# Patient Record
Sex: MALE | Race: ASIAN | Employment: OTHER | URBAN - METROPOLITAN AREA
[De-identification: names, ages, dates, MRNs, and addresses within clinical notes are randomized per-mention and may not be internally consistent; named-entity substitution may affect disease eponyms.]

---

## 2017-01-04 ENCOUNTER — APPOINTMENT (OUTPATIENT)
Dept: CARDIAC REHAB | Age: 67
End: 2017-01-04

## 2017-01-05 ENCOUNTER — OFFICE VISIT (OUTPATIENT)
Dept: FAMILY MEDICINE CLINIC | Age: 67
End: 2017-01-05

## 2017-01-05 VITALS
DIASTOLIC BLOOD PRESSURE: 84 MMHG | BODY MASS INDEX: 23.3 KG/M2 | HEIGHT: 66 IN | WEIGHT: 145 LBS | HEART RATE: 81 BPM | SYSTOLIC BLOOD PRESSURE: 142 MMHG | TEMPERATURE: 97.7 F | RESPIRATION RATE: 14 BRPM

## 2017-01-05 DIAGNOSIS — Z01.818 PREOP GENERAL PHYSICAL EXAM: Primary | ICD-10-CM

## 2017-01-05 DIAGNOSIS — E78.5 DYSLIPIDEMIA, GOAL LDL BELOW 100: ICD-10-CM

## 2017-01-05 DIAGNOSIS — I21.4 NSTEMI (NON-ST ELEVATED MYOCARDIAL INFARCTION) (HCC): ICD-10-CM

## 2017-01-05 DIAGNOSIS — E11.65 TYPE 2 DIABETES MELLITUS WITH HYPERGLYCEMIA, WITHOUT LONG-TERM CURRENT USE OF INSULIN (HCC): ICD-10-CM

## 2017-01-05 DIAGNOSIS — I10 ESSENTIAL HYPERTENSION WITH GOAL BLOOD PRESSURE LESS THAN 130/85: ICD-10-CM

## 2017-01-05 RX ORDER — METOPROLOL SUCCINATE 50 MG/1
50 TABLET, EXTENDED RELEASE ORAL DAILY
Qty: 30 TAB | Refills: 5 | Status: SHIPPED | OUTPATIENT
Start: 2017-01-05 | End: 2017-12-14

## 2017-01-05 RX ORDER — PRAVASTATIN SODIUM 40 MG/1
40 TABLET ORAL
Qty: 30 TAB | Refills: 4 | Status: SHIPPED | OUTPATIENT
Start: 2017-01-05 | End: 2018-06-25

## 2017-01-05 RX ORDER — HYDROCODONE POLISTIREX AND CHLORPHENIRAMINE POLISTIREX 10; 8 MG/5ML; MG/5ML
1 SUSPENSION, EXTENDED RELEASE ORAL
Qty: 120 ML | Refills: 0 | Status: SHIPPED | OUTPATIENT
Start: 2017-01-05 | End: 2017-05-30 | Stop reason: ALTCHOICE

## 2017-01-05 RX ORDER — LOSARTAN POTASSIUM AND HYDROCHLOROTHIAZIDE 12.5; 5 MG/1; MG/1
1 TABLET ORAL DAILY
Qty: 30 TAB | Refills: 5 | Status: SHIPPED | OUTPATIENT
Start: 2017-01-05 | End: 2018-03-21 | Stop reason: ALTCHOICE

## 2017-01-05 NOTE — PROGRESS NOTES
Chief Complaint   Patient presents with    Pre-op Exam     cataract surgery      1. Have you been to the ER, urgent care clinic since your last visit? Hospitalized since your last visit? Yes    2. Have you seen or consulted any other health care providers outside of the [address] [place] [address] since your last visit? Include any pap smears or colon screening.    No

## 2017-01-05 NOTE — PROGRESS NOTES
HISTORY OF PRESENT ILLNESS  Dorothy Dey is a 73 y.o. male. He was seen for preop clearance for eye surgery, follow up on CAD, HTN , dyslipidemia and DM. He follows Dr Jazmyne Villatoro . He had 2 vessel PCI on 11/11/2016 and is on dual antiplatelet. HPI  HPI:  Charlene Etienne is 73 y.o. male (1950) who presents for preoperative evaluation. Procedure/Surgery: removal of cataract and implantation of intraocular lens   Date of Procedure/Surgery: 1/17/17  Surgeon: Dr Arabella Rao  Primary Physician: Javier Baker MD       Reason for surgery: worsening vision    Latex Allergy: no    Anesthesia Complications: None  History of abnormal bleeding : None  History of Blood Transfusions: no  Health Care Directive or Living Will: no  Recent use of: ASA and Effient  Tetanus up to date: tetanus status unknown to the patient      EKG: EKG FINDINGS - normal EKG, normal sinus rhythm  CXR: was negative for infiltrate, effusion, pneumothorax, or wide mediastinum          ---------------------------------------     Prior to Admission medications    Medication Sig Start Date End Date Taking? Authorizing Provider   losartan-hydroCHLOROthiazide (HYZAAR) 50-12.5 mg per tablet Take 1 Tab by mouth daily. 1/5/17  Yes Javier Baker MD   metoprolol succinate (TOPROL-XL) 50 mg XL tablet Take 1 Tab by mouth daily. 1/5/17  Yes Javier Baker MD   pravastatin (PRAVACHOL) 40 mg tablet Take 1 Tab by mouth nightly. 1/5/17  Yes Javier Baker MD   chlorpheniramine-HYDROcodone (TUSSIONEX) 10-8 mg/5 mL suspension Take 5 mL by mouth every twelve (12) hours as needed for Cough. Max Daily Amount: 10 mL. 1/5/17  Yes Javier Baker MD   glucose blood VI test strips (ONETOUCH VERIO) strip Check sugar once daily 12/6/16  Yes Javier Baker MD   lancets (ONE TOUCH DELICA) 33 gauge misc Check sugar once daily 12/6/16  Yes Javier Baker MD   prasugrel (EFFIENT) 10 mg tablet Take 1 Tab by mouth daily.  11/12/16  Yes Amanda Keen MD   glipiZIDE (GLUCOTROL) 10 mg tablet Take 1 Tab by mouth two (2) times a day. 9/22/16  Yes Francisco Upton MD   metFORMIN (GLUCOPHAGE) 1,000 mg tablet Take 1 Tab by mouth two (2) times daily (with meals). 9/22/16  Yes Francisco Upton MD   aspirin 81 mg tablet Take 81 mg by mouth daily. Yes Historical Provider   therapeutic multivitamin (THERAGRAN) tablet Take 1 Tab by mouth daily. Historical Provider          No Known Allergies     Patient Active Problem List    Diagnosis Date Noted    Preop general physical exam 01/05/2017    NSTEMI (non-ST elevated myocardial infarction) ([facility] [address] 11/10/2016    Nonrheumatic aortic valve stenosis 09/21/2016    Bruit of right carotid artery 09/21/2016    Essential hypertension with goal blood pressure less than 130/85 05/26/2015    Type 2 diabetes mellitus with hyperglycemia ([facility] [address] 05/26/2015    Deafness 10/28/2012    Dyslipidemia, goal LDL below 100 03/26/2012    Cramp of limb 03/26/2012        Past Medical History   Diagnosis Date    CAD (coronary artery disease) 11/10/2016     NSTEMI & 2 stents    Deafness 10/28/2012    DM (diabetes mellitus) ([facility] [address]     Elevated cholesterol     Hypertension        Past Surgical History   Procedure Laterality Date    Hx appendectomy      Pr cardiac surg procedure unlist  11/11/2016     2 stents       Social History   Substance Use Topics    Smoking status: Never Smoker    Smokeless tobacco: Never Used    Alcohol use 1.2 oz/week     1 Cans of beer, 1 Shots of liquor per week         --------------------------------------    Review of Systems   Constitutional: Negative for chills, fever and malaise/fatigue. HENT: Negative for congestion, ear pain, sore throat and tinnitus. Eyes: Negative for blurred vision, double vision, pain and discharge. Respiratory: Positive for cough. Negative for shortness of breath and wheezing. Cardiovascular: Negative for chest pain, palpitations and leg swelling.    Gastrointestinal: Negative for abdominal pain, blood in stool, constipation, diarrhea, nausea and vomiting. Genitourinary: Negative for dysuria, frequency, hematuria and urgency. Musculoskeletal: Negative for back pain, joint pain and myalgias. Skin: Negative for rash. Neurological: Negative for dizziness, tremors, seizures and headaches. Endo/Heme/Allergies: Negative for polydipsia. Does not bruise/bleed easily. Psychiatric/Behavioral: Negative for depression and substance abuse. The patient is not nervous/anxious. Physical Exam   Constitutional: He is oriented to person, place, and time. He appears well-developed and well-nourished. HENT:   Head: Normocephalic and atraumatic. Right Ear: External ear normal.   Mouth/Throat: Oropharynx is clear and moist. No oropharyngeal exudate. Eyes: Conjunctivae and EOM are normal. Pupils are equal, round, and reactive to light. No scleral icterus. Neck: Normal range of motion. Neck supple. No JVD present. No thyromegaly present. Cardiovascular: Normal rate, regular rhythm, normal heart sounds and intact distal pulses. No murmur heard. Pulmonary/Chest: Effort normal and breath sounds normal. He has no wheezes. Abdominal: Soft. Bowel sounds are normal. He exhibits no distension and no mass. Musculoskeletal: Normal range of motion. He exhibits no edema or tenderness. Lymphadenopathy:     He has no cervical adenopathy. Neurological: He is alert and oriented to person, place, and time. He has normal reflexes. No cranial nerve deficit. Skin: Skin is warm and dry. No rash noted. He is not diaphoretic. Psychiatric: He has a normal mood and affect. Nursing note and vitals reviewed. ASSESSMENT and PLAN  Elena Casas was seen today for pre-op exam.    Diagnoses and all orders for this visit:    Preop general physical exam    Dyslipidemia, goal LDL below 075  -     METABOLIC PANEL, COMPREHENSIVE  -     LIPID PANEL  -     pravastatin (PRAVACHOL) 40 mg tablet;  Take 1 Tab by mouth nightly. Type 2 diabetes mellitus with hyperglycemia, without long-term current use of insulin ([facility])  -     METABOLIC PANEL, COMPREHENSIVE  -     HEMOGLOBIN A1C WITH EAG    NSTEMI (non-ST elevated myocardial infarction) ([facility])  -     METABOLIC PANEL, COMPREHENSIVE  -     LIPID PANEL    Essential hypertension with goal blood pressure less than 130/85  -     losartan-hydroCHLOROthiazide (HYZAAR) 50-12.5 mg per tablet; Take 1 Tab by mouth daily. -     metoprolol succinate (TOPROL-XL) 50 mg XL tablet; Take 1 Tab by mouth daily. Other orders  -     chlorpheniramine-HYDROcodone (TUSSIONEX) 10-8 mg/5 mL suspension; Take 5 mL by mouth every twelve (12) hours as needed for Cough. Max Daily Amount: 10 mL. Metoprolol changed to Metoprolol XL 50 mg as per cardiology advise. Losartan changed to Losartan/hctz due to suboptimal control of BP  Pravastatin dose was increased to 40 mg  After reviewing the patient's history & exam he is high risk for cardiac complications. Will get blood work and cardiologist opinion, before clearing him for surgery    Discussed lifestyle issues and health guidance given  Patient was given an after visit summary which includes diagnoses, vital signs, current medications, instructions and references & authorized prescriptions . Results of labs will be conveyed to patient, once available. Pt verbalized instructions I provided and expressed understanding of discussion that was held today. Follow-up Disposition:  Return in about 3 months (around 4/5/2017) for fasting, medicare wellness.

## 2017-01-05 NOTE — PATIENT INSTRUCTIONS
DASH Diet: Care Instructions  Your Care Instructions  The DASH diet is an eating plan that can help lower your blood pressure. DASH stands for Dietary Approaches to Stop Hypertension. Hypertension is high blood pressure. The DASH diet focuses on eating foods that are high in calcium, potassium, and magnesium. These nutrients can lower blood pressure. The foods that are highest in these nutrients are fruits, vegetables, low-fat dairy products, nuts, seeds, and legumes. But taking calcium, potassium, and magnesium supplements instead of eating foods that are high in those nutrients does not have the same effect. The DASH diet also includes whole grains, fish, and poultry. The DASH diet is one of several lifestyle changes your doctor may recommend to lower your high blood pressure. Your doctor may also want you to decrease the amount of sodium in your diet. Lowering sodium while following the DASH diet can lower blood pressure even further than just the DASH diet alone. Follow-up care is a key part of your treatment and safety. Be sure to make and go to all appointments, and call your doctor if you are having problems. It's also a good idea to know your test results and keep a list of the medicines you take. How can you care for yourself at home? Following the DASH diet  · Eat 4 to 5 servings of fruit each day. A serving is 1 medium-sized piece of fruit, ½ cup chopped or canned fruit, 1/4 cup dried fruit, or 4 ounces (½ cup) of fruit juice. Choose fruit more often than fruit juice. · Eat 4 to 5 servings of vegetables each day. A serving is 1 cup of lettuce or raw leafy vegetables, ½ cup of chopped or cooked vegetables, or 4 ounces (½ cup) of vegetable juice. Choose vegetables more often than vegetable juice. · Get 2 to 3 servings of low-fat and fat-free dairy each day. A serving is 8 ounces of milk, 1 cup of yogurt, or 1 ½ ounces of cheese. · Eat 6 to 8 servings of grains each day.  A serving is 1 slice of bread, 1 ounce of dry cereal, or ½ cup of cooked rice, pasta, or cooked cereal. Try to choose whole-grain products as much as possible. · Limit lean meat, poultry, and fish to 2 servings each day. A serving is 3 ounces, about the size of a deck of cards. · Eat 4 to 5 servings of nuts, seeds, and legumes (cooked dried beans, lentils, and split peas) each week. A serving is 1/3 cup of nuts, 2 tablespoons of seeds, or ½ cup of cooked beans or peas. · Limit fats and oils to 2 to 3 servings each day. A serving is 1 teaspoon of vegetable oil or 2 tablespoons of salad dressing. · Limit sweets and added sugars to 5 servings or less a week. A serving is 1 tablespoon jelly or jam, ½ cup sorbet, or 1 cup of lemonade. · Eat less than 2,300 milligrams (mg) of sodium a day. If you limit your sodium to 1,500 mg a day, you can lower your blood pressure even more. Tips for success  · Start small. Do not try to make dramatic changes to your diet all at once. You might feel that you are missing out on your favorite foods and then be more likely to not follow the plan. Make small changes, and stick with them. Once those changes become habit, add a few more changes. · Try some of the following:  ¨ Make it a goal to eat a fruit or vegetable at every meal and at snacks. This will make it easy to get the recommended amount of fruits and vegetables each day. ¨ Try yogurt topped with fruit and nuts for a snack or healthy dessert. ¨ Add lettuce, tomato, cucumber, and onion to sandwiches. ¨ Combine a ready-made pizza crust with low-fat mozzarella cheese and lots of vegetable toppings. Try using tomatoes, squash, spinach, broccoli, carrots, cauliflower, and onions. ¨ Have a variety of cut-up vegetables with a low-fat dip as an appetizer instead of chips and dip. ¨ Sprinkle sunflower seeds or chopped almonds over salads. Or try adding chopped walnuts or almonds to cooked vegetables. ¨ Try some vegetarian meals using beans and peas. Add garbanzo or kidney beans to salads. Make burritos and tacos with mashed celestin beans or black beans. Where can you learn more? Go to http://pardeep-harjit.info/. Enter L349 in the search box to learn more about \"DASH Diet: Care Instructions. \"  Current as of: March 23, 2016  Content Version: 11.1  © 1958-6109 [employer]. Care instructions adapted under license by [employer] (which disclaims liability or warranty for this information). If you have questions about a medical condition or this instruction, always ask your healthcare professional. [address] any warranty or liability for your use of this information.

## 2017-01-05 NOTE — MR AVS SNAPSHOT
Visit Information Date & Time Provider Department Dept. Phone Encounter #  
 1/5/2017  3:00 PM Berenice Becker MD Alec Egan [id number] Follow-up Instructions Return in about 3 months (around 4/5/2017) for fasting, medicare wellness. Upcoming Health Maintenance Date Due Hepatitis C Screening 1950 DTaP/Tdap/Td series (1 - Tdap) 10/5/1972 FOBT Q 1 YEAR AGE 50-75 10/5/2001 ZOSTER VACCINE AGE 60> 10/5/2011 Pneumococcal 65+ Low/Medium Risk (1 of 2 - PCV13) 10/5/2016 MEDICARE YEARLY EXAM 10/5/2016 HEMOGLOBIN A1C Q6M 3/21/2017 MICROALBUMIN Q1 4/19/2017 FOOT EXAM Q1 9/21/2017 LIPID PANEL Q1 9/21/2017 EYE EXAM RETINAL OR DILATED Q1 12/6/2017 GLAUCOMA SCREENING Q2Y 12/6/2018 Allergies as of 1/5/2017  Review Complete On: 1/5/2017 By: Berenice Becker MD  
 No Known Allergies Current Immunizations  Reviewed on 11/10/2016 Name Date Influenza Vaccine 10/20/2015 10:00 AM, 1/27/2015  9:30 AM, 10/22/2013 Influenza Vaccine (Quad) PF 11/12/2016 10:10 AM  
  
 Not reviewed this visit You Were Diagnosed With   
  
 Codes Comments Preop general physical exam    -  Primary ICD-10-CM: O09.641 ICD-9-CM: V72.83 Dyslipidemia, goal LDL below 100     ICD-10-CM: E78.5 ICD-9-CM: 272.4 Type 2 diabetes mellitus with hyperglycemia, without long-term current use of insulin (HCC)     ICD-10-CM: E11.65 ICD-9-CM: 250.00, 790.29 NSTEMI (non-ST elevated myocardial infarction) ([facility]     ICD-10-CM: I21.4 ICD-9-CM: 410.70 Essential hypertension with goal blood pressure less than 130/85     ICD-10-CM: I10 
ICD-9-CM: 401.9 Vitals BP Pulse Temp Resp Height(growth percentile) Weight(growth percentile) 142/84 (BP 1 Location: Right arm, BP Patient Position: Sitting) 81 97.7 °F (36.5 °C) (Oral) 14 5' 6\" (1.676 m) 145 lb (65.8 kg) BMI Smoking Status 23.4 kg/m2 Never Smoker Vitals History BMI and BSA Data Body Mass Index Body Surface Area  
 23.4 kg/m 2 1.75 m 2 Preferred Pharmacy Pharmacy Name Phone [facility] [phone] Your Updated Medication List  
  
   
This list is accurate as of: 1/5/17  4:04 PM.  Always use your most recent med list.  
  
  
  
  
 aspirin 81 mg tablet Take 81 mg by mouth daily. glipiZIDE 10 mg tablet Commonly known as:  Ashley Shoe Take 1 Tab by mouth two (2) times a day. glucose blood VI test strips strip Commonly known as:  Amie Morales Check sugar once daily  
  
 lancets 33 gauge Misc Commonly known as: One Touch Shipman Dings Check sugar once daily  
  
 losartan-hydroCHLOROthiazide 50-12.5 mg per tablet Commonly known as:  HYZAAR Take 1 Tab by mouth daily. metFORMIN 1,000 mg tablet Commonly known as:  GLUCOPHAGE Take 1 Tab by mouth two (2) times daily (with meals). metoprolol succinate 50 mg XL tablet Commonly known as:  TOPROL-XL Take 1 Tab by mouth daily. prasugrel 10 mg tablet Commonly known as:  EFFIENT Take 1 Tab by mouth daily. pravastatin 40 mg tablet Commonly known as:  PRAVACHOL Take 1 Tab by mouth nightly. therapeutic multivitamin tablet Commonly known as:  [facility] Take 1 Tab by mouth daily. Prescriptions Sent to Pharmacy Refills  
 losartan-hydroCHLOROthiazide (HYZAAR) 50-12.5 mg per tablet 5 Sig: Take 1 Tab by mouth daily. Class: Normal  
 Pharmacy: [address] Ph #: [phone] Route: Oral  
 metoprolol succinate (TOPROL-XL) 50 mg XL tablet 5 Sig: Take 1 Tab by mouth daily. Class: Normal  
 Pharmacy: [address] Ph #: [phone] Route: Oral  
 pravastatin (PRAVACHOL) 40 mg tablet 4 Sig: Take 1 Tab by mouth nightly. Class: Normal  
 [facility] #: [phone] Route: Oral  
  
We Performed the Following HEMOGLOBIN A1C WITH EAG [97923 CPT(R)] LIPID PANEL [53614 CPT(R)] METABOLIC PANEL, COMPREHENSIVE [08703 CPT(R)] Follow-up Instructions Return in about 3 months (around 4/5/2017) for fasting, medicare wellness.   
  
To-Do List   
 01/09/2017 4:00 PM  
  Appointment with [address] at [address] [place] [address] ([phone])  
  
 01/11/2017 3:00 PM  
  Appointment with [address] at [address] [place] [address] ([phone])  
  
 01/12/2017 4:00 PM  
  Appointment with [address] at [address] [place] [address] ([phone])  
  
 01/16/2017 4:00 PM  
  Appointment with [address] at [address] [place] [address] ([phone])  
  
 01/18/2017 3:00 PM  
  Appointment with [address] at [address] [place] [address] ([phone])  
  
 01/19/2017 4:00 PM  
  Appointment with [address] at [address] [place] [address] ([phone])  
  
 01/23/2017 4:00 PM  
  Appointment with [address] at [address] [place] [address] ([phone])  
  
 01/25/2017 3:00 PM  
  Appointment with [address] at [address] [place] [address] ([phone])  
  
 01/26/2017 4:00 PM  
  Appointment with [address] at [address] [place] [address] ([phone])  
  
 01/30/2017 4:00 PM  
  Appointment with [address] at [address] [place] [address] ([phone])  
  
 02/01/2017 3:00 PM  
  Appointment with [address] at [address] [place] [address] ([phone])  
  
 02/02/2017 4:00 PM  
  Appointment with [address] at [address] [place] [address] ([phone])  
  
 02/06/2017 4:00 PM  
  Appointment with [address] at [address] [place] [address] ([phone])  
  
 02/08/2017 3:00 PM  
 Appointment with [address] at [address] [place] [address] ([phone])  
  
 02/09/2017 4:00 PM  
  Appointment with [address] at [address] [place] [address] ([phone])  
  
 02/13/2017 4:00 PM  
  Appointment with [address] at [address] [place] [address] ([phone])  
  
 02/15/2017 3:00 PM  
  Appointment with [address] at [address] [place] [address] ([phone])  
  
 02/16/2017 4:00 PM  
  Appointment with [address] at [address] [place] [address] ([phone])  
  
 02/20/2017 4:00 PM  
  Appointment with [address] at [address] [place] [address] ([phone])  
  
 02/22/2017 3:00 PM  
  Appointment with [address] at [address] [place] [address] ([phone])  
  
 02/23/2017 4:00 PM  
  Appointment with [address] at [address] [place] [address] ([phone])  
  
 02/27/2017 4:00 PM  
  Appointment with [address] at [address] [place] [address] ([phone]) Patient Instructions DASH Diet: Care Instructions Your Care Instructions The DASH diet is an eating plan that can help lower your blood pressure. DASH stands for Dietary Approaches to Stop Hypertension. Hypertension is high blood pressure. The DASH diet focuses on eating foods that are high in calcium, potassium, and magnesium. These nutrients can lower blood pressure. The foods that are highest in these nutrients are fruits, vegetables, low-fat dairy products, nuts, seeds, and legumes. But taking calcium, potassium, and magnesium supplements instead of eating foods that are high in those nutrients does not have the same effect. The DASH diet also includes whole grains, fish, and poultry. The DASH diet is one of several lifestyle changes your doctor may recommend to lower your high blood pressure. Your doctor may also want you to decrease the amount of sodium in your diet.  Lowering sodium while following the DASH diet can lower blood pressure even further than just the DASH diet alone. Follow-up care is a key part of your treatment and safety. Be sure to make and go to all appointments, and call your doctor if you are having problems. It's also a good idea to know your test results and keep a list of the medicines you take. How can you care for yourself at home? Following the DASH diet · Eat 4 to 5 servings of fruit each day. A serving is 1 medium-sized piece of fruit, ½ cup chopped or canned fruit, 1/4 cup dried fruit, or 4 ounces (½ cup) of fruit juice. Choose fruit more often than fruit juice. · Eat 4 to 5 servings of vegetables each day. A serving is 1 cup of lettuce or raw leafy vegetables, ½ cup of chopped or cooked vegetables, or 4 ounces (½ cup) of vegetable juice. Choose vegetables more often than vegetable juice. · Get 2 to 3 servings of low-fat and fat-free dairy each day. A serving is 8 ounces of milk, 1 cup of yogurt, or 1 ½ ounces of cheese. · Eat 6 to 8 servings of grains each day. A serving is 1 slice of bread, 1 ounce of dry cereal, or ½ cup of cooked rice, pasta, or cooked cereal. Try to choose whole-grain products as much as possible. · Limit lean meat, poultry, and fish to 2 servings each day. A serving is 3 ounces, about the size of a deck of cards. · Eat 4 to 5 servings of nuts, seeds, and legumes (cooked dried beans, lentils, and split peas) each week. A serving is 1/3 cup of nuts, 2 tablespoons of seeds, or ½ cup of cooked beans or peas. · Limit fats and oils to 2 to 3 servings each day. A serving is 1 teaspoon of vegetable oil or 2 tablespoons of salad dressing. · Limit sweets and added sugars to 5 servings or less a week. A serving is 1 tablespoon jelly or jam, ½ cup sorbet, or 1 cup of lemonade. · Eat less than 2,300 milligrams (mg) of sodium a day. If you limit your sodium to 1,500 mg a day, you can lower your blood pressure even more. Tips for success · Start small. Do not try to make dramatic changes to your diet all at once. You might feel that you are missing out on your favorite foods and then be more likely to not follow the plan. Make small changes, and stick with them. Once those changes become habit, add a few more changes. · Try some of the following: ¨ Make it a goal to eat a fruit or vegetable at every meal and at snacks. This will make it easy to get the recommended amount of fruits and vegetables each day. ¨ Try yogurt topped with fruit and nuts for a snack or healthy dessert. ¨ Add lettuce, tomato, cucumber, and onion to sandwiches. ¨ Combine a ready-made pizza crust with low-fat mozzarella cheese and lots of vegetable toppings. Try using tomatoes, squash, spinach, broccoli, carrots, cauliflower, and onions. ¨ Have a variety of cut-up vegetables with a low-fat dip as an appetizer instead of chips and dip. ¨ Sprinkle sunflower seeds or chopped almonds over salads. Or try adding chopped walnuts or almonds to cooked vegetables. ¨ Try some vegetarian meals using beans and peas. Add garbanzo or kidney beans to salads. Make burritos and tacos with mashed celestin beans or black beans. Where can you learn more? Go to http://pardeep-harjit.info/. Enter D980 in the search box to learn more about \"DASH Diet: Care Instructions. \" Current as of: March 23, 2016 Content Version: 11.1 © 3007-2139 [employer]. Care instructions adapted under license by [employer] (which disclaims liability or warranty for this information). If you have questions about a medical condition or this instruction, always ask your healthcare professional. [place] [address] any warranty or liability for your use of this information. Introducing [facility]! Dear Enoc Stanley: Thank you for requesting a eTipping account. Our records indicate that you already have an active eTipping account.   You can access your account anytime at [email or web address] On The Go. [employer]/Live On The Go Did you know that you can access your hospital and ER discharge instructions at any time in [employer]? You can also review all of your test results from your hospital stay or ER visit. Additional Information If you have questions, please visit the Frequently Asked Questions section of the [employer] website at [email or web address] Remember, [employer] is NOT to be used for urgent needs. For medical emergencies, dial 911. Now available from your iPhone and Android! Please provide this summary of care documentation to your next provider. Your primary care clinician is listed as Aung Murdock. If you have any questions after today's visit, please call [phone].

## 2017-01-07 LAB
ALBUMIN SERPL-MCNC: 3.9 G/DL (ref 3.6–4.8)
ALBUMIN/GLOB SERPL: 1.3 {RATIO} (ref 1.1–2.5)
ALP SERPL-CCNC: 87 IU/L (ref 39–117)
ALT SERPL-CCNC: 23 IU/L (ref 0–44)
AST SERPL-CCNC: 28 IU/L (ref 0–40)
BILIRUB SERPL-MCNC: 0.2 MG/DL (ref 0–1.2)
BUN SERPL-MCNC: 12 MG/DL (ref 8–27)
BUN/CREAT SERPL: 14 (ref 10–22)
CALCIUM SERPL-MCNC: 9.1 MG/DL (ref 8.6–10.2)
CHLORIDE SERPL-SCNC: 105 MMOL/L (ref 96–106)
CHOLEST SERPL-MCNC: 177 MG/DL (ref 100–199)
CO2 SERPL-SCNC: 23 MMOL/L (ref 18–29)
CREAT SERPL-MCNC: 0.84 MG/DL (ref 0.76–1.27)
EST. AVERAGE GLUCOSE BLD GHB EST-MCNC: 177 MG/DL
GLOBULIN SER CALC-MCNC: 3.1 G/DL (ref 1.5–4.5)
GLUCOSE SERPL-MCNC: 130 MG/DL (ref 65–99)
HBA1C MFR BLD: 7.8 % (ref 4.8–5.6)
HDLC SERPL-MCNC: 47 MG/DL
INTERPRETATION, 910389: NORMAL
LDLC SERPL CALC-MCNC: 112 MG/DL (ref 0–99)
POTASSIUM SERPL-SCNC: 5 MMOL/L (ref 3.5–5.2)
PROT SERPL-MCNC: 7 G/DL (ref 6–8.5)
SODIUM SERPL-SCNC: 142 MMOL/L (ref 134–144)
TRIGL SERPL-MCNC: 89 MG/DL (ref 0–149)
VLDLC SERPL CALC-MCNC: 18 MG/DL (ref 5–40)

## 2017-01-08 DIAGNOSIS — E11.65 TYPE 2 DIABETES MELLITUS WITH HYPERGLYCEMIA, WITHOUT LONG-TERM CURRENT USE OF INSULIN (HCC): Primary | ICD-10-CM

## 2017-01-09 DIAGNOSIS — E11.65 TYPE 2 DIABETES MELLITUS WITH HYPERGLYCEMIA, WITHOUT LONG-TERM CURRENT USE OF INSULIN (HCC): ICD-10-CM

## 2017-01-09 RX ORDER — METFORMIN HYDROCHLORIDE 1000 MG/1
1000 TABLET ORAL 2 TIMES DAILY WITH MEALS
Qty: 180 TAB | Refills: 2 | Status: SHIPPED | OUTPATIENT
Start: 2017-01-09 | End: 2017-04-21 | Stop reason: SDUPTHER

## 2017-01-09 RX ORDER — GLIPIZIDE 10 MG/1
10 TABLET ORAL 2 TIMES DAILY
Qty: 180 TAB | Refills: 2 | Status: SHIPPED | OUTPATIENT
Start: 2017-01-09 | End: 2017-04-21 | Stop reason: SDUPTHER

## 2017-01-11 ENCOUNTER — APPOINTMENT (OUTPATIENT)
Dept: CARDIAC REHAB | Age: 67
End: 2017-01-11

## 2017-01-16 ENCOUNTER — TELEPHONE (OUTPATIENT)
Dept: FAMILY MEDICINE CLINIC | Age: 67
End: 2017-01-16

## 2017-01-17 NOTE — TELEPHONE ENCOUNTER
Mrs. Kavya Becker was informed that the cardiologist should be notified of medication refill, being that they are managing PT/INR. Mrs. Kendall verbalized understanding.

## 2017-01-18 ENCOUNTER — APPOINTMENT (OUTPATIENT)
Dept: CARDIAC REHAB | Age: 67
End: 2017-01-18

## 2017-01-25 ENCOUNTER — APPOINTMENT (OUTPATIENT)
Dept: CARDIAC REHAB | Age: 67
End: 2017-01-25

## 2017-01-27 ENCOUNTER — TELEPHONE (OUTPATIENT)
Dept: CARDIAC REHAB | Age: 67
End: 2017-01-27

## 2017-01-27 NOTE — TELEPHONE ENCOUNTER
Cardiac Wellness: Call placed to [address] [place] [address] to check on his absence for the CWP. Spoke with his wife and he is having cataract surgery on 1/31/2017 and we agreed to a recall on 2/3 to check on his return then. Darshana Crowley RN

## 2017-02-01 ENCOUNTER — APPOINTMENT (OUTPATIENT)
Dept: CARDIAC REHAB | Age: 67
End: 2017-02-01
Payer: COMMERCIAL

## 2017-02-01 ENCOUNTER — HOSPITAL ENCOUNTER (OUTPATIENT)
Dept: CARDIAC REHAB | Age: 67
End: 2017-02-01
Payer: COMMERCIAL

## 2017-02-01 PROCEDURE — 93798 PHYS/QHP OP CAR RHAB W/ECG: CPT

## 2017-02-02 ENCOUNTER — TELEPHONE (OUTPATIENT)
Dept: CARDIAC REHAB | Age: 67
End: 2017-02-02

## 2017-02-02 NOTE — TELEPHONE ENCOUNTER
Cardiac Wellness:Call placed to [facility] and spoke with his wife. His cataract surgery was cancelled again due to elevated blood pressure. His meds are currently being adjusted. Agreed to call mid Feb to check his status. Malka Lanza RN         []Hide copied text  []Hover for attribution information  Cardiac Wellness: Call placed to [facility] to check on his absence for the CWP. Spoke with his wife and he is having cataract surgery on 1/31/2017 and we agreed to a recall on 2/3 to check on his return then. Malka Lanza RN            Electronically signed by Malka Lanza RN at 01/27/17 1526        Telephone on 1/27/2017              Detailed Report         Note Details   Author Malka Lanza RN File Time 01/27/17 1520   Author Type Registered Nurse Status Signed   Last  Malka Lanza RN Service (none)

## 2017-02-08 ENCOUNTER — APPOINTMENT (OUTPATIENT)
Dept: CARDIAC REHAB | Age: 67
End: 2017-02-08
Payer: COMMERCIAL

## 2017-02-15 ENCOUNTER — APPOINTMENT (OUTPATIENT)
Dept: CARDIAC REHAB | Age: 67
End: 2017-02-15
Payer: COMMERCIAL

## 2017-02-20 ENCOUNTER — APPOINTMENT (OUTPATIENT)
Dept: CARDIAC REHAB | Age: 67
End: 2017-02-20
Payer: COMMERCIAL

## 2017-02-20 ENCOUNTER — TELEPHONE (OUTPATIENT)
Dept: CARDIAC REHAB | Age: 67
End: 2017-02-20

## 2017-02-22 ENCOUNTER — APPOINTMENT (OUTPATIENT)
Dept: CARDIAC REHAB | Age: 67
End: 2017-02-22
Payer: COMMERCIAL

## 2017-02-23 ENCOUNTER — APPOINTMENT (OUTPATIENT)
Dept: CARDIAC REHAB | Age: 67
End: 2017-02-23
Payer: COMMERCIAL

## 2017-02-27 ENCOUNTER — APPOINTMENT (OUTPATIENT)
Dept: CARDIAC REHAB | Age: 67
End: 2017-02-27
Payer: COMMERCIAL

## 2017-03-17 ENCOUNTER — TELEPHONE (OUTPATIENT)
Dept: CARDIAC REHAB | Age: 67
End: 2017-03-17

## 2017-03-17 NOTE — TELEPHONE ENCOUNTER
3/17/2017: Cardiac Wellness: Shawanda Kendall regarding absence from the Cardiac Wellness Program. Pt's wife stated he will have to have cataract surgery on his other eye in the next few weeks. Agreed CWP will follow up with Sarbjit Timmons at the end of April.  Magy Francis RN

## 2017-04-20 DIAGNOSIS — E11.65 TYPE 2 DIABETES MELLITUS WITH HYPERGLYCEMIA, WITHOUT LONG-TERM CURRENT USE OF INSULIN (HCC): ICD-10-CM

## 2017-04-20 NOTE — TELEPHONE ENCOUNTER
Susanna Kendall  [phone]    Patient's wife, Cathleen Mario, has questions about her 's medications. She is on [facility] in [facility].

## 2017-04-21 RX ORDER — GLIPIZIDE 10 MG/1
10 TABLET ORAL 2 TIMES DAILY
Qty: 180 TAB | Refills: 2 | Status: SHIPPED | OUTPATIENT
Start: 2017-04-21 | End: 2018-05-11 | Stop reason: SDUPTHER

## 2017-04-21 RX ORDER — METFORMIN HYDROCHLORIDE 1000 MG/1
1000 TABLET ORAL 2 TIMES DAILY WITH MEALS
Qty: 180 TAB | Refills: 2 | Status: SHIPPED | OUTPATIENT
Start: 2017-04-21 | End: 2018-05-21 | Stop reason: SDUPTHER

## 2017-04-21 NOTE — TELEPHONE ENCOUNTER
Attempted  to call pt's wife back but to no avail, left her a voicemail message to call office back.

## 2017-05-05 ENCOUNTER — TELEPHONE (OUTPATIENT)
Dept: FAMILY MEDICINE CLINIC | Age: 67
End: 2017-05-05

## 2017-05-05 NOTE — TELEPHONE ENCOUNTER
Referral Request Telephone Call      Insurance Name:     Brandy Laurent [address] ID:  [id number]   Specialist Name: Dr. Mike Altamirano   Type of Specialty:  Cardiothoracic Surgical    Address of Specialist:  [address] [place] [address]   Phone/Fax Number of Specialist: [phone]   Fax    [phone]   Diagnosis: 121.4   Appointment Date: 5-5-2017 @ 9:00   NPI    Tax ID

## 2017-05-30 ENCOUNTER — OFFICE VISIT (OUTPATIENT)
Dept: FAMILY MEDICINE CLINIC | Age: 67
End: 2017-05-30

## 2017-05-30 VITALS
TEMPERATURE: 97.9 F | DIASTOLIC BLOOD PRESSURE: 70 MMHG | HEART RATE: 72 BPM | HEIGHT: 66 IN | BODY MASS INDEX: 22.82 KG/M2 | OXYGEN SATURATION: 98 % | SYSTOLIC BLOOD PRESSURE: 136 MMHG | RESPIRATION RATE: 16 BRPM | WEIGHT: 142 LBS

## 2017-05-30 DIAGNOSIS — I10 ESSENTIAL HYPERTENSION WITH GOAL BLOOD PRESSURE LESS THAN 130/85: ICD-10-CM

## 2017-05-30 DIAGNOSIS — I35.0 NONRHEUMATIC AORTIC VALVE STENOSIS: ICD-10-CM

## 2017-05-30 DIAGNOSIS — Z12.11 COLON CANCER SCREENING: ICD-10-CM

## 2017-05-30 DIAGNOSIS — E78.5 DYSLIPIDEMIA, GOAL LDL BELOW 100: ICD-10-CM

## 2017-05-30 DIAGNOSIS — Z11.59 SCREENING FOR VIRAL DISEASE: ICD-10-CM

## 2017-05-30 DIAGNOSIS — E11.65 TYPE 2 DIABETES MELLITUS WITH HYPERGLYCEMIA, WITHOUT LONG-TERM CURRENT USE OF INSULIN (HCC): Primary | ICD-10-CM

## 2017-05-30 RX ORDER — PRAVASTATIN SODIUM 80 MG/1
TABLET ORAL
COMMUNITY
Start: 2017-02-25 | End: 2017-05-30 | Stop reason: SINTOL

## 2017-05-30 RX ORDER — HYDROCHLOROTHIAZIDE 12.5 MG/1
TABLET ORAL
COMMUNITY
Start: 2017-05-19 | End: 2017-12-15 | Stop reason: ALTCHOICE

## 2017-05-30 RX ORDER — LOSARTAN POTASSIUM 100 MG/1
TABLET ORAL
COMMUNITY
Start: 2017-05-09 | End: 2017-05-30 | Stop reason: ALTCHOICE

## 2017-05-30 RX ORDER — NITROGLYCERIN 0.4 MG/1
TABLET SUBLINGUAL
COMMUNITY
Start: 2017-05-08 | End: 2018-07-18

## 2017-05-30 NOTE — PROGRESS NOTES
Chief Complaint   Patient presents with    Diabetes     Follow up, Fasting    Cholesterol Problem    Hypertension     1. Have you been to the ER, urgent care clinic since your last visit? Hospitalized since your last visit? No    2. Have you seen or consulted any other health care providers outside of the [address] [place] [address] since your last visit? Include any pap smears or colon screening.  No

## 2017-05-30 NOTE — PATIENT INSTRUCTIONS

## 2017-05-30 NOTE — PROGRESS NOTES
HISTORY OF PRESENT ILLNESS  Isha Bird is a 73 y.o. male. He was seen for follow up on diabetes, HTN, dyslipidemia, h/o CAD  He just had stress test 3 weeks back due to worsening SOB by Dr Jory Rocha and was informed that stress test is normal.  HPI  Cardiovascular Review  The patient has hypertension, hyperlipidemia, coronary artery disease, status post coronary artery stenting and had 2 vessel PCI on 11/11/2016. He reports taking medications as instructed, no medication side effects noted, notes stable dyspnea on exertion, no change, no swelling of ankles, no orthostatic dizziness or lightheadedness, no orthopnea or paroxysmal nocturnal dyspnea, no palpitations, no intermittent claudication symptoms. Diet and Lifestyle: generally follows a low fat low cholesterol diet, generally follows a low sodium diet, nonsmoker. Lab review: labs reviewed and discussed with patient. Follows Dr Audelia Preciado: Effient, ASA, Metoprolol 50 mg XL, Losartan/hctz 50/12.5 mg    Lab Results   Component Value Date/Time    Cholesterol, total 177 01/06/2017 02:40 PM    HDL Cholesterol 47 01/06/2017 02:40 PM    LDL, calculated 112 01/06/2017 02:40 PM    VLDL, calculated 18 01/06/2017 02:40 PM    Triglyceride 89 01/06/2017 02:40 PM       Endocrine Review  He is seen for diabetes. Since last visit he reports: no polyuria or polydipsia, no chest pain, dyspnea or TIA's, no numbness, tingling or pain in extremities, no unusual visual symptoms, weight has decreased, no significant changes. Home glucose monitoring: is performed sporadically, nonfasting values range 160-200  He is checking his sugars one per day. He reports medication compliance: compliant all of the time. Medication side effects: none. Diabetic diet compliance: noncompliant some of the time. Lab review: labs reviewed and discussed with patient. Eye exam: UTD.     On Metformin 1 gm BID, Glipizide 10 mg BID and Januvia 50 mg  Lab Results Component Value Date/Time    Hemoglobin A1c 7.8 01/06/2017 02:40 PM          Review of Systems   Constitutional: Negative for chills, fever and malaise/fatigue. HENT: Negative for congestion, ear pain, sore throat and tinnitus. Eyes: Negative for blurred vision, double vision, pain and discharge. Respiratory: Negative for cough, shortness of breath and wheezing. Cardiovascular: Negative for chest pain, palpitations and leg swelling. Gastrointestinal: Negative for abdominal pain, blood in stool, constipation, diarrhea, nausea and vomiting. Genitourinary: Negative for dysuria, frequency, hematuria and urgency. Musculoskeletal: Negative for back pain, joint pain and myalgias. Leg cramps   Skin: Negative for rash. Neurological: Negative for dizziness, tremors, seizures and headaches. Endo/Heme/Allergies: Negative for polydipsia. Does not bruise/bleed easily. Psychiatric/Behavioral: Negative for depression and substance abuse. The patient is not nervous/anxious. Physical Exam   Constitutional: He is oriented to person, place, and time. He appears well-developed and well-nourished. HENT:   Head: Normocephalic and atraumatic. Right Ear: External ear normal.   Mouth/Throat: Oropharynx is clear and moist. No oropharyngeal exudate. Eyes: Conjunctivae and EOM are normal. Pupils are equal, round, and reactive to light. No scleral icterus. Neck: Normal range of motion. Neck supple. No JVD present. No thyromegaly present. Cardiovascular: Normal rate, regular rhythm, normal heart sounds and intact distal pulses. No murmur heard. Pulses:       Carotid pulses are 2+ on the right side with bruit  Systolic murmur on aortic area   Pulmonary/Chest: Effort normal and breath sounds normal. He has no wheezes. Abdominal: Soft. Bowel sounds are normal. He exhibits no distension and no mass. Musculoskeletal: Normal range of motion. He exhibits no edema or tenderness.    Feet:warm, good capillary refill, no trophic changes or ulcerative lesions, normal DP and PT pulses, normal monofilament exam and normal sensory exam     Lymphadenopathy:     He has no cervical adenopathy. Neurological: He is alert and oriented to person, place, and time. He has normal reflexes. No cranial nerve deficit. Skin: Skin is warm and dry. No rash noted. He is not diaphoretic. Psychiatric: He has a normal mood and affect. Nursing note and vitals reviewed. ASSESSMENT and PLAN  Kiley Wilson was seen today for diabetes, cholesterol problem and hypertension. Diagnoses and all orders for this visit:    Type 2 diabetes mellitus with hyperglycemia, without long-term current use of insulin ([facility])  -      DIABETES FOOT EXAM  -     METABOLIC PANEL, COMPREHENSIVE  -     HEMOGLOBIN A1C WITH EAG  -     MICROALBUMIN, UR, RAND W/ MICROALBUMIN/CREA RATIO    Dyslipidemia, goal LDL below 489  -     METABOLIC PANEL, COMPREHENSIVE  -     LIPID PANEL    Essential hypertension with goal blood pressure less than 769/16  -     METABOLIC PANEL, COMPREHENSIVE    Nonrheumatic aortic valve stenosis    Screening for viral disease  -     HEPATITIS C AB    Colon cancer screening  -     REFERRAL TO GASTROENTEROLOGY    Discussed lifestyle issues and health guidance given  Patient was given an after visit summary which includes diagnoses, vital signs, current medications, instructions and references & authorized prescriptions . Results of labs will be conveyed to patient, once available. Pt verbalized instructions I provided and expressed understanding of discussion that was held today. Follow-up Disposition:  Return in about 1 month (around 6/30/2017) for medicare wellness.

## 2017-05-30 NOTE — MR AVS SNAPSHOT
Visit Information Date & Time Provider Department Dept. Phone Encounter #  
 5/30/2017  8:20 AM Travis Etienne  [place] [phone] [id number] Follow-up Instructions Return in about 1 month (around 6/30/2017) for medicare wellness. Upcoming Health Maintenance Date Due Hepatitis C Screening 1950 DTaP/Tdap/Td series (1 - Tdap) 10/5/1972 FOBT Q 1 YEAR AGE 50-75 10/5/2001 ZOSTER VACCINE AGE 60> 10/5/2011 Pneumococcal 65+ Low/Medium Risk (1 of 2 - PCV13) 10/5/2016 MEDICARE YEARLY EXAM 10/5/2016 MICROALBUMIN Q1 4/19/2017 HEMOGLOBIN A1C Q6M 7/6/2017 INFLUENZA AGE 9 TO ADULT 8/1/2017 FOOT EXAM Q1 9/21/2017 EYE EXAM RETINAL OR DILATED Q1 12/6/2017 LIPID PANEL Q1 1/6/2018 GLAUCOMA SCREENING Q2Y 12/6/2018 Allergies as of 5/30/2017  Review Complete On: 5/30/2017 By: Travis Etienne MD  
 No Known Allergies Current Immunizations  Reviewed on 11/10/2016 Name Date Influenza Vaccine 10/20/2015 10:00 AM, 1/27/2015  9:30 AM, 10/22/2013 Influenza Vaccine (Quad) PF 11/12/2016 10:10 AM  
  
 Not reviewed this visit You Were Diagnosed With   
  
 Codes Comments Type 2 diabetes mellitus with hyperglycemia, without long-term current use of insulin (HCC)    -  Primary ICD-10-CM: E11.65 ICD-9-CM: 250.00, 790.29 Dyslipidemia, goal LDL below 100     ICD-10-CM: E78.5 ICD-9-CM: 272.4 Essential hypertension with goal blood pressure less than 130/85     ICD-10-CM: I10 
ICD-9-CM: 401.9 Nonrheumatic aortic valve stenosis     ICD-10-CM: I35.0 ICD-9-CM: 424.1 Screening for viral disease     ICD-10-CM: Z11.59 
ICD-9-CM: V73.99 Colon cancer screening     ICD-10-CM: Z12.11 ICD-9-CM: V76.51 Vitals BP Pulse Temp Resp Height(growth percentile) Weight(growth percentile)  136/70 (BP 1 Location: Right arm, BP Patient Position: Sitting) 72 97.9 °F (36.6 °C) (Oral) 16 5' 6\" (1.676 m) 142 lb (64.4 kg) SpO2 BMI Smoking Status 98% 22.92 kg/m2 Never Smoker Vitals History BMI and BSA Data Body Mass Index Body Surface Area  
 22.92 kg/m 2 1.73 m 2 Preferred Pharmacy Pharmacy Name Phone [facility] [phone] Your Updated Medication List  
  
   
This list is accurate as of: 5/30/17  8:49 AM.  Always use your most recent med list.  
  
  
  
  
 aspirin 81 mg tablet Take 81 mg by mouth daily. glipiZIDE 10 mg tablet Commonly known as:  Yusra Gilding Take 1 Tab by mouth two (2) times a day. glucose blood VI test strips strip Commonly known as:  Nimo Schlichter Check sugar once daily  
  
 hydroCHLOROthiazide 12.5 mg tablet Commonly known as:  HYDRODIURIL  
  
 lancets 33 gauge Misc Commonly known as: One Touch Cnade Larger Check sugar once daily  
  
 losartan-hydroCHLOROthiazide 50-12.5 mg per tablet Commonly known as:  HYZAAR Take 1 Tab by mouth daily. metFORMIN 1,000 mg tablet Commonly known as:  GLUCOPHAGE Take 1 Tab by mouth two (2) times daily (with meals). metoprolol succinate 50 mg XL tablet Commonly known as:  TOPROL-XL Take 1 Tab by mouth daily. nitroglycerin 0.4 mg SL tablet Commonly known as:  NITROSTAT  
  
 prasugrel 10 mg tablet Commonly known as:  EFFIENT Take 1 Tab by mouth daily. pravastatin 40 mg tablet Commonly known as:  PRAVACHOL Take 1 Tab by mouth nightly. SITagliptin 50 mg tablet Commonly known as:  Lorella Nuzhat Take 1 Tab by mouth daily. therapeutic multivitamin tablet Commonly known as:  [facility] Take 1 Tab by mouth daily. We Performed the Following HEMOGLOBIN A1C WITH EAG [40058 CPT(R)] HEPATITIS C AB [36520 CPT(R)]  DIABETES FOOT EXAM [HM7 Custom] LIPID PANEL [00992 CPT(R)] METABOLIC PANEL, COMPREHENSIVE [18591 CPT(R)] MICROALBUMIN, UR, RAND W/ MICROALBUMIN/CREA RATIO [id number] CPT(R)] REFERRAL TO GASTROENTEROLOGY [VWA78 Custom] Comments:  
 Please evaluate patient for screening colonoscopy. Follow-up Instructions Return in about 1 month (around 6/30/2017) for medicare wellness. Referral Information Referral ID Referred By Referred To  
  
 [id number] [facility] [address] [phone] [address] Visits Status Start Date End Date 1 New Request 5/30/17 5/30/18 If your referral has a status of pending review or denied, additional information will be sent to support the outcome of this decision. Patient Instructions Learning About Diabetes Food Guidelines Your Care Instructions Meal planning is important to manage diabetes. It helps keep your blood sugar at a target level (which you set with your doctor). You don't have to eat special foods. You can eat what your family eats, including sweets once in a while. But you do have to pay attention to how often you eat and how much you eat of certain foods. You may want to work with a dietitian or a certified diabetes educator (CDE) to help you plan meals and snacks. A dietitian or CDE can also help you lose weight if that is one of your goals. What should you know about eating carbs? Managing the amount of carbohydrate (carbs) you eat is an important part of healthy meals when you have diabetes. Carbohydrate is found in many foods. · Learn which foods have carbs. And learn the amounts of carbs in different foods. ¨ Bread, cereal, pasta, and rice have about 15 grams of carbs in a serving. A serving is 1 slice of bread (1 ounce), ½ cup of cooked cereal, or 1/3 cup of cooked pasta or rice. ¨ Fruits have 15 grams of carbs in a serving.  A serving is 1 small fresh fruit, such as an apple or orange; ½ of a banana; ½ cup of cooked or canned fruit; ½ cup of fruit juice; 1 cup of melon or raspberries; or 2 tablespoons of dried fruit. ¨ Milk and no-sugar-added yogurt have 15 grams of carbs in a serving. A serving is 1 cup of milk or 2/3 cup of no-sugar-added yogurt. ¨ Starchy vegetables have 15 grams of carbs in a serving. A serving is ½ cup of mashed potatoes or sweet potato; 1 cup winter squash; ½ of a small baked potato; ½ cup of cooked beans; or ½ cup cooked corn or green peas. · Learn how much carbs to eat each day and at each meal. A dietitian or CDE can teach you how to keep track of the amount of carbs you eat. This is called carbohydrate counting. · If you are not sure how to count carbohydrate grams, use the Plate Method to plan meals. It is a good, quick way to make sure that you have a balanced meal. It also helps you spread carbs throughout the day. ¨ Divide your plate by types of foods. Put non-starchy vegetables on half the plate, meat or other protein food on one-quarter of the plate, and a grain or starchy vegetable in the final quarter of the plate. To this you can add a small piece of fruit and 1 cup of milk or yogurt, depending on how many carbs you are supposed to eat at a meal. 
· Try to eat about the same amount of carbs at each meal. Do not \"save up\" your daily allowance of carbs to eat at one meal. 
· Proteins have very little or no carbs per serving. Examples of proteins are beef, chicken, turkey, fish, eggs, tofu, cheese, cottage cheese, and peanut butter. A serving size of meat is 3 ounces, which is about the size of a deck of cards. Examples of meat substitute serving sizes (equal to 1 ounce of meat) are 1/4 cup of cottage cheese, 1 egg, 1 tablespoon of peanut butter, and ½ cup of tofu. How can you eat out and still eat healthy? · Learn to estimate the serving sizes of foods that have carbohydrate.  If you measure food at home, it will be easier to estimate the amount in a serving of restaurant food. · If the meal you order has too much carbohydrate (such as potatoes, corn, or baked beans), ask to have a low-carbohydrate food instead. Ask for a salad or green vegetables. · If you use insulin, check your blood sugar before and after eating out to help you plan how much to eat in the future. · If you eat more carbohydrate at a meal than you had planned, take a walk or do other exercise. This will help lower your blood sugar. What else should you know? · Limit saturated fat, such as the fat from meat and dairy products. This is a healthy choice because people who have diabetes are at higher risk of heart disease. So choose lean cuts of meat and nonfat or low-fat dairy products. Use olive or canola oil instead of butter or shortening when cooking. · Don't skip meals. Your blood sugar may drop too low if you skip meals and take insulin or certain medicines for diabetes. · Check with your doctor before you drink alcohol. Alcohol can cause your blood sugar to drop too low. Alcohol can also cause a bad reaction if you take certain diabetes medicines. Follow-up care is a key part of your treatment and safety. Be sure to make and go to all appointments, and call your doctor if you are having problems. It's also a good idea to know your test results and keep a list of the medicines you take. Where can you learn more? Go to http://pardeep-harjit.info/. Enter V916 in the search box to learn more about \"Learning About Diabetes Food Guidelines. \" Current as of: May 23, 2016 Content Version: 11.2 © 6047-2161 Healthwise, Incorporated. Care instructions adapted under license by [employer] (which disclaims liability or warranty for this information).  If you have questions about a medical condition or this instruction, always ask your healthcare professional. Lorena Wolfe[employer] disclaims any warranty or liability for your use of this information. Introducing [facility]! Dear [place]: Thank you for requesting a [employer] account. Our records indicate that you already have an active [employer] account. You can access your account anytime at [email or web address] Did you know that you can access your hospital and ER discharge instructions at any time in [employer]? You can also review all of your test results from your hospital stay or ER visit. Additional Information If you have questions, please visit the Frequently Asked Questions section of the [employer] website at [email or web address] Remember, [employer] is NOT to be used for urgent needs. For medical emergencies, dial 911. Now available from your iPhone and Android! Please provide this summary of care documentation to your next provider. Your primary care clinician is listed as Artur Every. If you have any questions after today's visit, please call [phone].

## 2017-05-31 LAB
ALBUMIN SERPL-MCNC: 4.6 G/DL (ref 3.6–4.8)
ALBUMIN/CREAT UR: 292.8 MG/G CREAT (ref 0–30)
ALBUMIN/GLOB SERPL: 1.5 {RATIO} (ref 1.2–2.2)
ALP SERPL-CCNC: 82 IU/L (ref 39–117)
ALT SERPL-CCNC: 28 IU/L (ref 0–44)
AST SERPL-CCNC: 26 IU/L (ref 0–40)
BILIRUB SERPL-MCNC: 0.2 MG/DL (ref 0–1.2)
BUN SERPL-MCNC: 24 MG/DL (ref 8–27)
BUN/CREAT SERPL: 22 (ref 10–24)
CALCIUM SERPL-MCNC: 9.9 MG/DL (ref 8.6–10.2)
CHLORIDE SERPL-SCNC: 103 MMOL/L (ref 96–106)
CHOLEST SERPL-MCNC: 176 MG/DL (ref 100–199)
CO2 SERPL-SCNC: 22 MMOL/L (ref 18–29)
CREAT SERPL-MCNC: 1.09 MG/DL (ref 0.76–1.27)
CREAT UR-MCNC: 91.2 MG/DL
EST. AVERAGE GLUCOSE BLD GHB EST-MCNC: 148 MG/DL
GLOBULIN SER CALC-MCNC: 3.1 G/DL (ref 1.5–4.5)
GLUCOSE SERPL-MCNC: 104 MG/DL (ref 65–99)
HBA1C MFR BLD: 6.8 % (ref 4.8–5.6)
HCV AB S/CO SERPL IA: <0.1 S/CO RATIO (ref 0–0.9)
HDLC SERPL-MCNC: 41 MG/DL
INTERPRETATION, 910389: NORMAL
LDLC SERPL CALC-MCNC: 97 MG/DL (ref 0–99)
MICROALBUMIN UR-MCNC: 267 UG/ML
POTASSIUM SERPL-SCNC: 5.6 MMOL/L (ref 3.5–5.2)
PROT SERPL-MCNC: 7.7 G/DL (ref 6–8.5)
SODIUM SERPL-SCNC: 140 MMOL/L (ref 134–144)
TRIGL SERPL-MCNC: 188 MG/DL (ref 0–149)
VLDLC SERPL CALC-MCNC: 38 MG/DL (ref 5–40)

## 2017-06-23 ENCOUNTER — TELEPHONE (OUTPATIENT)
Dept: FAMILY MEDICINE CLINIC | Age: 67
End: 2017-06-23

## 2017-06-23 DIAGNOSIS — I35.0 NONRHEUMATIC AORTIC VALVE STENOSIS: Primary | ICD-10-CM

## 2017-06-23 DIAGNOSIS — R06.09 DYSPNEA ON EXERTION: ICD-10-CM

## 2017-06-23 NOTE — TELEPHONE ENCOUNTER
Spoke to wife. She is very concerned that patient is getting very tiered and SOB with minimum exertion for few weeks He can't sleep at night and has to get up frequently to catch his breath. He was just seen by cardiology last month, for same concern. Reviewed summary received from Dr Tammi Lewis. EKG was ok and he ordered myocardial perfusion study. As per wife, patient was told that stress test result was normal.  During his last visit with me, I did notice worsening of aortic stenosis murmur. Will order Echo to r/o systolic heart failure and will refer to another cardiologist as per their request, to get second opinion.

## 2017-06-23 NOTE — TELEPHONE ENCOUNTER
----- Message from Ursula Aly sent at 6/23/2017  8:34 AM EDT -----  Regarding: Dr Deion Pendleton  Pt wife would like to speak the doctor about some issues, pt is having, please call wife Ghassan Dodson at [phone].

## 2017-06-23 NOTE — TELEPHONE ENCOUNTER
Outbound call to pt's wife Mark Han, she states she does not want to speak with a nurse, only wants to speak with Dr Lucita Barone in regards to her concerns.  Mark Han is aware that Dr Lucita Barone is out of the office until 06/26/2017, understanding voiced

## 2017-06-29 ENCOUNTER — HOSPITAL ENCOUNTER (OUTPATIENT)
Dept: NON INVASIVE DIAGNOSTICS | Age: 67
Discharge: HOME | End: 2017-06-29
Attending: FAMILY MEDICINE
Payer: MEDICARE

## 2017-06-29 DIAGNOSIS — R06.09 DYSPNEA ON EXERTION: ICD-10-CM

## 2017-06-29 DIAGNOSIS — I35.0 NONRHEUMATIC AORTIC VALVE STENOSIS: ICD-10-CM

## 2017-06-29 PROCEDURE — 93306 TTE W/DOPPLER COMPLETE: CPT

## 2017-06-29 NOTE — LETTER
7/3/2017 3:25 PM 
 
Mr. Rincon Holiday [address] [place] [address] Dear Per Presser Popat: 
 
Please find your most recent results below. Resulted Orders 2D ECHO COMPLETE ADULT (TTE) W OR WO CONTR Narrative [facility], [address] 
[phone] Transthoracic Echocardiogram 
 
Patient: Collette Boatman 
MRN: [id number] ACCT #: [de-identified] : 1950 Age: 73 years Gender: Male Height: 66 in 
Weight: 141.7 lb 
BSA: 1.73 m squared BP: 136 / 70 mmHg Study date: 2017 Status: Routine Location: Echo lab 
[facility] #: [id number] Allergies: NO KNOWN ALLERGIES Referring Physician:  Melanie Maradiaga. Martinez Yu MD 
Reading Group:  [facility] Group Technologist:  Rosibel Alfaro [facility] Reading Physician:  Elaine Verde. BECCA Au: 
Left ventricle: Systolic function was normal. Ejection fraction was 
estimated to be 60 %. There were no regional wall motion abnormalities. Wall thickness was mildly increased. Doppler parameters were consistent 
with abnormal left ventricular relaxation (grade 1 diastolic dysfunction). Left atrium: The atrium was mildly dilated. Mitral valve: There was mild regurgitation. Tricuspid valve: There was mild regurgitation. Pulmonary artery systolic 
pressure: 50 mmHg. INDICATIONS: SOB PROCEDURE: This was a routine study. The study included complete 2D 
imaging, M-mode, complete spectral Doppler, and color Doppler. Systolic 
blood pressure was 136 mmHg, at the start of the study. Diastolic blood 
pressure was 70 mmHg, at the start of the study. Image quality was 
adequate. LEFT VENTRICLE: Size was normal. Systolic function was normal. Ejection 
fraction was estimated to be 60 %. There were no regional wall motion 
abnormalities. Wall thickness was mildly increased. DOPPLER: Doppler 
parameters were consistent with abnormal left ventricular relaxation 
(grade 1 diastolic dysfunction). RIGHT VENTRICLE: The size was normal. Systolic function was normal. 
 
LEFT ATRIUM: The atrium was mildly dilated. RIGHT ATRIUM: Size was normal. 
 
MITRAL VALVE: There was mild calcification of the anterior leaflet. DOPPLER: There was mild regurgitation. AORTIC VALVE: The valve was trileaflet. Leaflets exhibited mildly to 
moderately increased thickness and mild calcification. DOPPLER: There was 
no significant stenosis with mean gradient of 9mmHg. TRICUSPID VALVE: Normal valve structure. DOPPLER: There was mild 
regurgitation. Pulmonary artery systolic pressure: 50 mmHg. PULMONIC VALVE: Not well visualized, but normal Doppler findings. AORTA: The root exhibited normal size. PERICARDIUM: There was no pericardial effusion. SYSTEM MEASUREMENT TABLES 
 
2D Ao Diam: 2.7 cm 
LA Diam: 4.2 cm LAAs A2C: 16.3 cm2 LAAs A4C: 14.1 cm2 LAESV A-L A2C: 42 ml 
LAESV A-L A4C: 30.6 ml 
LAESV Index (A-L): 21 ml/m2 LAESV MOD A2C: 41.3 ml 
LAESV MOD A4C: 30 ml 
LAESV(A-L): 36.3 ml 
LALs A2C: 5.4 cm LALs A4C: 5.5 cm 
LVOT Diam: 2 cm 
%FS: 36.9 % EDV(Teich): 95.1 ml 
EF(Teich): 66.9 % 
ESV(Teich): 31.5 ml IVSd: 0.8 cm LVIDd: 4.6 cm 
LVIDs: 2.9 cm 
LVPWd: 0.9 cm 
SV(Teich): 63.6 ml 
 
CW 
AV Env. Ti: 316.8 ms 
AV VTI: 46.1 cm 
AV Vmax: 2.1 m/s AV Vmean: 1.5 m/s AV maxP.9 mmHg AV meanP.3 mmHg IVRT: 71.4 ms 
TR Vmax: 3.2 m/s 
TR maxP.7 mmHg HR: 78.4 BPM 
 
PW 
HUY (VTI): 1.5 cm2 HUY Vmax: 1.4 cm2 AVAI (VTI): 0 cm2/m2 AVAI Vmax: 0 cm2/m2 LVOT Env. Ti: 339.7 ms 
LVOT VTI: 22.6 cm 
LVOT Vmax: 1 m/s LVOT Vmean: 0.7 m/s LVOT maxPG: 3.9 mmHg LVOT meanP mmHg E' Lat: 0.1 m/s 
E' Sept: 0.1 m/s 
E/E' Lat: 15.7 E/E' Sept: 16.2 MV A [place]: 1.2 m/s 
MV Dec [place]: 3.9 m/s2 MV DecT: 255.4 ms 
MV E [place]: 1 m/s 
MV E/A Ratio: 0.9 MV PHT: 74.1 ms 
MVA By PHT: 3 cm2 LVSI Dopp: 39.5 ml/m2 LVSV Dopp: 68.3 ml Prepared and E-signed by 
 
Radha Doherty. Tila Smalls M.D. Signed 2017 16:50:53 RECOMMENDATIONS: 
Results as reviewed and discussed by Dr. Merlene Morgan. Discussed result of recent Echo. Systolic function is preserved but has grade 1 diastolic dysfunction. Also significant pulmonary hypertension. Aortic stenosis with calcification. Will defer to cardilogy about management of pulmonary hypertension Please call me if you have any questions: [phone] Sincerely, Echo Lab 1 [facility]

## 2017-06-29 NOTE — PROGRESS NOTES
Discussed result of recent Echo. Systolic function is preserved but has grade 1 diastolic dysfunction. Also significant pulmonary hypertension. Aortic stenosis with calcification.   Will defer to cardilogy about management of pulmonary hypertension

## 2017-07-03 NOTE — PROGRESS NOTES
Results discussed with patient by Dr. Isaías Aguero via [place] and phone. Letter sent with results and instructions.

## 2017-07-12 ENCOUNTER — TELEPHONE (OUTPATIENT)
Dept: FAMILY MEDICINE CLINIC | Age: 67
End: 2017-07-12

## 2017-07-12 NOTE — TELEPHONE ENCOUNTER
Referral Request Telephone Call      Insurance Name:     Shira Phillips [address] ID:  [id number]   Specialist Name: Dr. Katerin Russo   Type of Specialty:  [facility]    Address of Specialist:  [address] [place] [address]   Phone/Fax Number of Specialist: [phone] [facility] [phone]   Diagnosis: R06.09   Appointment Date: 7- @  9:00   NPI    Tax ID

## 2017-07-13 ENCOUNTER — OFFICE VISIT (OUTPATIENT)
Dept: CARDIOLOGY CLINIC | Age: 67
End: 2017-07-13

## 2017-07-13 VITALS
WEIGHT: 146.2 LBS | SYSTOLIC BLOOD PRESSURE: 110 MMHG | HEIGHT: 65 IN | BODY MASS INDEX: 24.36 KG/M2 | DIASTOLIC BLOOD PRESSURE: 70 MMHG | HEART RATE: 73 BPM

## 2017-07-13 DIAGNOSIS — Z78.9 STATIN INTOLERANCE: ICD-10-CM

## 2017-07-13 DIAGNOSIS — I25.10 CORONARY ARTERY DISEASE INVOLVING NATIVE CORONARY ARTERY OF NATIVE HEART WITHOUT ANGINA PECTORIS: ICD-10-CM

## 2017-07-13 DIAGNOSIS — E11.65 TYPE 2 DIABETES MELLITUS WITH HYPERGLYCEMIA, WITHOUT LONG-TERM CURRENT USE OF INSULIN (HCC): ICD-10-CM

## 2017-07-13 DIAGNOSIS — R06.09 DYSPNEA ON EXERTION: Primary | ICD-10-CM

## 2017-07-13 DIAGNOSIS — E78.00 HYPERCHOLESTEREMIA: ICD-10-CM

## 2017-07-13 DIAGNOSIS — Z12.11 COLON CANCER SCREENING: ICD-10-CM

## 2017-07-13 RX ORDER — FUROSEMIDE 20 MG/1
20 TABLET ORAL
Qty: 30 TAB | Refills: 3 | Status: SHIPPED | OUTPATIENT
Start: 2017-07-14 | End: 2018-03-16 | Stop reason: ALTCHOICE

## 2017-07-13 NOTE — PROGRESS NOTES
Erick Teixeira Popat     1950       Monik Melendez MD, [facility]  Date of Visit-7/13/2017   PCP is Salvador Carlos MD   [facility] [place]  [facility]  HPI:  Floyd Chavez is a 73 y.o. male     New patient referred by Dr Antwan Hinds for DONALDSON and dizziness. Patient with NSTEMI in 11/10/16 with marginal increase in troponin, significant CAD to LAD and OM2 with drug eluting stent to both vessels. He states chest pain from in the past has resolved. He is doing well currently from a cardiac standpoint. He does state he does not sleep well. He saw Dr Skip Atkinson and had echo done at hospital. Denies chest pain, edema, syncope or shortness of breath at rest, has no tachycardia, palpitations or sense of arrhythmia. Assessment/Plan:       CAD  -with JEREMY x2 in November  -angina free   -continue betablocker, statin, and ASA    -eventual colonoscopy for GI screening  -suggest he wait till November since DAPT     DONALDSON  -recent echo with EF 60% but appears to have pulmonary HTN  -will start lasix 20 mg x3 days per week and see if he improves     DM2  -on oral medications     follow up in 6 weeks     Statin intolerance  -only able to take pravastatin      Impression:   1. Dyspnea on exertion    2. Coronary artery disease involving native coronary artery of native heart without angina pectoris    3. Colon cancer screening    4. Type 2 diabetes mellitus with hyperglycemia, without long-term current use of insulin (HCC)    5. Statin intolerance    6. Hypercholesteremia           Social hx: nonsmoker, alcohol 2-3 times per week,    family history includes Elevated Lipids in his brother and brother; Heart Disease in his father; Hypertension in his mother. No Known Allergies   ROS-except as noted above. . Review of Systems   Constitutional: Negative for diaphoresis, fever and malaise/fatigue. HENT: Negative for ear pain, hearing loss, nosebleeds and tinnitus.     Eyes: Negative for blurred vision, double vision and pain. Respiratory: Positive for shortness of breath. Negative for cough, hemoptysis, sputum production, wheezing and stridor. Cardiovascular: Negative for chest pain, palpitations, orthopnea, claudication and leg swelling. Gastrointestinal: Negative for abdominal pain, blood in stool, constipation, diarrhea, heartburn, melena, nausea and vomiting. Genitourinary: Negative for dysuria, frequency and urgency. Musculoskeletal: Negative for back pain, falls, joint pain, myalgias and neck pain. Skin: Negative for rash. Neurological: Negative for dizziness, sensory change, seizures, loss of consciousness, weakness and headaches. Endo/Heme/Allergies: Does not bruise/bleed easily. Psychiatric/Behavioral: Negative for depression, hallucinations and memory loss. The patient is not nervous/anxious and does not have insomnia. see supplement sheet, initialed and to be scanned by staff  Past Medical History:   Diagnosis Date    CAD (coronary artery disease) 11/10/2016    NSTEMI & 2 stents    Deafness 10/28/2012    DM (diabetes mellitus) ([facility] [address]     Elevated cholesterol     Hypertension       Social Hx= reports that he has never smoked. He has never used smokeless tobacco. He reports that he drinks about 1.2 oz of alcohol per week  He reports that he does not use illicit drugs. Exam and Labs:    Visit Vitals    /70 (BP 1 Location: Left arm, BP Patient Position: Sitting)    Pulse 73    Ht 5' 5\" (1.651 m)    Wt 146 lb 3.2 oz (66.3 kg)    BMI 24.33 kg/m2      Constitutional:  NAD, comfortable  Head: NC,AT. Eyes: No scleral icterus. Neck:  Neck supple. No JVD present. Throat: moist mucous membranes. Chest: Effort normal & normal respiratory excursion . Neurological: alert, conversant and oriented . Skin: Skin is not cold. No obvious systemic rash noted. Not diaphoretic. No erythema. Psychiatric:  Grossly normal mood and affect.   Behavior appears normal. Extremities:  no clubbing or cyanosis. Abdomen: non distended    Lungs:breath sounds normal. No stridor. distress, wheezes or  Rales. Heart:    normal rate, regular rhythm, normal S1, S2, no murmurs, rubs, clicks or gallops , PMI non displaced. Edema: Edema is none. Lab Results   Component Value Date/Time    Cholesterol, total 176 05/30/2017 08:56 AM    HDL Cholesterol 41 05/30/2017 08:56 AM    LDL, calculated 97 05/30/2017 08:56 AM    Triglyceride 188 05/30/2017 08:56 AM     No results found for this or any previous visit. Lab Results   Component Value Date/Time    Sodium 140 05/30/2017 08:56 AM    Potassium 5.6 05/30/2017 08:56 AM    Chloride 103 05/30/2017 08:56 AM    CO2 22 05/30/2017 08:56 AM    Anion gap 7 11/11/2016 05:17 AM    Glucose 104 05/30/2017 08:56 AM    BUN 24 05/30/2017 08:56 AM    Creatinine 1.09 05/30/2017 08:56 AM    BUN/Creatinine ratio 22 05/30/2017 08:56 AM    GFR est AA 82 05/30/2017 08:56 AM    GFR est non-AA 71 05/30/2017 08:56 AM    Calcium 9.9 05/30/2017 08:56 AM      Wt Readings from Last 3 Encounters:   07/13/17 146 lb 3.2 oz (66.3 kg)   05/30/17 142 lb (64.4 kg)   01/05/17 145 lb (65.8 kg)      BP Readings from Last 3 Encounters:   07/13/17 110/70   05/30/17 136/70   01/05/17 142/84        Current Outpatient Prescriptions   Medication Sig    hydroCHLOROthiazide (HYDRODIURIL) 12.5 mg tablet     glipiZIDE (GLUCOTROL) 10 mg tablet Take 1 Tab by mouth two (2) times a day.  SITagliptin (JANUVIA) 50 mg tablet Take 1 Tab by mouth daily.  metFORMIN (GLUCOPHAGE) 1,000 mg tablet Take 1 Tab by mouth two (2) times daily (with meals).  metoprolol succinate (TOPROL-XL) 50 mg XL tablet Take 1 Tab by mouth daily.  pravastatin (PRAVACHOL) 40 mg tablet Take 1 Tab by mouth nightly.     glucose blood VI test strips (ONETOUCH VERIO) strip Check sugar once daily    lancets (ONE TOUCH DELICA) 33 gauge misc Check sugar once daily    prasugrel (EFFIENT) 10 mg tablet Take 1 Tab by mouth daily.  therapeutic multivitamin (THERAGRAN) tablet Take 1 Tab by mouth daily.  aspirin 81 mg tablet Take 81 mg by mouth daily.  nitroglycerin (NITROSTAT) 0.4 mg SL tablet     losartan-hydroCHLOROthiazide (HYZAAR) 50-12.5 mg per tablet Take 1 Tab by mouth daily. No current facility-administered medications for this visit. Impression see above.     Written by Gorge Clements, as dictated by Rodney Augustin MD.

## 2017-07-13 NOTE — MR AVS SNAPSHOT
Visit Information Date & Time Provider Department Dept. Phone Encounter #  
 7/13/2017  9:00 AM Nacho Gastelum MD [facility] [phone] [id number] Upcoming Health Maintenance Date Due DTaP/Tdap/Td series (1 - Tdap) 10/5/1972 FOBT Q 1 YEAR AGE 50-75 10/5/2001 ZOSTER VACCINE AGE 60> 10/5/2011 Pneumococcal 65+ Low/Medium Risk (1 of 2 - PCV13) 10/5/2016 MEDICARE YEARLY EXAM 10/5/2016 INFLUENZA AGE 9 TO ADULT 8/1/2017 HEMOGLOBIN A1C Q6M 11/30/2017 EYE EXAM RETINAL OR DILATED Q1 12/6/2017 FOOT EXAM Q1 5/30/2018 MICROALBUMIN Q1 5/30/2018 LIPID PANEL Q1 5/30/2018 GLAUCOMA SCREENING Q2Y 12/6/2018 Allergies as of 7/13/2017  Review Complete On: 7/13/2017 By: Joel Reynolds LPN No Known Allergies Current Immunizations  Reviewed on 11/10/2016 Name Date Influenza Vaccine 10/20/2015 10:00 AM, 1/27/2015  9:30 AM, 10/22/2013 Influenza Vaccine (Quad) PF 11/12/2016 10:10 AM  
  
 Not reviewed this visit You Were Diagnosed With   
  
 Codes Comments Dyspnea on exertion    -  Primary ICD-10-CM: R06.09 
ICD-9-CM: 786.09 Vitals BP Pulse Height(growth percentile) Weight(growth percentile) BMI Smoking Status 110/70 (BP 1 Location: Left arm, BP Patient Position: Sitting) 73 5' 5\" (1.651 m) 146 lb 3.2 oz (66.3 kg) 24.33 kg/m2 Never Smoker Vitals History BMI and BSA Data Body Mass Index Body Surface Area  
 24.33 kg/m 2 1.74 m 2 Preferred Pharmacy Pharmacy Name Phone [facility] [phone] Your Updated Medication List  
  
   
This list is accurate as of: 7/13/17 11:32 AM.  Always use your most recent med list.  
  
  
  
  
 aspirin 81 mg tablet Take 81 mg by mouth daily. furosemide 20 mg tablet Commonly known as:  LASIX Take 1 Tab by mouth every Monday, Wednesday, Friday. Start taking on:  7/14/2017 glipiZIDE 10 mg tablet Commonly known as:  Christian Clyde Take 1 Tab by mouth two (2) times a day. glucose blood VI test strips strip Commonly known as:  García Rayo Check sugar once daily  
  
 hydroCHLOROthiazide 12.5 mg tablet Commonly known as:  HYDRODIURIL  
  
 lancets 33 gauge Misc Commonly known as: One Touch Timmothy Ascencio Check sugar once daily  
  
 losartan-hydroCHLOROthiazide 50-12.5 mg per tablet Commonly known as:  HYZAAR Take 1 Tab by mouth daily. metFORMIN 1,000 mg tablet Commonly known as:  GLUCOPHAGE Take 1 Tab by mouth two (2) times daily (with meals). metoprolol succinate 50 mg XL tablet Commonly known as:  TOPROL-XL Take 1 Tab by mouth daily. nitroglycerin 0.4 mg SL tablet Commonly known as:  NITROSTAT  
  
 prasugrel 10 mg tablet Commonly known as:  EFFIENT Take 1 Tab by mouth daily. pravastatin 40 mg tablet Commonly known as:  PRAVACHOL Take 1 Tab by mouth nightly. SITagliptin 50 mg tablet Commonly known as:  [place] Phu Take 1 Tab by mouth daily. therapeutic multivitamin tablet Commonly known as:  [facility] Take 1 Tab by mouth daily. Prescriptions Sent to Pharmacy Refills  
 furosemide (LASIX) 20 mg tablet 3 Starting on: 7/14/2017 Sig: Take 1 Tab by mouth every Monday, Wednesday, Friday. Class: Normal  
 Pharmacy: [address] [place] [address] #: [phone] Route: Oral  
  
We Performed the Following AMB POC EKG ROUTINE W/ 12 LEADS, INTER & REP [95723 CPT(R)] Patient Instructions Add Lasix 20mg on Monday Wednesday and Friday f/u in office in 6 weeks Introducing [facility]! Dear Parker Thomas: Thank you for requesting a [employer] account. Our records indicate that you already have an active [employer] account. You can access your account anytime at [email or web address] Did you know that you can access your hospital and ER discharge instructions at any time in classmarkets? You can also review all of your test results from your hospital stay or ER visit. Additional Information If you have questions, please visit the Frequently Asked Questions section of the classmarkets website at [email or web address] Remember, classmarkets is NOT to be used for urgent needs. For medical emergencies, dial 911. Now available from your iPhone and Android! Please provide this summary of care documentation to your next provider. Your primary care clinician is listed as La Crowder. If you have any questions after today's visit, please call [phone].

## 2017-07-30 PROBLEM — E78.00 HYPERCHOLESTEREMIA: Status: ACTIVE | Noted: 2017-07-30

## 2017-07-30 PROBLEM — Z78.9 STATIN INTOLERANCE: Status: ACTIVE | Noted: 2017-07-30

## 2017-07-30 PROBLEM — I25.10 CORONARY ARTERY DISEASE INVOLVING NATIVE CORONARY ARTERY OF NATIVE HEART WITHOUT ANGINA PECTORIS: Status: ACTIVE | Noted: 2017-07-30

## 2017-07-30 PROBLEM — I10 HYPERTENSION, ESSENTIAL: Status: ACTIVE | Noted: 2017-07-30

## 2017-10-04 RX ORDER — LANCETS 33 GAUGE
EACH MISCELLANEOUS
Qty: 100 LANCET | Refills: 1 | Status: SHIPPED | OUTPATIENT
Start: 2017-10-04 | End: 2018-09-06

## 2017-10-04 NOTE — TELEPHONE ENCOUNTER
Pharmacy on file verified.      Requested Prescriptions     Pending Prescriptions Disp Refills    lancets (ONE TOUCH DELICA) 33 gauge misc 484 Lancet 1     Sig: Check sugar once daily

## 2017-12-14 ENCOUNTER — OFFICE VISIT (OUTPATIENT)
Dept: CARDIOLOGY CLINIC | Age: 67
End: 2017-12-14

## 2017-12-14 VITALS
HEIGHT: 65 IN | BODY MASS INDEX: 24.32 KG/M2 | DIASTOLIC BLOOD PRESSURE: 70 MMHG | RESPIRATION RATE: 16 BRPM | WEIGHT: 146 LBS | OXYGEN SATURATION: 80 % | HEART RATE: 80 BPM | SYSTOLIC BLOOD PRESSURE: 140 MMHG

## 2017-12-14 DIAGNOSIS — I25.10 CORONARY ARTERY DISEASE INVOLVING NATIVE CORONARY ARTERY OF NATIVE HEART WITHOUT ANGINA PECTORIS: Primary | ICD-10-CM

## 2017-12-14 DIAGNOSIS — I35.0 NONRHEUMATIC AORTIC VALVE STENOSIS: ICD-10-CM

## 2017-12-14 DIAGNOSIS — E78.00 HYPERCHOLESTEREMIA: ICD-10-CM

## 2017-12-14 DIAGNOSIS — Z78.9 STATIN INTOLERANCE: ICD-10-CM

## 2017-12-14 DIAGNOSIS — E11.21 TYPE 2 DIABETES MELLITUS WITH NEPHROPATHY (HCC): ICD-10-CM

## 2017-12-14 DIAGNOSIS — I10 HYPERTENSION, ESSENTIAL: ICD-10-CM

## 2017-12-14 DIAGNOSIS — E78.5 DYSLIPIDEMIA, GOAL LDL BELOW 100: ICD-10-CM

## 2017-12-14 NOTE — MR AVS SNAPSHOT
Visit Information Date & Time Provider Department Dept. Phone Encounter #  
 12/14/2017  4:20 PM Meera Barakat MD [facility] [phone] [id number] Your Appointments 1/3/2018  8:00 AM  
ROUTINE CARE with Geo Michael MD  
[facility]) Appt Note: FOLLOW UP VISIT -  [address] [facility] [address] [id number]  
[phone]  
  
   
 [address] [address] Upcoming Health Maintenance Date Due DTaP/Tdap/Td series (1 - Tdap) 10/5/1972 FOBT Q 1 YEAR AGE 50-75 10/5/2001 ZOSTER VACCINE AGE 60> 8/5/2011 Pneumococcal 65+ Low/Medium Risk (1 of 2 - PCV13) 10/5/2016 MEDICARE YEARLY EXAM 10/5/2016 HEMOGLOBIN A1C Q6M 11/30/2017 EYE EXAM RETINAL OR DILATED Q1 12/6/2017 FOOT EXAM Q1 5/30/2018 MICROALBUMIN Q1 5/30/2018 LIPID PANEL Q1 5/30/2018 GLAUCOMA SCREENING Q2Y 12/6/2018 Allergies as of 12/14/2017  Review Complete On: 12/14/2017 By: Chong Boo No Known Allergies Current Immunizations  Reviewed on 11/8/2017 Name Date Influenza Nasal Vaccine 11/7/2017 Influenza Vaccine 10/20/2015 10:00 AM, 1/27/2015  9:30 AM, 10/22/2013 Influenza Vaccine (Quad) PF 11/12/2016 10:10 AM  
  
 Not reviewed this visit You Were Diagnosed With   
  
 Codes Comments Coronary artery disease involving native coronary artery of native heart without angina pectoris    -  Primary ICD-10-CM: I25.10 ICD-9-CM: 414.01 Vitals BP Pulse Resp Height(growth percentile) Weight(growth percentile) SpO2  
 140/70 (BP 1 Location: Left arm, BP Patient Position: Sitting) 80 16 5' 5\" (1.651 m) 146 lb (66.2 kg) (!) 80% BMI Smoking Status 24.3 kg/m2 Never Smoker BMI and BSA Data Body Mass Index Body Surface Area  
 24.3 kg/m 2 1.74 m 2 Preferred Pharmacy Pharmacy Name Phone [facility] [phone] Your Updated Medication List  
  
   
This list is accurate as of: 12/14/17  5:04 PM.  Always use your most recent med list.  
  
  
  
  
 aspirin 81 mg tablet Take 81 mg by mouth daily. furosemide 20 mg tablet Commonly known as:  LASIX Take 1 Tab by mouth every Monday, Wednesday, Friday. glipiZIDE 10 mg tablet Commonly known as:  Món[place] Take 1 Tab by mouth two (2) times a day. glucose blood VI test strips strip Commonly known as:  [place] Board Check sugar once daily  
  
 hydroCHLOROthiazide 12.5 mg tablet Commonly known as:  HYDRODIURIL  
  
 lancets 33 gauge Misc Commonly known as: One Touch Kirk Valentina Check sugar once daily  
  
 losartan-hydroCHLOROthiazide 50-12.5 mg per tablet Commonly known as:  HYZAAR Take 1 Tab by mouth daily. metFORMIN 1,000 mg tablet Commonly known as:  GLUCOPHAGE Take 1 Tab by mouth two (2) times daily (with meals). nitroglycerin 0.4 mg SL tablet Commonly known as:  NITROSTAT  
  
 prasugrel 10 mg tablet Commonly known as:  EFFIENT Take 1 Tab by mouth daily. pravastatin 40 mg tablet Commonly known as:  PRAVACHOL Take 1 Tab by mouth nightly. SITagliptin 50 mg tablet Commonly known as:  [place] Take 1 Tab by mouth daily. therapeutic multivitamin tablet Commonly known as:  [facility] Take 1 Tab by mouth daily. To-Do List   
 05/14/2018 ECHO:  ECHO EXERCISE STRESS Patient Instructions You will need to follow up in clinic with Dr. Sondra Clark in 6 months. You will need to schedule a stress echo. It can be on same day as office visit. Stop taking metoprolol. Introducing [facility] & HE[facility] SERVICES! Dear Miguel Lawrence: Thank you for requesting a [employer] account. Our records indicate that you already have an active [employer] account.   You can access your account anytime at [email or web address] Did you know that you can access your hospital and ER discharge instructions at any time in [employer]? You can also review all of your test results from your hospital stay or ER visit. Additional Information If you have questions, please visit the Frequently Asked Questions section of the [employer] website at [email or web address] [email or web address] Remember, [employer] is NOT to be used for urgent needs. For medical emergencies, dial 911. Now available from your iPhone and Android! Please provide this summary of care documentation to your next provider. Your primary care clinician is listed as Jewel Sultana. If you have any questions after today's visit, please call [phone].

## 2017-12-14 NOTE — PROGRESS NOTES
Neal Kendall     1950       Monik Dailey MD, [facility] [place]  Date of Visit-12/14/2017   PCP is Lissette Metz MD   [facility] [place]  [facility]  HPI:  Sophia Valente is a 78 y.o. male   Follow up of CAD with NSTEMI in November of 2016    Overall the pt states he is doing well. He states that he is feeling a little fatigued and tired. The pt reports that he is able to exert himself without any problems. Denies chest pain, edema, syncope or shortness of breath at rest, has no tachycardia, palpitations or sense of arrhythmia. Assessment/Plan:     1. CAD NSTEMI 11/2016 with marginal increase in troponin had significant disease in the LAD and OM2 with drug eluting stents to both vessels he is now pain free and can continue aspirin and statin, due to fatigue I will stop the beta blocker he has been switched from Effient to Plavix and will continue the Plavix and ASA but can stop them for any procedures now. 2. DONALDSON EF is 60% has mild pulmonary hypertension which seems to have resolved by symptoms. 3. Statin intolerance but able to take pravastatin   4. Diabetes mellitus type 2 on oral meds. The primary encounter diagnosis was Coronary artery disease involving native coronary artery of native heart without angina pectoris. Diagnoses of Hypercholesteremia, Nonrheumatic aortic valve stenosis, Hypertension, essential, Dyslipidemia, goal LDL below 100, Statin intolerance, and Type 2 diabetes mellitus with nephropathy ([address] were also pertinent to this visit.      -- follow up in 6 months with a stress echo  --stop metoprolol  ---call pharmacy to confirm should be taking Plavix and Hyzaar but not additional HCT  ---ok to take melatonin  ---keep healthy diet, lifestyle    Future Appointments  Date Time Provider Mina Delgado   1/3/2018 8:00 AM Lissette Metz  [facility] [place]   6/18/2018 9:00 AM NARGIS, [address] [address]   6/18/2018 10:00 AM Wicho Springer  [address]   6/18/2018 10:00 AM ECHO, [address] as noted above. . A complete cardiac and respiratory are reviewed and negative except as above ; Resp-denies wheezing  or productive cough,. Const- No unusual weight loss or fever; Neuro-no recent seizure or CVA ; GI- No BRBPR, abdom pain, bloating ; - no  hematuria   see supplement sheet, initialed and to be scanned by staff  Past Medical History:   Diagnosis Date    CAD (coronary artery disease) 11/10/2016    NSTEMI & 2 stents    Deafness 10/28/2012    DM (diabetes mellitus) ([facility] [address]     Elevated cholesterol     Hypertension       Social Hx= reports that he has never smoked. He has never used smokeless tobacco. He reports that he drinks about 1.2 oz of alcohol per week  He reports that he does not use illicit drugs. Exam and Labs:  /70 (BP 1 Location: Left arm, BP Patient Position: Sitting)  Pulse 80  Resp 16  Ht 5' 5\" (1.651 m)  Wt 146 lb (66.2 kg)  SpO2 (!) 80%  BMI 24.3 kg/c3Fvgdtkiyfmpsli:  NAD, comfortable  Head: NC,AT. Eyes: No scleral icterus. Neck:  Neck supple. No JVD present. Throat: moist mucous membranes. Chest: Effort normal & normal respiratory excursion . Neurological: alert, conversant and oriented . Skin: Skin is not cold. No obvious systemic rash noted. Not diaphoretic. No erythema. Psychiatric:  Grossly normal mood and affect. Behavior appears normal. Extremities:  no clubbing or cyanosis. Abdomen: non distended    Lungs:breath sounds normal. No stridor. distress, wheezes or  Rales. Heart: normal rate, regular rhythm, normal S1, S2, no murmurs, rubs, clicks or gallops , PMI non displaced. Edema: Edema is none.   Lab Results   Component Value Date/Time    Cholesterol, total 176 05/30/2017 08:56 AM    HDL Cholesterol 41 05/30/2017 08:56 AM    LDL, calculated 97 05/30/2017 08:56 AM    Triglyceride 188 05/30/2017 08:56 AM     Lab Results   Component Value Date/Time    Sodium 140 05/30/2017 08:56 AM    Potassium 5.6 05/30/2017 08:56 AM    Chloride 103 05/30/2017 08:56 AM    CO2 22 05/30/2017 08:56 AM    Anion gap 7 11/11/2016 05:17 AM    Glucose 104 05/30/2017 08:56 AM    BUN 24 05/30/2017 08:56 AM    Creatinine 1.09 05/30/2017 08:56 AM    BUN/Creatinine ratio 22 05/30/2017 08:56 AM    GFR est AA 82 05/30/2017 08:56 AM    GFR est non-AA 71 05/30/2017 08:56 AM    Calcium 9.9 05/30/2017 08:56 AM      Wt Readings from Last 3 Encounters:   12/14/17 146 lb (66.2 kg)   07/13/17 146 lb 3.2 oz (66.3 kg)   05/30/17 142 lb (64.4 kg)      BP Readings from Last 3 Encounters:   12/14/17 140/70   07/13/17 110/70   05/30/17 136/70      Current Outpatient Prescriptions   Medication Sig    lancets (ONE TOUCH DELICA) 33 gauge misc Check sugar once daily    nitroglycerin (NITROSTAT) 0.4 mg SL tablet     glipiZIDE (GLUCOTROL) 10 mg tablet Take 1 Tab by mouth two (2) times a day.  SITagliptin (JANUVIA) 50 mg tablet Take 1 Tab by mouth daily.  metFORMIN (GLUCOPHAGE) 1,000 mg tablet Take 1 Tab by mouth two (2) times daily (with meals).  losartan-hydroCHLOROthiazide (HYZAAR) 50-12.5 mg per tablet Take 1 Tab by mouth daily.  metoprolol succinate (TOPROL-XL) 50 mg XL tablet Take 1 Tab by mouth daily.  pravastatin (PRAVACHOL) 40 mg tablet Take 1 Tab by mouth nightly.  glucose blood VI test strips (ONETOUCH VERIO) strip Check sugar once daily    prasugrel (EFFIENT) 10 mg tablet Take 1 Tab by mouth daily.  therapeutic multivitamin (THERAGRAN) tablet Take 1 Tab by mouth daily.  aspirin 81 mg tablet Take 81 mg by mouth daily.  furosemide (LASIX) 20 mg tablet Take 1 Tab by mouth every Monday, Wednesday, Friday.  hydroCHLOROthiazide (HYDRODIURIL) 12.5 mg tablet      No current facility-administered medications for this visit. Impression see above.       Written by Barney Mak, as dictated by Иван Layne MD.

## 2017-12-14 NOTE — PATIENT INSTRUCTIONS
You will need to follow up in clinic with Dr. Frandy Mata in 6 months. You will need to schedule a stress echo. It can be on same day as office visit. Stop taking metoprolol.

## 2017-12-15 ENCOUNTER — TELEPHONE (OUTPATIENT)
Dept: CARDIOLOGY CLINIC | Age: 67
End: 2017-12-15

## 2017-12-15 RX ORDER — METOPROLOL SUCCINATE 50 MG/1
TABLET, EXTENDED RELEASE ORAL DAILY
COMMUNITY
End: 2018-03-16 | Stop reason: ALTCHOICE

## 2017-12-15 RX ORDER — CLOPIDOGREL BISULFATE 75 MG/1
75 TABLET ORAL DAILY
Qty: 30 TAB | Refills: 11 | Status: SHIPPED | OUTPATIENT
Start: 2017-12-15 | End: 2018-06-25

## 2017-12-15 RX ORDER — HYDROCHLOROTHIAZIDE 25 MG/1
25 TABLET ORAL DAILY
Qty: 30 TAB | Refills: 5 | Status: SHIPPED | OUTPATIENT
Start: 2017-12-15 | End: 2018-03-16 | Stop reason: ALTCHOICE

## 2017-12-15 NOTE — TELEPHONE ENCOUNTER
Verified patient with two types of identifiers. Spoke with Tigist Ralphs, pt's wife per pt request and updated her on new medication. Patient's wife verbalizes understanding. And will call with any questions or concerns.

## 2017-12-15 NOTE — TELEPHONE ENCOUNTER
Spoke with pharmacy staff. Confirmed that patient is not currently taking Hydrochlorothiazide 12.5 mg, losartan-hydrochlorothiazide (Hyzaar) 5-12.5 mg, or Effient 10 mg. Staff stated that patient had Metoprolol ER 50 mg, daily and Losartan 100 mg, daily filled 12/5/17.      Confirmed patient was taking Plavix 75 mg, daily last filled 9/15/17 (90 day supply)

## 2017-12-15 NOTE — TELEPHONE ENCOUNTER
Verbal order per Dr. Kimmy Gupta    Patient was in clinic yesterday. Dr. Kimmy Gupta wanted pharmacy called to verify medication changes.

## 2018-01-16 ENCOUNTER — TELEPHONE (OUTPATIENT)
Dept: FAMILY MEDICINE CLINIC | Age: 68
End: 2018-01-16

## 2018-01-16 NOTE — TELEPHONE ENCOUNTER
Referral Request Telephone Call      Insurance Name:     Jaqueline Parker (KFPWBRNO REPLACEMENT/ADVANTAGE - HMO)    Insurance ID:  [id number]   Specialist Name: Dr. Rajat Carrasco   Type of Specialty:  ENT    Address of Specialist:  [address] [place] [address], [address] [place] [address]   Phone/Fax Number of Specialist: Phone#[phone]  [facility]#[phone]   Diagnosis: Hearing test-wears hearing aids   Appointment Date: 1/24/2018   NPI    Tax ID

## 2018-01-19 DIAGNOSIS — H91.93 BILATERAL DEAFNESS: Primary | ICD-10-CM

## 2018-01-19 NOTE — TELEPHONE ENCOUNTER
Spoke with stone at Dr Gomes Spraggs office, she stated that the patient only need a order from his pcp for the hearing test.

## 2018-01-27 DIAGNOSIS — E11.65 TYPE 2 DIABETES MELLITUS WITH HYPERGLYCEMIA, WITHOUT LONG-TERM CURRENT USE OF INSULIN (HCC): ICD-10-CM

## 2018-01-27 RX ORDER — SITAGLIPTIN 50 MG/1
TABLET, FILM COATED ORAL
Qty: 90 TAB | Refills: 1 | Status: SHIPPED | COMMUNITY
Start: 2018-01-27 | End: 2018-08-29 | Stop reason: SDUPTHER

## 2018-03-16 ENCOUNTER — OFFICE VISIT (OUTPATIENT)
Dept: FAMILY MEDICINE CLINIC | Age: 68
End: 2018-03-16

## 2018-03-16 VITALS
SYSTOLIC BLOOD PRESSURE: 118 MMHG | HEART RATE: 82 BPM | HEIGHT: 65 IN | OXYGEN SATURATION: 98 % | WEIGHT: 143.8 LBS | RESPIRATION RATE: 16 BRPM | BODY MASS INDEX: 23.96 KG/M2 | TEMPERATURE: 97.9 F | DIASTOLIC BLOOD PRESSURE: 56 MMHG

## 2018-03-16 DIAGNOSIS — Z12.11 COLON CANCER SCREENING: ICD-10-CM

## 2018-03-16 DIAGNOSIS — I10 ESSENTIAL HYPERTENSION WITH GOAL BLOOD PRESSURE LESS THAN 130/85: ICD-10-CM

## 2018-03-16 DIAGNOSIS — E78.5 DYSLIPIDEMIA, GOAL LDL BELOW 100: ICD-10-CM

## 2018-03-16 DIAGNOSIS — I25.10 CORONARY ARTERY DISEASE INVOLVING NATIVE CORONARY ARTERY OF NATIVE HEART WITHOUT ANGINA PECTORIS: ICD-10-CM

## 2018-03-16 DIAGNOSIS — E11.65 TYPE 2 DIABETES MELLITUS WITH HYPERGLYCEMIA, WITHOUT LONG-TERM CURRENT USE OF INSULIN (HCC): Primary | ICD-10-CM

## 2018-03-16 PROBLEM — E11.21 TYPE 2 DIABETES MELLITUS WITH NEPHROPATHY (HCC): Status: RESOLVED | Noted: 2017-12-14 | Resolved: 2018-03-16

## 2018-03-16 RX ORDER — HYDROCHLOROTHIAZIDE 25 MG/1
25 TABLET ORAL
COMMUNITY
Start: 2018-02-17 | End: 2018-08-15 | Stop reason: SDUPTHER

## 2018-03-16 RX ORDER — LOSARTAN POTASSIUM 100 MG/1
TABLET ORAL
Status: ON HOLD | COMMUNITY
Start: 2018-02-12 | End: 2018-06-22

## 2018-03-16 NOTE — ASSESSMENT & PLAN NOTE
Well Controlled, based on history, physical exam and review of pertinent labs, studies and medications; meds reconciled; continue current treatment plan, lifestyle modifications recommended, medication compliance emphasized. Key Antihyperglycemic Medications             JANUVIA 50 mg tablet  (Taking) TAKE ONE TABLET BY MOUTH ONCE DAILY    glipiZIDE (GLUCOTROL) 10 mg tablet  (Taking) Take 1 Tab by mouth two (2) times a day. metFORMIN (GLUCOPHAGE) 1,000 mg tablet  (Taking) Take 1 Tab by mouth two (2) times daily (with meals). SITagliptin (JANUVIA) 50 mg tablet Take 1 Tab by mouth daily. Other Key Diabetic Medications             losartan (COZAAR) 100 mg tablet  (Taking)     pravastatin (PRAVACHOL) 40 mg tablet  (Taking) Take 1 Tab by mouth nightly. losartan-hydroCHLOROthiazide (HYZAAR) 50-12.5 mg per tablet Take 1 Tab by mouth daily.         Lab Results   Component Value Date/Time    Hemoglobin A1c 6.8 05/30/2017 08:56 AM    Glucose 104 05/30/2017 08:56 AM    Creatinine 1.09 05/30/2017 08:56 AM    Microalb/Creat ratio (ug/mg creat.) 292.8 05/30/2017 08:56 AM    Cholesterol, total 176 05/30/2017 08:56 AM    HDL Cholesterol 41 05/30/2017 08:56 AM    LDL, calculated 97 05/30/2017 08:56 AM    Triglyceride 188 05/30/2017 08:56 AM     Diabetic Foot and Eye Exam HM Status   Topic Date Due    Eye Exam  12/06/2017    Diabetic Foot Care  05/30/2018

## 2018-03-16 NOTE — PATIENT INSTRUCTIONS
Hip Arthritis: Exercises  Your Care Instructions  Here are some examples of exercises for hip arthritis. Start each exercise slowly. Ease off the exercise if you start to have pain. Your doctor or physical therapist will tell you when you can start these exercises and which ones will work best for you. How to do the exercises  Straight-leg raises to the outside    1. Lie on your side, with your affected hip on top. 2. Tighten the front thigh muscles of your top leg to keep your knee straight. 3. Keep your hip and your leg straight in line with the rest of your body, and keep your knee pointing forward. Do not drop your hip back. 4. Lift your top leg straight up toward the ceiling, about 12 inches off the floor. Hold for about 6 seconds, then slowly lower your leg. 5. Repeat 8 to 12 times. 6. Switch legs and repeat steps 1 through 5, even if only one hip is sore. Straight-leg raises to the inside    1. Lie on your side with your affected hip on the floor. 2. You can either prop up your other leg on a chair, or you can bend that knee and put that foot in front of your other knee. Do not drop your hip back. 3. Tighten the muscles on the front thigh of your bottom leg to straighten that knee. 4. Keep your kneecap pointing forward and your leg straight, and lift your bottom leg up toward the ceiling about 6 inches. Hold for about 6 seconds, then lower slowly. 5. Repeat 8 to 12 times. 6. Switch legs and repeat steps 1 through 5, even if only one hip is sore. Hip hike    1. Stand sideways on the bottom step of a staircase, and hold on to the banister or wall. 2. Keeping both knees straight, lift your good leg off the step and let it hang down. Then hike your good hip up to the same level as your affected hip or a little higher. 3. Repeat 8 to 12 times. 4. Switch legs and repeat steps 1 through 3, even if only one hip is sore. Bridging    1. Lie on your back with both knees bent.  Your knees should be bent about 90 degrees. 2. Then push your feet into the floor, squeeze your buttocks, and lift your hips off the floor until your shoulders, hips, and knees are all in a straight line. 3. Hold for about 6 seconds as you continue to breathe normally, and then slowly lower your hips back down to the floor and rest for up to 10 seconds. 4. Repeat 8 to 12 times. Hamstring stretch (lying down)    1. Lie flat on your back with your legs straight. If you feel discomfort in your back, place a small towel roll under your lower back. 2. Holding the back of your affected leg, lift your leg straight up and toward your body until you feel a stretch at the back of your thigh. 3. Hold the stretch for at least 30 seconds. 4. Repeat 2 to 4 times. 5. Switch legs and repeat steps 1 through 4, even if only one hip is sore. Standing quadriceps stretch    1. If you are not steady on your feet, hold on to a chair, counter, or wall. You can also lie on your stomach or your side to do this exercise. 2. Bend the knee of the leg you want to stretch, and reach behind you to grab the front of your foot or ankle with the hand on the same side. For example, if you are stretching your right leg, use your right hand. 3. Keeping your knees next to each other, pull your foot toward your buttock until you feel a gentle stretch across the front of your hip and down the front of your thigh. Your knee should be pointed directly to the ground, and not out to the side. 4. Hold the stretch for at least 15 to 30 seconds. 5. Repeat 2 to 4 times. 6. Switch legs and repeat steps 1 through 5, even if only one hip is sore. Hip rotator stretch    1. Lie on your back with both knees bent and your feet flat on the floor. 2. Put the ankle of your affected leg on your opposite thigh near your knee. 3. Use your hand to gently push your knee away from your body until you feel a gentle stretch around your hip.   4. Hold the stretch for 15 to 30 seconds. 5. Repeat 2 to 4 times. 6. Repeat steps 1 through 5, but this time use your hand to gently pull your knee toward your opposite shoulder. 7. Switch legs and repeat steps 1 through 6, even if only one hip is sore. Knee-to-chest    1. Lie on your back with your knees bent and your feet flat on the floor. 2. Bring your affected leg to your chest, keeping the other foot flat on the floor (or keeping the other leg straight, whichever feels better on your lower back). 3. Keep your lower back pressed to the floor. Hold for at least 15 to 30 seconds. 4. Relax, and lower the knee to the starting position. 5. Repeat 2 to 4 times. 6. Switch legs and repeat steps 1 through 5, even if only one hip is sore. 7. To get more stretch, put your other leg flat on the floor while pulling your knee to your chest.  Clamshell    1. Lie on your side, with your affected hip on top. Keep your feet and knees together and your knees bent. 2. Raise your top knee, but keep your feet together. Do not let your hips roll back. Your legs should open up like a clamshell. 3. Hold for 6 seconds. 4. Slowly lower your knee back down. Rest for 10 seconds. 5. Repeat 8 to 12 times. 6. Switch legs and repeat steps 1 through 5, even if only one hip is sore. Follow-up care is a key part of your treatment and safety. Be sure to make and go to all appointments, and call your doctor if you are having problems. It's also a good idea to know your test results and keep a list of the medicines you take. Where can you learn more? Go to http://pardeep-harjit.info/. Enter T692 in the search box to learn more about \"Hip Arthritis: Exercises. \"  Current as of: March 21, 2017  Content Version: 11.4  © 2851-6532 [employer]. Care instructions adapted under license by [employer] (which disclaims liability or warranty for this information).  If you have questions about a medical condition or this instruction, always ask your healthcare professional. [place] [address] any warranty or liability for your use of this information.

## 2018-03-16 NOTE — PROGRESS NOTES
HISTORY OF PRESENT ILLNESS  Howard Jones is a 78 y.o. male. Was seen for follow up on DM, dyslipidemia, HTN, CAD. Not seen in office for almost a year ( last OV 05/2017)  HPI  Cardiovascular Review  The patient has hypertension, hyperlipidemia, coronary artery disease, status post coronary artery stenting and had 2 vessel PCI on 11/11/2016. He reports taking medications as instructed, no medication side effects noted, notes stable dyspnea on exertion, no change, no swelling of ankles, no orthostatic dizziness or lightheadedness, no orthopnea or paroxysmal nocturnal dyspnea, no palpitations, no intermittent claudication symptoms. Diet and Lifestyle: generally follows a low fat low cholesterol diet, generally follows a low sodium diet, nonsmoker. Lab review: labs reviewed and discussed with patient. Follows Dr Joseph Fox  Medicines:Plavix ASA, Metoprolol 50 mg XL, Losartan 100 mg, Hctz 25 mg, Pravastatin 40 mg ( tolerating well )    Lab Results   Component Value Date/Time    Cholesterol, total 176 05/30/2017 08:56 AM    HDL Cholesterol 41 05/30/2017 08:56 AM    LDL, calculated 97 05/30/2017 08:56 AM    VLDL, calculated 38 05/30/2017 08:56 AM    Triglyceride 188 (H) 05/30/2017 08:56 AM       Endocrine Review  He is seen for diabetes. Since last visit he reports: no polyuria or polydipsia, no chest pain, dyspnea or TIA's, no numbness, tingling or pain in extremities, no unusual visual symptoms, weight has decreased, no significant changes. Home glucose monitoring: is performed sporadically, nonfasting values range 160-200  He is checking his sugars one per day. He reports medication compliance: compliant all of the time. Medication side effects: none. Diabetic diet compliance: noncompliant some of the time. Lab review: labs reviewed and discussed with patient. Eye exam: UTD.     On Metformin 1 gm BID, Glipizide 10 mg BID and Januvia 50 mg  Lab Results   Component Value Date/Time    Hemoglobin A1c 6.8 (H) 05/30/2017 08:56 AM        Review of Systems   Constitutional: Negative for chills, fever and malaise/fatigue. HENT: Negative for congestion, ear pain, sore throat and tinnitus. Eyes: Negative for blurred vision, double vision, pain and discharge. Respiratory: Negative for cough, shortness of breath and wheezing. Cardiovascular: Negative for chest pain, palpitations and leg swelling. Gastrointestinal: Negative for abdominal pain, blood in stool, constipation, diarrhea, nausea and vomiting. Genitourinary: Negative for dysuria, frequency, hematuria and urgency. Musculoskeletal: Negative for back pain, joint pain and myalgias. Leg cramps   Skin: Negative for rash. Neurological: Negative for dizziness, tremors, seizures and headaches. Endo/Heme/Allergies: Negative for polydipsia. Does not bruise/bleed easily. Psychiatric/Behavioral: Negative for depression and substance abuse. The patient is not nervous/anxious. Physical Exam   Constitutional: He is oriented to person, place, and time. He appears well-developed and well-nourished. HENT:   Head: Normocephalic and atraumatic. Right Ear: External ear normal.   Mouth/Throat: Oropharynx is clear and moist. No oropharyngeal exudate. Eyes: Conjunctivae and EOM are normal. Pupils are equal, round, and reactive to light. No scleral icterus. Neck: Normal range of motion. Neck supple. No JVD present. No thyromegaly present. Cardiovascular: Normal rate, regular rhythm, normal heart sounds and intact distal pulses. No murmur heard. Pulses:       Carotid pulses are 2+ on the right side with bruit  Systolic murmur on aortic area   Pulmonary/Chest: Effort normal and breath sounds normal. He has no wheezes. Abdominal: Soft. Bowel sounds are normal. He exhibits no distension and no mass. Musculoskeletal: Normal range of motion. He exhibits no edema or tenderness.    Feet:warm, good capillary refill, no trophic changes or ulcerative lesions, normal DP and PT pulses, normal monofilament exam and normal sensory exam     Lymphadenopathy:     He has no cervical adenopathy. Neurological: He is alert and oriented to person, place, and time. He has normal reflexes. No cranial nerve deficit. Skin: Skin is warm and dry. No rash noted. He is not diaphoretic. Psychiatric: He has a normal mood and affect. Nursing note and vitals reviewed. ASSESSMENT and PLAN  Diagnoses and all orders for this visit:    1. Type 2 diabetes mellitus with hyperglycemia, without long-term current use of insulin ([facility]  Assessment & Plan:  Well Controlled, based on history, physical exam and review of pertinent labs, studies and medications; meds reconciled; continue current treatment plan, lifestyle modifications recommended, medication compliance emphasized. Key Antihyperglycemic Medications             JANUVIA 50 mg tablet  (Taking) TAKE ONE TABLET BY MOUTH ONCE DAILY    glipiZIDE (GLUCOTROL) 10 mg tablet  (Taking) Take 1 Tab by mouth two (2) times a day. metFORMIN (GLUCOPHAGE) 1,000 mg tablet  (Taking) Take 1 Tab by mouth two (2) times daily (with meals). SITagliptin (JANUVIA) 50 mg tablet Take 1 Tab by mouth daily. Other Key Diabetic Medications             losartan (COZAAR) 100 mg tablet  (Taking)     pravastatin (PRAVACHOL) 40 mg tablet  (Taking) Take 1 Tab by mouth nightly. losartan-hydroCHLOROthiazide (HYZAAR) 50-12.5 mg per tablet Take 1 Tab by mouth daily.         Lab Results   Component Value Date/Time    Hemoglobin A1c 6.8 05/30/2017 08:56 AM    Glucose 104 05/30/2017 08:56 AM    Creatinine 1.09 05/30/2017 08:56 AM    Microalb/Creat ratio (ug/mg creat.) 292.8 05/30/2017 08:56 AM    Cholesterol, total 176 05/30/2017 08:56 AM    HDL Cholesterol 41 05/30/2017 08:56 AM    LDL, calculated 97 05/30/2017 08:56 AM    Triglyceride 188 05/30/2017 08:56 AM     Diabetic Foot and Eye Exam HM Status   Topic Date Due    Eye Exam  12/06/2017    Diabetic Foot Care  05/30/2018       Orders:  -     REFERRAL TO OPHTHALMOLOGY  -      DIABETES FOOT EXAM  -     METABOLIC PANEL, COMPREHENSIVE  -     HEMOGLOBIN A1C WITH EAG  -     MICROALBUMIN, UR, RAND W/ MICROALB/CREAT RATIO    2. Dyslipidemia, goal LDL below 506  -     METABOLIC PANEL, COMPREHENSIVE  -     LIPID PANEL    3. Essential hypertension with goal blood pressure less than 151/87  -     METABOLIC PANEL, COMPREHENSIVE    4. Colon cancer screening  -     [facility]    5. Coronary artery disease involving native coronary artery of native heart without angina pectoris  -     METABOLIC PANEL, COMPREHENSIVE  -     LIPID PANEL    Other orders  -     pneumococcal 13 grant conj dip (PREVNAR 13, PF,) 0.5 mL syrg injection; 0.5 mL by IntraMUSCular route once for 1 dose. Discussed lifestyle issues and health guidance given  Patient was given an after visit summary which includes diagnoses, vital signs, current medications, instructions and references & authorized prescriptions . Results of labs will be conveyed to patient, once available. Pt verbalized instructions I provided and expressed understanding of discussion that was held today.   Follow-up Disposition:  Return in about 4 months (around 7/16/2018) for fasting, medicare wellness, physical.

## 2018-03-16 NOTE — PROGRESS NOTES
Chief Complaint   Patient presents with    Follow-up     DM, fasting      1. Have you been to the ER, urgent care clinic since your last visit? Hospitalized since your last visit? No    2. Have you seen or consulted any other health care providers outside of the [address] [place] [address] since your last visit? Include any pap smears or colon screening.  No

## 2018-03-17 ENCOUNTER — TELEPHONE (OUTPATIENT)
Dept: FAMILY MEDICINE CLINIC | Age: 68
End: 2018-03-17

## 2018-03-17 ENCOUNTER — HOSPITAL ENCOUNTER (EMERGENCY)
Age: 68
Discharge: HOME | End: 2018-03-17
Attending: EMERGENCY MEDICINE
Payer: MEDICARE

## 2018-03-17 VITALS
HEIGHT: 65 IN | RESPIRATION RATE: 18 BRPM | DIASTOLIC BLOOD PRESSURE: 74 MMHG | SYSTOLIC BLOOD PRESSURE: 163 MMHG | HEART RATE: 90 BPM | BODY MASS INDEX: 23.82 KG/M2 | TEMPERATURE: 97.5 F | OXYGEN SATURATION: 98 % | WEIGHT: 143 LBS

## 2018-03-17 DIAGNOSIS — E87.5 HYPERKALEMIA: Primary | ICD-10-CM

## 2018-03-17 LAB
ALBUMIN SERPL-MCNC: 4 G/DL (ref 3.5–5)
ALBUMIN SERPL-MCNC: 4.5 G/DL (ref 3.6–4.8)
ALBUMIN/CREAT UR: 846.9 MG/G CREAT (ref 0–30)
ALBUMIN/GLOB SERPL: 1 {RATIO} (ref 1.1–2.2)
ALBUMIN/GLOB SERPL: 1.5 {RATIO} (ref 1.2–2.2)
ALP SERPL-CCNC: 105 U/L (ref 45–117)
ALP SERPL-CCNC: 85 IU/L (ref 39–117)
ALT SERPL-CCNC: 27 IU/L (ref 0–44)
ALT SERPL-CCNC: 34 U/L (ref 12–78)
ANION GAP SERPL CALC-SCNC: 6 MMOL/L (ref 5–15)
AST SERPL-CCNC: 25 U/L (ref 15–37)
AST SERPL-CCNC: 29 IU/L (ref 0–40)
ATRIAL RATE: 86 BPM
BASOPHILS # BLD: 0.1 K/UL (ref 0–0.1)
BASOPHILS NFR BLD: 1 % (ref 0–1)
BILIRUB SERPL-MCNC: 0.2 MG/DL (ref 0.2–1)
BILIRUB SERPL-MCNC: 0.2 MG/DL (ref 0–1.2)
BUN SERPL-MCNC: 17 MG/DL (ref 6–20)
BUN SERPL-MCNC: 17 MG/DL (ref 8–27)
BUN/CREAT SERPL: 15 (ref 12–20)
BUN/CREAT SERPL: 16 (ref 10–24)
CALCIUM SERPL-MCNC: 9.1 MG/DL (ref 8.5–10.1)
CALCIUM SERPL-MCNC: 9.6 MG/DL (ref 8.6–10.2)
CALCULATED P AXIS, ECG09: 55 DEGREES
CALCULATED R AXIS, ECG10: 39 DEGREES
CALCULATED T AXIS, ECG11: 99 DEGREES
CHLORIDE SERPL-SCNC: 107 MMOL/L (ref 96–106)
CHLORIDE SERPL-SCNC: 109 MMOL/L (ref 97–108)
CHOLEST SERPL-MCNC: 184 MG/DL (ref 100–199)
CO2 SERPL-SCNC: 19 MMOL/L (ref 18–29)
CO2 SERPL-SCNC: 23 MMOL/L (ref 21–32)
CREAT SERPL-MCNC: 1.06 MG/DL (ref 0.76–1.27)
CREAT SERPL-MCNC: 1.12 MG/DL (ref 0.7–1.3)
CREAT UR-MCNC: 63.6 MG/DL
DIAGNOSIS, 93000: NORMAL
DIFFERENTIAL METHOD BLD: ABNORMAL
EOSINOPHIL # BLD: 0.3 K/UL (ref 0–0.4)
EOSINOPHIL NFR BLD: 4 % (ref 0–7)
ERYTHROCYTE [DISTWIDTH] IN BLOOD BY AUTOMATED COUNT: 16.2 % (ref 11.5–14.5)
EST. AVERAGE GLUCOSE BLD GHB EST-MCNC: 154 MG/DL
GFR SERPLBLD CREATININE-BSD FMLA CKD-EPI: 73 ML/MIN/1.73
GFR SERPLBLD CREATININE-BSD FMLA CKD-EPI: 84 ML/MIN/1.73
GLOBULIN SER CALC-MCNC: 3.1 G/DL (ref 1.5–4.5)
GLOBULIN SER CALC-MCNC: 4.1 G/DL (ref 2–4)
GLUCOSE SERPL-MCNC: 136 MG/DL (ref 65–100)
GLUCOSE SERPL-MCNC: 142 MG/DL (ref 65–99)
HBA1C MFR BLD: 7 % (ref 4.8–5.6)
HCT VFR BLD AUTO: 34.8 % (ref 36.6–50.3)
HDLC SERPL-MCNC: 47 MG/DL
HGB BLD-MCNC: 10.3 G/DL (ref 12.1–17)
IMM GRANULOCYTES # BLD: 0 K/UL (ref 0–0.04)
IMM GRANULOCYTES NFR BLD AUTO: 0 % (ref 0–0.5)
INTERPRETATION, 910389: NORMAL
LDLC SERPL CALC-MCNC: 111 MG/DL (ref 0–99)
LYMPHOCYTES # BLD: 1.3 K/UL (ref 0.8–3.5)
LYMPHOCYTES NFR BLD: 21 % (ref 12–49)
MCH RBC QN AUTO: 24.1 PG (ref 26–34)
MCHC RBC AUTO-ENTMCNC: 29.6 G/DL (ref 30–36.5)
MCV RBC AUTO: 81.5 FL (ref 80–99)
MICROALBUMIN UR-MCNC: 538.6 UG/ML
MONOCYTES # BLD: 0.6 K/UL (ref 0–1)
MONOCYTES NFR BLD: 10 % (ref 5–13)
NEUTS SEG # BLD: 3.9 K/UL (ref 1.8–8)
NEUTS SEG NFR BLD: 63 % (ref 32–75)
NRBC # BLD: 0 K/UL (ref 0–0.01)
NRBC BLD-RTO: 0 PER 100 WBC
P-R INTERVAL, ECG05: 118 MS
PLATELET # BLD AUTO: 228 K/UL (ref 150–400)
PMV BLD AUTO: 11.2 FL (ref 8.9–12.9)
POTASSIUM SERPL-SCNC: 5.1 MMOL/L (ref 3.5–5.1)
POTASSIUM SERPL-SCNC: 7.1 MMOL/L (ref 3.5–5.2)
PROT SERPL-MCNC: 7.6 G/DL (ref 6–8.5)
PROT SERPL-MCNC: 8.1 G/DL (ref 6.4–8.2)
Q-T INTERVAL, ECG07: 346 MS
QRS DURATION, ECG06: 94 MS
QTC CALCULATION (BEZET), ECG08: 414 MS
RBC # BLD AUTO: 4.27 M/UL (ref 4.1–5.7)
SODIUM SERPL-SCNC: 138 MMOL/L (ref 136–145)
SODIUM SERPL-SCNC: 142 MMOL/L (ref 134–144)
TRIGL SERPL-MCNC: 131 MG/DL (ref 0–149)
VENTRICULAR RATE, ECG03: 86 BPM
VLDLC SERPL CALC-MCNC: 26 MG/DL (ref 5–40)
WBC # BLD AUTO: 6.2 K/UL (ref 4.1–11.1)

## 2018-03-17 PROCEDURE — 85025 COMPLETE CBC W/AUTO DIFF WBC: CPT | Performed by: EMERGENCY MEDICINE

## 2018-03-17 PROCEDURE — 93005 ELECTROCARDIOGRAM TRACING: CPT

## 2018-03-17 PROCEDURE — 36415 COLL VENOUS BLD VENIPUNCTURE: CPT | Performed by: EMERGENCY MEDICINE

## 2018-03-17 PROCEDURE — 99284 EMERGENCY DEPT VISIT MOD MDM: CPT

## 2018-03-17 PROCEDURE — 80053 COMPREHEN METABOLIC PANEL: CPT | Performed by: EMERGENCY MEDICINE

## 2018-03-17 NOTE — TELEPHONE ENCOUNTER
[facility] called about potassium 7.1; no other critical values    Called and left message home number, then called wife's cell and spoke with her about his high K, need to go to ER even if he is feeling well. Discussed that might be a false positive but no way to be sure without rechecking it. High enough this really is an emergency. She expressed understanding. And said they would take him there now.

## 2018-03-17 NOTE — DISCHARGE INSTRUCTIONS
Hyperkalemia: Care Instructions  Your Care Instructions    Hyperkalemia is too much potassium in the blood. Potassium helps keep the right mix of fluids in your body. It also helps your nerves and muscles work as they should. And it keeps your heartbeat in a normal rhythm. Some things can raise potassium levels. These include some health problems, medicines, and kidney problems. (Normally, your kidneys remove extra potassium.)  Too much potassium can cause nausea. It also can cause a heartbeat that isn't normal. But you may not have any symptoms. Too much potassium can be dangerous. That's why it's important to treat it. If you are taking any of the medicines that can raise your levels, your doctor will ask you to stop. You may get medicines to lower your levels. And you may have to limit or not eat foods that have a lot of potassium. Follow-up care is a key part of your treatment and safety. Be sure to make and go to all appointments, and call your doctor if you are having problems. It's also a good idea to know your test results and keep a list of the medicines you take. How can you care for yourself at home? · Take your medicines exactly as prescribed. Call your doctor if you think you are having a problem with your medicine. · Stop taking certain medicines if your doctor asks you to. They may be causing your high potassium levels. If you have concerns about stopping medicine, talk with your doctor. · If you have kidney, heart, or liver disease and have to limit fluids, talk with your doctor before you increase the amount of fluids you drink. If the doctor says it's okay, drink plenty of fluids. This means drinking enough so that your urine is light yellow or clear like water. · Avoid strenuous exercise until your doctor tells you it is okay. · Potassium is in many foods, including vegetables, fruits, and milk products.  Foods high in potassium include bananas, cantaloupe, broccoli, milk, potatoes, and tomatoes. · Low potassium foods include blueberries, raspberries, cucumber, white or brown rice, spaghetti, and macaroni. · Do not use a salt substitute without talking to your doctor first. Most of these are very high in potassium. · Be sure to tell your doctor about any prescription, over-the-counter, or herbal medicines you take. Some of these can raise potassium. When should you call for help? Call 911 anytime you think you may need emergency care. For example, call if:  ? · You passed out (lost consciousness). ? · You have an unusual heartbeat. Your heart may beat fast or skip beats. ?Call your doctor now or seek immediate medical care if:  ? · You have muscle aches. ? · You feel very weak. ? Watch closely for changes in your health, and be sure to contact your doctor if:  ? · You do not get better as expected. Where can you learn more? Go to http://pardeep-harjit.info/. Enter A364 in the search box to learn more about \"Hyperkalemia: Care Instructions. \"  Current as of: May 12, 2017  Content Version: 11.4  © [id number] [employer]. Care instructions adapted under license by [employer] (which disclaims liability or warranty for this information). If you have questions about a medical condition or this instruction, always ask your healthcare professional. [address] any warranty or liability for your use of this information. We hope that we have addressed all of your medical concerns. The examination and treatment you received in the Emergency Department were for an emergent problem and were not intended as complete care. It is important that you follow up with your healthcare provider(s) for ongoing care. If your symptoms worsen or do not improve as expected, and you are unable to reach your usual health care provider(s), you should return to the Emergency Department.       Today's healthcare is undergoing tremendous change, and patient satisfaction surveys are one of the many tools to assess the quality of medical care. You may receive a survey from the [employer] regarding your experience in the Emergency Department. I hope that your experience has been completely positive, particularly the medical care that I provided. As such, please participate in the survey; anything less than excellent does not meet my expectations or intentions. [facility] and [facility] participate in nationally recognized quality of care measures. If your blood pressure is greater than 120/80, as reported below, we urge that you seek medical care to address the potential of high blood pressure, commonly known as hypertension. Hypertension can be hereditary or can be caused by certain medical conditions, pain, stress, or \"white coat syndrome. \"       Please make an appointment with your health care provider(s) for follow up of your Emergency Department visit. VITALS:   Patient Vitals for the past 8 hrs:   Temp Pulse Resp BP SpO2   03/17/18 0530 97.7 °F (36.5 °C) 99 16 184/82 98 %          Thank you for allowing us to provide you with medical care today. We realize that you have many choices for your emergency care needs. Please choose us in the future for any continued health care needs.       Regards,           Rashmi Rouse MD    [facility]   Office: [phone]            Recent Results (from the past 24 hour(s))   METABOLIC PANEL, COMPREHENSIVE    Collection Time: 03/16/18  9:28 AM   Result Value Ref Range    Glucose 142 (H) 65 - 99 mg/dL    BUN 17 8 - 27 mg/dL    Creatinine 1.06 0.76 - 1.27 mg/dL    GFR est non-AA 73 >59 mL/min/1.73    GFR est AA 84 >59 mL/min/1.73    BUN/Creatinine ratio 16 10 - 24    Sodium 142 134 - 144 mmol/L    Potassium 7.1 (>) 3.5 - 5.2 mmol/L    Chloride 107 (H) 96 - 106 mmol/L    CO2 19 18 - 29 mmol/L    Calcium 9.6 8.6 - 10.2 mg/dL    Protein, total 7.6 6.0 - 8.5 g/dL    Albumin 4.5 3.6 - 4.8 g/dL    GLOBULIN, TOTAL 3.1 1.5 - 4.5 g/dL    A-G Ratio 1.5 1.2 - 2.2    Bilirubin, total 0.2 0.0 - 1.2 mg/dL    Alk. phosphatase 85 39 - 117 IU/L    AST (SGOT) 29 0 - 40 IU/L    ALT (SGPT) 27 0 - 44 IU/L   LIPID PANEL    Collection Time: 03/16/18  9:28 AM   Result Value Ref Range    Cholesterol, total 184 100 - 199 mg/dL    Triglyceride 131 0 - 149 mg/dL    HDL Cholesterol 47 >39 mg/dL    VLDL, calculated 26 5 - 40 mg/dL    LDL, calculated 111 (H) 0 - 99 mg/dL   HEMOGLOBIN A1C WITH EAG    Collection Time: 03/16/18  9:28 AM   Result Value Ref Range    Hemoglobin A1c 7.0 (H) 4.8 - 5.6 %    Estimated average glucose 154 mg/dL   CVD REPORT    Collection Time: 03/16/18  9:28 AM   Result Value Ref Range    INTERPRETATION Note    CBC WITH AUTOMATED DIFF    Collection Time: 03/17/18  5:40 AM   Result Value Ref Range    WBC 6.2 4.1 - 11.1 K/uL    RBC 4.27 4. 10 - 5.70 M/uL    HGB 10.3 (L) 12.1 - 17.0 g/dL    HCT 34.8 (L) 36.6 - 50.3 %    MCV 81.5 80.0 - 99.0 FL    MCH 24.1 (L) 26.0 - 34.0 PG    MCHC 29.6 (L) 30.0 - 36.5 g/dL    RDW 16.2 (H) 11.5 - 14.5 %    PLATELET 846 813 - 359 K/uL    MPV 11.2 8.9 - 12.9 FL    NRBC 0.0 0  WBC    ABSOLUTE NRBC 0.00 0.00 - 0.01 K/uL    NEUTROPHILS 63 32 - 75 %    LYMPHOCYTES 21 12 - 49 %    MONOCYTES 10 5 - 13 %    EOSINOPHILS 4 0 - 7 %    BASOPHILS 1 0 - 1 %    IMMATURE GRANULOCYTES 0 0.0 - 0.5 %    ABS. NEUTROPHILS 3.9 1.8 - 8.0 K/UL    ABS. LYMPHOCYTES 1.3 0.8 - 3.5 K/UL    ABS. MONOCYTES 0.6 0.0 - 1.0 K/UL    ABS. EOSINOPHILS 0.3 0.0 - 0.4 K/UL    ABS. BASOPHILS 0.1 0.0 - 0.1 K/UL    ABS. IMM.  GRANS. 0.0 0.00 - 0.04 K/UL    DF AUTOMATED     METABOLIC PANEL, COMPREHENSIVE    Collection Time: 03/17/18  5:40 AM   Result Value Ref Range    Sodium 138 136 - 145 mmol/L    Potassium 5.1 3.5 - 5.1 mmol/L    Chloride 109 (H) 97 - 108 mmol/L    CO2 23 21 - 32 mmol/L    Anion gap 6 5 - 15 mmol/L    Glucose 136 (H) 65 - 100 mg/dL    BUN 17 6 - 20 MG/DL    Creatinine 1.12 0.70 - 1.30 MG/DL    BUN/Creatinine ratio 15 12 - 20      GFR est AA >60 >60 ml/min/1.73m2    GFR est non-AA >60 >60 ml/min/1.73m2    Calcium 9.1 8.5 - 10.1 MG/DL    Bilirubin, total 0.2 0.2 - 1.0 MG/DL    ALT (SGPT) 34 12 - 78 U/L    AST (SGOT) 25 15 - 37 U/L    Alk. phosphatase 105 45 - 117 U/L    Protein, total 8.1 6.4 - 8.2 g/dL    Albumin 4.0 3.5 - 5.0 g/dL    Globulin 4.1 (H) 2.0 - 4.0 g/dL    A-G Ratio 1.0 (L) 1.1 - 2.2     EKG, 12 LEAD, INITIAL    Collection Time: 03/17/18  5:53 AM   Result Value Ref Range    Ventricular Rate 86 BPM    Atrial Rate 86 BPM    P-R Interval 118 ms    QRS Duration 94 ms    Q-T Interval 346 ms    QTC Calculation (Bezet) 414 ms    Calculated P Axis 55 degrees    Calculated R Axis 39 degrees    Calculated T Axis 99 degrees    Diagnosis       Normal sinus rhythm  Nonspecific T wave abnormality  When compared with ECG of 11-NOV-2016 14:49,  No significant change was found         No results found.

## 2018-03-17 NOTE — ED PROVIDER NOTES
HPI Comments: 74yo M here for hyperkalemia.  pmh of DM and cad. Had routine physical and blood tests drawn yesterday. Received a phone call from PCP at 04:30 that his potassium was 7.1. Pt has no complaints. Never had high potassium in past.  No h/o kidney disease. Denies any recent vomiting, diarrhea. No recent medication changes. Patient is a 78 y.o. male presenting with referral/consult. The history is provided by the patient and the spouse. Referral / Consult   Pertinent negatives include no chest pain, no abdominal pain and no headaches. Past Medical History:   Diagnosis Date    CAD (coronary artery disease) 11/10/2016    NSTEMI & 2 stents    Deafness 10/28/2012    DM [facility]di[facility]     Elevated cholesterol     Hypertension     NSTEMI (non-ST elevated myocardial infarction) ([facility] 11/10/2016       Past Surgical History:   Procedure Laterality Date    CARDIAC SURG PROCEDURE UNLIST  11/11/2016    2 stents    HX APPENDECTOMY           Family History:   Problem Relation Age of Onset    Heart Disease Father     Hypertension Mother    [facility] Elevated Lipids Brother     Elevated Lipids Brother        Social History     Social History    Marital status:      Spouse name: N/A    Number of children: N/A    Years of education: N/A     Occupational History    Not on file. Social History Main Topics    Smoking status: Never Smoker    Smokeless tobacco: Never Used    Alcohol use 1.2 oz/week     1 Cans of beer, 1 Shots of liquor per week    Drug use: No    Sexual activity: Not on file     Other Topics Concern    Not on file     Social History Narrative         ALLERGIES: Review of patient's allergies indicates no known allergies. Review of Systems   Constitutional: Negative for diaphoresis and fever. HENT: Negative for facial swelling. Eyes: Negative for visual disturbance. Respiratory: Negative for cough. Cardiovascular: Negative for chest pain. Gastrointestinal: Negative for abdominal pain. Genitourinary: Negative for dysuria. Musculoskeletal: Negative for joint swelling. Skin: Negative for rash. Neurological: Negative for headaches. Hematological: Negative for adenopathy. Psychiatric/Behavioral: Negative for suicidal ideas. Vitals:    03/17/18 0530   BP: 184/82   Pulse: 99   Resp: 16   Temp: 97.7 °F (36.5 °C)   SpO2: 98%   Weight: 64.9 kg (143 lb)   Height: 5' 5\" (1.651 m)            Physical Exam   Constitutional: He is oriented to person, place, and time. He appears well-developed and well-nourished. No distress. HENT:   Head: Normocephalic and atraumatic. Mouth/Throat: Oropharynx is clear and moist.   Eyes: Pupils are equal, round, and reactive to light. Neck: Normal range of motion. Neck supple. Cardiovascular: Normal rate, regular rhythm, normal heart sounds and intact distal pulses. Pulmonary/Chest: Effort normal and breath sounds normal. No respiratory distress. Abdominal: Soft. Bowel sounds are normal. He exhibits no distension. There is no tenderness. Musculoskeletal: Normal range of motion. He exhibits no edema. Neurological: He is alert and oriented to person, place, and time. Skin: Skin is warm and dry. Nursing note and vitals reviewed. MDM  Number of Diagnoses or Management Options  Diagnosis management comments: A:  Possible hyperkalemia vs hemolyzed sample. Will recheck and obtain ECG. ED Course       Procedures    ,ED EKG interpretation:  Rhythm: normal sinus rhythm. Rate (approx.): 86. Axis: normal.  ST segment:  No concerning ST elevations or depressions. This EKG was interpreted by Adam Yao MD,ED Provider. K=5.1    6:15 AM  Patient resting comfortably with no complaints at this time. VS remain stable. Repeat physical exam is unremarkable. Pt ambulatory without difficulty and tolerating po's well. Stable for discharge home.

## 2018-03-18 NOTE — PROGRESS NOTES
Bandar Roman,  I have reviewed your results  Kidney function showed high Potassium but I have reviewed your ER visit and results. Repeat Potassium was normal.other kidney results normal  Urine shows more protein now and need to keep sugar and blood pressure under better control  Sugar is stable at 7.0. Due to your cardia history, this level is ok and acceptable  Cholesterol results show elevated LDL, 111, goal < 70. Make sure you watch diet strictly and take Pravastatin regularly.   thanks

## 2018-03-19 ENCOUNTER — PATIENT OUTREACH (OUTPATIENT)
Dept: FAMILY MEDICINE CLINIC | Age: 68
End: 2018-03-19

## 2018-03-19 NOTE — PROGRESS NOTES
Patient listed on [facility] ED discharge list following a visit to the Ed on 3/17/17 for Hyperkalemia. Patient was seen at his PCP office and was told  K=7.1. He was seen in the ED for a re-check. Patient seen in the Ed with the following K-results:  3/17/2018  6:11 AM - Jermaine, [facility] [employer]   Component Results   Component Value Flag Ref Range Units Status   Sodium 138  136 - 145 mmol/L Final   Potassium 5.1  3.5 - 5.1 mmol/L Final   Chloride 109 (H) 97 - 108 mmol/L Final   CO2 23  21 - 32 mmol/L Final   Anion gap 6  5 - 15 mmol/L Final   Glucose 136 (H) 65 - 100 mg/dL Final   BUN 17  6 - 20 MG/DL Final   Creatinine 1.12  0.70 - 1.30 MG/DL Final   BUN/Creatinine ratio 15  12 - 20   Final   GFR est AA >60  >60 ml/min/1.73m2 Final   GFR est non-AA >60  >60 ml/min/1.73m2 Final     Vitals:     03/17/18 0530   BP: 184/82   Pulse: 99   Resp: 16   Temp: 97.7 °F (36.5 °C)   SpO2: 98%   Weight: 64.9 kg (143 lb)   Height: 5' 5\" (1.651 m)       . Current Outpatient Prescriptions   Medication Sig    losartan (COZAAR) 100 mg tablet     hydroCHLOROthiazide (HYDRODIURIL) 25 mg tablet Take 25 mg by mouth daily (after breakfast).  JANUVIA 50 mg tablet TAKE ONE TABLET BY MOUTH ONCE DAILY    clopidogrel (PLAVIX) 75 mg tab Take 1 Tab by mouth daily.  lancets (ONE TOUCH DELICA) 33 gauge misc Check sugar once daily    nitroglycerin (NITROSTAT) 0.4 mg SL tablet     glipiZIDE (GLUCOTROL) 10 mg tablet Take 1 Tab by mouth two (2) times a day.  metFORMIN (GLUCOPHAGE) 1,000 mg tablet Take 1 Tab by mouth two (2) times daily (with meals).  losartan-hydroCHLOROthiazide (HYZAAR) 50-12.5 mg per tablet Take 1 Tab by mouth daily.  pravastatin (PRAVACHOL) 40 mg tablet Take 1 Tab by mouth nightly.  glucose blood VI test strips (ONETOUCH VERIO) strip Check sugar once daily    therapeutic multivitamin (THERAGRAN) tablet Take 1 Tab by mouth daily.  aspirin 81 mg tablet Take 81 mg by mouth daily.      No current facility-administered medications for this visit. Diagnosis management comments: A:  Possible hyperkalemia vs hemolyzed sample. Will recheck and obtain ECG. Patient discharged home. Future Appointments      Provider Mina Delgado   5/3/2018 8:20 AM Lokesh Marie MD [address]   6/18/2018 9:00 AM MARY ANN BARILLAS [facility] [place] [address] [address]   6/18/2018 10:00 AM Carlo Diallo MD [facility] [place] [address] [address]   6/18/2018 10:00 AM LIDA, REESE [facility] [place] [address]   7/16/2018 8:40 AM Lokesh Marie MD [facility]      Case management Plan:  NN will continue to attempt contacts with patient by telephone or during office visit within next 7-10 days. Will continue to follow as necessary for the remaining 30 days post visit and will reassess to see if CCM assessment is needed before discharge.

## 2018-03-20 ENCOUNTER — PATIENT MESSAGE (OUTPATIENT)
Dept: FAMILY MEDICINE CLINIC | Age: 68
End: 2018-03-20

## 2018-03-21 NOTE — TELEPHONE ENCOUNTER
From: Long Kendall  To:  Chad Hurt MD  Sent: 3/20/2018 5:52 PM EDT  Subject: Non-Urgent Medical Question    Hi Dr. Yoseph Randall   this is the list of medications that you requested   metformin 1000mg  clopidgrel 75mg  losatran 10mg  pravastatin 40mg  hydrochlorot 25mg  januvia 50mg  thanks   Long Quiñones

## 2018-05-11 RX ORDER — GLIPIZIDE 10 MG/1
TABLET ORAL
Qty: 180 TAB | Refills: 2 | Status: ON HOLD | OUTPATIENT
Start: 2018-05-11 | End: 2018-07-18

## 2018-05-21 RX ORDER — METFORMIN HYDROCHLORIDE 1000 MG/1
TABLET ORAL
Qty: 180 TAB | Refills: 2 | Status: SHIPPED | OUTPATIENT
Start: 2018-05-21 | End: 2019-03-01 | Stop reason: SDUPTHER

## 2018-06-18 ENCOUNTER — TELEPHONE (OUTPATIENT)
Dept: CARDIOLOGY CLINIC | Age: 68
End: 2018-06-18

## 2018-06-18 ENCOUNTER — CLINICAL SUPPORT (OUTPATIENT)
Dept: CARDIOLOGY CLINIC | Age: 68
End: 2018-06-18

## 2018-06-18 ENCOUNTER — OFFICE VISIT (OUTPATIENT)
Dept: CARDIOLOGY CLINIC | Age: 68
End: 2018-06-18

## 2018-06-18 VITALS
DIASTOLIC BLOOD PRESSURE: 62 MMHG | SYSTOLIC BLOOD PRESSURE: 140 MMHG | HEIGHT: 65 IN | OXYGEN SATURATION: 98 % | BODY MASS INDEX: 23.86 KG/M2 | HEART RATE: 109 BPM | WEIGHT: 143.2 LBS | RESPIRATION RATE: 16 BRPM

## 2018-06-18 DIAGNOSIS — E11.65 TYPE 2 DIABETES MELLITUS WITH HYPERGLYCEMIA, WITHOUT LONG-TERM CURRENT USE OF INSULIN (HCC): ICD-10-CM

## 2018-06-18 DIAGNOSIS — Z01.818 PREOP TESTING: ICD-10-CM

## 2018-06-18 DIAGNOSIS — Z78.9 STATIN INTOLERANCE: ICD-10-CM

## 2018-06-18 DIAGNOSIS — E78.00 HYPERCHOLESTEREMIA: ICD-10-CM

## 2018-06-18 DIAGNOSIS — R94.39 ABNORMAL STRESS ECHOCARDIOGRAM: ICD-10-CM

## 2018-06-18 DIAGNOSIS — I25.10 CORONARY ARTERY DISEASE INVOLVING NATIVE CORONARY ARTERY OF NATIVE HEART WITHOUT ANGINA PECTORIS: Primary | ICD-10-CM

## 2018-06-18 DIAGNOSIS — I25.10 CAD, MULTIPLE VESSEL: ICD-10-CM

## 2018-06-18 DIAGNOSIS — I10 HYPERTENSION, ESSENTIAL: ICD-10-CM

## 2018-06-18 DIAGNOSIS — I35.0 NONRHEUMATIC AORTIC VALVE STENOSIS: ICD-10-CM

## 2018-06-18 RX ORDER — SODIUM CHLORIDE 0.9 % (FLUSH) 0.9 %
5-10 SYRINGE (ML) INJECTION EVERY 8 HOURS
Status: CANCELLED | OUTPATIENT
Start: 2018-06-22

## 2018-06-18 RX ORDER — SODIUM CHLORIDE 0.9 % (FLUSH) 0.9 %
5-10 SYRINGE (ML) INJECTION AS NEEDED
Status: CANCELLED | OUTPATIENT
Start: 2018-06-22

## 2018-06-18 RX ORDER — SODIUM CHLORIDE 9 MG/ML
75 INJECTION, SOLUTION INTRAVENOUS CONTINUOUS
Status: CANCELLED | OUTPATIENT
Start: 2018-06-22 | End: 2018-06-22

## 2018-06-18 RX ORDER — DIPHENHYDRAMINE HYDROCHLORIDE 50 MG/ML
25 INJECTION, SOLUTION INTRAMUSCULAR; INTRAVENOUS
Status: CANCELLED | OUTPATIENT
Start: 2018-06-22 | End: 2018-06-23

## 2018-06-18 NOTE — PATIENT INSTRUCTIONS
Your Cardiac Catheterization procedure has been scheduled for (nurse to call with time), at [address].    Please report to Admitting Department 2 hours prior to your scheduled procedure. Please bring a list of your current medications and medication bottles, if able, to the hospital on this day. You will be unable to drive after your procedure so please make sure to bring someone with you to your procedure. You will need to have nothing to eat or drink after midnight, the night prior to your procedure. You may have small sips of water, if needed, to take with your medication. You will need labs drawn prior to your procedure. Please go to [facility] to have this done the week of your procedure. You should stop your medication, metformin the day before, the day of and 2 days following your scheduled procedure. You should not take glipizide the day of your procedure.

## 2018-06-18 NOTE — PROGRESS NOTES
Neal Kendall     1950       Monik Law MD, [facility] [place] [facility]  Date of Visit-6/18/2018   PCP is Adilson Gupta MD   [facility]  [facility]  HPI:  Mark Qureshi is a 78 y.o. male   Follow up of CAD with NSTEMI in November 2016. Overall the pt states he is doing well. He denies any lightheadedness or dizziness. The pt states that prior to his heart attack he was feeling very fatigued. He denies feeling these symptoms at this time. The pt states that he was asymptomatic during his stress test today and felt well. He reports that he is walking only about 10 minutes and then has to stop due to hip pain. The pt has discussed this with Adilson Gupta MD and he needs to talk with an orthopedist.     Denies chest pain, edema, syncope or shortness of breath at rest, has no tachycardia, palpitations or sense of arrhythmia. Assessment/Plan:     1. CAD, NSTEMI November 2016. Had a marginal increase in troponin but significant disease in LAD and OM2 with JEREMY. Is limited in exercise by hip pain and     ---stress echo today is markedly abnormal by EKG and imaging. It is suspicious for multi vessel disease with a global hypokinesia at peak stress. Pt continues plavix, ASA. He is intolerant to BB due to fatigue. 2. His risk factors for recurrent disease include statin intolerance and diabetes. 3. Dyslipidemia statin intolerant on pravastatin. 4. Pulmonary hypertension with DONALDSON improved. 5. DM2, on oral meds. 6. I have suggested that he undergo heart cath and he is going to look at the next 2 Friday's and schedule.    risks and benefits discussed  2/1000 serious life threatening risk includes stroke, heart attack leading to  death  1/100 prolong hospital stay above and bleeding, groin complications, infection, renals possible but  unlikely unless baseline renal dysfunction, tear in cardiac vessel, tamponade  PCI 8-6/376 similar complications but benefits likely outweigh risk         Key CAD CHF Meds             hydroCHLOROthiazide (HYDRODIURIL) 25 mg tablet  (Taking) Take 25 mg by mouth daily (after breakfast). losartan (COZAAR) 100 mg tablet  (Taking/Discontinued)     clopidogrel (PLAVIX) 75 mg tab  (Taking/Discontinued) Take 1 Tab by mouth daily. nitroglycerin (NITROSTAT) 0.4 mg SL tablet  (Taking)     pravastatin (PRAVACHOL) 40 mg tablet  (Taking/Discontinued) Take 1 Tab by mouth nightly. aspirin 81 mg tablet  (Taking) Take 81 mg by mouth daily. Impression:   1. Coronary artery disease involving native coronary artery of native heart without angina pectoris    2. Abnormal stress echocardiogram    3. Hypercholesteremia    4. Nonrheumatic aortic valve stenosis    5. Hypertension, essential    6. Statin intolerance    7. Type 2 diabetes mellitus with hyperglycemia, without long-term current use of insulin (HCC)    8. Preop testing         ROS-except as noted above. . A complete cardiac and respiratory are reviewed and negative except as above ; Resp-denies wheezing  or productive cough,. Const- No unusual weight loss or fever; Neuro-no recent seizure or CVA ; GI- No BRBPR, abdom pain, bloating ; - no  hematuria   see supplement sheet, initialed and to be scanned by staff  Past Medical History:   Diagnosis Date    CAD (coronary artery disease) 11/10/2016    NSTEMI & 2 stents    Deafness 10/28/2012    [facility]di[facility]     Elevated cholesterol     Hypertension     NSTEMI (non-ST elevated myocardial infarction) ([facility] 11/10/2016      Social Hx= reports that he has never smoked. He has never used smokeless tobacco. He reports that he drinks about 1.2 oz of alcohol per week  He reports that he does not use illicit drugs.      Exam and Labs:  /62 (BP 1 Location: Left arm, BP Patient Position: Sitting)  Pulse (!) 109  Resp 16  Ht 5' 5\" (1.651 m)  Wt 143 lb 3.2 oz (65 kg)  SpO2 98%  BMI 23.83 kg/l5Tegwxlqktmymby: NAD, comfortable  Head: NC,AT. Eyes: No scleral icterus. Neck:  Neck supple. No JVD present. Throat: moist mucous membranes. Chest: Effort normal & normal respiratory excursion . Neurological: alert, conversant and oriented . Skin: Skin is not cold. No obvious systemic rash noted. Not diaphoretic. No erythema. Psychiatric:  Grossly normal mood and affect. Behavior appears normal. Extremities:  no clubbing or cyanosis. Abdomen: non distended    Lungs:breath sounds normal. No stridor. distress, wheezes or  Rales. GYCEE:7-9/9 systolic ejection murmur normal rate, regular rhythm, normal S1, S2, no rubs, clicks or gallops , PMI non displaced. Edema: Edema is none.   Lab Results   Component Value Date/Time    Cholesterol, total 184 03/16/2018 09:28 AM    HDL Cholesterol 47 03/16/2018 09:28 AM    LDL, calculated 111 (H) 03/16/2018 09:28 AM    Triglyceride 131 03/16/2018 09:28 AM     Lab Results   Component Value Date/Time    Sodium 138 03/17/2018 05:40 AM    Potassium 5.1 03/17/2018 05:40 AM    Chloride 109 (H) 03/17/2018 05:40 AM    CO2 23 03/17/2018 05:40 AM    Anion gap 6 03/17/2018 05:40 AM    Glucose 136 (H) 03/17/2018 05:40 AM    BUN 17 03/17/2018 05:40 AM    Creatinine 1.12 03/17/2018 05:40 AM    BUN/Creatinine ratio 15 03/17/2018 05:40 AM    GFR est AA >60 03/17/2018 05:40 AM    GFR est non-AA >60 03/17/2018 05:40 AM    Calcium 9.1 03/17/2018 05:40 AM      Wt Readings from Last 3 Encounters:   03/17/18 143 lb (64.9 kg)   03/16/18 143 lb 12.8 oz (65.2 kg)   12/14/17 146 lb (66.2 kg)      BP Readings from Last 3 Encounters:   03/17/18 163/74   03/16/18 118/56   12/14/17 140/70      Current Outpatient Prescriptions   Medication Sig    metFORMIN (GLUCOPHAGE) 1,000 mg tablet TAKE ONE TABLET BY MOUTH TWICE DAILY WITH MEALS    glipiZIDE (GLUCOTROL) 10 mg tablet TAKE ONE TABLET BY MOUTH TWICE DAILY    losartan (COZAAR) 100 mg tablet     hydroCHLOROthiazide (HYDRODIURIL) 25 mg tablet Take 25 mg by mouth daily (after breakfast).  JANUVIA 50 mg tablet TAKE ONE TABLET BY MOUTH ONCE DAILY    clopidogrel (PLAVIX) 75 mg tab Take 1 Tab by mouth daily.  lancets (ONE TOUCH DELICA) 33 gauge misc Check sugar once daily    nitroglycerin (NITROSTAT) 0.4 mg SL tablet     pravastatin (PRAVACHOL) 40 mg tablet Take 1 Tab by mouth nightly.  glucose blood VI test strips (ONETOUCH VERIO) strip Check sugar once daily    therapeutic multivitamin (THERAGRAN) tablet Take 1 Tab by mouth daily.  aspirin 81 mg tablet Take 81 mg by mouth daily. No current facility-administered medications for this visit. Impression see above.       Written by Yoselin Perez, as dictated by Valentin Yoon MD.

## 2018-06-18 NOTE — TELEPHONE ENCOUNTER
Verified patient with two types of identifiers. Spoke with pt's wife, Venus Lakhani. Informed wife per pt's request that cath is scheduled for 6/22/18 @ 8:15 am. Jacquelynne Schaumann understanding. Wife questioned why cath was being performed? Wife informed that Dr. Amee Munoz would be out of the office until Thursday.  Will ask MD and return call

## 2018-06-18 NOTE — MR AVS SNAPSHOT
[address] [facility] [address] [place] [address] 
[phone] Patient: Jair Sierra MRN: [id number] BIM:[id number][id number] Visit Information Date & Time Provider Department Dept. Phone Encounter #  
 6/18/2018 10:00 AM Nery King MD [facility] [phone] [id number] Your Appointments 7/16/2018  8:40 AM  
Medicare Physical with Mary Montesinos MD  
[facility]) Appt Note: 4 month fasting, medicare wellness physical  
 [address] [address]  
[phone]  
  
   
 [address] [address] Upcoming Health Maintenance Date Due DTaP/Tdap/Td series (1 - Tdap) 10/5/1972 FOBT Q 1 YEAR AGE 50-75 10/5/2001 ZOSTER VACCINE AGE 60> 8/5/2011 Pneumococcal 65+ Low/Medium Risk (1 of 2 - PCV13) 10/5/2016 EYE EXAM RETINAL OR DILATED Q1 12/6/2017 MEDICARE YEARLY EXAM 3/14/2018 Influenza Age 5 to Adult 8/1/2018 HEMOGLOBIN A1C Q6M 9/16/2018 GLAUCOMA SCREENING Q2Y 12/6/2018 FOOT EXAM Q1 3/16/2019 MICROALBUMIN Q1 3/16/2019 LIPID PANEL Q1 3/16/2019 Allergies as of 6/18/2018  Review Complete On: 6/18/2018 By: Alfa Reed No Known Allergies Current Immunizations  Reviewed on 11/8/2017 Name Date Influenza Nasal Vaccine 11/7/2017 Influenza Vaccine 10/20/2015 10:00 AM, 1/27/2015  9:30 AM, 10/22/2013 Influenza Vaccine (Quad) PF 11/12/2016 10:10 AM  
  
 Not reviewed this visit You Were Diagnosed With   
  
 Codes Comments Preop testing    -  Primary ICD-10-CM: I35.501 ICD-9-CM: V72.84 Vitals BP Pulse Resp Height(growth percentile) Weight(growth percentile) SpO2  
 140/62 (BP 1 Location: Left arm, BP Patient Position: Sitting) (!) 109 16 5' 5\" (1.651 m) 143 lb 3.2 oz (65 kg) 98% BMI Smoking Status 23.83 kg/m2 Never Smoker BMI and BSA Data Body Mass Index Body Surface Area  
 23.83 kg/m 2 1.73 m 2 Preferred Pharmacy Pharmacy Name Phone [facility] [phone] Your Updated Medication List  
  
   
This list is accurate as of 6/18/18 11:43 AM.  Always use your most recent med list.  
  
  
  
  
 aspirin 81 mg tablet Take 81 mg by mouth daily. clopidogrel 75 mg Tab Commonly known as:  PLAVIX Take 1 Tab by mouth daily. glipiZIDE 10 mg tablet Commonly known as:  GLUCOTROL  
TAKE ONE TABLET BY MOUTH TWICE DAILY  
  
 glucose blood VI test strips strip Commonly known as:  Brittney Hearn Check sugar once daily  
  
 hydroCHLOROthiazide 25 mg tablet Commonly known as:  HYDRODIURIL Take 25 mg by mouth daily (after breakfast). JANUVIA 50 mg tablet Generic drug:  SITagliptin TAKE ONE TABLET BY MOUTH ONCE DAILY  
  
 lancets 33 gauge Misc Commonly known as: One Touch Levell Knows Check sugar once daily  
  
 losartan 100 mg tablet Commonly known as:  COZAAR  
  
 metFORMIN 1,000 mg tablet Commonly known as:  GLUCOPHAGE  
TAKE ONE TABLET BY MOUTH TWICE DAILY WITH MEALS  
  
 nitroglycerin 0.4 mg SL tablet Commonly known as:  NITROSTAT  
  
 pravastatin 40 mg tablet Commonly known as:  PRAVACHOL Take 1 Tab by mouth nightly. therapeutic multivitamin tablet Commonly known as:  [facility] Take 1 Tab by mouth daily. We Performed the Following CBC WITH AUTOMATED DIFF [36538 CPT(R)] METABOLIC PANEL, BASIC [22555 CPT(R)] To-Do List   
 06/22/2018 1:15 PM  
  Appointment with [facility] ROOM 1 [facility] [address] ([phone]) NPO AFTER MIDNIGHT! ROUTINE CASES:  Please arrive 2 hours prior to your scheduled appointment time. If your scheduled appointment is for 7:30am, 8:00am or 8:15am, please arrive by 6:45am.  NON ROUTINE CASES:  1.   If you require labs, x-ray, EKG or meds-please arrive 3 hours prior to your appointment time. 2.  If you require hydration prior to your procedure-please arrive 4 hours prior to your appointment time. ** It is the [employer] responsibility to notify the Cath Lab  of any prep required outside of the normal routine case** Patient Instructions Your Cardiac Catheterization procedure has been scheduled for (nurse to call with time), at [facility]. 
 
Please report to Admitting Department 2 hours prior to your scheduled procedure. Please bring a list of your current medications and medication bottles, if able, to the hospital on this day. You will be unable to drive after your procedure so please make sure to bring someone with you to your procedure. You will need to have nothing to eat or drink after midnight, the night prior to your procedure. You may have small sips of water, if needed, to take with your medication. You will need labs drawn prior to your procedure. Please go to [facility] to have this done the week of your procedure. You should stop your medication, metformin the day before, the day of and 2 days following your scheduled procedure. You should not take glipizide the day of your procedure. Introducing [facility]! Dear Haja Perez: Thank you for requesting a [employer] account. Our records indicate that you have previously registered for a [employer] account but its currently inactive. Please call our [employer] support line at [phone]. Additional Information If you have questions, please visit the Frequently Asked Questions section of the [employer] website at [email or web address] Remember, [employer] is NOT to be used for urgent needs. For medical emergencies, dial 911. Now available from your iPhone and Android! Please provide this summary of care documentation to your next provider. Your primary care clinician is listed as Claudia Gaines. If you have any questions after today's visit, please call [phone].

## 2018-06-20 LAB
BASOPHILS # BLD AUTO: 0.1 X10E3/UL (ref 0–0.2)
BASOPHILS NFR BLD AUTO: 1 %
BUN SERPL-MCNC: 17 MG/DL (ref 8–27)
BUN/CREAT SERPL: 14 (ref 10–24)
CALCIUM SERPL-MCNC: 9.5 MG/DL (ref 8.6–10.2)
CHLORIDE SERPL-SCNC: 104 MMOL/L (ref 96–106)
CO2 SERPL-SCNC: 19 MMOL/L (ref 20–29)
CREAT SERPL-MCNC: 1.2 MG/DL (ref 0.76–1.27)
EOSINOPHIL # BLD AUTO: 0.2 X10E3/UL (ref 0–0.4)
EOSINOPHIL NFR BLD AUTO: 3 %
ERYTHROCYTE [DISTWIDTH] IN BLOOD BY AUTOMATED COUNT: 16.8 % (ref 12.3–15.4)
GFR SERPLBLD CREATININE-BSD FMLA CKD-EPI: 63 ML/MIN/1.73
GFR SERPLBLD CREATININE-BSD FMLA CKD-EPI: 72 ML/MIN/1.73
GLUCOSE SERPL-MCNC: 104 MG/DL (ref 65–99)
HCT VFR BLD AUTO: 31.8 % (ref 37.5–51)
HGB BLD-MCNC: 9.3 G/DL (ref 13–17.7)
IMM GRANULOCYTES # BLD: 0 X10E3/UL (ref 0–0.1)
IMM GRANULOCYTES NFR BLD: 0 %
LYMPHOCYTES # BLD AUTO: 1.5 X10E3/UL (ref 0.7–3.1)
LYMPHOCYTES NFR BLD AUTO: 26 %
MCH RBC QN AUTO: 22.4 PG (ref 26.6–33)
MCHC RBC AUTO-ENTMCNC: 29.2 G/DL (ref 31.5–35.7)
MCV RBC AUTO: 77 FL (ref 79–97)
MONOCYTES # BLD AUTO: 0.4 X10E3/UL (ref 0.1–0.9)
MONOCYTES NFR BLD AUTO: 7 %
NEUTROPHILS # BLD AUTO: 3.4 X10E3/UL (ref 1.4–7)
NEUTROPHILS NFR BLD AUTO: 63 %
PLATELET # BLD AUTO: 257 X10E3/UL (ref 150–379)
POTASSIUM SERPL-SCNC: 5.4 MMOL/L (ref 3.5–5.2)
RBC # BLD AUTO: 4.15 X10E6/UL (ref 4.14–5.8)
SODIUM SERPL-SCNC: 138 MMOL/L (ref 134–144)
WBC # BLD AUTO: 5.6 X10E3/UL (ref 3.4–10.8)

## 2018-06-20 NOTE — TELEPHONE ENCOUNTER
Verified patient with two types of identifiers. Wife called wanting to verify medications to hold. Questions answered. Verbalizes understanding. And will call with any questions or concerns.

## 2018-06-21 RX ORDER — HYDROCHLOROTHIAZIDE 25 MG/1
TABLET ORAL
Qty: 30 TAB | Refills: 5 | Status: ON HOLD | OUTPATIENT
Start: 2018-06-21 | End: 2018-06-22

## 2018-06-21 NOTE — TELEPHONE ENCOUNTER
Request for hydrochlorothiazide 25 mg, daily. Last office visit 6/18/18,   Next office visit none scheduled.      Refills per verbal order from Dr. Gregory Stephenson.

## 2018-06-21 NOTE — TELEPHONE ENCOUNTER
Called Home MARBIN , JOE  Called [employer] [facility]  Wife is on PHI  Await their call back or will discuss in am with them  Cath at 815  Abnormal stress echo with new wall motion abnormalities at stress and global stress hypokinesis worrisome for multivessel disease

## 2018-06-22 ENCOUNTER — HOSPITAL ENCOUNTER (INPATIENT)
Dept: CARDIAC CATH/INVASIVE PROCEDURES | Age: 68
LOS: 3 days | Discharge: HOME | DRG: 287 | End: 2018-06-25
Attending: SPECIALIST | Admitting: SPECIALIST
Payer: MEDICARE

## 2018-06-22 ENCOUNTER — APPOINTMENT (OUTPATIENT)
Dept: GENERAL RADIOLOGY | Age: 68
DRG: 287 | End: 2018-06-22
Attending: PHYSICIAN ASSISTANT
Payer: MEDICARE

## 2018-06-22 PROBLEM — I21.4 NSTEMI (NON-ST ELEVATED MYOCARDIAL INFARCTION) (HCC): Status: ACTIVE | Noted: 2018-06-22

## 2018-06-22 PROBLEM — I25.10 CAD, MULTIPLE VESSEL: Status: ACTIVE | Noted: 2018-06-22

## 2018-06-22 LAB
ALBUMIN SERPL-MCNC: 4 G/DL (ref 3.5–5)
ALBUMIN/GLOB SERPL: 1.1 {RATIO} (ref 1.1–2.2)
ALP SERPL-CCNC: 98 U/L (ref 45–117)
ALT SERPL-CCNC: 40 U/L (ref 12–78)
ANION GAP SERPL CALC-SCNC: 7 MMOL/L (ref 5–15)
ARTERIAL PATENCY WRIST A: YES
AST SERPL-CCNC: 29 U/L (ref 15–37)
ATRIAL RATE: 89 BPM
BASE DEFICIT BLD-SCNC: 3 MMOL/L
BASOPHILS # BLD: 0.1 K/UL (ref 0–0.1)
BASOPHILS NFR BLD: 1 % (ref 0–1)
BDY SITE: ABNORMAL
BILIRUB SERPL-MCNC: 0.4 MG/DL (ref 0.2–1)
BUN SERPL-MCNC: 13 MG/DL (ref 6–20)
BUN/CREAT SERPL: 14 (ref 12–20)
CALCIUM SERPL-MCNC: 8.8 MG/DL (ref 8.5–10.1)
CALCULATED P AXIS, ECG09: 46 DEGREES
CALCULATED R AXIS, ECG10: 42 DEGREES
CALCULATED T AXIS, ECG11: 73 DEGREES
CHLORIDE SERPL-SCNC: 107 MMOL/L (ref 97–108)
CHOLEST SERPL-MCNC: 171 MG/DL
CO2 SERPL-SCNC: 21 MMOL/L (ref 21–32)
CREAT SERPL-MCNC: 0.91 MG/DL (ref 0.7–1.3)
DIAGNOSIS, 93000: NORMAL
DIFFERENTIAL METHOD BLD: ABNORMAL
EOSINOPHIL # BLD: 0.2 K/UL (ref 0–0.4)
EOSINOPHIL NFR BLD: 2 % (ref 0–7)
ERYTHROCYTE [DISTWIDTH] IN BLOOD BY AUTOMATED COUNT: 16.5 % (ref 11.5–14.5)
ERYTHROCYTE [DISTWIDTH] IN BLOOD BY AUTOMATED COUNT: 16.5 % (ref 11.5–14.5)
EST. AVERAGE GLUCOSE BLD GHB EST-MCNC: 166 MG/DL
GAS FLOW.O2 O2 DELIVERY SYS: ABNORMAL L/MIN
GLOBULIN SER CALC-MCNC: 3.7 G/DL (ref 2–4)
GLUCOSE BLD STRIP.AUTO-MCNC: 190 MG/DL (ref 65–100)
GLUCOSE BLD STRIP.AUTO-MCNC: 201 MG/DL (ref 65–100)
GLUCOSE BLD STRIP.AUTO-MCNC: 203 MG/DL (ref 65–100)
GLUCOSE BLD STRIP.AUTO-MCNC: 210 MG/DL (ref 65–100)
GLUCOSE BLD STRIP.AUTO-MCNC: 258 MG/DL (ref 65–100)
GLUCOSE SERPL-MCNC: 198 MG/DL (ref 65–100)
HBA1C MFR BLD: 7.4 % (ref 4.2–6.3)
HCO3 BLD-SCNC: 21.8 MMOL/L (ref 22–26)
HCT VFR BLD AUTO: 32.9 % (ref 36.6–50.3)
HCT VFR BLD AUTO: 33 % (ref 36.6–50.3)
HDLC SERPL-MCNC: 40 MG/DL
HDLC SERPL: 4.3 {RATIO} (ref 0–5)
HGB BLD-MCNC: 9.8 G/DL (ref 12.1–17)
HGB BLD-MCNC: 9.9 G/DL (ref 12.1–17)
IMM GRANULOCYTES # BLD: 0 K/UL (ref 0–0.04)
IMM GRANULOCYTES NFR BLD AUTO: 0 % (ref 0–0.5)
IRON SATN MFR SERPL: 9 % (ref 20–50)
IRON SERPL-MCNC: 40 UG/DL (ref 35–150)
LDLC SERPL CALC-MCNC: 101 MG/DL (ref 0–100)
LIPID PROFILE,FLP: ABNORMAL
LYMPHOCYTES # BLD: 1.3 K/UL (ref 0.8–3.5)
LYMPHOCYTES NFR BLD: 17 % (ref 12–49)
MCH RBC QN AUTO: 23.2 PG (ref 26–34)
MCH RBC QN AUTO: 23.3 PG (ref 26–34)
MCHC RBC AUTO-ENTMCNC: 29.8 G/DL (ref 30–36.5)
MCHC RBC AUTO-ENTMCNC: 30 G/DL (ref 30–36.5)
MCV RBC AUTO: 77.3 FL (ref 80–99)
MCV RBC AUTO: 78.1 FL (ref 80–99)
MONOCYTES # BLD: 0.6 K/UL (ref 0–1)
MONOCYTES NFR BLD: 7 % (ref 5–13)
NEUTS SEG # BLD: 5.6 K/UL (ref 1.8–8)
NEUTS SEG NFR BLD: 73 % (ref 32–75)
NRBC # BLD: 0 K/UL (ref 0–0.01)
NRBC # BLD: 0 K/UL (ref 0–0.01)
NRBC BLD-RTO: 0 PER 100 WBC
NRBC BLD-RTO: 0 PER 100 WBC
P-R INTERVAL, ECG05: 130 MS
PCO2 BLD: 35.3 MMHG (ref 35–45)
PH BLD: 7.4 [PH] (ref 7.35–7.45)
PLATELET # BLD AUTO: 218 K/UL (ref 150–400)
PLATELET # BLD AUTO: 241 K/UL (ref 150–400)
PMV BLD AUTO: 10.8 FL (ref 8.9–12.9)
PMV BLD AUTO: 11 FL (ref 8.9–12.9)
PO2 BLD: 77 MMHG (ref 80–100)
POTASSIUM SERPL-SCNC: 5.8 MMOL/L (ref 3.5–5.1)
PROT SERPL-MCNC: 7.7 G/DL (ref 6.4–8.2)
Q-T INTERVAL, ECG07: 348 MS
QRS DURATION, ECG06: 80 MS
QTC CALCULATION (BEZET), ECG08: 423 MS
RBC # BLD AUTO: 4.21 M/UL (ref 4.1–5.7)
RBC # BLD AUTO: 4.27 M/UL (ref 4.1–5.7)
SAO2 % BLD: 95 % (ref 92–97)
SERVICE CMNT-IMP: ABNORMAL
SODIUM SERPL-SCNC: 135 MMOL/L (ref 136–145)
SPECIMEN TYPE: ABNORMAL
TIBC SERPL-MCNC: 436 UG/DL (ref 250–450)
TRIGL SERPL-MCNC: 150 MG/DL (ref ?–150)
TSH SERPL DL<=0.05 MIU/L-ACNC: 3.08 UIU/ML (ref 0.36–3.74)
VENTRICULAR RATE, ECG03: 89 BPM
VLDLC SERPL CALC-MCNC: 30 MG/DL
WBC # BLD AUTO: 7 K/UL (ref 4.1–11.1)
WBC # BLD AUTO: 7.7 K/UL (ref 4.1–11.1)

## 2018-06-22 PROCEDURE — 77030027138 HC INCENT SPIROMETER -A

## 2018-06-22 PROCEDURE — 99152 MOD SED SAME PHYS/QHP 5/>YRS: CPT

## 2018-06-22 PROCEDURE — 85025 COMPLETE CBC W/AUTO DIFF WBC: CPT | Performed by: NURSE PRACTITIONER

## 2018-06-22 PROCEDURE — 74011250636 HC RX REV CODE- 250/636

## 2018-06-22 PROCEDURE — 74011250637 HC RX REV CODE- 250/637: Performed by: PHYSICIAN ASSISTANT

## 2018-06-22 PROCEDURE — 93880 EXTRACRANIAL BILAT STUDY: CPT

## 2018-06-22 PROCEDURE — 74011000250 HC RX REV CODE- 250

## 2018-06-22 PROCEDURE — 77030013744

## 2018-06-22 PROCEDURE — B2151ZZ FLUOROSCOPY OF LEFT HEART USING LOW OSMOLAR CONTRAST: ICD-10-PCS | Performed by: SPECIALIST

## 2018-06-22 PROCEDURE — 85027 COMPLETE CBC AUTOMATED: CPT | Performed by: PHYSICIAN ASSISTANT

## 2018-06-22 PROCEDURE — 36415 COLL VENOUS BLD VENIPUNCTURE: CPT | Performed by: PHYSICIAN ASSISTANT

## 2018-06-22 PROCEDURE — 77030032490 HC SLV COMPR SCD KNE COVD -B

## 2018-06-22 PROCEDURE — 93041 RHYTHM ECG TRACING: CPT

## 2018-06-22 PROCEDURE — 93454 CORONARY ARTERY ANGIO S&I: CPT

## 2018-06-22 PROCEDURE — 80061 LIPID PANEL: CPT | Performed by: PHYSICIAN ASSISTANT

## 2018-06-22 PROCEDURE — 74011250637 HC RX REV CODE- 250/637: Performed by: SPECIALIST

## 2018-06-22 PROCEDURE — 93306 TTE W/DOPPLER COMPLETE: CPT

## 2018-06-22 PROCEDURE — 84443 ASSAY THYROID STIM HORMONE: CPT | Performed by: PHYSICIAN ASSISTANT

## 2018-06-22 PROCEDURE — 74011636637 HC RX REV CODE- 636/637: Performed by: PHYSICIAN ASSISTANT

## 2018-06-22 PROCEDURE — 4A023N7 MEASUREMENT OF CARDIAC SAMPLING AND PRESSURE, LEFT HEART, PERCUTANEOUS APPROACH: ICD-10-PCS | Performed by: SPECIALIST

## 2018-06-22 PROCEDURE — 83540 ASSAY OF IRON: CPT | Performed by: SPECIALIST

## 2018-06-22 PROCEDURE — 93922 UPR/L XTREMITY ART 2 LEVELS: CPT

## 2018-06-22 PROCEDURE — 77030013797 HC KT TRNSDUC PRSSR EDWD -A

## 2018-06-22 PROCEDURE — 65620000000 HC RM CCU GENERAL

## 2018-06-22 PROCEDURE — 82962 GLUCOSE BLOOD TEST: CPT

## 2018-06-22 PROCEDURE — 77030034850

## 2018-06-22 PROCEDURE — B2111ZZ FLUOROSCOPY OF MULTIPLE CORONARY ARTERIES USING LOW OSMOLAR CONTRAST: ICD-10-PCS | Performed by: SPECIALIST

## 2018-06-22 PROCEDURE — 74011000250 HC RX REV CODE- 250: Performed by: SPECIALIST

## 2018-06-22 PROCEDURE — C1894 INTRO/SHEATH, NON-LASER: HCPCS

## 2018-06-22 PROCEDURE — 82803 BLOOD GASES ANY COMBINATION: CPT

## 2018-06-22 PROCEDURE — 71045 X-RAY EXAM CHEST 1 VIEW: CPT

## 2018-06-22 PROCEDURE — 74011250636 HC RX REV CODE- 250/636: Performed by: SPECIALIST

## 2018-06-22 PROCEDURE — 77030004533 HC CATH ANGI DX IMP BSC -B

## 2018-06-22 PROCEDURE — 83036 HEMOGLOBIN GLYCOSYLATED A1C: CPT | Performed by: PHYSICIAN ASSISTANT

## 2018-06-22 PROCEDURE — 77030004532 HC CATH ANGI DX IMP BSC -A

## 2018-06-22 PROCEDURE — 80053 COMPREHEN METABOLIC PANEL: CPT | Performed by: PHYSICIAN ASSISTANT

## 2018-06-22 PROCEDURE — 77030011943

## 2018-06-22 PROCEDURE — 99153 MOD SED SAME PHYS/QHP EA: CPT

## 2018-06-22 PROCEDURE — 36600 WITHDRAWAL OF ARTERIAL BLOOD: CPT

## 2018-06-22 RX ORDER — GUAIFENESIN 100 MG/5ML
81 LIQUID (ML) ORAL DAILY
Status: DISCONTINUED | OUTPATIENT
Start: 2018-06-22 | End: 2018-06-25 | Stop reason: HOSPADM

## 2018-06-22 RX ORDER — BACITRACIN 500 UNIT/G
1 PACKET (EA) TOPICAL ONCE
Status: COMPLETED | OUTPATIENT
Start: 2018-06-22 | End: 2018-06-22

## 2018-06-22 RX ORDER — HEPARIN SODIUM 200 [USP'U]/100ML
2000 INJECTION, SOLUTION INTRAVENOUS AS NEEDED
Status: DISCONTINUED | OUTPATIENT
Start: 2018-06-22 | End: 2018-06-22 | Stop reason: HOSPADM

## 2018-06-22 RX ORDER — DIPHENHYDRAMINE HYDROCHLORIDE 50 MG/ML
25 INJECTION, SOLUTION INTRAMUSCULAR; INTRAVENOUS
Status: COMPLETED | OUTPATIENT
Start: 2018-06-22 | End: 2018-06-22

## 2018-06-22 RX ORDER — HYDROCHLOROTHIAZIDE 25 MG/1
25 TABLET ORAL
Status: DISCONTINUED | OUTPATIENT
Start: 2018-06-22 | End: 2018-06-24

## 2018-06-22 RX ORDER — SODIUM CHLORIDE 9 MG/ML
1.5 INJECTION, SOLUTION INTRAVENOUS CONTINUOUS
Status: DISPENSED | OUTPATIENT
Start: 2018-06-22 | End: 2018-06-22

## 2018-06-22 RX ORDER — LORAZEPAM 1 MG/1
1 TABLET ORAL
Status: DISCONTINUED | OUTPATIENT
Start: 2018-06-22 | End: 2018-06-25 | Stop reason: HOSPADM

## 2018-06-22 RX ORDER — FENTANYL CITRATE 50 UG/ML
25-200 INJECTION, SOLUTION INTRAMUSCULAR; INTRAVENOUS
Status: DISCONTINUED | OUTPATIENT
Start: 2018-06-22 | End: 2018-06-22 | Stop reason: HOSPADM

## 2018-06-22 RX ORDER — METOPROLOL TARTRATE 25 MG/1
25 TABLET, FILM COATED ORAL EVERY 12 HOURS
Qty: 60 TAB | Refills: 2 | Status: SHIPPED | OUTPATIENT
Start: 2018-06-22 | End: 2018-06-25

## 2018-06-22 RX ORDER — AMIODARONE HYDROCHLORIDE 200 MG/1
400 TABLET ORAL EVERY 12 HOURS
Status: DISCONTINUED | OUTPATIENT
Start: 2018-06-24 | End: 2018-06-25 | Stop reason: HOSPADM

## 2018-06-22 RX ORDER — SODIUM CHLORIDE 0.9 % (FLUSH) 0.9 %
5-10 SYRINGE (ML) INJECTION AS NEEDED
Status: DISCONTINUED | OUTPATIENT
Start: 2018-06-22 | End: 2018-06-25 | Stop reason: HOSPADM

## 2018-06-22 RX ORDER — MUPIROCIN 20 MG/G
1 OINTMENT TOPICAL 2 TIMES DAILY
Status: DISCONTINUED | OUTPATIENT
Start: 2018-06-24 | End: 2018-06-25 | Stop reason: HOSPADM

## 2018-06-22 RX ORDER — DEXTROSE 50 % IN WATER (D50W) INTRAVENOUS SYRINGE
12.5-25 AS NEEDED
Status: DISCONTINUED | OUTPATIENT
Start: 2018-06-22 | End: 2018-06-25 | Stop reason: HOSPADM

## 2018-06-22 RX ORDER — METOPROLOL TARTRATE 50 MG/1
50 TABLET ORAL EVERY 12 HOURS
Status: DISCONTINUED | OUTPATIENT
Start: 2018-06-22 | End: 2018-06-25 | Stop reason: HOSPADM

## 2018-06-22 RX ORDER — LOSARTAN POTASSIUM 50 MG/1
50 TABLET ORAL DAILY
Qty: 30 TAB | Refills: 0 | Status: ON HOLD
Start: 2018-06-22 | End: 2018-07-17

## 2018-06-22 RX ORDER — BACITRACIN 500 UNIT/G
PACKET (EA) TOPICAL
Status: COMPLETED
Start: 2018-06-22 | End: 2018-06-22

## 2018-06-22 RX ORDER — DIPHENHYDRAMINE HYDROCHLORIDE 50 MG/ML
INJECTION, SOLUTION INTRAMUSCULAR; INTRAVENOUS
Status: COMPLETED
Start: 2018-06-22 | End: 2018-06-22

## 2018-06-22 RX ORDER — LIDOCAINE HYDROCHLORIDE 10 MG/ML
3-30 INJECTION, SOLUTION EPIDURAL; INFILTRATION; INTRACAUDAL; PERINEURAL
Status: DISCONTINUED | OUTPATIENT
Start: 2018-06-22 | End: 2018-06-22 | Stop reason: HOSPADM

## 2018-06-22 RX ORDER — SODIUM CHLORIDE 0.9 % (FLUSH) 0.9 %
5-10 SYRINGE (ML) INJECTION EVERY 8 HOURS
Status: DISCONTINUED | OUTPATIENT
Start: 2018-06-22 | End: 2018-06-25 | Stop reason: HOSPADM

## 2018-06-22 RX ORDER — LOSARTAN POTASSIUM 50 MG/1
50 TABLET ORAL DAILY
Status: DISCONTINUED | OUTPATIENT
Start: 2018-06-23 | End: 2018-06-24

## 2018-06-22 RX ORDER — AMIODARONE HYDROCHLORIDE 400 MG/1
400 TABLET ORAL EVERY 12 HOURS
Qty: 30 TAB | Refills: 1 | Status: SHIPPED | OUTPATIENT
Start: 2018-06-24 | End: 2018-07-18

## 2018-06-22 RX ORDER — SODIUM CHLORIDE 9 MG/ML
75 INJECTION, SOLUTION INTRAVENOUS CONTINUOUS
Status: DISCONTINUED | OUTPATIENT
Start: 2018-06-22 | End: 2018-06-22 | Stop reason: HOSPADM

## 2018-06-22 RX ORDER — METOPROLOL TARTRATE 25 MG/1
25 TABLET, FILM COATED ORAL EVERY 12 HOURS
Status: DISCONTINUED | OUTPATIENT
Start: 2018-06-22 | End: 2018-06-22

## 2018-06-22 RX ORDER — SODIUM CHLORIDE 9 MG/ML
3 INJECTION, SOLUTION INTRAVENOUS CONTINUOUS
Status: DISPENSED | OUTPATIENT
Start: 2018-06-22 | End: 2018-06-22

## 2018-06-22 RX ORDER — ACETAMINOPHEN 650 MG/1
650 SUPPOSITORY RECTAL
Status: DISCONTINUED | OUTPATIENT
Start: 2018-06-22 | End: 2018-06-25 | Stop reason: HOSPADM

## 2018-06-22 RX ORDER — LOSARTAN POTASSIUM 50 MG/1
100 TABLET ORAL DAILY
Status: DISCONTINUED | OUTPATIENT
Start: 2018-06-22 | End: 2018-06-22

## 2018-06-22 RX ORDER — HEPARIN SODIUM 1000 [USP'U]/ML
1000-10000 INJECTION, SOLUTION INTRAVENOUS; SUBCUTANEOUS AS NEEDED
Status: DISCONTINUED | OUTPATIENT
Start: 2018-06-22 | End: 2018-06-22 | Stop reason: HOSPADM

## 2018-06-22 RX ORDER — ATORVASTATIN CALCIUM 20 MG/1
20 TABLET, FILM COATED ORAL
Status: DISCONTINUED | OUTPATIENT
Start: 2018-06-22 | End: 2018-06-25 | Stop reason: HOSPADM

## 2018-06-22 RX ORDER — MIDAZOLAM HYDROCHLORIDE 1 MG/ML
1-10 INJECTION, SOLUTION INTRAMUSCULAR; INTRAVENOUS
Status: DISCONTINUED | OUTPATIENT
Start: 2018-06-22 | End: 2018-06-22 | Stop reason: HOSPADM

## 2018-06-22 RX ORDER — INSULIN LISPRO 100 [IU]/ML
INJECTION, SOLUTION INTRAVENOUS; SUBCUTANEOUS
Status: DISCONTINUED | OUTPATIENT
Start: 2018-06-22 | End: 2018-06-25

## 2018-06-22 RX ORDER — SODIUM CHLORIDE 0.9 % (FLUSH) 0.9 %
10 SYRINGE (ML) INJECTION AS NEEDED
Status: DISCONTINUED | OUTPATIENT
Start: 2018-06-22 | End: 2018-06-22 | Stop reason: HOSPADM

## 2018-06-22 RX ORDER — ATROPINE SULFATE 0.1 MG/ML
0.5 INJECTION INTRAVENOUS AS NEEDED
Status: DISCONTINUED | OUTPATIENT
Start: 2018-06-22 | End: 2018-06-22 | Stop reason: HOSPADM

## 2018-06-22 RX ORDER — AMLODIPINE BESYLATE 5 MG/1
5 TABLET ORAL
Status: COMPLETED | OUTPATIENT
Start: 2018-06-22 | End: 2018-06-22

## 2018-06-22 RX ORDER — MAGNESIUM SULFATE 100 %
4 CRYSTALS MISCELLANEOUS AS NEEDED
Status: DISCONTINUED | OUTPATIENT
Start: 2018-06-22 | End: 2018-06-25 | Stop reason: HOSPADM

## 2018-06-22 RX ORDER — SODIUM CHLORIDE 0.9 % (FLUSH) 0.9 %
5-10 SYRINGE (ML) INJECTION AS NEEDED
Status: DISCONTINUED | OUTPATIENT
Start: 2018-06-22 | End: 2018-06-22 | Stop reason: HOSPADM

## 2018-06-22 RX ORDER — PRAVASTATIN SODIUM 40 MG/1
40 TABLET ORAL
Status: DISCONTINUED | OUTPATIENT
Start: 2018-06-22 | End: 2018-06-22

## 2018-06-22 RX ORDER — SODIUM CHLORIDE 0.9 % (FLUSH) 0.9 %
5-10 SYRINGE (ML) INJECTION EVERY 8 HOURS
Status: DISCONTINUED | OUTPATIENT
Start: 2018-06-22 | End: 2018-06-22 | Stop reason: HOSPADM

## 2018-06-22 RX ADMIN — ATORVASTATIN CALCIUM 20 MG: 20 TABLET, FILM COATED ORAL at 22:25

## 2018-06-22 RX ADMIN — Medication 1 PACKET: at 14:56

## 2018-06-22 RX ADMIN — LORAZEPAM 1 MG: 1 TABLET ORAL at 14:55

## 2018-06-22 RX ADMIN — Medication 10 ML: at 22:24

## 2018-06-22 RX ADMIN — LOSARTAN POTASSIUM 100 MG: 50 TABLET ORAL at 10:34

## 2018-06-22 RX ADMIN — BACITRACIN 1 PACKET: 500 OINTMENT TOPICAL at 14:56

## 2018-06-22 RX ADMIN — DIPHENHYDRAMINE HYDROCHLORIDE 25 MG: 50 INJECTION, SOLUTION INTRAMUSCULAR; INTRAVENOUS at 08:20

## 2018-06-22 RX ADMIN — MIDAZOLAM HYDROCHLORIDE 2 MG: 1 INJECTION, SOLUTION INTRAMUSCULAR; INTRAVENOUS at 08:45

## 2018-06-22 RX ADMIN — FENTANYL CITRATE 50 MCG: 50 INJECTION, SOLUTION INTRAMUSCULAR; INTRAVENOUS at 08:45

## 2018-06-22 RX ADMIN — AMLODIPINE BESYLATE 5 MG: 5 TABLET ORAL at 08:05

## 2018-06-22 RX ADMIN — INSULIN LISPRO 3 UNITS: 100 INJECTION, SOLUTION INTRAVENOUS; SUBCUTANEOUS at 12:29

## 2018-06-22 RX ADMIN — HYDROCHLOROTHIAZIDE 25 MG: 25 TABLET ORAL at 09:43

## 2018-06-22 RX ADMIN — HEPARIN SODIUM 2000 UNITS: 200 INJECTION, SOLUTION INTRAVENOUS at 08:46

## 2018-06-22 RX ADMIN — INSULIN LISPRO 5 UNITS: 100 INJECTION, SOLUTION INTRAVENOUS; SUBCUTANEOUS at 16:18

## 2018-06-22 RX ADMIN — NITROGLYCERIN 2 INCH: 20 OINTMENT TOPICAL at 09:25

## 2018-06-22 RX ADMIN — ASPIRIN 81 MG 81 MG: 81 TABLET ORAL at 10:34

## 2018-06-22 RX ADMIN — SODIUM CHLORIDE 1.5 ML/KG/HR: 900 INJECTION, SOLUTION INTRAVENOUS at 09:43

## 2018-06-22 RX ADMIN — SODIUM CHLORIDE 3 ML/KG/HR: 900 INJECTION, SOLUTION INTRAVENOUS at 07:40

## 2018-06-22 RX ADMIN — LIDOCAINE HYDROCHLORIDE 10 ML: 10 INJECTION, SOLUTION EPIDURAL; INFILTRATION; INTRACAUDAL; PERINEURAL at 08:47

## 2018-06-22 RX ADMIN — METOPROLOL TARTRATE 25 MG: 25 TABLET ORAL at 09:43

## 2018-06-22 RX ADMIN — SODIUM CHLORIDE 1.5 ML/KG/HR: 900 INJECTION, SOLUTION INTRAVENOUS at 08:46

## 2018-06-22 NOTE — CONSULTS
[address]  217 [facility] 140 [address] [place], [address]  [phone]                     GI CONSULTATION NOTE  Conor Mejia AGACNP-BC  Work Cell: [phone]      NAME:  Sergey Lopez   :   1950   MRN:   [id number]       Referring Provider: SCAR Estrada    Consult Date: 2018     Chief Complaint: Cardiac cath     History of Present Illness:  Patient is a 78 y.o. who is seen in consultation at the request of Miryam Estrada for anemia. Mr. Inga Thomson has a PMH as detailed below. He presented to the hospital for cardiac cath which was completed this morning and revealed multivessel disease. He was admitted for further work-up for CABG. Hgb has been noted to be drifting down since 3/2018. He denies any nausea, vomiting, heartburn, abdominal pain, change in bowel habits, melena or hematochezia. No prior EGD/colonoscopy. No known FH of colon cancer. He has been on Plavix - last dose of this was this morning. PMH:  Past Medical History:   Diagnosis Date    CAD (coronary artery disease) 11/10/2016    NSTEMI & 2 stents    Deafness 10/28/2012    DM (diabetes mellitus) ([facility] [address]     Elevated cholesterol     Hypertension     NSTEMI (non-ST elevated myocardial infarction) ([facility] [address] 11/10/2016       PSH:  Past Surgical History:   Procedure Laterality Date    CARDIAC SURG PROCEDURE UNLIST  2016    2 stents    HX APPENDECTOMY         Allergies:  No Known Allergies    Home Medications:  Prior to Admission Medications   Prescriptions Last Dose Informant Patient Reported? Taking? JANUVIA 50 mg tablet 2018 at 800  No Yes   Sig: TAKE ONE TABLET BY MOUTH ONCE DAILY   aspirin 81 mg tablet 2018 at 500  Yes Yes   Sig: Take 81 mg by mouth daily. clopidogrel (PLAVIX) 75 mg tab 2018 at 500  No Yes   Sig: Take 1 Tab by mouth daily.    glipiZIDE (GLUCOTROL) 10 mg tablet 2018 at 800  No Yes   Sig: TAKE ONE TABLET BY MOUTH TWICE DAILY   glucose blood VI test strips (Fredderick Cover) strip Unknown at Unknown time  No No   Sig: Check sugar once daily   hydroCHLOROthiazide (HYDRODIURIL) 25 mg tablet 6/21/2018 at 800  Yes Yes   Sig: Take 25 mg by mouth daily (after breakfast). lancets (ONE TOUCH DELICA) 33 gauge misc Unknown at Unknown time  No No   Sig: Check sugar once daily   losartan (COZAAR) 100 mg tablet 6/22/2018 at 500  Yes Yes   metFORMIN (GLUCOPHAGE) 1,000 mg tablet 6/21/2018 at 800  No Yes   Sig: TAKE ONE TABLET BY MOUTH TWICE DAILY WITH MEALS   nitroglycerin (NITROSTAT) 0.4 mg SL tablet Not Taking at Unknown time  Yes No   pravastatin (PRAVACHOL) 40 mg tablet 6/21/2018 at 2200  No Yes   Sig: Take 1 Tab by mouth nightly. therapeutic multivitamin (THERAGRAN) tablet Unknown at Unknown time  Yes No   Sig: Take 1 Tab by mouth daily.       Facility-Administered Medications: None       Hospital Medications:  Current Facility-Administered Medications   Medication Dose Route Frequency    0.9% sodium chloride infusion  1.5 mL/kg/hr IntraVENous CONTINUOUS    sodium chloride (NS) flush 5-10 mL  5-10 mL IntraVENous Q8H    sodium chloride (NS) flush 5-10 mL  5-10 mL IntraVENous PRN    acetaminophen (TYLENOL) suppository 650 mg  650 mg Rectal Q4H PRN    hydroCHLOROthiazide (HYDRODIURIL) tablet 25 mg  25 mg Oral DAILY AFTER BREAKFAST    losartan (COZAAR) tablet 100 mg  100 mg Oral DAILY    pravastatin (PRAVACHOL) tablet 40 mg  40 mg Oral QHS    aspirin chewable tablet 81 mg  81 mg Oral DAILY    insulin lispro (HUMALOG) injection   SubCUTAneous AC&HS    glucose chewable tablet 16 g  4 Tab Oral PRN    dextrose (D50W) injection syrg 12.5-25 g  12.5-25 g IntraVENous PRN    glucagon (GLUCAGEN) injection 1 mg  1 mg IntraMUSCular PRN    metoprolol tartrate (LOPRESSOR) tablet 25 mg  25 mg Oral Q12H       Social History:  Social History   Substance Use Topics    Smoking status: Never Smoker    Smokeless tobacco: Never Used    Alcohol use 1.2 oz/week     1 Cans of beer, 1 Shots of liquor per week       Family History:  Family History   Problem Relation Age of Onset    Heart Disease Father     Hypertension Mother     Elevated Lipids Brother     Elevated Lipids Brother        Review of Systems:    Constitutional: negative fever, negative chills, negative weight loss  Eyes:   negative visual changes  ENT:   negative sore throat, tongue or lip swelling  Respiratory:  negative cough, negative dyspnea  Cards:  negative for chest pain, palpitations, lower extremity edema  GI:   See HPI  :  negative for frequency, dysuria  Integument:  negative for rash and pruritus  Heme:  negative for easy bruising and gum/nose bleeding  Musculoskel: negative for myalgias, back pain and muscle weakness  Neuro: negative for headaches, dizziness, vertigo  Psych:  negative for feelings of anxiety, depression      Objective:   Patient Vitals for the past 8 hrs:   BP Temp Pulse Resp SpO2 Height Weight   06/22/18 0930 170/79 97.6 °F (36.4 °C) 92 16 97 % - 63.2 kg (139 lb 5.3 oz)   06/22/18 0918 172/80 - 90 20 100 % - -   06/22/18 0700 (!) 254/88 97.7 °F (36.5 °C) 90 20 100 % 5' 3\" (1.6 m) 63 kg (139 lb)             PHYSICAL EXAM:  General: WD, WN. Alert, cooperative, no acute distress.    HEENT: NC, Atraumatic. PERRLA, EOMI. Anicteric sclerae. Lungs:  CTA Bilaterally. No Wheezing/Rhonchi/Rales. Heart:  Regular rate and rhythm, No murmur, No Rubs, No Gallops  Abdomen: Soft, non-distended, non-tender.  +Bowel sounds, no HSM  Extremities: No c/c/e  Neurologic:  Alert and oriented X 3. No acute neurological distress. Psych:   Good insight. Not anxious nor agitated. Data Review     Recent Labs      06/22/18   1011   WBC  7.0   HGB  9.8*   HCT  32.9*   PLT  218     No results for input(s): NA, K, CL, CO2, BUN, CREA, GLU, PHOS, CA in the last 72 hours. No results for input(s): SGOT, GPT, AP, TBIL, TP, ALB, GLOB, GGT, AML, LPSE in the last 72 hours.     No lab exists for component: AMYP, HLPSE  No results for input(s): INR, PTP, APTT in the last 72 hours. No lab exists for component: INREXT     Imaging studies reviewed      Assessment:   1. Anemia - with Hgb noted to be drifting down since 3/2018. He is without any GI symptoms or overt signs of bleeding. Would r/o potential GI source of blood loss. 2. CAD with multivessel disease  3. History of NSTEMI  4. Type 2 DM    Patient Active Problem List   Diagnosis Code    Dyslipidemia, goal LDL below 100 E78.5    Cramp of limb R25.2    Deafness H91.90    Essential hypertension with goal blood pressure less than 130/85 I10    Type 2 diabetes mellitus with hyperglycemia (HCC) E11.65    Nonrheumatic aortic valve stenosis I35.0    Bruit of right carotid artery R09.89    Preop general physical exam Z01.818    Colon cancer screening Z12.11    Dyspnea on exertion R06.09    Coronary artery disease involving native coronary artery of native heart without angina pectoris I25.10    Hypercholesteremia E78.00    Hypertension, essential I10    Statin intolerance Z78.9    NSTEMI (non-ST elevated myocardial infarction) (HCC) I21.4    CAD, multiple vessel I25.10              Plan:   -Diet as tolerated  -Supportive management per primary team  -Occult stool pending  -Plan for EGD/colonoscopy next week once off Plavix for 5 days and if okay with Cardiology   -Monitor H&H and follow clinical course for any overt signs of bleeding  -Transfuse as necessary   -Discussed with Dr. Juan Jose Ford  -Will follow along with you  -Thank you kindly for allowing us to participate in the care of this patient    I have examined the patient. I have reviewed the chart and agree with the documentation recorded by the NP, including the assessment, treatment plan, and disposition. ASSESSMENT AND PLAN:  78 yr old male presented with Iron def anemia and triple vessel disease and would likely require CABG. We will plan to do Colon/EGD once he is off Plavix for 5 days.   DW Dr. Bouchra Combs. Thank you for consultation.     Trace Tuttle MD

## 2018-06-22 NOTE — PROGRESS NOTES
TRANSFER - OUT REPORT:    Verbal report given to LORI Lao(name) on Jessie Keith  being transferred to CCU 23(unit) for routine progression of care       Report consisted of patients Situation, Background, Assessment and   Recommendations(SBAR). Information from the following report(s) Procedure Summary was reviewed with the receiving nurse. Lines:   Peripheral IV 06/22/18 Left Antecubital (Active)        Opportunity for questions and clarification was provided.       Patient transported with:   Registered Nurse

## 2018-06-22 NOTE — IP AVS SNAPSHOT
[address] [facility] [address] 
[phone] Patient: Sergey Lopez MRN: [id number] MCU:24/9/7179 A check ritu indicates which time of day the medication should be taken. My Medications START taking these medications Instructions Each Dose to Equal  
 Morning Noon Evening Bedtime  
 amiodarone 400 mg tablet Commonly known as:  Veronica Ag Your last dose was: Your next dose is: Take 1 Tab by mouth every twelve (12) hours. 400 mg  
    
   
   
   
  
 atorvastatin 20 mg tablet Commonly known as:  LIPITOR Your last dose was: Your next dose is: Take 1 Tab by mouth nightly. 20 mg  
    
   
   
   
  
 metoprolol tartrate 50 mg tablet Commonly known as:  LOPRESSOR Your last dose was: Your next dose is: Take 1 Tab by mouth every twelve (12) hours. 50 mg CHANGE how you take these medications Instructions Each Dose to Equal  
 Morning Noon Evening Bedtime  
 losartan 50 mg tablet Commonly known as:  COZAAR What changed:   
- medication strength 
- how much to take 
- how to take this - when to take this Your last dose was: Your next dose is: Take 1 Tab by mouth daily. 50 mg CONTINUE taking these medications Instructions Each Dose to Equal  
 Morning Noon Evening Bedtime  
 aspirin 81 mg tablet Your last dose was: Your next dose is: Take 81 mg by mouth daily. 81 mg  
    
   
   
   
  
 glipiZIDE 10 mg tablet Commonly known as:  Silva Dodge Your last dose was: Your next dose is: TAKE ONE TABLET BY MOUTH TWICE DAILY  
     
   
   
   
  
 glucose blood VI test strips strip Commonly known as:  Rashaun Mortensen Your last dose was: Your next dose is:    
   
   
 Check sugar once daily hydroCHLOROthiazide 25 mg tablet Commonly known as:  HYDRODIURIL Your last dose was: Your next dose is: Take 25 mg by mouth daily (after breakfast). 25 mg  
    
   
   
   
  
 JANUVIA 50 mg tablet Generic drug:  SITagliptin Your last dose was: Your next dose is: TAKE ONE TABLET BY MOUTH ONCE DAILY  
     
   
   
   
  
 lancets 33 gauge Misc Commonly known as: One Touch William Naida Your last dose was: Your next dose is:    
   
   
 Check sugar once daily  
     
   
   
   
  
 metFORMIN 1,000 mg tablet Commonly known as:  GLUCOPHAGE Your last dose was: Your next dose is: TAKE ONE TABLET BY MOUTH TWICE DAILY WITH MEALS  
     
   
   
   
  
 nitroglycerin 0.4 mg SL tablet Commonly known as:  NITROSTAT Your last dose was: Your next dose is:    
   
   
      
   
   
   
  
 therapeutic multivitamin tablet Commonly known as:  [facility] Your last dose was: Your next dose is: Take 1 Tab by mouth daily. 1 Tab STOP taking these medications   
 clopidogrel 75 mg Tab Commonly known as:  PLAVIX  
   
  
 pravastatin 40 mg tablet Commonly known as:  PRAVACHOL Where to Get Your Medications These medications were sent to [address], [address] [facility]  [address] [facility] [address] Phone:  [phone]  
  amiodarone 400 mg tablet  
 atorvastatin 20 mg tablet  
 metoprolol tartrate 50 mg tablet Information on where to get these meds will be given to you by the nurse or doctor. ! Ask your nurse or doctor about these medications  
  losartan 50 mg tablet

## 2018-06-22 NOTE — PROGRESS NOTES
Cardiac Cath Lab Recovery Arrival Note:      Cristobal Preciado arrived to Cardiac Cath Lab, Recovery Area. Staff introduced to patient. Patient identifiers verified with NAME and DATE OF BIRTH. Procedure verified with patient. Consent forms reviewed and signed by patient or authorized representative and verified. Allergies verified. Patient and family oriented to department. Patient and family informed of procedure and plan of care. Questions answered with review. Patient prepped for procedure, per orders from physician, prior to arrival.    Patient on cardiac monitor, non-invasive blood pressure, SPO2 monitor. On room air. Patient is A&Ox 4. Patient reports no chest pain. Patient in stretcher, in low position, with side rails up, call bell within reach, patient instructed to call if assistance as needed. Patient prep in: [address], [place]. Patient family has pager # [phone]  Family in: wife in hospital.   Prep by: Arturo Alaniz RN    Pre cath teaching completed.   Wife and pt both state English as primary language for health care

## 2018-06-22 NOTE — PROCEDURES
Cardiac Catheterization Procedure Note   [facility],  Division of [address] [place] [address] [facility] [place]. [unfilled]  Patient: Evonne Perez  MRN: [id number]  SSN: [id number]   YOB: 1950 Age: 78 y.o.   Sex: male    Date of Procedure: 6/22/2018   Pre-procedure Diagnosis: Coronary Artery Disease  Post-procedure Diagnosis: Coronary Artery Disease  Procedure: Left Heart Cath, LV gram and coronaries  :  Dr. Mar Anna MD    Assistant(s):  None  Anesthesia: Moderate Sedation   Estimated Blood Loss: Less than 10 mL   Specimens Removed: None  Findings:   Evonne Perez is a 78 y.o. male had silent MI in 2016 with stents to Circ and LAD  I have reviewed previous films and pt had some distal left main and ostial LAD and Circ dz  Pt underwent stress echo in office last week and had drop in bp with exercise ,and global HK at peak exercise with drop in EF from 55 to 45   With new EKG changes  No cp but admits some SOB  preop labs with lower Hgb at 9.3 unexplained    Cath today   Dampened even with 5F cath on engagement at 20-30 points on both left main and RCA ostial  Suspect ostial RCA, LM and Circ disease  Given stress echo findings, hx of silent Mi and diabetes   Recommend CABG after off Plavix for 5 days per CT surgery protocol   In meantime needs work up for anemia, will ask Heme and Gi to see    Complications: None   Implants:  None  Signed by:  Mar Anna MD  6/22/2018  12:50 PM

## 2018-06-22 NOTE — PROCEDURES
[facility]  *** FINAL REPORT ***    Name: John Mercado  MRN: [id number]    Inpatient  : 05 Oct 1950  HIS Order #: [id number]  [address] [facility] Visit #: [id number]  Date: 2018    TYPE OF TEST: Cerebrovascular Duplex    REASON FOR TEST  pre-op for cardiac surgery    Right Carotid:-             Proximal               Mid                 Distal  cm/s  Systolic  Diastolic  Systolic  Diastolic  Systolic  Diastolic  CCA:     08.2      14.0                            78.0      18.0  Bulb:  ECA:     99.0  ICA:     90.0      23.0       75.0      20.0      104.0      33.0  ICA/CCA:  1.2       1.3    ICA Stenosis:    Right Vertebral:-  Finding: Antegrade  Sys:       30.0  Lubna:        0.0    Right Subclavian:    Left Carotid:-            Proximal                Mid                 Distal  cm/s  Systolic  Diastolic  Systolic  Diastolic  Systolic  Diastolic  CCA:    873.9      18.0                            83.0       9.0  Bulb:  ECA:    106.0  ICA:    116.0      20.0       91.0      24.0       82.0      25.0  ICA/CCA:  1.4       2.2    ICA Stenosis:    Left Vertebral:-  Finding: Antegrade  Sys:       79.0  Lubna:    Left Subclavian:    INTERPRETATION/FINDINGS  PROCEDURE:  Color duplex ultrasound imaging of extracranial  cerebrovascular arteries. FINDINGS:       Right:  Internal carotid velocity is within normal limits. There   is narrowing of the internal carotid flow channel on color Doppler  imaging and non-calcific plaque on B-mode imaging, consistent with  less than 50 percent stenosis (lower portion of the 0 to 49 percent  range). The common and external carotid arteries are patent and  without evidence of hemodynamically significant stenosis. Left:  Internal carotid velocity is within normal limits. There  is narrowing of the internal carotid flow channel on color Doppler  imaging and mixed density plaque on B-mode imaging, consistent with  less than 50 percent stenosis.   The common and external carotid  arteries are patent and without evidence of hemodynamically  significant stenosis. IMPRESSION:  Consistent with less than 50% stenosis (low end) of the  right internal carotid and less than 50% stenosis of the left internal   carotid. Vertebrals are patent with antegrade flow. Right vertebral   flow is resistive (absent diastolic flow) and a more distal  obstruction/occlusion cannot be completely excluded. ADDITIONAL COMMENTS    I have personally reviewed the data relevant to the interpretation of  this  study.     TECHNOLOGIST: Costa Hess RVT  Signed: 06/22/2018 02:36 PM    PHYSICIAN: Franki Cuenca MD  Signed: 06/22/2018 04:25 PM

## 2018-06-22 NOTE — CONSULTS
CSS   Consult    Subjective:      Chief complaint: Tana Hale is a 78 y.o. DM2, non smoking male who was referred for cardiac evaluation from Dr Gregory Saavedra. The patient has been experiencing progressive DONALDSON over the last several months. The SOB has been getting worse and occurs with less activity. The SOB resolves with rest. The pt has a history of stents to LAD and CX in 2016 for which he takes chronic plavix, last dose yesterday. He had a positive EST which led to cardiac cath with results below. He denies chest pain, syncope, near syncope, palpitations. He has had a slow drop in Hgb since 3/18, denies nausea, vomiting, melena, hematochezia. He does experience leg cramps mostly at night, not with activity. Cardiac Testing  Cardiac catheterization:   Dampened even with 5F cath on engagement at 20-30 points on both left main and RCA ostial  Suspect ostial RCA, LM and Circ disease  Given stress echo findings, hx of silent Mi and diabetes   Recommend CABG after off Plavix for 5 days per CT surgery protocol     ECHO: pending    Past Medical History:   Diagnosis Date    CAD (coronary artery disease) 11/10/2016    NSTEMI & 2 stents    Deafness 10/28/2012    DM (diabetes mellitus) ([facility] [address]     Elevated cholesterol     Hypertension     NSTEMI (non-ST elevated myocardial infarction) ([facility] [address] 11/10/2016     Past Surgical History:   Procedure Laterality Date    CARDIAC SURG PROCEDURE UNLIST  11/11/2016    2 stents    HX APPENDECTOMY        Social History   Substance Use Topics    Smoking status: Never Smoker    Smokeless tobacco: Never Used    Alcohol use 1.2 oz/week     1 Cans of beer, 1 Shots of liquor per week      Family History   Problem Relation Age of Onset    Heart Disease Father     Hypertension Mother     Elevated Lipids Brother     Elevated Lipids Brother      Prior to Admission medications    Medication Sig Start Date End Date Taking?  Authorizing Provider   metFORMIN (GLUCOPHAGE) 1,000 mg tablet TAKE ONE TABLET BY MOUTH TWICE DAILY WITH MEALS 5/21/18  Yes Royer Singer MD   glipiZIDE (GLUCOTROL) 10 mg tablet TAKE ONE TABLET BY MOUTH TWICE DAILY 5/11/18  Yes Royer Singer MD   losartan (COZAAR) 100 mg tablet  2/12/18  Yes Historical Provider   hydroCHLOROthiazide (HYDRODIURIL) 25 mg tablet Take 25 mg by mouth daily (after breakfast). 2/17/18  Yes Historical Provider   JANUVIA 50 mg tablet TAKE ONE TABLET BY MOUTH ONCE DAILY 1/27/18  Yes Royer Singer MD   clopidogrel (PLAVIX) 75 mg tab Take 1 Tab by mouth daily. 12/15/17  Yes Author Natalio MD   pravastatin (PRAVACHOL) 40 mg tablet Take 1 Tab by mouth nightly. 1/5/17  Yes Royer Singer MD   aspirin 81 mg tablet Take 81 mg by mouth daily. Yes Historical Provider   lancets (ONE TOUCH DELICA) 33 gauge misc Check sugar once daily 10/4/17   Royer Singer MD   nitroglycerin (NITROSTAT) 0.4 mg SL tablet  5/8/17   Historical Provider   glucose blood VI test strips (ONETOUCH VERIO) strip Check sugar once daily 12/6/16   Royer Singer MD   therapeutic multivitamin [facility]) tablet Take 1 Tab by mouth daily. Historical Provider       No Known Allergies    Review of Systems:   Consititutional: Denies fever or chills. Eyes:  Denies use of glasses or vision problems(cataracts). ENT:  Denies hearing or swallowing difficulty. CV: Denies CP, claudication, +HTN. Resp: Denies dyspnea, +chronic dry cough  : Denies dialysis or kidney problems. GI: Denies ulcers, esophageal strictures, liver problems. M/S: Denies joint or bone problems, or implanted artificial hardware. Skin: Denies varicose veins, edema. Neuro: Denies strokes, or TIAs. Psych: Denies anxiety or depression. Endocrine: Denies thyroid problems or diabetes. Heme/Lymphatic: Denies easy bruising or lymphedema.      Objective:     VS:     Physical Exam:    General appearance: alert, cooperative, no distress  Head: normocephalic, without obvious abnormality; atraumatic  Eyes: conjunctivae/corneas clear; EOM's intact. Nose: nares normal; no drainage. Neck: no carotid bruit and no JVD  Lungs: clear to auscultation bilaterally  Heart: regular rate and rhythm; no murmur  Abdomen: soft, non-tender; bowel sounds normal  Extremities: moves all extremities; no weakness. Skin: Skin color normal; No varicose veins or edema. Neurologic: Grossly normal      Labs:   Recent Labs      06/22/18   1011  06/22/18   0738   WBC  7.0   --    HGB  9.8*   --    HCT  32.9*   --    PLT  218   --    NA  135*   --    K  5.8*   --    BUN  13   --    CREA  0.91   --    GLU  198*   --    GLUCPOC   --   201*       Diagnostics:   PA and lateral: pending    Carotid doppler: pending    PFTS-FEV1: pending    EKG: NSR, 12 lead pending    Assessment:     Active Problems:    NSTEMI (non-ST elevated myocardial infarction) ([address] (6/22/2018)      CAD, multiple vessel (6/22/2018)        Plan:     STS Risk Calculator V2.81 - Discussed by surgeon with patient. Procedure: CAB Only   Risk of Mortality: 0.91%   Morbidity or Mortality: 10.028%   Long Length of Stay: 3.164%   Short Length of Stay: 58.456%   Permanent Stroke: 0.971%   Prolonged Ventilation: 5.861%   DSW Infection: 0.196%   Renal Failure: 2.243%   Reoperation: 4.572%     Treatment Plan:    1. CAD S/P Stents 2016, on Plavix. Needs CABG but will wait for plavix washout. 2. TR- On TTE from 2017, mild TR but also PAS 50. Needs TTE  2. Anemia on plavix- GI consulted  3. HTN- on BB, ARB. Will need to hold ARB 48hrs prior to CABG  4. DM2- Hgba1c 7  5. Dispo- keep in hospital while workup anemia and await plavix washout in anticipation of CABG. Will d/w Dr Gibson Her.     Signed By: Frederick Charles PA-C     June 22, 2018      Pt seen and examined  Cath reviewed and note reviewed and confirmed  Discussed with Dr. Magda Chavez cardiology  Timing of CABG to be determined by clinical condition currently stable and anemia work up

## 2018-06-22 NOTE — PROGRESS NOTES
Stopped by to have patient complete bedside PFT, but patient unable to sit upright due to sheath just being removed from groin. Will notify floor RT so they can complete test when appropriate for patient. Chun Arnold RN aware.

## 2018-06-22 NOTE — TELEPHONE ENCOUNTER
Discussed with pt today in precath area and have met with wife  Pt has ostial Left main RCA and Circ disease  Will admit for w/u anemia and CABG

## 2018-06-22 NOTE — PROCEDURES
[facility]  *** FINAL REPORT ***    Name: Evonne Post  MRN: [id number]    Inpatient  : 05 Oct 1950  HIS Order #: [id number]  [id number] [facility] Visit #: [id number]  Date: 2018    TYPE OF TEST: Peripheral Arterial Testing    REASON FOR TEST  pre-op for cardiac surgery    Right Leg  Doppler:  PVR:  Ankle/Brachial: 1.12    Left Leg  Doppler:  PVR:  Ankle/Brachial: 1.18    INTERPRETATION/FINDINGS  PROCEDURE:  Evaluation of lower extremity arteries with systolic blood   pressure measurement at the ankle and brachial level and calculation  of the ankle/brachial pressure index (MARIANELA). Unless otherwise  indicated, the exam also includes pulse volume recording (PVR)  plethysmography at the ankle level. FINDINGS:  MARIANELA is 1.12 on the right and 1.18 on the left. PVR  waveforms are near normal at the right and left ankles. IMPRESSION:  There is no evidence of hemodynamically significant lower   extremity arterial obstruction. ADDITIONAL COMMENTS    I have personally reviewed the data relevant to the interpretation of  this  study.     TECHNOLOGIST: Zada Felty, RVT  Signed: 2018 05:12 PM    PHYSICIAN: Petra Chavez MD  Signed: 2018 06:45 AM

## 2018-06-22 NOTE — IP AVS SNAPSHOT
[facility] 
[phone] Patient: Leanne Mathias MRN: [id number] YGD:[id number] About your hospitalization You were admitted on:  June 22, 2018 You last received care in the:  [facility] 4 CV SERVICES UNIT You were discharged on:  June 25, 2018 Why you were hospitalized Your primary diagnosis was:  Not on File Your diagnoses also included:  Nstemi (Non-St Elevated Myocardial Infarction) (Hcc), Cad, Multiple Vessel Follow-up Information Follow up With Details Comments Contact Info Shakira Pathak MD   [address] [facility] 
[phone] Pablito Moore MD On 7/3/2018 Time: [address] [facility] [address] [facility] 
[phone] Your Scheduled Appointments Thursday June 28, 2018 ESOPHAGOGASTRODUODENOSCOPY (EGD), COLONOSCOPY with Peña High MD  
[facility] ENDOSCOPY (RI OR PRE ASSESSMENT) [address] [facility]  
[phone] OP Registration: [facility] R[facility]G[address] Telephone: [phone] Fax: [phone] ** Pt will need to arrive 30 min prior unless appointment prep instructions indicate otherwise** **** Send All ENDO pt to the MAIN Admitting Department, off the [facility] main lobby at [facility]. ****  
  
    
OP Registration: [facility] [employer]- [address] [place] [address] Telephone: [phone] Fax: [phone] ** Pt will need to arrive 30 min prior unless appointment prep instructions indicate otherwise** **** Send All ENDO pt to the MAIN Admitting Department, off the [facility] main lobby at [facility]. **** Tuesday July 03, 2018 11:00 AM EDT Follow Up with Pablito Moore MD  
[facility] [address] 7 [address]  
[phone] Monday July 16, 2018  8:40 AM EDT Medicare Physical with Shakira Pathak MD  
 403 [facility] ([facility] 222 [facility] [address] [place] [address]  
[phone] Discharge Orders None A check ritu indicates which time of day the medication should be taken. My Medications START taking these medications Instructions Each Dose to Equal  
 Morning Noon Evening Bedtime  
 amiodarone 400 mg tablet Commonly known as:  Toño Gonzales Your last dose was: Your next dose is: Take 1 Tab by mouth every twelve (12) hours. 400 mg  
    
   
   
   
  
 atorvastatin 20 mg tablet Commonly known as:  LIPITOR Your last dose was: Your next dose is: Take 1 Tab by mouth nightly. 20 mg  
    
   
   
   
  
 metoprolol tartrate 50 mg tablet Commonly known as:  LOPRESSOR Your last dose was: Your next dose is: Take 1 Tab by mouth every twelve (12) hours. 50 mg CHANGE how you take these medications Instructions Each Dose to Equal  
 Morning Noon Evening Bedtime  
 losartan 50 mg tablet Commonly known as:  COZAAR What changed:   
- medication strength 
- how much to take 
- how to take this - when to take this Your last dose was: Your next dose is: Take 1 Tab by mouth daily. 50 mg CONTINUE taking these medications Instructions Each Dose to Equal  
 Morning Noon Evening Bedtime  
 aspirin 81 mg tablet Your last dose was: Your next dose is: Take 81 mg by mouth daily. 81 mg  
    
   
   
   
  
 glipiZIDE 10 mg tablet Commonly known as:  iRsa Mendoza Your last dose was: Your next dose is: TAKE ONE TABLET BY MOUTH TWICE DAILY  
     
   
   
   
  
 glucose blood VI test strips strip Commonly known as:  Anthony Ross Your last dose was: Your next dose is:    
   
   
 Check sugar once daily hydroCHLOROthiazide 25 mg tablet Commonly known as:  HYDRODIURIL Your last dose was: Your next dose is: Take 25 mg by mouth daily (after breakfast). 25 mg  
    
   
   
   
  
 JANUVIA 50 mg tablet Generic drug:  SITagliptin Your last dose was: Your next dose is: TAKE ONE TABLET BY MOUTH ONCE DAILY  
     
   
   
   
  
 lancets 33 gauge Misc Commonly known as: One Touch Kostas Eliseo Your last dose was: Your next dose is:    
   
   
 Check sugar once daily  
     
   
   
   
  
 metFORMIN 1,000 mg tablet Commonly known as:  GLUCOPHAGE Your last dose was: Your next dose is: TAKE ONE TABLET BY MOUTH TWICE DAILY WITH MEALS  
     
   
   
   
  
 nitroglycerin 0.4 mg SL tablet Commonly known as:  NITROSTAT Your last dose was: Your next dose is:    
   
   
      
   
   
   
  
 therapeutic multivitamin tablet Commonly known as:  [facility] Your last dose was: Your next dose is: Take 1 Tab by mouth daily. 1 Tab STOP taking these medications   
 clopidogrel 75 mg Tab Commonly known as:  PLAVIX  
   
  
 pravastatin 40 mg tablet Commonly known as:  PRAVACHOL Where to Get Your Medications These [facility], [address] [facility] [address], [address] [address] Phone:  [phone]  
  amiodarone 400 mg tablet  
 atorvastatin 20 mg tablet  
 metoprolol tartrate 50 mg tablet Information on where to get these meds will be given to you by the nurse or doctor. ! Ask your nurse or doctor about these medications  
  losartan 50 mg tablet Discharge Instructions 1. Start amiodarone on Monday 2. Replace Pravachol with Lipitor 20 mg daily 3. Start back on metoprolol, take 50 mg twice a day 4. Cut back on your Losartan to 50 mg once a day 5. Dr Riddhi Tuttle will contact you to set up an endoscopy 6. Dr hCan Contreras will contact you to set up your bypass Call me for any medication, chest pain or other issues right away Lynn Dowd MD 
  [phone] [address] on Second Floor [email or web address] Announcement We are excited to announce that we are making your provider's discharge notes available to you in [email or web address]. You will see these notes when they are completed and signed by the physician that discharged you from your recent hospital stay. If you have any questions or concerns about any information you see in [email or web address], please call the Health Information Department where you were seen or reach out to your Primary Care Provider for more information about your plan of care. Introducing [facility]! Steve An introduces [email or web address] patient portal. Now you can access parts of your medical record, email your doctor's office, and request medication refills online. 1. In your internet browser, go to [email or web address] 2. Click on the First Time User? Click Here link in the Sign In box. You will see the New Member Sign Up page. 3. Enter your [email or web address] Access Code exactly as it appears below. You will not need to use this code after youve completed the sign-up process. If you do not sign up before the expiration date, you must request a new code. · [email or web address] Access Code: [email or web address] Expires: 9/23/2018 10:49 AM 
 
4. Enter the last four digits of your Social Security Number (xxxx) and Date of Birth (mm/dd/yyyy) as indicated and click Submit. You will be taken to the next sign-up page. 5. Create a [email or web address] ID. This will be your [email or web address] login ID and cannot be changed, so think of one that is secure and easy to remember. 6. Create a EverZerot password. You can change your password at any time. 7. Enter your Password Reset Question and Answer. This can be used at a later time if you forget your password. 8. Enter your e-mail address. You will receive e-mail notification when new information is available in [address] 9. Click Sign Up. You can now view and download portions of your medical record. 10. Click the Download Summary menu link to download a portable copy of your medical information. If you have questions, please visit the Frequently Asked Questions section of the Minbox website. Remember, Minbox is NOT to be used for urgent needs. For medical emergencies, dial 911. Now available from your iPhone and Android! Introducing Kyle Shipley As a [email or web address] patient, I wanted to make you aware of our electronic visit tool called Kyle Shipley. Cozi 24/Leadhit allows you to connect within minutes with a medical provider 24 hours a day, seven days a week via a mobile device or tablet or logging into a secure website from your computer. You can access Kyle Shipley from anywhere in the [place]. A virtual visit might be right for you when you have a simple condition and feel like you just dont want to get out of bed, or cant get away from work for an appointment, when your regular [facility] provider is not available (evenings, weekends or holidays), or when youre out of town and need minor care. Electronic visits cost only $49 and if the Cozi 24/Leadhit provider determines a prescription is needed to treat your condition, one can be electronically transmitted to a nearby pharmacy*. Please take a moment to enroll today if you have not already done so. The enrollment process is free and takes just a few minutes. To enroll, please download the Arrayit/Leadhit dominick to your tablet or phone, or visit [email or web address] to enroll on your computer.    
And, as an [address] [place] [address] patient with a [employer] account, the results of your visits will be scanned into your electronic medical record and your primary care provider will be able to view the scanned results. We urge you to continue to see your regular Jyl Oka provider for your ongoing medical care. And while your primary care provider may not be the one available when you seek a Kyle Shipley virtual visit, the peace of mind you get from getting a real diagnosis real time can be priceless. For more information on Kyle Shipley, view our Frequently Asked Questions (FAQs) at [email or web address]. Sincerely, 
 
Kassy Painter MD 
Chief Medical Officer 64Anaya Weinberg *:  certain medications cannot be prescribed via Kyle Mejiaclarawendi Unresulted tests-please follow up with your PCP on these results Procedure/Test Authorizing Provider 2D ECHO COMPLETE ADULT (TTE) W OR WO CONTR Samia , PA-C  ANKLE BRACHIAL INDEX Samia Jain PA-C  
 CBC W/O DIFF Rodney Augustin MD  
 CBC W/O DIFF Flaco Mario PA-C  
 CBC WITH AUTOMATED DIFF Adilson Gupta NP  
 DUPLEX CAROTID BILATERAL Samia Jain PA-C  
 DUPLEX LOWER EXT VEIN MAP BILAT Lena Fay NP  
 ECG RHYTHM ANALYSIS ADULT Rodney Augustin MD  
 EKG, 12 LEAD, INITIAL Flaco Mario PA-C  
 HEMOGLOBIN A1C WITH Fatoumata Villafuerte PA-C  
 IRON PROFILE Rodney Augustin MD  
 LIPID PANEL Flaco Mario PA-C  
 METABOLIC PANEL, BASIC Rodney Augustin MD  
 METABOLIC PANEL, COMPREHENSIVE Flaco Mario PA-C  
 POC G3 - PUL Rodney Augustin MD  
 PROTHROMBIN TIME + INR Lena Fay NP  
 PTT Lena Fay NP  
 TSH 3RD GENERATION Alvin Kitchen PA-C  
 URINALYSIS W/ REFLEX CULTURE Lena Fay NP  
 XR CHEST [address] Mohamud, PA-C  
  
 Providers Seen During Your Hospitalization Provider Specialty Primary office phone Manjit Martinez MD Cardiology [phone] Your Primary Care Physician (PCP) Primary Care Physician Office Phone Office Nikko Otilio Gloria [phone] You are allergic to the following No active allergies Recent Documentation Height Weight BMI Smoking Status 1.6 m 60.5 kg 23.63 kg/m2 Never Smoker Emergency Contacts Name Discharge Info Relation Home Work Mobile Susanna Kendall DISCHARGE CAREGIVER [3] Spouse [3] [phone] Patient Belongings The following personal items are in your possession at time of discharge: 
  Dental Appliances: None  Visual Aid: Glasses, At home   Hearing Aids/Status: Left, With patient  Home Medications: None   Jewelry: Ring (Gold Band left hand)  Clothing: Footwear, Shirt, Pants    Other Valuables: None Please provide this summary of care documentation to your next provider. Signatures-by signing, you are acknowledging that this After Visit Summary has been reviewed with you and you have received a copy. Patient Signature:  ____________________________________________________________ Date:  ____________________________________________________________  
  
Paulo Jolly Provider Signature:  ____________________________________________________________ Date:  ____________________________________________________________

## 2018-06-22 NOTE — DISCHARGE INSTRUCTIONS
1. Start amiodarone on Monday  2. Replace Pravachol with Lipitor 20 mg daily  3. Start back on metoprolol, take 50 mg twice a day  4. Cut back on your Losartan to 50 mg once a day  5. Dr Waleska Pandey will contact you to set up an endoscopy  6.  Dr Sonam Wilson will contact you to set up your bypass  Call me for any medication, chest pain or other issues right away  Author Media, MD [address] on [address] Floor

## 2018-06-22 NOTE — PROGRESS NOTES
0914: TRANSFER - IN REPORT:    Verbal report received from RN (name) on Neha Bullard  being received from CCL (unit) for routine progression of care      Report consisted of patients Situation, Background, Assessment and   Recommendations(SBAR). Information from the following report(s) SBAR, Kardex, ED Summary, Procedure Summary, Intake/Output, MAR, Recent Results and Cardiac Rhythm NSr was reviewed with the receiving nurse. Opportunity for questions and clarification was provided. Assessment completed upon patients arrival to unit and care assumed. 0930: Pt arrived to CCU 23. Sheath in right groin. Primary Nurse Loc De Leon, LORI and Eva Mckeon RN performed a dual skin assessment on this patient No impairment noted  Mike score is 16  Pt is in total care bed. Green mattress. 1100: pt complains of not being able to urinate due to not being able to stand. SCAR Bartlett, cardiology, notified. Orders received to straight cath pt. 900 mL out during straight cath. 1300: Dr. Sesar Yang, cardiology, at bedside. Orders received to pull sheath and insert donnelly until pt off of bedrest.     1345: Dr. Aren Garza, cardiac surgery at bedside. 1330: Pt's BP continues to be elevated after medication administration. Spoke with pt's wife about anxiety. Dr. Sesar Yang, cardiology, notified. Orders received for 1 mg ativan q6h.     1500: Sheath pulled, pt tolerated well.     1930: Bedside shift change report given to Brigido Rouse RN (oncoming nurse) by Kimberley Santiago RN (offgoing nurse). Report included the following information SBAR, Kardex, Procedure Summary, Intake/Output, MAR, Recent Results, Med Rec Status and Cardiac Rhythm NSR.

## 2018-06-22 NOTE — CARDIO/PULMONARY
Cardiac Rehab:  CABG educational folder at the bedside of Evonne Perez. Pathway Through Surgery updated. Nazanin [place] education booklet added. Wife at the bedside. Educated using teach back method. Assessed patient's understanding of diagnosis and upcoming surgery. Per wife, patient is to \"be off Plavix for 5 days, have a colonoscopy on 6/26/18 (d/t anemia), and plan for CABG on 6/28/18\". There is some question about whether or not he will be discharged and return for the procedures. Incentive Spirometer given however, patient just had sheaths pulled so demonstration deferred. Mentioned the cardiac rehab program, benefits, and encouraged patient's consideration of participation in a to assist with their risk modification and management. Evonne Perez and wife verbalized understanding with questions answered. Will continue to follow.  Nella Loving RN

## 2018-06-22 NOTE — PROGRESS NOTES
Cardiac Cath Lab Procedure Area Arrival Note:    Jair Sierra arrived to Cardiac Cath Lab, Procedure Area. Patient identifiers verified with NAME and DATE OF BIRTH. Procedure verified with patient. Consent forms verified. Allergies verified. Patient informed of procedure and plan of care. Questions answered with review. Patient voiced understanding of procedure and plan of care. Patient on cardiac monitor, non-invasive blood pressure, SPO2 monitor. On O2 @ 2 lpm via NC.  IV of NS on pump at 189 ml/hr. Patient status doing well without problems. Patient is A&Ox 3. Patient reports No Pain. Patient medicated during procedure with orders obtained and verified by Dr. Katherine Alamo.    Refer to patients Cardiac Cath Lab PROCEDURE REPORT for vital signs, assessment, status, and response during procedure, printed at end of case. Printed report on chart or scanned into chart.

## 2018-06-22 NOTE — PROGRESS NOTES
Problem: Falls - Risk of  Goal: *Absence of Falls  Document Laverne Fall Risk and appropriate interventions in the flowsheet. Outcome: Progressing Towards Goal  Fall Risk Interventions:            Medication Interventions: Bed/chair exit alarm, Evaluate medications/consider consulting pharmacy, Patient to call before getting OOB                  Problem: Pressure Injury - Risk of  Goal: *Prevention of pressure injury  Document Mike Scale and appropriate interventions in the flowsheet.    Outcome: Progressing Towards Goal  Pressure Injury Interventions:  Sensory Interventions: Assess need for specialty bed, Assess changes in LOC         Activity Interventions: Assess need for specialty bed, Pressure redistribution bed/mattress(bed type)    Mobility Interventions: HOB 30 degrees or less, Pressure redistribution bed/mattress (bed type)    Nutrition Interventions: Document food/fluid/supplement intake, Discuss nutritional consult with provider, Offer support with meals,snacks and hydration    Friction and Shear Interventions: HOB 30 degrees or less, Lift sheet

## 2018-06-23 LAB
ANION GAP SERPL CALC-SCNC: 9 MMOL/L (ref 5–15)
BUN SERPL-MCNC: 17 MG/DL (ref 6–20)
BUN/CREAT SERPL: 16 (ref 12–20)
CALCIUM SERPL-MCNC: 9.4 MG/DL (ref 8.5–10.1)
CHLORIDE SERPL-SCNC: 105 MMOL/L (ref 97–108)
CO2 SERPL-SCNC: 21 MMOL/L (ref 21–32)
CREAT SERPL-MCNC: 1.09 MG/DL (ref 0.7–1.3)
ERYTHROCYTE [DISTWIDTH] IN BLOOD BY AUTOMATED COUNT: 16.6 % (ref 11.5–14.5)
GLUCOSE BLD STRIP.AUTO-MCNC: 284 MG/DL (ref 65–100)
GLUCOSE BLD STRIP.AUTO-MCNC: 296 MG/DL (ref 65–100)
GLUCOSE BLD STRIP.AUTO-MCNC: 327 MG/DL (ref 65–100)
GLUCOSE BLD STRIP.AUTO-MCNC: 409 MG/DL (ref 65–100)
GLUCOSE SERPL-MCNC: 169 MG/DL (ref 65–100)
HCT VFR BLD AUTO: 34.7 % (ref 36.6–50.3)
HGB BLD-MCNC: 10.4 G/DL (ref 12.1–17)
MCH RBC QN AUTO: 23.5 PG (ref 26–34)
MCHC RBC AUTO-ENTMCNC: 30 G/DL (ref 30–36.5)
MCV RBC AUTO: 78.3 FL (ref 80–99)
NRBC # BLD: 0 K/UL (ref 0–0.01)
NRBC BLD-RTO: 0 PER 100 WBC
PLATELET # BLD AUTO: 221 K/UL (ref 150–400)
PMV BLD AUTO: 11 FL (ref 8.9–12.9)
POTASSIUM SERPL-SCNC: 4.9 MMOL/L (ref 3.5–5.1)
RBC # BLD AUTO: 4.43 M/UL (ref 4.1–5.7)
SERVICE CMNT-IMP: ABNORMAL
SODIUM SERPL-SCNC: 135 MMOL/L (ref 136–145)
WBC # BLD AUTO: 10.5 K/UL (ref 4.1–11.1)

## 2018-06-23 PROCEDURE — 74011250637 HC RX REV CODE- 250/637: Performed by: PHYSICIAN ASSISTANT

## 2018-06-23 PROCEDURE — 74011636637 HC RX REV CODE- 636/637: Performed by: PHYSICIAN ASSISTANT

## 2018-06-23 PROCEDURE — 51798 US URINE CAPACITY MEASURE: CPT

## 2018-06-23 PROCEDURE — 80048 BASIC METABOLIC PNL TOTAL CA: CPT | Performed by: SPECIALIST

## 2018-06-23 PROCEDURE — 82962 GLUCOSE BLOOD TEST: CPT

## 2018-06-23 PROCEDURE — 74011636637 HC RX REV CODE- 636/637: Performed by: INTERNAL MEDICINE

## 2018-06-23 PROCEDURE — 85027 COMPLETE CBC AUTOMATED: CPT | Performed by: SPECIALIST

## 2018-06-23 PROCEDURE — 65620000000 HC RM CCU GENERAL

## 2018-06-23 PROCEDURE — 36415 COLL VENOUS BLD VENIPUNCTURE: CPT | Performed by: SPECIALIST

## 2018-06-23 PROCEDURE — 74011250637 HC RX REV CODE- 250/637: Performed by: SPECIALIST

## 2018-06-23 RX ORDER — INSULIN LISPRO 100 [IU]/ML
10 INJECTION, SOLUTION INTRAVENOUS; SUBCUTANEOUS ONCE
Status: COMPLETED | OUTPATIENT
Start: 2018-06-23 | End: 2018-06-23

## 2018-06-23 RX ADMIN — Medication 10 ML: at 14:00

## 2018-06-23 RX ADMIN — INSULIN LISPRO 7 UNITS: 100 INJECTION, SOLUTION INTRAVENOUS; SUBCUTANEOUS at 08:09

## 2018-06-23 RX ADMIN — INSULIN LISPRO 10 UNITS: 100 INJECTION, SOLUTION INTRAVENOUS; SUBCUTANEOUS at 12:11

## 2018-06-23 RX ADMIN — LORAZEPAM 1 MG: 1 TABLET ORAL at 22:25

## 2018-06-23 RX ADMIN — ATORVASTATIN CALCIUM 20 MG: 20 TABLET, FILM COATED ORAL at 22:25

## 2018-06-23 RX ADMIN — ASPIRIN 81 MG 81 MG: 81 TABLET ORAL at 08:10

## 2018-06-23 RX ADMIN — METOPROLOL TARTRATE 50 MG: 50 TABLET ORAL at 10:19

## 2018-06-23 RX ADMIN — Medication 10 ML: at 22:26

## 2018-06-23 RX ADMIN — INSULIN LISPRO 3 UNITS: 100 INJECTION, SOLUTION INTRAVENOUS; SUBCUTANEOUS at 03:00

## 2018-06-23 RX ADMIN — INSULIN LISPRO 5 UNITS: 100 INJECTION, SOLUTION INTRAVENOUS; SUBCUTANEOUS at 17:00

## 2018-06-23 RX ADMIN — METOPROLOL TARTRATE 50 MG: 50 TABLET ORAL at 20:47

## 2018-06-23 RX ADMIN — INSULIN LISPRO 3 UNITS: 100 INJECTION, SOLUTION INTRAVENOUS; SUBCUTANEOUS at 22:30

## 2018-06-23 NOTE — PROGRESS NOTES
1945 rcd report from [address] walked to CCU hallway, assisted by Claudia Bateman activity well  Back in room and sat on the [facility] attempting to have BM  Passed flatus but no BM  Sat up in chair, denies dizziness, c/p or SOB  Urine noted pink tinged then became red tinged w/ few clots    2011 BP 92/43 while sitting    2015 BP 53/32 still sitting up in chair  Found pt hard to arouse, pale  Placed in bed  Alert, oriented x4 when pt awakened  Denies dizziness c/p or sob  Follows command, equal hand     Dr Minna Ochoa called and notified, ordered hold all antihypertensive meds tonight  Will hold Metoprolol   MD also notified of red tinged urine, ordered to keep donnelly tonight    0330 pt c/o pain re\"donnelly cath\". urime outpput is pink, donnelly dc/d, few clots seen when floley cath pulled out    Slept at long intervals, denies c/p or SOB    0649 BP lying 144/66  0652 BP sitting edge of bed 138/50  0700 BP standing 101/46  0715 BP sitting up in chair 69/44 and . Pt denies c/p or SOB, denies dizziness  C/o very sleepy  Appears pale, skin cool, dry  Placed back in bed. NSR Errol on monitor  Rt groin stable    0730 Bedside and Verbal shift change report given to Antonio Rose and Aleyda Clements RNs (oncoming nurse) by Gracie Pérez (offgoing nurse). Report included the following information SBAR, Kardex, Procedure Summary, Intake/Output, MAR, Recent Results and Cardiac Rhythm NSRErrol.

## 2018-06-23 NOTE — PROGRESS NOTES
0730 Bedside and Verbal shift change report given to Jerry RN  (oncoming nurse) by [address] (offgoing nurse). Report included the following information SBAR, Kardex, ED Summary, Procedure Summary, MAR, Recent Results and Cardiac Rhythm nsr.     0800 BP meds held until cardiology visits pt.     1930 Bedside and Verbal shift change report given to [address]  (oncoming nurse) by Jerry RN (offgoing nurse). Report included the following information SBAR, Kardex, ED Summary, MAR, Recent Results and Cardiac Rhythm nsr.

## 2018-06-23 NOTE — PROGRESS NOTES
Gastroenterology Progress Note    6/23/2018    Admit Date: 6/22/2018    Subjective: Follow up for: anemia with FOBT +, on anti platelets      No GI bleed. Hgb is stable      Current Facility-Administered Medications   Medication Dose Route Frequency    sodium chloride (NS) flush 5-10 mL  5-10 mL IntraVENous Q8H    sodium chloride (NS) flush 5-10 mL  5-10 mL IntraVENous PRN    acetaminophen (TYLENOL) suppository 650 mg  650 mg Rectal Q4H PRN    hydroCHLOROthiazide (HYDRODIURIL) tablet 25 mg  25 mg Oral DAILY AFTER BREAKFAST    aspirin chewable tablet 81 mg  81 mg Oral DAILY    insulin lispro (HUMALOG) injection   SubCUTAneous AC&HS    glucose chewable tablet 16 g  4 Tab Oral PRN    dextrose (D50W) injection syrg 12.5-25 g  12.5-25 g IntraVENous PRN    glucagon (GLUCAGEN) injection 1 mg  1 mg IntraMUSCular PRN    sodium chloride (NS) flush 5-10 mL  5-10 mL IntraVENous Q8H    sodium chloride (NS) flush 5-10 mL  5-10 mL IntraVENous PRN    [START ON 6/24/2018] amiodarone (CORDARONE) tablet 400 mg  400 mg Oral Q12H    [START ON 6/24/2018] mupirocin (BACTROBAN) 2 % ointment 1 g  1 g Both Nostrils BID    LORazepam (ATIVAN) tablet 1 mg  1 mg Oral Q6H PRN    losartan (COZAAR) tablet 50 mg  50 mg Oral DAILY    metoprolol tartrate (LOPRESSOR) tablet 50 mg  50 mg Oral Q12H    atorvastatin (LIPITOR) tablet 20 mg  20 mg Oral QHS        Objective:     Blood pressure 128/59, pulse 76, temperature 98.4 °F (36.9 °C), resp. rate 14, height 5' 3\" (1.6 m), weight 58.5 kg (128 lb 15.5 oz), SpO2 97 %. 06/21 1901 - 06/23 0700  In: 471.9 [I.V.:471.9]  Out: 8578 [Urine:2475]        Physical Examination:       General:Awake,   HEENT:  EOMI,  Chest:  CTA, .   Heart: S1, S2, RRR  GI: Soft, NT, ND    Data Review    Recent Results (from the past 24 hour(s))   GLUCOSE, POC    Collection Time: 06/22/18  8:21 PM   Result Value Ref Range    Glucose (POC) 210 (H) 65 - 100 mg/dL    Performed by Elizabeth James Lizette    GLUCOSE, POC    Collection Time: 06/22/18 10:21 PM   Result Value Ref Range    Glucose (POC) 190 (H) 65 - 100 mg/dL    Performed by Mason ARIZA    CBC W/O DIFF    Collection Time: 06/23/18  3:57 AM   Result Value Ref Range    WBC 10.5 4.1 - 11.1 K/uL    RBC 4.43 4.10 - 5.70 M/uL    HGB 10.4 (L) 12.1 - 17.0 g/dL    HCT 34.7 (L) 36.6 - 50.3 %    MCV 78.3 (L) 80.0 - 99.0 FL    MCH 23.5 (L) 26.0 - 34.0 PG    MCHC 30.0 30.0 - 36.5 g/dL    RDW 16.6 (H) 11.5 - 14.5 %    PLATELET 594 969 - 818 K/uL    MPV 11.0 8.9 - 12.9 FL    NRBC 0.0 0  WBC    ABSOLUTE NRBC 0.00 0.00 - 1.16 K/uL   METABOLIC PANEL, BASIC    Collection Time: 06/23/18  3:57 AM   Result Value Ref Range    Sodium 135 (L) 136 - 145 mmol/L    Potassium 4.9 3.5 - 5.1 mmol/L    Chloride 105 97 - 108 mmol/L    CO2 21 21 - 32 mmol/L    Anion gap 9 5 - 15 mmol/L    Glucose 169 (H) 65 - 100 mg/dL    BUN 17 6 - 20 MG/DL    Creatinine 1.09 0.70 - 1.30 MG/DL    BUN/Creatinine ratio 16 12 - 20      GFR est AA >60 >60 ml/min/1.73m2    GFR est non-AA >60 >60 ml/min/1.73m2    Calcium 9.4 8.5 - 10.1 MG/DL   GLUCOSE, POC    Collection Time: 06/23/18  7:37 AM   Result Value Ref Range    Glucose (POC) 327 (H) 65 - 100 mg/dL    Performed by Nino Mcclendon    GLUCOSE, POC    Collection Time: 06/23/18 11:34 AM   Result Value Ref Range    Glucose (POC) 409 (H) 65 - 100 mg/dL    Performed by Gwyn Walton      Recent Labs      06/23/18   0357  06/22/18   1353   WBC  10.5  7.7   HGB  10.4*  9.9*   HCT  34.7*  33.0*   PLT  221  241     Recent Labs      06/23/18   0357  06/22/18   1011   NA  135*  135*   K  4.9  5.8*   CL  105  107   CO2  21  21   BUN  17  13   CREA  1.09  0.91   GLU  169*  198*   CA  9.4  8.8     Recent Labs      06/22/18   1011   SGOT  29   AP  98   TP  7.7   ALB  4.0   GLOB  3.7     No results for input(s): INR, PTP, APTT in the last 72 hours.     No lab exists for component: INREXT   Recent Labs      06/22/18   1011   TIBC  436   PSAT  9*      No results found for: FOL, RBCF   No results for input(s): PH, PCO2, PO2 in the last 72 hours. No results for input(s): CPK, CKNDX, TROIQ in the last 72 hours. No lab exists for component: CPKMB  Lab Results   Component Value Date/Time    Cholesterol, total 171 06/22/2018 10:11 AM    HDL Cholesterol 40 06/22/2018 10:11 AM    LDL, calculated 101 (H) 06/22/2018 10:11 AM    Triglyceride 150 (H) 06/22/2018 10:11 AM    CHOL/HDL Ratio 4.3 06/22/2018 10:11 AM     No components found for: [place]  Lab Results   Component Value Date/Time    Color YELLOW/STRAW 05/06/2014 10:37 AM    Appearance CLEAR 05/06/2014 10:37 AM    Specific gravity 1.021 05/06/2014 10:37 AM    Specific gravity 1.020 05/03/2014 08:18 AM    pH (UA) 5.5 05/06/2014 10:37 AM    Protein 100 (A) 05/06/2014 10:37 AM    Glucose 500 (A) 05/06/2014 10:37 AM    Ketone NEGATIVE  05/06/2014 10:37 AM    Bilirubin NEGATIVE  05/06/2014 10:37 AM    Urobilinogen 1.0 05/06/2014 10:37 AM    Nitrites NEGATIVE  05/06/2014 10:37 AM    Leukocyte Esterase NEGATIVE  05/06/2014 10:37 AM    Epithelial cells FEW 05/06/2014 10:37 AM    Bacteria NEGATIVE  05/06/2014 10:37 AM    WBC 0-4 05/06/2014 10:37 AM    RBC 0-5 05/06/2014 10:37 AM        ROS: -CP, SOB, Dysuria, palpitations, cough. Assessment:    Active Problems:    NSTEMI (non-ST elevated myocardial infarction) ([facility] [address] (6/22/2018)      CAD, multiple vessel (6/22/2018)             Plan/Discussion:     · Mr Leyda Pérez is a 79 y/o M with ERIN and triple vessel disease. Likely needs CABG. ·  Colon/EGD once he is off Plavix for 5 days tentatively Tuesday. · His hemoglobin is stable. · Dr Cruz Oppenheim will see Monday and decide on timing of EGD/colonoscopy. · I will see on request this weekend. Thanks. ·        Signed By: Cher Gongora.  Jerris Sever, MD    6/23/2018  4:54 PM

## 2018-06-23 NOTE — PROGRESS NOTES
Problem: Falls - Risk of  Goal: *Absence of Falls  Document Laverne Fall Risk and appropriate interventions in the flowsheet. Outcome: Progressing Towards Goal  Fall Risk Interventions:            Medication Interventions: Assess postural VS orthostatic hypotension, Bed/chair exit alarm, Evaluate medications/consider consulting pharmacy, Patient to call before getting OOB, Teach patient to arise slowly, Utilize gait belt for transfers/ambulation    Elimination Interventions: Call light in reach, Elevated toilet seat, Patient to call for help with toileting needs, Toilet paper/wipes in reach, Toileting schedule/hourly rounds    History of Falls Interventions: Door open when patient unattended, Evaluate medications/consider consulting pharmacy        Problem: Pressure Injury - Risk of  Goal: *Prevention of pressure injury  Document Mike Scale and appropriate interventions in the flowsheet.    Outcome: Progressing Towards Goal  Pressure Injury Interventions:  Sensory Interventions: Assess changes in LOC, Avoid rigorous massage over bony prominences, Float heels, Keep linens dry and wrinkle-free, Maintain/enhance activity level, Minimize linen layers, Monitor skin under medical devices, Pressure redistribution bed/mattress (bed type)         Activity Interventions: Increase time out of bed, Pressure redistribution bed/mattress(bed type)    Mobility Interventions: Float heels, HOB 30 degrees or less, Pressure redistribution bed/mattress (bed type)    Nutrition Interventions: Document food/fluid/supplement intake, Offer support with meals,snacks and hydration    Friction and Shear Interventions: Apply protective barrier, creams and emollients, Feet elevated on foot rest, HOB 30 degrees or less, Lift sheet               Problem: Unstable angina/NSTEMI: Day of Admission/Day 1  Goal: *Hemodynamically stable  Outcome: Progressing Towards Goal  Episode of orthostatic hypotension

## 2018-06-23 NOTE — CONSULTS
[facility] [place]: Phillip Case  [phone]      HPI: Marisol Flores, a 78y.o. year-old who presents for evaluation of CAD. Has shortness of breaht/ fatigue and near syncope with exertion. Last event this am with getting up to chair. Presumed to be CAD related. Assoc with hypotension. Took 30 min to recover. Now doing fine in bad. Daughter at bedside, concerned about his safety at home. I doubt he will be able to go home tomorrow. Needs to stay in ICU on tele today. Up to chair again as tolerated. Assessment/Plan:  1. CAd severe, await CABg, holding plavix for surgery  2. GI bleed- hgb stable, colonoscopy Tuesday. 3. NSTEMI await cabg  4. DM glucose running high,   5. Anemia- mild, stable    He  has a past medical history of CAD (coronary artery disease) (11/10/2016); Deafness (10/28/2012); DM (diabetes mellitus) ([facility] [address] Elevated cholesterol; Hypertension; and NSTEMI (non-ST elevated myocardial infarction) ([facility] [address] (11/10/2016). Cardiovascular ROS: positive for - shortness of breath and near syncope  Respiratory ROS: no cough, shortness of breath, or wheezing  Neurological ROS: no TIA or stroke symptoms  All other systems negative except as above. PE  Vitals:    06/23/18 0800 06/23/18 0900 06/23/18 1000 06/23/18 1019   BP: 119/49 122/59 121/58 144/61   Pulse: 83 86 95 96   Resp: 15 15 23    Temp: 97.9 °F (36.6 °C)      SpO2: 100% 99% 97%    Weight:       Height:        Body mass index is 22.85 kg/(m^2).    General appearance - alert, well appearing, and in no distress  Mental status - affect appropriate to mood  Eyes - sclera anicteric, moist mucous membranes  Neck - supple, no significant adenopathy  Lymphatics - no  lymphadenopathy  Chest - clear to auscultation, no wheezes, rales or rhonchi  Heart - normal rate, regular rhythm, normal S1, S2, no murmurs, rubs, clicks or gallops  Abdomen - soft, nontender, nondistended, no masses or organomegaly  Back exam - full range of motion, no tenderness  Neurological - cranial nerves II through XII grossly intact, no focal deficit  Musculoskeletal - no muscular tenderness noted, normal strength  Extremities - peripheral pulses normal, no pedal edema  Skin - normal coloration  no rashes    Recent Labs:  Lab Results   Component Value Date/Time    Cholesterol, total 171 06/22/2018 10:11 AM    HDL Cholesterol 40 06/22/2018 10:11 AM    LDL, calculated 101 (H) 06/22/2018 10:11 AM    Triglyceride 150 (H) 06/22/2018 10:11 AM    CHOL/HDL Ratio 4.3 06/22/2018 10:11 AM     Lab Results   Component Value Date/Time    Creatinine 1.09 06/23/2018 03:57 AM     Lab Results   Component Value Date/Time    BUN 17 06/23/2018 03:57 AM     Lab Results   Component Value Date/Time    Potassium 4.9 06/23/2018 03:57 AM     Lab Results   Component Value Date/Time    Hemoglobin A1c 7.4 (H) 06/22/2018 01:53 PM     Lab Results   Component Value Date/Time    HGB 10.4 (L) 06/23/2018 03:57 AM     Lab Results   Component Value Date/Time    PLATELET 314 14/17/1411 03:57 AM       Reviewed:  Past Medical History:   Diagnosis Date    CAD (coronary artery disease) 11/10/2016    NSTEMI & 2 stents    Deafness 10/28/2012    DM (diabetes mellitus) ([facility] [address]     Elevated cholesterol     Hypertension     [facility] 11/10/2016     History   Smoking Status    Never Smoker   Smokeless Tobacco    Never Used     History   Alcohol Use    1.2 oz/week    1 Cans of beer, 1 Shots of liquor per week     No Known Allergies    Current Facility-Administered Medications   Medication Dose Route Frequency    sodium chloride (NS) flush 5-10 mL  5-10 mL IntraVENous Q8H    sodium chloride (NS) flush 5-10 mL  5-10 mL IntraVENous PRN    acetaminophen (TYLENOL) suppository 650 mg  650 mg Rectal Q4H PRN    hydroCHLOROthiazide (HYDRODIURIL) tablet 25 mg  25 mg Oral DAILY AFTER BREAKFAST    aspirin chewable tablet 81 mg  81 mg Oral DAILY    insulin lispro (HUMALOG) injection   SubCUTAneous AC&HS    glucose chewable tablet 16 g  4 Tab Oral PRN    dextrose (D50W) injection syrg 12.5-25 g  12.5-25 g IntraVENous PRN    glucagon (GLUCAGEN) injection 1 mg  1 mg IntraMUSCular PRN    sodium chloride (NS) flush 5-10 mL  5-10 mL IntraVENous Q8H    sodium chloride (NS) flush 5-10 mL  5-10 mL IntraVENous PRN    [START ON 6/24/2018] amiodarone (CORDARONE) tablet 400 mg  400 mg Oral Q12H    [START ON 6/24/2018] mupirocin (BACTROBAN) 2 % ointment 1 g  1 g Both Nostrils BID    LORazepam (ATIVAN) tablet 1 mg  1 mg Oral Q6H PRN    losartan (COZAAR) tablet 50 mg  50 mg Oral DAILY    metoprolol tartrate (LOPRESSOR) tablet 50 mg  50 mg Oral Q12H    atorvastatin (LIPITOR) tablet 20 mg  20 mg Oral QHS       DaiMD Guilherme Cortez [facility] [place]  [address] [facility] [address]  [address]

## 2018-06-23 NOTE — PROGRESS NOTES
Problem: Falls - Risk of  Goal: *Absence of Falls  Document Laverne Fall Risk and appropriate interventions in the flowsheet. Outcome: Progressing Towards Goal  Fall Risk Interventions:            Medication Interventions: Evaluate medications/consider consulting pharmacy, Assess postural VS orthostatic hypotension, Patient to call before getting OOB    Elimination Interventions: Call light in reach, Patient to call for help with toileting needs, Toileting schedule/hourly rounds    History of Falls Interventions: Door open when patient unattended, Evaluate medications/consider consulting pharmacy        Problem: Pressure Injury - Risk of  Goal: *Prevention of pressure injury  Document Mike Scale and appropriate interventions in the flowsheet.    Outcome: Progressing Towards Goal  Pressure Injury Interventions:  Sensory Interventions: Assess changes in LOC, Avoid rigorous massage over bony prominences, Float heels, Keep linens dry and wrinkle-free, Maintain/enhance activity level, Minimize linen layers, Monitor skin under medical devices, Pressure redistribution bed/mattress (bed type)         Activity Interventions: Pressure redistribution bed/mattress(bed type)    Mobility Interventions: HOB 30 degrees or less, Pressure redistribution bed/mattress (bed type)    Nutrition Interventions: Document food/fluid/supplement intake    Friction and Shear Interventions: Apply protective barrier, creams and emollients, Foam dressings/transparent film/skin sealants, HOB 30 degrees or less, Lift sheet

## 2018-06-23 NOTE — PROGRESS NOTES
0730 Bedside and Verbal shift change report given to Jacklyn Patel and Juanis Clark (oncoming nurse) by Nora Leon (offgoing nurse). Report included the following information SBAR, Kardex, ED Summary, Procedure Summary, Intake/Output, MAR, Accordion and Recent Results.      1154 Pt's  Dr. Caryle Chol paged, orders received for 10 units

## 2018-06-24 LAB
ANION GAP SERPL CALC-SCNC: 8 MMOL/L (ref 5–15)
BUN SERPL-MCNC: 26 MG/DL (ref 6–20)
BUN/CREAT SERPL: 20 (ref 12–20)
CALCIUM SERPL-MCNC: 8.9 MG/DL (ref 8.5–10.1)
CHLORIDE SERPL-SCNC: 108 MMOL/L (ref 97–108)
CO2 SERPL-SCNC: 22 MMOL/L (ref 21–32)
CREAT SERPL-MCNC: 1.3 MG/DL (ref 0.7–1.3)
ERYTHROCYTE [DISTWIDTH] IN BLOOD BY AUTOMATED COUNT: 17 % (ref 11.5–14.5)
GLUCOSE BLD STRIP.AUTO-MCNC: 207 MG/DL (ref 65–100)
GLUCOSE BLD STRIP.AUTO-MCNC: 208 MG/DL (ref 65–100)
GLUCOSE BLD STRIP.AUTO-MCNC: 257 MG/DL (ref 65–100)
GLUCOSE BLD STRIP.AUTO-MCNC: 349 MG/DL (ref 65–100)
GLUCOSE SERPL-MCNC: 195 MG/DL (ref 65–100)
HCT VFR BLD AUTO: 34.2 % (ref 36.6–50.3)
HGB BLD-MCNC: 10 G/DL (ref 12.1–17)
MCH RBC QN AUTO: 23.1 PG (ref 26–34)
MCHC RBC AUTO-ENTMCNC: 29.2 G/DL (ref 30–36.5)
MCV RBC AUTO: 79.2 FL (ref 80–99)
NRBC # BLD: 0 K/UL (ref 0–0.01)
NRBC BLD-RTO: 0 PER 100 WBC
PLATELET # BLD AUTO: 222 K/UL (ref 150–400)
PMV BLD AUTO: 10.7 FL (ref 8.9–12.9)
POTASSIUM SERPL-SCNC: 4.9 MMOL/L (ref 3.5–5.1)
RBC # BLD AUTO: 4.32 M/UL (ref 4.1–5.7)
SERVICE CMNT-IMP: ABNORMAL
SODIUM SERPL-SCNC: 138 MMOL/L (ref 136–145)
WBC # BLD AUTO: 8.2 K/UL (ref 4.1–11.1)

## 2018-06-24 PROCEDURE — 74011250637 HC RX REV CODE- 250/637: Performed by: PHYSICIAN ASSISTANT

## 2018-06-24 PROCEDURE — 85027 COMPLETE CBC AUTOMATED: CPT | Performed by: SPECIALIST

## 2018-06-24 PROCEDURE — 82962 GLUCOSE BLOOD TEST: CPT

## 2018-06-24 PROCEDURE — 80048 BASIC METABOLIC PNL TOTAL CA: CPT | Performed by: SPECIALIST

## 2018-06-24 PROCEDURE — 65660000000 HC RM CCU STEPDOWN

## 2018-06-24 PROCEDURE — 36415 COLL VENOUS BLD VENIPUNCTURE: CPT | Performed by: SPECIALIST

## 2018-06-24 PROCEDURE — 74011250637 HC RX REV CODE- 250/637: Performed by: SPECIALIST

## 2018-06-24 PROCEDURE — 74011636637 HC RX REV CODE- 636/637: Performed by: PHYSICIAN ASSISTANT

## 2018-06-24 RX ADMIN — INSULIN LISPRO 3 UNITS: 100 INJECTION, SOLUTION INTRAVENOUS; SUBCUTANEOUS at 08:14

## 2018-06-24 RX ADMIN — MUPIROCIN 1 G: 20 OINTMENT TOPICAL at 08:15

## 2018-06-24 RX ADMIN — ASPIRIN 81 MG 81 MG: 81 TABLET ORAL at 08:13

## 2018-06-24 RX ADMIN — METOPROLOL TARTRATE 50 MG: 50 TABLET ORAL at 20:18

## 2018-06-24 RX ADMIN — Medication 10 ML: at 14:00

## 2018-06-24 RX ADMIN — INSULIN LISPRO 3 UNITS: 100 INJECTION, SOLUTION INTRAVENOUS; SUBCUTANEOUS at 17:17

## 2018-06-24 RX ADMIN — AMIODARONE HYDROCHLORIDE 400 MG: 200 TABLET ORAL at 08:13

## 2018-06-24 RX ADMIN — Medication 10 ML: at 07:09

## 2018-06-24 RX ADMIN — METOPROLOL TARTRATE 50 MG: 50 TABLET ORAL at 09:20

## 2018-06-24 RX ADMIN — Medication 10 ML: at 21:33

## 2018-06-24 RX ADMIN — AMIODARONE HYDROCHLORIDE 400 MG: 200 TABLET ORAL at 20:18

## 2018-06-24 RX ADMIN — INSULIN LISPRO 3 UNITS: 100 INJECTION, SOLUTION INTRAVENOUS; SUBCUTANEOUS at 22:35

## 2018-06-24 RX ADMIN — MUPIROCIN 1 G: 20 OINTMENT TOPICAL at 17:17

## 2018-06-24 RX ADMIN — LORAZEPAM 1 MG: 1 TABLET ORAL at 22:30

## 2018-06-24 RX ADMIN — INSULIN LISPRO 7 UNITS: 100 INJECTION, SOLUTION INTRAVENOUS; SUBCUTANEOUS at 11:31

## 2018-06-24 RX ADMIN — ATORVASTATIN CALCIUM 20 MG: 20 TABLET, FILM COATED ORAL at 21:33

## 2018-06-24 NOTE — PROGRESS NOTES
Problem: Falls - Risk of  Goal: *Absence of Falls  Document Laverne Fall Risk and appropriate interventions in the flowsheet. Outcome: Progressing Towards Goal  Fall Risk Interventions:            Medication Interventions: Evaluate medications/consider consulting pharmacy, Patient to call before getting OOB    Elimination Interventions: Call light in reach, Toileting schedule/hourly rounds, Patient to call for help with toileting needs    History of Falls Interventions: Door open when patient unattended, Evaluate medications/consider consulting pharmacy        Problem: Pressure Injury - Risk of  Goal: *Prevention of pressure injury  Document Mike Scale and appropriate interventions in the flowsheet.    Outcome: Progressing Towards Goal  Pressure Injury Interventions:  Sensory Interventions: Assess changes in LOC, Avoid rigorous massage over bony prominences, Chair cushion, Float heels, Keep linens dry and wrinkle-free, Maintain/enhance activity level, Minimize linen layers, Monitor skin under medical devices, Pad between skin to skin, Pressure redistribution bed/mattress (bed type), Sit a 90-degree angle/use footstool if needed         Activity Interventions: Increase time out of bed, Pressure redistribution bed/mattress(bed type)    Mobility Interventions: HOB 30 degrees or less, Pressure redistribution bed/mattress (bed type)    Nutrition Interventions: Document food/fluid/supplement intake    Friction and Shear Interventions: Apply protective barrier, creams and emollients, Feet elevated on foot rest, HOB 30 degrees or less, Minimize layers, Sit at 90-degree angle

## 2018-06-24 NOTE — PROGRESS NOTES
0730 Bedside and Verbal shift change report given to Jerry RN  (oncoming nurse) by Shawn Hernandez (offgoing nurse). Report included the following information SBAR, Kardex, ED Summary, MAR, Recent Results and Cardiac Rhythm nsr. 1430 pt walked with primary nurse around the unit. No complaints of sob, chest pain, or dizzness with ambulation,. BP remains stable. 1930 Bedside and Verbal shift change report given to [address] [place] [address] Avenue  (oncoming nurse) by Jerry RN (offgoing nurse). Report included the following information SBAR, Kardex, ED Summary, Intake/Output, MAR, Recent Results and Cardiac Rhythm nsr.

## 2018-06-24 NOTE — PROGRESS NOTES
Problem: Falls - Risk of  Goal: *Absence of Falls  Document Laverne Fall Risk and appropriate interventions in the flowsheet. Outcome: Progressing Towards Goal  Fall Risk Interventions:            Medication Interventions: Assess postural VS orthostatic hypotension, Evaluate medications/consider consulting pharmacy, Patient to call before getting OOB, Teach patient to arise slowly    Elimination Interventions: Call light in reach, Elevated toilet seat, Patient to call for help with toileting needs, Toilet paper/wipes in reach, Toileting schedule/hourly rounds, Urinal in reach    History of Falls Interventions: Door open when patient unattended, Evaluate medications/consider consulting pharmacy        Problem: Pressure Injury - Risk of  Goal: *Prevention of pressure injury  Document Mike Scale and appropriate interventions in the flowsheet.    Outcome: Progressing Towards Goal  Pressure Injury Interventions:  Sensory Interventions: Assess changes in LOC, Avoid rigorous massage over bony prominences, Chair cushion, Float heels, Keep linens dry and wrinkle-free, Maintain/enhance activity level, Minimize linen layers, Monitor skin under medical devices, Pad between skin to skin, Pressure redistribution bed/mattress (bed type), Sit a 90-degree angle/use footstool if needed         Activity Interventions: Increase time out of bed, Pressure redistribution bed/mattress(bed type)    Mobility Interventions: HOB 30 degrees or less, Pressure redistribution bed/mattress (bed type)    Nutrition Interventions: Document food/fluid/supplement intake, Offer support with meals,snacks and hydration    Friction and Shear Interventions: Apply protective barrier, creams and emollients, Feet elevated on foot rest, HOB 30 degrees or less, Minimize layers, Sit at 90-degree angle

## 2018-06-24 NOTE — PROGRESS NOTES
0745 rcd report from Handy Sin and Cristela Brumfield RN  Pt is up to [facility], no BM  Then up in chair    0800 Bedside and Verbal shift change report given to RN   (oncoming nurse) by Jael Lee (offgoing nurse). Report included the following information SBAR, Kardex, Procedure Summary, Intake/Output, MAR, Recent Results and Cardiac Rhythm NSR.

## 2018-06-24 NOTE — PROGRESS NOTES
0730 Bedside and Verbal shift change report given to Madiha Schumacher and Joel Johnson (oncoming nurse) by Iesha Oakley (offgoing nurse). Report included the following information SBAR, Kardex, Procedure Summary, Intake/Output, MAR, Accordion and Recent Results.

## 2018-06-25 ENCOUNTER — TELEPHONE (OUTPATIENT)
Dept: ONCOLOGY | Age: 68
End: 2018-06-25

## 2018-06-25 VITALS
DIASTOLIC BLOOD PRESSURE: 54 MMHG | RESPIRATION RATE: 16 BRPM | WEIGHT: 133.38 LBS | BODY MASS INDEX: 23.63 KG/M2 | SYSTOLIC BLOOD PRESSURE: 171 MMHG | HEIGHT: 63 IN | HEART RATE: 65 BPM | OXYGEN SATURATION: 100 % | TEMPERATURE: 97.6 F

## 2018-06-25 LAB
ABO + RH BLD: NORMAL
ANION GAP SERPL CALC-SCNC: 11 MMOL/L (ref 5–15)
APPEARANCE UR: CLEAR
APTT PPP: 22.9 SEC (ref 22.1–32)
BACTERIA URNS QL MICRO: NEGATIVE /HPF
BILIRUB UR QL: NEGATIVE
BLOOD GROUP ANTIBODIES SERPL: NORMAL
BUN SERPL-MCNC: 34 MG/DL (ref 6–20)
BUN/CREAT SERPL: 24 (ref 12–20)
CALCIUM SERPL-MCNC: 9 MG/DL (ref 8.5–10.1)
CHLORIDE SERPL-SCNC: 106 MMOL/L (ref 97–108)
CO2 SERPL-SCNC: 20 MMOL/L (ref 21–32)
COLOR UR: ABNORMAL
CREAT SERPL-MCNC: 1.39 MG/DL (ref 0.7–1.3)
EPITH CASTS URNS QL MICRO: ABNORMAL /LPF
ERYTHROCYTE [DISTWIDTH] IN BLOOD BY AUTOMATED COUNT: 17.1 % (ref 11.5–14.5)
GLUCOSE BLD STRIP.AUTO-MCNC: 221 MG/DL (ref 65–100)
GLUCOSE BLD STRIP.AUTO-MCNC: 384 MG/DL (ref 65–100)
GLUCOSE SERPL-MCNC: 186 MG/DL (ref 65–100)
GLUCOSE UR STRIP.AUTO-MCNC: >1000 MG/DL
HCT VFR BLD AUTO: 34.1 % (ref 36.6–50.3)
HGB BLD-MCNC: 10 G/DL (ref 12.1–17)
HGB UR QL STRIP: NEGATIVE
HYALINE CASTS URNS QL MICRO: ABNORMAL /LPF (ref 0–5)
INR PPP: 1 (ref 0.9–1.1)
KETONES UR QL STRIP.AUTO: NEGATIVE MG/DL
LEUKOCYTE ESTERASE UR QL STRIP.AUTO: NEGATIVE
MCH RBC QN AUTO: 23.6 PG (ref 26–34)
MCHC RBC AUTO-ENTMCNC: 29.3 G/DL (ref 30–36.5)
MCV RBC AUTO: 80.4 FL (ref 80–99)
NITRITE UR QL STRIP.AUTO: NEGATIVE
NRBC # BLD: 0 K/UL (ref 0–0.01)
NRBC BLD-RTO: 0 PER 100 WBC
PH UR STRIP: 5.5 [PH] (ref 5–8)
PLATELET # BLD AUTO: 228 K/UL (ref 150–400)
PMV BLD AUTO: 11.1 FL (ref 8.9–12.9)
POTASSIUM SERPL-SCNC: 4.8 MMOL/L (ref 3.5–5.1)
PROT UR STRIP-MCNC: ABNORMAL MG/DL
PROTHROMBIN TIME: 10.4 SEC (ref 9–11.1)
RBC # BLD AUTO: 4.24 M/UL (ref 4.1–5.7)
RBC #/AREA URNS HPF: ABNORMAL /HPF (ref 0–5)
SERVICE CMNT-IMP: ABNORMAL
SERVICE CMNT-IMP: ABNORMAL
SODIUM SERPL-SCNC: 137 MMOL/L (ref 136–145)
SP GR UR REFRACTOMETRY: 1.01 (ref 1–1.03)
SPECIMEN EXP DATE BLD: NORMAL
THERAPEUTIC RANGE,PTTT: NORMAL SECS (ref 58–77)
UA: UC IF INDICATED,UAUC: ABNORMAL
UROBILINOGEN UR QL STRIP.AUTO: 1 EU/DL (ref 0.2–1)
WBC # BLD AUTO: 8.6 K/UL (ref 4.1–11.1)
WBC URNS QL MICRO: ABNORMAL /HPF (ref 0–4)

## 2018-06-25 PROCEDURE — 74011250637 HC RX REV CODE- 250/637: Performed by: PHYSICIAN ASSISTANT

## 2018-06-25 PROCEDURE — 85610 PROTHROMBIN TIME: CPT | Performed by: NURSE PRACTITIONER

## 2018-06-25 PROCEDURE — 86900 BLOOD TYPING SEROLOGIC ABO: CPT | Performed by: NURSE PRACTITIONER

## 2018-06-25 PROCEDURE — 82962 GLUCOSE BLOOD TEST: CPT

## 2018-06-25 PROCEDURE — 74011250637 HC RX REV CODE- 250/637: Performed by: SPECIALIST

## 2018-06-25 PROCEDURE — 80048 BASIC METABOLIC PNL TOTAL CA: CPT | Performed by: SPECIALIST

## 2018-06-25 PROCEDURE — 85730 THROMBOPLASTIN TIME PARTIAL: CPT | Performed by: NURSE PRACTITIONER

## 2018-06-25 PROCEDURE — 81001 URINALYSIS AUTO W/SCOPE: CPT | Performed by: NURSE PRACTITIONER

## 2018-06-25 PROCEDURE — 74011636637 HC RX REV CODE- 636/637: Performed by: PHYSICIAN ASSISTANT

## 2018-06-25 PROCEDURE — 36415 COLL VENOUS BLD VENIPUNCTURE: CPT | Performed by: SPECIALIST

## 2018-06-25 PROCEDURE — 74011636637 HC RX REV CODE- 636/637: Performed by: NURSE PRACTITIONER

## 2018-06-25 PROCEDURE — 94010 BREATHING CAPACITY TEST: CPT

## 2018-06-25 PROCEDURE — 85027 COMPLETE CBC AUTOMATED: CPT | Performed by: SPECIALIST

## 2018-06-25 PROCEDURE — 93970 EXTREMITY STUDY: CPT

## 2018-06-25 RX ORDER — METOPROLOL TARTRATE 50 MG/1
50 TABLET ORAL EVERY 12 HOURS
Qty: 60 TAB | Refills: 2 | Status: ON HOLD | OUTPATIENT
Start: 2018-06-25 | End: 2018-07-18

## 2018-06-25 RX ORDER — INSULIN GLARGINE 100 [IU]/ML
10 INJECTION, SOLUTION SUBCUTANEOUS DAILY
Status: DISCONTINUED | OUTPATIENT
Start: 2018-06-25 | End: 2018-06-25 | Stop reason: HOSPADM

## 2018-06-25 RX ORDER — INSULIN LISPRO 100 [IU]/ML
INJECTION, SOLUTION INTRAVENOUS; SUBCUTANEOUS
Status: DISCONTINUED | OUTPATIENT
Start: 2018-06-25 | End: 2018-06-25 | Stop reason: HOSPADM

## 2018-06-25 RX ORDER — ATORVASTATIN CALCIUM 20 MG/1
20 TABLET, FILM COATED ORAL
Qty: 30 TAB | Refills: 3 | Status: SHIPPED | OUTPATIENT
Start: 2018-06-25 | End: 2018-11-07 | Stop reason: SDUPTHER

## 2018-06-25 RX ADMIN — Medication 10 ML: at 06:36

## 2018-06-25 RX ADMIN — INSULIN LISPRO 3 UNITS: 100 INJECTION, SOLUTION INTRAVENOUS; SUBCUTANEOUS at 06:38

## 2018-06-25 RX ADMIN — MUPIROCIN 1 G: 20 OINTMENT TOPICAL at 08:55

## 2018-06-25 RX ADMIN — METOPROLOL TARTRATE 50 MG: 50 TABLET ORAL at 08:54

## 2018-06-25 RX ADMIN — ASPIRIN 81 MG 81 MG: 81 TABLET ORAL at 08:54

## 2018-06-25 RX ADMIN — INSULIN LISPRO 7 UNITS: 100 INJECTION, SOLUTION INTRAVENOUS; SUBCUTANEOUS at 12:29

## 2018-06-25 RX ADMIN — INSULIN GLARGINE 10 UNITS: 100 INJECTION, SOLUTION SUBCUTANEOUS at 12:29

## 2018-06-25 RX ADMIN — AMIODARONE HYDROCHLORIDE 400 MG: 200 TABLET ORAL at 08:54

## 2018-06-25 NOTE — INTERDISCIPLINARY ROUNDS
IDR/SLIDR Summary          Patient: Cass Saini MRN: [id number]    Age: 78 y.o. YOB: 1950 Room/Bed: [address] [place] [address]   Admit Diagnosis: CARDIAC CATH LAB  NSTEMI (non-ST elevated myocardial infarction) ([facility] [address]  CAD, multiple vessel  Principal Diagnosis: <principal problem not specified>   Goals: CABG  Readmission: NO  Quality Measure: CHF  VTE Prophylaxis: Mechanical  Influenza Vaccine screening completed? NO  Pneumococcal Vaccine screening completed? NO  Mobility needs: No   Nutrition plan:Yes  Consults:Respiratory and Case Management    Financial concerns:No  Escalated to CM? YES  RRAT Score: 16   Interventions:H2H  Testing due for pt today?  YES  LOS: 3 days Expected length of stay 9 days  Discharge plan: tbd   PCP: Whit Shaw MD  Transportation needs: No    Days before discharge:two or more days before discharge   Discharge disposition: Home or tbd    Signed:     Shani Campo RN  6/25/2018  1:39 AM

## 2018-06-25 NOTE — PROGRESS NOTES
Problem: Discharge Planning  Goal: *Discharge to safe environment  Outcome: Progressing Towards Goal  See CM note.     Iva Lebron MS

## 2018-06-25 NOTE — DIABETES MGMT
DTC Consult Note    Recommendations/ Comments: Consult received for pre/post hospital blood glucose management. Pt on Metformin 1000 mg BID, Glipizide 10 mg BID and Januvia 50 mg  at home. Noted Lantus 10 units ordered today. DTC to continue to follow and make additional recommendations as appropriate. Current hospital DM medication: Lantus 10 units, correction scale Humalog, normal sensitivity. Chart reviewed on [facility]. Patient is a 78 y.o. male with known diabetes on Metformin 1000 mg BID, Glipizide 10 mg BID and Januvia 50 mg  at home. A1c:   Lab Results   Component Value Date/Time    Hemoglobin A1c 7.4 (H) 06/22/2018 01:53 PM    Hemoglobin A1c 7.0 (H) 03/16/2018 09:28 AM       Recent Glucose Results: Lab Results   Component Value Date/Time     (H) 06/25/2018 03:43 AM    GLUCPOC 221 (H) 06/25/2018 06:38 AM    GLUCPOC 257 (H) 06/24/2018 10:32 PM    GLUCPOC 208 (H) 06/24/2018 05:12 PM        Lab Results   Component Value Date/Time    Creatinine 1.39 (H) 06/25/2018 03:43 AM     Estimated Creatinine Clearance: 42.1 mL/min (based on Cr of 1.39). Active Orders   Diet    DIET DIABETIC CONSISTENT CARB Regular        PO intake: Patient Vitals for the past 72 hrs:   % Diet Eaten   06/24/18 1200 50 %   06/24/18 0800 50 %       Will continue to follow as needed.     Thank you  Manjit Burrows RD, CDE

## 2018-06-25 NOTE — PROGRESS NOTES
0800: Report received from [address]: TRANSFER - OUT REPORT:    Verbal report given to CAROL RN(name) on [facility]  being transferred to [facility] (unit) for routine progression of care       Report consisted of patients Situation, Background, Assessment and   Recommendations(SBAR). Information from the following report(s) SBAR, Kardex, Procedure Summary, Intake/Output, MAR, Recent Results and Cardiac Rhythm SNR was reviewed with the receiving nurse. Lines:   Peripheral IV 06/22/18 Left Antecubital (Active)   Site Assessment Clean, dry, & intact 6/25/2018  8:00 AM   Phlebitis Assessment 0 6/25/2018  8:00 AM   Infiltration Assessment 0 6/25/2018  8:00 AM   Dressing Status Clean, dry, & intact 6/25/2018  8:00 AM   Dressing Type Transparent 6/25/2018  8:00 AM   Hub Color/Line Status Pink 6/25/2018  8:00 AM   Action Taken Open ports on tubing capped 6/25/2018  8:00 AM   Alcohol Cap Used Yes 6/25/2018  8:00 AM        Opportunity for questions and clarification was provided.       Patient transported with:   Registered Nurse  Tech

## 2018-06-25 NOTE — PROGRESS NOTES
Reason for Admission:   Patient referred for cardia eval by Dr. Kacie Cope for progressive DONALDSON and SOB. Patient has a history of CAD and NSTEMI in 2016. RRAT Score:     16             Do you (patient/family) have any concerns for transition/discharge? Patient denied any concerns. Patient is independent with self-care and ADLs/IADLs; uses no DME. Patient lives with his wife who will transport him home and provide assistance and support when he returns home. Patient verified PCP as Dr. Saroj Keating whom he reports he last seen 6 months ago. Patient prefers [facility]. Patient does not have an AMD but was informed that assistance is available should he want more information. Plan for utilizing home health:   Patient provided list of home health agencies and inpatient rehab hospitals. Patient will further discuss this with his wife but is agreeable to [facility] - INPATIENT. Likelihood of readmission? Low-Moderate. Transition of Care Plan:      TBD; patient will discharge home with wife's assistance and home health or inpatient rehab. CM will follow recommendations of medical staff and PT/OT.        Samantha Mcgee MS

## 2018-06-25 NOTE — PROGRESS NOTES
Assessment/Plan/Discussion:Cardiology Attending:     Patient seen on the day of progress note and examined  and agree with Advance Practice Provider (LIZ, NP,PA)  assessment and plans. Paul Gandhi is a 78 y.o. male   Comfortable  No complaints,   No weakness has been walking   Will have scope on Thursday  Home today   See dc list  MD ARELI [place]: Jose Gill  [phone]      HPI: Paul Gandhi, a 78y.o. year-old who presents for evaluation of CAD. He denies CP or SOB. -130s. HgB stable. Assessment/Plan:  1. CAD severe, await CABg, holding plavix for surgery  2. GI bleed- hgb stable, colonoscopy Tuesday. 3. NSTEMI await cabg  4. DM glucose running high,   5. Anemia- mild, stable    He  has a past medical history of CAD (coronary artery disease) (11/10/2016); Deafness (10/28/2012); DM (diabetes mellitus) ([facility] [address] Elevated cholesterol; Hypertension; and NSTEMI (non-ST elevated myocardial infarction) ([facility] [address] (11/10/2016). Cardiovascular ROS: negative for - shortness of breath and near syncope  Respiratory ROS: no cough, shortness of breath, or wheezing  Neurological ROS: no TIA or stroke symptoms  All other systems negative except as above. PE  Vitals:    06/25/18 0400 06/25/18 0500 06/25/18 0600 06/25/18 0800   BP: 147/54 154/76 132/51 133/45   Pulse: 65 69 64 66   Resp: 17 18 16 16   Temp: 97.9 °F (36.6 °C)   98.7 °F (37.1 °C)   SpO2: 99% 97% 97% 98%   Weight: 133 lb 6.1 oz (60.5 kg)      Height:        Body mass index is 23.63 kg/(m^2).      General appearance - alert, well appearing, and in no distress  Mental status - affect appropriate to mood  Eyes - sclera anicteric, moist mucous membranes  Neck - supple, no significant adenopathy  Chest - clear to auscultation, no wheezes, rales or rhonchi  Heart - normal rate, regular rhythm, normal S1, S2, no murmurs, rubs, clicks or gallops  Abdomen - soft, nontender, nondistended, no masses or organomegaly  Neurological - cranial nerves II through XII grossly intact, no focal deficit  Extremities - peripheral pulses normal, no pedal edema  Skin - normal coloration  no rashes    Recent Labs:  Lab Results   Component Value Date/Time    Cholesterol, total 171 06/22/2018 10:11 AM    HDL Cholesterol 40 06/22/2018 10:11 AM    LDL, calculated 101 (H) 06/22/2018 10:11 AM    Triglyceride 150 (H) 06/22/2018 10:11 AM    CHOL/HDL Ratio 4.3 06/22/2018 10:11 AM     Lab Results   Component Value Date/Time    Creatinine 1.39 (H) 06/25/2018 03:43 AM     Lab Results   Component Value Date/Time    BUN 34 (H) 06/25/2018 03:43 AM     Lab Results   Component Value Date/Time    Potassium 4.8 06/25/2018 03:43 AM     Lab Results   Component Value Date/Time    Hemoglobin A1c 7.4 (H) 06/22/2018 01:53 PM     Lab Results   Component Value Date/Time    HGB 10.0 (L) 06/25/2018 03:43 AM     Lab Results   Component Value Date/Time    PLATELET 139 65/72/4348 03:43 AM       Reviewed:  Past Medical History:   Diagnosis Date    CAD (coronary artery disease) 11/10/2016    NSTEMI & 2 stents    Deafness 10/28/2012    DM (diabetes mellitus) ([facility] [address]     Elevated cholesterol     Hypertension     NSTEMI (non-ST elevated myocardial infarction) ([facility] [address] 11/10/2016     History   Smoking Status    Never Smoker   Smokeless Tobacco    Never Used     History   Alcohol Use    1.2 oz/week    1 Cans of beer, 1 Shots of liquor per week     No Known Allergies    Current Facility-Administered Medications   Medication Dose Route Frequency    sodium chloride (NS) flush 5-10 mL  5-10 mL IntraVENous Q8H    sodium chloride (NS) flush 5-10 mL  5-10 mL IntraVENous PRN    acetaminophen (TYLENOL) suppository 650 mg  650 mg Rectal Q4H PRN    aspirin chewable tablet 81 mg  81 mg Oral DAILY    insulin lispro (HUMALOG) injection   SubCUTAneous AC&HS    glucose chewable tablet 16 g  4 Tab Oral PRN    dextrose (D50W) injection syrg 12.5-25 g  12.5-25 g IntraVENous PRN    glucagon (GLUCAGEN) injection 1 mg  1 mg IntraMUSCular PRN    sodium chloride (NS) flush 5-10 mL  5-10 mL IntraVENous Q8H    sodium chloride (NS) flush 5-10 mL  5-10 mL IntraVENous PRN    amiodarone (CORDARONE) tablet 400 mg  400 mg Oral Q12H    mupirocin (BACTROBAN) 2 % ointment 1 g  1 g Both Nostrils BID    LORazepam (ATIVAN) tablet 1 mg  1 mg Oral Q6H PRN    metoprolol tartrate (LOPRESSOR) tablet 50 mg  50 mg Oral Q12H    atorvastatin (LIPITOR) tablet 20 mg  20 mg Oral QHS       Lilian Flores PA-C  [facility] heart and [facility] [place]  [address] [facility] [address] [place] [address]

## 2018-06-25 NOTE — CDMP QUERY
Currently there is documentation of NSTEMI on this record, which may be challenged by an outside reviewer. Please document if this diagnosis has been ruled out or could this be further clarified as:     => History of NSTEMI (11/2016)   => Acute NSTEMI <3weeks of age - please document clinical indicators to support this diagnosis.  => Other explanation of clinical findings  => Clinically Undetermined (no explanation for clinical findings)    The medical record reflects the following clinical findings, treatment, and risk factors. Risk Factors:  history of NSTEMI from 11/2016    Clinical Indicators:    NSTEMI 11/2016; no current clinical indicators on this admission  6/22 EKG: Normal sinus rhythm; Nonspecific T wave abnormality when compared with ECG of 17-MAR-2018 05:53; No significant change was found   No current troponin on file  Trop from 11/2016 range 0.11 - 0.12    Treatment: prep for CBG and Plavix wash-out    Please clarify and document your clinical opinion in the progress notes and discharge summary including the definitive and/or presumptive diagnosis, (suspected or probable), related to the above clinical findings. Please include clinical findings supporting your diagnosis.     Thank you,  Fiona Hubbard, LORI  [phone]

## 2018-06-25 NOTE — PROGRESS NOTES
[address]  [address]   [phone]                GI PROGRESS NOTE  [facility]ad[facility] Freeman [facility]  Work Cell: [phone]      NAME:   Simi Pope   :    1950   MRN:    [id number]     Assessment/Plan   1. Anemia - with Hgb noted to be drifting down since 3/2018. He is without any GI symptoms or overt signs of bleeding. Would r/o potential GI source of blood loss. - Diet as tolerated  - Supportive management per primary team  - Hold Plavix  - Plan for EGD/colonoscopy Thursday once off Plavix for 5 days   2. CAD with multivessel disease  - Cardiology and Cardiothoracic surgery following   3. History of NSTEMI  4. Type 2 DM     Patient Active Problem List   Diagnosis Code    Dyslipidemia, goal LDL below 100 E78.5    Cramp of limb R25.2    Deafness H91.90    Essential hypertension with goal blood pressure less than 130/85 I10    Type 2 diabetes mellitus with hyperglycemia (HCC) E11.65    Nonrheumatic aortic valve stenosis I35.0    Bruit of right carotid artery R09.89    Preop general physical exam Z01.818    Colon cancer screening Z12.11    Dyspnea on exertion R06.09    Coronary artery disease involving native coronary artery of native heart without angina pectoris I25.10    Hypercholesteremia E78.00    Hypertension, essential I10    Statin intolerance Z78.9    NSTEMI (non-ST elevated myocardial infarction) (HCC) I21.4    CAD, multiple vessel I25.10       Subjective:     No complaints. Denies any pain. Tolerating diet. Hgb stable. No overt signs of bleeding.        Review of Systems    Constitutional: negative fever, negative chills, negative weight loss  Eyes:   negative visual changes  ENT:   negative sore throat, tongue or lip swelling  Respiratory:  negative cough, negative dyspnea  Cards:  negative for chest pain, palpitations, lower extremity edema  GI:   See HPI  :  negative for frequency, dysuria  Integument:  negative for rash and pruritus  Heme:  negative for easy bruising and gum/nose bleeding  Musculoskel: negative for myalgias, back pain and muscle weakness  Neuro: negative for headaches, dizziness, vertigo  Psych:  negative for feelings of anxiety, depression     Objective:     VITALS:   Last 24hrs VS reviewed since prior hospitalist progress note. Most recent are:  Visit Vitals    /54 (BP 1 Location: Right arm, BP Patient Position: At rest)    Pulse 65    Temp 97.6 °F (36.4 °C)    Resp 16    Ht 5' 3\" (1.6 m)    Wt 60.5 kg (133 lb 6.1 oz)    SpO2 100%    BMI 23.63 kg/m2     No intake or output data in the 24 hours ending 06/25/18 1252     PHYSICAL EXAM:  General   well developed, alert, in no acute distress  EENT  Normocephalic, Atraumatic, PERRLA, EOMI, sclera clear  Respiratory   Clear To Auscultation bilaterally - no wheezes, rales, rhonchi, or crackles  Cardiology  Regular Rate and Rythmn  - (+) murmur, no rubs or gallops  Abdominal  Soft, non-tender, non-distended, positive bowel sounds, no hepatosplenomegaly, no palpable mass  Extremities  No clubbing, cyanosis, or edema. Pulses intact. Neurological  No focal neurological deficits noted  Psychological  Oriented x 3. Normal affect.        Lab Data   Recent Results (from the past 12 hour(s))   CBC W/O DIFF    Collection Time: 06/25/18  3:43 AM   Result Value Ref Range    WBC 8.6 4.1 - 11.1 K/uL    RBC 4.24 4.10 - 5.70 M/uL    HGB 10.0 (L) 12.1 - 17.0 g/dL    HCT 34.1 (L) 36.6 - 50.3 %    MCV 80.4 80.0 - 99.0 FL    MCH 23.6 (L) 26.0 - 34.0 PG    MCHC 29.3 (L) 30.0 - 36.5 g/dL    RDW 17.1 (H) 11.5 - 14.5 %    PLATELET 683 404 - 971 K/uL    MPV 11.1 8.9 - 12.9 FL    NRBC 0.0 0  WBC    ABSOLUTE NRBC 0.00 0.00 - 6.85 K/uL   METABOLIC PANEL, BASIC    Collection Time: 06/25/18  3:43 AM   Result Value Ref Range    Sodium 137 136 - 145 mmol/L    Potassium 4.8 3.5 - 5.1 mmol/L    Chloride 106 97 - 108 mmol/L    CO2 20 (L) 21 - 32 mmol/L    Anion gap 11 5 - 15 mmol/L Glucose 186 (H) 65 - 100 mg/dL    BUN 34 (H) 6 - 20 MG/DL    Creatinine 1.39 (H) 0.70 - 1.30 MG/DL    BUN/Creatinine ratio 24 (H) 12 - 20      GFR est AA >60 >60 ml/min/1.73m2    GFR est non-AA 51 (L) >60 ml/min/1.73m2    Calcium 9.0 8.5 - 10.1 MG/DL   GLUCOSE, POC    Collection Time: 06/25/18  6:38 AM   Result Value Ref Range    Glucose (POC) 221 (H) 65 - 100 mg/dL    Performed by Dillan Burroughs    PROTHROMBIN TIME + INR    Collection Time: 06/25/18 11:11 AM   Result Value Ref Range    INR 1.0 0.9 - 1.1      Prothrombin time 10.4 9.0 - 11.1 sec   PTT    Collection Time: 06/25/18 11:11 AM   Result Value Ref Range    aPTT 22.9 22.1 - 32.0 sec    aPTT, therapeutic range     58.0 - 77.0 SECS   GLUCOSE, POC    Collection Time: 06/25/18 11:31 AM   Result Value Ref Range    Glucose (POC) 384 (H) 65 - 100 mg/dL    Performed by Paolo Lim          Medications: Reviewed    PMH/ reviewed - no change compared to H&P  Mid-Level Provider: Kentrell Norris NP   Date/Time:  6/25/2018  I have examined the patient. I have reviewed the chart and agree with the documentation recorded by the NP, including the assessment, treatment plan, and disposition.       Julio Rowell MD

## 2018-06-25 NOTE — CARDIO/PULMONARY
Cardiac Rehab: CABG educational folder is at the bedside of Evonnedottie Perez. Revisited to reinforce previous education and review plan of care. His CABG is now to be done next week with colonoscopy on 6/28/2018 and plavix washout continues. Educated using teach back method. Assessed patient's understanding of diagnosis and upcoming surgery. Reviewed general pre-op instructions including the need for thermometer and scale post-op, use of incentive spirometer, understanding of pain scale, and answered general post-surgery questions. Evonnedottie Castellanos was able to demonstrate proper use of incentive spirometer, achieving 1250 ml. Discussed instructions for family members during and immediately after surgery including, waiting areas, use of beeper and visiting hours in CVICU. Encouraged patient's consideration of participation in a Cardiac Rehab Program, after discharge, to assist with their risk modification and management. Evonne Castellanos has enrolled before after a stent and  verbalized understanding with questions answered. Will continue to follow.  Samina Yang RN

## 2018-06-25 NOTE — PROCEDURES
[facility]  *** FINAL REPORT ***    Name: Jess Hampton  MRN: [id number]    Inpatient  : 05 Oct 1950  HIS Order #: [id number]  [address] [facility] Visit #: [id number]  Date: 2018    TYPE OF TEST: Peripheral Venous Testing    REASON FOR TEST  Pre op heart    Right Leg:-  Deep venous thrombosis:           Not examined  Superficial venous thrombosis:    Not examined  Deep venous insufficiency:        Not examined  Superficial venous insufficiency: Not examined    Left Leg:-  Deep venous thrombosis:           Not examined  Superficial venous thrombosis:    Not examined  Deep venous insufficiency:        Not examined  Superficial venous insufficiency: Not examined    Vein Mapping:    Diam.   Depth  (mm)    Right Great Saphenous Vein:    High Thigh:      3.4    Mid Thigh:    Low Thigh:    Knee:    High Calf:       3.0    Low Calf: Ankle:           1.5    Right Small Saphenous Vein:    SPJ:             3.1    Mid Calf:        4.2    Ankle:           3.4    Giacomini:  Accessory saph.:  Novak :  Gutiérrez :    Left Great Saphenous Vein:    High Thigh:      5.4    Mid Thigh:       3.9    Low Thigh:       3.2    Knee:            3.2    High Calf:       3.1    Low Calf: Ankle:           3.0    Left Small Saphenous Vein:    SPJ:             4.5    Mid Calf:        3.2    Ankle:    Giacomini:  Accessory saph.:  Novak :  Gutiérrez :      INTERPRETATION/FINDINGS  PROCEDURE:  Color duplex ultrasound imaging of lower extremity veins. FINDINGS:          Right:  The common femoral, deep femoral, femoral, popliteal,  posterior tibial, peroneal, great saphenous, and small saphenous are  patent on color duplex imaging, and without evidence of thrombus. The   great saphenous ranges in diameter from 3.4 millimeters to 1.5  millimeters. The small saphenous ranges in diameter from 4.2  millimeters to 3.1 millimeters.          Left:  The common femoral, deep femoral, femoral, popliteal,  posterior tibial, peroneal, great saphenous, and small saphenous are  patent on color duplex imaging, and without evidence of thrombus. The   great saphenous ranges in diameter from 5.4 millimeters to 3.0  millimeters. The small saphenous ranges in diameter from 4.5  millimeters to 3.2 millimeters. IMPRESSION:  Vein segments that are patent with a diameter of 3  millimeters or greater are the right SSV and left GSV and SSV . No  evidence of right or left lower extremity vein thrombosis. ADDITIONAL COMMENTS    I have personally reviewed the data relevant to the interpretation of  this  study. TECHNOLOGIST: Leartis Nyhan.  Veronica Mccain  Signed: 06/25/2018 04:33 PM    PHYSICIAN: Mariela Potter MD  Signed: 06/27/2018 10:20 AM

## 2018-06-25 NOTE — TELEPHONE ENCOUNTER
Consult      Jessica Kendall 1950    Reason Anemia    Room [facility]

## 2018-06-25 NOTE — PROGRESS NOTES
[facility] FLOOR Progress Note    Admit Date: 2018        Procedure:  Subjective:   Pt resting. Denies CP. Afebrile, on RA. BS elevated. Objective:     Visit Vitals    /52 (BP 1 Location: Left arm, BP Patient Position: At rest)    Pulse 78    Temp 98 °F (36.7 °C)    Resp 18    Ht 5' 3\" (1.6 m)    Wt 133 lb 6.1 oz (60.5 kg)    SpO2 100%    BMI 23.63 kg/m2       Temp (24hrs), Av.2 °F (36.8 °C), Min:97.9 °F (36.6 °C), Max:98.7 °F (37.1 °C)      Last 24hr Input/Output:    Intake/Output Summary (Last 24 hours) at 18 1013  Last data filed at 18 1200   Gross per 24 hour   Intake              100 ml   Output                0 ml   Net              100 ml        EKG:   SR    Oxygen: RA    CXR:   None       Admission Weight: Last Weight   Weight: 139 lb (63 kg) Weight: 133 lb 6.1 oz (60.5 kg)       EXAM:      Lungs:   Clear to auscultation bilaterally. Heart:  Regular rate and rhythm, S1, S2 normal, soft systolic murmur, No click, rub or gallop. Abdomen:   Soft, non-tender. Bowel sounds normal. No masses,  No organomegaly. Extremities:  No edema. PPP. Neurologic:  Gross motor and sensory apparatus intact. Activity: w/ assistance     Diet:  Diabetic diet     Lab Data Reviewed:   Recent Labs      18   0343   WBC  8.6   HGB  10.0*   HCT  34.1*   PLT  228   CREA  1.39*         Assessment:     Active Problems:    NSTEMI (non-ST elevated myocardial infarction) ([facility] [address] (2018)      CAD, multiple vessel (2018)             Plan/Recommendations/Medical Decision Makin. CAD w/ hx of stents x 2 (2016):  Probable NSTEMi w/ + EST. No troponin noted. Preop workup and education ongoing. On asa, statin, BB. Plavix washout. Surgery date TBD. Cont preop amio. Needs PFTs, UA, coags, T & S.    Varicose veins noted, obtain LE duplex mapping. 2. DM:  Hgba1c 7.4. DTC consult. Holding metformin/januvia. Start lantus 10 units daily.   Cont BS ACHS, SSI per orders. 3. Anemia: stable H/H. Concern for GI bleed. GI following. Colonoscopy tues? 4. HTN:  On BB.   5. Hyperlipidemia: cont statin. 6. Renal insufficiency:  Creat up to 1.39. Monitor. Avoid nephrotoxic meds. Update:  Discharge per Cardiology. Plans to obtain colonscopy results Friday 6/29. Will see pt in our office on Tues, 7/3 at 11am.  Will schedule surgery once know colonoscopy results.        Signed By: Lamar Fraga NP

## 2018-06-25 NOTE — CDMP QUERY
There is documentation stating \"DM glucose running high\" on this chart. Could this be further specified as:    => DM Type 2 with Hyperglycemia in the setting of elevated serum glucose requiring SSI.  => Other explanation of clinical findings  => Clinically Undetermined (no explanation for clinical findings)    The medical record reflects the following clinical findings, treatment, and risk factors. Risk Factors:  DM2, elevated blood sugars    Clinical Indicators:    POC Glucose  6/23 409, 284, 296  6/24 207, 349, 208, 257  6/25 221    Treatment: Sliding Scale Insulin Protocol AC/HS    Please clarify and document your clinical opinion in the progress notes and discharge summary including the definitive and/or presumptive diagnosis, (suspected or probable), related to the above clinical findings. Please include clinical findings supporting your diagnosis.     Thank you,  Sae Church, RN  [phone]

## 2018-06-25 NOTE — TELEPHONE ENCOUNTER
Consult noted and appreciated. Attempted to see patient who is currently EVAN. Patient to be discharged today and has outpatient GI work up scheduled 6/28/18. Discussed with SCAR Espino, with Cardiology and we will see in the office in the next 1-2 weeks.  - please call to schedule appointment with Dr. Bridgett Harley as above.

## 2018-06-25 NOTE — PROGRESS NOTES
Spiritual Care Partner Volunteer visited patient in Rm 453 on 6/25/2018.   Documented by:  Chaplain Pompa MDiv, MS, [address] ([id number])

## 2018-06-25 NOTE — DISCHARGE SUMMARY
[facility] [address] [place] [address], a Division of [address] [place] [address] [facility]. Cardiology Discharge Summary     Patient ID:  Leoncio Deleon [id number] 89 y.o. 1950  PCP=Ailin Kahn MD     Admit Date: 6/22/2018  . .. Joy Modi Discharge Date: 6/25/2018   Admitting Physician: Adalgisa Power MD    ... Joy Modi Discharge Physician: Adalgisa Power MD  Discharge Condition: Good    Cardiology Procedures this Admission:  Diagnostic left heart catheterization  Consults: Cardiology, GI and Cardiac Surgery     Hospital Course:   Leoncio Deleon is a 78 y.o. male had silent MI in 2016 with stents to Circ and LAD  I have reviewed previous films and pt had some distal left main and ostial LAD and Circ dz  Pt underwent stress echo in office last week and had drop in bp with exercise ,and global HK at peak exercise with drop in EF from 55 to 45   With new EKG changes  No cp but admits some SOB  preop labs with lower Hgb at 9.3 unexplained     Cath today   Dampened even with 5F cath on engagement at 20-30 points on both left main and RCA ostial  Suspect ostial RCA, LM and Circ disease  Given stress echo findings, hx of silent Mi and diabetes   Recommend CABG after off Plavix for 5 days per CT surgery protocol   In meantime needs work up for anemia, will ask Heme and Gi to see    Held Plavix over weekend  Pt had some fatigue and SOB while getting out of chair night after cath and with hypotension taking 30 minutes to recover, Dr Ruddy Wilkes kept pt in CCU on 23rd  Hgb was 10.4  On 24th noted to have stopped HCT and ARB and ambulating  On 25th noted to be comfortable with no complaints  GI wants to wait until Plavix off for 5 days so pt dc with colonoscopy later this week  CT surgery saw pt and comfortable with plan      Physical Exam:  Patient Vitals for the past 12 hrs:   Temp Pulse Resp BP SpO2   06/25/18 1109 97.6 °F (36.4 °C) 65 16 171/54 100 %   06/25/18 0906 98 °F (36.7 °C) 78 18 146/52 100 %   06/25/18 0800 98.7 °F (37.1 °C) 66 16 133/45 98 %   06/25/18 0600 - 64 16 132/51 97 %   06/25/18 0500 - 69 18 154/76 97 %   06/25/18 0400 97.9 °F (36.6 °C) 65 17 147/54 99 %   06/25/18 0300 - (!) 58 17 (!) 125/30 95 %      Visit Vitals    /54 (BP 1 Location: Right arm, BP Patient Position: At rest)    Pulse 65    Temp 97.6 °F (36.4 °C)    Resp 16    Ht 5' 3\" (1.6 m)    Wt 133 lb 6.1 oz (60.5 kg)    SpO2 100%    BMI 23.63 kg/m2     HEENT=NC,AT  Neck: supple, symmetrical, trachea midline,  no JVD  Lungs: clear to auscultation bilaterally  Heart: regular rate and rhythm, S1, S2 normal, no murmurs, clicks, rubs or gallops  Abdomen: soft, non-tender. Extremities: no LE edema,HNeurologic: Grossly normal      Labs: Recent Labs      06/25/18   0343  06/24/18   0439  06/23/18   0357   WBC  8.6  8.2  10.5   HGB  10.0*  10.0*  10.4*   HCT  34.1*  34.2*  34.7*   PLT  228  222  221     Recent Labs      06/25/18   1111  06/25/18   0343  06/24/18   0439  06/23/18   0357   NA   --   137  138  135*   K   --   4.8  4.9  4.9   CL   --   106  108  105   CO2   --   20*  22  21   GLU   --   186*  195*  169*   BUN   --   34*  26*  17   CREA   --   1.39*  1.30  1.09   CA   --   9.0  8.9  9.4   INR  1.0   --    --    --        No results for input(s): TROIQ, CPK, CKMB in the last 72 hours.     Results for orders placed or performed during the hospital encounter of 03/17/18   EKG, 12 LEAD, INITIAL   Result Value Ref Range    Ventricular Rate 86 BPM    Atrial Rate 86 BPM    P-R Interval 118 ms    QRS Duration 94 ms    Q-T Interval 346 ms    QTC Calculation (Bezet) 414 ms    Calculated P Axis 55 degrees    Calculated R Axis 39 degrees    Calculated T Axis 99 degrees    Diagnosis       Normal sinus rhythm  Nonspecific T wave abnormality  When compared with ECG of 11-NOV-2016 14:49,  No significant change was found  Confirmed by Ayesha Townsend MD. ([id number]) on 3/17/2018 4:55:30 PM      XR Results (most recent):    Results from Hospital Encounter encounter on 06/22/18   XR CHEST PORT   Narrative EXAM:  XR CHEST PORT    INDICATION:  Coronary artery disease, diabetes, hypertension    COMPARISON:  11/10/2016    FINDINGS: A portable AP radiograph of the chest was obtained at 0955 hours. The  patient is on a cardiac monitor. The heart size is normal. The lungs demonstrate  low lung volumes but the lungs are clear. No pneumonia or pulmonary edema. Impression IMPRESSION:  1. Low lung volumes but the lungs are clear acute process. Disposition: home    Patient Instructions:   Current Discharge Medication List      START taking these medications    Details   atorvastatin (LIPITOR) 20 mg tablet Take 1 Tab by mouth nightly. Qty: 30 Tab, Refills: 3      metoprolol tartrate (LOPRESSOR) 50 mg tablet Take 1 Tab by mouth every twelve (12) hours. Qty: 60 Tab, Refills: 2      amiodarone (PACERONE) 400 mg tablet Take 1 Tab by mouth every twelve (12) hours. Qty: 30 Tab, Refills: 1         CONTINUE these medications which have CHANGED    Details   losartan (COZAAR) 50 mg tablet Take 1 Tab by mouth daily. Qty: 30 Tab, Refills: 0         CONTINUE these medications which have NOT CHANGED    Details   metFORMIN (GLUCOPHAGE) 1,000 mg tablet TAKE ONE TABLET BY MOUTH TWICE DAILY WITH MEALS  Qty: 180 Tab, Refills: 2      glipiZIDE (GLUCOTROL) 10 mg tablet TAKE ONE TABLET BY MOUTH TWICE DAILY  Qty: 180 Tab, Refills: 2      hydroCHLOROthiazide (HYDRODIURIL) 25 mg tablet Take 25 mg by mouth daily (after breakfast). JANUVIA 50 mg tablet TAKE ONE TABLET BY MOUTH ONCE DAILY  Qty: 90 Tab, Refills: 1    Associated Diagnoses: Type 2 diabetes mellitus with hyperglycemia, without long-term current use of insulin (HCC)      aspirin 81 mg tablet Take 81 mg by mouth daily.       lancets (ONE TOUCH DELICA) 33 gauge misc Check sugar once daily  Qty: 100 Lancet, Refills: 1      nitroglycerin (NITROSTAT) 0.4 mg SL tablet       glucose blood VI test strips (ONETOUCH VERIO) strip Check sugar once daily  Qty: 100 Strip, Refills: 1      therapeutic multivitamin (THERAGRAN) tablet Take 1 Tab by mouth daily. STOP taking these medications       clopidogrel (PLAVIX) 75 mg tab Comments:   Reason for Stopping:         pravastatin (PRAVACHOL) 40 mg tablet Comments:   Reason for Stopping:             Specialty Problems        Cardiology Problems    Coronary artery disease involving native coronary artery of native heart without angina pectoris        NSTEMI (non-ST elevated myocardial infarction) (HCC)        Hypercholesteremia        Nonrheumatic aortic valve stenosis        Hypertension, essential        Dyslipidemia, goal LDL below 100        Essential hypertension with goal blood pressure less than 130/85        CAD, multiple vessel            Reference discharge instructions provided by nursing for diet and activity.   Follow-up with = Future Appointments  Date Time Provider Mina Delgado   7/16/2018 8:40 AM Salvador Carlos MD [facility] SCHED     Signed:  Melonie Moore MD

## 2018-06-25 NOTE — PROGRESS NOTES
Cards  Pt feels well with no cp,sob or dizziness  Has been walking halls  VSS clear lungs no jvd, RRR smrg and no edema  Labs reviewed and are stable  Will do scope as OP with GI on Thursday and Ct surgery after  Can go home today  Will ask Heme to see pt today if possible or arrange as OP

## 2018-06-25 NOTE — PROGRESS NOTES
1930 Bedside shift change report given to Gavin Cuellar (oncoming nurse) by Courtenay Gowers (offgoing nurse). Report included the following information SBAR, Kardex, Procedure Summary, Intake/Output, MAR, Recent Results and Cardiac Rhythm NSR.     2230 Pt ambulated around the fourth floor. No SOB, tolerated well    0730  Bedside report given to LORI MOE    Problem: Falls - Risk of  Goal: *Absence of Falls  Document Laverne Fall Risk and appropriate interventions in the flowsheet. Outcome: Progressing Towards Goal  Fall Risk Interventions:            Medication Interventions: Evaluate medications/consider consulting pharmacy, Patient to call before getting OOB    Elimination Interventions: Call light in reach, Toileting schedule/hourly rounds, Patient to call for help with toileting needs    History of Falls Interventions: Door open when patient unattended, Evaluate medications/consider consulting pharmacy        Problem: Pressure Injury - Risk of  Goal: *Prevention of pressure injury  Document Mike Scale and appropriate interventions in the flowsheet.    Outcome: Progressing Towards Goal  Pressure Injury Interventions:  Sensory Interventions: Assess changes in LOC, Avoid rigorous massage over bony prominences, Chair cushion, Float heels, Keep linens dry and wrinkle-free, Maintain/enhance activity level, Minimize linen layers, Monitor skin under medical devices, Pad between skin to skin, Pressure redistribution bed/mattress (bed type), Sit a 90-degree angle/use footstool if needed         Activity Interventions: Increase time out of bed, Pressure redistribution bed/mattress(bed type)    Mobility Interventions: HOB 30 degrees or less, Pressure redistribution bed/mattress (bed type)    Nutrition Interventions: Document food/fluid/supplement intake    Friction and Shear Interventions: Apply protective barrier, creams and emollients, Feet elevated on foot rest, HOB 30 degrees or less, Minimize layers, Sit at 90-degree angle

## 2018-06-25 NOTE — PROGRESS NOTES
6996: TRANSFER - IN REPORT:    Verbal report received from [facility] (name) on Mark Qureshi  being received from CCU (unit) for routine progression of care      Report consisted of patients Situation, Background, Assessment and   Recommendations(SBAR). Information from the following report(s) SBAR, Kardex, Intake/Output, MAR, Recent Results, Med Rec Status and Cardiac Rhythm NSR was reviewed with the receiving nurse. Opportunity for questions and clarification was provided. Assessment completed upon patients arrival to unit and care assumed. 1530: I have reviewed discharge instructions with the patient. The patient verbalized understanding. DISCHARGE SUMMARY from Nurse    PATIENT INSTRUCTIONS:    After general anesthesia or intravenous sedation, for 24 hours or while taking prescription Narcotics:  · Limit your activities  · Do not drive and operate hazardous machinery  · Do not make important personal or business decisions  · Do  not drink alcoholic beverages  · If you have not urinated within 8 hours after discharge, please contact your surgeon on call. Report the following to your surgeon:  · Excessive pain, swelling, redness or odor of or around the surgical area  · Temperature over 100.5  · Nausea and vomiting lasting longer than 4 hours or if unable to take medications  · Any signs of decreased circulation or nerve impairment to extremity: change in color, persistent  numbness, tingling, coldness or increase pain  · Any questions    What to do at Home:  Recommended activity: Activity as tolerated, see discharge instructions    If you experience any of the following symptoms see discharge instructions, please follow up with Nerissa Barker MD.    *  Please give a list of your current medications to your Primary Care Provider.     *  Please update this list whenever your medications are discontinued, doses are      changed, or new medications (including over-the-counter products) are added.    *  Please carry medication information at all times in case of emergency situations. These are general instructions for a healthy lifestyle:    No smoking/ No tobacco products/ Avoid exposure to second hand smoke  Surgeon General's Warning:  Quitting smoking now greatly reduces serious risk to your health. Obesity, smoking, and sedentary lifestyle greatly increases your risk for illness    A healthy diet, regular physical exercise & weight monitoring are important for maintaining a healthy lifestyle    You may be retaining fluid if you have a history of heart failure or if you experience any of the following symptoms:  Weight gain of 3 pounds or more overnight or 5 pounds in a week, increased swelling in our hands or feet or shortness of breath while lying flat in bed. Please call your doctor as soon as you notice any of these symptoms; do not wait until your next office visit. Recognize signs and symptoms of STROKE:    F-face looks uneven    A-arms unable to move or move unevenly    S-speech slurred or non-existent    T-time-call 911 as soon as signs and symptoms begin-DO NOT go       Back to bed or wait to see if you get better-TIME IS BRAIN. Warning Signs of HEART ATTACK     Call 911 if you have these symptoms:   Chest discomfort. Most heart attacks involve discomfort in the center of the chest that lasts more than a few minutes, or that goes away and comes back. It can feel like uncomfortable pressure, squeezing, fullness, or pain.  Discomfort in other areas of the upper body. Symptoms can include pain or discomfort in one or both arms, the back, neck, jaw, or stomach.  Shortness of breath with or without chest discomfort.  Other signs may include breaking out in a cold sweat, nausea, or lightheadedness. Don't wait more than five minutes to call 911 - MINUTES MATTER! Fast action can save your life. Calling 911 is almost always the fastest way to get lifesaving treatment.  Emergency Medical Services staff can begin treatment when they arrive -- up to an hour sooner than if someone gets to the hospital by car. The discharge information has been reviewed with the patient. The patient verbalized understanding. Discharge medications reviewed with the patient and appropriate educational materials and side effects teaching were provided.   ___________________________________________________________________________________________________________________________________

## 2018-06-26 ENCOUNTER — PATIENT OUTREACH (OUTPATIENT)
Dept: CARDIOLOGY CLINIC | Age: 68
End: 2018-06-26

## 2018-06-26 NOTE — LETTER
6/26/2018 12:01 PM 
 
Mr. Dickerson Lax [address] [place] [id number] Mr and Mrs. Kendall -  
 
I am enclosing some information that I hope will be helpful - as we discussed - in getting ready for surgery - try to eat really healthy and keep your diabetes in good control. Glucose control with help the body to heal in a better way. Some things for you to watch -  
 
Daily morning weights - (helps to watch for holding fluid) and you are on a fluid pill -HCTZ. Check blood pressure and pulse at least daily - to help evaluate your cardiac medications - Charts enclosed to keep track - and bring them to doctor's appointments. For diabetes management - we need to see glucose readings - I have some information for you - Make sure to try to eat at pretty regular times -don't skip meals - drink lots of water - Your A1C was: 
Hemoglobin A1c 7.4 (H) 4.2 - 6.3 This is a higher average than we would like to see If you check your glucose - the doctor can help adjust your medications. Best times to check - (you can alternate - different times/ different days) Before meals - sometimes;  
2 hours after a meal (reading should ideally be 180 or less -) In the morning - before eating Bedtime - If the glucose is consistently high - let your primary doctor know for adjustments - The reason for checking is for adjustments -  
Diabetes control has a lot to do with healing - post surgery / and with blood vessel health , in general. 
 
Call me if I can help you Risa Fatima 
[phone]

## 2018-06-26 NOTE — PROGRESS NOTES
Hospital Discharge Follow-Up      Date/Time:  2018 11:42 AM    Patient was admitted to [facility] on  and discharged on 2018  for NSTEMI/ CAD/ post cath - recommendation for CABG - EGD for  - anemia. . The physician discharge summary was available at the time of outreach. Patient was contacted within 1 business days of discharge. 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 Method of communication with provider :chart routing, staff message    Inpatient RRAT score: 12  Was this a readmission? no   Patient stated reason for the readmission: n/a    Nurse Navigator (NN) contacted the family by telephone to perform post hospital discharge assessment. Verified name and  with family as identifiers. Provided introduction to self, and explanation of the Nurse Navigator role. Reviewed discharge instructions and red flags with family who verbalized understanding. Family given an opportunity to ask questions and does not have any further questions or concerns at this time. The family agrees to contact the PCP office for questions related to their healthcare. NN provided contact information for future reference. Disease Specific:  CAD/ pending cardiac surgery     Summary of patient's top problems:  1. CAD - ostial lesions/ pending CABG  2. Anemia - source undetermined - EGD   3. Diabetes management - pt was not checking glucose  Hemoglobin A1c 7.4 (H) 4.2 - 6.3     4.  Hypertension and cholesterol mgmt -     Home Health orders at [address]: n/a    Date of initial visit:n/a    Durable Medical Equipment ordered/company: [employer] [facility] Equipment received: n/a    Barriers to care? ineffective coping, lack of knowledge about disease/ pt is self employed and will be eager to get back to work - timing; determining source of anemia    Advance Care Planning:   Does patient have an Advance Directive:  not on file     Medication(s):   New Medications at Discharge: Amiodarone 400 mg q 12 hours, Lipitor 20 mg hs (replacing Pravachol)  Metoprolol Tartrate 50 mg q 12 hours -   Changed Medications at Discharge: Losartan 50 mg qd  Discontinued Medications at Discharge: Plavix, Pravachol - replaced by LIpitor    Medication reconciliation was performed with family, who verbalizes understanding of administration of home medications. There were no barriers to obtaining medications identified at this time. Referral to Pharm D needed: no     PCP/Specialist follow up: Future Appointments  Date Time Provider Mina Delgado   7/3/2018 11:00 AM MD Jeniffer Vitale [place] [id number]   7/16/2018 8:40 AM Jessica Nelson MD [facility] 39 Attends follow-up appointments as directed. 6/26/18  Pt has appt for EGD - Thursday 6/28 - for source of anemia  Call to [phone] - request call to pt's wife for details and instructions re: prep. Discussed with charge nurse - Allana Phoenix 6/26/18   ttk    Pt to see Dr. Luis Alberto Proctor 7/3 for pre-op surgical visit -   DAte and time for surgery pending - results of the above. Wife asking for surgery to be sooner than proposed 7/11 -    ttk       Knowledge and adherence of prescribed medication (ie. action, side effects, missed dose, etc.).            6/26  Medication changes reviewed with pt's wife  Adding Amiodarone 400 mg bid, Lipitro 20 mg hs, Metoprolol 50 mg bid/   Losartan increase to 50 mg every day. Stop Plavix and Pravachol. Kashif Chowdhury RN , CHFN, CCM  NN CAV Darshana Aravind  [phone]       Returns to baseline activity level. 6/26/18  Discussed with pt's wife -   1.  Daily am weights and why - when to call cardiology [phone]  2. Check bp and pulse - they have cuff - Losartan just increased. 3. Glucose checks- pt was not monitoring - discussed diabetes control and why this is significant pre-op.     ttgreyson Nino RN , [address], [facility]   [facility]  [phone]

## 2018-06-26 NOTE — PROCEDURES
[facility]  PULMONARY FUNCTION    Name:Marion COLBY [address]  MR#: [id number]  : 1950  ACCOUNT #: [de-identified]   DATE OF SERVICE: 2018    INDICATION:  Shortness of breath. Spirometry is performed. Spirometry reveals a mild reduction in vital capacity which appears secondary to a restrictive ventilatory defect. Lung volumes are recommended if clinically indicated. There is no obvious airflow obstruction. The flow volume loop is consistent with a restrictive pattern.       MD NICK Marie / [place]  D: 2018 13:41     T: 2018 15:08  JOB #: [id number]

## 2018-06-28 ENCOUNTER — ANESTHESIA (OUTPATIENT)
Dept: ENDOSCOPY | Age: 68
End: 2018-06-28
Payer: MEDICARE

## 2018-06-28 ENCOUNTER — HOSPITAL ENCOUNTER (OUTPATIENT)
Age: 68
Setting detail: OUTPATIENT SURGERY
Discharge: HOME | End: 2018-06-28
Attending: SPECIALIST | Admitting: SPECIALIST
Payer: MEDICARE

## 2018-06-28 ENCOUNTER — ANESTHESIA EVENT (OUTPATIENT)
Dept: ENDOSCOPY | Age: 68
End: 2018-06-28
Payer: MEDICARE

## 2018-06-28 VITALS
HEART RATE: 59 BPM | OXYGEN SATURATION: 100 % | WEIGHT: 143 LBS | BODY MASS INDEX: 25.34 KG/M2 | DIASTOLIC BLOOD PRESSURE: 68 MMHG | RESPIRATION RATE: 19 BRPM | TEMPERATURE: 97.6 F | HEIGHT: 63 IN | SYSTOLIC BLOOD PRESSURE: 138 MMHG

## 2018-06-28 LAB
GLUCOSE BLD STRIP.AUTO-MCNC: 154 MG/DL (ref 65–100)
GLUCOSE BLD STRIP.AUTO-MCNC: 44 MG/DL (ref 65–100)
GLUCOSE BLD STRIP.AUTO-MCNC: 48 MG/DL (ref 65–100)
H PYLORI FROM TISSUE: NEGATIVE
KIT LOT NO., HCLOLOT: NORMAL
NEGATIVE CONTROL: NEGATIVE
POSITIVE CONTROL: POSITIVE
SERVICE CMNT-IMP: ABNORMAL

## 2018-06-28 PROCEDURE — 88313 SPECIAL STAINS GROUP 2: CPT | Performed by: SPECIALIST

## 2018-06-28 PROCEDURE — 76060000033 HC ANESTHESIA 1 TO 1.5 HR: Performed by: SPECIALIST

## 2018-06-28 PROCEDURE — 77030010937 HC CLP LIG BSC -I: Performed by: SPECIALIST

## 2018-06-28 PROCEDURE — 87077 CULTURE AEROBIC IDENTIFY: CPT | Performed by: SPECIALIST

## 2018-06-28 PROCEDURE — 88341 IMHCHEM/IMCYTCHM EA ADD ANTB: CPT | Performed by: SPECIALIST

## 2018-06-28 PROCEDURE — 74011000250 HC RX REV CODE- 250

## 2018-06-28 PROCEDURE — 77030011640 HC PAD GRND REM COVD -A: Performed by: SPECIALIST

## 2018-06-28 PROCEDURE — 88305 TISSUE EXAM BY PATHOLOGIST: CPT | Performed by: SPECIALIST

## 2018-06-28 PROCEDURE — 77030009426 HC FCPS BIOP ENDOSC BSC -B: Performed by: SPECIALIST

## 2018-06-28 PROCEDURE — 77030027957 HC TBNG IRR ENDOGTR BUSS -B: Performed by: SPECIALIST

## 2018-06-28 PROCEDURE — 74011000250 HC RX REV CODE- 250: Performed by: ANESTHESIOLOGY

## 2018-06-28 PROCEDURE — 74011250637 HC RX REV CODE- 250/637: Performed by: SPECIALIST

## 2018-06-28 PROCEDURE — 76040000008: Performed by: SPECIALIST

## 2018-06-28 PROCEDURE — 82962 GLUCOSE BLOOD TEST: CPT

## 2018-06-28 PROCEDURE — 77030013996 HC SNR POLYP ENDOSC OCOA -B: Performed by: SPECIALIST

## 2018-06-28 PROCEDURE — 74011250636 HC RX REV CODE- 250/636

## 2018-06-28 PROCEDURE — 88342 IMHCHEM/IMCYTCHM 1ST ANTB: CPT | Performed by: SPECIALIST

## 2018-06-28 RX ORDER — PROPOFOL 10 MG/ML
INJECTION, EMULSION INTRAVENOUS AS NEEDED
Status: DISCONTINUED | OUTPATIENT
Start: 2018-06-28 | End: 2018-06-28 | Stop reason: HOSPADM

## 2018-06-28 RX ORDER — FENTANYL CITRATE 50 UG/ML
200 INJECTION, SOLUTION INTRAMUSCULAR; INTRAVENOUS
Status: DISCONTINUED | OUTPATIENT
Start: 2018-06-28 | End: 2018-06-28 | Stop reason: HOSPADM

## 2018-06-28 RX ORDER — SODIUM CHLORIDE 9 MG/ML
50 INJECTION, SOLUTION INTRAVENOUS CONTINUOUS
Status: DISCONTINUED | OUTPATIENT
Start: 2018-06-28 | End: 2018-06-28 | Stop reason: HOSPADM

## 2018-06-28 RX ORDER — FLUMAZENIL 0.1 MG/ML
0.2 INJECTION INTRAVENOUS
Status: DISCONTINUED | OUTPATIENT
Start: 2018-06-28 | End: 2018-06-28 | Stop reason: HOSPADM

## 2018-06-28 RX ORDER — PHENYLEPHRINE HCL IN 0.9% NACL 0.4MG/10ML
SYRINGE (ML) INTRAVENOUS AS NEEDED
Status: DISCONTINUED | OUTPATIENT
Start: 2018-06-28 | End: 2018-06-28 | Stop reason: HOSPADM

## 2018-06-28 RX ORDER — LIDOCAINE HYDROCHLORIDE 20 MG/ML
INJECTION, SOLUTION EPIDURAL; INFILTRATION; INTRACAUDAL; PERINEURAL AS NEEDED
Status: DISCONTINUED | OUTPATIENT
Start: 2018-06-28 | End: 2018-06-28 | Stop reason: HOSPADM

## 2018-06-28 RX ORDER — DEXTROSE 50 % IN WATER (D50W) INTRAVENOUS SYRINGE
25
Status: COMPLETED | OUTPATIENT
Start: 2018-06-28 | End: 2018-06-28

## 2018-06-28 RX ORDER — EPINEPHRINE 0.1 MG/ML
1 INJECTION INTRACARDIAC; INTRAVENOUS
Status: DISCONTINUED | OUTPATIENT
Start: 2018-06-28 | End: 2018-06-28 | Stop reason: HOSPADM

## 2018-06-28 RX ORDER — SODIUM CHLORIDE 9 MG/ML
INJECTION, SOLUTION INTRAVENOUS
Status: DISCONTINUED | OUTPATIENT
Start: 2018-06-28 | End: 2018-06-28 | Stop reason: HOSPADM

## 2018-06-28 RX ORDER — SODIUM CHLORIDE 0.9 % (FLUSH) 0.9 %
5-10 SYRINGE (ML) INJECTION AS NEEDED
Status: DISCONTINUED | OUTPATIENT
Start: 2018-06-28 | End: 2018-06-28 | Stop reason: HOSPADM

## 2018-06-28 RX ORDER — ATROPINE SULFATE 0.1 MG/ML
0.5 INJECTION INTRAVENOUS
Status: DISCONTINUED | OUTPATIENT
Start: 2018-06-28 | End: 2018-06-28 | Stop reason: HOSPADM

## 2018-06-28 RX ORDER — DEXTROMETHORPHAN/PSEUDOEPHED 2.5-7.5/.8
1.2 DROPS ORAL
Status: DISCONTINUED | OUTPATIENT
Start: 2018-06-28 | End: 2018-06-28 | Stop reason: HOSPADM

## 2018-06-28 RX ORDER — NALOXONE HYDROCHLORIDE 0.4 MG/ML
0.4 INJECTION, SOLUTION INTRAMUSCULAR; INTRAVENOUS; SUBCUTANEOUS
Status: DISCONTINUED | OUTPATIENT
Start: 2018-06-28 | End: 2018-06-28 | Stop reason: HOSPADM

## 2018-06-28 RX ORDER — MIDAZOLAM HYDROCHLORIDE 1 MG/ML
.25-1 INJECTION, SOLUTION INTRAMUSCULAR; INTRAVENOUS
Status: DISCONTINUED | OUTPATIENT
Start: 2018-06-28 | End: 2018-06-28 | Stop reason: HOSPADM

## 2018-06-28 RX ORDER — SODIUM CHLORIDE 0.9 % (FLUSH) 0.9 %
5-10 SYRINGE (ML) INJECTION EVERY 8 HOURS
Status: DISCONTINUED | OUTPATIENT
Start: 2018-06-28 | End: 2018-06-28 | Stop reason: HOSPADM

## 2018-06-28 RX ADMIN — PROPOFOL 20 MG: 10 INJECTION, EMULSION INTRAVENOUS at 11:27

## 2018-06-28 RX ADMIN — Medication 80 MCG: at 12:02

## 2018-06-28 RX ADMIN — PROPOFOL 20 MG: 10 INJECTION, EMULSION INTRAVENOUS at 11:38

## 2018-06-28 RX ADMIN — Medication 40 MCG: at 11:36

## 2018-06-28 RX ADMIN — PROPOFOL 20 MG: 10 INJECTION, EMULSION INTRAVENOUS at 11:43

## 2018-06-28 RX ADMIN — PROPOFOL 20 MG: 10 INJECTION, EMULSION INTRAVENOUS at 11:35

## 2018-06-28 RX ADMIN — PROPOFOL 20 MG: 10 INJECTION, EMULSION INTRAVENOUS at 11:29

## 2018-06-28 RX ADMIN — Medication 80 MCG: at 11:43

## 2018-06-28 RX ADMIN — PROPOFOL 20 MG: 10 INJECTION, EMULSION INTRAVENOUS at 11:48

## 2018-06-28 RX ADMIN — Medication 80 MCG: at 11:56

## 2018-06-28 RX ADMIN — PROPOFOL 20 MG: 10 INJECTION, EMULSION INTRAVENOUS at 11:25

## 2018-06-28 RX ADMIN — PROPOFOL 20 MG: 10 INJECTION, EMULSION INTRAVENOUS at 11:26

## 2018-06-28 RX ADMIN — SODIUM CHLORIDE: 9 INJECTION, SOLUTION INTRAVENOUS at 11:02

## 2018-06-28 RX ADMIN — PROPOFOL 20 MG: 10 INJECTION, EMULSION INTRAVENOUS at 12:02

## 2018-06-28 RX ADMIN — Medication 80 MCG: at 11:38

## 2018-06-28 RX ADMIN — PROPOFOL 20 MG: 10 INJECTION, EMULSION INTRAVENOUS at 11:33

## 2018-06-28 RX ADMIN — Medication 40 MCG: at 11:30

## 2018-06-28 RX ADMIN — Medication 80 MCG: at 11:59

## 2018-06-28 RX ADMIN — Medication 40 MCG: at 11:31

## 2018-06-28 RX ADMIN — Medication 40 MCG: at 11:49

## 2018-06-28 RX ADMIN — Medication 80 MCG: at 11:51

## 2018-06-28 RX ADMIN — LIDOCAINE HYDROCHLORIDE 80 MG: 20 INJECTION, SOLUTION EPIDURAL; INFILTRATION; INTRACAUDAL; PERINEURAL at 11:21

## 2018-06-28 RX ADMIN — DEXTROSE MONOHYDRATE 25 G: 25 INJECTION, SOLUTION INTRAVENOUS at 10:58

## 2018-06-28 RX ADMIN — Medication 80 MCG: at 12:04

## 2018-06-28 RX ADMIN — PROPOFOL 40 MG: 10 INJECTION, EMULSION INTRAVENOUS at 11:24

## 2018-06-28 RX ADMIN — PROPOFOL 20 MG: 10 INJECTION, EMULSION INTRAVENOUS at 11:56

## 2018-06-28 RX ADMIN — PROPOFOL 30 MG: 10 INJECTION, EMULSION INTRAVENOUS at 11:32

## 2018-06-28 RX ADMIN — PROPOFOL 50 MG: 10 INJECTION, EMULSION INTRAVENOUS at 11:23

## 2018-06-28 RX ADMIN — PROPOFOL 20 MG: 10 INJECTION, EMULSION INTRAVENOUS at 11:53

## 2018-06-28 RX ADMIN — PROPOFOL 20 MG: 10 INJECTION, EMULSION INTRAVENOUS at 11:46

## 2018-06-28 RX ADMIN — Medication 80 MCG: at 12:13

## 2018-06-28 RX ADMIN — Medication 40 MCG: at 11:46

## 2018-06-28 RX ADMIN — PROPOFOL 20 MG: 10 INJECTION, EMULSION INTRAVENOUS at 11:59

## 2018-06-28 RX ADMIN — Medication 80 MCG: at 12:07

## 2018-06-28 RX ADMIN — PROPOFOL 20 MG: 10 INJECTION, EMULSION INTRAVENOUS at 11:41

## 2018-06-28 RX ADMIN — PROPOFOL 20 MG: 10 INJECTION, EMULSION INTRAVENOUS at 11:51

## 2018-06-28 RX ADMIN — SODIUM CHLORIDE: 9 INJECTION, SOLUTION INTRAVENOUS at 12:12

## 2018-06-28 RX ADMIN — Medication 80 MCG: at 11:53

## 2018-06-28 RX ADMIN — SODIUM CHLORIDE: 9 INJECTION, SOLUTION INTRAVENOUS at 11:26

## 2018-06-28 NOTE — ANESTHESIA POSTPROCEDURE EVALUATION
Post-Anesthesia Evaluation and Assessment    Patient: Michell La MRN: [id number]  SSN: [id number]    YOB: 1950  Age: 78 y.o. Sex: male       Cardiovascular Function/Vital Signs  Visit Vitals    /53    Pulse 62    Temp 36.6 °C (97.8 °F)    Resp 12    Ht 5' 3\" (1.6 m)    Wt 64.9 kg (143 lb)    SpO2 100%    BMI 25.33 kg/m2       Patient is status post MAC anesthesia for Procedure(s):  ESOPHAGOGASTRODUODENOSCOPY (EGD)  COLONOSCOPY  ESOPHAGOGASTRODUODENAL (EGD) BIOPSY  ENDOSCOPIC POLYPECTOMY  RESOLUTION CLIP. Nausea/Vomiting: None    Postoperative hydration reviewed and adequate. Pain:  Pain Scale 1: Adult Nonverbal Pain Scale (06/28/18 1222)  Pain Intensity 1: 0 (06/28/18 1222)   Managed    Neurological Status: At baseline    Mental Status and Level of Consciousness: Arousable    Pulmonary Status:   O2 Device: Room air (06/28/18 1222)   Adequate oxygenation and airway patent    Complications related to anesthesia: None    Post-anesthesia assessment completed.  No concerns    Signed By: Spencer Sullivan MD     June 28, 2018

## 2018-06-28 NOTE — H&P
Colonoscopy History and Physical      The patient was seen and examined. Date of last colonoscopy: none, Polyps  No      The airway was assessed and documented. The problem list, past medical history, and medications were reviewed. Patient Active Problem List   Diagnosis Code    Dyslipidemia, goal LDL below 100 E78.5    Cramp of limb R25.2    Deafness H91.90    Essential hypertension with goal blood pressure less than 130/85 I10    Type 2 diabetes mellitus with hyperglycemia (HCC) E11.65    Nonrheumatic aortic valve stenosis I35.0    Bruit of right carotid artery R09.89    Preop general physical exam Z01.818    Colon cancer screening Z12.11    Dyspnea on exertion R06.09    Coronary artery disease involving native coronary artery of native heart without angina pectoris I25.10    Hypercholesteremia E78.00    Hypertension, essential I10    Statin intolerance Z78.9    NSTEMI (non-ST elevated myocardial infarction) (HCC) I21.4    CAD, multiple vessel I25.10     Social History     Social History    Marital status:      Spouse name: N/A    Number of children: N/A    Years of education: N/A     Occupational History    Not on file. Social History Main Topics    Smoking status: Never Smoker    Smokeless tobacco: Never Used    Alcohol use 1.2 oz/week     1 Cans of beer, 1 Shots of liquor per week    Drug use: No    Sexual activity: Not on file     Other Topics Concern    Not on file     Social History Narrative     Past Medical History:   Diagnosis Date    CAD (coronary artery disease) 11/10/2016    NSTEMI & 2 stents    Deafness 10/28/2012    DM (diabetes mellitus) ([facility] [address]     Elevated cholesterol     Hypertension     NSTEMI (non-ST elevated myocardial infarction) ([facility] [address] 11/10/2016         Prior to Admission Medications   Prescriptions Last Dose Informant Patient Reported? Taking?    JANUVIA 50 mg tablet 6/27/2018 at Unknown time  No Yes   Sig: TAKE ONE TABLET BY MOUTH ONCE DAILY amiodarone (PACERONE) 400 mg tablet 6/28/2018 at Unknown time  No Yes   Sig: Take 1 Tab by mouth every twelve (12) hours. aspirin 81 mg tablet 6/26/2018  Yes No   Sig: Take 81 mg by mouth daily. atorvastatin (LIPITOR) 20 mg tablet 6/28/2018 at Unknown time  No Yes   Sig: Take 1 Tab by mouth nightly. glipiZIDE (GLUCOTROL) 10 mg tablet 6/28/2018 at Unknown time  No Yes   Sig: TAKE ONE TABLET BY MOUTH TWICE DAILY   glucose blood VI test strips (ONETOUCH VERIO) strip   No No   Sig: Check sugar once daily   hydroCHLOROthiazide (HYDRODIURIL) 25 mg tablet 6/28/2018 at Unknown time  Yes Yes   Sig: Take 25 mg by mouth daily (after breakfast). lancets (ONE TOUCH DELICA) 33 gauge misc   No No   Sig: Check sugar once daily   losartan (COZAAR) 50 mg tablet 6/28/2018 at Unknown time  No Yes   Sig: Take 1 Tab by mouth daily. metFORMIN (GLUCOPHAGE) 1,000 mg tablet 6/28/2018 at Unknown time  No Yes   Sig: TAKE ONE TABLET BY MOUTH TWICE DAILY WITH MEALS   metoprolol tartrate (LOPRESSOR) 50 mg tablet 6/28/2018 at Unknown time  No Yes   Sig: Take 1 Tab by mouth every twelve (12) hours. nitroglycerin (NITROSTAT) 0.4 mg SL tablet Not Taking at Unknown time  Yes No   therapeutic multivitamin (THERAGRAN) tablet Not Taking at Unknown time  Yes No   Sig: Take 1 Tab by mouth daily. Facility-Administered Medications: None       The patient was seen and examined in the endoscopy suite. The airway was assessed and docuemented. The problem list and medications were reviewed. Chief complaint, history of present illness, and review of systems and Past medical History are positive for: Iron deficiency anemia and coronary artery disease      The heart, lungs and mental status were satisfactory for the administration of sedation and for the procedure. I discussed with the patient the objectives, risks, consequences and alternatives to the procedure.      Plan: Endoscopic procedure with sedation     Briseyda Retana MD 6/28/2018  11:17 AM

## 2018-06-28 NOTE — PROGRESS NOTES

## 2018-06-28 NOTE — PROCEDURES
[address]  [address]                 Colonoscopy Procedure Note    Indications:   See Preoperative Diagnosis above  Referring Physician: Ian Carrillo MD  Anesthesia/Sedation: MAC anesthesia Propofol  Endoscopist:  Dr. Jakob Delgado  Assistant:  Endoscopy Technician-1: Jm Sweet  Endoscopy RN-1: Mariangel Simon RN  Preoperative diagnosis: Anemia  Postoperative diagnosis: 1. Gastritis  2. Transverse Colon Polyp    Procedure in Detail:  Informed consent was obtained for the procedure, including sedation. Risks of perforation, hemorrhage, adverse drug reaction, and aspiration were discussed. The patient was placed in the left lateral decubitus position. Based on the pre-procedure assessment, including review of the patient's medical history, medications, allergies, and review of systems, he had been deemed to be an appropriate candidate for moderate sedation; he was therefore sedated with the medications listed above. The patient was monitored continuously with ECG tracing, pulse oximetry, blood pressure monitoring, and direct observations. A rectal examination was performed. The [id number] was inserted into the rectum and advanced under direct vision to the cecum, which was identified by the ileocecal valve and appendiceal orifice. The quality of the colonic preparation was good. A careful inspection was made as the colonoscope was withdrawn, including a retroflexed view of the rectum; findings and interventions are described below. Appropriate photodocumentation was obtained. Findings:  Rectum: normal  Sigmoid: normal  Descending Colon: normal  Transverse Colon: 15 mm sessile polyp removed with hot snare in fragments after submucosal injection of 2 ml of normal saline. Four hemoclips were placed at polypectomy site.   Ascending Colon: normal  Cecum: normal      Specimens:    polyp transverse colon    EBL: None    Complications: None; patient tolerated the procedure well. Recommendations:     - Await pathology.      - Colon in 1-2 yrs pending biopsy results, Take Pantoprazole as prescribed        Signed By: Salvador Black MD                        June 28, 2018

## 2018-06-28 NOTE — DISCHARGE INSTRUCTIONS
Naomi Moreno  [id number]  1950    COLON DISCHARGE INSTRUCTIONS  Discomfort:  Redness at IV site- apply warm compress to area; if redness or soreness persist- contact your physician  There may be a slight amount of blood passed from the rectum  Gaseous discomfort- walking, belching will help relieve any discomfort  You may not operate a vehicle for 12 hours  You may not engage in an occupation involving machinery or appliances for rest of today  You may not drink alcoholic beverages for at least 12 hours  Avoid making any critical decisions for at least 24 hour  DIET:   Regular diet. - however -  remember your colon is empty and a heavy meal will produce gas. Avoid these foods:  vegetables, fried / greasy foods, carbonated drinks for today. MEDICATIONS:      Regarding Aspirin or Nonsteroidal medications, please see below. ACTIVITY:  You may resume your normal daily activities it is recommended that you spend the remainder of the day resting -  avoid any strenuous activity. CALL M.D. ANY SIGN OF:  Increasing pain, nausea, vomiting  Abdominal distension (swelling)  New increased bleeding (oral or rectal)  Fever (chills)  Pain in chest area  Bloody discharge from nose or mouth  Shortness of breath    You may not  take any Advil, Aspirin, Ibuprofen, Motrin, Aleve, or Goodys for 5 days, ONLY  Tylenol as needed for pain. May resume Plavix in 5 days. Start Pantoprazole as prescribed.       Follow-up Instructions:   Call Dr. Botello Falling  Results of procedure / biopsy in 10 days  Telephone #  [phone]      DISCHARGE SUMMARY from Nurse    The following personal items collected during your admission are returned to you:   Dental Appliance: Dental Appliances: None  Vision: Visual Aid: None  Hearing Aid:    Jewelry:    Clothing:    Other Valuables:    Valuables sent to safe:

## 2018-06-28 NOTE — IP AVS SNAPSHOT
[address] [facility] 
[phone] Patient: Ayala Miller MRN: [id number] BSO:[id number] About your hospitalization You were admitted on:  June 28, 2018 You last received care in the:  [facility] ENDOSCOPY You were discharged on:  June 28, 2018 Why you were hospitalized Your primary diagnosis was:  Not on File Follow-up Information None Your Scheduled Appointments Monday July 02, 2018  1:00 PM EDT New Patient with Claritza Watson  [facility] at [facility]) [address] [facility] [address]  
[phone] Tuesday July 03, 2018 11:00 AM EDT Follow Up with Ashley Raya MD  
[facility] - [address] [facility] [address] [facility] [address] [phone]  
[phone] Monday July 16, 2018  8:40 AM EDT Medicare Physical with Joanie Renteria MD  
[facility]) [address]  
[phone] Discharge Orders None A check ritu indicates which time of day the medication should be taken. My Medications CONTINUE taking these medications Instructions Each Dose to Equal  
 Morning Noon Evening Bedtime  
 amiodarone 400 mg tablet Commonly known as:  Andrew Foster Your last dose was: Your next dose is: Take 1 Tab by mouth every twelve (12) hours. 400 mg  
    
   
   
   
  
 aspirin 81 mg tablet Your last dose was: Your next dose is: Take 81 mg by mouth daily. 81 mg  
    
   
   
   
  
 atorvastatin 20 mg tablet Commonly known as:  LIPITOR Your last dose was: Your next dose is: Take 1 Tab by mouth nightly. 20 mg  
    
   
   
   
  
 glipiZIDE 10 mg tablet Commonly known as:  Tino Lugo Your last dose was: Your next dose is: TAKE ONE TABLET BY MOUTH TWICE DAILY  
     
   
   
   
  
 glucose blood VI test strips strip Commonly known as:  Maria Luisa Jones Your last dose was: Your next dose is:    
   
   
 Check sugar once daily  
     
   
   
   
  
 hydroCHLOROthiazide 25 mg tablet Commonly known as:  HYDRODIURIL Your last dose was: Your next dose is: Take 25 mg by mouth daily (after breakfast). 25 mg  
    
   
   
   
  
 JANUVIA 50 mg tablet Generic drug:  SITagliptin Your last dose was: Your next dose is: TAKE ONE TABLET BY MOUTH ONCE DAILY  
     
   
   
   
  
 lancets 33 gauge Misc Commonly known as: One Touch [place] Lacy Your last dose was: Your next dose is:    
   
   
 Check sugar once daily  
     
   
   
   
  
 losartan 50 mg tablet Commonly known as:  COZAAR Your last dose was: Your next dose is: Take 1 Tab by mouth daily. 50 mg  
    
   
   
   
  
 metFORMIN 1,000 mg tablet Commonly known as:  GLUCOPHAGE Your last dose was: Your next dose is: TAKE ONE TABLET BY MOUTH TWICE DAILY WITH MEALS  
     
   
   
   
  
 metoprolol tartrate 50 mg tablet Commonly known as:  LOPRESSOR Your last dose was: Your next dose is: Take 1 Tab by mouth every twelve (12) hours. 50 mg  
    
   
   
   
  
 nitroglycerin 0.4 mg SL tablet Commonly known as:  NITROSTAT Your last dose was: Your next dose is:    
   
   
      
   
   
   
  
 therapeutic multivitamin tablet Commonly known as:  [facility] Your last dose was: Your next dose is: Take 1 Tab by mouth daily. 1 Tab Discharge Instructions Allen Timmons 
[id number] 
1950 COLON DISCHARGE INSTRUCTIONS Discomfort: Redness at IV site- apply warm compress to area; if redness or soreness persist- contact your physician There may be a slight amount of blood passed from the rectum Gaseous discomfort- walking, belching will help relieve any discomfort You may not operate a vehicle for 12 hours You may not engage in an occupation involving machinery or appliances for rest of today You may not drink alcoholic beverages for at least 12 hours Avoid making any critical decisions for at least 24 hour DIET: 
 Regular diet.  however -  remember your colon is empty and a heavy meal will produce gas. Avoid these foods:  vegetables, fried / greasy foods, carbonated drinks for today. MEDICATIONS: 
  
 Regarding Aspirin or Nonsteroidal medications, please see below. ACTIVITY: 
You may resume your normal daily activities it is recommended that you spend the remainder of the day resting -  avoid any strenuous activity. CALL M.D. ANY SIGN OF: Increasing pain, nausea, vomiting Abdominal distension (swelling) New increased bleeding (oral or rectal) Fever (chills) Pain in chest area Bloody discharge from nose or mouth Shortness of breath You may not  take any Advil, Aspirin, Ibuprofen, Motrin, Aleve, or Goodys for 5 days, ONLY  Tylenol as needed for pain. May resume Plavix in 5 days. Start Pantoprazole as prescribed. Follow-up Instructions: 
 Call Dr. Josephine Monk Results of procedure / biopsy in 10 days Telephone #  [phone] DISCHARGE SUMMARY from Nurse The following personal items collected during your admission are returned to you:  
Dental Appliance: Dental Appliances: None Vision: Visual Aid: None Hearing Aid:   
Jewelry:   
Clothing:   
Other Valuables:   
Valuables sent to safe:   
 
 
 
 
  
  
  
Introducing [facility]! Quinten Barber introduces [employer] patient portal. Now you can access parts of your medical record, email your doctor's office, and request medication refills online. 1. In your internet browser, go to [email or web address] 2. Click on the First Time User? Click Here link in the Sign In box. You will see the New Member Sign Up page. 3. Enter your [employer] Access Code exactly as it appears below. You will not need to use this code after youve completed the sign-up process. If you do not sign up before the expiration date, you must request a new code. · [employer] Access Code: [email or web address] Expires: 9/23/2018 10:49 AM 
 
4. Enter the last four digits of your Social Security Number (xxxx) and Date of Birth (mm/dd/yyyy) as indicated and click Submit. You will be taken to the next sign-up page. 5. Create a Kids [employer] Networkt ID. This will be your OmniVec login ID and cannot be changed, so think of one that is secure and easy to remember. 6. Create a [employer] password. You can change your password at any time. 7. Enter your Password Reset Question and Answer. This can be used at a later time if you forget your password. 8. Enter your e-mail address. You will receive e-mail notification when new information is available in [address] 9. Click Sign Up. You can now view and download portions of your medical record. 10. Click the Download Summary menu link to download a portable copy of your medical information. If you have questions, please visit the Frequently Asked Questions section of the [employer] website. Remember, [employer]Vec is NOT to be used for urgent needs. For medical emergencies, dial 911. Now available from your iPhone and Android! Introducing Kyle Shipley As a [facility] patient, I wanted to make you aware of our electronic visit tool called Kyle Shipley. [facility] 24/7 allows you to connect within minutes with a medical provider 24 hours a day, seven days a week via a mobile device or tablet or logging into a secure website from your computer. You can access Kyle Shipley from anywhere in the [place]. A virtual visit might be right for you when you have a simple condition and feel like you just dont want to get out of bed, or cant get away from work for an appointment, when your regular Cecilia Fishman provider is not available (evenings, weekends or holidays), or when youre out of town and need minor care. Electronic visits cost only $49 and if the [facility] [employer] 24/7 provider determines a prescription is needed to treat your condition, one can be electronically transmitted to a nearby pharmacy*. Please take a moment to enroll today if you have not already done so. The enrollment process is free and takes just a few minutes. To enroll, please download the [employer]/[place] dominick to your tablet or phone, or visit [email or web address] to enroll on your computer. And, as an [address] [place] [address] patient with a [employer] account, the results of your visits will be scanned into your electronic medical record and your primary care provider will be able to view the scanned results. We urge you to continue to see your regular Cecilia Fishman provider for your ongoing medical care. And while your primary care provider may not be the one available when you seek a Kyle Shipley virtual visit, the peace of mind you get from getting a real diagnosis real time can be priceless. For more information on Kyle Shipley, view our Frequently Asked Questions (FAQs) at [email or web address]. Sincerely, 
 
Duane Ramon MD 
Chief Medical Officer [facility] Jeannie Weinberg *:  certain medications cannot be prescribed via Kyle Shipley Providers Seen During Your Hospitalization Provider Specialty Primary office phone Justine Villalobos MD Gastroenterology [phone] Your Primary Care Physician (PCP) Primary Care Physician Office Phone Office Fax Edward Bonds [phone] You are allergic to the following No active allergies Recent Documentation Height Weight BMI Smoking Status 1.6 m 64.9 kg 25.33 kg/m2 Never Smoker Emergency Contacts Name Discharge Info Relation Home Work Mobile Susanna Kendall DISCHARGE CAREGIVER [3] Spouse [3] [phone] Patient Belongings The following personal items are in your possession at time of discharge: 
  Dental Appliances: None  Visual Aid: None   Hearing Aids/Status: With patient Please provide this summary of care documentation to your next provider. Signatures-by signing, you are acknowledging that this After Visit Summary has been reviewed with you and you have received a copy. Patient Signature:  ____________________________________________________________ Date:  ____________________________________________________________  
  
[facility] Provider Signature:  ____________________________________________________________ Date:  ____________________________________________________________

## 2018-06-28 NOTE — ANESTHESIA PREPROCEDURE EVALUATION
Anesthetic History               Review of Systems / Medical History  Patient summary reviewed, nursing notes reviewed and pertinent labs reviewed    Pulmonary                   Neuro/Psych              Cardiovascular    Hypertension          Past MI, CAD and CABG      Comments: EF 60%   GI/Hepatic/Renal               Comments: screen Endo/Other    Diabetes    Anemia     Other Findings   Comments: Hx anticoag         Physical Exam    Airway  Mallampati: I  TM Distance: > 6 cm  Neck ROM: normal range of motion   Mouth opening: Normal     Cardiovascular    Rhythm: regular  Rate: normal         Dental  No notable dental hx       Pulmonary  Breath sounds clear to auscultation               Abdominal         Other Findings            Anesthetic Plan    ASA: 3  Anesthesia type: MAC          Induction: Intravenous  Anesthetic plan and risks discussed with: Patient

## 2018-06-28 NOTE — PROGRESS NOTES
Resolution 360 Clip x 4 placed post polypectomy in the transverse colon per Dr. Kourtney Zamudio [id number]  REF [id number]  LOT [id number]  EXP 05/01/21

## 2018-06-28 NOTE — PROCEDURES
[address]                 Lorenzo Reynaga   :   1950   MRN:   [id number]     Date/Time:  2018 12:23 PM    Esophagogastroduodenoscopy (EGD) Procedure Note    :  Nicole Rangel MD    Referring Provider:  Evelin Enamorado MD    Anethesia/Sedation:  MAC anesthesia Propofol    Preoperative diagnosis: Anemia    Postoperative diagnosis: 1. Gastritis  2. Transverse Colon Polyp    Procedure Details     After infom consent was obtained for the procedure, with all risks and benefits of procedure explained the patient was taken to the endoscopy suite and placed in the left lateral decubitus position. Following sequential administration of sedation as per above, the [id number] gastroscope was inserted into the mouth and advanced under direct vision to second portion of the duodenum. A careful inspection was made as the gastroscope was withdrawn, including a retroflexed view of the proximal stomach; findings and interventions are described below. Findings:  Esophagus:normal  Stomach:Erosive gastritis in body and and antrum, ANNAMARIE and biopsies done  Duodenum/jejunum:normal      Therapies:  none    Specimens: ANNAMARIE, biopsies           EBL: None    Complications:   None; patient tolerated the procedure well. Impression:    See Postoperative diagnosis above    Recommendations:  -Acid suppression with a proton pump inhibitor. , -Await pathology. , -Await ANNAMARIE test result and treat for Helicobacter pylori if positive. Avoid NSAIDs.     Discharge disposition:  Home in the company of  when able to ambulate    Nicole Rangel MD

## 2018-06-28 NOTE — ROUTINE PROCESS
[facility]  1950  [id number]    Situation:  Verbal report received from: Juliette White RN  Procedure: Procedure(s):  ESOPHAGOGASTRODUODENOSCOPY (EGD)  COLONOSCOPY  ESOPHAGOGASTRODUODENAL (EGD) BIOPSY  ENDOSCOPIC POLYPECTOMY  RESOLUTION CLIP    Background:    Preoperative diagnosis: Anemia  Postoperative diagnosis: 1. Gastritis  2. Transverse Colon Polyp    :  Dr. Carla Rajput  Assistant(s): Endoscopy Technician-1: Dariusz Bernabe  Endoscopy RN-1: Elayne Abel RN    Specimens:   ID Type Source Tests Collected by Time Destination   1 : Gastric Body Biopsies Preservative   Will MD Emiliana 6/28/2018 1130 Pathology   2 : Transverse Colon Polyp Preservative   Will MD Emiliana 6/28/2018 1202 Pathology     H. Pylori  yes    Assessment:  Intra-procedure medications     Anesthesia gave intra-procedure sedation and medications, see anesthesia flow sheet yes    Intravenous fluids: NS@ KVO     Vital signs stable     Abdominal assessment: round and soft     Recommendation:  Discharge patient per MD order.   Family or Friend   Permission to share finding with family or friend yes

## 2018-06-29 DIAGNOSIS — I25.10 CAD, MULTIPLE VESSEL: Primary | ICD-10-CM

## 2018-07-02 ENCOUNTER — OFFICE VISIT (OUTPATIENT)
Dept: ONCOLOGY | Age: 68
End: 2018-07-02

## 2018-07-02 ENCOUNTER — TELEPHONE (OUTPATIENT)
Dept: FAMILY MEDICINE CLINIC | Age: 68
End: 2018-07-02

## 2018-07-02 VITALS
SYSTOLIC BLOOD PRESSURE: 159 MMHG | TEMPERATURE: 98.1 F | DIASTOLIC BLOOD PRESSURE: 70 MMHG | OXYGEN SATURATION: 98 % | WEIGHT: 140 LBS | HEIGHT: 63 IN | HEART RATE: 59 BPM | RESPIRATION RATE: 16 BRPM | BODY MASS INDEX: 24.8 KG/M2

## 2018-07-02 DIAGNOSIS — I10 ESSENTIAL HYPERTENSION WITH GOAL BLOOD PRESSURE LESS THAN 130/85: ICD-10-CM

## 2018-07-02 DIAGNOSIS — I21.4 NSTEMI (NON-ST ELEVATED MYOCARDIAL INFARCTION) (HCC): ICD-10-CM

## 2018-07-02 DIAGNOSIS — D64.9 ANEMIA, UNSPECIFIED TYPE: Primary | ICD-10-CM

## 2018-07-02 DIAGNOSIS — I25.10 CORONARY ARTERY DISEASE INVOLVING NATIVE CORONARY ARTERY OF NATIVE HEART WITHOUT ANGINA PECTORIS: ICD-10-CM

## 2018-07-02 DIAGNOSIS — E11.65 TYPE 2 DIABETES MELLITUS WITH HYPERGLYCEMIA, WITHOUT LONG-TERM CURRENT USE OF INSULIN (HCC): ICD-10-CM

## 2018-07-02 NOTE — PATIENT INSTRUCTIONS
Results for Brittney Coreas (MRN [id number]) as of 7/2/2018 13:16   Ref.  Range 5/3/2014 08:30 5/6/2014 10:37 5/26/2015 09:43 11/10/2016 14:32 3/17/2018 05:40 6/19/2018 10:18 6/22/2018 10:11 6/22/2018 13:53 6/23/2018 03:57 6/24/2018 04:39 6/25/2018 03:43   WBC Latest Ref Range: 4.1 - 11.1 K/uL 6.1 6.2 6.5 5.9 6.2 5.6 7.0 7.7 10.5 8.2 8.6   NRBC Latest Ref Range: 0  WBC     0.0  0.0 0.0 0.0 0.0 0.0   RBC Latest Ref Range: 4.10 - 5.70 M/uL 4.95 4.99 4.58 4.32 4.27 4.15 4.21 4.27 4.43 4.32 4.24   HGB Latest Ref Range: 12.1 - 17.0 g/dL 12.7 13.0 11.7 (L) 10.6 (L) 10.3 (L) 9.3 (L) 9.8 (L) 9.9 (L) 10.4 (L) 10.0 (L) 10.0 (L)   HCT Latest Ref Range: 36.6 - 50.3 % 39.5 40.3 37.1 (L) 33.7 (L) 34.8 (L) 31.8 (L) 32.9 (L) 33.0 (L) 34.7 (L) 34.2 (L) 34.1 (L)   MCV Latest Ref Range: 80.0 - 99.0 FL 79.8 (L) 80.8 81 78.0 (L) 81.5 77 (L) 78.1 (L) 77.3 (L) 78.3 (L) 79.2 (L) 80.4   MCH Latest Ref Range: 26.0 - 34.0 PG 25.7 (L) 26.1 25.5 (L) 24.5 (L) 24.1 (L) 22.4 (L) 23.3 (L) 23.2 (L) 23.5 (L) 23.1 (L) 23.6 (L)   MCHC Latest Ref Range: 30.0 - 36.5 g/dL 32.2 32.3 31.5 31.5 29.6 (L) 29.2 (L) 29.8 (L) 30.0 30.0 29.2 (L) 29.3 (L)   RDW Latest Ref Range: 11.5 - 14.5 % 14.4 14.8 (H) 15.6 (H) 16.4 (H) 16.2 (H) 16.8 (H) 16.5 (H) 16.5 (H) 16.6 (H) 17.0 (H) 17.1 (H)   PLATELET Latest Ref Range: 150 - 400 K/uL 211 213 237 216 228 257 218 241 221 222 228   MPV Latest Ref Range: 8.9 - 12.9 FL     11.2  11.0 10.8 11.0 10.7 11.1   NEUTROPHILS Latest Ref Range: 32 - 75 % 54 66 57 51 63 63  73      LYMPHOCYTES Latest Ref Range: 12 - 49 % 32 24  37 21   17      Lymphocytes Latest Ref Range: Not Estab. %   31   26        MONOCYTES Latest Ref Range: 5 - 13 % 8 7 7 6 10 7  7      EOSINOPHILS Latest Ref Range: 0 - 7 % 5 2 4 5 4 3  2      BASOPHILS Latest Ref Range: 0 - 1 % 1 1 1 1 1 1  1      IMMATURE GRANULOCYTES Latest Ref Range: 0.0 - 0.5 %   0  0 0  0      DF Latest Units:       AUTOMATED   AUTOMATED      ABSOLUTE NRBC Latest Ref Range: 0.00 - 0.01 K/uL     0.00  0.00 0.00 0.00 0.00 0.00   ABS. NEUTROPHILS Latest Ref Range: 1.8 - 8.0 K/UL 3.3 4.1 3.6 3.0 3.9 3.4  5.6      ABS. IMM. GRANS. Latest Ref Range: 0.00 - 0.04 K/UL   0.0  0.0 0.0  0.0      ABS. LYMPHOCYTES Latest Ref Range: 0.8 - 3.5 K/UL 1.9 1.5  2.2 1.3   1.3      Abs Lymphocytes Latest Ref Range: 0.7 - 3.1 x10E3/uL   2.0   1.5        ABS. MONOCYTES Latest Ref Range: 0.0 - 1.0 K/UL 0.5 0.4 0.5 0.3 0.6 0.4  0.6      ABS. EOSINOPHILS Latest Ref Range: 0.0 - 0.4 K/UL 0.3 0.1 0.3 0.3 0.3 0.2  0.2      ABS.  BASOPHILS Latest Ref Range: 0.0 - 0.1 K/UL 0.1 0.1 0.1 0.1 0.1 0.1  0.1      b

## 2018-07-02 NOTE — PROGRESS NOTES
[facility] [place] [facility] [place] [address] [address], [place]: [phone]  F: [phone]    Reason for Visit:   Diallo Arteaga is a 78 y.o. male who is seen in consultation at the request of Dr. Asia Driscoll for evaluation of anemia. Treatment History:   None from us    History of Present Illness:     Pt seen today for office consult for anemia. Pt is going for CABG. Results for Bernadette Mckeon (MRN [id number]) as of 7/2/2018 13:16   Ref.  Range 5/3/2014 08:30 5/6/2014 10:37 5/26/2015 09:43 11/10/2016 14:32 3/17/2018 05:40 6/19/2018 10:18 6/22/2018 10:11 6/22/2018 13:53 6/23/2018 03:57 6/24/2018 04:39 6/25/2018 03:43   WBC Latest Ref Range: 4.1 - 11.1 K/uL 6.1 6.2 6.5 5.9 6.2 5.6 7.0 7.7 10.5 8.2 8.6   NRBC Latest Ref Range: 0  WBC     0.0  0.0 0.0 0.0 0.0 0.0   RBC Latest Ref Range: 4.10 - 5.70 M/uL 4.95 4.99 4.58 4.32 4.27 4.15 4.21 4.27 4.43 4.32 4.24   HGB Latest Ref Range: 12.1 - 17.0 g/dL 12.7 13.0 11.7 (L) 10.6 (L) 10.3 (L) 9.3 (L) 9.8 (L) 9.9 (L) 10.4 (L) 10.0 (L) 10.0 (L)   HCT Latest Ref Range: 36.6 - 50.3 % 39.5 40.3 37.1 (L) 33.7 (L) 34.8 (L) 31.8 (L) 32.9 (L) 33.0 (L) 34.7 (L) 34.2 (L) 34.1 (L)   MCV Latest Ref Range: 80.0 - 99.0 FL 79.8 (L) 80.8 81 78.0 (L) 81.5 77 (L) 78.1 (L) 77.3 (L) 78.3 (L) 79.2 (L) 80.4   MCH Latest Ref Range: 26.0 - 34.0 PG 25.7 (L) 26.1 25.5 (L) 24.5 (L) 24.1 (L) 22.4 (L) 23.3 (L) 23.2 (L) 23.5 (L) 23.1 (L) 23.6 (L)   MCHC Latest Ref Range: 30.0 - 36.5 g/dL 32.2 32.3 31.5 31.5 29.6 (L) 29.2 (L) 29.8 (L) 30.0 30.0 29.2 (L) 29.3 (L)   RDW Latest Ref Range: 11.5 - 14.5 % 14.4 14.8 (H) 15.6 (H) 16.4 (H) 16.2 (H) 16.8 (H) 16.5 (H) 16.5 (H) 16.6 (H) 17.0 (H) 17.1 (H)   PLATELET Latest Ref Range: 150 - 400 K/uL 211 213 237 216 228 257 218 241 221 222 228   MPV Latest Ref Range: 8.9 - 12.9 FL     11.2  11.0 10.8 11.0 10.7 11.1   NEUTROPHILS Latest Ref Range: 32 - 75 % 54 66 57 51 63 63  73      LYMPHOCYTES Latest Ref Range: 12 - 49 % 32 24  37 21   17      Lymphocytes Latest Ref Range: Not Estab. %   31   26        MONOCYTES Latest Ref Range: 5 - 13 % 8 7 7 6 10 7  7      EOSINOPHILS Latest Ref Range: 0 - 7 % 5 2 4 5 4 3  2      BASOPHILS Latest Ref Range: 0 - 1 % 1 1 1 1 1 1  1      IMMATURE GRANULOCYTES Latest Ref Range: 0.0 - 0.5 %   0  0 0  0      DF Latest Units:       AUTOMATED   AUTOMATED      ABSOLUTE NRBC Latest Ref Range: 0.00 - 0.01 K/uL     0.00  0.00 0.00 0.00 0.00 0.00   ABS. NEUTROPHILS Latest Ref Range: 1.8 - 8.0 K/UL 3.3 4.1 3.6 3.0 3.9 3.4  5.6      ABS. IMM. GRANS. Latest Ref Range: 0.00 - 0.04 K/UL   0.0  0.0 0.0  0.0      ABS. LYMPHOCYTES Latest Ref Range: 0.8 - 3.5 K/UL 1.9 1.5  2.2 1.3   1.3      Abs Lymphocytes Latest Ref Range: 0.7 - 3.1 x10E3/uL   2.0   1.5        ABS. MONOCYTES Latest Ref Range: 0.0 - 1.0 K/UL 0.5 0.4 0.5 0.3 0.6 0.4  0.6      ABS. EOSINOPHILS Latest Ref Range: 0.0 - 0.4 K/UL 0.3 0.1 0.3 0.3 0.3 0.2  0.2      ABS.  BASOPHILS Latest Ref Range: 0.0 - 0.1 K/UL 0.1 0.1 0.1 0.1 0.1 0.1  0.1          Past Medical History:   Diagnosis Date    CAD (coronary artery disease) 11/10/2016    NSTEMI & 2 stents    Deafness 10/28/2012    DM [facility]diabetes mellitus) [facility] [address]     Elevated cholesterol     Hypertension     NSTEMI (non-ST elevated myocardial infarction) ([facility] [address] 11/10/2016      Past Surgical History:   Procedure Laterality Date    CARDIAC SURG PROCEDURE UNLIST  11/11/2016    2 stents    COLONOSCOPY N/A 6/28/2018    COLONOSCOPY performed by Karen Max MD at [address] [place] [address] HX APPENDECTOMY        Social History   Substance Use Topics    Smoking status: Never Smoker    Smokeless tobacco: Never Used    Alcohol use 1.2 oz/week     1 Cans of beer, 1 Shots of liquor per week      Family History   Problem Relation Age of Onset    Heart Disease Father     Hypertension Mother     Elevated Lipids Brother     Elevated Lipids Brother      Current Outpatient Prescriptions   Medication Sig    atorvastatin (LIPITOR) 20 mg tablet Take 1 Tab by mouth nightly.  metoprolol tartrate (LOPRESSOR) 50 mg tablet Take 1 Tab by mouth every twelve (12) hours.  amiodarone (PACERONE) 400 mg tablet Take 1 Tab by mouth every twelve (12) hours.  losartan (COZAAR) 50 mg tablet Take 1 Tab by mouth daily.  metFORMIN (GLUCOPHAGE) 1,000 mg tablet TAKE ONE TABLET BY MOUTH TWICE DAILY WITH MEALS    glipiZIDE (GLUCOTROL) 10 mg tablet TAKE ONE TABLET BY MOUTH TWICE DAILY    hydroCHLOROthiazide (HYDRODIURIL) 25 mg tablet Take 25 mg by mouth daily (after breakfast).  JANUVIA 50 mg tablet TAKE ONE TABLET BY MOUTH ONCE DAILY    lancets (ONE TOUCH DELICA) 33 gauge misc Check sugar once daily    glucose blood VI test strips (ONETOUCH VERIO) strip Check sugar once daily    nitroglycerin (NITROSTAT) 0.4 mg SL tablet     therapeutic multivitamin (THERAGRAN) tablet Take 1 Tab by mouth daily.  aspirin 81 mg tablet Take 81 mg by mouth daily. No current facility-administered medications for this visit. No Known Allergies     Review of Systems: A complete review of systems was obtained, negative except as described above. Physical Exam:     Visit Vitals    Ht 5' 3\" (1.6 m)     ECOG PS: 0  General: No distress  Eyes:  anicteric sclerae  HENT: Atraumatic, OP clear  Neck: Supple  Respiratory: CTAB, normal respiratory effort  CV: Normal rate, regular rhythm, no murmurs, no peripheral edema  GI: Soft, nontender, nondistended, no masses, no hepatomegaly, no splenomegaly  MS: Normal gait and station. Skin: Normal temperature, turgor, and texture. Psych: Alert, oriented, appropriate affect, normal judgment/insight    Results:     Lab Results   Component Value Date/Time    WBC 8.6 06/25/2018 03:43 AM    HGB 10.0 (L) 06/25/2018 03:43 AM    HCT 34.1 (L) 06/25/2018 03:43 AM    PLATELET 433 02/94/5741 03:43 AM    MCV 80.4 06/25/2018 03:43 AM    ABS.  NEUTROPHILS 5.6 06/22/2018 01:53 PM     Lab Results   Component Value Date/Time Sodium 137 06/25/2018 03:43 AM    Potassium 4.8 06/25/2018 03:43 AM    Chloride 106 06/25/2018 03:43 AM    CO2 20 (L) 06/25/2018 03:43 AM    Glucose 186 (H) 06/25/2018 03:43 AM    BUN 34 (H) 06/25/2018 03:43 AM    Creatinine 1.39 (H) 06/25/2018 03:43 AM    GFR est AA >60 06/25/2018 03:43 AM    GFR est non-AA 51 (L) 06/25/2018 03:43 AM    Calcium 9.0 06/25/2018 03:43 AM    Glucose (POC) 154 (H) 06/28/2018 11:17 AM     Lab Results   Component Value Date/Time    Bilirubin, total 0.4 06/22/2018 10:11 AM    ALT (SGPT) 40 06/22/2018 10:11 AM    AST (SGOT) 29 06/22/2018 10:11 AM    Alk. phosphatase 98 06/22/2018 10:11 AM    Protein, total 7.7 06/22/2018 10:11 AM    Albumin 4.0 06/22/2018 10:11 AM    Globulin 3.7 06/22/2018 10:11 AM         Records reviewed and summarized above. Pathology report(s) reviewed above. Radiology report(s) reviewed above. Assessment/PLAN:     1)  Anemia chronic mild with hx of low iron level and gastritis on EGD. Records reviewed. Reviewed history with pt today. Pt denies anemia or blood problems in past.   Pt is to go to CABG soon. GI did EGD and colo and pt had gastritis and polyp and path is pending. ? Gastritis causing iron def. Will recheck labs with smear review today. 2) CAD/ MI for CABG. Per CT surgery. 3) DM/HTN per PCP. Call if questions  F/u here prn if labs good. Will call pt with lab results. I appreciate the opportunity to participate in Mr. Sarah Kendall's care.     Signed By: Slim Villalpando DO

## 2018-07-02 NOTE — TELEPHONE ENCOUNTER
Calling on behalf of patient from [facility] to inform office  and provider that patient will be having surgery on July 9th @ 7am under Dr. Hunter Sevilla call back [phone]
Courtesy call to our office about surgery that is being performed on 7/9/18.
no

## 2018-07-03 ENCOUNTER — TELEPHONE (OUTPATIENT)
Dept: ONCOLOGY | Age: 68
End: 2018-07-03

## 2018-07-03 ENCOUNTER — OFFICE VISIT (OUTPATIENT)
Dept: CARDIOTHORACIC SURGERY | Age: 68
End: 2018-07-03

## 2018-07-03 DIAGNOSIS — I25.10 CORONARY ARTERY DISEASE INVOLVING NATIVE CORONARY ARTERY OF NATIVE HEART WITHOUT ANGINA PECTORIS: Primary | ICD-10-CM

## 2018-07-03 RX ORDER — MUPIROCIN 20 MG/G
OINTMENT TOPICAL 2 TIMES DAILY
Qty: 22 G | Refills: 0 | Status: SHIPPED | OUTPATIENT
Start: 2018-07-07 | End: 2018-07-18

## 2018-07-03 RX ORDER — CHLORHEXIDINE GLUCONATE 1.2 MG/ML
15 RINSE ORAL 2 TIMES DAILY
Qty: 473 ML | Refills: 0 | Status: SHIPPED | OUTPATIENT
Start: 2018-07-07 | End: 2018-07-18

## 2018-07-03 NOTE — TELEPHONE ENCOUNTER
Jose Parham request a call back from the practice in regards to the pt's visit today. She stated that she is unhappy with the service that was received to. Best contact number is [phone].   Message was taken by bert the answering service

## 2018-07-03 NOTE — H&P
CSS   History and Physical    Subjective:      Janis Reyes is a 78 y.o. male  Who was referred for CAD by Dr. Luis Enrique Hayes. Pt's PMH includes CAD (Prior stents), DM2, HTN, hyperlipidemia, renal insufficiency. The patient has been experiencing progressive DONALDSON over the last several months. The SOB has been getting worse and occurs with less activity. The SOB resolves with rest. The pt has a history of stents to LAD and CX in 2016 for which he takes chronic plavix, last dose 6/21/18. He had a positive EST which led to cardiac cath with results below. He denies chest pain, syncope, near syncope, palpitations. He has had a slow drop in Hgb since 3/18, denies nausea, vomiting, melena, hematochezia. He does experience leg cramps mostly at night, not with activity. Pt was discharged home from hospital and had outpatient EGD/colonscopy that showed gastritis and was recommended PPI. Pt was seen in office today in follow up from hospital discharge. He denies CP, SOB, dizziness or edema. He does feel fatigue with exertion/stairs. He saw Dr. Ambar Vela 7/2 for anemia workup and has labwork pending.      Cardiac Testing  Cardiac catheterization:   Dampened even with 5F cath on engagement at 20-30 points on both left main and RCA ostial  Suspect ostial RCA, LM and Circ disease     ECHO 6/22/18: Left ventricle: Systolic function was normal. Ejection fraction was  estimated in the range of 55 % to 60 %. There were no regional wall motion  abnormalities. Mitral valve: There was moderate calcification. Aortic valve: Leaflets exhibited sclerosis without stenosis. Tricuspid valve: There was mild regurgitation.  There was no pulmonary  hypertension.       Past Medical History:   Diagnosis Date    CAD (coronary artery disease) 11/10/2016    NSTEMI & 2 stents    Deafness 10/28/2012    DM [facility]diabetes mellitus) ([facility] [address]     Elevated cholesterol     Hypertension     NSTEMI (non-ST elevated myocardial infarction) ([facility] [address] 11/10/2016 Past Surgical History:   Procedure Laterality Date    CARDIAC SURG PROCEDURE UNLIST  11/11/2016    2 stents    COLONOSCOPY N/A 6/28/2018    COLONOSCOPY performed by Vadim Arevalo MD at [facility] HX APPENDECTOMY        Social History   Substance Use Topics    Smoking status: Never Smoker    Smokeless tobacco: Never Used    Alcohol use 1.2 oz/week     1 Cans of beer, 1 Shots of liquor per week      Family History   Problem Relation Age of Onset    Heart Disease Father     Hypertension Mother     Elevated Lipids Brother     Elevated Lipids Brother      Prior to Admission medications    Medication Sig Start Date End Date Taking? Authorizing Provider   mupirocin (BACTROBAN) 2 % ointment by Both Nostrils route two (2) times a day for 2 days. 7/7/18 7/9/18  Arthur May NP   chlorhexidine (PERIDEX) 0.12 % solution 15 mL by Swish and Spit route two (2) times a day for 2 days. 7/7/18 7/9/18  Arthur May NP   atorvastatin (LIPITOR) 20 mg tablet Take 1 Tab by mouth nightly. 6/25/18   Eloy Ha MD   metoprolol tartrate (LOPRESSOR) 50 mg tablet Take 1 Tab by mouth every twelve (12) hours. 6/25/18   Eloy Ha MD   amiodarone (PACERONE) 400 mg tablet Take 1 Tab by mouth every twelve (12) hours. 6/24/18   Heath Sr PA-C   losartan (COZAAR) 50 mg tablet Take 1 Tab by mouth daily. 6/22/18   Eloy Ha MD   metFORMIN (GLUCOPHAGE) 1,000 mg tablet TAKE ONE TABLET BY MOUTH TWICE DAILY WITH MEALS 5/21/18   Tay Redding MD   glipiZIDE (GLUCOTROL) 10 mg tablet TAKE ONE TABLET BY MOUTH TWICE DAILY 5/11/18   Tay Redding MD   hydroCHLOROthiazide (HYDRODIURIL) 25 mg tablet Take 25 mg by mouth daily (after breakfast).  2/17/18   Historical Provider   JANUVIA 50 mg tablet TAKE ONE TABLET BY MOUTH ONCE DAILY 1/27/18   Tay Redding MD   lancets (ONE TOUCH DELICA) 33 gauge misc Check sugar once daily 10/4/17   Tay Redidng MD   nitroglycerin (NITROSTAT) 0.4 mg SL tablet 5/8/17   Historical Provider   glucose blood VI test strips (ONETOUCH VERIO) strip Check sugar once daily 12/6/16   Tay Redding MD   therapeutic multivitamin [facility]) tablet Take 1 Tab by mouth daily. Historical Provider   aspirin 81 mg tablet Take 81 mg by mouth daily. Historical Provider       No Known Allergies    Review of Systems:   Consititutional: Denies fever or chills. Eyes:  Denies use of glasses or vision problems(cataracts). ENT:  Denies hearing or swallowing difficulty. CV: Denies CP, claudication, HTN. Resp: Denies dyspnea, productive cough. : Denies dialysis or kidney problems. GI: Denies ulcers, esophageal strictures, liver problems. M/S: Denies joint or bone problems, or implanted artificial hardware. Skin: Denies varicose veins, edema. Neuro: Denies strokes, or TIAs. Psych: Denies anxiety or depression. Endocrine: Denies thyroid problems or diabetes. Heme/Lymphatic: Denies easy bruising or lymphedema. Objective:     VS:   PRASHANT     Physical Exam:    General appearance: alert, cooperative, no distress  Head: normocephalic, without obvious abnormality; atraumatic  Eyes: conjunctivae/corneas clear; EOM's intact. Nose: nares normal; no drainage. Neck: no carotid bruit and no JVD  Lungs: clear to auscultation bilaterally  Heart: regular rate and rhythm; no murmur  Abdomen: soft, non-tender; bowel sounds normal  Extremities: moves all extremities; no weakness. Skin: Skin color normal; No varicose veins or edema. Neurologic: Grossly normal      Labs: No results for input(s): WBC, HGB, HCT, PLT, NA, K, BUN, CREA, GLU, GLUCPOC, INR, HGBEXT, HCTEXT, PLTEXT, HGBEXT, HCTEXT, PLTEXT in the last 72 hours. No lab exists for component: GLPOC, INREXT, INREXT    Diagnostics:   CXR: 6/22  No acute process     Carotid doppler:  Consistent with less than 50% stenosis (low end) of the  right internal carotid and less than 50% stenosis of the left internal   carotid.   Vertebrals are patent with antegrade flow. Right vertebral   flow is resistive (absent diastolic flow) and a more distal  obstruction/occlusion cannot be completely excluded. ABIs: MARIANELA is 1.12 on the right and 1.18 on the left. PVR  waveforms are near normal at the right and left ankles.     IMPRESSION:  There is no evidence of hemodynamically significant lower   extremity arterial obstruction. Bilat LE venous mapping:   Vein segments that are patent with a diameter of 3  millimeters or greater are the right SSV and left GSV and SSV . No  evidence of right or left lower extremity vein thrombosis. PFTS-FEV1:  2.16 -- 85% predicted     EKG: NSR  Assessment:     Active Problems:    CAD, multiple vessel (6/22/2018)        Plan:   The risk and benefit of surgery were reviewed with patient and family and all questions answered and the patient wishes to proceed. Risk include infection, bleeding, stroke, heart attack, irregular heart rhythm, kidney failure and death. The patient was given instructions and prescriptions for bactroban, peridex and amiodarone. The patient was instructed to take last dose of Metformin, Losartan, HCTZ. Surgery is scheduled for 7/9/18. STS Risk Calculator V2.81 - Discussed by surgeon with patient. Treatment Plan:    1. CAD w/ hx of stents x 2 (11/2016):  Probable NSTEMi w/ + EST.  No troponin noted. Preop workup and education completed. CABG planned 7/9 w/ Dr. Carin Fiore.  On asa, statin, BB.  Cont preop amio.     2. DM:  Hgba1c 7.4.  On metformin/glipizide/januvia.    Repeat CBC pending today. 3. Anemia:  Recent scope only showed gastritis. PPI recommended by GI.    4. HTN:  On BB, losartan. 5. Hyperlipidemia: cont statin. 6. Renal insufficiency:  Creat up to 1. 44.  Monitor. Avoid nephrotoxic meds. Repeat labs pending today.         Signed By: Lashae Still NP     July 3, 2018

## 2018-07-03 NOTE — PROGRESS NOTES
Patient: Jovany Sofia   Age: 78 y.o. Patient Care Team:  Silvestre Long MD as PCP - General (Family Practice)  Thuan Bell MD (Cardiology)  Bridgett Samson MD (Gastroenterology)  Jose Zhou, RN as Ambulatory Care Navigator (Family Practice)  Lanie Mccarty MD (Cardiothoracic Surgery)  Zena Beard, RN as Ambulatory Care Navigator (Cardiology)    Diagnosis: The encounter diagnosis was Coronary artery disease involving native coronary artery of native heart without angina pectoris. Problem List:   Patient Active Problem List   Diagnosis Code    Dyslipidemia, goal LDL below 100 E78.5    Cramp of limb R25.2    Deafness H91.90    Essential hypertension with goal blood pressure less than 130/85 I10    Type 2 diabetes mellitus with hyperglycemia (HCC) E11.65    Nonrheumatic aortic valve stenosis I35.0    Bruit of right carotid artery R09.89    Preop general physical exam Z01.818    Colon cancer screening Z12.11    Dyspnea on exertion R06.09    Coronary artery disease involving native coronary artery of native heart without angina pectoris I25.10    Hypercholesteremia E78.00    Hypertension, essential I10    Statin intolerance Z78.9    NSTEMI (non-ST elevated myocardial infarction) ([facility]) I21.4    CAD, multiple vessel I25.10        HPI: Pt is here for hospital follow up. Pt had outpt colonscopy and that only showed some gastritis. Pt also saw hematology on 7/2 for workup of anemia. Pt has infected tooth, with plans to extract tooth this Friday. He is on Amoxicillin and the pain has mostly resolved. Pt denies any issues, CP, SOB, dizziness or edema. He does feel fatigued with exertion like stairs. He does not check his blood sugars at home. Current Medications:   Current Outpatient Prescriptions   Medication Sig Dispense Refill    [START ON 7/7/2018] mupirocin (BACTROBAN) 2 % ointment by Both Nostrils route two (2) times a day for 2 days.  22 g 0    [START ON 7/7/2018] chlorhexidine (PERIDEX) 0.12 % solution 15 mL by Swish and Spit route two (2) times a day for 2 days. 473 mL 0    atorvastatin (LIPITOR) 20 mg tablet Take 1 Tab by mouth nightly. 30 Tab 3    metoprolol tartrate (LOPRESSOR) 50 mg tablet Take 1 Tab by mouth every twelve (12) hours. 60 Tab 2    amiodarone (PACERONE) 400 mg tablet Take 1 Tab by mouth every twelve (12) hours. 30 Tab 1    losartan (COZAAR) 50 mg tablet Take 1 Tab by mouth daily. 30 Tab 0    metFORMIN (GLUCOPHAGE) 1,000 mg tablet TAKE ONE TABLET BY MOUTH TWICE DAILY WITH MEALS 180 Tab 2    glipiZIDE (GLUCOTROL) 10 mg tablet TAKE ONE TABLET BY MOUTH TWICE DAILY 180 Tab 2    hydroCHLOROthiazide (HYDRODIURIL) 25 mg tablet Take 25 mg by mouth daily (after breakfast).  JANUVIA 50 mg tablet TAKE ONE TABLET BY MOUTH ONCE DAILY 90 Tab 1    lancets (ONE TOUCH DELICA) 33 gauge misc Check sugar once daily 100 Lancet 1    glucose blood VI test strips (ONETOUCH VERIO) strip Check sugar once daily 100 Strip 1    nitroglycerin (NITROSTAT) 0.4 mg SL tablet       therapeutic multivitamin (THERAGRAN) tablet Take 1 Tab by mouth daily.  aspirin 81 mg tablet Take 81 mg by mouth daily. Vitals: There were no vitals taken for this visit. Allergies: has No Known Allergies. Physical Exam    Lungs: clear to auscultation bilaterally    Heart: regular rate and rhythm, S1, S2 normal, no murmur, click, rub or gallop    Extremities: no edema     Assessment/Plan:   1. CAD w/ hx of stents x 2 (11/2016):  Probable NSTEMi w/ + EST. No troponin noted. Preop workup and education completed. CABG planned 7/9 w/ Dr. Carin Fiore. On asa, statin, BB. Cont preop amio. 2. DM:  Hgba1c 7.4. On metformin/glipizide/januvia. Repeat CBC pending today. 3. Anemia:  Recent scope only showed gastritis. PPI recommended by GI.    4. HTN:  On BB, losartan. 5. Hyperlipidemia: cont statin. 6. Renal insufficiency:  Creat up to 1.39. Monitor.  Avoid nephrotoxic meds.   Repeat labs pending today.

## 2018-07-03 NOTE — TELEPHONE ENCOUNTER
Called and left a message stating call us with questions  Silvia Simon, please call and see what they want to do

## 2018-07-04 DIAGNOSIS — I25.10 CAD, MULTIPLE VESSEL: Primary | ICD-10-CM

## 2018-07-04 LAB
ALBUMIN SERPL-MCNC: 4.2 G/DL (ref 3.6–4.8)
ALBUMIN/GLOB SERPL: 1.4 {RATIO} (ref 1.2–2.2)
ALP SERPL-CCNC: 100 IU/L (ref 39–117)
ALT SERPL-CCNC: 20 IU/L (ref 0–44)
AST SERPL-CCNC: 19 IU/L (ref 0–40)
BASOPHILS # BLD AUTO: 0.1 X10E3/UL (ref 0–0.2)
BASOPHILS NFR BLD AUTO: 1 %
BILIRUB SERPL-MCNC: <0.2 MG/DL (ref 0–1.2)
BUN SERPL-MCNC: 22 MG/DL (ref 8–27)
BUN/CREAT SERPL: 16 (ref 10–24)
CALCIUM SERPL-MCNC: 9.6 MG/DL (ref 8.6–10.2)
CHLORIDE SERPL-SCNC: 103 MMOL/L (ref 96–106)
CO2 SERPL-SCNC: 22 MMOL/L (ref 20–29)
CREAT SERPL-MCNC: 1.38 MG/DL (ref 0.76–1.27)
EOSINOPHIL # BLD AUTO: 0.3 X10E3/UL (ref 0–0.4)
EOSINOPHIL NFR BLD AUTO: 3 %
ERYTHROCYTE [DISTWIDTH] IN BLOOD BY AUTOMATED COUNT: 17.6 % (ref 12.3–15.4)
ERYTHROCYTE [DISTWIDTH] IN BLOOD BY AUTOMATED COUNT: 17.7 % (ref 12.3–15.4)
FERRITIN SERPL-MCNC: 12 NG/ML (ref 30–400)
FOLATE SERPL-MCNC: 10.5 NG/ML
GLOBULIN SER CALC-MCNC: 2.9 G/DL (ref 1.5–4.5)
GLUCOSE SERPL-MCNC: 195 MG/DL (ref 65–99)
HCT VFR BLD AUTO: 33.1 % (ref 37.5–51)
HCT VFR BLD AUTO: 33.6 % (ref 37.5–51)
HGB BLD-MCNC: 10.2 G/DL (ref 13–17.7)
HGB BLD-MCNC: 9.8 G/DL (ref 13–17.7)
IMM GRANULOCYTES # BLD: 0.1 X10E3/UL (ref 0–0.1)
IMM GRANULOCYTES NFR BLD: 1 %
IRON SATN MFR SERPL: 7 % (ref 15–55)
IRON SERPL-MCNC: 25 UG/DL (ref 38–169)
LYMPHOCYTES # BLD AUTO: 1.7 X10E3/UL (ref 0.7–3.1)
LYMPHOCYTES NFR BLD AUTO: 17 %
MAGNESIUM SERPL-MCNC: 1.7 MG/DL (ref 1.6–2.3)
MCH RBC QN AUTO: 22.8 PG (ref 26.6–33)
MCH RBC QN AUTO: 23.4 PG (ref 26.6–33)
MCHC RBC AUTO-ENTMCNC: 29.2 G/DL (ref 31.5–35.7)
MCHC RBC AUTO-ENTMCNC: 30.8 G/DL (ref 31.5–35.7)
MCV RBC AUTO: 76 FL (ref 79–97)
MCV RBC AUTO: 78 FL (ref 79–97)
MONOCYTES # BLD AUTO: 0.7 X10E3/UL (ref 0.1–0.9)
MONOCYTES NFR BLD AUTO: 7 %
NEUTROPHILS # BLD AUTO: 6.9 X10E3/UL (ref 1.4–7)
NEUTROPHILS NFR BLD AUTO: 71 %
PLATELET # BLD AUTO: 284 X10E3/UL (ref 150–379)
PLATELET # BLD AUTO: 300 X10E3/UL (ref 150–379)
POTASSIUM SERPL-SCNC: 6.8 MMOL/L (ref 3.5–5.2)
PROT SERPL-MCNC: 7.1 G/DL (ref 6–8.5)
RBC # BLD AUTO: 4.29 X10E6/UL (ref 4.14–5.8)
RBC # BLD AUTO: 4.35 X10E6/UL (ref 4.14–5.8)
RETICS/RBC NFR AUTO: 1.5 % (ref 0.6–2.6)
SODIUM SERPL-SCNC: 138 MMOL/L (ref 134–144)
TIBC SERPL-MCNC: 369 UG/DL (ref 250–450)
UIBC SERPL-MCNC: 344 UG/DL (ref 111–343)
VIT B12 SERPL-MCNC: <150 PG/ML (ref 232–1245)
WBC # BLD AUTO: 9.3 X10E3/UL (ref 3.4–10.8)
WBC # BLD AUTO: 9.6 X10E3/UL (ref 3.4–10.8)

## 2018-07-04 NOTE — PROGRESS NOTES
Pt is both iron deficient and B12 deficient  Need to replace both and pt needs to f/u with GI for loss source  If pt wants to come to us for care (see call from ?  Wife)

## 2018-07-05 RX ORDER — PANTOPRAZOLE SODIUM 40 MG/1
40 TABLET, DELAYED RELEASE ORAL DAILY
COMMUNITY
End: 2018-10-23 | Stop reason: ALTCHOICE

## 2018-07-05 NOTE — PERIOP NOTES
PAT PHONE INTERVIEW COMPLETED WITH PT.  PT WAS GIVEN INFECTION PREVENTION INFORMATION VERBALLY; PT VOICED UNDERSTANDING. PT WAS GIVEN THE OPPORTUNITY TO ASK ADDITIONAL QUESTIONS.     NO PTA MEDICATION DIRECTIONS GIVEN BY PAT; THOSE ARE GIVEN BY SURGEON'S OFFICE

## 2018-07-06 ENCOUNTER — TELEPHONE (OUTPATIENT)
Dept: CARDIOTHORACIC SURGERY | Age: 68
End: 2018-07-06

## 2018-07-06 ENCOUNTER — HOSPITAL ENCOUNTER (EMERGENCY)
Age: 68
Discharge: HOME | DRG: 236 | End: 2018-07-06
Attending: EMERGENCY MEDICINE
Payer: MEDICARE

## 2018-07-06 VITALS
DIASTOLIC BLOOD PRESSURE: 58 MMHG | BODY MASS INDEX: 24.66 KG/M2 | WEIGHT: 139.2 LBS | SYSTOLIC BLOOD PRESSURE: 171 MMHG | HEIGHT: 63 IN | OXYGEN SATURATION: 99 % | HEART RATE: 66 BPM | TEMPERATURE: 98.3 F | RESPIRATION RATE: 16 BRPM

## 2018-07-06 DIAGNOSIS — E87.6 HYPOKALEMIA: Primary | ICD-10-CM

## 2018-07-06 LAB
ALBUMIN SERPL-MCNC: 3.8 G/DL (ref 3.5–5)
ALBUMIN/GLOB SERPL: 0.9 {RATIO} (ref 1.1–2.2)
ALP SERPL-CCNC: 113 U/L (ref 45–117)
ALT SERPL-CCNC: 34 U/L (ref 12–78)
ANION GAP SERPL CALC-SCNC: 10 MMOL/L (ref 5–15)
AST SERPL-CCNC: 24 U/L (ref 15–37)
BASOPHILS # BLD: 0.1 K/UL (ref 0–0.1)
BASOPHILS NFR BLD: 1 % (ref 0–1)
BILIRUB SERPL-MCNC: 0.3 MG/DL (ref 0.2–1)
BUN SERPL-MCNC: 19 MG/DL (ref 6–20)
BUN SERPL-MCNC: 19 MG/DL (ref 8–27)
BUN/CREAT SERPL: 13 (ref 10–24)
BUN/CREAT SERPL: 13 (ref 12–20)
CALCIUM SERPL-MCNC: 8.6 MG/DL (ref 8.5–10.1)
CALCIUM SERPL-MCNC: 9.8 MG/DL (ref 8.6–10.2)
CHLORIDE SERPL-SCNC: 103 MMOL/L (ref 96–106)
CHLORIDE SERPL-SCNC: 105 MMOL/L (ref 97–108)
CO2 SERPL-SCNC: 21 MMOL/L (ref 20–29)
CO2 SERPL-SCNC: 22 MMOL/L (ref 21–32)
CREAT SERPL-MCNC: 1.46 MG/DL (ref 0.76–1.27)
CREAT SERPL-MCNC: 1.46 MG/DL (ref 0.7–1.3)
DIFFERENTIAL METHOD BLD: ABNORMAL
EOSINOPHIL # BLD: 0.3 K/UL (ref 0–0.4)
EOSINOPHIL NFR BLD: 2 % (ref 0–7)
ERYTHROCYTE [DISTWIDTH] IN BLOOD BY AUTOMATED COUNT: 16.8 % (ref 11.5–14.5)
GLOBULIN SER CALC-MCNC: 4.4 G/DL (ref 2–4)
GLUCOSE SERPL-MCNC: 135 MG/DL (ref 65–99)
GLUCOSE SERPL-MCNC: 250 MG/DL (ref 65–100)
HCT VFR BLD AUTO: 35 % (ref 36.6–50.3)
HGB BLD-MCNC: 10.3 G/DL (ref 12.1–17)
IMM GRANULOCYTES # BLD: 0.1 K/UL (ref 0–0.04)
IMM GRANULOCYTES NFR BLD AUTO: 1 % (ref 0–0.5)
LYMPHOCYTES # BLD: 1.9 K/UL (ref 0.8–3.5)
LYMPHOCYTES NFR BLD: 14 % (ref 12–49)
MCH RBC QN AUTO: 23.6 PG (ref 26–34)
MCHC RBC AUTO-ENTMCNC: 29.4 G/DL (ref 30–36.5)
MCV RBC AUTO: 80.3 FL (ref 80–99)
MONOCYTES # BLD: 0.7 K/UL (ref 0–1)
MONOCYTES NFR BLD: 5 % (ref 5–13)
NEUTS SEG # BLD: 9.9 K/UL (ref 1.8–8)
NEUTS SEG NFR BLD: 77 % (ref 32–75)
NRBC # BLD: 0 K/UL (ref 0–0.01)
NRBC BLD-RTO: 0 PER 100 WBC
PLATELET # BLD AUTO: 340 K/UL (ref 150–400)
PMV BLD AUTO: 11.3 FL (ref 8.9–12.9)
POTASSIUM SERPL-SCNC: 5.4 MMOL/L (ref 3.5–5.1)
POTASSIUM SERPL-SCNC: 6.5 MMOL/L (ref 3.5–5.2)
PROT SERPL-MCNC: 8.2 G/DL (ref 6.4–8.2)
RBC # BLD AUTO: 4.36 M/UL (ref 4.1–5.7)
SODIUM SERPL-SCNC: 137 MMOL/L (ref 134–144)
SODIUM SERPL-SCNC: 137 MMOL/L (ref 136–145)
TROPONIN I SERPL-MCNC: <0.05 NG/ML
WBC # BLD AUTO: 12.9 K/UL (ref 4.1–11.1)

## 2018-07-06 PROCEDURE — 36415 COLL VENOUS BLD VENIPUNCTURE: CPT | Performed by: EMERGENCY MEDICINE

## 2018-07-06 PROCEDURE — 84484 ASSAY OF TROPONIN QUANT: CPT | Performed by: EMERGENCY MEDICINE

## 2018-07-06 PROCEDURE — 80053 COMPREHEN METABOLIC PANEL: CPT | Performed by: EMERGENCY MEDICINE

## 2018-07-06 PROCEDURE — 85025 COMPLETE CBC W/AUTO DIFF WBC: CPT | Performed by: EMERGENCY MEDICINE

## 2018-07-06 PROCEDURE — 93005 ELECTROCARDIOGRAM TRACING: CPT

## 2018-07-06 PROCEDURE — 99282 EMERGENCY DEPT VISIT SF MDM: CPT

## 2018-07-06 NOTE — PROGRESS NOTES
Discussed lab results with Dr. Ashley Reynolds. [id number] [address] Payan for discharge home from [address] [place] [address]. Encouraged pt to remain hydrated over the weekend. Pt and family understands instructions for surgery on Monday.

## 2018-07-06 NOTE — PROGRESS NOTES
Spoke with wife Krista Garza. Advised of provider note. Stated patient had colonoscopy last week and is in ED currently and may require by-pass surgery. Advised to contact our office in 1-2 weeks post hospital stay for coordination of care. Support given. To provider.

## 2018-07-06 NOTE — ED NOTES
2:36 PM  I have evaluated the patient as the Provider in Triage. I have reviewed His vital signs and the triage nurse assessment. I have talked with the patient and any available family and advised that I am the provider in triage and have ordered the appropriate study to initiate their work up based on the clinical presentation during my assessment. I have advised that the patient will be accommodated in the Main ED as soon as possible. I have also requested to contact the triage nurse or myself immediately if the patient experiences any changes in their condition during this brief waiting period. Referred by Dr. Neetu Watson for hyperkalemia, elevated creatinine.     SCAR Dasilva

## 2018-07-06 NOTE — PROGRESS NOTES
Date of previous inpatient admission/ ED visit? Pt was hospitalized from 6/22-6/25 and has cardiac surgery scheduled for this Monday. What brought the patient back to ED? Pt had lab draws yesterday for cardiac surgery scheduled on Monday 7/09/18 with Dr. Michael Eng. Pt sent to ED due to lab results, potassium 6.5. Did patient decline recommended services during last admission/ ED visit (if yes, what)? No    Has patient seen a provider since their last inpatient admission/ED visit (if yes, when)? Yes, pt was seen by cardiac surgery, 7/03/2018 as outpatient. CM Interventions:  From previous inpatient admission/ED visit:  Assessment and pt given agencies for [facility] as well as inpt rehab pending his recovery from planned cardiac surger  From current inpatient admission/ED visit:  Pt likely to be admitted based on labs, referral from cardiac surgery for hospitalist admission.     CODI Brock

## 2018-07-06 NOTE — DISCHARGE INSTRUCTIONS
Hypokalemia: Care Instructions  Your Care Instructions    Hypokalemia (say \"jz-gj-qmj-LEDA-sarah-uh\") is a low level of potassium. The heart, muscles, kidneys, and nervous system all need potassium to work well. This problem has many different causes. Kidney problems, diet, and medicines like diuretics and laxatives can cause it. So can vomiting or diarrhea. In some cases, cancer is the cause. Your doctor may do tests to find the cause of your low potassium levels. You may need medicines to bring your potassium levels back to normal. You may also need regular blood tests to check your potassium. If you have very low potassium, you may need intravenous (IV) medicines. You also may need tests to check the electrical activity of your heart. Heart problems caused by low potassium levels can be very serious. Follow-up care is a key part of your treatment and safety. Be sure to make and go to all appointments, and call your doctor if you are having problems. It's also a good idea to know your test results and keep a list of the medicines you take. How can you care for yourself at home? · If your doctor recommends it, eat foods that have a lot of potassium. These include fresh fruits, juices, and vegetables. They also include nuts, beans, and milk. · Be safe with medicines. If your doctor prescribes medicines or potassium supplements, take them exactly as directed. Call your doctor if you have any problems with your medicines. · Get your potassium levels tested as often as your doctor tells you. When should you call for help? Call 911 anytime you think you may need emergency care. For example, call if:  ? · You feel like your heart is missing beats. Heart problems caused by low potassium can cause death. ? · You passed out (lost consciousness). ? · You have a seizure. ?Call your doctor now or seek immediate medical care if:  ? · You feel weak or unusually tired. ? · You have severe arm or leg cramps. ? · You have tingling or numbness. ? · You feel sick to your stomach, or you vomit. ? · You have belly cramps. ? · You feel bloated or constipated. ? · You have to urinate a lot. ? · You feel very thirsty most of the time. ? · You are dizzy or lightheaded, or you feel like you may faint. ? · You feel depressed, or you lose touch with reality. ? Watch closely for changes in your health, and be sure to contact your doctor if:  ? · You do not get better as expected. Where can you learn more? Go to http://pradeep-harjit.info/. Enter G358 in the search box to learn more about \"Hypokalemia: Care Instructions. \"  Current as of: May 12, 2017  Content Version: 11.4  © 1676-1731 [employer], [employer]. Care instructions adapted under license by [employer] (which disclaims liability or warranty for this information). If you have questions about a medical condition or this instruction, always ask your healthcare professional. [address] any warranty or liability for your use of this information.

## 2018-07-06 NOTE — ED PROVIDER NOTES
HPI Comments: 78 y.o. male with past medical history significant for DM, hypercholesterolemia, HTN, CAD and NSTEMI who presents with chief complaint of abnormal lab results. Per wife, pt has cardiac bypass scheduled for 3 days from now and pt's cardiologist wants pt to be admitted for control of elevated potassium level of ~6.5 in labs drawn yesterday. Pt reports he is currently asymptomatic and his K level in the ED is 5.4. Wife notes pt has felt cold \"all day for several days\". Pt denies he is on a diuretic. Pt denies current pain, urinary sx and bowel sx. There are no other acute medical concerns at this time. Social hx: , EtOH use (1 drink/ day)  PCP: Zoran Dyer MD    Note written by Archie Vergara, as dictated by Carlo Mann MD 3:51 PM       The history is provided by the patient and the spouse. Past Medical History:   Diagnosis Date    CAD (coronary artery disease) 11/10/2016    NSTEMI & 2 stents    Deafness 10/28/2012    DM (diabetes mellitus) ([facility] [address]     Elevated cholesterol     Hypertension     NSTEMI (non-ST elevated myocardial infarction) ([facility] [address] 11/10/2016       Past Surgical History:   Procedure Laterality Date    CARDIAC SURG PROCEDURE UNLIST  11/11/2016    2 stents    COLONOSCOPY N/A 6/28/2018    COL[facility] Bernard [facility] ENDOSCOPY    HX APPENDECTOMY           Family History:   Problem Relation Age of Onset    Heart Disease Father     Heart Attack Father     Hypertension Mother    [facility] Elevated Lipids Brother     Elevated Lipids Brother     No Known Problems Sister     Elevated Lipids Brother     No Known Problems Son     No Known Problems Daughter     Anesth Problems Neg Hx        Social History     Social History    Marital status:      Spouse name: N/A    Number of children: N/A    Years of education: N/A     Occupational History    Not on file.      Social History Main Topics    Smoking status: Never Smoker    Smokeless tobacco: Never Used    Alcohol use 1.2 oz/week     1 Cans of beer, 1 Shots of liquor per week      Comment: 1 DRINK DAILY    Drug use: No    Sexual activity: Not on file     Other Topics Concern    Not on file     Social History Narrative         ALLERGIES: Review of patient's allergies indicates no known allergies. Review of Systems   Constitutional: Positive for chills. Negative for fever. HENT: Negative for rhinorrhea and sore throat. Respiratory: Negative for cough and shortness of breath. Cardiovascular: Negative for chest pain. Gastrointestinal: Negative for abdominal pain, diarrhea, nausea and vomiting. Genitourinary: Negative for dysuria and hematuria. Musculoskeletal: Negative for arthralgias and myalgias. Skin: Negative for pallor and rash. Neurological: Negative for dizziness, weakness and light-headedness. All other systems reviewed and are negative. Vitals:    07/06/18 1426   BP: 199/73   Pulse: 63   Resp: 16   Temp: 98 °F (36.7 °C)   SpO2: 98%   Weight: 63.1 kg (139 lb 3.2 oz)   Height: 5' 3\" (1.6 m)            Physical Exam     Vital signs reviewed. Nursing notes reviewed.     Const:  No acute distress, well developed, well nourished  Head:  Atraumatic, normocephalic  Eyes:  PERRL, conjunctiva normal, no scleral icterus  Neck:  Supple, trachea midline  Cardiovascular:  RRR, no murmurs, no gallops, no rubs  Resp:  No resp distress, no increased work of breathing, no wheezes, no rhonchi, no rales,  Abd:  Soft, non-tender, non-distended, no rebound, no guarding, no CVA tenderness  :  Deferred  MSK:  No pedal edema, normal ROM  Neuro:  Alert and oriented x3, no cranial nerve defect  Skin:  Warm, dry, intact  Psych: normal mood and affect, behavior is normal, judgement and thought content is normal    Note written by Archie Lima, as dictated by Shante Barrientos MD 3:59 PM     MDM  Number of Diagnoses or Management Options  Hypokalemia:      Amount and/or Complexity of Data Reviewed  Clinical lab tests: ordered and reviewed  Review and summarize past medical records: yes    Patient Progress  Patient progress: stable    ED Course     Pt. Presents to the ER with hyperkalemia and scheduled for surgery on Monday. Her potassium was much improved in the ER. Pt. Seen by cardiac surgery and given the ok for discharge. Pt. Is asymptomatic in the ER. Pt. To return to the ER with worsening sx. Procedures    CONSULT NOTE:  4:00 PM Alejandro Cobian MD spoke with Joaquin Payne NP, Consult for Cardiology. Discussed available diagnostic tests and clinical findings. She explained that since pt's potassium level has decreased she spoke with pt's cardiologist and he recommends that pt is discharged.

## 2018-07-08 LAB
ATRIAL RATE: 69 BPM
CALCULATED P AXIS, ECG09: 44 DEGREES
CALCULATED R AXIS, ECG10: 75 DEGREES
CALCULATED T AXIS, ECG11: 75 DEGREES
DIAGNOSIS, 93000: NORMAL
P-R INTERVAL, ECG05: 158 MS
Q-T INTERVAL, ECG07: 416 MS
QRS DURATION, ECG06: 82 MS
QTC CALCULATION (BEZET), ECG08: 445 MS
VENTRICULAR RATE, ECG03: 69 BPM

## 2018-07-08 RX ORDER — FENTANYL CITRATE 50 UG/ML
25 INJECTION, SOLUTION INTRAMUSCULAR; INTRAVENOUS
Status: CANCELLED | OUTPATIENT
Start: 2018-07-08

## 2018-07-08 RX ORDER — OXYCODONE HYDROCHLORIDE 5 MG/1
5 TABLET ORAL AS NEEDED
Status: CANCELLED | OUTPATIENT
Start: 2018-07-08

## 2018-07-08 RX ORDER — ALBUMIN HUMAN 50 G/1000ML
25 SOLUTION INTRAVENOUS ONCE
Status: DISCONTINUED | OUTPATIENT
Start: 2018-07-09 | End: 2018-07-09 | Stop reason: HOSPADM

## 2018-07-08 RX ORDER — DOPAMINE HYDROCHLORIDE 320 MG/100ML
5-20 INJECTION, SOLUTION INTRAVENOUS
Status: DISCONTINUED | OUTPATIENT
Start: 2018-07-09 | End: 2018-07-10

## 2018-07-08 RX ORDER — ONDANSETRON 2 MG/ML
4 INJECTION INTRAMUSCULAR; INTRAVENOUS AS NEEDED
Status: CANCELLED | OUTPATIENT
Start: 2018-07-08

## 2018-07-08 RX ORDER — PROTAMINE SULFATE 10 MG/ML
500 INJECTION, SOLUTION INTRAVENOUS ONCE
Status: DISCONTINUED | OUTPATIENT
Start: 2018-07-09 | End: 2018-07-09 | Stop reason: HOSPADM

## 2018-07-08 RX ORDER — SODIUM CHLORIDE, SODIUM LACTATE, POTASSIUM CHLORIDE, CALCIUM CHLORIDE 600; 310; 30; 20 MG/100ML; MG/100ML; MG/100ML; MG/100ML
100 INJECTION, SOLUTION INTRAVENOUS CONTINUOUS
Status: CANCELLED | OUTPATIENT
Start: 2018-07-08

## 2018-07-08 RX ORDER — NITROGLYCERIN 20 MG/100ML
16.5 INJECTION INTRAVENOUS CONTINUOUS
Status: DISCONTINUED | OUTPATIENT
Start: 2018-07-09 | End: 2018-07-09

## 2018-07-08 RX ORDER — MAGNESIUM SULFATE HEPTAHYDRATE 40 MG/ML
2 INJECTION, SOLUTION INTRAVENOUS ONCE
Status: DISCONTINUED | OUTPATIENT
Start: 2018-07-09 | End: 2018-07-09 | Stop reason: HOSPADM

## 2018-07-08 RX ORDER — HEPARIN SOD,PORCINE/0.9 % NACL 30K/1000ML
50-1000 INTRAVENOUS SOLUTION INTRAVENOUS AS NEEDED
Status: DISCONTINUED | OUTPATIENT
Start: 2018-07-09 | End: 2018-07-09 | Stop reason: HOSPADM

## 2018-07-08 RX ORDER — MORPHINE SULFATE 10 MG/ML
2 INJECTION, SOLUTION INTRAMUSCULAR; INTRAVENOUS
Status: CANCELLED | OUTPATIENT
Start: 2018-07-08

## 2018-07-08 RX ORDER — SODIUM CHLORIDE 0.9 % (FLUSH) 0.9 %
5-10 SYRINGE (ML) INJECTION AS NEEDED
Status: CANCELLED | OUTPATIENT
Start: 2018-07-08

## 2018-07-08 RX ORDER — MIDAZOLAM HYDROCHLORIDE 1 MG/ML
0.5 INJECTION, SOLUTION INTRAMUSCULAR; INTRAVENOUS
Status: CANCELLED | OUTPATIENT
Start: 2018-07-08

## 2018-07-08 RX ORDER — POTASSIUM CHLORIDE 29.8 MG/ML
20 INJECTION INTRAVENOUS ONCE
Status: DISCONTINUED | OUTPATIENT
Start: 2018-07-09 | End: 2018-07-09 | Stop reason: HOSPADM

## 2018-07-08 RX ORDER — DOBUTAMINE HYDROCHLORIDE 200 MG/100ML
0-10 INJECTION INTRAVENOUS
Status: DISCONTINUED | OUTPATIENT
Start: 2018-07-09 | End: 2018-07-09

## 2018-07-08 RX ORDER — DIPHENHYDRAMINE HYDROCHLORIDE 50 MG/ML
12.5 INJECTION, SOLUTION INTRAMUSCULAR; INTRAVENOUS AS NEEDED
Status: CANCELLED | OUTPATIENT
Start: 2018-07-08 | End: 2018-07-08

## 2018-07-09 ENCOUNTER — ANESTHESIA (OUTPATIENT)
Dept: CARDIOTHORACIC SURGERY | Age: 68
DRG: 236 | End: 2018-07-09
Payer: MEDICARE

## 2018-07-09 ENCOUNTER — HOSPITAL ENCOUNTER (INPATIENT)
Age: 68
LOS: 9 days | Discharge: HOME HEALTH | DRG: 236 | End: 2018-07-18
Attending: THORACIC SURGERY (CARDIOTHORACIC VASCULAR SURGERY) | Admitting: THORACIC SURGERY (CARDIOTHORACIC VASCULAR SURGERY)
Payer: MEDICARE

## 2018-07-09 ENCOUNTER — APPOINTMENT (OUTPATIENT)
Dept: GENERAL RADIOLOGY | Age: 68
DRG: 236 | End: 2018-07-09
Attending: NURSE PRACTITIONER
Payer: MEDICARE

## 2018-07-09 ENCOUNTER — ANESTHESIA EVENT (OUTPATIENT)
Dept: CARDIOTHORACIC SURGERY | Age: 68
DRG: 236 | End: 2018-07-09
Payer: MEDICARE

## 2018-07-09 ENCOUNTER — DOCUMENTATION ONLY (OUTPATIENT)
Dept: ONCOLOGY | Age: 68
End: 2018-07-09

## 2018-07-09 DIAGNOSIS — Z95.1 S/P CABG X 3: Primary | ICD-10-CM

## 2018-07-09 PROBLEM — I25.10 CAD (CORONARY ARTERY DISEASE): Status: ACTIVE | Noted: 2018-07-09

## 2018-07-09 LAB
ADMINISTERED INITIALS, ADMINIT: NORMAL
ALBUMIN SERPL-MCNC: 3.1 G/DL (ref 3.5–5)
ALBUMIN SERPL-MCNC: 3.7 G/DL (ref 3.5–5)
ALBUMIN/GLOB SERPL: 1.3 {RATIO} (ref 1.1–2.2)
ALBUMIN/GLOB SERPL: 1.6 {RATIO} (ref 1.1–2.2)
ALP SERPL-CCNC: 48 U/L (ref 45–117)
ALP SERPL-CCNC: 56 U/L (ref 45–117)
ALT SERPL-CCNC: 23 U/L (ref 12–78)
ALT SERPL-CCNC: 23 U/L (ref 12–78)
ANION GAP SERPL CALC-SCNC: 4 MMOL/L (ref 5–15)
ANION GAP SERPL CALC-SCNC: 7 MMOL/L (ref 5–15)
APTT PPP: 22.7 SEC (ref 22.1–32)
ARTERIAL PATENCY WRIST A: ABNORMAL
AST SERPL-CCNC: 28 U/L (ref 15–37)
AST SERPL-CCNC: 34 U/L (ref 15–37)
BASE DEFICIT BLD-SCNC: 2 MMOL/L
BASE DEFICIT BLD-SCNC: 4 MMOL/L
BASE DEFICIT BLDV-SCNC: 5 MMOL/L
BASOPHILS # BLD: 0.1 K/UL (ref 0–0.1)
BASOPHILS NFR BLD: 0 % (ref 0–1)
BDY SITE: ABNORMAL
BILIRUB SERPL-MCNC: 0.2 MG/DL (ref 0.2–1)
BILIRUB SERPL-MCNC: 0.3 MG/DL (ref 0.2–1)
BUN SERPL-MCNC: 13 MG/DL (ref 6–20)
BUN SERPL-MCNC: 15 MG/DL (ref 6–20)
BUN/CREAT SERPL: 10 (ref 12–20)
BUN/CREAT SERPL: 9 (ref 12–20)
CALCIUM SERPL-MCNC: 7.7 MG/DL (ref 8.5–10.1)
CALCIUM SERPL-MCNC: 7.7 MG/DL (ref 8.5–10.1)
CHLORIDE SERPL-SCNC: 112 MMOL/L (ref 97–108)
CHLORIDE SERPL-SCNC: 113 MMOL/L (ref 97–108)
CO2 SERPL-SCNC: 25 MMOL/L (ref 21–32)
CO2 SERPL-SCNC: 27 MMOL/L (ref 21–32)
CREAT SERPL-MCNC: 1.4 MG/DL (ref 0.7–1.3)
CREAT SERPL-MCNC: 1.43 MG/DL (ref 0.7–1.3)
D50 ADMINISTERED, D50ADM: 0 ML
D50 ORDER, D50ORD: 0 ML
DIFFERENTIAL METHOD BLD: ABNORMAL
EOSINOPHIL # BLD: 0.1 K/UL (ref 0–0.4)
EOSINOPHIL NFR BLD: 1 % (ref 0–7)
ERYTHROCYTE [DISTWIDTH] IN BLOOD BY AUTOMATED COUNT: 16.6 % (ref 11.5–14.5)
GAS FLOW.O2 O2 DELIVERY SYS: ABNORMAL L/MIN
GAS FLOW.O2 SETTING OXYMISER: 14 BPM
GAS FLOW.O2 SETTING OXYMISER: 14 BPM
GLOBULIN SER CALC-MCNC: 2.3 G/DL (ref 2–4)
GLOBULIN SER CALC-MCNC: 2.4 G/DL (ref 2–4)
GLSCOM COMMENTS: NORMAL
GLUCOSE BLD STRIP.AUTO-MCNC: 102 MG/DL (ref 65–100)
GLUCOSE BLD STRIP.AUTO-MCNC: 104 MG/DL (ref 65–100)
GLUCOSE BLD STRIP.AUTO-MCNC: 108 MG/DL (ref 65–100)
GLUCOSE BLD STRIP.AUTO-MCNC: 114 MG/DL (ref 65–100)
GLUCOSE BLD STRIP.AUTO-MCNC: 118 MG/DL (ref 65–100)
GLUCOSE BLD STRIP.AUTO-MCNC: 120 MG/DL (ref 65–100)
GLUCOSE BLD STRIP.AUTO-MCNC: 120 MG/DL (ref 65–100)
GLUCOSE BLD STRIP.AUTO-MCNC: 121 MG/DL (ref 65–100)
GLUCOSE BLD STRIP.AUTO-MCNC: 123 MG/DL (ref 65–100)
GLUCOSE BLD STRIP.AUTO-MCNC: 181 MG/DL (ref 65–100)
GLUCOSE SERPL-MCNC: 117 MG/DL (ref 65–100)
GLUCOSE SERPL-MCNC: 97 MG/DL (ref 65–100)
GLUCOSE, GLC: 102 MG/DL
GLUCOSE, GLC: 104 MG/DL
GLUCOSE, GLC: 108 MG/DL
GLUCOSE, GLC: 113 MG/DL
GLUCOSE, GLC: 114 MG/DL
GLUCOSE, GLC: 118 MG/DL
GLUCOSE, GLC: 120 MG/DL
GLUCOSE, GLC: 120 MG/DL
GLUCOSE, GLC: 121 MG/DL
GLUCOSE, GLC: 123 MG/DL
GLUCOSE, GLC: 150 MG/DL
GLUCOSE, GLC: 185 MG/DL
GLUCOSE, GLC: 97 MG/DL
HCO3 BLD-SCNC: 21.6 MMOL/L (ref 22–26)
HCO3 BLD-SCNC: 21.8 MMOL/L (ref 22–26)
HCO3 BLDV-SCNC: 19.9 MMOL/L (ref 23–28)
HCT VFR BLD AUTO: 20.5 % (ref 36.6–50.3)
HCT VFR BLD AUTO: 23 % (ref 36.6–50.3)
HCT VFR BLD AUTO: 24.4 % (ref 36.6–50.3)
HGB BLD-MCNC: 6.2 G/DL (ref 12.1–17)
HGB BLD-MCNC: 6.9 G/DL (ref 12.1–17)
HGB BLD-MCNC: 7.4 G/DL (ref 12.1–17)
HIGH TARGET, HITG: 135 MG/DL
HIGH TARGET, HITG: 140 MG/DL
IMM GRANULOCYTES # BLD: 0.2 K/UL (ref 0–0.04)
IMM GRANULOCYTES NFR BLD AUTO: 1 % (ref 0–0.5)
INR PPP: 1.3 (ref 0.9–1.1)
INSULIN ADMINSTERED, INSADM: 1.1 UNITS/HOUR
INSULIN ADMINSTERED, INSADM: 1.3 UNITS/HOUR
INSULIN ADMINSTERED, INSADM: 1.3 UNITS/HOUR
INSULIN ADMINSTERED, INSADM: 1.4 UNITS/HOUR
INSULIN ADMINSTERED, INSADM: 1.6 UNITS/HOUR
INSULIN ADMINSTERED, INSADM: 1.7 UNITS/HOUR
INSULIN ADMINSTERED, INSADM: 1.8 UNITS/HOUR
INSULIN ADMINSTERED, INSADM: 1.9 UNITS/HOUR
INSULIN ADMINSTERED, INSADM: 2.1 UNITS/HOUR
INSULIN ADMINSTERED, INSADM: 3.6 UNITS/HOUR
INSULIN ADMINSTERED, INSADM: 3.8 UNITS/HOUR
INSULIN ORDER, INSORD: 1.1 UNITS/HOUR
INSULIN ORDER, INSORD: 1.3 UNITS/HOUR
INSULIN ORDER, INSORD: 1.3 UNITS/HOUR
INSULIN ORDER, INSORD: 1.4 UNITS/HOUR
INSULIN ORDER, INSORD: 1.6 UNITS/HOUR
INSULIN ORDER, INSORD: 1.7 UNITS/HOUR
INSULIN ORDER, INSORD: 1.8 UNITS/HOUR
INSULIN ORDER, INSORD: 1.9 UNITS/HOUR
INSULIN ORDER, INSORD: 2.1 UNITS/HOUR
INSULIN ORDER, INSORD: 3.6 UNITS/HOUR
INSULIN ORDER, INSORD: 3.8 UNITS/HOUR
LOW TARGET, LOT: 100 MG/DL
LOW TARGET, LOT: 95 MG/DL
LYMPHOCYTES # BLD: 1.2 K/UL (ref 0.8–3.5)
LYMPHOCYTES NFR BLD: 6 % (ref 12–49)
MAGNESIUM SERPL-MCNC: 2 MG/DL (ref 1.6–2.4)
MAGNESIUM SERPL-MCNC: 2.1 MG/DL (ref 1.6–2.4)
MCH RBC QN AUTO: 23.8 PG (ref 26–34)
MCHC RBC AUTO-ENTMCNC: 30.3 G/DL (ref 30–36.5)
MCV RBC AUTO: 78.5 FL (ref 80–99)
MINUTES UNTIL NEXT BG, NBG: 120 MIN
MINUTES UNTIL NEXT BG, NBG: 60 MIN
MONOCYTES # BLD: 1.1 K/UL (ref 0–1)
MONOCYTES NFR BLD: 5 % (ref 5–13)
MULTIPLIER, MUL: 0.03
MULTIPLIER, MUL: 0.04
MULTIPLIER, MUL: 0.04
NEUTS SEG # BLD: 16.8 K/UL (ref 1.8–8)
NEUTS SEG NFR BLD: 87 % (ref 32–75)
NRBC # BLD: 0 K/UL (ref 0–0.01)
NRBC BLD-RTO: 0 PER 100 WBC
O2/TOTAL GAS SETTING VFR VENT: 40 %
O2/TOTAL GAS SETTING VFR VENT: 60 %
O2/TOTAL GAS SETTING VFR VENT: 60 %
ORDER INITIALS, ORDINIT: NORMAL
PCO2 BLD: 30.9 MMHG (ref 35–45)
PCO2 BLD: 38.1 MMHG (ref 35–45)
PCO2 BLDV: 31.6 MMHG (ref 41–51)
PEEP RESPIRATORY: 5 CMH2O
PH BLD: 7.36 [PH] (ref 7.35–7.45)
PH BLD: 7.45 [PH] (ref 7.35–7.45)
PH BLDV: 7.41 [PH] (ref 7.32–7.42)
PLATELET # BLD AUTO: 214 K/UL (ref 150–400)
PMV BLD AUTO: 10.8 FL (ref 8.9–12.9)
PO2 BLD: 200 MMHG (ref 80–100)
PO2 BLD: 98 MMHG (ref 80–100)
PO2 BLDV: 28 MMHG (ref 25–40)
POTASSIUM SERPL-SCNC: 4.5 MMOL/L (ref 3.5–5.1)
POTASSIUM SERPL-SCNC: 4.5 MMOL/L (ref 3.5–5.1)
PRESSURE SUPPORT SETTING VENT: 10 CMH2O
PRESSURE SUPPORT SETTING VENT: 10 CMH2O
PRESSURE SUPPORT SETTING VENT: 5 CMH2O
PROT SERPL-MCNC: 5.5 G/DL (ref 6.4–8.2)
PROT SERPL-MCNC: 6 G/DL (ref 6.4–8.2)
PROTHROMBIN TIME: 13.1 SEC (ref 9–11.1)
RBC # BLD AUTO: 3.11 M/UL (ref 4.1–5.7)
SAO2 % BLD: 100 % (ref 92–97)
SAO2 % BLD: 97 % (ref 92–97)
SAO2 % BLDV: 54 % (ref 65–88)
SERVICE CMNT-IMP: ABNORMAL
SODIUM SERPL-SCNC: 143 MMOL/L (ref 136–145)
SODIUM SERPL-SCNC: 145 MMOL/L (ref 136–145)
SPECIMEN TYPE: ABNORMAL
THERAPEUTIC RANGE,PTTT: NORMAL SECS (ref 58–77)
VENTILATION MODE VENT: ABNORMAL
VOLUME CONTROL IVLC: YES
VOLUME CONTROL IVLC: YES
VT SETTING VENT: 500 ML
VT SETTING VENT: 500 ML
WBC # BLD AUTO: 19.3 K/UL (ref 4.1–11.1)

## 2018-07-09 PROCEDURE — 77030010796: Performed by: THORACIC SURGERY (CARDIOTHORACIC VASCULAR SURGERY)

## 2018-07-09 PROCEDURE — 77030019579 HC CBL PACE DISP REMG -B: Performed by: THORACIC SURGERY (CARDIOTHORACIC VASCULAR SURGERY)

## 2018-07-09 PROCEDURE — 3E080GC INTRODUCTION OF OTHER THERAPEUTIC SUBSTANCE INTO HEART, OPEN APPROACH: ICD-10-PCS | Performed by: THORACIC SURGERY (CARDIOTHORACIC VASCULAR SURGERY)

## 2018-07-09 PROCEDURE — 77030002986 HC SUT PROL J&J -A: Performed by: THORACIC SURGERY (CARDIOTHORACIC VASCULAR SURGERY)

## 2018-07-09 PROCEDURE — 77030012390 HC DRN CHST BTL GTNG -B: Performed by: THORACIC SURGERY (CARDIOTHORACIC VASCULAR SURGERY)

## 2018-07-09 PROCEDURE — 74011000258 HC RX REV CODE- 258: Performed by: NURSE PRACTITIONER

## 2018-07-09 PROCEDURE — 02100Z9 BYPASS CORONARY ARTERY, ONE ARTERY FROM LEFT INTERNAL MAMMARY, OPEN APPROACH: ICD-10-PCS | Performed by: THORACIC SURGERY (CARDIOTHORACIC VASCULAR SURGERY)

## 2018-07-09 PROCEDURE — 83735 ASSAY OF MAGNESIUM: CPT | Performed by: NURSE PRACTITIONER

## 2018-07-09 PROCEDURE — 82962 GLUCOSE BLOOD TEST: CPT

## 2018-07-09 PROCEDURE — 86923 COMPATIBILITY TEST ELECTRIC: CPT | Performed by: NURSE PRACTITIONER

## 2018-07-09 PROCEDURE — 77030010605 HC BLWR MR MAL MEDT -B: Performed by: THORACIC SURGERY (CARDIOTHORACIC VASCULAR SURGERY)

## 2018-07-09 PROCEDURE — C1751 CATH, INF, PER/CENT/MIDLINE: HCPCS

## 2018-07-09 PROCEDURE — 77030026438 HC STYL ET INTUB CARD -A: Performed by: ANESTHESIOLOGY

## 2018-07-09 PROCEDURE — 77030014491 HC PLEDG PTFE BARD -A: Performed by: THORACIC SURGERY (CARDIOTHORACIC VASCULAR SURGERY)

## 2018-07-09 PROCEDURE — 74011250637 HC RX REV CODE- 250/637: Performed by: NURSE PRACTITIONER

## 2018-07-09 PROCEDURE — 77030002987 HC SUT PROL J&J -B: Performed by: THORACIC SURGERY (CARDIOTHORACIC VASCULAR SURGERY)

## 2018-07-09 PROCEDURE — 77030012407 HC DRN WND BARD -B: Performed by: THORACIC SURGERY (CARDIOTHORACIC VASCULAR SURGERY)

## 2018-07-09 PROCEDURE — 77030020256 HC SOL INJ NACL 0.9%  500ML: Performed by: THORACIC SURGERY (CARDIOTHORACIC VASCULAR SURGERY)

## 2018-07-09 PROCEDURE — 74011000250 HC RX REV CODE- 250: Performed by: THORACIC SURGERY (CARDIOTHORACIC VASCULAR SURGERY)

## 2018-07-09 PROCEDURE — P9045 ALBUMIN (HUMAN), 5%, 250 ML: HCPCS

## 2018-07-09 PROCEDURE — 77030020747 HC TU INSUF ENDOSC TELE -A: Performed by: THORACIC SURGERY (CARDIOTHORACIC VASCULAR SURGERY)

## 2018-07-09 PROCEDURE — 77030003029 HC SUT VCRL J&J -B: Performed by: THORACIC SURGERY (CARDIOTHORACIC VASCULAR SURGERY)

## 2018-07-09 PROCEDURE — 77030013861 HC PNCH AORT CLNCUT QUES -B: Performed by: THORACIC SURGERY (CARDIOTHORACIC VASCULAR SURGERY)

## 2018-07-09 PROCEDURE — 85730 THROMBOPLASTIN TIME PARTIAL: CPT | Performed by: NURSE PRACTITIONER

## 2018-07-09 PROCEDURE — P9045 ALBUMIN (HUMAN), 5%, 250 ML: HCPCS | Performed by: NURSE PRACTITIONER

## 2018-07-09 PROCEDURE — 06BP4ZZ EXCISION OF RIGHT SAPHENOUS VEIN, PERCUTANEOUS ENDOSCOPIC APPROACH: ICD-10-PCS | Performed by: THORACIC SURGERY (CARDIOTHORACIC VASCULAR SURGERY)

## 2018-07-09 PROCEDURE — 65610000003 HC RM ICU SURGICAL

## 2018-07-09 PROCEDURE — 77030006247 HC LD PCMKR MYOCRD BPLR TEMP MEDT -B: Performed by: THORACIC SURGERY (CARDIOTHORACIC VASCULAR SURGERY)

## 2018-07-09 PROCEDURE — 74011250636 HC RX REV CODE- 250/636: Performed by: ANESTHESIOLOGY

## 2018-07-09 PROCEDURE — 74011250636 HC RX REV CODE- 250/636: Performed by: NURSE PRACTITIONER

## 2018-07-09 PROCEDURE — 85014 HEMATOCRIT: CPT | Performed by: NURSE PRACTITIONER

## 2018-07-09 PROCEDURE — 80053 COMPREHEN METABOLIC PANEL: CPT | Performed by: NURSE PRACTITIONER

## 2018-07-09 PROCEDURE — 74011000258 HC RX REV CODE- 258: Performed by: THORACIC SURGERY (CARDIOTHORACIC VASCULAR SURGERY)

## 2018-07-09 PROCEDURE — 77030018846 HC SOL IRR STRL H20 ICUM -A: Performed by: THORACIC SURGERY (CARDIOTHORACIC VASCULAR SURGERY)

## 2018-07-09 PROCEDURE — 77030006689 HC BLD OPHTH BVR BD -A: Performed by: THORACIC SURGERY (CARDIOTHORACIC VASCULAR SURGERY)

## 2018-07-09 PROCEDURE — 36415 COLL VENOUS BLD VENIPUNCTURE: CPT | Performed by: NURSE PRACTITIONER

## 2018-07-09 PROCEDURE — 82803 BLOOD GASES ANY COMBINATION: CPT

## 2018-07-09 PROCEDURE — 94002 VENT MGMT INPAT INIT DAY: CPT

## 2018-07-09 PROCEDURE — 74011250636 HC RX REV CODE- 250/636: Performed by: THORACIC SURGERY (CARDIOTHORACIC VASCULAR SURGERY)

## 2018-07-09 PROCEDURE — 77030039266 HC ADH SKN EXOFIN S2SG -A: Performed by: THORACIC SURGERY (CARDIOTHORACIC VASCULAR SURGERY)

## 2018-07-09 PROCEDURE — 76060000039 HC ANESTHESIA 4 TO 4.5 HR: Performed by: THORACIC SURGERY (CARDIOTHORACIC VASCULAR SURGERY)

## 2018-07-09 PROCEDURE — 77030018835 HC SOL IRR LR ICUM -A: Performed by: THORACIC SURGERY (CARDIOTHORACIC VASCULAR SURGERY)

## 2018-07-09 PROCEDURE — 77030006920 HC BLD STRNL SAW STRY -B: Performed by: THORACIC SURGERY (CARDIOTHORACIC VASCULAR SURGERY)

## 2018-07-09 PROCEDURE — 77030006994: Performed by: THORACIC SURGERY (CARDIOTHORACIC VASCULAR SURGERY)

## 2018-07-09 PROCEDURE — 85610 PROTHROMBIN TIME: CPT | Performed by: NURSE PRACTITIONER

## 2018-07-09 PROCEDURE — 74011250636 HC RX REV CODE- 250/636

## 2018-07-09 PROCEDURE — 85025 COMPLETE CBC W/AUTO DIFF WBC: CPT | Performed by: NURSE PRACTITIONER

## 2018-07-09 PROCEDURE — 74011250637 HC RX REV CODE- 250/637: Performed by: THORACIC SURGERY (CARDIOTHORACIC VASCULAR SURGERY)

## 2018-07-09 PROCEDURE — 021109W BYPASS CORONARY ARTERY, TWO ARTERIES FROM AORTA WITH AUTOLOGOUS VENOUS TISSUE, OPEN APPROACH: ICD-10-PCS | Performed by: THORACIC SURGERY (CARDIOTHORACIC VASCULAR SURGERY)

## 2018-07-09 PROCEDURE — 74011000258 HC RX REV CODE- 258

## 2018-07-09 PROCEDURE — 77030018729 HC ELECTRD DEFIB PAD CARD -B

## 2018-07-09 PROCEDURE — 77030018836 HC SOL IRR NACL ICUM -A: Performed by: THORACIC SURGERY (CARDIOTHORACIC VASCULAR SURGERY)

## 2018-07-09 PROCEDURE — 77030020263 HC SOL INJ SOD CL0.9% LFCR 1000ML: Performed by: THORACIC SURGERY (CARDIOTHORACIC VASCULAR SURGERY)

## 2018-07-09 PROCEDURE — 74011000272 HC RX REV CODE- 272

## 2018-07-09 PROCEDURE — 74011000250 HC RX REV CODE- 250

## 2018-07-09 PROCEDURE — 77030018719 HC DRSG PTCH ANTIMIC J&J -A

## 2018-07-09 PROCEDURE — 77030010389 HC WRE ATR PACE AEMC -B: Performed by: THORACIC SURGERY (CARDIOTHORACIC VASCULAR SURGERY)

## 2018-07-09 PROCEDURE — 86900 BLOOD TYPING SEROLOGIC ABO: CPT | Performed by: NURSE PRACTITIONER

## 2018-07-09 PROCEDURE — B246ZZ4 ULTRASONOGRAPHY OF RIGHT AND LEFT HEART, TRANSESOPHAGEAL: ICD-10-PCS | Performed by: THORACIC SURGERY (CARDIOTHORACIC VASCULAR SURGERY)

## 2018-07-09 PROCEDURE — 74011636637 HC RX REV CODE- 636/637

## 2018-07-09 PROCEDURE — 77030019702 HC WRP THER MENM -C: Performed by: THORACIC SURGERY (CARDIOTHORACIC VASCULAR SURGERY)

## 2018-07-09 PROCEDURE — 74011000272 HC RX REV CODE- 272: Performed by: THORACIC SURGERY (CARDIOTHORACIC VASCULAR SURGERY)

## 2018-07-09 PROCEDURE — 77030022199 HC SYS HARV VESL GTNG -G1: Performed by: THORACIC SURGERY (CARDIOTHORACIC VASCULAR SURGERY)

## 2018-07-09 PROCEDURE — 77030013798 HC KT TRNSDUC PRSSR EDWD -B: Performed by: THORACIC SURGERY (CARDIOTHORACIC VASCULAR SURGERY)

## 2018-07-09 PROCEDURE — 74011000250 HC RX REV CODE- 250: Performed by: NURSE PRACTITIONER

## 2018-07-09 PROCEDURE — 5A1221Z PERFORMANCE OF CARDIAC OUTPUT, CONTINUOUS: ICD-10-PCS | Performed by: THORACIC SURGERY (CARDIOTHORACIC VASCULAR SURGERY)

## 2018-07-09 PROCEDURE — 77030005402 HC CATH RAD ART LN KT TELE -B

## 2018-07-09 PROCEDURE — 77030008684 HC TU ET CUF COVD -B: Performed by: ANESTHESIOLOGY

## 2018-07-09 PROCEDURE — 77030011255 HC DSG AQUACEL AG BMS -A: Performed by: THORACIC SURGERY (CARDIOTHORACIC VASCULAR SURGERY)

## 2018-07-09 PROCEDURE — 77030002524 HC INSTR CLMP FGRTY EDWD -B: Performed by: THORACIC SURGERY (CARDIOTHORACIC VASCULAR SURGERY)

## 2018-07-09 PROCEDURE — P9047 ALBUMIN (HUMAN), 25%, 50ML: HCPCS

## 2018-07-09 PROCEDURE — 71045 X-RAY EXAM CHEST 1 VIEW: CPT

## 2018-07-09 PROCEDURE — 77030002973 HC SUT PLEDG CV SFT OVL TELE -B: Performed by: THORACIC SURGERY (CARDIOTHORACIC VASCULAR SURGERY)

## 2018-07-09 PROCEDURE — 77030010818: Performed by: THORACIC SURGERY (CARDIOTHORACIC VASCULAR SURGERY)

## 2018-07-09 PROCEDURE — 77030002933 HC SUT MCRYL J&J -A: Performed by: THORACIC SURGERY (CARDIOTHORACIC VASCULAR SURGERY)

## 2018-07-09 PROCEDURE — 77030034420 HC BN PTTY HEMOSORB ABRY -B: Performed by: THORACIC SURGERY (CARDIOTHORACIC VASCULAR SURGERY)

## 2018-07-09 PROCEDURE — 76010000114 HC CV SURG 4 TO 4.5 HR: Performed by: THORACIC SURGERY (CARDIOTHORACIC VASCULAR SURGERY)

## 2018-07-09 PROCEDURE — 74011250637 HC RX REV CODE- 250/637

## 2018-07-09 PROCEDURE — 77030005401 HC CATH RAD ARRO -A

## 2018-07-09 PROCEDURE — 77030011640 HC PAD GRND REM COVD -A: Performed by: THORACIC SURGERY (CARDIOTHORACIC VASCULAR SURGERY)

## 2018-07-09 DEVICE — PUTTY BONE HEMSTAT ABSORB MTRX 2.0GRAM: Type: IMPLANTABLE DEVICE | Status: FUNCTIONAL

## 2018-07-09 RX ORDER — MORPHINE SULFATE 1 MG/ML
2 INJECTION, SOLUTION EPIDURAL; INTRATHECAL; INTRAVENOUS
Status: DISCONTINUED | OUTPATIENT
Start: 2018-07-09 | End: 2018-07-11

## 2018-07-09 RX ORDER — CEFAZOLIN SODIUM/WATER 2 G/20 ML
2 SYRINGE (ML) INTRAVENOUS EVERY 6 HOURS
Status: COMPLETED | OUTPATIENT
Start: 2018-07-09 | End: 2018-07-11

## 2018-07-09 RX ORDER — ONDANSETRON 2 MG/ML
4 INJECTION INTRAMUSCULAR; INTRAVENOUS
Status: DISCONTINUED | OUTPATIENT
Start: 2018-07-09 | End: 2018-07-18 | Stop reason: HOSPADM

## 2018-07-09 RX ORDER — PROTAMINE SULFATE 10 MG/ML
INJECTION, SOLUTION INTRAVENOUS AS NEEDED
Status: DISCONTINUED | OUTPATIENT
Start: 2018-07-09 | End: 2018-07-10 | Stop reason: HOSPADM

## 2018-07-09 RX ORDER — BACITRACIN 500 UNIT/G
1 PACKET (EA) TOPICAL AS NEEDED
Status: DISCONTINUED | OUTPATIENT
Start: 2018-07-09 | End: 2018-07-18 | Stop reason: HOSPADM

## 2018-07-09 RX ORDER — SODIUM CHLORIDE 0.9 % (FLUSH) 0.9 %
5-10 SYRINGE (ML) INJECTION AS NEEDED
Status: DISCONTINUED | OUTPATIENT
Start: 2018-07-09 | End: 2018-07-11

## 2018-07-09 RX ORDER — FENTANYL CITRATE 50 UG/ML
50 INJECTION, SOLUTION INTRAMUSCULAR; INTRAVENOUS AS NEEDED
Status: DISCONTINUED | OUTPATIENT
Start: 2018-07-09 | End: 2018-07-09 | Stop reason: HOSPADM

## 2018-07-09 RX ORDER — POTASSIUM CHLORIDE 29.8 MG/ML
20 INJECTION INTRAVENOUS
Status: ACTIVE | OUTPATIENT
Start: 2018-07-09 | End: 2018-07-10

## 2018-07-09 RX ORDER — MUPIROCIN 20 MG/G
OINTMENT TOPICAL 2 TIMES DAILY
Status: DISCONTINUED | OUTPATIENT
Start: 2018-07-09 | End: 2018-07-14

## 2018-07-09 RX ORDER — OXYCODONE AND ACETAMINOPHEN 5; 325 MG/1; MG/1
1 TABLET ORAL
Status: DISCONTINUED | OUTPATIENT
Start: 2018-07-09 | End: 2018-07-09

## 2018-07-09 RX ORDER — ACETAMINOPHEN 10 MG/ML
1000 INJECTION, SOLUTION INTRAVENOUS EVERY 6 HOURS
Status: COMPLETED | OUTPATIENT
Start: 2018-07-09 | End: 2018-07-10

## 2018-07-09 RX ORDER — SODIUM CHLORIDE 9 MG/ML
25 INJECTION, SOLUTION INTRAVENOUS CONTINUOUS
Status: DISCONTINUED | OUTPATIENT
Start: 2018-07-09 | End: 2018-07-09 | Stop reason: HOSPADM

## 2018-07-09 RX ORDER — SODIUM CHLORIDE 0.9 % (FLUSH) 0.9 %
10 SYRINGE (ML) INJECTION AS NEEDED
Status: DISCONTINUED | OUTPATIENT
Start: 2018-07-09 | End: 2018-07-11

## 2018-07-09 RX ORDER — OXYCODONE HYDROCHLORIDE 5 MG/1
10 TABLET ORAL
Status: DISCONTINUED | OUTPATIENT
Start: 2018-07-09 | End: 2018-07-18 | Stop reason: HOSPADM

## 2018-07-09 RX ORDER — OXYCODONE HYDROCHLORIDE 5 MG/1
5 TABLET ORAL
Status: DISCONTINUED | OUTPATIENT
Start: 2018-07-09 | End: 2018-07-18 | Stop reason: HOSPADM

## 2018-07-09 RX ORDER — LANOLIN ALCOHOL/MO/W.PET/CERES
400 CREAM (GRAM) TOPICAL 2 TIMES DAILY
Status: DISCONTINUED | OUTPATIENT
Start: 2018-07-10 | End: 2018-07-18 | Stop reason: HOSPADM

## 2018-07-09 RX ORDER — LIDOCAINE HYDROCHLORIDE 10 MG/ML
0.1 INJECTION, SOLUTION EPIDURAL; INFILTRATION; INTRACAUDAL; PERINEURAL AS NEEDED
Status: DISCONTINUED | OUTPATIENT
Start: 2018-07-09 | End: 2018-07-09 | Stop reason: HOSPADM

## 2018-07-09 RX ORDER — AMOXICILLIN 250 MG
1 CAPSULE ORAL 2 TIMES DAILY
Status: DISCONTINUED | OUTPATIENT
Start: 2018-07-10 | End: 2018-07-15

## 2018-07-09 RX ORDER — AMIODARONE HYDROCHLORIDE 200 MG/1
400 TABLET ORAL EVERY 12 HOURS
Status: DISCONTINUED | OUTPATIENT
Start: 2018-07-10 | End: 2018-07-09

## 2018-07-09 RX ORDER — SODIUM CHLORIDE 0.9 % (FLUSH) 0.9 %
10 SYRINGE (ML) INJECTION EVERY 24 HOURS
Status: DISCONTINUED | OUTPATIENT
Start: 2018-07-09 | End: 2018-07-11

## 2018-07-09 RX ORDER — SODIUM CHLORIDE 0.9 % (FLUSH) 0.9 %
5-10 SYRINGE (ML) INJECTION EVERY 8 HOURS
Status: DISCONTINUED | OUTPATIENT
Start: 2018-07-09 | End: 2018-07-09 | Stop reason: HOSPADM

## 2018-07-09 RX ORDER — PROPOFOL 10 MG/ML
INJECTION, EMULSION INTRAVENOUS AS NEEDED
Status: DISCONTINUED | OUTPATIENT
Start: 2018-07-09 | End: 2018-07-10 | Stop reason: HOSPADM

## 2018-07-09 RX ORDER — SODIUM CHLORIDE 9 MG/ML
9 INJECTION, SOLUTION INTRAVENOUS CONTINUOUS
Status: DISCONTINUED | OUTPATIENT
Start: 2018-07-09 | End: 2018-07-11

## 2018-07-09 RX ORDER — INSULIN GLARGINE 100 [IU]/ML
1-50 INJECTION, SOLUTION SUBCUTANEOUS
Status: ACTIVE | OUTPATIENT
Start: 2018-07-09 | End: 2018-07-10

## 2018-07-09 RX ORDER — NEOSTIGMINE METHYLSULFATE 1 MG/ML
INJECTION INTRAVENOUS AS NEEDED
Status: DISCONTINUED | OUTPATIENT
Start: 2018-07-09 | End: 2018-07-10 | Stop reason: HOSPADM

## 2018-07-09 RX ORDER — PAPAVERINE HYDROCHLORIDE 30 MG/ML
10 INJECTION INTRAMUSCULAR; INTRAVENOUS ONCE
Status: COMPLETED | OUTPATIENT
Start: 2018-07-09 | End: 2018-07-09

## 2018-07-09 RX ORDER — CEFAZOLIN SODIUM/WATER 2 G/20 ML
2 SYRINGE (ML) INTRAVENOUS ONCE
Status: DISCONTINUED | OUTPATIENT
Start: 2018-07-09 | End: 2018-07-09 | Stop reason: HOSPADM

## 2018-07-09 RX ORDER — MAGNESIUM SULFATE 100 %
4 CRYSTALS MISCELLANEOUS AS NEEDED
Status: DISCONTINUED | OUTPATIENT
Start: 2018-07-09 | End: 2018-07-18 | Stop reason: HOSPADM

## 2018-07-09 RX ORDER — SODIUM CHLORIDE, SODIUM LACTATE, POTASSIUM CHLORIDE, CALCIUM CHLORIDE 600; 310; 30; 20 MG/100ML; MG/100ML; MG/100ML; MG/100ML
INJECTION, SOLUTION INTRAVENOUS
Status: DISCONTINUED | OUTPATIENT
Start: 2018-07-09 | End: 2018-07-10 | Stop reason: HOSPADM

## 2018-07-09 RX ORDER — ROPIVACAINE HYDROCHLORIDE 5 MG/ML
30 INJECTION, SOLUTION EPIDURAL; INFILTRATION; PERINEURAL AS NEEDED
Status: DISCONTINUED | OUTPATIENT
Start: 2018-07-09 | End: 2018-07-09 | Stop reason: HOSPADM

## 2018-07-09 RX ORDER — SODIUM CHLORIDE 0.9 % (FLUSH) 0.9 %
5-10 SYRINGE (ML) INJECTION EVERY 8 HOURS
Status: DISCONTINUED | OUTPATIENT
Start: 2018-07-09 | End: 2018-07-11

## 2018-07-09 RX ORDER — MORPHINE SULFATE 1 MG/ML
4 INJECTION, SOLUTION EPIDURAL; INTRATHECAL; INTRAVENOUS
Status: DISCONTINUED | OUTPATIENT
Start: 2018-07-09 | End: 2018-07-11

## 2018-07-09 RX ORDER — CHLORHEXIDINE GLUCONATE 1.2 MG/ML
10 RINSE ORAL 2 TIMES DAILY
Status: DISCONTINUED | OUTPATIENT
Start: 2018-07-09 | End: 2018-07-18 | Stop reason: HOSPADM

## 2018-07-09 RX ORDER — GLYCOPYRROLATE 0.2 MG/ML
INJECTION INTRAMUSCULAR; INTRAVENOUS AS NEEDED
Status: DISCONTINUED | OUTPATIENT
Start: 2018-07-09 | End: 2018-07-10 | Stop reason: HOSPADM

## 2018-07-09 RX ORDER — SUFENTANIL CITRATE 50 UG/ML
INJECTION EPIDURAL; INTRAVENOUS
Status: DISCONTINUED | OUTPATIENT
Start: 2018-07-09 | End: 2018-07-10 | Stop reason: HOSPADM

## 2018-07-09 RX ORDER — FAMOTIDINE 20 MG/1
20 TABLET, FILM COATED ORAL DAILY
Status: DISCONTINUED | OUTPATIENT
Start: 2018-07-10 | End: 2018-07-17

## 2018-07-09 RX ORDER — MIDAZOLAM HYDROCHLORIDE 1 MG/ML
1 INJECTION, SOLUTION INTRAMUSCULAR; INTRAVENOUS
Status: DISCONTINUED | OUTPATIENT
Start: 2018-07-09 | End: 2018-07-10

## 2018-07-09 RX ORDER — SODIUM CHLORIDE 0.9 % (FLUSH) 0.9 %
20 SYRINGE (ML) INJECTION AS NEEDED
Status: DISCONTINUED | OUTPATIENT
Start: 2018-07-09 | End: 2018-07-11

## 2018-07-09 RX ORDER — ALBUMIN HUMAN 50 G/1000ML
SOLUTION INTRAVENOUS AS NEEDED
Status: DISCONTINUED | OUTPATIENT
Start: 2018-07-09 | End: 2018-07-10 | Stop reason: HOSPADM

## 2018-07-09 RX ORDER — CEFAZOLIN SODIUM 1 G/3ML
INJECTION, POWDER, FOR SOLUTION INTRAMUSCULAR; INTRAVENOUS AS NEEDED
Status: DISCONTINUED | OUTPATIENT
Start: 2018-07-09 | End: 2018-07-10 | Stop reason: HOSPADM

## 2018-07-09 RX ORDER — MIDAZOLAM HYDROCHLORIDE 1 MG/ML
1 INJECTION, SOLUTION INTRAMUSCULAR; INTRAVENOUS AS NEEDED
Status: DISCONTINUED | OUTPATIENT
Start: 2018-07-09 | End: 2018-07-09 | Stop reason: HOSPADM

## 2018-07-09 RX ORDER — DEXMEDETOMIDINE HYDROCHLORIDE 4 UG/ML
INJECTION, SOLUTION INTRAVENOUS
Status: DISCONTINUED | OUTPATIENT
Start: 2018-07-09 | End: 2018-07-10 | Stop reason: HOSPADM

## 2018-07-09 RX ORDER — SODIUM CHLORIDE 9 MG/ML
INJECTION, SOLUTION INTRAVENOUS
Status: DISCONTINUED | OUTPATIENT
Start: 2018-07-09 | End: 2018-07-10 | Stop reason: HOSPADM

## 2018-07-09 RX ORDER — SODIUM CHLORIDE, SODIUM LACTATE, POTASSIUM CHLORIDE, CALCIUM CHLORIDE 600; 310; 30; 20 MG/100ML; MG/100ML; MG/100ML; MG/100ML
25 INJECTION, SOLUTION INTRAVENOUS CONTINUOUS
Status: DISCONTINUED | OUTPATIENT
Start: 2018-07-09 | End: 2018-07-09 | Stop reason: HOSPADM

## 2018-07-09 RX ORDER — ALBUTEROL SULFATE 0.83 MG/ML
2.5 SOLUTION RESPIRATORY (INHALATION)
Status: DISCONTINUED | OUTPATIENT
Start: 2018-07-09 | End: 2018-07-18 | Stop reason: HOSPADM

## 2018-07-09 RX ORDER — POLYETHYLENE GLYCOL 3350 17 G/17G
17 POWDER, FOR SOLUTION ORAL DAILY
Status: DISCONTINUED | OUTPATIENT
Start: 2018-07-10 | End: 2018-07-18 | Stop reason: HOSPADM

## 2018-07-09 RX ORDER — INSULIN LISPRO 100 [IU]/ML
INJECTION, SOLUTION INTRAVENOUS; SUBCUTANEOUS
Status: DISCONTINUED | OUTPATIENT
Start: 2018-07-09 | End: 2018-07-18 | Stop reason: HOSPADM

## 2018-07-09 RX ORDER — ALBUMIN HUMAN 50 G/1000ML
12.5 SOLUTION INTRAVENOUS
Status: COMPLETED | OUTPATIENT
Start: 2018-07-09 | End: 2018-07-09

## 2018-07-09 RX ORDER — LIDOCAINE HYDROCHLORIDE 20 MG/ML
INJECTION, SOLUTION EPIDURAL; INFILTRATION; INTRACAUDAL; PERINEURAL AS NEEDED
Status: DISCONTINUED | OUTPATIENT
Start: 2018-07-09 | End: 2018-07-10 | Stop reason: HOSPADM

## 2018-07-09 RX ORDER — GUAIFENESIN 100 MG/5ML
81 LIQUID (ML) ORAL DAILY
Status: DISCONTINUED | OUTPATIENT
Start: 2018-07-10 | End: 2018-07-18 | Stop reason: HOSPADM

## 2018-07-09 RX ORDER — INSULIN LISPRO 100 [IU]/ML
INJECTION, SOLUTION INTRAVENOUS; SUBCUTANEOUS
Status: DISCONTINUED | OUTPATIENT
Start: 2018-07-09 | End: 2018-07-13

## 2018-07-09 RX ORDER — CEFAZOLIN SODIUM/WATER 2 G/20 ML
2 SYRINGE (ML) INTRAVENOUS EVERY 8 HOURS
Status: DISCONTINUED | OUTPATIENT
Start: 2018-07-09 | End: 2018-07-09

## 2018-07-09 RX ORDER — CEFAZOLIN SODIUM 1 G/3ML
1 INJECTION, POWDER, FOR SOLUTION INTRAMUSCULAR; INTRAVENOUS ONCE
Status: COMPLETED | OUTPATIENT
Start: 2018-07-09 | End: 2018-07-09

## 2018-07-09 RX ORDER — HEPARIN SODIUM 1000 [USP'U]/ML
2000 INJECTION, SOLUTION INTRAVENOUS; SUBCUTANEOUS ONCE
Status: COMPLETED | OUTPATIENT
Start: 2018-07-09 | End: 2018-07-09

## 2018-07-09 RX ORDER — SODIUM CHLORIDE 0.9 % (FLUSH) 0.9 %
10 SYRINGE (ML) INJECTION EVERY 8 HOURS
Status: DISCONTINUED | OUTPATIENT
Start: 2018-07-09 | End: 2018-07-11

## 2018-07-09 RX ORDER — SODIUM CHLORIDE 0.9 % (FLUSH) 0.9 %
5-10 SYRINGE (ML) INJECTION AS NEEDED
Status: DISCONTINUED | OUTPATIENT
Start: 2018-07-09 | End: 2018-07-09 | Stop reason: HOSPADM

## 2018-07-09 RX ORDER — MAGNESIUM SULFATE 1 G/100ML
1 INJECTION INTRAVENOUS AS NEEDED
Status: DISCONTINUED | OUTPATIENT
Start: 2018-07-09 | End: 2018-07-11

## 2018-07-09 RX ORDER — SODIUM CHLORIDE 450 MG/100ML
10 INJECTION, SOLUTION INTRAVENOUS CONTINUOUS
Status: DISCONTINUED | OUTPATIENT
Start: 2018-07-09 | End: 2018-07-11

## 2018-07-09 RX ORDER — HEPARIN SODIUM 1000 [USP'U]/ML
INJECTION, SOLUTION INTRAVENOUS; SUBCUTANEOUS AS NEEDED
Status: DISCONTINUED | OUTPATIENT
Start: 2018-07-09 | End: 2018-07-10 | Stop reason: HOSPADM

## 2018-07-09 RX ORDER — ACETAMINOPHEN 325 MG/1
650 TABLET ORAL EVERY 4 HOURS
Status: DISCONTINUED | OUTPATIENT
Start: 2018-07-09 | End: 2018-07-10

## 2018-07-09 RX ORDER — SUFENTANIL CITRATE 50 UG/ML
INJECTION EPIDURAL; INTRAVENOUS AS NEEDED
Status: DISCONTINUED | OUTPATIENT
Start: 2018-07-09 | End: 2018-07-10 | Stop reason: HOSPADM

## 2018-07-09 RX ORDER — DEXTROSE 50 % IN WATER (D50W) INTRAVENOUS SYRINGE
12.5-25 AS NEEDED
Status: DISCONTINUED | OUTPATIENT
Start: 2018-07-09 | End: 2018-07-18 | Stop reason: HOSPADM

## 2018-07-09 RX ORDER — OXYCODONE AND ACETAMINOPHEN 5; 325 MG/1; MG/1
2 TABLET ORAL
Status: DISCONTINUED | OUTPATIENT
Start: 2018-07-09 | End: 2018-07-09

## 2018-07-09 RX ORDER — FACIAL-BODY WIPES
10 EACH TOPICAL DAILY PRN
Status: DISCONTINUED | OUTPATIENT
Start: 2018-07-09 | End: 2018-07-18 | Stop reason: HOSPADM

## 2018-07-09 RX ORDER — NALOXONE HYDROCHLORIDE 0.4 MG/ML
0.4 INJECTION, SOLUTION INTRAMUSCULAR; INTRAVENOUS; SUBCUTANEOUS AS NEEDED
Status: DISCONTINUED | OUTPATIENT
Start: 2018-07-09 | End: 2018-07-11

## 2018-07-09 RX ORDER — ROCURONIUM BROMIDE 10 MG/ML
INJECTION, SOLUTION INTRAVENOUS AS NEEDED
Status: DISCONTINUED | OUTPATIENT
Start: 2018-07-09 | End: 2018-07-10 | Stop reason: HOSPADM

## 2018-07-09 RX ADMIN — HEPARIN SODIUM 20000 UNITS: 1000 INJECTION, SOLUTION INTRAVENOUS; SUBCUTANEOUS at 08:47

## 2018-07-09 RX ADMIN — ACETAMINOPHEN 1000 MG: 10 INJECTION, SOLUTION INTRAVENOUS at 18:38

## 2018-07-09 RX ADMIN — Medication 10 ML: at 19:23

## 2018-07-09 RX ADMIN — Medication 2 MG: at 17:59

## 2018-07-09 RX ADMIN — FENTANYL CITRATE 50 MCG: 50 INJECTION, SOLUTION INTRAMUSCULAR; INTRAVENOUS at 06:45

## 2018-07-09 RX ADMIN — SODIUM CHLORIDE, SODIUM LACTATE, POTASSIUM CHLORIDE, CALCIUM CHLORIDE: 600; 310; 30; 20 INJECTION, SOLUTION INTRAVENOUS at 07:31

## 2018-07-09 RX ADMIN — SUFENTANIL CITRATE 20 MCG: 50 INJECTION EPIDURAL; INTRAVENOUS at 07:41

## 2018-07-09 RX ADMIN — CEFAZOLIN SODIUM 2 G: 1 INJECTION, POWDER, FOR SOLUTION INTRAMUSCULAR; INTRAVENOUS at 11:01

## 2018-07-09 RX ADMIN — ACETAMINOPHEN 1000 MG: 10 INJECTION, SOLUTION INTRAVENOUS at 23:31

## 2018-07-09 RX ADMIN — ALBUMIN HUMAN 250 ML: 50 SOLUTION INTRAVENOUS at 11:08

## 2018-07-09 RX ADMIN — HEPARIN SODIUM 2000 UNITS: 1000 INJECTION, SOLUTION INTRAVENOUS; SUBCUTANEOUS at 08:18

## 2018-07-09 RX ADMIN — HEPARIN SODIUM 2000 UNITS: 1000 INJECTION, SOLUTION INTRAVENOUS; SUBCUTANEOUS at 07:00

## 2018-07-09 RX ADMIN — SODIUM CHLORIDE: 900 IRRIGANT IRRIGATION at 07:00

## 2018-07-09 RX ADMIN — AMIODARONE HYDROCHLORIDE 1 MG/MIN: 50 INJECTION, SOLUTION INTRAVENOUS at 15:12

## 2018-07-09 RX ADMIN — SODIUM CHLORIDE, SODIUM LACTATE, POTASSIUM CHLORIDE, AND CALCIUM CHLORIDE 25 ML/HR: 600; 310; 30; 20 INJECTION, SOLUTION INTRAVENOUS at 06:25

## 2018-07-09 RX ADMIN — FAMOTIDINE 20 MG: 10 INJECTION, SOLUTION INTRAVENOUS at 12:17

## 2018-07-09 RX ADMIN — Medication 2 G: at 12:17

## 2018-07-09 RX ADMIN — DEXMEDETOMIDINE HYDROCHLORIDE 0.5 MCG/KG/HR: 4 INJECTION, SOLUTION INTRAVENOUS at 09:44

## 2018-07-09 RX ADMIN — ALBUMIN (HUMAN) 12.5 G: 12.5 INJECTION, SOLUTION INTRAVENOUS at 12:58

## 2018-07-09 RX ADMIN — Medication 2 MG: at 16:19

## 2018-07-09 RX ADMIN — PROPOFOL 100 MG: 10 INJECTION, EMULSION INTRAVENOUS at 07:41

## 2018-07-09 RX ADMIN — LIDOCAINE HYDROCHLORIDE 100 MG: 20 INJECTION, SOLUTION EPIDURAL; INFILTRATION; INTRACAUDAL; PERINEURAL at 07:41

## 2018-07-09 RX ADMIN — SUFENTANIL CITRATE 10 MCG: 50 INJECTION EPIDURAL; INTRAVENOUS at 12:05

## 2018-07-09 RX ADMIN — ALBUMIN (HUMAN) 12.5 G: 12.5 INJECTION, SOLUTION INTRAVENOUS at 12:15

## 2018-07-09 RX ADMIN — AMIODARONE HYDROCHLORIDE 1 MG/MIN: 50 INJECTION, SOLUTION INTRAVENOUS at 21:51

## 2018-07-09 RX ADMIN — Medication 2 MG: at 12:16

## 2018-07-09 RX ADMIN — PROTAMINE SULFATE 250 MG: 10 INJECTION, SOLUTION INTRAVENOUS at 10:27

## 2018-07-09 RX ADMIN — SUFENTANIL CITRATE 0.3 MCG/KG/HR: 50 INJECTION EPIDURAL; INTRAVENOUS at 07:47

## 2018-07-09 RX ADMIN — CEFAZOLIN 1 G: 1 INJECTION, POWDER, FOR SOLUTION INTRAVENOUS at 07:00

## 2018-07-09 RX ADMIN — SODIUM CHLORIDE 5 MG/HR: 900 INJECTION, SOLUTION INTRAVENOUS at 12:30

## 2018-07-09 RX ADMIN — MIDAZOLAM HYDROCHLORIDE 3 MG: 1 INJECTION, SOLUTION INTRAMUSCULAR; INTRAVENOUS at 07:31

## 2018-07-09 RX ADMIN — Medication 10 ML: at 21:27

## 2018-07-09 RX ADMIN — ALBUMIN (HUMAN) 12.5 G: 12.5 INJECTION, SOLUTION INTRAVENOUS at 19:18

## 2018-07-09 RX ADMIN — PAPAVERINE HYDROCHLORIDE 120 MG: 30 INJECTION, SOLUTION INTRAVENOUS at 07:00

## 2018-07-09 RX ADMIN — Medication 2 G: at 18:00

## 2018-07-09 RX ADMIN — NEOSTIGMINE METHYLSULFATE 3 MG: 1 INJECTION INTRAVENOUS at 12:00

## 2018-07-09 RX ADMIN — ROCURONIUM BROMIDE 100 MG: 10 INJECTION, SOLUTION INTRAVENOUS at 07:41

## 2018-07-09 RX ADMIN — SODIUM CHLORIDE 10 ML/HR: 450 INJECTION, SOLUTION INTRAVENOUS at 12:46

## 2018-07-09 RX ADMIN — SODIUM CHLORIDE: 9 INJECTION, SOLUTION INTRAVENOUS at 07:31

## 2018-07-09 RX ADMIN — CEFAZOLIN SODIUM 2 G: 1 INJECTION, POWDER, FOR SOLUTION INTRAMUSCULAR; INTRAVENOUS at 08:07

## 2018-07-09 RX ADMIN — MUPIROCIN: 20 OINTMENT TOPICAL at 19:21

## 2018-07-09 RX ADMIN — GLYCOPYRROLATE 0.5 MG: 0.2 INJECTION INTRAMUSCULAR; INTRAVENOUS at 12:00

## 2018-07-09 NOTE — BRIEF OP NOTE
BRIEF OP NOTE  Pre-Op Diagnosis: CAD     Post-Op Diagnosis: CAD       Procedure:  Coronary Artery Bypass Grafting x 3, LIMA to LAD, RSVG to OM, RSVG to RCA  Right GSV EVH    Surgeon: Johana Robert MD    Assistant(s): SCAR Thomas    Anesthesia: General     Infusions: Amiodarone, precedex, insulin, nadege    Estimated Blood Loss: 300cc    Cell Saver: 450cc    Specimens: * No specimens in log *    Drains and pacing wires: 2 atrial wires, 1 bipolar ventricular wire, 3 valentin drains    Complications: None    Findings: CAD    Implants: * No implants in log *

## 2018-07-09 NOTE — PROGRESS NOTES
This pt was seen in office for anemia  Needs B12 and IV iron  But now in hospital post CABG  So ?  See there vs outpt

## 2018-07-09 NOTE — ANESTHESIA PROCEDURE NOTES
Arterial Line Placement    Start time: 7/9/2018 6:56 AM  End time: 7/9/2018 7:04 AM  Performed by: Ana Quispe  Authorized by: Pastora CASANOVA     Pre-Procedure  Indications:  Arterial pressure monitoring  Preanesthetic Checklist: patient identified, risks and benefits discussed, anesthesia consent, site marked, patient being monitored, timeout performed and patient being monitored      Procedure:   Prep:  Chlorhexidine  Seldinger Technique?: Yes    Orientation:  Right  Location:  Radial artery  Catheter size:  20 G  Number of attempts:  2  Cont Cardiac Output Sensor: No      Assessment:   Post-procedure:  Line secured and sterile dressing applied  Patient Tolerance:  Patient tolerated the procedure well with no immediate complications

## 2018-07-09 NOTE — OP NOTES
Coronary Artery Bypass Graft Procedure Note      Pre-operative Diagnosis:                                              Disease of the native coronary arteries                                              Left main CAD                                             Stage III chronic kidney disease                                             Anemia of undetermined etiology     Post-operative Diagnosis: same    Surgeon: Blanca Qiu MD    Assistant: SCAR Sandhu    Anesthesia: General endotracheal anesthesia    EBL:  See perfusion record    Specimen none    Implants none    Complications none        Operation: Coronary Bypass Grafting Procedure(s):  CORONARY ARTERY BYPASS GRAFT x 3 with Left Internal Mammary Artery and Right Greater Saphenous Vein - Transesphageal EchoCardiogram and EpiAortic Ultrsound performed by Dr. Zena PULLIAM to the LAD  SVG to the OM  SVG to the distal RCA     Operative Findings: At the time of the procedure there was moderate depression ov LV function . The coronary arteries were diffusely diseased The EKATERINA was good quality with good flow. The vein was of good quality. Indications:  See pre op diagnosis     Procedure Details     After informed consent was obtained for the above mentioned procedure, the patient was then taken to the operating room. Appropriate monitoring lines were placed by the Anesthesia Department. A complete surgical timeout was completed. The KIARRA report was reviewed with anesthesia. After administration of general endotracheal anesthesia, the patient was prepped and draped in the usual sterile fashion. The chest was then entered through a standard mediastinotomy incision while simultaneously harvesting of peripheral conduit was undertaken using endoscopic technique following a bolus of heparin. After harvesting the conduit, it was inspected and noted to be adequate. The epiaortic ultrasound was performed and reviewed.  The left internal mammary harvested was then undertaken in pedicle distal pedicle was incised and noted to bleed briskly. EKATERINA was prepared with papaverine. The pericardium was then opened and tacked up into a pericardial well. The patient was heparinized. Pursestring sutures were then placed into the aorta, the right atrial appendage. The patient was then cannulated through these pursestrings at which point, cardiopulmonary bypass was started. A retrograde coronary sinus canula was placed for cardioplegia administration. A cross-clamp was then applied. Cardioplegia was administered initially and then given intermittently throughout the case. Initial attention was directed at the circumflex vessels. SVG to the OM . Attention was then directed to the RCA. SVG to the distal RCA. The LIMA was used to graft the LAD. The proximal anastomosis of the vein graphs constructed to the ascending aorta. A warm dose of cardioplegia administered. The patient was weaned from bypass. Protamine was administered. The aortic cannula and venous cannula removed. The sites were reinforced. Ventricular and atrial pacing wires were then placed upon the heart, brought out through stab wounds inferiorly, and affixed to the skin. Mediastinal and L pleural chest tubes placed. . Hemostasis was assured, the sternum was reapproximated with heavy gauge stainless steel wire. The midline fascia was subsequently closed separately. Vicryl was then used in a running fashion for subcutaneous tissue and skin. Dressings were then applied to all wounds. The patient was then transported with monitors to the intensive care unit, intubated and ventilated.                  Johan Serrato MD

## 2018-07-09 NOTE — PROGRESS NOTES
1150 TRANSFER - IN REPORT:    Verbal report received from Tova ABBOTT (name) on Gallo Hernandez  being received from [facility](unit) for routine post - op      Report consisted of patients Situation, Background, Assessment and   Recommendations(SBAR). Information from the following report(s) SBAR, OR Summary, Intake/Output, MAR, Recent Results and Cardiac Rhythm PACED was reviewed with the receiving nurse. Opportunity for questions and clarification was provided. Assessment completed upon patients arrival to unit and care assumed. 1213:  ABG's done pt placed on 1/2 rate weaning begun. 1230 Family in  [address] drip started 1300 then off very soon after. 1338: Extubated Precedex decreasd to 0.2 mcg/kg/hr   Update given to JESSE ABBOTT Orders received. Pacemaker now AAI decreased to 70 SR underneath    2000: Bedside and Verbal shift change report given to Diallo Lea RN  (oncoming nurse) by Sowmya Gurrola RN  (offgoing nurse). Report included the following information OR Summary, Procedure Summary, Intake/Output, MAR and Cardiac Rhythm PACED.

## 2018-07-09 NOTE — IP AVS SNAPSHOT
[address] 
[phone] Patient: Raegan Bay MRN: [id number] DLD:[id number] A check ritu indicates which time of day the medication should be taken. My Medications START taking these medications Instructions Each Dose to Equal  
 Morning Noon Evening Bedtime  
 acetaminophen 325 mg tablet Commonly known as:  TYLENOL Your last dose was: Your next dose is: Take 2 Tabs by mouth every six (6) hours as needed for Pain. May purchase over the counter 650 mg  
    
   
   
   
  
 ascorbic acid (vitamin C) 500 mg tablet Commonly known as:  VITAMIN C Your last dose was: Your next dose is: Take 1 Tab by mouth daily. 500 mg  
    
   
   
   
  
 aspirin 81 mg chewable tablet Your last dose was: Your next dose is: Take 1 Tab by mouth daily. 81 mg  
    
   
   
   
  
 cyanocobalamin 500 mcg tablet Commonly known as:  VITAMIN B12 Your last dose was: Your next dose is: Take 1 Tab by mouth daily. 500 mcg  
    
   
   
   
  
 ferrous sulfate 324 mg (65 mg iron) tablet Your last dose was: Your next dose is: Take 1 Tab by mouth Daily (before breakfast). Indications: Iron Deficiency Anemia 324 mg  
    
   
   
   
  
 folic acid 1 mg tablet Commonly known as:  Google Your last dose was: Your next dose is: Take 1 Tab by mouth daily. 1 mg  
    
   
   
   
  
 furosemide 40 mg tablet Commonly known as:  LASIX Your last dose was: Your next dose is: Take 1 Tab by mouth daily for 5 days. 40 mg  
    
   
   
   
  
 insulin glargine 100 unit/mL injection Commonly known as:  LANTUS Your last dose was: Your next dose is:    
   
   
 25 Units by SubCUTAneous route daily. 25 Units oxyCODONE-acetaminophen 5-325 mg per tablet Commonly known as:  PERCOCET Your last dose was: Your next dose is: Take 1 Tab by mouth every four (4) hours as needed for Pain. Max Daily Amount: 6 Tabs. 1 Tab  
    
   
   
   
  
 senna-docusate 8.6-50 mg per tablet Commonly known as:  Leellen Cancel Your last dose was: Your next dose is: Take 1 Tab by mouth daily. 1 Tab CHANGE how you take these medications Instructions Each Dose to Equal  
 Morning Noon Evening Bedtime  
 glipiZIDE 10 mg tablet Commonly known as:  Maxcine Del Rosario What changed:  See the new instructions. Your last dose was: Your next dose is: Take 0.5 Tabs by mouth two (2) times a day. Different dose than what you were taking 5 mg  
    
   
   
   
  
 metoprolol tartrate 100 mg IR tablet Commonly known as:  LOPRESSOR What changed:   
- medication strength 
- how much to take 
- additional instructions Your last dose was: Your next dose is: Take 1 Tab by mouth every twelve (12) hours for 60 days. Different dose than what you were taking 100 mg CONTINUE taking these medications Instructions Each Dose to Equal  
 Morning Noon Evening Bedtime  
 atorvastatin 20 mg tablet Commonly known as:  LIPITOR Your last dose was: Your next dose is: Take 1 Tab by mouth nightly. 20 mg  
    
   
   
   
  
 glucose blood VI test strips strip Commonly known as:  Shelley Long Your last dose was: Your next dose is:    
   
   
 Check sugar once daily  
     
   
   
   
  
 hydroCHLOROthiazide 25 mg tablet Commonly known as:  HYDRODIURIL Your last dose was: Your next dose is: Take 25 mg by mouth daily (after breakfast). 25 mg  
    
   
   
   
  
 JANUVIA 50 mg tablet Generic drug:  SITagliptin Your last dose was: Your next dose is: TAKE ONE TABLET BY MOUTH ONCE DAILY  
     
   
   
   
  
 lancets 33 gauge Misc Commonly known as: One Touch Shipman Dings Your last dose was: Your next dose is:    
   
   
 Check sugar once daily  
     
   
   
   
  
 losartan 50 mg tablet Commonly known as:  COZAAR Your last dose was: Your next dose is: Take 2 Tabs by mouth daily. 100 mg  
    
   
   
   
  
 pantoprazole 40 mg tablet Commonly known as:  PROTONIX Your last dose was: Your next dose is: Take 40 mg by mouth daily. 40 mg  
    
   
   
   
  
  
STOP taking these medications   
 amiodarone 400 mg tablet Commonly known as:  PACERONE  
   
  
 chlorhexidine 0.12 % solution Commonly known as:  PERIDEX  
   
  
 metFORMIN 1,000 mg tablet Commonly known as:  GLUCOPHAGE  
   
  
 mupirocin 2 % ointment Commonly known as:  BACTROBAN  
   
  
 nitroglycerin 0.4 mg SL tablet Commonly known as:  NITROSTAT Where to Get Your Medications These medications were sent to [address] [place] [address], [address] [facility] [address], [address] [place] [id number] Phone:  [phone]  
  ascorbic acid (vitamin C) 500 mg tablet  
 aspirin 81 mg chewable tablet  
 cyanocobalamin 500 mcg tablet  
 ferrous sulfate 324 mg (65 mg iron) tablet  
 folic acid 1 mg tablet  
 furosemide 40 mg tablet  
 glipiZIDE 10 mg tablet  
 insulin glargine 100 unit/mL injection  
 losartan 50 mg tablet  
 metoprolol tartrate 100 mg IR tablet  
 senna-docusate 8.6-50 mg per tablet Information on where to get these meds will be given to you by the nurse or doctor. ! Ask your nurse or doctor about these medications  
  acetaminophen 325 mg tablet  
 oxyCODONE-acetaminophen 5-325 mg per tablet

## 2018-07-09 NOTE — DIABETES MGMT
[facility] Cardiac Surgery Progress Note     Recommendations/ Comments:  Pt arrived to CVICU at 1143 on 7-9-18. Consider continuing insulin gtt for at least 48hrs post-op and eating 50% solid foods then,  1) transition off gtt per [employer] Protocol   2) continue accu-checks and humalog correctional insulin ac & hs   3) ADA/AHA diet as diet advanced  4) resume glipizide once eating and off of drip    Insulin gtt should not be stopped until after 7-11-16 at 1143 to complete 48hr post-op time frame. Currently on insulin gtt. Current drip rate is 1.4 units per hour and blood sugar at 1157 was 108 mg/dl. Chart reviewed on [facility]. Patient is 78 y.o. male s/p Cardiac surgery  POD 0. Pt with a known history of diabetes on Metformin 1000 mg BID, Glipizide 10 mg BID and Januvia 50 mg at home. A1c:   Lab Results   Component Value Date/Time    Hemoglobin A1c 7.4 (H) 06/22/2018 01:53 PM         Recent Glucose Results:   Lab Results   Component Value Date/Time    GLUCPOC 181 (H) 07/09/2018 06:33 AM        Lab Results   Component Value Date/Time    Creatinine 1.46 (H) 07/06/2018 02:39 PM     Estimated Creatinine Clearance: 40.1 mL/min (based on Cr of 1.46). Active Orders   Diet    DIET NPO        PO intake: No data found. Will continue to follow as needed. Thank you.   Dinah Arevalo, RD, CDE

## 2018-07-09 NOTE — ANESTHESIA PROCEDURE NOTES
KIARRA  Date/Time: 7/9/2018 1:48 PM      Procedure Details: probe placement, image aquisition & interpretation    Risks and benefits discussed with the patient and plans are to proceed    Procedure Note    Performed by: Ana Quispe  Authorized by: Pastora CASANOVA       Indications: assessment of ascending aorta and assessment of surgical repair  Modalities: CF, CWD, PWD  Probe Type: multiplane  Insertion: atraumatic  Patient Status: intubated and sedated    Echocardiographic and Doppler Measurements   Aorta  Size  Diam(cm)  Dissection PlaqueThick(mm)  Plaque Mobile    Ascending normal  No 0-3 No    Arch normal  No 0-3 No    Descending normal  No >3 No          Valves  Annulus  Stenosis  Area/Grad  Regurg  Leaflet   Morph  Leaflet   Motion    Aortic calcified mild 24/11, HUY 1.45 by continuity equation  thickened normal    Mitral  none  2+ calcified, thickened normal    Tricuspid normal none  1+ normal normal          Atria  Size  SEC (smoke)  Thrombus  Tumor  Device    Rt Atrium normal No No No No    Lt Atrium normal No No No No     Interatrial Septum Morphology: normal    Interventricular Septum Morphology: normal    Ventricle  Cavity Size  Cavity Dimension Hypertrophy  Thrombus  Gloal FXN  EF    RV normal  No no normal     LV normal   No normal 55%       Regional Function  (1 = normal, 2 = mildly hypokinetic, 3 = severely hypokinetic, 4 = akinetic, 5 = dyskinetic) LAV - Long [place]   ME LAV = 0  ME LAV = 90  ME LAV = 130   Basal Sept:1 Basal Ant:1 Basal Post:1   Mid Sept:1 Mid Ant:1 Mid Post:1   Apical Sept:1 Apical Ant:1 Basal Ant Sept:1   Basal Lat:1 Basal Inf:1 Mid Ant Sept:1   Mid Lat:1 Mid Inf:1    Apical Lat:1 Apical Inf:1        Pericardium: normal    Post Intervention Follow-up Study  Ventricular Global Function: unchanged  Ventricular Regional Function: unchanged     Valve  Function  Regurgitation  Area    Aortic no change      Mitral no change      Tricuspid no change      Prosthetic Complications: None  Comments: Epiaortic reveals several small focal 2-3 mm calcifications in the proximal lateral wall, clamp and cannulation sites appear clear, diffuse intimal thickening

## 2018-07-09 NOTE — ANESTHESIA PROCEDURE NOTES
Central Line and Pulmonary Artery Catheter Placement    Start time: 7/9/2018 7:07 AM  End time: 7/9/2018 7:22 AM  Performed by: Eliud Wiseman  Authorized by: Sable Burkitt D     Indications: vascular access, central pressure monitoring and need for vasopressors  Preanesthetic Checklist: patient identified, risks and benefits discussed, anesthesia consent, site marked, patient being monitored and timeout performed      Pre-procedure: All elements of maximal sterile barrier technique followed?  Yes    2% Chlorhexidine for cutaneous antisepsis, Hand hygiene performed prior to catheter insertion and Ultrasound guidance    Sterile Ultrasound Technique followed?: Yes            Procedure:   Prep:  Chlorhexidine  Location:  Internal jugular  Orientation:  Right  Patient position:  Trendelenburg  Catheter type:  Double lumen  Catheter size:  9 Fr  Catheter length:  12 cm  Number of attempts:  1  Successful placement: Yes      Assessment:   Post-procedure:  Catheter secured and sterile dressing with CHG applied  Assessment:  Blood return through all ports  Insertion:  Uncomplicated  Patient tolerance:  Patient tolerated the procedure well with no immediate complications  9 Fr MAC and 8 Fr CCO PA Catheter

## 2018-07-09 NOTE — H&P
Date of Surgery Update:  Beatrice Kirkland was seen and examined. History and physical has been reviewed. The patient has been examined.  There have been no significant clinical changes since the completion of the originally dated History and Physical.    Signed By: SCAR Arango     July 9, 2018 7:09 AM

## 2018-07-09 NOTE — PERIOP NOTES
TRANSFER - OUT REPORT:    Verbal report given to Jess Hinds on Carlos Bas  being transferred to CVICU for routine post - op       Report consisted of patients Situation, Background, Assessment and   Recommendations(SBAR). Information from the following report(s) OR Summary, history, lines, drips, allergies, drains was reviewed with the receiving nurse. Opportunity for questions and clarification was provided.       Patient transported with:   Monitor  O2 @ 10 liters  Registered Nurse   CRNA  Perfusion

## 2018-07-09 NOTE — IP AVS SNAPSHOT
[address] [facility] [address] 
[phone] Patient: Kirstin Bruno MRN: [id number] BCS:[birth year] About your hospitalization You were admitted on:  July 9, 2018 You last received care in the:  [facility] 4 CV SERVICES UNIT You were discharged on:  July 18, 2018 Why you were hospitalized Your primary diagnosis was:  S/P Cabg X 3 Your diagnoses also included:  Cad, Multiple Vessel, Cad (Coronary Artery Disease) Follow-up Information Follow up With Details Comments Contact Info Additional Information [facility] [address] [place] [address] 
[phone] [facility] CARDIAC WELLNESS Call in 1 week cardiac rehab program  [address], [address]. Please arrive 15 minutes prior to your appointment time and you will register in the [place] [address], [address], on the first floor of the [address] [place] [address] Road. Telephone: [phone] Fax: [phone] Driving directions To [facility]. Building: Driving WEST on [address], take exit [address] to [employer]. Turn left onto [facility], then turn right into [employer] Parking lot Driving EAST on [address], take exit [address] [facility]. Turn right at the end of the exit ramp. Turn left onto [facility], then turn right into [employer] lot. Geo Michael MD   [facility] 
[phone] Your Scheduled Appointments Monday July 23, 2018 11:00 AM EDT  
POST OP with King Wang MD  
[facility] ([facility]) [address] [place] [address] [address] [id number]  
[phone]  Monday August 13, 2018  1:15 PM EDT  
POST OP with King Wang MD  
 [facility] ([facility]) [address] [address] [place] [id number] [phone] Discharge Orders None A check ritu indicates which time of day the medication should be taken. My Medications START taking these medications Instructions Each Dose to Equal  
 Morning Noon Evening Bedtime  
 acetaminophen 325 mg tablet Commonly known as:  TYLENOL Your last dose was: Your next dose is: Take 2 Tabs by mouth every six (6) hours as needed for Pain. May purchase over the counter 650 mg  
    
   
   
   
  
 ascorbic acid (vitamin C) 500 mg tablet Commonly known as:  VITAMIN C Your last dose was: Your next dose is: Take 1 Tab by mouth daily. 500 mg  
    
   
   
   
  
 aspirin 81 mg chewable tablet Your last dose was: Your next dose is: Take 1 Tab by mouth daily. 81 mg  
    
   
   
   
  
 cyanocobalamin 500 mcg tablet Commonly known as:  VITAMIN B12 Your last dose was: Your next dose is: Take 1 Tab by mouth daily. 500 mcg  
    
   
   
   
  
 ferrous sulfate 324 mg (65 mg iron) tablet Your last dose was: Your next dose is: Take 1 Tab by mouth Daily (before breakfast). Indications: Iron Deficiency Anemia 324 mg  
    
   
   
   
  
 folic acid 1 mg tablet Commonly known as:  Google Your last dose was: Your next dose is: Take 1 Tab by mouth daily. 1 mg  
    
   
   
   
  
 furosemide 40 mg tablet Commonly known as:  LASIX Your last dose was: Your next dose is: Take 1 Tab by mouth daily for 5 days. 40 mg  
    
   
   
   
  
 insulin glargine 100 unit/mL injection Commonly known as:  LANTUS Your last dose was: Your next dose is:    
   
   
 25 Units by SubCUTAneous route daily. 25 Units oxyCODONE-acetaminophen 5-325 mg per tablet Commonly known as:  PERCOCET Your last dose was: Your next dose is: Take 1 Tab by mouth every four (4) hours as needed for Pain. Max Daily Amount: 6 Tabs. 1 Tab  
    
   
   
   
  
 senna-docusate 8.6-50 mg per tablet Commonly known as:  Anabel Kyaw Your last dose was: Your next dose is: Take 1 Tab by mouth daily. 1 Tab CHANGE how you take these medications Instructions Each Dose to Equal  
 Morning Noon Evening Bedtime  
 glipiZIDE 10 mg tablet Commonly known as:  Tino Lugo What changed:  See the new instructions. Your last dose was: Your next dose is: Take 0.5 Tabs by mouth two (2) times a day. Different dose than what you were taking 5 mg  
    
   
   
   
  
 metoprolol tartrate 100 mg IR tablet Commonly known as:  LOPRESSOR What changed:   
- medication strength 
- how much to take 
- additional instructions Your last dose was: Your next dose is: Take 1 Tab by mouth every twelve (12) hours for 60 days. Different dose than what you were taking 100 mg CONTINUE taking these medications Instructions Each Dose to Equal  
 Morning Noon Evening Bedtime  
 atorvastatin 20 mg tablet Commonly known as:  LIPITOR Your last dose was: Your next dose is: Take 1 Tab by mouth nightly. 20 mg  
    
   
   
   
  
 glucose blood VI test strips strip Commonly known as:  Carmencita Banerjee Your last dose was: Your next dose is:    
   
   
 Check sugar once daily  
     
   
   
   
  
 hydroCHLOROthiazide 25 mg tablet Commonly known as:  HYDRODIURIL Your last dose was: Your next dose is: Take 25 mg by mouth daily (after breakfast). 25 mg  
    
   
   
   
  
 JANUVIA 50 mg tablet Generic drug:  SITagliptin Your last dose was: Your next dose is: TAKE ONE TABLET BY MOUTH ONCE DAILY  
     
   
   
   
  
 lancets 33 gauge Misc Commonly known as: One Touch Georgann Mcardle Your last dose was: Your next dose is:    
   
   
 Check sugar once daily  
     
   
   
   
  
 losartan 50 mg tablet Commonly known as:  COZAAR Your last dose was: Your next dose is: Take 2 Tabs by mouth daily. 100 mg  
    
   
   
   
  
 pantoprazole 40 mg tablet Commonly known as:  PROTONIX Your last dose was: Your next dose is: Take 40 mg by mouth daily. 40 mg  
    
   
   
   
  
  
STOP taking these medications   
 amiodarone 400 mg tablet Commonly known as:  PACERONE  
   
  
 chlorhexidine 0.12 % solution Commonly known as:  PERIDEX  
   
  
 metFORMIN 1,000 mg tablet Commonly known as:  GLUCOPHAGE  
   
  
 mupirocin 2 % ointment Commonly known as:  BACTROBAN  
   
  
 nitroglycerin 0.4 mg SL tablet Commonly known as:  NITROSTAT Where to Get Your Medications [facility] [address], [address] [place] [address] Phone:  [phone]  
  ascorbic acid (vitamin C) 500 mg tablet  
 aspirin 81 mg chewable tablet  
 cyanocobalamin 500 mcg tablet  
 ferrous sulfate 324 mg (65 mg iron) tablet  
 folic acid 1 mg tablet  
 furosemide 40 mg tablet  
 glipiZIDE 10 mg tablet  
 insulin glargine 100 unit/mL injection  
 losartan 50 mg tablet  
 metoprolol tartrate 100 mg IR tablet  
 senna-docusate 8.6-50 mg per tablet Information on where to get these meds will be given to you by the nurse or doctor. ! Ask your nurse or doctor about these medications  
  acetaminophen 325 mg tablet  
 oxyCODONE-acetaminophen 5-325 mg per tablet Opioid Education Prescription Opioids: What You Need to Know: 
 
Prescription opioids can be used to help relieve moderate-to-severe pain and are often prescribed following a surgery or injury, or for certain health conditions. These medications can be an important part of treatment but also come with serious risks. Opioids are strong pain medicines. Examples include hydrocodone, oxycodone, fentanyl, and morphine. Heroin is an example of an illegal opioid. It is important to work with your health care provider to make sure you are getting the safest, most effective care. WHAT ARE THE RISKS AND SIDE EFFECTS OF OPIOID USE? Prescription opioids carry serious risks of addiction and overdose, especially with prolonged use. An opioid overdose, often marked by slow breathing, can cause sudden death. The use of prescription opioids can have a number of side effects as well, even when taken as directed. · Tolerance-meaning you might need to take more of a medication for the same pain relief · Physical dependence-meaning you have symptoms of withdrawal when the medication is stopped. Withdrawal symptoms can include nausea, sweating, chills, diarrhea, stomach cramps, and muscle aches. Withdrawal can last up to several weeks, depending on which drug you took and how long you took it. · Increased sensitivity to pain · Constipation · Nausea, vomiting, and dry mouth · Sleepiness and dizziness · Confusion · Depression · Low levels of testosterone that can result in lower sex drive, energy, and strength · Itching and sweating RISKS ARE GREATER WITH:      
· History of drug misuse, substance use disorder, or overdose · Mental health conditions (such as depression or anxiety) · Sleep apnea · Older age (72 years or older) · Pregnancy Avoid alcohol while taking prescription opioids. Also, unless specifically advised by your health care provider, medications to avoid include: · Benzodiazepines (such as Xanax or Valium) · Muscle relaxants (such as Soma or Flexeril) · Hypnotics (such as Ambien or Lunesta) · Other prescription opioids KNOW YOUR OPTIONS Talk to your health care provider about ways to manage your pain that don't involve prescription opioids. Some of these options may actually work better and have fewer risks and side effects. Options may include: 
· Pain relievers such as acetaminophen, ibuprofen, and naproxen · Some medications that are also used for depression or seizures · Physical therapy and exercise · Counseling to help patients learn how to cope better with triggers of pain and stress. · Application of heat or cold compress · Massage therapy · Relaxation techniques Be Informed Make sure you know the name of your medication, how much and how often to take it, and its potential risks & side effects. IF YOU ARE PRESCRIBED OPIOIDS FOR PAIN: 
· Never take opioids in greater amounts or more often than prescribed. Remember the goal is not to be pain-free but to manage your pain at a tolerable level. · Follow up with your primary care provider to: · Work together to create a plan on how to manage your pain. · Talk about ways to help manage your pain that don't involve prescription opioids. · Talk about any and all concerns and side effects. · Help prevent misuse and abuse. · Never sell or share prescription opioids · Help prevent misuse and abuse. · Store prescription opioids in a secure place and out of reach of others (this may include visitors, children, friends, and family). · Safely dispose of unused/unwanted prescription opioids: Find your community drug take-back program or your pharmacy mail-back program, or flush them down the toilet, following guidance from the Food and Drug Administration (www.fda.gov/Drugs/ResourcesForYou). · Visit www.cdc.gov/drugoverdose to learn about the risks of opioid abuse and overdose. · If you believe you may be struggling with addiction, tell your health care provider and ask for guidance or call [address] [place] [address] at [phone]. Discharge Instructions Cardiac Surgery Specialist 
 
[facility] [address] [id number] [facility]                 [address] [address] Office- [phone]  Fax- [phone]       Office- [phone]  Fax- [phone] 
_____________________________________________________________ Dr. Greer Larkin FNP      Pierre Cook ACNP-BC   Cassandra Latham, AGAP PETRA Perez PA-C, PA-C Name:Neal Kendall Surgery & Date: Procedure(s): CORONARY ARTERY BYPASS GRAFT x 3 with Left Internal Mammary Artery and Right Greater Saphenous Vein - Transesphageal EchoCardiogram and EpiAortic Ultrsound performed by Dr. Zak Alberts Discharge Date: 7/18/18 MEDICATIONS: 
Please refer to your After Visit Summary for your medication list. If you do not have a prescription for a new medication, you may purchase the medication over the counter. DO NOT TAKE ANY MEDICATIONS THAT ARE NOT ON THIS LIST INSTRUCTIONS: 
NO SMOKING OR TOBACCO PRODUCTS Follow all the instructions in your discharge book You may shower. Wash all incisions twice daily with mild soap and water. No lotions, ointments or powder. Call the office immediately for any redness, swelling, or drainage from your incision. Take your temperature daily and call for a temperature of 101 degrees or higher or for any symptoms that make you think you have and infection. Weigh yourself each morning. Call if you gain more than 5 pounds in 48 hours. Use the incentive spirometer 6-8 times a day-10 breaths each time.   Use a pillow or your bear to splint your breastbone when coughing or sneezing. If you feel your breast bone clicking or popping, notify the office immediately. Walk several hundred feet several times daily. DIET Eat an American Heart Association/American Diabetic Association diet. If you are having trouble with your appetite, eat what you can. Try eating small, frequent meals throughout the day. ACTIVITY 
NO DRIVINGyou will be evaluated to drive at your follow up visit. Increase your activity by walking several times a day. Stay out of bed most of the day. When sitting, keep your legs elevated. You may ride in a car, but you must get out every hour and walk around. If you ride in a car with an airbag that can not be switched off, put the seat ALL the way back or ride in the back seat. NO LIFTING MORE THAN 5 POUND FOR 1 MONTH, THEN YOU CAN INCREASE TO NO MORE THAN 10 LBS FOR THE 2nd MONTH AND NO MORE THAN 15 LBS FOR THE 3rd MONTH.  YOU WILL NO LONGER HAVE ANY LIFTING RESTRICTIONS AFTER 3 MONTHS. FOLLOW UP 
1. Your first follow up appointment will be on 7/23/18 at 11:00 am. Our office is located in [address] [place] [address] on floor G-5. Your second follow up appointment will be in four weeks, on 8/13/18 at 1:15 pm. Please call our office at [phone] if you are unable to make either one of these appointments. 2. You will be receiving a call before your 5 day appointment to begin cardiac rehab. They are located in the [address] [place] [address] on [facility]. Their phone number is [phone]. Please call if you have not been contacted 2-3 weeks after discharge from the hospital. 
3. We will make an appointment with your cardiologist at your last appointment. 4. Consult you primary care physician regarding your influenza &  
pneumovax vaccines. 5.   Please bring all medications with you to your appointment. Signature:___________________________________________________ [employer] Announcement We are excited to announce that we are making your provider's discharge notes available to you in [employer]. You will see these notes when they are completed and signed by the physician that discharged you from your recent hospital stay. If you have any questions or concerns about any information you see in [employer], please call the Health Information Department where you were seen or reach out to your Primary Care Provider for more information about your plan of care. Introducing [facility]! [facility] introduces [employer] patient portal. Now you can access parts of your medical record, email your doctor's office, and request medication refills online. 1. In your internet browser, go to [email or web address] [facility]/[employer] 2. Click on the First Time User? Click Here link in the Sign In box. You will see the New Member Sign Up page. 3. Enter your [employer] Access Code exactly as it appears below. You will not need to use this code after youve completed the sign-up process. If you do not sign up before the expiration date, you must request a new code. · [employer] Access Code: [email or web address] Expires: 9/23/2018 10:49 AM 
 
4. Enter the last four digits of your Social Security Number (xxxx) and Date of Birth (mm/dd/yyyy) as indicated and click Submit. You will be taken to the next sign-up page. 5. Create a [employer] ID. This will be your [employer] login ID and cannot be changed, so think of one that is secure and easy to remember. 6. Create a [employer] password. You can change your password at any time. 7. Enter your Password Reset Question and Answer. This can be used at a later time if you forget your password. 8. Enter your e-mail address. You will receive e-mail notification when new information is available in [address] 9. Click Sign Up. You can now view and download portions of your medical record. 10. Click the Download Summary menu link to download a portable copy of your medical information. If you have questions, please visit the Frequently Asked Questions section of the [employer] website. Remember, [employer] is NOT to be used for urgent needs. For medical emergencies, dial 911. Now available from your iPhone and Android! Introducing Kyle Shipley As a [employer] patient, I wanted to make you aware of our electronic visit tool called Kyle Shipley. [employer] 24/7 allows you to connect within minutes with a medical provider 24 hours a day, seven days a week via a mobile device or tablet or logging into a secure website from your computer. You can access Kyle Shipley from anywhere in the [place]. A virtual visit might be right for you when you have a simple condition and feel like you just dont want to get out of bed, or cant get away from work for an appointment, when your regular [facility] provider is not available (evenings, weekends or holidays), or when youre out of town and need minor care. Electronic visits cost only $49 and if the [employer] provider determines a prescription is needed to treat your condition, one can be electronically transmitted to a nearby pharmacy*. Please take a moment to enroll today if you have not already done so. The enrollment process is free and takes just a few minutes. To enroll, please download the Techpoint dominick to your tablet or phone, or visit [email or web address] to enroll on your computer. And, as an [address] [place] [address] patient with a [employer] account, the results of your visits will be scanned into your electronic medical record and your primary care provider will be able to view the scanned results. We urge you to continue to see your regular [facility] provider for your ongoing medical care.   And while your primary care provider may not be the one available when you seek a Kyle Robertouyen virtual visit, the peace of mind you get from getting a real diagnosis real time can be priceless. For more information on Kyle Mejiaclarafin, view our Frequently Asked Questions (FAQs) at [email or web address]. Sincerely, 
 
Duane Ramon MD 
Chief Medical Officer 50[address] Weinberg *:  certain medications cannot be prescribed via Kyle Mejiauyen Unresulted Labs-Please follow up with your PCP about these lab tests Order Current Status XR CHEST PORT In process Providers Seen During Your Hospitalization Provider Specialty Primary office phone Saul Kehr, MD Cardiothoracic Surgery [phone] Your Primary Care Physician (PCP) Primary Care Physician Office Phone Office Fax Edward Bonds [phone] You are allergic to the following No active allergies Recent Documentation Height Weight BMI Smoking Status 1.6 m 64.4 kg 25.15 kg/m2 Never Smoker Emergency Contacts Name Discharge Info Relation Home Work Mobile GhassanseanSusanna DISCHARGE CAREGIVER [3] Spouse [3] [phone] Patient Belongings The following personal items are in your possession at time of discharge: 
  Dental Appliances: None  Visual Aid: None   Hearing Aids/Status: At bedside         Clothing:  (belongings bag to family)    Other Valuables: Other (comment) (hearing aid to wife) Please provide this summary of care documentation to your next provider. Signatures-by signing, you are acknowledging that this After Visit Summary has been reviewed with you and you have received a copy. Patient Signature:  ____________________________________________________________ Date:  ____________________________________________________________  
  
Tarun Calero  Provider Signature: ____________________________________________________________ Date:  ____________________________________________________________

## 2018-07-09 NOTE — ANESTHESIA PREPROCEDURE EVALUATION
Anesthetic History   No history of anesthetic complications            Review of Systems / Medical History  Patient summary reviewed, nursing notes reviewed and pertinent labs reviewed    Pulmonary  Within defined limits                 Neuro/Psych              Cardiovascular    Hypertension          Past MI, CAD, cardiac stents and hyperlipidemia    Exercise tolerance: <4 METS     GI/Hepatic/Renal         Renal disease: CRI       Endo/Other    Diabetes: type 2         Other Findings              Physical Exam    Airway  Mallampati: II  TM Distance: 4 - 6 cm  Neck ROM: normal range of motion   Mouth opening: Normal     Cardiovascular  Regular rate and rhythm,  S1 and S2 normal,  no murmur, click, rub, or gallop             Dental    Dentition: Poor dentition and Lower partial plate     Pulmonary  Breath sounds clear to auscultation               Abdominal  GI exam deferred       Other Findings            Anesthetic Plan    ASA: 4  Anesthesia type: general    Monitoring Plan: Arterial line, BIS, CVP, [place] and KIARRA    Post procedure ventilation   Induction: Intravenous  Anesthetic plan and risks discussed with: Patient

## 2018-07-09 NOTE — PROGRESS NOTES
Problem: CABG: Day of Surgery (Initiate SCIP measures for post-op care)  Goal: *Hemodynamically stable (eg: Cardiac output/index; pulmonary arterial pressures; mixed venous oxygen saturation within set parameters)  Outcome: Progressing Towards Goal  Pt not on any vasoactive drips only amiodarone   Goal: *Lungs clear or at baseline  Outcome: Progressing Towards Goal  Encouraged to cough and deep breath  Goal: *Stable cardiac rhythm  Outcome: Resolved/Met Date Met: 07/09/18  NSR  Goal: *Optimal pain control at patient's stated goal  Outcome: Resolved/Met Date Met: 07/09/18  Pt is not in need of lots of pain medications and sleeping easily with morphine

## 2018-07-09 NOTE — PROGRESS NOTES
Problem: Diabetes Self-Management  Goal: *Monitoring blood glucose, interpreting and using results  Identify recommended blood glucose targets  and personal targets. Outcome: Not Progressing Towards Goal  Unable to educate today.

## 2018-07-09 NOTE — PROGRESS NOTES
Cardiac Surgery Care Coordinator- Met with the wife of Luna Conn, introduced role of the Cardiac Surgery Co- Nurse. Reviewed plan of care and day of surgery expectations. Provided her with a contact number and an update from OR. Encouraged her to verbalize and emotional support given. Will continue to update throughout the day. 1000- Met with the wife of Luna Conn, provided her with an update. She is  without questions or concerns at this time. Will continue to follow for educational and emotional needs. 1130- Met with Erlin Kendall's family and Dr. Ese Bassett. Update given, Encouraged family to verbalize and offered emotional support. Reinforced Surgical waiting room instructions, family to wait in the main surgical waiting room until contacted by the nursing staff. 200- Escorted family to the bedside in CVICU, reviewed plan of care and offered emotional support. Family will be going home for the afternoon. Will continue to follow and update.  Niecy Valadez RN

## 2018-07-10 ENCOUNTER — APPOINTMENT (OUTPATIENT)
Dept: GENERAL RADIOLOGY | Age: 68
DRG: 236 | End: 2018-07-10
Attending: NURSE PRACTITIONER
Payer: MEDICARE

## 2018-07-10 LAB
ADMINISTERED INITIALS, ADMINIT: NORMAL
ALBUMIN SERPL-MCNC: 3.4 G/DL (ref 3.5–5)
ALBUMIN/GLOB SERPL: 1.4 {RATIO} (ref 1.1–2.2)
ALP SERPL-CCNC: 40 U/L (ref 45–117)
ALT SERPL-CCNC: 17 U/L (ref 12–78)
ANION GAP SERPL CALC-SCNC: 9 MMOL/L (ref 5–15)
ARTERIAL PATENCY WRIST A: ABNORMAL
AST SERPL-CCNC: 49 U/L (ref 15–37)
BASE DEFICIT BLDV-SCNC: 2 MMOL/L
BDY SITE: ABNORMAL
BILIRUB SERPL-MCNC: 0.2 MG/DL (ref 0.2–1)
BUN SERPL-MCNC: 18 MG/DL (ref 6–20)
BUN/CREAT SERPL: 12 (ref 12–20)
CALCIUM SERPL-MCNC: 7.9 MG/DL (ref 8.5–10.1)
CHLORIDE SERPL-SCNC: 109 MMOL/L (ref 97–108)
CO2 SERPL-SCNC: 23 MMOL/L (ref 21–32)
CREAT SERPL-MCNC: 1.49 MG/DL (ref 0.7–1.3)
D50 ADMINISTERED, D50ADM: 0 ML
D50 ADMINISTERED, D50ADM: 9 ML
D50 ORDER, D50ORD: 0 ML
D50 ORDER, D50ORD: 9 ML
ERYTHROCYTE [DISTWIDTH] IN BLOOD BY AUTOMATED COUNT: 17 % (ref 11.5–14.5)
GAS FLOW.O2 O2 DELIVERY SYS: ABNORMAL L/MIN
GAS FLOW.O2 SETTING OXYMISER: 4 L/M
GLOBULIN SER CALC-MCNC: 2.4 G/DL (ref 2–4)
GLSCOM COMMENTS: NORMAL
GLUCOSE BLD STRIP.AUTO-MCNC: 110 MG/DL (ref 65–100)
GLUCOSE BLD STRIP.AUTO-MCNC: 110 MG/DL (ref 65–100)
GLUCOSE BLD STRIP.AUTO-MCNC: 112 MG/DL (ref 65–100)
GLUCOSE BLD STRIP.AUTO-MCNC: 116 MG/DL (ref 65–100)
GLUCOSE BLD STRIP.AUTO-MCNC: 128 MG/DL (ref 65–100)
GLUCOSE BLD STRIP.AUTO-MCNC: 135 MG/DL (ref 65–100)
GLUCOSE BLD STRIP.AUTO-MCNC: 143 MG/DL (ref 65–100)
GLUCOSE BLD STRIP.AUTO-MCNC: 145 MG/DL (ref 65–100)
GLUCOSE BLD STRIP.AUTO-MCNC: 147 MG/DL (ref 65–100)
GLUCOSE BLD STRIP.AUTO-MCNC: 153 MG/DL (ref 65–100)
GLUCOSE BLD STRIP.AUTO-MCNC: 157 MG/DL (ref 65–100)
GLUCOSE BLD STRIP.AUTO-MCNC: 218 MG/DL (ref 65–100)
GLUCOSE BLD STRIP.AUTO-MCNC: 230 MG/DL (ref 65–100)
GLUCOSE BLD STRIP.AUTO-MCNC: 238 MG/DL (ref 65–100)
GLUCOSE BLD STRIP.AUTO-MCNC: 78 MG/DL (ref 65–100)
GLUCOSE BLD STRIP.AUTO-MCNC: 79 MG/DL (ref 65–100)
GLUCOSE BLD STRIP.AUTO-MCNC: 81 MG/DL (ref 65–100)
GLUCOSE BLD STRIP.AUTO-MCNC: 92 MG/DL (ref 65–100)
GLUCOSE SERPL-MCNC: 109 MG/DL (ref 65–100)
GLUCOSE, GLC: 110 MG/DL
GLUCOSE, GLC: 110 MG/DL
GLUCOSE, GLC: 112 MG/DL
GLUCOSE, GLC: 116 MG/DL
GLUCOSE, GLC: 128 MG/DL
GLUCOSE, GLC: 135 MG/DL
GLUCOSE, GLC: 143 MG/DL
GLUCOSE, GLC: 145 MG/DL
GLUCOSE, GLC: 147 MG/DL
GLUCOSE, GLC: 153 MG/DL
GLUCOSE, GLC: 157 MG/DL
GLUCOSE, GLC: 218 MG/DL
GLUCOSE, GLC: 230 MG/DL
GLUCOSE, GLC: 238 MG/DL
GLUCOSE, GLC: 78 MG/DL
GLUCOSE, GLC: 81 MG/DL
GLUCOSE, GLC: 92 MG/DL
HCO3 BLDV-SCNC: 23.7 MMOL/L (ref 23–28)
HCT VFR BLD AUTO: 21.2 % (ref 36.6–50.3)
HGB BLD-MCNC: 6.4 G/DL (ref 12.1–17)
HIGH TARGET, HITG: 130 MG/DL
HIGH TARGET, HITG: 130 MG/DL
HIGH TARGET, HITG: 135 MG/DL
INSULIN ADMINSTERED, INSADM: 0 UNITS/HOUR
INSULIN ADMINSTERED, INSADM: 0 UNITS/HOUR
INSULIN ADMINSTERED, INSADM: 1.1 UNITS/HOUR
INSULIN ADMINSTERED, INSADM: 1.5 UNITS/HOUR
INSULIN ADMINSTERED, INSADM: 1.5 UNITS/HOUR
INSULIN ADMINSTERED, INSADM: 1.7 UNITS/HOUR
INSULIN ADMINSTERED, INSADM: 1.8 UNITS/HOUR
INSULIN ADMINSTERED, INSADM: 10.7 UNITS/HOUR
INSULIN ADMINSTERED, INSADM: 2 UNITS/HOUR
INSULIN ADMINSTERED, INSADM: 2.6 UNITS/HOUR
INSULIN ADMINSTERED, INSADM: 4.2 UNITS/HOUR
INSULIN ADMINSTERED, INSADM: 4.6 UNITS/HOUR
INSULIN ADMINSTERED, INSADM: 5.5 UNITS/HOUR
INSULIN ADMINSTERED, INSADM: 5.6 UNITS/HOUR
INSULIN ADMINSTERED, INSADM: 6.3 UNITS/HOUR
INSULIN ADMINSTERED, INSADM: 6.8 UNITS/HOUR
INSULIN ADMINSTERED, INSADM: 8.5 UNITS/HOUR
INSULIN ORDER, INSORD: 0 UNITS/HOUR
INSULIN ORDER, INSORD: 0.9 UNITS/HOUR
INSULIN ORDER, INSORD: 1.1 UNITS/HOUR
INSULIN ORDER, INSORD: 1.5 UNITS/HOUR
INSULIN ORDER, INSORD: 1.5 UNITS/HOUR
INSULIN ORDER, INSORD: 1.7 UNITS/HOUR
INSULIN ORDER, INSORD: 1.8 UNITS/HOUR
INSULIN ORDER, INSORD: 10.7 UNITS/HOUR
INSULIN ORDER, INSORD: 2 UNITS/HOUR
INSULIN ORDER, INSORD: 2.6 UNITS/HOUR
INSULIN ORDER, INSORD: 4.2 UNITS/HOUR
INSULIN ORDER, INSORD: 4.6 UNITS/HOUR
INSULIN ORDER, INSORD: 5.5 UNITS/HOUR
INSULIN ORDER, INSORD: 5.6 UNITS/HOUR
INSULIN ORDER, INSORD: 6.3 UNITS/HOUR
INSULIN ORDER, INSORD: 6.8 UNITS/HOUR
INSULIN ORDER, INSORD: 8.5 UNITS/HOUR
LOW TARGET, LOT: 95 MG/DL
MAGNESIUM SERPL-MCNC: 2 MG/DL (ref 1.6–2.4)
MCH RBC QN AUTO: 23.6 PG (ref 26–34)
MCHC RBC AUTO-ENTMCNC: 30.2 G/DL (ref 30–36.5)
MCV RBC AUTO: 78.2 FL (ref 80–99)
MINUTES UNTIL NEXT BG, NBG: 120 MIN
MINUTES UNTIL NEXT BG, NBG: 15 MIN
MINUTES UNTIL NEXT BG, NBG: 60 MIN
MULTIPLIER, MUL: 0.03
MULTIPLIER, MUL: 0.04
MULTIPLIER, MUL: 0.05
MULTIPLIER, MUL: 0.06
MULTIPLIER, MUL: 0.07
NRBC # BLD: 0 K/UL (ref 0–0.01)
NRBC BLD-RTO: 0 PER 100 WBC
ORDER INITIALS, ORDINIT: NORMAL
PCO2 BLDV: 40.9 MMHG (ref 41–51)
PH BLDV: 7.37 [PH] (ref 7.32–7.42)
PLATELET # BLD AUTO: 169 K/UL (ref 150–400)
PMV BLD AUTO: 11.6 FL (ref 8.9–12.9)
PO2 BLDV: 27 MMHG (ref 25–40)
POTASSIUM SERPL-SCNC: 4.4 MMOL/L (ref 3.5–5.1)
PROT SERPL-MCNC: 5.8 G/DL (ref 6.4–8.2)
RBC # BLD AUTO: 2.71 M/UL (ref 4.1–5.7)
SAO2 % BLDV: 47 % (ref 65–88)
SERVICE CMNT-IMP: ABNORMAL
SERVICE CMNT-IMP: NORMAL
SODIUM SERPL-SCNC: 141 MMOL/L (ref 136–145)
SPECIMEN TYPE: ABNORMAL
TOTAL RESP. RATE, ITRR: 20
WBC # BLD AUTO: 14.2 K/UL (ref 4.1–11.1)

## 2018-07-10 PROCEDURE — 74011250636 HC RX REV CODE- 250/636: Performed by: THORACIC SURGERY (CARDIOTHORACIC VASCULAR SURGERY)

## 2018-07-10 PROCEDURE — 71045 X-RAY EXAM CHEST 1 VIEW: CPT

## 2018-07-10 PROCEDURE — 51798 US URINE CAPACITY MEASURE: CPT

## 2018-07-10 PROCEDURE — 97535 SELF CARE MNGMENT TRAINING: CPT

## 2018-07-10 PROCEDURE — 74011636637 HC RX REV CODE- 636/637: Performed by: THORACIC SURGERY (CARDIOTHORACIC VASCULAR SURGERY)

## 2018-07-10 PROCEDURE — 74011250636 HC RX REV CODE- 250/636: Performed by: NURSE PRACTITIONER

## 2018-07-10 PROCEDURE — 36415 COLL VENOUS BLD VENIPUNCTURE: CPT | Performed by: NURSE PRACTITIONER

## 2018-07-10 PROCEDURE — 74011000250 HC RX REV CODE- 250: Performed by: THORACIC SURGERY (CARDIOTHORACIC VASCULAR SURGERY)

## 2018-07-10 PROCEDURE — 80053 COMPREHEN METABOLIC PANEL: CPT | Performed by: NURSE PRACTITIONER

## 2018-07-10 PROCEDURE — 74011250637 HC RX REV CODE- 250/637: Performed by: THORACIC SURGERY (CARDIOTHORACIC VASCULAR SURGERY)

## 2018-07-10 PROCEDURE — 74011000258 HC RX REV CODE- 258: Performed by: NURSE PRACTITIONER

## 2018-07-10 PROCEDURE — G8978 MOBILITY CURRENT STATUS: HCPCS

## 2018-07-10 PROCEDURE — 77010033678 HC OXYGEN DAILY

## 2018-07-10 PROCEDURE — 97165 OT EVAL LOW COMPLEX 30 MIN: CPT

## 2018-07-10 PROCEDURE — P9045 ALBUMIN (HUMAN), 5%, 250 ML: HCPCS | Performed by: NURSE PRACTITIONER

## 2018-07-10 PROCEDURE — 74011000258 HC RX REV CODE- 258: Performed by: THORACIC SURGERY (CARDIOTHORACIC VASCULAR SURGERY)

## 2018-07-10 PROCEDURE — 82962 GLUCOSE BLOOD TEST: CPT

## 2018-07-10 PROCEDURE — 97161 PT EVAL LOW COMPLEX 20 MIN: CPT

## 2018-07-10 PROCEDURE — 97530 THERAPEUTIC ACTIVITIES: CPT

## 2018-07-10 PROCEDURE — G8979 MOBILITY GOAL STATUS: HCPCS

## 2018-07-10 PROCEDURE — 94760 N-INVAS EAR/PLS OXIMETRY 1: CPT

## 2018-07-10 PROCEDURE — 65610000003 HC RM ICU SURGICAL

## 2018-07-10 PROCEDURE — 83735 ASSAY OF MAGNESIUM: CPT | Performed by: NURSE PRACTITIONER

## 2018-07-10 PROCEDURE — 74011250637 HC RX REV CODE- 250/637: Performed by: NURSE PRACTITIONER

## 2018-07-10 PROCEDURE — 74011250636 HC RX REV CODE- 250/636

## 2018-07-10 PROCEDURE — 85027 COMPLETE CBC AUTOMATED: CPT | Performed by: NURSE PRACTITIONER

## 2018-07-10 PROCEDURE — 82803 BLOOD GASES ANY COMBINATION: CPT

## 2018-07-10 RX ORDER — HYDRALAZINE HYDROCHLORIDE 20 MG/ML
20 INJECTION INTRAMUSCULAR; INTRAVENOUS
Status: DISCONTINUED | OUTPATIENT
Start: 2018-07-10 | End: 2018-07-11

## 2018-07-10 RX ORDER — METOPROLOL TARTRATE 25 MG/1
12.5 TABLET, FILM COATED ORAL EVERY 12 HOURS
Status: DISCONTINUED | OUTPATIENT
Start: 2018-07-10 | End: 2018-07-10

## 2018-07-10 RX ORDER — HYDRALAZINE HYDROCHLORIDE 20 MG/ML
10 INJECTION INTRAMUSCULAR; INTRAVENOUS
Status: DISCONTINUED | OUTPATIENT
Start: 2018-07-10 | End: 2018-07-11

## 2018-07-10 RX ORDER — LANOLIN ALCOHOL/MO/W.PET/CERES
500 CREAM (GRAM) TOPICAL DAILY
Status: DISCONTINUED | OUTPATIENT
Start: 2018-07-10 | End: 2018-07-18 | Stop reason: HOSPADM

## 2018-07-10 RX ORDER — ATORVASTATIN CALCIUM 20 MG/1
20 TABLET, FILM COATED ORAL
Status: DISCONTINUED | OUTPATIENT
Start: 2018-07-10 | End: 2018-07-18 | Stop reason: HOSPADM

## 2018-07-10 RX ORDER — ASCORBIC ACID 500 MG
500 TABLET ORAL DAILY
Status: DISCONTINUED | OUTPATIENT
Start: 2018-07-10 | End: 2018-07-18 | Stop reason: HOSPADM

## 2018-07-10 RX ORDER — FOLIC ACID 1 MG/1
1 TABLET ORAL DAILY
Status: DISCONTINUED | OUTPATIENT
Start: 2018-07-10 | End: 2018-07-18 | Stop reason: HOSPADM

## 2018-07-10 RX ORDER — METOPROLOL TARTRATE 50 MG/1
50 TABLET ORAL EVERY 12 HOURS
Status: DISCONTINUED | OUTPATIENT
Start: 2018-07-11 | End: 2018-07-18

## 2018-07-10 RX ORDER — METOPROLOL TARTRATE 25 MG/1
37.5 TABLET, FILM COATED ORAL ONCE
Status: COMPLETED | OUTPATIENT
Start: 2018-07-10 | End: 2018-07-10

## 2018-07-10 RX ORDER — METOPROLOL TARTRATE 25 MG/1
25 TABLET, FILM COATED ORAL EVERY 12 HOURS
Status: DISCONTINUED | OUTPATIENT
Start: 2018-07-10 | End: 2018-07-10

## 2018-07-10 RX ORDER — ALBUMIN HUMAN 50 G/1000ML
12.5 SOLUTION INTRAVENOUS ONCE
Status: COMPLETED | OUTPATIENT
Start: 2018-07-10 | End: 2018-07-10

## 2018-07-10 RX ADMIN — SODIUM CHLORIDE 10 ML/HR: 450 INJECTION, SOLUTION INTRAVENOUS at 13:16

## 2018-07-10 RX ADMIN — Medication 10 ML: at 19:04

## 2018-07-10 RX ADMIN — METOPROLOL TARTRATE 25 MG: 25 TABLET ORAL at 10:11

## 2018-07-10 RX ADMIN — CHLORHEXIDINE GLUCONATE 10 ML: 1.2 RINSE ORAL at 22:06

## 2018-07-10 RX ADMIN — SENNOSIDES AND DOCUSATE SODIUM 1 TABLET: 8.6; 5 TABLET ORAL at 09:34

## 2018-07-10 RX ADMIN — Medication 10 ML: at 06:32

## 2018-07-10 RX ADMIN — MUPIROCIN: 20 OINTMENT TOPICAL at 19:01

## 2018-07-10 RX ADMIN — SODIUM CHLORIDE 5 MG/HR: 900 INJECTION, SOLUTION INTRAVENOUS at 22:30

## 2018-07-10 RX ADMIN — ACETAMINOPHEN 1000 MG: 10 INJECTION, SOLUTION INTRAVENOUS at 06:18

## 2018-07-10 RX ADMIN — Medication 2 G: at 19:00

## 2018-07-10 RX ADMIN — OXYCODONE HYDROCHLORIDE AND ACETAMINOPHEN 500 MG: 500 TABLET ORAL at 10:11

## 2018-07-10 RX ADMIN — OXYCODONE HYDROCHLORIDE 10 MG: 5 TABLET ORAL at 09:47

## 2018-07-10 RX ADMIN — Medication 10 ML: at 06:31

## 2018-07-10 RX ADMIN — OXYCODONE HYDROCHLORIDE 10 MG: 5 TABLET ORAL at 16:36

## 2018-07-10 RX ADMIN — Medication 500 MCG: at 10:11

## 2018-07-10 RX ADMIN — Medication 10 ML: at 19:03

## 2018-07-10 RX ADMIN — ACETAMINOPHEN 1000 MG: 10 INJECTION, SOLUTION INTRAVENOUS at 12:15

## 2018-07-10 RX ADMIN — AMIODARONE HYDROCHLORIDE 1 MG/MIN: 50 INJECTION, SOLUTION INTRAVENOUS at 06:18

## 2018-07-10 RX ADMIN — METOPROLOL TARTRATE 12.5 MG: 25 TABLET ORAL at 20:52

## 2018-07-10 RX ADMIN — Medication 400 MG: at 19:01

## 2018-07-10 RX ADMIN — FOLIC ACID 1 MG: 1 TABLET ORAL at 10:11

## 2018-07-10 RX ADMIN — Medication 10 ML: at 15:00

## 2018-07-10 RX ADMIN — OXYCODONE HYDROCHLORIDE 10 MG: 5 TABLET ORAL at 21:02

## 2018-07-10 RX ADMIN — ATORVASTATIN CALCIUM 20 MG: 20 TABLET, FILM COATED ORAL at 22:00

## 2018-07-10 RX ADMIN — IRON SUCROSE 100 MG: 20 INJECTION, SOLUTION INTRAVENOUS at 11:23

## 2018-07-10 RX ADMIN — SODIUM CHLORIDE 1.5 UNITS/HR: 900 INJECTION, SOLUTION INTRAVENOUS at 07:54

## 2018-07-10 RX ADMIN — POLYETHYLENE GLYCOL 3350 17 G: 17 POWDER, FOR SOLUTION ORAL at 09:34

## 2018-07-10 RX ADMIN — FAMOTIDINE 20 MG: 20 TABLET ORAL at 09:34

## 2018-07-10 RX ADMIN — ASPIRIN 81 MG 81 MG: 81 TABLET ORAL at 09:34

## 2018-07-10 RX ADMIN — Medication 2 G: at 13:11

## 2018-07-10 RX ADMIN — SODIUM CHLORIDE 9 ML/HR: 900 INJECTION, SOLUTION INTRAVENOUS at 07:53

## 2018-07-10 RX ADMIN — CHLORHEXIDINE GLUCONATE 10 ML: 1.2 RINSE ORAL at 09:00

## 2018-07-10 RX ADMIN — SODIUM CHLORIDE 8.5 UNITS/HR: 900 INJECTION, SOLUTION INTRAVENOUS at 13:10

## 2018-07-10 RX ADMIN — HYDRALAZINE HYDROCHLORIDE 10 MG: 20 INJECTION INTRAMUSCULAR; INTRAVENOUS at 19:24

## 2018-07-10 RX ADMIN — Medication 2 G: at 06:00

## 2018-07-10 RX ADMIN — ALBUMIN (HUMAN) 12.5 G: 12.5 INJECTION, SOLUTION INTRAVENOUS at 15:36

## 2018-07-10 RX ADMIN — SODIUM CHLORIDE 10 ML/HR: 450 INJECTION, SOLUTION INTRAVENOUS at 07:54

## 2018-07-10 RX ADMIN — Medication 2 G: at 00:00

## 2018-07-10 RX ADMIN — AMIODARONE HYDROCHLORIDE 0.5 MG/MIN: 50 INJECTION, SOLUTION INTRAVENOUS at 07:53

## 2018-07-10 RX ADMIN — SENNOSIDES AND DOCUSATE SODIUM 1 TABLET: 8.6; 5 TABLET ORAL at 19:01

## 2018-07-10 RX ADMIN — MUPIROCIN: 20 OINTMENT TOPICAL at 09:35

## 2018-07-10 RX ADMIN — METOPROLOL TARTRATE 37.5 MG: 25 TABLET ORAL at 22:25

## 2018-07-10 NOTE — ANESTHESIA POSTPROCEDURE EVALUATION
Post-Anesthesia Evaluation and Assessment    Patient: Allen Timmons MRN: [id number]  SSN: [id number]    YOB: 1950  Age: 78 y.o. Sex: male       Cardiovascular Function/Vital Signs  Visit Vitals    BP (!) 86/46    Pulse 72    Temp 37.1 °C (98.8 °F)    Resp 26    Ht 5' 3\" (1.6 m)    Wt 64.8 kg (142 lb 13.7 oz)    SpO2 97%    BMI 25.31 kg/m2       Patient is status post general anesthesia for Procedure(s):  CORONARY ARTERY BYPASS GRAFT x 3 with Left Internal Mammary Artery and Right Greater Saphenous Vein - Transesphageal EchoCardiogram and EpiAortic Ultrsound performed by Dr. Rubia Berrios. Nausea/Vomiting: None    Postoperative hydration reviewed and adequate. Pain:  Pain Scale 1: Numeric (0 - 10) (07/10/18 0400)  Pain Intensity 1: 0 (07/10/18 0400)   Managed    Neurological Status:   Neuro (WDL): Within Defined Limits (07/09/18 0704)  Neuro  Neurologic State: Drowsy (07/09/18 2000)  Orientation Level: Oriented X4 (07/09/18 2000)  Cognition: Appropriate decision making; Appropriate for age attention/concentration; Appropriate safety awareness; Follows commands (07/09/18 2000)  Speech: Clear (07/09/18 2000)  Assessment L Pupil: Brisk;Round (07/09/18 2000)  Size L Pupil (mm): 3 (07/09/18 2000)  Assessment R Pupil: Brisk;Round (07/09/18 2000)  Size R Pupil (mm): 3 (07/09/18 2000)   At baseline    Mental Status and Level of Consciousness: Arousable    Pulmonary Status:   O2 Device: Nasal cannula (07/10/18 0400)   Adequate oxygenation and airway patent    Complications related to anesthesia: None    Post-anesthesia assessment completed.  No concerns    Signed By: Edda Vyas MD     July 10, 2018

## 2018-07-10 NOTE — PROGRESS NOTES
2000: Bedside shift change report given to Montse Thomas (oncoming nurse) by [facility] (offgoing nurse). Report included the following information SBAR, Kardex, ED Summary, Procedure Summary, Intake/Output, MAR, Recent Results and Cardiac Rhythm Paced. Pt BP low. Pacer on and nadege used occasionally, but CI and urine output still good. H&H drawn. HGB 6.2. [place] notified but no orders given. 2230: Pt wife on the phone for update. 0000: Pt has severe vagal response. 120/45 down to 78/30 when awake. 0600: pt bathed with chlorhexidine. 9302: After bath and turning pt to change sheets, a loud rhythmic sound was heard from a few feet away. It appears to be the pt's heart beat (friction rub) pt also showing JVD at this time.  and cardene started. Past 2 hours of CT drainage have increased from 20/hr to 80/hr. Pt resting comfortably with no other changes noted at this time. HR and rhythm regualar. Will continue to monitor. 0800: Bedside shift change report given to Lexie (oncoming nurse) by Montse Thomas (offgoing nurse). Report included the following information SBAR, Kardex, OR Summary, Procedure Summary, Intake/Output, MAR, Recent Results and Cardiac Rhythm Paced. Problem: CABG: Day of Surgery (Initiate SCIP measures for post-op care)  Goal: Medications  Outcome: Progressing Towards Goal  On and off nadege for low BP; Paced for low BP  Goal: Respiratory  Outcome: Progressing Towards Goal  Weaned to 4L  Goal: Psychosocial  Outcome: Progressing Towards Goal  drowsy  Problem: Pressure Injury - Risk of  Goal: *Prevention of pressure injury  Document Mike Scale and appropriate interventions in the flowsheet.   Outcome: Progressing Towards Goal  Pressure Injury Interventions:  Sensory Interventions: Check visual cues for pain  Activity Interventions: Pressure redistribution bed/mattress(bed type)  Mobility Interventions: Pressure redistribution bed/mattress (bed type)  Nutrition Interventions: Discuss nutritional consult with provider  Friction and Shear Interventions: HOB 30 degrees or less  Problem: Falls - Risk of  Goal: *Absence of Falls  Document Laverne Fall Risk and appropriate interventions in the flowsheet.   Outcome: Progressing Towards Goal  Fall Risk Interventions:

## 2018-07-10 NOTE — PROGRESS NOTES
Problem: Mobility Impaired (Adult and Pediatric)  Goal: *Acute Goals and Plan of Care (Insert Text)  Physical Therapy Goals    Initiated 07/10/18   1. Patient will move from supine to sit and sit to supine , scoot up and down and roll side to side in bed with modified independence within 5 day(s). 2.  Patient will transfer from bed to chair and chair to bed with modified independence using the least restrictive device within 5 day(s). 3.  Patient will perform sit to stand with modified independence within 5 day(s). 4.  Patient will ambulate with modified independence for 300 feet with the least restrictive device within 5 day(s). 5.  Patient will ascend/descend 12 stairs with one handrail(s) with modified independence within 5 day(s). 6.  Patient will perform cardiac exercises per protocol with independence within 5 days. 7.  Patient will verbally and functionally recall 3/3 sternal precautions within 5 days. physical Therapy EVALUATION  Patient: Diallo Arteaga (69 y.o. male)  Date: 7/10/2018  Primary Diagnosis: CAD   CAD (coronary artery disease)  Procedure(s) (LRB):  CORONARY ARTERY BYPASS GRAFT x 3 with Left Internal Mammary Artery and Right Greater Saphenous Vein - Transesphageal EchoCardiogram and EpiAortic Ultrsound performed by Dr. Ann Cee (N/A) 1 Day Post-Op   Precautions:   (P) Fall, Sternal    ASSESSMENT :  Based on the objective data described below, the patient presents with impaired functional independence compared to baseline secondary to CABG x 3 POD #1. Currently, patient's functional mobility is limited by the following: lines/IVs, newly enforced sternal precautions, drowsiness, chronic pain, typical post-op sternotomy pain, impaired cardiopulmonary tolerance, generally decreased functional strength, impaired endurance, and generally impaired dynamic standing balance.  PT reviewed and educated patient prior to physical assessment on sternal precautions, daily mobility expectations, and cardiac exercises; patient verbalizes good understanding. With OT present patient was brought to chair position, then scooted to EOB with Min A x 1. Once standing patient demos post weight shift and supinated B feet, however corrected once addressed by therapists. Second standing trial patient improved with all mechanics and was able to stand without anterior weight shifts. While bed was being changes patient stood for ~3-4 minutes with slight tremor noted during the last minute but no LOB, buckling, or other adverse signs/symptoms reported. Patient ended session with bed in full chair with all needs placed within reach and RN notified of patient's status. Discharge rec - HHPT    Patient will benefit from skilled intervention to address the above impairments. Patients rehabilitation potential is considered to be Good  Factors which may influence rehabilitation potential include:   [x]         None noted  []         Mental ability/status  []         Medical condition  []         Home/family situation and support systems  []         Safety awareness  []         Pain tolerance/management  []         Other:      PLAN :  Recommendations and Planned Interventions:  [x]           Bed Mobility Training             [x]    Neuromuscular Re-Education  [x]           Transfer Training                   []    Orthotic/Prosthetic Training  [x]           Gait Training                         []    Modalities  [x]           Therapeutic Exercises           []    Edema Management/Control  [x]           Therapeutic Activities            [x]    Patient and Family Training/Education  []           Other (comment):    Frequency/Duration: Patient will be followed by physical therapy  daily to address goals. Discharge Recommendations: Home Health  Further Equipment Recommendations for Discharge: TBD     SUBJECTIVE:   Patient stated yes. ...yes. ....yes.    OBJECTIVE DATA SUMMARY:   HISTORY:    Past Medical History:   Diagnosis Date    CAD (coronary artery disease) 11/10/2016    NSTEMI & 2 stents    Deafness 10/28/2012    DM [facility]di[facility] mellitus) ([facility] [address] [facility]     Elevated cholesterol     Hypertension     NSTEMI (non-ST elevated myocardial infarction) ([facility] [address] 11/10/2016     Past Surgical History:   Procedure Laterality Date    CARDIAC SURG PROCEDURE UNLIST  11/11/2016    2 stents    COLONOSCOPY N/A 6/28/2018    COLONOSCOPY performed by Yoli Coronado MD at [address] [place] [address] ENDOSCOPY    HX APPENDECTOMY       Prior Level of Function/Home Situation: independent  Personal factors and/or comorbidities impacting plan of care:     Home Situation  Home Environment: (P) Private residence  # Steps to Enter: (P) 5  Rails to Enter: (P) Yes  One/Two Story Residence: (P) Two story  # of Interior Steps: (P) 12  Lift Chair Available: (P) No  Living Alone: (P) No  Support Systems: (P) Spouse/Significant Other/Partner  Patient Expects to be Discharged to[de-identified] (P) Private residence  Current DME Used/Available at Home: (P) None  Tub or Shower Type: (P) Tub/Shower combination    EXAMINATION/PRESENTATION/DECISION MAKING:   Critical Behavior:  Neurologic State: (P) Alert  Orientation Level: (P) Oriented X4  Cognition: (P) Appropriate for age attention/concentration, Follows commands  Safety/Judgement: (P) Awareness of environment, Fall prevention  Hearing: Auditory  Auditory Impairment: (P) Hard of hearing, right side  Hearing Aids/Status:  At bedside  Skin:    Edema:   Range Of Motion:  AROM: Generally decreased, functional                       Strength:    Strength: Generally decreased, functional                    Tone & Sensation:   Tone: Normal              Sensation: Intact               Coordination:  Coordination: Within functional limits  Vision:   Tracking: Able to track stimulus in all quadrants w/o difficulty  Acuity: Within Defined Limits  Functional Mobility:  Bed Mobility:     Supine to Sit:  (bed mechanics)  Sit to Supine:  (bed mechanics)     Transfers:  Sit to Stand: Assist x2;Contact guard assistance;Minimum assistance (Min A initially to correct post weight shift)  Stand to Sit: Assist x2;Contact guard assistance                       Balance:   Sitting: Intact  Standing: Impaired  Ambulation/Gait Training:                                                         Stairs: Therapeutic Exercises:       Functional Measure:  Tinetti test:    Sitting Balance: 1  Arises: 0  Attempts to Rise: 0  Immediate Standing Balance: 1  Standing Balance: 1  Nudged: 0  Eyes Closed: 0  Turn 360 Degrees - Continuous/Discontinuous: 0  Turn 360 Degrees - Steady/Unsteady: 0  Sitting Down: 1  Balance Score: 4  Indication of Gait: 0  R Step Length/Height: 0  L Step Length/Height: 0  R Foot Clearance: 0  L Foot Clearance: 0  Step Symmetry: 0  Step Continuity: 0  Path: 0  Trunk: 0  Walking Time: 0  Gait Score: 0  Total Score: 4       Tinetti Test and G-code impairment scale:  Percentage of Impairment CH    0%   CI    1-19% CJ    20-39% CK    40-59% CL    60-79% CM    80-99% CN     100%   Tinetti  Score 0-28 28 23-27 17-22 12-16 6-11 1-5 0       Tinetti Tool Score Risk of Falls  <19 = High Fall Risk  19-24 = Moderate Fall Risk  25-28 = Low Fall Risk  Tinetti ME. Performance-Oriented Assessment of Mobility Problems in Elderly Patients. Day 66; [id number]. (Scoring Description: PT Bulletin Feb. 10, 1993)    Older adults: Jamison Rodriguez et al, 2009; n = 1000 [facility] elderly evaluated with ABC, CROW, ADL, and IADL)  · Mean CROW score for males aged 69-68 years = 26.21(3.40)  · Mean CROW score for females age 69-68 years = 25.16(4.30)  · Mean CROW score for males over 80 years = 23.29(6.02)  · Mean CROW score for females over 80 years = 17.20(8.32)        G codes: In compliance with CMSs Claims Based Outcome Reporting, the following G-code set was chosen for this patient based on their primary functional limitation being treated:     The outcome measure chosen to determine the severity of the functional limitation was the tinetti with a score of 4/28 which was correlated with the impairment scale. ? Mobility - Walking and Moving Around:     - CURRENT STATUS: CM - 80%-99% impaired, limited or restricted    - GOAL STATUS: CK - 40%-59% impaired, limited or restricted    - D/C STATUS:  ---------------To be determined---------------      Physical Therapy Evaluation Charge Determination   History Examination Presentation Decision-Making   MEDIUM  Complexity : 1-2 comorbidities / personal factors will impact the outcome/ POC  LOW Complexity : 1-2 Standardized tests and measures addressing body structure, function, activity limitation and / or participation in recreation  LOW Complexity : Stable, uncomplicated  Other outcome measures Tinetti  HIGH       Based on the above components, the patient evaluation is determined to be of the following complexity level: LOW     Pain:  Pain Scale 1: Numeric (0 - 10)  Pain Intensity 1: 0              Activity Tolerance:   Good, limited by lines  Please refer to the flowsheet for vital signs taken during this treatment. After treatment:   []         Patient left in no apparent distress sitting up in chair  [x]         Patient left in no apparent distress in bed  [x]         Call bell left within reach  [x]         Nursing notified  [x]         Caregiver present  []         Bed alarm activated    COMMUNICATION/EDUCATION:   The patients plan of care was discussed with: Registered Nurse. [x]         Fall prevention education was provided and the patient/caregiver indicated understanding. [x]         Patient/family have participated as able in goal setting and plan of care. [x]         Patient/family agree to work toward stated goals and plan of care. []         Patient understands intent and goals of therapy, but is neutral about his/her participation. []         Patient is unable to participate in goal setting and plan of care.     Thank you for this referral.  Seun Melgar PT, DPT   Time Calculation: 25 mins                    \

## 2018-07-10 NOTE — PROGRESS NOTES
Pt having lots of pain, medicated for pain. Pt in chair position at present. 1000:eaning Nicardipine off    1100 : Chair position for 3 hours    1335 Pt;s HR 63 CI 1.8 Pacing began AAI at 68.  1700 Pacer off Dr. Randall  here orders received to D/c lines no H & H to be done at this time. Bedside and Verbal shift change report given to [address]  (oncoming nurse) by Sean Gaines RN  (offgoing nurse). Report included the following information SBAR, OR Summary, Procedure Summary, Intake/Output and Cardiac Rhythm NSR.

## 2018-07-10 NOTE — DIABETES MGMT
[facility] Cardiac Surgery Progress Note     Recommendations/ Comments:  Pt arrived to CVICU at 1143 on 7-9-18. Consider continuing insulin gtt for at least 48hrs post-op and eating 50% solid foods then,  1) transition off gtt per [employer] Protocol   2) continue accu-checks and humalog correctional insulin ac & hs   3) ADA/AHA diet as diet advanced  4) resume glipizide once eating and off of drip    Insulin gtt should not be stopped until after 7-11-16 at 1143 to complete 48hr post-op time frame. Currently on insulin gtt. Current drip rate is 0.9 units per hour and blood sugar at 1623 was 81 mg/dl. Chart reviewed on [facility]. Patient is 78 y.o. male s/p CABG  POD 1. Pt with a known history of diabetes on Metformin 1000 mg BID, Glipizide 10 mg BID and Januvia 50 mg at home. A1c:   Lab Results   Component Value Date/Time    Hemoglobin A1c 7.4 (H) 06/22/2018 01:53 PM         Recent Glucose Results:   Lab Results   Component Value Date/Time     (H) 07/10/2018 04:14 AM    GLUCPOC 81 07/10/2018 04:22 PM    GLUCPOC 79 07/10/2018 04:05 PM    GLUCPOC 78 07/10/2018 04:04 PM        Lab Results   Component Value Date/Time    Creatinine 1.49 (H) 07/10/2018 04:14 AM     Estimated Creatinine Clearance: 39.2 mL/min (based on Cr of 1.49). Active Orders   Diet    DIET CLEAR LIQUID        PO intake: No data found. Will continue to follow as needed. Thank you.   Ruddy Pope RD

## 2018-07-10 NOTE — PROGRESS NOTES
Problem: Self Care Deficits Care Plan (Adult)  Goal: *Acute Goals and Plan of Care (Insert Text)  Occupational Therapy Goals  Initiated 7/10/2018  1. Patient will perform ADLs standing 5 mins without fatigue or LOB with supervision/set-up within 7 day(s). 2.  Patient will perform lower body ADLs with minimal assistance/contact guard assist and AE PRN within 7 day(s). 3.  Patient will perform upper body dressing with modified independence within 7 day(s). 4.  Patient will perform toilet transfers with supervision/set-up within 7 day(s). 5.  Patient will perform all aspects of toileting with supervision within 7 day(s). 6.  Patient will participate in cardiac/sternal upper extremity therapeutic exercise/activities to increase independence with ADLs with supervision/set-up for 5 minutes within 7 day(s). Occupational Therapy EVALUATION  Patient: Cristine Monk (69 y.o. male)  Date: 7/10/2018  Primary Diagnosis: CAD   CAD (coronary artery disease)  Procedure(s) (LRB):  CORONARY ARTERY BYPASS GRAFT x 3 with Left Internal Mammary Artery and Right Greater Saphenous Vein - Transesphageal EchoCardiogram and EpiAortic Ultrsound performed by Dr. Janna Bhagat (N/A) 1 Day Post-Op   Precautions: Fall, Sternal    ASSESSMENT :  Based on the objective data described below, the patient presents with overall up to [address] A for upper body ADLs, Max A for lower body ADLs, and Min-CGA A x2 for functional mobility s/p CABG x3 POD #1. PTA, patient independent with ADLs and mobility, working full time, baseline [place] in [place]. Now presents with sternal precautions, decreased activity tolerance, impaired functional mobility, decreased functional reach, global deconditioning, and post-op discomfort & anxiety with activity. Patient received in modified bed/chair position, seen with PT to maximize safety and functional mobility. No recall of sternal precautions, educated on all 3.  With verbal/visual cues, able to scoot with up to Mod A, standing with initial Min A x2 for posterior weight shift, however able to correct for second stand with CGA x2. Patient standing x4 min, noted tremor in RUE, wife reporting patient with increasing anxiety with movement, verbal/tactie cues provided for relaxation. Patient unable to tailor sit despite assist, however wife reports she will take off work to initially help with transition home. Encouraged continued mobility/tolerance of chair position, educated on acute therapy progression. Anticipate patient can safety d/c home when medically stable, recommend HHOT vs. None pending progress. Patient will benefit from skilled intervention to address the above impairments. Patients rehabilitation potential is considered to be Good  Factors which may influence rehabilitation potential include:   []             None noted  []             Mental ability/status  []             Medical condition  []             Home/family situation and support systems  []             Safety awareness  []             Pain tolerance/management  []             Other:      PLAN :  Recommendations and Planned Interventions:  [x]               Self Care Training                  []        Therapeutic Activities  [x]               Functional Mobility Training    []        Cognitive Retraining  [x]               Therapeutic Exercises           []        Endurance Activities  [x]               Balance Training                   []        Neuromuscular Re-Education  []               Visual/Perceptual Training     []   Home Safety Training  [x]               Patient Education                 []        Family Training/Education  []               Other (comment):    Frequency/Duration: Patient will be followed by occupational therapy 5 times a week to address goals. Discharge Recommendations: Home Health vs. None  Further Equipment Recommendations for Discharge: TBD     SUBJECTIVE:   Patient stated Yes. .yes. .. (brief responses with questions)    The patient stated 0/3 sternal precautions. Reviewed all 3 with patient. OBJECTIVE DATA SUMMARY:   HISTORY:   Past Medical History:   Diagnosis Date    CAD (coronary artery disease) 11/10/2016    NSTEMI & 2 stents    Deafness 10/28/2012    DM [facility]diabetes mellitus) [facility] [address]     Elevated cholesterol     Hypertension     NSTEMI (non-ST elevated myocardial infarction) ([facility] [address] 11/10/2016     Past Surgical History:   Procedure Laterality Date    CARDIAC SURG PROCEDURE UNLIST  11/11/2016    2 stents    COLONOSCOPY N/A 6/28/2018    COLONOSCOPY performed by Tyson Keenan MD at [facility] ENDOSCOPY    HX APPENDECTOMY         Prior Level of Function/Environment/Context: PTA, patient independent with ADLs and mobility, working full time in a cafe, lives with wife in a 2 story house. No DME    Home Situation  Home Environment: Private residence  # Steps to Enter: 5  Rails to Enter: Yes  One/Two Story Residence: Two story  # of Interior Steps: 12  Lift Chair Available: No  Living Alone: No  Support Systems: Spouse/Significant Other/Partner  Patient Expects to be Discharged to[de-identified] Private residence  Current DME Used/Available at Home: None  Tub or Shower Type: Tub/Shower combination    Hand dominance: Right    EXAMINATION OF PERFORMANCE DEFICITS:  Cognitive/Behavioral Status:  Neurologic State: Alert  Orientation Level: Oriented X4  Cognition: Appropriate for age attention/concentration; Follows commands  Perception: Appears intact  Perseveration: No perseveration noted  Safety/Judgement: Awareness of environment; Fall prevention    Skin: Appears intact, noted CT, PIVs, Zebulon-Marce, & R art-line    Edema: None noted in BUEs    Hearing: Auditory  Auditory Impairment: Hard of hearing, right side  Hearing Aids/Status:  At bedside    Vision/Perceptual:    Tracking: Able to track stimulus in all quadrants w/o difficulty    Acuity: Within Defined Limits         Range of Motion:  In BUEs  AROM: Generally decreased, functional   *RUE tremor with increased anxiety with mobility    Strength: In BUEs  Strength: Generally decreased, functional        Coordination:  Coordination: Within functional limits  Fine Motor Skills-Upper: Left Intact; Right Intact    Gross Motor Skills-Upper: Left Intact; Right Intact    Tone & Sensation:  In BUEs  Tone: Normal  Sensation: Intact     Balance:  Sitting: Intact  Standing: Impaired  Standing - Static: Fair;Good (Initially fair, posterior wt shift, able to correct with cue)  Standing - Dynamic : Fair    Functional Mobility and Transfers for ADLs:  Bed Mobility:  Supine to Sit:  (Receieved sitting up in bed/chair Simultaneous filing. User may not have seen previous data.)  Sit to Supine:  (bed mechanics)  Scooting: Moderate assistance    Transfers:  Sit to Stand: Assist x2;Contact guard assistance;Minimum assistance (Initial Min A for posterior weight shift)  Stand to Sit: Minimum assistance;Assist x2 (Simultaneous filing. User may not have seen previous data.)  Toilet Transfer : Minimum assistance;Assist x2 (Inferred per functional mobility & line mgmt)    ADL Assessment:  Feeding: Independent*    Oral Facial Hygiene/Grooming: Supervision (Seated)*    Bathing: Moderate assistance*    Upper Body Dressing: Moderate assistance*    Lower Body Dressing: Maximum assistance    Toileting: Maximum assistance*   *Inferred per functional mobility, line management, functional reach, BUE ROM, sternal precautions, anxiety with movement, and activity tolerance             ADL Intervention and task modifications:     Lower Body Dressing Assistance  Dressing Assistance: Total assistance(dependent)  Socks: Total assistance (dependent)  Leg Crossed Method Used: No (Attempted, unable to complete)  Position Performed:  (bed/chair)  Cues: Verbal cues provided;Visual cues provided;Physical assistance    Cognitive Retraining  Safety/Judgement: Awareness of environment; Fall prevention    Patient instructed no asymmetrical reaching over head to ensure B UEs when shoulders >90* i.e. reaching in cabinets and dressing. Instruction on upper body dressing techniques of over head, then arms through to decrease pain and unilateral shoulder flexion >90*. Instruction on the benefits of utilizing B UEs during functional tasks i.e. opening the fridge, stepping into the tub. Instruction if continued pain at home with shoulder IR for BM hygiene can use wet wipes and toilet tongs PRN. Avoid valsalva maneuvers. May have to adjust home setup to increase ease with items closer to waist height to prevent deep bending and the automatic  of asymmetrical UE WB/pushing for stabilization during bending. Benefit to don clothing tailor sitting and don all clothing while sitting prior to standing. Patient demonstrated lower body dressing seated in bed/chair with Total assistance. Instruction and indicated understanding on the benefits of loose clothing throughout to accommodate for post surgical swelling, decreased ROM and increased pain. Instruction and indicated understanding the technique of pull over shirt versus front open clothing. Increase activity tolerance for home, work, and sexual intercourse by pacing self with increasing the arm exercises, sitting duration, frequency OOB, walking, standing, and ADLs. Instructed and indicated understanding of s/s of too much activity, how to respond to s/s safely.     Therapeutic Exercises:   - Sit <>stand x2 with initial Min A x2 for posterior weight shift, able to self-correct with second trial CGA x2  - Standing x4 min with CGA, RUE tremor with continued standing, cues for PLB/relaxation    Functional Measure:  Barthel Index:    Bathin  Bladder: 10  Bowels: 10  Groomin  Dressin  Feeding: 10  Mobility: 5  Stairs: 0  Toilet Use: 5  Transfer (Bed to Chair and Back): 5  Total: 55       Barthel and G-code impairment scale:  Percentage of impairment CH  0% CI  1-19% CJ  20-39% CK  40-59% CL  60-79% CM  80-99% CN  100% Barthel Score 0-100 100 99-80 79-60 59-40 20-39 1-19   0   Barthel Score 0-20 20 17-19 13-16 9-12 5-8 1-4 0      The Barthel ADL Index: Guidelines  1. The index should be used as a record of what a patient does, not as a record of what a patient could do. 2. The main aim is to establish degree of independence from any help, physical or verbal, however minor and for whatever reason. 3. The need for supervision renders the patient not independent. 4. A patient's performance should be established using the best available evidence. Asking the patient, friends/relatives and nurses are the usual sources, but direct observation and common sense are also important. However direct testing is not needed. 5. Usually the patient's performance over the preceding 24-48 hours is important, but occasionally longer periods will be relevant. 6. Middle categories imply that the patient supplies over 50 per cent of the effort. 7. Use of aids to be independent is allowed. Mercedes Swanson., Barthel, D.W. ([id number]). Functional evaluation: the Barthel Index. [address] [facility] (14)2. Natali Burrows, LUIS MIGUEL.F, [employer] [place]., [place], [facility] (1999). Measuring the change indisability after inpatient rehabilitation; comparison of the responsiveness of the Barthel Index and Functional [place] Measure. Journal of Neurology, Neurosurgery, and Psychiatry, 66(4), 334-858. Tamir Arredondo, N.J.A, Aydee Murray,  W.J.M, & Arleth Mac, M.A. (2004.) Assessment of post-stroke quality of life in cost-effectiveness studies: The usefulness of the Barthel Index and the EuroQoL-5D. Quality of Life Research, 13, 238-99         G codes: In compliance with CMSs Claims Based Outcome Reporting, the following G-code set was chosen for this patient based on their primary functional limitation being treated:     The outcome measure chosen to determine the severity of the functional limitation was the Barthel Index with a score of 55/100 which was correlated with the impairment scale. ? Self Care:     - CURRENT STATUS: CK - 40%-59% impaired, limited or restricted    - GOAL STATUS: CJ - 20%-39% impaired, limited or restricted    - D/C STATUS:  ---------------To be determined---------------     Occupational Therapy Evaluation Charge Determination   History Examination Decision-Making   LOW Complexity : Brief history review  LOW Complexity : 1-3 performance deficits relating to physical, cognitive , or psychosocial skils that result in activity limitations and / or participation restrictions  LOW Complexity : No comorbidities that affect functional and no verbal or physical assistance needed to complete eval tasks       Based on the above components, the patient evaluation is determined to be of the following complexity level: LOW   Pain:  Pain Scale 1: Numeric (0 - 10)  Pain Intensity 1: 0      Activity Tolerance:   Good-Fair, limited by anxiety & lines    Please refer to the flowsheet for vital signs taken during this treatment. After treatment:   [x] Patient left in no apparent distress sitting up bed/chair  [] Patient left in no apparent distress in bed  [x] Call bell left within reach  [x] Nursing notified  [x] Caregiver present  [] Bed alarm activated    COMMUNICATION/EDUCATION:   The patients plan of care was discussed with: Physical Therapist and Registered Nurse. [x] Home safety education was provided and the patient/caregiver indicated understanding. [x] Patient/family have participated as able in goal setting and plan of care. [x] Patient/family agree to work toward stated goals and plan of care. [] Patient understands intent and goals of therapy, but is neutral about his/her participation. [] Patient is unable to participate in goal setting and plan of care. This patients plan of care is appropriate for delegation to [facility].     Thank you for this referral.  Carlos Braden OT  Time Calculation: 30 mins

## 2018-07-10 NOTE — PROGRESS NOTES
[facility] ICU Progress Note    Admit Date: 2018  POD:  1 Day Post-Op    Procedure:  Procedure(s):  CORONARY ARTERY BYPASS GRAFT x 3 with Left Internal Mammary Artery and Right Greater Saphenous Vein - Transesphageal EchoCardiogram and EpiAortic Ultrsound performed by Dr. Surjit Cervantes        Subjective:   Pt seen with Dr. Cyndie Moore. On amio 1, prec 0.2, insulin, cardene 10. Tmax 99.1, on 4 L NC. Objective:   Vitals:  Blood pressure (!) 86/46, pulse 72, temperature 98.8 °F (37.1 °C), resp. rate 26, height 5' 3\" (1.6 m), weight 142 lb 13.7 oz (64.8 kg), SpO2 97 %. Temp (24hrs), Av.6 °F (37 °C), Min:97.3 °F (36.3 °C), Max:99.1 °F (37.3 °C)    Hemodynamics:   CO: CO (l/min): 4.4 l/min   CI: CI (l/min/m2): 2.6 l/min/m2   CVP: CVP (mmHg): 6 mmHg (07/10/18 0800)   SVR: SVR (dyne*sec)/cm5: 1263 (dyne*sec)/cm5 (07/10/18 9738)   PAP Systolic: PAP Systolic: 25 (21/99/79 2306)   PAP Diastolic: PAP Diastolic: 11 (82/33/81 5363)   PVR:     SV02: SVO2 (%): 47 % (07/10/18 0800)   SCV02:      EKG/Rhythm:  SR     Extubation Date / Time: 1338     CT Output: 550 ml     Ventilator:  Ventilator Volumes  Vt Set (ml): 500 ml (18 121)  Vt Exhaled (Machine Breath) (ml): 813 ml (18 121)  Ve Observed (l/min): 12 l/min (18)    Oxygen Therapy:  Oxygen Therapy  O2 Sat (%): 97 % (07/10/18 08)  Pulse via Oximetry: 72 beats per minute (07/10/18 08)  O2 Device: Nasal cannula (07/10/18 0400)  O2 Flow Rate (L/min): 4 l/min (07/10/18 0400)  FIO2 (%): 40 % (18 1214)    CXR:   CXR Results  (Last 48 hours)               18 1157  XR CHEST PORT Final result    Impression:  IMPRESSION: Satisfactory postoperative appearance. Narrative:  EXAM: Portable CXR.  1156 hours. INDICATION: postop heart CABG x3 for coronary artery disease 2018. ET tube is satisfactory. NG tube in the stomach. Williams tip is in the main PA. Chest drains are present. There is no pneumothorax or pulmonary edema. Cardiomediastinum shows no   enlargement. There is mild atelectasis. Admission Weight: Last Weight   Weight: 143 lb (64.9 kg) Weight: 142 lb 13.7 oz (64.8 kg)     Intake / Output / Drain:  Current Shift:    Last 24 hrs.:     Intake/Output Summary (Last 24 hours) at 07/10/18 0930  Last data filed at 07/10/18 0700   Gross per 24 hour   Intake          4153.94 ml   Output             2380 ml   Net          1773.94 ml       EXAM:  General:  No obvious distress                                                                                        Lungs:   Clear to auscultation bilaterally, diminished in bases. Incision:  Drs C/D/I   Heart:  Regular rate and rhythm, S1, S2 normal, no murmur, click, rub or gallop. Abdomen:   Soft, non-tender. Bowel sounds hypoactive. No masses,  No organomegaly. Extremities:  No edema. PPP. Neurologic:  Gross motor and sensory apparatus intact. Labs:   Recent Labs      07/10/18   0819   07/10/18   0414   18   1159   WBC   --    --   14.2*   --   19.3*   HGB   --    --   6.4*   < >  7.4*   HCT   --    --   21.2*   < >  24.4*   PLT   --    --   169   --   214   NA   --    --   141   < >  143   K   --    --   4.4   < >  4.5   BUN   --    --   18   < >  13   CREA   --    --   1.49*   < >  1.40*   GLU   --    --   109*   < >  97   GLUCPOC  147*   < >   --    < >   --    INR   --    --    --    --   1.3*    < > = values in this interval not displayed. Assessment:     Principal Problem:    S/P CABG x 3 (2018)      Overview: Coronary Artery Bypass Grafting x 3, LIMA to LAD, RSVG to OM, RSVG to RCA      Right GSV EVH    Active Problems:    CAD, multiple vessel (2018)      CAD (coronary artery disease) (2018)         Plan/Recommendations/Medical Decision Makin. S/p CABG x 3:  On asa, resume statin and BB. 2. HTN:  Wean cardene for SBP < 140, start PO BB. Monitor   3. Atelectasis: wean oxygen for 02 sat > 92%.  Increase IS and activity as tolerated. 4. DM: Hgba1c 7.4. Cont insulin gtt per protocol. DTC consult. Will need to resume home meds as appropriate. 5. Acute anemia s/t post op blood loss & iron deficiency/B12 def: Recently had EGD/colonscopy after discovery of anemia during hospitalization. Showed gastritis. Cont pepcid. Start IV iron, PO N32, folic acid and Vit C(caution d/t renal insuff). Monitor H/H and CT output. Hold on pRBC transfusion for now per [place]. 6. Hyperlipidemia: resume statin. 7. Renal insufficiency:  Creat around baseline 1.4. Monitor. Avoid nephrotoxic meds. Known recently to have hyperkalemia on outpt labs. K stable today. 8.  Dispo: PT/OT. Magalys. D/c andrzej. Transfer to [facility].       Signed By: Anna Marie Hendricks NP

## 2018-07-11 ENCOUNTER — HOME HEALTH ADMISSION (OUTPATIENT)
Dept: HOME HEALTH SERVICES | Facility: HOME HEALTH | Age: 68
End: 2018-07-11
Payer: MEDICARE

## 2018-07-11 ENCOUNTER — APPOINTMENT (OUTPATIENT)
Dept: GENERAL RADIOLOGY | Age: 68
DRG: 236 | End: 2018-07-11
Attending: NURSE PRACTITIONER
Payer: MEDICARE

## 2018-07-11 LAB
ADMINISTERED INITIALS, ADMINIT: NORMAL
ALBUMIN SERPL-MCNC: 3.3 G/DL (ref 3.5–5)
ALBUMIN/GLOB SERPL: 1 {RATIO} (ref 1.1–2.2)
ALP SERPL-CCNC: 52 U/L (ref 45–117)
ALT SERPL-CCNC: 23 U/L (ref 12–78)
ANION GAP SERPL CALC-SCNC: 10 MMOL/L (ref 5–15)
AST SERPL-CCNC: 45 U/L (ref 15–37)
BILIRUB SERPL-MCNC: 0.3 MG/DL (ref 0.2–1)
BUN SERPL-MCNC: 24 MG/DL (ref 6–20)
BUN/CREAT SERPL: 14 (ref 12–20)
CALCIUM SERPL-MCNC: 8.4 MG/DL (ref 8.5–10.1)
CHLORIDE SERPL-SCNC: 105 MMOL/L (ref 97–108)
CO2 SERPL-SCNC: 21 MMOL/L (ref 21–32)
CREAT SERPL-MCNC: 1.76 MG/DL (ref 0.7–1.3)
D50 ADMINISTERED, D50ADM: 0 ML
D50 ADMINISTERED, D50ADM: 10 ML
D50 ADMINISTERED, D50ADM: 15 ML
D50 ORDER, D50ORD: 0 ML
D50 ORDER, D50ORD: 10 ML
D50 ORDER, D50ORD: 15 ML
D50 ORDER, D50ORD: 9 ML
ERYTHROCYTE [DISTWIDTH] IN BLOOD BY AUTOMATED COUNT: 17.6 % (ref 11.5–14.5)
GLOBULIN SER CALC-MCNC: 3.2 G/DL (ref 2–4)
GLSCOM COMMENTS: NORMAL
GLUCOSE BLD STRIP.AUTO-MCNC: 104 MG/DL (ref 65–100)
GLUCOSE BLD STRIP.AUTO-MCNC: 109 MG/DL (ref 65–100)
GLUCOSE BLD STRIP.AUTO-MCNC: 113 MG/DL (ref 65–100)
GLUCOSE BLD STRIP.AUTO-MCNC: 119 MG/DL (ref 65–100)
GLUCOSE BLD STRIP.AUTO-MCNC: 121 MG/DL (ref 65–100)
GLUCOSE BLD STRIP.AUTO-MCNC: 129 MG/DL (ref 65–100)
GLUCOSE BLD STRIP.AUTO-MCNC: 132 MG/DL (ref 65–100)
GLUCOSE BLD STRIP.AUTO-MCNC: 134 MG/DL (ref 65–100)
GLUCOSE BLD STRIP.AUTO-MCNC: 135 MG/DL (ref 65–100)
GLUCOSE BLD STRIP.AUTO-MCNC: 136 MG/DL (ref 65–100)
GLUCOSE BLD STRIP.AUTO-MCNC: 142 MG/DL (ref 65–100)
GLUCOSE BLD STRIP.AUTO-MCNC: 151 MG/DL (ref 65–100)
GLUCOSE BLD STRIP.AUTO-MCNC: 154 MG/DL (ref 65–100)
GLUCOSE BLD STRIP.AUTO-MCNC: 155 MG/DL (ref 65–100)
GLUCOSE BLD STRIP.AUTO-MCNC: 167 MG/DL (ref 65–100)
GLUCOSE BLD STRIP.AUTO-MCNC: 167 MG/DL (ref 65–100)
GLUCOSE BLD STRIP.AUTO-MCNC: 62 MG/DL (ref 65–100)
GLUCOSE BLD STRIP.AUTO-MCNC: 76 MG/DL (ref 65–100)
GLUCOSE BLD STRIP.AUTO-MCNC: 78 MG/DL (ref 65–100)
GLUCOSE BLD STRIP.AUTO-MCNC: 82 MG/DL (ref 65–100)
GLUCOSE BLD STRIP.AUTO-MCNC: 87 MG/DL (ref 65–100)
GLUCOSE BLD STRIP.AUTO-MCNC: 88 MG/DL (ref 65–100)
GLUCOSE BLD STRIP.AUTO-MCNC: 94 MG/DL (ref 65–100)
GLUCOSE BLD STRIP.AUTO-MCNC: 95 MG/DL (ref 65–100)
GLUCOSE SERPL-MCNC: 121 MG/DL (ref 65–100)
GLUCOSE, GLC: 104 MG/DL
GLUCOSE, GLC: 109 MG/DL
GLUCOSE, GLC: 113 MG/DL
GLUCOSE, GLC: 119 MG/DL
GLUCOSE, GLC: 121 MG/DL
GLUCOSE, GLC: 129 MG/DL
GLUCOSE, GLC: 132 MG/DL
GLUCOSE, GLC: 134 MG/DL
GLUCOSE, GLC: 135 MG/DL
GLUCOSE, GLC: 136 MG/DL
GLUCOSE, GLC: 142 MG/DL
GLUCOSE, GLC: 151 MG/DL
GLUCOSE, GLC: 154 MG/DL
GLUCOSE, GLC: 155 MG/DL
GLUCOSE, GLC: 167 MG/DL
GLUCOSE, GLC: 167 MG/DL
GLUCOSE, GLC: 62 MG/DL
GLUCOSE, GLC: 76 MG/DL
GLUCOSE, GLC: 78 MG/DL
GLUCOSE, GLC: 82 MG/DL
GLUCOSE, GLC: 87 MG/DL
GLUCOSE, GLC: 88 MG/DL
GLUCOSE, GLC: 94 MG/DL
GLUCOSE, GLC: 95 MG/DL
HCT VFR BLD AUTO: 22.5 % (ref 36.6–50.3)
HGB BLD-MCNC: 7 G/DL (ref 12.1–17)
HIGH TARGET, HITG: 130 MG/DL
HIGH TARGET, HITG: 140 MG/DL
INSULIN ADMINSTERED, INSADM: 0 UNITS/HOUR
INSULIN ADMINSTERED, INSADM: 1.2 UNITS/HOUR
INSULIN ADMINSTERED, INSADM: 1.2 UNITS/HOUR
INSULIN ADMINSTERED, INSADM: 1.3 UNITS/HOUR
INSULIN ADMINSTERED, INSADM: 1.3 UNITS/HOUR
INSULIN ADMINSTERED, INSADM: 1.8 UNITS/HOUR
INSULIN ADMINSTERED, INSADM: 2.3 UNITS/HOUR
INSULIN ADMINSTERED, INSADM: 2.4 UNITS/HOUR
INSULIN ADMINSTERED, INSADM: 2.6 UNITS/HOUR
INSULIN ADMINSTERED, INSADM: 2.6 UNITS/HOUR
INSULIN ADMINSTERED, INSADM: 3.2 UNITS/HOUR
INSULIN ADMINSTERED, INSADM: 3.4 UNITS/HOUR
INSULIN ADMINSTERED, INSADM: 3.8 UNITS/HOUR
INSULIN ADMINSTERED, INSADM: 4 UNITS/HOUR
INSULIN ADMINSTERED, INSADM: 4 UNITS/HOUR
INSULIN ADMINSTERED, INSADM: 4.8 UNITS/HOUR
INSULIN ADMINSTERED, INSADM: 4.8 UNITS/HOUR
INSULIN ADMINSTERED, INSADM: 5.3 UNITS/HOUR
INSULIN ADMINSTERED, INSADM: 5.5 UNITS/HOUR
INSULIN ADMINSTERED, INSADM: 5.5 UNITS/HOUR
INSULIN ADMINSTERED, INSADM: 5.6 UNITS/HOUR
INSULIN ADMINSTERED, INSADM: 8.9 UNITS/HOUR
INSULIN ORDER, INSORD: 0 UNITS/HOUR
INSULIN ORDER, INSORD: 1.2 UNITS/HOUR
INSULIN ORDER, INSORD: 1.2 UNITS/HOUR
INSULIN ORDER, INSORD: 1.3 UNITS/HOUR
INSULIN ORDER, INSORD: 1.3 UNITS/HOUR
INSULIN ORDER, INSORD: 1.8 UNITS/HOUR
INSULIN ORDER, INSORD: 2.3 UNITS/HOUR
INSULIN ORDER, INSORD: 2.4 UNITS/HOUR
INSULIN ORDER, INSORD: 2.6 UNITS/HOUR
INSULIN ORDER, INSORD: 2.6 UNITS/HOUR
INSULIN ORDER, INSORD: 3.2 UNITS/HOUR
INSULIN ORDER, INSORD: 3.4 UNITS/HOUR
INSULIN ORDER, INSORD: 3.8 UNITS/HOUR
INSULIN ORDER, INSORD: 4 UNITS/HOUR
INSULIN ORDER, INSORD: 4 UNITS/HOUR
INSULIN ORDER, INSORD: 4.8 UNITS/HOUR
INSULIN ORDER, INSORD: 4.8 UNITS/HOUR
INSULIN ORDER, INSORD: 5.3 UNITS/HOUR
INSULIN ORDER, INSORD: 5.5 UNITS/HOUR
INSULIN ORDER, INSORD: 5.5 UNITS/HOUR
INSULIN ORDER, INSORD: 5.6 UNITS/HOUR
INSULIN ORDER, INSORD: 8.9 UNITS/HOUR
LOW TARGET, LOT: 100 MG/DL
LOW TARGET, LOT: 95 MG/DL
MAGNESIUM SERPL-MCNC: 2 MG/DL (ref 1.6–2.4)
MCH RBC QN AUTO: 24 PG (ref 26–34)
MCHC RBC AUTO-ENTMCNC: 31.1 G/DL (ref 30–36.5)
MCV RBC AUTO: 77.1 FL (ref 80–99)
MINUTES UNTIL NEXT BG, NBG: 15 MIN
MINUTES UNTIL NEXT BG, NBG: 60 MIN
MULTIPLIER, MUL: 0.03
MULTIPLIER, MUL: 0.04
MULTIPLIER, MUL: 0.05
MULTIPLIER, MUL: 0.06
MULTIPLIER, MUL: 0.07
MULTIPLIER, MUL: 0.08
NRBC # BLD: 0.02 K/UL (ref 0–0.01)
NRBC BLD-RTO: 0.1 PER 100 WBC
ORDER INITIALS, ORDINIT: NORMAL
PLATELET # BLD AUTO: 219 K/UL (ref 150–400)
PMV BLD AUTO: 11.3 FL (ref 8.9–12.9)
POTASSIUM SERPL-SCNC: 4.1 MMOL/L (ref 3.5–5.1)
PROT SERPL-MCNC: 6.5 G/DL (ref 6.4–8.2)
RBC # BLD AUTO: 2.92 M/UL (ref 4.1–5.7)
SERVICE CMNT-IMP: ABNORMAL
SERVICE CMNT-IMP: NORMAL
SODIUM SERPL-SCNC: 136 MMOL/L (ref 136–145)
WBC # BLD AUTO: 21.9 K/UL (ref 4.1–11.1)

## 2018-07-11 PROCEDURE — 97530 THERAPEUTIC ACTIVITIES: CPT

## 2018-07-11 PROCEDURE — 74011250637 HC RX REV CODE- 250/637: Performed by: THORACIC SURGERY (CARDIOTHORACIC VASCULAR SURGERY)

## 2018-07-11 PROCEDURE — 74011250636 HC RX REV CODE- 250/636: Performed by: NURSE PRACTITIONER

## 2018-07-11 PROCEDURE — 85027 COMPLETE CBC AUTOMATED: CPT | Performed by: NURSE PRACTITIONER

## 2018-07-11 PROCEDURE — 36415 COLL VENOUS BLD VENIPUNCTURE: CPT | Performed by: NURSE PRACTITIONER

## 2018-07-11 PROCEDURE — 83735 ASSAY OF MAGNESIUM: CPT | Performed by: NURSE PRACTITIONER

## 2018-07-11 PROCEDURE — 74011000258 HC RX REV CODE- 258: Performed by: THORACIC SURGERY (CARDIOTHORACIC VASCULAR SURGERY)

## 2018-07-11 PROCEDURE — 71045 X-RAY EXAM CHEST 1 VIEW: CPT

## 2018-07-11 PROCEDURE — 82962 GLUCOSE BLOOD TEST: CPT

## 2018-07-11 PROCEDURE — 74011250637 HC RX REV CODE- 250/637: Performed by: NURSE PRACTITIONER

## 2018-07-11 PROCEDURE — 65660000000 HC RM CCU STEPDOWN

## 2018-07-11 PROCEDURE — 77030034850

## 2018-07-11 PROCEDURE — 80053 COMPREHEN METABOLIC PANEL: CPT | Performed by: NURSE PRACTITIONER

## 2018-07-11 PROCEDURE — 74011250636 HC RX REV CODE- 250/636: Performed by: THORACIC SURGERY (CARDIOTHORACIC VASCULAR SURGERY)

## 2018-07-11 PROCEDURE — 51798 US URINE CAPACITY MEASURE: CPT

## 2018-07-11 PROCEDURE — 97110 THERAPEUTIC EXERCISES: CPT

## 2018-07-11 PROCEDURE — 74011636637 HC RX REV CODE- 636/637: Performed by: THORACIC SURGERY (CARDIOTHORACIC VASCULAR SURGERY)

## 2018-07-11 RX ORDER — SODIUM CHLORIDE 0.9 % (FLUSH) 0.9 %
5-10 SYRINGE (ML) INJECTION AS NEEDED
Status: DISCONTINUED | OUTPATIENT
Start: 2018-07-11 | End: 2018-07-18 | Stop reason: HOSPADM

## 2018-07-11 RX ORDER — HYDRALAZINE HYDROCHLORIDE 20 MG/ML
20 INJECTION INTRAMUSCULAR; INTRAVENOUS
Status: DISCONTINUED | OUTPATIENT
Start: 2018-07-11 | End: 2018-07-18 | Stop reason: HOSPADM

## 2018-07-11 RX ORDER — SODIUM CHLORIDE 0.9 % (FLUSH) 0.9 %
5-10 SYRINGE (ML) INJECTION EVERY 8 HOURS
Status: DISCONTINUED | OUTPATIENT
Start: 2018-07-11 | End: 2018-07-18 | Stop reason: HOSPADM

## 2018-07-11 RX ORDER — TAMSULOSIN HYDROCHLORIDE 0.4 MG/1
0.4 CAPSULE ORAL DAILY
Status: DISCONTINUED | OUTPATIENT
Start: 2018-07-11 | End: 2018-07-18 | Stop reason: HOSPADM

## 2018-07-11 RX ORDER — HYDRALAZINE HYDROCHLORIDE 20 MG/ML
10 INJECTION INTRAMUSCULAR; INTRAVENOUS
Status: DISCONTINUED | OUTPATIENT
Start: 2018-07-11 | End: 2018-07-18 | Stop reason: HOSPADM

## 2018-07-11 RX ADMIN — MUPIROCIN: 20 OINTMENT TOPICAL at 18:38

## 2018-07-11 RX ADMIN — CHLORHEXIDINE GLUCONATE 10 ML: 1.2 RINSE ORAL at 18:38

## 2018-07-11 RX ADMIN — Medication 500 MCG: at 08:28

## 2018-07-11 RX ADMIN — SODIUM CHLORIDE 1.8 UNITS/HR: 900 INJECTION, SOLUTION INTRAVENOUS at 12:32

## 2018-07-11 RX ADMIN — HYDRALAZINE HYDROCHLORIDE 20 MG: 20 INJECTION INTRAMUSCULAR; INTRAVENOUS at 07:57

## 2018-07-11 RX ADMIN — ASPIRIN 81 MG 81 MG: 81 TABLET ORAL at 08:28

## 2018-07-11 RX ADMIN — Medication 400 MG: at 18:38

## 2018-07-11 RX ADMIN — SENNOSIDES AND DOCUSATE SODIUM 1 TABLET: 8.6; 5 TABLET ORAL at 18:38

## 2018-07-11 RX ADMIN — MUPIROCIN: 20 OINTMENT TOPICAL at 08:46

## 2018-07-11 RX ADMIN — OXYCODONE HYDROCHLORIDE 10 MG: 5 TABLET ORAL at 21:44

## 2018-07-11 RX ADMIN — HYDRALAZINE HYDROCHLORIDE 10 MG: 20 INJECTION INTRAMUSCULAR; INTRAVENOUS at 02:18

## 2018-07-11 RX ADMIN — CHLORHEXIDINE GLUCONATE 10 ML: 1.2 RINSE ORAL at 08:46

## 2018-07-11 RX ADMIN — Medication 400 MG: at 08:27

## 2018-07-11 RX ADMIN — IRON SUCROSE 100 MG: 20 INJECTION, SOLUTION INTRAVENOUS at 09:21

## 2018-07-11 RX ADMIN — OXYCODONE HYDROCHLORIDE 10 MG: 5 TABLET ORAL at 02:26

## 2018-07-11 RX ADMIN — Medication 10 ML: at 21:47

## 2018-07-11 RX ADMIN — ATORVASTATIN CALCIUM 20 MG: 20 TABLET, FILM COATED ORAL at 21:43

## 2018-07-11 RX ADMIN — SENNOSIDES AND DOCUSATE SODIUM 1 TABLET: 8.6; 5 TABLET ORAL at 08:28

## 2018-07-11 RX ADMIN — Medication 2 G: at 00:00

## 2018-07-11 RX ADMIN — OXYCODONE HYDROCHLORIDE AND ACETAMINOPHEN 500 MG: 500 TABLET ORAL at 08:27

## 2018-07-11 RX ADMIN — FAMOTIDINE 20 MG: 20 TABLET ORAL at 08:27

## 2018-07-11 RX ADMIN — METOPROLOL TARTRATE 50 MG: 50 TABLET ORAL at 21:43

## 2018-07-11 RX ADMIN — Medication 2 G: at 05:44

## 2018-07-11 RX ADMIN — POLYETHYLENE GLYCOL 3350 17 G: 17 POWDER, FOR SOLUTION ORAL at 08:28

## 2018-07-11 RX ADMIN — METOPROLOL TARTRATE 50 MG: 50 TABLET ORAL at 08:27

## 2018-07-11 RX ADMIN — TAMSULOSIN HYDROCHLORIDE 0.4 MG: 0.4 CAPSULE ORAL at 12:33

## 2018-07-11 RX ADMIN — FOLIC ACID 1 MG: 1 TABLET ORAL at 08:27

## 2018-07-11 NOTE — PROGRESS NOTES
Problem: Mobility Impaired (Adult and Pediatric)  Goal: *Acute Goals and Plan of Care (Insert Text)  Physical Therapy Goals    Initiated 07/10/18   1. Patient will move from supine to sit and sit to supine , scoot up and down and roll side to side in bed with modified independence within 5 day(s). 2.  Patient will transfer from bed to chair and chair to bed with modified independence using the least restrictive device within 5 day(s). 3.  Patient will perform sit to stand with modified independence within 5 day(s). 4.  Patient will ambulate with modified independence for 300 feet with the least restrictive device within 5 day(s). 5.  Patient will ascend/descend 12 stairs with one handrail(s) with modified independence within 5 day(s). 6.  Patient will perform cardiac exercises per protocol with independence within 5 days. 7.  Patient will verbally and functionally recall 3/3 sternal precautions within 5 days. physical Therapy TREATMENT  Patient: [address] [place] [address] (69 y.o. male)  Date: 7/11/2018  Diagnosis: CAD   CAD (coronary artery disease) S/P CABG x 3  Procedure(s) (LRB):  CORONARY ARTERY BYPASS GRAFT x 3 with Left Internal Mammary Artery and Right Greater Saphenous Vein - Transesphageal EchoCardiogram and EpiAortic Ultrsound performed by Dr. Paco Nunez (N/A) 2 Days Post-Op  Precautions: Fall, Sternal  Chart, physical therapy assessment, plan of care and goals were reviewed. ASSESSMENT:  Chart reviewed, RN cleared patient for mobility, and patient received in bed. Patient performed LE ther ex well, demos fair strength with single leg press and bridging. During 4 standing trials patient required Min A x 1 to stand but Max A x 1 immediately following due to BLE buckling. Patient stood ~5-6 min total during session with RR increasing into 40s, recovering to high 20s/low30s in sitting. Patient denies specific weakness, numbness, tingling, or other adverse signs/symptoms in BLE and back.  Patient ended session in bed with all needs placed within reach and RN notified of patient's status. Discharge rec - rehab vs TBD pending progress with mobility   Progression toward goals:  []    Improving appropriately and progressing toward goals  [x]    Improving slowly and progressing toward goals  []    Not making progress toward goals and plan of care will be adjusted     PLAN:  Patient continues to benefit from skilled intervention to address the above impairments. Continue treatment per established plan of care. Discharge Recommendations:  Rehab vs TBD  Further Equipment Recommendations for Discharge:  TBD     SUBJECTIVE:   Patient stated much better.  after sitting    OBJECTIVE DATA SUMMARY:   Critical Behavior:  Neurologic State: Alert  Orientation Level: Oriented X4  Cognition: Appropriate for age attention/concentration, Appropriate safety awareness, Follows commands  Safety/Judgement: Awareness of environment, Fall prevention  Functional Mobility Training:  Bed Mobility:  Rolling: Moderate assistance  Supine to Sit:  (bed mechanics)  Sit to Supine:  (bed mechanics)  Scooting: Stand-by assistance; Additional time (cues for sternal precautions)        Transfers:  Sit to Stand: Maximum assistance (Min to stand but immediate Max to maintain balance)  Stand to Sit: Minimum assistance        Bed to Chair:  (bed mechanics)                    Balance:  Sitting: Intact  Standing: Impaired  Standing - Static: Constant support;Fair;Poor (posterior weight shift, BLE buckling 2/4 trials)  Standing - Dynamic : Poor  Ambulation/Gait Training:                                                        Stairs:              Neuro Re-Education:    Therapeutic Exercises:   Single leg press x 10 each  Bridging x 10 each    Pain:  Pain Scale 1: Numeric (0 - 10)  Pain Intensity 1: 3  Pain Location 1: Incisional  Pain Orientation 1: Anterior  Pain Description 1: Aching  Pain Intervention(s) 1: Repositioned; Rest  Activity Tolerance:   Fair, unsteady in standing  Please refer to the flowsheet for vital signs taken during this treatment.   After treatment:   []    Patient left in no apparent distress sitting up in chair  [x]    Patient left in no apparent distress in bed  [x]    Call bell left within reach  [x]    Nursing notified  []    Caregiver present  []    Bed alarm activated    COMMUNICATION/COLLABORATION:   The patients plan of care was discussed with: Registered Nurse    Haven Eng PT, DPT   Time Calculation: 23 mins

## 2018-07-11 NOTE — PROGRESS NOTES
Problem: Discharge Planning  Goal: *Discharge to safe environment  Outcome: Progressing Towards Goal  See CM note.     Stefania Meeks MS

## 2018-07-11 NOTE — PROGRESS NOTES
[facility] ICU Progress Note    Admit Date: 2018  POD:  2 Day Post-Op    Procedure:  Procedure(s):  CORONARY ARTERY BYPASS GRAFT x 3 with Left Internal Mammary Artery and Right Greater Saphenous Vein - Transesphageal EchoCardiogram and EpiAortic Ultrsound performed by Dr. Simona Elizabeth        Subjective:   Pt seen with Dr. Kesha Dwyer. On amio 0.5, insulin, cardene 2.5. Tmax 99.6, on 6 L NC. Objective:   Vitals:  Blood pressure 124/42, pulse 81, temperature 98.7 °F (37.1 °C), resp. rate 26, height 5' 3\" (1.6 m), weight 138 lb 7.2 oz (62.8 kg), SpO2 99 %. Temp (24hrs), Av.6 °F (37 °C), Min:97.8 °F (36.6 °C), Max:99.6 °F (37.6 °C)    Hemodynamics:   CO: CO (l/min): 3.7 l/min   CI: CI (l/min/m2): 2.2 l/min/m2   CVP: CVP (mmHg): 13 mmHg (07/10/18 1620)   SVR: SVR (dyne*sec)/cm5: 1263 (dyne*sec)/cm5 (07/10/18 5668)   PAP Systolic: PAP Systolic: 33 (85/98/48 9798)   PAP Diastolic: PAP Diastolic: 13 (70/25/33 1799)   PVR:     SV02: SVO2 (%): 47 % (07/10/18 1620)   SCV02:      EKG/Rhythm:  SR     CT Output: 270 ml/24 hrs     Oxygen Therapy:  Oxygen Therapy  O2 Sat (%): 99 % (18 07)  Pulse via Oximetry: 81 beats per minute (18 07)  O2 Device: Nasal cannula (18)  O2 Flow Rate (L/min): 6 l/min (18 06)  FIO2 (%): 40 % (18 1214)    CXR:   CXR Results  (Last 48 hours)               18 1157  XR CHEST PORT Final result    Impression:  IMPRESSION: Satisfactory postoperative appearance. Narrative:  EXAM: Portable CXR.  1156 hours. INDICATION: postop heart CABG x3 for coronary artery disease 2018. ET tube is satisfactory. NG tube in the stomach. [place]field tip is in the main PA. Chest drains are present. There is no pneumothorax or pulmonary edema. Cardiomediastinum shows no   enlargement. There is mild atelectasis.                    Admission Weight: Last Weight   Weight: 143 lb (64.9 kg) Weight: 138 lb 7.2 oz (62.8 kg)     Intake / Output / Drain:  Current Shift: Last 24 hrs.:     Intake/Output Summary (Last 24 hours) at 18 0732  Last data filed at 18 0700   Gross per 24 hour   Intake          2590.78 ml   Output             1255 ml   Net          1335.78 ml       EXAM:  General:  No obvious distress                                                                                        Lungs:   Clear to auscultation bilaterally, diminished in bases. Incision:  Drs C/D/I   Heart:  Regular rate and rhythm, S1, S2 normal, +4/3 murmur, click, rub or gallop. Abdomen:   Soft, non-tender. Bowel sounds hypoactive. No masses,  No organomegaly. Extremities:  No edema. PPP. Neurologic:  Gross motor and sensory apparatus intact. Labs:   Recent Labs      18   0651   18   0540   18   1159   WBC   --    --   21.9*   < >  19.3*   HGB   --    --   7.0*   < >  7.4*   HCT   --    --   22.5*   < >  24.4*   PLT   --    --   219   < >  214   NA   --    --   136   < >  143   K   --    --   4.1   < >  4.5   BUN   --    --   24*   < >  13   CREA   --    --   1.76*   < >  1.40*   GLU   --    --   121*   < >  97   GLUCPOC  104*   < >   --    < >   --    INR   --    --    --    --   1.3*    < > = values in this interval not displayed. Assessment:     Principal Problem:    S/P CABG x 3 (2018)      Overview: Coronary Artery Bypass Grafting x 3, LIMA to LAD, RSVG to OM, RSVG to RCA      Right GSV EVH    Active Problems:    CAD, multiple vessel (2018)      CAD (coronary artery disease) (2018)         Plan/Recommendations/Medical Decision Makin. S/p CABG x 3:  On asa, statin, and BB. 2. HTN:  Wean cardene for SBP < 160, increase BB. PRN hydralazine, Monitor   3. Atelectasis: wean oxygen for 02 sat > 92%. Increase IS and activity as tolerated. 4. DM: Hgba1c 7.4. Cont insulin gtt per protocol. DTC consult. Will need to resume home meds as appropriate.    5. Acute anemia s/t post op blood loss & iron deficiency/B12 def: Recently had EGD/colonscopy after discovery of anemia during hospitalization. Showed gastritis. Cont pepcid. Cont IV iron, PO V97, folic acid and Vit C(caution d/t renal insuff). Monitor H/H and CT output. Hold on pRBC transfusion for now per Blade. 6. Hyperlipidemia: resume statin. 7. Renal insufficiency:  Creat increased today. Monitor. Avoid nephrotoxic meds. Known recently to have hyperkalemia on outpt labs. K stable today. 8.  Dispo: PT/OT. Transfer to stepdown. DC CT today.     Signed By: Floyd Grier, NP

## 2018-07-11 NOTE — PROGRESS NOTES
1530: TRANSFER - IN REPORT:    Verbal report received from Siena Banegas [facility] (name) on Truman Shed  being received from CVICU (unit) for routine progression of care      Report consisted of patients Situation, Background, Assessment and   Recommendations(SBAR). Information from the following report(s) SBAR, Kardex, Intake/Output, MAR, Recent Results, Med Rec Status and Cardiac Rhythm NSR was reviewed with the receiving nurse. Opportunity for questions and clarification was provided. Assessment completed upon patients arrival to unit and care assumed.

## 2018-07-11 NOTE — PROGRESS NOTES
Problem: CABG: Post-Op Day 2/Transfer Day  Goal: Activity/Safety  Outcome: Progressing Towards Goal  Reminding pt of sternal precautions; pt aware of safety limitations   Goal: Nutrition/Diet  Outcome: Progressing Towards Goal  Pt ate some of dinner; will encourage pt to eat as much breakfast as he can tolerate  Goal: Medications  Outcome: Not Progressing Towards Goal  Restarted cardene for sustained SBP of 170; metoprolol dosage modified by Dr. Ailene Severance  Goal: Respiratory  Outcome: Progressing Towards Goal  Pt remains on 4L NC, sats: 99%  Goal: *Hemodynamically stable without vasoactive medications  Outcome: Progressing Towards Goal  No vasoactive medications currently   Goal: *Lungs clear or at baseline  Outcome: Progressing Towards Goal  Lungs clear bilaterally   Goal: *Optimal pain control at patient's stated goal  Outcome: Progressing Towards Goal  Utilizing numeric pain scale 0-10; explained to pt that the pain will stay around a 2-3/10 in hopes keep the pt comfortable and pain tolerable but will not be without any pain postop. Using Roxicodone as pain medication. Problem: Pressure Injury - Risk of  Goal: *Prevention of pressure injury  Document Mike Scale and appropriate interventions in the flowsheet. Outcome: Progressing Towards Goal  Pressure Injury Interventions:  Sensory Interventions: Assess changes in LOC, Avoid rigorous massage over bony prominences, Check visual cues for pain, Maintain/enhance activity level, Minimize linen layers    Moisture Interventions: Absorbent underpads, Check for incontinence Q2 hours and as needed    Activity Interventions: Assess need for specialty bed, Increase time out of bed    Mobility Interventions: Assess need for specialty bed, Float heels, Pressure redistribution bed/mattress (bed type), Turn and reposition approx.  every two hours(pillow and wedges)    Nutrition Interventions: Document food/fluid/supplement intake, Offer support with meals,snacks and hydration    Friction and Shear Interventions: Lift sheet, Minimize layers        Areas of pressure offloaded using pillows and turning Q2           Problem: Falls - Risk of  Goal: *Absence of Falls  Document Laverne Fall Risk and appropriate interventions in the flowsheet.    Outcome: Progressing Towards Goal  Fall Risk Interventions:  Mobility Interventions: Assess mobility with egress test, Communicate number of staff needed for ambulation/transfer         Medication Interventions: Assess postural VS orthostatic hypotension, Evaluate medications/consider consulting pharmacy, Patient to call before getting OOB, Teach patient to arise slowly, Utilize gait belt for transfers/ambulation    Elimination Interventions: Call light in reach, Patient to call for help with toileting needs, Toileting schedule/hourly rounds, Urinal in reach

## 2018-07-11 NOTE — DIABETES MGMT
DTC Cardiac Surgery Progress Note     Recommendations/ Comments:      Pt arrived to CVICU at 1143 on 7-9-18. Consider continuing insulin gtt for at least 48hrs post-op and eating 50% solid foods then,  1) transition off gtt per [employer] Protocol   2) continue accu-checks and humalog correctional insulin ac & hs   3) ADA/AHA diet as diet advanced  4) resume glipizide once eating and off of drip     Consider decreasing current's diet caloric content to 1800 calories    Insulin gtt should not be stopped until after 7-11-16 at 1143 to complete 48hr post-op time frame. Currently on insulin gtt. Current drip rate is 2.3 units per hour and blood sugar at 0918 was 109 mg/dl. Chart reviewed on Neal Kendall. Patient is 78 y.o. male s/p CABG  POD2. Pt with a known history of diabetes on Metformin 1000 mg BID, Glipizide 10 mg BID and Januvia 50 mg at home. A1c:   Lab Results   Component Value Date/Time    Hemoglobin A1c 7.4 (H) 06/22/2018 01:53 PM         Recent Glucose Results:   Lab Results   Component Value Date/Time     (H) 07/11/2018 05:40 AM    GLUCPOC 109 (H) 07/11/2018 09:17 AM    GLUCPOC 87 07/11/2018 08:10 AM    GLUCPOC 104 (H) 07/11/2018 06:51 AM        Lab Results   Component Value Date/Time    Creatinine 1.76 (H) 07/11/2018 05:40 AM     Estimated Creatinine Clearance: 33.2 mL/min (based on Cr of 1.76). Active Orders   Diet    DIET CARDIAC Regular; 2 GM NA (House Low NA); Consistent Carb 2000kcal        PO intake:   Patient Vitals for the past 72 hrs:   % Diet Eaten   07/11/18 0933 10 %       Will continue to follow as needed. Thank you.   Alonso Pope RD

## 2018-07-11 NOTE — PROGRESS NOTES
Reason for Readmission:     Patient is a planned readmit. Patient was originally referred for cardiac eval by Dr. Waldemar Pastor for progressive DONALDSON and SOB. Patient has a history of CAD and NSTEMI in 2016. Today, patient is post op day 1 CABG x 3 and Transesphageal EchoCardiogram and EpiAortic Ultrasound. RRAT Score and Risk Level:   20-High-Red      Level of Readmission:     Level 1      Care Conference scheduled:   No       Resources/supports as identified by patient/family:   Patient is supported by his wife. Patient's PCP is Dr. Mary Washburn. Top Challenges facing patient (as identified by patient/family and CM): None    Finances/Medication cost?     Patient is employed and has [employer]. Transportation       Patient's wife will assist with transportation needs. Support system or lack thereof? Patient is supported by his wife. Living arrangements? Patient lives at home with his wife. Self-care/ADLs/Cognition? Patient is independent with self-care and ADLs and is alert to all spheres. Current Advanced Directive/Advance Care Plan:  Patient does not have an AMD. Patient is full code. Plan for utilizing home health:   Patient has elected [facility] - INPAT[facility], referral completed. Likelihood of additional readmission:   Moderate             Transition of Care Plan:    Based on readmission, the patient's previous Plan of Care   has been evaluated and/or modified. The current Transition of Care Plan is:       Patient to discharge home with wife's support and home health care. Patient and wife will inform CM of inpatient rehab choice in case this is needed.      * CM contacted patient's wife Lenore Soares via phone call to discuss the above info in addition to face to face assessment with patient*    Ez Arteaga MS

## 2018-07-11 NOTE — PROGRESS NOTES
CTx3 removed without issue. Bandage replaced. No rhythm issues and BB already initiated. Not paced since immediately post op, so AV wires removed. A wires removed without issue. V wire cut due to significant resistance. Hemodynamics stable. Patient to remain in bed x 1 hour, 1100.

## 2018-07-11 NOTE — PROGRESS NOTES
Occupational Therapy Note 1003    Chart reviewed. Patient preparing to be de-lined, will follow up with OT treatment as able and appropriate this afternoon.     Thank you  Wali Nye OT

## 2018-07-11 NOTE — PROGRESS NOTES
Cardiac Surgery Care Coordinator-  Met with Lamont Shelton. Reviewed plan of care and discussed goals for the day. Lamont Shelton has a fair understanding of his plan for the day. Reinforced sternal precautions and encouraged continued use of the incentive spirometer. Will continue to follow for educational and emotional needs.  Paulino Huang RN

## 2018-07-11 NOTE — PROGRESS NOTES
7230: Bedside and Verbal shift change report given to Arsen Lazo, LORI and Mariangel Sheets RN by Leonel Hebert RN. Report included the following information SBAR, Kardex, Intake/Output, MAR, Recent Results, Cardiac Rhythm NSR and Alarm Parameters . Patient sitting up in chair, appears calm. 3015Christi Murray, JACOB rounding. Orders to d/c central and arterial line and transfer out to CVSU.    1000: Patient AO and following commands, however extremely difficult to transfer to bed from chair. Unable to lift feet off ground. Once in bed, patient is able to bilaterally lift/move legs. 1015: SCAR Simpson at bedside, updated on pacemaker and CT output. CT d/c and V wires cut. 1030: Donata Mention called and updated on patient's bladder scan, 24h post donnelly removal. Still not voiding independently, straight cathed x2. Orders to place indwelling donnelly. Will start floumax today. 1500: TRANSFER - OUT REPORT:    Verbal report given to CAROL RN on Dorothy Dey  being transferred to CVSU for routine progression of care       Report consisted of patients Situation, Background, Assessment and   Recommendations(SBAR). Information from the following report(s) SBAR, Kardex, Intake/Output, MAR, Recent Results, Cardiac Rhythm NSR and Alarm Parameters  was reviewed with the receiving nurse. Lines:   Peripheral IV 07/09/18 Left Hand (Active)   Site Assessment Clean, dry, & intact 7/11/2018  8:00 AM   Phlebitis Assessment 0 7/11/2018  8:00 AM   Infiltration Assessment 0 7/11/2018  8:00 AM   Dressing Status Clean, dry, & intact 7/11/2018  8:00 AM   Dressing Type Transparent 7/11/2018  8:00 AM   Hub Color/Line Status Pink;Patent; Infusing 7/11/2018  8:00 AM   Action Taken Open ports on tubing capped 7/11/2018  8:00 AM   Alcohol Cap Used Yes 7/11/2018  8:00 AM       Peripheral IV 07/10/18 Left; Inner; Lower Arm (Active)   Site Assessment Clean, dry, & intact 7/11/2018  8:00 AM   Phlebitis Assessment 0 7/11/2018  8:00 AM   Infiltration Assessment 0 7/11/2018  8:00 AM   Dressing Status Clean, dry, & intact 7/11/2018  8:00 AM   Dressing Type Transparent;Tape 7/11/2018  8:00 AM   Hub Color/Line Status Pink;Patent; Infusing 7/11/2018  8:00 AM   Action Taken Open ports on tubing capped 7/11/2018  8:00 AM   Alcohol Cap Used Yes 7/11/2018  8:00 AM        Opportunity for questions and clarification was provided.       Patient transported with:   Monitor  O2 @ 6 liters  Registered Nurse and Respiratory Therapist

## 2018-07-11 NOTE — PROGRESS NOTES
2000: Report received from Julian Pierre RN. Parameters to keep SBP <140.     2130: Dr. Genaro Matos made aware arterial line BP reading 173/49 and cuff BP reading 20 points lower on both arms (147/50). Use arterial BP for medications and leave line in until morning. Orders received to give 50 mg BID ordered, give 37.5 mg now to lower BP.     2200: Cardene restarted. BP: 174/53 (Arterial line)    0018: Pt straight catheterized per request, pt attempted to urinate in urinal and stated, \"This never works here, please just do the tube and it will work. \" Bladder scanned for 291 ml. Straight catheter removed 400 ml.    0230: Attempting to wean cardene, pt given 10 mg hydralazine, SBP: 150's currently 's.     0645: Attempted to get pt up to chair, pt unsteady on feet, pt tachypneic, RR: 33 and O2 sats decreased from 98 to 86. Placed on 6L NC.    0700: Pt resting in bed, O2 sat: 100%. RR: 30. SBP: 150's on 2.5 mg of cardene. 0730: Dr. Candelaria Rosenthal at bedside. Updated on pt status. Orders to keep SBP <160, give 20 mg hydralazine and stop cardene gtts. Plan to deline today. 0800: Bedside and Verbal shift change report given to      Kayla Abbott RN and Darlene Scott RN (oncoming nurse). Report included the following information SBAR, ED Summary, OR Summary, Procedure Summary, Intake/Output, Recent Results and Cardiac Rhythm NSR.

## 2018-07-11 NOTE — PROGRESS NOTES
2000: Report received from Escobar Mendoza, LORI.     2130: Dr. Sylvia Magana made aware arterial line BP reading 173/49 and cuff BP reading 20 points lower on both arms (147/50). Use arterial BP for medications and leave line in until morning. Orders received to give 50 mg BID ordered, give 37.5 mg now to lower BP.     2200: Cardene restarted. BP: 174/53 (Arterial line)    0018: Pt straight catheterized per request, pt attempted to urinate in urinal and stated, \"This never works here, please just do the tube and it will work. \" Bladder scanned for 291 ml. Straight catheter removed 400 ml.    0230: Attempting to wean cardene, pt given 10 mg hydralazine, SBP: 150's currently 's.     0645: Attempted to get pt up to chair, pt unsteady on feet, pt tachypneic, RR: 33 and O2 sats decreased from 98 to 86. Placed on 6L NC.    0700: Pt resting in bed, O2 sat: 100%. RR: 30. SBP: 150's on 2.5 mg of cardene. 0730: Dr. Jakob Mccauley at bedside. Updated on pt status. Orders to keep SBP <160, give 20 mg hydralazine and stop cardene gtts. Plan to deline today. 0800: Bedside and Verbal shift change report given to      Emelyn Morgan, RN and Reyes Forte, RN (oncoming nurse). Report included the following information SBAR, ED Summary, OR Summary, Procedure Summary, Intake/Output, Recent Results and Cardiac Rhythm NSR.

## 2018-07-11 NOTE — PROGRESS NOTES
Problem: Self Care Deficits Care Plan (Adult)  Goal: *Acute Goals and Plan of Care (Insert Text)  Occupational Therapy Goals  Initiated 7/10/2018  1. Patient will perform ADLs standing 5 mins without fatigue or LOB with supervision/set-up within 7 day(s). 2.  Patient will perform lower body ADLs with minimal assistance/contact guard assist and AE PRN within 7 day(s). 3.  Patient will perform upper body dressing with modified independence within 7 day(s). 4.  Patient will perform toilet transfers with supervision/set-up within 7 day(s). 5.  Patient will perform all aspects of toileting with supervision within 7 day(s). 6.  Patient will participate in cardiac/sternal upper extremity therapeutic exercise/activities to increase independence with ADLs with supervision/set-up for 5 minutes within 7 day(s). Occupational Therapy TREATMENT  Patient: Chery Padilla (69 y.o. male)  Date: 7/11/2018  Diagnosis: CAD   CAD (coronary artery disease) S/P CABG x 3  Procedure(s) (LRB):  CORONARY ARTERY BYPASS GRAFT x 3 with Left Internal Mammary Artery and Right Greater Saphenous Vein - Transesphageal EchoCardiogram and EpiAortic Ultrsound performed by Dr. Stella Norton (N/A) 2 Days Post-Op  Precautions: Fall, Sternal  Chart, occupational therapy assessment, plan of care, and goals were reviewed. ASSESSMENT:  Patient received in modified bed in chair, agreeable to therapy. Noted tachypnea this session on 6L NC, RR 22-40 with activity, 17-22 at rest, requiring multiple rest breaks and MAX verbal/visual cues for PLB & pacing. Completed 4/6 BUE cardiac exercises in unsupported sitting, patient with continued elevated RR rate, noted DONALDSON with activity, returned to supine with bed mechanics. Noted, per PCT & RN report, patient requiring increased assist to stand this morning, not completed this session d/t tachypnea. Pending progress with activity tolerance and medical stability, recommend home health vs. brief rehab stay upon d/c. Progression toward goals:  []       Improving appropriately and progressing toward goals  [x]       Improving slowly and progressing toward goals  []       Not making progress toward goals and plan of care will be adjusted     PLAN:  Patient continues to benefit from skilled intervention to address the above impairments. Continue treatment per established plan of care. Discharge Recommendations: Home Health vs. Rehab   Further Equipment Recommendations for Discharge:  TBD     SUBJECTIVE:   Patient stated No I feel fine.  patient stating with visible DONALDSON, accessory muscle contraction    The patient stated 1/3 sternal precautions. Reviewed all 3 with patient. OBJECTIVE DATA SUMMARY:   Cognitive/Behavioral Status:  Neurologic State: Alert  Orientation Level: Oriented X4  Cognition: Appropriate for age attention/concentration; Appropriate safety awareness; Follows commands  Perception: Appears intact  Perseveration: No perseveration noted  Safety/Judgement: Awareness of environment; Fall prevention    Functional Mobility and Transfers for ADLs:  Bed Mobility:  Rolling: Moderate assistance  Supine to Sit:  (bed mechanics)  Sit to Supine:  (bed mechanics)  Scooting: Stand-by assistance; Additional time (cues for sternal precautions)    Transfers:  Sit to Stand:  (NT d/t increased assist & tachypnea)      Bed to Chair:  (bed mechanics)  Balance:  Sitting: Intact; Without support  Standing:  (NT d/t increased assist required, tachypnea)    ADL Intervention:  [address]: Total assistance(dependent)  Bladder Hygiene: Total assistance (dependent) (donnelly)    Cognitive Retraining  Safety/Judgement: Awareness of environment; Fall prevention    Patient instructed no asymmetrical reaching over head to ensure B UEs when shoulders >90* i.e. reaching in cabinets and dressing. Instruction on upper body dressing techniques of over head, then arms through to decrease pain and unilateral shoulder flexion >90*. Instruction on the benefits of utilizing B UEs during functional tasks i.e. opening the fridge, stepping into the tub. Instruction if continued pain at home with shoulder IR for BM hygiene can use wet wipes and toilet tongs PRN. Avoid valsalva maneuvers. Increase activity tolerance for home, work, and sexual intercourse by pacing self with increasing the arm exercises, sitting duration, frequency OOB, walking, standing, and ADLs. Instructed and indicated understanding of s/s of too much activity, how to respond to s/s safely. Therapeutic Exercises:   Patient instructed on the benefits and demonstrated cardiac exercises while seated EOB with multiple rest breaks & Supervision. Instructed and indicated understanding on how to progress reps, sets against gravity, working up to 5 lbs, standing and so on based on surgeon clearance for more weight in prep for basic and instrumental ADLs. Instruction on the use of household items in place of weights as needed.     CARDIAC   EXERCISE    Sets    Reps    Active  Active Assist    Passive  Self ROM    Comments    Shoulder flexion  1  5   [x]                            []                             []                             []                                Shoulder abduction  1  5  [x]                             []                             []                             []                                Scapular elevation  1  5  [x]                             []                              []                             []                                Scapular retraction  1  5  [x]                             []                             []                             []                                Trunk rotation  0 0 []                             []                             []                             []                                Trunk sidebending  0 0 []                             []                              [] []                                          Pain:  Pain Scale 1: Numeric (0 - 10)  Pain Intensity 1: 0  Pain Location 1: Chest  Pain Orientation 1: Anterior  Pain Description 1: Aching  Pain Intervention(s) 1: Medication (see MAR)    Activity Tolerance:   Fair this session, limited by tachypnea    Please refer to the flowsheet for vital signs taken during this treatment.   After treatment:   [] Patient left in no apparent distress sitting up in chair  [x] Patient left in no apparent distress in bed  [x] Call bell left within reach  [x] Nursing notified  [x] Caregiver present (wife)  [] Bed alarm activated    COMMUNICATION/COLLABORATION:   The patients plan of care was discussed with: Physical Therapist and Registered Nurse    Wali Nye OT  Time Calculation: 29 mins

## 2018-07-12 ENCOUNTER — APPOINTMENT (OUTPATIENT)
Dept: GENERAL RADIOLOGY | Age: 68
DRG: 236 | End: 2018-07-12
Attending: NURSE PRACTITIONER
Payer: MEDICARE

## 2018-07-12 LAB
ADMINISTERED INITIALS, ADMINIT: NORMAL
AMYLASE SERPL-CCNC: 26 U/L (ref 25–115)
ANION GAP SERPL CALC-SCNC: 8 MMOL/L (ref 5–15)
BUN SERPL-MCNC: 32 MG/DL (ref 6–20)
BUN/CREAT SERPL: 20 (ref 12–20)
CALCIUM SERPL-MCNC: 8.2 MG/DL (ref 8.5–10.1)
CHLORIDE SERPL-SCNC: 105 MMOL/L (ref 97–108)
CO2 SERPL-SCNC: 24 MMOL/L (ref 21–32)
CREAT SERPL-MCNC: 1.58 MG/DL (ref 0.7–1.3)
D50 ADMINISTERED, D50ADM: 0 ML
D50 ORDER, D50ORD: 0 ML
ERYTHROCYTE [DISTWIDTH] IN BLOOD BY AUTOMATED COUNT: 17.7 % (ref 11.5–14.5)
GLSCOM COMMENTS: NORMAL
GLUCOSE BLD STRIP.AUTO-MCNC: 109 MG/DL (ref 65–100)
GLUCOSE BLD STRIP.AUTO-MCNC: 112 MG/DL (ref 65–100)
GLUCOSE BLD STRIP.AUTO-MCNC: 112 MG/DL (ref 65–100)
GLUCOSE BLD STRIP.AUTO-MCNC: 116 MG/DL (ref 65–100)
GLUCOSE BLD STRIP.AUTO-MCNC: 118 MG/DL (ref 65–100)
GLUCOSE BLD STRIP.AUTO-MCNC: 120 MG/DL (ref 65–100)
GLUCOSE BLD STRIP.AUTO-MCNC: 122 MG/DL (ref 65–100)
GLUCOSE BLD STRIP.AUTO-MCNC: 125 MG/DL (ref 65–100)
GLUCOSE BLD STRIP.AUTO-MCNC: 161 MG/DL (ref 65–100)
GLUCOSE BLD STRIP.AUTO-MCNC: 163 MG/DL (ref 65–100)
GLUCOSE BLD STRIP.AUTO-MCNC: 193 MG/DL (ref 65–100)
GLUCOSE BLD STRIP.AUTO-MCNC: 199 MG/DL (ref 65–100)
GLUCOSE BLD STRIP.AUTO-MCNC: 250 MG/DL (ref 65–100)
GLUCOSE BLD STRIP.AUTO-MCNC: 303 MG/DL (ref 65–100)
GLUCOSE BLD STRIP.AUTO-MCNC: 82 MG/DL (ref 65–100)
GLUCOSE BLD STRIP.AUTO-MCNC: 83 MG/DL (ref 65–100)
GLUCOSE BLD STRIP.AUTO-MCNC: 85 MG/DL (ref 65–100)
GLUCOSE BLD STRIP.AUTO-MCNC: 90 MG/DL (ref 65–100)
GLUCOSE BLD STRIP.AUTO-MCNC: 91 MG/DL (ref 65–100)
GLUCOSE BLD STRIP.AUTO-MCNC: 97 MG/DL (ref 65–100)
GLUCOSE BLD STRIP.AUTO-MCNC: 97 MG/DL (ref 65–100)
GLUCOSE SERPL-MCNC: 107 MG/DL (ref 65–100)
GLUCOSE, GLC: 109 MG/DL
GLUCOSE, GLC: 112 MG/DL
GLUCOSE, GLC: 112 MG/DL
GLUCOSE, GLC: 116 MG/DL
GLUCOSE, GLC: 118 MG/DL
GLUCOSE, GLC: 120 MG/DL
GLUCOSE, GLC: 122 MG/DL
GLUCOSE, GLC: 125 MG/DL
GLUCOSE, GLC: 168 MG/DL
GLUCOSE, GLC: 193 MG/DL
GLUCOSE, GLC: 199 MG/DL
GLUCOSE, GLC: 250 MG/DL
GLUCOSE, GLC: 303 MG/DL
GLUCOSE, GLC: 82 MG/DL
GLUCOSE, GLC: 83 MG/DL
GLUCOSE, GLC: 85 MG/DL
GLUCOSE, GLC: 90 MG/DL
GLUCOSE, GLC: 91 MG/DL
GLUCOSE, GLC: 97 MG/DL
HCT VFR BLD AUTO: 23.1 % (ref 36.6–50.3)
HGB BLD-MCNC: 7 G/DL (ref 12.1–17)
HIGH TARGET, HITG: 140 MG/DL
INSULIN ADMINSTERED, INSADM: 0 UNITS/HOUR
INSULIN ADMINSTERED, INSADM: 0.5 UNITS/HOUR
INSULIN ADMINSTERED, INSADM: 0.7 UNITS/HOUR
INSULIN ADMINSTERED, INSADM: 0.8 UNITS/HOUR
INSULIN ADMINSTERED, INSADM: 1 UNITS/HOUR
INSULIN ADMINSTERED, INSADM: 1.1 UNITS/HOUR
INSULIN ADMINSTERED, INSADM: 1.1 UNITS/HOUR
INSULIN ADMINSTERED, INSADM: 1.2 UNITS/HOUR
INSULIN ADMINSTERED, INSADM: 1.2 UNITS/HOUR
INSULIN ADMINSTERED, INSADM: 1.3 UNITS/HOUR
INSULIN ADMINSTERED, INSADM: 1.3 UNITS/HOUR
INSULIN ADMINSTERED, INSADM: 1.4 UNITS/HOUR
INSULIN ADMINSTERED, INSADM: 1.4 UNITS/HOUR
INSULIN ADMINSTERED, INSADM: 10.2 UNITS/HOUR
INSULIN ADMINSTERED, INSADM: 2.2 UNITS/HOUR
INSULIN ADMINSTERED, INSADM: 2.4 UNITS/HOUR
INSULIN ADMINSTERED, INSADM: 5.6 UNITS/HOUR
INSULIN ADMINSTERED, INSADM: 5.8 UNITS/HOUR
INSULIN ADMINSTERED, INSADM: 6.1 UNITS/HOUR
INSULIN ORDER, INSORD: 0.3 UNITS/HOUR
INSULIN ORDER, INSORD: 0.5 UNITS/HOUR
INSULIN ORDER, INSORD: 0.7 UNITS/HOUR
INSULIN ORDER, INSORD: 0.8 UNITS/HOUR
INSULIN ORDER, INSORD: 1 UNITS/HOUR
INSULIN ORDER, INSORD: 1.1 UNITS/HOUR
INSULIN ORDER, INSORD: 1.1 UNITS/HOUR
INSULIN ORDER, INSORD: 1.2 UNITS/HOUR
INSULIN ORDER, INSORD: 1.2 UNITS/HOUR
INSULIN ORDER, INSORD: 1.3 UNITS/HOUR
INSULIN ORDER, INSORD: 1.3 UNITS/HOUR
INSULIN ORDER, INSORD: 1.4 UNITS/HOUR
INSULIN ORDER, INSORD: 1.4 UNITS/HOUR
INSULIN ORDER, INSORD: 10.2 UNITS/HOUR
INSULIN ORDER, INSORD: 2.2 UNITS/HOUR
INSULIN ORDER, INSORD: 2.4 UNITS/HOUR
INSULIN ORDER, INSORD: 5.6 UNITS/HOUR
INSULIN ORDER, INSORD: 5.8 UNITS/HOUR
INSULIN ORDER, INSORD: 6.1 UNITS/HOUR
LIPASE SERPL-CCNC: 109 U/L (ref 73–393)
LOW TARGET, LOT: 100 MG/DL
MAGNESIUM SERPL-MCNC: 2.5 MG/DL (ref 1.6–2.4)
MCH RBC QN AUTO: 23.7 PG (ref 26–34)
MCHC RBC AUTO-ENTMCNC: 30.3 G/DL (ref 30–36.5)
MCV RBC AUTO: 78.3 FL (ref 80–99)
MINUTES UNTIL NEXT BG, NBG: 120 MIN
MINUTES UNTIL NEXT BG, NBG: 120 MIN
MINUTES UNTIL NEXT BG, NBG: 60 MIN
MULTIPLIER, MUL: 0.01
MULTIPLIER, MUL: 0.02
MULTIPLIER, MUL: 0.03
MULTIPLIER, MUL: 0.04
NRBC # BLD: 0 K/UL (ref 0–0.01)
NRBC BLD-RTO: 0 PER 100 WBC
ORDER INITIALS, ORDINIT: NORMAL
PLATELET # BLD AUTO: 182 K/UL (ref 150–400)
PMV BLD AUTO: 11.7 FL (ref 8.9–12.9)
POTASSIUM SERPL-SCNC: 3.7 MMOL/L (ref 3.5–5.1)
RBC # BLD AUTO: 2.95 M/UL (ref 4.1–5.7)
SERVICE CMNT-IMP: ABNORMAL
SERVICE CMNT-IMP: NORMAL
SODIUM SERPL-SCNC: 137 MMOL/L (ref 136–145)
WBC # BLD AUTO: 12.9 K/UL (ref 4.1–11.1)

## 2018-07-12 PROCEDURE — 77030029684 HC NEB SM VOL KT MONA -A

## 2018-07-12 PROCEDURE — 74011250636 HC RX REV CODE- 250/636: Performed by: NURSE PRACTITIONER

## 2018-07-12 PROCEDURE — 85027 COMPLETE CBC AUTOMATED: CPT | Performed by: NURSE PRACTITIONER

## 2018-07-12 PROCEDURE — 36415 COLL VENOUS BLD VENIPUNCTURE: CPT | Performed by: NURSE PRACTITIONER

## 2018-07-12 PROCEDURE — 74011250637 HC RX REV CODE- 250/637: Performed by: THORACIC SURGERY (CARDIOTHORACIC VASCULAR SURGERY)

## 2018-07-12 PROCEDURE — 74018 RADEX ABDOMEN 1 VIEW: CPT

## 2018-07-12 PROCEDURE — 65660000000 HC RM CCU STEPDOWN

## 2018-07-12 PROCEDURE — 97535 SELF CARE MNGMENT TRAINING: CPT

## 2018-07-12 PROCEDURE — 82150 ASSAY OF AMYLASE: CPT | Performed by: NURSE PRACTITIONER

## 2018-07-12 PROCEDURE — 71045 X-RAY EXAM CHEST 1 VIEW: CPT

## 2018-07-12 PROCEDURE — 94640 AIRWAY INHALATION TREATMENT: CPT

## 2018-07-12 PROCEDURE — 74011636637 HC RX REV CODE- 636/637: Performed by: THORACIC SURGERY (CARDIOTHORACIC VASCULAR SURGERY)

## 2018-07-12 PROCEDURE — 77010033678 HC OXYGEN DAILY

## 2018-07-12 PROCEDURE — 83735 ASSAY OF MAGNESIUM: CPT | Performed by: NURSE PRACTITIONER

## 2018-07-12 PROCEDURE — 80048 BASIC METABOLIC PNL TOTAL CA: CPT | Performed by: NURSE PRACTITIONER

## 2018-07-12 PROCEDURE — 74011000250 HC RX REV CODE- 250: Performed by: NURSE PRACTITIONER

## 2018-07-12 PROCEDURE — 74011250637 HC RX REV CODE- 250/637: Performed by: NURSE PRACTITIONER

## 2018-07-12 PROCEDURE — 74011000258 HC RX REV CODE- 258: Performed by: THORACIC SURGERY (CARDIOTHORACIC VASCULAR SURGERY)

## 2018-07-12 PROCEDURE — 97116 GAIT TRAINING THERAPY: CPT

## 2018-07-12 PROCEDURE — 82962 GLUCOSE BLOOD TEST: CPT

## 2018-07-12 PROCEDURE — 97110 THERAPEUTIC EXERCISES: CPT

## 2018-07-12 PROCEDURE — 83690 ASSAY OF LIPASE: CPT | Performed by: NURSE PRACTITIONER

## 2018-07-12 PROCEDURE — 94760 N-INVAS EAR/PLS OXIMETRY 1: CPT

## 2018-07-12 RX ORDER — FUROSEMIDE 10 MG/ML
40 INJECTION INTRAMUSCULAR; INTRAVENOUS ONCE
Status: COMPLETED | OUTPATIENT
Start: 2018-07-12 | End: 2018-07-12

## 2018-07-12 RX ORDER — ALBUTEROL SULFATE 0.83 MG/ML
2.5 SOLUTION RESPIRATORY (INHALATION)
Status: DISCONTINUED | OUTPATIENT
Start: 2018-07-12 | End: 2018-07-14

## 2018-07-12 RX ORDER — ARFORMOTEROL TARTRATE 15 UG/2ML
15 SOLUTION RESPIRATORY (INHALATION)
Status: DISCONTINUED | OUTPATIENT
Start: 2018-07-12 | End: 2018-07-18 | Stop reason: HOSPADM

## 2018-07-12 RX ORDER — METOCLOPRAMIDE HYDROCHLORIDE 5 MG/ML
5 INJECTION INTRAMUSCULAR; INTRAVENOUS EVERY 6 HOURS
Status: COMPLETED | OUTPATIENT
Start: 2018-07-12 | End: 2018-07-13

## 2018-07-12 RX ORDER — BUDESONIDE 0.5 MG/2ML
500 INHALANT ORAL
Status: DISCONTINUED | OUTPATIENT
Start: 2018-07-12 | End: 2018-07-18 | Stop reason: HOSPADM

## 2018-07-12 RX ORDER — INSULIN GLARGINE 100 [IU]/ML
26 INJECTION, SOLUTION SUBCUTANEOUS ONCE
Status: COMPLETED | OUTPATIENT
Start: 2018-07-12 | End: 2018-07-12

## 2018-07-12 RX ORDER — ACETAMINOPHEN 10 MG/ML
1000 INJECTION, SOLUTION INTRAVENOUS
Status: COMPLETED | OUTPATIENT
Start: 2018-07-12 | End: 2018-07-17

## 2018-07-12 RX ORDER — ENOXAPARIN SODIUM 100 MG/ML
40 INJECTION SUBCUTANEOUS EVERY 24 HOURS
Status: DISCONTINUED | OUTPATIENT
Start: 2018-07-12 | End: 2018-07-18 | Stop reason: HOSPADM

## 2018-07-12 RX ADMIN — Medication 400 MG: at 19:06

## 2018-07-12 RX ADMIN — SENNOSIDES AND DOCUSATE SODIUM 1 TABLET: 8.6; 5 TABLET ORAL at 19:06

## 2018-07-12 RX ADMIN — POLYETHYLENE GLYCOL 3350 17 G: 17 POWDER, FOR SOLUTION ORAL at 08:31

## 2018-07-12 RX ADMIN — BUDESONIDE 500 MCG: 0.5 INHALANT RESPIRATORY (INHALATION) at 14:07

## 2018-07-12 RX ADMIN — IRON SUCROSE 100 MG: 20 INJECTION, SOLUTION INTRAVENOUS at 08:39

## 2018-07-12 RX ADMIN — FUROSEMIDE 40 MG: 10 INJECTION, SOLUTION INTRAMUSCULAR; INTRAVENOUS at 09:47

## 2018-07-12 RX ADMIN — CHLORHEXIDINE GLUCONATE 10 ML: 1.2 RINSE ORAL at 08:32

## 2018-07-12 RX ADMIN — METOPROLOL TARTRATE 50 MG: 50 TABLET ORAL at 21:28

## 2018-07-12 RX ADMIN — SODIUM CHLORIDE 2.2 UNITS/HR: 900 INJECTION, SOLUTION INTRAVENOUS at 16:20

## 2018-07-12 RX ADMIN — Medication 10 ML: at 13:08

## 2018-07-12 RX ADMIN — METOPROLOL TARTRATE 50 MG: 50 TABLET ORAL at 08:32

## 2018-07-12 RX ADMIN — Medication 10 ML: at 21:29

## 2018-07-12 RX ADMIN — ALBUTEROL SULFATE 2.5 MG: 2.5 SOLUTION RESPIRATORY (INHALATION) at 19:45

## 2018-07-12 RX ADMIN — OXYCODONE HYDROCHLORIDE 5 MG: 5 TABLET ORAL at 05:42

## 2018-07-12 RX ADMIN — BUDESONIDE 500 MCG: 0.5 INHALANT RESPIRATORY (INHALATION) at 19:45

## 2018-07-12 RX ADMIN — ATORVASTATIN CALCIUM 20 MG: 20 TABLET, FILM COATED ORAL at 21:28

## 2018-07-12 RX ADMIN — FOLIC ACID 1 MG: 1 TABLET ORAL at 08:32

## 2018-07-12 RX ADMIN — MUPIROCIN: 20 OINTMENT TOPICAL at 19:06

## 2018-07-12 RX ADMIN — ARFORMOTEROL TARTRATE 15 MCG: 15 SOLUTION RESPIRATORY (INHALATION) at 14:07

## 2018-07-12 RX ADMIN — METOCLOPRAMIDE 5 MG: 5 INJECTION, SOLUTION INTRAMUSCULAR; INTRAVENOUS at 09:46

## 2018-07-12 RX ADMIN — INSULIN GLARGINE 26 UNITS: 100 INJECTION, SOLUTION SUBCUTANEOUS at 20:21

## 2018-07-12 RX ADMIN — Medication 10 ML: at 05:40

## 2018-07-12 RX ADMIN — SENNOSIDES AND DOCUSATE SODIUM 1 TABLET: 8.6; 5 TABLET ORAL at 08:31

## 2018-07-12 RX ADMIN — MUPIROCIN: 20 OINTMENT TOPICAL at 08:32

## 2018-07-12 RX ADMIN — Medication 500 MCG: at 08:31

## 2018-07-12 RX ADMIN — OXYCODONE HYDROCHLORIDE AND ACETAMINOPHEN 500 MG: 500 TABLET ORAL at 08:32

## 2018-07-12 RX ADMIN — METOCLOPRAMIDE 5 MG: 5 INJECTION, SOLUTION INTRAMUSCULAR; INTRAVENOUS at 21:29

## 2018-07-12 RX ADMIN — CHLORHEXIDINE GLUCONATE 10 ML: 1.2 RINSE ORAL at 19:06

## 2018-07-12 RX ADMIN — FAMOTIDINE 20 MG: 20 TABLET ORAL at 08:32

## 2018-07-12 RX ADMIN — Medication 400 MG: at 08:38

## 2018-07-12 RX ADMIN — TAMSULOSIN HYDROCHLORIDE 0.4 MG: 0.4 CAPSULE ORAL at 08:31

## 2018-07-12 RX ADMIN — ASPIRIN 81 MG 81 MG: 81 TABLET ORAL at 08:32

## 2018-07-12 RX ADMIN — SODIUM CHLORIDE 10.2 UNITS/HR: 900 INJECTION, SOLUTION INTRAVENOUS at 14:11

## 2018-07-12 RX ADMIN — METOCLOPRAMIDE 5 MG: 5 INJECTION, SOLUTION INTRAMUSCULAR; INTRAVENOUS at 15:06

## 2018-07-12 RX ADMIN — ENOXAPARIN SODIUM 40 MG: 100 INJECTION SUBCUTANEOUS at 15:05

## 2018-07-12 NOTE — DIABETES MGMT
DTC Cardiac Surgery Progress Note     Recommendations/ Comments:      Pt arrived to CVICU at 1143 on 7-9-18. Consider continuing insulin gtt for at least 48hrs post-op and eating 50% solid foods then,  1) transition off gtt per [employer] Protocol   2) continue accu-checks and humalog correctional insulin ac & hs   3) ADA/AHA diet as diet advanced  4) resume glipizide once eating and off of drip     Noted diet changed to clear liquid due to ileus. BG increasing, ? drinking juice now that is on clear liquid diet    If appropriate, consider adding no concentrated sweets to current diet    Completed 48hr post-op time frame on 7-11-16 at 1143    Currently on insulin gtt. Current drip rate is 10.2 units per hour and blood sugar at 1409 was 303 mg/dl. Chart reviewed on [facility]. Patient is 78 y.o. male s/p CABG  POD2. Pt with a known history of diabetes on Metformin 1000 mg BID, Glipizide 10 mg BID and Januvia 50 mg at home. A1c:   Lab Results   Component Value Date/Time    Hemoglobin A1c 7.4 (H) 06/22/2018 01:53 PM         Recent Glucose Results:   Lab Results   Component Value Date/Time     (H) 07/12/2018 03:46 AM    GLUCPOC 303 (H) 07/12/2018 02:09 PM    GLUCPOC 250 (H) 07/12/2018 01:04 PM    GLUCPOC 163 (H) 07/12/2018 11:58 AM        Lab Results   Component Value Date/Time    Creatinine 1.58 (H) 07/12/2018 03:46 AM     Estimated Creatinine Clearance: 40.1 mL/min (based on Cr of 1.58). Active Orders   Diet    DIET CLEAR LIQUID        PO intake:   Patient Vitals for the past 72 hrs:   % Diet Eaten   07/12/18 0800 10 %   07/11/18 0933 10 %       Will continue to follow as needed. Thank you.   Nacho Pope RD

## 2018-07-12 NOTE — PROGRESS NOTES
0730 Bedside shift change report given to Chad Beck (oncoming nurse) by Inder Tang RN (offgoing nurse). Report included the following information SBAR, Kardex, ED Summary, MAR, Accordion, Recent Results and Med Rec Status. 1930 Bedside shift change report given to Charmaine (oncoming nurse) by Brandon Ward RN (offgoing nurse). Report included the following information SBAR, Kardex, OR Summary, Recent Results, Med Rec Status and Cardiac Rhythm nsr.

## 2018-07-12 NOTE — PROGRESS NOTES
Problem: Self Care Deficits Care Plan (Adult)  Goal: *Acute Goals and Plan of Care (Insert Text)  Occupational Therapy Goals  Initiated 7/10/2018  1. Patient will perform ADLs standing 5 mins without fatigue or LOB with supervision/set-up within 7 day(s). 2.  Patient will perform lower body ADLs with minimal assistance/contact guard assist and AE PRN within 7 day(s). 3.  Patient will perform upper body dressing with modified independence within 7 day(s). 4.  Patient will perform toilet transfers with supervision/set-up within 7 day(s). 5.  Patient will perform all aspects of toileting with supervision within 7 day(s). 6.  Patient will participate in cardiac/sternal upper extremity therapeutic exercise/activities to increase independence with ADLs with supervision/set-up for 5 minutes within 7 day(s). Occupational Therapy TREATMENT  Patient: Sarbjit Timmons (69 y.o. male)  Date: 7/12/2018  Diagnosis: CAD   CAD (coronary artery disease) S/P CABG x 3  Procedure(s) (LRB):  CORONARY ARTERY BYPASS GRAFT x 3 with Left Internal Mammary Artery and Right Greater Saphenous Vein - Transesphageal EchoCardiogram and EpiAortic Ultrsound performed by Dr. Geovany Solorzano (N/A) 3 Days Post-Op  Precautions: Fall, Sternal  Chart, occupational therapy assessment, plan of care, and goals were reviewed. ASSESSMENT:  Patient received in supine, agreeable to therapy co-treatment with PT to maximize safety & functional independence. Educated on LRT, completing bed mobility with cues for maintaining sternal precautions (see PT note for assist level). Patient able to stand with CGA, however requiring initial Min A for standing balance d/t anterior weight shifting. Transferring to the chair with CGA, noted SOB with rattling with all mobility, MAX, continuous verbal/visual cues for PLB & pacing, requiring extended rest breaks (~2-3 min) between activities with O2 sats stable (92-98% on RA).  Able to complete tailor sit from a low chair to doff socks, however requiring rest breaks in between d/t SOB with accessory muscle activation. D/t decreased cardiopulmonary/activity tolerance and assist for functional activities and mobility, recommend patient d/c to inpatient rehab when medically stable. Progression toward goals:  []       Improving appropriately and progressing toward goals  [x]       Improving slowly and progressing toward goals  []       Not making progress toward goals and plan of care will be adjusted     PLAN:  Patient continues to benefit from skilled intervention to address the above impairments. Continue treatment per established plan of care. Discharge Recommendations:  Inpatient Rehab  Further Equipment Recommendations for Discharge:  TBD by rehab     SUBJECTIVE:   Patient stated A little bit.  re: when asked if feeling SOB after bed mobility    OBJECTIVE DATA SUMMARY:   Cognitive/Behavioral Status:  Neurologic State: Alert  Orientation Level: Oriented X4  Cognition: Appropriate for age attention/concentration; Follows commands  Perception: Appears intact  Perseveration: No perseveration noted  Safety/Judgement: Awareness of environment; Fall prevention    Functional Mobility and Transfers for ADLs:  Bed Mobility:  Supine to Sit:  (cues provided for sternal precautions, see PT note for A)    Transfers:  Sit to Stand: Minimum assistance;Contact guard assistance (initial Min A for anterior weight shift with stand)     Bed to Chair: Contact guard assistance    Balance:  Sitting: Intact  Standing: Impaired  Standing - Static: Good  Standing - Dynamic : Fair    ADL Intervention:  Grooming  Grooming Assistance:  (completed earlier this morning)    Lower Body Dressing Assistance  Dressing Assistance: Minimum assistance  Socks: Minimum assistance; Compensatory technique training (for distal reach to BLE from low chair)  Leg Crossed Method Used: Yes  Position Performed: Seated in chair (low chair)  Cues: Physical assistance;Verbal cues provided;Visual cues provided    Cognitive Retraining  Safety/Judgement: Awareness of environment; Fall prevention    Therapeutic Exercises:   - Sit<>stand with CGA, initial Min A for anterior weight shifting with stand, transferred to the chair with CGA requiring rest breaks in between all activity    Pain:  Pain Scale 1: Numeric (0 - 10)  Pain Intensity 1: 0     Activity Tolerance:   Fair, limited by SOB/rattling requiring increased time to recover in between activities    Please refer to the flowsheet for vital signs taken during this treatment.   After treatment:   [x] Patient left in no apparent distress sitting up in chair, PT still present  [] Patient left in no apparent distress in bed  [x] Call bell left within reach  [x] Nursing notified  [x] Caregiver present  [] Bed alarm activated    COMMUNICATION/COLLABORATION:   The patients plan of care was discussed with: Physical Therapist, Registered Nurse and NP    Olegario Hanna OT  Time Calculation: 19 mins

## 2018-07-12 NOTE — PROGRESS NOTES
2000: Bedside and Verbal shift change report given to Antonio Arriaga RN (oncoming nurse) by LORI MEDINA (offgoing nurse). Report included the following information SBAR, Kardex, Procedure Summary, Intake/Output, MAR, Recent Results and Cardiac Rhythm NSR.     0000: Bedside and Verbal shift change report given to LORI Braun (oncoming nurse) by Antonio Arriaga RN (offgoing nurse). Report included the following information SBAR, Kardex, Procedure Summary, Intake/Output, MAR, Recent Results and Cardiac Rhythm NSR.

## 2018-07-12 NOTE — PROGRESS NOTES
Problem: Mobility Impaired (Adult and Pediatric)  Goal: *Acute Goals and Plan of Care (Insert Text)  Physical Therapy Goals    Initiated 07/10/18   1. Patient will move from supine to sit and sit to supine , scoot up and down and roll side to side in bed with modified independence within 5 day(s). 2.  Patient will transfer from bed to chair and chair to bed with modified independence using the least restrictive device within 5 day(s). 3.  Patient will perform sit to stand with modified independence within 5 day(s). 4.  Patient will ambulate with modified independence for 300 feet with the least restrictive device within 5 day(s). 5.  Patient will ascend/descend 12 stairs with one handrail(s) with modified independence within 5 day(s). 6.  Patient will perform cardiac exercises per protocol with independence within 5 days. 7.  Patient will verbally and functionally recall 3/3 sternal precautions within 5 days. physical Therapy TREATMENT  Patient: Akiko Manning (69 y.o. male)  Date: 7/12/2018  Diagnosis: CAD   CAD (coronary artery disease) S/P CABG x 3  Procedure(s) (LRB):  CORONARY ARTERY BYPASS GRAFT x 3 with Left Internal Mammary Artery and Right Greater Saphenous Vein - Transesphageal EchoCardiogram and EpiAortic Ultrsound performed by Dr. Zena Hitchcock (N/A) 3 Days Post-Op  Precautions: Fall, Sternal  Chart, physical therapy assessment, plan of care and goals were reviewed. ASSESSMENT:  Chart reviewed, RN cleared patient for mobility, and patient received in bed with daughter and wife present. Patient progressed with all mobility today with no buckling or LOB observed. Patient continues to require MIn A x 1 for bed mobility and close CGA/Min A for OOB mobility. RR increased during gait training 20ft, pulse ox did not get a reading. In chair PT educated patient on rehab, mobility expectations, and exercises/HEP to complete. Patient performed all ther ex.  Patient ended session in chair with all needs placed within reach and RN notified of patient's status. Discharge rec - IPR as patient has 14 steps at home and continues to be unsteady ambulating household distances and performing bed mobility when PTA patient was independent with a moderately active lifestyle. Progression toward goals:  [x]    Improving appropriately and progressing toward goals  []    Improving slowly and progressing toward goals  []    Not making progress toward goals and plan of care will be adjusted     PLAN:  Patient continues to benefit from skilled intervention to address the above impairments. Continue treatment per established plan of care. Discharge Recommendations:  Inpatient Rehab  Further Equipment Recommendations for Discharge:  TBD     SUBJECTIVE:   Patient stated No pain but passing lots of air.     OBJECTIVE DATA SUMMARY:   Critical Behavior:  Neurologic State: Alert  Orientation Level: Oriented X4  Cognition: Appropriate for age attention/concentration, Follows commands  Safety/Judgement: Awareness of environment, Fall prevention  Functional Mobility Training:  Bed Mobility:     Supine to Sit: Minimum assistance              Transfers:  Sit to Stand: Contact guard assistance  Stand to Sit: Contact guard assistance        Bed to Chair: Contact guard assistance                    Balance:  Sitting: Intact  Standing: Impaired  Standing - Static: Good  Standing - Dynamic : Fair  Ambulation/Gait Training:  Distance (ft): 20 Feet (ft)  Assistive Device: Gait belt  Ambulation - Level of Assistance: Minimal assistance        Gait Abnormalities: Decreased step clearance;Trunk sway increased; Shuffling gait; Path deviations        Base of Support: Widened     Speed/Bette: Shuffled; Slow  Step Length: Right shortened;Left shortened                    Stairs:              Neuro Re-Education:    Therapeutic Exercises:   Hip flex, knee ext, IS x 10 each, rec 10x/hr  Pain:  Pain Scale 1: Numeric (0 - 10)  Pain Intensity 1: 0 Activity Tolerance:   Good, improved from yesterday. RN and NP notified about abnormal breath sounds  Please refer to the flowsheet for vital signs taken during this treatment.   After treatment:   [x]    Patient left in no apparent distress sitting up in chair  []    Patient left in no apparent distress in bed  [x]    Call bell left within reach  [x]    Nursing notified  [x]    Caregiver present  []    Bed alarm activated    COMMUNICATION/COLLABORATION:   The patients plan of care was discussed with: Registered Nurse    Amina Amezcua PT, DPT   Time Calculation: 31 mins

## 2018-07-12 NOTE — PROGRESS NOTES
2330: Bedside and Verbal shift change report given to LORI Braun (oncoming nurse) by Florence Bray RN (offgoing nurse). Report included the following information SBAR, Kardex, Intake/Output, MAR and Recent Results. 0700: Patient had uneventful night. Bathed with CHG. Gown and linen changed. 0730: Bedside and Verbal shift change report given to Kodak Epstein RN (oncoming nurse) by Tanya Cole RN (offgoing nurse). Report included the following information SBAR, Kardex, Intake/Output, MAR and Recent Results.

## 2018-07-12 NOTE — PROGRESS NOTES
CM consulted PT/OT and NP regarding patient's disposition. It is recommended that patient discharge to inpatient rehab. CM met with patient and wife to obtain choice. Patient and wife have elected [address] [facility] CM to monitor.      Keshia Mobley MS

## 2018-07-12 NOTE — CARDIO/PULMONARY
Cardiac Rehab: CABG education folder at bedside. Met with Ayala Miller  and his familyto review cardiac surgery post discharge instructions and to discuss participation in the Cardiac Rehab Program.     Educated using teach back method. Reviewed the use of bear for sternal support, daily weight and temperature monitoring, showering restrictions, signs and symptoms of infection at surgery sites, daily walking and arm exercises, and use of incentive spirometer. Ayala Miller was able to demonstrate proper use of incentive spirometer, achieving 500 ml. Discussed Heart Healthy/Low Sodium (2000 mg.) diet. Placed a red reminder bracelet. Discussed purpose of bracelet, duration of wear, and when to call surgeons office. Discussed Cardiac Rehab Program format, benefits, and encouraged participation.  Ayala Miller will likely need IPR so the contact information for the [facility] at [facility] will be placed on his AVS.    Will continue to follow for educational needs and enrollment in the Cardiac Rehab Program. Viky Mtz RN

## 2018-07-12 NOTE — PROGRESS NOTES
CSS Progress Note    Admit Date: 2018  POD:  3 Day Post-Op    Procedure:  Procedure(s):  CORONARY ARTERY BYPASS GRAFT x 3 with Left Internal Mammary Artery and Right Greater Saphenous Vein - Transesphageal EchoCardiogram and EpiAortic Ultrsound performed by Dr. Mary Martel        Subjective:   Pt seen with Dr. Brigido Colon. On insulin. Afebrile, on RA. Resting in bed, some SOB Overnight. Objective:   Vitals:  Blood pressure 130/52, pulse 89, temperature 98.3 °F (36.8 °C), resp. rate 20, height 5' 3\" (1.6 m), weight 151 lb 3.8 oz (68.6 kg), SpO2 100 %. Temp (24hrs), Av.3 °F (36.8 °C), Min:97.7 °F (36.5 °C), Max:98.9 °F (37.2 °C)    EKG/Rhythm:  SR     Oxygen Therapy:  Oxygen Therapy  O2 Sat (%): 100 % (18 0745)  Pulse via Oximetry: 80 beats per minute (18 1000)  O2 Device: Nasal cannula (18 0800)  O2 Flow Rate (L/min): 2 l/min (18 0800)  FIO2 (%): 40 % (18 1214)    CXR:   CXR Results  (Last 48 hours)               18 0436  XR CHEST PORT Final result    Impression:  IMPRESSION: No change in patchy left basilar airspace opacification. Interval   removal of vascular sheath and pulmonary arterial catheter. Narrative:  EXAM:  XR CHEST PORT       INDICATION:  postop heart       COMPARISON:         FINDINGS: A portable AP radiograph of the chest was obtained at 0353 hours. The   patient is status post median sternotomy. The vascular sheath and pulmonary   arterial catheter have been removed in the interval. The patient is on a cardiac   monitor. There is patchy opacification in the left lower lobe without change. The cardiac and mediastinal contours and pulmonary vascularity are normal.  The   bones and soft tissues are grossly within normal limits.                     Admission Weight: Last Weight   Weight: 143 lb (64.9 kg) Weight: 151 lb 3.8 oz (68.6 kg)     Intake / Output / Drain:  Current Shift:  0701 -  1900  In: 65.6 [P.O.:60; I.V.:5.6]  Out: 500 [Urine:500]  Last 24 hrs.:     Intake/Output Summary (Last 24 hours) at 18 1032  Last data filed at 18 0830   Gross per 24 hour   Intake            458.7 ml   Output             1625 ml   Net          -1166.3 ml       EXAM:  General:  No obvious distress                                                                                        Lungs:   Clear to auscultation bilaterally, diminished in bases. Incision:  Drs C/D/I   Heart:  Regular rate and rhythm, S1, S2 normal, +8/3 murmur, click, rub or gallop. Abdomen:   Firm, distended. Bowel sounds hypoactive. No masses,  No organomegaly. Extremities:  No edema. PPP. Neurologic:  Gross motor and sensory apparatus intact. Labs:   Recent Labs      18   0954   18   0346   18   1159   WBC   --    --   12.9*   < >  19.3*   HGB   --    --   7.0*   < >  7.4*   HCT   --    --   23.1*   < >  24.4*   PLT   --    --   182   < >  214   NA   --    --   137   < >  143   K   --    --   3.7   < >  4.5   BUN   --    --   32*   < >  13   CREA   --    --   1.58*   < >  1.40*   GLU   --    --   107*   < >  97   GLUCPOC  112*   < >   --    < >   --    INR   --    --    --    --   1.3*    < > = values in this interval not displayed. Assessment:     Principal Problem:    S/P CABG x 3 (2018)      Overview: Coronary Artery Bypass Grafting x 3, LIMA to LAD, RSVG to OM, RSVG to RCA      Right GSV EVH    Active Problems:    CAD, multiple vessel (2018)      CAD (coronary artery disease) (2018)         Plan/Recommendations/Medical Decision Makin. S/p CABG x 3:  On asa, statin, and BB. 2. HTN: Cont BB. PRN hydralazine, Monitor   3. Atelectasis: wean oxygen for 02 sat > 92%. Increase IS and activity as tolerated. Lasix 40 mg IV now. 4. DM: Hgba1c 7.4. Cont insulin gtt per protocol. DTC consult. Will need to resume home meds as appropriate.    5. Acute anemia s/t post op blood loss & iron deficiency/B12 def: Recently had EGD/colonscopy after discovery of anemia during hospitalization. Showed gastritis. Cont pepcid. Cont IV iron, PO U51, folic acid and Vit C(caution d/t renal insuff). Monitor H/H and CT output. 6. Hyperlipidemia: cont statin. 7. Renal insufficiency: Creat back down. Monitor. Avoid nephrotoxic meds. Known recently to have hyperkalemia on outpt labs. K stable today. 8. Postop urinary retention:  Vasquez replaced 7/11 in evening. On flomax. Keep today, void trial Friday. 9. Distended abd:  KUB now. Start reglan. On pericolace, miralax. MOBILIZE. 10.  Dispo: PT/OT. Likely need inpt rehab. Discuss w/ CM. Update:  KUB shows ileus -- will change diet to clear liquids. Very active BS. Cont reglan, bowel regimen. Check amylase/lipase. Discussed w/ family. Update:  RN called reporting SOB and audible wheezing. PRN albuterol neb given. Pt resting on exam, visualized chest retracting while breathing. Some wheezing bilat Upper lobes. Will schedule albuterol, steroid nebs. Place on DVT px lovenox 40 mg daily. PT has been more fatigued today, but reports not sleeping last night. Per RN, did not desat, was placed back on 3 LNC mostly for comfort, 02 sats were 92-93%  On RA.       Signed By: Bismark Sweet NP

## 2018-07-12 NOTE — PROGRESS NOTES
2000: Bedside shift change report given to [employer] [place] (oncoming nurse) by Yola Springer (offgoing nurse). Report included the following information SBAR, Intake/Output, MAR, Accordion, Recent Results, Med Rec Status and Cardiac Rhythm NSR.     2215: Insulin drip stopped as ordered; will cover ACHS    0335: /55, recheck 205/66. PRN hydralazine given    0430: BP down to 094H systolic. Patient ambulated to bedside commode with assist x2; had large loose BM (not formed); donnelly care provided. Patient visibly DONALDSON, but SPO2 maintained >90% on 2L NC. Patient also tremorous after sitting up for about 10 minutes, states he feels really weak and needs to return to bed. Returned to bed; patient no longer exhibiting dyspnea after 5 minutes of rest.     0700: Patient declines to get up at this time, states he feels very tired this morning and wants to sleep.     0755: Bedside shift change report given to Stephanie Guevara (oncoming nurse) by [employer] (offgoing nurse). Report included the following information SBAR, Intake/Output, MAR, Accordion, Recent Results, Med Rec Status and Cardiac Rhythm NSR.

## 2018-07-13 ENCOUNTER — APPOINTMENT (OUTPATIENT)
Dept: GENERAL RADIOLOGY | Age: 68
DRG: 236 | End: 2018-07-13
Attending: NURSE PRACTITIONER
Payer: MEDICARE

## 2018-07-13 LAB
ABO + RH BLD: NORMAL
ALBUMIN SERPL-MCNC: 2.6 G/DL (ref 3.5–5)
ALBUMIN/GLOB SERPL: 0.7 {RATIO} (ref 1.1–2.2)
ALP SERPL-CCNC: 80 U/L (ref 45–117)
ALT SERPL-CCNC: 31 U/L (ref 12–78)
ANION GAP SERPL CALC-SCNC: 10 MMOL/L (ref 5–15)
AST SERPL-CCNC: 55 U/L (ref 15–37)
BILIRUB SERPL-MCNC: 0.5 MG/DL (ref 0.2–1)
BLD PROD TYP BPU: NORMAL
BLD PROD TYP BPU: NORMAL
BLOOD GROUP ANTIBODIES SERPL: NORMAL
BPU ID: NORMAL
BPU ID: NORMAL
BUN SERPL-MCNC: 32 MG/DL (ref 6–20)
BUN/CREAT SERPL: 26 (ref 12–20)
CALCIUM SERPL-MCNC: 8.3 MG/DL (ref 8.5–10.1)
CHLORIDE SERPL-SCNC: 107 MMOL/L (ref 97–108)
CO2 SERPL-SCNC: 22 MMOL/L (ref 21–32)
CREAT SERPL-MCNC: 1.22 MG/DL (ref 0.7–1.3)
CROSSMATCH RESULT,%XM: NORMAL
CROSSMATCH RESULT,%XM: NORMAL
ERYTHROCYTE [DISTWIDTH] IN BLOOD BY AUTOMATED COUNT: 17.5 % (ref 11.5–14.5)
GLOBULIN SER CALC-MCNC: 3.5 G/DL (ref 2–4)
GLUCOSE BLD STRIP.AUTO-MCNC: 118 MG/DL (ref 65–100)
GLUCOSE BLD STRIP.AUTO-MCNC: 214 MG/DL (ref 65–100)
GLUCOSE BLD STRIP.AUTO-MCNC: 239 MG/DL (ref 65–100)
GLUCOSE BLD STRIP.AUTO-MCNC: 268 MG/DL (ref 65–100)
GLUCOSE SERPL-MCNC: 155 MG/DL (ref 65–100)
HCT VFR BLD AUTO: 21.8 % (ref 36.6–50.3)
HGB BLD-MCNC: 6.6 G/DL (ref 12.1–17)
MAGNESIUM SERPL-MCNC: 2.4 MG/DL (ref 1.6–2.4)
MCH RBC QN AUTO: 23.8 PG (ref 26–34)
MCHC RBC AUTO-ENTMCNC: 30.3 G/DL (ref 30–36.5)
MCV RBC AUTO: 78.7 FL (ref 80–99)
NRBC # BLD: 0.02 K/UL (ref 0–0.01)
NRBC BLD-RTO: 0.2 PER 100 WBC
PLATELET # BLD AUTO: 183 K/UL (ref 150–400)
PMV BLD AUTO: 12.1 FL (ref 8.9–12.9)
POTASSIUM SERPL-SCNC: 4 MMOL/L (ref 3.5–5.1)
PROT SERPL-MCNC: 6.1 G/DL (ref 6.4–8.2)
RBC # BLD AUTO: 2.77 M/UL (ref 4.1–5.7)
SERVICE CMNT-IMP: ABNORMAL
SODIUM SERPL-SCNC: 139 MMOL/L (ref 136–145)
SPECIMEN EXP DATE BLD: NORMAL
STATUS OF UNIT,%ST: NORMAL
STATUS OF UNIT,%ST: NORMAL
UNIT DIVISION, %UDIV: 0
UNIT DIVISION, %UDIV: 0
WBC # BLD AUTO: 9.2 K/UL (ref 4.1–11.1)

## 2018-07-13 PROCEDURE — 97535 SELF CARE MNGMENT TRAINING: CPT

## 2018-07-13 PROCEDURE — 77010033678 HC OXYGEN DAILY

## 2018-07-13 PROCEDURE — 86923 COMPATIBILITY TEST ELECTRIC: CPT | Performed by: NURSE PRACTITIONER

## 2018-07-13 PROCEDURE — 36415 COLL VENOUS BLD VENIPUNCTURE: CPT | Performed by: NURSE PRACTITIONER

## 2018-07-13 PROCEDURE — 83735 ASSAY OF MAGNESIUM: CPT | Performed by: NURSE PRACTITIONER

## 2018-07-13 PROCEDURE — 74011250636 HC RX REV CODE- 250/636: Performed by: NURSE PRACTITIONER

## 2018-07-13 PROCEDURE — P9016 RBC LEUKOCYTES REDUCED: HCPCS | Performed by: NURSE PRACTITIONER

## 2018-07-13 PROCEDURE — 30233N1 TRANSFUSION OF NONAUTOLOGOUS RED BLOOD CELLS INTO PERIPHERAL VEIN, PERCUTANEOUS APPROACH: ICD-10-PCS | Performed by: THORACIC SURGERY (CARDIOTHORACIC VASCULAR SURGERY)

## 2018-07-13 PROCEDURE — 80053 COMPREHEN METABOLIC PANEL: CPT | Performed by: NURSE PRACTITIONER

## 2018-07-13 PROCEDURE — 74011636637 HC RX REV CODE- 636/637: Performed by: NURSE PRACTITIONER

## 2018-07-13 PROCEDURE — 65660000000 HC RM CCU STEPDOWN

## 2018-07-13 PROCEDURE — 74011250637 HC RX REV CODE- 250/637: Performed by: NURSE PRACTITIONER

## 2018-07-13 PROCEDURE — 86900 BLOOD TYPING SEROLOGIC ABO: CPT | Performed by: NURSE PRACTITIONER

## 2018-07-13 PROCEDURE — 36430 TRANSFUSION BLD/BLD COMPNT: CPT

## 2018-07-13 PROCEDURE — 94760 N-INVAS EAR/PLS OXIMETRY 1: CPT

## 2018-07-13 PROCEDURE — 97110 THERAPEUTIC EXERCISES: CPT

## 2018-07-13 PROCEDURE — 82962 GLUCOSE BLOOD TEST: CPT

## 2018-07-13 PROCEDURE — 94640 AIRWAY INHALATION TREATMENT: CPT

## 2018-07-13 PROCEDURE — 71046 X-RAY EXAM CHEST 2 VIEWS: CPT

## 2018-07-13 PROCEDURE — 74011000250 HC RX REV CODE- 250: Performed by: NURSE PRACTITIONER

## 2018-07-13 PROCEDURE — 74011250637 HC RX REV CODE- 250/637: Performed by: THORACIC SURGERY (CARDIOTHORACIC VASCULAR SURGERY)

## 2018-07-13 PROCEDURE — 85027 COMPLETE CBC AUTOMATED: CPT | Performed by: NURSE PRACTITIONER

## 2018-07-13 RX ORDER — METOCLOPRAMIDE HYDROCHLORIDE 5 MG/ML
5 INJECTION INTRAMUSCULAR; INTRAVENOUS EVERY 6 HOURS
Status: COMPLETED | OUTPATIENT
Start: 2018-07-13 | End: 2018-07-15

## 2018-07-13 RX ORDER — INSULIN GLARGINE 100 [IU]/ML
28 INJECTION, SOLUTION SUBCUTANEOUS DAILY
Status: DISCONTINUED | OUTPATIENT
Start: 2018-07-13 | End: 2018-07-14

## 2018-07-13 RX ORDER — INSULIN LISPRO 100 [IU]/ML
3 INJECTION, SOLUTION INTRAVENOUS; SUBCUTANEOUS
Status: DISCONTINUED | OUTPATIENT
Start: 2018-07-13 | End: 2018-07-17

## 2018-07-13 RX ORDER — LOSARTAN POTASSIUM 25 MG/1
25 TABLET ORAL DAILY
Status: DISCONTINUED | OUTPATIENT
Start: 2018-07-13 | End: 2018-07-15

## 2018-07-13 RX ORDER — FUROSEMIDE 40 MG/1
40 TABLET ORAL DAILY
Status: DISCONTINUED | OUTPATIENT
Start: 2018-07-13 | End: 2018-07-18 | Stop reason: HOSPADM

## 2018-07-13 RX ADMIN — BUDESONIDE 500 MCG: 0.5 INHALANT RESPIRATORY (INHALATION) at 10:42

## 2018-07-13 RX ADMIN — CHLORHEXIDINE GLUCONATE 10 ML: 1.2 RINSE ORAL at 18:00

## 2018-07-13 RX ADMIN — METOCLOPRAMIDE 5 MG: 5 INJECTION, SOLUTION INTRAMUSCULAR; INTRAVENOUS at 13:26

## 2018-07-13 RX ADMIN — POLYETHYLENE GLYCOL 3350 17 G: 17 POWDER, FOR SOLUTION ORAL at 09:03

## 2018-07-13 RX ADMIN — FUROSEMIDE 40 MG: 40 TABLET ORAL at 13:27

## 2018-07-13 RX ADMIN — INSULIN LISPRO 3 UNITS: 100 INJECTION, SOLUTION INTRAVENOUS; SUBCUTANEOUS at 07:06

## 2018-07-13 RX ADMIN — FOLIC ACID 1 MG: 1 TABLET ORAL at 09:03

## 2018-07-13 RX ADMIN — INSULIN LISPRO 3 UNITS: 100 INJECTION, SOLUTION INTRAVENOUS; SUBCUTANEOUS at 13:27

## 2018-07-13 RX ADMIN — SITAGLIPTIN 50 MG: 25 TABLET, FILM COATED ORAL at 09:03

## 2018-07-13 RX ADMIN — Medication 10 ML: at 14:00

## 2018-07-13 RX ADMIN — Medication 10 ML: at 07:05

## 2018-07-13 RX ADMIN — ARFORMOTEROL TARTRATE 15 MCG: 15 SOLUTION RESPIRATORY (INHALATION) at 10:41

## 2018-07-13 RX ADMIN — SENNOSIDES AND DOCUSATE SODIUM 1 TABLET: 8.6; 5 TABLET ORAL at 09:03

## 2018-07-13 RX ADMIN — INSULIN LISPRO 3 UNITS: 100 INJECTION, SOLUTION INTRAVENOUS; SUBCUTANEOUS at 17:17

## 2018-07-13 RX ADMIN — METOPROLOL TARTRATE 50 MG: 50 TABLET ORAL at 21:41

## 2018-07-13 RX ADMIN — SENNOSIDES AND DOCUSATE SODIUM 1 TABLET: 8.6; 5 TABLET ORAL at 17:16

## 2018-07-13 RX ADMIN — ENOXAPARIN SODIUM 40 MG: 100 INJECTION SUBCUTANEOUS at 17:17

## 2018-07-13 RX ADMIN — OXYCODONE HYDROCHLORIDE AND ACETAMINOPHEN 500 MG: 500 TABLET ORAL at 09:03

## 2018-07-13 RX ADMIN — ALBUTEROL SULFATE 2.5 MG: 2.5 SOLUTION RESPIRATORY (INHALATION) at 20:09

## 2018-07-13 RX ADMIN — MUPIROCIN: 20 OINTMENT TOPICAL at 09:03

## 2018-07-13 RX ADMIN — EPOETIN ALFA 6000 UNITS: 4000 SOLUTION INTRAVENOUS; SUBCUTANEOUS at 19:18

## 2018-07-13 RX ADMIN — BUDESONIDE 500 MCG: 0.5 INHALANT RESPIRATORY (INHALATION) at 20:10

## 2018-07-13 RX ADMIN — HYDRALAZINE HYDROCHLORIDE 20 MG: 20 INJECTION INTRAMUSCULAR; INTRAVENOUS at 03:36

## 2018-07-13 RX ADMIN — INSULIN GLARGINE 28 UNITS: 100 INJECTION, SOLUTION SUBCUTANEOUS at 17:16

## 2018-07-13 RX ADMIN — ATORVASTATIN CALCIUM 20 MG: 20 TABLET, FILM COATED ORAL at 21:41

## 2018-07-13 RX ADMIN — Medication 400 MG: at 17:16

## 2018-07-13 RX ADMIN — TAMSULOSIN HYDROCHLORIDE 0.4 MG: 0.4 CAPSULE ORAL at 09:03

## 2018-07-13 RX ADMIN — INSULIN LISPRO 5 UNITS: 100 INJECTION, SOLUTION INTRAVENOUS; SUBCUTANEOUS at 13:27

## 2018-07-13 RX ADMIN — ACETAMINOPHEN 1000 MG: 10 INJECTION, SOLUTION INTRAVENOUS at 21:42

## 2018-07-13 RX ADMIN — LOSARTAN POTASSIUM 25 MG: 25 TABLET ORAL at 13:27

## 2018-07-13 RX ADMIN — IRON SUCROSE 100 MG: 20 INJECTION, SOLUTION INTRAVENOUS at 09:21

## 2018-07-13 RX ADMIN — Medication 10 ML: at 21:42

## 2018-07-13 RX ADMIN — METOCLOPRAMIDE 5 MG: 5 INJECTION, SOLUTION INTRAMUSCULAR; INTRAVENOUS at 17:16

## 2018-07-13 RX ADMIN — Medication 10 ML: at 03:36

## 2018-07-13 RX ADMIN — METOPROLOL TARTRATE 50 MG: 50 TABLET ORAL at 09:03

## 2018-07-13 RX ADMIN — Medication 500 MCG: at 09:03

## 2018-07-13 RX ADMIN — FAMOTIDINE 20 MG: 20 TABLET ORAL at 09:03

## 2018-07-13 RX ADMIN — MUPIROCIN: 20 OINTMENT TOPICAL at 18:00

## 2018-07-13 RX ADMIN — CHLORHEXIDINE GLUCONATE 10 ML: 1.2 RINSE ORAL at 09:03

## 2018-07-13 RX ADMIN — Medication 400 MG: at 09:03

## 2018-07-13 RX ADMIN — ASPIRIN 81 MG 81 MG: 81 TABLET ORAL at 09:03

## 2018-07-13 RX ADMIN — METOCLOPRAMIDE 5 MG: 5 INJECTION, SOLUTION INTRAMUSCULAR; INTRAVENOUS at 03:36

## 2018-07-13 NOTE — DIABETES MGMT
DTC Progress Note    Recommendations/ Comments: Chart reviewed for hyperglycemia. Last two bg values in 200s. Noted pt was transitioned off insulin drip last night. He received 26 units of Lantus 2 hours prior. Six hours prior to transition he received 10.6 units of insulin from insulin drip. Noted Sitagliptin was added this morning. If appropriate, please consider:  - discontinue Sitagliptin while pt is on clear liquid diet and ileus is resolved   - Adding Humalog 3 units AC   - Adding  Lantus 28 units daily     Current hospital DM medication: Humalog for correction, normal sensitivity scale, Sitagliptin 50 mg daily     Chart reviewed on [address] [place] [address]. Patient is a 78 y.o. male with hx Type 2 Diabetes on Glipizide 10 mg BID, Sitagliptin 50 mg daily and Metformin 1000 mg BID  at home. A1c:   Lab Results   Component Value Date/Time    Hemoglobin A1c 7.4 (H) 06/22/2018 01:53 PM    Hemoglobin A1c 7.0 (H) 03/16/2018 09:28 AM       Recent Glucose Results:   Lab Results   Component Value Date/Time     (H) 07/13/2018 03:50 AM    GLUCPOC 268 (H) 07/13/2018 11:28 AM    GLUCPOC 239 (H) 07/13/2018 06:31 AM    GLUCPOC 82 07/12/2018 09:22 PM        Lab Results   Component Value Date/Time    Creatinine 1.22 07/13/2018 03:50 AM     Estimated Creatinine Clearance: 47.9 mL/min (based on Cr of 1.22). Active Orders   Diet    DIET CLEAR LIQUID No Conc. Sweets        PO intake:   Patient Vitals for the past 72 hrs:   % Diet Eaten   07/12/18 1724 50 %   07/12/18 1108 50 %   07/12/18 0800 10 %   07/11/18 0933 10 %       Will continue to follow as needed.     Thank you  Perez Pope RD

## 2018-07-13 NOTE — PROGRESS NOTES
Problem: CABG: Post-Op Day 3  Goal: *Tolerating diet  Outcome: Not Progressing Towards Goal  Variance: Patient Condition  Comments: Patient diet changed back to clear liquids today, as patient was not tolerating GI lite. Patient's abdomen is distended. Patient passing flatus with active bowel sounds. Abdominal imaging 7/12/18 shows ileus. Patient did have one BM on 7/12/18.

## 2018-07-13 NOTE — PROGRESS NOTES
CSS Progress Note    Admit Date: 2018  POD:  5 Day Post-Op    Procedure:  Procedure(s):  CORONARY ARTERY BYPASS GRAFT x 3 with Left Internal Mammary Artery and Right Greater Saphenous Vein - Transesphageal EchoCardiogram and EpiAortic Ultrsound performed by Dr. Douglas Court        Subjective:   Pt seen with Dr. Geneva Calixto, pt sitting up in chair, looks well today - pt stating he feels better as well. +BM x 2     Objective:   Vitals:  Blood pressure 133/51, pulse 90, temperature 97.8 °F (36.6 °C), resp. rate 22, height 5' 3\" (1.6 m), weight 141 lb 5 oz (64.1 kg), SpO2 98 %. Temp (24hrs), Av.4 °F (36.9 °C), Min:97.8 °F (36.6 °C), Max:99.1 °F (37.3 °C)    EKG/Rhythm: SR     Oxygen Therapy:  Oxygen Therapy  O2 Sat (%): 98 % (18)  Pulse via Oximetry: 92 beats per minute (18)  O2 Device: Nasal cannula (18)  O2 Flow Rate (L/min): 1 l/min (18)  FIO2 (%): 40 % (18 1214)    CXR:   CXR Results  (Last 48 hours)               18 1632  XR CHEST PA LAT Final result    Impression:  IMPRESSION:    Left lower lobe atelectasis or infiltrate with pleural effusion, worsened since   prior study 2018. .  . Small right pleural effusion, new. Narrative:  INDICATION:  postop heart - pt may travel without RN. EXAM: 2 VIEW CHEST RADIOGRAPH. COMPARISON: 2018, 11/10/2016. FINDINGS: Frontal and lateral views of the chest show persistent, minimally   worsened left lower lobe atelectasis or infiltrate with pleural effusion. There   is a small right pleural effusion as well, possibly new. Interstitial markings   are mildly prominent but stable. . . The heart, mediastinum and pulmonary   vasculature are stable status post median sternotomy. .  The bony thorax is   unremarkable for age.  ..                   Admission Weight: Last Weight   Weight: 143 lb (64.9 kg) Weight: 141 lb 5 oz (64.1 kg)     Intake / Output / Drain:  Current Shift:    Last 24 hrs.: Intake/Output Summary (Last 24 hours) at 18 0928  Last data filed at 18 0347   Gross per 24 hour   Intake              520 ml   Output             1200 ml   Net             -680 ml       EXAM:  General:  No obvious distress                                                                                        Lungs:   Clear to auscultation bilaterally, diminished in bases. Incision:  No erythema, drainage or swelling   Heart:  Regular rate and rhythm, S1, S2 normal, no murmur, click, rub or gallop. Abdomen:   Softer, no distention. Bowel sounds active. No masses,  No organomegaly. Extremities:  No edema. PPP. Neurologic:  Gross motor and sensory apparatus intact. Labs:   Recent Labs      18   0726  18   0449   WBC   --   8.5   HGB   --   8.8*   HCT   --   28.0*   PLT   --   223   NA   --   138   K   --   3.7   BUN   --   36*   CREA   --   1.28   GLU   --   140*   GLUCPOC  170*   --         Assessment:     Principal Problem:    S/P CABG x 3 (2018)      Overview: Coronary Artery Bypass Grafting x 3, LIMA to LAD, RSVG to OM, RSVG to RCA      Right GSV EVH    Active Problems:    CAD, multiple vessel (2018)      CAD (coronary artery disease) (2018)         Plan/Recommendations/Medical Decision Makin. S/p CABG x 3:  On asa, statin, and BB. 2. HTN: Cont BB, losartan. BP better this morning, if elevated again will add norvasc. PRN hydralazine, Monitor   3. Atelectasis/wheezing: wean oxygen for 02 sat > 92%. Increase IS and activity as tolerated. Cont PO lasix. Cont albuterol q6h nebs, steroid nebs. 4. DM: Hgba1c 7.4. DTC consult. Resumed [place] and [place]. Resume cardiac diet consistent carb -- NO JUICE. Cont BS ACHS, SSI. Need to resume metformin/glipizide when appropriate. Lantus added 18 - increase today, on mealtime 3 units as well  5.  Acute anemia s/t post op blood loss & iron deficiency/B12 def: Recently had EGD/colonscopy after discovery of anemia during hospitalization. Showed gastritis. Cont pepcid. Cont IV iron - last dose today, PO V09, folic acid and Vit C(caution d/t renal insuff). Monitor H/H. Pt lethargic/symptomatic with activity. Cont epogen 3x/week. 6. Hyperlipidemia: cont statin. 7. Renal insufficiency: Cr up slightly, cont current meds for now but if rise again will need to adjust.  Resumed ARB -- watch closely! Monitor. Avoid nephrotoxic meds. Known recently to have hyperkalemia on outpt labs. K stable today. 8. Postop urinary retention:  Vasquez removed 7/13/18, pt reports he is voiding. On flomax. Cont lasix. 9. Distended abd/postop ileus: Repeat KUB tomorrow, abd less distended, soft, +BM. Cont reglan, pericolace, miralax. MOBILIZE. Limit narcotics. 10.  Dispo: PT/OT. Likely need inpt rehab--pending.       Signed By: Mitzie Boeck, NP

## 2018-07-13 NOTE — PROGRESS NOTES
Physical Therapy  7/13/2018    Chart reviewed. Noted pt w/ Hgb of 6.6 and to receive PRBC's this day. Will defer therapy at this time and f/u w/pt tomorrow as able and appropriate.      Yamilka Means, PTA

## 2018-07-13 NOTE — PROGRESS NOTES
Bedside shift change report given to Charmaine (oncoming nurse) by Seun Ortiz (offgoing nurse). Report included the following information SBAR, Intake/Output, MAR, Accordion, Recent Results, Med Rec Status and Cardiac Rhythm NSR.     0745: Bedside shift change report given to Seun Ortiz (oncoming nurse) by Jeff Ag (offgoing nurse). Report included the following information SBAR, Intake/Output, MAR, Accordion, Recent Results, Med Rec Status and Cardiac Rhythm NSR.

## 2018-07-13 NOTE — DIABETES MGMT
DTC Progress Note    Recommendations/ Comments: Chart reviewed for hyperglycemia.  mg/dl this morning. Noted pt was transitioned off insulin drip last night. He received 26 units of Lantus 2 hours prior. Six hours prior to transition he received 10.6 units of insulin from insulin drip. Noted Sitagliptin was added this morning. If appropriate, please consider:  - discontinue Sitagliptin while pt is on clear liquid diet and ileus is resolved     Will add additional recommendations once more blood sugar values are available    Current hospital DM medication: Humalog for correction, normal sensitivity scale, Sitagliptin 50 mg daily     Chart reviewed on 5 [facility]. Patient is a 78 y.o. male with hx Type 2 Diabetes on Glipizide 10 mg BID, Sitagliptin 50 mg daily and Metformin 1000 mg BID  at home. A1c:   Lab Results   Component Value Date/Time    Hemoglobin A1c 7.4 (H) 06/22/2018 01:53 PM    Hemoglobin A1c 7.0 (H) 03/16/2018 09:28 AM       Recent Glucose Results: Lab Results   Component Value Date/Time     (H) 07/13/2018 03:50 AM    GLUCPOC 239 (H) 07/13/2018 06:31 AM    GLUCPOC 82 07/12/2018 09:22 PM    GLUCPOC 83 07/12/2018 08:56 PM        Lab Results   Component Value Date/Time    Creatinine 1.22 07/13/2018 03:50 AM     Estimated Creatinine Clearance: 47.9 mL/min (based on Cr of 1.22). Active Orders   Diet    DIET CLEAR LIQUID No Conc. Sweets        PO intake: Patient Vitals for the past 72 hrs:   % Diet Eaten   07/12/18 1724 50 %   07/12/18 1108 50 %   07/12/18 0800 10 %   07/11/18 0933 10 %       Will continue to follow as needed.     Thank you  Katelin Pope RD

## 2018-07-13 NOTE — PROGRESS NOTES
CSS Progress Note    Admit Date: 2018  POD:  4 Day Post-Op    Procedure:  Procedure(s):  CORONARY ARTERY BYPASS GRAFT x 3 with Left Internal Mammary Artery and Right Greater Saphenous Vein - Transesphageal EchoCardiogram and EpiAortic Ultrsound performed by Dr. Bandar Narayanan        Subjective:   Pt seen with Dr. Zaida Scott. Afebrile, back on 2 L NC for comfort. Resting in bed.   + BM      Objective:   Vitals:  Blood pressure 144/61, pulse 99, temperature 98 °F (36.7 °C), resp. rate 20, height 5' 3\" (1.6 m), weight 142 lb 13.7 oz (64.8 kg), SpO2 98 %. Temp (24hrs), Av °F (36.7 °C), Min:97.7 °F (36.5 °C), Max:98.4 °F (36.9 °C)    EKG/Rhythm:  SR     Oxygen Therapy:  Oxygen Therapy  O2 Sat (%): 98 % (18 0814)  Pulse via Oximetry: 101 beats per minute (18 1945)  O2 Device: Nasal cannula (18 0430)  O2 Flow Rate (L/min): 2 l/min (18 0430)  FIO2 (%): 40 % (18 1214)    CXR:   CXR Results  (Last 48 hours)               18 0436  XR CHEST PORT Final result    Impression:  IMPRESSION: No change in patchy left basilar airspace opacification. Interval   removal of vascular sheath and pulmonary arterial catheter. Narrative:  EXAM:  XR CHEST PORT       INDICATION:  postop heart       COMPARISON:         FINDINGS: A portable AP radiograph of the chest was obtained at 0353 hours. The   patient is status post median sternotomy. The vascular sheath and pulmonary   arterial catheter have been removed in the interval. The patient is on a cardiac   monitor. There is patchy opacification in the left lower lobe without change. The cardiac and mediastinal contours and pulmonary vascularity are normal.  The   bones and soft tissues are grossly within normal limits.                     Admission Weight: Last Weight   Weight: 143 lb (64.9 kg) Weight: 142 lb 13.7 oz (64.8 kg)     Intake / Output / Drain:  Current Shift:    Last 24 hrs.:     Intake/Output Summary (Last 24 hours) at 18 1011  Last data filed at 18 0352   Gross per 24 hour   Intake           362.76 ml   Output             1450 ml   Net         -1087.24 ml       EXAM:  General:  No obvious distress                                                                                        Lungs:   Clear to auscultation bilaterally, diminished in bases. Incision:  Drs C/D/I   Heart:  Regular rate and rhythm, S1, S2 normal, no murmur, click, rub or gallop. Abdomen:   Softer, distended. Bowel sounds active. No masses,  No organomegaly. Extremities:  No edema. PPP. Neurologic:  Gross motor and sensory apparatus intact. Labs:   Recent Labs      18   0631  18   0350   WBC   --   9.2   HGB   --   6.6*   HCT   --   21.8*   PLT   --   183   NA   --   139   K   --   4.0   BUN   --   32*   CREA   --   1.22   GLU   --   155*   GLUCPOC  239*   --         Assessment:     Principal Problem:    S/P CABG x 3 (2018)      Overview: Coronary Artery Bypass Grafting x 3, LIMA to LAD, RSVG to OM, RSVG to RCA      Right GSV EVH    Active Problems:    CAD, multiple vessel (2018)      CAD (coronary artery disease) (2018)         Plan/Recommendations/Medical Decision Makin. S/p CABG x 3:  On asa, statin, and BB. 2. HTN: Cont BB, resume low dose losartan. PRN hydralazine, Monitor   3. Atelectasis/wheezing: wean oxygen for 02 sat > 92%. Increase IS and activity as tolerated. Cont PO lasix. Cont albuterol q6h nebs, steroid nebs. PA/lat today. 4. DM: Hgba1c 7.4. DTC consult. Resume januvia. On clear liquids -- NO JUICE. Cont BS ACHS, SSI. Need to resume metformin/glipizide when appropriate. May need some lispro meal coverage? Discussed w/ DTC. 5. Acute anemia s/t post op blood loss & iron deficiency/B12 def: Recently had EGD/colonscopy after discovery of anemia during hospitalization. Showed gastritis. Cont pepcid. Cont IV iron, PO W32, folic acid and Vit C(caution d/t renal insuff). Monitor H/H. Pt lethargic/symptomatic with activity. Give 1 unit pRBC now. Start epogen 3x/week. 6. Hyperlipidemia: cont statin. 7. Renal insufficiency: Creat back down. Resume ARB -- watch closely! Monitor. Avoid nephrotoxic meds. Known recently to have hyperkalemia on outpt labs. K stable today. 8. Postop urinary retention:  Vasquez replaced 7/11 in evening. On flomax. void trial Friday. Cont lasix. 9. Distended abd/postop ileus: Repeat KUB Saturday. cont reglan, pericolace, miralax. MOBILIZE. Limit narcotics. 10.  Dispo: PT/OT. Likely need inpt rehab--pending. Update: In discussion w/ DTC this morning, januvia resumed. Will now stop d/t concerns for GI slowing in pts w/ ileus. Already got dose today. Started lispro 3 units TID. Will need lantus for tonight-start 28 units w/ dinner. Strongly discussed not drinking sugary beverages.      Signed By: Shamir Rodriguez NP

## 2018-07-13 NOTE — DIABETES MGMT
DTC Cardiac Surgery Education Note      Chart reviewed and evaluation complete on [facility] Kendall. Patient is a 78 y.o. male with hx Type 2 Diabetes on Glipizide 10 mg BID, Januvia 50 mg daily and Metformin 1000 mg BID  at home. A1c:   Lab Results   Component Value Date/Time    Hemoglobin A1c 7.4 (H) 2018 01:53 PM       Spoke with patient and wife at bedside. Pt was sleepy at the time of visit and was not very engaged. His wife has a good understanding of diabetes and carbohydrate counting as she has attended [facility] for diabetes education in 2017. She will reinforce blood glucose guidelines a home. Pt's wife reports that he does not drink sweet beverages at home and eats 3 meals a day. She states that he eats moderate portions of carbohydrate. He does not check blood sugars at home but they are willing to check twice a day upon discharge.     Assessed and instructed patient on the following:   · risk of sternal wound infection/ delayed healing   · interpretation of lab results, blood sugar goals, Y6H target, complications of diabetes mellitus, hypoglycemia prevention and treatment, exercise, SMBG skills and nutrition    Encouraged the following: dietary modifications: eat regular meals, avoid concentrated sweets, regular blood sugar monitorin times daily, regular follow ups with PCP for diabetes managemtn    Provided patient with the following: [x]         Survival skills education materials               []         Insulin education materials               []         CHO counting education materials               [x]         BG guidelines for post-op patients                  [x]         Outpatient DTC contact number               [x]         Glucometer- One Touch Verio        Recent Glucose Results: Lab Results   Component Value Date/Time     (H) 2018 03:50 AM    GLUCPOC 239 (H) 2018 06:31 AM    GLUCPOC 82 2018 09:22 PM    GLUCPOC 83 2018 08:56 PM        Lab Results   Component Value Date/Time    Creatinine 1.22 07/13/2018 03:50 AM     Estimated Creatinine Clearance: 47.9 mL/min (based on Cr of 1.22). Active Orders   Diet    DIET CLEAR LIQUID No Conc. Sweets        PO intake: Patient Vitals for the past 72 hrs:   % Diet Eaten   07/12/18 1724 50 %   07/12/18 1108 50 %   07/12/18 0800 10 %   07/11/18 0933 10 %       Will continue to follow as needed. Thank you.     Tashia Pope, RD

## 2018-07-13 NOTE — PROGRESS NOTES
Patient accepted to [facility] for rehab when discharged-[place] location. Angelina Marion [phone] will be following patient. Patient will be in the hospital over the weekend.    [facility] is beginning the authorization from insurance company for patient to be admitted to the facility for rehab

## 2018-07-13 NOTE — PROGRESS NOTES
Cardiac Surgery Care Coordinator- Met with Akiko Manning, reviewed plan of care. Reinforced sternal precautions and encouraged continued use of the incentive spirometer. Reviewed goals for the day and emphasized the importance of increased activity to meet discharge goals. Mr Edmond Kaur is receiving blood at this time. Red bracelet on right wrist reviewed purpose of the bracelet and when to contact MD. Will continue to follow for educational and emotional needs.  Ctahy Piña RN

## 2018-07-13 NOTE — DIABETES MGMT
DTC Progress Note    Recommendations/ Comments: Chart reviewed for hyperglycemia, in spite of addition of Humalog 3 units AC today. He received 8 units of Humalog at 1327 and his blood sugar at 1637 was 214 mg/dl (268 mg/dl at 1128)  He has received 11 units of correction insulin today since 7am    If appropriate, please consider:  - Increasing Lantus to 36 units daily  - Reassess post prandial blood sugars and increase mealtime Humalog as needed    Current hospital DM medication: Humalog for correction, normal sensitivity scale, Sitagliptin 50 mg daily     Chart reviewed on 5 [facility]. Patient is a 78 y.o. male with hx Type 2 Diabetes on Glipizide 10 mg BID, Sitagliptin 50 mg daily and Metformin 1000 mg BID  at home. A1c:   Lab Results   Component Value Date/Time    Hemoglobin A1c 7.4 (H) 06/22/2018 01:53 PM    Hemoglobin A1c 7.0 (H) 03/16/2018 09:28 AM       Recent Glucose Results:   Lab Results   Component Value Date/Time     (H) 07/13/2018 03:50 AM    GLUCPOC 214 (H) 07/13/2018 04:37 PM    GLUCPOC 268 (H) 07/13/2018 11:28 AM    GLUCPOC 239 (H) 07/13/2018 06:31 AM        Lab Results   Component Value Date/Time    Creatinine 1.22 07/13/2018 03:50 AM     Estimated Creatinine Clearance: 47.9 mL/min (based on Cr of 1.22). Active Orders   Diet    DIET CLEAR LIQUID No Conc. Sweets        PO intake:   Patient Vitals for the past 72 hrs:   % Diet Eaten   07/13/18 1231 30 %   07/13/18 0814 20 %   07/12/18 1724 50 %   07/12/18 1108 50 %   07/12/18 0800 10 %   07/11/18 0933 10 %       Will continue to follow as needed.     Thank you  Kell Pope RD

## 2018-07-13 NOTE — PROGRESS NOTES
Spiritual Care Partner Volunteer visited patient in room 464/01 on 7.13.18. Documented by: : Rev. Olegario Smith.  Sandra Gill; [facility], to contact [phone] Kostas Dave call: [phone]

## 2018-07-13 NOTE — PROGRESS NOTES
Problem: Self Care Deficits Care Plan (Adult)  Goal: *Acute Goals and Plan of Care (Insert Text)  Occupational Therapy Goals  Initiated 7/10/2018  1. Patient will perform ADLs standing 5 mins without fatigue or LOB with supervision/set-up within 7 day(s). 2.  Patient will perform lower body ADLs with minimal assistance/contact guard assist and AE PRN within 7 day(s). 3.  Patient will perform upper body dressing with modified independence within 7 day(s). 4.  Patient will perform toilet transfers with supervision/set-up within 7 day(s). 5.  Patient will perform all aspects of toileting with supervision within 7 day(s). 6.  Patient will participate in cardiac/sternal upper extremity therapeutic exercise/activities to increase independence with ADLs with supervision/set-up for 5 minutes within 7 day(s). Occupational Therapy TREATMENT  Patient: Barney Jane (69 y.o. male)  Date: 7/13/2018  Diagnosis: CAD   CAD (coronary artery disease) S/P CABG x 3  Procedure(s) (LRB):  CORONARY ARTERY BYPASS GRAFT x 3 with Left Internal Mammary Artery and Right Greater Saphenous Vein - Transesphageal EchoCardiogram and EpiAortic Ultrsound performed by  [facility] (N/A) 4 Days Post-Op  Precautions: Fall, Sternal  Chart, occupational therapy assessment, plan of care, and goals were reviewed. ASSESSMENT:  Patient received in supine, agreeable to therapy. RN reporting low HGB this am, requesting seated activities as patient to be receive blood later today. Dependently repositioned into bed/chair position, completing 6/6 BUE cardiac exercises with rest breaks in between reps and cues for PLB. O2 sats stable throughout, however noted SOB and light BUE tremors with exertion. Set up for grooming, cues for pacing d/t increased SOB, patient left in modified bed/chair position finishing breakfast (continues with clear liquid diet d/t + ileus and cholelithiasis per abdominal XR).  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 Progression toward goals:  []       Improving appropriately and progressing toward goals  [x]       Improving slowly and progressing toward goals  []       Not making progress toward goals and plan of care will be adjusted     PLAN:  Patient continues to benefit from skilled intervention to address the above impairments. Continue treatment per established plan of care. Discharge Recommendations:  Inpatient Rehab  Further Equipment Recommendations for Discharge:  TBD by rehab     SUBJECTIVE:   Patient stated I feel okay, just tired.     The patient stated 3/3 sternal precautions. Reviewed all 3 with patient. OBJECTIVE DATA SUMMARY:   Cognitive/Behavioral Status:  Neurologic State: Alert  Orientation Level: Oriented X4  Cognition: Appropriate for age attention/concentration; Follows commands  Perception: Appears intact  Perseveration: No perseveration noted  Safety/Judgement: Awareness of environment; Fall prevention    Functional Mobility and Transfers for ADLs:  Bed Mobility:  Supine to Sit:  (bed mechanics)  Scooting: Stand-by assistance    Balance:  Sitting: Intact  Standing:  (NT d/t low HGB, SOB with DONALDSON)    ADL Intervention:  Feeding  Feeding Assistance: Modified independent (clear liquid diet)  Container Management: Independent  Utensil Management: Independent  Food to Mouth: Independent  Drink to Mouth: Independent    Grooming  Grooming Assistance: Supervision/set up (bed/chair position)  Washing Face: Supervision/set-up  Brushing Teeth: Supervision/set-up (increased SOB, cues for pacing)  Cues: Verbal cues provided (pacing)    Toileting  Toileting Assistance:  (RN reporting patient with BM on BSC this AM)    Cognitive Retraining  Safety/Judgement: Awareness of environment; Fall prevention    Patient instructed no asymmetrical reaching over head to ensure B UEs when shoulders >90* i.e. reaching in cabinets and dressing. Instruction on upper body dressing techniques of over head, then arms through to decrease pain and unilateral shoulder flexion >90*. Instruction on the benefits of utilizing B UEs during functional tasks i.e. opening the fridge, stepping into the tub. Increase activity tolerance for home, work, and sexual intercourse by pacing self with increasing the arm exercises, sitting duration, frequency OOB, walking, standing, and ADLs. Instructed and indicated understanding of s/s of too much activity, how to respond to s/s safely. Therapeutic Exercises:   Patient instructed on the benefits and demonstrated cardiac exercises while seated in bed/chair with rest breaks & Supervision. Instructed and indicated understanding on how to progress reps, sets against gravity, working up to 5 lbs, standing and so on based on surgeon clearance for more weight in prep for basic and instrumental ADLs. Instruction on the use of household items in place of weights as needed.     CARDIAC   EXERCISE    Sets    Reps    Active  Active Assist    Passive  Self ROM    Comments    Shoulder flexion  1  5   [x]                            []                             []                             []                             Cues for pacing & PLB   Shoulder abduction  1  5  [x]                             []                             []                             []                             Cues for pacing & PLB   Scapular elevation  1  5  [x]                             []                              []                             []                             Cues for pacing & PLB   Scapular retraction  1  5  [x]                             []                             []                             []                             Cues for pacing, technique & PLB   Trunk rotation  1  5  [x]                             [] []                             []                             Cues for pacing & PLB   Trunk sidebending  1  5  [x]                             []                              []                             []                                   Cues for pacing & PLB       Pain:  Pain Scale 1: Numeric (0 - 10)  Pain Intensity 1: 3  Pain Location 1: Incisional  Pain Orientation 1: Anterior  Pain Description 1: Sore  Pain Intervention(s) 1: Declines    Activity Tolerance:   Fair, limited by decreased activity tolerance, low HGB, & fatigue this session    Please refer to the flowsheet for vital signs taken during this treatment.   After treatment:   [x] Patient left in no apparent distress sitting up in bed/chair  [] Patient left in no apparent distress in bed  [x] Call bell left within reach  [x] Nursing notified  [] Caregiver present  [] Bed alarm activated    COMMUNICATION/COLLABORATION:   The patients plan of care was discussed with: Physical Therapist and Registered Nurse    Bayron Olivarez OT  Time Calculation: 29 mins

## 2018-07-14 ENCOUNTER — APPOINTMENT (OUTPATIENT)
Dept: GENERAL RADIOLOGY | Age: 68
DRG: 236 | End: 2018-07-14
Attending: NURSE PRACTITIONER
Payer: MEDICARE

## 2018-07-14 LAB
ABO + RH BLD: NORMAL
ALBUMIN SERPL-MCNC: 2.8 G/DL (ref 3.5–5)
ALBUMIN/GLOB SERPL: 0.8 {RATIO} (ref 1.1–2.2)
ALP SERPL-CCNC: 103 U/L (ref 45–117)
ALT SERPL-CCNC: 32 U/L (ref 12–78)
ANION GAP SERPL CALC-SCNC: 9 MMOL/L (ref 5–15)
AST SERPL-CCNC: 29 U/L (ref 15–37)
BILIRUB SERPL-MCNC: 0.5 MG/DL (ref 0.2–1)
BLD PROD TYP BPU: NORMAL
BLD PROD TYP BPU: NORMAL
BLOOD GROUP ANTIBODIES SERPL: NORMAL
BPU ID: NORMAL
BPU ID: NORMAL
BUN SERPL-MCNC: 36 MG/DL (ref 6–20)
BUN/CREAT SERPL: 28 (ref 12–20)
CALCIUM SERPL-MCNC: 8.4 MG/DL (ref 8.5–10.1)
CHLORIDE SERPL-SCNC: 106 MMOL/L (ref 97–108)
CO2 SERPL-SCNC: 23 MMOL/L (ref 21–32)
CREAT SERPL-MCNC: 1.28 MG/DL (ref 0.7–1.3)
CROSSMATCH RESULT,%XM: NORMAL
CROSSMATCH RESULT,%XM: NORMAL
ERYTHROCYTE [DISTWIDTH] IN BLOOD BY AUTOMATED COUNT: 19.5 % (ref 11.5–14.5)
GLOBULIN SER CALC-MCNC: 3.6 G/DL (ref 2–4)
GLUCOSE BLD STRIP.AUTO-MCNC: 112 MG/DL (ref 65–100)
GLUCOSE BLD STRIP.AUTO-MCNC: 159 MG/DL (ref 65–100)
GLUCOSE BLD STRIP.AUTO-MCNC: 170 MG/DL (ref 65–100)
GLUCOSE BLD STRIP.AUTO-MCNC: 297 MG/DL (ref 65–100)
GLUCOSE SERPL-MCNC: 140 MG/DL (ref 65–100)
HCT VFR BLD AUTO: 28 % (ref 36.6–50.3)
HGB BLD-MCNC: 8.8 G/DL (ref 12.1–17)
MAGNESIUM SERPL-MCNC: 2.6 MG/DL (ref 1.6–2.4)
MCH RBC QN AUTO: 25.1 PG (ref 26–34)
MCHC RBC AUTO-ENTMCNC: 31.4 G/DL (ref 30–36.5)
MCV RBC AUTO: 80 FL (ref 80–99)
NRBC # BLD: 0.04 K/UL (ref 0–0.01)
NRBC BLD-RTO: 0.5 PER 100 WBC
PLATELET # BLD AUTO: 223 K/UL (ref 150–400)
PMV BLD AUTO: 11.6 FL (ref 8.9–12.9)
POTASSIUM SERPL-SCNC: 3.7 MMOL/L (ref 3.5–5.1)
PROT SERPL-MCNC: 6.4 G/DL (ref 6.4–8.2)
RBC # BLD AUTO: 3.5 M/UL (ref 4.1–5.7)
SERVICE CMNT-IMP: ABNORMAL
SODIUM SERPL-SCNC: 138 MMOL/L (ref 136–145)
SPECIMEN EXP DATE BLD: NORMAL
STATUS OF UNIT,%ST: NORMAL
STATUS OF UNIT,%ST: NORMAL
UNIT DIVISION, %UDIV: 0
UNIT DIVISION, %UDIV: 0
WBC # BLD AUTO: 8.5 K/UL (ref 4.1–11.1)

## 2018-07-14 PROCEDURE — 74018 RADEX ABDOMEN 1 VIEW: CPT

## 2018-07-14 PROCEDURE — 74011000250 HC RX REV CODE- 250: Performed by: NURSE PRACTITIONER

## 2018-07-14 PROCEDURE — 74011250636 HC RX REV CODE- 250/636: Performed by: NURSE PRACTITIONER

## 2018-07-14 PROCEDURE — 74011250637 HC RX REV CODE- 250/637: Performed by: NURSE PRACTITIONER

## 2018-07-14 PROCEDURE — 36415 COLL VENOUS BLD VENIPUNCTURE: CPT | Performed by: NURSE PRACTITIONER

## 2018-07-14 PROCEDURE — 74011250637 HC RX REV CODE- 250/637: Performed by: THORACIC SURGERY (CARDIOTHORACIC VASCULAR SURGERY)

## 2018-07-14 PROCEDURE — 97116 GAIT TRAINING THERAPY: CPT

## 2018-07-14 PROCEDURE — 74011636637 HC RX REV CODE- 636/637: Performed by: NURSE PRACTITIONER

## 2018-07-14 PROCEDURE — 65660000000 HC RM CCU STEPDOWN

## 2018-07-14 PROCEDURE — 77010033678 HC OXYGEN DAILY

## 2018-07-14 PROCEDURE — 85027 COMPLETE CBC AUTOMATED: CPT | Performed by: NURSE PRACTITIONER

## 2018-07-14 PROCEDURE — 83735 ASSAY OF MAGNESIUM: CPT | Performed by: NURSE PRACTITIONER

## 2018-07-14 PROCEDURE — 94760 N-INVAS EAR/PLS OXIMETRY 1: CPT

## 2018-07-14 PROCEDURE — 82962 GLUCOSE BLOOD TEST: CPT

## 2018-07-14 PROCEDURE — 80053 COMPREHEN METABOLIC PANEL: CPT | Performed by: NURSE PRACTITIONER

## 2018-07-14 PROCEDURE — 94640 AIRWAY INHALATION TREATMENT: CPT

## 2018-07-14 RX ORDER — INSULIN GLARGINE 100 [IU]/ML
32 INJECTION, SOLUTION SUBCUTANEOUS DAILY
Status: DISCONTINUED | OUTPATIENT
Start: 2018-07-14 | End: 2018-07-16

## 2018-07-14 RX ORDER — ALBUTEROL SULFATE 0.83 MG/ML
2.5 SOLUTION RESPIRATORY (INHALATION)
Status: DISCONTINUED | OUTPATIENT
Start: 2018-07-15 | End: 2018-07-18 | Stop reason: HOSPADM

## 2018-07-14 RX ADMIN — ENOXAPARIN SODIUM 40 MG: 100 INJECTION SUBCUTANEOUS at 17:57

## 2018-07-14 RX ADMIN — METOPROLOL TARTRATE 50 MG: 50 TABLET ORAL at 21:04

## 2018-07-14 RX ADMIN — Medication 400 MG: at 09:17

## 2018-07-14 RX ADMIN — TAMSULOSIN HYDROCHLORIDE 0.4 MG: 0.4 CAPSULE ORAL at 09:18

## 2018-07-14 RX ADMIN — METOCLOPRAMIDE 5 MG: 5 INJECTION, SOLUTION INTRAMUSCULAR; INTRAVENOUS at 18:00

## 2018-07-14 RX ADMIN — METOCLOPRAMIDE 5 MG: 5 INJECTION, SOLUTION INTRAMUSCULAR; INTRAVENOUS at 06:47

## 2018-07-14 RX ADMIN — METOCLOPRAMIDE 5 MG: 5 INJECTION, SOLUTION INTRAMUSCULAR; INTRAVENOUS at 12:00

## 2018-07-14 RX ADMIN — METOCLOPRAMIDE 5 MG: 5 INJECTION, SOLUTION INTRAMUSCULAR; INTRAVENOUS at 00:05

## 2018-07-14 RX ADMIN — INSULIN LISPRO 5 UNITS: 100 INJECTION, SOLUTION INTRAVENOUS; SUBCUTANEOUS at 12:29

## 2018-07-14 RX ADMIN — FUROSEMIDE 40 MG: 40 TABLET ORAL at 09:17

## 2018-07-14 RX ADMIN — Medication 10 ML: at 23:28

## 2018-07-14 RX ADMIN — Medication 400 MG: at 17:57

## 2018-07-14 RX ADMIN — BUDESONIDE 500 MCG: 0.5 INHALANT RESPIRATORY (INHALATION) at 19:37

## 2018-07-14 RX ADMIN — Medication 500 MCG: at 09:00

## 2018-07-14 RX ADMIN — METOPROLOL TARTRATE 50 MG: 50 TABLET ORAL at 09:18

## 2018-07-14 RX ADMIN — INSULIN LISPRO 2 UNITS: 100 INJECTION, SOLUTION INTRAVENOUS; SUBCUTANEOUS at 17:58

## 2018-07-14 RX ADMIN — Medication 10 ML: at 14:00

## 2018-07-14 RX ADMIN — ASPIRIN 81 MG 81 MG: 81 TABLET ORAL at 09:17

## 2018-07-14 RX ADMIN — INSULIN LISPRO 3 UNITS: 100 INJECTION, SOLUTION INTRAVENOUS; SUBCUTANEOUS at 09:39

## 2018-07-14 RX ADMIN — HYDRALAZINE HYDROCHLORIDE 10 MG: 20 INJECTION INTRAMUSCULAR; INTRAVENOUS at 04:18

## 2018-07-14 RX ADMIN — INSULIN LISPRO 3 UNITS: 100 INJECTION, SOLUTION INTRAVENOUS; SUBCUTANEOUS at 17:58

## 2018-07-14 RX ADMIN — CHLORHEXIDINE GLUCONATE 10 ML: 1.2 RINSE ORAL at 09:00

## 2018-07-14 RX ADMIN — CHLORHEXIDINE GLUCONATE 10 ML: 1.2 RINSE ORAL at 18:00

## 2018-07-14 RX ADMIN — FAMOTIDINE 20 MG: 20 TABLET ORAL at 09:17

## 2018-07-14 RX ADMIN — Medication 10 ML: at 21:04

## 2018-07-14 RX ADMIN — OXYCODONE HYDROCHLORIDE AND ACETAMINOPHEN 500 MG: 500 TABLET ORAL at 09:17

## 2018-07-14 RX ADMIN — Medication 10 ML: at 04:19

## 2018-07-14 RX ADMIN — INSULIN LISPRO 3 UNITS: 100 INJECTION, SOLUTION INTRAVENOUS; SUBCUTANEOUS at 12:30

## 2018-07-14 RX ADMIN — INSULIN GLARGINE 32 UNITS: 100 INJECTION, SOLUTION SUBCUTANEOUS at 10:00

## 2018-07-14 RX ADMIN — FOLIC ACID 1 MG: 1 TABLET ORAL at 09:18

## 2018-07-14 RX ADMIN — LOSARTAN POTASSIUM 25 MG: 25 TABLET ORAL at 09:17

## 2018-07-14 RX ADMIN — ALBUTEROL SULFATE 2.5 MG: 2.5 SOLUTION RESPIRATORY (INHALATION) at 19:38

## 2018-07-14 RX ADMIN — ATORVASTATIN CALCIUM 20 MG: 20 TABLET, FILM COATED ORAL at 21:04

## 2018-07-14 RX ADMIN — INSULIN LISPRO 2 UNITS: 100 INJECTION, SOLUTION INTRAVENOUS; SUBCUTANEOUS at 07:31

## 2018-07-14 RX ADMIN — METOCLOPRAMIDE 5 MG: 5 INJECTION, SOLUTION INTRAMUSCULAR; INTRAVENOUS at 23:28

## 2018-07-14 RX ADMIN — Medication 10 ML: at 06:47

## 2018-07-14 NOTE — PROGRESS NOTES
Problem: Mobility Impaired (Adult and Pediatric)  Goal: *Acute Goals and Plan of Care (Insert Text)  Physical Therapy Goals    Initiated 07/10/18   1. Patient will move from supine to sit and sit to supine , scoot up and down and roll side to side in bed with modified independence within 5 day(s). 2.  Patient will transfer from bed to chair and chair to bed with modified independence using the least restrictive device within 5 day(s). 3.  Patient will perform sit to stand with modified independence within 5 day(s). 4.  Patient will ambulate with modified independence for 300 feet with the least restrictive device within 5 day(s). 5.  Patient will ascend/descend 12 stairs with one handrail(s) with modified independence within 5 day(s). 6.  Patient will perform cardiac exercises per protocol with independence within 5 days. 7.  Patient will verbally and functionally recall 3/3 sternal precautions within 5 days. physical Therapy TREATMENT  Patient: Jess Flood (69 y.o. male)  Date: 7/14/2018  Diagnosis: CAD   CAD (coronary artery disease) S/P CABG x 3  Procedure(s) (LRB):  CORONARY ARTERY BYPASS GRAFT x 3 with Left Internal Mammary Artery and Right Greater Saphenous Vein - Transesphageal EchoCardiogram and EpiAortic Ultrsound performed by Dr. Simona Elizabeth (N/A) 5 Days Post-Op  Precautions: Fall, Sternal  Chart, physical therapy assessment, plan of care and goals were reviewed. ASSESSMENT:  Chart reviewed, RN cleared patient for mobility, and patient received in chair. Patient today demos fair/poor balance and had significant path deviations when ambulating without DME requiring Mod A x 1 to prevent LOB/fall. This continued for the next 30 ft until patient sat in chair. PT assessed strength in BLE, no deficits noted. With RW patient's gait improved slightly but continued to have LOB and path deviations requiring Min A x 1.  Overall patient continues to be an excellent IPR candidate as patient PTA was independent and would benefit from endurance, gait training, and strengthening to return to PLOF. Patient ended session in chair with all needs placed within reach and RN notified of patient's status. Discharge rec - IPR  Progression toward goals:  [x]    Improving appropriately and progressing toward goals  []    Improving slowly and progressing toward goals  []    Not making progress toward goals and plan of care will be adjusted     PLAN:  Patient continues to benefit from skilled intervention to address the above impairments. Continue treatment per established plan of care. Discharge Recommendations:  Rehab  Further Equipment Recommendations for Discharge:  RW? Pending rehab     SUBJECTIVE:   Patient stated I usually dont do that no.     OBJECTIVE DATA SUMMARY:   Critical Behavior:  Neurologic State: Alert  Orientation Level: Oriented X4  Cognition: Appropriate decision making, Appropriate for age attention/concentration, Appropriate safety awareness  Safety/Judgement: Awareness of environment, Fall prevention  Functional Mobility Training:  Bed Mobility:                    Transfers:  Sit to Stand: Contact guard assistance  Stand to Sit: Contact guard assistance                             Balance:  Sitting: Intact  Standing: Impaired  Standing - Static: Fair  Standing - Dynamic : Fair  Ambulation/Gait Training:  Distance (ft): 160 Feet (ft) (three seated rest breaks)  Assistive Device: Gait belt;Walker, rolling (RW brought after initial LOB, continued to have path dev/LOB)  Ambulation - Level of Assistance: Minimal assistance; Moderate assistance (Mod to prevent LOB)        Gait Abnormalities: Decreased step clearance; Step to gait; Path deviations;Trunk sway increased        Base of Support: Widened  Stance: Weight shift  Speed/Bette: Pace decreased (<100 feet/min); Shuffled  Step Length: Right shortened;Left shortened                    Stairs:              Neuro Re-Education:    Therapeutic Exercises: Reviewed proper form with cardiac exercises (scap elev, ret, shoulder flex, abd) frequency: 3x/day 10 each,     PAIN  Pain Scale 1: Numeric (0 - 10)  Pain Intensity 1: 0              Activity Tolerance:   Good, path deviations and LOB, no adverse signs/symptoms reported   Please refer to the flowsheet for vital signs taken during this treatment.   After treatment:   [x]    Patient left in no apparent distress sitting up in chair  []    Patient left in no apparent distress in bed  [x]    Call bell left within reach  [x]    Nursing notified  []    Caregiver present  []    Bed alarm activated    COMMUNICATION/COLLABORATION:   The patients plan of care was discussed with: Registered Nurse    Arabella Olvera PT, DPT   Time Calculation: 9 mins

## 2018-07-14 NOTE — PROGRESS NOTES
0730 Bedside shift change report given to Samina Nelson RN (oncoming nurse) by Julissa Nation RN (offgoing nurse). Report included the following information SBAR, Kardex, Procedure Summary, Intake/Output, MAR, Recent Results and Med Rec Status. Problem: CABG: Post-Op Day 4  Goal: Activity/Safety  Outcome: Progressing Towards Goal  Patient is free from falls. Goal: Respiratory  Outcome: Progressing Towards Goal  Patient is on 2L O2 via NC  Goal: Psychosocial  Outcome: Progressing Towards Goal  Patient is tolerating hospital stay. Problem: Pressure Injury - Risk of  Goal: *Prevention of pressure injury  Document Mike Scale and appropriate interventions in the flowsheet. Outcome: Progressing Towards Goal  Pressure Injury Interventions:  Sensory Interventions: Assess changes in LOC    Moisture Interventions: Maintain skin hydration (lotion/cream)    Activity Interventions: Increase time out of bed    Mobility Interventions: Pressure redistribution bed/mattress (bed type)    Nutrition Interventions: Document food/fluid/supplement intake    Friction and Shear Interventions: Lift sheet             Problem: Falls - Risk of  Goal: *Absence of Falls  Document Laverne Fall Risk and appropriate interventions in the flowsheet.    Outcome: Progressing Towards Goal  Fall Risk Interventions:  Mobility Interventions: Communicate number of staff needed for ambulation/transfer         Medication Interventions: Evaluate medications/consider consulting pharmacy    Elimination Interventions: Call light in reach

## 2018-07-14 NOTE — PROGRESS NOTES
0715  Bedside shift change report given to Walker Christensen (oncoming nurse) by Katharina Cedeño RN (offgoing nurse). Report included the following information SBAR, ED Summary, OR Summary and Procedure Summary. Problem: CABG: Post-Op Day 5  Goal: Nutrition/Diet  Outcome: Progressing Towards Goal  Patient is tolerating meals. Goal: Respiratory  Outcome: Progressing Towards Goal  Patient is on room air. Problem: Pressure Injury - Risk of  Goal: *Prevention of pressure injury  Document Mike Scale and appropriate interventions in the flowsheet. Outcome: Progressing Towards Goal  Pressure Injury Interventions:  Sensory Interventions: Pressure redistribution bed/mattress (bed type)    Moisture Interventions: Moisture barrier    Activity Interventions: Increase time out of bed    Mobility Interventions: Pressure redistribution bed/mattress (bed type)    Nutrition Interventions: Document food/fluid/supplement intake    Friction and Shear Interventions: Lift sheet       Problem: Falls - Risk of  Goal: *Absence of Falls  Document Laverne Fall Risk and appropriate interventions in the flowsheet.    Outcome: Progressing Towards Goal  Fall Risk Interventions:  Mobility Interventions: Mechanical lift    Mentation Interventions: Increase mobility    Medication Interventions: Teach patient to arise slowly    Elimination Interventions: Patient to call for help with toileting needs

## 2018-07-15 ENCOUNTER — APPOINTMENT (OUTPATIENT)
Dept: GENERAL RADIOLOGY | Age: 68
DRG: 236 | End: 2018-07-15
Attending: NURSE PRACTITIONER
Payer: MEDICARE

## 2018-07-15 LAB
ALBUMIN SERPL-MCNC: 2.5 G/DL (ref 3.5–5)
ALBUMIN/GLOB SERPL: 0.8 {RATIO} (ref 1.1–2.2)
ALP SERPL-CCNC: 130 U/L (ref 45–117)
ALT SERPL-CCNC: 32 U/L (ref 12–78)
ANION GAP SERPL CALC-SCNC: 8 MMOL/L (ref 5–15)
AST SERPL-CCNC: 33 U/L (ref 15–37)
BILIRUB SERPL-MCNC: 0.4 MG/DL (ref 0.2–1)
BUN SERPL-MCNC: 32 MG/DL (ref 6–20)
BUN/CREAT SERPL: 29 (ref 12–20)
CALCIUM SERPL-MCNC: 8 MG/DL (ref 8.5–10.1)
CHLORIDE SERPL-SCNC: 110 MMOL/L (ref 97–108)
CO2 SERPL-SCNC: 25 MMOL/L (ref 21–32)
CREAT SERPL-MCNC: 1.11 MG/DL (ref 0.7–1.3)
ERYTHROCYTE [DISTWIDTH] IN BLOOD BY AUTOMATED COUNT: 20.1 % (ref 11.5–14.5)
GLOBULIN SER CALC-MCNC: 3.1 G/DL (ref 2–4)
GLUCOSE BLD STRIP.AUTO-MCNC: 198 MG/DL (ref 65–100)
GLUCOSE BLD STRIP.AUTO-MCNC: 369 MG/DL (ref 65–100)
GLUCOSE BLD STRIP.AUTO-MCNC: 60 MG/DL (ref 65–100)
GLUCOSE BLD STRIP.AUTO-MCNC: 91 MG/DL (ref 65–100)
GLUCOSE BLD STRIP.AUTO-MCNC: 97 MG/DL (ref 65–100)
GLUCOSE SERPL-MCNC: 75 MG/DL (ref 65–100)
HCT VFR BLD AUTO: 26.2 % (ref 36.6–50.3)
HGB BLD-MCNC: 8 G/DL (ref 12.1–17)
MAGNESIUM SERPL-MCNC: 2.4 MG/DL (ref 1.6–2.4)
MCH RBC QN AUTO: 25.2 PG (ref 26–34)
MCHC RBC AUTO-ENTMCNC: 30.5 G/DL (ref 30–36.5)
MCV RBC AUTO: 82.4 FL (ref 80–99)
NRBC # BLD: 0.03 K/UL (ref 0–0.01)
NRBC BLD-RTO: 0.4 PER 100 WBC
PLATELET # BLD AUTO: 203 K/UL (ref 150–400)
PMV BLD AUTO: 11.2 FL (ref 8.9–12.9)
POTASSIUM SERPL-SCNC: 3.7 MMOL/L (ref 3.5–5.1)
PROT SERPL-MCNC: 5.6 G/DL (ref 6.4–8.2)
RBC # BLD AUTO: 3.18 M/UL (ref 4.1–5.7)
SERVICE CMNT-IMP: ABNORMAL
SERVICE CMNT-IMP: NORMAL
SERVICE CMNT-IMP: NORMAL
SODIUM SERPL-SCNC: 143 MMOL/L (ref 136–145)
WBC # BLD AUTO: 6.9 K/UL (ref 4.1–11.1)

## 2018-07-15 PROCEDURE — 97116 GAIT TRAINING THERAPY: CPT

## 2018-07-15 PROCEDURE — 85027 COMPLETE CBC AUTOMATED: CPT | Performed by: NURSE PRACTITIONER

## 2018-07-15 PROCEDURE — 74011636637 HC RX REV CODE- 636/637: Performed by: NURSE PRACTITIONER

## 2018-07-15 PROCEDURE — 36415 COLL VENOUS BLD VENIPUNCTURE: CPT | Performed by: NURSE PRACTITIONER

## 2018-07-15 PROCEDURE — 74011250637 HC RX REV CODE- 250/637: Performed by: THORACIC SURGERY (CARDIOTHORACIC VASCULAR SURGERY)

## 2018-07-15 PROCEDURE — 83735 ASSAY OF MAGNESIUM: CPT | Performed by: NURSE PRACTITIONER

## 2018-07-15 PROCEDURE — 74011250637 HC RX REV CODE- 250/637: Performed by: NURSE PRACTITIONER

## 2018-07-15 PROCEDURE — 82962 GLUCOSE BLOOD TEST: CPT

## 2018-07-15 PROCEDURE — 94760 N-INVAS EAR/PLS OXIMETRY 1: CPT

## 2018-07-15 PROCEDURE — 74018 RADEX ABDOMEN 1 VIEW: CPT

## 2018-07-15 PROCEDURE — 80053 COMPREHEN METABOLIC PANEL: CPT | Performed by: NURSE PRACTITIONER

## 2018-07-15 PROCEDURE — 65660000000 HC RM CCU STEPDOWN

## 2018-07-15 PROCEDURE — 77010033678 HC OXYGEN DAILY

## 2018-07-15 PROCEDURE — 94640 AIRWAY INHALATION TREATMENT: CPT

## 2018-07-15 PROCEDURE — 74011250636 HC RX REV CODE- 250/636: Performed by: NURSE PRACTITIONER

## 2018-07-15 PROCEDURE — 74011000250 HC RX REV CODE- 250: Performed by: NURSE PRACTITIONER

## 2018-07-15 RX ORDER — LOSARTAN POTASSIUM 50 MG/1
50 TABLET ORAL DAILY
Status: DISCONTINUED | OUTPATIENT
Start: 2018-07-15 | End: 2018-07-17

## 2018-07-15 RX ORDER — AMOXICILLIN 250 MG
1 CAPSULE ORAL
Status: DISCONTINUED | OUTPATIENT
Start: 2018-07-15 | End: 2018-07-18 | Stop reason: HOSPADM

## 2018-07-15 RX ADMIN — ASPIRIN 81 MG 81 MG: 81 TABLET ORAL at 09:20

## 2018-07-15 RX ADMIN — OXYCODONE HYDROCHLORIDE AND ACETAMINOPHEN 500 MG: 500 TABLET ORAL at 09:20

## 2018-07-15 RX ADMIN — INSULIN LISPRO 3 UNITS: 100 INJECTION, SOLUTION INTRAVENOUS; SUBCUTANEOUS at 11:30

## 2018-07-15 RX ADMIN — Medication 400 MG: at 17:53

## 2018-07-15 RX ADMIN — INSULIN GLARGINE 32 UNITS: 100 INJECTION, SOLUTION SUBCUTANEOUS at 09:21

## 2018-07-15 RX ADMIN — Medication 400 MG: at 09:20

## 2018-07-15 RX ADMIN — CHLORHEXIDINE GLUCONATE 10 ML: 1.2 RINSE ORAL at 09:00

## 2018-07-15 RX ADMIN — SENNOSIDES AND DOCUSATE SODIUM 1 TABLET: 8.6; 5 TABLET ORAL at 09:00

## 2018-07-15 RX ADMIN — TAMSULOSIN HYDROCHLORIDE 0.4 MG: 0.4 CAPSULE ORAL at 09:20

## 2018-07-15 RX ADMIN — FAMOTIDINE 20 MG: 20 TABLET ORAL at 09:20

## 2018-07-15 RX ADMIN — BUDESONIDE 500 MCG: 0.5 INHALANT RESPIRATORY (INHALATION) at 10:04

## 2018-07-15 RX ADMIN — ALBUTEROL SULFATE 2.5 MG: 2.5 SOLUTION RESPIRATORY (INHALATION) at 10:04

## 2018-07-15 RX ADMIN — Medication 500 MCG: at 09:00

## 2018-07-15 RX ADMIN — ATORVASTATIN CALCIUM 20 MG: 20 TABLET, FILM COATED ORAL at 21:39

## 2018-07-15 RX ADMIN — INSULIN LISPRO 10 UNITS: 100 INJECTION, SOLUTION INTRAVENOUS; SUBCUTANEOUS at 11:30

## 2018-07-15 RX ADMIN — Medication 10 ML: at 14:00

## 2018-07-15 RX ADMIN — INSULIN LISPRO 3 UNITS: 100 INJECTION, SOLUTION INTRAVENOUS; SUBCUTANEOUS at 09:21

## 2018-07-15 RX ADMIN — Medication 10 ML: at 05:26

## 2018-07-15 RX ADMIN — METOPROLOL TARTRATE 50 MG: 50 TABLET ORAL at 21:39

## 2018-07-15 RX ADMIN — FUROSEMIDE 40 MG: 40 TABLET ORAL at 09:21

## 2018-07-15 RX ADMIN — LOSARTAN POTASSIUM 50 MG: 50 TABLET ORAL at 09:20

## 2018-07-15 RX ADMIN — Medication 10 ML: at 21:39

## 2018-07-15 RX ADMIN — FOLIC ACID 1 MG: 1 TABLET ORAL at 09:21

## 2018-07-15 RX ADMIN — METOPROLOL TARTRATE 50 MG: 50 TABLET ORAL at 09:21

## 2018-07-15 RX ADMIN — ALBUTEROL SULFATE 2.5 MG: 2.5 SOLUTION RESPIRATORY (INHALATION) at 19:42

## 2018-07-15 RX ADMIN — ENOXAPARIN SODIUM 40 MG: 100 INJECTION SUBCUTANEOUS at 16:00

## 2018-07-15 RX ADMIN — METOCLOPRAMIDE 5 MG: 5 INJECTION, SOLUTION INTRAMUSCULAR; INTRAVENOUS at 05:26

## 2018-07-15 RX ADMIN — OXYCODONE HYDROCHLORIDE 10 MG: 5 TABLET ORAL at 21:39

## 2018-07-15 RX ADMIN — BUDESONIDE 500 MCG: 0.5 INHALANT RESPIRATORY (INHALATION) at 19:42

## 2018-07-15 NOTE — PROGRESS NOTES
Problem: Mobility Impaired (Adult and Pediatric)  Goal: *Acute Goals and Plan of Care (Insert Text)  Physical Therapy Goals    Weekly reassessment completed 7/15/18. Goals appropriate for carryover, progressing well towards all. Initiated 07/10/18   1. Patient will move from supine to sit and sit to supine , scoot up and down and roll side to side in bed with modified independence within 5 day(s). 2.  Patient will transfer from bed to chair and chair to bed with modified independence using the least restrictive device within 5 day(s). 3.  Patient will perform sit to stand with modified independence within 5 day(s). 4.  Patient will ambulate with modified independence for 300 feet with the least restrictive device within 5 day(s). 5.  Patient will ascend/descend 12 stairs with one handrail(s) with modified independence within 5 day(s). 6.  Patient will perform cardiac exercises per protocol with independence within 5 days. 7.  Patient will verbally and functionally recall 3/3 sternal precautions within 5 days. physical Therapy TREATMENT, Weekly reassessment  Patient: Wade Boyd (69 y.o. male)  Date: 7/15/2018  Diagnosis: CAD   CAD (coronary artery disease) S/P CABG x 3  Procedure(s) (LRB):  CORONARY ARTERY BYPASS GRAFT x 3 with Left Internal Mammary Artery and Right Greater Saphenous Vein - Transesphageal EchoCardiogram and EpiAortic Ultrsound performed by Dr. Maya Estrada (N/A) 6 Days Post-Op  Precautions: Fall, Sternal  Chart, physical therapy assessment, plan of care and goals were reviewed. ASSESSMENT:  Cleared for mobility by RN. Received supine in bed (just returned to bed), motivated and agreeable to participation in session. Vitals assessed - systolic BP 308J at rest and decreased to 140s in standing position. Pt denies any light headedness or dizziness. Reviewed sternal precautions with pt - able to recall all 3.  He was able to mobilize to EOB with CGA (nearly flat HOB) and increased time, excellent ability to maintain precautions. Completed sit<>stands with CGA for safety, no overt LOB or significant postural sway on initial stance. Gait training advanced in hallway w/o AD this date - note NBOS, decreased jack, and decreased step lengths, +mild LOBs with distraction or integration of head movements requiring min A to recover from. Required brief standing rest break 2* SOB, however SpO2 and HR stable. Reviewed cardiac there ex, pt to complete on own. Assisted pt back to bed at end of session per request (has been up all morning). He appears to be progressing well towards goals, however remains limited by decreased tolerance to sustained activity, generalized decreased strength, sternal precautions, impaired gait, impaired balance, and mild anxiety with movement limiting independence with mobility as compared to baseline. Recommending discharge to rehab setting vs home with family assist and HHPT pending progress. Progression toward goals:  []      Improving appropriately and progressing toward goals  [x]      Improving slowly and progressing toward goals  []      Not making progress toward goals and plan of care will be adjusted     PLAN:  Patient continues to benefit from skilled intervention to address the above impairments. Continue treatment per established plan of care. Discharge Recommendations:  Rehab vs Home Health pending progress  Further Equipment Recommendations for Discharge:  TBD     SUBJECTIVE:   Patient stated I'm ready for the soccer match.    The patient stated 3/3 sternal precautions. Reviewed all 3 with patient. OBJECTIVE DATA SUMMARY:   Patient mobilized on continuous portable monitor/telemetry.   Critical Behavior:  Neurologic State: Alert, Appropriate for age  Orientation Level: Oriented X4  Cognition: Appropriate decision making, Appropriate for age attention/concentration, Appropriate safety awareness, Follows commands  Safety/Judgement: Awareness of environment, Fall prevention  Functional Mobility Training:  Bed Mobility:  Log    Supine to Sit: Contact guard assistance  Sit to Supine: Contact guard assistance     Transfers:  Sit to Stand: Contact guard assistance  Stand to Sit: Contact guard assistance     Balance:  Sitting: Intact  Standing: Impaired  Standing - Static: Good  Standing - Dynamic : Fair  Ambulation/Gait Training:  Distance (ft): 150 Feet (ft) (brief standing rest break)  Assistive Device: Gait belt  Ambulation - Level of Assistance: Contact guard assistance  Gait Abnormalities: Decreased step clearance;Trunk sway increased  Base of Support: Narrowed  Speed/Bette: Slow  Step Length: Left shortened;Right shortened       Tinetti test:    Sitting Balance: 1  Arises: 2  Attempts to Rise: 2  Immediate Standing Balance: 2  Standing Balance: 1  Nudged: 0  Eyes Closed: 0  Turn 360 Degrees - Continuous/Discontinuous: 0  Turn 360 Degrees - Steady/Unsteady: 0  Sitting Down: 2  Balance Score: 10  Indication of Gait: 1  R Step Length/Height: 1  L Step Length/Height: 1  R Foot Clearance: 1  L Foot Clearance: 1  Step Symmetry: 1  Step Continuity: 1  Path: 1  Trunk: 1  Walking Time: 1  Gait Score: 10  Total Score: 20       Tinetti Test and G-code impairment scale:  Percentage of Impairment CH    0%   CI    1-19% CJ    20-39% CK    40-59% CL    60-79% CM    80-99% CN     100%   Tinetti  Score 0-28 28 23-27 17-22 12-16 6-11 1-5 0       Tinetti Tool Score Risk of Falls  <19 = High Fall Risk  19-24 = Moderate Fall Risk  25-28 = Low Fall Risk  Tinetti ME. Performance-Oriented Assessment of Mobility Problems in Elderly Patients. Day 66; [id number].  (Scoring Description: PT Bulletin Feb. 10, 1993)    Older adults: Delayne Schlatter et al, 2009; n = 1000 [facility] elderly evaluated with ABC, CROW, ADL, and IADL)  · Mean CROW score for males aged 69-68 years = 26.21(3.40)  · Mean CROW score for females age 69-68 years = 25.16(4.30)  · Mean CROW score for males over 80 years = 23.29(6.02)  · Mean CROW score for females over 80 years = 17.20(8.32)         Therapeutic Exercises:   Patient instructed on the benefits and demonstrated cardiac exercises. Instructed and indicated understanding on how to progress reps and sets against gravity, working up to 5 lbs and so on based on surgeon clearance for more weight in prep for functional activity. Can use household items for weights.      CARDIAC  EXERCISE   Sets   Reps   Active Active Assist   Passive Self ROM   Comments   Shoulder flexion 1 5 [x]                                            []                                            []                                            []                                               Shoulder abduction 1  5 []                                            []                                            []                                            []                                               Scapular elevation 1 5 [x]                                            []                                            []                                            []                                               Scapular retraction 1 5 []                                            []                                            []                                            []                                               Trunk rotation 1 5 []                                            []                                            []                                            []                                               Trunk sidebending 1 5 []                                            []                                            []                                            []                                                  []                                            []                                            []                                            []                                                 Pain:  Pain Scale 1: Numeric (0 - 10)  Pain Intensity 1: 0              Activity Tolerance:   NAD    Please refer to the flowsheet for vital signs taken during this treatment.   After treatment:   [] Patient left in no apparent distress sitting up in chair  [x] Patient left in no apparent distress in bed  [x] Call bell left within reach  [x] Nursing notified  [] Caregiver present  [] Bed alarm activated    COMMUNICATION/COLLABORATION:   The patients plan of care was discussed with: Registered Nurse    Divine Riley PT, DPT   Time Calculation: 19 mins

## 2018-07-15 NOTE — PROGRESS NOTES
1930: Bedside and Verbal shift change report given to LORI Braun (oncoming nurse) by Tori Hill RN (offgoing nurse). Report included the following information SBAR, Kardex, Intake/Output, MAR and Recent Results. 0700: Patient had uneventful night. Patient up to chair and bathed with CHG. Gown and linen changed. 0730: Bedside and Verbal shift change report given to Tori Hill RN (oncoming nurse) by Lilli Mason RN (offgoing nurse). Report included the following information SBAR, Kardex, Intake/Output, MAR and Recent Results.

## 2018-07-15 NOTE — PROGRESS NOTES
CSS Progress Note    Admit Date: 2018  POD:  6 Day Post-Op    Procedure:  Procedure(s):  CORONARY ARTERY BYPASS GRAFT x 3 with Left Internal Mammary Artery and Right Greater Saphenous Vein - Transesphageal EchoCardiogram and EpiAortic Ultrsound performed by Dr. Estela Doyle        Subjective:   Pt seen with Dr. Genaro Matos, pt sitting up in chair, looks well today - pt stating he feels better as well. +BM - stools looser today     Objective:   Vitals:  Blood pressure 161/60, pulse 92, temperature 98 °F (36.7 °C), resp. rate 18, height 5' 3\" (1.6 m), weight 141 lb 8.6 oz (64.2 kg), SpO2 98 %. Temp (24hrs), Av °F (36.7 °C), Min:97.7 °F (36.5 °C), Max:98.4 °F (36.9 °C)    EKG/Rhythm: SR     Oxygen Therapy:  Oxygen Therapy  O2 Sat (%): 98 % (07/15/18 0818)  Pulse via Oximetry: 95 beats per minute (18 1938)  O2 Device: Room air (07/15/18 0358)  O2 Flow Rate (L/min): 1 l/min (18 0726)  FIO2 (%): 40 % (18 1214)    CXR:   CXR Results  (Last 48 hours)               18 1632  XR CHEST PA LAT Final result    Impression:  IMPRESSION:    Left lower lobe atelectasis or infiltrate with pleural effusion, worsened since   prior study 2018. .  . Small right pleural effusion, new. Narrative:  INDICATION:  postop heart - pt may travel without RN. EXAM: 2 VIEW CHEST RADIOGRAPH. COMPARISON: 2018, 11/10/2016. FINDINGS: Frontal and lateral views of the chest show persistent, minimally   worsened left lower lobe atelectasis or infiltrate with pleural effusion. There   is a small right pleural effusion as well, possibly new. Interstitial markings   are mildly prominent but stable. . . The heart, mediastinum and pulmonary   vasculature are stable status post median sternotomy. .  The bony thorax is   unremarkable for age.  ..                   Admission Weight: Last Weight   Weight: 143 lb (64.9 kg) Weight: 141 lb 8.6 oz (64.2 kg)     Intake / Output / Drain:  Current Shift:    Last 24 hrs.: Intake/Output Summary (Last 24 hours) at 07/15/18 0934  Last data filed at 07/15/18 0406   Gross per 24 hour   Intake              540 ml   Output              700 ml   Net             -160 ml       EXAM:  General:  No obvious distress                                                                                        Lungs:   Clear to auscultation bilaterally, diminished in bases. Incision:  No erythema, drainage or swelling   Heart:  Regular rate and rhythm, S1, S2 normal, no murmur, click, rub or gallop. Abdomen:   Softer, no distention. Bowel sounds active. No masses,  No organomegaly. Extremities:  No edema. PPP. Neurologic:  Gross motor and sensory apparatus intact. Labs:   Recent Labs      07/15/18   0703  07/15/18   0403   WBC   --   6.9   HGB   --   8.0*   HCT   --   26.2*   PLT   --   203   NA   --   143   K   --   3.7   BUN   --   32*   CREA   --   1.11   GLU   --   75   GLUCPOC  97   --         Assessment:     Principal Problem:    S/P CABG x 3 (2018)      Overview: Coronary Artery Bypass Grafting x 3, LIMA to LAD, RSVG to OM, RSVG to RCA      Right GSV EVH    Active Problems:    CAD, multiple vessel (2018)      CAD (coronary artery disease) (2018)         Plan/Recommendations/Medical Decision Makin. S/p CABG x 3:  On asa, statin, and BB. 2. HTN: Cont BB, losartan - increase. PRN hydralazine, Monitor   3. Atelectasis/wheezing: on RA. Increase IS and activity as tolerated. Cont PO lasix. Cont albuterol q6h nebs, steroid nebs. 4. DM: Hgba1c 7.4. DTC consult. Resumed [place] and [place]. Resume cardiac diet consistent carb -- NO JUICE. Cont BS ACHS, SSI. Need to resume metformin/glipizide when appropriate. Lantus added 18 - cont 32 units, increase mealtime insulin  5. Acute anemia s/t post op blood loss & iron deficiency/B12 def: Recently had EGD/colonscopy after discovery of anemia during hospitalization. Showed gastritis. Cont pepcid. Completed IV iron.  Cont PO K36, folic acid and Vit C(caution d/t renal insuff). Monitor H/H. Cont epogen 3x/week. 6. Hyperlipidemia: cont statin. 7. Renal insufficiency: Cr improving, cont current meds for now but if rise again will need to adjust.  Resumed ARB -- watch closely! Monitor. Avoid nephrotoxic meds. Known recently to have hyperkalemia on outpt labs. K stable today. 8. Postop urinary retention:  Vasquez removed 7/13/18, pt reports he is voiding. On flomax. Cont lasix. 9. Distended abd/postop ileus: Repeat KUB revealing nonobstructive gas pattern, abd less distended, soft, +BM. Cont reglan, miralax, switch pericolace to prn - pt request due to loose stools. MOBILIZE. Limit narcotics. 10.  Dispo: PT/OT. Needs inpt rehab--pending.       Signed By: Heriberto Carranza NP

## 2018-07-15 NOTE — PROGRESS NOTES
ADULT PROTOCOL: JET AEROSOL  REASSESSMENT    Patient  Raegan Bay     78 y.o.   male     7/14/2018  9:25 PM    Breath Sounds Pre Procedure: Right Breath Sounds: Clear, Diminished                               Left Breath Sounds: Clear, Diminished    Breath Sounds Post Procedure:                                        Breathing pattern: Pre procedure Breathing Pattern: Regular          Post procedure Breathing Pattern: Regular    Heart Rate: Pre procedure Pulse: 95           Post procedure Pulse: 95    Resp Rate: Pre procedure Respirations: 18           Post procedure Respirations: 20      Incentive Spirometry:  Actual Volume (ml): 750 ml          Suctioned:    Sputum: Pre procedure                   Post procedure      Oxygen: O2 Device: Room air        Changed:     SpO2: Pre procedure SpO2: 97 %               Post procedure SpO2: 98 %      Nebulizer Therapy: Current medications Albuterol Every 6 hours      Changed: Albuterol 2 times daily    Smoking History: no    Problem List:   Patient Active Problem List   Diagnosis Code    Dyslipidemia, goal LDL below 100 E78.5    Cramp of limb R25.2    Deafness H91.90    Essential hypertension with goal blood pressure less than 130/85 I10    Type 2 diabetes mellitus with hyperglycemia (HCC) E11.65    Nonrheumatic aortic valve stenosis I35.0    Bruit of right carotid artery R09.89    Preop general physical exam Z01.818    Colon cancer screening Z12.11    Dyspnea on exertion R06.09    Coronary artery disease involving native coronary artery of native heart without angina pectoris I25.10    Hypercholesteremia E78.00    Hypertension, essential I10    Statin intolerance Z78.9    NSTEMI (non-ST elevated myocardial infarction) (HCC) I21.4    CAD, multiple vessel I25.10    CAD (coronary artery disease) I25.10    S/P CABG x 3 Z95.1       Respiratory Therapist: Toma Gomez, RT

## 2018-07-16 LAB
ALBUMIN SERPL-MCNC: 2.5 G/DL (ref 3.5–5)
ALBUMIN/GLOB SERPL: 0.8 {RATIO} (ref 1.1–2.2)
ALP SERPL-CCNC: 168 U/L (ref 45–117)
ALT SERPL-CCNC: 41 U/L (ref 12–78)
ANION GAP SERPL CALC-SCNC: 7 MMOL/L (ref 5–15)
AST SERPL-CCNC: 37 U/L (ref 15–37)
BILIRUB SERPL-MCNC: 0.4 MG/DL (ref 0.2–1)
BUN SERPL-MCNC: 22 MG/DL (ref 6–20)
BUN/CREAT SERPL: 20 (ref 12–20)
CALCIUM SERPL-MCNC: 8.2 MG/DL (ref 8.5–10.1)
CHLORIDE SERPL-SCNC: 110 MMOL/L (ref 97–108)
CO2 SERPL-SCNC: 26 MMOL/L (ref 21–32)
CREAT SERPL-MCNC: 1.11 MG/DL (ref 0.7–1.3)
ERYTHROCYTE [DISTWIDTH] IN BLOOD BY AUTOMATED COUNT: 21.6 % (ref 11.5–14.5)
GLOBULIN SER CALC-MCNC: 3.3 G/DL (ref 2–4)
GLUCOSE BLD STRIP.AUTO-MCNC: 189 MG/DL (ref 65–100)
GLUCOSE BLD STRIP.AUTO-MCNC: 291 MG/DL (ref 65–100)
GLUCOSE BLD STRIP.AUTO-MCNC: 292 MG/DL (ref 65–100)
GLUCOSE BLD STRIP.AUTO-MCNC: 96 MG/DL (ref 65–100)
GLUCOSE SERPL-MCNC: 89 MG/DL (ref 65–100)
HCT VFR BLD AUTO: 26.6 % (ref 36.6–50.3)
HGB BLD-MCNC: 8 G/DL (ref 12.1–17)
MAGNESIUM SERPL-MCNC: 2.1 MG/DL (ref 1.6–2.4)
MCH RBC QN AUTO: 25.3 PG (ref 26–34)
MCHC RBC AUTO-ENTMCNC: 30.1 G/DL (ref 30–36.5)
MCV RBC AUTO: 84.2 FL (ref 80–99)
NRBC # BLD: 0 K/UL (ref 0–0.01)
NRBC BLD-RTO: 0 PER 100 WBC
PLATELET # BLD AUTO: 213 K/UL (ref 150–400)
PMV BLD AUTO: 10.9 FL (ref 8.9–12.9)
POTASSIUM SERPL-SCNC: 4 MMOL/L (ref 3.5–5.1)
PROT SERPL-MCNC: 5.8 G/DL (ref 6.4–8.2)
RBC # BLD AUTO: 3.16 M/UL (ref 4.1–5.7)
SERVICE CMNT-IMP: ABNORMAL
SERVICE CMNT-IMP: NORMAL
SODIUM SERPL-SCNC: 143 MMOL/L (ref 136–145)
WBC # BLD AUTO: 7.6 K/UL (ref 4.1–11.1)

## 2018-07-16 PROCEDURE — 97116 GAIT TRAINING THERAPY: CPT

## 2018-07-16 PROCEDURE — 65660000000 HC RM CCU STEPDOWN

## 2018-07-16 PROCEDURE — 83735 ASSAY OF MAGNESIUM: CPT | Performed by: NURSE PRACTITIONER

## 2018-07-16 PROCEDURE — 74011250637 HC RX REV CODE- 250/637: Performed by: THORACIC SURGERY (CARDIOTHORACIC VASCULAR SURGERY)

## 2018-07-16 PROCEDURE — 94760 N-INVAS EAR/PLS OXIMETRY 1: CPT

## 2018-07-16 PROCEDURE — 82962 GLUCOSE BLOOD TEST: CPT

## 2018-07-16 PROCEDURE — 74011636637 HC RX REV CODE- 636/637: Performed by: NURSE PRACTITIONER

## 2018-07-16 PROCEDURE — 36415 COLL VENOUS BLD VENIPUNCTURE: CPT | Performed by: NURSE PRACTITIONER

## 2018-07-16 PROCEDURE — 80053 COMPREHEN METABOLIC PANEL: CPT | Performed by: NURSE PRACTITIONER

## 2018-07-16 PROCEDURE — 74011250637 HC RX REV CODE- 250/637: Performed by: NURSE PRACTITIONER

## 2018-07-16 PROCEDURE — 97535 SELF CARE MNGMENT TRAINING: CPT

## 2018-07-16 PROCEDURE — 74011250636 HC RX REV CODE- 250/636: Performed by: NURSE PRACTITIONER

## 2018-07-16 PROCEDURE — 74011000250 HC RX REV CODE- 250: Performed by: NURSE PRACTITIONER

## 2018-07-16 PROCEDURE — 94640 AIRWAY INHALATION TREATMENT: CPT

## 2018-07-16 PROCEDURE — 85027 COMPLETE CBC AUTOMATED: CPT | Performed by: NURSE PRACTITIONER

## 2018-07-16 RX ORDER — ZOLPIDEM TARTRATE 5 MG/1
5 TABLET ORAL
Status: DISCONTINUED | OUTPATIENT
Start: 2018-07-16 | End: 2018-07-18 | Stop reason: HOSPADM

## 2018-07-16 RX ORDER — GLIPIZIDE 5 MG/1
5 TABLET ORAL
Status: DISCONTINUED | OUTPATIENT
Start: 2018-07-17 | End: 2018-07-17

## 2018-07-16 RX ORDER — INSULIN GLARGINE 100 [IU]/ML
25 INJECTION, SOLUTION SUBCUTANEOUS DAILY
Status: DISCONTINUED | OUTPATIENT
Start: 2018-07-17 | End: 2018-07-18 | Stop reason: HOSPADM

## 2018-07-16 RX ORDER — METFORMIN HYDROCHLORIDE 500 MG/1
500 TABLET ORAL 2 TIMES DAILY WITH MEALS
Status: DISCONTINUED | OUTPATIENT
Start: 2018-07-16 | End: 2018-07-16

## 2018-07-16 RX ORDER — BENZONATATE 100 MG/1
200 CAPSULE ORAL
Status: DISCONTINUED | OUTPATIENT
Start: 2018-07-16 | End: 2018-07-18 | Stop reason: HOSPADM

## 2018-07-16 RX ADMIN — Medication 10 ML: at 21:44

## 2018-07-16 RX ADMIN — CHLORHEXIDINE GLUCONATE 10 ML: 1.2 RINSE ORAL at 09:00

## 2018-07-16 RX ADMIN — METOPROLOL TARTRATE 50 MG: 50 TABLET ORAL at 08:34

## 2018-07-16 RX ADMIN — TAMSULOSIN HYDROCHLORIDE 0.4 MG: 0.4 CAPSULE ORAL at 08:34

## 2018-07-16 RX ADMIN — INSULIN LISPRO 3 UNITS: 100 INJECTION, SOLUTION INTRAVENOUS; SUBCUTANEOUS at 17:09

## 2018-07-16 RX ADMIN — ACETAMINOPHEN 1000 MG: 10 INJECTION, SOLUTION INTRAVENOUS at 04:22

## 2018-07-16 RX ADMIN — BUDESONIDE 500 MCG: 0.5 INHALANT RESPIRATORY (INHALATION) at 21:17

## 2018-07-16 RX ADMIN — Medication 10 ML: at 06:51

## 2018-07-16 RX ADMIN — ACETAMINOPHEN 1000 MG: 10 INJECTION, SOLUTION INTRAVENOUS at 21:44

## 2018-07-16 RX ADMIN — FAMOTIDINE 20 MG: 20 TABLET ORAL at 08:35

## 2018-07-16 RX ADMIN — FUROSEMIDE 40 MG: 40 TABLET ORAL at 08:34

## 2018-07-16 RX ADMIN — EPOETIN ALFA 6000 UNITS: 4000 SOLUTION INTRAVENOUS; SUBCUTANEOUS at 19:42

## 2018-07-16 RX ADMIN — ARFORMOTEROL TARTRATE 15 MCG: 15 SOLUTION RESPIRATORY (INHALATION) at 21:17

## 2018-07-16 RX ADMIN — Medication 400 MG: at 08:34

## 2018-07-16 RX ADMIN — Medication 500 MCG: at 08:35

## 2018-07-16 RX ADMIN — INSULIN LISPRO 5 UNITS: 100 INJECTION, SOLUTION INTRAVENOUS; SUBCUTANEOUS at 12:14

## 2018-07-16 RX ADMIN — INSULIN GLARGINE 32 UNITS: 100 INJECTION, SOLUTION SUBCUTANEOUS at 08:33

## 2018-07-16 RX ADMIN — INSULIN LISPRO 5 UNITS: 100 INJECTION, SOLUTION INTRAVENOUS; SUBCUTANEOUS at 17:09

## 2018-07-16 RX ADMIN — ALBUTEROL SULFATE 2.5 MG: 2.5 SOLUTION RESPIRATORY (INHALATION) at 09:15

## 2018-07-16 RX ADMIN — LOSARTAN POTASSIUM 50 MG: 50 TABLET ORAL at 08:34

## 2018-07-16 RX ADMIN — INSULIN LISPRO 3 UNITS: 100 INJECTION, SOLUTION INTRAVENOUS; SUBCUTANEOUS at 08:33

## 2018-07-16 RX ADMIN — ATORVASTATIN CALCIUM 20 MG: 20 TABLET, FILM COATED ORAL at 21:44

## 2018-07-16 RX ADMIN — SITAGLIPTIN 50 MG: 25 TABLET, FILM COATED ORAL at 12:15

## 2018-07-16 RX ADMIN — BUDESONIDE 500 MCG: 0.5 INHALANT RESPIRATORY (INHALATION) at 09:13

## 2018-07-16 RX ADMIN — METOPROLOL TARTRATE 50 MG: 50 TABLET ORAL at 21:44

## 2018-07-16 RX ADMIN — Medication 10 ML: at 15:06

## 2018-07-16 RX ADMIN — ENOXAPARIN SODIUM 40 MG: 100 INJECTION SUBCUTANEOUS at 15:05

## 2018-07-16 RX ADMIN — ZOLPIDEM TARTRATE 5 MG: 5 TABLET ORAL at 21:44

## 2018-07-16 RX ADMIN — FOLIC ACID 1 MG: 1 TABLET ORAL at 08:34

## 2018-07-16 RX ADMIN — Medication 400 MG: at 17:10

## 2018-07-16 RX ADMIN — OXYCODONE HYDROCHLORIDE AND ACETAMINOPHEN 500 MG: 500 TABLET ORAL at 08:34

## 2018-07-16 RX ADMIN — BENZONATATE 200 MG: 100 CAPSULE ORAL at 00:46

## 2018-07-16 RX ADMIN — BENZONATATE 200 MG: 100 CAPSULE ORAL at 19:41

## 2018-07-16 RX ADMIN — POLYETHYLENE GLYCOL 3350 17 G: 17 POWDER, FOR SOLUTION ORAL at 09:00

## 2018-07-16 RX ADMIN — ALBUTEROL SULFATE 2.5 MG: 2.5 SOLUTION RESPIRATORY (INHALATION) at 21:19

## 2018-07-16 RX ADMIN — INSULIN LISPRO 3 UNITS: 100 INJECTION, SOLUTION INTRAVENOUS; SUBCUTANEOUS at 12:15

## 2018-07-16 RX ADMIN — ASPIRIN 81 MG 81 MG: 81 TABLET ORAL at 08:34

## 2018-07-16 NOTE — PROGRESS NOTES
Cardiac Surgery Care Coordinator- Met with Wade Boyd, reviewed plan of care and discussed potential discharge date. Reinforced sternal precautions and encouraged continued use of the incentive spirometer. Reviewed goals for the day and emphasized the importance of increased activity to meet discharge goals. Red reminder bracelet on right wrist, reviewed purpose of the bracelet and when to call the MD. Will continue to follow for educational and emotional needs.  Jo Pena RN

## 2018-07-16 NOTE — PROGRESS NOTES
CM received phone call from Giovani Lindsey with [address]; patient's insurance denied authorization. CM informed NP, peer to peer declined. Patient to continue to work towards home health. CM to inform patient and family.      Marv Mullen MS

## 2018-07-16 NOTE — PROGRESS NOTES
Problem: Mobility Impaired (Adult and Pediatric)  Goal: *Acute Goals and Plan of Care (Insert Text)  Physical Therapy Goals    Weekly reassessment completed 7/15/18. Goals appropriate for carryover, progressing well towards all. Initiated 07/10/18   1. Patient will move from supine to sit and sit to supine , scoot up and down and roll side to side in bed with modified independence within 5 day(s). 2.  Patient will transfer from bed to chair and chair to bed with modified independence using the least restrictive device within 5 day(s). 3.  Patient will perform sit to stand with modified independence within 5 day(s). 4.  Patient will ambulate with modified independence for 300 feet with the least restrictive device within 5 day(s). 5.  Patient will ascend/descend 12 stairs with one handrail(s) with modified independence within 5 day(s). 6.  Patient will perform cardiac exercises per protocol with independence within 5 days. 7.  Patient will verbally and functionally recall 3/3 sternal precautions within 5 days. physical Therapy TREATMENT  Patient: Truman Prakash (69 y.o. male)  Date: 7/16/2018  Diagnosis: CAD   CAD (coronary artery disease) S/P CABG x 3  Procedure(s) (LRB):  CORONARY ARTERY BYPASS GRAFT x 3 with Left Internal Mammary Artery and Right Greater Saphenous Vein - Transesphageal EchoCardiogram and EpiAortic Ultrsound performed by Dr. Surjit Cervantes (N/A) 7 Days Post-Op  Precautions: Fall, Sternal  Chart, physical therapy assessment, plan of care and goals were reviewed. ASSESSMENT:  Pt received sitting up in armless chair at [facility] w/ family member. Pt agreeable to PT, reports he had been up and amb in hallway at least 3x prior to therapy arrival. Pt amb 150 FT this session w/o AD, Continue to note path deviations and scissor-like gait (no major LOB this session but gait mild-moderately unsteady). Pt reports amb at increased pace at baseline compared to pace he has been amb this session.  Pt returned to chair at FOB (as was received), completed cardiac UE exercises w/ Supervision and remained in chair w/call bell in reach. Pt able to recall 3/3 sternal precautions and demonstrated adherence during session (pt family member present in room reports pt using arms to push). Pt w/ all needs in reach and met. PT to continue to follow. May benefit from Rehab to address gait deficits above for improvement w/ safety and stability. Progression toward goals:  [x]      Improving appropriately and progressing toward goals  []      Improving slowly and progressing toward goals  []      Not making progress toward goals and plan of care will be adjusted     PLAN:  Patient continues to benefit from skilled intervention to address the above impairments. Continue treatment per established plan of care. Discharge Recommendations:  Rehab vs Home health  Further Equipment Recommendations for Discharge:  TBD vs none     SUBJECTIVE:   Patient stated I've been doing them. . Like this (demonstrates cardiac UE exercise).      OBJECTIVE DATA SUMMARY:   Patient mobilized on continuous portable monitor/telemetry. Critical Behavior:  Neurologic State: Alert  Orientation Level: Oriented X4  Cognition: Appropriate decision making, Appropriate for age attention/concentration, Appropriate safety awareness  Safety/Judgement: Awareness of environment, Fall prevention  Functional Mobility Training:  Bed Mobility:  Log Rolling:  (NT- pt received OOB in chair at FOB)     Sit to Supine:  (NT- returned to chair at FOB )           Transfers:  Sit to Stand: Contact guard assistance  Stand to Sit: Contact guard assistance                             Balance:  Sitting: Intact  Standing: Impaired  Standing - Static: Good  Standing - Dynamic : Fair  Ambulation/Gait Training:  Distance (ft): 150 Feet (ft)  Assistive Device: Gait belt  Ambulation - Level of Assistance: Contact guard assistance        Gait Abnormalities: Decreased step clearance; Path deviations;Scissoring;Trunk sway increased        Base of Support: Narrowed     Speed/Bette: Pace decreased (<100 feet/min)  Step Length: Left shortened;Right shortened                                     Therapeutic Exercises:   Patient instructed on the benefits and demonstrated cardiac exercises while seated in chair with Supervision. Instructed and indicated understanding on how to progress reps, sets, against gravity, working up through weights and so on based on cardiologist clearance in prep for functional activity. Can use household items for weights. CARDIAC  EXERCISE   Sets   Reps   Active Active Assist   Passive Self ROM   Comments   Shoulder flexion 1 5 [x]                                            []                                            []                                            []                                               Shoulder abduction 1  5 [x]                                            []                                            []                                            []                                               Scapular retraction 1 5 [x]                                            []                                            []                                            []                                                 Pain:  Pain Scale 1: Numeric (0 - 10)              Pain Intervention(s) 1: Medication (see MAR)  Activity Tolerance:   VSS  Please refer to the flowsheet for vital signs taken during this treatment.   After treatment:   [x] Patient left in no apparent distress sitting up in chair  [] Patient left in no apparent distress in bed  [x] Call bell left within reach  [x] Nursing notified  [x] Family member present  [] Bed alarm activated    COMMUNICATION/COLLABORATION:   The patients plan of care was discussed with: Registered Nurse    Silvina MOLINA Means,PTA   Time Calculation: 13 mins

## 2018-07-16 NOTE — DIABETES MGMT
DTC Progress Note    Recommendations/ Comments: Chart reviewed for post prandial hyperglycemia. He received 5 units of correction insulin in the past 24 hours. Spoke with patient and he denies drinking sweets in the morning. He did receive his mealtime insulin after he started eating today. If appropriate, please consider:  - Discontinue mealtime insulin  - Decreasing Lantus to 28 units  - Adding Glipizide 5 mg BID    Current hospital DM medication: Humalog for correction, normal sensitivity scale, Sitagliptin 50 mg daily, Humalog 3 units AC and Lantus 32 units daily     Chart reviewed on [facility]. Patient is a 78 y.o. male with hx Type 2 Diabetes on Glipizide 10 mg BID, Sitagliptin 50 mg daily and Metformin 1000 mg BID  at home. A1c:   Lab Results   Component Value Date/Time    Hemoglobin A1c 7.4 (H) 06/22/2018 01:53 PM    Hemoglobin A1c 7.0 (H) 03/16/2018 09:28 AM       Recent Glucose Results:   Lab Results   Component Value Date/Time    GLU 89 07/16/2018 04:14 AM    GLUCPOC 292 (H) 07/16/2018 11:54 AM    GLUCPOC 96 07/16/2018 06:32 AM    GLUCPOC 198 (H) 07/15/2018 09:33 PM        Lab Results   Component Value Date/Time    Creatinine 1.11 07/16/2018 04:14 AM     Estimated Creatinine Clearance: 52.7 mL/min (based on Cr of 1.11). Active Orders   Diet    DIET CARDIAC Regular; Consistent Carb 1800kcal        PO intake:   Patient Vitals for the past 72 hrs:   % Diet Eaten   07/15/18 1643 50 %   07/15/18 1134 70 %   07/15/18 0818 80 %   07/14/18 1551 100 %   07/14/18 1115 100 %   07/14/18 0800 100 %       Will continue to follow as needed.     Thank you  Tonya Pope RD

## 2018-07-16 NOTE — PROGRESS NOTES
NUTRITION- DIETETIC tECHnICIAN    Pt seen for:       [x]                  Rescreen  []                  Food preferences/tolerances  []                  Food Allergies  []                  PO intake check  []                  Supplements  []                  Diet order clarification  []                  Education  []                  Other     Rescreen:    [x]                  Not at Nutrition Risk, rescreen per screening protocol  []                  At Nutrition Risk- RD referral         SUBJECTIVE/OBJECTIVE:     Information obtained from:  Patient      Diet:  Regular Cardiac, 1800 ADA    Intake: good    Patient Vitals for the past 100 hrs:   % Diet Eaten   07/15/18 1643 50 %   07/15/18 1134 70 %   07/15/18 0818 80 %   07/14/18 1551 100 %   07/14/18 1115 100 %   07/14/18 0800 100 %   07/13/18 1231 30 %   07/13/18 0814 20 %   07/12/18 1724 50 %       Weight Changes:       Wt Readings from Last 5 Encounters:   07/16/18 64.3 kg (141 lb 12.1 oz)   07/06/18 63.1 kg (139 lb 3.2 oz)   07/02/18 63.5 kg (140 lb)   06/28/18 64.9 kg (143 lb)   06/25/18 60.5 kg (133 lb 6.1 oz)       Problems Identified:      [x]                  Eating well and family bring some preferred meals   []                  Specified food preferences   []                  Dislikes supplements              []                  Allergies:   []                  Difficulty chewing      []                  Dentition    []                  Nausea/Vomiting   []                  Constipation   []                  Diarrhea    PLAN:     [x]                   Continue current diet and encourage intake  []                   Obtained/adjusted food preferences/tolerances and/or snacks options   []                   Dislikes supplements will try a substitution  []                   Modify diet for food allergies  []                   Adjust texture due to difficulty chewing   []                   Educated patient  []                   RD Referral  [x] Rescreen per screening protocol          Sebastian Brown, DTR

## 2018-07-16 NOTE — PROGRESS NOTES
Problem: Self Care Deficits Care Plan (Adult)  Goal: *Acute Goals and Plan of Care (Insert Text)  Occupational Therapy Goals  Initiated 7/10/2018  1. Patient will perform ADLs standing 5 mins without fatigue or LOB with supervision/set-up within 7 day(s). 2.  Patient will perform lower body ADLs with minimal assistance/contact guard assist and AE PRN within 7 day(s). 3.  Patient will perform upper body dressing with modified independence within 7 day(s). 4.  Patient will perform toilet transfers with supervision/set-up within 7 day(s). 5.  Patient will perform all aspects of toileting with supervision within 7 day(s). 6.  Patient will participate in cardiac/sternal upper extremity therapeutic exercise/activities to increase independence with ADLs with supervision/set-up for 5 minutes within 7 day(s). Occupational Therapy TREATMENT  Patient: Ayala Miller (69 y.o. male)  Date: 7/16/2018  Diagnosis: CAD   CAD (coronary artery disease) S/P CABG x 3  Procedure(s) (LRB):  CORONARY ARTERY BYPASS GRAFT x 3 with Left Internal Mammary Artery and Right Greater Saphenous Vein - Transesphageal EchoCardiogram and EpiAortic Ultrsound performed by Dr. Lilian Pascal (N/A) 7 Days Post-Op  Precautions: Fall, Sternal  Chart, occupational therapy assessment, plan of care, and goals were reviewed. ASSESSMENT:  Progressing well, agreeable to up and into bathroom with CGA, VCs for bed mobility technique, SBA/CGA for toilet transfer and standing activity at sink, no LOB but fair steadiness with navigation of small area. S for LB dressing seated, educated on activity modification with sternal precautions, cardiac exercise verbal review and ADL retraining, patient would benefit from short rehab stay versus home with family assist and transition to cardiac OP rehab. Patient eager to return home-brother is coming in town to assist- wife at home as able but not in room today.     Progression toward goals:  [x]       Improving appropriately and progressing toward goals  []       Improving slowly and progressing toward goals  []       Not making progress toward goals and plan of care will be adjusted     PLAN:  Patient continues to benefit from skilled intervention to address the above impairments. Continue treatment per established plan of care. Discharge Recommendations:  Rehab vs. Home with [facility] and transition to cardiac OP  Further Equipment Recommendations for Discharge:  TBD     SUBJECTIVE:   Patient stated I want to go home.     The patient stated 3/3 sternal precautions. Reviewed all 3 with patient. OBJECTIVE DATA SUMMARY:   Cognitive/Behavioral Status:  Neurologic State: Alert                   Functional Mobility and Transfers for ADLs:  Bed Mobility:  Rolling:  (NT- pt received OOB in chair at FOB)  Supine to Sit: Contact guard assistance (VCs for technique)  Sit to Supine:  (NT- returned to chair at FOB )    Transfers:  Sit to Stand: Contact guard assistance  Functional Transfers  Toilet Transfer : Stand-by assistance   Bed to Chair: Contact guard assistance  Balance:  Sitting: Intact  Standing: Impaired  Standing - Static: Good  Standing - Dynamic : Fair    ADL Intervention:       Grooming  Washing Hands: Stand-by assistance                   Lower Body Dressing Assistance  Socks: Supervision/set-up (tailor sit)              Patient instructed no asymmetrical reaching over head to ensure B UEs when shoulders >90* i.e. reaching in cabinets and dressing. Instruction on upper body dressing techniques of over head, then arms through to decrease pain and unilateral shoulder flexion >90*. Instruction on the benefits of utilizing B UEs during functional tasks i.e. opening the fridge, stepping into the tub. Instruction if continued pain at home with shoulder IR for BM hygiene can use wet wipes and toilet tongs PRN. Avoid valsalva maneuvers.   May have to adjust home setup to increase ease with items closer to waist height to prevent deep bending and the automatic  of asymmetrical UE WB/pushing for stabilization during bending. Benefit to don clothing tailor sitting and don all clothing while sitting prior to standing. Patient demonstrated lower body dressing S with tailor sitting and no assist.   Increase activity tolerance for home, work, and sexual intercourse by pacing self with increasing the arm exercises, sitting duration, frequency OOB, walking, standing, and ADLs. Instructed and indicated understanding of s/s of too much activity, how to respond to s/s safely. Therapeutic Exercises:   Patient instructed on the benefits and demonstrated cardiac exercises this AM with PT will complete later this evening, instructed on use of booklet for visual reference with Supervision. Instructed and indicated understanding on how to progress reps, sets against gravity, working up to 5 lbs, standing and so on based on surgeon clearance for more weight in prep for basic and instrumental ADLs. Instruction on the use of household items in place of weights as needed.     CARDIAC   EXERCISE    Sets    Reps    Active  Active Assist    Passive  Self ROM    Comments    Shoulder flexion  1  5   []                            []                             []                             []                                Shoulder abduction  1  5  []                             []                             []                             []                                Scapular elevation  1  5  []                             []                              []                             []                                Scapular retraction  1  5  []                             []                             []                             []                                Trunk rotation  1  5  []                             []                             []                             []                                Trunk sidebending  1  5  [] []                              []                             []                                          Pain:  Pain Scale 1: Numeric (0 - 10)              Pain Intervention(s) 1: Medication (see MAR)  Activity Tolerance:   good  Please refer to the flowsheet for vital signs taken during this treatment.   After treatment:   [x] Patient left in no apparent distress sitting up in chair  [] Patient left in no apparent distress in bed  [x] Call bell left within reach  [x] Nursing notified  [] Caregiver present  [] Bed alarm activated    COMMUNICATION/COLLABORATION:   The patients plan of care was discussed with: Registered Nurse    Rodney Moya OT  Time Calculation: 24 mins

## 2018-07-16 NOTE — PROGRESS NOTES
CSS Progress Note    Admit Date: 2018  POD:  6 Day Post-Op    Procedure:  Procedure(s):  CORONARY ARTERY BYPASS GRAFT x 3 with Left Internal Mammary Artery and Right Greater Saphenous Vein - Transesphageal EchoCardiogram and EpiAortic Ultrsound performed by Dr. Simona Elizabeth        Subjective:   Pt seen with Dr. Kesha Dwyer, pt sitting up in chair, looks well today. On RA. PT still recommending rehab      Objective:   Vitals:  Blood pressure 159/60, pulse 85, temperature 97.8 °F (36.6 °C), resp. rate 18, height 5' 3\" (1.6 m), weight 141 lb 12.1 oz (64.3 kg), SpO2 98 %. Temp (24hrs), Av.3 °F (36.8 °C), Min:97.8 °F (36.6 °C), Max:99.6 °F (37.6 °C)    EKG/Rhythm: SR     Oxygen Therapy:  Oxygen Therapy  O2 Sat (%): 98 % (18)  Pulse via Oximetry: 98 beats per minute (18)  O2 Device: Room air (18)  O2 Flow Rate (L/min): 1 l/min (18 07)  FIO2 (%): 40 % (18 1214)    CXR:   CXR Results  (Last 48 hours)    None            Admission Weight: Last Weight   Weight: 143 lb (64.9 kg) Weight: 141 lb 12.1 oz (64.3 kg)     Intake / Output / Drain:  Current Shift:    Last 24 hrs.:     Intake/Output Summary (Last 24 hours) at 18 0942  Last data filed at 18 0422   Gross per 24 hour   Intake              720 ml   Output             1175 ml   Net             -455 ml       EXAM:  General:  No obvious distress                                                                                        Lungs:   Clear to auscultation bilaterally, diminished in bases. Incision:  No erythema, drainage or swelling   Heart:  Regular rate and rhythm, S1, S2 normal, no murmur, click, rub or gallop. Abdomen:   Softer, no distention. Bowel sounds active. No masses,  No organomegaly. Extremities:  No edema. PPP. Neurologic:  Gross motor and sensory apparatus intact.      Labs:   Recent Labs      18   0632  18   0414   WBC   --   7.6   HGB   --   8.0*   HCT   --   26.6*   PLT   -- 213   NA   --   143   K   --   4.0   BUN   --   22*   CREA   --   1.11   GLU   --   89   GLUCPOC  96   --         Assessment:     Principal Problem:    S/P CABG x 3 (2018)      Overview: Coronary Artery Bypass Grafting x 3, LIMA to LAD, RSVG to OM, RSVG to RCA      Right GSV EVH    Active Problems:    CAD, multiple vessel (2018)      CAD (coronary artery disease) (2018)         Plan/Recommendations/Medical Decision Makin. S/p CABG x 3:  On asa, statin, and BB. 2. HTN: Cont BB, losartan. if elevated again will add norvasc. PRN hydralazine, Monitor   3. Atelectasis/wheezing: Increase IS and activity as tolerated. Cont PO lasix. Cont albuterol q6h nebs, steroid nebs. 4. DM: Hgba1c 7.4. DTC Following. Resume cardiac diet consistent carb -- NO JUICE. Cont BS ACHS, SSI. Need to resume metformin/glipizide when appropriate. Resume Gearline Kris now that ileus resolved. Lantus added 18 - 32 units, on mealtime 3 units as well  5. Acute anemia s/t post op blood loss & iron deficiency/B12 def: Recently had EGD/colonscopy after discovery of anemia during hospitalization. Showed gastritis. Cont pepcid. Cont IV iron - completed 5 doses, PO S06, folic acid and Vit C(caution d/t renal insuff). Monitor H/H. Pt lethargic/symptomatic with activity. Cont epogen 3x/week. Much improved after 1 unit pRBC . 6. Hyperlipidemia: cont statin. 7. Renal insufficiency: Cr stable. Resumed ARB -- watch closely! Monitor. Avoid nephrotoxic meds. Known recently to have hyperkalemia on outpt labs. K stable today. 8. Postop urinary retention:  Vasquez removed 18, pt reports he is voiding. On flomax. Cont lasix. 9. Distended abd/postop ileus: abd less distended, soft, +BM. Cont miralax. Stop pericolace, reglan. MOBILIZE. Limit narcotics. 10.  Dispo: PT/OT. Likely need inpt rehab--pending.       Signed By: Chen Graham NP

## 2018-07-17 LAB
ALBUMIN SERPL-MCNC: 2.5 G/DL (ref 3.5–5)
ALBUMIN/GLOB SERPL: 0.8 {RATIO} (ref 1.1–2.2)
ALP SERPL-CCNC: 169 U/L (ref 45–117)
ALT SERPL-CCNC: 39 U/L (ref 12–78)
ANION GAP SERPL CALC-SCNC: 8 MMOL/L (ref 5–15)
AST SERPL-CCNC: 33 U/L (ref 15–37)
BILIRUB SERPL-MCNC: 0.3 MG/DL (ref 0.2–1)
BUN SERPL-MCNC: 15 MG/DL (ref 6–20)
BUN/CREAT SERPL: 15 (ref 12–20)
CALCIUM SERPL-MCNC: 8.5 MG/DL (ref 8.5–10.1)
CHLORIDE SERPL-SCNC: 110 MMOL/L (ref 97–108)
CO2 SERPL-SCNC: 25 MMOL/L (ref 21–32)
CREAT SERPL-MCNC: 0.98 MG/DL (ref 0.7–1.3)
ERYTHROCYTE [DISTWIDTH] IN BLOOD BY AUTOMATED COUNT: 22 % (ref 11.5–14.5)
GLOBULIN SER CALC-MCNC: 3.3 G/DL (ref 2–4)
GLUCOSE BLD STRIP.AUTO-MCNC: 110 MG/DL (ref 65–100)
GLUCOSE BLD STRIP.AUTO-MCNC: 113 MG/DL (ref 65–100)
GLUCOSE BLD STRIP.AUTO-MCNC: 166 MG/DL (ref 65–100)
GLUCOSE BLD STRIP.AUTO-MCNC: 168 MG/DL (ref 65–100)
GLUCOSE SERPL-MCNC: 178 MG/DL (ref 65–100)
HCT VFR BLD AUTO: 26.8 % (ref 36.6–50.3)
HGB BLD-MCNC: 8.1 G/DL (ref 12.1–17)
MAGNESIUM SERPL-MCNC: 2 MG/DL (ref 1.6–2.4)
MCH RBC QN AUTO: 25.6 PG (ref 26–34)
MCHC RBC AUTO-ENTMCNC: 30.2 G/DL (ref 30–36.5)
MCV RBC AUTO: 84.5 FL (ref 80–99)
NRBC # BLD: 0.02 K/UL (ref 0–0.01)
NRBC BLD-RTO: 0.3 PER 100 WBC
PLATELET # BLD AUTO: 214 K/UL (ref 150–400)
PMV BLD AUTO: 11.2 FL (ref 8.9–12.9)
POTASSIUM SERPL-SCNC: 4.4 MMOL/L (ref 3.5–5.1)
PROT SERPL-MCNC: 5.8 G/DL (ref 6.4–8.2)
RBC # BLD AUTO: 3.17 M/UL (ref 4.1–5.7)
SERVICE CMNT-IMP: ABNORMAL
SODIUM SERPL-SCNC: 143 MMOL/L (ref 136–145)
WBC # BLD AUTO: 6.8 K/UL (ref 4.1–11.1)

## 2018-07-17 PROCEDURE — 74011636637 HC RX REV CODE- 636/637: Performed by: NURSE PRACTITIONER

## 2018-07-17 PROCEDURE — 94640 AIRWAY INHALATION TREATMENT: CPT

## 2018-07-17 PROCEDURE — 74011250637 HC RX REV CODE- 250/637: Performed by: NURSE PRACTITIONER

## 2018-07-17 PROCEDURE — 85027 COMPLETE CBC AUTOMATED: CPT | Performed by: NURSE PRACTITIONER

## 2018-07-17 PROCEDURE — 83735 ASSAY OF MAGNESIUM: CPT | Performed by: NURSE PRACTITIONER

## 2018-07-17 PROCEDURE — 82962 GLUCOSE BLOOD TEST: CPT

## 2018-07-17 PROCEDURE — 97535 SELF CARE MNGMENT TRAINING: CPT

## 2018-07-17 PROCEDURE — 94760 N-INVAS EAR/PLS OXIMETRY 1: CPT

## 2018-07-17 PROCEDURE — 97116 GAIT TRAINING THERAPY: CPT

## 2018-07-17 PROCEDURE — 74011000250 HC RX REV CODE- 250: Performed by: NURSE PRACTITIONER

## 2018-07-17 PROCEDURE — 74011250636 HC RX REV CODE- 250/636: Performed by: NURSE PRACTITIONER

## 2018-07-17 PROCEDURE — 36415 COLL VENOUS BLD VENIPUNCTURE: CPT | Performed by: NURSE PRACTITIONER

## 2018-07-17 PROCEDURE — 80053 COMPREHEN METABOLIC PANEL: CPT | Performed by: NURSE PRACTITIONER

## 2018-07-17 PROCEDURE — 65660000000 HC RM CCU STEPDOWN

## 2018-07-17 PROCEDURE — 74011250637 HC RX REV CODE- 250/637: Performed by: THORACIC SURGERY (CARDIOTHORACIC VASCULAR SURGERY)

## 2018-07-17 RX ORDER — FOLIC ACID 1 MG/1
1 TABLET ORAL DAILY
Qty: 30 TAB | Refills: 1 | Status: SHIPPED | OUTPATIENT
Start: 2018-07-18 | End: 2018-10-23 | Stop reason: ALTCHOICE

## 2018-07-17 RX ORDER — LANOLIN ALCOHOL/MO/W.PET/CERES
500 CREAM (GRAM) TOPICAL DAILY
Qty: 30 TAB | Refills: 1 | Status: SHIPPED | OUTPATIENT
Start: 2018-07-18 | End: 2021-06-10

## 2018-07-17 RX ORDER — LOSARTAN POTASSIUM 50 MG/1
100 TABLET ORAL DAILY
Qty: 60 TAB | Refills: 1 | Status: SHIPPED | OUTPATIENT
Start: 2018-07-17 | End: 2018-09-24 | Stop reason: SDUPTHER

## 2018-07-17 RX ORDER — GLIPIZIDE 5 MG/1
5 TABLET ORAL
Status: DISCONTINUED | OUTPATIENT
Start: 2018-07-17 | End: 2018-07-18 | Stop reason: HOSPADM

## 2018-07-17 RX ORDER — LOSARTAN POTASSIUM 50 MG/1
100 TABLET ORAL DAILY
Status: DISCONTINUED | OUTPATIENT
Start: 2018-07-17 | End: 2018-07-18 | Stop reason: HOSPADM

## 2018-07-17 RX ORDER — AMOXICILLIN 250 MG
1 CAPSULE ORAL DAILY
Qty: 30 TAB | Refills: 0 | Status: SHIPPED | OUTPATIENT
Start: 2018-07-17 | End: 2018-08-27 | Stop reason: ALTCHOICE

## 2018-07-17 RX ORDER — FAMOTIDINE 20 MG/1
20 TABLET, FILM COATED ORAL 2 TIMES DAILY
Status: DISCONTINUED | OUTPATIENT
Start: 2018-07-17 | End: 2018-07-18 | Stop reason: HOSPADM

## 2018-07-17 RX ORDER — ASCORBIC ACID 500 MG
500 TABLET ORAL DAILY
Qty: 30 TAB | Refills: 1 | Status: SHIPPED | OUTPATIENT
Start: 2018-07-18 | End: 2018-10-23 | Stop reason: ALTCHOICE

## 2018-07-17 RX ORDER — GUAIFENESIN 100 MG/5ML
81 LIQUID (ML) ORAL DAILY
Qty: 365 TAB | Refills: 0 | Status: SHIPPED | OUTPATIENT
Start: 2018-07-18 | End: 2021-01-20 | Stop reason: SDUPTHER

## 2018-07-17 RX ORDER — FERROUS SULFATE 324(65)MG
324 TABLET, DELAYED RELEASE (ENTERIC COATED) ORAL
Qty: 30 TAB | Refills: 1 | Status: SHIPPED | OUTPATIENT
Start: 2018-07-17 | End: 2018-10-23 | Stop reason: ALTCHOICE

## 2018-07-17 RX ADMIN — BUDESONIDE 500 MCG: 0.5 INHALANT RESPIRATORY (INHALATION) at 20:58

## 2018-07-17 RX ADMIN — INSULIN LISPRO 2 UNITS: 100 INJECTION, SOLUTION INTRAVENOUS; SUBCUTANEOUS at 11:48

## 2018-07-17 RX ADMIN — CHLORHEXIDINE GLUCONATE 10 ML: 1.2 RINSE ORAL at 18:00

## 2018-07-17 RX ADMIN — ACETAMINOPHEN 1000 MG: 10 INJECTION, SOLUTION INTRAVENOUS at 21:31

## 2018-07-17 RX ADMIN — TAMSULOSIN HYDROCHLORIDE 0.4 MG: 0.4 CAPSULE ORAL at 08:06

## 2018-07-17 RX ADMIN — ENOXAPARIN SODIUM 40 MG: 100 INJECTION SUBCUTANEOUS at 16:24

## 2018-07-17 RX ADMIN — ZOLPIDEM TARTRATE 5 MG: 5 TABLET ORAL at 21:31

## 2018-07-17 RX ADMIN — Medication 10 ML: at 21:31

## 2018-07-17 RX ADMIN — GLIPIZIDE 5 MG: 5 TABLET ORAL at 16:24

## 2018-07-17 RX ADMIN — ALBUTEROL SULFATE 2.5 MG: 2.5 SOLUTION RESPIRATORY (INHALATION) at 20:58

## 2018-07-17 RX ADMIN — FUROSEMIDE 40 MG: 40 TABLET ORAL at 08:07

## 2018-07-17 RX ADMIN — Medication 400 MG: at 08:06

## 2018-07-17 RX ADMIN — Medication 400 MG: at 17:10

## 2018-07-17 RX ADMIN — FAMOTIDINE 20 MG: 20 TABLET ORAL at 08:06

## 2018-07-17 RX ADMIN — ATORVASTATIN CALCIUM 20 MG: 20 TABLET, FILM COATED ORAL at 21:31

## 2018-07-17 RX ADMIN — METOPROLOL TARTRATE 50 MG: 50 TABLET ORAL at 08:06

## 2018-07-17 RX ADMIN — ASPIRIN 81 MG 81 MG: 81 TABLET ORAL at 08:07

## 2018-07-17 RX ADMIN — Medication 500 MCG: at 08:06

## 2018-07-17 RX ADMIN — BUDESONIDE 500 MCG: 0.5 INHALANT RESPIRATORY (INHALATION) at 09:31

## 2018-07-17 RX ADMIN — BENZONATATE 200 MG: 100 CAPSULE ORAL at 21:31

## 2018-07-17 RX ADMIN — BENZONATATE 200 MG: 100 CAPSULE ORAL at 04:09

## 2018-07-17 RX ADMIN — INSULIN LISPRO 3 UNITS: 100 INJECTION, SOLUTION INTRAVENOUS; SUBCUTANEOUS at 08:07

## 2018-07-17 RX ADMIN — FAMOTIDINE 20 MG: 20 TABLET ORAL at 17:10

## 2018-07-17 RX ADMIN — INSULIN LISPRO 3 UNITS: 100 INJECTION, SOLUTION INTRAVENOUS; SUBCUTANEOUS at 11:49

## 2018-07-17 RX ADMIN — METOPROLOL TARTRATE 50 MG: 50 TABLET ORAL at 21:31

## 2018-07-17 RX ADMIN — GLIPIZIDE 5 MG: 5 TABLET ORAL at 07:27

## 2018-07-17 RX ADMIN — POLYETHYLENE GLYCOL 3350 17 G: 17 POWDER, FOR SOLUTION ORAL at 08:07

## 2018-07-17 RX ADMIN — CHLORHEXIDINE GLUCONATE 10 ML: 1.2 RINSE ORAL at 09:00

## 2018-07-17 RX ADMIN — SITAGLIPTIN 50 MG: 25 TABLET, FILM COATED ORAL at 08:06

## 2018-07-17 RX ADMIN — INSULIN GLARGINE 25 UNITS: 100 INJECTION, SOLUTION SUBCUTANEOUS at 08:07

## 2018-07-17 RX ADMIN — Medication 10 ML: at 16:24

## 2018-07-17 RX ADMIN — Medication 10 ML: at 07:27

## 2018-07-17 RX ADMIN — OXYCODONE HYDROCHLORIDE AND ACETAMINOPHEN 500 MG: 500 TABLET ORAL at 08:07

## 2018-07-17 RX ADMIN — FOLIC ACID 1 MG: 1 TABLET ORAL at 08:06

## 2018-07-17 RX ADMIN — LOSARTAN POTASSIUM 100 MG: 50 TABLET ORAL at 08:07

## 2018-07-17 RX ADMIN — ALBUTEROL SULFATE 2.5 MG: 2.5 SOLUTION RESPIRATORY (INHALATION) at 09:31

## 2018-07-17 NOTE — PROGRESS NOTES
Spiritual Care Partner Volunteer visited patient in Rm [id number] on 7/17/2018.   Documented by:  Chaplain Talbot MDiv, MS, [facility]  [phone])

## 2018-07-17 NOTE — PROGRESS NOTES
Problem: Mobility Impaired (Adult and Pediatric)  Goal: *Acute Goals and Plan of Care (Insert Text)  Physical Therapy Goals    Weekly reassessment completed 7/15/18. Goals appropriate for carryover, progressing well towards all. Initiated 07/10/18   1. Patient will move from supine to sit and sit to supine , scoot up and down and roll side to side in bed with modified independence within 5 day(s). 2.  Patient will transfer from bed to chair and chair to bed with modified independence using the least restrictive device within 5 day(s). 3.  Patient will perform sit to stand with modified independence within 5 day(s). 4.  Patient will ambulate with modified independence for 300 feet with the least restrictive device within 5 day(s). 5.  Patient will ascend/descend 12 stairs with one handrail(s) with modified independence within 5 day(s). 6.  Patient will perform cardiac exercises per protocol with independence within 5 days. 7.  Patient will verbally and functionally recall 3/3 sternal precautions within 5 days. physical Therapy TREATMENT  Patient: Valerie Decker (69 y.o. male)  Date: 7/17/2018  Diagnosis: CAD   CAD (coronary artery disease) S/P CABG x 3  Procedure(s) (LRB):  CORONARY ARTERY BYPASS GRAFT x 3 with Left Internal Mammary Artery and Right Greater Saphenous Vein - Transesphageal EchoCardiogram and EpiAortic Ultrsound performed by Dr. Deborah Silva (N/A) 8 Days Post-Op  Precautions: Fall, Sternal  Chart, physical therapy assessment, plan of care and goals were reviewed. ASSESSMENT:  Patient able to demonstrate significantly improved gait mechanics and steadiness - no overt LOB even without an AD.  Patient able to complete bed mobility with standby assist and verbal cuing (unsuccessively utilizing long sit technique to achieve EOB sitting initially; cuing provided for log roll), perform sit <> stand transfers for functional strengthening from varying chair heights/compliances with supervision (for observation only), ambulate x 150 ft, twice, with and without AD (rolling walker almost seemed to be a tripping hazard vs beneficial; minimal path deviations with introduction of head turns and obstacles; good use of righting reactions), and negotiated x 10 steps with single railing support for balance and with a step-over-step pattern. Throughout session, patient demonstrated good adherence/compliance to sternal precautions without verbal reminding. At this time, recommend discharge home with family supervision PRN (brother will be in-town till Saturday) and HHPT. Progression toward goals:  [x]    Improving appropriately and progressing toward goals  []    Improving slowly and progressing toward goals  []    Not making progress toward goals and plan of care will be adjusted     PLAN:  Patient continues to benefit from skilled intervention to address the above impairments. Continue treatment per established plan of care. Discharge Recommendations:  Home Health  Further Equipment Recommendations for Discharge:  None     SUBJECTIVE:   Patient stated This [walker] is a nuisance.     OBJECTIVE DATA SUMMARY:   Critical Behavior:  Neurologic State: Alert  Orientation Level: Oriented X4  Cognition: Appropriate decision making, Appropriate for age attention/concentration, Appropriate safety awareness, Follows commands  Safety/Judgement: Awareness of environment, Good awareness of safety precautions, Insight into deficits  Functional Mobility Training:  Bed Mobility:  Supine to Sit: Stand-by assistance (+ VCs for technique (initially trying to long sit))  Scooting: Independent  Transfers:  Sit to Stand: Supervision (For observation/safety only)  Stand to Sit: Supervision  Balance:  Sitting: Intact; Without support  Standing: Intact; Without support  Ambulation/Gait Training:  Distance (ft): 150 Feet (ft) (x 2)  Assistive Device: Gait belt (+ RW trial)  Ambulation - Level of Assistance: Stand-by assistance (CLOSE)  Gait Abnormalities: Altered arm swing;Decreased step clearance; Path deviations (MINIMAL path deviations this date)  Base of Support: Narrowed  Step Length: Right shortened;Left shortened    Stairs:  Number of Stairs Trained: 10  Stairs - Level of Assistance: Stand-by assistance   Rail Use: Left  (For balance only)    Pain:  Pain Scale 1: Numeric (0 - 10)  Pain Intensity 1: 0    Activity Tolerance:  Please refer to the flowsheet for vital signs taken during this treatment.   After treatment:   [x]    Patient left in no apparent distress sitting up in chair  []    Patient left in no apparent distress in bed  [x]    Call bell left within reach  [x]    Nursing notified  []    Caregiver present  []    Bed alarm activated    COMMUNICATION/COLLABORATION:   The patients plan of care was discussed with: Registered Nurse and Rehabilitation Attendant    Alber Connolly PT, DPT   Time Calculation: 16 mins

## 2018-07-17 NOTE — PROGRESS NOTES
Problem: Self Care Deficits Care Plan (Adult)  Goal: *Acute Goals and Plan of Care (Insert Text)  Occupational Therapy Goals  Initiated 7/10/2018  1. Patient will perform ADLs standing 5 mins without fatigue or LOB with supervision/set-up within 7 day(s). 2.  Patient will perform lower body ADLs with minimal assistance/contact guard assist and AE PRN within 7 day(s). 3.  Patient will perform upper body dressing with modified independence within 7 day(s). 4.  Patient will perform toilet transfers with supervision/set-up within 7 day(s). 5.  Patient will perform all aspects of toileting with supervision within 7 day(s). 6.  Patient will participate in cardiac/sternal upper extremity therapeutic exercise/activities to increase independence with ADLs with supervision/set-up for 5 minutes within 7 day(s). Occupational Therapy TREATMENT/ Discharge  Patient: Valerie Decker (69 y.o. male)  Date: 7/17/2018  Diagnosis: CAD   CAD (coronary artery disease) S/P CABG x 3  Procedure(s) (LRB):  CORONARY ARTERY BYPASS GRAFT x 3 with Left Internal Mammary Artery and Right Greater Saphenous Vein - Transesphageal EchoCardiogram and EpiAortic Ultrsound performed by Dr. Deborah Silva (N/A) 8 Days Post-Op  Precautions: Fall, Sternal  Chart, occupational therapy assessment, plan of care, and goals were reviewed. ASSESSMENT:  Patient presents sitting in chair, alert and agreeable to therapy. Recalls 3/3 sternal precautions and receptive to education on additional ADL/ activity modifications. Demonstrates ability to tailor sit for donning/ doffing socks to avoid deep bending and practiced donning pants/ underwear with SPV by raising BLE, avoided deep bending. Performed sit-stand with SPV and practiced moving objects from overhead cabinet <> counter, using BUE together when reaching overhead and demonstrates intact dynamic standing balance. Reviewed cardiac UE exercises, patient performed 10 reps of each seated in chair. Patient's brother and wife are available to provide assistance at home. Recommend d/c home with family support and no additional OT needs when medically stable. Progression toward goals:  [x]       Improving appropriately and progressing toward goals  []       Improving slowly and progressing toward goals  []       Not making progress toward goals and plan of care will be adjusted       PLAN:  Patient will be discharged from occupational therapy at this time. Rationale for discharge:  [x]   Goals Achieved  []   [address]  []   Patient not participating in therapy  []   Other:  Discharge Recommendations:  Home with family support  Further Equipment Recommendations for Discharge:  LH sponge, reacher        SUBJECTIVE:   Patient stated I'm doing alright.     The patient stated 3/3 sternal precautions. Reviewed all 3 with patient. OBJECTIVE DATA SUMMARY:   Cognitive/Behavioral Status:  Neurologic State: Alert  Orientation Level: Oriented X4  Cognition: Appropriate decision making; Appropriate for age attention/concentration; Appropriate safety awareness; Follows commands  Perception: Appears intact  Perseveration: No perseveration noted  Safety/Judgement: Awareness of environment;Good awareness of safety precautions; Insight into deficits    Functional Mobility and Transfers for ADLs:    Transfers:  Sit to Stand: Supervision         Balance:  Sitting: Intact  Standing: Intact    ADL Intervention:          Lower Body Dressing Assistance  Underpants: Supervision/set-up (threaded raising BLE seated, stood with SPV to raise)  Pants With Elastic Waist: Supervision/set-up (threaded raising BLE seated, stood with SPV to raise)  Socks: Supervision/set-up (tailor sitting)         Cognitive Retraining  Safety/Judgement: Awareness of environment;Good awareness of safety precautions; Insight into deficits    Patient instructed no asymmetrical reaching over head to ensure B UEs when shoulders >90* i.e. reaching in cabinets and dressing. Instruction on upper body dressing techniques of over head, then arms through to decrease pain and unilateral shoulder flexion >90*. Instructed to void valsalva maneuvers. May have to adjust home setup to increase ease with items closer to waist height to prevent deep bending and the automatic  of asymmetrical UE WB/pushing for stabilization during bending. Benefit to don clothing tailor sitting and don all clothing while sitting prior to standing. Patient demonstrated lower body dressing seated/ standing to raise with SPV. Therapeutic Exercises:   Patient instructed on the benefits and demonstrated cardiac exercises while seated with Supervision. Instructed and indicated understanding on how to progress reps, sets against gravity, working up to 5 lbs, standing and so on based on surgeon clearance for more weight in prep for basic and instrumental ADLs. Instruction on the use of household items in place of weights as needed.     CARDIAC   EXERCISE    Sets    Reps    Active  Active Assist    Passive  Self ROM    Comments    Shoulder flexion  1  10   [x]                            []                             []                             []                                Shoulder abduction  1  10 [x]                             []                             []                             []                                Scapular elevation  1  10  [x]                             []                              []                             []                                Scapular retraction  1  10 [x]                             []                             []                             []                                Trunk rotation  1  10 [x]                             []                             []                             []                                Trunk sidebending  1  10 [x]                             []                              [] []                                          Pain:  Pain Scale 1: Numeric (0 - 10)  Pain Intensity 1: 0              Activity Tolerance:   VSS    After treatment:   [x] Patient left in no apparent distress sitting up in chair  [] Patient left in no apparent distress in bed  [x] Call bell left within reach  [x] Nursing notified  [] Caregiver present  [] Bed alarm activated    COMMUNICATION/COLLABORATION:   The patients plan of care was discussed with: Physical Therapist and Registered Nurse    Aristides Marcano OT  Time Calculation: 18 mins

## 2018-07-17 NOTE — PROGRESS NOTES
Cardiac Surgery Care Coordinator- Met with Barney Jane, reviewed plan of care and discussed potential  discharge date. Reinforced sternal precautions and encouraged continued use of the incentive spirometer. Reviewed goals for the day and emphasized the importance of increased activity to meet discharge goals. Will continue to follow for educational and emotional needs.  Bipin Rodriguez RN

## 2018-07-17 NOTE — PROGRESS NOTES
CSS Progress Note    Admit Date: 2018  POD:  7 Day Post-Op    Procedure:  Procedure(s):  CORONARY ARTERY BYPASS GRAFT x 3 with Left Internal Mammary Artery and Right Greater Saphenous Vein - Transesphageal EchoCardiogram and EpiAortic Ultrsound performed by Dr. Zak Alberts        Subjective:   Pt seen with Dr. Betzy Zuñiga, pt sitting up in chair, looks well today. On RA. PT still recommending rehab, but rehab declined. BP elevated      Objective:   Vitals:  Blood pressure 158/65, pulse 85, temperature 97.9 °F (36.6 °C), resp. rate 16, height 5' 3\" (1.6 m), weight 142 lb 13.7 oz (64.8 kg), SpO2 96 %. Temp (24hrs), Av °F (36.7 °C), Min:97.5 °F (36.4 °C), Max:98.6 °F (37 °C)    EKG/Rhythm: SR     Oxygen Therapy:  Oxygen Therapy  O2 Sat (%): 96 % (18 08)  Pulse via Oximetry: 99 beats per minute (18)  O2 Device: Room air (18)  O2 Flow Rate (L/min): 1 l/min (18 07)  FIO2 (%): 40 % (18 1214)    CXR:   CXR Results  (Last 48 hours)    None            Admission Weight: Last Weight   Weight: 143 lb (64.9 kg) Weight: 142 lb 13.7 oz (64.8 kg)     Intake / Output / Drain:  Current Shift: 701 - 1900  In: 440 [P.O.:240; I.V.:200]  Out: -   Last 24 hrs.:     Intake/Output Summary (Last 24 hours) at 18  Last data filed at 18 08   Gross per 24 hour   Intake              680 ml   Output              400 ml   Net              280 ml       EXAM:  General:  No obvious distress                                                                                        Lungs:   Clear to auscultation bilaterally, diminished in bases. Incision:  No erythema, drainage or swelling   Heart:  Regular rate and rhythm, S1, S2 normal, no murmur, click, rub or gallop. Abdomen:   Still firm, no distention. Bowel sounds active. No masses,  No organomegaly. Extremities:  No edema. PPP. Neurologic:  Gross motor and sensory apparatus intact.      Labs:   Recent Labs 18   0645  18   0411   WBC   --   6.8   HGB   --   8.1*   HCT   --   26.8*   PLT   --   214   NA   --   143   K   --   4.4   BUN   --   15   CREA   --   0.98   GLU   --   178*   GLUCPOC  113*   --         Assessment:     Principal Problem:    S/P CABG x 3 (2018)      Overview: Coronary Artery Bypass Grafting x 3, LIMA to LAD, RSVG to OM, RSVG to RCA      Right GSV EVH    Active Problems:    CAD, multiple vessel (2018)      CAD (coronary artery disease) (2018)         Plan/Recommendations/Medical Decision Makin. S/p CABG x 3:  On asa, statin, and BB. 2. HTN: Cont BB, increase losartan. PRN hydralazine, Monitor   3. Atelectasis/wheezing: Increase IS and activity as tolerated. Cont PO lasix. Cont albuterol q6h nebs, steroid nebs. 4. DM: Hgba1c 7.4. DTC Following. cardiac diet consistent carb -- NO JUICE. Cont BS ACHS, SSI. Need to resume metformin when appropriate. Cont [facility], resume glipizide 5 mg PO Daily (was taking 10 mg BID at home). Reduce Lantus 25 units, on mealtime 3 units. 5. Acute anemia s/t post op blood loss & iron deficiency/B12 def: Recently had EGD/colonscopy after discovery of anemia during hospitalization. Showed gastritis. Cont pepcid. Cont IV iron - completed 5 doses, PO A50, folic acid and Vit C(caution d/t renal insuff). Monitor H/H. Pt lethargic/symptomatic with activity. Cont epogen 3x/week. Much improved after 1 unit pRBC . 6. Hyperlipidemia: cont statin. 7. Renal insufficiency: Cr stable. Resumed ARB -- watch closely! Monitor. Avoid nephrotoxic meds. Known recently to have hyperkalemia on outpt labs. K stable today. 8. Postop urinary retention:  Vasquez removed 18, pt reports he is voiding. On flomax. Cont lasix. 9. Distended abd/postop ileus: abd less distended, soft, +BM. Cont miralax. Stop pericolace, reglan. MOBILIZE. Limit narcotics. 10.  Dispo: PT/OT. [facility] for [facility]pt rehab [facility].    Will work towards home w/ [facility] services.      Signed By: Lindsay Jackson NP

## 2018-07-17 NOTE — DIABETES MGMT
DTC Cardiac surgery progress note    Recommendations/ Comments: Chart reviewed for follow up    If appropriate, please consider:  - Discontinue mealtime insulin  - Adding PM dose of Glipizide 5 mg    Current hospital DM medication: Humalog for correction, normal sensitivity scale, Sitagliptin 50 mg daily, Humalog 3 units AC, Glipizide 5 mg daily and Lantus 25 units daily     Chart reviewed on [facility]. POD 7 CABG x3    Patient is a 78 y.o. male with hx Type 2 Diabetes on Glipizide 10 mg BID, Sitagliptin 50 mg daily and Metformin 1000 mg BID  at home. A1c:   Lab Results   Component Value Date/Time    Hemoglobin A1c 7.4 (H) 06/22/2018 01:53 PM    Hemoglobin A1c 7.0 (H) 03/16/2018 09:28 AM       Recent Glucose Results:   Lab Results   Component Value Date/Time     (H) 07/17/2018 04:11 AM    GLUCPOC 166 (H) 07/17/2018 11:03 AM    GLUCPOC 113 (H) 07/17/2018 06:45 AM    GLUCPOC 189 (H) 07/16/2018 09:31 PM        Lab Results   Component Value Date/Time    Creatinine 0.98 07/17/2018 04:11 AM     Estimated Creatinine Clearance: 59.7 mL/min (based on Cr of 0.98). Active Orders   Diet    DIET CARDIAC Regular; Consistent Carb 1800kcal        PO intake:   Patient Vitals for the past 72 hrs:   % Diet Eaten   07/17/18 1104 100 %   07/17/18 0802 100 %   07/16/18 1151 70 %   07/16/18 0742 70 %   07/15/18 1643 50 %   07/15/18 1134 70 %   07/15/18 0818 80 %   07/14/18 1551 100 %       Will continue to follow as needed.     Thank you  Ruddy Pope RD

## 2018-07-17 NOTE — PROGRESS NOTES
Renal Dosing/Monitoring  Medication: Famotidine  Indication:  SUP  Current regimen:  20 mg every 24 hr  Recent Labs      07/17/18   0411  07/16/18   0414  07/15/18   0403   WBC  6.8  7.6  6.9   CREA  0.98  1.11  1.11   BUN  15  22*  32*       Estimated CrCl:  ~ 60 ml/min  Plan: Will change to 20 mg Q 12 hrs for crcl > 50 per renal adjustment protocol. Pharmacy to monitor patient daily for dosage adjustments as needed.

## 2018-07-17 NOTE — PROGRESS NOTES
0730:  Bedside shift change report given to Loki Thomas RN (oncoming nurse) by Sonali Hammer RN (offgoing nurse). Report included the following information SBAR, Kardex, Procedure Summary, Intake/Output, MAR and Recent Results. 0915:  Up in chair for breakfast. Patient ambulated 300 feet with nursing. 1010:  Patient voided in toilet. 1413:  Up in chair for lunch. Patient ambulated 500 feet with nursing. 1800:  Up in chair for dinner. Patient ambulated 600 feet with nursing. 1930:  Bedside shift change report given to Edgardo Mckoy RN (oncoming nurse) by Loki Thomas RN (offgoing nurse). Report included the following information SBAR, Kardex, Procedure Summary, Intake/Output, MAR and Recent Results. Problem: CABG: Post-Op Day 5  Goal: Off Pathway (Use only if patient is Off Pathway)  Outcome: Progressing Towards Goal  Patient working on increasing ambulation. Problem: Pressure Injury - Risk of  Goal: *Prevention of pressure injury  Document Mike Scale and appropriate interventions in the flowsheet. Outcome: Progressing Towards Goal  Pressure Injury Interventions:  Sensory Interventions: Assess changes in LOC, Assess need for specialty bed, Avoid rigorous massage over bony prominences, Chair cushion, Keep linens dry and wrinkle-free, Maintain/enhance activity level, Minimize linen layers, Monitor skin under medical devices, Pad between skin to skin, Pressure redistribution bed/mattress (bed type), Turn and reposition approx.  every two hours (pillows and wedges if needed)    Moisture Interventions: Absorbent underpads, Apply protective barrier, creams and emollients, Assess need for specialty bed, Maintain skin hydration (lotion/cream), Minimize layers    Activity Interventions: Increase time out of bed    Mobility Interventions: Pressure redistribution bed/mattress (bed type)    Nutrition Interventions: Document food/fluid/supplement intake, Offer support with meals,snacks and hydration    Friction and Shear Interventions: Apply protective barrier, creams and emollients, HOB 30 degrees or less, Minimize layers, Transferring/repositioning devices               Problem: Falls - Risk of  Goal: *Absence of Falls  Document Laverne Fall Risk and appropriate interventions in the flowsheet.    Outcome: Progressing Towards Goal  Fall Risk Interventions:  Mobility Interventions: Patient to call before getting OOB    Mentation Interventions: Adequate sleep, hydration, pain control, Door open when patient unattended, Evaluate medications/consider consulting pharmacy, Increase mobility, More frequent rounding, Toileting rounds, Update white board    Medication Interventions: Teach patient to arise slowly, Patient to call before getting OOB    Elimination Interventions: Patient to call for help with toileting needs

## 2018-07-17 NOTE — ADT AUTH CERT NOTES
Utilization Review           Coronary Artery Bypass Graft (CABG) - Care Day 8 (7/16/2018) by Theresa Esteves RN        Review Status Review Entered       Completed 7/16/2018       Details              Care Day: 8 Care Date: 7/16/2018 Level of Care: Step Down       Guideline Day 3        Level Of Care       ( ) Transfer to intermediate care       7/16/2018 2:55 PM EDT by Tammy Perry         step down                     Clinical Status       (X) * Dangerous arrhythmia absent       7/16/2018 2:55 PM EDT by Tammy Perry         none noted              (X) * Pain absent or managed              Activity       (X) * Minimal ambulatory activity       7/16/2018 2:55 PM EDT by Tammy Perry         PT/OT                     Routes       (X) * Oral medications       (X) * Advance diet       7/16/2018 2:55 PM EDT by Tammy Perry         cardiac diet                     Interventions       (X) * Chest tube absent       7/16/2018 2:55 PM EDT by Tammy Perry         none noted              (X) * Central lines absent       7/16/2018 2:55 PM EDT by Tammy Perry         none noted                     Medications       (X) * Epidural analgesia absent [J]       7/16/2018 2:55 PM EDT by Tammy Perry         none noted              (X) Aspirin       7/16/2018 2:55 PM EDT by Tammy Perry         ASA 81mg PO daily              (X) Beta-blocker       7/16/2018 2:55 PM EDT by Tammy Perry         Lopressor 50mg PO Q12h              (X) Statin medication       7/16/2018 2:55 PM EDT by Tammy Perry         Lipitor 20mg PO bedtime              (X) Antihypertensive medication if needed       7/16/2018 2:55 PM EDT by Tammy Perry         cozaar 50mg PO daily                     7/16/2018 2:55 PM EDT by Tammy Perry       Subject: Additional Clinical Information       7/16/18Vitals: 98.3, 82, 18, 111/47, 98%Meds: ofirmev 1000mg IV X1, albuterol neb BID, brovana neb BID, pulmicort neb BID, ASA 81mg PO daily, Lipitor 20mg PO bedtime, tessalon 200mg PO TID, vitamin B12 PO daily, lovenox 40mg SC Q24h, epogen 6000 units SC X1, Pepcid 20mg PO daily, folvite 1mg PO daily, lasix 40mg PO daily, mag-ox 400mg PO BID, cozaar 50mg PO daily, Lopressor 50mg PO Q12h, Januvia 50mg PO daily, Flomax 0.4mg PO daily  labs: Hgb 8.0, hct 26.6, BUN 22plan: cardiac diet, I&O, SCDs, oximetry spot check, PT/OT, daily weight, wound care daily                                   * Milestone              Additional Notes       18       Cardiothoracic Surgery:       Pt seen with Dr. Nicholas Lal, pt sitting up in chair, looks well today.  On RA.   PT still recommending rehab       Plan/Recommendations/Medical Decision Makin. S/p CABG x 3:  On asa, statin, and BB.         2. HTN: Cont BB, losartan. if elevated again will add norvasc. PRN hydralazine, Monitor        3. Atelectasis/wheezing: Increase IS and activity as tolerated.  Cont PO lasix.  Cont albuterol q6h nebs, steroid nebs.       4. DM: Hgba1c 7.4. DTC Following.  Resume cardiac diet consistent carb -- NO JUICE.  Cont BS ACHS, SSI.  Need to resume metformin/glipizide when appropriate. Resume [place] now that ileus resolved.  Lantus added 18 - 32 units, on mealtime 3 units as well       5. Acute anemia s/t post op blood loss & iron deficiency/B12 def: Recently had EGD/colonscopy after discovery of anemia during hospitalization.  Showed gastritis.  Cont pepcid.   Cont IV iron - completed 5 doses, PO V99, folic acid and Vit C(caution d/t renal insuff).  Monitor H/H.  Pt lethargic/symptomatic with activity. Cont epogen 3x/week.   Much improved after 1 unit pRBC .        6. Hyperlipidemia: cont statin.       7. Renal insufficiency: Cr stable.  Resumed ARB -- watch closely! Monitor. Avoid nephrotoxic meds.   Known recently to have hyperkalemia on outpt labs.  K stable today.        8. Postop urinary retention:  Vasquez removed 18, pt reports he is voiding. On flomax.  Cont lasix.        9. Distended abd/postop ileus: abd less distended, soft, +BM. Cont miralax. Stop pericolace, reglan.  MOBILIZE.  Limit narcotics.        10.  Dispo: PT/OT. Geraldine Groves need inpt rehab--pending.                       PT:       Pt received sitting up in armless chair at [facility] w/ family member. Pt agreeable to PT, reports he had been up and amb in hallway at least 3x prior to therapy arrival. Pt amb 150 FT this session w/o AD, Continue to note path deviations and scissor-like gait (no major LOB this session but gait mild-moderately unsteady). Pt reports amb at increased pace at baseline compared to pace he has been amb this session. Pt returned to chair at [facility] (as was received), completed cardiac UE exercises w/ Supervision and remained in chair w/call bell in reach. Pt able to recall 3/3 sternal precautions and demonstrated adherence during session (pt family member present in room reports pt using arms to push).  Pt w/ all needs in reach and met. PT to continue to follow. May benefit from Rehab to address gait deficits above for improvement w/ safety and stability.              OT: Progressing well, agreeable to up and into bathroom with CGA, VCs for bed mobility technique, SBA/CGA for toilet transfer and standing activity at sink, no LOB but fair steadiness with navigation of small area. S for LB dressing seated, educated on activity modification with sternal precautions, cardiac exercise verbal review and ADL retraining, patient would benefit from short rehab stay versus home with family assist and transition to cardiac OP rehab.        Patient eager to return home-brother is coming in town to assist- wife at home as able but not in room today.              CM:       CM received phone call from Nacho oClin with [facility]; patient's insurance denied authorization. CM informed NP, peer to peer declined. Patient to continue to work towards home health.  CM to inform patient and family           Coronary Artery Bypass Graft (CABG) - Care Day 5 (7/13/2018) by Luis Miguel Mueller        Review Status Review Entered       Completed 7/13/2018       Details              Care Day: 5 Care Date: 7/13/2018 Level of Care: Step Down       Guideline Day 2        Level Of Care       (X) Possible transfer to intermediate care              Clinical Status       (X) * Procedure completed       7/13/2018 4:59 PM EDT by Clementeen Balloon         Procedure:  Procedure(s): CORONARY ARTERY BYPASS GRAFT x 3 with Left Internal Mammary Artery and Right Greater Saphenous Vein - Transesphageal EchoCardiogram and EpiAortic Ultrsound.              (X) * Hemodynamic stability       7/13/2018 4:59 PM EDT by Clementeen Balloon         98.7  °F (37.1  °C) 87 . 163/60 -- -- r 22 sat 98 % Nasal cannula 2 l/min --              (X) * No evidence of myocardial ischemia       (X) * Mechanical ventilation absent              Activity       (X) * Up to chair       7/13/2018 4:59 PM EDT by Clementeen Balloon         ambulate in room                     Routes       (X) * Clear liquid diet, advance as tolerated       7/13/2018 4:59 PM EDT by Clementeen Balloon         clear liquids              (X) Oral or parenteral medications       7/13/2018 4:59 PM EDT by Clementeen Balloon         Brovana neb & pulmicort nebs bid. ,  Lovnox sq q 24h, EPO sq MWF. , Lasix 40 mg po daily, Venofer iv daily, COZAAR po daily.  Reglan iv q 6h, lopressor po q 12h, Januvia po x 1.                     Interventions       (X) Oxygen       7/13/2018 4:59 PM EDT by Clementeen Balloon         Nasal cannula 2 l/min              ( ) Assess cardiac biomarkers       7/13/2018 4:59 PM EDT by Clementeen Balloon         labs; bun 32, ca 8.3, albumin 2.6, AST 55.        * HH 6.6/ 21.8              (X) Glucose control       7/13/2018 4:59 PM EDT by Clementeen Balloon         Insulin sq scheduled and (ssi X  2 today)                     Medications       (X) Aspirin       7/13/2018 4:59 PM EDT by Clementeen Balloon         81 mg po daily            (X) Beta-blocker       2018 4:59 PM EDT by Rissa Maloney         lopressor po              (X) Statin medication       2018 4:59 PM EDT by Rissa Maloney         lipitor po q hs              (X) Antihypertensive medication if needed       2018 4:59 PM EDT by Rissa Maloney         apresoline 20 mg iv                     2018 4:59 PM EDT by Rissa Maloney       Subject: Additional Clinical Information                labs; bun 32, ca 8.3, albumin 2.6, AST 55.               * HH 6.6/ 21.8                CXR; Left lower lobe atelectasis or infiltrate with pleural effusion, worsened sinceprior study 2018. .   . Small right pleural effusion, new.                                              * Milestone              Additional Notes       -----DC Plan ;  facility for rehab .        ------Blood Txusn ; 1 unit PRBC today.        ------PER [facility]       Principal Problem:         S/P CABG x 3 (2018)           Overview: Coronary Artery Bypass Grafting x 3, LIMA to LAD, RSVG to OM, RSVG to RCA           Right V [facility]               Active Problems:         CAD, multiple vessel (2018)          CAD (coronary artery disease) (2018)                 Plan/Recommendations/Medical Decision Makin. S/p CABG x 3:  On asa, statin, and BB.         2. HTN: Cont BB, resume low dose losartan.  PRN hydralazine, Monitor        3. Atelectasis/wheezing: wean oxygen for 02 sat > 92%. Increase IS and activity as tolerated.  Cont PO lasix.  Cont albuterol q6h nebs, steroid nebs.   PA/lat today.         4. DM: Hgba1c 7.4.  DTC consult. Resume Sammie Natalie clear liquids -- NO JUICE.  Cont BS ACHS, SSI.  Need to resume metformin/glipizide when appropriate.    May need some lispro meal coverage?  Discussed w/ DTC.       5. Acute anemia s/t post op blood loss & iron deficiency/B12 def: Recently had EGD/colonscopy after discovery of anemia during hospitalization.  Showed gastritis.  Cont pepcid.   Cont IV iron, PO E31, folic acid and Vit C(caution d/t renal insuff).  Monitor H/H.  Pt lethargic/symptomatic with activity.  Give 1 unit pRBC now.   Start epogen 3x/week.        6. Hyperlipidemia: cont statin.       7. Renal insufficiency: Creat back down.  Resume ARB -- watch closely! Monitor. Avoid nephrotoxic meds.   Known recently to have hyperkalemia on outpt labs.  K stable today.        8. Postop urinary retention:  Vasquez replaced 7/11 in evening.  On flomax.  void trial Friday.  Cont lasix.        9. Distended abd/postop ileus: Repeat KUB Saturday.  cont reglan, pericolace, miralax.  MOBILIZE.  Limit narcotics.        10.  Dispo: PT/OT. Luz Maria Vanegas need inpt rehab--pending.                 Update:  In discussion w/ DTC this morning, ash resumed. Lydia Cedeño now stop d/t concerns for GI slowing in pts w/ ileus.  Already got dose today.  Started lispro 3 units TID. Lydia Cedeño need lantus for tonight-start 28 units w/ dinner.   Strongly discussed not drinking sugary beverages

## 2018-07-18 VITALS
DIASTOLIC BLOOD PRESSURE: 63 MMHG | RESPIRATION RATE: 18 BRPM | BODY MASS INDEX: 25.16 KG/M2 | WEIGHT: 141.98 LBS | HEIGHT: 63 IN | OXYGEN SATURATION: 100 % | HEART RATE: 79 BPM | SYSTOLIC BLOOD PRESSURE: 175 MMHG | TEMPERATURE: 97.9 F

## 2018-07-18 LAB
ALBUMIN SERPL-MCNC: 2.7 G/DL (ref 3.5–5)
ALBUMIN/GLOB SERPL: 0.7 {RATIO} (ref 1.1–2.2)
ALP SERPL-CCNC: 207 U/L (ref 45–117)
ALT SERPL-CCNC: 45 U/L (ref 12–78)
ANION GAP SERPL CALC-SCNC: 7 MMOL/L (ref 5–15)
AST SERPL-CCNC: 34 U/L (ref 15–37)
BILIRUB SERPL-MCNC: 0.4 MG/DL (ref 0.2–1)
BUN SERPL-MCNC: 14 MG/DL (ref 6–20)
BUN/CREAT SERPL: 14 (ref 12–20)
CALCIUM SERPL-MCNC: 8.6 MG/DL (ref 8.5–10.1)
CHLORIDE SERPL-SCNC: 105 MMOL/L (ref 97–108)
CO2 SERPL-SCNC: 26 MMOL/L (ref 21–32)
CREAT SERPL-MCNC: 1.01 MG/DL (ref 0.7–1.3)
ERYTHROCYTE [DISTWIDTH] IN BLOOD BY AUTOMATED COUNT: 23 % (ref 11.5–14.5)
GLOBULIN SER CALC-MCNC: 4 G/DL (ref 2–4)
GLUCOSE BLD STRIP.AUTO-MCNC: 89 MG/DL (ref 65–100)
GLUCOSE SERPL-MCNC: 74 MG/DL (ref 65–100)
HCT VFR BLD AUTO: 29.6 % (ref 36.6–50.3)
HGB BLD-MCNC: 8.9 G/DL (ref 12.1–17)
MAGNESIUM SERPL-MCNC: 1.9 MG/DL (ref 1.6–2.4)
MCH RBC QN AUTO: 25.9 PG (ref 26–34)
MCHC RBC AUTO-ENTMCNC: 30.1 G/DL (ref 30–36.5)
MCV RBC AUTO: 86.3 FL (ref 80–99)
NRBC # BLD: 0.02 K/UL (ref 0–0.01)
NRBC BLD-RTO: 0.2 PER 100 WBC
PLATELET # BLD AUTO: 254 K/UL (ref 150–400)
PMV BLD AUTO: 10.8 FL (ref 8.9–12.9)
POTASSIUM SERPL-SCNC: 5.3 MMOL/L (ref 3.5–5.1)
PROT SERPL-MCNC: 6.7 G/DL (ref 6.4–8.2)
RBC # BLD AUTO: 3.43 M/UL (ref 4.1–5.7)
SERVICE CMNT-IMP: NORMAL
SODIUM SERPL-SCNC: 138 MMOL/L (ref 136–145)
WBC # BLD AUTO: 8.3 K/UL (ref 4.1–11.1)

## 2018-07-18 PROCEDURE — 74011000250 HC RX REV CODE- 250: Performed by: NURSE PRACTITIONER

## 2018-07-18 PROCEDURE — 74011250637 HC RX REV CODE- 250/637: Performed by: NURSE PRACTITIONER

## 2018-07-18 PROCEDURE — 82962 GLUCOSE BLOOD TEST: CPT

## 2018-07-18 PROCEDURE — 74011636637 HC RX REV CODE- 636/637: Performed by: NURSE PRACTITIONER

## 2018-07-18 PROCEDURE — 36415 COLL VENOUS BLD VENIPUNCTURE: CPT | Performed by: NURSE PRACTITIONER

## 2018-07-18 PROCEDURE — 80053 COMPREHEN METABOLIC PANEL: CPT | Performed by: NURSE PRACTITIONER

## 2018-07-18 PROCEDURE — 83735 ASSAY OF MAGNESIUM: CPT | Performed by: NURSE PRACTITIONER

## 2018-07-18 PROCEDURE — 94640 AIRWAY INHALATION TREATMENT: CPT

## 2018-07-18 PROCEDURE — 74011250637 HC RX REV CODE- 250/637: Performed by: THORACIC SURGERY (CARDIOTHORACIC VASCULAR SURGERY)

## 2018-07-18 PROCEDURE — 97116 GAIT TRAINING THERAPY: CPT

## 2018-07-18 PROCEDURE — 85027 COMPLETE CBC AUTOMATED: CPT | Performed by: NURSE PRACTITIONER

## 2018-07-18 RX ORDER — ACETAMINOPHEN 325 MG/1
650 TABLET ORAL
Qty: 30 TAB | Refills: 0 | Status: SHIPPED
Start: 2018-07-18 | End: 2018-08-27 | Stop reason: ALTCHOICE

## 2018-07-18 RX ORDER — METOPROLOL TARTRATE 100 MG/1
100 TABLET ORAL EVERY 12 HOURS
Qty: 60 TAB | Refills: 1 | Status: SHIPPED | OUTPATIENT
Start: 2018-07-18 | End: 2018-09-16

## 2018-07-18 RX ORDER — METOPROLOL TARTRATE 50 MG/1
100 TABLET ORAL EVERY 12 HOURS
Status: DISCONTINUED | OUTPATIENT
Start: 2018-07-18 | End: 2018-07-18 | Stop reason: HOSPADM

## 2018-07-18 RX ORDER — INSULIN GLARGINE 100 [IU]/ML
25 INJECTION, SOLUTION SUBCUTANEOUS DAILY
Qty: 1 VIAL | Refills: 3 | Status: SHIPPED | OUTPATIENT
Start: 2018-07-18 | End: 2018-09-06

## 2018-07-18 RX ORDER — GLIPIZIDE 10 MG/1
5 TABLET ORAL 2 TIMES DAILY
Qty: 180 TAB | Refills: 2 | Status: SHIPPED | OUTPATIENT
Start: 2018-07-18 | End: 2019-10-22

## 2018-07-18 RX ORDER — FUROSEMIDE 40 MG/1
40 TABLET ORAL DAILY
Qty: 5 TAB | Refills: 0 | Status: SHIPPED | OUTPATIENT
Start: 2018-07-18 | End: 2018-07-23

## 2018-07-18 RX ORDER — OXYCODONE AND ACETAMINOPHEN 5; 325 MG/1; MG/1
1 TABLET ORAL
Qty: 30 TAB | Refills: 0 | Status: SHIPPED | OUTPATIENT
Start: 2018-07-18 | End: 2018-08-27 | Stop reason: ALTCHOICE

## 2018-07-18 RX ADMIN — INSULIN GLARGINE 25 UNITS: 100 INJECTION, SOLUTION SUBCUTANEOUS at 08:21

## 2018-07-18 RX ADMIN — FOLIC ACID 1 MG: 1 TABLET ORAL at 08:19

## 2018-07-18 RX ADMIN — FUROSEMIDE 40 MG: 40 TABLET ORAL at 08:20

## 2018-07-18 RX ADMIN — TAMSULOSIN HYDROCHLORIDE 0.4 MG: 0.4 CAPSULE ORAL at 08:20

## 2018-07-18 RX ADMIN — FAMOTIDINE 20 MG: 20 TABLET ORAL at 08:20

## 2018-07-18 RX ADMIN — ALBUTEROL SULFATE 2.5 MG: 2.5 SOLUTION RESPIRATORY (INHALATION) at 08:22

## 2018-07-18 RX ADMIN — Medication 400 MG: at 08:19

## 2018-07-18 RX ADMIN — GLIPIZIDE 5 MG: 5 TABLET ORAL at 07:24

## 2018-07-18 RX ADMIN — OXYCODONE HYDROCHLORIDE AND ACETAMINOPHEN 500 MG: 500 TABLET ORAL at 08:19

## 2018-07-18 RX ADMIN — BUDESONIDE 500 MCG: 0.5 INHALANT RESPIRATORY (INHALATION) at 08:22

## 2018-07-18 RX ADMIN — METOPROLOL TARTRATE 50 MG: 50 TABLET ORAL at 08:20

## 2018-07-18 RX ADMIN — CHLORHEXIDINE GLUCONATE 10 ML: 1.2 RINSE ORAL at 09:00

## 2018-07-18 RX ADMIN — SITAGLIPTIN 50 MG: 25 TABLET, FILM COATED ORAL at 08:19

## 2018-07-18 RX ADMIN — ASPIRIN 81 MG 81 MG: 81 TABLET ORAL at 08:20

## 2018-07-18 RX ADMIN — LOSARTAN POTASSIUM 100 MG: 50 TABLET ORAL at 08:19

## 2018-07-18 RX ADMIN — Medication 500 MCG: at 08:19

## 2018-07-18 RX ADMIN — POLYETHYLENE GLYCOL 3350 17 G: 17 POWDER, FOR SOLUTION ORAL at 08:21

## 2018-07-18 RX ADMIN — Medication 10 ML: at 07:24

## 2018-07-18 NOTE — PROGRESS NOTES
Problem: Mobility Impaired (Adult and Pediatric)  Goal: *Acute Goals and Plan of Care (Insert Text)  Physical Therapy Goals    Weekly reassessment completed 7/15/18. Goals appropriate for carryover, progressing well towards all. Initiated 07/10/18   1. Patient will move from supine to sit and sit to supine , scoot up and down and roll side to side in bed with modified independence within 5 day(s). 2.  Patient will transfer from bed to chair and chair to bed with modified independence using the least restrictive device within 5 day(s). 3.  Patient will perform sit to stand with modified independence within 5 day(s). 4.  Patient will ambulate with modified independence for 300 feet with the least restrictive device within 5 day(s). 5.  Patient will ascend/descend 12 stairs with one handrail(s) with modified independence within 5 day(s). 6.  Patient will perform cardiac exercises per protocol with independence within 5 days. 7.  Patient will verbally and functionally recall 3/3 sternal precautions within 5 days. physical Therapy TREATMENT  Patient: Jacquie Amato (69 y.o. male)  Date: 7/18/2018  Diagnosis: CAD   CAD (coronary artery disease) S/P CABG x 3  Procedure(s) (LRB):  CORONARY ARTERY BYPASS GRAFT x 3 with Left Internal Mammary Artery and Right Greater Saphenous Vein - Transesphageal EchoCardiogram and EpiAortic Ultrsound performed by Dr. Annette Carey (N/A) 9 Days Post-Op  Precautions: Fall, Sternal  Chart, physical therapy assessment, plan of care and goals were reviewed. ASSESSMENT:  Patient demonstrated good dynamic standing balance - ambulation x 300 ft with vertical and horizontal head turns, functional reaching (floor-level, above shoulder height, outside MARIA LUISA), and standing cardiac exercises.  Reviewed, with family present, 5-lb weight lifting restrictions, use of \"splinting\" techniques while coughing/sneezing, continued use of incentive spirometer at home, role of HHPT, cardiac exercises and frequency of performance, signs/symptoms of DVTs/pneumonia/sternal wound dehiscence, and importance of frequent mobilization at home. At this time, patient remains appropriate for discharge home with family supervision as needed and follow-up HHPT in order to maximize safe and independent functional mobility. Progression toward goals:  [x]    Improving appropriately and progressing toward goals  []    Improving slowly and progressing toward goals  []    Not making progress toward goals and plan of care will be adjusted     PLAN:  Patient continues to benefit from skilled intervention to address the above impairments. Continue treatment per established plan of care. Discharge Recommendations:  Home Health  Further Equipment Recommendations for Discharge:  None     SUBJECTIVE:   Patient stated I am itching to get out of here.     OBJECTIVE DATA SUMMARY:   Critical Behavior:  Neurologic State: Alert  Orientation Level: Oriented X4  Cognition: Appropriate decision making, Appropriate for age attention/concentration, Appropriate safety awareness  Safety/Judgement: Awareness of environment, Good awareness of safety precautions, Insight into deficits  Functional Mobility Training:  Bed Mobility:  Supine to Sit: Modified independent; Additional time  Scooting: Independent  Transfers:  Sit to Stand: Modified independent; Additional time  Stand to Sit: Modified independent; Additional time  Balance:  Sitting: Intact; Without support  Standing: Intact; Without support  Ambulation/Gait Training:  Distance (ft): 300 Feet (ft)  Assistive Device: Gait belt  Ambulation - Level of Assistance: Supervision (For observation only)  Base of Support: Narrowed (No overt LOB)    Pain:  Pain Scale 1: Numeric (0 - 10)  Pain Intensity 1: 0    Activity Tolerance:  Please refer to the flowsheet for vital signs taken during this treatment.   After treatment:   [x]    Patient left in no apparent distress sitting up in chair  []    Patient left in no apparent distress in bed  [x]    Call bell left within reach  [x]    Nursing notified  []    Caregiver present   []    Bed alarm activated    COMMUNICATION/COLLABORATION:   The patients plan of care was discussed with: Registered Nurse    Sloane Boateng PT, DPT   Time Calculation: 10 mins

## 2018-07-18 NOTE — PROGRESS NOTES
0730:  Bedside shift change report given to Vira Alicia RN (oncoming nurse) by Nhan Forrest RN (offgoing nurse). Report included the following information SBAR, Kardex, Procedure Summary, Intake/Output, MAR and Recent Results. 1135: I have reviewed discharge instructions with the patient and spouse. The patient and spouse verbalized understanding. IV and tele removed. Problem: CABG: Post-Op Day 5  Goal: Off Pathway (Use only if patient is Off Pathway)  Outcome: Progressing Towards Goal  Patient to be discharged today. Problem: Pressure Injury - Risk of  Goal: *Prevention of pressure injury  Document Mike Scale and appropriate interventions in the flowsheet. Outcome: Progressing Towards Goal  Pressure Injury Interventions:  Sensory Interventions: Assess changes in LOC, Assess need for specialty bed, Avoid rigorous massage over bony prominences, Chair cushion, Keep linens dry and wrinkle-free, Maintain/enhance activity level, Minimize linen layers, Monitor skin under medical devices, Pad between skin to skin, Pressure redistribution bed/mattress (bed type), Turn and reposition approx. every two hours (pillows and wedges if needed)    Moisture Interventions: Absorbent underpads, Apply protective barrier, creams and emollients, Assess need for specialty bed, Maintain skin hydration (lotion/cream), Minimize layers    Activity Interventions: Increase time out of bed, Chair cushion    Mobility Interventions: HOB 30 degrees or less    Nutrition Interventions: Document food/fluid/supplement intake, Offer support with meals,snacks and hydration    Friction and Shear Interventions: Apply protective barrier, creams and emollients, HOB 30 degrees or less, Minimize layers, Transferring/repositioning devices               Problem: Falls - Risk of  Goal: *Absence of Falls  Document Laverne Fall Risk and appropriate interventions in the flowsheet.    Outcome: Progressing Towards Goal  Fall Risk Interventions:  Mobility Interventions: Patient to call before getting OOB    Mentation Interventions: Adequate sleep, hydration, pain control, Door open when patient unattended, Evaluate medications/consider consulting pharmacy, Increase mobility, More frequent rounding, Toileting rounds, Update white board    Medication Interventions: Teach patient to arise slowly    Elimination Interventions: Patient to call for help with toileting needs

## 2018-07-18 NOTE — DISCHARGE INSTRUCTIONS
Cardiac Surgery Specialist    [address] [facility]                 [place] [address]  Office- [phone]  Fax- [phone]       Office- [phone]  Fax- [phone]  _____________________________________________________________  Dr. José Manuel Bañuelos ACNP-BC   [facility] HospitalCNP Elvis Fisherman, PA-C Corinne Lex PA-C Abigail Carolan PA-C     Name:Neal Kendall     Surgery & Date: Procedure(s):  CORONARY ARTERY BYPASS GRAFT x 3 with Left Internal Mammary Artery and Right Greater Saphenous Vein - Transesphageal EchoCardiogram and EpiAortic Ultrsound performed by Dr. Estela Doyle    Discharge Date: 7/18/18    MEDICATIONS:  Please refer to your After Visit Summary for your medication list. If you do not have a prescription for a new medication, you may purchase the medication over the counter. DO NOT TAKE ANY MEDICATIONS THAT ARE NOT ON THIS LIST    INSTRUCTIONS:  NO SMOKING OR TOBACCO PRODUCTS  Follow all the instructions in your discharge book  You may shower. Wash all incisions twice daily with mild soap and water. No lotions, ointments or powder. Call the office immediately for any redness, swelling, or drainage from your incision. Take your temperature daily and call for a temperature of 101 degrees or higher or for any symptoms that make you think you have and infection. Weigh yourself each morning. Call if you gain more than 5 pounds in 48 hours. Use the incentive spirometer 6-8 times a day-10 breaths each time. Use a pillow or your bear to splint your breastbone when coughing or sneezing. If you feel your breast bone clicking or popping, notify the office immediately. Walk several hundred feet several times daily.     DIET  Eat an American Heart Association/American Diabetic Association diet.  If you are having trouble with your appetite, eat what you can. Try eating small, frequent meals throughout the day. ACTIVITY  NO DRIVING--you will be evaluated to drive at your follow up visit. Increase your activity by walking several times a day. Stay out of bed most of the day. When sitting, keep your legs elevated. You may ride in a car, but you must get out every hour and walk around. If you ride in a car with an airbag that can not be switched off, put the seat ALL the way back or ride in the back seat. NO LIFTING MORE THAN 5 POUND FOR 1 MONTH, THEN YOU CAN INCREASE TO NO MORE THAN 10 LBS FOR THE 2nd MONTH AND NO MORE THAN 15 LBS FOR THE 3rd MONTH.  YOU WILL NO LONGER HAVE ANY LIFTING RESTRICTIONS AFTER 3 MONTHS. FOLLOW UP  1. Your first follow up appointment will be on 7/23/18 at 11:00 am. Our office is located in [address] [place] [address] on floor G-5. Your second follow up appointment will be in four weeks, on 8/13/18 at 1:15 pm. Please call our office at [phone] if you are unable to make either one of these appointments. 2. You will be receiving a call before your 5 day appointment to begin cardiac rehab. They are located in the [address] [place] [address] on [facility]. Their phone number is [phone]. Please call if you have not been contacted 2-3 weeks after discharge from the hospital.  3. We will make an appointment with your cardiologist at your last appointment. 4. Consult you primary care physician regarding your influenza &   pneumovax vaccines. 5.   Please bring all medications with you to your appointment.     Signature:___________________________________________________

## 2018-07-18 NOTE — PROGRESS NOTES
CSS Progress Note    Admit Date: 2018  POD:  8 Day Post-Op    Procedure:  Procedure(s):  CORONARY ARTERY BYPASS GRAFT x 3 with Left Internal Mammary Artery and Right Greater Saphenous Vein - Transesphageal EchoCardiogram and EpiAortic Ultrsound performed by Dr. Leola Babinski        Subjective:   Pt seen with Dr. Amee Valenzuela, pt sitting up in chair, looks well today. On RA. Ready for home     Objective:   Vitals:  Blood pressure 175/63, pulse 79, temperature 97.9 °F (36.6 °C), resp. rate 18, height 5' 3\" (1.6 m), weight 141 lb 15.6 oz (64.4 kg), SpO2 100 %. Temp (24hrs), Av.3 °F (36.8 °C), Min:97.9 °F (36.6 °C), Max:98.8 °F (37.1 °C)    EKG/Rhythm: SR     Oxygen Therapy:  Oxygen Therapy  O2 Sat (%): 100 % (18 0737)  Pulse via Oximetry: 81 beats per minute (18 0823)  O2 Device: Room air (18 0823)  O2 Flow Rate (L/min): 1 l/min (18 0726)  FIO2 (%): 40 % (18 1214)    CXR:   CXR Results  (Last 48 hours)    None            Admission Weight: Last Weight   Weight: 143 lb (64.9 kg) Weight: 141 lb 15.6 oz (64.4 kg)     Intake / Output / Drain:  Current Shift:    Last 24 hrs.:     Intake/Output Summary (Last 24 hours) at 18 0921  Last data filed at 18 2339   Gross per 24 hour   Intake              680 ml   Output              800 ml   Net             -120 ml       EXAM:  General:  No obvious distress                                                                                        Lungs:   Clear to auscultation bilaterally, diminished in bases. Incision:  No erythema, drainage or swelling   Heart:  Regular rate and rhythm, S1, S2 normal, no murmur, click, rub or gallop. Abdomen:   Softer, no distention. Bowel sounds active. No masses,  No organomegaly. Extremities:  No edema. PPP. Neurologic:  Gross motor and sensory apparatus intact.      Labs:   Recent Labs      18   0649  18   0422   WBC   --   8.3   HGB   --   8.9*   HCT   --   29.6*   PLT   --   254   NA   -- 138   K   --   5.3*   BUN   --   14   CREA   --   1.01   GLU   --   74   GLUCPOC  89   --         Assessment:     Principal Problem:    S/P CABG x 3 (2018)      Overview: Coronary Artery Bypass Grafting x 3, LIMA to LAD, RSVG to OM, RSVG to RCA      Right GSV EVH    Active Problems:    CAD, multiple vessel (2018)      CAD (coronary artery disease) (2018)         Plan/Recommendations/Medical Decision Makin. S/p CABG x 3:  On asa, statin, and BB. 2. HTN: Increase BB, losartan. PRN hydralazine, Monitor   3. Atelectasis/wheezing: Increase IS and activity as tolerated. Cont PO lasix. Cont albuterol q6h nebs, steroid nebs. 4. DM: Hgba1c 7.4. DTC Following. cardiac diet consistent carb -- NO JUICE. Cont BS ACHS, SSI. Need to resume metformin when appropriate. Cont [place] and [place], resumed glipizide 5 mg PO Daily (was taking 10 mg BID at home). Cont Lantus 25 units, on mealtime 3 units. 5. Acute anemia s/t post op blood loss & iron deficiency/B12 def: Recently had EGD/colonscopy after discovery of anemia during hospitalization. Showed gastritis. Cont pepcid. Cont IV iron - completed 5 doses, PO L07, folic acid and Vit C(caution d/t renal insuff). Monitor H/H. Cont epogen 3x/week until discharge. Much improved after 1 unit pRBC . 6. Hyperlipidemia: cont statin. 7. Renal insufficiency: Cr stable. Resumed ARB -- watch closely! Monitor. Avoid nephrotoxic meds. Known recently to have hyperkalemia on outpt labs. K slightly elevated today, Dr. Melany Vasquez aware. Can repeat labs in office but pt wife reports K is elevated when he has labs drawn at [facility]. Postop urinary retention:  Vasquez removed 18, pt reports he is voiding. On flomax. Cont lasix. 9. Distended abd/postop ileus: abd less distended, soft, +BM. Cont miralax. Stop pericolace, reglan. MOBILIZE. Limit narcotics. 10.  Dispo: PT/OT.  Home with home health today    Signed By: Celso Santiago NP

## 2018-07-18 NOTE — DISCHARGE SUMMARY
[facility] Discharge Summary     Patient ID:  Beba Lee  [id number]  78 y.o.  1950    Admit date: 7/9/2018    Discharge date: 7/18/2018     Admitting Physician: Tera Wood MD     Referring Cardiologist:  Wicho Springer MD    PCP:  Madisyn Modi MD    Admitting Diagnoses:   1. CAD    Discharge Diagnoses:     Hospital Problems  Date Reviewed: 6/28/2018          Codes Class Noted POA    CAD (coronary artery disease) ICD-10-CM: I25.10  ICD-9-CM: 414.00  7/9/2018 Unknown        * (Principal)S/P CABG x 3 ICD-10-CM: Z95.1  ICD-9-CM: V45.81  7/9/2018 Unknown    Overview Signed 7/9/2018 11:20 AM by SCAR Chaney     Coronary Artery Bypass Grafting x 3, LIMA to LAD, RSVG to OM, RSVG to RCA  Right GSV EVH             CAD, multiple vessel ICD-10-CM: I25.10  ICD-9-CM: 414.00  6/22/2018 Yes              Discharged Condition: improved    Disposition: home, see patient instructions for treatment and plan    Procedures for this admission:  Procedure(s):  CORONARY ARTERY BYPASS GRAFT x 3 with Left Internal Mammary Artery and Right Greater Saphenous Vein - Transesphageal EchoCardiogram and EpiAortic Ultrsound performed by Dr. Leo Iron    Discharge Medications:      My Medications      START taking these medications       Instructions Each Dose to Equal   Morning Noon Evening Bedtime    acetaminophen 325 mg tablet   Commonly known as:  TYLENOL       Your last dose was: Your next dose is: Take 2 Tabs by mouth every six (6) hours as needed for Pain. May purchase over the counter    650 mg                        ascorbic acid (vitamin C) 500 mg tablet   Commonly known as:  VITAMIN C       Your last dose was: Your next dose is: Take 1 Tab by mouth daily. 500 mg                        aspirin 81 mg chewable tablet       Your last dose was: Your next dose is: Take 1 Tab by mouth daily.     81 mg                        cyanocobalamin 500 mcg tablet   Commonly known as:  VITAMIN B12       Your last dose was: Your next dose is: Take 1 Tab by mouth daily. 500 mcg                        ferrous sulfate 324 mg (65 mg iron) tablet       Your last dose was: Your next dose is: Take 1 Tab by mouth Daily (before breakfast). Indications: Iron Deficiency Anemia    324 mg                        folic acid 1 mg tablet   Commonly known as:  FOLVITE       Your last dose was: Your next dose is: Take 1 Tab by mouth daily. 1 mg                        furosemide 40 mg tablet   Commonly known as:  LASIX       Your last dose was: Your next dose is: Take 1 Tab by mouth daily for 5 days. 40 mg                        insulin glargine 100 unit/mL injection   Commonly known as:  LANTUS       Your last dose was: Your next dose is:              25 Units by SubCUTAneous route daily. 25 Units                        oxyCODONE-acetaminophen 5-325 mg per tablet   Commonly known as:  PERCOCET       Your last dose was: Your next dose is: Take 1 Tab by mouth every four (4) hours as needed for Pain. Max Daily Amount: 6 Tabs. 1 Tab                        senna-docusate 8.6-50 mg per tablet   Commonly known as:  PERICOLACE       Your last dose was: Your next dose is: Take 1 Tab by mouth daily. 1 Tab                          CHANGE how you take these medications       Instructions Each Dose to Equal   Morning Noon Evening Bedtime    glipiZIDE 10 mg tablet   Commonly known as:  GLUCOTROL   What changed:  See the new instructions. Your last dose was: Your next dose is: Take 0.5 Tabs by mouth two (2) times a day.  Different dose than what you were taking    5 mg                        metoprolol tartrate 100 mg IR tablet   Commonly known as:  LOPRESSOR   What changed:    - medication strength  - how much to take  - additional instructions Your last dose was: Your next dose is: Take 1 Tab by mouth every twelve (12) hours for 60 days. Different dose than what you were taking    100 mg                          CONTINUE taking these medications       Instructions Each Dose to Equal   Morning Noon Evening Bedtime    atorvastatin 20 mg tablet   Commonly known as:  LIPITOR       Your last dose was: Your next dose is: Take 1 Tab by mouth nightly. 20 mg                        glucose blood VI test strips strip   Commonly known as:  ONETOUCH VERIO       Your last dose was: Your next dose is:              Check sugar once daily                         hydroCHLOROthiazide 25 mg tablet   Commonly known as:  HYDRODIURIL       Your last dose was: Your next dose is: Take 25 mg by mouth daily (after breakfast). 25 mg                        JANUVIA 50 mg tablet   Generic drug:  SITagliptin       Your last dose was: Your next dose is:              TAKE ONE TABLET BY MOUTH ONCE DAILY                         lancets 33 gauge Misc   Commonly known as: One Touch Delica       Your last dose was: Your next dose is:              Check sugar once daily                         losartan 50 mg tablet   Commonly known as:  COZAAR       Your last dose was: Your next dose is: Take 2 Tabs by mouth daily. 100 mg                        pantoprazole 40 mg tablet   Commonly known as:  PROTONIX       Your last dose was: Your next dose is: Take 40 mg by mouth daily.     40 mg                          STOP taking these medications          amiodarone 400 mg tablet   Commonly known as:  PACERONE           chlorhexidine 0.12 % solution   Commonly known as:  PERIDEX           metFORMIN 1,000 mg tablet   Commonly known as:  GLUCOPHAGE           mupirocin 2 % ointment   Commonly known as:  BACTROBAN           nitroglycerin 0.4 mg SL tablet   Commonly known as:  NITROSTAT                Where to Get Your Medications      These medications were sent to [address], [address] [facility]  [address] [facility]     Phone:  [phone]     ascorbic acid (vitamin C) 500 mg tablet    aspirin 81 mg chewable tablet    cyanocobalamin 500 mcg tablet    ferrous sulfate 324 mg (65 mg iron) tablet    folic acid 1 mg tablet    furosemide 40 mg tablet    glipiZIDE 10 mg tablet    insulin glargine 100 unit/mL injection    losartan 50 mg tablet    metoprolol tartrate 100 mg IR tablet    senna-docusate 8.6-50 mg per tablet         Information on where to get these meds will be given to you by the nurse or doctor. ! Ask your nurse or doctor about these medications     acetaminophen 325 mg tablet    oxyCODONE-acetaminophen 5-325 mg per tablet           HPI:  Annette Love is a 78 y.o. male  Who was referred for CAD by Dr. Jamal Bravo. Pt's PMH includes CAD (Prior stents), DM2, HTN, hyperlipidemia, renal insufficiency. The patient has been experiencing progressive DONALDSON over the last several months. The SOB has been getting worse and occurs with less activity. The SOB resolves with rest. The pt has a history of stents to LAD and CX in 2016 for which he takes chronic plavix, last dose 6/21/18. He had a positive EST which led to cardiac cath with results below. He denies chest pain, syncope, near syncope, palpitations. He has had a slow drop in Hgb since 3/18, denies nausea, vomiting, melena, hematochezia. He does experience leg cramps mostly at night, not with activity. Pt was discharged home from hospital and had outpatient EGD/colonscopy that showed gastritis and was recommended PPI. Pt was seen in office today in follow up from hospital discharge. He denies CP, SOB, dizziness or edema. He does feel fatigue with exertion/stairs. He saw Dr. Bradley Sarabia 7/2 for anemia workup and has labwork pending.      [facility] Course:   On 7/9/18 the pt underwent a CABG x 3, performed by Dr. Rica Weiss. Please see operative report for full details. He was transferred to the ICU in stable condition on the following drips: amiodarone, precedex, insulin and neosynephrine. He was extubated on 7/9/18 at 1338. POD#8: 1. S/p CABG x 3:  On asa, statin, and BB. 2. HTN: Increase BB, losartan. PRN hydralazine, Monitor   3. Atelectasis/wheezing: Increase IS and activity as tolerated. Cont PO lasix. Cont albuterol q6h nebs, steroid nebs. 4. DM: Hgba1c 7.4. DTC Following. cardiac diet consistent carb -- NO JUICE. Cont BS ACHS, SSI. Need to resume metformin when appropriate. Cont [place] and [place], resumed glipizide 5 mg PO Daily (was taking 10 mg BID at home). Cont Lantus 25 units, on mealtime 3 units. 5. Acute anemia s/t post op blood loss & iron deficiency/B12 def: Recently had EGD/colonscopy after discovery of anemia during hospitalization. Showed gastritis. Cont pepcid. Cont IV iron - completed 5 doses, PO B40, folic acid and Vit C(caution d/t renal insuff). Monitor H/H. Cont epogen 3x/week until discharge. Much improved after 1 unit pRBC 7/13. 6. Hyperlipidemia: cont statin. 7. Renal insufficiency: Cr stable. Resumed ARB -- watch closely! Monitor. Avoid nephrotoxic meds. Known recently to have hyperkalemia on outpt labs. K slightly elevated today, Dr. Rica Weiss aware. Can repeat labs in office but pt wife reports K is elevated when he has labs drawn at [facility]. Postop urinary retention:  Vasquez removed 7/13/18, pt reports he is voiding. On flomax. Cont lasix. 9. Distended abd/postop ileus: abd less distended, soft, +BM. Cont miralax. Stop pericolace, reglan. MOBILIZE. Limit narcotics. 10.  Dispo: PT/OT. Home with home health today    Referral to outpatient cardiac rehab made.      Discharge Vital Signs:   Visit Vitals    /63 (BP 1 Location: Right arm, BP Patient Position: At rest)    Pulse 79    Temp 97.9 °F (36.6 °C)    Resp 18    Ht 5' 3\" (1.6 m)    Wt 141 lb 15.6 oz (64.4 kg)    SpO2 100%    BMI 25.15 kg/m2       Labs:   Recent Labs      07/18/18   0649  07/18/18   0422   WBC   --   8.3   HGB   --   8.9*   HCT   --   29.6*   PLT   --   254   NA   --   138   K   --   5.3*   BUN   --   14   CREA   --   1.01   GLU   --   74   GLUCPOC  89   --        Diagnostics:   PA/lateral: Left lower lobe atelectasis or infiltrate with pleural effusion, Small right pleural effusion, new. Patient Instructions/Follow Up Care:  Discharge instructions were reviewed with the patient and family present. Questions were also answered at this time. Prescriptions and medications were reviewed. The patient has a follow up appointment with the Nurse Practitioner or Physician's Assistant on 7/23/18 at 11:00 am and with Dr. Anant Montaño on 8/13/18 at 1:15 pm. The patient was also instructed to follow up with his primary care physician as needed. The patient and family were encouraged to call with any questions or concerns.        Signed:  Lee Ann Jacobs NP  7/18/2018  9:29 AM

## 2018-07-18 NOTE — PROGRESS NOTES
Cardiac Surgery Care Coordinator-  Met with Star Love and his wife,  reviewed plan of care and discharge instructions. Reinforced 5 lb weight restriction, sternal precautions and continued use of the incentive spirometer. .  Reviewed the importance of daily temp and weight monitoring, discussed incisional care and reviewed signs and symptoms of infection. Red reminder bracelet on right wrist, reviewed purpose of the bracelet and when to call the MD.  Reminded pt of appts and encouraged participation in the Cardiac Wellness and rehab program after discharge. Encouraged Star Love to verbalize and emotional support given. Star Love is without questions or concerns at this time.  Nicole Dowd RN

## 2018-07-18 NOTE — PROGRESS NOTES
1930- Bedside and Verbal shift change report given to Brandie Noel (oncoming nurse) by Tiara Scott (offgoing nurse). Report included the following information SBAR, Kardex, Intake/Output, MAR, Recent Results and Cardiac Rhythm NSR. Summary- Sternal precautions observed. Difficulty Sleep- no further complaints. Uneventful night    0730- Bedside and Verbal shift change report given to Tiara Scott (oncoming nurse) by Nuha Drummond (offgoing nurse). Report included the following information SBAR, Kardex, Intake/Output, MAR, Recent Results and Cardiac Rhythm NSR.

## 2018-07-18 NOTE — DIABETES MGMT
DTC Cardiac surgery progress note    Recommendations/ Comments: Chart reviewed for follow up    If appropriate, please consider:  - Decreasing Lantus to 22 units to prevent hypoglycemia, BG on chemistry this morning was 74 mg/dl    Current hospital DM medication: Humalog for correction, normal sensitivity scale, Sitagliptin 50 mg daily, Humalog 3 units AC, Glipizide 5 mg BID and Lantus 25 units daily     Chart reviewed on Neal Kendall. POD 7 CABG x3    Patient is a 78 y.o. male with hx Type 2 Diabetes on Glipizide 10 mg BID, Sitagliptin 50 mg daily and Metformin 1000 mg BID  at home. A1c:   Lab Results   Component Value Date/Time    Hemoglobin A1c 7.4 (H) 06/22/2018 01:53 PM    Hemoglobin A1c 7.0 (H) 03/16/2018 09:28 AM       Recent Glucose Results:   Lab Results   Component Value Date/Time    GLU 74 07/18/2018 04:22 AM    GLUCPOC 89 07/18/2018 06:49 AM    GLUCPOC 168 (H) 07/17/2018 09:17 PM    GLUCPOC 110 (H) 07/17/2018 04:07 PM        Lab Results   Component Value Date/Time    Creatinine 1.01 07/18/2018 04:22 AM     Estimated Creatinine Clearance: 57.9 mL/min (based on Cr of 1.01). Active Orders   Diet    DIET CARDIAC Regular; Consistent Carb 1800kcal        PO intake:   Patient Vitals for the past 72 hrs:   % Diet Eaten   07/18/18 0737 100 %   07/17/18 1514 100 %   07/17/18 1104 100 %   07/17/18 0802 100 %   07/16/18 1151 70 %   07/16/18 0742 70 %   07/15/18 1643 50 %   07/15/18 1134 70 %       Will continue to follow as needed.     Thank you  Colton Pope RD

## 2018-07-19 ENCOUNTER — PATIENT OUTREACH (OUTPATIENT)
Dept: FAMILY MEDICINE CLINIC | Age: 68
End: 2018-07-19

## 2018-07-19 ENCOUNTER — HOME CARE VISIT (OUTPATIENT)
Dept: SCHEDULING | Facility: HOME HEALTH | Age: 68
End: 2018-07-19
Payer: MEDICARE

## 2018-07-19 ENCOUNTER — TELEPHONE (OUTPATIENT)
Dept: CASE MANAGEMENT | Age: 68
End: 2018-07-19

## 2018-07-19 PROCEDURE — 3331090001 HH PPS REVENUE CREDIT

## 2018-07-19 PROCEDURE — 400013 HH SOC

## 2018-07-19 PROCEDURE — 3331090002 HH PPS REVENUE DEBIT

## 2018-07-19 PROCEDURE — G0151 HHCP-SERV OF PT,EA 15 MIN: HCPCS

## 2018-07-19 PROCEDURE — G0299 HHS/HOSPICE OF RN EA 15 MIN: HCPCS

## 2018-07-19 NOTE — TELEPHONE ENCOUNTER
Cardiac Surgery Discharge - Follow up call placed to [address] to Mrs Marisa Martinez,  Reviewed plan of care after discharge and encouraged her to verbalize. Discussed precautions and reviewed medications, patient without questions regarding medications. Encouraged continued use of the incentive spirometer. Confirmed follow up appts and reinforced importance of wearing red reminder bracelet. Sarbjit Timmons is without questions or concerns. Mrs Marisa Martinez stated the home health nurse is visiting at this time.   Katiuska Rodriguez RN

## 2018-07-19 NOTE — PROGRESS NOTES
Per [facility] report, patient admitted to [facility] 7/9-7/18. Had CABG done per . CAD, multiple vessel. Discharged to home with [facility] for PT/OT. Referred to outpatient cardiac rehab. F/u with NP or pA of  on 7/23 at 11am and Herb Gilbert 8/13 at 1:15pm.  Called patient today to check on him. LM requesting he call me back. Will continue to try and reach.  7/20-spoke to wife. Says he is doing better after triple bypass. SOB is better, not much strength but to be expected. She is checking his weight q day and recording. Reminded of parameters- if gains more than 3 lb in 2 days or 5lbs hector week, needs to report asap. Confirmed appt above with Dr. Paolo Cardoza. Reminded to call if any questions/concerns. NN gave contact information.

## 2018-07-20 VITALS
HEART RATE: 68 BPM | SYSTOLIC BLOOD PRESSURE: 130 MMHG | DIASTOLIC BLOOD PRESSURE: 62 MMHG | TEMPERATURE: 97.1 F | OXYGEN SATURATION: 97 % | RESPIRATION RATE: 18 BRPM

## 2018-07-20 VITALS
DIASTOLIC BLOOD PRESSURE: 50 MMHG | OXYGEN SATURATION: 98 % | HEART RATE: 66 BPM | TEMPERATURE: 97.6 F | RESPIRATION RATE: 16 BRPM | SYSTOLIC BLOOD PRESSURE: 110 MMHG

## 2018-07-20 PROCEDURE — 3331090002 HH PPS REVENUE DEBIT

## 2018-07-20 PROCEDURE — 3331090001 HH PPS REVENUE CREDIT

## 2018-07-20 NOTE — ADT AUTH CERT NOTES
Utilization Review           Coronary Artery Bypass Graft (CABG) - Care Day 8 (7/16/2018) by Gucci Sainz RN        Review Status Review Entered       Completed 7/16/2018       Details              Care Day: 8 Care Date: 7/16/2018 Level of Care: Step Down       Guideline Day 3        Level Of Care       ( ) Transfer to intermediate care       7/16/2018 2:55 PM EDT by John Ortiz         step down                     Clinical Status       (X) * Dangerous arrhythmia absent       7/16/2018 2:55 PM EDT by John Ortiz         none noted              (X) * Pain absent or managed              Activity       (X) * Minimal ambulatory activity       7/16/2018 2:55 PM EDT by John Ortiz         PT/OT                     Routes       (X) * Oral medications       (X) * Advance diet       7/16/2018 2:55 PM EDT by John Ortiz         cardiac diet                     Interventions       (X) * Chest tube absent       7/16/2018 2:55 PM EDT by John Ortiz         none noted              (X) * Central lines absent       7/16/2018 2:55 PM EDT by John Ortiz         none noted                     Medications       (X) * Epidural analgesia absent [J]       7/16/2018 2:55 PM EDT by John Ortiz         none noted              (X) Aspirin       7/16/2018 2:55 PM EDT by John Ortiz         ASA 81mg PO daily              (X) Beta-blocker       7/16/2018 2:55 PM EDT by John rOtiz         Lopressor 50mg PO Q12h              (X) Statin medication       7/16/2018 2:55 PM EDT by John Ortiz         Lipitor 20mg PO bedtime              (X) Antihypertensive medication if needed       7/16/2018 2:55 PM EDT by John Ortiz         cozaar 50mg PO daily                     7/16/2018 2:55 PM EDT by John Ortiz       Subject: Additional Clinical Information       7/16/18Vitals: 98.3, 82, 18, 111/47, 98%Meds: ofirmev 1000mg IV X1, albuterol neb BID, brovana neb BID, pulmicort neb BID, ASA 81mg PO daily, Lipitor 20mg PO bedtime, tessalon 200mg PO TID, vitamin B12 PO daily, lovenox 40mg SC Q24h, epogen 6000 units SC X1, Pepcid 20mg PO daily, folvite 1mg PO daily, lasix 40mg PO daily, mag-ox 400mg PO BID, cozaar 50mg PO daily, Lopressor 50mg PO Q12h, Januvia 50mg PO daily, Flomax 0.4mg PO daily  labs: Hgb 8.0, hct 26.6, BUN 22plan: cardiac diet, I&O, SCDs, oximetry spot check, PT/OT, daily weight, wound care daily                                   * Milestone              Additional Notes       18       Cardiothoracic Surgery:       Pt seen with Dr. Brenda Howe, pt sitting up in chair, looks well today.  On RA.   PT still recommending rehab       Plan/Recommendations/Medical Decision Makin. S/p CABG x 3:  On asa, statin, and BB.         2. HTN: Cont BB, losartan. if elevated again will add norvasc. PRN hydralazine, Monitor        3. Atelectasis/wheezing: Increase IS and activity as tolerated.  Cont PO lasix.  Cont albuterol q6h nebs, steroid nebs.       4. DM: Hgba1c 7.4. DTC Following.  Resume cardiac diet consistent carb -- NO JUICE.  Cont BS ACHS, SSI.  Need to resume metformin/glipizide when appropriate. Resume Nicole Headley now that ileus resolved.  Lantus added 18 - 32 units, on mealtime 3 units as well       5. Acute anemia s/t post op blood loss & iron deficiency/B12 def: Recently had EGD/colonscopy after discovery of anemia during hospitalization.  Showed gastritis.  Cont pepcid.   Cont IV iron - completed 5 doses, PO M77, folic acid and Vit C(caution d/t renal insuff).  Monitor H/H.  Pt lethargic/symptomatic with activity. Cont epogen 3x/week.   Much improved after 1 unit pRBC .        6. Hyperlipidemia: cont statin.       7. Renal insufficiency: Cr stable.  Resumed ARB -- watch closely! Monitor. Avoid nephrotoxic meds.   Known recently to have hyperkalemia on outpt labs.  K stable today.        8. Postop urinary retention:  Vasquez removed 18, pt reports he is voiding. On flomax. Cont lasix.       9. Distended abd/postop ileus: abd less distended, soft, +BM. Cont miralax. Stop pericolace, reglan.  MOBILIZE.  Limit narcotics.        10.  Dispo: PT/OT. Magda Aguero need inpt rehab--pending.                       PT:       Pt received sitting up in armless chair at [facility] w/ family member. Pt agreeable to PT, reports he had been up and amb in hallway at least 3x prior to therapy arrival. Pt amb 150 FT this session w/o AD, Continue to note path deviations and scissor-like gait (no major LOB this session but gait mild-moderately unsteady). Pt reports amb at increased pace at baseline compared to pace he has been amb this session. Pt returned to chair at [facility] (as was received), completed cardiac UE exercises w/ Supervision and remained in chair w/call bell in reach. Pt able to recall 3/3 sternal precautions and demonstrated adherence during session (pt family member present in room reports pt using arms to push).  Pt w/ all needs in reach and met. PT to continue to follow. May benefit from Rehab to address gait deficits above for improvement w/ safety and stability.              OT: Progressing well, agreeable to up and into bathroom with CGA, VCs for bed mobility technique, SBA/CGA for toilet transfer and standing activity at sink, no LOB but fair steadiness with navigation of small area. S for LB dressing seated, educated on activity modification with sternal precautions, cardiac exercise verbal review and ADL retraining, patient would benefit from short rehab stay versus home with family assist and transition to cardiac OP rehab.        Patient eager to return home-brother is coming in town to assist- wife at home as able but not in room today.              CM:       CM received phone call from Irineo Bosworth with [facility]; patient's insurance denied authorization. CM informed NP, peer to peer declined. Patient to continue to work towards home health.  CM to inform patient and family           Coronary Artery Bypass Graft (CABG) - Care Day 5 (7/13/2018) by Marene Peabody        Review Status Review Entered       Completed 7/13/2018       Details              Care Day: 5 Care Date: 7/13/2018 Level of Care: Step Down       Guideline Day 2        Level Of Care       (X) Possible transfer to intermediate care              Clinical Status       (X) * Procedure completed       7/13/2018 4:59 PM EDT by Karime Cornelius         Procedure:  Procedure(s): CORONARY ARTERY BYPASS GRAFT x 3 with Left Internal Mammary Artery and Right Greater Saphenous Vein - Transesphageal EchoCardiogram and EpiAortic Ultrsound.              (X) * Hemodynamic stability       7/13/2018 4:59 PM EDT by Karime Cornelius         98.7  °F (37.1  °C) 87 . 163/60 -- -- r 22 sat 98 % Nasal cannula 2 l/min --              (X) * No evidence of myocardial ischemia       (X) * Mechanical ventilation absent              Activity       (X) * Up to chair       7/13/2018 4:59 PM EDT by Karime Cornelius         ambulate in room                     Routes       (X) * Clear liquid diet, advance as tolerated       7/13/2018 4:59 PM EDT by Karime Cornelius         clear liquids              (X) Oral or parenteral medications       7/13/2018 4:59 PM EDT by Karime Cornelius         Brovana neb & pulmicort nebs bid. ,  Lovnox sq q 24h, EPO sq MWF. , Lasix 40 mg po daily, Venofer iv daily, COZAAR po daily.  Reglan iv q 6h, lopressor po q 12h, Januvia po x 1.                     Interventions       (X) Oxygen       7/13/2018 4:59 PM EDT by Karime Cornelius         Nasal cannula 2 l/min              ( ) Assess cardiac biomarkers       7/13/2018 4:59 PM EDT by Karime Cornelius         labs; bun 32, ca 8.3, albumin 2.6, AST 55.        * HH 6.6/ 21.8              (X) Glucose control       7/13/2018 4:59 PM EDT by Karime Cornelius         Insulin sq scheduled and (ssi X  2 today)                     Medications       (X) Aspirin       7/13/2018 4:59 PM EDT by Karime Cornelius         81 mg po daily            (X) Beta-blocker       2018 4:59 PM EDT by Ryland Foster         lopressor po              (X) Statin medication       2018 4:59 PM EDT by Ryland Foster         lipitor po q hs              (X) Antihypertensive medication if needed       2018 4:59 PM EDT by Ryland Foster         apresoline 20 mg iv                     2018 4:59 PM EDT by Ryland Foster       Subject: Additional Clinical Information                labs; bun 32, ca 8.3, albumin 2.6, AST 55.               * HH 6.6/ 21.8                CXR; Left lower lobe atelectasis or infiltrate with pleural effusion, worsened sinceprior study 2018. .   . Small right pleural effusion, new.                                              * Milestone              Additional Notes       -----DC Plan ;  facility for rehab .        ------Blood Txusn ; 1 unit PRBC today.        ------PER [facility]       Principal Problem:         S/P CABG x 3 (2018)           Overview: Coronary Artery Bypass Grafting x 3, LIMA to LAD, RSVG to OM, RSVG to RCA           Right V [facility]               Active Problems:         CAD, multiple vessel (2018)          CAD (coronary artery disease) (2018)                 Plan/Recommendations/Medical Decision Makin. S/p CABG x 3:  On asa, statin, and BB.         2. HTN: Cont BB, resume low dose losartan.  PRN hydralazine, Monitor        3. Atelectasis/wheezing: wean oxygen for 02 sat > 92%. Increase IS and activity as tolerated.  Cont PO lasix.  Cont albuterol q6h nebs, steroid nebs.   PA/lat today.         4. DM: Hgba1c 7.4.  DTC consult. Resume Madonna Miu clear liquids -- NO JUICE.  Cont BS ACHS, SSI.  Need to resume metformin/glipizide when appropriate.    May need some lispro meal coverage?  Discussed w/ DTC.       5.  Acute anemia s/t post op blood loss & iron deficiency/B12 def: Recently had EGD/colonscopy after discovery of anemia during hospitalization.  Showed gastritis.  Cont pepcid.   Cont IV iron, PO O61, folic acid and Vit C(caution d/t renal insuff).  Monitor H/H.  Pt lethargic/symptomatic with activity.  Give 1 unit pRBC now.   Start epogen 3x/week.        6. Hyperlipidemia: cont statin.       7. Renal insufficiency: Creat back down.  Resume ARB -- watch closely! Monitor. Avoid nephrotoxic meds.   Known recently to have hyperkalemia on outpt labs.  K stable today.        8. Postop urinary retention:  Vasquez replaced 7/11 in evening.  On flomax.  void trial Friday.  Cont lasix.        9. Distended abd/postop ileus: Repeat KUB Saturday.  cont reglan, pericolace, miralax.  MOBILIZE.  Limit narcotics.        10.  Dispo: PT/OT. Rich Moraes need inpt rehab--pending.                 Update:  In discussion w/ DTC this morning, januvia resumed. Farrah Mancini now stop d/t concerns for GI slowing in pts w/ ileus.  Already got dose today.  Started lispro 3 units TID. Farrah Mancini need lantus for tonight-start 28 units w/ dinner.   Strongly discussed not drinking sugary beverages

## 2018-07-21 ENCOUNTER — HOME CARE VISIT (OUTPATIENT)
Dept: SCHEDULING | Facility: HOME HEALTH | Age: 68
End: 2018-07-21
Payer: MEDICARE

## 2018-07-21 PROCEDURE — G0300 HHS/HOSPICE OF LPN EA 15 MIN: HCPCS

## 2018-07-21 PROCEDURE — 3331090001 HH PPS REVENUE CREDIT

## 2018-07-21 PROCEDURE — 3331090002 HH PPS REVENUE DEBIT

## 2018-07-22 PROCEDURE — 3331090001 HH PPS REVENUE CREDIT

## 2018-07-22 PROCEDURE — 3331090002 HH PPS REVENUE DEBIT

## 2018-07-23 ENCOUNTER — HOME CARE VISIT (OUTPATIENT)
Dept: SCHEDULING | Facility: HOME HEALTH | Age: 68
End: 2018-07-23
Payer: MEDICARE

## 2018-07-23 ENCOUNTER — TELEPHONE (OUTPATIENT)
Dept: CARDIAC REHAB | Age: 68
End: 2018-07-23

## 2018-07-23 ENCOUNTER — OFFICE VISIT (OUTPATIENT)
Dept: CARDIOTHORACIC SURGERY | Age: 68
End: 2018-07-23

## 2018-07-23 VITALS
HEIGHT: 63 IN | RESPIRATION RATE: 16 BRPM | DIASTOLIC BLOOD PRESSURE: 52 MMHG | WEIGHT: 134 LBS | BODY MASS INDEX: 23.74 KG/M2 | HEART RATE: 65 BPM | SYSTOLIC BLOOD PRESSURE: 104 MMHG | OXYGEN SATURATION: 98 % | TEMPERATURE: 98.2 F

## 2018-07-23 VITALS
OXYGEN SATURATION: 97 % | WEIGHT: 133.5 LBS | DIASTOLIC BLOOD PRESSURE: 58 MMHG | BODY MASS INDEX: 23.65 KG/M2 | SYSTOLIC BLOOD PRESSURE: 110 MMHG | HEART RATE: 54 BPM | TEMPERATURE: 98.1 F

## 2018-07-23 DIAGNOSIS — Z95.1 S/P CABG X 3: Primary | ICD-10-CM

## 2018-07-23 PROCEDURE — 3331090002 HH PPS REVENUE DEBIT

## 2018-07-23 PROCEDURE — 3331090001 HH PPS REVENUE CREDIT

## 2018-07-23 NOTE — PROGRESS NOTES
Patient: Janis Reyes   Age: 78 y.o. Patient Care Team:  Brissa Meehan MD as PCP - General (Family Practice)  Tara Jaime MD (Cardiology)  Irine Lennox, MD (Gastroenterology)  Madelin Doyle, RN as Ambulatory Care Navigator ([facility])  Roxy Goodpasture, MD (Cardiothoracic Surgery)  Kay Maravilla, RN as Ambulatory Care Navigator (Cardiology)  Celia Cedillo, RN as Ambulatory Care Navigator    Diagnosis: The encounter diagnosis was S/P CABG x 3. Problem List:   Patient Active Problem List   Diagnosis Code    Dyslipidemia, goal LDL below 100 E78.5    Cramp of limb R25.2    Deafness H91.90    Essential hypertension with goal blood pressure less than 130/85 I10    Type 2 diabetes mellitus with hyperglycemia (HCC) E11.65    Nonrheumatic aortic valve stenosis I35.0    Bruit of right carotid artery R09.89    Preop general physical exam Z01.818    Colon cancer screening Z12.11    Dyspnea on exertion R06.09    Coronary artery disease involving native coronary artery of native heart without angina pectoris I25.10    Hypercholesteremia E78.00    Hypertension, essential I10    Statin intolerance Z78.9    NSTEMI (non-ST elevated myocardial infarction) ([facility]) I21.4    CAD, multiple vessel I25.10    CAD (coronary artery disease) I25.10    S/P CABG x 3 Z95.1        Date of Surgery: 7/9/18     Surgery: CABG x 3     HPI: Pt is here for postop follow up. Pt doing well. Staying active, walking outside. Appetite good, bowels regular. Fasting BS 100s. Not checking post meal.   Denies SOB, dizziness, edema or CP. Current Medications:   Current Outpatient Prescriptions   Medication Sig Dispense Refill    glipiZIDE (GLUCOTROL) 10 mg tablet Take 0.5 Tabs by mouth two (2) times a day. Different dose than what you were taking 180 Tab 2    metoprolol tartrate (LOPRESSOR) 100 mg IR tablet Take 1 Tab by mouth every twelve (12) hours for 60 days.  Different dose than what you were taking 60 Tab 1    insulin glargine (LANTUS) 100 unit/mL injection 25 Units by SubCUTAneous route daily. 1 Vial 3    furosemide (LASIX) 40 mg tablet Take 1 Tab by mouth daily for 5 days. 5 Tab 0    acetaminophen (TYLENOL) 325 mg tablet Take 2 Tabs by mouth every six (6) hours as needed for Pain. May purchase over the counter 30 Tab 0    losartan (COZAAR) 50 mg tablet Take 2 Tabs by mouth daily. 60 Tab 1    senna-docusate (PERICOLACE) 8.6-50 mg per tablet Take 1 Tab by mouth daily. 30 Tab 0    aspirin 81 mg chewable tablet Take 1 Tab by mouth daily. 365 Tab 0    cyanocobalamin (VITAMIN B12) 500 mcg tablet Take 1 Tab by mouth daily. 30 Tab 1    folic acid (FOLVITE) 1 mg tablet Take 1 Tab by mouth daily. 30 Tab 1    ascorbic acid, vitamin C, (VITAMIN C) 500 mg tablet Take 1 Tab by mouth daily. 30 Tab 1    ferrous sulfate 324 mg (65 mg iron) tablet Take 1 Tab by mouth Daily (before breakfast). Indications: Iron Deficiency Anemia 30 Tab 1    pantoprazole (PROTONIX) 40 mg tablet Take 40 mg by mouth daily.  atorvastatin (LIPITOR) 20 mg tablet Take 1 Tab by mouth nightly. 30 Tab 3    hydroCHLOROthiazide (HYDRODIURIL) 25 mg tablet Take 25 mg by mouth daily (after breakfast).  JANUVIA 50 mg tablet TAKE ONE TABLET BY MOUTH ONCE DAILY 90 Tab 1    lancets (ONE TOUCH DELICA) 33 gauge misc Check sugar once daily 100 Lancet 1    glucose blood VI test strips (ONETOUCH VERIO) strip Check sugar once daily 100 Strip 1    oxyCODONE-acetaminophen (PERCOCET) 5-325 mg per tablet Take 1 Tab by mouth every four (4) hours as needed for Pain. Max Daily Amount: 6 Tabs. 30 Tab 0       Vitals: Blood pressure 104/52, pulse 65, temperature 98.2 °F (36.8 °C), temperature source Oral, resp. rate 16, height 5' 3\" (1.6 m), weight 134 lb (60.8 kg), SpO2 98 %. Allergies: has No Known Allergies.     Physical Exam:  Wounds: clean, dry, no drainage    Lungs: clear to auscultation bilaterally    Heart: regular rate and rhythm, S1, S2 normal, no murmur, click, rub or gallop    Extremities: no edema, PPP     Assessment/Plan:   1. S/p CABG x 3:  On asa, statin, and BB.    2. HTN: BB, losartan. Monitor   3. Atelectasis/wheezing: Increase IS and activity as tolerated.  Cont PO lasix x 5 days.    4. DM: Hgba1c 7.4. Need to resume metformin when appropriate.  Cont januvia, glipizide 5 mg PO Daily (was taking 10 mg BID at home). Cont Lantus 25 units  5. Acute anemia s/t post op blood loss & iron deficiency/B12 def: Recently had EGD/colonscopy after discovery of anemia during hospitalization.  Showed gastritis.  Cont pepcid.   completed 5 doses IV iron, cont PO iron, PO Q95, folic acid and Vit C(caution d/t renal insuff).  Much improved after 1 unit pRBC 7/13. Discussed pt should have PCP check B12/folate/iron levels in about 1 month. May need to see Hematology again at some point, but PCP could check labs first.    6. Hyperlipidemia: cont statin. 7. Renal insufficiency: on ARB. Can repeat labs in office but pt wife reports K is elevated when he has [facility]. Complete 5 days of lasix, making good UOP. 8. Postop urinary retention:  Vasquez removed 7/13/18, pt reports he is voiding. On flomax. Cont lasix. 9. Distended abd/postop ileus: On pericolace. 10. FU in 2-3 weeks. Pt is ready to start cardiac rehab.      Rehab - yes   Walking: yes  Glucometer: yes  Antibiotic card for valves: n/a

## 2018-07-24 PROCEDURE — 3331090002 HH PPS REVENUE DEBIT

## 2018-07-24 PROCEDURE — 3331090001 HH PPS REVENUE CREDIT

## 2018-07-25 ENCOUNTER — HOME CARE VISIT (OUTPATIENT)
Dept: SCHEDULING | Facility: HOME HEALTH | Age: 68
End: 2018-07-25
Payer: MEDICARE

## 2018-07-25 PROCEDURE — G0300 HHS/HOSPICE OF LPN EA 15 MIN: HCPCS

## 2018-07-25 PROCEDURE — 3331090002 HH PPS REVENUE DEBIT

## 2018-07-25 PROCEDURE — 3331090001 HH PPS REVENUE CREDIT

## 2018-07-26 PROCEDURE — 3331090002 HH PPS REVENUE DEBIT

## 2018-07-26 PROCEDURE — 3331090001 HH PPS REVENUE CREDIT

## 2018-07-27 ENCOUNTER — HOME CARE VISIT (OUTPATIENT)
Dept: SCHEDULING | Facility: HOME HEALTH | Age: 68
End: 2018-07-27
Payer: MEDICARE

## 2018-07-27 ENCOUNTER — TELEPHONE (OUTPATIENT)
Dept: CARDIAC REHAB | Age: 68
End: 2018-07-27

## 2018-07-27 VITALS
WEIGHT: 133 LBS | OXYGEN SATURATION: 99 % | BODY MASS INDEX: 23.56 KG/M2 | TEMPERATURE: 98.1 F | HEART RATE: 80 BPM | DIASTOLIC BLOOD PRESSURE: 56 MMHG | SYSTOLIC BLOOD PRESSURE: 118 MMHG | RESPIRATION RATE: 18 BRPM

## 2018-07-27 PROCEDURE — 3331090001 HH PPS REVENUE CREDIT

## 2018-07-27 PROCEDURE — G0299 HHS/HOSPICE OF RN EA 15 MIN: HCPCS

## 2018-07-27 PROCEDURE — 3331090002 HH PPS REVENUE DEBIT

## 2018-07-27 NOTE — TELEPHONE ENCOUNTER
7/27/2018 Cardiac Rehab: Called Mr. Barney Jane to discuss participation in the Cardiac Rehab Program following CABG on July 9, 2018. Voicemail box full still. Madisyn Medley     7/23/2018 Cardiac Rehab: Called Mr. Barney Jane to discuss participation in the Cardiac Rehab Program following CABG on July 9, 2018. Unable to leave voicemail message due to full mailbox. Will call back Thursday, July 26, 2018.  Madisyn Medley      Electronically signed by Madisyn Medley at 07/23/18 [id number]

## 2018-07-28 VITALS
RESPIRATION RATE: 12 BRPM | HEART RATE: 63 BPM | OXYGEN SATURATION: 97 % | SYSTOLIC BLOOD PRESSURE: 118 MMHG | DIASTOLIC BLOOD PRESSURE: 66 MMHG | TEMPERATURE: 98.3 F

## 2018-07-28 PROCEDURE — 3331090001 HH PPS REVENUE CREDIT

## 2018-07-28 PROCEDURE — 3331090002 HH PPS REVENUE DEBIT

## 2018-07-29 PROCEDURE — 3331090001 HH PPS REVENUE CREDIT

## 2018-07-29 PROCEDURE — 3331090002 HH PPS REVENUE DEBIT

## 2018-07-30 ENCOUNTER — HOME CARE VISIT (OUTPATIENT)
Dept: SCHEDULING | Facility: HOME HEALTH | Age: 68
End: 2018-07-30
Payer: MEDICARE

## 2018-07-30 PROCEDURE — 3331090001 HH PPS REVENUE CREDIT

## 2018-07-30 PROCEDURE — 3331090002 HH PPS REVENUE DEBIT

## 2018-07-30 PROCEDURE — G0299 HHS/HOSPICE OF RN EA 15 MIN: HCPCS

## 2018-07-31 VITALS
TEMPERATURE: 97.5 F | HEART RATE: 57 BPM | WEIGHT: 129 LBS | BODY MASS INDEX: 22.85 KG/M2 | SYSTOLIC BLOOD PRESSURE: 129 MMHG | DIASTOLIC BLOOD PRESSURE: 88 MMHG | RESPIRATION RATE: 16 BRPM | OXYGEN SATURATION: 98 %

## 2018-08-15 ENCOUNTER — TELEPHONE (OUTPATIENT)
Dept: CARDIOLOGY CLINIC | Age: 68
End: 2018-08-15

## 2018-08-15 ENCOUNTER — OFFICE VISIT (OUTPATIENT)
Dept: CARDIOLOGY CLINIC | Age: 68
End: 2018-08-15

## 2018-08-15 VITALS
HEIGHT: 63 IN | RESPIRATION RATE: 16 BRPM | SYSTOLIC BLOOD PRESSURE: 174 MMHG | TEMPERATURE: 98.4 F | OXYGEN SATURATION: 97 % | DIASTOLIC BLOOD PRESSURE: 62 MMHG | HEART RATE: 66 BPM | WEIGHT: 133.5 LBS | BODY MASS INDEX: 23.65 KG/M2

## 2018-08-15 DIAGNOSIS — Z95.1 S/P CABG X 3: Primary | ICD-10-CM

## 2018-08-15 DIAGNOSIS — I10 ESSENTIAL HYPERTENSION WITH GOAL BLOOD PRESSURE LESS THAN 130/85: ICD-10-CM

## 2018-08-15 RX ORDER — HYDROCHLOROTHIAZIDE 25 MG/1
50 TABLET ORAL
Qty: 60 TAB | Refills: 1 | Status: SHIPPED | OUTPATIENT
Start: 2018-08-15 | End: 2019-05-03

## 2018-08-15 NOTE — PROGRESS NOTES
Patient: Beatrice Kirkland   Age: 78 y.o. Patient Care Team:  Betito Crowe MD as PCP - General (Family Practice)  Shilpi Canseco MD (Cardiology)  Justine Villalobos MD (Gastroenterology)  Rod Briscoe, LORI as Ambulatory Care Navigator (Family Practice)  Gucci Samaniego MD (Cardiothoracic Surgery)  Xavier Abrams, RN as Ambulatory Care Navigator (Cardiology)  Tona Nix, RN as Ambulatory Care Navigator    Diagnosis: The encounter diagnosis was S/P CABG x 3. Problem List:   Patient Active Problem List   Diagnosis Code    Dyslipidemia, goal LDL below 100 E78.5    Cramp of limb R25.2    Deafness H91.90    Essential hypertension with goal blood pressure less than 130/85 I10    Type 2 diabetes mellitus with hyperglycemia (HCC) E11.65    Nonrheumatic aortic valve stenosis I35.0    Bruit of right carotid artery R09.89    Preop general physical exam Z01.818    Colon cancer screening Z12.11    Dyspnea on exertion R06.09    Coronary artery disease involving native coronary artery of native heart without angina pectoris I25.10    Hypercholesteremia E78.00    Hypertension, essential I10    Statin intolerance Z78.9    NSTEMI (non-ST elevated myocardial infarction) (HCC) I21.4    CAD, multiple vessel I25.10    CAD (coronary artery disease) I25.10    S/P CABG x 3 Z95.1        Date of Surgery: 7/9/18      Surgery: CABG x 3    HPI: The patient is here for his 1 month post op appointment. He is ambulating well. He denies SOB, weight gain, and edema. BS well controlled. The patient has been increasing his activity, walking at least 2 times a day. He has a good appetite and no constipation. Current Medications:   Current Outpatient Prescriptions   Medication Sig Dispense Refill    glipiZIDE (GLUCOTROL) 10 mg tablet Take 0.5 Tabs by mouth two (2) times a day.  Different dose than what you were taking 180 Tab 2    metoprolol tartrate (LOPRESSOR) 100 mg IR tablet Take 1 Tab by mouth every twelve (12) hours for 60 days. Different dose than what you were taking 60 Tab 1    insulin glargine (LANTUS) 100 unit/mL injection 25 Units by SubCUTAneous route daily. 1 Vial 3    oxyCODONE-acetaminophen (PERCOCET) 5-325 mg per tablet Take 1 Tab by mouth every four (4) hours as needed for Pain. Max Daily Amount: 6 Tabs. 30 Tab 0    acetaminophen (TYLENOL) 325 mg tablet Take 2 Tabs by mouth every six (6) hours as needed for Pain. May purchase over the counter 30 Tab 0    losartan (COZAAR) 50 mg tablet Take 2 Tabs by mouth daily. 60 Tab 1    senna-docusate (PERICOLACE) 8.6-50 mg per tablet Take 1 Tab by mouth daily. 30 Tab 0    aspirin 81 mg chewable tablet Take 1 Tab by mouth daily. 365 Tab 0    cyanocobalamin (VITAMIN B12) 500 mcg tablet Take 1 Tab by mouth daily. 30 Tab 1    folic acid (FOLVITE) 1 mg tablet Take 1 Tab by mouth daily. 30 Tab 1    ascorbic acid, vitamin C, (VITAMIN C) 500 mg tablet Take 1 Tab by mouth daily. 30 Tab 1    ferrous sulfate 324 mg (65 mg iron) tablet Take 1 Tab by mouth Daily (before breakfast). Indications: Iron Deficiency Anemia 30 Tab 1    pantoprazole (PROTONIX) 40 mg tablet Take 40 mg by mouth daily.  atorvastatin (LIPITOR) 20 mg tablet Take 1 Tab by mouth nightly. 30 Tab 3    hydroCHLOROthiazide (HYDRODIURIL) 25 mg tablet Take 25 mg by mouth daily (after breakfast).  JANUVIA 50 mg tablet TAKE ONE TABLET BY MOUTH ONCE DAILY 90 Tab 1    lancets (ONE TOUCH DELICA) 33 gauge misc Check sugar once daily 100 Lancet 1    glucose blood VI test strips (ONETOUCH VERIO) strip Check sugar once daily 100 Strip 1       Vitals: Blood pressure 174/62, pulse 66, temperature 98.4 °F (36.9 °C), temperature source Oral, resp. rate 16, height 5' 3\" (1.6 m), weight 133 lb 8 oz (60.6 kg), SpO2 97 %. Allergies: has No Known Allergies.     Physical Exam:  Wounds: clean, dry, no drainage    Lungs: clear to auscultation bilaterally    Heart: regular rate and rhythm, S1, S2 normal, no murmur, click, rub or gallop    Extremities: normal strength, tone, and muscle mass    Assessment/Plan:   1. S/p CABG x 3:  Cont asa, statin, and BB.    2. HTN: cont BB, losartan,  Increase HCTZ to 50 mg daily. FU with cardiology  3. DM: Hgba1c 7.4. Cont januvia, glipizide 5 mg PO Daily (was taking 10 mg BID at home). Cont Lantus 25 units. BS well controlled. FU with PCP. 4. Acute anemia s/t post op blood loss & iron deficiency/B12 def: Recently had EGD/colonscopy after discovery of anemia during hospitalization.  Showed gastritis.  Cont pepcid.   completed 5 doses IV iron, cont PO iron, PO F64, folic acid and Vit C(caution d/t renal insuff).   Discussed pt should have PCP check B12/folate/iron levels in about 1 month. 5. Hyperlipidemia: cont statin. 6. Renal insufficiency: improved  7. Dispo: FU with cardiology and PCP    Pt is ready to start cardiac rehab. The patient was seen with Dr. Bhargavi Ramirez.     Rehab - y  Walking: y  Glucometer: y  Antibiotic card for valves: n/a

## 2018-08-15 NOTE — TELEPHONE ENCOUNTER
[facility] called to schedule pt within 2-3 weeks with dr Storm Childs.  They ask that you call the pt to schedule   Phone: [phone]

## 2018-08-16 NOTE — TELEPHONE ENCOUNTER
Verified patient with two types of identifiers. Spoke to wife and called to make appt in 2 weeks. She states that someone called them this morning and scheduled this. She will keep the appt for 9/6/18. Wife verbalizes understanding. And will call with any questions or concerns.

## 2018-08-27 ENCOUNTER — OFFICE VISIT (OUTPATIENT)
Dept: FAMILY MEDICINE CLINIC | Age: 68
End: 2018-08-27

## 2018-08-27 VITALS
BODY MASS INDEX: 23.92 KG/M2 | DIASTOLIC BLOOD PRESSURE: 80 MMHG | TEMPERATURE: 98.6 F | HEART RATE: 69 BPM | OXYGEN SATURATION: 98 % | SYSTOLIC BLOOD PRESSURE: 136 MMHG | WEIGHT: 135 LBS | HEIGHT: 63 IN | RESPIRATION RATE: 16 BRPM

## 2018-08-27 DIAGNOSIS — D50.0 BLOOD LOSS ANEMIA: ICD-10-CM

## 2018-08-27 DIAGNOSIS — E11.65 TYPE 2 DIABETES MELLITUS WITH HYPERGLYCEMIA, WITHOUT LONG-TERM CURRENT USE OF INSULIN (HCC): Primary | ICD-10-CM

## 2018-08-27 DIAGNOSIS — I25.10 CAD, MULTIPLE VESSEL: ICD-10-CM

## 2018-08-27 DIAGNOSIS — I10 HYPERTENSION, ESSENTIAL: ICD-10-CM

## 2018-08-27 DIAGNOSIS — E78.5 DYSLIPIDEMIA, GOAL LDL BELOW 100: ICD-10-CM

## 2018-08-27 DIAGNOSIS — Z95.1 S/P CABG X 3: ICD-10-CM

## 2018-08-27 RX ORDER — SIMVASTATIN 40 MG/1
TABLET, FILM COATED ORAL
COMMUNITY
End: 2018-08-27 | Stop reason: ALTCHOICE

## 2018-08-27 RX ORDER — POLYETHYLENE GLYCOL-3350, SODIUM CHLORIDE, POTASSIUM CHLORIDE AND SODIUM BICARBONATE 420; 11.2; 5.72; 1.48 G/438.4G; G/438.4G; G/438.4G; G/438.4G
POWDER, FOR SOLUTION ORAL
COMMUNITY
Start: 2018-06-25 | End: 2018-08-27 | Stop reason: ALTCHOICE

## 2018-08-27 RX ORDER — CALCIUM CARBONATE/VITAMIN D3 600MG-5MCG
TABLET ORAL
COMMUNITY
End: 2018-08-27 | Stop reason: SDUPTHER

## 2018-08-27 RX ORDER — METOPROLOL TARTRATE 50 MG/1
TABLET ORAL
COMMUNITY
Start: 2018-06-25 | End: 2018-08-27 | Stop reason: DRUGHIGH

## 2018-08-27 RX ORDER — GLIPIZIDE 10 MG/1
TABLET ORAL
COMMUNITY
End: 2018-08-27 | Stop reason: SDUPTHER

## 2018-08-27 RX ORDER — BACLOFEN 20 MG
TABLET ORAL
COMMUNITY
End: 2018-08-27 | Stop reason: ALTCHOICE

## 2018-08-27 RX ORDER — DICLOFENAC SODIUM 75 MG/1
TABLET, DELAYED RELEASE ORAL
COMMUNITY
End: 2018-08-27 | Stop reason: ALTCHOICE

## 2018-08-27 RX ORDER — METFORMIN HYDROCHLORIDE 1000 MG/1
TABLET ORAL
COMMUNITY
Start: 2018-05-31 | End: 2018-08-27 | Stop reason: SINTOL

## 2018-08-27 RX ORDER — LOSARTAN POTASSIUM 25 MG/1
TABLET ORAL
COMMUNITY
End: 2018-08-27 | Stop reason: SDUPTHER

## 2018-08-27 RX ORDER — CYCLOBENZAPRINE HCL 5 MG
TABLET ORAL
COMMUNITY
End: 2018-08-27 | Stop reason: ALTCHOICE

## 2018-08-27 RX ORDER — GABAPENTIN 300 MG/1
CAPSULE ORAL
COMMUNITY
End: 2018-08-27 | Stop reason: ALTCHOICE

## 2018-08-27 RX ORDER — HYDROCODONE BITARTRATE AND ACETAMINOPHEN 5; 325 MG/1; MG/1
TABLET ORAL
COMMUNITY
End: 2018-08-27 | Stop reason: SDUPTHER

## 2018-08-27 RX ORDER — LOSARTAN POTASSIUM 100 MG/1
TABLET ORAL
COMMUNITY
Start: 2018-06-19 | End: 2018-08-27 | Stop reason: SDUPTHER

## 2018-08-27 RX ORDER — AMIODARONE HYDROCHLORIDE 400 MG/1
TABLET ORAL
COMMUNITY
Start: 2018-06-22 | End: 2018-08-27 | Stop reason: ALTCHOICE

## 2018-08-27 RX ORDER — AMOXICILLIN 875 MG/1
875 TABLET, FILM COATED ORAL 2 TIMES DAILY
COMMUNITY
Start: 2018-07-02 | End: 2018-08-27 | Stop reason: ALTCHOICE

## 2018-08-27 RX ORDER — RANITIDINE 150 MG/1
CAPSULE ORAL
COMMUNITY
End: 2018-08-27 | Stop reason: ALTCHOICE

## 2018-08-27 RX ORDER — AZELASTINE 1 MG/ML
1 SPRAY, METERED NASAL 2 TIMES DAILY
Qty: 1 BOTTLE | Refills: 0 | Status: SHIPPED | OUTPATIENT
Start: 2018-08-27 | End: 2018-10-23 | Stop reason: ALTCHOICE

## 2018-08-27 RX ORDER — TRAZODONE HYDROCHLORIDE 50 MG/1
25 TABLET ORAL
Qty: 30 TAB | Refills: 2 | Status: SHIPPED | OUTPATIENT
Start: 2018-08-27 | End: 2019-09-24 | Stop reason: SDUPTHER

## 2018-08-27 RX ORDER — ASPIRIN 81 MG/1
TABLET ORAL
COMMUNITY
End: 2018-08-27 | Stop reason: SDUPTHER

## 2018-08-27 NOTE — PATIENT INSTRUCTIONS
Learning About Diabetes and Coronary Artery Disease  How are diabetes and heart disease connected? Many people think diabetes and heart disease go hand in hand. But having diabetes doesn't have to mean that you are going to have a heart attack someday. Healthy living can help prevent many of the problems that come with both diabetes and heart disease. For some people, diabetes can cause problems in your body that may lead to heart disease. Diabetes can make the problems of heart disease worse. Experts do not fully understand how diabetes affects the heart. Many things can lead to heart disease, including high blood sugar, insulin resistance, high cholesterol, and high blood pressure. But lifestyle and genetics may also affect a person's risk. But here's the good news: The good things you're doing to stay healthy with diabetes-eating healthy foods, quitting smoking, getting exercise and more-are also helping your heart. What increases your risk for heart disease? When you have diabetes, your risk for heart disease is even higher if you have:  · High blood pressure, which pushes blood through the arteries with too much force. Over time, this damages the walls of the arteries. · High cholesterol, which causes the buildup of a kind of fat inside the blood vessel walls. This buildup can lower blood flow to the heart muscle and raise your risk for having a heart attack. · Kidney damage, which shares many of the risk factors for heart disease (such as high blood sugar, high blood pressure, and high cholesterol). How can you keep your heart healthy when you have diabetes? Managing your diabetes and keeping your heart healthy are two sides of the same coin. Here are some things you can do. · Test your blood sugar levels and get your diabetes tests on schedule. Try to keep your numbers within your target range. · Keep track of your blood pressure. The target for most people with diabetes is below 140/90. Your doctor will give you a goal that's right for you. If your blood pressure is high, your treatment may also include medicine. Changes in your lifestyle, such as staying at a healthy weight, may also help you lower your blood pressure. · Eat heart-healthy foods. These include fruits, vegetables, whole grains, fish, and low-fat or nonfat dairy foods. Limit sodium, alcohol, and sweets. · If your doctor recommends it, get more exercise. Walking is a good choice. Bit by bit, increase the amount you walk every day. Try for at least 30 minutes on most days of the week. · Do not smoke. Smoking can make diabetes and heart disease worse. If you need help quitting, talk to your doctor about stop-smoking programs and medicines. These can increase your chances of quitting for good. · Your doctor may talk with you about taking medicines for your heart. For example, your doctor may suggest taking a statin or daily aspirin. Where can you learn more? Go to http://pardeep-harjit.info/. Enter X385 in the search box to learn more about \"Learning About Diabetes and Coronary Artery Disease. \"  Current as of: December 7, 2017  Content Version: 11.7  © 4905-6673 [employer]. Care instructions adapted under license by [employer] (which disclaims liability or warranty for this information). If you have questions about a medical condition or this instruction, always ask your healthcare professional. [address] any warranty or liability for your use of this information.

## 2018-08-27 NOTE — MR AVS SNAPSHOT
[address] [place] [address] 
[phone] Patient: Truman Prakash MRN: [id number] GXR:[id number] Visit Information Date & Time Provider Department Dept. Phone Encounter #  
 8/27/2018  2:40 PM Donn Matos  [facility] [phone] [id number] Your Appointments 9/6/2018 11:20 AM  
ESTABLISHED PATIENT with Gume Carreon MD  
[facility] (MINAL SCHEDULING) Appt Note: 3wk fu per Dr Erika Rose req [address]  [address] [place] [address]  
[address] [id number] Upcoming Health Maintenance Date Due DTaP/Tdap/Td series (1 - Tdap) 10/5/1972 ZOSTER VACCINE AGE 60> 8/5/2011 Pneumococcal 65+ Low/Medium Risk (1 of 2 - PCV13) 10/5/2016 EYE EXAM RETINAL OR DILATED Q1 12/6/2017 MEDICARE YEARLY EXAM 3/14/2018 Influenza Age 5 to Adult 3/31/2019* GLAUCOMA SCREENING Q2Y 12/6/2018 HEMOGLOBIN A1C Q6M 12/22/2018 FOOT EXAM Q1 3/16/2019 MICROALBUMIN Q1 3/16/2019 LIPID PANEL Q1 6/22/2019 COLONOSCOPY 6/28/2023 *Topic was postponed. The date shown is not the original due date. Allergies as of 8/27/2018  Review Complete On: 8/27/2018 By: Vic Powell LPN No Known Allergies Current Immunizations  Reviewed on 7/10/2018 Name Date Influenza Nasal Vaccine 11/7/2017 Influenza Vaccine 10/20/2015 10:00 AM, 1/27/2015  9:30 AM, 10/22/2013 Influenza Vaccine (H5N1, A/[place]/1203/2004) 10/12/2015 12:00 AM  
 Influenza Vaccine (Quad) PF 11/12/2016 10:10 AM  
  
 Not reviewed this visit You Were Diagnosed With   
  
 Codes Comments Type 2 diabetes mellitus with hyperglycemia, without long-term current use of insulin (HCC)    -  Primary ICD-10-CM: E11.65 ICD-9-CM: 250.00, 790.29 S/P CABG x 3     ICD-10-CM: Z95.1 ICD-9-CM: V45.81   
 CAD, multiple vessel     ICD-10-CM: I25.10 ICD-9-CM: 414.00 Hypertension, essential     ICD-10-CM: I10 
ICD-9-CM: 401.9 Dyslipidemia, goal LDL below 100     ICD-10-CM: E78.5 ICD-9-CM: 272.4 Vitals BP Pulse Temp Resp Height(growth percentile) Weight(growth percentile) 136/80 (BP 1 Location: Right arm, BP Patient Position: Sitting) 69 98.6 °F (37 °C) (Oral) 16 5' 3\" (1.6 m) 135 lb (61.2 kg) SpO2 BMI Smoking Status 98% 23.91 kg/m2 Never Smoker Vitals History BMI and BSA Data Body Mass Index Body Surface Area  
 23.91 kg/m 2 1.65 m 2 Preferred Pharmacy Pharmacy Name Phone [facility] [phone] Your Updated Medication List  
  
   
This list is accurate as of 8/27/18  3:44 PM.  Always use your most recent med list.  
  
  
  
  
 ascorbic acid (vitamin C) 500 mg tablet Commonly known as:  VITAMIN C Take 1 Tab by mouth daily. aspirin 81 mg chewable tablet Take 1 Tab by mouth daily. atorvastatin 20 mg tablet Commonly known as:  LIPITOR Take 1 Tab by mouth nightly. azelastine 137 mcg (0.1 %) nasal spray Commonly known as:  ASTELIN  
1 Spray by Both Nostrils route two (2) times a day. Use in each nostril as directed  
  
 cyanocobalamin 500 mcg tablet Commonly known as:  VITAMIN B12 Take 1 Tab by mouth daily. ferrous sulfate 324 mg (65 mg iron) tablet Take 1 Tab by mouth Daily (before breakfast). Indications: Iron Deficiency Anemia  
  
 folic acid 1 mg tablet Commonly known as:  Google Take 1 Tab by mouth daily. glipiZIDE 10 mg tablet Commonly known as:  Jesus Harrison Take 0.5 Tabs by mouth two (2) times a day. Different dose than what you were taking  
  
 glucose blood VI test strips strip Commonly known as:  Orion Gar Check sugar once daily  
  
 hydroCHLOROthiazide 25 mg tablet Commonly known as:  CLAUDYICARLOS MANUEL  
 Take 2 Tabs by mouth daily (after breakfast). insulin glargine 100 unit/mL injection Commonly known as:  LANTUS  
25 Units by SubCUTAneous route daily. JANUVIA 50 mg tablet Generic drug:  SITagliptin TAKE ONE TABLET BY MOUTH ONCE DAILY  
  
 lancets 33 gauge Misc Commonly known as: One Touch Thom Cleaves Check sugar once daily  
  
 losartan 50 mg tablet Commonly known as:  COZAAR Take 2 Tabs by mouth daily. metoprolol tartrate 100 mg IR tablet Commonly known as:  LOPRESSOR Take 1 Tab by mouth every twelve (12) hours for 60 days. Different dose than what you were taking  
  
 pantoprazole 40 mg tablet Commonly known as:  PROTONIX Take 40 mg by mouth daily. traZODone 50 mg tablet Commonly known as:  Debbe Gunner Take 0.5 Tabs by mouth nightly. Prescriptions Sent to Pharmacy Refills  
 azelastine (ASTELIN) 137 mcg (0.1 %) nasal spray 0 [place] by Both Nostrils route two (2) times a day. Use in each nostril as directed Class: Normal  
 Pharmacy:  [address] Ph #: [phone] Route: Both Nostrils  
 traZODone (DESYREL) 50 mg tablet 2 Sig: Take 0.5 Tabs by mouth nightly. Class: Normal  
 Pharmacy:  [address] Ph #: [phone] Route: Oral  
  
We Performed the Following HEMOGLOBIN A1C WITH EAG [42492 CPT(R)]  DIABETES FOOT EXAM [7 Custom] LIPID PANEL [70526 CPT(R)] METABOLIC PANEL, COMPREHENSIVE [31721 CPT(R)] MICROALBUMIN, UR, RAND W/ MICROALB/CREAT RATIO [id number] CPT(R)] Patient Instructions Learning About Diabetes and Coronary Artery Disease How are diabetes and heart disease connected? Many people think diabetes and heart disease go hand in hand.  But having diabetes doesn't have to mean that you are going to have a heart attack someday. Healthy living can help prevent many of the problems that come with both diabetes and heart disease. For some people, diabetes can cause problems in your body that may lead to heart disease. Diabetes can make the problems of heart disease worse. Experts do not fully understand how diabetes affects the heart. Many things can lead to heart disease, including high blood sugar, insulin resistance, high cholesterol, and high blood pressure. But lifestyle and genetics may also affect a person's risk. But here's the good news: The good things you're doing to stay healthy with diabetes-eating healthy foods, quitting smoking, getting exercise and more-are also helping your heart. What increases your risk for heart disease? When you have diabetes, your risk for heart disease is even higher if you have: 
· High blood pressure, which pushes blood through the arteries with too much force. Over time, this damages the walls of the arteries. · High cholesterol, which causes the buildup of a kind of fat inside the blood vessel walls. This buildup can lower blood flow to the heart muscle and raise your risk for having a heart attack. · Kidney damage, which shares many of the risk factors for heart disease (such as high blood sugar, high blood pressure, and high cholesterol). How can you keep your heart healthy when you have diabetes? Managing your diabetes and keeping your heart healthy are two sides of the same coin. Here are some things you can do. · Test your blood sugar levels and get your diabetes tests on schedule. Try to keep your numbers within your target range. · Keep track of your blood pressure. The target for most people with diabetes is below 140/90. Your doctor will give you a goal that's right for you. If your blood pressure is high, your treatment may also include medicine. Changes in your lifestyle, such as staying at a healthy weight, may also help you lower your blood pressure. · Eat heart-healthy foods. These include fruits, vegetables, whole grains, fish, and low-fat or nonfat dairy foods. Limit sodium, alcohol, and sweets. · If your doctor recommends it, get more exercise. Walking is a good choice. Bit by bit, increase the amount you walk every day. Try for at least 30 minutes on most days of the week. · Do not smoke. Smoking can make diabetes and heart disease worse. If you need help quitting, talk to your doctor about stop-smoking programs and medicines. These can increase your chances of quitting for good. · Your doctor may talk with you about taking medicines for your heart. For example, your doctor may suggest taking a statin or daily aspirin. Where can you learn more? Go to http://pardeep-harjit.info/. Enter G288 in the search box to learn more about \"Learning About Diabetes and Coronary Artery Disease. \" Current as of: December 7, 2017 Content Version: 11.7 © 6485-3931 [employer]. Care instructions adapted under license by [employer] (which disclaims liability or warranty for this information). If you have questions about a medical condition or this instruction, always ask your healthcare professional. [place] [address] any warranty or liability for your use of this information. Introducing [facility]! Becky Pulliam introduces [employer] patient portal. Now you can access parts of your medical record, email your doctor's office, and request medication refills online. 1. In your internet browser, go to [email or web address] 2. Click on the First Time User? Click Here link in the Sign In box. You will see the New Member Sign Up page. 3. Enter your [employer] Access Code exactly as it appears below. You will not need to use this code after youve completed the sign-up process. If you do not sign up before the expiration date, you must request a new code. · ZoweeTV Access Code: [email or web address] Expires: 9/23/2018 10:49 AM 
 
4. Enter the last four digits of your Social Security Number (xxxx) and Date of Birth (mm/dd/yyyy) as indicated and click Submit. You will be taken to the next sign-up page. 5. Create a ZoweeTV ID. This will be your ZoweeTV login ID and cannot be changed, so think of one that is secure and easy to remember. 6. Create a ZoweeTV password. You can change your password at any time. 7. Enter your Password Reset Question and Answer. This can be used at a later time if you forget your password. 8. Enter your e-mail address. You will receive e-mail notification when new information is available in [address] 9. Click Sign Up. You can now view and download portions of your medical record. 10. Click the Download Summary menu link to download a portable copy of your medical information. If you have questions, please visit the Frequently Asked Questions section of the Zowee[employer] website. Remember, ZoweeTV is NOT to be used for urgent needs. For medical emergencies, dial 911. Now available from your iPhone and Android! Please provide this summary of care documentation to your next provider. Your primary care clinician is listed as Jordi Dent. If you have any questions after today's visit, please call [phone].

## 2018-08-27 NOTE — PROGRESS NOTES
Chief Complaint   Patient presents with    Diabetes    Cholesterol Problem    Hypertension    Sleep Problem    Nasal Discharge     runny nose      Patient is not taking several medications , has been marked \"not taking\" and pain medications. 1. Have you been to the ER, urgent care clinic since your last visit? Hospitalized since your last visit? Yes When: July 9, 2018    2. Have you seen or consulted any other health care providers outside of the [address] [place] [address] since your last visit? Include any pap smears or colon screening.  No

## 2018-08-27 NOTE — PROGRESS NOTES
HISTORY OF PRESENT ILLNESS  Barney Jane is a 78 y.o. male. He was seen for follow up on DM, dyslipidemia, HTN, CKD. Patient has been non compliant with follow ups and medicines  Was admitted with NSTEMI on 06/22/2018 and had cardiac cath which showed significant cardiac disease. He was taken off Plavix and was scheduled for CABG  Had CABG x 3 on 07/09/2018  HPI  Cardiovascular Review  The patient has hypertension, hyperlipidemia, coronary artery disease, status post coronary artery stenting and had 2 vessel PCI on 11/11/2016, s/p CABG x 3 on 07/09/2018. He reports taking medications as instructed, no medication side effects noted, notes stable dyspnea on exertion, no change, no swelling of ankles, no orthostatic dizziness or lightheadedness, no orthopnea or paroxysmal nocturnal dyspnea, no palpitations, no intermittent claudication symptoms. Diet and Lifestyle: generally follows a low fat low cholesterol diet, generally follows a low sodium diet, nonsmoker. Lab review: labs reviewed and discussed with patient. Follows Jinny  Medicines:ASA, Metoprolol 100 mg XL, Losartan 100 mg, Hctz 50 mg, Atorvastatin 20 mg     Lab Results   Component Value Date/Time    Cholesterol, total 171 06/22/2018 10:11 AM    HDL Cholesterol 40 06/22/2018 10:11 AM    LDL, calculated 101 (H) 06/22/2018 10:11 AM    VLDL, calculated 30 06/22/2018 10:11 AM    Triglyceride 150 (H) 06/22/2018 10:11 AM    CHOL/HDL Ratio 4.3 06/22/2018 10:11 AM       Endocrine Review  He is seen for diabetes. Since last visit he reports: no polyuria or polydipsia, no chest pain, dyspnea or TIA's, no numbness, tingling or pain in extremities, no unusual visual symptoms, weight has decreased, no significant changes. Home glucose monitoring: is performed sporadically, nonfasting values range 160-200  He is checking his sugars one per day. He reports medication compliance: compliant all of the time. Medication side effects: none.   Diabetic diet compliance: noncompliant some of the time. Lab review: labs reviewed and discussed with patient. Eye exam: UTD. On Insulin Lantus 25 units ( qhs) which he has stopped recently, Glipizide 5 mg BID and Januvia 50 mg  Lab Results   Component Value Date/Time    Hemoglobin A1c 7.4 (H) 06/22/2018 01:53 PM        Review of Systems   Constitutional: Negative for chills, fever and malaise/fatigue. HENT: Positive for congestion. Negative for ear pain, sore throat and tinnitus. Eyes: Negative for blurred vision, double vision, pain and discharge. Respiratory: Negative for cough, shortness of breath and wheezing. Cardiovascular: Negative for chest pain, palpitations and leg swelling. Gastrointestinal: Negative for abdominal pain, blood in stool, constipation, diarrhea, nausea and vomiting. Genitourinary: Negative for dysuria, frequency, hematuria and urgency. Musculoskeletal: Negative for back pain, joint pain and myalgias. Skin: Negative for rash. Neurological: Negative for dizziness, tremors, seizures and headaches. Endo/Heme/Allergies: Negative for polydipsia. Does not bruise/bleed easily. Psychiatric/Behavioral: Negative for depression and substance abuse. The patient has insomnia. The patient is not nervous/anxious. Physical Exam   Constitutional: He is oriented to person, place, and time. He appears well-developed and well-nourished. HENT:   Head: Normocephalic and atraumatic. Right Ear: External ear normal.   Mouth/Throat: Oropharynx is clear and moist. No oropharyngeal exudate. Eyes: Conjunctivae and EOM are normal. Pupils are equal, round, and reactive to light. No scleral icterus. Neck: Normal range of motion. Neck supple. No JVD present. No thyromegaly present. Cardiovascular: Normal rate, regular rhythm, normal heart sounds and intact distal pulses. No murmur heard.   Pulses:       Carotid pulses are 2+ on the right side with bruit  Systolic murmur on aortic area Pulmonary/Chest: Effort normal and breath sounds normal. He has no wheezes. Sternotomy scar well healed   Abdominal: Soft. Bowel sounds are normal. He exhibits no distension and no mass. Musculoskeletal: Normal range of motion. He exhibits no edema or tenderness. Feet:warm, good capillary refill, no trophic changes or ulcerative lesions, normal DP and PT pulses, normal monofilament exam and normal sensory exam     Lymphadenopathy:     He has no cervical adenopathy. Neurological: He is alert and oriented to person, place, and time. He has normal reflexes. No cranial nerve deficit. Skin: Skin is warm and dry. No rash noted. He is not diaphoretic. Psychiatric: He has a normal mood and affect. Nursing note and vitals reviewed. ASSESSMENT and PLAN  Diagnoses and all orders for this visit:    1. Type 2 diabetes mellitus with hyperglycemia, without long-term current use of insulin ([facility])  -      DIABETES FOOT EXAM  -     METABOLIC PANEL, COMPREHENSIVE  -     MICROALBUMIN, UR, RAND W/ MICROALB/CREAT RATIO  -     HEMOGLOBIN A1C WITH EAG    2. S/P CABG x 3  -     METABOLIC PANEL, COMPREHENSIVE    3. CAD, multiple vessel  -     METABOLIC PANEL, COMPREHENSIVE    4. Hypertension, essential  -     METABOLIC PANEL, COMPREHENSIVE    5. Dyslipidemia, goal LDL below 954  -     METABOLIC PANEL, COMPREHENSIVE  -     LIPID PANEL    6. Blood loss anemia  -     CBC WITH AUTOMATED DIFF    Other orders  -     azelastine (ASTELIN) 137 mcg (0.1 %) nasal spray; 1 [place] by Both Nostrils route two (2) times a day. Use in each nostril as directed  -     traZODone (DESYREL) 50 mg tablet; Take 0.5 Tabs by mouth nightly. Discussed lifestyle issues and health guidance given  Patient was given an after visit summary which includes diagnoses, vital signs, current medications, instructions and references & authorized prescriptions . Results of labs will be conveyed to patient, once available.   Pt verbalized instructions I provided and expressed understanding of discussion that was held today.   Follow-up Disposition: Not on File

## 2018-08-29 DIAGNOSIS — E11.65 TYPE 2 DIABETES MELLITUS WITH HYPERGLYCEMIA, WITHOUT LONG-TERM CURRENT USE OF INSULIN (HCC): ICD-10-CM

## 2018-08-30 RX ORDER — SITAGLIPTIN 50 MG/1
TABLET, FILM COATED ORAL
Qty: 90 TAB | Refills: 1 | Status: SHIPPED | OUTPATIENT
Start: 2018-08-30 | End: 2019-03-01 | Stop reason: SDUPTHER

## 2018-09-05 ENCOUNTER — TELEPHONE (OUTPATIENT)
Dept: FAMILY MEDICINE CLINIC | Age: 68
End: 2018-09-05

## 2018-09-05 DIAGNOSIS — E11.65 TYPE 2 DIABETES MELLITUS WITH HYPERGLYCEMIA, WITHOUT LONG-TERM CURRENT USE OF INSULIN (HCC): Primary | ICD-10-CM

## 2018-09-05 DIAGNOSIS — Z23 ENCOUNTER FOR IMMUNIZATION: ICD-10-CM

## 2018-09-05 NOTE — TELEPHONE ENCOUNTER
----- Message from Socrates Broussard sent at 9/5/2018  4:14 PM EDT -----  Regarding: Dr. Nenita Sellers  Pt requested a call back to discuss a poss written referral for an eye exam and pneumonia shot. Best contact is [phone].

## 2018-09-06 ENCOUNTER — OFFICE VISIT (OUTPATIENT)
Dept: CARDIOLOGY CLINIC | Age: 68
End: 2018-09-06

## 2018-09-06 VITALS
SYSTOLIC BLOOD PRESSURE: 110 MMHG | BODY MASS INDEX: 23.92 KG/M2 | HEIGHT: 63 IN | OXYGEN SATURATION: 99 % | RESPIRATION RATE: 16 BRPM | DIASTOLIC BLOOD PRESSURE: 70 MMHG | HEART RATE: 68 BPM | WEIGHT: 135 LBS

## 2018-09-06 DIAGNOSIS — E78.00 HYPERCHOLESTEREMIA: ICD-10-CM

## 2018-09-06 DIAGNOSIS — E11.65 TYPE 2 DIABETES MELLITUS WITH HYPERGLYCEMIA, WITHOUT LONG-TERM CURRENT USE OF INSULIN (HCC): ICD-10-CM

## 2018-09-06 DIAGNOSIS — I35.0 NONRHEUMATIC AORTIC VALVE STENOSIS: ICD-10-CM

## 2018-09-06 DIAGNOSIS — Z95.1 S/P CABG X 3: ICD-10-CM

## 2018-09-06 DIAGNOSIS — I21.4 NSTEMI (NON-ST ELEVATED MYOCARDIAL INFARCTION) (HCC): ICD-10-CM

## 2018-09-06 DIAGNOSIS — I10 HYPERTENSION, ESSENTIAL: ICD-10-CM

## 2018-09-06 DIAGNOSIS — Z78.9 STATIN INTOLERANCE: ICD-10-CM

## 2018-09-06 DIAGNOSIS — I25.10 CORONARY ARTERY DISEASE INVOLVING NATIVE CORONARY ARTERY OF NATIVE HEART WITHOUT ANGINA PECTORIS: Primary | ICD-10-CM

## 2018-09-06 LAB
ALBUMIN SERPL-MCNC: 4.2 G/DL (ref 3.6–4.8)
ALBUMIN/CREAT UR: 1272.7 MG/G CREAT (ref 0–30)
ALBUMIN/GLOB SERPL: 1.4 {RATIO} (ref 1.2–2.2)
ALP SERPL-CCNC: 123 IU/L (ref 39–117)
ALT SERPL-CCNC: 24 IU/L (ref 0–44)
AST SERPL-CCNC: 19 IU/L (ref 0–40)
BASOPHILS # BLD AUTO: 0.1 X10E3/UL (ref 0–0.2)
BASOPHILS NFR BLD AUTO: 1 %
BILIRUB SERPL-MCNC: 0.3 MG/DL (ref 0–1.2)
BUN SERPL-MCNC: 13 MG/DL (ref 8–27)
BUN/CREAT SERPL: 13 (ref 10–24)
CALCIUM SERPL-MCNC: 9.8 MG/DL (ref 8.6–10.2)
CHLORIDE SERPL-SCNC: 103 MMOL/L (ref 96–106)
CHOLEST SERPL-MCNC: 132 MG/DL (ref 100–199)
CO2 SERPL-SCNC: 20 MMOL/L (ref 20–29)
CREAT SERPL-MCNC: 1.04 MG/DL (ref 0.76–1.27)
CREAT UR-MCNC: 103.1 MG/DL
EOSINOPHIL # BLD AUTO: 0.3 X10E3/UL (ref 0–0.4)
EOSINOPHIL NFR BLD AUTO: 5 %
ERYTHROCYTE [DISTWIDTH] IN BLOOD BY AUTOMATED COUNT: 18.7 % (ref 12.3–15.4)
EST. AVERAGE GLUCOSE BLD GHB EST-MCNC: 154 MG/DL
GLOBULIN SER CALC-MCNC: 2.9 G/DL (ref 1.5–4.5)
GLUCOSE SERPL-MCNC: 159 MG/DL (ref 65–99)
HBA1C MFR BLD: 7 % (ref 4.8–5.6)
HCT VFR BLD AUTO: 42.5 % (ref 37.5–51)
HDLC SERPL-MCNC: 46 MG/DL
HGB BLD-MCNC: 13.4 G/DL (ref 13–17.7)
IMM GRANULOCYTES # BLD: 0 X10E3/UL (ref 0–0.1)
IMM GRANULOCYTES NFR BLD: 1 %
INTERPRETATION, 910389: NORMAL
LDLC SERPL CALC-MCNC: 54 MG/DL (ref 0–99)
LYMPHOCYTES # BLD AUTO: 1.7 X10E3/UL (ref 0.7–3.1)
LYMPHOCYTES NFR BLD AUTO: 27 %
MCH RBC QN AUTO: 27.6 PG (ref 26.6–33)
MCHC RBC AUTO-ENTMCNC: 31.5 G/DL (ref 31.5–35.7)
MCV RBC AUTO: 87 FL (ref 79–97)
MICROALBUMIN UR-MCNC: 1312.2 UG/ML
MONOCYTES # BLD AUTO: 0.4 X10E3/UL (ref 0.1–0.9)
MONOCYTES NFR BLD AUTO: 7 %
NEUTROPHILS # BLD AUTO: 3.6 X10E3/UL (ref 1.4–7)
NEUTROPHILS NFR BLD AUTO: 59 %
PLATELET # BLD AUTO: 179 X10E3/UL (ref 150–379)
POTASSIUM SERPL-SCNC: 6 MMOL/L (ref 3.5–5.2)
PROT SERPL-MCNC: 7.1 G/DL (ref 6–8.5)
RBC # BLD AUTO: 4.86 X10E6/UL (ref 4.14–5.8)
SODIUM SERPL-SCNC: 138 MMOL/L (ref 134–144)
TRIGL SERPL-MCNC: 162 MG/DL (ref 0–149)
VLDLC SERPL CALC-MCNC: 32 MG/DL (ref 5–40)
WBC # BLD AUTO: 6.1 X10E3/UL (ref 3.4–10.8)

## 2018-09-06 NOTE — TELEPHONE ENCOUNTER
Outbound call to patient. He was unavailable. Spoke with wife who is on PHI. Patients name and  verified. She requested that referral for eye exam be mailed to patients home and RX for immunization be faxed to pharmacy.

## 2018-09-06 NOTE — PROGRESS NOTES
Please inform patient,  Average sugar 7.0, ideal for him as he had heart history and heart surgery.  To continue on same medicines  Cholesterol results also at goal  Kidney, liver, blood count normal except again high Potassium ( ?? Lab error as He is on high dose of Hctz so potassium level should not be this high)  Urine protein getting worst, will need to keep his sugar and pressure under better control  thanks

## 2018-09-06 NOTE — PATIENT INSTRUCTIONS
A referral to cardiac rehabilitation has been sent. Please let us know if you have not been contacted in 7-10 days. Follow up with Dr. Ron Alex in January/2019.

## 2018-09-06 NOTE — MR AVS SNAPSHOT
[address] 
[phone] Patient: Naomi Moreno MRN: [id number] APR:[birth year]77 Visit Information Date & Time Provider Department Dept. Phone Encounter #  
 9/6/2018 11:20 AM Liza Walton MD [facility] Patti Butcher [phone] [id number] Your Appointments 10/23/2018  4:00 PM  
Medicare Physical with Sofya Devine MD  
[facility]) Appt Note: medicare wellness. per dr. Damir Hernandez [phone]  
[phone]  
  
   
 [address] [phone] Upcoming Health Maintenance Date Due DTaP/Tdap/Td series (1 - Tdap) 10/5/1972 ZOSTER VACCINE AGE 60> 8/5/2011 Pneumococcal 65+ Low/Medium Risk (1 of 2 - PCV13) 10/5/2016 EYE EXAM RETINAL OR DILATED Q1 12/6/2017 MEDICARE YEARLY EXAM 3/14/2018 Influenza Age 5 to Adult 3/31/2019* GLAUCOMA SCREENING Q2Y 12/6/2018 HEMOGLOBIN A1C Q6M 12/22/2018 MICROALBUMIN Q1 3/16/2019 LIPID PANEL Q1 6/22/2019 FOOT EXAM Q1 8/27/2019 COLONOSCOPY 6/28/2023 *Topic was postponed. The date shown is not the original due date. Allergies as of 9/6/2018  Review Complete On: 9/6/2018 By: Dmitry Hill No Known Allergies Current Immunizations  Reviewed on 7/10/2018 Name Date Influenza Nasal Vaccine 11/7/2017 Influenza Vaccine 10/20/2015 10:00 AM, 1/27/2015  9:30 AM, 10/22/2013 Influenza Vaccine (H5N1, A/[place]/1203/2004) 10/12/2015 12:00 AM  
 Influenza Vaccine (Quad) PF 11/12/2016 10:10 AM  
  
 Not reviewed this visit You Were Diagnosed With   
  
 Codes Comments Coronary artery disease involving native heart without angina pectoris, unspecified vessel or lesion type    -  Primary ICD-10-CM: I25.10 ICD-9-CM: 414.01 Vitals BP Pulse Resp Height(growth percentile) Weight(growth percentile) SpO2 110/70 (BP 1 Location: Left arm, BP Patient Position: Sitting) 68 16 5' 3\" (1.6 m) 135 lb (61.2 kg) 99% BMI Smoking Status 23.91 kg/m2 Never Smoker BMI and BSA Data Body Mass Index Body Surface Area  
 23.91 kg/m 2 1.65 m 2 Preferred Pharmacy Pharmacy Name Phone [facility] [phone] Your Updated Medication List  
  
   
This list is accurate as of 9/6/18 12:01 PM.  Always use your most recent med list.  
  
  
  
  
 ascorbic acid (vitamin C) 500 mg tablet Commonly known as:  VITAMIN C Take 1 Tab by mouth daily. aspirin 81 mg chewable tablet Take 1 Tab by mouth daily. atorvastatin 20 mg tablet Commonly known as:  LIPITOR Take 1 Tab by mouth nightly. azelastine 137 mcg (0.1 %) nasal spray Commonly known as:  ASTELIN  
1 Spray by Both Nostrils route two (2) times a day. Use in each nostril as directed  
  
 cyanocobalamin 500 mcg tablet Commonly known as:  VITAMIN B12 Take 1 Tab by mouth daily. ferrous sulfate 324 mg (65 mg iron) tablet Take 1 Tab by mouth Daily (before breakfast). Indications: Iron Deficiency Anemia  
  
 folic acid 1 mg tablet Commonly known as:  Google Take 1 Tab by mouth daily. glipiZIDE 10 mg tablet Commonly known as:  Kathryn Ashwini Take 0.5 Tabs by mouth two (2) times a day. Different dose than what you were taking  
  
 hydroCHLOROthiazide 25 mg tablet Commonly known as:  HYDRODIURIL Take 2 Tabs by mouth daily (after breakfast). JANUVIA 50 mg tablet Generic drug:  SITagliptin TAKE ONE TABLET BY MOUTH ONCE DAILY losartan 50 mg tablet Commonly known as:  COZAAR Take 2 Tabs by mouth daily. metoprolol tartrate 100 mg IR tablet Commonly known as:  LOPRESSOR Take 1 Tab by mouth every twelve (12) hours for 60 days. Different dose than what you were taking  
  
 pantoprazole 40 mg tablet Commonly known as:  PROTONIX Take 40 mg by mouth daily. traZODone 50 mg tablet Commonly known as:  Madolyn Saas Take 0.5 Tabs by mouth nightly. We Performed the Following REFERRAL TO CARDIAC REHAB [[id number] Custom] Comments:  
 Status/post Coronary Artery Bypass Referral Information Referral ID Referred By Referred To  
  
 [id number] mara Perez Not Available Visits Status Start Date End Date 1 New Request 9/6/18 9/6/19 If your referral has a status of pending review or denied, additional information will be sent to support the outcome of this decision. Patient Instructions A referral to cardiac rehabilitation has been sent. Please let us know if you have not been contacted in 7-10 days. Follow up with Dr. Sondra Clark in January/2019. Introducing [facility]! [employer] introduces [employer] patient portal. Now you can access parts of your medical record, email your doctor's office, and request medication refills online. 1. In your internet browser, go to [email or web address] 2. Click on the First Time User? Click Here link in the Sign In box. You will see the New Member Sign Up page. 3. Enter your [employer] Access Code exactly as it appears below. You will not need to use this code after youve completed the sign-up process. If you do not sign up before the expiration date, you must request a new code. · [employer] Access Code: [email or web address] Expires: 9/23/2018 10:49 AM 
 
4. Enter the last four digits of your Social Security Number (xxxx) and Date of Birth (mm/dd/yyyy) as indicated and click Submit. You will be taken to the next sign-up page. 5. Create a [employer] ID. This will be your [employer] login ID and cannot be changed, so think of one that is secure and easy to remember. 6. Create a [employer] password. You can change your password at any time. 7. Enter your Password Reset Question and Answer. This can be used at a later time if you forget your password. 8. Enter your e-mail address. You will receive e-mail notification when new information is available in [address] 9. Click Sign Up. You can now view and download portions of your medical record. 10. Click the Download Summary menu link to download a portable copy of your medical information. If you have questions, please visit the Frequently Asked Questions section of the [employer] website. Remember, [employer] is NOT to be used for urgent needs. For medical emergencies, dial 911. Now available from your iPhone and Android! Please provide this summary of care documentation to your next provider. Your primary care clinician is listed as Kris Bae. If you have any questions after today's visit, please call [phone].

## 2018-09-06 NOTE — PROGRESS NOTES
Neal Kendall     1950       Monik Chatterjee MD, [facility]  Date of Visit-9/6/2018   PCP is Davey Man MD   [address] [place] [address] [facility] [place]  [facility]  HPI:  Gallo Hernandez is a 78 y.o. male   Follow up CAD with recent CABG. Has HTN and Diabetes. Post-Op had anemia and gastritis. Overall the pt states he is doing well, where he has been feeling better after surgery. He has been trying to walk more since the surgery. Pt is doing well with his current medical regimen. Denies chest pain, edema, syncope or shortness of breath at rest, has no tachycardia, palpitations or sense of arrhythmia. CABG x 3  7-9-18= LIMA to LAD, SVG-OM, SVG-dRCA   Echo 6-22-18 EF 55-60%, mod MAC, aortic sclerosis without stenosis , mild TR  LISA 6-18-18 4 minutes +moderate hypokinesis of the mid anteroseptal, entire inferior, apical septal, and apical wall(s). With exercise a mild reduction in LV function. PCI 11-11-16 JEREMY to mLAD 95%  JEREMY to OM1 90%      Assessment/Plan:     1. CAD   ----NSTEMI November 2016 with JEREMY to LAD and OM2.    ----Had abnormal stress test 6/16/18 with global hypokinesia and subsequent cath with 3 vessel CAD. Had successful CABG.    ----Plan is to continue statin and aspirin. 2. Lipids on high potency statin as appropriate for secondary prevention. Lab Results   Component Value Date/Time    LDL, calculated 54 09/05/2018 08:19 AM      3. HTN. Well controlled on beta-blocker and HCTZ. BP Readings from Last 3 Encounters:   09/06/18 110/70   08/27/18 136/80   08/15/18 174/62       4. Pulmonary HTN resolved. 5. DM2. Main Cardiac risk factor. Lab Results   Component Value Date/Time    Hemoglobin A1c 7.0 (H) 09/05/2018 08:19 AM      6. Mild AS prev, last echo with aortic sclerosis without stenosis     Follow up in 4 months and start cardiac rehab.     Future Appointments  Date Time Provider Mina Delgado   10/23/2018 4:00 PM Davey Man, MD [place] [facility] SCHED   1/7/2019 9:20 AM Maksim Wright  [address]      Patient Instructions   A referral to cardiac rehabilitation has been sent. Please let us know if you have not been contacted in 7-10 days. Follow up with Dr. Surekha Cedillo in January/2019. Key CAD CHF Meds             hydroCHLOROthiazide (HYDRODIURIL) 25 mg tablet  (Taking) Take 2 Tabs by mouth daily (after breakfast). metoprolol tartrate (LOPRESSOR) 100 mg IR tablet  (Taking) Take 1 Tab by mouth every twelve (12) hours for 60 days. Different dose than what you were taking    losartan (COZAAR) 50 mg tablet  (Taking) Take 2 Tabs by mouth daily. aspirin 81 mg chewable tablet  (Taking) Take 1 Tab by mouth daily. atorvastatin (LIPITOR) 20 mg tablet  (Taking) Take 1 Tab by mouth nightly. Impression:   1. Coronary artery disease involving native coronary artery of native heart without angina pectoris    2. Hypercholesteremia    3. Hypertension, essential    4. Nonrheumatic aortic valve stenosis    5. NSTEMI (non-ST elevated myocardial infarction) (HCC)    6. Statin intolerance    7. Type 2 diabetes mellitus with hyperglycemia, without long-term current use of insulin (HCC)    8. S/P CABG x 3           ROS-except as noted above. . A complete cardiac and respiratory are reviewed and negative except as above ; Resp-denies wheezing  or productive cough,. Const- No unusual weight loss or fever; Neuro-no recent seizure or CVA ; GI- No BRBPR, abdom pain, bloating ; - no  hematuria   see supplement sheet, initialed and to be scanned by staff  Past Medical History:   Diagnosis Date    CAD (coronary artery disease) 11/10/2016    NSTEMI & 2 stents    Deafness 10/28/2012    [facility]di[facility]     Elevated cholesterol     Hypertension     NSTEMI (non-ST elevated myocardial infarction) ([facility] [address] 11/10/2016      Social Hx= reports that he has never smoked.  He has never used smokeless tobacco. He reports that he drinks about 1.2 oz of alcohol per week  He reports that he does not use illicit drugs. Family History   Problem Relation Age of Onset    Heart Disease Father     Heart Attack Father     Hypertension Mother    [facility] Elevated Lipids Brother     Elevated Lipids Brother     No Known Problems Sister     Elevated Lipids Brother     No Known Problems Son     No Known Problems Daughter     Anesth Problems Neg Hx      Exam and Labs:  /70 (BP 1 Location: Left arm, BP Patient Position: Sitting)  Pulse 68  Resp 16  Ht 5' 3\" (1.6 m)  Wt 135 lb (61.2 kg)  SpO2 99%  BMI 23.91 kg/m2Cbjboxxktwoych:  NAD, comfortable  Head: NC,AT. Eyes: No scleral icterus. Neck:  Neck supple. No JVD present. Throat: moist mucous membranes. Chest: Midline incision well healed. Neurological: alert, conversant and oriented . Skin: Skin is not cold. No obvious systemic rash noted. Not diaphoretic. No erythema. Psychiatric:  Grossly normal mood and affect. Behavior appears normal. Extremities:  no clubbing or cyanosis. Abdomen: non distended    Lungs:breath sounds normal. No stridor. distress, wheezes or  Rales. CCAAL:5/2 murmur systolic at the base normal rate, regular rhythm, normal S1, S2, PMI non displaced. Edema: Edema is none.   Lab Results   Component Value Date/Time    Cholesterol, total 132 09/05/2018 08:19 AM    HDL Cholesterol 46 09/05/2018 08:19 AM    LDL, calculated 54 09/05/2018 08:19 AM    Triglyceride 162 (H) 09/05/2018 08:19 AM    CHOL/HDL Ratio 4.3 06/22/2018 10:11 AM     Lab Results   Component Value Date/Time    Sodium 138 09/05/2018 08:19 AM    Potassium 6.0 (H) 09/05/2018 08:19 AM    Chloride 103 09/05/2018 08:19 AM    CO2 20 09/05/2018 08:19 AM    Anion gap 7 07/18/2018 04:22 AM    Glucose 159 (H) 09/05/2018 08:19 AM    BUN 13 09/05/2018 08:19 AM    Creatinine 1.04 09/05/2018 08:19 AM    BUN/Creatinine ratio 13 09/05/2018 08:19 AM    GFR est AA 86 09/05/2018 08:19 AM    GFR est non-AA 74 09/05/2018 08:19 AM Calcium 9.8 09/05/2018 08:19 AM      Wt Readings from Last 3 Encounters:   09/06/18 135 lb (61.2 kg)   08/27/18 135 lb (61.2 kg)   08/15/18 133 lb 8 oz (60.6 kg)      BP Readings from Last 3 Encounters:   09/06/18 110/70   08/27/18 136/80   08/15/18 174/62      Current Outpatient Prescriptions   Medication Sig    JANUVIA 50 mg tablet TAKE ONE TABLET BY MOUTH ONCE DAILY    azelastine (ASTELIN) 137 mcg (0.1 %) nasal spray 1 [place] by Both Nostrils route two (2) times a day. Use in each nostril as directed    traZODone (DESYREL) 50 mg tablet Take 0.5 Tabs by mouth nightly.  hydroCHLOROthiazide (HYDRODIURIL) 25 mg tablet Take 2 Tabs by mouth daily (after breakfast).  glipiZIDE (GLUCOTROL) 10 mg tablet Take 0.5 Tabs by mouth two (2) times a day. Different dose than what you were taking    metoprolol tartrate (LOPRESSOR) 100 mg IR tablet Take 1 Tab by mouth every twelve (12) hours for 60 days. Different dose than what you were taking    losartan (COZAAR) 50 mg tablet Take 2 Tabs by mouth daily.  aspirin 81 mg chewable tablet Take 1 Tab by mouth daily.  cyanocobalamin (VITAMIN B12) 500 mcg tablet Take 1 Tab by mouth daily.  folic acid (FOLVITE) 1 mg tablet Take 1 Tab by mouth daily.  ascorbic acid, vitamin C, (VITAMIN C) 500 mg tablet Take 1 Tab by mouth daily.  ferrous sulfate 324 mg (65 mg iron) tablet Take 1 Tab by mouth Daily (before breakfast). Indications: Iron Deficiency Anemia    pantoprazole (PROTONIX) 40 mg tablet Take 40 mg by mouth daily.  atorvastatin (LIPITOR) 20 mg tablet Take 1 Tab by mouth nightly.  insulin glargine (LANTUS) 100 unit/mL injection 25 Units by SubCUTAneous route daily.  lancets (ONE TOUCH DELICA) 33 gauge misc Check sugar once daily    glucose blood VI test strips (ONETOUCH VERIO) strip Check sugar once daily     No current facility-administered medications for this visit. Impression see above.       Written by Pratik Walker, as dictated by Eric Smalls MD.

## 2018-09-07 NOTE — PROGRESS NOTES
Outbound call to patient. No answer. Call went to voicemail however mailbox was full. Unable to leave message.

## 2018-09-11 NOTE — PROGRESS NOTES
Outbound call to patient. No answer. Left message for patient. Second attempt. Letter printed with results.

## 2018-09-15 PROBLEM — E11.21 TYPE 2 DIABETES WITH NEPHROPATHY (HCC): Status: ACTIVE | Noted: 2018-09-15

## 2018-09-15 PROBLEM — R06.09 DYSPNEA ON EXERTION: Status: RESOLVED | Noted: 2017-06-23 | Resolved: 2018-09-15

## 2018-09-24 ENCOUNTER — TELEPHONE (OUTPATIENT)
Dept: CARDIOLOGY CLINIC | Age: 68
End: 2018-09-24

## 2018-09-24 ENCOUNTER — TELEPHONE (OUTPATIENT)
Dept: FAMILY MEDICINE CLINIC | Age: 68
End: 2018-09-24

## 2018-09-24 RX ORDER — ASCORBIC ACID 500 MG
500 TABLET ORAL DAILY
Qty: 30 TAB | Refills: 1 | OUTPATIENT
Start: 2018-09-24

## 2018-09-24 RX ORDER — LANOLIN ALCOHOL/MO/W.PET/CERES
500 CREAM (GRAM) TOPICAL DAILY
Qty: 30 TAB | Refills: 1 | OUTPATIENT
Start: 2018-09-24

## 2018-09-24 RX ORDER — LOSARTAN POTASSIUM 50 MG/1
100 TABLET ORAL DAILY
Qty: 180 TAB | Refills: 1 | Status: SHIPPED | OUTPATIENT
Start: 2018-09-24 | End: 2019-03-01 | Stop reason: SDUPTHER

## 2018-09-24 RX ORDER — FOLIC ACID 1 MG/1
1 TABLET ORAL DAILY
Qty: 30 TAB | Refills: 1 | OUTPATIENT
Start: 2018-09-24

## 2018-09-24 NOTE — TELEPHONE ENCOUNTER
Outbound call to [facility]. Patients name and  verified. Inquiring about metoprolol. Dr. Paris Sigala d/c in August. Per records increase of dosage was ordered in July for 100 mg bid for 60 days from hospital. [facility] stated that she would send Dr. Vaishali White a message regarding medication.

## 2018-09-24 NOTE — TELEPHONE ENCOUNTER
Pt wife called in regards to requesting a refill for the selected medication. Metoprolol 100 mg  Iron tablets as well.    Pharmacy Confirmed   Phone# [phone]

## 2018-09-24 NOTE — TELEPHONE ENCOUNTER
Dinorah Lugo is calling on behalf of patient from Dr Mag Montero regards to medication metoprolol. She states that patient is calling to have medication refilled and she is requesting a call back with more information being that it looks as if Dr. Moira Ag d/c medication.      Best call back [phone]

## 2018-09-24 NOTE — TELEPHONE ENCOUNTER
Patient's wife calling to refill patient's metoprolol. Medication possibly omitted from medication profile due to stop date of 9-6-18 noted on previous refill from Adrian Bang NP. Had been taking 100 mg q 12 hours. Will verify with MD.  Discussed with Victoria Bennett at Dr. Elver Alcocer office.

## 2018-09-24 NOTE — TELEPHONE ENCOUNTER
Requested Prescriptions     Signed Prescriptions Disp Refills    losartan (COZAAR) 50 mg tablet 180 Tab 1     Sig: Take 2 Tabs by mouth daily. Authorizing Provider: Vlad Bates     Ordering User: Praveen Andrews     Refused Prescriptions Disp Refills    ascorbic acid, vitamin C, (VITAMIN C) 500 mg tablet 30 Tab 1     Sig: Take 1 Tab by mouth daily. Refused By: Praveen Andrews     Reason for Refusal: Appt required, please call patient    cyanocobalamin (VITAMIN B12) 500 mcg tablet 30 Tab 1     Sig: Take 1 Tab by mouth daily. Refused By: Praveen Andrews     Reason for Refusal: Appt required, please call patient    folic acid (FOLVITE) 1 mg tablet 30 Tab 1     Sig: Take 1 Tab by mouth daily. Refused By: Praveen Andrews     Reason for Refusal: Appt required, please call patient     Verbal order per SCAR Olmedo. Follow up with Dr. Eugenia Moran  on 1-7-19.

## 2018-09-25 RX ORDER — METOPROLOL TARTRATE 100 MG/1
100 TABLET ORAL 2 TIMES DAILY
Qty: 180 TAB | Refills: 1 | Status: SHIPPED | OUTPATIENT
Start: 2018-09-25 | End: 2019-03-01 | Stop reason: SDUPTHER

## 2018-09-26 NOTE — TELEPHONE ENCOUNTER
Verified patient with two types of identifiers. Spoke with patient's wife, verified on HIPAA form, and notified her that per Dr. Luciana Mejia patient to continue Metoprolol 100 mg BID. MD sent prescription to pharmacy yesterday. Patient's wife verbalized understanding and will call with any other questions.

## 2018-10-23 ENCOUNTER — OFFICE VISIT (OUTPATIENT)
Dept: FAMILY MEDICINE CLINIC | Age: 68
End: 2018-10-23

## 2018-10-23 VITALS
HEIGHT: 63 IN | DIASTOLIC BLOOD PRESSURE: 80 MMHG | BODY MASS INDEX: 23.74 KG/M2 | TEMPERATURE: 97.7 F | RESPIRATION RATE: 16 BRPM | HEART RATE: 81 BPM | SYSTOLIC BLOOD PRESSURE: 124 MMHG | WEIGHT: 134 LBS | OXYGEN SATURATION: 97 %

## 2018-10-23 DIAGNOSIS — Z12.5 SPECIAL SCREENING FOR MALIGNANT NEOPLASM OF PROSTATE: ICD-10-CM

## 2018-10-23 DIAGNOSIS — Z71.89 ADVANCED DIRECTIVES, COUNSELING/DISCUSSION: ICD-10-CM

## 2018-10-23 DIAGNOSIS — Z23 ENCOUNTER FOR IMMUNIZATION: ICD-10-CM

## 2018-10-23 DIAGNOSIS — Z00.00 INITIAL MEDICARE ANNUAL WELLNESS VISIT: ICD-10-CM

## 2018-10-23 RX ORDER — DICLOFENAC SODIUM 75 MG/1
TABLET, DELAYED RELEASE ORAL
COMMUNITY
End: 2018-10-23 | Stop reason: ALTCHOICE

## 2018-10-23 RX ORDER — INSULIN GLARGINE 100 [IU]/ML
INJECTION, SOLUTION SUBCUTANEOUS
COMMUNITY
Start: 2018-07-18 | End: 2018-10-23 | Stop reason: ALTCHOICE

## 2018-10-23 RX ORDER — PNEUMOCOCCAL 13-VALENT CONJUGATE VACCINE 2.2; 2.2; 2.2; 2.2; 2.2; 4.4; 2.2; 2.2; 2.2; 2.2; 2.2; 2.2; 2.2 UG/.5ML; UG/.5ML; UG/.5ML; UG/.5ML; UG/.5ML; UG/.5ML; UG/.5ML; UG/.5ML; UG/.5ML; UG/.5ML; UG/.5ML; UG/.5ML; UG/.5ML
INJECTION, SUSPENSION INTRAMUSCULAR
COMMUNITY
Start: 2018-09-06 | End: 2019-06-20 | Stop reason: ALTCHOICE

## 2018-10-23 RX ORDER — UREA 10 %
LOTION (ML) TOPICAL
COMMUNITY
End: 2018-10-23 | Stop reason: ALTCHOICE

## 2018-10-23 RX ORDER — OXYCODONE AND ACETAMINOPHEN 5; 325 MG/1; MG/1
TABLET ORAL
COMMUNITY
Start: 2018-07-20 | End: 2018-10-23 | Stop reason: ALTCHOICE

## 2018-10-23 RX ORDER — HYDROCODONE BITARTRATE AND ACETAMINOPHEN 5; 325 MG/1; MG/1
TABLET ORAL
COMMUNITY
End: 2018-10-23 | Stop reason: ALTCHOICE

## 2018-10-23 RX ORDER — SIMVASTATIN 40 MG/1
TABLET, FILM COATED ORAL
COMMUNITY
End: 2018-10-23 | Stop reason: ALTCHOICE

## 2018-10-23 RX ORDER — GABAPENTIN 300 MG/1
CAPSULE ORAL
COMMUNITY
End: 2018-10-23 | Stop reason: ALTCHOICE

## 2018-10-23 RX ORDER — CYCLOBENZAPRINE HCL 5 MG
TABLET ORAL
COMMUNITY
End: 2018-10-23 | Stop reason: ALTCHOICE

## 2018-10-23 RX ORDER — RANITIDINE 150 MG/1
CAPSULE ORAL
COMMUNITY
End: 2018-10-23 | Stop reason: ALTCHOICE

## 2018-10-23 NOTE — ACP (ADVANCE CARE PLANNING)
Advance Care Planning    Advance Care Planning (ACP) Provider Conversation Snapshot    Date of ACP Conversation: 10/23/18  Persons included in Conversation:  patient  Length of ACP Conversation in minutes:  16 minutes    Authorized Decision Maker (if patient is incapable of making informed decisions): wifeLinda  This person is: Other Legally Authorized Decision Maker (e.g. Next of Kin)          For Patients with Decision Making Capacity:   Values/Goals: Exploration of values, goals, and preferences if recovery is not expected, even with continued medical treatment in the event of:  Imminent death  Severe, permanent brain injury  \"In these circumstances, what matters most to you? \"  Care focused more on comfort or quality of life.     Conversation Outcomes / Follow-Up Plan:   Recommended completion of Advance Directive form after review of ACP materials and conversation with prospective healthcare agent   Recommended communicating the plan and making copies for the healthcare agent, personal physician, and others as appropriate (e.g., health system)  Recommended review of completed ACP document annually or upon change in health status  directive was given to patient and explained

## 2018-10-23 NOTE — PROGRESS NOTES
This is an Initial Medicare Annual Wellness Exam (AWV) (Performed 12 months after IPPE or effective date of Medicare Part B enrollment, Once in a lifetime)    I have reviewed the patient's medical history in detail and updated the computerized patient record. History     Past Medical History:   Diagnosis Date    CAD (coronary artery disease) 11/10/2016    NSTEMI & 2 stents    Deafness 10/28/2012    DM [facility]diabetes mellitus) ([facility] [address]     Elevated cholesterol     Hypertension     NSTEMI (non-ST elevated myocardial infarction) ([facility] [address] 11/10/2016      Past Surgical History:   Procedure Laterality Date    CARDIAC SURG PROCEDURE UNLIST  11/11/2016    2 stents    HX APPENDECTOMY       Current Outpatient Medications   Medication Sig Dispense Refill    diph,pertuss,acel,,tetanus vac,PF, (ADACEL) 2 Lf-(2.5-5-3-5 mcg)-5Lf/0.5 mL syrg vaccine 0.5 mL by IntraMUSCular route once for 1 dose. 0.5 mL 0    varicella-zoster recombinant, PF, (SHINGRIX, PF,) 50 mcg/0.5 mL susr injection 0.5mL by IntraMUSCular route once now and then repeat in 2-6 months 0.5 mL 1    metoprolol tartrate (LOPRESSOR) 100 mg IR tablet Take 1 Tab by mouth two (2) times a day. 180 Tab 1    losartan (COZAAR) 50 mg tablet Take 2 Tabs by mouth daily. 180 Tab 1    metformin HCl (METFORMIN PO) Take  by mouth two (2) times a day.  JANUVIA 50 mg tablet TAKE ONE TABLET BY MOUTH ONCE DAILY 90 Tab 1    hydroCHLOROthiazide (HYDRODIURIL) 25 mg tablet Take 2 Tabs by mouth daily (after breakfast). 60 Tab 1    glipiZIDE (GLUCOTROL) 10 mg tablet Take 0.5 Tabs by mouth two (2) times a day. Different dose than what you were taking 180 Tab 2    aspirin 81 mg chewable tablet Take 1 Tab by mouth daily. 365 Tab 0    cyanocobalamin (VITAMIN B12) 500 mcg tablet Take 1 Tab by mouth daily. 30 Tab 1    atorvastatin (LIPITOR) 20 mg tablet Take 1 Tab by mouth nightly.  30 Tab 3    PREVNAR 13, PF, 0.5 mL syrg injection       traZODone (DESYREL) 50 mg tablet Take 0.5 Tabs by mouth nightly. 30 Tab 2     No Known Allergies  Family History   Problem Relation Age of Onset    Heart Disease Father     Heart Attack Father     Hypertension Mother    [facility] Elevated Lipids Brother     Elevated Lipids Brother     No Known Problems Sister     Elevated Lipids Brother     No Known Problems Son     No Known Problems Daughter     Anesth Problems Neg Hx      Social History     Tobacco Use    Smoking status: Never Smoker    Smokeless tobacco: Never Used   Substance Use Topics    Alcohol use: Yes     Alcohol/week: 1.2 oz     Types: 1 Cans of beer, 1 Shots of liquor per week     Comment: 1 DRINK DAILY     Patient Active Problem List   Diagnosis Code    Cramp of limb R25.2    Deafness H91.90    Type 2 diabetes mellitus with hyperglycemia ([facility]) E11.65    Nonrheumatic aortic valve stenosis I35.0    Bruit of right carotid artery R09.89    Preop general physical exam Z01.818    Colon cancer screening Z12.11    Coronary artery disease involving native coronary artery of native heart without angina pectoris I25.10    Hypercholesteremia E78.00    Hypertension, essential I10    Statin intolerance Z78.9    NSTEMI (non-ST elevated myocardial infarction) ([facility]) I21.4    S/P CABG x 3 Z95.1    Type 2 diabetes with nephropathy ([facility]) E11.21       Depression Risk Factor Screening:     PHQ over the last two weeks 10/23/2018   Little interest or pleasure in doing things Not at all   Feeling down, depressed, irritable, or hopeless Not at all   Total Score PHQ 2 0     Alcohol Risk Factor Screening: You do not drink alcohol or very rarely. Functional Ability and Level of Safety:     Hearing Loss  The patient wears hearing aids. Activities of Daily Living  The home contains: no safety equipment. Patient does total self care    Fall Risk  Fall Risk Assessment, last 12 mths 10/23/2018   Able to walk? Yes   Fall in past 12 months?  No       Abuse Screen  Patient is not abused    Cognitive Screening   Evaluation of Cognitive Function:  Has your family/caregiver stated any concerns about your memory: no  Normal, MMSE  26/30. Missed 1 / 3 recall and spell back WORLD   Patient Care Team   Patient Care Team:  Rupert Barrientos MD as PCP - General (Family Practice)  Colleen Abel MD (Cardiology)  Janie Ulrich MD (Gastroenterology)  Maida Alonzo, RN as Ambulatory Care Navigator (Family Practice)  Frank Rhodes MD (Cardiothoracic Surgery)  Angella Castañeda RN as Ambulatory Care Navigator (Cardiology)  Rani Krueger RN as Ambulatory Care Navigator    Assessment/Plan   Education and counseling provided:  Are appropriate based on today's review and evaluation    Diagnoses and all orders for this visit:    1. Initial Medicare annual wellness visit    2. Advanced directives, counseling/discussion  -     ADVANCE CARE PLANNING FIRST 30 MINS    3. Special screening for malignant neoplasm of prostate  -     PSA SCREENING (SCREENING)    4. Encounter for immunization  -     ADMIN INFLUENZA VIRUS VAC  -     INFLUENZA VACCINE INACTIVATED (IIV), SUBUNIT, ADJUVANTED, IM  -     diph,pertuss,acel,,tetanus vac,PF, (ADACEL) 2 Lf-(2.5-5-3-5 mcg)-5Lf/0.5 mL syrg vaccine; 0.5 mL by IntraMUSCular route once for 1 dose.  -     varicella-zoster recombinant, PF, (SHINGRIX, PF,) 50 mcg/0.5 mL susr injection; 0.5mL by IntraMUSCular route once now and then repeat in 2-6 months         Health Maintenance Due   Topic Date Due    DTaP/Tdap/Td series (1 - Tdap) 10/05/1972    Shingrix Vaccine Age 50> (1 of 2) 10/05/2001    EYE EXAM RETINAL OR DILATED Q1  12/06/2017    MEDICARE YEARLY EXAM  03/14/2018    GLAUCOMA SCREENING Q2Y  12/06/2018     Discussed lifestyle issues and health guidance given  Patient was given an after visit summary which includes diagnoses, vital signs, current medications, instructions and references & authorized prescriptions . Results of labs will be conveyed to patient, once available.   Pt verbalized instructions I provided and expressed understanding of discussion that was held today.   Follow-up Disposition: Not on File

## 2018-10-23 NOTE — PROGRESS NOTES
Chief Complaint   Patient presents with    Annual Wellness Visit    Immunization/Injection     Influenza High dose     1. Have you been to the ER, urgent care clinic since your last visit? Hospitalized since your last visit? No    2. Have you seen or consulted any other health care providers outside of the [address] [place] [address] since your last visit? Include any pap smears or colon screening. No    Jenn Asp  is a 80 y.o.  male  who present for Influenza High dose immunizations/injections. He/she denies any symptoms , reactions or allergies that would exclude them from being immunized today. Risks and adverse reactions were discussed and the VIS was given if applicable to them. All questions were addressed. He/She was observed for 10 min post injection. There were no reactions observed.     Jim Hui LPN

## 2018-10-23 NOTE — PATIENT INSTRUCTIONS
Medicare Wellness Visit, Male    The best way to live healthy is to have a lifestyle where you eat a well-balanced diet, exercise regularly, limit alcohol use, and quit all forms of tobacco/nicotine, if applicable. Regular preventive services are another way to keep healthy. Preventive services (vaccines, screening tests, monitoring & exams) can help personalize your care plan, which helps you manage your own care. Screening tests can find health problems at the earliest stages, when they are easiest to treat. [facility] follows the current, evidence-based guidelines published by the [facility] ([facility]) when recommending preventive services for our patients. Because we follow these guidelines, sometimes recommendations change over time as research supports it. (For example, a prostate screening blood test is no longer routinely recommended for men with no symptoms.)  Of course, you and your doctor may decide to screen more often for some diseases, based on your risk and co-morbidities (chronic disease you are already diagnosed with). Preventive services for you include:  - Medicare offers their members a free annual wellness visit, which is time for you and your primary care provider to discuss and plan for your preventive service needs. Take advantage of this benefit every year!  -All adults over age 72 should receive the recommended pneumonia vaccines. Current USPSTF guidelines recommend a series of two vaccines for the best pneumonia protection.   -All adults should have a flu vaccine yearly and an ECG.  All adults age 61 and older should receive a shingles vaccine once in their lifetime.    -All adults age 38-68 who are overweight should have a diabetes screening test once every three years.   -Other screening tests & preventive services for persons with diabetes include: an eye exam to screen for diabetic retinopathy, a kidney function test, a foot exam, and stricter control over your cholesterol.   -Cardiovascular screening for adults with routine risk involves an electrocardiogram (ECG) at intervals determined by the provider.   -Colorectal cancer screening should be done for adults age 54-65 with no increased risk factors for colorectal cancer. There are a number of acceptable methods of screening for this type of cancer. Each test has its own benefits and drawbacks. Discuss with your provider what is most appropriate for you during your annual wellness visit. The different tests include: colonoscopy (considered the best screening method), a fecal occult blood test, a fecal DNA test, and sigmoidoscopy.  -All adults born between [facility] should be screened once for Hepatitis C.  -An Abdominal Aortic Aneurysm (AAA) Screening is recommended for men age 73-68 who has ever smoked in their lifetime. Here is a list of your current Health Maintenance items (your personalized list of preventive services) with a due date:  Health Maintenance Due   Topic Date Due    DTaP/Tdap/Td  (1 - Tdap) 10/05/1972    Shingles Vaccine (1 of 2) 10/05/2001    Eye Exam  12/06/2017    Annual Well Visit  03/14/2018    Glaucoma Screening   12/06/2018          Prostate Cancer Screening: Care Instructions  Your Care Instructions    The prostate gland is an organ found just below a man's bladder. It is the size and shape of a walnut. It surrounds the tube that carries urine from the bladder out of the body through the penis. This tube is called the urethra. Prostate cancer is the abnormal growth of cells in the prostate. It is the second most common type of cancer in men. (Skin cancer is the most common.)  Most cases of prostate cancer occur in men older than 72. The disease runs in families. And it's more common in -American men. When it's found and treated early, prostate cancer may be cured. But it is not always treated.  This is because prostate cancer may not shorten your life, especially if you are older and the cancer is growing slowly. Follow-up care is a key part of your treatment and safety. Be sure to make and go to all appointments, and call your doctor if you are having problems. It's also a good idea to know your test results and keep a list of the medicines you take. What are the screening tests for prostate cancer? The main screening test for prostate cancer is the prostate-specific antigen (PSA) test. This is a blood test that measures how much PSA is in your blood. A high level may mean that you have an enlargement, an infection, or cancer. Along with the PSA test, you may have a digital rectal exam. The digital (finger) rectal exam checks for anything abnormal in your prostate. To do the exam, the doctor puts a lubricated, gloved finger into your rectum. If these tests suggest cancer, you may need a prostate biopsy. How is prostate cancer diagnosed? In a biopsy, the doctor takes small tissue samples from your prostate gland. Another doctor then looks at the tissue under a microscope to see if there are cancer cells, signs of infection, or other problems. The results help diagnose prostate cancer. What are the pros and cons of screening? Neither a PSA test nor a digital rectal exam can tell you for sure that you do or do not have cancer. But they can help you decide if you need more tests, such as a prostate biopsy. Screening tests may be useful because most men with prostate cancer don't have symptoms. It can be hard to know if you have cancer until it is more advanced. And then it's harder to treat. But having a PSA test can also cause harm. The test may show high levels of PSA that aren't caused by cancer. So you could have a prostate biopsy you didn't need. Or the PSA test might be normal when there is cancer, so a cancer might not be found early. The test can also find cancers that would never have caused a problem during your lifetime.  So you might have treatment that was not needed. Prostate cancer usually develops late in life and grows slowly. For many men, it does not shorten their lives. Some experts advise screening only for men who are at high risk. Talk with your doctor to see if screening is right for you. Where can you learn more? Go to http://pardeep-harjit.info/. Enter R550 in the search box to learn more about \"Prostate Cancer Screening: Care Instructions. \"  Current as of: March 28, 2018  Content Version: 11.8  © 9780-1522 [employer]. Care instructions adapted under license by [employer] (which disclaims liability or warranty for this information). If you have questions about a medical condition or this instruction, always ask your healthcare professional. [address] any warranty or liability for your use of this information. High Cholesterol: Care Instructions  Your Care Instructions    Cholesterol is a type of fat in your blood. It is needed for many body functions, such as making new cells. Cholesterol is made by your body. It also comes from food you eat. High cholesterol means that you have too much of the fat in your blood. This raises your risk of a heart attack and stroke. LDL and HDL are part of your total cholesterol. LDL is the \"bad\" cholesterol. High LDL can raise your risk for heart disease, heart attack, and stroke. HDL is the \"good\" cholesterol. It helps clear bad cholesterol from the body. High HDL is linked with a lower risk of heart disease, heart attack, and stroke. Your cholesterol levels help your doctor find out your risk for having a heart attack or stroke. You and your doctor can talk about whether you need to lower your risk and what treatment is best for you. A heart-healthy lifestyle along with medicines can help lower your cholesterol and your risk.  The way you choose to lower your risk will depend on how high your risk is for heart attack and stroke. It will also depend on how you feel about taking medicines. Follow-up care is a key part of your treatment and safety. Be sure to make and go to all appointments, and call your doctor if you are having problems. It's also a good idea to know your test results and keep a list of the medicines you take. How can you care for yourself at home? · Eat a variety of foods every day. Good choices include fruits, vegetables, whole grains (like oatmeal), dried beans and peas, nuts and seeds, soy products (like tofu), and fat-free or low-fat dairy products. · Replace butter, margarine, and hydrogenated or partially hydrogenated oils with olive and canola oils. (Canola oil margarine without trans fat is fine.)  · Replace red meat with fish, poultry, and soy protein (like tofu). · Limit processed and packaged foods like chips, crackers, and cookies. · Bake, broil, or steam foods. Don't sloan them. · Be physically active. Get at least 30 minutes of exercise on most days of the week. Walking is a good choice. You also may want to do other activities, such as running, swimming, cycling, or playing tennis or team sports. · Stay at a healthy weight or lose weight by making the changes in eating and physical activity listed above. Losing just a small amount of weight, even 5 to 10 pounds, can reduce your risk for having a heart attack or stroke. · Do not smoke. When should you call for help? Watch closely for changes in your health, and be sure to contact your doctor if:    · You need help making lifestyle changes.     · You have questions about your medicine. Where can you learn more? Go to http://pardeep-harjit.info/. Enter L217 in the search box to learn more about \"High Cholesterol: Care Instructions. \"  Current as of: December 6, 2017  Content Version: 11.8  © 5867-2034 [employer].  Care instructions adapted under license by [employer] (which disclaims liability or warranty for this information). If you have questions about a medical condition or this instruction, always ask your healthcare professional. [address] any warranty or liability for your use of this information. Learning About Living [facility]  What is a living will? A living will is a legal form you use to write down the kind of care you want at the end of your life. It is used by the health professionals who will treat you if you aren't able to decide for yourself. If you put your wishes in writing, your loved ones and others will know what kind of care you want. They won't need to guess. This can ease your mind and be helpful to others. A living will is not the same as an estate or property will. An estate will explains what you want to happen with your money and property after you die. Is a living will a legal document? A living will is a legal document. Each state has its own laws about living mireles. If you move to another state, make sure that your living will is legal in the state where you now live. Or you might use a universal form that has been approved by many states. This kind of form can sometimes be completed and stored online. Your electronic copy will then be available wherever you have a connection to the Internet. In most cases, doctors will respect your wishes even if you have a form from a different state. · You don't need an  to complete a living will. But legal advice can be helpful if your state's laws are unclear, your health history is complicated, or your family can't agree on what should be in your living will. · You can change your living will at any time. Some people find that their wishes about end-of-life care change as their health changes. · In addition to making a living will, think about completing a medical power of  form.  This form lets you name the person you want to make end-of-life treatment decisions for you (your \"health care agent\") if you're not able to. Many hospitals and nursing homes will give you the forms you need to complete a living will and a medical power of . · Your living will is used only if you can't make or communicate decisions for yourself anymore. If you become able to make decisions again, you can accept or refuse any treatment, no matter what you wrote in your living will. · Your state may offer an online registry. This is a place where you can store your living will online so the doctors and nurses who need to treat you can find it right away. What should you think about when creating a living will? Talk about your end-of-life wishes with your family members and your doctor. Let them know what you want. That way the people making decisions for you won't be surprised by your choices. Think about these questions as you make your living will:  · Do you know enough about life support methods that might be used? If not, talk to your doctor so you know what might be done if you can't breathe on your own, your heart stops, or you're unable to swallow. · What things would you still want to be able to do after you receive life-support methods? Would you want to be able to walk? To speak? To eat on your own? To live without the help of machines? · If you have a choice, where do you want to be cared for? In your home? At a hospital or nursing home? · Do you want certain Denominational practices performed if you become very ill? · If you have a choice at the end of your life, where would you prefer to die? At home? In a hospital or nursing home? Somewhere else? · Would you prefer to be buried or cremated? · Do you want your organs to be donated after you die? What should you do with your living will? · Make sure that your family members and your health care agent have copies of your living will. · Give your doctor a copy of your living will to keep in your medical record.  If you have more than one doctor, make sure that each one has a copy. · You may want to put a copy of your living will where it can be easily found. Where can you learn more? Go to http://pardeep-harjit.info/. Enter F466 in the search box to learn more about \"Learning About Living Johanna Gay. \"  Current as of: August 8, 2016  Content Version: 11.3  © 1678-3667 [employer]. Care instructions adapted under license by [employer] (which disclaims liability or warranty for this information). If you have questions about a medical condition or this instruction, always ask your healthcare professional. [address] any warranty or liability for your use of this information.

## 2018-10-24 LAB — PSA SERPL-MCNC: 0.7 NG/ML (ref 0–4)

## 2018-10-31 ENCOUNTER — TELEPHONE (OUTPATIENT)
Dept: FAMILY MEDICINE CLINIC | Age: 68
End: 2018-10-31

## 2018-10-31 RX ORDER — BENZOCAINE .13; .15; .5; 2 G/100G; G/100G; G/100G; G/100G
2 GEL ORAL DAILY
Qty: 1 BOTTLE | Refills: 1 | Status: SHIPPED | OUTPATIENT
Start: 2018-10-31 | End: 2019-02-28 | Stop reason: ALTCHOICE

## 2018-10-31 RX ORDER — CETIRIZINE HCL 10 MG
10 TABLET ORAL
Qty: 30 TAB | Refills: 2 | Status: SHIPPED | OUTPATIENT
Start: 2018-10-31 | End: 2019-09-16

## 2018-10-31 NOTE — TELEPHONE ENCOUNTER
Pt's wife Leon Soto is calling in regards to having mediation called into pharmacy.  Which were recommended by Dr. Ngoc adame    Medication are  - zyrtec tablets  -rhinocortt allergie noise spray       Pharmacy on file verified      Best call back [phone]

## 2018-10-31 NOTE — TELEPHONE ENCOUNTER
Outbound call to patients wife who is on PHI. Patients name and  verified. Notified wife that RX was sent to the pharmacy.

## 2018-11-06 NOTE — ED TRIAGE NOTES
Pt referred to ED by Dr. Magnus Sun for admission pt is having a triple bypass on Monday.  Potassium 6.5 with labs drawn 7/5
- - -

## 2018-11-07 RX ORDER — ATORVASTATIN CALCIUM 20 MG/1
TABLET, FILM COATED ORAL
Qty: 90 TAB | Refills: 1 | Status: SHIPPED | OUTPATIENT
Start: 2018-11-07 | End: 2019-05-31 | Stop reason: SDUPTHER

## 2018-11-07 NOTE — TELEPHONE ENCOUNTER
Request for Lipitor 20mg daily. Last office visit 9-6-18, next office visit 1-7-19.  Refills per verbal order from Dr. Kajal Rodriguez.

## 2018-12-20 RX ORDER — HYDROCHLOROTHIAZIDE 25 MG/1
TABLET ORAL
Qty: 180 TAB | Refills: 3 | Status: SHIPPED | OUTPATIENT
Start: 2018-12-20 | End: 2019-01-10

## 2018-12-20 NOTE — TELEPHONE ENCOUNTER
Request for HCTZ 25mg two tabs daily. Last office visit 9-6-18, next office visit 1-7-19.  Refills per verbal order from Dr. Liddie Mcburney.

## 2019-01-10 ENCOUNTER — OFFICE VISIT (OUTPATIENT)
Dept: CARDIOLOGY CLINIC | Age: 69
End: 2019-01-10

## 2019-01-10 VITALS
WEIGHT: 141 LBS | OXYGEN SATURATION: 99 % | HEART RATE: 103 BPM | RESPIRATION RATE: 16 BRPM | HEIGHT: 63 IN | DIASTOLIC BLOOD PRESSURE: 68 MMHG | SYSTOLIC BLOOD PRESSURE: 160 MMHG | BODY MASS INDEX: 24.98 KG/M2

## 2019-01-10 DIAGNOSIS — Z95.1 S/P CABG (CORONARY ARTERY BYPASS GRAFT): ICD-10-CM

## 2019-01-10 DIAGNOSIS — Z78.9 STATIN INTOLERANCE: ICD-10-CM

## 2019-01-10 DIAGNOSIS — E78.00 HYPERCHOLESTEREMIA: ICD-10-CM

## 2019-01-10 DIAGNOSIS — I10 HYPERTENSION, ESSENTIAL: ICD-10-CM

## 2019-01-10 DIAGNOSIS — I25.10 CORONARY ARTERY DISEASE INVOLVING NATIVE CORONARY ARTERY OF NATIVE HEART WITHOUT ANGINA PECTORIS: Primary | ICD-10-CM

## 2019-01-10 NOTE — PROGRESS NOTES
Neal Kendall     1950       Monik Dobbins MD, [facility] [place]  Date of Visit-1/10/2019   PCP is Ajay Kelly MD   [address] [place] [address] [facility] [place]  [facility]  HPI:  Cass Saini is a 80 y.o. male   2 month f/u of CAD with recent CABG 6/9/18. Prior NSTEMI in November 2016 and resolved pulmonary HTN. Pt has occasional left shoulder pain since surgery, but he is feeling great and states that he's felt a difference since surgery. Pt states that he was never contacted about cardio rehab, but he did try to walk regularly. Denies chest pain, edema, syncope or shortness of breath at rest, has no tachycardia, palpitations or sense of arrhythmia. Assessment/Plan:     1. CAD s/p CABG has done very well. We would like to get him in Cardiac rehab as inquired. 2. HTN stable. 3. Dyslipidemia LDL is excellent 54 cholesterol is good     4. Diabetes main risk factor     5. F/u in 6 months ;echo next available. Impression:   1. Coronary artery disease involving native coronary artery of native heart without angina pectoris    2. Hypertension, essential    3. Hypercholesteremia    4. S/P CABG (coronary artery bypass graft)    5. Statin intolerance       Cardiac History:   CABG x 3  7-9-18= LIMA to LAD, SVG-OM, SVG-dRCA   Echo 6-22-18 EF 55-60%, mod MAC, aortic sclerosis without stenosis , mild TR  LISA 6-18-18 4 minutes +moderate hypokinesis of the mid anteroseptal, entire inferior, apical septal, and apical wall(s). With exercise a mild reduction in LV function. PCI 11-11-16 JEREMY to mLAD 95%  JEREMY to OM1 90%     ROS-except as noted above. . A complete cardiac and respiratory are reviewed and negative except as above ; Resp-denies wheezing  or productive cough,.  Const- No unusual weight loss or fever; Neuro-no recent seizure or CVA ; GI- No BRBPR, abdom pain, bloating ; - no  hematuria   see supplement sheet, initialed and to be scanned by staff  Past Medical History:   Diagnosis Date    CAD (coronary artery disease) 11/10/2016    NSTEMI & 2 stents    Deafness 10/28/2012    DM (diabetes mellitus) ([facility]     Elevated cholesterol     Hypertension     NSTEMI (non-ST elevated myocardial infarction) ([facility] 11/10/2016      Social Hx= reports that  has never smoked. he has never used smokeless tobacco. He reports that he drinks about 1.2 oz of alcohol per week. He reports that he does not use drugs. Exam and Labs:  /68 (BP 1 Location: Right arm, BP Patient Position: Sitting)   Pulse (!) 103   Resp 16   Ht 5' 3\" (1.6 m)   Wt 141 lb (64 kg)   SpO2 99%   BMI 24.98 kg/m² Constitutional:  NAD, comfortable  Head: NC,AT. Eyes: No scleral icterus. Neck:  Neck supple. No JVD present. Throat: moist mucous membranes. Chest: Effort normal & normal respiratory excursion . Neurological: alert, conversant and oriented . Skin: Skin is not cold. No obvious systemic rash noted. Not diaphoretic. No erythema. Psychiatric:  Grossly normal mood and affect. Behavior appears normal. Extremities:  no clubbing or cyanosis. Abdomen: non distended    Lungs:breath sounds normal. No stridor. distress, wheezes or  Rales. Heart: 1/6 ANDREA normal rate, regular rhythm, normal S1, S2, no murmurs, rubs, clicks or gallops , PMI non displaced. Edema: Edema is none.   Lab Results   Component Value Date/Time    Cholesterol, total 132 09/05/2018 08:19 AM    HDL Cholesterol 46 09/05/2018 08:19 AM    LDL, calculated 54 09/05/2018 08:19 AM    Triglyceride 162 (H) 09/05/2018 08:19 AM    CHOL/HDL Ratio 4.3 06/22/2018 10:11 AM     Lab Results   Component Value Date/Time    Sodium 138 09/05/2018 08:19 AM    Potassium 6.0 (H) 09/05/2018 08:19 AM    Chloride 103 09/05/2018 08:19 AM    CO2 20 09/05/2018 08:19 AM    Anion gap 7 07/18/2018 04:22 AM    Glucose 159 (H) 09/05/2018 08:19 AM    BUN 13 09/05/2018 08:19 AM    Creatinine 1.04 09/05/2018 08:19 AM    BUN/Creatinine ratio 13 09/05/2018 08:19 AM GFR est AA 86 09/05/2018 08:19 AM    GFR est non-AA 74 09/05/2018 08:19 AM    Calcium 9.8 09/05/2018 08:19 AM      Wt Readings from Last 3 Encounters:   01/10/19 141 lb (64 kg)   10/23/18 134 lb (60.8 kg)   09/06/18 135 lb (61.2 kg)      BP Readings from Last 3 Encounters:   01/10/19 160/68   10/23/18 124/80   09/06/18 110/70      Current Outpatient Medications   Medication Sig    atorvastatin (LIPITOR) 20 mg tablet TAKE 1 TABLET BY MOUTH NIGHTLY    metoprolol tartrate (LOPRESSOR) 100 mg IR tablet Take 1 Tab by mouth two (2) times a day.  losartan (COZAAR) 50 mg tablet Take 2 Tabs by mouth daily.  metformin HCl (METFORMIN PO) Take  by mouth two (2) times a day.  JANUVIA 50 mg tablet TAKE ONE TABLET BY MOUTH ONCE DAILY    traZODone (DESYREL) 50 mg tablet Take 0.5 Tabs by mouth nightly.  hydroCHLOROthiazide (HYDRODIURIL) 25 mg tablet Take 2 Tabs by mouth daily (after breakfast).  glipiZIDE (GLUCOTROL) 10 mg tablet Take 0.5 Tabs by mouth two (2) times a day. Different dose than what you were taking    aspirin 81 mg chewable tablet Take 1 Tab by mouth daily.  cetirizine (ZYRTEC) 10 mg tablet Take 1 Tab by mouth nightly.  budesonide (RHINOCORT AQUA) 32 mcg/actuation nasal spray 2 Sprays by Both Nostrils route daily.  PREVNAR 13, PF, 0.5 mL syrg injection     varicella-zoster recombinant, PF, (SHINGRIX, PF,) 50 mcg/0.5 mL susr injection 0.5mL by IntraMUSCular route once now and then repeat in 2-6 months    cyanocobalamin (VITAMIN B12) 500 mcg tablet Take 1 Tab by mouth daily. No current facility-administered medications for this visit. Impression see above.       Written by Nedra Martinez, as dictated by Alfred Tobias MD.

## 2019-01-10 NOTE — PROGRESS NOTES
Visit Vitals  /68 (BP 1 Location: Right arm, BP Patient Position: Sitting)   Pulse (!) 103   Resp 16   Ht 5' 3\" (1.6 m)   Wt 141 lb (64 kg)   SpO2 99%   BMI 24.98 kg/m²

## 2019-01-10 NOTE — PATIENT INSTRUCTIONS
You will be scheduled for an echocardiagram in about a month.  You will also be scheduled for a 6 month office visit with Dr. Lauralee Aschoff.

## 2019-01-15 ENCOUNTER — CLINICAL SUPPORT (OUTPATIENT)
Dept: CARDIOLOGY CLINIC | Age: 69
End: 2019-01-15

## 2019-01-15 DIAGNOSIS — I35.0 NONRHEUMATIC AORTIC VALVE STENOSIS: ICD-10-CM

## 2019-01-15 DIAGNOSIS — Z95.1 S/P CABG X 3: ICD-10-CM

## 2019-01-15 DIAGNOSIS — I25.10 CORONARY ARTERY DISEASE INVOLVING NATIVE HEART, ANGINA PRESENCE UNSPECIFIED, UNSPECIFIED VESSEL OR LESION TYPE: Primary | ICD-10-CM

## 2019-01-17 ENCOUNTER — TELEPHONE (OUTPATIENT)
Dept: CARDIOLOGY CLINIC | Age: 69
End: 2019-01-17

## 2019-01-18 NOTE — TELEPHONE ENCOUNTER
Verified patient with two types of identifiers. Spoke to patient's wife Gertrudis Ontiveros (on HIPAA form) and gave her results of patient's recent ECHO per MD. Wife verbalized understanding.

## 2019-02-28 ENCOUNTER — OFFICE VISIT (OUTPATIENT)
Dept: FAMILY MEDICINE CLINIC | Age: 69
End: 2019-02-28

## 2019-02-28 VITALS
BODY MASS INDEX: 25.16 KG/M2 | RESPIRATION RATE: 16 BRPM | TEMPERATURE: 98.3 F | DIASTOLIC BLOOD PRESSURE: 70 MMHG | WEIGHT: 142 LBS | SYSTOLIC BLOOD PRESSURE: 122 MMHG | OXYGEN SATURATION: 98 % | HEART RATE: 74 BPM | HEIGHT: 63 IN

## 2019-02-28 DIAGNOSIS — M54.12 CERVICAL RADICULOPATHY: Primary | ICD-10-CM

## 2019-02-28 DIAGNOSIS — I10 HYPERTENSION, ESSENTIAL: Primary | ICD-10-CM

## 2019-02-28 DIAGNOSIS — E11.65 TYPE 2 DIABETES MELLITUS WITH HYPERGLYCEMIA, WITHOUT LONG-TERM CURRENT USE OF INSULIN (HCC): ICD-10-CM

## 2019-02-28 DIAGNOSIS — M77.8 SHOULDER TENDONITIS, RIGHT: ICD-10-CM

## 2019-02-28 RX ORDER — DICLOFENAC SODIUM 75 MG/1
75 TABLET, DELAYED RELEASE ORAL 2 TIMES DAILY
Qty: 30 TAB | Refills: 0 | Status: SHIPPED | OUTPATIENT
Start: 2019-02-28 | End: 2019-09-16

## 2019-02-28 RX ORDER — PREDNISONE 10 MG/1
TABLET ORAL
Qty: 21 TAB | Refills: 0 | Status: SHIPPED | OUTPATIENT
Start: 2019-02-28 | End: 2019-03-07 | Stop reason: ALTCHOICE

## 2019-02-28 NOTE — PROGRESS NOTES
HISTORY OF PRESENT ILLNESS  Evonne Perez is a 80 y.o. male. was seen for neck pain and right shoulder pain ,which is worsening. HPI  Neck Pain  Patient complains of neck pain. Onset of symptoms was several days ago, gradually worsening since that time. Current symptoms are pain in neck (sharp, stabbing, shooting in character; 6/10 in severity), stiffness in neck and upper back, both shoulders, denies weakness, denies numbness. Patient denies numbness, tingling, paresthesias in upper extremities. Patient denies weakness, diminished  strength, lack of coordination. Radiation of pain:  Event that precipitate these symptoms: none known. Patient has had recurrent self limited episodes of neck pain in the past.  Previous treatments include: none. Shoulder Pain  Patient complains of right side shoulder pain. The symptoms began several days ago Course of symptoms since onset has been gradually worsening. Pain is described as overall severity = moderate, location: poorly localized, worse with overhead movements, worse at night and other associated symptoms: pain radiating to arm/hand, interference with performing ADL's and lifestyle duties. Symptoms were incited by no known event. Patient denies fever, alcohol overuse. Therapy to date includes none. Review of Systems   Constitutional: Negative for chills, fever and malaise/fatigue. HENT: Negative for congestion, ear pain, sore throat and tinnitus. Eyes: Negative for blurred vision, double vision, pain and discharge. Respiratory: Negative for cough, shortness of breath and wheezing. Cardiovascular: Negative for chest pain, palpitations and leg swelling. Gastrointestinal: Negative for abdominal pain, blood in stool, constipation, diarrhea, nausea and vomiting. Genitourinary: Negative for dysuria, frequency, hematuria and urgency. Musculoskeletal: Positive for neck pain. Negative for back pain, joint pain and myalgias.         Right shoulder pain Skin: Negative for rash. Neurological: Negative for dizziness, tremors, seizures and headaches. Endo/Heme/Allergies: Negative for polydipsia. Does not bruise/bleed easily. Psychiatric/Behavioral: Negative for depression and substance abuse. The patient is not nervous/anxious. Physical Exam   Constitutional: He is oriented to person, place, and time. He appears well-developed and well-nourished. HENT:   Head: Normocephalic and atraumatic. Right Ear: External ear normal.   Mouth/Throat: Oropharynx is clear and moist. No oropharyngeal exudate. Eyes: Conjunctivae and EOM are normal. Pupils are equal, round, and reactive to light. No scleral icterus. Neck: Neck supple. No JVD present. Spinous process tenderness and muscular tenderness present. Decreased range of motion present. No thyromegaly present. Cardiovascular: Normal rate, regular rhythm, normal heart sounds and intact distal pulses. No murmur heard. Pulmonary/Chest: Effort normal and breath sounds normal. He has no wheezes. Abdominal: Soft. Bowel sounds are normal. He exhibits no distension and no mass. Musculoskeletal: He exhibits no edema. Right shoulder: He exhibits decreased range of motion and tenderness. He exhibits no bony tenderness. Right Shoulder- Empty Can Test: Positive. Drop Arm Test: Positive. Cross-Chest Test: Positive. NEER test Positive  Ochoa' test Positive     Lymphadenopathy:     He has no cervical adenopathy. Neurological: He is alert and oriented to person, place, and time. He has normal reflexes. No cranial nerve deficit. Skin: Skin is warm and dry. No rash noted. He is not diaphoretic. Psychiatric: He has a normal mood and affect. Nursing note and vitals reviewed. ASSESSMENT and PLAN  Diagnoses and all orders for this visit:    1.  Cervical radiculopathy  -     XR SPINE CERV PA LAT ODONT 3 V MAX; Future  -     predniSONE (STERAPRED DS) 10 mg dose pack; See administration instruction per 10mg dose pack    2. Type 2 diabetes mellitus with hyperglycemia, without long-term current use of insulin (HCC)  Assessment & Plan:  Improving, based on history, physical exam and review of pertinent labs, studies and medications; meds reconciled; continue current treatment plan, lifestyle modifications recommended, medication compliance emphasized. Key Antihyperglycemic Medications             metformin HCl (METFORMIN PO)  (Taking) Take  by mouth two (2) times a day. JANUVIA 50 mg tablet  (Taking) TAKE ONE TABLET BY MOUTH ONCE DAILY    glipiZIDE (GLUCOTROL) 10 mg tablet  (Taking) Take 0.5 Tabs by mouth two (2) times a day. Different dose than what you were taking        Other Key Diabetic Medications             atorvastatin (LIPITOR) 20 mg tablet  (Taking) TAKE 1 TABLET BY MOUTH NIGHTLY    losartan (COZAAR) 50 mg tablet  (Taking) Take 2 Tabs by mouth daily. Lab Results   Component Value Date/Time    Hemoglobin A1c 7.0 09/05/2018 08:19 AM    Glucose 159 09/05/2018 08:19 AM    Creatinine 1.04 09/05/2018 08:19 AM    Microalb/Creat ratio (ug/mg creat.) 1,272.7 09/05/2018 08:19 AM    Cholesterol, total 132 09/05/2018 08:19 AM    HDL Cholesterol 46 09/05/2018 08:19 AM    LDL, calculated 54 09/05/2018 08:19 AM    Triglyceride 162 09/05/2018 08:19 AM     Diabetic Foot and Eye Exam HM Status   Topic Date Due    Diabetic Foot Care  08/27/2019    Eye Exam  10/22/2020       Orders:  -     flash glucose sensor (FREESTYLE ERIKA 14 DAY SENSOR) kit; Check sugar one time daily  -     flash glucose scanning reader (FREESTYLE ERIKA 14 DAY READER) misc; Check sugar one time daily, in morning,range     3. Shoulder tendonitis, right  -     XR SHOULDER RT AP/LAT MIN 2 V; Future  -     diclofenac EC (VOLTAREN) 75 mg EC tablet; Take 1 Tab by mouth two (2) times a day.     Discussed neck and shoulder exercises and demonstration was  given  Patient was given an after visit summary which includes diagnoses, vital signs, current medications, instructions and references & authorized prescriptions . Results of labs will be conveyed to patient, once available. Pt verbalized instructions I provided and expressed understanding of discussion that was held today. Follow-up Disposition:  Return for fasting, follow up.

## 2019-02-28 NOTE — ASSESSMENT & PLAN NOTE
Improving, based on history, physical exam and review of pertinent labs, studies and medications; meds reconciled; continue current treatment plan, lifestyle modifications recommended, medication compliance emphasized. Key Antihyperglycemic Medications             metformin HCl (METFORMIN PO)  (Taking) Take  by mouth two (2) times a day. JANUVIA 50 mg tablet  (Taking) TAKE ONE TABLET BY MOUTH ONCE DAILY    glipiZIDE (GLUCOTROL) 10 mg tablet  (Taking) Take 0.5 Tabs by mouth two (2) times a day. Different dose than what you were taking        Other Key Diabetic Medications             atorvastatin (LIPITOR) 20 mg tablet  (Taking) TAKE 1 TABLET BY MOUTH NIGHTLY    losartan (COZAAR) 50 mg tablet  (Taking) Take 2 Tabs by mouth daily.         Lab Results   Component Value Date/Time    Hemoglobin A1c 7.0 09/05/2018 08:19 AM    Glucose 159 09/05/2018 08:19 AM    Creatinine 1.04 09/05/2018 08:19 AM    Microalb/Creat ratio (ug/mg creat.) 1,272.7 09/05/2018 08:19 AM    Cholesterol, total 132 09/05/2018 08:19 AM    HDL Cholesterol 46 09/05/2018 08:19 AM    LDL, calculated 54 09/05/2018 08:19 AM    Triglyceride 162 09/05/2018 08:19 AM     Diabetic Foot and Eye Exam HM Status   Topic Date Due    Diabetic Foot Care  08/27/2019    Eye Exam  10/22/2020

## 2019-02-28 NOTE — PATIENT INSTRUCTIONS
Pinched Nerve in the Neck: Care Instructions  Your Care Instructions  A pinched nerve in the neck happens when a vertebra or disc in the upper part of your spine is damaged. This damage can happen because of an injury. Or it can just happen with age. The changes caused by the damage may put pressure on a nearby nerve root, pinching it. This causes symptoms such as sharp pain in your neck, shoulder, arm, hand, or back. You may also have tingling or numbness. Sometimes it makes your arm weaker. The symptoms are usually worse when you turn your head or strain your neck. For many people, the symptoms get better over time and finally go away. Early treatment usually includes medicines for pain and swelling. Sometimes physical therapy and special exercises may help. Follow-up care is a key part of your treatment and safety. Be sure to make and go to all appointments, and call your doctor if you are having problems. It's also a good idea to know your test results and keep a list of the medicines you take. How can you care for yourself at home? · Be safe with medicines. Read and follow all instructions on the label. ? If the doctor gave you a prescription medicine for pain, take it as prescribed. ? If you are not taking a prescription pain medicine, ask your doctor if you can take an over-the-counter medicine. · Try using a heating pad on a low or medium setting for 15 to 20 minutes every 2 or 3 hours. Try a warm shower in place of one session with the heating pad. You can also buy single-use heat wraps that last up to 8 hours. · You can also try an ice pack for 10 to 15 minutes every 2 to 3 hours. There isn't strong evidence that either heat or ice will help. But you can try them to see if they help you. · Don't spend too long in one position. Take short breaks to move around and change positions. · Wear a seat belt and shoulder harness when you are in a car.   · Sleep with a pillow under your head and neck that keeps your neck straight. · If you were given a neck brace (cervical collar) to limit neck motion, wear it as instructed for as many days as your doctor tells you to. Do not wear it longer than you were told to. Wearing a brace for too long can lead to neck stiffness and can weaken the neck muscles. · Follow your doctor's instructions for gentle neck-stretching exercises. · Do not smoke. Smoking can slow healing of your discs. If you need help quitting, talk to your doctor about stop-smoking programs and medicines. These can increase your chances of quitting for good. · Avoid strenuous work or exercise until your doctor says it is okay. When should you call for help? Call 911 anytime you think you may need emergency care. For example, call if:    · You are unable to move an arm or a leg at all.   [facility] your doctor now or seek immediate medical care if:    · You have new or worse symptoms in your arms, legs, chest, belly, or buttocks. Symptoms may include:  ? Numbness or tingling. ? Weakness. ? Pain.     · You lose bladder or bowel control.    Watch closely for changes in your health, and be sure to contact your doctor if:    · You are not getting better as expected. Where can you learn more? Go to http://pardeep-harjit.info/. Enter S420 in the search box to learn more about \"Pinched Nerve in the Neck: Care Instructions. \"  Current as of: September 20, 2018  Content Version: 11.9  © 9890-5708 [employer]. Care instructions adapted under license by [employer] (which disclaims liability or warranty for this information). If you have questions about a medical condition or this instruction, always ask your healthcare professional. [place] [address] any warranty or liability for your use of this information.

## 2019-02-28 NOTE — PROGRESS NOTES
Chief Complaint   Patient presents with    Shoulder Pain     right shoulder with radiating pain to neck . difficulty driving and resting at night      1. Have you been to the ER, urgent care clinic since your last visit? Hospitalized since your last visit? No    2. Have you seen or consulted any other health care providers outside of the [address] [place] [address] [place] since your last visit? Include any pap smears or colon screening.  No

## 2019-03-01 ENCOUNTER — TELEPHONE (OUTPATIENT)
Dept: FAMILY MEDICINE CLINIC | Age: 69
End: 2019-03-01

## 2019-03-01 DIAGNOSIS — E11.65 TYPE 2 DIABETES MELLITUS WITH HYPERGLYCEMIA, WITHOUT LONG-TERM CURRENT USE OF INSULIN (HCC): ICD-10-CM

## 2019-03-01 RX ORDER — METFORMIN HYDROCHLORIDE 1000 MG/1
TABLET ORAL
Qty: 180 TAB | Refills: 2 | Status: SHIPPED | OUTPATIENT
Start: 2019-03-01 | End: 2020-03-19

## 2019-03-01 RX ORDER — METOPROLOL TARTRATE 100 MG/1
100 TABLET ORAL 2 TIMES DAILY
Qty: 180 TAB | Refills: 2 | Status: SHIPPED | OUTPATIENT
Start: 2019-03-01 | End: 2019-06-18 | Stop reason: SDUPTHER

## 2019-03-01 RX ORDER — METOPROLOL TARTRATE 50 MG/1
50 TABLET ORAL 2 TIMES DAILY
Qty: 180 TAB | Refills: 2 | OUTPATIENT
Start: 2019-03-01

## 2019-03-01 RX ORDER — LOSARTAN POTASSIUM 50 MG/1
TABLET ORAL
Qty: 180 TAB | Refills: 1 | Status: SHIPPED | OUTPATIENT
Start: 2019-03-01 | End: 2019-03-13

## 2019-03-01 RX ORDER — SITAGLIPTIN 50 MG/1
TABLET, FILM COATED ORAL
Qty: 90 TAB | Refills: 1 | Status: SHIPPED | OUTPATIENT
Start: 2019-03-01 | End: 2019-09-21 | Stop reason: SDUPTHER

## 2019-03-01 RX ORDER — METOPROLOL TARTRATE 50 MG/1
TABLET ORAL
Qty: 60 TAB | Refills: 2 | Status: SHIPPED | OUTPATIENT
Start: 2019-03-01 | End: 2019-03-07 | Stop reason: DRUGHIGH

## 2019-03-01 NOTE — TELEPHONE ENCOUNTER
Request for metoprolol 100mg BID. Last office visit 1-10-19, next office visit 7-11-19.  Refills per verbal order from Dr. Klarissa Melendez.

## 2019-03-01 NOTE — TELEPHONE ENCOUNTER
Patient already has Glucometer. He asked me if he can get one that he is seeing in commercials. No need to prescribe alternate.

## 2019-03-01 NOTE — TELEPHONE ENCOUNTER
Pharmacy is requesting for medication alternative    Current medication flash glucose sensor (FREESTYLE ERIKA 14 DAY SENSOR) kit is not covered    Pharmacy Comments: Alfonso Cortez is not covered, can this be changed?  Please advise, Thank You    Pharmacy on file verified  ([id number] [phone])

## 2019-03-06 ENCOUNTER — TELEPHONE (OUTPATIENT)
Dept: FAMILY MEDICINE CLINIC | Age: 69
End: 2019-03-06

## 2019-03-06 NOTE — TELEPHONE ENCOUNTER
Patient wife is requesting to speak to Dr Manjinder Alejo, in regards to the patients symptoms not being better.   His symptoms  Really bad pain in R shoulder  Hand is not working as it suppose to, cant hold anything   LOV :  February 28, 2019   [facility]# [phone]

## 2019-03-07 ENCOUNTER — OFFICE VISIT (OUTPATIENT)
Dept: FAMILY MEDICINE CLINIC | Age: 69
End: 2019-03-07

## 2019-03-07 VITALS
SYSTOLIC BLOOD PRESSURE: 120 MMHG | BODY MASS INDEX: 24.8 KG/M2 | WEIGHT: 140 LBS | RESPIRATION RATE: 16 BRPM | TEMPERATURE: 97.6 F | OXYGEN SATURATION: 98 % | HEIGHT: 63 IN | DIASTOLIC BLOOD PRESSURE: 70 MMHG | HEART RATE: 68 BPM

## 2019-03-07 DIAGNOSIS — E11.65 TYPE 2 DIABETES MELLITUS WITH HYPERGLYCEMIA, WITHOUT LONG-TERM CURRENT USE OF INSULIN (HCC): Primary | ICD-10-CM

## 2019-03-07 DIAGNOSIS — I10 HYPERTENSION, ESSENTIAL: ICD-10-CM

## 2019-03-07 DIAGNOSIS — E78.5 DYSLIPIDEMIA, GOAL LDL BELOW 100: ICD-10-CM

## 2019-03-07 DIAGNOSIS — Z95.1 S/P CABG X 3: ICD-10-CM

## 2019-03-07 DIAGNOSIS — E53.8 VITAMIN B12 DEFICIENCY: ICD-10-CM

## 2019-03-07 DIAGNOSIS — M47.22 OSTEOARTHRITIS OF SPINE WITH RADICULOPATHY, CERVICAL REGION: ICD-10-CM

## 2019-03-07 DIAGNOSIS — M19.019 SHOULDER ARTHRITIS: ICD-10-CM

## 2019-03-07 PROBLEM — E11.21 TYPE 2 DIABETES WITH NEPHROPATHY (HCC): Status: RESOLVED | Noted: 2018-09-15 | Resolved: 2019-03-07

## 2019-03-07 PROBLEM — I21.4 NSTEMI (NON-ST ELEVATED MYOCARDIAL INFARCTION) (HCC): Status: RESOLVED | Noted: 2018-06-22 | Resolved: 2019-03-07

## 2019-03-07 RX ORDER — GLIPIZIDE 10 MG/1
TABLET ORAL
COMMUNITY
End: 2019-03-07 | Stop reason: DRUGHIGH

## 2019-03-07 RX ORDER — METFORMIN HYDROCHLORIDE 1000 MG/1
TABLET ORAL
COMMUNITY
End: 2019-03-07 | Stop reason: SDUPTHER

## 2019-03-07 RX ORDER — LOSARTAN POTASSIUM 25 MG/1
TABLET ORAL
COMMUNITY
End: 2019-03-07 | Stop reason: DRUGHIGH

## 2019-03-07 RX ORDER — CYANOCOBALAMIN 1000 UG/ML
1000 INJECTION, SOLUTION INTRAMUSCULAR; SUBCUTANEOUS ONCE
Qty: 1 ML | Refills: 0
Start: 2019-03-07 | End: 2019-03-07

## 2019-03-07 RX ORDER — ASPIRIN 81 MG/1
TABLET ORAL
COMMUNITY
End: 2019-03-07 | Stop reason: SDUPTHER

## 2019-03-07 NOTE — TELEPHONE ENCOUNTER
Talked to wife, discussed his Xray findings and different management options. Patient was already seen in office today and was referred for physical therapy.

## 2019-03-07 NOTE — PROGRESS NOTES
Chief Complaint   Patient presents with    Diabetes     follow up, nonfasting     Cholesterol Problem    Hypertension    Shoulder Pain     right, still experiencing right shoulder pain and heaviness radiating to neck . current medication has not help     Abdominal Pain     left lower abdomen     Patient is having difficulty with holding and picking up items. 1. Have you been to the ER, urgent care clinic since your last visit? Hospitalized since your last visit? No    2. Have you seen or consulted any other health care providers outside of the [address] [place] [address] since your last visit? Include any pap smears or colon screening.  No

## 2019-03-07 NOTE — PATIENT INSTRUCTIONS
Shoulder Arthritis: Exercises  Your Care Instructions  Here are some examples of typical rehabilitation exercises for your condition. Start each exercise slowly. Ease off the exercise if you start to have pain. Your doctor or physical therapist will tell you when you can start these exercises and which ones will work best for you. How to do the exercises  Shoulder flexion (lying down)    1. Lie on your back, holding a wand with both hands. Your palms should face down as you hold the wand. 2. Keeping your elbows straight, slowly raise your arms over your head. Raise them until you feel a stretch in your shoulders, upper back, and chest.  3. Hold for 15 to 30 seconds. 4. Repeat 2 to 4 times. Shoulder rotation (lying down)    1. Lie on your back. Hold a wand with both hands with your elbows bent and palms up. 2. Keep your elbows close to your body, and move the wand across your body toward the sore arm. 3. Hold for 8 to 12 seconds. 4. Repeat 2 to 4 times. Shoulder internal rotation with towel    1. Hold a towel above and behind your head with the arm that is not sore. 2. With your sore arm, reach behind your back and grasp the towel. 3. With the arm above your head, pull the towel upward. Do this until you feel a stretch on the front and outside of your sore shoulder. 4. Hold 15 to 30 seconds. 5. Repeat 2 to 4 times. Shoulder blade squeeze    1. Stand with your arms at your sides, and squeeze your shoulder blades together. Do not raise your shoulders up as you squeeze. 2. Hold 6 seconds. 3. Repeat 8 to 12 times. Resisted rows    1. Put the band around a solid object at about waist level. (A bedpost will work well.) Each hand should hold an end of the band. 2. With your elbows at your sides and bent to 90 degrees, pull the band back. Your shoulder blades should move toward each other. Return to the starting position. 3. Repeat 8 to 12 times.     External rotator strengthening exercise    1. Start by tying a piece of elastic exercise material to a doorknob. You can use surgical tubing or Thera-Band. (You may also hold one end of the band in each hand.)  2. Stand or sit with your shoulder relaxed and your elbow bent 90 degrees. Your upper arm should rest comfortably against your side. Squeeze a rolled towel between your elbow and your body for comfort. This will help keep your arm at your side. 3. Hold one end of the elastic band with the hand of the painful arm. 4. Start with your forearm across your belly. Slowly rotate the forearm out away from your body. Keep your elbow and upper arm tucked against the towel roll or the side of your body until you begin to feel tightness in your shoulder. Slowly move your arm back to where you started. 5. Repeat 8 to 12 times. Internal rotator strengthening exercise    1. Start by tying a piece of elastic exercise material to a doorknob. You can use surgical tubing or Thera-Band. 2. Stand or sit with your shoulder relaxed and your elbow bent 90 degrees. Your upper arm should rest comfortably against your side. Squeeze a rolled towel between your elbow and your body for comfort. This will help keep your arm at your side. 3. Hold one end of the elastic band in the hand of the painful arm. 4. Slowly rotate your forearm toward your body until it touches your belly. Slowly move it back to where you started. 5. Keep your elbow and upper arm firmly tucked against the towel roll or at your side. 6. Repeat 8 to 12 times. Pendulum swing    1. Hold on to a table or the back of a chair with your good arm. Then bend forward a little and let your sore arm hang straight down. This exercise does not use the arm muscles. Rather, use your legs and your hips to create movement that makes your arm swing freely. 2. Use the movement from your hips and legs to guide the slightly swinging arm back and forth like a pendulum (or elephant trunk).  Then guide it in circles that start small (about the size of a dinner plate). Make the circles a bit larger each day, as your pain allows. 3. Do this exercise for 5 minutes, 5 to 7 times each day. 4. As you have less pain, try bending over a little farther to do this exercise. This will increase the amount of movement at your shoulder. Follow-up care is a key part of your treatment and safety. Be sure to make and go to all appointments, and call your doctor if you are having problems. It's also a good idea to know your test results and keep a list of the medicines you take. Where can you learn more? Go to http://pardeep-harjit.info/. Enter H562 in the search box to learn more about \"Shoulder Arthritis: Exercises. \"  Current as of: September 20, 2018  Content Version: 11.9  © 2683-8193 [employer]. Care instructions adapted under license by [employer] (which disclaims liability or warranty for this information). If you have questions about a medical condition or this instruction, always ask your healthcare professional. [address] any warranty or liability for your use of this information.

## 2019-03-07 NOTE — PROGRESS NOTES
After obtaining consent, and per orders of Dr. Timo Leung , injection of Vitamin B 12 given by Jose Rao LPN. Patient instructed toto report any adverse reactions. B12 was administered in left deltoid via IM administration. Patient tolerated it well. No adverse reaction noted.

## 2019-03-07 NOTE — PROGRESS NOTES
HISTORY OF PRESENT ILLNESS  Cristobal Preciado is a 80 y.o. male. seen today for follow up on DM, HTN, dyslipidemia and also concern of worsening neck and shoulder pain.   HPI  Cardiovascular Review  The patient has hypertension, hyperlipidemia, coronary artery disease, status post coronary artery stenting and had 2 vessel PCI on 11/11/2016, s/p CABG x 3 on 07/09/2018. Italia Curran reports taking medications as instructed, no medication side effects noted, notes stable dyspnea on exertion, no change, no swelling of ankles, no orthostatic dizziness or lightheadedness, no orthopnea or paroxysmal nocturnal dyspnea, no palpitations, no intermittent claudication symptoms.  Diet and Lifestyle: generally follows a low fat low cholesterol diet, generally follows a low sodium diet, nonsmoker.  Lab review: labs reviewed and discussed with patient.  Follows [facility]  Medicines:ASA, Metoprolol 100 mg bid, Losartan 100 mg, Hctz 50 mg, Atorvastatin 20 mg     Lab Results   Component Value Date/Time    Cholesterol, total 132 09/05/2018 08:19 AM    HDL Cholesterol 46 09/05/2018 08:19 AM    LDL, calculated 54 09/05/2018 08:19 AM    VLDL, calculated 32 09/05/2018 08:19 AM    Triglyceride 162 (H) 09/05/2018 08:19 AM    CHOL/HDL Ratio 4.3 06/22/2018 10:11 AM        Endocrine Review  He is seen for diabetes.  Since last visit he reports: no polyuria or polydipsia, no chest pain, dyspnea or TIA's, no numbness, tingling or pain in extremities, no unusual visual symptoms, weight has decreased, no significant changes.  Home glucose monitoring: is performed sporadically, nonfasting values range 160-200  He is checking his sugars one per day.  He reports medication compliance: compliant all of the time.  Medication side effects: none.  Diabetic diet compliance: noncompliant some of the time.  Lab review: labs reviewed and discussed with patient.  Eye exam: UTD.    On  Glipizide 5 mg BID and Januvia 50 mg, Metformin 1 gm bid  Lab Results   Component Value Date/Time    Hemoglobin A1c 7.0 (H) 09/05/2018 08:19 AM      Neck Pain  Patient complains of neck pain. Onset of symptoms was several days ago, gradually worsening since that time. Current symptoms are pain in neck (sharp, stabbing, shooting in character; 6/10 in severity), stiffness in neck and upper back, both shoulders, denies weakness, denies numbness. Patient denies numbness, tingling, paresthesias in upper extremities. Patient denies weakness, diminished  strength, lack of coordination. Radiation of pain:  Event that precipitate these symptoms: none known. Patient has had recurrent self limited episodes of neck pain in the past.  Previous treatments include: Diclofenac 75 mg bid  He had Xray of neck which showed significant DJD in cervical spine with foraminal stenosis     Shoulder Pain  Patient complains of right side shoulder pain. The symptoms began several days ago Course of symptoms since onset has been gradually worsening. Pain is described as overall severity = moderate, location: poorly localized, worse with overhead movements, worse at night and other associated symptoms: pain radiating to arm/hand, interference with performing ADL's and lifestyle duties. Symptoms were incited by no known event. Patient denies fever, alcohol overuse. Therapy to date includes NSAID  His Xray showed moderate arthritis right AC joint. Review of Systems   Constitutional: Negative for chills, fever and malaise/fatigue. HENT: Negative for congestion, ear pain, sore throat and tinnitus. Eyes: Negative for blurred vision, double vision, pain and discharge. Respiratory: Negative for cough, shortness of breath and wheezing. Cardiovascular: Negative for chest pain, palpitations and leg swelling. Gastrointestinal: Negative for abdominal pain, blood in stool, constipation, diarrhea, nausea and vomiting. Genitourinary: Negative for dysuria, frequency, hematuria and urgency.    Musculoskeletal: Positive for neck pain. Negative for back pain, joint pain and myalgias. Right shoulder pain   Skin: Negative for rash. Neurological: Negative for dizziness, tremors, seizures and headaches. Endo/Heme/Allergies: Negative for polydipsia. Does not bruise/bleed easily. Psychiatric/Behavioral: Negative for depression and substance abuse. The patient is not nervous/anxious. Physical Exam   Constitutional: He is oriented to person, place, and time. He appears well-developed and well-nourished. HENT:   Head: Normocephalic and atraumatic. Right Ear: External ear normal.   Mouth/Throat: Oropharynx is clear and moist. No oropharyngeal exudate. Eyes: Conjunctivae and EOM are normal. Pupils are equal, round, and reactive to light. No scleral icterus. Neck: Neck supple. No JVD present. Spinous process tenderness and muscular tenderness present. Decreased range of motion present. No thyromegaly present. Cardiovascular: Normal rate, regular rhythm, normal heart sounds and intact distal pulses. No murmur heard. Pulses:       Carotid pulses are 2+ on the right side with bruit. Systolic murmur on aortic area   Pulmonary/Chest: Effort normal and breath sounds normal. He has no wheezes. Sternotomy scar well healed   Abdominal: Soft. Bowel sounds are normal. He exhibits no distension and no mass. Musculoskeletal: He exhibits no edema. Right shoulder: He exhibits decreased range of motion and tenderness. He exhibits no bony tenderness. Right Shoulder- Empty Can Test: Positive. Drop Arm Test: Positive. Cross-Chest Test: Positive. NEER test Positive  Ochoa' test Positive     Lymphadenopathy:     He has no cervical adenopathy. Neurological: He is alert and oriented to person, place, and time. He has normal reflexes. No cranial nerve deficit. Skin: Skin is warm and dry. No rash noted. He is not diaphoretic. Psychiatric: He has a normal mood and affect.    Nursing note and vitals reviewed. ASSESSMENT and PLAN  Diagnoses and all orders for this visit:    1. Type 2 diabetes mellitus with hyperglycemia, without long-term current use of insulin (HCC)  -      DIABETES FOOT EXAM  -     METABOLIC PANEL, COMPREHENSIVE  -     HEMOGLOBIN A1C WITH EAG  -     MICROALBUMIN, UR, RAND W/ MICROALB/CREAT RATIO    2. Hypertension, essential  -     METABOLIC PANEL, COMPREHENSIVE    3. S/P CABG x 3  -     METABOLIC PANEL, COMPREHENSIVE  -     LIPID PANEL    4. Dyslipidemia, goal LDL below 293  -     METABOLIC PANEL, COMPREHENSIVE  -     LIPID PANEL    5. Shoulder arthritis  -     REFERRAL TO PHYSICAL THERAPY    6. Osteoarthritis of spine with radiculopathy, cervical region  -     REFERRAL TO PHYSICAL THERAPY    7. Vitamin B12 deficiency  -     VITAMIN B12  -     VITAMIN B12 INJECTION  -     THER/PROPH/DIAG INJECTION, SUBCUT/IM  -     cyanocobalamin (VITAMIN B-12) 1,000 mcg/mL injection; 1 mL by IntraMUSCular route once for 1 dose. discussed referral to specialist if pain doesn't improve with PT    Discussed lifestyle issues and health guidance given  Patient was given an after visit summary which includes diagnoses, vital signs, current medications, instructions and references & authorized prescriptions . Results of labs will be conveyed to patient, once available. Pt verbalized instructions I provided and expressed understanding of discussion that was held today. Follow-up Disposition:  Return in about 3 months (around 6/7/2019) for fasting, follow up.

## 2019-03-08 ENCOUNTER — TELEPHONE (OUTPATIENT)
Dept: FAMILY MEDICINE CLINIC | Age: 69
End: 2019-03-08

## 2019-03-08 NOTE — TELEPHONE ENCOUNTER
Patient is not interested in finger stick and that was reason I sent RX for [facility].

## 2019-03-08 NOTE — TELEPHONE ENCOUNTER
Call placed to pharmacy. Patients name and  verified. She stated that insurance does not cover this device.  She gave me infomration for [employer] which covers Dex-com and Carolynn at [phone], Susie Copper that covers Janyce Copper at [phone]

## 2019-03-08 NOTE — TELEPHONE ENCOUNTER
Pharmacy is requesting for an alternative. Current medication flash glucose sensor (FREESTYLE ERIKA 14 DAY SENSOR) kit       Pharmacy message: Jose Pro is not covered by Patients insurance is there something else we can change this to? Please advise, Thank You.     Pharmacy on file verified  [facility] pharmacy [phone])

## 2019-03-11 DIAGNOSIS — M54.12 CERVICAL RADICULOPATHY: ICD-10-CM

## 2019-03-11 DIAGNOSIS — M19.019 SHOULDER ARTHRITIS: Primary | ICD-10-CM

## 2019-03-11 RX ORDER — TRAMADOL HYDROCHLORIDE 50 MG/1
50 TABLET ORAL
Qty: 20 TAB | Refills: 0 | Status: SHIPPED | OUTPATIENT
Start: 2019-03-11 | End: 2019-03-18

## 2019-03-12 ENCOUNTER — TELEPHONE (OUTPATIENT)
Dept: FAMILY MEDICINE CLINIC | Age: 69
End: 2019-03-12

## 2019-03-12 ENCOUNTER — TELEPHONE (OUTPATIENT)
Dept: CARDIOLOGY CLINIC | Age: 69
End: 2019-03-12

## 2019-03-12 ENCOUNTER — DOCUMENTATION ONLY (OUTPATIENT)
Dept: FAMILY MEDICINE CLINIC | Age: 69
End: 2019-03-12

## 2019-03-12 DIAGNOSIS — I10 ESSENTIAL HYPERTENSION: Primary | ICD-10-CM

## 2019-03-12 LAB
ALBUMIN SERPL-MCNC: 4 G/DL (ref 3.6–4.8)
ALBUMIN/GLOB SERPL: 1.6 {RATIO} (ref 1.2–2.2)
ALP SERPL-CCNC: 87 IU/L (ref 39–117)
ALT SERPL-CCNC: 33 IU/L (ref 0–44)
AST SERPL-CCNC: 27 IU/L (ref 0–40)
BILIRUB SERPL-MCNC: 0.3 MG/DL (ref 0–1.2)
BUN SERPL-MCNC: 29 MG/DL (ref 8–27)
BUN/CREAT SERPL: 21 (ref 10–24)
CALCIUM SERPL-MCNC: 9 MG/DL (ref 8.6–10.2)
CHLORIDE SERPL-SCNC: 107 MMOL/L (ref 96–106)
CHOLEST SERPL-MCNC: 164 MG/DL (ref 100–199)
CO2 SERPL-SCNC: 20 MMOL/L (ref 20–29)
CREAT SERPL-MCNC: 1.38 MG/DL (ref 0.76–1.27)
CREAT UR-MCNC: NORMAL MG/DL
EST. AVERAGE GLUCOSE BLD GHB EST-MCNC: 146 MG/DL
GLOBULIN SER CALC-MCNC: 2.5 G/DL (ref 1.5–4.5)
GLUCOSE SERPL-MCNC: 145 MG/DL (ref 65–99)
HBA1C MFR BLD: 6.7 % (ref 4.8–5.6)
HDLC SERPL-MCNC: 41 MG/DL
INTERPRETATION, 910389: NORMAL
INTERPRETATION: NORMAL
LDLC SERPL CALC-MCNC: 66 MG/DL (ref 0–99)
MICROALBUMIN UR-MCNC: NORMAL
PDF IMAGE, 910387: NORMAL
POTASSIUM SERPL-SCNC: 6.2 MMOL/L (ref 3.5–5.2)
PROT SERPL-MCNC: 6.5 G/DL (ref 6–8.5)
SODIUM SERPL-SCNC: 141 MMOL/L (ref 134–144)
TRIGL SERPL-MCNC: 287 MG/DL (ref 0–149)
VIT B12 SERPL-MCNC: 441 PG/ML (ref 232–1245)
VLDLC SERPL CALC-MCNC: 57 MG/DL (ref 5–40)

## 2019-03-12 NOTE — TELEPHONE ENCOUNTER
Patient's wife is calling because they received a recall letter for losartan. She wants to know if patient should discontinue this medication.      Phone: [phone]

## 2019-03-12 NOTE — PROGRESS NOTES
Medication Tramadol 50 mg take one tablet by mouth every 8 hours as needed for pain. Called into [employer] on file via voicemail.

## 2019-03-12 NOTE — TELEPHONE ENCOUNTER
PT wife is calling about the recall on losartan (COZAAR) 50 mg tablet would like to know if the patient should still be taking this meds or would they call in something else in replace of this.      LOV 03/11/19

## 2019-03-12 NOTE — PROGRESS NOTES
Please inform wife,  Kidney function is abnormal, likely from dehydration. Not to take Aleve or Ibuprofen as discussed, I have sent Rx for Tramadol and follow up with ortho as early as possible  hgba1c has improved ,6.7, no change in current medicines  Cholesterol results are ok except elevated Triglycerides. To watch diet strictly  B12 is better, but it was after his shot. To take B12 pills at home as advised.   thanks

## 2019-03-13 RX ORDER — OLMESARTAN MEDOXOMIL 40 MG/1
40 TABLET ORAL DAILY
Qty: 90 TAB | Refills: 1 | Status: SHIPPED | OUTPATIENT
Start: 2019-03-13 | End: 2019-04-10

## 2019-03-13 NOTE — TELEPHONE ENCOUNTER
Call placed to [employer] at [phone] inquired about coverage of Rozena Ivans through them as they are DME supplier. Amelia Sierra stated that for Medicare to cover this device there needs to be documentation in patients chart that can be provided to them of blood sugars being checked at least QID x at least 2 weeks and a minimum of 3 injections of insulin a day.

## 2019-03-13 NOTE — TELEPHONE ENCOUNTER
Verified patient with two types of identifiers. Spoke to patient's wife Sally Cha (on HIPAA form) about patient's losartan recall. Gave her Dr. Carolina Todd message about starting Benicar 40mg daily. Patient's wife verbalized understanding and will call with any other questions.

## 2019-03-13 NOTE — PROGRESS NOTES
Outbound call to patients wife Griselda Blight. Patients name and  verified. Reviewed recent labs with her. Advised of provider recommendation regarding Aleve and Ibuprofen. Advised for her to notify patient to increase fluid intake due to dehydration. Advised to preferably drink water or a Gatorade type drink to help hydrate patient. Advised to try to stay away from tea/soda as they do not replenish fluid and electrolyte balance as well. She verbalized understanding. She stated that patient had already picked up Tramadol from pharmacy.

## 2019-03-13 NOTE — TELEPHONE ENCOUNTER
Outbound call to patients wife Annamarie Jasmine who is on PHI. Patients name and  verified. Advised for wife to contact pharmacy to see if RX was under the recall as they are generally  and lot specific. She verbalized understanding. She stated she will contact the pharmacy and if it is part of the recall she will contact. She was very appreciative of return call.

## 2019-03-19 ENCOUNTER — APPOINTMENT (OUTPATIENT)
Dept: PHYSICAL THERAPY | Age: 69
End: 2019-03-19

## 2019-03-21 ENCOUNTER — DOCUMENTATION ONLY (OUTPATIENT)
Dept: FAMILY MEDICINE CLINIC | Age: 69
End: 2019-03-21

## 2019-04-10 ENCOUNTER — TELEPHONE (OUTPATIENT)
Dept: CARDIOLOGY CLINIC | Age: 69
End: 2019-04-10

## 2019-04-10 RX ORDER — CANDESARTAN 32 MG/1
32 TABLET ORAL DAILY
Qty: 30 TAB | Refills: 3 | Status: SHIPPED | OUTPATIENT
Start: 2019-04-10 | End: 2019-05-03 | Stop reason: SDUPTHER

## 2019-04-10 NOTE — TELEPHONE ENCOUNTER
Verified patient with two types of identifiers. Spoke to patient's wife (on HIPAA) about new medication. She verbalized understanding and will call with any questions or concerns.

## 2019-05-03 ENCOUNTER — TELEPHONE (OUTPATIENT)
Dept: CARDIOLOGY CLINIC | Age: 69
End: 2019-05-03

## 2019-05-03 DIAGNOSIS — I10 ESSENTIAL HYPERTENSION WITH GOAL BLOOD PRESSURE LESS THAN 130/85: ICD-10-CM

## 2019-05-03 DIAGNOSIS — I10 ESSENTIAL HYPERTENSION: Primary | ICD-10-CM

## 2019-05-03 DIAGNOSIS — Z95.1 S/P CABG X 3: ICD-10-CM

## 2019-05-03 RX ORDER — CANDESARTAN 32 MG/1
32 TABLET ORAL DAILY
Qty: 90 TAB | Refills: 3 | Status: SHIPPED | OUTPATIENT
Start: 2019-05-03 | End: 2020-04-29

## 2019-05-03 RX ORDER — HYDROCHLOROTHIAZIDE 50 MG/1
50 TABLET ORAL
Qty: 90 TAB | Refills: 3 | Status: SHIPPED | OUTPATIENT
Start: 2019-05-03 | End: 2020-04-29

## 2019-05-03 NOTE — TELEPHONE ENCOUNTER
Verified patient with two types of identifiers. Patient's wife calling to state [facility] states they never received the candesartan prescription sent to replace benicar sent back in April. Called pharmacy to confirm, they state they have no record of this. Resent per VO by MD as well as HCTZ refill. Patient's wife verbalized understanding and will call with any other questions.

## 2019-05-31 DIAGNOSIS — E78.5 DYSLIPIDEMIA: Primary | ICD-10-CM

## 2019-05-31 RX ORDER — ATORVASTATIN CALCIUM 20 MG/1
20 TABLET, FILM COATED ORAL DAILY
Qty: 90 TAB | Refills: 3 | Status: SHIPPED | OUTPATIENT
Start: 2019-05-31 | End: 2020-06-30

## 2019-05-31 NOTE — TELEPHONE ENCOUNTER
Request for atorvastatin 20mg QHS. Last office visit 1-10-19, next office visit 7-11-19.  Refills per verbal order from Dr. Palomo Toscano.

## 2019-06-18 DIAGNOSIS — I10 HYPERTENSION, ESSENTIAL: ICD-10-CM

## 2019-06-18 RX ORDER — METOPROLOL TARTRATE 50 MG/1
TABLET ORAL
Qty: 60 TAB | Refills: 2 | OUTPATIENT
Start: 2019-06-18

## 2019-06-18 RX ORDER — METOPROLOL TARTRATE 100 MG/1
100 TABLET ORAL 2 TIMES DAILY
Qty: 180 TAB | Refills: 3 | Status: SHIPPED | OUTPATIENT
Start: 2019-06-18 | End: 2020-04-14

## 2019-06-18 NOTE — TELEPHONE ENCOUNTER
Request for metoprolol tartrate 100mg BID. Last office visit 1-10-19, next office visit 7-11-19.  Refills per verbal order from Dr. Davenport .

## 2019-06-20 ENCOUNTER — DOCUMENTATION ONLY (OUTPATIENT)
Dept: FAMILY MEDICINE CLINIC | Age: 69
End: 2019-06-20

## 2019-06-20 ENCOUNTER — OFFICE VISIT (OUTPATIENT)
Dept: FAMILY MEDICINE CLINIC | Age: 69
End: 2019-06-20

## 2019-06-20 VITALS
OXYGEN SATURATION: 92 % | TEMPERATURE: 98.2 F | WEIGHT: 142 LBS | HEIGHT: 63 IN | DIASTOLIC BLOOD PRESSURE: 86 MMHG | SYSTOLIC BLOOD PRESSURE: 125 MMHG | BODY MASS INDEX: 25.16 KG/M2 | HEART RATE: 67 BPM | RESPIRATION RATE: 16 BRPM

## 2019-06-20 DIAGNOSIS — E53.8 VITAMIN B12 DEFICIENCY: ICD-10-CM

## 2019-06-20 DIAGNOSIS — I10 HYPERTENSION, ESSENTIAL: ICD-10-CM

## 2019-06-20 DIAGNOSIS — E11.65 TYPE 2 DIABETES MELLITUS WITH HYPERGLYCEMIA, WITHOUT LONG-TERM CURRENT USE OF INSULIN (HCC): Primary | ICD-10-CM

## 2019-06-20 DIAGNOSIS — E78.5 DYSLIPIDEMIA, GOAL LDL BELOW 100: ICD-10-CM

## 2019-06-20 DIAGNOSIS — Z95.1 S/P CABG X 3: ICD-10-CM

## 2019-06-20 RX ORDER — BACLOFEN 10 MG/1
TABLET ORAL
COMMUNITY
Start: 2019-03-28 | End: 2019-06-20 | Stop reason: ALTCHOICE

## 2019-06-20 RX ORDER — METOPROLOL TARTRATE 50 MG/1
TABLET ORAL
Refills: 2 | COMMUNITY
Start: 2019-05-08 | End: 2019-06-20 | Stop reason: SDUPTHER

## 2019-06-20 RX ORDER — HYDROCHLOROTHIAZIDE 25 MG/1
50 TABLET ORAL
COMMUNITY
Start: 2018-08-15 | End: 2019-06-20 | Stop reason: SDUPTHER

## 2019-06-20 RX ORDER — METOPROLOL TARTRATE 100 MG/1
100 TABLET ORAL
COMMUNITY
Start: 2019-03-01 | End: 2019-06-20 | Stop reason: SDUPTHER

## 2019-06-20 RX ORDER — BACLOFEN 10 MG/1
TABLET ORAL
Refills: 1 | COMMUNITY
Start: 2019-03-28 | End: 2019-06-20 | Stop reason: ALTCHOICE

## 2019-06-20 RX ORDER — NAPROXEN 500 MG/1
TABLET ORAL
Refills: 1 | COMMUNITY
Start: 2019-03-28 | End: 2019-06-20 | Stop reason: ALTCHOICE

## 2019-06-20 RX ORDER — TRAZODONE HYDROCHLORIDE 50 MG/1
25 TABLET ORAL
COMMUNITY
Start: 2018-08-27 | End: 2019-06-20 | Stop reason: SDUPTHER

## 2019-06-20 RX ORDER — METFORMIN HYDROCHLORIDE 1000 MG/1
TABLET ORAL
COMMUNITY
Start: 2019-03-01 | End: 2019-06-20 | Stop reason: SDUPTHER

## 2019-06-20 RX ORDER — GUAIFENESIN 100 MG/5ML
81 LIQUID (ML) ORAL
COMMUNITY
Start: 2018-07-18 | End: 2019-06-20 | Stop reason: SDUPTHER

## 2019-06-20 RX ORDER — UREA 10 %
500 LOTION (ML) TOPICAL
COMMUNITY
Start: 2018-07-18 | End: 2019-06-20 | Stop reason: SDUPTHER

## 2019-06-20 NOTE — PATIENT INSTRUCTIONS
High Cholesterol: Care Instructions  Your Care Instructions    Cholesterol is a type of fat in your blood. It is needed for many body functions, such as making new cells. Cholesterol is made by your body. It also comes from food you eat. High cholesterol means that you have too much of the fat in your blood. This raises your risk of a heart attack and stroke. LDL and HDL are part of your total cholesterol. LDL is the \"bad\" cholesterol. High LDL can raise your risk for heart disease, heart attack, and stroke. HDL is the \"good\" cholesterol. It helps clear bad cholesterol from the body. High HDL is linked with a lower risk of heart disease, heart attack, and stroke. Your cholesterol levels help your doctor find out your risk for having a heart attack or stroke. You and your doctor can talk about whether you need to lower your risk and what treatment is best for you. A heart-healthy lifestyle along with medicines can help lower your cholesterol and your risk. The way you choose to lower your risk will depend on how high your risk is for heart attack and stroke. It will also depend on how you feel about taking medicines. Follow-up care is a key part of your treatment and safety. Be sure to make and go to all appointments, and call your doctor if you are having problems. It's also a good idea to know your test results and keep a list of the medicines you take. How can you care for yourself at home? · Eat a variety of foods every day. Good choices include fruits, vegetables, whole grains (like oatmeal), dried beans and peas, nuts and seeds, soy products (like tofu), and fat-free or low-fat dairy products. · Replace butter, margarine, and hydrogenated or partially hydrogenated oils with olive and canola oils. (Canola oil margarine without trans fat is fine.)  · Replace red meat with fish, poultry, and soy protein (like tofu). · Limit processed and packaged foods like chips, crackers, and cookies.   · Bake, broil, or steam foods. Don't sloan them. · Be physically active. Get at least 30 minutes of exercise on most days of the week. Walking is a good choice. You also may want to do other activities, such as running, swimming, cycling, or playing tennis or team sports. · Stay at a healthy weight or lose weight by making the changes in eating and physical activity listed above. Losing just a small amount of weight, even 5 to 10 pounds, can reduce your risk for having a heart attack or stroke. · Do not smoke. When should you call for help? Watch closely for changes in your health, and be sure to contact your doctor if:    · You need help making lifestyle changes.     · You have questions about your medicine. Where can you learn more? Go to http://pardeep-harjit.info/. Enter R160 in the search box to learn more about \"High Cholesterol: Care Instructions. \"  Current as of: July 22, 2018  Content Version: 11.9  © 5291-4354 [employer]. Care instructions adapted under license by [employer] (which disclaims liability or warranty for this information). If you have questions about a medical condition or this instruction, always ask your healthcare professional. [address] any warranty or liability for your use of this information.

## 2019-06-20 NOTE — PROGRESS NOTES
Office notes and forms completed by provider and faxed to [facility] at [phone] for patients diabetic shoes. Confirmation received.

## 2019-06-20 NOTE — PROGRESS NOTES
HISTORY OF PRESENT ILLNESS  Chery Padilla is a 80 y.o. male. Seen for follow up on CAD,DM, HTN,dyslipidemia . Had been to ortho for his cervical radiculopathy and was treated conservatively.   Has brought forms for diabetic shoes  HPI  Cardiovascular Review  The patient has hypertension, hyperlipidemia, coronary artery disease, status post coronary artery stenting and had 2 vessel PCI on 11/11/2016, s/p CABG x 3 on 07/09/2018. Ramu Mendoza reports taking medications as instructed, no medication side effects noted, notes stable dyspnea on exertion, no change, no swelling of ankles, no orthostatic dizziness or lightheadedness, no orthopnea or paroxysmal nocturnal dyspnea, no palpitations, no intermittent claudication symptoms.  Diet and Lifestyle: generally follows a low fat low cholesterol diet, generally follows a low sodium diet, nonsmoker.  Lab review: labs reviewed and discussed with patient.  Follows Jinny  Medicines:ASA, Metoprolol 100 mg bid, Losartan 100 mg, Hctz 50 mg, Atorvastatin 20 mg     Lab Results   Component Value Date/Time    Cholesterol, total 164 03/11/2019 08:50 AM    HDL Cholesterol 41 03/11/2019 08:50 AM    LDL, calculated 66 03/11/2019 08:50 AM    VLDL, calculated 57 (H) 03/11/2019 08:50 AM    Triglyceride 287 (H) 03/11/2019 08:50 AM    CHOL/HDL Ratio 4.3 06/22/2018 10:11 AM          Endocrine Review  He is seen for diabetes.  Since last visit he reports: no polyuria or polydipsia, no chest pain, dyspnea or TIA's, no numbness, tingling or pain in extremities, no unusual visual symptoms, weight has decreased, no significant changes.  Home glucose monitoring: is performed sporadically, nonfasting values range 160-200  He is checking his sugars one per day.  He reports medication compliance: compliant all of the time.  Medication side effects: none.  Diabetic diet compliance: noncompliant some of the time.  Lab review: labs reviewed and discussed with patient.  Eye exam: UTD.    On  Glipizide 5 mg BID and Januvia 50 mg, Metformin 1 gm bid      Lab Results   Component Value Date/Time    Hemoglobin A1c 6.7 (H) 03/11/2019 08:50 AM        Review of Systems   Constitutional: Positive for malaise/fatigue. Negative for chills and fever. HENT: Negative for congestion, ear pain, sore throat and tinnitus. Eyes: Negative for blurred vision, double vision, pain and discharge. Respiratory: Negative for cough, shortness of breath and wheezing. Cardiovascular: Negative for chest pain, palpitations and leg swelling. Gastrointestinal: Negative for abdominal pain, blood in stool, constipation, diarrhea, nausea and vomiting. Genitourinary: Negative for dysuria, frequency, hematuria and urgency. Musculoskeletal: Negative for back pain, joint pain and myalgias. Skin: Negative for rash. Neurological: Negative for dizziness, tremors, seizures and headaches. Endo/Heme/Allergies: Negative for polydipsia. Does not bruise/bleed easily. Psychiatric/Behavioral: Negative for depression and substance abuse. The patient is not nervous/anxious. Physical Exam   Constitutional: He is oriented to person, place, and time. He appears well-developed and well-nourished. HENT:   Head: Normocephalic and atraumatic. Right Ear: External ear normal.   Mouth/Throat: Oropharynx is clear and moist. No oropharyngeal exudate. Eyes: Pupils are equal, round, and reactive to light. Conjunctivae and EOM are normal. No scleral icterus. Neck: Normal range of motion. Neck supple. No JVD present. No thyromegaly present. Cardiovascular: Normal rate, regular rhythm, normal heart sounds and intact distal pulses. No murmur heard. Pulses:       Dorsalis pedis pulses are 1+ on the right side, and 1+ on the left side. Posterior tibial pulses are 1+ on the right side, and 1+ on the left side. Pulmonary/Chest: Effort normal and breath sounds normal. He has no wheezes. Abdominal: Soft.  Bowel sounds are normal. He exhibits no distension and no mass. Musculoskeletal: Normal range of motion. He exhibits no edema or tenderness. Feet:DP reduced bilateral, onychomycosis, reduced sensation at both feet, tip of all toes and small callus on plantar aspect of both feet       Lymphadenopathy:     He has no cervical adenopathy. Neurological: He is alert and oriented to person, place, and time. He has normal reflexes. No cranial nerve deficit. Skin: Skin is warm and dry. No rash noted. He is not diaphoretic. Psychiatric: He has a normal mood and affect. Nursing note and vitals reviewed. ASSESSMENT and PLAN  Diagnoses and all orders for this visit:    1. Type 2 diabetes mellitus with hyperglycemia, without long-term current use of insulin ([facility])  -      DIABETES FOOT EXAM  -     METABOLIC PANEL, COMPREHENSIVE  -     HEMOGLOBIN A1C WITH EAG  -     MICROALBUMIN, UR, RAND W/ MICROALB/CREAT RATIO  -     LIPID PANEL    2. Hypertension, essential  -     METABOLIC PANEL, COMPREHENSIVE    3. Dyslipidemia, goal LDL below 725  -     METABOLIC PANEL, COMPREHENSIVE  -     LIPID PANEL    4. S/P CABG x 3  -     LIPID PANEL    5. Vitamin B12 deficiency  -     VITAMIN B12    Other orders  -     diph,pertuss,acel,,tetanus vac,PF, (ADACEL) 2 Lf-(2.5-5-3-5 mcg)-5Lf/0.5 mL syrg vaccine; 0.5 mL by IntraMUSCular route once for 1 dose.  -     varicella-zoster recombinant, PF, (SHINGRIX, PF,) 50 mcg/0.5 mL susr injection; 0.5mL by IntraMUSCular route once now and then repeat in 2-6 months      Discussed lifestyle issues and health guidance given  Patient was given an after visit summary which includes diagnoses, vital signs, current medications, instructions and references & authorized prescriptions . Results of labs will be conveyed to patient, once available. Pt verbalized instructions I provided and expressed understanding of discussion that was held today. Follow-up and Dispositions    · Return for fasting, follow up, DM, CHO.

## 2019-06-20 NOTE — PROGRESS NOTES
Chief Complaint   Patient presents with    Hypertension     Follow up, Fasting    Diabetes    Cholesterol Problem     1. Have you been to the ER, urgent care clinic since your last visit? Hospitalized since your last visit? No    2. Have you seen or consulted any other health care providers outside of the [address] [place] [address] since your last visit? Include any pap smears or colon screening.  No

## 2019-06-21 LAB
ALBUMIN SERPL-MCNC: 4.5 G/DL (ref 3.6–4.8)
ALBUMIN/CREAT UR: 156.1 MG/G CREAT (ref 0–30)
ALBUMIN/GLOB SERPL: 1.8 {RATIO} (ref 1.2–2.2)
ALP SERPL-CCNC: 84 IU/L (ref 39–117)
ALT SERPL-CCNC: 25 IU/L (ref 0–44)
AST SERPL-CCNC: 24 IU/L (ref 0–40)
BILIRUB SERPL-MCNC: 0.3 MG/DL (ref 0–1.2)
BUN SERPL-MCNC: 23 MG/DL (ref 8–27)
BUN/CREAT SERPL: 18 (ref 10–24)
CALCIUM SERPL-MCNC: 9.9 MG/DL (ref 8.6–10.2)
CHLORIDE SERPL-SCNC: 105 MMOL/L (ref 96–106)
CHOLEST SERPL-MCNC: 167 MG/DL (ref 100–199)
CO2 SERPL-SCNC: 23 MMOL/L (ref 20–29)
CREAT SERPL-MCNC: 1.25 MG/DL (ref 0.76–1.27)
CREAT UR-MCNC: 104 MG/DL
EST. AVERAGE GLUCOSE BLD GHB EST-MCNC: 126 MG/DL
GLOBULIN SER CALC-MCNC: 2.5 G/DL (ref 1.5–4.5)
GLUCOSE SERPL-MCNC: 117 MG/DL (ref 65–99)
HBA1C MFR BLD: 6 % (ref 4.8–5.6)
HDLC SERPL-MCNC: 41 MG/DL
INTERPRETATION, 910389: NORMAL
INTERPRETATION: NORMAL
LDLC SERPL CALC-MCNC: 92 MG/DL (ref 0–99)
MICROALBUMIN UR-MCNC: 162.3 UG/ML
PDF IMAGE, 910387: NORMAL
POTASSIUM SERPL-SCNC: 5.9 MMOL/L (ref 3.5–5.2)
PROT SERPL-MCNC: 7 G/DL (ref 6–8.5)
SODIUM SERPL-SCNC: 141 MMOL/L (ref 134–144)
TRIGL SERPL-MCNC: 169 MG/DL (ref 0–149)
VIT B12 SERPL-MCNC: 1106 PG/ML (ref 232–1245)
VLDLC SERPL CALC-MCNC: 34 MG/DL (ref 5–40)

## 2019-06-21 NOTE — PROGRESS NOTES
Call placed to patients wife Tracy Appiah. Patients name and  verified. reviewed recent labs with wife. She verbalized understanding.

## 2019-07-12 ENCOUNTER — TELEPHONE (OUTPATIENT)
Dept: FAMILY MEDICINE CLINIC | Age: 69
End: 2019-07-12

## 2019-07-12 NOTE — TELEPHONE ENCOUNTER
----- Message from [address] sent at 7/12/2019  1:12 PM EDT -----  Regarding: Dr. Cullen Drummond from [facility] is requesting most recent chart notes and certification of medical necessity faxed to [phone]. Request was sent on 07/09/19.  Best contact number [phone]

## 2019-09-16 ENCOUNTER — OFFICE VISIT (OUTPATIENT)
Dept: CARDIOLOGY CLINIC | Age: 69
End: 2019-09-16

## 2019-09-16 VITALS
BODY MASS INDEX: 24.8 KG/M2 | WEIGHT: 140 LBS | HEIGHT: 63 IN | DIASTOLIC BLOOD PRESSURE: 60 MMHG | RESPIRATION RATE: 16 BRPM | SYSTOLIC BLOOD PRESSURE: 120 MMHG | HEART RATE: 61 BPM | OXYGEN SATURATION: 99 %

## 2019-09-16 DIAGNOSIS — I35.0 NONRHEUMATIC AORTIC VALVE STENOSIS: ICD-10-CM

## 2019-09-16 DIAGNOSIS — E78.5 DYSLIPIDEMIA: ICD-10-CM

## 2019-09-16 DIAGNOSIS — Z78.9 STATIN INTOLERANCE: ICD-10-CM

## 2019-09-16 DIAGNOSIS — Z95.1 S/P CABG X 3: ICD-10-CM

## 2019-09-16 DIAGNOSIS — I10 HYPERTENSION, ESSENTIAL: ICD-10-CM

## 2019-09-16 DIAGNOSIS — I25.10 CORONARY ARTERY DISEASE INVOLVING NATIVE CORONARY ARTERY OF NATIVE HEART WITHOUT ANGINA PECTORIS: Primary | ICD-10-CM

## 2019-09-16 NOTE — PROGRESS NOTES
Neal Kendall     1950       Monik Clarke MD, [facility]  Date of Visit-9/16/2019   PCP is Annie Garrison MD   [facility]  [facility]  HPI:  Salome Moreno is a 80 y.o. male   One year follow up of CAD with CABG 6/9/2018. Prior NSTEMI in November 2016 and resolved pulmonary HTN. Overall the pt states he is doing well. Reports no problems. However, on occasion has muscle cramps in lower extremities, bilaterally. More persistent at night than during the day. Denies chest pain. Reports that diabetes is well-controlled on current medications. The patient drinks alcohol, one beer every other day and denies smoking. EKG 09/16/2019: NSR, LAE, and non-specific T-wave findings. Assessment/Plan:     1. Coronary artery disease involving native coronary artery of native heart without angina pectoris  CABG last year, recovered well, had abnormal stress test in June of 2018, CABG was three vessel. In 2016 had stent to the LAD and OM. - AMB POC EKG ROUTINE W/ 12 LEADS, INTER & REP    2. Hypertension, essential  Multiple meds, including diuretic, ARB, beta blocker, but has great control today at 120 and no side effects of medicines. BP Readings from Last 6 Encounters:   09/16/19 120/60   06/20/19 125/86   03/07/19 120/70   02/28/19 122/70   01/10/19 160/68   10/23/18 124/80       3. Dyslipidemia  Lipids on high potency statin as appropriate for secondary prevention. Note numbers came up a little this year, had been controlled, he says he ahs been compliant, see what next set looks like goal LDL <85  Lab Results   Component Value Date/Time    LDL, calculated 92 06/20/2019 10:25 AM      4. Nonrheumatic aortic valve stenosis  Very mild AS noted on last echo. 5. S/P CABG x 3  6. Statin intolerance  Improved. Follow up in one year.    Future Appointments   Date Time Provider Mina Delgado   10/22/2019  8:00 AM Annie Garrison MD [facility] MINAL SCHED   9/14/2020  8:40 AM Maria Luisa Ambrose  [address]      Patient Instructions   You will be scheduled for an annual follow up. Key CAD CHF Meds             metoprolol tartrate (LOPRESSOR) 100 mg IR tablet (Taking) Take 1 Tab by mouth two (2) times a day. atorvastatin (LIPITOR) 20 mg tablet (Taking) Take 1 Tab by mouth daily. candesartan (ATACAND) 32 mg tablet (Taking) Take 1 Tab by mouth daily. hydroCHLOROthiazide (HYDRODIURIL) 50 mg tablet (Taking) Take 1 Tab by mouth daily (after breakfast). aspirin 81 mg chewable tablet (Taking) Take 1 Tab by mouth daily. Impression:   1. Coronary artery disease involving native coronary artery of native heart without angina pectoris    2. Hypertension, essential    3. Dyslipidemia    4. Nonrheumatic aortic valve stenosis    5. S/P CABG x 3    6. Statin intolerance       Cardiac History:   Echo 1-15-18 EF 63% 63 %. Mild LAE, MAC, mild AS mean 8, HUY 1.8 mild TR  CABG x 3 7-9-18= LIMA to LAD, SVG-OM, SVG-dRCA   Echo 6-22-18 EF 55-60%, mod MAC, aortic sclerosis without stenosis , mild TR  LISA 6-18-18 4 minutes +moderate hypokinesis of the mid anteroseptal, entire inferior, apical septal, and apical wall(s). With exercise a mild reduction in LV function. PCI 11-11-16 JEREMY to mLAD 95% JEREMY to OM1 90%     ROS-except as noted above. . A complete cardiac and respiratory are reviewed and negative except as above ; Resp-denies wheezing  or productive cough,.  Const- No unusual weight loss or fever; Neuro-no recent seizure or CVA ; GI- No BRBPR, abdom pain, bloating ; - no  hematuria   see supplement sheet, initialed and to be scanned by staff  Past Medical History:   Diagnosis Date    CAD (coronary artery disease) 11/10/2016    NSTEMI & 2 stents    Deafness 10/28/2012    DM [facility]diabetes mellitus) [facility]     Elevated cholesterol     Hypertension     NSTEMI (non-ST elevated myocardial infarction) ([facility] 11/10/2016      Social Hx= reports that he has never smoked. He has never used smokeless tobacco. He reports that he drinks about 2.0 standard drinks of alcohol per week. He reports that he does not use drugs. Exam and Labs:    Visit Vitals  /60 (BP 1 Location: Left arm, BP Patient Position: Sitting)   Pulse 61   Resp 16   Ht 5' 3\" (1.6 m)   Wt 140 lb (63.5 kg)   SpO2 99%   BMI 24.80 kg/m²    @Constitutional:  NAD, comfortable  Head: NC,AT. Eyes: No scleral icterus. Neck:  Neck supple. No JVD present. Throat: moist mucous membranes. Chest: Effort normal & normal respiratory excursion . Neurological: alert, conversant and oriented . Skin: Skin is not cold. No obvious systemic rash noted. Not diaphoretic. No erythema. Psychiatric:  Grossly normal mood and affect. Behavior appears normal. Extremities:  no clubbing or cyanosis. Abdomen: non distended    Lungs:breath sounds normal. No stridor. distress, wheezes or  Rales. Heart:   normal rate, regular rhythm, normal S1, S2,  1/6 ANDREA, rubs, clicks or gallops , PMI non displaced. Edema: Edema is none.   Lab Results   Component Value Date/Time    Cholesterol, total 167 06/20/2019 10:25 AM    HDL Cholesterol 41 06/20/2019 10:25 AM    LDL, calculated 92 06/20/2019 10:25 AM    Triglyceride 169 (H) 06/20/2019 10:25 AM    CHOL/HDL Ratio 4.3 06/22/2018 10:11 AM     Lab Results   Component Value Date/Time    Sodium 141 06/20/2019 10:25 AM    Potassium 5.9 (H) 06/20/2019 10:25 AM    Chloride 105 06/20/2019 10:25 AM    CO2 23 06/20/2019 10:25 AM    Anion gap 7 07/18/2018 04:22 AM    Glucose 117 (H) 06/20/2019 10:25 AM    BUN 23 06/20/2019 10:25 AM    Creatinine 1.25 06/20/2019 10:25 AM    BUN/Creatinine ratio 18 06/20/2019 10:25 AM    GFR est AA 68 06/20/2019 10:25 AM    GFR est non-AA 59 (L) 06/20/2019 10:25 AM    Calcium 9.9 06/20/2019 10:25 AM      Wt Readings from Last 3 Encounters:   09/16/19 140 lb (63.5 kg)   06/20/19 142 lb (64.4 kg)   03/07/19 140 lb (63.5 kg)      BP Readings from Last 3 Encounters:   09/16/19 120/60   06/20/19 125/86   03/07/19 120/70      Current Outpatient Medications   Medication Sig    metoprolol tartrate (LOPRESSOR) 100 mg IR tablet Take 1 Tab by mouth two (2) times a day.  atorvastatin (LIPITOR) 20 mg tablet Take 1 Tab by mouth daily.  candesartan (ATACAND) 32 mg tablet Take 1 Tab by mouth daily.  hydroCHLOROthiazide (HYDRODIURIL) 50 mg tablet Take 1 Tab by mouth daily (after breakfast).  JANUVIA 50 mg tablet TAKE 1 TABLET BY MOUTH ONCE DAILY    metFORMIN (GLUCOPHAGE) 1,000 mg tablet TAKE 1 TABLET BY MOUTH TWICE DAILY WITH MEALS    flash glucose sensor (FREESTYLE ERIKA 14 DAY SENSOR) kit Check sugar one time daily    flash glucose scanning reader (FREESTYLE ERIKA 14 DAY READER) misc Check sugar one time daily, in morning,range     traZODone (DESYREL) 50 mg tablet Take 0.5 Tabs by mouth nightly.  glipiZIDE (GLUCOTROL) 10 mg tablet Take 0.5 Tabs by mouth two (2) times a day. Different dose than what you were taking    aspirin 81 mg chewable tablet Take 1 Tab by mouth daily.  cyanocobalamin (VITAMIN B12) 500 mcg tablet Take 1 Tab by mouth daily. No current facility-administered medications for this visit. Impression see above.        Transcribed by Maddie Oleary, as dictated by Priscilla Ruano MD.

## 2019-09-16 NOTE — PROGRESS NOTES
Visit Vitals  /60 (BP 1 Location: Left arm, BP Patient Position: Sitting)   Pulse 61   Resp 16   Ht 5' 3\" (1.6 m)   Wt 140 lb (63.5 kg)   SpO2 99%   BMI 24.80 kg/m²

## 2019-09-16 NOTE — Clinical Note
9/16/19 Patient: Anjum Baumann YOB: 1950 Date of Visit: 9/16/2019 Dayami Rea MD 
[address] [facility] [address] [address] VIA In Basket Dear Dayamigarcía Rea MD, Thank you for referring Mr. Anjum Baumann to [address] [place] [address] for evaluation. My notes for this consultation are attached. If you have questions, please do not hesitate to call me. I look forward to following your patient along with you.  
 
 
Sincerely, 
 
Kamilla Estevez MD

## 2019-09-21 DIAGNOSIS — E11.65 TYPE 2 DIABETES MELLITUS WITH HYPERGLYCEMIA, WITHOUT LONG-TERM CURRENT USE OF INSULIN (HCC): ICD-10-CM

## 2019-09-21 RX ORDER — SITAGLIPTIN 50 MG/1
TABLET, FILM COATED ORAL
Qty: 90 TAB | Refills: 1 | Status: SHIPPED | OUTPATIENT
Start: 2019-09-21 | End: 2019-10-22 | Stop reason: SDUPTHER

## 2019-09-24 RX ORDER — TRAZODONE HYDROCHLORIDE 50 MG/1
TABLET ORAL
Qty: 45 TAB | Refills: 1 | Status: SHIPPED | OUTPATIENT
Start: 2019-09-24 | End: 2021-08-19

## 2019-09-24 RX ORDER — TRAZODONE HYDROCHLORIDE 50 MG/1
TABLET ORAL
Qty: 45 TAB | Refills: 1 | Status: SHIPPED | OUTPATIENT
Start: 2019-09-24 | End: 2019-09-24 | Stop reason: SDUPTHER

## 2019-09-25 PROBLEM — Z01.818 PREOP GENERAL PHYSICAL EXAM: Status: RESOLVED | Noted: 2017-01-05 | Resolved: 2019-09-25

## 2019-10-22 ENCOUNTER — OFFICE VISIT (OUTPATIENT)
Dept: FAMILY MEDICINE CLINIC | Age: 69
End: 2019-10-22

## 2019-10-22 VITALS
SYSTOLIC BLOOD PRESSURE: 110 MMHG | OXYGEN SATURATION: 98 % | DIASTOLIC BLOOD PRESSURE: 65 MMHG | BODY MASS INDEX: 24.98 KG/M2 | HEIGHT: 63 IN | RESPIRATION RATE: 16 BRPM | TEMPERATURE: 98.3 F | HEART RATE: 56 BPM | WEIGHT: 141 LBS

## 2019-10-22 DIAGNOSIS — I10 HYPERTENSION, ESSENTIAL: ICD-10-CM

## 2019-10-22 DIAGNOSIS — Z23 ENCOUNTER FOR IMMUNIZATION: ICD-10-CM

## 2019-10-22 DIAGNOSIS — E11.65 TYPE 2 DIABETES MELLITUS WITH HYPERGLYCEMIA, WITHOUT LONG-TERM CURRENT USE OF INSULIN (HCC): ICD-10-CM

## 2019-10-22 DIAGNOSIS — Z95.1 S/P CABG X 3: ICD-10-CM

## 2019-10-22 DIAGNOSIS — E78.5 DYSLIPIDEMIA, GOAL LDL BELOW 100: ICD-10-CM

## 2019-10-22 DIAGNOSIS — J30.0 VASOMOTOR RHINITIS: ICD-10-CM

## 2019-10-22 DIAGNOSIS — Z01.818 PREOPERATIVE GENERAL PHYSICAL EXAMINATION: Primary | ICD-10-CM

## 2019-10-22 RX ORDER — METFORMIN HYDROCHLORIDE 1000 MG/1
TABLET ORAL
COMMUNITY
Start: 2019-03-01 | End: 2019-10-22 | Stop reason: SDUPTHER

## 2019-10-22 RX ORDER — BACLOFEN 10 MG/1
TABLET ORAL
COMMUNITY
Start: 2019-03-28 | End: 2019-10-22 | Stop reason: ALTCHOICE

## 2019-10-22 RX ORDER — OLOPATADINE HYDROCHLORIDE 665 UG/1
2 SPRAY NASAL 2 TIMES DAILY
Qty: 1 BOTTLE | Refills: 1 | Status: SHIPPED | OUTPATIENT
Start: 2019-10-22 | End: 2019-11-21 | Stop reason: ALTCHOICE

## 2019-10-22 NOTE — PROGRESS NOTES
Chief Complaint   Patient presents with    Diabetes     Follow up, Fasting    Cholesterol Problem     1. Have you been to the ER, urgent care clinic since your last visit? Hospitalized since your last visit? No    2. Have you seen or consulted any other health care providers outside of the [address] [place] [address] since your last visit? Include any pap smears or colon screening.  No

## 2019-10-22 NOTE — PROGRESS NOTES
HISTORY OF PRESENT ILLNESS  Raghav Carcamo is a 77 y.o. male. seen for follow up on DM,dyslipiemia, HTn, CAD. Also he has brought pre op clearance  Form to get completed for his upcoming eye surgery.   His nose is still dripping , he has been to ENT and no difference  HPI  Cardiovascular Review  The patient has hypertension, hyperlipidemia, coronary artery disease, status post coronary artery stenting and had 2 vessel PCI on 11/11/2016, s/p CABG x 3 on 07/09/2018. Tg Alarcon reports taking medications as instructed, no medication side effects noted, notes stable dyspnea on exertion, no change, no swelling of ankles, no orthostatic dizziness or lightheadedness, no orthopnea or paroxysmal nocturnal dyspnea, no palpitations, no intermittent claudication symptoms.  Diet and Lifestyle: generally follows a low fat low cholesterol diet, generally follows a low sodium diet, nonsmoker.  Lab review: labs reviewed and discussed with patient.  Follows [facility]  Medicines:ASA, Metoprolol 100 mg bid, Losartan 100 mg, Hctz 50 mg, Atorvastatin 20 mg     Lab Results   Component Value Date/Time    Cholesterol, total 167 06/20/2019 10:25 AM    HDL Cholesterol 41 06/20/2019 10:25 AM    LDL, calculated 92 06/20/2019 10:25 AM    VLDL, calculated 34 06/20/2019 10:25 AM    Triglyceride 169 (H) 06/20/2019 10:25 AM    CHOL/HDL Ratio 4.3 06/22/2018 10:11 AM          Endocrine Review  He is seen for diabetes.  Since last visit he reports: no polyuria or polydipsia, no chest pain, dyspnea or TIA's, no numbness, tingling or pain in extremities, no unusual visual symptoms, weight has decreased, no significant changes.  Home glucose monitoring: is performed sporadically, nonfasting values range 160-200  He is checking his sugars one per day.  He reports medication compliance: compliant all of the time.  Medication side effects: none.  Diabetic diet compliance: noncompliant some of the time.  Lab review: labs reviewed and discussed with patient. Malou Garcia exam: UTD.    On  Glipizide 10 mg BID and Januvia 50 mg, Metformin 1 gm bid  HPI:  Tito Lopez is 77 y.o. male (1950) who presents for preoperative evaluation. Procedure/Surgery: removal of cataract and implantation of IOL   Date of Procedure/Surgery: 11/07/2019  Surgeon: Dr Kendra Sargent: [facility]  Primary Physician: Abhishek Lunsford MD       Reason for surgery: worsening cataract    Latex Allergy: no  Anesthesia Complications: None  History of abnormal bleeding : None  History of Blood Transfusions: no  Health Care Directive or Living Will: no  Recent use of: ASA  Tetanus up to date: last tetanus booster within 10 years      EKG: EKG FINDINGS - unchanged from previous tracings, normal sinus rhythm     Labs:   Lab Results   Component Value Date/Time    Hemoglobin A1c 6.0 (H) 06/20/2019 10:25 AM          After reviewing the patient's history & exam he is low risk for cardiac complications. No contraindications to planned surgery     ---------------------------------------     Prior to Admission medications    Medication Sig Start Date End Date Taking? Authorizing Provider   SITagliptin (JANUVIA) 50 mg tablet TAKE 1 TABLET BY MOUTH ONCE DAILY 3/1/19  Yes Provider, Historical   olopatadine (PATANASE) 0.6 % spry 2 Squirts by Both Nostrils route two (2) times a day. 10/22/19  Yes Abhishek Lunsford MD   traZODone (DESYREL) 50 mg tablet TAKE 1/2 (ONE-HALF) TABLET BY MOUTH NIGHTLY 9/24/19  Yes Abhishek Lunsford MD   metoprolol tartrate (LOPRESSOR) 100 mg IR tablet Take 1 Tab by mouth two (2) times a day. 6/18/19  Yes Mark Masterson MD   atorvastatin (LIPITOR) 20 mg tablet Take 1 Tab by mouth daily. 5/31/19  Yes Mark Masterson MD   candesartan (ATACAND) 32 mg tablet Take 1 Tab by mouth daily. 5/3/19  Yes Mark Masterson MD   hydroCHLOROthiazide (HYDRODIURIL) 50 mg tablet Take 1 Tab by mouth daily (after breakfast).  5/3/19  Yes Mark Masterson MD   metFORMIN (GLUCOPHAGE) 1,000 mg tablet TAKE 1 TABLET BY MOUTH TWICE DAILY WITH MEALS 3/1/19  Yes Brie Miller MD   aspirin 81 mg chewable tablet Take 1 Tab by mouth daily. 7/18/18  Yes Yoly CASANOVA NP   cyanocobalamin (VITAMIN B12) 500 mcg tablet Take 1 Tab by mouth daily. 7/18/18  Yes Cecilio Grimes NP          No Known Allergies     Patient Active Problem List    Diagnosis Date Noted    Hypercholesteremia 07/30/2017     Priority: 2 - Two    Statin intolerance 07/30/2017     Priority: 5 - Five    S/P CABG x 3 07/09/2018    Coronary artery disease involving native coronary artery of native heart without angina pectoris 07/30/2017    Hypertension, essential 07/30/2017    Colon cancer screening 05/30/2017    Nonrheumatic aortic valve stenosis 09/21/2016    Bruit of right carotid artery 09/21/2016    Type 2 diabetes mellitus with hyperglycemia ([facility] [address] 05/26/2015    Deafness 10/28/2012    Cramp of limb 03/26/2012        Past Medical History:   Diagnosis Date    CAD (coronary artery disease) 11/10/2016    NSTEMI & 2 stents    Deafness 10/28/2012    DM (diabetes mellitus) ([facility] [address]     Elevated cholesterol     Hypertension     NSTEMI (non-ST elevated myocardial infarction) ([facility] [address] 11/10/2016       Past Surgical History:   Procedure Laterality Date    CARDIAC SURG PROCEDURE UNLIST  11/11/2016    2 stents    COLONOSCOPY N/A 6/28/2018    COL[facility] ENDOSCOPY    HX APPENDECTOMY         Social History     Tobacco Use    Smoking status: Never Smoker    Smokeless tobacco: Never Used   Substance Use Topics    Alcohol use: Yes     Alcohol/week: 2.0 standard drinks     Types: 1 Cans of beer, 1 Shots of liquor per week     Comment: 1 DRINK DAILY         --------------------------------------    Review of Systems   Constitutional: Negative for chills, fever and malaise/fatigue. HENT: Negative for congestion, ear pain, sore throat and tinnitus.          Clear nasal discharge, non stop   Eyes: Negative for blurred vision, double vision, pain and discharge. Respiratory: Negative for cough, shortness of breath and wheezing. Cardiovascular: Negative for chest pain, palpitations and leg swelling. Gastrointestinal: Negative for abdominal pain, blood in stool, constipation, diarrhea, nausea and vomiting. Genitourinary: Negative for dysuria, frequency, hematuria and urgency. Musculoskeletal: Negative for back pain, joint pain and myalgias. Skin: Negative for rash. Neurological: Negative for dizziness, tremors, seizures and headaches. Endo/Heme/Allergies: Negative for polydipsia. Does not bruise/bleed easily. Psychiatric/Behavioral: Negative for depression and substance abuse. The patient is not nervous/anxious. Physical Exam   Constitutional: He is oriented to person, place, and time. He appears well-developed and well-nourished. HENT:   Head: Normocephalic and atraumatic. Right Ear: External ear normal.   Mouth/Throat: Oropharynx is clear and moist. No oropharyngeal exudate. Boggy erythematous nasal mucosa   Eyes: Pupils are equal, round, and reactive to light. Conjunctivae and EOM are normal. No scleral icterus. Neck: Normal range of motion. Neck supple. No JVD present. No thyromegaly present. Cardiovascular: Normal rate, regular rhythm, normal heart sounds and intact distal pulses. No murmur heard. Pulmonary/Chest: Effort normal and breath sounds normal. He has no wheezes. Abdominal: Soft. Bowel sounds are normal. He exhibits no distension and no mass. Musculoskeletal: Normal range of motion. He exhibits no edema or tenderness. Lymphadenopathy:     He has no cervical adenopathy. Neurological: He is alert and oriented to person, place, and time. He has normal reflexes. No cranial nerve deficit. Skin: Skin is warm and dry. No rash noted. He is not diaphoretic. Psychiatric: He has a normal mood and affect.    Nursing note and vitals reviewed. ASSESSMENT and PLAN  Diagnoses and all orders for this visit:    1. Preoperative general physical examination    2. Type 2 diabetes mellitus with hyperglycemia, without long-term current use of insulin (HCC)  -     METABOLIC PANEL, COMPREHENSIVE  -     HEMOGLOBIN A1C WITH EAG    3. Encounter for immunization  -     ADMIN INFLUENZA VIRUS VAC  -     ADMIN PNEUMOCOCCAL VACCINE  -     PNEUMOCOCCAL POLYSACCHARIDE VACCINE, 23-VALENT, ADULT OR IMMUNOSUPPRESSED PT DOSE,  -     INFLUENZA VACCINE INACTIVATED (IIV), SUBUNIT, ADJUVANTED, IM    4. Hypertension, essential  -     METABOLIC PANEL, COMPREHENSIVE    5. Dyslipidemia, goal LDL below 311  -     METABOLIC PANEL, COMPREHENSIVE  -     LIPID PANEL    6. S/P CABG x 3  -     METABOLIC PANEL, COMPREHENSIVE  -     LIPID PANEL    7. Vasomotor rhinitis  -     olopatadine (PATANASE) 0.6 % spry; 2 Squirts by Both Nostrils route two (2) times a day. Form was completed ,clearing him for upcoming cataract surgery  Discussed lifestyle issues and health guidance given  Patient was given an after visit summary which includes diagnoses, vital signs, current medications, instructions and references & authorized prescriptions . Results of labs will be conveyed to patient, once available. Pt verbalized instructions I provided and expressed understanding of discussion that was held today. Follow-up and Dispositions    · Return in about 1 month (around 11/22/2019) for medicare wellness.

## 2019-10-22 NOTE — PATIENT INSTRUCTIONS
Learning About How to Prepare for Surgery  How can you prepare before surgery? You can do some things that will help you safely prepare for surgery. · Understand exactly what surgery is planned. You should know the risks, benefits, and other options. · Tell your doctors ALL the medicines, vitamins, supplements, and herbal remedies you take. Some of these can increase the risk of bleeding. Or they may interact with anesthesia. · Follow your doctor's instructions about which medicines to take or stop before your surgery. ? You may need to stop taking some medicines a week or more before surgery. ? If you take aspirin or some other blood thinner, be sure to talk to your doctor. · Follow any other instructions your doctor gave you. · If you have an advance directive, let your doctor know, and bring a copy to the hospital.   It may include a living will and a durable power of  for health care. It lets your doctor and loved ones know your health care wishes. If you don't have one, you may want to prepare one. How can you prepare on the day of surgery? Here are some tips about what to do at home before you leave for your surgery. · If your doctor told you to take your medicines on the day of surgery, take them with only a sip of water. · Follow the instructions about when to stop eating and drinking. If you don't, your surgery may be canceled. · Follow your doctor's instructions about when to bathe or shower before your surgery. · Do not shave the surgical site yourself. · Take off all jewelry and piercings. · Take out contact lenses, if you wear them. · Have a picture ID ready to take with you. Your ID will be checked before your surgery. · Know when to call your doctor. Call your doctor if you:  ? Become ill before surgery. ? Need to reschedule. ? Have changed your mind about having the surgery. What happens before surgery?   Here are some things you can expect to happen before your surgery. · Your picture ID will be checked. · The area of your body that needs surgery is often marked to make sure there are no errors. · You will be kept comfortable and safe by your anesthesia provider. The anesthesia may make you sleep. Or it may just numb the area being worked on. What happens when you are ready to go home? Be sure you have someone drive you home. Anesthesia and pain medicine make it unsafe for you to drive. You will get instructions about recovering from your surgery. This is called a discharge plan. It will cover things like diet, wound care, follow-up care, driving, and getting back to your normal routine. Follow-up care is a key part of your treatment and safety. Be sure to make and go to all appointments, and call your doctor if you are having problems. It's also a good idea to know your test results and keep a list of the medicines you take. Where can you learn more? Go to http://pardeep-harjit.info/. Enter Q270 in the search box to learn more about \"Learning About How to Prepare for Surgery. \"  Current as of: December 13, 2018  Content Version: 12.2  © 9804-2742 [employer]. Care instructions adapted under license by [employer] (which disclaims liability or warranty for this information). If you have questions about a medical condition or this instruction, always ask your healthcare professional. [address] any warranty or liability for your use of this information.

## 2019-10-23 LAB
ALBUMIN SERPL-MCNC: 4.4 G/DL (ref 3.6–4.8)
ALBUMIN/GLOB SERPL: 1.6 {RATIO} (ref 1.2–2.2)
ALP SERPL-CCNC: 74 IU/L (ref 39–117)
ALT SERPL-CCNC: 23 IU/L (ref 0–44)
AST SERPL-CCNC: 25 IU/L (ref 0–40)
BILIRUB SERPL-MCNC: 0.4 MG/DL (ref 0–1.2)
BUN SERPL-MCNC: 16 MG/DL (ref 8–27)
BUN/CREAT SERPL: 14 (ref 10–24)
CALCIUM SERPL-MCNC: 9.8 MG/DL (ref 8.6–10.2)
CHLORIDE SERPL-SCNC: 106 MMOL/L (ref 96–106)
CHOLEST SERPL-MCNC: 158 MG/DL (ref 100–199)
CO2 SERPL-SCNC: 22 MMOL/L (ref 20–29)
CREAT SERPL-MCNC: 1.17 MG/DL (ref 0.76–1.27)
EST. AVERAGE GLUCOSE BLD GHB EST-MCNC: 128 MG/DL
GLOBULIN SER CALC-MCNC: 2.7 G/DL (ref 1.5–4.5)
GLUCOSE SERPL-MCNC: 86 MG/DL (ref 65–99)
HBA1C MFR BLD: 6.1 % (ref 4.8–5.6)
HDLC SERPL-MCNC: 43 MG/DL
INTERPRETATION, 910389: NORMAL
LDLC SERPL CALC-MCNC: 81 MG/DL (ref 0–99)
POTASSIUM SERPL-SCNC: 5.7 MMOL/L (ref 3.5–5.2)
PROT SERPL-MCNC: 7.1 G/DL (ref 6–8.5)
SODIUM SERPL-SCNC: 143 MMOL/L (ref 134–144)
TRIGL SERPL-MCNC: 171 MG/DL (ref 0–149)
VLDLC SERPL CALC-MCNC: 34 MG/DL (ref 5–40)

## 2019-10-23 NOTE — PROGRESS NOTES
Please inform wife,  Kidney and liver function normal  Average sugar very stable at 6.1 and also cholesterol results are reassuring  No change in current medicines  thanks

## 2019-10-24 ENCOUNTER — TELEPHONE (OUTPATIENT)
Dept: FAMILY MEDICINE CLINIC | Age: 69
End: 2019-10-24

## 2019-10-24 DIAGNOSIS — J30.0 VASOMOTOR RHINITIS: Primary | ICD-10-CM

## 2019-10-24 RX ORDER — IPRATROPIUM BROMIDE 42 UG/1
2 SPRAY, METERED NASAL 3 TIMES DAILY
Qty: 15 ML | Refills: 0 | Status: SHIPPED | OUTPATIENT
Start: 2019-10-24 | End: 2020-06-30 | Stop reason: ALTCHOICE

## 2019-10-24 NOTE — PROGRESS NOTES
Outbound call to patients wife Yesenia Galloway who is on PHI. Patients name and  verified. reviewed recent labs with wife.  She verbalized understanding

## 2019-10-24 NOTE — TELEPHONE ENCOUNTER
Mary Jo Biswas is calling stating that the patient insurance will not cover olopatadine (PATANASE) 0.6 % [facility] states that the insurance has two alternatives that they will cover, or a prior authorization can be done for the medication.       [employer] [employer]#  [phone]      Prior Auth# [id number]      LOV:  Tuesday, October 22, 2019

## 2019-11-12 ENCOUNTER — TELEPHONE (OUTPATIENT)
Dept: FAMILY MEDICINE CLINIC | Age: 69
End: 2019-11-12

## 2019-11-12 RX ORDER — GLIPIZIDE 10 MG/1
10 TABLET ORAL 2 TIMES DAILY
Qty: 60 TAB | Refills: 2 | Status: SHIPPED | OUTPATIENT
Start: 2019-11-12 | End: 2020-02-19

## 2019-11-12 NOTE — TELEPHONE ENCOUNTER
Mary Beth Varela is calling from [facility] pharm requesting a refill on the following meds that originally was prescribed by dr. Jovana Elizalde. Pt. Is completely out of meds and needs a refill ASAP.      Glucotrol 10 mg     Best contact# [phone]

## 2019-11-21 ENCOUNTER — OFFICE VISIT (OUTPATIENT)
Dept: FAMILY MEDICINE CLINIC | Age: 69
End: 2019-11-21

## 2019-11-21 VITALS
WEIGHT: 141 LBS | HEIGHT: 63 IN | RESPIRATION RATE: 18 BRPM | OXYGEN SATURATION: 96 % | SYSTOLIC BLOOD PRESSURE: 118 MMHG | TEMPERATURE: 98.3 F | DIASTOLIC BLOOD PRESSURE: 62 MMHG | BODY MASS INDEX: 24.98 KG/M2 | HEART RATE: 71 BPM

## 2019-11-21 DIAGNOSIS — Z13.31 SCREENING FOR DEPRESSION: ICD-10-CM

## 2019-11-21 DIAGNOSIS — Z13.39 SCREENING FOR ALCOHOLISM: ICD-10-CM

## 2019-11-21 DIAGNOSIS — Z12.5 SPECIAL SCREENING FOR MALIGNANT NEOPLASM OF PROSTATE: ICD-10-CM

## 2019-11-21 DIAGNOSIS — Z00.00 MEDICARE ANNUAL WELLNESS VISIT, SUBSEQUENT: Primary | ICD-10-CM

## 2019-11-21 DIAGNOSIS — Z71.89 ADVANCED DIRECTIVES, COUNSELING/DISCUSSION: ICD-10-CM

## 2019-11-21 RX ORDER — PREDNISOLONE ACETATE 10 MG/ML
SUSPENSION/ DROPS OPHTHALMIC
Refills: 0 | COMMUNITY
Start: 2019-10-31 | End: 2021-01-20 | Stop reason: ALTCHOICE

## 2019-11-21 RX ORDER — OFLOXACIN 3 MG/ML
SOLUTION/ DROPS OPHTHALMIC
Refills: 0 | COMMUNITY
Start: 2019-11-01 | End: 2020-04-17 | Stop reason: ALTCHOICE

## 2019-11-21 NOTE — PATIENT INSTRUCTIONS
Medicare Wellness Visit, Male    The best way to live healthy is to have a lifestyle where you eat a well-balanced diet, exercise regularly, limit alcohol use, and quit all forms of tobacco/nicotine, if applicable. Regular preventive services are another way to keep healthy. Preventive services (vaccines, screening tests, monitoring & exams) can help personalize your care plan, which helps you manage your own care. Screening tests can find health problems at the earliest stages, when they are easiest to treat. [facility] follows the current, evidence-based guidelines published by the [facility] ([facility]) when recommending preventive services for our patients. Because we follow these guidelines, sometimes recommendations change over time as research supports it. (For example, a prostate screening blood test is no longer routinely recommended for men with no symptoms). Of course, you and your doctor may decide to screen more often for some diseases, based on your risk and co-morbidities (chronic disease you are already diagnosed with). Preventive services for you include:  - Medicare offers their members a free annual wellness visit, which is time for you and your primary care provider to discuss and plan for your preventive service needs. Take advantage of this benefit every year!  -All adults over age 72 should receive the recommended pneumonia vaccines. Current USPSTF guidelines recommend a series of two vaccines for the best pneumonia protection.   -All adults should have a flu vaccine yearly and tetanus vaccine every 10 years.  -All adults age 48 and older should receive the shingles vaccines (series of two vaccines).        -All adults age 38-68 who are overweight should have a diabetes screening test once every three years.   -Other screening tests & preventive services for persons with diabetes include: an eye exam to screen for diabetic retinopathy, a kidney function test, a foot exam, and stricter control over your cholesterol.   -Cardiovascular screening for adults with routine risk involves an electrocardiogram (ECG) at intervals determined by the provider.   -Colorectal cancer screening should be done for adults age 54-65 with no increased risk factors for colorectal cancer. There are a number of acceptable methods of screening for this type of cancer. Each test has its own benefits and drawbacks. Discuss with your provider what is most appropriate for you during your annual wellness visit. The different tests include: colonoscopy (considered the best screening method), a fecal occult blood test, a fecal DNA test, and sigmoidoscopy.  -All adults born between [facility] should be screened once for Hepatitis C.  -An Abdominal Aortic Aneurysm (AAA) Screening is recommended for men age 73-68 who has ever smoked in their lifetime. Here is a list of your current Health Maintenance items (your personalized list of preventive services) with a due date:  Health Maintenance Due   Topic Date Due    Annual Well Visit  10/24/2019          Learning About Living Lisset Ovens  What is a living will? A living will is a legal form you use to write down the kind of care you want at the end of your life. It is used by the health professionals who will treat you if you aren't able to decide for yourself. If you put your wishes in writing, your loved ones and others will know what kind of care you want. They won't need to guess. This can ease your mind and be helpful to others. A living will is not the same as an estate or property will. An estate will explains what you want to happen with your money and property after you die. Is a living will a legal document? A living will is a legal document. Each state has its own laws about living mireles. If you move to another state, make sure that your living will is legal in the state where you now live.  Or you might use a universal form that has been approved by many states. This kind of form can sometimes be completed and stored online. Your electronic copy will then be available wherever you have a connection to the Internet. In most cases, doctors will respect your wishes even if you have a form from a different state. · You don't need an  to complete a living will. But legal advice can be helpful if your state's laws are unclear, your health history is complicated, or your family can't agree on what should be in your living will. · You can change your living will at any time. Some people find that their wishes about end-of-life care change as their health changes. · In addition to making a living will, think about completing a medical power of  form. This form lets you name the person you want to make end-of-life treatment decisions for you (your \"health care agent\") if you're not able to. Many hospitals and nursing homes will give you the forms you need to complete a living will and a medical power of . · Your living will is used only if you can't make or communicate decisions for yourself anymore. If you become able to make decisions again, you can accept or refuse any treatment, no matter what you wrote in your living will. · Your state may offer an online registry. This is a place where you can store your living will online so the doctors and nurses who need to treat you can find it right away. What should you think about when creating a living will? Talk about your end-of-life wishes with your family members and your doctor. Let them know what you want. That way the people making decisions for you won't be surprised by your choices. Think about these questions as you make your living will:  · Do you know enough about life support methods that might be used?  If not, talk to your doctor so you know what might be done if you can't breathe on your own, your heart stops, or you're unable to swallow. · What things would you still want to be able to do after you receive life-support methods? Would you want to be able to walk? To speak? To eat on your own? To live without the help of machines? · If you have a choice, where do you want to be cared for? In your home? At a hospital or nursing home? · Do you want certain Hinduism practices performed if you become very ill? · If you have a choice at the end of your life, where would you prefer to die? At home? In a hospital or nursing home? Somewhere else? · Would you prefer to be buried or cremated? · Do you want your organs to be donated after you die? What should you do with your living will? · Make sure that your family members and your health care agent have copies of your living will. · Give your doctor a copy of your living will to keep in your medical record. If you have more than one doctor, make sure that each one has a copy. · You may want to put a copy of your living will where it can be easily found. Where can you learn more? Go to http://pardeepMaritime Broadbandharjit.info/. Enter H426 in the search box to learn more about \"Learning About Living [place]. \"  Current as of: August 8, 2016  Content Version: 11.3  © 0178-5932 [employer]. Care instructions adapted under license by [employer] (which disclaims liability or warranty for this information). If you have questions about a medical condition or this instruction, always ask your healthcare professional. [place] [address] any warranty or liability for your use of this information. Preventing Falls: Care Instructions  Your Care Instructions    Getting around your home safely can be a challenge if you have injuries or health problems that make it easy for you to fall. Loose rugs and furniture in walkways are among the dangers for many older people who have problems walking or who have poor eyesight.  People who have conditions such as arthritis, osteoporosis, or dementia also have to be careful not to fall. You can make your home safer with a few simple measures. Follow-up care is a key part of your treatment and safety. Be sure to make and go to all appointments, and call your doctor if you are having problems. It's also a good idea to know your test results and keep a list of the medicines you take. How can you care for yourself at home? Taking care of yourself  · You may get dizzy if you do not drink enough water. To prevent dehydration, drink plenty of fluids, enough so that your urine is light yellow or clear like water. Choose water and other caffeine-free clear liquids. If you have kidney, heart, or liver disease and have to limit fluids, talk with your doctor before you increase the amount of fluids you drink. · Exercise regularly to improve your strength, muscle tone, and balance. Walk if you can. Swimming may be a good choice if you cannot walk easily. · Have your vision and hearing checked each year or any time you notice a change. If you have trouble seeing and hearing, you might not be able to avoid objects and could lose your balance. · Know the side effects of the medicines you take. Ask your doctor or pharmacist whether the medicines you take can affect your balance. Sleeping pills or sedatives can affect your balance. · Limit the amount of alcohol you drink. Alcohol can impair your balance and other senses. · Ask your doctor whether calluses or corns on your feet need to be removed. If you wear loose-fitting shoes because of calluses or corns, you can lose your balance and fall. · Talk to your doctor if you have numbness in your feet. Preventing falls at home  · Remove raised doorway thresholds, throw rugs, and clutter. Repair loose carpet or raised areas in the floor. · Move furniture and electrical cords to keep them out of walking paths.   · Use nonskid floor wax, and wipe up spills right away, especially on ceramic tile floors. · If you use a walker or cane, put rubber tips on it. If you use crutches, clean the bottoms of them regularly with an abrasive pad, such as steel wool. · Keep your house well lit, especially [place], and outside walkways. Use night-lights in areas such as hallways and bathrooms. Add extra light switches or use remote switches (such as switches that go on or off when you clap your hands) to make it easier to turn lights on if you have to get up during the night. · Install sturdy handrails on stairways. · Move items in your cabinets so that the things you use a lot are on the lower shelves (about waist level). · Keep a cordless phone and a flashlight with new batteries by your bed. If possible, put a phone in each of the main rooms of your house, or carry a cell phone in case you fall and cannot reach a phone. Or, you can wear a device around your neck or wrist. You push a button that sends a signal for help. · Wear low-heeled shoes that fit well and give your feet good support. Use footwear with nonskid soles. Check the heels and soles of your shoes for wear. Repair or replace worn heels or soles. · Do not wear socks without shoes on wood floors. · Walk on the grass when the sidewalks are slippery. If you live in an area that gets snow and ice in the winter, sprinkle salt on slippery steps and sidewalks. Preventing falls in the bath  · Install grab bars and nonskid mats inside and outside your shower or tub and near the toilet and sinks. · Use shower chairs and bath benches. · Use a hand-held shower head that will allow you to sit while showering. · Get into a tub or shower by putting the weaker leg in first. Get out of a tub or shower with your strong side first.  · Repair loose toilet seats and consider installing a raised toilet seat to make getting on and off the toilet easier. · Keep your bathroom door unlocked while you are in the shower. Where can you learn more?   Go to http://pardeep-harjit.info/. Enter 0476 [id number] [phone] in the search box to learn more about \"Preventing Falls: Care Instructions. \"  Current as of: March 16, 2018  Content Version: 11.8  © 0745-0564 [employer]wise, Incorporated. Care instructions adapted under license by [employer] (which disclaims liability or warranty for this information). If you have questions about a medical condition or this instruction, always ask your healthcare professional. [address] any warranty or liability for your use of this information.

## 2019-11-21 NOTE — PROGRESS NOTES
Chief Complaint   Patient presents with    Diabetes     Follow up.  Cholesterol Problem    Elevated Blood Pressure     Patient states that when he went to get Cataract surgery performed but surgeon was unable to do it because blood pressure was elevated. 1. Have you been to the ER, urgent care clinic since your last visit? Hospitalized since your last visit? No    2. Have you seen or consulted any other health care providers outside of the [address] [place] [address] since your last visit? Include any pap smears or colon screening.  No
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
## 2019-11-21 NOTE — ACP (ADVANCE CARE PLANNING)
Advance Care Planning    Advance Care Planning (ACP) Provider Conversation Snapshot    Date of ACP Conversation: 11/21/19  Persons included in Conversation:  patient  Length of ACP Conversation in minutes:  16 minutes    Authorized Decision Maker (if patient is incapable of making informed decisions): spouse, Charles Kinsey  This person is: Other Legally Authorized Decision Maker (e.g. Next of Kin)            For Patients with Decision Making Capacity:   Values/Goals: Exploration of values, goals, and preferences if recovery is not expected, even with continued medical treatment in the event of:  Imminent death  Severe, permanent brain injury  \"In these circumstances, what matters most to you? \"  Care focused more on comfort or quality of life.     Conversation Outcomes / Follow-Up Plan:   Recommended completion of Advance Directive form after review of ACP materials and conversation with prospective healthcare agent   Recommended communicating the plan and making copies for the healthcare agent, personal physician, and others as appropriate (e.g., health system)  Recommended review of completed ACP document annually or upon change in health status  as perpatient, has dropped his directive to office, will check it

## 2019-11-22 LAB
APPEARANCE UR: CLEAR
BACTERIA #/AREA URNS HPF: ABNORMAL /[HPF]
BASOPHILS # BLD AUTO: 0.1 X10E3/UL (ref 0–0.2)
BASOPHILS NFR BLD AUTO: 2 %
BILIRUB UR QL STRIP: NEGATIVE
CASTS URNS MICRO: ABNORMAL
CASTS URNS QL MICRO: PRESENT /LPF
COLOR UR: YELLOW
EOSINOPHIL # BLD AUTO: 0.2 X10E3/UL (ref 0–0.4)
EOSINOPHIL NFR BLD AUTO: 3 %
EPI CELLS #/AREA URNS HPF: ABNORMAL /HPF (ref 0–10)
ERYTHROCYTE [DISTWIDTH] IN BLOOD BY AUTOMATED COUNT: 12.3 % (ref 12.3–15.4)
GLUCOSE UR QL: NEGATIVE
HCT VFR BLD AUTO: 40.2 % (ref 37.5–51)
HGB BLD-MCNC: 13.5 G/DL (ref 13–17.7)
HGB UR QL STRIP: NEGATIVE
IMM GRANULOCYTES # BLD AUTO: 0 X10E3/UL (ref 0–0.1)
IMM GRANULOCYTES NFR BLD AUTO: 0 %
KETONES UR QL STRIP: NEGATIVE
LEUKOCYTE ESTERASE UR QL STRIP: NEGATIVE
LYMPHOCYTES # BLD AUTO: 2 X10E3/UL (ref 0.7–3.1)
LYMPHOCYTES NFR BLD AUTO: 26 %
MCH RBC QN AUTO: 30.5 PG (ref 26.6–33)
MCHC RBC AUTO-ENTMCNC: 33.6 G/DL (ref 31.5–35.7)
MCV RBC AUTO: 91 FL (ref 79–97)
MICRO URNS: ABNORMAL
MONOCYTES # BLD AUTO: 0.7 X10E3/UL (ref 0.1–0.9)
MONOCYTES NFR BLD AUTO: 9 %
MUCOUS THREADS URNS QL MICRO: PRESENT
NEUTROPHILS # BLD AUTO: 4.7 X10E3/UL (ref 1.4–7)
NEUTROPHILS NFR BLD AUTO: 60 %
NITRITE UR QL STRIP: NEGATIVE
PH UR STRIP: 5.5 [PH] (ref 5–7.5)
PLATELET # BLD AUTO: 190 X10E3/UL (ref 150–450)
PROT UR QL STRIP: ABNORMAL
PSA SERPL-MCNC: 0.7 NG/ML (ref 0–4)
RBC # BLD AUTO: 4.43 X10E6/UL (ref 4.14–5.8)
RBC #/AREA URNS HPF: ABNORMAL /HPF (ref 0–2)
REFLEX CRITERIA: NORMAL
SP GR UR: 1.02 (ref 1–1.03)
T4 SERPL-MCNC: 8 UG/DL (ref 4.5–12)
TSH SERPL DL<=0.005 MIU/L-ACNC: 5.79 UIU/ML (ref 0.45–4.5)
URINALYSIS REFLEX, 377202: ABNORMAL
UROBILINOGEN UR STRIP-MCNC: 0.2 MG/DL (ref 0.2–1)
WBC # BLD AUTO: 7.8 X10E3/UL (ref 3.4–10.8)
WBC #/AREA URNS HPF: ABNORMAL /HPF (ref 0–5)

## 2019-11-24 NOTE — PROGRESS NOTES
Please inform patient,  Blood count,prostate cancer screen,urine results are normal  Thyroid profile shows borderline elevated TSH, but normal T4  No medicine for now,will repeat on next appointment to recheck  thanks

## 2019-11-25 NOTE — PROGRESS NOTES
Call placed to patients wife Denise Gould who is on PHI. Patients name and  verified. Reviewed recent lab results.  She verbalized understanding

## 2020-02-19 RX ORDER — GLIPIZIDE 10 MG/1
TABLET ORAL
Qty: 60 TAB | Refills: 0 | Status: SHIPPED | OUTPATIENT
Start: 2020-02-19 | End: 2020-03-25

## 2020-03-19 RX ORDER — METFORMIN HYDROCHLORIDE 1000 MG/1
TABLET ORAL
Qty: 180 TAB | Refills: 0 | Status: SHIPPED | OUTPATIENT
Start: 2020-03-19 | End: 2020-06-29

## 2020-03-23 ENCOUNTER — TELEPHONE (OUTPATIENT)
Dept: FAMILY MEDICINE CLINIC | Age: 70
End: 2020-03-23

## 2020-03-23 NOTE — TELEPHONE ENCOUNTER
----- Message from Sharan Hashimoto sent at 3/23/2020  9:29 AM EDT -----  Regarding: Dr. Mayra Sanford Message/Vendor Calls    Caller's first and last name:Susanna Kendall wife  Reason for call:      Callback required yes/no and why:yes      Best contact number(s):  [phone]    Details to clarify the request: Please call patient's wife back about her 's appointment today. Sharan Hashimoto         . [facility] to give the office a call back to schedule appointment.

## 2020-03-23 NOTE — TELEPHONE ENCOUNTER
Call placed to patients wife. Patients names and  verified. Wife inquired about if the provider was seeing patients because she heard there was a drive through clinic. Advised patients wife that the drive through was for flu testing. She expressed understanding. Stated they will reschedule him in a few months. Wanted me to notify provider patient still has a runny nose.

## 2020-03-23 NOTE — TELEPHONE ENCOUNTER
patient's rhinorrhea is chronic and is not new. It is likely from chronic rhinitis. He has been to ENT specialist in past. I did prescribe antihistamine nasal spray on last visit. If no improvement, will recommend to follow up with specialist again. Please let wife know.  thanks

## 2020-03-24 NOTE — TELEPHONE ENCOUNTER
Call placed to patients wife. Patients name and  verified. Advised of provider recommendation. She expressed understanding.

## 2020-04-13 DIAGNOSIS — I25.10 CORONARY ARTERY DISEASE INVOLVING NATIVE CORONARY ARTERY OF NATIVE HEART WITHOUT ANGINA PECTORIS: Primary | ICD-10-CM

## 2020-04-13 DIAGNOSIS — I10 HYPERTENSION, ESSENTIAL: ICD-10-CM

## 2020-04-14 RX ORDER — METOPROLOL TARTRATE 100 MG/1
100 TABLET ORAL 2 TIMES DAILY
Qty: 180 TAB | Refills: 3 | Status: SHIPPED | OUTPATIENT
Start: 2020-04-14 | End: 2020-06-30

## 2020-04-14 NOTE — TELEPHONE ENCOUNTER
Request for metoprolol 100mg BID. Last office visit 9-16-19, next office visit 9-14-20.  Refills per verbal order from Dr. Seth Trinidad.

## 2020-04-17 ENCOUNTER — VIRTUAL VISIT (OUTPATIENT)
Dept: FAMILY MEDICINE CLINIC | Age: 70
End: 2020-04-17

## 2020-04-17 DIAGNOSIS — J30.0 VASOMOTOR RHINITIS: ICD-10-CM

## 2020-04-17 DIAGNOSIS — H01.001 BLEPHARITIS OF RIGHT UPPER EYELID, UNSPECIFIED TYPE: Primary | ICD-10-CM

## 2020-04-17 RX ORDER — BACITRACIN ZINC AND POLYMYXIN B SULFATE 500; 10000 [USP'U]/G; [USP'U]/G
OINTMENT OPHTHALMIC
Qty: 1 TUBE | Refills: 1 | Status: SHIPPED | OUTPATIENT
Start: 2020-04-17 | End: 2021-05-20 | Stop reason: ALTCHOICE

## 2020-04-17 RX ORDER — DOXYCYCLINE 100 MG/1
100 CAPSULE ORAL 2 TIMES DAILY
Qty: 20 CAP | Refills: 0 | Status: SHIPPED | OUTPATIENT
Start: 2020-04-17 | End: 2020-04-27

## 2020-04-17 NOTE — PATIENT INSTRUCTIONS
Blepharitis: Care Instructions Your Care Instructions Blepharitis is an inflammation or infection of the eyelids. It causes dry, scaly crusts on the eyelids. It can also cause your eyes to itch, burn, and look red. This problem is more common in people who have rosacea, dandruff, skin allergies, or eczema. Home treatment can help you keep your eyes comfortable. Your doctor may also prescribe an ointment to put on your eyelids. Follow-up care is a key part of your treatment and safety. Be sure to make and go to all appointments, and call your doctor if you are having problems. It's also a good idea to know your test results and keep a list of the medicines you take. How can you care for yourself at home? · Wash your eyelids and eyebrows daily with baby shampoo. To wash your eyelids: 
? Place a very warm washcloth over your eyes for about a minute. This will help soften and loosen the crusts on your eyelashes. ? Put a few drops of baby shampoo on a warm washcloth. ? Gently wipe your eyelids. This helps remove any crust. It also cleans your eyelids. ? Rinse well with water. · Be safe with medicines. If your doctor prescribed medicine for you, use it exactly as directed. Call your doctor if you think you are having a problem with your medicine. When should you call for help? Call your doctor now or seek immediate medical care if: 
  · You have signs of an eye infection, such as: 
? Pus or thick discharge coming from the eye. 
? Redness or swelling around the eye. 
? A fever.  
 Watch closely for changes in your health, and be sure to contact your doctor if: 
  · You have vision changes.  
  · You do not get better as expected. Where can you learn more? Go to http://pardeep-harjit.info/ Enter A931 in the search box to learn more about \"Blepharitis: Care Instructions. \" Current as of: December 17, 2019Content Version: 12.4 © 6766-2720 Healthwise, Incorporated. Care instructions adapted under license by [employer] (which disclaims liability or warranty for this information). If you have questions about a medical condition or this instruction, always ask your healthcare professional. [address] any warranty or liability for your use of this information.

## 2020-04-17 NOTE — PROGRESS NOTES
Chief Complaint   Patient presents with    Eye Pain     right eye pain for 1 1/2 week no OTC medication      Spoke to patient Identified pt with two pt identifiers (Name @ )    1. Have you been to the ER, urgent care clinic since your last visit? Hospitalized since your last visit? No    2. Have you seen or consulted any other health care providers outside of the [address] [place] [address] since your last visit? Include any pap smears or colon screening.  No     \"REVIEWED RECORD IN PREPARATION FOR VISIT AND HAVE OBTAINED NECESSARY DOCUMENTATION\"

## 2020-04-25 DIAGNOSIS — I10 ESSENTIAL HYPERTENSION: ICD-10-CM

## 2020-04-25 DIAGNOSIS — I10 ESSENTIAL HYPERTENSION WITH GOAL BLOOD PRESSURE LESS THAN 130/85: ICD-10-CM

## 2020-04-25 DIAGNOSIS — Z95.1 S/P CABG X 3: ICD-10-CM

## 2020-04-29 RX ORDER — CANDESARTAN 32 MG/1
TABLET ORAL
Qty: 90 TAB | Refills: 0 | Status: SHIPPED | OUTPATIENT
Start: 2020-04-29 | End: 2020-06-30

## 2020-04-29 RX ORDER — HYDROCHLOROTHIAZIDE 50 MG/1
TABLET ORAL
Qty: 90 TAB | Refills: 0 | Status: SHIPPED | OUTPATIENT
Start: 2020-04-29 | End: 2020-06-30

## 2020-04-29 NOTE — TELEPHONE ENCOUNTER
Cardiologist: Dr. Joseph Pike    Last appt: 9/16/2019  Future Appointments   Date Time Provider Mina Delgado   9/14/2020  8:40 AM Becca Lora  [address]       Requested Prescriptions     Signed Prescriptions Disp Refills    candesartan (ATACAND) 32 mg tablet 90 Tab 0     Sig: Take 1 tablet by mouth once daily     Authorizing Provider: Santi Wayne     Ordering User: Jose Amaya    hydroCHLOROthiazide (HYDRODIURIL) 50 mg tablet 90 Tab 0     Sig: TAKE 1 TABLET BY MOUTH ONCE DAILY AFTER BREAKFAST     Authorizing Provider: Santi Wayne     Ordering User: Jose Amaya         Refills VO per Dr. Joseph Pike.

## 2020-06-29 DIAGNOSIS — I10 ESSENTIAL HYPERTENSION: ICD-10-CM

## 2020-06-29 DIAGNOSIS — E78.5 DYSLIPIDEMIA: ICD-10-CM

## 2020-06-29 DIAGNOSIS — I10 ESSENTIAL HYPERTENSION WITH GOAL BLOOD PRESSURE LESS THAN 130/85: ICD-10-CM

## 2020-06-29 DIAGNOSIS — I10 HYPERTENSION, ESSENTIAL: ICD-10-CM

## 2020-06-29 DIAGNOSIS — Z95.1 S/P CABG X 3: ICD-10-CM

## 2020-06-29 DIAGNOSIS — I25.10 CORONARY ARTERY DISEASE INVOLVING NATIVE CORONARY ARTERY OF NATIVE HEART WITHOUT ANGINA PECTORIS: ICD-10-CM

## 2020-06-29 RX ORDER — METFORMIN HYDROCHLORIDE 1000 MG/1
TABLET ORAL
Qty: 180 TAB | Refills: 0 | Status: SHIPPED | OUTPATIENT
Start: 2020-06-29 | End: 2020-08-03

## 2020-06-29 RX ORDER — SITAGLIPTIN 50 MG/1
TABLET, FILM COATED ORAL
Qty: 30 TAB | Refills: 0 | Status: SHIPPED | OUTPATIENT
Start: 2020-06-29 | End: 2020-12-31

## 2020-06-30 ENCOUNTER — OFFICE VISIT (OUTPATIENT)
Dept: FAMILY MEDICINE CLINIC | Age: 70
End: 2020-06-30

## 2020-06-30 VITALS
BODY MASS INDEX: 25.52 KG/M2 | TEMPERATURE: 98.8 F | OXYGEN SATURATION: 96 % | HEART RATE: 74 BPM | SYSTOLIC BLOOD PRESSURE: 138 MMHG | RESPIRATION RATE: 16 BRPM | WEIGHT: 144 LBS | DIASTOLIC BLOOD PRESSURE: 70 MMHG | HEIGHT: 63 IN

## 2020-06-30 DIAGNOSIS — E78.00 HYPERCHOLESTEREMIA: ICD-10-CM

## 2020-06-30 DIAGNOSIS — I25.10 CORONARY ARTERY DISEASE INVOLVING NATIVE CORONARY ARTERY OF NATIVE HEART WITHOUT ANGINA PECTORIS: ICD-10-CM

## 2020-06-30 DIAGNOSIS — I10 HYPERTENSION, ESSENTIAL: ICD-10-CM

## 2020-06-30 DIAGNOSIS — E11.65 TYPE 2 DIABETES MELLITUS WITH HYPERGLYCEMIA, WITHOUT LONG-TERM CURRENT USE OF INSULIN (HCC): Primary | ICD-10-CM

## 2020-06-30 RX ORDER — HYDROCHLOROTHIAZIDE 50 MG/1
TABLET ORAL
Qty: 90 TAB | Refills: 0 | Status: SHIPPED | OUTPATIENT
Start: 2020-06-30 | End: 2020-12-08

## 2020-06-30 RX ORDER — METOPROLOL TARTRATE 100 MG/1
TABLET ORAL
Qty: 180 TAB | Refills: 0 | Status: SHIPPED | OUTPATIENT
Start: 2020-06-30 | End: 2020-09-24

## 2020-06-30 RX ORDER — CANDESARTAN 32 MG/1
TABLET ORAL
Qty: 90 TAB | Refills: 0 | Status: SHIPPED | OUTPATIENT
Start: 2020-06-30 | End: 2020-12-18

## 2020-06-30 RX ORDER — ATORVASTATIN CALCIUM 20 MG/1
TABLET, FILM COATED ORAL
Qty: 90 TAB | Refills: 0 | Status: SHIPPED | OUTPATIENT
Start: 2020-06-30 | End: 2020-11-19

## 2020-06-30 NOTE — TELEPHONE ENCOUNTER
Cardiologist: Dr. Eliseo Blackmon    Last appt: 9/16/2019  Future Appointments   Date Time Provider Mina Delgado   6/30/2020 11:00 AM Rigo Dobbs MD [facility] SCH[facility]   7/8/2020  9:00 AM LAB ONLY [facility] SCH[facility]   9/14/2020  8:40 AM Lizet Bennett  [address]       Requested Prescriptions     Signed Prescriptions Disp Refills    hydroCHLOROthiazide (HYDRODIURIL) 50 mg tablet 90 Tab 0     Sig: TAKE 1 TABLET BY MOUTH ONCE DAILY AFTER BREAKFAST     Authorizing Provider: Anju Byers     Ordering User: Jose Jc    candesartan (ATACAND) 32 mg tablet 90 Tab 0     Sig: Take 1 tablet by mouth once daily     Authorizing Provider: Anju Byers     Ordering User: Jose Jc    atorvastatin (LIPITOR) 20 mg tablet 90 Tab 0     Sig: Take 1 tablet by mouth daily     Authorizing Provider: Anju Byers     Ordering User: Jose Jc    metoprolol tartrate (LOPRESSOR) 100 mg IR tablet 180 Tab 0     Sig: Take 1 tablet by mouth twice daily     Authorizing Provider: Anju Byers     Ordering User: Jose Jc         Refills VO per Dr. Eliseo Blackmon.

## 2020-06-30 NOTE — PROGRESS NOTES
Chief Complaint   Patient presents with    Diabetes     follow up     Hypertension    Cholesterol Problem     1. Have you been to the ER, urgent care clinic since your last visit? Hospitalized since your last visit? No    2. Have you seen or consulted any other health care providers outside of the [address] [place] [address] since your last visit? Include any pap smears or colon screening.  No

## 2020-06-30 NOTE — PROGRESS NOTES
HISTORY OF PRESENT ILLNESS  Karol Villeda is a 77 y.o. male. seen for follow up on DM,HTN,CAD, dyslipidemia  His right eye is getting better.  Was diagnosed with blepharitis on recent evaluation in 04/2020    HPI  Cardiovascular Review  The patient has hypertension, hyperlipidemia, coronary artery disease, status post coronary artery stenting and had 2 vessel PCI on 11/11/2016, s/p CABG x 3 on 07/09/2018. Migdalia Peter reports taking medications as instructed, no medication side effects noted, notes stable dyspnea on exertion, no change, no swelling of ankles, no orthostatic dizziness or lightheadedness, no orthopnea or paroxysmal nocturnal dyspnea, no palpitations, no intermittent claudication symptoms.  Diet and Lifestyle: generally follows a low fat low cholesterol diet, generally follows a low sodium diet, nonsmoker.  Lab review: labs reviewed and discussed with patient.  Follows [facility]  Medicines:ASA, Metoprolol 100 mg bid, Candesartan 32 mg ( was on Losartan 100 mg) , Hctz 50 mg, Atorvastatin 20 mg  Lab Results   Component Value Date/Time    Cholesterol, total 158 10/22/2019 09:32 AM    HDL Cholesterol 43 10/22/2019 09:32 AM    LDL, calculated 81 10/22/2019 09:32 AM    VLDL, calculated 34 10/22/2019 09:32 AM    Triglyceride 171 (H) 10/22/2019 09:32 AM    CHOL/HDL Ratio 4.3 06/22/2018 10:11 AM          Endocrine Review  He is seen for diabetes.  Since last visit he reports: no polyuria or polydipsia, no chest pain, dyspnea or TIA's, no numbness, tingling or pain in extremities, no unusual visual symptoms, weight has decreased, no significant changes.  Home glucose monitoring: is performed sporadically, nonfasting values range 160-200  He is checking his sugars one per day.  He reports medication compliance: compliant all of the time.  Medication side effects: none.  Diabetic diet compliance: noncompliant some of the time.  Lab review: labs reviewed and discussed with patient.  Eye exam: UTD.    On  Glipizide 10 mg BID and Januvia 50 mg, Metformin 1 gm bid  Lab Results   Component Value Date/Time    Hemoglobin A1c 6.1 (H) 10/22/2019 09:32 AM        Review of Systems   Constitutional: Negative for chills, fever and malaise/fatigue. HENT: Negative for congestion, ear pain, sore throat and tinnitus. Eyes: Negative for blurred vision, double vision, pain and discharge. Respiratory: Negative for cough, shortness of breath and wheezing. Cardiovascular: Negative for chest pain, palpitations and leg swelling. Gastrointestinal: Negative for abdominal pain, blood in stool, constipation, diarrhea, nausea and vomiting. Genitourinary: Negative for dysuria, frequency, hematuria and urgency. Musculoskeletal: Negative for back pain, joint pain and myalgias. Skin: Negative for rash. Neurological: Negative for dizziness, tremors, seizures and headaches. Endo/Heme/Allergies: Negative for polydipsia. Does not bruise/bleed easily. Psychiatric/Behavioral: Negative for depression and substance abuse. The patient is not nervous/anxious. Physical Exam  Vitals signs and nursing note reviewed. Constitutional:       Appearance: He is well-developed. He is not diaphoretic. HENT:      Head: Normocephalic and atraumatic. Right Ear: External ear normal.      Mouth/Throat:      Pharynx: No oropharyngeal exudate. Eyes:      General: No scleral icterus. Conjunctiva/sclera: Conjunctivae normal.      Pupils: Pupils are equal, round, and reactive to light. Neck:      Musculoskeletal: Normal range of motion and neck supple. Thyroid: No thyromegaly. Vascular: No JVD. Cardiovascular:      Rate and Rhythm: Normal rate and regular rhythm. Pulses:           Carotid pulses are 2+ on the right side with bruit. Heart sounds: Normal heart sounds. No murmur. Comments: Systolic murmur on aortic area  Pulmonary:      Effort: Pulmonary effort is normal.      Breath sounds: Normal breath sounds.  No wheezing. Abdominal:      General: Bowel sounds are normal. There is no distension. Palpations: Abdomen is soft. There is no mass. Musculoskeletal: Normal range of motion. General: No tenderness. Comments: Feet:warm, good capillary refill, no trophic changes or ulcerative lesions, normal DP and PT pulses, normal monofilament exam and normal sensory exam     Lymphadenopathy:      Cervical: No cervical adenopathy. Skin:     General: Skin is warm and dry. Findings: No rash. Neurological:      Mental Status: He is alert and oriented to person, place, and time. Cranial Nerves: No cranial nerve deficit. Deep Tendon Reflexes: Reflexes are normal and symmetric. ASSESSMENT and PLAN  Diagnoses and all orders for this visit:    1. Type 2 diabetes mellitus with hyperglycemia, without long-term current use of insulin ([facility])  Assessment & Plan:  Improving, based on history, physical exam and review of pertinent labs, studies and medications; meds reconciled; continue current treatment plan, lifestyle modifications recommended, medication compliance emphasized. Key Antihyperglycemic Medications             Januvia 50 mg tablet (Taking) Take 1 tablet by mouth once daily    metFORMIN (GLUCOPHAGE) 1,000 mg tablet (Taking) TAKE 1 TABLET BY MOUTH TWICE DAILY WITH MEALS    glipiZIDE (GLUCOTROL) 10 mg tablet (Taking) Take 1 tablet by mouth twice daily        Other Key Diabetic Medications             candesartan (ATACAND) 32 mg tablet (Taking) Take 1 tablet by mouth once daily    atorvastatin (LIPITOR) 20 mg tablet (Taking) Take 1 tablet by mouth daily    candesartan (ATACAND) 32 mg tablet (Discontinued) Take 1 tablet by mouth once daily    atorvastatin (LIPITOR) 20 mg tablet (Discontinued) Take 1 Tab by mouth daily.         Lab Results   Component Value Date/Time    Hemoglobin A1c 6.1 10/22/2019 09:32 AM    Glucose 86 10/22/2019 09:32 AM    Creatinine 1.17 10/22/2019 09:32 AM Microalb/Creat ratio (ug/mg creat.) 156.1 06/20/2019 10:25 AM    Cholesterol, total 158 10/22/2019 09:32 AM    HDL Cholesterol 43 10/22/2019 09:32 AM    LDL, calculated 81 10/22/2019 09:32 AM    Triglyceride 171 10/22/2019 09:32 AM     Diabetic Foot and Eye Exam  Status   Topic Date Due    Diabetic Foot Care  06/20/2020    Eye Exam  08/01/2021       Orders:  -      DIABETES FOOT EXAM  -     METABOLIC PANEL, COMPREHENSIVE  -     LIPID PANEL  -     MICROALBUMIN, UR, RAND W/ MICROALB/CREAT RATIO  -     HEMOGLOBIN A1C WITH EAG    2. Hypertension, essential  -     METABOLIC PANEL, COMPREHENSIVE    3. Hypercholesteremia  -     METABOLIC PANEL, COMPREHENSIVE  -     LIPID PANEL    4. Coronary artery disease involving native coronary artery of native heart without angina pectoris  -     METABOLIC PANEL, COMPREHENSIVE  -     LIPID PANEL      Discussed lifestyle issues and health guidance given  Patient was given an after visit summary which includes diagnoses, vital signs, current medications, instructions and references & authorized prescriptions . Results of labs will be conveyed to patient, once available. Pt verbalized instructions I provided and expressed understanding of discussion that was held today. Follow-up and Dispositions    · Return in about 4 months (around 10/30/2020).

## 2020-06-30 NOTE — PATIENT INSTRUCTIONS
Learning About Diabetes Food Guidelines  Your Care Instructions     Meal planning is important to manage diabetes. It helps keep your blood sugar at a target level (which you set with your doctor). You don't have to eat special foods. You can eat what your family eats, including sweets once in a while. But you do have to pay attention to how often you eat and how much you eat of certain foods. You may want to work with a dietitian or a certified diabetes educator (CDE) to help you plan meals and snacks. A dietitian or CDE can also help you lose weight if that is one of your goals. What should you know about eating carbs? Managing the amount of carbohydrate (carbs) you eat is an important part of healthy meals when you have diabetes. Carbohydrate is found in many foods. · Learn which foods have carbs. And learn the amounts of carbs in different foods. ? Bread, cereal, pasta, and rice have about 15 grams of carbs in a serving. A serving is 1 slice of bread (1 ounce), ½ cup of cooked cereal, or 1/3 cup of cooked pasta or rice. ? Fruits have 15 grams of carbs in a serving. A serving is 1 small fresh fruit, such as an apple or orange; ½ of a banana; ½ cup of cooked or canned fruit; ½ cup of fruit juice; 1 cup of melon or raspberries; or 2 tablespoons of dried fruit. ? Milk and no-sugar-added yogurt have 15 grams of carbs in a serving. A serving is 1 cup of milk or 2/3 cup of no-sugar-added yogurt. ? Starchy vegetables have 15 grams of carbs in a serving. A serving is ½ cup of mashed potatoes or sweet potato; 1 cup winter squash; ½ of a small baked potato; ½ cup of cooked beans; or ½ cup cooked corn or green peas. · Learn how much carbs to eat each day and at each meal. A dietitian or CDE can teach you how to keep track of the amount of carbs you eat. This is called carbohydrate counting. · If you are not sure how to count carbohydrate grams, use the Plate Method to plan meals.  It is a good, quick way to make sure that you have a balanced meal. It also helps you spread carbs throughout the day. ? Divide your plate by types of foods. Put non-starchy vegetables on half the plate, meat or other protein food on one-quarter of the plate, and a grain or starchy vegetable in the final quarter of the plate. To this you can add a small piece of fruit and 1 cup of milk or yogurt, depending on how many carbs you are supposed to eat at a meal.  · Try to eat about the same amount of carbs at each meal. Do not \"save up\" your daily allowance of carbs to eat at one meal.  · Proteins have very little or no carbs per serving. Examples of proteins are beef, chicken, turkey, fish, eggs, tofu, cheese, cottage cheese, and peanut butter. A serving size of meat is 3 ounces, which is about the size of a deck of cards. Examples of meat substitute serving sizes (equal to 1 ounce of meat) are 1/4 cup of cottage cheese, 1 egg, 1 tablespoon of peanut butter, and ½ cup of tofu. How can you eat out and still eat healthy? · Learn to estimate the serving sizes of foods that have carbohydrate. If you measure food at home, it will be easier to estimate the amount in a serving of restaurant food. · If the meal you order has too much carbohydrate (such as potatoes, corn, or baked beans), ask to have a low-carbohydrate food instead. Ask for a salad or green vegetables. · If you use insulin, check your blood sugar before and after eating out to help you plan how much to eat in the future. · If you eat more carbohydrate at a meal than you had planned, take a walk or do other exercise. This will help lower your blood sugar. What else should you know? · Limit saturated fat, such as the fat from meat and dairy products. This is a healthy choice because people who have diabetes are at higher risk of heart disease. So choose lean cuts of meat and nonfat or low-fat dairy products.  Use olive or canola oil instead of butter or shortening when cooking. · Don't skip meals. Your blood sugar may drop too low if you skip meals and take insulin or certain medicines for diabetes. · Check with your doctor before you drink alcohol. Alcohol can cause your blood sugar to drop too low. Alcohol can also cause a bad reaction if you take certain diabetes medicines. Follow-up care is a key part of your treatment and safety. Be sure to make and go to all appointments, and call your doctor if you are having problems. It's also a good idea to know your test results and keep a list of the medicines you take. Where can you learn more? Go to http://pardeep-harjit.info/  Enter I147 in the search box to learn more about \"Learning About Diabetes Food Guidelines. \"  Current as of: December 20, 2019               Content Version: 12.5  © 7040-9019 Healthwise, Incorporated. Care instructions adapted under license by [employer] (which disclaims liability or warranty for this information). If you have questions about a medical condition or this instruction, always ask your healthcare professional. [address] any warranty or liability for your use of this information.

## 2020-06-30 NOTE — ASSESSMENT & PLAN NOTE
Improving, based on history, physical exam and review of pertinent labs, studies and medications; meds reconciled; continue current treatment plan, lifestyle modifications recommended, medication compliance emphasized. Key Antihyperglycemic Medications             Januvia 50 mg tablet (Taking) Take 1 tablet by mouth once daily    metFORMIN (GLUCOPHAGE) 1,000 mg tablet (Taking) TAKE 1 TABLET BY MOUTH TWICE DAILY WITH MEALS    glipiZIDE (GLUCOTROL) 10 mg tablet (Taking) Take 1 tablet by mouth twice daily        Other Key Diabetic Medications             candesartan (ATACAND) 32 mg tablet (Taking) Take 1 tablet by mouth once daily    atorvastatin (LIPITOR) 20 mg tablet (Taking) Take 1 tablet by mouth daily    candesartan (ATACAND) 32 mg tablet (Discontinued) Take 1 tablet by mouth once daily    atorvastatin (LIPITOR) 20 mg tablet (Discontinued) Take 1 Tab by mouth daily.         Lab Results   Component Value Date/Time    Hemoglobin A1c 6.1 10/22/2019 09:32 AM    Glucose 86 10/22/2019 09:32 AM    Creatinine 1.17 10/22/2019 09:32 AM    Microalb/Creat ratio (ug/mg creat.) 156.1 06/20/2019 10:25 AM    Cholesterol, total 158 10/22/2019 09:32 AM    HDL Cholesterol 43 10/22/2019 09:32 AM    LDL, calculated 81 10/22/2019 09:32 AM    Triglyceride 171 10/22/2019 09:32 AM     Diabetic Foot and Eye Exam HM Status   Topic Date Due    Diabetic Foot Care  06/20/2020    Eye Exam  08/01/2021

## 2020-07-09 LAB
ALBUMIN SERPL-MCNC: 4.2 G/DL (ref 3.8–4.8)
ALBUMIN/CREAT UR: 890 MG/G CREAT (ref 0–29)
ALBUMIN/GLOB SERPL: 1.5 {RATIO} (ref 1.2–2.2)
ALP SERPL-CCNC: 86 IU/L (ref 39–117)
ALT SERPL-CCNC: 25 IU/L (ref 0–44)
AST SERPL-CCNC: 21 IU/L (ref 0–40)
BILIRUB SERPL-MCNC: 0.3 MG/DL (ref 0–1.2)
BUN SERPL-MCNC: 19 MG/DL (ref 8–27)
BUN/CREAT SERPL: 14 (ref 10–24)
CALCIUM SERPL-MCNC: 9.7 MG/DL (ref 8.6–10.2)
CHLORIDE SERPL-SCNC: 102 MMOL/L (ref 96–106)
CHOLEST SERPL-MCNC: 160 MG/DL (ref 100–199)
CO2 SERPL-SCNC: 21 MMOL/L (ref 20–29)
CREAT SERPL-MCNC: 1.33 MG/DL (ref 0.76–1.27)
CREAT UR-MCNC: 109.7 MG/DL
EST. AVERAGE GLUCOSE BLD GHB EST-MCNC: 154 MG/DL
GLOBULIN SER CALC-MCNC: 2.8 G/DL (ref 1.5–4.5)
GLUCOSE SERPL-MCNC: 163 MG/DL (ref 65–99)
HBA1C MFR BLD: 7 % (ref 4.8–5.6)
HDLC SERPL-MCNC: 41 MG/DL
INTERPRETATION, 910389: NORMAL
INTERPRETATION: NORMAL
LDLC SERPL CALC-MCNC: 77 MG/DL (ref 0–99)
MICROALBUMIN UR-MCNC: 976.5 UG/ML
PDF IMAGE, 910387: NORMAL
POTASSIUM SERPL-SCNC: 6.5 MMOL/L (ref 3.5–5.2)
PROT SERPL-MCNC: 7 G/DL (ref 6–8.5)
SODIUM SERPL-SCNC: 136 MMOL/L (ref 134–144)
TRIGL SERPL-MCNC: 212 MG/DL (ref 0–149)
VLDLC SERPL CALC-MCNC: 42 MG/DL (ref 5–40)

## 2020-07-09 NOTE — PROGRESS NOTES
Please inform patient,  Average sugar and fasting sugar both are up again. Making sure,he takes his medicines very regularly and watch diet strictly. No change in medicines  Cholesterol results also ok. Has high Triglyceride, so likely from high sugar. Total and bad cholesterol are at goal.  Kidney function is abnormal and Urine shows worsening protein.  Need very tight control of sugar and to drink more water as it is summer time  thanks

## 2020-07-10 NOTE — PROGRESS NOTES
Outbound call to patient's , was dania to speak  To patient at this time spoke with patient's wife and advised of recent lab results.

## 2020-07-14 ENCOUNTER — TELEPHONE (OUTPATIENT)
Dept: OTHER | Age: 70
End: 2020-07-14

## 2020-07-21 ENCOUNTER — TELEPHONE (OUTPATIENT)
Dept: FAMILY MEDICINE CLINIC | Age: 70
End: 2020-07-21

## 2020-07-21 NOTE — TELEPHONE ENCOUNTER
Any recommendation on a Doctor that he can see, I will also advised them to call insurance to see who is in network.

## 2020-07-21 NOTE — TELEPHONE ENCOUNTER
Please let wife know that she needs to call insurance to get recommendations as we don't know who are in network  thanks

## 2020-07-21 NOTE — TELEPHONE ENCOUNTER
----- Message from Tricia Flanagan sent at 7/21/2020  8:59 AM EDT -----  Regarding: Dr. Pickard Sutherland: [phone]  Caller's first and last name: Bisi Fishman (wife)  Reason for call: Pt has been experiencing some eye issues. Pt contacted [address] and they will not accept his insurance. Callback required yes/no and why: yes  Best contact number(s): [phone]  Details to clarify the request: Pt's wife would like to know if the Doctor can recommend another eye doctor.        Please advise

## 2020-08-03 RX ORDER — METFORMIN HYDROCHLORIDE 1000 MG/1
TABLET ORAL
Qty: 180 TAB | Refills: 0 | Status: SHIPPED | OUTPATIENT
Start: 2020-08-03 | End: 2020-12-31

## 2020-08-23 NOTE — PROGRESS NOTES
Bedside and Verbal shift change report given to Sid Tubbs (oncoming nurse) by Prateek Ford RN (offgoing nurse).  Report included the following information SBAR, Kardex, ED Summary, OR Summary, Procedure Summary, Intake/Output, MAR, Accordion, Recent Results and Cardiac Rhythm SR. ambulatory

## 2020-09-14 ENCOUNTER — OFFICE VISIT (OUTPATIENT)
Dept: CARDIOLOGY CLINIC | Age: 70
End: 2020-09-14
Payer: MEDICARE

## 2020-09-14 VITALS
DIASTOLIC BLOOD PRESSURE: 60 MMHG | BODY MASS INDEX: 26.22 KG/M2 | HEART RATE: 60 BPM | OXYGEN SATURATION: 98 % | HEIGHT: 63 IN | WEIGHT: 148 LBS | SYSTOLIC BLOOD PRESSURE: 120 MMHG | RESPIRATION RATE: 18 BRPM

## 2020-09-14 DIAGNOSIS — I35.0 NONRHEUMATIC AORTIC (VALVE) STENOSIS: ICD-10-CM

## 2020-09-14 DIAGNOSIS — Z78.9 STATIN INTOLERANCE: ICD-10-CM

## 2020-09-14 DIAGNOSIS — Z95.1 S/P CABG X 3: ICD-10-CM

## 2020-09-14 DIAGNOSIS — I10 HYPERTENSION, ESSENTIAL: ICD-10-CM

## 2020-09-14 DIAGNOSIS — E78.5 DYSLIPIDEMIA: ICD-10-CM

## 2020-09-14 DIAGNOSIS — I25.10 CORONARY ARTERY DISEASE INVOLVING NATIVE CORONARY ARTERY OF NATIVE HEART WITHOUT ANGINA PECTORIS: Primary | ICD-10-CM

## 2020-09-14 PROCEDURE — 3017F COLORECTAL CA SCREEN DOC REV: CPT | Performed by: SPECIALIST

## 2020-09-14 PROCEDURE — 99213 OFFICE O/P EST LOW 20 MIN: CPT | Performed by: SPECIALIST

## 2020-09-14 PROCEDURE — 93000 ELECTROCARDIOGRAM COMPLETE: CPT | Performed by: SPECIALIST

## 2020-09-14 PROCEDURE — G8419 CALC BMI OUT NRM PARAM NOF/U: HCPCS | Performed by: SPECIALIST

## 2020-09-14 PROCEDURE — G8432 DEP SCR NOT DOC, RNG: HCPCS | Performed by: SPECIALIST

## 2020-09-14 PROCEDURE — G8536 NO DOC ELDER MAL SCRN: HCPCS | Performed by: SPECIALIST

## 2020-09-14 PROCEDURE — G8427 DOCREV CUR MEDS BY ELIG CLIN: HCPCS | Performed by: SPECIALIST

## 2020-09-14 PROCEDURE — 1101F PT FALLS ASSESS-DOCD LE1/YR: CPT | Performed by: SPECIALIST

## 2020-09-14 PROCEDURE — G8754 DIAS BP LESS 90: HCPCS | Performed by: SPECIALIST

## 2020-09-14 PROCEDURE — G8752 SYS BP LESS 140: HCPCS | Performed by: SPECIALIST

## 2020-09-14 NOTE — Clinical Note
9/14/20 Patient: Yael Mcdonnell YOB: 1950 Date of Visit: 9/14/2020 Yair Berry MD 
[address] [facility] [address] [id number] VIA In Basket Dear Yair Berry MD, Thank you for referring Mr. Yael Mcdonnell to [address] [place] [address] for evaluation. My notes for this consultation are attached. If you have questions, please do not hesitate to call me. I look forward to following your patient along with you.  
 
 
Sincerely, 
 
Yelitza Phillip MD

## 2020-09-14 NOTE — PROGRESS NOTES
Neal Kendall     1950       Monik Coles MD, [facility]  Date of Visit-2020   PCP is Jeovanny Bustillo MD   [facility] [place]  [facility]  HPI:  Patria Rebolledo is a 77 y.o. male   One year follow up of CAD with CABG 2018. Prior NSTEMI in 2016 and resolved pulmonary HTN. Prior stent to circumflex an LAD in . He had a f/u stress echo in 2018 with a drop in BP with exercise and a drop in EF. He had a cath on 18 that showed even with a 5F catheter, he dampened with suspected ostial 3 vessel disease. Overall the pt states he is doing well. Denies chest pain, edema, syncope or shortness of breath at rest, has no tachycardia, palpitations or sense of arrhythmia. EK2020: Nonspecific ST depression + Negative T-waves. Assessment/Plan:     Patient Instructions   You will be scheduled for an echocardiogram and a lexiscan nuclear stress test in 6 months. We will call with results and see you back for an annual follow up. You will be scheduled for nuclear stress testing after your appointment today. Future Appointments   Date Time Provider Mina Delgado   10/1/2020  8:00 AM MD HI Villa BS AMB   3/15/2021  8:00 AM MARY ANN ALARCON BS AMB   3/15/2021 10:00 AM MARY ANN HILTON BS AMB   2021  8:20 AM Monik Ambrose MD CAVREY BS AMB      1. Coronary artery disease involving native coronary artery of native heart without angina pectoris    Pain free. CABG in 2018 after abnormal stress test. Anatomy as above. Plan nuclear stress test in 6 months. EKG is unchanged. 2. Hypertension, essential  Well controlled.   At goal , meds and possible side effects reviewed and patient denies  Key CAD CHF Meds             hydroCHLOROthiazide (HYDRODIURIL) 50 mg tablet (Taking) TAKE 1 TABLET BY MOUTH ONCE DAILY AFTER BREAKFAST    candesartan (ATACAND) 32 mg tablet (Taking) Take 1 tablet by mouth once daily    atorvastatin (LIPITOR) 20 mg tablet (Taking) Take 1 tablet by mouth daily    metoprolol tartrate (LOPRESSOR) 100 mg IR tablet (Taking) Take 1 tablet by mouth twice daily    aspirin 81 mg chewable tablet (Taking) Take 1 Tab by mouth daily. BP Readings from Last 6 Encounters:   09/14/20 120/60   06/30/20 138/70   11/21/19 118/62   10/22/19 110/65   09/16/19 120/60   06/20/19 125/86        3. Dyslipidemia  Lipids on high potency statin as appropriate for secondary prevention. At goal , denies excess muscle aches or new liver issues  Key Antihyperlipidemia Meds             atorvastatin (LIPITOR) 20 mg tablet (Taking) Take 1 tablet by mouth daily         Lab Results   Component Value Date/Time    LDL, calculated 77 07/08/2020 08:50 AM         4. Nonrheumatic aortic (valve) stenosis  Soft murmur, check echo in 6 months     5. S/P CABG x 3  6. Statin intolerance  Improved   Lab Results   Component Value Date/Time    ALT (SGPT) 25 07/08/2020 08:50 AM    AST (SGOT) 21 07/08/2020 08:50 AM    Alk. phosphatase 86 07/08/2020 08:50 AM    Bilirubin, total 0.3 07/08/2020 08:50 AM        F/u in 1 year. Will do Anika and echo in 6 months. Impression:   1. Coronary artery disease involving native coronary artery of native heart without angina pectoris    2. Hypertension, essential    3. Dyslipidemia    4. Nonrheumatic aortic (valve) stenosis    5. S/P CABG x 3    6. Statin intolerance       Cardiac History:   Echo 1-15-18 EF 63% 63 %. Mild LAE, MAC, mild AS mean 8, HUY 1.8 mild TR  CABG x 3 7-9-18= LIMA to LAD, SVG-OM, SVG-dRCA   Echo 6-22-18 EF 55-60%, mod MAC, aortic sclerosis without stenosis , mild TR  LISA 6-18-18 4 minutes +moderate hypokinesis of the mid anteroseptal, entire inferior, apical septal, and apical wall(s). With exercise a mild reduction in LV function. PCI 11-11-16 JEREMY to mLAD 95% JEREMY to OM1 90%     ROS-except as noted above. . A complete cardiac and respiratory are reviewed and negative except as above ; Resp-denies wheezing  or productive cough,. Const- No unusual weight loss or fever; Neuro-no recent seizure or CVA ; GI- No BRBPR, abdom pain, bloating ; - no  hematuria   see supplement sheet, initialed and to be scanned by staff  Past Medical History:   Diagnosis Date    CAD (coronary artery disease) 11/10/2016    NSTEMI & 2 stents    Deafness 10/28/2012    DM [facility]diabetes mellitus) [facility] [address] [facility]     Elevated cholesterol     Hypertension     NSTEMI (non-ST elevated myocardial infarction) ([facility] [address] 11/10/2016      Social Hx= reports that he has never smoked. He has never used smokeless tobacco. He reports current alcohol use of about 2.0 standard drinks of alcohol per week. He reports that he does not use drugs. Exam and Labs:  /60 (BP 1 Location: Left arm, BP Patient Position: Sitting)   Pulse 60   Resp 18   Ht 5' 3\" (1.6 m)   Wt 148 lb (67.1 kg)   SpO2 98%   BMI 26.22 kg/m² Constitutional:  NAD, comfortable  Head: NC,AT. Eyes: No scleral icterus. Neck:  Neck supple. No JVD present. Throat: moist mucous membranes. Chest: Effort normal & normal respiratory excursion . Neurological: alert, conversant and oriented . Skin: Skin is not cold. No obvious systemic rash noted. Not diaphoretic. No erythema. Psychiatric:  Grossly normal mood and affect. Behavior appears normal. Extremities:  no clubbing or cyanosis. Abdomen: non distended    Lungs:breath sounds normal. No stridor. distress, wheezes or Rales. Heart:1/6 ANDREA at the RUSB normal rate, regular rhythm, normal S1, S2, no rubs, clicks or gallops , PMI non displaced. Edema: Edema is none.   Lab Results   Component Value Date/Time    Cholesterol, total 160 07/08/2020 08:50 AM    HDL Cholesterol 41 07/08/2020 08:50 AM    LDL, calculated 77 07/08/2020 08:50 AM    Triglyceride 212 (H) 07/08/2020 08:50 AM    CHOL/HDL Ratio 4.3 06/22/2018 10:11 AM     Lab Results   Component Value Date/Time    Sodium 136 07/08/2020 08:50 AM Potassium 6.5 (H) 07/08/2020 08:50 AM    Chloride 102 07/08/2020 08:50 AM    CO2 21 07/08/2020 08:50 AM    Anion gap 7 07/18/2018 04:22 AM    Glucose 163 (H) 07/08/2020 08:50 AM    BUN 19 07/08/2020 08:50 AM    Creatinine 1.33 (H) 07/08/2020 08:50 AM    BUN/Creatinine ratio 14 07/08/2020 08:50 AM    GFR est AA 63 07/08/2020 08:50 AM    GFR est non-AA 55 (L) 07/08/2020 08:50 AM    Calcium 9.7 07/08/2020 08:50 AM      Wt Readings from Last 3 Encounters:   06/30/20 144 lb (65.3 kg)   11/21/19 141 lb (64 kg)   10/22/19 141 lb (64 kg)      BP Readings from Last 3 Encounters:   06/30/20 138/70   11/21/19 118/62   10/22/19 110/65      Current Outpatient Medications   Medication Sig    glipiZIDE (GLUCOTROL) 10 mg tablet Take 1 tablet by mouth twice daily    metFORMIN (GLUCOPHAGE) 1,000 mg tablet TAKE 1 TABLET BY MOUTH TWICE DAILY WITH MEALS    hydroCHLOROthiazide (HYDRODIURIL) 50 mg tablet TAKE 1 TABLET BY MOUTH ONCE DAILY AFTER BREAKFAST    candesartan (ATACAND) 32 mg tablet Take 1 tablet by mouth once daily    atorvastatin (LIPITOR) 20 mg tablet Take 1 tablet by mouth daily    metoprolol tartrate (LOPRESSOR) 100 mg IR tablet Take 1 tablet by mouth twice daily    Januvia 50 mg tablet Take 1 tablet by mouth once daily    bacitracin-polymyxin b (POLYSPORIN) ophthalmic ointment Apply over right upper eye lid two times    prednisoLONE acetate (PRED FORTE) 1 % ophthalmic suspension INSTILL 1 DROP INTO RIGHT EYE 4 TIMES DAILY    traZODone (DESYREL) 50 mg tablet TAKE 1/2 (ONE-HALF) TABLET BY MOUTH NIGHTLY    aspirin 81 mg chewable tablet Take 1 Tab by mouth daily.  cyanocobalamin (VITAMIN B12) 500 mcg tablet Take 1 Tab by mouth daily. No current facility-administered medications for this visit. Impression see above.       Written by Isauro Chaparro, as dictated by David Fontanez MD.

## 2020-09-14 NOTE — PATIENT INSTRUCTIONS
You will be scheduled for an echocardiogram and a lexiscan nuclear stress test in 6 months. We will call with results and see you back for an annual follow up. You will be scheduled for nuclear stress testing after your appointment today. Wear comfortable clothing (shorts or pants with a shirt or blouse- no underwire bras) and walking or athletic shoes. Do not eat or drink anything, except water, for at least 2 hours prior to your appointment. Avoid tobacco products for at least 6 hours prior to your test.    Do not eat or drink anything containing caffeine, including but not limited to the following: chocolate, regular and decaffeinated coffee, soft drinks, or tea for at least 12-24 hours prior to your test.    Do not hold your scheduled medications prior to your test.    Your test will be performed on a 1 day protocol. This is determined by your height, weight, and other risk factors. For a 2 day test, please allow for 2 hours in the office each day. For a 1 day test, please allow for 4 hours in the office that day. The radioactive isotope used for your testing is different from any of the dyes that are commonly used in x-ray procedures, and is ordered specially for your test. Please call to cancel or reschedule your appointment at least 24 hours prior to your scheduled appointment to avoid being billed for the expensive isotope.

## 2020-09-14 NOTE — PROGRESS NOTES
Visit Vitals  /60 (BP 1 Location: Left arm, BP Patient Position: Sitting)   Pulse 60   Resp 18   Ht 5' 3\" (1.6 m)   Wt 148 lb (67.1 kg)   SpO2 98%   BMI 26.22 kg/m²

## 2020-09-18 ENCOUNTER — TELEPHONE (OUTPATIENT)
Dept: CARDIOLOGY CLINIC | Age: 70
End: 2020-09-18

## 2020-09-18 DIAGNOSIS — I10 HYPERTENSION, ESSENTIAL: ICD-10-CM

## 2020-09-18 DIAGNOSIS — I25.10 CORONARY ARTERY DISEASE INVOLVING NATIVE CORONARY ARTERY OF NATIVE HEART WITHOUT ANGINA PECTORIS: ICD-10-CM

## 2020-09-18 NOTE — TELEPHONE ENCOUNTER
Pt's spouse requesting to speak with Dr. Freda Machado. Pt asked Dr. Freda Machado to give his spouse a call when he visited him in the office.  Please advise    [phone]  Phone:

## 2020-09-23 NOTE — TELEPHONE ENCOUNTER
Spoke to his wife  Since his surgery , gets tired with activity per wife  And having hearing issues, getting hearing aids  She wants to know if he can get disability, their cafe closed  I explained that would not be our determination  We will reduce metoprolol in half to 50 mg tabs, nurse to call new Rx  Update us in 2 weeks  He mentioned none of this  Current Outpatient Medications   Medication Sig    glipiZIDE (GLUCOTROL) 10 mg tablet Take 1 tablet by mouth twice daily    metFORMIN (GLUCOPHAGE) 1,000 mg tablet TAKE 1 TABLET BY MOUTH TWICE DAILY WITH MEALS    hydroCHLOROthiazide (HYDRODIURIL) 50 mg tablet TAKE 1 TABLET BY MOUTH ONCE DAILY AFTER BREAKFAST    candesartan (ATACAND) 32 mg tablet Take 1 tablet by mouth once daily    atorvastatin (LIPITOR) 20 mg tablet Take 1 tablet by mouth daily    metoprolol tartrate (LOPRESSOR) 100 mg IR tablet Take 1 tablet by mouth twice daily    Januvia 50 mg tablet Take 1 tablet by mouth once daily    bacitracin-polymyxin b (POLYSPORIN) ophthalmic ointment Apply over right upper eye lid two times    prednisoLONE acetate (PRED FORTE) 1 % ophthalmic suspension INSTILL 1 DROP INTO RIGHT EYE 4 TIMES DAILY    traZODone (DESYREL) 50 mg tablet TAKE 1/2 (ONE-HALF) TABLET BY MOUTH NIGHTLY    aspirin 81 mg chewable tablet Take 1 Tab by mouth daily.  cyanocobalamin (VITAMIN B12) 500 mcg tablet Take 1 Tab by mouth daily. No current facility-administered medications for this visit.

## 2020-09-24 RX ORDER — METOPROLOL TARTRATE 50 MG/1
50 TABLET ORAL 2 TIMES DAILY
Qty: 60 TAB | Refills: 3 | Status: SHIPPED | OUTPATIENT
Start: 2020-09-24 | End: 2021-06-08

## 2020-10-14 ENCOUNTER — OFFICE VISIT (OUTPATIENT)
Dept: FAMILY MEDICINE CLINIC | Age: 70
End: 2020-10-14
Payer: MEDICARE

## 2020-10-14 VITALS
TEMPERATURE: 97.3 F | HEIGHT: 63 IN | HEART RATE: 56 BPM | SYSTOLIC BLOOD PRESSURE: 112 MMHG | OXYGEN SATURATION: 96 % | RESPIRATION RATE: 16 BRPM | BODY MASS INDEX: 24.98 KG/M2 | DIASTOLIC BLOOD PRESSURE: 74 MMHG | WEIGHT: 141 LBS

## 2020-10-14 DIAGNOSIS — E78.5 DYSLIPIDEMIA, GOAL LDL BELOW 100: ICD-10-CM

## 2020-10-14 DIAGNOSIS — Z95.1 S/P CABG X 3: ICD-10-CM

## 2020-10-14 DIAGNOSIS — I35.0 NONRHEUMATIC AORTIC VALVE STENOSIS: ICD-10-CM

## 2020-10-14 DIAGNOSIS — E11.65 TYPE 2 DIABETES MELLITUS WITH HYPERGLYCEMIA, WITHOUT LONG-TERM CURRENT USE OF INSULIN (HCC): ICD-10-CM

## 2020-10-14 DIAGNOSIS — I10 HYPERTENSION, ESSENTIAL: ICD-10-CM

## 2020-10-14 DIAGNOSIS — Z01.818 PREOP GENERAL PHYSICAL EXAM: Primary | ICD-10-CM

## 2020-10-14 DIAGNOSIS — Z23 ENCOUNTER FOR IMMUNIZATION: ICD-10-CM

## 2020-10-14 PROCEDURE — 90653 IIV ADJUVANT VACCINE IM: CPT

## 2020-10-14 PROCEDURE — 99215 OFFICE O/P EST HI 40 MIN: CPT

## 2020-10-14 PROCEDURE — 3051F HG A1C>EQUAL 7.0%<8.0%: CPT

## 2020-10-14 RX ORDER — KETOROLAC TROMETHAMINE 5 MG/ML
SOLUTION OPHTHALMIC
COMMUNITY
Start: 2020-09-23 | End: 2021-05-20 | Stop reason: ALTCHOICE

## 2020-10-14 RX ORDER — TIMOLOL MALEATE 5 MG/ML
SOLUTION/ DROPS OPHTHALMIC
COMMUNITY
Start: 2020-09-23 | End: 2021-06-10

## 2020-10-14 RX ORDER — ASPIRIN 81 MG/1
1 TABLET ORAL
COMMUNITY

## 2020-10-14 RX ORDER — OFLOXACIN 3 MG/ML
SOLUTION/ DROPS OPHTHALMIC
COMMUNITY
Start: 2020-09-23 | End: 2021-01-20 | Stop reason: ALTCHOICE

## 2020-10-14 NOTE — PROGRESS NOTES
Chief Complaint   Patient presents with    Diabetes    Hypertension    Cholesterol Problem    Immunization/Injection     INFLUENZA HIGH DOSE      1. Have you been to the ER, urgent care clinic since your last visit? Hospitalized since your last visit? No    2. Have you seen or consulted any other health care providers outside of the [address] [place] [address] since your last visit? Include any pap smears or colon screening. No    Margie Mcnair  is a 72 y.o.  male  who present for INFLUENZA HIGH DOSE  immunizations/injections. He/she denies any symptoms , reactions or allergies that would exclude them from being immunized today. Risks and adverse reactions were discussed and the VIS was given if applicable to them. All questions were addressed. He/She was observed for 10 min post injection. There were no reactions observed.     Juli Jacob LPN

## 2020-10-14 NOTE — PROGRESS NOTES
HISTORY OF PRESENT ILLNESS  Shauna Chaparro is a 72 y.o. male. seen for follow up on DM,dyslipidemia, CAD, HTN along with preop clearance for upcoming eye surgery  HPI  Cardiovascular Review  The patient has hypertension, hyperlipidemia, coronary artery disease, status post coronary artery stenting and had 2 vessel PCI on 11/11/2016, s/p CABG x 3 on 07/09/2018. Inez Day reports taking medications as instructed, no medication side effects noted, notes stable dyspnea on exertion, no change, no swelling of ankles, no orthostatic dizziness or lightheadedness, no orthopnea or paroxysmal nocturnal dyspnea, no palpitations, no intermittent claudication symptoms.  Diet and Lifestyle: generally follows a low fat low cholesterol diet, generally follows a low sodium diet, nonsmoker.  Lab review: labs reviewed and discussed with patient.  Follows Jinny  Medicines:ASA, Metoprolol 50 mg bid, Candesartan 32 mg ( was on Losartan 100 mg) , Hctz 50 mg, Atorvastatin 20 mg    Lab Results   Component Value Date/Time    Cholesterol, total 160 07/08/2020 08:50 AM    HDL Cholesterol 41 07/08/2020 08:50 AM    LDL, calculated 77 07/08/2020 08:50 AM    VLDL, calculated 42 (H) 07/08/2020 08:50 AM    Triglyceride 212 (H) 07/08/2020 08:50 AM    CHOL/HDL Ratio 4.3 06/22/2018 10:11 AM          Endocrine Review  He is seen for diabetes.  Since last visit he reports: no polyuria or polydipsia, no chest pain, dyspnea or TIA's, no numbness, tingling or pain in extremities, no unusual visual symptoms, weight has decreased, no significant changes.  Home glucose monitoring: is performed sporadically, nonfasting values range 160-200  He is checking his sugars one per day.  He reports medication compliance: compliant all of the time.  Medication side effects: none.  Diabetic diet compliance: noncompliant some of the time.  Lab review: labs reviewed and discussed with patient.  Eye exam: UTD.    On  Glipizide 10 mg BID and Januvia 50 mg, Metformin 1 gm bid    Lab Results   Component Value Date/Time    Hemoglobin A1c 7.0 (H) 07/08/2020 08:50 AM        HPI:  Bebe Muse is 72 y.o. male (1950) who presents for preoperative evaluation. Procedure/Surgery: cataract extraction with lens impant   Date of Procedure/Surgery: 11/10/2020  Surgeon: [facility] Facility[facility]  Primary Physician: Fatou Siddiqui MD       Reason for surgery: cataract and worsening vision    Latex Allergy: no  Anesthesia Complications: None  History of abnormal bleeding : None  History of Blood Transfusions: no  Health Care Directive or Living Will: yes  Recent use of: ASA  Tetanus up to date: last tetanus booster within 10 years      EKG: Sinus  Rhythm   -Nonspecific ST depression   +   Negative T-waves. Labs:   Lab Results   Component Value Date/Time    Sodium 136 07/08/2020 08:50 AM    Potassium 6.5 (H) 07/08/2020 08:50 AM    Chloride 102 07/08/2020 08:50 AM    CO2 21 07/08/2020 08:50 AM    Anion gap 7 07/18/2018 04:22 AM    Glucose 163 (H) 07/08/2020 08:50 AM    BUN 19 07/08/2020 08:50 AM    Creatinine 1.33 (H) 07/08/2020 08:50 AM    BUN/Creatinine ratio 14 07/08/2020 08:50 AM    GFR est AA 63 07/08/2020 08:50 AM    GFR est non-AA 55 (L) 07/08/2020 08:50 AM    Calcium 9.7 07/08/2020 08:50 AM      Lab Results   Component Value Date/Time    Hemoglobin A1c 7.0 (H) 07/08/2020 08:50 AM          ---------------------------------------     Prior to Admission medications    Medication Sig Start Date End Date Taking? Authorizing Provider   timolol (TIMOPTIC) 0.5 % ophthalmic solution INSTILL 1 DROP INTO EACH EYE ONCE DAILY IN THE MORNING 9/23/20  Yes Provider, Historical   aspirin delayed-release 81 mg tablet Take 1 Tab by mouth. Yes Provider, Historical   metoprolol tartrate (LOPRESSOR) 50 mg tablet Take 1 Tab by mouth two (2) times a day.  9/24/20  Yes Anival Mendoza MD   glipiZIDE (GLUCOTROL) 10 mg tablet Take 1 tablet by mouth twice daily 9/11/20  Yes Dale Resendez MD   metFORMIN (GLUCOPHAGE) 1,000 mg tablet TAKE 1 TABLET BY MOUTH TWICE DAILY WITH MEALS 8/3/20  Yes Dale Resendez MD   hydroCHLOROthiazide (HYDRODIURIL) 50 mg tablet TAKE 1 TABLET BY MOUTH ONCE DAILY AFTER BREAKFAST 6/30/20  Yes Paul Fontanez MD   candesartan (ATACAND) 32 mg tablet Take 1 tablet by mouth once daily 6/30/20  Yes Paul Fontanez MD   atorvastatin (LIPITOR) 20 mg tablet Take 1 tablet by mouth daily 6/30/20  Yes Paul Fontanez MD   Januvia 50 mg tablet Take 1 tablet by mouth once daily 6/29/20  Yes Dale Resendez MD   bacitracin-polymyxin b (POLYSPORIN) ophthalmic ointment Apply over right upper eye lid two times 4/17/20  Yes Dale Resendez MD   traZODone (DESYREL) 50 mg tablet TAKE 1/2 (ONE-HALF) TABLET BY MOUTH NIGHTLY 9/24/19  Yes Dale Resendez MD   aspirin 81 mg chewable tablet Take 1 Tab by mouth daily. 7/18/18  Yes Keysha CASANOVA NP   cyanocobalamin (VITAMIN B12) 500 mcg tablet Take 1 Tab by mouth daily. 7/18/18  Yes Keysha CASANOVA NP   ketorolac (ACULAR) 0.5 % ophthalmic solution INSTILL 1 DROP 4 TIMES DAILY INTO SURGERY EYE. START 2 DAYS PRIOR TO SURGERY 9/23/20   Provider, Historical   ofloxacin (FLOXIN) 0.3 % ophthalmic solution INSTILL 1 DROP 4 TIMES DAILY INTO SURGERY EYE.  START 2 DAYS PRIOR TO SURGERY 9/23/20   Provider, Historical   prednisoLONE acetate (PRED FORTE) 1 % ophthalmic suspension INSTILL 1 DROP INTO RIGHT EYE 4 TIMES DAILY 10/31/19   Provider, Historical          No Known Allergies     Patient Active Problem List    Diagnosis Date Noted    Hypercholesteremia 07/30/2017     Priority: 2 - Two    Statin intolerance 07/30/2017     Priority: 5 - Five    S/P CABG x 3 07/09/2018    Coronary artery disease involving native coronary artery of native heart without angina pectoris 07/30/2017    Hypertension, essential 07/30/2017    Colon cancer screening 05/30/2017    Nonrheumatic aortic valve stenosis 09/21/2016  Bruit of right carotid artery 09/21/2016    Type 2 diabetes mellitus with hyperglycemia ([facility] [address] 05/26/2015    Deafness 10/28/2012    Cramp of limb 03/26/2012        Past Medical History:   Diagnosis Date    CAD (coronary artery disease) 11/10/2016    NSTEMI & 2 stents    Deafness 10/28/2012    DM (diabetes mellitus) ([facility] [address]     Elevated cholesterol     Hypertension     NSTEMI (non-ST elevated myocardial infarction) ([facility] [address] 11/10/2016       Past Surgical History:   Procedure Laterality Date    CARDIAC SURG PROCEDURE UNLIST  11/11/2016    2 stents    COLONOSCOPY N/A 6/28/2018    COLONOSCOPY performed by Duane Valdez MD at [address] [place] [address] ENDOSCOPY    HX APPENDECTOMY         Social History     Tobacco Use    Smoking status: Never Smoker    Smokeless tobacco: Never Used   Substance Use Topics    Alcohol use: Yes     Alcohol/week: 2.0 standard drinks     Types: 1 Cans of beer, 1 Shots of liquor per week     Comment: 1 DRINK DAILY         --------------------------------------    Review of Systems   Constitutional: Negative for chills, fever and malaise/fatigue. HENT: Negative for congestion, ear pain, sore throat and tinnitus. Eyes: Negative for blurred vision, double vision, pain and discharge. Respiratory: Negative for cough, shortness of breath and wheezing. Cardiovascular: Negative for chest pain, palpitations and leg swelling. Gastrointestinal: Negative for abdominal pain, blood in stool, constipation, diarrhea, nausea and vomiting. Genitourinary: Negative for dysuria, frequency, hematuria and urgency. Musculoskeletal: Negative for back pain, joint pain and myalgias. Skin: Negative for rash. Neurological: Negative for dizziness, tremors, seizures and headaches. Endo/Heme/Allergies: Negative for polydipsia. Does not bruise/bleed easily. Psychiatric/Behavioral: Negative for depression and substance abuse. The patient is not nervous/anxious.         Physical Exam  Vitals signs and nursing note reviewed. Constitutional:       Appearance: He is well-developed. He is not diaphoretic. HENT:      Head: Normocephalic and atraumatic. Right Ear: External ear normal.      Mouth/Throat:      Pharynx: No oropharyngeal exudate. Eyes:      General: No scleral icterus. Conjunctiva/sclera: Conjunctivae normal.      Pupils: Pupils are equal, round, and reactive to light. Neck:      Musculoskeletal: Normal range of motion and neck supple. Thyroid: No thyromegaly. Vascular: No JVD. Cardiovascular:      Rate and Rhythm: Normal rate and regular rhythm. Heart sounds: Normal heart sounds. No murmur. Pulmonary:      Effort: Pulmonary effort is normal.      Breath sounds: Normal breath sounds. No wheezing. Abdominal:      General: Bowel sounds are normal. There is no distension. Palpations: Abdomen is soft. There is no mass. Musculoskeletal: Normal range of motion. General: No tenderness. Lymphadenopathy:      Cervical: No cervical adenopathy. Skin:     General: Skin is warm and dry. Findings: No rash. Neurological:      Mental Status: He is alert and oriented to person, place, and time. Cranial Nerves: No cranial nerve deficit. Deep Tendon Reflexes: Reflexes are normal and symmetric. Reflexes normal.         ASSESSMENT and PLAN  Diagnoses and all orders for this visit:    1. Preop general physical exam  -     CBC WITH AUTOMATED DIFF    2. Type 2 diabetes mellitus with hyperglycemia, without long-term current use of insulin (HCC)  -     HEMOGLOBIN A1C WITH EAG  -     METABOLIC PANEL, COMPREHENSIVE    3. S/P CABG x 3  -     LIPID PANEL    4. Hypertension, essential  -     METABOLIC PANEL, COMPREHENSIVE    5. Nonrheumatic aortic valve stenosis  -     LIPID PANEL    6. Dyslipidemia, goal LDL below 100  -     LIPID PANEL    7.  Encounter for immunization  -     ADMIN INFLUENZA VIRUS VAC  -     INFLUENZA VIRUS VACCINE, HIGH DOSE SEASONAL, PRESERVATIVE FREE      After reviewing the patient's history & exam he is low risk for cardiac complications. No contraindications to planned surgery     Discussed lifestyle issues and health guidance given  Patient was given an after visit summary which includes diagnoses, vital signs, current medications, instructions and references & authorized prescriptions . Results of labs will be conveyed to patient, once available. Pt verbalized instructions I provided and expressed understanding of discussion that was held today. Follow-up and Dispositions    · Return in about 2 months (around 12/14/2020) for medicare wellness.

## 2020-10-14 NOTE — PATIENT INSTRUCTIONS
Learning About How to Prepare for Surgery  How can you prepare before surgery? You can do some things that will help you safely prepare for surgery. · Understand exactly what surgery is planned. You should know the risks, benefits, and other options. · Tell your doctors ALL the medicines, vitamins, supplements, and herbal remedies you take. Some of these can increase the risk of bleeding. Or they may interact with anesthesia. · Follow your doctor's instructions about which medicines to take or stop before your surgery. ? You may need to stop taking some medicines a week or more before surgery. ? If you take aspirin or some other blood thinner, be sure to talk to your doctor. · Follow any other instructions your doctor gave you. · If you have an advance directive, let your doctor know, and bring a copy to the hospital.   It may include a living will and a durable power of  for health care. It lets your doctor and loved ones know your health care wishes. If you don't have one, you may want to prepare one. How can you prepare on the day of surgery? Here are some tips about what to do at home before you leave for your surgery. · If your doctor told you to take your medicines on the day of surgery, take them with only a sip of water. · Follow the instructions about when to stop eating and drinking. If you don't, your surgery may be canceled. · Follow your doctor's instructions about when to bathe or shower before your surgery. · Do not shave the surgical site yourself. · Take off all jewelry and piercings. · Take out contact lenses, if you wear them. · Have a picture ID ready to take with you. Your ID will be checked before your surgery. · Know when to call your doctor. Call your doctor if you:  ? Become ill before surgery. ? Need to reschedule. ? Have changed your mind about having the surgery. What happens before surgery?   Here are some things you can expect to happen before your surgery. · Your picture ID will be checked. · The area of your body that needs surgery is often marked to make sure there are no errors. · You will be kept comfortable and safe by your anesthesia provider. The anesthesia may make you sleep. Or it may just numb the area being worked on. What happens when you are ready to go home? Be sure you have someone drive you home. Anesthesia and pain medicine make it unsafe for you to drive. You will get instructions about recovering from your surgery. This is called a discharge plan. It will cover things like diet, wound care, follow-up care, driving, and getting back to your normal routine. Follow-up care is a key part of your treatment and safety. Be sure to make and go to all appointments, and call your doctor if you are having problems. It's also a good idea to know your test results and keep a list of the medicines you take. Where can you learn more? Go to http://www.gray.com/  Enter Q270 in the search box to learn more about \"Learning About How to Prepare for Surgery. \"  Current as of: May 27, 2020               Content Version: 12.6  © 8621-4028 [employer]. Care instructions adapted under license by [employer] (which disclaims liability or warranty for this information). If you have questions about a medical condition or this instruction, always ask your healthcare professional. [address] any warranty or liability for your use of this information.

## 2020-10-15 LAB
ALBUMIN SERPL-MCNC: 4.6 G/DL (ref 3.8–4.8)
ALBUMIN/GLOB SERPL: 1.6 {RATIO} (ref 1.2–2.2)
ALP SERPL-CCNC: 98 IU/L (ref 39–117)
ALT SERPL-CCNC: 28 IU/L (ref 0–44)
AST SERPL-CCNC: 28 IU/L (ref 0–40)
BASOPHILS # BLD AUTO: 0.2 X10E3/UL (ref 0–0.2)
BASOPHILS NFR BLD AUTO: 2 %
BILIRUB SERPL-MCNC: 0.4 MG/DL (ref 0–1.2)
BUN SERPL-MCNC: 20 MG/DL (ref 8–27)
BUN/CREAT SERPL: 15 (ref 10–24)
CALCIUM SERPL-MCNC: 9.9 MG/DL (ref 8.6–10.2)
CHLORIDE SERPL-SCNC: 105 MMOL/L (ref 96–106)
CHOLEST SERPL-MCNC: 179 MG/DL (ref 100–199)
CO2 SERPL-SCNC: 22 MMOL/L (ref 20–29)
CREAT SERPL-MCNC: 1.3 MG/DL (ref 0.76–1.27)
EOSINOPHIL # BLD AUTO: 0.3 X10E3/UL (ref 0–0.4)
EOSINOPHIL NFR BLD AUTO: 4 %
ERYTHROCYTE [DISTWIDTH] IN BLOOD BY AUTOMATED COUNT: 12.7 % (ref 11.6–15.4)
EST. AVERAGE GLUCOSE BLD GHB EST-MCNC: 140 MG/DL
GLOBULIN SER CALC-MCNC: 2.9 G/DL (ref 1.5–4.5)
GLUCOSE SERPL-MCNC: 116 MG/DL (ref 65–99)
HBA1C MFR BLD: 6.5 % (ref 4.8–5.6)
HCT VFR BLD AUTO: 41.5 % (ref 37.5–51)
HDLC SERPL-MCNC: 42 MG/DL
HGB BLD-MCNC: 13.5 G/DL (ref 13–17.7)
IMM GRANULOCYTES # BLD AUTO: 0 X10E3/UL (ref 0–0.1)
IMM GRANULOCYTES NFR BLD AUTO: 1 %
INTERPRETATION, 910389: NORMAL
INTERPRETATION: NORMAL
LDLC SERPL CALC-MCNC: 100 MG/DL (ref 0–99)
LYMPHOCYTES # BLD AUTO: 1.6 X10E3/UL (ref 0.7–3.1)
LYMPHOCYTES NFR BLD AUTO: 25 %
MCH RBC QN AUTO: 29.6 PG (ref 26.6–33)
MCHC RBC AUTO-ENTMCNC: 32.5 G/DL (ref 31.5–35.7)
MCV RBC AUTO: 91 FL (ref 79–97)
MONOCYTES # BLD AUTO: 0.6 X10E3/UL (ref 0.1–0.9)
MONOCYTES NFR BLD AUTO: 9 %
NEUTROPHILS # BLD AUTO: 3.9 X10E3/UL (ref 1.4–7)
NEUTROPHILS NFR BLD AUTO: 59 %
PDF IMAGE, 910387: NORMAL
PLATELET # BLD AUTO: 199 X10E3/UL (ref 150–450)
POTASSIUM SERPL-SCNC: 6.6 MMOL/L (ref 3.5–5.2)
PROT SERPL-MCNC: 7.5 G/DL (ref 6–8.5)
RBC # BLD AUTO: 4.56 X10E6/UL (ref 4.14–5.8)
SODIUM SERPL-SCNC: 139 MMOL/L (ref 134–144)
TRIGL SERPL-MCNC: 215 MG/DL (ref 0–149)
VLDLC SERPL CALC-MCNC: 37 MG/DL (ref 5–40)
WBC # BLD AUTO: 6.5 X10E3/UL (ref 3.4–10.8)

## 2020-10-16 NOTE — PROGRESS NOTES
Ksenia Gilliam,  I have reviewed your results  A1C shows persistent improvement 6.5  Cholesterol results are normal except elevated Triglycerides, just watch diet for fried food  Blood count is normal  Kidney function is persistently borderline abnormal, though stable. Potassium level stays high, can be from your diabetes vs hemolysis of sample vs kidney abnormality  Just stay well hydrated as we discussed  I have completed preop clearance and faxed to your surgeon's office  Let me know if you have any questions.   thanks

## 2020-11-13 DIAGNOSIS — E78.5 DYSLIPIDEMIA: ICD-10-CM

## 2020-11-19 RX ORDER — ATORVASTATIN CALCIUM 20 MG/1
20 TABLET, FILM COATED ORAL
Qty: 90 TAB | Refills: 3 | Status: SHIPPED | OUTPATIENT
Start: 2020-11-19 | End: 2021-10-07

## 2020-11-19 NOTE — TELEPHONE ENCOUNTER
Request for Lipitor 20mg QHS. Last office visit 9-14-20, next office visit 9-13-21.  Refills per verbal order from Dr. Shari Solorio.

## 2020-12-08 DIAGNOSIS — Z95.1 S/P CABG X 3: ICD-10-CM

## 2020-12-08 DIAGNOSIS — I10 ESSENTIAL HYPERTENSION WITH GOAL BLOOD PRESSURE LESS THAN 130/85: ICD-10-CM

## 2020-12-08 RX ORDER — HYDROCHLOROTHIAZIDE 50 MG/1
TABLET ORAL
Qty: 90 TAB | Refills: 2 | Status: SHIPPED | OUTPATIENT
Start: 2020-12-08 | End: 2021-05-20 | Stop reason: DRUGHIGH

## 2020-12-16 DIAGNOSIS — I10 ESSENTIAL HYPERTENSION: ICD-10-CM

## 2020-12-18 RX ORDER — CANDESARTAN 32 MG/1
32 TABLET ORAL DAILY
Qty: 90 TAB | Refills: 3 | Status: SHIPPED | OUTPATIENT
Start: 2020-12-18 | End: 2021-11-22

## 2021-01-20 ENCOUNTER — OFFICE VISIT (OUTPATIENT)
Dept: FAMILY MEDICINE CLINIC | Age: 71
End: 2021-01-20
Payer: MEDICARE

## 2021-01-20 VITALS
BODY MASS INDEX: 24.8 KG/M2 | HEART RATE: 62 BPM | OXYGEN SATURATION: 93 % | RESPIRATION RATE: 16 BRPM | SYSTOLIC BLOOD PRESSURE: 128 MMHG | DIASTOLIC BLOOD PRESSURE: 82 MMHG | WEIGHT: 140 LBS | TEMPERATURE: 97.3 F | HEIGHT: 63 IN

## 2021-01-20 DIAGNOSIS — Z71.89 ADVANCED DIRECTIVES, COUNSELING/DISCUSSION: ICD-10-CM

## 2021-01-20 DIAGNOSIS — Z95.1 S/P CABG X 3: ICD-10-CM

## 2021-01-20 DIAGNOSIS — E78.00 HYPERCHOLESTEREMIA: ICD-10-CM

## 2021-01-20 DIAGNOSIS — E11.65 TYPE 2 DIABETES MELLITUS WITH HYPERGLYCEMIA, WITHOUT LONG-TERM CURRENT USE OF INSULIN (HCC): ICD-10-CM

## 2021-01-20 DIAGNOSIS — Z12.5 SPECIAL SCREENING FOR MALIGNANT NEOPLASM OF PROSTATE: ICD-10-CM

## 2021-01-20 DIAGNOSIS — I10 HYPERTENSION, ESSENTIAL: ICD-10-CM

## 2021-01-20 DIAGNOSIS — Z00.00 MEDICARE ANNUAL WELLNESS VISIT, SUBSEQUENT: Primary | ICD-10-CM

## 2021-01-20 DIAGNOSIS — Z13.31 SCREENING FOR DEPRESSION: ICD-10-CM

## 2021-01-20 PROCEDURE — 1101F PT FALLS ASSESS-DOCD LE1/YR: CPT | Performed by: FAMILY MEDICINE

## 2021-01-20 PROCEDURE — 2022F DILAT RTA XM EVC RTNOPTHY: CPT | Performed by: FAMILY MEDICINE

## 2021-01-20 PROCEDURE — G8420 CALC BMI NORM PARAMETERS: HCPCS | Performed by: FAMILY MEDICINE

## 2021-01-20 PROCEDURE — 99214 OFFICE O/P EST MOD 30 MIN: CPT | Performed by: FAMILY MEDICINE

## 2021-01-20 PROCEDURE — 3017F COLORECTAL CA SCREEN DOC REV: CPT | Performed by: FAMILY MEDICINE

## 2021-01-20 PROCEDURE — 99497 ADVNCD CARE PLAN 30 MIN: CPT | Performed by: FAMILY MEDICINE

## 2021-01-20 PROCEDURE — G8536 NO DOC ELDER MAL SCRN: HCPCS | Performed by: FAMILY MEDICINE

## 2021-01-20 PROCEDURE — G8427 DOCREV CUR MEDS BY ELIG CLIN: HCPCS | Performed by: FAMILY MEDICINE

## 2021-01-20 PROCEDURE — G8752 SYS BP LESS 140: HCPCS | Performed by: FAMILY MEDICINE

## 2021-01-20 PROCEDURE — 3046F HEMOGLOBIN A1C LEVEL >9.0%: CPT | Performed by: FAMILY MEDICINE

## 2021-01-20 PROCEDURE — G8510 SCR DEP NEG, NO PLAN REQD: HCPCS | Performed by: FAMILY MEDICINE

## 2021-01-20 PROCEDURE — G8754 DIAS BP LESS 90: HCPCS | Performed by: FAMILY MEDICINE

## 2021-01-20 PROCEDURE — G0439 PPPS, SUBSEQ VISIT: HCPCS | Performed by: FAMILY MEDICINE

## 2021-01-20 NOTE — PROGRESS NOTES
HISTORY OF PRESENT ILLNESS  [facility] 72 [facility]. seen for follow up on chronic medical conditions along with his wellness exam  HPI  Cardiovascular Review  The patient has hypertension, hyperlipidemia, coronary artery disease, status post coronary artery stenting and had 2 vessel PCI on 11/11/2016, s/p CABG x 3 on 07/09/2018. Flor Zamarripa reports taking medications as instructed, no medication side effects noted, notes stable dyspnea on exertion, no change, no swelling of ankles, no orthostatic dizziness or lightheadedness, no orthopnea or paroxysmal nocturnal dyspnea, no palpitations, no intermittent claudication symptoms.  Diet and Lifestyle: generally follows a low fat low cholesterol diet, generally follows a low sodium diet, nonsmoker.  Lab review: labs reviewed and discussed with patient.  Follows Jinny  Medicines:ASA, Metoprolol 50 mg bid, Candesartan 32 mg ( was on Losartan 100 mg) , Hctz 50 mg, Atorvastatin 20 mg     Endocrine Review  He is seen for diabetes.  Since last visit he reports: no polyuria or polydipsia, no chest pain, dyspnea or TIA's, no numbness, tingling or pain in extremities, no unusual visual symptoms, weight has decreased, no significant changes.  Home glucose monitoring: is performed sporadically, nonfasting values range 160-200  He is checking his sugars one per day.  He reports medication compliance: compliant all of the time.  Medication side effects: none.  Diabetic diet compliance: noncompliant some of the time.  Lab review: labs reviewed and discussed with patient.  Eye exam: UTD.    On  Glipizide 10 mg BID and Januvia 50 mg, Metformin 1 gm bid    Review of Systems   Constitutional: Negative for chills, fever and malaise/fatigue. HENT: Negative for congestion, ear pain, sore throat and tinnitus. Eyes: Negative for blurred vision, double vision, pain and discharge. Respiratory: Negative for cough, shortness of breath and wheezing.     Cardiovascular: Negative for chest pain, palpitations and leg swelling. Gastrointestinal: Negative for abdominal pain, blood in stool, constipation, diarrhea, nausea and vomiting. Genitourinary: Negative for dysuria, frequency, hematuria and urgency. Musculoskeletal: Negative for back pain, joint pain and myalgias. Skin: Negative for rash. Neurological: Negative for dizziness, tremors, seizures and headaches. Endo/Heme/Allergies: Negative for polydipsia. Does not bruise/bleed easily. Psychiatric/Behavioral: Negative for depression and substance abuse. The patient is not nervous/anxious. Physical Exam  Vitals signs and nursing note reviewed. Constitutional:       Appearance: He is well-developed. He is not diaphoretic. HENT:      Head: Normocephalic and atraumatic. Right Ear: External ear normal.      Mouth/Throat:      Pharynx: No oropharyngeal exudate. Eyes:      General: No scleral icterus. Conjunctiva/sclera: Conjunctivae normal.      Pupils: Pupils are equal, round, and reactive to light. Neck:      Musculoskeletal: Normal range of motion and neck supple. Thyroid: No thyromegaly. Vascular: No JVD. Cardiovascular:      Rate and Rhythm: Normal rate and regular rhythm. Heart sounds: Normal heart sounds. No murmur. Pulmonary:      Effort: Pulmonary effort is normal.      Breath sounds: Normal breath sounds. No wheezing. Abdominal:      General: Bowel sounds are normal. There is no distension. Palpations: Abdomen is soft. There is no mass. Musculoskeletal: Normal range of motion. General: No tenderness. Lymphadenopathy:      Cervical: No cervical adenopathy. Skin:     General: Skin is warm and dry. Findings: No rash. Neurological:      Mental Status: He is alert and oriented to person, place, and time. Cranial Nerves: No cranial nerve deficit. Deep Tendon Reflexes: Reflexes are normal and symmetric.  Reflexes normal.         ASSESSMENT and PLAN  Diagnoses and all orders for this visit:    1. Medicare annual wellness visit, subsequent  -     CBC WITH AUTOMATED DIFF; Future  -     URINALYSIS W/ RFLX MICROSCOPIC; Future    2. Type 2 diabetes mellitus with hyperglycemia, without long-term current use of insulin ([facility] [address]  Assessment & Plan:  Well Controlled, based on history, physical exam and review of pertinent labs, studies and medications; meds reconciled; continue current treatment plan, lifestyle modifications recommended, medication compliance emphasized. Orders:  -     MICROALBUMIN, UR, RAND W/ MICROALB/CREAT RATIO; Future  -     HEMOGLOBIN A1C WITH EAG; Future  -     METABOLIC PANEL, COMPREHENSIVE; Future    3. S/P CABG x 3  -     LIPID PANEL; Future  -     METABOLIC PANEL, COMPREHENSIVE; Future    4. Hypertension, essential  -     METABOLIC PANEL, COMPREHENSIVE; Future    5. Hypercholesteremia  -     LIPID PANEL; Future  -     METABOLIC PANEL, COMPREHENSIVE; Future    6. Advanced directives, counseling/discussion  -     ADVANCE CARE PLANNING FIRST 30 MINS    7. Screening for depression  -     DEPRESSION SCREEN ANNUAL    8. Special screening for malignant neoplasm of prostate  -     PSA SCREENING (SCREENING); Future    Discussed lifestyle issues and health guidance given  Patient was given an after visit summary which includes diagnoses, vital signs, current medications, instructions and references & authorized prescriptions . Results of labs will be conveyed to patient, once available. Pt verbalized instructions I provided and expressed understanding of discussion that was held today. Follow-up and Dispositions    · Return in about 4 months (around 5/20/2021) for follow up, fasting.

## 2021-01-20 NOTE — PROGRESS NOTES
Chief Complaint   Patient presents with    Annual Wellness Visit    Diabetes     follow up     Cholesterol Problem    Hypertension     1. Have you been to the ER, urgent care clinic since your last visit? Hospitalized since your last visit? No    2. Have you seen or consulted any other health care providers outside of the [address] [place] [address] since your last visit? Include any pap smears or colon screening.  No

## 2021-01-20 NOTE — PATIENT INSTRUCTIONS
Medicare Wellness Visit, Male    The best way to live healthy is to have a lifestyle where you eat a well-balanced diet, exercise regularly, limit alcohol use, and quit all forms of tobacco/nicotine, if applicable. Regular preventive services are another way to keep healthy. Preventive services (vaccines, screening tests, monitoring & exams) can help personalize your care plan, which helps you manage your own care. Screening tests can find health problems at the earliest stages, when they are easiest to treat. [facility] follows the current, evidence-based guidelines published by the [facility] ([facility]) when recommending preventive services for our patients. Because we follow these guidelines, sometimes recommendations change over time as research supports it. (For example, a prostate screening blood test is no longer routinely recommended for men with no symptoms). Of course, you and your doctor may decide to screen more often for some diseases, based on your risk and co-morbidities (chronic disease you are already diagnosed with). Preventive services for you include:  - Medicare offers their members a free annual wellness visit, which is time for you and your primary care provider to discuss and plan for your preventive service needs. Take advantage of this benefit every year!  -All adults over age 72 should receive the recommended pneumonia vaccines. Current USPSTF guidelines recommend a series of two vaccines for the best pneumonia protection.   -All adults should have a flu vaccine yearly and tetanus vaccine every 10 years.  -All adults age 48 and older should receive the shingles vaccines (series of two vaccines).        -All adults age 38-68 who are overweight should have a diabetes screening test once every three years.   -Other screening tests & preventive services for persons with diabetes include: an eye exam to screen for diabetic retinopathy, a kidney function test, a foot exam, and stricter control over your cholesterol.   -Cardiovascular screening for adults with routine risk involves an electrocardiogram (ECG) at intervals determined by the provider.   -Colorectal cancer screening should be done for adults age 54-65 with no increased risk factors for colorectal cancer. There are a number of acceptable methods of screening for this type of cancer. Each test has its own benefits and drawbacks. Discuss with your provider what is most appropriate for you during your annual wellness visit. The different tests include: colonoscopy (considered the best screening method), a fecal occult blood test, a fecal DNA test, and sigmoidoscopy.  -All adults born between [facility] should be screened once for Hepatitis C.  -An Abdominal Aortic Aneurysm (AAA) Screening is recommended for men age 73-68 who has ever smoked in their lifetime. Here is a list of your current Health Maintenance items (your personalized list of preventive services) with a due date:  Health Maintenance Due   Topic Date Due    COVID-19 Vaccine (1 of 2) 10/05/1966          Learning About Jah Albarran  What is a living will? A living will is a legal form you use to write down the kind of care you want at the end of your life. It is used by the health professionals who will treat you if you aren't able to decide for yourself. If you put your wishes in writing, your loved ones and others will know what kind of care you want. They won't need to guess. This can ease your mind and be helpful to others. A living will is not the same as an estate or property will. An estate will explains what you want to happen with your money and property after you die. Is a living will a legal document? A living will is a legal document. Each state has its own laws about living mireles. If you move to another state, make sure that your living will is legal in the state where you now live.  Or you might use a universal form that has been approved by many states. This kind of form can sometimes be completed and stored online. Your electronic copy will then be available wherever you have a connection to the Internet. In most cases, doctors will respect your wishes even if you have a form from a different state. · You don't need an  to complete a living will. But legal advice can be helpful if your state's laws are unclear, your health history is complicated, or your family can't agree on what should be in your living will. · You can change your living will at any time. Some people find that their wishes about end-of-life care change as their health changes. · In addition to making a living will, think about completing a medical power of  form. This form lets you name the person you want to make end-of-life treatment decisions for you (your \"health care agent\") if you're not able to. Many hospitals and nursing homes will give you the forms you need to complete a living will and a medical power of . · Your living will is used only if you can't make or communicate decisions for yourself anymore. If you become able to make decisions again, you can accept or refuse any treatment, no matter what you wrote in your living will. · Your state may offer an online registry. This is a place where you can store your living will online so the doctors and nurses who need to treat you can find it right away. What should you think about when creating a living will? Talk about your end-of-life wishes with your family members and your doctor. Let them know what you want. That way the people making decisions for you won't be surprised by your choices. Think about these questions as you make your living will:  · Do you know enough about life support methods that might be used?  If not, talk to your doctor so you know what might be done if you can't breathe on your own, your heart stops, or you're unable to swallow. · What things would you still want to be able to do after you receive life-support methods? Would you want to be able to walk? To speak? To eat on your own? To live without the help of machines? · If you have a choice, where do you want to be cared for? In your home? At a hospital or nursing home? · Do you want certain Holiness practices performed if you become very ill? · If you have a choice at the end of your life, where would you prefer to die? At home? In a hospital or nursing home? Somewhere else? · Would you prefer to be buried or cremated? · Do you want your organs to be donated after you die? What should you do with your living will? · Make sure that your family members and your health care agent have copies of your living will. · Give your doctor a copy of your living will to keep in your medical record. If you have more than one doctor, make sure that each one has a copy. · You may want to put a copy of your living will where it can be easily found. Where can you learn more? Go to http://www.gray.com/. Enter N696 in the search box to learn more about \"Learning About Living Jay Albarran. \"  Current as of: August 8, 2016  Content Version: 11.3  © 7896-3359 [employer]. Care instructions adapted under license by [employer] (which disclaims liability or warranty for this information). If you have questions about a medical condition or this instruction, always ask your healthcare professional. [place] [address] any warranty or liability for your use of this information.

## 2021-01-20 NOTE — ASSESSMENT & PLAN NOTE
Well Controlled, based on history, physical exam and review of pertinent labs, studies and medications; meds reconciled; continue current treatment plan, lifestyle modifications recommended, medication compliance emphasized.

## 2021-01-20 NOTE — PROGRESS NOTES
This is the Subsequent Medicare Annual Wellness Exam, performed 12 months or more after the Initial AWV or the last Subsequent AWV    I have reviewed the patient's medical history in detail and updated the computerized patient record. We did a Medicare Wellness evaluation including a review of past, family, and social histories with attention to age/sex appropriate screening, risk assessment, preventive care and counseling. Any cognitive, fall risk, or ADL issues identified are addressed separately. A patient specific preventive care plan was provided and appropriate screening tests were ordered as specified. Depression Risk Factor Screening:     3 most recent PHQ Screens 1/20/2021   Little interest or pleasure in doing things Not at all   Feeling down, depressed, irritable, or hopeless Not at all   Total Score PHQ 2 0       Alcohol Risk Screen    Do you average more than 1 drink per night or more than 7 drinks a week: No    In the past three months have you have had more than 4 drinks containing alcohol on one occasion: No        Functional Ability and Level of Safety:    Hearing: The patient wears hearing aids. Activities of Daily Living: The home contains: no safety equipment. Patient does total self care      Ambulation: with no difficulty     Fall Risk:  Fall Risk Assessment, last 12 mths 1/20/2021   Able to walk? Yes   Fall in past 12 months? 0   Do you feel unsteady? 0   Are you worried about falling 0      Abuse Screen:  Patient is not abused       Cognitive Screening    Has your family/caregiver stated any concerns about your memory: no     Cognitive Screening: Normal - MMSE (Mini Mental Status Exam)  Oriented x 5 to time,place and person, but missed 1/3 word recalls and 1/5 WORLD spell back  Assessment/Plan   Education and counseling provided:  Are appropriate based on today's review and evaluation    Diagnoses and all orders for this visit:    1.  Medicare annual wellness visit, subsequent  - CBC WITH AUTOMATED DIFF; Future  -     URINALYSIS W/ RFLX MICROSCOPIC; Future    2. Type 2 diabetes mellitus with hyperglycemia, without long-term current use of insulin ([facility]  Assessment & Plan:  Well Controlled, based on history, physical exam and review of pertinent labs, studies and medications; meds reconciled; continue current treatment plan, lifestyle modifications recommended, medication compliance emphasized. Orders:  -     MICROALBUMIN, UR, RAND W/ MICROALB/CREAT RATIO; Future  -     HEMOGLOBIN A1C WITH EAG; Future  -     METABOLIC PANEL, COMPREHENSIVE; Future    3. S/P CABG x 3  -     LIPID PANEL; Future  -     METABOLIC PANEL, COMPREHENSIVE; Future    4. Hypertension, essential  -     METABOLIC PANEL, COMPREHENSIVE; Future    5. Hypercholesteremia  -     LIPID PANEL; Future  -     METABOLIC PANEL, COMPREHENSIVE; Future    6. Advanced directives, counseling/discussion  -     ADVANCE CARE PLANNING FIRST 30 MINS    7. Screening for depression  -     DEPRESSION SCREEN ANNUAL    8. Special screening for malignant neoplasm of prostate  -     PSA SCREENING (SCREENING);  Future      Health Maintenance Due     Health Maintenance Due   Topic Date Due    COVID-19 Vaccine (1 of 2) 10/05/1966       Patient Care Team   Patient Care Team:  Vivi Gray MD as PCP - General (Family Medicine)  Vivi Gray MD as PCP - [address] [place] [address] Provider  Fay Sheridan MD (Cardiology)  Willie Watkins MD (Gastroenterology)  Chely Espinoza MD (Cardiothoracic Surgery)    History     Patient Active Problem List   Diagnosis Code    Cramp of limb R25.2    Deafness H91.90    Type 2 diabetes mellitus with hyperglycemia ([facility] E11.65    Nonrheumatic aortic valve stenosis I35.0    Bruit of right carotid artery R09.89    Colon cancer screening Z12.11    Coronary artery disease involving native coronary artery of native heart without angina pectoris I25.10    Hypercholesteremia E78.00    Hypertension, essential I10  Statin intolerance Z78.9    S/P CABG x 3 Z95.1     Past Medical History:   Diagnosis Date    CAD (coronary artery disease) 11/10/2016    NSTEMI & 2 stents    Deafness 10/28/2012    DM (diabetes mellitus) ([facility]     Elevated cholesterol     Hypertension     NSTEMI (non-ST elevated myocardial infarction) ([facility] 11/10/2016      Past Surgical History:   Procedure Laterality Date    COLONOSCOPY N/A 6/28/2018    COLONOSCOPY performed by Zina Martinez MD at . [facility]  11/11/2016    2 stents     Current Outpatient Medications   Medication Sig Dispense Refill    Januvia 50 mg tablet Take 1 tablet by mouth once daily 30 Tab 5    metFORMIN (GLUCOPHAGE) 1,000 mg tablet TAKE 1 TABLET BY MOUTH TWICE DAILY WITH MEALS 180 Tab 0    candesartan (ATACAND) 32 mg tablet Take 1 Tab by mouth daily. 90 Tab 3    hydroCHLOROthiazide (HYDRODIURIL) 50 mg tablet TAKE 1 TABLET BY MOUTH ONCE DAILY AFTER BREAKFAST 90 Tab 2    atorvastatin (LIPITOR) 20 mg tablet Take 1 Tab by mouth nightly. 90 Tab 3    timolol (TIMOPTIC) 0.5 % ophthalmic solution INSTILL 1 DROP INTO EACH EYE ONCE DAILY IN THE MORNING      aspirin delayed-release 81 mg tablet Take 1 Tab by mouth.  metoprolol tartrate (LOPRESSOR) 50 mg tablet Take 1 Tab by mouth two (2) times a day. 60 Tab 3    glipiZIDE (GLUCOTROL) 10 mg tablet Take 1 tablet by mouth twice daily 60 Tab 4    bacitracin-polymyxin b (POLYSPORIN) ophthalmic ointment Apply over right upper eye lid two times 1 Tube 1    traZODone (DESYREL) 50 mg tablet TAKE 1/2 (ONE-HALF) TABLET BY MOUTH NIGHTLY 45 Tab 1    cyanocobalamin (VITAMIN B12) 500 mcg tablet Take 1 Tab by mouth daily. 30 Tab 1    ketorolac (ACULAR) 0.5 % ophthalmic solution INSTILL 1 DROP 4 TIMES DAILY INTO SURGERY EYE.  START 2 DAYS PRIOR TO SURGERY       No Known Allergies    Family History   Problem Relation Age of Onset    Heart Disease Father     Heart Attack Father  Hypertension Mother    Gianluca Verdin Elevated Lipids Brother     Elevated Lipids Brother     No Known Problems Sister     Elevated Lipids Brother     No Known Problems Son     No Known Problems Daughter     Anesth Problems Neg Hx      Social History     Tobacco Use    Smoking status: Never Smoker    Smokeless tobacco: Never Used   Substance Use Topics    Alcohol use: Yes     Alcohol/week: 2.0 standard drinks     Types: 1 Cans of beer, 1 Shots of liquor per week     Comment: 1 DRINK DAILY   Discussed lifestyle issues and health guidance given  Patient was given an after visit summary which includes diagnoses, vital signs, current medications, instructions and references & authorized prescriptions . Results of labs will be conveyed to patient, once available. Pt verbalized instructions I provided and expressed understanding of discussion that was held today. Follow-up and Dispositions    · Return in about 4 months (around 5/20/2021) for follow up, fasting.

## 2021-01-21 DIAGNOSIS — E11.22 CKD STAGE 3 DUE TO TYPE 2 DIABETES MELLITUS (HCC): Primary | ICD-10-CM

## 2021-01-21 DIAGNOSIS — N18.30 CKD STAGE 3 DUE TO TYPE 2 DIABETES MELLITUS (HCC): Primary | ICD-10-CM

## 2021-01-21 NOTE — ASSESSMENT & PLAN NOTE
Worsening, based on history, physical exam and review of pertinent labs, studies and medications; meds reconciled; changes to treatment plan as per orders, medication compliance emphasized.

## 2021-03-04 RX ORDER — GLIPIZIDE 10 MG/1
10 TABLET ORAL 2 TIMES DAILY
Qty: 180 TAB | Refills: 1 | Status: SHIPPED | OUTPATIENT
Start: 2021-03-04 | End: 2021-05-20 | Stop reason: DRUGHIGH

## 2021-03-04 NOTE — TELEPHONE ENCOUNTER
Last visit 01/20/2021 MD Chani Rausch   Next appointment 05/20/2021 MD Chani Rausch   Previous refill encounter(s)   09/11/2020 Glucotrol #60 with 4 refills     Requested Prescriptions     Pending Prescriptions Disp Refills    glipiZIDE (GLUCOTROL) 10 mg tablet [Pharmacy Med Name: glipiZIDE 10 MG Oral Tablet] 180 Tab 1     Sig: Take 1 Tab by mouth two (2) times a day.

## 2021-03-15 ENCOUNTER — ANCILLARY PROCEDURE (OUTPATIENT)
Dept: CARDIOLOGY CLINIC | Age: 71
End: 2021-03-15

## 2021-03-15 ENCOUNTER — ANCILLARY PROCEDURE (OUTPATIENT)
Dept: CARDIOLOGY CLINIC | Age: 71
End: 2021-03-15
Payer: MEDICARE

## 2021-03-15 VITALS
WEIGHT: 140 LBS | HEIGHT: 63 IN | SYSTOLIC BLOOD PRESSURE: 118 MMHG | BODY MASS INDEX: 24.8 KG/M2 | DIASTOLIC BLOOD PRESSURE: 78 MMHG

## 2021-03-15 VITALS
WEIGHT: 140 LBS | SYSTOLIC BLOOD PRESSURE: 130 MMHG | DIASTOLIC BLOOD PRESSURE: 80 MMHG | BODY MASS INDEX: 24.8 KG/M2 | HEIGHT: 63 IN

## 2021-03-15 DIAGNOSIS — I35.0 NONRHEUMATIC AORTIC (VALVE) STENOSIS: ICD-10-CM

## 2021-03-15 DIAGNOSIS — I10 HYPERTENSION, ESSENTIAL: ICD-10-CM

## 2021-03-15 DIAGNOSIS — I25.10 CORONARY ARTERY DISEASE INVOLVING NATIVE CORONARY ARTERY OF NATIVE HEART WITHOUT ANGINA PECTORIS: ICD-10-CM

## 2021-03-15 DIAGNOSIS — E11.65 TYPE 2 DIABETES MELLITUS WITH HYPERGLYCEMIA, WITHOUT LONG-TERM CURRENT USE OF INSULIN (HCC): ICD-10-CM

## 2021-03-15 DIAGNOSIS — E78.00 HYPERCHOLESTEREMIA: ICD-10-CM

## 2021-03-15 DIAGNOSIS — Z95.1 S/P CABG X 3: ICD-10-CM

## 2021-03-15 PROCEDURE — 78452 HT MUSCLE IMAGE SPECT MULT: CPT | Performed by: SPECIALIST

## 2021-03-15 PROCEDURE — 93015 CV STRESS TEST SUPVJ I&R: CPT | Performed by: SPECIALIST

## 2021-03-15 PROCEDURE — 93306 TTE W/DOPPLER COMPLETE: CPT | Performed by: SPECIALIST

## 2021-03-15 PROCEDURE — A9500 TC99M SESTAMIBI: HCPCS | Performed by: SPECIALIST

## 2021-03-15 RX ORDER — TETRAKIS(2-METHOXYISOBUTYLISOCYANIDE)COPPER(I) TETRAFLUOROBORATE 1 MG/ML
10 INJECTION, POWDER, LYOPHILIZED, FOR SOLUTION INTRAVENOUS ONCE
Status: COMPLETED | OUTPATIENT
Start: 2021-03-15 | End: 2021-03-15

## 2021-03-15 RX ORDER — TETRAKIS(2-METHOXYISOBUTYLISOCYANIDE)COPPER(I) TETRAFLUOROBORATE 1 MG/ML
30 INJECTION, POWDER, LYOPHILIZED, FOR SOLUTION INTRAVENOUS ONCE
Status: COMPLETED | OUTPATIENT
Start: 2021-03-15 | End: 2021-03-15

## 2021-03-15 RX ADMIN — TETRAKIS(2-METHOXYISOBUTYLISOCYANIDE)COPPER(I) TETRAFLUOROBORATE 26.3 MILLICURIE: 1 INJECTION, POWDER, LYOPHILIZED, FOR SOLUTION INTRAVENOUS at 09:50

## 2021-03-15 RX ADMIN — TETRAKIS(2-METHOXYISOBUTYLISOCYANIDE)COPPER(I) TETRAFLUOROBORATE 8.3 MILLICURIE: 1 INJECTION, POWDER, LYOPHILIZED, FOR SOLUTION INTRAVENOUS at 08:20

## 2021-03-18 LAB
STRESS BASELINE DIAS BP: 78 MMHG
STRESS BASELINE HR: 57 BPM
STRESS BASELINE SYS BP: 118 MMHG
STRESS O2 SAT PEAK: 100 %
STRESS O2 SAT REST: 99 %
STRESS PEAK DIAS BP: 62 MMHG
STRESS PEAK SYS BP: 122 MMHG
STRESS PERCENT HR ACHIEVED: 60 %
STRESS POST PEAK HR: 91 BPM
STRESS RATE PRESSURE PRODUCT: NORMAL BPM*MMHG
STRESS ST DEPRESSION: 0 MM
STRESS ST ELEVATION: 0 MM
STRESS TARGET HR: 151 BPM

## 2021-03-27 LAB
ECHO AO ASC DIAM: 2.62 CM
ECHO AO ROOT DIAM: 3.05 CM
ECHO AV AREA PEAK VELOCITY: 1.48 CM2
ECHO AV AREA VTI: 1.18 CM2
ECHO AV AREA/BSA PEAK VELOCITY: 0.9 CM2/M2
ECHO AV AREA/BSA VTI: 0.7 CM2/M2
ECHO AV MEAN GRADIENT: 5.66 MMHG
ECHO AV PEAK GRADIENT: 10.56 MMHG
ECHO AV PEAK VELOCITY: 162.44 CM/S
ECHO AV VTI: 42.68 CM
ECHO EST RA PRESSURE: 3 MMHG
ECHO LA AREA 4C: 19.22 CM2
ECHO LA MAJOR AXIS: 4.3 CM
ECHO LA MINOR AXIS: 2.59 CM
ECHO LA VOL 2C: 46.95 ML (ref 18–58)
ECHO LA VOL 4C: 55.8 ML (ref 18–58)
ECHO LA VOL BP: 56.49 ML (ref 18–58)
ECHO LA VOL/BSA BIPLANE: 33.99 ML/M2 (ref 16–28)
ECHO LA VOLUME INDEX A2C: 28.25 ML/M2 (ref 16–28)
ECHO LA VOLUME INDEX A4C: 33.58 ML/M2 (ref 16–28)
ECHO LV E' LATERAL VELOCITY: 4.48 CM/S
ECHO LV E' SEPTAL VELOCITY: 2.58 CM/S
ECHO LV EDV A2C: 76.59 ML
ECHO LV EDV A4C: 98.5 ML
ECHO LV EDV BP: 88.83 ML (ref 67–155)
ECHO LV EDV INDEX A4C: 59.3 ML/M2
ECHO LV EDV INDEX BP: 53.5 ML/M2
ECHO LV EDV NDEX A2C: 46.1 ML/M2
ECHO LV EJECTION FRACTION A2C: 49 PERCENT
ECHO LV EJECTION FRACTION A4C: 65 PERCENT
ECHO LV EJECTION FRACTION BIPLANE: 57 PERCENT (ref 55–100)
ECHO LV ESV A2C: 39.18 ML
ECHO LV ESV A4C: 34.9 ML
ECHO LV ESV BP: 38.19 ML (ref 22–58)
ECHO LV ESV INDEX A2C: 23.6 ML/M2
ECHO LV ESV INDEX A4C: 21 ML/M2
ECHO LV ESV INDEX BP: 23 ML/M2
ECHO LV INTERNAL DIMENSION DIASTOLIC: 3.64 CM (ref 4.2–5.9)
ECHO LV INTERNAL DIMENSION SYSTOLIC: 2.75 CM
ECHO LV IVSD: 1.16 CM (ref 0.6–1)
ECHO LV MASS 2D: 131.3 G (ref 88–224)
ECHO LV MASS INDEX 2D: 79 G/M2 (ref 49–115)
ECHO LV POSTERIOR WALL DIASTOLIC: 1.1 CM (ref 0.6–1)
ECHO LVOT DIAM: 1.97 CM
ECHO LVOT PEAK GRADIENT: 2.48 MMHG
ECHO LVOT PEAK VELOCITY: 78.76 CM/S
ECHO LVOT SV: 50.2 ML
ECHO LVOT VTI: 16.48 CM
ECHO MV A VELOCITY: 103.59 CM/S
ECHO MV AREA PHT: 3.68 CM2
ECHO MV E DECELERATION TIME (DT): 206.09 MS
ECHO MV E VELOCITY: 70.49 CM/S
ECHO MV E/A RATIO: 0.68
ECHO MV E/E' LATERAL: 15.73
ECHO MV E/E' RATIO (AVERAGED): 21.53
ECHO MV E/E' SEPTAL: 27.32
ECHO MV PRESSURE HALF TIME (PHT): 59.77 MS
ECHO RA MAJOR AXIS: 3.41 CM
ECHO RIGHT VENTRICULAR SYSTOLIC PRESSURE (RVSP): 33 MMHG
ECHO RV INTERNAL DIMENSION: 3.39 CM
ECHO RV TAPSE: 1.41 CM (ref 1.5–2)
ECHO TV REGURGITANT MAX VELOCITY: 272.36 CM/S
ECHO TV REGURGITANT PEAK GRADIENT: 29.67 MMHG
LA VOL DISK BP: 52.78 ML (ref 18–58)
LVOT MG: 1.39 MMHG

## 2021-04-02 RX ORDER — METFORMIN HYDROCHLORIDE 1000 MG/1
TABLET ORAL
Qty: 180 TAB | Refills: 0 | Status: SHIPPED | OUTPATIENT
Start: 2021-04-02 | End: 2021-06-30

## 2021-05-20 ENCOUNTER — OFFICE VISIT (OUTPATIENT)
Dept: FAMILY MEDICINE CLINIC | Age: 71
End: 2021-05-20
Payer: MEDICARE

## 2021-05-20 ENCOUNTER — TELEPHONE (OUTPATIENT)
Dept: FAMILY MEDICINE CLINIC | Age: 71
End: 2021-05-20

## 2021-05-20 VITALS
TEMPERATURE: 97.4 F | BODY MASS INDEX: 24.41 KG/M2 | HEIGHT: 63 IN | DIASTOLIC BLOOD PRESSURE: 77 MMHG | SYSTOLIC BLOOD PRESSURE: 132 MMHG | RESPIRATION RATE: 16 BRPM | HEART RATE: 60 BPM | OXYGEN SATURATION: 98 % | WEIGHT: 137.8 LBS

## 2021-05-20 DIAGNOSIS — I10 HYPERTENSION, ESSENTIAL: ICD-10-CM

## 2021-05-20 DIAGNOSIS — E11.22 CKD STAGE 3 DUE TO TYPE 2 DIABETES MELLITUS (HCC): ICD-10-CM

## 2021-05-20 DIAGNOSIS — E87.5 HYPERKALEMIA: ICD-10-CM

## 2021-05-20 DIAGNOSIS — R06.09 DOE (DYSPNEA ON EXERTION): ICD-10-CM

## 2021-05-20 DIAGNOSIS — E11.65 TYPE 2 DIABETES MELLITUS WITH HYPERGLYCEMIA, WITHOUT LONG-TERM CURRENT USE OF INSULIN (HCC): Primary | ICD-10-CM

## 2021-05-20 DIAGNOSIS — Z95.1 S/P CABG X 3: ICD-10-CM

## 2021-05-20 DIAGNOSIS — N18.30 CKD STAGE 3 DUE TO TYPE 2 DIABETES MELLITUS (HCC): ICD-10-CM

## 2021-05-20 DIAGNOSIS — E78.5 DYSLIPIDEMIA, GOAL LDL BELOW 100: ICD-10-CM

## 2021-05-20 PROBLEM — Z78.9 STATIN INTOLERANCE: Status: RESOLVED | Noted: 2017-07-30 | Resolved: 2021-05-20

## 2021-05-20 LAB
ALBUMIN SERPL-MCNC: 4.1 G/DL (ref 3.5–5)
ALBUMIN/GLOB SERPL: 1.1 {RATIO} (ref 1.1–2.2)
ALP SERPL-CCNC: 103 U/L (ref 45–117)
ALT SERPL-CCNC: 36 U/L (ref 12–78)
ANION GAP SERPL CALC-SCNC: 7 MMOL/L (ref 5–15)
AST SERPL-CCNC: 24 U/L (ref 15–37)
BILIRUB SERPL-MCNC: 0.3 MG/DL (ref 0.2–1)
BUN SERPL-MCNC: 22 MG/DL (ref 6–20)
BUN/CREAT SERPL: 19 (ref 12–20)
CALCIUM SERPL-MCNC: 9.5 MG/DL (ref 8.5–10.1)
CHLORIDE SERPL-SCNC: 109 MMOL/L (ref 97–108)
CHOLEST SERPL-MCNC: 165 MG/DL
CO2 SERPL-SCNC: 24 MMOL/L (ref 21–32)
CREAT SERPL-MCNC: 1.16 MG/DL (ref 0.7–1.3)
CREAT UR-MCNC: 74.3 MG/DL
EST. AVERAGE GLUCOSE BLD GHB EST-MCNC: 140 MG/DL
GLOBULIN SER CALC-MCNC: 3.6 G/DL (ref 2–4)
GLUCOSE SERPL-MCNC: 53 MG/DL (ref 65–100)
HBA1C MFR BLD: 6.5 % (ref 4–5.6)
HDLC SERPL-MCNC: 50 MG/DL
HDLC SERPL: 3.3 {RATIO} (ref 0–5)
LDLC SERPL CALC-MCNC: 78.8 MG/DL (ref 0–100)
MICROALBUMIN UR-MCNC: 101 MG/DL
MICROALBUMIN/CREAT UR-RTO: 1359 MG/G (ref 0–30)
POTASSIUM SERPL-SCNC: 5.7 MMOL/L (ref 3.5–5.1)
PROT SERPL-MCNC: 7.7 G/DL (ref 6.4–8.2)
SODIUM SERPL-SCNC: 140 MMOL/L (ref 136–145)
TRIGL SERPL-MCNC: 181 MG/DL (ref ?–150)
VLDLC SERPL CALC-MCNC: 36.2 MG/DL

## 2021-05-20 PROCEDURE — G8752 SYS BP LESS 140: HCPCS | Performed by: FAMILY MEDICINE

## 2021-05-20 PROCEDURE — G8427 DOCREV CUR MEDS BY ELIG CLIN: HCPCS | Performed by: FAMILY MEDICINE

## 2021-05-20 PROCEDURE — 99214 OFFICE O/P EST MOD 30 MIN: CPT | Performed by: FAMILY MEDICINE

## 2021-05-20 PROCEDURE — 3017F COLORECTAL CA SCREEN DOC REV: CPT | Performed by: FAMILY MEDICINE

## 2021-05-20 PROCEDURE — G8510 SCR DEP NEG, NO PLAN REQD: HCPCS | Performed by: FAMILY MEDICINE

## 2021-05-20 PROCEDURE — G8536 NO DOC ELDER MAL SCRN: HCPCS | Performed by: FAMILY MEDICINE

## 2021-05-20 PROCEDURE — G8420 CALC BMI NORM PARAMETERS: HCPCS | Performed by: FAMILY MEDICINE

## 2021-05-20 PROCEDURE — 1101F PT FALLS ASSESS-DOCD LE1/YR: CPT | Performed by: FAMILY MEDICINE

## 2021-05-20 PROCEDURE — G8754 DIAS BP LESS 90: HCPCS | Performed by: FAMILY MEDICINE

## 2021-05-20 PROCEDURE — 2022F DILAT RTA XM EVC RTNOPTHY: CPT | Performed by: FAMILY MEDICINE

## 2021-05-20 PROCEDURE — 3051F HG A1C>EQUAL 7.0%<8.0%: CPT | Performed by: FAMILY MEDICINE

## 2021-05-20 RX ORDER — FUROSEMIDE 20 MG/1
20 TABLET ORAL DAILY
Qty: 90 TABLET | Refills: 0 | Status: SHIPPED | OUTPATIENT
Start: 2021-05-20 | End: 2021-08-23

## 2021-05-20 RX ORDER — HYDROCHLOROTHIAZIDE 25 MG/1
25 TABLET ORAL DAILY
Qty: 90 TABLET | Refills: 1 | Status: SHIPPED | OUTPATIENT
Start: 2021-05-20 | End: 2021-11-29

## 2021-05-20 RX ORDER — TRAZODONE HYDROCHLORIDE 50 MG/1
TABLET ORAL
COMMUNITY
End: 2021-05-20 | Stop reason: ALTCHOICE

## 2021-05-20 RX ORDER — GLIPIZIDE 5 MG/1
5 TABLET ORAL 2 TIMES DAILY
Qty: 180 TABLET | Refills: 0 | Status: SHIPPED | OUTPATIENT
Start: 2021-05-20 | End: 2021-11-22

## 2021-05-20 RX ORDER — ISOSORBIDE MONONITRATE 30 MG/1
30 TABLET, EXTENDED RELEASE ORAL DAILY
Qty: 30 TABLET | Refills: 2 | Status: SHIPPED | OUTPATIENT
Start: 2021-05-20 | End: 2021-08-23

## 2021-05-20 NOTE — TELEPHONE ENCOUNTER
Received call from Tresa Overton from [facility] lab. Pt's name and  verified. She stated that comprehensive metabolic panel was done and pt's GLU is 53. Informed that this will be forwarded to provider.

## 2021-05-20 NOTE — PROGRESS NOTES
my staff will call him and get him in this week or next, thanks    Future Appointments   Date Time Provider Mina Delgado   9/13/2021  8:20 AM MD TEZ Nye BS AMB   9/20/2021  8:00 AM Nano Vargas MD [facility] BS AMB

## 2021-05-20 NOTE — TELEPHONE ENCOUNTER
Tresa Overton with [facility] lab called with abnormal lab results for the pt and requested to speak to the nurse.     Call was transferred to [facility]

## 2021-05-20 NOTE — PROGRESS NOTES
Results were discussed with wife Eufemia Viera who is on PHI. A1c is very stable at 6.5 but fasting sugar is very low. Decrease dose of glipizide from 10 to 5 mg twice daily and to continue on Metformin at current dose along with Januvia. Cholesterol results are very reassuring and at goal for his coronary artery disease. Kidney function is normal but still potassium level is on the higher side. As per nephrology recommendation, decrease dose of HCTZ from 50 to 25 mg and Lasix 20 mg was added. Urine is still positive for protein but has improved from last visit. Will route results to Dr. Stephen Salvador and Dr. Tanmay Mae.   thanks

## 2021-05-20 NOTE — ASSESSMENT & PLAN NOTE
borderline controlled, continue current plan pending work up below, medication adherence emphasized, Was referred to specialist on last visit, all records were reviewed.

## 2021-05-20 NOTE — PROGRESS NOTES
Chief Complaint   Patient presents with    Hypertension     4 months follow up    Diabetes       1. Have you been to the ER, urgent care clinic since your last visit? Hospitalized since your last visit? No    2. Have you seen or consulted any other health care providers outside of the [address] [place] [address] since your last visit? Include any pap smears or colon screening. No        3 most recent PHQ Screens 5/20/2021   Little interest or pleasure in doing things Not at all   Feeling down, depressed, irritable, or hopeless Not at all   Total Score PHQ 2 0       There are no preventive care reminders to display for this patient.

## 2021-05-20 NOTE — TELEPHONE ENCOUNTER
Called patient and spoke to wife who is on PHI. Informed that he needs to decrease dose of glipizide from 10 to 5 mg two times daily. If he has any left in the bottle to take half of that a new prescription will be 5 mg that he will take 1 tablet two times. Also discussed all other blood work report during the call. Potassium level is still staying high so as per nephrology recommendation will decrease dose of hydrochlorthiazide from 50 to 25 mg and will add Lasix 20 mg. All new prescriptions have been sent to pharmacy. Wife verbalized my instructions.

## 2021-05-20 NOTE — Clinical Note
Dr Nathan Cuenca, Mr. Serene Castillo is having worsening DONALDSON. After reviewing his last nuclear stress test, I have added small dose of Imdur and has strongly recommended to follow-up with you. Please check if it is appropriate.  Thanks

## 2021-05-20 NOTE — PROGRESS NOTES
HISTORY OF PRESENT ILLNESS  Miguel Marshall is a 72 y.o. male. Patient was seen today for follow-up on diabetes, coronary artery disease status post CABG x3, CKD, hypertension, dyslipidemia. He is concerned about worsening shortness of breath with exertion. Recently received message from his kidney specialist, Dr. Tanmay Mae regarding his assessment and plan and management for his hyperkalemia. Reviewed his last nuclear stress test and echocardiogram that was done in March. Patient has not followed up with cardiology since. HPI  Cardiovascular Review  The patient has hypertension, hyperlipidemia, coronary artery disease, status post coronary artery stenting and had 2 vessel PCI on 11/11/2016, s/p CABG x 3 on 07/09/2018. [facility] reports taking medications as instructed, no medication side effects noted, notes stable dyspnea on exertion, no change, no swelling of ankles, no orthostatic dizziness or lightheadedness, no orthopnea or paroxysmal nocturnal dyspnea, no palpitations, no intermittent claudication symptoms.  Diet and Lifestyle: generally follows a low fat low cholesterol diet, generally follows a low sodium diet, nonsmoker.  Lab review: labs reviewed and discussed with patient.  Britany Stearns  Had echocardiogram and nuclear cardiac stress test on March 15, 2021. Stress test was significant for  · Myocardial perfusion imaging defect 1: There is a defect that is medium in size present in the apical and inferolateral location(s) that is reversible. The defect appears to be ischemia. Perfusion defect was visually and quantitatively present. Myocardial perfusion imaging defect 2: There is a defect that is medium in size affecting the mid and inferolateral location(s) that is non-reversible. The defect appears to be infarction.   Medicines:ASA, Metoprolol 50 mg bid, Candesartan 32 mg (changed from  Losartan 100 mg) , Hctz 50 mg, Atorvastatin 20 mg     Endocrine Review  He is seen for diabetes.  Since last visit he reports: no polyuria or polydipsia, no chest pain, dyspnea or TIA's, no numbness, tingling or pain in extremities, no unusual visual symptoms, weight has decreased, no significant changes.  Home glucose monitoring: is performed sporadically, nonfasting values range 160-200  He is checking his sugars one per day. [facility] reports medication compliance: compliant all of the time.  Medication side effects: none.  Diabetic diet compliance: noncompliant some of the time.  Lab review: labs reviewed and discussed with patient.  Eye exam: UTD.    On  Glipizide 10 mg BID and Januvia 50 mg, Metformin 1 gm bid  CKD:  Stage of Chronic Kidney Disease III exhibited by:  Symptoms:none  Labs:  Lab Results   Component Value Date/Time    GFR est AA >60 01/20/2021 08:56 AM    GFR est non-AA 52 (L) 01/20/2021 08:56 AM    Creatinine 1.36 (H) 01/20/2021 08:56 AM    BUN 15 01/20/2021 08:56 AM    Sodium 140 01/20/2021 08:56 AM    Potassium 6.5 (H) 01/20/2021 08:56 AM    Chloride 111 (H) 01/20/2021 08:56 AM    CO2 24 01/20/2021 08:56 AM      Was referred to nephrologist on last visit, was recommended to change HCTZ 50 mg to 25 mg and to add Lasix 20 mg hyperkalemia. rReview of Systems   Constitutional: Negative for chills, fever and malaise/fatigue. HENT: Negative for congestion, ear pain, sore throat and tinnitus. Eyes: Negative for blurred vision, double vision, pain and discharge. Respiratory: Positive for shortness of breath. Negative for cough and wheezing. Cardiovascular: Negative for chest pain, palpitations and leg swelling. Gastrointestinal: Negative for abdominal pain, blood in stool, constipation, diarrhea, nausea and vomiting. Genitourinary: Negative for dysuria, frequency, hematuria and urgency. Musculoskeletal: Negative for back pain, joint pain and myalgias. Skin: Negative for rash. Neurological: Negative for dizziness, tremors, seizures and headaches. Endo/Heme/Allergies: Negative for polydipsia. Does not bruise/bleed easily. Psychiatric/Behavioral: Negative for depression and substance abuse. The patient is not nervous/anxious. Physical Exam  Vitals and nursing note reviewed. Constitutional:       Appearance: He is well-developed. He is not diaphoretic. HENT:      Head: Normocephalic and atraumatic. Right Ear: External ear normal.      Mouth/Throat:      Pharynx: No oropharyngeal exudate. Eyes:      General: No scleral icterus. Conjunctiva/sclera: Conjunctivae normal.      Pupils: Pupils are equal, round, and reactive to light. Neck:      Thyroid: No thyromegaly. Vascular: No JVD. Cardiovascular:      Rate and Rhythm: Normal rate and regular rhythm. Pulses:           Dorsalis pedis pulses are 2+ on the right side and 2+ on the left side. Posterior tibial pulses are 2+ on the right side and 2+ on the left side. Heart sounds: Normal heart sounds. No murmur heard. Pulmonary:      Effort: Pulmonary effort is normal.      Breath sounds: Normal breath sounds. No wheezing. Abdominal:      General: Bowel sounds are normal. There is no distension. Palpations: Abdomen is soft. There is no mass. Musculoskeletal:         General: No tenderness. Normal range of motion. Cervical back: Normal range of motion and neck supple. Feet:      Right foot:      Protective Sensation: 10 sites tested. 9 sites sensed. Toenail Condition: Fungal disease present. Left foot:      Protective Sensation: 10 sites tested. 9 sites sensed. Toenail Condition: Fungal disease present. Comments: Toenails are very brittle and white  Lymphadenopathy:      Cervical: No cervical adenopathy. Skin:     General: Skin is warm and dry. Findings: No rash. Neurological:      Mental Status: He is alert and oriented to person, place, and time. Cranial Nerves: No cranial nerve deficit. Deep Tendon Reflexes: Reflexes are normal and symmetric.  Reflexes normal.         ASSESSMENT and PLAN  Diagnoses and all orders for this visit:    1. Type 2 diabetes mellitus with hyperglycemia, without long-term current use of insulin ([facility]  Assessment & Plan:   well controlled, continue current medications pending work up below, medication adherence emphasized, lifestyle modifications recommended    Orders:  -     HEMOGLOBIN A1C WITH EAG; Future  -     LIPID PANEL; Future  -     METABOLIC PANEL, COMPREHENSIVE; Future  -     MICROALBUMIN, UR, RAND W/ MICROALB/CREAT RATIO; Future  -     HM DIABETES FOOT EXAM    2. CKD stage 3 due to type 2 diabetes mellitus ([facility]  Assessment & Plan:   borderline controlled, continue current plan pending work up below, medication adherence emphasized, Was referred to specialist on last visit, all records were reviewed. Orders:  -     METABOLIC PANEL, COMPREHENSIVE; Future  -     MICROALBUMIN, UR, RAND W/ MICROALB/CREAT RATIO; Future    3. S/P CABG x 3  -     isosorbide mononitrate ER (IMDUR) 30 mg tablet; Take 1 Tablet by mouth daily. 4. Hypertension, essential  -     METABOLIC PANEL, COMPREHENSIVE; Future    5. Dyslipidemia, goal LDL below 100  -     LIPID PANEL; Future  -     METABOLIC PANEL, COMPREHENSIVE; Future    6. Hyperkalemia  -     METABOLIC PANEL, COMPREHENSIVE; Future    7. DONALDSON (dyspnea on exertion)  -     isosorbide mononitrate ER (IMDUR) 30 mg tablet; Take 1 Tablet by mouth daily. Recommended to follow-up with cardiology. Added Imdur after reviewing his nuclear stress test report. After reviewing his lab, will change HCTZ dose and will add Lasix as per nephrology recommendation. Discussed lifestyle issues and health guidance given  Patient was given an after visit summary which includes diagnoses, vital signs, current medications, instructions and references & authorized prescriptions . Results of labs will be conveyed to patient, once available.   Pt verbalized instructions I provided and expressed understanding of discussion that was held today. Follow-up and Dispositions    · Return in about 4 months (around 9/20/2021) for fasting, follow up.

## 2021-05-25 ENCOUNTER — TELEPHONE (OUTPATIENT)
Dept: CARDIOLOGY CLINIC | Age: 71
End: 2021-05-25

## 2021-05-25 NOTE — TELEPHONE ENCOUNTER
Attempted to reach patient by telephone. A message was left for return call. Verified patient with two types of identifiers. Spoke to spouse who reports patient is unavailable to come to see Dr. Tashia White this week. She states he is not free until Wednesday of next week. Scheduled office visit, but encouraged her to have him see us this week if his symptoms worsen as we would be happy to see him now and not wait two weeks. She verbalized understanding and will call with any other questions.

## 2021-06-08 DIAGNOSIS — I25.10 CORONARY ARTERY DISEASE INVOLVING NATIVE CORONARY ARTERY OF NATIVE HEART WITHOUT ANGINA PECTORIS: ICD-10-CM

## 2021-06-08 DIAGNOSIS — I10 HYPERTENSION, ESSENTIAL: ICD-10-CM

## 2021-06-08 RX ORDER — METOPROLOL TARTRATE 50 MG/1
TABLET ORAL
Qty: 180 TABLET | Refills: 1 | Status: SHIPPED | OUTPATIENT
Start: 2021-06-08 | End: 2021-06-10

## 2021-06-08 NOTE — TELEPHONE ENCOUNTER
Requested Prescriptions     Signed Prescriptions Disp Refills    metoprolol tartrate (LOPRESSOR) 50 mg tablet 180 Tablet 1     Sig: TAKE 1 TABLET BY MOUTH TWICE DAILY.  *DOSE REDUCED BY HALF*     Authorizing Provider: Vasquez Organ     Ordering User: Albina Valdez     Verbal order per Dr. Clarissa Rhoades.    Future Appointments   Date Time Provider Mina Delgado   6/10/2021  4:00 PM MD TEZ Cortez BS AMB   9/13/2021  8:20 AM Monik Ambrose MD [facility] BS AMB   9/20/2021  8:00 AM Lit Weber MD [facility] BS AMB

## 2021-06-10 ENCOUNTER — OFFICE VISIT (OUTPATIENT)
Dept: CARDIOLOGY CLINIC | Age: 71
End: 2021-06-10
Payer: MEDICARE

## 2021-06-10 VITALS
BODY MASS INDEX: 24.8 KG/M2 | OXYGEN SATURATION: 99 % | HEIGHT: 63 IN | SYSTOLIC BLOOD PRESSURE: 140 MMHG | WEIGHT: 140 LBS | DIASTOLIC BLOOD PRESSURE: 70 MMHG | HEART RATE: 80 BPM | RESPIRATION RATE: 16 BRPM

## 2021-06-10 DIAGNOSIS — E78.5 DYSLIPIDEMIA: ICD-10-CM

## 2021-06-10 DIAGNOSIS — I35.0 NONRHEUMATIC AORTIC VALVE STENOSIS: ICD-10-CM

## 2021-06-10 DIAGNOSIS — Z78.9 STATIN INTOLERANCE: ICD-10-CM

## 2021-06-10 DIAGNOSIS — Z95.1 S/P CABG X 3: ICD-10-CM

## 2021-06-10 DIAGNOSIS — I25.10 CORONARY ARTERY DISEASE INVOLVING NATIVE CORONARY ARTERY OF NATIVE HEART WITHOUT ANGINA PECTORIS: Primary | ICD-10-CM

## 2021-06-10 DIAGNOSIS — I10 ESSENTIAL HYPERTENSION: ICD-10-CM

## 2021-06-10 PROCEDURE — 3017F COLORECTAL CA SCREEN DOC REV: CPT | Performed by: SPECIALIST

## 2021-06-10 PROCEDURE — 1101F PT FALLS ASSESS-DOCD LE1/YR: CPT | Performed by: SPECIALIST

## 2021-06-10 PROCEDURE — G8420 CALC BMI NORM PARAMETERS: HCPCS | Performed by: SPECIALIST

## 2021-06-10 PROCEDURE — G8536 NO DOC ELDER MAL SCRN: HCPCS | Performed by: SPECIALIST

## 2021-06-10 PROCEDURE — G8754 DIAS BP LESS 90: HCPCS | Performed by: SPECIALIST

## 2021-06-10 PROCEDURE — G8510 SCR DEP NEG, NO PLAN REQD: HCPCS | Performed by: SPECIALIST

## 2021-06-10 PROCEDURE — G8753 SYS BP > OR = 140: HCPCS | Performed by: SPECIALIST

## 2021-06-10 PROCEDURE — G8427 DOCREV CUR MEDS BY ELIG CLIN: HCPCS | Performed by: SPECIALIST

## 2021-06-10 PROCEDURE — 99214 OFFICE O/P EST MOD 30 MIN: CPT | Performed by: SPECIALIST

## 2021-06-10 PROCEDURE — 93000 ELECTROCARDIOGRAM COMPLETE: CPT | Performed by: SPECIALIST

## 2021-06-10 NOTE — PROGRESS NOTES
Neal Kendall     1950       Monik Christianson MD, [facility]  Date of Visit-6/10/2021   PCP is Pavithra Giles MD   [facility] [place]  [facility]  HPI:  Jens Lemon is a 72 y.o. male   7 month f/u of CAD with CABG 6/9/2018. Prior NSTEMI in November 2016 and resolved pulmonary HTN. Prior stent to circumflex an LAD in 2016. He had a f/u stress echo in 2018 with a drop in BP with exercise and a drop in EF. He had a cath on 6/22/18 that showed even with a 5F catheter, he dampened with suspected ostial 3 vessel disease. Testing in March with echo and nuclear were stable. 03/15/21    ECHO ADULT COMPLETE 03/27/2021 3/27/2021    Interpretation Summary  · LV: Estimated LVEF is 55 - 60%. Biplane method used to measure ejection fraction. Normal cavity size and systolic function (ejection fraction normal). Mild concentric hypertrophy. Wall motion: normal. Mild (grade 1) left ventricular diastolic dysfunction. · AV: Aortic valve leaflet calcification present. Mild aortic valve regurgitation is present. · MV: Mitral valve thickening. Mitral valve leaflet calcification. Moderate mitral annular calcification. Mild mitral valve regurgitation is present. · TV: Mild tricuspid valve regurgitation is present. · PV: Mild pulmonic valve regurgitation is present. · LA: Mildly dilated left atrium. · PA: Pulmonary arterial systolic pressure is 33 mmHg. Signed by: Renan Dc MD on 3/27/2021  7:40 PM    03/15/21    NUCLEAR CARDIAC STRESS TEST 03/18/2021 3/19/2021    Interpretation Summary  · SPECT: Left ventricular function post-stress was normal. Calculated ejection fraction is 64%. There is no evidence of transient ischemic dilation (TID). The TID ratio is 1.07.  · Baseline ECG: Normal sinus rhythm, non-specific ST-T wave abnormalities.   · SPECT: Myocardial perfusion imaging defect 1: There is a defect that is medium in size present in the apical and inferolateral location(s) that is reversible. The defect appears to be ischemia. Perfusion defect was visually and quantitatively present. Myocardial perfusion imaging defect 2: There is a defect that is medium in size affecting the mid and inferolateral location(s) that is non-reversible. The defect appears to be infarction. · SPECT: Left ventricular perfusion is abnormal. an intermediate risk stress test.    Signed by: Pedro Mcbride MD on 3/18/2021 11:12 AM    Had labs with Dixon Severin, MD which are reviewed. Pt states he gets tired fairly easily. He states the tiredness had started two months ago. He feels improved after taking medication. Pt denies any swelling in feet. Denies chest pain, edema, syncope or shortness of breath at rest, has no tachycardia, palpitations or sense of arrhythmia. EKG:   Sinus  Rhythm   -Left atrial enlargement. Possible left ventricular hypertrophy on non-voltage basis. -ST depression   +   Negative T-waves  -Seen with left ventricular hypertrophy (strain) or digitalis effect -consider ischemia  consider  Lateral ischemia. Assessment/Plan:     Patient Instructions   Please stop your metoprolol. We will see you back in 2 months. 1. Coronary artery disease involving native coronary artery of native heart without angina pectoris  CABG 2018 with abnormal stress test. Repeat nuclear shows no ischemia. Pain free. Now with fatigue and DONALDSON. Will stop beta-blocker. 2. Essential hypertension  Previously well controlled. Elevated today. BP Readings from Last 6 Encounters:   06/10/21 (!) 140/70   05/20/21 132/77   03/15/21 130/80   03/15/21 118/78   01/20/21 128/82   10/14/20 112/74      [place] CAD CHF Meds             isosorbide mononitrate ER (IMDUR) 30 mg tablet (Taking) Take 1 Tablet by mouth daily. hydroCHLOROthiazide (HYDRODIURIL) 25 mg tablet (Taking) Take 1 Tablet by mouth daily. furosemide (LASIX) 20 mg tablet (Taking) Take 1 Tablet by mouth daily. candesartan (ATACAND) 32 mg tablet (Taking) Take 1 Tab by mouth daily. atorvastatin (LIPITOR) 20 mg tablet (Taking) Take 1 Tab by mouth nightly. aspirin delayed-release 81 mg tablet (Taking) Take 1 Tab by mouth. 3. Dyslipidemia  Lipids on high potency statin as appropriate for secondary prevention. At goal , denies excess muscle aches or new liver issues  Key Antihyperlipidemia Meds             atorvastatin (LIPITOR) 20 mg tablet (Taking) Take 1 Tab by mouth nightly. Lab Results   Component Value Date/Time    LDL, calculated 78.8 05/20/2021 08:43 AM         4. Nonrheumatic aortic valve stenosis  --echo with normal EF, calcified AV with mild AR  5. S/P CABG x 3  6. Statin intolerance  F/u in 2 months     Impression:   1. Coronary artery disease involving native coronary artery of native heart without angina pectoris    2. Essential hypertension    3. Dyslipidemia    4. Nonrheumatic aortic valve stenosis    5. S/P CABG x 3    6. Statin intolerance       Cardiac History:   Echo 1-15-18 EF 63% 63 %. Mild LAE, MAC, mild AS mean 8, HUY 1.8 mild TR  CABG x 3 7-9-18= LIMA to LAD, SVG-OM, SVG-dRCA   Echo 6-22-18 EF 55-60%, mod MAC, aortic sclerosis without stenosis , mild TR  LISA 6-18-18 4 minutes +moderate hypokinesis of the mid anteroseptal, entire inferior, apical septal, and apical wall(s). With exercise a mild reduction in LV function. PCI 11-11-16 JEREMY to mLAD 95% JEREMY to OM1 90%     Future Appointments   Date Time Provider Mina Delgado   6/10/2021  4:00 PM MD TEZ Olivares BS AMB   8/19/2021  1:00 PM MD TEZ Olivares BS AMB   9/20/2021  8:00 AM Wagner Hernandez MD [facility] BS AMB        ROS-except as noted above. . A complete cardiac and respiratory are reviewed and negative except as above ; Resp-denies wheezing  or productive cough,.  Const- No unusual weight loss or fever; Neuro-no recent seizure or CVA ; GI- No BRBPR, abdom pain, bloating ; - no  hematuria   see supplement sheet, initialed and to be scanned by staff  Past Medical History:   Diagnosis Date    CAD (coronary artery disease) 11/10/2016    NSTEMI & 2 stents    Deafness 10/28/2012    DM (diabetes mellitus) ([facility]     Elevated cholesterol     Hypertension     NSTEMI (non-ST elevated myocardial infarction) ([facility] 11/10/2016      Social Hx= reports that he has never smoked. He has never used smokeless tobacco. He reports current alcohol use of about 2.0 standard drinks of alcohol per week. He reports that he does not use drugs. Exam and Labs:  BP (!) 140/70   Pulse 80   Resp 16   Ht 5' 3\" (1.6 m)   Wt 140 lb (63.5 kg)   SpO2 99%   BMI 24.80 kg/m² Constitutional:  NAD, comfortable  Head: NC,AT. Eyes: No scleral icterus. Neck:  Neck supple. No JVD present. Throat: moist mucous membranes. Chest: Effort normal & normal respiratory excursion . Neurological: alert, conversant and oriented . Skin: Skin is not cold. No obvious systemic rash noted. Not diaphoretic. No erythema. Psychiatric:  Grossly normal mood and affect. Behavior appears normal. Extremities:  no clubbing or cyanosis. Abdomen: non distended    Lungs:breath sounds normal. No stridor. distress, wheezes or  Rales. SYTOP:4/9 systolic murmur RUSB normal rate, regular rhythm, normal S1, S2, no rubs, clicks or gallops , PMI non displaced. Edema: Edema is none.   Lab Results   Component Value Date/Time    Cholesterol, total 165 05/20/2021 08:43 AM    HDL Cholesterol 50 05/20/2021 08:43 AM    LDL, calculated 78.8 05/20/2021 08:43 AM    Triglyceride 181 (H) 05/20/2021 08:43 AM    CHOL/HDL Ratio 3.3 05/20/2021 08:43 AM     Lab Results   Component Value Date/Time    Sodium 140 05/20/2021 08:43 AM    Potassium 5.7 (H) 05/20/2021 08:43 AM    Chloride 109 (H) 05/20/2021 08:43 AM    CO2 24 05/20/2021 08:43 AM    Anion gap 7 05/20/2021 08:43 AM    Glucose 53 (LL) 05/20/2021 08:43 AM    BUN 22 (H) 05/20/2021 08:43 AM    Creatinine 1.16 05/20/2021 08:43 AM BUN/Creatinine ratio 19 05/20/2021 08:43 AM    GFR est AA >60 05/20/2021 08:43 AM    GFR est non-AA >60 05/20/2021 08:43 AM    Calcium 9.5 05/20/2021 08:43 AM      Wt Readings from Last 3 Encounters:   06/10/21 140 lb (63.5 kg)   05/20/21 137 lb 12.8 oz (62.5 kg)   03/15/21 140 lb (63.5 kg)      BP Readings from Last 3 Encounters:   06/10/21 (!) 140/70   05/20/21 132/77   03/15/21 130/80      Current Outpatient Medications   Medication Sig    isosorbide mononitrate ER (IMDUR) 30 mg tablet Take 1 Tablet by mouth daily.  glipiZIDE (GLUCOTROL) 5 mg tablet Take 1 Tablet by mouth two (2) times a day.  hydroCHLOROthiazide (HYDRODIURIL) 25 mg tablet Take 1 Tablet by mouth daily.  furosemide (LASIX) 20 mg tablet Take 1 Tablet by mouth daily.  metFORMIN (GLUCOPHAGE) 1,000 mg tablet TAKE 1 TABLET BY MOUTH TWICE DAILY WITH MEALS    Januvia 50 mg tablet Take 1 tablet by mouth once daily    candesartan (ATACAND) 32 mg tablet Take 1 Tab by mouth daily.  atorvastatin (LIPITOR) 20 mg tablet Take 1 Tab by mouth nightly.  aspirin delayed-release 81 mg tablet Take 1 Tab by mouth.  traZODone (DESYREL) 50 mg tablet TAKE 1/2 (ONE-HALF) TABLET BY MOUTH NIGHTLY     No current facility-administered medications for this visit. Impression see above.       Written by Larissa Grier, as dictated by Allie Jefferson MD.

## 2021-06-10 NOTE — Clinical Note
6/10/2021 Patient: Ham Grande YOB: 1950 Date of Visit: 6/10/2021 Lizzie Toeldo MD 
[address] [place] [id number] [address] [place] Dear Lizzie Toledo MD, Thank you for referring Mr. Ham Grande to [address] [facility] [address]  for evaluation. My notes for this consultation are attached. If you have questions, please do not hesitate to call me. I look forward to following your patient along with you.  
 
 
Sincerely, 
 
Cynthia Miller MD

## 2021-08-19 ENCOUNTER — OFFICE VISIT (OUTPATIENT)
Dept: CARDIOLOGY CLINIC | Age: 71
End: 2021-08-19
Payer: MEDICARE

## 2021-08-19 VITALS
HEIGHT: 63 IN | BODY MASS INDEX: 26.93 KG/M2 | OXYGEN SATURATION: 98 % | DIASTOLIC BLOOD PRESSURE: 90 MMHG | WEIGHT: 152 LBS | HEART RATE: 75 BPM | RESPIRATION RATE: 16 BRPM | SYSTOLIC BLOOD PRESSURE: 120 MMHG

## 2021-08-19 DIAGNOSIS — R53.83 FATIGUE, UNSPECIFIED TYPE: ICD-10-CM

## 2021-08-19 DIAGNOSIS — I10 ESSENTIAL HYPERTENSION: ICD-10-CM

## 2021-08-19 DIAGNOSIS — Z95.1 S/P CABG X 3: ICD-10-CM

## 2021-08-19 DIAGNOSIS — I35.8 NON-RHEUMATIC AORTIC SCLEROSIS: ICD-10-CM

## 2021-08-19 DIAGNOSIS — Z78.9 STATIN INTOLERANCE: ICD-10-CM

## 2021-08-19 DIAGNOSIS — I25.10 CORONARY ARTERY DISEASE INVOLVING NATIVE CORONARY ARTERY OF NATIVE HEART WITHOUT ANGINA PECTORIS: Primary | ICD-10-CM

## 2021-08-19 DIAGNOSIS — E78.5 DYSLIPIDEMIA: ICD-10-CM

## 2021-08-19 PROCEDURE — G8755 DIAS BP > OR = 90: HCPCS | Performed by: SPECIALIST

## 2021-08-19 PROCEDURE — G8752 SYS BP LESS 140: HCPCS | Performed by: SPECIALIST

## 2021-08-19 PROCEDURE — G8419 CALC BMI OUT NRM PARAM NOF/U: HCPCS | Performed by: SPECIALIST

## 2021-08-19 PROCEDURE — 99214 OFFICE O/P EST MOD 30 MIN: CPT | Performed by: SPECIALIST

## 2021-08-19 PROCEDURE — 1101F PT FALLS ASSESS-DOCD LE1/YR: CPT | Performed by: SPECIALIST

## 2021-08-19 PROCEDURE — G8536 NO DOC ELDER MAL SCRN: HCPCS | Performed by: SPECIALIST

## 2021-08-19 PROCEDURE — 3017F COLORECTAL CA SCREEN DOC REV: CPT | Performed by: SPECIALIST

## 2021-08-19 PROCEDURE — G8427 DOCREV CUR MEDS BY ELIG CLIN: HCPCS | Performed by: SPECIALIST

## 2021-08-19 PROCEDURE — G8510 SCR DEP NEG, NO PLAN REQD: HCPCS | Performed by: SPECIALIST

## 2021-08-19 RX ORDER — METOPROLOL TARTRATE 50 MG/1
TABLET ORAL
COMMUNITY
End: 2021-08-19

## 2021-08-19 NOTE — Clinical Note
8/19/2021    Patient: Khushbu Blackman   YOB: 1950   Date of Visit: 8/19/2021     Mark Calderón MD  [address] [address]  [address]    Dear Mark Calderón MD,      Thank you for referring Mr. Khushbu Blackman to [address] [place], [place] [address]  for evaluation. My notes for this consultation are attached. If you have questions, please do not hesitate to call me. I look forward to following your patient along with you.       Sincerely,    Radha Shelton MD

## 2021-08-19 NOTE — PROGRESS NOTES
Neal Kendall     1950       Monik Montaño MD, [facility]  Date of Visit-8/19/2021   PCP is Darrel Joshi MD   [facility] [place]  [facility]  HPI:  Baldemar Hernandez is a 72 y.o. male   2 month f/u of   · CAD with CABG 6/9/2018. · NSTEMI in November 2016 and resolved pulmonary HTN.    · Stent to circumflex an LAD in 2016. · Stress echo in 2018 with a drop in BP with exercise and a drop in EF. · Cath on 6/22/18 that showed even with a 5F catheter, he dampened with suspected ostial 3 vessel disease.   · Echo 3/27/2021 = EF 55 to 60% mild AR MR TR LAE MAC  · Nuclear 3/18/2021 EF 64% medium size defect apical and inferior lateral reversible ischemia medium defect mid and inferolateral nonreversible appear to be infarction intermediate risk stress test     Last visit we reduced  Metoprolol. The dose was reduced but because he had issues with his potassium  When he saw his renal doctor who stopped lisinopril and increase the metoprolol for blood pressure control. In the time that he was off the metoprolol he did seem to have less fatigue. His BP is stable. HR now 75. Pt states that his potassium has been high. He notes that he is still experiencing fatigue and gets tired too quickly. Denies chest pain, edema, syncope or shortness of breath at rest, has no tachycardia, palpitations or sense of arrhythmia. Assessment/Plan:     1. Coronary artery disease involving native coronary artery of native heart without angina pectoris  CABG 2018. Has fatigue. Stop BB for one week. Nuclear march 2021. Reversible defect apex and IL. Has no CP. If not improved consider cath. Much of the fatigue would be the symptom but if it is bad enough we could go to cath he has no angina though    2. Essential hypertension  Stable BP. If he goes off of the BB permanently, we can use NORVASC if needed.    At goal , meds and possible side effects reviewed and patient denies  Key CAD CHF Meds             isosorbide mononitrate ER (IMDUR) 30 mg tablet (Taking) Take 1 Tablet by mouth daily. hydroCHLOROthiazide (HYDRODIURIL) 25 mg tablet (Taking) Take 1 Tablet by mouth daily. furosemide (LASIX) 20 mg tablet (Taking) Take 1 Tablet by mouth daily. candesartan (ATACAND) 32 mg tablet (Taking) Take 1 Tab by mouth daily. atorvastatin (LIPITOR) 20 mg tablet (Taking) Take 1 Tab by mouth nightly. aspirin delayed-release 81 mg tablet (Taking) Take 1 Tab by mouth. BP Readings from Last 6 Encounters:   08/19/21 (!) 120/90   06/10/21 (!) 140/70   05/20/21 132/77   03/15/21 130/80   03/15/21 118/78   01/20/21 128/82        3. Dyslipidemia  Lipids on high potency statin as appropriate for secondary prevention. At goal , denies excess muscle aches or new liver issues  Key Antihyperlipidemia Meds             atorvastatin (LIPITOR) 20 mg tablet (Taking) Take 1 Tab by mouth nightly. Lab Results   Component Value Date/Time    LDL, calculated 78.8 05/20/2021 08:43 AM       4. Nonrheumatic aortic valve sclerosis  Calcified Aortic valve without stenosis. 5. S/P CABG x 3  6. Statin intolerance  F/u by phone call in 1 week, f/u in 6 months likely  Patient Instructions   Please stop your metoprolol. We will see you back for telephone call next week. Impression:   1. Coronary artery disease involving native coronary artery of native heart without angina pectoris    2. Essential hypertension    3. Dyslipidemia    4. Non-rheumatic aortic sclerosis    5. S/P CABG x 3    6. Statin intolerance    7. Fatigue, unspecified type       Cardiac History:   Echo 1-15-18 EF 63% 63 %. Mild LAE, MAC, mild AS mean 8, HUY 1.8 mild TR  CABG x 3 7-9-18= LIMA to LAD, SVG-OM, SVG-dRCA   Echo 6-22-18 EF 55-60%, mod MAC, aortic sclerosis without stenosis , mild TR  LISA 6-18-18 4 minutes +moderate hypokinesis of the mid anteroseptal, entire inferior, apical septal, and apical wall(s).  With exercise a mild reduction in LV function. PCI 11-11-16 JEREMY to mLAD 95% JEREMY to OM1 90%     Future Appointments   Date Time Provider Mina Delgado   8/24/2021 12:40 PM MD TEZ Carrasco [facility]   9/22/2021  8:00 AM MD HI Merchant [facility]   2/18/2022  1:20 PM Monik Ambrose MD CAVREY  AMB        ROS-except as noted above. . A complete cardiac and respiratory are reviewed and negative except as above ; Resp-denies wheezing  or productive cough,. Const- No unusual weight loss or fever; Neuro-no recent seizure or CVA ; GI- No BRBPR, abdom pain, bloating ; - no  hematuria   see supplement sheet, initialed and to be scanned by staff  Past Medical History:   Diagnosis Date    CAD (coronary artery disease) 11/10/2016    NSTEMI & 2 stents    Deafness 10/28/2012    DM [facility]di[facility]) [facility]     Elevated cholesterol     Hypertension     [facility]ST [facility] 11/10/2016      Social Hx= reports that he has never smoked. He has never used smokeless tobacco. He reports current alcohol use of about 2.0 standard drinks of alcohol per week. He reports that he does not use drugs. Exam and Labs:  BP (!) 120/90   Pulse 75   Resp 16   Ht 5' 3\" (1.6 m)   Wt 152 lb (68.9 kg)   SpO2 98%   BMI 26.93 kg/m² Constitutional:  NAD, comfortable  Head: NC,AT. Eyes: No scleral icterus. Neck:  Neck supple. No JVD present. Throat: moist mucous membranes. Chest: Effort normal & normal respiratory excursion . Neurological: alert, conversant and oriented . Skin: Skin is not cold. No obvious systemic rash noted. Not diaphoretic. No erythema. Psychiatric:  Grossly normal mood and affect. Behavior appears normal. Extremities:  no clubbing or cyanosis. Abdomen: non distended    Lungs:breath sounds normal. No stridor. distress, wheezes or  Rales. Heart:2/6 murmur normal rate, regular rhythm, normal S1, S2, no rubs, clicks or gallops , PMI non displaced. Edema: Edema is none.   Lab Results Component Value Date/Time    Cholesterol, total 165 05/20/2021 08:43 AM    HDL Cholesterol 50 05/20/2021 08:43 AM    LDL, calculated 78.8 05/20/2021 08:43 AM    Triglyceride 181 (H) 05/20/2021 08:43 AM    CHOL/HDL Ratio 3.3 05/20/2021 08:43 AM     Lab Results   Component Value Date/Time    Sodium 140 05/20/2021 08:43 AM    Potassium 5.7 (H) 05/20/2021 08:43 AM    Chloride 109 (H) 05/20/2021 08:43 AM    CO2 24 05/20/2021 08:43 AM    Anion gap 7 05/20/2021 08:43 AM    Glucose 53 (LL) 05/20/2021 08:43 AM    BUN 22 (H) 05/20/2021 08:43 AM    Creatinine 1.16 05/20/2021 08:43 AM    BUN/Creatinine ratio 19 05/20/2021 08:43 AM    GFR est AA >60 05/20/2021 08:43 AM    GFR est non-AA >60 05/20/2021 08:43 AM    Calcium 9.5 05/20/2021 08:43 AM      Wt Readings from Last 3 Encounters:   08/19/21 152 lb (68.9 kg)   06/10/21 140 lb (63.5 kg)   05/20/21 137 lb 12.8 oz (62.5 kg)      BP Readings from Last 3 Encounters:   08/19/21 (!) 120/90   06/10/21 (!) 140/70   05/20/21 132/77      Current Outpatient Medications   Medication Sig    metoprolol tartrate (LOPRESSOR) 50 mg tablet metoprolol tartrate 50 mg tablet   Take 2 tabs PO  in AM and 1 tab PO in PM    metFORMIN (GLUCOPHAGE) 1,000 mg tablet TAKE 1 TABLET BY MOUTH TWICE DAILY WITH MEALS    Januvia 50 mg tablet Take 1 tablet by mouth once daily    isosorbide mononitrate ER (IMDUR) 30 mg tablet Take 1 Tablet by mouth daily.  glipiZIDE (GLUCOTROL) 5 mg tablet Take 1 Tablet by mouth two (2) times a day.  hydroCHLOROthiazide (HYDRODIURIL) 25 mg tablet Take 1 Tablet by mouth daily.  furosemide (LASIX) 20 mg tablet Take 1 Tablet by mouth daily.  candesartan (ATACAND) 32 mg tablet Take 1 Tab by mouth daily.  atorvastatin (LIPITOR) 20 mg tablet Take 1 Tab by mouth nightly.  aspirin delayed-release 81 mg tablet Take 1 Tab by mouth.     traZODone (DESYREL) 50 mg tablet TAKE 1/2 (ONE-HALF) TABLET BY MOUTH NIGHTLY (Patient not taking: Reported on 8/19/2021)     No current facility-administered medications for this visit. Impression see above.       Written by Jarrett Lainez, as dictated by Jaycee Phoenix MD.

## 2021-08-24 ENCOUNTER — TELEPHONE (OUTPATIENT)
Dept: CARDIOLOGY CLINIC | Age: 71
End: 2021-08-24

## 2021-08-24 ENCOUNTER — VIRTUAL VISIT (OUTPATIENT)
Dept: CARDIOLOGY CLINIC | Age: 71
End: 2021-08-24
Payer: MEDICARE

## 2021-08-24 DIAGNOSIS — I10 ESSENTIAL HYPERTENSION: ICD-10-CM

## 2021-08-24 DIAGNOSIS — Z78.9 STATIN INTOLERANCE: ICD-10-CM

## 2021-08-24 DIAGNOSIS — Z95.1 S/P CABG X 3: ICD-10-CM

## 2021-08-24 DIAGNOSIS — I35.8 NON-RHEUMATIC AORTIC SCLEROSIS: ICD-10-CM

## 2021-08-24 DIAGNOSIS — E78.5 DYSLIPIDEMIA: ICD-10-CM

## 2021-08-24 DIAGNOSIS — I25.10 CORONARY ARTERY DISEASE INVOLVING NATIVE CORONARY ARTERY OF NATIVE HEART WITHOUT ANGINA PECTORIS: Primary | ICD-10-CM

## 2021-08-24 PROCEDURE — 99441 PR PHYS/QHP TELEPHONE EVALUATION 5-10 MIN: CPT | Performed by: SPECIALIST

## 2021-08-24 NOTE — TELEPHONE ENCOUNTER
Patient's spouse states she is returning Dr. Sergo Mendez call        [facility]:[phone]

## 2021-08-25 RX ORDER — SODIUM CHLORIDE 0.9 % (FLUSH) 0.9 %
5-40 SYRINGE (ML) INJECTION AS NEEDED
Status: CANCELLED | OUTPATIENT
Start: 2021-08-25

## 2021-08-25 RX ORDER — METFORMIN HYDROCHLORIDE 1000 MG/1
TABLET ORAL
Qty: 180 TABLET | Refills: 0 | Status: SHIPPED | OUTPATIENT
Start: 2021-08-25 | End: 2021-11-22

## 2021-08-25 RX ORDER — SODIUM CHLORIDE 0.9 % (FLUSH) 0.9 %
5-40 SYRINGE (ML) INJECTION EVERY 8 HOURS
Status: CANCELLED | OUTPATIENT
Start: 2021-08-25

## 2021-08-25 RX ORDER — SODIUM CHLORIDE 9 MG/ML
1000 INJECTION, SOLUTION INTRAVENOUS CONTINUOUS
Status: CANCELLED | OUTPATIENT
Start: 2021-08-25 | End: 2021-08-25

## 2021-09-07 ENCOUNTER — HOSPITAL ENCOUNTER (OUTPATIENT)
Dept: PREADMISSION TESTING | Age: 71
Discharge: HOME | End: 2021-09-07
Payer: MEDICARE

## 2021-09-07 ENCOUNTER — TRANSCRIBE ORDER (OUTPATIENT)
Dept: REGISTRATION | Age: 71
End: 2021-09-07

## 2021-09-07 DIAGNOSIS — Z01.812 PRE-PROCEDURE LAB EXAM: Primary | ICD-10-CM

## 2021-09-07 DIAGNOSIS — Z01.812 PRE-PROCEDURE LAB EXAM: ICD-10-CM

## 2021-09-07 PROCEDURE — U0005 INFEC AGEN DETEC AMPLI PROBE: HCPCS

## 2021-09-08 ENCOUNTER — HOSPITAL ENCOUNTER (OUTPATIENT)
Age: 71
Setting detail: OUTPATIENT SURGERY
Discharge: HOME | End: 2021-09-08
Attending: INTERNAL MEDICINE | Admitting: INTERNAL MEDICINE
Payer: MEDICARE

## 2021-09-08 VITALS
DIASTOLIC BLOOD PRESSURE: 68 MMHG | BODY MASS INDEX: 20.14 KG/M2 | HEART RATE: 99 BPM | SYSTOLIC BLOOD PRESSURE: 163 MMHG | RESPIRATION RATE: 13 BRPM | WEIGHT: 136 LBS | HEIGHT: 69 IN | OXYGEN SATURATION: 100 %

## 2021-09-08 DIAGNOSIS — R06.09 DOE (DYSPNEA ON EXERTION): ICD-10-CM

## 2021-09-08 DIAGNOSIS — I25.10 CVD (CARDIOVASCULAR DISEASE): ICD-10-CM

## 2021-09-08 DIAGNOSIS — Z95.1 S/P CABG X 3: ICD-10-CM

## 2021-09-08 LAB
COVID-19 RAPID TEST, COVR: NOT DETECTED
GLUCOSE BLD STRIP.AUTO-MCNC: 345 MG/DL (ref 65–117)
GLUCOSE BLD STRIP.AUTO-MCNC: 416 MG/DL (ref 65–117)
SERVICE CMNT-IMP: ABNORMAL
SERVICE CMNT-IMP: ABNORMAL
SOURCE, COVRS: NORMAL

## 2021-09-08 PROCEDURE — 77030004532 HC CATH ANGI DX IMP BSC -A: Performed by: INTERNAL MEDICINE

## 2021-09-08 PROCEDURE — 77030004533 HC CATH ANGI DX IMP BSC -B: Performed by: INTERNAL MEDICINE

## 2021-09-08 PROCEDURE — 74011250637 HC RX REV CODE- 250/637: Performed by: INTERNAL MEDICINE

## 2021-09-08 PROCEDURE — C1894 INTRO/SHEATH, NON-LASER: HCPCS | Performed by: INTERNAL MEDICINE

## 2021-09-08 PROCEDURE — 87635 SARS-COV-2 COVID-19 AMP PRB: CPT

## 2021-09-08 PROCEDURE — 93459 L HRT ART/GRFT ANGIO: CPT | Performed by: INTERNAL MEDICINE

## 2021-09-08 PROCEDURE — 76937 US GUIDE VASCULAR ACCESS: CPT | Performed by: INTERNAL MEDICINE

## 2021-09-08 PROCEDURE — 2709999900 HC NON-CHARGEABLE SUPPLY: Performed by: INTERNAL MEDICINE

## 2021-09-08 PROCEDURE — 74011250636 HC RX REV CODE- 250/636: Performed by: INTERNAL MEDICINE

## 2021-09-08 PROCEDURE — 99152 MOD SED SAME PHYS/QHP 5/>YRS: CPT | Performed by: INTERNAL MEDICINE

## 2021-09-08 PROCEDURE — 77030013744: Performed by: INTERNAL MEDICINE

## 2021-09-08 PROCEDURE — 99153 MOD SED SAME PHYS/QHP EA: CPT | Performed by: INTERNAL MEDICINE

## 2021-09-08 PROCEDURE — 74011636637 HC RX REV CODE- 636/637: Performed by: INTERNAL MEDICINE

## 2021-09-08 PROCEDURE — 82962 GLUCOSE BLOOD TEST: CPT

## 2021-09-08 PROCEDURE — 74011000636 HC RX REV CODE- 636: Performed by: INTERNAL MEDICINE

## 2021-09-08 PROCEDURE — 74011000250 HC RX REV CODE- 250: Performed by: INTERNAL MEDICINE

## 2021-09-08 RX ORDER — SODIUM CHLORIDE 0.9 % (FLUSH) 0.9 %
5-40 SYRINGE (ML) INJECTION AS NEEDED
Status: DISCONTINUED | OUTPATIENT
Start: 2021-09-08 | End: 2021-09-08 | Stop reason: HOSPADM

## 2021-09-08 RX ORDER — SODIUM CHLORIDE 9 MG/ML
100 INJECTION, SOLUTION INTRAVENOUS CONTINUOUS
Status: DISCONTINUED | OUTPATIENT
Start: 2021-09-08 | End: 2021-09-08 | Stop reason: HOSPADM

## 2021-09-08 RX ORDER — LIDOCAINE HYDROCHLORIDE 10 MG/ML
INJECTION INFILTRATION; PERINEURAL AS NEEDED
Status: DISCONTINUED | OUTPATIENT
Start: 2021-09-08 | End: 2021-09-08 | Stop reason: HOSPADM

## 2021-09-08 RX ORDER — SODIUM CHLORIDE 0.9 % (FLUSH) 0.9 %
5-40 SYRINGE (ML) INJECTION EVERY 8 HOURS
Status: DISCONTINUED | OUTPATIENT
Start: 2021-09-08 | End: 2021-09-08 | Stop reason: HOSPADM

## 2021-09-08 RX ORDER — ISOSORBIDE MONONITRATE 30 MG/1
30 TABLET, EXTENDED RELEASE ORAL DAILY
Qty: 90 TABLET | Refills: 3 | Status: SHIPPED | OUTPATIENT
Start: 2021-09-08 | End: 2021-11-29

## 2021-09-08 RX ORDER — ACETAMINOPHEN 325 MG/1
650 TABLET ORAL
Status: DISCONTINUED | OUTPATIENT
Start: 2021-09-08 | End: 2021-09-08 | Stop reason: HOSPADM

## 2021-09-08 RX ORDER — MIDAZOLAM HYDROCHLORIDE 1 MG/ML
INJECTION, SOLUTION INTRAMUSCULAR; INTRAVENOUS AS NEEDED
Status: DISCONTINUED | OUTPATIENT
Start: 2021-09-08 | End: 2021-09-08 | Stop reason: HOSPADM

## 2021-09-08 RX ORDER — SODIUM CHLORIDE 9 MG/ML
1000 INJECTION, SOLUTION INTRAVENOUS CONTINUOUS
Status: DISCONTINUED | OUTPATIENT
Start: 2021-09-08 | End: 2021-09-08

## 2021-09-08 RX ORDER — ISOSORBIDE MONONITRATE 30 MG/1
30 TABLET, EXTENDED RELEASE ORAL DAILY
Status: DISCONTINUED | OUTPATIENT
Start: 2021-09-08 | End: 2021-09-08 | Stop reason: HOSPADM

## 2021-09-08 RX ORDER — HYDRALAZINE HYDROCHLORIDE 20 MG/ML
20 INJECTION INTRAMUSCULAR; INTRAVENOUS ONCE
Status: COMPLETED | OUTPATIENT
Start: 2021-09-08 | End: 2021-09-08

## 2021-09-08 RX ORDER — FENTANYL CITRATE 50 UG/ML
INJECTION, SOLUTION INTRAMUSCULAR; INTRAVENOUS AS NEEDED
Status: DISCONTINUED | OUTPATIENT
Start: 2021-09-08 | End: 2021-09-08 | Stop reason: HOSPADM

## 2021-09-08 RX ADMIN — HUMAN INSULIN 4 UNITS: 100 INJECTION, SOLUTION SUBCUTANEOUS at 16:12

## 2021-09-08 RX ADMIN — SODIUM CHLORIDE 100 ML/HR: 9 INJECTION, SOLUTION INTRAVENOUS at 13:26

## 2021-09-08 RX ADMIN — HUMAN INSULIN 6 UNITS: 100 INJECTION, SOLUTION SUBCUTANEOUS at 13:40

## 2021-09-08 RX ADMIN — ISOSORBIDE MONONITRATE 30 MG: 30 TABLET, EXTENDED RELEASE ORAL at 16:59

## 2021-09-08 RX ADMIN — HYDRALAZINE HYDROCHLORIDE 20 MG: 20 INJECTION, SOLUTION INTRAMUSCULAR; INTRAVENOUS at 16:14

## 2021-09-08 NOTE — DISCHARGE INSTRUCTIONS
Luna Coronado MD    [address] [facility], [address] [facility]    Office [phone],[place] [phone]      Patient ID:  Jens Lemon  [id number]  72 y.o.  1950    Admit Date: 9/8/2021    Discharge Date: 9/8/2021     Admitting Physician: Luna Coronado MD     Discharge Physician: Luna Coronado MD    Admission Diagnoses:   CVD (cardiovascular disease) [I25.10]    Discharge Diagnoses: Active Problems:    * No active hospital problems. *      Discharge Condition: Good    Cardiology Procedures this Admission:  Diagnostic left heart catheterization    Disposition: home    Patient Instructions:         Reference discharge instructions provided by nursing for diet and activity. Follow-up with Dr Vlad Christianson as scheduled  Start Metformin 48 hours post cath  New Medicine - Imdur 30mg daily    Signed:  Luna Coronado MD  9/8/2021  5:22 PM    CARDIAC CATHETERIZATION/PCI DISCHARGE INSTRUCTIONS    It is normal to feel tired the first couple days. Take it easy and follow the physicians instructions. CHECK THE CATHETER INSERTION SITE DAILY:    You may shower 24 hours after the procedure, remove the bandage during showering. Wash with soap and water and pat dry. Gentle cleaning of the site with soap and water is sufficient, cover with a dry clean dressing or bandage. Do not apply creams or powders to the area. Do not sit in a bathtub or pool of water for 7 days or until wound has completely healed. Temporary bruising and discomfort is normal and may last a few weeks. You may have a  formation of a small lump at the site which may last up to 6 weeks. CALL THE PHYSICIANS:    If the site becomes red, swollen or feels warm to the touch  If there is bleeding or drainage or if there is unusual pain at the groin or down the leg. If there is any bleeding, lie down, apply pressure or have someone apply pressure with a clean cloth until the bleeding stops.    If the bleeding continues, call 911 to be transported to the hospital.  DO NOT DRIVE YOURSELF, [phone]. ACTIVITY:    For the first 24-48 hours or as instructed by the physician:  No lifting, pushing or pulling over 10 pounds and no straining the insertion site. Do not life grocery bags or the garbage can, do not run the vacuum  or  for 7 days. Start with short walks as in the hospital and gradually increase as tolerated each day. It is recommended to walk 30 minutes 5-7 days per week. Follow your physicians instructions on activity. Avoid walking outside in extremes of heat or cold. Walk inside when it is cold and windy or hot and humid. Things to keep in mind:  No driving for at least 24 hours, or as designated by your physician. Limit the number of times you go up and down the stairs  Take rests and pace yourself with activity. Be careful and do not strain with bowel movements. MEDICATIONS:    Take all medications as prescribed  Call your physician if you have any questions  Keep an updated list of your medications with you at all times and give a list to your physician and pharmacist        SIGNS AND SYMPTOMS:    Be cautious of symptoms of angina or recurrent symptoms such as chest discomfort, unusual shortness of breath or fatigue. These could be symptoms of restenosis, a new blockage or a heart attack. If your symptoms are relieved with rest it is still recommended that you notify your physician of recurrent chest pain or discomfort. FOR CHEST PAIN or symptoms of angina not relieved with rest:  If the discomfort is not relieved with rest, and you have been prescribed Nitroglycerin, take as directed (taken under the tongue, one at a time 5 minutes apart for a total of 3 doses). If the discomfort is not relieved after the 3rd nitroglycerin, call 911.   If you have not been prescribed Nitroglycerin  and your chest discomfort is not relieved with rest, call 911. AFTER CARE:    Follow up with your physician as instructed. Follow a heart healthy diet with proper portion control, daily stress management, daily exercise, blood pressure and cholesterol control , and smoking cessation.

## 2021-09-08 NOTE — PROGRESS NOTES
Transfer to [facility] from Procedure Area    Verbal report given to contreras dey on Gosia Mittal being transferred to Cardiac Cath Lab RR for routine progression of care   Patient is post [facility] procedure. Patient stable upon transfer to . Report consisted of patients Situation, Background, Assessment and   Recommendations(SBAR). Information from the following report(s) SBAR was reviewed with the receiving nurse. Opportunity for questions and clarification was provided. Patient medicated during procedure with orders obtained and verified by Dr. Marcelino West. Refer to patient PROCEDURE REPORT for vital signs, assessment, status, and response during procedure.

## 2021-09-08 NOTE — PROGRESS NOTES
Alondra Kendrick RN notified of labs and no COVID results, instructed to call Dr Kyle Ruiz called about pt's pre procedure labs results, and that they were  drawn yesterday for cath today, also COVID test results not back, instructed to call Dr Maxine Gonzalez.  Dr Maxine Gonzalez notified of the above, informed me he will get with his nurse that will take care of this and get back to us if need to reschedule.   [address] manager aware of the above

## 2021-09-08 NOTE — PROGRESS NOTES
Cardiac Cath Lab Recovery Arrival Note:      Hira Rice arrived to Cardiac Cath Lab, Recovery Area. Staff introduced to patient. Patient identifiers verified with NAME and DATE OF BIRTH. Procedure verified with patient. Consent forms reviewed and signed by patient or authorized representative and verified. Allergies verified. Patient and family oriented to department. Patient and family informed of procedure and plan of care. Questions answered with review. Patient prepped for procedure, per orders from physician, prior to arrival.    Patient on cardiac monitor, non-invasive blood pressure, SPO2 monitor. On room air. Patient is A&Ox 4. Patient reports no pain. Patient in stretcher, in low position, with side rails up, call bell within reach, patient instructed to call if assistance as needed. Patient prep in: [id number]. Patient family has pager # [phone]  Family in: [phone]. Prep by: DD    1421 Dr. Nicholas Moffett notified of elevated blood glucose of 416 and elevated BP. Regular insulin ordered sc. Patient stated he needed to void so stood to use the urinal and voided 150 ccs urine.

## 2021-09-08 NOTE — PROGRESS NOTES
Called by cath lab pre-post with labs results and pending Covid test  Pt was supposed to get testing prior but had not yesterday after the holiday so office nurse called pt to get testintg  Covid still pending  Labs notable for high glucose ( pt is holding his Metformin) and elevated creatinine to 1.7 , baseline was 1.3 and sees Renal and is on diuretics  Suggest rapid Covid test if other not available and do JACINTA protocol  Pt with severe DONALDSON and fatigue and hx of CABG  Have asked office nurse to contact cath lab as I am at out of town office clinic      Lab Results   Component Value Date/Time    Sodium 132 (L) 09/07/2021 11:35 AM    Potassium 5.8 (H) 09/07/2021 11:35 AM    Chloride 103 09/07/2021 11:35 AM    CO2 23 09/07/2021 11:35 AM    Anion gap 6 09/07/2021 11:35 AM    Glucose 455 (H) 09/07/2021 11:35 AM    BUN 23 (H) 09/07/2021 11:35 AM    Creatinine 1.70 (H) 09/07/2021 11:35 AM    BUN/Creatinine ratio 14 09/07/2021 11:35 AM    GFR est AA 49 (L) 09/07/2021 11:35 AM    GFR est non-AA 40 (L) 09/07/2021 11:35 AM    Calcium 9.0 09/07/2021 11:35 AM    Bilirubin, total 0.3 05/20/2021 08:43 AM    Alk. phosphatase 103 05/20/2021 08:43 AM    Protein, total 7.7 05/20/2021 08:43 AM    Albumin 4.1 05/20/2021 08:43 AM    Globulin 3.6 05/20/2021 08:43 AM    A-G Ratio 1.1 05/20/2021 08:43 AM    ALT (SGPT) 36 05/20/2021 08:43 AM    AST (SGOT) 24 05/20/2021 08:43 AM       Lab Results   Component Value Date/Time    WBC 5.8 09/07/2021 11:35 AM    HGB 11.4 (L) 09/07/2021 11:35 AM    HCT 37.5 09/07/2021 11:35 AM    PLATELET 291 24/72/8973 11:35 AM    MCV 92.1 09/07/2021 11:35 AM       Echo 1-15-18 EF 63% 63 %. Mild LAE, MAC, mild AS mean 8, HUY 1.8 mild TR  CABG x 3 7-9-18= LIMA to LAD, SVG-OM, SVG-dRCA   Echo 6-22-18 EF 55-60%, mod MAC, aortic sclerosis without stenosis , mild TR  LISA 6-18-18 4 minutes +moderate hypokinesis of the mid anteroseptal, entire inferior, apical septal, and apical wall(s).  With exercise a mild reduction in LV function. PCI 11-11-16 JEREMY to mLAD 95% JEREMY to OM1 90%     03/15/21    ECHO ADULT COMPLETE 03/27/2021 3/27/2021    Interpretation Summary  · LV: Estimated LVEF is 55 - 60%. Biplane method used to measure ejection fraction. Normal cavity size and systolic function (ejection fraction normal). Mild concentric hypertrophy. Wall motion: normal. Mild (grade 1) left ventricular diastolic dysfunction. · AV: Aortic valve leaflet calcification present. Mild aortic valve regurgitation is present. · MV: Mitral valve thickening. Mitral valve leaflet calcification. Moderate mitral annular calcification. Mild mitral valve regurgitation is present. · TV: Mild tricuspid valve regurgitation is present. · PV: Mild pulmonic valve regurgitation is present. · LA: Mildly dilated left atrium. · PA: Pulmonary arterial systolic pressure is 33 mmHg. Signed by: Hoda Joy MD on 3/27/2021  7:40 PM    03/15/21    NUCLEAR CARDIAC STRESS TEST 03/18/2021 3/19/2021    Interpretation Summary  · SPECT: Left ventricular function post-stress was normal. Calculated ejection fraction is 64%. There is no evidence of transient ischemic dilation (TID). The TID ratio is 1.07.  · Baseline ECG: Normal sinus rhythm, non-specific ST-T wave abnormalities. · SPECT: Myocardial perfusion imaging defect 1: There is a defect that is medium in size present in the apical and inferolateral location(s) that is reversible. The defect appears to be ischemia. Perfusion defect was visually and quantitatively present. Myocardial perfusion imaging defect 2: There is a defect that is medium in size affecting the mid and inferolateral location(s) that is non-reversible. The defect appears to be infarction.   · SPECT: Left ventricular perfusion is abnormal. an intermediate risk stress test.    Signed by: Hoda Joy MD on 3/18/2021 11:12 AM

## 2021-09-08 NOTE — PROGRESS NOTES
TRANSFER - IN REPORT:    Verbal report received from Jeniffer Bose 411  on Vel Vazquez  being received from cardiac catheterization for routine progression of care. Report consisted of patients Situation, Background, Assessment and Recommendations(SBAR). Information from the following report(s) Procedure Summary was reviewed with the receiving clinician. Opportunity for questions and clarification was provided. Assessment completed upon patients arrival to [address] [place] [address] and care assumed. Cardiac Cath Lab Recovery Arrival Note:    Vel Vazquez arrived to [facility] recovery area. Patient procedure= cardiac cathetrization. Patient on cardiac monitor, non-invasive blood pressure, SPO2 monitor. On room air  IV  of Normal saline on pump at 100 ml/hr. Patient status doing well without problems. Patient is A&Ox 4. Patient reports no pain. PROCEDURE SITE CHECK:    Procedure site:without any bleeding and no hematoma, no pain/discomfort reported at procedure site. No change in patient status. Continue to monitor patient and status.

## 2021-09-08 NOTE — PROGRESS NOTES
Patient standing at bedside trying to urinate. Unable to do so. Heart cbdt=411 Sinus tachycardia/SVT. Dr. Mar Screen notified and patient straight cathed for 300 mls of urine. Heart rate came down to 96. Patient felt better after being being straight cathed. 1745 Postoperative discharge instructions reviewed with patient and all questions answered. Patient verbalizes understanding. 1750 Patient dressed self without dizziness and no difficulty. Steady on his feet.

## 2021-09-08 NOTE — PROCEDURES
BRIEF PROCEDURE NOTE    Date of Procedure: 9/8/2021   Preoperative Diagnosis: CAD/Hx of CABG/Dyspnea  Postoperative Diagnosis:Singificant 3 V dz; 2 of 3 grafts patent; LIMA to LAD; SVG to RCA patent; Unable to visualize SVG to OM ( aortogram also performed)- probably occluded  Procedure: Left heart cath, LV angiography, coronary angiography  Interventional Cardiologist: Lashell Cuevas MD  Assistant : none  Anesthesia: local + IV sedation  Estimated Blood Loss: Minimal  Findings:     R CFA access - 6 F sheath    L Main: Med to Large; Distal 60-70%     LAD: Ostial 70%; Mid 90%; Competitive flow from LIMA to LAD; Small D1/D2    LCflex: Ostial/prox 80%; Med; OM1 - small - prox diffuse dz/OM2 - small to med/OM3 - small - aneurysmal segment distal vessel - ? Bypassed vessel . NICOLE 3 flow in distal LCflex and OM    RCA:  Med; Ostial/Prox 90%    LIMA to LAD - very tortuous; patent; Native vessel patent    SVG to RCA  ( RCB catheter)- patent; Nativel vessel small patent; backfilling of native RCA noted    SVG to OM - multiple catheters used - [id number] - unable to visualize; Aortogram performed - not visualised - prob TO. Also no competitive flow noted in OM on injecting native LCA    LVEDP: 8 mm Hg    LVEF: Not assessed    No significant gradient across aortic valve. Specimens Removed : None    Complications: None    Closure Device: R CFA - manual      See full cath note. Complications: none    Lashell Cuevas MD     Recommend: Aggressive medical management; Pt received 90 cc of contrast - CR 1.7; IV fluids given; If he continues to have symptoms - will need LM bifurcation stenting. Pt's BSugar's in 400's. Insulin given in recovery. Advised to follow up with PCP    D/w wife by Geeta Lou MD, [facility]

## 2021-09-09 ENCOUNTER — TELEPHONE (OUTPATIENT)
Dept: CARDIAC REHAB | Age: 71
End: 2021-09-09

## 2021-09-09 LAB
SARS-COV-2, XPLCVT: NOT DETECTED
SOURCE, COVRS: NORMAL

## 2021-09-09 NOTE — TELEPHONE ENCOUNTER
9/9/2021 Cardiac Rehab: Called Mr. Ramila Fletcher  to discuss participation in the Cardiac Rehab Program following a cardiac cath (total occlusion of SVG to OM). I spoke with his wife since she \"takes all the calls for him\". Introduced myself and the reason for my call. After establishing that cardiac rehab is voluntary, she requested a call back because she is on the other line with another call. Agreed to continue to follow for enrollment the week of 9/10/2021.  Emily Anguiano RN

## 2021-09-10 ENCOUNTER — TELEPHONE (OUTPATIENT)
Dept: CARDIAC REHAB | Age: 71
End: 2021-09-10

## 2021-09-10 DIAGNOSIS — E11.65 TYPE 2 DIABETES MELLITUS WITH HYPERGLYCEMIA, WITHOUT LONG-TERM CURRENT USE OF INSULIN (HCC): ICD-10-CM

## 2021-09-10 RX ORDER — SITAGLIPTIN 50 MG/1
TABLET, FILM COATED ORAL
Qty: 90 TABLET | Refills: 0 | Status: SHIPPED | OUTPATIENT
Start: 2021-09-10 | End: 2021-11-29

## 2021-09-10 RX ORDER — METFORMIN HYDROCHLORIDE 1000 MG/1
TABLET ORAL
Qty: 180 TABLET | Refills: 0 | Status: SHIPPED | OUTPATIENT
Start: 2021-09-10 | End: 2022-03-28

## 2021-09-10 RX ORDER — GLIPIZIDE 5 MG/1
TABLET ORAL
Qty: 180 TABLET | Refills: 0 | Status: SHIPPED | OUTPATIENT
Start: 2021-09-10 | End: 2021-11-29

## 2021-09-10 NOTE — TELEPHONE ENCOUNTER
9/10/2021 Cardiac Rehab: Called Mr. Robb Najjar  to discuss participation in the Cardiac Rehab Program following a cardiac cath (total occlusion of SVG to OM). Spoke with his wife who said she was out and would like to call us back Monday. Javier Stover RN    9/9/2021 Cardiac Rehab: Called Mr. Robb Najjar  to discuss participation in the Cardiac Rehab Program following a cardiac cath (total occlusion of SVG to OM). I spoke with his wife since she \"takes all the calls for him\". Introduced myself and the reason for my call. After establishing that cardiac rehab is voluntary, she requested a call back because she is on the other line with another call. Agreed to continue to follow for enrollment the week of 9/10/2021.  Cortland Dance, RN

## 2021-09-22 ENCOUNTER — TELEPHONE (OUTPATIENT)
Dept: CARDIAC REHAB | Age: 71
End: 2021-09-22

## 2021-09-22 NOTE — TELEPHONE ENCOUNTER
9/22/2021 Cardiac Rehab: Spoke with Erika Kendall's wife. She believes this is something that the physicians would like her  to do however she is out and not able to schedule an appointment. She agreed to call us back at a more convenient time for her. Will await her call. Alondra Olivas RN     9/10/2021 Cardiac Rehab: Called Mr. Neal Kendall  to discuss participation in the Cardiac Rehab Program following a cardiac cath (total occlusion of SVG to OM).  Spoke with his wife who said she was out and would like to call us back Monday. Ismael Murray RN     9/9/2021 Cardiac Rehab: Called Mr. Neal Kendall  to discuss participation in the Cardiac Rehab Program following a cardiac cath (total occlusion of SVG to OM).  I spoke with his wife since she \"takes all the calls for him\". Introduced myself and the reason for my call. After establishing that cardiac rehab is voluntary, she requested a call back because she is on the other line with another call.  Agreed to continue to follow for enrollment the week of 9/10/2021. Heidy Estrada RN

## 2021-09-23 ENCOUNTER — OFFICE VISIT (OUTPATIENT)
Dept: FAMILY MEDICINE CLINIC | Age: 71
End: 2021-09-23
Payer: MEDICARE

## 2021-09-23 VITALS
HEART RATE: 63 BPM | HEIGHT: 69 IN | BODY MASS INDEX: 20.47 KG/M2 | RESPIRATION RATE: 14 BRPM | OXYGEN SATURATION: 98 % | WEIGHT: 138.2 LBS | DIASTOLIC BLOOD PRESSURE: 65 MMHG | TEMPERATURE: 97.2 F | SYSTOLIC BLOOD PRESSURE: 177 MMHG

## 2021-09-23 DIAGNOSIS — I10 HYPERTENSION, ESSENTIAL: ICD-10-CM

## 2021-09-23 DIAGNOSIS — Z23 ENCOUNTER FOR IMMUNIZATION: ICD-10-CM

## 2021-09-23 DIAGNOSIS — Z95.1 S/P CABG X 3: ICD-10-CM

## 2021-09-23 DIAGNOSIS — I35.0 NONRHEUMATIC AORTIC VALVE STENOSIS: ICD-10-CM

## 2021-09-23 DIAGNOSIS — R06.09 DOE (DYSPNEA ON EXERTION): ICD-10-CM

## 2021-09-23 DIAGNOSIS — E11.65 TYPE 2 DIABETES MELLITUS WITH HYPERGLYCEMIA, WITHOUT LONG-TERM CURRENT USE OF INSULIN (HCC): Primary | ICD-10-CM

## 2021-09-23 DIAGNOSIS — I25.10 CORONARY ARTERY DISEASE INVOLVING NATIVE CORONARY ARTERY OF NATIVE HEART WITHOUT ANGINA PECTORIS: ICD-10-CM

## 2021-09-23 DIAGNOSIS — E78.5 DYSLIPIDEMIA, GOAL LDL BELOW 100: ICD-10-CM

## 2021-09-23 DIAGNOSIS — N18.30 CKD STAGE 3 DUE TO TYPE 2 DIABETES MELLITUS (HCC): ICD-10-CM

## 2021-09-23 DIAGNOSIS — E11.22 CKD STAGE 3 DUE TO TYPE 2 DIABETES MELLITUS (HCC): ICD-10-CM

## 2021-09-23 LAB
ALBUMIN SERPL-MCNC: 4.2 G/DL (ref 3.5–5)
ALBUMIN/GLOB SERPL: 1 {RATIO} (ref 1.1–2.2)
ALP SERPL-CCNC: 107 U/L (ref 45–117)
ALT SERPL-CCNC: 42 U/L (ref 12–78)
ANION GAP SERPL CALC-SCNC: 3 MMOL/L (ref 5–15)
AST SERPL-CCNC: 34 U/L (ref 15–37)
BASOPHILS # BLD: 0.2 K/UL (ref 0–0.1)
BASOPHILS NFR BLD: 2 % (ref 0–1)
BILIRUB SERPL-MCNC: 0.4 MG/DL (ref 0.2–1)
BUN SERPL-MCNC: 30 MG/DL (ref 6–20)
BUN/CREAT SERPL: 16 (ref 12–20)
CALCIUM SERPL-MCNC: 9.8 MG/DL (ref 8.5–10.1)
CHLORIDE SERPL-SCNC: 109 MMOL/L (ref 97–108)
CHOLEST SERPL-MCNC: 169 MG/DL
CO2 SERPL-SCNC: 24 MMOL/L (ref 21–32)
CREAT SERPL-MCNC: 1.91 MG/DL (ref 0.7–1.3)
CREAT UR-MCNC: 78.6 MG/DL
DIFFERENTIAL METHOD BLD: ABNORMAL
EOSINOPHIL # BLD: 0.3 K/UL (ref 0–0.4)
EOSINOPHIL NFR BLD: 3 % (ref 0–7)
ERYTHROCYTE [DISTWIDTH] IN BLOOD BY AUTOMATED COUNT: 14 % (ref 11.5–14.5)
EST. AVERAGE GLUCOSE BLD GHB EST-MCNC: 171 MG/DL
GLOBULIN SER CALC-MCNC: 4.2 G/DL (ref 2–4)
GLUCOSE SERPL-MCNC: 77 MG/DL (ref 65–100)
HBA1C MFR BLD: 7.6 % (ref 4–5.6)
HCT VFR BLD AUTO: 42.3 % (ref 36.6–50.3)
HDLC SERPL-MCNC: 39 MG/DL
HDLC SERPL: 4.3 {RATIO} (ref 0–5)
HGB BLD-MCNC: 12.6 G/DL (ref 12.1–17)
IMM GRANULOCYTES # BLD AUTO: 0 K/UL (ref 0–0.04)
IMM GRANULOCYTES NFR BLD AUTO: 0 % (ref 0–0.5)
LDLC SERPL CALC-MCNC: 83 MG/DL (ref 0–100)
LYMPHOCYTES # BLD: 2.6 K/UL (ref 0.8–3.5)
LYMPHOCYTES NFR BLD: 27 % (ref 12–49)
MCH RBC QN AUTO: 27.2 PG (ref 26–34)
MCHC RBC AUTO-ENTMCNC: 29.8 G/DL (ref 30–36.5)
MCV RBC AUTO: 91.4 FL (ref 80–99)
MICROALBUMIN UR-MCNC: 9.81 MG/DL
MICROALBUMIN/CREAT UR-RTO: 125 MG/G (ref 0–30)
MONOCYTES # BLD: 0.8 K/UL (ref 0–1)
MONOCYTES NFR BLD: 8 % (ref 5–13)
NEUTS SEG # BLD: 5.7 K/UL (ref 1.8–8)
NEUTS SEG NFR BLD: 60 % (ref 32–75)
NRBC # BLD: 0 K/UL (ref 0–0.01)
NRBC BLD-RTO: 0 PER 100 WBC
PLATELET # BLD AUTO: 305 K/UL (ref 150–400)
PMV BLD AUTO: 12.4 FL (ref 8.9–12.9)
POTASSIUM SERPL-SCNC: 5.5 MMOL/L (ref 3.5–5.1)
PROT SERPL-MCNC: 8.4 G/DL (ref 6.4–8.2)
RBC # BLD AUTO: 4.63 M/UL (ref 4.1–5.7)
SODIUM SERPL-SCNC: 136 MMOL/L (ref 136–145)
TRIGL SERPL-MCNC: 235 MG/DL (ref ?–150)
VLDLC SERPL CALC-MCNC: 47 MG/DL
WBC # BLD AUTO: 9.7 K/UL (ref 4.1–11.1)

## 2021-09-23 PROCEDURE — 3017F COLORECTAL CA SCREEN DOC REV: CPT | Performed by: FAMILY MEDICINE

## 2021-09-23 PROCEDURE — 90694 VACC AIIV4 NO PRSRV 0.5ML IM: CPT | Performed by: FAMILY MEDICINE

## 2021-09-23 PROCEDURE — 3044F HG A1C LEVEL LT 7.0%: CPT | Performed by: FAMILY MEDICINE

## 2021-09-23 PROCEDURE — 99215 OFFICE O/P EST HI 40 MIN: CPT | Performed by: FAMILY MEDICINE

## 2021-09-23 PROCEDURE — G0008 ADMIN INFLUENZA VIRUS VAC: HCPCS | Performed by: FAMILY MEDICINE

## 2021-09-23 PROCEDURE — G8420 CALC BMI NORM PARAMETERS: HCPCS | Performed by: FAMILY MEDICINE

## 2021-09-23 PROCEDURE — G8536 NO DOC ELDER MAL SCRN: HCPCS | Performed by: FAMILY MEDICINE

## 2021-09-23 PROCEDURE — G8753 SYS BP > OR = 140: HCPCS | Performed by: FAMILY MEDICINE

## 2021-09-23 PROCEDURE — 2022F DILAT RTA XM EVC RTNOPTHY: CPT | Performed by: FAMILY MEDICINE

## 2021-09-23 PROCEDURE — G8754 DIAS BP LESS 90: HCPCS | Performed by: FAMILY MEDICINE

## 2021-09-23 PROCEDURE — G8510 SCR DEP NEG, NO PLAN REQD: HCPCS | Performed by: FAMILY MEDICINE

## 2021-09-23 PROCEDURE — 1101F PT FALLS ASSESS-DOCD LE1/YR: CPT | Performed by: FAMILY MEDICINE

## 2021-09-23 PROCEDURE — G8427 DOCREV CUR MEDS BY ELIG CLIN: HCPCS | Performed by: FAMILY MEDICINE

## 2021-09-23 RX ORDER — METOPROLOL TARTRATE 25 MG/1
50 TABLET, FILM COATED ORAL 2 TIMES DAILY
COMMUNITY
End: 2021-11-22

## 2021-09-23 NOTE — PATIENT INSTRUCTIONS
Kidney Disease and High Blood Pressure: Care Instructions  Overview     Long-term (chronic) kidney disease happens when the kidneys cannot remove waste and keep your body's fluids and chemicals in balance. Usually, the kidneys remove waste from the blood through the urine. When the kidneys are not working well, waste can build up so much that it poisons the body. Kidney disease can make you very tired. It also can cause swelling, or edema, in your legs or other areas of your body. High blood pressure is one of the major causes of chronic kidney disease. And kidney disease can also cause high blood pressure. No matter which came first, having high blood pressure damages the tiny blood vessels in the kidneys. If you have high blood pressure, it is important to lower it. There are many things you can do to lower your blood pressure, which may help slow or stop the damage to your kidneys. Follow-up care is a key part of your treatment and safety. Be sure to make and go to all appointments, and call your doctor if you are having problems. It's also a good idea to know your test results and keep a list of the medicines you take. How can you care for yourself at home? · Be safe with medicines. Take your medicines exactly as prescribed. Call your doctor if you have any problems with your medicine. You will probably need more than one medicine to lower your blood pressure. You will get more details on the specific medicines your doctor prescribes. · Work with your doctor and a dietitian to plan meals that have the right amount of nutrients for you. You will probably have to limit salt, fluids, and protein. · Stay at a healthy weight. This is very important if you put on weight around the waist. Losing even 10 pounds can help you lower your blood pressure. · Manage other health problems such as diabetes and high cholesterol.  You can help lower your risk for heart disease and blood vessel problems with a healthy lifestyle along with medicines. · Do not take ibuprofen (Advil, Motrin) or naproxen (Aleve), or similar medicines, unless your doctor tells you to. They may make chronic kidney disease worse. It is okay to take acetaminophen (Tylenol). · If your doctor recommends it, get more exercise. Walking is a good choice. Bit by bit, increase the amount you walk every day. Try for at least 30 minutes on most days of the week. You also may want to swim, bike, or do other activities. · Limit or avoid alcohol. Talk to your doctor about whether you can drink any alcohol. · Do not smoke or allow others to smoke around you. If you need help quitting, talk to your doctor about stop-smoking programs and medicines. These can increase your chances of quitting for good. When should you call for help? Call 911 anytime you think you may need emergency care. For example, call if:    · You passed out (lost consciousness). Call your doctor now or seek immediate medical care if:    · You have new or worse nausea and vomiting.     · You have much less urine than normal, or you have no urine.     · You are feeling confused or cannot think clearly.     · You have new or more blood in your urine.     · You have new swelling.     · You are dizzy or lightheaded, or you feel like you may faint. Watch closely for changes in your health, and be sure to contact your doctor if:    · You do not get better as expected. Where can you learn more? Go to http://www.gray.com/  Enter G535 in the search box to learn more about \"Kidney Disease and High Blood Pressure: Care Instructions. \"  Current as of: December 17, 2020               Content Version: 13.0  © 5175-0996 [employer], [employer]orporated. Care instructions adapted under license by [employer] (which disclaims liability or warranty for this information).  If you have questions about a medical condition or this instruction, always ask your healthcare professional. [address] any warranty or liability for your use of this information.

## 2021-09-23 NOTE — PROGRESS NOTES
Chief Complaint   Patient presents with    Diabetes     follow up         1. \"Have you been to the ER, urgent care clinic since your last visit? Hospitalized since your last visit? \" Yes When: yes [facility] Reason for visit: heart cathertrization    2. \"Have you seen or consulted any other health care providers outside of the [address] [place] [address] since your last visit? \" No     3. For patients over 45: Has the patient had a colonoscopy?  No         3 most recent PHQ Screens 9/23/2021   Little interest or pleasure in doing things Not at all   Feeling down, depressed, irritable, or hopeless Not at all   Total Score PHQ 2 0       Health Maintenance Due   Topic Date Due    Eye Exam Retinal or Dilated  08/01/2021    Flu Vaccine (1) 09/01/2021     Pt didn't have an eye exam yet

## 2021-09-23 NOTE — PROGRESS NOTES
HISTORY OF PRESENT ILLNESS  Scott Torres is a 72 y.o. male. Patient was seen today for 4 months follow-up appointment on diabetes, CAD, hypertension, CKD and fatigue. He was also seen to discuss his recent cardiac catheterization done on09/08/21  HPI   Cardiovascular Review  The patient has hypertension, hyperlipidemia, coronary artery disease, status post coronary artery stenting and had 2 vessel PCI on 11/11/2016, s/p CABG x 3 on 07/09/2018. Pili Ibarra reports taking medications as instructed, no medication side effects noted, notes stable dyspnea on exertion, no change, no swelling of ankles, no orthostatic dizziness or lightheadedness, no orthopnea or paroxysmal nocturnal dyspnea, no palpitations, no intermittent claudication symptoms.  Diet and Lifestyle: generally follows a low fat low cholesterol diet, generally follows a low sodium diet, nonsmoker.  Lab review: labs reviewed and discussed with patient.  Follows Jinny  Had echocardiogram and nuclear cardiac stress test on March 15, 2021. Stress test was significant for  · Myocardial perfusion imaging defect 1: There is a defect that is medium in size present in the apical and inferolateral location(s) that is reversible. The defect appears to be ischemia. Perfusion defect was visually and quantitatively present. Myocardial perfusion imaging defect 2: There is a defect that is medium in size affecting the mid and inferolateral location(s) that is non-reversible. The defect appears to be infarction. He had left heart catheterization with coronary angiogram on 09/08/21 that showed significant three-vessel disease. 2 out of 3 grafts patent with occluded SVG to    Cardiology has decided to manage it with optimal medical management.       Medicines:ASA, Metoprolol 25 mg bid ( recently decreased by cardiology due to fatigue), Candesartan 32 mg ( on hold), Hctz 25 mg, Atorvastatin 20 mg,Lasix 20 mg and Imdur 30 mg ER   Endocrine Review  He is seen for diabetes.  Since last visit he reports: no polyuria or polydipsia, no chest pain, dyspnea or TIA's, no numbness, tingling or pain in extremities, no unusual visual symptoms, weight has decreased, no significant changes.  Home glucose monitoring: is performed sporadically, nonfasting values range 160-200  He is checking his sugars one per day.  He reports medication compliance: compliant all of the time.  Medication side effects: none.  Diabetic diet compliance: noncompliant some of the time.  Lab review: labs reviewed and discussed with patient.  Eye exam: UTD.    On  Glipizide 5 mg BID (was decreased after last blood work report )and Januvia 50 mg, Metformin 1 gm bid. His blood sugar was very high when he was in the hospital and was given insulin. Lab Results   Component Value Date/Time    Hemoglobin A1c 6.5 (H) 05/20/2021 08:43 AM       CKD:  Stage of Chronic Kidney Disease III exhibited by:  Symptoms:none  Labs:  Lab Results   Component Value Date/Time    Sodium 132 (L) 09/07/2021 11:35 AM    Potassium 5.8 (H) 09/07/2021 11:35 AM    Chloride 103 09/07/2021 11:35 AM    CO2 23 09/07/2021 11:35 AM    Anion gap 6 09/07/2021 11:35 AM    Glucose 455 (H) 09/07/2021 11:35 AM    BUN 23 (H) 09/07/2021 11:35 AM    Creatinine 1.70 (H) 09/07/2021 11:35 AM    BUN/Creatinine ratio 14 09/07/2021 11:35 AM    GFR est AA 49 (L) 09/07/2021 11:35 AM    GFR est non-AA 40 (L) 09/07/2021 11:35 AM    Calcium 9.0 09/07/2021 11:35 AM       Was referred to nephrologist Dr Tayla Dukes, was recommended to change HCTZ 50 mg to 25 mg and to add Lasix 20 mg hyperkalemia.since recent hospital admission, his candesartan is on hold due to TANNER and hyperkalemia. Review of Systems   Constitutional: Positive for malaise/fatigue. Negative for chills and fever. HENT: Negative for congestion, ear pain, sore throat and tinnitus. Eyes: Negative for blurred vision, double vision, pain and discharge.    Respiratory: Negative for cough, shortness of breath and wheezing. Cardiovascular: Negative for chest pain, palpitations and leg swelling. Gastrointestinal: Negative for abdominal pain, blood in stool, constipation, diarrhea, nausea and vomiting. Genitourinary: Negative for dysuria, frequency, hematuria and urgency. Musculoskeletal: Negative for back pain, joint pain and myalgias. Skin: Negative for rash. Neurological: Negative for dizziness, tremors, seizures and headaches. Endo/Heme/Allergies: Negative for polydipsia. Does not bruise/bleed easily. Psychiatric/Behavioral: Negative for depression and substance abuse. The patient is not nervous/anxious. Physical Exam  Vitals and nursing note reviewed. Constitutional:       Appearance: He is well-developed. He is not diaphoretic. HENT:      Head: Normocephalic and atraumatic. Right Ear: External ear normal.      Mouth/Throat:      Pharynx: No oropharyngeal exudate. Eyes:      General: No scleral icterus. Conjunctiva/sclera: Conjunctivae normal.      Pupils: Pupils are equal, round, and reactive to light. Neck:      Thyroid: No thyromegaly. Vascular: No JVD. Cardiovascular:      Rate and Rhythm: Normal rate and regular rhythm. Heart sounds: Normal heart sounds. No murmur heard. Pulmonary:      Effort: Pulmonary effort is normal.      Breath sounds: Normal breath sounds. No wheezing. Abdominal:      General: Bowel sounds are normal. There is no distension. Palpations: Abdomen is soft. There is no mass. Musculoskeletal:         General: No tenderness. Normal range of motion. Cervical back: Normal range of motion and neck supple. Lymphadenopathy:      Cervical: No cervical adenopathy. Skin:     General: Skin is warm and dry. Findings: No rash. Neurological:      Mental Status: He is alert and oriented to person, place, and time. Cranial Nerves: No cranial nerve deficit. Deep Tendon Reflexes: Reflexes are normal and symmetric. Reflexes normal.         ASSESSMENT and PLAN  Diagnoses and all orders for this visit:    1. Type 2 diabetes mellitus with hyperglycemia, without long-term current use of insulin (HCC)  -     MICROALBUMIN, UR, RAND W/ MICROALB/CREAT RATIO; Future  -     HEMOGLOBIN A1C WITH EAG; Future  -     LIPID PANEL; Future  -     METABOLIC PANEL, COMPREHENSIVE; Future  -     empagliflozin (JARDIANCE) 10 mg tablet; Take 1 Tablet by mouth daily. 2. CKD stage 3 due to type 2 diabetes mellitus (HCC)  -     METABOLIC PANEL, COMPREHENSIVE; Future  -     CBC WITH AUTOMATED DIFF; Future    3. Encounter for immunization  -     FLU (FLUAD QUAD INFLUENZA VACCINE,QUAD,ADJUVANTED)  -     ADMIN INFLUENZA VIRUS VAC    4. S/P CABG x 3  -     LIPID PANEL; Future    5. Nonrheumatic aortic valve stenosis  -     LIPID PANEL; Future    6. Coronary artery disease involving native coronary artery of native heart without angina pectoris  -     LIPID PANEL; Future    7. Dyslipidemia, goal LDL below 100  -     LIPID PANEL; Future    8. Hypertension, essential  -     METABOLIC PANEL, COMPREHENSIVE; Future    9. DONALDSON (dyspnea on exertion)  -     CBC WITH AUTOMATED DIFF; Future      Patient's blood pressure very high in office. Risk candesartan is on hold and metoprolol dose has also been decreased recently. Recommended to resume back on metoprolol 50 mg and added Jardiance for better control of blood sugar as well as blood pressure. We will do follow-up blood work and will route results to cardiology and nephrology. Discussed lifestyle issues and health guidance given  Patient was given an after visit summary which includes diagnoses, vital signs, current medications, instructions and references & authorized prescriptions . Results of labs will be conveyed to patient, once available. Pt verbalized instructions I provided and expressed understanding of discussion that was held today.   Follow-up and Dispositions    · Return in about 4 months (around 1/23/2022) for fasting, medicare wellness, physical.         Please note that this dictation was completed with [employer], the computer voice recognition software. Quite often unanticipated grammatical, syntax, homophones, and other interpretive errors are inadvertently transcribed by the computer software. Please disregard these errors. Please excuse any errors that have escaped final proofreading. Thank you.

## 2021-09-24 NOTE — PROGRESS NOTES
Called patient. spoke to wife as per PHI Two patient identifiers verified. Discussed lab results per provider's note. she Verbalized understanding.

## 2021-09-24 NOTE — PROGRESS NOTES
Rosalie Current,  I have reviewed your results. Blood count is normal, urine is positive for protein but showing significant improvement from previous result. A1c is up, 7.6. As we discussed please start on new medication and please continue taking your current glipizide, Januvia. Recommend to decrease dose of Metformin from 1000mg to 500 mg due to worsening kidney function. We can adjust dose of other medications depending on your next results. Cholesterol results are showing bad cholesterol at goal with triglycerides are still high. As we discussed, please watch her diet strictly and limit rice and potatoes. Kidney function has worsened from your previous numbers and potassium is still high. Can be from the contrast during your procedure. Please stay well-hydrated and keep holding on candesartan until you see Dr. Roddy Vargas.

## 2021-09-28 NOTE — PROGRESS NOTES
TELEPHONE VISIT DOCUMENTATION 8/24/2021   Gerhardt Papas Popat 1950  70 y.o.male evaluated via telephone on 8/24/2021 due to COVID 19 restrictions. Patient unable to participate in Virtual Visit with synchronous audio/visual technology. · CAD with CABG 6/9/2018. · NSTEMI in November 2016 and resolved pulmonary HTN.    · Stent to circumflex an LAD in 2016. · Stress echo in 2018 with a drop in BP with exercise and a drop in EF. · Cath on 6/22/18 that showed even with a 5F catheter, he dampened with suspected ostial 3 vessel disease.   · Echo 3/27/2021 = EF 55 to 60% mild AR MR TR LAE MAC  · Nuclear 3/18/2021 EF 64% medium size defect apical and inferior lateral reversible ischemia medium defect mid and inferolateral nonreversible appear to be infarction intermediate risk stress test      1. Coronary artery disease involving native coronary artery of native heart without angina pectoris  CABG 2018  Continued pain, abnormal stress  Plan cath this week  2. Essential hypertension  Stable BP. If he goes off of the BB permanently, we can use NORVASC if needed. At goal , meds and possible side effects reviewed and patient denies  3. XOL-Lipids on high potency statin as appropriate for secondary prevention. 4. Aortic sclerosis  5. S.p CABG x3  6. Statin intol        1. Coronary artery disease involving native coronary artery of native heart without angina pectoris    2. Essential hypertension    3. Dyslipidemia    4. Non-rheumatic aortic sclerosis    5. S/P CABG x 3    6. Statin intolerance                   Echo 1-15-18 EF 63% 63 %. Mild LAE, MAC, mild AS mean 8, HUY 1.8 mild TR  CABG x 3 7-9-18= LIMA to LAD, SVG-OM, SVG-dRCA   Echo 6-22-18 EF 55-60%, mod MAC, aortic sclerosis without stenosis , mild TR  LISA 6-18-18 4 minutes +moderate hypokinesis of the mid anteroseptal, entire inferior, apical septal, and apical wall(s). With exercise a mild reduction in LV function.   PCI 11-11-16 JEREMY to mLAD 95% JEREMY to OM1 90%  Past Medical History:   Diagnosis Date    CAD (coronary artery disease) 11/10/2016    NSTEMI & 2 stents    Deafness 10/28/2012    DM [facility]diabetes mellitus) ([facility] [address]     Elevated cholesterol     Hypertension     NSTEMI (non-ST elevated myocardial infarction) ([facility] [address] 11/10/2016     reports that he has never smoked. He has never used smokeless tobacco. He reports current alcohol use of about 2.0 standard drinks of alcohol per week. He reports that he does not use drugs. family history includes Elevated Lipids in his brother, brother, and brother; Heart Attack in his father; Heart Disease in his father; Hypertension in his mother; No Known Problems in his daughter, sister, and son. Current Outpatient Medications   Medication Sig    furosemide (LASIX) 20 mg tablet Take 1 tablet by mouth once daily    hydroCHLOROthiazide (HYDRODIURIL) 25 mg tablet Take 1 Tablet by mouth daily.  candesartan (ATACAND) 32 mg tablet Take 1 Tab by mouth daily. (Patient not taking: Reported on 9/23/2021)    atorvastatin (LIPITOR) 20 mg tablet Take 1 Tab by mouth nightly.  aspirin delayed-release 81 mg tablet Take 1 Tab by mouth.  metoprolol tartrate (LOPRESSOR) 25 mg tablet Take 50 mg by mouth two (2) times a day.  empagliflozin (JARDIANCE) 10 mg tablet Take 1 Tablet by mouth daily.  Januvia 50 mg tablet Take 1 tablet by mouth once daily    metFORMIN (GLUCOPHAGE) 1,000 mg tablet TAKE 1 TABLET BY MOUTH TWICE DAILY WITH MEALS    glipiZIDE (GLUCOTROL) 5 mg tablet Take 1 tablet by mouth twice daily    isosorbide mononitrate ER (IMDUR) 30 mg tablet Take 1 Tablet by mouth daily. No current facility-administered medications for this visit.     No Known Allergies  Consent:   Jess Chandra and/or health care decision maker is aware that that  may receive a bill for this telephone service, depending on 's insurance coverage, and has provided verbal consent to proceed: Yes  Documentation:  I communicated with the patient and/or health care decision maker about as above  Details of this discussion including any medical advice provided: as above  I affirm this is a Patient Initiated Episode with an Established Patient who has not had a related appointment within my department in the past 7 days or scheduled within the next 24 hours. Patient was made aware and verbalized understanding that an appointment will be scheduled for them for a virtual visit and/or office visit within the above time frame. Patient understanding his/her responsibility to call and change time/date if he/she so chooses.   Note: not billable if this call serves to triage the patient into an appointment for the relevant concern  Total Time: minutes: 5-10 minutes

## 2021-10-07 DIAGNOSIS — I10 HYPERTENSION, ESSENTIAL: ICD-10-CM

## 2021-10-07 DIAGNOSIS — R06.09 DOE (DYSPNEA ON EXERTION): ICD-10-CM

## 2021-10-07 DIAGNOSIS — I25.10 CORONARY ARTERY DISEASE INVOLVING NATIVE CORONARY ARTERY OF NATIVE HEART WITHOUT ANGINA PECTORIS: ICD-10-CM

## 2021-10-07 DIAGNOSIS — E87.5 HYPERKALEMIA: ICD-10-CM

## 2021-10-07 DIAGNOSIS — E78.5 DYSLIPIDEMIA: ICD-10-CM

## 2021-10-07 DIAGNOSIS — Z95.1 S/P CABG X 3: ICD-10-CM

## 2021-10-07 RX ORDER — ATORVASTATIN CALCIUM 20 MG/1
TABLET, FILM COATED ORAL
Qty: 90 TABLET | Refills: 0 | Status: SHIPPED | OUTPATIENT
Start: 2021-10-07 | End: 2021-12-01

## 2021-10-07 RX ORDER — FUROSEMIDE 20 MG/1
TABLET ORAL
Qty: 90 TABLET | Refills: 0 | Status: SHIPPED | OUTPATIENT
Start: 2021-10-07 | End: 2021-11-29

## 2021-10-07 RX ORDER — ISOSORBIDE MONONITRATE 30 MG/1
TABLET, EXTENDED RELEASE ORAL
Qty: 90 TABLET | Refills: 0 | OUTPATIENT
Start: 2021-10-07

## 2021-10-07 RX ORDER — METOPROLOL TARTRATE 50 MG/1
TABLET ORAL
Qty: 60 TABLET | Refills: 0 | OUTPATIENT
Start: 2021-10-07

## 2021-10-07 NOTE — TELEPHONE ENCOUNTER
Requested Prescriptions     Signed Prescriptions Disp Refills    atorvastatin (LIPITOR) 20 mg tablet 90 Tablet 0     Sig: Take 1 tablet by mouth nightly     Authorizing Provider: Nicola Collins     Ordering User: ASHLEIGH Negron     Refused Prescriptions Disp Refills    metoprolol tartrate (LOPRESSOR) 50 mg tablet [Pharmacy Med Name: Metoprolol Tartrate 50 MG Oral Tablet] 60 Tablet 0     Sig: TAKE 1 TABLET BY MOUTH TWICE DAILY. *DOSE REDUCED BY HALF*     Refused By: Jesus Reynolds     Reason for Refusal: Medication Discontinued     Verbal order per Arnol.      Lopressor discontinued on 6-10-21.

## 2021-10-21 RX ORDER — POLYETHYLENE GLYCOL 3350 17 G/17G
17 POWDER, FOR SOLUTION ORAL 2 TIMES DAILY
Qty: 510 G | Refills: 1 | Status: SHIPPED | OUTPATIENT
Start: 2021-10-21 | End: 2021-11-22

## 2021-11-13 DIAGNOSIS — E11.65 TYPE 2 DIABETES MELLITUS WITH HYPERGLYCEMIA, WITHOUT LONG-TERM CURRENT USE OF INSULIN (HCC): ICD-10-CM

## 2021-11-13 RX ORDER — EMPAGLIFLOZIN 10 MG/1
TABLET, FILM COATED ORAL
Qty: 30 TABLET | Refills: 0 | Status: SHIPPED | OUTPATIENT
Start: 2021-11-13 | End: 2021-11-29

## 2021-11-18 ENCOUNTER — TELEPHONE (OUTPATIENT)
Dept: CARDIOLOGY CLINIC | Age: 71
End: 2021-11-18

## 2021-11-18 NOTE — TELEPHONE ENCOUNTER
Verified patient with two types of identifiers. Patient's wife reports his symptoms of fatigue and DONALDSON have not improved and they wish to move his December OV up sooner. Scheduled for Monday at 1540. They were appreciative.      Future Appointments   Date Time Provider Mina Delgado   11/22/2021  3:40 PM MD TEZ Ryan   1/24/2022  8:40 AM MD HI Wu BS AMB

## 2021-11-18 NOTE — TELEPHONE ENCOUNTER
Patients spouse Kim requesting to speak with Manus Najjar. Stated the patient is having \"some issues\".  (did not disclose)      Phone: [phone]  Transferred call to Manus Najjar

## 2021-11-22 ENCOUNTER — OFFICE VISIT (OUTPATIENT)
Dept: CARDIOLOGY CLINIC | Age: 71
End: 2021-11-22
Payer: MEDICARE

## 2021-11-22 VITALS
BODY MASS INDEX: 20.59 KG/M2 | OXYGEN SATURATION: 98 % | HEART RATE: 108 BPM | RESPIRATION RATE: 16 BRPM | SYSTOLIC BLOOD PRESSURE: 130 MMHG | DIASTOLIC BLOOD PRESSURE: 80 MMHG | HEIGHT: 69 IN | WEIGHT: 139 LBS

## 2021-11-22 DIAGNOSIS — I25.10 CORONARY ARTERY DISEASE INVOLVING NATIVE CORONARY ARTERY OF NATIVE HEART WITHOUT ANGINA PECTORIS: Primary | ICD-10-CM

## 2021-11-22 DIAGNOSIS — E78.5 DYSLIPIDEMIA: ICD-10-CM

## 2021-11-22 DIAGNOSIS — I10 ESSENTIAL HYPERTENSION: ICD-10-CM

## 2021-11-22 DIAGNOSIS — I35.8 NON-RHEUMATIC AORTIC SCLEROSIS: ICD-10-CM

## 2021-11-22 DIAGNOSIS — Z95.1 S/P CABG X 3: ICD-10-CM

## 2021-11-22 PROCEDURE — 99214 OFFICE O/P EST MOD 30 MIN: CPT | Performed by: SPECIALIST

## 2021-11-22 PROCEDURE — G8427 DOCREV CUR MEDS BY ELIG CLIN: HCPCS | Performed by: SPECIALIST

## 2021-11-22 PROCEDURE — G8510 SCR DEP NEG, NO PLAN REQD: HCPCS | Performed by: SPECIALIST

## 2021-11-22 PROCEDURE — G8536 NO DOC ELDER MAL SCRN: HCPCS | Performed by: SPECIALIST

## 2021-11-22 PROCEDURE — 3017F COLORECTAL CA SCREEN DOC REV: CPT | Performed by: SPECIALIST

## 2021-11-22 PROCEDURE — G8420 CALC BMI NORM PARAMETERS: HCPCS | Performed by: SPECIALIST

## 2021-11-22 PROCEDURE — G8754 DIAS BP LESS 90: HCPCS | Performed by: SPECIALIST

## 2021-11-22 PROCEDURE — G8752 SYS BP LESS 140: HCPCS | Performed by: SPECIALIST

## 2021-11-22 PROCEDURE — 1101F PT FALLS ASSESS-DOCD LE1/YR: CPT | Performed by: SPECIALIST

## 2021-11-22 RX ORDER — CARVEDILOL 6.25 MG/1
6.25 TABLET ORAL 2 TIMES DAILY WITH MEALS
Qty: 180 TABLET | Refills: 1 | Status: SHIPPED | OUTPATIENT
Start: 2021-11-22 | End: 2022-03-28

## 2021-11-22 NOTE — Clinical Note
11/22/2021    Patient: Jose Garza   YOB: 1950   Date of Visit: 11/22/2021     Jaxson Griggs MD  [address] [address]  Via In Basket    Dear Jaxson Griggs MD,      Thank you for referring Mr. Jose Garza to [address]  for evaluation. My notes for this consultation are attached. If you have questions, please do not hesitate to call me. I look forward to following your patient along with you.       Sincerely,    Tara Murcia MD

## 2021-11-22 NOTE — PROGRESS NOTES
Neal Kendall     1950       Monik Estevez MD, [facility]  Date of Visit-11/22/2021   PCP is Rachel Pallas, MD   [facility]  [facility]  HPI:  Beryle Shack is a 80 y.o. male   3 month f/u from [facility]. · CAD with CABG 6/9/2018. · NSTEMI in November 2016 and resolved pulmonary HTN.    · Stent to circumflex an LAD in 2016.   · Stress echo in 2018 with a drop in BP with exercise and a drop in EF.   · Cath on 6/22/18 that showed even with a 5F catheter, he dampened with suspected ostial 3 vessel disease.   · Echo 3/27/2021 = EF 55 to 60% mild AR MR TR LAE MAC  · Nuclear 3/18/2021 EF 64% medium size defect apical and inferior lateral reversible ischemia medium defect mid and inferolateral nonreversible appear to be infarction intermediate risk stress test       Today. .. Pt's wife called indicating DONALDSON and fatigue. His HGB A1C had been up at 7.6 recently. Pt was seen on 8/19/21 after reducing Metoprolol. Lisinopril reduced when seen renal. Was told to stop BB for a week. He has a reversible defect at apex in inferolateral wall on last stress test. He had cath on 9/8/21, LIMA to LAD was patent. SVG to RCA was patent. SVG to OM was difficult to visualize. The reversible defect was in the OM distribution. Pt states that he has some fatigue and tiredness in the recent weeks, especially when doing small tasks such as lifting groceries and going to work. Denies chest pain, edema, syncope or shortness of breath at rest, has no tachycardia, palpitations or sense of arrhythmia. Assessment/Plan:     Patient Instructions   Please start carvedilol 6.25mg twice a day. You have been scheduled to see Dr. Fernando Choi to discuss a cath. We will see you back in 6 months. 1. Coronary artery disease involving native coronary artery of native heart without angina pectoris  CABG 2018, pt had a cath on 9/8/21 that showed open LIMA.  Dr. Fernando Choi after Dr. Torrey June suggest stenting via left main. Pt remains fatigued, SOB unable to carry groceries in house, but no CP. We have tried imdur in past? He is tachycardic today. I will add coreg 6.26 BID and have him see Dr. Khadra Delgado to consider stenting. 2. Essential hypertension  Stable BP. At goal , meds and possible side effects reviewed and patient denies  Key CAD CHF Meds             atorvastatin (LIPITOR) 20 mg tablet (Taking) Take 1 tablet by mouth nightly    furosemide (LASIX) 20 mg tablet (Taking) Take 1 tablet by mouth once daily    isosorbide mononitrate ER (IMDUR) 30 mg tablet (Taking) Take 1 Tablet by mouth daily. hydroCHLOROthiazide (HYDRODIURIL) 25 mg tablet (Taking) Take 1 Tablet by mouth daily. aspirin delayed-release 81 mg tablet (Taking) Take 1 Tab by mouth.    metoprolol tartrate (LOPRESSOR) 25 mg tablet Take 50 mg by mouth two (2) times a day. candesartan (ATACAND) 32 mg tablet Take 1 Tab by mouth daily. BP Readings from Last 6 Encounters:   11/22/21 130/80   09/23/21 (!) 177/65   09/08/21 (!) 163/68   08/19/21 (!) 120/90   06/10/21 (!) 140/70   05/20/21 132/77           3. Dyslipidemia  Lipids on high potency statin as appropriate for secondary prevention. At goal , denies excess muscle aches or new liver issues  Key Antihyperlipidemia Meds             atorvastatin (LIPITOR) 20 mg tablet (Taking) Take 1 tablet by mouth nightly         Lab Results   Component Value Date/Time    LDL, calculated 83 09/23/2021 10:15 AM          4. Non-rheumatic aortic sclerosis  Murmur, calcific aortic valve without stenosis. Echo as above. 5. S/P CABG x 3    F/u in 6 months     Impression:   1. Coronary artery disease involving native coronary artery of native heart without angina pectoris    2. Essential hypertension    3. Dyslipidemia    4. Non-rheumatic aortic sclerosis    5. S/P CABG x 3       Cardiac History:   Echo 1-15-18 EF 63% 63 %.  Mild LAE, MAC, mild AS mean 8, HUY 1.8 mild TR  CABG x 3 7-9-18= LIMA to LAD, SVG-OM, SVG-dRCA   Echo 6-22-18 EF 55-60%, mod MAC, aortic sclerosis without stenosis , mild TR  LISA 6-18-18 4 minutes +moderate hypokinesis of the mid anteroseptal, entire inferior, apical septal, and apical wall(s). With exercise a mild reduction in LV function. PCI 11-11-16 JEREMY to mLAD 95% JEREMY to OM1 90%     Future Appointments   Date Time Provider Mina Delgado   1/24/2022  8:40 AM Jessica Joe MD PAFP BS AMB        ROS-except as noted above. . A complete cardiac and respiratory are reviewed and negative except as above ; Resp-denies wheezing  or productive cough,. Const- No unusual weight loss or fever; Neuro-no recent seizure or CVA ; GI- No BRBPR, abdom pain, bloating ; - no  hematuria   see supplement sheet, initialed and to be scanned by staff  Past Medical History:   Diagnosis Date    CAD (coronary artery disease) 11/10/2016    NSTEMI & 2 stents    Deafness 10/28/2012    DM (diabetes mellitus) ([facility] [address]     Elevated cholesterol     Hypertension     NSTEMI (non-ST elevated myocardial infarction) ([facility] [address] 11/10/2016      Social Hx= reports that he has never smoked. He has never used smokeless tobacco. He reports current alcohol use of about 2.0 standard drinks of alcohol per week. He reports that he does not use drugs. Exam and Labs:  /80   Pulse (!) 108   Resp 16   Ht 5' 9\" (1.753 m)   Wt 139 lb (63 kg)   SpO2 98%   BMI 20.53 kg/m² Constitutional:  NAD, comfortable  Head: NC,AT. Eyes: No scleral icterus. Neck:  Neck supple. No JVD present. Throat: moist mucous membranes. Chest: Effort normal & normal respiratory excursion . Neurological: alert, conversant and oriented . Skin: Skin is not cold. No obvious systemic rash noted. Not diaphoretic. No erythema. Psychiatric:  Grossly normal mood and affect. Behavior appears normal. Extremities:  no clubbing or cyanosis. Abdomen: non distended    Lungs:breath sounds normal. No stridor. distress, wheezes or  Rales.   Heart:2/6 murmur normal rate, regular rhythm, normal S1, S2, no rubs, clicks or gallops , PMI non displaced. Edema: Edema is none. Lab Results   Component Value Date/Time    Cholesterol, total 169 09/23/2021 10:15 AM    HDL Cholesterol 39 09/23/2021 10:15 AM    LDL, calculated 83 09/23/2021 10:15 AM    Triglyceride 235 (H) 09/23/2021 10:15 AM    CHOL/HDL Ratio 4.3 09/23/2021 10:15 AM     Lab Results   Component Value Date/Time    Sodium 136 09/23/2021 10:15 AM    Potassium 5.5 (H) 09/23/2021 10:15 AM    Chloride 109 (H) 09/23/2021 10:15 AM    CO2 24 09/23/2021 10:15 AM    Anion gap 3 (L) 09/23/2021 10:15 AM    Glucose 77 09/23/2021 10:15 AM    BUN 30 (H) 09/23/2021 10:15 AM    Creatinine 1.91 (H) 09/23/2021 10:15 AM    BUN/Creatinine ratio 16 09/23/2021 10:15 AM    GFR est AA 43 (L) 09/23/2021 10:15 AM    GFR est non-AA 35 (L) 09/23/2021 10:15 AM    Calcium 9.8 09/23/2021 10:15 AM      Wt Readings from Last 3 Encounters:   11/22/21 139 lb (63 kg)   09/23/21 138 lb 3.2 oz (62.7 kg)   09/08/21 136 lb (61.7 kg)      BP Readings from Last 3 Encounters:   11/22/21 130/80   09/23/21 (!) 177/65   09/08/21 (!) 163/68      Current Outpatient Medications   Medication Sig    Jardiance 10 mg tablet Take 1 tablet by mouth once daily    atorvastatin (LIPITOR) 20 mg tablet Take 1 tablet by mouth nightly    furosemide (LASIX) 20 mg tablet Take 1 tablet by mouth once daily    Januvia 50 mg tablet Take 1 tablet by mouth once daily    metFORMIN (GLUCOPHAGE) 1,000 mg tablet TAKE 1 TABLET BY MOUTH TWICE DAILY WITH MEALS    glipiZIDE (GLUCOTROL) 5 mg tablet Take 1 tablet by mouth twice daily    isosorbide mononitrate ER (IMDUR) 30 mg tablet Take 1 Tablet by mouth daily.  hydroCHLOROthiazide (HYDRODIURIL) 25 mg tablet Take 1 Tablet by mouth daily.  aspirin delayed-release 81 mg tablet Take 1 Tab by mouth.  polyethylene glycol (MIRALAX) 17 gram/dose powder Take 17 g by mouth two (2) times a day.  (Patient not taking: Reported on 11/22/2021)    metoprolol tartrate (LOPRESSOR) 25 mg tablet Take 50 mg by mouth two (2) times a day. (Patient not taking: Reported on 11/22/2021)    candesartan (ATACAND) 32 mg tablet Take 1 Tab by mouth daily. (Patient not taking: Reported on 9/23/2021)     No current facility-administered medications for this visit. Impression see above.       Written by Chino Burton, as dictated by Stephanie Kimbrough MD.

## 2021-11-22 NOTE — PATIENT INSTRUCTIONS
Please start carvedilol 6.25mg twice a day. You have been scheduled to see Dr. Patrice Nicolas to discuss a cath. We will see you back in 6 months.

## 2021-11-29 DIAGNOSIS — R06.09 DOE (DYSPNEA ON EXERTION): ICD-10-CM

## 2021-11-29 DIAGNOSIS — Z95.1 S/P CABG X 3: ICD-10-CM

## 2021-11-29 DIAGNOSIS — E78.5 DYSLIPIDEMIA: ICD-10-CM

## 2021-11-29 DIAGNOSIS — E11.65 TYPE 2 DIABETES MELLITUS WITH HYPERGLYCEMIA, WITHOUT LONG-TERM CURRENT USE OF INSULIN (HCC): ICD-10-CM

## 2021-11-29 DIAGNOSIS — I10 HYPERTENSION, ESSENTIAL: ICD-10-CM

## 2021-11-29 DIAGNOSIS — I10 ESSENTIAL HYPERTENSION: ICD-10-CM

## 2021-11-29 DIAGNOSIS — E87.5 HYPERKALEMIA: ICD-10-CM

## 2021-11-29 RX ORDER — HYDROCHLOROTHIAZIDE 25 MG/1
TABLET ORAL
Qty: 90 TABLET | Refills: 0 | Status: SHIPPED | OUTPATIENT
Start: 2021-11-29 | End: 2022-01-18

## 2021-11-29 RX ORDER — GLIPIZIDE 5 MG/1
TABLET ORAL
Qty: 180 TABLET | Refills: 0 | Status: SHIPPED | OUTPATIENT
Start: 2021-11-29 | End: 2022-01-18

## 2021-11-29 RX ORDER — FUROSEMIDE 20 MG/1
TABLET ORAL
Qty: 90 TABLET | Refills: 0 | Status: SHIPPED | OUTPATIENT
Start: 2021-11-29 | End: 2022-03-28

## 2021-11-29 RX ORDER — ISOSORBIDE MONONITRATE 30 MG/1
TABLET, EXTENDED RELEASE ORAL
Qty: 90 TABLET | Refills: 0 | Status: SHIPPED | OUTPATIENT
Start: 2021-11-29 | End: 2022-03-28

## 2021-11-29 RX ORDER — EMPAGLIFLOZIN 10 MG/1
TABLET, FILM COATED ORAL
Qty: 30 TABLET | Refills: 0 | Status: SHIPPED | OUTPATIENT
Start: 2021-11-29 | End: 2022-01-04

## 2021-11-29 RX ORDER — SITAGLIPTIN 50 MG/1
TABLET, FILM COATED ORAL
Qty: 90 TABLET | Refills: 0 | Status: SHIPPED | OUTPATIENT
Start: 2021-11-29 | End: 2022-04-06

## 2021-12-01 RX ORDER — ATORVASTATIN CALCIUM 20 MG/1
20 TABLET, FILM COATED ORAL
Qty: 90 TABLET | Refills: 3 | Status: SHIPPED | OUTPATIENT
Start: 2021-12-01 | End: 2022-02-28

## 2021-12-01 RX ORDER — CANDESARTAN 32 MG/1
32 TABLET ORAL DAILY
Qty: 90 TABLET | Refills: 3 | OUTPATIENT
Start: 2021-12-01

## 2021-12-01 NOTE — TELEPHONE ENCOUNTER
Per VO by MD.     Future Appointments   Date Time Provider Mina Delgado   12/29/2021  8:20 AM MD TEZ Jalloh BS AMB   1/24/2022  8:40 AM MD HI Koroma BS AMB   5/23/2022  3:00 PM MD TEZ Mills BS AMB

## 2022-01-18 DIAGNOSIS — E11.65 TYPE 2 DIABETES MELLITUS WITH HYPERGLYCEMIA, WITHOUT LONG-TERM CURRENT USE OF INSULIN (HCC): ICD-10-CM

## 2022-01-18 DIAGNOSIS — I10 HYPERTENSION, ESSENTIAL: ICD-10-CM

## 2022-01-18 RX ORDER — HYDROCHLOROTHIAZIDE 25 MG/1
TABLET ORAL
Qty: 90 TABLET | Refills: 0 | Status: SHIPPED | OUTPATIENT
Start: 2022-01-18 | End: 2022-06-24

## 2022-01-18 RX ORDER — GLIPIZIDE 5 MG/1
TABLET ORAL
Qty: 180 TABLET | Refills: 0 | Status: SHIPPED | OUTPATIENT
Start: 2022-01-18 | End: 2022-06-26

## 2022-01-19 ENCOUNTER — OFFICE VISIT (OUTPATIENT)
Dept: CARDIOLOGY CLINIC | Age: 72
End: 2022-01-19
Payer: MEDICARE

## 2022-01-19 VITALS
RESPIRATION RATE: 16 BRPM | DIASTOLIC BLOOD PRESSURE: 80 MMHG | SYSTOLIC BLOOD PRESSURE: 140 MMHG | HEIGHT: 69 IN | WEIGHT: 135 LBS | BODY MASS INDEX: 19.99 KG/M2 | HEART RATE: 83 BPM | OXYGEN SATURATION: 100 %

## 2022-01-19 DIAGNOSIS — I35.0 NONRHEUMATIC AORTIC VALVE STENOSIS: ICD-10-CM

## 2022-01-19 DIAGNOSIS — Z95.1 S/P CABG X 3: ICD-10-CM

## 2022-01-19 DIAGNOSIS — Z78.9 STATIN INTOLERANCE: ICD-10-CM

## 2022-01-19 DIAGNOSIS — I25.10 CORONARY ARTERY DISEASE INVOLVING NATIVE CORONARY ARTERY OF NATIVE HEART WITHOUT ANGINA PECTORIS: Primary | ICD-10-CM

## 2022-01-19 DIAGNOSIS — I10 HYPERTENSION, ESSENTIAL: ICD-10-CM

## 2022-01-19 PROCEDURE — G8753 SYS BP > OR = 140: HCPCS | Performed by: INTERNAL MEDICINE

## 2022-01-19 PROCEDURE — 99214 OFFICE O/P EST MOD 30 MIN: CPT | Performed by: INTERNAL MEDICINE

## 2022-01-19 PROCEDURE — G8420 CALC BMI NORM PARAMETERS: HCPCS | Performed by: INTERNAL MEDICINE

## 2022-01-19 PROCEDURE — 93000 ELECTROCARDIOGRAM COMPLETE: CPT | Performed by: INTERNAL MEDICINE

## 2022-01-19 PROCEDURE — G8754 DIAS BP LESS 90: HCPCS | Performed by: INTERNAL MEDICINE

## 2022-01-19 PROCEDURE — G8510 SCR DEP NEG, NO PLAN REQD: HCPCS | Performed by: INTERNAL MEDICINE

## 2022-01-19 PROCEDURE — 1101F PT FALLS ASSESS-DOCD LE1/YR: CPT | Performed by: INTERNAL MEDICINE

## 2022-01-19 PROCEDURE — 3017F COLORECTAL CA SCREEN DOC REV: CPT | Performed by: INTERNAL MEDICINE

## 2022-01-19 PROCEDURE — G8536 NO DOC ELDER MAL SCRN: HCPCS | Performed by: INTERNAL MEDICINE

## 2022-01-19 PROCEDURE — G8427 DOCREV CUR MEDS BY ELIG CLIN: HCPCS | Performed by: INTERNAL MEDICINE

## 2022-01-19 NOTE — PROGRESS NOTES
Dr. Toma Godfrey. [phone]            Cardiology Consult/Progress Note      Requesting/referring provider: Catherine Clark MD     Reason for Consult:   Angina with hx of CABG. Assessment/Plan:    1. Coronary artery disease involving native coronary artery of native heart without angina pectoris but exertional intolerance and shortness of breath    2. Essential hypertension    3. Hyperlipidemia- on statin,. Last LDL 78.8 in Sept 2021.     4. DM - followed by PCP.  is seen at the request of Dr Charles Clarke.  He has history of coronary artery disease, with preserved LV function, hypertension, diabetes mellitus and has been reporting increasing tiredness and shortness of breath over the past few months. I reviewed his last cardiac catheterization. His symptoms occurred at rather limited activity and I am uncertain if his CAD alone could be a cause of his symptoms and given that it is limited to only circumflex territory in both RCA and LAD grafts are patent however despite adequate. His aortic stenosis does not appear to be severe enough at this point of time to cause his symptoms. He is already on significant antianginal therapy    I have offered the patient an option to consider PCI of his distal left main and circumflex but with the caveat that he may not notice normalization of symptoms since symptoms could be potentially from other unexplained causes beyond CAD. At the same time since all other options have not worked so far it would be reasonable to consider PCI just in case it helps with any kind of symptom improvement. Blood pressure treatment will also be continued. His heart rate is much improved compared to the last visit. He is on adequate statin therapy with last LDL of 78 mg/dL. Patient will make a final decision and will let us know whether he like to proceed or not.     I discussed the risks/benefits/alternatives of the procedure with the patient. Risks include (but are not limited to) bleeding, infection,stroke,heart attack, need for emergency surgery/pericardiocentesis, need for dialysis or death. The patient understands and agrees to proceed. Investigations ordered: She will require cardiac catheterization, he will require a BMP and CBC in addition. []    High complexity decision making was performed  []    Patient is at high-risk of decompensation with multiple organ involvement  []    Complex/difficult social determinants of health outcomes  Total of ** minutes were spent on reviewing the records, analyzing investigations and documentation in the chart, on the day of visit including time for examination and time spent with the patient  Investigations personally reviewed and interpreted  Cardiac catheterization September 2021: Reviewed above. EKG 01/19/22      Sinus  Rhythm   -ST depression   +   Nonspecific T-abnormality  -Nondiagnostic -possible digitalis effect, -consider subendocardial injury/ischemia. Ventricular rate 89    03/15/21    NUCLEAR CARDIAC STRESS TEST 03/18/2021 3/19/2021    Interpretation Summary  · SPECT: Left ventricular function post-stress was normal. Calculated ejection fraction is 64%. There is no evidence of transient ischemic dilation (TID). The TID ratio is 1.07.  · Baseline ECG: Normal sinus rhythm, non-specific ST-T wave abnormalities. · SPECT: Myocardial perfusion imaging defect 1: There is a defect that is medium in size present in the apical and inferolateral location(s) that is reversible. The defect appears to be ischemia. Perfusion defect was visually and quantitatively present. Myocardial perfusion imaging defect 2: There is a defect that is medium in size affecting the mid and inferolateral location(s) that is non-reversible. The defect appears to be infarction.   · SPECT: Left ventricular perfusion is abnormal. an intermediate risk stress test.    Signed by: Raheem Oropeza MD on 3/18/2021 11:12 AM   03/15/21    ECHO ADULT COMPLETE 03/27/2021 3/27/2021    Interpretation Summary  · LV: Estimated LVEF is 55 - 60%. Biplane method used to measure ejection fraction. Normal cavity size and systolic function (ejection fraction normal). Mild concentric hypertrophy. Wall motion: normal. Mild (grade 1) left ventricular diastolic dysfunction. · AV: Aortic valve leaflet calcification present. Mild aortic valve regurgitation is present. · MV: Mitral valve thickening. Mitral valve leaflet calcification. Moderate mitral annular calcification. Mild mitral valve regurgitation is present. · TV: Mild tricuspid valve regurgitation is present. · PV: Mild pulmonic valve regurgitation is present. · LA: Mildly dilated left atrium. · PA: Pulmonary arterial systolic pressure is 33 mmHg. Signed by: Elizabeth Chau MD on 3/27/2021  7:40 PM      Investigations reviewed    [facility] 9/8/21-    R CFA access - 6 F sheath     L Main: Med to Large; Distal 60-70%      LAD: Ostial 70%; Mid 90%; Competitive flow from LIMA to LAD; Small D1/D2     LCflex: Ostial/prox 80%; Med; OM1 - small - prox diffuse dz/OM2 - small to med/OM3 - small - aneurysmal segment distal vessel - ? Bypassed vessel . NICOLE 3 flow in distal LCflex and OM     RCA:  Med; Ostial/Prox 90%    LIMA to LAD - very tortuous; patent; Native vessel patent     SVG to RCA  ( RCB catheter)- patent; Nativel vessel small patent; backfilling of native RCA noted     SVG to OM - multiple catheters used - [id number] - unable to visualize; Aortogram performed - not visualised - prob TO. Also no competitive flow noted in OM on injecting native LCA     LVEDP: 8 mm Hg     LVEF: Not assessed     No significant gradient across aortic valve.    Osmin Noble, a 80y.o. year-old who is seen for evaluation of angina with hx of CAD and CABG   He  has a past medical history of CAD (coronary artery disease) (11/10/2016), Deafness (10/28/2012), DM (diabetes mellitus) ([address] Elevated cholesterol, Hypertension, and NSTEMI (non-ST elevated myocardial infarction) ([facility] (11/10/2016). Review of system:Patient reports no dyspnea/PND/Orthpnea/CP***. He***She reports no cough/fever/focal neurological deficits/abdominal pain. All other systems negative except as above. Family History   Problem Relation Age of Onset    Heart Disease Father     Heart Attack Father     Hypertension Mother    [facility] Elevated Lipids Brother     Elevated Lipids Brother     No Known Problems Sister     Elevated Lipids Brother     No Known Problems Son     No Known Problems Daughter     Anesth Problems Neg Hx       Social History     Socioeconomic History    Marital status:    Tobacco Use    Smoking status: Never Smoker    Smokeless tobacco: Never Used   Vaping Use    Vaping Use: Never used   Substance and Sexual Activity    Alcohol use: Yes     Alcohol/week: 2.0 standard drinks     Types: 1 Cans of beer, 1 Shots of liquor per week     Comment: 1 DRINK DAILY    Drug use: No    Sexual activity: Yes      PE  Vitals:    01/19/22 1534 01/19/22 1540   BP: (!) 160/70 (!) 140/80   Pulse: 83    Resp: 16    SpO2: 100%    Weight: 135 lb (61.2 kg)    Height: 5' 9\" (1.753 m)     Body mass index is 19.94 kg/m². General:    Alert, cooperative, no distress. Psychiatric:    Normal Mood and affect    Eye/ENT:      Pupils equal, No asymmetry, Conjunctival pink. Able to hear voice at normal amplitude   Lungs:      Visibly symmetric chest expansion, No palpable tenderness. Clear to auscultation bilaterally. Heart[de-identified]    Regular rate and rhythm, S1, S2 normal, no murmur, click, rub or gallop. No JVD, Normal palpable peripheral pulses. No cyanosis   Abdomen:     Soft, non-tender. Bowel sounds normal. No masses,  No      organomegaly. Extremities:   Extremities normal, atraumatic, no edema. Neurologic:   CN II-XII grossly intact.  No gross focal deficits           Recent Labs:  Lab Results   Component Value Date/Time    Cholesterol, total 169 09/23/2021 10:15 AM    HDL Cholesterol 39 09/23/2021 10:15 AM    LDL, calculated 83 09/23/2021 10:15 AM    Triglyceride 235 (H) 09/23/2021 10:15 AM    CHOL/HDL Ratio 4.3 09/23/2021 10:15 AM     Lab Results   Component Value Date/Time    Creatinine 1.91 (H) 09/23/2021 10:15 AM     Lab Results   Component Value Date/Time    BUN 30 (H) 09/23/2021 10:15 AM     Lab Results   Component Value Date/Time    Potassium 5.5 (H) 09/23/2021 10:15 AM     Lab Results   Component Value Date/Time    Hemoglobin A1c 7.6 (H) 09/23/2021 10:15 AM     Lab Results   Component Value Date/Time    HGB 12.6 09/23/2021 10:15 AM     Lab Results   Component Value Date/Time    PLATELET 796 55/70/1598 10:15 AM       Reviewed:  Past Medical History:   Diagnosis Date    CAD (coronary artery disease) 11/10/2016    NSTEMI & 2 stents    Deafness 10/28/2012    DM [facility]di[facility]) [facility]     Elevated cholesterol     Hypertension     [facility]non-ST elevated [facility]) [facility] 11/10/2016     Social History     Tobacco Use   Smoking Status Never Smoker   Smokeless Tobacco Never Used     Social History     Substance and Sexual Activity   Alcohol Use Yes    Alcohol/week: 2.0 standard drinks    Types: 1 Cans of beer, 1 Shots of liquor per week    Comment: 1 DRINK DAILY     No Known Allergies  Family History   Problem Relation Age of Onset    Heart Disease Father     Heart Attack Father     Hypertension Mother    [place] Elevated Lipids Brother     Elevated Lipids Brother     No Known Problems Sister     Elevated Lipids Brother     No Known Problems Son     No Known Problems Daughter     Anesth Problems Neg Hx         Current Outpatient Medications   Medication Sig    glipiZIDE (GLUCOTROL) 5 mg tablet Take 1 tablet by mouth twice daily    hydroCHLOROthiazide (HYDRODIURIL) 25 mg tablet Take 1 tablet by mouth once daily    Jardiance 10 mg tablet Take 1 tablet by mouth once daily    atorvastatin (LIPITOR) 20 mg tablet Take 1 Tablet by mouth nightly.  furosemide (LASIX) 20 mg tablet Take 1 tablet by mouth once daily    Januvia 50 mg tablet Take 1 tablet by mouth once daily    isosorbide mononitrate ER (IMDUR) 30 mg tablet Take 1 tablet by mouth once daily    carvediloL (COREG) 6.25 mg tablet Take 1 Tablet by mouth two (2) times daily (with meals).  metFORMIN (GLUCOPHAGE) 1,000 mg tablet TAKE 1 TABLET BY MOUTH TWICE DAILY WITH MEALS    aspirin delayed-release 81 mg tablet Take 1 Tab by mouth. No current facility-administered medications for this visit. ATTENTION:   This medical record was transcribed using an electronic medical records/speech recognition system. Although proofread, it may and can contain electronic, spelling and other errors. Corrections may be executed at a later time. Please feel free to contact us for any clarifications as needed.     [facility] heart and Vascular [place]  [employer], [employer],  [facility], [address] [facility] [phone]

## 2022-01-19 NOTE — LETTER
1/19/2022 4:30 PM    Patient:  Kwabena Trujillo   YOB: 1950  Date of Visit: 1/19/2022      Dear   No Recipients: Thank you for referring Mr. Kwabena Trujillo to me for evaluation/treatment. Below are the relevant portions of my assessment and plan of care. If you have questions, please do not hesitate to call me. I look forward to following Mr. Kendall along with you.         Sincerely,      Abelino He MD

## 2022-01-19 NOTE — PROGRESS NOTES
Dr. Angie Simon. [phone]            Cardiology Consult/Progress Note      Requesting/referring provider: Jody Evans MD     Reason for Consult:   Angina with hx of CABG. Assessment/Plan:    1. Coronary artery disease involving native coronary artery of native heart without angina pectoris but exertional intolerance and shortness of breath    2. Essential hypertension    3. Hyperlipidemia- on statin,. Last LDL 78.8 in Sept 2021.     4. DM - followed by PCP.  is seen at the request of Dr Maria Luisa Vivar.  He has history of coronary artery disease, with preserved LV function, hypertension, diabetes mellitus and has been reporting increasing tiredness and shortness of breath over the past few months. I reviewed his last cardiac catheterization. His symptoms occurred at rather limited activity and I am uncertain if his CAD alone could be a cause of his symptoms and given that it is limited to only circumflex territory in both RCA and LAD grafts are patent however despite adequate. His aortic stenosis does not appear to be severe enough at this point of time to cause his symptoms. He is already on significant antianginal therapy    I have offered the patient an option to consider PCI of his distal left main and circumflex but with the caveat that he may not notice normalization of symptoms since symptoms could be potentially from other unexplained causes beyond CAD. At the same time since all other options have not worked so far it would be reasonable to consider PCI just in case it helps with any kind of symptom improvement. Blood pressure treatment will also be continued. His heart rate is much improved compared to the last visit. He is on adequate statin therapy with last LDL of 78 mg/dL. Patient will make a final decision and will let us know whether he like to proceed or not.     I discussed the risks/benefits/alternatives of the procedure with the patient. Risks include (but are not limited to) bleeding, infection,stroke,heart attack, need for emergency surgery/pericardiocentesis, need for dialysis or death. The patient understands and agrees to proceed. Investigations ordered: She will require cardiac catheterization, he will require a BMP and CBC in addition. []    High complexity decision making was performed  []    Patient is at high-risk of decompensation with multiple organ involvement  []    Complex/difficult social determinants of health outcomes  Total of ** minutes were spent on reviewing the records, analyzing investigations and documentation in the chart, on the day of visit including time for examination and time spent with the patient  Investigations personally reviewed and interpreted  Cardiac catheterization September 2021: Reviewed above. EKG 01/19/22      Sinus  Rhythm   -ST depression   +   Nonspecific T-abnormality  -Nondiagnostic -possible digitalis effect, -consider subendocardial injury/ischemia. Ventricular rate 89    03/15/21    NUCLEAR CARDIAC STRESS TEST 03/18/2021 3/19/2021    Interpretation Summary  · SPECT: Left ventricular function post-stress was normal. Calculated ejection fraction is 64%. There is no evidence of transient ischemic dilation (TID). The TID ratio is 1.07.  · Baseline ECG: Normal sinus rhythm, non-specific ST-T wave abnormalities. · SPECT: Myocardial perfusion imaging defect 1: There is a defect that is medium in size present in the apical and inferolateral location(s) that is reversible. The defect appears to be ischemia. Perfusion defect was visually and quantitatively present. Myocardial perfusion imaging defect 2: There is a defect that is medium in size affecting the mid and inferolateral location(s) that is non-reversible. The defect appears to be infarction.   · SPECT: Left ventricular perfusion is abnormal. an intermediate risk stress test.    Signed by: García Roman MD on 3/18/2021 11:12 AM   03/15/21    ECHO ADULT COMPLETE 03/27/2021 3/27/2021    Interpretation Summary  · LV: Estimated LVEF is 55 - 60%. Biplane method used to measure ejection fraction. Normal cavity size and systolic function (ejection fraction normal). Mild concentric hypertrophy. Wall motion: normal. Mild (grade 1) left ventricular diastolic dysfunction. · AV: Aortic valve leaflet calcification present. Mild aortic valve regurgitation is present. · MV: Mitral valve thickening. Mitral valve leaflet calcification. Moderate mitral annular calcification. Mild mitral valve regurgitation is present. · TV: Mild tricuspid valve regurgitation is present. · PV: Mild pulmonic valve regurgitation is present. · LA: Mildly dilated left atrium. · PA: Pulmonary arterial systolic pressure is 33 mmHg. Signed by: Sukhdev Gonzalez MD on 3/27/2021  7:40 PM      Investigations reviewed    [facility] 9/8/21-    R CFA access - 6 F sheath     L Main: Med to Large; Distal 60-70%      LAD: Ostial 70%; Mid 90%; Competitive flow from LIMA to LAD; Small D1/D2     LCflex: Ostial/prox 80%; Med; OM1 - small - prox diffuse dz/OM2 - small to med/OM3 - small - aneurysmal segment distal vessel - ? Bypassed vessel . NICOLE 3 flow in distal LCflex and OM     RCA:  Med; Ostial/Prox 90%    LIMA to LAD - very tortuous; patent; Native vessel patent     SVG to RCA  ( RCB catheter)- patent; Nativel vessel small patent; backfilling of native RCA noted     SVG to OM - multiple catheters used - [id number] - unable to visualize; Aortogram performed - not visualised - prob TO. Also no competitive flow noted in OM on injecting native LCA     LVEDP: 8 mm Hg     LVEF: Not assessed     No significant gradient across aortic valve.    Maci Mcgowan, a 80y.o. year-old who is seen for evaluation of angina with hx of CAD and CABG   He  has a past medical history of CAD (coronary artery disease) (11/10/2016), Deafness (10/28/2012), DM (diabetes mellitus) ([address] Elevated cholesterol, Hypertension, and NSTEMI (non-ST elevated myocardial infarction) ([facility] (11/10/2016). Review of system:Patient reports no PND/Orthpnea/CP. He reports no cough/fever/focal neurological deficits/abdominal pain. All other systems negative except as above. Family History   Problem Relation Age of Onset    Heart Disease Father     Heart Attack Father     Hypertension Mother    [place] Elevated Lipids Brother     Elevated Lipids Brother     No Known Problems Sister     Elevated Lipids Brother     No Known Problems Son     No Known Problems Daughter     Anesth Problems Neg Hx       Social History     Socioeconomic History    Marital status:    Tobacco Use    Smoking status: Never Smoker    Smokeless tobacco: Never Used   Vaping Use    Vaping Use: Never used   Substance and Sexual Activity    Alcohol use: Yes     Alcohol/week: 2.0 standard drinks     Types: 1 Cans of beer, 1 Shots of liquor per week     Comment: 1 DRINK DAILY    Drug use: No    Sexual activity: Yes      PE  Vitals:    01/19/22 1534 01/19/22 1540   BP: (!) 160/70 (!) 140/80   Pulse: 83    Resp: 16    SpO2: 100%    Weight: 135 lb (61.2 kg)    Height: 5' 9\" (1.753 m)     Body mass index is 19.94 kg/m². General:    Alert, cooperative, no distress. Psychiatric:    Normal Mood and affect    Eye/ENT:      Pupils equal, No asymmetry, Conjunctival pink. Able to hear voice at normal amplitude   Lungs:      Visibly symmetric chest expansion, No palpable tenderness. Clear to auscultation bilaterally. Heart[de-identified]    Regular rate and rhythm, S1, S2 normal, no murmur, click, rub or gallop. No JVD, Normal palpable peripheral pulses. No cyanosis   Abdomen:     Soft, non-tender. Bowel sounds normal. No masses,  No      organomegaly. Extremities:   Extremities normal, atraumatic, no edema. Neurologic:   CN II-XII grossly intact.  No gross focal deficits           Recent Labs:  Lab Results   Component Value Date/Time Cholesterol, total 169 09/23/2021 10:15 AM    HDL Cholesterol 39 09/23/2021 10:15 AM    LDL, calculated 83 09/23/2021 10:15 AM    Triglyceride 235 (H) 09/23/2021 10:15 AM    CHOL/HDL Ratio 4.3 09/23/2021 10:15 AM     Lab Results   Component Value Date/Time    Creatinine 1.91 (H) 09/23/2021 10:15 AM     Lab Results   Component Value Date/Time    BUN 30 (H) 09/23/2021 10:15 AM     Lab Results   Component Value Date/Time    Potassium 5.5 (H) 09/23/2021 10:15 AM     Lab Results   Component Value Date/Time    Hemoglobin A1c 7.6 (H) 09/23/2021 10:15 AM     Lab Results   Component Value Date/Time    HGB 12.6 09/23/2021 10:15 AM     Lab Results   Component Value Date/Time    PLATELET 159 06/82/1499 10:15 AM       Reviewed:  Past Medical History:   Diagnosis Date    CAD (coronary artery disease) 11/10/2016    NSTEMI & 2 stents    Deafness 10/28/2012    DM (di[facility] mellitus) [facility]     Elevated cholesterol     Hypertension     NSTEMI (non-ST elevated myocardial infarction) ([facility] 11/10/2016     Social History     Tobacco Use   Smoking Status Never Smoker   Smokeless Tobacco Never Used     Social History     Substance and Sexual Activity   Alcohol Use Yes    Alcohol/week: 2.0 standard drinks    Types: 1 Cans of beer, 1 Shots of liquor per week    Comment: 1 DRINK DAILY     No Known Allergies  Family History   Problem Relation Age of Onset    Heart Disease Father     Heart Attack Father     Hypertension Mother    [facility] Elevated Lipids Brother     Elevated Lipids Brother     No Known Problems Sister     Elevated Lipids Brother     No Known Problems Son     No Known Problems Daughter     Anesth Problems Neg Hx         Current Outpatient Medications   Medication Sig    glipiZIDE (GLUCOTROL) 5 mg tablet Take 1 tablet by mouth twice daily    hydroCHLOROthiazide (HYDRODIURIL) 25 mg tablet Take 1 tablet by mouth once daily    Jardiance 10 mg tablet Take 1 tablet by mouth once daily    atorvastatin (LIPITOR) 20 mg tablet Take 1 Tablet by mouth nightly.  furosemide (LASIX) 20 mg tablet Take 1 tablet by mouth once daily    Januvia 50 mg tablet Take 1 tablet by mouth once daily    isosorbide mononitrate ER (IMDUR) 30 mg tablet Take 1 tablet by mouth once daily    carvediloL (COREG) 6.25 mg tablet Take 1 Tablet by mouth two (2) times daily (with meals).  metFORMIN (GLUCOPHAGE) 1,000 mg tablet TAKE 1 TABLET BY MOUTH TWICE DAILY WITH MEALS    aspirin delayed-release 81 mg tablet Take 1 Tab by mouth. No current facility-administered medications for this visit. Phill Collier MD    ATTENTION:   This medical record was transcribed using an electronic medical records/speech recognition system. Although proofread, it may and can contain electronic, spelling and other errors. Corrections may be executed at a later time. Please feel free to contact us for any clarifications as needed.     [facility] heart and Vascular [place]  [employer], [employer],  [facility], [address] [facility] [address] [phone]

## 2022-01-19 NOTE — LETTER
1/19/2022    Patient: Marylen Coles   YOB: 1950   Date of Visit: 1/19/2022     Martha Moore MD  [address] [address]  Via In Basket    Dear Martha Moore MD,      Thank you for referring Mr. Marylen Coles to [address]  for evaluation. My notes for this consultation are attached. If you have questions, please do not hesitate to call me. I look forward to following your patient along with you.       Sincerely,    Maribel Knapp MD

## 2022-01-27 ENCOUNTER — TELEPHONE (OUTPATIENT)
Dept: FAMILY MEDICINE CLINIC | Age: 72
End: 2022-01-27

## 2022-01-27 NOTE — TELEPHONE ENCOUNTER
He is due for his 4 months follow up office visit, can we schedule that one for instead of virtual in March ?   thanks

## 2022-01-27 NOTE — TELEPHONE ENCOUNTER
Patient's wife called stated had triple bypass in the pass the doctor wants to put a stint in , but patient wants to come in to discuss with provider first.  No available appointment. Wife wants the nurse to give her a call.     Requesting a call back    Best call back #[phone]

## 2022-01-27 NOTE — TELEPHONE ENCOUNTER
returned call to patient's wife as per PHI. Name and  verified. Attempted to schedule for a virtual today at 1:00pm but wife stated that pt is unable to do a virtual today. They are not sure when he is supposed to do the procedure. scheduled for a virtual on 3/1/22 at 1:00pm. Will let them know if we got any cancellations.     JULIA  Thanks

## 2022-02-03 ENCOUNTER — OFFICE VISIT (OUTPATIENT)
Dept: FAMILY MEDICINE CLINIC | Age: 72
End: 2022-02-03
Payer: MEDICARE

## 2022-02-03 VITALS
HEART RATE: 81 BPM | DIASTOLIC BLOOD PRESSURE: 63 MMHG | BODY MASS INDEX: 20.35 KG/M2 | SYSTOLIC BLOOD PRESSURE: 104 MMHG | TEMPERATURE: 97.1 F | WEIGHT: 137.4 LBS | OXYGEN SATURATION: 98 % | HEIGHT: 69 IN | RESPIRATION RATE: 18 BRPM

## 2022-02-03 DIAGNOSIS — N18.30 CKD STAGE 3 DUE TO TYPE 2 DIABETES MELLITUS (HCC): ICD-10-CM

## 2022-02-03 DIAGNOSIS — I35.0 NONRHEUMATIC AORTIC VALVE STENOSIS: ICD-10-CM

## 2022-02-03 DIAGNOSIS — Z00.00 MEDICARE ANNUAL WELLNESS VISIT, SUBSEQUENT: Primary | ICD-10-CM

## 2022-02-03 DIAGNOSIS — E11.65 TYPE 2 DIABETES MELLITUS WITH HYPERGLYCEMIA, WITHOUT LONG-TERM CURRENT USE OF INSULIN (HCC): ICD-10-CM

## 2022-02-03 DIAGNOSIS — E78.5 DYSLIPIDEMIA, GOAL LDL BELOW 100: ICD-10-CM

## 2022-02-03 DIAGNOSIS — Z13.31 SCREENING FOR DEPRESSION: ICD-10-CM

## 2022-02-03 DIAGNOSIS — Z12.5 SPECIAL SCREENING FOR MALIGNANT NEOPLASM OF PROSTATE: ICD-10-CM

## 2022-02-03 DIAGNOSIS — R35.1 BPH ASSOCIATED WITH NOCTURIA: ICD-10-CM

## 2022-02-03 DIAGNOSIS — Z95.1 S/P CABG X 3: ICD-10-CM

## 2022-02-03 DIAGNOSIS — N40.1 BPH ASSOCIATED WITH NOCTURIA: ICD-10-CM

## 2022-02-03 DIAGNOSIS — E11.22 CKD STAGE 3 DUE TO TYPE 2 DIABETES MELLITUS (HCC): ICD-10-CM

## 2022-02-03 DIAGNOSIS — I10 HYPERTENSION, ESSENTIAL: ICD-10-CM

## 2022-02-03 DIAGNOSIS — Z71.89 ADVANCED DIRECTIVES, COUNSELING/DISCUSSION: ICD-10-CM

## 2022-02-03 LAB
ALBUMIN SERPL-MCNC: 4.2 G/DL (ref 3.5–5)
ALBUMIN/GLOB SERPL: 1 {RATIO} (ref 1.1–2.2)
ALP SERPL-CCNC: 102 U/L (ref 45–117)
ALT SERPL-CCNC: 37 U/L (ref 12–78)
ANION GAP SERPL CALC-SCNC: 6 MMOL/L (ref 5–15)
AST SERPL-CCNC: 21 U/L (ref 15–37)
BASOPHILS # BLD: 0.1 K/UL (ref 0–0.1)
BASOPHILS NFR BLD: 2 % (ref 0–1)
BILIRUB SERPL-MCNC: 0.4 MG/DL (ref 0.2–1)
BUN SERPL-MCNC: 30 MG/DL (ref 6–20)
BUN/CREAT SERPL: 17 (ref 12–20)
CALCIUM SERPL-MCNC: 10.2 MG/DL (ref 8.5–10.1)
CHLORIDE SERPL-SCNC: 104 MMOL/L (ref 97–108)
CHOLEST SERPL-MCNC: 173 MG/DL
CO2 SERPL-SCNC: 28 MMOL/L (ref 21–32)
CREAT SERPL-MCNC: 1.79 MG/DL (ref 0.7–1.3)
DIFFERENTIAL METHOD BLD: ABNORMAL
EOSINOPHIL # BLD: 0.8 K/UL (ref 0–0.4)
EOSINOPHIL NFR BLD: 10 % (ref 0–7)
ERYTHROCYTE [DISTWIDTH] IN BLOOD BY AUTOMATED COUNT: 17.4 % (ref 11.5–14.5)
EST. AVERAGE GLUCOSE BLD GHB EST-MCNC: 206 MG/DL
GLOBULIN SER CALC-MCNC: 4.1 G/DL (ref 2–4)
GLUCOSE SERPL-MCNC: 159 MG/DL (ref 65–100)
HBA1C MFR BLD: 8.8 % (ref 4–5.6)
HCT VFR BLD AUTO: 40.6 % (ref 36.6–50.3)
HDLC SERPL-MCNC: 47 MG/DL
HDLC SERPL: 3.7 {RATIO} (ref 0–5)
HGB BLD-MCNC: 11.9 G/DL (ref 12.1–17)
IMM GRANULOCYTES # BLD AUTO: 0 K/UL (ref 0–0.04)
IMM GRANULOCYTES NFR BLD AUTO: 0 % (ref 0–0.5)
LDLC SERPL CALC-MCNC: 77.8 MG/DL (ref 0–100)
LYMPHOCYTES # BLD: 1.8 K/UL (ref 0.8–3.5)
LYMPHOCYTES NFR BLD: 21 % (ref 12–49)
MCH RBC QN AUTO: 23.7 PG (ref 26–34)
MCHC RBC AUTO-ENTMCNC: 29.3 G/DL (ref 30–36.5)
MCV RBC AUTO: 80.9 FL (ref 80–99)
MONOCYTES # BLD: 0.7 K/UL (ref 0–1)
MONOCYTES NFR BLD: 8 % (ref 5–13)
NEUTS SEG # BLD: 4.9 K/UL (ref 1.8–8)
NEUTS SEG NFR BLD: 59 % (ref 32–75)
NRBC # BLD: 0 K/UL (ref 0–0.01)
NRBC BLD-RTO: 0 PER 100 WBC
PLATELET # BLD AUTO: 255 K/UL (ref 150–400)
PMV BLD AUTO: 12 FL (ref 8.9–12.9)
POTASSIUM SERPL-SCNC: 4.8 MMOL/L (ref 3.5–5.1)
PROT SERPL-MCNC: 8.3 G/DL (ref 6.4–8.2)
PSA SERPL-MCNC: 1.1 NG/ML (ref 0.01–4)
RBC # BLD AUTO: 5.02 M/UL (ref 4.1–5.7)
SODIUM SERPL-SCNC: 138 MMOL/L (ref 136–145)
TRIGL SERPL-MCNC: 241 MG/DL (ref ?–150)
TSH SERPL DL<=0.05 MIU/L-ACNC: 3.53 UIU/ML (ref 0.36–3.74)
VLDLC SERPL CALC-MCNC: 48.2 MG/DL
WBC # BLD AUTO: 8.4 K/UL (ref 4.1–11.1)

## 2022-02-03 PROCEDURE — G8536 NO DOC ELDER MAL SCRN: HCPCS | Performed by: FAMILY MEDICINE

## 2022-02-03 PROCEDURE — G8754 DIAS BP LESS 90: HCPCS | Performed by: FAMILY MEDICINE

## 2022-02-03 PROCEDURE — 1101F PT FALLS ASSESS-DOCD LE1/YR: CPT | Performed by: FAMILY MEDICINE

## 2022-02-03 PROCEDURE — G8420 CALC BMI NORM PARAMETERS: HCPCS | Performed by: FAMILY MEDICINE

## 2022-02-03 PROCEDURE — 2022F DILAT RTA XM EVC RTNOPTHY: CPT | Performed by: FAMILY MEDICINE

## 2022-02-03 PROCEDURE — G0439 PPPS, SUBSEQ VISIT: HCPCS | Performed by: FAMILY MEDICINE

## 2022-02-03 PROCEDURE — G8510 SCR DEP NEG, NO PLAN REQD: HCPCS | Performed by: FAMILY MEDICINE

## 2022-02-03 PROCEDURE — G8752 SYS BP LESS 140: HCPCS | Performed by: FAMILY MEDICINE

## 2022-02-03 PROCEDURE — 99214 OFFICE O/P EST MOD 30 MIN: CPT | Performed by: FAMILY MEDICINE

## 2022-02-03 PROCEDURE — G8427 DOCREV CUR MEDS BY ELIG CLIN: HCPCS | Performed by: FAMILY MEDICINE

## 2022-02-03 PROCEDURE — 3017F COLORECTAL CA SCREEN DOC REV: CPT | Performed by: FAMILY MEDICINE

## 2022-02-03 PROCEDURE — 3046F HEMOGLOBIN A1C LEVEL >9.0%: CPT | Performed by: FAMILY MEDICINE

## 2022-02-03 RX ORDER — ALBENDAZOLE 200 MG/1
400 TABLET, FILM COATED ORAL ONCE
Qty: 4 TABLET | Refills: 0 | Status: SHIPPED | OUTPATIENT
Start: 2022-02-03 | End: 2022-05-26

## 2022-02-03 RX ORDER — TAMSULOSIN HYDROCHLORIDE 0.4 MG/1
0.4 CAPSULE ORAL DAILY
Qty: 30 CAPSULE | Refills: 3 | Status: SHIPPED | OUTPATIENT
Start: 2022-02-03 | End: 2022-05-26

## 2022-02-03 NOTE — PATIENT INSTRUCTIONS
Medicare Wellness Visit, Male    The best way to live healthy is to have a lifestyle where you eat a well-balanced diet, exercise regularly, limit alcohol use, and quit all forms of tobacco/nicotine, if applicable. Regular preventive services are another way to keep healthy. Preventive services (vaccines, screening tests, monitoring & exams) can help personalize your care plan, which helps you manage your own care. Screening tests can find health problems at the earliest stages, when they are easiest to treat. [facility] follows the current, evidence-based guidelines published by the [employer] [facility] ([facility]) when recommending preventive services for our patients. Because we follow these guidelines, sometimes recommendations change over time as research supports it. (For example, a prostate screening blood test is no longer routinely recommended for men with no symptoms). Of course, you and your doctor may decide to screen more often for some diseases, based on your risk and co-morbidities (chronic disease you are already diagnosed with). Preventive services for you include:  - Medicare offers their members a free annual wellness visit, which is time for you and your primary care provider to discuss and plan for your preventive service needs. Take advantage of this benefit every year!  -All adults over age 72 should receive the recommended pneumonia vaccines. Current USPSTF guidelines recommend a series of two vaccines for the best pneumonia protection.   -All adults should have a flu vaccine yearly and tetanus vaccine every 10 years.  -All adults age 48 and older should receive the shingles vaccines (series of two vaccines).        -All adults age 38-68 who are overweight should have a diabetes screening test once every three years.   -Other screening tests & preventive services for persons with diabetes include: an eye exam to screen for diabetic retinopathy, a kidney function test, a foot exam, and stricter control over your cholesterol.   -Cardiovascular screening for adults with routine risk involves an electrocardiogram (ECG) at intervals determined by the provider.   -Colorectal cancer screening should be done for adults age 54-65 with no increased risk factors for colorectal cancer. There are a number of acceptable methods of screening for this type of cancer. Each test has its own benefits and drawbacks. Discuss with your provider what is most appropriate for you during your annual wellness visit. The different tests include: colonoscopy (considered the best screening method), a fecal occult blood test, a fecal DNA test, and sigmoidoscopy.  -All adults born between [facility] should be screened once for Hepatitis C.  -An Abdominal Aortic Aneurysm (AAA) Screening is recommended for men age 73-68 who has ever smoked in their lifetime. Here is a list of your current Health Maintenance items (your personalized list of preventive services) with a due date:  Health Maintenance Due   Topic Date Due    Eye Exam  08/01/2021    COVID-19 Vaccine (3 - Booster) 08/05/2021          Learning About Jah Patel  What is a living will? A living will is a legal form you use to write down the kind of care you want at the end of your life. It is used by the health professionals who will treat you if you aren't able to decide for yourself. If you put your wishes in writing, your loved ones and others will know what kind of care you want. They won't need to guess. This can ease your mind and be helpful to others. A living will is not the same as an estate or property will. An estate will explains what you want to happen with your money and property after you die. Is a living will a legal document? A living will is a legal document. Each state has its own laws about living mireles.  If you move to another state, make sure that your living will is legal in the state where you now live. Or you might use a universal form that has been approved by many states. This kind of form can sometimes be completed and stored online. Your electronic copy will then be available wherever you have a connection to the Internet. In most cases, doctors will respect your wishes even if you have a form from a different state. · You don't need an  to complete a living will. But legal advice can be helpful if your state's laws are unclear, your health history is complicated, or your family can't agree on what should be in your living will. · You can change your living will at any time. Some people find that their wishes about end-of-life care change as their health changes. · In addition to making a living will, think about completing a medical power of  form. This form lets you name the person you want to make end-of-life treatment decisions for you (your \"health care agent\") if you're not able to. Many hospitals and nursing homes will give you the forms you need to complete a living will and a medical power of . · Your living will is used only if you can't make or communicate decisions for yourself anymore. If you become able to make decisions again, you can accept or refuse any treatment, no matter what you wrote in your living will. · Your state may offer an online registry. This is a place where you can store your living will online so the doctors and nurses who need to treat you can find it right away. What should you think about when creating a living will? Talk about your end-of-life wishes with your family members and your doctor. Let them know what you want. That way the people making decisions for you won't be surprised by your choices. Think about these questions as you make your living will:  · Do you know enough about life support methods that might be used?  If not, talk to your doctor so you know what might be done if you can't breathe on your own, your heart stops, or you're unable to swallow. · What things would you still want to be able to do after you receive life-support methods? Would you want to be able to walk? To speak? To eat on your own? To live without the help of machines? · If you have a choice, where do you want to be cared for? In your home? At a hospital or nursing home? · Do you want certain Quaker practices performed if you become very ill? · If you have a choice at the end of your life, where would you prefer to die? At home? In a hospital or nursing home? Somewhere else? · Would you prefer to be buried or cremated? · Do you want your organs to be donated after you die? What should you do with your living will? · Make sure that your family members and your health care agent have copies of your living will. · Give your doctor a copy of your living will to keep in your medical record. If you have more than one doctor, make sure that each one has a copy. · You may want to put a copy of your living will where it can be easily found. Where can you learn more? Go to http://www.gray.com/. Enter E045 in the search box to learn more about \"Learning About Living Perroy. \"  Current as of: August 8, 2016  Content Version: 11.3  © 6986-7421 [employer]. Care instructions adapted under license by [employer] (which disclaims liability or warranty for this information). If you have questions about a medical condition or this instruction, always ask your healthcare professional. [place] [address] any warranty or liability for your use of this information. Preventing Falls: Care Instructions  Your Care Instructions    Getting around your home safely can be a challenge if you have injuries or health problems that make it easy for you to fall.  Loose rugs and furniture in walkways are among the dangers for many older people who have problems walking or who have poor eyesight. People who have conditions such as arthritis, osteoporosis, or dementia also have to be careful not to fall. You can make your home safer with a few simple measures. Follow-up care is a key part of your treatment and safety. Be sure to make and go to all appointments, and call your doctor if you are having problems. It's also a good idea to know your test results and keep a list of the medicines you take. How can you care for yourself at home? Taking care of yourself  · You may get dizzy if you do not drink enough water. To prevent dehydration, drink plenty of fluids, enough so that your urine is light yellow or clear like water. Choose water and other caffeine-free clear liquids. If you have kidney, heart, or liver disease and have to limit fluids, talk with your doctor before you increase the amount of fluids you drink. · Exercise regularly to improve your strength, muscle tone, and balance. Walk if you can. Swimming may be a good choice if you cannot walk easily. · Have your vision and hearing checked each year or any time you notice a change. If you have trouble seeing and hearing, you might not be able to avoid objects and could lose your balance. · Know the side effects of the medicines you take. Ask your doctor or pharmacist whether the medicines you take can affect your balance. Sleeping pills or sedatives can affect your balance. · Limit the amount of alcohol you drink. Alcohol can impair your balance and other senses. · Ask your doctor whether calluses or corns on your feet need to be removed. If you wear loose-fitting shoes because of calluses or corns, you can lose your balance and fall. · Talk to your doctor if you have numbness in your feet. Preventing falls at home  · Remove raised doorway thresholds, throw rugs, and clutter. Repair loose carpet or raised areas in the floor. · Move furniture and electrical cords to keep them out of walking paths.   · Use nonskid floor wax, and wipe up spills right away, especially on ceramic tile floors. · If you use a walker or cane, put rubber tips on it. If you use crutches, clean the bottoms of them regularly with an abrasive pad, such as steel wool. · Keep your house well lit, especially [place] Flood, and outside walkways. Use night-lights in areas such as hallways and bathrooms. Add extra light switches or use remote switches (such as switches that go on or off when you clap your hands) to make it easier to turn lights on if you have to get up during the night. · Install sturdy handrails on stairways. · Move items in your cabinets so that the things you use a lot are on the lower shelves (about waist level). · Keep a cordless phone and a flashlight with new batteries by your bed. If possible, put a phone in each of the main rooms of your house, or carry a cell phone in case you fall and cannot reach a phone. Or, you can wear a device around your neck or wrist. You push a button that sends a signal for help. · Wear low-heeled shoes that fit well and give your feet good support. Use footwear with nonskid soles. Check the heels and soles of your shoes for wear. Repair or replace worn heels or soles. · Do not wear socks without shoes on wood floors. · Walk on the grass when the sidewalks are slippery. If you live in an area that gets snow and ice in the winter, sprinkle salt on slippery steps and sidewalks. Preventing falls in the bath  · Install grab bars and nonskid mats inside and outside your shower or tub and near the toilet and sinks. · Use shower chairs and bath benches. · Use a hand-held shower head that will allow you to sit while showering. · Get into a tub or shower by putting the weaker leg in first. Get out of a tub or shower with your strong side first.  · Repair loose toilet seats and consider installing a raised toilet seat to make getting on and off the toilet easier.   · Keep your bathroom door unlocked while you are in the shower. Where can you learn more? Go to http://www.mcdaniel.com/. Enter [id number] [id number] [phone] in the search box to learn more about \"Preventing Falls: Care Instructions. \"  Current as of: March 16, 2018  Content Version: 11.8  © 0879-3306 [employer], [employer]. Care instructions adapted under license by [employer] (which disclaims liability or warranty for this information). If you have questions about a medical condition or this instruction, always ask your healthcare professional. [place] [address] any warranty or liability for your use of this information.

## 2022-02-03 NOTE — PROGRESS NOTES
This is the Subsequent Medicare Annual Wellness Exam, performed 12 months or more after the Initial AWV or the last Subsequent AWV    I have reviewed the patient's medical history in detail and updated the computerized patient record. We did a Medicare Wellness evaluation including a review of past, family, and social histories with attention to age/sex appropriate screening, risk assessment, preventive care and counseling. Any cognitive, fall risk, or ADL issues identified are addressed separately. A patient specific preventive care plan was provided and appropriate screening tests were ordered as specified. Assessment/Plan   Education and counseling provided:  Are appropriate based on today's review and evaluation    1. Medicare annual wellness visit, subsequent  2. Advanced directives, counseling/discussion  -     ADVANCE CARE PLANNING FIRST 30 MINS  3. Screening for depression  -     DEPRESSION SCREEN ANNUAL  4. CKD stage 3 due to type 2 diabetes mellitus (HCC)  -     CBC WITH AUTOMATED DIFF; Future  5. Type 2 diabetes mellitus with hyperglycemia, without long-term current use of insulin (HCC)  -     HEMOGLOBIN A1C WITH EAG; Future  6. S/P CABG x 3  -     LIPID PANEL; Future  7. Dyslipidemia, goal LDL below 100  -     LIPID PANEL; Future  -     METABOLIC PANEL, COMPREHENSIVE; Future  -     TSH 3RD GENERATION; Future  8. Hypertension, essential  -     METABOLIC PANEL, COMPREHENSIVE; Future  9. Nonrheumatic aortic valve stenosis  10. BPH associated with nocturia  -     PSA SCREENING (SCREENING); Future  -     tamsulosin (FLOMAX) 0.4 mg capsule; Take 1 Capsule by mouth daily. , Normal, Disp-30 Capsule, R-3  11. Special screening for malignant neoplasm of prostate  -     PSA SCREENING (SCREENING);  Future       Depression Risk Factor Screening     3 most recent PHQ Screens 2/3/2022   Little interest or pleasure in doing things Not at all   Feeling down, depressed, irritable, or hopeless Not at all   Total Score PHQ 2 0   We spent 10 minutes on importance of depression screening . Depression screening is a way to see if you have depression symptoms. It may be done by a doctor or counselor. It's often part of a routine checkup. That's because your mental health is just as important as your physical health. Depression is a medical illness that affects how you feel, think, and act. You may:  · Have less energy. · Lose interest in your daily activities. · Feel sad and grouchy for a long time. Depression is very common. It affects men and women of all ages. Many things can trigger depression. Some people become depressed after they have a stroke or find out they have a major illness like cancer or heart disease. The death of a loved one, a breakup, or changes in the natural brain chemicals may lead to depression. It can run in families. Most experts believe that a combination of inherited genes and stressful life events can cause it. Alcohol & Drug Abuse Risk Screen    Do you average more than 1 drink per night or more than 7 drinks a week: No    In the past three months have you have had more than 4 drinks containing alcohol on one occasion: No          Functional Ability and Level of Safety    Hearing: The patient wears hearing aids. Activities of Daily Living: The home contains: handrails and grab bars  Patient does total self care      Ambulation: with no difficulty     Fall Risk:  Fall Risk Assessment, last 12 mths 2/3/2022   Able to walk? Yes   Fall in past 12 months? 0   Do you feel unsteady?  0   Are you worried about falling 0      Abuse Screen:  Patient is not abused       Cognitive Screening    Has your family/caregiver stated any concerns about your memory: no     Cognitive Screening: Normal - MMSE (Mini Mental Status Exam)  28/30 missed 1/3 word recalls and letter R in spelling back [address] Maintenance Due     Health Maintenance Due   Topic Date Due    Eye Exam Retinal or Dilated  08/01/2021    COVID-19 Vaccine (3 - Booster) 08/05/2021       Patient Care Team   Patient Care Team:  Kita Dalal MD as PCP - General (Family Medicine)  Kita Dalal MD as PCP - [address] [place] [address] Provider  Kaylan Segal MD (Cardiology)  Alexandria Mccartney MD (Gastroenterology)  Cody Jon MD (Cardiothoracic Surgery)  Asiya Perez MD (Cardiology)    History     Patient Active Problem List   Diagnosis Code    Cramp of limb R25.2    Deafness H91.90    Type 2 diabetes mellitus with hyperglycemia ([facility] E11.65    Nonrheumatic aortic valve stenosis I35.0    Bruit of right carotid artery R09.89    Colon cancer screening Z12.11    Coronary artery disease involving native coronary artery of native heart without angina pectoris I25.10    Hypercholesteremia E78.00    Hypertension, essential I10    S/P CABG x 3 Z95.1    CKD stage 3 due to type 2 diabetes mellitus ([facility] E11.22, N18.30     Past Medical History:   Diagnosis Date    CAD (coronary artery disease) 11/10/2016    NSTEMI & 2 stents    Deafness 10/28/2012    DM (diabetes mellitus) ([facility]     Elevated cholesterol     Hypertension     NSTEMI (non-ST elevated myocardial infarction) ([facility] 11/10/2016      Past Surgical History:   Procedure Laterality Date    COLONOSCOPY N/A 6/28/2018    COLONOSCOPY performed by Gaby Kamara MD at [address] HX APPENDECTOMY      [id number] [facility]  11/11/2016    2 stents     Current Outpatient Medications   Medication Sig Dispense Refill    tamsulosin (FLOMAX) 0.4 mg capsule Take 1 Capsule by mouth daily. 30 Capsule 3    glipiZIDE (GLUCOTROL) 5 mg tablet Take 1 tablet by mouth twice daily 180 Tablet 0    hydroCHLOROthiazide (HYDRODIURIL) 25 mg tablet Take 1 tablet by mouth once daily 90 Tablet 0    Jardiance 10 mg tablet Take 1 tablet by mouth once daily 30 Tablet 2    atorvastatin (LIPITOR) 20 mg tablet Take 1 Tablet by mouth nightly.  90 Tablet 3    furosemide (LASIX) 20 mg tablet Take 1 tablet by mouth once daily 90 Tablet 0    Januvia 50 mg tablet Take 1 tablet by mouth once daily 90 Tablet 0    isosorbide mononitrate ER (IMDUR) 30 mg tablet Take 1 tablet by mouth once daily 90 Tablet 0    metFORMIN (GLUCOPHAGE) 1,000 mg tablet TAKE 1 TABLET BY MOUTH TWICE DAILY WITH MEALS 180 Tablet 0    aspirin delayed-release 81 mg tablet Take 1 Tab by mouth.  carvediloL (COREG) 6.25 mg tablet Take 1 Tablet by mouth two (2) times daily (with meals). (Patient not taking: Reported on 2/3/2022) 180 Tablet 1     No Known Allergies    Family History   Problem Relation Age of Onset    Heart Disease Father     Heart Attack Father     Hypertension Mother    [facility] Elevated Lipids Brother     Elevated Lipids Brother     No Known Problems Sister     Elevated Lipids Brother     No Known Problems Son     No Known Problems Daughter     Anesth Problems Neg Hx      Social History     Tobacco Use    Smoking status: Never Smoker    Smokeless tobacco: Never Used   Substance Use Topics    Alcohol use:  Yes     Alcohol/week: 2.0 standard drinks     Types: 1 Cans of beer, 1 Shots of liquor per week     Comment: [address]         Luis Manuel Avila MD

## 2022-02-03 NOTE — PROGRESS NOTES
HISTORY OF PRESENT ILLNESS  Sofia Messer is a 80 y.o. male. Patient was seen today for Medicare wellness visit as well as to discuss his name and worsening shortness of breath on exertion. He was recently evaluated by cardiology and he wants to discuss his findings and recommendations. HPI   Cardiovascular Review  The patient has hypertension, hyperlipidemia, coronary artery disease, status post coronary artery stenting and had 2 vessel PCI on 11/11/2016, s/p CABG x 3 on 07/09/2018. [facility] reports taking medications as instructed, no medication side effects noted, notes stable dyspnea on exertion, no change, no swelling of ankles, no orthostatic dizziness or lightheadedness, no orthopnea or paroxysmal nocturnal dyspnea, no palpitations, no intermittent claudication symptoms.  Diet and Lifestyle: generally follows a low fat low cholesterol diet, generally follows a low sodium diet, nonsmoker.  Lab review: labs reviewed and discussed with patient.  Follows Jinny  Had echocardiogram and nuclear cardiac stress test on March 15, 2021.  Stress test was significant for  · Myocardial perfusion imaging defect 1: There is a defect that is medium in size present in the apical and inferolateral location(s) that is reversible. The defect appears to be ischemia. Perfusion defect was visually and quantitatively present. Myocardial perfusion imaging defect 2: There is a defect that is medium in size affecting the mid and inferolateral location(s) that is non-reversible. The defect appears to be infarction.     He had left heart catheterization with coronary angiogram on 09/08/21 that showed significant three-vessel disease.   2 out of 3 grafts patent with occluded SVG to    Cardiology has decided to undergo PCI worsening fatigue and shortness of breath.        Medicines:ASA,Carvedilol ( on hold) , Candesartan 32 mg ( on hold), Hctz 25 mg, Atorvastatin 20 mg,Lasix 20 mg and Imdur 30 mg ER   Endocrine Review  He is seen for diabetes.  Since last visit he reports: no polyuria or polydipsia, no chest pain, dyspnea or TIA's, no numbness, tingling or pain in extremities, no unusual visual symptoms, weight has decreased, no significant changes.  Home glucose monitoring: is performed sporadically, nonfasting values range 160-200  He is checking his sugars one per day. Janine Toure reports medication compliance: compliant all of the time.  Medication side effects: none.  Diabetic diet compliance: noncompliant some of the time.  Lab review: labs reviewed and discussed with patient.  Eye exam: UTD.    On  Glipizide 5 mg BID (was decreased after last blood work report )and Jardiance 10 mg, Januvia 50 mg, Metformin 1 gm bid. n.     Lab Results   Component Value Date/Time    Hemoglobin A1c 7.6 (H) 09/23/2021 10:15 AM           CKD:  Stage of Chronic Kidney Disease III exhibited by:  Symptoms:none  Lab Results   Component Value Date/Time    Sodium 136 09/23/2021 10:15 AM    Potassium 5.5 (H) 09/23/2021 10:15 AM    Chloride 109 (H) 09/23/2021 10:15 AM    CO2 24 09/23/2021 10:15 AM    Anion gap 3 (L) 09/23/2021 10:15 AM    Glucose 77 09/23/2021 10:15 AM    BUN 30 (H) 09/23/2021 10:15 AM    Creatinine 1.91 (H) 09/23/2021 10:15 AM    BUN/Creatinine ratio 16 09/23/2021 10:15 AM    GFR est AA 43 (L) 09/23/2021 10:15 AM    GFR est non-AA 35 (L) 09/23/2021 10:15 AM    Calcium 9.8 09/23/2021 10:15 AM        Was referred to nephrologist Dr Myla Schulte, was recommended to change HCTZ 50 mg to 25 mg and to add Lasix 20 mg hyperkalemia.since recent hospital admission, his candesartan is on hold due to TANNER and hyperkalemia. Review of Systems   Constitutional: Positive for malaise/fatigue. Negative for chills and fever. HENT: Negative for congestion, ear pain, sore throat and tinnitus. Eyes: Negative for blurred vision, double vision, pain and discharge. Respiratory: Positive for shortness of breath (Worst with exertion). Negative for cough and wheezing. Cardiovascular: Negative for chest pain, palpitations and leg swelling. Gastrointestinal: Negative for abdominal pain, blood in stool, constipation, diarrhea, nausea and vomiting. Genitourinary: Negative for dysuria, frequency, hematuria and urgency. Musculoskeletal: Negative for back pain, joint pain and myalgias. Skin: Negative for rash. Neurological: Negative for dizziness, tremors, seizures and headaches. Endo/Heme/Allergies: Negative for polydipsia. Does not bruise/bleed easily. Psychiatric/Behavioral: Negative for depression and substance abuse. The patient is not nervous/anxious. Physical Exam  Vitals and nursing note reviewed. Constitutional:       Appearance: He is well-developed. He is not diaphoretic. HENT:      Head: Normocephalic and atraumatic. Right Ear: External ear normal.      Mouth/Throat:      Pharynx: No oropharyngeal exudate. Eyes:      General: No scleral icterus. Conjunctiva/sclera: Conjunctivae normal.      Pupils: Pupils are equal, round, and reactive to light. Neck:      Thyroid: No thyromegaly. Vascular: No JVD. Cardiovascular:      Rate and Rhythm: Normal rate and regular rhythm. Heart sounds: Murmur (Aortic systolic murmur) heard. Pulmonary:      Effort: Pulmonary effort is normal.      Breath sounds: Normal breath sounds. No wheezing. Abdominal:      General: Bowel sounds are normal. There is no distension. Palpations: Abdomen is soft. There is no mass. Musculoskeletal:         General: No tenderness. Normal range of motion. Cervical back: Normal range of motion and neck supple. Lymphadenopathy:      Cervical: No cervical adenopathy. Skin:     General: Skin is warm and dry. Findings: No rash. Neurological:      Mental Status: He is alert and oriented to person, place, and time. Cranial Nerves: No cranial nerve deficit. Deep Tendon Reflexes: Reflexes are normal and symmetric.  Reflexes normal. ASSESSMENT and PLAN  Diagnoses and all orders for this visit:    1. Medicare annual wellness visit, subsequent    2. Advanced directives, counseling/discussion  -     ADVANCE CARE PLANNING FIRST 30 MINS    3. Screening for depression  -     DEPRESSION SCREEN ANNUAL    4. CKD stage 3 due to type 2 diabetes mellitus ([facility]  Assessment & Plan:   monitored by specialist. No acute findings meriting change in the plan   Borderline controlled    Orders:  -     CBC WITH AUTOMATED DIFF; Future    5. Type 2 diabetes mellitus with hyperglycemia, without long-term current use of insulin ([facility].)  Assessment & Plan:   well controlled, continue current medications, continue current treatment plan, medication adherence emphasized, lifestyle modifications recommended    Orders:  -     HEMOGLOBIN A1C WITH EAG; Future    6. S/P CABG x 3  -     LIPID PANEL; Future    7. Dyslipidemia, goal LDL below 100  -     LIPID PANEL; Future  -     METABOLIC PANEL, COMPREHENSIVE; Future  -     TSH 3RD GENERATION; Future    8. Hypertension, essential  -     METABOLIC PANEL, COMPREHENSIVE; Future    9. Nonrheumatic aortic valve stenosis    10. BPH associated with nocturia  -     PSA SCREENING (SCREENING); Future  -     tamsulosin (FLOMAX) 0.4 mg capsule; Take 1 Capsule by mouth daily. 11. Special screening for malignant neoplasm of prostate  -     PSA SCREENING (SCREENING); Future    Discussed lifestyle issues and health guidance given  Patient was given an after visit summary which includes diagnoses, vital signs, current medications, instructions and references & authorized prescriptions . Results of labs will be conveyed to patient, once available. Pt verbalized instructions I provided and expressed understanding of discussion that was held today. Follow-up and Dispositions    · Return in about 4 months (around 6/3/2022) for follow up, fasting.          Please note that this dictation was completed with ice, the [employer] voice recognition software. Quite often unanticipated grammatical, syntax, homophones, and other interpretive errors are inadvertently transcribed by the computer software. Please disregard these errors. Please excuse any errors that have escaped final proofreading. Thank you.

## 2022-02-03 NOTE — PROGRESS NOTES
Chief Complaint   Patient presents with    Diabetes     follow up    Heart Problem     follow up         1. \"Have you been to the ER, urgent care clinic since your last visit? Hospitalized since your last visit? \" No    2. \"Have you seen or consulted any other health care providers outside of the [address] [place] [address] since your last visit? \" No     3. For patients over 45: Has the patient had a colonoscopy? No     If the patient is female:    4. For patients over 36: Has the patient had a mammogram? NA - based on age    11. For patients over 21: Has the patient had a pap smear? NA - based on age       1 most recent PHQ Screens 2/3/2022   Little interest or pleasure in doing things Not at all   Feeling down, depressed, irritable, or hopeless Not at all   Total Score PHQ 2 0     ADL Assessment 2/3/2022   Feeding yourself No Help Needed   Getting from bed to chair No Help Needed   Getting dressed No Help Needed   Bathing or showering No Help Needed   Walk across the room (includes cane/walker) No Help Needed   Using the telphone No Help Needed   Taking your medications No Help Needed   Preparing meals No Help Needed   Managing money (expenses/bills) No Help Needed   Moderately strenuous housework (laundry) No Help Needed   Shopping for personal items (toiletries/medicines) No Help Needed   Shopping for groceries No Help Needed   Driving No Help Needed   Climbing a flight of stairs No Help Needed   Getting to places beyond walking distances No Help Needed     Abuse Screening Questionnaire 2/3/2022   Do you ever feel afraid of your partner? N   Are you in a relationship with someone who physically or mentally threatens you? N   Is it safe for you to go home? Y     Fall Risk Assessment, last 12 mths 2/3/2022   Able to walk? Yes   Fall in past 12 months? 0   Do you feel unsteady?  0   Are you worried about falling 0                   Health Maintenance Due   Topic Date Due    Eye Exam Retinal or Dilated  08/01/2021  COVID-19 Vaccine (3 - Booster) 08/05/2021    Medicare Yearly Exam  01/21/2022

## 2022-02-03 NOTE — PROGRESS NOTES
Wannetta Cushing,  I have reviewed your results. Kidney function has improved from your previous results still it is significantly abnormal.  I will route your results to nephrology, Dr. Stefania Garg. Surprisingly your sugar is up, both fasting and average, making you at high risk of cardiovascular complication. Just making sure you take your Metformin, glipizide Jardiance and Januvia regularly. If it stays high, we may have to consider insulin. Blood count shows low hemoglobin, most likely related to your acute conditions. You can use an over-the-counter is significantly elevated, consistent with allergies. Sometimes we see elevated eosinophil count and low hemoglobin in patients with intestinal worms. I will send a course of treatment for warms please complete. Your prostate cancer screen is very normal, please start taking medicine I have prescribed today. Please let me know if you have any questions, thanks.

## 2022-02-03 NOTE — ACP (ADVANCE CARE PLANNING)
Advance Care Planning     Advance Care Planning (ACP) Physician/NP/PA Conversation      Date of Conversation: 2/3/2022  Conducted with: Patient with Decision Making Capacity    Healthcare Decision Maker:     Primary Decision Maker: Susanna Kendall - Spouse - [phone]    Secondary Decision Maker: Ita Garland - Daughter - [phone]  Click here to complete [address] including selection of the Healthcare Decision Maker Relationship (ie \"Primary\")        Today we documented Decision Maker(s) consistent with Legal Next of Kin hierarchy. Care Preferences:    Hospitalization: \"If your health worsens and it becomes clear that your chance of recovery is unlikely, what would be your preference regarding hospitalization? \"  The patient would prefer hospitalization. Ventilation: \"If you were unable to breathe on your own and your chance of recovery was unlikely, what would be your preference about the use of a ventilator (breathing machine) if it was available to you? \"   The patient would desire the use of a ventilator. Resuscitation: \"In the event your heart stopped as a result of an underlying serious health condition, would you want attempts to be made to restart your heart, or would you prefer a natural death? \"   Yes, attempt to resuscitate. Additional topics discussed: treatment goals, benefit/burden of treatment options, artificial nutrition, ventilation preferences, hospitalization preferences, resuscitation preferences, end of life care preferences (vegetative state/imminent death) and hospice care    Conversation Outcomes / Follow-Up Plan:   ACP in process - information provided, considering goals and options  According to patient, they have submitted completed directive. Not scanned into system.   Requested to complete another one  Reviewed DNR/DNI and patient elects Full Code (Attempt Resuscitation)     Length of Voluntary ACP Conversation in minutes:  16 minutes    Kelly Jessica MD

## 2022-02-18 ENCOUNTER — PATIENT MESSAGE (OUTPATIENT)
Dept: FAMILY MEDICINE CLINIC | Age: 72
End: 2022-02-18

## 2022-02-21 NOTE — TELEPHONE ENCOUNTER
From: Lance Kendall  To:  Josue Fierro MD  Sent: 2/18/2022 9:52 AM EST  Subject: Referral    Hi   I received a phone call and they said a referral was denied, what do I need to do to get a referral.  My procedure is for Monday 28 February for a stent  Thank You  Albert Patel

## 2022-02-24 ENCOUNTER — HOSPITAL ENCOUNTER (OUTPATIENT)
Dept: PREADMISSION TESTING | Age: 72
Discharge: HOME | End: 2022-02-24
Attending: INTERNAL MEDICINE
Payer: MEDICARE

## 2022-02-24 ENCOUNTER — TRANSCRIBE ORDER (OUTPATIENT)
Dept: REGISTRATION | Age: 72
End: 2022-02-24

## 2022-02-24 DIAGNOSIS — Z01.818 PRE-OP TESTING: ICD-10-CM

## 2022-02-24 DIAGNOSIS — U07.1 COVID-19: Primary | ICD-10-CM

## 2022-02-24 DIAGNOSIS — U07.1 COVID-19: ICD-10-CM

## 2022-02-24 DIAGNOSIS — I25.10 CORONARY ARTERY DISEASE INVOLVING NATIVE CORONARY ARTERY OF NATIVE HEART WITHOUT ANGINA PECTORIS: ICD-10-CM

## 2022-02-24 DIAGNOSIS — I35.0 NONRHEUMATIC AORTIC VALVE STENOSIS: ICD-10-CM

## 2022-02-24 PROCEDURE — U0005 INFEC AGEN DETEC AMPLI PROBE: HCPCS

## 2022-02-25 LAB
ANION GAP SERPL CALC-SCNC: 4 MMOL/L (ref 5–15)
BUN SERPL-MCNC: 26 MG/DL (ref 6–20)
BUN/CREAT SERPL: 15 (ref 12–20)
CALCIUM SERPL-MCNC: 9.4 MG/DL (ref 8.5–10.1)
CHLORIDE SERPL-SCNC: 102 MMOL/L (ref 97–108)
CO2 SERPL-SCNC: 26 MMOL/L (ref 21–32)
CREAT SERPL-MCNC: 1.7 MG/DL (ref 0.7–1.3)
ERYTHROCYTE [DISTWIDTH] IN BLOOD BY AUTOMATED COUNT: 18 % (ref 11.5–14.5)
GLUCOSE SERPL-MCNC: 277 MG/DL (ref 65–100)
HCT VFR BLD AUTO: 37.2 % (ref 36.6–50.3)
HGB BLD-MCNC: 10.8 G/DL (ref 12.1–17)
MCH RBC QN AUTO: 23.6 PG (ref 26–34)
MCHC RBC AUTO-ENTMCNC: 29 G/DL (ref 30–36.5)
MCV RBC AUTO: 81.4 FL (ref 80–99)
NRBC # BLD: 0 K/UL (ref 0–0.01)
NRBC BLD-RTO: 0 PER 100 WBC
PLATELET # BLD AUTO: 191 K/UL (ref 150–400)
POTASSIUM SERPL-SCNC: 4.8 MMOL/L (ref 3.5–5.1)
RBC # BLD AUTO: 4.57 M/UL (ref 4.1–5.7)
SODIUM SERPL-SCNC: 132 MMOL/L (ref 136–145)
WBC # BLD AUTO: 6.3 K/UL (ref 4.1–11.1)

## 2022-02-26 LAB
SARS-COV-2, XPLCVT: NOT DETECTED
SOURCE, COVRS: NORMAL

## 2022-02-28 ENCOUNTER — HOSPITAL ENCOUNTER (OUTPATIENT)
Age: 72
Setting detail: OUTPATIENT SURGERY
Discharge: HOME | End: 2022-02-28
Attending: INTERNAL MEDICINE | Admitting: INTERNAL MEDICINE
Payer: MEDICARE

## 2022-02-28 VITALS
SYSTOLIC BLOOD PRESSURE: 166 MMHG | WEIGHT: 145 LBS | HEART RATE: 82 BPM | BODY MASS INDEX: 21.48 KG/M2 | DIASTOLIC BLOOD PRESSURE: 75 MMHG | TEMPERATURE: 97.7 F | HEIGHT: 69 IN | RESPIRATION RATE: 17 BRPM | OXYGEN SATURATION: 99 %

## 2022-02-28 DIAGNOSIS — I20.0 UNSTABLE ANGINA (HCC): ICD-10-CM

## 2022-02-28 LAB
ACT BLD: 267 SECS (ref 79–138)
ACT BLD: 428 SECS (ref 79–138)
GLUCOSE BLD STRIP.AUTO-MCNC: 251 MG/DL (ref 65–117)
SERVICE CMNT-IMP: ABNORMAL

## 2022-02-28 PROCEDURE — 74011000250 HC RX REV CODE- 250: Performed by: MIDWIFE

## 2022-02-28 PROCEDURE — 77030039046 HC PAD DEFIB RADIOTRNSPNT CNMD -B: Performed by: INTERNAL MEDICINE

## 2022-02-28 PROCEDURE — 77030013744: Performed by: INTERNAL MEDICINE

## 2022-02-28 PROCEDURE — 92928 PRQ TCAT PLMT NTRAC ST 1 LES: CPT | Performed by: INTERNAL MEDICINE

## 2022-02-28 PROCEDURE — 99152 MOD SED SAME PHYS/QHP 5/>YRS: CPT | Performed by: INTERNAL MEDICINE

## 2022-02-28 PROCEDURE — 92921 HC PTCA SNGL MAJOR COR VESL/BRNCH  EA ADD LC: CPT | Performed by: INTERNAL MEDICINE

## 2022-02-28 PROCEDURE — C1894 INTRO/SHEATH, NON-LASER: HCPCS | Performed by: INTERNAL MEDICINE

## 2022-02-28 PROCEDURE — 74011000636 HC RX REV CODE- 636: Performed by: INTERNAL MEDICINE

## 2022-02-28 PROCEDURE — 99153 MOD SED SAME PHYS/QHP EA: CPT | Performed by: INTERNAL MEDICINE

## 2022-02-28 PROCEDURE — 93459 L HRT ART/GRFT ANGIO: CPT | Performed by: INTERNAL MEDICINE

## 2022-02-28 PROCEDURE — C1887 CATHETER, GUIDING: HCPCS | Performed by: INTERNAL MEDICINE

## 2022-02-28 PROCEDURE — 74011000250 HC RX REV CODE- 250: Performed by: INTERNAL MEDICINE

## 2022-02-28 PROCEDURE — 74011250636 HC RX REV CODE- 250/636: Performed by: INTERNAL MEDICINE

## 2022-02-28 PROCEDURE — 85347 COAGULATION TIME ACTIVATED: CPT

## 2022-02-28 PROCEDURE — C1769 GUIDE WIRE: HCPCS | Performed by: INTERNAL MEDICINE

## 2022-02-28 PROCEDURE — 74011250637 HC RX REV CODE- 250/637: Performed by: INTERNAL MEDICINE

## 2022-02-28 PROCEDURE — C1874 STENT, COATED/COV W/DEL SYS: HCPCS | Performed by: INTERNAL MEDICINE

## 2022-02-28 PROCEDURE — 77030016699 HC CATH ANGI DX INFN1 CARD -A: Performed by: INTERNAL MEDICINE

## 2022-02-28 PROCEDURE — 77030013715 HC INFL SYS MRTM -B: Performed by: INTERNAL MEDICINE

## 2022-02-28 PROCEDURE — 82962 GLUCOSE BLOOD TEST: CPT

## 2022-02-28 PROCEDURE — 77030029997 HC DEV COM RDL R BND TELE -B: Performed by: INTERNAL MEDICINE

## 2022-02-28 PROCEDURE — C1725 CATH, TRANSLUMIN NON-LASER: HCPCS | Performed by: INTERNAL MEDICINE

## 2022-02-28 PROCEDURE — 77030010221 HC SPLNT WR POS TELE -B: Performed by: INTERNAL MEDICINE

## 2022-02-28 PROCEDURE — 93458 L HRT ARTERY/VENTRICLE ANGIO: CPT | Performed by: INTERNAL MEDICINE

## 2022-02-28 DEVICE — XIENCE SIERRA™ EVEROLIMUS ELUTING CORONARY STENT SYSTEM 2.25 MM X 28 MM / RAPID-EXCHANGE
Type: IMPLANTABLE DEVICE | Status: FUNCTIONAL
Brand: XIENCE SIERRA™

## 2022-02-28 RX ORDER — CLOPIDOGREL 300 MG/1
TABLET, FILM COATED ORAL AS NEEDED
Status: DISCONTINUED | OUTPATIENT
Start: 2022-02-28 | End: 2022-02-28 | Stop reason: HOSPADM

## 2022-02-28 RX ORDER — HEPARIN SODIUM 1000 [USP'U]/ML
INJECTION, SOLUTION INTRAVENOUS; SUBCUTANEOUS AS NEEDED
Status: DISCONTINUED | OUTPATIENT
Start: 2022-02-28 | End: 2022-02-28 | Stop reason: HOSPADM

## 2022-02-28 RX ORDER — CLOPIDOGREL BISULFATE 75 MG/1
75 TABLET ORAL DAILY
Qty: 30 TABLET | Refills: 11 | Status: SHIPPED | OUTPATIENT
Start: 2022-02-28 | End: 2023-02-23

## 2022-02-28 RX ORDER — FENTANYL CITRATE 50 UG/ML
INJECTION, SOLUTION INTRAMUSCULAR; INTRAVENOUS AS NEEDED
Status: DISCONTINUED | OUTPATIENT
Start: 2022-02-28 | End: 2022-02-28 | Stop reason: HOSPADM

## 2022-02-28 RX ORDER — SODIUM CHLORIDE 9 MG/ML
25 INJECTION, SOLUTION INTRAVENOUS CONTINUOUS
Status: DISCONTINUED | OUTPATIENT
Start: 2022-02-28 | End: 2022-02-28 | Stop reason: HOSPADM

## 2022-02-28 RX ORDER — HEPARIN SODIUM 200 [USP'U]/100ML
INJECTION, SOLUTION INTRAVENOUS
Status: COMPLETED | OUTPATIENT
Start: 2022-02-28 | End: 2022-02-28

## 2022-02-28 RX ORDER — LIDOCAINE HYDROCHLORIDE 10 MG/ML
INJECTION INFILTRATION; PERINEURAL AS NEEDED
Status: DISCONTINUED | OUTPATIENT
Start: 2022-02-28 | End: 2022-02-28 | Stop reason: HOSPADM

## 2022-02-28 RX ORDER — ACETAMINOPHEN 325 MG/1
650 TABLET ORAL
Status: DISCONTINUED | OUTPATIENT
Start: 2022-02-28 | End: 2022-02-28 | Stop reason: HOSPADM

## 2022-02-28 RX ORDER — ROSUVASTATIN CALCIUM 20 MG/1
20 TABLET, COATED ORAL
Qty: 90 TABLET | Refills: 3 | Status: SHIPPED | OUTPATIENT
Start: 2022-02-28

## 2022-02-28 RX ORDER — MIDAZOLAM HYDROCHLORIDE 1 MG/ML
INJECTION, SOLUTION INTRAMUSCULAR; INTRAVENOUS AS NEEDED
Status: DISCONTINUED | OUTPATIENT
Start: 2022-02-28 | End: 2022-02-28 | Stop reason: HOSPADM

## 2022-02-28 RX ADMIN — SODIUM CHLORIDE 25 ML/HR: 9 INJECTION, SOLUTION INTRAVENOUS at 12:35

## 2022-02-28 NOTE — Clinical Note
Balloon catheter removed. Complex Repair And Tissue Cultured Epidermal Autograft Text: The defect edges were debeveled with a #15 scalpel blade.  The primary defect was closed partially with a complex linear closure.  Given the location of the defect, shape of the defect and the proximity to free margins an tissue cultured epidermal autograft was deemed most appropriate to repair the remaining defect.  The graft was trimmed to fit the size of the remaining defect.  The graft was then placed in the primary defect, oriented appropriately, and sutured into place.

## 2022-02-28 NOTE — DISCHARGE INSTRUCTIONS
Radial Cardiac Catheterization / Angiography Discharge Instructions    It is normal to feel tired the first couple days. Take it easy and follow the physicians instructions. CHECK THE CATHETER INSERTION SITE DAILY:  Remove the wrist dressing 24 hours after the procedure. You may shower 24 hours after the procedure. Wash with soap and water and pat dry. Gentle cleaning of the site with soap and water is sufficient, cover with a dry clean dressing or bandage. Do not apply creams or powders to the area. No soaking the wrist for 3 days. Leave the puncture site open to air after 24 hours post-procedure. CALL THE PHYSICIAN:  If the site becomes red, swollen or feels warm to the touch. If there is bleeding or drainage or if there is unusual pain at the radial site. If there is any minor oozing, you may apply a band-aid and remove after 12 hours. If the bleeding continues, hold pressure with the middle finger against the puncture site and the thumb against the back of the wrist, call 911 to be transported to the hospital.  DO NOT DRIVE YOURSELF, [facility]. ACTIVITY:  For the first 24 hours do not manipulate the wrist.  No lifting, pushing or pulling over 3-5 pounds with the affected wrist for 7 days and no straining the insertion site. Do not lift grocery bags or the garbage can, do not run the vacuum  or  for 7 days. Start with short walks as in the hospital and gradually increase as tolerated each day. It is recommended to walk 30 minutes 5-7 days per week. Follow your physicians instructions on activity. Avoid walking outside in extremes of heat or cold. Walk inside when it is cold and windy or hot and humid. THINGS TO KEEP IN MIND:  No driving for at least 24 hours, or as designated by your physician. Limit the number of times you go up and down the stairs  Take rests and pace yourself with activity.   Be careful and do not strain with bowel movements. MEDICATIONS:  Take all medications as prescribed. Call your physician if you have any questions. Keep an updated list of your medications with you at all times and give a list to your physician and pharmacist.    SIGNS AND SYMPTOMS:  Be cautions of symptoms of angina or recurrent symptoms such as chest discomfort, unusual shortness of breath or fatigue. These could be symptoms of restenosis, a new blockage or a heart attack. If your symptoms are relieved with rest it is still recommended that you notify your physician of recurrent chest pain or discomfort. For CHEST PAIN or symptoms of angina not relieved with rest:  If the discomfort is not relieved with rest and you have been prescribed Nitroglycerin, take as directed (taken under the tongue, one at a time 5 minutes apart for a total of 3 doses). If the discomfort is not relieved after the 3rd nitroglycerin, call 911. AFTER CARE:  Follow up with you physician as instructed. Follow a heart healthy diet with proper portion control, daily stress management, daily      exercise, blood pressure and cholesterol control, and smoking cessation.

## 2022-02-28 NOTE — PROGRESS NOTES
Cardiac Cath Lab Procedure Area Arrival Note:    Clulen Jarvis arrived to Cardiac Cath Lab, Procedure Area. Patient identifiers verified with NAME and DATE OF BIRTH. Procedure verified with patient. Consent forms verified. Allergies verified. Patient informed of procedure and plan of care. Questions answered with review. Patient voiced understanding of procedure and plan of care. Patient on cardiac monitor, non-invasive blood pressure, SPO2 monitor. On RA . IV of NS on pump at 75 ml/hr. Patient status doing well without problems. Patient is A&Ox 4. Patient reports no chest pain. Patient medicated during procedure with orders obtained and verified by Dr. Xu Hall. Refer to patients Cardiac Cath Lab PROCEDURE REPORT for vital signs, assessment, status, and response during procedure, printed at end of case. Printed report on chart or scanned into chart.

## 2022-02-28 NOTE — H&P
Dr. Peña Edwards. [phone]            Cardiology Consult/Progress Note      Requesting/referring provider: Betty Lenz MD     Reason for Consult:   Angina with hx of CABG. Assessment/Plan:    1. Coronary artery disease involving native coronary artery of native heart without angina pectoris but exertional intolerance and shortness of breath    2. Essential hypertension    3. Hyperlipidemia- on statin,. Last LDL 78.8 in Sept 2021.     4. DM - followed by PCP.  is seen at the request of Dr Prakash Zamora.  He has history of coronary artery disease, with preserved LV function, hypertension, diabetes mellitus and has been reporting increasing tiredness and shortness of breath over the past few months. I reviewed his last cardiac catheterization. His symptoms occurred at rather limited activity and I am uncertain if his CAD alone could be a cause of his symptoms and given that it is limited to only circumflex territory in both RCA and LAD grafts are patent however despite adequate. His aortic stenosis does not appear to be severe enough at this point of time to cause his symptoms. He is already on significant antianginal therapy    I have offered the patient an option to consider PCI of his distal left main and circumflex but with the caveat that he may not notice normalization of symptoms since symptoms could be potentially from other unexplained causes beyond CAD. At the same time since all other options have not worked so far it would be reasonable to consider PCI just in case it helps with any kind of symptom improvement. Blood pressure treatment will also be continued. His heart rate is much improved compared to the last visit. He is on adequate statin therapy with last LDL of 78 mg/dL. Patient will make a final decision and will let us know whether he like to proceed or not.     I discussed the risks/benefits/alternatives of the procedure with the patient. Risks include (but are not limited to) bleeding, infection,stroke,heart attack, need for emergency surgery/pericardiocentesis, need for dialysis or death. The patient understands and agrees to proceed. Investigations ordered: She will require cardiac catheterization, he will require a BMP and CBC in addition. []    High complexity decision making was performed  []    Patient is at high-risk of decompensation with multiple organ involvement  []    Complex/difficult social determinants of health outcomes  Total of ** minutes were spent on reviewing the records, analyzing investigations and documentation in the chart, on the day of visit including time for examination and time spent with the patient  Investigations personally reviewed and interpreted  Cardiac catheterization September 2021: Reviewed above. EKG 02/28/22      Sinus  Rhythm   -ST depression   +   Nonspecific T-abnormality  -Nondiagnostic -possible digitalis effect, -consider subendocardial injury/ischemia. Ventricular rate 89    03/15/21    NUCLEAR CARDIAC STRESS TEST 03/18/2021 3/19/2021    Interpretation Summary  · SPECT: Left ventricular function post-stress was normal. Calculated ejection fraction is 64%. There is no evidence of transient ischemic dilation (TID). The TID ratio is 1.07.  · Baseline ECG: Normal sinus rhythm, non-specific ST-T wave abnormalities. · SPECT: Myocardial perfusion imaging defect 1: There is a defect that is medium in size present in the apical and inferolateral location(s) that is reversible. The defect appears to be ischemia. Perfusion defect was visually and quantitatively present. Myocardial perfusion imaging defect 2: There is a defect that is medium in size affecting the mid and inferolateral location(s) that is non-reversible. The defect appears to be infarction.   · SPECT: Left ventricular perfusion is abnormal. an intermediate risk stress test.    Signed by: Idalia Matos MD on 3/18/2021 11:12 AM   03/15/21    ECHO ADULT COMPLETE 03/27/2021 3/27/2021    Interpretation Summary  · LV: Estimated LVEF is 55 - 60%. Biplane method used to measure ejection fraction. Normal cavity size and systolic function (ejection fraction normal). Mild concentric hypertrophy. Wall motion: normal. Mild (grade 1) left ventricular diastolic dysfunction. · AV: Aortic valve leaflet calcification present. Mild aortic valve regurgitation is present. · MV: Mitral valve thickening. Mitral valve leaflet calcification. Moderate mitral annular calcification. Mild mitral valve regurgitation is present. · TV: Mild tricuspid valve regurgitation is present. · PV: Mild pulmonic valve regurgitation is present. · LA: Mildly dilated left atrium. · PA: Pulmonary arterial systolic pressure is 33 mmHg. Signed by: Margaret Craven MD on 3/27/2021  7:40 PM      Investigations reviewed    [facility] 9/8/21-    R CFA access - 6 F sheath     L Main: Med to Large; Distal 60-70%      LAD: Ostial 70%; Mid 90%; Competitive flow from LIMA to LAD; Small D1/D2     LCflex: Ostial/prox 80%; Med; OM1 - small - prox diffuse dz/OM2 - small to med/OM3 - small - aneurysmal segment distal vessel - ? Bypassed vessel . NICOLE 3 flow in distal LCflex and OM     RCA:  Med; Ostial/Prox 90%    LIMA to LAD - very tortuous; patent; Native vessel patent     SVG to RCA  ( RCB catheter)- patent; Nativel vessel small patent; backfilling of native RCA noted     SVG to OM - multiple catheters used - [id number] - unable to visualize; Aortogram performed - not visualised - prob TO. Also no competitive flow noted in OM on injecting native LCA     LVEDP: 8 mm Hg     LVEF: Not assessed     No significant gradient across aortic valve.    Serge Garcia, a 80y.o. year-old who is seen for evaluation of angina with hx of CAD and CABG   He  has a past medical history of CAD (coronary artery disease) (11/10/2016), Deafness (10/28/2012), DM (diabetes mellitus) ([facility] [address] Elevated cholesterol, Hypertension, and NSTEMI (non-ST elevated myocardial infarction) ([facility] [address] (11/10/2016). Review of system:Patient reports no PND/Orthpnea/CP. He reports no cough/fever/focal neurological deficits/abdominal pain. All other systems negative except as above. Family History   Problem Relation Age of Onset    Heart Disease Father     Heart Attack Father     Hypertension Mother    [facility] Elevated Lipids Brother     Elevated Lipids Brother     No Known Problems Sister     Elevated Lipids Brother     No Known Problems Son     No Known Problems Daughter     Anesth Problems Neg Hx       Social History     Socioeconomic History    Marital status:    Tobacco Use    Smoking status: Never Smoker    Smokeless tobacco: Never Used   Vaping Use    Vaping Use: Never used   Substance and Sexual Activity    Alcohol use: Yes     Alcohol/week: 2.0 standard drinks     Types: 1 Cans of beer, 1 Shots of liquor per week     Comment: 1 DRINK DAILY    Drug use: No    Sexual activity: Yes      PE  Vitals:    02/28/22 1205 02/28/22 1230   BP: (!) 187/100 (!) 178/93   Resp: 22 19   Temp: 97.7 °F (36.5 °C)    SpO2: 100% 96%   Weight: 145 lb (65.8 kg)    Height: 5' 9\" (1.753 m)     Body mass index is 21.41 kg/m². General:    Alert, cooperative, no distress. Psychiatric:    Normal Mood and affect    Eye/ENT:      Pupils equal, No asymmetry, Conjunctival pink. Able to hear voice at normal amplitude   Lungs:      Visibly symmetric chest expansion, No palpable tenderness. Clear to auscultation bilaterally. Heart[de-identified]    Regular rate and rhythm, S1, S2 normal, no murmur, click, rub or gallop. No JVD, Normal palpable peripheral pulses. No cyanosis   Abdomen:     Soft, non-tender. Bowel sounds normal. No masses,  No      organomegaly. Extremities:   Extremities normal, atraumatic, no edema. Neurologic:   CN II-XII grossly intact.  No gross focal deficits           Recent Labs:  Lab Results   Component Value Date/Time    Cholesterol, total 173 02/03/2022 11:47 AM    HDL Cholesterol 47 02/03/2022 11:47 AM    LDL, calculated 77.8 02/03/2022 11:47 AM    Triglyceride 241 (H) 02/03/2022 11:47 AM    CHOL/HDL Ratio 3.7 02/03/2022 11:47 AM     Lab Results   Component Value Date/Time    Creatinine 1.70 (H) 02/24/2022 10:45 AM     Lab Results   Component Value Date/Time    BUN 26 (H) 02/24/2022 10:45 AM     Lab Results   Component Value Date/Time    Potassium 4.8 02/24/2022 10:45 AM     Lab Results   Component Value Date/Time    Hemoglobin A1c 8.8 (H) 02/03/2022 11:47 AM     Lab Results   Component Value Date/Time    HGB 10.8 (L) 02/24/2022 10:45 AM     Lab Results   Component Value Date/Time    PLATELET 263 79/48/5525 10:45 AM       Reviewed:  Past Medical History:   Diagnosis Date    CAD (coronary artery disease) 11/10/2016    NSTEMI & 2 stents    Deafness 10/28/2012    DM (diabetes mellitus) ([facility] [address]     Elevated cholesterol     Hypertension     NSTEMI (non-ST elevated myocardial infarction) ([facility] [address] 11/10/2016     Social History     Tobacco Use   Smoking Status Never Smoker   Smokeless Tobacco Never Used     Social History     Substance and Sexual Activity   Alcohol Use Yes    Alcohol/week: 2.0 standard drinks    Types: 1 Cans of beer, 1 Shots of liquor per week    Comment: 1 DRINK DAILY     No Known Allergies  Family History   Problem Relation Age of Onset    Heart Disease Father     Heart Attack Father     Hypertension Mother    [place] Elevated Lipids Brother     Elevated Lipids Brother     No Known Problems Sister     Elevated Lipids Brother     No Known Problems Son     No Known Problems Daughter     Anesth Problems Neg Hx         Current Facility-Administered Medications   Medication Dose Route Frequency    0.9% sodium chloride infusion  25 mL/hr IntraVENous CONTINUOUS    acetaminophen (TYLENOL) tablet 650 mg  650 mg Oral Q4H PRN       Claudell Goldsmith, MD    ATTENTION:   This medical record was transcribed using an electronic medical records/speech recognition system. Although proofread, it may and can contain electronic, spelling and other errors. Corrections may be executed at a later time. Please feel free to contact us for any clarifications as needed.     [facility] [place]  [facility], [place], [place]. [phone]

## 2022-02-28 NOTE — PROCEDURES
Summary: Successful PCI of distal left main, proximal circumflex, proximal OM2    Findings  1. Severe native multivessel coronary disease  2. Patent LIMA to LAD, vein graft to distal RCA with severe disease in the native vessels  3. Occluded vein graft to OM 2. Native OM 2 severely diseased along with proximal to mid circumflex as well as distal left main. Overall the vessel is significantly remodeled negatively. Additionally there is significant disease in the first marginal branch which is even smaller as well as AV groove branch right at the takeoff of OM2. Single stent 2.25 x 28 mm Xience was placed extending from the OM 2 into the circumflex into the distal left main and sequentially dilated with 2.25 noncompliant, 2 5 noncompliant, 3mm noncompliant and 3 5 noncompliant to perform multilevel POT to manage multiple bifurcations on the way. Distal left main which was stented was optimized with 3.5 x 6 mm noncompliant balloon. Atherectomy was considered but given small size of the vessels, was not deemed to be absolutely safe for the obtuse marginal lesion. Overall stent expanded fairly well related to the size of the vessels. 4.  Normal LVEDP    Access right radial: Small vessel, tortuous} brachiocephalic  Contrast 65 cc    Recommendations  1. Plavix based dual antiplatelet therapy  2.   Guideline directed medical therapy for secondary prevention of coronary events

## 2022-02-28 NOTE — PROGRESS NOTES
TRANSFER - IN REPORT:    Verbal report received from [facility] on Gayathri Kyle  being received from cath lab procedure for routine progression of care. Report consisted of patients Situation, Background, Assessment and Recommendations(SBAR). Information from the following report(s) SBAR, Procedure Summary and MAR was reviewed with the receiving clinician. Opportunity for questions and clarification was provided. Assessment completed upon patients arrival to [address] [place] [address] and care assumed. Cardiac Cath Lab Recovery Arrival Note:    Gayathri Kyle arrived to [facility] recovery area. Patient procedure= LHC/PCI. Patient on cardiac monitor, non-invasive blood pressure, SPO2 monitor. On room air. IV  of Normal saline on pump at 100 ml/hr. Patient status doing well without problems. Patient is A&Ox 4. Patient reports no pain. PROCEDURE SITE CHECK:    Procedure site:without any bleeding and no hematoma, no pain/discomfort reported at procedure site. No change in patient status. Continue to monitor patient and status. 1730- all air removed from the vasc band. Patient ambulated to the bathroom and started to bleed from right radial access site. 5cc air placed back into band. Bleeding controlled. Will continue to monitor. 1745- Air started to be let out of band. No bleeding noted. 1800- All air out of band will watch radial access site for 15 minutes before dressing   1815- Right radial access area dressing with a quick clot and transparent dressing. No bleeding or hematoma noted. I have reviewed discharge instructions with the patient and spouse. The patient and spouse verbalized understanding.

## 2022-02-28 NOTE — PROGRESS NOTES
Transfer to [address] [place] [address] from Procedure Area    Verbal report given to Diallo Matthews on Gayathri Kyle being transferred to Cardiac Cath Lab  for routine progression of care   Patient is post PCI w/ JEREMY procedure. Patient stable upon transfer to . Report consisted of patients Situation, Background, Assessment and   Recommendations(SBAR). Information from the following report(s) Procedure Summary, MAR and Recent Results was reviewed with the receiving nurse. Opportunity for questions and clarification was provided. Patient medicated during procedure with orders obtained and verified by Dr. Fausto Finn. Refer to patient PROCEDURE REPORT for vital signs, assessment, status, and response during procedure.

## 2022-02-28 NOTE — PROGRESS NOTES
Cardiac Cath Lab Recovery Arrival Note:      Aldo Echeverria arrived to Cardiac Cath Lab, Recovery Area. Staff introduced to patient. Patient identifiers verified with NAME and DATE OF BIRTH. Procedure verified with patient. Consent forms reviewed and signed by patient or authorized representative and verified. Allergies verified. Patient and family oriented to department. Patient and family informed of procedure and plan of care. Questions answered with review. Patient prepped for procedure, per orders from physician, prior to arrival.    Patient on cardiac monitor, non-invasive blood pressure, SPO2 monitor. On room air. Patient is A&Ox 4. Patient reports no pain. Patient in stretcher, in low position, with side rails up, call bell within reach, patient instructed to call if assistance as needed. Patient prep in: [address], [address] Patient family has pager # [phone]  Family in: [phone].    Prep by: RENETTA

## 2022-03-01 ENCOUNTER — TRANSCRIBE ORDER (OUTPATIENT)
Dept: CARDIAC REHAB | Age: 72
End: 2022-03-01

## 2022-03-01 DIAGNOSIS — Z95.5 STENTED CORONARY ARTERY: Primary | ICD-10-CM

## 2022-03-18 PROBLEM — I25.10 CORONARY ARTERY DISEASE INVOLVING NATIVE CORONARY ARTERY OF NATIVE HEART WITHOUT ANGINA PECTORIS: Status: ACTIVE | Noted: 2017-07-30

## 2022-03-19 PROBLEM — Z12.11 COLON CANCER SCREENING: Status: ACTIVE | Noted: 2017-05-30

## 2022-03-19 PROBLEM — E78.00 HYPERCHOLESTEREMIA: Status: ACTIVE | Noted: 2017-07-30

## 2022-03-19 PROBLEM — E11.22 CKD STAGE 3 DUE TO TYPE 2 DIABETES MELLITUS (HCC): Status: ACTIVE | Noted: 2021-01-21

## 2022-03-19 PROBLEM — N18.30 CKD STAGE 3 DUE TO TYPE 2 DIABETES MELLITUS (HCC): Status: ACTIVE | Noted: 2021-01-21

## 2022-03-19 PROBLEM — I10 HYPERTENSION, ESSENTIAL: Status: ACTIVE | Noted: 2017-07-30

## 2022-03-20 PROBLEM — Z95.1 S/P CABG X 3: Status: ACTIVE | Noted: 2018-07-09

## 2022-03-28 DIAGNOSIS — Z95.1 S/P CABG X 3: ICD-10-CM

## 2022-03-28 DIAGNOSIS — R06.09 DOE (DYSPNEA ON EXERTION): ICD-10-CM

## 2022-03-28 DIAGNOSIS — E87.5 HYPERKALEMIA: ICD-10-CM

## 2022-03-28 DIAGNOSIS — I25.10 CORONARY ARTERY DISEASE INVOLVING NATIVE CORONARY ARTERY OF NATIVE HEART WITHOUT ANGINA PECTORIS: ICD-10-CM

## 2022-03-28 RX ORDER — FUROSEMIDE 20 MG/1
TABLET ORAL
Qty: 90 TABLET | Refills: 0 | Status: SHIPPED | OUTPATIENT
Start: 2022-03-28 | End: 2022-06-26

## 2022-03-28 RX ORDER — ISOSORBIDE MONONITRATE 30 MG/1
TABLET, EXTENDED RELEASE ORAL
Qty: 90 TABLET | Refills: 0 | Status: SHIPPED | OUTPATIENT
Start: 2022-03-28

## 2022-03-28 RX ORDER — CARVEDILOL 6.25 MG/1
6.25 TABLET ORAL 2 TIMES DAILY WITH MEALS
Qty: 180 TABLET | Refills: 3 | Status: SHIPPED | OUTPATIENT
Start: 2022-03-28 | End: 2022-06-07

## 2022-03-28 RX ORDER — METFORMIN HYDROCHLORIDE 1000 MG/1
TABLET ORAL
Qty: 180 TABLET | Refills: 0 | Status: SHIPPED | OUTPATIENT
Start: 2022-03-28 | End: 2022-06-26

## 2022-03-28 NOTE — TELEPHONE ENCOUNTER
Peranna SHEN by MD.    Future Appointments   Date Time Provider [facility][1] [place]   5/23/2022  3:00 PM MD TEZ Vega BS AMB   6/23/2022  9:20 AM Qiana Smtih MD PAFP BS AMB

## 2022-04-01 DIAGNOSIS — Z95.5 STENTED CORONARY ARTERY: ICD-10-CM

## 2022-04-06 RX ORDER — SITAGLIPTIN 50 MG/1
TABLET, FILM COATED ORAL
Qty: 90 TABLET | Refills: 0 | Status: SHIPPED | OUTPATIENT
Start: 2022-04-06 | End: 2022-06-26

## 2022-05-18 ENCOUNTER — OFFICE VISIT (OUTPATIENT)
Dept: CARDIOLOGY CLINIC | Age: 72
End: 2022-05-18
Payer: MEDICAID

## 2022-05-18 VITALS
BODY MASS INDEX: 20.59 KG/M2 | HEART RATE: 84 BPM | OXYGEN SATURATION: 99 % | DIASTOLIC BLOOD PRESSURE: 62 MMHG | SYSTOLIC BLOOD PRESSURE: 120 MMHG | RESPIRATION RATE: 16 BRPM | HEIGHT: 69 IN | WEIGHT: 139 LBS

## 2022-05-18 DIAGNOSIS — Z95.1 S/P CABG X 3: ICD-10-CM

## 2022-05-18 DIAGNOSIS — I10 HYPERTENSION, ESSENTIAL: ICD-10-CM

## 2022-05-18 DIAGNOSIS — I35.8 NON-RHEUMATIC AORTIC SCLEROSIS: ICD-10-CM

## 2022-05-18 DIAGNOSIS — E78.5 DYSLIPIDEMIA: ICD-10-CM

## 2022-05-18 DIAGNOSIS — Z78.9 STATIN INTOLERANCE: ICD-10-CM

## 2022-05-18 DIAGNOSIS — I25.10 CORONARY ARTERY DISEASE INVOLVING NATIVE CORONARY ARTERY OF NATIVE HEART WITHOUT ANGINA PECTORIS: Primary | ICD-10-CM

## 2022-05-18 PROCEDURE — 99213 OFFICE O/P EST LOW 20 MIN: CPT | Performed by: SPECIALIST

## 2022-05-18 PROCEDURE — 1123F ACP DISCUSS/DSCN MKR DOCD: CPT | Performed by: SPECIALIST

## 2022-05-18 NOTE — Clinical Note
5/18/2022    Patient: Ham Grande   YOB: 1950   Date of Visit: 5/18/2022     Lizzie [place] MD  [address] [address]  Via In Basket    Dear Lizzie [place] MD,      Thank you for referring Mr. Ham Grande to [address] for evaluation. My notes for this consultation are attached. If you have questions, please do not hesitate to call me. I look forward to following your patient along with you.       Sincerely,    Cynthia Miller MD

## 2022-05-18 NOTE — PROGRESS NOTES
Neal Kendall     1950       Monik Sanchez MD, [facility]  Date of Visit-5/18/2022   PCP is Kiya Levin MD   [facility]  [facility]  HPI:  Robb Najjar is a 80 y.o. male   9 month f/u from [facility]. · CAD with CABG 6/9/2018. · NSTEMI in November 2016 and resolved pulmonary HTN.    · Stent to circumflex an LAD in 2016.   · Stress echo in 2018 with a drop in BP with exercise and a drop in EF.   · Cath on 6/22/18 that showed even with a 5F catheter, he dampened with suspected ostial 3 vessel disease.   · Echo 3/27/2021 = EF 55 to 60% mild AR MR TR LAE MAC  · Nuclear 3/18/2021 EF 64% medium size defect apical and inferior lateral reversible ischemia medium defect mid and inferolateral nonreversible appear to be infarction intermediate risk stress test         Today. .. Pt had procedure with PCI 2/28/22.  02/28/22    CARDIAC PROCEDURE 02/28/2022 2/28/2022    Conclusion  Findings  1. Severe native multivessel coronary disease  2. Patent LIMA to LAD, vein graft to distal RCA with severe disease in the native vessels  3. Occluded vein graft to OM 2. Native OM 2 severely diseased along with proximal to mid circumflex as well as distal left main. Overall the vessel is significantly remodeled negatively. Additionally there is significant disease in the first marginal branch which is even smaller as well as AV groove branch right at the takeoff of OM2. Single stent 2.25 x 28 mm Xience was placed extending from the OM 2 into the circumflex into the distal left main and sequentially dilated with 2.25 noncompliant, 2 5 noncompliant, 3 oh noncompliant and 3 5 noncompliant to perform multilevel POT to manage multiple bifurcations on the way. Atherectomy was considered but given small size of the vessels, was not deemed to be absolutely safe for the obtuse marginal lesion. Overall stent expanded fairly well related to the size of the vessels.   4.  Normal LVEDP    Access right radial: Small vessel, tortuous} brachiocephalic  Contrast 65 cc    Recommendations  1. Plavix based dual antiplatelet therapy  2. Guideline directed medical therapy for secondary prevention of coronary events    Signed by: Ted Mendez MD on 2/28/2022  4:22 PM      Overall the pt states he is doing well. Denies chest pain, edema, syncope or shortness of breath at rest, has no tachycardia, palpitations or sense of arrhythmia. Assessment/Plan:     1. Coronary artery disease involving native coronary artery of native heart without angina pectoris  Angina has resolved with latest stenting to OM. He seems to feel substantially better with his SOB. Will continue with med therapy and f/u in 6 months. CABG 2018. cath 9/8/21 open LIMA. Continue Coreg. 2. Hypertension, essential  At goal , meds and possible side effects reviewed and patient denies  Key CAD CHF Meds             carvediloL (COREG) 6.25 mg tablet (Taking) Take 1 Tablet by mouth two (2) times daily (with meals). isosorbide mononitrate ER (IMDUR) 30 mg tablet (Taking) Take 1 tablet by mouth once daily    furosemide (LASIX) 20 mg tablet (Taking) Take 1 tablet by mouth once daily    clopidogreL (Plavix) 75 mg tab (Taking) Take 1 Tablet by mouth daily for 360 days. Indications: a sudden worsening of angina called acute coronary syndrome    rosuvastatin (CRESTOR) 20 mg tablet (Taking) Take 1 Tablet by mouth nightly. hydroCHLOROthiazide (HYDRODIURIL) 25 mg tablet (Taking) Take 1 tablet by mouth once daily    aspirin delayed-release 81 mg tablet (Taking) Take 1 Tab by mouth. BP Readings from Last 6 Encounters:   05/18/22 120/62   02/28/22 (!) 166/75   02/03/22 104/63   01/19/22 (!) 140/80   11/22/21 130/80   09/23/21 (!) 177/65           3.  Dyslipidemia  At goal , denies excess muscle aches or new liver issues  Key Antihyperlipidemia Meds             rosuvastatin (CRESTOR) 20 mg tablet (Taking) Take 1 Tablet by mouth nightly. Lab Results   Component Value Date/Time    LDL, calculated 77.8 02/03/2022 11:47 AM          4. Non-rheumatic aortic sclerosis  03/15/21    ECHO ADULT COMPLETE 03/27/2021 3/27/2021    Interpretation Summary  · LV: Estimated LVEF is 55 - 60%. Biplane method used to measure ejection fraction. Normal cavity size and systolic function (ejection fraction normal). Mild concentric hypertrophy. Wall motion: normal. Mild (grade 1) left ventricular diastolic dysfunction. · AV: Aortic valve leaflet calcification present. Mild aortic valve regurgitation is present. · MV: Mitral valve thickening. Mitral valve leaflet calcification. Moderate mitral annular calcification. Mild mitral valve regurgitation is present. · TV: Mild tricuspid valve regurgitation is present. · PV: Mild pulmonic valve regurgitation is present. · LA: Mildly dilated left atrium. · PA: Pulmonary arterial systolic pressure is 33 mmHg. Signed by: Kell Joel MD on 3/27/2021  7:40 PM  5. S/P CABG x 3  6. Statin intolerance     Impression:   1. Coronary artery disease involving native coronary artery of native heart without angina pectoris    2. Hypertension, essential    3. Dyslipidemia    4. Non-rheumatic aortic sclerosis    5. S/P CABG x 3    6. Statin intolerance       Cardiac History:   Echo 1-15-18 EF 63% 63 %. Mild LAE, MAC, mild AS mean 8, HUY 1.8 mild TR  CABG x 3 7-9-18= LIMA to LAD, SVG-OM, SVG-dRCA   Echo 6-22-18 EF 55-60%, mod MAC, aortic sclerosis without stenosis , mild TR  LISA 6-18-18 4 minutes +moderate hypokinesis of the mid anteroseptal, entire inferior, apical septal, and apical wall(s). With exercise a mild reduction in LV function.   PCI 11-11-16 JEREMY to mLAD 95% JEREMY to OM1 90%     Future Appointments   Date Time Provider Mina Sandy   6/23/2022  9:20 AM Jack Ormond, MD PAFP BS AMB   11/21/2022  3:40 PM MD TEZ Jacobo BS AMB        Patient Care Team:  Jack Ormond, MD as PCP - General (Family Medicine)  Michel Mccartney MD as PCP - [facility]  Sheria Gottron, MD (Cardiovascular Disease Physician)  Naga Domínguez MD (Gastroenterology)  Martinez Sim MD (Cardiothoracic Surgery)  Marija Xiao MD (Cardiovascular Disease Physician)    ROS-except as noted above. . A complete cardiac and respiratory are reviewed and negative except as above ; Resp-denies wheezing  or productive cough,. Const- No unusual weight loss or fever; Neuro-no recent seizure or CVA ; GI- No BRBPR, abdom pain, bloating ; - no  hematuria   see supplement sheet, initialed and to be scanned by staff  Past Medical History:   Diagnosis Date    CAD (coronary artery disease) 11/10/2016    NSTEMI & 2 stents    Deafness 10/28/2012    DM (diabetes mellitus) ([facility] [address]     Elevated cholesterol     Hypertension     NSTEMI (non-ST elevated myocardial infarction) ([facility] [address] 11/10/2016      Social Hx= reports that he has never smoked. He has never used smokeless tobacco. He reports current alcohol use of about 2.0 standard drinks of alcohol per week. He reports that he does not use drugs. Exam and Labs:  /62 (BP 1 Location: Left upper arm, BP Patient Position: Sitting, BP Cuff Size: Adult)   Pulse 84   Resp 16   Ht 5' 9\" (1.753 m)   Wt 139 lb (63 kg)   SpO2 99%   BMI 20.53 kg/m² Constitutional:  NAD, comfortable  Head: NC,AT. Eyes: No scleral icterus. Neck:  Neck supple. No JVD present. Throat: moist mucous membranes. Chest: Effort normal & normal respiratory excursion . Neurological: alert, conversant and oriented . Skin: Skin is not cold. No obvious systemic rash noted. Not diaphoretic. No erythema. Psychiatric:  Grossly normal mood and affect. Behavior appears normal. Extremities:  no clubbing or cyanosis. Abdomen: non distended    Lungs:breath sounds normal. No stridor. distress, wheezes or  Rales.   Heart:2/6 murmur unchanged normal rate, regular rhythm, normal S1, S2, no murmurs, rubs, clicks or gallops , PMI non displaced. Edema: Edema is none. Lab Results   Component Value Date/Time    Cholesterol, total 173 02/03/2022 11:47 AM    HDL Cholesterol 47 02/03/2022 11:47 AM    LDL, calculated 77.8 02/03/2022 11:47 AM    Triglyceride 241 (H) 02/03/2022 11:47 AM    CHOL/HDL Ratio 3.7 02/03/2022 11:47 AM     Lab Results   Component Value Date/Time    Sodium 132 (L) 02/24/2022 10:45 AM    Potassium 4.8 02/24/2022 10:45 AM    Chloride 102 02/24/2022 10:45 AM    CO2 26 02/24/2022 10:45 AM    Anion gap 4 (L) 02/24/2022 10:45 AM    Glucose 277 (H) 02/24/2022 10:45 AM    BUN 26 (H) 02/24/2022 10:45 AM    Creatinine 1.70 (H) 02/24/2022 10:45 AM    BUN/Creatinine ratio 15 02/24/2022 10:45 AM    GFR est AA 49 (L) 02/24/2022 10:45 AM    GFR est non-AA 40 (L) 02/24/2022 10:45 AM    Calcium 9.4 02/24/2022 10:45 AM      Wt Readings from Last 3 Encounters:   05/18/22 139 lb (63 kg)   02/28/22 145 lb (65.8 kg)   02/03/22 137 lb 6.4 oz (62.3 kg)      BP Readings from Last 3 Encounters:   05/18/22 120/62   02/28/22 (!) 166/75   02/03/22 104/63      Current Outpatient Medications   Medication Sig    Januvia 50 mg tablet Take 1 tablet by mouth once daily    Jardiance 10 mg tablet Take 1 tablet by mouth once daily    carvediloL (COREG) 6.25 mg tablet Take 1 Tablet by mouth two (2) times daily (with meals).  isosorbide mononitrate ER (IMDUR) 30 mg tablet Take 1 tablet by mouth once daily    furosemide (LASIX) 20 mg tablet Take 1 tablet by mouth once daily    metFORMIN (GLUCOPHAGE) 1,000 mg tablet TAKE 1 TABLET BY MOUTH TWICE DAILY WITH MEALS    clopidogreL (Plavix) 75 mg tab Take 1 Tablet by mouth daily for 360 days. Indications: a sudden worsening of angina called acute coronary syndrome    rosuvastatin (CRESTOR) 20 mg tablet Take 1 Tablet by mouth nightly.  tamsulosin (FLOMAX) 0.4 mg capsule Take 1 Capsule by mouth daily.     glipiZIDE (GLUCOTROL) 5 mg tablet Take 1 tablet by mouth twice daily    hydroCHLOROthiazide (HYDRODIURIL) 25 mg tablet Take 1 tablet by mouth once daily    aspirin delayed-release 81 mg tablet Take 1 Tab by mouth. No current facility-administered medications for this visit. Impression see above.       Written by Wendelin Hashimoto, as dictated by Sandra Kelley MD.

## 2022-05-26 DIAGNOSIS — N40.1 BPH ASSOCIATED WITH NOCTURIA: ICD-10-CM

## 2022-05-26 DIAGNOSIS — R35.1 BPH ASSOCIATED WITH NOCTURIA: ICD-10-CM

## 2022-05-26 RX ORDER — ALBENDAZOLE 200 MG/1
TABLET, FILM COATED ORAL
Qty: 60 TABLET | Refills: 0 | Status: SHIPPED | OUTPATIENT
Start: 2022-05-26

## 2022-05-26 RX ORDER — TAMSULOSIN HYDROCHLORIDE 0.4 MG/1
CAPSULE ORAL
Qty: 60 CAPSULE | Refills: 0 | Status: SHIPPED | OUTPATIENT
Start: 2022-05-26 | End: 2022-06-06 | Stop reason: SDUPTHER

## 2022-05-31 DIAGNOSIS — K59.09 CHRONIC CONSTIPATION: Primary | ICD-10-CM

## 2022-05-31 RX ORDER — POLYETHYLENE GLYCOL 3350 17 G/17G
17 POWDER, FOR SOLUTION ORAL DAILY
Qty: 510 G | Refills: 0 | Status: SHIPPED | OUTPATIENT
Start: 2022-05-31

## 2022-06-06 DIAGNOSIS — I25.10 CORONARY ARTERY DISEASE INVOLVING NATIVE CORONARY ARTERY OF NATIVE HEART WITHOUT ANGINA PECTORIS: ICD-10-CM

## 2022-06-06 DIAGNOSIS — N40.1 BPH ASSOCIATED WITH NOCTURIA: ICD-10-CM

## 2022-06-06 DIAGNOSIS — R35.1 BPH ASSOCIATED WITH NOCTURIA: ICD-10-CM

## 2022-06-06 RX ORDER — TAMSULOSIN HYDROCHLORIDE 0.4 MG/1
CAPSULE ORAL
Qty: 60 CAPSULE | Refills: 0 | Status: SHIPPED | OUTPATIENT
Start: 2022-06-06 | End: 2022-09-23

## 2022-06-07 RX ORDER — CARVEDILOL 6.25 MG/1
6.25 TABLET ORAL 2 TIMES DAILY WITH MEALS
Qty: 180 TABLET | Refills: 3 | Status: SHIPPED | OUTPATIENT
Start: 2022-06-07 | End: 2022-09-13

## 2022-06-13 DIAGNOSIS — E11.65 TYPE 2 DIABETES MELLITUS WITH HYPERGLYCEMIA, WITHOUT LONG-TERM CURRENT USE OF INSULIN (HCC): ICD-10-CM

## 2022-06-13 RX ORDER — EMPAGLIFLOZIN 10 MG/1
TABLET, FILM COATED ORAL
Qty: 30 TABLET | Refills: 0 | Status: SHIPPED | OUTPATIENT
Start: 2022-06-13 | End: 2022-06-23 | Stop reason: DRUGHIGH

## 2022-06-23 ENCOUNTER — OFFICE VISIT (OUTPATIENT)
Dept: FAMILY MEDICINE CLINIC | Age: 72
End: 2022-06-23
Payer: MEDICAID

## 2022-06-23 VITALS
OXYGEN SATURATION: 98 % | BODY MASS INDEX: 19.93 KG/M2 | WEIGHT: 134.6 LBS | HEIGHT: 69 IN | TEMPERATURE: 97.8 F | HEART RATE: 85 BPM | SYSTOLIC BLOOD PRESSURE: 139 MMHG | DIASTOLIC BLOOD PRESSURE: 71 MMHG | RESPIRATION RATE: 16 BRPM

## 2022-06-23 DIAGNOSIS — E11.22 CKD STAGE 3 DUE TO TYPE 2 DIABETES MELLITUS (HCC): ICD-10-CM

## 2022-06-23 DIAGNOSIS — J31.0 CHRONIC RHINITIS: ICD-10-CM

## 2022-06-23 DIAGNOSIS — E78.5 DYSLIPIDEMIA, GOAL LDL BELOW 100: ICD-10-CM

## 2022-06-23 DIAGNOSIS — I10 HYPERTENSION, ESSENTIAL: ICD-10-CM

## 2022-06-23 DIAGNOSIS — J34.89 NASAL DRAINAGE: ICD-10-CM

## 2022-06-23 DIAGNOSIS — R35.1 BPH ASSOCIATED WITH NOCTURIA: ICD-10-CM

## 2022-06-23 DIAGNOSIS — E11.65 TYPE 2 DIABETES MELLITUS WITH HYPERGLYCEMIA, WITHOUT LONG-TERM CURRENT USE OF INSULIN (HCC): Primary | ICD-10-CM

## 2022-06-23 DIAGNOSIS — Z95.1 S/P CABG X 3: ICD-10-CM

## 2022-06-23 DIAGNOSIS — N40.1 BPH ASSOCIATED WITH NOCTURIA: ICD-10-CM

## 2022-06-23 DIAGNOSIS — N18.30 CKD STAGE 3 DUE TO TYPE 2 DIABETES MELLITUS (HCC): ICD-10-CM

## 2022-06-23 DIAGNOSIS — I95.1 POSTURAL HYPOTENSION: ICD-10-CM

## 2022-06-23 DIAGNOSIS — M16.11 PRIMARY OSTEOARTHRITIS OF RIGHT HIP: ICD-10-CM

## 2022-06-23 LAB
ALBUMIN SERPL-MCNC: 4.1 G/DL (ref 3.5–5)
ALBUMIN/GLOB SERPL: 1.1 {RATIO} (ref 1.1–2.2)
ALP SERPL-CCNC: 89 U/L (ref 45–117)
ALT SERPL-CCNC: 26 U/L (ref 12–78)
ANION GAP SERPL CALC-SCNC: 7 MMOL/L (ref 5–15)
AST SERPL-CCNC: 18 U/L (ref 15–37)
BASOPHILS # BLD: 0.1 K/UL (ref 0–0.1)
BASOPHILS NFR BLD: 1 % (ref 0–1)
BILIRUB SERPL-MCNC: 0.4 MG/DL (ref 0.2–1)
BUN SERPL-MCNC: 29 MG/DL (ref 6–20)
BUN/CREAT SERPL: 16 (ref 12–20)
CALCIUM SERPL-MCNC: 10 MG/DL (ref 8.5–10.1)
CHLORIDE SERPL-SCNC: 102 MMOL/L (ref 97–108)
CHOLEST SERPL-MCNC: 137 MG/DL
CO2 SERPL-SCNC: 29 MMOL/L (ref 21–32)
CREAT SERPL-MCNC: 1.78 MG/DL (ref 0.7–1.3)
CREAT UR-MCNC: 24.7 MG/DL
DIFFERENTIAL METHOD BLD: ABNORMAL
EOSINOPHIL # BLD: 0.2 K/UL (ref 0–0.4)
EOSINOPHIL NFR BLD: 4 % (ref 0–7)
ERYTHROCYTE [DISTWIDTH] IN BLOOD BY AUTOMATED COUNT: 17.5 % (ref 11.5–14.5)
EST. AVERAGE GLUCOSE BLD GHB EST-MCNC: 235 MG/DL
GLOBULIN SER CALC-MCNC: 3.9 G/DL (ref 2–4)
GLUCOSE SERPL-MCNC: 140 MG/DL (ref 65–100)
HBA1C MFR BLD: 9.8 % (ref 4–5.6)
HCT VFR BLD AUTO: 39.3 % (ref 36.6–50.3)
HDLC SERPL-MCNC: 38 MG/DL
HDLC SERPL: 3.6 {RATIO} (ref 0–5)
HGB BLD-MCNC: 11.8 G/DL (ref 12.1–17)
IMM GRANULOCYTES # BLD AUTO: 0 K/UL (ref 0–0.04)
IMM GRANULOCYTES NFR BLD AUTO: 0 % (ref 0–0.5)
LDLC SERPL CALC-MCNC: 43.2 MG/DL (ref 0–100)
LYMPHOCYTES # BLD: 1.1 K/UL (ref 0.8–3.5)
LYMPHOCYTES NFR BLD: 17 % (ref 12–49)
MCH RBC QN AUTO: 24.8 PG (ref 26–34)
MCHC RBC AUTO-ENTMCNC: 30 G/DL (ref 30–36.5)
MCV RBC AUTO: 82.7 FL (ref 80–99)
MICROALBUMIN UR-MCNC: 7.53 MG/DL
MICROALBUMIN/CREAT UR-RTO: 305 MG/G (ref 0–30)
MONOCYTES # BLD: 0.5 K/UL (ref 0–1)
MONOCYTES NFR BLD: 9 % (ref 5–13)
NEUTS SEG # BLD: 4.4 K/UL (ref 1.8–8)
NEUTS SEG NFR BLD: 69 % (ref 32–75)
NRBC # BLD: 0 K/UL (ref 0–0.01)
NRBC BLD-RTO: 0 PER 100 WBC
PLATELET # BLD AUTO: 253 K/UL (ref 150–400)
PMV BLD AUTO: 11.2 FL (ref 8.9–12.9)
POTASSIUM SERPL-SCNC: 4.1 MMOL/L (ref 3.5–5.1)
PROT SERPL-MCNC: 8 G/DL (ref 6.4–8.2)
RBC # BLD AUTO: 4.75 M/UL (ref 4.1–5.7)
SODIUM SERPL-SCNC: 138 MMOL/L (ref 136–145)
TRIGL SERPL-MCNC: 279 MG/DL (ref ?–150)
VLDLC SERPL CALC-MCNC: 55.8 MG/DL
WBC # BLD AUTO: 6.3 K/UL (ref 4.1–11.1)

## 2022-06-23 PROCEDURE — 1123F ACP DISCUSS/DSCN MKR DOCD: CPT | Performed by: FAMILY MEDICINE

## 2022-06-23 PROCEDURE — 99215 OFFICE O/P EST HI 40 MIN: CPT | Performed by: FAMILY MEDICINE

## 2022-06-23 PROCEDURE — 3052F HG A1C>EQUAL 8.0%<EQUAL 9.0%: CPT | Performed by: FAMILY MEDICINE

## 2022-06-23 RX ORDER — IPRATROPIUM BROMIDE 21 UG/1
2 SPRAY, METERED NASAL EVERY 12 HOURS
Qty: 30 ML | Refills: 0 | Status: SHIPPED | OUTPATIENT
Start: 2022-06-23

## 2022-06-23 NOTE — PROGRESS NOTES
Chief Complaint   Patient presents with    Hypertension     follow up    Diabetes     follow up         1. \"Have you been to the ER, urgent care clinic since your last visit? Hospitalized since your last visit? \" No    2. \"Have you seen or consulted any other health care providers outside of the [address] [place] [address] since your last visit? \" No     3. For patients aged 39-70: Has the patient had a colonoscopy / FIT/ Cologuard? No      If the patient is female:    4. For patients aged 41-77: Has the patient had a mammogram within the past 2 years? NA - based on age or sex      11. For patients aged 21-65: Has the patient had a pap smear?  NA - based on age or sex         3 most recent PHQ Screens 6/23/2022   Little interest or pleasure in doing things Not at all   Feeling down, depressed, irritable, or hopeless Not at all   Total Score PHQ 2 0       Health Maintenance Due   Topic Date Due    Eye Exam Retinal or Dilated  08/01/2021    Foot Exam Q1  05/20/2022     Pt didn't have an eye exam done

## 2022-06-23 NOTE — PROGRESS NOTES
HISTORY OF PRESENT ILLNESS  Oz Hong is a 80 y.o. male. He was seen today for 4 months follow-up appointment on diabetes, CKD, CAD, hypertension and dyslipidemia. He has extensive cardiac history. Today he is complaining of right hip and groin pain as well as continuous dripping from his right nose. He is also concerned about lightheadedness when he is changing position, mainly at the nighttime when he wakes up to go to bathroom.   Cardiovascular Review  The patient has hypertension, hyperlipidemia, coronary artery disease, status post coronary artery stenting and had 2 vessel PCI on 11/11/2016, s/p CABG x 3 on 07/09/2018 and now three-vessel PCI on 02/28/2020. Rose Montgomery reports taking medications as instructed, no medication side effects noted, notes stable dyspnea on exertion, no change, no swelling of ankles, no orthostatic dizziness or lightheadedness, no orthopnea or paroxysmal nocturnal dyspnea, no palpitations, no intermittent claudication symptoms.  Diet and Lifestyle: generally follows a low fat low cholesterol diet, generally follows a low sodium diet, nonsmoker.  Lab review: labs reviewed and discussed with patient.  Follows Jinny  Had echocardiogram and nuclear cardiac stress test on March 15, 2021.  Stress test was significant for  · Myocardial perfusion imaging defect 1: There is a defect that is medium in size present in the apical and inferolateral location(s) that is reversible. The defect appears to be ischemia. Perfusion defect was visually and quantitatively present. Myocardial perfusion imaging defect 2: There is a defect that is medium in size affecting the mid and inferolateral location(s) that is non-reversible.  The defect appears to be infarction.     He had left heart catheterization with coronary angiogram on 09/08/21 that showed significant three-vessel disease.  2 out of 3 grafts patent with occluded SVG to [facility]  He had successful PCI of distal left main, proximal circumflex, proximal OM 2 on 02/28/2022. He is on dual antiplatelet now. His symptoms of fatigue and dyspnea has improved significantly since current intervention.   Medicines:ASA, Plavix 75 mg carvedilol, 6.25 mg twice daily  Hctz 25 mg, Atorvastatin 20 mg,Lasix 20 mg and Imdur 30 mg ER .  His candesartan is still on hold      Endocrine Review  He is seen for diabetes.  Since last visit he reports: no polyuria or polydipsia, no chest pain, dyspnea or TIA's, no numbness, tingling or pain in extremities, no unusual visual symptoms, weight has decreased, no significant changes.  Home glucose monitoring: is performed sporadically, nonfasting values range 160-200  He is checking his sugars one per day. Kirti Mullen reports medication compliance: compliant all of the time.  Medication side effects: none.  Diabetic diet compliance: noncompliant some of the time.  Lab review: labs reviewed and discussed with patient.  Eye exam: UTD.    On  Glipizide 5 mg BID (was decreased after last blood work report )and Jardiance 10 mg, Januvia 50 mg, Metformin 1 gm bid.  (taking 500 mg twice daily only)  Lab Results   Component Value Date/Time    Hemoglobin A1c 8.8 (H) 02/03/2022 11:47 AM          CKD:  Stage of Chronic Kidney Disease III exhibited by:  Symptoms:none  Lab Results   Component Value Date/Time    Sodium 132 (L) 02/24/2022 10:45 AM    Potassium 4.8 02/24/2022 10:45 AM    Chloride 102 02/24/2022 10:45 AM    CO2 26 02/24/2022 10:45 AM    Anion gap 4 (L) 02/24/2022 10:45 AM    Glucose 277 (H) 02/24/2022 10:45 AM    BUN 26 (H) 02/24/2022 10:45 AM    Creatinine 1.70 (H) 02/24/2022 10:45 AM    BUN/Creatinine ratio 15 02/24/2022 10:45 AM    GFR est AA 49 (L) 02/24/2022 10:45 AM    GFR est non-AA 40 (L) 02/24/2022 10:45 AM    Calcium 9.4 02/24/2022 10:45 AM       Was referred to nephrologist Dr Galvan, was recommended to change HCTZ 50 mg to 25 mg and to add Lasix 20 mg hyperkalemia.,   his candesartan is on hold due to TANNER and hyperkalemia.       Hip Pain  Patient complains of right hip pain. Onset of the symptoms was several weeks ago. Inciting event: none. Current symptoms include right sided hip pain. Severity = fairly severe  location: lateral, over greater trochanter, anterior  radiates to: right sided inguinal region, groin, right sided  upper leg(s): anterior, lateral  worse with weight bearing  aggravated by walking. Associated symptoms: none. Aggravating symptoms: any weight bearing. Patient's course of pain: gradually worsening. Patient has had no prior hip problems. Previous visits for this problem: none. Evaluation to date: plain films: X-rays were ordered today results are still pending. Treatment to date: none. He also has persistent watery nasal dripping. Has been prescribed several nasal spray, without any improvement. He was referred to ENT specialist but he did not have any improvement. Review of Systems   Constitutional: Negative for chills, fever and malaise/fatigue. HENT: Negative for congestion, ear pain, sore throat and tinnitus. Dripping nose   Eyes: Negative for blurred vision, double vision, pain and discharge. Respiratory: Negative for cough, shortness of breath and wheezing. Cardiovascular: Negative for chest pain, palpitations and leg swelling. Lightheadedness   Gastrointestinal: Negative for abdominal pain, blood in stool, constipation, diarrhea, nausea and vomiting. Genitourinary: Negative for dysuria, frequency, hematuria and urgency. Musculoskeletal: Negative for back pain, joint pain and myalgias. Right hip pain   Skin: Negative for rash. Neurological: Positive for dizziness. Negative for tremors, seizures and headaches. Endo/Heme/Allergies: Negative for polydipsia. Does not bruise/bleed easily. Psychiatric/Behavioral: Negative for depression and substance abuse. The patient is not nervous/anxious. Physical Exam  Vitals and nursing note reviewed.    Constitutional: Appearance: He is well-developed. He is not diaphoretic. HENT:      Head: Normocephalic and atraumatic. Right Ear: External ear normal.      Nose: Nasal deformity, mucosal edema and rhinorrhea (From right nose only) present. Rhinorrhea is purulent. Mouth/Throat:      Pharynx: No oropharyngeal exudate. Eyes:      General: No scleral icterus. Conjunctiva/sclera: Conjunctivae normal.      Pupils: Pupils are equal, round, and reactive to light. Neck:      Thyroid: No thyromegaly. Vascular: No JVD. Cardiovascular:      Rate and Rhythm: Normal rate and regular rhythm. Pulses:           Dorsalis pedis pulses are 2+ on the right side and 2+ on the left side. Posterior tibial pulses are 2+ on the right side and 2+ on the left side. Heart sounds: Normal heart sounds. No murmur heard. Pulmonary:      Effort: Pulmonary effort is normal.      Breath sounds: Normal breath sounds. No wheezing. Abdominal:      General: Bowel sounds are normal. There is no distension. Palpations: Abdomen is soft. There is no mass. Musculoskeletal:      Cervical back: Normal range of motion and neck supple. Right hip: Tenderness present. Decreased range of motion. Comments: Right SLR negative but right Carpenter Sous and Fadir strongly positive with pain referred to groin and lateral hip joint. Significant stiffness and restriction of movements   Feet:      Right foot:      Protective Sensation: 10 sites tested. 9 sites sensed. Toenail Condition: Fungal disease present. Left foot:      Protective Sensation: 10 sites tested. 9 sites sensed. Toenail Condition: Fungal disease present. Comments: Toenails are very brittle and white  Lymphadenopathy:      Cervical: No cervical adenopathy. Skin:     General: Skin is warm and dry. Findings: No rash. Neurological:      Mental Status: He is alert and oriented to person, place, and time.       Cranial Nerves: No cranial nerve deficit. Deep Tendon Reflexes: Reflexes are normal and symmetric. Reflexes normal.         ASSESSMENT and PLAN  Diagnoses and all orders for this visit:    1. Type 2 diabetes mellitus with hyperglycemia, without long-term current use of insulin ([address]  Assessment & Plan:   borderline controlled, continue current medications pending work up below, medication adherence emphasized, lifestyle modifications recommended    Orders:  -      DIABETES FOOT EXAM  -     MICROALBUMIN, UR, RAND W/ MICROALB/CREAT RATIO; Future  -     METABOLIC PANEL, COMPREHENSIVE; Future  -     LIPID PANEL; Future  -     HEMOGLOBIN A1C WITH EAG; Future    2. CKD stage 3 due to type 2 diabetes mellitus [facility])  Assessment & Plan:   borderline controlled, continue current medications, continue current treatment plan, medication adherence emphasized follows kidney specialist Dr. Adele Hull. Has not been  to him for few months    Orders:  -     MICROALBUMIN, UR, RAND W/ MICROALB/CREAT RATIO; Future  -     METABOLIC PANEL, COMPREHENSIVE; Future  -     CBC WITH AUTOMATED DIFF; Future    3. S/P CABG x 3  -     LIPID PANEL; Future    4. BPH associated with nocturia    5. Dyslipidemia, goal LDL below 464  -     METABOLIC PANEL, COMPREHENSIVE; Future  -     LIPID PANEL; Future    6. Hypertension, essential  -     METABOLIC PANEL, COMPREHENSIVE; Future    7. Primary osteoarthritis of right hip  -     XR HIP RT W OR WO PELV 2-3 VWS; Future    8. Chronic rhinitis  -     ipratropium (ATROVENT) 21 mcg (0.03 %) nasal spray; 2 Sprays by Both Nostrils route every twelve (12) hours. -     CT SINUSES W WO CONT; Future    9. Postural hypotension  -     CT SINUSES W WO CONT; Future    10. Nasal drainage  X-ray of hip was ordered. Most likely patient has degenerative arthritis. Once I receive and review result, we will reach out to patient. Patient with persistent purulent right-sided nasal drainage for several years now.   Will rule out malignancy or osteolytic lesions with CT scan. Discussed lifestyle issues and health guidance given  Patient was given an after visit summary which includes diagnoses, vital signs, current medications, instructions and references & authorized prescriptions . Results of labs will be conveyed to patient, once available. Pt verbalized instructions I provided and expressed understanding of discussion that was held today. Follow-up and Dispositions    · Return in about 4 months (around 10/23/2022) for follow up, fasting. Please note that this dictation was completed with Zazoom, the computer voice recognition software. Quite often unanticipated grammatical, syntax, homophones, and other interpretive errors are inadvertently transcribed by the computer software. Please disregard these errors. Please excuse any errors that have escaped final proofreading. Thank you.

## 2022-06-23 NOTE — ASSESSMENT & PLAN NOTE
borderline controlled, continue current medications, continue current treatment plan, medication adherence emphasized follows kidney specialist Dr. Osiel Fuller.   Has not been  to him for few months

## 2022-06-23 NOTE — PATIENT INSTRUCTIONS
Hip Arthritis: Exercises  Introduction  Here are some examples of exercises for you to try. The exercises may be suggested for a condition or for rehabilitation. Start each exercise slowly. Ease off the exercises if you start to have pain. You will be told when to start these exercises and which ones will work best for you. How to do the exercises  Straight-leg raises to the outside    1. Lie on your side, with your affected hip on top. 2. Tighten the front thigh muscles of your top leg to keep your knee straight. 3. Keep your hip and your leg straight in line with the rest of your body, and keep your knee pointing forward. Do not drop your hip back. 4. Lift your top leg straight up toward the ceiling, about 12 inches off the floor. Hold for about 6 seconds, then slowly lower your leg. 5. Repeat 8 to 12 times. 6. Switch legs and repeat steps 1 through 5, even if only one hip is sore. Straight-leg raises to the inside    1. Lie on your side with your affected hip on the floor. 2. You can either prop up your other leg on a chair, or you can bend that knee and put that foot in front of your other knee. Do not drop your hip back. 3. Tighten the muscles on the front thigh of your bottom leg to straighten that knee. 4. Keep your kneecap pointing forward and your leg straight, and lift your bottom leg up toward the ceiling about 6 inches. Hold for about 6 seconds, then lower slowly. 5. Repeat 8 to 12 times. 6. Switch legs and repeat steps 1 through 5, even if only one hip is sore. Hip hike    1. Stand sideways on the bottom step of a staircase, and hold on to the banister or wall. 2. Keeping both knees straight, lift your good leg off the step and let it hang down. Then hike your good hip up to the same level as your affected hip or a little higher. 3. Repeat 8 to 12 times. 4. Switch legs and repeat steps 1 through 3, even if only one hip is sore. Bridging    1. Lie on your back with both knees bent. Your knees should be bent about 90 degrees. 2. Then push your feet into the floor, squeeze your buttocks, and lift your hips off the floor until your shoulders, hips, and knees are all in a straight line. 3. Hold for about 6 seconds as you continue to breathe normally, and then slowly lower your hips back down to the floor and rest for up to 10 seconds. 4. Repeat 8 to 12 times. Hamstring stretch (lying down)    1. Lie flat on your back with your legs straight. If you feel discomfort in your back, place a small towel roll under your lower back. 2. Holding the back of your affected leg, lift your leg straight up and toward your body until you feel a stretch at the back of your thigh. 3. Hold the stretch for at least 30 seconds. 4. Repeat 2 to 4 times. 5. Switch legs and repeat steps 1 through 4, even if only one hip is sore. Standing quadriceps stretch    1. If you are not steady on your feet, hold on to a chair, counter, or wall. You can also lie on your stomach or your side to do this exercise. 2. Bend the knee of the leg you want to stretch, and reach behind you to grab the front of your foot or ankle with the hand on the same side. For example, if you are stretching your right leg, use your right hand. 3. Keeping your knees next to each other, pull your foot toward your buttock until you feel a gentle stretch across the front of your hip and down the front of your thigh. Your knee should be pointed directly to the ground, and not out to the side. 4. Hold the stretch for at least 15 to 30 seconds. 5. Repeat 2 to 4 times. 6. Switch legs and repeat steps 1 through 5, even if only one hip is sore. Hip rotator stretch    1. Lie on your back with both knees bent and your feet flat on the floor. 2. Put the ankle of your affected leg on your opposite thigh near your knee. 3. Use your hand to gently push your knee away from your body until you feel a gentle stretch around your hip.   4. Hold the stretch for 15 to 30 seconds. 5. Repeat 2 to 4 times. 6. Repeat steps 1 through 5, but this time use your hand to gently pull your knee toward your opposite shoulder. 7. Switch legs and repeat steps 1 through 6, even if only one hip is sore. Knee-to-chest    1. Lie on your back with your knees bent and your feet flat on the floor. 2. Bring your affected leg to your chest, keeping the other foot flat on the floor (or keeping the other leg straight, whichever feels better on your lower back). 3. Keep your lower back pressed to the floor. Hold for at least 15 to 30 seconds. 4. Relax, and lower the knee to the starting position. 5. Repeat 2 to 4 times. 6. Switch legs and repeat steps 1 through 5, even if only one hip is sore. 7. To get more stretch, put your other leg flat on the floor while pulling your knee to your chest.  Clamshell    1. Lie on your side, with your affected hip on top. Keep your feet and knees together and your knees bent. 2. Raise your top knee, but keep your feet together. Do not let your hips roll back. Your legs should open up like a clamshell. 3. Hold for 6 seconds. 4. Slowly lower your knee back down. Rest for 10 seconds. 5. Repeat 8 to 12 times. 6. Switch legs and repeat steps 1 through 5, even if only one hip is sore. Follow-up care is a key part of your treatment and safety. Be sure to make and go to all appointments, and call your doctor if you are having problems. It's also a good idea to know your test results and keep a list of the medicines you take. Where can you learn more? Go to http://www.gray.com/  Enter U578 in the search box to learn more about \"Hip Arthritis: Exercises. \"  Current as of: July 1, 2021               Content Version: 13.2  © 7303-6322 [employer], [employer]. Care instructions adapted under license by [employer] (which disclaims liability or warranty for this information).  If you have questions about a medical condition or this instruction, always ask your healthcare professional. [place] [address] any warranty or liability for your use of this information.

## 2022-06-23 NOTE — PROGRESS NOTES
[place] Kristi,  I have reviewed your results. I am extremely upset that your sugar is very high and again in uncontrolled range. This is not good for your heart and kidneys. No need to focus on your diet and now if your shortness of breath has improved you need to exercise regularly. Please make sure you take metformin full dose, 1 tablet 2 times. I am also increasing your Jardiance from 10 mg to 25 mg.  I am sending new prescription to pharmacy. We already discussed about option for injectable like Ozempic every week. But I will wait till next blood work. I will recommend you to follow-up in 3 months, not in 4 months. Blood count shows low hemoglobin but has improved from your previous results. Cholesterol results are showing normal total and bad cholesterol but triglycerides are extremely high likely from uncontrolled diabetes. Kidney function is abnormal but stable. Urine protein is worsening from last time due to high sugar. I have also ordered CT scan for your sinuses to find out why you have dripping from the right side of nose. Please call [phone] to schedule appointment. So, I have increased dose of Jardiance. We will continue on metformin and will take 1 tablet 2 times, not half tablet 2 times. And please schedule for CT sinuses. Schedule appointment for 3 months strictly.   Thanks

## 2022-06-23 NOTE — ASSESSMENT & PLAN NOTE
borderline controlled, continue current medications pending work up below, medication adherence emphasized, lifestyle modifications recommended

## 2022-06-24 ENCOUNTER — APPOINTMENT (OUTPATIENT)
Dept: FAMILY MEDICINE CLINIC | Age: 72
End: 2022-06-24

## 2022-06-25 DIAGNOSIS — E11.65 TYPE 2 DIABETES MELLITUS WITH HYPERGLYCEMIA, WITHOUT LONG-TERM CURRENT USE OF INSULIN (HCC): ICD-10-CM

## 2022-06-25 DIAGNOSIS — E87.5 HYPERKALEMIA: ICD-10-CM

## 2022-06-26 RX ORDER — FUROSEMIDE 20 MG/1
TABLET ORAL
Qty: 90 TABLET | Refills: 0 | Status: SHIPPED | OUTPATIENT
Start: 2022-06-26 | End: 2022-09-23

## 2022-06-26 RX ORDER — GLIPIZIDE 5 MG/1
TABLET ORAL
Qty: 180 TABLET | Refills: 0 | Status: SHIPPED | OUTPATIENT
Start: 2022-06-26 | End: 2022-09-23

## 2022-06-26 RX ORDER — METFORMIN HYDROCHLORIDE 1000 MG/1
TABLET ORAL
Qty: 180 TABLET | Refills: 0 | Status: SHIPPED | OUTPATIENT
Start: 2022-06-26 | End: 2022-09-23

## 2022-06-26 RX ORDER — SITAGLIPTIN 50 MG/1
TABLET, FILM COATED ORAL
Qty: 90 TABLET | Refills: 0 | Status: SHIPPED | OUTPATIENT
Start: 2022-06-26 | End: 2022-09-23

## 2022-06-30 ENCOUNTER — TELEPHONE (OUTPATIENT)
Dept: FAMILY MEDICINE CLINIC | Age: 72
End: 2022-06-30

## 2022-06-30 NOTE — TELEPHONE ENCOUNTER
MD [facility] requesting clarification if patient on both HCTZ and Furosemide. Rx for hctz sent 6/24/22 for 90 and furosemide  6/26/22 for 90. Patient has been on both for years. Can call [employer] to verify patient on both. Just let me know.   Thanks, Starleen Phlegm

## 2022-06-30 NOTE — TELEPHONE ENCOUNTER
Yes he is on both as per kidney specialist recommendation. Furosemide was added by kidney specialist to treat hyperkalemia and 1 of blood pressure medicine valsartan was discontinued. Please let pharmacy know.   Thanks

## 2022-07-15 ENCOUNTER — HOSPITAL ENCOUNTER (OUTPATIENT)
Dept: CT IMAGING | Age: 72
Discharge: HOME | End: 2022-07-15
Attending: FAMILY MEDICINE
Payer: MEDICAID

## 2022-07-15 DIAGNOSIS — I95.1 POSTURAL HYPOTENSION: ICD-10-CM

## 2022-07-15 DIAGNOSIS — J31.0 CHRONIC RHINITIS: ICD-10-CM

## 2022-07-15 PROCEDURE — 70486 CT MAXILLOFACIAL W/O DYE: CPT

## 2022-07-17 NOTE — PROGRESS NOTES
Isaac Card,  I have reviewed results for CT scanning of her sinuses. It shows changes of chronic sinusitis and nasal septal deviation and negative for any abnormal growth or cancerous lesions. Recommend to continue with daily nasal spray and to follow-up with ENT for further management. Let me know if you have any questions, thanks.

## 2022-07-19 NOTE — PROGRESS NOTES
Results discussed with patient by Jewels Garcia via [employer]. Patient has read results.

## 2022-08-12 DIAGNOSIS — E11.65 TYPE 2 DIABETES MELLITUS WITH HYPERGLYCEMIA, WITHOUT LONG-TERM CURRENT USE OF INSULIN (HCC): ICD-10-CM

## 2022-08-12 NOTE — TELEPHONE ENCOUNTER
Patient asking for 90 d/s rx. (Last rx 6/23/22 for 30 + 5)  Jeaneth Corcoran    Last Visit: 6/23/22 MD Albertina Olivera  Next Appointment: 9/27/22 MD Albertina Olivera  Previous Refill Encounter(s): 6/23/22 30 + 5    Requested Prescriptions     Pending Prescriptions Disp Refills    empagliflozin (JARDIANCE) 25 mg tablet 90 Tablet 1     Sig: Take 1 Tablet by mouth in the morning. For [address] in place:   Recommendation Provided To:    Intervention Detail: New Rx: 1, reason: Patient Preference  Gap Closed?:   Intervention Accepted By:   Time Spent (min): 5

## 2022-09-12 DIAGNOSIS — I10 HYPERTENSION, ESSENTIAL: ICD-10-CM

## 2022-09-12 RX ORDER — HYDROCHLOROTHIAZIDE 25 MG/1
TABLET ORAL
Qty: 90 TABLET | Refills: 0 | Status: SHIPPED | OUTPATIENT
Start: 2022-09-12

## 2022-09-12 NOTE — TELEPHONE ENCOUNTER
Requested Prescriptions     Pending Prescriptions Disp Refills    hydroCHLOROthiazide (HYDRODIURIL) 25 mg tablet [Pharmacy Med Name: hydroCHLOROthiazide 25 MG Oral Tablet] 90 Tablet 0     Sig: Take 1 tablet by mouth once daily

## 2022-09-13 ENCOUNTER — OFFICE VISIT (OUTPATIENT)
Dept: CARDIOLOGY CLINIC | Age: 72
End: 2022-09-13
Payer: MEDICARE

## 2022-09-13 VITALS
RESPIRATION RATE: 16 BRPM | DIASTOLIC BLOOD PRESSURE: 60 MMHG | HEIGHT: 69 IN | OXYGEN SATURATION: 99 % | HEART RATE: 97 BPM | BODY MASS INDEX: 19.7 KG/M2 | WEIGHT: 133 LBS | SYSTOLIC BLOOD PRESSURE: 100 MMHG

## 2022-09-13 DIAGNOSIS — Z78.9 STATIN INTOLERANCE: ICD-10-CM

## 2022-09-13 DIAGNOSIS — Z95.1 S/P CABG X 3: ICD-10-CM

## 2022-09-13 DIAGNOSIS — I35.8 NON-RHEUMATIC AORTIC SCLEROSIS: ICD-10-CM

## 2022-09-13 DIAGNOSIS — R53.83 FATIGUE, UNSPECIFIED TYPE: ICD-10-CM

## 2022-09-13 DIAGNOSIS — I25.10 CORONARY ARTERY DISEASE INVOLVING NATIVE CORONARY ARTERY OF NATIVE HEART WITHOUT ANGINA PECTORIS: Primary | ICD-10-CM

## 2022-09-13 DIAGNOSIS — E78.5 DYSLIPIDEMIA: ICD-10-CM

## 2022-09-13 DIAGNOSIS — I10 ESSENTIAL HYPERTENSION: ICD-10-CM

## 2022-09-13 PROCEDURE — G8754 DIAS BP LESS 90: HCPCS | Performed by: SPECIALIST

## 2022-09-13 PROCEDURE — 1101F PT FALLS ASSESS-DOCD LE1/YR: CPT | Performed by: SPECIALIST

## 2022-09-13 PROCEDURE — G8420 CALC BMI NORM PARAMETERS: HCPCS | Performed by: SPECIALIST

## 2022-09-13 PROCEDURE — 1123F ACP DISCUSS/DSCN MKR DOCD: CPT | Performed by: SPECIALIST

## 2022-09-13 PROCEDURE — G8752 SYS BP LESS 140: HCPCS | Performed by: SPECIALIST

## 2022-09-13 PROCEDURE — G8536 NO DOC ELDER MAL SCRN: HCPCS | Performed by: SPECIALIST

## 2022-09-13 PROCEDURE — G8510 SCR DEP NEG, NO PLAN REQD: HCPCS | Performed by: SPECIALIST

## 2022-09-13 PROCEDURE — 3017F COLORECTAL CA SCREEN DOC REV: CPT | Performed by: SPECIALIST

## 2022-09-13 PROCEDURE — G8427 DOCREV CUR MEDS BY ELIG CLIN: HCPCS | Performed by: SPECIALIST

## 2022-09-13 PROCEDURE — 99214 OFFICE O/P EST MOD 30 MIN: CPT | Performed by: SPECIALIST

## 2022-09-13 PROCEDURE — 93000 ELECTROCARDIOGRAM COMPLETE: CPT | Performed by: SPECIALIST

## 2022-09-13 NOTE — PATIENT INSTRUCTIONS
Please stop your carvedilol and have your labs completed today at the lab downstairs. You have been scheduled for a lexiscan nuclear stress test and an echocardiogram.     Please arrive 15 minutes prior to your appointment. Wear comfortable clothing and walking or athletic shoes. Do not eat or drink anything, except water, for at least 4 hours prior to your appointment. Avoid tobacco products for at least 12 hours prior to your test.    Do not eat or drink anything containing caffeine, including but not limited to the following: chocolate, regular and decaffeinated coffee, soft drinks, or tea for at least 24 hours prior to your test.    Do hold your scheduled medications prior to your test. Do not take your Imdur 24 hours prior. Your test will be performed on a 1 day protocol. This is determined by your height, weight, and other risk factors. For a 1 day test, please allow for 4 hours in the office that day.

## 2022-09-13 NOTE — PROGRESS NOTES
Neal Kendall     1950       Monik Chandler MD, [facility]  Date of Visit-9/13/2022   PCP is Elton Gill MD   [facility]  [facility]  HPI:  Frandy Arreola is a 80 y.o. male   4-month follow-up  He feels tired at work not short of breath or having chest pain he gets some dizziness. Short systolic murmur clear lungs no edema  Complains is new fatigue. He says he is just tired when he tries to be at work lifting things is not a shortness of breath he has no chest pain. Rarely he feels dizzy going up the stairs. EKG 9/13/2022 sinus rhythm left atrial enlargement ST depression about half to 1 mm in leads I to aVL and V4 through V6    Plan we will stop his Coreg get a CBC CMP and TSH if he is not better we will get a exercise nuclear stress test and echocardiogram.    Future Appointments   Date Time Provider Mina Delgado   10/12/2022  8:20 AM Elton Gill MD PA[facility] BS AMB   11/21/2022  3:40 PM MD TEZ Andrade BS AMB        CAD with CABG 6/9/2018. NSTEMI in November 2016 and resolved pulmonary HTN. Stent to circumflex an LAD in 2016. Stress echo in 2018 with a drop in BP with exercise and a drop in EF. Cath on 6/22/18 that showed even with a 5F catheter, he dampened with suspected ostial 3 vessel disease. Echo 3/27/2021 = EF 55 to 60% mild AR MR TR LAE MAC  Nuclear 3/18/2021 EF 64% medium size defect apical and inferior lateral reversible ischemia medium defect mid and inferolateral nonreversible appear to be infarction intermediate risk stress test .  Pt had procedure with PCI 2/28/22.  02/28/22    CARDIAC PROCEDURE 02/28/2022 2/28/2022    Conclusion  Findings  1. Severe native multivessel coronary disease  2. Patent LIMA to LAD, vein graft to distal RCA with severe disease in the native vessels  3. Occluded vein graft to OM 2.   Native OM 2 severely diseased along with proximal to mid circumflex as well as distal left main.  Overall the vessel is significantly remodeled negatively. Additionally there is significant disease in the first marginal branch which is even smaller as well as AV groove branch right at the takeoff of OM2. Single stent 2.25 x 28 mm Xience was placed extending from the OM 2 into the circumflex into the distal left main and sequentially dilated with 2.25 noncompliant, 2 5 noncompliant, 3 oh noncompliant and 3 5 noncompliant to perform multilevel POT to manage multiple bifurcations on the way. Atherectomy was considered but given small size of the vessels, was not deemed to be absolutely safe for the obtuse marginal lesion. Overall stent expanded fairly well related to the size of the vessels. 4.  Normal LVEDP    Access right radial: Small vessel, tortuous} brachiocephalic  Contrast 65 cc    Recommendations  1. Plavix based dual antiplatelet therapy  2. Guideline directed medical therapy for secondary prevention of coronary events    Signed by: Abiodun Ordonez MD on 2/28/2022  4:22 PM        Assessment/Plan:     1. Coronary artery disease involving native coronary artery of native heart without angina pectoris  Angina has resolved with latest stenting to OM. He seems to feel substantially better with his SOB. Will continue with med therapy and f/u in 6 months. CABG 2018. cath 9/8/21 open LIMA. Now with fatigue  Plan we will stop his Coreg get a CBC CMP and TSH if he is not better we will get a exercise nuclear stress test and echocardiogram.    2. Hypertension, essential  At goal , meds and possible side effects reviewed and patient denies  Key CAD CHF Meds               hydroCHLOROthiazide (HYDRODIURIL) 25 mg tablet (Taking) Take 1 tablet by mouth once daily    furosemide (LASIX) 20 mg tablet (Taking) Take 1 tablet by mouth once daily    carvediloL (COREG) 6.25 mg tablet (Taking) Take 1 Tablet by mouth two (2) times daily (with meals).     isosorbide mononitrate ER (IMDUR) 30 mg tablet (Taking) Take 1 tablet by mouth once daily    clopidogreL (Plavix) 75 mg tab (Taking) Take 1 Tablet by mouth daily for 360 days. Indications: a sudden worsening of angina called acute coronary syndrome    rosuvastatin (CRESTOR) 20 mg tablet (Taking) Take 1 Tablet by mouth nightly. aspirin delayed-release 81 mg tablet (Taking) Take 1 Tab by mouth. BP Readings from Last 6 Encounters:   09/13/22 100/60   06/23/22 139/71   05/18/22 120/62   02/28/22 (!) 166/75   02/03/22 104/63   01/19/22 (!) 140/80           3. Dyslipidemia  At goal , denies excess muscle aches or new liver issues  Key Antihyperlipidemia Meds               rosuvastatin (CRESTOR) 20 mg tablet (Taking) Take 1 Tablet by mouth nightly. Lab Results   Component Value Date/Time    LDL, calculated 43.2 06/23/2022 09:40 AM          4. Non-rheumatic aortic sclerosis  03/15/21    ECHO ADULT COMPLETE 03/27/2021 3/27/2021    Interpretation Summary  · LV: Estimated LVEF is 55 - 60%. Biplane method used to measure ejection fraction. Normal cavity size and systolic function (ejection fraction normal). Mild concentric hypertrophy. Wall motion: normal. Mild (grade 1) left ventricular diastolic dysfunction. · AV: Aortic valve leaflet calcification present. Mild aortic valve regurgitation is present. · MV: Mitral valve thickening. Mitral valve leaflet calcification. Moderate mitral annular calcification. Mild mitral valve regurgitation is present. · TV: Mild tricuspid valve regurgitation is present. · PV: Mild pulmonic valve regurgitation is present. · LA: Mildly dilated left atrium. · PA: Pulmonary arterial systolic pressure is 33 mmHg. Signed by: Anirudh Weems MD on 3/27/2021  7:40 PM  5. S/P CABG x 3  6. Statin intolerance     Impression:   1. Coronary artery disease involving native coronary artery of native heart without angina pectoris    2. Fatigue, unspecified type    3. Essential hypertension    4. Dyslipidemia    5.  Non-rheumatic aortic sclerosis    6. S/P CABG x 3    7. Statin intolerance       Cardiac History:   Echo 1-15-18 EF 63% 63 %. Mild LAE, MAC, mild AS mean 8, HUY 1.8 mild TR  CABG x 3 7-9-18= LIMA to LAD, SVG-OM, SVG-dRCA   Echo 6-22-18 EF 55-60%, mod MAC, aortic sclerosis without stenosis , mild TR  LISA 6-18-18 4 minutes +moderate hypokinesis of the mid anteroseptal, entire inferior, apical septal, and apical wall(s). With exercise a mild reduction in LV function. PCI 11-11-16 JEREMY to mLAD 95% JEREMY to OM1 90%     Future Appointments   Date Time Provider Mina Delgado   10/12/2022  8:20 AM Maria Luisa Man MD [facility]   11/21/2022  3:40 PM Elizabeth Chau MD [facility]        Patient Care Team:  Maria Luisa Man MD as PCP - General (Family Medicine)  Maria Luisa Man MD as PCP - [facility]  Elizabeth Chau MD (Cardiovascular Disease Physician)  Indra Anderson MD (Gastroenterology)  Shagufta López MD (Cardiothoracic Surgery)  Nicole Kimball MD (Cardiovascular Disease Physician)    ROS-except as noted above. . A complete cardiac and respiratory are reviewed and negative except as above ; Resp-denies wheezing  or productive cough,. Const- No unusual weight loss or fever; Neuro-no recent seizure or CVA ; GI- No BRBPR, abdom pain, bloating ; - no  hematuria   see supplement sheet, initialed and to be scanned by staff  Past Medical History:   Diagnosis Date    CAD (coronary artery disease) 11/10/2016    NSTEMI & 2 stents    Deafness 10/28/2012    DM (diabetes mellitus) ([facility] [address]     Elevated cholesterol     Hypertension     NSTEMI (non-ST elevated myocardial infarction) ([facility] [address] 11/10/2016      Social Hx= reports that he has never smoked. He has never used smokeless tobacco. He reports current alcohol use of about 2.0 standard drinks per week. He reports that he does not use drugs.      Exam and Labs:  /60   Pulse 97   Resp 16   Ht 5' 9\" (1.753 m)   Wt 133 lb (60.3 kg)   SpO2 99%   BMI 19.64 kg/m² Constitutional:  NAD, comfortable  Head: NC,AT. Eyes: No scleral icterus. Neck:  Neck supple. No JVD present. Throat: moist mucous membranes. Chest: Effort normal & normal respiratory excursion . Neurological: alert, conversant and oriented . Skin: Skin is not cold. No obvious systemic rash noted. Not diaphoretic. No erythema. Psychiatric:  Grossly normal mood and affect. Behavior appears normal. Extremities:  no clubbing or cyanosis. Abdomen: non distended    Lungs:breath sounds normal. No stridor. distress, wheezes or  Rales. Heart:2/6 murmur unchanged normal rate, regular rhythm, normal S1, S2, no murmurs, rubs, clicks or gallops , PMI non displaced. Edema: Edema is none. Lab Results   Component Value Date/Time    Cholesterol, total 137 06/23/2022 09:40 AM    HDL Cholesterol 38 06/23/2022 09:40 AM    LDL, calculated 43.2 06/23/2022 09:40 AM    Triglyceride 279 (H) 06/23/2022 09:40 AM    CHOL/HDL Ratio 3.6 06/23/2022 09:40 AM     Lab Results   Component Value Date/Time    Sodium 138 06/23/2022 09:40 AM    Potassium 4.1 06/23/2022 09:40 AM    Chloride 102 06/23/2022 09:40 AM    CO2 29 06/23/2022 09:40 AM    Anion gap 7 06/23/2022 09:40 AM    Glucose 140 (H) 06/23/2022 09:40 AM    BUN 29 (H) 06/23/2022 09:40 AM    Creatinine 1.78 (H) 06/23/2022 09:40 AM    BUN/Creatinine ratio 16 06/23/2022 09:40 AM    GFR est AA 46 (L) 06/23/2022 09:40 AM    GFR est non-AA 38 (L) 06/23/2022 09:40 AM    Calcium 10.0 06/23/2022 09:40 AM      Wt Readings from Last 3 Encounters:   09/13/22 133 lb (60.3 kg)   06/23/22 134 lb 9.6 oz (61.1 kg)   05/18/22 139 lb (63 kg)      BP Readings from Last 3 Encounters:   09/13/22 100/60   06/23/22 139/71   05/18/22 120/62      Current Outpatient Medications   Medication Sig    hydroCHLOROthiazide (HYDRODIURIL) 25 mg tablet Take 1 tablet by mouth once daily    empagliflozin (JARDIANCE) 25 mg tablet Take 1 Tablet by mouth in the morning.     glipiZIDE (GLUCOTROL) 5 mg tablet Take 1 tablet by mouth twice daily    furosemide (LASIX) 20 mg tablet Take 1 tablet by mouth once daily    metFORMIN (GLUCOPHAGE) 1,000 mg tablet TAKE 1 TABLET BY MOUTH TWICE DAILY WITH MEALS    Januvia 50 mg tablet Take 1 tablet by mouth once daily    ipratropium (ATROVENT) 21 mcg (0.03 %) nasal spray 2 Sprays by Both Nostrils route every twelve (12) hours. carvediloL (COREG) 6.25 mg tablet Take 1 Tablet by mouth two (2) times daily (with meals). tamsulosin (FLOMAX) 0.4 mg capsule Take 1 capsule by mouth once daily    polyethylene glycol (MIRALAX) 17 gram/dose powder Take 17 g by mouth daily. albendazole (ALBENZA) 200 mg tablet TAKE 2 TABLETS BY MOUTH ONCE FOR 1 DOSE. THEN REPEAT IN ONE WEEK    isosorbide mononitrate ER (IMDUR) 30 mg tablet Take 1 tablet by mouth once daily    clopidogreL (Plavix) 75 mg tab Take 1 Tablet by mouth daily for 360 days. Indications: a sudden worsening of angina called acute coronary syndrome    rosuvastatin (CRESTOR) 20 mg tablet Take 1 Tablet by mouth nightly. aspirin delayed-release 81 mg tablet Take 1 Tab by mouth. No current facility-administered medications for this visit. Impression see above.

## 2022-09-23 DIAGNOSIS — E87.5 HYPERKALEMIA: ICD-10-CM

## 2022-09-23 DIAGNOSIS — R35.1 BPH ASSOCIATED WITH NOCTURIA: ICD-10-CM

## 2022-09-23 DIAGNOSIS — E11.65 TYPE 2 DIABETES MELLITUS WITH HYPERGLYCEMIA, WITHOUT LONG-TERM CURRENT USE OF INSULIN (HCC): ICD-10-CM

## 2022-09-23 DIAGNOSIS — N40.1 BPH ASSOCIATED WITH NOCTURIA: ICD-10-CM

## 2022-09-23 RX ORDER — METFORMIN HYDROCHLORIDE 1000 MG/1
TABLET ORAL
Qty: 180 TABLET | Refills: 0 | Status: SHIPPED | OUTPATIENT
Start: 2022-09-23

## 2022-09-23 RX ORDER — TAMSULOSIN HYDROCHLORIDE 0.4 MG/1
CAPSULE ORAL
Qty: 60 CAPSULE | Refills: 0 | Status: SHIPPED | OUTPATIENT
Start: 2022-09-23

## 2022-09-23 RX ORDER — FUROSEMIDE 20 MG/1
TABLET ORAL
Qty: 90 TABLET | Refills: 0 | Status: SHIPPED | OUTPATIENT
Start: 2022-09-23

## 2022-09-23 RX ORDER — GLIPIZIDE 5 MG/1
TABLET ORAL
Qty: 180 TABLET | Refills: 0 | Status: SHIPPED | OUTPATIENT
Start: 2022-09-23

## 2022-09-23 RX ORDER — SITAGLIPTIN 50 MG/1
TABLET, FILM COATED ORAL
Qty: 90 TABLET | Refills: 0 | Status: SHIPPED | OUTPATIENT
Start: 2022-09-23

## 2022-10-12 ENCOUNTER — OFFICE VISIT (OUTPATIENT)
Dept: FAMILY MEDICINE CLINIC | Age: 72
End: 2022-10-12
Payer: MEDICARE

## 2022-10-12 VITALS
OXYGEN SATURATION: 100 % | TEMPERATURE: 96.4 F | WEIGHT: 134 LBS | HEIGHT: 69 IN | DIASTOLIC BLOOD PRESSURE: 70 MMHG | RESPIRATION RATE: 16 BRPM | SYSTOLIC BLOOD PRESSURE: 120 MMHG | BODY MASS INDEX: 19.85 KG/M2 | HEART RATE: 99 BPM

## 2022-10-12 DIAGNOSIS — E11.22 CKD STAGE 3 DUE TO TYPE 2 DIABETES MELLITUS (HCC): ICD-10-CM

## 2022-10-12 DIAGNOSIS — I35.0 NONRHEUMATIC AORTIC VALVE STENOSIS: ICD-10-CM

## 2022-10-12 DIAGNOSIS — Z95.1 S/P CABG X 3: ICD-10-CM

## 2022-10-12 DIAGNOSIS — E55.9 VITAMIN D DEFICIENCY: ICD-10-CM

## 2022-10-12 DIAGNOSIS — E78.5 DYSLIPIDEMIA, GOAL LDL BELOW 70: ICD-10-CM

## 2022-10-12 DIAGNOSIS — E11.65 TYPE 2 DIABETES MELLITUS WITH HYPERGLYCEMIA, WITHOUT LONG-TERM CURRENT USE OF INSULIN (HCC): Primary | ICD-10-CM

## 2022-10-12 DIAGNOSIS — I10 HYPERTENSION, ESSENTIAL: ICD-10-CM

## 2022-10-12 DIAGNOSIS — Z23 ENCOUNTER FOR IMMUNIZATION: ICD-10-CM

## 2022-10-12 DIAGNOSIS — E53.8 VITAMIN B12 DEFICIENCY: ICD-10-CM

## 2022-10-12 DIAGNOSIS — N18.30 CKD STAGE 3 DUE TO TYPE 2 DIABETES MELLITUS (HCC): ICD-10-CM

## 2022-10-12 LAB
25(OH)D3 SERPL-MCNC: 15.8 NG/ML (ref 30–100)
ALBUMIN SERPL-MCNC: 4.5 G/DL (ref 3.5–5)
ALBUMIN/GLOB SERPL: 1.1 {RATIO} (ref 1.1–2.2)
ALP SERPL-CCNC: 76 U/L (ref 45–117)
ALT SERPL-CCNC: 27 U/L (ref 12–78)
ANION GAP SERPL CALC-SCNC: 5 MMOL/L (ref 5–15)
AST SERPL-CCNC: 17 U/L (ref 15–37)
BASOPHILS # BLD: 0.1 K/UL (ref 0–0.1)
BASOPHILS NFR BLD: 1 % (ref 0–1)
BILIRUB SERPL-MCNC: 0.4 MG/DL (ref 0.2–1)
BUN SERPL-MCNC: 31 MG/DL (ref 6–20)
BUN/CREAT SERPL: 20 (ref 12–20)
CALCIUM SERPL-MCNC: 10.2 MG/DL (ref 8.5–10.1)
CHLORIDE SERPL-SCNC: 105 MMOL/L (ref 97–108)
CHOLEST SERPL-MCNC: 143 MG/DL
CO2 SERPL-SCNC: 28 MMOL/L (ref 21–32)
CREAT SERPL-MCNC: 1.55 MG/DL (ref 0.7–1.3)
DIFFERENTIAL METHOD BLD: ABNORMAL
EOSINOPHIL # BLD: 0.3 K/UL (ref 0–0.4)
EOSINOPHIL NFR BLD: 5 % (ref 0–7)
ERYTHROCYTE [DISTWIDTH] IN BLOOD BY AUTOMATED COUNT: 17.1 % (ref 11.5–14.5)
EST. AVERAGE GLUCOSE BLD GHB EST-MCNC: 154 MG/DL
GLOBULIN SER CALC-MCNC: 4.1 G/DL (ref 2–4)
GLUCOSE SERPL-MCNC: 78 MG/DL (ref 65–100)
HBA1C MFR BLD: 7 % (ref 4–5.6)
HCT VFR BLD AUTO: 39.4 % (ref 36.6–50.3)
HDLC SERPL-MCNC: 49 MG/DL
HDLC SERPL: 2.9 {RATIO} (ref 0–5)
HGB BLD-MCNC: 11.2 G/DL (ref 12.1–17)
IMM GRANULOCYTES # BLD AUTO: 0 K/UL (ref 0–0.04)
IMM GRANULOCYTES NFR BLD AUTO: 0 % (ref 0–0.5)
LDLC SERPL CALC-MCNC: 41.4 MG/DL (ref 0–100)
LYMPHOCYTES # BLD: 1 K/UL (ref 0.8–3.5)
LYMPHOCYTES NFR BLD: 16 % (ref 12–49)
MCH RBC QN AUTO: 23.6 PG (ref 26–34)
MCHC RBC AUTO-ENTMCNC: 28.4 G/DL (ref 30–36.5)
MCV RBC AUTO: 82.9 FL (ref 80–99)
MONOCYTES # BLD: 0.6 K/UL (ref 0–1)
MONOCYTES NFR BLD: 9 % (ref 5–13)
NEUTS SEG # BLD: 4.3 K/UL (ref 1.8–8)
NEUTS SEG NFR BLD: 69 % (ref 32–75)
NRBC # BLD: 0 K/UL (ref 0–0.01)
NRBC BLD-RTO: 0 PER 100 WBC
PLATELET # BLD AUTO: 233 K/UL (ref 150–400)
PMV BLD AUTO: 11.9 FL (ref 8.9–12.9)
POTASSIUM SERPL-SCNC: 4.7 MMOL/L (ref 3.5–5.1)
PROT SERPL-MCNC: 8.6 G/DL (ref 6.4–8.2)
RBC # BLD AUTO: 4.75 M/UL (ref 4.1–5.7)
RBC MORPH BLD: ABNORMAL
RBC MORPH BLD: ABNORMAL
SODIUM SERPL-SCNC: 138 MMOL/L (ref 136–145)
TRIGL SERPL-MCNC: 263 MG/DL (ref ?–150)
TSH SERPL DL<=0.05 MIU/L-ACNC: 5.39 UIU/ML (ref 0.36–3.74)
VIT B12 SERPL-MCNC: 1930 PG/ML (ref 193–986)
VLDLC SERPL CALC-MCNC: 52.6 MG/DL
WBC # BLD AUTO: 6.3 K/UL (ref 4.1–11.1)

## 2022-10-12 PROCEDURE — 99214 OFFICE O/P EST MOD 30 MIN: CPT | Performed by: FAMILY MEDICINE

## 2022-10-12 PROCEDURE — 90694 VACC AIIV4 NO PRSRV 0.5ML IM: CPT | Performed by: FAMILY MEDICINE

## 2022-10-12 PROCEDURE — 1123F ACP DISCUSS/DSCN MKR DOCD: CPT | Performed by: FAMILY MEDICINE

## 2022-10-12 PROCEDURE — 3046F HEMOGLOBIN A1C LEVEL >9.0%: CPT | Performed by: FAMILY MEDICINE

## 2022-10-12 PROCEDURE — G8752 SYS BP LESS 140: HCPCS | Performed by: FAMILY MEDICINE

## 2022-10-12 PROCEDURE — 3017F COLORECTAL CA SCREEN DOC REV: CPT | Performed by: FAMILY MEDICINE

## 2022-10-12 PROCEDURE — 2022F DILAT RTA XM EVC RTNOPTHY: CPT | Performed by: FAMILY MEDICINE

## 2022-10-12 PROCEDURE — G8754 DIAS BP LESS 90: HCPCS | Performed by: FAMILY MEDICINE

## 2022-10-12 PROCEDURE — G0008 ADMIN INFLUENZA VIRUS VAC: HCPCS | Performed by: FAMILY MEDICINE

## 2022-10-12 PROCEDURE — 1101F PT FALLS ASSESS-DOCD LE1/YR: CPT | Performed by: FAMILY MEDICINE

## 2022-10-12 PROCEDURE — G8420 CALC BMI NORM PARAMETERS: HCPCS | Performed by: FAMILY MEDICINE

## 2022-10-12 PROCEDURE — G8510 SCR DEP NEG, NO PLAN REQD: HCPCS | Performed by: FAMILY MEDICINE

## 2022-10-12 PROCEDURE — G8536 NO DOC ELDER MAL SCRN: HCPCS | Performed by: FAMILY MEDICINE

## 2022-10-12 PROCEDURE — G8427 DOCREV CUR MEDS BY ELIG CLIN: HCPCS | Performed by: FAMILY MEDICINE

## 2022-10-12 NOTE — PROGRESS NOTES
HISTORY OF PRESENT ILLNESS  Rock Galvin is a 71 y.o. male. Patient was seen today for 3 months follow-up appointment on uncontrolled diabetes, hypertension, CAD, CKD, dyslipidemia. His carvedilol was discontinued by cardiology due to fatigue. He is scheduled for nuclear stress test due to his persistent symptoms. Will do blood work to rule out underlying medical conditions for fatigue including anemia and vitamin B12 deficiency. HPI  Cardiovascular Review  The patient has hypertension, hyperlipidemia, coronary artery disease, status post coronary artery stenting and had 2 vessel PCI on 11/11/2016, s/p CABG x 3 on 07/09/2018 and now three-vessel PCI on 02/28/2020. Tiffany Yanggotariq He reports taking medications as instructed, no medication side effects noted, notes stable dyspnea on exertion, no change, no swelling of ankles, no orthostatic dizziness or lightheadedness, no orthopnea or paroxysmal nocturnal dyspnea, no palpitations, no intermittent claudication symptoms. Diet and Lifestyle: generally follows a low fat low cholesterol diet, generally follows a low sodium diet, nonsmoker. Lab review: labs reviewed and discussed with patient. Britany Jinny  Had echocardiogram and nuclear cardiac stress test on March 15, 2021. Stress test was significant for  Myocardial perfusion imaging defect 1: There is a defect that is medium in size present in the apical and inferolateral location(s) that is reversible. The defect appears to be ischemia. Perfusion defect was visually and quantitatively present. Myocardial perfusion imaging defect 2: There is a defect that is medium in size affecting the mid and inferolateral location(s) that is non-reversible. The defect appears to be infarction. He had left heart catheterization with coronary angiogram on 09/08/21 that showed significant three-vessel disease.   2 out of 3 grafts patent with occluded SVG to OM   He had successful PCI of distal left main, proximal circumflex, proximal OM 2 on 02/28/2022. He is on dual antiplatelet now. His symptoms of fatigue and dyspnea has improved significantly since current intervention. Medicines:ASA, Plavix 75 mg  Hctz 25 mg, rosuvastatin 20 mg,Lasix 20 mg and Imdur 30 mg ER . His candesartan is still on hold        Endocrine Review  He is seen for diabetes. Since last visit he reports: no polyuria or polydipsia, no chest pain, dyspnea or TIA's, no numbness, tingling or pain in extremities, no unusual visual symptoms, weight has decreased, no significant changes. Home glucose monitoring: is performed sporadically, nonfasting values range 160-200  He is checking his sugars one per day. He reports medication compliance: compliant all of the time. Medication side effects: none. Diabetic diet compliance: noncompliant some of the time. Lab review: labs reviewed and discussed with patient. Eye exam: UTD. On  Glipizide 5 mg BID (was decreased after last blood work report )and Jardiance 25 mg, Januvia 50 mg, Metformin 1 gm bid. CKD:  Stage of Chronic Kidney Disease III exhibited by:  Symptoms:none  Lab Results   Component Value Date/Time    Sodium 138 06/23/2022 09:40 AM    Potassium 4.1 06/23/2022 09:40 AM    Chloride 102 06/23/2022 09:40 AM    CO2 29 06/23/2022 09:40 AM    Anion gap 7 06/23/2022 09:40 AM    Glucose 140 (H) 06/23/2022 09:40 AM    BUN 29 (H) 06/23/2022 09:40 AM    Creatinine 1.78 (H) 06/23/2022 09:40 AM    BUN/Creatinine ratio 16 06/23/2022 09:40 AM    GFR est AA 46 (L) 06/23/2022 09:40 AM    GFR est non-AA 38 (L) 06/23/2022 09:40 AM    Calcium 10.0 06/23/2022 09:40 AM       Was referred to nephrologist Dr Brennen Montenegro, was recommended to change HCTZ 50 mg to 25 mg and to add Lasix 20 mg hyperkalemia.,   his candesartan is on hold due to TANNER and hyperkalemia. Review of Systems   Constitutional:  Negative for chills, fever and malaise/fatigue. HENT:  Negative for congestion, ear pain, sore throat and tinnitus.     Eyes: Negative for blurred vision, double vision, pain and discharge. Respiratory:  Negative for cough, shortness of breath and wheezing. Cardiovascular:  Negative for chest pain, palpitations and leg swelling. Gastrointestinal:  Negative for abdominal pain, blood in stool, constipation, diarrhea, nausea and vomiting. Genitourinary:  Negative for dysuria, frequency, hematuria and urgency. Musculoskeletal:  Negative for back pain, joint pain and myalgias. Skin:  Negative for rash. Neurological:  Negative for dizziness, tremors, seizures and headaches. Endo/Heme/Allergies:  Negative for polydipsia. Does not bruise/bleed easily. Psychiatric/Behavioral:  Negative for depression and substance abuse. The patient is not nervous/anxious. Physical Exam  Vitals and nursing note reviewed. Constitutional:       Appearance: He is well-developed. He is not diaphoretic. HENT:      Head: Normocephalic and atraumatic. Right Ear: External ear normal.      Mouth/Throat:      Mouth: Mucous membranes are moist.      Pharynx: No oropharyngeal exudate. Eyes:      General: No scleral icterus. Conjunctiva/sclera: Conjunctivae normal.      Pupils: Pupils are equal, round, and reactive to light. Neck:      Thyroid: No thyromegaly. Vascular: No JVD. Cardiovascular:      Rate and Rhythm: Normal rate and regular rhythm. Heart sounds: Normal heart sounds. No murmur heard. Pulmonary:      Effort: Pulmonary effort is normal.      Breath sounds: Normal breath sounds. No wheezing. Abdominal:      General: Bowel sounds are normal. There is no distension. Palpations: Abdomen is soft. There is no mass. Musculoskeletal:         General: No tenderness. Normal range of motion. Cervical back: Normal range of motion and neck supple. Lymphadenopathy:      Cervical: No cervical adenopathy. Skin:     General: Skin is warm and dry. Findings: No rash.    Neurological:      Mental Status: He is alert and oriented to person, place, and time. Cranial Nerves: No cranial nerve deficit. Deep Tendon Reflexes: Reflexes are normal and symmetric. Reflexes normal.   Psychiatric:         Mood and Affect: Mood normal.         Behavior: Behavior normal.       ASSESSMENT and PLAN  Diagnoses and all orders for this visit:    1. Type 2 diabetes mellitus with hyperglycemia, without long-term current use of insulin ([facility]  Assessment & Plan:   uncontrolled, continue current medications pending work up below, medication adherence emphasized, lifestyle modifications recommended    Orders:  -     LIPID PANEL; Future  -     HEMOGLOBIN A1C WITH EAG; Future  -     METABOLIC PANEL, COMPREHENSIVE; Future    2. Encounter for immunization  -     INFLUENZA, FLUAD, (AGE 65 Y+), IM, PF, 0.5 ML    3. CKD stage 3 due to type 2 diabetes mellitus ([facility]  Assessment & Plan:   borderline controlled, continue current medications, continue current treatment plan, medication adherence emphasized   [address] [facility] [address] [facility], Dr. Jeanie Boland    Orders:  -     Tiago Fennel, COMPREHENSIVE; Future  -     CBC WITH AUTOMATED DIFF; Future    4. Nonrheumatic aortic valve stenosis  -     LIPID PANEL; Future    5. Hypertension, essential  -     METABOLIC PANEL, COMPREHENSIVE; Future    6. S/P CABG x 3  -     LIPID PANEL; Future    7. Dyslipidemia, goal LDL below 70  -     LIPID PANEL; Future  -     TSH 3RD GENERATION; Future  -     METABOLIC PANEL, COMPREHENSIVE; Future    8. Vitamin B12 deficiency  -     VITAMIN B12; Future    9. Vitamin D deficiency  -     VITAMIN D, 25 HYDROXY; Future  Discussed lifestyle issues and health guidance given  Patient was given an after visit summary which includes diagnoses, vital signs, current medications, instructions and references & authorized prescriptions . Results of labs will be conveyed to patient, once available.   Pt verbalized instructions I provided and expressed understanding of discussion that was held today.  Follow-up and Dispositions    Return in about 4 months (around 2/12/2023) for medicare wellness, fasting, physical.         Please note that this dictation was completed with [employer], the computer voice recognition software. Quite often unanticipated grammatical, syntax, homophones, and other interpretive errors are inadvertently transcribed by the computer software. Please disregard these errors. Please excuse any errors that have escaped final proofreading. Thank you.

## 2022-10-12 NOTE — PROGRESS NOTES
Chief Complaint   Patient presents with    Diabetes     Fasting     Cholesterol Problem    Hypertension    Immunization/Injection     INFLUENZA HIGH DOSE      1. Have you been to the ER, urgent care clinic since your last visit? Hospitalized since your last visit? No    2. Have you seen or consulted any other health care providers outside of the [address] [place] [address] since your last visit? Include any pap smears or colon screening. Patrica    Bang Vanegas  is a 71 y.o.  male  who present for INFLUENZA HIGH DOSE  immunizations/injections. He/she denies any symptoms , reactions or allergies that would exclude them from being immunized today. Risks and adverse reactions were discussed and the VIS was given if applicable to them. All questions were addressed. He/She was observed for 10 min post injection. There were no reactions observed.     Regino Lu LPN

## 2022-10-12 NOTE — ASSESSMENT & PLAN NOTE
borderline controlled, continue current medications, continue current treatment plan, medication adherence emphasized   [address] [facility], Dr. Sprague Odor

## 2022-10-14 RX ORDER — ERGOCALCIFEROL 1.25 MG/1
50000 CAPSULE ORAL
Qty: 12 CAPSULE | Refills: 0 | Status: SHIPPED | OUTPATIENT
Start: 2022-10-14

## 2022-10-14 NOTE — PROGRESS NOTES
Nancy Yanez,  Reviewed your results. CBC, blood count shows low hemoglobin but definitely has improved from last time. Vitamin B12 level is high. Vitamin D is significantly low, I resent prescription for high-dose vitamin D that you can take once a week. Kidney function has actually improved from your previous results. Average sugar A1c also shows significant improvement since we adjusted dose of medication. Thyroid function is very borderline but will not start you on any medication at this point. Cholesterol results are showing total and bad cholesterol\". Improvement in good cholesterol but your triglycerides still stay high. Other than vitamin D deficiency, no other reason to justify for your fatigue.   Hopefully we can get answer from upcoming stress test.  Thanks

## 2022-10-17 NOTE — PROGRESS NOTES
Results discussed with patient by Dr. Ranjan Luna via [employer]. Patient has reviewed results.   Cassandra Baca LPN

## 2022-10-23 ENCOUNTER — PATIENT MESSAGE (OUTPATIENT)
Dept: FAMILY MEDICINE CLINIC | Age: 72
End: 2022-10-23

## 2022-10-23 DIAGNOSIS — I73.9 PAD (PERIPHERAL ARTERY DISEASE) (HCC): Primary | ICD-10-CM

## 2022-10-23 DIAGNOSIS — I73.9 CLAUDICATION OF BOTH LOWER EXTREMITIES (HCC): ICD-10-CM

## 2022-10-24 NOTE — TELEPHONE ENCOUNTER
From: Sadie Kendall  To: Christofer Pryor MD  Sent: 10/23/2022 8:27 PM EDT  Subject: leg pain    Hi Dr. Sonja Soni  I have been getting very bad leg pain and it feels like my legs are jammed the pain is so bad I feel like I am going to pass out, this pain happens mostly at night time. It is a very different pain than cramps, it feels like somebody is using a plyer and squeezing my legs such an unbearable pain. Also my body does not fell like it is   functioning very well.  What are your thoughts for this situation  Thank Raúl Mckeon

## 2022-10-27 ENCOUNTER — ANCILLARY PROCEDURE (OUTPATIENT)
Dept: CARDIOLOGY CLINIC | Age: 72
End: 2022-10-27
Payer: MEDICARE

## 2022-10-27 ENCOUNTER — ANCILLARY PROCEDURE (OUTPATIENT)
Dept: CARDIOLOGY CLINIC | Age: 72
End: 2022-10-27

## 2022-10-27 VITALS
WEIGHT: 134 LBS | DIASTOLIC BLOOD PRESSURE: 74 MMHG | HEIGHT: 69 IN | SYSTOLIC BLOOD PRESSURE: 132 MMHG | BODY MASS INDEX: 19.85 KG/M2

## 2022-10-27 VITALS — WEIGHT: 134 LBS | BODY MASS INDEX: 19.85 KG/M2 | HEIGHT: 69 IN

## 2022-10-27 DIAGNOSIS — I25.10 CORONARY ARTERY DISEASE INVOLVING NATIVE CORONARY ARTERY OF NATIVE HEART WITHOUT ANGINA PECTORIS: ICD-10-CM

## 2022-10-27 DIAGNOSIS — R53.83 FATIGUE, UNSPECIFIED TYPE: ICD-10-CM

## 2022-10-27 LAB
ECHO AO ASC DIAM: 2.6 CM
ECHO AO ASCENDING AORTA INDEX: 1.49 CM/M2
ECHO AO ROOT DIAM: 2.8 CM
ECHO AO ROOT INDEX: 1.61 CM/M2
ECHO AR MAX VEL PISA: 3.2 M/S
ECHO AV AREA PEAK VELOCITY: 1.4 CM2
ECHO AV AREA VTI: 1.5 CM2
ECHO AV AREA/BSA PEAK VELOCITY: 0.8 CM2/M2
ECHO AV AREA/BSA VTI: 0.9 CM2/M2
ECHO AV MEAN GRADIENT: 7 MMHG
ECHO AV MEAN VELOCITY: 1.3 M/S
ECHO AV PEAK GRADIENT: 13 MMHG
ECHO AV PEAK VELOCITY: 1.8 M/S
ECHO AV REGURGITANT PHT: 173.7 MILLISECOND
ECHO AV VELOCITY RATIO: 0.5
ECHO AV VTI: 33.9 CM
ECHO EST RA PRESSURE: 3 MMHG
ECHO LA DIAMETER INDEX: 2.53 CM/M2
ECHO LA DIAMETER: 4.4 CM
ECHO LA TO AORTIC ROOT RATIO: 1.57
ECHO LA VOL 2C: 46 ML (ref 18–58)
ECHO LA VOL 4C: 60 ML (ref 18–58)
ECHO LA VOL BP: 53 ML (ref 18–58)
ECHO LA VOL/BSA BIPLANE: 30 ML/M2 (ref 16–34)
ECHO LA VOLUME AREA LENGTH: 53 ML
ECHO LA VOLUME INDEX A2C: 26 ML/M2 (ref 16–34)
ECHO LA VOLUME INDEX A4C: 34 ML/M2 (ref 16–34)
ECHO LA VOLUME INDEX AREA LENGTH: 30 ML/M2 (ref 16–34)
ECHO LV E' LATERAL VELOCITY: 6 CM/S
ECHO LV E' SEPTAL VELOCITY: 5 CM/S
ECHO LV EDV A2C: 68 ML
ECHO LV EDV A4C: 75 ML
ECHO LV EDV BP: 75 ML (ref 67–155)
ECHO LV EDV INDEX A4C: 43 ML/M2
ECHO LV EDV INDEX BP: 43 ML/M2
ECHO LV EDV NDEX A2C: 39 ML/M2
ECHO LV EJECTION FRACTION A2C: 58 %
ECHO LV EJECTION FRACTION A4C: 44 %
ECHO LV EJECTION FRACTION BIPLANE: 53 % (ref 55–100)
ECHO LV ESV A2C: 28 ML
ECHO LV ESV A4C: 42 ML
ECHO LV ESV BP: 35 ML (ref 22–58)
ECHO LV ESV INDEX A2C: 16 ML/M2
ECHO LV ESV INDEX A4C: 24 ML/M2
ECHO LV ESV INDEX BP: 20 ML/M2
ECHO LV FRACTIONAL SHORTENING: 26 % (ref 28–44)
ECHO LV INTERNAL DIMENSION DIASTOLE INDEX: 2.64 CM/M2
ECHO LV INTERNAL DIMENSION DIASTOLIC: 4.6 CM (ref 4.2–5.9)
ECHO LV INTERNAL DIMENSION SYSTOLIC INDEX: 1.95 CM/M2
ECHO LV INTERNAL DIMENSION SYSTOLIC: 3.4 CM
ECHO LV IVSD: 0.9 CM (ref 0.6–1)
ECHO LV MASS 2D: 192.9 G (ref 88–224)
ECHO LV MASS INDEX 2D: 110.9 G/M2 (ref 49–115)
ECHO LV POSTERIOR WALL DIASTOLIC: 1.4 CM (ref 0.6–1)
ECHO LV RELATIVE WALL THICKNESS RATIO: 0.61
ECHO LVOT AREA: 2.8 CM2
ECHO LVOT AV VTI INDEX: 0.55
ECHO LVOT DIAM: 1.9 CM
ECHO LVOT MEAN GRADIENT: 2 MMHG
ECHO LVOT PEAK GRADIENT: 3 MMHG
ECHO LVOT PEAK VELOCITY: 0.9 M/S
ECHO LVOT STROKE VOLUME INDEX: 30.3 ML/M2
ECHO LVOT SV: 52.7 ML
ECHO LVOT VTI: 18.6 CM
ECHO MV A VELOCITY: 1.38 M/S
ECHO MV E DECELERATION TIME (DT): 132.4 MS
ECHO MV E VELOCITY: 1.25 M/S
ECHO MV E/A RATIO: 0.91
ECHO MV E/E' LATERAL: 20.83
ECHO MV E/E' RATIO (AVERAGED): 22.92
ECHO MV E/E' SEPTAL: 25
ECHO PULMONARY ARTERY END DIASTOLIC PRESSURE: 8 MMHG
ECHO PV MAX VELOCITY: 1 M/S
ECHO PV PEAK GRADIENT: 4 MMHG
ECHO PV REGURGITANT MAX VELOCITY: 1.4 M/S
ECHO RIGHT VENTRICULAR SYSTOLIC PRESSURE (RVSP): 37 MMHG
ECHO RV TAPSE: 1.2 CM (ref 1.7–?)
ECHO TV REGURGITANT MAX VELOCITY: 2.91 M/S
ECHO TV REGURGITANT PEAK GRADIENT: 34 MMHG
NUC STRESS EJECTION FRACTION: 39 %
STRESS BASELINE DIAS BP: 74 MMHG
STRESS BASELINE HR: 98 BPM
STRESS BASELINE ST DEPRESSION: 0.5 MM
STRESS BASELINE SYS BP: 132 MMHG
STRESS O2 SAT PEAK: 100 %
STRESS O2 SAT REST: 96 %
STRESS PEAK DIAS BP: 50 MMHG
STRESS PEAK SYS BP: 108 MMHG
STRESS PERCENT HR ACHIEVED: 79 %
STRESS POST PEAK HR: 117 BPM
STRESS RATE PRESSURE PRODUCT: NORMAL BPM*MMHG
STRESS ST DEPRESSION: 0 MM
STRESS TARGET HR: 149 BPM

## 2022-10-27 PROCEDURE — 78452 HT MUSCLE IMAGE SPECT MULT: CPT | Performed by: SPECIALIST

## 2022-10-27 PROCEDURE — 93015 CV STRESS TEST SUPVJ I&R: CPT | Performed by: SPECIALIST

## 2022-10-27 PROCEDURE — 93306 TTE W/DOPPLER COMPLETE: CPT | Performed by: SPECIALIST

## 2022-10-27 PROCEDURE — A9500 TC99M SESTAMIBI: HCPCS | Performed by: SPECIALIST

## 2022-10-27 RX ORDER — TETRAKIS(2-METHOXYISOBUTYLISOCYANIDE)COPPER(I) TETRAFLUOROBORATE 1 MG/ML
10 INJECTION, POWDER, LYOPHILIZED, FOR SOLUTION INTRAVENOUS ONCE
Status: COMPLETED | OUTPATIENT
Start: 2022-10-27 | End: 2022-10-27

## 2022-10-27 RX ORDER — TETRAKIS(2-METHOXYISOBUTYLISOCYANIDE)COPPER(I) TETRAFLUOROBORATE 1 MG/ML
30 INJECTION, POWDER, LYOPHILIZED, FOR SOLUTION INTRAVENOUS ONCE
Status: COMPLETED | OUTPATIENT
Start: 2022-10-27 | End: 2022-10-27

## 2022-10-27 RX ADMIN — TETRAKIS(2-METHOXYISOBUTYLISOCYANIDE)COPPER(I) TETRAFLUOROBORATE 25.5 MILLICURIE: 1 INJECTION, POWDER, LYOPHILIZED, FOR SOLUTION INTRAVENOUS at 11:15

## 2022-10-27 RX ADMIN — TETRAKIS(2-METHOXYISOBUTYLISOCYANIDE)COPPER(I) TETRAFLUOROBORATE 8.6 MILLICURIE: 1 INJECTION, POWDER, LYOPHILIZED, FOR SOLUTION INTRAVENOUS at 09:55

## 2022-10-31 DIAGNOSIS — E11.65 TYPE 2 DIABETES MELLITUS WITH HYPERGLYCEMIA, WITHOUT LONG-TERM CURRENT USE OF INSULIN (HCC): ICD-10-CM

## 2022-10-31 RX ORDER — EMPAGLIFLOZIN 25 MG/1
TABLET, FILM COATED ORAL
Qty: 90 TABLET | Refills: 0 | Status: SHIPPED | OUTPATIENT
Start: 2022-10-31

## 2022-11-02 NOTE — PROGRESS NOTES
Upcoming visit to review results  Future Appointments  11/7/2022  3:00 PM    VASCULAR ROOM 2 [address]  11/21/2022 3:40 PM    MD [facility]  2/14/2023  8:40 AM    MD [facility] [place]                BS AMB

## 2022-11-07 ENCOUNTER — HOSPITAL ENCOUNTER (OUTPATIENT)
Dept: VASCULAR SURGERY | Age: 72
Discharge: HOME | End: 2022-11-07
Attending: FAMILY MEDICINE
Payer: MEDICARE

## 2022-11-07 DIAGNOSIS — I73.9 CLAUDICATION OF BOTH LOWER EXTREMITIES (HCC): ICD-10-CM

## 2022-11-07 PROCEDURE — 93925 LOWER EXTREMITY STUDY: CPT

## 2022-11-08 ENCOUNTER — TELEPHONE (OUTPATIENT)
Dept: CARDIOLOGY CLINIC | Age: 72
End: 2022-11-08

## 2022-11-08 LAB
LEFT ABI: 0.98
LEFT ARM BP: 164 MMHG
LEFT CFA PROX SYS PSV: 117.6 CM/S
LEFT DIST ATA VELOCITY: 45.3 CM/S
LEFT DIST PTA PSV: 81.9 CM/S
LEFT POP A PROX VEL RATIO: 0.75
LEFT POPLITEAL DIST SYS PSV: 65.6 CM/S
LEFT POPLITEAL PROX SYS PSV: 87.3 CM/S
LEFT POSTERIOR TIBIAL: 160 MMHG
LEFT PROX PFA A PSV: 107.2 CM/S
LEFT SFA DIST VEL RATIO: 1.26
LEFT SFA MID VEL RATIO: 1
LEFT SFA PROX VEL RATIO: 0.78
LEFT SUPER FEMORAL DIST SYS PSV: 115.8 CM/S
LEFT SUPER FEMORAL MID SYS PSV: 91.8 CM/S
LEFT SUPER FEMORAL PROX SYS PSV: 91.8 CM/S
RIGHT ABI: 0.62
RIGHT ARM BP: 104 MMHG
RIGHT CFA PROX SYS PSV: 131.9 CM/S
RIGHT DIST ATA VELOCITY: 18 CM/S
RIGHT DIST PTA PSV: 49.9 CM/S
RIGHT POP A PROX VEL RATIO: 0.47
RIGHT POPLITEAL DIST SYS PSV: 101.5 CM/S
RIGHT POPLITEAL PROX SYS PSV: 65.8 CM/S
RIGHT POSTERIOR TIBIAL: 102 MMHG
RIGHT PROX PFA A PSV: 91.8 CM/S
RIGHT SFA DIST VEL RATIO: 0.29
RIGHT SFA MID VEL RATIO: 8.1
RIGHT SFA PROX VEL RATIO: 0.4
RIGHT SUPER FEMORAL DIST SYS PSV: 139.4 CM/S
RIGHT SUPER FEMORAL MID SYS PSV: 474.4 CM/S
RIGHT SUPER FEMORAL PROX SYS PSV: 58.5 CM/S

## 2022-11-08 NOTE — TELEPHONE ENCOUNTER
Spouse is requesting a call back, regarding the appointment with Dr. Luna Andrade.          LQFBX:[phone]

## 2022-11-09 NOTE — PROGRESS NOTES
Called, spoke to pt. Two pt identifiers confirmed. Patient seen in [facility] Chart but writer call to update and make sure patient was aware of recommendation. Pt  and wife verbalized understanding of information discussed w/ no further questions at this time.

## 2022-11-09 NOTE — PROGRESS NOTES
Ryanne Dickinson,  I have reviewed your results for arterial Doppler. It shows significant and severe stenosis of the lower extremity arteries that is the reason you are having pain in both your legs. You will need to follow-up with vascular surgeon for further management. · Severe stenosis of the right mid superficial femoral artery. Near 50% stenosis of the right distal superficial femoral artery. · Moderate stenosis of the right distal popliteal artery. 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 · Consistent with less than 50% stenosis of the arteries in the left lower extremity. 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 Please call vascular surgery office to schedule an appointment.   Their main office is only [address]  [facility] [address]   Phone:   [phone]    Thanks

## 2022-11-11 NOTE — TELEPHONE ENCOUNTER
Verified patient with two types of identifiers. Moved OV up per wife's request as they will be out of town for the holidays. Confirmed date, time, location. She also states Mr. Kendall will be seeing Dr. Juan Coello on Monday.  Will request records for Dr. Mehdi Maharaj.    Future Appointments   Date Time Provider Mina Mercadoi   11/16/2022  4:20 PM MD [facility] AMB   2/14/2023  8:40 AM Luis Coleman MD [facility] AMB

## 2022-11-16 ENCOUNTER — OFFICE VISIT (OUTPATIENT)
Dept: CARDIOLOGY CLINIC | Age: 72
End: 2022-11-16
Payer: MEDICARE

## 2022-11-16 VITALS
SYSTOLIC BLOOD PRESSURE: 120 MMHG | DIASTOLIC BLOOD PRESSURE: 70 MMHG | HEIGHT: 69 IN | RESPIRATION RATE: 16 BRPM | WEIGHT: 135 LBS | HEART RATE: 107 BPM | BODY MASS INDEX: 19.99 KG/M2 | OXYGEN SATURATION: 99 %

## 2022-11-16 DIAGNOSIS — Z95.1 S/P CABG X 3: ICD-10-CM

## 2022-11-16 DIAGNOSIS — R53.83 FATIGUE, UNSPECIFIED TYPE: ICD-10-CM

## 2022-11-16 DIAGNOSIS — I10 ESSENTIAL HYPERTENSION: ICD-10-CM

## 2022-11-16 DIAGNOSIS — E78.5 DYSLIPIDEMIA: ICD-10-CM

## 2022-11-16 DIAGNOSIS — I35.8 NON-RHEUMATIC AORTIC SCLEROSIS: ICD-10-CM

## 2022-11-16 DIAGNOSIS — Z78.9 STATIN INTOLERANCE: ICD-10-CM

## 2022-11-16 DIAGNOSIS — I25.10 CORONARY ARTERY DISEASE INVOLVING NATIVE CORONARY ARTERY OF NATIVE HEART WITHOUT ANGINA PECTORIS: Primary | ICD-10-CM

## 2022-11-16 RX ORDER — NITROGLYCERIN 0.4 MG/1
0.4 TABLET SUBLINGUAL
Qty: 25 TABLET | Refills: 3 | Status: SHIPPED | OUTPATIENT
Start: 2022-11-16

## 2022-11-16 NOTE — PATIENT INSTRUCTIONS
Please take one nitro as needed for chest pain/tightness, you may take up to three doses every five minutes if the pain is not relieved. Please seek emergency medical attention if the pain is not relived after three doses of nitro and let us know at [phone].

## 2022-11-16 NOTE — PROGRESS NOTES
Neal Kendall     1950       Monik Stein MD, [facility]  Date of Visit-11/16/2022    PCP is Becca Irwin MD   [facility] [place]  [facility]  HPI:  Ora Cloud is a 71 y.o. male   2-month follow-up  CAD with CABG 6/9/2018. NSTEMI in November 2016 and resolved pulmonary HTN. Stent to circumflex an LAD in 2016. Stress echo in 2018 with a drop in BP with exercise and a drop in EF. Cath on 6/22/18 that showed even with a 5F catheter, he dampened with suspected ostial 3 vessel disease. Echo 3/27/2021 = EF 55 to 60% mild AR MR TR LAE MAC  Nuclear 3/18/2021 EF 64% medium size defect apical and inferior lateral reversible ischemia medium defect mid and inferolateral nonreversible appear to be infarction intermediate risk stress test .  PCI 02/28/2022--patent LIMA to LAD, vein graft to distal RCA with severe disease in the native vessels Occluded vein graft to OM 2. Native OM 2 severely diseased along with proximal to mid circumflex as well as distal left main. Overall the vessel is significantly remodeled negatively. Additionally there is significant disease in the first marginal branch which is even smaller as well as AV groove branch right at the takeoff of OM2. Single stent 2.25 x 28 mm Xience was placed extending from the OM 2 into the circumflex into the distal left main and sequentially dilated with 2.25 noncompliant, 2 5 noncompliant, 3 oh noncompliant and 3 5 noncompliant to perform multilevel POT to manage multiple bifurcations on the way. Atherectomy was considered but given small size of the vessels, was not deemed to be absolutely safe for the obtuse marginal lesion. Overall stent expanded fairly well related to the size of the vessels. Normal LVEDP    Today. Seen 2 months ago when he was getting tired at work but not any shortness of breath or chest pain. Occasional dizziness with systolic murmur.   Our plan was to stop his Coreg to see if this contributed to his fatigue. Get lab work and then if he was not improving get stress testing and echo. Nuclear stress October 27, 2022 test showed ischemia similar to 3/15/2021 with a medium size defect in the mid to distal inferolateral and anterolateral segments in the circumflex territory he had septal dyskinesia with an EF of 39%. He had a fixed apical infarct  Echocardiogram October 27, 2022 showed an EF of 45 to 50% TAPSE at 1.2 showing reduced RV function sclerosis of the aortic valve with stenosis that was very mild with a valve area of mean of 7 mmHg and HUY of 1.4 cm². The mitral was thickened with restricted mobility and mild MR. Lab work in October 12, 2022 showed hemoglobin 11.2, creatinine 1.55 protein 8.6 triglycerides 263 cholesterol 143 HDL 49 LDL 41. Today he denies any symptoms but then he says for once every week he will get a pain lasting 5 to 10 minutes. He has an unchanged murmur. Denies edema, syncope or shortness of breath at rest   Has no tachycardia , palpitations or sense of arrythmia    Assessment/Plan:     Patient Instructions   Please take one nitro as needed for chest pain/tightness, you may take up to three doses every five minutes if the pain is not relieved. Please seek emergency medical attention if the pain is not relived after three doses of nitro and let us know at [phone].      1. Coronary artery disease involving native coronary artery of native heart without angina pectoris  Angina had resolved with latest stenting to OM. Has fatigue unclear if better off Coreg  CABG 2018. cath 9/8/21 open LIMA. PCI to OM2 back up LM on 2/28/2022  Now with fatigue, labs echo and nuke reviewed   Now occ pain went over NTG use, some reversible, if pain worse, then cath    2.  Hypertension, essential  At goal , meds and possible side effects reviewed and patient denies  Key CAD CHF Meds               nitroglycerin (NITROSTAT) 0.4 mg SL tablet (Taking) 1 Tablet by SubLINGual route every five (5) minutes as needed for Chest Pain. Up to 3 doses. furosemide (LASIX) 20 mg tablet (Taking/Discontinued) Take 1 tablet by mouth once daily    hydroCHLOROthiazide (HYDRODIURIL) 25 mg tablet (Taking/Discontinued) Take 1 tablet by mouth once daily    isosorbide mononitrate ER (IMDUR) 30 mg tablet (Taking/Discontinued) Take 1 tablet by mouth once daily    clopidogreL (Plavix) 75 mg tab (Taking) Take 1 Tablet by mouth daily for 360 days. Indications: a sudden worsening of angina called acute coronary syndrome    rosuvastatin (CRESTOR) 20 mg tablet (Taking) Take 1 Tablet by mouth nightly. aspirin delayed-release 81 mg tablet (Taking) Take 1 Tab by mouth. BP Readings from Last 6 Encounters:   11/16/22 120/70   10/27/22 132/74   10/12/22 120/70   09/13/22 100/60   06/23/22 139/71   05/18/22 120/62           3. Dyslipidemia  At goal , denies excess muscle aches or new liver issues  Key Antihyperlipidemia Meds               rosuvastatin (CRESTOR) 20 mg tablet (Taking) Take 1 Tablet by mouth nightly. Lab Results   Component Value Date/Time    LDL, calculated 41.4 10/12/2022 09:10 AM          4. Non-rheumatic aortic sclerosis  03/15/21    ECHO ADULT COMPLETE 03/27/2021 3/27/2021    Interpretation Summary  · LV: Estimated LVEF is 55 - 60%. Biplane method used to measure ejection fraction. Normal cavity size and systolic function (ejection fraction normal). Mild concentric hypertrophy. Wall motion: normal. Mild (grade 1) left ventricular diastolic dysfunction. · AV: Aortic valve leaflet calcification present. Mild aortic valve regurgitation is present. · MV: Mitral valve thickening. Mitral valve leaflet calcification. Moderate mitral annular calcification. Mild mitral valve regurgitation is present. · TV: Mild tricuspid valve regurgitation is present. · PV: Mild pulmonic valve regurgitation is present. · LA: Mildly dilated left atrium.   · PA: Pulmonary arterial systolic pressure is 33 mmHg. Signed by: Javi Trejo MD on 3/27/2021  7:40 PM  5. S/P CABG x 3  6. Statin intolerance     Impression:   1. Coronary artery disease involving native coronary artery of native heart without angina pectoris    2. Essential hypertension    3. Dyslipidemia    4. Non-rheumatic aortic sclerosis    5. S/P CABG x 3    6. Statin intolerance    7. Fatigue, unspecified type       Cardiac History:   Echo 1-15-18 EF 63% 63 %. Mild LAE, MAC, mild AS mean 8, HUY 1.8 mild TR  CABG x 3 7-9-18= LIMA to LAD, SVG-OM, SVG-dRCA   Echo 6-22-18 EF 55-60%, mod MAC, aortic sclerosis without stenosis , mild TR  LISA 6-18-18 4 minutes +moderate hypokinesis of the mid anteroseptal, entire inferior, apical septal, and apical wall(s). With exercise a mild reduction in LV function. PCI 11-11-16 JEREMY to mLAD 95% JEREMY to OM1 90%     Future Appointments   Date Time Provider Mina Delgado   12/5/2022  1:20 PM MD TEZ Patel [facility]   2/1/2023  3:40 PM MD TEZ Patel [facility]   2/14/2023  8:40 AM Tony Lowe MD [facility]        Patient Care Team:  Tony Lowe MD as PCP - General (Family Medicine)  Tony Lowe MD as PCP - [facility]  Javi Trejo MD (Cardiovascular Disease Physician)  Donnell Bacon MD (Gastroenterology)  Juventino Bray MD (Cardiothoracic Surgery)  Deedee Diamond MD (Cardiovascular Disease Physician)    ROS-except as noted above. . A complete cardiac and respiratory are reviewed and negative except as above ; Resp-denies wheezing  or productive cough,.  Const- No unusual weight loss or fever; Neuro-no recent seizure or CVA ; GI- No BRBPR, abdom pain, bloating ; - no  hematuria   see supplement sheet, initialed and to be scanned by staff  Past Medical History:   Diagnosis Date    CAD (coronary artery disease) 11/10/2016    NSTEMI & 2 stents    Deafness 10/28/2012    DM (diabetes mellitus) ([facility] [address]     Elevated cholesterol Hypertension     NSTEMI (non-ST elevated myocardial infarction) ([facility] [address] 11/10/2016      Social Hx= reports that he has never smoked. He has never been exposed to tobacco smoke. He has never used smokeless tobacco. He reports current alcohol use of about 2.0 standard drinks per week. He reports that he does not use drugs. Exam and Labs:  /70   Pulse (!) 107   Resp 16   Ht 5' 9\" (1.753 m)   Wt 135 lb (61.2 kg)   SpO2 99%   BMI 19.94 kg/m² Constitutional:  NAD, comfortable  Head: NC,AT. Eyes: No scleral icterus. Neck:  Neck supple. No JVD present. Throat: moist mucous membranes. Chest: Effort normal & normal respiratory excursion . Neurological: alert, conversant and oriented . Skin: Skin is not cold. No obvious systemic rash noted. Not diaphoretic. No erythema. Psychiatric:  Grossly normal mood and affect. Behavior appears normal. Extremities:  no clubbing or cyanosis. Abdomen: non distended    Lungs:breath sounds normal. No stridor. distress, wheezes or  Rales. Heart:2/6 murmur unchanged normal rate, regular rhythm, normal S1, S2, no murmurs, rubs, clicks or gallops , PMI non displaced. Edema: Edema is none.   Lab Results   Component Value Date/Time    Cholesterol, total 143 10/12/2022 09:10 AM    HDL Cholesterol 49 10/12/2022 09:10 AM    LDL, calculated 41.4 10/12/2022 09:10 AM    Triglyceride 263 (H) 10/12/2022 09:10 AM    CHOL/HDL Ratio 2.9 10/12/2022 09:10 AM     Lab Results   Component Value Date/Time    Sodium 138 10/12/2022 09:10 AM    Potassium 4.7 10/12/2022 09:10 AM    Chloride 105 10/12/2022 09:10 AM    CO2 28 10/12/2022 09:10 AM    Anion gap 5 10/12/2022 09:10 AM    Glucose 78 10/12/2022 09:10 AM    BUN 31 (H) 10/12/2022 09:10 AM    Creatinine 1.55 (H) 10/12/2022 09:10 AM    BUN/Creatinine ratio 20 10/12/2022 09:10 AM    GFR est AA 46 (L) 06/23/2022 09:40 AM    GFR est non-AA 38 (L) 06/23/2022 09:40 AM    Calcium 10.2 (H) 10/12/2022 09:10 AM      Wt Readings from Last 3 Encounters: 11/16/22 135 lb (61.2 kg)   10/27/22 134 lb (60.8 kg)   10/27/22 134 lb (60.8 kg)      BP Readings from Last 3 Encounters:   11/16/22 120/70   10/27/22 132/74   10/12/22 120/70      Current Outpatient Medications   Medication Sig    nitroglycerin (NITROSTAT) 0.4 mg SL tablet 1 Tablet by SubLINGual route every five (5) minutes as needed for Chest Pain. Up to 3 doses. Jardiance 25 mg tablet TAKE 1 TABLET BY MOUTH IN THE MORNING    ergocalciferol (ERGOCALCIFEROL) 1,250 mcg (50,000 unit) capsule Take 1 Capsule by mouth every seven (7) days. Januvia 50 mg tablet Take 1 tablet by mouth once daily    polyethylene glycol (MIRALAX) 17 gram/dose powder Take 17 g by mouth daily. albendazole (ALBENZA) 200 mg tablet TAKE 2 TABLETS BY MOUTH ONCE FOR 1 DOSE. THEN REPEAT IN ONE WEEK    clopidogreL (Plavix) 75 mg tab Take 1 Tablet by mouth daily for 360 days. Indications: a sudden worsening of angina called acute coronary syndrome    rosuvastatin (CRESTOR) 20 mg tablet Take 1 Tablet by mouth nightly. aspirin delayed-release 81 mg tablet Take 1 Tab by mouth. furosemide (LASIX) 20 mg tablet Take 1 Tablet by mouth daily. glipiZIDE (GLUCOTROL) 5 mg tablet Take 1 tablet by mouth twice daily    isosorbide mononitrate ER (IMDUR) 30 mg tablet Take 1 tablet by mouth once daily    metFORMIN (GLUCOPHAGE) 1,000 mg tablet TAKE 1 TABLET BY MOUTH TWICE DAILY WITH MEALS    hydroCHLOROthiazide (HYDRODIURIL) 25 mg tablet Take 1 Tablet by mouth daily. tamsulosin (FLOMAX) 0.4 mg capsule Take 1 capsule by mouth once daily    ipratropium (ATROVENT) 21 mcg (0.03 %) nasal spray 2 Sprays by Both Nostrils route every twelve (12) hours. No current facility-administered medications for this visit. Impression see above.

## 2022-11-21 ENCOUNTER — VIRTUAL VISIT (OUTPATIENT)
Dept: FAMILY MEDICINE CLINIC | Age: 72
End: 2022-11-21
Payer: MEDICARE

## 2022-11-21 DIAGNOSIS — J40 BRONCHITIS: Primary | ICD-10-CM

## 2022-11-21 PROCEDURE — 1123F ACP DISCUSS/DSCN MKR DOCD: CPT | Performed by: FAMILY MEDICINE

## 2022-11-21 PROCEDURE — 99213 OFFICE O/P EST LOW 20 MIN: CPT | Performed by: FAMILY MEDICINE

## 2022-11-21 PROCEDURE — 3017F COLORECTAL CA SCREEN DOC REV: CPT | Performed by: FAMILY MEDICINE

## 2022-11-21 PROCEDURE — G8756 NO BP MEASURE DOC: HCPCS | Performed by: FAMILY MEDICINE

## 2022-11-21 PROCEDURE — G8432 DEP SCR NOT DOC, RNG: HCPCS | Performed by: FAMILY MEDICINE

## 2022-11-21 PROCEDURE — G8427 DOCREV CUR MEDS BY ELIG CLIN: HCPCS | Performed by: FAMILY MEDICINE

## 2022-11-21 PROCEDURE — 1101F PT FALLS ASSESS-DOCD LE1/YR: CPT | Performed by: FAMILY MEDICINE

## 2022-11-21 RX ORDER — IPRATROPIUM BROMIDE 21 UG/1
2 SPRAY, METERED NASAL EVERY 12 HOURS
Qty: 30 ML | Refills: 0 | Status: SHIPPED | OUTPATIENT
Start: 2022-11-21

## 2022-11-21 RX ORDER — BENZONATATE 200 MG/1
200 CAPSULE ORAL
Qty: 20 CAPSULE | Refills: 0 | Status: SHIPPED | OUTPATIENT
Start: 2022-11-21 | End: 2022-11-28

## 2022-11-21 RX ORDER — AZITHROMYCIN 250 MG/1
TABLET, FILM COATED ORAL
Qty: 6 TABLET | Refills: 0 | Status: SHIPPED | OUTPATIENT
Start: 2022-11-21 | End: 2022-11-26

## 2022-11-21 NOTE — PROGRESS NOTES
Kieran Babin is a 71 y.o. male who was seen by synchronous (real-time) audio-video technology on 11/21/2022 for Cough (Dry cough along with chest pain)        Assessment & Plan:   Diagnoses and all orders for this visit:    1. Bronchitis  -     azithromycin (ZITHROMAX) 250 mg tablet; Take 2 tablets today, then take 1 tablet daily  -     benzonatate (TESSALON) 200 mg capsule; Take 1 Capsule by mouth three (3) times daily as needed for Cough for up to 7 days. -     ipratropium (ATROVENT) 21 mcg (0.03 %) nasal spray; 2 Sprays by Both Nostrils route every twelve (12) hours. I spent at least 20 minutes on this visit with this established patient. Explained patient that most likely he has a viral versus allergic bronchitis that should respond very well to Mucinex DM and nasal spray but as patient is traveling and flying tomorrow, gave him prescription of antibiotic to carry and if no improvement with conservative management in 3 to 4 days, to start azithromycin. Patient verbalized my instructions. Subjective:     URI Review  Ny Cotton returns to clinic today to talk about: URI symptoms, sinus congestion, dry cough, and chest congestion that started abrupt and 3 days ago, and is rapidly worsening since that time. He reports cough described as harsh, painful, hoarse and chest hurts while coughing. He denies a history of: fever, rhinorrhea, sore throat, hoarseness, productive cough, wheezing, SOB, and DONALDSON. Treatments have included: decongestants, antihistamines, cough suppressants, OTC products, which have been not very effective. Relevant PMH: DM and CAD. Patient reports sick contacts: no.      Prior to Admission medications    Medication Sig Start Date End Date Taking?  Authorizing Provider   azithromycin (ZITHROMAX) 250 mg tablet Take 2 tablets today, then take 1 tablet daily 11/21/22 11/26/22 Yes Tree Rios MD   benzonatate (TESSALON) 200 mg capsule Take 1 Capsule by mouth three (3) times daily as needed for Cough for up to 7 days. 11/21/22 11/28/22 Yes Katelyn Jackson MD   ipratropium (ATROVENT) 21 mcg (0.03 %) nasal spray 2 Sprays by Both Nostrils route every twelve (12) hours. 11/21/22  Yes Katelyn Jackson MD   nitroglycerin (NITROSTAT) 0.4 mg SL tablet 1 Tablet by SubLINGual route every five (5) minutes as needed for Chest Pain. Up to 3 doses. 11/16/22  Yes Ngoc Sharp MD   Jardiance 25 mg tablet TAKE 1 TABLET BY MOUTH IN THE MORNING 10/31/22  Yes Katelyn Jackson MD   ergocalciferol (ERGOCALCIFEROL) 1,250 mcg (50,000 unit) capsule Take 1 Capsule by mouth every seven (7) days. 10/14/22  Yes Katelyn Jackson MD   tamsulosin [facility]) 0.4 mg capsule Take 1 capsule by mouth once daily 9/23/22  Yes Katelyn Jackson MD   glipiZIDE (GLUCOTROL) 5 mg tablet Take 1 tablet by mouth twice daily 9/23/22  Yes Katelyn Jackson MD   furosemide (LASIX) 20 mg tablet Take 1 tablet by mouth once daily 9/23/22  Yes Katelyn Jackson MD   metFORMIN (GLUCOPHAGE) 1,000 mg tablet TAKE 1 TABLET BY MOUTH TWICE DAILY WITH MEALS 9/23/22  Yes Katelyn Jackson MD   Januvia 50 mg tablet Take 1 tablet by mouth once daily 9/23/22  Yes Katelyn Jackson MD   hydroCHLOROthiazide (HYDRODIURIL) 25 mg tablet Take 1 tablet by mouth once daily 9/12/22  Yes Bri Buchanan MD   polyethylene glycol (MIRALAX) 17 gram/dose powder Take 17 g by mouth daily. 5/31/22  Yes Katelyn Jackson MD   albendazole (ALBENZA) 200 mg tablet TAKE 2 TABLETS BY MOUTH ONCE FOR 1 DOSE. THEN REPEAT IN ONE WEEK 5/26/22  Yes Katelyn Jackson MD   isosorbide mononitrate ER (IMDUR) 30 mg tablet Take 1 tablet by mouth once daily 3/28/22  Yes Katelyn Jackson MD   clopidogreL (Plavix) 75 mg tab Take 1 Tablet by mouth daily for 360 days. Indications: a sudden worsening of angina called acute coronary syndrome 2/28/22 2/23/23 Yes Bridgett Torres MD   rosuvastatin (CRESTOR) 20 mg tablet Take 1 Tablet by mouth nightly.  2/28/22  Yes Bridgett Torres MD   aspirin delayed-release 81 mg tablet Take 1 Tab by mouth. Yes Provider, Historical     Patient Active Problem List    Diagnosis Date Noted    Dyslipidemia, goal LDL below 70 07/30/2017    CKD stage 3 due to type 2 diabetes mellitus ([facility] 01/21/2021    S/P CABG x 3 07/09/2018    Coronary artery disease involving native coronary artery of native heart without angina pectoris 07/30/2017    Hypertension, essential 07/30/2017    Colon cancer screening 05/30/2017    Nonrheumatic aortic valve stenosis 09/21/2016    Bruit of right carotid artery 09/21/2016    Type 2 diabetes mellitus with hyperglycemia ([facility] 05/26/2015    Deafness 10/28/2012    Cramp of limb 03/26/2012     Current Outpatient Medications   Medication Sig Dispense Refill    azithromycin (ZITHROMAX) 250 mg tablet Take 2 tablets today, then take 1 tablet daily 6 Tablet 0    benzonatate (TESSALON) 200 mg capsule Take 1 Capsule by mouth three (3) times daily as needed for Cough for up to 7 days. 20 Capsule 0    ipratropium (ATROVENT) 21 mcg (0.03 %) nasal spray 2 Sprays by Both Nostrils route every twelve (12) hours. 30 mL 0    nitroglycerin (NITROSTAT) 0.4 mg SL tablet 1 Tablet by SubLINGual route every five (5) minutes as needed for Chest Pain. Up to 3 doses. 25 Tablet 3    Jardiance 25 mg tablet TAKE 1 TABLET BY MOUTH IN THE MORNING 90 Tablet 0    ergocalciferol (ERGOCALCIFEROL) 1,250 mcg (50,000 unit) capsule Take 1 Capsule by mouth every seven (7) days.  12 Capsule 0    tamsulosin (FLOMAX) 0.4 mg capsule Take 1 capsule by mouth once daily 60 Capsule 0    glipiZIDE (GLUCOTROL) 5 mg tablet Take 1 tablet by mouth twice daily 180 Tablet 0    furosemide (LASIX) 20 mg tablet Take 1 tablet by mouth once daily 90 Tablet 0    metFORMIN (GLUCOPHAGE) 1,000 mg tablet TAKE 1 TABLET BY MOUTH TWICE DAILY WITH MEALS 180 Tablet 0    Januvia 50 mg tablet Take 1 tablet by mouth once daily 90 Tablet 0    hydroCHLOROthiazide (HYDRODIURIL) 25 mg tablet Take 1 tablet by mouth once daily 90 Tablet 0    polyethylene glycol (MIRALAX) 17 gram/dose powder Take 17 g by mouth daily. 510 g 0    albendazole (ALBENZA) 200 mg tablet TAKE 2 TABLETS BY MOUTH ONCE FOR 1 DOSE. THEN REPEAT IN ONE WEEK 60 Tablet 0    isosorbide mononitrate ER (IMDUR) 30 mg tablet Take 1 tablet by mouth once daily 90 Tablet 0    clopidogreL (Plavix) 75 mg tab Take 1 Tablet by mouth daily for 360 days. Indications: a sudden worsening of angina called acute coronary syndrome 30 Tablet 11    rosuvastatin (CRESTOR) 20 mg tablet Take 1 Tablet by mouth nightly. 90 Tablet 3    aspirin delayed-release 81 mg tablet Take 1 Tab by mouth. No Known Allergies  Past Medical History:   Diagnosis Date    CAD (coronary artery disease) 11/10/2016    NSTEMI & 2 stents    Deafness 10/28/2012    DM (diabetes mellitus) ([facility] [address]     Elevated cholesterol     Hypertension     NSTEMI (non-ST elevated myocardial infarction) ([facility] [address] 11/10/2016     Past Surgical History:   Procedure Laterality Date    COLONOSCOPY N/A 6/28/2018    COLONOSCOPY performed by Andrea Degroot MD at [facility] ENDOSCOPY    [address]  11/11/2016    2 stents     Family History   Problem Relation Age of Onset    Heart Disease Father     Heart Attack Father     Hypertension Mother     Elevated Lipids Brother     Elevated Lipids Brother     No Known Problems Sister     Elevated Lipids Brother     No Known Problems Son     No Known Problems Daughter     Anesth Problems Neg Hx      Social History     Tobacco Use    Smoking status: Never     Passive exposure: Never    Smokeless tobacco: Never   Substance Use Topics    Alcohol use: Yes     Alcohol/week: 2.0 standard drinks     Types: 1 Cans of beer, 1 Shots of liquor per week     Comment: 1 DRINK DAILY       Review of Systems   Constitutional:  Negative for chills, fever and malaise/fatigue. HENT:  Negative for congestion, ear pain, sore throat and tinnitus.     Eyes:  Negative for blurred vision, double vision, pain and discharge. Respiratory:  Positive for cough. Negative for shortness of breath and wheezing. Cardiovascular:  Positive for chest pain. Negative for palpitations and leg swelling. Gastrointestinal:  Negative for abdominal pain, blood in stool, constipation, diarrhea, nausea and vomiting. Genitourinary:  Negative for dysuria, frequency, hematuria and urgency. Musculoskeletal:  Negative for back pain, joint pain and myalgias. Skin:  Negative for rash. Neurological:  Negative for dizziness, tremors, seizures and headaches. Endo/Heme/Allergies:  Negative for polydipsia. Does not bruise/bleed easily. Psychiatric/Behavioral:  Negative for depression and substance abuse. The patient is not nervous/anxious. Objective:   No flowsheet data found.      [INSTRUCTIONS:  \"[x]\" Indicates a positive item  \"[]\" Indicates a negative item  -- DELETE ALL ITEMS NOT EXAMINED]    Constitutional: [x] Appears well-developed and well-nourished [x] No apparent distress      [] Abnormal -     Mental status: [x] Alert and awake  [x] Oriented to person/place/time [x] Able to follow commands    [] Abnormal -     Eyes:   EOM    [x]  Normal    [] Abnormal -   Sclera  [x]  Normal    [] Abnormal -          Discharge [x]  None visible   [] Abnormal -     HENT: [x] Normocephalic, atraumatic  [] Abnormal -   [x] Mouth/Throat: Mucous membranes are moist    External Ears [x] Normal  [] Abnormal -    Neck: [x] No visualized mass [] Abnormal -     Pulmonary/Chest: [x] Respiratory effort normal   [x] No visualized signs of difficulty breathing or respiratory distress        [] Abnormal -      Musculoskeletal:   [x] Normal gait with no signs of ataxia         [x] Normal range of motion of neck        [] Abnormal -     Neurological:        [x] No Facial Asymmetry (Cranial nerve 7 motor function) (limited exam due to video visit)          [x] No gaze palsy        [] Abnormal -          Skin:        [x] No significant exanthematous lesions or discoloration noted on facial skin         [] Abnormal -            Psychiatric:       [x] Normal Affect [] Abnormal -        [x] No Hallucinations    Other pertinent observable physical exam findings:-        We discussed the expected course, resolution and complications of the diagnosis(es) in detail. Medication risks, benefits, costs, interactions, and alternatives were discussed as indicated. I advised him to contact the office if his condition worsens, changes or fails to improve as anticipated. He expressed understanding with the diagnosis(es) and plan. Matilde Gomez, was evaluated through a synchronous (real-time) audio-video encounter. The patient (or guardian if applicable) is aware that this is a billable service, which includes applicable co-pays. This Virtual Visit was conducted with patient's (and/or legal guardian's) consent. The visit was conducted pursuant to the emergency declaration under the [address] [place] [address], [address] [place] [address] authority and the [employer] General Act. Patient identification was verified, and a caregiver was present when appropriate. The patient was located at: Home: [id number] [id number]  The provider was located at:  Facility (Appt Department): [phone]      This service was provided through telehealth, between patient Matilde Gomez participating from home and Patsy Jasso MD from [facility]     I discussed with the patient the nature of our telemedicine visits, that:      I would evaluate the patient and recommend diagnostics and treatments based on my assessment   Our sessions are not being recorded and that personal health information is protected   Our team would provide follow up care in person if/when the patient needs it    Nora Rivera MD

## 2022-11-22 DIAGNOSIS — R35.1 BPH ASSOCIATED WITH NOCTURIA: ICD-10-CM

## 2022-11-22 DIAGNOSIS — N40.1 BPH ASSOCIATED WITH NOCTURIA: ICD-10-CM

## 2022-11-22 RX ORDER — TAMSULOSIN HYDROCHLORIDE 0.4 MG/1
CAPSULE ORAL
Qty: 60 CAPSULE | Refills: 0 | Status: SHIPPED | OUTPATIENT
Start: 2022-11-22

## 2022-12-01 ENCOUNTER — TELEPHONE (OUTPATIENT)
Dept: CARDIOLOGY CLINIC | Age: 72
End: 2022-12-01

## 2022-12-01 NOTE — TELEPHONE ENCOUNTER
Verified patient with two types of identifiers. Spoke to [facility] about her , she is concerned about his shortness of breath. She wants to know if Dr. Andreas Chavez and Dr. Cervantes Diss think he needs a repeat cath. She reports Mr. Kendall testing positive for covid on 11-17-22. She states he has retested and it was negative. Most of his COVID symptoms have resolved, however he still has a cough and his shortness of breath has worsened. Asked if she had checked his vitals such as O2 or BP and she has not. Will update MD. Santi Mello taking him the ER if his symptoms worsen.

## 2022-12-02 DIAGNOSIS — E11.65 TYPE 2 DIABETES MELLITUS WITH HYPERGLYCEMIA, WITHOUT LONG-TERM CURRENT USE OF INSULIN (HCC): ICD-10-CM

## 2022-12-02 DIAGNOSIS — R06.09 DOE (DYSPNEA ON EXERTION): ICD-10-CM

## 2022-12-02 DIAGNOSIS — I10 HYPERTENSION, ESSENTIAL: ICD-10-CM

## 2022-12-02 DIAGNOSIS — E87.5 HYPERKALEMIA: ICD-10-CM

## 2022-12-02 DIAGNOSIS — Z95.1 S/P CABG X 3: ICD-10-CM

## 2022-12-02 RX ORDER — FUROSEMIDE 20 MG/1
20 TABLET ORAL DAILY
Qty: 90 TABLET | Refills: 1 | Status: SHIPPED | OUTPATIENT
Start: 2022-12-02

## 2022-12-02 RX ORDER — HYDROCHLOROTHIAZIDE 25 MG/1
25 TABLET ORAL DAILY
Qty: 90 TABLET | Refills: 1 | Status: SHIPPED | OUTPATIENT
Start: 2022-12-02

## 2022-12-02 RX ORDER — GLIPIZIDE 5 MG/1
TABLET ORAL
Qty: 180 TABLET | Refills: 1 | Status: SHIPPED | OUTPATIENT
Start: 2022-12-02

## 2022-12-02 RX ORDER — ISOSORBIDE MONONITRATE 30 MG/1
TABLET, EXTENDED RELEASE ORAL
Qty: 90 TABLET | Refills: 1 | Status: SHIPPED | OUTPATIENT
Start: 2022-12-02

## 2022-12-02 RX ORDER — METFORMIN HYDROCHLORIDE 1000 MG/1
TABLET ORAL
Qty: 180 TABLET | Refills: 1 | Status: SHIPPED | OUTPATIENT
Start: 2022-12-02

## 2022-12-02 NOTE — TELEPHONE ENCOUNTER
Pt wife called back for update from the nurse, please advise          Mary Haro (wife)  [phone]

## 2022-12-02 NOTE — TELEPHONE ENCOUNTER
Come to office Monday at 120 pm  Needs troponin BNP and other labs  Has minimal CP  More SOB in past 3 weeks , some wax and wane , saw PCP and treat for bronchitis  Not clear would be the Circ lesion which is reversible  LIMA and RCA grafts were open   Has recent drop in EF, consider add diuretic  Called Wife who was out at first number , got second number [phone] and spoke to pt directly  Called and spoke to patient -ID [id number]

## 2022-12-02 NOTE — TELEPHONE ENCOUNTER
Last Visit: 11/21/22 with MD Olayinka Cope  Next Appointment: 2/14/23 with MD Olayinka Cope  Previous Refill Encounter(s): 9/23/22 Glucophage, Lasix & Glucotrol 90 d/s, 9/12/22 HCTZ #90, 3/28/22 Imdur #90    Requested Prescriptions     Pending Prescriptions Disp Refills    furosemide (LASIX) 20 mg tablet [Pharmacy Med Name: Furosemide 20 MG Oral Tablet] 90 Tablet 1     Sig: Take 1 Tablet by mouth daily. glipiZIDE (GLUCOTROL) 5 mg tablet [Pharmacy Med Name: glipiZIDE 5 MG Oral Tablet] 180 Tablet 1     Sig: Take 1 tablet by mouth twice daily    isosorbide mononitrate ER (IMDUR) 30 mg tablet [Pharmacy Med Name: Isosorbide Mononitrate ER 30 MG Oral Tablet Extended Release 24 Hour] 90 Tablet 1     Sig: Take 1 tablet by mouth once daily    metFORMIN (GLUCOPHAGE) 1,000 mg tablet [Pharmacy Med Name: metFORMIN HCl 1000 MG Oral Tablet] 180 Tablet 1     Sig: TAKE 1 TABLET BY MOUTH TWICE DAILY WITH MEALS    hydroCHLOROthiazide (HYDRODIURIL) 25 mg tablet 90 Tablet 1     Sig: Take 1 Tablet by mouth daily. For [facility] in place:   Recommendation Provided To:    Intervention Detail: New Rx: 5, reason: Patient Preference  Gap Closed?:   Intervention Accepted By:   Time Spent (min): 5

## 2022-12-05 ENCOUNTER — OFFICE VISIT (OUTPATIENT)
Dept: CARDIOLOGY CLINIC | Age: 72
End: 2022-12-05
Payer: MEDICARE

## 2022-12-05 ENCOUNTER — HOSPITAL ENCOUNTER (INPATIENT)
Age: 72
LOS: 3 days | Discharge: HOME | DRG: 280 | End: 2022-12-08
Attending: EMERGENCY MEDICINE | Admitting: INTERNAL MEDICINE
Payer: MEDICARE

## 2022-12-05 ENCOUNTER — APPOINTMENT (OUTPATIENT)
Dept: CT IMAGING | Age: 72
DRG: 280 | End: 2022-12-05
Attending: EMERGENCY MEDICINE
Payer: MEDICARE

## 2022-12-05 ENCOUNTER — APPOINTMENT (OUTPATIENT)
Dept: GENERAL RADIOLOGY | Age: 72
DRG: 280 | End: 2022-12-05
Attending: EMERGENCY MEDICINE
Payer: MEDICARE

## 2022-12-05 VITALS
RESPIRATION RATE: 18 BRPM | HEIGHT: 69 IN | SYSTOLIC BLOOD PRESSURE: 128 MMHG | HEART RATE: 105 BPM | OXYGEN SATURATION: 94 % | DIASTOLIC BLOOD PRESSURE: 58 MMHG | BODY MASS INDEX: 19.28 KG/M2 | WEIGHT: 130.2 LBS

## 2022-12-05 DIAGNOSIS — I25.10 CORONARY ARTERY DISEASE INVOLVING NATIVE CORONARY ARTERY OF NATIVE HEART WITHOUT ANGINA PECTORIS: ICD-10-CM

## 2022-12-05 DIAGNOSIS — E11.65 TYPE 2 DIABETES MELLITUS WITH HYPERGLYCEMIA, WITHOUT LONG-TERM CURRENT USE OF INSULIN (HCC): ICD-10-CM

## 2022-12-05 DIAGNOSIS — I35.8 NON-RHEUMATIC AORTIC SCLEROSIS: ICD-10-CM

## 2022-12-05 DIAGNOSIS — I10 HYPERTENSION, ESSENTIAL: ICD-10-CM

## 2022-12-05 DIAGNOSIS — I21.4 NSTEMI (NON-ST ELEVATED MYOCARDIAL INFARCTION) (HCC): Primary | ICD-10-CM

## 2022-12-05 DIAGNOSIS — I50.33 DIASTOLIC CHF, ACUTE ON CHRONIC (HCC): ICD-10-CM

## 2022-12-05 DIAGNOSIS — I20.0 UNSTABLE ANGINA (HCC): Primary | ICD-10-CM

## 2022-12-05 DIAGNOSIS — E78.5 DYSLIPIDEMIA: ICD-10-CM

## 2022-12-05 DIAGNOSIS — I50.21 SYSTOLIC CHF, ACUTE (HCC): ICD-10-CM

## 2022-12-05 DIAGNOSIS — N18.30 CKD STAGE 3 DUE TO TYPE 2 DIABETES MELLITUS (HCC): ICD-10-CM

## 2022-12-05 DIAGNOSIS — E11.22 CKD STAGE 3 DUE TO TYPE 2 DIABETES MELLITUS (HCC): ICD-10-CM

## 2022-12-05 DIAGNOSIS — Z95.1 S/P CABG X 3: ICD-10-CM

## 2022-12-05 DIAGNOSIS — I73.9 PVD (PERIPHERAL VASCULAR DISEASE) (HCC): ICD-10-CM

## 2022-12-05 DIAGNOSIS — I21.4 NON-STEMI (NON-ST ELEVATED MYOCARDIAL INFARCTION) (HCC): ICD-10-CM

## 2022-12-05 LAB
ALBUMIN SERPL-MCNC: 3.8 G/DL (ref 3.5–5)
ALBUMIN/GLOB SERPL: 0.7 {RATIO} (ref 1.1–2.2)
ALP SERPL-CCNC: 96 U/L (ref 45–117)
ALT SERPL-CCNC: 23 U/L (ref 12–78)
ANION GAP SERPL CALC-SCNC: 9 MMOL/L (ref 5–15)
APTT PPP: 25.1 SEC (ref 22.1–31)
AST SERPL-CCNC: 18 U/L (ref 15–37)
ATRIAL RATE: 128 BPM
BASOPHILS # BLD: 0.1 K/UL (ref 0–0.1)
BASOPHILS # BLD: 0.1 K/UL (ref 0–0.1)
BASOPHILS NFR BLD: 1 % (ref 0–1)
BASOPHILS NFR BLD: 1 % (ref 0–1)
BILIRUB SERPL-MCNC: 0.4 MG/DL (ref 0.2–1)
BNP SERPL-MCNC: 2764 PG/ML
BUN SERPL-MCNC: 30 MG/DL (ref 6–20)
BUN/CREAT SERPL: 18 (ref 12–20)
CALCIUM SERPL-MCNC: 9.6 MG/DL (ref 8.5–10.1)
CALCULATED P AXIS, ECG09: 68 DEGREES
CALCULATED R AXIS, ECG10: 62 DEGREES
CALCULATED T AXIS, ECG11: -161 DEGREES
CHLORIDE SERPL-SCNC: 103 MMOL/L (ref 97–108)
CO2 SERPL-SCNC: 24 MMOL/L (ref 21–32)
COMMENT, HOLDF: NORMAL
COVID-19 RAPID TEST, COVR: NOT DETECTED
CREAT SERPL-MCNC: 1.71 MG/DL (ref 0.7–1.3)
D DIMER PPP FEU-MCNC: 1.53 MG/L FEU (ref 0–0.65)
DIAGNOSIS, 93000: NORMAL
DIFFERENTIAL METHOD BLD: ABNORMAL
DIFFERENTIAL METHOD BLD: ABNORMAL
EOSINOPHIL # BLD: 0.1 K/UL (ref 0–0.4)
EOSINOPHIL # BLD: 0.3 K/UL (ref 0–0.4)
EOSINOPHIL NFR BLD: 2 % (ref 0–7)
EOSINOPHIL NFR BLD: 4 % (ref 0–7)
ERYTHROCYTE [DISTWIDTH] IN BLOOD BY AUTOMATED COUNT: 17.1 % (ref 11.5–14.5)
ERYTHROCYTE [DISTWIDTH] IN BLOOD BY AUTOMATED COUNT: 17.2 % (ref 11.5–14.5)
FLUAV AG NPH QL IA: NEGATIVE
FLUBV AG NOSE QL IA: NEGATIVE
GLOBULIN SER CALC-MCNC: 5.3 G/DL (ref 2–4)
GLUCOSE SERPL-MCNC: 161 MG/DL (ref 65–100)
HCT VFR BLD AUTO: 34.4 % (ref 36.6–50.3)
HCT VFR BLD AUTO: 38.5 % (ref 36.6–50.3)
HGB BLD-MCNC: 10.1 G/DL (ref 12.1–17)
HGB BLD-MCNC: 11.6 G/DL (ref 12.1–17)
IMM GRANULOCYTES # BLD AUTO: 0 K/UL (ref 0–0.04)
IMM GRANULOCYTES # BLD AUTO: 0 K/UL (ref 0–0.04)
IMM GRANULOCYTES NFR BLD AUTO: 0 % (ref 0–0.5)
IMM GRANULOCYTES NFR BLD AUTO: 0 % (ref 0–0.5)
INR PPP: 1 (ref 0.9–1.1)
LYMPHOCYTES # BLD: 0.9 K/UL (ref 0.8–3.5)
LYMPHOCYTES # BLD: 1.1 K/UL (ref 0.8–3.5)
LYMPHOCYTES NFR BLD: 14 % (ref 12–49)
LYMPHOCYTES NFR BLD: 17 % (ref 12–49)
MCH RBC QN AUTO: 23.5 PG (ref 26–34)
MCH RBC QN AUTO: 23.8 PG (ref 26–34)
MCHC RBC AUTO-ENTMCNC: 29.4 G/DL (ref 30–36.5)
MCHC RBC AUTO-ENTMCNC: 30.1 G/DL (ref 30–36.5)
MCV RBC AUTO: 78.9 FL (ref 80–99)
MCV RBC AUTO: 80.2 FL (ref 80–99)
MONOCYTES # BLD: 0.5 K/UL (ref 0–1)
MONOCYTES # BLD: 0.6 K/UL (ref 0–1)
MONOCYTES NFR BLD: 8 % (ref 5–13)
MONOCYTES NFR BLD: 9 % (ref 5–13)
NEUTS SEG # BLD: 4.5 K/UL (ref 1.8–8)
NEUTS SEG # BLD: 4.6 K/UL (ref 1.8–8)
NEUTS SEG NFR BLD: 70 % (ref 32–75)
NEUTS SEG NFR BLD: 74 % (ref 32–75)
NRBC # BLD: 0 K/UL (ref 0–0.01)
NRBC # BLD: 0 K/UL (ref 0–0.01)
NRBC BLD-RTO: 0 PER 100 WBC
NRBC BLD-RTO: 0 PER 100 WBC
P-R INTERVAL, ECG05: 116 MS
PLATELET # BLD AUTO: 328 K/UL (ref 150–400)
PLATELET # BLD AUTO: 332 K/UL (ref 150–400)
PMV BLD AUTO: 11 FL (ref 8.9–12.9)
PMV BLD AUTO: 11.1 FL (ref 8.9–12.9)
POTASSIUM SERPL-SCNC: 4.2 MMOL/L (ref 3.5–5.1)
PROT SERPL-MCNC: 9.1 G/DL (ref 6.4–8.2)
PROTHROMBIN TIME: 10.8 SEC (ref 9–11.1)
Q-T INTERVAL, ECG07: 322 MS
QRS DURATION, ECG06: 76 MS
QTC CALCULATION (BEZET), ECG08: 470 MS
RBC # BLD AUTO: 4.29 M/UL (ref 4.1–5.7)
RBC # BLD AUTO: 4.88 M/UL (ref 4.1–5.7)
SAMPLES BEING HELD,HOLD: NORMAL
SODIUM SERPL-SCNC: 136 MMOL/L (ref 136–145)
SOURCE, COVRS: NORMAL
THERAPEUTIC RANGE,PTTT: NORMAL SECS (ref 58–77)
TROPONIN-HIGH SENSITIVITY: 2720 NG/L (ref 0–76)
UFH PPP CHRO-ACNC: <0.1 IU/ML
VENTRICULAR RATE, ECG03: 128 BPM
WBC # BLD AUTO: 6.2 K/UL (ref 4.1–11.1)
WBC # BLD AUTO: 6.6 K/UL (ref 4.1–11.1)

## 2022-12-05 PROCEDURE — 74011000250 HC RX REV CODE- 250: Performed by: INTERNAL MEDICINE

## 2022-12-05 PROCEDURE — 87635 SARS-COV-2 COVID-19 AMP PRB: CPT

## 2022-12-05 PROCEDURE — 36415 COLL VENOUS BLD VENIPUNCTURE: CPT

## 2022-12-05 PROCEDURE — 85610 PROTHROMBIN TIME: CPT

## 2022-12-05 PROCEDURE — 85379 FIBRIN DEGRADATION QUANT: CPT

## 2022-12-05 PROCEDURE — G8536 NO DOC ELDER MAL SCRN: HCPCS | Performed by: SPECIALIST

## 2022-12-05 PROCEDURE — 74011250637 HC RX REV CODE- 250/637: Performed by: INTERNAL MEDICINE

## 2022-12-05 PROCEDURE — 3017F COLORECTAL CA SCREEN DOC REV: CPT | Performed by: SPECIALIST

## 2022-12-05 PROCEDURE — 99214 OFFICE O/P EST MOD 30 MIN: CPT | Performed by: SPECIALIST

## 2022-12-05 PROCEDURE — 85730 THROMBOPLASTIN TIME PARTIAL: CPT

## 2022-12-05 PROCEDURE — 93005 ELECTROCARDIOGRAM TRACING: CPT

## 2022-12-05 PROCEDURE — 96375 TX/PRO/DX INJ NEW DRUG ADDON: CPT

## 2022-12-05 PROCEDURE — 71275 CT ANGIOGRAPHY CHEST: CPT

## 2022-12-05 PROCEDURE — 99223 1ST HOSP IP/OBS HIGH 75: CPT | Performed by: INTERNAL MEDICINE

## 2022-12-05 PROCEDURE — 1101F PT FALLS ASSESS-DOCD LE1/YR: CPT | Performed by: SPECIALIST

## 2022-12-05 PROCEDURE — 96374 THER/PROPH/DIAG INJ IV PUSH: CPT

## 2022-12-05 PROCEDURE — 3078F DIAST BP <80 MM HG: CPT | Performed by: SPECIALIST

## 2022-12-05 PROCEDURE — 87804 INFLUENZA ASSAY W/OPTIC: CPT

## 2022-12-05 PROCEDURE — 74011250636 HC RX REV CODE- 250/636: Performed by: INTERNAL MEDICINE

## 2022-12-05 PROCEDURE — 74011000636 HC RX REV CODE- 636: Performed by: RADIOLOGY

## 2022-12-05 PROCEDURE — 83880 ASSAY OF NATRIURETIC PEPTIDE: CPT

## 2022-12-05 PROCEDURE — 85025 COMPLETE CBC W/AUTO DIFF WBC: CPT

## 2022-12-05 PROCEDURE — 1123F ACP DISCUSS/DSCN MKR DOCD: CPT | Performed by: SPECIALIST

## 2022-12-05 PROCEDURE — 65660000001 HC RM ICU INTERMED STEPDOWN

## 2022-12-05 PROCEDURE — 85520 HEPARIN ASSAY: CPT

## 2022-12-05 PROCEDURE — 3074F SYST BP LT 130 MM HG: CPT | Performed by: SPECIALIST

## 2022-12-05 PROCEDURE — 84484 ASSAY OF TROPONIN QUANT: CPT

## 2022-12-05 PROCEDURE — 99285 EMERGENCY DEPT VISIT HI MDM: CPT

## 2022-12-05 PROCEDURE — G8427 DOCREV CUR MEDS BY ELIG CLIN: HCPCS | Performed by: SPECIALIST

## 2022-12-05 PROCEDURE — G8420 CALC BMI NORM PARAMETERS: HCPCS | Performed by: SPECIALIST

## 2022-12-05 PROCEDURE — 74011250637 HC RX REV CODE- 250/637: Performed by: EMERGENCY MEDICINE

## 2022-12-05 PROCEDURE — G8510 SCR DEP NEG, NO PLAN REQD: HCPCS | Performed by: SPECIALIST

## 2022-12-05 PROCEDURE — 80053 COMPREHEN METABOLIC PANEL: CPT

## 2022-12-05 PROCEDURE — 71046 X-RAY EXAM CHEST 2 VIEWS: CPT

## 2022-12-05 RX ORDER — ASPIRIN 81 MG/1
81 TABLET ORAL DAILY
Status: DISCONTINUED | OUTPATIENT
Start: 2022-12-06 | End: 2022-12-08 | Stop reason: HOSPADM

## 2022-12-05 RX ORDER — HEPARIN SODIUM 1000 [USP'U]/ML
60 INJECTION, SOLUTION INTRAVENOUS; SUBCUTANEOUS ONCE
Status: COMPLETED | OUTPATIENT
Start: 2022-12-05 | End: 2022-12-05

## 2022-12-05 RX ORDER — METOPROLOL TARTRATE 25 MG/1
25 TABLET, FILM COATED ORAL EVERY 12 HOURS
Status: DISCONTINUED | OUTPATIENT
Start: 2022-12-05 | End: 2022-12-06

## 2022-12-05 RX ORDER — CLOPIDOGREL BISULFATE 75 MG/1
75 TABLET ORAL DAILY
Status: DISCONTINUED | OUTPATIENT
Start: 2022-12-06 | End: 2022-12-08 | Stop reason: HOSPADM

## 2022-12-05 RX ORDER — TAMSULOSIN HYDROCHLORIDE 0.4 MG/1
0.4 CAPSULE ORAL DAILY
Status: DISCONTINUED | OUTPATIENT
Start: 2022-12-06 | End: 2022-12-08 | Stop reason: HOSPADM

## 2022-12-05 RX ORDER — HYDROCHLOROTHIAZIDE 25 MG/1
25 TABLET ORAL DAILY
Status: DISCONTINUED | OUTPATIENT
Start: 2022-12-06 | End: 2022-12-05

## 2022-12-05 RX ORDER — HEPARIN SODIUM 10000 [USP'U]/100ML
12-25 INJECTION, SOLUTION INTRAVENOUS
Status: DISCONTINUED | OUTPATIENT
Start: 2022-12-05 | End: 2022-12-06

## 2022-12-05 RX ORDER — METOPROLOL TARTRATE 5 MG/5ML
5 INJECTION INTRAVENOUS
Status: DISCONTINUED | OUTPATIENT
Start: 2022-12-05 | End: 2022-12-08

## 2022-12-05 RX ORDER — METFORMIN HYDROCHLORIDE 500 MG/1
500 TABLET ORAL 2 TIMES DAILY WITH MEALS
Status: DISCONTINUED | OUTPATIENT
Start: 2022-12-07 | End: 2022-12-05

## 2022-12-05 RX ORDER — METOPROLOL TARTRATE 5 MG/5ML
1.25 INJECTION INTRAVENOUS ONCE
Status: COMPLETED | OUTPATIENT
Start: 2022-12-05 | End: 2022-12-05

## 2022-12-05 RX ORDER — ALOGLIPTIN 12.5 MG/1
12.5 TABLET, FILM COATED ORAL DAILY
Status: DISCONTINUED | OUTPATIENT
Start: 2022-12-06 | End: 2022-12-07

## 2022-12-05 RX ORDER — ASPIRIN 325 MG
325 TABLET ORAL
Status: COMPLETED | OUTPATIENT
Start: 2022-12-05 | End: 2022-12-05

## 2022-12-05 RX ORDER — ROSUVASTATIN CALCIUM 10 MG/1
20 TABLET, COATED ORAL
Status: DISCONTINUED | OUTPATIENT
Start: 2022-12-05 | End: 2022-12-08 | Stop reason: HOSPADM

## 2022-12-05 RX ORDER — FUROSEMIDE 10 MG/ML
40 INJECTION INTRAMUSCULAR; INTRAVENOUS ONCE
Status: COMPLETED | OUTPATIENT
Start: 2022-12-05 | End: 2022-12-05

## 2022-12-05 RX ORDER — LANOLIN ALCOHOL/MO/W.PET/CERES
3 CREAM (GRAM) TOPICAL
Status: COMPLETED | OUTPATIENT
Start: 2022-12-05 | End: 2022-12-05

## 2022-12-05 RX ADMIN — METOPROLOL TARTRATE 25 MG: 25 TABLET, FILM COATED ORAL at 20:50

## 2022-12-05 RX ADMIN — HEPARIN SODIUM 12 UNITS/KG/HR: 10000 INJECTION, SOLUTION INTRAVENOUS at 23:51

## 2022-12-05 RX ADMIN — METOPROLOL TARTRATE 1.25 MG: 5 INJECTION, SOLUTION INTRAVENOUS at 17:25

## 2022-12-05 RX ADMIN — FUROSEMIDE 40 MG: 10 INJECTION, SOLUTION INTRAVENOUS at 18:19

## 2022-12-05 RX ADMIN — IOPAMIDOL 80 ML: 755 INJECTION, SOLUTION INTRAVENOUS at 19:07

## 2022-12-05 RX ADMIN — ASPIRIN 325 MG ORAL TABLET 325 MG: 325 PILL ORAL at 17:25

## 2022-12-05 RX ADMIN — HEPARIN SODIUM 3540 UNITS: 1000 INJECTION INTRAVENOUS; SUBCUTANEOUS at 23:50

## 2022-12-05 RX ADMIN — Medication 3 MG: at 23:56

## 2022-12-05 NOTE — PROGRESS NOTES
Neal Kendall     1950       Monik Isaac MD, [facility]  Date of Visit-12/05/2022    PCP is Rodrigo Conner MD   [facility] [place]  [facility]  HPI:  Manan Rivero is a 71 y.o. male   Urgent visit  CAD with CABG 6/9/2018. NSTEMI in November 2016 and resolved pulmonary HTN. Stent to circumflex an LAD in 2016. Stress echo in 2018 with a drop in BP with exercise and a drop in EF. Cath on 6/22/18 that showed even with a 5F catheter, he dampened with suspected ostial 3 vessel disease. Echo 3/27/2021 = EF 55 to 60% mild AR MR TR LAE MAC  Nuclear 3/18/2021 EF 64% medium size defect apical and inferior lateral reversible ischemia medium defect mid and inferolateral nonreversible appear to be infarction intermediate risk stress test .  PCI 02/28/2022--patent LIMA to LAD, vein graft to distal RCA with severe disease in the native vessels Occluded vein graft to OM 2. Native OM 2 severely diseased along with proximal to mid circumflex as well as distal left main. Overall the vessel is significantly remodeled negatively. Additionally there is significant disease in the first marginal branch which is even smaller as well as AV groove branch right at the takeoff of OM2. Single stent 2.25 x 28 mm Xience was placed extending from the OM 2 into the circumflex into the distal left main and sequentially dilated with 2.25 noncompliant, 2 5 noncompliant, 3 oh noncompliant and 3 5 noncompliant to perform multilevel POT to manage multiple bifurcations on the way. Atherectomy was considered but given small size of the vessels, was not deemed to be absolutely safe for the obtuse marginal lesion. Overall stent expanded fairly well related to the size of the vessels. Normal LVEDP    Today. 2 months ago was getting tired at work and had dizziness with systolic murmur. We stopped his Coreg and he came back and saw me 2 weeks ago.   He says he got a pain about once a week lasting 5 to 10 minutes. His wife then called and he did 2 last week and I spoke to them he started having increasing shortness of breath and some chest pain. He comes the office today with his wife. He reports for 1 week he has had increasing shortness of breath. 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 Friday he had chest pain and he took 1 nitro with relief but then had further chest pain with exertion. He did not notice a difference when we stopped the Coreg in terms of fatigue. He denies fever    Nuclear stress October 27, 2022 test showed ischemia similar to 3/15/2021 with a medium size defect in the mid to distal inferolateral and anterolateral segments in the circumflex territory he had septal dyskinesia with an EF of 39%. He had a fixed apical infarct  Echocardiogram October 27, 2022 showed an EF of 45 to 50% TAPSE at 1.2 showing reduced RV function sclerosis of the aortic valve with stenosis that was very mild with a valve area of mean of 7 mmHg and HUY of 1.4 cm². The mitral was thickened with restricted mobility and mild MR. Lab work in October 12, 2022 showed hemoglobin 11.2, creatinine 1.55 protein 8.6 triglycerides 263 cholesterol 143 HDL 49 LDL 41. Assessment/Plan:     There are no Patient Instructions on file for this visit. 1. Coronary artery disease involving native coronary artery of native heart with unstable angina pectoris  Angina had resolved with latest stenting to OM. Has fatigue, not better off of the beta-blocker  CABG 2018. cath 9/8/21 open LIMA. PCI to OM2 back up LM on 2/28/2022  Now with fatigue, labs echo and vickie reviewed   Now occ pain went over NTG use, some reversible, if pain worse, then cath  Now returns with shortness of breath at very short distance in addition to chest pain having unstable angina.   Will admit to the hospital and plan for cath in the morning obtain blood work including BNP and troponin. I will consider a KIARRA to look at the valve. 2.  Cardiomyopathy/systolic congestive heart failure acute on chronic  Will start Coreg and Entresto patient is already on Jardiance. Can stop Lasix and HCTZ and use Aldactone if potassium tolerates with the Entresto. EF moderately reduced CHF symptoms      3. Hypertension, essential  At goal , meds and possible side effects reviewed and patient denies  Key CAD CHF Meds               furosemide (LASIX) 20 mg tablet (Taking) Take 1 Tablet by mouth daily. isosorbide mononitrate ER (IMDUR) 30 mg tablet (Taking) Take 1 tablet by mouth once daily    hydroCHLOROthiazide (HYDRODIURIL) 25 mg tablet (Taking) Take 1 Tablet by mouth daily. nitroglycerin (NITROSTAT) 0.4 mg SL tablet (Taking) 1 Tablet by SubLINGual route every five (5) minutes as needed for Chest Pain. Up to 3 doses. clopidogreL (Plavix) 75 mg tab (Taking) Take 1 Tablet by mouth daily for 360 days. Indications: a sudden worsening of angina called acute coronary syndrome    rosuvastatin (CRESTOR) 20 mg tablet (Taking) Take 1 Tablet by mouth nightly. aspirin delayed-release 81 mg tablet (Taking) Take 1 Tab by mouth. BP Readings from Last 6 Encounters:   12/05/22 (!) 128/58   11/16/22 120/70   10/27/22 132/74   10/12/22 120/70   09/13/22 100/60   06/23/22 139/71           4. Dyslipidemia  At goal , denies excess muscle aches or new liver issues  Key Antihyperlipidemia Meds               rosuvastatin (CRESTOR) 20 mg tablet (Taking) Take 1 Tablet by mouth nightly. Lab Results   Component Value Date/Time    LDL, calculated 41.4 10/12/2022 09:10 AM          5. Non-rheumatic aortic sclerosis    5. S/P CABG x 3  6. Statin intolerance     Impression:   1. Unstable angina ([address]    2. Diastolic CHF, acute on chronic (HCC)    3. Hypertension, essential    4. Dyslipidemia    5. Non-rheumatic aortic sclerosis         Cardiac History:   Echo 1-15-18 EF 63% 63 %.  Mild LAE, MAC, mild AS mean 8, HUY 1.8 mild TR  CABG x 3 7-9-18= LIMA to LAD, SVG-OM, SVG-dRCA   Echo 6-22-18 EF 55-60%, mod MAC, aortic sclerosis without stenosis , mild TR  LISA 6-18-18 4 minutes +moderate hypokinesis of the mid anteroseptal, entire inferior, apical septal, and apical wall(s). With exercise a mild reduction in LV function. PCI 11-11-16 JEREMY to mLAD 95% JEREMY to OM1 90%     Future Appointments   Date Time Provider Mina Delgado   2/1/2023  3:40 PM MD TEZ Jo BS AMB   2/14/2023  8:40 AM Ambrose Hinds MD PAF BS AMB        Patient Care Team:  Ambrose Hinds MD as PCP - General (Family Medicine)  Ambrose Hinds MD as PCP - [facility] Empaneled Provider  Kristen Escudero MD (Cardiovascular Disease Physician)  Gui Serrano MD (Gastroenterology)  Troy Cruz MD (Cardiothoracic Surgery)  Martin Suazo MD (Cardiovascular Disease Physician)    ROS-except as noted above. . A complete cardiac and respiratory are reviewed and negative except as above ; Resp-denies wheezing  or productive cough,. Const- No unusual weight loss or fever; Neuro-no recent seizure or CVA ; GI- No BRBPR, abdom pain, bloating ; - no  hematuria   see supplement sheet, initialed and to be scanned by staff  Past Medical History:   Diagnosis Date    CAD (coronary artery disease) 11/10/2016    NSTEMI & 2 stents    Deafness 10/28/2012    DM (diabetes mellitus) [facility] [address]     Elevated cholesterol     Hypertension     NSTEMI (non-ST elevated myocardial infarction) ([facility] [address] 11/10/2016      Social Hx= reports that he has never smoked. He has never been exposed to tobacco smoke. He has never used smokeless tobacco. He reports current alcohol use of about 2.0 standard drinks per week. He reports that he does not use drugs.      Exam and Labs:  BP (!) 128/58 (BP 1 Location: Left upper arm, BP Patient Position: Sitting, BP Cuff Size: Adult)   Pulse (!) 105   Resp 18   Ht 5' 9\" (1.753 m)   Wt 130 lb 3.2 oz (59.1 kg)   SpO2 94%   BMI 19.23 kg/m² Constitutional:  NAD, comfortable  Head: NC,AT. Eyes: No scleral icterus. Neck:  Neck supple. No JVD present. Throat: moist mucous membranes. Chest: Effort normal & normal respiratory excursion . Neurological: alert, conversant and oriented . Skin: Skin is not cold. No obvious systemic rash noted. Not diaphoretic. No erythema. Psychiatric:  Grossly normal mood and affect. Behavior appears normal. Extremities:  no clubbing or cyanosis. Abdomen: non distended    Lungs:breath sounds normal. No stridor. distress, wheezes or  Rales. Heart:2/6 murmur unchanged normal rate, regular rhythm, normal S1, S2, no murmurs, rubs, clicks or gallops , PMI non displaced. Edema: Edema is none. Lab Results   Component Value Date/Time    Cholesterol, total 143 10/12/2022 09:10 AM    HDL Cholesterol 49 10/12/2022 09:10 AM    LDL, calculated 41.4 10/12/2022 09:10 AM    Triglyceride 263 (H) 10/12/2022 09:10 AM    CHOL/HDL Ratio 2.9 10/12/2022 09:10 AM     Lab Results   Component Value Date/Time    Sodium 138 10/12/2022 09:10 AM    Potassium 4.7 10/12/2022 09:10 AM    Chloride 105 10/12/2022 09:10 AM    CO2 28 10/12/2022 09:10 AM    Anion gap 5 10/12/2022 09:10 AM    Glucose 78 10/12/2022 09:10 AM    BUN 31 (H) 10/12/2022 09:10 AM    Creatinine 1.55 (H) 10/12/2022 09:10 AM    BUN/Creatinine ratio 20 10/12/2022 09:10 AM    GFR est AA 46 (L) 06/23/2022 09:40 AM    GFR est non-AA 38 (L) 06/23/2022 09:40 AM    Calcium 10.2 (H) 10/12/2022 09:10 AM      Wt Readings from Last 3 Encounters:   12/05/22 130 lb 3.2 oz (59.1 kg)   11/16/22 135 lb (61.2 kg)   10/27/22 134 lb (60.8 kg)      BP Readings from Last 3 Encounters:   12/05/22 (!) 128/58   11/16/22 120/70   10/27/22 132/74      Current Outpatient Medications   Medication Sig    furosemide (LASIX) 20 mg tablet Take 1 Tablet by mouth daily.     glipiZIDE (GLUCOTROL) 5 mg tablet Take 1 tablet by mouth twice daily    isosorbide mononitrate ER (IMDUR) 30 mg tablet Take 1 tablet by mouth once daily    metFORMIN (GLUCOPHAGE) 1,000 mg tablet TAKE 1 TABLET BY MOUTH TWICE DAILY WITH MEALS    hydroCHLOROthiazide (HYDRODIURIL) 25 mg tablet Take 1 Tablet by mouth daily. tamsulosin (FLOMAX) 0.4 mg capsule Take 1 capsule by mouth once daily    ipratropium (ATROVENT) 21 mcg (0.03 %) nasal spray 2 Sprays by Both Nostrils route every twelve (12) hours. nitroglycerin (NITROSTAT) 0.4 mg SL tablet 1 Tablet by SubLINGual route every five (5) minutes as needed for Chest Pain. Up to 3 doses. Jardiance 25 mg tablet TAKE 1 TABLET BY MOUTH IN THE MORNING    ergocalciferol (ERGOCALCIFEROL) 1,250 mcg (50,000 unit) capsule Take 1 Capsule by mouth every seven (7) days. Januvia 50 mg tablet Take 1 tablet by mouth once daily    polyethylene glycol (MIRALAX) 17 gram/dose powder Take 17 g by mouth daily. albendazole (ALBENZA) 200 mg tablet TAKE 2 TABLETS BY MOUTH ONCE FOR 1 DOSE. THEN REPEAT IN ONE WEEK    clopidogreL (Plavix) 75 mg tab Take 1 Tablet by mouth daily for 360 days. Indications: a sudden worsening of angina called acute coronary syndrome    rosuvastatin (CRESTOR) 20 mg tablet Take 1 Tablet by mouth nightly. aspirin delayed-release 81 mg tablet Take 1 Tab by mouth. No current facility-administered medications for this visit. Impression see above. This note was created using voice recognition software. Despite editing, there may be syntax errors.

## 2022-12-05 NOTE — ED PROVIDER NOTES
23-year-old male with PMHx of NSTEMI status post CABG and stent placement x2, DM, HTN presents the emergency department complaining of ongoing, constant, progressively worsening shortness of breath since last week and intermittent, nonradiating, pinching substernal chest pain which he experienced starting 1 week ago that is now resolved (patient notes that he last felt this pain 3 days ago. He does report that his shortness of breath is consistent with symptoms experienced before his NSTEMI. He has no additional complaints at this time    The history is provided by the patient. Chest Pain (Angina)   This is a new problem. The current episode started more than 2 days ago. The problem has been resolved. The problem occurs daily. The pain is associated with exertion. The pain is mild. The quality of the pain is described as sharp. The pain does not radiate. The symptoms are aggravated by exertion. Associated symptoms include shortness of breath. He has tried nothing for the symptoms. Risk factors include cardiac disease. Procedural history includes cardiac catheterization, cardiac stents and CABG.      Past Medical History:   Diagnosis Date    CAD (coronary artery disease) 11/10/2016    NSTEMI & 2 stents    Deafness 10/28/2012    DM (diabetes mellitus) ([facility] [address]     Elevated cholesterol     Hypertension     NSTEMI (non-ST elevated myocardial infarction) ([facility] [address] 11/10/2016       Past Surgical History:   Procedure Laterality Date    COLONOSCOPY N/A 6/28/2018    COLONOSCOPY performed by Vargas Garcia MD at [facility] ENDOSCOPY    [address] UNLIST  11/11/2016    2 stents         Family History:   Problem Relation Age of Onset    Heart Disease Father     Heart Attack Father     Hypertension Mother     Elevated Lipids Brother     Elevated Lipids Brother     No Known Problems Sister     Elevated Lipids Brother     No Known Problems Son     No Known Problems Daughter     Anesth Problems Neg Hx Social History     Socioeconomic History    Marital status:      Spouse name: Not on file    Number of children: Not on file    Years of education: Not on file    Highest education level: Not on file   Occupational History    Not on file   Tobacco Use    Smoking status: Never     Passive exposure: Never    Smokeless tobacco: Never   Vaping Use    Vaping Use: Never used   Substance and Sexual Activity    Alcohol use: Yes     Alcohol/week: 2.0 standard drinks     Types: 1 Cans of beer, 1 Shots of liquor per week     Comment: 1 DRINK DAILY    Drug use: No    Sexual activity: Yes   Other Topics Concern    Not on file   Social History Narrative    Not on file     Social Determinants of Health     Financial Resource Strain: Medium Risk    Difficulty of Paying Living Expenses: Somewhat hard   Food Insecurity: Food Insecurity Present    Worried About Running Out of Food in the Last Year: Never true    Ran Out of Food in the Last Year: Often true   Transportation Needs: Not on file   Physical Activity: Not on file   Stress: Not on file   Social Connections: Not on file   Intimate Partner Violence: Not on file   Housing Stability: Not on file         ALLERGIES: Patient has no known allergies. Review of Systems   Constitutional: Negative. HENT: Negative. Eyes: Negative. Respiratory:  Positive for shortness of breath. Cardiovascular:  Positive for chest pain. Gastrointestinal: Negative. Endocrine: Negative. Genitourinary: Negative. Musculoskeletal: Negative. Skin: Negative. Neurological: Negative. Psychiatric/Behavioral: Negative. Vitals:    12/05/22 1604   BP: 133/84   Pulse: (!) 123   Resp: 16   Temp: 98.1 °F (36.7 °C)   SpO2: 95%   Weight: 59 kg (130 lb)   Height: 5' 9\" (1.753 m)            Physical Exam  Vitals and nursing note reviewed. Constitutional:       General: He is not in acute distress. Appearance: Normal appearance. He is not ill-appearing.    HENT: Head: Normocephalic and atraumatic. Nose: Nose normal.      Mouth/Throat:      Mouth: Mucous membranes are moist.   Eyes:      Extraocular Movements: Extraocular movements intact. Pupils: Pupils are equal, round, and reactive to light. Cardiovascular:      Rate and Rhythm: Regular rhythm. Tachycardia present. Pulses: Normal pulses. Pulmonary:      Effort: Pulmonary effort is normal. No respiratory distress. Breath sounds: Normal breath sounds. Abdominal:      General: There is no distension. Palpations: Abdomen is soft. Tenderness: There is no abdominal tenderness. Musculoskeletal:         General: Normal range of motion. Cervical back: Normal range of motion. Skin:     General: Skin is warm and dry. Neurological:      General: No focal deficit present. Mental Status: He is alert and oriented to person, place, and time. Psychiatric:         Mood and Affect: Mood normal.        MDM  Number of Diagnoses or Management Options  NSTEMI (non-ST elevated myocardial infarction) ([facility] [address]  Diagnosis management comments: DDx: ACS, pericarditis, myocarditis, PE, musculoskeletal pain, anxiety    Plan:  - Labs: BMP, CBC, troponin, D-dimer  - Imaging: CXR  - Medications: Aspirin    EKG: This EKG was interpreted by me. Sinus tachycardia at 128 bpm.  ST elevations present in aVR, V1, V2. ST depressions present in the lateral and inferior leads. Reassessment: Discussed the patient with his cardiologist, who is evaluated the patient at bedside. They favor a secondary MI and recommend rate control, for which they have ordered metoprolol. We will also pursue CT angiography of the chest to look for PE. Will admit patient to the hospitalist service. Perfect Serve Consult for Admission  6:06 PM    ED Room Number: ER26/26  Patient Name and age:  Fanny Kendall 71 y.o.  male  Working Diagnosis: No diagnosis found.     COVID-19 Suspicion:  no  Sepsis present:  no  Reassessment needed: no  Code Status:  Full Code  Readmission: no  Isolation Requirements:  no  Recommended Level of Care:  telemetry  Department:[facility] Adult ED - [phone]  Other:  50-year-old male with PMHx of NSTEMI status post CABG and stent placement x2, DM, HTN presents the emergency department complaining of ongoing, constant, progressively worsening shortness of breath since last week and intermittent, nonradiating, pinching substernal chest pain which he experienced starting 1 week ago that is now resolved (patient notes that he last felt this pain 3 days ago). EKG with some findings concerning for ischemia, and troponin elevated to 2700. Cardiology has assessed him and believe this is likely more related to demand ischemia. They have recommended against heparin for now given history of GI bleed. They recommended admission to hospitalist service while he awaits echo and possible cath.           Amount and/or Complexity of Data Reviewed  Clinical lab tests: reviewed  Tests in the radiology section of CPT®: reviewed  Tests in the medicine section of CPT®: reviewed  Discuss the patient with other providers: yes  Independent visualization of images, tracings, or specimens: yes    Risk of Complications, Morbidity, and/or Mortality  Presenting problems: moderate  Diagnostic procedures: low  Management options: low    Patient Progress  Patient progress: improved    ED Course as of 12/05/22 1806   Mon Dec 05, 2022   1805 D-dimer(!): 1.53 [JT]      ED Course User Index  [JT] Jenny Newby MD       Procedures

## 2022-12-05 NOTE — Clinical Note
Abnormal Uterine Bleeding: Care Instructions  Your Care Instructions     Abnormal uterine bleeding is irregular bleeding from the uterus that is longer or heavier than usual or does not occur at your regular time. Sometimes it is caused by changes in hormone levels. It can also be caused by growths in the uterus, such as fibroids or polyps. Sometimes a cause cannot be found. You may have heavy bleeding when you are not expecting your period. Your doctor may suggest a pregnancy test, if you think you are pregnant. Follow-up care is a key part of your treatment and safety. Be sure to make and go to all appointments, and call your doctor if you are having problems. It's also a good idea to know your test results and keep a list of the medicines you take. How can you care for yourself at home? · Be safe with medicines. Take pain medicines exactly as directed. ? If the doctor gave you a prescription medicine for pain, take it as prescribed. ? If you are not taking a prescription pain medicine, ask your doctor if you can take an over-the-counter medicine. · You may be low in iron because of blood loss. Eat a balanced diet that is high in iron and vitamin C. Foods rich in iron include red meat, shellfish, eggs, beans, and leafy green vegetables. Talk to your doctor about whether you need to take iron pills or a multivitamin. When should you call for help? Call 911 anytime you think you may need emergency care. For example, call if:    · You passed out (lost consciousness). Call your doctor now or seek immediate medical care if:    · You have new or worse belly or pelvic pain.     · You have severe vaginal bleeding.     · You feel dizzy or lightheaded, or you feel like you may faint. Watch closely for changes in your health, and be sure to contact your doctor if:    · You think you may be pregnant.     · Your bleeding gets worse.     · You do not get better as expected. Where can you learn more?   Go to Diagnostic catheter inserted over the wire. Catheter used: Left 4. http://www.gray.com/  Enter [id number] in the search box to learn more about \"Abnormal Uterine Bleeding: Care Instructions. \"  Current as of: February 11, 2021               Content Version: 13.0  © 8344-7838 [employer], [employer]orporated. Care instructions adapted under license by [employer] (which disclaims liability or warranty for this information). If you have questions about a medical condition or this instruction, always ask your healthcare professional. [address] any warranty or liability for your use of this information.

## 2022-12-05 NOTE — Clinical Note
Status[de-identified] INPATIENT [101]   Type of Bed: Telemetry [19]   Cardiac Monitoring Required?: Yes   Inpatient Hospitalization Certified Necessary for the Following Reasons: 3.  Patient receiving treatment that can only be provided in an inpatient setting (further clarification in H&P documentation)   Admitting Diagnosis: NSTEMI (non-ST elevated myocardial infarction) [facility]) [[id number]]   Admitting Physician: Almas aMlave   Attending Physician: Almas Malave   Estimated Length of Stay: 3-4 Midnights   Discharge Plan[de-identified] Home with Office Follow-up

## 2022-12-05 NOTE — PROGRESS NOTES
Dr. Zara Lucero. [phone]            Cardiology Consult/Progress Note      Requesting/referring provider: Elton Gill MD    Reason for Consult:Increasing dyspnea    Assessment/Plan:  CAD: MVD with recent progression of angina  Low normal EF?cardiomyopathy  HTN  PAD  HL  6. H/O Anemia    Plan to admit. Need slowing sinus rates with BB. Continue DAPT. If hsTroponin > 5000, may start heparin drip. Will need cath. Continue GDMT for CAD    Investigations ordered    []    High complexity decision making was performed  []    Patient is at high-risk of decompensation with multiple organ involvement  []    Complex/difficult social determinants of health outcomes  Total of  minutes were spent on reviewing the records, analyzing investigations and documentation in the chart, on the day of visit including time for examination and time spent with the patient  Investigations personally reviewed and interpreted    Investigations reviewed    HPI: Frandy Arreola, a 71y.o. year-old who is seen for evaluation of dyspena and management of CAD. Recent abnormal stress test with falling EF. Heart rates high. Admittted for optimization and management of unstable angina given accelerated symptoms. .  He  has a past medical history of CAD (coronary artery disease) (11/10/2016), Deafness (10/28/2012), DM (diabetes mellitus) ([facility] [address] Elevated cholesterol, Hypertension, and NSTEMI (non-ST elevated myocardial infarction) ([facility] [address] (11/10/2016). Review of system:Patient reports no PND/Orthpnea/CP. He reports no cough/fever/focal neurological deficits/abdominal pain. All other systems negative except as above.    Family History   Problem Relation Age of Onset    Heart Disease Father     Heart Attack Father     Hypertension Mother     Elevated Lipids Brother     Elevated Lipids Brother     No Known Problems Sister     Elevated Lipids Brother     No Known Problems Son     No Known Problems Daughter     Anesth Problems Neg Hx       Social History     Socioeconomic History    Marital status:    Tobacco Use    Smoking status: Never     Passive exposure: Never    Smokeless tobacco: Never   Vaping Use    Vaping Use: Never used   Substance and Sexual Activity    Alcohol use: Yes     Alcohol/week: 2.0 standard drinks     Types: 1 Cans of beer, 1 Shots of liquor per week     Comment: 1 DRINK DAILY    Drug use: No    Sexual activity: Yes     Social Determinants of Health     Financial Resource Strain: Medium Risk    Difficulty of Paying Living Expenses: Somewhat hard   Food Insecurity: Food Insecurity Present    Worried About Running Out of Food in the Last Year: Never true    Ran Out of Food in the Last Year: Often true      PE  Vitals:    12/05/22 1604   BP: 133/84   Pulse: (!) 123   Resp: 16   Temp: 98.1 °F (36.7 °C)   SpO2: 95%   Weight: 130 lb (59 kg)   Height: 5' 9\" (1.753 m)    Body mass index is 19.2 kg/m². General:    Alert, cooperative, no distress. Psychiatric:    Normal Mood and affect    Eye/ENT:      Pupils equal, No asymmetry, Conjunctival pink. Able to hear voice at normal amplitude   Lungs:      Visibly symmetric chest expansion, No palpable tenderness. Clear to auscultation bilaterally. Heart[de-identified]    Regular rate and rhythm, S1, S2 normal, no murmur, click, rub or gallop. No JVD, Normal palpable peripheral pulses. No cyanosis   Abdomen:     Soft, non-tender. Bowel sounds normal. No masses,  No      organomegaly. Extremities:   Extremities normal, atraumatic, no edema. Neurologic:   CN II-XII grossly intact.  No gross focal deficits           Recent Labs:  Lab Results   Component Value Date/Time    Cholesterol, total 143 10/12/2022 09:10 AM    HDL Cholesterol 49 10/12/2022 09:10 AM    LDL, calculated 41.4 10/12/2022 09:10 AM    Triglyceride 263 (H) 10/12/2022 09:10 AM    CHOL/HDL Ratio 2.9 10/12/2022 09:10 AM     Lab Results   Component Value Date/Time    Creatinine 1.71 (H) 12/05/2022 04:18 PM     Lab Results   Component Value Date/Time    BUN 30 (H) 12/05/2022 04:18 PM     Lab Results   Component Value Date/Time    Potassium 4.2 12/05/2022 04:18 PM     Lab Results   Component Value Date/Time    Hemoglobin A1c 7.0 (H) 10/12/2022 09:10 AM     Lab Results   Component Value Date/Time    HGB 11.6 (L) 12/05/2022 04:18 PM     Lab Results   Component Value Date/Time    PLATELET 004 80/77/0718 04:18 PM       Reviewed:  Past Medical History:   Diagnosis Date    CAD (coronary artery disease) 11/10/2016    NSTEMI & 2 stents    Deafness 10/28/2012    DM (diabetes mellitus) ([facility] [address]     Elevated cholesterol     Hypertension     NSTEMI (non-ST elevated myocardial infarction) ([facility] [address] 11/10/2016     Social History     Tobacco Use   Smoking Status Never    Passive exposure: Never   Smokeless Tobacco Never     Social History     Substance and Sexual Activity   Alcohol Use Yes    Alcohol/week: 2.0 standard drinks    Types: 1 Cans of beer, 1 Shots of liquor per week    Comment: 1 DRINK DAILY     No Known Allergies  Family History   Problem Relation Age of Onset    Heart Disease Father     Heart Attack Father     Hypertension Mother     Elevated Lipids Brother     Elevated Lipids Brother     No Known Problems Sister     Elevated Lipids Brother     No Known Problems Son     No Known Problems Daughter     Anesth Problems Neg Hx         Current Facility-Administered Medications   Medication Dose Route Frequency    metoprolol tartrate (LOPRESSOR) tablet 25 mg  25 mg Oral Q12H    furosemide (LASIX) injection 40 mg  40 mg IntraVENous ONCE     Current Outpatient Medications   Medication Sig    furosemide (LASIX) 20 mg tablet Take 1 Tablet by mouth daily.     glipiZIDE (GLUCOTROL) 5 mg tablet Take 1 tablet by mouth twice daily    isosorbide mononitrate ER (IMDUR) 30 mg tablet Take 1 tablet by mouth once daily    metFORMIN (GLUCOPHAGE) 1,000 mg tablet TAKE 1 TABLET BY MOUTH TWICE DAILY WITH MEALS    hydroCHLOROthiazide (HYDRODIURIL) 25 mg tablet Take 1 Tablet by mouth daily. tamsulosin (FLOMAX) 0.4 mg capsule Take 1 capsule by mouth once daily    ipratropium (ATROVENT) 21 mcg (0.03 %) nasal spray 2 Sprays by Both Nostrils route every twelve (12) hours. nitroglycerin (NITROSTAT) 0.4 mg SL tablet 1 Tablet by SubLINGual route every five (5) minutes as needed for Chest Pain. Up to 3 doses. Jardiance 25 mg tablet TAKE 1 TABLET BY MOUTH IN THE MORNING    ergocalciferol (ERGOCALCIFEROL) 1,250 mcg (50,000 unit) capsule Take 1 Capsule by mouth every seven (7) days. Januvia 50 mg tablet Take 1 tablet by mouth once daily    polyethylene glycol (MIRALAX) 17 gram/dose powder Take 17 g by mouth daily. albendazole (ALBENZA) 200 mg tablet TAKE 2 TABLETS BY MOUTH ONCE FOR 1 DOSE. THEN REPEAT IN ONE WEEK    clopidogreL (Plavix) 75 mg tab Take 1 Tablet by mouth daily for 360 days. Indications: a sudden worsening of angina called acute coronary syndrome    rosuvastatin (CRESTOR) 20 mg tablet Take 1 Tablet by mouth nightly. aspirin delayed-release 81 mg tablet Take 1 Tab by mouth. ATTENTION:   This medical record was transcribed using an electronic medical records/speech recognition system. Although proofread, it may and can contain electronic, spelling and other errors. Corrections may be executed at a later time. Please feel free to contact us for any clarifications as needed.     [facility] [place]  [facility] [place], [place]. [phone]

## 2022-12-05 NOTE — ED TRIAGE NOTES
Patient arrives from dr Shiela Royal for unstable angina and admission. Patient denies chest pain upon arrival. Reports shortness of breath with ambulation.

## 2022-12-06 ENCOUNTER — APPOINTMENT (OUTPATIENT)
Dept: VASCULAR SURGERY | Age: 72
DRG: 280 | End: 2022-12-06
Attending: INTERNAL MEDICINE
Payer: MEDICARE

## 2022-12-06 ENCOUNTER — TRANSCRIBE ORDER (OUTPATIENT)
Dept: CARDIAC REHAB | Age: 72
End: 2022-12-06

## 2022-12-06 DIAGNOSIS — I21.4 ACUTE MYOCARDIAL INFARCTION, SUBENDOCARDIAL INFARCTION, INITIAL EPISODE OF CARE (HCC): Primary | ICD-10-CM

## 2022-12-06 LAB
ACT BLD: 227 SECS (ref 79–138)
ALBUMIN SERPL-MCNC: 3.4 G/DL (ref 3.5–5)
ALBUMIN/GLOB SERPL: 0.8 {RATIO} (ref 1.1–2.2)
ALP SERPL-CCNC: 77 U/L (ref 45–117)
ALT SERPL-CCNC: 21 U/L (ref 12–78)
ANION GAP SERPL CALC-SCNC: 9 MMOL/L (ref 5–15)
AST SERPL-CCNC: 16 U/L (ref 15–37)
BASOPHILS # BLD: 0.1 K/UL (ref 0–0.1)
BASOPHILS NFR BLD: 1 % (ref 0–1)
BILIRUB SERPL-MCNC: 0.3 MG/DL (ref 0.2–1)
BUN SERPL-MCNC: 36 MG/DL (ref 6–20)
BUN/CREAT SERPL: 21 (ref 12–20)
CALCIUM SERPL-MCNC: 9.4 MG/DL (ref 8.5–10.1)
CHLORIDE SERPL-SCNC: 104 MMOL/L (ref 97–108)
CO2 SERPL-SCNC: 23 MMOL/L (ref 21–32)
CREAT SERPL-MCNC: 1.7 MG/DL (ref 0.7–1.3)
DIFFERENTIAL METHOD BLD: ABNORMAL
EOSINOPHIL # BLD: 0.2 K/UL (ref 0–0.4)
EOSINOPHIL NFR BLD: 3 % (ref 0–7)
ERYTHROCYTE [DISTWIDTH] IN BLOOD BY AUTOMATED COUNT: 17.2 % (ref 11.5–14.5)
GLOBULIN SER CALC-MCNC: 4.3 G/DL (ref 2–4)
GLUCOSE BLD STRIP.AUTO-MCNC: 104 MG/DL (ref 65–117)
GLUCOSE BLD STRIP.AUTO-MCNC: 114 MG/DL (ref 65–117)
GLUCOSE BLD STRIP.AUTO-MCNC: 178 MG/DL (ref 65–117)
GLUCOSE BLD STRIP.AUTO-MCNC: 185 MG/DL (ref 65–117)
GLUCOSE BLD STRIP.AUTO-MCNC: 207 MG/DL (ref 65–117)
GLUCOSE SERPL-MCNC: 159 MG/DL (ref 65–100)
HCT VFR BLD AUTO: 32.5 % (ref 36.6–50.3)
HGB BLD-MCNC: 9.9 G/DL (ref 12.1–17)
IMM GRANULOCYTES # BLD AUTO: 0 K/UL (ref 0–0.04)
IMM GRANULOCYTES NFR BLD AUTO: 0 % (ref 0–0.5)
LYMPHOCYTES # BLD: 1.2 K/UL (ref 0.8–3.5)
LYMPHOCYTES NFR BLD: 20 % (ref 12–49)
MAGNESIUM SERPL-MCNC: 2.4 MG/DL (ref 1.6–2.4)
MCH RBC QN AUTO: 23.7 PG (ref 26–34)
MCHC RBC AUTO-ENTMCNC: 30.5 G/DL (ref 30–36.5)
MCV RBC AUTO: 77.8 FL (ref 80–99)
MONOCYTES # BLD: 0.7 K/UL (ref 0–1)
MONOCYTES NFR BLD: 12 % (ref 5–13)
NEUTS SEG # BLD: 3.8 K/UL (ref 1.8–8)
NEUTS SEG NFR BLD: 64 % (ref 32–75)
NRBC # BLD: 0 K/UL (ref 0–0.01)
NRBC BLD-RTO: 0 PER 100 WBC
PHOSPHATE SERPL-MCNC: 5 MG/DL (ref 2.6–4.7)
PLATELET # BLD AUTO: 296 K/UL (ref 150–400)
PMV BLD AUTO: 11 FL (ref 8.9–12.9)
POTASSIUM SERPL-SCNC: 4.2 MMOL/L (ref 3.5–5.1)
PROT SERPL-MCNC: 7.7 G/DL (ref 6.4–8.2)
RBC # BLD AUTO: 4.18 M/UL (ref 4.1–5.7)
SERVICE CMNT-IMP: ABNORMAL
SERVICE CMNT-IMP: NORMAL
SERVICE CMNT-IMP: NORMAL
SODIUM SERPL-SCNC: 136 MMOL/L (ref 136–145)
TROPONIN-HIGH SENSITIVITY: 2591 NG/L (ref 0–76)
TSH SERPL DL<=0.05 MIU/L-ACNC: 4.8 UIU/ML (ref 0.36–3.74)
UFH PPP CHRO-ACNC: 0.13 IU/ML
UFH PPP CHRO-ACNC: >1.5 IU/ML
WBC # BLD AUTO: 5.9 K/UL (ref 4.1–11.1)

## 2022-12-06 PROCEDURE — 99153 MOD SED SAME PHYS/QHP EA: CPT | Performed by: INTERNAL MEDICINE

## 2022-12-06 PROCEDURE — 74011250637 HC RX REV CODE- 250/637: Performed by: INTERNAL MEDICINE

## 2022-12-06 PROCEDURE — 65660000001 HC RM ICU INTERMED STEPDOWN

## 2022-12-06 PROCEDURE — 74011000636 HC RX REV CODE- 636: Performed by: INTERNAL MEDICINE

## 2022-12-06 PROCEDURE — 93459 L HRT ART/GRFT ANGIO: CPT | Performed by: INTERNAL MEDICINE

## 2022-12-06 PROCEDURE — 84443 ASSAY THYROID STIM HORMONE: CPT

## 2022-12-06 PROCEDURE — 82962 GLUCOSE BLOOD TEST: CPT

## 2022-12-06 PROCEDURE — C1725 CATH, TRANSLUMIN NON-LASER: HCPCS | Performed by: INTERNAL MEDICINE

## 2022-12-06 PROCEDURE — 74011636637 HC RX REV CODE- 636/637: Performed by: INTERNAL MEDICINE

## 2022-12-06 PROCEDURE — 85347 COAGULATION TIME ACTIVATED: CPT

## 2022-12-06 PROCEDURE — 99152 MOD SED SAME PHYS/QHP 5/>YRS: CPT | Performed by: INTERNAL MEDICINE

## 2022-12-06 PROCEDURE — 77030021532 HC CATH ANGI DX IMPRS MRTM -B: Performed by: INTERNAL MEDICINE

## 2022-12-06 PROCEDURE — 74011000250 HC RX REV CODE- 250: Performed by: INTERNAL MEDICINE

## 2022-12-06 PROCEDURE — 85520 HEPARIN ASSAY: CPT

## 2022-12-06 PROCEDURE — 37225 PR REVSC OPN/PRQ FEM/POP W/ATHRC/ANGIOP SM VSL: CPT | Performed by: INTERNAL MEDICINE

## 2022-12-06 PROCEDURE — 77030013744: Performed by: INTERNAL MEDICINE

## 2022-12-06 PROCEDURE — 2709999900 HC NON-CHARGEABLE SUPPLY: Performed by: INTERNAL MEDICINE

## 2022-12-06 PROCEDURE — C1769 GUIDE WIRE: HCPCS | Performed by: INTERNAL MEDICINE

## 2022-12-06 PROCEDURE — 93970 EXTREMITY STUDY: CPT

## 2022-12-06 PROCEDURE — C1760 CLOSURE DEV, VASC: HCPCS | Performed by: INTERNAL MEDICINE

## 2022-12-06 PROCEDURE — 80053 COMPREHEN METABOLIC PANEL: CPT

## 2022-12-06 PROCEDURE — 74011250637 HC RX REV CODE- 250/637: Performed by: HOSPITALIST

## 2022-12-06 PROCEDURE — 75716 ARTERY X-RAYS ARMS/LEGS: CPT | Performed by: INTERNAL MEDICINE

## 2022-12-06 PROCEDURE — 74011250636 HC RX REV CODE- 250/636: Performed by: INTERNAL MEDICINE

## 2022-12-06 PROCEDURE — 99233 SBSQ HOSP IP/OBS HIGH 50: CPT | Performed by: SPECIALIST

## 2022-12-06 PROCEDURE — 83735 ASSAY OF MAGNESIUM: CPT

## 2022-12-06 PROCEDURE — C2623 CATH, TRANSLUMIN, DRUG-COAT: HCPCS | Performed by: INTERNAL MEDICINE

## 2022-12-06 PROCEDURE — 77030040934 HC CATH DIAG DXTERITY MEDT -A: Performed by: INTERNAL MEDICINE

## 2022-12-06 PROCEDURE — C1894 INTRO/SHEATH, NON-LASER: HCPCS | Performed by: INTERNAL MEDICINE

## 2022-12-06 PROCEDURE — 4A023N7 MEASUREMENT OF CARDIAC SAMPLING AND PRESSURE, LEFT HEART, PERCUTANEOUS APPROACH: ICD-10-PCS | Performed by: INTERNAL MEDICINE

## 2022-12-06 PROCEDURE — 77030013715 HC INFL SYS MRTM -B: Performed by: INTERNAL MEDICINE

## 2022-12-06 PROCEDURE — 77030004538 HC CATH ANGI DX MP BSC -A: Performed by: INTERNAL MEDICINE

## 2022-12-06 PROCEDURE — 84484 ASSAY OF TROPONIN QUANT: CPT

## 2022-12-06 PROCEDURE — 74011000250 HC RX REV CODE- 250: Performed by: NURSE PRACTITIONER

## 2022-12-06 PROCEDURE — 83036 HEMOGLOBIN GLYCOSYLATED A1C: CPT

## 2022-12-06 PROCEDURE — 85025 COMPLETE CBC W/AUTO DIFF WBC: CPT

## 2022-12-06 PROCEDURE — 77030018729 HC ELECTRD DEFIB PAD CARD -B: Performed by: INTERNAL MEDICINE

## 2022-12-06 PROCEDURE — 36415 COLL VENOUS BLD VENIPUNCTURE: CPT

## 2022-12-06 PROCEDURE — 37224 HC PTA FEMPOP UNI: CPT | Performed by: INTERNAL MEDICINE

## 2022-12-06 PROCEDURE — B2111ZZ FLUOROSCOPY OF MULTIPLE CORONARY ARTERIES USING LOW OSMOLAR CONTRAST: ICD-10-PCS | Performed by: INTERNAL MEDICINE

## 2022-12-06 PROCEDURE — 84100 ASSAY OF PHOSPHORUS: CPT

## 2022-12-06 PROCEDURE — 77030012468 HC VLV BLEEDBK CNTRL ABBT -B: Performed by: INTERNAL MEDICINE

## 2022-12-06 PROCEDURE — 36245 INS CATH ABD/L-EXT ART 1ST: CPT | Performed by: INTERNAL MEDICINE

## 2022-12-06 RX ORDER — ONDANSETRON 4 MG/1
4 TABLET, ORALLY DISINTEGRATING ORAL
Status: DISCONTINUED | OUTPATIENT
Start: 2022-12-06 | End: 2022-12-08 | Stop reason: HOSPADM

## 2022-12-06 RX ORDER — SODIUM CHLORIDE 9 MG/ML
100 INJECTION, SOLUTION INTRAVENOUS CONTINUOUS
Status: DISCONTINUED | OUTPATIENT
Start: 2022-12-06 | End: 2022-12-06

## 2022-12-06 RX ORDER — POLYETHYLENE GLYCOL 3350 17 G/17G
17 POWDER, FOR SOLUTION ORAL DAILY PRN
Status: DISCONTINUED | OUTPATIENT
Start: 2022-12-06 | End: 2022-12-08 | Stop reason: HOSPADM

## 2022-12-06 RX ORDER — MIDAZOLAM HYDROCHLORIDE 1 MG/ML
INJECTION, SOLUTION INTRAMUSCULAR; INTRAVENOUS AS NEEDED
Status: DISCONTINUED | OUTPATIENT
Start: 2022-12-06 | End: 2022-12-06 | Stop reason: HOSPADM

## 2022-12-06 RX ORDER — HEPARIN SODIUM 1000 [USP'U]/ML
3540 INJECTION, SOLUTION INTRAVENOUS; SUBCUTANEOUS ONCE
Status: COMPLETED | OUTPATIENT
Start: 2022-12-06 | End: 2022-12-06

## 2022-12-06 RX ORDER — SODIUM CHLORIDE 9 MG/ML
500 INJECTION, SOLUTION INTRAVENOUS CONTINUOUS
Status: CANCELLED | OUTPATIENT
Start: 2022-12-06 | End: 2022-12-06

## 2022-12-06 RX ORDER — SODIUM CHLORIDE 0.9 % (FLUSH) 0.9 %
5-40 SYRINGE (ML) INJECTION EVERY 8 HOURS
Status: DISCONTINUED | OUTPATIENT
Start: 2022-12-06 | End: 2022-12-08 | Stop reason: HOSPADM

## 2022-12-06 RX ORDER — NITROGLYCERIN 0.4 MG/1
0.4 TABLET SUBLINGUAL
Status: DISCONTINUED | OUTPATIENT
Start: 2022-12-06 | End: 2022-12-08 | Stop reason: HOSPADM

## 2022-12-06 RX ORDER — MAGNESIUM SULFATE 100 %
4 CRYSTALS MISCELLANEOUS AS NEEDED
Status: DISCONTINUED | OUTPATIENT
Start: 2022-12-06 | End: 2022-12-07 | Stop reason: SDUPTHER

## 2022-12-06 RX ORDER — ONDANSETRON 2 MG/ML
4 INJECTION INTRAMUSCULAR; INTRAVENOUS
Status: DISCONTINUED | OUTPATIENT
Start: 2022-12-06 | End: 2022-12-08 | Stop reason: HOSPADM

## 2022-12-06 RX ORDER — LIDOCAINE HYDROCHLORIDE 10 MG/ML
INJECTION INFILTRATION; PERINEURAL AS NEEDED
Status: DISCONTINUED | OUTPATIENT
Start: 2022-12-06 | End: 2022-12-06 | Stop reason: HOSPADM

## 2022-12-06 RX ORDER — ACETAMINOPHEN 325 MG/1
650 TABLET ORAL
Status: CANCELLED | OUTPATIENT
Start: 2022-12-06

## 2022-12-06 RX ORDER — ISOSORBIDE MONONITRATE 30 MG/1
30 TABLET, EXTENDED RELEASE ORAL DAILY
Status: DISCONTINUED | OUTPATIENT
Start: 2022-12-06 | End: 2022-12-07

## 2022-12-06 RX ORDER — INSULIN LISPRO 100 [IU]/ML
INJECTION, SOLUTION INTRAVENOUS; SUBCUTANEOUS
Status: DISCONTINUED | OUTPATIENT
Start: 2022-12-06 | End: 2022-12-07 | Stop reason: SDUPTHER

## 2022-12-06 RX ORDER — ACETAMINOPHEN 325 MG/1
650 TABLET ORAL
Status: DISCONTINUED | OUTPATIENT
Start: 2022-12-06 | End: 2022-12-08 | Stop reason: HOSPADM

## 2022-12-06 RX ORDER — CLOPIDOGREL 300 MG/1
TABLET, FILM COATED ORAL AS NEEDED
Status: DISCONTINUED | OUTPATIENT
Start: 2022-12-06 | End: 2022-12-06 | Stop reason: HOSPADM

## 2022-12-06 RX ORDER — FUROSEMIDE 10 MG/ML
40 INJECTION INTRAMUSCULAR; INTRAVENOUS DAILY
Status: DISCONTINUED | OUTPATIENT
Start: 2022-12-06 | End: 2022-12-07

## 2022-12-06 RX ORDER — LANOLIN ALCOHOL/MO/W.PET/CERES
3 CREAM (GRAM) TOPICAL
Status: DISCONTINUED | OUTPATIENT
Start: 2022-12-06 | End: 2022-12-08 | Stop reason: HOSPADM

## 2022-12-06 RX ORDER — PROTAMINE SULFATE 10 MG/ML
INJECTION, SOLUTION INTRAVENOUS AS NEEDED
Status: DISCONTINUED | OUTPATIENT
Start: 2022-12-06 | End: 2022-12-06 | Stop reason: HOSPADM

## 2022-12-06 RX ORDER — FENTANYL CITRATE 50 UG/ML
INJECTION, SOLUTION INTRAMUSCULAR; INTRAVENOUS AS NEEDED
Status: DISCONTINUED | OUTPATIENT
Start: 2022-12-06 | End: 2022-12-06 | Stop reason: HOSPADM

## 2022-12-06 RX ORDER — SODIUM CHLORIDE 0.9 % (FLUSH) 0.9 %
5-40 SYRINGE (ML) INJECTION AS NEEDED
Status: CANCELLED | OUTPATIENT
Start: 2022-12-06

## 2022-12-06 RX ORDER — SODIUM CHLORIDE 0.9 % (FLUSH) 0.9 %
5-40 SYRINGE (ML) INJECTION AS NEEDED
Status: DISCONTINUED | OUTPATIENT
Start: 2022-12-06 | End: 2022-12-08 | Stop reason: HOSPADM

## 2022-12-06 RX ORDER — HEPARIN SODIUM 200 [USP'U]/100ML
INJECTION, SOLUTION INTRAVENOUS
Status: COMPLETED | OUTPATIENT
Start: 2022-12-06 | End: 2022-12-06

## 2022-12-06 RX ORDER — SODIUM CHLORIDE 9 MG/ML
INJECTION, SOLUTION INTRAVENOUS
Status: COMPLETED | OUTPATIENT
Start: 2022-12-06 | End: 2022-12-06

## 2022-12-06 RX ORDER — SODIUM CHLORIDE 0.9 % (FLUSH) 0.9 %
5-40 SYRINGE (ML) INJECTION EVERY 8 HOURS
Status: CANCELLED | OUTPATIENT
Start: 2022-12-06

## 2022-12-06 RX ORDER — HEPARIN SODIUM 1000 [USP'U]/ML
INJECTION, SOLUTION INTRAVENOUS; SUBCUTANEOUS AS NEEDED
Status: DISCONTINUED | OUTPATIENT
Start: 2022-12-06 | End: 2022-12-06 | Stop reason: HOSPADM

## 2022-12-06 RX ORDER — ACETAMINOPHEN 650 MG/1
650 SUPPOSITORY RECTAL
Status: DISCONTINUED | OUTPATIENT
Start: 2022-12-06 | End: 2022-12-08 | Stop reason: HOSPADM

## 2022-12-06 RX ORDER — METOPROLOL TARTRATE 50 MG/1
50 TABLET ORAL EVERY 12 HOURS
Status: DISCONTINUED | OUTPATIENT
Start: 2022-12-06 | End: 2022-12-07

## 2022-12-06 RX ORDER — ROPINIROLE 0.25 MG/1
0.25 TABLET, FILM COATED ORAL 3 TIMES DAILY
Status: DISCONTINUED | OUTPATIENT
Start: 2022-12-06 | End: 2022-12-08 | Stop reason: HOSPADM

## 2022-12-06 RX ADMIN — METOPROLOL TARTRATE 50 MG: 50 TABLET ORAL at 22:33

## 2022-12-06 RX ADMIN — Medication 2 UNITS: at 09:39

## 2022-12-06 RX ADMIN — METOPROLOL TARTRATE 25 MG: 25 TABLET, FILM COATED ORAL at 09:39

## 2022-12-06 RX ADMIN — SODIUM CHLORIDE, PRESERVATIVE FREE 10 ML: 5 INJECTION INTRAVENOUS at 11:47

## 2022-12-06 RX ADMIN — SODIUM CHLORIDE, PRESERVATIVE FREE 10 ML: 5 INJECTION INTRAVENOUS at 22:34

## 2022-12-06 RX ADMIN — SODIUM CHLORIDE, PRESERVATIVE FREE 10 ML: 5 INJECTION INTRAVENOUS at 05:05

## 2022-12-06 RX ADMIN — ALOGLIPTIN 12.5 MG: 12.5 TABLET, FILM COATED ORAL at 09:39

## 2022-12-06 RX ADMIN — ISOSORBIDE MONONITRATE 30 MG: 30 TABLET, EXTENDED RELEASE ORAL at 09:40

## 2022-12-06 RX ADMIN — Medication 2 UNITS: at 12:38

## 2022-12-06 RX ADMIN — SODIUM CHLORIDE, PRESERVATIVE FREE 10 ML: 5 INJECTION INTRAVENOUS at 01:07

## 2022-12-06 RX ADMIN — CLOPIDOGREL BISULFATE 75 MG: 75 TABLET ORAL at 09:39

## 2022-12-06 RX ADMIN — TAMSULOSIN HYDROCHLORIDE 0.4 MG: 0.4 CAPSULE ORAL at 09:39

## 2022-12-06 RX ADMIN — ROPINIROLE HYDROCHLORIDE 0.25 MG: 0.25 TABLET, FILM COATED ORAL at 22:33

## 2022-12-06 RX ADMIN — ROPINIROLE HYDROCHLORIDE 0.25 MG: 0.25 TABLET, FILM COATED ORAL at 11:46

## 2022-12-06 RX ADMIN — HEPARIN SODIUM 3540 UNITS: 1000 INJECTION INTRAVENOUS; SUBCUTANEOUS at 03:17

## 2022-12-06 RX ADMIN — ASPIRIN 81 MG: 81 TABLET, COATED ORAL at 09:39

## 2022-12-06 RX ADMIN — ROSUVASTATIN 20 MG: 10 TABLET, FILM COATED ORAL at 01:07

## 2022-12-06 RX ADMIN — Medication 2 UNITS: at 22:33

## 2022-12-06 NOTE — H&P
[address]  HISTORY AND PHYSICAL    Name:  Tiffany Herndon  MR#:  [id number]  :  1950  ACCOUNT #:  [de-identified]  ADMIT DATE:  2022      The patient was seen, evaluated, and admitted by me on 2022. PRIMARY CARE PHYSICIAN:  Julia Samaniego MD    SOURCE OF INFORMATION:  The patient and review of ED and old electronic medical record. CHIEF COMPLAINT:  Shortness of breath. HISTORY OF PRESENT ILLNESS:  This 60-year-old man with past medical history significant for coronary artery disease; status post CABG and stent placement, type 2 diabetes, dyslipidemia, hypertension, chronic kidney disease presented at the emergency room with shortness of breath. This has been going on for about a week. The shortness of breath is worse with mild exertion such as walking and also when the patient is lying down. The patient also complained of chest pain which is located at the center of the chest.  No radiation. Described as sharp pain. No known aggravating or relieving factors, 5/10 in severity. No associated fever, rigors, and chills. When the patient arrived at the emergency room, CTA of the chest was obtained and this was negative for pulmonary embolism. The patient was found to have markedly elevated troponin level. The patient was subsequently referred to the hospitalist service for admission. The patient was last admitted to the hospital from 2018 to 2018. The patient was admitted and underwent CABG for his coronary artery disease. The patient's most recent echocardiogram which was done last month shows ejection fraction of 45-50%. PAST MEDICAL HISTORY:  Coronary artery disease; status post CABG and stent placement, dyslipidemia, hypertension, and chronic kidney disease. ALLERGIES:  NO KNOWN DRUG ALLERGIES. MEDICATIONS:  1. Aspirin 81 mg daily. 2.  Plavix 75 mg daily. 3.  Lasix 20 mg daily. 4.  Glucotrol 5 mg daily.   5.  Hydrochlorothiazide 25 mg daily. 6.  Imdur 30 mg daily. 7.  Januvia 50 mg daily. 8.  Jardiance 25 mg daily in the morning. 9.  Glucophage 1000 mg twice daily. 10.  Crestor 20 mg daily. 11.  Flomax 0.4 mg daily. FAMILY HISTORY:  This was reviewed. His father had heart disease. His mother had hypertension. PAST SURGICAL HISTORY:  This is significant for CABG, cardiac stent placement, and appendectomy. SOCIAL HISTORY:  No history of tobacco abuse. The patient admits to social consumption of alcohol. REVIEW OF SYSTEMS:  HEAD, EYES, EARS, NOSE AND THROAT:  No headache, no dizziness, no blurring of vision, and no photophobia. RESPIRATORY SYSTEM:  This is positive for shortness of breath. No cough and no hemoptysis. CARDIOVASCULAR SYSTEM:  This is positive for chest pain and orthopnea. No palpitation. GASTROINTESTINAL SYSTEM:  No nausea or vomiting. No diarrhea and no constipation. GENITOURINARY SYSTEM:  No dysuria, no urgency, and no frequency. All other systems are reviewed and they are negative. PHYSICAL EXAMINATION:  GENERAL APPEARANCE:  The patient appeared ill and in moderate distress. VITAL SIGNS:  On arrival at the emergency room; temperature 98.1, pulse 122, respiratory rate 16, blood pressure 133/84, and oxygen saturation 95%. HEAD:  Normocephalic, atraumatic. EYES:  Normal eye movement. No redness, no drainage, and no discharge. EARS:  Normal external ears with no obvious drainage. NOSE:  No deformity and no drainage. MOUTH AND THROAT:  No visible oral lesion. NECK:  Neck is supple. Mild JVD. No thyromegaly. CHEST:  Bilateral basal crackles. No wheezing. HEART:  Normal S1 and S2, regular. No clinically appreciable murmur. ABDOMEN:  Soft and nontender. Normal bowel sounds. CNS:  Alert and oriented x3. No gross focal neurological deficit. EXTREMITIES:  No edema. Pulses 2+ bilaterally. MUSCULOSKELETAL SYSTEM:  No obvious joint deformity and swelling.   SKIN:  No active skin lesions seen in the exposed part of the body. PSYCHIATRY:  Normal mood and affect. LYMPHATIC SYSTEM:  No cervical lymphadenopathy. DIAGNOSTIC DATA:  The EKG shows sinus tachycardia and nonspecific ST and T-waves abnormalities concerning for ischemia. Chest x-ray shows trace bilateral pleural effusion with bibasilar atelectasis, pulmonary vascular congestion without overt pulmonary edema, and cholelithiasis. The CTA of the chest, no evidence of pulmonary embolism, probably mild pulmonary edema with bilateral small moderate pleural effusions. Multiple bilateral pulmonary nodules concerning for infectious or inflammatory. LABORATORY DATA:  Hematology; WBC 6.6, hemoglobin 11.6, hematocrit 38.5, and platelets 803. Chemistry; sodium 136, potassium 4.2, chloride 103, CO2 is 24, glucose 161, BUN 30, creatinine 1.73, calcium 9.6, total bilirubin 0.4, ALT 23, AST 18, alkaline phosphatase 96, total protein 9.6, albumin level 3.8, and hemoglobin 5.3. Cardiac profile, troponin high-sensitivity 2720. Coagulation profile; INR 1.0 and PT 10.8. Pro-BNP level 7764. D-dimer 1.53. Influenza A and B antigen negative. COVID-19 rapid test negative. ASSESSMENT:  1.  Non-ST-elevation myocardial infarction. 2.  Type 2 diabetes. 3.  Dyslipidemia. 4.  Hypertension. 5.  Elevated D-dimer. 6.  Chronic kidney disease stage III. 7.  Suspected acute combined systolic and diastolic heart failure. 8.  Pulmonary nodules. PLAN:  1.  Non-ST-elevation myocardial infarction. We will start the patient on full-dose heparin. We will continue with aspirin and Plavix. We will await further recommendation from the cardiologist consulted by the emergency room physician. The patient will be n.p.o. in anticipation of intervention by the cardiologist.  We will trend troponin level. The CTA of the chest is negative for pulmonary embolism. 2.  Type 2 diabetes. We will place the patient on sliding scale with insulin coverage.   We will hold Glucophage, especially since the patient was recently exposed to contrast dye until when the patient is discharged from the hospital.  Recheck hemoglobin A1c level if one has not been checked recently. 3.  Dyslipidemia. We will continue with preadmission medication. 4.  Hypertension. We will continue with preadmission medication and monitor the patient's blood pressure closely. 5.  Elevated D-dimer. CTA of the chest is negative for PE. We will check ultrasound of the lower extremity for DVT. 6.  Chronic kidney disease stage III. We will continue to monitor the patient's renal function. The patient may require Nephrology consult if this is getting worse. 7.  Acute combined systolic and diastolic heart failure. The most recent echocardiogram was done 10/27/2022 and shows ejection fraction of 45-50% with abnormal diastolic function. We will start the patient on Lasix. We will await further recommendation from the cardiologist.  8.  Pulmonary nodules. The patient is afebrile and not toxic. I do not believe that this is due to infection, but pulmonary consult will be requested to assist in further evaluation and treatment. 9.  Other issues. Code status, the patient is a full code. The patient is on full-dose heparin. Because of that there is no need for DVT prophylaxis. FUNCTIONAL STATUS PRIOR TO ADMISSION:  The patient came from home. The patient is ambulatory with no assistive device. COVID PRECAUTION:  The patient was wearing a face mask. I was wearing a face mask and gloves for this patient's encounter. Critical care was performed for this encounter. CRITICAL CARE ATTESTATION:   I had a face to face encounter with the patient, reviewed and interpreted patient data including clinical events, labs, images, vital signs, I/O's, and examined patient.   I have discussed the case and the plan and management of the patient's care with the consulting services, the bedside nurses and necessary ancillary providers. NOTE OF PERSONAL INVOLVEMENT IN CARE   This patient has a high probability of imminent, clinically significant deterioration, which requires the highest level of preparedness to intervene urgently. I participated in the decision-making and personally managed or directed the management of the following life and organ supporting interventions that required my frequent assessment to treat or prevent imminent deterioration. I personally spent 45 minutes of critical care time. This is time spent at this critically ill patient's bedside actively involved in patient care as well as the coordination of care and discussions with the patient's family. This does not include any procedural time which has been billed separately.      Rebekah Leon MD      [id number]  D:  12/06/2022 4:54  T:  12/06/2022 6:04  JOB #:  [id number]  CC:  Pritesh Wilder MD

## 2022-12-06 NOTE — PROGRESS NOTES
[facility]  Hospitalist Group                                                                                          Hospitalist Progress Note  Leah Roe MD  Answering service: [phone] from in house phone        Date of Service:  2022  NAME:  Mike Maldonado  :  1950  MRN:  [id number]      Admission Summary: This 69-year-old man with past medical history significant for coronary artery disease; status post CABG and stent placement, type 2 diabetes, dyslipidemia, hypertension, chronic kidney disease presented at the emergency room with shortness of breath. This has been going on for about a week. The shortness of breath is worse with mild exertion such as walking and also when the patient is lying down. The patient also complained of chest pain which is located at the center of the chest.  No radiation. Described as sharp pain. No known aggravating or relieving factors, 5/10 in severity. No associated fever, rigors, and chills. When the patient arrived at the emergency room, CTA of the chest was obtained and this was negative for pulmonary embolism. The patient was found to have markedly elevated troponin level. The patient was subsequently referred to the hospitalist service for admission. The patient was last admitted to the hospital from 2018 to 2018. The patient was admitted and underwent CABG for his coronary artery disease. The patient's most recent echocardiogram which was done last month shows ejection fraction of 45-50%. Interval history / Subjective:   Patient seen and examined complains of restless leg syndrome leg pain  Patient admitted with NSTEMI has extensive history of CABG and status post stent as well in 2022 patient is scheduled for cath we will follow-up     Assessment & Plan:     1. Non-ST-elevation myocardial infarction.     --Seen by cardiology scheduled for cath has extensive coronary artery disease history with CABG and stent   -- Currently any chest pain free patient is on heparin drip scheduled cath this afternoon     2. Type 2 diabetes. --We will place the patient on sliding scale with insulin coverage. We will hold Glucophage, --check A1c    3. Dyslipidemia. We will continue with preadmission medication. 4.  Hypertension. We will continue with preadmission medication   5. Elevated D-dimer. CTA of the chest is negative for PE. We will check ultrasound of the lower extremity for DVT. 6.  Chronic kidney disease stage III. We will continue to monitor the patient's renal function. The patient may require Nephrology consult if this is getting worse. May need to watch after cath after dye exposure    7. Acute combined systolic and diastolic heart failure. -- The most recent echocardiogram was done 10/27/2022 and shows ejection fraction of 45-50% with abnormal diastolic function. We will start the patient on Lasix. We will await further recommendation from the cardiologist.    8.  Pulmonary nodules. The patient is afebrile and not toxic. Follow-up as an outpatient     Code status: Full code  DVT prophylaxis: On heparin drip    Care Plan discussed with: Patient/Family and Nurse  Anticipated Disposition: Home w/Family  Anticipated Discharge: 24 hours to 48 hours    CRITICAL CARE ATTESTATION:  I had a face to face encounter with the patient, reviewed and interpreted patient data including clinical events, labs, images, vital signs, I/O's, and examined patient. I have discussed the case and the plan and management of the patient's care with the consulting services, the bedside nurses and necessary ancillary providers. NOTE OF PERSONAL INVOLVEMENT IN CARE   This patient has a high probability of imminent, clinically significant deterioration, which requires the highest level of preparedness to intervene urgently.  I participated in the decision-making and personally managed or directed the management of the following life and organ supporting interventions that required my frequent assessment to treat or prevent imminent deterioration. I personally spent 35 minutes of critical care time. This is time spent at this critically ill patient's bedside actively involved in patient care as well as the coordination of care and discussions with the patient's family. This does not include any procedural time which has been billed separately. Hospital Problems  Date Reviewed: 12/5/2022            Codes Class Noted POA    * (Principal) NSTEMI (non-ST elevated myocardial infarction) [facility]) ICD-10-CM: I21.4  ICD-9-CM: 410.70  12/5/2022 Yes             Review of Systems:   A comprehensive review of systems was negative. Vital Signs:    Last 24hrs VS reviewed since prior progress note.  Most recent are:  Visit Vitals  /69 (BP 1 Location: Left upper arm, BP Patient Position: At rest)   Pulse 93   Temp 97.9 °F (36.6 °C)   Resp 20   Ht 5' 9\" (1.753 m)   Wt 59 kg (130 lb)   SpO2 99%   BMI 19.20 kg/m²         Intake/Output Summary (Last 24 hours) at 12/6/2022 1008  Last data filed at 12/6/2022 2356  Gross per 24 hour   Intake --   Output 1700 ml   Net -1700 ml        Physical Examination:     I had a face to face encounter with this patient and independently examined them on 12/6/2022 as outlined below:          General : alert x 3, awake, no acute distress, resting in bed, pleasant   HEENT: PEERL, EOMI, moist mucus membrane, TM clear  Neck: supple, no JVD, no meningeal signs  Chest: Clear to auscultation bilaterally   CVS: S1 S2 heard, Capillary refill less than 2 seconds  Abd: soft/ Non tender, non distended, BS physiological,   Ext: no clubbing, no cyanosis, no edema, brisk 2+ DP pulses  Neuro/Psych: pleasant mood and affect, CN 2-12 grossly intact, sensory grossly within normal limit, Strength 5/5 in all extremities, DTR 1+ x 4  Skin: warm     Data Review:    I personally reviewed  Image and labs      Labs:     Recent Labs     12/06/22  0353 12/05/22 2200   WBC 5.9 6.2   HGB 9.9* 10.1*   HCT 32.5* 34.4*    332     Recent Labs     12/06/22  0353 12/05/22  1618    136   K 4.2 4.2    103   CO2 23 24   BUN 36* 30*   CREA 1.70* 1.71*   * 161*   CA 9.4 9.6   MG 2.4  --    PHOS 5.0*  --      Recent Labs     12/06/22  0353 12/05/22  1618   ALT 21 23   AP 77 96   TBILI 0.3 0.4   TP 7.7 9.1*   ALB 3.4* 3.8   GLOB 4.3* 5.3*     Recent Labs     12/05/22 2200 12/05/22 1618   INR  --  1.0   PTP  --  10.8   APTT 25.1  --       No results for input(s): FE, TIBC, PSAT, FERR in the last 72 hours. Lab Results   Component Value Date/Time    Folate 10.5 07/03/2018 09:24 AM      No results for input(s): PH, PCO2, PO2 in the last 72 hours. No results for input(s): CPK, CKNDX, TROIQ in the last 72 hours.     No lab exists for component: CPKMB  Lab Results   Component Value Date/Time    Cholesterol, total 143 10/12/2022 09:10 AM    HDL Cholesterol 49 10/12/2022 09:10 AM    LDL, calculated 41.4 10/12/2022 09:10 AM    Triglyceride 263 (H) 10/12/2022 09:10 AM    CHOL/HDL Ratio 2.9 10/12/2022 09:10 AM     Lab Results   Component Value Date/Time    Glucose (POC) 178 (H) 12/06/2022 09:15 AM    Glucose (POC) 251 (H) 02/28/2022 12:28 PM    Glucose (POC) 345 (H) 09/08/2021 03:09 PM    Glucose (POC) 416 (H) 09/08/2021 01:22 PM    Glucose (POC) 89 07/18/2018 06:49 AM     Lab Results   Component Value Date/Time    Color YELLOW/STRAW 01/20/2021 08:56 AM    Appearance CLEAR 01/20/2021 08:56 AM    Specific gravity 1.015 01/20/2021 08:56 AM    Specific gravity 1.020 05/03/2014 08:18 AM    pH (UA) 5.5 01/20/2021 08:56 AM    Protein 300 (A) 01/20/2021 08:56 AM    Glucose Negative 01/20/2021 08:56 AM    Ketone Negative 01/20/2021 08:56 AM    Bilirubin Negative 01/20/2021 08:56 AM    Urobilinogen 0.2 01/20/2021 08:56 AM    Nitrites Negative 01/20/2021 08:56 AM    Leukocyte Esterase Negative 01/20/2021 08:56 AM Epithelial cells FEW 01/20/2021 08:56 AM    Bacteria Negative 01/20/2021 08:56 AM    WBC 0-4 01/20/2021 08:56 AM    RBC 0-5 01/20/2021 08:56 AM         Medications Reviewed:     Current Facility-Administered Medications   Medication Dose Route Frequency    furosemide (LASIX) injection 40 mg  40 mg IntraVENous DAILY    isosorbide mononitrate ER (IMDUR) tablet 30 mg  30 mg Oral DAILY    nitroglycerin (NITROSTAT) tablet 0.4 mg  0.4 mg SubLINGual Q5MIN PRN    sodium chloride (NS) flush 5-40 mL  5-40 mL IntraVENous Q8H    sodium chloride (NS) flush 5-40 mL  5-40 mL IntraVENous PRN    acetaminophen (TYLENOL) tablet 650 mg  650 mg Oral Q6H PRN    Or    acetaminophen (TYLENOL) suppository 650 mg  650 mg Rectal Q6H PRN    polyethylene glycol (MIRALAX) packet 17 g  17 g Oral DAILY PRN    ondansetron (ZOFRAN ODT) tablet 4 mg  4 mg Oral Q8H PRN    Or    ondansetron (ZOFRAN) injection 4 mg  4 mg IntraVENous Q6H PRN    insulin lispro (HUMALOG) injection   SubCUTAneous AC&HS    glucose chewable tablet 16 g  4 Tablet Oral PRN    glucagon (GLUCAGEN) injection 1 mg  1 mg IntraMUSCular PRN    dextrose 10 % infusion 0-250 mL  0-250 mL IntraVENous PRN    rOPINIRole (REQUIP) tablet 0.25 mg  0.25 mg Oral TID    metoprolol tartrate (LOPRESSOR) tablet 25 mg  25 mg Oral Q12H    aspirin delayed-release tablet 81 mg  81 mg Oral DAILY    clopidogreL (PLAVIX) tablet 75 mg  75 mg Oral DAILY    rosuvastatin (CRESTOR) tablet 20 mg  20 mg Oral QHS    tamsulosin (FLOMAX) capsule 0.4 mg  0.4 mg Oral DAILY    alogliptin (NESINA) tablet 12.5 mg  12.5 mg Oral DAILY    metoprolol (LOPRESSOR) injection 5 mg  5 mg IntraVENous Q6H PRN    heparin 25,000 units in D5W 250 ml infusion  12-25 Units/kg/hr IntraVENous TITRATE     ______________________________________________________________________  EXPECTED LENGTH OF STAY: 2d 12h  ACTUAL LENGTH OF STAY:          1      Please note that this dictation was completed with Zenia, the computer voice recognition software. Quite often unanticipated grammatical, syntax, homophones, and other interpretive errors are inadvertently transcribed by the computer software. Please disregard these errors. Please excuse any errors that have escaped final proofreading.                 Alpha Adjutant, MD

## 2022-12-06 NOTE — PROGRESS NOTES
Bedside and Verbal shift change report given to Rae Sandy (oncoming nurse) by Jacky Oleary (offgoing nurse).  Report included the following information SBAR, Kardex, MAR, Accordion, Recent Results, Med Rec Status, and Cardiac Rhythm NSR to ST . Consent 2/Introductory Paragraph: Mohs surgery was explained to the patient and consent was obtained. The risks, benefits and alternatives to therapy were discussed in detail. Specifically, the risks of infection, scarring, bleeding, prolonged wound healing, incomplete removal, allergy to anesthesia, nerve injury and recurrence were addressed. Prior to the procedure, the treatment site was clearly identified and confirmed by the patient. All components of Universal Protocol/PAUSE Rule completed.

## 2022-12-06 NOTE — PROGRESS NOTES
Transition of Care Plan  RUR 12%    Disposition  Home with wife    Transportation  wife/family    [address]   Serviced by [facility] in 2018  Would use agency again if needed    Medical follow  PCP /specialist    Contact  Wife  Claudia Dixon [phone] mobile  Daughter Max Goldsmith [phone]    Reason for Admission:  NSTEMI  Hx of CABG and stent 2018, diabetes, CAD, hypertension                   RUR Score:     12%                Plan for utilizing home health:    not indicated but will arrange if ordered--       PCP: First and Last name:  Darren Mccullough MD     Name of Practice: [address] [place] [address]   Are you a current patient: Yes/No: yes   Approximate date of last visit:  a month ago   10/22/22   Can you participate in a virtual visit with your PCP:                     Current Advanced Directive/Advance Care Plan: Full Code      Healthcare Decision Maker:   Click here to complete [address] including selection of the Healthcare Decision Maker Relationship (ie \"Primary\")             Primary Decision Maker: Sirena Steven - [phone]    Secondary Decision Maker: Swati Olvera - Daughter - [phone]                  Transition of Care Plan:    Home with family and medical follow up      CM met with patient in his room to introduce self and explain role. Patient was alert and oriented and pleasant. Patient confirmed demographics, PCP and insurance  CCCP Medcare/Medicaid   Secures medications at [facility]    Patient lives in two level home with his wife. He was self care and independent prior to admission with adl's and iadl's using no dme. He and his wife do drive. Patient and wife have two adult children-- daughter in [place] and son in [address].         Patient plans to return home when discharged   He said if ordered, he would be in agreement with [facility] and would like to use [facility]    Patient signed 1st Medicare letter 12/5/22    CM to follow for transition of care needs and will make referrals as needed           Care Management Interventions  PCP Verified by CM: Yes  Last Visit to PCP: 11/22/22  Palliative Care Criteria Met (RRAT>21 & CHF Dx)?: No  Mode of Transport at Discharge: Other (see comment) (family in car)  Transition of Care Consult (CM Consult):  Other  Discharge Durable Medical Equipment: No  Physical Therapy Consult: No  Occupational Therapy Consult: No  Speech Therapy Consult: No  Support Systems: Other Family Member(s), Child(sebastián), Spouse/Significant Other  Confirm Follow Up Transport: Self (family)  Discharge Location  Patient Expects to be Discharged to[de-identified] Home

## 2022-12-06 NOTE — PROGRESS NOTES
Patient's heparin gtt subtherapeutic. Floor pharmacist advised to increase gtt, but hold bolus d/t patient being transported to the cath lab now.

## 2022-12-06 NOTE — PROGRESS NOTES
TRANSFER - IN REPORT:    Verbal report received from [facility] RN(name) on Inis Hum  being received from Room 409(unit) for routine progression of care      Report consisted of patients Situation, Background, Assessment and   Recommendations(SBAR). Information from the following report(s) MAR, Recent Results, and Cardiac Rhythm NSR  was reviewed with the receiving nurse. Opportunity for questions and clarification was provided. Assessment completed upon patients arrival to unit and care assumed.

## 2022-12-06 NOTE — ED NOTES
TRANSFER - OUT REPORT:    Verbal report given to Eliseo George (name) on Nancy Pascal  being transferred to [facility] (unit) for routine progression of care       Report consisted of patients Situation, Background, Assessment and   Recommendations(SBAR). Information from the following report(s) SBAR, Kardex, ED Summary, MAR, and Recent Results was reviewed with the receiving nurse. Lines:   Peripheral IV 12/05/22 Right Antecubital (Active)   Site Assessment Clean, dry, & intact 12/05/22 1628   Phlebitis Assessment 0 12/05/22 1628   Infiltration Assessment 0 12/05/22 1628   Dressing Status Clean, dry, & intact 12/05/22 1628   Dressing Type 4 X 4;Transparent 12/05/22 1628   Hub Color/Line Status Pink;Flushed;Patent 12/05/22 1628   Action Taken Blood drawn 12/05/22 1628   Alcohol Cap Used No 12/05/22 1628        Opportunity for questions and clarification was provided.       Patient transported with:   Registered Nurse

## 2022-12-06 NOTE — CARDIO/PULMONARY
Cardiac Rehab: MI education folder to bedside of Maci Mcgowan. Educated using teach back method. Reviewed MI diagnosis definition and purpose of intervention. Discussed risk factors for CAD to include the following: family history, elevated BMI, hyperlipidemia, hypertension, diabetes, stress, and smoking. Discussed Heart Healthy/Low Sodium (2000 mg) diet. .  Emphasized the value of cardiac rehab. Discussed Cardiac Rehab Program format, benefits, and encouraged enrollment to assist with risk modification and management. Maci Mcgowan has enrolled previously for 4 visits only. Will follow for cath results and place referral. Contact information for the OP CR at \[facility]\"" is on his AVS.      Neal Kendall verbalized understanding with questions answered.   Reinier Huang RN

## 2022-12-06 NOTE — PROCEDURES
Brief Procedure note    Findings:  1) High normal LVEDP  2) Severe native 3 vessel coronary artery disease. Significant diffuse disease in Lcx system with ISR. Moderate left main ostial disease. Patent LIMA to LAD as well as VG to RCA(ostial 50% stenosis)  3)Severe right femoral disease with mid SFA 99% stenosis treated with DCB angioplasty 6 mm INpact balloon with residual 40% stenosis Small non flow limiting dissection. Contrast: cc  Access: Left femora US guided    Recommendations  1)GDMT for secondary prevention  2)LCx and left main disease best treated with OMT and cardiac rehab.   3) Continue Plavix based DAPT

## 2022-12-06 NOTE — PROGRESS NOTES
Cardiac Cath Lab Procedure Area Arrival Note:    Ora Cloud arrived to Cardiac Cath Lab, Procedure Area. Patient identifiers verified with NAME and DATE OF BIRTH. Procedure verified with patient. Consent forms verified. Allergies verified. Patient informed of procedure and plan of care. Questions answered with review. Patient voiced understanding of procedure and plan of care. Patient on cardiac monitor, non-invasive blood pressure, SPO2 monitor. On RA. IV of NS on pump at 50 ml/hr. Patient status doing well without problems. Patient is A&Ox 4. Patient reports CP and SOB. Patient medicated during procedure with orders obtained and verified by Dr. Yesenia Sahu. Refer to patients Cardiac Cath Lab PROCEDURE REPORT for vital signs, assessment, status, and response during procedure, printed at end of case. Printed report on chart or scanned into chart. Transfer to [address] [place] [address] from Procedure Area    Verbal report given to Jordyn on Ora Cloud being transferred to Cardiac Cath Lab  for routine progression of care   Patient is post LHC procedure. Patient stable upon transfer to . Report consisted of patients Situation, Background, Assessment and   Recommendations(SBAR). Information from the following report(s) SBAR, Kardex, Procedure Summary, Intake/Output, MAR, Recent Results, and Med Rec Status was reviewed with the receiving nurse. Opportunity for questions and clarification was provided. Patient medicated during procedure with orders obtained and verified by Dr. Lena Francis. Refer to patient PROCEDURE REPORT for vital signs, assessment, status, and response during procedure.

## 2022-12-06 NOTE — PROGRESS NOTES
TRANSFER - OUT REPORT:    Verbal report given to [address] (name) on Serene Bingham  being transferred to Cath Lab (unit) for routine progression of care       Report consisted of patients Situation, Background, Assessment and   Recommendations(SBAR). Information from the following report(s) SBAR, Kardex, Intake/Output, MAR, Recent Results, and Cardiac Rhythm SR  was reviewed with the receiving nurse. Lines:   Peripheral IV 12/05/22 Right Antecubital (Active)   Site Assessment Clean, dry, & intact 12/06/22 0400   Phlebitis Assessment 0 12/06/22 0400   Infiltration Assessment 0 12/06/22 0400   Dressing Status Clean, dry, & intact 12/06/22 0400   Dressing Type 4 X 4;Transparent 12/06/22 0400   Hub Color/Line Status Pink;Flushed;Patent 12/06/22 0400   Action Taken Open ports on tubing capped 12/06/22 0400   Alcohol Cap Used Yes 12/06/22 0400        Opportunity for questions and clarification was provided.       Patient transported with:   Registered Nurse

## 2022-12-06 NOTE — PROGRESS NOTES
[facility]                       Cardiology Care Note     []Initial Consult     [x]Progress note    Patient Name: Heladio Olea QDA:[id number] - ZOX:[id number]  Primary Cardiologist: [facility] Physicians: Shola Marrufo MD  Consulting Cardiologist: [facility] Physicians: Shola Marrufo MD     Assessment/Plan/Discussion:Cardiology Attending:     Patient seen on the day of progress note, examined and reviewed  with Nurse Practitioner. A/P:  Cath reviewed personally in labs with IV, medical manage  CHF LVEDP 18-20  Optimize CHF meds    S:same SOB, some cough , pain in legs  Hgb 9.9 Cr 1.7 LDL 41  O: VS reviewed   Patient Vitals for the past 4 hrs:   Temp Pulse Resp BP SpO2   12/06/22 2209 -- 99 -- -- --   12/06/22 2017 -- 98 -- -- --   12/06/22 1927 97.8 °F (36.6 °C) (!) 103 20 95/81 99 %        NC AT no JVD, clear lungs, RRR smrg, no edema  Shola Marrufo MD   This note was created using voice recognition software. Despite editing, there may be syntax errors. Reason for consult: chest pain/SOB    HPI:   CAD with CABG 6/9/2018. NSTEMI in November 2016 and resolved pulmonary HTN. Stent to circumflex an LAD in 2016. Stress echo in 2018 with a drop in BP with exercise and a drop in EF. Cath on 6/22/18 that showed even with a 5F catheter, he dampened with suspected ostial 3 vessel disease. Echo 3/27/2021 = EF 55 to 60% mild AR MR TR LAE MAC  Nuclear 3/18/2021 EF 64% medium size defect apical and inferior lateral reversible ischemia medium defect mid and inferolateral nonreversible appear to be infarction intermediate risk stress test .  PCI 02/28/2022--patent LIMA to LAD, vein graft to distal RCA with severe disease in the native vessels Occluded vein graft to OM 2. Native OM 2 severely diseased along with proximal to mid circumflex as well as distal left main.   Overall the vessel is significantly remodeled negatively. Additionally there is significant disease in the first marginal branch which is even smaller as well as AV groove branch right at the takeoff of OM2. Single stent 2.25 x 28 mm Xience was placed extending from the OM 2 into the circumflex into the distal left main and sequentially dilated with 2.25 noncompliant, 2 5 noncompliant, 3 oh noncompliant and 3 5 noncompliant to perform multilevel POT to manage multiple bifurcations on the way. Atherectomy was considered but given small size of the vessels, was not deemed to be absolutely safe for the obtuse marginal lesion. Overall stent expanded fairly well related to the size of the vessels. Normal LVEDP      SUBJECTIVE:  2 months ago was getting tired at work and had dizziness with systolic murmur. We stopped his Coreg and he came back and saw me 2 weeks ago. He says he got a pain about once a week lasting 5 to 10 minutes. His wife then called and he did 2 last week and I spoke to them he started having increasing shortness of breath and some chest pain. He comes the office today with his wife. He reports for 1 week he has had increasing shortness of breath. 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 Friday he had chest pain and he took 1 nitro with relief but then had further chest pain with exertion. He did not notice a difference when we stopped the Coreg in terms of fatigue. He denies fever. Since admission he is denying symptoms at rest, though having some nocturnal cramping in isacc legs which has been an ongoing issue. He presented tachycardic with HR in 120s     Assessment and Plan     1. Coronary artery disease involving native coronary artery of native heart with unstable angina pectoris  Angina had resolved with latest stenting to OM. Has fatigue, not better off of the beta-blocker  CABG 2018. cath 9/8/21 open LIMA.    PCI to OM2 back up LM on 2/28/2022  Now with fatigue, labs echo and nuke reviewed   Now occ pain went over NTG use, some reversible. Presented with reported shortness of breath at very short distance in addition to chest pain. Concerns for [place]. Today reports at rest he has not had symptoms. For cardiac cath today. Cont on Imdur, Heparin IV, Metoprolol, Plavix, ASA, statin. 2.  Cardiomyopathy/systolic congestive heart failure acute on chronic  After cardiac cath will plan to start start Coreg and Entresto. Patient is already on Jardiance. Can stop Lasix and HCTZ and use Aldactone if potassium tolerates with the Entresto. 3. Tachycardia: improved with BB.     4. Hypertension, essential  At goal , meds and possible side effects reviewed and patient denies    5. HLD: cont statin    6. DM: cont on insulin while inpt, at home on Metformin and reportedly Jardiance. 7. Nocturnal cramping: per MARIANELA 10/24/22 severe stenosis right mid superficial femoral artery, near 50% stenosis of right distal superficial femoral artery, mod stenosis right dist pop, less then 50% stenosis arteries in left leg. Follow up with vascular.          ____________________________________________________________    Cardiac testing  10/27/22    ECHO ADULT COMPLETE 10/27/2022 10/27/2022    Interpretation Summary    Left Ventricle: Mildly reduced left ventricular systolic function with a visually estimated EF of 45 - 50%. Left ventricle size is normal. Normal wall thickness. Normal wall motion. Abnormal diastolic function. Right Ventricle: Reduced systolic function. TAPSE is 1.2 cm. Aortic Valve: Valve structure is normal. Mild sclerosis of the aortic valve cusp. Mild annular calcification. Mild regurgitation. Mild stenosis of the aortic valve. AV mean gradient is 7 mmHg. AV peak gradient is 13 mmHg. LVOT:AV VTI Index is 0.55. AV area by peak velocity is 1.4 cm2. Mitral Valve: Moderately thickened leaflet. Mild annular calcification of the mitral valve. The posterior leaflet of the mitral valve has restricted mobility. Mild regurgitation. Left Atrium: Left atrium is dilated. Signed by: Steffen Haley MD on 10/27/2022  4:49 PM      10/27/22    NUCLEAR CARDIAC STRESS TEST 10/27/2022 11/1/2022    Interpretation Summary    Nuclear Findings: The study is positive for myocardial ischemia. The defect appears to be ischemia. The areas of ischemia are similar to 3/15/2021 study. Nuclear Findings: There is a moderate severity left ventricular stress perfusion defect that is medium in size present in the mid to distal inferolateral and anterolateral segment(s) that is reversible. The defect is consistent with abnormal perfusion in the LCx territory. There is normal wall motion in the defect area. The defect appears to be probable ischemia. There is a moderate severity second left ventricular stress perfusion defect. The defect appears to be infarction. Nuclear Findings: Abnormal left ventricular systolic function post-stress. Septal dyskinesis. Post-stress ejection fraction is 39%. ECG: Resting ECG demonstrates normal sinus rhythm. ECG: Stress ECG was negative for ischemia. Stress Test: A pharmacological stress test was performed using lexiscan. Blood pressure demonstrated a normal response and heart rate demonstrated a normal response to stress. The patient's heart rate recovery was normal. The patient reported no symptoms during the stress test.    Signed by: Steffen Haley MD on 10/27/2022  4:45 PM    02/28/22    CARDIAC PROCEDURE 02/28/2022 2/28/2022    Conclusion  Findings  1. Severe native multivessel coronary disease  2. Patent LIMA to LAD, vein graft to distal RCA with severe disease in the native vessels  3. Occluded vein graft to OM 2. Native OM 2 severely diseased along with proximal to mid circumflex as well as distal left main. Overall the vessel is significantly remodeled negatively. Additionally there is significant disease in the first marginal branch which is even smaller as well as AV groove branch right at the takeoff of OM2. Single stent 2.25 x 28 mm Xience was placed extending from the OM 2 into the circumflex into the distal left main and sequentially dilated with 2.25 noncompliant, 2 5 noncompliant, 3 oh noncompliant and 3 5 noncompliant to perform multilevel POT to manage multiple bifurcations on the way. Atherectomy was considered but given small size of the vessels, was not deemed to be absolutely safe for the obtuse marginal lesion. Overall stent expanded fairly well related to the size of the vessels. 4.  Normal LVEDP    Access right radial: Small vessel, tortuous} brachiocephalic  Contrast 65 cc    Recommendations  1. Plavix based dual antiplatelet therapy  2. Guideline directed medical therapy for secondary prevention of coronary events    Signed by: Mary Weber MD on 2/28/2022  4:22 PM        Most recent HS troponins:  Recent Labs     12/06/22  0353 12/05/22  1618   TROPHS 2,591* 2,720*       ECG:  Sinus tachycardia, ST more depressed in lateral leads, T wave inversion inferior and anterolateral leads. Review of Systems:    [x]All other systems reviewed and all negative except as written in HPI    [] Patient unable to provide secondary to condition    Past Medical History:   Diagnosis Date    CAD (coronary artery disease) 11/10/2016    NSTEMI & 2 stents    Deafness 10/28/2012    DM (diabetes mellitus) ([facility] [address]     Elevated cholesterol     Hypertension     NSTEMI (non-ST elevated myocardial infarction) ([facility] [address] 11/10/2016     Past Surgical History:   Procedure Laterality Date    COLONOSCOPY N/A 6/28/2018    COLONOSCOPY performed by Haroon Alonzo MD at [address]  11/11/2016    2 stents     Social Hx:  reports that he has never smoked. He has never been exposed to tobacco smoke.  He has never used smokeless tobacco. He reports current alcohol use of about 2.0 standard drinks per week. He reports that he does not use drugs. Family Hx: family history includes Elevated Lipids in his brother, brother, and brother; Heart Attack in his father; Heart Disease in his father; Hypertension in his mother; No Known Problems in his daughter, sister, and son. No Known Allergies       OBJECTIVE:  Wt Readings from Last 3 Encounters:   12/05/22 59 kg (130 lb)   12/05/22 59.1 kg (130 lb 3.2 oz)   11/16/22 61.2 kg (135 lb)       Intake/Output Summary (Last 24 hours) at 12/6/2022 1301  Last data filed at 12/6/2022 0322  Gross per 24 hour   Intake --   Output 1700 ml   Net -1700 ml       Physical Exam:    Vitals:   Vitals:    12/06/22 0605 12/06/22 0727 12/06/22 0942 12/06/22 1019   BP:  116/64 124/69    Pulse: 85 94 93 96   Resp:  21 20    Temp:  98 °F (36.7 °C) 97.9 °F (36.6 °C)    SpO2:  99% 99%    Weight:       Height:         Telemetry: normal sinus rhythm    Gen: Well-developed, well-nourished, in no acute distress  Neck: Supple, No JVD, No Carotid Bruit  Resp: No accessory muscle use, Clear breath sounds, No rales or rhonchi  Card: Regular Rate,Rythm, Normal S1, S2, No murmurs, rubs or gallop. No thrills. Abd:   Soft, non-tender, non-distended, BS+   MSK: No cyanosis  Skin: No rashes    Neuro: Moving all four extremities, follows commands appropriately  Psych: Good insight, oriented to person, place, alert, Nml Affect  LE: No edema    Data Review:     Radiology:   XR Results (most recent):  Results from Hospital Encounter encounter on 12/05/22    XR CHEST PA LAT    Narrative  EXAM:  XR CHEST PA LAT    INDICATION:   shortness of breath    COMPARISON: Chest radiograph 6/13/2018. FINDINGS: PA and lateral radiographs of the chest were obtained. Trace bilateral pleural effusions with bibasilar atelectasis. Pulmonary vascular  congestion without overt pulmonary edema. No pneumothorax. Stable postsurgical  changes of CABG.  Heart size is within normal limits. No acute osseous  abnormality. Cholelithiasis. Impression  1. Trace bilateral pleural effusions with bibasilar atelectasis. 2. Pulmonary vascular congestion without overt pulmonary edema. 3. Cholelithiasis. Recent Labs     12/06/22  0353 12/05/22  1618    136   K 4.2 4.2    103   CO2 23 24   BUN 36* 30*   CREA 1.70* 1.71*   * 161*   PHOS 5.0*  --    CA 9.4 9.6     Recent Labs     12/06/22  0353 12/05/22  2200   WBC 5.9 6.2   HGB 9.9* 10.1*   HCT 32.5* 34.4*    332     Recent Labs     12/06/22  0353 12/05/22  1618   PTP  --  10.8   INR  --  1.0   AP 77 96     No results for input(s): CHOL, LDLC in the last 72 hours.     No lab exists for component: TGL, HDLC,  HBA1C      Current meds:    Current Facility-Administered Medications:     furosemide (LASIX) injection 40 mg, 40 mg, IntraVENous, DAILY, Jorge Garrett MD    isosorbide mononitrate ER (IMDUR) tablet 30 mg, 30 mg, Oral, DAILY, Jorge Garrett MD, 30 mg at 12/06/22 0940    nitroglycerin (NITROSTAT) tablet 0.4 mg, 0.4 mg, SubLINGual, Q5MIN PRN, Jorge Garrett MD    sodium chloride (NS) flush 5-40 mL, 5-40 mL, IntraVENous, Q8H, Jorge Garrett MD, 10 mL at 12/06/22 1147    sodium chloride (NS) flush 5-40 mL, 5-40 mL, IntraVENous, PRN, Jorge Garrett MD    acetaminophen (TYLENOL) tablet 650 mg, 650 mg, Oral, Q6H PRN **OR** acetaminophen (TYLENOL) suppository 650 mg, 650 mg, Rectal, Q6H PRN, Jorge Garrett MD    polyethylene glycol (MIRALAX) packet 17 g, 17 g, Oral, DAILY PRN, Jorge Garrett MD    ondansetron (ZOFRAN ODT) tablet 4 mg, 4 mg, Oral, Q8H PRN **OR** ondansetron (ZOFRAN) injection 4 mg, 4 mg, IntraVENous, Q6H PRN, Jorge Garrett MD    insulin lispro (HUMALOG) injection, , SubCUTAneous, AC&HS, Jorge Garrett MD, 2 Units at 12/06/22 1238    glucose chewable tablet 16 g, 4 Tablet, Oral, PRN, Jorge Garrett MD    glucagon (GLUCAGEN) injection 1 mg, 1 mg, IntraMUSCular, PRN, Zak Bajwa MD    dextrose 10 % infusion 0-250 mL, 0-250 mL, IntraVENous, PRN, Jorge Garrett MD    rOPINIRole (REQUIP) tablet 0.25 mg, 0.25 mg, Oral, TID, Austin García MD, 0.25 mg at 12/06/22 1146    metoprolol tartrate (LOPRESSOR) tablet 25 mg, 25 mg, Oral, Q12H, Jorge Garrett MD, 25 mg at 12/06/22 0939    aspirin delayed-release tablet 81 mg, 81 mg, Oral, DAILY, Jorge Garrett MD, 81 mg at 12/06/22 0939    clopidogreL (PLAVIX) tablet 75 mg, 75 mg, Oral, DAILY, Jorge Garrett MD, 75 mg at 12/06/22 0939    rosuvastatin (CRESTOR) tablet 20 mg, 20 mg, Oral, QHS, Jorge Garrett MD, 20 mg at 12/06/22 0107    tamsulosin (FLOMAX) capsule 0.4 mg, 0.4 mg, Oral, DAILY, Jorge Garrett MD, 0.4 mg at 12/06/22 0939    alogliptin (NESINA) tablet 12.5 mg, 12.5 mg, Oral, DAILY, Jorge Garrett MD, 12.5 mg at 12/06/22 0939    metoprolol (LOPRESSOR) injection 5 mg, 5 mg, IntraVENous, Q6H PRN, Jorge Garrett MD    heparin 25,000 units in D5W 250 ml infusion, 12-25 Units/kg/hr, IntraVENous, TITRATE, Jorge Garrett MD, Last Rate: 5.3 mL/hr at 12/06/22 0943, 9 Units/kg/hr at 12/06/22 [facility] Tarun Corley NP    [facility]  Call center: Chandni Barnhart) [phone]  [phone]) [phone]      Francis Lott MD

## 2022-12-06 NOTE — ED NOTES
Bedside and Verbal shift change report given to Jorge Luis Lobo RN (oncoming nurse) by Jeanna Knapp RN (offgoing nurse). Report included the following information SBAR, Kardex, ED Summary, Procedure Summary and Intake/Output.

## 2022-12-06 NOTE — PROGRESS NOTES
TRANSFER - IN REPORT:    Verbal report received from [facility] on [address]  being received from procedure area for routine progression of care. Report consisted of patients Situation, Background, Assessment and Recommendations(SBAR). Information from the following report(s) SBAR, Procedure Summary, MAR, Recent Results, and Cardiac Rhythm NSR  was reviewed with the receiving clinician. Opportunity for questions and clarification was provided. Assessment completed upon patients arrival to [address] [place] [address] and care assumed. Cardiac Cath Lab Recovery Arrival Note:    [address] [place] [address] arrived to [facility] recovery area. Patient procedure= LHC/Stent to Right SFA. Patient on cardiac monitor, non-invasive blood pressure, SPO2 monitor. On Room Air . IV  of NS on pump at 25 ml/hr. Patient status doing well without problems. Patient is A&Ox 4. Patient reports No Pain. PROCEDURE SITE CHECK:    Procedure site:without any bleeding and No Hematoma, No pain/discomfort reported at procedure site. No change in patient status. Continue to monitor patient and status.

## 2022-12-07 LAB
EST. AVERAGE GLUCOSE BLD GHB EST-MCNC: 140 MG/DL
GLUCOSE BLD STRIP.AUTO-MCNC: 172 MG/DL (ref 65–117)
GLUCOSE BLD STRIP.AUTO-MCNC: 211 MG/DL (ref 65–117)
GLUCOSE BLD STRIP.AUTO-MCNC: 256 MG/DL (ref 65–117)
GLUCOSE BLD STRIP.AUTO-MCNC: 375 MG/DL (ref 65–117)
GLUCOSE BLD STRIP.AUTO-MCNC: 377 MG/DL (ref 65–117)
GLUCOSE BLD STRIP.AUTO-MCNC: 411 MG/DL (ref 65–117)
HBA1C MFR BLD: 6.5 % (ref 4–5.6)
SERVICE CMNT-IMP: ABNORMAL
T3FREE SERPL-MCNC: 2.4 PG/ML (ref 2.2–4)
T4 FREE SERPL-MCNC: 1.2 NG/DL (ref 0.8–1.5)
UFH PPP CHRO-ACNC: <0.1 IU/ML

## 2022-12-07 PROCEDURE — 82962 GLUCOSE BLOOD TEST: CPT

## 2022-12-07 PROCEDURE — 36415 COLL VENOUS BLD VENIPUNCTURE: CPT

## 2022-12-07 PROCEDURE — 65660000001 HC RM ICU INTERMED STEPDOWN

## 2022-12-07 PROCEDURE — 74011250637 HC RX REV CODE- 250/637: Performed by: INTERNAL MEDICINE

## 2022-12-07 PROCEDURE — 84439 ASSAY OF FREE THYROXINE: CPT

## 2022-12-07 PROCEDURE — 74011000250 HC RX REV CODE- 250: Performed by: NURSE PRACTITIONER

## 2022-12-07 PROCEDURE — 74011250637 HC RX REV CODE- 250/637: Performed by: NURSE PRACTITIONER

## 2022-12-07 PROCEDURE — 85520 HEPARIN ASSAY: CPT

## 2022-12-07 PROCEDURE — 84481 FREE ASSAY (FT-3): CPT

## 2022-12-07 PROCEDURE — 74011250636 HC RX REV CODE- 250/636: Performed by: INTERNAL MEDICINE

## 2022-12-07 PROCEDURE — 74011636637 HC RX REV CODE- 636/637: Performed by: HOSPITALIST

## 2022-12-07 PROCEDURE — 74011250637 HC RX REV CODE- 250/637: Performed by: SPECIALIST

## 2022-12-07 PROCEDURE — 74011250637 HC RX REV CODE- 250/637: Performed by: HOSPITALIST

## 2022-12-07 PROCEDURE — 74011636637 HC RX REV CODE- 636/637: Performed by: INTERNAL MEDICINE

## 2022-12-07 PROCEDURE — 74011000250 HC RX REV CODE- 250: Performed by: INTERNAL MEDICINE

## 2022-12-07 PROCEDURE — 99233 SBSQ HOSP IP/OBS HIGH 50: CPT | Performed by: SPECIALIST

## 2022-12-07 PROCEDURE — 74011636637 HC RX REV CODE- 636/637: Performed by: NURSE PRACTITIONER

## 2022-12-07 PROCEDURE — 99233 SBSQ HOSP IP/OBS HIGH 50: CPT | Performed by: CLINICAL NURSE SPECIALIST

## 2022-12-07 RX ORDER — INSULIN GLARGINE 100 [IU]/ML
0.2 INJECTION, SOLUTION SUBCUTANEOUS
Status: DISCONTINUED | OUTPATIENT
Start: 2022-12-07 | End: 2022-12-08 | Stop reason: HOSPADM

## 2022-12-07 RX ORDER — FUROSEMIDE 10 MG/ML
60 INJECTION INTRAMUSCULAR; INTRAVENOUS DAILY
Status: DISCONTINUED | OUTPATIENT
Start: 2022-12-08 | End: 2022-12-07

## 2022-12-07 RX ORDER — INSULIN LISPRO 100 [IU]/ML
INJECTION, SOLUTION INTRAVENOUS; SUBCUTANEOUS
Status: DISCONTINUED | OUTPATIENT
Start: 2022-12-07 | End: 2022-12-07

## 2022-12-07 RX ORDER — INSULIN LISPRO 100 [IU]/ML
10 INJECTION, SOLUTION INTRAVENOUS; SUBCUTANEOUS ONCE
Status: COMPLETED | OUTPATIENT
Start: 2022-12-07 | End: 2022-12-07

## 2022-12-07 RX ORDER — CARVEDILOL 12.5 MG/1
12.5 TABLET ORAL 2 TIMES DAILY WITH MEALS
Status: DISCONTINUED | OUTPATIENT
Start: 2022-12-07 | End: 2022-12-08 | Stop reason: HOSPADM

## 2022-12-07 RX ORDER — INSULIN LISPRO 100 [IU]/ML
INJECTION, SOLUTION INTRAVENOUS; SUBCUTANEOUS
Status: DISCONTINUED | OUTPATIENT
Start: 2022-12-07 | End: 2022-12-08 | Stop reason: HOSPADM

## 2022-12-07 RX ORDER — MAGNESIUM SULFATE 100 %
4 CRYSTALS MISCELLANEOUS AS NEEDED
Status: DISCONTINUED | OUTPATIENT
Start: 2022-12-07 | End: 2022-12-08 | Stop reason: HOSPADM

## 2022-12-07 RX ORDER — SPIRONOLACTONE 25 MG/1
25 TABLET ORAL DAILY
Status: DISCONTINUED | OUTPATIENT
Start: 2022-12-07 | End: 2022-12-08 | Stop reason: HOSPADM

## 2022-12-07 RX ADMIN — ALOGLIPTIN 12.5 MG: 12.5 TABLET, FILM COATED ORAL at 08:50

## 2022-12-07 RX ADMIN — SODIUM CHLORIDE, PRESERVATIVE FREE 10 ML: 5 INJECTION INTRAVENOUS at 22:29

## 2022-12-07 RX ADMIN — SODIUM CHLORIDE, PRESERVATIVE FREE 10 ML: 5 INJECTION INTRAVENOUS at 08:49

## 2022-12-07 RX ADMIN — CLOPIDOGREL BISULFATE 75 MG: 75 TABLET ORAL at 08:45

## 2022-12-07 RX ADMIN — Medication 5 UNITS: at 18:27

## 2022-12-07 RX ADMIN — INSULIN GLARGINE 12 UNITS: 100 INJECTION, SOLUTION SUBCUTANEOUS at 22:27

## 2022-12-07 RX ADMIN — ASPIRIN 81 MG: 81 TABLET, COATED ORAL at 08:45

## 2022-12-07 RX ADMIN — SODIUM CHLORIDE, PRESERVATIVE FREE 10 ML: 5 INJECTION INTRAVENOUS at 16:37

## 2022-12-07 RX ADMIN — ROPINIROLE HYDROCHLORIDE 0.25 MG: 0.25 TABLET, FILM COATED ORAL at 16:36

## 2022-12-07 RX ADMIN — Medication 3 MG: at 00:08

## 2022-12-07 RX ADMIN — ISOSORBIDE MONONITRATE 30 MG: 30 TABLET, EXTENDED RELEASE ORAL at 08:45

## 2022-12-07 RX ADMIN — TAMSULOSIN HYDROCHLORIDE 0.4 MG: 0.4 CAPSULE ORAL at 08:45

## 2022-12-07 RX ADMIN — SACUBITRIL AND VALSARTAN 1 TABLET: 24; 26 TABLET, FILM COATED ORAL at 08:45

## 2022-12-07 RX ADMIN — FUROSEMIDE 40 MG: 10 INJECTION, SOLUTION INTRAMUSCULAR; INTRAVENOUS at 08:45

## 2022-12-07 RX ADMIN — ROPINIROLE HYDROCHLORIDE 0.25 MG: 0.25 TABLET, FILM COATED ORAL at 08:45

## 2022-12-07 RX ADMIN — ROPINIROLE HYDROCHLORIDE 0.25 MG: 0.25 TABLET, FILM COATED ORAL at 22:27

## 2022-12-07 RX ADMIN — EMPAGLIFLOZIN 10 MG: 10 TABLET, FILM COATED ORAL at 16:36

## 2022-12-07 RX ADMIN — Medication 2 UNITS: at 08:45

## 2022-12-07 RX ADMIN — Medication 3 MG: at 22:31

## 2022-12-07 RX ADMIN — ROSUVASTATIN 20 MG: 10 TABLET, FILM COATED ORAL at 22:27

## 2022-12-07 RX ADMIN — Medication 10 UNITS: at 13:28

## 2022-12-07 RX ADMIN — ROSUVASTATIN 20 MG: 10 TABLET, FILM COATED ORAL at 00:08

## 2022-12-07 RX ADMIN — METOPROLOL TARTRATE 50 MG: 50 TABLET ORAL at 08:45

## 2022-12-07 RX ADMIN — CARVEDILOL 12.5 MG: 12.5 TABLET, FILM COATED ORAL at 16:36

## 2022-12-07 RX ADMIN — Medication 2 UNITS: at 22:28

## 2022-12-07 NOTE — PROGRESS NOTES
[facility]  Cardiology Care Note                  []Initial visit     [x]Established visit     Patient Name: Allison APIZ:[id number]  Primary Cardiologist: Gaurang Lobo MD  Consulting Cardiologist: Gaurang Lobo MD     Reason for initial visit:     HPI:     CAD with CABG 6/9/2018. NSTEMI in November 2016 and resolved pulmonary HTN. Stent to circumflex an LAD in 2016. Stress echo in 2018 with a drop in BP with exercise and a drop in EF. Cath on 6/22/18 that showed even with a 5F catheter, he dampened with suspected ostial 3 vessel disease. Echo 3/27/2021 = EF 55 to 60% mild AR MR TR LAE MAC  Nuclear 3/18/2021 EF 64% medium size defect apical and inferior lateral reversible ischemia medium defect mid and inferolateral nonreversible appear to be infarction intermediate risk stress test .  PCI 02/28/2022--patent LIMA to LAD, vein graft to distal RCA with severe disease in the native vessels Occluded vein graft to OM 2. Native OM 2 severely diseased along with proximal to mid circumflex as well as distal left main. Overall the vessel is significantly remodeled negatively. Additionally there is significant disease in the first marginal branch which is even smaller as well as AV groove branch right at the takeoff of OM2. Single stent 2.25 x 28 mm Xience was placed extending from the OM 2 into the circumflex into the distal left main and sequentially dilated with 2.25 noncompliant, 2 5 noncompliant, 3 oh noncompliant and 3 5 noncompliant to perform multilevel POT to manage multiple bifurcations on the way. Atherectomy was considered but given small size of the vessels, was not deemed to be absolutely safe for the obtuse marginal lesion. Overall stent expanded fairly well related to the size of the vessels.  Normal LVEDP  Nuclear stress October 27, 2022 test showed ischemia similar to 3/15/2021 with a medium size defect in the mid to distal inferolateral and anterolateral segments in the circumflex territory he had septal dyskinesia with an EF of 39%. He had a fixed apical infarct  Echocardiogram October 27, 2022 showed an EF of 45 to 50% TAPSE at 1.2 showing reduced RV function sclerosis of the aortic valve with stenosis that was very mild with a valve area of mean of 7 mmHg and HUY of 1.4 cm². The mitral was thickened with restricted mobility and mild MR.      SUBJECTIVE:    Catheterization yesterday showed complex anatomy. The grafts to the LAD and RCA are open. The previous stent extending from the left main down to the OM 2 was patent but did demonstrate in-stent restenosis the bifurcation off of the circumflex. Additionally there is development of plaque in the ostial left main. These lesions do not appear suitable or severe enough to require stenting. Dr. Amisha Oseguera and I reviewed the films in detail. The OM1 which was previously disease showed's continued severe disease in its proximal portion and then bifurcated. As compared to the prior catheterization but this distal disease appeared to be more severe and the size of this vessel appears to be too small to successfully angioplasty. The patient is admitted with unstable angina NSTEMI and reduced ejection fraction. He does not have clinical shortness of breath at rest but with noted shortness with with short distances. Today he reports he feels well but on further questioning admits he is not tried to walk. He is somewhat inconsistent historian for his shortness of breath. When seen this morning he was reasonably stable and plans were to maximize his heart failure medicines. Nursing staff indicated that he had a blood pressure of 82 at 1 point but patient was sitting up and eating [employer] during this time and we came to see the patient and asymptomatic.   Patient is mentating well and appears to be perfusing well and now is in improved blood pressure. Blood glucose was 377. Patient was previously on hospital service but I recommended transfer to our service we will need to involve diabetes treatment center. Assessment and Plan     XOL on Crestor  CHF on Entresto Coreg Aldactone  Diabetes on alogliptin and insulin  CAD on beta-blocker DAPT  Stop Lasix to p.o. as not volume overloaded  Stop Imdur  Add Jardiance  1. CAD native vessel with unstable angina and NSTEMI  Prior CABG in 2018, cath 9/8/2021 open LIMA, PCI to OM 2 all the way up to the left main 2/28/2022. Cath yesterday 12/6/2022 showed open LIMA and RCA graft some in-stent restenosis in the long graft from the left main to OM 2 but not severe and the OM1 was more severely diseased in the small vessel. Interventional cardiology felt no reasonable vessel to intervene and recommended medical management. The OM disease is consistent with a lateral wall ischemia demonstrated on prior nuclear stress test.  Heart of the shortness of breath is the anginal equivalent or related to the mild reduction in left ventricular systolic function. Continue aspirin and Plavix noted mild anemia but had a pretty good hemoglobin on admission  Lab Results   Component Value Date/Time    HGB 9.9 (L) 12/06/2022 03:53 AM        2. Cardiomyopathy systolic heart failure  NYHA III with shortness of breath at short distance EF 45 to 50%. Started on Coreg and Entresto patient already on Jardiance if stop Lasix and hydrochlorothiazide hope to would be able to achieve goal-directed optimal medical therapy with Aldactone though blood pressure may be limiting. At this time despite the single low blood pressure he appears to be perfusing well. I discussed with him and his wife all of these findings including the cath this afternoon and have talked to them about medications.   They feel the cost will be acceptable  Lab Results   Component Value Date/Time    NT pro-BNP 2,764 (H) 12/05/2022 04:18 PM Lab Results   Component Value Date/Time    Creatinine 1.70 (H) 12/06/2022 03:53 AM      3. Aortic stenosis mild mean gradient of 7 nn Hg, HUY 1.4 by echo 11/27/2022 -mild , murmur     4. Diabetes mellitus type 2 previously managed by hospitalist service now has a glucose of 377 patient is currently on antihyperglycemic's and will involve diabetes treatment center for assistance  On insulin lispro and alogliptin  5. Hypertension previously hypertensive now with low blood pressure  Will review meds and consider changes based on optimal medical therapy for congestive heart failure    6. CKD 3-4-stable  Lab Results   Component Value Date/Time    Creatinine 1.70 (H) 12/06/2022 03:53 AM     7. XOL  Statin intolerant-improved. Interesting that he had such progression of coronary disease despite being relatively low and weight not sedentary and having to low LDL on a high potency statin. If his LDL was higher I consider adding a PCSK9 but I cannot really recommend that given his LDL 41  At goal , denies excess muscle aches or new liver issues  Key Antihyperlipidemia Meds               rosuvastatin (CRESTOR) 20 mg tablet (Taking) Take 1 Tablet by mouth nightly. Lab Results   Component Value Date/Time    LDL, calculated 41.4 10/12/2022 09:10 AM              ____________________________________________________________    Cardiac testing  10/27/22    ECHO ADULT COMPLETE 10/27/2022 10/27/2022    Interpretation Summary    Left Ventricle: Mildly reduced left ventricular systolic function with a visually estimated EF of 45 - 50%. Left ventricle size is normal. Normal wall thickness. Normal wall motion. Abnormal diastolic function. Right Ventricle: Reduced systolic function. TAPSE is 1.2 cm. Aortic Valve: Valve structure is normal. Mild sclerosis of the aortic valve cusp. Mild annular calcification. Mild regurgitation. Mild stenosis of the aortic valve. AV mean gradient is 7 mmHg. AV peak gradient is 13 mmHg. LVOT:AV VTI Index is 0.55. AV area by peak velocity is 1.4 cm2. Mitral Valve: Moderately thickened leaflet. Mild annular calcification of the mitral valve. The posterior leaflet of the mitral valve has restricted mobility. Mild regurgitation. Left Atrium: Left atrium is dilated. Signed by: Eugenia Ramirez MD on 10/27/2022  4:49 PM        10/27/22    NUCLEAR CARDIAC STRESS TEST 10/27/2022 11/1/2022    Interpretation Summary    Nuclear Findings: The study is positive for myocardial ischemia. The defect appears to be ischemia. The areas of ischemia are similar to 3/15/2021 study. Nuclear Findings: There is a moderate severity left ventricular stress perfusion defect that is medium in size present in the mid to distal inferolateral and anterolateral segment(s) that is reversible. The defect is consistent with abnormal perfusion in the LCx territory. There is normal wall motion in the defect area. The defect appears to be probable ischemia. There is a moderate severity second left ventricular stress perfusion defect. The defect appears to be infarction. Nuclear Findings: Abnormal left ventricular systolic function post-stress. Septal dyskinesis. Post-stress ejection fraction is 39%. ECG: Resting ECG demonstrates normal sinus rhythm. ECG: Stress ECG was negative for ischemia. Stress Test: A pharmacological stress test was performed using lexiscan. Blood pressure demonstrated a normal response and heart rate demonstrated a normal response to stress. The patient's heart rate recovery was normal. The patient reported no symptoms during the stress test.    Signed by: Eugenia Ramirez MD on 10/27/2022  4:45 PM    02/28/22    CARDIAC PROCEDURE 02/28/2022 2/28/2022    Conclusion  Findings  1. Severe native multivessel coronary disease  2. Patent LIMA to LAD, vein graft to distal RCA with severe disease in the native vessels  3. Occluded vein graft to OM 2.   Native OM 2 severely diseased along with proximal to mid circumflex as well as distal left main. Overall the vessel is significantly remodeled negatively. Additionally there is significant disease in the first marginal branch which is even smaller as well as AV groove branch right at the takeoff of OM2. Single stent 2.25 x 28 mm Xience was placed extending from the OM 2 into the circumflex into the distal left main and sequentially dilated with 2.25 noncompliant, 2 5 noncompliant, 3 oh noncompliant and 3 5 noncompliant to perform multilevel POT to manage multiple bifurcations on the way. Atherectomy was considered but given small size of the vessels, was not deemed to be absolutely safe for the obtuse marginal lesion. Overall stent expanded fairly well related to the size of the vessels. 4.  Normal LVEDP    Access right radial: Small vessel, tortuous} brachiocephalic  Contrast 65 cc    Recommendations  1. Plavix based dual antiplatelet therapy  2. Guideline directed medical therapy for secondary prevention of coronary events    Signed by: David Rizvi MD on 2/28/2022  4:22 PM        Most recent HS troponins:  Recent Labs     12/06/22  0353 12/05/22  1618   TROPHS 2,591* 2,720*       Review of Systems    [x]All other systems reviewed and all negative except as written in HPI    [] Patient unable to provide secondary to condition         Past Medical History:   Diagnosis Date    CAD (coronary artery disease) 11/10/2016    NSTEMI & 2 stents    Deafness 10/28/2012    DM (diabetes mellitus) ([facility] [address]     Elevated cholesterol     Hypertension     NSTEMI (non-ST elevated myocardial infarction) ([facility] [address] 11/10/2016     Past Surgical History:   Procedure Laterality Date    COLONOSCOPY N/A 6/28/2018    COLONOSCOPY performed by Meghana Azevedo MD at [address] [place] [address]  11/11/2016    2 stents     Social Hx:  reports that he has never smoked. He has never been exposed to tobacco smoke. He has never used smokeless tobacco. He reports current alcohol use of about 2.0 standard drinks per week. He reports that he does not use drugs. Family Hx: family history includes Elevated Lipids in his brother, brother, and brother; Heart Attack in his father; Heart Disease in his father; Hypertension in his mother; No Known Problems in his daughter, sister, and son. No Known Allergies       OBJECTIVE:  Wt Readings from Last 3 Encounters:   12/05/22 130 lb (59 kg)   12/05/22 130 lb 3.2 oz (59.1 kg)   11/16/22 135 lb (61.2 kg)       Intake/Output Summary (Last 24 hours) at 12/7/2022 1256  Last data filed at 12/7/2022 1105  Gross per 24 hour   Intake 120 ml   Output 350 ml   Net -230 ml         Physical Exam    Vitals:   Vitals:    12/07/22 0845 12/07/22 1000 12/07/22 1058 12/07/22 1200   BP: (!) 116/58 (!) 82/40 119/72 (!) 84/46   Pulse: 84 70 72 69   Resp: 20  21 17   Temp: 97.7 °F (36.5 °C)  98 °F (36.7 °C)    SpO2: 97%  95% 98%   Weight:       Height:         Telemetry: normal sinus rhythm        General:    Alert, cooperative, no distress, appears stated age. Neck:   Supple, no carotid bruit and no JVD. Back:     Symmetric,    Lungs:     clear to auscultation bilaterally. Heart[de-identified]    Regular rate and rhythm, S1, S2 normal, A 1/6 soft systolic murmur is heard throughout the central precordium. click, rub or gallop. Abdomen:     Soft, non-tender. Bowel sounds normal.    Extremities:   Extremities normal, atraumatic, no cyanosis or edema. Vascular:   Pulses - ok   Skin:   Skin color normal. No rashes or lesions on visible areas   Neurologic:   Alert, Moves all extremities. Data Review:     Radiology:   XR Results (most recent):  Results from Hospital Encounter encounter on 12/05/22    XR CHEST PA LAT    Narrative  EXAM:  XR CHEST PA LAT    INDICATION:   shortness of breath    COMPARISON: Chest radiograph 6/13/2018. FINDINGS: PA and lateral radiographs of the chest were obtained.     Trace bilateral pleural effusions with bibasilar atelectasis. Pulmonary vascular  congestion without overt pulmonary edema. No pneumothorax. Stable postsurgical  changes of CABG. Heart size is within normal limits. No acute osseous  abnormality. Cholelithiasis. Impression  1. Trace bilateral pleural effusions with bibasilar atelectasis. 2. Pulmonary vascular congestion without overt pulmonary edema. 3. Cholelithiasis. CT Results (most recent):  Results from Hospital Encounter encounter on 22    CTA CHEST W OR W WO CONT    Narrative  EXAM:  CTA CHEST W OR W WO CONT    INDICATION: Shortness of breath, elevated d-dimer    COMPARISON: None. TECHNIQUE: Helical thin section chest CT following uneventful intravenous  administration of nonionic contrast 100 mL of isovue 370 according to  departmental PE protocol. Coronal and sagittal reformats were performed. 3D post  processing was performed. CT dose reduction was achieved through the use of a  standardized protocol tailored for this examination and automatic exposure  control for dose modulation. FINDINGS: This is a good quality study for the evaluation of pulmonary embolism  to the first subsegmental arterial level. There is no pulmonary embolism to this  level. CHEST WALL: No axillary or supraclavicular lymphadenopathy. THYROID: No nodule. MEDIASTINUM: No mass or lymphadenopathy. Nonenlarged scattered mediastinal nodes  may be reactive. ANDREEA: No mass or lymphadenopathy. THORACIC AORTA: No aneurysm. HEART: Median sternotomy changes. Cardiomegaly. Severe coronary calcifications. Mitral annulus calcifications. ESOPHAGUS: No wall thickening or dilatation. TRACHEA/BRONCHI: Patent. PLEURA: Bilateral small/moderate pleural effusions. No pneumothorax. LUNGS: Mild emphysema. Bilateral septal thickening and increased groundglass  opacification.  Multiple bilateral pulmonary nodules are noted with  representative examples includin mm right lower lobe nodule, 3-38; 10 mm  left lower lobe nodule, 3-45; 9 mm left upper lobe nodule, 3-63. UPPER ABDOMEN: Cholelithiasis. Indeterminate partially imaged 2.0 cm right  adrenal nodule. BONES: No aggressive bone lesion or fracture. Impression  1. No evidence of pulmonary embolism. 2.  Probable mild pulmonary edema with bilateral small/moderate pleural  effusions. 3.  Multiple bilateral pulmonary nodules measuring up to 12 mm, possibly  infectious or inflammatory, though metastatic disease not excluded. Posttreatment imaging recommended to document resolution. MRI Results (most recent):  Results from [place] encounter on 05/22/16    MRI LUMB SPINE WO CONT    Narrative  **Final Report**      ICD Codes / Adm. Diagnosis: 724.2  M54.5 / Lumbago  Low back pain  Examination:  MR Victoria Arndt CON  - [id number] - May 22 2016  1:45PM  Accession No:  [id number]  Reason:  Low back pain      REPORT:  Indication: Pain and back extends into right leg to foot    Exam: MRI of the lumbar spine. Sequences include sagittal and axial T1 and  T2-weighted images. Sagittal STIR. Comparisons: April 27, 2016    Contrast: None    Findings: Alignment is normal. There is no evidence of marrow replacement or  acute fracture. Cord terminus is within normal limits. Paraspinous soft  tissues are within normal limits. T12-L1: No stenosis    L1-L2: There is desiccation of this disc with a small bulge but no stenosis    L2-L3: There is desiccation of this disc with a small bulge but no stenosis    L3-L4: There is mild left facet arthropathy but no stenosis    L4-L5: There is disc height loss with a small disc bulge. Facet arthropathy. No stenosis    L5-S1: There is disc height loss with a small disc bulge and left  paracentral disc extrusion extending inferiorly. This mildly distorts the  left S1 nerve root in the subarticular zone and left lateral recess.  There  are facet degenerative changes with moderate left and mild-to-moderate right  foraminal narrowing    Impression  :  1. Multilevel degenerative change detailed by level above most significant  at L5-S1                Signing/Reading Doctor: Carl Santamaria ([id number])  Approved: Carl Santamaria ([id number])  May 23 2016  8:39AM        No results for input(s): CPK, TROIQ in the last 72 hours. No lab exists for component: CKQMB, CPKMB, BMPP  Recent Labs     12/06/22  0353 12/05/22  1618    136   K 4.2 4.2    103   CO2 23 24   BUN 36* 30*   CREA 1.70* 1.71*   * 161*   PHOS 5.0*  --    CA 9.4 9.6     Recent Labs     12/06/22  0353 12/05/22  2200   WBC 5.9 6.2   HGB 9.9* 10.1*   HCT 32.5* 34.4*    332     Recent Labs     12/06/22  0353 12/05/22  1618   PTP  --  10.8   INR  --  1.0   AP 77 96     No results for input(s): CHOL, LDLC in the last 72 hours.     No lab exists for component: TGL, HDLC,  HBA1C  Recent Labs     12/06/22  0353   TSH 4.80*           Current meds:    Current Facility-Administered Medications:     [START ON 12/8/2022] furosemide (LASIX) injection 60 mg, 60 mg, IntraVENous, DAILY, Monik Ambrose MD    carvediloL (COREG) tablet 12.5 mg, 12.5 mg, Oral, BID WITH MEALS, Jason Gums D, NP    spironolactone (ALDACTONE) tablet 25 mg, 25 mg, Oral, DAILY, Jason Gums D, NP    glucose chewable tablet 16 g, 4 Tablet, Oral, PRN, Jason Gums D, NP    insulin lispro (HUMALOG) injection, , SubCUTAneous, AC&HS, Jason Gums D, NP    isosorbide mononitrate ER (IMDUR) tablet 30 mg, 30 mg, Oral, DAILY, Jason Gums D, NP, 30 mg at 12/07/22 0845    nitroglycerin (NITROSTAT) tablet 0.4 mg, 0.4 mg, SubLINGual, Q5MIN PRN, Jorge Garrett MD    sodium chloride (NS) flush 5-40 mL, 5-40 mL, IntraVENous, Q8H, Jorge Garrett MD, 10 mL at 12/06/22 2234    sodium chloride (NS) flush 5-40 mL, 5-40 mL, IntraVENous, PRN, Jorge Garrett MD    acetaminophen (TYLENOL) tablet 650 mg, 650 mg, Oral, Q6H PRN **OR** acetaminophen (TYLENOL) suppository 650 mg, 650 mg, Rectal, Q6H PRN, Jorge Lozada MD    polyethylene glycol (MIRALAX) packet 17 g, 17 g, Oral, DAILY PRN, Jorge Garrett MD    ondansetron (ZOFRAN ODT) tablet 4 mg, 4 mg, Oral, Q8H PRN **OR** ondansetron (ZOFRAN) injection 4 mg, 4 mg, IntraVENous, Q6H PRN, Jorge Garrett MD    glucagon (GLUCAGEN) injection 1 mg, 1 mg, IntraMUSCular, PRN, Jorge Garrett MD    dextrose 10 % infusion 0-250 mL, 0-250 mL, IntraVENous, PRN, Jorge Garrett MD    rOPINIRole (REQUIP) tablet 0.25 mg, 0.25 mg, Oral, TID, Alfonse Hamman, Mahmud, MD, 0.25 mg at 12/07/22 0845    sodium chloride (NS) flush 5-40 mL, 5-40 mL, IntraVENous, Q8H, Willie Solum EDILBERTO, NP, 10 mL at 12/07/22 0849    sodium chloride (NS) flush 5-40 mL, 5-40 mL, IntraVENous, PRN, Willie Solum EDILBERTO, NP, 10 mL at 12/07/22 0849    sacubitriL-valsartan (ENTRESTO) 24-26 mg tablet 1 Tablet, 1 Tablet, Oral, Q12H, Willie Solbella CASANOVA, NP, 1 Tablet at 12/07/22 0845    melatonin tablet 3 mg, 3 mg, Oral, QHS PRN, Modesta Sky NP, 3 mg at 12/07/22 0008    aspirin delayed-release tablet 81 mg, 81 mg, Oral, DAILY, Jorge Garrett MD, 81 mg at 12/07/22 0845    clopidogreL (PLAVIX) tablet 75 mg, 75 mg, Oral, DAILY, Jorge Garrett MD, 75 mg at 12/07/22 0845    rosuvastatin (CRESTOR) tablet 20 mg, 20 mg, Oral, QHS, Jorge Garrett MD, 20 mg at 12/07/22 0008    tamsulosin (FLOMAX) capsule 0.4 mg, 0.4 mg, Oral, DAILY, Jorge Garrett MD, 0.4 mg at 12/07/22 0845    alogliptin (NESINA) tablet 12.5 mg, 12.5 mg, Oral, DAILY, Jorge Garrett MD, 12.5 mg at 12/07/22 0850    metoprolol (LOPRESSOR) injection 5 mg, 5 mg, IntraVENous, Q6H PRN, MD Shola Bliss MD  [facility] [address] [place] [address] 13, [address] [place] [address],[address]  [facility]  [phone]      Neli Hui MD

## 2022-12-07 NOTE — PROGRESS NOTES
[facility] [facility] Adult  Hospitalist Group                                                                                          Hospitalist Progress Note  Fabio Ramirez MD  Answering service: [phone] from in house phone        Date of Service:  2022  NAME:  Manan Rivero  :  1950  MRN:  [id number]      Admission Summary: This 62-year-old man with past medical history significant for coronary artery disease; status post CABG and stent placement, type 2 diabetes, dyslipidemia, hypertension, chronic kidney disease presented at the emergency room with shortness of breath. This has been going on for about a week. The shortness of breath is worse with mild exertion such as walking and also when the patient is lying down. The patient also complained of chest pain which is located at the center of the chest.  No radiation. Described as sharp pain. No known aggravating or relieving factors, 5/10 in severity. No associated fever, rigors, and chills. When the patient arrived at the emergency room, CTA of the chest was obtained and this was negative for pulmonary embolism. The patient was found to have markedly elevated troponin level. The patient was subsequently referred to the hospitalist service for admission. The patient was last admitted to the hospital from 2018 to 2018. The patient was admitted and underwent CABG for his coronary artery disease. The patient's most recent echocardiogram which was done last month shows ejection fraction of 45-50%. Interval history / Subjective:     Patient is status post cath and stenting of right femoral  Sitting in chair no acute issue discussed with cardiology     Assessment & Plan:     1. Non-ST-elevation myocardial infarction.     -- NSTEMI status post cath--medical management recommended  -- Severe right femoral disease with mid SFA 99% stenosis treated with DCB angioplasty  --Severe native 3 vessel coronary artery disease. Significant diffuse disease in Lcx system with ISR. Moderate left main ostial disease. Patent LIMA to LAD as well as VG to RCA(ostial 50% stenosis)  -- Continue aspirin Plavix  --Medical management as per cardiology GDMT started  --On Coreg Entresto Aldactone    2. Type 2 diabetes. -- A1c 7 sliding-scale insulin resume home medication after discharge patient should be on Jardiance    3. Dyslipidemia. Continue statin  4. Hypertension. We will continue with preadmission medication patient is on Imdur and Coreg Entresto as well  5. Elevated D-dimer. CTA of the chest is negative for PE.   -- Leg cramping probably due to femoral artery stenosis treated with angioplasty    6. Chronic kidney disease stage III. We will continue to monitor the patient's renal function. --Patient is on high-dose of diuretics 60 mg intravenously monitor renal function  7. Acute combined systolic and diastolic heart failure. NYHA II  -- The most recent echocardiogram was done 10/27/2022 and shows ejection fraction of 45-50% --status post cath GDMT recommended on appropriate medication cardiology following    8. Pulmonary nodules. The patient is afebrile and not toxic. Follow-up as an outpatient    9. BPH on Flomax     Code status: Full code  DVT prophylaxis: SCD    Care Plan discussed with: Patient/Family and Nurse  Anticipated Disposition: Home w/Family  Anticipated Discharge: Discussed with cardiology we will sign up patient being transferred to cardiology service       Hospital Problems  Date Reviewed: 12/5/2022            Codes Class Noted POA    * (Principal) NSTEMI (non-ST elevated myocardial infarction) [facility]) ICD-10-CM: I21.4  ICD-9-CM: 410.70  12/5/2022 Yes           Review of Systems:   A comprehensive review of systems was negative. Vital Signs:    Last 24hrs VS reviewed since prior progress note. Most recent are:  Visit Vitals  /72 (BP 1 Location: Right upper arm, BP Patient Position:  At rest;Sitting)   Pulse 72   Temp 98 °F (36.7 °C)   Resp 21   Ht 5' 9\" (1.753 m)   Wt 59 kg (130 lb)   SpO2 95%   BMI 19.20 kg/m²         Intake/Output Summary (Last 24 hours) at 12/7/2022 1157  Last data filed at 12/7/2022 1105  Gross per 24 hour   Intake 120 ml   Output 350 ml   Net -230 ml          Physical Examination:     I had a face to face encounter with this patient and independently examined them on 12/7/2022 as outlined below:          General : alert x 3, awake, no acute distress, resting in bed, pleasant   HEENT: PEERL, EOMI, moist mucus membrane, TM clear  Neck: supple, no JVD, no meningeal signs  Chest: Clear to auscultation bilaterally   CVS: S1 S2 heard, Capillary refill less than 2 seconds  Abd: soft/ Non tender, non distended, BS physiological,   Ext: no clubbing, no cyanosis, no edema, brisk 2+ DP pulses  Neuro/Psych: pleasant mood and affect, CN 2-12 grossly intact, sensory grossly within normal limit, Strength 5/5 in all extremities, DTR 1+ x 4  Skin: warm     Data Review:    I personally reviewed  Image and labs      Labs:     Recent Labs     12/06/22 0353 12/05/22 2200   WBC 5.9 6.2   HGB 9.9* 10.1*   HCT 32.5* 34.4*    332       Recent Labs     12/06/22  0353 12/05/22  1618    136   K 4.2 4.2    103   CO2 23 24   BUN 36* 30*   CREA 1.70* 1.71*   * 161*   CA 9.4 9.6   MG 2.4  --    PHOS 5.0*  --        Recent Labs     12/06/22  0353 12/05/22  1618   ALT 21 23   AP 77 96   TBILI 0.3 0.4   TP 7.7 9.1*   ALB 3.4* 3.8   GLOB 4.3* 5.3*       Recent Labs     12/05/22  2200 12/05/22  1618   INR  --  1.0   PTP  --  10.8   APTT 25.1  --         No results for input(s): FE, TIBC, PSAT, FERR in the last 72 hours. Lab Results   Component Value Date/Time    Folate 10.5 07/03/2018 09:24 AM        No results for input(s): PH, PCO2, PO2 in the last 72 hours. No results for input(s): CPK, CKNDX, TROIQ in the last 72 hours.     No lab exists for component: CPKMB  Lab Results Component Value Date/Time    Cholesterol, total 143 10/12/2022 09:10 AM    HDL Cholesterol 49 10/12/2022 09:10 AM    LDL, calculated 41.4 10/12/2022 09:10 AM    Triglyceride 263 (H) 10/12/2022 09:10 AM    CHOL/HDL Ratio 2.9 10/12/2022 09:10 AM     Lab Results   Component Value Date/Time    Glucose (POC) 172 (H) 12/07/2022 08:15 AM    Glucose (POC) 207 (H) 12/06/2022 10:25 PM    Glucose (POC) 104 12/06/2022 05:48 PM    Glucose (POC) 114 12/06/2022 03:44 PM    Glucose (POC) 185 (H) 12/06/2022 11:57 AM     Lab Results   Component Value Date/Time    Color YELLOW/STRAW 01/20/2021 08:56 AM    Appearance CLEAR 01/20/2021 08:56 AM    Specific gravity 1.015 01/20/2021 08:56 AM    Specific gravity 1.020 05/03/2014 08:18 AM    pH (UA) 5.5 01/20/2021 08:56 AM    Protein 300 (A) 01/20/2021 08:56 AM    Glucose Negative 01/20/2021 08:56 AM    Ketone Negative 01/20/2021 08:56 AM    Bilirubin Negative 01/20/2021 08:56 AM    Urobilinogen 0.2 01/20/2021 08:56 AM    Nitrites Negative 01/20/2021 08:56 AM    Leukocyte Esterase Negative 01/20/2021 08:56 AM    Epithelial cells FEW 01/20/2021 08:56 AM    Bacteria Negative 01/20/2021 08:56 AM    WBC 0-4 01/20/2021 08:56 AM    RBC 0-5 01/20/2021 08:56 AM         Medications Reviewed:     Current Facility-Administered Medications   Medication Dose Route Frequency    [START ON 12/8/2022] furosemide (LASIX) injection 60 mg  60 mg IntraVENous DAILY    carvediloL (COREG) tablet 12.5 mg  12.5 mg Oral BID WITH MEALS    spironolactone (ALDACTONE) tablet 25 mg  25 mg Oral DAILY    isosorbide mononitrate ER (IMDUR) tablet 30 mg  30 mg Oral DAILY    nitroglycerin (NITROSTAT) tablet 0.4 mg  0.4 mg SubLINGual Q5MIN PRN    sodium chloride (NS) flush 5-40 mL  5-40 mL IntraVENous Q8H    sodium chloride (NS) flush 5-40 mL  5-40 mL IntraVENous PRN    acetaminophen (TYLENOL) tablet 650 mg  650 mg Oral Q6H PRN    Or    acetaminophen (TYLENOL) suppository 650 mg  650 mg Rectal Q6H PRN    polyethylene glycol (MIRALAX) packet 17 g  17 g Oral DAILY PRN    ondansetron (ZOFRAN ODT) tablet 4 mg  4 mg Oral Q8H PRN    Or    ondansetron (ZOFRAN) injection 4 mg  4 mg IntraVENous Q6H PRN    insulin lispro (HUMALOG) injection   SubCUTAneous AC&HS    glucose chewable tablet 16 g  4 Tablet Oral PRN    glucagon (GLUCAGEN) injection 1 mg  1 mg IntraMUSCular PRN    dextrose 10 % infusion 0-250 mL  0-250 mL IntraVENous PRN    rOPINIRole (REQUIP) tablet 0.25 mg  0.25 mg Oral TID    sodium chloride (NS) flush 5-40 mL  5-40 mL IntraVENous Q8H    sodium chloride (NS) flush 5-40 mL  5-40 mL IntraVENous PRN    sacubitriL-valsartan (ENTRESTO) 24-26 mg tablet 1 Tablet  1 Tablet Oral Q12H    melatonin tablet 3 mg  3 mg Oral QHS PRN    aspirin delayed-release tablet 81 mg  81 mg Oral DAILY    clopidogreL (PLAVIX) tablet 75 mg  75 mg Oral DAILY    rosuvastatin (CRESTOR) tablet 20 mg  20 mg Oral QHS    tamsulosin (FLOMAX) capsule 0.4 mg  0.4 mg Oral DAILY    alogliptin (NESINA) tablet 12.5 mg  12.5 mg Oral DAILY    metoprolol (LOPRESSOR) injection 5 mg  5 mg IntraVENous Q6H PRN     ______________________________________________________________________  EXPECTED LENGTH OF STAY: 2d 12h  ACTUAL LENGTH OF STAY:          2      Please note that this dictation was completed with [employer], the computer voice recognition software. Quite often unanticipated grammatical, syntax, homophones, and other interpretive errors are inadvertently transcribed by the computer software. Please disregard these errors. Please excuse any errors that have escaped final proofreading.                 Damaso Laguna MD

## 2022-12-07 NOTE — PROGRESS NOTES
Transition of Care Plan  RUR 12%     Disposition  Home with wife     Transportation  wife/family    Had a cardiac cath on 11/6/22     Home Health   Serviced by [facility] in 2018  Would use agency again if needed     Medical follow  PCP /specialist     Contact  Wife  Bridgett Terry [phone] mobile  Daughter Aparna Boudreaux [phone]    CM will continue to follow for any discharge needs that may arise    YJustino Bar MSA, RN, CM Statement Selected

## 2022-12-07 NOTE — CONSULTS
Pulmonary, Critical Care, and Sleep Medicine~Consult Note    Name: Demond Cazares MRN: [id number]   : 1950 [facility]   Date: 2022 11:54 AM Admission: 2022     Impression Plan   Pulmonary nodules; multiple pulmonary nodules, largest 1.2cm. most nodules showed calcifications with suggests they have been there for a while. No prior film for comparison. Suspect granuloma from prior resp infection, but will need to monitor    NSTEMI  S/p CABG, stent placement   Decom HFrEF 45-50%   Nonsmoker  CKD III Lasix as per cards  Outpatient PET scan in 1-2 wks   O2 titration above 90%, currently on room air  Care can be done on outpatient basis   Discussed with attending      Daily Progression:    Consult Note requested by hospitalist service. Patient presented for admission secondary to dyspnea; found to have an MI. S/p cath on ; SFA 99% stenosis with angioplasty. Dyspnea better. CT imaging revealed below:     22 CT scan  IMPRESSION  1. No evidence of pulmonary embolism. 2.  Probable mild pulmonary edema with bilateral small/moderate pleural effusions. 3.  Multiple bilateral pulmonary nodules measuring up to 12 mm, possibly infectious or inflammatory, though metastatic disease not excluded. Posttreatment imaging recommended to document resolution. I have reviewed the labs and previous days notes. A comprehensive review of systems was negative.   Past Medical History:   Diagnosis Date    CAD (coronary artery disease) 11/10/2016    NSTEMI & 2 stents    Deafness 10/28/2012    DM (diabetes mellitus) ([facility] [address]     Elevated cholesterol     Hypertension     NSTEMI (non-ST elevated myocardial infarction) ([facility] [address] 11/10/2016      Past Surgical History:   Procedure Laterality Date    COLONOSCOPY N/A 2018    COLONOSCOPY performed by Dorina Matthew MD at [address]  2016    2 stents Prior to Admission medications    Medication Sig Start Date End Date Taking? Authorizing Provider   furosemide (LASIX) 20 mg tablet Take 1 Tablet by mouth daily. 12/2/22  Yes Asia Bacon MD   glipiZIDE (GLUCOTROL) 5 mg tablet Take 1 tablet by mouth twice daily 12/2/22  Yes Asia Bacon MD   isosorbide mononitrate ER (IMDUR) 30 mg tablet Take 1 tablet by mouth once daily 12/2/22  Yes Asia Bacon MD   metFORMIN (GLUCOPHAGE) 1,000 mg tablet TAKE 1 TABLET BY MOUTH TWICE DAILY WITH MEALS 12/2/22  Yes Asia Bacon MD   hydroCHLOROthiazide (HYDRODIURIL) 25 mg tablet Take 1 Tablet by mouth daily. 12/2/22  Yes Asia Bacon MD   tamsulosin [facility]) 0.4 mg capsule Take 1 capsule by mouth once daily 11/22/22  Yes Asia Bacon MD   ipratropium (ATROVENT) 21 mcg (0.03 %) nasal spray 2 Sprays by Both Nostrils route every twelve (12) hours. 11/21/22  Yes Asia Bacon MD   nitroglycerin (NITROSTAT) 0.4 mg SL tablet 1 Tablet by SubLINGual route every five (5) minutes as needed for Chest Pain. Up to 3 doses. 11/16/22  Yes Kevin Tracy MD   Jardiance 25 mg tablet TAKE 1 TABLET BY MOUTH IN THE MORNING 10/31/22  Yes Asia Bacon MD   ergocalciferol (ERGOCALCIFEROL) 1,250 mcg (50,000 unit) capsule Take 1 Capsule by mouth every seven (7) days. 10/14/22  Yes Asia Bacon MD   Januvia 50 mg tablet Take 1 tablet by mouth once daily 9/23/22  Yes Asia Bacon MD   polyethylene glycol (MIRALAX) 17 gram/dose powder Take 17 g by mouth daily. 5/31/22  Yes Asia Bacon MD   clopidogreL (Plavix) 75 mg tab Take 1 Tablet by mouth daily for 360 days. Indications: a sudden worsening of angina called acute coronary syndrome 2/28/22 2/23/23 Yes Dorene Garcia MD   rosuvastatin (CRESTOR) 20 mg tablet Take 1 Tablet by mouth nightly. 2/28/22  Yes Dorene Garcia MD   aspirin delayed-release 81 mg tablet Take 1 Tab by mouth.    Yes Provider, Historical   albendazole (ALBENZA) 200 mg tablet TAKE 2 TABLETS BY MOUTH ONCE FOR 1 DOSE. THEN REPEAT IN ONE WEEK  Patient not taking: Reported on 2022   Primitivo Nguyễn MD     No Known Allergies   Social History     Tobacco Use    Smoking status: Never     Passive exposure: Never    Smokeless tobacco: Never   Substance Use Topics    Alcohol use:  Yes     Alcohol/week: 2.0 standard drinks     Types: 1 Cans of beer, 1 Shots of liquor per week     Comment: 1 DRINK DAILY      Family History   Problem Relation Age of Onset    Heart Disease Father     Heart Attack Father     Hypertension Mother     Elevated Lipids Brother     Elevated Lipids Brother     No Known Problems Sister     Elevated Lipids Brother     No Known Problems Son     No Known Problems Daughter     Anesth Problems Neg Hx      OBJECTIVE:     Vital Signs:     Visit Vitals  /72 (BP 1 Location: Right upper arm, BP Patient Position: At rest;Sitting)   Pulse 72   Temp 98 °F (36.7 °C)   Resp 21   Ht 5' 9\" (1.753 m)   Wt 59 kg (130 lb)   SpO2 95%   BMI 19.20 kg/m²      Temp (24hrs), Av °F (36.7 °C), Min:97.7 °F (36.5 °C), Max:98.4 °F (36.9 °C)     Intake/Output:     Last shift: 701 - 1900  In: -   Out: 350 [Urine:350]    Last 3 shifts: 1901 - 700  In: 120 [P.O.:120]  Out: 1700 [Urine:1700]        Intake/Output Summary (Last 24 hours) at 2022 1154  Last data filed at 2022 1105  Gross per 24 hour   Intake 120 ml   Output 350 ml   Net -230 ml       Physical Exam:                                        Exam Findings Other   General: No resp distress noted, appears stated age    HEENT:  No ulcers, JVD not elevated, no cervical LAD    Chest: No pectus deformity, normal chest rise b/l    HEART:  RRR, no murmurs/rubs/gallops    Lungs:  CTA b/l, no rhonchi/crackles/wheeze, diminished BS at bases    ABD: Soft/NT, non rigid mildly distended    EXT: No cyanosis/clubbing/edema, normal peripheral pulses    Skin: No rashes or ulcers, no mottling    Neuro: A/O x 3 Medications:  Current Facility-Administered Medications   Medication Dose Route Frequency    [START ON 12/8/2022] furosemide (LASIX) injection 60 mg  60 mg IntraVENous DAILY    carvediloL (COREG) tablet 12.5 mg  12.5 mg Oral BID WITH MEALS    spironolactone (ALDACTONE) tablet 25 mg  25 mg Oral DAILY    isosorbide mononitrate ER (IMDUR) tablet 30 mg  30 mg Oral DAILY    nitroglycerin (NITROSTAT) tablet 0.4 mg  0.4 mg SubLINGual Q5MIN PRN    sodium chloride (NS) flush 5-40 mL  5-40 mL IntraVENous Q8H    sodium chloride (NS) flush 5-40 mL  5-40 mL IntraVENous PRN    acetaminophen (TYLENOL) tablet 650 mg  650 mg Oral Q6H PRN    Or    acetaminophen (TYLENOL) suppository 650 mg  650 mg Rectal Q6H PRN    polyethylene glycol (MIRALAX) packet 17 g  17 g Oral DAILY PRN    ondansetron (ZOFRAN ODT) tablet 4 mg  4 mg Oral Q8H PRN    Or    ondansetron (ZOFRAN) injection 4 mg  4 mg IntraVENous Q6H PRN    insulin lispro (HUMALOG) injection   SubCUTAneous AC&HS    glucose chewable tablet 16 g  4 Tablet Oral PRN    glucagon (GLUCAGEN) injection 1 mg  1 mg IntraMUSCular PRN    dextrose 10 % infusion 0-250 mL  0-250 mL IntraVENous PRN    rOPINIRole (REQUIP) tablet 0.25 mg  0.25 mg Oral TID    sodium chloride (NS) flush 5-40 mL  5-40 mL IntraVENous Q8H    sodium chloride (NS) flush 5-40 mL  5-40 mL IntraVENous PRN    sacubitriL-valsartan (ENTRESTO) 24-26 mg tablet 1 Tablet  1 Tablet Oral Q12H    melatonin tablet 3 mg  3 mg Oral QHS PRN    aspirin delayed-release tablet 81 mg  81 mg Oral DAILY    clopidogreL (PLAVIX) tablet 75 mg  75 mg Oral DAILY    rosuvastatin (CRESTOR) tablet 20 mg  20 mg Oral QHS    tamsulosin (FLOMAX) capsule 0.4 mg  0.4 mg Oral DAILY    alogliptin (NESINA) tablet 12.5 mg  12.5 mg Oral DAILY    metoprolol (LOPRESSOR) injection 5 mg  5 mg IntraVENous Q6H PRN       Labs:  ABG No results for input(s): PHI, PCO2I, PO2I, HCO3I, SO2I, FIO2I in the last 72 hours.      CBC Recent Labs     12/06/22  0353 12/05/22  2200 12/05/22  1618   WBC 5.9 6.2 6.6   HGB 9.9* 10.1* 11.6*   HCT 32.5* 34.4* 38.5    332 328   MCV 77.8* 80.2 78.9*   MCH 23.7* 23.5* 08.4*        Metabolic  Panel Recent Labs     12/06/22  0353 12/05/22  1618    136   K 4.2 4.2    103   CO2 23 24   * 161*   BUN 36* 30*   CREA 1.70* 1.71*   CA 9.4 9.6   MG 2.4  --    PHOS 5.0*  --    ALB 3.4* 3.8   ALT 21 23   INR  --  1.0        Pertinent Labs                Manjeet Bustos PA-C  12/7/2022

## 2022-12-07 NOTE — TELEPHONE ENCOUNTER
Spoke to patient's wife. Name noted on permission to release information. Identifiers x 2. She would like update regarding plan of care and possible hospital discharge.  To discuss with MD.

## 2022-12-07 NOTE — PROGRESS NOTES
Bedside and Verbal shift change report given to Raysa Perez RN (oncoming nurse) by Lauryn Driver (offgoing nurse). Report included the following information SBAR and Kardex.

## 2022-12-07 NOTE — PROGRESS NOTES
Pt with complaints of inability to void after several attempts and positioning. Pt states he would like a catheter. Pt's abdomen is distended.

## 2022-12-07 NOTE — DIABETES MGMT
[facility]  DIABETES MANAGEMENT CONSULT    Consulted by Provider for advanced nursing evaluation and care for inpatient blood glucose management. Evaluation and Action Plan   Jomar Abbott is a 78yo male admitted for NSTEM, s/p cardiac cath no PCI intervention- T2D on oral diabetes medications PTA (Januvia/Jardiance/ Metformin/ and Glipizide)-A1C as of Oct 2022 7.0%, Current A1C pending. BG trneds noted to be elevated today despite correction and aloglipitin therapies. To get BG into tight control while hospitlaized recommend starting daily basal/correction insulin regiment to get BG to 140-180 while hosptialized. Noted Jardiance also ordered, 10mg- appropriate. DISCONTINUE Alogliptin while hospitalized as this medication not improving glucose. Management Rationale Action Plan   Medication   Basal needs Not ordered START 0.2u/kg basal insulin- Lantus 10 units daily   Nutritional needs Not ordered If pre meal BG >180, start Lispro 3 units TID    Corrective insulin Using normal  sensitivity based on BMI    Overriding steroid effect NA    Referral []        Outpatient diabetes education  [] Behavioral health  [] Inpatient nutrition services  [] Pharmacy services            Initial Presentation   Jomar Abbott is a 71 y.o. male admitted 12/5/22  after experiencing Sob. This has been going on for about a week. The shortness of breath is worse with mild exertion such as walking and also when the patient is lying down. The patient also complained of chest pain which is located at the center of the chest.  When the patient arrived at the emergency room, CTA of the chest was obtained and this was negative for pulmonary embolism. S/p CABG 2018. PCI 2/2022. The patient's most recent echocardiogram which was done last month shows ejection fraction of 45-50%.   LAB: troponin 2720 at admission      HX:   Past Medical History:   Diagnosis Date    CAD (coronary artery disease) 11/10/2016 NSTEMI & 2 stents    Deafness 10/28/2012    DM (diabetes mellitus) ([facility]     Elevated cholesterol     Hypertension     [facility] 11/10/2016        INITIAL DX:   NSTEMI (non-ST elevated myocardial infarction) ([facility] [I21.4]     Current Treatment     TX: heparin infusion    Hospital Course   Clinical progress has been complicated by NSTEMI with subsequent cardiac cath. .     Diabetes History   T2D, A1C as of Oct. 2022-7.0-     Diabetes-related Medical History  Acute complications  Persistent hyperglycemia  Neurological complications  NONE  Microvascular disease  Nephropathy-CKD  Macrovascular disease  CAD and Myocardial infarction- CABG and stents PTA  Other associated conditions     HTN    Diabetes Medication History  Key Antihyperglycemic Medications               glipiZIDE (GLUCOTROL) 5 mg tablet (Taking) Take 1 tablet by mouth twice daily    metFORMIN (GLUCOPHAGE) 1,000 mg tablet (Taking) TAKE 1 TABLET BY MOUTH TWICE DAILY WITH MEALS    Jardiance 25 mg tablet (Taking) TAKE 1 TABLET BY MOUTH IN THE MORNING    Januvia 50 mg tablet (Taking) Take 1 tablet by mouth once daily             Diabetes self-management practices:   Eating pattern-mostly veggies/consumes 'diabetes rice\"    [x] Eating a carbohydrate-controlled meal plan    Physical activity pattern-none -orthopedic issues     Monitoring pattern-was not monitoring BG      Taking medications pattern  [x] Consistent administration  [x] Affordable  Social determinants of health impacting diabetes self-management practices   Concerned that you need to know more about how to stay healthy with diabetes  Overall evaluation:    [x] Achieving A1c target with drug therapy & self-care practices    Subjective   I have not been checking my BG .      Objective   Physical exam  General Normal weight male in no acute distress.  Conversant and cooperative  Neuro  Alert, oriented   Vital Signs Visit Vitals  BP (!) 83/40   Pulse 70 Temp 98 °F (36.7 °C)   Resp 17   Ht 5' 9\" (1.753 m)   Wt 59 kg (130 lb)   SpO2 98%   BMI 19.20 kg/m²     Skin  Warm and dry. A  Heart   Regular rate and rhythm.  No murmurs, rubs or gallops  Lungs  Clear to auscultation without rales or rhonchi  Extremities No foot wounds         Laboratory  Recent Labs     12/06/22  0353 12/05/22  2200 12/05/22  1618   *  --  161*   AGAP 9  --  9   WBC 5.9 6.2 6.6   CREA 1.70*  --  1.71*   AST 16  --  18   ALT 21  --  23       Factors impacting BG management  Factor Dose Comments   Nutrition:  Standard meals     60 grams/meal      Other:   Kidney function  Liver function     Cr+1.70/eGFR 44      Blood glucose pattern    Significant diabetes-related events over the past 24-72 hours  T2D, A1C 7.0%  NSTEMI  BG trends since admission risen >200 despite oral Alogliptin and correctinal insulin  Lantus ordered, 12 units daily-12/7/22    Assessment and Nursing Intervention   Nursing Diagnosis Risk for unstable blood glucose pattern   Nursing Intervention Domain 5250 Decision-making Support   Nursing Interventions Examined current inpatient diabetes/blood glucose control   Explored factors facilitating and impeding inpatient management  Explored corrective strategies with patient and responsible inpatient provider   Informed patient of rational for insulin strategy while hospitalized     Nursing Diagnosis 90929 Ineffective Health Management   Nursing Intervention Domain 5250 Decision-making Support   Nursing Interventions Identified diabetes self-management practices impeding diabetes control  Discussed diabetes survival skills related to  Healthy Plate eating plan; given handouts  Role of physical activity in improving insulin sensitivity and action  Procedure for blood glucose monitoring & options for low-cost products  Medications plan at discharge     Billing Code(s)   64231     Before making these care recommendations, I personally reviewed the hospitalization record, including notes, laboratory & diagnostic data and current medications, and examined the patient at the bedside (circumstances permitting) before making care recommendations. More than fifty (50) percent of the time was spent in patient counseling and/or care coordination.   Total minutes: [address] SLICK M[facility]  Diabetes Clinical Nurse Specialist  [facility]  Access via [address] [place] [address]

## 2022-12-07 NOTE — PROGRESS NOTES
11:55:  BP 82/42 was obtained and BG of 377 was obtained, Antony Pope RN I held both Coreg and Spironolactone due to patients vitals and contacted the Mehdi Maharaj MD and team following the patient via perfect serve. No response back.

## 2022-12-07 NOTE — PROGRESS NOTES
Bedside and Verbal shift change report given to Balaji (oncoming nurse) by Jam Steen (offgoing nurse). Report included the following information SBAR, Kardex, Intake/Output, MAR, and Recent Results.

## 2022-12-07 NOTE — TELEPHONE ENCOUNTER
Patient's wife is calling because she would like to speak with the doctor to find out why her  has to in the hospital longer. Please advise.     [phone] cell

## 2022-12-07 NOTE — PROGRESS NOTES
Pt catheterized with 14fr straight cath kit using sterile technique per hospital policy. Pt tolerated well and drained 400 cc of clear yellow urine. Pt states feeling better.

## 2022-12-07 NOTE — PROGRESS NOTES
Spoke with patient and NP Brandi Bardales at bedside where she stated, she is still awaiting to here back from Dr. Vira Theodore on how to treat BP in the 80s/40s. She stated give patient 10 units of Lispro for BG of 377. At 1328, 10 units of Lispro was given to patient.

## 2022-12-07 NOTE — PROGRESS NOTES
TRANSFER - OUT REPORT:    Verbal report given to [address]  on [address] being transferred to Room 409 for routine progression of care       Report consisted of patients Situation, Background, Assessment and   Recommendations(SBAR). Information from the following report(s) SBAR, Procedure Summary, MAR, Recent Results, and Cardiac Rhythm NSR  was reviewed with the receiving nurse. Opportunity for questions and clarification was provided.

## 2022-12-07 NOTE — PROGRESS NOTES
Patient's b/p 80/40s, patient sitting in chair, no s/s of distress. Spirolactone and carvedilol held until further instructions from provider. 1201-Attempt made to contact cardiology group [facility] via perfectserve. 1210- Patients glucose 375. Results relayed to [facility] via perfectserve. 1215-Attempt made to call provider via phone. [facility] returns call. Concerns of b/p (and meds held) and glucose relayed to NP. NP stated she will place new orders. 1250-New orders noted, parameters added for cardiac medications. New orders noted for sliding scale, however new orders do not cover current blood glucose numbers. 1255-Attempt made to contact group again for coverage, advisement regarding blood pressure and glucose via [employer] .  1301-Call to [facility].  1302-Perfectserve to Dr. Thien Mott to address concerns. Hospitalist advised he is no longer following patient. 1315-NP in room, to see patient. Concerns raised again regarding B/P, currently 90/50s, and glucose numbers. NP stated she had reached out to Dr. Jen Jang regarding patients B/P. New orders noted to treat blood glucose. 1- Dr. Jen Jang at nursing station to discuss patient with this nurse. Provider advised that patients with heart failure will oftentimes run lower blood pressures. Patient's current condition discussed with this nurse and orienting nurse, Manning Gitelman, RN. No new orders given to this nurse at this time. Provider into see patient.

## 2022-12-08 ENCOUNTER — APPOINTMENT (OUTPATIENT)
Dept: NON INVASIVE DIAGNOSTICS | Age: 72
DRG: 280 | End: 2022-12-08
Attending: SPECIALIST
Payer: MEDICARE

## 2022-12-08 ENCOUNTER — TELEPHONE (OUTPATIENT)
Dept: FAMILY MEDICINE CLINIC | Age: 72
End: 2022-12-08

## 2022-12-08 VITALS
DIASTOLIC BLOOD PRESSURE: 43 MMHG | OXYGEN SATURATION: 97 % | WEIGHT: 130 LBS | RESPIRATION RATE: 24 BRPM | HEIGHT: 69 IN | HEART RATE: 78 BPM | SYSTOLIC BLOOD PRESSURE: 100 MMHG | BODY MASS INDEX: 19.26 KG/M2 | TEMPERATURE: 97.6 F

## 2022-12-08 LAB
ANION GAP SERPL CALC-SCNC: 8 MMOL/L (ref 5–15)
BNP SERPL-MCNC: 1581 PG/ML
BUN SERPL-MCNC: 36 MG/DL (ref 6–20)
BUN/CREAT SERPL: 22 (ref 12–20)
CALCIUM SERPL-MCNC: 8.6 MG/DL (ref 8.5–10.1)
CHLORIDE SERPL-SCNC: 105 MMOL/L (ref 97–108)
CO2 SERPL-SCNC: 23 MMOL/L (ref 21–32)
CREAT SERPL-MCNC: 1.66 MG/DL (ref 0.7–1.3)
ERYTHROCYTE [DISTWIDTH] IN BLOOD BY AUTOMATED COUNT: 16.9 % (ref 11.5–14.5)
GLUCOSE BLD STRIP.AUTO-MCNC: 142 MG/DL (ref 65–117)
GLUCOSE BLD STRIP.AUTO-MCNC: 283 MG/DL (ref 65–117)
GLUCOSE SERPL-MCNC: 134 MG/DL (ref 65–100)
HCT VFR BLD AUTO: 30 % (ref 36.6–50.3)
HGB BLD-MCNC: 8.9 G/DL (ref 12.1–17)
MCH RBC QN AUTO: 23.4 PG (ref 26–34)
MCHC RBC AUTO-ENTMCNC: 29.7 G/DL (ref 30–36.5)
MCV RBC AUTO: 78.7 FL (ref 80–99)
NRBC # BLD: 0 K/UL (ref 0–0.01)
NRBC BLD-RTO: 0 PER 100 WBC
PLATELET # BLD AUTO: 259 K/UL (ref 150–400)
PMV BLD AUTO: 11.1 FL (ref 8.9–12.9)
POTASSIUM SERPL-SCNC: 3.8 MMOL/L (ref 3.5–5.1)
RBC # BLD AUTO: 3.81 M/UL (ref 4.1–5.7)
SERVICE CMNT-IMP: ABNORMAL
SERVICE CMNT-IMP: ABNORMAL
SODIUM SERPL-SCNC: 136 MMOL/L (ref 136–145)
WBC # BLD AUTO: 5.9 K/UL (ref 4.1–11.1)

## 2022-12-08 PROCEDURE — 74011000250 HC RX REV CODE- 250: Performed by: SPECIALIST

## 2022-12-08 PROCEDURE — 74011636637 HC RX REV CODE- 636/637: Performed by: HOSPITALIST

## 2022-12-08 PROCEDURE — C8923 2D TTE W OR W/O FOL W/CON,CO: HCPCS

## 2022-12-08 PROCEDURE — 74011250637 HC RX REV CODE- 250/637: Performed by: NURSE PRACTITIONER

## 2022-12-08 PROCEDURE — 74011250637 HC RX REV CODE- 250/637: Performed by: INTERNAL MEDICINE

## 2022-12-08 PROCEDURE — 74011000250 HC RX REV CODE- 250: Performed by: NURSE PRACTITIONER

## 2022-12-08 PROCEDURE — 74011000250 HC RX REV CODE- 250: Performed by: INTERNAL MEDICINE

## 2022-12-08 PROCEDURE — 36415 COLL VENOUS BLD VENIPUNCTURE: CPT

## 2022-12-08 PROCEDURE — 82962 GLUCOSE BLOOD TEST: CPT

## 2022-12-08 PROCEDURE — 74011250636 HC RX REV CODE- 250/636: Performed by: SPECIALIST

## 2022-12-08 PROCEDURE — 74011250637 HC RX REV CODE- 250/637: Performed by: HOSPITALIST

## 2022-12-08 PROCEDURE — 74011250637 HC RX REV CODE- 250/637: Performed by: SPECIALIST

## 2022-12-08 PROCEDURE — 80048 BASIC METABOLIC PNL TOTAL CA: CPT

## 2022-12-08 PROCEDURE — 85027 COMPLETE CBC AUTOMATED: CPT

## 2022-12-08 PROCEDURE — 83880 ASSAY OF NATRIURETIC PEPTIDE: CPT

## 2022-12-08 PROCEDURE — 99239 HOSP IP/OBS DSCHRG MGMT >30: CPT | Performed by: SPECIALIST

## 2022-12-08 RX ORDER — INSULIN LISPRO 100 [IU]/ML
3 INJECTION, SOLUTION INTRAVENOUS; SUBCUTANEOUS
Status: DISCONTINUED | OUTPATIENT
Start: 2022-12-08 | End: 2022-12-08 | Stop reason: HOSPADM

## 2022-12-08 RX ORDER — SACUBITRIL AND VALSARTAN 24; 26 MG/1; MG/1
1 TABLET, FILM COATED ORAL 2 TIMES DAILY
Qty: 180 TABLET | Refills: 1 | Status: SHIPPED | OUTPATIENT
Start: 2022-12-08 | End: 2022-12-16

## 2022-12-08 RX ORDER — CARVEDILOL 12.5 MG/1
12.5 TABLET ORAL 2 TIMES DAILY WITH MEALS
Status: CANCELLED | OUTPATIENT
Start: 2022-12-08

## 2022-12-08 RX ORDER — SPIRONOLACTONE 25 MG/1
25 TABLET ORAL DAILY
Status: CANCELLED | OUTPATIENT
Start: 2022-12-08

## 2022-12-08 RX ADMIN — ASPIRIN 81 MG: 81 TABLET, COATED ORAL at 09:04

## 2022-12-08 RX ADMIN — ROPINIROLE HYDROCHLORIDE 0.25 MG: 0.25 TABLET, FILM COATED ORAL at 09:04

## 2022-12-08 RX ADMIN — SODIUM CHLORIDE, PRESERVATIVE FREE 10 ML: 5 INJECTION INTRAVENOUS at 03:29

## 2022-12-08 RX ADMIN — PERFLUTREN 1 ML: 6.52 INJECTION, SUSPENSION INTRAVENOUS at 10:17

## 2022-12-08 RX ADMIN — Medication 5 UNITS: at 12:27

## 2022-12-08 RX ADMIN — CLOPIDOGREL BISULFATE 75 MG: 75 TABLET ORAL at 09:04

## 2022-12-08 RX ADMIN — TAMSULOSIN HYDROCHLORIDE 0.4 MG: 0.4 CAPSULE ORAL at 09:04

## 2022-12-08 RX ADMIN — Medication 2 UNITS: at 09:04

## 2022-12-08 RX ADMIN — EMPAGLIFLOZIN 10 MG: 10 TABLET, FILM COATED ORAL at 09:04

## 2022-12-08 RX ADMIN — SACUBITRIL AND VALSARTAN 1 TABLET: 24; 26 TABLET, FILM COATED ORAL at 09:04

## 2022-12-08 NOTE — DIABETES MGMT
[address] [facility]  DIABETES MANAGEMENT CONSULT    Consulted by Provider for advanced nursing evaluation and care for inpatient blood glucose management. Evaluation and Action Plan   Serge Garcia is a 78yo male admitted for NSTEM, s/p cardiac cath no PCI intervention- T2D on oral diabetes medications PTA (Januvia/Jardiance/ Metformin/ and Glipizide)-A1C as of Oct 2022 7.0%, Current A1C 6.5% -BG trneds noted to be elevated today despite correction and aloglipitin therapies. To get BG into tight control while hospitlaized recommend starting daily basal/correction insulin regiment to get BG to 140-180 while hosptialized. Noted Jardiance also ordered, 10mg- appropriate. Prandial hyperglycemia noted before lunch, started low dose bolus insulin to assist with BG control. Management Rationale Action Plan   Medication   Basal needs Not ordered CONTINUE  0.2u/kg basal insulin- Lantus 12 units daily   Nutritional needs 0.05u/kg based on weight/BMI Start Lispro 3 units TID    Corrective insulin Using normal  sensitivity based on BMI CONTINUE   Overriding steroid effect NA    Referral []        Outpatient diabetes education  [] Behavioral health  [] Inpatient nutrition services  [] Pharmacy services          Initial Presentation   Serge Garcia is a 71 y.o. male admitted 12/5/22  after experiencing Sob. This has been going on for about a week. The shortness of breath is worse with mild exertion such as walking and also when the patient is lying down. The patient also complained of chest pain which is located at the center of the chest.  When the patient arrived at the emergency room, CTA of the chest was obtained and this was negative for pulmonary embolism. S/p CABG 2018. PCI 2/2022. The patient's most recent echocardiogram which was done last month shows ejection fraction of 45-50%.   LAB: troponin 2720 at admission      HX:   Past Medical History:   Diagnosis Date    CAD (coronary artery disease) 11/10/2016    NSTEMI & 2 stents    Deafness 10/28/2012    DM (diabetes mellitus) ([facility]     Elevated cholesterol     Hypertension     NSTEMI (non-ST elevated myocardial infarction) ([facility] 11/10/2016        INITIAL DX:   [facility]non[facility]ST elevated [facility] [I21.4]     Current Treatment     TX: heparin infusion    Hospital Course   Clinical progress has been complicated by NSTEMI with subsequent cardiac cath. .     12/8: pulmonary nodules new per pulmonology: BPs soft per nursing notes-ECHO pending  Diabetes History   T2D, A1C as of Oct. 2022-7.0-  Current A1C 6.5%    Diabetes-related Medical History  Acute complications  Persistent hyperglycemia  Neurological complications  NONE  Microvascular disease  Nephropathy-CKD  Macrovascular disease  CAD and Myocardial infarction- CABG and stents PTA  Other associated conditions     HTN    Diabetes Medication History  Key Antihyperglycemic Medications               glipiZIDE (GLUCOTROL) 5 mg tablet (Taking) Take 1 tablet by mouth twice daily    metFORMIN (GLUCOPHAGE) 1,000 mg tablet (Taking) TAKE 1 TABLET BY MOUTH TWICE DAILY WITH MEALS    Jardiance 25 mg tablet (Taking) TAKE 1 TABLET BY MOUTH IN THE MORNING    Januvia 50 mg tablet (Taking) Take 1 tablet by mouth once daily             Diabetes self-management practices:   Eating pattern-mostly veggies/consumes 'diabetes rice\"    [x] Eating a carbohydrate-controlled meal plan    Physical activity pattern-none -orthopedic issues     Monitoring pattern-was not monitoring BG      Taking medications pattern  [x] Consistent administration  [x] Affordable  Social determinants of health impacting diabetes self-management practices   Concerned that you need to know more about how to stay healthy with diabetes  Overall evaluation:    [x] Achieving A1c target with drug therapy & self-care practices    Subjective   I have not been checking my BG .      Objective   Physical exam  General Normal weight male in no acute distress. Conversant and cooperative  Neuro  Alert, oriented   Vital Signs Visit Vitals  BP (!) 100/43 (BP 1 Location: Left upper arm, BP Patient Position: Sitting)   Pulse 79   Temp 97.6 °F (36.4 °C)   Resp 24   Ht 5' 9\" (1.753 m)   Wt 59 kg (130 lb)   SpO2 97%   BMI 19.20 kg/m²     Skin  Warm and dry. A  Heart   Regular rate and rhythm.  No murmurs, rubs or gallops  Lungs  Clear to auscultation without rales or rhonchi  Extremities No foot wounds         Laboratory  Recent Labs     12/08/22  0328 12/06/22  0353 12/05/22  2200 12/05/22  1618   * 159*  --  161*   AGAP 8 9  --  9   WBC 5.9 5.9 6.2 6.6   CREA 1.66* 1.70*  --  1.71*   AST  --  16  --  18   ALT  --  21  --  23         Factors impacting BG management  Factor Dose Comments   Nutrition:  Standard meals     60 grams/meal      Other:   Kidney function  Liver function     Cr+1.70/eGFR 44      Blood glucose pattern    Significant diabetes-related events over the past 24-72 hours  T2D, A1C 6.5%  NSTEMI  Aloglipitn discontinued/ Jardiance 10mg ordered  BG trends stabilized overnight, fasting , pre prandial   Basal : Lantus 12 units daily-  Bolus: low dose 3 units TID with meals  Correction: normal sensitivity    Assessment and Nursing Intervention   Nursing Diagnosis Risk for unstable blood glucose pattern   Nursing Intervention Domain 5250 Decision-making Support   Nursing Interventions Examined current inpatient diabetes/blood glucose control   Explored factors facilitating and impeding inpatient management  Explored corrective strategies with patient and responsible inpatient provider   Informed patient of rational for insulin strategy while hospitalized     Nursing Diagnosis 60297 Ineffective Health Management   Nursing Intervention Domain 5250 Decision-making Support   Nursing Interventions Identified diabetes self-management practices impeding diabetes control  Discussed diabetes survival skills related to  Healthy Plate eating plan; given handouts  Role of physical activity in improving insulin sensitivity and action  Procedure for blood glucose monitoring & options for low-cost products  Medications plan at discharge         Tamika Ramos CNS  Diabetes Clinical Nurse Specialist  [facility]  Access via [employer]

## 2022-12-08 NOTE — TELEPHONE ENCOUNTER
----- Message from Pool Haque sent at 12/8/2022 11:49 AM EST -----  Subject: Message to Provider    QUESTIONS  Information for Provider? JULIA clinical team member with [facility] notifying PCP   of 12/5 admission to [facility]   ---------------------------------------------------------------------------  --------------  [address]  [phone]; Do not leave any message, patient will call back for answer  ---------------------------------------------------------------------------  --------------  SCRIPT ANSWERS  Relationship to Patient? Third Party  Third Party Type? Insurance?    Representative Name? Eva Ramos with [facility]

## 2022-12-08 NOTE — DISCHARGE INSTRUCTIONS
You had a stent to your leg artery and we have adjusted your medications  The office will get you an appointment next week with the nurse practitioner    Future Appointments   Date Time Provider Mina Sandy   2/1/2023  3:40 PM MD TEZ Dumas   2/14/2023  8:40 AM MD HI Giraldo BS AMB

## 2022-12-08 NOTE — PROGRESS NOTES
PCCM:    VSS, on room air     HgB 8.9    Plan:    Pulmonary nodules   Follow up PET scan in 1-2 wks with Dr Cadena Suzy   We will be available again to see if needed     Maricruz Joshi PA-C

## 2022-12-08 NOTE — DISCHARGE SUMMARY
[facility] [address] [place] [address], a Division of [address] [place] [address] and [facility]. Cardiology Discharge Summary     Patient ID:  Gayathri Kyle [id number] 71 y.o. 1950  PCP=Leonel Nichols MD     Admit Date: 12/5/2022  . .. Lucretia Fanning Lucretia Fanning Discharge Date: 12/8/2022   Admitting Physician: Zeyad Carbajal MD    ... Lucretia Fanning Lucretia Fanning Discharge Physician: Rupal Goss MD  Discharge Condition: Good    Cardiology Procedures this Admission:   12/6/22 -- Diagnostic left heart catheterization  And angioplasty of right common femoral artery  Consults: Cardiology  Present on Admission:   NSTEMI (non-ST elevated myocardial infarction) [facility])     Hospital Course: Gayathri Kyle is a 71 y.o. 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 There is a long stent from the left main down the OM 2 via the circumflex. This shows some proximal in-stent restenosis in the circumflex and in the left main prior to the stent there is some ostial plaque that is worse from prior picture. The OM 2 appears to be patent. The OM1 is heavily diseased and is compared to prior visit cath shows that the bifurcation and the main vessel are more severely diseased and a smaller caliber unlikely to improve with stenting. Patient is treated medically. We attempted to treat with her heart failure but he had trouble tolerating some meds overnight because of relative hypotension. He did have some low blood pressures but was completely asymptomatic. He is somewhat variable historian but reports that he had no shortness of breath the day of discharge. I walked him in the ravi he appeared to be steady without shortness of breath. I spoke to his wife personally. He will follow-up in the office on the below medications.   His blood pressures can limit his heart failure treatment significantly. His coronaries has progressed with continuing atherosclerosis despite a very good LDL on a high potency statin. PTA of right CFA for claudication    CHF treated with  Limited in all meds by hypotension,     Walked before discharge  Spoke to wife    Cath 12/6/22  Brief Procedure note     Findings:  1) High normal LVEDP  2) Severe native 3 vessel coronary artery disease. Significant diffuse disease in Lcx system with ISR. Moderate left main ostial disease. Patent LIMA to LAD as well as VG to RCA(ostial 50% stenosis)  3)Severe right femoral disease with mid SFA 99% stenosis treated with DCB angioplasty 6 mm INpact balloon with residual 40% stenosis Small non flow limiting dissection. Contrast: cc  Access: Left femora US guided     Recommendations  1)GDMT for secondary prevention  2)LCx and left main disease best treated with OMT and cardiac rehab. 3) Continue Plavix based DAPT     10/27/22    ECHO ADULT COMPLETE 10/27/2022 10/27/2022    Interpretation Summary    Left Ventricle: Mildly reduced left ventricular systolic function with a visually estimated EF of 45 - 50%. Left ventricle size is normal. Normal wall thickness. Normal wall motion. Abnormal diastolic function. Right Ventricle: Reduced systolic function. TAPSE is 1.2 cm. Aortic Valve: Valve structure is normal. Mild sclerosis of the aortic valve cusp. Mild annular calcification. Mild regurgitation. Mild stenosis of the aortic valve. AV mean gradient is 7 mmHg. AV peak gradient is 13 mmHg. LVOT:AV VTI Index is 0.55. AV area by peak velocity is 1.4 cm2. Mitral Valve: Moderately thickened leaflet. Mild annular calcification of the mitral valve. The posterior leaflet of the mitral valve has restricted mobility. Mild regurgitation. Left Atrium: Left atrium is dilated.     Signed by: Rupal Goss MD on 10/27/2022  4:49 PM      Physical Exam:  Patient Vitals for the past 12 hrs:   Temp Pulse Resp BP SpO2   12/08/22 1400 -- 78 -- -- --   12/08/22 1205 97.6 °F (36.4 °C) 79 24 (!) 100/43 97 %   12/08/22 1200 -- 76 -- -- --   12/08/22 1000 -- 78 -- -- --   12/08/22 0904 -- 74 -- (!) 112/50 --   12/08/22 0830 97.8 °F (36.6 °C) 78 16 (!) 106/57 95 %   12/08/22 0800 -- -- -- -- 96 %   12/08/22 0600 -- 82 -- -- --   12/08/22 0400 -- 86 -- -- --   12/08/22 0300 98.6 °F (37 °C) 79 20 (!) 110/56 94 %      Visit Vitals  BP (!) 100/43 (BP 1 Location: Left upper arm, BP Patient Position: Sitting)   Pulse 78   Temp 97.6 °F (36.4 °C)   Resp 24   Ht 5' 9\" (1.753 m)   Wt 130 lb (59 kg)   SpO2 97%   BMI 19.20 kg/m²     HEENT=NC,AT  Neck: supple, symmetrical, trachea midline,  no JVD  Lungs: clear to auscultation bilaterally  Heart: regular rate and rhythm, S1, S2 normal, no murmurs, clicks, rubs or gallops  Abdomen: soft, non-tender. Extremities: no LE edema,HNeurologic: Grossly normal      Labs:   Recent Labs     12/08/22  0328 12/06/22  0353 12/05/22  2200   WBC 5.9 5.9 6.2   HGB 8.9* 9.9* 10.1*   HCT 30.0* 32.5* 34.4*    296 332     Recent Labs     12/08/22  0328 12/06/22  0353 12/05/22  1618    136 136   K 3.8 4.2 4.2    104 103   CO2 23 23 24   * 159* 161*   BUN 36* 36* 30*   CREA 1.66* 1.70* 1.71*   CA 8.6 9.4 9.6   MG  --  2.4  --    PHOS  --  5.0*  --    ALB  --  3.4* 3.8   ALT  --  21 23   INR  --   --  1.0       No results for input(s): TROIQ, CPK, CKMB in the last 72 hours.     Results for orders placed or performed during the hospital encounter of 12/05/22   EKG, 12 LEAD, INITIAL   Result Value Ref Range    Ventricular Rate 128 BPM    Atrial Rate 128 BPM    P-R Interval 116 ms    QRS Duration 76 ms    Q-T Interval 322 ms    QTC Calculation (Bezet) 470 ms    Calculated P Axis 68 degrees    Calculated R Axis 62 degrees    Calculated T Axis -161 degrees    Diagnosis       Sinus tachycardia  Right atrial enlargement  Marked ST abnormality, possible inferolateral subendocardial injury  Abnormal ECG  When compared with ECG of 06-JUL-2018 14:34,  premature ventricular complexes are no longer present  Vent. rate has increased BY  59 BPM  ST more depressed in Lateral leads  T wave inversion now evident in Inferior leads  T wave inversion now evident in Anterolateral leads  Confirmed by VCS, GROUP ANDRZEJ ([id number]),  Martine Bran ([id number]) on   12/5/2022 4:51:15 PM      XR Results (most recent):  Results from [place] encounter on 12/05/22    XR CHEST PA LAT    Narrative  EXAM:  XR CHEST PA LAT    INDICATION:   shortness of breath    COMPARISON: Chest radiograph 6/13/2018. FINDINGS: PA and lateral radiographs of the chest were obtained. Trace bilateral pleural effusions with bibasilar atelectasis. Pulmonary vascular  congestion without overt pulmonary edema. No pneumothorax. Stable postsurgical  changes of CABG. Heart size is within normal limits. No acute osseous  abnormality. Cholelithiasis. Impression  1. Trace bilateral pleural effusions with bibasilar atelectasis. 2. Pulmonary vascular congestion without overt pulmonary edema. 3. Cholelithiasis. Disposition: home    Patient Instructions:   Current Discharge Medication List        START taking these medications    Details   sacubitriL-valsartan (Entresto) 24-26 mg tablet Take 1 Tablet by mouth two (2) times a day. Qty: 180 Tablet, Refills: 1      dapagliflozin (FARXIGA) 10 mg tab tablet Take 1 Tablet by mouth daily.   Qty: 90 Tablet, Refills: 1           CONTINUE these medications which have NOT CHANGED    Details   glipiZIDE (GLUCOTROL) 5 mg tablet Take 1 tablet by mouth twice daily  Qty: 180 Tablet, Refills: 1    Associated Diagnoses: Type 2 diabetes mellitus with hyperglycemia, without long-term current use of insulin ([facility])      metFORMIN (GLUCOPHAGE) 1,000 mg tablet TAKE 1 TABLET BY MOUTH TWICE DAILY WITH MEALS  Qty: 180 Tablet, Refills: 1      tamsulosin (FLOMAX) 0.4 mg capsule Take 1 capsule by mouth once daily  Qty: 60 Capsule, Refills: 0 Associated Diagnoses: BPH associated with nocturia      ipratropium (ATROVENT) 21 mcg (0.03 %) nasal spray 2 Sprays by Both Nostrils route every twelve (12) hours. Qty: 30 mL, Refills: 0    Associated Diagnoses: Bronchitis      nitroglycerin (NITROSTAT) 0.4 mg SL tablet 1 Tablet by SubLINGual route every five (5) minutes as needed for Chest Pain. Up to 3 doses. Qty: 25 Tablet, Refills: 3    Associated Diagnoses: Coronary artery disease involving native coronary artery of native heart without angina pectoris      Jardiance 25 mg tablet TAKE 1 TABLET BY MOUTH IN THE MORNING  Qty: 90 Tablet, Refills: 0    Associated Diagnoses: Type 2 diabetes mellitus with hyperglycemia, without long-term current use of insulin (HCC)      ergocalciferol (ERGOCALCIFEROL) 1,250 mcg (50,000 unit) capsule Take 1 Capsule by mouth every seven (7) days. Qty: 12 Capsule, Refills: 0    Associated Diagnoses: Vitamin D deficiency      Januvia 50 mg tablet Take 1 tablet by mouth once daily  Qty: 90 Tablet, Refills: 0      polyethylene glycol (MIRALAX) 17 gram/dose powder Take 17 g by mouth daily. Qty: 510 g, Refills: 0    Associated Diagnoses: Chronic constipation      clopidogreL (Plavix) 75 mg tab Take 1 Tablet by mouth daily for 360 days. Indications: a sudden worsening of angina called acute coronary syndrome  Qty: 30 Tablet, Refills: 11      rosuvastatin (CRESTOR) 20 mg tablet Take 1 Tablet by mouth nightly. Qty: 90 Tablet, Refills: 3      aspirin delayed-release 81 mg tablet Take 1 Tab by mouth. albendazole (ALBENZA) 200 mg tablet TAKE 2 TABLETS BY MOUTH ONCE FOR 1 DOSE.  THEN REPEAT IN ONE WEEK  Qty: 60 Tablet, Refills: 0           STOP taking these medications       furosemide (LASIX) 20 mg tablet Comments:   Reason for Stopping:         isosorbide mononitrate ER (IMDUR) 30 mg tablet Comments:   Reason for Stopping:         hydroCHLOROthiazide (HYDRODIURIL) 25 mg tablet Comments:   Reason for Stopping:             Specialty Problems          Cardiology Problems    Coronary artery disease involving native coronary artery of native heart without angina pectoris        Dyslipidemia, goal LDL below 70        Nonrheumatic aortic valve stenosis        Hypertension, essential        * (Principal) NSTEMI (non-ST elevated myocardial infarction) [facility])         Reference discharge instructions provided by nursing for diet and activity. Follow-up with =   In 1-2 weeks with our office NP  Future Appointments   Date Time Provider Mina Sandy   12/19/2022  8:20 AM Asia Mccormick NP [facility] AMB   2/1/2023  3:00 PM MARY ANN CACERES BS AMB   2/1/2023  3:40 PM Monik Ambrose MD [facility] AMB   2/14/2023  8:40 AM Estevan Hess MD [facility] BS AMB     Signed:  Jennifer Mars MD  This note was created using voice recognition software. Despite editing, there may be syntax errors. Greater than 30 minutes on discharge planning, examination, documentation , coordination of care.

## 2022-12-08 NOTE — PROGRESS NOTES
Patient states that he feels very weak and tired. Blood pressures noted to be on the softer side. Call bell within reach. Will  continue to monitor.

## 2022-12-08 NOTE — PROGRESS NOTES
Bedside and Verbal shift change report given to Shoshana Spurling, RN (oncoming nurse) by Ian Solorio RN (offgoing nurse). Report included the following information SBAR and Kardex.

## 2022-12-09 ENCOUNTER — PATIENT OUTREACH (OUTPATIENT)
Dept: CASE MANAGEMENT | Age: 72
End: 2022-12-09

## 2022-12-09 LAB
ECHO AO ROOT DIAM: 2.8 CM
ECHO AO ROOT INDEX: 1.63 CM/M2
ECHO LA DIAMETER INDEX: 2.5 CM/M2
ECHO LA DIAMETER: 4.3 CM
ECHO LA TO AORTIC ROOT RATIO: 1.54
ECHO LV FRACTIONAL SHORTENING: 22 % (ref 28–44)
ECHO LV INTERNAL DIMENSION DIASTOLE INDEX: 3.66 CM/M2
ECHO LV INTERNAL DIMENSION DIASTOLIC: 6.3 CM (ref 4.2–5.9)
ECHO LV INTERNAL DIMENSION SYSTOLIC INDEX: 2.85 CM/M2
ECHO LV INTERNAL DIMENSION SYSTOLIC: 4.9 CM
ECHO LV IVSD: 0.7 CM (ref 0.6–1)
ECHO LV MASS 2D: 116.2 G (ref 88–224)
ECHO LV MASS INDEX 2D: 67.6 G/M2 (ref 49–115)
ECHO LV POSTERIOR WALL DIASTOLIC: 0.3 CM (ref 0.6–1)
ECHO LV RELATIVE WALL THICKNESS RATIO: 0.1

## 2022-12-09 PROCEDURE — 93325 DOPPLER ECHO COLOR FLOW MAPG: CPT | Performed by: SPECIALIST

## 2022-12-09 PROCEDURE — 93308 TTE F-UP OR LMTD: CPT | Performed by: SPECIALIST

## 2022-12-09 NOTE — PROCEDURES
Findings  1. Normal LVEDP  2. Severe native multivessel coronary artery disease  3. Patent LIMA to LAD and vein graft to distal RCA  4. Recurrent ISR in OM1 stent with now 60 to 70% restenosis  5. Recoil of left main and circumflex stent with now recurrent 40 to 50% stenosis. 6.  Progression of ostial left main disease now to about 60% stenosis  7. Progression of disease in jailed first marginal branch now with diffuse 90% stenosis  8. High-grade stenosis in the mid to distal right potential femoral artery treated with 6 x 40 mm impact drug-coated balloon angioplasty to reduce the stenosis to less than 40%    Access: Right femoral ultrasound-guided no issues  Contrast 60 cc    Recommendations  1. Continue guideline directed medical therapy for secondary prevention of coronary events  2. Diffuse nature of disease and relatively small size of his arteries make medical management is the best possible option for his coronary artery disease.   3.  Plavix based dual antiplatelet therapy

## 2022-12-09 NOTE — PROGRESS NOTES
Care Transitions Initial Call    Call within 2 business days of discharge: Yes     Patient: Osmin Noble Patient : 1950 MRN: [id number]    Last Discharge [address]       Date Complaint Diagnosis Description Type Department Provider    22 Chest Pain (Angina) NSTEMI (non-ST elevated myocardial infarction) ([facility] [address] . .. ED to Hosp-Admission (Discharged) (ADMIT) Caryl Fenton MD; Mariposa Canseco. .. Was this an external facility discharge? No    Challenges to be reviewed by the provider   Additional needs identified to be addressed with provider: yes    NSTEMI- s/p cath done - significant diffuse disease. PTA done to right femoral (small nonflow limited dissection). New medications:  Entresto 24/26 mg BID; Faxiga 10 mg daily. Pharmacy ordering, may be able to  today. Advised to continue isosorbide 30 mg daily until can start Entresto. Continue: Plavix 75 mg daily, ASA 81 mg daily. Stop: furosemide 20 mg, HCTZ 25 mg, isosorbide 30 mg. Referral to [facility] at [facility]. Follow up with Lauryn Gonzalez NP on  at 8:45. Continues with chest congestion and cough- advised to continue using robitussin DM. Increase hydration to 64 ounces daily. Continue nasal spray. Encouraged foods rich in Vitamin C and zinc, including OTC supplements. New Multiple pulmonary nodules- pulmonary consulted. Follow up with Dr. Pauline Hernandez- 1-2 weeks with PET scan. Secured appt for  at 2:45. Assisting with scheduling PET scan. Loving PET will need to fit in prior to OV. DM2- does not check blood glucose values at home. Does check BP values, will start weighing daily. Method of communication with provider : staff msg to Dr. Chicho Arriaga about vascular follow up- response from Dr. Chicho Arriaga-  3 mo follow up with him and vascular study- scheduled with wife. Note- drug coated balloon placed. The balloon is coated 3.5 micrograms per millimeter squared dose.  When the balloon is inflated in the artery, the drug comes off the balloon, is taken up into the vessel wall, and sticks around for at least 2 months, long enough to have an impact. chart routing to PCP. Staff message to primary cardiology. Phone call to pulmonary office. Discussed COVID-19 related testing which was available at this time. Test results were negative. Patient informed of results, if available? Family aware     Advance Care Planning:   Does patient have an Advance Directive: not on file. Inpatient Readmission Risk score: Unplanned Readmit Risk Score: 19.4    Was this a readmission? no     Patients top risk factors for readmission: medical condition-  and to be further assessed    Interventions to address risk factors: Scheduled appointment with Specialist-Cardiology with vascular, pulmonary, Communication with specialists who will assume or re-assume care of the patient's system-specific problems-pulmonary and cardiology-vascular, Education of patient/family/caregiver/guardian to support self-management- , Assessment and support for treatment adherence and medication management- , and communication with PCP, assistance with securing date for PET scan to be done    Care Transition Nurse (CTN) contacted the patient by telephone to perform post hospital discharge assessment. Verified name and  with  spouse, Yissel Espinoza  as identifiers. Provided introduction to self, and explanation of the CTN role. CTN reviewed discharge instructions, medical action plan and red flags with family who verbalized understanding. Were discharge instructions available to patient? yes. Reviewed appropriate site of care based on symptoms and resources available to patient including: PCP, Specialist, After hours contact number-PCP and specialist, [facility], and using patient portal for providers and CTN . Family given an opportunity to ask questions and does not have any further questions or concerns at this time. The family agrees to contact the PCP office for questions related to their healthcare. Medication reconciliation was performed with family, who verbalizes understanding of administration of home medications. Advised obtaining a 90-day supply of all daily and as-needed medications. Referral to Pharm D needed: no     Referral to Cardiac Rehab at [facility]. Durable Medical Equipment ordered at discharge: None    Was patient discharged with a pulse oximeter? no    Discussed follow-up appointments. If no appointment was previously scheduled, appointment scheduling offered: yes. Is follow up appointment scheduled within 7 days of discharge? yes. [facility] follow up appointment(s):   Future Appointments   Date Time Provider Mina Delgado   12/19/2022  8:20 AM Royal Mccormick NP [facility] AMB   2/1/2023  3:00 PM MARY ANN CACERES BS AMB   2/1/2023  3:40 PM Monik Ambrose MD [facility] AMB   2/14/2023  8:40 AM MD HI Zavala BS AMB   3/15/2023  1:00 PM VASCULAR, MARY ANN REAGAN BS AMB   3/15/2023  1:40 PM MD TEZ Valadez BS AMB     Non-[facility] follow up appointment(s):   Pulmonary- Dr. Harmony Andrews- 1-2 weeks with PET scan. CTN reached out to office, secured follow up on 12/19 at 2:45. Office will place order for PET, CTN will assist with securing appointment with Lucia De Santiago- Ms. Kendall requested assistance. Vascular surgery- TBD. Wife states he has not seen vascular surgery in past.  CTN reached out to interventionalist, Dr. Emmanuel Pendleton to determine plan for follow up. Nephrology- Dr. Shah [place] for follow-up call in 5-7 days based on severity of symptoms and risk factors. Plan for next call:   Assess current symptoms including daily monitoring items, off diuretics, started Entresto. Attended follow up with cardiology, plan in place for attending cardiac rehab at [facility]. Attended follow up with pulmonary, and PET scan done.    Plan for follow up after PTA done to Rt. Femoral artery. CTN provided contact information for future needs. Goals Addressed                   This Visit's Progress       Myocardial Infarction     Patient verbalizes understanding of self -management goals after a Myocardial Infaraction. 12/12/22- CTN continuing to work to secure order and appointment for PET scan needed. Left VM at [address] [facility], central scheduling does not have order for PET scan, CTN received VM last Friday, Pulmonary office asking for fax and/or phone number to send in order to PET scan. Spoke with office, message taken, asked Megan Cerna to call in order to [phone] and ICD 10 code needed is:  R91.8 bilateral pulmonary nodules. Saw that he was back in ED- being admitted, spoke with Ariel Garcia, [facility] in ED, updated her. CTN reached out to cardiology- consulted to update as well. [facility]     12/9/22- spoke with his wife, Carter Freeman. Reviewed BP value parameters:  >100/50's. If SBP 90's, he is without symptoms of lightheaded, dizziness, etc, can monitor. If SBP 80's and/or symptoms call cardiology for guidance. Pharmacy had to order [place]. Advised him to take imdur 30 mg today if she does not have Entresto to administer. Monitor daily weight, reviewed good routine for weighing, reason for weighing, he is no off HCTZ and lasix. Teaching done on plavix. CTN clarifying with interventional provider if he placed stent in right femoral vs PTA only and determine follow up needed. Goal: to return to ADL's without symptoms- related to breathing and  able to increase activity level. Updated wife on OV with pulmonary, PET to be scheduled.         [facility]

## 2022-12-11 ENCOUNTER — APPOINTMENT (OUTPATIENT)
Dept: GENERAL RADIOLOGY | Age: 72
End: 2022-12-11
Attending: EMERGENCY MEDICINE
Payer: MEDICARE

## 2022-12-11 ENCOUNTER — HOSPITAL ENCOUNTER (INPATIENT)
Age: 72
LOS: 4 days | Discharge: HOME HEALTH | End: 2022-12-16
Attending: EMERGENCY MEDICINE | Admitting: STUDENT IN AN ORGANIZED HEALTH CARE EDUCATION/TRAINING PROGRAM
Payer: MEDICARE

## 2022-12-11 DIAGNOSIS — E11.22 CKD STAGE 3 DUE TO TYPE 2 DIABETES MELLITUS (HCC): ICD-10-CM

## 2022-12-11 DIAGNOSIS — N18.30 CKD STAGE 3 DUE TO TYPE 2 DIABETES MELLITUS (HCC): ICD-10-CM

## 2022-12-11 DIAGNOSIS — I10 HYPERTENSION, ESSENTIAL: ICD-10-CM

## 2022-12-11 DIAGNOSIS — I50.9 CONGESTIVE HEART FAILURE, UNSPECIFIED HF CHRONICITY, UNSPECIFIED HEART FAILURE TYPE (HCC): ICD-10-CM

## 2022-12-11 DIAGNOSIS — Z95.1 S/P CABG X 3: ICD-10-CM

## 2022-12-11 DIAGNOSIS — I50.43 ACUTE ON CHRONIC COMBINED SYSTOLIC AND DIASTOLIC CONGESTIVE HEART FAILURE (HCC): ICD-10-CM

## 2022-12-11 DIAGNOSIS — E87.5 ACUTE HYPERKALEMIA: ICD-10-CM

## 2022-12-11 DIAGNOSIS — R65.20 SEVERE SEPSIS (HCC): Primary | ICD-10-CM

## 2022-12-11 DIAGNOSIS — E78.5 DYSLIPIDEMIA, GOAL LDL BELOW 70: ICD-10-CM

## 2022-12-11 DIAGNOSIS — J18.9 PNEUMONIA OF BOTH LUNGS DUE TO INFECTIOUS ORGANISM, UNSPECIFIED PART OF LUNG: ICD-10-CM

## 2022-12-11 DIAGNOSIS — A41.9 SEVERE SEPSIS (HCC): Primary | ICD-10-CM

## 2022-12-11 DIAGNOSIS — N28.9 ACUTE RENAL INSUFFICIENCY: ICD-10-CM

## 2022-12-11 DIAGNOSIS — E11.65 TYPE 2 DIABETES MELLITUS WITH HYPERGLYCEMIA, UNSPECIFIED WHETHER LONG TERM INSULIN USE (HCC): ICD-10-CM

## 2022-12-11 LAB
ALBUMIN SERPL-MCNC: 3.6 G/DL (ref 3.5–5)
ALBUMIN/GLOB SERPL: 0.8 {RATIO} (ref 1.1–2.2)
ALP SERPL-CCNC: 85 U/L (ref 45–117)
ALT SERPL-CCNC: 19 U/L (ref 12–78)
ANION GAP SERPL CALC-SCNC: 7 MMOL/L (ref 5–15)
AST SERPL-CCNC: 11 U/L (ref 15–37)
BASE DEFICIT BLD-SCNC: 5.4 MMOL/L
BILIRUB SERPL-MCNC: 0.5 MG/DL (ref 0.2–1)
BNP SERPL-MCNC: 2384 PG/ML
BUN SERPL-MCNC: 30 MG/DL (ref 6–20)
BUN/CREAT SERPL: 18 (ref 12–20)
CA-I BLD-MCNC: 1.22 MMOL/L (ref 1.12–1.32)
CALCIUM SERPL-MCNC: 9 MG/DL (ref 8.5–10.1)
CHLORIDE BLD-SCNC: 109 MMOL/L (ref 100–108)
CHLORIDE SERPL-SCNC: 107 MMOL/L (ref 97–108)
CO2 BLD-SCNC: 19 MMOL/L (ref 19–24)
CO2 SERPL-SCNC: 20 MMOL/L (ref 21–32)
COMMENT, HOLDF: NORMAL
CREAT SERPL-MCNC: 1.63 MG/DL (ref 0.7–1.3)
CREAT UR-MCNC: 1.4 MG/DL (ref 0.6–1.3)
GLOBULIN SER CALC-MCNC: 4.3 G/DL (ref 2–4)
GLUCOSE BLD STRIP.AUTO-MCNC: 290 MG/DL (ref 74–106)
GLUCOSE SERPL-MCNC: 295 MG/DL (ref 65–100)
HCO3 BLDA-SCNC: 19 MMOL/L
LACTATE BLD-SCNC: 2.38 MMOL/L (ref 0.4–2)
MAGNESIUM SERPL-MCNC: 2.3 MG/DL (ref 1.6–2.4)
PCO2 BLDV: 34.4 MMHG (ref 41–51)
PH BLDV: 7.36 [PH] (ref 7.32–7.42)
PO2 BLDV: 27 MMHG (ref 25–40)
POTASSIUM BLD-SCNC: 6.2 MMOL/L (ref 3.5–5.5)
POTASSIUM SERPL-SCNC: 6 MMOL/L (ref 3.5–5.1)
PROT SERPL-MCNC: 7.9 G/DL (ref 6.4–8.2)
SAMPLES BEING HELD,HOLD: NORMAL
SODIUM BLD-SCNC: 135 MMOL/L (ref 136–145)
SODIUM SERPL-SCNC: 134 MMOL/L (ref 136–145)
SPECIMEN SITE: ABNORMAL

## 2022-12-11 PROCEDURE — 87040 BLOOD CULTURE FOR BACTERIA: CPT

## 2022-12-11 PROCEDURE — 74011250636 HC RX REV CODE- 250/636: Performed by: EMERGENCY MEDICINE

## 2022-12-11 PROCEDURE — 36415 COLL VENOUS BLD VENIPUNCTURE: CPT

## 2022-12-11 PROCEDURE — 85025 COMPLETE CBC W/AUTO DIFF WBC: CPT

## 2022-12-11 PROCEDURE — 93005 ELECTROCARDIOGRAM TRACING: CPT

## 2022-12-11 PROCEDURE — 82077 ASSAY SPEC XCP UR&BREATH IA: CPT

## 2022-12-11 PROCEDURE — 87077 CULTURE AEROBIC IDENTIFY: CPT

## 2022-12-11 PROCEDURE — 71045 X-RAY EXAM CHEST 1 VIEW: CPT

## 2022-12-11 PROCEDURE — 96360 HYDRATION IV INFUSION INIT: CPT

## 2022-12-11 PROCEDURE — 99285 EMERGENCY DEPT VISIT HI MDM: CPT

## 2022-12-11 PROCEDURE — 80053 COMPREHEN METABOLIC PANEL: CPT

## 2022-12-11 PROCEDURE — 82947 ASSAY GLUCOSE BLOOD QUANT: CPT

## 2022-12-11 PROCEDURE — 84145 PROCALCITONIN (PCT): CPT

## 2022-12-11 PROCEDURE — 84484 ASSAY OF TROPONIN QUANT: CPT

## 2022-12-11 PROCEDURE — 83880 ASSAY OF NATRIURETIC PEPTIDE: CPT

## 2022-12-11 PROCEDURE — 83735 ASSAY OF MAGNESIUM: CPT

## 2022-12-11 PROCEDURE — 87150 DNA/RNA AMPLIFIED PROBE: CPT

## 2022-12-11 RX ORDER — LEVOFLOXACIN 5 MG/ML
750 INJECTION, SOLUTION INTRAVENOUS
Status: DISCONTINUED | OUTPATIENT
Start: 2022-12-11 | End: 2022-12-12

## 2022-12-11 RX ADMIN — SODIUM CHLORIDE 1000 ML: 9 INJECTION, SOLUTION INTRAVENOUS at 23:24

## 2022-12-12 ENCOUNTER — OFFICE VISIT (OUTPATIENT)
Dept: EMERGENCY DEPT | Age: 72
End: 2022-12-12
Attending: EMERGENCY MEDICINE
Payer: MEDICARE

## 2022-12-12 ENCOUNTER — APPOINTMENT (OUTPATIENT)
Dept: CT IMAGING | Age: 72
End: 2022-12-12
Attending: STUDENT IN AN ORGANIZED HEALTH CARE EDUCATION/TRAINING PROGRAM
Payer: MEDICARE

## 2022-12-12 ENCOUNTER — TRANSCRIBE ORDER (OUTPATIENT)
Dept: SCHEDULING | Age: 72
End: 2022-12-12

## 2022-12-12 DIAGNOSIS — R91.8 LUNG MASS: Primary | ICD-10-CM

## 2022-12-12 PROBLEM — I50.9 CHF (CONGESTIVE HEART FAILURE) (HCC): Status: ACTIVE | Noted: 2022-01-01

## 2022-12-12 LAB
AMPHET UR QL SCN: NEGATIVE
ANION GAP SERPL CALC-SCNC: 7 MMOL/L (ref 5–15)
ARTERIAL PATENCY WRIST A: ABNORMAL
ATRIAL RATE: 133 BPM
B PERT DNA SPEC QL NAA+PROBE: NOT DETECTED
BARBITURATES UR QL SCN: NEGATIVE
BASE DEFICIT BLD-SCNC: 9.5 MMOL/L
BASOPHILS # BLD: 0.1 K/UL (ref 0–0.1)
BASOPHILS # BLD: 0.1 K/UL (ref 0–0.1)
BASOPHILS NFR BLD: 1 % (ref 0–1)
BASOPHILS NFR BLD: 1 % (ref 0–1)
BDY SITE: ABNORMAL
BENZODIAZ UR QL: NEGATIVE
BORDETELLA PARAPERTUSSIS PCR, BORPAR: NOT DETECTED
BUN SERPL-MCNC: 32 MG/DL (ref 6–20)
BUN/CREAT SERPL: 17 (ref 12–20)
C PNEUM DNA SPEC QL NAA+PROBE: NOT DETECTED
CALCIUM SERPL-MCNC: 8.5 MG/DL (ref 8.5–10.1)
CALCULATED P AXIS, ECG09: 66 DEGREES
CALCULATED R AXIS, ECG10: 85 DEGREES
CALCULATED T AXIS, ECG11: -146 DEGREES
CANNABINOIDS UR QL SCN: NEGATIVE
CHLORIDE SERPL-SCNC: 110 MMOL/L (ref 97–108)
CO2 SERPL-SCNC: 19 MMOL/L (ref 21–32)
COCAINE UR QL SCN: NEGATIVE
COMMENT, HOLDF: NORMAL
CREAT SERPL-MCNC: 1.86 MG/DL (ref 0.7–1.3)
DIAGNOSIS, 93000: NORMAL
DIFFERENTIAL METHOD BLD: ABNORMAL
DIFFERENTIAL METHOD BLD: ABNORMAL
DRUG SCRN COMMENT,DRGCM: NORMAL
EOSINOPHIL # BLD: 0 K/UL (ref 0–0.4)
EOSINOPHIL # BLD: 0.1 K/UL (ref 0–0.4)
EOSINOPHIL NFR BLD: 0 % (ref 0–7)
EOSINOPHIL NFR BLD: 1 % (ref 0–7)
ERYTHROCYTE [DISTWIDTH] IN BLOOD BY AUTOMATED COUNT: 18.2 % (ref 11.5–14.5)
ERYTHROCYTE [DISTWIDTH] IN BLOOD BY AUTOMATED COUNT: 21.2 % (ref 11.5–14.5)
ETHANOL SERPL-MCNC: <10 MG/DL
FLUAV SUBTYP SPEC NAA+PROBE: NOT DETECTED
FLUBV RNA SPEC QL NAA+PROBE: NOT DETECTED
GAS FLOW.O2 O2 DELIVERY SYS: ABNORMAL L/MIN
GLUCOSE SERPL-MCNC: 235 MG/DL (ref 65–100)
HADV DNA SPEC QL NAA+PROBE: NOT DETECTED
HCO3 BLD-SCNC: 13.7 MMOL/L (ref 22–26)
HCOV 229E RNA SPEC QL NAA+PROBE: NOT DETECTED
HCOV HKU1 RNA SPEC QL NAA+PROBE: NOT DETECTED
HCOV NL63 RNA SPEC QL NAA+PROBE: NOT DETECTED
HCOV OC43 RNA SPEC QL NAA+PROBE: NOT DETECTED
HCT VFR BLD AUTO: 28.9 % (ref 36.6–50.3)
HCT VFR BLD AUTO: 36.1 % (ref 36.6–50.3)
HGB BLD-MCNC: 10.8 G/DL (ref 12.1–17)
HGB BLD-MCNC: 8.3 G/DL (ref 12.1–17)
HMPV RNA SPEC QL NAA+PROBE: NOT DETECTED
HPIV1 RNA SPEC QL NAA+PROBE: NOT DETECTED
HPIV2 RNA SPEC QL NAA+PROBE: NOT DETECTED
HPIV3 RNA SPEC QL NAA+PROBE: NOT DETECTED
HPIV4 RNA SPEC QL NAA+PROBE: NOT DETECTED
IMM GRANULOCYTES # BLD AUTO: 0 K/UL (ref 0–0.04)
IMM GRANULOCYTES # BLD AUTO: 0 K/UL (ref 0–0.04)
IMM GRANULOCYTES NFR BLD AUTO: 0 % (ref 0–0.5)
IMM GRANULOCYTES NFR BLD AUTO: 0 % (ref 0–0.5)
LACTATE SERPL-SCNC: 2.8 MMOL/L (ref 0.4–2)
LACTATE SERPL-SCNC: 3.2 MMOL/L (ref 0.4–2)
LYMPHOCYTES # BLD: 0.8 K/UL (ref 0.8–3.5)
LYMPHOCYTES # BLD: 0.9 K/UL (ref 0.8–3.5)
LYMPHOCYTES NFR BLD: 10 % (ref 12–49)
LYMPHOCYTES NFR BLD: 10 % (ref 12–49)
M PNEUMO DNA SPEC QL NAA+PROBE: NOT DETECTED
MCH RBC QN AUTO: 23.1 PG (ref 26–34)
MCH RBC QN AUTO: 25.6 PG (ref 26–34)
MCHC RBC AUTO-ENTMCNC: 28.7 G/DL (ref 30–36.5)
MCHC RBC AUTO-ENTMCNC: 29.9 G/DL (ref 30–36.5)
MCV RBC AUTO: 80.5 FL (ref 80–99)
MCV RBC AUTO: 85.5 FL (ref 80–99)
METHADONE UR QL: NEGATIVE
MONOCYTES # BLD: 0.6 K/UL (ref 0–1)
MONOCYTES # BLD: 1 K/UL (ref 0–1)
MONOCYTES NFR BLD: 11 % (ref 5–13)
MONOCYTES NFR BLD: 8 % (ref 5–13)
NEUTS SEG # BLD: 6.3 K/UL (ref 1.8–8)
NEUTS SEG # BLD: 7.3 K/UL (ref 1.8–8)
NEUTS SEG NFR BLD: 78 % (ref 32–75)
NEUTS SEG NFR BLD: 80 % (ref 32–75)
NRBC # BLD: 0 K/UL (ref 0–0.01)
NRBC # BLD: 0 K/UL (ref 0–0.01)
NRBC BLD-RTO: 0 PER 100 WBC
NRBC BLD-RTO: 0 PER 100 WBC
O2/TOTAL GAS SETTING VFR VENT: 30 %
OPIATES UR QL: NEGATIVE
P-R INTERVAL, ECG05: 134 MS
PCO2 BLD: 22.6 MMHG (ref 35–45)
PCP UR QL: NEGATIVE
PEEP RESPIRATORY: 6 CMH2O
PH BLD: 7.39 [PH] (ref 7.35–7.45)
PLATELET # BLD AUTO: 257 K/UL (ref 150–400)
PLATELET # BLD AUTO: 313 K/UL (ref 150–400)
PLATELET COMMENTS,PCOM: ABNORMAL
PMV BLD AUTO: 11.2 FL (ref 8.9–12.9)
PMV BLD AUTO: 11.5 FL (ref 8.9–12.9)
PO2 BLD: 73 MMHG (ref 80–100)
POTASSIUM SERPL-SCNC: 5.2 MMOL/L (ref 3.5–5.1)
PRESSURE SUPPORT SETTING VENT: 12 CMH2O
PROCALCITONIN SERPL-MCNC: 0.05 NG/ML
Q-T INTERVAL, ECG07: 304 MS
QRS DURATION, ECG06: 82 MS
QTC CALCULATION (BEZET), ECG08: 452 MS
RBC # BLD AUTO: 3.59 M/UL (ref 4.1–5.7)
RBC # BLD AUTO: 4.22 M/UL (ref 4.1–5.7)
RBC MORPH BLD: ABNORMAL
RSV RNA SPEC QL NAA+PROBE: NOT DETECTED
RV+EV RNA SPEC QL NAA+PROBE: NOT DETECTED
SAMPLES BEING HELD,HOLD: NORMAL
SAO2 % BLD: 94.8 % (ref 92–97)
SARS-COV-2 PCR, COVPCR: NOT DETECTED
SODIUM SERPL-SCNC: 136 MMOL/L (ref 136–145)
SPECIMEN TYPE: ABNORMAL
TROPONIN-HIGH SENSITIVITY: 1559 NG/L (ref 0–76)
UR CULT HOLD, URHOLD: NORMAL
VENTRICULAR RATE, ECG03: 133 BPM
WBC # BLD AUTO: 7.9 K/UL (ref 4.1–11.1)
WBC # BLD AUTO: 9.3 K/UL (ref 4.1–11.1)

## 2022-12-12 PROCEDURE — 74011000258 HC RX REV CODE- 258: Performed by: EMERGENCY MEDICINE

## 2022-12-12 PROCEDURE — 36600 WITHDRAWAL OF ARTERIAL BLOOD: CPT

## 2022-12-12 PROCEDURE — 71250 CT THORAX DX C-: CPT

## 2022-12-12 PROCEDURE — 74011250637 HC RX REV CODE- 250/637: Performed by: EMERGENCY MEDICINE

## 2022-12-12 PROCEDURE — 74011636637 HC RX REV CODE- 636/637: Performed by: EMERGENCY MEDICINE

## 2022-12-12 PROCEDURE — 36415 COLL VENOUS BLD VENIPUNCTURE: CPT

## 2022-12-12 PROCEDURE — 74011250636 HC RX REV CODE- 250/636: Performed by: STUDENT IN AN ORGANIZED HEALTH CARE EDUCATION/TRAINING PROGRAM

## 2022-12-12 PROCEDURE — 65660000001 HC RM ICU INTERMED STEPDOWN

## 2022-12-12 PROCEDURE — 83605 ASSAY OF LACTIC ACID: CPT

## 2022-12-12 PROCEDURE — 82803 BLOOD GASES ANY COMBINATION: CPT

## 2022-12-12 PROCEDURE — 74011000250 HC RX REV CODE- 250: Performed by: STUDENT IN AN ORGANIZED HEALTH CARE EDUCATION/TRAINING PROGRAM

## 2022-12-12 PROCEDURE — 93308 TTE F-UP OR LMTD: CPT

## 2022-12-12 PROCEDURE — 74011250636 HC RX REV CODE- 250/636: Performed by: HOSPITALIST

## 2022-12-12 PROCEDURE — 80307 DRUG TEST PRSMV CHEM ANLYZR: CPT

## 2022-12-12 PROCEDURE — 80048 BASIC METABOLIC PNL TOTAL CA: CPT

## 2022-12-12 PROCEDURE — 76604 US EXAM CHEST: CPT

## 2022-12-12 PROCEDURE — 0202U NFCT DS 22 TRGT SARS-COV-2: CPT

## 2022-12-12 PROCEDURE — 74011000250 HC RX REV CODE- 250: Performed by: NURSE PRACTITIONER

## 2022-12-12 PROCEDURE — 94660 CPAP INITIATION&MGMT: CPT

## 2022-12-12 PROCEDURE — 51702 INSERT TEMP BLADDER CATH: CPT

## 2022-12-12 PROCEDURE — 85025 COMPLETE CBC W/AUTO DIFF WBC: CPT

## 2022-12-12 PROCEDURE — 74011000258 HC RX REV CODE- 258: Performed by: STUDENT IN AN ORGANIZED HEALTH CARE EDUCATION/TRAINING PROGRAM

## 2022-12-12 PROCEDURE — 74011250636 HC RX REV CODE- 250/636: Performed by: EMERGENCY MEDICINE

## 2022-12-12 PROCEDURE — 74011000258 HC RX REV CODE- 258: Performed by: HOSPITALIST

## 2022-12-12 PROCEDURE — 74011250637 HC RX REV CODE- 250/637: Performed by: STUDENT IN AN ORGANIZED HEALTH CARE EDUCATION/TRAINING PROGRAM

## 2022-12-12 RX ORDER — ROSUVASTATIN CALCIUM 10 MG/1
20 TABLET, COATED ORAL
Status: DISCONTINUED | OUTPATIENT
Start: 2022-12-12 | End: 2022-12-16 | Stop reason: HOSPADM

## 2022-12-12 RX ORDER — ACETAMINOPHEN 325 MG/1
650 TABLET ORAL
Status: DISCONTINUED | OUTPATIENT
Start: 2022-12-12 | End: 2022-12-16 | Stop reason: HOSPADM

## 2022-12-12 RX ORDER — ACETAMINOPHEN 650 MG/1
650 SUPPOSITORY RECTAL
Status: DISCONTINUED | OUTPATIENT
Start: 2022-12-12 | End: 2022-12-16 | Stop reason: HOSPADM

## 2022-12-12 RX ORDER — TAMSULOSIN HYDROCHLORIDE 0.4 MG/1
0.4 CAPSULE ORAL DAILY
Status: DISCONTINUED | OUTPATIENT
Start: 2022-12-12 | End: 2022-12-15

## 2022-12-12 RX ORDER — SODIUM CHLORIDE 0.9 % (FLUSH) 0.9 %
5-40 SYRINGE (ML) INJECTION AS NEEDED
Status: DISCONTINUED | OUTPATIENT
Start: 2022-12-12 | End: 2022-12-16 | Stop reason: HOSPADM

## 2022-12-12 RX ORDER — ASPIRIN 81 MG/1
81 TABLET ORAL DAILY
Status: DISCONTINUED | OUTPATIENT
Start: 2022-12-12 | End: 2022-12-16 | Stop reason: HOSPADM

## 2022-12-12 RX ORDER — ONDANSETRON 4 MG/1
4 TABLET, ORALLY DISINTEGRATING ORAL
Status: DISCONTINUED | OUTPATIENT
Start: 2022-12-12 | End: 2022-12-16 | Stop reason: HOSPADM

## 2022-12-12 RX ORDER — ENOXAPARIN SODIUM 100 MG/ML
40 INJECTION SUBCUTANEOUS DAILY
Status: DISCONTINUED | OUTPATIENT
Start: 2022-12-12 | End: 2022-12-13

## 2022-12-12 RX ORDER — DIAZEPAM 10 MG/2ML
2.5 INJECTION INTRAMUSCULAR ONCE
Status: COMPLETED | OUTPATIENT
Start: 2022-12-12 | End: 2022-12-12

## 2022-12-12 RX ORDER — POLYETHYLENE GLYCOL 3350 17 G/17G
17 POWDER, FOR SOLUTION ORAL DAILY PRN
Status: DISCONTINUED | OUTPATIENT
Start: 2022-12-12 | End: 2022-12-16 | Stop reason: HOSPADM

## 2022-12-12 RX ORDER — LANOLIN ALCOHOL/MO/W.PET/CERES
3 CREAM (GRAM) TOPICAL
Status: DISCONTINUED | OUTPATIENT
Start: 2022-12-12 | End: 2022-12-16 | Stop reason: HOSPADM

## 2022-12-12 RX ORDER — FUROSEMIDE 10 MG/ML
40 INJECTION INTRAMUSCULAR; INTRAVENOUS
Status: COMPLETED | OUTPATIENT
Start: 2022-12-12 | End: 2022-12-12

## 2022-12-12 RX ORDER — FUROSEMIDE 10 MG/ML
40 INJECTION INTRAMUSCULAR; INTRAVENOUS 2 TIMES DAILY
Status: DISCONTINUED | OUTPATIENT
Start: 2022-12-12 | End: 2022-12-15

## 2022-12-12 RX ORDER — CLOPIDOGREL BISULFATE 75 MG/1
75 TABLET ORAL DAILY
Status: DISCONTINUED | OUTPATIENT
Start: 2022-12-12 | End: 2022-12-16 | Stop reason: HOSPADM

## 2022-12-12 RX ORDER — LORAZEPAM 0.5 MG/1
2 TABLET ORAL
Status: COMPLETED | OUTPATIENT
Start: 2022-12-12 | End: 2022-12-12

## 2022-12-12 RX ORDER — ONDANSETRON 2 MG/ML
4 INJECTION INTRAMUSCULAR; INTRAVENOUS
Status: DISCONTINUED | OUTPATIENT
Start: 2022-12-12 | End: 2022-12-16 | Stop reason: HOSPADM

## 2022-12-12 RX ORDER — SODIUM CHLORIDE 0.9 % (FLUSH) 0.9 %
5-40 SYRINGE (ML) INJECTION EVERY 8 HOURS
Status: DISCONTINUED | OUTPATIENT
Start: 2022-12-12 | End: 2022-12-16 | Stop reason: HOSPADM

## 2022-12-12 RX ORDER — METOPROLOL TARTRATE 5 MG/5ML
2.5 INJECTION INTRAVENOUS ONCE
Status: COMPLETED | OUTPATIENT
Start: 2022-12-12 | End: 2022-12-12

## 2022-12-12 RX ADMIN — Medication 5 ML: at 06:00

## 2022-12-12 RX ADMIN — FUROSEMIDE 40 MG: 10 INJECTION, SOLUTION INTRAVENOUS at 00:39

## 2022-12-12 RX ADMIN — AMIODARONE HYDROCHLORIDE 1 MG/MIN: 50 INJECTION, SOLUTION INTRAVENOUS at 13:57

## 2022-12-12 RX ADMIN — AMIODARONE HYDROCHLORIDE 0.5 MG/MIN: 50 INJECTION, SOLUTION INTRAVENOUS at 21:14

## 2022-12-12 RX ADMIN — Medication 10 UNITS: at 00:41

## 2022-12-12 RX ADMIN — CLOPIDOGREL BISULFATE 75 MG: 75 TABLET ORAL at 08:26

## 2022-12-12 RX ADMIN — ENOXAPARIN SODIUM 40 MG: 100 INJECTION SUBCUTANEOUS at 08:28

## 2022-12-12 RX ADMIN — Medication 10 ML: at 00:39

## 2022-12-12 RX ADMIN — DEXTROSE MONOHYDRATE 150 MG: 50 INJECTION, SOLUTION INTRAVENOUS at 13:28

## 2022-12-12 RX ADMIN — FUROSEMIDE 40 MG: 10 INJECTION, SOLUTION INTRAMUSCULAR; INTRAVENOUS at 14:02

## 2022-12-12 RX ADMIN — METOPROLOL TARTRATE 2.5 MG: 5 INJECTION, SOLUTION INTRAVENOUS at 09:57

## 2022-12-12 RX ADMIN — LORAZEPAM 2 MG: 0.5 TABLET ORAL at 00:51

## 2022-12-12 RX ADMIN — DIAZEPAM 2.5 MG: 5 INJECTION, SOLUTION INTRAMUSCULAR; INTRAVENOUS at 04:41

## 2022-12-12 RX ADMIN — PIPERACILLIN AND TAZOBACTAM 4.5 G: 4; .5 INJECTION, POWDER, FOR SOLUTION INTRAVENOUS at 00:43

## 2022-12-12 RX ADMIN — Medication 10 ML: at 23:44

## 2022-12-12 RX ADMIN — ASPIRIN 81 MG: 81 TABLET, COATED ORAL at 08:26

## 2022-12-12 RX ADMIN — TAMSULOSIN HYDROCHLORIDE 0.4 MG: 0.4 CAPSULE ORAL at 08:26

## 2022-12-12 RX ADMIN — DOXYCYCLINE 100 MG: 100 INJECTION, POWDER, LYOPHILIZED, FOR SOLUTION INTRAVENOUS at 00:53

## 2022-12-12 RX ADMIN — FUROSEMIDE 40 MG: 10 INJECTION, SOLUTION INTRAMUSCULAR; INTRAVENOUS at 18:41

## 2022-12-12 RX ADMIN — SODIUM CHLORIDE 2 G: 9 INJECTION INTRAMUSCULAR; INTRAVENOUS; SUBCUTANEOUS at 03:36

## 2022-12-12 NOTE — CONSULTS
[facility]  Cardiology Care Note                  [x]Initial visit     []Established visit     Patient Name: Caridad Ann - CUL:[id number]  Primary Cardiologist: Derwood Runner, MD  Consulting Cardiologist: Wayne Chavarria MD     Reason for initial visit:  dyspnea, decompensated HF, tachycardia. HPI:     CAD with CABG 6/9/2018. NSTEMI in November 2016 and resolved pulmonary HTN. Stent to circumflex an LAD in 2016. Stress echo in 2018 with a drop in BP with exercise and a drop in EF. Cath on 6/22/18 that showed even with a 5F catheter, he dampened with suspected ostial 3 vessel disease. Echo 3/27/2021 = EF 55 to 60% mild AR MR TR LAE MAC  Nuclear 3/18/2021 EF 64% medium size defect apical and inferior lateral reversible ischemia medium defect mid and inferolateral nonreversible appear to be infarction intermediate risk stress test .  PCI 02/28/2022--patent LIMA to LAD, vein graft to distal RCA with severe disease in the native vessels Occluded vein graft to OM 2. Native OM 2 severely diseased along with proximal to mid circumflex as well as distal left main. Overall the vessel is significantly remodeled negatively. Additionally there is significant disease in the first marginal branch which is even smaller as well as AV groove branch right at the takeoff of OM2. Single stent 2.25 x 28 mm Xience was placed extending from the OM 2 into the circumflex into the distal left main and sequentially dilated with 2.25 noncompliant, 2 5 noncompliant, 3 oh noncompliant and 3 5 noncompliant to perform multilevel POT to manage multiple bifurcations on the way. Atherectomy was considered but given small size of the vessels, was not deemed to be absolutely safe for the obtuse marginal lesion. Overall stent expanded fairly well related to the size of the vessels.  Normal LVEDP  Nuclear stress October 27, 2022 test showed ischemia similar to 3/15/2021 with a medium size defect in the mid to distal inferolateral and anterolateral segments in the circumflex territory he had septal dyskinesia with an EF of 39%. He had a fixed apical infarct  Echocardiogram October 27, 2022 showed an EF of 45 to 50% TAPSE at 1.2 showing reduced RV function sclerosis of the aortic valve with stenosis that was very mild with a valve area of mean of 7 mmHg and HUY of 1.4 cm². The mitral was thickened with restricted mobility and mild MR.      SUBJECTIVE:    Pt discharged home last week from [facility]. Reports medication compliance. States he felt fine until sat he began with dyspnea progressing to limiting cough, dyspnea. Denies chest pain. Currently on Bipap. Assessment and Plan       Dyspnea/decompensated HFrEF- tachycardic,Chest CT with Overall mild increase diffuse interlobular septal thickening and patchy bilateral lung opacities in keeping with pulmonary edema with superimposed infection/inflammation not excluded. Add BB, diurese with IV lasix. Unlikely to be a candidate for entresto given severe hypotensiona nd hyperkalemia. 2  CAD native vessel with unstable angina and NSTEMI  Prior CABG in 2018, cath 9/8/2021 open LIMA, PCI to OM 2 all the way up to the left main 2/28/2022. Cath yesterday 12/6/2022 showed open LIMA and RCA graft some in-stent restenosis in the long graft from the left main to OM 2 but not severe and the OM1 was more severely diseased in the small vessel. Interventional cardiology felt no reasonable vessel to intervene and recommended medical management. The OM disease is consistent with a lateral wall ischemia demonstrated on prior nuclear stress test.  Continue aspirin and Plavix noted mild anemia-hemoglobin on admission (10.8)    3  Cardiomyopathy systolic heart failure  NYHA III with shortness of breath at short distance EF40-45%. Will repeat echo.  Overall WMA on recent echo appears not to be in teritorry of ischemia suggesting alternative mechanism fo cardiomyopathy(stress SMP vs myocarditis)    4. Aortic stenosis mild mean gradient of 7 nn Hg, HUY 1.4 by echo 11/27/2022 -mild , murmur     5. Diabetes mellitus type 2 previously managed by hospitalist service now has a glucose of 377 patient is currently on antihyperglycemic's and will involve diabetes treatment center for assistance  On insulin lispro and alogliptin    6  Hypertension previously hypertensive now with low blood pressure  Will review meds and consider changes based on optimal medical therapy for congestive heart failure    7. CKD 3-4-stable (cr 1.63)        8 XOL  Statin intolerant-improved. Interesting that he had such progression of coronary disease despite being relatively low and weight not sedentary and having to low LDL on a high potency statin. If his LDL was higher I consider adding a PCSK9 but I cannot really recommend that given his LDL 41  At goal , denies excess muscle aches or new liver issues      Plan to continue IV diuretics as tolerated in conjunction with BIPAP for now.           ____________________________________________________________    Cardiac testing  10/27/22    ECHO ADULT COMPLETE 10/27/2022 10/27/2022    Interpretation Summary    Left Ventricle: Mildly reduced left ventricular systolic function with a visually estimated EF of 45 - 50%. Left ventricle size is normal. Normal wall thickness. Normal wall motion. Abnormal diastolic function. Right Ventricle: Reduced systolic function. TAPSE is 1.2 cm. Aortic Valve: Valve structure is normal. Mild sclerosis of the aortic valve cusp. Mild annular calcification. Mild regurgitation. Mild stenosis of the aortic valve. AV mean gradient is 7 mmHg. AV peak gradient is 13 mmHg. LVOT:AV VTI Index is 0.55. AV area by peak velocity is 1.4 cm2. Mitral Valve: Moderately thickened leaflet. Mild annular calcification of the mitral valve.  The posterior leaflet of the mitral valve has restricted mobility. Mild regurgitation. Left Atrium: Left atrium is dilated. Signed by: Sabine Stovall MD on 10/27/2022  4:49 PM        10/27/22    NUCLEAR CARDIAC STRESS TEST 10/27/2022 11/1/2022    Interpretation Summary    Nuclear Findings: The study is positive for myocardial ischemia. The defect appears to be ischemia. The areas of ischemia are similar to 3/15/2021 study. Nuclear Findings: There is a moderate severity left ventricular stress perfusion defect that is medium in size present in the mid to distal inferolateral and anterolateral segment(s) that is reversible. The defect is consistent with abnormal perfusion in the LCx territory. There is normal wall motion in the defect area. The defect appears to be probable ischemia. There is a moderate severity second left ventricular stress perfusion defect. The defect appears to be infarction. Nuclear Findings: Abnormal left ventricular systolic function post-stress. Septal dyskinesis. Post-stress ejection fraction is 39%. ECG: Resting ECG demonstrates normal sinus rhythm. ECG: Stress ECG was negative for ischemia. Stress Test: A pharmacological stress test was performed using lexiscan. Blood pressure demonstrated a normal response and heart rate demonstrated a normal response to stress. The patient's heart rate recovery was normal. The patient reported no symptoms during the stress test.    Signed by: Sabine Stovall MD on 10/27/2022  4:45 PM    02/28/22    CARDIAC PROCEDURE 02/28/2022 2/28/2022    Conclusion  Findings  1. Severe native multivessel coronary disease  2. Patent LIMA to LAD, vein graft to distal RCA with severe disease in the native vessels  3. Occluded vein graft to OM 2. Native OM 2 severely diseased along with proximal to mid circumflex as well as distal left main. Overall the vessel is significantly remodeled negatively.   Additionally there is significant disease in the first marginal branch which is even smaller as well as AV groove branch right at the takeoff of OM2. Single stent 2.25 x 28 mm Xience was placed extending from the OM 2 into the circumflex into the distal left main and sequentially dilated with 2.25 noncompliant, 2 5 noncompliant, 3 oh noncompliant and 3 5 noncompliant to perform multilevel POT to manage multiple bifurcations on the way. Atherectomy was considered but given small size of the vessels, was not deemed to be absolutely safe for the obtuse marginal lesion. Overall stent expanded fairly well related to the size of the vessels. 4.  Normal LVEDP    Access right radial: Small vessel, tortuous} brachiocephalic  Contrast 65 cc    Recommendations  1. Plavix based dual antiplatelet therapy  2. Guideline directed medical therapy for secondary prevention of coronary events    Signed by: Jessica Baeza MD on 2/28/2022  4:22 PM        Most recent HS troponins:  Recent Labs     12/11/22  2308   TROPHS 1,559*       Review of Systems    [x]All other systems reviewed and all negative except as written in HPI    [] Patient unable to provide secondary to condition         Past Medical History:   Diagnosis Date    CAD (coronary artery disease) 11/10/2016    NSTEMI & 2 stents    Deafness 10/28/2012    DM (diabetes mellitus) ([facility] [address]     Elevated cholesterol     Hypertension     NSTEMI (non-ST elevated myocardial infarction) ([facility] [address] 11/10/2016     Past Surgical History:   Procedure Laterality Date    COLONOSCOPY N/A 6/28/2018    [facility] [address]  [facility]11[facility]2016    2 stents     Social Hx:  reports that he has never smoked. He has never been exposed to tobacco smoke. He has never used smokeless tobacco. He reports current alcohol use of about 2.0 standard drinks per week. He reports that he does not use drugs.   Family Hx: family history includes Elevated Lipids in his brother, brother, and brother; Heart Attack in his father; Heart Disease in his father; Hypertension in his mother; No Known Problems in his daughter, sister, and son. No Known Allergies       OBJECTIVE:  Wt Readings from Last 3 Encounters:   12/11/22 135 lb (61.2 kg)   12/05/22 130 lb (59 kg)   12/05/22 130 lb 3.2 oz (59.1 kg)       Intake/Output Summary (Last 24 hours) at 12/13/2022 1648  Last data filed at 12/13/2022 1600  Gross per 24 hour   Intake 526.66 ml   Output 3510 ml   Net -2983.34 ml           Physical Exam    Vitals:   Vitals:    12/13/22 1400 12/13/22 1409 12/13/22 1412 12/13/22 1414   BP:  (!) 104/53 (!) 107/48 (!) 116/45   Pulse: (!) 113 (!) 111 (!) 112 (!) 117   Resp:       Temp:       SpO2:  100% 100% 100%   Weight:       Height:         Telemetry: normal sinus rhythm    BP (!) 116/45 (BP Patient Position: Standing)   Pulse (!) 117   Temp 97.8 °F (36.6 °C)   Resp 25   Ht 5' 9\" (1.753 m)   Wt 135 lb (61.2 kg)   SpO2 100%   BMI 19.94 kg/m²   General:    Alert, cooperative, no distress. Psychiatric:    Normal Mood and affect    Eye/ENT:      Pupils equal, No asymmetry, Conjunctival pink. Able to hear voice at normal amplitude   Lungs:      Visibly symmetric chest expansion, No palpable tenderness. significant bibasilar crackles. Heart[de-identified]    Regular rate and rhythm, S1, S2 normal, no murmur, click, rub or gallop. No JVD, Normal palpable peripheral pulses. No cyanosis   Abdomen:     Soft, non-tender. Bowel sounds normal. No masses,  No      organomegaly. Extremities:   Extremities normal, atraumatic, no edema. Neurologic:   CN II-XII grossly intact.  No gross focal deficits           Data Review:     Radiology:   XR Results (most recent):  Results from Hospital Encounter encounter on 12/11/22    XR CHEST PORT    Narrative  EXAM:  XR CHEST PORT    INDICATION: Chest pain and shortness of breath    COMPARISON: 12/5/2022    TECHNIQUE: Portable AP upright chest view at 2315 hours    FINDINGS: The cardiomediastinal contours are stable. There are increased diffuse interstitial and patchy airspace opacities. There  are small pleural effusions. There is no pneumothorax. The bones and upper  abdomen are stable. Impression  Increased diffuse interstitial and patchy airspace opacities can be seen with  pulmonary edema or infection. Small pleural effusions. CT Results (most recent):  Results from Hospital Encounter encounter on 12/11/22    CT CHEST WO CONT    Narrative  EXAM:  CT CHEST WO CONT    INDICATION:  Shortness of breath    COMPARISON: Radiograph 12/11/2022. CT 12/5/2022. TECHNIQUE: Helical CT of the chest is performed without intravenous contrast.  Coronal and sagittal reformatted images are obtained. CT dose reduction was  achieved through use of a standardized protocol tailored for this examination  and automatic exposure control for dose modulation. Adaptive statistical  iterative reconstruction (ASIR) was utilized. FINDINGS:  The visualized thyroid gland is unremarkable. The aorta and main pulmonary  artery are stable in caliber. CABG surgical changes are noted. The heart size is  stable. There is no pericardial effusion. There are no enlarged axillary, mediastinal, or hilar lymph nodes. There is slightly increased small to moderate pleural effusions. Diffuse  interlobular septal thickening with patchy bilateral lung opacities is mildly  increased since 12/5/2022. There is cholelithiasis. The right adrenal nodule is unchanged. There is no  acute abnormality in the visualized upper abdomen. There are degenerative  changes in the spine. There is no aggressive bony lesion. Impression  1. Slightly increased small to moderate pleural effusions. 2. Overall mild increase diffuse interlobular septal thickening and patchy  bilateral lung opacities in keeping with pulmonary edema with superimposed  infection/inflammation not excluded. Follow up to resolution is suggested.     MRI Results (most recent):  Results from [place] encounter on 05/22/16    MRI LUMB SPINE WO CONT    Narrative  **Final Report**      ICD Codes / Adm. Diagnosis: 724.2  M54.5 / Lumbago  Low back pain  Examination:  MR David Zhou CON  - [id number] - May 22 2016  1:45PM  Accession No:  [id number]  Reason:  Low back pain      REPORT:  Indication: Pain and back extends into right leg to foot    Exam: MRI of the lumbar spine. Sequences include sagittal and axial T1 and  T2-weighted images. Sagittal STIR. Comparisons: April 27, 2016    Contrast: None    Findings: Alignment is normal. There is no evidence of marrow replacement or  acute fracture. Cord terminus is within normal limits. Paraspinous soft  tissues are within normal limits. T12-L1: No stenosis    L1-L2: There is desiccation of this disc with a small bulge but no stenosis    L2-L3: There is desiccation of this disc with a small bulge but no stenosis    L3-L4: There is mild left facet arthropathy but no stenosis    L4-L5: There is disc height loss with a small disc bulge. Facet arthropathy. No stenosis    L5-S1: There is disc height loss with a small disc bulge and left  paracentral disc extrusion extending inferiorly. This mildly distorts the  left S1 nerve root in the subarticular zone and left lateral recess. There  are facet degenerative changes with moderate left and mild-to-moderate right  foraminal narrowing    Impression  :  1. Multilevel degenerative change detailed by level above most significant  at L5-S1                Signing/Reading Doctor: Dmitriy Patel ([id number])  Approved: Dmitriy Patel ([id number])  May 23 2016  8:39AM        No results for input(s): CPK, TROIQ in the last 72 hours.     No lab exists for component: CKQMB, CPKMB, BMPP  Recent Labs     12/13/22  1549 12/13/22  0303 12/12/22  0333   NA  --  139 136   K 4.8 5.9* 5.2*   CL  --  109* 110*   CO2  --  21 19*   BUN  --  40* 32*   CREA  --  2.11* 1.86*   GLU  --  200* 235*   CA  -- 9.1 8.5     Recent Labs     12/13/22  0303 12/12/22  1840   WBC 7.9 9.3   HGB 10.2* 8.3*   HCT 34.3* 28.9*    257     Recent Labs     12/11/22  2308   AP 85     No results for input(s): CHOL, LDLC in the last 72 hours. No lab exists for component: TGL, HDLC,  HBA1C  No results for input(s): CRP, TSH, TSHEXT, TSHEXT in the last 72 hours.     No lab exists for component: ESR          Current meds:    Current Facility-Administered Medications:     [START ON 12/14/2022] enoxaparin (LOVENOX) injection 30 mg, 30 mg, SubCUTAneous, DAILY, Melani Torres MD    sodium chloride (NS) flush 5-40 mL, 5-40 mL, IntraVENous, Q8H, Heber Barajas MD, 10 mL at 12/13/22 1400    sodium chloride (NS) flush 5-40 mL, 5-40 mL, IntraVENous, PRN, Heber Barajas MD    acetaminophen (TYLENOL) tablet 650 mg, 650 mg, Oral, Q6H PRN **OR** acetaminophen (TYLENOL) suppository 650 mg, 650 mg, Rectal, Q6H PRN, Heber Barajas MD    polyethylene glycol (MIRALAX) packet 17 g, 17 g, Oral, DAILY PRN, Heber Barajas MD    ondansetron (ZOFRAN ODT) tablet 4 mg, 4 mg, Oral, Q8H PRN **OR** ondansetron (ZOFRAN) injection 4 mg, 4 mg, IntraVENous, Q6H PRN, Heber Barajas MD    furosemide (LASIX) injection 40 mg, 40 mg, IntraVENous, BID, Heber Barajas MD, 40 mg at 12/13/22 0827    aspirin delayed-release tablet 81 mg, 81 mg, Oral, DAILY, Heber Barajas MD, 81 mg at 12/13/22 0827    clopidogreL (PLAVIX) tablet 75 mg, 75 mg, Oral, DAILY, Heber Barajas MD, 75 mg at 12/13/22 0827    rosuvastatin (CRESTOR) tablet 20 mg, 20 mg, Oral, QHS, Heber Barajas MD    tamsulosin (FLOMAX) capsule 0.4 mg, 0.4 mg, Oral, DAILY, Heber Barajas MD, 0.4 mg at 12/13/22 0827    melatonin tablet 3 mg, 3 mg, Oral, QHS PRN, JACOB Hernandez MD  [facility] [address] [address]  [facility]  [phone]      Gwen Vasquez MD

## 2022-12-12 NOTE — PROGRESS NOTES
Occupational Therapy   12.12.2022    Orders acknowledged and chart reviewed in prep for OT evaluation. Noted patient troponin currently 1,559 (unsure if at peak at this time). Also of note, patient tachycardic at rest with most recent HR at 132. Will defer and follow-up as able and medically appropriate. Thank you. Mercedes Rayo, MS, OTR/L

## 2022-12-12 NOTE — PROGRESS NOTES
Physical Therapy - hold  Order received, chart reviewed in prep for PT evaluation. Noted troponin 1559, BP 71/44 and currently on BiPAP. Will defer and follow for therapy intervention as medically appropriate. Thank you.

## 2022-12-12 NOTE — PROGRESS NOTES
Care Management -     11:10 AM Received call from Katlin Quintanilla Care Transition Nurse. She said she has made him an appointment with pulmonary - Dr. Ki Gomes on 12-19-22 at 2:45. He will need to arrive 15 minutes early. He will need a PET scan prior to his appointment so pulmonary can discuss it with him. Per his wife the pharmacy did not have his Entresto on Friday and was going to order the medication. She told him to continue the Indur until [facility] was picked up.      CODI Andrews

## 2022-12-12 NOTE — H&P
History & Physical    Primary Care Provider: Kita Dalal MD  Source of Information: Patient and chart review    History of Presenting Illness:   Marylen Coles is a 71 y.o. male with hx of cad s/p cabg, ischemic cardiomyopathy, hfref, htn, ckd iii, pvd, dm ii who presents to hospital with complaints of sob and chest tightness. Had admission from December 5th to 8th for cad and diagnostic left heart cath. Since discharge, reports progressive dyspnea with minimal exertion, chest tightness and orthopnea. Trail of sublingual nitro prior to ed presentation today offered no relief. The patient denies any fever, chills, chest or abdominal pain, nausea, vomiting, cough, congestion, recent illness, palpitations, or dysuria. Remarkable vitals on ER Presentations: hr to 130s, rr to 30s  Labs Remarkable for: k-6, cr 1.63, glu 295, bnp 2384  ER Images: cxr:    Increased diffuse interstitial and patchy airspace opacities can be seen with  pulmonary edema or infection. Small pleural effusions. ER Rx: 1l ns bolus, zosyn_levaquin, lasix 40mg     Review of Systems:  Pertinent items are noted in the History of Present Illness. Past Medical History:   Diagnosis Date    CAD (coronary artery disease) 11/10/2016    NSTEMI & 2 stents    Deafness 10/28/2012    DM (diabetes mellitus) ([address]     Elevated cholesterol     Hypertension     NSTEMI (non-ST elevated myocardial infarction) ([address] 11/10/2016      Past Surgical History:   Procedure Laterality Date    COLONOSCOPY N/A 6/28/2018    COLONOSCOPY performed by Gaby Kamara MD at [address] [place] [address] St  11/11/2016    2 stents     Prior to Admission medications    Medication Sig Start Date End Date Taking? Authorizing Provider   sacubitriL-valsartan [facility] Champion) 24-26 mg tablet Take 1 Tablet by mouth two (2) times a day.  12/8/22   Kaylan Segal MD   dapagliflozin (FARXIGA) 10 mg tab tablet Take 1 Tablet by mouth daily. 12/8/22   Radha Sorenson MD   glipiZIDE (GLUCOTROL) 5 mg tablet Take 1 tablet by mouth twice daily 12/2/22   Cherry Adams MD   metFORMIN (GLUCOPHAGE) 1,000 mg tablet TAKE 1 TABLET BY MOUTH TWICE DAILY WITH MEALS 12/2/22   Cherry Adams MD   tamsulosin [facility]) 0.4 mg capsule Take 1 capsule by mouth once daily 11/22/22   Cherry Adams MD   ipratropium (ATROVENT) 21 mcg (0.03 %) nasal spray 2 Sprays by Both Nostrils route every twelve (12) hours. 11/21/22   Cherry Adams MD   nitroglycerin (NITROSTAT) 0.4 mg SL tablet 1 Tablet by SubLINGual route every five (5) minutes as needed for Chest Pain. Up to 3 doses. 11/16/22   Radha Sorenson MD   Jardiance 25 mg tablet TAKE 1 TABLET BY MOUTH IN THE MORNING 10/31/22   Cherry Adams MD   ergocalciferol (ERGOCALCIFEROL) 1,250 mcg (50,000 unit) capsule Take 1 Capsule by mouth every seven (7) days. 10/14/22   Cherry Adams MD   Januvia 50 mg tablet Take 1 tablet by mouth once daily 9/23/22   Cherry Adams MD   polyethylene glycol (MIRALAX) 17 gram/dose powder Take 17 g by mouth daily. 5/31/22   Cherry Adams MD   albendazole (ALBENZA) 200 mg tablet TAKE 2 TABLETS BY MOUTH ONCE FOR 1 DOSE. THEN REPEAT IN ONE WEEK  Patient not taking: Reported on 12/5/2022 5/26/22   Cherry Adams MD   clopidogreL (Plavix) 75 mg tab Take 1 Tablet by mouth daily for 360 days. Indications: a sudden worsening of angina called acute coronary syndrome 2/28/22 2/23/23  Rupinder Swan MD   rosuvastatin (CRESTOR) 20 mg tablet Take 1 Tablet by mouth nightly. 2/28/22   Rupinder Swan MD   aspirin delayed-release 81 mg tablet Take 1 Tab by mouth.     Provider, Historical     No Known Allergies   Family History   Problem Relation Age of Onset    Heart Disease Father     Heart Attack Father     Hypertension Mother     Elevated Lipids Brother     Elevated Lipids Brother     No Known Problems Sister     Elevated Lipids Brother     No Known Problems Son     No Known Problems Daughter     Anesth Problems Neg Hx         SOCIAL HISTORY:  Patient resides:  Independently x   Assisted Living    SNF    With family care       Smoking history:   None x   Former    Chronic      Alcohol history:   None x   Social    Chronic      Ambulates:   Independently x   w/cane    w/walker    w/wc    CODE STATUS:  DNR    Full x   Other      Objective:     Physical Exam:     Visit Vitals  /68   Pulse (!) 136   Temp 97.8 °F (36.6 °C)   Resp 23   Ht 5' 9\" (1.753 m)   Wt 61.2 kg (135 lb)   SpO2 100%   BMI 19.94 kg/m²    O2 Flow Rate (L/min): 2 l/min (placed on 2L NC d/t chest pain / persistent tachycardiac - no hypoxia noted;;) O2 Device: Nasal cannula    General:  Alert, cooperative, no distress, appears stated age. Head:  Normocephalic, without obvious abnormality, atraumatic. Eyes:  Conjunctivae/corneas clear. PERRL, EOMs intact. Nose: Nares normal. Septum midline. Mucosa normal.        Neck: Supple, symmetrical, trachea midline, no carotid bruit and no JVD. Lungs:   Clear to auscultation bilaterally. Chest wall:  No tenderness or deformity. Heart:  Regular rate and rhythm, S1, S2 normal, no murmur, click, rub or gallop. Abdomen:   Soft, non-tender. Bowel sounds normal. No masses,  No organomegaly. Extremities: Extremities normal, atraumatic, no cyanosis or edema. Pulses: 2+ and symmetric all extremities. Skin: Skin color, texture, turgor normal. No rashes or lesions   Neurologic: CNII-XII grossly intact.           EKG:  sinus tach  Data Review:     Recent Days:  Recent Labs     12/11/22  2308   WBC 7.9   HGB 10.8*   HCT 36.1*        Recent Labs     12/11/22  2308   *   K 6.0*      CO2 20*   *   BUN 30*   CREA 1.63*   CA 9.0   MG 2.3   ALB 3.6   ALT 19     Recent Labs     12/11/22  2307   HCO3 19       24 Hour Results:  Recent Results (from the past 24 hour(s))   EKG, 12 LEAD, INITIAL    Collection Time: 12/11/22 10:48 PM   Result Value Ref Range    Ventricular Rate 133 BPM    Atrial Rate 133 BPM    P-R Interval 134 ms    QRS Duration 82 ms    Q-T Interval 304 ms    QTC Calculation (Bezet) 452 ms    Calculated P Axis 66 degrees    Calculated R Axis 85 degrees    Calculated T Axis -146 degrees    Diagnosis       Sinus tachycardia  Possible Inferior infarct , age undetermined  Marked ST abnormality, possible lateral subendocardial injury  Abnormal ECG  When compared with ECG of 05-DEC-2022 16:11,  ST less depressed in Lateral leads     BLOOD GAS,CHEM8,LACTIC ACID POC    Collection Time: 12/11/22 11:07 PM   Result Value Ref Range    Calcium, ionized (POC) 1.22 1.12 - 1.32 mmol/L    BICARBONATE 19 mmol/L    Base deficit (POC) 5.4 mmol/L    Sample source VENOUS BLOOD      CO2, POC 19 19 - 24 MMOL/L    Sodium,  (L) 136 - 145 MMOL/L    Potassium, POC 6.2 (HH) 3.5 - 5.5 MMOL/L    Chloride,  (H) 100 - 108 MMOL/L    Glucose,  (H) 74 - 106 MG/DL    Creatinine, POC 1.4 (H) 0.6 - 1.3 MG/DL    Lactic Acid (POC) 2.38 (HH) 0.40 - 2.00 mmol/L    pH, venous (POC) 7.36 7.32 - 7.42      pCO2, venous (POC) 34.4 (L) 41 - 51 MMHG    pO2, venous (POC) 27 25 - 40 mmHg   TROPONIN-HIGH SENSITIVITY    Collection Time: 12/11/22 11:08 PM   Result Value Ref Range    Troponin-High Sensitivity 1,559 (HH) 0 - 76 ng/L   SAMPLES BEING HELD    Collection Time: 12/11/22 11:08 PM   Result Value Ref Range    SAMPLES BEING HELD 1red 1blu     COMMENT        Add-on orders for these samples will be processed based on acceptable specimen integrity and analyte stability, which may vary by analyte.    CBC WITH AUTOMATED DIFF    Collection Time: 12/11/22 11:08 PM   Result Value Ref Range    WBC 7.9 4.1 - 11.1 K/uL    RBC 4.22 4.10 - 5.70 M/uL    HGB 10.8 (L) 12.1 - 17.0 g/dL    HCT 36.1 (L) 36.6 - 50.3 %    MCV 85.5 80.0 - 99.0 FL    MCH 25.6 (L) 26.0 - 34.0 PG    MCHC 29.9 (L) 30.0 - 36.5 g/dL    RDW 21.2 (H) 11.5 - 14.5 %    PLATELET 515 412 - 407 K/uL    MPV 11.2 8.9 - 12.9 FL    NRBC 0.0 0  WBC    ABSOLUTE NRBC 0.00 0.00 - 0.01 K/uL    NEUTROPHILS 80 (H) 32 - 75 %    LYMPHOCYTES 10 (L) 12 - 49 %    MONOCYTES 8 5 - 13 %    EOSINOPHILS 1 0 - 7 %    BASOPHILS 1 0 - 1 %    IMMATURE GRANULOCYTES 0 0.0 - 0.5 %    ABS. NEUTROPHILS 6.3 1.8 - 8.0 K/UL    ABS. LYMPHOCYTES 0.8 0.8 - 3.5 K/UL    ABS. MONOCYTES 0.6 0.0 - 1.0 K/UL    ABS. EOSINOPHILS 0.1 0.0 - 0.4 K/UL    ABS. BASOPHILS 0.1 0.0 - 0.1 K/UL    ABS. IMM. GRANS. 0.0 0.00 - 0.04 K/UL    DF SMEAR SCANNED      PLATELET COMMENTS Large Platelets      RBC COMMENTS ANISOCYTOSIS  2+        RBC COMMENTS OVALOCYTES  PRESENT       METABOLIC PANEL, COMPREHENSIVE    Collection Time: 12/11/22 11:08 PM   Result Value Ref Range    Sodium 134 (L) 136 - 145 mmol/L    Potassium 6.0 (H) 3.5 - 5.1 mmol/L    Chloride 107 97 - 108 mmol/L    CO2 20 (L) 21 - 32 mmol/L    Anion gap 7 5 - 15 mmol/L    Glucose 295 (H) 65 - 100 mg/dL    BUN 30 (H) 6 - 20 MG/DL    Creatinine 1.63 (H) 0.70 - 1.30 MG/DL    BUN/Creatinine ratio 18 12 - 20      eGFR 45 (L) >60 ml/min/1.73m2    Calcium 9.0 8.5 - 10.1 MG/DL    Bilirubin, total 0.5 0.2 - 1.0 MG/DL    ALT (SGPT) 19 12 - 78 U/L    AST (SGOT) 11 (L) 15 - 37 U/L    Alk. phosphatase 85 45 - 117 U/L    Protein, total 7.9 6.4 - 8.2 g/dL    Albumin 3.6 3.5 - 5.0 g/dL    Globulin 4.3 (H) 2.0 - 4.0 g/dL    A-G Ratio 0.8 (L) 1.1 - 2.2     NT-PRO BNP    Collection Time: 12/11/22 11:08 PM   Result Value Ref Range    NT pro-BNP 2,384 (H) <125 PG/ML   MAGNESIUM    Collection Time: 12/11/22 11:08 PM   Result Value Ref Range    Magnesium 2.3 1.6 - 2.4 mg/dL   PROCALCITONIN    Collection Time: 12/11/22 11:08 PM   Result Value Ref Range    Procalcitonin 0.05 ng/mL         Imaging:     Assessment:     Gayathri Kyle is a 71 y.o. male with hx of cad, ischemic cardiomyopathy, hfref, htn, ckd iii, pvd, dm ii who is admitted for decompensated hfref.        Plan:       Decompensated HFrEF  -continue diuresis w/ lasix 40mg iv bid  -bipap as needed  -strict I&Os w/ daily weights  -monitor and replete lytes  -cardiology consult in am    Concern for PNA  -likely pum el ao n ct  -procal and rvp normal  -hold further abx    CAD / Ischemic Cardiomyopathy / PVD / Elevated Trop  -chronically elevated trop  -3 vessel dz on recent cath with hx of previous cabg  -continue medical management    CKD III  -stable.  Monitor with daily bmp    BPH  -home flomax          FEN/GI -  npo  Activity - as tolerated  DVT prophylaxis - lovenox  GI prophylaxis -  ni  Disposition - home    CODE STATUS:  full code       Signed By: Maik Smith MD     December 12, 2022

## 2022-12-12 NOTE — ED NOTES
Verbal shift change report given to Stephanie Meehan RN (oncoming nurse) by Melly Lawrence RN (offgoing nurse). Report included the following information SBAR, ED Summary, Intake/Output, MAR and Recent Results.

## 2022-12-12 NOTE — NURSE NAVIGATOR
HEART FAILURE NURSE NAVIGATOR NOTE  [address]    Patient chart was reviewed by Heart Failure (HF) Nurse Navigators for compliance of prescribed treatment with guidelines directed medical therapy (GDMT) and HF database completed. Please, review beneath recommendations for symptomatic patients with HF with Reduced Ejection Fraction ? 40% (HFrEF)* and patients whose LVEF improved > 40% (HFimpEF)* for your consideration when taking care of this patient. Current Medical Therapy:    Name Sonal Kendall    1950   LVEF 35/40%   NYHA Functional Class III   ARNi/ACEi/ARB Holding entresto    Beta-blocker Need documentation   Aldosterone Antagonist Need documentation   SGLT2 inhibitor Farxiga   Hydralazine/Isosorbide Dinitrate    Consulting Cardiologist: [facility]     Recommendations:    Please, add the following GDMT for HFrEF ? 40% [Class 1] or document in discharge summary/progress note why patient cannot take the medication:  ARNi/ACEi or ARB  Beta-blockers (carvedilol, sustained-release metoprolol succinate or bisoprolol)  Aldosterone antagonists GFR > 30 and K< 5  SGLT2 inhibitor  Hydralazine/Isosorbide dinitrate for  Americans with Class III/IV symptoms  Adjust diuretic dose at discharge if hospitalized for volume overload    Consider adding the following GDMT for HFrEF ? 40%, if appropriate [Class 2b]:   Ivabradine for patients on maximally tolerated beta-blocker dose in order to achieve HR 70-80bpm  Digoxin, goal level 0.5-0.9  Polyunsaturated fatty acids  Vericuguat  For patient with hyperkalemia while on RAASi > 5.5, consider adding potassium binders (patiromer, sodium zirconium cycosilicate)    Note: the following medications may be potentially harmful in heart failure [Class 3]:  Calcium channel blockers (doxazosin, diltiazem, verapamil, nifedipine)  Antiarrhythmics (flecanide, disopyrimide, sotalol, dronedarone)  Diabetes medications (thiasolidinediones, saxagliptin, alogliptin)  NSAIDs and CONTRERAS 2 inhibitors    Consider vaccinations for respiratory illnesses (flu, pneumonia, covid) [Class 2b]    For eligible patients with LVEF < 35% consider discharge with wearable defibrillation [Class 2b] and/or referral for ICD implantation [Class 1] for prevention of sudden cardiac death. Due to severely reduced LVEF < 25% and/or recurrent hospitalizations this patient may benefit from referral to Advanced Heart Failure Program to assess suitability for advanced therapies, such as left-ventricular assist device, heart transplantation, palliative inotropes or palliation [Class 1]. To obtain in-patient consultation or refer to [address] [place] [address], call [phone]    Patient Education:     Teach back in heart failure education provided, including information about medical therapy, lifestyle modifications, diet and fluid restrictions, physical activity. Educational resources provided: Living with Heart Failure booklet; Signs/Symptoms magnet; Weight Calendar; [facility] flyer; Preparation for Successful Discharge Checklist.  Information provided about HF support group. Heart failure avoiding triggers on discharge instructions. Plan for Transitional Care:    Post discharge follow up phone call to be made within 48-72 hours of discharge. Patient will follow-up with PCP. Patient will follow-up with Primary Cardiologist.  Obstructive sleep apnea screening done and patient was referred to Sleep Medicine. Referral/follow-up with Cardiac Rehabilitation. Referral/follow-up with Advanced Heart Failure Specialist.  Referral/follow-up with Palliative Care Specialist.      Heart Failure Nurse Navigator  Phone: [phone]  /  [phone]    *Recommendations listed above are based on 2022 AHA/ACC/HFSA Guideline for the Management of Heart Failure:  A Report of the [address] on Clinical Practice Guidelines. Circulation 2022; [id number]. and 2017 AHA/ACC/HRS guideline for management of patients with ventricular arrhythmias and the prevention of sudden cardiac death: A Report of the [employer] of Cardiology/American Heart Association Task Force on Clinical Practice Guidelines and the Heart Rhythm Society.  Heart Rhythm, Vol 15, No 10, October 2018 *Class of Recommendation: Class 1 (strong), Class 2a (moderate), Class 2b (weak), Class 3 (not recommended, potentially harmful)

## 2022-12-12 NOTE — ED NOTES
1355- RT called for ABG    1530- Pt requested ginger ale, Bipap removed briefly for sips of ginger ale.  No acute distress noted

## 2022-12-12 NOTE — PROGRESS NOTES
[address] [facility] Adult  Hospitalist Group                                                                                          Hospitalist Progress Note  Jeri Kraft MD  Answering service: [phone] from in house phone        Date of Service:  2022  NAME:  Rock Galvin  :  1950  MRN:  [id number]      Admission Summary:   Rock Galvin is a 71 y.o. male with hx of cad s/p cabg, ischemic cardiomyopathy, hfref, htn, ckd iii, pvd, dm ii who presents to hospital with complaints of sob and chest tightness. Had admission from  to  for cad and diagnostic left heart cath. Since discharge, reports progressive dyspnea with minimal exertion, chest tightness and orthopnea. Trail of sublingual nitro prior to ed presentation today offered no relief. The patient denies any fever, chills, chest or abdominal pain, nausea, vomiting, cough, congestion, recent illness, palpitations, or dysuria. Interval history / Subjective:   Plan discussed with cardiology heart rate was elevated patient was short of breath and was on BiPAP at the time of exam requested ABG     Assessment & Plan:     Acute hypoxic respiratory failure on BiPAP   decompensated HFrEF last EF 35 to 40% aortic stenosis  -continue diuresis w/ lasix 40mg iv bid  -strict I&Os w/ daily weights  -monitor and replete lytes  -cardiology consult in am  -Wean of BiPAP as tolerates  --Hold Entresto     Acute hypoxic respiratory failure concern for pneumonia  -likely pum edema causing respiratory failure  -procal and rvp normal  -hold further abx     CAD native vessel with unstable angina and NSTEMI  Prior CABG in 2018, cath 2021 open LIMA, PCI to OM 2 all the way up to the left main 2022. Cath  2022 showed open LIMA and RCA graft some in-stent restenosis in the long graft from the left main to OM 2 but not severe and the OM1 was more severely diseased in the small vessel.   Continue aspirin and Plavix   Event for revascularization versus goal-directed medical therapy per cardiology  Patient was tachycardic unable to tolerate beta-blocker last admission started on amiodarone bolus followed by amnio drip to control heart rate    BPH continue Flomax       CKD III  -stable. Monitor with daily bmp    Critically ill on BiPAP wean off as tolerates     Code status: Full code  DVT prophylaxis: Lovenox    Care Plan discussed with: Patient/Family and Nurse  Anticipated Disposition: Home w/Family  Anticipated Discharge: 24 hours to 48 hours    CRITICAL CARE ATTESTATION:  I had a face to face encounter with the patient, reviewed and interpreted patient data including clinical events, labs, images, vital signs, I/O's, and examined patient. I have discussed the case and the plan and management of the patient's care with the consulting services, the bedside nurses and necessary ancillary providers. NOTE OF PERSONAL INVOLVEMENT IN CARE   This patient has a high probability of imminent, clinically significant deterioration, which requires the highest level of preparedness to intervene urgently. I participated in the decision-making and personally managed or directed the management of the following life and organ supporting interventions that required my frequent assessment to treat or prevent imminent deterioration. I personally spent 45 minutes of critical care time. This is time spent at this critically ill patient's bedside actively involved in patient care as well as the coordination of care and discussions with the patient's family. This does not include any procedural time which has been billed separately. Hospital Problems  Date Reviewed: 12/5/2022            Codes Class Noted POA    CHF (congestive heart failure) ([facility] [address] ICD-10-CM: I50.9  ICD-9-CM: 428.0  12/12/2022 Unknown             Review of Systems:   A comprehensive review of systems was negative.        Vital Signs:    Last 24hrs VS reviewed since prior progress note. Most recent are:  Visit Vitals  /68 (BP 1 Location: Left arm, BP Patient Position: At rest)   Pulse (!) 120   Temp 97 °F (36.1 °C)   Resp 24   Ht 5' 9\" (1.753 m)   Wt 61.2 kg (135 lb)   SpO2 100%   BMI 19.94 kg/m²       No intake or output data in the 24 hours ending 12/12/22 1402     Physical Examination:     I had a face to face encounter with this patient and independently examined them on 12/12/2022 as outlined below:          General : alert x 3, awake, no acute distress, resting in bed, pleasant   HEENT: PEERL, EOMI, moist mucus membrane, TM clear  Neck: supple, no JVD, no meningeal signs  Chest: Clear to auscultation bilaterally   CVS: S1 S2 heard, Capillary refill less than 2 seconds  Abd: soft/ Non tender, non distended, BS physiological,   Ext: no clubbing, no cyanosis, no edema, brisk 2+ DP pulses  Neuro/Psych: pleasant mood and affect, CN 2-12 grossly intacT  Skin: warm     Data Review:    I personally reviewed  Image and LABS      Labs:     Recent Labs     12/11/22 2308   WBC 7.9   HGB 10.8*   HCT 36.1*        Recent Labs     12/12/22  0333 12/11/22 2308    134*   K 5.2* 6.0*   * 107   CO2 19* 20*   BUN 32* 30*   CREA 1.86* 1.63*   * 295*   CA 8.5 9.0   MG  --  2.3     Recent Labs     12/11/22 2308   ALT 19   AP 85   TBILI 0.5   TP 7.9   ALB 3.6   GLOB 4.3*     No results for input(s): INR, PTP, APTT, INREXT in the last 72 hours. No results for input(s): FE, TIBC, PSAT, FERR in the last 72 hours. Lab Results   Component Value Date/Time    Folate 10.5 07/03/2018 09:24 AM      No results for input(s): PH, PCO2, PO2 in the last 72 hours. No results for input(s): CPK, CKNDX, TROIQ in the last 72 hours.     No lab exists for component: CPKMB  Lab Results   Component Value Date/Time    Cholesterol, total 143 10/12/2022 09:10 AM    HDL Cholesterol 49 10/12/2022 09:10 AM    LDL, calculated 41.4 10/12/2022 09:10 AM    Triglyceride 263 (H) 10/12/2022 09:10 AM CHOL/HDL Ratio 2.9 10/12/2022 09:10 AM     Lab Results   Component Value Date/Time    Glucose (POC) 283 (H) 12/08/2022 12:10 PM    Glucose (POC) 142 (H) 12/08/2022 08:33 AM    Glucose (POC) 211 (H) 12/07/2022 09:23 PM    Glucose (POC) 256 (H) 12/07/2022 04:55 PM    Glucose (POC) 375 (H) 12/07/2022 12:06 PM    Glucose,  (H) 12/11/2022 11:07 PM     Lab Results   Component Value Date/Time    Color YELLOW/STRAW 01/20/2021 08:56 AM    Appearance CLEAR 01/20/2021 08:56 AM    Specific gravity 1.015 01/20/2021 08:56 AM    Specific gravity 1.020 05/03/2014 08:18 AM    pH (UA) 5.5 01/20/2021 08:56 AM    Protein 300 (A) 01/20/2021 08:56 AM    Glucose Negative 01/20/2021 08:56 AM    Ketone Negative 01/20/2021 08:56 AM    Bilirubin Negative 01/20/2021 08:56 AM    Urobilinogen 0.2 01/20/2021 08:56 AM    Nitrites Negative 01/20/2021 08:56 AM    Leukocyte Esterase Negative 01/20/2021 08:56 AM    Epithelial cells FEW 01/20/2021 08:56 AM    Bacteria Negative 01/20/2021 08:56 AM    WBC 0-4 01/20/2021 08:56 AM    RBC 0-5 01/20/2021 08:56 AM         Medications Reviewed:     Current Facility-Administered Medications   Medication Dose Route Frequency    sodium chloride (NS) flush 5-40 mL  5-40 mL IntraVENous Q8H    sodium chloride (NS) flush 5-40 mL  5-40 mL IntraVENous PRN    acetaminophen (TYLENOL) tablet 650 mg  650 mg Oral Q6H PRN    Or    acetaminophen (TYLENOL) suppository 650 mg  650 mg Rectal Q6H PRN    polyethylene glycol (MIRALAX) packet 17 g  17 g Oral DAILY PRN    ondansetron (ZOFRAN ODT) tablet 4 mg  4 mg Oral Q8H PRN    Or    ondansetron (ZOFRAN) injection 4 mg  4 mg IntraVENous Q6H PRN    enoxaparin (LOVENOX) injection 40 mg  40 mg SubCUTAneous DAILY    furosemide (LASIX) injection 40 mg  40 mg IntraVENous BID    aspirin delayed-release tablet 81 mg  81 mg Oral DAILY    clopidogreL (PLAVIX) tablet 75 mg  75 mg Oral DAILY    rosuvastatin (CRESTOR) tablet 20 mg  20 mg Oral QHS    [Held by provider] sacubitriL-valsartan (ENTRESTO) 24-26 mg tablet 1 Tablet  1 Tablet Oral BID    tamsulosin (FLOMAX) capsule 0.4 mg  0.4 mg Oral DAILY    amiodarone (CORDARONE) 375 mg/250 mL D5W infusion  0.5-1 mg/min IntraVENous TITRATE     Current Outpatient Medications   Medication Sig    sacubitriL-valsartan (Entresto) 24-26 mg tablet Take 1 Tablet by mouth two (2) times a day. dapagliflozin (FARXIGA) 10 mg tab tablet Take 1 Tablet by mouth daily. glipiZIDE (GLUCOTROL) 5 mg tablet Take 1 tablet by mouth twice daily    metFORMIN (GLUCOPHAGE) 1,000 mg tablet TAKE 1 TABLET BY MOUTH TWICE DAILY WITH MEALS    tamsulosin (FLOMAX) 0.4 mg capsule Take 1 capsule by mouth once daily    ipratropium (ATROVENT) 21 mcg (0.03 %) nasal spray 2 Sprays by Both Nostrils route every twelve (12) hours. nitroglycerin (NITROSTAT) 0.4 mg SL tablet 1 Tablet by SubLINGual route every five (5) minutes as needed for Chest Pain. Up to 3 doses. Jardiance 25 mg tablet TAKE 1 TABLET BY MOUTH IN THE MORNING    ergocalciferol (ERGOCALCIFEROL) 1,250 mcg (50,000 unit) capsule Take 1 Capsule by mouth every seven (7) days. Januvia 50 mg tablet Take 1 tablet by mouth once daily    polyethylene glycol (MIRALAX) 17 gram/dose powder Take 17 g by mouth daily. albendazole (ALBENZA) 200 mg tablet TAKE 2 TABLETS BY MOUTH ONCE FOR 1 DOSE. THEN REPEAT IN ONE WEEK (Patient not taking: Reported on 12/5/2022)    clopidogreL (Plavix) 75 mg tab Take 1 Tablet by mouth daily for 360 days. Indications: a sudden worsening of angina called acute coronary syndrome    rosuvastatin (CRESTOR) 20 mg tablet Take 1 Tablet by mouth nightly. aspirin delayed-release 81 mg tablet Take 1 Tab by mouth.     ______________________________________________________________________  EXPECTED LENGTH OF STAY: - - -  ACTUAL LENGTH OF STAY:          0      Please note that this dictation was completed with [email or web address], the computer voice recognition software.   Quite often unanticipated grammatical, syntax, homophones, and other interpretive errors are inadvertently transcribed by the computer software. Please disregard these errors. Please excuse any errors that have escaped final proofreading.                 Cullen Bustillo MD

## 2022-12-12 NOTE — ED NOTES
0957: Metroprolol given  1007: Pt bp dropped Md notified. 1835:  Md notified of pt passing blood clots in urine

## 2022-12-12 NOTE — DISCHARGE INSTRUCTIONS
Download the Heart Failure Farwell Shannon: Search in your [employer] Mobile (Android) or [employer] Shannon Store ([employer]): Search for- HF Farwell Shannon.    First Cover[employer]    HF Farwell is a brand-new phone shannon that helps you track daily symptoms, vitals, mood, energy level and more. You can even add your heart failure care team members to view your data and monitor your condition at home.     HF Farwell lets you:  Track symptoms, medications and more  Share health information with your health care team  Connect with others living with heart failure

## 2022-12-12 NOTE — ED PROVIDER NOTES
60-year-old male presents from home via EMS with a complaint of chest tightness and shortness of breath. He was in the hospital from December 5 to 8 being treated for CHF and worsening CAD. He had a cardiac catheterization that displayed worsening vessel disease but not amenable to any further intervention. Decision was made to medically manage him. He was discharged home on the eighth. Patient states he has become more short of breath over the past couple of days with chest tightness tonight. Denies any fever, cough, vomiting, diarrhea. He took 1 nitroglycerin at home which did seem to help improve his pain somewhat. Denies any history of lung disease. Does not smoke cigarettes. States has been taking his medications as prescribed. Admission 12/5-12/8:  Hospital Course: Rock Galvin is a 71 y.o. 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 There is a long stent from the left main down the OM 2 via the circumflex. This shows some proximal in-stent restenosis in the circumflex and in the left main prior to the stent there is some ostial plaque that is worse from prior picture. The OM 2 appears to be patent. The OM1 is heavily diseased and is compared to prior visit cath shows that the bifurcation and the main vessel are more severely diseased and a smaller caliber unlikely to improve with stenting. Patient is treated medically. We attempted to treat with her heart failure but he had trouble tolerating some meds overnight because of relative hypotension. He did have some low blood pressures but was completely asymptomatic. He is somewhat variable historian but reports that he had no shortness of breath the day of discharge. I walked him in the ravi he appeared to be steady without shortness of breath. I spoke to his wife personally. He will follow-up in the office on the below medications. His blood pressures can limit his heart failure treatment significantly. His coronaries has progressed with continuing atherosclerosis despite a very good LDL on a high potency statin. Past Medical History:   Diagnosis Date    CAD (coronary artery disease) 11/10/2016    NSTEMI & 2 stents    Deafness 10/28/2012    DM (diabetes mellitus) ([facility] [address]     Elevated cholesterol     Hypertension     NSTEMI (non-ST elevated myocardial infarction) ([facility] [address] 11/10/2016       Past Surgical History:   Procedure Laterality Date    COLONOSCOPY N/A 6/28/2018    COLONOSCOPY performed by Anitra Garcia MD at [facility] ENDOSCOPY    [address] UNLIST  11/11/2016    2 stents         Family History:   Problem Relation Age of Onset    Heart Disease Father     Heart Attack Father     Hypertension Mother     Elevated Lipids Brother     Elevated Lipids Brother     No Known Problems Sister     Elevated Lipids Brother     No Known Problems Son     No Known Problems Daughter     Anesth Problems Neg Hx        Social History     Socioeconomic History    Marital status:      Spouse name: Not on file    Number of children: Not on file    Years of education: Not on file    Highest education level: Not on file   Occupational History    Not on file   Tobacco Use    Smoking status: Never     Passive exposure: Never    Smokeless tobacco: Never   Vaping Use    Vaping Use: Never used   Substance and Sexual Activity    Alcohol use:  Yes     Alcohol/week: 2.0 standard drinks     Types: 1 Cans of beer, 1 Shots of liquor per week     Comment: 1 DRINK DAILY    Drug use: No    Sexual activity: Yes   Other Topics Concern    Not on file   Social History Narrative    Not on file     Social Determinants of Health     Financial Resource Strain: Medium Risk    Difficulty of Paying Living Expenses: Somewhat hard   Food Insecurity: Food Insecurity Present    Worried About Running Out of Food in the Last Year: Never true    Ran Out of Food in the Last Year: Often true   Transportation Needs: Not on file   Physical Activity: Not on file   Stress: Not on file   Social Connections: Not on file   Intimate Partner Violence: Not on file   Housing Stability: Not on file         ALLERGIES: Patient has no known allergies. Review of Systems   Constitutional:  Negative for diaphoresis and fever. HENT:  Negative for facial swelling. Eyes:  Negative for visual disturbance. Respiratory:  Positive for shortness of breath. Negative for cough. Cardiovascular:  Negative for chest pain. Gastrointestinal:  Negative for abdominal pain. Genitourinary:  Negative for dysuria. Musculoskeletal:  Negative for joint swelling. Skin:  Negative for rash. Neurological:  Negative for headaches. Hematological:  Negative for adenopathy. Psychiatric/Behavioral:  Negative for suicidal ideas. Vitals:    12/11/22 2245   BP: 91/74   Pulse: (!) 133   Resp: 26   Temp: 97.8 °F (36.6 °C)   SpO2: 100%   Weight: 61.2 kg (135 lb)   Height: 5' 9\" (1.753 m)            Physical Exam  Vitals and nursing note reviewed. Constitutional:       General: He is not in acute distress. Appearance: He is well-developed. He is not ill-appearing. HENT:      Head: Normocephalic and atraumatic. Eyes:      Pupils: Pupils are equal, round, and reactive to light. Cardiovascular:      Rate and Rhythm: Tachycardia present. Pulmonary:      Effort: Pulmonary effort is normal. Tachypnea present. No respiratory distress. Breath sounds: No decreased breath sounds, wheezing or rhonchi. Abdominal:      General: There is no distension. Musculoskeletal:         General: Normal range of motion. Cervical back: Normal range of motion and neck supple. Skin:     General: Skin is warm and dry. Neurological:      Mental Status: He is alert and oriented to person, place, and time.         MDM  Number of Diagnoses or Management Options  Acute hyperkalemia  Acute renal insufficiency  Congestive heart failure, unspecified HF chronicity, unspecified heart failure type ([address]  Pneumonia of both lungs due to infectious organism, unspecified part of lung  Severe sepsis ([address]  Diagnosis management comments: A: Patient presents with shortness of breath tachycardia tachypnea. Elevated lactate and concern for pneumonia initially. IV fluids and antibiotics were started. However on further work-up, concern was heightened for possible CHF and cardiac decompensation. His troponin at 1500 is actually improved from his previous admission. His proBNP is somewhat elevated. A bedside cardiac ultrasound was performed which showed decreased EF of less than 20% with pulmonary edema on his lung exam.  I discussed these findings with Dr. Trevon Duke at the bedside who agreed to admit the patient. IV Lasix was ordered. Amount and/or Complexity of Data Reviewed  Clinical lab tests: reviewed  Tests in the radiology section of CPT®: reviewed  Tests in the medicine section of CPT®: reviewed      ED Course as of 12/11/22 2354   Sun Dec 11, 2022   2256 EKG, 12 LEAD, INITIAL  ED EKG interpretation:  Rhythm:sinus tach. Rate (approx.): 133. Axis: normal.  ST segment:  No concerning ST elevations or depressions. TWI inferior and lateral leads. This EKG was interpreted by Umesh Andrew MD,ED Provider. [JM]      ED Course User Index  [JM] Jael Quiñones MD     Perfect Serve Consult for Admission  12:24 AM    ED Room Number: ER15/15  Patient Name and age:  Mariza Kendall 71 y.o.  male  Working Diagnosis:   1. Severe sepsis ([address]    2. Acute hyperkalemia    3. Acute renal insufficiency    4. Pneumonia of both lungs due to infectious organism, unspecified part of lung    5.  Congestive heart failure, unspecified HF chronicity, unspecified heart failure type ([address]        COVID-19 Suspicion:  no  Sepsis present:  yes  Reassessment needed: no  Code Status:  Full Code  Readmission: yes  Isolation Requirements:  no  Recommended Level of Care:  step down  Department: [facility] Adult ED - [phone]  Admitting Provider:     Other:  CHF, reduced EF, Cr=1.6, K=6. Total critical care time spent exclusive of procedures:  35 minutes.     Procedures

## 2022-12-13 ENCOUNTER — APPOINTMENT (OUTPATIENT)
Dept: NON INVASIVE DIAGNOSTICS | Age: 72
End: 2022-12-13
Attending: NURSE PRACTITIONER
Payer: MEDICARE

## 2022-12-13 LAB
ANION GAP SERPL CALC-SCNC: 9 MMOL/L (ref 5–15)
BASOPHILS # BLD: 0.1 K/UL (ref 0–0.1)
BASOPHILS NFR BLD: 1 % (ref 0–1)
BUN SERPL-MCNC: 40 MG/DL (ref 6–20)
BUN/CREAT SERPL: 19 (ref 12–20)
CALCIUM SERPL-MCNC: 9.1 MG/DL (ref 8.5–10.1)
CHLORIDE SERPL-SCNC: 109 MMOL/L (ref 97–108)
CO2 SERPL-SCNC: 21 MMOL/L (ref 21–32)
CREAT SERPL-MCNC: 2.11 MG/DL (ref 0.7–1.3)
DIFFERENTIAL METHOD BLD: ABNORMAL
EOSINOPHIL # BLD: 0 K/UL (ref 0–0.4)
EOSINOPHIL NFR BLD: 0 % (ref 0–7)
ERYTHROCYTE [DISTWIDTH] IN BLOOD BY AUTOMATED COUNT: 18.4 % (ref 11.5–14.5)
GLUCOSE SERPL-MCNC: 200 MG/DL (ref 65–100)
HCT VFR BLD AUTO: 34.3 % (ref 36.6–50.3)
HGB BLD-MCNC: 10.2 G/DL (ref 12.1–17)
IMM GRANULOCYTES # BLD AUTO: 0 K/UL (ref 0–0.04)
IMM GRANULOCYTES NFR BLD AUTO: 0 % (ref 0–0.5)
LYMPHOCYTES # BLD: 0.9 K/UL (ref 0.8–3.5)
LYMPHOCYTES NFR BLD: 12 % (ref 12–49)
MCH RBC QN AUTO: 23.2 PG (ref 26–34)
MCHC RBC AUTO-ENTMCNC: 29.7 G/DL (ref 30–36.5)
MCV RBC AUTO: 78.1 FL (ref 80–99)
MONOCYTES # BLD: 1 K/UL (ref 0–1)
MONOCYTES NFR BLD: 12 % (ref 5–13)
NEUTS SEG # BLD: 5.9 K/UL (ref 1.8–8)
NEUTS SEG NFR BLD: 75 % (ref 32–75)
NRBC # BLD: 0 K/UL (ref 0–0.01)
NRBC BLD-RTO: 0 PER 100 WBC
PLATELET # BLD AUTO: 272 K/UL (ref 150–400)
PMV BLD AUTO: 11.4 FL (ref 8.9–12.9)
POTASSIUM SERPL-SCNC: 4.8 MMOL/L (ref 3.5–5.1)
POTASSIUM SERPL-SCNC: 5.9 MMOL/L (ref 3.5–5.1)
RBC # BLD AUTO: 4.39 M/UL (ref 4.1–5.7)
SODIUM SERPL-SCNC: 139 MMOL/L (ref 136–145)
WBC # BLD AUTO: 7.9 K/UL (ref 4.1–11.1)

## 2022-12-13 PROCEDURE — 74011250637 HC RX REV CODE- 250/637: Performed by: NURSE PRACTITIONER

## 2022-12-13 PROCEDURE — 74011250636 HC RX REV CODE- 250/636: Performed by: STUDENT IN AN ORGANIZED HEALTH CARE EDUCATION/TRAINING PROGRAM

## 2022-12-13 PROCEDURE — 84132 ASSAY OF SERUM POTASSIUM: CPT

## 2022-12-13 PROCEDURE — C8923 2D TTE W OR W/O FOL W/CON,CO: HCPCS

## 2022-12-13 PROCEDURE — 97165 OT EVAL LOW COMPLEX 30 MIN: CPT

## 2022-12-13 PROCEDURE — 80048 BASIC METABOLIC PNL TOTAL CA: CPT

## 2022-12-13 PROCEDURE — 97161 PT EVAL LOW COMPLEX 20 MIN: CPT

## 2022-12-13 PROCEDURE — 97530 THERAPEUTIC ACTIVITIES: CPT

## 2022-12-13 PROCEDURE — 74011000250 HC RX REV CODE- 250: Performed by: HOSPITALIST

## 2022-12-13 PROCEDURE — 85025 COMPLETE CBC W/AUTO DIFF WBC: CPT

## 2022-12-13 PROCEDURE — 65660000001 HC RM ICU INTERMED STEPDOWN

## 2022-12-13 PROCEDURE — 36415 COLL VENOUS BLD VENIPUNCTURE: CPT

## 2022-12-13 PROCEDURE — 74011250636 HC RX REV CODE- 250/636: Performed by: HOSPITALIST

## 2022-12-13 PROCEDURE — 74011250637 HC RX REV CODE- 250/637: Performed by: STUDENT IN AN ORGANIZED HEALTH CARE EDUCATION/TRAINING PROGRAM

## 2022-12-13 PROCEDURE — 99233 SBSQ HOSP IP/OBS HIGH 50: CPT | Performed by: INTERNAL MEDICINE

## 2022-12-13 PROCEDURE — 74011000250 HC RX REV CODE- 250: Performed by: STUDENT IN AN ORGANIZED HEALTH CARE EDUCATION/TRAINING PROGRAM

## 2022-12-13 PROCEDURE — 97116 GAIT TRAINING THERAPY: CPT

## 2022-12-13 PROCEDURE — 94660 CPAP INITIATION&MGMT: CPT

## 2022-12-13 RX ORDER — ENOXAPARIN SODIUM 100 MG/ML
30 INJECTION SUBCUTANEOUS DAILY
Status: DISCONTINUED | OUTPATIENT
Start: 2022-12-14 | End: 2022-12-16 | Stop reason: HOSPADM

## 2022-12-13 RX ADMIN — Medication 5 ML: at 21:38

## 2022-12-13 RX ADMIN — TAMSULOSIN HYDROCHLORIDE 0.4 MG: 0.4 CAPSULE ORAL at 08:27

## 2022-12-13 RX ADMIN — Medication 3 MG: at 21:35

## 2022-12-13 RX ADMIN — PERFLUTREN 1 ML: 6.52 INJECTION, SUSPENSION INTRAVENOUS at 15:22

## 2022-12-13 RX ADMIN — ENOXAPARIN SODIUM 40 MG: 100 INJECTION SUBCUTANEOUS at 08:27

## 2022-12-13 RX ADMIN — ASPIRIN 81 MG: 81 TABLET, COATED ORAL at 08:27

## 2022-12-13 RX ADMIN — Medication 10 ML: at 14:00

## 2022-12-13 RX ADMIN — ROSUVASTATIN 20 MG: 10 TABLET, FILM COATED ORAL at 21:35

## 2022-12-13 RX ADMIN — FUROSEMIDE 40 MG: 10 INJECTION, SOLUTION INTRAMUSCULAR; INTRAVENOUS at 17:17

## 2022-12-13 RX ADMIN — CLOPIDOGREL BISULFATE 75 MG: 75 TABLET ORAL at 08:27

## 2022-12-13 RX ADMIN — FUROSEMIDE 40 MG: 10 INJECTION, SOLUTION INTRAMUSCULAR; INTRAVENOUS at 08:27

## 2022-12-13 NOTE — PROGRESS NOTES
Occupational Therapy    Chart reviewed and discussed with RN. Patient currently on BiPAP with increased respiratory needs, awaiting cardiology input on this date as well and requesting to hold at this time. Will hold and re attempt as appropriate. Thank you.      Naty Granados, MARJAN, OTR/L

## 2022-12-13 NOTE — ROUTINE PROCESS
TRANSFER - OUT REPORT:    Verbal report given to Robin Parker (name) on Demond Cazares  being transferred to CVSU (unit) for routine progression of care       Report consisted of patients Situation, Background, Assessment and   Recommendations(SBAR). Information from the following report(s) SBAR, Kardex, ED Summary, Intake/Output, MAR, Cardiac Rhythm sinus tachy, and Quality Measures was reviewed with the receiving nurse. Lines:   Peripheral IV 12/11/22 Right Antecubital (Active)   Site Assessment Clean, dry, & intact 12/11/22 2311   Phlebitis Assessment 0 12/11/22 2311   Infiltration Assessment 0 12/11/22 2311   Dressing Status Clean, dry, & intact 12/11/22 2311   Dressing Type Transparent;Tape 12/11/22 2311       Peripheral IV 12/12/22 Anterior; Left Forearm (Active)        Opportunity for questions and clarification was provided.       Patient transported with:   Monitor  Registered Nurse  RT PERSON
CVC placed in setting of hypoxic respiratory failure  not included in cc time
ett placement in setting of hypoxic respiratory failure  not included in cc time

## 2022-12-13 NOTE — PROGRESS NOTES
[facility]  Hospitalist Group                                                                                          Hospitalist Progress Note  Ashlee Reyes MD  Answering service: [phone] OR [phone] from in house phone        Date of Service:  2022  NAME:  Sofia Messer  :  1950  MRN:  [id number]      Admission Summary:   Sofia Messer is a 71 y.o. male with hx of cad s/p cabg, ischemic cardiomyopathy, hfref, htn, ckd iii, pvd, dm ii who presents to hospital with complaints of sob and chest tightness. Had admission from  to  for cad and diagnostic left heart cath. Since discharge, reports progressive dyspnea with minimal exertion, chest tightness and orthopnea. Trail of sublingual nitro prior to ed presentation today offered no relief. The patient denies any fever, chills, chest or abdominal pain, nausea, vomiting, cough, congestion, recent illness, palpitations, or dysuria. Interval history / Subjective:     Patient seen and examined discussed with wife at bedside discussed with cardiology as well  Amiodarone drip is stopped patient is now on nasal cannula oxygen     Assessment & Plan:     Acute hypoxic respiratory failure on BiPAP --resolved now on nasal cannula  decompensated HFrEF last EF 35 to 40% aortic stenosis  -continue diuresis w/ lasix 40mg iv bid  -strict I&Os w/ daily weights  -monitor and replete lytes  -cardiology consult in am  --Holding beta-blocker and Entresto due to hypotension     Acute hypoxic respiratory failure --possibly due to volume overload pulmonary edema  -likely pum edema causing respiratory failure  -procal and rvp normal  -hold further abx     CAD native vessel with unstable angina and NSTEMI  Prior CABG in 2018, cath 2021 open LIMA, PCI to OM 2 all the way up to the left main 2022.  Cath  2022 showed open LIMA and RCA graft some in-stent restenosis in the long graft from the left main to OM 2 but not severe and the OM1 was more severely diseased in the small vessel. Continue aspirin and Plavix   Event for revascularization versus goal-directed medical therapy per cardiology  Patient was tachycardic unable to tolerate beta-blocker last admission s  Yesterday patient was on amiodarone bolus followed by drip now discontinued    BPH continue Flomax       CKD III  -stable. Monitor with daily bmp    Critically ill on BiPAP wean off as tolerates     Code status: Full code  DVT prophylaxis: Lovenox    Care Plan discussed with: Patient/Family and Nurse  Anticipated Disposition: Home w/Family  Anticipated Discharge: 24 hours to 48 hours         Hospital Problems  Date Reviewed: 12/5/2022            Codes Class Noted POA    CHF (congestive heart failure) ([facility] [address] ICD-10-CM: I50.9  ICD-9-CM: 428.0  12/12/2022 Unknown           Review of Systems:   A comprehensive review of systems was negative. Vital Signs:    Last 24hrs VS reviewed since prior progress note.  Most recent are:  Visit Vitals  BP (!) 115/54 (BP 1 Location: Left arm, BP Patient Position: At rest)   Pulse (!) 111   Temp 97.8 °F (36.6 °C)   Resp 25   Ht 5' 9\" (1.753 m)   Wt 61.2 kg (135 lb)   SpO2 100%   BMI 19.94 kg/m²         Intake/Output Summary (Last 24 hours) at 12/13/2022 1319  Last data filed at 12/13/2022 1256  Gross per 24 hour   Intake 526.66 ml   Output 3110 ml   Net -2583.34 ml          Physical Examination:     I had a face to face encounter with this patient and independently examined them on 12/13/2022 as outlined below:          General : alert x 3, awake, no acute distress, resting in bed, pleasant   HEENT: PEERL, EOMI, moist mucus membrane, TM clear  Neck: supple, no JVD, no meningeal signs  Chest: Clear to auscultation bilaterally   CVS: S1 S2 heard, Capillary refill less than 2 seconds  Abd: soft/ Non tender, non distended, BS physiological,   Ext: no clubbing, no cyanosis, no edema, brisk 2+ DP pulses  Neuro/Psych: pleasant mood and affect, CN 2-12 grossly intacT  Skin: warm     Data Review:    I personally reviewed  Image and LABS      Labs:     Recent Labs     12/13/22  0303 12/12/22  1840   WBC 7.9 9.3   HGB 10.2* 8.3*   HCT 34.3* 28.9*    257       Recent Labs     12/13/22  0303 12/12/22  0333 12/11/22  2308    136 134*   K 5.9* 5.2* 6.0*   * 110* 107   CO2 21 19* 20*   BUN 40* 32* 30*   CREA 2.11* 1.86* 1.63*   * 235* 295*   CA 9.1 8.5 9.0   MG  --   --  2.3       Recent Labs     12/11/22  2308   ALT 19   AP 85   TBILI 0.5   TP 7.9   ALB 3.6   GLOB 4.3*       No results for input(s): INR, PTP, APTT, INREXT, INREXT in the last 72 hours. No results for input(s): FE, TIBC, PSAT, FERR in the last 72 hours. Lab Results   Component Value Date/Time    Folate 10.5 07/03/2018 09:24 AM        No results for input(s): PH, PCO2, PO2 in the last 72 hours. No results for input(s): CPK, CKNDX, TROIQ in the last 72 hours.     No lab exists for component: CPKMB  Lab Results   Component Value Date/Time    Cholesterol, total 143 10/12/2022 09:10 AM    HDL Cholesterol 49 10/12/2022 09:10 AM    LDL, calculated 41.4 10/12/2022 09:10 AM    Triglyceride 263 (H) 10/12/2022 09:10 AM    CHOL/HDL Ratio 2.9 10/12/2022 09:10 AM     Lab Results   Component Value Date/Time    Glucose (POC) 283 (H) 12/08/2022 12:10 PM    Glucose (POC) 142 (H) 12/08/2022 08:33 AM    Glucose (POC) 211 (H) 12/07/2022 09:23 PM    Glucose (POC) 256 (H) 12/07/2022 04:55 PM    Glucose (POC) 375 (H) 12/07/2022 12:06 PM    Glucose,  (H) 12/11/2022 11:07 PM     Lab Results   Component Value Date/Time    Color YELLOW/STRAW 01/20/2021 08:56 AM    Appearance CLEAR 01/20/2021 08:56 AM    Specific gravity 1.015 01/20/2021 08:56 AM    Specific gravity 1.020 05/03/2014 08:18 AM    pH (UA) 5.5 01/20/2021 08:56 AM    Protein 300 (A) 01/20/2021 08:56 AM    Glucose Negative 01/20/2021 08:56 AM    Ketone Negative 01/20/2021 08:56 AM    Bilirubin Negative 01/20/2021 08:56 AM Urobilinogen 0.2 01/20/2021 08:56 AM    Nitrites Negative 01/20/2021 08:56 AM    Leukocyte Esterase Negative 01/20/2021 08:56 AM    Epithelial cells FEW 01/20/2021 08:56 AM    Bacteria Negative 01/20/2021 08:56 AM    WBC 0-4 01/20/2021 08:56 AM    RBC 0-5 01/20/2021 08:56 AM         Medications Reviewed:     Current Facility-Administered Medications   Medication Dose Route Frequency    [START ON 12/14/2022] enoxaparin (LOVENOX) injection 30 mg  30 mg SubCUTAneous DAILY    sodium chloride (NS) flush 5-40 mL  5-40 mL IntraVENous Q8H    sodium chloride (NS) flush 5-40 mL  5-40 mL IntraVENous PRN    acetaminophen (TYLENOL) tablet 650 mg  650 mg Oral Q6H PRN    Or    acetaminophen (TYLENOL) suppository 650 mg  650 mg Rectal Q6H PRN    polyethylene glycol (MIRALAX) packet 17 g  17 g Oral DAILY PRN    ondansetron (ZOFRAN ODT) tablet 4 mg  4 mg Oral Q8H PRN    Or    ondansetron (ZOFRAN) injection 4 mg  4 mg IntraVENous Q6H PRN    furosemide (LASIX) injection 40 mg  40 mg IntraVENous BID    aspirin delayed-release tablet 81 mg  81 mg Oral DAILY    clopidogreL (PLAVIX) tablet 75 mg  75 mg Oral DAILY    rosuvastatin (CRESTOR) tablet 20 mg  20 mg Oral QHS    tamsulosin (FLOMAX) capsule 0.4 mg  0.4 mg Oral DAILY    melatonin tablet 3 mg  3 mg Oral QHS PRN     ______________________________________________________________________  EXPECTED LENGTH OF STAY: 3d 19h  ACTUAL LENGTH OF STAY:          1      Please note that this dictation was completed with [employer], the computer voice recognition software. Quite often unanticipated grammatical, syntax, homophones, and other interpretive errors are inadvertently transcribed by the computer software. Please disregard these errors. Please excuse any errors that have escaped final proofreading.                 Gracie Jones MD

## 2022-12-13 NOTE — PROGRESS NOTES
Physician Progress Note      PATIENTGlendon Overall  CSN #:                  [id number]  :                       1950  ADMIT DATE:       2022 10:42 PM  [address] [place] DATE:  RESPONDING  PROVIDER #:        Osmin Whatley MD          QUERY TEXT:    Pt admitted with CHF . Pt noted to have concern for pneumonia and antibiotics stopped . If possible, please document in the progress notes and discharge summary if you are evaluating and/or treating any of the following:[[MSSA pneumonia::This patient has MSSA pneumonia. Note: CAP and HCAP indicate where the pneumonia was acquired, not a specific type. The medical record reflects the following:  Risk Factors: abnormal CT  Clinical Indicators:   PN  Acute hypoxic respiratory failure concern for pneumonia  -likely pum edema causing respiratory failure  -procal and rvp normal  -hold further abx     CT  IMPRESSION  1. Slightly increased small to moderate pleural effusions. 2. Overall mild increase diffuse interlobular septal thickening and patchy  bilateral lung opacities in keeping with pulmonary edema with superimposed  infection/inflammation not excluded. Follow up to resolution is suggested. Treatment: maxipime IV, zosyn IV, vibramycin IV, lavaquin IV    Thank you,  Grayson Hanna RN, CDI, CRCR, CCDS  Certified Clinical Documentation   [phone]  You can also contact me via [address] [place] [address]. Options provided:  -- Pneumonia ruled out  -- Gram negative pneumonia  -- Gram positive pneumonia  -- MRSA pneumonia  -- Other - I will add my own diagnosis  -- Disagree - Not applicable / Not valid  -- Disagree - Clinically unable to determine / Unknown  -- Refer to Clinical Documentation Reviewer    PROVIDER RESPONSE TEXT:    Pneumonia ruled out .     Query created by: Mic Oropeza on 2022 11:15 AM      Electronically signed by:  Osmin Whatley MD 2022 1:16 PM

## 2022-12-13 NOTE — PROGRESS NOTES
Lovenox Monitoring  Indication: DVT Prophylaxis  Recent Labs     12/13/22  0303 12/12/22  1840 12/12/22  0333 12/11/22  2308   HGB 10.2* 8.3*  --  10.8*    257  --  313   CREA 2.11*  --  1.86* 1.63*     Current Weight: 61.2 kg  Est. CrCl = 27 ml/min  Current Dose: 40 mg subcutaneously every 24 hours.   Plan: Change to 30mg SQ q24h per [facility] P&T protocol for Crcl 15-29 ml/min

## 2022-12-13 NOTE — ED NOTES
Patients BP dropped from 188/77 to 86/67. Notified NP who came down to evaluate pt. NP talked with MD and this is a normal trend for this patient. Pt stable to transfer to [facility]. Respiratory called again.

## 2022-12-13 NOTE — PROGRESS NOTES
[facility]  Cardiology Care Note                  [x]Initial visit     []Established visit     Patient Name: Allison LICONA:[id number]  Primary Cardiologist: Gaurang Lobo MD  Consulting Cardiologist: Bertha Petty MD     Reason for initial visit:  dyspnea, decompensated HF, tachycardia. HPI:     CAD with CABG 6/9/2018. NSTEMI in November 2016 and resolved pulmonary HTN. Stent to circumflex an LAD in 2016. Stress echo in 2018 with a drop in BP with exercise and a drop in EF. Cath on 6/22/18 that showed even with a 5F catheter, he dampened with suspected ostial 3 vessel disease. Echo 3/27/2021 = EF 55 to 60% mild AR MR TR LAE MAC  Nuclear 3/18/2021 EF 64% medium size defect apical and inferior lateral reversible ischemia medium defect mid and inferolateral nonreversible appear to be infarction intermediate risk stress test .  PCI 02/28/2022--patent LIMA to LAD, vein graft to distal RCA with severe disease in the native vessels Occluded vein graft to OM 2. Native OM 2 severely diseased along with proximal to mid circumflex as well as distal left main. Overall the vessel is significantly remodeled negatively. Additionally there is significant disease in the first marginal branch which is even smaller as well as AV groove branch right at the takeoff of OM2. Single stent 2.25 x 28 mm Xience was placed extending from the OM 2 into the circumflex into the distal left main and sequentially dilated with 2.25 noncompliant, 2 5 noncompliant, 3 oh noncompliant and 3 5 noncompliant to perform multilevel POT to manage multiple bifurcations on the way. Atherectomy was considered but given small size of the vessels, was not deemed to be absolutely safe for the obtuse marginal lesion. Overall stent expanded fairly well related to the size of the vessels.  Normal LVEDP  Nuclear stress October 27, 2022 test showed ischemia similar to 3/15/2021 with a medium size defect in the mid to distal inferolateral and anterolateral segments in the circumflex territory he had septal dyskinesia with an EF of 39%. He had a fixed apical infarct  Echocardiogram October 27, 2022 showed an EF of 45 to 50% TAPSE at 1.2 showing reduced RV function sclerosis of the aortic valve with stenosis that was very mild with a valve area of mean of 7 mmHg and HUY of 1.4 cm². The mitral was thickened with restricted mobility and mild MR.      SUBJECTIVE:    Pt discharged home last week from [facility]. Reports medication compliance. States he felt fine until sat he began with dyspnea progressing to limiting cough, dyspnea. Denies chest pain. Currently on Bipap. Assessment and Plan       Dyspnea/decompensated HFrEF- tachycardic,Chest CT with Overall mild increase diffuse interlobular septal thickening and patchy bilateral lung opacities in keeping with pulmonary edema with superimposed infection/inflammation not excluded. Diurese with IV lasix. Unlikely to be a candidate for entresto given severe hypotensiona and hyperkalemia. Hold BB since BP still marginal and this may be compensatory response. Overall better. 2  CAD native vessel with unstable angina and NSTEMI  Prior CABG in 2018, cath 9/8/2021 open LIMA, PCI to OM 2 all the way up to the left main 2/28/2022. Cath yesterday 12/6/2022 showed open LIMA and RCA graft some in-stent restenosis in the long graft from the left main to OM 2 but not severe and the OM1 was more severely diseased in the small vessel. Interventional cardiology felt no reasonable vessel to intervene and recommended medical management.   The OM disease is consistent with a lateral wall ischemia demonstrated on prior nuclear stress test.  Continue aspirin and Plavix noted mild anemia-hemoglobin on admission (10.8)    3  Cardiomyopathy systolic heart failure  NYHA III with shortness of breath at short distance EF40-45%. Will repeat echo. Overall WMA on recent echo appears not to be in teritorry of ischemia suggesting alternative mechanism fo cardiomyopathy(stress SMP vs myocarditis)    4. Aortic stenosis mild mean gradient of 7 nn Hg, HUY 1.4 by echo 11/27/2022 -mild , murmur     5. Diabetes mellitus type 2 previously managed by hospitalist service now has a glucose of 377 patient is currently on antihyperglycemic's and will involve diabetes treatment center for assistance  On insulin lispro and alogliptin    6  Hypertension previously hypertensive now with low blood pressure  Will review meds and consider changes based on optimal medical therapy for congestive heart failure    7. 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 Likely secondary to hypotension and entresto. May cautiously continue diuretics. May have to hold tomorrow if creatinine continues to rise. 8 XOL  Statin intolerant-improved. Interesting that he had such progression of coronary disease despite being relatively low and weight not sedentary and having to low LDL on a high potency statin. If his LDL was higher I consider adding a PCSK9 but I cannot really recommend that given his LDL 41. At goal , denies excess muscle aches or new liver issues               ____________________________________________________________    Cardiac testing  10/27/22    ECHO ADULT COMPLETE 10/27/2022 10/27/2022    Interpretation Summary    Left Ventricle: Mildly reduced left ventricular systolic function with a visually estimated EF of 45 - 50%. Left ventricle size is normal. Normal wall thickness. Normal wall motion. Abnormal diastolic function. Right Ventricle: Reduced systolic function. TAPSE is 1.2 cm. Aortic Valve: Valve structure is normal. Mild sclerosis of the aortic valve cusp. Mild annular calcification. Mild regurgitation. Mild stenosis of the aortic valve. AV mean gradient is 7 mmHg. AV peak gradient is 13 mmHg. LVOT:AV VTI Index is 0.55.  AV area by peak velocity is 1.4 cm2. Mitral Valve: Moderately thickened leaflet. Mild annular calcification of the mitral valve. The posterior leaflet of the mitral valve has restricted mobility. Mild regurgitation. Left Atrium: Left atrium is dilated. Signed by: Shola Marrufo MD on 10/27/2022  4:49 PM        10/27/22    NUCLEAR CARDIAC STRESS TEST 10/27/2022 11/1/2022    Interpretation Summary    Nuclear Findings: The study is positive for myocardial ischemia. The defect appears to be ischemia. The areas of ischemia are similar to 3/15/2021 study. Nuclear Findings: There is a moderate severity left ventricular stress perfusion defect that is medium in size present in the mid to distal inferolateral and anterolateral segment(s) that is reversible. The defect is consistent with abnormal perfusion in the LCx territory. There is normal wall motion in the defect area. The defect appears to be probable ischemia. There is a moderate severity second left ventricular stress perfusion defect. The defect appears to be infarction. Nuclear Findings: Abnormal left ventricular systolic function post-stress. Septal dyskinesis. Post-stress ejection fraction is 39%. ECG: Resting ECG demonstrates normal sinus rhythm. ECG: Stress ECG was negative for ischemia. Stress Test: A pharmacological stress test was performed using lexiscan. Blood pressure demonstrated a normal response and heart rate demonstrated a normal response to stress. The patient's heart rate recovery was normal. The patient reported no symptoms during the stress test.    Signed by: Shola Marrufo MD on 10/27/2022  4:45 PM    02/28/22    CARDIAC PROCEDURE 02/28/2022 2/28/2022    Conclusion  Findings  1. Severe native multivessel coronary disease  2. Patent LIMA to LAD, vein graft to distal RCA with severe disease in the native vessels  3. Occluded vein graft to OM 2.   Native OM 2 severely diseased along with proximal to mid circumflex as well as distal left main. Overall the vessel is significantly remodeled negatively. Additionally there is significant disease in the first marginal branch which is even smaller as well as AV groove branch right at the takeoff of OM2. Single stent 2.25 x 28 mm Xience was placed extending from the OM 2 into the circumflex into the distal left main and sequentially dilated with 2.25 noncompliant, 2 5 noncompliant, 3 oh noncompliant and 3 5 noncompliant to perform multilevel POT to manage multiple bifurcations on the way. Atherectomy was considered but given small size of the vessels, was not deemed to be absolutely safe for the obtuse marginal lesion. Overall stent expanded fairly well related to the size of the vessels. 4.  Normal LVEDP    Access right radial: Small vessel, tortuous} brachiocephalic  Contrast 65 cc    Recommendations  1. Plavix based dual antiplatelet therapy  2. Guideline directed medical therapy for secondary prevention of coronary events    Signed by: Shayy Gandhi MD on 2/28/2022  4:22 PM        Most recent HS troponins:  Recent Labs     12/11/22  2308   TROPHS 1,559*         Review of Systems    [x]All other systems reviewed and all negative except as written in HPI    [] Patient unable to provide secondary to condition         Past Medical History:   Diagnosis Date    CAD (coronary artery disease) 11/10/2016    NSTEMI & 2 stents    Deafness 10/28/2012    DM (diabetes mellitus) ([facility] [address]     Elevated cholesterol     Hypertension     NSTEMI (non-ST elevated myocardial infarction) ([facility] [address] 11/10/2016     Past Surgical History:   Procedure Laterality Date    COLONOSCOPY N/A 6/28/2018    [facility]    2 stents     Social Hx:  reports that he has never smoked. He has never been exposed to tobacco smoke.  He has never used smokeless tobacco. He reports current alcohol use of about 2.0 standard drinks per week. He reports that he does not use drugs. Family Hx: family history includes Elevated Lipids in his brother, brother, and brother; Heart Attack in his father; Heart Disease in his father; Hypertension in his mother; No Known Problems in his daughter, sister, and son. No Known Allergies       OBJECTIVE:  Wt Readings from Last 3 Encounters:   12/11/22 135 lb (61.2 kg)   12/05/22 130 lb (59 kg)   12/05/22 130 lb 3.2 oz (59.1 kg)       Intake/Output Summary (Last 24 hours) at 12/13/2022 1650  Last data filed at 12/13/2022 1600  Gross per 24 hour   Intake 526.66 ml   Output 3510 ml   Net -2983.34 ml             Physical Exam    Vitals:   Vitals:    12/13/22 1400 12/13/22 1409 12/13/22 1412 12/13/22 1414   BP:  (!) 104/53 (!) 107/48 (!) 116/45   Pulse: (!) 113 (!) 111 (!) 112 (!) 117   Resp:       Temp:       SpO2:  100% 100% 100%   Weight:       Height:         Telemetry: normal sinus rhythm    BP (!) 116/45 (BP Patient Position: Standing)   Pulse (!) 117   Temp 97.8 °F (36.6 °C)   Resp 25   Ht 5' 9\" (1.753 m)   Wt 135 lb (61.2 kg)   SpO2 100%   BMI 19.94 kg/m²   General:    Alert, cooperative, no distress. Psychiatric:    Normal Mood and affect    Eye/ENT:      Pupils equal, No asymmetry, Conjunctival pink. Able to hear voice at normal amplitude   Lungs:      Visibly symmetric chest expansion, No palpable tenderness. significant bibasilar crackles. Heart[de-identified]    Regular rate and rhythm, S1, S2 normal, no murmur, click, rub or gallop. No JVD, Normal palpable peripheral pulses. No cyanosis   Abdomen:     Soft, non-tender. Bowel sounds normal. No masses,  No      organomegaly. Extremities:   Extremities normal, atraumatic, no edema. Neurologic:   CN II-XII grossly intact.  No gross focal deficits           Data Review:     Radiology:   XR Results (most recent):  Results from Hospital Encounter encounter on 12/11/22    XR CHEST PORT    Narrative  EXAM:  XR CHEST PORT    INDICATION: Chest pain and shortness of breath    COMPARISON: 12/5/2022    TECHNIQUE: Portable AP upright chest view at 2315 hours    FINDINGS: The cardiomediastinal contours are stable. There are increased diffuse interstitial and patchy airspace opacities. There  are small pleural effusions. There is no pneumothorax. The bones and upper  abdomen are stable. Impression  Increased diffuse interstitial and patchy airspace opacities can be seen with  pulmonary edema or infection. Small pleural effusions. CT Results (most recent):  Results from Hospital Encounter encounter on 12/11/22    CT CHEST WO CONT    Narrative  EXAM:  CT CHEST WO CONT    INDICATION:  Shortness of breath    COMPARISON: Radiograph 12/11/2022. CT 12/5/2022. TECHNIQUE: Helical CT of the chest is performed without intravenous contrast.  Coronal and sagittal reformatted images are obtained. CT dose reduction was  achieved through use of a standardized protocol tailored for this examination  and automatic exposure control for dose modulation. Adaptive statistical  iterative reconstruction (ASIR) was utilized. FINDINGS:  The visualized thyroid gland is unremarkable. The aorta and main pulmonary  artery are stable in caliber. CABG surgical changes are noted. The heart size is  stable. There is no pericardial effusion. There are no enlarged axillary, mediastinal, or hilar lymph nodes. There is slightly increased small to moderate pleural effusions. Diffuse  interlobular septal thickening with patchy bilateral lung opacities is mildly  increased since 12/5/2022. There is cholelithiasis. The right adrenal nodule is unchanged. There is no  acute abnormality in the visualized upper abdomen. There are degenerative  changes in the spine. There is no aggressive bony lesion. Impression  1. Slightly increased small to moderate pleural effusions.     2. Overall mild increase diffuse interlobular septal thickening and patchy  bilateral lung opacities in keeping with pulmonary edema with superimposed  infection/inflammation not excluded. Follow up to resolution is suggested. MRI Results (most recent):  [facility]    MRI LUMB SPINE WO CONT    Narrative  **Final Report**      ICD Codes / Adm. Diagnosis: 724.2  M54.5 / Lumbago  Low back pain  Examination:  MR Darrell Lopez CON  - [id number] - May 22 2016  1:45PM  Accession No:  [id number]  Reason:  Low back pain      REPORT:  Indication: Pain and back extends into right leg to foot    Exam: MRI of the lumbar spine. Sequences include sagittal and axial T1 and  T2-weighted images. Sagittal STIR. Comparisons: April 27, 2016    Contrast: None    Findings: Alignment is normal. There is no evidence of marrow replacement or  acute fracture. Cord terminus is within normal limits. Paraspinous soft  tissues are within normal limits. T12-L1: No stenosis    L1-L2: There is desiccation of this disc with a small bulge but no stenosis    L2-L3: There is desiccation of this disc with a small bulge but no stenosis    L3-L4: There is mild left facet arthropathy but no stenosis    L4-L5: There is disc height loss with a small disc bulge. Facet arthropathy. No stenosis    L5-S1: There is disc height loss with a small disc bulge and left  paracentral disc extrusion extending inferiorly. This mildly distorts the  left S1 nerve root in the subarticular zone and left lateral recess. There  are facet degenerative changes with moderate left and mild-to-moderate right  foraminal narrowing    Impression  :  1. Multilevel degenerative change detailed by level above most significant  at L5-S1                Signing/Reading Doctor: Sandi Lucas ([id number])  Approved: Sandi Lucas ([id number])  May 23 2016  8:39AM        No results for input(s): CPK, TROIQ in the last 72 hours.     No lab exists for component: CKQMB, ANNE Hardin  Recent Labs     12/13/22  3621 12/13/22  0303 12/12/22  0333   NA  --  139 136   K 4.8 5.9* 5.2*   CL  --  109* 110*   CO2  --  21 19*   BUN  --  40* 32*   CREA  --  2.11* 1.86*   GLU  --  200* 235*   CA  --  9.1 8.5       Recent Labs     12/13/22  0303 12/12/22  1840   WBC 7.9 9.3   HGB 10.2* 8.3*   HCT 34.3* 28.9*    257       Recent Labs     12/11/22  2308   AP 85       No results for input(s): CHOL, LDLC in the last 72 hours. No lab exists for component: TGL, HDLC,  HBA1C  No results for input(s): CRP, TSH, TSHEXT, TSHEXT in the last 72 hours.     No lab exists for component: ESR          Current meds:    Current Facility-Administered Medications:     [START ON 12/14/2022] enoxaparin (LOVENOX) injection 30 mg, 30 mg, SubCUTAneous, DAILY, Rosa M Rodriguez MD    sodium chloride (NS) flush 5-40 mL, 5-40 mL, IntraVENous, Q8H, Heber Barajas MD, 10 mL at 12/13/22 1400    sodium chloride (NS) flush 5-40 mL, 5-40 mL, IntraVENous, PRN, Heber Barajas MD    acetaminophen (TYLENOL) tablet 650 mg, 650 mg, Oral, Q6H PRN **OR** acetaminophen (TYLENOL) suppository 650 mg, 650 mg, Rectal, Q6H PRN, Heber Barajas MD    polyethylene glycol (MIRALAX) packet 17 g, 17 g, Oral, DAILY PRN, Heber Barajas MD    ondansetron (ZOFRAN ODT) tablet 4 mg, 4 mg, Oral, Q8H PRN **OR** ondansetron (ZOFRAN) injection 4 mg, 4 mg, IntraVENous, Q6H PRN, Heber Barajas MD    furosemide (LASIX) injection 40 mg, 40 mg, IntraVENous, BID, Heber Barajas MD, 40 mg at 12/13/22 0827    aspirin delayed-release tablet 81 mg, 81 mg, Oral, DAILY, Heber Barajas MD, 81 mg at 12/13/22 0827    clopidogreL (PLAVIX) tablet 75 mg, 75 mg, Oral, DAILY, Heber Barajas MD, 75 mg at 12/13/22 0827    rosuvastatin (CRESTOR) tablet 20 mg, 20 mg, Oral, QHS, Heber Barajas MD    tamsulosin (FLOMAX) capsule 0.4 mg, 0.4 mg, Oral, DAILY, Heber Barajas MD, 0.4 mg at 12/13/22 0827    melatonin tablet 3 mg, 3 mg, Oral, QHS PRN, Sigrid Holcomb, Jael Bedolla, NP    [facility] Sherman Silva MD  [facility] [address] [place] [address] [address] [address]  Horacio Robinsmorene  [phone]      Stacia Jean Baptiste MD

## 2022-12-13 NOTE — PROGRESS NOTES
Physical Therapy:  12/13/2022    Orders received and chart reviewed in preparation for PT evaluation. Noted elevated troponin 1,559 2 days ago no with new reading. Patient currently on BiPAP. Spoke with RN who requested PT defer pending updated cardiology recommendation.      Thank you,  Matias Perry, PT, DPT

## 2022-12-13 NOTE — PROGRESS NOTES
Transitions of Care Plan  RUR: 20% - high  Clinical Update: BIPAP this AM; CHF exacerbation; IV diuretic  Consults:   Baseline: independent without DME; resides w wife  Barriers to Discharge: medical  Disposition: home vs home health  Estimated Discharge Date: 2+ days    Reason for Readmission:     CHF Exacerbation; pt became short of breath over the weekend         RUR Score/Risk Level:     20% - high    PCP: First and Last name:  Zach Medina MD   Name of Practice: [facility]   Are you a current patient: Yes/No: Yes   Approximate date of last visit: within the past couple of months   Can you participate in a virtual visit with your PCP: No    Is a Care Conference indicated:  No    Did you attend your follow up appointment (s): If not, why not: No - patient admitted prior to his follow up appointments       Resources/supports as identified by patient/family:   Family support; pt is completely independent at baseline       Top Challenges facing patient (as identified by patient/family and CM): Medical complexity with HF management; needs more education related to HF management and medications    Finances/Medication cost?     No concerns  Transportation      Self or wife  Support system or lack thereof? Resides w supportive spouse; daughter lives in [place]; son lives in [place], [place]   Living arrangements? Resides w wife at address on file     Self-care/ADLs/Cognition? Independent without DME; AOx4       Current Advanced Directive/Advance Care Plan:         Pt has AMD; not w/ him or scanned into the system; wife is primary [facility]. decision maker-    Wife Annie Manual - p: [phone]    Plan for utilizing home health:   Possible - will follow for treatment team recommendations; pt agreeable to [address] [facility] ordered             Transition of Care Plan:    Based on readmission, the patient's previous Plan of Care   has been evaluated and/or modified.  The current Transition -of Care Plan is:           Patient readmitted to [facility] for progressive dyspnea on exertion after being discharged on 12/8. Wife became concerned when patient was out of breath at the gas station. Patient on BIPAP this morning; per chart review - he has a Pulmonary appointment scheduled w/ Dr. Vaibhav Augustine on 12/19 at 2:45PM. Patient also has an upcoming Cardiology appointment with [facility] on 12/19 at 8:20AM. Patient's wife able to drive patient. He remains independent without DME. He is medically complex with his HF management. Patient was unable to  his Entresto at [place] due to pharmacy being out of stock after previous hospitalization last week. CM will continue to follow for needs. Disposition:  Home with family vs Home Health  Transportation: Family [place], [address]  Available via [facility] or  [phone]    Care Management Interventions  PCP Verified by CM: Yes  Palliative Care Criteria Met (RRAT>21 & CHF Dx)?: No  Mode of Transport at Discharge:  Other (see comment)  Transition of Care Consult (CM Consult): Discharge Planning  MyChart Signup: Yes  Discharge Durable Medical Equipment: No  Health Maintenance Reviewed: Yes  Physical Therapy Consult: Yes  Occupational Therapy Consult: Yes  Speech Therapy Consult: No  Support Systems: Spouse/Significant Other  Confirm Follow Up Transport: Family  Discharge Location  Patient Expects to be Discharged to[de-identified] Home with family assistance

## 2022-12-13 NOTE — PROGRESS NOTES
Problem: Self Care Deficits Care Plan (Adult)  Goal: *Acute Goals and Plan of Care (Insert Text)  Description: FUNCTIONAL STATUS PRIOR TO ADMISSION: Patient was independent and active without use of DME.     HOME SUPPORT: The patient lived with spouse but did not require assist.    Occupational Therapy Goals  Initiated 12/13/2022  1. Patient will perform simple home management with independence within 7 day(s). 2.  Patient will perform bathing with independence within 7 day(s). 3.  Patient will perform grooming standing at sink with independence within 7 day(s). 4.  Patient will perform toilet transfers with independence within 7 day(s). 5.  Patient will perform all aspects of toileting with independence within 7 day(s). 6.  Patient will participate in upper extremity therapeutic exercise/activities with independence for 5 minutes within 7 day(s). 7.  Patient will utilize energy conservation techniques during functional activities with verbal cues within 7 day(s). Outcome: Progressing Towards Goal     OCCUPATIONAL THERAPY EVALUATION  Patient: Debora Oconnor (76 y.o. male)  Date: 12/13/2022  Primary Diagnosis: CHF (congestive heart failure) ([facility] [address] [I50.9]       Precautions:   Fall    ASSESSMENT  Based on the objective data described below, the patient presents with impaired balance, decreased endurance and generalized weakness following admission for SOB and chest tightness. sob and chest tightness. Patient recently admitted to hospital on 12/5 and underwent L heart cath. Patient received supine in bed on 7L O2 via NC, reading at 100%- transitioned to 5L O2 via NC. Patient able to complete brief bedroom and hallway mobility with CGA, able to titrate to 2L O2 via NC with sats reading at 100%. Following mobility, patient able to complete LB dressing tasks without fatigue or difficulty.  RN at bedside to perform ECHO, patient returned to supine in bed at conclusion of session, remained on 2L O2 via NC. Anticipate patient will benefit from 1 more OT session to focus on energy conservation. Anticipate pt will likely have no OT needs at [place] as he is currently functioning at an overall SPV - min A level for ADLs. Current Level of Function Impacting Discharge (ADLs/self-care):   Feeding: Independent    Oral Facial Hygiene/Grooming: Independent    Bathing: Minimum assistance      Upper Body Dressing: Supervision    Lower Body Dressing: Supervision    Toileting: Supervision       Functional Outcome Measure: The patient scored Total: 75/100 on the Barthel Index outcome measure which is indicative of being minimally independent in basic self-care. Other factors to consider for discharge: below baseline, readmission     Patient will benefit from skilled therapy intervention to address the above noted impairments. PLAN :  Recommendations and Planned Interventions: self care training, functional mobility training, therapeutic exercise, balance training, therapeutic activities, endurance activities, patient education, home safety training, and family training/education    Frequency/Duration: Patient will be followed by occupational therapy 3 times a week to address goals. Recommendation for discharge: (in order for the patient to meet his/her long term goals)  No skilled occupational therapy/ follow up rehabilitation needs identified at this time. This discharge recommendation:  Has been made in collaboration with the attending provider and/or case management    IF patient discharges home will need the following DME: patient owns DME required for discharge       SUBJECTIVE:   Patient stated [facility] help.     OBJECTIVE DATA SUMMARY:   HISTORY:   Past Medical History:   Diagnosis Date    CAD (coronary artery disease) 11/10/2016    NSTEMI & 2 stents    Deafness 10/28/2012    DM (diabetes mellitus) (HCC)     Elevated cholesterol     Hypertension     NSTEMI (non-ST elevated myocardial infarction) ([address] 11/10/2016     Past Surgical History:   Procedure Laterality Date    COLONOSCOPY N/A 6/28/2018    COLONOSCOPY performed by Natalie Kirkland MD at [address]  11/11/2016    2 stents       Expanded or extensive additional review of patient history:     Home Situation  Home Environment: Private residence  # Steps to Enter: 4  Rails to Enter: Yes  Hand Rails : Right  One/Two Story Residence: Two story  # of Interior Steps: 12  Interior Rails: Left  Living Alone: No  Support Systems: Spouse/Significant Other  Patient Expects to be Discharged to[de-identified] Home with family assistance  Current DME Used/Available at Home: None  Tub or Shower Type: Shower    Hand dominance: Right    EXAMINATION OF PERFORMANCE DEFICITS:  Cognitive/Behavioral Status:  Neurologic State: Alert  Orientation Level: Oriented X4  Cognition: Appropriate decision making  Perception: Appears intact  Perseveration: No perseveration noted  Safety/Judgement: Fall prevention;Home safety        Hearing: Auditory  Auditory Impairment: Hard of hearing, bilateral    Vision/Perceptual:                           Acuity: Within Defined Limits         Range of Motion:    AROM: Within functional limits                         Strength:    Strength: Generally decreased, functional                Coordination:  Coordination: Within functional limits  Fine Motor Skills-Upper: Left Intact; Right Intact    Gross Motor Skills-Upper: Left Intact; Right Intact    Tone & Sensation:    Tone: Normal  Sensation: Intact                      Balance:  Sitting: Intact; Without support  Standing: Intact; Without support    Functional Mobility and Transfers for ADLs:  Bed Mobility:  Rolling: Supervision  Supine to Sit: Supervision  Sit to Supine: Supervision  Scooting: Supervision    Transfers:  Sit to Stand: Stand-by assistance  Stand to Sit: Stand-by assistance    ADL Assessment:  Feeding: Independent    Oral Facial Hygiene/Grooming: Independent    Bathing: Minimum assistance      Upper Body Dressing: Supervision    Lower Body Dressing: Supervision    Toileting: Supervision                ADL Intervention and task modifications:                 Type of Bath: Chlorhexidine (CHG)              Lower Body Dressing Assistance  Socks: Supervision;Set-up  Position Performed: Seated edge of bed         Cognitive Retraining  Safety/Judgement: Fall prevention;Home safety    Functional Measure:    Barthel Index:  Bathin  Bladder: 10  Bowels: 10  Groomin  Dressing: 10  Feeding: 10  Mobility: 10  Stairs: 5  Toilet Use: 5  Transfer (Bed to Chair and Back): 10  Total: 75/100      The Barthel ADL Index: Guidelines  1. The index should be used as a record of what a patient does, not as a record of what a patient could do. 2. The main aim is to establish degree of independence from any help, physical or verbal, however minor and for whatever reason. 3. The need for supervision renders the patient not independent. 4. A patient's performance should be established using the best available evidence. Asking the patient, friends/relatives and nurses are the usual sources, but direct observation and common sense are also important. However direct testing is not needed. 5. Usually the patient's performance over the preceding 24-48 hours is important, but occasionally longer periods will be relevant. 6. Middle categories imply that the patient supplies over 50 per cent of the effort. 7. Use of aids to be independent is allowed. Score Interpretation (from [facility])    Independent   60-79 Minimally independent   40-59 Partially dependent   20-39 Very dependent   <20 Totally dependent     -Tomas Page., Barthel, D.W. (1965). Functional evaluation: the Barthel Index. [address] ([address] (1997).  The Barthel activities of daily living index: self-reporting versus actual performance in the old (> or = 75 years). Journal of [address] [place] [address] 457, [facility], .ABAF, Mulugeta Neal., Shaista Norris (1999). Measuring the change in disability after inpatient rehabilitation; comparison of the responsiveness of the Barthel Index and Functional [place] Measure. Journal of Neurology, Neurosurgery, and Psychiatry, 66(4), 336-110. CASSIE Nuñez, PIPE Guzman, & Lissette Coker M.A. (2004) Assessment of post-stroke quality of life in cost-effectiveness studies: The usefulness of the Barthel Index and the EuroQoL-5D. Quality of Life Research, 15, 770-79     Occupational Therapy Evaluation Charge Determination   History Examination Decision-Making   LOW Complexity : Brief history review  LOW Complexity : 1-3 performance deficits relating to physical, cognitive , or psychosocial skils that result in activity limitations and / or participation restrictions  LOW Complexity : No comorbidities that affect functional and no verbal or physical assistance needed to complete eval tasks       Based on the above components, the patient evaluation is determined to be of the following complexity level: LOW   Pain Rating:  Pt did not report pain    Activity Tolerance:   Fair, requires rest breaks, and on initially 5L > 2L O2    After treatment patient left in no apparent distress:    Supine in bed, Call bell within reach, and RN at bedside (for ECHO)    COMMUNICATION/EDUCATION:   The patients plan of care was discussed with: Physical therapist, Occupational therapist, and Registered nurse. Patient/family have participated as able in goal setting and plan of care. This patients plan of care is appropriate for delegation to [facility].     Thank you for this referral.  Bo Wright OT  Time Calculation: 18 mins

## 2022-12-13 NOTE — PROGRESS NOTES
Problem: Mobility Impaired (Adult and Pediatric)  Goal: *Acute Goals and Plan of Care (Insert Text)  Description: FUNCTIONAL STATUS PRIOR TO ADMISSION: Patient was independent and active without use of DME.    HOME SUPPORT PRIOR TO ADMISSION: The patient lived with wife but did not require assist.    Physical Therapy Goals  Initiated 12/13/2022  1. Patient will move from supine to sit and sit to supine  in bed with independence within 7 day(s). 2.  Patient will transfer from bed to chair and chair to bed with independence using the least restrictive device within 7 day(s). 3.  Patient will perform sit to stand with independence within 7 day(s). 4.  Patient will ambulate with independence for 150 feet with the least restrictive device within 7 day(s). 5.  Patient will ascend/descend 12 stairs with left handrail(s) with independence within 7 day(s). Outcome: Progressing Towards Goal   PHYSICAL THERAPY EVALUATION  Patient: Aldo Echeverria (76 y.o. male)  Date: 12/13/2022  Primary Diagnosis: CHF (congestive heart failure) ([facility] [address] [I50.9]       Precautions:   Fall      ASSESSMENT  Based on the objective data described below, the patient presents with decreased activity tolerance, new need for supplemental O2, and moderate risk for falls in setting of hospital admission for SOB. Noted cardiology note recommending medical management of HFrEF, CAD, and elevated troponin, with stated no need to continue to trend troponin. Patient cleared for OOB with PT by RN. Patient found supine in bed with spo2 100% on 7LPM. Over course of session, patient weaned to 2LPM with Spo2 100% while ambulating. Due to need for BiPAP this morning, patient not fully weaned to RA, and left on 2LPM at end of session. Patient overall supervision - SBA level for all mobility, ambulating 80 feet with slow but steady speed. HR from 111-121 with activity. Noted staff member present in patient's room upon return for patient to undergo echo. Further mobility discontinued and patient returned to supine for echo. Anticipate patient able to discharge to home with [facility] PT in 1-2 more PT treatment sessions pending weaning to RA, progression of gait, and participation in stair training. Current Level of Function Impacting Discharge (mobility/balance): ambulates 80 feet with CGA - SBA     Functional Outcome Measure: The patient scored Total Score: 25/28 on the Tinetti outcome measure which is indicative of moderate fall risk. Other factors to consider for discharge: weaned to 2LPM at 100% while ambulating      Patient will benefit from skilled therapy intervention to address the above noted impairments. PLAN :  Recommendations and Planned Interventions: bed mobility training, transfer training, gait training, therapeutic exercises, neuromuscular re-education, patient and family training/education, and therapeutic activities      Frequency/Duration: Patient will be followed by physical therapy:  3 times a week to address goals. Recommendation for discharge: (in order for the patient to meet his/her long term goals)  Physical therapy at least 2 days/week in the home     This discharge recommendation:  A follow-up discussion with the attending provider and/or case management is planned    IF patient discharges home will need the following DME: none         SUBJECTIVE:   Patient stated I am feeling good.     OBJECTIVE DATA SUMMARY:   HISTORY:    Past Medical History:   Diagnosis Date    CAD (coronary artery disease) 11/10/2016    NSTEMI & 2 stents    Deafness 10/28/2012    DM (diabetes mellitus) ([facility] [address]     Elevated cholesterol     Hypertension     NSTEMI (non-ST elevated myocardial infarction) ([facility] [address] 11/10/2016     Past Surgical History:   Procedure Laterality Date    COLONOSCOPY N/A 6/28/2018    COLONOSCOPY performed by Jeremi Ramey MD at [address] [place] [address] St  11/11/2016    2 stents       Personal factors and/or comorbidities impacting plan of care: HFrEF, CAD    Home Situation  Home Environment: Private residence  # Steps to Enter: 4  Rails to Enter: Yes  Hand Rails : Right  One/Two Story Residence: Two story  # of Interior Steps: 12  Interior Rails: Left  Living Alone: No  Support Systems: Spouse/Significant Other  Patient Expects to be Discharged to[de-identified] Home with family assistance  Current DME Used/Available at Home: None  Tub or Shower Type: Shower    EXAMINATION/PRESENTATION/DECISION MAKING:   Critical Behavior:  Neurologic State: Alert  Orientation Level: Oriented X4  Cognition: Appropriate decision making  Safety/Judgement: Fall prevention, Home safety  Hearing:     Skin:  intact  Edema: mild edema and redness noted across bridge of nose - RN aware   Range Of Motion:  AROM: Within functional limits                       Strength:    Strength: Generally decreased, functional                    Tone & Sensation:   Tone: Normal              Sensation: Intact               Coordination:  Coordination: Within functional limits  Vision:      Functional Mobility:  Bed Mobility:  Rolling: Supervision  Supine to Sit: Supervision  Sit to Supine: Supervision  Scooting: Supervision  Transfers:  Sit to Stand: Stand-by assistance  Stand to Sit: Stand-by assistance                       Balance:   Sitting: Intact; Without support  Standing: Intact; Without support  Ambulation/Gait Training:  Distance (ft): 80 Feet (ft)  Assistive Device: Gait belt  Ambulation - Level of Assistance: Contact guard assistance;Stand-by assistance        Gait Abnormalities: Altered arm swing;Decreased step clearance        Base of Support: Narrowed     Speed/Bette: Pace decreased (<100 feet/min)  Step Length: Right shortened;Left shortened                 Functional Measure:  Tinetti test:    Sitting Balance: 1  Arises: 1  Attempts to Rise: 2  Immediate Standing Balance: 2  Standing Balance: 2  Nudged: 2  Eyes Closed: 1  Turn 360 Degrees - Continuous/Discontinuous: 1  Turn 360 Degrees - Steady/Unsteady: 1  Sitting Down: 1  Balance Score: 14 Balance total score  Indication of Gait: 1  R Step Length/Height: 1  L Step Length/Height: 1  R Foot Clearance: 1  L Foot Clearance: 1  Step Symmetry: 1  Step Continuity: 1  Path: 2  Trunk: 2  Walking Time: 0  Gait Score: 11 Gait total score  Total Score: 25/28 Overall total score         Tinetti Tool Score Risk of Falls  <19 = High Fall Risk  19-24 = Moderate Fall Risk  25-28 = Low Fall Risk  Tinetti ME. Performance-Oriented Assessment of Mobility Problems in Elderly Patients. [facility] 66; [id number]. (Scoring Description: PT Bulletin Feb. 10, 1993)    Older adults: Raza Killian et al, 2009; n = 1000 [facility] elderly evaluated with ABC, CROW, ADL, and IADL)  · Mean CROW score for males aged 69-68 years = 26.21(3.40)  · Mean CROW score for females age 69-68 years = 25.16(4.30)  · Mean CROW score for males over 80 years = 23.29(6.02)  · Mean CROW score for females over 80 years = 17.20(8.32)        Physical Therapy Evaluation Charge Determination   History Examination Presentation Decision-Making   MEDIUM  Complexity : 1-2 comorbidities / personal factors will impact the outcome/ POC  LOW Complexity : 1-2 Standardized tests and measures addressing body structure, function, activity limitation and / or participation in recreation  LOW Complexity : Stable, uncomplicated  Other outcome measures tinetti  MEDIUM      Based on the above components, the patient evaluation is determined to be of the following complexity level: LOW     Pain Ratin/10    Activity Tolerance:   Good      After treatment patient left in no apparent distress:   Supine in bed and Call bell within reach    COMMUNICATION/EDUCATION:   The patients plan of care was discussed with: Occupational therapist and Registered nurse. Fall prevention education was provided and the patient/caregiver indicated understanding.     Thank you for this referral.  Lokesh De Jesus Karlie Wu, PT   Time Calculation: 18 mins

## 2022-12-13 NOTE — PROGRESS NOTES
[facility]  Cardiology Care Note                  []Initial visit     [x]Established visit     Patient Name: Solis FLEMING:[id number]  Primary Cardiologist: Nathalie Miller MD Consulting Cardiologist: Samantha Clark MD     Reason for initial visit:  dyspnea, decompensated HF, tachycardia. HPI:   71 y.o. male with PMH of PAD (s/p Rt mid SFA angioplasty 12/6/22). CABG, NSTEMI, stent to CX in DDR(2302), PCI 2/28/22OM2 into cx into distal left main, cardiomyopathy with EF /27/22 and mild AS. Subsequent Nuclear stress test October 27, 2022 showed ischemia similar to 3/15/2021 with a medium size defect in the mid to distal inferolateral and anterolateral segments in the circumflex territory he had septal dyskinesia with an EF of 39%. He had a fixed apical infarct/.  Echocardiogram October 27, 2022 showed an EF of 45 to 50% TAPSE at 1.2,reduced RV function, AV sclerosis of the aortic valve with mild AS (valve area of mean of 7 mmHg and HUY of 1.4 cm². Mild MR. EF further reduced 12/8/2022  (EF 35-40%). Subsequently had repeat LHC with results below:LHC: 12/06/2022  1. Normal LVEDP 2. Severe native multivessel coronary artery disease  3. Patent LIMA to LAD and vein graft to distal RCA  4. Recurrent ISR in OM1 stent with now 60 to 70% restenosis  5. Recoil of left main and circumflex stent with now recurrent 40 to 50% stenosis. 6.  Progression of ostial left main disease now to about 60% stenosis  7. Progression of disease in jailed first marginal branch now with diffuse 90% stenosis  8. High-grade stenosis in the mid to distal right potential femoral artery treated with 6 x 40 mm impact drug-coated balloon angioplasty to reduce the stenosis to less than 40%  Continued GDMT for CAD and CM, plavix based DAPT were recommended.   Diffuse nature of disease and relatively small size of his arteries made medical management is the best possible option for his coronary artery disease. Addendum by: Fabio Damian MD on 12/9/2022 10:17 AM. Thus pt was discharged 12/8/22. Represented 12/11/22 with chief c/o SOB and chest discomfort. Interval HPI/SUBJECTIVE:  TODAY, Pt reports improvement in dyspnea. Tachycardia improved. He is not on BiPAP anymore. Assessment and Plan       Cardiomyopathy/Acute on chronic HFrEF:   NYHA IV on admission. EF 35-40% with anterior wall hypokinesis, severe apical hypok. Bedside EF in er reportedly about 20%, Will repeat limited echo to eval LV function and MR. . SOB now significantly improved, off bipap on NC at 7 LNC- Wean as able. Will not resume Entresto (hyperkalemia, lower BP, renal dysfunction). Discussed with Dr. Ran Shaikh-  Continue IV lasix for now- watch renal function. Was not on BB due to soft bp/hypotension last admission. Stop amiodarone (avoid as able with cHF)- Dr. Ran Shaikh suspects his sinus tachycardia is compensatory. No corlonor given CAD history. Possibility of nonischemic cardiomyopathy with regional wall motion normalities not conforming to area of ischemic distribution. This could potentially reflect possible stress cardiomyopathy. QTC is also significantly prolonged consistent with stress cardiomyopathy. Alternatively other causes of nonischemic cardiomyopathy including subacute myocarditis may be present contributing to his recent deterioration. Does not require amiodarone for tachycardia. May cautiously use Corlanor if needed. 2  CAD:  Prior CABG in 2018, cath 9/8/2021 open LIMA, PCI to OM 2 all the way up to the left main 2/28/2022. Cath 12/6/2022 showed open LIMA and RCA graft some in-stent restenosis in the long graft from the left main to OM 2 but not severe and the OM1 was more severely diseased in the small vessel. Given small size of arteries and diffuse nature of disease, medical management best option.   Continue ASA, Plavix, statin. 3.  Elevated troponin:  1559. HS troponin trending down from last admission. No chest pain- Dr. Yvette Coello does not feel this represents new NSTEMI and no further HS troponins needed    4. Aortic stenosis, mild mean gradient of 7 nn Hg, HUY 1.4 by echo 10/27/2022     5. Mitral regurgitation: moderate by echo 10/27/22. Will repeat limited echo to evaluate. 5. Diabetes type II: per hospitalist.     6  History of hypertension: hypotensive overnight. BP now soft off entresto. 7. TANNER on CKD:  creatinine 2.11. suspect hypotension contributed. Monitor closely. Remain off entresto indefinitely. 8  XOL: on crestor 20 mg q HS. 9.  Hyperkalemia: K=5.9. Recommend Repeat potassium this afternoon. Remain off entresto. Discussed prognosis with wife. He seems to be getting better at this point of time. We will watch creatinine. Expect creatinine to improve as it might be hypotension driven TANNER. Nonetheless we will have to be cautious with diuretics. Recommend not to reinitiate Entresto given significant hypotension from Entresto and hyperkalemia.           ____________________________________________________________    Cardiac testing  12/05/22    ECHO ADULT FOLLOW-UP OR LIMITED 12/09/2022 12/9/2022    Interpretation Summary    Left Ventricle: Moderately reduced left ventricular systolic function with a visually estimated EF of 35 - 40%. Severe hypokinesis of the following segments: mid anteroseptal, apical anterior, apical septal, apical inferior and apical lateral. Severe hypokinesis of the apex. Mitral Valve: Severely thickened leaflet, at the anterior and posterior leaflets. Severely calcified leaflet, at the anterior and posterior leaflets. Mild annular calcification of the mitral valve. Moderate regurgitation. Left Atrium: Left atrium is mildly dilated. Contrast used: Definity.     limited study    Signed by: Prateek Tang MD on 12/9/2022  5:48 PM 10/27/22    ECHO ADULT COMPLETE 10/27/2022 10/27/2022    Interpretation Summary    Left Ventricle: Mildly reduced left ventricular systolic function with a visually estimated EF of 45 - 50%. Left ventricle size is normal. Normal wall thickness. Normal wall motion. Abnormal diastolic function. Right Ventricle: Reduced systolic function. TAPSE is 1.2 cm. Aortic Valve: Valve structure is normal. Mild sclerosis of the aortic valve cusp. Mild annular calcification. Mild regurgitation. Mild stenosis of the aortic valve. AV mean gradient is 7 mmHg. AV peak gradient is 13 mmHg. LVOT:AV VTI Index is 0.55. AV area by peak velocity is 1.4 cm2. Mitral Valve: Moderately thickened leaflet. Mild annular calcification of the mitral valve. The posterior leaflet of the mitral valve has restricted mobility. Mild regurgitation. Left Atrium: Left atrium is dilated. Signed by: Angel Coates MD on 10/27/2022  4:49 PM        NUCLEAR CARDIAC STRESS TEST 10/27/2022 11/1/2022  Interpretation Summary    Nuclear Findings: The study is positive for myocardial ischemia. The defect appears to be ischemia. The areas of ischemia are similar to 3/15/2021 study. Nuclear Findings: There is a moderate severity left ventricular stress perfusion defect that is medium in size present in the mid to distal inferolateral and anterolateral segment(s) that is reversible. The defect is consistent with abnormal perfusion in the LCx territory. There is normal wall motion in the defect area. The defect appears to be probable ischemia. There is a moderate severity second left ventricular stress perfusion defect. The defect appears to be infarction. Nuclear Findings: Abnormal left ventricular systolic function post-stress. Septal dyskinesis. Post-stress ejection fraction is 39%. ECG: Resting ECG demonstrates normal sinus rhythm. ECG: Stress ECG was negative for ischemia.     Stress Test: A pharmacological stress test was performed using lexiscan. Blood pressure demonstrated a normal response and heart rate demonstrated a normal response to stress. The patient's heart rate recovery was normal. The patient reported no symptoms during the stress test.    Signed by: Sophia Ovalle MD on 10/27/2022  4:45 PM    02/28/22    CARDIAC PROCEDURE 02/28/2022 2/28/2022    Conclusion  Findings  1. Severe native multivessel coronary disease  2. Patent LIMA to LAD, vein graft to distal RCA with severe disease in the native vessels  3. Occluded vein graft to OM 2. Native OM 2 severely diseased along with proximal to mid circumflex as well as distal left main. Overall the vessel is significantly remodeled negatively. Additionally there is significant disease in the first marginal branch which is even smaller as well as AV groove branch right at the takeoff of OM2. Single stent 2.25 x 28 mm Xience was placed extending from the OM 2 into the circumflex into the distal left main and sequentially dilated with 2.25 noncompliant, 2 5 noncompliant, 3 oh noncompliant and 3 5 noncompliant to perform multilevel POT to manage multiple bifurcations on the way. Atherectomy was considered but given small size of the vessels, was not deemed to be absolutely safe for the obtuse marginal lesion. Overall stent expanded fairly well related to the size of the vessels. 4.  Normal LVEDP    Access right radial: Small vessel, tortuous} brachiocephalic  Contrast 65 cc    Recommendations  1. Plavix based dual antiplatelet therapy  2. Guideline directed medical therapy for secondary prevention of coronary events    Signed by: Pau Jones MD on 2/28/2022  4:22 PM    Additional cardiac history:    CAD with CABG 6/9/2018. NSTEMI in November 2016 and resolved pulmonary HTN. Stent to circumflex an LAD in 2016. Stress echo in 2018 with a drop in BP with exercise and a drop in EF.    Cath on 6/22/18 that showed even with a 5F catheter, he dampened with suspected ostial 3 vessel disease. Echo 3/27/2021 = EF 55 to 60% mild AR MR TR LAE MAC  Nuclear 3/18/2021 EF 64% medium size defect apical and inferior lateral reversible ischemia medium defect mid and inferolateral nonreversible appear to be infarction intermediate risk stress test .  PCI 02/28/2022--patent LIMA to LAD, vein graft to distal RCA with severe disease in the native vessels Occluded vein graft to OM 2. Native OM 2 severely diseased along with proximal to mid circumflex as well as distal left main. Overall the vessel is significantly remodeled negatively. Additionally there is significant disease in the first marginal branch which is even smaller as well as AV groove branch right at the takeoff of OM2. Single stent 2.25 x 28 mm Xience was placed extending from the OM 2 into the circumflex into the distal left main and sequentially dilated with 2.25 noncompliant, 2 5 noncompliant, 3 oh noncompliant and 3 5 noncompliant to perform multilevel POT to manage multiple bifurcations on the way. Atherectomy was considered but given small size of the vessels, was not deemed to be absolutely safe for the obtuse marginal lesion. Overall stent expanded fairly well related to the size of the vessels. Normal LVEDP  Nuclear stress October 27, 2022 test showed ischemia similar to 3/15/2021 with a medium size defect in the mid to distal inferolateral and anterolateral segments in the circumflex territory he had septal dyskinesia with an EF of 39%. He had a fixed apical infarct  Echocardiogram October 27, 2022 showed an EF of 45 to 50% TAPSE at 1.2 showing reduced RV function sclerosis of the aortic valve with stenosis that was very mild with a valve area of mean of 7 mmHg and HUY of 1.4 cm². The mitral was thickened with restricted mobility and mild MR.       Most recent HS troponins:  Recent Labs     12/11/22  2308   TROPHS 1,559*       Review of Systems    [x]All other systems reviewed and all negative except as written in HPI    [] Patient unable to provide secondary to condition         Past Medical History:   Diagnosis Date    CAD (coronary artery disease) 11/10/2016    NSTEMI & 2 stents    Deafness 10/28/2012    DM (diabetes mellitus) ([facility] [address]     Elevated cholesterol     Hypertension     NSTEMI (non-ST elevated myocardial infarction) ([facility] [address] 11/10/2016     Past Surgical History:   Procedure Laterality Date    COLONOSCOPY N/A 6/28/2018    COLONOSCOPY performed by Ga Porter MD at [address]  11/11/2016    2 stents     Social Hx:  reports that he has never smoked. He has never been exposed to tobacco smoke. He has never used smokeless tobacco. He reports current alcohol use of about 2.0 standard drinks per week. He reports that he does not use drugs. Family Hx: family history includes Elevated Lipids in his brother, brother, and brother; Heart Attack in his father; Heart Disease in his father; Hypertension in his mother; No Known Problems in his daughter, sister, and son.   No Known Allergies       OBJECTIVE:  Wt Readings from Last 3 Encounters:   12/11/22 61.2 kg (135 lb)   12/05/22 59 kg (130 lb)   12/05/22 59.1 kg (130 lb 3.2 oz)       Intake/Output Summary (Last 24 hours) at 12/13/2022 1123  Last data filed at 12/13/2022 1025  Gross per 24 hour   Intake 526.66 ml   Output 2660 ml   Net -2133.34 ml           Physical Exam    Vitals:   Vitals:    12/13/22 0402 12/13/22 0552 12/13/22 0705 12/13/22 1000   BP:   (!) 115/54    Pulse: (!) 107 (!) 113 (!) 109 (!) 111   Resp:   25    Temp:   97.8 °F (36.6 °C)    SpO2:   100%    Weight:       Height:         Telemetry: normal sinus rhythm    BP (!) 110/43 (BP 1 Location: Left upper arm, BP Patient Position: At rest;Sitting)   Pulse (!) 116   Temp 97.7 °F (36.5 °C)   Resp 23   Ht 5' 9\" (1.753 m)   Wt 115 lb 1.3 oz (52.2 kg)   SpO2 100%   BMI 16.99 kg/m²   General:    Alert, cooperative, no distress. Psychiatric:    Normal Mood and affect    Eye/ENT:      Pupils equal, No asymmetry, Conjunctival pink. Able to hear voice at normal amplitude   Lungs:      Visibly symmetric chest expansion, No palpable tenderness. Bibasilar crackles which are improved compared to yesterday. Heart[de-identified]    Regular rate and rhythm, S1, S2 normal, no murmur, click, rub or gallop. No JVD, Normal palpable peripheral pulses. No cyanosis   Abdomen:     Soft, non-tender. Bowel sounds normal. No masses,  No      organomegaly. Extremities:   Extremities normal, atraumatic, no edema. Neurologic:   CN II-XII grossly intact. No gross focal deficits           Data Review:     Radiology:   XR Results (most recent):  Results from Hospital Encounter encounter on 12/11/22    XR CHEST PORT    Narrative  EXAM:  XR CHEST PORT    INDICATION: Chest pain and shortness of breath    COMPARISON: 12/5/2022    TECHNIQUE: Portable AP upright chest view at 2315 hours    FINDINGS: The cardiomediastinal contours are stable. There are increased diffuse interstitial and patchy airspace opacities. There  are small pleural effusions. There is no pneumothorax. The bones and upper  abdomen are stable. Impression  Increased diffuse interstitial and patchy airspace opacities can be seen with  pulmonary edema or infection. Small pleural effusions. CT Results (most recent):  Results from Hospital Encounter encounter on 12/11/22    CT CHEST WO CONT    Narrative  EXAM:  CT CHEST WO CONT    INDICATION:  Shortness of breath    COMPARISON: Radiograph 12/11/2022. CT 12/5/2022. TECHNIQUE: Helical CT of the chest is performed without intravenous contrast.  Coronal and sagittal reformatted images are obtained. CT dose reduction was  achieved through use of a standardized protocol tailored for this examination  and automatic exposure control for dose modulation.  Adaptive statistical  iterative reconstruction (ASIR) was utilized. FINDINGS:  The visualized thyroid gland is unremarkable. The aorta and main pulmonary  artery are stable in caliber. CABG surgical changes are noted. The heart size is  stable. There is no pericardial effusion. There are no enlarged axillary, mediastinal, or hilar lymph nodes. There is slightly increased small to moderate pleural effusions. Diffuse  interlobular septal thickening with patchy bilateral lung opacities is mildly  increased since 12/5/2022. There is cholelithiasis. The right adrenal nodule is unchanged. There is no  acute abnormality in the visualized upper abdomen. There are degenerative  changes in the spine. There is no aggressive bony lesion. Impression  1. Slightly increased small to moderate pleural effusions. 2. Overall mild increase diffuse interlobular septal thickening and patchy  bilateral lung opacities in keeping with pulmonary edema with superimposed  infection/inflammation not excluded. Follow up to resolution is suggested. MRI Results (most recent):  Results from [place] encounter on 05/22/16    MRI LUMB SPINE WO CONT    Narrative  **Final Report**      ICD Codes / Adm. Diagnosis: 724.2  M54.5 / Lumbago  Low back pain  Examination:  MR Darrell Lopez CON  - [id number] - May 22 2016  1:45PM  Accession No:  [id number]  Reason:  Low back pain      REPORT:  Indication: Pain and back extends into right leg to foot    Exam: MRI of the lumbar spine. Sequences include sagittal and axial T1 and  T2-weighted images. Sagittal STIR. Comparisons: April 27, 2016    Contrast: None    Findings: Alignment is normal. There is no evidence of marrow replacement or  acute fracture. Cord terminus is within normal limits. Paraspinous soft  tissues are within normal limits.     T12-L1: No stenosis    L1-L2: There is desiccation of this disc with a small bulge but no stenosis    L2-L3: There is desiccation of this disc with a small bulge but no stenosis    L3-L4: There is mild left facet arthropathy but no stenosis    L4-L5: There is disc height loss with a small disc bulge. Facet arthropathy. No stenosis    L5-S1: There is disc height loss with a small disc bulge and left  paracentral disc extrusion extending inferiorly. This mildly distorts the  left S1 nerve root in the subarticular zone and left lateral recess. There  are facet degenerative changes with moderate left and mild-to-moderate right  foraminal narrowing    Impression  :  1. Multilevel degenerative change detailed by level above most significant  at L5-S1                Signing/Reading Doctor: Berna Arechiga ([id number])  Approved: Berna Arechiga ([id number])  May 23 2016  8:39AM        No results for input(s): CPK, TROIQ in the last 72 hours. No lab exists for component: CKQMB, CPKMB, BMPP  Recent Labs     12/13/22  0303 12/12/22  0333    136   K 5.9* 5.2*   * 110*   CO2 21 19*   BUN 40* 32*   CREA 2.11* 1.86*   * 235*   CA 9.1 8.5     Recent Labs     12/13/22  0303 12/12/22  1840   WBC 7.9 9.3   HGB 10.2* 8.3*   HCT 34.3* 28.9*    257     Recent Labs     12/11/22  2308   AP 85     No results for input(s): CHOL, LDLC in the last 72 hours. No lab exists for component: TGL, HDLC,  HBA1C  No results for input(s): CRP, TSH, TSHEXT, TSHEXT in the last 72 hours.     No lab exists for component: ESR          Current meds:    Current Facility-Administered Medications:     sodium chloride (NS) flush 5-40 mL, 5-40 mL, IntraVENous, Q8H, Heber Barajas MD, 10 mL at 12/12/22 2344    sodium chloride (NS) flush 5-40 mL, 5-40 mL, IntraVENous, PRN, Heber Barajas MD    acetaminophen (TYLENOL) tablet 650 mg, 650 mg, Oral, Q6H PRN **OR** acetaminophen (TYLENOL) suppository 650 mg, 650 mg, Rectal, Q6H PRN, Heber Barajas MD    polyethylene glycol (MIRALAX) packet 17 g, 17 g, Oral, DAILY PRN, Heber Barajas MD    ondansetron (ZOFRAN ODT) tablet 4 mg, 4 mg, Oral, Q8H PRN **OR** ondansetron (ZOFRAN) injection 4 mg, 4 mg, IntraVENous, Q6H PRN, Heber Barajas MD    enoxaparin (LOVENOX) injection 40 mg, 40 mg, SubCUTAneous, DAILY, Heber Barajas MD, 40 mg at 12/13/22 0827    furosemide (LASIX) injection 40 mg, 40 mg, IntraVENous, BID, Heber Barajas MD, 40 mg at 12/13/22 0827    aspirin delayed-release tablet 81 mg, 81 mg, Oral, DAILY, Heber Barajas MD, 81 mg at 12/13/22 0827    clopidogreL (PLAVIX) tablet 75 mg, 75 mg, Oral, DAILY, Heber Barajas MD, 75 mg at 12/13/22 0827    rosuvastatin (CRESTOR) tablet 20 mg, 20 mg, Oral, QHS, Heber Barajas MD    [Held by provider] sacubitriL-valsartan (ENTRESTO) 24-26 mg tablet 1 Tablet, 1 Tablet, Oral, BID, Heber Barajas MD    tamsulosin (FLOMAX) capsule 0.4 mg, 0.4 mg, Oral, DAILY, Heber Barajas MD, 0.4 mg at 12/13/22 0827    melatonin tablet 3 mg, 3 mg, Oral, QHS PRN, Carlos Bai NP [facility].  JACOB Rodriguez  [facility] [address] [place] [address] 13, [address] [address]  [facility]  [phone]      Andre Hatfield MD

## 2022-12-13 NOTE — ED NOTES
Bedside shift change report given to Sue Middleton (oncoming nurse) by Devon North (offgoing nurse). Report included the following information SBAR, Kardex, ED Summary and MAR.

## 2022-12-14 LAB
ACC. NO. FROM MICRO ORDER, ACCP: NORMAL
ACINETOBACTER CALCOACETICUS-BAUMANII COMPLEX, ACBCX: NOT DETECTED
ANION GAP SERPL CALC-SCNC: 9 MMOL/L (ref 5–15)
BACTEROIDES FRAGILIS, BFRA: NOT DETECTED
BIOFIRE COMMENT, BCIDPF: NORMAL
BUN SERPL-MCNC: 43 MG/DL (ref 6–20)
BUN/CREAT SERPL: 23 (ref 12–20)
C GLABRATA DNA VAG QL NAA+PROBE: NOT DETECTED
CALCIUM SERPL-MCNC: 9.2 MG/DL (ref 8.5–10.1)
CANDIDA ALBICANS: NOT DETECTED
CANDIDA AURIS, CAAU: NOT DETECTED
CANDIDA KRUSEI, CKRP: NOT DETECTED
CANDIDA PARAPSILOSIS, CPAUP: NOT DETECTED
CANDIDA TROPICALIS, CTROP: NOT DETECTED
CHLORIDE SERPL-SCNC: 102 MMOL/L (ref 97–108)
CO2 SERPL-SCNC: 24 MMOL/L (ref 21–32)
CREAT SERPL-MCNC: 1.87 MG/DL (ref 0.7–1.3)
CRYPTO NEOFORMANS/GATTII, CRYNEG: NOT DETECTED
ECHO LV FRACTIONAL SHORTENING: 18 % (ref 28–44)
ECHO LV INTERNAL DIMENSION DIASTOLE INDEX: 2.57 CM/M2
ECHO LV INTERNAL DIMENSION DIASTOLIC: 4.5 CM (ref 4.2–5.9)
ECHO LV INTERNAL DIMENSION SYSTOLIC INDEX: 2.11 CM/M2
ECHO LV INTERNAL DIMENSION SYSTOLIC: 3.7 CM
ECHO LV IVSD: 0.8 CM (ref 0.6–1)
ECHO LV MASS 2D: 78.9 G (ref 88–224)
ECHO LV MASS INDEX 2D: 45.1 G/M2 (ref 49–115)
ECHO LV POSTERIOR WALL DIASTOLIC: 0.4 CM (ref 0.6–1)
ECHO LV RELATIVE WALL THICKNESS RATIO: 0.18
ECHO MV A VELOCITY: 1.46 M/S
ECHO MV A VELOCITY: 1.46 M/S
ECHO MV AREA PHT: 5.1 CM2
ECHO MV E DECELERATION TIME (DT): 147.6 MS
ECHO MV E VELOCITY: 0.92 M/S
ECHO MV E VELOCITY: 0.93 M/S
ECHO MV PRESSURE HALF TIME (PHT): 42.8 MS
ECHO MV REGURGITANT PEAK GRADIENT: 49 MMHG
ECHO MV REGURGITANT PEAK VELOCITY: 3.5 M/S
ENTEROBACTER CLOACAE COMPLEX, ECCP: NOT DETECTED
ENTEROBACTERALES SP. , ENBLS: NOT DETECTED
ENTEROCOCCUS FAECALIS, ENFA: NOT DETECTED
ENTEROCOCCUS FAECIUM, ENFAM: NOT DETECTED
ESCHERICHIA COLI: NOT DETECTED
GLUCOSE SERPL-MCNC: 197 MG/DL (ref 65–100)
HAEMOPHILUS INFLUENZAE, HMI: NOT DETECTED
KLEBSIELLA AEROGENES, KLAE: NOT DETECTED
KLEBSIELLA OXYTOCA: NOT DETECTED
KLEBSIELLA PNEUMONIAE GROUP, KPPG: NOT DETECTED
LISTERIA MONOCYTOGENES, LMONP: NOT DETECTED
NEISSERIA MENINGITIDIS, NMNI: NOT DETECTED
POTASSIUM SERPL-SCNC: 4.2 MMOL/L (ref 3.5–5.1)
PROTEUS, PRP: NOT DETECTED
PSEUDOMONAS AERUGINOSA: NOT DETECTED
RESISTANT GENE SPACE, REGENE: NORMAL
SALMONELLA, SALMO: NOT DETECTED
SERRATIA MARCESCENS: NOT DETECTED
SODIUM SERPL-SCNC: 135 MMOL/L (ref 136–145)
STAPH EPIDERMIDIS, STEP: NOT DETECTED
STAPH LUGDUNENSIS, STALUG: NOT DETECTED
STAPHYLOCOCCUS AUREUS: NOT DETECTED
STAPHYLOCOCCUS, STAPP: NOT DETECTED
STENO MALTOPHILIA, STMA: NOT DETECTED
STREPTOCOCCUS , STPSP: NOT DETECTED
STREPTOCOCCUS AGALACTIAE (GROUP B): NOT DETECTED
STREPTOCOCCUS PNEUMONIAE , SPNP: NOT DETECTED
STREPTOCOCCUS PYOGENES (GROUP A), SPYOP: NOT DETECTED

## 2022-12-14 PROCEDURE — 74011250637 HC RX REV CODE- 250/637: Performed by: STUDENT IN AN ORGANIZED HEALTH CARE EDUCATION/TRAINING PROGRAM

## 2022-12-14 PROCEDURE — 80048 BASIC METABOLIC PNL TOTAL CA: CPT

## 2022-12-14 PROCEDURE — 74011250637 HC RX REV CODE- 250/637: Performed by: HOSPITALIST

## 2022-12-14 PROCEDURE — 74011250637 HC RX REV CODE- 250/637: Performed by: NURSE PRACTITIONER

## 2022-12-14 PROCEDURE — 99233 SBSQ HOSP IP/OBS HIGH 50: CPT | Performed by: INTERNAL MEDICINE

## 2022-12-14 PROCEDURE — 74011250636 HC RX REV CODE- 250/636: Performed by: HOSPITALIST

## 2022-12-14 PROCEDURE — 65660000001 HC RM ICU INTERMED STEPDOWN

## 2022-12-14 PROCEDURE — 74011250636 HC RX REV CODE- 250/636: Performed by: STUDENT IN AN ORGANIZED HEALTH CARE EDUCATION/TRAINING PROGRAM

## 2022-12-14 PROCEDURE — 74011000250 HC RX REV CODE- 250: Performed by: STUDENT IN AN ORGANIZED HEALTH CARE EDUCATION/TRAINING PROGRAM

## 2022-12-14 PROCEDURE — 36415 COLL VENOUS BLD VENIPUNCTURE: CPT

## 2022-12-14 RX ORDER — ZOLPIDEM TARTRATE 5 MG/1
5 TABLET ORAL
Status: DISCONTINUED | OUTPATIENT
Start: 2022-12-14 | End: 2022-12-16 | Stop reason: HOSPADM

## 2022-12-14 RX ORDER — GUAIFENESIN 100 MG/5ML
100 SOLUTION ORAL
Status: DISCONTINUED | OUTPATIENT
Start: 2022-12-14 | End: 2022-12-16 | Stop reason: HOSPADM

## 2022-12-14 RX ADMIN — Medication 3 MG: at 21:15

## 2022-12-14 RX ADMIN — CLOPIDOGREL BISULFATE 75 MG: 75 TABLET ORAL at 09:17

## 2022-12-14 RX ADMIN — TAMSULOSIN HYDROCHLORIDE 0.4 MG: 0.4 CAPSULE ORAL at 09:17

## 2022-12-14 RX ADMIN — ROSUVASTATIN 20 MG: 10 TABLET, FILM COATED ORAL at 21:14

## 2022-12-14 RX ADMIN — GUAIFENESIN 100 MG: 200 SOLUTION ORAL at 18:47

## 2022-12-14 RX ADMIN — ASPIRIN 81 MG: 81 TABLET, COATED ORAL at 09:17

## 2022-12-14 RX ADMIN — Medication 10 ML: at 21:16

## 2022-12-14 RX ADMIN — ZOLPIDEM TARTRATE 5 MG: 5 TABLET ORAL at 21:15

## 2022-12-14 RX ADMIN — Medication 10 ML: at 17:34

## 2022-12-14 RX ADMIN — FUROSEMIDE 40 MG: 10 INJECTION, SOLUTION INTRAMUSCULAR; INTRAVENOUS at 17:32

## 2022-12-14 RX ADMIN — Medication 10 ML: at 06:22

## 2022-12-14 RX ADMIN — ENOXAPARIN SODIUM 30 MG: 100 INJECTION SUBCUTANEOUS at 09:16

## 2022-12-14 RX ADMIN — FUROSEMIDE 40 MG: 10 INJECTION, SOLUTION INTRAMUSCULAR; INTRAVENOUS at 09:16

## 2022-12-14 RX ADMIN — IVABRADINE 2.5 MG: 5 TABLET, FILM COATED ORAL at 17:31

## 2022-12-14 NOTE — PROGRESS NOTES
Physician Progress Note      PATIENTFadi Urban  CSN #:                  [id number]  :                       1950  ADMIT DATE:       2022 5:13 PM  DISCH DATE:        2022 5:26 PM  RESPONDING  PROVIDER #:        Jerri Cruz MD          QUERY TEXT:    Patient admitted with NSTEMI. Pt noted to have documented diabetes with elevated A1c requiring sliding scale insulin. Please document in progress notes and discharge summary further specificity regarding the control status of DM: The medical record reflects the following:  Risk Factors: hyperglycemia    Clinical Indicators:  H&P   Type 2 diabetes. 10/12/22 09:10  Hemoglobin A1c, (calculated): 7.0 (H)    POC Glucose 178  185  114  227  104  207  172      Treatment:  POC glucose monitoring, lispro sliding scale insulin    Thank you,  Reinaldo Ag RN [facility]  Clinical Documentation  [phone] or via Perfect Serve  Options provided:  -- Hyperglycemia due to DM type II  -- Hyperglycemia not related to DM type II  -- Other - I will add my own diagnosis  -- Disagree - Not applicable / Not valid  -- Disagree - Clinically unable to determine / Unknown  -- Refer to Clinical Documentation Reviewer    PROVIDER RESPONSE TEXT:    This patient has Hyperglycemia due to DM type II.     Query created by: Fiona Gonzalez on 2022 11:50 AM      Electronically signed by:  Jerri Cruz MD 2022 4:50 PM

## 2022-12-14 NOTE — PROGRESS NOTES
1930  Verbal bedside report given to Karlis Prader, LORI oncoming nurse by Georgia khanna. Bang Diaz RN off-going nurse. Report included current pt status and condition, recent results, hx of present illness, heart rate and rhythm (ST w/PVC), and respiratory status. 1600  Got up and ambulated to restroom. Gait unsteady, but only needed 1 standby. 895 [address] ED to see if hearing aid was found. No hearing aid found, but they will call us if they find it. 1420  Removed donnelly. 0930  Pt awake and alert, interactive. Trouble sleeping, melatonin did not help, may need something stronger tonight. 0730  Verbal bedside report received from Wayne Warren RN off-going nurse by Georgia khanna. LORI Dixon oncoming nurse. Report included current pt status and condition, recent results, hx of present illness, heart rate and rhythm (ST), and respiratory status. Greeted pt and enquired about present needs and goals for the day.

## 2022-12-14 NOTE — PROGRESS NOTES
[facility]  Cardiology Care Note                  [x]Initial visit     []Established visit     Patient Name: Allison Olea NMA:[id number]  Primary Cardiologist: Gaurang Lobo MD  Consulting Cardiologist: Bertha Petty MD     Reason for initial visit:  dyspnea, decompensated HF, tachycardia. Patient seen and examined by me with the above nurse practitioner. I personally performed all components of the history, physical, and medical decision making and agree with the assessment and plan with minor modifications as noted. Today the patient is feeling much better after diuresis, with creatinine stable. Remains tachycardic without enough blood pressure to add beta-blocker. General PE  Gen:  NAD  Mental Status - Alert. General Appearance - Not in acute distress. HEENT:  PERRL, no carotid bruits or JVD  Chest and Lung Exam   Inspection: Accessory muscles - No use of accessory muscles in breathing. Auscultation:   Breath sounds: - Normal.   Cardiovascular   Inspection: Jugular vein - Bilateral - Inspection Normal.   Palpation/Percussion:   Apical Impulse: - Normal.   Auscultation: Rhythm - Regular. Heart Sounds - S1 WNL and S2 WNL. No S3 or S4. Murmurs & Other Heart Sounds: Auscultation of the heart reveals - No Murmurs. Peripheral Vascular   Upper Extremity: Inspection - Bilateral - No Cyanotic nailbeds or Digital clubbing. Lower Extremity:   Palpation: Edema - Bilateral - No edema. Abdomen:   Soft, non-tender, bowel sounds are active. Neuro: A&O times 3, CN and motor grossly WNL    We will add Corlanor to try to help with elevated heart rate. Blood pressure too low for beta-blocker and other guideline directed medical therapy. Continue treatment of CAD, hyperlipidemia which is under relatively good control, and diabetes. HPI:     CAD with CABG 6/9/2018.    NSTEMI in November 2016 and resolved pulmonary HTN. Stent to circumflex an LAD in 2016. Stress echo in 2018 with a drop in BP with exercise and a drop in EF. Cath on 6/22/18 that showed even with a 5F catheter, he dampened with suspected ostial 3 vessel disease. Echo 3/27/2021 = EF 55 to 60% mild AR MR TR LAE MAC  Nuclear 3/18/2021 EF 64% medium size defect apical and inferior lateral reversible ischemia medium defect mid and inferolateral nonreversible appear to be infarction intermediate risk stress test .  PCI 02/28/2022--patent LIMA to LAD, vein graft to distal RCA with severe disease in the native vessels Occluded vein graft to OM 2. Native OM 2 severely diseased along with proximal to mid circumflex as well as distal left main. Overall the vessel is significantly remodeled negatively. Additionally there is significant disease in the first marginal branch which is even smaller as well as AV groove branch right at the takeoff of OM2. Single stent 2.25 x 28 mm Xience was placed extending from the OM 2 into the circumflex into the distal left main and sequentially dilated with 2.25 noncompliant, 2 5 noncompliant, 3 oh noncompliant and 3 5 noncompliant to perform multilevel POT to manage multiple bifurcations on the way. Atherectomy was considered but given small size of the vessels, was not deemed to be absolutely safe for the obtuse marginal lesion. Overall stent expanded fairly well related to the size of the vessels. Normal LVEDP  Nuclear stress October 27, 2022 test showed ischemia similar to 3/15/2021 with a medium size defect in the mid to distal inferolateral and anterolateral segments in the circumflex territory he had septal dyskinesia with an EF of 39%. He had a fixed apical infarct  Echocardiogram October 27, 2022 showed an EF of 45 to 50% TAPSE at 1.2 showing reduced RV function sclerosis of the aortic valve with stenosis that was very mild with a valve area of mean of 7 mmHg and HUY of 1.4 cm². The mitral was thickened with restricted mobility and mild MR.      SUBJECTIVE:    Pt  off bipap, sats 90s on RA. Feels better than he did at last hosp discharge. Assessment and Plan       Dyspnea/decompensated HFrEF- tachycardic,Chest CT with Overall mild increase diffuse interlobular septal thickening and patchy bilateral lung opacities in keeping with pulmonary edema with superimposed infection/inflammation not excluded. Diurese with IV lasix. Unlikely to be a candidate for entresto given severe hypotension and hyperkalemia. Hold BB since BP still marginal and this may be compensatory response. Overall better. Will try corlanor. 2  CAD native vessel with unstable angina and NSTEMI  Prior CABG in 2018, cath 9/8/2021 open LIMA, PCI to OM 2 all the way up to the left main 2/28/2022. Cath 12/6/2022 showed open LIMA and RCA graft some in-stent restenosis in the long graft from the left main to OM 2 but not severe and the OM1 was more severely diseased in the small vessel. Interventional cardiology felt no reasonable vessel to intervene and recommended medical management. The OM disease is consistent with a lateral wall ischemia demonstrated on prior nuclear stress test.  Continue aspirin and Plavix noted mild anemia-hemoglobin on admission (10.8)    3  Cardiomyopathy systolic heart failure  NYHA III with shortness of breath at short distance EF40-45%. Repeat echo LVEF 25 to 30% overall WMA on recent echo appears not to be in teritorry of ischemia suggesting alternative mechanism fo cardiomyopathy(stress SMP vs myocarditis). 4.  Aortic stenosis mild mean gradient of 7 mm Hg, HUY 1.4 by echo 11/27/2022 -mild , murmur     5.  Diabetes mellitus type 2 previously managed by hospitalist service now has a glucose of 377 patient is currently on antihyperglycemic's and will involve diabetes treatment center for assistance  On insulin lispro and alogliptin    6  Hypertension previously hypertensive now with low blood pressure  Will review meds and consider changes based on optimal medical therapy for congestive heart failure    7. 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 Likely secondary to hypotension and entresto. May cautiously continue diuretics. May have to hold tomorrow if creatinine continues to rise. 8 XOL  Statin intolerant-improved. Interesting that he had such progression of coronary disease despite being relatively low and weight not sedentary and having to low LDL on a high potency statin. If his LDL was higher I consider adding a PCSK9 but I cannot really recommend that given his LDL 41. At goal , denies excess muscle aches or new liver issues               ____________________________________________________________    Cardiac testing  12/11/22    ECHO ADULT FOLLOW-UP OR LIMITED 12/14/2022 12/14/2022    Interpretation Summary    Left Ventricle: Severely reduced left ventricular systolic function with a visually estimated EF of 25 - 30%. Not well visualized. Left ventricle size is normal. Normal wall thickness. Normal wall motion. Normal diastolic function. Mitral Valve: Thickened leaflet. Moderate annular calcification of the mitral valve. Mild regurgitation. Contrast used: Definity. Note: image quality is poor, and even with Definity contrast, EF is very difficult to estimate, and the reported figure is approximate at best. Consider alternative imaging modalities such as MUGA or cardiac MRI to more accurately assess. Signed by: Sunday Boles MD on 12/14/2022  4:46 PM        10/27/22    ECHO ADULT COMPLETE 10/27/2022 10/27/2022    Interpretation Summary    Left Ventricle: Mildly reduced left ventricular systolic function with a visually estimated EF of 45 - 50%. Left ventricle size is normal. Normal wall thickness. Normal wall motion. Abnormal diastolic function. Right Ventricle: Reduced systolic function. TAPSE is 1.2 cm.     Aortic Valve: Valve structure is normal. Mild sclerosis of the aortic valve cusp. Mild annular calcification. Mild regurgitation. Mild stenosis of the aortic valve. AV mean gradient is 7 mmHg. AV peak gradient is 13 mmHg. LVOT:AV VTI Index is 0.55. AV area by peak velocity is 1.4 cm2. Mitral Valve: Moderately thickened leaflet. Mild annular calcification of the mitral valve. The posterior leaflet of the mitral valve has restricted mobility. Mild regurgitation. Left Atrium: Left atrium is dilated. Signed by: Ericka Franklin MD on 10/27/2022  4:49 PM        10/27/22    NUCLEAR CARDIAC STRESS TEST 10/27/2022 11/1/2022    Interpretation Summary    Nuclear Findings: The study is positive for myocardial ischemia. The defect appears to be ischemia. The areas of ischemia are similar to 3/15/2021 study. Nuclear Findings: There is a moderate severity left ventricular stress perfusion defect that is medium in size present in the mid to distal inferolateral and anterolateral segment(s) that is reversible. The defect is consistent with abnormal perfusion in the LCx territory. There is normal wall motion in the defect area. The defect appears to be probable ischemia. There is a moderate severity second left ventricular stress perfusion defect. The defect appears to be infarction. Nuclear Findings: Abnormal left ventricular systolic function post-stress. Septal dyskinesis. Post-stress ejection fraction is 39%. ECG: Resting ECG demonstrates normal sinus rhythm. ECG: Stress ECG was negative for ischemia. Stress Test: A pharmacological stress test was performed using lexiscan. Blood pressure demonstrated a normal response and heart rate demonstrated a normal response to stress. The patient's heart rate recovery was normal. The patient reported no symptoms during the stress test.    Signed by: Ericka Franklin MD on 10/27/2022  4:45 PM    02/28/22    CARDIAC PROCEDURE 02/28/2022 2/28/2022    Conclusion  Findings  1.   Severe native multivessel coronary disease  2. Patent LIMA to LAD, vein graft to distal RCA with severe disease in the native vessels  3. Occluded vein graft to OM 2. Native OM 2 severely diseased along with proximal to mid circumflex as well as distal left main. Overall the vessel is significantly remodeled negatively. Additionally there is significant disease in the first marginal branch which is even smaller as well as AV groove branch right at the takeoff of OM2. Single stent 2.25 x 28 mm Xience was placed extending from the OM 2 into the circumflex into the distal left main and sequentially dilated with 2.25 noncompliant, 2 5 noncompliant, 3 oh noncompliant and 3 5 noncompliant to perform multilevel POT to manage multiple bifurcations on the way. Atherectomy was considered but given small size of the vessels, was not deemed to be absolutely safe for the obtuse marginal lesion. Overall stent expanded fairly well related to the size of the vessels. 4.  Normal LVEDP    Access right radial: Small vessel, tortuous} brachiocephalic  Contrast 65 cc    Recommendations  1. Plavix based dual antiplatelet therapy  2. Guideline directed medical therapy for secondary prevention of coronary events    Signed by:  Wendy Le MD on 2/28/2022  4:22 PM        Most recent HS troponins:  Recent Labs     12/11/22  2308   TROPHS 1,559*       Review of Systems    [x]All other systems reviewed and all negative except as written in HPI    [] Patient unable to provide secondary to condition         Past Medical History:   Diagnosis Date    CAD (coronary artery disease) 11/10/2016    NSTEMI & 2 stents    Deafness 10/28/2012    DM (diabetes mellitus) ([facility] [address]     Elevated cholesterol     Hypertension     NSTEMI (non-ST elevated myocardial infarction) ([facility] [address] 11/10/2016     Past Surgical History:   Procedure Laterality Date    COLONOSCOPY N/A 6/28/2018    COLONOSCOPY performed by Hailey Lozano MD at [address] [place] [address] CARDIAC SURG PROCEDURE UNLIST  11/11/2016    2 stents     Social Hx:  reports that he has never smoked. He has never been exposed to tobacco smoke. He has never used smokeless tobacco. He reports current alcohol use of about 2.0 standard drinks per week. He reports that he does not use drugs. Family Hx: family history includes Elevated Lipids in his brother, brother, and brother; Heart Attack in his father; Heart Disease in his father; Hypertension in his mother; No Known Problems in his daughter, sister, and son. No Known Allergies       OBJECTIVE:  Wt Readings from Last 3 Encounters:   12/14/22 115 lb 1.3 oz (52.2 kg)   12/05/22 130 lb (59 kg)   12/05/22 130 lb 3.2 oz (59.1 kg)       Intake/Output Summary (Last 24 hours) at 12/14/2022 1027  Last data filed at 12/14/2022 0920  Gross per 24 hour   Intake 150 ml   Output 3850 ml   Net -3700 ml           Physical Exam    Vitals:   Vitals:    12/14/22 0624 12/14/22 0703 12/14/22 0920 12/14/22 1000   BP:  (!) 118/54     Pulse:  (!) 106 (!) 109 (!) 112   Resp:  21 22    Temp: 97 °F (36.1 °C) 97 °F (36.1 °C)     SpO2:  100% 100%    Weight:       Height:         Telemetry: normal sinus rhythm    BP (!) 118/54 (BP 1 Location: Left upper arm, BP Patient Position: Lying)   Pulse (!) 112   Temp 97 °F (36.1 °C)   Resp 22   Ht 5' 9\" (1.753 m)   Wt 115 lb 1.3 oz (52.2 kg)   SpO2 100%   BMI 16.99 kg/m²   General:    Alert, cooperative, no distress. Psychiatric:    Normal Mood and affect    Eye/ENT:      Pupils equal, No asymmetry, Conjunctival pink. Able to hear voice at normal amplitude   Lungs:      Visibly symmetric chest expansion, No palpable tenderness. significant bibasilar crackles. Heart[de-identified]    Regular rate and rhythm, S1, S2 normal, no murmur, click, rub or gallop. No JVD, Normal palpable peripheral pulses. No cyanosis   Abdomen:     Soft, non-tender. Bowel sounds normal. No masses,  No      organomegaly.    Extremities:   Extremities normal, atraumatic, no edema. Neurologic:   CN II-XII grossly intact. No gross focal deficits           Data Review:     Radiology:   XR Results (most recent):  Results from Hospital Encounter encounter on 12/11/22    XR CHEST PORT    Narrative  EXAM:  XR CHEST PORT    INDICATION: Chest pain and shortness of breath    COMPARISON: 12/5/2022    TECHNIQUE: Portable AP upright chest view at 2315 hours    FINDINGS: The cardiomediastinal contours are stable. There are increased diffuse interstitial and patchy airspace opacities. There  are small pleural effusions. There is no pneumothorax. The bones and upper  abdomen are stable. Impression  Increased diffuse interstitial and patchy airspace opacities can be seen with  pulmonary edema or infection. Small pleural effusions. CT Results (most recent):  Results from Hospital Encounter encounter on 12/11/22    CT CHEST WO CONT    Narrative  EXAM:  CT CHEST WO CONT    INDICATION:  Shortness of breath    COMPARISON: Radiograph 12/11/2022. CT 12/5/2022. TECHNIQUE: Helical CT of the chest is performed without intravenous contrast.  Coronal and sagittal reformatted images are obtained. CT dose reduction was  achieved through use of a standardized protocol tailored for this examination  and automatic exposure control for dose modulation. Adaptive statistical  iterative reconstruction (ASIR) was utilized. FINDINGS:  The visualized thyroid gland is unremarkable. The aorta and main pulmonary  artery are stable in caliber. CABG surgical changes are noted. The heart size is  stable. There is no pericardial effusion. There are no enlarged axillary, mediastinal, or hilar lymph nodes. There is slightly increased small to moderate pleural effusions. Diffuse  interlobular septal thickening with patchy bilateral lung opacities is mildly  increased since 12/5/2022. There is cholelithiasis. The right adrenal nodule is unchanged. There is no  acute abnormality in the visualized upper abdomen. There are degenerative  changes in the spine. There is no aggressive bony lesion. Impression  1. Slightly increased small to moderate pleural effusions. 2. Overall mild increase diffuse interlobular septal thickening and patchy  bilateral lung opacities in keeping with pulmonary edema with superimposed  infection/inflammation not excluded. Follow up to resolution is suggested. MRI Results (most recent):  Results from [place] encounter on 05/22/16    MRI LUMB SPINE WO CONT    Narrative  **Final Report**      ICD Codes / Adm. Diagnosis: 724.2  M54.5 / Lumbago  Low back pain  Examination:  MR Lisa Mckinney CON  - [id number] - May 22 2016  1:45PM  Accession No:  [id number]  Reason:  Low back pain      REPORT:  Indication: Pain and back extends into right leg to foot    Exam: MRI of the lumbar spine. Sequences include sagittal and axial T1 and  T2-weighted images. Sagittal STIR. Comparisons: April 27, 2016    Contrast: None    Findings: Alignment is normal. There is no evidence of marrow replacement or  acute fracture. Cord terminus is within normal limits. Paraspinous soft  tissues are within normal limits. T12-L1: No stenosis    L1-L2: There is desiccation of this disc with a small bulge but no stenosis    L2-L3: There is desiccation of this disc with a small bulge but no stenosis    L3-L4: There is mild left facet arthropathy but no stenosis    L4-L5: There is disc height loss with a small disc bulge. Facet arthropathy. No stenosis    L5-S1: There is disc height loss with a small disc bulge and left  paracentral disc extrusion extending inferiorly. This mildly distorts the  left S1 nerve root in the subarticular zone and left lateral recess.  There  are facet degenerative changes with moderate left and mild-to-moderate right  foraminal narrowing    Impression  :  1. Multilevel degenerative change detailed by level above most significant  at L5-S1                Signing/Reading Doctor: Alicia Avelar ([id number])  Mandi Thakkar ([id number])  May 23 2016  8:39AM        No results for input(s): CPK, TROIQ in the last 72 hours. No lab exists for component: CKQMB, CPKMB, BMPP  Recent Labs     12/14/22  0042 12/13/22  1549 12/13/22  0303   *  --  139   K 4.2 4.8 5.9*     --  109*   CO2 24  --  21   BUN 43*  --  40*   CREA 1.87*  --  2.11*   *  --  200*   CA 9.2  --  9.1     Recent Labs     12/13/22  0303 12/12/22  1840   WBC 7.9 9.3   HGB 10.2* 8.3*   HCT 34.3* 28.9*    257     Recent Labs     12/11/22  2308   AP 85     No results for input(s): CHOL, LDLC in the last 72 hours. No lab exists for component: TGL, HDLC,  HBA1C  No results for input(s): CRP, TSH, TSHEXT, TSHEXT in the last 72 hours.     No lab exists for component: ESR          Current meds:    Current Facility-Administered Medications:     enoxaparin (LOVENOX) injection 30 mg, 30 mg, SubCUTAneous, DAILY, Shady Galaviz MD, 30 mg at 12/14/22 0916    sodium chloride (NS) flush 5-40 mL, 5-40 mL, IntraVENous, Q8H, Heber Barajas MD, 10 mL at 12/14/22 0622    sodium chloride (NS) flush 5-40 mL, 5-40 mL, IntraVENous, PRN, Heber Barajas MD    acetaminophen (TYLENOL) tablet 650 mg, 650 mg, Oral, Q6H PRN **OR** acetaminophen (TYLENOL) suppository 650 mg, 650 mg, Rectal, Q6H PRN, Heber Barajas MD    polyethylene glycol (MIRALAX) packet 17 g, 17 g, Oral, DAILY PRN, Heber Barajas MD    ondansetron (ZOFRAN ODT) tablet 4 mg, 4 mg, Oral, Q8H PRN **OR** ondansetron (ZOFRAN) injection 4 mg, 4 mg, IntraVENous, Q6H PRN, Heber Barajas MD    furosemide (LASIX) injection 40 mg, 40 mg, IntraVENous, BID, Heber Barajas MD, 40 mg at 12/14/22 0916    aspirin delayed-release tablet 81 mg, 81 mg, Oral, DAILY, Heber Barajas MD, 81 mg at 12/14/22 0917    clopidogreL (PLAVIX) tablet 75 mg, 75 mg, Oral, DAILY, Heber Barajas MD, 75 mg at 12/14/22 0917    rosuvastatin (CRESTOR) tablet 20 mg, 20 mg, Oral, QHS, Miriam Harrison MD, 20 mg at 12/13/22 2135    tamsulosin (FLOMAX) capsule 0.4 mg, 0.4 mg, Oral, DAILY, Heber Barajas MD, 0.4 mg at 12/14/22 0917    melatonin tablet 3 mg, 3 mg, Oral, QHS PRN, Mary Rosas NP, 3 mg at 12/13/22 2135    KIMBERLY Mayfield  [facility] [address] [address]  [facility]  [phone]      Jamshid Villanueva MD

## 2022-12-14 NOTE — PROGRESS NOTES
[address] [facility]  Hospitalist Group                                                                                          Hospitalist Progress Note  Brynn Maharaj MD  Answering service: [phone] OR [phone] from in house phone        Date of Service:  2022  NAME:  Osmin Noble  :  1950  MRN:  [id number]      Admission Summary:   Osmin Noble is a 71 y.o. male with hx of cad s/p cabg, ischemic cardiomyopathy, hfref, htn, ckd iii, pvd, dm ii who presents to hospital with complaints of sob and chest tightness. Had admission from  to  for cad and diagnostic left heart cath. Since discharge, reports progressive dyspnea with minimal exertion, chest tightness and orthopnea. Trail of sublingual nitro prior to ed presentation today offered no relief. The patient denies any fever, chills, chest or abdominal pain, nausea, vomiting, cough, congestion, recent illness, palpitations, or dysuria. Interval history / Subjective:     Patient seen and examined doing much better blood pressure low cannot tolerate beta-blocker or Entresto     Assessment & Plan:     Acute hypoxic respiratory failure on BiPAP --resolved now on nasal cannula  decompensated HFrEF last EF 35 to 40% aortic stenosis  -continue diuresis w/ lasix 40mg iv bid  -strict I&Os w/ daily weights  -monitor and replete lytes  -cardiology consult --on board  --Holding beta-blocker and Entresto due to hypotension  --Started on ivabradine      Acute hypoxic respiratory failure --possibly due to volume overload pulmonary edema  -procal and rvp normal  -hold further abx     CAD native vessel with unstable angina and NSTEMI  Prior CABG in 2018, cath 2021 open LIMA, PCI to OM 2 all the way up to the left main 2022. Cath  2022 showed open LIMA and RCA graft some in-stent restenosis in the long graft from the left main to OM 2 but not severe and the OM1 was more severely diseased in the small vessel.   Continue aspirin and Plavix   Event for revascularization versus goal-directed medical therapy per cardiology  Patient was tachycardic unable to tolerate beta-blocker   Patient was on amiodarone bolus followed by drip now discontinued    BPH continue Flomax    CKD III  -stable. Monitor with daily bmp       Code status: Full code  DVT prophylaxis: Lovenox    Care Plan discussed with: Patient/Family and Nurse  Anticipated Disposition: Home w/Family  Anticipated Discharge: 24 hours to 48 hours         Hospital Problems  Date Reviewed: 12/5/2022            Codes Class Noted POA    CHF (congestive heart failure) ([facility] [address] [address] ICD-10-CM: I50.9  ICD-9-CM: 428.0  12/12/2022 Unknown         Review of Systems:   A comprehensive review of systems was negative. Vital Signs:    Last 24hrs VS reviewed since prior progress note.  Most recent are:  Visit Vitals  BP (!) 110/43 (BP 1 Location: Left upper arm, BP Patient Position: At rest;Sitting)   Pulse (!) 114   Temp 97.7 °F (36.5 °C)   Resp 23   Ht 5' 9\" (1.753 m)   Wt 52.2 kg (115 lb 1.3 oz)   SpO2 100%   BMI 16.99 kg/m²         Intake/Output Summary (Last 24 hours) at 12/14/2022 1307  Last data filed at 12/14/2022 0920  Gross per 24 hour   Intake 150 ml   Output 3400 ml   Net -3250 ml          Physical Examination:     I had a face to face encounter with this patient and independently examined them on 12/14/2022 as outlined below:          General : alert x 3, awake, no acute distress, resting in bed, pleasant   HEENT: PEERL, EOMI, moist mucus membrane, TM clear  Neck: supple, no JVD, no meningeal signs  Chest: Clear to auscultation bilaterally   CVS: S1 S2 heard, Capillary refill less than 2 seconds  Abd: soft/ Non tender, non distended, BS physiological,   Ext: no clubbing, no cyanosis, no edema, brisk 2+ DP pulses  Neuro/Psych: pleasant mood and affect, CN 2-12 grossly intacT  Skin: warm     Data Review:    I personally reviewed  Image and LABS      Labs:     Recent Labs 12/13/22  0303 12/12/22  1840   WBC 7.9 9.3   HGB 10.2* 8.3*   HCT 34.3* 28.9*    257       Recent Labs     12/14/22  0042 12/13/22  1549 12/13/22  0303 12/12/22  0333 12/11/22  2308   *  --  139 136 134*   K 4.2 4.8 5.9* 5.2* 6.0*     --  109* 110* 107   CO2 24  --  21 19* 20*   BUN 43*  --  40* 32* 30*   CREA 1.87*  --  2.11* 1.86* 1.63*   *  --  200* 235* 295*   CA 9.2  --  9.1 8.5 9.0   MG  --   --   --   --  2.3       Recent Labs     12/11/22  2308   ALT 19   AP 85   TBILI 0.5   TP 7.9   ALB 3.6   GLOB 4.3*       No results for input(s): INR, PTP, APTT, INREXT, INREXT in the last 72 hours. No results for input(s): FE, TIBC, PSAT, FERR in the last 72 hours. Lab Results   Component Value Date/Time    Folate 10.5 07/03/2018 09:24 AM        No results for input(s): PH, PCO2, PO2 in the last 72 hours. No results for input(s): CPK, CKNDX, TROIQ in the last 72 hours.     No lab exists for component: CPKMB  Lab Results   Component Value Date/Time    Cholesterol, total 143 10/12/2022 09:10 AM    HDL Cholesterol 49 10/12/2022 09:10 AM    LDL, calculated 41.4 10/12/2022 09:10 AM    Triglyceride 263 (H) 10/12/2022 09:10 AM    CHOL/HDL Ratio 2.9 10/12/2022 09:10 AM     Lab Results   Component Value Date/Time    Glucose (POC) 283 (H) 12/08/2022 12:10 PM    Glucose (POC) 142 (H) 12/08/2022 08:33 AM    Glucose (POC) 211 (H) 12/07/2022 09:23 PM    Glucose (POC) 256 (H) 12/07/2022 04:55 PM    Glucose (POC) 375 (H) 12/07/2022 12:06 PM    Glucose,  (H) 12/11/2022 11:07 PM     Lab Results   Component Value Date/Time    Color YELLOW/STRAW 01/20/2021 08:56 AM    Appearance CLEAR 01/20/2021 08:56 AM    Specific gravity 1.015 01/20/2021 08:56 AM    Specific gravity 1.020 05/03/2014 08:18 AM    pH (UA) 5.5 01/20/2021 08:56 AM    Protein 300 (A) 01/20/2021 08:56 AM    Glucose Negative 01/20/2021 08:56 AM    Ketone Negative 01/20/2021 08:56 AM    Bilirubin Negative 01/20/2021 08:56 AM    Urobilinogen 0.2 01/20/2021 08:56 AM    Nitrites Negative 01/20/2021 08:56 AM    Leukocyte Esterase Negative 01/20/2021 08:56 AM    Epithelial cells FEW 01/20/2021 08:56 AM    Bacteria Negative 01/20/2021 08:56 AM    WBC 0-4 01/20/2021 08:56 AM    RBC 0-5 01/20/2021 08:56 AM         Medications Reviewed:     Current Facility-Administered Medications   Medication Dose Route Frequency    ivabradine (CORLANOR) tablet 2.5 mg  2.5 mg Oral BID WITH MEALS    enoxaparin (LOVENOX) injection 30 mg  30 mg SubCUTAneous DAILY    sodium chloride (NS) flush 5-40 mL  5-40 mL IntraVENous Q8H    sodium chloride (NS) flush 5-40 mL  5-40 mL IntraVENous PRN    acetaminophen (TYLENOL) tablet 650 mg  650 mg Oral Q6H PRN    Or    acetaminophen (TYLENOL) suppository 650 mg  650 mg Rectal Q6H PRN    polyethylene glycol (MIRALAX) packet 17 g  17 g Oral DAILY PRN    ondansetron (ZOFRAN ODT) tablet 4 mg  4 mg Oral Q8H PRN    Or    ondansetron (ZOFRAN) injection 4 mg  4 mg IntraVENous Q6H PRN    furosemide (LASIX) injection 40 mg  40 mg IntraVENous BID    aspirin delayed-release tablet 81 mg  81 mg Oral DAILY    clopidogreL (PLAVIX) tablet 75 mg  75 mg Oral DAILY    rosuvastatin (CRESTOR) tablet 20 mg  20 mg Oral QHS    tamsulosin (FLOMAX) capsule 0.4 mg  0.4 mg Oral DAILY    melatonin tablet 3 mg  3 mg Oral QHS PRN     ______________________________________________________________________  EXPECTED LENGTH OF STAY: 3d 19h  ACTUAL LENGTH OF STAY:          2      Please note that this dictation was completed with [employer], the computer voice recognition software. Quite often unanticipated grammatical, syntax, homophones, and other interpretive errors are inadvertently transcribed by the computer software. Please disregard these errors. Please excuse any errors that have escaped final proofreading.                 Lukas Reyes MD

## 2022-12-14 NOTE — PROGRESS NOTES
Transitions of Care Plan  RUR: 20% - high  Clinical Update: cards adjusting medications  Consults: Cardiology; Therapy  Baseline: independent without DME; resides w wife  Barriers to Discharge: medical  Disposition:  HH vs SNF - will need authorization  Estimated Discharge Date: 12/15/22    Patient's wife requested to speak with CM this afternoon. CM discussed patient's clinical progress and wife advised patient would benefit from rehab after discharge. Wife is unable to provide any physical assistance for patient at home. He has 18 steps to ambulate to primary bedroom. Patient is below his baseline of \"slow but independent without assistance\" per wife. CM advised therapy should work with patient again tomorrow as patient has clinically progressed, it would be expected his functional progress would be better, as well. Therapy can also work with patient on stairs. Wife still with concerns about patient going home including readmission again (pt readmitted within 48hrs of discharge from Friday). CM provided process for rehab referral including provider update, medical and insurance approval. Wife acknowledges understanding of same and requests to speak with provider as she and the adult children have several clinical questions. CM updated attending MD and received approval to pursue SNF. CM spoke with therapy and requested patient to be seen in the morning.     Rosa Villafuerte, MPH  Care Manager [facility] [employer]  Available via [address] [place] [address] or

## 2022-12-14 NOTE — PROGRESS NOTES
Comprehensive Nutrition Assessment    Type and Reason for Visit: Initial (Low BMI)    Nutrition Recommendations/Plan:   Continue with Low Fat/Low Chol/KAYLENE diet order  Will order Ensure Enlive BID       Malnutrition Assessment:  Malnutrition Status: Moderate malnutrition (12/14/22 1126)    Context:  Acute illness     Findings of the 6 clinical characteristics of malnutrition:   Energy Intake:  No significant decrease in energy intake  Weight Loss:  Greater than 5% over 1 month     Body Fat Loss:  Mild body fat loss, Triceps   Muscle Mass Loss:  Mild muscle mass loss, Clavicles (pectoralis &deltoids)  Fluid Accumulation:  No significant fluid accumulation,     Strength:  Not performed        Nutrition Assessment:    70 yo male admitted for CHF. Pmhx: CAD s/p CABG, ischemic cardiomyopathy, HFrEF, HTN, CKD stage III, PVD, DM. Seeing pt for low BMI. Spoke with pt at bedside. He reports not eating well since admission secondary to not liking the food. At home he thinks he has a good appetite but states he has never been a big eater. He states MD already told him that his wife can bring in food from home. Also provided him with diet office number to call and request specific foods. He eats mostly vegetarian at home. States he does not eat any beef so I will add that to his diet order. Discussed ONS options, pt would like to try Ensure Enlive, requesting vanilla or strawberry flavor. Weight is down 15% over last month which I discussed with pt. He states his UBW is 135 lbs which reflects what is in his weight hx. Some of this weight loss is due to fluid. He does not feel that he has been eating less at home to have caused this. Current standing scale weight is 115 lbs.     Labs:       Nutritionally Significant Medications:  Lasix      Estimated Daily Nutrient Needs:  Energy Requirements Based On: Kcal/kg  Weight Used for Energy Requirements: Current  Energy (kcal/day): 0358-4935 (30-35 kcals/kg)  Weight Used for Protein Requirements: Current  Protein (g/day): 78 (1.5 gm/kg (20%)  Method Used for Fluid Requirements: 1 ml/kcal  Fluid (ml/day): 1570    Nutrition Related Findings:   Edema: none  Last BM:  (PtA),      Wounds: None      Current Nutrition Therapies:  Diet: Low Fat/Low Chol/KAYLENE  Supplements: none  Meal intake: Patient Vitals for the past 168 hrs:   % Diet Eaten   12/14/22 0920 0%     Supplement intake: Patient Vitals for the past 168 hrs:   Supplement intake %   12/14/22 0920 0%     Nutrition Support: none      Anthropometric Measures:  Height: 5' 9\" (175.3 cm)  Ideal Body Weight (IBW): 160 lbs (73 kg)     Current Body Wt:  52.2 kg (115 lb 1.3 oz), 71.9 % IBW.  Standing scale  Current BMI (kg/m2): 17        Weight Adjustment: No adjustment                 BMI Category: Underweight (BMI less than 22) age over 72    Wt Readings from Last 10 Encounters:   12/14/22 52.2 kg (115 lb 1.3 oz)   12/05/22 59 kg (130 lb)   12/05/22 59.1 kg (130 lb 3.2 oz)   11/16/22 61.2 kg (135 lb)   10/27/22 60.8 kg (134 lb)   10/27/22 60.8 kg (134 lb)   10/12/22 60.8 kg (134 lb)   09/13/22 60.3 kg (133 lb)   06/23/22 61.1 kg (134 lb 9.6 oz)   05/18/22 63 kg (139 lb)           Nutrition Diagnosis:   Inadequate oral intake related to inadequate protein-energy intake as evidenced by intake 0-25%    Nutrition Interventions:   Food and/or Nutrient Delivery: Continue current diet, Start oral nutrition supplement  Nutrition Education/Counseling: No recommendations at this time  Coordination of Nutrition Care: Continue to monitor while inpatient       Goals:     Goals: other (specify)  Specify Other Goals: PO intakes > 50% meals + ONS to promote weight gain of 0.5-1 lb over next 5-7 days    Nutrition Monitoring and Evaluation:   Behavioral-Environmental Outcomes: None identified  Food/Nutrient Intake Outcomes: Food and nutrient intake, Supplement intake  Physical Signs/Symptoms Outcomes: Biochemical data, GI status, Weight    Discharge Planning:    Continue current diet, Continue oral nutrition supplement    Virgie Azul RD  Available via [employer]

## 2022-12-15 ENCOUNTER — TELEPHONE (OUTPATIENT)
Dept: FAMILY MEDICINE CLINIC | Age: 72
End: 2022-12-15

## 2022-12-15 PROCEDURE — 74011250637 HC RX REV CODE- 250/637: Performed by: NURSE PRACTITIONER

## 2022-12-15 PROCEDURE — 99233 SBSQ HOSP IP/OBS HIGH 50: CPT | Performed by: INTERNAL MEDICINE

## 2022-12-15 PROCEDURE — 65660000001 HC RM ICU INTERMED STEPDOWN

## 2022-12-15 PROCEDURE — 74011250637 HC RX REV CODE- 250/637: Performed by: HOSPITALIST

## 2022-12-15 PROCEDURE — 97116 GAIT TRAINING THERAPY: CPT

## 2022-12-15 PROCEDURE — 74011250636 HC RX REV CODE- 250/636: Performed by: HOSPITALIST

## 2022-12-15 PROCEDURE — 74011000250 HC RX REV CODE- 250: Performed by: STUDENT IN AN ORGANIZED HEALTH CARE EDUCATION/TRAINING PROGRAM

## 2022-12-15 PROCEDURE — 74011250637 HC RX REV CODE- 250/637: Performed by: INTERNAL MEDICINE

## 2022-12-15 PROCEDURE — 74011250637 HC RX REV CODE- 250/637: Performed by: STUDENT IN AN ORGANIZED HEALTH CARE EDUCATION/TRAINING PROGRAM

## 2022-12-15 RX ORDER — FUROSEMIDE 40 MG/1
40 TABLET ORAL DAILY
Status: DISCONTINUED | OUTPATIENT
Start: 2022-12-15 | End: 2022-12-15

## 2022-12-15 RX ORDER — TAMSULOSIN HYDROCHLORIDE 0.4 MG/1
0.4 CAPSULE ORAL
Status: DISCONTINUED | OUTPATIENT
Start: 2022-12-15 | End: 2022-12-16 | Stop reason: HOSPADM

## 2022-12-15 RX ORDER — FUROSEMIDE 40 MG/1
40 TABLET ORAL DAILY
Status: DISCONTINUED | OUTPATIENT
Start: 2022-12-16 | End: 2022-12-16 | Stop reason: HOSPADM

## 2022-12-15 RX ADMIN — ZOLPIDEM TARTRATE 5 MG: 5 TABLET ORAL at 21:58

## 2022-12-15 RX ADMIN — TAMSULOSIN HYDROCHLORIDE 0.4 MG: 0.4 CAPSULE ORAL at 08:34

## 2022-12-15 RX ADMIN — IVABRADINE 2.5 MG: 5 TABLET, FILM COATED ORAL at 16:15

## 2022-12-15 RX ADMIN — Medication 10 ML: at 14:24

## 2022-12-15 RX ADMIN — Medication 10 ML: at 21:55

## 2022-12-15 RX ADMIN — EMPAGLIFLOZIN 10 MG: 10 TABLET, FILM COATED ORAL at 12:58

## 2022-12-15 RX ADMIN — IVABRADINE 2.5 MG: 5 TABLET, FILM COATED ORAL at 08:33

## 2022-12-15 RX ADMIN — TAMSULOSIN HYDROCHLORIDE 0.4 MG: 0.4 CAPSULE ORAL at 21:54

## 2022-12-15 RX ADMIN — Medication 3 MG: at 21:57

## 2022-12-15 RX ADMIN — ROSUVASTATIN 20 MG: 10 TABLET, FILM COATED ORAL at 21:54

## 2022-12-15 RX ADMIN — ASPIRIN 81 MG: 81 TABLET, COATED ORAL at 08:33

## 2022-12-15 RX ADMIN — CLOPIDOGREL BISULFATE 75 MG: 75 TABLET ORAL at 08:33

## 2022-12-15 RX ADMIN — ENOXAPARIN SODIUM 30 MG: 100 INJECTION SUBCUTANEOUS at 08:34

## 2022-12-15 NOTE — TELEPHONE ENCOUNTER
----- Message from Alise Merlin sent at 12/15/2022  3:47 PM EST -----  Subject: Message to Provider    QUESTIONS  Information for Provider? Called to inform PCP that pt has been admitted   [facility] heart failure on 12/12/22. (Pt was   unavailable to provide permission to leave a voice message. )  ---------------------------------------------------------------------------  --------------  Elle Hayes [facility]  [phone]; Do not leave any message, patient will call back for answer  ---------------------------------------------------------------------------  --------------  SCRIPT ANSWERS  Relationship to Patient? Third Party  Third Party Type? Insurance? Representative Name?  Esther Gr

## 2022-12-15 NOTE — PROGRESS NOTES
[facility]  Cardiology Care Note                  [x]Initial visit     []Established visit     Patient Name: Kendrick Nielsen - NQB:[id number]  Primary Cardiologist: Kash Gipson MD  Consulting Cardiologist: Heraclio Magallon MD     Reason for initial visit:  dyspnea, decompensated HF, tachycardia. HPI:     CAD with CABG 6/9/2018. NSTEMI in November 2016 and resolved pulmonary HTN. Stent to circumflex an LAD in 2016. Stress echo in 2018 with a drop in BP with exercise and a drop in EF. Cath on 6/22/18 that showed even with a 5F catheter, he dampened with suspected ostial 3 vessel disease. Echo 3/27/2021 = EF 55 to 60% mild AR MR TR LAE MAC  Nuclear 3/18/2021 EF 64% medium size defect apical and inferior lateral reversible ischemia medium defect mid and inferolateral nonreversible appear to be infarction intermediate risk stress test .  PCI 02/28/2022--patent LIMA to LAD, vein graft to distal RCA with severe disease in the native vessels Occluded vein graft to OM 2. Native OM 2 severely diseased along with proximal to mid circumflex as well as distal left main. Overall the vessel is significantly remodeled negatively. Additionally there is significant disease in the first marginal branch which is even smaller as well as AV groove branch right at the takeoff of OM2. Single stent 2.25 x 28 mm Xience was placed extending from the OM 2 into the circumflex into the distal left main and sequentially dilated with 2.25 noncompliant, 2 5 noncompliant, 3 oh noncompliant and 3 5 noncompliant to perform multilevel POT to manage multiple bifurcations on the way. Atherectomy was considered but given small size of the vessels, was not deemed to be absolutely safe for the obtuse marginal lesion. Overall stent expanded fairly well related to the size of the vessels.  Normal LVEDP  Nuclear stress October 27, 2022 test showed ischemia similar to 3/15/2021 with a medium size defect in the mid to distal inferolateral and anterolateral segments in the circumflex territory he had septal dyskinesia with an EF of 39%. He had a fixed apical infarct  Echocardiogram October 27, 2022 showed an EF of 45 to 50% TAPSE at 1.2 showing reduced RV function sclerosis of the aortic valve with stenosis that was very mild with a valve area of mean of 7 mmHg and HUY of 1.4 cm². The mitral was thickened with restricted mobility and mild MR.      SUBJECTIVE:    Reports significant improvement in dyspnea. Not short of breath. Of oxygen. Assessment and Plan       Dyspnea/decompensated HFrEF- tachycardic,Chest CT with Overall mild increase diffuse interlobular septal thickening and patchy bilateral lung opacities in keeping with pulmonary edema with superimposed infection/inflammation not excluded. Diurese with IV lasix. Unlikely to be a candidate for Entresto given severe hypotension and hyperkalemia. Hold BB since BP still low. Continue corlanor. Change IV lasix to po. Will restart SGLT2i. Potentially discharge tomorrow if blood pressure stable. May have been over diuresed. Recheck renal function tomorrow. 2  CAD native vessel with unstable angina and NSTEMI  Prior CABG in 2018, cath 9/8/2021 open LIMA, PCI to OM 2 all the way up to the left main 2/28/2022. Cath yesterday 12/6/2022 showed open LIMA and RCA graft some in-stent restenosis in the long graft from the left main to OM 2 but not severe and the OM1 was more severely diseased in the small vessel. Interventional cardiology felt no reasonable vessel to intervene and recommended medical management.   The OM disease is consistent with a lateral wall ischemia demonstrated on prior nuclear stress test.  Continue aspirin and Plavix noted mild anemia-hemoglobin on admission (10.8)    3  Cardiomyopathy systolic heart failure: Rapid deterioration in LV function with overall WMA on recent echo appears not to be in teritorry of ischemia suggesting alternative mechanism fo cardiomyopathy(stress SMP vs myocarditis). Basal segments appear to be hyperdynamic compared to apical segments which may point towards a possibility of stress cardiomyopathy. Notably apex is significantly hypokinetic but LIMA to LAD is patent on recent cardiac catheterization hence cardiomyopathy is unlikely to be ischemia driven. .    4.  Aortic stenosis mild mean gradient of 7 nn Hg, HUY 1.4 by echo 11/27/2022 -mild , murmur     5. Diabetes mellitus type 2 previously managed by hospitalist service now has a glucose of 377 patient is currently on antihyperglycemic's and will involve diabetes treatment center for assistance  On insulin lispro and alogliptin    6  Hypertension previously hypertensive now with low blood pressure  Will review meds and consider changes based on optimal medical therapy for congestive heart failure    7. 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 Likely secondary to hypotension and entresto. We will stop Entresto. Continue Lasix now in the form of p.o. going forward. Restart SGLT2 inhibitors. Follow-up in 1 week with Dr. Mary Tavarez. Future Appointments   Date Time Provider Mina Delgado   12/19/2022  8:20 AM Chino Mccormick NP [facility] BS AMB   2/1/2023  3:00 PM MARY ANN CACERES BS AMB   2/1/2023  3:40 PM Monik Hogan MD [facility] AMB   2/14/2023  8:40 AM MD HI Gastelum BS AMB   3/15/2023  1:00 PM MARY ANN KEITH BS AMB   3/15/2023  1:40 PM MD TEZ Lafleur BS AMB         8 XOL  Statin intolerant-improved. Interesting that he had such progression of coronary disease despite being relatively low and weight not sedentary and having to low LDL on a high potency statin. If his LDL was higher I consider adding a PCSK9 but I cannot really recommend that given his LDL 41. At goal , denies excess muscle aches or new liver issues ____________________________________________________________    Cardiac testing  10/27/22    ECHO ADULT COMPLETE 10/27/2022 10/27/2022    Interpretation Summary    Left Ventricle: Mildly reduced left ventricular systolic function with a visually estimated EF of 45 - 50%. Left ventricle size is normal. Normal wall thickness. Normal wall motion. Abnormal diastolic function. Right Ventricle: Reduced systolic function. TAPSE is 1.2 cm. Aortic Valve: Valve structure is normal. Mild sclerosis of the aortic valve cusp. Mild annular calcification. Mild regurgitation. Mild stenosis of the aortic valve. AV mean gradient is 7 mmHg. AV peak gradient is 13 mmHg. LVOT:AV VTI Index is 0.55. AV area by peak velocity is 1.4 cm2. Mitral Valve: Moderately thickened leaflet. Mild annular calcification of the mitral valve. The posterior leaflet of the mitral valve has restricted mobility. Mild regurgitation. Left Atrium: Left atrium is dilated. Signed by: Ericka Franklin MD on 10/27/2022  4:49 PM        10/27/22    NUCLEAR CARDIAC STRESS TEST 10/27/2022 11/1/2022    Interpretation Summary    Nuclear Findings: The study is positive for myocardial ischemia. The defect appears to be ischemia. The areas of ischemia are similar to 3/15/2021 study. Nuclear Findings: There is a moderate severity left ventricular stress perfusion defect that is medium in size present in the mid to distal inferolateral and anterolateral segment(s) that is reversible. The defect is consistent with abnormal perfusion in the LCx territory. There is normal wall motion in the defect area. The defect appears to be probable ischemia. There is a moderate severity second left ventricular stress perfusion defect. The defect appears to be infarction. Nuclear Findings: Abnormal left ventricular systolic function post-stress. Septal dyskinesis. Post-stress ejection fraction is 39%.     ECG: Resting ECG demonstrates normal sinus rhythm. ECG: Stress ECG was negative for ischemia. Stress Test: A pharmacological stress test was performed using lexiscan. Blood pressure demonstrated a normal response and heart rate demonstrated a normal response to stress. The patient's heart rate recovery was normal. The patient reported no symptoms during the stress test.    Signed by: Cecilia Ruiz MD on 10/27/2022  4:45 PM    02/28/22    CARDIAC PROCEDURE 02/28/2022 2/28/2022    Conclusion  Findings  1. Severe native multivessel coronary disease  2. Patent LIMA to LAD, vein graft to distal RCA with severe disease in the native vessels  3. Occluded vein graft to OM 2. Native OM 2 severely diseased along with proximal to mid circumflex as well as distal left main. Overall the vessel is significantly remodeled negatively. Additionally there is significant disease in the first marginal branch which is even smaller as well as AV groove branch right at the takeoff of OM2. Single stent 2.25 x 28 mm Xience was placed extending from the OM 2 into the circumflex into the distal left main and sequentially dilated with 2.25 noncompliant, 2 5 noncompliant, 3 oh noncompliant and 3 5 noncompliant to perform multilevel POT to manage multiple bifurcations on the way. Atherectomy was considered but given small size of the vessels, was not deemed to be absolutely safe for the obtuse marginal lesion. Overall stent expanded fairly well related to the size of the vessels. 4.  Normal LVEDP    Access right radial: Small vessel, tortuous} brachiocephalic  Contrast 65 cc    Recommendations  1. Plavix based dual antiplatelet therapy  2. Guideline directed medical therapy for secondary prevention of coronary events    Signed by: Sammie Canseco MD on 2/28/2022  4:22 PM        Most recent HS troponins:  No results for input(s): TROPHS in the last 72 hours.     No lab exists for component:  CKMB      Review of Systems    [x]All other systems reviewed and all negative except as written in HPI    [] Patient unable to provide secondary to condition         Past Medical History:   Diagnosis Date    CAD (coronary artery disease) 11/10/2016    NSTEMI & 2 stents    Deafness 10/28/2012    DM (diabetes mellitus) ([address]     Elevated cholesterol     Hypertension     NSTEMI (non-ST elevated myocardial infarction) ([address] 11/10/2016     Past Surgical History:   Procedure Laterality Date    COLONOSCOPY N/A 6/28/2018    COLONOSCOPY performed by Adele Velarde MD at [address]  11/11/2016    2 stents     Social Hx:  reports that he has never smoked. He has never been exposed to tobacco smoke. He has never used smokeless tobacco. He reports current alcohol use of about 2.0 standard drinks per week. He reports that he does not use drugs. Family Hx: family history includes Elevated Lipids in his brother, brother, and brother; Heart Attack in his father; Heart Disease in his father; Hypertension in his mother; No Known Problems in his daughter, sister, and son. No Known Allergies       OBJECTIVE:  Wt Readings from Last 3 Encounters:   12/15/22 123 lb 7.3 oz (56 kg)   12/05/22 130 lb (59 kg)   12/05/22 130 lb 3.2 oz (59.1 kg)       Intake/Output Summary (Last 24 hours) at 12/15/2022 1021  Last data filed at 12/15/2022 0834  Gross per 24 hour   Intake 650 ml   Output 1700 ml   Net -1050 ml           Physical Exam    Vitals:   Vitals:    12/15/22 0551 12/15/22 0708 12/15/22 0712 12/15/22 0834   BP:  (!) 92/40 (!) 82/44    Pulse: 95 91  89   Resp:  21  19   Temp:  97.8 °F (36.6 °C)     SpO2:  100%  98%   Weight:  123 lb 7.3 oz (56 kg)     Height:         Telemetry: normal sinus rhythm    BP (!) 82/44 (BP 1 Location: Left upper arm)   Pulse 89   Temp 97.8 °F (36.6 °C)   Resp 19   Ht 5' 9\" (1.753 m)   Wt 123 lb 7.3 oz (56 kg)   SpO2 98%   BMI 18.23 kg/m²   General:    Alert, cooperative, no distress.    Psychiatric: Normal Mood and affect    Eye/ENT:      Pupils equal, No asymmetry, Conjunctival pink. Able to hear voice at normal amplitude   Lungs:      Visibly symmetric chest expansion, No palpable tenderness. significant bibasilar crackles. Heart[de-identified]    Regular rate and rhythm, S1, S2 normal, no murmur, click, rub or gallop. No JVD, Normal palpable peripheral pulses. No cyanosis   Abdomen:     Soft, non-tender. Bowel sounds normal. No masses,  No      organomegaly. Extremities:   Extremities normal, atraumatic, no edema. Neurologic:   CN II-XII grossly intact. No gross focal deficits           Data Review:     Radiology:   XR Results (most recent):  Results from Hospital Encounter encounter on 12/11/22    XR CHEST PORT    Narrative  EXAM:  XR CHEST PORT    INDICATION: Chest pain and shortness of breath    COMPARISON: 12/5/2022    TECHNIQUE: Portable AP upright chest view at 2315 hours    FINDINGS: The cardiomediastinal contours are stable. There are increased diffuse interstitial and patchy airspace opacities. There  are small pleural effusions. There is no pneumothorax. The bones and upper  abdomen are stable. Impression  Increased diffuse interstitial and patchy airspace opacities can be seen with  pulmonary edema or infection. Small pleural effusions. CT Results (most recent):  Results from Hospital Encounter encounter on 12/11/22    CT CHEST WO CONT    Narrative  EXAM:  CT CHEST WO CONT    INDICATION:  Shortness of breath    COMPARISON: Radiograph 12/11/2022. CT 12/5/2022. TECHNIQUE: Helical CT of the chest is performed without intravenous contrast.  Coronal and sagittal reformatted images are obtained. CT dose reduction was  achieved through use of a standardized protocol tailored for this examination  and automatic exposure control for dose modulation. Adaptive statistical  iterative reconstruction (ASIR) was utilized. FINDINGS:  The visualized thyroid gland is unremarkable.  The aorta and main pulmonary  artery are stable in caliber. CABG surgical changes are noted. The heart size is  stable. There is no pericardial effusion. There are no enlarged axillary, mediastinal, or hilar lymph nodes. There is slightly increased small to moderate pleural effusions. Diffuse  interlobular septal thickening with patchy bilateral lung opacities is mildly  increased since 12/5/2022. There is cholelithiasis. The right adrenal nodule is unchanged. There is no  acute abnormality in the visualized upper abdomen. There are degenerative  changes in the spine. There is no aggressive bony lesion. Impression  1. Slightly increased small to moderate pleural effusions. 2. Overall mild increase diffuse interlobular septal thickening and patchy  bilateral lung opacities in keeping with pulmonary edema with superimposed  infection/inflammation not excluded. Follow up to resolution is suggested. MRI Results (most recent):  Results from [place] encounter on 05/22/16    MRI LUMB SPINE WO CONT    Narrative  **Final Report**      ICD Codes / Adm. Diagnosis: 724.2  M54.5 / Lumbago  Low back pain  Examination:  MR David Zhou CON  - [id number] - May 22 2016  1:45PM  Accession No:  [id number]  Reason:  Low back pain      REPORT:  Indication: Pain and back extends into right leg to foot    Exam: MRI of the lumbar spine. Sequences include sagittal and axial T1 and  T2-weighted images. Sagittal STIR. Comparisons: April 27, 2016    Contrast: None    Findings: Alignment is normal. There is no evidence of marrow replacement or  acute fracture. Cord terminus is within normal limits. Paraspinous soft  tissues are within normal limits.     T12-L1: No stenosis    L1-L2: There is desiccation of this disc with a small bulge but no stenosis    L2-L3: There is desiccation of this disc with a small bulge but no stenosis    L3-L4: There is mild left facet arthropathy but no stenosis    L4-L5: There is disc height loss with a small disc bulge. Facet arthropathy. No stenosis    L5-S1: There is disc height loss with a small disc bulge and left  paracentral disc extrusion extending inferiorly. This mildly distorts the  left S1 nerve root in the subarticular zone and left lateral recess. There  are facet degenerative changes with moderate left and mild-to-moderate right  foraminal narrowing    Impression  :  1. Multilevel degenerative change detailed by level above most significant  at L5-S1                Signing/Reading Doctor: Radha Callejas ([id number])  Approved: Radha Callejas ([id number])  May 23 2016  8:39AM        No results for input(s): CPK, TROIQ in the last 72 hours. No lab exists for component: CKQMB, CPKMB, BMPP  Recent Labs     12/14/22  0042 12/13/22  1549 12/13/22  0303   *  --  139   K 4.2 4.8 5.9*     --  109*   CO2 24  --  21   BUN 43*  --  40*   CREA 1.87*  --  2.11*   *  --  200*   CA 9.2  --  9.1     Recent Labs     12/13/22  0303 12/12/22  1840   WBC 7.9 9.3   HGB 10.2* 8.3*   HCT 34.3* 28.9*    257     No results for input(s): PTP, INR, AP, INREXT, INREXT in the last 72 hours. No lab exists for component: PTTP, GPT, SGOT    No results for input(s): CHOL, LDLC in the last 72 hours. No lab exists for component: TGL, HDLC,  HBA1C  No results for input(s): CRP, TSH, TSHEXT, TSHEXT in the last 72 hours.     No lab exists for component: ESR          Current meds:    Current Facility-Administered Medications:     ivabradine (CORLANOR) tablet 2.5 mg, 2.5 mg, Oral, BID WITH MEALS, Naty Diaz ANP, 2.5 mg at 12/15/22 0833    guaiFENesin (ROBITUSSIN) 100 mg/5 mL oral liquid 100 mg, 100 mg, Oral, Q4H PRN, Omid Degroot MD, 100 mg at 12/14/22 1847    zolpidem (AMBIEN) tablet 5 mg, 5 mg, Oral, QHS PRN, Omid Degroot MD, 5 mg at 12/14/22 2115    enoxaparin (LOVENOX) injection 30 mg, 30 mg, SubCUTAneous, DAILY, Omid Degroot MD, 30 mg at 12/15/22 0834    sodium chloride (NS) flush 5-40 mL, 5-40 mL, IntraVENous, Q8H, Heber Barajas MD, 10 mL at 12/14/22 2116    sodium chloride (NS) flush 5-40 mL, 5-40 mL, IntraVENous, PRN, Heber Barajas MD    acetaminophen (TYLENOL) tablet 650 mg, 650 mg, Oral, Q6H PRN **OR** acetaminophen (TYLENOL) suppository 650 mg, 650 mg, Rectal, Q6H PRN, Heber Barajas MD    polyethylene glycol (MIRALAX) packet 17 g, 17 g, Oral, DAILY PRN, Heber Barajas MD    ondansetron (ZOFRAN ODT) tablet 4 mg, 4 mg, Oral, Q8H PRN **OR** ondansetron (ZOFRAN) injection 4 mg, 4 mg, IntraVENous, Q6H PRN, Heber Barajas MD    furosemide (LASIX) injection 40 mg, 40 mg, IntraVENous, BID, Heber Barajas MD, 40 mg at 12/14/22 1732    aspirin delayed-release tablet 81 mg, 81 mg, Oral, DAILY, Heber Barajas MD, 81 mg at 12/15/22 0833    clopidogreL (PLAVIX) tablet 75 mg, 75 mg, Oral, DAILY, Heber Barajas MD, 75 mg at 12/15/22 0833    rosuvastatin (CRESTOR) tablet 20 mg, 20 mg, Oral, QHS, Heber Barajas MD, 20 mg at 12/14/22 2114    tamsulosin (FLOMAX) capsule 0.4 mg, 0.4 mg, Oral, DAILY, Heber Barajas MD, 0.4 mg at 12/15/22 0834    melatonin tablet 3 mg, 3 mg, Oral, QHS PRN, Charlene Lopez NP, 3 mg at 12/14/22 2115    KIMBERLY Carlton  [facility] [address] [place] [address] 13, [address] [id number]  [address], [address]  [phone]      Bess Garay MD

## 2022-12-15 NOTE — PROGRESS NOTES
[facility] [facility]  Hospitalist Group                                                                                          Hospitalist Progress Note  Emil Drake MD  Answering service: [phone] OR [phone] from in house phone        Date of Service:  12/15/2022  NAME:  Demond Cazares  :  1950  MRN:  [id number]      Admission Summary:   Demond Cazares is a 71 y.o. male with hx of cad s/p cabg, ischemic cardiomyopathy, hfref, htn, ckd iii, pvd, dm ii who presents to hospital with complaints of sob and chest tightness. Had admission from  to  for cad and diagnostic left heart cath. Since discharge, reports progressive dyspnea with minimal exertion, chest tightness and orthopnea. Trail of sublingual nitro prior to ed presentation today offered no relief. The patient denies any fever, chills, chest or abdominal pain, nausea, vomiting, cough, congestion, recent illness, palpitations, or dysuria. CC: Follow-up for hypotension. Interval history / Subjective:     Patient is sitting up in a chair, on RA, denies any complaints. BP remains low with SBP in the 80's. Patient is asymptomatic. He worked with PT this morning, including stairs. PT recommends Home with HH. Assessment & Plan:     Acute hypoxic respiratory failure on BiPAP --resolved now on nasal cannula  decompensated HFrEF last EF 35 to 40% aortic stenosis  -continue diuresis w/ lasix 40mg iv bid  -strict I&Os w/ daily weights  -monitor and replete lytes  -cardiology consult --on board  --Holding beta-blocker and Entresto due to hypotension  --Started on ivabradine on ;   - 12/15: BP remains low, SBP in the 80's; Lasix was changed to PO; change Flomax to HS; patient is not on any other BP medications; monitor orthostatics;   - appreciate Cardiology follow-up and recommendations;       Acute hypoxic respiratory failure --possibly due to volume overload pulmonary edema: resolved;   -procal and rvp normal  -hold further abx  - afebrile, no leukocytosis, no cough, SOB/DONALDSON resolved with diuresis;      CAD native vessel with unstable angina and NSTEMI  Prior CABG in 2018, cath 9/8/2021 open LIMA, PCI to OM 2 all the way up to the left main 2/28/2022. Cath  12/6/2022 showed open LIMA and RCA graft some in-stent restenosis in the long graft from the left main to OM 2 but not severe and the OM1 was more severely diseased in the small vessel. Continue aspirin and Plavix   Event for revascularization versus goal-directed medical therapy per cardiology  Unable to tolerate beta-blocker due to hypotension; Patient was on amiodarone bolus followed by drip now discontinued  Unable to tolerate Entresto due to hypotension and hyperkalemia (present on admission, now resolved);   - started Corlanor on 12/14;     BPH continue Flomax    CKD III  -stable. Monitor with daily bmp  - 12/15: BMP not collected today, ordered; Code status: Full code  DVT prophylaxis: Lovenox    Care Plan discussed with: Patient/Family and Nurse  Anticipated Disposition: Home w/Family  Anticipated Discharge: home tomorrow with [facility];     - I called patient's wife Vic Fink  and updated her about patient's condition;   Patient worked with PT today and did well, he does not need SNF based on their recommendations; She wishes to speak with patient's Cardiologist first, Dr. Emmanuel Pendleton or Dr. Teagan Nash;   She does not feel comfortable taking patient home with his BP being so low, and the Cardiologist told her he needs \"rehab\"; Hospital Problems  Date Reviewed: 12/5/2022            Codes Class Noted POA    CHF (congestive heart failure) ([facility] [address] ICD-10-CM: I50.9  ICD-9-CM: 428.0  12/12/2022 Unknown         Review of Systems:   A comprehensive review of systems was negative. Vital Signs:    Last 24hrs VS reviewed since prior progress note.  Most recent are:  Visit Vitals  BP (!) 82/44 (BP 1 Location: Left upper arm)   Pulse 89 Temp 97.8 °F (36.6 °C)   Resp 19   Ht 5' 9\" (1.753 m)   Wt 56 kg (123 lb 7.3 oz)   SpO2 98%   BMI 18.23 kg/m²         Intake/Output Summary (Last 24 hours) at 12/15/2022 1055  Last data filed at 12/15/2022 0834  Gross per 24 hour   Intake 650 ml   Output 1700 ml   Net -1050 ml          Physical Examination:     I had a face to face encounter with this patient and independently examined them on 12/15/2022 as outlined below:          General : alert x 3, awake, no acute distress, resting in bed, pleasant   HEENT: PEERL, EOMI, moist mucus membrane, TM clear  Neck: supple, no JVD, no meningeal signs  Chest: Clear to auscultation bilaterally   CVS: S1 S2 heard, Capillary refill less than 2 seconds  Abd: soft/ Non tender, non distended, BS physiological,   Ext: no clubbing, no cyanosis, no edema, brisk 2+ DP pulses  Neuro/Psych: pleasant mood and affect, CN 2-12 grossly intacT  Skin: warm     Data Review:    I personally reviewed  Image and LABS      Labs:     Recent Labs     12/13/22  0303 12/12/22  1840   WBC 7.9 9.3   HGB 10.2* 8.3*   HCT 34.3* 28.9*    257       Recent Labs     12/14/22  0042 12/13/22  1549 12/13/22  0303   *  --  139   K 4.2 4.8 5.9*     --  109*   CO2 24  --  21   BUN 43*  --  40*   CREA 1.87*  --  2.11*   *  --  200*   CA 9.2  --  9.1       No results for input(s): ALT, AP, TBIL, TBILI, TP, ALB, GLOB, GGT, AML, LPSE in the last 72 hours. No lab exists for component: SGOT, GPT, AMYP, HLPSE    No results for input(s): INR, PTP, APTT, INREXT, INREXT in the last 72 hours. No results for input(s): FE, TIBC, PSAT, FERR in the last 72 hours. Lab Results   Component Value Date/Time    Folate 10.5 07/03/2018 09:24 AM        No results for input(s): PH, PCO2, PO2 in the last 72 hours. No results for input(s): CPK, CKNDX, TROIQ in the last 72 hours.     No lab exists for component: CPKMB  Lab Results   Component Value Date/Time    Cholesterol, total 143 10/12/2022 09:10 AM HDL Cholesterol 49 10/12/2022 09:10 AM    LDL, calculated 41.4 10/12/2022 09:10 AM    Triglyceride 263 (H) 10/12/2022 09:10 AM    CHOL/HDL Ratio 2.9 10/12/2022 09:10 AM     Lab Results   Component Value Date/Time    Glucose (POC) 283 (H) 12/08/2022 12:10 PM    Glucose (POC) 142 (H) 12/08/2022 08:33 AM    Glucose (POC) 211 (H) 12/07/2022 09:23 PM    Glucose (POC) 256 (H) 12/07/2022 04:55 PM    Glucose (POC) 375 (H) 12/07/2022 12:06 PM    Glucose,  (H) 12/11/2022 11:07 PM     Lab Results   Component Value Date/Time    Color YELLOW/STRAW 01/20/2021 08:56 AM    Appearance CLEAR 01/20/2021 08:56 AM    Specific gravity 1.015 01/20/2021 08:56 AM    Specific gravity 1.020 05/03/2014 08:18 AM    pH (UA) 5.5 01/20/2021 08:56 AM    Protein 300 (A) 01/20/2021 08:56 AM    Glucose Negative 01/20/2021 08:56 AM    Ketone Negative 01/20/2021 08:56 AM    Bilirubin Negative 01/20/2021 08:56 AM    Urobilinogen 0.2 01/20/2021 08:56 AM    Nitrites Negative 01/20/2021 08:56 AM    Leukocyte Esterase Negative 01/20/2021 08:56 AM    Epithelial cells FEW 01/20/2021 08:56 AM    Bacteria Negative 01/20/2021 08:56 AM    WBC 0-4 01/20/2021 08:56 AM    RBC 0-5 01/20/2021 08:56 AM         Medications Reviewed:     Current Facility-Administered Medications   Medication Dose Route Frequency    [START ON 12/16/2022] furosemide (LASIX) tablet 40 mg  40 mg Oral DAILY    empagliflozin (JARDIANCE) tablet 10 mg  10 mg Oral DAILY    ivabradine (CORLANOR) tablet 2.5 mg  2.5 mg Oral BID WITH MEALS    guaiFENesin (ROBITUSSIN) 100 mg/5 mL oral liquid 100 mg  100 mg Oral Q4H PRN    zolpidem (AMBIEN) tablet 5 mg  5 mg Oral QHS PRN    enoxaparin (LOVENOX) injection 30 mg  30 mg SubCUTAneous DAILY    sodium chloride (NS) flush 5-40 mL  5-40 mL IntraVENous Q8H    sodium chloride (NS) flush 5-40 mL  5-40 mL IntraVENous PRN    acetaminophen (TYLENOL) tablet 650 mg  650 mg Oral Q6H PRN    Or    acetaminophen (TYLENOL) suppository 650 mg  650 mg Rectal Q6H PRN polyethylene glycol (MIRALAX) packet 17 g  17 g Oral DAILY PRN    ondansetron (ZOFRAN ODT) tablet 4 mg  4 mg Oral Q8H PRN    Or    ondansetron (ZOFRAN) injection 4 mg  4 mg IntraVENous Q6H PRN    aspirin delayed-release tablet 81 mg  81 mg Oral DAILY    clopidogreL (PLAVIX) tablet 75 mg  75 mg Oral DAILY    rosuvastatin (CRESTOR) tablet 20 mg  20 mg Oral QHS    tamsulosin (FLOMAX) capsule 0.4 mg  0.4 mg Oral DAILY    melatonin tablet 3 mg  3 mg Oral QHS PRN     ______________________________________________________________________  EXPECTED LENGTH OF STAY: 3d 19h  ACTUAL LENGTH OF STAY:          3      Please note that this dictation was completed with [employer], the computer voice recognition software. Quite often unanticipated grammatical, syntax, homophones, and other interpretive errors are inadvertently transcribed by the computer software. Please disregard these errors. Please excuse any errors that have escaped final proofreading.                 Boris Taylor MD

## 2022-12-15 NOTE — PROGRESS NOTES
0830  Informed MD of BP of 82/44 taken manually. Held diuretic r/t low BP and informed MD.  Re-educated pt about fall risk, confirmed placement of chair alarm. Will continue to monitor. 0730  Verbal bedside report received from Alison Garg RN off-going nurse by Jeffy Nguyen. LORI Dixon oncoming nurse. Report included current pt status and condition, recent results, hx of present illness, heart rate and rhythm (NSR), and respiratory status. Greeted pt and enquired about present needs and goals for the day.

## 2022-12-15 NOTE — PROGRESS NOTES
1115 Bedside shift change report given to Gabrielle Gray (oncoming nurse) by Krystal Godoy (offgoing nurse). Report included the following information SBAR, Kardex, ED Summary, Procedure Summary, Intake/Output, MAR, and Recent Results. 1410 TRANSFER - OUT REPORT:    Verbal report given to Belkis(name) on Lexington Cannon  being transferred to (unit) for routine progression of care       Report consisted of patients Situation, Background, Assessment and   Recommendations(SBAR). Information from the following report(s) SBAR, Kardex, ED Summary, Procedure Summary, Intake/Output, MAR, and Recent Results was reviewed with the receiving nurse. Lines:   Peripheral IV 12/11/22 Right Antecubital (Active)   Site Assessment Clean, dry, & intact 12/15/22 0834   Phlebitis Assessment 0 12/15/22 0834   Infiltration Assessment 0 12/15/22 0834   Dressing Status Clean, dry, & intact 12/15/22 0834   Dressing Type Transparent;Tape 12/15/22 0834   Hub Color/Line Status Green;Flushed 12/15/22 0834   Action Taken Open ports on tubing capped 12/15/22 0834   Alcohol Cap Used Yes 12/15/22 0834        Opportunity for questions and clarification was provided.       Patient transported with:   Monitor  Registered Nurse  Tech

## 2022-12-15 NOTE — PROGRESS NOTES
Problem: Mobility Impaired (Adult and Pediatric)  Goal: *Acute Goals and Plan of Care (Insert Text)  Description: FUNCTIONAL STATUS PRIOR TO ADMISSION: Patient was independent and active without use of DME.    HOME SUPPORT PRIOR TO ADMISSION: The patient lived with wife but did not require assist.    Physical Therapy Goals  Initiated 12/13/2022  1. Patient will move from supine to sit and sit to supine  in bed with independence within 7 day(s). 2.  Patient will transfer from bed to chair and chair to bed with independence using the least restrictive device within 7 day(s). 3.  Patient will perform sit to stand with independence within 7 day(s). 4.  Patient will ambulate with independence for 150 feet with the least restrictive device within 7 day(s). 5.  Patient will ascend/descend 12 stairs with left handrail(s) with independence within 7 day(s). Outcome: Progressing Towards Goal     PHYSICAL THERAPY TREATMENT  Patient: Singh Reynoso (76 y.o. male)  Date: 12/15/2022  Diagnosis: CHF (congestive heart failure) ([facility]) [I50.9] <principal problem not specified>      Precautions: Fall  Chart, physical therapy assessment, plan of care and goals were reviewed. ASSESSMENT  Patient continues with skilled PT services and is progressing towards goals. Pt agreeable to therapy. Pt was able to increase mobility and independence. Pt ambulated 150 feet and performed 8 steps then ambulated another 150 feet with 8 steps with out rest break. Pt is  making good progression and at this time would recommend HHPT . Current Level of Function Impacting Discharge (mobility/balance): SBA    Other factors to consider for discharge: activity tolerance          PLAN :  Patient continues to benefit from skilled intervention to address the above impairments. Continue treatment per established plan of care. to address goals.     Recommendation for discharge: (in order for the patient to meet his/her long term goals)  Physical therapy at least 2 days/week in the home     This discharge recommendation:  Has not yet been discussed the attending provider and/or case management    IF patient discharges home will need the following DME: none       SUBJECTIVE:   Patient stated I can go again.     OBJECTIVE DATA SUMMARY:   Critical Behavior:  Neurologic State: Alert  Orientation Level: Oriented X4  Cognition: Appropriate decision making, Follows commands  Safety/Judgement: Fall prevention, Home safety  Functional Mobility Training:  Bed Mobility:                    Transfers:  Sit to Stand: Supervision  Stand to Sit: Supervision                             Balance:  Sitting: Intact  Standing: Impaired  Standing - Static: Good  Standing - Dynamic : Good  Ambulation/Gait Training:  Distance (ft): 300 Feet (ft)  Assistive Device: Gait belt  Ambulation - Level of Assistance: Stand-by assistance        Gait Abnormalities: Decreased step clearance (externally rotate)        Base of Support: Widened        Step Length: Right shortened;Left shortened                    Stairs:  Number of Stairs Trained: 8 (x2 trials)  Stairs - Level of Assistance: Contact guard assistance;Stand-by assistance   Rail Use: Right     Therapeutic Exercises:     Pain Rating:  none    Activity Tolerance:   Improving     After treatment patient left in no apparent distress:   Sitting in chair, Call bell within reach, and Bed / chair alarm activated    COMMUNICATION/COLLABORATION:   The patients plan of care was discussed with: Registered nurse and Physician.      Martin Moritz, PTA   Time Calculation: 24 mins

## 2022-12-15 NOTE — PROGRESS NOTES
Occupational Therapy    Attempted to see patient for OT session on this date. Patient with multiple family members at bedside, requesting to finish visit and eat lunch, politely declining session. Will reattempt as able.      Naty Granados, MARJAN, OTR/L

## 2022-12-16 VITALS
WEIGHT: 126.54 LBS | BODY MASS INDEX: 18.74 KG/M2 | SYSTOLIC BLOOD PRESSURE: 106 MMHG | OXYGEN SATURATION: 98 % | RESPIRATION RATE: 18 BRPM | DIASTOLIC BLOOD PRESSURE: 42 MMHG | TEMPERATURE: 97.3 F | HEIGHT: 69 IN | HEART RATE: 90 BPM

## 2022-12-16 LAB
ANION GAP SERPL CALC-SCNC: 8 MMOL/L (ref 5–15)
BUN SERPL-MCNC: 43 MG/DL (ref 6–20)
BUN/CREAT SERPL: 27 (ref 12–20)
CALCIUM SERPL-MCNC: 8.7 MG/DL (ref 8.5–10.1)
CHLORIDE SERPL-SCNC: 104 MMOL/L (ref 97–108)
CO2 SERPL-SCNC: 22 MMOL/L (ref 21–32)
CREAT SERPL-MCNC: 1.57 MG/DL (ref 0.7–1.3)
ERYTHROCYTE [DISTWIDTH] IN BLOOD BY AUTOMATED COUNT: 17.6 % (ref 11.5–14.5)
GLUCOSE BLD STRIP.AUTO-MCNC: 230 MG/DL (ref 65–117)
GLUCOSE SERPL-MCNC: 214 MG/DL (ref 65–100)
HCT VFR BLD AUTO: 27.6 % (ref 36.6–50.3)
HGB BLD-MCNC: 8.5 G/DL (ref 12.1–17)
MAGNESIUM SERPL-MCNC: 2.6 MG/DL (ref 1.6–2.4)
MCH RBC QN AUTO: 23.5 PG (ref 26–34)
MCHC RBC AUTO-ENTMCNC: 30.8 G/DL (ref 30–36.5)
MCV RBC AUTO: 76.5 FL (ref 80–99)
NRBC # BLD: 0 K/UL (ref 0–0.01)
NRBC BLD-RTO: 0 PER 100 WBC
PHOSPHATE SERPL-MCNC: 4.1 MG/DL (ref 2.6–4.7)
PLATELET # BLD AUTO: 205 K/UL (ref 150–400)
PMV BLD AUTO: 11.8 FL (ref 8.9–12.9)
POTASSIUM SERPL-SCNC: 4 MMOL/L (ref 3.5–5.1)
RBC # BLD AUTO: 3.61 M/UL (ref 4.1–5.7)
SERVICE CMNT-IMP: ABNORMAL
SODIUM SERPL-SCNC: 134 MMOL/L (ref 136–145)
WBC # BLD AUTO: 6.7 K/UL (ref 4.1–11.1)

## 2022-12-16 PROCEDURE — 83735 ASSAY OF MAGNESIUM: CPT

## 2022-12-16 PROCEDURE — 82962 GLUCOSE BLOOD TEST: CPT

## 2022-12-16 PROCEDURE — 36415 COLL VENOUS BLD VENIPUNCTURE: CPT

## 2022-12-16 PROCEDURE — 74011250636 HC RX REV CODE- 250/636: Performed by: HOSPITALIST

## 2022-12-16 PROCEDURE — 74011000250 HC RX REV CODE- 250: Performed by: STUDENT IN AN ORGANIZED HEALTH CARE EDUCATION/TRAINING PROGRAM

## 2022-12-16 PROCEDURE — 74011250637 HC RX REV CODE- 250/637: Performed by: STUDENT IN AN ORGANIZED HEALTH CARE EDUCATION/TRAINING PROGRAM

## 2022-12-16 PROCEDURE — 84100 ASSAY OF PHOSPHORUS: CPT

## 2022-12-16 PROCEDURE — 80048 BASIC METABOLIC PNL TOTAL CA: CPT

## 2022-12-16 PROCEDURE — 93005 ELECTROCARDIOGRAM TRACING: CPT

## 2022-12-16 PROCEDURE — 74011250637 HC RX REV CODE- 250/637: Performed by: NURSE PRACTITIONER

## 2022-12-16 PROCEDURE — 85027 COMPLETE CBC AUTOMATED: CPT

## 2022-12-16 RX ORDER — FUROSEMIDE 40 MG/1
40 TABLET ORAL DAILY
Qty: 30 TABLET | Refills: 0 | Status: SHIPPED | OUTPATIENT
Start: 2022-12-16 | End: 2022-12-19 | Stop reason: SDUPTHER

## 2022-12-16 RX ADMIN — IVABRADINE 2.5 MG: 5 TABLET, FILM COATED ORAL at 10:28

## 2022-12-16 RX ADMIN — Medication 10 ML: at 05:23

## 2022-12-16 RX ADMIN — ENOXAPARIN SODIUM 30 MG: 100 INJECTION SUBCUTANEOUS at 08:53

## 2022-12-16 RX ADMIN — EMPAGLIFLOZIN 10 MG: 10 TABLET, FILM COATED ORAL at 10:28

## 2022-12-16 RX ADMIN — ASPIRIN 81 MG: 81 TABLET, COATED ORAL at 08:53

## 2022-12-16 RX ADMIN — CLOPIDOGREL BISULFATE 75 MG: 75 TABLET ORAL at 08:53

## 2022-12-16 NOTE — PROGRESS NOTES
Attempted to schedule hospital follow up PCP appointment. Office /Nurse will follow up with PCP to Secure appointment and contact the patient with appointment information. Pending patient discharge.  Princess Frausto, Care Management Assistant

## 2022-12-16 NOTE — DISCHARGE SUMMARY
Physician Discharge Summary     Patient ID:    Jomar Abbott  [id number]  :  1950    Admit date: 2022    Discharge date and time: 2022    Hospital Diagnoses and Treatment Rendered:   HPI:  Jomar Abbott is a 71 y.o. male with hx of CAD s/p CABG, ischemic cardiomyopathy, CHF with reduced EF, Hypertension, Chronic Kidney Disease Stage III, Peripheral Vascular Disease, and DM Type II, who presented to the hospital with chief complaint of shortness of breath and chest tightness. He was last admitted  to  for CAD and diagnostic left heart cath. Since discharge, he reports progressive dyspnea with minimal exertion, chest tightness and orthopnea. Trial of sublingual nitro prior to arriving to ED offered no relief in symptoms. HOSPITAL COURSE:  Acute hypoxic respiratory failure, present on admission:  - initially placed on BiPAP, then NC;  - weaned off O2, on RA at the time of discharge;  - attributed to fluid overload/ pulmonary edema/ decompensated HFrEF last EF 35 to 40% and aortic stenosis;  - infectious work-up negative; afebrile, no leukocytosis;  - improved with diuresis;   - diuresed w/ Lasix 40mg IV BID;  - Cardiology consulted;   - Hypotension: possibly due to overdiuresis: beta-blocker and Entresto held;   - Started on Ivabradine on ;   - 12/15: BP remains low, SBP in the 80's; Lasix was changed to PO; change Flomax to HS; patient is not on any other BP medications; monitor orthostatics;   - appreciate Cardiology follow-up and recommendations;   - discharge home on [facility];   - outpatient follow-up with Cardiology: Dr. Jesus Bennett;      CAD of native vessels with unstable angina and NSTEMI  - hx of CABG in 2018;  - cath 2021: open LIMA, PCI to OM 2 all the way up to the left main 2022.  Cath  2022 showed open LIMA and RCA graft, some in-stent restenosis in the long graft from the left main to OM 2 but not severe and the OM1 was more severely diseased in the small vessel.  - Continue aspirin and Plavix   - Event for revascularization versus goal-directed medical therapy per cardiology  - Unable to tolerate beta-blocker due to hypotension;  - Unable to tolerate Entresto due to hypotension and hyperkalemia (present on admission, now resolved);   - started Corlanor on 12/14;      BPH:  - continue Flomax     CKD Stage IV:  - stable, Cr 1.57 at discharge;  - continue Lasix at discharge;         Chronic Diagnoses:    Problem List as of 12/16/2022 Date Reviewed: 12/5/2022            Codes Class Noted - Resolved    RESOLVED: NSTEMI (non-ST elevated myocardial infarction) ([email or web address] ICD-10-CM: I21.4  ICD-9-CM: 410.70  6/22/2018 - 3/7/2019        Dyslipidemia, goal LDL below 70 ICD-10-CM: E78.5  ICD-9-CM: 272.4  7/30/2017 - Present        RESOLVED: Statin intolerance ICD-10-CM: Z78.9  ICD-9-CM: 995.27  7/30/2017 - 5/20/2021        CHF (congestive heart failure) ([facility]) ICD-10-CM: I50.9  ICD-9-CM: 428.0  12/12/2022 - Present        NSTEMI (non-ST elevated myocardial infarction) ([facility] [email or web address] 75.) ICD-10-CM: I21.4  ICD-9-CM: 410.70  12/5/2022 - Present        CKD stage 3 due to type 2 diabetes mellitus ([facility] [[email or web address] 75.) ICD-10-CM: E11.22, N18.30  ICD-9-CM: 250.40, 585.3  1/21/2021 - Present        S/P CABG x 3 ICD-10-CM: Z95.1  ICD-9-CM: V45.81  7/9/2018 - Present    Overview Signed 7/9/2018 11:20 AM by SCAR Ballard     Coronary Artery Bypass Grafting x 3, LIMA to LAD, RSVG to OM, RSVG to RCA  Right V [facility]             Coronary artery disease involving native coronary artery of native heart without angina pectoris ICD-10-CM: I25.10  ICD-9-CM: 414.01  7/30/2017 - Present        Hypertension, essential ICD-10-CM: I10  ICD-9-CM: 401.9  7/30/2017 - Present        Colon cancer screening ICD-10-CM: Z12.11  ICD-9-CM: V76.51  5/30/2017 - Present        Nonrheumatic aortic valve stenosis ICD-10-CM: I35.0  ICD-9-CM: 424.1  9/21/2016 - Present        Bruit of right carotid artery ICD-10-CM: R09.89  ICD-9-CM: 785.9  9/21/2016 - Present        Type 2 diabetes mellitus with hyperglycemia ([place] [id number].) ICD-10-CM: E11.65  ICD-9-CM: 250.00  5/26/2015 - Present        Deafness ICD-10-CM: H91.90  ICD-9-CM: 389.9  10/28/2012 - Present        Cramp of limb ICD-10-CM: R25.2  ICD-9-CM: 729.82  3/26/2012 - Present        RESOLVED: Type 2 diabetes with nephropathy ([facility] 75.) ICD-10-CM: E11.21  ICD-9-CM: 250.40, 583.81  9/15/2018 - 3/7/2019        RESOLVED: Type 2 diabetes mellitus with nephropathy ([facility] 75.) ICD-10-CM: E11.21  ICD-9-CM: 250.40, 583.81  12/14/2017 - 3/16/2018        RESOLVED: Dyspnea on exertion ICD-10-CM: R06.09  ICD-9-CM: 786.09  6/23/2017 - 9/15/2018        RESOLVED: Preop general physical exam ICD-10-CM: B16.287  ICD-9-CM: V72.83  1/5/2017 - 9/25/2019        RESOLVED: NSTEMI (non-ST elevated myocardial infarction) ([facility].) ICD-10-CM: I21.4  ICD-9-CM: 410.70  11/10/2016 - 3/16/2018        RESOLVED: Essential hypertension with goal blood pressure less than 130/85 ICD-10-CM: I10  ICD-9-CM: 401.9  5/26/2015 - 9/15/2018        RESOLVED: Deafness ICD-10-CM: H91.90  ICD-9-CM: 389.9  10/28/2012 - 10/28/2012        RESOLVED: DM (diabetes mellitus) ([facility].) ICD-10-CM: E11.9  ICD-9-CM: 250.00  3/26/2012 - 5/26/2015           Discharge Medications:   Current Discharge Medication List        START taking these medications    Details   ivabradine (CORLANOR) 5 mg tablet Take 0.5 Tablets by mouth two (2) times daily (with meals) for 30 days. Qty: 30 Tablet, Refills: 0  Start date: 12/16/2022, End date: 1/15/2023      furosemide (LASIX) 40 mg tablet Take 1 Tablet by mouth daily for 30 days. Qty: 30 Tablet, Refills: 0  Start date: 12/16/2022, End date: 1/15/2023           CONTINUE these medications which have NOT CHANGED    Details   dapagliflozin (FARXIGA) 10 mg tab tablet Take 1 Tablet by mouth daily.   Qty: 90 Tablet, Refills: 1      glipiZIDE (GLUCOTROL) 5 mg tablet Take 1 tablet by mouth twice daily  Qty: 180 Tablet, Refills: 1    Associated Diagnoses: Type 2 diabetes mellitus with hyperglycemia, without long-term current use of insulin (HCC)      tamsulosin (FLOMAX) 0.4 mg capsule Take 1 capsule by mouth once daily  Qty: 60 Capsule, Refills: 0    Associated Diagnoses: BPH associated with nocturia      ipratropium (ATROVENT) 21 mcg (0.03 %) nasal spray 2 Sprays by Both Nostrils route every twelve (12) hours. Qty: 30 mL, Refills: 0    Associated Diagnoses: Bronchitis      nitroglycerin (NITROSTAT) 0.4 mg SL tablet 1 Tablet by SubLINGual route every five (5) minutes as needed for Chest Pain. Up to 3 doses. Qty: 25 Tablet, Refills: 3    Associated Diagnoses: Coronary artery disease involving native coronary artery of native heart without angina pectoris      ergocalciferol (ERGOCALCIFEROL) 1,250 mcg (50,000 unit) capsule Take 1 Capsule by mouth every seven (7) days. Qty: 12 Capsule, Refills: 0    Associated Diagnoses: Vitamin D deficiency      Januvia 50 mg tablet Take 1 tablet by mouth once daily  Qty: 90 Tablet, Refills: 0      polyethylene glycol (MIRALAX) 17 gram/dose powder Take 17 g by mouth daily. Qty: 510 g, Refills: 0    Associated Diagnoses: Chronic constipation      clopidogreL (Plavix) 75 mg tab Take 1 Tablet by mouth daily for 360 days. Indications: a sudden worsening of angina called acute coronary syndrome  Qty: 30 Tablet, Refills: 11      rosuvastatin (CRESTOR) 20 mg tablet Take 1 Tablet by mouth nightly. Qty: 90 Tablet, Refills: 3      aspirin delayed-release 81 mg tablet Take 1 Tab by mouth. STOP taking these medications       sacubitriL-valsartan (Entresto) 24-26 mg tablet Comments:   Reason for Stopping:         metFORMIN (GLUCOPHAGE) 1,000 mg tablet Comments:   Reason for Stopping:         Jardiance 25 mg tablet Comments:   Reason for Stopping:         albendazole (ALBENZA) 200 mg tablet Comments:   Reason for Stopping: Follow up Care:    1. Qiana Smith MD in 1-2 weeks. Please call to set up an appointment shortly after discharge. Diet:  Cardiac Diet and Diabetic Diet    Disposition:  Home with [facility]; Advanced Directive:   FULL    DNR      Discharge Exam:  I had a face to face encounter with this patient and independently examined them on 12/16/2022 as outlined below:                                                     General : alert x 3, awake, no acute distress, resting in bed, pleasant   HEENT: PEERL, EOMI, moist mucus membrane, TM clear  Neck: supple, no JVD, no meningeal signs  Chest: Clear to auscultation bilaterally   CVS: S1 S2 heard, Capillary refill less than 2 seconds  Abd: soft/ Non tender, non distended, BS physiological,   Ext: no clubbing, no cyanosis, no edema, brisk 2+ DP pulses  Neuro/Psych: pleasant mood and affect, CN 2-12 grossly intacT  Skin: warm        CONSULTATIONS: Cardiology    Significant Diagnostic Studies:   12/11/2022: BUN 30 MG/DL (H; Ref range: 6 - 20 MG/DL); Calcium 9.0 MG/DL (Ref range: 8.5 - 10.1 MG/DL); CO2 20 mmol/L (L; Ref range: 21 - 32 mmol/L); Creatinine 1.63 MG/DL (H; Ref range: 0.70 - 1.30 MG/DL); Glucose 295 mg/dL (H; Ref range: 65 - 100 mg/dL); HCT 36.1 % (L; Ref range: 36.6 - 50.3 %); HGB 10.8 g/dL (L; Ref range: 12.1 - 17.0 g/dL); Potassium 6.0 mmol/L (H; Ref range: 3.5 - 5.1 mmol/L); Sodium 134 mmol/L (L; Ref range: 136 - 145 mmol/L)  12/12/2022: BUN 32 MG/DL (H; Ref range: 6 - 20 MG/DL); Calcium 8.5 MG/DL (Ref range: 8.5 - 10.1 MG/DL); CO2 19 mmol/L (L; Ref range: 21 - 32 mmol/L); Creatinine 1.86 MG/DL (H; Ref range: 0.70 - 1.30 MG/DL); Glucose 235 mg/dL (H; Ref range: 65 - 100 mg/dL); HCT 28.9 % (L; Ref range: 36.6 - 50.3 %); HGB 8.3 g/dL (L; Ref range: 12.1 - 17.0 g/dL); Potassium 5.2 mmol/L (H; Ref range: 3.5 - 5.1 mmol/L);  Sodium 136 mmol/L (Ref range: 136 - 145 mmol/L)  Recent Labs     12/16/22  0512   WBC 6.7   HGB 8.5*   HCT 27.6*        Recent Labs     12/16/22  0512 12/14/22  0042 12/13/22  1549   * 135* --    K 4.0 4.2 4.8    102  --    CO2 22 24  --    BUN 43* 43*  --    CREA 1.57* 1.87*  --    * 197*  --    CA 8.7 9.2  --    MG 2.6*  --   --    PHOS 4.1  --   --      No results for input(s): AP, TBIL, TP, ALB, GLOB, GGT, AML, LPSE in the last 72 hours. No lab exists for component: SGOT, GPT, AMYP, HLPSE  No results for input(s): INR, PTP, APTT, INREXT in the last 72 hours. No results for input(s): FE, TIBC, PSAT, FERR in the last 72 hours. No results for input(s): PH, PCO2, PO2 in the last 72 hours. No results for input(s): CPK, CKMB in the last 72 hours.     No lab exists for component: TROPONINI  No components found for: [place]      Discharge time: >35 minutes;     Signed:  Valerie Roman MD  12/16/2022  10:21 AM  .

## 2022-12-16 NOTE — PROGRESS NOTES
[facility]  Cardiology Care Note                  []Initial visit     [x]Established visit     Patient Name: Beata DA SILVA:[id number]  Primary Cardiologist: Marietta Resendez MD  Consulting Cardiologist: Yamilet Law MD     Reason for initial visit:  dyspnea, decompensated HF, tachycardia. HPI:     CAD with CABG 6/9/2018. NSTEMI in November 2016 and resolved pulmonary HTN. Stent to circumflex an LAD in 2016. Stress echo in 2018 with a drop in BP with exercise and a drop in EF. Cath on 6/22/18 that showed even with a 5F catheter, he dampened with suspected ostial 3 vessel disease. Echo 3/27/2021 = EF 55 to 60% mild AR MR TR LAE MAC  Nuclear 3/18/2021 EF 64% medium size defect apical and inferior lateral reversible ischemia medium defect mid and inferolateral nonreversible appear to be infarction intermediate risk stress test .  PCI 02/28/2022--patent LIMA to LAD, vein graft to distal RCA with severe disease in the native vessels Occluded vein graft to OM 2. Native OM 2 severely diseased along with proximal to mid circumflex as well as distal left main. Overall the vessel is significantly remodeled negatively. Additionally there is significant disease in the first marginal branch which is even smaller as well as AV groove branch right at the takeoff of OM2. Single stent 2.25 x 28 mm Xience was placed extending from the OM 2 into the circumflex into the distal left main and sequentially dilated with 2.25 noncompliant, 2 5 noncompliant, 3 oh noncompliant and 3 5 noncompliant to perform multilevel POT to manage multiple bifurcations on the way. Atherectomy was considered but given small size of the vessels, was not deemed to be absolutely safe for the obtuse marginal lesion. Overall stent expanded fairly well related to the size of the vessels.  Normal LVEDP  Nuclear stress October 27, 2022 test showed ischemia similar to 3/15/2021 with a medium size defect in the mid to distal inferolateral and anterolateral segments in the circumflex territory he had septal dyskinesia with an EF of 39%. He had a fixed apical infarct  Echocardiogram October 27, 2022 showed an EF of 45 to 50% TAPSE at 1.2 showing reduced RV function sclerosis of the aortic valve with stenosis that was very mild with a valve area of mean of 7 mmHg and HUY of 1.4 cm². The mitral was thickened with restricted mobility and mild MR.      SUBJECTIVE:    No new problems. Feels great. Assessment and Plan       Dyspnea/decompensated HFrEF- tachycardic,Chest CT with Overall mild increase diffuse interlobular septal thickening and patchy bilateral lung opacities in keeping with pulmonary edema with superimposed infection/inflammation not excluded. Diurese with IV lasix. Unlikely to be a candidate for Entresto given severe hypotension and hyperkalemia. Hold BB since BP still low. Continue corlanor. Change IV lasix to po. Started Jardiance. Bps stable. HR improved. Cr stable, improving at 1.57. [id number] Grace Payan for discharge with follow up next week. Reviewed QTC, not significantly increased with Corlanor. To be continued at discharge. 2  CAD native vessel with unstable angina and NSTEMI  Prior CABG in 2018, cath 9/8/2021 open LIMA, PCI to OM 2 all the way up to the left main 2/28/2022. Cath yesterday 12/6/2022 showed open LIMA and RCA graft some in-stent restenosis in the long graft from the left main to OM 2 but not severe and the OM1 was more severely diseased in the small vessel. Interventional cardiology felt no reasonable vessel to intervene and recommended medical management.   The OM disease is consistent with a lateral wall ischemia demonstrated on prior nuclear stress test.  Continue aspirin and Plavix noted mild anemia-hemoglobin on admission (10.8)    3  Cardiomyopathy systolic heart failure: Rapid deterioration in LV function with overall WMA on recent echo appears not to be in teritorry of ischemia suggesting alternative mechanism fo cardiomyopathy(stress SMP vs myocarditis). Basal segments appear to be hyperdynamic compared to apical segments which may point towards a possibility of stress cardiomyopathy. Notably apex is significantly hypokinetic but LIMA to LAD is patent on recent cardiac catheterization hence cardiomyopathy is unlikely to be ischemia driven. .    4.  Aortic stenosis mild mean gradient of 7 nn Hg, HUY 1.4 by echo 11/27/2022 -mild , murmur     5. Diabetes mellitus type 2 previously managed by hospitalist service now has a glucose of 377 patient is currently on antihyperglycemic's and will involve diabetes treatment center for assistance  On insulin lispro and alogliptin    6  Hypertension previously hypertensive now with low blood pressure  Will review meds and consider changes based on optimal medical therapy for congestive heart failure    7. 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 Likely secondary to hypotension and entresto. We will stop Entresto. Continue Lasix now in the form of p.o. going forward. Restart SGLT2 inhibitors. Follow-up in 1 week with Dr. Clari Sierra. PAD: S/P Right SFA PTCA. Needs follow up with Dr Rosalinda Tejada in 2 -3 months with MARIANELA/Dopplers    Future Appointments   Date Time Provider Mina Sandy   12/19/2022  8:20 AM Sue Mccormick NP [facility]   2/1/2023  3:00 PM LIDATMARY ANN DIOP BS AMB   2/1/2023  3:40 PM Monik Howard MD [facility] AM[facility]   2/14/2023  8:40 AM MD HI Blanc BS AMB   3/15/2023  1:00 PM MARY ANN KEITH BS AMB   3/15/2023  1:40 PM Baylee Green MD [facility]         8 XOL  Statin intolerant-improved. Interesting that he had such progression of coronary disease despite being relatively low and weight not sedentary and having to low LDL on a high potency statin.   If his LDL was higher I consider adding a PCSK9 but I cannot really recommend that given his LDL 41. At goal , denies excess muscle aches or new liver issues               ____________________________________________________________    Cardiac testing  10/27/22    ECHO ADULT COMPLETE 10/27/2022 10/27/2022    Interpretation Summary    Left Ventricle: Mildly reduced left ventricular systolic function with a visually estimated EF of 45 - 50%. Left ventricle size is normal. Normal wall thickness. Normal wall motion. Abnormal diastolic function. Right Ventricle: Reduced systolic function. TAPSE is 1.2 cm. Aortic Valve: Valve structure is normal. Mild sclerosis of the aortic valve cusp. Mild annular calcification. Mild regurgitation. Mild stenosis of the aortic valve. AV mean gradient is 7 mmHg. AV peak gradient is 13 mmHg. LVOT:AV VTI Index is 0.55. AV area by peak velocity is 1.4 cm2. Mitral Valve: Moderately thickened leaflet. Mild annular calcification of the mitral valve. The posterior leaflet of the mitral valve has restricted mobility. Mild regurgitation. Left Atrium: Left atrium is dilated. Signed by: Cecilia Ruiz MD on 10/27/2022  4:49 PM        10/27/22    NUCLEAR CARDIAC STRESS TEST 10/27/2022 11/1/2022    Interpretation Summary    Nuclear Findings: The study is positive for myocardial ischemia. The defect appears to be ischemia. The areas of ischemia are similar to 3/15/2021 study. Nuclear Findings: There is a moderate severity left ventricular stress perfusion defect that is medium in size present in the mid to distal inferolateral and anterolateral segment(s) that is reversible. The defect is consistent with abnormal perfusion in the LCx territory. There is normal wall motion in the defect area. The defect appears to be probable ischemia. There is a moderate severity second left ventricular stress perfusion defect. The defect appears to be infarction.     Nuclear Findings: Abnormal left ventricular systolic function post-stress. Septal dyskinesis. Post-stress ejection fraction is 39%. ECG: Resting ECG demonstrates normal sinus rhythm. ECG: Stress ECG was negative for ischemia. Stress Test: A pharmacological stress test was performed using lexiscan. Blood pressure demonstrated a normal response and heart rate demonstrated a normal response to stress. The patient's heart rate recovery was normal. The patient reported no symptoms during the stress test.    Signed by: Epi Hillman MD on 10/27/2022  4:45 PM    02/28/22    CARDIAC PROCEDURE 02/28/2022 2/28/2022    Conclusion  Findings  1. Severe native multivessel coronary disease  2. Patent LIMA to LAD, vein graft to distal RCA with severe disease in the native vessels  3. Occluded vein graft to OM 2. Native OM 2 severely diseased along with proximal to mid circumflex as well as distal left main. Overall the vessel is significantly remodeled negatively. Additionally there is significant disease in the first marginal branch which is even smaller as well as AV groove branch right at the takeoff of OM2. Single stent 2.25 x 28 mm Xience was placed extending from the OM 2 into the circumflex into the distal left main and sequentially dilated with 2.25 noncompliant, 2 5 noncompliant, 3 oh noncompliant and 3 5 noncompliant to perform multilevel POT to manage multiple bifurcations on the way. Atherectomy was considered but given small size of the vessels, was not deemed to be absolutely safe for the obtuse marginal lesion. Overall stent expanded fairly well related to the size of the vessels. 4.  Normal LVEDP    Access right radial: Small vessel, tortuous} brachiocephalic  Contrast 65 cc    Recommendations  1. Plavix based dual antiplatelet therapy  2. Guideline directed medical therapy for secondary prevention of coronary events    Signed by:  Phill Collier MD on 2/28/2022  4:22 PM        Most recent HS troponins:  No results for input(s): TROPHS in the last 72 hours. No lab exists for component:  CKMB      Review of Systems    [x]All other systems reviewed and all negative except as written in HPI    [] Patient unable to provide secondary to condition         Past Medical History:   Diagnosis Date    CAD (coronary artery disease) 11/10/2016    NSTEMI & 2 stents    Deafness 10/28/2012    DM (diabetes mellitus) ([facility] [address]     Elevated cholesterol     Hypertension     NSTEMI (non-ST elevated myocardial infarction) ([facility] [address] 11/10/2016     Past Surgical History:   Procedure Laterality Date    COLONOSCOPY N/A 6/28/2018    COLONOSCOPY performed by Vargas Garcia MD at [address]  11/11/2016    2 stents     Social Hx:  reports that he has never smoked. He has never been exposed to tobacco smoke. He has never used smokeless tobacco. He reports current alcohol use of about 2.0 standard drinks per week. He reports that he does not use drugs. Family Hx: family history includes Elevated Lipids in his brother, brother, and brother; Heart Attack in his father; Heart Disease in his father; Hypertension in his mother; No Known Problems in his daughter, sister, and son.   No Known Allergies       OBJECTIVE:  Wt Readings from Last 3 Encounters:   12/16/22 126 lb 8.7 oz (57.4 kg)   12/05/22 130 lb (59 kg)   12/05/22 130 lb 3.2 oz (59.1 kg)       Intake/Output Summary (Last 24 hours) at 12/16/2022 0916  Last data filed at 12/16/2022 0200  Gross per 24 hour   Intake --   Output 200 ml   Net -200 ml           Physical Exam    Vitals:   Vitals:    12/16/22 0400 12/16/22 0600 12/16/22 0740 12/16/22 0805   BP: (!) 100/49   (!) 100/42   Pulse: 93 89  84   Resp: 16   18   Temp: 98.4 °F (36.9 °C)   97.4 °F (36.3 °C)   SpO2: 96%   97%   Weight:   126 lb 8.7 oz (57.4 kg)    Height:         Telemetry: normal sinus rhythm    BP (!) 100/42 (BP 1 Location: Left upper arm, BP Patient Position: Sitting)   Pulse 84   Temp 97.4 °F (36.3 °C) Resp 18   Ht 5' 9\" (1.753 m)   Wt 126 lb 8.7 oz (57.4 kg)   SpO2 97%   BMI 18.69 kg/m²   General:    Alert, cooperative, no distress. Psychiatric:    Normal Mood and affect    Eye/ENT:      Pupils equal, No asymmetry, Conjunctival pink. Able to hear voice at normal amplitude   Lungs:      Visibly symmetric chest expansion, No palpable tenderness. clear   Heart[de-identified]    Regular rate and rhythm, S1, S2 normal, no murmur, click, rub or gallop. No JVD, Normal palpable peripheral pulses. No cyanosis   Abdomen:     Soft, non-tender. Bowel sounds normal. No masses,  No      organomegaly. Extremities:   Extremities normal, atraumatic, no edema. Neurologic:   CN II-XII grossly intact. No gross focal deficits           Data Review:     Radiology:   XR Results (most recent):  Results from Hospital Encounter encounter on 12/11/22    XR CHEST PORT    Narrative  EXAM:  XR CHEST PORT    INDICATION: Chest pain and shortness of breath    COMPARISON: 12/5/2022    TECHNIQUE: Portable AP upright chest view at 2315 hours    FINDINGS: The cardiomediastinal contours are stable. There are increased diffuse interstitial and patchy airspace opacities. There  are small pleural effusions. There is no pneumothorax. The bones and upper  abdomen are stable. Impression  Increased diffuse interstitial and patchy airspace opacities can be seen with  pulmonary edema or infection. Small pleural effusions. CT Results (most recent):  Results from Hospital Encounter encounter on 12/11/22    CT CHEST WO CONT    Narrative  EXAM:  CT CHEST WO CONT    INDICATION:  Shortness of breath    COMPARISON: Radiograph 12/11/2022. CT 12/5/2022. TECHNIQUE: Helical CT of the chest is performed without intravenous contrast.  Coronal and sagittal reformatted images are obtained. CT dose reduction was  achieved through use of a standardized protocol tailored for this examination  and automatic exposure control for dose modulation.  Adaptive statistical  iterative reconstruction (ASIR) was utilized. FINDINGS:  The visualized thyroid gland is unremarkable. The aorta and main pulmonary  artery are stable in caliber. CABG surgical changes are noted. The heart size is  stable. There is no pericardial effusion. There are no enlarged axillary, mediastinal, or hilar lymph nodes. There is slightly increased small to moderate pleural effusions. Diffuse  interlobular septal thickening with patchy bilateral lung opacities is mildly  increased since 12/5/2022. There is cholelithiasis. The right adrenal nodule is unchanged. There is no  acute abnormality in the visualized upper abdomen. There are degenerative  changes in the spine. There is no aggressive bony lesion. Impression  1. Slightly increased small to moderate pleural effusions. 2. Overall mild increase diffuse interlobular septal thickening and patchy  bilateral lung opacities in keeping with pulmonary edema with superimposed  infection/inflammation not excluded. Follow up to resolution is suggested. MRI Results (most recent):  Results from [place] encounter on 05/22/16    MRI LUMB SPINE WO CONT    Narrative  **Final Report**      ICD Codes / Adm. Diagnosis: 724.2  M54.5 / Lumbago  Low back pain  Examination:  MR Ebony German CON  - [id number] - May 22 2016  1:45PM  Accession No:  [id number]  Reason:  Low back pain      REPORT:  Indication: Pain and back extends into right leg to foot    Exam: MRI of the lumbar spine. Sequences include sagittal and axial T1 and  T2-weighted images. Sagittal STIR. Comparisons: April 27, 2016    Contrast: None    Findings: Alignment is normal. There is no evidence of marrow replacement or  acute fracture. Cord terminus is within normal limits. Paraspinous soft  tissues are within normal limits.     T12-L1: No stenosis    L1-L2: There is desiccation of this disc with a small bulge but no stenosis    L2-L3: There is desiccation of this disc with a small bulge but no stenosis    L3-L4: There is mild left facet arthropathy but no stenosis    L4-L5: There is disc height loss with a small disc bulge. Facet arthropathy. No stenosis    L5-S1: There is disc height loss with a small disc bulge and left  paracentral disc extrusion extending inferiorly. This mildly distorts the  left S1 nerve root in the subarticular zone and left lateral recess. There  are facet degenerative changes with moderate left and mild-to-moderate right  foraminal narrowing    Impression  :  1. Multilevel degenerative change detailed by level above most significant  at L5-S1                Signing/Reading Doctor: Chloe Davison ([id number])  Approved: Chloe Davison ([id number])  May 23 2016  8:39AM        No results for input(s): CPK, TROIQ in the last 72 hours. No lab exists for component: CKQMB, CPKMB, BMPP  Recent Labs     12/16/22  0512 12/14/22  0042   * 135*   K 4.0 4.2    102   CO2 22 24   BUN 43* 43*   CREA 1.57* 1.87*   * 197*   PHOS 4.1  --    CA 8.7 9.2     Recent Labs     12/16/22  0512   WBC 6.7   HGB 8.5*   HCT 27.6*        No results for input(s): PTP, INR, AP, INREXT, INREXT in the last 72 hours. No lab exists for component: PTTP, GPT, SGOT    No results for input(s): CHOL, LDLC in the last 72 hours. No lab exists for component: TGL, HDLC,  HBA1C  No results for input(s): CRP, TSH, TSHEXT, TSHEXT in the last 72 hours.     No lab exists for component: ESR          Current meds:    Current Facility-Administered Medications:     furosemide (LASIX) tablet 40 mg, 40 mg, Oral, DAILY, Naty Diaz, ANP    empagliflozin (JARDIANCE) tablet 10 mg, 10 mg, Oral, DAILY, Naty Diaz, KIMBERLY, 10 mg at 12/15/22 1258    tamsulosin (FLOMAX) capsule 0.4 mg, 0.4 mg, Oral, QHS, Maryana Torres MD, 0.4 mg at 12/15/22 2154    ivabradine (CORLANOR) tablet 2.5 mg, 2.5 mg, Oral, BID WITH MEALS, Naty Diaz, ANP, 2.5 mg at 12/15/22 9635    guaiFENesin (ROBITUSSIN) 100 mg/5 mL oral liquid 100 mg, 100 mg, Oral, Q4H PRN, Ruth Ignacio MD, 100 mg at 12/14/22 1847    zolpidem (AMBIEN) tablet 5 mg, 5 mg, Oral, QHS PRN, Ruth Ignacio MD, 5 mg at 12/15/22 2158    enoxaparin (LOVENOX) injection 30 mg, 30 mg, SubCUTAneous, DAILY, Ruth Ignacio MD, 30 mg at 12/16/22 0853    sodium chloride (NS) flush 5-40 mL, 5-40 mL, IntraVENous, Q8H, Heber Barajas MD, 10 mL at 12/16/22 0523    sodium chloride (NS) flush 5-40 mL, 5-40 mL, IntraVENous, PRN, Heber Barajas MD    acetaminophen (TYLENOL) tablet 650 mg, 650 mg, Oral, Q6H PRN **OR** acetaminophen (TYLENOL) suppository 650 mg, 650 mg, Rectal, Q6H PRN, Heber Barajas MD    polyethylene glycol (MIRALAX) packet 17 g, 17 g, Oral, DAILY PRN, Heber Barajas MD    ondansetron (ZOFRAN ODT) tablet 4 mg, 4 mg, Oral, Q8H PRN **OR** ondansetron (ZOFRAN) injection 4 mg, 4 mg, IntraVENous, Q6H PRN, Heber Barajas MD    aspirin delayed-release tablet 81 mg, 81 mg, Oral, DAILY, Heber Barajas MD, 81 mg at 12/16/22 0853    clopidogreL (PLAVIX) tablet 75 mg, 75 mg, Oral, DAILY, Heber Barajas MD, 75 mg at 12/16/22 0853    rosuvastatin (CRESTOR) tablet 20 mg, 20 mg, Oral, QHS, Heber Barajas MD, 20 mg at 12/15/22 2154    melatonin tablet 3 mg, 3 mg, Oral, QHS PRN, Dileep Reyes NP, 3 mg at 12/15/22 2157    Karen Postal, [facility]  [facility] [address], [address] [facility] [address]  [place], [address]   [phone]      Neli Hui MD

## 2022-12-16 NOTE — PROGRESS NOTES
Problem: Diabetes Self-Management  Goal: *Disease process and treatment process  Description: Define diabetes and identify own type of diabetes; list 3 options for treating diabetes. Outcome: Progressing Towards Goal     Problem: Falls - Risk of  Goal: *Absence of Falls  Description: Document Aby Skill Fall Risk and appropriate interventions in the flowsheet.   Outcome: Progressing Towards Goal  Note: Fall Risk Interventions:  Mobility Interventions: Bed/chair exit alarm, Patient to call before getting OOB, Strengthening exercises (ROM-active/passive), Utilize walker, cane, or other assistive device         Medication Interventions: Bed/chair exit alarm, Patient to call before getting OOB, Teach patient to arise slowly, Utilize gait belt for transfers/ambulation    Elimination Interventions: Bed/chair exit alarm, Call light in reach, Patient to call for help with toileting needs, Toilet paper/wipes in reach, Toileting schedule/hourly rounds, Urinal in reach              Problem: Patient Education: Go to Patient Education Activity  Goal: Patient/Family Education  Outcome: Progressing Towards Goal     Problem: Patient Education: Go to Patient Education Activity  Goal: Patient/Family Education  Outcome: Progressing Towards Goal     Problem: Nutrition Deficit  Goal: *Optimize nutritional status  Outcome: Progressing Towards Goal

## 2022-12-16 NOTE — PROGRESS NOTES
SARA:  RUR: 21%    Disposition:  Home with Family, Home with Home Health PT/OT    CM spoke with patient's wife r d/c today and [facility] agency choice. Wife gives no preference for agencies. Wife to transport home upon completion of home health referral and positive response. Referrals sent. CM awaiting response. [facility], [address] [place] [address] has accepted patient for servicing. Updates placed on After Visit Summary.

## 2022-12-17 LAB
ATRIAL RATE: 99 BPM
BACTERIA SPEC CULT: ABNORMAL
CALCULATED P AXIS, ECG09: 64 DEGREES
CALCULATED R AXIS, ECG10: 68 DEGREES
CALCULATED T AXIS, ECG11: 178 DEGREES
DIAGNOSIS, 93000: NORMAL
P-R INTERVAL, ECG05: 134 MS
Q-T INTERVAL, ECG07: 354 MS
QRS DURATION, ECG06: 74 MS
QTC CALCULATION (BEZET), ECG08: 454 MS
SERVICE CMNT-IMP: ABNORMAL
VENTRICULAR RATE, ECG03: 99 BPM

## 2022-12-19 ENCOUNTER — OFFICE VISIT (OUTPATIENT)
Dept: CARDIOLOGY CLINIC | Age: 72
End: 2022-12-19
Payer: MEDICARE

## 2022-12-19 ENCOUNTER — PATIENT OUTREACH (OUTPATIENT)
Dept: CASE MANAGEMENT | Age: 72
End: 2022-12-19

## 2022-12-19 ENCOUNTER — TRANSCRIBE ORDER (OUTPATIENT)
Dept: SCHEDULING | Age: 72
End: 2022-12-19

## 2022-12-19 VITALS
BODY MASS INDEX: 19.11 KG/M2 | HEIGHT: 69 IN | HEART RATE: 65 BPM | SYSTOLIC BLOOD PRESSURE: 100 MMHG | WEIGHT: 129 LBS | DIASTOLIC BLOOD PRESSURE: 58 MMHG | RESPIRATION RATE: 16 BRPM

## 2022-12-19 DIAGNOSIS — D64.9 ANEMIA, UNSPECIFIED TYPE: ICD-10-CM

## 2022-12-19 DIAGNOSIS — I73.9 PAD (PERIPHERAL ARTERY DISEASE) (HCC): ICD-10-CM

## 2022-12-19 DIAGNOSIS — I50.22 CHRONIC SYSTOLIC CONGESTIVE HEART FAILURE (HCC): ICD-10-CM

## 2022-12-19 DIAGNOSIS — I35.0 NONRHEUMATIC AORTIC VALVE STENOSIS: ICD-10-CM

## 2022-12-19 DIAGNOSIS — E78.5 DYSLIPIDEMIA: ICD-10-CM

## 2022-12-19 DIAGNOSIS — I25.10 CORONARY ARTERY DISEASE INVOLVING NATIVE CORONARY ARTERY OF NATIVE HEART WITHOUT ANGINA PECTORIS: Primary | ICD-10-CM

## 2022-12-19 DIAGNOSIS — N18.31 STAGE 3A CHRONIC KIDNEY DISEASE (HCC): ICD-10-CM

## 2022-12-19 DIAGNOSIS — R91.8 LUNG MASS: Primary | ICD-10-CM

## 2022-12-19 PROCEDURE — 3078F DIAST BP <80 MM HG: CPT | Performed by: NURSE PRACTITIONER

## 2022-12-19 PROCEDURE — G8754 DIAS BP LESS 90: HCPCS | Performed by: NURSE PRACTITIONER

## 2022-12-19 PROCEDURE — G8420 CALC BMI NORM PARAMETERS: HCPCS | Performed by: NURSE PRACTITIONER

## 2022-12-19 PROCEDURE — G8536 NO DOC ELDER MAL SCRN: HCPCS | Performed by: NURSE PRACTITIONER

## 2022-12-19 PROCEDURE — G8427 DOCREV CUR MEDS BY ELIG CLIN: HCPCS | Performed by: NURSE PRACTITIONER

## 2022-12-19 PROCEDURE — 1101F PT FALLS ASSESS-DOCD LE1/YR: CPT | Performed by: NURSE PRACTITIONER

## 2022-12-19 PROCEDURE — 3017F COLORECTAL CA SCREEN DOC REV: CPT | Performed by: NURSE PRACTITIONER

## 2022-12-19 PROCEDURE — 99215 OFFICE O/P EST HI 40 MIN: CPT | Performed by: NURSE PRACTITIONER

## 2022-12-19 PROCEDURE — 1123F ACP DISCUSS/DSCN MKR DOCD: CPT | Performed by: NURSE PRACTITIONER

## 2022-12-19 PROCEDURE — 3074F SYST BP LT 130 MM HG: CPT | Performed by: NURSE PRACTITIONER

## 2022-12-19 PROCEDURE — G8432 DEP SCR NOT DOC, RNG: HCPCS | Performed by: NURSE PRACTITIONER

## 2022-12-19 PROCEDURE — 1111F DSCHRG MED/CURRENT MED MERGE: CPT | Performed by: NURSE PRACTITIONER

## 2022-12-19 PROCEDURE — G8752 SYS BP LESS 140: HCPCS | Performed by: NURSE PRACTITIONER

## 2022-12-19 RX ORDER — HYDROCHLOROTHIAZIDE 25 MG/1
25 TABLET ORAL DAILY
COMMUNITY
End: 2022-12-19

## 2022-12-19 RX ORDER — FUROSEMIDE 20 MG/1
20 TABLET ORAL 2 TIMES DAILY
Qty: 60 TABLET | Refills: 1 | Status: ON HOLD | OUTPATIENT
Start: 2022-12-19

## 2022-12-19 NOTE — PROGRESS NOTES
Care Transitions Initial Call    Call within 2 business days of discharge: Yes     Patient: Tanner Morrell Patient : 1950 MRN: [id number]    Last Discharge [address]       Date Complaint Diagnosis Description Type Department Provider    22 Shortness of Breath Severe sepsis ([facility] [address] . .. ED to Hosp-Admission (Discharged) (ADMIT) Sharath Ag MD; Sandip Rendon. .. Was this an external facility discharge? No     Challenges to be reviewed by the provider   Additional needs identified to be addressed with provider: yes    AHRF HFrEF, AS. Cardiology consulted. New medication:  corlanor 5 mg- 1/2 tablet BID. CTN working with cardiology to secure PA done. Will be able to  on 22. Resume Furosemide:  40 mg daily. Continue:  Farxiga 10 mg daily; plavix 75 mg daily. ASA 81 mg daily. Discontinue: Entresto 24-26 mg. TANNER on CKD- nephrology consulted. Hyperkalemia, POA- 6.0 down to 4.8 on 22. Continue:  Januvia 50 mg daily; glipizide 5 mg BID. Stop:  metformin 1000 mg and Jardiance. Method of communication with provider : staff message to cardiology, chart routing to PCP and cardiology    COVID-19 related testing was not done at this time. Note- Respiratory panel done - negative. Advance Care Planning:   Does patient have an Advance Directive: not on file. Inpatient Readmission Risk score: Unplanned Readmit Risk Score: 21.4    Was this a readmission?  yes   Patient stated reason for the admission: increased SOB    Patients top risk factors for readmission: lack of knowledge about disease, medical condition- , support system, utilization of services, and to be further assessed    Interventions to address risk factors: Communication with home health agencies or other community services the patient is currently using-[facility], Communication with specialists who will assume or re-assume care of the patient's system-specific problems-cardiology, Education of patient/family/caregiver/guardian to support self-management- , Assessment and support for treatment adherence and medication management- , and assisted with scheduling Lung PET scan, and communication with PCP    Care Transition Nurse (CTN) contacted the  spouse, Yazmin Quevedo  by telephone to perform post hospital discharge assessment. Verified name and  with family as identifiers. Provided introduction to self, and explanation of the CTN role. CTN reviewed discharge instructions, medical action plan and red flags with family who verbalized understanding. Were discharge instructions available to patient? yes. Reviewed appropriate site of care based on symptoms and resources available to patient including: Specialist, After hours contact number- , [facility] [address], [facility], and CTN and [facility] . Family given an opportunity to ask questions and does not have any further questions or concerns at this time. The family agrees to contact the PCP and/or speciality office for questions related to their healthcare. Partial medication reconciliation was performed with family, who verbalizes understanding of administration of home medications. Advised obtaining a 90-day supply of all daily and as-needed medications. Referral to Pharm D needed: to be further assessed     Home Health/Outpatient orders at discharge: PT and [address]: [facility]- which contracted out to [facility] for therapy   Date of initial visit: to be scheduled    Durable Medical Equipment ordered at discharge: None    Was patient discharged with a pulse oximeter? To be assessed    Discussed follow-up appointments. If no appointment was previously scheduled, appointment scheduling offered:  not needed . Is follow up appointment scheduled within 7 days of discharge? yes. [facility] follow up appointment(s):   Note- he saw Lanie Buerger, NP on  at 8:20.       Future Appointments   Date Time Provider Mina Sandy   1/4/2023  9:30 AM [facility] RCR PET DOSE [address] EK[place]INA   1/4/2023 10:00 AM [facility] RCR PET [address] EKATERINA   1/4/2023 11:00 AM Anne Mccormick NP CAVREY BS AMB   2/1/2023  3:00 PM MARY ANN CACERES BS AMB   2/1/2023  3:40 PM Monik Ambrose MD CAVREY BS AMB   2/14/2023  8:40 AM MD HI Ceballos BS AMB   3/15/2023  1:00 PM VASCULAR, MARY ANN REAGAN BS AMB   3/15/2023  1:40 PM MD TEZ Crocker BS AMB     Non-[facility] follow up appointment(s):   Pulmonary- Dr. Agatha Lefort- wife cancelled 12/19- now scheduled for 1/5/23. See PET scan above, prior to appointment. Plan for follow-up call in 1-2 days based on severity of symptoms and risk factors. Plan for next call:   Assess current symptoms including daily monitoring items. Results of taking extra dose of furosemide 40 mg  HH services scheduled, in place. Possible addition of SN. Progress with eating low NA, maintaining hydration. Assess if AMD in place, if so, secure copy for EMR. If not, assist with completing. CTN provided contact information for future needs. Goals Addressed                   This Visit's Progress       Myocardial Infarction     Patient verbalizes understanding of self -management goals after a Myocardial Infaraction. 12/20/22-  spoke with pharmacy and later Ms. Kendall- needs PA to be completed. Spoke with cardiology checking on samples(they do not have) and have no voucher. Checked with [facility] office, to see if they have voucher for free month trial-they do not have vouchers. Checked with [employer]- they state there was a PA submitted through [employer] Meds this morning at 7 am.  Takes 24-48 hours for decision, pended to pharmacist.   Refer #  [id number]  call [phone] to check on status. CTN spoke with Ms. Kendall, she said he has not been feeling well, she understands that the corlanor is for helping with heart rate- I will update her when PA is done, spoke with pharmacist, the PA must have been approved, she was able to run RX with no co-pay costs. She checked, it is not in stock, she will need to order, she remembers ordering, but today it is not there. She will check with [facility] to see if they have it in stock. If not, hers will come in tomorrow. She was able to locate it at [address] wife agreed for pharmacist to transfer RX to that location, wife will  today. Cardiology follow up is on same day as PET scan- cardiology agreed for wife to get there as soon as PET is completed, close by. [facility]     12/19/22- spoke with Ms. Kendall, she confirms seeing cardiology NP earlier this morning. She said she got phone call from [email or web address], CTN spoke with pharmacist, she said she has spoken with cardiology office's nurse, advised siena needs a PA done. She ordered the med, has in stock now, needs PA to fill through insurance. CTN sent message to cardiology provider. CTN relayed that [place] and Metformin discontinued, she cancelled auto fills, for now until she can talk with Ms. Kendall to clarify what he is and is not taking. Note:  Ms. Gerson Andres states that is hard for her to keep up with all the medication changes that are being made. CTN asked her to consider a home health nurse coming out to assess. She also said he has tried to use more pillows, but still has a hard time breathing especially when he tries to lie down. She confirms that [place], NP with cardiology ordered extra dose of furosemide for today and decreased future-continued doses. [facility]     Ms. Kendall asked for CTN's help with scheduling PET scan- CTN spoke with Central scheduling, then PET and back with central scheduling, they could not see an active order, but now can see and schedule, asked for scan to be done before seeing pulmonary on 1/5- Ms. Kendall had to cancel previously scheduled for 12/19, she had to be in [place] this afternoon (PET scan was not done, he readmitted to hospital). Last scan with contrast was on 12/5/22. Scheduled for:  1/4- arrive at 9:15. CTN sent [employer] message updating on above including prep for PET. [facility]    12/12/22- CTN continuing to work to secure order and appointment for PET scan needed. Left VM at [address] [place] [address], central scheduling does not have order for PET scan, CTN received VM last Friday, Pulmonary office asking for fax and/or phone number to send in order to PET scan. Spoke with office, message taken, asked Staci Pittman to call in order to [phone] and ICD 10 code needed is:  R91.8 bilateral pulmonary nodules. Saw that he was back in ED- being admitted, spoke with Jasmine Jackson, [address] in ED, updated her. CTN reached out to cardiology- consulted to update as well. [facility]     12/9/22- spoke with his wife, Yoseph Restrepo. Reviewed BP value parameters:  >100/50's. If SBP 90's, he is without symptoms of lightheaded, dizziness, etc, can monitor. If SBP 80's and/or symptoms call cardiology for guidance. Pharmacy had to order [place]. Advised him to take imdur 30 mg today if she does not have Entresto to administer. Monitor daily weight, reviewed good routine for weighing, reason for weighing, he is no off HCTZ and lasix. Teaching done on plavix. CTN clarifying with interventional provider if he placed stent in right femoral vs PTA only and determine follow up needed. Goal: to return to ADL's without symptoms- related to breathing and  able to increase activity level. Updated wife on OV with pulmonary, PET to be scheduled. [facility]          Post Hospitalization     Supportive resources in place to maintain patient in the community (ie. Home Health, DME equipment, refer to, medication assistant plan, etc.)        [facility]20/22- [facility], they had not received order for add skilled nursing, Verbal order taken. Will update PCP. [facility]     12/19/22-  CTN spoke with [facility] care office- [facility], they are a new company and have contracted out his PT and OT services with:  [facility]- their phone number is [phone]- they will be contacting Ms. Kendall to schedule SOC. Ms. Scar Vaughn feels he should do [facility] first, get stronger and then can start CR if able. CTN asked Ms. Kendall to consider adding SN to [facility] orders for education, support and assessment. CTN checked, [facility] fax number is:  [phone]. CTN will ask PCP office to fax in order for SN.      [facility]

## 2022-12-19 NOTE — PROGRESS NOTES
Nathan Castillo Popat     1950        Date of Visit-12/19/2022    PCP is Cherry Adams MD   Cardiologist:  Sukhdev Leon. Teagan Nash MD, [address] [facility] [place]  [facility]  HPI:  Sim John is a 71 y.o. male     CAD with CABG 6/9/2018. NSTEMI in November 2016 and resolved pulmonary HTN. Stent to circumflex an LAD in 2016. Stress echo in 2018 with a drop in BP with exercise and a drop in EF. Cath on 6/22/18 that showed even with a 5F catheter, he dampened with suspected ostial 3 vessel disease. Echo 3/27/2021 = EF 55 to 60% mild AR MR TR LAE MAC  Nuclear 3/18/2021 EF 64% medium size defect apical and inferior lateral reversible ischemia medium defect mid and inferolateral nonreversible appear to be infarction intermediate risk stress test .  PCI 02/28/2022--patent LIMA to LAD, vein graft to distal RCA with severe disease in the native vessels Occluded vein graft to OM 2. Native OM 2 severely diseased along with proximal to mid circumflex as well as distal left main. Overall the vessel is significantly remodeled negatively. Additionally there is significant disease in the first marginal branch which is even smaller as well as AV groove branch right at the takeoff of OM2. Single stent 2.25 x 28 mm Xience was placed extending from the OM 2 into the circumflex into the distal left main and sequentially dilated with 2.25 noncompliant, 2 5 noncompliant, 3 oh noncompliant and 3 5 noncompliant to perform multilevel POT to manage multiple bifurcations on the way. Atherectomy was considered but given small size of the vessels, was not deemed to be absolutely safe for the obtuse marginal lesion. Overall stent expanded fairly well related to the size of the vessels.  Normal LVEDP  Nuclear stress October 27, 2022 test showed ischemia similar to 3/15/2021 with a medium size defect in the mid to distal inferolateral and anterolateral segments in the circumflex territory he had septal dyskinesia with an EF of 39%. He had a fixed apical infarct  Echocardiogram October 27, 2022 showed an EF of 45 to 50% TAPSE at 1.2 showing reduced RV function sclerosis of the aortic valve with stenosis that was very mild with a valve area of mean of 7 mmHg and HUY of 1.4 cm². The mitral was thickened with restricted mobility and mild MR. Today. .. Hospitalized at [facility] for SOB and CP, discharged to home 12/16/22  Fu CHF, CAD, PTA of CFA   Reviewed medications, he is still taking HCTZ and lasix 40mg daily, he has not started Entresto yet and did not  Corlanor  /58 without taking any medications this morning  He c/o significant dyspnea and orthopnea, not sleeping much due to shortness of breath  He is propping himself up on several pillows and still having trouble sleeping  No chest pain or palpitations  No dizziness or lightheadedness  No LE edema  He and his wife are asking about whether or not to proceed with PET scan for nodules and seeing Dr. Surinder Almonte  After chart review I advised them to follow up with Dr. Surinder Almonte today and proceed with PET scan as planned     He works at a gas station  No difference in fatigue when stopping coreg in the past     Assessment/Plan:     Patient Instructions   Please stop taking HCTZ/hydrochlorothiazide  Please begin taking corlanor/ivabradine 1/2 tab twice daily  Do not take Entresto for now     Today please take lasix/furosemide 40mg when you get home and then another lasix 40mg this afternoon  Tomorrow please take lasix/furosemide 20mg in the morning and 20mg in the afternoon    Please have labs drawn at the [facility] lab two days before your return appointment in January     Please follow up with Dr. Surinder Almonte today and discuss PET scan - lung nodules    Please discuss adrenal nodule with your PCP     Please weigh yourself wearing the same amount of clothing every day after you have voided.   Keep a written record of your daily weights and bring it to each appointment. If you notice that your weight has increased by 3 pounds or more in 1 day or 5 pounds or more in 3 days please call the office. Please limit your salt/sodium intake to less than 2500mg daily. Acute on chronic systolic heart failure/HFrEF- EF 25-30% by recent echo, NYHA Class III  -per cardiologist, rapid deterioration in LV function with overall WMA on recent echo appears not to be in the tteritorry of ischemia suggesting alternative mechanism for cardiomyopathy (stress CMP vs myocarditis). Basal segments appear to be hyperdynamic compared to apical segments which may point towards a possibility of stress cardiomyopathy. Notably apex is significantly hypokinetic but LIMA to LAD is patent on recent cardiac catheterization hence cardiomyopathy is unlikely to be ischemic driven  -hypotension is limiting GDMT for CHF, advised him to stop HCTZ  -advised him to begin corlanor 1/2 tab BID but hold off on starting Entresto for now  -will do ECG at visit after starting corlanor  -advised him to take lasix 40mg BID today and then reduce his dose to Lasix 20mg BID  -will check BMP prior to return appointment in 3 weeks  -continue farxiga  -discussed GDMT for CHF and the plan to repeat echo in 2-3 months to reassess EF  -will discuss OP cardiac rehab at next visit   -has follow up with Dr. Harding Prudent in February 2023    2. Acute on chronic renal insufficiency  -creatinine 1.5 at discharge, continue lasix as above, holding entresto, will check BMP prior to next visit      3  CAD native vessel with stable angina  Prior CABG in 2018, cath 9/8/2021 open LIMA, PCI to OM 2 all the way up to the left main 2/28/2022. PCI to OM2 back up LM on 2/28/2022  Angina resolved with OM stenting   Cath 12/6/2022 showed open LIMA and RCA graft some in-stent restenosis in the long graft from the left main to OM 2 but not severe and the OM1 was more severely diseased in the small vessel.   Interventional cardiology felt no reasonable vessel to intervene and recommended medical management. The OM disease is consistent with a lateral wall ischemia demonstrated on prior nuclear stress test.  Continue DAPT with ASA and Plavix, has mild anemia, Hgb 8.5 at discharge, will check CBC prior to return appt  Continue statin, hypotension prohibits beta blocker at this time  Has RX for NTG SL tabs PRN chest pain   Not having chest pain currently       4. Aortic stenosis - mild, mean gradient of 7 mm Hg, peak gradient of 13mmHg, HUY 1.4cm by echo 11/27/2022       5. Diabetes mellitus type 2: on insulin, started jardiance, further management per PCP     6  Hypertension: hx of, now hypotensive, see above for plan      7. PAD: S/P Right SFA PTCA. Continue DAPT and statin. Will need follow up with Dr Seth Crabtree in 2 -3 months with MARIANELA/Dopplers     8. Dyslipidemia   Statin intolerant-improved, now on rosuvastatin 20mg daily, LDL 41  -Interesting that he had such progression of coronary disease despite having a low LDL on a high potency statin. If his LDL was higher I would consider adding a PCSK9 inhibitor but I cannot really recommend that given his LDL of 41. At goal , denies excess muscle aches or new liver issues        Impression:   1. Coronary artery disease involving native coronary artery of native heart without angina pectoris    2. Chronic systolic congestive heart failure ([address]    3. Anemia, unspecified type    4. PAD (peripheral artery disease) ([address]    5. Nonrheumatic aortic valve stenosis    6. Stage 3a chronic kidney disease ([address]    7. Dyslipidemia       Cardiac History:       ECHO ADULT FOLLOW-UP OR LIMITED 12/14/2022   Interpretation Summary    Left Ventricle: Severely reduced left ventricular systolic function with a visually estimated EF of 25 - 30%. Not well visualized. Left ventricle size is normal. Normal wall thickness. Normal wall motion. Normal diastolic function. Mitral Valve: Thickened leaflet. Moderate annular calcification of the mitral valve. Mild regurgitation. Contrast used: Definity. Note: image quality is poor, and even with Definity contrast, EF is very difficult to estimate, and the reported figure is approximate at best. Consider alternative imaging modalities such as MUGA or cardiac MRI to more accurately assess. Signed by: Radha Power MD on 12/14/2022  4:46 PM      INVASIVE VASCULAR PROCEDURE 12/09/2022  Conclusion  Findings  1. Normal LVEDP  2. Severe native multivessel coronary artery disease  3. Patent LIMA to LAD and vein graft to distal RCA  4. Recurrent ISR in OM1 stent with now 60 to 70% restenosis  5. Recoil of left main and circumflex stent with now recurrent 40 to 50% stenosis. 6.  Progression of ostial left main disease now to about 60% stenosis  7. Progression of disease in jailed first marginal branch now with diffuse 90% stenosis  8. High-grade stenosis in the mid to distal right potential femoral artery treated with 6 x 40 mm impact drug-coated balloon angioplasty to reduce the stenosis to less than 40%    Access: Right femoral ultrasound-guided no issues  Contrast 60 cc    Recommendations  1. Continue guideline directed medical therapy for secondary prevention of coronary events  2. Diffuse nature of disease and relatively small size of his arteries make medical management is the best possible option for his coronary artery disease. 3.  Plavix based dual antiplatelet therapy  Addendum by: Ravin Harley MD on 12/9/2022 10:17 AM    Signed by: Jessica Baeza MD on 12/9/2022 10:11 AM    Nuclear stress October 27, 2022 test showed ischemia similar to 3/15/2021 with a medium size defect in the mid to distal inferolateral and anterolateral segments in the circumflex territory he had septal dyskinesia with an EF of 39%.   He had a fixed apical infarct  Echocardiogram October 27, 2022 showed an EF of 45 to 50% TAPSE at 1.2 showing reduced RV function sclerosis of the aortic valve with stenosis that was very mild with a valve area of mean of 7 mmHg and HUY of 1.4 cm². The mitral was thickened with restricted mobility and mild MR. Echo 1-15-18 EF 63% 63 %. Mild LAE, MAC, mild AS mean 8, HUY 1.8 mild TR  CABG x 3 7-9-18= LIMA to LAD, SVG-OM, SVG-dRCA   Echo 6-22-18 EF 55-60%, mod MAC, aortic sclerosis without stenosis , mild TR  LISA 6-18-18 4 minutes +moderate hypokinesis of the mid anteroseptal, entire inferior, apical septal, and apical wall(s). With exercise a mild reduction in LV function. PCI 11-11-16 JEREMY to mLAD 95% JEREMY to OM1 90%     Future Appointments   Date Time Provider Mina Delgado   1/4/2023 11:00 AM Viky Mccormick NP CAVREY [facility]   2/1/2023  3:00 PM LIDATMARY ANN DIOP [facility]   2/1/2023  3:40 PM MD TEZ Dumas [facility]   2/14/2023  8:40 AM Ry Barkley MD [facility]   3/15/2023  1:00 PM MARY ANN KEITH [facility]   3/15/2023  1:40 PM Fabio Damian MD [facility]        Patient Care Team:  Ry Barkley MD as PCP - General (Family Medicine)  Ry Barkley MD as PCP - [facility] Provider  Brittny Mccrary MD (Cardiovascular Disease Physician)  Jere Torres MD (Gastroenterology)  Eamon Conti MD (Cardiothoracic Surgery)  Fabio Damian MD (Cardiovascular Disease Physician)  Kel Nelson MD (Nephrology)  Linda Bran RN as Care Transitions Nurse  Meredith Gtz MD (Pulmonary Disease)    ROS-except as noted above. . A complete cardiac and respiratory are reviewed and negative except as above ; Resp-denies wheezing  or productive cough,.  Const- No unusual weight loss or fever; Neuro-no recent seizure or CVA ; GI- No BRBPR, abdom pain, bloating ; - no  hematuria     Past Medical History:   Diagnosis Date    CAD (coronary artery disease) 11/10/2016    NSTEMI & 2 stents    Deafness 10/28/2012    DM (diabetes mellitus) [facility]     Elevated cholesterol     Hypertension     NSTEMI (non-ST elevated myocardial infarction) [facility] 11/10/2016      Social Hx= reports that he has never smoked. He has never been exposed to tobacco smoke. He has never used smokeless tobacco. He reports current alcohol use of about 2.0 standard drinks per week. He reports that he does not use drugs. Exam and Labs:  BP (!) 100/58 (BP 1 Location: Left upper arm, BP Patient Position: Sitting, BP Cuff Size: Adult) Comment: ate a piece of toast with tea, no meds this morning  Pulse 65   Resp 16   Ht 5' 9\" (1.753 m)   Wt 129 lb (58.5 kg)   BMI 19.05 kg/m² Constitutional:  NAD, comfortable  Head: NC,AT. Eyes: No scleral icterus. Neck:  Neck supple. No JVD present. Throat: moist mucous membranes. Chest: Effort normal & normal respiratory excursion . Neurological: alert, conversant and oriented . Skin: Skin is not cold. No obvious systemic rash noted. Not diaphoretic. No erythema. Psychiatric:  Grossly normal mood and affect. Behavior appears normal. Extremities:  no clubbing or cyanosis. Abdomen: slightly distended but compressible     Lungs: bibasilar crackles noted with some scattered light rhonchi   Heart: RRR, 2/6 ANDREA, normal S1, S2, no rubs, clicks or gallops , PMI non displaced.     Edema: Edema is none      Lab Results   Component Value Date/Time    Cholesterol, total 143 10/12/2022 09:10 AM    HDL Cholesterol 49 10/12/2022 09:10 AM    LDL, calculated 41.4 10/12/2022 09:10 AM    Triglyceride 263 (H) 10/12/2022 09:10 AM    CHOL/HDL Ratio 2.9 10/12/2022 09:10 AM     Lab Results   Component Value Date/Time    Sodium 134 (L) 12/16/2022 05:12 AM    Potassium 4.0 12/16/2022 05:12 AM    Chloride 104 12/16/2022 05:12 AM    CO2 22 12/16/2022 05:12 AM    Anion gap 8 12/16/2022 05:12 AM    Glucose 214 (H) 12/16/2022 05:12 AM    BUN 43 (H) 12/16/2022 05:12 AM    Creatinine 1.57 (H) 12/16/2022 05:12 AM    BUN/Creatinine ratio 27 (H) 12/16/2022 05:12 AM    GFR est AA 46 (L) 06/23/2022 09:40 AM    GFR est non-AA 38 (L) 06/23/2022 09:40 AM    Calcium 8.7 12/16/2022 05:12 AM      Wt Readings from Last 3 Encounters:   12/19/22 129 lb (58.5 kg)   12/16/22 126 lb 8.7 oz (57.4 kg)   12/05/22 130 lb (59 kg)      BP Readings from Last 3 Encounters:   12/19/22 (!) 100/58   12/16/22 (!) 106/42   12/08/22 (!) 100/43      Current Outpatient Medications   Medication Sig    furosemide (LASIX) 20 mg tablet Take 1 Tablet by mouth two (2) times a day. ivabradine (CORLANOR) 5 mg tablet Take 0.5 Tablets by mouth two (2) times daily (with meals) for 30 days. dapagliflozin (FARXIGA) 10 mg tab tablet Take 1 Tablet by mouth daily. glipiZIDE (GLUCOTROL) 5 mg tablet Take 1 tablet by mouth twice daily    tamsulosin (FLOMAX) 0.4 mg capsule Take 1 capsule by mouth once daily    ipratropium (ATROVENT) 21 mcg (0.03 %) nasal spray 2 Sprays by Both Nostrils route every twelve (12) hours. nitroglycerin (NITROSTAT) 0.4 mg SL tablet 1 Tablet by SubLINGual route every five (5) minutes as needed for Chest Pain. Up to 3 doses. ergocalciferol (ERGOCALCIFEROL) 1,250 mcg (50,000 unit) capsule Take 1 Capsule by mouth every seven (7) days. Januvia 50 mg tablet Take 1 tablet by mouth once daily    polyethylene glycol (MIRALAX) 17 gram/dose powder Take 17 g by mouth daily. clopidogreL (Plavix) 75 mg tab Take 1 Tablet by mouth daily for 360 days. Indications: a sudden worsening of angina called acute coronary syndrome    rosuvastatin (CRESTOR) 20 mg tablet Take 1 Tablet by mouth nightly. aspirin delayed-release 81 mg tablet Take 1 Tab by mouth. No current facility-administered medications for this visit.      PRESTON Avilez, [facility]   [address], [address] [facility] [address]  [address]  (Y) [phone] (EBA) [phone]

## 2022-12-19 NOTE — PATIENT INSTRUCTIONS
Please stop taking HCTZ/hydrochlorothiazide  Please begin taking corlanor/ivabradine 1/2 tab twice daily  Do not take Entresto for now     Today please take lasix/furosemide 40mg when you get home and then another lasix 40mg this afternoon  Tomorrow please take lasix/furosemide 20mg in the morning and 20mg in the afternoon    Please have labs drawn at the [facility] lab two days before your return appointment in January     Please follow up with Dr. Montrell Crowder today and discuss PET scan - lung nodules    Please discuss adrenal nodule with your PCP     Please weigh yourself wearing the same amount of clothing every day after you have voided. Keep a written record of your daily weights and bring it to each appointment. If you notice that your weight has increased by 3 pounds or more in 1 day or 5 pounds or more in 3 days please call the office. Please limit your salt/sodium intake to less than 2500mg daily.

## 2022-12-20 ENCOUNTER — TELEPHONE (OUTPATIENT)
Dept: CARDIOLOGY CLINIC | Age: 72
End: 2022-12-20

## 2022-12-20 ENCOUNTER — TRANSCRIBE ORDER (OUTPATIENT)
Dept: SCHEDULING | Age: 72
End: 2022-12-20

## 2022-12-20 DIAGNOSIS — R91.8 LUNG MASS: Primary | ICD-10-CM

## 2022-12-20 DIAGNOSIS — I50.22 CHRONIC SYSTOLIC CONGESTIVE HEART FAILURE (HCC): Primary | ICD-10-CM

## 2022-12-20 NOTE — TELEPHONE ENCOUNTER
PA completed this morning. Received  approval this afternoon. Intent [employer] - [id number].     Resent Rx per VO by NP.

## 2022-12-20 NOTE — TELEPHONE ENCOUNTER
Pt is waiting on medication to be filled, the medication was prescribed on the pt last hospital visit, and the pharmacy stated they could not fill it until his cardiologist sent over a rq for the medication. I asked the wife what the name of the medication is and she said she did not remember. Pt wife stated that Russ Roblero saw her  in the hospital yesterday and prescribed him with furosemide, but that is not the medication she if referring to. Pt wife would like a call back from either [address] [place] [address] or Query Hunter.           Pt wife   [phone]

## 2022-12-20 NOTE — TELEPHONE ENCOUNTER
Cori Mccartney called and asked if we had samples of corlanor and asked if I had started the PA, I let her know that we do not have any samples of corlanor and that his PA is on my list to do.   She stat edthat she will call [facility] and see if they have any co pay cards and she will complete the PA herself,

## 2022-12-21 ENCOUNTER — TELEPHONE (OUTPATIENT)
Dept: FAMILY MEDICINE CLINIC | Age: 72
End: 2022-12-21

## 2022-12-21 NOTE — TELEPHONE ENCOUNTER
Outbound call to patient. Spoke to pt's wife as per PHI. Name and  verified. Offered a hospital follow up appointment with another provider in office but wife Cory Shipman stated that she wants pt to be seen by Mary Jane Anthony even if it is a virtual it will be fine. They stated they will wait for Mary Jane Anthony.

## 2022-12-22 ENCOUNTER — HOSPITAL ENCOUNTER (INPATIENT)
Age: 72
LOS: 28 days | Discharge: HOME HEALTH | DRG: 871 | End: 2023-01-19
Attending: STUDENT IN AN ORGANIZED HEALTH CARE EDUCATION/TRAINING PROGRAM | Admitting: FAMILY MEDICINE
Payer: MEDICARE

## 2022-12-22 ENCOUNTER — APPOINTMENT (OUTPATIENT)
Dept: CT IMAGING | Age: 72
DRG: 871 | End: 2022-12-22
Attending: STUDENT IN AN ORGANIZED HEALTH CARE EDUCATION/TRAINING PROGRAM
Payer: MEDICARE

## 2022-12-22 ENCOUNTER — APPOINTMENT (OUTPATIENT)
Dept: GENERAL RADIOLOGY | Age: 72
DRG: 871 | End: 2022-12-22
Attending: STUDENT IN AN ORGANIZED HEALTH CARE EDUCATION/TRAINING PROGRAM
Payer: MEDICARE

## 2022-12-22 ENCOUNTER — TRANSCRIBE ORDER (OUTPATIENT)
Dept: SCHEDULING | Age: 72
End: 2022-12-22

## 2022-12-22 DIAGNOSIS — I25.10 CORONARY ARTERY DISEASE INVOLVING NATIVE CORONARY ARTERY OF NATIVE HEART WITHOUT ANGINA PECTORIS: ICD-10-CM

## 2022-12-22 DIAGNOSIS — J81.0 ACUTE PULMONARY EDEMA (HCC): ICD-10-CM

## 2022-12-22 DIAGNOSIS — E11.65 TYPE 2 DIABETES MELLITUS WITH HYPERGLYCEMIA, UNSPECIFIED WHETHER LONG TERM INSULIN USE (HCC): ICD-10-CM

## 2022-12-22 DIAGNOSIS — R09.02 HYPOXIA: Primary | ICD-10-CM

## 2022-12-22 DIAGNOSIS — I50.9 CONGESTIVE HEART FAILURE, UNSPECIFIED HF CHRONICITY, UNSPECIFIED HEART FAILURE TYPE (HCC): ICD-10-CM

## 2022-12-22 DIAGNOSIS — E11.22 CKD STAGE 3 DUE TO TYPE 2 DIABETES MELLITUS (HCC): ICD-10-CM

## 2022-12-22 DIAGNOSIS — I50.23 SYSTOLIC CHF, ACUTE ON CHRONIC (HCC): ICD-10-CM

## 2022-12-22 DIAGNOSIS — R57.0 CARDIOGENIC SHOCK (HCC): ICD-10-CM

## 2022-12-22 DIAGNOSIS — I42.9 CARDIOMYOPATHY (HCC): ICD-10-CM

## 2022-12-22 DIAGNOSIS — I42.0 DILATED CARDIOMYOPATHY (HCC): ICD-10-CM

## 2022-12-22 DIAGNOSIS — R06.09 DOE (DYSPNEA ON EXERTION): ICD-10-CM

## 2022-12-22 DIAGNOSIS — J18.9 PULMONARY INFECTION: ICD-10-CM

## 2022-12-22 DIAGNOSIS — Z95.1 S/P CABG X 3: ICD-10-CM

## 2022-12-22 DIAGNOSIS — Z71.89 GOALS OF CARE, COUNSELING/DISCUSSION: ICD-10-CM

## 2022-12-22 DIAGNOSIS — Z71.1 CONCERN ABOUT END OF LIFE: ICD-10-CM

## 2022-12-22 DIAGNOSIS — I42.9 CARDIOMYOPATHY, UNSPECIFIED TYPE (HCC): ICD-10-CM

## 2022-12-22 DIAGNOSIS — Z51.5 PALLIATIVE CARE ENCOUNTER: ICD-10-CM

## 2022-12-22 DIAGNOSIS — R91.8 LUNG MASS: Primary | ICD-10-CM

## 2022-12-22 DIAGNOSIS — Z01.818 PREOPERATIVE EVALUATION OF A MEDICAL CONDITION TO RULE OUT SURGICAL CONTRAINDICATIONS (TAR REQUIRED): ICD-10-CM

## 2022-12-22 DIAGNOSIS — E11.65 TYPE 2 DIABETES MELLITUS WITH HYPERGLYCEMIA, WITHOUT LONG-TERM CURRENT USE OF INSULIN (HCC): ICD-10-CM

## 2022-12-22 DIAGNOSIS — Z79.899 RECEIVING INOTROPIC MEDICATION: ICD-10-CM

## 2022-12-22 DIAGNOSIS — E87.20 LACTIC ACIDOSIS: ICD-10-CM

## 2022-12-22 DIAGNOSIS — N17.9 AKI (ACUTE KIDNEY INJURY) (HCC): ICD-10-CM

## 2022-12-22 DIAGNOSIS — N32.0 BLADDER OUTLET OBSTRUCTION: ICD-10-CM

## 2022-12-22 DIAGNOSIS — Z71.89 DNR (DO NOT RESUSCITATE) DISCUSSION: ICD-10-CM

## 2022-12-22 DIAGNOSIS — Z63.6 CAREGIVER BURDEN: ICD-10-CM

## 2022-12-22 DIAGNOSIS — N18.30 CKD STAGE 3 DUE TO TYPE 2 DIABETES MELLITUS (HCC): ICD-10-CM

## 2022-12-22 PROBLEM — A41.9 SEPSIS (HCC): Status: ACTIVE | Noted: 2022-01-01

## 2022-12-22 LAB
ALBUMIN SERPL-MCNC: 3.4 G/DL (ref 3.5–5)
ALBUMIN/GLOB SERPL: 0.8 (ref 1.1–2.2)
ALP SERPL-CCNC: 105 U/L (ref 45–117)
ALT SERPL-CCNC: 19 U/L (ref 12–78)
ANION GAP SERPL CALC-SCNC: 15 MMOL/L (ref 5–15)
APPEARANCE UR: CLEAR
ARTERIAL PATENCY WRIST A: POSITIVE
AST SERPL-CCNC: 11 U/L (ref 15–37)
ATRIAL RATE: 119 BPM
B PERT DNA SPEC QL NAA+PROBE: NOT DETECTED
BACTERIA URNS QL MICRO: NEGATIVE /HPF
BASE DEFICIT BLDV-SCNC: 11.1 MMOL/L
BASOPHILS # BLD: 0 K/UL (ref 0–0.1)
BASOPHILS NFR BLD: 0 % (ref 0–1)
BDY SITE: ABNORMAL
BILIRUB SERPL-MCNC: 0.8 MG/DL (ref 0.2–1)
BILIRUB UR QL: NEGATIVE
BNP SERPL-MCNC: 6905 PG/ML
BORDETELLA PARAPERTUSSIS PCR, BORPAR: NOT DETECTED
BUN SERPL-MCNC: 62 MG/DL (ref 6–20)
BUN/CREAT SERPL: 24 (ref 12–20)
C PNEUM DNA SPEC QL NAA+PROBE: NOT DETECTED
CALCIUM SERPL-MCNC: 9.5 MG/DL (ref 8.5–10.1)
CALCULATED P AXIS, ECG09: 76 DEGREES
CALCULATED R AXIS, ECG10: 101 DEGREES
CALCULATED T AXIS, ECG11: -135 DEGREES
CHLORIDE SERPL-SCNC: 97 MMOL/L (ref 97–108)
CO2 SERPL-SCNC: 18 MMOL/L (ref 21–32)
COLOR UR: ABNORMAL
COMMENT, HOLDF: NORMAL
CREAT SERPL-MCNC: 2.63 MG/DL (ref 0.7–1.3)
DIAGNOSIS, 93000: NORMAL
DIFFERENTIAL METHOD BLD: ABNORMAL
EOSINOPHIL # BLD: 0 K/UL (ref 0–0.4)
EOSINOPHIL NFR BLD: 0 % (ref 0–7)
EPITH CASTS URNS QL MICRO: ABNORMAL /LPF
ERYTHROCYTE [DISTWIDTH] IN BLOOD BY AUTOMATED COUNT: 18.6 % (ref 11.5–14.5)
FLUAV SUBTYP SPEC NAA+PROBE: NOT DETECTED
FLUBV RNA SPEC QL NAA+PROBE: NOT DETECTED
GAS FLOW.O2 O2 DELIVERY SYS: ABNORMAL
GLOBULIN SER CALC-MCNC: 4.5 G/DL (ref 2–4)
GLUCOSE SERPL-MCNC: 458 MG/DL (ref 65–100)
GLUCOSE UR STRIP.AUTO-MCNC: >1000 MG/DL
HADV DNA SPEC QL NAA+PROBE: NOT DETECTED
HCO3 BLDV-SCNC: 15.5 MMOL/L (ref 23–28)
HCOV 229E RNA SPEC QL NAA+PROBE: NOT DETECTED
HCOV HKU1 RNA SPEC QL NAA+PROBE: NOT DETECTED
HCOV NL63 RNA SPEC QL NAA+PROBE: NOT DETECTED
HCOV OC43 RNA SPEC QL NAA+PROBE: NOT DETECTED
HCT VFR BLD AUTO: 33.1 % (ref 36.6–50.3)
HGB BLD-MCNC: 9.8 G/DL (ref 12.1–17)
HGB UR QL STRIP: NEGATIVE
HMPV RNA SPEC QL NAA+PROBE: NOT DETECTED
HPIV1 RNA SPEC QL NAA+PROBE: NOT DETECTED
HPIV2 RNA SPEC QL NAA+PROBE: NOT DETECTED
HPIV3 RNA SPEC QL NAA+PROBE: NOT DETECTED
HPIV4 RNA SPEC QL NAA+PROBE: NOT DETECTED
IMM GRANULOCYTES # BLD AUTO: 0.3 K/UL (ref 0–0.04)
IMM GRANULOCYTES NFR BLD AUTO: 1 % (ref 0–0.5)
INSPIRATION.DURATION SETTING TIME VENT: 16 SEC
KETONES UR QL STRIP.AUTO: NEGATIVE MG/DL
LACTATE SERPL-SCNC: 2.3 MMOL/L (ref 0.4–2)
LACTATE SERPL-SCNC: 2.4 MMOL/L (ref 0.4–2)
LACTATE SERPL-SCNC: 5.2 MMOL/L (ref 0.4–2)
LACTATE SERPL-SCNC: 6 MMOL/L (ref 0.4–2)
LEUKOCYTE ESTERASE UR QL STRIP.AUTO: NEGATIVE
LYMPHOCYTES # BLD: 0.3 K/UL (ref 0.8–3.5)
LYMPHOCYTES NFR BLD: 1 % (ref 12–49)
M PNEUMO DNA SPEC QL NAA+PROBE: NOT DETECTED
MCH RBC QN AUTO: 22.9 PG (ref 26–34)
MCHC RBC AUTO-ENTMCNC: 29.6 G/DL (ref 30–36.5)
MCV RBC AUTO: 77.3 FL (ref 80–99)
MONOCYTES # BLD: 1 K/UL (ref 0–1)
MONOCYTES NFR BLD: 4 % (ref 5–13)
NEUTS SEG # BLD: 23.5 K/UL (ref 1.8–8)
NEUTS SEG NFR BLD: 94 % (ref 32–75)
NITRITE UR QL STRIP.AUTO: NEGATIVE
NRBC # BLD: 0 K/UL (ref 0–0.01)
NRBC BLD-RTO: 0 PER 100 WBC
O2/TOTAL GAS SETTING VFR VENT: 100 %
P-R INTERVAL, ECG05: 134 MS
PCO2 BLDV: 36.7 MMHG (ref 41–51)
PEEP RESPIRATORY: 10 CMH2O
PH BLDV: 7.24 (ref 7.32–7.42)
PH UR STRIP: 5 (ref 5–8)
PLATELET # BLD AUTO: 426 K/UL (ref 150–400)
PMV BLD AUTO: 11.1 FL (ref 8.9–12.9)
PO2 BLDV: 25 MMHG (ref 25–40)
POTASSIUM SERPL-SCNC: 4.7 MMOL/L (ref 3.5–5.1)
PROCALCITONIN SERPL-MCNC: 0.11 NG/ML
PROCALCITONIN SERPL-MCNC: 0.32 NG/ML
PROT SERPL-MCNC: 7.9 G/DL (ref 6.4–8.2)
PROT UR STRIP-MCNC: ABNORMAL MG/DL
Q-T INTERVAL, ECG07: 322 MS
QRS DURATION, ECG06: 76 MS
QTC CALCULATION (BEZET), ECG08: 452 MS
RBC # BLD AUTO: 4.28 M/UL (ref 4.1–5.7)
RBC #/AREA URNS HPF: ABNORMAL /HPF (ref 0–5)
RBC MORPH BLD: ABNORMAL
RSV RNA SPEC QL NAA+PROBE: NOT DETECTED
RV+EV RNA SPEC QL NAA+PROBE: NOT DETECTED
SAMPLES BEING HELD,HOLD: NORMAL
SAO2 % BLDV: 35.5 % (ref 65–88)
SARS-COV-2 PCR, COVPCR: NOT DETECTED
SERVICE CMNT-IMP: ABNORMAL
SODIUM SERPL-SCNC: 130 MMOL/L (ref 136–145)
SP GR UR REFRACTOMETRY: 1.02 (ref 1–1.03)
SPECIMEN TYPE: ABNORMAL
TROPONIN-HIGH SENSITIVITY: 1096 NG/L (ref 0–76)
UROBILINOGEN UR QL STRIP.AUTO: 0.2 EU/DL (ref 0.2–1)
VENTRICULAR RATE, ECG03: 119 BPM
WBC # BLD AUTO: 25.1 K/UL (ref 4.1–11.1)
WBC URNS QL MICRO: ABNORMAL /HPF (ref 0–4)

## 2022-12-22 PROCEDURE — 84145 PROCALCITONIN (PCT): CPT

## 2022-12-22 PROCEDURE — 85025 COMPLETE CBC W/AUTO DIFF WBC: CPT

## 2022-12-22 PROCEDURE — 81001 URINALYSIS AUTO W/SCOPE: CPT

## 2022-12-22 PROCEDURE — 96375 TX/PRO/DX INJ NEW DRUG ADDON: CPT

## 2022-12-22 PROCEDURE — 65620000000 HC RM CCU GENERAL

## 2022-12-22 PROCEDURE — 74018 RADEX ABDOMEN 1 VIEW: CPT

## 2022-12-22 PROCEDURE — 83605 ASSAY OF LACTIC ACID: CPT

## 2022-12-22 PROCEDURE — 82803 BLOOD GASES ANY COMBINATION: CPT

## 2022-12-22 PROCEDURE — 74176 CT ABD & PELVIS W/O CONTRAST: CPT

## 2022-12-22 PROCEDURE — 74011250636 HC RX REV CODE- 250/636: Performed by: STUDENT IN AN ORGANIZED HEALTH CARE EDUCATION/TRAINING PROGRAM

## 2022-12-22 PROCEDURE — 74011000250 HC RX REV CODE- 250: Performed by: STUDENT IN AN ORGANIZED HEALTH CARE EDUCATION/TRAINING PROGRAM

## 2022-12-22 PROCEDURE — 74011250637 HC RX REV CODE- 250/637: Performed by: FAMILY MEDICINE

## 2022-12-22 PROCEDURE — 96365 THER/PROPH/DIAG IV INF INIT: CPT

## 2022-12-22 PROCEDURE — 74011250636 HC RX REV CODE- 250/636: Performed by: FAMILY MEDICINE

## 2022-12-22 PROCEDURE — 74011000258 HC RX REV CODE- 258: Performed by: STUDENT IN AN ORGANIZED HEALTH CARE EDUCATION/TRAINING PROGRAM

## 2022-12-22 PROCEDURE — 99285 EMERGENCY DEPT VISIT HI MDM: CPT

## 2022-12-22 PROCEDURE — 87086 URINE CULTURE/COLONY COUNT: CPT

## 2022-12-22 PROCEDURE — 74011000250 HC RX REV CODE- 250: Performed by: FAMILY MEDICINE

## 2022-12-22 PROCEDURE — 84484 ASSAY OF TROPONIN QUANT: CPT

## 2022-12-22 PROCEDURE — 71046 X-RAY EXAM CHEST 2 VIEWS: CPT

## 2022-12-22 PROCEDURE — 87040 BLOOD CULTURE FOR BACTERIA: CPT

## 2022-12-22 PROCEDURE — 0202U NFCT DS 22 TRGT SARS-COV-2: CPT

## 2022-12-22 PROCEDURE — 83880 ASSAY OF NATRIURETIC PEPTIDE: CPT

## 2022-12-22 PROCEDURE — 93005 ELECTROCARDIOGRAM TRACING: CPT

## 2022-12-22 PROCEDURE — 36415 COLL VENOUS BLD VENIPUNCTURE: CPT

## 2022-12-22 PROCEDURE — 94660 CPAP INITIATION&MGMT: CPT

## 2022-12-22 PROCEDURE — 74011250637 HC RX REV CODE- 250/637: Performed by: INTERNAL MEDICINE

## 2022-12-22 PROCEDURE — 80053 COMPREHEN METABOLIC PANEL: CPT

## 2022-12-22 PROCEDURE — 83735 ASSAY OF MAGNESIUM: CPT

## 2022-12-22 RX ORDER — ASPIRIN 81 MG/1
81 TABLET ORAL DAILY
Status: DISCONTINUED | OUTPATIENT
Start: 2022-12-23 | End: 2023-01-19 | Stop reason: HOSPADM

## 2022-12-22 RX ORDER — SODIUM CHLORIDE 0.9 % (FLUSH) 0.9 %
5-40 SYRINGE (ML) INJECTION AS NEEDED
Status: DISCONTINUED | OUTPATIENT
Start: 2022-12-22 | End: 2023-01-19 | Stop reason: HOSPADM

## 2022-12-22 RX ORDER — SODIUM CHLORIDE 0.9 % (FLUSH) 0.9 %
5-40 SYRINGE (ML) INJECTION EVERY 8 HOURS
Status: DISCONTINUED | OUTPATIENT
Start: 2022-12-22 | End: 2022-12-28

## 2022-12-22 RX ORDER — ACETAMINOPHEN 325 MG/1
650 TABLET ORAL
Status: DISCONTINUED | OUTPATIENT
Start: 2022-12-22 | End: 2022-12-22 | Stop reason: SDUPTHER

## 2022-12-22 RX ORDER — FUROSEMIDE 20 MG/1
20 TABLET ORAL 2 TIMES DAILY
Status: DISCONTINUED | OUTPATIENT
Start: 2022-12-22 | End: 2022-12-22

## 2022-12-22 RX ORDER — ERGOCALCIFEROL 1.25 MG/1
50000 CAPSULE ORAL
Status: DISCONTINUED | OUTPATIENT
Start: 2022-12-23 | End: 2022-12-29

## 2022-12-22 RX ORDER — ONDANSETRON 4 MG/1
4 TABLET, ORALLY DISINTEGRATING ORAL
Status: DISCONTINUED | OUTPATIENT
Start: 2022-12-22 | End: 2023-01-19 | Stop reason: HOSPADM

## 2022-12-22 RX ORDER — CLOPIDOGREL BISULFATE 75 MG/1
75 TABLET ORAL DAILY
Status: DISCONTINUED | OUTPATIENT
Start: 2022-12-23 | End: 2022-12-29

## 2022-12-22 RX ORDER — ONDANSETRON 4 MG/1
4 TABLET, ORALLY DISINTEGRATING ORAL
Status: DISCONTINUED | OUTPATIENT
Start: 2022-12-22 | End: 2022-12-22 | Stop reason: SDUPTHER

## 2022-12-22 RX ORDER — ACETAMINOPHEN 650 MG/1
650 SUPPOSITORY RECTAL
Status: DISCONTINUED | OUTPATIENT
Start: 2022-12-22 | End: 2023-01-19 | Stop reason: HOSPADM

## 2022-12-22 RX ORDER — FUROSEMIDE 10 MG/ML
40 INJECTION INTRAMUSCULAR; INTRAVENOUS ONCE
Status: DISCONTINUED | OUTPATIENT
Start: 2022-12-22 | End: 2022-12-22

## 2022-12-22 RX ORDER — POLYETHYLENE GLYCOL 3350 17 G/17G
17 POWDER, FOR SOLUTION ORAL DAILY PRN
Status: DISCONTINUED | OUTPATIENT
Start: 2022-12-22 | End: 2022-12-22 | Stop reason: SDUPTHER

## 2022-12-22 RX ORDER — TAMSULOSIN HYDROCHLORIDE 0.4 MG/1
0.4 CAPSULE ORAL DAILY
Status: DISCONTINUED | OUTPATIENT
Start: 2022-12-23 | End: 2023-01-15

## 2022-12-22 RX ORDER — POLYETHYLENE GLYCOL 3350 17 G/17G
17 POWDER, FOR SOLUTION ORAL DAILY PRN
Status: DISCONTINUED | OUTPATIENT
Start: 2022-12-22 | End: 2023-01-19 | Stop reason: HOSPADM

## 2022-12-22 RX ORDER — BUMETANIDE 1 MG/1
1 TABLET ORAL ONCE
Status: COMPLETED | OUTPATIENT
Start: 2022-12-23 | End: 2022-12-22

## 2022-12-22 RX ORDER — METOLAZONE 5 MG/1
5 TABLET ORAL
Status: COMPLETED | OUTPATIENT
Start: 2022-12-22 | End: 2022-12-22

## 2022-12-22 RX ORDER — SODIUM CHLORIDE 0.9 % (FLUSH) 0.9 %
5-40 SYRINGE (ML) INJECTION EVERY 8 HOURS
Status: DISCONTINUED | OUTPATIENT
Start: 2022-12-22 | End: 2023-01-19 | Stop reason: HOSPADM

## 2022-12-22 RX ORDER — IPRATROPIUM BROMIDE 21 UG/1
2 SPRAY, METERED NASAL EVERY 12 HOURS
Status: DISCONTINUED | OUTPATIENT
Start: 2022-12-22 | End: 2023-01-19 | Stop reason: HOSPADM

## 2022-12-22 RX ORDER — FUROSEMIDE 10 MG/ML
60 INJECTION INTRAMUSCULAR; INTRAVENOUS ONCE
Status: COMPLETED | OUTPATIENT
Start: 2022-12-22 | End: 2022-12-22

## 2022-12-22 RX ORDER — ACETAMINOPHEN 325 MG/1
650 TABLET ORAL
Status: DISCONTINUED | OUTPATIENT
Start: 2022-12-22 | End: 2023-01-19 | Stop reason: HOSPADM

## 2022-12-22 RX ORDER — GLIPIZIDE 5 MG/1
5 TABLET ORAL 2 TIMES DAILY
Status: DISCONTINUED | OUTPATIENT
Start: 2022-12-22 | End: 2022-12-23

## 2022-12-22 RX ORDER — NOREPINEPHRINE BITARTRATE/D5W 8 MG/250ML
.5-2 PLASTIC BAG, INJECTION (ML) INTRAVENOUS
Status: DISPENSED | OUTPATIENT
Start: 2022-12-22 | End: 2022-12-23

## 2022-12-22 RX ORDER — ENOXAPARIN SODIUM 100 MG/ML
30 INJECTION SUBCUTANEOUS DAILY
Status: DISCONTINUED | OUTPATIENT
Start: 2022-12-23 | End: 2022-12-27

## 2022-12-22 RX ORDER — ONDANSETRON 2 MG/ML
4 INJECTION INTRAMUSCULAR; INTRAVENOUS
Status: DISCONTINUED | OUTPATIENT
Start: 2022-12-22 | End: 2023-01-19 | Stop reason: HOSPADM

## 2022-12-22 RX ORDER — ONDANSETRON 2 MG/ML
4 INJECTION INTRAMUSCULAR; INTRAVENOUS
Status: DISCONTINUED | OUTPATIENT
Start: 2022-12-22 | End: 2022-12-22 | Stop reason: SDUPTHER

## 2022-12-22 RX ORDER — FUROSEMIDE 10 MG/ML
20 INJECTION INTRAMUSCULAR; INTRAVENOUS ONCE
Status: COMPLETED | OUTPATIENT
Start: 2022-12-22 | End: 2022-12-22

## 2022-12-22 RX ORDER — HEPARIN SODIUM 5000 [USP'U]/ML
5000 INJECTION, SOLUTION INTRAVENOUS; SUBCUTANEOUS EVERY 12 HOURS
Status: DISCONTINUED | OUTPATIENT
Start: 2022-12-22 | End: 2022-12-23 | Stop reason: ALTCHOICE

## 2022-12-22 RX ORDER — ACETAMINOPHEN 650 MG/1
650 SUPPOSITORY RECTAL
Status: DISCONTINUED | OUTPATIENT
Start: 2022-12-22 | End: 2022-12-22 | Stop reason: SDUPTHER

## 2022-12-22 RX ORDER — ROSUVASTATIN CALCIUM 10 MG/1
20 TABLET, COATED ORAL
Status: DISCONTINUED | OUTPATIENT
Start: 2022-12-22 | End: 2022-12-23

## 2022-12-22 RX ADMIN — SODIUM CHLORIDE, PRESERVATIVE FREE 10 ML: 5 INJECTION INTRAVENOUS at 22:00

## 2022-12-22 RX ADMIN — DEXTROSE MONOHYDRATE 4 MCG/MIN: 50 INJECTION, SOLUTION INTRAVENOUS at 17:17

## 2022-12-22 RX ADMIN — SODIUM CHLORIDE, PRESERVATIVE FREE 10 ML: 5 INJECTION INTRAVENOUS at 21:59

## 2022-12-22 RX ADMIN — FUROSEMIDE 20 MG: 10 INJECTION, SOLUTION INTRAMUSCULAR; INTRAVENOUS at 15:07

## 2022-12-22 RX ADMIN — CEFTRIAXONE 2 G: 2 INJECTION, POWDER, FOR SOLUTION INTRAMUSCULAR; INTRAVENOUS at 15:07

## 2022-12-22 RX ADMIN — FUROSEMIDE 60 MG: 10 INJECTION, SOLUTION INTRAMUSCULAR; INTRAVENOUS at 17:16

## 2022-12-22 RX ADMIN — HEPARIN SODIUM 5000 UNITS: 5000 INJECTION INTRAVENOUS; SUBCUTANEOUS at 17:16

## 2022-12-22 RX ADMIN — SODIUM CHLORIDE, PRESERVATIVE FREE 10 ML: 5 INJECTION INTRAVENOUS at 17:18

## 2022-12-22 RX ADMIN — AZITHROMYCIN DIHYDRATE 500 MG: 500 INJECTION, POWDER, LYOPHILIZED, FOR SOLUTION INTRAVENOUS at 15:07

## 2022-12-22 RX ADMIN — METOLAZONE 5 MG: 5 TABLET ORAL at 23:13

## 2022-12-22 RX ADMIN — ROSUVASTATIN 20 MG: 10 TABLET, FILM COATED ORAL at 23:12

## 2022-12-22 RX ADMIN — IPRATROPIUM BROMIDE 2 SPRAY: 21 SPRAY, METERED NASAL at 21:27

## 2022-12-22 RX ADMIN — BUMETANIDE 1 MG: 1 TABLET ORAL at 23:12

## 2022-12-22 SDOH — SOCIAL STABILITY - SOCIAL INSECURITY: DEPENDENT RELATIVE NEEDING CARE AT HOME: Z63.6

## 2022-12-22 NOTE — PROGRESS NOTES
Pt to CCU and settled into unit. Pt on low dose Levophed. Pt tolerating BIPAP. Pt's vitals stable. Pt with complaints of constipation  And no bowel for 3 days. Pt's buttocks sensitive to touch.  Pt noted to have a smear of stool on pad upon arrival.

## 2022-12-22 NOTE — PROGRESS NOTES
+                                                                                                                                                                      Critical Care Documentation    Name: Mary Beth Hilliard  YOB: 1950  MRN: [id number]  Admission Date: 12/22/2022 11:23 AM    Date of service: 12/22/2022    Active Diagnoses:    Hospital Problems  Date Reviewed: 12/5/2022            Codes Class Noted POA    Sepsis [facility]) ICD-10-CM: A41.9  ICD-9-CM: 038.9, 995.91  12/22/2022 Unknown           Chief Complaint:  Hypoxia and low blood pressure    Clinical Presentation:  Mary Beth Hilliard is a 71 y.o. male who presents with progressively worsening shortness of breath for past several days. It has gotten worse to the point that he can only ambulate a few steps due to severe dyspnea and near syncope. Patient also noted abdominal distention which is increasing. Patient with known history of cardiomyopathy and recently admitted for CHF exacerbation a week ago. On presentation to the ER, chest x-ray shows diffuse airspace disease atypical infection versus edema. Also patient was noted to have elevated troponin and BNP. Patient was further evaluated by CT abdomen and pelvis which shows massively distended bladder with some bilateral hydronephrosis, subsequently Vasquez was placed. Patient also with significant leukocytosis as well as tachycardic and elevated lactic acid level and subsequently code sepsis was called. Patient was subsequently admitted for further evaluation management. Since the admission, patient with increasing work of breathing, becoming more hypoxic, initially needing 4 L of oxygen and subsequently to 100% nonrebreather and then to BiPAP. Also patient is more hypotensive. Rapid response was called.     Physical Exam:   General: Alert x oriented x 3, awake, no acute distress, resting in bed, pleasant male, appears to be stated age  HEENT: PEERL, EOMI, moist mucus membranes  Neck: Supple, no JVD, no meningeal signs  Chest: Bibasilar crackles, increased work of breathing, tachypneic  CVS: RRR, S1 S2 heard, no murmurs/rubs/gallops  Abd: Soft, non-tender, non-distended, +bowel sounds   Ext: No clubbing, no cyanosis, bilateral lower extremity pitting edema  Neuro/Psych: Pleasant mood and affect, CN 2-12 grossly intact, sensory grossly within normal limit, Strength 5/5 in all extremities, DTR 1+ x 4  Cap refill: Brisk, less than 3 seconds  Pulses: 2+, symmetric in all extremities  Skin: Warm, dry, without rashes or lesions    Data Reviewed: All diagnostic labs and studies have been reviewed. Assessment and Plan:  Acute hypoxic respiratory failure is getting worse and now needing BiPAP. Patient with increased work of breathing. Also patient becoming more hypotensive, concern for septic shock. Rapid response was called. Started on BiPAP. Continue current antibiotics. Intensivist consulted for further evaluation. Patient will need ICU level of care. Medications Administered:   Sedation: [ ] yes [ ] no   Anxiolytics: [ ] yes [ ] no   Antiarrhythmics: [ ] yes [ ] no   Antihypertensives: [ ] yes [ ] no   Pressors: [ ] yes [ ] no   IVF's: [ ] yes [ ] no       Critical Care Attestation: This patient is unstable and critically ill. Due to a high probability of clinically significant, life threatening deterioration, the patient required my highest level of preparedness to intervene emergently and I personally spent this critical care time directly and personally managing the patient. This critical care time included obtaining a history; examining the patient; pulse oximetry; ordering and review of studies; arranging urgent treatment with development of a management plan; evaluation of patient's response to treatment; frequent reassessment; and, discussions with other providers and/or family.  This critical care time was performed to assess and manage the high probability of imminent, life-threatening deterioration that could result in multi-organ failure and death. It was exclusive of separately billable procedures, treating other patients, and teaching time. Time Spent:     I personally spent 60 minutes in providing critical care time.     Paula Munoz MD  12/22/2022  4:43 PM

## 2022-12-22 NOTE — ROUTINE PROCESS
TRANSFER - OUT REPORT:    Verbal report given to Kyle(name) on Eloisa August  being transferred to CCU(unit) for routine progression of care       Report consisted of patients Situation, Background, Assessment and   Recommendations(SBAR). Information from the following report(s) SBAR, Kardex, ED Summary, and Recent Results was reviewed with the receiving nurse. Lines:   Peripheral IV 12/22/22 Left Antecubital (Active)        Opportunity for questions and clarification was provided.       Patient transported with:   Monitor  Registered Nurse  Tech  RT

## 2022-12-22 NOTE — ED PROVIDER NOTES
Harsha Cedeno is a 71 y.o. male with past medical history notable for CAD, diabetes, hypertension, NSTEMI presenting with gradually worsening dyspnea over the past week as well as chills and constipation for the past 2 days. He also endorses right lower quadrant pain which is worse with movement, cramping. He does have a history of appendectomy. He denies dysuria. .       The history is provided by the patient. Shortness of Breath  This is a recurrent problem. The problem occurs continuously. The current episode started less than 1 hour ago. Associated symptoms include abdominal pain. Pertinent negatives include no fever, no headaches, no sore throat, no ear pain, no cough, no PND, no chest pain, no vomiting and no rash. Associated medical issues include CAD, heart failure and past MI.       Past Medical History:   Diagnosis Date    CAD (coronary artery disease) 11/10/2016    NSTEMI & 2 stents    Deafness 10/28/2012    DM (diabetes mellitus) ([facility] [address]     Elevated cholesterol     Hypertension     NSTEMI (non-ST elevated myocardial infarction) ([facility] [address] 11/10/2016       Past Surgical History:   Procedure Laterality Date    COLONOSCOPY N/A 6/28/2018    COLONOSCOPY performed by Steffanie Mcclure MD at [facility] ENDOSCOPY    [address] UNLIST  11/11/2016    2 stents         Family History:   Problem Relation Age of Onset    Heart Disease Father     Heart Attack Father     Hypertension Mother     Elevated Lipids Brother     Elevated Lipids Brother     No Known Problems Sister     Elevated Lipids Brother     No Known Problems Son     No Known Problems Daughter     Anesth Problems Neg Hx        Social History     Socioeconomic History    Marital status:      Spouse name: Not on file    Number of children: Not on file    Years of education: Not on file    Highest education level: Not on file   Occupational History    Not on file   Tobacco Use    Smoking status: Never     Passive exposure: Never Smokeless tobacco: Never   Vaping Use    Vaping Use: Never used   Substance and Sexual Activity    Alcohol use: Yes     Alcohol/week: 2.0 standard drinks     Types: 1 Cans of beer, 1 Shots of liquor per week     Comment: rarely    Drug use: No    Sexual activity: Yes   Other Topics Concern    Not on file   Social History Narrative    Not on file     Social Determinants of Health     Financial Resource Strain: Medium Risk    Difficulty of Paying Living Expenses: Somewhat hard   Food Insecurity: Food Insecurity Present    Worried About Running Out of Food in the Last Year: Never true    Ran Out of Food in the Last Year: Often true   Transportation Needs: Not on file   Physical Activity: Not on file   Stress: Not on file   Social Connections: Not on file   Intimate Partner Violence: Not on file   Housing Stability: Not on file         ALLERGIES: Patient has no known allergies. Review of Systems   Constitutional:  Positive for chills and fatigue. Negative for fever. HENT:  Negative for ear pain, sore throat and trouble swallowing. Eyes:  Negative for visual disturbance. Respiratory:  Positive for shortness of breath. Negative for cough. Cardiovascular:  Negative for chest pain and PND. Gastrointestinal:  Positive for abdominal pain. Negative for nausea and vomiting. Genitourinary:  Negative for dysuria. Musculoskeletal:  Negative for back pain. Skin:  Negative for rash. Neurological:  Negative for light-headedness and headaches. Psychiatric/Behavioral:  Negative for confusion. All other systems reviewed and are negative. Vitals:    12/22/22 1108 12/22/22 1241 12/22/22 1430   BP: 114/68 124/66 93/61   Pulse: (!) 120 (!) 121 (!) 119   Resp: 28 29 24   Temp: 97.8 °F (36.6 °C)  98.8 °F (37.1 °C)   SpO2: 100%  92%            Physical Exam  Constitutional:       General: He is not in acute distress. Appearance: He is not toxic-appearing. HENT:      Head: Normocephalic and atraumatic. Mouth/Throat:      Mouth: Mucous membranes are moist.   Eyes:      Extraocular Movements: Extraocular movements intact. Neck:      Vascular: JVD present. Cardiovascular:      Rate and Rhythm: Normal rate and regular rhythm. Heart sounds: Normal heart sounds. Pulmonary:      Effort: Pulmonary effort is normal. No respiratory distress. Breath sounds: Rhonchi and rales present. Abdominal:      Palpations: Abdomen is soft. Tenderness: There is no abdominal tenderness. Comments: Abdomen is distended but only tender in the right lower abdomen. Musculoskeletal:      Cervical back: Normal range of motion. Right lower leg: Edema present. Left lower leg: Edema present. Comments: Trace pedal edema   Skin:     Capillary Refill: Capillary refill takes less than 2 seconds. Neurological:      General: No focal deficit present. Mental Status: He is alert and oriented to person, place, and time. Psychiatric:         Mood and Affect: Mood normal.        MDM     Amount and/or Complexity of Data Reviewed  Decide to obtain previous medical records or to obtain history from someone other than the patient: yes      ED Course as of 12/22/22 1449   Thu Dec 22, 2022   1139   ED EKG interpretation:  Rhythm: Sinus tachycardia rate 119 with rightward axis. ST segment: Nonspecific ST segment changes. Not significantly changed from EKG dated 6 days ago. This EKG was interpreted by Jewels Castle MD,ED Provider. [JM]      ED Course User Index  [JM] Michelle Rivera MD       Procedures  Perfect Serve Consult for Admission  2:47 PM    ED Room Number: PI34/15  Patient Name and age:  Mani Kendall 71 y.o.  male  Working Diagnosis:   1. Hypoxia    2. Acute pulmonary edema (HCC)    3. Pulmonary infection    4. TANNER (acute kidney injury) ([address]    5. Bladder outlet obstruction    6. DONALDSON (dyspnea on exertion)    7.  Lactic acidosis        COVID-19 Suspicion:  no  Sepsis present:  yes Reassessment needed: yes  Code Status:  Full Code  Readmission: no  Isolation Requirements:  no  Recommended Level of Care:  telemetry  Department:[facility] Adult ED - [phone]    MEDICAL DECISION MAKIN y.o. male presents with Shortness of Breath      28-year-old gentleman was recently admitted for CHF exacerbation with recurrent dyspnea on exertion in the past several days, with inability now to ambulate more than a few steps without severe dyspnea and near syncope. He also is complaining of constipation for the last 2 days and has had increasing abdominal distention. He had nonperitoneal abdomen. CT revealed distended bladder and was decompressed with a Vasquez catheter. This may relieve some of the issues with bowel transit. He appears to be hypervolemic. He has a new leukocytosis. The exact source unclear. May be a pulmonary infection. We will hold off on IV fluids given that he is hypervolemic. LABORATORY TESTS:  Labs Reviewed   CBC WITH AUTOMATED DIFF - Abnormal; Notable for the following components:       Result Value    WBC 25.1 (*)     HGB 9.8 (*)     HCT 33.1 (*)     MCV 77.3 (*)     MCH 22.9 (*)     MCHC 29.6 (*)     RDW 18.6 (*)     PLATELET 492 (*)     NEUTROPHILS 94 (*)     LYMPHOCYTES 1 (*)     MONOCYTES 4 (*)     IMMATURE GRANULOCYTES 1 (*)     ABS. NEUTROPHILS 23.5 (*)     ABS. LYMPHOCYTES 0.3 (*)     ABS. IMM.  GRANS. 0.3 (*)     All other components within normal limits   METABOLIC PANEL, COMPREHENSIVE - Abnormal; Notable for the following components:    Sodium 130 (*)     CO2 18 (*)     Glucose 458 (*)     BUN 62 (*)     Creatinine 2.63 (*)     BUN/Creatinine ratio 24 (*)     eGFR 25 (*)     AST (SGOT) 11 (*)     Albumin 3.4 (*)     Globulin 4.5 (*)     A-G Ratio 0.8 (*)     All other components within normal limits   NT-PRO BNP - Abnormal; Notable for the following components:    NT pro-BNP 6,905 (*)     All other components within normal limits   TROPONIN-HIGH SENSITIVITY - Abnormal; Notable for the following components:    Troponin-High Sensitivity 1,096 (*)     All other components within normal limits   LACTIC ACID - Abnormal; Notable for the following components:    Lactic acid 5.2 (*)     All other components within normal limits   URINALYSIS W/MICROSCOPIC - Abnormal; Notable for the following components:    Protein TRACE (*)     Glucose >1,000 (*)     All other components within normal limits   CULTURE, BLOOD, PAIRED   CULTURE, URINE   SAMPLES BEING HELD   PROCALCITONIN       IMAGING RESULTS:  CT ABD PELV WO CONT   Final Result      1. Bladder is massively distended with mild bilateral hydronephrosis. May   indicate bladder outlet obstruction. Prostate is mildly enlarged   2. Gallstones. No acute cholecystitis   3. Small bilateral pleural effusions      XR CHEST PA LAT   Final Result   Widespread interstitial opacities bilateral mid to lower lung opacities   concerning for edema and/or atypical infection. Small bilateral pleural   effusions. XR ABD (KUB)   Final Result      1. No evidence of ileus or obstruction. Moderate colorectal stool burden,   correlate for constipation and/or impaction. 2.   Suspected patchy bibasilar atelectasis/airspace disease and small bilateral   pleural effusions. 3.  Cholelithiasis. MEDICATIONS GIVEN:  Medications   furosemide (LASIX) injection 20 mg (has no administration in time range)   cefTRIAXone (ROCEPHIN) 2 g in 0.9% sodium chloride 20 mL IV syringe (has no administration in time range)   azithromycin (ZITHROMAX) 500 mg in 0.9% sodium chloride 250 mL ([id number]) (has no administration in time range)     IMPRESSION:  1. Hypoxia    2. Acute pulmonary edema (HCC)    3. Pulmonary infection    4. TANNER (acute kidney injury) ([address]    5. Bladder outlet obstruction    6. DONALDSON (dyspnea on exertion)    7.  Lactic acidosis        PLAN:  - Admit to hospitalist    France Walton MD      Please note that this dictation was completed with Dragon, the computer voice recognition software. Quite often unanticipated grammatical, syntax, homophones, and other interpretive errors are inadvertently transcribed by the computer software. Please disregard these errors. Please excuse any errors that have escaped final proofreading. Code Sepsis Reassessment & Plan    - Broad Spectrum Antibiotics given: Ceftriaxone and Azithromycin  - Repeat lactic acid ordered for time 4   - Re-assessment performed at time 2:48 PM  and clinical condition stable.     - Actions taken: start abx.  - Hypotension or Lactic Acidosis present (SBP<90, MAP<65, Lactate >4): YES   - IVF: Limited IVF given ( ) because patient has CHF classification NYHA III or IV  - Persistent Septic Shock present (Hypotension despite IVF resuscitation): NO  Vasopressors: Not indicated due to septic shock not present  - Disposition: Admit to Telemetry

## 2022-12-22 NOTE — H&P
[address] [facility] Adult  Hospitalist Group  History and Physical    Date of Service:  12/22/2022  Primary Care Provider: Beverley Beach MD  Source of information: The patient and Chart review    Chief Complaint: Shortness of Breath      History of Presenting Illness:   Pollo Muro is a 71 y.o. male who presents with progressively worsening shortness of breath for past several days. It has gotten worse to the point that he can only ambulate a few steps due to severe dyspnea and near syncope. Patient also noted abdominal distention which is increasing. Patient with known history of cardiomyopathy and recently admitted for CHF exacerbation a week ago. On presentation to the ER, chest x-ray shows diffuse airspace disease atypical infection versus edema. Also patient was noted to have elevated troponin and BNP. Patient was further evaluated by CT abdomen and pelvis which shows massively distended bladder with some bilateral hydronephrosis, subsequently Vasquez was placed. Patient also with significant leukocytosis as well as tachycardic and elevated lactic acid level and subsequently code sepsis was called. Hospitalist service requested admit the patient for further evaluation management. The patient denies any headache, blurry vision, sore throat, trouble swallowing, trouble with speech, chest pain, SOB, cough, fever, chills, N/V/D, abd pain, urinary symptoms, constipation, recent travels, sick contacts, focal or generalized neurological symptoms, falls, injuries, rashes, contact with COVID-19 diagnosed patients, hematemesis, melena, hemoptysis, hematuria, rashes, denies starting any new medications and denies any other concerns or problems besides as mentioned above. REVIEW OF SYSTEMS:  A comprehensive review of systems was negative except for that written in the History of Present Illness.      Past Medical History:   Diagnosis Date    CAD (coronary artery disease) 11/10/2016 NSTEMI & 2 stents    Deafness 10/28/2012    DM (diabetes mellitus) ([facility] [address]     Elevated cholesterol     Hypertension     NSTEMI (non-ST elevated myocardial infarction) ([facility] [address] 11/10/2016      Past Surgical History:   Procedure Laterality Date    COLONOSCOPY N/A 6/28/2018    COLONOSCOPY performed by Alphonso Pelayo MD at [address] [place] [address] St  11/11/2016    2 stents     Prior to Admission medications    Medication Sig Start Date End Date Taking? Authorizing Provider   ivabradine (CORLANOR) 5 mg tablet Take 0.5 Tablets by mouth two (2) times daily (with meals). 12/20/22   DieDalia hermosillo NP   furosemide (LASIX) 20 mg tablet Take 1 Tablet by mouth two (2) times a day. 12/19/22   Dalia Mccormick NP   dapagliflozin (FARXIGA) 10 mg tab tablet Take 1 Tablet by mouth daily. 12/8/22   Juanjo Youngblood MD   glipiZIDE (GLUCOTROL) 5 mg tablet Take 1 tablet by mouth twice daily 12/2/22   Juan Luis Eason MD   tamsulosin [facility]) 0.4 mg capsule Take 1 capsule by mouth once daily 11/22/22   Juan Luis Eason MD   ipratropium (ATROVENT) 21 mcg (0.03 %) nasal spray 2 Sprays by Both Nostrils route every twelve (12) hours. 11/21/22   Juan Luis Eason MD   nitroglycerin (NITROSTAT) 0.4 mg SL tablet 1 Tablet by SubLINGual route every five (5) minutes as needed for Chest Pain. Up to 3 doses. 11/16/22   Juanjo Youngblood MD   ergocalciferol (ERGOCALCIFEROL) 1,250 mcg (50,000 unit) capsule Take 1 Capsule by mouth every seven (7) days. 10/14/22   Juan Luis Eason MD   Januvia 50 mg tablet Take 1 tablet by mouth once daily 9/23/22   Juan Luis Eason MD   polyethylene glycol (MIRALAX) 17 gram/dose powder Take 17 g by mouth daily. 5/31/22   Juan Luis Eason MD   clopidogreL (Plavix) 75 mg tab Take 1 Tablet by mouth daily for 360 days.  Indications: a sudden worsening of angina called acute coronary syndrome 2/28/22 2/23/23  Zabrina Bradshaw MD   rosuvastatin (CRESTOR) 20 mg tablet Take 1 Tablet by mouth nightly. 2/28/22   Susanna Primrose, MD   aspirin delayed-release 81 mg tablet Take 1 Tab by mouth. Provider, Historical     No Known Allergies   Family History   Problem Relation Age of Onset    Heart Disease Father     Heart Attack Father     Hypertension Mother     Elevated Lipids Brother     Elevated Lipids Brother     No Known Problems Sister     Elevated Lipids Brother     No Known Problems Son     No Known Problems Daughter     Anesth Problems Neg Hx       Social History:  reports that he has never smoked. He has never been exposed to tobacco smoke. He has never used smokeless tobacco. He reports current alcohol use of about 2.0 standard drinks per week. He reports that he does not use drugs. Social Determinants of Health     Tobacco Use: Low Risk     Smoking Tobacco Use: Never    Smokeless Tobacco Use: Never    Passive Exposure: Never   Alcohol Use: Not on file   Financial Resource Strain: Medium Risk    Difficulty of Paying Living Expenses: Somewhat hard   Food Insecurity: Food Insecurity Present    Worried About Running Out of Food in the Last Year: Never true    Ran Out of Food in the Last Year: Often true   Transportation Needs: Not on file   Physical Activity: Not on file   Stress: Not on file   Social Connections: Not on file   Intimate Partner Violence: Not on file   Depression: Not at risk    PHQ-2 Score: 0   Housing Stability: Not on file        Family and social history were personally reviewed, all pertinent and relevant details are outlined as above.     Objective:   Visit Vitals  BP 93/61   Pulse (!) 119   Temp 98.8 °F (37.1 °C)   Resp 24   SpO2 92%    O2 Flow Rate (L/min): 4 l/min O2 Device: Nasal cannula    PHYSICAL EXAM:   General: Alert x oriented x 3, awake, no acute distress, resting in bed, pleasant male, appears to be stated age  HEENT: PEERL, EOMI, moist mucus membranes  Neck: Supple, no JVD, no meningeal signs  Chest: Bibasilar crackles  CVS: RRR, S1 S2 heard, no murmurs/rubs/gallops  Abd: Soft, non-tender, non-distended, +bowel sounds   Ext: No clubbing, no cyanosis, bilateral lower extremity pitting edema  Neuro/Psych: Pleasant mood and affect, CN 2-12 grossly intact, sensory grossly within normal limit, Strength 5/5 in all extremities, DTR 1+ x 4  Cap refill: Brisk, less than 3 seconds  Pulses: 2+, symmetric in all extremities  Skin: Warm, dry, without rashes or lesions    Data Review: All diagnostic labs and studies have been reviewed. Abnormal Labs Reviewed   CBC WITH AUTOMATED DIFF - Abnormal; Notable for the following components:       Result Value    WBC 25.1 (*)     HGB 9.8 (*)     HCT 33.1 (*)     MCV 77.3 (*)     MCH 22.9 (*)     MCHC 29.6 (*)     RDW 18.6 (*)     PLATELET 176 (*)     NEUTROPHILS 94 (*)     LYMPHOCYTES 1 (*)     MONOCYTES 4 (*)     IMMATURE GRANULOCYTES 1 (*)     ABS. NEUTROPHILS 23.5 (*)     ABS. LYMPHOCYTES 0.3 (*)     ABS. IMM.  GRANS. 0.3 (*)     All other components within normal limits   METABOLIC PANEL, COMPREHENSIVE - Abnormal; Notable for the following components:    Sodium 130 (*)     CO2 18 (*)     Glucose 458 (*)     BUN 62 (*)     Creatinine 2.63 (*)     BUN/Creatinine ratio 24 (*)     eGFR 25 (*)     AST (SGOT) 11 (*)     Albumin 3.4 (*)     Globulin 4.5 (*)     A-G Ratio 0.8 (*)     All other components within normal limits   NT-PRO BNP - Abnormal; Notable for the following components:    NT pro-BNP 6,905 (*)     All other components within normal limits   TROPONIN-HIGH SENSITIVITY - Abnormal; Notable for the following components:    Troponin-High Sensitivity 1,096 (*)     All other components within normal limits   LACTIC ACID - Abnormal; Notable for the following components:    Lactic acid 5.2 (*)     All other components within normal limits   URINALYSIS W/MICROSCOPIC - Abnormal; Notable for the following components:    Protein TRACE (*)     Glucose >1,000 (*)     All other components within normal limits       All Micro Results       Procedure Component Value Units Date/Time    RESPIRATORY VIRUS PANEL W/COVID-19, PCR [[id number]]     Order Status: Sent Specimen: NASOPHARYNGEAL SWAB     CULTURE, BLOOD, PAIRED [[id number]] Collected: 12/22/22 1339    Order Status: Sent Specimen: Blood Updated: 12/22/22 1451    CULTURE, URINE [[id number]] Collected: 12/22/22 1423    Order Status: Sent Specimen: Cath Urine Updated: 12/22/22 1429            IMAGING:   CT ABD PELV WO CONT   Final Result      1. Bladder is massively distended with mild bilateral hydronephrosis. May   indicate bladder outlet obstruction. Prostate is mildly enlarged   2. Gallstones. No acute cholecystitis   3. Small bilateral pleural effusions      XR CHEST PA LAT   Final Result   Widespread interstitial opacities bilateral mid to lower lung opacities   concerning for edema and/or atypical infection. Small bilateral pleural   effusions. XR ABD (KUB)   Final Result      1. No evidence of ileus or obstruction. Moderate colorectal stool burden,   correlate for constipation and/or impaction. 2.   Suspected patchy bibasilar atelectasis/airspace disease and small bilateral   pleural effusions. 3.  Cholelithiasis.                ECG/ECHO:    Results for orders placed or performed during the hospital encounter of 12/22/22   EKG, 12 LEAD, INITIAL   Result Value Ref Range    Ventricular Rate 119 BPM    Atrial Rate 119 BPM    P-R Interval 134 ms    QRS Duration 76 ms    Q-T Interval 322 ms    QTC Calculation (Bezet) 452 ms    Calculated P Axis 76 degrees    Calculated R Axis 101 degrees    Calculated T Axis -135 degrees    Diagnosis       Sinus tachycardia  Biatrial enlargement  Rightward axis  Marked ST abnormality, possible inferior subendocardial injury  Abnormal ECG  When compared with ECG of 16-DEC-2022 12:58,  ST no longer depressed in Anterior leads  T wave inversion now evident in Inferior leads          Assessment:   Given the patient's current clinical presentation, there is a high level of concern for decompensation if discharged from the emergency department. Complex decision making was performed, which includes reviewing the patient's available past medical records, laboratory results, and imaging studies.     Active Problems:    Sepsis ([address] (12/22/2022)        Plan:     Acute hypoxic respiratory failure  -This is likely a combination of pneumonia and CHF, respiratory culture pending  -Currently requiring supplemental oxygen of 4 L  -Manage underlying etiologies, wean oxygen as tolerated    Sepsis  -Patient with tachycardia, leukocytosis and lactic acidosis  -Sepsis likely related to pneumonia, UA was negative  -Start empiric antibiotic with Rocephin and Zithromax, follow cultures  -Sepsis reassessment completed    Pneumonia  -Chest x-ray shows diffuse airspace disease, edema versus infection  -Patient with picture of sepsis, empiric antibiotics with Rocephin and Zithromax started  -Follow blood cultures, respiratory viral panel pending    Acute on congestive heart failure  -Acute on chronic systolic CHF, NYHA class IV on admission  -Patient with known history of ischemic cardiomyopathy with EF of 25 to 30% on recent echo  -Patient with elevated troponin, elevated BNP and chest x-ray showing diffuse airspace disease concerning for edema  -Patient had received 20 mg IV Lasix in the ER, further diuretic management per cardiology/nephrology  -Resume cardiac medications from home    TANNER  -TANNER on CKD stage III  -This could be likely due to cardiorenal syndrome and also postobstructive due to bladder outlet obstruction  -Nephrology has been consulted for further evaluation    Bladder outlet obstruction  -Patient with distended bladder and bilateral hydronephrosis, this is likely due to BPH  -Vasquez catheter was placed in the emergency room  -Consult urology for further evaluation, maintain Vasquez    Diabetes type 2 with hyperglycemia  -Resume glipizide from home, start insulin sliding scale coverage  -Check hemoglobin A1c consult diabetes management team    Coronary artery disease  -Patient with history of CABG and subsequent PCI  -On dual antiplatelet with aspirin and Plavix, continue statin    Estimated length of stay is 2 midnights. DIET: ADULT DIET Regular; 3 carb choices (45 gm/meal); Low Sodium (2 gm)   ISOLATION PRECAUTIONS: There are currently no Active Isolations  CODE STATUS: Full Code   DVT PROPHYLAXIS: Heparin  FUNCTIONAL STATUS PRIOR TO HOSPITALIZATION: Fully active and ambulatory; able to carry on all self-care without restriction. Ambulatory status/function: By self   EARLY MOBILITY ASSESSMENT: Recommend an assessment from physical therapy and/or occupational therapy  ANTICIPATED DISCHARGE: 24-48 hours. ANTICIPATED DISPOSITION: Home  EMERGENCY CONTACT/SURROGATE DECISION MAKER:     CRITICAL CARE WAS PERFORMED FOR THIS ENCOUNTER: NO.      Signed By: Cristina Hager MD     December 22, 2022         Please note that this dictation may have been completed with Dragon, the [employer] voice recognition software. Quite often unanticipated grammatical, syntax, homophones, and other interpretive errors are inadvertently transcribed by the computer software. Please disregard these errors. Please excuse any errors that have escaped final proofreading.

## 2022-12-22 NOTE — CONSULTS
CRITICAL CARE ADMISSION NOTE      Name: Anna Webber   : 1950   MRN: [id number]   Date: 2022      Reason for ICU Admission: Cardiogenic shock     ICU PROBLEM LIST   Acute on chronic HFrEF (25-30%)  Cardiogenic shock  Lactic acidosis  NSTEMI  Acute hypoxic respiratory failure  TANNER  CAD  DM    HISTORY OF PRESENT ILLNESS:   Pt is a 71 y.o M w/ PMH as noted above who presented to ED due to progressive dyspnea. History obtained from pt and spouse at bedside. Onset 3-4 days ago, DONALDSON, abdominal discomfort and distension with associated anorexia not improved by increasing home lasix dose. States difficulty voiding despite lasix doses. Of note pt w/ recent frequent admissions for HFrEF. Denies sick contacts, CP, palpitations,     Pt tachycardic, hypotensive, hypoxemic on presentation. Labs w/ troponemia, BNP above baseline, elevated lactate, and TANNER. Imaging w/ bilateral pulmonary edema, bl pulmonary effusions, bladder distension 2/2 BPH and hydronephrosis. Pt pan cultured, given dose of abx and dose of IVP lasix. Pt placed on BiPAP w/ improvement in oxygenation. Vasquez placed w/ bladder decompression. Pt initially admitted to hospitalist service, however CCM consulted due to worsening clinical status. 24 HOUR EVENTS:   Will admit to CCU for closer monitoring    POCUS eval w/ bl moderate pleural effusions, b lines/edema, EF 20-30%, IVC dilated and non compressible w/ reflux noted on VExUS. Pt states wish to forgo intubation of CPR in event of cardiopulmonary collapse.     NEUROLOGICAL:    Mentation appropriate, however difficulty responding due to acute status  Monitor for changes    PULMONOLOGY:   BiPAP to assist w/ WOB given pulmonary edema  Diuresis as below  Pt states wish to be DNI    CARDIOVASCULAR:   Suspect cardiorenal syndrome 2/2 post obstructive renal failure  Levophed gtt, may require inotropic support  Trend lct, cardiac enzymes  Will give additional lasix, if no response may require RRT for fluid removal if pt amenable   Resume GDMT as tolerated  ASA, plavix, statin    GASTROINTESTINAL:   NPO while on BiPAP     RENAL/ELECTROLYTE/FLUIDS:   Strict I/Os  Suspect post obstructive failure  RFP, replace lytes PRN  Diuresis as above    ENDOCRINE:   Hold PO anti-glycemic  SSI, basal PRN   Glucose goal 120-180    HEMATOLOGY/ONCOLOGY:   Heparin ppx    ID/MICRO:   Low suspicion for acute infectious process, however will continue rocephin and azithro for now    Follow Bcx and PCT trend    ICU DAILY CHECKLIST     Code Status:DNR/DNI  DVT Prophylaxis:heparin  T/L/D: PIV, donnelly  SUP: N/A  Diet: NPO  Activity Level:ad jose f  ABCDEF Bundle/Checklist Completed:Yes  Disposition: Stay in ICU  Multidisciplinary Rounds Completed:  Pending  Patient/Family Updated: Yes    Tatiana   As noted above    SUBJECTIVE:   As noted above    Review of Systems:     Review of Systems   Unable to perform ROS: Acuity of condition      Past Medical History:      has a past medical history of CAD (coronary artery disease) (11/10/2016), Deafness (10/28/2012), DM (diabetes mellitus) ([facility] [address] Elevated cholesterol, Hypertension, and NSTEMI (non-ST elevated myocardial infarction) ([facility] [address] (11/10/2016). Past Surgical History:      has a past surgical history that includes hx appendectomy; pr cardiac surg procedure unlist (11/11/2016); and colonoscopy (N/A, 6/28/2018). Home Medications:     Prior to Admission medications    Medication Sig Start Date End Date Taking? Authorizing Provider   ivabradine (CORLANOR) 5 mg tablet Take 0.5 Tablets by mouth two (2) times daily (with meals). 12/20/22   Delta Mccormick NP   furosemide (LASIX) 20 mg tablet Take 1 Tablet by mouth two (2) times a day. 12/19/22   Delta Mccormick NP   dapagliflozin (FARXIGA) 10 mg tab tablet Take 1 Tablet by mouth daily.  12/8/22   Monik Ambrose MD   glipiZIDE (GLUCOTROL) 5 mg tablet Take 1 tablet by mouth twice daily 12/2/22 Tigist Quintanilla MD   tamsulosin [facility]) 0.4 mg capsule Take 1 capsule by mouth once daily 11/22/22   Tigist Quintanilla MD   ipratropium (ATROVENT) 21 mcg (0.03 %) nasal spray 2 Sprays by Both Nostrils route every twelve (12) hours. 11/21/22   Tigist Quintanilla MD   nitroglycerin (NITROSTAT) 0.4 mg SL tablet 1 Tablet by SubLINGual route every five (5) minutes as needed for Chest Pain. Up to 3 doses. 11/16/22   Shantell Burton MD   ergocalciferol (ERGOCALCIFEROL) 1,250 mcg (50,000 unit) capsule Take 1 Capsule by mouth every seven (7) days. 10/14/22   Tigist Quintanilla MD   Januvia 50 mg tablet Take 1 tablet by mouth once daily 9/23/22   Tigist Quintanilla MD   polyethylene glycol (MIRALAX) 17 gram/dose powder Take 17 g by mouth daily. 5/31/22   Tigist Quintanilla MD   clopidogreL (Plavix) 75 mg tab Take 1 Tablet by mouth daily for 360 days. Indications: a sudden worsening of angina called acute coronary syndrome 2/28/22 2/23/23  Vira Mueller MD   rosuvastatin (CRESTOR) 20 mg tablet Take 1 Tablet by mouth nightly. 2/28/22   Vira Mueller MD   aspirin delayed-release 81 mg tablet Take 1 Tab by mouth. Provider, Historical       Allergies/Social/Family History:     No Known Allergies   Social History     Tobacco Use    Smoking status: Never     Passive exposure: Never    Smokeless tobacco: Never   Substance Use Topics    Alcohol use:  Yes     Alcohol/week: 2.0 standard drinks     Types: 1 Cans of beer, 1 Shots of liquor per week     Comment: rarely      Family History   Problem Relation Age of Onset    Heart Disease Father     Heart Attack Father     Hypertension Mother     Elevated Lipids Brother     Elevated Lipids Brother     No Known Problems Sister     Elevated Lipids Brother     No Known Problems Son     No Known Problems Daughter     Anesth Problems Neg Hx          OBJECTIVE:     Labs and Data: Reviewed 12/22/22  Medications: Reviewed 12/22/22  Imaging: Reviewed 12/22/22    Physical Exam  Vitals and nursing note reviewed. Constitutional:       General: He is in acute distress. Appearance: He is normal weight. He is ill-appearing. HENT:      Head: Normocephalic and atraumatic. Nose: Nose normal. No congestion. Mouth/Throat:      Mouth: Mucous membranes are moist.      Pharynx: Oropharynx is clear. No oropharyngeal exudate. Eyes:      General: No scleral icterus. Extraocular Movements: Extraocular movements intact. Conjunctiva/sclera: Conjunctivae normal.      Pupils: Pupils are equal, round, and reactive to light. Cardiovascular:      Rate and Rhythm: Regular rhythm. Tachycardia present. Pulses: Normal pulses. Heart sounds: No murmur heard. No friction rub. No gallop. Pulmonary:      Effort: Respiratory distress present. Breath sounds: Rales present. Comments: BiPAP, increased WOB  Abdominal:      General: Bowel sounds are normal. There is distension. Palpations: Abdomen is soft. Tenderness: There is no abdominal tenderness. There is no guarding. Comments: Bowel wall edema   Genitourinary:     Comments: donnelly  Musculoskeletal:      Cervical back: Normal range of motion and neck supple. No rigidity. Right lower leg: Edema present. Left lower leg: Edema present. Skin:     Capillary Refill: Capillary refill takes 2 to 3 seconds. Coloration: Skin is not jaundiced. Findings: No bruising. Neurological:      General: No focal deficit present. Mental Status: He is alert and oriented to person, place, and time. Mental status is at baseline. Cranial Nerves: No cranial nerve deficit.    Psychiatric:         Mood and Affect: Mood normal.         Behavior: Behavior normal.        Visit Vitals  BP 93/61   Pulse (!) 119   Temp 98.8 °F (37.1 °C)   Resp 24   SpO2 100%    O2 Flow Rate (L/min): 4 l/min O2 Device: BIPAP Temp (24hrs), Av.3 °F (36.8 °C), Min:97.8 °F (36.6 °C), Max:98.8 °F (37.1 °C)           Intake/Output:   No intake or output data in the 24 hours ending 12/22/22 1724    Imaging    12/11/22    ECHO ADULT FOLLOW-UP OR LIMITED 12/14/2022 12/14/2022    Interpretation Summary    Left Ventricle: Severely reduced left ventricular systolic function with a visually estimated EF of 25 - 30%. Not well visualized. Left ventricle size is normal. Normal wall thickness. Normal wall motion. Normal diastolic function. Mitral Valve: Thickened leaflet. Moderate annular calcification of the mitral valve. Mild regurgitation. Contrast used: Definity. Note: image quality is poor, and even with Definity contrast, EF is very difficult to estimate, and the reported figure is approximate at best. Consider alternative imaging modalities such as MUGA or cardiac MRI to more accurately assess. Signed by: Luis Alberto Platt MD on 12/14/2022  4:46 PM           CRITICAL CARE DOCUMENTATION  I had a face to face encounter with the patient, reviewed and interpreted patient data including clinical events, labs, images, vital signs, I/O's, and examined patient. I have discussed the case and the plan and management of the patient's care with the consulting services, the bedside nurses and the respiratory therapist.      NOTE OF PERSONAL INVOLVEMENT IN CARE   This patient has a high probability of imminent, clinically significant deterioration, which requires the highest level of preparedness to intervene urgently. I participated in the decision-making and personally managed or directed the management of the following life and organ supporting interventions that required my frequent assessment to treat or prevent imminent deterioration. I personally spent 60 minutes of critical care time. This is time spent at this critically ill patient's bedside actively involved in patient care as well as the coordination of care. This does not include any procedural time which has been billed separately. Walker Mcgee MD  Staff Intensivist  [facility]

## 2022-12-22 NOTE — PROGRESS NOTES
Rapid Response called overhead at this time, RRT responding. Rapid called for decreased O2 sats, 80% on non- re-breather and hypotension 77/47. Dr. Sirena Alvarez at bedside and orders received to place patient on Bipap, respiratory therapist at bedside.     ICU MD consulted    RRT will continue to follow

## 2022-12-23 LAB
ALBUMIN SERPL-MCNC: 3.4 G/DL (ref 3.5–5)
ALBUMIN/GLOB SERPL: 0.9 (ref 1.1–2.2)
ALP SERPL-CCNC: 99 U/L (ref 45–117)
ALT SERPL-CCNC: 36 U/L (ref 12–78)
ANION GAP SERPL CALC-SCNC: 11 MMOL/L (ref 5–15)
ANION GAP SERPL CALC-SCNC: 12 MMOL/L (ref 5–15)
AST SERPL-CCNC: 47 U/L (ref 15–37)
BACTERIA SPEC CULT: NORMAL
BASOPHILS # BLD: 0 K/UL (ref 0–0.1)
BASOPHILS NFR BLD: 0 % (ref 0–1)
BILIRUB SERPL-MCNC: 0.5 MG/DL (ref 0.2–1)
BUN SERPL-MCNC: 64 MG/DL (ref 6–20)
BUN SERPL-MCNC: 66 MG/DL (ref 6–20)
BUN/CREAT SERPL: 25 (ref 12–20)
BUN/CREAT SERPL: 27 (ref 12–20)
CALCIUM SERPL-MCNC: 9.3 MG/DL (ref 8.5–10.1)
CALCIUM SERPL-MCNC: 9.7 MG/DL (ref 8.5–10.1)
CHLORIDE SERPL-SCNC: 100 MMOL/L (ref 97–108)
CHLORIDE SERPL-SCNC: 99 MMOL/L (ref 97–108)
CO2 SERPL-SCNC: 24 MMOL/L (ref 21–32)
CO2 SERPL-SCNC: 25 MMOL/L (ref 21–32)
CREAT SERPL-MCNC: 2.49 MG/DL (ref 0.7–1.3)
CREAT SERPL-MCNC: 2.55 MG/DL (ref 0.7–1.3)
DIFFERENTIAL METHOD BLD: ABNORMAL
EOSINOPHIL # BLD: 0 K/UL (ref 0–0.4)
EOSINOPHIL NFR BLD: 0 % (ref 0–7)
ERYTHROCYTE [DISTWIDTH] IN BLOOD BY AUTOMATED COUNT: 18.5 % (ref 11.5–14.5)
GLOBULIN SER CALC-MCNC: 3.7 G/DL (ref 2–4)
GLUCOSE BLD STRIP.AUTO-MCNC: 188 MG/DL (ref 65–117)
GLUCOSE BLD STRIP.AUTO-MCNC: 268 MG/DL (ref 65–117)
GLUCOSE BLD STRIP.AUTO-MCNC: 361 MG/DL (ref 65–117)
GLUCOSE SERPL-MCNC: 345 MG/DL (ref 65–100)
GLUCOSE SERPL-MCNC: 371 MG/DL (ref 65–100)
HCT VFR BLD AUTO: 32.8 % (ref 36.6–50.3)
HGB BLD-MCNC: 9.8 G/DL (ref 12.1–17)
IMM GRANULOCYTES # BLD AUTO: 0.2 K/UL (ref 0–0.04)
IMM GRANULOCYTES NFR BLD AUTO: 1 % (ref 0–0.5)
LACTATE SERPL-SCNC: 1.6 MMOL/L (ref 0.4–2)
LACTATE SERPL-SCNC: 1.7 MMOL/L (ref 0.4–2)
LYMPHOCYTES # BLD: 0.7 K/UL (ref 0.8–3.5)
LYMPHOCYTES NFR BLD: 4 % (ref 12–49)
MAGNESIUM SERPL-MCNC: 2.7 MG/DL (ref 1.6–2.4)
MAGNESIUM SERPL-MCNC: 2.8 MG/DL (ref 1.6–2.4)
MCH RBC QN AUTO: 23.6 PG (ref 26–34)
MCHC RBC AUTO-ENTMCNC: 29.9 G/DL (ref 30–36.5)
MCV RBC AUTO: 78.8 FL (ref 80–99)
MONOCYTES # BLD: 1.3 K/UL (ref 0–1)
MONOCYTES NFR BLD: 7 % (ref 5–13)
NEUTS SEG # BLD: 16.3 K/UL (ref 1.8–8)
NEUTS SEG NFR BLD: 88 % (ref 32–75)
NRBC # BLD: 0 K/UL (ref 0–0.01)
NRBC BLD-RTO: 0 PER 100 WBC
PHOSPHATE SERPL-MCNC: 5.5 MG/DL (ref 2.6–4.7)
PLATELET # BLD AUTO: 418 K/UL (ref 150–400)
PLATELET COMMENTS,PCOM: ABNORMAL
PMV BLD AUTO: 11.1 FL (ref 8.9–12.9)
POTASSIUM SERPL-SCNC: 4.2 MMOL/L (ref 3.5–5.1)
POTASSIUM SERPL-SCNC: 4.7 MMOL/L (ref 3.5–5.1)
PROCALCITONIN SERPL-MCNC: 0.55 NG/ML
PROT SERPL-MCNC: 7.1 G/DL (ref 6.4–8.2)
RBC # BLD AUTO: 4.16 M/UL (ref 4.1–5.7)
RBC MORPH BLD: ABNORMAL
SERVICE CMNT-IMP: ABNORMAL
SERVICE CMNT-IMP: NORMAL
SODIUM SERPL-SCNC: 135 MMOL/L (ref 136–145)
SODIUM SERPL-SCNC: 136 MMOL/L (ref 136–145)
WBC # BLD AUTO: 18.5 K/UL (ref 4.1–11.1)

## 2022-12-23 PROCEDURE — 36415 COLL VENOUS BLD VENIPUNCTURE: CPT

## 2022-12-23 PROCEDURE — 74011000250 HC RX REV CODE- 250: Performed by: STUDENT IN AN ORGANIZED HEALTH CARE EDUCATION/TRAINING PROGRAM

## 2022-12-23 PROCEDURE — 83605 ASSAY OF LACTIC ACID: CPT

## 2022-12-23 PROCEDURE — 84145 PROCALCITONIN (PCT): CPT

## 2022-12-23 PROCEDURE — 85025 COMPLETE CBC W/AUTO DIFF WBC: CPT

## 2022-12-23 PROCEDURE — P9045 ALBUMIN (HUMAN), 5%, 250 ML: HCPCS | Performed by: STUDENT IN AN ORGANIZED HEALTH CARE EDUCATION/TRAINING PROGRAM

## 2022-12-23 PROCEDURE — 99233 SBSQ HOSP IP/OBS HIGH 50: CPT | Performed by: CLINICAL NURSE SPECIALIST

## 2022-12-23 PROCEDURE — 84100 ASSAY OF PHOSPHORUS: CPT

## 2022-12-23 PROCEDURE — 65620000000 HC RM CCU GENERAL

## 2022-12-23 PROCEDURE — 74011250636 HC RX REV CODE- 250/636: Performed by: STUDENT IN AN ORGANIZED HEALTH CARE EDUCATION/TRAINING PROGRAM

## 2022-12-23 PROCEDURE — 74011636637 HC RX REV CODE- 636/637: Performed by: STUDENT IN AN ORGANIZED HEALTH CARE EDUCATION/TRAINING PROGRAM

## 2022-12-23 PROCEDURE — 74011250637 HC RX REV CODE- 250/637: Performed by: STUDENT IN AN ORGANIZED HEALTH CARE EDUCATION/TRAINING PROGRAM

## 2022-12-23 PROCEDURE — 80053 COMPREHEN METABOLIC PANEL: CPT

## 2022-12-23 PROCEDURE — 83735 ASSAY OF MAGNESIUM: CPT

## 2022-12-23 PROCEDURE — 74011000258 HC RX REV CODE- 258: Performed by: STUDENT IN AN ORGANIZED HEALTH CARE EDUCATION/TRAINING PROGRAM

## 2022-12-23 PROCEDURE — 74011250637 HC RX REV CODE- 250/637: Performed by: FAMILY MEDICINE

## 2022-12-23 PROCEDURE — 82962 GLUCOSE BLOOD TEST: CPT

## 2022-12-23 RX ORDER — AMOXICILLIN 250 MG
1 CAPSULE ORAL
Status: DISCONTINUED | OUTPATIENT
Start: 2022-12-23 | End: 2023-01-19 | Stop reason: HOSPADM

## 2022-12-23 RX ORDER — INSULIN GLARGINE 100 [IU]/ML
12 INJECTION, SOLUTION SUBCUTANEOUS DAILY
Status: DISCONTINUED | OUTPATIENT
Start: 2022-12-23 | End: 2022-12-24

## 2022-12-23 RX ORDER — FACIAL-BODY WIPES
10 EACH TOPICAL
Status: DISCONTINUED | OUTPATIENT
Start: 2022-12-23 | End: 2023-01-19 | Stop reason: HOSPADM

## 2022-12-23 RX ORDER — ALBUMIN HUMAN 50 G/1000ML
12.5 SOLUTION INTRAVENOUS ONCE
Status: COMPLETED | OUTPATIENT
Start: 2022-12-23 | End: 2022-12-23

## 2022-12-23 RX ORDER — ROSUVASTATIN CALCIUM 10 MG/1
10 TABLET, COATED ORAL
Status: DISCONTINUED | OUTPATIENT
Start: 2022-12-23 | End: 2022-12-29

## 2022-12-23 RX ORDER — IBUPROFEN 200 MG
4 TABLET ORAL AS NEEDED
Status: DISCONTINUED | OUTPATIENT
Start: 2022-12-23 | End: 2023-01-19 | Stop reason: HOSPADM

## 2022-12-23 RX ORDER — INSULIN LISPRO 100 [IU]/ML
0.05 INJECTION, SOLUTION INTRAVENOUS; SUBCUTANEOUS
Status: DISCONTINUED | OUTPATIENT
Start: 2022-12-23 | End: 2023-01-02

## 2022-12-23 RX ORDER — FUROSEMIDE 10 MG/ML
40 INJECTION INTRAMUSCULAR; INTRAVENOUS 2 TIMES DAILY
Status: DISCONTINUED | OUTPATIENT
Start: 2022-12-23 | End: 2022-12-23

## 2022-12-23 RX ORDER — ALBUMIN HUMAN 50 G/1000ML
SOLUTION INTRAVENOUS
Status: DISPENSED
Start: 2022-12-23 | End: 2022-12-24

## 2022-12-23 RX ORDER — IBUPROFEN 200 MG
4 TABLET ORAL AS NEEDED
Status: CANCELLED | OUTPATIENT
Start: 2022-12-23

## 2022-12-23 RX ORDER — INSULIN LISPRO 100 [IU]/ML
INJECTION, SOLUTION INTRAVENOUS; SUBCUTANEOUS
Status: DISCONTINUED | OUTPATIENT
Start: 2022-12-23 | End: 2023-01-05

## 2022-12-23 RX ORDER — NOREPINEPHRINE BITARTRATE/D5W 8 MG/250ML
.5-2 PLASTIC BAG, INJECTION (ML) INTRAVENOUS
Status: DISCONTINUED | OUTPATIENT
Start: 2022-12-23 | End: 2022-12-24

## 2022-12-23 RX ADMIN — ALBUMIN (HUMAN) 12.5 G: 12.5 INJECTION, SOLUTION INTRAVENOUS at 11:54

## 2022-12-23 RX ADMIN — INSULIN GLARGINE 12 UNITS: 100 INJECTION, SOLUTION SUBCUTANEOUS at 11:55

## 2022-12-23 RX ADMIN — SODIUM CHLORIDE, PRESERVATIVE FREE 10 ML: 5 INJECTION INTRAVENOUS at 21:00

## 2022-12-23 RX ADMIN — SODIUM CHLORIDE, POTASSIUM CHLORIDE, SODIUM LACTATE AND CALCIUM CHLORIDE 500 ML: 600; 310; 30; 20 INJECTION, SOLUTION INTRAVENOUS at 17:00

## 2022-12-23 RX ADMIN — ERGOCALCIFEROL 50000 UNITS: 1.25 CAPSULE ORAL at 09:23

## 2022-12-23 RX ADMIN — IPRATROPIUM BROMIDE 2 SPRAY: 21 SPRAY, METERED NASAL at 20:59

## 2022-12-23 RX ADMIN — TAMSULOSIN HYDROCHLORIDE 0.4 MG: 0.4 CAPSULE ORAL at 09:23

## 2022-12-23 RX ADMIN — ENOXAPARIN SODIUM 30 MG: 100 INJECTION SUBCUTANEOUS at 15:30

## 2022-12-23 RX ADMIN — ALBUMIN (HUMAN) 12.5 G: 12.5 INJECTION, SOLUTION INTRAVENOUS at 14:21

## 2022-12-23 RX ADMIN — SODIUM CHLORIDE, PRESERVATIVE FREE 10 ML: 5 INJECTION INTRAVENOUS at 06:05

## 2022-12-23 RX ADMIN — ASPIRIN 81 MG: 81 TABLET, COATED ORAL at 09:23

## 2022-12-23 RX ADMIN — Medication 3 UNITS: at 17:30

## 2022-12-23 RX ADMIN — DEXTROSE MONOHYDRATE 4 MCG/MIN: 50 INJECTION, SOLUTION INTRAVENOUS at 22:56

## 2022-12-23 RX ADMIN — SODIUM CHLORIDE, PRESERVATIVE FREE 10 ML: 5 INJECTION INTRAVENOUS at 14:00

## 2022-12-23 RX ADMIN — CLOPIDOGREL BISULFATE 75 MG: 75 TABLET ORAL at 09:23

## 2022-12-23 RX ADMIN — GLIPIZIDE 5 MG: 5 TABLET ORAL at 09:23

## 2022-12-23 RX ADMIN — IVABRADINE 2.5 MG: 5 TABLET, FILM COATED ORAL at 08:50

## 2022-12-23 RX ADMIN — Medication 3 UNITS: at 11:55

## 2022-12-23 RX ADMIN — ROSUVASTATIN 10 MG: 10 TABLET, FILM COATED ORAL at 21:00

## 2022-12-23 RX ADMIN — SODIUM CHLORIDE, PRESERVATIVE FREE 10 ML: 5 INJECTION INTRAVENOUS at 06:06

## 2022-12-23 RX ADMIN — HEPARIN SODIUM 5000 UNITS: 5000 INJECTION INTRAVENOUS; SUBCUTANEOUS at 03:07

## 2022-12-23 RX ADMIN — POLYETHYLENE GLYCOL 3350 17 G: 17 POWDER, FOR SOLUTION ORAL at 00:08

## 2022-12-23 RX ADMIN — SENNOSIDES AND DOCUSATE SODIUM 1 TABLET: 50; 8.6 TABLET ORAL at 11:55

## 2022-12-23 RX ADMIN — IPRATROPIUM BROMIDE 2 SPRAY: 21 SPRAY, METERED NASAL at 09:23

## 2022-12-23 RX ADMIN — SODIUM CHLORIDE, PRESERVATIVE FREE 10 ML: 5 INJECTION INTRAVENOUS at 14:21

## 2022-12-23 RX ADMIN — SENNOSIDES AND DOCUSATE SODIUM 1 TABLET: 50; 8.6 TABLET ORAL at 22:56

## 2022-12-23 NOTE — PROGRESS NOTES
SARA: Anticipate discharge home with [facility] HH through [facility] v SNF pending medical progress. Transportation likely in car with family. Reason for Readmission:   Sepsis/CHF           RUR Score/Risk Level:   24%      PCP: First and Last name:  Dr. Ranjith Caraballo   Name of Practice:    Are you a current patient: Yes/No: yes   Approximate date of last visit:    Can you participate in a virtual visit with your PCP:     Is a Care Conference indicated:   Yes as patient has been admitted three times in December for cardiac issues. Did you attend your follow up appointment (s): If not, why not:  Yes       Resources/supports as identified by patient/family:   need additional support and would like inpatient rehab or SNF       Top Challenges facing patient (as identified by patient/family and CM): Family does not feel previous discharge plans have been beneficial and patient's health has continued to decline    Finances/Medication cost?    No concerns  Transportation    Patient and wife drive  Support system or lack thereof? Family   Living arrangements? At home w/wife        Self-care/ADLs/Cognition?    independent        Current Advanced Directive/Advance Care Plan:             Plan for utilizing home health:   Currently open with [facility]             Transition of Care Plan:    Based on readmission, the patient's previous Plan of Care   has been evaluated and/or modified. The current Transition of Care Plan is:    CM met with patient and wife at bedside. Patient is alert and oriented x4. Demographics confirmed. Patient and wife live in a two story home with 6 steps to enter and 16 steps to second floor bedroom. No DME. Patient is independent in ADLs/ambulation and drives. Hx: cardiomyopathy, CAD s/p stenting, NSTEMI, diabetes II, HTN, CKD III. PCP confirmed and pharmacy used is [facility] on [address]. Wife is requesting patient be sent to rehab before returning home. CM provided a list of SNF and IPR facilities. CM also submitted [facility] HH referral to [facility] via [address] Emergency contact: Rupinder Kendall, wife, [phone]    Medicare pt has received, reviewed, and signed IM letter informing them of their right to appeal the discharge. Signed copied has been placed on pt bedside chart. Care Management Interventions  PCP Verified by CM: Yes  Mode of Transport at Discharge:  Other (see comment) (in car with wife)  Transition of Care Consult (CM Consult): Home Health, SNF  [address]: No  Reason Outside [place]: Patient already serviced by other home care/hospice agency ([facility])  MyChart Signup: Yes  Discharge Durable Medical Equipment: No  Physical Therapy Consult: No  Occupational Therapy Consult: No  Speech Therapy Consult: No  Support Systems: Spouse/Significant Other, Child(sebastián)  Confirm Follow Up Transport: Self  The Plan for Transition of Care is Related to the Following Treatment Goals : home health v SNF  Discharge Location  Patient Expects to be Discharged to[de-identified] Home with home health        Readmission Assessment  Number of days since last admission?: 1-7 days [facility] 12/5-12/8 left heart cath w/stenting, [facility] 12/11-12/16 CHF and discharged with [facility])  Previous disposition: Home with Home Health  Who is being interviewed?: Caregiver  What was the patient's/caregiver's perception as to why they think they needed to return back to the hospital?: Other (Comment) (ongoing cardiac issues)  Did you visit your Primary Care Physician after you left the hospital, before you returned this time?: No  Why weren't you able to visit your PCP?: Did not have an appointment (phone contact for med refill)  Did you see a specialist, such as Cardiac, Pulmonary, Orthopedic Physician, etc. after you left the hospital?: Yes  Who advised the patient to return to the hospital?: [address] Staff  Does the patient report anything that got in the way of taking their medications?: No  In our efforts to provide the best possible care to you and others like you, can you think of anything that we could have done to help you after you left the hospital the first time, so that you might not have needed to return so soon?: Other (Comment), Arrange for more help when leaving the hospital      Kristan Bassett, [facility],[address]  [phone]

## 2022-12-23 NOTE — DIABETES MGMT
[facility]  DIABETES MANAGEMENT CONSULT    Consulted by  Duane Pack MD   for advanced nursing evaluation and care for inpatient blood glucose management. Evaluation and Action Plan   Carissa Easton is a 71year old gentleman, with Type 2 Diabetes with a recent A1C of 6.5%, who was admitted with acute hypoxic respiratory s/t acute on chronic HFrEF and TANNER. He was admitted 2 weeks ago for similar complaint and antihyperglycemic agents were adjusted on discharge. Metformin was stopped due to decline in GFR and Jardiance switched to [place] (unclear why). He was compliant with his Singh Bonine, [place] and Glipizide up until day prior to this hospitalization. His glucose was significantly elevated at 458 on admission, s/t insulin resistance from acute HFrEF, elevated lactate and impaired perfusion. GFR is 27 and would avoid therefore avoid GUILLORY. Please start basal/bolus insulin therapy at this time and target an inpatient BG goal of 140-180mg/dl. Management Rationale Action Plan   Medication   Basal needs Using low dose 0.2 units/kg/D based on low BMI Resume 12 units Lantus daily (as used with good glucose impact last admission)    If Fasting BG sustained over 180mgl/dl, increase to 0.3 units/kg/day. Reduce dose by 20% if NPO   Nutritional needs Using low sensitivity based on low BMI Start low dose humalog 0.05 units/kg/meal  3 units Humalog/meal    Hold if patient is NPO or consumes less than 50% of carbohydrates on meal tray    Advance by 2 units daily for persistent pre-prandial hyperglycemia     Corrective insulin Using normal sensitivity based on  Normal Sensitivity ACHS   Additional Recommendations. POC glucose ACHS    Consistent carbohydrate diet (60 grams CHO/meal). Patient interested in Glucerna    3. Please hold oral antihyperglycemic agents in the inpatient setting,      Will see again Tuesday if here.   Please PerfectServe with questions over the holiday weekend            Initial Presentation   Nikki Rojas is a 71 y.o. male who presented to the ED 12/22/22 with a 3-4 day c/o gradually worsening dyspnea, fatigue, chills, constipation and RLQ pain. He endorses difficulty voiding despite compliance with lasix. In the ED, he was hypoxic and started on supplemental O2 and diuresis. LAB: WBC 25.1, , GFR 25, Lac 5.2, Trop 1096, BNP 6905  CXR: Widespread interstitial opacities bilateral mid to lower lung opacities  concerning for edema and/or atypical infection. Small bilateral pleural  effusions. CT: CT revealed distended bladder  EKG: Sinus tachycardia   Biatrial enlargement   Rightward axis   Marked ST abnormality, possible inferior subendocardial injury   Marked ST abnormality, possible lateral subendocardial injury   Abnormal ECG   Did have a hospital admission for HFrEF 12/11/22-12/16/22    HX:   Past Medical History:   Diagnosis Date    CAD (coronary artery disease) 11/10/2016    NSTEMI & 2 stents    Deafness 10/28/2012    DM (diabetes mellitus) ([facility] [address]     Elevated cholesterol     Hypertension     NSTEMI (non-ST elevated myocardial infarction) ([facility] [address] 11/10/2016    CKD  CAD with CABG 2018    INITIAL DX:   Sepsis ([facility] [address] [A41.9]     Current Treatment     TX: IVF, Diuresis, Supplemental O2    Hospital Course   Clinical progress has been complicated by:   03/23: Initial admission with hospitalist service but with progressive hypoxia early requiring CCU transfer. Elevated troponin/BNP. BPH and hydronephrosis. Pt pan cultured, given dose of abx and dose of IVP lasix. Pt placed on BiPAP w/ improvement in oxygenation.     Diabetes History   Type 2 Diabetes  Ambulatory BG management provided by: PCP Chad Hill MD    Diabetes-related Medical History  Acute complications  Acute hyperglycemia  Neurological complications  Peripheral neuropathy  Microvascular disease  Nephropathy  Macrovascular disease  CAD  Other associated conditions     CHF    Diabetes Medication History  Key Antihyperglycemic Medications               dapagliflozin (FARXIGA) 10 mg tab tablet Take 1 Tablet by mouth daily. glipiZIDE (GLUCOTROL) 5 mg tablet Take 1 tablet by mouth twice daily    Januvia 50 mg tablet Take 1 tablet by mouth once daily             Diabetes self-management practices:   Eating pattern   Eats 3 small meals daily  [x] Breakfast  2 Eggs, Coffee  [x] Lunch   [place]  [x] Dinner   \"[place] Food\" Bread, rice, lentils   [x] Bedtime  [place]  [x] Snacks   Afternoon snack  [x] Beverages  Water, Coffee  Physical activity pattern   Sedentary   Monitoring pattern  Does not check BG  Taking medications pattern  [x] Consistent administration  [x] Affordable  Social determinants of health impacting diabetes self-management practices   Concerned that you need to know more about how to stay healthy with diabetes  Overall evaluation:    [x] Achieving A1c target with drug therapy & self-care practices    Subjective   I took my medicine yesterday.      Objective   Physical exam  General Underweight male in mild distress/ill-appearing. Conversant and cooperative  Neuro  Alert, oriented   Vital Signs Visit Vitals  BP (!) 78/65 (BP 1 Location: Right upper arm, BP Patient Position: At rest)   Pulse (!) 108   Temp 97.7 °F (36.5 °C)   Resp 20   Ht 5' 9\" (1.753 m)   Wt 56.5 kg (124 lb 9 oz)   SpO2 97%   BMI 18.39 kg/m²     Skin  Warm and dry. Acanthosis noted along neckline. No lipohypertrophy or lipoatrophy noted at injection sites   Heart   Regular rate and rhythm.  No murmurs, rubs or gallops  Lungs  Clear to auscultation without rales or rhonchi  Extremities No foot wounds        Laboratory  Recent Labs     12/23/22  0313 12/22/22  2319 12/22/22  1143   * 371* 458*   AGAP 11 12 15   WBC 18.5*  --  25.1*   CREA 2.49* 2.55* 2.63*   AST 47*  --  11*   ALT 36  --  19       Factors impacting BG management  Factor Dose Comments   Nutrition:  Standard meals     60 grams/meal Drugs:  Vasopressor load   Levophed  Affects insulin delivery     Heart Failure EF 20-30%         Other:   Kidney function GFR 27      Blood glucose pattern    Significant diabetes-related events over the past 24-72 hours  A1C  6.5% 22 (down from 7.0 10/12/22)  Fasting B  Pre-prandial: 283-290  Glipizide 5mg once this am  Lac 2.38  UA 1000+ glucosuria    Last admission was on Jardiance 10, alogliptin 12.5, 12 units Lantus, correctional humalog- experienced pre-prandial hyperglycemia. Assessment and Nursing Intervention   Nursing Diagnosis Risk for unstable blood glucose pattern   Nursing Intervention Domain 5250 Decision-making Support   Nursing Interventions Examined current inpatient diabetes/blood glucose control   Explored factors facilitating and impeding inpatient management  Explored corrective strategies with patient and responsible inpatient provider   Informed patient of rational for insulin strategy while hospitalized     Nursing Diagnosis 31020 Ineffective Health Management   Nursing Intervention Domain 5250 Decision-making Support   Nursing Interventions Identified diabetes self-management practices impeding diabetes control  Discussed diabetes survival skills related to  Healthy Plate eating plan; given handouts  Role of physical activity in improving insulin sensitivity and action  Procedure for blood glucose monitoring & options for low-cost products  Medications plan at discharge     Billing Code(s)   [x] 56955     Before making these care recommendations, I personally reviewed the hospitalization record, including notes, laboratory & diagnostic data and current medications, and examined the patient at the bedside (circumstances permitting) before making care recommendations. More than fifty (50) percent of the time was spent in patient counseling and/or care coordination.   Total minutes: 39    Ramesh Meghna, CNS  Diabetes Clinical Nurse Specialist  Program for Diabetes [facility]  Access via [facility]

## 2022-12-23 NOTE — PROGRESS NOTES
Spiritual Care Assessment/Progress Note  [facility]      NAME: Jem Dillard      MRN: [id number]  AGE: 71 y.o. SEX: male  Hoahaoism Affiliation: Spiritism   Language: English     12/23/2022     Total Time (in minutes): 19     Spiritual Assessment begun in [facility] 4 CORONARY CARE through conversation with:         [x]Patient        [] Family    [] Friend(s)        Reason for Consult: Initial visit     Spiritual beliefs: (Please include comment if needed)     [] Identifies with a lisa tradition:         [] Supported by a lisa community:            [] Claims no spiritual orientation:           [] Seeking spiritual identity:                [] Adheres to an individual form of spirituality:           [x] Not able to assess:         Asleep                   Identified resources for coping:      [] Prayer                               [] Music                  [] Guided Imagery     [] Family/friends                 [] Pet visits     [] Devotional reading                         [x] Unknown     [] Other:                                               Interventions offered during this visit: (See comments for more details)    Patient Interventions: Other (comment), Initial visit (Ministry of presence)           Plan of Care:     [x] Support spiritual and/or cultural needs    [] Support AMD and/or advance care planning process      [] Support grieving process   [] Coordinate Rites and/or Rituals    [] Coordination with community clergy   [] No spiritual needs identified at this time   [] Detailed Plan of Care below (See Comments)  [] Make referral to Music Therapy  [] Make referral to Pet Therapy     [] Make referral to Addiction services  [] Make referral to [facility]  [] Make referral to Spiritual Care Partner  [] No future visits requested        [x] Contact Spiritual Care for further referrals     Comments:  was rounding. At this time patient was sleep, no family at bedside.   did not wake patient and respected the need for rest.  Advised nurse to contact [facility] for any further referrals.     Chaplain Delmar Orourke MDiv, MAPT

## 2022-12-23 NOTE — PROGRESS NOTES
CRITICAL CARE NOTE      Name: Nikki Rojas   : 1950   MRN: [id number]   Date: 2022      Reason for ICU Admission: Cardiogenic shock     ICU PROBLEM LIST   Acute on chronic HFrEF (25-30%)  Cardiogenic shock  Lactic acidosis  NSTEMI  Acute hypoxic respiratory failure  TANNER  CAD  DM    HISTORY OF PRESENT ILLNESS:   Pt is a 71 y.o M w/ PMH as noted above who presented to ED due to progressive dyspnea. History obtained from pt and spouse at bedside. Onset 3-4 days ago, DONALDSON, abdominal discomfort and distension with associated anorexia not improved by increasing home lasix dose. States difficulty voiding despite lasix doses. Of note pt w/ recent frequent admissions for HFrEF. Denies sick contacts, CP, palpitations,     Pt tachycardic, hypotensive, hypoxemic on presentation. Labs w/ troponemia, BNP above baseline, elevated lactate, and TANNER. Imaging w/ bilateral pulmonary edema, bl pulmonary effusions, bladder distension 2/2 BPH and hydronephrosis. Pt pan cultured, given dose of abx and dose of IVP lasix. Pt placed on BiPAP w/ improvement in oxygenation. Donnelly placed w/ bladder decompression. Pt initially admitted to hospitalist service, however CCM consulted due to worsening clinical status. Admitted to CCU, started on pressor support and     24 HOUR EVENTS:   Responded well to diuresis, on O2 per NC this AM. Weaned off levo ovenight, resumed low dose this AM. POCUS eval w/ continued bl mild to mod pleural effusions, now w/ fully collapsible IVC and decompressed RV. Good UOP w/ donnelly in place.     NEUROLOGICAL:    Mentation appropriate  Monitor for acute change    PULMONOLOGY:   O2 per NC PRN to maintain spO2 >92%  Pulmonary edema improved on POCUS  Monitor bl effusions  Pt states wish to be DNI    CARDIOVASCULAR:   Responded well to lasix, now requiring gentle IVFs  Wean levo gtt as tolerated  Suspect cardiorenal syndrome 2/2 post obstructive renal failure  Lct down- trended  Holding home GDMT, will as tolerated  ASA, plavix, statin    GASTROINTESTINAL:   Cardiac, diabetic diet  Bowel regimen for constipation    RENAL/ELECTROLYTE/FLUIDS:   Strict I/Os  Suspect post obstructive failure  Urology consult for obstruction  C/w flomax  RFP, replace lytes PRN  Goal euvolemia  Avoid nephrotoxins    ENDOCRINE:   Hold PO anti-glycemic  SSI, basal PRN   Glucose goal 120-180    HEMATOLOGY/ONCOLOGY:   lovenox ppx    ID/MICRO:   Low suspicion for acute infectious process, clinical improvement w/ diuresis  PCT neg x2, Cx NGTD  Stop abx today    ICU DAILY CHECKLIST     Code Status:DNR/DNI  DVT Prophylaxis: lovenox  T/L/D: PIV, donnelly  SUP: N/A  Diet: cardiac, diabetic  Activity Level:ad jose f  ABCDEF Bundle/Checklist Completed:Yes  Disposition: Stay in ICU, possible TTF this PM or tomorrow AM once off vasopressors  Multidisciplinary Rounds Completed:yes  Patient/Family Updated: Yes    HOSPITAL COURSE/DAILY EVENT LOG   As noted above    SUBJECTIVE:   As noted above    Review of Systems:     Review of Systems   Constitutional:  Negative for chills, fever and malaise/fatigue. HENT:  Negative for sinus pain and sore throat. Eyes:  Negative for blurred vision, double vision and discharge. Respiratory:  Positive for shortness of breath. Negative for sputum production, wheezing and stridor. Cardiovascular:  Negative for chest pain, palpitations and leg swelling. Gastrointestinal:  Negative for abdominal pain, nausea and vomiting. Genitourinary:  Negative for frequency and hematuria. Musculoskeletal:  Negative for back pain, myalgias and neck pain. Skin:  Negative for itching and rash. Neurological:  Negative for dizziness, sensory change, speech change, focal weakness and headaches. Psychiatric/Behavioral:  Negative for hallucinations. The patient is not nervous/anxious.        OBJECTIVE:     Labs and Data: Reviewed 12/23/22  Medications: Reviewed 12/23/22  Imaging: Reviewed 12/23/22    Physical Exam  Vitals and nursing note reviewed. Constitutional:       General: He is not in acute distress. Appearance: Normal appearance. He is normal weight. HENT:      Head: Normocephalic and atraumatic. Nose: Nose normal. No congestion. Mouth/Throat:      Mouth: Mucous membranes are moist.      Pharynx: Oropharynx is clear. No oropharyngeal exudate. Eyes:      General: No scleral icterus. Extraocular Movements: Extraocular movements intact. Conjunctiva/sclera: Conjunctivae normal.      Pupils: Pupils are equal, round, and reactive to light. Cardiovascular:      Rate and Rhythm: Normal rate and regular rhythm. Pulses: Normal pulses. Heart sounds: No murmur heard. No friction rub. No gallop. Pulmonary:      Effort: Pulmonary effort is normal. No respiratory distress. Breath sounds: No stridor. Rales (at base, improved) present. No wheezing. Abdominal:      General: Bowel sounds are normal. There is distension. Palpations: Abdomen is soft. Tenderness: There is no abdominal tenderness. There is no guarding. Genitourinary:     Comments: donnelly  Musculoskeletal:         General: Normal range of motion. Cervical back: Normal range of motion and neck supple. No rigidity. Right lower leg: Edema present. Left lower leg: Edema present. Comments: Improved edema   Skin:     Capillary Refill: Capillary refill takes less than 2 seconds. Coloration: Skin is not jaundiced. Findings: No bruising. Neurological:      General: No focal deficit present. Mental Status: He is alert and oriented to person, place, and time. Mental status is at baseline. Cranial Nerves: No cranial nerve deficit. Psychiatric:         Mood and Affect: Mood normal.         Behavior: Behavior normal.         Thought Content:  Thought content normal.        Visit Vitals  BP 94/72   Pulse 77   Temp 97.7 °F (36.5 °C)   Resp 18   Ht 5' 9\" (1.753 m)   Wt 56.5 kg (124 lb 9 oz)   SpO2 99%   BMI 18.39 kg/m²    O2 Flow Rate (L/min): 2 l/min O2 Device: None (Room air) Temp (24hrs), Av.4 °F (36.9 °C), Min:97 °F (36.1 °C), Max:99.3 °F (37.4 °C)           Intake/Output:     Intake/Output Summary (Last 24 hours) at 2022 1002  Last data filed at 2022 0700  Gross per 24 hour   Intake 688.54 ml   Output 3250 ml   Net -2561.46 ml       Imaging    22    ECHO ADULT FOLLOW-UP OR LIMITED 2022    Interpretation Summary    Left Ventricle: Severely reduced left ventricular systolic function with a visually estimated EF of 25 - 30%. Not well visualized. Left ventricle size is normal. Normal wall thickness. Normal wall motion. Normal diastolic function. Mitral Valve: Thickened leaflet. Moderate annular calcification of the mitral valve. Mild regurgitation. Contrast used: Definity. Note: image quality is poor, and even with Definity contrast, EF is very difficult to estimate, and the reported figure is approximate at best. Consider alternative imaging modalities such as MUGA or cardiac MRI to more accurately assess. Signed by: Johanne Kehr, MD on 2022  4:46 PM           CRITICAL CARE DOCUMENTATION  I had a face to face encounter with the patient, reviewed and interpreted patient data including clinical events, labs, images, vital signs, I/O's, and examined patient. I have discussed the case and the plan and management of the patient's care with the consulting services, the bedside nurses and the respiratory therapist.      NOTE OF PERSONAL INVOLVEMENT IN CARE   This patient has a high probability of imminent, clinically significant deterioration, which requires the highest level of preparedness to intervene urgently. I participated in the decision-making and personally managed or directed the management of the following life and organ supporting interventions that required my frequent assessment to treat or prevent imminent deterioration.     I personally spent 35 minutes of critical care time. This is time spent at this critically ill patient's bedside actively involved in patient care as well as the coordination of care. This does not include any procedural time which has been billed separately. Walker Hope MD  Staff [facility]

## 2022-12-23 NOTE — PROGRESS NOTES
Verbal bedside shift change report given to Amy Lim (oncoming nurse) by Indiana Marcano (offgoing nurse). Report included the following information SBAR, Kardex, ED Summary, Intake/Output, MAR, Recent Results, Cardiac Rhythm Sinus Tachy, and Alarm Parameters.       2230 Transitioned to nasal cannula, 6 lpm  0020 Wife called for update  0410 Levo off    0730 Report to [employer]

## 2022-12-23 NOTE — NURSE NAVIGATOR
HEART FAILURE NURSE NAVIGATOR NOTE  [address]    Patient chart was reviewed by Heart Failure (HF) Nurse Navigators for compliance of prescribed treatment with guidelines directed medical therapy (GDMT) and HF database completed. Please, review beneath recommendations for symptomatic patients with HF with Reduced Ejection Fraction ? 40% (HFrEF)* and patients whose LVEF improved > 40% (HFimpEF)* for your consideration when taking care of this patient. Current Medical Therapy:    Name Albino Kendall    1950   LVEF 25/30   NYHA Functional Class IV   ARNi/ACEi/ARB Contraindicated hypotension note 22 prior admit   Beta-blocker Contraindicated hypotension note 22 prior admit   Aldosterone Antagonist Contraindicated GFR 27   SGLT2 inhibitor Jardiance   Hydralazine/Isosorbide Dinitrate    Consulting Cardiologist:      Recommendations:    Please, add the following GDMT for HFrEF ? 40% [Class 1] or document in discharge summary/progress note why patient cannot take the medication:  ARNi/ACEi or ARB  Beta-blockers (carvedilol, sustained-release metoprolol succinate or bisoprolol)  Aldosterone antagonists GFR > 30 and K< 5  SGLT2 inhibitor  Hydralazine/Isosorbide dinitrate for  Americans with Class III/IV symptoms  Adjust diuretic dose at discharge if hospitalized for volume overload    Consider adding the following GDMT for HFrEF ? 40%, if appropriate [Class 2b]:   Ivabradine for patients on maximally tolerated beta-blocker dose in order to achieve HR 70-80bpm  Digoxin, goal level 0.5-0.9  Polyunsaturated fatty acids  Vericuguat  For patient with hyperkalemia while on RAASi > 5.5, consider adding potassium binders (patiromer, sodium zirconium cycosilicate)    Note: the following medications may be potentially harmful in heart failure [Class 3]:  Calcium channel blockers (doxazosin, diltiazem, verapamil, nifedipine)  Antiarrhythmics (flecanide, disopyrimide, sotalol, dronedarone)  Diabetes medications (thiasolidinediones, saxagliptin, alogliptin)  NSAIDs and CONTRERAS 2 inhibitors    Consider vaccinations for respiratory illnesses (flu, pneumonia, covid) [Class 2b]      Patient Education:     Teach back in heart failure education provided, including information about medical therapy, lifestyle modifications, diet and fluid restrictions, physical activity. Educational resources provided: Living with Heart Failure booklet; Signs/Symptoms magnet; Weight Calendar; [facility] flyer; Preparation for Successful Discharge Checklist.  Information provided about HF support group. Heart failure avoiding triggers on discharge instructions. Plan for Transitional Care:    Post discharge follow up phone call to be made within 48-72 hours of discharge. Patient will follow-up with PCP. Patient will follow-up with Primary Cardiologist.  Obstructive sleep apnea screening done and patient was referred to Sleep Medicine. Referral/follow-up with Cardiac Rehabilitation. Referral/follow-up with Advanced Heart Failure Specialist.  Referral/follow-up with Palliative Care Specialist.      Heart Failure Nurse Navigator  Phone: [phone]  /  [phone]    *Recommendations listed above are based on 2022 AHA/ACC/HFSA Guideline for the Management of Heart Failure: A Report of the [address] on Clinical Practice Guidelines. Circulation 2022; [id number]. and 2017 AHA/ACC/HRS guideline for management of patients with ventricular arrhythmias and the prevention of sudden cardiac death: A Report of the [employer] of Cardiology/American Heart Association Task Force on Clinical Practice Guidelines and the Heart Rhythm Society.  Heart Rhythm, Vol 15, No 10, October 2018 *Class of Recommendation: Class 1 (strong), Class 2a (moderate), Class 2b (weak), Class 3 (not recommended, potentially harmful)

## 2022-12-23 NOTE — DISCHARGE INSTRUCTIONS
DISCHARGE INFORMATION    Patient: Darshana Light MRN: [id number]  SSN: [id number]    YOB: 1950  Age: 71 y.o. Sex: male                      Urinary Catheter (Vasquez)  You are being discharged with a urinary catheter. It should continuously drain any urine from the bladder and you will not urinate. The larger bag (overnight bag) allows you to drain it less often, but is less mobile. A smaller bag (leg bag) can be attached to your leg for increased mobility, and concealment. Keep the urinary drainage bag below the level of the bladder. Make sure that the urinary drainage bag does not drag and pull on the catheter. Drain it before the bag becomes full. The catheter can irritate the urethral opening, and you can place topical antibiotic (Neoporin, Double/Triple antibiotic ointment) to facilitate lubrication. The catheter can be irritating to the bladder, and it is not uncommon to have a false sense of the urge to urinate. This is worse when the catheter/tubing is not supported on the leg with some slack. If you are experience sudden onset of pain with the urge to urinate then you re having bladder spasms. Sometimes urine can leak around the catheter as the spasms pinches it off. Medications can be helpful if this is occuring frequently. Small amounts of blood are not uncommon as the catheter rubs. Note that this amount of blood can appear significant but is in reality very little. Hydration allows for dilution of the blood in the urine and avoids problems. If your catheter/tubing is becoming blocked by blood clots, or your catheter is not draining you should call your doctor immediately.

## 2022-12-24 ENCOUNTER — APPOINTMENT (OUTPATIENT)
Dept: GENERAL RADIOLOGY | Age: 72
DRG: 871 | End: 2022-12-24
Attending: STUDENT IN AN ORGANIZED HEALTH CARE EDUCATION/TRAINING PROGRAM
Payer: MEDICARE

## 2022-12-24 LAB
ALBUMIN SERPL-MCNC: 3.3 G/DL (ref 3.5–5)
ALBUMIN/GLOB SERPL: 1 (ref 1.1–2.2)
ALP SERPL-CCNC: 84 U/L (ref 45–117)
ALT SERPL-CCNC: 25 U/L (ref 12–78)
ANION GAP SERPL CALC-SCNC: 11 MMOL/L (ref 5–15)
APPEARANCE UR: ABNORMAL
AST SERPL-CCNC: 20 U/L (ref 15–37)
BACTERIA URNS QL MICRO: ABNORMAL /HPF
BASOPHILS # BLD: 0.1 K/UL (ref 0–0.1)
BASOPHILS NFR BLD: 1 % (ref 0–1)
BILIRUB SERPL-MCNC: 0.6 MG/DL (ref 0.2–1)
BILIRUB UR QL: NEGATIVE
BUN SERPL-MCNC: 60 MG/DL (ref 6–20)
BUN/CREAT SERPL: 30 (ref 12–20)
CALCIUM SERPL-MCNC: 9.9 MG/DL (ref 8.5–10.1)
CHLORIDE SERPL-SCNC: 99 MMOL/L (ref 97–108)
CO2 SERPL-SCNC: 25 MMOL/L (ref 21–32)
COLOR UR: ABNORMAL
CREAT SERPL-MCNC: 2 MG/DL (ref 0.7–1.3)
DIFFERENTIAL METHOD BLD: ABNORMAL
EOSINOPHIL # BLD: 0.1 K/UL (ref 0–0.4)
EOSINOPHIL NFR BLD: 1 % (ref 0–7)
EPITH CASTS URNS QL MICRO: ABNORMAL /LPF
ERYTHROCYTE [DISTWIDTH] IN BLOOD BY AUTOMATED COUNT: 18.3 % (ref 11.5–14.5)
GLOBULIN SER CALC-MCNC: 3.2 G/DL (ref 2–4)
GLUCOSE BLD STRIP.AUTO-MCNC: 152 MG/DL (ref 65–117)
GLUCOSE BLD STRIP.AUTO-MCNC: 222 MG/DL (ref 65–117)
GLUCOSE BLD STRIP.AUTO-MCNC: 251 MG/DL (ref 65–117)
GLUCOSE BLD STRIP.AUTO-MCNC: 283 MG/DL (ref 65–117)
GLUCOSE BLD STRIP.AUTO-MCNC: 73 MG/DL (ref 65–117)
GLUCOSE SERPL-MCNC: 157 MG/DL (ref 65–100)
GLUCOSE UR STRIP.AUTO-MCNC: 500 MG/DL
GRAN CASTS URNS QL MICRO: ABNORMAL /LPF
HCT VFR BLD AUTO: 27.2 % (ref 36.6–50.3)
HGB BLD-MCNC: 8.3 G/DL (ref 12.1–17)
HGB UR QL STRIP: ABNORMAL
HYALINE CASTS URNS QL MICRO: ABNORMAL /LPF (ref 0–5)
IMM GRANULOCYTES # BLD AUTO: 0.1 K/UL (ref 0–0.04)
IMM GRANULOCYTES NFR BLD AUTO: 0 % (ref 0–0.5)
KETONES UR QL STRIP.AUTO: NEGATIVE MG/DL
LEUKOCYTE ESTERASE UR QL STRIP.AUTO: ABNORMAL
LYMPHOCYTES # BLD: 1 K/UL (ref 0.8–3.5)
LYMPHOCYTES NFR BLD: 6 % (ref 12–49)
MAGNESIUM SERPL-MCNC: 2.7 MG/DL (ref 1.6–2.4)
MCH RBC QN AUTO: 22.9 PG (ref 26–34)
MCHC RBC AUTO-ENTMCNC: 30.5 G/DL (ref 30–36.5)
MCV RBC AUTO: 74.9 FL (ref 80–99)
MONOCYTES # BLD: 1 K/UL (ref 0–1)
MONOCYTES NFR BLD: 6 % (ref 5–13)
NEUTS SEG # BLD: 13.2 K/UL (ref 1.8–8)
NEUTS SEG NFR BLD: 86 % (ref 32–75)
NITRITE UR QL STRIP.AUTO: NEGATIVE
NRBC # BLD: 0 K/UL (ref 0–0.01)
NRBC BLD-RTO: 0 PER 100 WBC
PH UR STRIP: 5 (ref 5–8)
PHOSPHATE SERPL-MCNC: 4.5 MG/DL (ref 2.6–4.7)
PLATELET # BLD AUTO: 343 K/UL (ref 150–400)
PMV BLD AUTO: 11.2 FL (ref 8.9–12.9)
POTASSIUM SERPL-SCNC: 3.3 MMOL/L (ref 3.5–5.1)
PROCALCITONIN SERPL-MCNC: 0.67 NG/ML
PROT SERPL-MCNC: 6.5 G/DL (ref 6.4–8.2)
PROT UR STRIP-MCNC: 100 MG/DL
RBC # BLD AUTO: 3.63 M/UL (ref 4.1–5.7)
RBC #/AREA URNS HPF: ABNORMAL /HPF (ref 0–5)
SERVICE CMNT-IMP: ABNORMAL
SERVICE CMNT-IMP: NORMAL
SODIUM SERPL-SCNC: 135 MMOL/L (ref 136–145)
SP GR UR REFRACTOMETRY: 1.02 (ref 1–1.03)
UR CULT HOLD, URHOLD: NORMAL
UROBILINOGEN UR QL STRIP.AUTO: 1 EU/DL (ref 0.2–1)
WBC # BLD AUTO: 15.3 K/UL (ref 4.1–11.1)
WBC URNS QL MICRO: ABNORMAL /HPF (ref 0–4)

## 2022-12-24 PROCEDURE — 74011250636 HC RX REV CODE- 250/636: Performed by: STUDENT IN AN ORGANIZED HEALTH CARE EDUCATION/TRAINING PROGRAM

## 2022-12-24 PROCEDURE — 71045 X-RAY EXAM CHEST 1 VIEW: CPT

## 2022-12-24 PROCEDURE — 87040 BLOOD CULTURE FOR BACTERIA: CPT

## 2022-12-24 PROCEDURE — 97535 SELF CARE MNGMENT TRAINING: CPT

## 2022-12-24 PROCEDURE — 74011000258 HC RX REV CODE- 258: Performed by: STUDENT IN AN ORGANIZED HEALTH CARE EDUCATION/TRAINING PROGRAM

## 2022-12-24 PROCEDURE — 85025 COMPLETE CBC W/AUTO DIFF WBC: CPT

## 2022-12-24 PROCEDURE — 97161 PT EVAL LOW COMPLEX 20 MIN: CPT

## 2022-12-24 PROCEDURE — 36415 COLL VENOUS BLD VENIPUNCTURE: CPT

## 2022-12-24 PROCEDURE — 77030005513 HC CATH URETH FOL11 MDII -B

## 2022-12-24 PROCEDURE — 81001 URINALYSIS AUTO W/SCOPE: CPT

## 2022-12-24 PROCEDURE — 84145 PROCALCITONIN (PCT): CPT

## 2022-12-24 PROCEDURE — 80053 COMPREHEN METABOLIC PANEL: CPT

## 2022-12-24 PROCEDURE — 97530 THERAPEUTIC ACTIVITIES: CPT

## 2022-12-24 PROCEDURE — 02HV33Z INSERTION OF INFUSION DEVICE INTO SUPERIOR VENA CAVA, PERCUTANEOUS APPROACH: ICD-10-PCS | Performed by: STUDENT IN AN ORGANIZED HEALTH CARE EDUCATION/TRAINING PROGRAM

## 2022-12-24 PROCEDURE — 74011250637 HC RX REV CODE- 250/637: Performed by: STUDENT IN AN ORGANIZED HEALTH CARE EDUCATION/TRAINING PROGRAM

## 2022-12-24 PROCEDURE — 74011636637 HC RX REV CODE- 636/637: Performed by: STUDENT IN AN ORGANIZED HEALTH CARE EDUCATION/TRAINING PROGRAM

## 2022-12-24 PROCEDURE — 74011250637 HC RX REV CODE- 250/637: Performed by: FAMILY MEDICINE

## 2022-12-24 PROCEDURE — P9047 ALBUMIN (HUMAN), 25%, 50ML: HCPCS | Performed by: STUDENT IN AN ORGANIZED HEALTH CARE EDUCATION/TRAINING PROGRAM

## 2022-12-24 PROCEDURE — 74011000250 HC RX REV CODE- 250: Performed by: STUDENT IN AN ORGANIZED HEALTH CARE EDUCATION/TRAINING PROGRAM

## 2022-12-24 PROCEDURE — 84100 ASSAY OF PHOSPHORUS: CPT

## 2022-12-24 PROCEDURE — 97165 OT EVAL LOW COMPLEX 30 MIN: CPT

## 2022-12-24 PROCEDURE — 83735 ASSAY OF MAGNESIUM: CPT

## 2022-12-24 PROCEDURE — 77030013797 HC KT TRNSDUC PRSSR EDWD -A

## 2022-12-24 PROCEDURE — 65620000000 HC RM CCU GENERAL

## 2022-12-24 PROCEDURE — 82962 GLUCOSE BLOOD TEST: CPT

## 2022-12-24 PROCEDURE — 74011250637 HC RX REV CODE- 250/637: Performed by: INTERNAL MEDICINE

## 2022-12-24 RX ORDER — ALBUMIN HUMAN 250 G/1000ML
25 SOLUTION INTRAVENOUS ONCE
Status: DISCONTINUED | OUTPATIENT
Start: 2022-12-24 | End: 2022-12-24

## 2022-12-24 RX ORDER — QUETIAPINE FUMARATE 100 MG/1
100 TABLET, FILM COATED ORAL
Status: DISCONTINUED | OUTPATIENT
Start: 2022-12-24 | End: 2022-12-25

## 2022-12-24 RX ORDER — INSULIN GLARGINE 100 [IU]/ML
8 INJECTION, SOLUTION SUBCUTANEOUS DAILY
Status: DISCONTINUED | OUTPATIENT
Start: 2022-12-25 | End: 2022-12-27

## 2022-12-24 RX ORDER — POTASSIUM CHLORIDE 750 MG/1
40 TABLET, FILM COATED, EXTENDED RELEASE ORAL ONCE
Status: COMPLETED | OUTPATIENT
Start: 2022-12-24 | End: 2022-12-24

## 2022-12-24 RX ORDER — ALBUMIN HUMAN 250 G/1000ML
25 SOLUTION INTRAVENOUS EVERY 6 HOURS
Status: COMPLETED | OUTPATIENT
Start: 2022-12-24 | End: 2022-12-25

## 2022-12-24 RX ORDER — BACITRACIN 500 UNIT/G
1 PACKET (EA) TOPICAL AS NEEDED
Status: DISCONTINUED | OUTPATIENT
Start: 2022-12-24 | End: 2023-01-19 | Stop reason: HOSPADM

## 2022-12-24 RX ORDER — ALBUMIN HUMAN 250 G/1000ML
12.5 SOLUTION INTRAVENOUS ONCE
Status: COMPLETED | OUTPATIENT
Start: 2022-12-24 | End: 2022-12-25

## 2022-12-24 RX ORDER — LANOLIN ALCOHOL/MO/W.PET/CERES
5 CREAM (GRAM) TOPICAL AS NEEDED
Status: DISCONTINUED | OUTPATIENT
Start: 2022-12-24 | End: 2022-12-24

## 2022-12-24 RX ORDER — NOREPINEPHRINE BITARTRATE/D5W 8 MG/250ML
.5-3 PLASTIC BAG, INJECTION (ML) INTRAVENOUS
Status: DISCONTINUED | OUTPATIENT
Start: 2022-12-24 | End: 2022-12-31

## 2022-12-24 RX ORDER — QUETIAPINE FUMARATE 100 MG/1
100 TABLET, FILM COATED ORAL ONCE
Status: COMPLETED | OUTPATIENT
Start: 2022-12-24 | End: 2022-12-24

## 2022-12-24 RX ADMIN — SODIUM CHLORIDE, PRESERVATIVE FREE 10 ML: 5 INJECTION INTRAVENOUS at 21:32

## 2022-12-24 RX ADMIN — INSULIN GLARGINE 12 UNITS: 100 INJECTION, SOLUTION SUBCUTANEOUS at 09:13

## 2022-12-24 RX ADMIN — PIPERACILLIN AND TAZOBACTAM 3.38 G: 3; .375 INJECTION, POWDER, FOR SOLUTION INTRAVENOUS at 23:40

## 2022-12-24 RX ADMIN — TAMSULOSIN HYDROCHLORIDE 0.4 MG: 0.4 CAPSULE ORAL at 09:14

## 2022-12-24 RX ADMIN — ASPIRIN 81 MG: 81 TABLET, COATED ORAL at 09:14

## 2022-12-24 RX ADMIN — Medication 4.5 MG: at 01:44

## 2022-12-24 RX ADMIN — Medication 5 UNITS: at 09:13

## 2022-12-24 RX ADMIN — ALBUMIN (HUMAN) 12.5 G: 0.25 INJECTION, SOLUTION INTRAVENOUS at 07:07

## 2022-12-24 RX ADMIN — QUETIAPINE FUMARATE 100 MG: 100 TABLET ORAL at 22:07

## 2022-12-24 RX ADMIN — ALBUMIN (HUMAN) 25 G: 0.25 INJECTION, SOLUTION INTRAVENOUS at 18:26

## 2022-12-24 RX ADMIN — SODIUM CHLORIDE, PRESERVATIVE FREE 10 ML: 5 INJECTION INTRAVENOUS at 15:22

## 2022-12-24 RX ADMIN — IPRATROPIUM BROMIDE 2 SPRAY: 21 SPRAY, METERED NASAL at 21:31

## 2022-12-24 RX ADMIN — ROSUVASTATIN 10 MG: 10 TABLET, FILM COATED ORAL at 21:26

## 2022-12-24 RX ADMIN — Medication 3 UNITS: at 12:01

## 2022-12-24 RX ADMIN — POTASSIUM CHLORIDE 40 MEQ: 750 TABLET, FILM COATED, EXTENDED RELEASE ORAL at 09:14

## 2022-12-24 RX ADMIN — Medication 2 UNITS: at 21:26

## 2022-12-24 RX ADMIN — SODIUM CHLORIDE, PRESERVATIVE FREE 10 ML: 5 INJECTION INTRAVENOUS at 21:31

## 2022-12-24 RX ADMIN — ACETAMINOPHEN 650 MG: 325 TABLET ORAL at 11:02

## 2022-12-24 RX ADMIN — ENOXAPARIN SODIUM 30 MG: 100 INJECTION SUBCUTANEOUS at 15:14

## 2022-12-24 RX ADMIN — IPRATROPIUM BROMIDE 2 SPRAY: 21 SPRAY, METERED NASAL at 10:49

## 2022-12-24 RX ADMIN — SODIUM CHLORIDE, POTASSIUM CHLORIDE, SODIUM LACTATE AND CALCIUM CHLORIDE 500 ML: 600; 310; 30; 20 INJECTION, SOLUTION INTRAVENOUS at 15:42

## 2022-12-24 RX ADMIN — SODIUM CHLORIDE, POTASSIUM CHLORIDE, SODIUM LACTATE AND CALCIUM CHLORIDE 500 ML: 600; 310; 30; 20 INJECTION, SOLUTION INTRAVENOUS at 07:07

## 2022-12-24 RX ADMIN — Medication 5 UNITS: at 12:00

## 2022-12-24 RX ADMIN — ALBUMIN (HUMAN) 25 G: 0.25 INJECTION, SOLUTION INTRAVENOUS at 15:13

## 2022-12-24 RX ADMIN — ONDANSETRON HYDROCHLORIDE 4 MG: 2 INJECTION, SOLUTION INTRAMUSCULAR; INTRAVENOUS at 08:49

## 2022-12-24 RX ADMIN — Medication 3 UNITS: at 18:50

## 2022-12-24 RX ADMIN — PIPERACILLIN AND TAZOBACTAM 4.5 G: 4; .5 INJECTION, POWDER, FOR SOLUTION INTRAVENOUS at 18:18

## 2022-12-24 RX ADMIN — CLOPIDOGREL BISULFATE 75 MG: 75 TABLET ORAL at 09:14

## 2022-12-24 NOTE — PROGRESS NOTES
CRITICAL CARE NOTE      Name: Tobias Campos   : 1950   MRN: [id number]   Date: 2022      Reason for ICU Admission: Cardiogenic shock     ICU PROBLEM LIST   Acute on chronic HFrEF (25-30%)  Cardiogenic shock  Lactic acidosis  NSTEMI  Acute hypoxic respiratory failure  TANNER  CAD  DM    HISTORY OF PRESENT ILLNESS:   Pt is a 71 y.o M w/ PMH as noted above who presented to ED due to progressive dyspnea. History obtained from pt and spouse at bedside. Onset 3-4 days ago, DONALDSON, abdominal discomfort and distension with associated anorexia not improved by increasing home lasix dose. States difficulty voiding despite lasix doses. Of note pt w/ recent frequent admissions for HFrEF. Denies sick contacts, CP, palpitations,     Pt tachycardic, hypotensive, hypoxemic on presentation. Labs w/ troponemia, BNP above baseline, elevated lactate, and TANNER. Imaging w/ bilateral pulmonary edema, bl pulmonary effusions, bladder distension 2/2 BPH and hydronephrosis. Pt pan cultured, given dose of abx and dose of IVP lasix. Pt placed on BiPAP w/ improvement in oxygenation. Vasquez placed w/ bladder decompression. Pt initially admitted to hospitalist service, however CCM consulted due to worsening clinical status. Admitted to CCU, started on pressor support and     24 HOUR EVENTS:   Remains on low dose levo, self diuresing w/o repeat lasix dose. IVFs to euvolemia. Appears more comfortable, tolerating diet.      NEUROLOGICAL:    Mentation appropriate  Monitor for acute change    PULMONOLOGY:   O2 per NC PRN to maintain spO2 >92%  Monitor bl effusions  Pt states wish to be DNI    CARDIOVASCULAR:   Responded well to lasix, now requiring gentle IVFs  Wean levo gtt as tolerated  Suspect cardiorenal syndrome 2/2 post obstructive renal failure  Lct down- trended  Holding home GDMT, will as tolerated  ASA, plavix, statin    GASTROINTESTINAL:   Cardiac, diabetic diet  Bowel regimen for constipation    RENAL/ELECTROLYTE/FLUIDS:   Strict I/Os  Suspect post obstructive failure, Cr now improving and w/ post TANNER polyuria  Urology consult for obstruction, donnelly to remain in place for at least 7-10 days  C/w flomax  RFP, replace lytes PRN  Goal euvolemia  Avoid nephrotoxins    ENDOCRINE:   Hold PO anti-glycemic  SSI, basal PRN   Glucose goal 120-180    HEMATOLOGY/ONCOLOGY:   lovenox ppx    ID/MICRO:   Low suspicion for acute infectious process, clinical improvement w/ diuresis  PCT neg x2, Cx NGTD  Off abx    ICU DAILY CHECKLIST     Code Status:DNR/DNI  DVT Prophylaxis: lovenox  T/L/D: PIV, donnelly  SUP: N/A  Diet: cardiac, diabetic  Activity Level:ad jose f  ABCDEF Bundle/Checklist Completed:Yes  Disposition: Stay in ICU, possible TTF this PM or tomorrow AM once off vasopressors  Multidisciplinary Rounds Completed:yes  Patient/Family Updated: Yes    HOSPITAL COURSE/DAILY EVENT LOG   As noted above    SUBJECTIVE:   As noted above    Review of Systems:     Review of Systems   Constitutional:  Negative for chills, fever and malaise/fatigue. HENT:  Negative for sinus pain and sore throat. Eyes:  Negative for blurred vision, double vision and discharge. Respiratory:  Negative for sputum production, wheezing and stridor. Cardiovascular:  Negative for chest pain, palpitations and leg swelling. Gastrointestinal:  Negative for abdominal pain, nausea and vomiting. Genitourinary:  Negative for frequency and hematuria. Musculoskeletal:  Negative for back pain, myalgias and neck pain. Skin:  Negative for itching and rash. Neurological:  Negative for dizziness, sensory change, speech change, focal weakness and headaches. Psychiatric/Behavioral:  Negative for hallucinations. The patient is not nervous/anxious. OBJECTIVE:     Labs and Data: Reviewed 12/24/22  Medications: Reviewed 12/24/22  Imaging: Reviewed 12/24/22    Physical Exam  Vitals and nursing note reviewed. Constitutional:       General: He is not in acute distress.      Appearance: Normal appearance. He is normal weight. HENT:      Head: Normocephalic and atraumatic. Nose: Nose normal. No congestion. Mouth/Throat:      Mouth: Mucous membranes are moist.      Pharynx: Oropharynx is clear. No oropharyngeal exudate. Eyes:      General: No scleral icterus. Extraocular Movements: Extraocular movements intact. Conjunctiva/sclera: Conjunctivae normal.      Pupils: Pupils are equal, round, and reactive to light. Cardiovascular:      Rate and Rhythm: Normal rate and regular rhythm. Pulses: Normal pulses. Heart sounds: No murmur heard. No friction rub. No gallop. Pulmonary:      Effort: Pulmonary effort is normal. No respiratory distress. Breath sounds: No stridor. No wheezing or rales. Abdominal:      General: Bowel sounds are normal. There is distension. Palpations: Abdomen is soft. Tenderness: There is no abdominal tenderness. There is no guarding. Genitourinary:     Comments: donnelly  Musculoskeletal:         General: Normal range of motion. Cervical back: Normal range of motion and neck supple. No rigidity. Right lower leg: No edema. Left lower leg: No edema. Comments: Improved edema   Skin:     Capillary Refill: Capillary refill takes less than 2 seconds. Coloration: Skin is not jaundiced. Findings: No bruising. Neurological:      General: No focal deficit present. Mental Status: He is alert and oriented to person, place, and time. Mental status is at baseline. Cranial Nerves: No cranial nerve deficit. Psychiatric:         Mood and Affect: Mood normal.         Behavior: Behavior normal.         Thought Content:  Thought content normal.        Visit Vitals  BP (!) 110/54 (BP 1 Location: Left upper arm, BP Patient Position: Standing)   Pulse (!) 109   Temp 98.2 °F (36.8 °C)   Resp 15   Ht 5' 9\" (1.753 m)   Wt 56.4 kg (124 lb 5.4 oz)   SpO2 100%   BMI 18.36 kg/m²    O2 Flow Rate (L/min): 2 l/min O2 Device: None (Room air) Temp (24hrs), Av °F (36.7 °C), Min:97.9 °F (36.6 °C), Max:98.2 °F (36.8 °C)           Intake/Output:     Intake/Output Summary (Last 24 hours) at 2022 0913  Last data filed at 2022 0600  Gross per 24 hour   Intake 1172.93 ml   Output 2571 ml   Net -1398.07 ml         Imaging    22    ECHO ADULT FOLLOW-UP OR LIMITED 2022    Interpretation Summary    Left Ventricle: Severely reduced left ventricular systolic function with a visually estimated EF of 25 - 30%. Not well visualized. Left ventricle size is normal. Normal wall thickness. Normal wall motion. Normal diastolic function. Mitral Valve: Thickened leaflet. Moderate annular calcification of the mitral valve. Mild regurgitation. Contrast used: Definity. Note: image quality is poor, and even with Definity contrast, EF is very difficult to estimate, and the reported figure is approximate at best. Consider alternative imaging modalities such as MUGA or cardiac MRI to more accurately assess. Signed by: Chino Jean Baptiste MD on 2022  4:46 PM           CRITICAL CARE DOCUMENTATION  I had a face to face encounter with the patient, reviewed and interpreted patient data including clinical events, labs, images, vital signs, I/O's, and examined patient. I have discussed the case and the plan and management of the patient's care with the consulting services, the bedside nurses and the respiratory therapist.      NOTE OF PERSONAL INVOLVEMENT IN CARE   This patient has a high probability of imminent, clinically significant deterioration, which requires the highest level of preparedness to intervene urgently. I participated in the decision-making and personally managed or directed the management of the following life and organ supporting interventions that required my frequent assessment to treat or prevent imminent deterioration. I personally spent 35 minutes of critical care time.   This is time spent at this critically ill patient's bedside actively involved in patient care as well as the coordination of care. This does not include any procedural time which has been billed separately. Walker Beyer MD  Staff [facility]

## 2022-12-24 NOTE — PROCEDURES
Procedure Note - Central Venous Access:   Performed by Ruben Dutta MD  Diagnosis: acute on chronic HFrEF  Insertion Date: 12/24/22  Time:5:48 PM  Obtained Consent? yes; informed   Procedure Location:  CCU. Immediately prior to the procedure, the patient was reevaluated and found suitable for the planned procedure and any planned medications. Immediately prior to the procedure a time out was called to verify the correct patient, procedure, equipment, staff, and marking as appropriate. Central line Bundle:  Full sterile barrier precautions used. 7-Step Sterility Protocol followed. (cap, mask sterile gown, sterile gloves, large sterile sheet, hand hygiene, 2% chlorhexidine for cutaneous antisepsis)  5 mL 1% Lidocaine placed at insertion site. Patient positioned in Trendelenburg?yes   The site was prepped with ChloraPrep and Sterile draping. Catheter inserted into a new site. Using Seldinger technique a Arrow Triple Lumen CVC was placed in the Right, Internal Jugular Vein via direct cannulation with 1 number of attempts for Monitoring, Blood Drawing, and IV Access. Ultrasound Guidance was utilized. There was good dark, non-pulsatile blood return in all ports. Femoral Site? no.   Catheter secured. Biopatch/CHG bio-occlusive dressing in place? yes. The following complications were encountered: None. A follow-up chest x-ray was ordered post procedure. The procedure was tolerated well.     EBL minimal    [address] [facility]

## 2022-12-24 NOTE — PROGRESS NOTES
Problem: Mobility Impaired (Adult and Pediatric)  Goal: *Therapy Goal (Edit Goal, Insert Text)  Description: FUNCTIONAL STATUS PRIOR TO ADMISSION: Patient was independent and active without use of DME. Patient is currently driving. Patient is independent with ADLs and IADLs. HOME SUPPORT PRIOR TO ADMISSION: The patient lived with his wife, but did not require assist.    Physical Therapy Goals  Initiated 12/24/2022  1. Patient will move from supine to sit and sit to supine, scoot up and down, and roll side to side in bed with modified independence within 7 day(s). 2.  Patient will transfer from bed to chair and chair to bed with modified independence using the least restrictive device within 7 day(s). 3.  Patient will perform sit to stand with modified independence within 7 day(s). 4.  Patient will ambulate with modified independence for 150 feet with the least restrictive device within 7 day(s). 5.  Patient will ascend/descend 13 stairs with single handrail(s) with modified independence within 7 day(s). Outcome: Not Met    PHYSICAL THERAPY EVALUATION  Patient: Nikki Rojas (76 y.o. male)  Date: 12/24/2022  Primary Diagnosis: [facility] ([facility] [A41.9]       Precautions:  DNR, DNI, Fall    ASSESSMENT  Based on the objective data described below, the patient presents with decreased cardiorespiratory tolerance, generalized weakness, impaired dynamic standing balance, decreased activity tolerance secondary to dyspnea and SOB, and impaired gait tolerance. Patient's SpO2 fluctuating between 89% and 95% on room air with activity (static standing, bed > BSC transfer, and stand-step pivot transfer). Patient required overall supervision to modified independence for all functional activities performed this date. Patient responded positively to pursed-lip breathing instruction and diaphragmatic breathing instruction.  Unable to progress gait training this date secondary to patient's fatigue level following attempted bowel movement and to patient's stationary IV pole. Current Level of Function Impacting Discharge (mobility/balance): Supervision level bed mobility, Contact guard functional transfers    Functional Outcome Measure: The patient scored 70/100 on the Barthel Index outcome measure which is indicative of a 30% impaired-ability to independently perform ADLs and functional tasks. Other factors to consider for discharge: 13 steps up to bedroom, disabled wife     Patient will benefit from skilled therapy intervention to address the above noted impairments. PLAN :  Recommendations and Planned Interventions: bed mobility training, transfer training, gait training, therapeutic exercises, patient and family training/education, and therapeutic activities      Frequency/Duration: Patient will be followed by physical therapy:  5 times a week to address goals. Recommendation for discharge: (in order for the patient to meet his/her long term goals)  Physical therapy at least 2 days/week in the home AND ensure assist and/or supervision for safety with community reintegration    This discharge recommendation:  Has not yet been discussed the attending provider and/or case management    IF patient discharges home will need the following DME: to be determined (TBD)         SUBJECTIVE:   Patient stated I just feel weak.     OBJECTIVE DATA SUMMARY:   HISTORY:    Past Medical History:   Diagnosis Date    CAD (coronary artery disease) 11/10/2016    NSTEMI & 2 stents    Deafness 10/28/2012    DM (diabetes mellitus) ([facility]     Elevated cholesterol     Hypertension     NSTEMI (non-ST elevated myocardial infarction) ([facility] 11/10/2016     Past Surgical History:   Procedure Laterality Date    COLONOSCOPY N/A 6/28/2018    COLONOSCOPY performed by Jonathan Crouch MD at [address] [place] [address] St  11/11/2016    2 stents       Personal factors and/or comorbidities impacting plan of care: PMH    Home Situation  Home Environment: Private residence  # Steps to Enter: 5  Rails to Enter: Yes  Hand Rails : Left  Wheelchair Ramp: No  One/Two Story Residence: Two story  # of Interior Steps: 15  Interior Rails: Left  Living Alone: No  Support Systems: Spouse/Significant Other  Patient Expects to be Discharged to[de-identified] Home with home health  Current DME Used/Available at Home: None  Tub or Shower Type: Shower    EXAMINATION/PRESENTATION/DECISION MAKING:   Critical Behavior:  Neurologic State: Alert  Orientation Level: Oriented X4  Cognition: Appropriate decision making     Hearing: Auditory  Auditory Impairment: Hard of hearing, bilateral  Skin:  Intact  Edema: Abdominal distention   Range Of Motion:  AROM: Within functional limits                       Strength:    Strength: Generally decreased, functional                    Tone & Sensation:   Tone: Normal              Sensation: Intact               Coordination:  Coordination: Within functional limits    Functional Mobility:  Bed Mobility:  Rolling: Supervision  Supine to Sit: Supervision  Sit to Supine: Supervision     Transfers:  Sit to Stand: Contact guard assistance  Stand to Sit: Contact guard assistance                       Balance:   Sitting: Intact; Without support  Standing: Impaired; Without support  Standing - Static: Good  Standing - Dynamic : Fair;Occasional    Therapeutic Exercises:   Seated marches x 10  Standing marches x 10    Functional Measure:  Barthel Index:    Bathin  Bladder: 10  Bowels: 5  Groomin  Dressing: 10  Feeding: 10  Mobility: 10  Stairs: 5  Toilet Use: 5  Transfer (Bed to Chair and Back): 10  Total: 70/100       The Barthel ADL Index: Guidelines  1. The index should be used as a record of what a patient does, not as a record of what a patient could do. 2. The main aim is to establish degree of independence from any help, physical or verbal, however minor and for whatever reason.   3. The need for supervision renders the patient not independent. 4. A patient's performance should be established using the best available evidence. Asking the patient, friends/relatives and nurses are the usual sources, but direct observation and common sense are also important. However direct testing is not needed. 5. Usually the patient's performance over the preceding 24-48 hours is important, but occasionally longer periods will be relevant. 6. Middle categories imply that the patient supplies over 50 per cent of the effort. 7. Use of aids to be independent is allowed. Score Interpretation (from [address] [address])    Independent   60-79 Minimally independent   40-59 Partially dependent   20-39 Very dependent   <20 Totally dependent     -Tomas Page., Barthel, DNATANAEL. (1965). Functional evaluation: the Barthel Index. [address] [facility] ([address] (1997). The Barthel activities of daily living index: self-reporting versus actual performance in the old (> or = 75 years). Journal [address] 45(7), 14 [facility], JOSUE, Mere Osorio., Margurette Roller. (1999). Measuring the change in disability after inpatient rehabilitation; comparison of the responsiveness of the Barthel Index and Functional [place] Measure. Journal of Neurology, Neurosurgery, and Psychiatry, 66(4), 239-224. Chandan Rosas, N.J.A, PIPE Guzman, & Aravind Hamilton M.A. (2004) Assessment of post-stroke quality of life in cost-effectiveness studies: The usefulness of the Barthel Index and the EuroQoL-5D.  Quality of Life Research, 13, 734-07          Based on the above components, the patient evaluation is determined to be of the following complexity level: LOW     Pain Rating:  Patient reported rectal discomfort with attempted bowel movement     Activity Tolerance:   Fair, requires rest breaks, and observed SOB with activity    After treatment patient left in no apparent distress:   Supine in bed, Call bell within reach, and Side rails x 3    COMMUNICATION/EDUCATION:   The patients plan of care was discussed with: Registered nurse. Fall prevention education was provided and the patient/caregiver indicated understanding., Patient/family have participated as able in goal setting and plan of care. , and Patient/family agree to work toward stated goals and plan of care.     Thank you for this referral.  Izabella Forrest, PT, DPT   Time Calculation: 33 mins

## 2022-12-24 NOTE — PROGRESS NOTES
1145 Pt afebrile after tylenol. Crackles in bilateral lung bases, given IS and educated on use    1230 Pt cleaned of stool. Up to chair, BP dropped, sbp 70s sitting and supine. Levo increased. O2 sats 89%, placed on 3LNC. MD made aware.     [address] [address] on , give sliding scale humalog per Lena Hoffman MD

## 2022-12-24 NOTE — PROGRESS NOTES
0730  Bedside report received from [address] [place] [address]. Tereza Garcia RN  Levo @4     0930 PT working with patient    1050 T 101.3    1100 Levo @3 per Lena Hoffman MD

## 2022-12-24 NOTE — PROGRESS NOTES
1930: Bedside shift change report given to Beti RN (oncoming nurse) by Cameron Mclaughlin RN (offgoing nurse). Report included the following information SBAR, Intake/Output, MAR, and Recent Results. Drips: levo 4    0400: Labs obtained and sent. 0730: Shift report given to [facility].

## 2022-12-24 NOTE — PROGRESS NOTES
Problem: Self Care Deficits Care Plan (Adult)  Goal: *Acute Goals and Plan of Care (Insert Text)  Description: FUNCTIONAL STATUS PRIOR TO ADMISSION: Patient was independent and active without use of DME. 2nd readmission in past month. Was completing ADLs and IADls without difficulty, went home on RA. Reports having difficulty doing stairs 2/2 increased SOB resulting in readmission. HOME SUPPORT: The patient lived with spouse but did not require assist.    Occupational Therapy Goals  Initiated 12/24/2022  1. Patient will perform upper body dressing with supervision/set-up within 7 day(s). 2.  Patient will perform lower body dressing with supervision/set-up within 7 day(s). 3.  Patient will perform bathing with supervision/set-up within 7 day(s). 4.  Patient will perform toilet transfers with supervision/set-up within 7 day(s). 5.  Patient will perform all aspects of toileting with supervision/set-up within 7 day(s). 6.  Patient will participate in upper extremity therapeutic exercise/activities with supervision/set-up for 5 minutes within 7 day(s). 7.  Patient will utilize energy conservation techniques during functional activities with verbal cues within 7 day(s). Outcome: Progressing Towards Goal   OCCUPATIONAL THERAPY EVALUATION  Patient: Tana Hosteller (76 y.o. male)  Date: 12/24/2022  Primary Diagnosis: [facility]is [facility] [A41.9]       Precautions:   DNR, DNI, Fall, goes by \"Elmer\"    ASSESSMENT  Based on the objective data described below, the patient presents with SOB, decreased endurance, impaired balance, and generalized weakness following readmission for dyspnea and SOB. Patient had recent admission and had L head cath earlier this month, was functioning at a SPV - INDP level with Adls and mobility. On this date patient agreeable to OT session, received supine in bed on RA cleared for activity by RN. Session limited by stationary/immobile IV pole and short IV line length.  Patient with bowel incontinence during session, able to transition to EOB to engage in perineal bathing/hygiene. Patient requiring up to mod A to complete, SpO2 remaining >92% on RA with activity and vitals stable (see below). Patient with increased fatigue following ADLs, returned to supine in bed and GUILLORY with breakfast tray. Patient voicing concern with returning home as he is fearful to have another readmission, reporting difficulty with managing stairs and ADLs following last DC home. Current Level of Function Impacting Discharge (ADLs/self-care):   Feeding: Independent    Oral Facial Hygiene/Grooming: Independent    Bathing: Minimum assistance    Upper Body Dressing: Independent    Lower Body Dressing: Minimum assistance    Toileting: Moderate assistance    Functional Outcome Measure: The patient scored Total: 70/100 on the Barthel Index outcome measure which is indicative of being minimally independent in basic self-care. Other factors to consider for discharge: below baseline, readmission      Patient will benefit from skilled therapy intervention to address the above noted impairments. PLAN :  Recommendations and Planned Interventions: self care training, functional mobility training, therapeutic exercise, balance training, therapeutic activities, endurance activities, patient education, home safety training, and family training/education    Frequency/Duration: Patient will be followed by occupational therapy 5 times a week to address goals.     Recommendation for discharge: (in order for the patient to meet his/her long term goals)  Therapy up to 5 days/week in SNF setting vs  pending progress     This discharge recommendation:  Has been made in collaboration with the attending provider and/or case management    IF patient discharges home will need the following DME: shower chair and walker: rollator (for energy conservation)       SUBJECTIVE:   Patient stated I just don't want to end up back here, I think I need to get stronger.     OBJECTIVE DATA SUMMARY:   HISTORY:   Past Medical History:   Diagnosis Date    CAD (coronary artery disease) 11/10/2016    NSTEMI & 2 stents    Deafness 10/28/2012    DM (diabetes mellitus) ([facility]     Elevated cholesterol     Hypertension     NSTEMI (non-ST elevated myocardial infarction) ([facility] 11/10/2016     Past Surgical History:   Procedure Laterality Date    COLONOSCOPY N/A 6/28/2018    COLONOSCOPY performed by Jose Valdes MD at [address]  11/11/2016    2 stents       Expanded or extensive additional review of patient history:     Home Situation  Home Environment: Private residence  # Steps to Enter: 5  Rails to Enter: Yes  Hand Rails : Left  Wheelchair Ramp: No  One/Two Story Residence: Two story  # of Interior Steps: 15  Interior Rails: Left  Living Alone: No  Support Systems: Spouse/Significant Other  Patient Expects to be Discharged to[de-identified] Home with home health  Current DME Used/Available at Home: None  Tub or Shower Type: Shower        EXAMINATION OF PERFORMANCE DEFICITS:  Cognitive/Behavioral Status:  Neurologic State: Alert  Orientation Level: Oriented X4  Cognition: Appropriate decision making  Perception: Appears intact  Perseveration: No perseveration noted  Safety/Judgement: Awareness of environment      Vitals:      12/24/22 0804 12/24/22 0813   Vital Signs   Pulse (Heart Rate) (!) 101 (!) 109   Heart Rate Source Monitor Monitor   BP (!) 100/56 (!) 110/54   MAP (Calculated) 71 73   BP 1 Location Left upper arm Left upper arm   BP 1 Method Automatic Automatic   BP Patient Position Sitting Standing   O2 Sat (%) 97 % 100 %   O2 Device None (Room air) None (Room air)     Hearing:   Auditory  Auditory Impairment: Hard of hearing, bilateral    Vision/Perceptual:                 Acuity: Within Defined Limits         Range of Motion:    AROM: Within functional limits      Strength:    Strength: Generally decreased, functional     Coordination:  Coordination: Within functional limits  Fine Motor Skills-Upper: Left Intact; Right Intact         Tone & Sensation:    Tone: Normal  Sensation: Intact     Balance:  Sitting: Intact; Without support  Standing: Impaired; Without support  Standing - Static: Good  Standing - Dynamic : Fair;Occasional    Functional Mobility and Transfers for ADLs:  Bed Mobility:  Rolling: Supervision  Supine to Sit: Supervision  Sit to Supine: Supervision    Transfers:  Sit to Stand: Contact guard assistance  Stand to Sit: Contact guard assistance    ADL Assessment:  Feeding: Independent    Oral Facial Hygiene/Grooming: Independent    Bathing: Minimum assistance      Upper Body Dressing: Independent    Lower Body Dressing: Minimum assistance    Toileting: Moderate assistance       ADL Intervention and task modifications:       Grooming  Washing Hands: Set-up; Supervision    Upper Body Bathing  Bathing Assistance: Set-up; Supervision      Lower Body Bathing  Perineal  : Moderate assistance  Lower Body : Minimum assistance    Upper Body [address]: Supervision    Lower Body Dressing Assistance  Socks: Supervision    Toileting  Toileting Assistance: Moderate assistance  Bowel Hygiene: Moderate assistance  Clothing Management: Moderate assistance    Cognitive Retraining  Safety/Judgement: Awareness of environment      Functional Measure:    Barthel Index:  Bathin  Bladder: 10  Bowels: 5  Groomin  Dressing: 10  Feeding: 10  Mobility: 10  Stairs: 5  Toilet Use: 5  Transfer (Bed to Chair and Back): 10  Total: 70/100      The Barthel ADL Index: Guidelines  1. The index should be used as a record of what a patient does, not as a record of what a patient could do. 2. The main aim is to establish degree of independence from any help, physical or verbal, however minor and for whatever reason. 3. The need for supervision renders the patient not independent.   4. A patient's performance should be established using the best available evidence. Asking the patient, friends/relatives and nurses are the usual sources, but direct observation and common sense are also important. However direct testing is not needed. 5. Usually the patient's performance over the preceding 24-48 hours is important, but occasionally longer periods will be relevant. 6. Middle categories imply that the patient supplies over 50 per cent of the effort. 7. Use of aids to be independent is allowed. Score Interpretation (from [address] [facility])    Independent   60-79 Minimally independent   40-59 Partially dependent   20-39 Very dependent   <20 Totally dependent     -Keri Page, Barthel, D.W. (1965). Functional evaluation: the Barthel Index. [address] ([address] (1997). The Barthel activities of daily living index: self-reporting versus actual performance in the old (> or = 75 years). Journal of [address] [place] [address] 45(7), 14 [facility], JOSUE, Kelly Auguste., Alexandre Barron. (1999). Measuring the change in disability after inpatient rehabilitation; comparison of the responsiveness of the Barthel Index and Functional [place] Measure. Journal of Neurology, Neurosurgery, and Psychiatry, 66(4), 619-915. Cristobal Khan, N.J.A, PIPE Guzman, & Marv Wilkinson, MJustinoA. (2004) Assessment of post-stroke quality of life in cost-effectiveness studies: The usefulness of the Barthel Index and the EuroQoL-5D. Quality of Life Research, 15, 020-03     Occupational Therapy Evaluation Charge Determination   History Examination Decision-Making   LOW Complexity : Brief history review  MEDIUM Complexity : 3-5 performance deficits relating to physical, cognitive , or psychosocial skils that result in activity limitations and / or participation restrictions MEDIUM Complexity : Patient may present with comorbidities that affect occupational performnce.  Miniml to moderate modification of tasks or assistance (eg, physical or verbal ) with assesment(s) is necessary to enable patient to complete evaluation       Based on the above components, the patient evaluation is determined to be of the following complexity level: LOW   Pain Rating:  Pt did endorse tenderness on backside when completing toileting hygiene. RN notified. Activity Tolerance:   Fair and requires rest breaks    After treatment patient left in no apparent distress:    Supine in bed and Call bell within reach    COMMUNICATION/EDUCATION:   The patients plan of care was discussed with: Occupational therapist and Registered nurse. Patient/family have participated as able in goal setting and plan of care. This patients plan of care is appropriate for delegation to [facility].     Thank you for this referral.  Daren Ellsworth OT  Time Calculation: 32 mins

## 2022-12-24 NOTE — PROGRESS NOTES
0730: Bedside shift change report given to Gracy Kong RN (oncoming nurse) by Nya Olivia RN (offgoing nurse). Report included the following information SBAR, Kardex, ED Summary, OR Summary, Procedure Summary, Intake/Output, MAR, Recent Results, Cardiac Rhythm ST, and Dual Neuro Assessment. 1930: Bedside shift change report given to Rafal Herrera RN (oncoming nurse) by Gracy Kong RN (offgoing nurse). Report included the following information SBAR, Kardex, ED Summary, OR Summary, Procedure Summary, Intake/Output, MAR, Recent Results, Cardiac Rhythm ST, and Dual Neuro Assessment.

## 2022-12-25 LAB
ALBUMIN SERPL-MCNC: 4.2 G/DL (ref 3.5–5)
ALBUMIN/GLOB SERPL: 1.8 (ref 1.1–2.2)
ALP SERPL-CCNC: 116 U/L (ref 45–117)
ALT SERPL-CCNC: 46 U/L (ref 12–78)
ANION GAP SERPL CALC-SCNC: 10 MMOL/L (ref 5–15)
AST SERPL-CCNC: 56 U/L (ref 15–37)
BASOPHILS # BLD: 0.1 K/UL (ref 0–0.1)
BASOPHILS NFR BLD: 1 % (ref 0–1)
BILIRUB SERPL-MCNC: 0.8 MG/DL (ref 0.2–1)
BUN SERPL-MCNC: 60 MG/DL (ref 6–20)
BUN/CREAT SERPL: 29 (ref 12–20)
CALCIUM SERPL-MCNC: 8.9 MG/DL (ref 8.5–10.1)
CHLORIDE SERPL-SCNC: 98 MMOL/L (ref 97–108)
CO2 SERPL-SCNC: 27 MMOL/L (ref 21–32)
CREAT SERPL-MCNC: 2.1 MG/DL (ref 0.7–1.3)
D DIMER PPP FEU-MCNC: 3.38 MG/L FEU (ref 0–0.65)
DIFFERENTIAL METHOD BLD: ABNORMAL
EOSINOPHIL # BLD: 0.2 K/UL (ref 0–0.4)
EOSINOPHIL NFR BLD: 4 % (ref 0–7)
ERYTHROCYTE [DISTWIDTH] IN BLOOD BY AUTOMATED COUNT: 17.9 % (ref 11.5–14.5)
FERRITIN SERPL-MCNC: 31 NG/ML (ref 26–388)
FIBRINOGEN PPP-MCNC: 330 MG/DL (ref 200–475)
GLOBULIN SER CALC-MCNC: 2.4 G/DL (ref 2–4)
GLUCOSE BLD STRIP.AUTO-MCNC: 102 MG/DL (ref 65–117)
GLUCOSE BLD STRIP.AUTO-MCNC: 175 MG/DL (ref 65–117)
GLUCOSE BLD STRIP.AUTO-MCNC: 186 MG/DL (ref 65–117)
GLUCOSE BLD STRIP.AUTO-MCNC: 96 MG/DL (ref 65–117)
GLUCOSE SERPL-MCNC: 88 MG/DL (ref 65–100)
HAPTOGLOB SERPL-MCNC: 240 MG/DL (ref 30–200)
HCT VFR BLD AUTO: 24.3 % (ref 36.6–50.3)
HGB BLD-MCNC: 7.1 G/DL (ref 12.1–17)
HGB BLD-MCNC: 7.2 G/DL (ref 12.1–17)
IMM GRANULOCYTES # BLD AUTO: 0 K/UL (ref 0–0.04)
IMM GRANULOCYTES NFR BLD AUTO: 0 % (ref 0–0.5)
INR PPP: 1.4 (ref 0.9–1.1)
LYMPHOCYTES # BLD: 1 K/UL (ref 0.8–3.5)
LYMPHOCYTES NFR BLD: 17 % (ref 12–49)
MAGNESIUM SERPL-MCNC: 3 MG/DL (ref 1.6–2.4)
MCH RBC QN AUTO: 23.5 PG (ref 26–34)
MCHC RBC AUTO-ENTMCNC: 29.6 G/DL (ref 30–36.5)
MCV RBC AUTO: 79.2 FL (ref 80–99)
MONOCYTES # BLD: 0.5 K/UL (ref 0–1)
MONOCYTES NFR BLD: 9 % (ref 5–13)
NEUTS SEG # BLD: 4 K/UL (ref 1.8–8)
NEUTS SEG NFR BLD: 69 % (ref 32–75)
NRBC # BLD: 0 K/UL (ref 0–0.01)
NRBC BLD-RTO: 0 PER 100 WBC
PHOSPHATE SERPL-MCNC: 4.8 MG/DL (ref 2.6–4.7)
PLATELET # BLD AUTO: 214 K/UL (ref 150–400)
PMV BLD AUTO: 10.9 FL (ref 8.9–12.9)
POTASSIUM SERPL-SCNC: 3.6 MMOL/L (ref 3.5–5.1)
PROCALCITONIN SERPL-MCNC: 0.46 NG/ML
PROT SERPL-MCNC: 6.6 G/DL (ref 6.4–8.2)
PROTHROMBIN TIME: 14.4 SEC (ref 9–11.1)
RBC # BLD AUTO: 3.07 M/UL (ref 4.1–5.7)
SERVICE CMNT-IMP: ABNORMAL
SERVICE CMNT-IMP: ABNORMAL
SERVICE CMNT-IMP: NORMAL
SERVICE CMNT-IMP: NORMAL
SODIUM SERPL-SCNC: 135 MMOL/L (ref 136–145)
WBC # BLD AUTO: 5.9 K/UL (ref 4.1–11.1)

## 2022-12-25 PROCEDURE — 85018 HEMOGLOBIN: CPT

## 2022-12-25 PROCEDURE — 74011000250 HC RX REV CODE- 250: Performed by: STUDENT IN AN ORGANIZED HEALTH CARE EDUCATION/TRAINING PROGRAM

## 2022-12-25 PROCEDURE — 85610 PROTHROMBIN TIME: CPT

## 2022-12-25 PROCEDURE — 74011250637 HC RX REV CODE- 250/637: Performed by: INTERNAL MEDICINE

## 2022-12-25 PROCEDURE — 84145 PROCALCITONIN (PCT): CPT

## 2022-12-25 PROCEDURE — 84100 ASSAY OF PHOSPHORUS: CPT

## 2022-12-25 PROCEDURE — 80053 COMPREHEN METABOLIC PANEL: CPT

## 2022-12-25 PROCEDURE — 94640 AIRWAY INHALATION TREATMENT: CPT

## 2022-12-25 PROCEDURE — 36415 COLL VENOUS BLD VENIPUNCTURE: CPT

## 2022-12-25 PROCEDURE — P9047 ALBUMIN (HUMAN), 25%, 50ML: HCPCS | Performed by: STUDENT IN AN ORGANIZED HEALTH CARE EDUCATION/TRAINING PROGRAM

## 2022-12-25 PROCEDURE — 83010 ASSAY OF HAPTOGLOBIN QUANT: CPT

## 2022-12-25 PROCEDURE — 74011250637 HC RX REV CODE- 250/637: Performed by: FAMILY MEDICINE

## 2022-12-25 PROCEDURE — 85379 FIBRIN DEGRADATION QUANT: CPT

## 2022-12-25 PROCEDURE — 85384 FIBRINOGEN ACTIVITY: CPT

## 2022-12-25 PROCEDURE — 82962 GLUCOSE BLOOD TEST: CPT

## 2022-12-25 PROCEDURE — 74011250636 HC RX REV CODE- 250/636: Performed by: STUDENT IN AN ORGANIZED HEALTH CARE EDUCATION/TRAINING PROGRAM

## 2022-12-25 PROCEDURE — 83735 ASSAY OF MAGNESIUM: CPT

## 2022-12-25 PROCEDURE — 74011636637 HC RX REV CODE- 636/637: Performed by: STUDENT IN AN ORGANIZED HEALTH CARE EDUCATION/TRAINING PROGRAM

## 2022-12-25 PROCEDURE — 82728 ASSAY OF FERRITIN: CPT

## 2022-12-25 PROCEDURE — 65620000000 HC RM CCU GENERAL

## 2022-12-25 PROCEDURE — 74011000258 HC RX REV CODE- 258: Performed by: STUDENT IN AN ORGANIZED HEALTH CARE EDUCATION/TRAINING PROGRAM

## 2022-12-25 PROCEDURE — 74011250637 HC RX REV CODE- 250/637: Performed by: STUDENT IN AN ORGANIZED HEALTH CARE EDUCATION/TRAINING PROGRAM

## 2022-12-25 PROCEDURE — 85025 COMPLETE CBC W/AUTO DIFF WBC: CPT

## 2022-12-25 RX ORDER — IPRATROPIUM BROMIDE AND ALBUTEROL SULFATE 2.5; .5 MG/3ML; MG/3ML
3 SOLUTION RESPIRATORY (INHALATION) EVERY 6 HOURS
Status: DISCONTINUED | OUTPATIENT
Start: 2022-12-25 | End: 2022-12-26

## 2022-12-25 RX ORDER — FUROSEMIDE 10 MG/ML
40 INJECTION INTRAMUSCULAR; INTRAVENOUS ONCE
Status: COMPLETED | OUTPATIENT
Start: 2022-12-25 | End: 2022-12-25

## 2022-12-25 RX ORDER — ALBUMIN HUMAN 250 G/1000ML
25 SOLUTION INTRAVENOUS ONCE
Status: COMPLETED | OUTPATIENT
Start: 2022-12-25 | End: 2022-12-25

## 2022-12-25 RX ORDER — FUROSEMIDE 10 MG/ML
20 INJECTION INTRAMUSCULAR; INTRAVENOUS ONCE
Status: COMPLETED | OUTPATIENT
Start: 2022-12-25 | End: 2022-12-25

## 2022-12-25 RX ORDER — DOBUTAMINE HYDROCHLORIDE 200 MG/100ML
3 INJECTION INTRAVENOUS CONTINUOUS
Status: DISCONTINUED | OUTPATIENT
Start: 2022-12-25 | End: 2023-01-03

## 2022-12-25 RX ORDER — BALSAM PERU/CASTOR OIL
OINTMENT (GRAM) TOPICAL 2 TIMES DAILY
Status: DISCONTINUED | OUTPATIENT
Start: 2022-12-25 | End: 2023-01-19

## 2022-12-25 RX ORDER — ALBUMIN HUMAN 250 G/1000ML
12.5 SOLUTION INTRAVENOUS EVERY 6 HOURS
Status: COMPLETED | OUTPATIENT
Start: 2022-12-25 | End: 2022-12-26

## 2022-12-25 RX ADMIN — FUROSEMIDE 20 MG: 10 INJECTION, SOLUTION INTRAMUSCULAR; INTRAVENOUS at 11:15

## 2022-12-25 RX ADMIN — Medication 2 UNITS: at 09:07

## 2022-12-25 RX ADMIN — ROSUVASTATIN 10 MG: 10 TABLET, FILM COATED ORAL at 21:22

## 2022-12-25 RX ADMIN — IPRATROPIUM BROMIDE 2 SPRAY: 21 SPRAY, METERED NASAL at 21:02

## 2022-12-25 RX ADMIN — SODIUM CHLORIDE, PRESERVATIVE FREE 10 ML: 5 INJECTION INTRAVENOUS at 21:23

## 2022-12-25 RX ADMIN — ALBUMIN (HUMAN) 25 G: 0.25 INJECTION, SOLUTION INTRAVENOUS at 05:25

## 2022-12-25 RX ADMIN — FUROSEMIDE 40 MG: 10 INJECTION, SOLUTION INTRAMUSCULAR; INTRAVENOUS at 13:13

## 2022-12-25 RX ADMIN — DOBUTAMINE HYDROCHLORIDE 2.5 MCG/KG/MIN: 200 INJECTION INTRAVENOUS at 19:15

## 2022-12-25 RX ADMIN — ACETAMINOPHEN 650 MG: 325 TABLET ORAL at 13:21

## 2022-12-25 RX ADMIN — CLOPIDOGREL BISULFATE 75 MG: 75 TABLET ORAL at 09:24

## 2022-12-25 RX ADMIN — PIPERACILLIN AND TAZOBACTAM 3.38 G: 3; .375 INJECTION, POWDER, FOR SOLUTION INTRAVENOUS at 14:35

## 2022-12-25 RX ADMIN — SODIUM CHLORIDE, PRESERVATIVE FREE 10 ML: 5 INJECTION INTRAVENOUS at 05:25

## 2022-12-25 RX ADMIN — ASPIRIN 81 MG: 81 TABLET, COATED ORAL at 09:24

## 2022-12-25 RX ADMIN — PIPERACILLIN AND TAZOBACTAM 3.38 G: 3; .375 INJECTION, POWDER, FOR SOLUTION INTRAVENOUS at 07:09

## 2022-12-25 RX ADMIN — ALBUMIN (HUMAN) 12.5 G: 0.25 INJECTION, SOLUTION INTRAVENOUS at 23:33

## 2022-12-25 RX ADMIN — BUMETANIDE 1 MG/HR: 0.25 INJECTION, SOLUTION INTRAMUSCULAR; INTRAVENOUS at 17:10

## 2022-12-25 RX ADMIN — SODIUM CHLORIDE, PRESERVATIVE FREE 10 ML: 5 INJECTION INTRAVENOUS at 14:36

## 2022-12-25 RX ADMIN — ALBUMIN (HUMAN) 12.5 G: 0.25 INJECTION, SOLUTION INTRAVENOUS at 16:09

## 2022-12-25 RX ADMIN — Medication 2 UNITS: at 12:10

## 2022-12-25 RX ADMIN — ALBUMIN (HUMAN) 25 G: 0.25 INJECTION, SOLUTION INTRAVENOUS at 11:00

## 2022-12-25 RX ADMIN — IPRATROPIUM BROMIDE 2 SPRAY: 21 SPRAY, METERED NASAL at 09:24

## 2022-12-25 RX ADMIN — BUMETANIDE 0.5 MG/HR: 0.25 INJECTION, SOLUTION INTRAMUSCULAR; INTRAVENOUS at 15:06

## 2022-12-25 RX ADMIN — TAMSULOSIN HYDROCHLORIDE 0.4 MG: 0.4 CAPSULE ORAL at 09:24

## 2022-12-25 RX ADMIN — BUMETANIDE 1 MG/HR: 0.25 INJECTION, SOLUTION INTRAMUSCULAR; INTRAVENOUS at 23:48

## 2022-12-25 RX ADMIN — INSULIN GLARGINE 8 UNITS: 100 INJECTION, SOLUTION SUBCUTANEOUS at 09:08

## 2022-12-25 RX ADMIN — Medication: at 21:22

## 2022-12-25 RX ADMIN — ALBUMIN (HUMAN) 25 G: 0.25 INJECTION, SOLUTION INTRAVENOUS at 00:40

## 2022-12-25 RX ADMIN — PIPERACILLIN AND TAZOBACTAM 3.38 G: 3; .375 INJECTION, POWDER, FOR SOLUTION INTRAVENOUS at 23:33

## 2022-12-25 RX ADMIN — IPRATROPIUM BROMIDE AND ALBUTEROL SULFATE 3 ML: .5; 3 SOLUTION RESPIRATORY (INHALATION) at 13:33

## 2022-12-25 NOTE — PROGRESS NOTES
0730 Shift report received from [address] Pt alert and awake. Tolerating breakfast without n/v.     0900 Up to chair, O2 sats 100, placed on room air    1030 Pt requesting to go back to bed, states he is very tired. BP 81/53 (59). Returned to bed, repeat bp 100/54 (68). Pt pursed lip breathing in 30s, 2 LNC O2 replaced. Unable to get comfortable, asking to sit up higher. Very lethargic as compared to earlier. Auscultation of lung fields unchanged from 0800 assessment. MD made aware. Orders received. 1115 UO minimal. MD aware. Orders for 20mg of Lasix. 1250 Repeat Hgb 7.1, md aware, will hold lovenox today    1315 UO continues to be minimal. Bladder scan reveals 0 cc of urine in bladder, donnelly repositioned. MD aware. Orders for 40 of lasix    1430 See I/O flowsheet for UO. Still minimal. MD ordering bumex gtt    1700 UO zero for this hour, MD aware bumex dose up to 1 mg/hr    1900 Dobutamine initiated, labs sent. Shift summary: Pt not responding to diuretics, but appears more comfortable and BP stable. Family expresses understanding.

## 2022-12-25 NOTE — PROGRESS NOTES
Urology Note      Intake/Output Summary (Last 24 hours) at 12/24/2022 1904  Last data filed at 12/24/2022 1600  Gross per 24 hour   Intake 105 ml   Output 2000 ml   Net -1895 ml       Good UOP with donnelly in place.  Cr improved, now 2.0    Plan:  Continue donnelly  Renal US in 2-3 days with continued improvement

## 2022-12-25 NOTE — PROGRESS NOTES
Verbal bedside shift change report given to Daisha Graff (oncoming nurse) by Jose L Sam (offgoing nurse). Report included the following information SBAR, Kardex, Intake/Output, MAR, Recent Results, Cardiac Rhythm Sinus Tachy, and Alarm Parameters.       2150 Asking for sleep aid, Intensivist ordered 100mg seroquel PRN, discontinued melatonin  2340 Levo off, MAPs maintaining above 65    0730 Report to [place]

## 2022-12-25 NOTE — PROGRESS NOTES
CRITICAL CARE NOTE      Name: Tyron Red   : 1950   MRN: [id number]   Date: 2022      Reason for ICU Admission: Cardiogenic shock     ICU PROBLEM LIST   Acute on chronic HFrEF (25-30%)  Cardiogenic shock  Lactic acidosis  NSTEMI  Acute hypoxic respiratory failure  TANNER on CKD3  CAD  DM    HISTORY OF PRESENT ILLNESS:   Pt is a 71 y.o M w/ PMH as noted above who presented to ED due to progressive dyspnea. History obtained from pt and spouse at bedside. Onset 3-4 days ago, DONALDSON, abdominal discomfort and distension with associated anorexia not improved by increasing home lasix dose. States difficulty voiding despite lasix doses. Of note pt w/ recent frequent admissions for HFrEF. Denies sick contacts, CP, palpitations,     Pt tachycardic, hypotensive, hypoxemic on presentation. Labs w/ troponemia, BNP above baseline, elevated lactate, and TANNER. Imaging w/ bilateral pulmonary edema, bl pulmonary effusions, bladder distension 2/2 BPH and hydronephrosis. Pt pan cultured, given dose of abx and dose of IVP lasix. Pt placed on BiPAP w/ improvement in oxygenation. Vasquez placed w/ bladder decompression. Pt initially admitted to hospitalist service, however CCM consulted due to worsening clinical status. Admitted to CCU. Respiratory status improved with diuresis, weaned vasopressors. 24 HOUR EVENTS:   NAEON, off levophed o/n. Started on  Likely transfer to floor later today if remains off levophed. Good UOP. Poor appetite. NEUROLOGICAL:    Mentation appropriate  Monitor for acute change    PULMONOLOGY:   O2 per NC PRN to maintain spO2 >92%  Suspect may require home O2 as becomes hypoxemic w/ activity  Monitor bl effusions  Pt states wish to be DNI    CARDIOVASCULAR:   Suspect cardiorenal syndrome 2/2 post obstructive renal failure  Lct down- trended  Staying net neg status by self  Ensure euvolemia, monitor for further diuretic vs fluid requirements.    Holding home GDMT, will as tolerated  ASA, plavix, statin    GASTROINTESTINAL:   Cardiac, diabetic diet  Bowel regimen for constipation    RENAL/ELECTROLYTE/FLUIDS:   Strict I/Os  Suspect post obstructive failure, Cr now improving and w/ post TANNER polyuria  At risk for development of ATN given shock on initial presentation  Urology consult for obstruction, donnelly to remain in place for at least 7-10 days  POCUS w/ resolving hydronephrosis, official US in AM  C/w flomax  RFP, replace lytes PRN  Goal euvolemia  Avoid nephrotoxins    ENDOCRINE:   Hold PO anti-glycemic  SSI, basal PRN   Glucose goal 120-180    HEMATOLOGY/ONCOLOGY:   lovenox ppx  Plt drop down to prior bl, thrombocytosis likely reactive  Hgb downtrend since admit, Repeat hgb this PM, no overt signs of bleeding    ID/MICRO:   UA concerning for UTI, Bcx NGTD    PCT neg  C/w zosyn, can de-escalate per final S&S    ICU DAILY CHECKLIST     Code Status:DNR/DNI  DVT Prophylaxis: lovenox  T/L/D: PIV, donnelly  SUP: N/A  Diet: cardiac, diabetic  Activity Level:ad jose f  ABCDEF Bundle/Checklist Completed:Yes  Disposition: Stay in ICU, possible TTF later today if remains off pressors  Multidisciplinary Rounds Completed:yes  Patient/Family Updated: Yes    Tatiana   As noted above    SUBJECTIVE:   As noted above    Review of Systems:     Review of Systems   Constitutional:  Negative for chills, fever and malaise/fatigue. HENT:  Negative for sinus pain and sore throat. Eyes:  Negative for blurred vision, double vision and discharge. Respiratory:  Negative for sputum production, wheezing and stridor. Cardiovascular:  Negative for chest pain, palpitations and leg swelling. Gastrointestinal:  Negative for abdominal pain, nausea and vomiting. Genitourinary:  Negative for frequency and hematuria. Musculoskeletal:  Negative for back pain, myalgias and neck pain. Skin:  Negative for itching and rash.    Neurological:  Negative for dizziness, sensory change, speech change, focal weakness and headaches. Psychiatric/Behavioral:  Negative for hallucinations. The patient is not nervous/anxious. OBJECTIVE:     Labs and Data: Reviewed 12/25/22  Medications: Reviewed 12/25/22  Imaging: Reviewed 12/25/22    Physical Exam  Vitals and nursing note reviewed. Constitutional:       General: He is not in acute distress. Appearance: Normal appearance. He is normal weight. HENT:      Head: Normocephalic and atraumatic. Nose: Nose normal. No congestion. Mouth/Throat:      Mouth: Mucous membranes are moist.      Pharynx: Oropharynx is clear. No oropharyngeal exudate. Eyes:      General: No scleral icterus. Extraocular Movements: Extraocular movements intact. Conjunctiva/sclera: Conjunctivae normal.      Pupils: Pupils are equal, round, and reactive to light. Cardiovascular:      Rate and Rhythm: Normal rate and regular rhythm. Pulses: Normal pulses. Heart sounds: No murmur heard. No friction rub. No gallop. Pulmonary:      Effort: Pulmonary effort is normal. No respiratory distress. Breath sounds: No stridor. No wheezing or rales. Abdominal:      General: Bowel sounds are normal. There is distension. Palpations: Abdomen is soft. Tenderness: There is no abdominal tenderness. There is no guarding. Genitourinary:     Comments: donnelly  Musculoskeletal:         General: Normal range of motion. Cervical back: Normal range of motion and neck supple. No rigidity. Right lower leg: No edema. Left lower leg: No edema. Comments: Improved edema   Skin:     Capillary Refill: Capillary refill takes less than 2 seconds. Coloration: Skin is not jaundiced. Findings: No bruising. Neurological:      General: No focal deficit present. Mental Status: He is alert and oriented to person, place, and time. Mental status is at baseline. Cranial Nerves: No cranial nerve deficit.    Psychiatric:         Mood and Affect: Mood normal.         Behavior: Behavior normal.         Thought Content: Thought content normal.        Visit Vitals  BP (!) 86/55   Pulse 86   Temp 98.2 °F (36.8 °C)   Resp 15   Ht 5' 9\" (1.753 m)   Wt 56.9 kg (125 lb 7.1 oz)   SpO2 100%   BMI 18.52 kg/m²    O2 Flow Rate (L/min): 3 l/min O2 Device: Nasal cannula Temp (24hrs), Av.7 °F (37.1 °C), Min:98.1 °F (36.7 °C), Max:99.4 °F (37.4 °C)           Intake/Output:     Intake/Output Summary (Last 24 hours) at 2022 8659  Last data filed at 2022 0700  Gross per 24 hour   Intake 1225.45 ml   Output 1325 ml   Net -99.55 ml         Imaging    22    ECHO ADULT FOLLOW-UP OR LIMITED 2022    Interpretation Summary    Left Ventricle: Severely reduced left ventricular systolic function with a visually estimated EF of 25 - 30%. Not well visualized. Left ventricle size is normal. Normal wall thickness. Normal wall motion. Normal diastolic function. Mitral Valve: Thickened leaflet. Moderate annular calcification of the mitral valve. Mild regurgitation. Contrast used: Definity. Note: image quality is poor, and even with Definity contrast, EF is very difficult to estimate, and the reported figure is approximate at best. Consider alternative imaging modalities such as MUGA or cardiac MRI to more accurately assess. Signed by: Royce Mcgarry MD on 2022  4:46 PM           CRITICAL CARE DOCUMENTATION  I had a face to face encounter with the patient, reviewed and interpreted patient data including clinical events, labs, images, vital signs, I/O's, and examined patient. I have discussed the case and the plan and management of the patient's care with the consulting services, the bedside nurses and the respiratory therapist.      NOTE OF PERSONAL INVOLVEMENT IN CARE   This patient has a high probability of imminent, clinically significant deterioration, which requires the highest level of preparedness to intervene urgently.  I participated in the decision-making and personally managed or directed the management of the following life and organ supporting interventions that required my frequent assessment to treat or prevent imminent deterioration. I personally spent 35 minutes of critical care time. This is time spent at this critically ill patient's bedside actively involved in patient care as well as the coordination of care. This does not include any procedural time which has been billed separately. Walker Hoffman MD  Staff [facility]

## 2022-12-26 ENCOUNTER — APPOINTMENT (OUTPATIENT)
Dept: ULTRASOUND IMAGING | Age: 72
DRG: 871 | End: 2022-12-26
Attending: STUDENT IN AN ORGANIZED HEALTH CARE EDUCATION/TRAINING PROGRAM
Payer: MEDICARE

## 2022-12-26 ENCOUNTER — APPOINTMENT (OUTPATIENT)
Dept: NON INVASIVE DIAGNOSTICS | Age: 72
DRG: 871 | End: 2022-12-26
Attending: STUDENT IN AN ORGANIZED HEALTH CARE EDUCATION/TRAINING PROGRAM
Payer: MEDICARE

## 2022-12-26 LAB
ALBUMIN SERPL-MCNC: 4.6 G/DL (ref 3.5–5)
ALBUMIN/GLOB SERPL: 2.1 (ref 1.1–2.2)
ALP SERPL-CCNC: 139 U/L (ref 45–117)
ALT SERPL-CCNC: 634 U/L (ref 12–78)
ANION GAP SERPL CALC-SCNC: 13 MMOL/L (ref 5–15)
AST SERPL-CCNC: 1342 U/L (ref 15–37)
BASOPHILS # BLD: 0.1 K/UL (ref 0–0.1)
BASOPHILS NFR BLD: 1 % (ref 0–1)
BILIRUB SERPL-MCNC: 1.1 MG/DL (ref 0.2–1)
BUN SERPL-MCNC: 68 MG/DL (ref 6–20)
BUN/CREAT SERPL: 27 (ref 12–20)
CALCIUM SERPL-MCNC: 9.2 MG/DL (ref 8.5–10.1)
CHLORIDE SERPL-SCNC: 97 MMOL/L (ref 97–108)
CO2 SERPL-SCNC: 26 MMOL/L (ref 21–32)
CREAT SERPL-MCNC: 2.54 MG/DL (ref 0.7–1.3)
DIFFERENTIAL METHOD BLD: ABNORMAL
ECHO AR MAX VEL PISA: 2.8 M/S
ECHO AV PEAK GRADIENT: 13 MMHG
ECHO AV PEAK VELOCITY: 1.8 M/S
ECHO AV REGURGITANT PHT: 286.8 MILLISECOND
ECHO LV E' SEPTAL VELOCITY: 5 CM/S
ECHO MV A VELOCITY: 0.87 M/S
ECHO MV AREA PHT: 5 CM2
ECHO MV E DECELERATION TIME (DT): 150.8 MS
ECHO MV E VELOCITY: 1.72 M/S
ECHO MV E/A RATIO: 1.98
ECHO MV E/E' SEPTAL: 34.4
ECHO MV PRESSURE HALF TIME (PHT): 43.7 MS
ECHO MV REGURGITANT PEAK GRADIENT: 52 MMHG
ECHO MV REGURGITANT PEAK VELOCITY: 3.6 M/S
ECHO PULMONARY ARTERY END DIASTOLIC PRESSURE: 25 MMHG
ECHO PV MAX VELOCITY: 1 M/S
ECHO PV PEAK GRADIENT: 4 MMHG
ECHO RV FREE WALL PEAK S': 7 CM/S
ECHO RV TAPSE: 1.1 CM (ref 1.7–?)
ECHO TV REGURGITANT MAX VELOCITY: 3.35 M/S
ECHO TV REGURGITANT PEAK GRADIENT: 45 MMHG
EOSINOPHIL # BLD: 0.1 K/UL (ref 0–0.4)
EOSINOPHIL NFR BLD: 2 % (ref 0–7)
ERYTHROCYTE [DISTWIDTH] IN BLOOD BY AUTOMATED COUNT: 17.8 % (ref 11.5–14.5)
GLOBULIN SER CALC-MCNC: 2.2 G/DL (ref 2–4)
GLUCOSE BLD STRIP.AUTO-MCNC: 105 MG/DL (ref 65–117)
GLUCOSE BLD STRIP.AUTO-MCNC: 201 MG/DL (ref 65–117)
GLUCOSE BLD STRIP.AUTO-MCNC: 224 MG/DL (ref 65–117)
GLUCOSE BLD STRIP.AUTO-MCNC: 263 MG/DL (ref 65–117)
GLUCOSE BLD STRIP.AUTO-MCNC: 97 MG/DL (ref 65–117)
GLUCOSE SERPL-MCNC: 106 MG/DL (ref 65–100)
HCT VFR BLD AUTO: 24.8 % (ref 36.6–50.3)
HGB BLD-MCNC: 7.2 G/DL (ref 12.1–17)
IMM GRANULOCYTES # BLD AUTO: 0.1 K/UL (ref 0–0.04)
IMM GRANULOCYTES NFR BLD AUTO: 1 % (ref 0–0.5)
LYMPHOCYTES # BLD: 0.6 K/UL (ref 0.8–3.5)
LYMPHOCYTES NFR BLD: 10 % (ref 12–49)
MAGNESIUM SERPL-MCNC: 2.8 MG/DL (ref 1.6–2.4)
MCH RBC QN AUTO: 22.9 PG (ref 26–34)
MCHC RBC AUTO-ENTMCNC: 29 G/DL (ref 30–36.5)
MCV RBC AUTO: 79 FL (ref 80–99)
MONOCYTES # BLD: 0.4 K/UL (ref 0–1)
MONOCYTES NFR BLD: 7 % (ref 5–13)
NEUTS SEG # BLD: 5 K/UL (ref 1.8–8)
NEUTS SEG NFR BLD: 79 % (ref 32–75)
NRBC # BLD: 0 K/UL (ref 0–0.01)
NRBC BLD-RTO: 0 PER 100 WBC
PHOSPHATE SERPL-MCNC: 5.4 MG/DL (ref 2.6–4.7)
PLATELET # BLD AUTO: 239 K/UL (ref 150–400)
PMV BLD AUTO: 10.8 FL (ref 8.9–12.9)
POTASSIUM SERPL-SCNC: 3 MMOL/L (ref 3.5–5.1)
PROCALCITONIN SERPL-MCNC: 0.56 NG/ML
PROT SERPL-MCNC: 6.8 G/DL (ref 6.4–8.2)
RBC # BLD AUTO: 3.14 M/UL (ref 4.1–5.7)
RBC MORPH BLD: ABNORMAL
SERVICE CMNT-IMP: ABNORMAL
SERVICE CMNT-IMP: NORMAL
SERVICE CMNT-IMP: NORMAL
SODIUM SERPL-SCNC: 136 MMOL/L (ref 136–145)
WBC # BLD AUTO: 6.3 K/UL (ref 4.1–11.1)

## 2022-12-26 PROCEDURE — 83735 ASSAY OF MAGNESIUM: CPT

## 2022-12-26 PROCEDURE — 85025 COMPLETE CBC W/AUTO DIFF WBC: CPT

## 2022-12-26 PROCEDURE — 82962 GLUCOSE BLOOD TEST: CPT

## 2022-12-26 PROCEDURE — 93306 TTE W/DOPPLER COMPLETE: CPT | Performed by: SPECIALIST

## 2022-12-26 PROCEDURE — 76770 US EXAM ABDO BACK WALL COMP: CPT

## 2022-12-26 PROCEDURE — 74011000250 HC RX REV CODE- 250: Performed by: STUDENT IN AN ORGANIZED HEALTH CARE EDUCATION/TRAINING PROGRAM

## 2022-12-26 PROCEDURE — 93306 TTE W/DOPPLER COMPLETE: CPT

## 2022-12-26 PROCEDURE — 80053 COMPREHEN METABOLIC PANEL: CPT

## 2022-12-26 PROCEDURE — 36415 COLL VENOUS BLD VENIPUNCTURE: CPT

## 2022-12-26 PROCEDURE — 99222 1ST HOSP IP/OBS MODERATE 55: CPT | Performed by: SPECIALIST

## 2022-12-26 PROCEDURE — 74011636637 HC RX REV CODE- 636/637: Performed by: STUDENT IN AN ORGANIZED HEALTH CARE EDUCATION/TRAINING PROGRAM

## 2022-12-26 PROCEDURE — 74011250637 HC RX REV CODE- 250/637: Performed by: INTERNAL MEDICINE

## 2022-12-26 PROCEDURE — 84100 ASSAY OF PHOSPHORUS: CPT

## 2022-12-26 PROCEDURE — 94640 AIRWAY INHALATION TREATMENT: CPT

## 2022-12-26 PROCEDURE — 74011000258 HC RX REV CODE- 258: Performed by: STUDENT IN AN ORGANIZED HEALTH CARE EDUCATION/TRAINING PROGRAM

## 2022-12-26 PROCEDURE — 65620000000 HC RM CCU GENERAL

## 2022-12-26 PROCEDURE — 84145 PROCALCITONIN (PCT): CPT

## 2022-12-26 PROCEDURE — 74011250637 HC RX REV CODE- 250/637: Performed by: FAMILY MEDICINE

## 2022-12-26 PROCEDURE — 74011250637 HC RX REV CODE- 250/637: Performed by: STUDENT IN AN ORGANIZED HEALTH CARE EDUCATION/TRAINING PROGRAM

## 2022-12-26 PROCEDURE — 74011250636 HC RX REV CODE- 250/636: Performed by: STUDENT IN AN ORGANIZED HEALTH CARE EDUCATION/TRAINING PROGRAM

## 2022-12-26 PROCEDURE — P9047 ALBUMIN (HUMAN), 25%, 50ML: HCPCS | Performed by: STUDENT IN AN ORGANIZED HEALTH CARE EDUCATION/TRAINING PROGRAM

## 2022-12-26 RX ORDER — QUETIAPINE FUMARATE 25 MG/1
25 TABLET, FILM COATED ORAL
Status: DISCONTINUED | OUTPATIENT
Start: 2022-12-26 | End: 2022-12-27

## 2022-12-26 RX ORDER — LIDOCAINE 4 G/100G
1 PATCH TOPICAL EVERY 24 HOURS
Status: DISCONTINUED | OUTPATIENT
Start: 2022-12-26 | End: 2023-01-19 | Stop reason: HOSPADM

## 2022-12-26 RX ORDER — IPRATROPIUM BROMIDE AND ALBUTEROL SULFATE 2.5; .5 MG/3ML; MG/3ML
3 SOLUTION RESPIRATORY (INHALATION) EVERY 6 HOURS
Status: DISCONTINUED | OUTPATIENT
Start: 2022-12-26 | End: 2022-12-26

## 2022-12-26 RX ORDER — IPRATROPIUM BROMIDE AND ALBUTEROL SULFATE 2.5; .5 MG/3ML; MG/3ML
3 SOLUTION RESPIRATORY (INHALATION)
Status: DISCONTINUED | OUTPATIENT
Start: 2022-12-27 | End: 2023-01-01

## 2022-12-26 RX ORDER — POTASSIUM CHLORIDE 750 MG/1
40 TABLET, FILM COATED, EXTENDED RELEASE ORAL
Status: COMPLETED | OUTPATIENT
Start: 2022-12-26 | End: 2022-12-26

## 2022-12-26 RX ADMIN — SODIUM CHLORIDE, PRESERVATIVE FREE 10 ML: 5 INJECTION INTRAVENOUS at 13:15

## 2022-12-26 RX ADMIN — SENNOSIDES AND DOCUSATE SODIUM 1 TABLET: 50; 8.6 TABLET ORAL at 06:49

## 2022-12-26 RX ADMIN — ALBUMIN (HUMAN) 12.5 G: 0.25 INJECTION, SOLUTION INTRAVENOUS at 13:03

## 2022-12-26 RX ADMIN — SODIUM CHLORIDE, PRESERVATIVE FREE 10 ML: 5 INJECTION INTRAVENOUS at 22:09

## 2022-12-26 RX ADMIN — INSULIN GLARGINE 8 UNITS: 100 INJECTION, SOLUTION SUBCUTANEOUS at 10:16

## 2022-12-26 RX ADMIN — BUMETANIDE 1 MG/HR: 0.25 INJECTION, SOLUTION INTRAMUSCULAR; INTRAVENOUS at 20:13

## 2022-12-26 RX ADMIN — ACETAMINOPHEN 650 MG: 325 TABLET ORAL at 10:24

## 2022-12-26 RX ADMIN — IPRATROPIUM BROMIDE AND ALBUTEROL SULFATE 3 ML: .5; 3 SOLUTION RESPIRATORY (INHALATION) at 05:51

## 2022-12-26 RX ADMIN — Medication 3 UNITS: at 18:17

## 2022-12-26 RX ADMIN — SODIUM CHLORIDE, PRESERVATIVE FREE 10 ML: 5 INJECTION INTRAVENOUS at 05:52

## 2022-12-26 RX ADMIN — Medication 3 UNITS: at 13:13

## 2022-12-26 RX ADMIN — ALBUMIN (HUMAN) 12.5 G: 0.25 INJECTION, SOLUTION INTRAVENOUS at 04:22

## 2022-12-26 RX ADMIN — Medication 5 UNITS: at 22:09

## 2022-12-26 RX ADMIN — POLYETHYLENE GLYCOL 3350 17 G: 17 POWDER, FOR SOLUTION ORAL at 06:49

## 2022-12-26 RX ADMIN — ENOXAPARIN SODIUM 30 MG: 100 INJECTION SUBCUTANEOUS at 15:02

## 2022-12-26 RX ADMIN — IPRATROPIUM BROMIDE AND ALBUTEROL SULFATE 3 ML: .5; 3 SOLUTION RESPIRATORY (INHALATION) at 11:06

## 2022-12-26 RX ADMIN — ROSUVASTATIN 10 MG: 10 TABLET, FILM COATED ORAL at 22:09

## 2022-12-26 RX ADMIN — SODIUM CHLORIDE, PRESERVATIVE FREE 10 ML: 5 INJECTION INTRAVENOUS at 13:16

## 2022-12-26 RX ADMIN — IPRATROPIUM BROMIDE 2 SPRAY: 21 SPRAY, METERED NASAL at 10:16

## 2022-12-26 RX ADMIN — IPRATROPIUM BROMIDE AND ALBUTEROL SULFATE 3 ML: .5; 3 SOLUTION RESPIRATORY (INHALATION) at 20:23

## 2022-12-26 RX ADMIN — QUETIAPINE FUMARATE 25 MG: 25 TABLET ORAL at 22:09

## 2022-12-26 RX ADMIN — ACETAMINOPHEN 650 MG: 325 TABLET ORAL at 17:14

## 2022-12-26 RX ADMIN — POTASSIUM CHLORIDE 40 MEQ: 750 TABLET, FILM COATED, EXTENDED RELEASE ORAL at 06:49

## 2022-12-26 RX ADMIN — IPRATROPIUM BROMIDE 2 SPRAY: 21 SPRAY, METERED NASAL at 20:15

## 2022-12-26 RX ADMIN — PIPERACILLIN AND TAZOBACTAM 3.38 G: 3; .375 INJECTION, POWDER, FOR SOLUTION INTRAVENOUS at 06:00

## 2022-12-26 RX ADMIN — CLOPIDOGREL BISULFATE 75 MG: 75 TABLET ORAL at 10:16

## 2022-12-26 RX ADMIN — PIPERACILLIN AND TAZOBACTAM 3.38 G: 3; .375 INJECTION, POWDER, FOR SOLUTION INTRAVENOUS at 22:09

## 2022-12-26 RX ADMIN — TAMSULOSIN HYDROCHLORIDE 0.4 MG: 0.4 CAPSULE ORAL at 10:16

## 2022-12-26 RX ADMIN — POTASSIUM CHLORIDE 40 MEQ: 750 TABLET, FILM COATED, EXTENDED RELEASE ORAL at 10:16

## 2022-12-26 RX ADMIN — PIPERACILLIN AND TAZOBACTAM 3.38 G: 3; .375 INJECTION, POWDER, FOR SOLUTION INTRAVENOUS at 15:01

## 2022-12-26 RX ADMIN — ASPIRIN 81 MG: 81 TABLET, COATED ORAL at 10:16

## 2022-12-26 RX ADMIN — Medication: at 20:15

## 2022-12-26 RX ADMIN — Medication: at 10:24

## 2022-12-26 NOTE — PROGRESS NOTES
CRITICAL CARE NOTE      Name: Lorenzo Foreman   : 1950   MRN: [id number]   Date: 2022      Reason for ICU Admission: Cardiogenic shock     ICU PROBLEM LIST   Acute on chronic HFrEF (25-30%)  Acute hypoxic respiratory failure  TANNER on CKD3  Cardiogenic shock, resolving  Lactic acidosis  NSTEMI  CAD  DM  transaminitis    HISTORY OF PRESENT ILLNESS:   Pt is a 71 y.o M w/ PMH as noted above who presented to ED due to progressive dyspnea. History obtained from pt and spouse at bedside. Onset 3-4 days ago, DONALDSON, abdominal discomfort and distension with associated anorexia not improved by increasing home lasix dose. States difficulty voiding despite lasix doses. Of note pt w/ recent frequent admissions for HFrEF. Denies sick contacts, CP, palpitations,     Pt tachycardic, hypotensive, hypoxemic on presentation. Labs w/ troponemia, BNP above baseline, elevated lactate, and TANNER. Imaging w/ bilateral pulmonary edema, bl pulmonary effusions, bladder distension 2/2 BPH and hydronephrosis. Pt pan cultured, given dose of abx and dose of IVP lasix. Pt placed on BiPAP w/ improvement in oxygenation. Vasquez placed w/ bladder decompression. Pt initially admitted to hospitalist service, however CCM consulted due to worsening clinical status. Admitted to CCU. Respiratory status improved with diuresis, weaned vasopressors. 24 HOUR EVENTS:   Pt w/ decreased UOP not responsive to IVP diuresis, started on bumex and dobutamine gtt with improvement in UOP and BP. Formal ECHO today, and reach out to cardiology as pt with stepwise with multiple admissions this year. May require home inotrope therapy. NEUROLOGICAL:    - Mentation appropriate  - Monitor for acute change    PULMONOLOGY:   - O2 per NC PRN to maintain spO2 >92%  - Suspect may require home O2 as becomes hypoxemic w/ activity  - Monitor bl effusions  - Pt states wish to be DNI    CARDIOVASCULAR:   - C/w dobutamine and bumex gtt  - Trend CVP, UOP.  Monitor for evidence of hypoperfusion  - Repeat ECHO today  - will reach to pt's usual cardiologist   - Suspect initial presentation was cardiorenal syndrome 2/2 post obstructive renal failure, now with likely ATN from hypotension and infection. Goal to wean off dobutamine, however given stepwise decline this year may require home inotropes. - Holding GDMT due to hypotension  - ASA, plavix, statin    GASTROINTESTINAL:   - Cardiac, diabetic diet  - Bowel regimen for constipation  - shock liver, trend    RENAL/ELECTROLYTE/FLUIDS:   - Strict I/Os  - Suspect initially post obstructive failure, now Cr uptrending w/ likely ATN  - continue bumex as above  - Monitor for RRT needs, will consult nephrology at that time  - per Urology consult for obstruction, donnelly to remain in place for at least 7-10 days  - US w/ resolving hydronephrosis  - C/w flomax  - RFP, replace lytes PRN  - Goal euvolemia  - Avoid nephrotoxins, renally dose medications    ENDOCRINE:   - Hold PO anti-glycemic  - SSI, basal PRN   - Glucose goal 120-180    HEMATOLOGY/ONCOLOGY:   lovenox ppx  No evidence of MAHA on coags  Plt drop down to prior bl, thrombocytosis likely reactive  Hgb down from baseline, stable, likely anemia of acute illness  no overt signs of bleeding    ID/MICRO:   UA concerning for UTI, Bcx NGTD    PCT neg    C/w zosyn, plan for 7 days    ICU DAILY CHECKLIST     Code Status:DNR/DNI  DVT Prophylaxis: lovenox  T/L/D: PIV, donnelly  SUP: N/A  Diet: cardiac, diabetic  Activity Level:ad jose f  ABCDEF Bundle/Checklist Completed:Yes  Disposition: Stay in ICU  Multidisciplinary Rounds Completed:yes  Patient/Family Updated: Yes    HOSPITAL COURSE/DAILY EVENT LOG   As noted above    SUBJECTIVE:   As noted above    Review of Systems:     Review of Systems   Constitutional:  Negative for chills, fever and malaise/fatigue. HENT:  Negative for sinus pain and sore throat. Eyes:  Negative for blurred vision, double vision and discharge.    Respiratory:  Negative for sputum production, wheezing and stridor. Cardiovascular:  Negative for chest pain, palpitations and leg swelling. Gastrointestinal:  Negative for abdominal pain, nausea and vomiting. Genitourinary:  Negative for frequency and hematuria. Musculoskeletal:  Negative for back pain, myalgias and neck pain. Skin:  Negative for itching and rash. Neurological:  Negative for dizziness, sensory change, speech change, focal weakness and headaches. Psychiatric/Behavioral:  Negative for hallucinations. The patient is not nervous/anxious. OBJECTIVE:     Labs and Data: Reviewed 12/26/22  Medications: Reviewed 12/26/22  Imaging: Reviewed 12/26/22    Physical Exam  Vitals and nursing note reviewed. Constitutional:       General: He is not in acute distress. Appearance: Normal appearance. He is normal weight. HENT:      Head: Normocephalic and atraumatic. Nose: Nose normal. No congestion. Mouth/Throat:      Mouth: Mucous membranes are moist.      Pharynx: Oropharynx is clear. No oropharyngeal exudate. Eyes:      General: No scleral icterus. Extraocular Movements: Extraocular movements intact. Conjunctiva/sclera: Conjunctivae normal.      Pupils: Pupils are equal, round, and reactive to light. Cardiovascular:      Rate and Rhythm: Normal rate and regular rhythm. Pulses: Normal pulses. Heart sounds: No murmur heard. No friction rub. No gallop. Pulmonary:      Effort: Pulmonary effort is normal. No respiratory distress. Breath sounds: No stridor. No wheezing or rales. Abdominal:      General: Bowel sounds are normal. There is distension. Palpations: Abdomen is soft. Tenderness: There is no abdominal tenderness. There is no guarding. Genitourinary:     Comments: andrzej  Musculoskeletal:         General: Normal range of motion. Cervical back: Normal range of motion and neck supple. No rigidity. Right lower leg: No edema.       Left lower leg: No edema. Comments: Improved edema   Skin:     Capillary Refill: Capillary refill takes less than 2 seconds. Coloration: Skin is not jaundiced. Findings: No bruising. Neurological:      General: No focal deficit present. Mental Status: He is alert and oriented to person, place, and time. Mental status is at baseline. Cranial Nerves: No cranial nerve deficit. Psychiatric:         Mood and Affect: Mood normal.         Behavior: Behavior normal.         Thought Content: Thought content normal.        Visit Vitals  /74   Pulse (!) 104   Temp 98.2 °F (36.8 °C)   Resp 23   Ht 5' 9\" (1.753 m)   Wt 54.9 kg (121 lb 0.5 oz)   SpO2 99%   BMI 17.87 kg/m²    O2 Flow Rate (L/min): 2 l/min O2 Device: Nasal cannula Temp (24hrs), Av.9 °F (37.2 °C), Min:97.7 °F (36.5 °C), Max:99.7 °F (37.6 °C)    CVP (mmHg): 9 mmHg (22 0700)      Intake/Output:     Intake/Output Summary (Last 24 hours) at 2022 0958  Last data filed at 2022 0700  Gross per 24 hour   Intake 1616.2 ml   Output 3355 ml   Net -1738.8 ml         Imaging    22    ECHO ADULT FOLLOW-UP OR LIMITED 2022    Interpretation Summary    Left Ventricle: Severely reduced left ventricular systolic function with a visually estimated EF of 25 - 30%. Not well visualized. Left ventricle size is normal. Normal wall thickness. Normal wall motion. Normal diastolic function. Mitral Valve: Thickened leaflet. Moderate annular calcification of the mitral valve. Mild regurgitation. Contrast used: Definity. Note: image quality is poor, and even with Definity contrast, EF is very difficult to estimate, and the reported figure is approximate at best. Consider alternative imaging modalities such as MUGA or cardiac MRI to more accurately assess.     Signed by: Ortega Zuñiga MD on 2022  4:46 PM           CRITICAL CARE DOCUMENTATION  I had a face to face encounter with the patient, reviewed and interpreted patient data including clinical events, labs, images, vital signs, I/O's, and examined patient. I have discussed the case and the plan and management of the patient's care with the consulting services, the bedside nurses and the respiratory therapist.      NOTE OF PERSONAL INVOLVEMENT IN CARE   This patient has a high probability of imminent, clinically significant deterioration, which requires the highest level of preparedness to intervene urgently. I participated in the decision-making and personally managed or directed the management of the following life and organ supporting interventions that required my frequent assessment to treat or prevent imminent deterioration. I personally spent 35 minutes of critical care time. This is time spent at this critically ill patient's bedside actively involved in patient care as well as the coordination of care. This does not include any procedural time which has been billed separately. Walker Corona MD  Staff [facility]

## 2022-12-26 NOTE — PROGRESS NOTES
Transition of Care Plan  RUR: 24%  DISPOSITION: The disposition plan is home with [facility] through [address]   May need Home oxygen set up  F/U with PCP/Specialist    Transport: family         Suraj Smith, off levophed o/n. Started on  Likely transfer to floor later today if remains off levophed. Good UOP. Poor appetite. US for this morning.       9:28 AM  JETT Nieves

## 2022-12-26 NOTE — PROGRESS NOTES
0730 Shift report received from [facility]    1300 Echo and US complete. 1430 Turning pt in bed causing dyspnea. UOP has been downtrending, MD aware    1500 Dobutamine up to 5 mg/hr per MD. Raquel Liang for this hour zero. Consult called to Cardiology. 1600 Increased bumex to 1 mg/hr per attending. 5 Dr. Amber Wolf at bedside.

## 2022-12-26 NOTE — PROGRESS NOTES
Verbal bedside shift change report given to Hanna Vidal (oncoming nurse) by [facility] [employer] (offgoing nurse). Report included the following information SBAR, Kardex, Intake/Output, MAR, Recent Results, Cardiac Rhythm Sinus Tachy, and Alarm Parameters. 2130 Family called for update, informed urine output had increased.  Wants to be present during day rounds  0000 High urine output since 2000, decreased bumex to 0.5mg/hr per intensivist  0020 Ultrasound tech bedside  0200 Wife called for update  0630 Potassium 3, orders to replace via PO route, 40mEq x2 2 hours apart    0730 Report to [employer]

## 2022-12-26 NOTE — CONSULTS
[facility]  Cardiology Care Note                  [x]Initial visit     []Established visit     Patient Name: Rebecca Olea Z:[id number]  Primary Cardiologist: Jocelynn Soliman MD  Consulting Cardiologist: Sukhwinder Clark MD     Reason for initial visit:cmp    HPI:   71 y.o. male with hx of CAD s/p CABG, ischemic cardiomyopathy, CHF with reduced EF, Hypertension, Chronic Kidney Disease Stage III, Peripheral Vascular Disease, and DM Type II,  Cath  12/6/2022 showed open LIMA and RCA graft, some in-stent restenosis in the long graft from the left main to OM 2 but not severe and the OM1 was more severely diseased in the small vessel. SUBJECTIVE:  dyspneic with minimal activities  Ok at rest  Previous orthopnea pnd now better       Assessment and Plan     CMP: likely ischemic, decompensated with cardiorenal syndrome and elevation of lft. Off gdmt . At this time proceed with inotropic rx with dobutamine (just increased earlier ) and assessed in 24 hours. continue bumex. repeated echo still with ef around 25% (sub optimal images)  Hold off bb entresto and corlanor for now  Continue bumex  Cath results noted for now medical rx, elevated troponin on 12/22 noted , diffuse cad not a likely candidate for PCI (?LM) but we will defer to Dr. Zaid Sun  Anemia likely related to ckd hh stable for now but if further reduction 7 or below would recommend transfusion  Ckd: consider renal consult  Discussed with patient and his family             ____________________________________________________________    Cardiac testing  12/22/22    ECHO ADULT COMPLETE 12/26/2022 12/26/2022    Interpretation Summary    Left Ventricle: Severely reduced left ventricular systolic function with a visually estimated EF of 25 - 30%. Left ventricle size is normal. Normal wall thickness. There are regional wall motion abnormalities.  Grade II diastolic dysfunction with increased LAP. Right Ventricle: Moderately reduced systolic function. TAPSE is abnormal. TAPSE is 1.1 cm. Aortic Valve: Mild stenosis of the aortic valve. AV peak gradient is 13 mmHg. AV peak velocity is 1.8 m/s. Mitral Valve: Not well visualized. Moderate annular calcification at the posterior leaflet of the mitral valve. Mild to moderate regurgitation. Tricuspid Valve: Mildly elevated RVSP. Left Atrium: Left atrium is moderately dilated. Signed by: Diamond Smith MD on 12/26/2022  3:13 PM        10/27/22    NUCLEAR CARDIAC STRESS TEST 10/27/2022 11/1/2022    Interpretation Summary    Nuclear Findings: The study is positive for myocardial ischemia. The defect appears to be ischemia. The areas of ischemia are similar to 3/15/2021 study. Nuclear Findings: There is a moderate severity left ventricular stress perfusion defect that is medium in size present in the mid to distal inferolateral and anterolateral segment(s) that is reversible. The defect is consistent with abnormal perfusion in the LCx territory. There is normal wall motion in the defect area. The defect appears to be probable ischemia. There is a moderate severity second left ventricular stress perfusion defect. The defect appears to be infarction. Nuclear Findings: Abnormal left ventricular systolic function post-stress. Septal dyskinesis. Post-stress ejection fraction is 39%. ECG: Resting ECG demonstrates normal sinus rhythm. ECG: Stress ECG was negative for ischemia. Stress Test: A pharmacological stress test was performed using lexiscan. Blood pressure demonstrated a normal response and heart rate demonstrated a normal response to stress.  The patient's heart rate recovery was normal. The patient reported no symptoms during the stress test.    Signed by: Estefani Page MD on 10/27/2022  4:45 PM    12/05/22    CARDIAC PROCEDURE 12/09/2022 12/9/2022  INVASIVE VASCULAR PROCEDURE 12/09/2022 12/9/2022    Conclusion  Findings  1. Normal LVEDP  2. Severe native multivessel coronary artery disease  3. Patent LIMA to LAD and vein graft to distal RCA  4. Recurrent ISR in OM1 stent with now 60 to 70% restenosis  5. Recoil of left main and circumflex stent with now recurrent 40 to 50% stenosis. 6.  Progression of ostial left main disease now to about 60% stenosis  7. Progression of disease in jailed first marginal branch now with diffuse 90% stenosis  8. High-grade stenosis in the mid to distal right potential femoral artery treated with 6 x 40 mm impact drug-coated balloon angioplasty to reduce the stenosis to less than 40%    Access: Right femoral ultrasound-guided no issues  Contrast 60 cc    Recommendations  1. Continue guideline directed medical therapy for secondary prevention of coronary events  2. Diffuse nature of disease and relatively small size of his arteries make medical management is the best possible option for his coronary artery disease. 3.  Plavix based dual antiplatelet therapy  Addendum by: Olga Carl MD on 12/9/2022 10:17 AM    Signed by: Remy Fowler MD on 12/9/2022 10:11 AM        Most recent HS troponins:  No results for input(s): TROPHS in the last 72 hours.     No lab exists for component:  CKMB  ECG: normal sinus rhythm, ST depression in inferolateral leads s/o ischemia  Review of Systems    []All other systems reviewed and all negative except as written in HPI    [] Patient unable to provide secondary to condition         Past Medical History:   Diagnosis Date    CAD (coronary artery disease) 11/10/2016    NSTEMI & 2 stents    Deafness 10/28/2012    DM (diabetes mellitus) ([facility]     Elevated cholesterol     Hypertension     NSTEMI (non-ST elevated myocardial infarction) ([facility] 11/10/2016     Past Surgical History:   Procedure Laterality Date    COLONOSCOPY N/A 6/28/2018    COLONOSCOPY performed by Richard Amezquita MD at [address] [place] [address] Sw ENDOSCOPY    HX APPENDECTOMY      IA CARDIAC SURG PROCEDURE UNLIST  11/11/2016    2 stents     Social Hx:  reports that he has never smoked. He has never been exposed to tobacco smoke. He has never used smokeless tobacco. He reports current alcohol use of about 2.0 standard drinks per week. He reports that he does not use drugs. Family Hx: family history includes Elevated Lipids in his brother, brother, and brother; Heart Attack in his father; Heart Disease in his father; Hypertension in his mother; No Known Problems in his daughter, sister, and son. No Known Allergies       OBJECTIVE:  Wt Readings from Last 3 Encounters:   12/26/22 121 lb 0.5 oz (54.9 kg)   12/19/22 129 lb (58.5 kg)   12/16/22 126 lb 8.7 oz (57.4 kg)       Intake/Output Summary (Last 24 hours) at 12/26/2022 1601  Last data filed at 12/26/2022 1400  Gross per 24 hour   Intake 1083.3 ml   Output 4035 ml   Net -2951.7 ml         Physical Exam    Vitals:   Vitals:    12/26/22 1200 12/26/22 1300 12/26/22 1400 12/26/22 1500   BP: 105/63 (!) 101/56 (!) 103/57 (!) 111/59   Pulse: (!) 110 (!) 103 (!) 110 (!) 112   Resp: 24 30 29 29   Temp: 99.5 °F (37.5 °C) 99.5 °F (37.5 °C) 99.7 °F (37.6 °C) 100 °F (37.8 °C)   SpO2: 96% 96% 96% 95%   Weight:       Height:         Telemetry: normal sinus rhythm    Neck: no JVD  Heart: regular rate and rhythm  Lungs: diminished breath sounds R base, L base  Abdomen: soft, non-tender. Bowel sounds normal. No masses,  no organomegaly  Extremities: no edema        Data Review:     Radiology:   XR Results (most recent):  Results from Hospital Encounter encounter on 12/22/22    XR CHEST PORT    Narrative  EXAM: XR CHEST PORT    DATE: 12/24/2022 6:04 PM    INDICATION: CVC placement    COMPARISON: Chest radiograph December 24, 2022 at 1412 hours    FINDINGS: AP portable chest radiograph. There is a new RIGHT IJ central venous  catheter with the tip near the cavoatrial junction. Patient is status post  sternotomy and CABG. The heart is mildly enlarged but stable. The lungs are  hyperinflated. There are persistent, small bilateral pleural effusions. There is  no pneumothorax. A skin fold projects over the RIGHT upper lung. The vascular  clarity is again mildly diminished. Impression  1. RIGHT IJ catheter is in satisfactory position with the tip at the cavoatrial  junction. No pneumothorax. 2. No change in small bilateral effusions. CT Results (most recent):  Results from Hospital Encounter encounter on 12/22/22    CT ABD PELV WO CONT    Narrative  EXAM: CT ABD PELV WO CONT    INDICATION: rlq abdominal pain    COMPARISON: 5/3/2014    IV CONTRAST: None. ORAL CONTRAST: None    TECHNIQUE:  Thin axial images were obtained through the abdomen and pelvis. Coronal and  sagittal reformats were generated. CT dose reduction was achieved through use of  a standardized protocol tailored for this examination and automatic exposure  control for dose modulation. The absence of intravenous contrast material reduces the sensitivity for  evaluation of the vasculature and solid organs. FINDINGS:  LOWER THORAX: Small bilateral pleural effusions. Partial collapse bases  LIVER: No mass. BILIARY TREE: Gallstones. No evidence of acute cholecystitis CBD is not dilated. SPLEEN: within normal limits. PANCREAS: No focal abnormality. ADRENALS: 19 mm right adrenal nodule unchanged,  KIDNEYS/URETERS: Mild bilateral hydronephrosis. No stone  STOMACH: Unremarkable. SMALL BOWEL: No dilatation or wall thickening. COLON: No dilatation or wall thickening. APPENDIX: Surgically absent  PERITONEUM: No ascites or pneumoperitoneum. RETROPERITONEUM: No lymphadenopathy or aortic aneurysm. Extensive vascular  calcifications  REPRODUCTIVE ORGANS: Mildly enlarged  URINARY BLADDER: Massive distention  BONES: No destructive bone lesion. ABDOMINAL WALL: Small umbilical hernia containing fat. ADDITIONAL COMMENTS: N/A    Impression  1.  Bladder is massively distended with mild bilateral hydronephrosis. May  indicate bladder outlet obstruction. Prostate is mildly enlarged  2. Gallstones. No acute cholecystitis  3. Small bilateral pleural effusions    MRI Results (most recent):  Results from Hospital Encounter encounter on 05/22/16    MRI LUMB SPINE WO CONT    Narrative  **Final Report**      ICD Codes / Adm. Diagnosis: 724.2  M54.5 / Lumbago  Low back pain  Examination:  MR Cameron Kumari CON  - [id number] - May 22 2016  1:45PM  Accession No:  [id number]  Reason:  Low back pain      REPORT:  Indication: Pain and back extends into right leg to foot    Exam: MRI of the lumbar spine. Sequences include sagittal and axial T1 and  T2-weighted images. Sagittal STIR. Comparisons: April 27, 2016    Contrast: None    Findings: Alignment is normal. There is no evidence of marrow replacement or  acute fracture. Cord terminus is within normal limits. Paraspinous soft  tissues are within normal limits. T12-L1: No stenosis    L1-L2: There is desiccation of this disc with a small bulge but no stenosis    L2-L3: There is desiccation of this disc with a small bulge but no stenosis    L3-L4: There is mild left facet arthropathy but no stenosis    L4-L5: There is disc height loss with a small disc bulge. Facet arthropathy. No stenosis    L5-S1: There is disc height loss with a small disc bulge and left  paracentral disc extrusion extending inferiorly. This mildly distorts the  left S1 nerve root in the subarticular zone and left lateral recess. There  are facet degenerative changes with moderate left and mild-to-moderate right  foraminal narrowing    Impression  :  1. Multilevel degenerative change detailed by level above most significant  at L5-S1                Signing/Reading Doctor: Barbara Smith ([id number])  Approved: Barbara Smith ([id number])  May 23 2016  8:39AM        No results for input(s): CPK, TROIQ in the last 72 hours.     No lab exists for component: CKQMB, CPKMB, BMPP  Recent Labs     12/26/22  0302 12/25/22  0443    135*   K 3.0* 3.6   CL 97 98   CO2 26 27   BUN 68* 60*   CREA 2.54* 2.10*   * 88   PHOS 5.4* 4.8*   CA 9.2 8.9     Recent Labs     12/26/22  0301 12/25/22  1249 12/25/22  0443   WBC 6.3  --  5.9   HGB 7.2* 7.1* 7.2*   HCT 24.8*  --  24.3*     --  214     Recent Labs     12/26/22  0302 12/25/22  1912 12/25/22  0443   PTP  --  14.4*  --    INR  --  1.4*  --    *  --  116     No results for input(s): CHOL, LDLC in the last 72 hours. No lab exists for component: TGL, HDLC,  HBA1C  No results for input(s): CRP, TSH, TSHEXT in the last 72 hours.     No lab exists for component: ESR        Current meds:    Current Facility-Administered Medications:     lidocaine 4 % patch 1 Patch, 1 Patch, TransDERmal, Q24H, Walker Aponte MD    bumetanide (BUMEX) 0.25 mg/mL infusion, 0-2 mg/hr, IntraVENous, TITRATE, Walker Aponte MD    albuterol-ipratropium (DUO-NEB) 2.5 MG-0.5 MG/3 ML, 3 mL, Nebulization, Q6H, Walker Aponte MD, 3 mL at 12/26/22 1106    DOBUTamine (DOBUTREX) 500 mg/250 mL (2,000 mcg/mL) infusion, 5 mcg/kg/min, IntraVENous, CONTINUOUS, Beto MOLINA MD, Last Rate: 8.5 mL/hr at 12/26/22 1454, 5 mcg/kg/min at 12/26/22 1454    balsam peru-castor oiL (VENELEX) ointment, , Topical, BID, Sp Johnson MD, Given at 12/26/22 1024    insulin glargine (LANTUS) injection 8 Units, 8 Units, SubCUTAneous, DAILY, Beto MOLINA MD, 8 Units at 12/26/22 1016    [COMPLETED] piperacillin-tazobactam (ZOSYN) 4.5 g in 0.9% sodium chloride (MBP/ADV) 100 mL MBP, 4.5 g, IntraVENous, NOW, Last Rate: 200 mL/hr at 12/24/22 1818, 4.5 g at 12/24/22 1818 **FOLLOWED BY** piperacillin-tazobactam (ZOSYN) 3.375 g in 0.9% sodium chloride (MBP/ADV) 100 mL MBP, 3.375 g, IntraVENous, Q8H, Walker Aponte MD, Last Rate: 25 mL/hr at 12/26/22 1501, 3.375 g at 12/26/22 1501    NOREPINephrine (LEVOPHED) 8 mg in 5% dextrose 250mL (32 mcg/mL) infusion, 0.5-30 mcg/min, IntraVENous, TITRATE, Sixto Oconnor MD, Stopped at 12/24/22 2342    alteplase (CATHFLO) 1 mg in sterile water (preservative free) 1 mL injection, 1 mg, InterCATHeter, PRN, Sixto Oconnor MD    bacitracin 500 unit/gram packet 1 Packet, 1 Packet, Topical, PRN, Coleen MOLINA MD    glucose chewable tablet 16 g, 4 Tablet, Oral, PRN, Coleen MOLINA MD    glucagon (GLUCAGEN) injection 1 mg, 1 mg, IntraMUSCular, PRN, Sixto Oconnor MD    dextrose 10 % infusion 0-250 mL, 0-250 mL, IntraVENous, PRN, Sixto Oconnor MD    insulin lispro (HUMALOG) injection, , SubCUTAneous, AC&HS, Sixto Oconnor MD, 3 Units at 12/26/22 1313    dextrose 10 % infusion 0-250 mL, 0-250 mL, IntraVENous, PRN, Sixto Oconnor MD    insulin lispro (HUMALOG) injection 3 Units, 0.05 Units/kg, SubCUTAneous, TID WITH MEALS, Coleen MOLINA MD, 3 Units at 12/24/22 1201    senna-docusate (PERICOLACE) 8.6-50 mg per tablet 1 Tablet, 1 Tablet, Oral, BID PRN, Sixto Oconnor MD, 1 Tablet at 12/26/22 1830    rosuvastatin (CRESTOR) tablet 10 mg, 10 mg, Oral, QHS, Coleen MOLINA MD, 10 mg at 12/25/22 2122    bisacodyL (DULCOLAX) suppository 10 mg, 10 mg, Rectal, QHS PRN, Coleen MOLINA MD    sodium chloride (NS) flush 5-40 mL, 5-40 mL, IntraVENous, Q8H, Walker Aponte MD, 10 mL at 12/26/22 1316    sodium chloride (NS) flush 5-40 mL, 5-40 mL, IntraVENous, PRN, Coleen MOLINA MD    acetaminophen (TYLENOL) tablet 650 mg, 650 mg, Oral, Q6H PRN, 650 mg at 12/26/22 1024 **OR** acetaminophen (TYLENOL) suppository 650 mg, 650 mg, Rectal, Q6H PRN, Walker Tierney MD    polyethylene glycol (MIRALAX) packet 17 g, 17 g, Oral, DAILY PRN, Sixto Oconnor MD, 17 g at 12/26/22 0649    ondansetron (ZOFRAN ODT) tablet 4 mg, 4 mg, Oral, Q8H PRN **OR** ondansetron (ZOFRAN) injection 4 mg, 4 mg, IntraVENous, Q6H PRN, Sixto Oconnor MD, 4 mg at 12/24/22 0849    aspirin delayed-release tablet 81 mg, 81 mg, Oral, DAILY, Ranjit Pearson MD, 81 mg at 12/26/22 1016    clopidogreL (PLAVIX) tablet 75 mg, 75 mg, Oral, DAILY, Becky Pierson MD, 75 mg at 12/26/22 1016    [Held by provider] empagliflozin (JARDIANCE) tablet 10 mg, 10 mg, Oral, DAILY, Becky Pierson MD    ergocalciferol capsule 50,000 Units, 50,000 Units, Oral, Q7D, Bill SALMERON MD, 50,000 Units at 12/23/22 0923    ipratropium (ATROVENT) 21 mcg (0.03 %) nasal spray 2 Spray, 2 Spray, Both Nostrils, Q12H, Ranjit Pearson MD, 2 Helper at 12/26/22 1016    [Held by provider] ivabradine (CORLANOR) tablet 2.5 mg, 2.5 mg, Oral, BID WITH MEALS, Ranjit Pearson MD, 2.5 mg at 12/23/22 0850    tamsulosin (FLOMAX) capsule 0.4 mg, 0.4 mg, Oral, DAILY, Ranjit Pearson MD, 0.4 mg at 12/26/22 1016    sodium chloride (NS) flush 5-40 mL, 5-40 mL, IntraVENous, Q8H, Walker Aponte MD, 10 mL at 12/26/22 1315    sodium chloride (NS) flush 5-40 mL, 5-40 mL, IntraVENous, PRN, Allen MOLINA MD    enoxaparin (LOVENOX) injection 30 mg, 30 mg, SubCUTAneous, DAILY, Allen MOLINA MD, 30 mg at 12/26/22 1502    Chris Lofton MD  [facility] [address] [place] [address] 13, [address] [facility] [address] [place] [address]  [phone]      Nick Vela MD

## 2022-12-27 ENCOUNTER — TELEPHONE (OUTPATIENT)
Dept: FAMILY MEDICINE CLINIC | Age: 72
End: 2022-12-27

## 2022-12-27 LAB
25(OH)D3 SERPL-MCNC: 73.3 NG/ML (ref 30–100)
ALBUMIN SERPL-MCNC: 4.9 G/DL (ref 3.5–5)
ALBUMIN/GLOB SERPL: 1.8 (ref 1.1–2.2)
ALP SERPL-CCNC: 197 U/L (ref 45–117)
ALT SERPL-CCNC: 701 U/L (ref 12–78)
ANION GAP SERPL CALC-SCNC: 9 MMOL/L (ref 5–15)
ANION GAP SERPL CALC-SCNC: 9 MMOL/L (ref 5–15)
AST SERPL-CCNC: 701 U/L (ref 15–37)
BACTERIA SPEC CULT: NORMAL
BASOPHILS # BLD: 0.1 K/UL (ref 0–0.1)
BASOPHILS NFR BLD: 1 % (ref 0–1)
BILIRUB SERPL-MCNC: 1.1 MG/DL (ref 0.2–1)
BUN SERPL-MCNC: 71 MG/DL (ref 6–20)
BUN SERPL-MCNC: 74 MG/DL (ref 6–20)
BUN/CREAT SERPL: 25 (ref 12–20)
BUN/CREAT SERPL: 25 (ref 12–20)
CALCIUM SERPL-MCNC: 10.1 MG/DL (ref 8.5–10.1)
CALCIUM SERPL-MCNC: 10.3 MG/DL (ref 8.5–10.1)
CHLORIDE SERPL-SCNC: 95 MMOL/L (ref 97–108)
CHLORIDE SERPL-SCNC: 97 MMOL/L (ref 97–108)
CO2 SERPL-SCNC: 29 MMOL/L (ref 21–32)
CO2 SERPL-SCNC: 30 MMOL/L (ref 21–32)
CREAT SERPL-MCNC: 2.82 MG/DL (ref 0.7–1.3)
CREAT SERPL-MCNC: 2.94 MG/DL (ref 0.7–1.3)
CREAT UR-MCNC: <13 MG/DL
CRP SERPL HS-MCNC: >9.5 MG/L
DIFFERENTIAL METHOD BLD: ABNORMAL
EOSINOPHIL # BLD: 0.2 K/UL (ref 0–0.4)
EOSINOPHIL NFR BLD: 4 % (ref 0–7)
ERYTHROCYTE [DISTWIDTH] IN BLOOD BY AUTOMATED COUNT: 17.8 % (ref 11.5–14.5)
ERYTHROCYTE [SEDIMENTATION RATE] IN BLOOD: 38 MM/HR (ref 0–20)
FERRITIN SERPL-MCNC: 39 NG/ML (ref 26–388)
GLOBULIN SER CALC-MCNC: 2.7 G/DL (ref 2–4)
GLUCOSE BLD STRIP.AUTO-MCNC: 188 MG/DL (ref 65–117)
GLUCOSE BLD STRIP.AUTO-MCNC: 215 MG/DL (ref 65–117)
GLUCOSE BLD STRIP.AUTO-MCNC: 297 MG/DL (ref 65–117)
GLUCOSE BLD STRIP.AUTO-MCNC: 299 MG/DL (ref 65–117)
GLUCOSE SERPL-MCNC: 202 MG/DL (ref 65–100)
GLUCOSE SERPL-MCNC: 322 MG/DL (ref 65–100)
HAV IGM SER QL: NONREACTIVE
HBV CORE IGM SER QL: NONREACTIVE
HBV SURFACE AG SER QL: <0.1 INDEX
HBV SURFACE AG SER QL: NEGATIVE
HCT VFR BLD AUTO: 27.6 % (ref 36.6–50.3)
HCV AB SERPL QL IA: NONREACTIVE
HGB BLD-MCNC: 8.3 G/DL (ref 12.1–17)
IMM GRANULOCYTES # BLD AUTO: 0 K/UL (ref 0–0.04)
IMM GRANULOCYTES NFR BLD AUTO: 0 % (ref 0–0.5)
IRON SATN MFR SERPL: 7 % (ref 20–50)
IRON SERPL-MCNC: 18 UG/DL (ref 35–150)
LYMPHOCYTES # BLD: 0.7 K/UL (ref 0.8–3.5)
LYMPHOCYTES NFR BLD: 12 % (ref 12–49)
MAGNESIUM SERPL-MCNC: 2.7 MG/DL (ref 1.6–2.4)
MCH RBC QN AUTO: 22.6 PG (ref 26–34)
MCHC RBC AUTO-ENTMCNC: 30.1 G/DL (ref 30–36.5)
MCV RBC AUTO: 75 FL (ref 80–99)
MONOCYTES # BLD: 0.5 K/UL (ref 0–1)
MONOCYTES NFR BLD: 8 % (ref 5–13)
NEUTS SEG # BLD: 4.7 K/UL (ref 1.8–8)
NEUTS SEG NFR BLD: 75 % (ref 32–75)
NRBC # BLD: 0 K/UL (ref 0–0.01)
NRBC BLD-RTO: 0 PER 100 WBC
PHOSPHATE SERPL-MCNC: 5.5 MG/DL (ref 2.6–4.7)
PLATELET # BLD AUTO: 259 K/UL (ref 150–400)
PMV BLD AUTO: 10.5 FL (ref 8.9–12.9)
POTASSIUM SERPL-SCNC: 3.3 MMOL/L (ref 3.5–5.1)
POTASSIUM SERPL-SCNC: 4.1 MMOL/L (ref 3.5–5.1)
PROCALCITONIN SERPL-MCNC: 0.45 NG/ML
PROT SERPL-MCNC: 7.6 G/DL (ref 6.4–8.2)
PROT UR-MCNC: 30 MG/DL (ref 0–11.9)
PROT/CREAT UR-RTO: <2.3
PSA SERPL-MCNC: 3.9 NG/ML (ref 0.01–4)
RBC # BLD AUTO: 3.68 M/UL (ref 4.1–5.7)
RBC MORPH BLD: ABNORMAL
SERVICE CMNT-IMP: ABNORMAL
SERVICE CMNT-IMP: NORMAL
SODIUM SERPL-SCNC: 133 MMOL/L (ref 136–145)
SODIUM SERPL-SCNC: 136 MMOL/L (ref 136–145)
SP1: NORMAL
SP2: NORMAL
SP3: NORMAL
T4 FREE SERPL-MCNC: 1.1 NG/DL (ref 0.8–1.5)
TIBC SERPL-MCNC: 248 UG/DL (ref 250–450)
TSH SERPL DL<=0.05 MIU/L-ACNC: 2.12 UIU/ML (ref 0.36–3.74)
URATE SERPL-MCNC: 6.8 MG/DL (ref 3.5–7.2)
WBC # BLD AUTO: 6.2 K/UL (ref 4.1–11.1)

## 2022-12-27 PROCEDURE — 86038 ANTINUCLEAR ANTIBODIES: CPT

## 2022-12-27 PROCEDURE — 84145 PROCALCITONIN (PCT): CPT

## 2022-12-27 PROCEDURE — 82728 ASSAY OF FERRITIN: CPT

## 2022-12-27 PROCEDURE — 82962 GLUCOSE BLOOD TEST: CPT

## 2022-12-27 PROCEDURE — 36415 COLL VENOUS BLD VENIPUNCTURE: CPT

## 2022-12-27 PROCEDURE — 97535 SELF CARE MNGMENT TRAINING: CPT

## 2022-12-27 PROCEDURE — 84100 ASSAY OF PHOSPHORUS: CPT

## 2022-12-27 PROCEDURE — 85652 RBC SED RATE AUTOMATED: CPT

## 2022-12-27 PROCEDURE — 97530 THERAPEUTIC ACTIVITIES: CPT

## 2022-12-27 PROCEDURE — 74011250636 HC RX REV CODE- 250/636: Performed by: NURSE PRACTITIONER

## 2022-12-27 PROCEDURE — 86658 ENTEROVIRUS ANTIBODY: CPT

## 2022-12-27 PROCEDURE — 86644 CMV ANTIBODY: CPT

## 2022-12-27 PROCEDURE — 84443 ASSAY THYROID STIM HORMONE: CPT

## 2022-12-27 PROCEDURE — 74011636637 HC RX REV CODE- 636/637: Performed by: STUDENT IN AN ORGANIZED HEALTH CARE EDUCATION/TRAINING PROGRAM

## 2022-12-27 PROCEDURE — 65620000000 HC RM CCU GENERAL

## 2022-12-27 PROCEDURE — 84550 ASSAY OF BLOOD/URIC ACID: CPT

## 2022-12-27 PROCEDURE — 74011250636 HC RX REV CODE- 250/636: Performed by: STUDENT IN AN ORGANIZED HEALTH CARE EDUCATION/TRAINING PROGRAM

## 2022-12-27 PROCEDURE — 82784 ASSAY IGA/IGD/IGG/IGM EACH: CPT

## 2022-12-27 PROCEDURE — 83735 ASSAY OF MAGNESIUM: CPT

## 2022-12-27 PROCEDURE — 87798 DETECT AGENT NOS DNA AMP: CPT

## 2022-12-27 PROCEDURE — 77010033678 HC OXYGEN DAILY

## 2022-12-27 PROCEDURE — 99231 SBSQ HOSP IP/OBS SF/LOW 25: CPT | Performed by: CLINICAL NURSE SPECIALIST

## 2022-12-27 PROCEDURE — 99233 SBSQ HOSP IP/OBS HIGH 50: CPT | Performed by: INTERNAL MEDICINE

## 2022-12-27 PROCEDURE — 99223 1ST HOSP IP/OBS HIGH 75: CPT | Performed by: NURSE PRACTITIONER

## 2022-12-27 PROCEDURE — 82306 VITAMIN D 25 HYDROXY: CPT

## 2022-12-27 PROCEDURE — 74011000250 HC RX REV CODE- 250: Performed by: STUDENT IN AN ORGANIZED HEALTH CARE EDUCATION/TRAINING PROGRAM

## 2022-12-27 PROCEDURE — 74011000258 HC RX REV CODE- 258: Performed by: STUDENT IN AN ORGANIZED HEALTH CARE EDUCATION/TRAINING PROGRAM

## 2022-12-27 PROCEDURE — 83540 ASSAY OF IRON: CPT

## 2022-12-27 PROCEDURE — 80074 ACUTE HEPATITIS PANEL: CPT

## 2022-12-27 PROCEDURE — 86141 C-REACTIVE PROTEIN HS: CPT

## 2022-12-27 PROCEDURE — 74011250637 HC RX REV CODE- 250/637: Performed by: FAMILY MEDICINE

## 2022-12-27 PROCEDURE — 74011250637 HC RX REV CODE- 250/637: Performed by: INTERNAL MEDICINE

## 2022-12-27 PROCEDURE — 84439 ASSAY OF FREE THYROXINE: CPT

## 2022-12-27 PROCEDURE — 86738 MYCOPLASMA ANTIBODY: CPT

## 2022-12-27 PROCEDURE — 99291 CRITICAL CARE FIRST HOUR: CPT | Performed by: INTERNAL MEDICINE

## 2022-12-27 PROCEDURE — 84156 ASSAY OF PROTEIN URINE: CPT

## 2022-12-27 PROCEDURE — 86778 TOXOPLASMA ANTIBODY IGM: CPT

## 2022-12-27 PROCEDURE — 94640 AIRWAY INHALATION TREATMENT: CPT

## 2022-12-27 PROCEDURE — 80053 COMPREHEN METABOLIC PANEL: CPT

## 2022-12-27 PROCEDURE — 74011250637 HC RX REV CODE- 250/637: Performed by: STUDENT IN AN ORGANIZED HEALTH CARE EDUCATION/TRAINING PROGRAM

## 2022-12-27 PROCEDURE — 85025 COMPLETE CBC W/AUTO DIFF WBC: CPT

## 2022-12-27 PROCEDURE — 74011000258 HC RX REV CODE- 258: Performed by: NURSE PRACTITIONER

## 2022-12-27 PROCEDURE — 84153 ASSAY OF PSA TOTAL: CPT

## 2022-12-27 RX ORDER — DEXMEDETOMIDINE HYDROCHLORIDE 4 UG/ML
.1-1.5 INJECTION, SOLUTION INTRAVENOUS
Status: DISCONTINUED | OUTPATIENT
Start: 2022-12-27 | End: 2022-12-27

## 2022-12-27 RX ORDER — MIDAZOLAM HYDROCHLORIDE 1 MG/ML
2 INJECTION, SOLUTION INTRAMUSCULAR; INTRAVENOUS ONCE
Status: DISCONTINUED | OUTPATIENT
Start: 2022-12-27 | End: 2022-12-27

## 2022-12-27 RX ORDER — POTASSIUM CHLORIDE 750 MG/1
40 TABLET, FILM COATED, EXTENDED RELEASE ORAL
Status: COMPLETED | OUTPATIENT
Start: 2022-12-27 | End: 2022-12-27

## 2022-12-27 RX ORDER — HEPARIN SODIUM 5000 [USP'U]/ML
5000 INJECTION, SOLUTION INTRAVENOUS; SUBCUTANEOUS EVERY 12 HOURS
Status: DISCONTINUED | OUTPATIENT
Start: 2022-12-27 | End: 2023-01-19 | Stop reason: HOSPADM

## 2022-12-27 RX ORDER — QUETIAPINE FUMARATE 25 MG/1
50 TABLET, FILM COATED ORAL
Status: DISCONTINUED | OUTPATIENT
Start: 2022-12-27 | End: 2023-01-19 | Stop reason: HOSPADM

## 2022-12-27 RX ORDER — INSULIN GLARGINE 100 [IU]/ML
20 INJECTION, SOLUTION SUBCUTANEOUS DAILY
Status: DISCONTINUED | OUTPATIENT
Start: 2022-12-28 | End: 2022-12-28

## 2022-12-27 RX ADMIN — QUETIAPINE FUMARATE 50 MG: 25 TABLET ORAL at 21:37

## 2022-12-27 RX ADMIN — POTASSIUM CHLORIDE 40 MEQ: 750 TABLET, FILM COATED, EXTENDED RELEASE ORAL at 07:00

## 2022-12-27 RX ADMIN — DOBUTAMINE HYDROCHLORIDE 5 MCG/KG/MIN: 200 INJECTION INTRAVENOUS at 08:17

## 2022-12-27 RX ADMIN — SODIUM CHLORIDE, PRESERVATIVE FREE 10 ML: 5 INJECTION INTRAVENOUS at 21:40

## 2022-12-27 RX ADMIN — INSULIN GLARGINE 8 UNITS: 100 INJECTION, SOLUTION SUBCUTANEOUS at 08:24

## 2022-12-27 RX ADMIN — CLOPIDOGREL BISULFATE 75 MG: 75 TABLET ORAL at 08:26

## 2022-12-27 RX ADMIN — IPRATROPIUM BROMIDE 2 SPRAY: 21 SPRAY, METERED NASAL at 08:30

## 2022-12-27 RX ADMIN — SODIUM CHLORIDE, PRESERVATIVE FREE 10 ML: 5 INJECTION INTRAVENOUS at 13:53

## 2022-12-27 RX ADMIN — Medication 3 UNITS: at 16:40

## 2022-12-27 RX ADMIN — SODIUM CHLORIDE, PRESERVATIVE FREE 10 ML: 5 INJECTION INTRAVENOUS at 05:55

## 2022-12-27 RX ADMIN — PIPERACILLIN AND TAZOBACTAM 3.38 G: 3; .375 INJECTION, POWDER, FOR SOLUTION INTRAVENOUS at 07:00

## 2022-12-27 RX ADMIN — HEPARIN SODIUM 5000 UNITS: 5000 INJECTION INTRAVENOUS; SUBCUTANEOUS at 10:27

## 2022-12-27 RX ADMIN — ACETAZOLAMIDE SODIUM 500 MG: 500 INJECTION, POWDER, LYOPHILIZED, FOR SOLUTION INTRAVENOUS at 21:37

## 2022-12-27 RX ADMIN — IPRATROPIUM BROMIDE AND ALBUTEROL SULFATE 3 ML: .5; 3 SOLUTION RESPIRATORY (INHALATION) at 20:37

## 2022-12-27 RX ADMIN — TAMSULOSIN HYDROCHLORIDE 0.4 MG: 0.4 CAPSULE ORAL at 08:26

## 2022-12-27 RX ADMIN — ASPIRIN 81 MG: 81 TABLET, COATED ORAL at 08:25

## 2022-12-27 RX ADMIN — IPRATROPIUM BROMIDE AND ALBUTEROL SULFATE 3 ML: .5; 3 SOLUTION RESPIRATORY (INHALATION) at 14:48

## 2022-12-27 RX ADMIN — IPRATROPIUM BROMIDE AND ALBUTEROL SULFATE 3 ML: .5; 3 SOLUTION RESPIRATORY (INHALATION) at 08:12

## 2022-12-27 RX ADMIN — Medication: at 08:29

## 2022-12-27 RX ADMIN — Medication 5 UNITS: at 11:59

## 2022-12-27 RX ADMIN — HEPARIN SODIUM 5000 UNITS: 5000 INJECTION INTRAVENOUS; SUBCUTANEOUS at 21:37

## 2022-12-27 RX ADMIN — Medication 3 UNITS: at 21:36

## 2022-12-27 RX ADMIN — ACETAZOLAMIDE SODIUM 500 MG: 500 INJECTION, POWDER, LYOPHILIZED, FOR SOLUTION INTRAVENOUS at 09:24

## 2022-12-27 RX ADMIN — Medication 5 UNITS: at 16:39

## 2022-12-27 RX ADMIN — Medication: at 21:40

## 2022-12-27 RX ADMIN — Medication 8 UNITS: at 17:56

## 2022-12-27 RX ADMIN — Medication 2 UNITS: at 08:23

## 2022-12-27 RX ADMIN — IRON SUCROSE 200 MG: 20 INJECTION, SOLUTION INTRAVENOUS at 16:42

## 2022-12-27 RX ADMIN — IPRATROPIUM BROMIDE 2 SPRAY: 21 SPRAY, METERED NASAL at 21:40

## 2022-12-27 RX ADMIN — BUMETANIDE 1 MG/HR: 0.25 INJECTION, SOLUTION INTRAMUSCULAR; INTRAVENOUS at 08:17

## 2022-12-27 RX ADMIN — BUMETANIDE 1 MG/HR: 0.25 INJECTION, SOLUTION INTRAMUSCULAR; INTRAVENOUS at 20:23

## 2022-12-27 NOTE — CONSULTS
[facility]  Cardiology Care Note                  []Initial visit     [x]Established visit     Patient Name: Osiel Lakhani - ENZ:[id number]  Primary Cardiologist: Padmini Chatterjee MD  Consulting Cardiologist: Blank Patrick MD     Reason for initial visit:  dyspnea, decompensated HF, tachycardia. HPI:     CAD with CABG 6/9/2018. NSTEMI in November 2016 and resolved pulmonary HTN. Stent to circumflex an LAD in 2016. Stress echo in 2018 with a drop in BP with exercise and a drop in EF. Cath on 6/22/18 that showed even with a 5F catheter, he dampened with suspected ostial 3 vessel disease. Echo 3/27/2021 = EF 55 to 60% mild AR MR TR LAE MAC  Nuclear 3/18/2021 EF 64% medium size defect apical and inferior lateral reversible ischemia medium defect mid and inferolateral nonreversible appear to be infarction intermediate risk stress test .  PCI 02/28/2022--PCI of native left main, proximal circumflex and OM1    Over the past 2 months has been reporting progressive dyspnea, atypical chest discomfort and significant tachycardia with progressive deterioration in LV function. Previously thought to be secondary to ischemic cardiomyopathy but overall picture is more consistent with likely myocarditis/alternative cause of heart failure. Now presenting with obstructive uropathy causing bilateral hydronephrosis and TANNER and subsequently having transient hypotension from sepsis complicating an otherwise picture of cardiogenic shock with multiorgan dysfunction. SUBJECTIVE:    Condition be short of breath and orthopnea. Poor appetite. Assessment and Plan       Cardiogenic shock.     2  CAD native vessel with patent PULLIAM to LAD, patent vein graft to RCA    3  Cardiomyopathy systolic heart failure: Rapid deterioration in LV function with overall WMA on recent echo appears not to be in teritorry of ischemia suggesting alternative mechanism fo cardiomyopathy(stress SMP vs myocarditis). Notably apex is significantly hypokinetic but LIMA to LAD is patent on recent cardiac catheterization hence cardiomyopathy is unlikely to be ischemia driven. Moreover with progressive clinical deterioration and features suggestive of cardiogenic shock, possibility of myocarditis is likely. We will have advanced heart failure services through a.m regarding potential benefit of endomyocardial biopsy/steroids. 4.  Aortic stenosis mild     5. Diabetes mellitus type 2 previously managed by hospitalist service now has a glucose of 377 patient is currently on antihyperglycemic's and will involve diabetes treatment center for assistance  On insulin lispro and alogliptin    6 . Acute liver injury likely secondary to hypotension    7. 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 Baseline creatinine about 1.5 to 1.7 mg/dL with superimposed TANNER likely secondary from recent hypotension. 8. PAD: S/P Right SFA PTCA. Needs follow up with Dr Shaggy Singleton in 2 -3 months with MARIANELA/Dopplers    9. Bladder obstruction contributing to bilateral hydronephrosis and TANNER    Neal Kendall appears to be critically ill in ICU. He is demonstrating signs of multiorgan dysfunction. His blood pressures appear to be now stabilized with dobutamine drip should be continued for foreseeable future. If his renal function does not improve in the next 48 hours, prognosis is likely to be extremely poor since it will be hard to dialyze him in the setting of ineffective cardiac output. His most recent echocardiogram does suggest evidence of biventricular dysfunction with normal RV function appearing worse compared to previously. Clinical course does suggest possibility of subacute myocarditis. However given recent infection there is a risk to empiric steroid treatment assuming this may be giant cell myocarditis.   Will discuss with advanced heart failure services regarding this option. No beta-blockers, Corlanor, Entresto or other guideline directed medical therapy at this point of time given cardiogenic shock. I personally spent 40 minutes of critical care time. This is time spent at this critically ill patient's bedside / unit / floor actively involved in patient care as well as the coordination of care. This does not include any procedural time which has been billed separately. I had a face to face encounter with the patient, reviewed and interpreted patient data including clinical events, labs, images, vital signs, I/O's, and examined patient. I have discussed the case and the plan and management of the patient's care with the consulting services and care team.  This patient has a high probability of imminent and/or clinically significant deterioration. ____________________________________________________________    Cardiac testing  12/22/22    ECHO ADULT COMPLETE 12/26/2022 12/26/2022    Interpretation Summary    Left Ventricle: Severely reduced left ventricular systolic function with a visually estimated EF of 25 - 30%. Left ventricle size is normal. Normal wall thickness. There are regional wall motion abnormalities. Grade II diastolic dysfunction with increased LAP. Right Ventricle: Moderately reduced systolic function. TAPSE is abnormal. TAPSE is 1.1 cm. Aortic Valve: Mild stenosis of the aortic valve. AV peak gradient is 13 mmHg. AV peak velocity is 1.8 m/s. Mitral Valve: Not well visualized. Moderate annular calcification at the posterior leaflet of the mitral valve. Mild to moderate regurgitation. Tricuspid Valve: Mildly elevated RVSP. Left Atrium: Left atrium is moderately dilated. Signed by: Radha Torres MD on 12/26/2022  3:13 PM         10/27/22    NUCLEAR CARDIAC STRESS TEST 10/27/2022 11/1/2022    Interpretation Summary    Nuclear Findings: The study is positive for myocardial ischemia. The defect appears to be ischemia. The areas of ischemia are similar to 3/15/2021 study. Nuclear Findings: There is a moderate severity left ventricular stress perfusion defect that is medium in size present in the mid to distal inferolateral and anterolateral segment(s) that is reversible. The defect is consistent with abnormal perfusion in the LCx territory. There is normal wall motion in the defect area. The defect appears to be probable ischemia. There is a moderate severity second left ventricular stress perfusion defect. The defect appears to be infarction. Nuclear Findings: Abnormal left ventricular systolic function post-stress. Septal dyskinesis. Post-stress ejection fraction is 39%. ECG: Resting ECG demonstrates normal sinus rhythm. ECG: Stress ECG was negative for ischemia. Stress Test: A pharmacological stress test was performed using lexiscan. Blood pressure demonstrated a normal response and heart rate demonstrated a normal response to stress. The patient's heart rate recovery was normal. The patient reported no symptoms during the stress test.    Signed by: Carmine De Guzman MD on 10/27/2022  4:45 PM    02/28/22    CARDIAC PROCEDURE 02/28/2022 2/28/2022    Conclusion  Findings  1. Severe native multivessel coronary disease  2. Patent LIMA to LAD, vein graft to distal RCA with severe disease in the native vessels  3. Occluded vein graft to OM 2. Native OM 2 severely diseased along with proximal to mid circumflex as well as distal left main. Overall the vessel is significantly remodeled negatively. Additionally there is significant disease in the first marginal branch which is even smaller as well as AV groove branch right at the takeoff of OM2.   Single stent 2.25 x 28 mm Xience was placed extending from the OM 2 into the circumflex into the distal left main and sequentially dilated with 2.25 noncompliant, 2 5 noncompliant, 3 oh noncompliant and 3 5 noncompliant to perform multilevel POT to manage multiple bifurcations on the way. Atherectomy was considered but given small size of the vessels, was not deemed to be absolutely safe for the obtuse marginal lesion. Overall stent expanded fairly well related to the size of the vessels. 4.  Normal LVEDP    Access right radial: Small vessel, tortuous} brachiocephalic  Contrast 65 cc    Recommendations  1. Plavix based dual antiplatelet therapy  2. Guideline directed medical therapy for secondary prevention of coronary events    Signed by: Jimi Sullivan MD on 2/28/2022  4:22 PM        Most recent HS troponins:  No results for input(s): TROPHS in the last 72 hours. No lab exists for component:  CKMB      Review of Systems    [x]All other systems reviewed and all negative except as written in HPI    [] Patient unable to provide secondary to condition         Past Medical History:   Diagnosis Date    CAD (coronary artery disease) 11/10/2016    NSTEMI & 2 stents    Deafness 10/28/2012    DM (diabetes mellitus) ([facility] [address]     Elevated cholesterol     Hypertension     NSTEMI (non-ST elevated myocardial infarction) ([facility] [address] 11/10/2016     Past Surgical History:   Procedure Laterality Date    COLONOSCOPY N/A 6/28/2018    COLONOSCOPY performed by Libertad Campos MD at [address]  11/11/2016    2 stents     Social Hx:  reports that he has never smoked. He has never been exposed to tobacco smoke. He has never used smokeless tobacco. He reports current alcohol use of about 2.0 standard drinks per week. He reports that he does not use drugs. Family Hx: family history includes Elevated Lipids in his brother, brother, and brother; Heart Attack in his father; Heart Disease in his father; Hypertension in his mother; No Known Problems in his daughter, sister, and son.   No Known Allergies       OBJECTIVE:  Wt Readings from Last 3 Encounters:   12/27/22 118 lb 9.7 oz (53.8 kg)   12/19/22 129 lb (58.5 kg)   12/16/22 126 lb 8.7 oz (57.4 kg)       Intake/Output Summary (Last 24 hours) at 12/27/2022 1346  Last data filed at 12/27/2022 1200  Gross per 24 hour   Intake 1307.22 ml   Output 4235 ml   Net -2927.78 ml           Physical Exam    Vitals:   Vitals:    12/27/22 1000 12/27/22 1100 12/27/22 1200 12/27/22 1300   BP: 123/65 112/66 (!) 125/55 (!) 114/59   Pulse: (!) 107 (!) 107 (!) 115 (!) 106   Resp: 20 24 20 24   Temp: 97.5 °F (36.4 °C) 98.2 °F (36.8 °C) 98.6 °F (37 °C) 98.6 °F (37 °C)   SpO2:  97% 100% 98%   Weight:       Height:         Telemetry: normal sinus rhythm    BP (!) 114/59   Pulse (!) 106   Temp 98.6 °F (37 °C)   Resp 24   Ht 5' 9\" (1.753 m)   Wt 118 lb 9.7 oz (53.8 kg)   SpO2 98%   BMI 17.52 kg/m²   General:    Alert, cooperative, no distress. Psychiatric:    Normal Mood and affect    Eye/ENT:      Pupils equal, No asymmetry, Conjunctival pink. Able to hear voice at normal amplitude   Lungs:      Visibly symmetric chest expansion, No palpable tenderness. clear   Heart[de-identified]    Regular rate and rhythm, S1, S2 normal, no murmur, click, rub or gallop. No JVD, Normal palpable peripheral pulses. No cyanosis   Abdomen:     Soft, non-tender. Bowel sounds normal. No masses,  No      organomegaly. Extremities:   Extremities normal, atraumatic, no edema. Neurologic:   CN II-XII grossly intact. No gross focal deficits           Data Review:     Radiology:   XR Results (most recent):  Results from Hospital Encounter encounter on 12/22/22    XR CHEST PORT    Narrative  EXAM: XR CHEST PORT    DATE: 12/24/2022 6:04 PM    INDICATION: CVC placement    COMPARISON: Chest radiograph December 24, 2022 at 1412 hours    FINDINGS: AP portable chest radiograph. There is a new RIGHT IJ central venous  catheter with the tip near the cavoatrial junction. Patient is status post  sternotomy and CABG. The heart is mildly enlarged but stable. The lungs are  hyperinflated. There are persistent, small bilateral pleural effusions.  There is  no pneumothorax. A skin fold projects over the RIGHT upper lung. The vascular  clarity is again mildly diminished. Impression  1. RIGHT IJ catheter is in satisfactory position with the tip at the cavoatrial  junction. No pneumothorax. 2. No change in small bilateral effusions. CT Results (most recent):  Results from Hospital Encounter encounter on 12/22/22    CT ABD PELV WO CONT    Narrative  EXAM: CT ABD PELV WO CONT    INDICATION: rlq abdominal pain    COMPARISON: 5/3/2014    IV CONTRAST: None. ORAL CONTRAST: None    TECHNIQUE:  Thin axial images were obtained through the abdomen and pelvis. Coronal and  sagittal reformats were generated. CT dose reduction was achieved through use of  a standardized protocol tailored for this examination and automatic exposure  control for dose modulation. The absence of intravenous contrast material reduces the sensitivity for  evaluation of the vasculature and solid organs. FINDINGS:  LOWER THORAX: Small bilateral pleural effusions. Partial collapse bases  LIVER: No mass. BILIARY TREE: Gallstones. No evidence of acute cholecystitis CBD is not dilated. SPLEEN: within normal limits. PANCREAS: No focal abnormality. ADRENALS: 19 mm right adrenal nodule unchanged,  KIDNEYS/URETERS: Mild bilateral hydronephrosis. No stone  STOMACH: Unremarkable. SMALL BOWEL: No dilatation or wall thickening. COLON: No dilatation or wall thickening. APPENDIX: Surgically absent  PERITONEUM: No ascites or pneumoperitoneum. RETROPERITONEUM: No lymphadenopathy or aortic aneurysm. Extensive vascular  calcifications  REPRODUCTIVE ORGANS: Mildly enlarged  URINARY BLADDER: Massive distention  BONES: No destructive bone lesion. ABDOMINAL WALL: Small umbilical hernia containing fat. ADDITIONAL COMMENTS: N/A    Impression  1. Bladder is massively distended with mild bilateral hydronephrosis. May  indicate bladder outlet obstruction. Prostate is mildly enlarged  2. Gallstones.  No acute cholecystitis  3. Small bilateral pleural effusions    MRI Results (most recent):  Results from Hospital Encounter encounter on 05/22/16    MRI LUMB SPINE WO CONT    Narrative  **Final Report**      ICD Codes / Adm. Diagnosis: 724.2  M54.5 / Lumbago  Low back pain  Examination:  MR Jasmina Dodge CON  - [id number] - May 22 2016  1:45PM  Accession No:  [id number]  Reason:  Low back pain      REPORT:  Indication: Pain and back extends into right leg to foot    Exam: MRI of the lumbar spine. Sequences include sagittal and axial T1 and  T2-weighted images. Sagittal STIR. Comparisons: April 27, 2016    Contrast: None    Findings: Alignment is normal. There is no evidence of marrow replacement or  acute fracture. Cord terminus is within normal limits. Paraspinous soft  tissues are within normal limits. T12-L1: No stenosis    L1-L2: There is desiccation of this disc with a small bulge but no stenosis    L2-L3: There is desiccation of this disc with a small bulge but no stenosis    L3-L4: There is mild left facet arthropathy but no stenosis    L4-L5: There is disc height loss with a small disc bulge. Facet arthropathy. No stenosis    L5-S1: There is disc height loss with a small disc bulge and left  paracentral disc extrusion extending inferiorly. This mildly distorts the  left S1 nerve root in the subarticular zone and left lateral recess. There  are facet degenerative changes with moderate left and mild-to-moderate right  foraminal narrowing    Impression  :  1. Multilevel degenerative change detailed by level above most significant  at L5-S1                Signing/Reading Doctor: Alison Ireland ([id number])  Approved: Alison Ireland ([id number])  May 23 2016  8:39AM        No results for input(s): CPK, TROIQ in the last 72 hours.     No lab exists for component: CKQMB, CPKMB, BMPP  Recent Labs     12/27/22  0407 12/26/22  0302    136   K 3.3* 3.0*   CL 97 97   CO2 30 26   BUN 71* 68*   CREA 2.82* 2.54*   * 106* PHOS 5.5* 5.4*   CA 10.1 9.2     Recent Labs     12/27/22  0407 12/26/22  0301   WBC 6.2 6.3   HGB 8.3* 7.2*   HCT 27.6* 24.8*    239     Recent Labs     12/27/22  0407 12/26/22  0302 12/25/22  1912   PTP  --   --  14.4*   INR  --   --  1.4*   * 139*  --        No results for input(s): CHOL, LDLC in the last 72 hours. No lab exists for component: TGL, HDLC,  HBA1C  No results for input(s): CRP, TSH, TSHEXT, TSHEXT in the last 72 hours.     No lab exists for component: ESR          Current meds:    Current Facility-Administered Medications:     acetaZOLAMIDE (DIAMOX) 500 mg in sterile water (preservative free) 5 mL injection, 500 mg, IntraVENous, Q12H, Hoang Albany, DO, 500 mg at 12/27/22 0924    heparin (porcine) injection 5,000 Units, 5,000 Units, SubCUTAneous, Q12H, Hoang Albany, DO, 5,000 Units at 12/27/22 1027    lidocaine 4 % patch 1 Patch, 1 Patch, TransDERmal, Q24H, Chris MOLINA MD, 1 Patch at 12/26/22 1703    bumetanide (BUMEX) 0.25 mg/mL infusion, 0-2 mg/hr, IntraVENous, TITRATE, Chris MOLINA MD, Last Rate: 4 mL/hr at 12/27/22 0817, 1 mg/hr at 12/27/22 0817    QUEtiapine (SEROquel) tablet 25 mg, 25 mg, Oral, QHS, Walker Aponte MD, 25 mg at 12/26/22 2209    albuterol-ipratropium (DUO-NEB) 2.5 MG-0.5 MG/3 ML, 3 mL, Nebulization, Q6H RT, Walker Aponte MD, 3 mL at 12/27/22 0812    DOBUTamine (DOBUTREX) 500 mg/250 mL (2,000 mcg/mL) infusion, 5 mcg/kg/min, IntraVENous, CONTINUOUS, Chris MOLINA MD, Last Rate: 8.5 mL/hr at 12/27/22 0817, 5 mcg/kg/min at 12/27/22 0817    balsam peru-castor oiL (VENELEX) ointment, , Topical, BID, Stormy Car MD, Given at 12/27/22 0829    insulin glargine (LANTUS) injection 8 Units, 8 Units, SubCUTAneous, DAILY, Chris MOLINA MD, 8 Units at 12/27/22 0824    NOREPINephrine (LEVOPHED) 8 mg in 5% dextrose 250mL (32 mcg/mL) infusion, 0.5-30 mcg/min, IntraVENous, TITRATE, Ayse Reynolds, Lolita Ramsay MD, Stopped at 12/24/22 2342    alteplase (CATHFLO) 1 mg in sterile water (preservative free) 1 mL injection, 1 mg, InterCATHeter, PRN, Ramos MOLINA MD    bacitracin 500 unit/gram packet 1 Packet, 1 Packet, Topical, PRN, Mikhail Ba MD    glucose chewable tablet 16 g, 4 Tablet, Oral, PRN, Ramos MOLINA MD    glucagon (GLUCAGEN) injection 1 mg, 1 mg, IntraMUSCular, PRN, Mikhail Ba MD    dextrose 10 % infusion 0-250 mL, 0-250 mL, IntraVENous, PRN, Mikhail Ba MD    insulin lispro (HUMALOG) injection, , SubCUTAneous, AC&HS, Mikhail Ba MD, 5 Units at 12/27/22 1159    dextrose 10 % infusion 0-250 mL, 0-250 mL, IntraVENous, PRN, Walker Rose MD    insulin lispro (HUMALOG) injection 3 Units, 0.05 Units/kg, SubCUTAneous, TID WITH MEALS, Ramos MOLINA MD, 3 Units at 12/24/22 1201    senna-docusate (PERICOLACE) 8.6-50 mg per tablet 1 Tablet, 1 Tablet, Oral, BID PRN, Mikhail Ba MD, 1 Tablet at 12/26/22 4906    rosuvastatin (CRESTOR) tablet 10 mg, 10 mg, Oral, QHS, Ramos MOLINA MD, 10 mg at 12/26/22 2209    bisacodyL (DULCOLAX) suppository 10 mg, 10 mg, Rectal, QHS PRN, Ramos MOLINA MD    sodium chloride (NS) flush 5-40 mL, 5-40 mL, IntraVENous, Q8H, Walker Aponte MD, 10 mL at 12/27/22 0555    sodium chloride (NS) flush 5-40 mL, 5-40 mL, IntraVENous, PRN, Ramos MOLINA MD    acetaminophen (TYLENOL) tablet 650 mg, 650 mg, Oral, Q6H PRN, 650 mg at 12/26/22 1714 **OR** acetaminophen (TYLENOL) suppository 650 mg, 650 mg, Rectal, Q6H PRN, Walker Rose MD    polyethylene glycol (MIRALAX) packet 17 g, 17 g, Oral, DAILY PRN, Mikhail aB MD, 17 g at 12/26/22 0649    ondansetron (ZOFRAN ODT) tablet 4 mg, 4 mg, Oral, Q8H PRN **OR** ondansetron (ZOFRAN) injection 4 mg, 4 mg, IntraVENous, Q6H PRN, Ramos MOLINA MD, 4 mg at 12/24/22 0849    [Held by provider] aspirin delayed-release tablet 81 mg, 81 mg, Oral, DAILY, [facility] MD FAVIOLA, 81 mg at 12/27/22 0825    [Held by provider] clopidogreL (PLAVIX) tablet 75 mg, 75 mg, Oral, DAILY, Meghann Grant MD, 75 mg at 12/27/22 0826    [Held by provider] empagliflozin (JARDIANCE) tablet 10 mg, 10 mg, Oral, DAILY, Meghann Grant MD    ergocalciferol capsule 50,000 Units, 50,000 Units, Oral, Q7D, Parvin SALMERON MD, 50,000 Units at 12/23/22 0923    ipratropium (ATROVENT) 21 mcg (0.03 %) nasal spray 2 Spray, 2 Spray, Both Nostrils, Q12H, Ranjit Pearson MD, 2 [place] at 12/27/22 0830    [Held by provider] ivabradine (CORLANOR) tablet 2.5 mg, 2.5 mg, Oral, BID WITH MEALS, Ranjit Pearson MD, 2.5 mg at 12/23/22 0850    tamsulosin (FLOMAX) capsule 0.4 mg, 0.4 mg, Oral, DAILY, Ranjit Pearson MD, 0.4 mg at 12/27/22 0826    sodium chloride (NS) flush 5-40 mL, 5-40 mL, IntraVENous, Q8H, Walker Aponte MD, 10 mL at 12/27/22 0555    sodium chloride (NS) flush 5-40 mL, 5-40 mL, IntraVENous, PRN, MD Jimi Starks MD  [facility] [address] [facility] [address]  [facility]  [phone]      Marc Sanders MD

## 2022-12-27 NOTE — CONSULTS
Seen and examined  Thanks for the consult  A/P:  CKD-3b  TANNER,in the setting of 1-urinary retention 2-poor hemodynamics 3-low EF at risk for CRS            Non oliguric,creat up  Urinary retention/hydro s/p donnelly  Cardiomyopathy/low EF  Anemia    Recom to decrease Bumex gtt to 0.5 mg /hr  Ongoing cardiac work up  Urine protein,creat  No acute indication for dialysis;will be a poor candidate  Discussed with him,family,ICU team

## 2022-12-27 NOTE — CONSULTS
[address]  Inpatient Progress Note      Patient name: Lucile Gosselin  Patient : 1950  Patient MRN: [id number]  Consulting MD: Aminta Ward MD  Date of service: 22    REASON FOR REFERRAL:  Management of acute on chronic systolic heart failure    Plan of Care:  71 y.o. male with hx ICM, CAD s/p CABG, admitted for Acute on Chronic HFrEF. Likely causes of worsening HF are untreated TRISTAN, amyloidosis, progression of ICM or myocarditis. Will order initial HF evaluation including PYP when patient is able to lay flat. Will continue inotropic support for cardiogenic shock    RECOMMENDATIONS:  Continue current medical therapy for heart failure  Hold GDMT due to cardiogenic shock  Continue Dobutamine gtt at 5mcg/kg/min- watch for increased ectopy  Continue Bumex gtt, dosing per nephrology, plan for neg 1-2L  Not on allopurinol or uloric, check uric acid level  No indication for systemic anticoagulation  ASA and plavix on hold ?   No statin due to transaminitis- improving   Keep K > 4 and Mag > 2  Transfuse to keep Hgb > 7  Pulmonary hygiene, wean O2 as able   TTE pending   Nicom daily   Labs: CBC, BMP, LFT, pro-NT-BNP, iron profile with ferritin, thyroid profile, vitamin D level,  gammopathy profile, ESR, CRP  Will need OP Sleep Study  Send genetic testing tomorrow   Appreciate nephrology and cardiology recommendations   Consider cMRI when able   Will order PYP when able to lay flat   Would hold off on biopsy at this time until inflammatory markers result  Biopsy would be high risk, steroids with increased risk with infection  Nutritionist consult  PT/OT  Heart failure education  Patient is currently a DNR, if changes to Full Code will need to consider lifevest at discharge   Plan at discharge: recommend flu and pneumonia vaccinations  All other care per primary team    IMPRESSION:  Cardiogenic shock- improving on inotropic support  Acute on chronic systolic heart failure  Stage D, NYHA class IV symptoms  Likely combination of ICM/NICM cardiomyopathy, LVEF 20%  Transaminitis  Acute on CKD, baseline creatinine 1.5-1.7  Volume overload  CAD s/p CABG x 2  Mild AS  PAD  Bilateral hydronephrosis s/p donnelly  DM2      LIFE GOALS:  Lifestyle goals reviewed with the patient. Patient's personal goals include: TBD  Important upcoming milestones or family events: TBD  The patient identifies the following as important for living well: TBD    INTERVAL HISTORY:    HPI:  71 y.o. male who was admitted via ED for worsening SOB, poor appetite, orthopnea and fatigue. Pmhx of CAD s/p CABG, PAD, DM 2, recent admission for HFmrEF earlier this month. Serial TTEs show progressive decline in EF since 3/2021 with the most recent EF at 25-30%. Recent LHC shows diffuse CAD and no interventions indicated. Patient had Gonzalo Thomson recently and there is some thought to myocarditis or other etiology causing worsening HF. [facility] was consulted for further evaluation and management of HFrEF. CARDIAC IMAGING:  Echo 12/26/22    Left Ventricle: Severely reduced left ventricular systolic function with a visually estimated EF of 25 - 30%. Left ventricle size is normal. Normal wall thickness. There are regional wall motion abnormalities. Grade II diastolic dysfunction with increased LAP. Right Ventricle: Moderately reduced systolic function. TAPSE is abnormal. TAPSE is 1.1 cm. Aortic Valve: Mild stenosis of the aortic valve. AV peak gradient is 13 mmHg. AV peak velocity is 1.8 m/s. Mitral Valve: Not well visualized. Moderate annular calcification at the posterior leaflet of the mitral valve. Mild to moderate regurgitation. Tricuspid Valve: Mildly elevated RVSP. Left Atrium: Left atrium is moderately dilated. 12/8/22    Left Ventricle: Moderately reduced left ventricular systolic function with a visually estimated EF of 35 - 40%.  Severe hypokinesis of the following segments: mid anteroseptal, apical anterior, apical septal, apical inferior and apical lateral. Severe hypokinesis of the apex. Mitral Valve: Severely thickened leaflet, at the anterior and posterior leaflets. Severely calcified leaflet, at the anterior and posterior leaflets. Mild annular calcification of the mitral valve. Moderate regurgitation. Left Atrium: Left atrium is mildly dilated. Contrast used: Definity. limited study    EKG 12/22/22 ST, Biatria enlargement, marked ST abnormality    LHC 12/6/22  1. Normal LVEDP  2. Severe native multivessel coronary artery disease  3. Patent LIMA to LAD and vein graft to distal RCA  4. Recurrent ISR in OM1 stent with now 60 to 70% restenosis  5. Recoil of left main and circumflex stent with now recurrent 40 to 50% stenosis. 6.  Progression of ostial left main disease now to about 60% stenosis  7. Progression of disease in jailed first marginal branch now with diffuse 90% stenosis  8. High-grade stenosis in the mid to distal right potential femoral artery treated with 6 x 40 mm impact drug-coated balloon angioplasty to reduce the stenosis to less than 40%    NST      HEMODYNAMICS:  RHC not done  CPEST too ill   6MW too ill    OTHER IMAGING:  CXR   XR Results (most recent):  Results from Hospital Encounter encounter on 12/22/22    XR CHEST PORT    Narrative  EXAM: XR CHEST PORT    DATE: 12/24/2022 6:04 PM    INDICATION: CVC placement    COMPARISON: Chest radiograph December 24, 2022 at 1412 hours    FINDINGS: AP portable chest radiograph. There is a new RIGHT IJ central venous  catheter with the tip near the cavoatrial junction. Patient is status post  sternotomy and CABG. The heart is mildly enlarged but stable. The lungs are  hyperinflated. There are persistent, small bilateral pleural effusions. There is  no pneumothorax. A skin fold projects over the RIGHT upper lung. The vascular  clarity is again mildly diminished. Impression  1. RIGHT IJ catheter is in satisfactory position with the tip at the cavoatrial  junction. No pneumothorax. 2. No change in small bilateral effusions. CT Results (most recent):  Results from Hospital Encounter encounter on 12/22/22    CT ABD PELV WO CONT    Narrative  EXAM: CT ABD PELV WO CONT    INDICATION: rlq abdominal pain    COMPARISON: 5/3/2014    IV CONTRAST: None. ORAL CONTRAST: None    TECHNIQUE:  Thin axial images were obtained through the abdomen and pelvis. Coronal and  sagittal reformats were generated. CT dose reduction was achieved through use of  a standardized protocol tailored for this examination and automatic exposure  control for dose modulation. The absence of intravenous contrast material reduces the sensitivity for  evaluation of the vasculature and solid organs. FINDINGS:  LOWER THORAX: Small bilateral pleural effusions. Partial collapse bases  LIVER: No mass. BILIARY TREE: Gallstones. No evidence of acute cholecystitis CBD is not dilated. SPLEEN: within normal limits. PANCREAS: No focal abnormality. ADRENALS: 19 mm right adrenal nodule unchanged,  KIDNEYS/URETERS: Mild bilateral hydronephrosis. No stone  STOMACH: Unremarkable. SMALL BOWEL: No dilatation or wall thickening. COLON: No dilatation or wall thickening. APPENDIX: Surgically absent  PERITONEUM: No ascites or pneumoperitoneum. RETROPERITONEUM: No lymphadenopathy or aortic aneurysm. Extensive vascular  calcifications  REPRODUCTIVE ORGANS: Mildly enlarged  URINARY BLADDER: Massive distention  BONES: No destructive bone lesion. ABDOMINAL WALL: Small umbilical hernia containing fat. ADDITIONAL COMMENTS: N/A    Impression  1. Bladder is massively distended with mild bilateral hydronephrosis. May  indicate bladder outlet obstruction. Prostate is mildly enlarged  2. Gallstones. No acute cholecystitis  3.  Small bilateral pleural effusions      PHYSICAL EXAM:  Visit Vitals  /65   Pulse (!) 107   Temp 97.5 °F (36.4 °C)   Resp 20   Ht 5' 9\" (1.753 m)   Wt 118 lb 9.7 oz (53.8 kg)   SpO2 100%   BMI 17.52 kg/m²     Physical Exam  Vitals and nursing note reviewed. Constitutional:       Appearance: He is underweight. He is ill-appearing. Cardiovascular:      Rate and Rhythm: Regular rhythm. Tachycardia present. Pulses: Normal pulses. Heart sounds: Normal heart sounds. Pulmonary:      Effort: No respiratory distress. Breath sounds: Normal breath sounds. Abdominal:      General: There is no distension. Palpations: Abdomen is soft. Musculoskeletal:         General: No swelling. Skin:     General: Skin is warm and dry. Neurological:      General: No focal deficit present. Mental Status: He is alert and oriented to person, place, and time. Psychiatric:         Mood and Affect: Mood normal.        REVIEW OF SYSTEMS:  Review of Systems   Constitutional:  Positive for malaise/fatigue. Negative for chills and fever. HENT:  Positive for hearing loss. Respiratory:  Positive for shortness of breath. Negative for cough and wheezing. Cardiovascular:  Negative for chest pain, palpitations and leg swelling. Gastrointestinal:  Negative for constipation, heartburn, nausea and vomiting. Poor appetite    Musculoskeletal:  Negative for falls and myalgias. Neurological:  Positive for weakness. Negative for dizziness and headaches. Psychiatric/Behavioral:  Negative for depression.         PAST MEDICAL HISTORY:  Past Medical History:   Diagnosis Date    CAD (coronary artery disease) 11/10/2016    NSTEMI & 2 stents    Deafness 10/28/2012    DM (diabetes mellitus) ([facility] [address]     Elevated cholesterol     Hypertension     NSTEMI (non-ST elevated myocardial infarction) ([facility] [address] 11/10/2016       PAST SURGICAL HISTORY:  Past Surgical History:   Procedure Laterality Date    COLONOSCOPY N/A 6/28/2018    COLONOSCOPY performed by Wai Lea MD at [address] PROCEDURE UNLIST  11/11/2016    2 stents       FAMILY HISTORY:  Family History   Problem Relation Age of Onset    Heart Disease Father     Heart Attack Father     Hypertension Mother     Elevated Lipids Brother     Elevated Lipids Brother     No Known Problems Sister     Elevated Lipids Brother     No Known Problems Son     No Known Problems Daughter     Anesth Problems Neg Hx        SOCIAL HISTORY:  Social History     Socioeconomic History    Marital status:    Tobacco Use    Smoking status: Never     Passive exposure: Never    Smokeless tobacco: Never   Vaping Use    Vaping Use: Never used   Substance and Sexual Activity    Alcohol use: Yes     Alcohol/week: 2.0 standard drinks     Types: 1 Cans of beer, 1 Shots of liquor per week     Comment: rarely    Drug use: No    Sexual activity: Yes     Social Determinants of Health     Financial Resource Strain: Medium Risk    Difficulty of Paying Living Expenses: Somewhat hard   Food Insecurity: Food Insecurity Present    Worried About Running Out of Food in the Last Year: Never true    Ran Out of Food in the Last Year: Often true       LABORATORY RESULTS:     Labs Latest Ref Rng & Units 12/27/2022 12/26/2022 12/25/2022 12/25/2022 12/24/2022 12/23/2022 12/22/2022   WBC 4.1 - 11.1 K/uL 6.2 6.3 - 5.9 15. 3(H) 18. 5(H) -   RBC 4.10 - 5.70 M/uL 3.68(L) 3.14(L) - 3.07(L) 3.63(L) 4.16 -   Hemoglobin 12.1 - 17.0 g/dL 8. 3(L) 7. 2(L) 7. 1(L) 7. 2(L) 8. 3(L) 9.8(L) -   Hematocrit 36.6 - 50.3 % 27. 6(L) 24. 8(L) - 24. 3(L) 27. 2(L) 32. 8(L) -   MCV 80.0 - 99.0 FL 75. 0(L) 79. 0(L) - 79. 2(L) 74. 9(L) 78. 8(L) -   Platelets 869 - 769 K/uL 259 239 - 214 343 418(H) -   Lymphocytes 12 - 49 % 12 10(L) - 17 6(L) 4(L) -   Monocytes 5 - 13 % 8 7 - 9 6 7 -   Eosinophils 0 - 7 % 4 2 - 4 1 0 -   Basophils 0 - 1 % 1 1 - 1 1 0 -   Albumin 3.5 - 5.0 g/dL 4.9 4.6 - 4.2 3. 3(L) 3.4(L) -   Calcium 8.5 - 10.1 MG/DL 10.1 9.2 - 8.9 9.9 9.3 9.7   Glucose 65 - 100 mg/dL 202(H) 106(H) - 88 157(H) 345(H) 371(H) BUN 6 - 20 MG/DL 71(H) 68(H) - 60(H) 60(H) 66(H) 64(H)   Creatinine 0.70 - 1.30 MG/DL 2.82(H) 2.54(H) - 2.10(H) 2.00(H) 2.49(H) 2.55(H)   Sodium 136 - 145 mmol/L 136 136 - 135(L) 135(L) 135(L) 136   Potassium 3.5 - 5.1 mmol/L 3. 3(L) 3.0(L) - 3.6 3.3(L) 4.2 4.7   TSH 0.36 - 3.74 uIU/mL - - - - - - -   Some recent data might be hidden     Lab Results   Component Value Date/Time    TSH 4.80 (H) 12/06/2022 03:53 AM    TSH 5.39 (H) 10/12/2022 09:10 AM    TSH 3.53 02/03/2022 11:47 AM    TSH 5.790 (H) 11/21/2019 04:45 PM    TSH 3.08 06/22/2018 01:53 PM    TSH 4.250 05/26/2015 09:43 AM       ALLERGY:  No Known Allergies     CURRENT MEDICATIONS:    Current Facility-Administered Medications:     acetaZOLAMIDE (DIAMOX) 500 mg in sterile water (preservative free) 5 mL injection, 500 mg, IntraVENous, Q12H, David Aguilar DO, 500 mg at 12/27/22 0924    heparin (porcine) injection 5,000 Units, 5,000 Units, SubCUTAneous, Q12H, Jenn Elliott DO, 5,000 Units at 12/27/22 1027    lidocaine 4 % patch 1 Patch, 1 Patch, TransDERmal, Q24H, Walker Aponte MD, 1 Patch at 12/26/22 1703    bumetanide (BUMEX) 0.25 mg/mL infusion, 0-2 mg/hr, IntraVENous, TITRATE, Ival Giulia MOLINA MD, Last Rate: 4 mL/hr at 12/27/22 0817, 1 mg/hr at 12/27/22 0817    QUEtiapine (SEROquel) tablet 25 mg, 25 mg, Oral, QHS, Walker Aponte MD, 25 mg at 12/26/22 2209    albuterol-ipratropium (DUO-NEB) 2.5 MG-0.5 MG/3 ML, 3 mL, Nebulization, Q6H RT, Walker Aponte MD, 3 mL at 12/27/22 0812    DOBUTamine (DOBUTREX) 500 mg/250 mL (2,000 mcg/mL) infusion, 5 mcg/kg/min, IntraVENous, CONTINUOUS, Ival Giulia MOLINA MD, Last Rate: 8.5 mL/hr at 12/27/22 0817, 5 mcg/kg/min at 12/27/22 0817    balsam peru-castor oiL (VENELEX) ointment, , Topical, BID, Keith Galvin MD, Given at 12/27/22 0829    insulin glargine (LANTUS) injection 8 Units, 8 Units, SubCUTAneous, DAILY, Mynor Green MD, 8 Units at 12/27/22 0824    NOREPINephrine (LEVOPHED) 8 mg in 5% dextrose 250mL (32 mcg/mL) infusion, 0.5-30 mcg/min, IntraVENous, TITRATE, Eloy Landau, MD, Stopped at 12/24/22 2342    alteplase (CATHFLO) 1 mg in sterile water (preservative free) 1 mL injection, 1 mg, InterCATHeter, PRN, Eloy Landau, MD    bacitracin 500 unit/gram packet 1 Packet, 1 Packet, Topical, PRN, Ralf MOLINA MD    glucose chewable tablet 16 g, 4 Tablet, Oral, PRN, Ralf MOLINA MD    glucagon (GLUCAGEN) injection 1 mg, 1 mg, IntraMUSCular, PRN, Eloy Landau, MD    dextrose 10 % infusion 0-250 mL, 0-250 mL, IntraVENous, PRN, Eloy Landau, MD    insulin lispro (HUMALOG) injection, , SubCUTAneous, AC&HS, Eloy Landau, MD, 5 Units at 12/27/22 1159    dextrose 10 % infusion 0-250 mL, 0-250 mL, IntraVENous, PRN, Stewart GritWalker MD    insulin lispro (HUMALOG) injection 3 Units, 0.05 Units/kg, SubCUTAneous, TID WITH MEALS, Ralf MOLINA MD, 3 Units at 12/24/22 1201    senna-docusate (PERICOLACE) 8.6-50 mg per tablet 1 Tablet, 1 Tablet, Oral, BID PRN, Eloy Landau, MD, 1 Tablet at 12/26/22 0532    rosuvastatin (CRESTOR) tablet 10 mg, 10 mg, Oral, QHS, Ralf MOLINA MD, 10 mg at 12/26/22 2209    bisacodyL (DULCOLAX) suppository 10 mg, 10 mg, Rectal, QHS PRN, Ralf MOLINA MD    sodium chloride (NS) flush 5-40 mL, 5-40 mL, IntraVENous, Q8H, Walker Aponte MD, 10 mL at 12/27/22 0555    sodium chloride (NS) flush 5-40 mL, 5-40 mL, IntraVENous, PRN, Ralf MOLINA MD    acetaminophen (TYLENOL) tablet 650 mg, 650 mg, Oral, Q6H PRN, 650 mg at 12/26/22 1714 **OR** acetaminophen (TYLENOL) suppository 650 mg, 650 mg, Rectal, Q6H PRN, Walker Harris MD    polyethylene glycol (MIRALAX) packet 17 g, 17 g, Oral, DAILY PRN, Eloy Landau, MD, 17 g at 12/26/22 0649    ondansetron (ZOFRAN ODT) tablet 4 mg, 4 mg, Oral, Q8H PRN **OR** ondansetron (ZOFRAN) injection 4 mg, 4 mg, IntraVENous, Q6H PRN, Jackson Monsalve MD, 4 mg at 12/24/22 0849    [Held by provider] aspirin delayed-release tablet 81 mg, 81 mg, Oral, DAILY, Ranjit Pearson MD, 81 mg at 12/27/22 0825    [Held by provider] clopidogreL (PLAVIX) tablet 75 mg, 75 mg, Oral, DAILY, Devyn Deras MD, 75 mg at 12/27/22 0826    [Held by provider] empagliflozin (JARDIANCE) tablet 10 mg, 10 mg, Oral, DAILY, Devyn Deras MD    ergocalciferol capsule 50,000 Units, 50,000 Units, Oral, Q7D, Devyn Deras MD, 50,000 Units at 12/23/22 0923    ipratropium (ATROVENT) 21 mcg (0.03 %) nasal spray 2 Spray, 2 Spray, Both Nostrils, Q12H, Ranjit Pearson MD, [address] [place] at 12/27/22 0830    [Held by provider] ivabradine (CORLANOR) tablet 2.5 mg, 2.5 mg, Oral, BID WITH MEALS, Ranjit Pearson MD, 2.5 mg at 12/23/22 0850    tamsulosin (FLOMAX) capsule 0.4 mg, 0.4 mg, Oral, DAILY, Ranjit Pearson MD, 0.4 mg at 12/27/22 5578    sodium chloride (NS) flush 5-40 mL, 5-40 mL, IntraVENous, Q8H, Walker Aponte MD, 10 mL at 12/27/22 0555    sodium chloride (NS) flush 5-40 mL, 5-40 mL, IntraVENous, PRN, Jackson Monsalve MD    PATIENT CARE TEAM:  Patient Care Team:  Chad Hill MD as PCP - General (Family Medicine)  Chad Hill MD as PCP - [facility]  Igor Gonzalez MD (Cardiovascular Disease Physician)  Latoya Latham MD (Gastroenterology)  Cuauhtemoc Diggs MD (Cardiothoracic Surgery)  Berry Lombard, MD (Cardiovascular Disease Physician)  Bibiana Buitrago MD (Nephrology)  Deepthi Thurman RN as Care Transitions Nurse  Paolo Velazquez MD (Pulmonary Disease)     Thank you for allowing me to participate in this patient's care.     Jie Ly NP   [address] [place] [address], [address]  Phone: [phone]

## 2022-12-27 NOTE — PROGRESS NOTES
CRITICAL CARE NOTE      Name: Raghu Shah   : 1950   MRN: [id number]   Date: 2022      Reason for ICU Admission: Cardiogenic shock     ICU PROBLEM LIST   Acute on chronic HFrEF (25-30%)  Acute hypoxic respiratory failure  TANNER on CKD3  Cardiogenic shock, resolving  Lactic acidosis  NSTEMI  CAD  DM  transaminitis    HISTORY OF PRESENT ILLNESS:   Pt is a 71 y.o M w/ PMH as noted above who presented to ED due to progressive dyspnea. History obtained from pt and spouse at bedside. Onset 3-4 days ago, DONALDSON, abdominal discomfort and distension with associated anorexia not improved by increasing home lasix dose. States difficulty voiding despite lasix doses. Of note pt w/ recent frequent admissions for HFrEF. Denies sick contacts, CP, palpitations,     Pt tachycardic, hypotensive, hypoxemic on presentation. Labs w/ troponemia, BNP above baseline, elevated lactate, and TANNER. Imaging w/ bilateral pulmonary edema, bl pulmonary effusions, bladder distension 2/2 BPH and hydronephrosis. Pt pan cultured, given dose of abx and dose of IVP lasix. Pt placed on BiPAP w/ improvement in oxygenation. Vasquez placed w/ bladder decompression. Pt initially admitted to hospitalist service, however CCM consulted due to worsening clinical status. Admitted to CCU. Respiratory status improved with diuresis, weaned vasopressors. 24 HOUR EVENTS:   Pt w/ decreased UOP not responsive to IVP diuresis, started on bumex and dobutamine gtt with improvement in UOP and BP. Formal ECHO today, and reach out to cardiology as pt with stepwise with multiple admissions this year. May require home inotrope therapy.     NEUROLOGICAL:    - Mentation appropriate  - Monitor for acute change  Seroquel 25mg qhs    PULMONOLOGY:   - O2 per NC PRN to maintain spO2 >92%  - Suspect may require home O2 as becomes hypoxemic w/ activity  - Monitor bl effusions  - Pt states wish to be DNI    CARDIOVASCULAR:   - C/w dobutamine and bumex gtt  - Trend CVP, UOP. Monitor for evidence of hypoperfusion  - TTE 12/27 25% with LAD, reduce TAPSE 1.1 cm, mild MR,   - will reach to pt's usual cardiologist   - Suspect initial presentation was cardiorenal syndrome 2/2 post obstructive renal failure, now with likely ATN from hypotension and infection. Goal to wean off dobutamine, however given stepwise decline this year may require home inotropes.   - Holding GDMT due to hypotension  - ASA, plavix, statin    GASTROINTESTINAL:   - Cardiac, diabetic diet  - Bowel regimen for constipation  - shock liver, trend    RENAL/ELECTROLYTE/FLUIDS:   - Strict I/Os  - Suspect initially post obstructive failure, now Cr uptrending w/ likely ATN  - continue bumex as above, start diamox to support diuresis  - per Urology consult for obstruction, donnelly to remain in place for at least 7-10 days  -  w/ resolving hydronephrosis  - C/w flomax    ENDOCRINE:   - Hold PO anti-glycemic  - SSI, basal PRN   - Glucose goal 120-180    HEMATOLOGY/ONCOLOGY:   Change to hep ppx  AoCD     ID/MICRO:   UA negative - + blood, LE  Urine Cx negative -- stop abx, completed 5 d zosyn    PCT neg  ICU DAILY CHECKLIST     Code Status:DNR/DNI  DVT Prophylaxis: antoni  T/L/D: PIV, donnelly  SUP: N/A  Diet: cardiac, diabetic  Activity Level:ad jose f  ABCDEF Bundle/Checklist Completed:Yes  Disposition: Stay in ICU  Multidisciplinary Rounds Completed:yes  Patient/Family Updated: Yes    Tatiana   As noted above    SUBJECTIVE:   As noted above    Review of Systems:       OBJECTIVE:     Labs and Data: Reviewed 12/27/22  Medications: Reviewed 12/27/22  Imaging: Reviewed 12/27/22    General - in bed, chornically ill, edematous  HEENT- pupils equal, EOMI, anicteric  Neuro - follows basic commands, no focal deficit  CV - RRR  Resp - CTAB, shallow lung volumes  Abd - soft, nontender, no guarding   - + donnelly  MSK- intact, no sacral ulcer       Visit Vitals  /64 (BP 1 Location: Left upper arm, BP Patient Position: At rest)   Pulse 100   Temp 97.5 °F (36.4 °C)   Resp 19   Ht 5' 9\" (1.753 m)   Wt 53.8 kg (118 lb 9.7 oz)   SpO2 100%   BMI 17.52 kg/m²    O2 Flow Rate (L/min): 2 l/min O2 Device: Nasal cannula Temp (24hrs), Av.5 °F (36.9 °C), Min:97.2 °F (36.2 °C), Max:100 °F (37.8 °C)    CVP (mmHg): 7 mmHg (22 0500)      Intake/Output:     Intake/Output Summary (Last 24 hours) at 2022 0835  Last data filed at 2022 0700  Gross per 24 hour   Intake 1132.32 ml   Output 4150 ml   Net -3017.68 ml         Imaging    22    ECHO ADULT FOLLOW-UP OR LIMITED 2022    Interpretation Summary    Left Ventricle: Severely reduced left ventricular systolic function with a visually estimated EF of 25 - 30%. Not well visualized. Left ventricle size is normal. Normal wall thickness. Normal wall motion. Normal diastolic function. Mitral Valve: Thickened leaflet. Moderate annular calcification of the mitral valve. Mild regurgitation. Contrast used: Definity. Note: image quality is poor, and even with Definity contrast, EF is very difficult to estimate, and the reported figure is approximate at best. Consider alternative imaging modalities such as MUGA or cardiac MRI to more accurately assess. Signed by: Chino Jean Baptiste MD on 2022  4:46 PM           CRITICAL CARE DOCUMENTATION  I had a face to face encounter with the patient, reviewed and interpreted patient data including clinical events, labs, images, vital signs, I/O's, and examined patient. I have discussed the case and the plan and management of the patient's care with the consulting services, the bedside nurses and the respiratory therapist.      NOTE OF PERSONAL INVOLVEMENT IN CARE   This patient has a high probability of imminent, clinically significant deterioration, which requires the highest level of preparedness to intervene urgently.  I participated in the decision-making and personally managed or directed the management of the following life and organ supporting interventions that required my frequent assessment to treat or prevent imminent deterioration. I personally spent 30 minutes of critical care time. This is time spent at this critically ill patient's bedside actively involved in patient care as well as the coordination of care. This does not include any procedural time which has been billed separately.     Sonja Phillips DO  Staff Intensivist  [facility]  12/27/2022

## 2022-12-27 NOTE — TELEPHONE ENCOUNTER
Pt's wife Enoch,Susanna [facility]) said the Pt is in the hos and requested callback from Nayan.        BCB# [phone]

## 2022-12-27 NOTE — PROGRESS NOTES
Problem: Self Care Deficits Care Plan (Adult)  Goal: *Acute Goals and Plan of Care (Insert Text)  Description: FUNCTIONAL STATUS PRIOR TO ADMISSION: Patient was independent and active without use of DME. 2nd readmission in past month. Was completing ADLs and IADls without difficulty, went home on RA. Reports having difficulty doing stairs 2/2 increased SOB resulting in readmission. HOME SUPPORT: The patient lived with spouse but did not require assist.    Occupational Therapy Goals  Initiated 12/24/2022  1. Patient will perform upper body dressing with supervision/set-up within 7 day(s). 2.  Patient will perform lower body dressing with supervision/set-up within 7 day(s). 3.  Patient will perform bathing with supervision/set-up within 7 day(s). 4.  Patient will perform toilet transfers with supervision/set-up within 7 day(s). 5.  Patient will perform all aspects of toileting with supervision/set-up within 7 day(s). 6.  Patient will participate in upper extremity therapeutic exercise/activities with supervision/set-up for 5 minutes within 7 day(s). 7.  Patient will utilize energy conservation techniques during functional activities with verbal cues within 7 day(s). Outcome: Progressing Towards Goal    OCCUPATIONAL THERAPY TREATMENT  Patient: Destiny Hernandez (76 y.o. male)  Date: 12/27/2022  Diagnosis: [facility] [A41.9] <principal problem not specified>      Precautions: DNR, DNI, Fall  Chart, occupational therapy assessment, plan of care, and goals were reviewed. ASSESSMENT  Patient continues with skilled OT services and is progressing towards goals. Session limited by increased pain, generalized weakness, and fatigue with all activity. Patient agreeable to OT session however endorsing increased BLE pain and poor sleep overnight. Patient requiring max A to complete bed mobility, requiring increased time for rest breaks 2/2 fatigue and SOB.  Attempted standing 2x during session, patient requiring up to max A to achieve backside clearance from bed. Patient unable to attempt side stepping toward [address] 2/2 impaired balance, patient with heavy posterior BLE lean on bed as well to steady. HR elevating to 120 bpm, RR elevating briefly to 30s with quick recovery. Patient returned to supine in bed and repositioned in chair position, able to engage in seated grooming tasks with GUILLORY/SPV. Throughout session patient endorsing concern with returning home due to increased weakness and stiffness limiting independence levels. Patient is currently requiring up to max A for transfers and up to max A for ADL tasks. Recommend SNF at [place] as patient is high fall risk and well below baseline LOF. Current Level of Function Impacting Discharge (ADLs): max A for LB dressing,     Other factors to consider for discharge: below baseline, readmission         PLAN :  Patient continues to benefit from skilled intervention to address the above impairments. Continue treatment per established plan of care to address goals. Recommend with staff: chair position for meals    Recommend next OT session: standing grooming    Recommendation for discharge: (in order for the patient to meet his/her long term goals)  Therapy up to 5 days/week in SNF setting    This discharge recommendation:  Has been made in collaboration with the attending provider and/or case management    IF patient discharges home will need the following DME: TBD       SUBJECTIVE:   Patient stated I am so weak, it scares me.     OBJECTIVE DATA SUMMARY:   Cognitive/Behavioral Status:  Neurologic State: Alert  Orientation Level: Oriented X4  Cognition: Appropriate decision making; Appropriate for age attention/concentration; Appropriate safety awareness             Functional Mobility and Transfers for ADLs:  Bed Mobility:  Rolling: Moderate assistance  Supine to Sit: Moderate assistance  Sit to Supine:  Moderate assistance  Scooting: Maximum assistance    Transfers:  Sit to Stand: Maximum assistance          Balance:  Sitting: Impaired  Sitting - Static: Fair (occasional)  Sitting - Dynamic: Fair (occasional)  Standing: Impaired  Standing - Static: Fair;Constant support  Standing - Dynamic : Fair;Constant support    ADL Intervention:       Grooming  Position Performed: Other (comment) (bed in chair position)  Washing Face: Set-up; Supervision         Type of Bath: Chlorhexidine (CHG)              Lower Body Dressing Assistance  Socks: Maximum assistance    Toileting  Toileting Assistance: Total assistance(dependent)  Bowel Hygiene: Total assistance (dependent)       Pain:  Pt endorsing increased pain and stiffness in BLEs, pillows provided for support. RN notified. Activity Tolerance:   Fair, requires frequent rest breaks, and observed SOB with activity    After treatment patient left in no apparent distress:   Supine in bed, Call bell within reach, Side rails x 3, and bed in chair position    COMMUNICATION/COLLABORATION:   The patients plan of care was discussed with: Physical therapist, Occupational therapist, and Registered nurse.      Kari Villanueva OT  Time Calculation: 34 mins

## 2022-12-27 NOTE — PROGRESS NOTES
Verbal bedside shift change report given to Stacy Breen (oncoming nurse) by Brigida El (offgoing nurse). Report included the following information SBAR, Kardex, Intake/Output, MAR, Recent Results, Cardiac Rhythm Sinus Tachy, and Alarm Parameters.       [address] to remove central line per intensivist  0600 K 3.3, 40 mEq potassium PO ordered per intensivist    0730 Report to [facility]

## 2022-12-27 NOTE — TELEPHONE ENCOUNTER
Returned call to pt's wife Radha Mcclellan as per PHI. Pt's name and  verified. Attempted to inform wife that Mindy Leon is back in office and we would like to schedule a virtual visit for Thursday but wife stated that pt has been in ICU since Thursday and would like to speak to Mindy Leon today even if its at the end of the day.

## 2022-12-27 NOTE — PROGRESS NOTES
0730- [facility] awake. Declines breakfast due to poor appetite. 1000- Family is at the bedside. 1200- Uneventful morning. 1430- Patient has worked with OT. Some dyspnea noted. Did not tolerate standing. See OT note. [id number]- Nephrology meeting with patient and family. 1630- Nicom information CO-3.6   CI-2.2  1645- Nicom information CO-3.4   CI-2.1  1930- Uneventful evening. Report given to [facility].

## 2022-12-27 NOTE — CONSULTS
[address]    Name:  Arnold Mcgarry  MR#:  [id number]  :  1950  ACCOUNT #:  [de-identified]  DATE OF SERVICE:  2022      REASON FOR CONSULTATION:  Seen for renal failure. Thanks for the consult. HISTORY OF PRESENT ILLNESS:  He used to see Dr. Kell Love before, known to have chronic kidney disease. His baseline creatinine is between 1.5 and 1.7. Recent active issues are cardiac issues. He has developed a lower EF and RV failure, ongoing workup for heart failure. He had to have dobutamine and Bumex drip and since that started couple of days ago, the patient's urine output improved and he made close to 4.5 liters of urine the last couple of days and his weight dropped by 3 kg. PAST MEDICAL HISTORY:  CKD stage IIIB, hypertension, urinary retention with bilateral obstruction cardiomyopathy, CAD. ALLERGIES:  NO KNOWN ALLERGIES. MEDICATIONS AS INPATIENT:  1.  Diamox. 2.  Jardiance. 3.  Lantus. 4.  Crestor 10.  5.  Bumex. 6.  Dobutamine. 7.  Levophed. SOCIAL HISTORY:  Reviewed. FAMILY HISTORY:  Reviewed. CODE STATUS:  DNR. REVIEW OF SYSTEMS:  Look at the HPI, rest is negative. PHYSICAL EXAMINATION:  VITAL SIGNS:  /67, saturating 99% on 2 liters nasal cannula. Urine output 1.1 liters. Tachycardic. NECK:  JVD is difficult to assess. LUNGS:  Distant breath sounds. ABDOMEN:  Soft, distended. EXTREMITIES:  No edema. LABORATORY DATA:  Hemoglobin 8.3, platelets 574, WBC 6.2. Potassium 3.3, BUN 71, creatinine is 2.8, calcium is 10.1. Albumin is 4.9. Phosphorus is 5.5. AST, ALT improving. UA, +2 protein, ++ sugar, 5-10 wbc's, 5-10 rbc's. IMAGING STUDIES:  CT scan:  Distended bladder, bilateral hydronephrosis, Vasquez inserted. This on 2022. Ultrasound on 2022 showed right kidney 9.1, left 10.2. No hydronephrosis. IMPRESSION:  1. Chronic kidney disease stage IIIB.   2.  Acute kidney injury in the setting of urine retention, low ejection fraction, poor hemodynamics. Initial suspicion was the patient has obstruction, but did not improve after relief from the obstruction. Suspect a component of cardiorenal syndrome now. 3.  Cardiomyopathy, ongoing workup. 4. Low ejection fraction. 5.  Urinary retention/hydronephrosis, status post Vasquez catheter insertion. RECOMMENDATIONS:  1. Recommend decrease Bumex drip to 0.5 mg per hour. 2.  Ongoing workup for his cardiomyopathy. 3.  No acute indication for dialysis. He will be a poor candidate for it. 4.  Discussed in length with him and his family.       Terry Coombs MD      [id number]  D:  12/27/2022 16:02  T:  12/27/2022 17:11  JOB #:  [id number]

## 2022-12-27 NOTE — DIABETES MGMT
[facility]  DIABETES MANAGEMENT CONSULT    Consulted by  Nga Mensah MD   for advanced nursing evaluation and care for inpatient blood glucose management. Evaluation and Action Plan   Minna Acosta is a 71year old gentleman, with Type 2 Diabetes with a recent A1C of 6.5%, who was admitted with acute hypoxic respiratory s/t acute on chronic HFrEF and TANNER. He was admitted 2 weeks ago for similar complaint and antihyperglycemic agents were adjusted on discharge. Metformin was stopped due to decline in GFR and Jardiance switched to [place] (unclear why). He was compliant with his [place], [place] and Glipizide up until day prior to this hospitalization. His glucose was significantly elevated at 458 on admission, s/t insulin resistance from acute HFrEF, elevated lactate and impaired perfusion. GFR is 27 and would avoid therefore avoid GUILLORY. Please start basal/bolus insulin therapy at this time and target an inpatient BG goal of 140-180mg/dl. BG trends since admission have fluctuated and required reducing basal insulin due to lower normal BG in 80s. Noted prandial hyperglycemia in past 24h-Fasting ; Would adjust basal back to 12 units daily-PO intake poor today per notes    Management Rationale Action Plan   Medication   Basal needs Using low dose 0.2 units/kg/D based on low BMI Resume 12 units Lantus daily (as used with good glucose impact last admission)    If Fasting BG sustained over 180mgl/dl, increase to 0.3 units/kg/day. Reduce dose by 20% if NPO   Nutritional needs Using low sensitivity based on low BMI Start low dose humalog 0.05 units/kg/meal  3 units Humalog/meal    Hold if patient is NPO or consumes less than 50% of carbohydrates on meal tray    Advance by 2 units daily for persistent pre-prandial hyperglycemia     Corrective insulin Using normal sensitivity based on  Normal Sensitivity ACHS   Additional Recommendations.   POC glucose ACHS    Consistent carbohydrate diet (60 grams CHO/meal). Patient interested in Glucerna    3. Please hold oral antihyperglycemic agents in the inpatient setting,      Will see again Tuesday if here. Please PerfectServe with questions over the holiday weekend            Initial Presentation   Anival Trimble is a 71 y.o. male who presented to the ED 12/22/22 with a 3-4 day c/o gradually worsening dyspnea, fatigue, chills, constipation and RLQ pain. He endorses difficulty voiding despite compliance with lasix. In the ED, he was hypoxic and started on supplemental O2 and diuresis. LAB: WBC 25.1, , GFR 25, Lac 5.2, Trop 1096, BNP 6905  CXR: Widespread interstitial opacities bilateral mid to lower lung opacities  concerning for edema and/or atypical infection. Small bilateral pleural  effusions. CT: CT revealed distended bladder  EKG: Sinus tachycardia   Biatrial enlargement   Rightward axis   Marked ST abnormality, possible inferior subendocardial injury   Marked ST abnormality, possible lateral subendocardial injury   Abnormal ECG   Did have a hospital admission for HFrEF 12/11/22-12/16/22    HX:   Past Medical History:   Diagnosis Date    CAD (coronary artery disease) 11/10/2016    NSTEMI & 2 stents    Deafness 10/28/2012    DM (diabetes mellitus) ([facility]     Elevated cholesterol     Hypertension     NSTEMI (non-ST elevated myocardial infarction) ([facility] 11/10/2016    CKD  CAD with CABG 2018    INITIAL DX:   Sepsis ([facility] [A41.9]     Current Treatment     TX: IVF, Diuresis, Supplemental O2    Hospital Course   Clinical progress has been complicated by:   19/14: Initial admission with hospitalist service but with progressive hypoxia early requiring CCU transfer. Elevated troponin/BNP. BPH and hydronephrosis. Pt pan cultured, given dose of abx and dose of IVP lasix. Pt placed on BiPAP w/ improvement in oxygenation.     12/27: cardiology suggesting myocarditis as reason for cardiomyopathy systolic HF>   Diabetes History   Type 2 Diabetes  Ambulatory BG management provided by: PCP Pedrito Parekh MD    Diabetes-related Medical History  Acute complications  Acute hyperglycemia  Neurological complications  Peripheral neuropathy  Microvascular disease  Nephropathy  Macrovascular disease  CAD  Other associated conditions     CHF    Diabetes Medication History  Key Antihyperglycemic Medications               dapagliflozin (FARXIGA) 10 mg tab tablet (Taking) Take 1 Tablet by mouth daily. glipiZIDE (GLUCOTROL) 5 mg tablet (Taking) Take 1 tablet by mouth twice daily    Januvia 50 mg tablet (Taking) Take 1 tablet by mouth once daily             Diabetes self-management practices:   Eating pattern   Eats 3 small meals daily  [x] Breakfast  2 Eggs, Coffee  [x] Lunch   Kersey  [x] Dinner   \" Food\" Bread, rice, lentils   [x] Bedtime  [place]  [x] Snacks   Afternoon snack  [x] Beverages  Water, Coffee  Physical activity pattern   Sedentary   Monitoring pattern  Does not check BG  Taking medications pattern  [x] Consistent administration  [x] Affordable  Social determinants of health impacting diabetes self-management practices   Concerned that you need to know more about how to stay healthy with diabetes  Overall evaluation:    [x] Achieving A1c target with drug therapy & self-care practices    Subjective   I took my medicine yesterday.      Objective   Physical exam  General Underweight male in mild distress/ill-appearing. Conversant and cooperative  Neuro  Alert, oriented   Vital Signs Visit Vitals  /67   Pulse (!) 106   Temp 99 °F (37.2 °C)   Resp 23   Ht 5' 9\" (1.753 m)   Wt 53.8 kg (118 lb 9.7 oz)   SpO2 99%   BMI 17.52 kg/m²     Skin  Warm and dry. Acanthosis noted along neckline. No lipohypertrophy or lipoatrophy noted at injection sites   Heart   Regular rate and rhythm.  No murmurs, rubs or gallops  Lungs  Clear to auscultation without rales or rhonchi  Extremities No foot wounds        Laboratory  Recent Labs 22  0407 22  0302 22  0301 22  0443   * 106*  --  88   AGAP 9 13  --  10   WBC 6.2  --  6.3 5.9   CREA 2.82* 2.54*  --  2.10*   * 1,342*  --  56*   * 634*  --  46         Factors impacting BG management  Factor Dose Comments   Nutrition:  Standard meals     60 grams/meal      Drugs:  Vasopressor load   Levophed  Affects insulin delivery     Heart Failure EF 20-30%         Other:   Kidney function GFR 27      Blood glucose pattern    Significant diabetes-related events over the past 24-72 hours  A1C  6.5% 22 (down from 7.0 10/12/22)  Fasting B  Pre-prandial: >200  Basal: Lantus 8units  Bolus: 3 units TID  Correction: normal sensitivity    Last admission was on Jardiance 10, alogliptin 12.5, 12 units Lantus, correctional humalog- experienced pre-prandial hyperglycemia.       Assessment and Nursing Intervention   Nursing Diagnosis Risk for unstable blood glucose pattern   Nursing Intervention Domain 5250 Decision-making Support   Nursing Interventions Examined current inpatient diabetes/blood glucose control   Explored factors facilitating and impeding inpatient management  Explored corrective strategies with patient and responsible inpatient provider   Informed patient of rational for insulin strategy while hospitalized     Nursing Diagnosis 51084 Ineffective Health Management   Nursing Intervention Domain 5250 Decision-making Support   Nursing Interventions Identified diabetes self-management practices impeding diabetes control  Discussed diabetes survival skills related to  Healthy Plate eating plan; given handouts  Role of physical activity in improving insulin sensitivity and action  Procedure for blood glucose monitoring & options for low-cost products  Medications plan at discharge     Billing Code(s)   [x] [phone]    Before making these care recommendations, I personally reviewed the hospitalization record, including notes, laboratory & diagnostic data and current medications, and examined the patient at the bedside (circumstances permitting) before making care recommendations. More than fifty (50) percent of the time was spent in patient counseling and/or care coordination.   Total minutes: [address] [facility]  Diabetes Clinical Nurse Specialist  Program for Diabetes Health  Access via [address] [place] [address]

## 2022-12-28 LAB
ALBUMIN SERPL-MCNC: 4.7 G/DL (ref 3.5–5)
ALBUMIN SERPL-MCNC: 4.8 G/DL (ref 3.5–5)
ALBUMIN/GLOB SERPL: 1.4 (ref 1.1–2.2)
ALBUMIN/GLOB SERPL: 1.5 (ref 1.1–2.2)
ALP SERPL-CCNC: 185 U/L (ref 45–117)
ALP SERPL-CCNC: 202 U/L (ref 45–117)
ALT SERPL-CCNC: 466 U/L (ref 12–78)
ALT SERPL-CCNC: 568 U/L (ref 12–78)
ANA TITR SER IF: NEGATIVE
ANION GAP SERPL CALC-SCNC: 12 MMOL/L (ref 5–15)
ANION GAP SERPL CALC-SCNC: 13 MMOL/L (ref 5–15)
AST SERPL-CCNC: 152 U/L (ref 15–37)
AST SERPL-CCNC: 258 U/L (ref 15–37)
BASOPHILS # BLD: 0.1 K/UL (ref 0–0.1)
BASOPHILS NFR BLD: 1 % (ref 0–1)
BILIRUB SERPL-MCNC: 0.8 MG/DL (ref 0.2–1)
BILIRUB SERPL-MCNC: 1 MG/DL (ref 0.2–1)
BUN SERPL-MCNC: 74 MG/DL (ref 6–20)
BUN SERPL-MCNC: 75 MG/DL (ref 6–20)
BUN/CREAT SERPL: 27 (ref 12–20)
BUN/CREAT SERPL: 27 (ref 12–20)
CALCIUM SERPL-MCNC: 10.5 MG/DL (ref 8.5–10.1)
CALCIUM SERPL-MCNC: 10.5 MG/DL (ref 8.5–10.1)
CHLORIDE SERPL-SCNC: 91 MMOL/L (ref 97–108)
CHLORIDE SERPL-SCNC: 92 MMOL/L (ref 97–108)
CMV IGG SERPL IA-ACNC: 5.1 U/ML (ref 0–0.59)
CMV IGM SERPL IA-ACNC: <30 AU/ML (ref 0–29.9)
CO2 SERPL-SCNC: 28 MMOL/L (ref 21–32)
CO2 SERPL-SCNC: 28 MMOL/L (ref 21–32)
COXSACKIE A7 IGM, 163291: NEGATIVE TITER
CREAT SERPL-MCNC: 2.76 MG/DL (ref 0.7–1.3)
CREAT SERPL-MCNC: 2.77 MG/DL (ref 0.7–1.3)
CV A16 IGG TITR SER IF: NEGATIVE TITER
CV A16 IGM TITR SER IF: NEGATIVE TITER
CV A24 IGG TITR SER IF: NEGATIVE TITER
CV A24 IGM TITR SER IF: NEGATIVE TITER
CV A7 IGG TITR SER IF: NEGATIVE TITER
CV A9 IGG TITR SER IF: NEGATIVE TITER
CV A9 IGM TITR SER IF: NEGATIVE TITER
DIFFERENTIAL METHOD BLD: ABNORMAL
EOSINOPHIL # BLD: 0.4 K/UL (ref 0–0.4)
EOSINOPHIL NFR BLD: 4 % (ref 0–7)
ERYTHROCYTE [DISTWIDTH] IN BLOOD BY AUTOMATED COUNT: 19.3 % (ref 11.5–14.5)
GLOBULIN SER CALC-MCNC: 3.3 G/DL (ref 2–4)
GLOBULIN SER CALC-MCNC: 3.4 G/DL (ref 2–4)
GLUCOSE BLD STRIP.AUTO-MCNC: 237 MG/DL (ref 65–117)
GLUCOSE BLD STRIP.AUTO-MCNC: 242 MG/DL (ref 65–117)
GLUCOSE BLD STRIP.AUTO-MCNC: 341 MG/DL (ref 65–117)
GLUCOSE BLD STRIP.AUTO-MCNC: 358 MG/DL (ref 65–117)
GLUCOSE BLD STRIP.AUTO-MCNC: 431 MG/DL (ref 65–117)
GLUCOSE BLD STRIP.AUTO-MCNC: 450 MG/DL (ref 65–117)
GLUCOSE BLD STRIP.AUTO-MCNC: 494 MG/DL (ref 65–117)
GLUCOSE SERPL-MCNC: 196 MG/DL (ref 65–100)
GLUCOSE SERPL-MCNC: 237 MG/DL (ref 65–100)
HCT VFR BLD AUTO: 32.6 % (ref 36.6–50.3)
HGB BLD-MCNC: 9.7 G/DL (ref 12.1–17)
IMM GRANULOCYTES # BLD AUTO: 0 K/UL (ref 0–0.04)
IMM GRANULOCYTES NFR BLD AUTO: 0 % (ref 0–0.5)
LYMPHOCYTES # BLD: 1 K/UL (ref 0.8–3.5)
LYMPHOCYTES NFR BLD: 11 % (ref 12–49)
MAGNESIUM SERPL-MCNC: 2.7 MG/DL (ref 1.6–2.4)
MCH RBC QN AUTO: 23.3 PG (ref 26–34)
MCHC RBC AUTO-ENTMCNC: 29.8 G/DL (ref 30–36.5)
MCV RBC AUTO: 78.4 FL (ref 80–99)
MONOCYTES # BLD: 0.9 K/UL (ref 0–1)
MONOCYTES NFR BLD: 10 % (ref 5–13)
NEUTS SEG # BLD: 6.9 K/UL (ref 1.8–8)
NEUTS SEG NFR BLD: 74 % (ref 32–75)
NRBC # BLD: 0 K/UL (ref 0–0.01)
NRBC BLD-RTO: 0 PER 100 WBC
PHOSPHATE SERPL-MCNC: 6.5 MG/DL (ref 2.6–4.7)
PLATELET # BLD AUTO: 309 K/UL (ref 150–400)
PMV BLD AUTO: 10.9 FL (ref 8.9–12.9)
POTASSIUM SERPL-SCNC: 3.3 MMOL/L (ref 3.5–5.1)
POTASSIUM SERPL-SCNC: 4 MMOL/L (ref 3.5–5.1)
PROCALCITONIN SERPL-MCNC: 0.44 NG/ML
PROT SERPL-MCNC: 8.1 G/DL (ref 6.4–8.2)
PROT SERPL-MCNC: 8.1 G/DL (ref 6.4–8.2)
RBC # BLD AUTO: 4.16 M/UL (ref 4.1–5.7)
SERVICE CMNT-IMP: ABNORMAL
SODIUM SERPL-SCNC: 131 MMOL/L (ref 136–145)
SODIUM SERPL-SCNC: 133 MMOL/L (ref 136–145)
WBC # BLD AUTO: 9.3 K/UL (ref 4.1–11.1)

## 2022-12-28 PROCEDURE — 99233 SBSQ HOSP IP/OBS HIGH 50: CPT | Performed by: NURSE PRACTITIONER

## 2022-12-28 PROCEDURE — 97530 THERAPEUTIC ACTIVITIES: CPT

## 2022-12-28 PROCEDURE — 83735 ASSAY OF MAGNESIUM: CPT

## 2022-12-28 PROCEDURE — 80053 COMPREHEN METABOLIC PANEL: CPT

## 2022-12-28 PROCEDURE — 85025 COMPLETE CBC W/AUTO DIFF WBC: CPT

## 2022-12-28 PROCEDURE — 74011250636 HC RX REV CODE- 250/636: Performed by: NURSE PRACTITIONER

## 2022-12-28 PROCEDURE — 84100 ASSAY OF PHOSPHORUS: CPT

## 2022-12-28 PROCEDURE — 74011636637 HC RX REV CODE- 636/637: Performed by: STUDENT IN AN ORGANIZED HEALTH CARE EDUCATION/TRAINING PROGRAM

## 2022-12-28 PROCEDURE — 84145 PROCALCITONIN (PCT): CPT

## 2022-12-28 PROCEDURE — 74011000250 HC RX REV CODE- 250: Performed by: STUDENT IN AN ORGANIZED HEALTH CARE EDUCATION/TRAINING PROGRAM

## 2022-12-28 PROCEDURE — 36415 COLL VENOUS BLD VENIPUNCTURE: CPT

## 2022-12-28 PROCEDURE — 94640 AIRWAY INHALATION TREATMENT: CPT

## 2022-12-28 PROCEDURE — 74011250637 HC RX REV CODE- 250/637: Performed by: FAMILY MEDICINE

## 2022-12-28 PROCEDURE — 87798 DETECT AGENT NOS DNA AMP: CPT

## 2022-12-28 PROCEDURE — 82962 GLUCOSE BLOOD TEST: CPT

## 2022-12-28 PROCEDURE — 99233 SBSQ HOSP IP/OBS HIGH 50: CPT | Performed by: INTERNAL MEDICINE

## 2022-12-28 PROCEDURE — 74011000258 HC RX REV CODE- 258: Performed by: NURSE PRACTITIONER

## 2022-12-28 PROCEDURE — 74011250637 HC RX REV CODE- 250/637: Performed by: NURSE PRACTITIONER

## 2022-12-28 PROCEDURE — 99291 CRITICAL CARE FIRST HOUR: CPT | Performed by: INTERNAL MEDICINE

## 2022-12-28 PROCEDURE — 74011250636 HC RX REV CODE- 250/636: Performed by: STUDENT IN AN ORGANIZED HEALTH CARE EDUCATION/TRAINING PROGRAM

## 2022-12-28 PROCEDURE — 65620000000 HC RM CCU GENERAL

## 2022-12-28 PROCEDURE — 74011250637 HC RX REV CODE- 250/637: Performed by: INTERNAL MEDICINE

## 2022-12-28 PROCEDURE — 74011000258 HC RX REV CODE- 258: Performed by: STUDENT IN AN ORGANIZED HEALTH CARE EDUCATION/TRAINING PROGRAM

## 2022-12-28 PROCEDURE — 94664 DEMO&/EVAL PT USE INHALER: CPT

## 2022-12-28 RX ORDER — POTASSIUM CHLORIDE 750 MG/1
40 TABLET, FILM COATED, EXTENDED RELEASE ORAL
Status: COMPLETED | OUTPATIENT
Start: 2022-12-28 | End: 2022-12-28

## 2022-12-28 RX ORDER — DIPHENHYDRAMINE HCL 25 MG
50 CAPSULE ORAL
Status: DISCONTINUED | OUTPATIENT
Start: 2022-12-28 | End: 2023-01-01

## 2022-12-28 RX ORDER — IBUPROFEN 200 MG
4 TABLET ORAL AS NEEDED
Status: DISCONTINUED | OUTPATIENT
Start: 2022-12-28 | End: 2022-12-28 | Stop reason: SDUPTHER

## 2022-12-28 RX ORDER — POTASSIUM CHLORIDE 750 MG/1
20 TABLET, FILM COATED, EXTENDED RELEASE ORAL 2 TIMES DAILY
Status: DISCONTINUED | OUTPATIENT
Start: 2022-12-28 | End: 2022-12-29 | Stop reason: ALTCHOICE

## 2022-12-28 RX ORDER — HYDROCORTISONE SODIUM SUCCINATE 100 MG/2ML
50 INJECTION, POWDER, FOR SOLUTION INTRAMUSCULAR; INTRAVENOUS EVERY 12 HOURS
Status: DISCONTINUED | OUTPATIENT
Start: 2022-12-28 | End: 2023-01-04

## 2022-12-28 RX ORDER — INSULIN GLARGINE 100 [IU]/ML
30 INJECTION, SOLUTION SUBCUTANEOUS DAILY
Status: DISCONTINUED | OUTPATIENT
Start: 2022-12-29 | End: 2022-12-29

## 2022-12-28 RX ADMIN — HEPARIN SODIUM 5000 UNITS: 5000 INJECTION INTRAVENOUS; SUBCUTANEOUS at 08:23

## 2022-12-28 RX ADMIN — SODIUM CHLORIDE, PRESERVATIVE FREE 10 ML: 5 INJECTION INTRAVENOUS at 21:36

## 2022-12-28 RX ADMIN — Medication 3 UNITS: at 08:39

## 2022-12-28 RX ADMIN — Medication 3 UNITS: at 12:03

## 2022-12-28 RX ADMIN — ACETAZOLAMIDE SODIUM 500 MG: 500 INJECTION, POWDER, LYOPHILIZED, FOR SOLUTION INTRAVENOUS at 21:40

## 2022-12-28 RX ADMIN — INSULIN GLARGINE 20 UNITS: 100 INJECTION, SOLUTION SUBCUTANEOUS at 08:39

## 2022-12-28 RX ADMIN — Medication: at 21:39

## 2022-12-28 RX ADMIN — DIPHENHYDRAMINE HYDROCHLORIDE 50 MG: 25 CAPSULE ORAL at 01:54

## 2022-12-28 RX ADMIN — DOBUTAMINE HYDROCHLORIDE 5 MCG/KG/MIN: 200 INJECTION INTRAVENOUS at 14:27

## 2022-12-28 RX ADMIN — HEPARIN SODIUM 5000 UNITS: 5000 INJECTION INTRAVENOUS; SUBCUTANEOUS at 21:36

## 2022-12-28 RX ADMIN — IPRATROPIUM BROMIDE AND ALBUTEROL SULFATE 3 ML: .5; 3 SOLUTION RESPIRATORY (INHALATION) at 08:37

## 2022-12-28 RX ADMIN — HYDROCORTISONE SODIUM SUCCINATE 50 MG: 100 INJECTION, POWDER, FOR SOLUTION INTRAMUSCULAR; INTRAVENOUS at 14:40

## 2022-12-28 RX ADMIN — IPRATROPIUM BROMIDE AND ALBUTEROL SULFATE 3 ML: .5; 3 SOLUTION RESPIRATORY (INHALATION) at 15:08

## 2022-12-28 RX ADMIN — IRON SUCROSE 200 MG: 20 INJECTION, SOLUTION INTRAVENOUS at 08:25

## 2022-12-28 RX ADMIN — Medication 3 UNITS: at 18:15

## 2022-12-28 RX ADMIN — SODIUM CHLORIDE, PRESERVATIVE FREE 10 ML: 5 INJECTION INTRAVENOUS at 08:45

## 2022-12-28 RX ADMIN — Medication: at 08:32

## 2022-12-28 RX ADMIN — POTASSIUM CHLORIDE 40 MEQ: 750 TABLET, FILM COATED, EXTENDED RELEASE ORAL at 06:50

## 2022-12-28 RX ADMIN — IPRATROPIUM BROMIDE 2 SPRAY: 21 SPRAY, METERED NASAL at 11:58

## 2022-12-28 RX ADMIN — SODIUM CHLORIDE 6 UNITS/HR: 9 INJECTION, SOLUTION INTRAVENOUS at 23:38

## 2022-12-28 RX ADMIN — BUMETANIDE 1 MG/HR: 0.25 INJECTION, SOLUTION INTRAMUSCULAR; INTRAVENOUS at 08:24

## 2022-12-28 RX ADMIN — ACETAZOLAMIDE SODIUM 500 MG: 500 INJECTION, POWDER, LYOPHILIZED, FOR SOLUTION INTRAVENOUS at 08:23

## 2022-12-28 RX ADMIN — Medication 10 UNITS: at 21:36

## 2022-12-28 RX ADMIN — POTASSIUM CHLORIDE 20 MEQ: 750 TABLET, FILM COATED, EXTENDED RELEASE ORAL at 17:06

## 2022-12-28 RX ADMIN — SODIUM CHLORIDE, PRESERVATIVE FREE 10 ML: 5 INJECTION INTRAVENOUS at 14:41

## 2022-12-28 RX ADMIN — Medication 7 UNITS: at 17:15

## 2022-12-28 RX ADMIN — IPRATROPIUM BROMIDE 2 SPRAY: 21 SPRAY, METERED NASAL at 21:39

## 2022-12-28 RX ADMIN — TAMSULOSIN HYDROCHLORIDE 0.4 MG: 0.4 CAPSULE ORAL at 08:34

## 2022-12-28 RX ADMIN — Medication 3 UNITS: at 08:38

## 2022-12-28 RX ADMIN — BUMETANIDE 1 MG/HR: 0.25 INJECTION, SOLUTION INTRAMUSCULAR; INTRAVENOUS at 23:39

## 2022-12-28 RX ADMIN — HYDROCORTISONE SODIUM SUCCINATE 50 MG: 100 INJECTION, POWDER, FOR SOLUTION INTRAMUSCULAR; INTRAVENOUS at 21:36

## 2022-12-28 NOTE — PROGRESS NOTES
RENAL  PROGRESS NOTE        Subjective:   Patient seen from outside room;talking to cardiac team,chart reviewed  Objective:   VITALS SIGNS:    Visit Vitals  /63   Pulse (!) 106   Temp 98.6 °F (37 °C)   Resp 14   Ht 5' 9\" (1.753 m)   Wt 51.7 kg (113 lb 15.7 oz)   SpO2 98%   BMI 16.83 kg/m²       O2 Device: Nasal cannula   O2 Flow Rate (L/min): 2 l/min   Temp (24hrs), Av.5 °F (36.9 °C), Min:97.9 °F (36.6 °C), Max:99.1 °F (37.3 °C)         PHYSICAL EXAM:    deferred  DATA REVIEW:     INTAKE / OUTPUT:   Last shift:       07 - 1900  In: 125 [P.O.:100; I.V.:25]  Out: -   Last 3 shifts: 1901 -  0700  In: 2237.2 [P.O.:1340; I.V.:897.2]  Out: 6975 [Urine:6975]    Intake/Output Summary (Last 24 hours) at 2022 1126  Last data filed at 2022 0945  Gross per 24 hour   Intake 1085 ml   Output 3605 ml   Net -2520 ml         LABS:   Recent Labs     22  0354 22  0407 22  0301   WBC 9.3 6.2 6.3   HGB 9.7* 8.3* 7.2*   HCT 32.6* 27.6* 24.8*    259 239     Recent Labs     22  0354 22  1436 22  0407 22  0302 22  0302 22  1912   * 133* 136   < > 136  --    K 3.3* 4.1 3.3*   < > 3.0*  --    CL 92* 95* 97   < > 97  --    CO2 28 29 30   < > 26  --    * 322* 202*   < > 106*  --    BUN 75* 74* 71*   < > 68*  --    CREA 2.76* 2.94* 2.82*   < > 2.54*  --    CA 10.5* 10.3* 10.1   < > 9.2  --    MG 2.7*  --  2.7*  --  2.8*  --    PHOS 6.5*  --  5.5*  --  5.4*  --    ALB 4.8  --  4.9  --  4.6  --    TBILI 1.0  --  1.1*  --  1.1*  --    *  --  701*  --  634*  --    INR  --   --   --   --   --  1.4*    < > = values in this interval not displayed.            Assessment:   CKD-3b  TANNER,in the setting of 1-urinary retention 2-poor hemodynamics 3-low EF at risk for CRS            Non oliguric,creat slightly better  Urinary retention/hydro s/p donnelly  Hyponatremia  hypokalemia  Cardiomyopathy/low EF  Anemia  Mild hypercalcemia Recom to decrease Bumex gtt    Ongoing cardiac work up   No acute indication for dialysis;   critical  Discussed with him,family YESTERDAY,discussed with cardiology and ICU team today  Maxx Lloyd MD

## 2022-12-28 NOTE — ROUTINE PROCESS
07:30 SBAR from lori    09:05 Wound care RN at bedside. 10:30 HF at bedside, updated family    11:00 Dr. Eva Hidalgo at bedside, spoke and updated many family members    12:00 PT/OT at bedside, he sat on edge of bed but became hypotensive and dizzy so returned to lying position. 18:20 Continues to have poor appetite, encouraged to drink ensure. Family at bedside enticed with home cooked food. He became nauseated, denied need for Zofran. Resting quietly. 19:30 Bedside shift change report given to Soham Olivares (oncoming nurse) by MOM (offgoing nurse). Report included the following information SBAR, Kardex, Procedure Summary, Intake/Output, MAR, Accordion, Recent Results, Med Rec Status, Cardiac Rhythm ST, Alarm Parameters , Pre Procedure Checklist, Procedure Verification, and Quality Measures.

## 2022-12-28 NOTE — WOUND CARE
WOCN Note:     New consult for dark right heel. Chart shows:  Admitted on 12/22/22. Admitted for sepsis, cardiogenic shock, respiratory failure. History of MI, CABG x3, DM, CHF. Assessment:   Eyes open and smiled at his name being spoken. Assisted in repositioning by PCT onto left side. Has a Vasquez. Surface: NARENDRA mattress    left heel intact and without erythema. Heels offloaded with pillows. Buttocks and sacrum intact without erythema;sacral foam dressing applied. 1. POA right heel deep tissue injury  3 x 3 x 0 cm  100% non-blanching purple; no blistering or skin sliding or induration or fluctuance  Tx: venelex already in use and reapplied; heels offloaded with pillows    Wound Recommendations:    Continue venelex as ordered twice daily to heels    PI Prevention:  Turn/reposition approximately every 2 hours  Offload heels with heels hanging off end of pillow at all times while in bed. Sacral Foam dressing: lift to assess regularly; change as needed. Discontinue if incontinence is frequently soiling dressing. Low Air Loss mattress: Use only flat sheet and one incontinence pad. Discussed with RN. Dr. Mariella Billings made aware of heel injury via Perfect [employer].      Transition of Care: Plan to follow weekly and as needed while admitted to hospital.      GALILEO UmanaN, RN, [facility]  Certified Wound, Ostomy, Continence Nurse  office [phone]  Available via [facility]

## 2022-12-28 NOTE — PROGRESS NOTES
CRITICAL CARE NOTE      Name: Darshana Light   : 1950   MRN: [id number]   Date: 2022      Reason for ICU Admission: Cardiogenic shock     ICU PROBLEM LIST   Acute on chronic HFrEF (25-30%)  Acute hypoxic respiratory failure  TANNER on CKD3  Cardiogenic shock, resolving  Lactic acidosis  NSTEMI  CAD  DM  transaminitis    HISTORY OF PRESENT ILLNESS:   Pt is a 71 y.o M w/ PMH as noted above who presented to ED due to progressive dyspnea. History obtained from pt and spouse at bedside. Onset 3-4 days ago, DONALDSON, abdominal discomfort and distension with associated anorexia not improved by increasing home lasix dose. States difficulty voiding despite lasix doses. Of note pt w/ recent frequent admissions for HFrEF. Denies sick contacts, CP, palpitations,     Pt tachycardic, hypotensive, hypoxemic on presentation. Labs w/ troponemia, BNP above baseline, elevated lactate, and TANNER. Imaging w/ bilateral pulmonary edema, bl pulmonary effusions, bladder distension 2/2 BPH and hydronephrosis. Pt pan cultured, given dose of abx and dose of IVP lasix. Pt placed on BiPAP w/ improvement in oxygenation. Vasquez placed w/ bladder decompression. Pt initially admitted to hospitalist service, however CCM consulted due to worsening clinical status. Admitted to CCU. Respiratory status improved with diuresis, weaned vasopressors. 24 HOUR EVENTS:   Pt w/ decreased UOP not responsive to IVP diuresis, started on bumex and dobutamine gtt with improvement in UOP and BP. Formal ECHO today, and reach out to cardiology as pt with [facility] with multiple admissions this year. May require home inotrope therapy.     NEUROLOGICAL:    - Mentation appropriate  - Monitor for acute change  Seroquel 50mg qhs    PULMONOLOGY:   - O2 per NC PRN to maintain spO2 >92%  - Suspect may require home O2 as becomes hypoxemic w/ activity  - Monitor bl effusions      CARDIOVASCULAR:   - C/w dobutamine and bumex gtt  - TTE  25% with LAD, reduce TAPSE 1.1 cm, mild MR,   - Holding GDMT due to hypotension  - ASA, plavix, statin  - Cariology and HF consults - work up for HFE - ?cardiomyopathy.   Holding ASA/plavix  in anticipation of cardiac Bx    GASTROINTESTINAL:   - Cardiac, diabetic diet  - Bowel regimen for constipation  - shock liver, trend    RENAL/ELECTROLYTE/FLUIDS:   - Strict I/Os  - Suspect initially post obstructive failure, now Cr uptrending w/ likely ATN  - continue bumex as above, start diamox to support diuresis  - per Urology consult for obstruction, donnelly to remain in place for at least 7-10 days  -  w/ resolving hydronephrosis  - C/w flomax  Nephrology consulted in event of TANNER progression    ENDOCRINE:   - Hold PO anti-glycemic  - SSI, basal PRN   - Glucose goal 120-180    HEMATOLOGY/ONCOLOGY:   Change to hep ppx  AoCD     ID/MICRO:   UA negative - + blood, LE  Urine Cx negative -- stop abx, completed 5 d zosyn    PCT neg  ICU DAILY CHECKLIST     Code Status:DNR/DNI  DVT Prophylaxis: antoni  T/L/D: PIV, donnelly  SUP: N/A  Diet: cardiac, diabetic  Activity Level:ad jose f  ABCDEF Bundle/Checklist Completed:Yes  Disposition: Stay in ICU  Multidisciplinary Rounds Completed:yes  Patient/Family Updated: [address]   As noted above    SUBJECTIVE:   As noted above    Review of Systems:       OBJECTIVE:     Labs and Data: Reviewed 22  Medications: Reviewed 22  Imaging: Reviewed 22    General - in bed, chornically ill, edematous  HEENT- pupils equal, EOMI, anicteric  Neuro - follows basic commands, no focal deficit  CV - RRR  Resp - CTAB, shallow lung volumes  Abd - soft, nontender, no guarding   - + donnelly  MSK- intact, no sacral ulcer       Visit Vitals  BP (!) 104/57   Pulse (!) 101   Temp 98.1 °F (36.7 °C)   Resp 26   Ht 5' 9\" (1.753 m)   Wt 51.7 kg (113 lb 15.7 oz)   SpO2 95%   BMI 16.83 kg/m²    O2 Flow Rate (L/min): 2 l/min O2 Device: Nasal cannula Temp (24hrs), Av.4 °F (36.9 °C), Min:97.5 °F (36.4 °C), Max:99.1 °F (37.3 °C)    CVP (mmHg): 7 mmHg (12/27/22 0500)      Intake/Output:     Intake/Output Summary (Last 24 hours) at 12/28/2022 0806  Last data filed at 12/28/2022 0700  Gross per 24 hour   Intake 997.5 ml   Output 3765 ml   Net -2767.5 ml       Imaging    12/11/22    ECHO ADULT FOLLOW-UP OR LIMITED 12/14/2022 12/14/2022    Interpretation Summary    Left Ventricle: Severely reduced left ventricular systolic function with a visually estimated EF of 25 - 30%. Not well visualized. Left ventricle size is normal. Normal wall thickness. Normal wall motion. Normal diastolic function. Mitral Valve: Thickened leaflet. Moderate annular calcification of the mitral valve. Mild regurgitation. Contrast used: Definity. Note: image quality is poor, and even with Definity contrast, EF is very difficult to estimate, and the reported figure is approximate at best. Consider alternative imaging modalities such as MUGA or cardiac MRI to more accurately assess. Signed by: Emily Montoya MD on 12/14/2022  4:46 PM           CRITICAL CARE DOCUMENTATION  I had a face to face encounter with the patient, reviewed and interpreted patient data including clinical events, labs, images, vital signs, I/O's, and examined patient. I have discussed the case and the plan and management of the patient's care with the consulting services, the bedside nurses and the respiratory therapist.      NOTE OF PERSONAL INVOLVEMENT IN CARE   This patient has a high probability of imminent, clinically significant deterioration, which requires the highest level of preparedness to intervene urgently. I participated in the decision-making and personally managed or directed the management of the following life and organ supporting interventions that required my frequent assessment to treat or prevent imminent deterioration. I personally spent 30 minutes of critical care time.   This is time spent at this critically ill patient's bedside actively involved in patient care as well as the coordination of care. This does not include any procedural time which has been billed separately.     Dony Mendoza DO  Staff Intensivist  [facility]  12/28/2022

## 2022-12-28 NOTE — PROGRESS NOTES
SARA: Anticipate discharge to SNF pending medical progress. SNF choice needed. Transportation likely in car with family. RUR: 23%    Disposition: Patient re-admitted 12/22 for sepsis. This is patient's third admission at [facility] this month. Cardiology and nephrology are following. Patient with a history of cardiomyopathy, CAD s/p stenting, NSTEMI, diabetes II, HTN, CKD III. Possible cardiac MRI next week. Nephrology states patient is a poor candidate for dialysis. SNF is being recommended upon discharge. May also need home O2. CM will continue to follow.     Alejandra Montes, [address] [place] [address],[address]  [phone]

## 2022-12-28 NOTE — PROGRESS NOTES
Problem: Self Care Deficits Care Plan (Adult)  Goal: *Acute Goals and Plan of Care (Insert Text)  Description: FUNCTIONAL STATUS PRIOR TO ADMISSION: Patient was independent and active without use of DME. 2nd readmission in past month. Was completing ADLs and IADls without difficulty, went home on RA. Reports having difficulty doing stairs 2/2 increased SOB resulting in readmission. HOME SUPPORT: The patient lived with spouse but did not require assist.    Occupational Therapy Goals  Initiated 12/24/2022  1. Patient will perform upper body dressing with supervision/set-up within 7 day(s). 2.  Patient will perform lower body dressing with supervision/set-up within 7 day(s). 3.  Patient will perform bathing with supervision/set-up within 7 day(s). 4.  Patient will perform toilet transfers with supervision/set-up within 7 day(s). 5.  Patient will perform all aspects of toileting with supervision/set-up within 7 day(s). 6.  Patient will participate in upper extremity therapeutic exercise/activities with supervision/set-up for 5 minutes within 7 day(s). 7.  Patient will utilize energy conservation techniques during functional activities with verbal cues within 7 day(s). Outcome: Progressing Towards Goal   OCCUPATIONAL THERAPY TREATMENT  Patient: Eloisa August (76 y.o. male)  Date: 12/28/2022  Diagnosis: Sepsis ([facility] [A41.9] <principal problem not specified>      Precautions: Fall  Chart, occupational therapy assessment, plan of care, and goals were reviewed. ASSESSMENT  Patient continues with skilled OT services and is progressing towards goals. Session limited by significant symptomatic orthostatic hypotension on this date. Patient continues to require up to max A x2 to complete bed mobility. Patient with fair sitting balance, however upon transitioning to EOB patient with dizziness and observed clamminess. Noted drop in BP (70s/40s, see below), patient endorsing increased light headedness.  Further activity deferred, returned to supine and placed in chair position. BP remaining soft, but stable. Patient requiring assistance to manage containers on lunch tray, RN at bedside at conclusion of session. Continue to recommend SNF at [place]. Current Level of Function Impacting Discharge (ADLs): up to max A for LB ADLs, mod A UB ADLs, total A toileting, max Ax2 transfers    Other factors to consider for discharge: readmission, below baseline, [place], needs SNF         PLAN :  Patient continues to benefit from skilled intervention to address the above impairments. Continue treatment per established plan of care to address goals. Recommend with staff: chair position 3x/day for meals    Recommend next OT session: seated grooming EOB, BUE therex    Recommendation for discharge: (in order for the patient to meet his/her long term goals)  Therapy up to 5 days/week in SNF setting    This discharge recommendation:  Has been made in collaboration with the attending provider and/or case management    IF patient discharges home will need the following DME: TBD - defer to SNF        SUBJECTIVE:   Patient stated What does all this mean.     OBJECTIVE DATA SUMMARY:   Cognitive/Behavioral Status:  Neurologic State: Alert  Orientation Level: Oriented X4  Cognition: Appropriate decision making; Appropriate for age attention/concentration; Appropriate safety awareness; Follows commands     Vitals:      12/28/22 1145 12/28/22 1150 12/28/22 1152   Vital Signs   Pulse (Heart Rate) (!) 115 (!) 122 (!) 110   /66 (!) 73/45 (!) 99/59   MAP (Calculated) 80 (!) 54 72   BP 1 Location Left upper arm Left upper arm Left upper arm   BP 1 Method Automatic Automatic Automatic   BP Patient Position Supine Sitting Supine      12/28/22 1155   Vital Signs   Pulse (Heart Rate) (!) 108   /68   MAP (Calculated) 84   BP 1 Location Left upper arm   BP 1 Method Automatic   BP Patient Position   (bed in chair position)     Functional Mobility and Transfers for ADLs:  Bed Mobility:  Rolling: Minimum assistance  Supine to Sit: Minimum assistance  Sit to Supine: Maximum assistance;Assist x2  Scooting: Maximum assistance      Balance:  Sitting: Impaired; Without support  Sitting - Static: Good (unsupported)  Sitting - Dynamic: Fair (occasional)    ADL Intervention:     Patient GUILLORY with lunch tray items. Assistance provided to manage all containers  Lower Body Dressing Assistance  Socks: Maximum assistance (limited by BLE pain and stiffness)         Pain:  Pt endorsing BLE pain, did not quantify. RN notified. Activity Tolerance:   Fair and requires rest breaks    After treatment patient left in no apparent distress:   Supine in bed, Call bell within reach, Side rails x 3, and bed in modified chair position, GUILLORY with lunch tray    COMMUNICATION/COLLABORATION:   The patients plan of care was discussed with: Physical therapist, Occupational therapist, and Registered nurse.      Jayleen Escobedo OT  Time Calculation: 15 mins

## 2022-12-28 NOTE — PROGRESS NOTES
Problem: Mobility Impaired (Adult and Pediatric)  Goal: *Therapy Goal (Edit Goal, Insert Text)  Description: FUNCTIONAL STATUS PRIOR TO ADMISSION: Patient was independent and active without use of DME. Patient is currently driving. Patient is independent with ADLs and IADLs. HOME SUPPORT PRIOR TO ADMISSION: The patient lived with his wife, but did not require assist.    Physical Therapy Goals  Initiated 12/24/2022  1. Patient will move from supine to sit and sit to supine, scoot up and down, and roll side to side in bed with modified independence within 7 day(s). 2.  Patient will transfer from bed to chair and chair to bed with modified independence using the least restrictive device within 7 day(s). 3.  Patient will perform sit to stand with modified independence within 7 day(s). 4.  Patient will ambulate with modified independence for 150 feet with the least restrictive device within 7 day(s). 5.  Patient will ascend/descend 13 stairs with single handrail(s) with modified independence within 7 day(s). Outcome: Progressing Towards Goal   PHYSICAL THERAPY TREATMENT  Patient: Raghu Shah (76 y.o. male)  Date: 12/28/2022  Diagnosis: [facility]is ([facility] [A41.9] <principal problem not specified>      Precautions: Fall  Chart, physical therapy assessment, plan of care and goals were reviewed. ASSESSMENT  Patient continues with skilled PT services and is very slowly progressing towards goals. Received in right sidelying with family present. Patient agreeable to therapy and c/o pain in LEs and feet. Overall min A to achieve sitting EOB (+ max A for scooting to EOB) but was orthostatic. He did not recover with ankle pumps and became less responsive therefore was returned to supine. Recovered and placed bed in chair position. Recommend rehab stay at d/c. If home, would need w/c and physical assist for all transfers.       Starting pressure: 107/66- supine  Vitals:    12/28/22 1150 12/28/22 1152 12/28/22 1155 12/28/22 1200   BP: (!) 73/45 (!) 99/59 115/68 109/64   BP 1 Location: Left upper arm Left upper arm Left upper arm    BP Patient Position: Sitting Supine Comment: bed in chair position    Pulse: (!) 122 (!) 110 (!) 108 (!) 108   Temp:    99.5 °F (37.5 °C)   Resp:    18   Height:       Weight:       SpO2:    98%         Current Level of Function Impacting Discharge (mobility/balance): min-max Ax2    Other factors to consider for discharge: orthostatic         PLAN :  Patient continues to benefit from skilled intervention to address the above impairments. Continue treatment per established plan of care. to address goals. Recommendation for discharge: (in order for the patient to meet his/her long term goals)  Therapy up to 5 days/week in SNF setting  If home, HHPT and family assist for all transfers. Would need first floor living. This discharge recommendation:  Has been made in collaboration with the attending provider and/or case management    IF patient discharges home will need the following DME: w/c and family assist for all transfers        SUBJECTIVE:   Patient stated I'm light headed.     OBJECTIVE DATA SUMMARY:   Critical Behavior:  Neurologic State: Alert  Orientation Level: Oriented X4  Cognition: Appropriate decision making, Appropriate for age attention/concentration, Appropriate safety awareness, Follows commands  Safety/Judgement: Awareness of environment  Functional Mobility Training:  Bed Mobility:  Rolling: Minimum assistance  Supine to Sit: Minimum assistance  Sit to Supine: Maximum assistance;Assist x2  Scooting: Maximum assistance  Balance:  Sitting: Impaired; Without support  Sitting - Static: Good (unsupported)  Sitting - Dynamic: Fair (occasional)      Therapeutic Exercises:    Ankle pumps sitting EOB  Pain Rating:  LEs and feet, did not rate    Activity Tolerance:   signs and symptoms of orthostatic hypotension    After treatment patient left in no apparent distress:   Call bell within reach and bed in chair position    COMMUNICATION/COLLABORATION:   The patients plan of care was discussed with: Occupational therapist and Registered nurse.      Nury Corbett PT, DPT   Time Calculation: 15 mins

## 2022-12-28 NOTE — PROGRESS NOTES
1930: Bedside and Verbal shift change report given to Tea Irwin RN (oncoming nurse) by Lindy Lopez RN (offgoing nurse). Report included the following information SBAR, Kardex, ED Summary, OR Summary, Procedure Summary, Intake/Output, MAR, Recent Results, Cardiac Rhythm ST, and Alarm Parameters . 1945: Gtts verified. 0730: Bedside and Verbal shift change report given to SEA RN (oncoming nurse) by Tea Irwin RN (offgoing nurse). Report included the following information SBAR, Kardex, ED Summary, OR Summary, Procedure Summary, Intake/Output, MAR, Recent Results, Cardiac Rhythm ST, and Alarm Parameters .

## 2022-12-28 NOTE — PROGRESS NOTES
[facility] in [facility]  Inpatient Progress Note      Patient name: Jem Brain  Patient : 1950  Patient MRN: [id number]  Consulting MD: Eugenia Rush DO  Date of service: 22    REASON FOR REFERRAL:  Management of acute on chronic systolic heart failure    Plan of Care:  71 y.o. male with hx ICM, CAD s/p CABG, admitted for Acute on Chronic HFrEF. Likely causes of worsening HF are untreated TRISTAN, amyloidosis, progression of ICM or myocarditis. initial HF evaluation including PYP when patient is able to lay flat. Will continue inotropic support for cardiogenic shock  Feel risk outweighs benefit for biopsy at this time. Trial steroids. Consider cardiac MRI next week. INTERVAL HISTORY:  -tachy, but no significant ectopy   -Hg up to 9.7; creat slight decrease at 2.76; phos up to 6.7; CRP>9.5; K 3.3; LFTs improving; t bili down to 1.0  -weight down 5#; I/O inc, but negative 2.7L with what's documented  -Mr. Kendall is very tired. He also c/o a headache. Hearing aids need a new battery. ROS limited by patient falling asleep between questions. Multiple family members at bedside involved with discussion and updated on plan of care  -discussed care with Dr. Lidia Baldwin and Dr. Claudia Iqbal.        RECOMMENDATIONS:  Continue current medical therapy for heart failure  Hold GDMT due to cardiogenic shock  Continue Dobutamine gtt at 5mcg/kg/min- watch for increased ectopy  Continue Bumex gtt, dosing per nephrology, plan for neg 1-2L  uric acid level 6.8  No indication for systemic anticoagulation  ASA and plavix on hold in case biopsy performed   No statin due to transaminitis- improving   Keep K > 4 and Mag > 2  Transfuse to keep Hgb > 7  Pulmonary hygiene, wean O2 as able   Labs: CBC, BMP, LFT, pro-NT-BNP  Venofer x 2  Will need OP Sleep Study  Send genetic testing tomorrow   Appreciate nephrology and cardiology recommendations   Consider cMRI when able   Order PYP  Would hold off on biopsy at this time, as risk likely outweighs beneift  Start trial of steroids to see if it improves clinical picture; lower infection risk discussed with intensivisit. Solucortef 1mg/kg every 12 hours   Nutritionist consult ordered   PT/OT  Heart failure education  Patient is currently a DNR, if changes to Full Code will need to consider lifevest at discharge   Plan at discharge: recommend flu and pneumonia vaccinations  All other care per primary team    IMPRESSION:  Critically ill  Cardiogenic shock- improving slightly on inotropic support  Acute on chronic combined systolic/diastolic  heart failure  Stage D, NYHA class IV symptoms  Likely combination of ICM/NICM cardiomyopathy, LVEF 20%  Transaminitis improving   Acute on CKD, baseline creatinine 1.5-1.7.  levels steady high   Volume overload improving   CAD s/p CABG x 2: further disease best managed medically due to small vessel size   Mild AS  PAD  Bilateral hydronephrosis s/p donnelly  DM2  Fatigue, severe. LIFE GOALS:  Lifestyle goals reviewed with the patient. Patient's personal goals include: TBD  Important upcoming milestones or family events: TBD  The patient identifies the following as important for living well: TBD      HPI:  71 y.o. male who was admitted via ED for worsening SOB, poor appetite, orthopnea and fatigue. Pmhx of CAD s/p CABG, PAD, DM 2, recent admission for HFmrEF earlier this month. Serial TTEs show progressive decline in EF since 3/2021 with the most recent EF at 25-30%. Recent LHC shows diffuse CAD and no interventions indicated. Patient had Orelia Decent recently and there is some thought to myocarditis or other etiology causing worsening HF. [facility] was consulted for further evaluation and management of HFrEF. CARDIAC IMAGING:  Echo 12/26/22    Left Ventricle: Severely reduced left ventricular systolic function with a visually estimated EF of 25 - 30%.  Left ventricle size is normal. Normal wall thickness. There are regional wall motion abnormalities. Grade II diastolic dysfunction with increased LAP. Right Ventricle: Moderately reduced systolic function. TAPSE is abnormal. TAPSE is 1.1 cm. Aortic Valve: Mild stenosis of the aortic valve. AV peak gradient is 13 mmHg. AV peak velocity is 1.8 m/s. Mitral Valve: Not well visualized. Moderate annular calcification at the posterior leaflet of the mitral valve. Mild to moderate regurgitation. Tricuspid Valve: Mildly elevated RVSP. Left Atrium: Left atrium is moderately dilated. 12/8/22    Left Ventricle: Moderately reduced left ventricular systolic function with a visually estimated EF of 35 - 40%. Severe hypokinesis of the following segments: mid anteroseptal, apical anterior, apical septal, apical inferior and apical lateral. Severe hypokinesis of the apex. Mitral Valve: Severely thickened leaflet, at the anterior and posterior leaflets. Severely calcified leaflet, at the anterior and posterior leaflets. Mild annular calcification of the mitral valve. Moderate regurgitation. Left Atrium: Left atrium is mildly dilated. Contrast used: Definity. limited study    EKG 12/22/22 ST, Biatria enlargement, marked ST abnormality    [facility] 12/6/22  1. Normal LVEDP  2. Severe native multivessel coronary artery disease  3. Patent LIMA to LAD and vein graft to distal RCA  4. Recurrent ISR in OM1 stent with now 60 to 70% restenosis  5. Recoil of left main and circumflex stent with now recurrent 40 to 50% stenosis. 6.  Progression of ostial left main disease now to about 60% stenosis  7. Progression of disease in jailed first marginal branch now with diffuse 90% stenosis  8.   High-grade stenosis in the mid to distal right potential femoral artery treated with 6 x 40 mm impact drug-coated balloon angioplasty to reduce the stenosis to less than 40%    NST      HEMODYNAMICS:  RHC not done  CPEST too ill   6MW too ill    OTHER IMAGING:  CXR   XR Results (most recent):  Results from Hospital Encounter encounter on 12/22/22    XR CHEST PORT    Narrative  EXAM: XR CHEST PORT    DATE: 12/24/2022 6:04 PM    INDICATION: CVC placement    COMPARISON: Chest radiograph December 24, 2022 at 1412 hours    FINDINGS: AP portable chest radiograph. There is a new RIGHT IJ central venous  catheter with the tip near the cavoatrial junction. Patient is status post  sternotomy and CABG. The heart is mildly enlarged but stable. The lungs are  hyperinflated. There are persistent, small bilateral pleural effusions. There is  no pneumothorax. A skin fold projects over the RIGHT upper lung. The vascular  clarity is again mildly diminished. Impression  1. RIGHT IJ catheter is in satisfactory position with the tip at the cavoatrial  junction. No pneumothorax. 2. No change in small bilateral effusions. CT Results (most recent):  Results from Hospital Encounter encounter on 12/22/22    CT ABD PELV WO CONT    Narrative  EXAM: CT ABD PELV WO CONT    INDICATION: rlq abdominal pain    COMPARISON: 5/3/2014    IV CONTRAST: None. ORAL CONTRAST: None    TECHNIQUE:  Thin axial images were obtained through the abdomen and pelvis. Coronal and  sagittal reformats were generated. CT dose reduction was achieved through use of  a standardized protocol tailored for this examination and automatic exposure  control for dose modulation. The absence of intravenous contrast material reduces the sensitivity for  evaluation of the vasculature and solid organs. FINDINGS:  LOWER THORAX: Small bilateral pleural effusions. Partial collapse bases  LIVER: No mass. BILIARY TREE: Gallstones. No evidence of acute cholecystitis CBD is not dilated. SPLEEN: within normal limits. PANCREAS: No focal abnormality. ADRENALS: 19 mm right adrenal nodule unchanged,  KIDNEYS/URETERS: Mild bilateral hydronephrosis. No stone  STOMACH: Unremarkable.   SMALL BOWEL: No dilatation or wall thickening. COLON: No dilatation or wall thickening. APPENDIX: Surgically absent  PERITONEUM: No ascites or pneumoperitoneum. RETROPERITONEUM: No lymphadenopathy or aortic aneurysm. Extensive vascular  calcifications  REPRODUCTIVE ORGANS: Mildly enlarged  URINARY BLADDER: Massive distention  BONES: No destructive bone lesion. ABDOMINAL WALL: Small umbilical hernia containing fat. ADDITIONAL COMMENTS: N/A    Impression  1. Bladder is massively distended with mild bilateral hydronephrosis. May  indicate bladder outlet obstruction. Prostate is mildly enlarged  2. Gallstones. No acute cholecystitis  3.  Small bilateral pleural effusions      PHYSICAL EXAM:  Visit Vitals  /63   Pulse (!) 101   Temp 99.3 °F (37.4 °C)   Resp 15   Ht 5' 9\" (1.753 m)   Wt 113 lb 15.7 oz (51.7 kg)   SpO2 97%   BMI 16.83 kg/m²     Physical Assessment:   General Appearance: sleeping, ill-appearing elderly male awakens to moderate touch; appears older than documented age; [place]  Eyes: sclera anicteric  Mouth/Throat: moist mucous membranes; oral pharynx clear  Neck: supple;  Pulmonary:  dim to auscultation bilaterally; poor effort;   Cardiovascular: tachy rate and rhythm; no murmur, click, rub, or gallop  Abdomen: soft, non-tender, non-distended; bowel sounds normal  Musculoskeletal: no swelling or deformity; moves all extremities  Extremities: no edema; palpable distal pulses   Skin: warm and dry  Neuro: drowsy, but appropriate when awakened   Psych: difficult to assess with patient fatigue        REVIEW OF SYSTEMS:  Review of Symptoms:  Constitutional: fatigue, weak  Eyes: negative  Ears, nose, mouth, throat, and face: [place]  Respiratory: negative  Cardiovascular: negative  Gastrointestinal: negative  Genitourinary:negative  Musculoskeletal:negative  Neurological: HA  Behvioral/Psych: negative  Endocrine: negative        PAST MEDICAL HISTORY:  Past Medical History:   Diagnosis Date    CAD (coronary artery disease) 11/10/2016    NSTEMI & 2 stents    Deafness 10/28/2012    DM (diabetes mellitus) ([facility] [address]     Elevated cholesterol     Hypertension     NSTEMI (non-ST elevated myocardial infarction) ([facility] [address] 11/10/2016       PAST SURGICAL HISTORY:  Past Surgical History:   Procedure Laterality Date    COLONOSCOPY N/A 6/28/2018    COLONOSCOPY performed by Merrill Kilgore MD at [facility] ENDOSCOPY    [address]  11/11/2016    2 stents       FAMILY HISTORY:  Family History   Problem Relation Age of Onset    Heart Disease Father     Heart Attack Father     Hypertension Mother     Elevated Lipids Brother     Elevated Lipids Brother     No Known Problems Sister     Elevated Lipids Brother     No Known Problems Son     No Known Problems Daughter     Anesth Problems Neg Hx        SOCIAL HISTORY:  Social History     Socioeconomic History    Marital status:    Tobacco Use    Smoking status: Never     Passive exposure: Never    Smokeless tobacco: Never   Vaping Use    Vaping Use: Never used   Substance and Sexual Activity    Alcohol use: Yes     Alcohol/week: 2.0 standard drinks     Types: 1 Cans of beer, 1 Shots of liquor per week     Comment: rarely    Drug use: No    Sexual activity: Yes     Social Determinants of Health     Financial Resource Strain: Medium Risk    Difficulty of Paying Living Expenses: Somewhat hard   Food Insecurity: Food Insecurity Present    Worried About Running Out of Food in the Last Year: Never true    Ran Out of Food in the Last Year: Often true       LABORATORY RESULTS:     Labs Latest Ref Rng & Units 12/28/2022 12/27/2022 12/27/2022 12/26/2022 12/25/2022 12/25/2022 12/24/2022   WBC 4.1 - 11.1 K/uL 9.3 - 6.2 6.3 - 5.9 15. 3(H)   RBC 4.10 - 5.70 M/uL 4.16 - 3.68(L) 3.14(L) - 3.07(L) 3.63(L)   Hemoglobin 12.1 - 17.0 g/dL 9.7(L) - 8. 3(L) 7. 2(L) 7. 1(L) 7. 2(L) 8. 3(L)   Hematocrit 36.6 - 50.3 % 32. 6(L) - 27. 6(L) 24. 8(L) - 24. 3(L) 27. 2(L)   MCV 80.0 - 99.0 FL 78. 4(L) - 75. 0(L) 79. 0(L) - 79. 2(L) 74. 9(L) Platelets 465 - 035 K/uL 309 - 259 239 - 214 343   Lymphocytes 12 - 49 % 11(L) - 12 10(L) - 17 6(L)   Monocytes 5 - 13 % 10 - 8 7 - 9 6   Eosinophils 0 - 7 % 4 - 4 2 - 4 1   Basophils 0 - 1 % 1 - 1 1 - 1 1   Albumin 3.5 - 5.0 g/dL 4.8 - 4.9 4.6 - 4.2 3. 3(L)   Calcium 8.5 - 10.1 MG/DL 10. 5(H) 10. 3(H) 10.1 9.2 - 8.9 9.9   Glucose 65 - 100 mg/dL 196(H) 322(H) 202(H) 106(H) - 88 157(H)   BUN 6 - 20 MG/DL 75(H) 74(H) 71(H) 68(H) - 60(H) 60(H)   Creatinine 0.70 - 1.30 MG/DL 2.76(H) 2.94(H) 2.82(H) 2.54(H) - 2.10(H) 2.00(H)   Sodium 136 - 145 mmol/L 133(L) 133(L) 136 136 - 135(L) 135(L)   Potassium 3.5 - 5.1 mmol/L 3. 3(L) 4.1 3.3(L) 3.0(L) - 3.6 3.3(L)   TSH 0.36 - 3.74 uIU/mL - 2.12 - - - - -   PSA 0.01 - 4.0 ng/mL - 3.9 - - - - -   Some recent data might be hidden     Lab Results   Component Value Date/Time    TSH 2.12 12/27/2022 02:36 PM    TSH 4.80 (H) 12/06/2022 03:53 AM    TSH 5.39 (H) 10/12/2022 09:10 AM    TSH 3.53 02/03/2022 11:47 AM    TSH 5.790 (H) 11/21/2019 04:45 PM    TSH 3.08 06/22/2018 01:53 PM    TSH 4.250 05/26/2015 09:43 AM       ALLERGY:  No Known Allergies     CURRENT MEDICATIONS:    Current Facility-Administered Medications:     diphenhydrAMINE (BENADRYL) capsule 50 mg, 50 mg, Oral, QHS, Jessica MD Eda, 50 mg at 12/28/22 0154    potassium chloride SR (KLOR-CON 10) tablet 20 mEq, 20 mEq, Oral, BID, Colleen Astorga NP    hydrocortisone Sod Succ (PF) (SOLU-CORTEF) injection 50 mg, 50 mg, IntraVENous, Q12H, Colleen Astorga NP, 50 mg at 12/28/22 1440    acetaZOLAMIDE (DIAMOX) 500 mg in sterile water (preservative free) 5 mL injection, 500 mg, IntraVENous, Q12H, Marveclarka Iba, DO, 500 mg at 12/28/22 0823    heparin (porcine) injection 5,000 Units, 5,000 Units, SubCUTAneous, Q12H, Marvetta Iba, DO, 5,000 Units at 12/28/22 0823    insulin glargine (LANTUS) injection 20 Units, 20 Units, SubCUTAneous, DAILY, Seamus Iba, DO, 20 Units at 12/28/22 0839    QUEtiapine (SEROquel) tablet 50 mg, 50 mg, Oral, QHS, Marianne Kaur DO, 50 mg at 12/27/22 2137    lidocaine 4 % patch 1 Patch, 1 Patch, TransDERmal, Q24H, Beto MOLINA MD, 1 Patch at 12/27/22 1645    bumetanide (BUMEX) 0.25 mg/mL infusion, 0-2 mg/hr, IntraVENous, TITRATE, Beto MOLINA MD, Last Rate: 4 mL/hr at 12/28/22 0824, 1 mg/hr at 12/28/22 0824    albuterol-ipratropium (DUO-NEB) 2.5 MG-0.5 MG/3 ML, 3 mL, Nebulization, Q6H RT, Rajw[facility], Walker MOLINA MD, 3 mL at 12/28/22 1508    DOBUTamine (DOBUTREX) 500 mg/250 mL (2,000 mcg/mL) infusion, 5 mcg/kg/min, IntraVENous, CONTINUOUS, Beto MOLINA MD, Last Rate: 8.5 mL/hr at 12/28/22 1427, 5 mcg/kg/min at 12/28/22 1427    balsam peru-castor oiL (VENELEX) ointment, , Topical, BID, Sp Johnson MD, Given at 12/28/22 2002    NOREPINephrine (LEVOPHED) 8 mg in 5% dextrose 250mL (32 mcg/mL) infusion, 0.5-30 mcg/min, IntraVENous, TITRATE, Beto MOLINA MD, Stopped at 12/24/22 2342    alteplase (CATHFLO) 1 mg in sterile water (preservative free) 1 mL injection, 1 mg, InterCATHeter, PRN, Beto MOLINA MD    bacitracin 500 unit/gram packet 1 Packet, 1 Packet, Topical, PRN, Beto MOLINA MD    glucose chewable tablet 16 g, 4 Tablet, Oral, PRN, Beto MOLINA MD    glucagon (GLUCAGEN) injection 1 mg, 1 mg, IntraMUSCular, PRN, Nga Mensah MD    dextrose 10 % infusion 0-250 mL, 0-250 mL, IntraVENous, PRN, Nga Mensah MD    insulin lispro (HUMALOG) injection, , SubCUTAneous, AC&HS, Nga Mnesah MD, 3 Units at 12/28/22 1203    dextrose 10 % infusion 0-250 mL, 0-250 mL, IntraVENous, PRN, Walker Morgan MD    insulin lispro (HUMALOG) injection 3 Units, 0.05 Units/kg, SubCUTAneous, TID WITH MEALS, Beto MOLINA MD, 3 Units at 12/28/22 1203    senna-docusate (PERICOLACE) 8.6-50 mg per tablet 1 Tablet, 1 Tablet, Oral, BID PRN, Nga Mensah MD, 1 Tablet at 12/26/22 5586    [Held by provider] rosuvastatin (CRESTOR) tablet 10 mg, 10 mg, Oral, QHS, Walker Aponte MD, 10 mg at 12/26/22 2209    bisacodyL (DULCOLAX) suppository 10 mg, 10 mg, Rectal, QHS PRN, Kobe MOLINA MD    sodium chloride (NS) flush 5-40 mL, 5-40 mL, IntraVENous, Q8H, Walker Aponte MD, 10 mL at 12/28/22 1441    sodium chloride (NS) flush 5-40 mL, 5-40 mL, IntraVENous, PRN, Sylvie Rushing MD, 10 mL at 12/28/22 0845    acetaminophen (TYLENOL) tablet 650 mg, 650 mg, Oral, Q6H PRN, 650 mg at 12/26/22 1714 **OR** acetaminophen (TYLENOL) suppository 650 mg, 650 mg, Rectal, Q6H PRN, Walker Weeks MD    polyethylene glycol (MIRALAX) packet 17 g, 17 g, Oral, DAILY PRN, Sylvie Rushing MD, 17 g at 12/26/22 0649    ondansetron (ZOFRAN ODT) tablet 4 mg, 4 mg, Oral, Q8H PRN **OR** ondansetron (ZOFRAN) injection 4 mg, 4 mg, IntraVENous, Q6H PRN, Kobe MOLINA MD, 4 mg at 12/24/22 0849    [Held by provider] aspirin delayed-release tablet 81 mg, 81 mg, Oral, DAILY, Raiza Youngblood MD, 81 mg at 12/27/22 0825    [Held by provider] clopidogreL (PLAVIX) tablet 75 mg, 75 mg, Oral, DAILY, Raiza Youngblood MD, 75 mg at 12/27/22 0826    [Held by provider] empagliflozin (JARDIANCE) tablet 10 mg, 10 mg, Oral, DAILY, Raiza Youngblood MD    ergocalciferol capsule 50,000 Units, 50,000 Units, Oral, Q7D, Raiza Youngblood MD, 50,000 Units at 12/23/22 0923    ipratropium (ATROVENT) 21 mcg (0.03 %) nasal spray 2 Spray, 2 Spray, Both Nostrils, Q12H, Raiza Youngblood MD, 2 [place] at 12/28/22 1158    [Held by provider] ivabradine (CORLANOR) tablet 2.5 mg, 2.5 mg, Oral, BID WITH MEALS, Raiza Youngblood MD, 2.5 mg at 12/23/22 0850    tamsulosin (FLOMAX) capsule 0.4 mg, 0.4 mg, Oral, DAILY, Michel, Angeles Hernandez MD, 0.4 mg at 12/28/22 0834    sodium chloride (NS) flush 5-40 mL, 5-40 mL, IntraVENous, PRN, Sylvie Rushing MD    PATIENT CARE TEAM:  Patient Care Team:  Saman Salinas MD as PCP - General (Family Medicine)  Saman Salinas MD as PCP - REHABILITATION [facility] Empaneled Provider  Dawne Baumgarten, MD (Cardiovascular Disease Physician)  Severo Foot, MD (Gastroenterology)  Iva Saavedra MD (Cardiothoracic Surgery)  Edwin Olivares MD (Cardiovascular Disease Physician)  Silvia Mas MD (Nephrology)  Annalisa Banda RN as Care Transitions Nurse  Marija Raza MD (Pulmonary Disease)     Thank you for allowing me to participate in this patient's care. Randy Alvarenga NP   [address] [place] [address], [address]  Phone: [phone]    On this date 12/28/2022, I have spent greater than 50% of a 35 minute visit personally reviewing new vitals, test results, notes, telemetry/EKG, face to face encounter/physical exam of patient, writing orders, performing medical decision making, and documenting.

## 2022-12-28 NOTE — DIABETES MGMT
[facility]  DIABETES MANAGEMENT CONSULT    Consulted by  Nikunj Guo MD   for advanced nursing evaluation and care for inpatient blood glucose management. Evaluation and Action Plan   Bayron Carvajal is a 71year old gentleman, with Type 2 Diabetes with a recent A1C of 6.5%, who was admitted with acute hypoxic respiratory s/t acute on chronic HFrEF and TANNER. He was admitted 2 weeks ago for similar complaint and antihyperglycemic agents were adjusted on discharge. Metformin was stopped due to decline in GFR and Jardiance switched to [place] (unclear why). He was compliant with his [place], [place] and Glipizide up until day prior to this hospitalization. His glucose was significantly elevated at 458 on admission, s/t insulin resistance from acute HFrEF, elevated lactate and impaired perfusion. GFR is 27 and would avoid therefore avoid GUILLORY. Please start basal/bolus insulin therapy at this time and target an inpatient BG goal of 140-180mg/dl. BG trends since admission have fluctuated and required reducing basal insulin due to lower normal BG in 80s. Noted prandial hyperglycemia in past 24h-Fasting ; Would adjust basal back to 12 units daily-PO intake poor today per notes-Basal insulin adjusted to 20 units daily on 12/28/22. Management Rationale Action Plan   Medication   Basal needs Using low dose 0.2 units/kg/D based on low BMI Resume 12 units Lantus daily (as used with good glucose impact last admission)    If Fasting BG sustained over 180mgl/dl, increase to 0.3 units/kg/day.       Reduce dose by 20% if NPO   Nutritional needs Using low sensitivity based on low BMI CONTINUE  low dose humalog 0.05 units/kg/meal  3 units Humalog/meal    Hold if patient is NPO or consumes less than 50% of carbohydrates on meal tray    Advance by 2 units daily for persistent pre-prandial hyperglycemia     Corrective insulin Using normal sensitivity based on  Normal Sensitivity ACHS   Additional Recommendations. POC glucose ACHS    Consistent carbohydrate diet (60 grams CHO/meal). Patient interested in Glucerna    3. Please hold oral antihyperglycemic agents in the inpatient setting,              Initial Presentation   Emiliano Valadez is a 71 y.o. male who presented to the ED 12/22/22 with a 3-4 day c/o gradually worsening dyspnea, fatigue, chills, constipation and RLQ pain. He endorses difficulty voiding despite compliance with lasix. In the ED, he was hypoxic and started on supplemental O2 and diuresis. LAB: WBC 25.1, , GFR 25, Lac 5.2, Trop 1096, BNP 6905  CXR: Widespread interstitial opacities bilateral mid to lower lung opacities  concerning for edema and/or atypical infection. Small bilateral pleural  effusions. CT: CT revealed distended bladder  EKG: Sinus tachycardia   Biatrial enlargement   Rightward axis   Marked ST abnormality, possible inferior subendocardial injury   Marked ST abnormality, possible lateral subendocardial injury   Abnormal ECG   Did have a hospital admission for HFrEF 12/11/22-12/16/22    HX:   Past Medical History:   Diagnosis Date    CAD (coronary artery disease) 11/10/2016    NSTEMI & 2 stents    Deafness 10/28/2012    DM (diabetes mellitus) ([facility] [address]     Elevated cholesterol     Hypertension     NSTEMI (non-ST elevated myocardial infarction) ([facility] [address] 11/10/2016    CKD  CAD with CABG 2018    INITIAL DX:   Sepsis ([facility] [address] [A41.9]     Current Treatment     TX: IVF, Diuresis, Supplemental O2    Hospital Course   Clinical progress has been complicated by:   76/55: Initial admission with hospitalist service but with progressive hypoxia early requiring CCU transfer. Elevated troponin/BNP. BPH and hydronephrosis. Pt pan cultured, given dose of abx and dose of IVP lasix. Pt placed on BiPAP w/ improvement in oxygenation.     12/27: cardiology suggesting myocarditis as reason for cardiomyopathy systolic HF>   Diabetes History   Type 2 Diabetes  Ambulatory BG management provided by: PCP Erin Broderick MD    Diabetes-related Medical History  Acute complications  Acute hyperglycemia  Neurological complications  Peripheral neuropathy  Microvascular disease  Nephropathy  Macrovascular disease  CAD  Other associated conditions     CHF    Diabetes Medication History  Key Antihyperglycemic Medications               dapagliflozin (FARXIGA) 10 mg tab tablet (Taking) Take 1 Tablet by mouth daily. glipiZIDE (GLUCOTROL) 5 mg tablet (Taking) Take 1 tablet by mouth twice daily    Januvia 50 mg tablet (Taking) Take 1 tablet by mouth once daily             Diabetes self-management practices:   Eating pattern   Eats 3 small meals daily  [x] Breakfast  2 Eggs, Coffee  [x] Lunch   [place]  [x] Dinner   \" Food\" Bread, rice, lentils   [x] Bedtime  [place]  [x] Snacks   Afternoon snack  [x] Beverages  Water, Coffee  Physical activity pattern   Sedentary   Monitoring pattern  Does not check BG  Taking medications pattern  [x] Consistent administration  [x] Affordable  Social determinants of health impacting diabetes self-management practices   Concerned that you need to know more about how to stay healthy with diabetes  Overall evaluation:    [x] Achieving A1c target with drug therapy & self-care practices    Subjective   I took my medicine yesterday.      Objective   Physical exam  General Underweight male in mild distress/ill-appearing. Conversant and cooperative  Neuro  Alert, oriented   Vital Signs Visit Vitals  /62   Pulse (!) 104   Temp 99.3 °F (37.4 °C)   Resp 20   Ht 5' 9\" (1.753 m)   Wt 51.7 kg (113 lb 15.7 oz)   SpO2 97%   BMI 16.83 kg/m²     Skin  Warm and dry. Acanthosis noted along neckline. No lipohypertrophy or lipoatrophy noted at injection sites   Heart   Regular rate and rhythm.  No murmurs, rubs or gallops  Lungs  Clear to auscultation without rales or rhonchi  Extremities No foot wounds        Laboratory  Recent Labs     12/28/22  0354 12/27/22  1436 22  0407 22  0302 22  0302 22  0301   * 322* 202*   < > 106*  --    AGAP 13 9 9   < > 13  --    WBC 9.3  --  6.2  --   --  6.3   CREA 2.76* 2.94* 2.82*   < > 2.54*  --    *  --  701*  --  1,342*  --    *  --  701*  --  634*  --     < > = values in this interval not displayed. Factors impacting BG management  Factor Dose Comments   Nutrition:  Standard meals     60 grams/meal      Drugs:  Vasopressor load   Levophed  Affects insulin delivery     Heart Failure EF 20-30%         Other:   Kidney function GFR 27      Blood glucose pattern    Significant diabetes-related events over the past 24-72 hours  A1C  6.5% 22 (down from 7.0 10/12/22)  Fasting B  Pre-prandial: >200  Basal: Lantus 8units--> adjusted to 20 units on 22  Bolus: 3 units TID  Correction: normal sensitivity    Last admission was on Jardiance 10, alogliptin 12.5, 12 units Lantus, correctional humalog- experienced pre-prandial hyperglycemia.       Assessment and Nursing Intervention   Nursing Diagnosis Risk for unstable blood glucose pattern   Nursing Intervention Domain 5250 Decision-making Support   Nursing Interventions Examined current inpatient diabetes/blood glucose control   Explored factors facilitating and impeding inpatient management  Explored corrective strategies with patient and responsible inpatient provider   Informed patient of rational for insulin strategy while hospitalized     Nursing Diagnosis 47509 Ineffective Health Management   Nursing Intervention Domain 5250 Decision-making Support   Nursing Interventions Identified diabetes self-management practices impeding diabetes control  Discussed diabetes survival skills related to  Healthy Plate eating plan; given handouts  Role of physical activity in improving insulin sensitivity and action  Procedure for blood glucose monitoring & options for low-cost products  Medications plan at discharge     Billing Code(s) [x] 40515    Before making these care recommendations, I personally reviewed the hospitalization record, including notes, laboratory & diagnostic data and current medications, and examined the patient at the bedside (circumstances permitting) before making care recommendations. More than fifty (50) percent of the time was spent in patient counseling and/or care coordination.   Total minutes: [address] [facility] SLICK Capps  Diabetes Clinical Nurse Specialist  [facility]  Access via [address] [place] [address]

## 2022-12-29 ENCOUNTER — APPOINTMENT (OUTPATIENT)
Dept: NUCLEAR MEDICINE | Age: 72
DRG: 871 | End: 2022-12-29
Attending: NURSE PRACTITIONER
Payer: MEDICARE

## 2022-12-29 PROBLEM — Z79.899 RECEIVING INOTROPIC MEDICATION: Status: ACTIVE | Noted: 2022-01-01

## 2022-12-29 PROBLEM — I50.23 SYSTOLIC CHF, ACUTE ON CHRONIC (HCC): Status: ACTIVE | Noted: 2022-12-29

## 2022-12-29 PROBLEM — I50.23 SYSTOLIC CHF, ACUTE ON CHRONIC (HCC): Status: ACTIVE | Noted: 2022-01-01

## 2022-12-29 LAB
ALBUMIN SERPL-MCNC: 4.4 G/DL (ref 3.5–5)
ALBUMIN SERPL-MCNC: 4.6 G/DL (ref 3.5–5)
ALBUMIN/GLOB SERPL: 1.3 (ref 1.1–2.2)
ALBUMIN/GLOB SERPL: 1.4 (ref 1.1–2.2)
ALP SERPL-CCNC: 162 U/L (ref 45–117)
ALP SERPL-CCNC: 177 U/L (ref 45–117)
ALT SERPL-CCNC: 300 U/L (ref 12–78)
ALT SERPL-CCNC: 377 U/L (ref 12–78)
ANION GAP SERPL CALC-SCNC: 12 MMOL/L (ref 5–15)
ANION GAP SERPL CALC-SCNC: 14 MMOL/L (ref 5–15)
AST SERPL-CCNC: 55 U/L (ref 15–37)
AST SERPL-CCNC: 81 U/L (ref 15–37)
BACTERIA SPEC CULT: NORMAL
BASOPHILS # BLD: 0.1 K/UL (ref 0–0.1)
BASOPHILS NFR BLD: 1 % (ref 0–1)
BILIRUB SERPL-MCNC: 0.6 MG/DL (ref 0.2–1)
BILIRUB SERPL-MCNC: 0.7 MG/DL (ref 0.2–1)
BUN SERPL-MCNC: 87 MG/DL (ref 6–20)
BUN SERPL-MCNC: 96 MG/DL (ref 6–20)
BUN/CREAT SERPL: 33 (ref 12–20)
BUN/CREAT SERPL: 36 (ref 12–20)
CALCIUM SERPL-MCNC: 10.7 MG/DL (ref 8.5–10.1)
CALCIUM SERPL-MCNC: 10.9 MG/DL (ref 8.5–10.1)
CHLORIDE SERPL-SCNC: 92 MMOL/L (ref 97–108)
CHLORIDE SERPL-SCNC: 93 MMOL/L (ref 97–108)
CO2 SERPL-SCNC: 28 MMOL/L (ref 21–32)
CO2 SERPL-SCNC: 30 MMOL/L (ref 21–32)
CREAT SERPL-MCNC: 2.65 MG/DL (ref 0.7–1.3)
CREAT SERPL-MCNC: 2.66 MG/DL (ref 0.7–1.3)
CV B1 AB TITR SER CF: NEGATIVE
CV B2 AB TITR SER CF: NEGATIVE
CV B3 AB TITR SER CF: NEGATIVE
CV B4 AB TITR SER CF: NEGATIVE
CV B5 AB TITR SER CF: NEGATIVE
CV B6 AB TITR SER CF: NEGATIVE
DIFFERENTIAL METHOD BLD: ABNORMAL
EOSINOPHIL # BLD: 0 K/UL (ref 0–0.4)
EOSINOPHIL NFR BLD: 0 % (ref 0–7)
ERYTHROCYTE [DISTWIDTH] IN BLOOD BY AUTOMATED COUNT: 18 % (ref 11.5–14.5)
GLOBULIN SER CALC-MCNC: 3.3 G/DL (ref 2–4)
GLOBULIN SER CALC-MCNC: 3.4 G/DL (ref 2–4)
GLUCOSE BLD STRIP.AUTO-MCNC: 104 MG/DL (ref 65–117)
GLUCOSE BLD STRIP.AUTO-MCNC: 107 MG/DL (ref 65–117)
GLUCOSE BLD STRIP.AUTO-MCNC: 116 MG/DL (ref 65–117)
GLUCOSE BLD STRIP.AUTO-MCNC: 121 MG/DL (ref 65–117)
GLUCOSE BLD STRIP.AUTO-MCNC: 137 MG/DL (ref 65–117)
GLUCOSE BLD STRIP.AUTO-MCNC: 148 MG/DL (ref 65–117)
GLUCOSE BLD STRIP.AUTO-MCNC: 155 MG/DL (ref 65–117)
GLUCOSE BLD STRIP.AUTO-MCNC: 168 MG/DL (ref 65–117)
GLUCOSE BLD STRIP.AUTO-MCNC: 172 MG/DL (ref 65–117)
GLUCOSE BLD STRIP.AUTO-MCNC: 211 MG/DL (ref 65–117)
GLUCOSE BLD STRIP.AUTO-MCNC: 218 MG/DL (ref 65–117)
GLUCOSE BLD STRIP.AUTO-MCNC: 237 MG/DL (ref 65–117)
GLUCOSE BLD STRIP.AUTO-MCNC: 242 MG/DL (ref 65–117)
GLUCOSE BLD STRIP.AUTO-MCNC: 258 MG/DL (ref 65–117)
GLUCOSE BLD STRIP.AUTO-MCNC: 263 MG/DL (ref 65–117)
GLUCOSE BLD STRIP.AUTO-MCNC: 295 MG/DL (ref 65–117)
GLUCOSE BLD STRIP.AUTO-MCNC: 96 MG/DL (ref 65–117)
GLUCOSE SERPL-MCNC: 124 MG/DL (ref 65–100)
GLUCOSE SERPL-MCNC: 126 MG/DL (ref 65–100)
HCT VFR BLD AUTO: 32.1 % (ref 36.6–50.3)
HGB BLD-MCNC: 9.6 G/DL (ref 12.1–17)
IMM GRANULOCYTES # BLD AUTO: 0.1 K/UL (ref 0–0.04)
IMM GRANULOCYTES NFR BLD AUTO: 1 % (ref 0–0.5)
LYMPHOCYTES # BLD: 0.4 K/UL (ref 0.8–3.5)
LYMPHOCYTES NFR BLD: 4 % (ref 12–49)
M PNEUMO IGG SER IA-ACNC: 211 U/ML (ref 0–99)
M PNEUMO IGM SER IA-ACNC: <770 U/ML (ref 0–769)
MAGNESIUM SERPL-MCNC: 2.9 MG/DL (ref 1.6–2.4)
MCH RBC QN AUTO: 22.3 PG (ref 26–34)
MCHC RBC AUTO-ENTMCNC: 29.9 G/DL (ref 30–36.5)
MCV RBC AUTO: 74.5 FL (ref 80–99)
MONOCYTES # BLD: 0.5 K/UL (ref 0–1)
MONOCYTES NFR BLD: 5 % (ref 5–13)
NEUTS SEG # BLD: 9.2 K/UL (ref 1.8–8)
NEUTS SEG NFR BLD: 89 % (ref 32–75)
NRBC # BLD: 0 K/UL (ref 0–0.01)
NRBC BLD-RTO: 0 PER 100 WBC
PHOSPHATE SERPL-MCNC: 5.4 MG/DL (ref 2.6–4.7)
PLATELET # BLD AUTO: 337 K/UL (ref 150–400)
PMV BLD AUTO: 10.6 FL (ref 8.9–12.9)
POTASSIUM SERPL-SCNC: 3.4 MMOL/L (ref 3.5–5.1)
POTASSIUM SERPL-SCNC: 3.6 MMOL/L (ref 3.5–5.1)
PROCALCITONIN SERPL-MCNC: 0.39 NG/ML
PROT SERPL-MCNC: 7.7 G/DL (ref 6.4–8.2)
PROT SERPL-MCNC: 8 G/DL (ref 6.4–8.2)
RBC # BLD AUTO: 4.31 M/UL (ref 4.1–5.7)
RBC MORPH BLD: ABNORMAL
SERVICE CMNT-IMP: ABNORMAL
SERVICE CMNT-IMP: NORMAL
SODIUM SERPL-SCNC: 134 MMOL/L (ref 136–145)
SODIUM SERPL-SCNC: 135 MMOL/L (ref 136–145)
WBC # BLD AUTO: 10.3 K/UL (ref 4.1–11.1)

## 2022-12-29 PROCEDURE — 74011250637 HC RX REV CODE- 250/637: Performed by: STUDENT IN AN ORGANIZED HEALTH CARE EDUCATION/TRAINING PROGRAM

## 2022-12-29 PROCEDURE — 84100 ASSAY OF PHOSPHORUS: CPT

## 2022-12-29 PROCEDURE — 85025 COMPLETE CBC W/AUTO DIFF WBC: CPT

## 2022-12-29 PROCEDURE — 99233 SBSQ HOSP IP/OBS HIGH 50: CPT | Performed by: INTERNAL MEDICINE

## 2022-12-29 PROCEDURE — 74011250636 HC RX REV CODE- 250/636: Performed by: NURSE PRACTITIONER

## 2022-12-29 PROCEDURE — 84145 PROCALCITONIN (PCT): CPT

## 2022-12-29 PROCEDURE — 36415 COLL VENOUS BLD VENIPUNCTURE: CPT

## 2022-12-29 PROCEDURE — 74011000250 HC RX REV CODE- 250: Performed by: STUDENT IN AN ORGANIZED HEALTH CARE EDUCATION/TRAINING PROGRAM

## 2022-12-29 PROCEDURE — 82962 GLUCOSE BLOOD TEST: CPT

## 2022-12-29 PROCEDURE — 74011250637 HC RX REV CODE- 250/637: Performed by: FAMILY MEDICINE

## 2022-12-29 PROCEDURE — 74011636637 HC RX REV CODE- 636/637: Performed by: STUDENT IN AN ORGANIZED HEALTH CARE EDUCATION/TRAINING PROGRAM

## 2022-12-29 PROCEDURE — 65620000000 HC RM CCU GENERAL

## 2022-12-29 PROCEDURE — 97530 THERAPEUTIC ACTIVITIES: CPT

## 2022-12-29 PROCEDURE — 80053 COMPREHEN METABOLIC PANEL: CPT

## 2022-12-29 PROCEDURE — 74011250636 HC RX REV CODE- 250/636: Performed by: STUDENT IN AN ORGANIZED HEALTH CARE EDUCATION/TRAINING PROGRAM

## 2022-12-29 PROCEDURE — 94640 AIRWAY INHALATION TREATMENT: CPT

## 2022-12-29 PROCEDURE — A9538 TC99M PYROPHOSPHATE: HCPCS

## 2022-12-29 PROCEDURE — 99232 SBSQ HOSP IP/OBS MODERATE 35: CPT | Performed by: NURSE PRACTITIONER

## 2022-12-29 PROCEDURE — 99291 CRITICAL CARE FIRST HOUR: CPT | Performed by: INTERNAL MEDICINE

## 2022-12-29 PROCEDURE — 74011250637 HC RX REV CODE- 250/637: Performed by: INTERNAL MEDICINE

## 2022-12-29 PROCEDURE — 83735 ASSAY OF MAGNESIUM: CPT

## 2022-12-29 RX ORDER — INSULIN GLARGINE 100 [IU]/ML
40 INJECTION, SOLUTION SUBCUTANEOUS DAILY
Status: DISCONTINUED | OUTPATIENT
Start: 2022-12-29 | End: 2022-12-31

## 2022-12-29 RX ORDER — PANTOPRAZOLE SODIUM 40 MG/1
40 TABLET, DELAYED RELEASE ORAL
Status: DISCONTINUED | OUTPATIENT
Start: 2022-12-29 | End: 2023-01-19 | Stop reason: HOSPADM

## 2022-12-29 RX ORDER — POTASSIUM CHLORIDE 750 MG/1
40 TABLET, FILM COATED, EXTENDED RELEASE ORAL 2 TIMES DAILY
Status: DISCONTINUED | OUTPATIENT
Start: 2022-12-29 | End: 2023-01-16

## 2022-12-29 RX ADMIN — Medication: at 17:29

## 2022-12-29 RX ADMIN — IPRATROPIUM BROMIDE AND ALBUTEROL SULFATE 3 ML: .5; 3 SOLUTION RESPIRATORY (INHALATION) at 02:24

## 2022-12-29 RX ADMIN — BUMETANIDE 1 MG/HR: 0.25 INJECTION, SOLUTION INTRAMUSCULAR; INTRAVENOUS at 08:12

## 2022-12-29 RX ADMIN — Medication 6 UNITS: at 21:34

## 2022-12-29 RX ADMIN — HYDROCORTISONE SODIUM SUCCINATE 50 MG: 100 INJECTION, POWDER, FOR SOLUTION INTRAMUSCULAR; INTRAVENOUS at 08:03

## 2022-12-29 RX ADMIN — Medication: at 08:04

## 2022-12-29 RX ADMIN — IPRATROPIUM BROMIDE 2 SPRAY: 21 SPRAY, METERED NASAL at 21:35

## 2022-12-29 RX ADMIN — POTASSIUM CHLORIDE 40 MEQ: 750 TABLET, FILM COATED, EXTENDED RELEASE ORAL at 06:12

## 2022-12-29 RX ADMIN — INSULIN GLARGINE 40 UNITS: 100 INJECTION, SOLUTION SUBCUTANEOUS at 08:27

## 2022-12-29 RX ADMIN — DIPHENHYDRAMINE HYDROCHLORIDE 50 MG: 25 CAPSULE ORAL at 21:34

## 2022-12-29 RX ADMIN — QUETIAPINE FUMARATE 50 MG: 25 TABLET ORAL at 21:34

## 2022-12-29 RX ADMIN — IPRATROPIUM BROMIDE AND ALBUTEROL SULFATE 3 ML: .5; 3 SOLUTION RESPIRATORY (INHALATION) at 20:05

## 2022-12-29 RX ADMIN — PANTOPRAZOLE SODIUM 40 MG: 40 TABLET, DELAYED RELEASE ORAL at 12:41

## 2022-12-29 RX ADMIN — Medication 3 UNITS: at 08:20

## 2022-12-29 RX ADMIN — HEPARIN SODIUM 5000 UNITS: 5000 INJECTION INTRAVENOUS; SUBCUTANEOUS at 08:04

## 2022-12-29 RX ADMIN — POTASSIUM CHLORIDE 40 MEQ: 750 TABLET, FILM COATED, EXTENDED RELEASE ORAL at 17:32

## 2022-12-29 RX ADMIN — SODIUM CHLORIDE, PRESERVATIVE FREE 10 ML: 5 INJECTION INTRAVENOUS at 13:53

## 2022-12-29 RX ADMIN — CLOPIDOGREL BISULFATE 75 MG: 75 TABLET ORAL at 09:22

## 2022-12-29 RX ADMIN — SODIUM CHLORIDE, PRESERVATIVE FREE 10 ML: 5 INJECTION INTRAVENOUS at 21:34

## 2022-12-29 RX ADMIN — Medication 3 UNITS: at 18:56

## 2022-12-29 RX ADMIN — HYDROCORTISONE SODIUM SUCCINATE 50 MG: 100 INJECTION, POWDER, FOR SOLUTION INTRAMUSCULAR; INTRAVENOUS at 21:34

## 2022-12-29 RX ADMIN — IPRATROPIUM BROMIDE 2 SPRAY: 21 SPRAY, METERED NASAL at 09:19

## 2022-12-29 RX ADMIN — SODIUM CHLORIDE, PRESERVATIVE FREE 10 ML: 5 INJECTION INTRAVENOUS at 05:00

## 2022-12-29 RX ADMIN — IPRATROPIUM BROMIDE AND ALBUTEROL SULFATE 3 ML: .5; 3 SOLUTION RESPIRATORY (INHALATION) at 08:56

## 2022-12-29 RX ADMIN — HEPARIN SODIUM 5000 UNITS: 5000 INJECTION INTRAVENOUS; SUBCUTANEOUS at 21:34

## 2022-12-29 RX ADMIN — TAMSULOSIN HYDROCHLORIDE 0.4 MG: 0.4 CAPSULE ORAL at 08:04

## 2022-12-29 RX ADMIN — ASPIRIN 81 MG: 81 TABLET, COATED ORAL at 09:22

## 2022-12-29 NOTE — DIABETES MGMT
[address] [place] [address]  DIABETES MANAGEMENT CONSULT    Consulted by  Leonard Michelle MD   for advanced nursing evaluation and care for inpatient blood glucose management. Evaluation and Action Plan   Dominic Welsh is a 71year old gentleman, with Type 2 Diabetes with a recent A1C of 6.5%, who was admitted with acute hypoxic respiratory s/t acute on chronic HFrEF and TANNER. He was admitted 2 weeks ago for similar complaint and antihyperglycemic agents were adjusted on discharge. Metformin was stopped due to decline in GFR and Jardiance switched to [place] (unclear why). He was compliant with his [place], [place] and Glipizide up until day prior to this hospitalization. His glucose was significantly elevated at 458 on admission, s/t insulin resistance from acute HFrEF, elevated lactate and impaired perfusion. GFR is 27 and would avoid therefore avoid GUILLORY. Please start basal/bolus insulin therapy at this time and target an inpatient BG goal of 140-180mg/dl. BG trends since admission have fluctuated and required reducing basal insulin due to lower normal BG in 80s. Noted prandial hyperglycemia in past 24h-Fasting ; Would adjust basal back to 12 units daily-PO intake poor today per notes-Basal insulin adjusted to 20 units daily on 12/28/22. Marked hyperglycemia noted with invitation of hydrocortisone on 12/28/22 that required mgmt by insulin infusion evening 12/28/22. Noted Lantus given this AM and insulin infusion is still going. Reached out to intensivist to stop insulin infusion as BG trends consistently dropping for past 3h. Management Rationale Action Plan   Medication   Basal needs-Insulin infusion 12/29/22-?stopping today To cover for diabetes: 0.3 u/kg dosing=15 units basal    To cover for steroid: cover with 0.15/kg dosing and add to current basal dose. =16 units additional basal to cover for both steroid doses  =  Lantus 30 units daily, if BG remains >200, advance dose daily by 20%. Nutritional needs Using low sensitivity based on low BMI CONTINUE  low dose humalog 0.05 units/kg/meal  3 units Humalog/meal    Hold if patient is NPO or consumes less than 50% of carbohydrates on meal tray    Advance by 2 units daily for persistent pre-prandial hyperglycemia     Corrective insulin Using normal sensitivity based on  Normal Sensitivity ACHS   Additional Recommendations. POC glucose ACHS    Consistent carbohydrate diet (60 grams CHO/meal). Patient interested in Glucerna    3. Please hold oral antihyperglycemic agents in the inpatient setting,              Initial Presentation   Tyron Red is a 71 y.o. male who presented to the ED 12/22/22 with a 3-4 day c/o gradually worsening dyspnea, fatigue, chills, constipation and RLQ pain. He endorses difficulty voiding despite compliance with lasix. In the ED, he was hypoxic and started on supplemental O2 and diuresis. LAB: WBC 25.1, , GFR 25, Lac 5.2, Trop 1096, BNP 6905  CXR: Widespread interstitial opacities bilateral mid to lower lung opacities  concerning for edema and/or atypical infection. Small bilateral pleural  effusions.   CT: CT revealed distended bladder  EKG: Sinus tachycardia   Biatrial enlargement   Rightward axis   Marked ST abnormality, possible inferior subendocardial injury   Marked ST abnormality, possible lateral subendocardial injury   Abnormal ECG   Did have a hospital admission for HFrEF 12/11/22-12/16/22    HX:   Past Medical History:   Diagnosis Date    CAD (coronary artery disease) 11/10/2016    NSTEMI & 2 stents    Deafness 10/28/2012    DM (diabetes mellitus) ([facility] [address]     Elevated cholesterol     Hypertension     NSTEMI (non-ST elevated myocardial infarction) ([facility] [address] 11/10/2016    CKD  CAD with CABG 2018    INITIAL DX:   Sepsis ([facility] [address] [A41.9]     Current Treatment     TX: IVF, Diuresis, Supplemental O2    Hospital Course   Clinical progress has been complicated by:   48/01: Initial admission with hospitalist service but with progressive hypoxia early requiring CCU transfer. Elevated troponin/BNP. BPH and hydronephrosis. Pt pan cultured, given dose of abx and dose of IVP lasix. Pt placed on BiPAP w/ improvement in oxygenation. 12/27: cardiology suggesting myocarditis as reason for cardiomyopathy systolic HF>   39/45: steroid regimen started. Diabetes History   Type 2 Diabetes  Ambulatory BG management provided by: PCP Sally Rodriguez MD    Diabetes-related Medical History  Acute complications  Acute hyperglycemia  Neurological complications  Peripheral neuropathy  Microvascular disease  Nephropathy  Macrovascular disease  CAD  Other associated conditions     CHF    Diabetes Medication History  Key Antihyperglycemic Medications               dapagliflozin (FARXIGA) 10 mg tab tablet (Taking) Take 1 Tablet by mouth daily. glipiZIDE (GLUCOTROL) 5 mg tablet (Taking) Take 1 tablet by mouth twice daily    Januvia 50 mg tablet (Taking) Take 1 tablet by mouth once daily             Diabetes self-management practices:   Eating pattern   Eats 3 small meals daily  [x] Breakfast  2 Eggs, Coffee  [x] Lunch   [place]  [x] Dinner   \" Food\" Bread, rice, lentils   [x] Bedtime  [place]  [x] Snacks   Afternoon snack  [x] Beverages  Water, Coffee  Physical activity pattern   Sedentary   Monitoring pattern  Does not check BG  Taking medications pattern  [x] Consistent administration  [x] Affordable  Social determinants of health impacting diabetes self-management practices   Concerned that you need to know more about how to stay healthy with diabetes  Overall evaluation:    [x] Achieving A1c target with drug therapy & self-care practices    Subjective   I took my medicine yesterday.      Objective   Physical exam  General Underweight male in mild distress/ill-appearing.  Conversant and cooperative  Neuro  Alert, oriented   Vital Signs Visit Vitals  /61   Pulse 97   Temp 98.4 °F (36.9 °C)   Resp 22   Ht 5' 9\" (1.753 m)   Wt 51.4 kg (113 lb 5.1 oz)   SpO2 97%   BMI 16.73 kg/m²     Skin  Warm and dry. Acanthosis noted along neckline. No lipohypertrophy or lipoatrophy noted at injection sites   Heart   Regular rate and rhythm. No murmurs, rubs or gallops  Lungs  Clear to auscultation without rales or rhonchi  Extremities No foot wounds        Laboratory  Recent Labs     22  0411 22  1558 22  0354 22  1436 22  0407   * 237* 196*   < > 202*   AGAP 14 12 13   < > 9   WBC 10.3  --  9.3  --  6.2   CREA 2.65* 2.77* 2.76*   < > 2.82*   AST 81* 152* 258*  --  701*   * 466* 568*  --  701*    < > = values in this interval not displayed. Factors impacting BG management  Factor Dose Comments   Nutrition:  Standard meals     60 grams/meal      Drugs:  Vasopressor load   Levophed  Affects insulin delivery     Heart Failure EF 20-30%         Other:   Kidney function GFR 27      Blood glucose pattern    Significant diabetes-related events over the past 24-72 hours  A1C  6.5% 22 (down from 7.0 10/12/22)  Fasting B  Pre-prandial: >200  Basal: Lantus 8units--> adjusted to 20 units on 22  Bolus: 3 units TID  Correction: normal sensitivity    Last admission was on Jardiance 10, alogliptin 12.5, 12 units Lantus, correctional humalog- experienced pre-prandial hyperglycemia.       Assessment and Nursing Intervention   Nursing Diagnosis Risk for unstable blood glucose pattern   Nursing Intervention Domain 525 Decision-making Support   Nursing Interventions Examined current inpatient diabetes/blood glucose control   Explored factors facilitating and impeding inpatient management  Explored corrective strategies with patient and responsible inpatient provider   Informed patient of rational for insulin strategy while hospitalized     Nursing Diagnosis 90344 Ineffective Health Management   Nursing Intervention Domain 5257 Decision-making Support   Nursing Interventions Identified diabetes self-management practices impeding diabetes control  Discussed diabetes survival skills related to  Healthy Plate eating plan; given handouts  Role of physical activity in improving insulin sensitivity and action  Procedure for blood glucose monitoring & options for low-cost products  Medications plan at discharge     Billing Code(s)   [x] [phone]    Before making these care recommendations, I personally reviewed the hospitalization record, including notes, laboratory & diagnostic data and current medications, and examined the patient at the bedside (circumstances permitting) before making care recommendations. More than fifty (50) percent of the time was spent in patient counseling and/or care coordination.   Total minutes: 7400 [address] [facility]  Diabetes Clinical Nurse Specialist  Program for Diabetes Health  Access via [facility]

## 2022-12-29 NOTE — PROGRESS NOTES
CRITICAL CARE NOTE      Name: Lorenzo Foreman   : 1950   MRN: [id number]   Date: 2022      Reason for ICU Admission: Cardiogenic shock     ICU PROBLEM LIST   Acute on chronic HFrEF (25-30%)  Acute hypoxic respiratory failure  TANNER on CKD3  Cardiogenic shock, resolving  Lactic acidosis  NSTEMI  CAD  DM  transaminitis    HISTORY OF PRESENT ILLNESS:   Pt is a 71 y.o M w/ PMH as noted above who presented to ED due to progressive dyspnea. History obtained from pt and spouse at bedside. Onset 3-4 days ago, DONALDSON, abdominal discomfort and distension with associated anorexia not improved by increasing home lasix dose. States difficulty voiding despite lasix doses. Of note pt w/ recent frequent admissions for HFrEF. Denies sick contacts, CP, palpitations,     Pt tachycardic, hypotensive, hypoxemic on presentation. Labs w/ troponemia, BNP above baseline, elevated lactate, and TANNER. Imaging w/ bilateral pulmonary edema, bl pulmonary effusions, bladder distension 2/2 BPH and hydronephrosis. Pt pan cultured, given dose of abx and dose of IVP lasix. Pt placed on BiPAP w/ improvement in oxygenation. Vasquez placed w/ bladder decompression. Pt initially admitted to hospitalist service, however CCM consulted due to worsening clinical status. Admitted to CCU. Respiratory status improved with diuresis, weaned vasopressors. 24 HOUR EVENTS:    - start mini pulse steroids. Req inuslin gtt. NEUROLOGICAL:    - Mentation appropriate  - Monitor for acute change  Seroquel 50mg qhs    PULMONOLOGY:   - O2 per NC PRN to maintain spO2 >92%  - Suspect may require home O2 as becomes hypoxemic w/ activity  - Monitor bl effusions      CARDIOVASCULAR:   - C/w dobutamine and bumex gtt  - TTE  25% with LAD, reduce TAPSE 1.1 cm, mild MR,   - Holding GDMT due to hypotension  - ASA, plavix, statin  - Cariology and HF consults - work up for HFE - ?cardiomyopathy.   Minipulse Steroids per Cardiology - 1mg/kg QD  Restart ASA/plavix, plan for cardiac MRI in coming week. GASTROINTESTINAL:   - Cardiac, diabetic diet  - Bowel regimen for constipation  - shock liver, trending down    RENAL/ELECTROLYTE/FLUIDS:   - Strict I/Os  - Suspect initially post obstructive failure, now Cr uptrending w/ likely ATN  - continue bumex as above, start diamox to support diuresis  - per Urology consult for obstruction, donnelly to remain in place for at least 7-10 days  - US w/ resolving hydronephrosis  - C/w flomax  - Nephrology consulted in event of TANNER progression    ENDOCRINE:   -insulin gtt, incr lantus 40U. Plan to move off gtt in next 24 hours.  Increased req with steroids  - Glucose goal 120-180    HEMATOLOGY/ONCOLOGY:   Change to hep ppx  AoCD     ID/MICRO:   UA negative - + blood, LE  Urine Cx negative -- stop abx, completed 5 d zosyn    PCT neg  ICU DAILY CHECKLIST     Code Status:DNR/DNI  DVT Prophylaxis: antoni  T/L/D: PIV, donnelly  SUP: N/A  Diet: cardiac, diabetic  Activity Level:ad jose f  ABCDEF Bundle/Checklist Completed:Yes  Disposition: Stay in ICU  Multidisciplinary Rounds Completed:yes  Patient/Family Updated: Yes    [place]   As noted above    SUBJECTIVE:   As noted above    Review of Systems:       OBJECTIVE:     Labs and Data: Reviewed 22  Medications: Reviewed 22  Imaging: Reviewed 22    General - in bed, chornically ill, edematous  HEENT- pupils equal, EOMI, anicteric  Neuro - follows basic commands, no focal deficit  CV - RRR  Resp - CTAB, shallow lung volumes  Abd - soft, nontender, no guarding   - + donnelly  MSK- intact, no sacral ulcer       Visit Vitals  BP (!) 102/53   Pulse 94   Temp 98.1 °F (36.7 °C)   Resp 14   Ht 5' 9\" (1.753 m)   Wt 51.4 kg (113 lb 5.1 oz)   SpO2 97%   BMI 16.73 kg/m²    O2 Flow Rate (L/min): 2 l/min O2 Device: None (Room air) Temp (24hrs), Av.9 °F (37.2 °C), Min:98.1 °F (36.7 °C), Max:99.5 °F (37.5 °C)    CVP (mmHg): 7 mmHg (22 0500)      Intake/Output: Intake/Output Summary (Last 24 hours) at 12/29/2022 0811  Last data filed at 12/29/2022 0700  Gross per 24 hour   Intake 1032.75 ml   Output 2645 ml   Net -1612.25 ml       Imaging    12/11/22    ECHO ADULT FOLLOW-UP OR LIMITED 12/14/2022 12/14/2022    Interpretation Summary    Left Ventricle: Severely reduced left ventricular systolic function with a visually estimated EF of 25 - 30%. Not well visualized. Left ventricle size is normal. Normal wall thickness. Normal wall motion. Normal diastolic function. Mitral Valve: Thickened leaflet. Moderate annular calcification of the mitral valve. Mild regurgitation. Contrast used: Definity. Note: image quality is poor, and even with Definity contrast, EF is very difficult to estimate, and the reported figure is approximate at best. Consider alternative imaging modalities such as MUGA or cardiac MRI to more accurately assess. Signed by: Shira Wang MD on 12/14/2022  4:46 PM           CRITICAL CARE DOCUMENTATION  I had a face to face encounter with the patient, reviewed and interpreted patient data including clinical events, labs, images, vital signs, I/O's, and examined patient. I have discussed the case and the plan and management of the patient's care with the consulting services, the bedside nurses and the respiratory therapist.      NOTE OF PERSONAL INVOLVEMENT IN CARE   This patient has a high probability of imminent, clinically significant deterioration, which requires the highest level of preparedness to intervene urgently. I participated in the decision-making and personally managed or directed the management of the following life and organ supporting interventions that required my frequent assessment to treat or prevent imminent deterioration. I personally spent 30 minutes of critical care time. This is time spent at this critically ill patient's bedside actively involved in patient care as well as the coordination of care.   This does not include any procedural time which has been billed separately.     Mattie Miranda DO  Staff Intensivist  [facility]  12/29/2022

## 2022-12-29 NOTE — PROGRESS NOTES
[facility]  Cardiology Care Note                  []Initial visit     [x]Established visit     Patient Name: Willie Guardado - [facility] [place]:[id number]  Primary Cardiologist: Kishore Rosas MD  Consulting Cardiologist: Francine Morales MD     Reason for initial visit:  dyspnea, decompensated HF, tachycardia. HPI:     CAD with CABG 6/9/2018. NSTEMI in November 2016 and resolved pulmonary HTN. Stent to circumflex an LAD in 2016. Stress echo in 2018 with a drop in BP with exercise and a drop in EF. Cath on 6/22/18 that showed even with a 5F catheter, he dampened with suspected ostial 3 vessel disease. Echo 3/27/2021 = EF 55 to 60% mild AR MR TR LAE MAC  Nuclear 3/18/2021 EF 64% medium size defect apical and inferior lateral reversible ischemia medium defect mid and inferolateral nonreversible appear to be infarction intermediate risk stress test .  PCI 02/28/2022--PCI of native left main, proximal circumflex and OM1    Over the past 2 months has been reporting progressive dyspnea, atypical chest discomfort and significant tachycardia with progressive deterioration in LV function. Previously thought to be secondary to ischemic cardiomyopathy but overall picture is more consistent with likely myocarditis/alternative cause of heart failure. Now presenting with obstructive uropathy causing bilateral hydronephrosis and TANNER and subsequently having transient hypotension from sepsis complicating an otherwise picture of cardiogenic shock with multiorgan dysfunction. SUBJECTIVE:    Condition be short of breath and orthopnea though better. Poor appetite. Extremely tired and fatigued        Assessment and Plan       Cardiogenic shock. 2  CAD native vessel with patent PULLIAM to LAD, patent vein graft to RCA. Restart aspirin monotherapy.   Plavix is not needed at this time since last PCI was almost 10 months ago.    3  Cardiomyopathy systolic heart failure: Rapid deterioration in LV function with overall WMA on recent echo appears not to be in teritorry of ischemia suggesting alternative mechanism fo cardiomyopathy(stress SMP vs myocarditis). Notably apex is significantly hypokinetic but LIMA to LAD is patent on recent cardiac catheterization hence cardiomyopathy is unlikely to be ischemia driven. Moreover with progressive clinical deterioration and features suggestive of cardiogenic shock, possibility of myocarditis is likely. Agree with institution of IV steroid trial.  We will consider cardiac MRI early next week to assess for degree of myocardial edema. Edema. Defer myocardial biopsy at this point in time. Viral and immune panel ordered per ADHF services. hsCRP high    4. Aortic stenosis mild     5. Diabetes mellitus type 2 defer management to primary team.    6 .  Acute liver injury likely secondary to hypotension--improving    7. 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 Baseline creatinine about 1.5 to 1.7 mg/dL with superimposed TANNER likely secondary from recent hypotension. Slight improvement in creatinine    8. PAD: S/P Right SFA PTCA. Needs follow up with Dr Tad Lipscomb in 2 -3 months with MARIANELA/Dopplers    9. Bladder obstruction contributing to bilateral hydronephrosis and TANNER    Evie Kendall  is critically ill in ICU. He had multiorgan dysfunction. His blood pressures appear to be now stabilized with dobutamine drip renal function is slightly better than there is improvement in liver function tests as well. Overall this indicates adequate tissue perfusion. His most recent echocardiogram does suggest evidence of biventricular dysfunction with normal RV function appearing worse compared to previously. Clinical course does suggest possibility of (sub)acute myocarditis.   After multidisciplinary discussion between cardiology, advanced heart failure service, intensivist it is felt that it would be reasonable to try steroids with a provisional diagnosis of myocarditis as a salvage therapy. Continue dobutamine for now. No beta-blockers, Corlanor, Entresto or other guideline directed medical therapy at this point of time given poor cardiac hemodynamics. I personally spent 35 minutes of critical care time. This is time spent at this critically ill patient's bedside / unit / floor actively involved in patient care as well as the coordination of care. This does not include any procedural time which has been billed separately. I had a face to face encounter with the patient, reviewed and interpreted patient data including clinical events, labs, images, vital signs, I/O's, and examined patient. I have discussed the case and the plan and management of the patient's care with the consulting services and care team.  This patient has a high probability of imminent and/or clinically significant deterioration. ____________________________________________________________    Cardiac testing  12/22/22    ECHO ADULT COMPLETE 12/26/2022 12/26/2022    Interpretation Summary    Left Ventricle: Severely reduced left ventricular systolic function with a visually estimated EF of 25 - 30%. Left ventricle size is normal. Normal wall thickness. There are regional wall motion abnormalities. Grade II diastolic dysfunction with increased LAP. Right Ventricle: Moderately reduced systolic function. TAPSE is abnormal. TAPSE is 1.1 cm. Aortic Valve: Mild stenosis of the aortic valve. AV peak gradient is 13 mmHg. AV peak velocity is 1.8 m/s. Mitral Valve: Not well visualized. Moderate annular calcification at the posterior leaflet of the mitral valve. Mild to moderate regurgitation. Tricuspid Valve: Mildly elevated RVSP. Left Atrium: Left atrium is moderately dilated.     Signed by: Mj Barron MD on 12/26/2022  3:13 PM         10/27/22    NUCLEAR CARDIAC STRESS TEST 10/27/2022 11/1/2022    Interpretation Summary    Nuclear Findings: The study is positive for myocardial ischemia. The defect appears to be ischemia. The areas of ischemia are similar to 3/15/2021 study. Nuclear Findings: There is a moderate severity left ventricular stress perfusion defect that is medium in size present in the mid to distal inferolateral and anterolateral segment(s) that is reversible. The defect is consistent with abnormal perfusion in the LCx territory. There is normal wall motion in the defect area. The defect appears to be probable ischemia. There is a moderate severity second left ventricular stress perfusion defect. The defect appears to be infarction. Nuclear Findings: Abnormal left ventricular systolic function post-stress. Septal dyskinesis. Post-stress ejection fraction is 39%. ECG: Resting ECG demonstrates normal sinus rhythm. ECG: Stress ECG was negative for ischemia. Stress Test: A pharmacological stress test was performed using lexiscan. Blood pressure demonstrated a normal response and heart rate demonstrated a normal response to stress. The patient's heart rate recovery was normal. The patient reported no symptoms during the stress test.    Signed by: Shelley Huffman MD on 10/27/2022  4:45 PM    02/28/22    CARDIAC PROCEDURE 02/28/2022 2/28/2022    Conclusion  Findings  1. Severe native multivessel coronary disease  2. Patent LIMA to LAD, vein graft to distal RCA with severe disease in the native vessels  3. Occluded vein graft to OM 2. Native OM 2 severely diseased along with proximal to mid circumflex as well as distal left main. Overall the vessel is significantly remodeled negatively. Additionally there is significant disease in the first marginal branch which is even smaller as well as AV groove branch right at the takeoff of OM2.   Single stent 2.25 x 28 mm Xience was placed extending from the OM 2 into the circumflex into the distal left main and sequentially dilated with 2.25 noncompliant, 2 5 noncompliant, 3 oh noncompliant and 3 5 noncompliant to perform multilevel POT to manage multiple bifurcations on the way. Atherectomy was considered but given small size of the vessels, was not deemed to be absolutely safe for the obtuse marginal lesion. Overall stent expanded fairly well related to the size of the vessels. 4.  Normal LVEDP    Access right radial: Small vessel, tortuous} brachiocephalic  Contrast 65 cc    Recommendations  1. Plavix based dual antiplatelet therapy  2. Guideline directed medical therapy for secondary prevention of coronary events    Signed by: Jaylene Zarate MD on 2/28/2022  4:22 PM        Most recent HS troponins:  No results for input(s): TROPHS in the last 72 hours. No lab exists for component:  CKMB      Review of Systems    [x]All other systems reviewed and all negative except as written in HPI    [] Patient unable to provide secondary to condition         Past Medical History:   Diagnosis Date    CAD (coronary artery disease) 11/10/2016    NSTEMI & 2 stents    Deafness 10/28/2012    DM (diabetes mellitus) ([facility] [address]     Elevated cholesterol     Hypertension     NSTEMI (non-ST elevated myocardial infarction) ([facility] [address] 11/10/2016     Past Surgical History:   Procedure Laterality Date    COLONOSCOPY N/A 6/28/2018    COLONOSCOPY performed by Luisito Crump MD at [address] [place] [address] St  11/11/2016    2 stents     Social Hx:  reports that he has never smoked. He has never been exposed to tobacco smoke. He has never used smokeless tobacco. He reports current alcohol use of about 2.0 standard drinks per week. He reports that he does not use drugs. Family Hx: family history includes Elevated Lipids in his brother, brother, and brother; Heart Attack in his father; Heart Disease in his father; Hypertension in his mother; No Known Problems in his daughter, sister, and son.   No Known Allergies       OBJECTIVE:  Wt Readings from Last 3 Encounters:   12/29/22 113 lb 5.1 oz (51.4 kg)   12/19/22 129 lb (58.5 kg)   12/16/22 126 lb 8.7 oz (57.4 kg)       Intake/Output Summary (Last 24 hours) at 12/29/2022 0938  Last data filed at 12/29/2022 0800  Gross per 24 hour   Intake 1032.75 ml   Output 2645 ml   Net -1612.25 ml             Physical Exam    Vitals:   Vitals:    12/29/22 0700 12/29/22 0800 12/29/22 0856 12/29/22 0900   BP: (!) 102/53 (!) 121/58  125/64   Pulse: 94 94  96   Resp: 14 14  14   Temp: 98.1 °F (36.7 °C) 97.9 °F (36.6 °C)  97.9 °F (36.6 °C)   SpO2: 97% 95% 99% 100%   Weight:       Height:         Telemetry: normal sinus rhythm    /64   Pulse 96   Temp 97.9 °F (36.6 °C)   Resp 14   Ht 5' 9\" (1.753 m)   Wt 113 lb 5.1 oz (51.4 kg)   SpO2 100%   BMI 16.73 kg/m²   General:    Alert, cooperative, no distress. Psychiatric:    Normal Mood and affect    Eye/ENT:      Pupils equal, No asymmetry, Conjunctival pink. Able to hear voice at normal amplitude   Lungs:      Visibly symmetric chest expansion, No palpable tenderness. clear   Heart[de-identified]    Regular rate and rhythm, S1, S2 normal, no murmur, click, rub or gallop. No JVD, Normal palpable peripheral pulses. No cyanosis   Abdomen:     Soft, non-tender. Bowel sounds normal. No masses,  No      organomegaly. Extremities:   Extremities normal, atraumatic, no edema. Neurologic:   CN II-XII grossly intact. No gross focal deficits           Data Review:     Radiology:   XR Results (most recent):  Results from Hospital Encounter encounter on 12/22/22    XR CHEST PORT    Narrative  EXAM: XR CHEST PORT    DATE: 12/24/2022 6:04 PM    INDICATION: CVC placement    COMPARISON: Chest radiograph December 24, 2022 at 1412 hours    FINDINGS: AP portable chest radiograph. There is a new RIGHT IJ central venous  catheter with the tip near the cavoatrial junction. Patient is status post  sternotomy and CABG. The heart is mildly enlarged but stable. The lungs are  hyperinflated.  There are persistent, small bilateral pleural effusions. There is  no pneumothorax. A skin fold projects over the RIGHT upper lung. The vascular  clarity is again mildly diminished. Impression  1. RIGHT IJ catheter is in satisfactory position with the tip at the cavoatrial  junction. No pneumothorax. 2. No change in small bilateral effusions. CT Results (most recent):  Results from Hospital Encounter encounter on 12/22/22    CT ABD PELV WO CONT    Narrative  EXAM: CT ABD PELV WO CONT    INDICATION: rlq abdominal pain    COMPARISON: 5/3/2014    IV CONTRAST: None. ORAL CONTRAST: None    TECHNIQUE:  Thin axial images were obtained through the abdomen and pelvis. Coronal and  sagittal reformats were generated. CT dose reduction was achieved through use of  a standardized protocol tailored for this examination and automatic exposure  control for dose modulation. The absence of intravenous contrast material reduces the sensitivity for  evaluation of the vasculature and solid organs. FINDINGS:  LOWER THORAX: Small bilateral pleural effusions. Partial collapse bases  LIVER: No mass. BILIARY TREE: Gallstones. No evidence of acute cholecystitis CBD is not dilated. SPLEEN: within normal limits. PANCREAS: No focal abnormality. ADRENALS: 19 mm right adrenal nodule unchanged,  KIDNEYS/URETERS: Mild bilateral hydronephrosis. No stone  STOMACH: Unremarkable. SMALL BOWEL: No dilatation or wall thickening. COLON: No dilatation or wall thickening. APPENDIX: Surgically absent  PERITONEUM: No ascites or pneumoperitoneum. RETROPERITONEUM: No lymphadenopathy or aortic aneurysm. Extensive vascular  calcifications  REPRODUCTIVE ORGANS: Mildly enlarged  URINARY BLADDER: Massive distention  BONES: No destructive bone lesion. ABDOMINAL WALL: Small umbilical hernia containing fat. ADDITIONAL COMMENTS: N/A    Impression  1. Bladder is massively distended with mild bilateral hydronephrosis.  May  indicate bladder outlet obstruction. Prostate is mildly enlarged  2. Gallstones. No acute cholecystitis  3. Small bilateral pleural effusions    MRI Results (most recent):  Results from Hospital Encounter encounter on 05/22/16    MRI LUMB SPINE WO CONT    Narrative  **Final Report**      ICD Codes / Adm. Diagnosis: 724.2  M54.5 / Lumbago  Low back pain  Examination:  MR Jasmina Dodge CON  - [id number] - May 22 2016  1:45PM  Accession No:  [id number]  Reason:  Low back pain      REPORT:  Indication: Pain and back extends into right leg to foot    Exam: MRI of the lumbar spine. Sequences include sagittal and axial T1 and  T2-weighted images. Sagittal STIR. Comparisons: April 27, 2016    Contrast: None    Findings: Alignment is normal. There is no evidence of marrow replacement or  acute fracture. Cord terminus is within normal limits. Paraspinous soft  tissues are within normal limits. T12-L1: No stenosis    L1-L2: There is desiccation of this disc with a small bulge but no stenosis    L2-L3: There is desiccation of this disc with a small bulge but no stenosis    L3-L4: There is mild left facet arthropathy but no stenosis    L4-L5: There is disc height loss with a small disc bulge. Facet arthropathy. No stenosis    L5-S1: There is disc height loss with a small disc bulge and left  paracentral disc extrusion extending inferiorly. This mildly distorts the  left S1 nerve root in the subarticular zone and left lateral recess. There  are facet degenerative changes with moderate left and mild-to-moderate right  foraminal narrowing    Impression  :  1. Multilevel degenerative change detailed by level above most significant  at L5-S1                Signing/Reading Doctor: Alison Ireland ([id number])  Approved: Alison Ireland ([id number])  May 23 2016  8:39AM        No results for input(s): CPK, TROIQ in the last 72 hours.     No lab exists for component: CKQMB, CPKMB, BMPP  Recent Labs     12/29/22  0411 12/28/22  1558 12/28/22  0354   * 131* 133* K 3.4* 4.0 3.3*   CL 93* 91* 92*   CO2 28 28 28   BUN 87* 74* 75*   CREA 2.65* 2.77* 2.76*   * 237* 196*   PHOS 5.4*  --  6.5*   CA 10.9* 10.5* 10.5*       Recent Labs     12/29/22  0411 12/28/22  0354   WBC 10.3 9.3   HGB 9.6* 9.7*   HCT 32.1* 32.6*    309       Recent Labs     12/29/22  0411 12/28/22  1558   * 185*         No results for input(s): CHOL, LDLC in the last 72 hours.     No lab exists for component: TGL, HDLC,  HBA1C  Recent Labs     12/27/22  1436   TSH 2.12               Current meds:    Current Facility-Administered Medications:     potassium chloride SR (KLOR-CON 10) tablet 40 mEq, 40 mEq, Oral, BID, Walker Montalvo MD, 40 mEq at 12/29/22 0612    insulin glargine (LANTUS) injection 40 Units, 40 Units, SubCUTAneous, DAILY, Amanda Randall DO, 40 Units at 12/29/22 0827    diphenhydrAMINE (BENADRYL) capsule 50 mg, 50 mg, Oral, QHS, Krishna Piper MD, 50 mg at 12/28/22 0154    hydrocortisone Sod Succ (PF) (SOLU-CORTEF) injection 50 mg, 50 mg, IntraVENous, Q12H, Colleen Astorga NP, 50 mg at 12/29/22 0803    insulin regular (NOVOLIN R, HUMULIN R) 100 Units in 0.9% sodium chloride 100 mL infusion, 0-50 Units/hr, IntraVENous, TITRATE, Na MOLINA MD, Last Rate: 1.5 mL/hr at 12/29/22 0917, 1.5 Units/hr at 12/29/22 0917    heparin (porcine) injection 5,000 Units, 5,000 Units, SubCUTAneous, Q12H, Amanda Randall DO, 5,000 Units at 12/29/22 0804    QUEtiapine (SEROquel) tablet 50 mg, 50 mg, Oral, QHS, David Aguilar DO, 50 mg at 12/27/22 2137    lidocaine 4 % patch 1 Patch, 1 Patch, TransDERmal, Q24H, Na MOLINA MD, 1 Patch at 12/28/22 1706    bumetanide (BUMEX) 0.25 mg/mL infusion, 0-2 mg/hr, IntraVENous, TITRATE, Na MOLINA MD, Last Rate: 4 mL/hr at 12/29/22 0812, 1 mg/hr at 12/29/22 0812    albuterol-ipratropium (DUO-NEB) 2.5 MG-0.5 MG/3 ML, 3 mL, Nebulization, Q6H RT, Na MOLINA MD, 3 mL at 12/29/22 3819 DOBUTamine (DOBUTREX) 500 mg/250 mL (2,000 mcg/mL) infusion, 5 mcg/kg/min, IntraVENous, CONTINUOUS, Sherie MOLINA MD, Last Rate: 8.5 mL/hr at 12/28/22 1427, 5 mcg/kg/min at 12/28/22 1427    balsam peru-castor oiL (VENELEX) ointment, , Topical, BID, Clarissa Miller MD, Given at 12/29/22 0804    NOREPINephrine (LEVOPHED) 8 mg in 5% dextrose 250mL (32 mcg/mL) infusion, 0.5-30 mcg/min, IntraVENous, TITRATE, Sherie MOLINA MD, Stopped at 12/24/22 2342    alteplase (CATHFLO) 1 mg in sterile water (preservative free) 1 mL injection, 1 mg, InterCATHeter, PRN, Sherie MOLINA MD    bacitracin 500 unit/gram packet 1 Packet, 1 Packet, Topical, PRN, Sherie MOLINA MD    glucose chewable tablet 16 g, 4 Tablet, Oral, PRN, Sherie MOLINA MD    glucagon (GLUCAGEN) injection 1 mg, 1 mg, IntraMUSCular, PRN, Riaz Ken MD    dextrose 10 % infusion 0-250 mL, 0-250 mL, IntraVENous, PRN, Riaz Ken MD    insulin lispro (HUMALOG) injection, , SubCUTAneous, AC&HS, Ramana Mauritian, DO, 10 Units at 12/28/22 2136    insulin lispro (HUMALOG) injection 3 Units, 0.05 Units/kg, SubCUTAneous, TID WITH MEALS, Sherie MOLINA MD, 3 Units at 12/29/22 0820    senna-docusate (Gala Rushing) 8.6-50 mg per tablet 1 Tablet, 1 Tablet, Oral, BID PRN, Sherie MOLINA MD, 1 Tablet at 12/26/22 4720    [Held by provider] rosuvastatin (CRESTOR) tablet 10 mg, 10 mg, Oral, QHS, Walker Aponte MD, 10 mg at 12/26/22 2209    bisacodyL (DULCOLAX) suppository 10 mg, 10 mg, Rectal, QHS PRN, Sherie MOLINA MD    sodium chloride (NS) flush 5-40 mL, 5-40 mL, IntraVENous, Q8H, Walker Aponte MD, 10 mL at 12/29/22 0500    sodium chloride (NS) flush 5-40 mL, 5-40 mL, IntraVENous, PRN, Riaz Ken MD, 10 mL at 12/28/22 0845    acetaminophen (TYLENOL) tablet 650 mg, 650 mg, Oral, Q6H PRN, 650 mg at 12/26/22 1714 **OR** acetaminophen (TYLENOL) suppository 650 mg, 650 mg, Rectal, Q6H PRN, Nikunj Guo MD    polyethylene glycol (MIRALAX) packet 17 g, 17 g, Oral, DAILY PRN, Nikunj Guo MD, 17 g at 12/26/22 0649    ondansetron (ZOFRAN ODT) tablet 4 mg, 4 mg, Oral, Q8H PRN **OR** ondansetron (ZOFRAN) injection 4 mg, 4 mg, IntraVENous, Q6H PRN, Luz Maria MOLINA MD, 4 mg at 12/24/22 0849    aspirin delayed-release tablet 81 mg, 81 mg, Oral, DAILY, Yuniel Wright MD, 81 mg at 12/29/22 6974    clopidogreL (PLAVIX) tablet 75 mg, 75 mg, Oral, DAILY, Yuniel Wright MD, 75 mg at 12/29/22 7397    [Held by provider] empagliflozin (JARDIANCE) tablet 10 mg, 10 mg, Oral, DAILY, Yuniel Wright MD    ergocalciferol capsule 50,000 Units, 50,000 Units, Oral, Q7D, Yuniel Wright MD, 50,000 Units at 12/23/22 0923    ipratropium (ATROVENT) 21 mcg (0.03 %) nasal spray 2 Spray, 2 Spray, Both Nostrils, Q12H, Eduar Pearson MD, 2 [place] at 12/29/22 0919    [Held by provider] ivabradine (CORLANOR) tablet 2.5 mg, 2.5 mg, Oral, BID WITH MEALS, Eduar Pearson MD, 2.5 mg at 12/23/22 0850    tamsulosin (FLOMAX) capsule 0.4 mg, 0.4 mg, Oral, DAILY, Ranjit Pearson MD, 0.4 mg at 12/29/22 0804    sodium chloride (NS) flush 5-40 mL, 5-40 mL, IntraVENous, PRN, MD Haydee Anaya MD  [facility] [address] [address]  [facility]  [phone]      Tammy Cruz MD

## 2022-12-29 NOTE — PROGRESS NOTES
RENAL  PROGRESS NOTE        Subjective:   Feels slightly better;off O2  Objective:   VITALS SIGNS:    Visit Vitals  /64   Pulse 99   Temp 98.2 °F (36.8 °C)   Resp 19   Ht 5' 9\" (1.753 m)   Wt 51.4 kg (113 lb 5.1 oz)   SpO2 96%   BMI 16.73 kg/m²       O2 Device: None (Room air)   O2 Flow Rate (L/min): 2 l/min   Temp (24hrs), Av.8 °F (37.1 °C), Min:97.9 °F (36.6 °C), Max:99.5 °F (37.5 °C)         PHYSICAL EXAM:  No edema  NAD  DATA REVIEW:     INTAKE / OUTPUT:   Last shift:       07 - 1900  In: 38.6 [I.V.:38.6]  Out: 250 [Urine:250]  Last 3 shifts: 1901 -  0700  In: 1305.3 [P.O.:715; I.V.:590.3]  Out: 4430 [Urine:4430]    Intake/Output Summary (Last 24 hours) at 2022 1034  Last data filed at 2022 1000  Gross per 24 hour   Intake 946.33 ml   Output 2595 ml   Net -1648.67 ml           LABS:   Recent Labs     22  0411 22  0354 22  0407   WBC 10.3 9.3 6.2   HGB 9.6* 9.7* 8.3*   HCT 32.1* 32.6* 27.6*    309 259       Recent Labs     22  0411 22  1558 22  0354 22  1436 22  0407   * 131* 133*   < > 136   K 3.4* 4.0 3.3*   < > 3.3*   CL 93* 91* 92*   < > 97   CO2    < > 30   * 237* 196*   < > 202*   BUN 87* 74* 75*   < > 71*   CREA 2.65* 2.77* 2.76*   < > 2.82*   CA 10.9* 10.5* 10.5*   < > 10.1   MG 2.9*  --  2.7*  --  2.7*   PHOS 5.4*  --  6.5*  --  5.5*   ALB 4.6 4.7 4.8  --  4.9   TBILI 0.6 0.8 1.0  --  1.1*   * 466* 568*  --  701*    < > = values in this interval not displayed.              Assessment:   CKD-3b  TANNER,in the setting of 1-urinary retention 2-poor hemodynamics 3-low EF at risk for CRS            Non oliguric,creat slightly better  Urinary retention/hydro s/p donnelly  Hyponatremia better  hypokalemia  Cardiomyopathy/low EF  Anemia    Hypercalcemia started on high dose po vit D on        decrease Bumex gtt   0.5 mg/hr  Ongoing cardiac work up   Creat slightly better  Stop vit D  SPEP pending,check FLC ,PTH for now  critical  Discussed with him ,RN  Jackey Cooks, MD

## 2022-12-29 NOTE — PROGRESS NOTES
Problem: Self Care Deficits Care Plan (Adult)  Goal: *Acute Goals and Plan of Care (Insert Text)  Description: FUNCTIONAL STATUS PRIOR TO ADMISSION: Patient was independent and active without use of DME. 2nd readmission in past month. Was completing ADLs and IADls without difficulty, went home on RA. Reports having difficulty doing stairs 2/2 increased SOB resulting in readmission. HOME SUPPORT: The patient lived with spouse but did not require assist.    Occupational Therapy Goals  Initiated 12/24/2022  1. Patient will perform upper body dressing with supervision/set-up within 7 day(s). 2.  Patient will perform lower body dressing with supervision/set-up within 7 day(s). 3.  Patient will perform bathing with supervision/set-up within 7 day(s). 4.  Patient will perform toilet transfers with supervision/set-up within 7 day(s). 5.  Patient will perform all aspects of toileting with supervision/set-up within 7 day(s). 6.  Patient will participate in upper extremity therapeutic exercise/activities with supervision/set-up for 5 minutes within 7 day(s). 7.  Patient will utilize energy conservation techniques during functional activities with verbal cues within 7 day(s). Outcome: Progressing Towards Goal   OCCUPATIONAL THERAPY TREATMENT/WEEKLY REASSESSMENT  Patient: Mary Alfonso (76 y.o. male)  Date: 12/29/2022  Diagnosis: [facility] [A41.9] <principal problem not specified>      Precautions: Fall  Chart, occupational therapy assessment, plan of care, and goals were reviewed. ASSESSMENT  Patient continues with skilled OT services and is progressing towards goals. Patient remains limited by [facility], able to tolerate brief standing on this date with assist x2. Preparatory bed level BUE/BLE exercises completed in prep for activity, noted drop in BP with sitting. Mild improvement in BP noted with ankle pumps. Upon attempting standing patient with heavy posterior lean, requiring assist x2 to steady.  Patient with significant drop in BP, did not endorse symptoms however patient with decreased responsiveness. Promptly returned to supine with assist x2, BP rebounding with positional changes. RN at bedside at conclusion of session. Continue to recommend SNF. Current Level of Function Impacting Discharge (ADLs): up to max A    Other factors to consider for discharge: SNF         PLAN :  Patient continues to benefit from skilled intervention to address the above impairments. Continue treatment per established plan of care to address goals. Recommend with staff: chair position for meals      Recommendation for discharge: (in order for the patient to meet his/her long term goals)  Therapy up to 5 days/week in SNF setting    This discharge recommendation:  Has been made in collaboration with the attending provider and/or case management    IF patient discharges home will need the following DME: defer to SNF       SUBJECTIVE:   Patient stated I feel good today.     OBJECTIVE DATA SUMMARY:   Cognitive/Behavioral Status:  Neurologic State: Alert  Orientation Level: Oriented X4  Cognition: Appropriate decision making; Appropriate for age attention/concentration; Appropriate safety awareness; Follows commands             Vitals:      12/29/22 1453 12/29/22 1455 12/29/22 1457   Vital Signs   BP (!) 106/57 (!) 83/44 (!) 94/51   MAP (Calculated) 73 (!) 57 65   BP 1 Location Left upper arm Left upper arm Left upper arm   BP 1 Method Automatic Automatic Automatic   BP Patient Position Supine;Semi fowlers Sitting Sitting  (following ankle pumps)      12/29/22 1459 12/29/22 1501   Vital Signs   BP (!) 78/44 114/62   MAP (Calculated) (!) 55 79   BP 1 Location Left upper arm Left upper arm   BP 1 Method Automatic Automatic   BP Patient Position Standing Supine;Semi fowlers     Functional Mobility and Transfers for ADLs:  Bed Mobility:  Rolling: Minimum assistance  Supine to Sit: Moderate assistance  Sit to Supine: Minimum assistance;Assist x2  Scooting: Maximum assistance;Assist x2    Transfers:  Sit to Stand: Moderate assistance;Assist x2          Balance:  Sitting: Impaired  Sitting - Static: Fair (occasional)  Sitting - Dynamic: Fair (occasional)  Standing: Impaired  Standing - Static: Fair;Constant support  Standing - Dynamic : Fair;Constant support    ADL Intervention:        Face washing: GUILLORY/SPV    Pain:  Pt did not endorse pain during session     Activity Tolerance:   Fair and signs and symptoms of orthostatic hypotension    After treatment patient left in no apparent distress:   Supine in bed, Call bell within reach, and Side rails x 3    COMMUNICATION/COLLABORATION:   The patients plan of care was discussed with: Physical therapist, Occupational therapist, and Registered nurse.      Lucie Barrios OT  Time Calculation: 15 mins

## 2022-12-29 NOTE — ROUTINE PROCESS
07:30 SBAR from [facility] states he feels a little better and stronger today. 09:00 Did not sleep much better last night,due to insulin gtt and frequent finger sticks, yet still feeling better today. Appetite still poor but he is trying. 10:05 Family in for visit, updated by HF team.    11:45 To [facility] for scan, family in waiting room. 15:00 Worked with OT, sat on edge of bed with help. Tolerated well. When he stood, he required much help and became orthostatic, said  \" I am a little dizzy\", returned to bed and feeling well afterward. 18:00 Insulin gtt stopped     19:30 Bedside shift change report given to Raoul Diggs (oncoming nurse) by MOM (offgoing nurse). Report included the following information SBAR, Kardex, Procedure Summary, Intake/Output, MAR, Accordion, Recent Results, Med Rec Status, Cardiac Rhythm NSR, Alarm Parameters , Pre Procedure Checklist, Procedure Verification, and Quality Measures.

## 2022-12-29 NOTE — PROGRESS NOTES
1930: Bedside and Verbal shift change report given to Dhara Olivas RN (oncoming nurse) by LORI OSEI (offgoing nurse). Report included the following information SBAR, Kardex, ED Summary, OR Summary, Procedure Summary, Intake/Output, MAR, Recent Results, Cardiac Rhythm ST, and Alarm Parameters . 1945: Gtts verified. 2130: Blood sugar 450. MD notified. Orders received for 10 units of Lispro and to recheck in an hour. 2230: Blood sugar 431. MD notified. Orders received to start insulin gtt. 2335: Insulin gtt started. 0730: Bedside and Verbal shift change report given to SEA RN (oncoming nurse) by Dhara Olivas RN (offgoing nurse). Report included the following information SBAR, Kardex, ED Summary, OR Summary, Procedure Summary, Intake/Output, MAR, Recent Results, Cardiac Rhythm SR/ST, and Alarm Parameters .

## 2022-12-29 NOTE — PROGRESS NOTES
[facility] in [place], [place]  Inpatient Progress Note      Patient name: Lindy Vincent  Patient : 1950  Patient MRN: [id number]  Consulting MD: Saemus Perez DO  Date of service: 22    REASON FOR REFERRAL:  Management of acute on chronic systolic heart failure    Plan of Care:  71 y.o. male with hx ICM, CAD s/p CABG, admitted for Acute on Chronic HFrEF. Likely causes of worsening HF are untreated TRISTAN, amyloidosis, progression of ICM or myocarditis. Initial HF evaluation including PYP when patient is able to lay flat. Will continue inotropic support for cardiogenic shock  Feel risk outweighs benefit for biopsy at this time. Trial steroids. Consider cardiac MRI next week. INTERVAL HISTORY:  -HR improving into 90s; BP steady  -Hg steady at 9.6; creat slight decrease at 2.65; ; K 3.4; LFTs improving; t bili down to 0.6  -weight down 0.5#; I/O neg 1.5L  -Mr. Kendall is much more alert today. He denies pain, SOB, swelling. Still fatigued. Poor appetite. Some tenderness in his upper epigastric region to palpation.   Multiple family members at bedside       RECOMMENDATIONS:  Continue current medical therapy for heart failure  Hold GDMT due to cardiogenic shock  Continue Dobutamine gtt at 5mcg/kg/min- watch for increased ectopy  Continue Bumex gtt, dosing per nephrology, plan for neg 1-2L  uric acid level 6.8  No indication for systemic anticoagulation  ASA restarted  No statin due to transaminitis- improving   Keep K > 4 and Mag > 2  Transfuse to keep Hgb > 7  Pulmonary hygiene, wean O2 as able   Labs: CBC, BMP, LFT, pro-NT-BNP  Venofer x 2  Will need OP Sleep Study  Send genetic testing this week   Appreciate nephrology and cardiology recommendations   Consider cMRI when able   PYP pending   Would hold off on biopsy at this time, as risk likely outweighs beneift  Start trial of steroids to see if it improves clinical picture; lower infection risk discussed with intensivisit. 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  levels steady high   Volume overload improving   CAD s/p CABG x 2: further disease best managed medically due to small vessel size   Mild AS  PAD  Bilateral hydronephrosis s/p donnelly  DM2  Fatigue, severe. LIFE GOALS:  Lifestyle goals reviewed with the patient. Patient's personal goals include: TBD  Important upcoming milestones or family events: TBD  The patient identifies the following as important for living well: TBD      HPI:  71 y.o. male who was admitted via ED for worsening SOB, poor appetite, orthopnea and fatigue. Pmhx of CAD s/p CABG, PAD, DM 2, recent admission for HFmrEF earlier this month. Serial TTEs show progressive decline in EF since 3/2021 with the most recent EF at 25-30%. Recent LHC shows diffuse CAD and no interventions indicated. Patient had Rashmi Medal recently and there is some thought to myocarditis or other etiology causing worsening HF. [facility] was consulted for further evaluation and management of HFrEF. CARDIAC IMAGING:  Echo 12/26/22    Left Ventricle: Severely reduced left ventricular systolic function with a visually estimated EF of 25 - 30%. Left ventricle size is normal. Normal wall thickness. There are regional wall motion abnormalities. Grade II diastolic dysfunction with increased LAP. Right Ventricle:  Moderately reduced systolic function. TAPSE is abnormal. TAPSE is 1.1 cm. Aortic Valve: Mild stenosis of the aortic valve. AV peak gradient is 13 mmHg. AV peak velocity is 1.8 m/s. Mitral Valve: Not well visualized. Moderate annular calcification at the posterior leaflet of the mitral valve. Mild to moderate regurgitation. Tricuspid Valve: Mildly elevated RVSP. Left Atrium: Left atrium is moderately dilated. 12/8/22    Left Ventricle: Moderately reduced left ventricular systolic function with a visually estimated EF of 35 - 40%. Severe hypokinesis of the following segments: mid anteroseptal, apical anterior, apical septal, apical inferior and apical lateral. Severe hypokinesis of the apex. Mitral Valve: Severely thickened leaflet, at the anterior and posterior leaflets. Severely calcified leaflet, at the anterior and posterior leaflets. Mild annular calcification of the mitral valve. Moderate regurgitation. Left Atrium: Left atrium is mildly dilated. Contrast used: Definity. limited study    EKG 12/22/22 ST, Biatria enlargement, marked ST abnormality    C 12/6/22  1. Normal LVEDP  2. Severe native multivessel coronary artery disease  3. Patent LIMA to LAD and vein graft to distal RCA  4. Recurrent ISR in OM1 stent with now 60 to 70% restenosis  5. Recoil of left main and circumflex stent with now recurrent 40 to 50% stenosis. 6.  Progression of ostial left main disease now to about 60% stenosis  7. Progression of disease in jailed first marginal branch now with diffuse 90% stenosis  8.   High-grade stenosis in the mid to distal right potential femoral artery treated with 6 x 40 mm impact drug-coated balloon angioplasty to reduce the stenosis to less than 40%    NST      HEMODYNAMICS:  RHC not done  CPEST too ill   6MW too ill    OTHER IMAGING:  CXR   XR Results (most recent):  Results from Hospital Encounter encounter on 12/22/22    XR CHEST PORT    Narrative  EXAM: XR CHEST PORT    DATE: 12/24/2022 6:04 PM    INDICATION: CVC placement    COMPARISON: Chest radiograph December 24, 2022 at 1412 hours    FINDINGS: AP portable chest radiograph. There is a new RIGHT IJ central venous  catheter with the tip near the cavoatrial junction. Patient is status post  sternotomy and CABG. The heart is mildly enlarged but stable. The lungs are  hyperinflated. There are persistent, small bilateral pleural effusions. There is  no pneumothorax. A skin fold projects over the RIGHT upper lung. The vascular  clarity is again mildly diminished. Impression  1. RIGHT IJ catheter is in satisfactory position with the tip at the cavoatrial  junction. No pneumothorax. 2. No change in small bilateral effusions. CT Results (most recent):  Results from Hospital Encounter encounter on 12/22/22    CT ABD PELV WO CONT    Narrative  EXAM: CT ABD PELV WO CONT    INDICATION: rlq abdominal pain    COMPARISON: 5/3/2014    IV CONTRAST: None. ORAL CONTRAST: None    TECHNIQUE:  Thin axial images were obtained through the abdomen and pelvis. Coronal and  sagittal reformats were generated. CT dose reduction was achieved through use of  a standardized protocol tailored for this examination and automatic exposure  control for dose modulation. The absence of intravenous contrast material reduces the sensitivity for  evaluation of the vasculature and solid organs. FINDINGS:  LOWER THORAX: Small bilateral pleural effusions. Partial collapse bases  LIVER: No mass. BILIARY TREE: Gallstones. No evidence of acute cholecystitis CBD is not dilated. SPLEEN: within normal limits. PANCREAS: No focal abnormality. ADRENALS: 19 mm right adrenal nodule unchanged,  KIDNEYS/URETERS: Mild bilateral hydronephrosis. No stone  STOMACH: Unremarkable. SMALL BOWEL: No dilatation or wall thickening. COLON: No dilatation or wall thickening. APPENDIX: Surgically absent  PERITONEUM: No ascites or pneumoperitoneum.   RETROPERITONEUM: No lymphadenopathy or aortic aneurysm. Extensive vascular  calcifications  REPRODUCTIVE ORGANS: Mildly enlarged  URINARY BLADDER: Massive distention  BONES: No destructive bone lesion. ABDOMINAL WALL: Small umbilical hernia containing fat. ADDITIONAL COMMENTS: N/A    Impression  1. Bladder is massively distended with mild bilateral hydronephrosis. May  indicate bladder outlet obstruction. Prostate is mildly enlarged  2. Gallstones. No acute cholecystitis  3.  Small bilateral pleural effusions      PHYSICAL EXAM:  Visit Vitals  BP (!) 110/57   Pulse 100   Temp 98.6 °F (37 °C)   Resp 17   Ht 5' 9\" (1.753 m)   Wt 113 lb 5.1 oz (51.4 kg)   SpO2 96%   BMI 16.73 kg/m²     Physical Assessment:   General Appearance: alert, ill-appearing elderly male resting in bed in NAD; appears older than documented age; [place]  Eyes: sclera anicteric  Mouth/Throat: dry mucous membranes; oral pharynx clear  Neck: supple;  Pulmonary:  dim to auscultation bilaterally; poor effort;   Cardiovascular: tachy rate and rhythm; no murmur, click, rub, or gallop  Abdomen: soft, non-tender, non-distended; bowel sounds normal  Musculoskeletal: no swelling or deformity; moves all extremities  Extremities: no edema; palpable distal pulses   Skin: warm and dry  Neuro: grossly normal   Psych: appropriate to situation        REVIEW OF SYSTEMS:  Review of Symptoms:  Constitutional: fatigue, weak  Eyes: negative  Ears, nose, mouth, throat, and face: [place]  Respiratory: negative  Cardiovascular: negative  Gastrointestinal: some tenderness in upper epigastric region to palpation; poor appetite    Genitourinary:negative  Musculoskeletal:negative  Neurological: negative   Behvioral/Psych: negative  Endocrine: negative        PAST MEDICAL HISTORY:  Past Medical History:   Diagnosis Date    CAD (coronary artery disease) 11/10/2016    NSTEMI & 2 stents    Deafness 10/28/2012    DM (diabetes mellitus) (HCC)     Elevated cholesterol     Hypertension     NSTEMI (non-ST elevated myocardial infarction) ([address] 11/10/2016       PAST SURGICAL HISTORY:  Past Surgical History:   Procedure Laterality Date    COLONOSCOPY N/A 6/28/2018    COLONOSCOPY performed by Mortimer Smiling, MD at [facility] ENDOSCOPY    [address]  11/11/2016    2 stents       FAMILY HISTORY:  Family History   Problem Relation Age of Onset    Heart Disease Father     Heart Attack Father     Hypertension Mother     Elevated Lipids Brother     Elevated Lipids Brother     No Known Problems Sister     Elevated Lipids Brother     No Known Problems Son     No Known Problems Daughter     Anesth Problems Neg Hx        SOCIAL HISTORY:  Social History     Socioeconomic History    Marital status:    Tobacco Use    Smoking status: Never     Passive exposure: Never    Smokeless tobacco: Never   Vaping Use    Vaping Use: Never used   Substance and Sexual Activity    Alcohol use: Yes     Alcohol/week: 2.0 standard drinks     Types: 1 Cans of beer, 1 Shots of liquor per week     Comment: rarely    Drug use: No    Sexual activity: Yes     Social Determinants of Health     Financial Resource Strain: Medium Risk    Difficulty of Paying Living Expenses: Somewhat hard   Food Insecurity: Food Insecurity Present    Worried About Running Out of Food in the Last Year: Never true    Ran Out of Food in the Last Year: Often true       LABORATORY RESULTS:     Labs Latest Ref Rng & Units 12/29/2022 12/28/2022 12/28/2022 12/27/2022 12/27/2022 12/26/2022 12/25/2022   WBC 4.1 - 11.1 K/uL 10.3 - 9.3 - 6.2 6.3 -   RBC 4.10 - 5.70 M/uL 4.31 - 4.16 - 3.68(L) 3.14(L) -   Hemoglobin 12.1 - 17.0 g/dL 9.6(L) - 9. 7(L) - 8. 3(L) 7. 2(L) 7. 1(L)   Hematocrit 36.6 - 50.3 % 32. 1(L) - 32. 6(L) - 27. 6(L) 24. 8(L) -   MCV 80.0 - 99.0 FL 74. 5(L) - 78. 4(L) - 75. 0(L) 79. 0(L) -   Platelets 774 - 398 K/uL 337 - 309 - 259 239 -   Lymphocytes 12 - 49 % 4(L) - 11(L) - 12 10(L) -   Monocytes 5 - 13 % 5 - 10 - 8 7 -   Eosinophils 0 - 7 % 0 - 4 - 4 2 -   Basophils 0 - 1 % 1 - 1 - 1 1 -   Albumin 3.5 - 5.0 g/dL 4.6 4.7 4.8 - 4.9 4.6 -   Calcium 8.5 - 10.1 MG/DL 10. 9(H) 10. 5(H) 10. 5(H) 10. 3(H) 10.1 9.2 -   Glucose 65 - 100 mg/dL 126(H) 237(H) 196(H) 322(H) 202(H) 106(H) -   BUN 6 - 20 MG/DL 87(H) 74(H) 75(H) 74(H) 71(H) 68(H) -   Creatinine 0.70 - 1.30 MG/DL 2.65(H) 2.77(H) 2.76(H) 2.94(H) 2.82(H) 2.54(H) -   Sodium 136 - 145 mmol/L 135(L) 131(L) 133(L) 133(L) 136 136 -   Potassium 3.5 - 5.1 mmol/L 3.4(L) 4.0 3.3(L) 4.1 3.3(L) 3.0(L) -   TSH 0.36 - 3.74 uIU/mL - - - 2.12 - - -   PSA 0.01 - 4.0 ng/mL - - - 3.9 - - -   Some recent data might be hidden     Lab Results   Component Value Date/Time    TSH 2.12 12/27/2022 02:36 PM    TSH 4.80 (H) 12/06/2022 03:53 AM    TSH 5.39 (H) 10/12/2022 09:10 AM    TSH 3.53 02/03/2022 11:47 AM    TSH 5.790 (H) 11/21/2019 04:45 PM    TSH 3.08 06/22/2018 01:53 PM    TSH 4.250 05/26/2015 09:43 AM       ALLERGY:  No Known Allergies     CURRENT MEDICATIONS:    Current Facility-Administered Medications:     potassium chloride SR (KLOR-CON 10) tablet 40 mEq, 40 mEq, Oral, BID, Walker Cui MD, 40 mEq at 12/29/22 0612    insulin glargine (LANTUS) injection 40 Units, 40 Units, SubCUTAneous, DAILY, Abebe Tejeda DO, 40 Units at 12/29/22 0827    pantoprazole (PROTONIX) tablet 40 mg, 40 mg, Oral, ACB, David Aguilar DO    diphenhydrAMINE (BENADRYL) capsule 50 mg, 50 mg, Oral, QHS, Ana Newton MD, 50 mg at 12/28/22 0154    hydrocortisone Sod Succ (PF) (SOLU-CORTEF) injection 50 mg, 50 mg, IntraVENous, Q12H, Colleen Astorga NP, 50 mg at 12/29/22 0803    insulin regular (NOVOLIN R, HUMULIN R) 100 Units in 0.9% sodium chloride 100 mL infusion, 0-50 Units/hr, IntraVENous, TITRATE, Walker Aponte MD, Last Rate: 6.1 mL/hr at 12/29/22 1137, 6.1 Units/hr at 12/29/22 1137    heparin (porcine) injection 5,000 Units, 5,000 Units, SubCUTAneous, Q12H, Abebe Tejeda DO, 5,000 Units at 12/29/22 0804    QUEtiapine (SEROquel) tablet 50 mg, 50 mg, Oral, QHS, Ramana Jerry, DO, 50 mg at 12/27/22 2137    lidocaine 4 % patch 1 Patch, 1 Patch, TransDERmal, Q24H, Sherie MOLINA MD, 1 Patch at 12/28/22 1706    bumetanide (BUMEX) 0.25 mg/mL infusion, 0.5 mg/hr, IntraVENous, TITRATE, Abou-Assi, Marcy Aly MD, Last Rate: 2 mL/hr at 12/29/22 1007, 0.5 mg/hr at 12/29/22 1007    albuterol-ipratropium (DUO-NEB) 2.5 MG-0.5 MG/3 ML, 3 mL, Nebulization, Q6H RT, Walker Corado MD, 3 mL at 12/29/22 0856    DOBUTamine (DOBUTREX) 500 mg/250 mL (2,000 mcg/mL) infusion, 5 mcg/kg/min, IntraVENous, CONTINUOUS, Sherie MOLINA MD, Last Rate: 8.5 mL/hr at 12/28/22 1427, 5 mcg/kg/min at 12/28/22 1427    balsam peru-castor oiL (VENELEX) ointment, , Topical, BID, Clarissa Miller MD, Given at 12/29/22 0804    NOREPINephrine (LEVOPHED) 8 mg in 5% dextrose 250mL (32 mcg/mL) infusion, 0.5-30 mcg/min, IntraVENous, TITRATE, Sherie MOLINA MD, Stopped at 12/24/22 2342    alteplase (CATHFLO) 1 mg in sterile water (preservative free) 1 mL injection, 1 mg, InterCATHeter, PRN, Sherie MOLINA MD    bacitracin 500 unit/gram packet 1 Packet, 1 Packet, Topical, PRN, Sherie MOLINA MD    glucose chewable tablet 16 g, 4 Tablet, Oral, PRN, Sherie MOLINA MD    glucagon (GLUCAGEN) injection 1 mg, 1 mg, IntraMUSCular, PRN, Walker Corado MD    dextrose 10 % infusion 0-250 mL, 0-250 mL, IntraVENous, PRNJose Anibal A, MD    insulin lispro (HUMALOG) injection, , SubCUTAneous, AC&HS, Ramana Santosese, DO, 10 Units at 12/28/22 2136    insulin lispro (HUMALOG) injection 3 Units, 0.05 Units/kg, SubCUTAneous, TID WITH MEALS, Sherie MOLINA MD, 3 Units at 12/29/22 0820    senna-docusate (PERICOLACE) 8.6-50 mg per tablet 1 Tablet, 1 Tablet, Oral, BID PRN, Riaz Ken MD, 1 Tablet at 12/26/22 0649    bisacodyL (DULCOLAX) suppository 10 mg, 10 mg, Rectal, QHS PRN, Retia Pipes, Walker MOLINA MD    sodium chloride (NS) flush 5-40 mL, 5-40 mL, IntraVENous, Q8H, Walker Aponte MD, 10 mL at 12/29/22 0500    sodium chloride (NS) flush 5-40 mL, 5-40 mL, IntraVENous, PRN, Agnes MOLINA MD, 10 mL at 12/28/22 0845    acetaminophen (TYLENOL) tablet 650 mg, 650 mg, Oral, Q6H PRN, 650 mg at 12/26/22 1714 **OR** acetaminophen (TYLENOL) suppository 650 mg, 650 mg, Rectal, Q6H PRN, Hominy Daily, Walker MOLINA MD    polyethylene glycol (MIRALAX) packet 17 g, 17 g, Oral, DAILY PRN, Aminta Ward MD, 17 g at 12/26/22 0649    ondansetron (ZOFRAN ODT) tablet 4 mg, 4 mg, Oral, Q8H PRN **OR** ondansetron (ZOFRAN) injection 4 mg, 4 mg, IntraVENous, Q6H PRN, Agnes MOLINA MD, 4 mg at 12/24/22 0849    aspirin delayed-release tablet 81 mg, 81 mg, Oral, DAILY, Satish Pandey MD, 81 mg at 12/29/22 2426    ipratropium (ATROVENT) 21 mcg (0.03 %) nasal spray 2 Spray, 2 Spray, Both Nostrils, Q12H, Ranjit Pearson MD, 2 [place] at 12/29/22 0919    tamsulosin (FLOMAX) capsule 0.4 mg, 0.4 mg, Oral, DAILY, Otilio Pearson MD, 0.4 mg at 12/29/22 0804    sodium chloride (NS) flush 5-40 mL, 5-40 mL, IntraVENous, PRN, Aminta Ward MD    PATIENT CARE TEAM:  Patient Care Team:  Elisa Greenwood MD as PCP - General (Family Medicine)  Elisa Greenwood MD as PCP - [facility]  Dena Cedeño MD (Cardiovascular Disease Physician)  Derrick Uribe MD (Gastroenterology)  Milka Alicia MD (Cardiothoracic Surgery)  Najma Rahman MD (Cardiovascular Disease Physician)  Anum Presley MD (Nephrology)  Rommie [place], RN as Care Transitions Nurse  Otilia Mina MD (Pulmonary Disease)     Thank you for allowing me to participate in this patient's care.     Bettie Connor NP   [address] [place] [address], [address]  Phone: [phone]    On this date 12/29/2022, I have spent greater than 50% of a 30 minute visit personally reviewing new vitals, test results, notes, telemetry/EKG, face to face encounter/physical exam of patient, writing orders, performing medical decision making, and documenting.

## 2022-12-29 NOTE — ROUTINE PROCESS
07:30 SBAR from [facility] states he feels a little better and stronger today. 09:00 Did not sleep much better last night,due to insulin gtt and frequent finger sticks, yet still feeling better today. Appetite still poor but he is trying. 10:05 Family in for visit, updated by HF team.    11:45 To [facility] for scan, family in waiting room. 15:00 Worked with OT, sat on edge of bed with help. Tolerated well. When he stood, he required much help and became orthostatic, said  \" I am a little dizzy\", returned to bed and feeling well afterward. 18:00 Insulin gtt stopped     19:30 Bedside shift change report given to Lukas Alvarenga (oncoming nurse) by MOM (offgoing nurse). Report included the following information SBAR, Kardex, Procedure Summary, Intake/Output, MAR, Accordion, Recent Results, Med Rec Status, Cardiac Rhythm NSR, Alarm Parameters , Pre Procedure Checklist, Procedure Verification, and Quality Measures.

## 2022-12-29 NOTE — PROGRESS NOTES
Comprehensive Nutrition Assessment    Type and Reason for Visit: Initial, Consult, Wound    Nutrition Recommendations/Plan:     -Document meal and supplement intake       -Encourage patient to snack between meals on supplements    -Modify ONS: Ensure Plus HP bid, Magic cup once daily         Malnutrition Assessment:  Malnutrition Status:  Severe malnutrition (12/29/22 1147)    Context:  Acute illness     Findings of the 6 clinical characteristics of malnutrition:   Energy Intake:  Unable to assess  Weight Loss:  Greater than 5% over 1 month     Body Fat Loss: Moderate body fat loss, Buccal region, Orbital   Muscle Mass Loss: Moderate muscle mass loss, Clavicles (pectoralis & deltoids), Thigh (quadriceps), Calf  Fluid Accumulation:  No significant fluid accumulation,     Strength:  Not performed        Nutrition Assessment:    Pt admitted with Sepsis. PMHx: CAD, DM 2, HTN, NSTEMI. Cardiogenic shock resolved-off pressor. Cardiomyopathy systolic heart failure; ?myocarditis. Steroids ordered; biopsy deferred for now. EF ~20%. TANNER on CKD 3. Severe fatigue. Per wife, pt weighed 135# when admitted on 12/5. Current wt reflects 22# weight loss-suspect mostly fluid however wife reports pt without edema when weighed 135#. Pt appears malnourished; meets criteria for acute malnutrition (see above). Poor PO intake noted by wife since first hospital admission but worse in the past week. Family bringing in food from home; Mr Gelacio Lindsay did accept strawberry Ensure well yesterday. Pt complains of early satiety-will take a few bites and then states he doesn't want anymore. Encouraged wife to have patient eat throughout the day vs just at mealtime; consume Ensure supplements between meals. Mr Gelacio Lindsay is accepting bananas and milk well per wife; wife also bringing in extra spices for hospital food as well as [employer] ([facility]) food that pt normally consumes.     Will modify Glucerna shake to Ensure Plus HP since pt eating little and prefers strawberry flavor. Ensure also provides more kcal/protein per serving (350 kcal/20 gm protein vs 220 kcal/10 gm protein). Per documentation below-pt is at max consuming ~35% of meals. Will continue to monitor intake and weight. May benefit from short term EN support. Potassium replaced today. Insulin drip ordered 2/2 addition of steroid. BG close to 500 yesterday evening-better today. BUN, CR, Phosphorus and Magnesium all elevated d/t TANNER. Vit D discontinued today 2/2 hypercalcemia-nephrology following. Nutritionally Significant Medications:  Insulin drip, Bumex drip, Dobutamine, hydrocortisone, Lantus, Humalog, Protonix, KCL      Estimated Daily Nutrient Needs:  Energy Requirements Based On: Kcal/kg  Weight Used for Energy Requirements: Current (51.7 kg)  Energy (kcal/day): 7305-1023 (30-35 kcal/kg)  Weight Used for Protein Requirements: Current  Protein (g/day): 62-72 (1.2-1.4g/kg)  Method Used for Fluid Requirements: 1 ml/kcal  Fluid (ml/day):      Nutrition Related Findings:   Edema: Trace generalized  Last BM: 12/27/22, Soft    Wounds: Deep tissue injury      Current Nutrition Therapies:  Diet: Regular 4 carb choices (60 gm/meal) Low Fiber 1800 ml FR  Supplements: Glucerna shake bid  Meal intake: Patient Vitals for the past 168 hrs:   % Diet Eaten   12/28/22 1815 1 - 25%   12/28/22 1400 0%   12/28/22 0945 1 - 25%   12/27/22 1758 51 - 75%   12/27/22 1351 51 - 75%   12/27/22 0800 1 - 25%   12/23/22 0900 1 - 25%     Supplement intake: No data found. Nutrition Support: None      Anthropometric Measures:  Height: 5' 9\" (175.3 cm)  Ideal Body Weight (IBW): 160 lbs (73 kg)     Current Body Wt:  51.7 kg (113 lb 15.7 oz), 71.2 % IBW.  Bed scale  Current BMI (kg/m2): 16.8                          BMI Category: Underweight (BMI less than 22) age over 72    Wt Readings from Last 10 Encounters:   12/29/22 51.4 kg (113 lb 5.1 oz)   12/19/22 58.5 kg (129 lb)   12/16/22 57.4 kg (126 lb 8.7 oz)   12/05/22 59 kg (130 lb)   12/05/22 59.1 kg (130 lb 3.2 oz)   11/16/22 61.2 kg (135 lb)   10/27/22 60.8 kg (134 lb)   10/27/22 60.8 kg (134 lb)   10/12/22 60.8 kg (134 lb)   09/13/22 60.3 kg (133 lb)           Nutrition Diagnosis:   Inadequate oral intake related to cardiac dysfunction as evidenced by intake 26-50%. Nutrition Interventions:   Food and/or Nutrient Delivery: Continue current diet, Modify oral nutrition supplement  Nutrition Education/Counseling: No recommendations at this time  Coordination of Nutrition Care: Continue to monitor while inpatient, Interdisciplinary rounds       Goals:     Goals: PO intake 75% or greater, within 7 days       Nutrition Monitoring and Evaluation:   Behavioral-Environmental Outcomes: None identified  Food/Nutrient Intake Outcomes: Supplement intake, Food and nutrient intake  Physical Signs/Symptoms Outcomes: Biochemical data, Fluid status or edema, Weight, Hemodynamic status, GI status    Discharge Planning:     Too soon to determine    Nancy Pinon RD CNSC  Available via [facility]

## 2022-12-29 NOTE — PROGRESS NOTES
[facility]  Cardiology Care Note                  []Initial visit     [x]Established visit     Patient Name: Jeanette Fenton - XVQ:[id number]  Primary Cardiologist: Clive Carter MD  Consulting Cardiologist: Jaylene Zarate MD     Reason for initial visit:  dyspnea, decompensated HF, tachycardia. HPI:     CAD with CABG 6/9/2018. NSTEMI in November 2016 and resolved pulmonary HTN. Stent to circumflex an LAD in 2016. Stress echo in 2018 with a drop in BP with exercise and a drop in EF. Cath on 6/22/18 that showed even with a 5F catheter, he dampened with suspected ostial 3 vessel disease. Echo 3/27/2021 = EF 55 to 60% mild AR MR TR LAE MAC  Nuclear 3/18/2021 EF 64% medium size defect apical and inferior lateral reversible ischemia medium defect mid and inferolateral nonreversible appear to be infarction intermediate risk stress test .  PCI 02/28/2022--PCI of native left main, proximal circumflex and OM1    Over the past 2 months has been reporting progressive dyspnea, atypical chest discomfort and significant tachycardia with progressive deterioration in LV function. Previously thought to be secondary to ischemic cardiomyopathy but overall picture is more consistent with likely myocarditis/alternative cause of heart failure. Now presenting with obstructive uropathy causing bilateral hydronephrosis and TANNER and subsequently having transient hypotension from sepsis complicating an otherwise picture of cardiogenic shock with multiorgan dysfunction. SUBJECTIVE:    Condition be short of breath and orthopnea though better. Poor appetite. Extremely tired and fatigued        Assessment and Plan       Cardiogenic shock.     2  CAD native vessel with patent PULLIAM to LAD, patent vein graft to RCA    3  Cardiomyopathy systolic heart failure: Rapid deterioration in LV function with overall WMA on recent echo appears not to be in teritorry of ischemia suggesting alternative mechanism fo cardiomyopathy(stress SMP vs myocarditis). Notably apex is significantly hypokinetic but LIMA to LAD is patent on recent cardiac catheterization hence cardiomyopathy is unlikely to be ischemia driven. Moreover with progressive clinical deterioration and features suggestive of cardiogenic shock, possibility of myocarditis is likely. Agree with institution of IV steroid trial.  We will consider cardiac MRI early next week to assess for degree of edema. Defer myocardial biopsy at this point of time    4. Aortic stenosis mild     5. Diabetes mellitus type 2 defer management to primary team.    6 .  Acute liver injury likely secondary to hypotension    7. 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 Baseline creatinine about 1.5 to 1.7 mg/dL with superimposed TANNER likely secondary from recent hypotension. Slight improvement in creatinine    8. PAD: S/P Right SFA PTCA. Needs follow up with Dr Khai Mayes in 2 -3 months with MARIANELA/Dopplers    9. Bladder obstruction contributing to bilateral hydronephrosis and TANNER    Neal Kendall appears to be critically ill in ICU. He is demonstrating signs of multiorgan dysfunction. His blood pressures appear to be now stabilized with dobutamine drip renal function is slightly better than there is improvement in liver function tests as well. Overall this indicates adequate tissue perfusion. His most recent echocardiogram does suggest evidence of biventricular dysfunction with normal RV function appearing worse compared to previously. Clinical course does suggest possibility of (sub)acute myocarditis. After multidisciplinary discussion between cardiology, advanced heart failure service, intensivist it is felt that it would be reasonable to try steroids with a provisional diagnosis of myocarditis as a salvage therapy. Continue dobutamine for now.   No beta-blockers, Corlanor, Entresto or other guideline directed medical therapy at this point of time given poor cardiac hemodynamics. I personally spent 35 minutes of critical care time. This is time spent at this critically ill patient's bedside / unit / floor actively involved in patient care as well as the coordination of care. This does not include any procedural time which has been billed separately. I had a face to face encounter with the patient, reviewed and interpreted patient data including clinical events, labs, images, vital signs, I/O's, and examined patient. I have discussed the case and the plan and management of the patient's care with the consulting services and care team.  This patient has a high probability of imminent and/or clinically significant deterioration. ____________________________________________________________    Cardiac testing  12/22/22    ECHO ADULT COMPLETE 12/26/2022 12/26/2022    Interpretation Summary    Left Ventricle: Severely reduced left ventricular systolic function with a visually estimated EF of 25 - 30%. Left ventricle size is normal. Normal wall thickness. There are regional wall motion abnormalities. Grade II diastolic dysfunction with increased LAP. Right Ventricle: Moderately reduced systolic function. TAPSE is abnormal. TAPSE is 1.1 cm. Aortic Valve: Mild stenosis of the aortic valve. AV peak gradient is 13 mmHg. AV peak velocity is 1.8 m/s. Mitral Valve: Not well visualized. Moderate annular calcification at the posterior leaflet of the mitral valve. Mild to moderate regurgitation. Tricuspid Valve: Mildly elevated RVSP. Left Atrium: Left atrium is moderately dilated. Signed by: Chele Bernstein MD on 12/26/2022  3:13 PM         10/27/22    NUCLEAR CARDIAC STRESS TEST 10/27/2022 11/1/2022    Interpretation Summary    Nuclear Findings: The study is positive for myocardial ischemia. The defect appears to be ischemia. The areas of ischemia are similar to 3/15/2021 study.     Nuclear Findings: There is a moderate severity left ventricular stress perfusion defect that is medium in size present in the mid to distal inferolateral and anterolateral segment(s) that is reversible. The defect is consistent with abnormal perfusion in the LCx territory. There is normal wall motion in the defect area. The defect appears to be probable ischemia. There is a moderate severity second left ventricular stress perfusion defect. The defect appears to be infarction. Nuclear Findings: Abnormal left ventricular systolic function post-stress. Septal dyskinesis. Post-stress ejection fraction is 39%. ECG: Resting ECG demonstrates normal sinus rhythm. ECG: Stress ECG was negative for ischemia. Stress Test: A pharmacological stress test was performed using lexiscan. Blood pressure demonstrated a normal response and heart rate demonstrated a normal response to stress. The patient's heart rate recovery was normal. The patient reported no symptoms during the stress test.    Signed by: Nina Pastrana MD on 10/27/2022  4:45 PM    02/28/22    CARDIAC PROCEDURE 02/28/2022 2/28/2022    Conclusion  Findings  1. Severe native multivessel coronary disease  2. Patent LIMA to LAD, vein graft to distal RCA with severe disease in the native vessels  3. Occluded vein graft to OM 2. Native OM 2 severely diseased along with proximal to mid circumflex as well as distal left main. Overall the vessel is significantly remodeled negatively. Additionally there is significant disease in the first marginal branch which is even smaller as well as AV groove branch right at the takeoff of OM2. Single stent 2.25 x 28 mm Xience was placed extending from the OM 2 into the circumflex into the distal left main and sequentially dilated with 2.25 noncompliant, 2 5 noncompliant, 3 oh noncompliant and 3 5 noncompliant to perform multilevel POT to manage multiple bifurcations on the way.   Atherectomy was considered but given small size of the vessels, was not deemed to be absolutely safe for the obtuse marginal lesion. Overall stent expanded fairly well related to the size of the vessels. 4.  Normal LVEDP    Access right radial: Small vessel, tortuous} brachiocephalic  Contrast 65 cc    Recommendations  1. Plavix based dual antiplatelet therapy  2. Guideline directed medical therapy for secondary prevention of coronary events    Signed by: Nikki Sanchez MD on 2/28/2022  4:22 PM        Most recent HS troponins:  No results for input(s): TROPHS in the last 72 hours. No lab exists for component:  CKMB      Review of Systems    [x]All other systems reviewed and all negative except as written in HPI    [] Patient unable to provide secondary to condition         Past Medical History:   Diagnosis Date    CAD (coronary artery disease) 11/10/2016    NSTEMI & 2 stents    Deafness 10/28/2012    DM (diabetes mellitus) ([facility] [address]     Elevated cholesterol     Hypertension     NSTEMI (non-ST elevated myocardial infarction) ([facility] [address] 11/10/2016     Past Surgical History:   Procedure Laterality Date    COLONOSCOPY N/A 6/28/2018    COLONOSCOPY performed by Reinaldo Cota MD at [address]  11/11/2016    2 stents     Social Hx:  reports that he has never smoked. He has never been exposed to tobacco smoke. He has never used smokeless tobacco. He reports current alcohol use of about 2.0 standard drinks per week. He reports that he does not use drugs. Family Hx: family history includes Elevated Lipids in his brother, brother, and brother; Heart Attack in his father; Heart Disease in his father; Hypertension in his mother; No Known Problems in his daughter, sister, and son.   No Known Allergies       OBJECTIVE:  Wt Readings from Last 3 Encounters:   12/29/22 113 lb 5.1 oz (51.4 kg)   12/19/22 129 lb (58.5 kg)   12/16/22 126 lb 8.7 oz (57.4 kg)       Intake/Output Summary (Last 24 hours) at 12/29/2022 0933  Last data filed at 12/29/2022 0800  Gross per 24 hour   Intake 1032.75 ml   Output 2645 ml   Net -1612.25 ml             Physical Exam    Vitals:   Vitals:    12/29/22 0700 12/29/22 0800 12/29/22 0856 12/29/22 0900   BP: (!) 102/53 (!) 121/58  125/64   Pulse: 94 94  96   Resp: 14 14  14   Temp: 98.1 °F (36.7 °C) 97.9 °F (36.6 °C)  97.9 °F (36.6 °C)   SpO2: 97% 95% 99% 100%   Weight:       Height:         Telemetry: normal sinus rhythm    /64   Pulse 96   Temp 97.9 °F (36.6 °C)   Resp 14   Ht 5' 9\" (1.753 m)   Wt 113 lb 5.1 oz (51.4 kg)   SpO2 100%   BMI 16.73 kg/m²   General:    Alert, cooperative, no distress. Psychiatric:    Normal Mood and affect    Eye/ENT:      Pupils equal, No asymmetry, Conjunctival pink. Able to hear voice at normal amplitude   Lungs:      Visibly symmetric chest expansion, No palpable tenderness. clear   Heart[de-identified]    Regular rate and rhythm, S1, S2 normal, no murmur, click, rub or gallop. No JVD, Normal palpable peripheral pulses. No cyanosis   Abdomen:     Soft, non-tender. Bowel sounds normal. No masses,  No      organomegaly. Extremities:   Extremities normal, atraumatic, no edema. Neurologic:   CN II-XII grossly intact. No gross focal deficits           Data Review:     Radiology:   XR Results (most recent):  Results from Hospital Encounter encounter on 12/22/22    XR CHEST PORT    Narrative  EXAM: XR CHEST PORT    DATE: 12/24/2022 6:04 PM    INDICATION: CVC placement    COMPARISON: Chest radiograph December 24, 2022 at 1412 hours    FINDINGS: AP portable chest radiograph. There is a new RIGHT IJ central venous  catheter with the tip near the cavoatrial junction. Patient is status post  sternotomy and CABG. The heart is mildly enlarged but stable. The lungs are  hyperinflated. There are persistent, small bilateral pleural effusions. There is  no pneumothorax. A skin fold projects over the RIGHT upper lung.  The vascular  clarity is again mildly diminished. Impression  1. RIGHT IJ catheter is in satisfactory position with the tip at the cavoatrial  junction. No pneumothorax. 2. No change in small bilateral effusions. CT Results (most recent):  Results from Hospital Encounter encounter on 12/22/22    CT ABD PELV WO CONT    Narrative  EXAM: CT ABD PELV WO CONT    INDICATION: rlq abdominal pain    COMPARISON: 5/3/2014    IV CONTRAST: None. ORAL CONTRAST: None    TECHNIQUE:  Thin axial images were obtained through the abdomen and pelvis. Coronal and  sagittal reformats were generated. CT dose reduction was achieved through use of  a standardized protocol tailored for this examination and automatic exposure  control for dose modulation. The absence of intravenous contrast material reduces the sensitivity for  evaluation of the vasculature and solid organs. FINDINGS:  LOWER THORAX: Small bilateral pleural effusions. Partial collapse bases  LIVER: No mass. BILIARY TREE: Gallstones. No evidence of acute cholecystitis CBD is not dilated. SPLEEN: within normal limits. PANCREAS: No focal abnormality. ADRENALS: 19 mm right adrenal nodule unchanged,  KIDNEYS/URETERS: Mild bilateral hydronephrosis. No stone  STOMACH: Unremarkable. SMALL BOWEL: No dilatation or wall thickening. COLON: No dilatation or wall thickening. APPENDIX: Surgically absent  PERITONEUM: No ascites or pneumoperitoneum. RETROPERITONEUM: No lymphadenopathy or aortic aneurysm. Extensive vascular  calcifications  REPRODUCTIVE ORGANS: Mildly enlarged  URINARY BLADDER: Massive distention  BONES: No destructive bone lesion. ABDOMINAL WALL: Small umbilical hernia containing fat. ADDITIONAL COMMENTS: N/A    Impression  1. Bladder is massively distended with mild bilateral hydronephrosis. May  indicate bladder outlet obstruction. Prostate is mildly enlarged  2. Gallstones. No acute cholecystitis  3.  Small bilateral pleural effusions    MRI Results (most recent):  Results from Hospital Encounter encounter on 05/22/16    MRI LUMB SPINE WO CONT    Narrative  **Final Report**      ICD Codes / Adm. Diagnosis: 724.2  M54.5 / Lumbago  Low back pain  Examination:  MR Geneva Askew CON  - [id number] - May 22 2016  1:45PM  Accession No:  [id number]  Reason:  Low back pain      REPORT:  Indication: Pain and back extends into right leg to foot    Exam: MRI of the lumbar spine. Sequences include sagittal and axial T1 and  T2-weighted images. Sagittal STIR. Comparisons: April 27, 2016    Contrast: None    Findings: Alignment is normal. There is no evidence of marrow replacement or  acute fracture. Cord terminus is within normal limits. Paraspinous soft  tissues are within normal limits. T12-L1: No stenosis    L1-L2: There is desiccation of this disc with a small bulge but no stenosis    L2-L3: There is desiccation of this disc with a small bulge but no stenosis    L3-L4: There is mild left facet arthropathy but no stenosis    L4-L5: There is disc height loss with a small disc bulge. Facet arthropathy. No stenosis    L5-S1: There is disc height loss with a small disc bulge and left  paracentral disc extrusion extending inferiorly. This mildly distorts the  left S1 nerve root in the subarticular zone and left lateral recess. There  are facet degenerative changes with moderate left and mild-to-moderate right  foraminal narrowing    Impression  :  1. Multilevel degenerative change detailed by level above most significant  at L5-S1                Signing/Reading Doctor: Mary Schwartz ([id number])  Approved: Mary Schwartz ([id number])  May 23 2016  8:39AM        No results for input(s): CPK, TROIQ in the last 72 hours.     No lab exists for component: CKQMB, CPKMB, BMPP  Recent Labs     12/29/22  0411 12/28/22  1558 12/28/22  0354   * 131* 133*   K 3.4* 4.0 3.3*   CL 93* 91* 92*   CO2 28 28 28   BUN 87* 74* 75*   CREA 2.65* 2.77* 2.76*   * 237* 196*   PHOS 5.4*  --  6.5*   CA 10.9* 10.5* 10.5* Recent Labs     12/29/22  0411 12/28/22  0354   WBC 10.3 9.3   HGB 9.6* 9.7*   HCT 32.1* 32.6*    309       Recent Labs     12/29/22  0411 12/28/22  1558   * 185*         No results for input(s): CHOL, LDLC in the last 72 hours.     No lab exists for component: TGL, HDLC,  HBA1C  Recent Labs     12/27/22  1436   TSH 2.12             Current meds:    Current Facility-Administered Medications:     potassium chloride SR (KLOR-CON 10) tablet 40 mEq, 40 mEq, Oral, BID, Walker Garcia MD, 40 mEq at 12/29/22 0612    insulin glargine (LANTUS) injection 40 Units, 40 Units, SubCUTAneous, DAILY, [facility], 40 Units at 12/29/22 0827    diphenhydrAMINE (BENADRYL) capsule 50 mg, 50 mg, Oral, QHS, João Chan MD, 50 mg at 12/28/22 0154    hydrocortisone Sod Succ (PF) (SOLU-CORTEF) injection 50 mg, 50 mg, IntraVENous, Q12H, Colleen Astorga NP, 50 mg at 12/29/22 0803    insulin regular (NOVOLIN R, HUMULIN R) 100 Units in 0.9% sodium chloride 100 mL infusion, 0-50 Units/hr, IntraVENous, TITRATE, Lilian MOLINA MD, Last Rate: 1.5 mL/hr at 12/29/22 0917, 1.5 Units/hr at 12/29/22 0917    heparin (porcine) injection 5,000 Units, 5,000 Units, SubCUTAneous, Q12H, [facility] DO, 5,000 Units at 12/29/22 0804    QUEtiapine (SEROquel) tablet 50 mg, 50 mg, Oral, QHS, David Aguilar DO, 50 mg at 12/27/22 2137    lidocaine 4 % patch 1 Patch, 1 Patch, TransDERmal, Q24H, Lilian MOLINA MD, 1 Patch at 12/28/22 1706    bumetanide (BUMEX) 0.25 mg/mL infusion, 0-2 mg/hr, IntraVENous, TITRATE, Lilian MOLINA MD, Last Rate: 4 mL/hr at 12/29/22 0812, 1 mg/hr at 12/29/22 0812    albuterol-ipratropium (DUO-NEB) 2.5 MG-0.5 MG/3 ML, 3 mL, Nebulization, Q6H RT, Walker Aponte MD, 3 mL at 12/29/22 0856    DOBUTamine (DOBUTREX) 500 mg/250 mL (2,000 mcg/mL) infusion, 5 mcg/kg/min, IntraVENous, CONTINUOUS, Lilian MOLINA MD, Last Rate: 8.5 mL/hr at 12/28/22 1427, 5 mcg/kg/min at 12/28/22 1427    balsam peru-castor oiL (VENELEX) ointment, , Topical, BID, Amy Hardy MD, Given at 12/29/22 0804    NOREPINephrine (LEVOPHED) 8 mg in 5% dextrose 250mL (32 mcg/mL) infusion, 0.5-30 mcg/min, IntraVENous, TITRATE, Santhosh MOLINA MD, Stopped at 12/24/22 2342    alteplase (CATHFLO) 1 mg in sterile water (preservative free) 1 mL injection, 1 mg, InterCATHeter, PRN, Santhosh MOLINA MD    bacitracin 500 unit/gram packet 1 Packet, 1 Packet, Topical, PRN, Santhosh MOLINA MD    glucose chewable tablet 16 g, 4 Tablet, Oral, PRN, Santhosh MOLINA MD    glucagon (GLUCAGEN) injection 1 mg, 1 mg, IntraMUSCular, PRN, Dhara Hendrickson MD    dextrose 10 % infusion 0-250 mL, 0-250 mL, IntraVENous, PRN, Dhara Hendrickson MD    insulin lispro (HUMALOG) injection, , SubCUTAneous, AC&HS, Luci Ferrer DO, 10 Units at 12/28/22 2136    insulin lispro (HUMALOG) injection 3 Units, 0.05 Units/kg, SubCUTAneous, TID WITH MEALS, Santhosh MOLINA MD, 3 Units at 12/29/22 0820    senna-docusate (Kari Sails) 8.6-50 mg per tablet 1 Tablet, 1 Tablet, Oral, BID PRN, Dhara Hendrickson MD, 1 Tablet at 12/26/22 2917    [Held by provider] rosuvastatin (CRESTOR) tablet 10 mg, 10 mg, Oral, QHS, Walker Aponte MD, 10 mg at 12/26/22 2209    bisacodyL (DULCOLAX) suppository 10 mg, 10 mg, Rectal, QHS PRN, Santhosh MOLINA MD    sodium chloride (NS) flush 5-40 mL, 5-40 mL, IntraVENous, Q8H, Walker Aponte MD, 10 mL at 12/29/22 0500    sodium chloride (NS) flush 5-40 mL, 5-40 mL, IntraVENous, PRN, Dhara Hendrickson MD, 10 mL at 12/28/22 0845    acetaminophen (TYLENOL) tablet 650 mg, 650 mg, Oral, Q6H PRN, 650 mg at 12/26/22 1714 **OR** acetaminophen (TYLENOL) suppository 650 mg, 650 mg, Rectal, Q6H PRN, Walker Gao MD    polyethylene glycol (MIRALAX) packet 17 g, 17 g, Oral, DAILY PRN, Dhara Hendrickson MD, 17 g at 12/26/22 0649    ondansetron [facility] ODT) tablet 4 mg, 4 mg, Oral, Q8H PRN **OR** ondansetron (ZOFRAN) injection 4 mg, 4 mg, IntraVENous, Q6H PRN, Coleen MOLINA MD, 4 mg at 12/24/22 0849    aspirin delayed-release tablet 81 mg, 81 mg, Oral, DAILY, Ranjit Pearson MD, 81 mg at 12/29/22 6480    clopidogreL (PLAVIX) tablet 75 mg, 75 mg, Oral, DAILY, Ranjit Pearson MD, 75 mg at 12/29/22 9591    [Held by provider] empagliflozin (JARDIANCE) tablet 10 mg, 10 mg, Oral, DAILY, Joseph Castleman, MD    ergocalciferol capsule 50,000 Units, 50,000 Units, Oral, Q7D, Brian SALMERON MD, 50,000 Units at 12/23/22 0923    ipratropium (ATROVENT) 21 mcg (0.03 %) nasal spray 2 Spray, 2 Spray, Both Nostrils, Q12H, Ranjit Pearson MD, 2 [place] at 12/29/22 0919    [Held by provider] ivabradine (CORLANOR) tablet 2.5 mg, 2.5 mg, Oral, BID WITH MEALS, Ranjit Pearson MD, 2.5 mg at 12/23/22 0850    tamsulosin (FLOMAX) capsule 0.4 mg, 0.4 mg, Oral, DAILY, Ranjit Pearson MD, 0.4 mg at 12/29/22 0804    sodium chloride (NS) flush 5-40 mL, 5-40 mL, IntraVENous, PRN, MD Francine Lewis MD  [facility] [address] [address]  [facility]  [phone]      Crista Sawyer MD

## 2022-12-29 NOTE — PROGRESS NOTES
Physical Therapy  12/29/2022    Chart reviewed. Patient off the floor for nuclear stress test. Will follow. Thank you.     Janis Heart, PT, DPT

## 2022-12-30 ENCOUNTER — TRANSCRIBE ORDER (OUTPATIENT)
Dept: SCHEDULING | Age: 72
End: 2022-12-30

## 2022-12-30 LAB
ALBUMIN SERPL-MCNC: 4.4 G/DL (ref 3.5–5)
ALBUMIN/GLOB SERPL: 1.3 (ref 1.1–2.2)
ALP SERPL-CCNC: 167 U/L (ref 45–117)
ALT SERPL-CCNC: 252 U/L (ref 12–78)
ANION GAP SERPL CALC-SCNC: 12 MMOL/L (ref 5–15)
AST SERPL-CCNC: 42 U/L (ref 15–37)
BASOPHILS # BLD: 0 K/UL (ref 0–0.1)
BASOPHILS NFR BLD: 0 % (ref 0–1)
BILIRUB SERPL-MCNC: 0.7 MG/DL (ref 0.2–1)
BUN SERPL-MCNC: 94 MG/DL (ref 6–20)
BUN/CREAT SERPL: 38 (ref 12–20)
CALCIUM SERPL-MCNC: 10.5 MG/DL (ref 8.5–10.1)
CHLORIDE SERPL-SCNC: 95 MMOL/L (ref 97–108)
CO2 SERPL-SCNC: 27 MMOL/L (ref 21–32)
CREAT SERPL-MCNC: 2.47 MG/DL (ref 0.7–1.3)
DIFFERENTIAL METHOD BLD: ABNORMAL
EOSINOPHIL # BLD: 0 K/UL (ref 0–0.4)
EOSINOPHIL NFR BLD: 0 % (ref 0–7)
ERYTHROCYTE [DISTWIDTH] IN BLOOD BY AUTOMATED COUNT: 18.6 % (ref 11.5–14.5)
GLOBULIN SER CALC-MCNC: 3.5 G/DL (ref 2–4)
GLUCOSE BLD STRIP.AUTO-MCNC: 147 MG/DL (ref 65–117)
GLUCOSE BLD STRIP.AUTO-MCNC: 341 MG/DL (ref 65–117)
GLUCOSE BLD STRIP.AUTO-MCNC: 346 MG/DL (ref 65–117)
GLUCOSE BLD STRIP.AUTO-MCNC: 355 MG/DL (ref 65–117)
GLUCOSE SERPL-MCNC: 186 MG/DL (ref 65–100)
HCT VFR BLD AUTO: 30.7 % (ref 36.6–50.3)
HGB BLD-MCNC: 9.2 G/DL (ref 12.1–17)
IMM GRANULOCYTES # BLD AUTO: 0.1 K/UL (ref 0–0.04)
IMM GRANULOCYTES NFR BLD AUTO: 1 % (ref 0–0.5)
LYMPHOCYTES # BLD: 0.4 K/UL (ref 0.8–3.5)
LYMPHOCYTES NFR BLD: 4 % (ref 12–49)
MAGNESIUM SERPL-MCNC: 2.8 MG/DL (ref 1.6–2.4)
MCH RBC QN AUTO: 22.5 PG (ref 26–34)
MCHC RBC AUTO-ENTMCNC: 30 G/DL (ref 30–36.5)
MCV RBC AUTO: 75.2 FL (ref 80–99)
MONOCYTES # BLD: 0.4 K/UL (ref 0–1)
MONOCYTES NFR BLD: 4 % (ref 5–13)
NEUTS SEG # BLD: 10.1 K/UL (ref 1.8–8)
NEUTS SEG NFR BLD: 91 % (ref 32–75)
NRBC # BLD: 0 K/UL (ref 0–0.01)
NRBC BLD-RTO: 0 PER 100 WBC
PHOSPHATE SERPL-MCNC: 5.4 MG/DL (ref 2.6–4.7)
PLATELET # BLD AUTO: 325 K/UL (ref 150–400)
PMV BLD AUTO: 10.7 FL (ref 8.9–12.9)
POTASSIUM SERPL-SCNC: 3.4 MMOL/L (ref 3.5–5.1)
PROCALCITONIN SERPL-MCNC: 0.33 NG/ML
PROT SERPL-MCNC: 7.9 G/DL (ref 6.4–8.2)
RBC # BLD AUTO: 4.08 M/UL (ref 4.1–5.7)
RBC MORPH BLD: ABNORMAL
SERVICE CMNT-IMP: ABNORMAL
SODIUM SERPL-SCNC: 134 MMOL/L (ref 136–145)
STRESS TARGET HR: 149 BPM
WBC # BLD AUTO: 11 K/UL (ref 4.1–11.1)

## 2022-12-30 PROCEDURE — 99233 SBSQ HOSP IP/OBS HIGH 50: CPT | Performed by: INTERNAL MEDICINE

## 2022-12-30 PROCEDURE — 84100 ASSAY OF PHOSPHORUS: CPT

## 2022-12-30 PROCEDURE — 74011250637 HC RX REV CODE- 250/637: Performed by: FAMILY MEDICINE

## 2022-12-30 PROCEDURE — 84145 PROCALCITONIN (PCT): CPT

## 2022-12-30 PROCEDURE — 74011000250 HC RX REV CODE- 250: Performed by: STUDENT IN AN ORGANIZED HEALTH CARE EDUCATION/TRAINING PROGRAM

## 2022-12-30 PROCEDURE — 83735 ASSAY OF MAGNESIUM: CPT

## 2022-12-30 PROCEDURE — 74011250636 HC RX REV CODE- 250/636: Performed by: INTERNAL MEDICINE

## 2022-12-30 PROCEDURE — 82962 GLUCOSE BLOOD TEST: CPT

## 2022-12-30 PROCEDURE — 74011250636 HC RX REV CODE- 250/636: Performed by: NURSE PRACTITIONER

## 2022-12-30 PROCEDURE — 74011250636 HC RX REV CODE- 250/636: Performed by: STUDENT IN AN ORGANIZED HEALTH CARE EDUCATION/TRAINING PROGRAM

## 2022-12-30 PROCEDURE — 94640 AIRWAY INHALATION TREATMENT: CPT

## 2022-12-30 PROCEDURE — 85025 COMPLETE CBC W/AUTO DIFF WBC: CPT

## 2022-12-30 PROCEDURE — 97535 SELF CARE MNGMENT TRAINING: CPT

## 2022-12-30 PROCEDURE — 97116 GAIT TRAINING THERAPY: CPT

## 2022-12-30 PROCEDURE — 36415 COLL VENOUS BLD VENIPUNCTURE: CPT

## 2022-12-30 PROCEDURE — 74011250637 HC RX REV CODE- 250/637: Performed by: STUDENT IN AN ORGANIZED HEALTH CARE EDUCATION/TRAINING PROGRAM

## 2022-12-30 PROCEDURE — 74011000250 HC RX REV CODE- 250: Performed by: INTERNAL MEDICINE

## 2022-12-30 PROCEDURE — 99232 SBSQ HOSP IP/OBS MODERATE 35: CPT | Performed by: NURSE PRACTITIONER

## 2022-12-30 PROCEDURE — 74011636637 HC RX REV CODE- 636/637: Performed by: STUDENT IN AN ORGANIZED HEALTH CARE EDUCATION/TRAINING PROGRAM

## 2022-12-30 PROCEDURE — 74011250637 HC RX REV CODE- 250/637: Performed by: INTERNAL MEDICINE

## 2022-12-30 PROCEDURE — 65660000001 HC RM ICU INTERMED STEPDOWN

## 2022-12-30 PROCEDURE — 80053 COMPREHEN METABOLIC PANEL: CPT

## 2022-12-30 RX ADMIN — IPRATROPIUM BROMIDE 2 SPRAY: 21 SPRAY, METERED NASAL at 08:26

## 2022-12-30 RX ADMIN — QUETIAPINE FUMARATE 50 MG: 25 TABLET ORAL at 21:15

## 2022-12-30 RX ADMIN — DOBUTAMINE HYDROCHLORIDE 3 MCG/KG/MIN: 200 INJECTION INTRAVENOUS at 06:32

## 2022-12-30 RX ADMIN — HEPARIN SODIUM 5000 UNITS: 5000 INJECTION INTRAVENOUS; SUBCUTANEOUS at 21:15

## 2022-12-30 RX ADMIN — Medication: at 08:26

## 2022-12-30 RX ADMIN — Medication 9 UNITS: at 21:15

## 2022-12-30 RX ADMIN — PANTOPRAZOLE SODIUM 40 MG: 40 TABLET, DELAYED RELEASE ORAL at 08:25

## 2022-12-30 RX ADMIN — SODIUM CHLORIDE, PRESERVATIVE FREE 10 ML: 5 INJECTION INTRAVENOUS at 21:16

## 2022-12-30 RX ADMIN — Medication 4 UNITS: at 08:24

## 2022-12-30 RX ADMIN — IPRATROPIUM BROMIDE AND ALBUTEROL SULFATE 3 ML: .5; 3 SOLUTION RESPIRATORY (INHALATION) at 07:57

## 2022-12-30 RX ADMIN — HYDROCORTISONE SODIUM SUCCINATE 50 MG: 100 INJECTION, POWDER, FOR SOLUTION INTRAMUSCULAR; INTRAVENOUS at 21:15

## 2022-12-30 RX ADMIN — SODIUM CHLORIDE, PRESERVATIVE FREE 10 ML: 5 INJECTION INTRAVENOUS at 06:38

## 2022-12-30 RX ADMIN — POTASSIUM CHLORIDE 40 MEQ: 750 TABLET, FILM COATED, EXTENDED RELEASE ORAL at 08:25

## 2022-12-30 RX ADMIN — Medication: at 17:33

## 2022-12-30 RX ADMIN — POTASSIUM CHLORIDE 40 MEQ: 750 TABLET, FILM COATED, EXTENDED RELEASE ORAL at 17:35

## 2022-12-30 RX ADMIN — SODIUM CHLORIDE, PRESERVATIVE FREE 10 ML: 5 INJECTION INTRAVENOUS at 17:30

## 2022-12-30 RX ADMIN — DIPHENHYDRAMINE HYDROCHLORIDE 50 MG: 25 CAPSULE ORAL at 21:15

## 2022-12-30 RX ADMIN — INSULIN GLARGINE 40 UNITS: 100 INJECTION, SOLUTION SUBCUTANEOUS at 08:25

## 2022-12-30 RX ADMIN — IPRATROPIUM BROMIDE AND ALBUTEROL SULFATE 3 ML: .5; 3 SOLUTION RESPIRATORY (INHALATION) at 19:32

## 2022-12-30 RX ADMIN — ASPIRIN 81 MG: 81 TABLET, COATED ORAL at 08:25

## 2022-12-30 RX ADMIN — HEPARIN SODIUM 5000 UNITS: 5000 INJECTION INTRAVENOUS; SUBCUTANEOUS at 08:25

## 2022-12-30 RX ADMIN — HYDROCORTISONE SODIUM SUCCINATE 50 MG: 100 INJECTION, POWDER, FOR SOLUTION INTRAMUSCULAR; INTRAVENOUS at 08:26

## 2022-12-30 RX ADMIN — IPRATROPIUM BROMIDE AND ALBUTEROL SULFATE 3 ML: .5; 3 SOLUTION RESPIRATORY (INHALATION) at 16:51

## 2022-12-30 RX ADMIN — IPRATROPIUM BROMIDE AND ALBUTEROL SULFATE 3 ML: .5; 3 SOLUTION RESPIRATORY (INHALATION) at 01:45

## 2022-12-30 RX ADMIN — TAMSULOSIN HYDROCHLORIDE 0.4 MG: 0.4 CAPSULE ORAL at 08:25

## 2022-12-30 RX ADMIN — Medication 15 UNITS: at 12:27

## 2022-12-30 RX ADMIN — BUMETANIDE 0.5 MG/HR: 0.25 INJECTION, SOLUTION INTRAMUSCULAR; INTRAVENOUS at 06:37

## 2022-12-30 RX ADMIN — Medication 9 UNITS: at 17:32

## 2022-12-30 RX ADMIN — IPRATROPIUM BROMIDE 2 SPRAY: 21 SPRAY, METERED NASAL at 21:16

## 2022-12-30 NOTE — PROGRESS NOTES
CRITICAL CARE NOTE      Name: Eloisa August   : 1950   MRN: [id number]   Date: 2022      Reason for ICU Admission: Cardiogenic shock     ICU PROBLEM LIST   Acute on chronic HFrEF (25-30%)  Acute hypoxic respiratory failure  TANNER on CKD3  Cardiogenic shock, resolving  Lactic acidosis  NSTEMI  CAD  DM  transaminitis    HISTORY OF PRESENT ILLNESS:   Pt is a 71 y.o M w/ PMH as noted above who presented to ED due to progressive dyspnea. History obtained from pt and spouse at bedside. Onset 3-4 days ago, DONALDSON, abdominal discomfort and distension with associated anorexia not improved by increasing home lasix dose. States difficulty voiding despite lasix doses. Of note pt w/ recent frequent admissions for HFrEF. Denies sick contacts, CP, palpitations,     Pt tachycardic, hypotensive, hypoxemic on presentation. Labs w/ troponemia, BNP above baseline, elevated lactate, and TANNER. Imaging w/ bilateral pulmonary edema, bl pulmonary effusions, bladder distension 2/2 BPH and hydronephrosis. Pt pan cultured, given dose of abx and dose of IVP lasix. Pt placed on BiPAP w/ improvement in oxygenation. Vasquez placed w/ bladder decompression. Pt initially admitted to hospitalist service, however CCM consulted due to worsening clinical status. Admitted to CCU. Respiratory status improved with diuresis, weaned vasopressors. 24 HOUR EVENTS:    - start mini pulse steroids. Req inuslin gtt. NEUROLOGICAL:    - Mentation appropriate  - Monitor for acute change  Seroquel 50mg qhs    PULMONOLOGY:   - O2 per NC PRN to maintain spO2 >92%  - Suspect may require home O2 as becomes hypoxemic w/ activity  - Monitor bl effusions      CARDIOVASCULAR:   - C/w dobutamine and bumex gtt  - TTE  25% with LAD, reduce TAPSE 1.1 cm, mild MR,   - Holding GDMT due to hypotension  - ASA, plavix, statin  - Cardiology and HF consults - work up for HFE - ?cardiomyopathy.   Minipulse Steroids per Cardiology - 1mg/kg QD  Restart ASA, d/c plavix -  plan for cardiac MRI in coming week. GASTROINTESTINAL:   - Cardiac, diabetic diet  - Bowel regimen for constipation  - shock liver, trending down    RENAL/ELECTROLYTE/FLUIDS:   - Strict I/Os  - Suspect initially post obstructive failure, now Cr uptrending w/ likely ATN  - continue bumex as above, start diamox to support diuresis  - per Urology consult for obstruction, donnelly to remain in place for at least 7-10 days  - US w/ resolving hydronephrosis  - C/w flomax  - Nephrology consulted in event of TANNER progression    ENDOCRINE:   -insulin gtt, incr lantus 40U. Plan to move off gtt in next 24 hours.  Increased req with steroids  - Glucose goal 120-180    HEMATOLOGY/ONCOLOGY:   Change to hep ppx  AoCD     ID/MICRO:   UA negative - + blood, LE  Urine Cx negative -- stop abx, completed 5 d zosyn    PCT neg  ICU DAILY CHECKLIST     Code Status:DNR/DNI  DVT Prophylaxis: antoni  T/L/D: PIV, donnelly  SUP: N/A  Diet: cardiac, diabetic  Activity Level:ad jose f  ABCDEF Bundle/Checklist Completed:Yes  Disposition: Transfer to SDU  Multidisciplinary Rounds Completed:yes  Patient/Family Updated: Yes    [facility]   As noted above    SUBJECTIVE:   As noted above    Review of Systems:       OBJECTIVE:     Labs and Data: Reviewed 22  Medications: Reviewed 22  Imaging: Reviewed 22    General - in bed, chornically ill, edematous  HEENT- pupils equal, EOMI, anicteric  Neuro - follows basic commands, no focal deficit  CV - RRR  Resp - CTAB, shallow lung volumes  Abd - soft, nontender, no guarding   - + donnelly  MSK- intact, no sacral ulcer       Visit Vitals  /66   Pulse 91   Temp 97.2 °F (36.2 °C)   Resp 16   Ht 5' 9\" (1.753 m)   Wt 50.1 kg (110 lb 7.2 oz)   SpO2 98%   BMI 16.31 kg/m²    O2 Flow Rate (L/min): 2 l/min O2 Device: None (Room air) Temp (24hrs), Av.2 °F (36.8 °C), Min:97.2 °F (36.2 °C), Max:99 °F (37.2 °C)    CVP (mmHg): 7 mmHg (22 0500)      Intake/Output: Intake/Output Summary (Last 24 hours) at 12/30/2022 1142  Last data filed at 12/30/2022 1000  Gross per 24 hour   Intake 412.34 ml   Output 2090 ml   Net -1677.66 ml       Imaging    12/11/22    ECHO ADULT FOLLOW-UP OR LIMITED 12/14/2022 12/14/2022    Interpretation Summary    Left Ventricle: Severely reduced left ventricular systolic function with a visually estimated EF of 25 - 30%. Not well visualized. Left ventricle size is normal. Normal wall thickness. Normal wall motion. Normal diastolic function. Mitral Valve: Thickened leaflet. Moderate annular calcification of the mitral valve. Mild regurgitation. Contrast used: Definity. Note: image quality is poor, and even with Definity contrast, EF is very difficult to estimate, and the reported figure is approximate at best. Consider alternative imaging modalities such as MUGA or cardiac MRI to more accurately assess. Signed by: Chino Jean Baptiste MD on 12/14/2022  4:46 PM           CRITICAL CARE DOCUMENTATION  I had a face to face encounter with the patient, reviewed and interpreted patient data including clinical events, labs, images, vital signs, I/O's, and examined patient. I have discussed the case and the plan and management of the patient's care with the consulting services, the bedside nurses and the respiratory therapist.      NOTE OF PERSONAL INVOLVEMENT IN CARE   This patient has a high probability of imminent, clinically significant deterioration, which requires the highest level of preparedness to intervene urgently. I participated in the decision-making and personally managed or directed the management of the following life and organ supporting interventions that required my frequent assessment to treat or prevent imminent deterioration. I personally spent 30 minutes of critical care time. This is time spent at this critically ill patient's bedside actively involved in patient care as well as the coordination of care.   This does not include any procedural time which has been billed separately.     Justo Iniguez DO  Staff Intensivist  [facility]  12/30/2022

## 2022-12-30 NOTE — PROGRESS NOTES
Verbal bedside shift change report given to Cheyenne Eller (oncoming nurse) by MOM (offgoing nurse). Report included the following information SBAR, Kardex, Intake/Output, MAR, Recent Results, Cardiac Rhythm Sinus Rhythm, and Alarm Parameters.       0730 Report to [facility], uneventful night

## 2022-12-30 NOTE — PROGRESS NOTES
RENAL  PROGRESS NOTE        Subjective:   Asleep on rounds this am  Seen very early  Objective:   VITALS SIGNS:    Visit Vitals  BP (!) 109/54   Pulse 95   Temp 99.1 °F (37.3 °C)   Resp 20   Ht 5' 9\" (1.753 m)   Wt 50.1 kg (110 lb 7.2 oz)   SpO2 100%   BMI 16.31 kg/m²       O2 Device: None (Room air)   O2 Flow Rate (L/min): 2 l/min   Temp (24hrs), Av °F (36.7 °C), Min:97.2 °F (36.2 °C), Max:99.1 °F (37.3 °C)         PHYSICAL EXAM:  No edema  NAD  DATA REVIEW:     INTAKE / OUTPUT:   Last shift:       07 - 1900  In: 33.5 [I.V.:33.5]  Out: 890 [Urine:890]  Last 3 shifts: 1901 -  0700  In: 631.2 [P.O.:220; I.V.:411.2]  Out: 3635 [Urine:3635]    Intake/Output Summary (Last 24 hours) at 2022 1759  Last data filed at 2022 1730  Gross per 24 hour   Intake 352.9 ml   Output 2370 ml   Net -2017.1 ml           LABS:   Recent Labs     22  0314 22  0411 22  0354   WBC 11.0 10.3 9.3   HGB 9.2* 9.6* 9.7*   HCT 30.7* 32.1* 32.6*    337 309       Recent Labs     22  0314 22  1629 22  0411 22  1558 22  0354   * 134* 135*   < > 133*   K 3.4* 3.6 3.4*   < > 3.3*   CL 95* 92* 93*   < > 92*   CO2 27 30 28   < > 28   * 124* 126*   < > 196*   BUN 94* 96* 87*   < > 75*   CREA 2.47* 2.66* 2.65*   < > 2.76*   CA 10.5* 10.7* 10.9*   < > 10.5*   MG 2.8*  --  2.9*  --  2.7*   PHOS 5.4*  --  5.4*  --  6.5*   ALB 4.4 4.4 4.6   < > 4.8   TBILI 0.7 0.7 0.6   < > 1.0   * 300* 377*   < > 568*    < > = values in this interval not displayed.              Assessment:   CKD-3b  TANNER,in the setting of 1-urinary retention 2-poor hemodynamics 3-low EF at risk for CRS            Non oliguric,creat slightly better  Urinary retention/hydro s/p donnelly  Hyponatremia better  hypokalemia  Cardiomyopathy/low EF  Anemia    Hypercalcemia started on high dose po vit D on       Bumex gtt   0.5 mg/hr  On   Creat slightly better  OFF vit D  On oral k replacement  SPEP pending,check FLC ,PTH for now--ordered yesterday, not done. ??     Chaparro Jean MD

## 2022-12-30 NOTE — PROGRESS NOTES
Problem: Self Care Deficits Care Plan (Adult)  Goal: *Acute Goals and Plan of Care (Insert Text)  Description: FUNCTIONAL STATUS PRIOR TO ADMISSION: Patient was independent and active without use of DME. 2nd readmission in past month. Was completing ADLs and IADls without difficulty, went home on RA. Reports having difficulty doing stairs 2/2 increased SOB resulting in readmission. HOME SUPPORT: The patient lived with spouse but did not require assist.    Occupational Therapy Goals  Initiated 12/24/2022; continue goals 12/29  1. Patient will perform upper body dressing with supervision/set-up within 7 day(s). 2.  Patient will perform lower body dressing with supervision/set-up within 7 day(s). 3.  Patient will perform bathing with supervision/set-up within 7 day(s). 4.  Patient will perform toilet transfers with supervision/set-up within 7 day(s). 5.  Patient will perform all aspects of toileting with supervision/set-up within 7 day(s). 6.  Patient will participate in upper extremity therapeutic exercise/activities with supervision/set-up for 5 minutes within 7 day(s). 7.  Patient will utilize energy conservation techniques during functional activities with verbal cues within 7 day(s). Outcome: Progressing Towards Goal   OCCUPATIONAL THERAPY TREATMENT  Patient: Cale Payne (76 y.o. male)  Date: 12/30/2022  Diagnosis: [facility] [A41.9] <principal problem not specified>      Precautions: Fall  Chart, occupational therapy assessment, plan of care, and goals were reviewed. ASSESSMENT  Patient continues with skilled OT services and is progressing towards goals. Patient received reclined in bed, amenable to session. Patient with overall improvements in activity tolerance, ADL, and functional mobility compared to prior session. BP remained stable throughout session, patient asymptomatic. Completed EOB grooming with overall supervision and extended time.  Able to complete functional mobility out in hallway with light handheld assist, maintained BP/HR/SPO2 throughout. Educated on energy conservation techniques to maintain activity tolerance with mobility, patient verbalized understanding and demo'd good carryover. Patient left seated in chair, all needs in reach, NAD. Current Level of Function Impacting Discharge (ADLs): up to x1 assist mobility/ADL assist    Other factors to consider for discharge: below baseline, improved BP control with OOB activity         PLAN :  Patient continues to benefit from skilled intervention to address the above impairments. Continue treatment per established plan of care to address goals. Recommend with staff: OOB 3x daily for meals, functional mobility to bathroom    Recommend next OT session: standing grooming    Recommendation for discharge: (in order for the patient to meet his/her long term goals)  Therapy 3 hours per day 5-7 days per week vs HH with family support pending progress    This discharge recommendation:  Has not yet been discussed the attending provider and/or case management    IF patient discharges home will need the following DME: TBD pending progress       SUBJECTIVE:   Patient stated Every day, little by little.  re: improvements made during session    OBJECTIVE DATA SUMMARY:   Cognitive/Behavioral Status:  Neurologic State: Alert  Orientation Level: Oriented X4  Cognition: Appropriate decision making  Perception: Appears intact  Perseveration: No perseveration noted  Safety/Judgement: Awareness of environment    Functional Mobility and Transfers for ADLs:  Bed Mobility:  Rolling: Contact guard assistance  Supine to Sit: Minimum assistance  Scooting: Contact guard assistance    Transfers:  Sit to Stand: Minimum assistance          Balance:  Sitting: Intact; Without support  Standing: Impaired; With support  Standing - Static: Fair  Standing - Dynamic : Fair    ADL Intervention:       Grooming  Position Performed: Seated edge of bed  Washing Face: Supervision  Brushing Teeth: Supervision  Cues: Verbal cues provided                                  Cognitive Retraining  Safety/Judgement: Awareness of environment    Pain:  None reported    Activity Tolerance:   Fair, tolerates ADLs without rest breaks, and requires rest breaks    After treatment patient left in no apparent distress:   Sitting in chair, Call bell within reach, and Caregiver / family present    COMMUNICATION/COLLABORATION:   The patients plan of care was discussed with: Physical therapist and Registered nurse.      Hernando Miles OT  Time Calculation: 30 mins

## 2022-12-30 NOTE — PROGRESS NOTES
[facility]  Cardiology Care Note                  []Initial visit     [x]Established visit     Patient Name: Willie Guardado - XYV:[id number]  Primary Cardiologist: Kishore Rosas MD  Consulting Cardiologist: Rosie Barron MD     Reason for initial visit:  dyspnea, decompensated HF, tachycardia. HPI:     CAD with CABG 6/9/2018. NSTEMI in November 2016 and resolved pulmonary HTN. Stent to circumflex an LAD in 2016. Stress echo in 2018 with a drop in BP with exercise and a drop in EF. Cath on 6/22/18 that showed even with a 5F catheter, he dampened with suspected ostial 3 vessel disease. Echo 3/27/2021 = EF 55 to 60% mild AR MR TR LAE MAC  Nuclear 3/18/2021 EF 64% medium size defect apical and inferior lateral reversible ischemia medium defect mid and inferolateral nonreversible appear to be infarction intermediate risk stress test .  PCI 02/28/2022--patent LIMA to LAD, vein graft to distal RCA with severe disease in the native vessels Occluded vein graft to OM 2. Native OM 2 severely diseased along with proximal to mid circumflex as well as distal left main. Overall the vessel is significantly remodeled negatively. Additionally there is significant disease in the first marginal branch which is even smaller as well as AV groove branch right at the takeoff of OM2. Single stent 2.25 x 28 mm Xience was placed extending from the OM 2 into the circumflex into the distal left main and sequentially dilated with 2.25 noncompliant, 2 5 noncompliant, 3 oh noncompliant and 3 5 noncompliant to perform multilevel POT to manage multiple bifurcations on the way. Atherectomy was considered but given small size of the vessels, was not deemed to be absolutely safe for the obtuse marginal lesion. Overall stent expanded fairly well related to the size of the vessels.  Normal LVEDP  Nuclear stress October 27, 2022 test showed ischemia similar to 3/15/2021 with a medium size defect in the mid to distal inferolateral and anterolateral segments in the circumflex territory he had septal dyskinesia with an EF of 39%. He had a fixed apical infarct  Echocardiogram October 27, 2022 showed an EF of 45 to 50% TAPSE at 1.2 showing reduced RV function sclerosis of the aortic valve with stenosis that was very mild with a valve area of mean of 7 mmHg and HUY of 1.4 cm². The mitral was thickened with restricted mobility and mild MR.      SUBJECTIVE:    Remains on low dose dobutamine  Bumex gtt IV  Creatinine slightly improved  LFTS slightly down  Discussed with family at bedside        Assessment and Plan       1. Dyspnea/decompensated HFrEF- EF 25-30%. Continue low dose dobutamine + IV bumex gtt. Making urine. Creatinine and LFTS slightly decreased. Rapid deterioration in LV function with overall WMA on recent echo appears not to be in teritorry of ischemia suggesting alternative mechanism fo cardiomyopathy(stress SMP vs myocarditis). Basal segments appear to be hyperdynamic compared to apical segments which may point towards a possibility of stress cardiomyopathy. [facility]      2  CAD - sp CABG in 2018, cath 9/8/2021 open LIMA, PCI to OM 2 all the way up to the left main 2/28/2022. Cath 12/6/2022 open LIMA and RCA graft some in-stent restenosis in the long graft from the left main to OM 2 but not severe and the OM1 was more severely diseased in the small vessel. Interventional cardiology felt no reasonable vessel to intervene and recommended medical management. The OM disease is consistent with a lateral wall ischemia demonstrated on prior nuclear stress test.  Continue aspirin and Plavix noted mild anemia-hemoglobin on admission (10.8)    3. Aortic stenosis mild mean gradient of 7 nn Hg, HUY 1.4 by echo 11/27/2022 -mild , murmur     4.  Diabetes mellitus type 2- On insulin lispro and alogliptin    5. PAD: S/P Right SFA PTCA. 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 Left ventricle size is normal. Normal wall thickness. Normal wall motion. Abnormal diastolic function. Right Ventricle: Reduced systolic function. TAPSE is 1.2 cm. Aortic Valve: Valve structure is normal. Mild sclerosis of the aortic valve cusp. Mild annular calcification. Mild regurgitation. Mild stenosis of the aortic valve. AV mean gradient is 7 mmHg. AV peak gradient is 13 mmHg. LVOT:AV VTI Index is 0.55. AV area by peak velocity is 1.4 cm2. Mitral Valve: Moderately thickened leaflet. Mild annular calcification of the mitral valve. The posterior leaflet of the mitral valve has restricted mobility. Mild regurgitation. Left Atrium: Left atrium is dilated. Signed by: Arabella Lee MD on 10/27/2022  4:49 PM        10/27/22    NUCLEAR CARDIAC STRESS TEST 10/27/2022 11/1/2022    Interpretation Summary    Nuclear Findings: The study is positive for myocardial ischemia. The defect appears to be ischemia. The areas of ischemia are similar to 3/15/2021 study. Nuclear Findings: There is a moderate severity left ventricular stress perfusion defect that is medium in size present in the mid to distal inferolateral and anterolateral segment(s) that is reversible. The defect is consistent with abnormal perfusion in the LCx territory. There is normal wall motion in the defect area. The defect appears to be probable ischemia. There is a moderate severity second left ventricular stress perfusion defect. The defect appears to be infarction.     Nuclear Findings: Abnormal left ventricular systolic function post-stress. Septal dyskinesis. Post-stress ejection fraction is 39%. ECG: Resting ECG demonstrates normal sinus rhythm. ECG: Stress ECG was negative for ischemia. Stress Test: A pharmacological stress test was performed using lexiscan. Blood pressure demonstrated a normal response and heart rate demonstrated a normal response to stress. The patient's heart rate recovery was normal. The patient reported no symptoms during the stress test.    Signed by: Dolly Sanders MD on 10/27/2022  4:45 PM    02/28/22    CARDIAC PROCEDURE 02/28/2022 2/28/2022    Conclusion  Findings  1. Severe native multivessel coronary disease  2. Patent LIMA to LAD, vein graft to distal RCA with severe disease in the native vessels  3. Occluded vein graft to OM 2. Native OM 2 severely diseased along with proximal to mid circumflex as well as distal left main. Overall the vessel is significantly remodeled negatively. Additionally there is significant disease in the first marginal branch which is even smaller as well as AV groove branch right at the takeoff of OM2. Single stent 2.25 x 28 mm Xience was placed extending from the OM 2 into the circumflex into the distal left main and sequentially dilated with 2.25 noncompliant, 2 5 noncompliant, 3 oh noncompliant and 3 5 noncompliant to perform multilevel POT to manage multiple bifurcations on the way. Atherectomy was considered but given small size of the vessels, was not deemed to be absolutely safe for the obtuse marginal lesion. Overall stent expanded fairly well related to the size of the vessels. 4.  Normal LVEDP    Access right radial: Small vessel, tortuous} brachiocephalic  Contrast 65 cc    Recommendations  1. Plavix based dual antiplatelet therapy  2. Guideline directed medical therapy for secondary prevention of coronary events    Signed by:  Cameron Murray MD on 2/28/2022  4:22 PM        Most recent HS troponins:  No results for input(s): TROPHS in the last 72 hours. No lab exists for component:  CKMB      Review of Systems    [x]All other systems reviewed and all negative except as written in HPI    [] Patient unable to provide secondary to condition         Past Medical History:   Diagnosis Date    CAD (coronary artery disease) 11/10/2016    NSTEMI & 2 stents    Deafness 10/28/2012    DM (diabetes mellitus) ([facility] [address]     Elevated cholesterol     Hypertension     NSTEMI (non-ST elevated myocardial infarction) ([facility] [address] 11/10/2016     Past Surgical History:   Procedure Laterality Date    COLONOSCOPY N/A 6/28/2018    COLONOSCOPY performed by Jamie Jennings MD at [address]  11/11/2016    2 stents     Social Hx:  reports that he has never smoked. He has never been exposed to tobacco smoke. He has never used smokeless tobacco. He reports current alcohol use of about 2.0 standard drinks per week. He reports that he does not use drugs. Family Hx: family history includes Elevated Lipids in his brother, brother, and brother; Heart Attack in his father; Heart Disease in his father; Hypertension in his mother; No Known Problems in his daughter, sister, and son.   No Known Allergies       OBJECTIVE:  Wt Readings from Last 3 Encounters:   12/30/22 50.1 kg (110 lb 7.2 oz)   12/19/22 58.5 kg (129 lb)   12/16/22 57.4 kg (126 lb 8.7 oz)       Intake/Output Summary (Last 24 hours) at 12/30/2022 1516  Last data filed at 12/30/2022 1300  Gross per 24 hour   Intake 372.66 ml   Output 2135 ml   Net -1762.34 ml           Physical Exam    Vitals:   Vitals:    12/30/22 1415 12/30/22 1419 12/30/22 1426 12/30/22 1427   BP: (!) 114/57 (!) 100/50 (!) 112/49 (!) 99/50   Pulse: 97 95  98   Resp:       Temp:       SpO2:       Weight:       Height:         Telemetry: normal sinus rhythm    BP (!) 99/50 (BP 1 Location: Right upper arm, BP Patient Position: Sitting) Comment (BP Patient Position): after ambulation  Pulse 98   Temp 98.6 °F (37 °C)   Resp 24   Ht 5' 9\" (1.753 m)   Wt 50.1 kg (110 lb 7.2 oz)   SpO2 96%   BMI 16.31 kg/m²   General:    Alert, cooperative, no distress. Psychiatric:    Normal Mood and affect    Eye/ENT:      Pupils equal, No asymmetry, Conjunctival pink. Able to hear voice at normal amplitude   Lungs:      Visibly symmetric chest expansion, No palpable tenderness. clear   Heart[de-identified]    Regular rate and rhythm, S1, S2 normal, no murmur, click, rub or gallop. No JVD, Normal palpable peripheral pulses. No cyanosis   Abdomen:     Soft, non-tender. Bowel sounds normal. No masses,  No      organomegaly. Extremities:   Extremities normal, atraumatic, no edema. Neurologic:   CN II-XII grossly intact. No gross focal deficits           Data Review:     Radiology:   XR Results (most recent):  Results from Hospital Encounter encounter on 12/22/22    XR CHEST PORT    Narrative  EXAM: XR CHEST PORT    DATE: 12/24/2022 6:04 PM    INDICATION: CVC placement    COMPARISON: Chest radiograph December 24, 2022 at 1412 hours    FINDINGS: AP portable chest radiograph. There is a new RIGHT IJ central venous  catheter with the tip near the cavoatrial junction. Patient is status post  sternotomy and CABG. The heart is mildly enlarged but stable. The lungs are  hyperinflated. There are persistent, small bilateral pleural effusions. There is  no pneumothorax. A skin fold projects over the RIGHT upper lung. The vascular  clarity is again mildly diminished. Impression  1. RIGHT IJ catheter is in satisfactory position with the tip at the cavoatrial  junction. No pneumothorax. 2. No change in small bilateral effusions. CT Results (most recent):  Results from Hospital Encounter encounter on 12/22/22    CT ABD PELV WO CONT    Narrative  EXAM: CT ABD PELV WO CONT    INDICATION: rlq abdominal pain    COMPARISON: 5/3/2014    IV CONTRAST: None.     ORAL CONTRAST: None    TECHNIQUE:  Thin axial images were obtained through the abdomen and pelvis. Coronal and  sagittal reformats were generated. CT dose reduction was achieved through use of  a standardized protocol tailored for this examination and automatic exposure  control for dose modulation. The absence of intravenous contrast material reduces the sensitivity for  evaluation of the vasculature and solid organs. FINDINGS:  LOWER THORAX: Small bilateral pleural effusions. Partial collapse bases  LIVER: No mass. BILIARY TREE: Gallstones. No evidence of acute cholecystitis CBD is not dilated. SPLEEN: within normal limits. PANCREAS: No focal abnormality. ADRENALS: 19 mm right adrenal nodule unchanged,  KIDNEYS/URETERS: Mild bilateral hydronephrosis. No stone  STOMACH: Unremarkable. SMALL BOWEL: No dilatation or wall thickening. COLON: No dilatation or wall thickening. APPENDIX: Surgically absent  PERITONEUM: No ascites or pneumoperitoneum. RETROPERITONEUM: No lymphadenopathy or aortic aneurysm. Extensive vascular  calcifications  REPRODUCTIVE ORGANS: Mildly enlarged  URINARY BLADDER: Massive distention  BONES: No destructive bone lesion. ABDOMINAL WALL: Small umbilical hernia containing fat. ADDITIONAL COMMENTS: N/A    Impression  1. Bladder is massively distended with mild bilateral hydronephrosis. May  indicate bladder outlet obstruction. Prostate is mildly enlarged  2. Gallstones. No acute cholecystitis  3. Small bilateral pleural effusions    MRI Results (most recent):  Results from Hospital Encounter encounter on 05/22/16    MRI LUMB SPINE WO CONT    Narrative  **Final Report**      ICD Codes / Adm. Diagnosis: 724.2  M54.5 / Lumbago  Low back pain  Examination:  MR Keely Dias CON  - [id number] - May 22 2016  1:45PM  Accession No:  [id number]  Reason:  Low back pain      REPORT:  Indication: Pain and back extends into right leg to foot    Exam: MRI of the lumbar spine. Sequences include sagittal and axial T1 and  T2-weighted images.  Sagittal STIR.    Comparisons: April 27, 2016    Contrast: None    Findings: Alignment is normal. There is no evidence of marrow replacement or  acute fracture. Cord terminus is within normal limits. Paraspinous soft  tissues are within normal limits. T12-L1: No stenosis    L1-L2: There is desiccation of this disc with a small bulge but no stenosis    L2-L3: There is desiccation of this disc with a small bulge but no stenosis    L3-L4: There is mild left facet arthropathy but no stenosis    L4-L5: There is disc height loss with a small disc bulge. Facet arthropathy. No stenosis    L5-S1: There is disc height loss with a small disc bulge and left  paracentral disc extrusion extending inferiorly. This mildly distorts the  left S1 nerve root in the subarticular zone and left lateral recess. There  are facet degenerative changes with moderate left and mild-to-moderate right  foraminal narrowing    Impression  :  1. Multilevel degenerative change detailed by level above most significant  at L5-S1                Signing/Reading Doctor: Rebeca Connor ([id number])  Approved: Rebeca Connor ([id number])  May 23 2016  8:39AM        No results for input(s): CPK, TROIQ in the last 72 hours. No lab exists for component: CKQMB, CPKMB, BMPP  Recent Labs     12/30/22 0314 12/29/22  1629 12/29/22  0411   * 134* 135*   K 3.4* 3.6 3.4*   CL 95* 92* 93*   CO2 27 30 28   BUN 94* 96* 87*   CREA 2.47* 2.66* 2.65*   * 124* 126*   PHOS 5.4*  --  5.4*   CA 10.5* 10.7* 10.9*     Recent Labs     12/30/22 0314 12/29/22  0411   WBC 11.0 10.3   HGB 9.2* 9.6*   HCT 30.7* 32.1*    337     Recent Labs     12/30/22 0314 12/29/22  1629   * 162*       No results for input(s): CHOL, LDLC in the last 72 hours. No lab exists for component: TGL, HDLC,  HBA1C  No results for input(s): CRP, TSH, TSHEXT, TSHEXT in the last 72 hours.     No lab exists for component: ESR          Current meds:    Current Facility-Administered Medications:     potassium chloride SR (KLOR-CON 10) tablet 40 mEq, 40 mEq, Oral, BID, Sherie MOLINA MD, 40 mEq at 12/30/22 0825    insulin glargine (LANTUS) injection 40 Units, 40 Units, SubCUTAneous, DAILY, Ramana Jerry, DO, 40 Units at 12/30/22 0825    pantoprazole (PROTONIX) tablet 40 mg, 40 mg, Oral, ACB, Ramana Jerry, DO, 40 mg at 12/30/22 0825    diphenhydrAMINE (BENADRYL) capsule 50 mg, 50 mg, Oral, QHS, Clarissa Miller MD, 50 mg at 12/29/22 2134    hydrocortisone Sod Succ (PF) (SOLU-CORTEF) injection 50 mg, 50 mg, IntraVENous, Q12H, Colleen Astorga NP, 50 mg at 12/30/22 0826    heparin (porcine) injection 5,000 Units, 5,000 Units, SubCUTAneous, Q12H, Ramana Jerry, DO, 5,000 Units at 12/30/22 0825    QUEtiapine (SEROquel) tablet 50 mg, 50 mg, Oral, QHS, David Aguilar G, DO, 50 mg at 12/29/22 2134    lidocaine 4 % patch 1 Patch, 1 Patch, TransDERmal, Q24H, Walker Aponte MD, 1 Patch at 12/28/22 1706    bumetanide (BUMEX) 0.25 mg/mL infusion, 0.5 mg/hr, IntraVENous, TITRATE, Abou-Assi, Marcy Aly MD, Last Rate: 2 mL/hr at 12/30/22 0637, 0.5 mg/hr at 12/30/22 5230    albuterol-ipratropium (DUO-NEB) 2.5 MG-0.5 MG/3 ML, 3 mL, Nebulization, Q6H RT, Jose Layne, Walker MOLINA MD, 3 mL at 12/30/22 0757    DOBUTamine (DOBUTREX) 500 mg/250 mL (2,000 mcg/mL) infusion, 3 mcg/kg/min, IntraVENous, CONTINUOUS, Susanna Primrose, MD, Last Rate: 5.1 mL/hr at 12/30/22 9828, 3 mcg/kg/min at 12/30/22 0202    balsam peru-castor oiL (VENELEX) ointment, , Topical, BID, Clarissa Miller MD, Given at 12/30/22 2877    NOREPINephrine (LEVOPHED) 8 mg in 5% dextrose 250mL (32 mcg/mL) infusion, 0.5-30 mcg/min, IntraVENous, TITRATE, Sherie MOLINA MD, Stopped at 12/24/22 2342    alteplase (CATHFLO) 1 mg in sterile water (preservative free) 1 mL injection, 1 mg, InterCATHeter, PRN, Riaz Ken MD    bacitracin 500 unit/gram packet 1 Packet, 1 Packet, Topical, PRN, Sherie MOLINA, MD    glucose chewable tablet 16 g, 4 Tablet, Oral, PRN, Yamilet MOLINA MD    glucagon (GLUCAGEN) injection 1 mg, 1 mg, IntraMUSCular, PRN, Mynor Green MD    dextrose 10 % infusion 0-250 mL, 0-250 mL, IntraVENous, PRN, Mynor Green MD    insulin lispro (HUMALOG) injection, , SubCUTAneous, AC&HS, Jenn Buckhorn, DO, 15 Units at 12/30/22 1227    insulin lispro (HUMALOG) injection 3 Units, 0.05 Units/kg, SubCUTAneous, TID WITH MEALS, Yamilet MOLINA MD, 3 Units at 12/29/22 1856    senna-docusate (Arlina Aspen) 8.6-50 mg per tablet 1 Tablet, 1 Tablet, Oral, BID PRN, Mynor Green MD, 1 Tablet at 12/26/22 2595    bisacodyL (DULCOLAX) suppository 10 mg, 10 mg, Rectal, QHS PRN, Yamilet MOLINA MD    sodium chloride (NS) flush 5-40 mL, 5-40 mL, IntraVENous, Q8H, Walker Aponte MD, 10 mL at 12/30/22 1190    sodium chloride (NS) flush 5-40 mL, 5-40 mL, IntraVENous, PRN, Mynor Green MD, 10 mL at 12/28/22 0845    acetaminophen (TYLENOL) tablet 650 mg, 650 mg, Oral, Q6H PRN, 650 mg at 12/26/22 1714 **OR** acetaminophen (TYLENOL) suppository 650 mg, 650 mg, Rectal, Q6H PRN, Walker Moreno MD    polyethylene glycol (MIRALAX) packet 17 g, 17 g, Oral, DAILY PRN, Yamilet MOLINA MD, 17 g at 12/26/22 0649    ondansetron (ZOFRAN ODT) tablet 4 mg, 4 mg, Oral, Q8H PRN **OR** ondansetron (ZOFRAN) injection 4 mg, 4 mg, IntraVENous, Q6H PRN, Yamilet MOLINA MD, 4 mg at 12/24/22 0849    aspirin delayed-release tablet 81 mg, 81 mg, Oral, DAILY, Ranjit Pearson MD, 81 mg at 12/30/22 0825    ipratropium (ATROVENT) 21 mcg (0.03 %) nasal spray 2 Spray, 2 Spray, Both Nostrils, Q12H, Nya Pearson MD, 2 [place] at 12/30/22 0826    tamsulosin (FLOMAX) capsule 0.4 mg, 0.4 mg, Oral, DAILY, Ranjit Pearson MD, 0.4 mg at 12/30/22 0825    sodium chloride (NS) flush 5-40 mL, 5-40 mL, IntraVENous, PRN, MD Kimmie Garay MD  [facility] [address] [facility] [address]  [place]  [phone]      Calderon Steven MD

## 2022-12-30 NOTE — DIABETES MGMT
[facility]  DIABETES MANAGEMENT CONSULT    Consulted by  Francesca White MD   for advanced nursing evaluation and care for inpatient blood glucose management. Evaluation and Action Plan   Moses Benson is a 71year old gentleman, with Type 2 Diabetes with a recent A1C of 6.5%, who was admitted with acute hypoxic respiratory s/t acute on chronic HFrEF and TANNER. He was admitted 2 weeks ago for similar complaint and antihyperglycemic agents were adjusted on discharge. Metformin was stopped due to decline in GFR and Jardiance switched to [place] (unclear why). He was compliant with his Em [place], [place] and Glipizide up until day prior to this hospitalization. His glucose was significantly elevated at 458 on admission, s/t insulin resistance from acute HFrEF, elevated lactate and impaired perfusion. GFR is 27 and would avoid therefore avoid GUILLORY. Please start basal/bolus insulin therapy at this time and target an inpatient BG goal of 140-180mg/dl. BG trends since admission have fluctuated and required reducing basal insulin due to lower normal BG in 80s. Noted prandial hyperglycemia in past 24h-Fasting ; Would adjust basal back to 12 units daily-PO intake poor today per notes-Basal insulin adjusted to 20 units daily on 12/28/22. Marked hyperglycemia noted with initation of hydrocortisone on 12/28/22 that required mgmt by insulin infusion evening 12/28/22. Noted Lantus given this AM and insulin infusion is still going. Reached out to intensivist to stop insulin infusion as BG trends consistently dropping for past 3h. Transitioned off insulin infusion 12/29/22. On moderate dosing of basal insulin with continued hyperglycemia noted. Fasting . Prandial hyperglycemia noted, however low PO intake - Renal status remains low, eGFR 27.     Management Rationale Action Plan   Medication   Basal needs-Insulin infusion 12/29/22-?stopping today To cover for diabetes: 0.3 u/kg dosing=15 units basal    To cover for steroid: cover with 0.15/kg dosing and add to current basal dose. =16 units additional basal to cover for both steroid doses  =  CONTINUE Lantus 40 units daily, if BG remains >200, advance dose daily by 20%. Nutritional needs Using low sensitivity based on low BMI CONTINUE  low dose humalog 0.05 units/kg/meal  3 units Humalog/meal    Hold if patient is NPO or consumes less than 50% of carbohydrates on meal tray    Advance by 2 units daily for persistent pre-prandial hyperglycemia     Corrective insulin Using normal sensitivity based on  Normal Sensitivity ACHS   Additional Recommendations. POC glucose ACHS    Consistent carbohydrate diet (60 grams CHO/meal). Patient interested in Glucerna    3. Please hold oral antihyperglycemic agents in the inpatient setting,              Initial Presentation   Mary Beth Hilliard is a 71 y.o. male who presented to the ED 12/22/22 with a 3-4 day c/o gradually worsening dyspnea, fatigue, chills, constipation and RLQ pain. He endorses difficulty voiding despite compliance with lasix. In the ED, he was hypoxic and started on supplemental O2 and diuresis. LAB: WBC 25.1, , GFR 25, Lac 5.2, Trop 1096, BNP 6905  CXR: Widespread interstitial opacities bilateral mid to lower lung opacities  concerning for edema and/or atypical infection. Small bilateral pleural  effusions.   CT: CT revealed distended bladder  EKG: Sinus tachycardia   Biatrial enlargement   Rightward axis   Marked ST abnormality, possible inferior subendocardial injury   Marked ST abnormality, possible lateral subendocardial injury   Abnormal ECG   Did have a hospital admission for HFrEF 12/11/22-12/16/22    HX:   Past Medical History:   Diagnosis Date    CAD (coronary artery disease) 11/10/2016    NSTEMI & 2 stents    Deafness 10/28/2012    DM (diabetes mellitus) ([facility] [address]     Elevated cholesterol     Hypertension     NSTEMI (non-ST elevated myocardial infarction) ([facility] 11/10/2016    CKD  CAD with CABG 2018    INITIAL DX:   Sepsis ([facility] [A41.9]     Current Treatment     TX: IVF, Diuresis, Supplemental O2    Hospital Course   Clinical progress has been complicated by:   50/78: Initial admission with hospitalist service but with progressive hypoxia early requiring CCU transfer. Elevated troponin/BNP. BPH and hydronephrosis. Pt pan cultured, given dose of abx and dose of IVP lasix. Pt placed on BiPAP w/ improvement in oxygenation. 12/27: cardiology suggesting myocarditis as reason for cardiomyopathy systolic HF>   83/91: steroid regimen started. 12/30: remains on dobutamine   Diabetes History   Type 2 Diabetes  Ambulatory BG management provided by: PCP Lucretia Buckner MD    Diabetes-related Medical History  Acute complications  Acute hyperglycemia  Neurological complications  Peripheral neuropathy  Microvascular disease  Nephropathy  Macrovascular disease  CAD  Other associated conditions     CHF    Diabetes Medication History  Key Antihyperglycemic Medications               dapagliflozin (FARXIGA) 10 mg tab tablet (Taking) Take 1 Tablet by mouth daily.     glipiZIDE (GLUCOTROL) 5 mg tablet (Taking) Take 1 tablet by mouth twice daily    Januvia 50 mg tablet (Taking) Take 1 tablet by mouth once daily             Diabetes self-management practices:   Eating pattern   Eats 3 small meals daily  [x] Breakfast  2 Eggs, Coffee  [x] Lunch   [place]  [x] Dinner   \"Sudanese Food\" Bread, rice, lentils   [x] Bedtime  [place]  [x] Snacks   Afternoon snack  [x] Beverages  Water, Coffee  Physical activity pattern   Sedentary   Monitoring pattern  Does not check BG  Taking medications pattern  [x] Consistent administration  [x] Affordable  Social determinants of health impacting diabetes self-management practices   Concerned that you need to know more about how to stay healthy with diabetes  Overall evaluation:    [x] Achieving A1c target with drug therapy & self-care practices    Subjective   I took my medicine yesterday.      Objective   Physical exam  General Underweight male in mild distress/ill-appearing. Conversant and cooperative  Neuro  Alert, oriented   Vital Signs Visit Vitals  BP (!) 109/54   Pulse 95   Temp 99.1 °F (37.3 °C)   Resp 20   Ht 5' 9\" (1.753 m)   Wt 50.1 kg (110 lb 7.2 oz)   SpO2 98%   BMI 16.31 kg/m²     Skin  Warm and dry. Acanthosis noted along neckline. No lipohypertrophy or lipoatrophy noted at injection sites   Heart   Regular rate and rhythm. No murmurs, rubs or gallops  Lungs  Clear to auscultation without rales or rhonchi  Extremities No foot wounds        Laboratory  Recent Labs     22  0314 22  1629 22  0411 22  1558 22  0354   * 124* 126*   < > 196*   AGAP 12 12 14   < > 13   WBC 11.0  --  10.3  --  9.3   CREA 2.47* 2.66* 2.65*   < > 2.76*   AST 42* 55* 81*   < > 258*   * 300* 377*   < > 568*    < > = values in this interval not displayed. Factors impacting BG management  Factor Dose Comments   Nutrition:  Standard meals     60 grams/meal      Drugs:  Vasopressor load   Levophed  Affects insulin delivery     Heart Failure EF 20-30%         Other:   Kidney function GFR 27      Blood glucose pattern    Significant diabetes-related events over the past 24-72 hours  A1C  6.5% 22 (down from 7.0 10/12/22)  Fasting B  Pre-prandial: >200  Basal: Lantus 8units--> adjusted to 20 units on 22  Bolus: 3 units TID  Correction: normal sensitivity    Last admission was on Jardiance 10, alogliptin 12.5, 12 units Lantus, correctional humalog- experienced pre-prandial hyperglycemia.       Assessment and Nursing Intervention   Nursing Diagnosis Risk for unstable blood glucose pattern   Nursing Intervention Domain 8079 Decision-making Support   Nursing Interventions Examined current inpatient diabetes/blood glucose control   Explored factors facilitating and impeding inpatient management  Explored corrective strategies with patient and responsible inpatient provider   Informed patient of rational for insulin strategy while hospitalized     Nursing Diagnosis 94259 Ineffective Health Management   Nursing Intervention Domain 5254 Decision-making Support   Nursing Interventions Identified diabetes self-management practices impeding diabetes control  Discussed diabetes survival skills related to  Healthy Plate eating plan; given handouts  Role of physical activity in improving insulin sensitivity and action  Procedure for blood glucose monitoring & options for low-cost products  Medications plan at discharge     Billing Code(s)   [x] [phone]    Before making these care recommendations, I personally reviewed the hospitalization record, including notes, laboratory & diagnostic data and current medications, and examined the patient at the bedside (circumstances permitting) before making care recommendations. More than fifty (50) percent of the time was spent in patient counseling and/or care coordination.   Total minutes: 7400 BJORN Law, CNS  Diabetes Clinical Nurse Specialist  [facility] Diabetes [facility]  Access via [employer]

## 2022-12-30 NOTE — PROGRESS NOTES
SARA: Anticipate discharge to SNF pending medical progress. Transportation likely BLS. RUR: 26%     Disposition: Patient re-admitted 12/22 for sepsis. This is patient's third admission at [facility] this month. Cardiology and nephrology are following. Patient with a history of cardiomyopathy, CAD s/p stenting, NSTEMI, diabetes II, HTN, CKD III. Currently on dobutamine drip which will affect discharge placement. CM reached out to [facility] inquiring as to plan for inotrope drip at discharge in order to proceed with SNF referrals. CM did speak with patient and son/DIL. Placement preferences are 1) [facility] [place] ([phone], 2) [facility] [place]  and 3)[facility]. CM also scheduled critical care transport with [facility] [address] [phone] for cardiac MRI scheduled at [facility] for 1/3/23 at 1330. Staff will need to follow up with critical care transport on Tuesday morning to ensure transport is still available. Transition of Care Plan:     The Plan for Transition of Care is related to the following treatment goals: SNF    The Patient and/or patient representative son, Mavis Archibald ([phone]), was provided with a choice of provider and agrees  with the discharge plan. Yes [x] No []    A Freedom of choice list was provided with basic dialogue that supports the patient's individualized plan of care/goals and shares the quality data associated with the providers.        Yes [x] No []     Bean Mcgraw, [facility],[address]  [phone]

## 2022-12-30 NOTE — PROGRESS NOTES
[facility] Adult  Hospitalist Group     ICU Transfer/Accept Summary     This patient is being transferred [place] [address] ICU  DATE OF TRANSFER: 12/30/2022       PATIENT ID: Destiny Hernandez  MRN: [id number]   YOB: 1950    PRIMARY CARE PROVIDER: Mason Palomino MD   DATE OF ADMISSION: 12/22/2022 11:23 AM    ATTENDING PHYSICIAN: Miky Sahni MD  CONSULTATIONS:   IP CONSULT TO HOSPITALIST  IP CONSULT TO UROLOGY  IP CONSULT TO ADVANCED HEART FAILURE  IP CONSULT TO ADVANCED HEART FAILURE  IP CONSULT TO NEPHROLOGY  IP CONSULT TO NEPHROLOGY  IP CONSULT TO CARDIOLOGY    PROCEDURES/SURGERIES:   * No surgery found *    REASON FOR ADMISSION: <principal problem not specified>     HOSPITAL PROBLEM LIST:  Patient Active Problem List   Diagnosis Code    Cramp of limb R25.2    Deafness H91.90    Type 2 diabetes mellitus with hyperglycemia ([facility]) E11.65    Nonrheumatic aortic valve stenosis I35.0    Bruit of right carotid artery R09.89    Colon cancer screening Z12.11    Coronary artery disease involving native coronary artery of native heart without angina pectoris I25.10    Dyslipidemia, goal LDL below 70 E78.5    Hypertension, essential I10    S/P CABG x 3 Z95.1    CKD stage 3 due to type 2 diabetes mellitus ([facility]) E11.22, N18.30    NSTEMI (non-ST elevated myocardial infarction) ([facility]) I21.4    CHF (congestive heart failure) ([facility]) I50.9    Sepsis ([address] X66.3    Systolic CHF, acute on chronic ([facility]) I50.23    Receiving inotropic medication Z79.899         Brief HPI and Hospital Course:      67-year-old male with a past medical history of coronary artery disease, diabetes, acute on chronic systolic heart failure who presented to the emergency department with progressive dyspnea. Patient was found to have an acute CHF exacerbation complicated by hypotension. Presumably due to cardiogenic shock. Patient was started on dobutamine, Bumex, Levophed for blood pressure management.   Cardiology was consulted. Patient now improved, on room air and able to ambulate today. Stable for transfer out of the ICU. Family concerned about decreased oral intake. Assessment and Plan:    Acute on chronic systolic heart failure exacerbation-EF 25%  Concern for myocarditis  Cardiogenic shock-ongoing  Lactic acidosis  Coronary direct artery disease CAD status post CABG in 2018, PCI  -Cardiology and advanced heart failure following, per their notation suggestion of alternative mechanism to ischemia. 3  -Continue IV Bumex, IV dobutamine  -Patient okay to transfer to stepdown unit  -Viral and immune panel ordered through advanced heart failure services  -Continue hydrocortisone, for presumed myocarditis. Will need to discuss with cardiology regarding taper    Acute kidney injury on CKD stage III-improving  -Continue Bumex gtt. -Nephrology following  -I's and O's    Type 2 diabetes -A1c 6.5%  -Continue 40 of Lantus, sliding scale insulin    Insomnia, hospital delirium-continue Seroquel at bedtime, would consider DC Benadryl given his age will discuss with patient tomorrow. Hypokalemia-continue potassium supplementation, check daily  BPH-continue tamsulosin  PAD, status post right SFA PTCA, follow-up with Dr. Ramez Pearson in 2 to 3 months      DVT PPX - heparin   DNR   DC > 2 days, will need assistance from cardiology        PHYSICAL EXAMINATION:  Visit Vitals  BP (!) 109/54   Pulse 95   Temp 99.1 °F (37.3 °C)   Resp 20   Ht 5' 9\" (1.753 m)   Wt 50.1 kg (110 lb 7.2 oz)   SpO2 98%   BMI 16.31 kg/m²       General:          Alert, cooperative, no distress, chronically ill appearing   HEENT:           Atraumatic, MMM            Neck:               Supple, symmetrical,  thyroid: non tender  Lungs:             Minimal crackles to auscultation bilaterally. No Wheezing or Rhonchi. No rales. Heart:              Regular  rhythm,  No  murmur   No edema  Abdomen:       Soft, non-tender. Not distended.   Bowel sounds normal  Extremities: No cyanosis. No clubbing,  +2 distal pulses  Skin:                Not pale. Not Jaundiced  No rashes   Psych:             Not anxious or agitated. Neurologic:      Alert, moves all extremities, oriented X 3. Labs:     Recent Labs     12/30/22 0314 12/29/22 0411   WBC 11.0 10.3   HGB 9.2* 9.6*   HCT 30.7* 32.1*    337     Recent Labs     12/30/22 0314 12/29/22  1629 12/29/22 0411 12/28/22  1558 12/28/22  0354   * 134* 135*   < > 133*   K 3.4* 3.6 3.4*   < > 3.3*   CL 95* 92* 93*   < > 92*   CO2 27 30 28   < > 28   BUN 94* 96* 87*   < > 75*   CREA 2.47* 2.66* 2.65*   < > 2.76*   * 124* 126*   < > 196*   CA 10.5* 10.7* 10.9*   < > 10.5*   MG 2.8*  --  2.9*  --  2.7*   PHOS 5.4*  --  5.4*  --  6.5*    < > = values in this interval not displayed. Recent Labs     12/30/22 0314 12/29/22 1629 12/29/22 0411   * 300* 377*   * 162* 177*   TBILI 0.7 0.7 0.6   TP 7.9 7.7 8.0   ALB 4.4 4.4 4.6   GLOB 3.5 3.3 3.4     No results for input(s): INR, PTP, APTT, INREXT in the last 72 hours. No results for input(s): FE, TIBC, PSAT, FERR in the last 72 hours. Lab Results   Component Value Date/Time    Folate 10.5 07/03/2018 09:24 AM      No results for input(s): PH, PCO2, PO2 in the last 72 hours. No results for input(s): CPK, CKNDX, TROIQ in the last 72 hours.     No lab exists for component: CPKMB  Lab Results   Component Value Date/Time    Cholesterol, total 143 10/12/2022 09:10 AM    HDL Cholesterol 49 10/12/2022 09:10 AM    LDL, calculated 41.4 10/12/2022 09:10 AM    Triglyceride 263 (H) 10/12/2022 09:10 AM    CHOL/HDL Ratio 2.9 10/12/2022 09:10 AM     Lab Results   Component Value Date/Time    Glucose (POC) 355 (H) 12/30/2022 11:55 AM    Glucose (POC) 147 (H) 12/30/2022 08:14 AM    Glucose (POC) 242 (H) 12/29/2022 09:29 PM    Glucose (POC) 104 12/29/2022 05:21 PM    Glucose (POC) 116 12/29/2022 04:06 PM     Lab Results   Component Value Date/Time    Color YELLOW/STRAW 12/24/2022 03:32 PM    Appearance CLOUDY (A) 12/24/2022 03:32 PM    Specific gravity 1.016 12/24/2022 03:32 PM    Specific gravity 1.020 05/03/2014 08:18 AM    pH (UA) 5.0 12/24/2022 03:32 PM    Protein 100 (A) 12/24/2022 03:32 PM    Glucose 500 (A) 12/24/2022 03:32 PM    Ketone Negative 12/24/2022 03:32 PM    Bilirubin Negative 12/24/2022 03:32 PM    Urobilinogen 1.0 12/24/2022 03:32 PM    Nitrites Negative 12/24/2022 03:32 PM    Leukocyte Esterase MODERATE (A) 12/24/2022 03:32 PM    Epithelial cells FEW 12/24/2022 03:32 PM    Bacteria 2+ (A) 12/24/2022 03:32 PM    WBC 5-10 12/24/2022 03:32 PM    RBC 5-10 12/24/2022 03:32 PM         CODE STATUS:   Full Code   X DNR    Partial    Comfort Care       Signed:   Vivi Ortiz MD  Date of Service:  12/30/2022  4:23 PM

## 2022-12-30 NOTE — PROGRESS NOTES
0730 Shift report received from  GPal    0800 Pt alert and awake. Repositioned. 1100 Family at bedside, page out to case management for discharge planning    1215 BG Keyla MD made aware. To administer 15 units lispro    1240 Per [facility] at [place], patient needs to arrive to [address] no later than 1330 on Tuesday, 1/3/23. [facility] [place] staff to coordinate transportation needs. 1300 Pt advocacy called re: missing R hearing aid from previous admission. Contact information passed on.     1430 Walking with PT. Up to chair.     1600 Pt back to bed    1930 Shift report given to  GPal

## 2022-12-30 NOTE — PROGRESS NOTES
[facility] in [place], [place]  Inpatient Progress Note      Patient name: Harsha Cedeno  Patient : 1950  Patient MRN: [id number]  Consulting MD: Martinez Olmos DO  Date of service: 22    REASON FOR REFERRAL:  Management of acute on chronic systolic heart failure    Plan of Care:  71 y.o. male with hx ICM, CAD s/p CABG, admitted for Acute on Chronic HFrEF. Likely causes of worsening HF are untreated TRISTAN, amyloidosis, progression of ICM or myocarditis. Initial HF evaluation including PYP when patient is able to lay flat. Will continue inotropic support for cardiogenic shock  Feel risk outweighs benefit for biopsy at this time. Trial steroids. Consider cardiac MRI next week. Will see as needed over weekend     INTERVAL HISTORY:  -VSS  -creat slight decrease at 2.47 ; K 3.4; LFTs improving;  -weight down 3#; I/O neg 1.7L  -Mr. Kendall is alert and feeling okay. Still without an appetite. Denies pain or SOB. No swelling. Was dizzy yesterday with PT, but has not been dizzy since. RECOMMENDATIONS:  Continue current medical therapy for heart failure  Hold GDMT due to cardiogenic shock  Continue Dobutamine gtt at 5mcg/kg/min- watch for increased ectopy. Appears was decreased to 3mcg/kg/min yesterday - so far appears to be tolerating   Bumex drip decreased yesterday. Feel it can either be decreased further to 0.25mg/hr or changed to pulse dosing. Will defer to nephrology.    uric acid level 6.8  No indication for systemic anticoagulation  ASA restarted  No statin due to transaminitis- improving   Keep K > 4 and Mag > 2  Transfuse to keep Hgb > 7  Pulmonary hygiene, wean O2 as able   Labs: CBC, BMP, LFT, pro-NT-BNP  Venofer x 2  Will need OP Sleep Study  Genetic testing collected   Appreciate nephrology and cardiology recommendations   Consider cMRI when able   PYP pending   Would hold off on biopsy at this time, as risk likely outweighs dimitrisift  Start trial of steroids to see if it improves clinical picture; lower infection risk discussed with intensivisit. Solucortef 1mg/kg every 12 hours   Nutritionist consulted   PT/OT  Heart failure education  Patient is currently a DNR, if changes to Full Code will need to consider lifevest at discharge   Plan at discharge: recommend flu and pneumonia vaccinations  Okay to transfer out of CCU  All other care per primary team    IMPRESSION:    Cardiogenic shock- improving slightly on inotropic support  Acute on chronic combined systolic/diastolic  heart failure  Stage D, NYHA class IV symptoms  Likely combination of ICM/NICM cardiomyopathy, LVEF 20%  Transaminitis improving   Acute on CKD, baseline creatinine 1.5-1.7.  levels slowly improving    Volume overload improving   CAD s/p CABG x 2: further disease best managed medically due to small vessel size   Mild AS  PAD  Bilateral hydronephrosis s/p donnelly  DM2  Fatigue, severe. Poor appetite       LIFE GOALS:  Lifestyle goals reviewed with the patient. Patient's personal goals include: TBD  Important upcoming milestones or family events: TBD  The patient identifies the following as important for living well: TBD      HPI:  71 y.o. male who was admitted via ED for worsening SOB, poor appetite, orthopnea and fatigue. Pmhx of CAD s/p CABG, PAD, DM 2, recent admission for HFmrEF earlier this month. Serial TTEs show progressive decline in EF since 3/2021 with the most recent EF at 25-30%. Recent LHC shows diffuse CAD and no interventions indicated. Patient had Malathi Hui recently and there is some thought to myocarditis or other etiology causing worsening HF. [facility] was consulted for further evaluation and management of HFrEF. CARDIAC IMAGING:  Echo 12/26/22    Left Ventricle: Severely reduced left ventricular systolic function with a visually estimated EF of 25 - 30%. Left ventricle size is normal. Normal wall thickness.  There are regional wall motion abnormalities. Grade II diastolic dysfunction with increased LAP. Right Ventricle: Moderately reduced systolic function. TAPSE is abnormal. TAPSE is 1.1 cm. Aortic Valve: Mild stenosis of the aortic valve. AV peak gradient is 13 mmHg. AV peak velocity is 1.8 m/s. Mitral Valve: Not well visualized. Moderate annular calcification at the posterior leaflet of the mitral valve. Mild to moderate regurgitation. Tricuspid Valve: Mildly elevated RVSP. Left Atrium: Left atrium is moderately dilated. 12/8/22    Left Ventricle: Moderately reduced left ventricular systolic function with a visually estimated EF of 35 - 40%. Severe hypokinesis of the following segments: mid anteroseptal, apical anterior, apical septal, apical inferior and apical lateral. Severe hypokinesis of the apex. Mitral Valve: Severely thickened leaflet, at the anterior and posterior leaflets. Severely calcified leaflet, at the anterior and posterior leaflets. Mild annular calcification of the mitral valve. Moderate regurgitation. Left Atrium: Left atrium is mildly dilated. Contrast used: Definity. limited study    EKG 12/22/22 ST, Biatria enlargement, marked ST abnormality    [facility] 12/6/22  1. Normal LVEDP  2. Severe native multivessel coronary artery disease  3. Patent LIMA to LAD and vein graft to distal RCA  4. Recurrent ISR in OM1 stent with now 60 to 70% restenosis  5. Recoil of left main and circumflex stent with now recurrent 40 to 50% stenosis. 6.  Progression of ostial left main disease now to about 60% stenosis  7. Progression of disease in jailed first marginal branch now with diffuse 90% stenosis  8.   High-grade stenosis in the mid to distal right potential femoral artery treated with 6 x 40 mm impact drug-coated balloon angioplasty to reduce the stenosis to less than 40%    NST      HEMODYNAMICS:  RHC not done  CPEST too ill   6MW too ill    OTHER IMAGING:  CXR   XR Results (most recent):  Results from Hospital Encounter encounter on 12/22/22    XR CHEST PORT    Narrative  EXAM: XR CHEST PORT    DATE: 12/24/2022 6:04 PM    INDICATION: CVC placement    COMPARISON: Chest radiograph December 24, 2022 at 1412 hours    FINDINGS: AP portable chest radiograph. There is a new RIGHT IJ central venous  catheter with the tip near the cavoatrial junction. Patient is status post  sternotomy and CABG. The heart is mildly enlarged but stable. The lungs are  hyperinflated. There are persistent, small bilateral pleural effusions. There is  no pneumothorax. A skin fold projects over the RIGHT upper lung. The vascular  clarity is again mildly diminished. Impression  1. RIGHT IJ catheter is in satisfactory position with the tip at the cavoatrial  junction. No pneumothorax. 2. No change in small bilateral effusions. CT Results (most recent):  Results from Hospital Encounter encounter on 12/22/22    CT ABD PELV WO CONT    Narrative  EXAM: CT ABD PELV WO CONT    INDICATION: rlq abdominal pain    COMPARISON: 5/3/2014    IV CONTRAST: None. ORAL CONTRAST: None    TECHNIQUE:  Thin axial images were obtained through the abdomen and pelvis. Coronal and  sagittal reformats were generated. CT dose reduction was achieved through use of  a standardized protocol tailored for this examination and automatic exposure  control for dose modulation. The absence of intravenous contrast material reduces the sensitivity for  evaluation of the vasculature and solid organs. FINDINGS:  LOWER THORAX: Small bilateral pleural effusions. Partial collapse bases  LIVER: No mass. BILIARY TREE: Gallstones. No evidence of acute cholecystitis CBD is not dilated. SPLEEN: within normal limits. PANCREAS: No focal abnormality. ADRENALS: 19 mm right adrenal nodule unchanged,  KIDNEYS/URETERS: Mild bilateral hydronephrosis. No stone  STOMACH: Unremarkable. SMALL BOWEL: No dilatation or wall thickening.   COLON: No dilatation or wall thickening. APPENDIX: Surgically absent  PERITONEUM: No ascites or pneumoperitoneum. RETROPERITONEUM: No lymphadenopathy or aortic aneurysm. Extensive vascular  calcifications  REPRODUCTIVE ORGANS: Mildly enlarged  URINARY BLADDER: Massive distention  BONES: No destructive bone lesion. ABDOMINAL WALL: Small umbilical hernia containing fat. ADDITIONAL COMMENTS: N/A    Impression  1. Bladder is massively distended with mild bilateral hydronephrosis. May  indicate bladder outlet obstruction. Prostate is mildly enlarged  2. Gallstones. No acute cholecystitis  3.  Small bilateral pleural effusions      PHYSICAL EXAM:  Visit Vitals  /66   Pulse 91   Temp 97.2 °F (36.2 °C)   Resp 16   Ht 5' 9\" (1.753 m)   Wt 110 lb 7.2 oz (50.1 kg)   SpO2 98%   BMI 16.31 kg/m²     Physical Assessment:   General Appearance: alert, ill-appearing elderly male resting in bed in NAD; appears older than documented age; [place]  Eyes: sclera anicteric  Mouth/Throat: dry mucous membranes; oral pharynx clear  Neck: supple;  Pulmonary:  dim to auscultation bilaterally; poor effort;   Cardiovascular: regular rate and rhythm; no murmur, click, rub, or gallop  Abdomen: soft, non-tender, non-distended; bowel sounds normal  Musculoskeletal: no swelling or deformity; moves all extremities  Extremities: no edema; palpable distal pulses   Skin: warm and dry  Neuro: grossly normal   Psych: appropriate to situation        REVIEW OF SYSTEMS:  Review of Symptoms:  Constitutional: fatigue, weak  Eyes: negative  Ears, nose, mouth, throat, and face: [place]  Respiratory: negative  Cardiovascular: negative  Gastrointestinal: poor appetite    Genitourinary:negative  Musculoskeletal:negative  Neurological: negative   Behvioral/Psych: negative  Endocrine: negative        PAST MEDICAL HISTORY:  Past Medical History:   Diagnosis Date    CAD (coronary artery disease) 11/10/2016    NSTEMI & 2 stents    Deafness 10/28/2012    DM (diabetes mellitus) (HCC)     Elevated cholesterol     Hypertension     NSTEMI (non-ST elevated myocardial infarction) ([facility] [address] 11/10/2016       PAST SURGICAL HISTORY:  Past Surgical History:   Procedure Laterality Date    COLONOSCOPY N/A 6/28/2018    COLONOSCOPY performed by Radha Smalls MD at [address] [place] [address] ENDOSCOPY    [address]  11/11/2016    2 stents       FAMILY HISTORY:  Family History   Problem Relation Age of Onset    Heart Disease Father     Heart Attack Father     Hypertension Mother     Elevated Lipids Brother     Elevated Lipids Brother     No Known Problems Sister     Elevated Lipids Brother     No Known Problems Son     No Known Problems Daughter     Anesth Problems Neg Hx        SOCIAL HISTORY:  Social History     Socioeconomic History    Marital status:    Tobacco Use    Smoking status: Never     Passive exposure: Never    Smokeless tobacco: Never   Vaping Use    Vaping Use: Never used   Substance and Sexual Activity    Alcohol use: Yes     Alcohol/week: 2.0 standard drinks     Types: 1 Cans of beer, 1 Shots of liquor per week     Comment: rarely    Drug use: No    Sexual activity: Yes     Social Determinants of Health     Financial Resource Strain: Medium Risk    Difficulty of Paying Living Expenses: Somewhat hard   Food Insecurity: Food Insecurity Present    Worried About Running Out of Food in the Last Year: Never true    Ran Out of Food in the Last Year: Often true       LABORATORY RESULTS:     Labs Latest Ref Rng & Units 12/30/2022 12/29/2022 12/29/2022 12/28/2022 12/28/2022 12/27/2022 12/27/2022   WBC 4.1 - 11.1 K/uL 11.0 - 10.3 - 9.3 - 6.2   RBC 4.10 - 5.70 M/uL 4.08(L) - 4.31 - 4.16 - 3.68(L)   Hemoglobin 12.1 - 17.0 g/dL 9.2(L) - 9. 6(L) - 9. 7(L) - 8. 3(L)   Hematocrit 36.6 - 50.3 % 30. 7(L) - 32. 1(L) - 32. 6(L) - 27. 6(L)   MCV 80.0 - 99.0 FL 75. 2(L) - 74. 5(L) - 78. 4(L) - 75. 0(L)   Platelets 699 - 178 K/uL 325 - 337 - 309 - 259   Lymphocytes 12 - 49 % 4(L) - 4(L) - 11(L) - 12   Monocytes 5 - 13 % 4(L) - 5 - 10 - 8   Eosinophils 0 - 7 % 0 - 0 - 4 - 4   Basophils 0 - 1 % 0 - 1 - 1 - 1   Albumin 3.5 - 5.0 g/dL 4.4 4.4 4.6 4.7 4.8 - 4.9   Calcium 8.5 - 10.1 MG/DL 10. 5(H) 10. 7(H) 10. 9(H) 10. 5(H) 10. 5(H) 10. 3(H) 10.1   Glucose 65 - 100 mg/dL 186(H) 124(H) 126(H) 237(H) 196(H) 322(H) 202(H)   BUN 6 - 20 MG/DL 94(H) 96(H) 87(H) 74(H) 75(H) 74(H) 71(H)   Creatinine 0.70 - 1.30 MG/DL 2.47(H) 2.66(H) 2.65(H) 2.77(H) 2.76(H) 2.94(H) 2.82(H)   Sodium 136 - 145 mmol/L 134(L) 134(L) 135(L) 131(L) 133(L) 133(L) 136   Potassium 3.5 - 5.1 mmol/L 3.4(L) 3.6 3.4(L) 4.0 3.3(L) 4.1 3.3(L)   TSH 0.36 - 3.74 uIU/mL - - - - - 2.12 -   PSA 0.01 - 4.0 ng/mL - - - - - 3.9 -   Some recent data might be hidden     Lab Results   Component Value Date/Time    TSH 2.12 12/27/2022 02:36 PM    TSH 4.80 (H) 12/06/2022 03:53 AM    TSH 5.39 (H) 10/12/2022 09:10 AM    TSH 3.53 02/03/2022 11:47 AM    TSH 5.790 (H) 11/21/2019 04:45 PM    TSH 3.08 06/22/2018 01:53 PM    TSH 4.250 05/26/2015 09:43 AM       ALLERGY:  No Known Allergies     CURRENT MEDICATIONS:    Current Facility-Administered Medications:     potassium chloride SR (KLOR-CON 10) tablet 40 mEq, 40 mEq, Oral, BID, Walker Samaniego MD, 40 mEq at 12/30/22 0825    insulin glargine (LANTUS) injection 40 Units, 40 Units, SubCUTAneous, DAILY, Alber [place], , 40 Units at 12/30/22 0825    pantoprazole (PROTONIX) tablet 40 mg, 40 mg, Oral, ACB, David Sheth, DO, 40 mg at 12/30/22 0825    diphenhydrAMINE (BENADRYL) capsule 50 mg, 50 mg, Oral, QHS, Lalo Ryan MD, 50 mg at 12/29/22 2134    hydrocortisone Sod Succ (PF) (SOLU-CORTEF) injection 50 mg, 50 mg, IntraVENous, Q12H, Colleen Astorga NP, 50 mg at 12/30/22 0826    heparin (porcine) injection 5,000 Units, 5,000 Units, SubCUTAneous, Q12H, [facility], , 5,000 Units at 12/30/22 0825    QUEtiapine (SEROquel) tablet 50 mg, 50 mg, Oral, QHS, Leticiaala [place]DO, 50 mg at 12/29/22 2134    lidocaine 4 % patch 1 Patch, 1 Patch, TransDERmal, Q24H, Walker Aponte MD, 1 Patch at 12/28/22 1706    bumetanide (BUMEX) 0.25 mg/mL infusion, 0.5 mg/hr, IntraVENous, TITRATE, Antoine, Jen Laurent MD, Last Rate: 2 mL/hr at 12/30/22 0637, 0.5 mg/hr at 12/30/22 0174    albuterol-ipratropium (DUO-NEB) 2.5 MG-0.5 MG/3 ML, 3 mL, Nebulization, Q6H RT, Walker Black MD, 3 mL at 12/30/22 0757    DOBUTamine (DOBUTREX) 500 mg/250 mL (2,000 mcg/mL) infusion, 3 mcg/kg/min, IntraVENous, CONTINUOUS, Zabrina Bradshaw MD, Last Rate: 5.1 mL/hr at 12/30/22 7298, 3 mcg/kg/min at 12/30/22 7097    balsam peru-castor oiL (VENELEX) ointment, , Topical, BID, Jay Harden MD, Given at 12/30/22 7990    NOREPINephrine (LEVOPHED) 8 mg in 5% dextrose 250mL (32 mcg/mL) infusion, 0.5-30 mcg/min, IntraVENous, TITRATE, Wicho MOLINA MD, Stopped at 12/24/22 2342    alteplase (CATHFLO) 1 mg in sterile water (preservative free) 1 mL injection, 1 mg, InterCATHeter, PRN, Wicho MOLINA MD    bacitracin 500 unit/gram packet 1 Packet, 1 Packet, Topical, PRN, Wicho MOLINA MD    glucose chewable tablet 16 g, 4 Tablet, Oral, PRN, Wicho MOLINA MD    glucagon (GLUCAGEN) injection 1 mg, 1 mg, IntraMUSCular, PRN, Francesca White MD    dextrose 10 % infusion 0-250 mL, 0-250 mL, IntraVENous, PRN, Walker Black MD    insulin lispro (HUMALOG) injection, , SubCUTAneous, AC&HS, Susanne Dejesus DO, 4 Units at 12/30/22 5484    insulin lispro (HUMALOG) injection 3 Units, 0.05 Units/kg, SubCUTAneous, TID WITH MEALS, Wicho MOLINA MD, 3 Units at 12/29/22 1856    senna-docusate (PERICOLACE) 8.6-50 mg per tablet 1 Tablet, 1 Tablet, Oral, BID PRN, Francesca White MD, 1 Tablet at 12/26/22 0649    bisacodyL (DULCOLAX) suppository 10 mg, 10 mg, Rectal, QHS PRN, Walker Black MD    sodium chloride (NS) flush 5-40 mL, 5-40 mL, IntraVENous, Q8H, Walker Aponte MD, 10 mL at 12/30/22 8807    sodium chloride (NS) flush 5-40 mL, 5-40 mL, IntraVENous, PRN, Sixto Oconnor MD, 10 mL at 12/28/22 0845    acetaminophen (TYLENOL) tablet 650 mg, 650 mg, Oral, Q6H PRN, 650 mg at 12/26/22 1714 **OR** acetaminophen (TYLENOL) suppository 650 mg, 650 mg, Rectal, Q6H PRN, Walker Tierney MD    polyethylene glycol (MIRALAX) packet 17 g, 17 g, Oral, DAILY PRN, Coleen MOLINA MD, 17 g at 12/26/22 0649    ondansetron (ZOFRAN ODT) tablet 4 mg, 4 mg, Oral, Q8H PRN **OR** ondansetron (ZOFRAN) injection 4 mg, 4 mg, IntraVENous, Q6H PRN, Coleen MOLINA MD, 4 mg at 12/24/22 0849    aspirin delayed-release tablet 81 mg, 81 mg, Oral, DAILY, Joseph Castleman, MD, 81 mg at 12/30/22 0825    ipratropium (ATROVENT) 21 mcg (0.03 %) nasal spray 2 Spray, 2 Spray, Both Nostrils, Q12H, Ranjit Pearson MD, 2 [place] at 12/30/22 0826    tamsulosin (FLOMAX) capsule 0.4 mg, 0.4 mg, Oral, DAILY, Ranjit Pearson MD, 0.4 mg at 12/30/22 0825    sodium chloride (NS) flush 5-40 mL, 5-40 mL, IntraVENous, PRN, Sixto Oconnor MD    PATIENT CARE TEAM:  Patient Care Team:  Ruben Nieto MD as PCP - General (Family Medicine)  Ruben Nieto MD as PCP - REHABILITATION [facility]  Jenny Lino MD (Cardiovascular Disease Physician)  Apolinar Libman, MD (Gastroenterology)  Connie Pierson MD (Cardiothoracic Surgery)  Loc Ayon MD (Cardiovascular Disease Physician)  Earl Campoverde MD (Nephrology)  Patricia Knutson RN as Care Transitions Nurse  Uma Patel MD (Pulmonary Disease)     Thank you for allowing me to participate in this patient's care.     Felecia Peabody, NP   [address] [place] [address], [address]  Phone: [phone]    On this date 12/30/2022, I have spent greater than 50% of a 30 minute visit personally reviewing new vitals, test results, notes, telemetry/EKG, face to face encounter/physical exam of patient, writing orders, performing medical decision making, and documenting.

## 2022-12-30 NOTE — PROGRESS NOTES
Problem: Mobility Impaired (Adult and Pediatric)  Goal: *Therapy Goal (Edit Goal, Insert Text)  Description: FUNCTIONAL STATUS PRIOR TO ADMISSION: Patient was independent and active without use of DME. Patient is currently driving. Patient is independent with ADLs and IADLs. HOME SUPPORT PRIOR TO ADMISSION: The patient lived with his wife, but did not require assist.    Physical Therapy Goals  Initiated 12/24/2022  1. Patient will move from supine to sit and sit to supine, scoot up and down, and roll side to side in bed with modified independence within 7 day(s). 2.  Patient will transfer from bed to chair and chair to bed with modified independence using the least restrictive device within 7 day(s). 3.  Patient will perform sit to stand with modified independence within 7 day(s). 4.  Patient will ambulate with modified independence for 150 feet with the least restrictive device within 7 day(s). 5.  Patient will ascend/descend 13 stairs with single handrail(s) with modified independence within 7 day(s). Outcome: Progressing Towards Goal   PHYSICAL THERAPY TREATMENT  Patient: Sammie Schmitz (76 y.o. male)  Date: 12/30/2022  Diagnosis: [facility] [A41.9] <principal problem not specified>      Precautions: Fall  Chart, physical therapy assessment, plan of care and goals were reviewed. ASSESSMENT  Patient continues with skilled PT services and is progressing towards goals. Patient received supine in bed, family present, and patient agreeable to therapy. VSS during all position changes and able to progress ambulation with HHA x1. No overt LOB but mild instability throughout session requiring HHA to steady. Would benefit from RW vs rollator trial next session. Tolerating mobility much better today but still below baseline. Would benefit from short term rehab-- now better able to tolerate IPR. If home, would need first floor living and likely RW + HHPT.  Has extensive family but very few who are local and can physically assist wife in his care. Current Level of Function Impacting Discharge (mobility/balance): min A    Other factors to consider for discharge: stable on room air, no longer orthostatic          PLAN :  Patient continues to benefit from skilled intervention to address the above impairments. Continue treatment per established plan of care. to address goals. Recommendation for discharge: (in order for the patient to meet his/her long term goals)  Therapy 3 hours per day 5-7 days per week-- if home, then HHPT and likely RW     This discharge recommendation:  Has been made in collaboration with the attending provider and/or case management    IF patient discharges home will need the following DME: RW ? SUBJECTIVE:   Patient stated I'm okay.     OBJECTIVE DATA SUMMARY:   Critical Behavior:  Neurologic State: Alert  Orientation Level: Oriented X4  Cognition: Appropriate decision making  Safety/Judgement: Awareness of environment  Functional Mobility Training:  Bed Mobility:  Rolling: Contact guard assistance  Supine to Sit: Minimum assistance  Scooting: Contact guard assistance  Transfers:  Sit to Stand: Minimum assistance  Stand to Sit: Minimum assistance  Balance:  Sitting: Intact; Without support  Standing: Impaired; With support  Standing - Static: Fair  Standing - Dynamic : Fair  Ambulation/Gait Training:  Distance (ft): 120 Feet (ft) (20+60+40)  Assistive Device: Gait belt (HHA + chair follow)  Ambulation - Level of Assistance: Contact guard assistance;Minimal assistance;Assist x2  Gait Abnormalities: Decreased step clearance;Shuffling gait  Base of Support: Widened  Speed/Bette: Shuffled    Activity Tolerance:   Good and requires rest breaks    After treatment patient left in no apparent distress:   Sitting in chair, Call bell within reach, and Caregiver / family present    COMMUNICATION/COLLABORATION:   The patients plan of care was discussed with: Occupational therapist and Registered nurse.      Jimenez Davis, PT, DPT   Time Calculation: 27 mins

## 2022-12-31 ENCOUNTER — APPOINTMENT (OUTPATIENT)
Dept: GENERAL RADIOLOGY | Age: 72
DRG: 871 | End: 2022-12-31
Attending: INTERNAL MEDICINE
Payer: MEDICARE

## 2022-12-31 LAB
ALBUMIN SERPL-MCNC: 4.4 G/DL (ref 3.5–5)
ALBUMIN/GLOB SERPL: 1.2 (ref 1.1–2.2)
ALP SERPL-CCNC: 180 U/L (ref 45–117)
ALT SERPL-CCNC: 222 U/L (ref 12–78)
ANION GAP SERPL CALC-SCNC: 10 MMOL/L (ref 5–15)
AST SERPL-CCNC: 64 U/L (ref 15–37)
BASOPHILS # BLD: 0 K/UL (ref 0–0.1)
BASOPHILS NFR BLD: 0 % (ref 0–1)
BILIRUB DIRECT SERPL-MCNC: 0.3 MG/DL (ref 0–0.2)
BILIRUB SERPL-MCNC: 0.6 MG/DL (ref 0.2–1)
BUN SERPL-MCNC: 111 MG/DL (ref 6–20)
BUN/CREAT SERPL: 48 (ref 12–20)
CALCIUM SERPL-MCNC: 10.5 MG/DL (ref 8.5–10.1)
CALCIUM SERPL-MCNC: 10.7 MG/DL (ref 8.5–10.1)
CHLORIDE SERPL-SCNC: 97 MMOL/L (ref 97–108)
CO2 SERPL-SCNC: 26 MMOL/L (ref 21–32)
CREAT SERPL-MCNC: 2.3 MG/DL (ref 0.7–1.3)
DIFFERENTIAL METHOD BLD: ABNORMAL
EOSINOPHIL # BLD: 0 K/UL (ref 0–0.4)
EOSINOPHIL NFR BLD: 0 % (ref 0–7)
ERYTHROCYTE [DISTWIDTH] IN BLOOD BY AUTOMATED COUNT: 19.3 % (ref 11.5–14.5)
GLOBULIN SER CALC-MCNC: 3.6 G/DL (ref 2–4)
GLUCOSE BLD STRIP.AUTO-MCNC: 185 MG/DL (ref 65–117)
GLUCOSE BLD STRIP.AUTO-MCNC: 199 MG/DL (ref 65–117)
GLUCOSE BLD STRIP.AUTO-MCNC: 252 MG/DL (ref 65–117)
GLUCOSE BLD STRIP.AUTO-MCNC: 416 MG/DL (ref 65–117)
GLUCOSE SERPL-MCNC: 315 MG/DL (ref 65–100)
HCT VFR BLD AUTO: 33 % (ref 36.6–50.3)
HGB BLD-MCNC: 9.7 G/DL (ref 12.1–17)
IMM GRANULOCYTES # BLD AUTO: 0.1 K/UL (ref 0–0.04)
IMM GRANULOCYTES NFR BLD AUTO: 1 % (ref 0–0.5)
LYMPHOCYTES # BLD: 0.4 K/UL (ref 0.8–3.5)
LYMPHOCYTES NFR BLD: 3 % (ref 12–49)
MAGNESIUM SERPL-MCNC: 2.8 MG/DL (ref 1.6–2.4)
MCH RBC QN AUTO: 22.6 PG (ref 26–34)
MCHC RBC AUTO-ENTMCNC: 29.4 G/DL (ref 30–36.5)
MCV RBC AUTO: 76.9 FL (ref 80–99)
MONOCYTES # BLD: 0.7 K/UL (ref 0–1)
MONOCYTES NFR BLD: 5 % (ref 5–13)
NEUTS SEG # BLD: 12.4 K/UL (ref 1.8–8)
NEUTS SEG NFR BLD: 91 % (ref 32–75)
NRBC # BLD: 0 K/UL (ref 0–0.01)
NRBC BLD-RTO: 0 PER 100 WBC
PHOSPHATE SERPL-MCNC: 3.8 MG/DL (ref 2.6–4.7)
PLATELET # BLD AUTO: 330 K/UL (ref 150–400)
PLATELET COMMENTS,PCOM: ABNORMAL
PMV BLD AUTO: 11 FL (ref 8.9–12.9)
POTASSIUM SERPL-SCNC: 3.6 MMOL/L (ref 3.5–5.1)
PROCALCITONIN SERPL-MCNC: 0.24 NG/ML
PROT SERPL-MCNC: 8 G/DL (ref 6.4–8.2)
PTH-INTACT SERPL-MCNC: 87.6 PG/ML (ref 18.4–88)
RBC # BLD AUTO: 4.29 M/UL (ref 4.1–5.7)
RBC MORPH BLD: ABNORMAL
SERVICE CMNT-IMP: ABNORMAL
SODIUM SERPL-SCNC: 133 MMOL/L (ref 136–145)
WBC # BLD AUTO: 13.6 K/UL (ref 4.1–11.1)

## 2022-12-31 PROCEDURE — 99233 SBSQ HOSP IP/OBS HIGH 50: CPT | Performed by: INTERNAL MEDICINE

## 2022-12-31 PROCEDURE — 65660000001 HC RM ICU INTERMED STEPDOWN

## 2022-12-31 PROCEDURE — 80076 HEPATIC FUNCTION PANEL: CPT

## 2022-12-31 PROCEDURE — 74011250636 HC RX REV CODE- 250/636: Performed by: STUDENT IN AN ORGANIZED HEALTH CARE EDUCATION/TRAINING PROGRAM

## 2022-12-31 PROCEDURE — 71045 X-RAY EXAM CHEST 1 VIEW: CPT

## 2022-12-31 PROCEDURE — 82962 GLUCOSE BLOOD TEST: CPT

## 2022-12-31 PROCEDURE — 74011636637 HC RX REV CODE- 636/637: Performed by: INTERNAL MEDICINE

## 2022-12-31 PROCEDURE — 85025 COMPLETE CBC W/AUTO DIFF WBC: CPT

## 2022-12-31 PROCEDURE — 94640 AIRWAY INHALATION TREATMENT: CPT

## 2022-12-31 PROCEDURE — 84100 ASSAY OF PHOSPHORUS: CPT

## 2022-12-31 PROCEDURE — 74011250636 HC RX REV CODE- 250/636: Performed by: NURSE PRACTITIONER

## 2022-12-31 PROCEDURE — 51798 US URINE CAPACITY MEASURE: CPT

## 2022-12-31 PROCEDURE — 74011636637 HC RX REV CODE- 636/637: Performed by: STUDENT IN AN ORGANIZED HEALTH CARE EDUCATION/TRAINING PROGRAM

## 2022-12-31 PROCEDURE — 74011250637 HC RX REV CODE- 250/637: Performed by: NURSE PRACTITIONER

## 2022-12-31 PROCEDURE — 77030005513 HC CATH URETH FOL11 MDII -B

## 2022-12-31 PROCEDURE — 74011000250 HC RX REV CODE- 250: Performed by: INTERNAL MEDICINE

## 2022-12-31 PROCEDURE — 74011250637 HC RX REV CODE- 250/637: Performed by: FAMILY MEDICINE

## 2022-12-31 PROCEDURE — 84145 PROCALCITONIN (PCT): CPT

## 2022-12-31 PROCEDURE — 74011000250 HC RX REV CODE- 250: Performed by: STUDENT IN AN ORGANIZED HEALTH CARE EDUCATION/TRAINING PROGRAM

## 2022-12-31 PROCEDURE — 74011250637 HC RX REV CODE- 250/637: Performed by: STUDENT IN AN ORGANIZED HEALTH CARE EDUCATION/TRAINING PROGRAM

## 2022-12-31 PROCEDURE — 83970 ASSAY OF PARATHORMONE: CPT

## 2022-12-31 PROCEDURE — 83521 IG LIGHT CHAINS FREE EACH: CPT

## 2022-12-31 PROCEDURE — 80048 BASIC METABOLIC PNL TOTAL CA: CPT

## 2022-12-31 PROCEDURE — 77030019905 HC CATH URETH INTMIT MDII -A

## 2022-12-31 PROCEDURE — 83735 ASSAY OF MAGNESIUM: CPT

## 2022-12-31 RX ORDER — BUMETANIDE 0.25 MG/ML
2 INJECTION INTRAMUSCULAR; INTRAVENOUS EVERY 12 HOURS
Status: DISCONTINUED | OUTPATIENT
Start: 2023-01-01 | End: 2023-01-08

## 2022-12-31 RX ORDER — INSULIN LISPRO 100 [IU]/ML
15 INJECTION, SOLUTION INTRAVENOUS; SUBCUTANEOUS ONCE
Status: COMPLETED | OUTPATIENT
Start: 2022-12-31 | End: 2022-12-31

## 2022-12-31 RX ORDER — INSULIN GLARGINE 100 [IU]/ML
44 INJECTION, SOLUTION SUBCUTANEOUS DAILY
Status: DISCONTINUED | OUTPATIENT
Start: 2023-01-01 | End: 2023-01-02

## 2022-12-31 RX ORDER — CLOPIDOGREL BISULFATE 75 MG/1
75 TABLET ORAL DAILY
Status: DISCONTINUED | OUTPATIENT
Start: 2022-12-31 | End: 2023-01-13

## 2022-12-31 RX ADMIN — HEPARIN SODIUM 5000 UNITS: 5000 INJECTION INTRAVENOUS; SUBCUTANEOUS at 08:02

## 2022-12-31 RX ADMIN — POTASSIUM CHLORIDE 40 MEQ: 750 TABLET, FILM COATED, EXTENDED RELEASE ORAL at 08:08

## 2022-12-31 RX ADMIN — PANTOPRAZOLE SODIUM 40 MG: 40 TABLET, DELAYED RELEASE ORAL at 08:09

## 2022-12-31 RX ADMIN — IPRATROPIUM BROMIDE AND ALBUTEROL SULFATE 3 ML: .5; 3 SOLUTION RESPIRATORY (INHALATION) at 13:27

## 2022-12-31 RX ADMIN — CLOPIDOGREL BISULFATE 75 MG: 75 TABLET ORAL at 08:45

## 2022-12-31 RX ADMIN — SODIUM CHLORIDE, PRESERVATIVE FREE 10 ML: 5 INJECTION INTRAVENOUS at 05:49

## 2022-12-31 RX ADMIN — Medication 3 UNITS: at 07:59

## 2022-12-31 RX ADMIN — INSULIN GLARGINE 40 UNITS: 100 INJECTION, SOLUTION SUBCUTANEOUS at 08:00

## 2022-12-31 RX ADMIN — Medication 4 UNITS: at 16:42

## 2022-12-31 RX ADMIN — SODIUM CHLORIDE, PRESERVATIVE FREE 10 ML: 5 INJECTION INTRAVENOUS at 21:14

## 2022-12-31 RX ADMIN — HYDROCORTISONE SODIUM SUCCINATE 50 MG: 100 INJECTION, POWDER, FOR SOLUTION INTRAMUSCULAR; INTRAVENOUS at 08:10

## 2022-12-31 RX ADMIN — IPRATROPIUM BROMIDE 2 SPRAY: 21 SPRAY, METERED NASAL at 21:13

## 2022-12-31 RX ADMIN — Medication: at 08:17

## 2022-12-31 RX ADMIN — BUMETANIDE 0.5 MG/HR: 0.25 INJECTION, SOLUTION INTRAMUSCULAR; INTRAVENOUS at 05:49

## 2022-12-31 RX ADMIN — Medication 7 UNITS: at 07:59

## 2022-12-31 RX ADMIN — SODIUM CHLORIDE, PRESERVATIVE FREE 10 ML: 5 INJECTION INTRAVENOUS at 13:24

## 2022-12-31 RX ADMIN — IPRATROPIUM BROMIDE AND ALBUTEROL SULFATE 3 ML: .5; 3 SOLUTION RESPIRATORY (INHALATION) at 07:43

## 2022-12-31 RX ADMIN — POTASSIUM CHLORIDE 40 MEQ: 750 TABLET, FILM COATED, EXTENDED RELEASE ORAL at 17:21

## 2022-12-31 RX ADMIN — TAMSULOSIN HYDROCHLORIDE 0.4 MG: 0.4 CAPSULE ORAL at 08:08

## 2022-12-31 RX ADMIN — Medication 15 UNITS: at 12:00

## 2022-12-31 RX ADMIN — HYDROCORTISONE SODIUM SUCCINATE 50 MG: 100 INJECTION, POWDER, FOR SOLUTION INTRAMUSCULAR; INTRAVENOUS at 21:13

## 2022-12-31 RX ADMIN — IPRATROPIUM BROMIDE 2 SPRAY: 21 SPRAY, METERED NASAL at 08:16

## 2022-12-31 RX ADMIN — IPRATROPIUM BROMIDE AND ALBUTEROL SULFATE 3 ML: .5; 3 SOLUTION RESPIRATORY (INHALATION) at 20:15

## 2022-12-31 RX ADMIN — ASPIRIN 81 MG: 81 TABLET, COATED ORAL at 08:08

## 2022-12-31 RX ADMIN — Medication 4 UNITS: at 21:13

## 2022-12-31 RX ADMIN — Medication: at 21:13

## 2022-12-31 RX ADMIN — HEPARIN SODIUM 5000 UNITS: 5000 INJECTION INTRAVENOUS; SUBCUTANEOUS at 21:12

## 2022-12-31 RX ADMIN — QUETIAPINE FUMARATE 50 MG: 25 TABLET ORAL at 21:13

## 2022-12-31 NOTE — PROGRESS NOTES
Verbal bedside shift change report given to Michelle Guidry (oncoming nurse) by Neelam Stein (offgoing nurse). Report included the following information SBAR, Kardex, Intake/Output, MAR, Recent Results, Cardiac Rhythm Sinus Rhythm, and Alarm Parameters.       0500 Vasquez removed, beginning voiding trials per Urology note 12/23/2022, fitted with Tania Marco Antonio Report to [facility]

## 2022-12-31 NOTE — PROGRESS NOTES
ATTENDING CARDIOLOGIST    The patient was personally examined and chart reviewed. All the elements of history and examination were personally performed and I agree with the plan as listed by advanced practice provider. Treatment plan was addressed with the patient. Subjective:  Stable  Diuresing  BP ok  Net negative 2.9 L    Blood pressure 108/65, pulse 90, temperature 98 °F (36.7 °C), resp. rate 15, height 5' 9\" (1.753 m), weight 49.9 kg (110 lb 0.2 oz), SpO2 96 %. Heart: Normal rate, regular rhythm, normal S1/S2, no murmur  Lungs: Clear  Abdomen: Distended  Extremities: no edema      A/P:  1. Dyspnea/decompensated HFrEF- EF 25-30%. Continue low dose dobutamine 3mcg/kg/min + IV bumex gtt. Weight stable but negative 2900 ml fluid balance. Creatinine stable, LFTs slightly decreased. Rapid deterioration in LV function with overall WMA on recent echo appears not to be in teritorry of ischemia suggesting alternative mechanism for  cardiomyopathy (stress SMP vs myocarditis). Basal segments appear to be hyperdynamic compared to apical segments which may point towards a possibility of stress cardiomyopathy. Plan to transfer to [facility] for cardiac MRI Tuesday. Unable to tolerate GDMT for CHF at this time due to borderline hypotension. 2  CAD - sp CABG in 2018, cath 9/8/2021 open LIMA, PCI to OM 2 all the way up to the left main 2/28/2022. Cath 12/6/2022 open LIMA and RCA graft some in-stent restenosis in the long graft from the left main to OM 2 but not severe and the OM1 was more severely diseased in the small vessel. Interventional cardiology felt no reasonable vessel to intervene and recommended medical management. The OM disease is consistent with a lateral wall ischemia demonstrated on prior nuclear stress test.  Continue aspirin, plavix. Holding statin for elevated LFTs. Continue to monitor CBC for anemia. Hgb up to 9.7 currently.     3.  Aortic stenosis mild mean gradient of 7 nn Hg, HUY 1.4 by echo 11/27/2022 -mild , murmur     4. Diabetes mellitus type 2- On insulin, management per IM     5. PAD: S/P Right SFA PTCA 12/6/22. Continue ASA, plavix. Holding statin for elevated LFTs. Needs follow up with Dr Jose Saleh in February with MARIANELA/Dopplers    6. 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 DNR status in place       []    High complexity decision making was performed  []    Patient is at high-risk of decompensation with multiple organ involvement      Trisha Kaye. Francisco Sanders MD, [facility]  [facility] [address]. [address], [address]                                   Office [phone]      Fax [phone]                                                                            [facility]  Cardiology Care Note                  []Initial visit     [x]Established visit     Patient Name: Millie Preciado - NOU:[id number]  Primary Cardiologist: Mary Beth Ramos MD  Consulting Cardiologist: Harry Clarke MD     Reason for initial visit:  dyspnea, decompensated HF, tachycardia. HPI:     CAD with CABG 6/9/2018. NSTEMI in November 2016 and resolved pulmonary HTN. Stent to circumflex an LAD in 2016. Stress echo in 2018 with a drop in BP with exercise and a drop in EF. Cath on 6/22/18 that showed even with a 5F catheter, he dampened with suspected ostial 3 vessel disease. Echo 3/27/2021 = EF 55 to 60% mild AR MR TR LAE MAC  Nuclear 3/18/2021 EF 64% medium size defect apical and inferior lateral reversible ischemia medium defect mid and inferolateral nonreversible appear to be infarction intermediate risk stress test .  PCI 02/28/2022--patent LIMA to LAD, vein graft to distal RCA with severe disease in the native vessels Occluded vein graft to OM 2. Native OM 2 severely diseased along with proximal to mid circumflex as well as distal left main. Overall the vessel is significantly remodeled negatively. Additionally there is significant disease in the first marginal branch which is even smaller as well as AV groove branch right at the takeoff of OM2. Single stent 2.25 x 28 mm Xience was placed extending from the OM 2 into the circumflex into the distal left main and sequentially dilated with 2.25 noncompliant, 2 5 noncompliant, 3 oh noncompliant and 3 5 noncompliant to perform multilevel POT to manage multiple bifurcations on the way. Atherectomy was considered but given small size of the vessels, was not deemed to be absolutely safe for the obtuse marginal lesion. Overall stent expanded fairly well related to the size of the vessels. Normal LVEDP  Nuclear stress October 27, 2022 test showed ischemia similar to 3/15/2021 with a medium size defect in the mid to distal inferolateral and anterolateral segments in the circumflex territory he had septal dyskinesia with an EF of 39%. He had a fixed apical infarct  Echocardiogram October 27, 2022 showed an EF of 45 to 50% TAPSE at 1.2 showing reduced RV function sclerosis of the aortic valve with stenosis that was very mild with a valve area of mean of 7 mmHg and HUY of 1.4 cm². The mitral was thickened with restricted mobility and mild MR.      SUBJECTIVE:    Remains on low dose dobutamine and bumex gtt, negative 2900ml today  Creatinine stable  Denies chest pain, feels a little heart racing from time to time, no arrhythmias noted on telemetry  Reports improvement in dyspnea, no LE edema  Denies lightheadedness or dizziness  Discussed plan to continue diuresis, no family at bedside currently      Assessment and Plan       1. Dyspnea/decompensated HFrEF- EF 25-30%. Continue low dose dobutamine 3mcg/kg/min + IV bumex gtt. Weight stable but negative 2900 ml fluid balance. Creatinine stable, LFTs slightly decreased.   Rapid deterioration in LV function with overall WMA on recent echo appears not to be in teritorry of ischemia suggesting alternative mechanism for  cardiomyopathy (stress SMP vs myocarditis). Basal segments appear to be hyperdynamic compared to apical segments which may point towards a possibility of stress cardiomyopathy. Plan to transfer to [facility] for cardiac MRI Tuesday. Unable to tolerate GDMT for CHF at this time due to borderline hypotension. 2  CAD - sp CABG in 2018, cath 9/8/2021 open LIMA, PCI to OM 2 all the way up to the left main 2/28/2022. Cath 12/6/2022 open LIMA and RCA graft some in-stent restenosis in the long graft from the left main to OM 2 but not severe and the OM1 was more severely diseased in the small vessel. Interventional cardiology felt no reasonable vessel to intervene and recommended medical management. The OM disease is consistent with a lateral wall ischemia demonstrated on prior nuclear stress test.  Continue aspirin, plavix. Holding statin for elevated LFTs. Continue to monitor CBC for anemia. Hgb up to 9.7 currently. 3.  Aortic stenosis mild mean gradient of 7 nn Hg, HUY 1.4 by echo 11/27/2022 -mild , murmur     4. Diabetes mellitus type 2- On insulin, management per IM     5. PAD: S/P Right SFA PTCA 12/6/22. Continue ASA, plavix. Holding statin for elevated LFTs. Needs follow up with Dr Kera Bajwa in February with MARIANELA/Dopplers    6. 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 DNR status in place          ____________________________________________________________    Cardiac testing  12/22/22    ECHO ADULT COMPLETE 12/26/2022 12/26/2022    Interpretation Summary    Left Ventricle: Severely reduced left ventricular systolic function with a visually estimated EF of 25 - 30%. Left ventricle size is normal. Normal wall thickness. There are regional wall motion abnormalities. Grade II diastolic dysfunction with increased LAP. Right Ventricle: Moderately reduced systolic function.  TAPSE is abnormal. TAPSE is 1.1 cm. Aortic Valve: Mild stenosis of the aortic valve. AV peak gradient is 13 mmHg. AV peak velocity is 1.8 m/s. Mitral Valve: Not well visualized. Moderate annular calcification at the posterior leaflet of the mitral valve. Mild to moderate regurgitation. Tricuspid Valve: Mildly elevated RVSP. Left Atrium: Left atrium is moderately dilated. Signed by: Natasha Arizmendi MD on 12/26/2022  3:13 PM          10/27/22    NUCLEAR CARDIAC STRESS TEST 10/27/2022 11/1/2022    Interpretation Summary    Nuclear Findings: The study is positive for myocardial ischemia. The defect appears to be ischemia. The areas of ischemia are similar to 3/15/2021 study. Nuclear Findings: There is a moderate severity left ventricular stress perfusion defect that is medium in size present in the mid to distal inferolateral and anterolateral segment(s) that is reversible. The defect is consistent with abnormal perfusion in the LCx territory. There is normal wall motion in the defect area. The defect appears to be probable ischemia. There is a moderate severity second left ventricular stress perfusion defect. The defect appears to be infarction. Nuclear Findings: Abnormal left ventricular systolic function post-stress. Septal dyskinesis. Post-stress ejection fraction is 39%. ECG: Resting ECG demonstrates normal sinus rhythm. ECG: Stress ECG was negative for ischemia. Stress Test: A pharmacological stress test was performed using lexiscan. Blood pressure demonstrated a normal response and heart rate demonstrated a normal response to stress. The patient's heart rate recovery was normal. The patient reported no symptoms during the stress test.    Signed by: Brett Edgar MD on 10/27/2022  4:45 PM    02/28/22    CARDIAC PROCEDURE 02/28/2022 2/28/2022    Conclusion  Findings  1. Severe native multivessel coronary disease  2.   Patent LIMA to LAD, vein graft to distal RCA with severe disease in the native vessels  3. Occluded vein graft to OM 2. Native OM 2 severely diseased along with proximal to mid circumflex as well as distal left main. Overall the vessel is significantly remodeled negatively. Additionally there is significant disease in the first marginal branch which is even smaller as well as AV groove branch right at the takeoff of OM2. Single stent 2.25 x 28 mm Xience was placed extending from the OM 2 into the circumflex into the distal left main and sequentially dilated with 2.25 noncompliant, 2 5 noncompliant, 3 oh noncompliant and 3 5 noncompliant to perform multilevel POT to manage multiple bifurcations on the way. Atherectomy was considered but given small size of the vessels, was not deemed to be absolutely safe for the obtuse marginal lesion. Overall stent expanded fairly well related to the size of the vessels. 4.  Normal LVEDP    Access right radial: Small vessel, tortuous} brachiocephalic  Contrast 65 cc    Recommendations  1. Plavix based dual antiplatelet therapy  2. Guideline directed medical therapy for secondary prevention of coronary events    Signed by: Sánchez Santamaria MD on 2/28/2022  4:22 PM    Most recent HS troponins:  No results for input(s): TROPHS in the last 72 hours.     No lab exists for component:  CKMB      Review of Systems    [x]All other systems reviewed and all negative except as written in HPI    [] Patient unable to provide secondary to condition         Past Medical History:   Diagnosis Date    CAD (coronary artery disease) 11/10/2016    NSTEMI & 2 stents    Deafness 10/28/2012    DM (diabetes mellitus) ([facility] [address]     Elevated cholesterol     Hypertension     NSTEMI (non-ST elevated myocardial infarction) ([facility] [address] 11/10/2016     Past Surgical History:   Procedure Laterality Date    COLONOSCOPY N/A 6/28/2018    COLONOSCOPY performed by Kelly Costa MD at [address]  11/11/2016    2 stents     Social Hx:  reports that he has never smoked. He has never been exposed to tobacco smoke. He has never used smokeless tobacco. He reports current alcohol use of about 2.0 standard drinks per week. He reports that he does not use drugs. Family Hx: family history includes Elevated Lipids in his brother, brother, and brother; Heart Attack in his father; Heart Disease in his father; Hypertension in his mother; No Known Problems in his daughter, sister, and son. No Known Allergies       OBJECTIVE:  Wt Readings from Last 3 Encounters:   12/31/22 110 lb 0.2 oz (49.9 kg)   12/19/22 129 lb (58.5 kg)   12/16/22 126 lb 8.7 oz (57.4 kg)       Intake/Output Summary (Last 24 hours) at 12/31/2022 0819  Last data filed at 12/31/2022 0700  Gross per 24 hour   Intake 383.3 ml   Output 2820 ml   Net -2436.7 ml           Physical Exam    Vitals:   Vitals:    12/31/22 0600 12/31/22 0700 12/31/22 0743 12/31/22 0800   BP: (!) 105/50 (!) 118/53  127/67   Pulse: 93 92  92   Resp: 14 16  14   Temp:    97.8 °F (36.6 °C)   SpO2: 95% 96% 98% 100%   Weight:       Height:         Telemetry: normal sinus rhythm    /67   Pulse 92   Temp 97.8 °F (36.6 °C)   Resp 14   Ht 5' 9\" (1.753 m)   Wt 110 lb 0.2 oz (49.9 kg)   SpO2 100%   BMI 16.25 kg/m²   General:    Alert, cooperative, no distress. Psychiatric:    Normal Mood and affect    Eye/ENT:      No asymmetry, Conjunctival pink. Able to hear voice at normal amplitude   Lungs:      Visibly symmetric chest expansion, No palpable tenderness. Diminished bases but clear. Heart[de-identified]    Regular rate and rhythm, S1, S2 normal, no murmur, click, rub or gallop. No JVD, Normal palpable peripheral pulses. Abdomen:     Soft, non-tender. Bowel sounds normal. Slightly distended by soft   Extremities:   Extremities normal, atraumatic, no edema. Neurologic:   CN II-XII grossly intact.  No gross focal deficits       Data Review:     Radiology:   XR Results (most recent):  Results from Hospital Encounter encounter on 12/22/22    XR CHEST PORT    Narrative  EXAM: XR CHEST PORT    DATE: 12/24/2022 6:04 PM    INDICATION: CVC placement    COMPARISON: Chest radiograph December 24, 2022 at 1412 hours    FINDINGS: AP portable chest radiograph. There is a new RIGHT IJ central venous  catheter with the tip near the cavoatrial junction. Patient is status post  sternotomy and CABG. The heart is mildly enlarged but stable. The lungs are  hyperinflated. There are persistent, small bilateral pleural effusions. There is  no pneumothorax. A skin fold projects over the RIGHT upper lung. The vascular  clarity is again mildly diminished. Impression  1. RIGHT IJ catheter is in satisfactory position with the tip at the cavoatrial  junction. No pneumothorax. 2. No change in small bilateral effusions. CT Results (most recent):  Results from Hospital Encounter encounter on 12/22/22    CT ABD PELV WO CONT    Narrative  EXAM: CT ABD PELV WO CONT    INDICATION: rlq abdominal pain    COMPARISON: 5/3/2014    IV CONTRAST: None. ORAL CONTRAST: None    TECHNIQUE:  Thin axial images were obtained through the abdomen and pelvis. Coronal and  sagittal reformats were generated. CT dose reduction was achieved through use of  a standardized protocol tailored for this examination and automatic exposure  control for dose modulation. The absence of intravenous contrast material reduces the sensitivity for  evaluation of the vasculature and solid organs. FINDINGS:  LOWER THORAX: Small bilateral pleural effusions. Partial collapse bases  LIVER: No mass. BILIARY TREE: Gallstones. No evidence of acute cholecystitis CBD is not dilated. SPLEEN: within normal limits. PANCREAS: No focal abnormality. ADRENALS: 19 mm right adrenal nodule unchanged,  KIDNEYS/URETERS: Mild bilateral hydronephrosis. No stone  STOMACH: Unremarkable. SMALL BOWEL: No dilatation or wall thickening.   COLON: No dilatation or wall thickening. APPENDIX: Surgically absent  PERITONEUM: No ascites or pneumoperitoneum. RETROPERITONEUM: No lymphadenopathy or aortic aneurysm. Extensive vascular  calcifications  REPRODUCTIVE ORGANS: Mildly enlarged  URINARY BLADDER: Massive distention  BONES: No destructive bone lesion. ABDOMINAL WALL: Small umbilical hernia containing fat. ADDITIONAL COMMENTS: N/A    Impression  1. Bladder is massively distended with mild bilateral hydronephrosis. May  indicate bladder outlet obstruction. Prostate is mildly enlarged  2. Gallstones. No acute cholecystitis  3. Small bilateral pleural effusions    MRI Results (most recent):  Results from Hospital Encounter encounter on 05/22/16    MRI LUMB SPINE WO CONT    Narrative  **Final Report**      ICD Codes / Adm. Diagnosis: 724.2  M54.5 / Lumbago  Low back pain  Examination:  MR Karin Granados CON  - [id number] - May 22 2016  1:45PM  Accession No:  [id number]  Reason:  Low back pain      REPORT:  Indication: Pain and back extends into right leg to foot    Exam: MRI of the lumbar spine. Sequences include sagittal and axial T1 and  T2-weighted images. Sagittal STIR. Comparisons: April 27, 2016    Contrast: None    Findings: Alignment is normal. There is no evidence of marrow replacement or  acute fracture. Cord terminus is within normal limits. Paraspinous soft  tissues are within normal limits. T12-L1: No stenosis    L1-L2: There is desiccation of this disc with a small bulge but no stenosis    L2-L3: There is desiccation of this disc with a small bulge but no stenosis    L3-L4: There is mild left facet arthropathy but no stenosis    L4-L5: There is disc height loss with a small disc bulge. Facet arthropathy. No stenosis    L5-S1: There is disc height loss with a small disc bulge and left  paracentral disc extrusion extending inferiorly. This mildly distorts the  left S1 nerve root in the subarticular zone and left lateral recess.  There  are facet degenerative changes with moderate left and mild-to-moderate right  foraminal narrowing    Impression  :  1. Multilevel degenerative change detailed by level above most significant  at L5-S1                Signing/Reading Doctor: Yenifer Arechiga ([id number])  Approved: Yenifer Arechiga ([id number])  May 23 2016  8:39AM      No results for input(s): CPK, TROIQ in the last 72 hours. No lab exists for component: CKQMB, CPKMB, BMPP  Recent Labs     12/31/22 0314 12/31/22 0306 12/30/22 0314   *  --  134*   K 3.6  --  3.4*   CL 97  --  95*   CO2 26  --  27   *  --  94*   CREA 2.30*  --  2.47*   *  --  186*   PHOS 3.8  --  5.4*   CA 10.5* 10.7* 10.5*     Recent Labs     12/31/22 0314 12/30/22 0314   WBC 13.6* 11.0   HGB 9.7* 9.2*   HCT 33.0* 30.7*    325     Recent Labs     12/31/22 0314 12/30/22 0314   * 167*       No results for input(s): CHOL, LDLC in the last 72 hours. No lab exists for component: TGL, HDLC,  HBA1C  No results for input(s): CRP, TSH, TSHEXT, TSHEXT in the last 72 hours.     No lab exists for component: ESR      Current meds:    Current Facility-Administered Medications:     clopidogreL (PLAVIX) tablet 75 mg, 75 mg, Oral, DAILY, Nicki Mccormick, JACOB    potassium chloride SR (KLOR-CON 10) tablet 40 mEq, 40 mEq, Oral, BID, Lilian MOLINA MD, 40 mEq at 12/31/22 0808    insulin glargine (LANTUS) injection 40 Units, 40 Units, SubCUTAneous, DAILY, Jerrol Floor, DO, 40 Units at 12/31/22 0800    pantoprazole (PROTONIX) tablet 40 mg, 40 mg, Oral, ACB, Jerrol Floor, DO, 40 mg at 12/31/22 0809    diphenhydrAMINE (BENADRYL) capsule 50 mg, 50 mg, Oral, QHS, João Chan MD, 50 mg at 12/30/22 2115    hydrocortisone Sod Succ (PF) (SOLU-CORTEF) injection 50 mg, 50 mg, IntraVENous, Q12H, Colleen Astorga NP, 50 mg at 12/31/22 0810    heparin (porcine) injection 5,000 Units, 5,000 Units, SubCUTAneous, Q12H, Kel Bustos DO, 5,000 Units at 12/31/22 1220 QUEtiapine (SEROquel) tablet 50 mg, 50 mg, Oral, QHS, Kacy MattDO kareem, 50 mg at 12/30/22 2115    lidocaine 4 % patch 1 Patch, 1 Patch, TransDERmal, Q24H, Walker Aponte MD, 1 Patch at 12/28/22 1706    bumetanide (BUMEX) 0.25 mg/mL infusion, 0.5 mg/hr, IntraVENous, TITRATE, Abou-Assi, Angie Wilkins MD, Last Rate: 2 mL/hr at 12/31/22 0549, 0.5 mg/hr at 12/31/22 0549    albuterol-ipratropium (DUO-NEB) 2.5 MG-0.5 MG/3 ML, 3 mL, Nebulization, Q6H RT, Walker Weeks MD, 3 mL at 12/31/22 0743    DOBUTamine (DOBUTREX) 500 mg/250 mL (2,000 mcg/mL) infusion, 3 mcg/kg/min, IntraVENous, CONTINUOUS, Manpreet Santamaria MD, Last Rate: 5.1 mL/hr at 12/30/22 2116, 3 mcg/kg/min at 12/30/22 2116    balsam peru-castor oiL (VENELEX) ointment, , Topical, BID, Becky Villagran MD, Given at 12/31/22 0817    alteplase (CATHFLO) 1 mg in sterile water (preservative free) 1 mL injection, 1 mg, InterCATHeter, PRN, Kobe MOLINA MD    bacitracin 500 unit/gram packet 1 Packet, 1 Packet, Topical, PRN, Kobe MOLINA MD    glucose chewable tablet 16 g, 4 Tablet, Oral, PRN, Kobe MOLINA MD    glucagon (GLUCAGEN) injection 1 mg, 1 mg, IntraMUSCular, PRN, Sylvie Rushing MD    dextrose 10 % infusion 0-250 mL, 0-250 mL, IntraVENous, PRN, Walker Weeks MD    insulin lispro (HUMALOG) injection, , SubCUTAneous, AC&HS, Kacy Gutierrez DO, 7 Units at 12/31/22 0759    insulin lispro (HUMALOG) injection 3 Units, 0.05 Units/kg, SubCUTAneous, TID WITH MEALS, Kobe MOLINA MD, 3 Units at 12/31/22 0759    senna-docusate (PERICOLACE) 8.6-50 mg per tablet 1 Tablet, 1 Tablet, Oral, BID PRN, Sylvie Rushing MD, 1 Tablet at 12/26/22 6812    bisacodyL (DULCOLAX) suppository 10 mg, 10 mg, Rectal, QHS PRN, Walker Weeks MD    sodium chloride (NS) flush 5-40 mL, 5-40 mL, IntraVENous, Q8H, Walker Aponte MD, 10 mL at 12/31/22 0549    sodium chloride (NS) flush 5-40 mL, 5-40 mL, IntraVENous, PRN, Jean Alcantara MD, 10 mL at 12/28/22 0845    acetaminophen (TYLENOL) tablet 650 mg, 650 mg, Oral, Q6H PRN, 650 mg at 12/26/22 1714 **OR** acetaminophen (TYLENOL) suppository 650 mg, 650 mg, Rectal, Q6H PRN, Walker Alonso MD    polyethylene glycol (MIRALAX) packet 17 g, 17 g, Oral, DAILY PRN, Leandra MOLINA MD, 17 g at 12/26/22 0649    ondansetron (ZOFRAN ODT) tablet 4 mg, 4 mg, Oral, Q8H PRN **OR** ondansetron (ZOFRAN) injection 4 mg, 4 mg, IntraVENous, Q6H PRN, Leandra MOLINA MD, 4 mg at 12/24/22 0849    aspirin delayed-release tablet 81 mg, 81 mg, Oral, DAILY, Ranjit Pearson MD, 81 mg at 12/31/22 0808    ipratropium (ATROVENT) 21 mcg (0.03 %) nasal spray 2 Spray, 2 Spray, Both Nostrils, Q12H, Cheng Pearson MD, 2 [place] at 12/31/22 0816    tamsulosin (FLOMAX) capsule 0.4 mg, 0.4 mg, Oral, DAILY, Ranjit Pearson MD, 0.4 mg at 12/31/22 0808    sodium chloride (NS) flush 5-40 mL, 5-40 mL, IntraVENous, PRN, MD Marianela Daniels NP  [facility] [address] [place] [address] 13, 035 [facility] [address]  [facility]  [phone]      Darrius Copeland MD

## 2022-12-31 NOTE — PROGRESS NOTES
0730- Report obtained from [facility].  1000- Patient up to the chair. 1115- Patient has not voided. Bladder scan now shows >600 cc's of urine. 1130- Straight cath now obtains 600 cc's of urine. 1140- Blood sugar now 416. Dr. Dennie Campanile notified. Orders for 15 units of insulin. 1615- Patient able to void 50 cc's now. Bladder scan shows >760 cc's. Straight cath performed with 700 cc's out. 1930- Uneventful evening. Report given to [facility].

## 2022-12-31 NOTE — PROGRESS NOTES
[address] [facility]  Hospitalist Group                                                                                          Hospitalist Progress Note  Cortney Montenegro MD  Answering service: [phone] OR [phone] from in house phone        Date of Service:  2022  NAME:  Pollo Muro  :  1950  MRN:  [id number]      Admission Summary:   Pollo Muro is a 71 y.o. male who presents with progressively worsening shortness of breath for past several days. It has gotten worse to the point that he can only ambulate a few steps due to severe dyspnea and near syncope. Patient also noted abdominal distention which is increasing. Patient with known history of cardiomyopathy and recently admitted for CHF exacerbation a week ago. On presentation to the ER, chest x-ray shows diffuse airspace disease atypical infection versus edema. Also patient was noted to have elevated troponin and BNP. Patient was further evaluated by CT abdomen and pelvis which shows massively distended bladder with some bilateral hydronephrosis, subsequently Vasquez was placed. Patient also with significant leukocytosis as well as tachycardic and elevated lactic acid level and subsequently code sepsis was called. Hospitalist service requested admit the patient for further evaluation management. Interval history / Subjective:   Breathing is about the same, tired on my exam and drifting back to sleep. Assessment & Plan:      Acute on chronic systolic heart failure exacerbation-EF 25%  Concern for myocarditis  Cardiogenic shock-ongoing  Lactic acidosis  Coronary direct artery disease CAD status post CABG in 2018, PCI  -Cardiology and advanced heart failure following, per their notation suggestion of alternative mechanism to ischemia. 3  -Continue IV Bumex, IV dobutamine  -Patient okay to transfer to stepdown unit  - Unable to tolerate GDMT due to hypotension  -Viral and immune panel ordered through advanced heart failure services  -Continue hydrocortisone, for presumed myocarditis. Will need to discuss with cardiology regarding taper  - planning for cardiac MRI next week      Acute kidney injury on CKD stage III-improving  -Continue Bumex gtt. -Nephrology following  -I's and O's     Type 2 diabetes -A1c 6.5%  -Continue 40 of Lantus, sliding scale insulin     Insomnia, hospital delirium-continue Seroquel at bedtime, would consider DC Benadryl given his age will discuss with patient tomorrow. Hypokalemia-continue potassium supplementation, check daily  BPH-continue tamsulosin, donnelly to be removed nad void trial. If fails will need o/p urology follow up   PAD, status post right SFA PTCA, follow-up with Dr. Dakota Corbett in 2 to 3 months          Code status: DNR  Prophylaxis: heparin   Care Plan discussed with: pt, RN   Anticipated Disposition: Greater than 48 hours      Hospital Problems  Date Reviewed: 12/5/2022            Codes Class Noted POA    Systolic [facility] ICD-10-CM: I50.23  ICD-9-CM: 428.23, 428.0  12/29/2022 Yes        Receiving inotropic medication ICD-10-CM: Z79.899  ICD-9-CM: V49.89  12/29/2022 Unknown        Sepsis ([facility] ICD-10-CM: A41.9  ICD-9-CM: 038.9, 995.91  12/22/2022 Unknown             Review of Systems:   A comprehensive review of systems was negative except for that written in the HPI. Vital Signs:    Last 24hrs VS reviewed since prior progress note.  Most recent are:  Visit Vitals  /63   Pulse 94   Temp 98 °F (36.7 °C)   Resp 17   Ht 5' 9\" (1.753 m)   Wt 49.9 kg (110 lb 0.2 oz)   SpO2 99%   BMI 16.25 kg/m²         Intake/Output Summary (Last 24 hours) at 12/31/2022 1507  Last data filed at 12/31/2022 1323  Gross per 24 hour   Intake 500.27 ml   Output 2840 ml   Net -2339.73 ml        Physical Examination:     I had a face to face encounter with this patient and independently examined them on 12/31/2022 as outlined below:          Constitutional:  No acute distress, cooperative, pleasant, frail elderly    ENT:  Oral mucosa dry  oropharynx benign. Resp:  CTA bilaterally. No wheezing/rhonchi/rales. No accessory muscle use. CV:  Regular rhythm, normal rate, no murmurs, gallops, rubs    GI:  Soft, non distended, non tender. normoactive bowel sounds, no hepatosplenomegaly     Musculoskeletal:  No edema, warm, 2+ pulses throughout    Neurologic:  Moves all extremities. Slpeey, CN II-XII reviewed            Data Review:    Review and/or order of clinical lab test  Review and/or order of tests in the radiology section of CPT  Review and/or order of tests in the medicine section of CPT      Labs:     Recent Labs     12/31/22 0314 12/30/22 0314   WBC 13.6* 11.0   HGB 9.7* 9.2*   HCT 33.0* 30.7*    325     Recent Labs     12/31/22 0314 12/31/22  0306 12/30/22 0314 12/29/22  1629 12/29/22  0411   *  --  134* 134* 135*   K 3.6  --  3.4* 3.6 3.4*   CL 97  --  95* 92* 93*   CO2 26  --  27 30 28   *  --  94* 96* 87*   CREA 2.30*  --  2.47* 2.66* 2.65*   *  --  186* 124* 126*   CA 10.5* 10.7* 10.5* 10.7* 10.9*   MG 2.8*  --  2.8*  --  2.9*   PHOS 3.8  --  5.4*  --  5.4*     Recent Labs     12/31/22 0314 12/30/22 0314 12/29/22  1629   * 252* 300*   * 167* 162*   TBILI 0.6 0.7 0.7   TP 8.0 7.9 7.7   ALB 4.4 4.4 4.4   GLOB 3.6 3.5 3.3     No results for input(s): INR, PTP, APTT, INREXT in the last 72 hours. No results for input(s): FE, TIBC, PSAT, FERR in the last 72 hours. Lab Results   Component Value Date/Time    Folate 10.5 07/03/2018 09:24 AM      No results for input(s): PH, PCO2, PO2 in the last 72 hours. No results for input(s): CPK, CKNDX, TROIQ in the last 72 hours.     No lab exists for component: CPKMB  Lab Results   Component Value Date/Time    Cholesterol, total 143 10/12/2022 09:10 AM    HDL Cholesterol 49 10/12/2022 09:10 AM    LDL, calculated 41.4 10/12/2022 09:10 AM    Triglyceride 263 (H) 10/12/2022 09:10 AM CHOL/HDL Ratio 2.9 10/12/2022 09:10 AM     Lab Results   Component Value Date/Time    Glucose (POC) 416 (H) 12/31/2022 11:30 AM    Glucose (POC) 252 (H) 12/31/2022 07:52 AM    Glucose (POC) 346 (H) 12/30/2022 09:06 PM    Glucose (POC) 341 (H) 12/30/2022 05:27 PM    Glucose (POC) 355 (H) 12/30/2022 11:55 AM     Lab Results   Component Value Date/Time    Color YELLOW/STRAW 12/24/2022 03:32 PM    Appearance CLOUDY (A) 12/24/2022 03:32 PM    Specific gravity 1.016 12/24/2022 03:32 PM    Specific gravity 1.020 05/03/2014 08:18 AM    pH (UA) 5.0 12/24/2022 03:32 PM    Protein 100 (A) 12/24/2022 03:32 PM    Glucose 500 (A) 12/24/2022 03:32 PM    Ketone Negative 12/24/2022 03:32 PM    Bilirubin Negative 12/24/2022 03:32 PM    Urobilinogen 1.0 12/24/2022 03:32 PM    Nitrites Negative 12/24/2022 03:32 PM    Leukocyte Esterase MODERATE (A) 12/24/2022 03:32 PM    Epithelial cells FEW 12/24/2022 03:32 PM    Bacteria 2+ (A) 12/24/2022 03:32 PM    WBC 5-10 12/24/2022 03:32 PM    RBC 5-10 12/24/2022 03:32 PM         Medications Reviewed:     Current Facility-Administered Medications   Medication Dose Route Frequency    clopidogreL (PLAVIX) tablet 75 mg  75 mg Oral DAILY    [START ON 1/1/2023] bumetanide (BUMEX) injection 2 mg  2 mg IntraVENous Q12H    potassium chloride SR (KLOR-CON 10) tablet 40 mEq  40 mEq Oral BID    insulin glargine (LANTUS) injection 40 Units  40 Units SubCUTAneous DAILY    pantoprazole (PROTONIX) tablet 40 mg  40 mg Oral ACB    diphenhydrAMINE (BENADRYL) capsule 50 mg  50 mg Oral QHS    hydrocortisone Sod Succ (PF) (SOLU-CORTEF) injection 50 mg  50 mg IntraVENous Q12H    heparin (porcine) injection 5,000 Units  5,000 Units SubCUTAneous Q12H    QUEtiapine (SEROquel) tablet 50 mg  50 mg Oral QHS    lidocaine 4 % patch 1 Patch  1 Patch TransDERmal Q24H    albuterol-ipratropium (DUO-NEB) 2.5 MG-0.5 MG/3 ML  3 mL Nebulization Q6H RT    DOBUTamine (DOBUTREX) 500 mg/250 mL (2,000 mcg/mL) infusion  3 mcg/kg/min IntraVENous CONTINUOUS    balsam peru-castor oiL (VENELEX) ointment   Topical BID    alteplase (CATHFLO) 1 mg in sterile water (preservative free) 1 mL injection  1 mg InterCATHeter PRN    bacitracin 500 unit/gram packet 1 Packet  1 Packet Topical PRN    glucose chewable tablet 16 g  4 Tablet Oral PRN    glucagon (GLUCAGEN) injection 1 mg  1 mg IntraMUSCular PRN    dextrose 10 % infusion 0-250 mL  0-250 mL IntraVENous PRN    insulin lispro (HUMALOG) injection   SubCUTAneous AC&HS    insulin lispro (HUMALOG) injection 3 Units  0.05 Units/kg SubCUTAneous TID WITH MEALS    senna-docusate (PERICOLACE) 8.6-50 mg per tablet 1 Tablet  1 Tablet Oral BID PRN    bisacodyL (DULCOLAX) suppository 10 mg  10 mg Rectal QHS PRN    sodium chloride (NS) flush 5-40 mL  5-40 mL IntraVENous Q8H    sodium chloride (NS) flush 5-40 mL  5-40 mL IntraVENous PRN    acetaminophen (TYLENOL) tablet 650 mg  650 mg Oral Q6H PRN    Or    acetaminophen (TYLENOL) suppository 650 mg  650 mg Rectal Q6H PRN    polyethylene glycol (MIRALAX) packet 17 g  17 g Oral DAILY PRN    ondansetron (ZOFRAN ODT) tablet 4 mg  4 mg Oral Q8H PRN    Or    ondansetron (ZOFRAN) injection 4 mg  4 mg IntraVENous Q6H PRN    aspirin delayed-release tablet 81 mg  81 mg Oral DAILY    ipratropium (ATROVENT) 21 mcg (0.03 %) nasal spray 2 Spray  2 Spray Both Nostrils Q12H    tamsulosin (FLOMAX) capsule 0.4 mg  0.4 mg Oral DAILY    sodium chloride (NS) flush 5-40 mL  5-40 mL IntraVENous PRN     ______________________________________________________________________  EXPECTED LENGTH OF STAY: 5d 0h  ACTUAL LENGTH OF STAY:          9                 Tom Salazar MD

## 2022-12-31 NOTE — PROGRESS NOTES
[facility]  PROGRESS NOTE        Subjective:   Resting on rounds    Objective:   VITALS SIGNS:    Visit Vitals  /65   Pulse 90   Temp 98 °F (36.7 °C)   Resp 15   Ht 5' 9\" (1.753 m)   Wt 49.9 kg (110 lb 0.2 oz)   SpO2 96%   BMI 16.25 kg/m²       O2 Device: None (Room air)   O2 Flow Rate (L/min): 2 l/min   Temp (24hrs), Av.2 °F (36.8 °C), Min:97.3 °F (36.3 °C), Max:99.1 °F (37.3 °C)         PHYSICAL EXAM:  No edema  No rales  RRR  NAD  DATA REVIEW:     INTAKE / OUTPUT:   Last shift:       07 - 1900  In: 28.4 [I.V.:28.4]  Out: 600 [Urine:600]  Last 3 shifts: 1901 -  0700  In: 695.6 [P.O.:440; I.V.:255.6]  Out: 4200 [Urine:4200]    Intake/Output Summary (Last 24 hours) at 2022 1231  Last data filed at 2022 1136  Gross per 24 hour   Intake 385.3 ml   Output 3040 ml   Net -2654.7 ml           LABS:   Recent Labs     22  0314 22  0314 22  0411   WBC 13.6* 11.0 10.3   HGB 9.7* 9.2* 9.6*   HCT 33.0* 30.7* 32.1*    325 337       Recent Labs     22  0314 22  0306 22  0314 22  1629 22  0411   *  --  134* 134* 135*   K 3.6  --  3.4* 3.6 3.4*   CL 97  --  95* 92* 93*   CO2 26  --  27 30 28   *  --  186* 124* 126*   *  --  94* 96* 87*   CREA 2.30*  --  2.47* 2.66* 2.65*   CA 10.5* 10.7* 10.5* 10.7* 10.9*   MG 2.8*  --  2.8*  --  2.9*   PHOS 3.8  --  5.4*  --  5.4*   ALB 4.4  --  4.4 4.4 4.6   TBILI 0.6  --  0.7 0.7 0.6   *  --  252* 300* 377*             Assessment:   CKD-3b  TANNER,in the setting of 1-urinary retention 2-poor hemodynamics 3-low EF at risk for CRS            Non oliguric,creat slightly better  Urinary retention/hydro s/p donnelly  Hyponatremia better  hypokalemia  Cardiomyopathy/low EF  Anemia    Hypercalcemia started on high dose po vit D on     CXR with resolution of pulm edema  Stop bumex gtt=transition to bumex q12 tomorrow  On   Creatinine 2.3-bun> 100  OFF vit D  On oral k replacement  SPEP pending,check FLC ,  PTH 67  MRI/cardiac at [facility] on Ashish Roman MD

## 2023-01-01 ENCOUNTER — APPOINTMENT (OUTPATIENT)
Dept: GENERAL RADIOLOGY | Age: 73
End: 2023-01-01
Attending: INTERNAL MEDICINE
Payer: MEDICARE

## 2023-01-01 ENCOUNTER — APPOINTMENT (OUTPATIENT)
Dept: GENERAL RADIOLOGY | Age: 73
End: 2023-01-01
Attending: STUDENT IN AN ORGANIZED HEALTH CARE EDUCATION/TRAINING PROGRAM
Payer: MEDICARE

## 2023-01-01 ENCOUNTER — APPOINTMENT (OUTPATIENT)
Dept: GENERAL RADIOLOGY | Age: 73
End: 2023-01-01
Attending: NURSE PRACTITIONER
Payer: MEDICARE

## 2023-01-01 ENCOUNTER — APPOINTMENT (OUTPATIENT)
Dept: GENERAL RADIOLOGY | Age: 73
End: 2023-01-01
Attending: ANESTHESIOLOGY
Payer: MEDICARE

## 2023-01-01 ENCOUNTER — APPOINTMENT (OUTPATIENT)
Dept: NON INVASIVE DIAGNOSTICS | Age: 73
End: 2023-01-01
Attending: NURSE PRACTITIONER
Payer: MEDICARE

## 2023-01-01 ENCOUNTER — HOSPITAL ENCOUNTER (INPATIENT)
Dept: CT IMAGING | Age: 73
Discharge: HOME | End: 2023-01-28
Attending: INTERNAL MEDICINE
Payer: MEDICARE

## 2023-01-01 ENCOUNTER — HOSPITAL ENCOUNTER (INPATIENT)
Age: 73
LOS: 58 days | End: 2023-03-25
Attending: EMERGENCY MEDICINE | Admitting: STUDENT IN AN ORGANIZED HEALTH CARE EDUCATION/TRAINING PROGRAM
Payer: MEDICARE

## 2023-01-01 ENCOUNTER — APPOINTMENT (OUTPATIENT)
Dept: VASCULAR SURGERY | Age: 73
End: 2023-01-01
Attending: STUDENT IN AN ORGANIZED HEALTH CARE EDUCATION/TRAINING PROGRAM
Payer: MEDICARE

## 2023-01-01 ENCOUNTER — APPOINTMENT (OUTPATIENT)
Dept: CT IMAGING | Age: 73
End: 2023-01-01
Attending: NURSE PRACTITIONER
Payer: MEDICARE

## 2023-01-01 ENCOUNTER — HOSPITAL ENCOUNTER (INPATIENT)
Dept: CT IMAGING | Age: 73
Discharge: HOME | End: 2023-03-05
Attending: STUDENT IN AN ORGANIZED HEALTH CARE EDUCATION/TRAINING PROGRAM
Payer: MEDICARE

## 2023-01-01 ENCOUNTER — APPOINTMENT (OUTPATIENT)
Dept: ULTRASOUND IMAGING | Age: 73
End: 2023-01-01
Attending: STUDENT IN AN ORGANIZED HEALTH CARE EDUCATION/TRAINING PROGRAM
Payer: MEDICARE

## 2023-01-01 ENCOUNTER — APPOINTMENT (OUTPATIENT)
Dept: CT IMAGING | Age: 73
End: 2023-01-01
Attending: SURGERY
Payer: MEDICARE

## 2023-01-01 ENCOUNTER — APPOINTMENT (OUTPATIENT)
Dept: CT IMAGING | Age: 73
End: 2023-01-01
Attending: STUDENT IN AN ORGANIZED HEALTH CARE EDUCATION/TRAINING PROGRAM
Payer: MEDICARE

## 2023-01-01 ENCOUNTER — DOCUMENTATION ONLY (OUTPATIENT)
Dept: CARDIOLOGY CLINIC | Age: 73
End: 2023-01-01

## 2023-01-01 ENCOUNTER — APPOINTMENT (OUTPATIENT)
Dept: GENERAL RADIOLOGY | Age: 73
End: 2023-01-01
Attending: THORACIC SURGERY (CARDIOTHORACIC VASCULAR SURGERY)
Payer: MEDICARE

## 2023-01-01 ENCOUNTER — APPOINTMENT (OUTPATIENT)
Dept: ULTRASOUND IMAGING | Age: 73
End: 2023-01-01
Payer: MEDICARE

## 2023-01-01 ENCOUNTER — APPOINTMENT (OUTPATIENT)
Dept: GENERAL RADIOLOGY | Age: 73
DRG: 871 | End: 2023-01-01
Attending: INTERNAL MEDICINE
Payer: MEDICARE

## 2023-01-01 VITALS
HEIGHT: 66 IN | DIASTOLIC BLOOD PRESSURE: 74 MMHG | OXYGEN SATURATION: 97 % | SYSTOLIC BLOOD PRESSURE: 90 MMHG | BODY MASS INDEX: 21.86 KG/M2 | WEIGHT: 136.02 LBS | TEMPERATURE: 98.3 F | HEART RATE: 72 BPM | RESPIRATION RATE: 23 BRPM

## 2023-01-01 DIAGNOSIS — R74.8 ELEVATED LIVER ENZYMES: ICD-10-CM

## 2023-01-01 DIAGNOSIS — R09.02 HYPOXIA: ICD-10-CM

## 2023-01-01 DIAGNOSIS — I50.43 ACUTE ON CHRONIC COMBINED SYSTOLIC AND DIASTOLIC CONGESTIVE HEART FAILURE (HCC): Primary | ICD-10-CM

## 2023-01-01 DIAGNOSIS — Z95.1 S/P CABG X 3: ICD-10-CM

## 2023-01-01 DIAGNOSIS — I50.810 RVF (RIGHT VENTRICULAR FAILURE) (HCC): ICD-10-CM

## 2023-01-01 DIAGNOSIS — Z79.899 RECEIVING INOTROPIC MEDICATION: ICD-10-CM

## 2023-01-01 DIAGNOSIS — K72.00 SHOCK LIVER: ICD-10-CM

## 2023-01-01 DIAGNOSIS — R41.82 ALTERED MENTAL STATUS, UNSPECIFIED ALTERED MENTAL STATUS TYPE: ICD-10-CM

## 2023-01-01 DIAGNOSIS — R53.83 FATIGUE, UNSPECIFIED TYPE: ICD-10-CM

## 2023-01-01 DIAGNOSIS — Z79.4 TYPE 2 DIABETES MELLITUS WITH HYPERGLYCEMIA, WITH LONG-TERM CURRENT USE OF INSULIN (HCC): ICD-10-CM

## 2023-01-01 DIAGNOSIS — I50.82 BIVENTRICULAR CONGESTIVE HEART FAILURE (HCC): ICD-10-CM

## 2023-01-01 DIAGNOSIS — E11.65 TYPE 2 DIABETES MELLITUS WITH HYPERGLYCEMIA, WITH LONG-TERM CURRENT USE OF INSULIN (HCC): ICD-10-CM

## 2023-01-01 DIAGNOSIS — Z71.89 ADVANCED CARE PLANNING/COUNSELING DISCUSSION: ICD-10-CM

## 2023-01-01 DIAGNOSIS — Z71.89 GOALS OF CARE, COUNSELING/DISCUSSION: ICD-10-CM

## 2023-01-01 DIAGNOSIS — I10 HYPERTENSION, ESSENTIAL: ICD-10-CM

## 2023-01-01 DIAGNOSIS — I25.10 CORONARY ARTERY DISEASE INVOLVING NATIVE CORONARY ARTERY OF NATIVE HEART WITHOUT ANGINA PECTORIS: ICD-10-CM

## 2023-01-01 DIAGNOSIS — I50.84 END STAGE HEART FAILURE (HCC): ICD-10-CM

## 2023-01-01 DIAGNOSIS — Z51.81 ANTICOAGULATION MANAGEMENT ENCOUNTER: ICD-10-CM

## 2023-01-01 DIAGNOSIS — K76.1 CHRONIC PASSIVE HEPATIC CONGESTION: ICD-10-CM

## 2023-01-01 DIAGNOSIS — E78.5 DYSLIPIDEMIA, GOAL LDL BELOW 70: ICD-10-CM

## 2023-01-01 DIAGNOSIS — K85.90 ACUTE PANCREATITIS, UNSPECIFIED COMPLICATION STATUS, UNSPECIFIED PANCREATITIS TYPE: ICD-10-CM

## 2023-01-01 DIAGNOSIS — Z79.01 ANTICOAGULATION MANAGEMENT ENCOUNTER: ICD-10-CM

## 2023-01-01 DIAGNOSIS — E11.65 TYPE 2 DIABETES MELLITUS WITH HYPERGLYCEMIA, WITHOUT LONG-TERM CURRENT USE OF INSULIN (HCC): ICD-10-CM

## 2023-01-01 DIAGNOSIS — I47.1 SVT (SUPRAVENTRICULAR TACHYCARDIA) (HCC): ICD-10-CM

## 2023-01-01 DIAGNOSIS — Z51.5 PALLIATIVE CARE ENCOUNTER: ICD-10-CM

## 2023-01-01 DIAGNOSIS — Z95.811 LVAD (LEFT VENTRICULAR ASSIST DEVICE) PRESENT (HCC): ICD-10-CM

## 2023-01-01 DIAGNOSIS — R91.8 LUNG MASS: ICD-10-CM

## 2023-01-01 LAB
ABO + RH BLD: NORMAL
ALBUMIN SERPL-MCNC: 2.8 G/DL (ref 3.5–5)
ALBUMIN SERPL-MCNC: 2.9 G/DL (ref 3.5–5)
ALBUMIN SERPL-MCNC: 3 G/DL (ref 3.5–5)
ALBUMIN SERPL-MCNC: 3.1 G/DL (ref 3.5–5)
ALBUMIN SERPL-MCNC: 3.2 G/DL (ref 3.5–5)
ALBUMIN SERPL-MCNC: 3.3 G/DL (ref 3.5–5)
ALBUMIN SERPL-MCNC: 3.4 G/DL (ref 3.5–5)
ALBUMIN SERPL-MCNC: 3.5 G/DL (ref 3.5–5)
ALBUMIN SERPL-MCNC: 3.6 G/DL (ref 3.5–5)
ALBUMIN SERPL-MCNC: 3.7 G/DL (ref 3.5–5)
ALBUMIN SERPL-MCNC: 3.8 G/DL (ref 3.5–5)
ALBUMIN SERPL-MCNC: 3.8 G/DL (ref 3.5–5)
ALBUMIN SERPL-MCNC: 3.9 G/DL (ref 3.5–5)
ALBUMIN SERPL-MCNC: 4 G/DL (ref 3.5–5)
ALBUMIN SERPL-MCNC: 4.1 G/DL (ref 3.5–5)
ALBUMIN SERPL-MCNC: 4.1 G/DL (ref 3.5–5)
ALBUMIN SERPL-MCNC: 4.3 G/DL (ref 3.5–5)
ALBUMIN/GLOB SERPL: 0.8 (ref 1.1–2.2)
ALBUMIN/GLOB SERPL: 0.9 (ref 1.1–2.2)
ALBUMIN/GLOB SERPL: 1 (ref 1.1–2.2)
ALBUMIN/GLOB SERPL: 1.1 (ref 1.1–2.2)
ALBUMIN/GLOB SERPL: 1.2 (ref 1.1–2.2)
ALBUMIN/GLOB SERPL: 1.3 (ref 1.1–2.2)
ALBUMIN/GLOB SERPL: 1.4 (ref 1.1–2.2)
ALBUMIN/GLOB SERPL: 1.6 (ref 1.1–2.2)
ALBUMIN/GLOB SERPL: 1.7 (ref 1.1–2.2)
ALBUMIN/GLOB SERPL: 1.8 (ref 1.1–2.2)
ALBUMIN/GLOB SERPL: 1.9 (ref 1.1–2.2)
ALBUMIN/GLOB SERPL: 1.9 (ref 1.1–2.2)
ALBUMIN/GLOB SERPL: 2 (ref 1.1–2.2)
ALBUMIN/GLOB SERPL: 2.1 (ref 1.1–2.2)
ALBUMIN/GLOB SERPL: 2.2 (ref 1.1–2.2)
ALBUMIN/GLOB SERPL: 2.5 (ref 1.1–2.2)
ALP SERPL-CCNC: 102 U/L (ref 45–117)
ALP SERPL-CCNC: 103 U/L (ref 45–117)
ALP SERPL-CCNC: 103 U/L (ref 45–117)
ALP SERPL-CCNC: 108 U/L (ref 45–117)
ALP SERPL-CCNC: 109 U/L (ref 45–117)
ALP SERPL-CCNC: 112 U/L (ref 45–117)
ALP SERPL-CCNC: 112 U/L (ref 45–117)
ALP SERPL-CCNC: 113 U/L (ref 45–117)
ALP SERPL-CCNC: 114 U/L (ref 45–117)
ALP SERPL-CCNC: 118 U/L (ref 45–117)
ALP SERPL-CCNC: 121 U/L (ref 45–117)
ALP SERPL-CCNC: 122 U/L (ref 45–117)
ALP SERPL-CCNC: 128 U/L (ref 45–117)
ALP SERPL-CCNC: 132 U/L (ref 45–117)
ALP SERPL-CCNC: 134 U/L (ref 45–117)
ALP SERPL-CCNC: 142 U/L (ref 45–117)
ALP SERPL-CCNC: 146 U/L (ref 45–117)
ALP SERPL-CCNC: 156 U/L (ref 45–117)
ALP SERPL-CCNC: 158 U/L (ref 45–117)
ALP SERPL-CCNC: 164 U/L (ref 45–117)
ALP SERPL-CCNC: 180 U/L (ref 45–117)
ALP SERPL-CCNC: 181 U/L (ref 45–117)
ALP SERPL-CCNC: 235 U/L (ref 45–117)
ALP SERPL-CCNC: 239 U/L (ref 45–117)
ALP SERPL-CCNC: 269 U/L (ref 45–117)
ALP SERPL-CCNC: 288 U/L (ref 45–117)
ALP SERPL-CCNC: 321 U/L (ref 45–117)
ALP SERPL-CCNC: 330 U/L (ref 45–117)
ALP SERPL-CCNC: 361 U/L (ref 45–117)
ALP SERPL-CCNC: 371 U/L (ref 45–117)
ALP SERPL-CCNC: 378 U/L (ref 45–117)
ALP SERPL-CCNC: 379 U/L (ref 45–117)
ALP SERPL-CCNC: 379 U/L (ref 45–117)
ALP SERPL-CCNC: 38 U/L (ref 45–117)
ALP SERPL-CCNC: 380 U/L (ref 45–117)
ALP SERPL-CCNC: 388 U/L (ref 45–117)
ALP SERPL-CCNC: 388 U/L (ref 45–117)
ALP SERPL-CCNC: 394 U/L (ref 45–117)
ALP SERPL-CCNC: 42 U/L (ref 45–117)
ALP SERPL-CCNC: 42 U/L (ref 45–117)
ALP SERPL-CCNC: 43 U/L (ref 45–117)
ALP SERPL-CCNC: 432 U/L (ref 45–117)
ALP SERPL-CCNC: 44 U/L (ref 45–117)
ALP SERPL-CCNC: 50 U/L (ref 45–117)
ALP SERPL-CCNC: 51 U/L (ref 45–117)
ALP SERPL-CCNC: 52 U/L (ref 45–117)
ALP SERPL-CCNC: 55 U/L (ref 45–117)
ALP SERPL-CCNC: 56 U/L (ref 45–117)
ALP SERPL-CCNC: 68 U/L (ref 45–117)
ALP SERPL-CCNC: 74 U/L (ref 45–117)
ALP SERPL-CCNC: 89 U/L (ref 45–117)
ALP SERPL-CCNC: 92 U/L (ref 45–117)
ALP SERPL-CCNC: 92 U/L (ref 45–117)
ALP SERPL-CCNC: 95 U/L (ref 45–117)
ALP SERPL-CCNC: 96 U/L (ref 45–117)
ALP SERPL-CCNC: 97 U/L (ref 45–117)
ALP SERPL-CCNC: 97 U/L (ref 45–117)
ALP SERPL-CCNC: 98 U/L (ref 45–117)
ALP SERPL-CCNC: 98 U/L (ref 45–117)
ALP SERPL-CCNC: 99 U/L (ref 45–117)
ALT SERPL-CCNC: 104 U/L (ref 12–78)
ALT SERPL-CCNC: 105 U/L (ref 12–78)
ALT SERPL-CCNC: 107 U/L (ref 12–78)
ALT SERPL-CCNC: 109 U/L (ref 12–78)
ALT SERPL-CCNC: 110 U/L (ref 12–78)
ALT SERPL-CCNC: 112 U/L (ref 12–78)
ALT SERPL-CCNC: 113 U/L (ref 12–78)
ALT SERPL-CCNC: 113 U/L (ref 12–78)
ALT SERPL-CCNC: 114 U/L (ref 12–78)
ALT SERPL-CCNC: 12 U/L (ref 12–78)
ALT SERPL-CCNC: 120 U/L (ref 12–78)
ALT SERPL-CCNC: 123 U/L (ref 12–78)
ALT SERPL-CCNC: 123 U/L (ref 12–78)
ALT SERPL-CCNC: 129 U/L (ref 12–78)
ALT SERPL-CCNC: 14 U/L (ref 12–78)
ALT SERPL-CCNC: 140 U/L (ref 12–78)
ALT SERPL-CCNC: 145 U/L (ref 12–78)
ALT SERPL-CCNC: 146 U/L (ref 12–78)
ALT SERPL-CCNC: 15 U/L (ref 12–78)
ALT SERPL-CCNC: 16 U/L (ref 12–78)
ALT SERPL-CCNC: 16 U/L (ref 12–78)
ALT SERPL-CCNC: 161 U/L (ref 12–78)
ALT SERPL-CCNC: 165 U/L (ref 12–78)
ALT SERPL-CCNC: 173 U/L (ref 12–78)
ALT SERPL-CCNC: 180 U/L (ref 12–78)
ALT SERPL-CCNC: 20 U/L (ref 12–78)
ALT SERPL-CCNC: 21 U/L (ref 12–78)
ALT SERPL-CCNC: 22 U/L (ref 12–78)
ALT SERPL-CCNC: 22 U/L (ref 12–78)
ALT SERPL-CCNC: 23 U/L (ref 12–78)
ALT SERPL-CCNC: 24 U/L (ref 12–78)
ALT SERPL-CCNC: 24 U/L (ref 12–78)
ALT SERPL-CCNC: 25 U/L (ref 12–78)
ALT SERPL-CCNC: 25 U/L (ref 12–78)
ALT SERPL-CCNC: 26 U/L (ref 12–78)
ALT SERPL-CCNC: 27 U/L (ref 12–78)
ALT SERPL-CCNC: 31 U/L (ref 12–78)
ALT SERPL-CCNC: 32 U/L (ref 12–78)
ALT SERPL-CCNC: 37 U/L (ref 12–78)
ALT SERPL-CCNC: 50 U/L (ref 12–78)
ALT SERPL-CCNC: 52 U/L (ref 12–78)
ALT SERPL-CCNC: 54 U/L (ref 12–78)
ALT SERPL-CCNC: 57 U/L (ref 12–78)
ALT SERPL-CCNC: 61 U/L (ref 12–78)
ALT SERPL-CCNC: 74 U/L (ref 12–78)
ALT SERPL-CCNC: 78 U/L (ref 12–78)
ALT SERPL-CCNC: 81 U/L (ref 12–78)
ALT SERPL-CCNC: 82 U/L (ref 12–78)
ALT SERPL-CCNC: 86 U/L (ref 12–78)
ALT SERPL-CCNC: 92 U/L (ref 12–78)
ALT SERPL-CCNC: 93 U/L (ref 12–78)
ALT SERPL-CCNC: 99 U/L (ref 12–78)
ALT SERPL-CCNC: 99 U/L (ref 12–78)
AMMONIA PLAS-SCNC: 25 UMOL/L
AMMONIA PLAS-SCNC: <10 UMOL/L
AMPHET UR QL SCN: NEGATIVE
ANION GAP BLD CALC-SCNC: 13 (ref 10–20)
ANION GAP SERPL CALC-SCNC: 10 MMOL/L (ref 5–15)
ANION GAP SERPL CALC-SCNC: 11 MMOL/L (ref 5–15)
ANION GAP SERPL CALC-SCNC: 12 MMOL/L (ref 5–15)
ANION GAP SERPL CALC-SCNC: 14 MMOL/L (ref 5–15)
ANION GAP SERPL CALC-SCNC: 15 MMOL/L (ref 5–15)
ANION GAP SERPL CALC-SCNC: 16 MMOL/L (ref 5–15)
ANION GAP SERPL CALC-SCNC: 17 MMOL/L (ref 5–15)
ANION GAP SERPL CALC-SCNC: 3 MMOL/L (ref 5–15)
ANION GAP SERPL CALC-SCNC: 3 MMOL/L (ref 5–15)
ANION GAP SERPL CALC-SCNC: 4 MMOL/L (ref 5–15)
ANION GAP SERPL CALC-SCNC: 5 MMOL/L (ref 5–15)
ANION GAP SERPL CALC-SCNC: 6 MMOL/L (ref 5–15)
ANION GAP SERPL CALC-SCNC: 7 MMOL/L (ref 5–15)
ANION GAP SERPL CALC-SCNC: 8 MMOL/L (ref 5–15)
ANION GAP SERPL CALC-SCNC: 9 MMOL/L (ref 5–15)
ANTI-COMPLEMENT (C3B,C3D): NORMAL
APPEARANCE UR: ABNORMAL
APPEARANCE UR: CLEAR
APPEARANCE UR: CLEAR
APTT PPP: 100.6 SEC (ref 22.1–31)
APTT PPP: 32.8 SEC (ref 22.1–31)
APTT PPP: 33.4 SEC (ref 22.1–31)
APTT PPP: 33.9 SEC (ref 22.1–31)
APTT PPP: 38.3 SEC (ref 22.1–31)
APTT PPP: 40.4 SEC (ref 22.1–31)
APTT PPP: 42.1 SEC (ref 22.1–31)
APTT PPP: 43.6 SEC (ref 22.1–31)
APTT PPP: 44.9 SEC (ref 22.1–31)
APTT PPP: 45.2 SEC (ref 22.1–31)
APTT PPP: 46.5 SEC (ref 22.1–31)
APTT PPP: 49.5 SEC (ref 22.1–31)
APTT PPP: 51.7 SEC (ref 22.1–31)
APTT PPP: 52 SEC (ref 22.1–31)
APTT PPP: 53 SEC (ref 22.1–31)
APTT PPP: 55.1 SEC (ref 22.1–31)
APTT PPP: 55.6 SEC (ref 22.1–31)
APTT PPP: 56.8 SEC (ref 22.1–31)
APTT PPP: 57 SEC (ref 22.1–31)
APTT PPP: 57.8 SEC (ref 22.1–31)
APTT PPP: 58.9 SEC (ref 22.1–31)
APTT PPP: 60.6 SEC (ref 22.1–31)
APTT PPP: 61.4 SEC (ref 22.1–31)
APTT PPP: 61.6 SEC (ref 22.1–31)
APTT PPP: 64.9 SEC (ref 22.1–31)
APTT PPP: 66.6 SEC (ref 22.1–31)
APTT PPP: 67 SEC (ref 22.1–31)
APTT PPP: 67.1 SEC (ref 22.1–31)
APTT PPP: 67.8 SEC (ref 22.1–31)
APTT PPP: 67.8 SEC (ref 22.1–31)
APTT PPP: 68.3 SEC (ref 22.1–31)
APTT PPP: 68.6 SEC (ref 22.1–31)
APTT PPP: 68.9 SEC (ref 22.1–31)
APTT PPP: 73.6 SEC (ref 22.1–31)
APTT PPP: 75.1 SEC (ref 22.1–31)
APTT PPP: 76.2 SEC (ref 22.1–31)
APTT PPP: 79.1 SEC (ref 22.1–31)
APTT PPP: 79.8 SEC (ref 22.1–31)
APTT PPP: 82.6 SEC (ref 22.1–31)
APTT PPP: 82.8 SEC (ref 22.1–31)
APTT PPP: 84.4 SEC (ref 22.1–31)
APTT PPP: 85.1 SEC (ref 22.1–31)
APTT PPP: 85.3 SEC (ref 22.1–31)
APTT PPP: 89.7 SEC (ref 22.1–31)
APTT PPP: >130 SEC (ref 22.1–31)
ARTERIAL PATENCY WRIST A: ABNORMAL
AST SERPL-CCNC: 10 U/L (ref 15–37)
AST SERPL-CCNC: 102 U/L (ref 15–37)
AST SERPL-CCNC: 105 U/L (ref 15–37)
AST SERPL-CCNC: 109 U/L (ref 15–37)
AST SERPL-CCNC: 11 U/L (ref 15–37)
AST SERPL-CCNC: 11 U/L (ref 15–37)
AST SERPL-CCNC: 12 U/L (ref 15–37)
AST SERPL-CCNC: 130 U/L (ref 15–37)
AST SERPL-CCNC: 136 U/L (ref 15–37)
AST SERPL-CCNC: 14 U/L (ref 15–37)
AST SERPL-CCNC: 157 U/L (ref 15–37)
AST SERPL-CCNC: 16 U/L (ref 15–37)
AST SERPL-CCNC: 162 U/L (ref 15–37)
AST SERPL-CCNC: 17 U/L (ref 15–37)
AST SERPL-CCNC: 17 U/L (ref 15–37)
AST SERPL-CCNC: 171 U/L (ref 15–37)
AST SERPL-CCNC: 175 U/L (ref 15–37)
AST SERPL-CCNC: 177 U/L (ref 15–37)
AST SERPL-CCNC: 178 U/L (ref 15–37)
AST SERPL-CCNC: 178 U/L (ref 15–37)
AST SERPL-CCNC: 18 U/L (ref 15–37)
AST SERPL-CCNC: 185 U/L (ref 15–37)
AST SERPL-CCNC: 187 U/L (ref 15–37)
AST SERPL-CCNC: 19 U/L (ref 15–37)
AST SERPL-CCNC: 20 U/L (ref 15–37)
AST SERPL-CCNC: 205 U/L (ref 15–37)
AST SERPL-CCNC: 22 U/L (ref 15–37)
AST SERPL-CCNC: 22 U/L (ref 15–37)
AST SERPL-CCNC: 234 U/L (ref 15–37)
AST SERPL-CCNC: 235 U/L (ref 15–37)
AST SERPL-CCNC: 236 U/L (ref 15–37)
AST SERPL-CCNC: 237 U/L (ref 15–37)
AST SERPL-CCNC: 24 U/L (ref 15–37)
AST SERPL-CCNC: 246 U/L (ref 15–37)
AST SERPL-CCNC: 249 U/L (ref 15–37)
AST SERPL-CCNC: 258 U/L (ref 15–37)
AST SERPL-CCNC: 262 U/L (ref 15–37)
AST SERPL-CCNC: 264 U/L (ref 15–37)
AST SERPL-CCNC: 269 U/L (ref 15–37)
AST SERPL-CCNC: 28 U/L (ref 15–37)
AST SERPL-CCNC: 280 U/L (ref 15–37)
AST SERPL-CCNC: 302 U/L (ref 15–37)
AST SERPL-CCNC: 303 U/L (ref 15–37)
AST SERPL-CCNC: 313 U/L (ref 15–37)
AST SERPL-CCNC: 316 U/L (ref 15–37)
AST SERPL-CCNC: 32 U/L (ref 15–37)
AST SERPL-CCNC: 374 U/L (ref 15–37)
AST SERPL-CCNC: 385 U/L (ref 15–37)
AST SERPL-CCNC: 459 U/L (ref 15–37)
AST SERPL-CCNC: 468 U/L (ref 15–37)
AST SERPL-CCNC: 59 U/L (ref 15–37)
AST SERPL-CCNC: 600 U/L (ref 15–37)
AST SERPL-CCNC: 61 U/L (ref 15–37)
AST SERPL-CCNC: 634 U/L (ref 15–37)
AST SERPL-CCNC: 76 U/L (ref 15–37)
AST SERPL-CCNC: 80 U/L (ref 15–37)
AST SERPL-CCNC: 82 U/L (ref 15–37)
AST SERPL-CCNC: 836 U/L (ref 15–37)
AST SERPL-CCNC: 86 U/L (ref 15–37)
AST SERPL-CCNC: 96 U/L (ref 15–37)
ATRIAL RATE: 121 BPM
ATRIAL RATE: 122 BPM
ATRIAL RATE: 122 BPM
ATRIAL RATE: 138 BPM
ATRIAL RATE: 416 BPM
ATRIAL RATE: 90 BPM
ATRIAL RATE: 90 BPM
ATRIAL RATE: 95 BPM
ATRIAL RATE: 96 BPM
B PERT DNA SPEC QL NAA+PROBE: NOT DETECTED
BACTERIA SPEC CULT: ABNORMAL
BACTERIA SPEC CULT: NORMAL
BACTERIA URNS QL MICRO: ABNORMAL /HPF
BACTERIA URNS QL MICRO: ABNORMAL /HPF
BACTERIA URNS QL MICRO: NEGATIVE /HPF
BARBITURATES UR QL SCN: NEGATIVE
BASE DEFICIT BLD-SCNC: 0.4 MMOL/L
BASE DEFICIT BLD-SCNC: 1 MMOL/L
BASE DEFICIT BLD-SCNC: 1.1 MMOL/L
BASE DEFICIT BLD-SCNC: 1.3 MMOL/L
BASE DEFICIT BLD-SCNC: 1.8 MMOL/L
BASE DEFICIT BLD-SCNC: 12.3 MMOL/L
BASE DEFICIT BLD-SCNC: 3.2 MMOL/L
BASE DEFICIT BLD-SCNC: 3.3 MMOL/L
BASE DEFICIT BLD-SCNC: 3.5 MMOL/L
BASE DEFICIT BLD-SCNC: 4.6 MMOL/L
BASE DEFICIT BLD-SCNC: 5 MMOL/L
BASE DEFICIT BLD-SCNC: 5.1 MMOL/L
BASE DEFICIT BLD-SCNC: 5.5 MMOL/L
BASE DEFICIT BLD-SCNC: 6 MMOL/L
BASE DEFICIT BLD-SCNC: 6.3 MMOL/L
BASE DEFICIT BLD-SCNC: 6.8 MMOL/L
BASE DEFICIT BLD-SCNC: 7.7 MMOL/L
BASE DEFICIT BLD-SCNC: 7.8 MMOL/L
BASE DEFICIT BLD-SCNC: 9.2 MMOL/L
BASE DEFICIT BLDV-SCNC: 4.6 MMOL/L
BASE DEFICIT BLDV-SCNC: 5.3 MMOL/L
BASE DEFICIT BLDV-SCNC: 6.6 MMOL/L
BASE DEFICIT BLDV-SCNC: 8.1 MMOL/L
BASE EXCESS BLD CALC-SCNC: 0.1 MMOL/L
BASE EXCESS BLD CALC-SCNC: 0.3 MMOL/L
BASE EXCESS BLD CALC-SCNC: 1 MMOL/L
BASE EXCESS BLD CALC-SCNC: 2.8 MMOL/L
BASOPHILS # BLD: 0 K/UL (ref 0–0.1)
BASOPHILS # BLD: 0.1 K/UL (ref 0–0.1)
BASOPHILS NFR BLD: 0 % (ref 0–1)
BASOPHILS NFR BLD: 1 % (ref 0–1)
BDY SITE: ABNORMAL
BENZODIAZ UR QL: NEGATIVE
BILIRUB DIRECT SERPL-MCNC: 0.3 MG/DL (ref 0–0.2)
BILIRUB DIRECT SERPL-MCNC: 13.2 MG/DL (ref 0–0.2)
BILIRUB DIRECT SERPL-MCNC: 6.4 MG/DL (ref 0–0.2)
BILIRUB SERPL-MCNC: 0.4 MG/DL (ref 0.2–1)
BILIRUB SERPL-MCNC: 0.5 MG/DL (ref 0.2–1)
BILIRUB SERPL-MCNC: 0.6 MG/DL (ref 0.2–1)
BILIRUB SERPL-MCNC: 0.7 MG/DL (ref 0.2–1)
BILIRUB SERPL-MCNC: 0.8 MG/DL (ref 0.2–1)
BILIRUB SERPL-MCNC: 1 MG/DL (ref 0.2–1)
BILIRUB SERPL-MCNC: 10.3 MG/DL (ref 0.2–1)
BILIRUB SERPL-MCNC: 10.7 MG/DL (ref 0.2–1)
BILIRUB SERPL-MCNC: 10.8 MG/DL (ref 0.2–1)
BILIRUB SERPL-MCNC: 11.3 MG/DL (ref 0.2–1)
BILIRUB SERPL-MCNC: 11.4 MG/DL (ref 0.2–1)
BILIRUB SERPL-MCNC: 11.5 MG/DL (ref 0.2–1)
BILIRUB SERPL-MCNC: 11.7 MG/DL (ref 0.2–1)
BILIRUB SERPL-MCNC: 11.7 MG/DL (ref 0.2–1)
BILIRUB SERPL-MCNC: 12 MG/DL (ref 0.2–1)
BILIRUB SERPL-MCNC: 12.2 MG/DL (ref 0.2–1)
BILIRUB SERPL-MCNC: 12.7 MG/DL (ref 0.2–1)
BILIRUB SERPL-MCNC: 13.2 MG/DL (ref 0.2–1)
BILIRUB SERPL-MCNC: 13.6 MG/DL (ref 0.2–1)
BILIRUB SERPL-MCNC: 13.7 MG/DL (ref 0.2–1)
BILIRUB SERPL-MCNC: 14.3 MG/DL (ref 0.2–1)
BILIRUB SERPL-MCNC: 14.4 MG/DL (ref 0.2–1)
BILIRUB SERPL-MCNC: 14.9 MG/DL (ref 0.2–1)
BILIRUB SERPL-MCNC: 15 MG/DL (ref 0.2–1)
BILIRUB SERPL-MCNC: 15.3 MG/DL (ref 0.2–1)
BILIRUB SERPL-MCNC: 15.9 MG/DL (ref 0.2–1)
BILIRUB SERPL-MCNC: 16.9 MG/DL (ref 0.2–1)
BILIRUB SERPL-MCNC: 16.9 MG/DL (ref 0.2–1)
BILIRUB SERPL-MCNC: 17.7 MG/DL (ref 0.2–1)
BILIRUB SERPL-MCNC: 19.4 MG/DL (ref 0.2–1)
BILIRUB SERPL-MCNC: 20.6 MG/DL (ref 0.2–1)
BILIRUB SERPL-MCNC: 20.7 MG/DL (ref 0.2–1)
BILIRUB SERPL-MCNC: 22.5 MG/DL (ref 0.2–1)
BILIRUB SERPL-MCNC: 22.6 MG/DL (ref 0.2–1)
BILIRUB SERPL-MCNC: 23 MG/DL (ref 0.2–1)
BILIRUB SERPL-MCNC: 23.2 MG/DL (ref 0.2–1)
BILIRUB SERPL-MCNC: 23.5 MG/DL (ref 0.2–1)
BILIRUB SERPL-MCNC: 24 MG/DL (ref 0.2–1)
BILIRUB SERPL-MCNC: 24.3 MG/DL (ref 0.2–1)
BILIRUB SERPL-MCNC: 24.3 MG/DL (ref 0.2–1)
BILIRUB SERPL-MCNC: 24.4 MG/DL (ref 0.2–1)
BILIRUB SERPL-MCNC: 3.4 MG/DL (ref 0.2–1)
BILIRUB SERPL-MCNC: 3.6 MG/DL (ref 0.2–1)
BILIRUB SERPL-MCNC: 4.8 MG/DL (ref 0.2–1)
BILIRUB SERPL-MCNC: 5.9 MG/DL (ref 0.2–1)
BILIRUB SERPL-MCNC: 7.2 MG/DL (ref 0.2–1)
BILIRUB SERPL-MCNC: 8.5 MG/DL (ref 0.2–1)
BILIRUB SERPL-MCNC: 8.6 MG/DL (ref 0.2–1)
BILIRUB UR QL CFM: NEGATIVE
BILIRUB UR QL CFM: POSITIVE
BILIRUB UR QL: NEGATIVE
BLD GP AB SCN SERPL QL ELUTION: NORMAL
BLD GP AB SCN SERPL QL ELUTION: NORMAL
BLD PROD TYP BPU: NORMAL
BLOOD BANK CMNT PATIENT-IMP: NORMAL
BLOOD GROUP ANTIBODIES SERPL: NORMAL
BNP SERPL-MCNC: 2462 PG/ML
BNP SERPL-MCNC: 2702 PG/ML
BNP SERPL-MCNC: 2998 PG/ML
BNP SERPL-MCNC: 3038 PG/ML
BNP SERPL-MCNC: 3078 PG/ML
BNP SERPL-MCNC: 3105 PG/ML
BNP SERPL-MCNC: 3868 PG/ML
BNP SERPL-MCNC: 4159 PG/ML
BNP SERPL-MCNC: 4191 PG/ML
BNP SERPL-MCNC: 4489 PG/ML
BNP SERPL-MCNC: 4524 PG/ML
BNP SERPL-MCNC: 4879 PG/ML
BNP SERPL-MCNC: 4884 PG/ML
BNP SERPL-MCNC: 4950 PG/ML
BNP SERPL-MCNC: 5174 PG/ML
BNP SERPL-MCNC: 5357 PG/ML
BNP SERPL-MCNC: 5694 PG/ML
BNP SERPL-MCNC: 5710 PG/ML
BNP SERPL-MCNC: 6106 PG/ML
BNP SERPL-MCNC: 6431 PG/ML
BNP SERPL-MCNC: 6612 PG/ML
BNP SERPL-MCNC: 6639 PG/ML
BNP SERPL-MCNC: 6809 PG/ML
BNP SERPL-MCNC: 7094 PG/ML
BNP SERPL-MCNC: 7725 PG/ML
BNP SERPL-MCNC: 7872 PG/ML
BNP SERPL-MCNC: 7956 PG/ML
BNP SERPL-MCNC: 8354 PG/ML
BNP SERPL-MCNC: 8757 PG/ML
BNP SERPL-MCNC: 9342 PG/ML
BNP SERPL-MCNC: 9387 PG/ML
BNP SERPL-MCNC: 9442 PG/ML
BNP SERPL-MCNC: 9726 PG/ML
BNP SERPL-MCNC: 9737 PG/ML
BNP SERPL-MCNC: ABNORMAL PG/ML
BORDETELLA PARAPERTUSSIS PCR, BORPAR: NOT DETECTED
BPU ID: NORMAL
BUN SERPL-MCNC: 106 MG/DL (ref 6–20)
BUN SERPL-MCNC: 16 MG/DL (ref 6–20)
BUN SERPL-MCNC: 17 MG/DL (ref 6–20)
BUN SERPL-MCNC: 19 MG/DL (ref 6–20)
BUN SERPL-MCNC: 19 MG/DL (ref 6–20)
BUN SERPL-MCNC: 21 MG/DL (ref 6–20)
BUN SERPL-MCNC: 22 MG/DL (ref 6–20)
BUN SERPL-MCNC: 23 MG/DL (ref 6–20)
BUN SERPL-MCNC: 24 MG/DL (ref 6–20)
BUN SERPL-MCNC: 25 MG/DL (ref 6–20)
BUN SERPL-MCNC: 26 MG/DL (ref 6–20)
BUN SERPL-MCNC: 27 MG/DL (ref 6–20)
BUN SERPL-MCNC: 28 MG/DL (ref 6–20)
BUN SERPL-MCNC: 29 MG/DL (ref 6–20)
BUN SERPL-MCNC: 30 MG/DL (ref 6–20)
BUN SERPL-MCNC: 31 MG/DL (ref 6–20)
BUN SERPL-MCNC: 32 MG/DL (ref 6–20)
BUN SERPL-MCNC: 32 MG/DL (ref 6–20)
BUN SERPL-MCNC: 33 MG/DL (ref 6–20)
BUN SERPL-MCNC: 33 MG/DL (ref 6–20)
BUN SERPL-MCNC: 34 MG/DL (ref 6–20)
BUN SERPL-MCNC: 35 MG/DL (ref 6–20)
BUN SERPL-MCNC: 36 MG/DL (ref 6–20)
BUN SERPL-MCNC: 36 MG/DL (ref 6–20)
BUN SERPL-MCNC: 37 MG/DL (ref 6–20)
BUN SERPL-MCNC: 38 MG/DL (ref 6–20)
BUN SERPL-MCNC: 39 MG/DL (ref 6–20)
BUN SERPL-MCNC: 40 MG/DL (ref 6–20)
BUN SERPL-MCNC: 40 MG/DL (ref 6–20)
BUN SERPL-MCNC: 42 MG/DL (ref 6–20)
BUN SERPL-MCNC: 44 MG/DL (ref 6–20)
BUN SERPL-MCNC: 46 MG/DL (ref 6–20)
BUN SERPL-MCNC: 46 MG/DL (ref 6–20)
BUN SERPL-MCNC: 52 MG/DL (ref 6–20)
BUN SERPL-MCNC: 52 MG/DL (ref 6–20)
BUN SERPL-MCNC: 56 MG/DL (ref 6–20)
BUN SERPL-MCNC: 56 MG/DL (ref 6–20)
BUN SERPL-MCNC: 62 MG/DL (ref 6–20)
BUN SERPL-MCNC: 65 MG/DL (ref 6–20)
BUN SERPL-MCNC: 69 MG/DL (ref 6–20)
BUN SERPL-MCNC: 73 MG/DL (ref 6–20)
BUN SERPL-MCNC: 87 MG/DL (ref 6–20)
BUN/CREAT SERPL: 11 (ref 12–20)
BUN/CREAT SERPL: 12 (ref 12–20)
BUN/CREAT SERPL: 12 (ref 12–20)
BUN/CREAT SERPL: 13 (ref 12–20)
BUN/CREAT SERPL: 14 (ref 12–20)
BUN/CREAT SERPL: 15 (ref 12–20)
BUN/CREAT SERPL: 16 (ref 12–20)
BUN/CREAT SERPL: 17 (ref 12–20)
BUN/CREAT SERPL: 18 (ref 12–20)
BUN/CREAT SERPL: 19 (ref 12–20)
BUN/CREAT SERPL: 20 (ref 12–20)
BUN/CREAT SERPL: 21 (ref 12–20)
BUN/CREAT SERPL: 21 (ref 12–20)
BUN/CREAT SERPL: 27 (ref 12–20)
BUN/CREAT SERPL: 53 (ref 12–20)
C PNEUM DNA SPEC QL NAA+PROBE: NOT DETECTED
CA-I BLD-MCNC: 1.18 MMOL/L (ref 1.12–1.32)
CA-I BLD-SCNC: 1.08 MMOL/L (ref 1.12–1.32)
CA-I BLD-SCNC: 1.08 MMOL/L (ref 1.12–1.32)
CA-I BLD-SCNC: 1.1 MMOL/L (ref 1.12–1.32)
CA-I BLD-SCNC: 1.12 MMOL/L (ref 1.12–1.32)
CA-I BLD-SCNC: 1.15 MMOL/L (ref 1.12–1.32)
CA-I BLD-SCNC: 1.18 MMOL/L (ref 1.12–1.32)
CA-I BLD-SCNC: 1.18 MMOL/L (ref 1.12–1.32)
CA-I BLD-SCNC: 1.19 MMOL/L (ref 1.12–1.32)
CA-I BLD-SCNC: 1.19 MMOL/L (ref 1.12–1.32)
CA-I BLD-SCNC: 1.2 MMOL/L (ref 1.12–1.32)
CA-I BLD-SCNC: 1.43 MMOL/L (ref 1.12–1.32)
CA-I BLD-SCNC: 1.46 MMOL/L (ref 1.12–1.32)
CALCIUM SERPL-MCNC: 10 MG/DL (ref 8.5–10.1)
CALCIUM SERPL-MCNC: 10.3 MG/DL (ref 8.5–10.1)
CALCIUM SERPL-MCNC: 10.3 MG/DL (ref 8.5–10.1)
CALCIUM SERPL-MCNC: 10.5 MG/DL (ref 8.5–10.1)
CALCIUM SERPL-MCNC: 10.6 MG/DL (ref 8.5–10.1)
CALCIUM SERPL-MCNC: 10.8 MG/DL (ref 8.5–10.1)
CALCIUM SERPL-MCNC: 10.8 MG/DL (ref 8.5–10.1)
CALCIUM SERPL-MCNC: 10.9 MG/DL (ref 8.5–10.1)
CALCIUM SERPL-MCNC: 11 MG/DL (ref 8.5–10.1)
CALCIUM SERPL-MCNC: 11.5 MG/DL (ref 8.5–10.1)
CALCIUM SERPL-MCNC: 11.6 MG/DL (ref 8.5–10.1)
CALCIUM SERPL-MCNC: 11.6 MG/DL (ref 8.5–10.1)
CALCIUM SERPL-MCNC: 11.7 MG/DL (ref 8.5–10.1)
CALCIUM SERPL-MCNC: 11.8 MG/DL (ref 8.5–10.1)
CALCIUM SERPL-MCNC: 11.9 MG/DL (ref 8.5–10.1)
CALCIUM SERPL-MCNC: 11.9 MG/DL (ref 8.5–10.1)
CALCIUM SERPL-MCNC: 12.1 MG/DL (ref 8.5–10.1)
CALCIUM SERPL-MCNC: 12.1 MG/DL (ref 8.5–10.1)
CALCIUM SERPL-MCNC: 12.4 MG/DL (ref 8.5–10.1)
CALCIUM SERPL-MCNC: 13.2 MG/DL (ref 8.5–10.1)
CALCIUM SERPL-MCNC: 8.3 MG/DL (ref 8.5–10.1)
CALCIUM SERPL-MCNC: 8.4 MG/DL (ref 8.5–10.1)
CALCIUM SERPL-MCNC: 8.6 MG/DL (ref 8.5–10.1)
CALCIUM SERPL-MCNC: 8.7 MG/DL (ref 8.5–10.1)
CALCIUM SERPL-MCNC: 8.8 MG/DL (ref 8.5–10.1)
CALCIUM SERPL-MCNC: 8.9 MG/DL (ref 8.5–10.1)
CALCIUM SERPL-MCNC: 9 MG/DL (ref 8.5–10.1)
CALCIUM SERPL-MCNC: 9.1 MG/DL (ref 8.5–10.1)
CALCIUM SERPL-MCNC: 9.3 MG/DL (ref 8.5–10.1)
CALCIUM SERPL-MCNC: 9.5 MG/DL (ref 8.5–10.1)
CALCIUM SERPL-MCNC: 9.7 MG/DL (ref 8.5–10.1)
CALCIUM SERPL-MCNC: 9.8 MG/DL (ref 8.5–10.1)
CALCULATED P AXIS, ECG09: -9 DEGREES
CALCULATED P AXIS, ECG09: 52 DEGREES
CALCULATED P AXIS, ECG09: 70 DEGREES
CALCULATED P AXIS, ECG09: 71 DEGREES
CALCULATED P AXIS, ECG09: 83 DEGREES
CALCULATED P AXIS, ECG09: 91 DEGREES
CALCULATED R AXIS, ECG10: -174 DEGREES
CALCULATED R AXIS, ECG10: -35 DEGREES
CALCULATED R AXIS, ECG10: 22 DEGREES
CALCULATED R AXIS, ECG10: 39 DEGREES
CALCULATED R AXIS, ECG10: 63 DEGREES
CALCULATED R AXIS, ECG10: 70 DEGREES
CALCULATED R AXIS, ECG10: 83 DEGREES
CALCULATED R AXIS, ECG10: 85 DEGREES
CALCULATED R AXIS, ECG10: 87 DEGREES
CALCULATED R AXIS, ECG10: 91 DEGREES
CALCULATED T AXIS, ECG11: -130 DEGREES
CALCULATED T AXIS, ECG11: -49 DEGREES
CALCULATED T AXIS, ECG11: -74 DEGREES
CALCULATED T AXIS, ECG11: -98 DEGREES
CALCULATED T AXIS, ECG11: 116 DEGREES
CALCULATED T AXIS, ECG11: 131 DEGREES
CALCULATED T AXIS, ECG11: 133 DEGREES
CALCULATED T AXIS, ECG11: 149 DEGREES
CALCULATED T AXIS, ECG11: 25 DEGREES
CALCULATED T AXIS, ECG11: 43 DEGREES
CANNABINOIDS UR QL SCN: NEGATIVE
CC UR VC: ABNORMAL
CGA SERPL-SCNC: 502.6 NG/ML (ref 0–101.8)
CHLORIDE BLD-SCNC: 113 MMOL/L (ref 100–108)
CHLORIDE SERPL-SCNC: 100 MMOL/L (ref 97–108)
CHLORIDE SERPL-SCNC: 100 MMOL/L (ref 97–108)
CHLORIDE SERPL-SCNC: 101 MMOL/L (ref 97–108)
CHLORIDE SERPL-SCNC: 102 MMOL/L (ref 97–108)
CHLORIDE SERPL-SCNC: 103 MMOL/L (ref 97–108)
CHLORIDE SERPL-SCNC: 104 MMOL/L (ref 97–108)
CHLORIDE SERPL-SCNC: 105 MMOL/L (ref 97–108)
CHLORIDE SERPL-SCNC: 106 MMOL/L (ref 97–108)
CHLORIDE SERPL-SCNC: 107 MMOL/L (ref 97–108)
CHLORIDE SERPL-SCNC: 108 MMOL/L (ref 97–108)
CHLORIDE SERPL-SCNC: 109 MMOL/L (ref 97–108)
CHLORIDE SERPL-SCNC: 110 MMOL/L (ref 97–108)
CHLORIDE SERPL-SCNC: 110 MMOL/L (ref 97–108)
CHLORIDE SERPL-SCNC: 111 MMOL/L (ref 97–108)
CHLORIDE SERPL-SCNC: 112 MMOL/L (ref 97–108)
CHLORIDE SERPL-SCNC: 113 MMOL/L (ref 97–108)
CHLORIDE SERPL-SCNC: 114 MMOL/L (ref 97–108)
CHLORIDE SERPL-SCNC: 116 MMOL/L (ref 97–108)
CHLORIDE SERPL-SCNC: 117 MMOL/L (ref 97–108)
CHLORIDE SERPL-SCNC: 98 MMOL/L (ref 97–108)
CHLORIDE SERPL-SCNC: 99 MMOL/L (ref 97–108)
CHLORIDE SERPL-SCNC: 99 MMOL/L (ref 97–108)
CHOLEST SERPL-MCNC: <50 MG/DL
CK SERPL-CCNC: 76 U/L (ref 39–308)
CMV DNA SERPL NAA+PROBE-ACNC: NORMAL IU/ML
CMV DNA SERPL NAA+PROBE-LOG IU: NORMAL LOG10 IU/ML
CO2 BLD-SCNC: 17 MMOL/L (ref 19–24)
CO2 SERPL-SCNC: 13 MMOL/L (ref 21–32)
CO2 SERPL-SCNC: 16 MMOL/L (ref 21–32)
CO2 SERPL-SCNC: 18 MMOL/L (ref 21–32)
CO2 SERPL-SCNC: 19 MMOL/L (ref 21–32)
CO2 SERPL-SCNC: 20 MMOL/L (ref 21–32)
CO2 SERPL-SCNC: 21 MMOL/L (ref 21–32)
CO2 SERPL-SCNC: 22 MMOL/L (ref 21–32)
CO2 SERPL-SCNC: 23 MMOL/L (ref 21–32)
CO2 SERPL-SCNC: 24 MMOL/L (ref 21–32)
CO2 SERPL-SCNC: 25 MMOL/L (ref 21–32)
CO2 SERPL-SCNC: 26 MMOL/L (ref 21–32)
CO2 SERPL-SCNC: 27 MMOL/L (ref 21–32)
CO2 SERPL-SCNC: 29 MMOL/L (ref 21–32)
CO2 SERPL-SCNC: 29 MMOL/L (ref 21–32)
CO2 SERPL-SCNC: 31 MMOL/L (ref 21–32)
CO2 SERPL-SCNC: 32 MMOL/L (ref 21–32)
COCAINE UR QL SCN: NEGATIVE
COHGB MFR BLD: 0.5 % (ref 1–2)
COHGB MFR BLD: 0.9 % (ref 1–2)
COHGB MFR BLD: 1 % (ref 1–2)
COHGB MFR BLD: 1.1 % (ref 1–2)
COHGB MFR BLD: 1.2 % (ref 1–2)
COHGB MFR BLD: 1.7 % (ref 1–2)
COLOR UR: ABNORMAL
COMMENT, HOLDF: NORMAL
CORTIS SERPL-MCNC: 14.6 UG/DL
CORTIS SERPL-MCNC: 14.9 UG/DL
COTININE UR QL SCN: NEGATIVE NG/ML
COVID-19 RAPID TEST, COVR: NOT DETECTED
CREAT SERPL-MCNC: 1.15 MG/DL (ref 0.7–1.3)
CREAT SERPL-MCNC: 1.36 MG/DL (ref 0.7–1.3)
CREAT SERPL-MCNC: 1.37 MG/DL (ref 0.7–1.3)
CREAT SERPL-MCNC: 1.4 MG/DL (ref 0.7–1.3)
CREAT SERPL-MCNC: 1.45 MG/DL (ref 0.7–1.3)
CREAT SERPL-MCNC: 1.46 MG/DL (ref 0.7–1.3)
CREAT SERPL-MCNC: 1.48 MG/DL (ref 0.7–1.3)
CREAT SERPL-MCNC: 1.49 MG/DL (ref 0.7–1.3)
CREAT SERPL-MCNC: 1.5 MG/DL (ref 0.7–1.3)
CREAT SERPL-MCNC: 1.51 MG/DL (ref 0.7–1.3)
CREAT SERPL-MCNC: 1.53 MG/DL (ref 0.7–1.3)
CREAT SERPL-MCNC: 1.54 MG/DL (ref 0.7–1.3)
CREAT SERPL-MCNC: 1.55 MG/DL (ref 0.7–1.3)
CREAT SERPL-MCNC: 1.55 MG/DL (ref 0.7–1.3)
CREAT SERPL-MCNC: 1.56 MG/DL (ref 0.7–1.3)
CREAT SERPL-MCNC: 1.58 MG/DL (ref 0.7–1.3)
CREAT SERPL-MCNC: 1.59 MG/DL (ref 0.7–1.3)
CREAT SERPL-MCNC: 1.6 MG/DL (ref 0.7–1.3)
CREAT SERPL-MCNC: 1.61 MG/DL (ref 0.7–1.3)
CREAT SERPL-MCNC: 1.62 MG/DL (ref 0.7–1.3)
CREAT SERPL-MCNC: 1.62 MG/DL (ref 0.7–1.3)
CREAT SERPL-MCNC: 1.63 MG/DL (ref 0.7–1.3)
CREAT SERPL-MCNC: 1.63 MG/DL (ref 0.7–1.3)
CREAT SERPL-MCNC: 1.64 MG/DL (ref 0.7–1.3)
CREAT SERPL-MCNC: 1.66 MG/DL (ref 0.7–1.3)
CREAT SERPL-MCNC: 1.67 MG/DL (ref 0.7–1.3)
CREAT SERPL-MCNC: 1.68 MG/DL (ref 0.7–1.3)
CREAT SERPL-MCNC: 1.71 MG/DL (ref 0.7–1.3)
CREAT SERPL-MCNC: 1.74 MG/DL (ref 0.7–1.3)
CREAT SERPL-MCNC: 1.78 MG/DL (ref 0.7–1.3)
CREAT SERPL-MCNC: 1.81 MG/DL (ref 0.7–1.3)
CREAT SERPL-MCNC: 1.81 MG/DL (ref 0.7–1.3)
CREAT SERPL-MCNC: 1.82 MG/DL (ref 0.7–1.3)
CREAT SERPL-MCNC: 1.85 MG/DL (ref 0.7–1.3)
CREAT SERPL-MCNC: 1.88 MG/DL (ref 0.7–1.3)
CREAT SERPL-MCNC: 1.9 MG/DL (ref 0.7–1.3)
CREAT SERPL-MCNC: 1.92 MG/DL (ref 0.7–1.3)
CREAT SERPL-MCNC: 1.93 MG/DL (ref 0.7–1.3)
CREAT SERPL-MCNC: 1.94 MG/DL (ref 0.7–1.3)
CREAT SERPL-MCNC: 1.94 MG/DL (ref 0.7–1.3)
CREAT SERPL-MCNC: 1.95 MG/DL (ref 0.7–1.3)
CREAT SERPL-MCNC: 1.96 MG/DL (ref 0.7–1.3)
CREAT SERPL-MCNC: 2.01 MG/DL (ref 0.7–1.3)
CREAT SERPL-MCNC: 2.01 MG/DL (ref 0.7–1.3)
CREAT SERPL-MCNC: 2.06 MG/DL (ref 0.7–1.3)
CREAT SERPL-MCNC: 2.07 MG/DL (ref 0.7–1.3)
CREAT SERPL-MCNC: 2.08 MG/DL (ref 0.7–1.3)
CREAT SERPL-MCNC: 2.09 MG/DL (ref 0.7–1.3)
CREAT SERPL-MCNC: 2.12 MG/DL (ref 0.7–1.3)
CREAT SERPL-MCNC: 2.14 MG/DL (ref 0.7–1.3)
CREAT SERPL-MCNC: 2.16 MG/DL (ref 0.7–1.3)
CREAT SERPL-MCNC: 2.23 MG/DL (ref 0.7–1.3)
CREAT SERPL-MCNC: 2.32 MG/DL (ref 0.7–1.3)
CREAT SERPL-MCNC: 2.33 MG/DL (ref 0.7–1.3)
CREAT SERPL-MCNC: 2.38 MG/DL (ref 0.7–1.3)
CREAT SERPL-MCNC: 2.39 MG/DL (ref 0.7–1.3)
CREAT SERPL-MCNC: 2.45 MG/DL (ref 0.7–1.3)
CREAT SERPL-MCNC: 2.48 MG/DL (ref 0.7–1.3)
CREAT SERPL-MCNC: 2.49 MG/DL (ref 0.7–1.3)
CREAT SERPL-MCNC: 2.51 MG/DL (ref 0.7–1.3)
CREAT SERPL-MCNC: 2.81 MG/DL (ref 0.7–1.3)
CREAT SERPL-MCNC: 2.88 MG/DL (ref 0.7–1.3)
CREAT SERPL-MCNC: 2.95 MG/DL (ref 0.7–1.3)
CREAT SERPL-MCNC: 3.17 MG/DL (ref 0.7–1.3)
CREAT SERPL-MCNC: 3.26 MG/DL (ref 0.7–1.3)
CREAT SERPL-MCNC: 3.36 MG/DL (ref 0.7–1.3)
CREAT SERPL-MCNC: 3.53 MG/DL (ref 0.7–1.3)
CREAT SERPL-MCNC: 4.28 MG/DL (ref 0.7–1.3)
CREAT SERPL-MCNC: 4.53 MG/DL (ref 0.7–1.3)
CREAT UR-MCNC: 2.3 MG/DL (ref 0.6–1.3)
CREAT UR-MCNC: 30.5 MG/DL
CROSSMATCH RESULT,%XM: NORMAL
CRP SERPL HS-MCNC: >9.5 MG/L
CRP SERPL-MCNC: 1.58 MG/DL (ref 0–0.6)
CRP SERPL-MCNC: 12.7 MG/DL (ref 0–0.6)
CRP SERPL-MCNC: 6.74 MG/DL (ref 0–0.6)
DAT IGG-SP REAG RBC QL: NORMAL
DAT POLY-SP REAG RBC QL: NORMAL
DATE LAST DOSE: ABNORMAL
DIAGNOSIS, 93000: NORMAL
DIFFERENTIAL METHOD BLD: ABNORMAL
DIGOXIN SERPL-MCNC: 0.3 NG/ML (ref 0.9–2)
DIGOXIN SERPL-MCNC: 0.7 NG/ML (ref 0.9–2)
DIGOXIN SERPL-MCNC: 0.8 NG/ML (ref 0.9–2)
DIGOXIN SERPL-MCNC: 1 NG/ML (ref 0.9–2)
DIGOXIN SERPL-MCNC: 1.1 NG/ML (ref 0.9–2)
DIGOXIN SERPL-MCNC: 1.1 NG/ML (ref 0.9–2)
DIGOXIN SERPL-MCNC: 1.2 NG/ML (ref 0.9–2)
DIGOXIN SERPL-MCNC: 1.3 NG/ML (ref 0.9–2)
DRUG SCREEN COMMENT:, 753798: NORMAL
DRUG SCRN COMMENT,DRGCM: NORMAL
ECHO AO ROOT DIAM: 2.9 CM
ECHO AO ROOT INDEX: 1.75 CM/M2
ECHO EST RA PRESSURE: 3 MMHG
ECHO EST RA PRESSURE: 3 MMHG
ECHO LA DIAMETER INDEX: 2.15 CM/M2
ECHO LA DIAMETER INDEX: 2.83 CM/M2
ECHO LA DIAMETER: 3.8 CM
ECHO LA DIAMETER: 4.7 CM
ECHO LA TO AORTIC ROOT RATIO: 1.62
ECHO LV ASSIST DEVICE BASELINE SPEED: 4600 RPM
ECHO LV ASSIST DEVICE BASELINE SPEED: 4600 RPM
ECHO LV ASSIST DEVICE BASELINE SPEED: 4800 RPM
ECHO LV EF PHYSICIAN: 20 %
ECHO LV FRACTIONAL SHORTENING: 10 % (ref 28–44)
ECHO LV FRACTIONAL SHORTENING: 13 % (ref 28–44)
ECHO LV FRACTIONAL SHORTENING: 13 % (ref 28–44)
ECHO LV FRACTIONAL SHORTENING: 17 % (ref 28–44)
ECHO LV FRACTIONAL SHORTENING: 30 % (ref 28–44)
ECHO LV FRACTIONAL SHORTENING: 33 % (ref 28–44)
ECHO LV INTERNAL DIMENSION DIASTOLE INDEX: 2.26 CM/M2
ECHO LV INTERNAL DIMENSION DIASTOLE INDEX: 2.27 CM/M2
ECHO LV INTERNAL DIMENSION DIASTOLE INDEX: 2.49 CM/M2
ECHO LV INTERNAL DIMENSION DIASTOLE INDEX: 2.61 CM/M2
ECHO LV INTERNAL DIMENSION DIASTOLE INDEX: 2.96 CM/M2
ECHO LV INTERNAL DIMENSION DIASTOLE INDEX: 3.25 CM/M2
ECHO LV INTERNAL DIMENSION DIASTOLIC: 3.9 CM (ref 4.2–5.9)
ECHO LV INTERNAL DIMENSION DIASTOLIC: 4 CM (ref 4.2–5.9)
ECHO LV INTERNAL DIMENSION DIASTOLIC: 4.4 CM (ref 4.2–5.9)
ECHO LV INTERNAL DIMENSION DIASTOLIC: 4.6 CM (ref 4.2–5.9)
ECHO LV INTERNAL DIMENSION DIASTOLIC: 5.3 CM (ref 4.2–5.9)
ECHO LV INTERNAL DIMENSION DIASTOLIC: 5.4 CM (ref 4.2–5.9)
ECHO LV INTERNAL DIMENSION SYSTOLIC INDEX: 1.75 CM/M2
ECHO LV INTERNAL DIMENSION SYSTOLIC INDEX: 1.76 CM/M2
ECHO LV INTERNAL DIMENSION SYSTOLIC INDEX: 1.98 CM/M2
ECHO LV INTERNAL DIMENSION SYSTOLIC INDEX: 2.03 CM/M2
ECHO LV INTERNAL DIMENSION SYSTOLIC INDEX: 2.46 CM/M2
ECHO LV INTERNAL DIMENSION SYSTOLIC INDEX: 2.83 CM/M2
ECHO LV INTERNAL DIMENSION SYSTOLIC: 3.1 CM
ECHO LV INTERNAL DIMENSION SYSTOLIC: 3.1 CM
ECHO LV INTERNAL DIMENSION SYSTOLIC: 3.4 CM
ECHO LV INTERNAL DIMENSION SYSTOLIC: 3.6 CM
ECHO LV INTERNAL DIMENSION SYSTOLIC: 4.4 CM
ECHO LV INTERNAL DIMENSION SYSTOLIC: 4.7 CM
ECHO LV IVSD: 0.6 CM (ref 0.6–1)
ECHO LV IVSD: 0.7 CM (ref 0.6–1)
ECHO LV IVSD: 0.8 CM (ref 0.6–1)
ECHO LV IVSD: 1 CM (ref 0.6–1)
ECHO LV IVSD: 1 CM (ref 0.6–1)
ECHO LV IVSD: 1.2 CM (ref 0.6–1)
ECHO LV MASS 2D: 119.8 G (ref 88–224)
ECHO LV MASS 2D: 136.2 G (ref 88–224)
ECHO LV MASS 2D: 137.7 G (ref 88–224)
ECHO LV MASS 2D: 138.3 G (ref 88–224)
ECHO LV MASS 2D: 159.3 G (ref 88–224)
ECHO LV MASS 2D: 168.9 G (ref 88–224)
ECHO LV MASS INDEX 2D: 72.2 G/M2 (ref 49–115)
ECHO LV MASS INDEX 2D: 76.9 G/M2 (ref 49–115)
ECHO LV MASS INDEX 2D: 77.3 G/M2 (ref 49–115)
ECHO LV MASS INDEX 2D: 78.3 G/M2 (ref 49–115)
ECHO LV MASS INDEX 2D: 92.6 G/M2 (ref 49–115)
ECHO LV MASS INDEX 2D: 95.4 G/M2 (ref 49–115)
ECHO LV POSTERIOR WALL DIASTOLIC: 0.7 CM (ref 0.6–1)
ECHO LV POSTERIOR WALL DIASTOLIC: 0.7 CM (ref 0.6–1)
ECHO LV POSTERIOR WALL DIASTOLIC: 1.1 CM (ref 0.6–1)
ECHO LV POSTERIOR WALL DIASTOLIC: 1.1 CM (ref 0.6–1)
ECHO LV POSTERIOR WALL DIASTOLIC: 1.2 CM (ref 0.6–1)
ECHO LV POSTERIOR WALL DIASTOLIC: 1.2 CM (ref 0.6–1)
ECHO LV RELATIVE WALL THICKNESS RATIO: 0.26
ECHO LV RELATIVE WALL THICKNESS RATIO: 0.26
ECHO LV RELATIVE WALL THICKNESS RATIO: 0.48
ECHO LV RELATIVE WALL THICKNESS RATIO: 0.55
ECHO LV RELATIVE WALL THICKNESS RATIO: 0.55
ECHO LV RELATIVE WALL THICKNESS RATIO: 0.62
ECHO LVOT AREA: 2.3 CM2
ECHO LVOT DIAM: 1.7 CM
ECHO PULMONARY ARTERY SYSTOLIC PRESSURE (PASP): 25 MMHG
ECHO PULMONARY ARTERY SYSTOLIC PRESSURE (PASP): 30 MMHG
ECHO PULMONARY ARTERY SYSTOLIC PRESSURE (PASP): 40 MMHG
ECHO RIGHT VENTRICULAR SYSTOLIC PRESSURE (RVSP): 28 MMHG
ECHO RIGHT VENTRICULAR SYSTOLIC PRESSURE (RVSP): 40 MMHG
ECHO RV FREE WALL PEAK S': 6 CM/S
ECHO RV INTERNAL DIMENSION: 3.5 CM
ECHO RV TAPSE: 0.7 CM (ref 1.7–?)
ECHO RV TAPSE: 0.8 CM (ref 1.7–?)
ECHO RV TAPSE: 0.9 CM (ref 1.7–?)
ECHO RV TAPSE: 1.1 CM (ref 1.7–?)
ECHO RV TAPSE: 1.1 CM (ref 1.7–?)
ECHO TV REGURGITANT MAX VELOCITY: 2.26 M/S
ECHO TV REGURGITANT MAX VELOCITY: 2.31 M/S
ECHO TV REGURGITANT MAX VELOCITY: 2.48 M/S
ECHO TV REGURGITANT MAX VELOCITY: 2.87 M/S
ECHO TV REGURGITANT MAX VELOCITY: 3.06 M/S
ECHO TV REGURGITANT PEAK GRADIENT: 21 MMHG
ECHO TV REGURGITANT PEAK GRADIENT: 21 MMHG
ECHO TV REGURGITANT PEAK GRADIENT: 25 MMHG
ECHO TV REGURGITANT PEAK GRADIENT: 33 MMHG
ECHO TV REGURGITANT PEAK GRADIENT: 38 MMHG
EOSINOPHIL # BLD: 0 K/UL (ref 0–0.4)
EOSINOPHIL # BLD: 0.1 K/UL (ref 0–0.4)
EOSINOPHIL # BLD: 0.2 K/UL (ref 0–0.4)
EOSINOPHIL # BLD: 0.3 K/UL (ref 0–0.4)
EOSINOPHIL # BLD: 0.4 K/UL (ref 0–0.4)
EOSINOPHIL # BLD: 0.5 K/UL (ref 0–0.4)
EOSINOPHIL # BLD: 0.6 K/UL (ref 0–0.4)
EOSINOPHIL # BLD: 0.8 K/UL (ref 0–0.4)
EOSINOPHIL NFR BLD: 0 % (ref 0–7)
EOSINOPHIL NFR BLD: 1 % (ref 0–7)
EOSINOPHIL NFR BLD: 2 % (ref 0–7)
EOSINOPHIL NFR BLD: 3 % (ref 0–7)
EOSINOPHIL NFR BLD: 4 % (ref 0–7)
EOSINOPHIL NFR BLD: 5 % (ref 0–7)
EOSINOPHIL NFR BLD: 6 % (ref 0–7)
EOSINOPHIL NFR BLD: 7 % (ref 0–7)
EOSINOPHIL NFR BLD: 8 % (ref 0–7)
EOSINOPHIL NFR BLD: 9 % (ref 0–7)
EPITH CASTS URNS QL MICRO: ABNORMAL /LPF
ERYTHROCYTE [DISTWIDTH] IN BLOOD BY AUTOMATED COUNT: 19.9 % (ref 11.5–14.5)
ERYTHROCYTE [DISTWIDTH] IN BLOOD BY AUTOMATED COUNT: 20 % (ref 11.5–14.5)
ERYTHROCYTE [DISTWIDTH] IN BLOOD BY AUTOMATED COUNT: 20.6 % (ref 11.5–14.5)
ERYTHROCYTE [DISTWIDTH] IN BLOOD BY AUTOMATED COUNT: 20.8 % (ref 11.5–14.5)
ERYTHROCYTE [DISTWIDTH] IN BLOOD BY AUTOMATED COUNT: 20.8 % (ref 11.5–14.5)
ERYTHROCYTE [DISTWIDTH] IN BLOOD BY AUTOMATED COUNT: 20.9 % (ref 11.5–14.5)
ERYTHROCYTE [DISTWIDTH] IN BLOOD BY AUTOMATED COUNT: 21 % (ref 11.5–14.5)
ERYTHROCYTE [DISTWIDTH] IN BLOOD BY AUTOMATED COUNT: 21 % (ref 11.5–14.5)
ERYTHROCYTE [DISTWIDTH] IN BLOOD BY AUTOMATED COUNT: 21.1 % (ref 11.5–14.5)
ERYTHROCYTE [DISTWIDTH] IN BLOOD BY AUTOMATED COUNT: 21.2 % (ref 11.5–14.5)
ERYTHROCYTE [DISTWIDTH] IN BLOOD BY AUTOMATED COUNT: 21.6 % (ref 11.5–14.5)
ERYTHROCYTE [DISTWIDTH] IN BLOOD BY AUTOMATED COUNT: 21.7 % (ref 11.5–14.5)
ERYTHROCYTE [DISTWIDTH] IN BLOOD BY AUTOMATED COUNT: 22.2 % (ref 11.5–14.5)
ERYTHROCYTE [DISTWIDTH] IN BLOOD BY AUTOMATED COUNT: 22.2 % (ref 11.5–14.5)
ERYTHROCYTE [DISTWIDTH] IN BLOOD BY AUTOMATED COUNT: 22.3 % (ref 11.5–14.5)
ERYTHROCYTE [DISTWIDTH] IN BLOOD BY AUTOMATED COUNT: 22.3 % (ref 11.5–14.5)
ERYTHROCYTE [DISTWIDTH] IN BLOOD BY AUTOMATED COUNT: 22.6 % (ref 11.5–14.5)
ERYTHROCYTE [DISTWIDTH] IN BLOOD BY AUTOMATED COUNT: 22.8 % (ref 11.5–14.5)
ERYTHROCYTE [DISTWIDTH] IN BLOOD BY AUTOMATED COUNT: 22.9 % (ref 11.5–14.5)
ERYTHROCYTE [DISTWIDTH] IN BLOOD BY AUTOMATED COUNT: 23 % (ref 11.5–14.5)
ERYTHROCYTE [DISTWIDTH] IN BLOOD BY AUTOMATED COUNT: 23.2 % (ref 11.5–14.5)
ERYTHROCYTE [DISTWIDTH] IN BLOOD BY AUTOMATED COUNT: 23.2 % (ref 11.5–14.5)
ERYTHROCYTE [DISTWIDTH] IN BLOOD BY AUTOMATED COUNT: 23.3 % (ref 11.5–14.5)
ERYTHROCYTE [DISTWIDTH] IN BLOOD BY AUTOMATED COUNT: 23.4 % (ref 11.5–14.5)
ERYTHROCYTE [DISTWIDTH] IN BLOOD BY AUTOMATED COUNT: 23.6 % (ref 11.5–14.5)
ERYTHROCYTE [DISTWIDTH] IN BLOOD BY AUTOMATED COUNT: 23.8 % (ref 11.5–14.5)
ERYTHROCYTE [DISTWIDTH] IN BLOOD BY AUTOMATED COUNT: 24.4 % (ref 11.5–14.5)
ERYTHROCYTE [DISTWIDTH] IN BLOOD BY AUTOMATED COUNT: 25 % (ref 11.5–14.5)
ERYTHROCYTE [DISTWIDTH] IN BLOOD BY AUTOMATED COUNT: 25.2 % (ref 11.5–14.5)
ERYTHROCYTE [DISTWIDTH] IN BLOOD BY AUTOMATED COUNT: 25.2 % (ref 11.5–14.5)
ERYTHROCYTE [DISTWIDTH] IN BLOOD BY AUTOMATED COUNT: 25.3 % (ref 11.5–14.5)
ERYTHROCYTE [DISTWIDTH] IN BLOOD BY AUTOMATED COUNT: 25.4 % (ref 11.5–14.5)
ERYTHROCYTE [DISTWIDTH] IN BLOOD BY AUTOMATED COUNT: 25.5 % (ref 11.5–14.5)
ERYTHROCYTE [DISTWIDTH] IN BLOOD BY AUTOMATED COUNT: 25.6 % (ref 11.5–14.5)
ERYTHROCYTE [DISTWIDTH] IN BLOOD BY AUTOMATED COUNT: 25.6 % (ref 11.5–14.5)
ERYTHROCYTE [DISTWIDTH] IN BLOOD BY AUTOMATED COUNT: 25.9 % (ref 11.5–14.5)
ERYTHROCYTE [DISTWIDTH] IN BLOOD BY AUTOMATED COUNT: 27.5 % (ref 11.5–14.5)
ERYTHROCYTE [DISTWIDTH] IN BLOOD BY AUTOMATED COUNT: 27.5 % (ref 11.5–14.5)
ERYTHROCYTE [DISTWIDTH] IN BLOOD BY AUTOMATED COUNT: 27.6 % (ref 11.5–14.5)
ERYTHROCYTE [DISTWIDTH] IN BLOOD BY AUTOMATED COUNT: 27.6 % (ref 11.5–14.5)
ERYTHROCYTE [DISTWIDTH] IN BLOOD BY AUTOMATED COUNT: 27.7 % (ref 11.5–14.5)
ERYTHROCYTE [DISTWIDTH] IN BLOOD BY AUTOMATED COUNT: 27.9 % (ref 11.5–14.5)
ERYTHROCYTE [DISTWIDTH] IN BLOOD BY AUTOMATED COUNT: 27.9 % (ref 11.5–14.5)
ERYTHROCYTE [DISTWIDTH] IN BLOOD BY AUTOMATED COUNT: 28.1 % (ref 11.5–14.5)
ERYTHROCYTE [DISTWIDTH] IN BLOOD BY AUTOMATED COUNT: 28.2 % (ref 11.5–14.5)
ERYTHROCYTE [DISTWIDTH] IN BLOOD BY AUTOMATED COUNT: 28.2 % (ref 11.5–14.5)
ERYTHROCYTE [DISTWIDTH] IN BLOOD BY AUTOMATED COUNT: 28.5 % (ref 11.5–14.5)
ERYTHROCYTE [DISTWIDTH] IN BLOOD BY AUTOMATED COUNT: 29 % (ref 11.5–14.5)
ERYTHROCYTE [DISTWIDTH] IN BLOOD BY AUTOMATED COUNT: 29.1 % (ref 11.5–14.5)
ERYTHROCYTE [DISTWIDTH] IN BLOOD BY AUTOMATED COUNT: 29.3 % (ref 11.5–14.5)
ERYTHROCYTE [DISTWIDTH] IN BLOOD BY AUTOMATED COUNT: 29.6 % (ref 11.5–14.5)
ERYTHROCYTE [DISTWIDTH] IN BLOOD BY AUTOMATED COUNT: 29.7 % (ref 11.5–14.5)
ERYTHROCYTE [SEDIMENTATION RATE] IN BLOOD: 31 MM/HR (ref 0–20)
ERYTHROCYTE [SEDIMENTATION RATE] IN BLOOD: 35 MM/HR (ref 0–20)
ERYTHROCYTE [SEDIMENTATION RATE] IN BLOOD: 37 MM/HR (ref 0–20)
ERYTHROCYTE [SEDIMENTATION RATE] IN BLOOD: 51 MM/HR (ref 0–20)
ERYTHROCYTE [SEDIMENTATION RATE] IN BLOOD: 56 MM/HR (ref 0–20)
ERYTHROCYTE [SEDIMENTATION RATE] IN BLOOD: 67 MM/HR (ref 0–20)
FERRITIN SERPL-MCNC: 274 NG/ML (ref 26–388)
FERRITIN SERPL-MCNC: 346 NG/ML (ref 26–388)
FERRITIN SERPL-MCNC: 595 NG/ML (ref 26–388)
FERRITIN SERPL-MCNC: 60 NG/ML (ref 26–388)
FIO2 ON VENT: 100 %
FIO2 ON VENT: 40 %
FIO2 ON VENT: 60 %
FLUAV AG NPH QL IA: NEGATIVE
FLUAV SUBTYP SPEC NAA+PROBE: NOT DETECTED
FLUBV AG NOSE QL IA: NEGATIVE
FLUBV RNA SPEC QL NAA+PROBE: NOT DETECTED
FOLATE SERPL-MCNC: 11.2 NG/ML (ref 5–21)
GAS FLOW.O2 O2 DELIVERY SYS: ABNORMAL
GAS FLOW.O2 SETTING OXYMISER: 14 BPM
GAS FLOW.O2 SETTING OXYMISER: 16
GAS FLOW.O2 SETTING OXYMISER: 16
GAS FLOW.O2 SETTING OXYMISER: 16 BPM
GAS FLOW.O2 SETTING OXYMISER: 18 BPM
GAS FLOW.O2 SETTING OXYMISER: 20
GAS FLOW.O2 SETTING OXYMISER: 25 BPM
GASTRIN SERPL-MCNC: 783 PG/ML (ref 0–115)
GGT SERPL-CCNC: 392 U/L (ref 15–85)
GLOBULIN SER CALC-MCNC: 1.5 G/DL (ref 2–4)
GLOBULIN SER CALC-MCNC: 1.6 G/DL (ref 2–4)
GLOBULIN SER CALC-MCNC: 1.8 G/DL (ref 2–4)
GLOBULIN SER CALC-MCNC: 1.9 G/DL (ref 2–4)
GLOBULIN SER CALC-MCNC: 2.1 G/DL (ref 2–4)
GLOBULIN SER CALC-MCNC: 2.2 G/DL (ref 2–4)
GLOBULIN SER CALC-MCNC: 2.3 G/DL (ref 2–4)
GLOBULIN SER CALC-MCNC: 2.6 G/DL (ref 2–4)
GLOBULIN SER CALC-MCNC: 2.7 G/DL (ref 2–4)
GLOBULIN SER CALC-MCNC: 2.8 G/DL (ref 2–4)
GLOBULIN SER CALC-MCNC: 2.9 G/DL (ref 2–4)
GLOBULIN SER CALC-MCNC: 3 G/DL (ref 2–4)
GLOBULIN SER CALC-MCNC: 3.1 G/DL (ref 2–4)
GLOBULIN SER CALC-MCNC: 3.2 G/DL (ref 2–4)
GLOBULIN SER CALC-MCNC: 3.5 G/DL (ref 2–4)
GLOBULIN SER CALC-MCNC: 3.5 G/DL (ref 2–4)
GLOBULIN SER CALC-MCNC: 3.6 G/DL (ref 2–4)
GLOBULIN SER CALC-MCNC: 3.7 G/DL (ref 2–4)
GLOBULIN SER CALC-MCNC: 3.8 G/DL (ref 2–4)
GLOBULIN SER CALC-MCNC: 3.8 G/DL (ref 2–4)
GLOBULIN SER CALC-MCNC: 4.1 G/DL (ref 2–4)
GLUCOSE BLD STRIP.AUTO-MCNC: 100 MG/DL (ref 65–117)
GLUCOSE BLD STRIP.AUTO-MCNC: 101 MG/DL (ref 65–117)
GLUCOSE BLD STRIP.AUTO-MCNC: 102 MG/DL (ref 65–117)
GLUCOSE BLD STRIP.AUTO-MCNC: 103 MG/DL (ref 65–117)
GLUCOSE BLD STRIP.AUTO-MCNC: 104 MG/DL (ref 65–117)
GLUCOSE BLD STRIP.AUTO-MCNC: 104 MG/DL (ref 65–117)
GLUCOSE BLD STRIP.AUTO-MCNC: 105 MG/DL (ref 65–117)
GLUCOSE BLD STRIP.AUTO-MCNC: 107 MG/DL (ref 65–117)
GLUCOSE BLD STRIP.AUTO-MCNC: 107 MG/DL (ref 65–117)
GLUCOSE BLD STRIP.AUTO-MCNC: 108 MG/DL (ref 65–117)
GLUCOSE BLD STRIP.AUTO-MCNC: 109 MG/DL (ref 65–117)
GLUCOSE BLD STRIP.AUTO-MCNC: 110 MG/DL (ref 65–117)
GLUCOSE BLD STRIP.AUTO-MCNC: 111 MG/DL (ref 65–117)
GLUCOSE BLD STRIP.AUTO-MCNC: 111 MG/DL (ref 65–117)
GLUCOSE BLD STRIP.AUTO-MCNC: 112 MG/DL (ref 65–117)
GLUCOSE BLD STRIP.AUTO-MCNC: 112 MG/DL (ref 65–117)
GLUCOSE BLD STRIP.AUTO-MCNC: 113 MG/DL (ref 65–117)
GLUCOSE BLD STRIP.AUTO-MCNC: 114 MG/DL (ref 65–117)
GLUCOSE BLD STRIP.AUTO-MCNC: 115 MG/DL (ref 65–117)
GLUCOSE BLD STRIP.AUTO-MCNC: 116 MG/DL (ref 65–117)
GLUCOSE BLD STRIP.AUTO-MCNC: 117 MG/DL (ref 65–117)
GLUCOSE BLD STRIP.AUTO-MCNC: 118 MG/DL (ref 65–117)
GLUCOSE BLD STRIP.AUTO-MCNC: 119 MG/DL (ref 65–117)
GLUCOSE BLD STRIP.AUTO-MCNC: 120 MG/DL (ref 65–117)
GLUCOSE BLD STRIP.AUTO-MCNC: 121 MG/DL (ref 65–117)
GLUCOSE BLD STRIP.AUTO-MCNC: 122 MG/DL (ref 65–117)
GLUCOSE BLD STRIP.AUTO-MCNC: 123 MG/DL (ref 65–117)
GLUCOSE BLD STRIP.AUTO-MCNC: 123 MG/DL (ref 65–117)
GLUCOSE BLD STRIP.AUTO-MCNC: 124 MG/DL (ref 65–117)
GLUCOSE BLD STRIP.AUTO-MCNC: 124 MG/DL (ref 65–117)
GLUCOSE BLD STRIP.AUTO-MCNC: 125 MG/DL (ref 65–117)
GLUCOSE BLD STRIP.AUTO-MCNC: 126 MG/DL (ref 65–117)
GLUCOSE BLD STRIP.AUTO-MCNC: 127 MG/DL (ref 65–117)
GLUCOSE BLD STRIP.AUTO-MCNC: 128 MG/DL (ref 65–117)
GLUCOSE BLD STRIP.AUTO-MCNC: 129 MG/DL (ref 65–117)
GLUCOSE BLD STRIP.AUTO-MCNC: 130 MG/DL (ref 65–117)
GLUCOSE BLD STRIP.AUTO-MCNC: 131 MG/DL (ref 65–117)
GLUCOSE BLD STRIP.AUTO-MCNC: 132 MG/DL (ref 65–117)
GLUCOSE BLD STRIP.AUTO-MCNC: 133 MG/DL (ref 65–117)
GLUCOSE BLD STRIP.AUTO-MCNC: 134 MG/DL (ref 65–117)
GLUCOSE BLD STRIP.AUTO-MCNC: 135 MG/DL (ref 65–117)
GLUCOSE BLD STRIP.AUTO-MCNC: 136 MG/DL (ref 65–117)
GLUCOSE BLD STRIP.AUTO-MCNC: 137 MG/DL (ref 65–117)
GLUCOSE BLD STRIP.AUTO-MCNC: 138 MG/DL (ref 65–117)
GLUCOSE BLD STRIP.AUTO-MCNC: 139 MG/DL (ref 65–117)
GLUCOSE BLD STRIP.AUTO-MCNC: 140 MG/DL (ref 65–117)
GLUCOSE BLD STRIP.AUTO-MCNC: 140 MG/DL (ref 65–117)
GLUCOSE BLD STRIP.AUTO-MCNC: 141 MG/DL (ref 65–117)
GLUCOSE BLD STRIP.AUTO-MCNC: 141 MG/DL (ref 65–117)
GLUCOSE BLD STRIP.AUTO-MCNC: 142 MG/DL (ref 65–117)
GLUCOSE BLD STRIP.AUTO-MCNC: 143 MG/DL (ref 65–117)
GLUCOSE BLD STRIP.AUTO-MCNC: 143 MG/DL (ref 65–117)
GLUCOSE BLD STRIP.AUTO-MCNC: 144 MG/DL (ref 65–117)
GLUCOSE BLD STRIP.AUTO-MCNC: 144 MG/DL (ref 74–106)
GLUCOSE BLD STRIP.AUTO-MCNC: 146 MG/DL (ref 65–117)
GLUCOSE BLD STRIP.AUTO-MCNC: 147 MG/DL (ref 65–117)
GLUCOSE BLD STRIP.AUTO-MCNC: 148 MG/DL (ref 65–117)
GLUCOSE BLD STRIP.AUTO-MCNC: 149 MG/DL (ref 65–117)
GLUCOSE BLD STRIP.AUTO-MCNC: 150 MG/DL (ref 65–117)
GLUCOSE BLD STRIP.AUTO-MCNC: 150 MG/DL (ref 65–117)
GLUCOSE BLD STRIP.AUTO-MCNC: 151 MG/DL (ref 65–117)
GLUCOSE BLD STRIP.AUTO-MCNC: 152 MG/DL (ref 65–117)
GLUCOSE BLD STRIP.AUTO-MCNC: 153 MG/DL (ref 65–117)
GLUCOSE BLD STRIP.AUTO-MCNC: 153 MG/DL (ref 65–117)
GLUCOSE BLD STRIP.AUTO-MCNC: 154 MG/DL (ref 65–117)
GLUCOSE BLD STRIP.AUTO-MCNC: 155 MG/DL (ref 65–117)
GLUCOSE BLD STRIP.AUTO-MCNC: 156 MG/DL (ref 65–117)
GLUCOSE BLD STRIP.AUTO-MCNC: 157 MG/DL (ref 65–117)
GLUCOSE BLD STRIP.AUTO-MCNC: 158 MG/DL (ref 65–117)
GLUCOSE BLD STRIP.AUTO-MCNC: 158 MG/DL (ref 65–117)
GLUCOSE BLD STRIP.AUTO-MCNC: 159 MG/DL (ref 65–117)
GLUCOSE BLD STRIP.AUTO-MCNC: 159 MG/DL (ref 65–117)
GLUCOSE BLD STRIP.AUTO-MCNC: 160 MG/DL (ref 65–117)
GLUCOSE BLD STRIP.AUTO-MCNC: 161 MG/DL (ref 65–117)
GLUCOSE BLD STRIP.AUTO-MCNC: 162 MG/DL (ref 65–117)
GLUCOSE BLD STRIP.AUTO-MCNC: 163 MG/DL (ref 65–117)
GLUCOSE BLD STRIP.AUTO-MCNC: 164 MG/DL (ref 65–117)
GLUCOSE BLD STRIP.AUTO-MCNC: 165 MG/DL (ref 65–117)
GLUCOSE BLD STRIP.AUTO-MCNC: 165 MG/DL (ref 65–117)
GLUCOSE BLD STRIP.AUTO-MCNC: 166 MG/DL (ref 65–117)
GLUCOSE BLD STRIP.AUTO-MCNC: 166 MG/DL (ref 65–117)
GLUCOSE BLD STRIP.AUTO-MCNC: 167 MG/DL (ref 65–117)
GLUCOSE BLD STRIP.AUTO-MCNC: 168 MG/DL (ref 65–117)
GLUCOSE BLD STRIP.AUTO-MCNC: 169 MG/DL (ref 65–117)
GLUCOSE BLD STRIP.AUTO-MCNC: 170 MG/DL (ref 65–117)
GLUCOSE BLD STRIP.AUTO-MCNC: 172 MG/DL (ref 65–117)
GLUCOSE BLD STRIP.AUTO-MCNC: 172 MG/DL (ref 65–117)
GLUCOSE BLD STRIP.AUTO-MCNC: 175 MG/DL (ref 65–117)
GLUCOSE BLD STRIP.AUTO-MCNC: 176 MG/DL (ref 65–117)
GLUCOSE BLD STRIP.AUTO-MCNC: 177 MG/DL (ref 65–117)
GLUCOSE BLD STRIP.AUTO-MCNC: 178 MG/DL (ref 65–117)
GLUCOSE BLD STRIP.AUTO-MCNC: 178 MG/DL (ref 65–117)
GLUCOSE BLD STRIP.AUTO-MCNC: 179 MG/DL (ref 65–117)
GLUCOSE BLD STRIP.AUTO-MCNC: 179 MG/DL (ref 65–117)
GLUCOSE BLD STRIP.AUTO-MCNC: 180 MG/DL (ref 65–117)
GLUCOSE BLD STRIP.AUTO-MCNC: 181 MG/DL (ref 65–117)
GLUCOSE BLD STRIP.AUTO-MCNC: 182 MG/DL (ref 65–117)
GLUCOSE BLD STRIP.AUTO-MCNC: 182 MG/DL (ref 65–117)
GLUCOSE BLD STRIP.AUTO-MCNC: 183 MG/DL (ref 65–117)
GLUCOSE BLD STRIP.AUTO-MCNC: 184 MG/DL (ref 65–117)
GLUCOSE BLD STRIP.AUTO-MCNC: 184 MG/DL (ref 65–117)
GLUCOSE BLD STRIP.AUTO-MCNC: 185 MG/DL (ref 65–117)
GLUCOSE BLD STRIP.AUTO-MCNC: 186 MG/DL (ref 65–117)
GLUCOSE BLD STRIP.AUTO-MCNC: 189 MG/DL (ref 65–117)
GLUCOSE BLD STRIP.AUTO-MCNC: 190 MG/DL (ref 65–117)
GLUCOSE BLD STRIP.AUTO-MCNC: 190 MG/DL (ref 65–117)
GLUCOSE BLD STRIP.AUTO-MCNC: 192 MG/DL (ref 65–117)
GLUCOSE BLD STRIP.AUTO-MCNC: 192 MG/DL (ref 65–117)
GLUCOSE BLD STRIP.AUTO-MCNC: 194 MG/DL (ref 65–117)
GLUCOSE BLD STRIP.AUTO-MCNC: 197 MG/DL (ref 65–117)
GLUCOSE BLD STRIP.AUTO-MCNC: 198 MG/DL (ref 65–117)
GLUCOSE BLD STRIP.AUTO-MCNC: 199 MG/DL (ref 65–117)
GLUCOSE BLD STRIP.AUTO-MCNC: 199 MG/DL (ref 65–117)
GLUCOSE BLD STRIP.AUTO-MCNC: 200 MG/DL (ref 65–117)
GLUCOSE BLD STRIP.AUTO-MCNC: 201 MG/DL (ref 65–117)
GLUCOSE BLD STRIP.AUTO-MCNC: 202 MG/DL (ref 65–117)
GLUCOSE BLD STRIP.AUTO-MCNC: 204 MG/DL (ref 65–117)
GLUCOSE BLD STRIP.AUTO-MCNC: 204 MG/DL (ref 65–117)
GLUCOSE BLD STRIP.AUTO-MCNC: 205 MG/DL (ref 65–117)
GLUCOSE BLD STRIP.AUTO-MCNC: 206 MG/DL (ref 65–117)
GLUCOSE BLD STRIP.AUTO-MCNC: 206 MG/DL (ref 65–117)
GLUCOSE BLD STRIP.AUTO-MCNC: 207 MG/DL (ref 65–117)
GLUCOSE BLD STRIP.AUTO-MCNC: 209 MG/DL (ref 65–117)
GLUCOSE BLD STRIP.AUTO-MCNC: 210 MG/DL (ref 65–117)
GLUCOSE BLD STRIP.AUTO-MCNC: 210 MG/DL (ref 65–117)
GLUCOSE BLD STRIP.AUTO-MCNC: 211 MG/DL (ref 65–117)
GLUCOSE BLD STRIP.AUTO-MCNC: 212 MG/DL (ref 65–117)
GLUCOSE BLD STRIP.AUTO-MCNC: 213 MG/DL (ref 65–117)
GLUCOSE BLD STRIP.AUTO-MCNC: 214 MG/DL (ref 65–117)
GLUCOSE BLD STRIP.AUTO-MCNC: 215 MG/DL (ref 65–117)
GLUCOSE BLD STRIP.AUTO-MCNC: 215 MG/DL (ref 65–117)
GLUCOSE BLD STRIP.AUTO-MCNC: 216 MG/DL (ref 65–117)
GLUCOSE BLD STRIP.AUTO-MCNC: 217 MG/DL (ref 65–117)
GLUCOSE BLD STRIP.AUTO-MCNC: 217 MG/DL (ref 65–117)
GLUCOSE BLD STRIP.AUTO-MCNC: 219 MG/DL (ref 65–117)
GLUCOSE BLD STRIP.AUTO-MCNC: 219 MG/DL (ref 65–117)
GLUCOSE BLD STRIP.AUTO-MCNC: 221 MG/DL (ref 65–117)
GLUCOSE BLD STRIP.AUTO-MCNC: 223 MG/DL (ref 65–117)
GLUCOSE BLD STRIP.AUTO-MCNC: 223 MG/DL (ref 65–117)
GLUCOSE BLD STRIP.AUTO-MCNC: 225 MG/DL (ref 65–117)
GLUCOSE BLD STRIP.AUTO-MCNC: 228 MG/DL (ref 65–117)
GLUCOSE BLD STRIP.AUTO-MCNC: 230 MG/DL (ref 65–117)
GLUCOSE BLD STRIP.AUTO-MCNC: 235 MG/DL (ref 65–117)
GLUCOSE BLD STRIP.AUTO-MCNC: 236 MG/DL (ref 65–117)
GLUCOSE BLD STRIP.AUTO-MCNC: 239 MG/DL (ref 65–117)
GLUCOSE BLD STRIP.AUTO-MCNC: 241 MG/DL (ref 65–117)
GLUCOSE BLD STRIP.AUTO-MCNC: 245 MG/DL (ref 65–117)
GLUCOSE BLD STRIP.AUTO-MCNC: 246 MG/DL (ref 65–117)
GLUCOSE BLD STRIP.AUTO-MCNC: 247 MG/DL (ref 65–117)
GLUCOSE BLD STRIP.AUTO-MCNC: 253 MG/DL (ref 65–117)
GLUCOSE BLD STRIP.AUTO-MCNC: 257 MG/DL (ref 65–117)
GLUCOSE BLD STRIP.AUTO-MCNC: 260 MG/DL (ref 65–117)
GLUCOSE BLD STRIP.AUTO-MCNC: 265 MG/DL (ref 65–117)
GLUCOSE BLD STRIP.AUTO-MCNC: 269 MG/DL (ref 65–117)
GLUCOSE BLD STRIP.AUTO-MCNC: 272 MG/DL (ref 65–117)
GLUCOSE BLD STRIP.AUTO-MCNC: 273 MG/DL (ref 65–117)
GLUCOSE BLD STRIP.AUTO-MCNC: 287 MG/DL (ref 65–117)
GLUCOSE BLD STRIP.AUTO-MCNC: 291 MG/DL (ref 65–117)
GLUCOSE BLD STRIP.AUTO-MCNC: 293 MG/DL (ref 65–117)
GLUCOSE BLD STRIP.AUTO-MCNC: 299 MG/DL (ref 65–117)
GLUCOSE BLD STRIP.AUTO-MCNC: 308 MG/DL (ref 65–117)
GLUCOSE BLD STRIP.AUTO-MCNC: 308 MG/DL (ref 65–117)
GLUCOSE BLD STRIP.AUTO-MCNC: 309 MG/DL (ref 65–117)
GLUCOSE BLD STRIP.AUTO-MCNC: 315 MG/DL (ref 65–117)
GLUCOSE BLD STRIP.AUTO-MCNC: 323 MG/DL (ref 65–117)
GLUCOSE BLD STRIP.AUTO-MCNC: 334 MG/DL (ref 65–117)
GLUCOSE BLD STRIP.AUTO-MCNC: 341 MG/DL (ref 65–117)
GLUCOSE BLD STRIP.AUTO-MCNC: 342 MG/DL (ref 65–117)
GLUCOSE BLD STRIP.AUTO-MCNC: 354 MG/DL (ref 65–117)
GLUCOSE BLD STRIP.AUTO-MCNC: 362 MG/DL (ref 65–117)
GLUCOSE BLD STRIP.AUTO-MCNC: 374 MG/DL (ref 65–117)
GLUCOSE BLD STRIP.AUTO-MCNC: 385 MG/DL (ref 65–117)
GLUCOSE BLD STRIP.AUTO-MCNC: 385 MG/DL (ref 65–117)
GLUCOSE BLD STRIP.AUTO-MCNC: 398 MG/DL (ref 65–117)
GLUCOSE BLD STRIP.AUTO-MCNC: 409 MG/DL (ref 65–117)
GLUCOSE BLD STRIP.AUTO-MCNC: 415 MG/DL (ref 65–117)
GLUCOSE BLD STRIP.AUTO-MCNC: 505 MG/DL (ref 65–117)
GLUCOSE BLD STRIP.AUTO-MCNC: 546 MG/DL (ref 65–117)
GLUCOSE BLD STRIP.AUTO-MCNC: 55 MG/DL (ref 65–117)
GLUCOSE BLD STRIP.AUTO-MCNC: 59 MG/DL (ref 65–117)
GLUCOSE BLD STRIP.AUTO-MCNC: 60 MG/DL (ref 65–117)
GLUCOSE BLD STRIP.AUTO-MCNC: 65 MG/DL (ref 65–117)
GLUCOSE BLD STRIP.AUTO-MCNC: 65 MG/DL (ref 65–117)
GLUCOSE BLD STRIP.AUTO-MCNC: 66 MG/DL (ref 65–117)
GLUCOSE BLD STRIP.AUTO-MCNC: 68 MG/DL (ref 65–117)
GLUCOSE BLD STRIP.AUTO-MCNC: 69 MG/DL (ref 65–117)
GLUCOSE BLD STRIP.AUTO-MCNC: 72 MG/DL (ref 65–117)
GLUCOSE BLD STRIP.AUTO-MCNC: 73 MG/DL (ref 65–117)
GLUCOSE BLD STRIP.AUTO-MCNC: 76 MG/DL (ref 65–117)
GLUCOSE BLD STRIP.AUTO-MCNC: 77 MG/DL (ref 65–117)
GLUCOSE BLD STRIP.AUTO-MCNC: 77 MG/DL (ref 65–117)
GLUCOSE BLD STRIP.AUTO-MCNC: 78 MG/DL (ref 65–117)
GLUCOSE BLD STRIP.AUTO-MCNC: 79 MG/DL (ref 65–117)
GLUCOSE BLD STRIP.AUTO-MCNC: 80 MG/DL (ref 65–117)
GLUCOSE BLD STRIP.AUTO-MCNC: 81 MG/DL (ref 65–117)
GLUCOSE BLD STRIP.AUTO-MCNC: 82 MG/DL (ref 65–117)
GLUCOSE BLD STRIP.AUTO-MCNC: 83 MG/DL (ref 65–117)
GLUCOSE BLD STRIP.AUTO-MCNC: 84 MG/DL (ref 65–117)
GLUCOSE BLD STRIP.AUTO-MCNC: 85 MG/DL (ref 65–117)
GLUCOSE BLD STRIP.AUTO-MCNC: 86 MG/DL (ref 65–117)
GLUCOSE BLD STRIP.AUTO-MCNC: 87 MG/DL (ref 65–117)
GLUCOSE BLD STRIP.AUTO-MCNC: 88 MG/DL (ref 65–117)
GLUCOSE BLD STRIP.AUTO-MCNC: 89 MG/DL (ref 65–117)
GLUCOSE BLD STRIP.AUTO-MCNC: 90 MG/DL (ref 65–117)
GLUCOSE BLD STRIP.AUTO-MCNC: 91 MG/DL (ref 65–117)
GLUCOSE BLD STRIP.AUTO-MCNC: 92 MG/DL (ref 65–117)
GLUCOSE BLD STRIP.AUTO-MCNC: 93 MG/DL (ref 65–117)
GLUCOSE BLD STRIP.AUTO-MCNC: 94 MG/DL (ref 65–117)
GLUCOSE BLD STRIP.AUTO-MCNC: 95 MG/DL (ref 65–117)
GLUCOSE BLD STRIP.AUTO-MCNC: 95 MG/DL (ref 65–117)
GLUCOSE BLD STRIP.AUTO-MCNC: 96 MG/DL (ref 65–117)
GLUCOSE BLD STRIP.AUTO-MCNC: 96 MG/DL (ref 65–117)
GLUCOSE BLD STRIP.AUTO-MCNC: 97 MG/DL (ref 65–117)
GLUCOSE BLD STRIP.AUTO-MCNC: 98 MG/DL (ref 65–117)
GLUCOSE BLD STRIP.AUTO-MCNC: 99 MG/DL (ref 65–117)
GLUCOSE BLD STRIP.AUTO-MCNC: 99 MG/DL (ref 65–117)
GLUCOSE BLD STRIP.AUTO-MCNC: NORMAL MG/DL (ref 65–117)
GLUCOSE SERPL-MCNC: 102 MG/DL (ref 65–100)
GLUCOSE SERPL-MCNC: 105 MG/DL (ref 65–100)
GLUCOSE SERPL-MCNC: 108 MG/DL (ref 65–100)
GLUCOSE SERPL-MCNC: 111 MG/DL (ref 65–100)
GLUCOSE SERPL-MCNC: 115 MG/DL (ref 65–100)
GLUCOSE SERPL-MCNC: 117 MG/DL (ref 65–100)
GLUCOSE SERPL-MCNC: 118 MG/DL (ref 65–100)
GLUCOSE SERPL-MCNC: 120 MG/DL (ref 65–100)
GLUCOSE SERPL-MCNC: 120 MG/DL (ref 65–100)
GLUCOSE SERPL-MCNC: 122 MG/DL (ref 65–100)
GLUCOSE SERPL-MCNC: 124 MG/DL (ref 65–100)
GLUCOSE SERPL-MCNC: 125 MG/DL (ref 65–100)
GLUCOSE SERPL-MCNC: 126 MG/DL (ref 65–100)
GLUCOSE SERPL-MCNC: 126 MG/DL (ref 65–100)
GLUCOSE SERPL-MCNC: 129 MG/DL (ref 65–100)
GLUCOSE SERPL-MCNC: 130 MG/DL (ref 65–100)
GLUCOSE SERPL-MCNC: 132 MG/DL (ref 65–100)
GLUCOSE SERPL-MCNC: 133 MG/DL (ref 65–100)
GLUCOSE SERPL-MCNC: 133 MG/DL (ref 65–100)
GLUCOSE SERPL-MCNC: 134 MG/DL (ref 65–100)
GLUCOSE SERPL-MCNC: 134 MG/DL (ref 65–100)
GLUCOSE SERPL-MCNC: 135 MG/DL (ref 65–100)
GLUCOSE SERPL-MCNC: 139 MG/DL (ref 65–100)
GLUCOSE SERPL-MCNC: 140 MG/DL (ref 65–100)
GLUCOSE SERPL-MCNC: 140 MG/DL (ref 65–100)
GLUCOSE SERPL-MCNC: 142 MG/DL (ref 65–100)
GLUCOSE SERPL-MCNC: 142 MG/DL (ref 65–100)
GLUCOSE SERPL-MCNC: 145 MG/DL (ref 65–100)
GLUCOSE SERPL-MCNC: 147 MG/DL (ref 65–100)
GLUCOSE SERPL-MCNC: 150 MG/DL (ref 65–100)
GLUCOSE SERPL-MCNC: 152 MG/DL (ref 65–100)
GLUCOSE SERPL-MCNC: 155 MG/DL (ref 65–100)
GLUCOSE SERPL-MCNC: 158 MG/DL (ref 65–100)
GLUCOSE SERPL-MCNC: 159 MG/DL (ref 65–100)
GLUCOSE SERPL-MCNC: 159 MG/DL (ref 65–100)
GLUCOSE SERPL-MCNC: 160 MG/DL (ref 65–100)
GLUCOSE SERPL-MCNC: 162 MG/DL (ref 65–100)
GLUCOSE SERPL-MCNC: 164 MG/DL (ref 65–100)
GLUCOSE SERPL-MCNC: 165 MG/DL (ref 65–100)
GLUCOSE SERPL-MCNC: 172 MG/DL (ref 65–100)
GLUCOSE SERPL-MCNC: 173 MG/DL (ref 65–100)
GLUCOSE SERPL-MCNC: 174 MG/DL (ref 65–100)
GLUCOSE SERPL-MCNC: 174 MG/DL (ref 65–100)
GLUCOSE SERPL-MCNC: 179 MG/DL (ref 65–100)
GLUCOSE SERPL-MCNC: 180 MG/DL (ref 65–100)
GLUCOSE SERPL-MCNC: 189 MG/DL (ref 65–100)
GLUCOSE SERPL-MCNC: 189 MG/DL (ref 65–100)
GLUCOSE SERPL-MCNC: 191 MG/DL (ref 65–100)
GLUCOSE SERPL-MCNC: 197 MG/DL (ref 65–100)
GLUCOSE SERPL-MCNC: 198 MG/DL (ref 65–100)
GLUCOSE SERPL-MCNC: 199 MG/DL (ref 65–100)
GLUCOSE SERPL-MCNC: 200 MG/DL (ref 65–100)
GLUCOSE SERPL-MCNC: 201 MG/DL (ref 65–100)
GLUCOSE SERPL-MCNC: 205 MG/DL (ref 65–100)
GLUCOSE SERPL-MCNC: 206 MG/DL (ref 65–100)
GLUCOSE SERPL-MCNC: 214 MG/DL (ref 65–100)
GLUCOSE SERPL-MCNC: 215 MG/DL (ref 65–100)
GLUCOSE SERPL-MCNC: 221 MG/DL (ref 65–100)
GLUCOSE SERPL-MCNC: 223 MG/DL (ref 65–100)
GLUCOSE SERPL-MCNC: 226 MG/DL (ref 65–100)
GLUCOSE SERPL-MCNC: 237 MG/DL (ref 65–100)
GLUCOSE SERPL-MCNC: 261 MG/DL (ref 65–100)
GLUCOSE SERPL-MCNC: 267 MG/DL (ref 65–100)
GLUCOSE SERPL-MCNC: 280 MG/DL (ref 65–100)
GLUCOSE SERPL-MCNC: 289 MG/DL (ref 65–100)
GLUCOSE SERPL-MCNC: 380 MG/DL (ref 65–100)
GLUCOSE SERPL-MCNC: 79 MG/DL (ref 65–100)
GLUCOSE SERPL-MCNC: 81 MG/DL (ref 65–100)
GLUCOSE SERPL-MCNC: 83 MG/DL (ref 65–100)
GLUCOSE SERPL-MCNC: 84 MG/DL (ref 65–100)
GLUCOSE SERPL-MCNC: 92 MG/DL (ref 65–100)
GLUCOSE SERPL-MCNC: 93 MG/DL (ref 65–100)
GLUCOSE SERPL-MCNC: 93 MG/DL (ref 65–100)
GLUCOSE SERPL-MCNC: 94 MG/DL (ref 65–100)
GLUCOSE SERPL-MCNC: 97 MG/DL (ref 65–100)
GLUCOSE UR STRIP.AUTO-MCNC: 100 MG/DL
GLUCOSE UR STRIP.AUTO-MCNC: 500 MG/DL
GLUCOSE UR STRIP.AUTO-MCNC: NEGATIVE MG/DL
GRAM STN SPEC: NORMAL
HADV DNA SPEC QL NAA+PROBE: NOT DETECTED
HAPTOGLOB SERPL-MCNC: 108 MG/DL (ref 30–200)
HBV SURFACE AB SER QL: NONREACTIVE
HBV SURFACE AB SER-ACNC: <3.1 MIU/ML
HBV SURFACE AG SER QL: <0.1 INDEX
HBV SURFACE AG SER QL: <0.1 INDEX
HBV SURFACE AG SER QL: NEGATIVE
HBV SURFACE AG SER QL: NEGATIVE
HCO3 BLD-SCNC: 13.3 MMOL/L (ref 22–26)
HCO3 BLD-SCNC: 14.9 MMOL/L (ref 22–26)
HCO3 BLD-SCNC: 15.4 MMOL/L (ref 22–26)
HCO3 BLD-SCNC: 16.6 MMOL/L (ref 22–26)
HCO3 BLD-SCNC: 16.8 MMOL/L (ref 22–26)
HCO3 BLD-SCNC: 17.2 MMOL/L (ref 22–26)
HCO3 BLD-SCNC: 18.7 MMOL/L (ref 22–26)
HCO3 BLD-SCNC: 18.9 MMOL/L (ref 22–26)
HCO3 BLD-SCNC: 19.3 MMOL/L (ref 22–26)
HCO3 BLD-SCNC: 19.8 MMOL/L (ref 22–26)
HCO3 BLD-SCNC: 21 MMOL/L (ref 22–26)
HCO3 BLD-SCNC: 22 MMOL/L (ref 22–26)
HCO3 BLD-SCNC: 22.4 MMOL/L (ref 22–26)
HCO3 BLD-SCNC: 22.8 MMOL/L (ref 22–26)
HCO3 BLD-SCNC: 22.9 MMOL/L (ref 22–26)
HCO3 BLD-SCNC: 23.3 MMOL/L (ref 22–26)
HCO3 BLD-SCNC: 23.9 MMOL/L (ref 22–26)
HCO3 BLD-SCNC: 24.1 MMOL/L (ref 22–26)
HCO3 BLD-SCNC: 24.4 MMOL/L (ref 22–26)
HCO3 BLD-SCNC: 24.6 MMOL/L (ref 22–26)
HCO3 BLD-SCNC: 24.8 MMOL/L (ref 22–26)
HCO3 BLD-SCNC: 24.9 MMOL/L (ref 22–26)
HCO3 BLDA-SCNC: 18 MMOL/L
HCO3 BLDV-SCNC: 17 MMOL/L (ref 23–28)
HCO3 BLDV-SCNC: 18 MMOL/L (ref 23–28)
HCO3 BLDV-SCNC: 20 MMOL/L (ref 23–28)
HCO3 BLDV-SCNC: 20 MMOL/L (ref 23–28)
HCOV 229E RNA SPEC QL NAA+PROBE: NOT DETECTED
HCOV HKU1 RNA SPEC QL NAA+PROBE: NOT DETECTED
HCOV NL63 RNA SPEC QL NAA+PROBE: NOT DETECTED
HCOV OC43 RNA SPEC QL NAA+PROBE: NOT DETECTED
HCT VFR BLD AUTO: 21.9 % (ref 36.6–50.3)
HCT VFR BLD AUTO: 22.1 % (ref 36.6–50.3)
HCT VFR BLD AUTO: 22.3 % (ref 36.6–50.3)
HCT VFR BLD AUTO: 22.8 % (ref 36.6–50.3)
HCT VFR BLD AUTO: 23.2 % (ref 36.6–50.3)
HCT VFR BLD AUTO: 23.3 % (ref 36.6–50.3)
HCT VFR BLD AUTO: 23.4 % (ref 36.6–50.3)
HCT VFR BLD AUTO: 23.4 % (ref 36.6–50.3)
HCT VFR BLD AUTO: 23.5 % (ref 36.6–50.3)
HCT VFR BLD AUTO: 23.9 % (ref 36.6–50.3)
HCT VFR BLD AUTO: 23.9 % (ref 36.6–50.3)
HCT VFR BLD AUTO: 24.2 % (ref 36.6–50.3)
HCT VFR BLD AUTO: 24.5 % (ref 36.6–50.3)
HCT VFR BLD AUTO: 24.6 % (ref 36.6–50.3)
HCT VFR BLD AUTO: 24.6 % (ref 36.6–50.3)
HCT VFR BLD AUTO: 25.1 % (ref 36.6–50.3)
HCT VFR BLD AUTO: 25.2 % (ref 36.6–50.3)
HCT VFR BLD AUTO: 25.3 % (ref 36.6–50.3)
HCT VFR BLD AUTO: 25.3 % (ref 36.6–50.3)
HCT VFR BLD AUTO: 25.5 % (ref 36.6–50.3)
HCT VFR BLD AUTO: 25.6 % (ref 36.6–50.3)
HCT VFR BLD AUTO: 25.7 % (ref 36.6–50.3)
HCT VFR BLD AUTO: 25.7 % (ref 36.6–50.3)
HCT VFR BLD AUTO: 25.8 % (ref 36.6–50.3)
HCT VFR BLD AUTO: 25.8 % (ref 36.6–50.3)
HCT VFR BLD AUTO: 25.9 % (ref 36.6–50.3)
HCT VFR BLD AUTO: 25.9 % (ref 36.6–50.3)
HCT VFR BLD AUTO: 26 % (ref 36.6–50.3)
HCT VFR BLD AUTO: 26.1 % (ref 36.6–50.3)
HCT VFR BLD AUTO: 26.1 % (ref 36.6–50.3)
HCT VFR BLD AUTO: 26.2 % (ref 36.6–50.3)
HCT VFR BLD AUTO: 26.3 % (ref 36.6–50.3)
HCT VFR BLD AUTO: 26.4 % (ref 36.6–50.3)
HCT VFR BLD AUTO: 26.5 % (ref 36.6–50.3)
HCT VFR BLD AUTO: 26.5 % (ref 36.6–50.3)
HCT VFR BLD AUTO: 27.1 % (ref 36.6–50.3)
HCT VFR BLD AUTO: 27.2 % (ref 36.6–50.3)
HCT VFR BLD AUTO: 27.3 % (ref 36.6–50.3)
HCT VFR BLD AUTO: 27.6 % (ref 36.6–50.3)
HCT VFR BLD AUTO: 27.7 % (ref 36.6–50.3)
HCT VFR BLD AUTO: 27.9 % (ref 36.6–50.3)
HCT VFR BLD AUTO: 28.4 % (ref 36.6–50.3)
HCT VFR BLD AUTO: 28.4 % (ref 36.6–50.3)
HCT VFR BLD AUTO: 28.7 % (ref 36.6–50.3)
HCT VFR BLD AUTO: 29.1 % (ref 36.6–50.3)
HCT VFR BLD AUTO: 29.2 % (ref 36.6–50.3)
HCT VFR BLD AUTO: 29.3 % (ref 36.6–50.3)
HCT VFR BLD AUTO: 29.4 % (ref 36.6–50.3)
HCT VFR BLD AUTO: 29.5 % (ref 36.6–50.3)
HCT VFR BLD AUTO: 29.5 % (ref 36.6–50.3)
HCT VFR BLD AUTO: 29.8 % (ref 36.6–50.3)
HCT VFR BLD AUTO: 29.9 % (ref 36.6–50.3)
HCT VFR BLD AUTO: 30 % (ref 36.6–50.3)
HCT VFR BLD AUTO: 30 % (ref 36.6–50.3)
HCT VFR BLD AUTO: 30.4 % (ref 36.6–50.3)
HCT VFR BLD AUTO: 30.6 % (ref 36.6–50.3)
HCT VFR BLD AUTO: 30.9 % (ref 36.6–50.3)
HCT VFR BLD AUTO: 32.2 % (ref 36.6–50.3)
HCT VFR BLD AUTO: 32.5 % (ref 36.6–50.3)
HCT VFR BLD AUTO: 32.9 % (ref 36.6–50.3)
HCT VFR BLD AUTO: 34.4 % (ref 36.6–50.3)
HCT VFR BLD AUTO: 34.9 % (ref 36.6–50.3)
HCT VFR BLD AUTO: 35.7 % (ref 36.6–50.3)
HCT VFR BLD AUTO: 35.8 % (ref 36.6–50.3)
HCT VFR BLD AUTO: 37.9 % (ref 36.6–50.3)
HCT VFR BLD AUTO: 39.3 % (ref 36.6–50.3)
HCT VFR BLD AUTO: 39.8 % (ref 36.6–50.3)
HCT VFR BLD AUTO: 43.6 % (ref 36.6–50.3)
HDLC SERPL-MCNC: 7 MG/DL
HDLC SERPL: ABNORMAL (ref 0–5)
HGB BLD-MCNC: 10.1 G/DL (ref 12.1–17)
HGB BLD-MCNC: 10.2 G/DL (ref 12.1–17)
HGB BLD-MCNC: 10.2 G/DL (ref 12.1–17)
HGB BLD-MCNC: 11 G/DL (ref 12.1–17)
HGB BLD-MCNC: 11.5 G/DL (ref 12.1–17)
HGB BLD-MCNC: 12.3 G/DL (ref 12.1–17)
HGB BLD-MCNC: 12.5 G/DL (ref 12.1–17)
HGB BLD-MCNC: 13.6 G/DL (ref 12.1–17)
HGB BLD-MCNC: 7 G/DL (ref 12.1–17)
HGB BLD-MCNC: 7.1 G/DL (ref 12.1–17)
HGB BLD-MCNC: 7.1 G/DL (ref 12.1–17)
HGB BLD-MCNC: 7.2 G/DL (ref 12.1–17)
HGB BLD-MCNC: 7.3 G/DL (ref 12.1–17)
HGB BLD-MCNC: 7.3 G/DL (ref 12.1–17)
HGB BLD-MCNC: 7.5 G/DL (ref 12.1–17)
HGB BLD-MCNC: 7.5 G/DL (ref 12.1–17)
HGB BLD-MCNC: 7.6 G/DL (ref 12.1–17)
HGB BLD-MCNC: 7.7 G/DL (ref 12.1–17)
HGB BLD-MCNC: 7.8 G/DL (ref 12.1–17)
HGB BLD-MCNC: 7.9 G/DL (ref 12.1–17)
HGB BLD-MCNC: 8 G/DL (ref 12.1–17)
HGB BLD-MCNC: 8.1 G/DL (ref 12.1–17)
HGB BLD-MCNC: 8.1 G/DL (ref 12.1–17)
HGB BLD-MCNC: 8.2 G/DL (ref 12.1–17)
HGB BLD-MCNC: 8.3 G/DL (ref 12.1–17)
HGB BLD-MCNC: 8.4 G/DL (ref 12.1–17)
HGB BLD-MCNC: 8.5 G/DL (ref 12.1–17)
HGB BLD-MCNC: 8.5 G/DL (ref 12.1–17)
HGB BLD-MCNC: 8.6 G/DL (ref 12.1–17)
HGB BLD-MCNC: 8.6 G/DL (ref 12.1–17)
HGB BLD-MCNC: 8.7 G/DL (ref 12.1–17)
HGB BLD-MCNC: 8.8 G/DL (ref 12.1–17)
HGB BLD-MCNC: 8.8 G/DL (ref 12.1–17)
HGB BLD-MCNC: 8.9 G/DL (ref 12.1–17)
HGB BLD-MCNC: 9 G/DL (ref 12.1–17)
HGB BLD-MCNC: 9.1 G/DL (ref 12.1–17)
HGB BLD-MCNC: 9.1 G/DL (ref 12.1–17)
HGB BLD-MCNC: 9.2 G/DL (ref 12.1–17)
HGB BLD-MCNC: 9.2 G/DL (ref 12.1–17)
HGB BLD-MCNC: 9.3 G/DL (ref 12.1–17)
HGB BLD-MCNC: 9.5 G/DL (ref 12.1–17)
HGB BLD-MCNC: 9.7 G/DL (ref 12.1–17)
HGB UR QL STRIP: ABNORMAL
HGB UR QL STRIP: NEGATIVE
HISTORY CHECKED?,CKHIST: NORMAL
HMPV RNA SPEC QL NAA+PROBE: NOT DETECTED
HPIV1 RNA SPEC QL NAA+PROBE: NOT DETECTED
HPIV2 RNA SPEC QL NAA+PROBE: NOT DETECTED
HPIV3 RNA SPEC QL NAA+PROBE: NOT DETECTED
HPIV4 RNA SPEC QL NAA+PROBE: NOT DETECTED
HYALINE CASTS URNS QL MICRO: ABNORMAL /LPF (ref 0–5)
IMM GRANULOCYTES # BLD AUTO: 0 K/UL
IMM GRANULOCYTES # BLD AUTO: 0 K/UL
IMM GRANULOCYTES # BLD AUTO: 0 K/UL (ref 0–0.04)
IMM GRANULOCYTES # BLD AUTO: 0.1 K/UL (ref 0–0.04)
IMM GRANULOCYTES # BLD AUTO: 0.2 K/UL (ref 0–0.04)
IMM GRANULOCYTES NFR BLD AUTO: 0 %
IMM GRANULOCYTES NFR BLD AUTO: 0 %
IMM GRANULOCYTES NFR BLD AUTO: 0 % (ref 0–0.5)
IMM GRANULOCYTES NFR BLD AUTO: 1 % (ref 0–0.5)
IMM GRANULOCYTES NFR BLD AUTO: 2 % (ref 0–0.5)
INR PPP: 1.2 (ref 0.9–1.1)
INR PPP: 1.3 (ref 0.9–1.1)
INR PPP: 1.4 (ref 0.9–1.1)
INR PPP: 1.5 (ref 0.9–1.1)
INR PPP: 1.6 (ref 0.9–1.1)
INR PPP: 1.7 (ref 0.9–1.1)
INR PPP: 1.8 (ref 0.9–1.1)
INR PPP: 1.9 (ref 0.9–1.1)
INR PPP: 2 (ref 0.9–1.1)
INR PPP: 2.1 (ref 0.9–1.1)
INR PPP: 2.2 (ref 0.9–1.1)
INR PPP: 2.6 (ref 0.9–1.1)
INR PPP: 3 (ref 0.9–1.1)
INR PPP: 3.1 (ref 0.9–1.1)
INR PPP: 3.4 (ref 0.9–1.1)
INR PPP: 3.9 (ref 0.9–1.1)
IRON SATN MFR SERPL: 17 % (ref 20–50)
IRON SATN MFR SERPL: 18 % (ref 20–50)
IRON SATN MFR SERPL: 24 % (ref 20–50)
IRON SATN MFR SERPL: 7 % (ref 20–50)
IRON SERPL-MCNC: 17 UG/DL (ref 35–150)
IRON SERPL-MCNC: 33 UG/DL (ref 35–150)
IRON SERPL-MCNC: 35 UG/DL (ref 35–150)
IRON SERPL-MCNC: 37 UG/DL (ref 35–150)
KETONES UR QL STRIP.AUTO: ABNORMAL MG/DL
KETONES UR QL STRIP.AUTO: NEGATIVE MG/DL
L PNEUMO1 AG UR QL IA: NEGATIVE
LACTATE BLD-SCNC: 2.12 MMOL/L (ref 0.4–2)
LACTATE BLD-SCNC: 2.24 MMOL/L (ref 0.4–2)
LACTATE SERPL-SCNC: 1.1 MMOL/L (ref 0.4–2)
LACTATE SERPL-SCNC: 1.3 MMOL/L (ref 0.4–2)
LACTATE SERPL-SCNC: 1.5 MMOL/L (ref 0.4–2)
LACTATE SERPL-SCNC: 1.5 MMOL/L (ref 0.4–2)
LACTATE SERPL-SCNC: 1.6 MMOL/L (ref 0.4–2)
LACTATE SERPL-SCNC: 1.7 MMOL/L (ref 0.4–2)
LACTATE SERPL-SCNC: 1.8 MMOL/L (ref 0.4–2)
LACTATE SERPL-SCNC: 1.9 MMOL/L (ref 0.4–2)
LACTATE SERPL-SCNC: 2 MMOL/L (ref 0.4–2)
LACTATE SERPL-SCNC: 2.1 MMOL/L (ref 0.4–2)
LACTATE SERPL-SCNC: 2.2 MMOL/L (ref 0.4–2)
LACTATE SERPL-SCNC: 2.3 MMOL/L (ref 0.4–2)
LACTATE SERPL-SCNC: 2.3 MMOL/L (ref 0.4–2)
LACTATE SERPL-SCNC: 2.5 MMOL/L (ref 0.4–2)
LACTATE SERPL-SCNC: 2.5 MMOL/L (ref 0.4–2)
LACTATE SERPL-SCNC: 2.6 MMOL/L (ref 0.4–2)
LACTATE SERPL-SCNC: 2.7 MMOL/L (ref 0.4–2)
LACTATE SERPL-SCNC: 2.9 MMOL/L (ref 0.4–2)
LACTATE SERPL-SCNC: 3 MMOL/L (ref 0.4–2)
LACTATE SERPL-SCNC: 3.1 MMOL/L (ref 0.4–2)
LACTATE SERPL-SCNC: 3.3 MMOL/L (ref 0.4–2)
LACTATE SERPL-SCNC: 3.7 MMOL/L (ref 0.4–2)
LACTATE SERPL-SCNC: 4.1 MMOL/L (ref 0.4–2)
LACTATE SERPL-SCNC: 5.2 MMOL/L (ref 0.4–2)
LACTATE SERPL-SCNC: 5.4 MMOL/L (ref 0.4–2)
LDH SERPL L TO P-CCNC: 1054 U/L (ref 85–241)
LDH SERPL L TO P-CCNC: 240 U/L (ref 85–241)
LDH SERPL L TO P-CCNC: 247 U/L (ref 85–241)
LDH SERPL L TO P-CCNC: 248 U/L (ref 85–241)
LDH SERPL L TO P-CCNC: 254 U/L (ref 85–241)
LDH SERPL L TO P-CCNC: 256 U/L (ref 85–241)
LDH SERPL L TO P-CCNC: 256 U/L (ref 85–241)
LDH SERPL L TO P-CCNC: 259 U/L (ref 85–241)
LDH SERPL L TO P-CCNC: 262 U/L (ref 85–241)
LDH SERPL L TO P-CCNC: 263 U/L (ref 85–241)
LDH SERPL L TO P-CCNC: 264 U/L (ref 85–241)
LDH SERPL L TO P-CCNC: 267 U/L (ref 85–241)
LDH SERPL L TO P-CCNC: 272 U/L (ref 85–241)
LDH SERPL L TO P-CCNC: 274 U/L (ref 85–241)
LDH SERPL L TO P-CCNC: 281 U/L (ref 85–241)
LDH SERPL L TO P-CCNC: 282 U/L (ref 85–241)
LDH SERPL L TO P-CCNC: 285 U/L (ref 85–241)
LDH SERPL L TO P-CCNC: 289 U/L (ref 85–241)
LDH SERPL L TO P-CCNC: 291 U/L (ref 85–241)
LDH SERPL L TO P-CCNC: 334 U/L (ref 85–241)
LDH SERPL L TO P-CCNC: 342 U/L (ref 85–241)
LDH SERPL L TO P-CCNC: 355 U/L (ref 85–241)
LDH SERPL L TO P-CCNC: 357 U/L (ref 85–241)
LDH SERPL L TO P-CCNC: 399 U/L (ref 85–241)
LDH SERPL L TO P-CCNC: 401 U/L (ref 85–241)
LDH SERPL L TO P-CCNC: 419 U/L (ref 85–241)
LDH SERPL L TO P-CCNC: 442 U/L (ref 85–241)
LDH SERPL L TO P-CCNC: 566 U/L (ref 85–241)
LDH SERPL L TO P-CCNC: 573 U/L (ref 85–241)
LDH SERPL L TO P-CCNC: 582 U/L (ref 85–241)
LDH SERPL L TO P-CCNC: 586 U/L (ref 85–241)
LDH SERPL L TO P-CCNC: 594 U/L (ref 85–241)
LDH SERPL L TO P-CCNC: 598 U/L (ref 85–241)
LDH SERPL L TO P-CCNC: 612 U/L (ref 85–241)
LDH SERPL L TO P-CCNC: 623 U/L (ref 85–241)
LDH SERPL L TO P-CCNC: 637 U/L (ref 85–241)
LDH SERPL L TO P-CCNC: 696 U/L (ref 85–241)
LDH SERPL L TO P-CCNC: 767 U/L (ref 85–241)
LDH SERPL L TO P-CCNC: 769 U/L (ref 85–241)
LDH SERPL L TO P-CCNC: 874 U/L (ref 85–241)
LDH SERPL L TO P-CCNC: 977 U/L (ref 85–241)
LDLC SERPL CALC-MCNC: ABNORMAL MG/DL (ref 0–100)
LEUKOCYTE ESTERASE UR QL STRIP.AUTO: ABNORMAL
LEUKOCYTE ESTERASE UR QL STRIP.AUTO: NEGATIVE
LIPASE SERPL-CCNC: 1345 U/L (ref 73–393)
LIPASE SERPL-CCNC: 1544 U/L (ref 73–393)
LIPASE SERPL-CCNC: 2677 U/L (ref 73–393)
LIPASE SERPL-CCNC: >3000 U/L (ref 73–393)
LYMPHOCYTES # BLD: 0.4 K/UL (ref 0.8–3.5)
LYMPHOCYTES # BLD: 0.5 K/UL (ref 0.8–3.5)
LYMPHOCYTES # BLD: 0.6 K/UL (ref 0.8–3.5)
LYMPHOCYTES # BLD: 0.7 K/UL (ref 0.8–3.5)
LYMPHOCYTES # BLD: 0.8 K/UL (ref 0.8–3.5)
LYMPHOCYTES # BLD: 0.9 K/UL (ref 0.8–3.5)
LYMPHOCYTES # BLD: 1 K/UL (ref 0.8–3.5)
LYMPHOCYTES # BLD: 1.1 K/UL (ref 0.8–3.5)
LYMPHOCYTES # BLD: 1.2 K/UL (ref 0.8–3.5)
LYMPHOCYTES # BLD: 1.3 K/UL (ref 0.8–3.5)
LYMPHOCYTES # BLD: 1.3 K/UL (ref 0.8–3.5)
LYMPHOCYTES # BLD: 1.4 K/UL (ref 0.8–3.5)
LYMPHOCYTES # BLD: 1.5 K/UL (ref 0.8–3.5)
LYMPHOCYTES # BLD: 1.7 K/UL (ref 0.8–3.5)
LYMPHOCYTES # BLD: 1.7 K/UL (ref 0.8–3.5)
LYMPHOCYTES # BLD: 1.8 K/UL (ref 0.8–3.5)
LYMPHOCYTES # BLD: 2.9 K/UL (ref 0.8–3.5)
LYMPHOCYTES NFR BLD: 10 % (ref 12–49)
LYMPHOCYTES NFR BLD: 11 % (ref 12–49)
LYMPHOCYTES NFR BLD: 12 % (ref 12–49)
LYMPHOCYTES NFR BLD: 13 % (ref 12–49)
LYMPHOCYTES NFR BLD: 14 % (ref 12–49)
LYMPHOCYTES NFR BLD: 15 % (ref 12–49)
LYMPHOCYTES NFR BLD: 16 % (ref 12–49)
LYMPHOCYTES NFR BLD: 17 % (ref 12–49)
LYMPHOCYTES NFR BLD: 19 % (ref 12–49)
LYMPHOCYTES NFR BLD: 19 % (ref 12–49)
LYMPHOCYTES NFR BLD: 20 % (ref 12–49)
LYMPHOCYTES NFR BLD: 21 % (ref 12–49)
LYMPHOCYTES NFR BLD: 22 % (ref 12–49)
LYMPHOCYTES NFR BLD: 23 % (ref 12–49)
LYMPHOCYTES NFR BLD: 27 % (ref 12–49)
LYMPHOCYTES NFR BLD: 32 % (ref 12–49)
LYMPHOCYTES NFR BLD: 33 % (ref 12–49)
LYMPHOCYTES NFR BLD: 6 % (ref 12–49)
LYMPHOCYTES NFR BLD: 7 % (ref 12–49)
LYMPHOCYTES NFR BLD: 8 % (ref 12–49)
LYMPHOCYTES NFR BLD: 9 % (ref 12–49)
M PNEUMO DNA SPEC QL NAA+PROBE: NOT DETECTED
M PNEUMO IGM SER IA-ACNC: NONREACTIVE
MAGNESIUM SERPL-MCNC: 1.3 MG/DL (ref 1.6–2.4)
MAGNESIUM SERPL-MCNC: 1.5 MG/DL (ref 1.6–2.4)
MAGNESIUM SERPL-MCNC: 1.7 MG/DL (ref 1.6–2.4)
MAGNESIUM SERPL-MCNC: 1.8 MG/DL (ref 1.6–2.4)
MAGNESIUM SERPL-MCNC: 1.9 MG/DL (ref 1.6–2.4)
MAGNESIUM SERPL-MCNC: 2 MG/DL (ref 1.6–2.4)
MAGNESIUM SERPL-MCNC: 2.1 MG/DL (ref 1.6–2.4)
MAGNESIUM SERPL-MCNC: 2.2 MG/DL (ref 1.6–2.4)
MAGNESIUM SERPL-MCNC: 2.2 MG/DL (ref 1.6–2.4)
MAGNESIUM SERPL-MCNC: 2.3 MG/DL (ref 1.6–2.4)
MAGNESIUM SERPL-MCNC: 2.3 MG/DL (ref 1.6–2.4)
MAGNESIUM SERPL-MCNC: 2.4 MG/DL (ref 1.6–2.4)
MAGNESIUM SERPL-MCNC: 2.5 MG/DL (ref 1.6–2.4)
MAGNESIUM SERPL-MCNC: 2.6 MG/DL (ref 1.6–2.4)
MAGNESIUM SERPL-MCNC: 2.7 MG/DL (ref 1.6–2.4)
MAGNESIUM SERPL-MCNC: 2.8 MG/DL (ref 1.6–2.4)
MAGNESIUM SERPL-MCNC: 2.9 MG/DL (ref 1.6–2.4)
MAGNESIUM SERPL-MCNC: 3 MG/DL (ref 1.6–2.4)
MAGNESIUM SERPL-MCNC: 3.1 MG/DL (ref 1.6–2.4)
MAGNESIUM SERPL-MCNC: 3.2 MG/DL (ref 1.6–2.4)
MAGNESIUM SERPL-MCNC: 3.3 MG/DL (ref 1.6–2.4)
MAGNESIUM SERPL-MCNC: 3.3 MG/DL (ref 1.6–2.4)
MAGNESIUM SERPL-MCNC: 3.4 MG/DL (ref 1.6–2.4)
MCH RBC QN AUTO: 22.6 PG (ref 26–34)
MCH RBC QN AUTO: 24.2 PG (ref 26–34)
MCH RBC QN AUTO: 24.4 PG (ref 26–34)
MCH RBC QN AUTO: 24.4 PG (ref 26–34)
MCH RBC QN AUTO: 24.5 PG (ref 26–34)
MCH RBC QN AUTO: 24.5 PG (ref 26–34)
MCH RBC QN AUTO: 24.6 PG (ref 26–34)
MCH RBC QN AUTO: 24.6 PG (ref 26–34)
MCH RBC QN AUTO: 24.7 PG (ref 26–34)
MCH RBC QN AUTO: 24.9 PG (ref 26–34)
MCH RBC QN AUTO: 25 PG (ref 26–34)
MCH RBC QN AUTO: 25.1 PG (ref 26–34)
MCH RBC QN AUTO: 25.2 PG (ref 26–34)
MCH RBC QN AUTO: 25.3 PG (ref 26–34)
MCH RBC QN AUTO: 25.6 PG (ref 26–34)
MCH RBC QN AUTO: 25.8 PG (ref 26–34)
MCH RBC QN AUTO: 26.7 PG (ref 26–34)
MCH RBC QN AUTO: 26.7 PG (ref 26–34)
MCH RBC QN AUTO: 27 PG (ref 26–34)
MCH RBC QN AUTO: 27.1 PG (ref 26–34)
MCH RBC QN AUTO: 27.2 PG (ref 26–34)
MCH RBC QN AUTO: 27.2 PG (ref 26–34)
MCH RBC QN AUTO: 27.3 PG (ref 26–34)
MCH RBC QN AUTO: 27.5 PG (ref 26–34)
MCH RBC QN AUTO: 27.7 PG (ref 26–34)
MCH RBC QN AUTO: 27.9 PG (ref 26–34)
MCH RBC QN AUTO: 28 PG (ref 26–34)
MCH RBC QN AUTO: 28.5 PG (ref 26–34)
MCH RBC QN AUTO: 29 PG (ref 26–34)
MCH RBC QN AUTO: 30.3 PG (ref 26–34)
MCH RBC QN AUTO: 30.5 PG (ref 26–34)
MCH RBC QN AUTO: 30.8 PG (ref 26–34)
MCH RBC QN AUTO: 30.9 PG (ref 26–34)
MCH RBC QN AUTO: 30.9 PG (ref 26–34)
MCH RBC QN AUTO: 31 PG (ref 26–34)
MCH RBC QN AUTO: 31 PG (ref 26–34)
MCH RBC QN AUTO: 31.1 PG (ref 26–34)
MCH RBC QN AUTO: 31.1 PG (ref 26–34)
MCH RBC QN AUTO: 31.2 PG (ref 26–34)
MCH RBC QN AUTO: 31.4 PG (ref 26–34)
MCH RBC QN AUTO: 31.5 PG (ref 26–34)
MCH RBC QN AUTO: 31.5 PG (ref 26–34)
MCH RBC QN AUTO: 31.7 PG (ref 26–34)
MCH RBC QN AUTO: 31.8 PG (ref 26–34)
MCH RBC QN AUTO: 31.8 PG (ref 26–34)
MCH RBC QN AUTO: 31.9 PG (ref 26–34)
MCH RBC QN AUTO: 31.9 PG (ref 26–34)
MCH RBC QN AUTO: 32.3 PG (ref 26–34)
MCH RBC QN AUTO: 32.3 PG (ref 26–34)
MCH RBC QN AUTO: 32.4 PG (ref 26–34)
MCH RBC QN AUTO: 32.5 PG (ref 26–34)
MCH RBC QN AUTO: 32.7 PG (ref 26–34)
MCH RBC QN AUTO: 32.8 PG (ref 26–34)
MCH RBC QN AUTO: 33.1 PG (ref 26–34)
MCHC RBC AUTO-ENTMCNC: 28.1 G/DL (ref 30–36.5)
MCHC RBC AUTO-ENTMCNC: 28.5 G/DL (ref 30–36.5)
MCHC RBC AUTO-ENTMCNC: 28.8 G/DL (ref 30–36.5)
MCHC RBC AUTO-ENTMCNC: 28.9 G/DL (ref 30–36.5)
MCHC RBC AUTO-ENTMCNC: 29 G/DL (ref 30–36.5)
MCHC RBC AUTO-ENTMCNC: 29.1 G/DL (ref 30–36.5)
MCHC RBC AUTO-ENTMCNC: 29.2 G/DL (ref 30–36.5)
MCHC RBC AUTO-ENTMCNC: 29.2 G/DL (ref 30–36.5)
MCHC RBC AUTO-ENTMCNC: 29.3 G/DL (ref 30–36.5)
MCHC RBC AUTO-ENTMCNC: 29.5 G/DL (ref 30–36.5)
MCHC RBC AUTO-ENTMCNC: 29.5 G/DL (ref 30–36.5)
MCHC RBC AUTO-ENTMCNC: 29.6 G/DL (ref 30–36.5)
MCHC RBC AUTO-ENTMCNC: 29.7 G/DL (ref 30–36.5)
MCHC RBC AUTO-ENTMCNC: 30.2 G/DL (ref 30–36.5)
MCHC RBC AUTO-ENTMCNC: 30.3 G/DL (ref 30–36.5)
MCHC RBC AUTO-ENTMCNC: 30.3 G/DL (ref 30–36.5)
MCHC RBC AUTO-ENTMCNC: 30.5 G/DL (ref 30–36.5)
MCHC RBC AUTO-ENTMCNC: 30.7 G/DL (ref 30–36.5)
MCHC RBC AUTO-ENTMCNC: 30.8 G/DL (ref 30–36.5)
MCHC RBC AUTO-ENTMCNC: 30.9 G/DL (ref 30–36.5)
MCHC RBC AUTO-ENTMCNC: 31.2 G/DL (ref 30–36.5)
MCHC RBC AUTO-ENTMCNC: 31.3 G/DL (ref 30–36.5)
MCHC RBC AUTO-ENTMCNC: 31.4 G/DL (ref 30–36.5)
MCHC RBC AUTO-ENTMCNC: 31.5 G/DL (ref 30–36.5)
MCHC RBC AUTO-ENTMCNC: 31.5 G/DL (ref 30–36.5)
MCHC RBC AUTO-ENTMCNC: 31.7 G/DL (ref 30–36.5)
MCHC RBC AUTO-ENTMCNC: 31.7 G/DL (ref 30–36.5)
MCHC RBC AUTO-ENTMCNC: 31.8 G/DL (ref 30–36.5)
MCHC RBC AUTO-ENTMCNC: 31.8 G/DL (ref 30–36.5)
MCHC RBC AUTO-ENTMCNC: 32 G/DL (ref 30–36.5)
MCHC RBC AUTO-ENTMCNC: 32.1 G/DL (ref 30–36.5)
MCHC RBC AUTO-ENTMCNC: 32.2 G/DL (ref 30–36.5)
MCHC RBC AUTO-ENTMCNC: 32.3 G/DL (ref 30–36.5)
MCHC RBC AUTO-ENTMCNC: 32.3 G/DL (ref 30–36.5)
MCHC RBC AUTO-ENTMCNC: 32.5 G/DL (ref 30–36.5)
MCHC RBC AUTO-ENTMCNC: 32.6 G/DL (ref 30–36.5)
MCHC RBC AUTO-ENTMCNC: 32.8 G/DL (ref 30–36.5)
MCHC RBC AUTO-ENTMCNC: 33 G/DL (ref 30–36.5)
MCHC RBC AUTO-ENTMCNC: 33.1 G/DL (ref 30–36.5)
MCHC RBC AUTO-ENTMCNC: 33.3 G/DL (ref 30–36.5)
MCV RBC AUTO: 100 FL (ref 80–99)
MCV RBC AUTO: 100.4 FL (ref 80–99)
MCV RBC AUTO: 100.8 FL (ref 80–99)
MCV RBC AUTO: 101.6 FL (ref 80–99)
MCV RBC AUTO: 101.6 FL (ref 80–99)
MCV RBC AUTO: 101.9 FL (ref 80–99)
MCV RBC AUTO: 102.3 FL (ref 80–99)
MCV RBC AUTO: 102.8 FL (ref 80–99)
MCV RBC AUTO: 103.5 FL (ref 80–99)
MCV RBC AUTO: 103.8 FL (ref 80–99)
MCV RBC AUTO: 105.4 FL (ref 80–99)
MCV RBC AUTO: 76.3 FL (ref 80–99)
MCV RBC AUTO: 81.8 FL (ref 80–99)
MCV RBC AUTO: 83.2 FL (ref 80–99)
MCV RBC AUTO: 83.3 FL (ref 80–99)
MCV RBC AUTO: 83.5 FL (ref 80–99)
MCV RBC AUTO: 83.6 FL (ref 80–99)
MCV RBC AUTO: 84 FL (ref 80–99)
MCV RBC AUTO: 84.3 FL (ref 80–99)
MCV RBC AUTO: 84.3 FL (ref 80–99)
MCV RBC AUTO: 84.7 FL (ref 80–99)
MCV RBC AUTO: 84.8 FL (ref 80–99)
MCV RBC AUTO: 84.8 FL (ref 80–99)
MCV RBC AUTO: 84.9 FL (ref 80–99)
MCV RBC AUTO: 85.1 FL (ref 80–99)
MCV RBC AUTO: 85.3 FL (ref 80–99)
MCV RBC AUTO: 85.4 FL (ref 80–99)
MCV RBC AUTO: 85.5 FL (ref 80–99)
MCV RBC AUTO: 85.7 FL (ref 80–99)
MCV RBC AUTO: 86 FL (ref 80–99)
MCV RBC AUTO: 86 FL (ref 80–99)
MCV RBC AUTO: 86.2 FL (ref 80–99)
MCV RBC AUTO: 86.3 FL (ref 80–99)
MCV RBC AUTO: 86.3 FL (ref 80–99)
MCV RBC AUTO: 86.4 FL (ref 80–99)
MCV RBC AUTO: 86.7 FL (ref 80–99)
MCV RBC AUTO: 86.7 FL (ref 80–99)
MCV RBC AUTO: 86.8 FL (ref 80–99)
MCV RBC AUTO: 86.8 FL (ref 80–99)
MCV RBC AUTO: 87 FL (ref 80–99)
MCV RBC AUTO: 87.5 FL (ref 80–99)
MCV RBC AUTO: 87.6 FL (ref 80–99)
MCV RBC AUTO: 87.9 FL (ref 80–99)
MCV RBC AUTO: 88.4 FL (ref 80–99)
MCV RBC AUTO: 88.4 FL (ref 80–99)
MCV RBC AUTO: 91.6 FL (ref 80–99)
MCV RBC AUTO: 93.5 FL (ref 80–99)
MCV RBC AUTO: 94.1 FL (ref 80–99)
MCV RBC AUTO: 96 FL (ref 80–99)
MCV RBC AUTO: 96.9 FL (ref 80–99)
MCV RBC AUTO: 98.3 FL (ref 80–99)
MCV RBC AUTO: 98.5 FL (ref 80–99)
MCV RBC AUTO: 98.7 FL (ref 80–99)
MCV RBC AUTO: 98.8 FL (ref 80–99)
METHADONE UR QL: NEGATIVE
METHGB MFR BLD: 0 % (ref 0–1.4)
METHGB MFR BLD: 0.2 % (ref 0–1.4)
METHGB MFR BLD: 0.2 % (ref 0–1.4)
METHGB MFR BLD: 0.3 % (ref 0–1.4)
METHGB MFR BLD: 0.3 % (ref 0–1.4)
METHGB MFR BLD: 0.4 % (ref 0–1.4)
METHGB MFR BLD: 0.5 % (ref 0–1.4)
METHGB MFR BLD: 0.5 % (ref 0–1.4)
MICROALBUMIN UR-MCNC: 8.32 MG/DL
MICROALBUMIN/CREAT UR-RTO: 273 MG/G (ref 0–30)
MONOCYTES # BLD: 0.1 K/UL (ref 0–1)
MONOCYTES # BLD: 0.3 K/UL (ref 0–1)
MONOCYTES # BLD: 0.3 K/UL (ref 0–1)
MONOCYTES # BLD: 0.4 K/UL (ref 0–1)
MONOCYTES # BLD: 0.5 K/UL (ref 0–1)
MONOCYTES # BLD: 0.6 K/UL (ref 0–1)
MONOCYTES # BLD: 0.7 K/UL (ref 0–1)
MONOCYTES # BLD: 0.8 K/UL (ref 0–1)
MONOCYTES # BLD: 0.9 K/UL (ref 0–1)
MONOCYTES # BLD: 1 K/UL (ref 0–1)
MONOCYTES # BLD: 1.1 K/UL (ref 0–1)
MONOCYTES # BLD: 1.2 K/UL (ref 0–1)
MONOCYTES # BLD: 1.2 K/UL (ref 0–1)
MONOCYTES # BLD: 1.3 K/UL (ref 0–1)
MONOCYTES # BLD: 1.5 K/UL (ref 0–1)
MONOCYTES # BLD: 1.6 K/UL (ref 0–1)
MONOCYTES # BLD: 1.6 K/UL (ref 0–1)
MONOCYTES NFR BLD: 10 % (ref 5–13)
MONOCYTES NFR BLD: 11 % (ref 5–13)
MONOCYTES NFR BLD: 12 % (ref 5–13)
MONOCYTES NFR BLD: 13 % (ref 5–13)
MONOCYTES NFR BLD: 14 % (ref 5–13)
MONOCYTES NFR BLD: 15 % (ref 5–13)
MONOCYTES NFR BLD: 15 % (ref 5–13)
MONOCYTES NFR BLD: 16 % (ref 5–13)
MONOCYTES NFR BLD: 17 % (ref 5–13)
MONOCYTES NFR BLD: 4 % (ref 5–13)
MONOCYTES NFR BLD: 5 % (ref 5–13)
MONOCYTES NFR BLD: 5 % (ref 5–13)
MONOCYTES NFR BLD: 6 % (ref 5–13)
MONOCYTES NFR BLD: 7 % (ref 5–13)
MONOCYTES NFR BLD: 8 % (ref 5–13)
MONOCYTES NFR BLD: 9 % (ref 5–13)
MYELOCYTES NFR BLD MANUAL: 2 %
NEUTS SEG # BLD: 10 K/UL (ref 1.8–8)
NEUTS SEG # BLD: 17.6 K/UL (ref 1.8–8)
NEUTS SEG # BLD: 2.5 K/UL (ref 1.8–8)
NEUTS SEG # BLD: 2.7 K/UL (ref 1.8–8)
NEUTS SEG # BLD: 3 K/UL (ref 1.8–8)
NEUTS SEG # BLD: 3.2 K/UL (ref 1.8–8)
NEUTS SEG # BLD: 3.5 K/UL (ref 1.8–8)
NEUTS SEG # BLD: 3.7 K/UL (ref 1.8–8)
NEUTS SEG # BLD: 3.8 K/UL (ref 1.8–8)
NEUTS SEG # BLD: 3.8 K/UL (ref 1.8–8)
NEUTS SEG # BLD: 4 K/UL (ref 1.8–8)
NEUTS SEG # BLD: 4 K/UL (ref 1.8–8)
NEUTS SEG # BLD: 4.1 K/UL (ref 1.8–8)
NEUTS SEG # BLD: 4.5 K/UL (ref 1.8–8)
NEUTS SEG # BLD: 4.5 K/UL (ref 1.8–8)
NEUTS SEG # BLD: 4.6 K/UL (ref 1.8–8)
NEUTS SEG # BLD: 4.8 K/UL (ref 1.8–8)
NEUTS SEG # BLD: 4.9 K/UL (ref 1.8–8)
NEUTS SEG # BLD: 5 K/UL (ref 1.8–8)
NEUTS SEG # BLD: 5.1 K/UL (ref 1.8–8)
NEUTS SEG # BLD: 5.2 K/UL (ref 1.8–8)
NEUTS SEG # BLD: 5.2 K/UL (ref 1.8–8)
NEUTS SEG # BLD: 5.4 K/UL (ref 1.8–8)
NEUTS SEG # BLD: 5.4 K/UL (ref 1.8–8)
NEUTS SEG # BLD: 5.5 K/UL (ref 1.8–8)
NEUTS SEG # BLD: 5.7 K/UL (ref 1.8–8)
NEUTS SEG # BLD: 5.8 K/UL (ref 1.8–8)
NEUTS SEG # BLD: 5.8 K/UL (ref 1.8–8)
NEUTS SEG # BLD: 5.9 K/UL (ref 1.8–8)
NEUTS SEG # BLD: 5.9 K/UL (ref 1.8–8)
NEUTS SEG # BLD: 6.1 K/UL (ref 1.8–8)
NEUTS SEG # BLD: 6.2 K/UL (ref 1.8–8)
NEUTS SEG # BLD: 6.2 K/UL (ref 1.8–8)
NEUTS SEG # BLD: 6.3 K/UL (ref 1.8–8)
NEUTS SEG # BLD: 6.4 K/UL (ref 1.8–8)
NEUTS SEG # BLD: 6.5 K/UL (ref 1.8–8)
NEUTS SEG # BLD: 6.6 K/UL (ref 1.8–8)
NEUTS SEG # BLD: 6.9 K/UL (ref 1.8–8)
NEUTS SEG # BLD: 7.1 K/UL (ref 1.8–8)
NEUTS SEG # BLD: 7.2 K/UL (ref 1.8–8)
NEUTS SEG # BLD: 7.6 K/UL (ref 1.8–8)
NEUTS SEG # BLD: 7.6 K/UL (ref 1.8–8)
NEUTS SEG # BLD: 8.3 K/UL (ref 1.8–8)
NEUTS SEG # BLD: 9.1 K/UL (ref 1.8–8)
NEUTS SEG NFR BLD: 52 % (ref 32–75)
NEUTS SEG NFR BLD: 53 % (ref 32–75)
NEUTS SEG NFR BLD: 57 % (ref 32–75)
NEUTS SEG NFR BLD: 63 % (ref 32–75)
NEUTS SEG NFR BLD: 64 % (ref 32–75)
NEUTS SEG NFR BLD: 65 % (ref 32–75)
NEUTS SEG NFR BLD: 66 % (ref 32–75)
NEUTS SEG NFR BLD: 66 % (ref 32–75)
NEUTS SEG NFR BLD: 67 % (ref 32–75)
NEUTS SEG NFR BLD: 68 % (ref 32–75)
NEUTS SEG NFR BLD: 68 % (ref 32–75)
NEUTS SEG NFR BLD: 69 % (ref 32–75)
NEUTS SEG NFR BLD: 69 % (ref 32–75)
NEUTS SEG NFR BLD: 70 % (ref 32–75)
NEUTS SEG NFR BLD: 70 % (ref 32–75)
NEUTS SEG NFR BLD: 71 % (ref 32–75)
NEUTS SEG NFR BLD: 72 % (ref 32–75)
NEUTS SEG NFR BLD: 73 % (ref 32–75)
NEUTS SEG NFR BLD: 74 % (ref 32–75)
NEUTS SEG NFR BLD: 75 % (ref 32–75)
NEUTS SEG NFR BLD: 76 % (ref 32–75)
NEUTS SEG NFR BLD: 76 % (ref 32–75)
NEUTS SEG NFR BLD: 77 % (ref 32–75)
NEUTS SEG NFR BLD: 77 % (ref 32–75)
NEUTS SEG NFR BLD: 78 % (ref 32–75)
NEUTS SEG NFR BLD: 79 % (ref 32–75)
NEUTS SEG NFR BLD: 79 % (ref 32–75)
NEUTS SEG NFR BLD: 80 % (ref 32–75)
NEUTS SEG NFR BLD: 81 % (ref 32–75)
NEUTS SEG NFR BLD: 84 % (ref 32–75)
NEUTS SEG NFR BLD: 86 % (ref 32–75)
NITRITE UR QL STRIP.AUTO: NEGATIVE
NITRITE UR QL STRIP.AUTO: POSITIVE
NITRITE UR QL STRIP.AUTO: POSITIVE
NRBC # BLD: 0 K/UL (ref 0–0.01)
NRBC # BLD: 0.02 K/UL (ref 0–0.01)
NRBC # BLD: 0.03 K/UL (ref 0–0.01)
NRBC # BLD: 0.04 K/UL (ref 0–0.01)
NRBC # BLD: 0.05 K/UL (ref 0–0.01)
NRBC # BLD: 0.07 K/UL (ref 0–0.01)
NRBC # BLD: 0.09 K/UL (ref 0–0.01)
NRBC # BLD: 0.11 K/UL (ref 0–0.01)
NRBC BLD-RTO: 0 PER 100 WBC
NRBC BLD-RTO: 0.2 PER 100 WBC
NRBC BLD-RTO: 0.3 PER 100 WBC
NRBC BLD-RTO: 0.4 PER 100 WBC
NRBC BLD-RTO: 0.6 PER 100 WBC
NRBC BLD-RTO: 1 PER 100 WBC
NRBC BLD-RTO: 1 PER 100 WBC
NRBC BLD-RTO: 1.2 PER 100 WBC
O2/TOTAL GAS SETTING VFR VENT: 50 %
O2/TOTAL GAS SETTING VFR VENT: 50 %
O2/TOTAL GAS SETTING VFR VENT: 60 %
O2/TOTAL GAS SETTING VFR VENT: 60 %
OPIATES UR QL: NEGATIVE
OXYHGB MFR BLD: 39.7 % (ref 94–97)
OXYHGB MFR BLD: 54.2 % (ref 94–97)
OXYHGB MFR BLD: 55 % (ref 94–97)
OXYHGB MFR BLD: 56.9 % (ref 94–97)
OXYHGB MFR BLD: 63.5 % (ref 94–97)
OXYHGB MFR BLD: 67.2 % (ref 94–97)
OXYHGB MFR BLD: 67.2 % (ref 94–97)
OXYHGB MFR BLD: 70.3 % (ref 94–97)
P-R INTERVAL, ECG05: 116 MS
P-R INTERVAL, ECG05: 118 MS
P-R INTERVAL, ECG05: 120 MS
P-R INTERVAL, ECG05: 128 MS
P-R INTERVAL, ECG05: 140 MS
P-R INTERVAL, ECG05: 152 MS
P-R INTERVAL, ECG05: 64 MS
PCO2 BLD: 24 MMHG (ref 35–45)
PCO2 BLD: 25.4 MMHG (ref 35–45)
PCO2 BLD: 25.5 MMHG (ref 35–45)
PCO2 BLD: 25.7 MMHG (ref 35–45)
PCO2 BLD: 25.8 MMHG (ref 35–45)
PCO2 BLD: 26.8 MMHG (ref 35–45)
PCO2 BLD: 29.1 MMHG (ref 35–45)
PCO2 BLD: 29.9 MMHG (ref 35–45)
PCO2 BLD: 29.9 MMHG (ref 35–45)
PCO2 BLD: 31 MMHG (ref 35–45)
PCO2 BLD: 32.7 MMHG (ref 35–45)
PCO2 BLD: 33.3 MMHG (ref 35–45)
PCO2 BLD: 35.1 MMHG (ref 35–45)
PCO2 BLD: 35.1 MMHG (ref 35–45)
PCO2 BLD: 36 MMHG (ref 35–45)
PCO2 BLD: 36.3 MMHG (ref 35–45)
PCO2 BLD: 37.1 MMHG (ref 35–45)
PCO2 BLD: 37.2 MMHG (ref 35–45)
PCO2 BLD: 38.6 MMHG (ref 35–45)
PCO2 BLD: 39.6 MMHG (ref 35–45)
PCO2 BLD: 43.2 MMHG (ref 35–45)
PCO2 BLD: 44.4 MMHG (ref 35–45)
PCO2 BLD: 48.9 MMHG (ref 35–45)
PCO2 BLDV: 32.6 MMHG (ref 41–51)
PCO2 BLDV: 34.4 MMHG (ref 41–51)
PCO2 BLDV: 35 MMHG (ref 41–51)
PCO2 BLDV: 36 MMHG (ref 41–51)
PCP UR QL: NEGATIVE
PEEP RESPIRATORY: 5
PEEP RESPIRATORY: 5 CMH2O
PF4 HEPARIN CMPLX AB SER-ACNC: 0.09 OD (ref 0–0.4)
PH BLD: 7.26 (ref 7.35–7.45)
PH BLD: 7.27 (ref 7.35–7.45)
PH BLD: 7.32 (ref 7.35–7.45)
PH BLD: 7.33 (ref 7.35–7.45)
PH BLD: 7.35 (ref 7.35–7.45)
PH BLD: 7.36 (ref 7.35–7.45)
PH BLD: 7.38 (ref 7.35–7.45)
PH BLD: 7.39 (ref 7.35–7.45)
PH BLD: 7.4 (ref 7.35–7.45)
PH BLD: 7.4 (ref 7.35–7.45)
PH BLD: 7.41 (ref 7.35–7.45)
PH BLD: 7.41 (ref 7.35–7.45)
PH BLD: 7.42 (ref 7.35–7.45)
PH BLD: 7.43 (ref 7.35–7.45)
PH BLD: 7.43 (ref 7.35–7.45)
PH BLD: 7.44 (ref 7.35–7.45)
PH BLD: 7.45 (ref 7.35–7.45)
PH BLD: 7.47 (ref 7.35–7.45)
PH BLD: 7.61 (ref 7.35–7.45)
PH BLDV: 7.31 (ref 7.32–7.42)
PH BLDV: 7.35 (ref 7.32–7.42)
PH BLDV: 7.36 (ref 7.32–7.42)
PH BLDV: 7.38 (ref 7.32–7.42)
PH UR STRIP: 5 (ref 5–8)
PH UR STRIP: 5.5 (ref 5–8)
PH UR STRIP: 5.5 (ref 5–8)
PH UR STRIP: 6 (ref 5–8)
PH UR STRIP: 7.5 (ref 5–8)
PHOSPHATE SERPL-MCNC: 1.5 MG/DL (ref 2.6–4.7)
PHOSPHATE SERPL-MCNC: 1.9 MG/DL (ref 2.6–4.7)
PHOSPHATE SERPL-MCNC: 2 MG/DL (ref 2.6–4.7)
PHOSPHATE SERPL-MCNC: 2 MG/DL (ref 2.6–4.7)
PHOSPHATE SERPL-MCNC: 2.1 MG/DL (ref 2.6–4.7)
PHOSPHATE SERPL-MCNC: 2.1 MG/DL (ref 2.6–4.7)
PHOSPHATE SERPL-MCNC: 2.2 MG/DL (ref 2.6–4.7)
PHOSPHATE SERPL-MCNC: 2.3 MG/DL (ref 2.6–4.7)
PHOSPHATE SERPL-MCNC: 2.3 MG/DL (ref 2.6–4.7)
PHOSPHATE SERPL-MCNC: 2.4 MG/DL (ref 2.6–4.7)
PHOSPHATE SERPL-MCNC: 2.5 MG/DL (ref 2.6–4.7)
PHOSPHATE SERPL-MCNC: 2.6 MG/DL (ref 2.6–4.7)
PHOSPHATE SERPL-MCNC: 2.7 MG/DL (ref 2.6–4.7)
PHOSPHATE SERPL-MCNC: 2.8 MG/DL (ref 2.6–4.7)
PHOSPHATE SERPL-MCNC: 2.9 MG/DL (ref 2.6–4.7)
PHOSPHATE SERPL-MCNC: 3 MG/DL (ref 2.6–4.7)
PHOSPHATE SERPL-MCNC: 3.1 MG/DL (ref 2.6–4.7)
PHOSPHATE SERPL-MCNC: 3.1 MG/DL (ref 2.6–4.7)
PHOSPHATE SERPL-MCNC: 3.2 MG/DL (ref 2.6–4.7)
PHOSPHATE SERPL-MCNC: 3.3 MG/DL (ref 2.6–4.7)
PHOSPHATE SERPL-MCNC: 3.4 MG/DL (ref 2.6–4.7)
PHOSPHATE SERPL-MCNC: 3.5 MG/DL (ref 2.6–4.7)
PHOSPHATE SERPL-MCNC: 3.5 MG/DL (ref 2.6–4.7)
PHOSPHATE SERPL-MCNC: 3.6 MG/DL (ref 2.6–4.7)
PHOSPHATE SERPL-MCNC: 3.7 MG/DL (ref 2.6–4.7)
PHOSPHATE SERPL-MCNC: 3.8 MG/DL (ref 2.6–4.7)
PHOSPHATE SERPL-MCNC: 3.9 MG/DL (ref 2.6–4.7)
PHOSPHATE SERPL-MCNC: 3.9 MG/DL (ref 2.6–4.7)
PHOSPHATE SERPL-MCNC: 4 MG/DL (ref 2.6–4.7)
PHOSPHATE SERPL-MCNC: 4 MG/DL (ref 2.6–4.7)
PHOSPHATE SERPL-MCNC: 4.2 MG/DL (ref 2.6–4.7)
PHOSPHATE SERPL-MCNC: 4.2 MG/DL (ref 2.6–4.7)
PHOSPHATE SERPL-MCNC: 4.4 MG/DL (ref 2.6–4.7)
PHOSPHATE SERPL-MCNC: 4.6 MG/DL (ref 2.6–4.7)
PHOSPHATE SERPL-MCNC: 4.7 MG/DL (ref 2.6–4.7)
PHOSPHATE SERPL-MCNC: 4.7 MG/DL (ref 2.6–4.7)
PHOSPHATE SERPL-MCNC: 5.3 MG/DL (ref 2.6–4.7)
PHOSPHATE SERPL-MCNC: 5.5 MG/DL (ref 2.6–4.7)
PHOSPHATE SERPL-MCNC: 6 MG/DL (ref 2.6–4.7)
PHOSPHATE SERPL-MCNC: 6.3 MG/DL (ref 2.6–4.7)
PHOSPHATE SERPL-MCNC: 7.1 MG/DL (ref 2.6–4.7)
PHOSPHATE SERPL-MCNC: 7.2 MG/DL (ref 2.6–4.7)
PLATELET # BLD AUTO: 101 K/UL (ref 150–400)
PLATELET # BLD AUTO: 109 K/UL (ref 150–400)
PLATELET # BLD AUTO: 112 K/UL (ref 150–400)
PLATELET # BLD AUTO: 134 K/UL (ref 150–400)
PLATELET # BLD AUTO: 143 K/UL (ref 150–400)
PLATELET # BLD AUTO: 152 K/UL (ref 150–400)
PLATELET # BLD AUTO: 164 K/UL (ref 150–400)
PLATELET # BLD AUTO: 166 K/UL (ref 150–400)
PLATELET # BLD AUTO: 171 K/UL (ref 150–400)
PLATELET # BLD AUTO: 177 K/UL (ref 150–400)
PLATELET # BLD AUTO: 177 K/UL (ref 150–400)
PLATELET # BLD AUTO: 185 K/UL (ref 150–400)
PLATELET # BLD AUTO: 190 K/UL (ref 150–400)
PLATELET # BLD AUTO: 192 K/UL (ref 150–400)
PLATELET # BLD AUTO: 193 K/UL (ref 150–400)
PLATELET # BLD AUTO: 198 K/UL (ref 150–400)
PLATELET # BLD AUTO: 198 K/UL (ref 150–400)
PLATELET # BLD AUTO: 201 K/UL (ref 150–400)
PLATELET # BLD AUTO: 201 K/UL (ref 150–400)
PLATELET # BLD AUTO: 202 K/UL (ref 150–400)
PLATELET # BLD AUTO: 205 K/UL (ref 150–400)
PLATELET # BLD AUTO: 206 K/UL (ref 150–400)
PLATELET # BLD AUTO: 211 K/UL (ref 150–400)
PLATELET # BLD AUTO: 217 K/UL (ref 150–400)
PLATELET # BLD AUTO: 217 K/UL (ref 150–400)
PLATELET # BLD AUTO: 221 K/UL (ref 150–400)
PLATELET # BLD AUTO: 222 K/UL (ref 150–400)
PLATELET # BLD AUTO: 229 K/UL (ref 150–400)
PLATELET # BLD AUTO: 230 K/UL (ref 150–400)
PLATELET # BLD AUTO: 230 K/UL (ref 150–400)
PLATELET # BLD AUTO: 233 K/UL (ref 150–400)
PLATELET # BLD AUTO: 234 K/UL (ref 150–400)
PLATELET # BLD AUTO: 238 K/UL (ref 150–400)
PLATELET # BLD AUTO: 239 K/UL (ref 150–400)
PLATELET # BLD AUTO: 243 K/UL (ref 150–400)
PLATELET # BLD AUTO: 244 K/UL (ref 150–400)
PLATELET # BLD AUTO: 247 K/UL (ref 150–400)
PLATELET # BLD AUTO: 252 K/UL (ref 150–400)
PLATELET # BLD AUTO: 253 K/UL (ref 150–400)
PLATELET # BLD AUTO: 254 K/UL (ref 150–400)
PLATELET # BLD AUTO: 255 K/UL (ref 150–400)
PLATELET # BLD AUTO: 257 K/UL (ref 150–400)
PLATELET # BLD AUTO: 259 K/UL (ref 150–400)
PLATELET # BLD AUTO: 260 K/UL (ref 150–400)
PLATELET # BLD AUTO: 261 K/UL (ref 150–400)
PLATELET # BLD AUTO: 270 K/UL (ref 150–400)
PLATELET # BLD AUTO: 271 K/UL (ref 150–400)
PLATELET # BLD AUTO: 271 K/UL (ref 150–400)
PLATELET # BLD AUTO: 276 K/UL (ref 150–400)
PLATELET # BLD AUTO: 297 K/UL (ref 150–400)
PLATELET # BLD AUTO: 304 K/UL (ref 150–400)
PLATELET # BLD AUTO: 307 K/UL (ref 150–400)
PLATELET # BLD AUTO: 337 K/UL (ref 150–400)
PLATELET # BLD AUTO: 34 K/UL (ref 150–400)
PLATELET # BLD AUTO: 356 K/UL (ref 150–400)
PLATELET # BLD AUTO: 358 K/UL (ref 150–400)
PLATELET # BLD AUTO: 49 K/UL (ref 150–400)
PLATELET # BLD AUTO: 54 K/UL (ref 150–400)
PLATELET # BLD AUTO: 60 K/UL (ref 150–400)
PLATELET # BLD AUTO: 70 K/UL (ref 150–400)
PLATELET # BLD AUTO: 80 K/UL (ref 150–400)
PLATELET # BLD AUTO: 93 K/UL (ref 150–400)
PLATELET COMMENTS,PCOM: ABNORMAL
PMV BLD AUTO: 10.3 FL (ref 8.9–12.9)
PMV BLD AUTO: 10.4 FL (ref 8.9–12.9)
PMV BLD AUTO: 10.5 FL (ref 8.9–12.9)
PMV BLD AUTO: 10.6 FL (ref 8.9–12.9)
PMV BLD AUTO: 10.7 FL (ref 8.9–12.9)
PMV BLD AUTO: 10.8 FL (ref 8.9–12.9)
PMV BLD AUTO: 10.9 FL (ref 8.9–12.9)
PMV BLD AUTO: 11 FL (ref 8.9–12.9)
PMV BLD AUTO: 11 FL (ref 8.9–12.9)
PMV BLD AUTO: 11.2 FL (ref 8.9–12.9)
PMV BLD AUTO: 11.2 FL (ref 8.9–12.9)
PMV BLD AUTO: 11.6 FL (ref 8.9–12.9)
PMV BLD AUTO: 11.7 FL (ref 8.9–12.9)
PMV BLD AUTO: 11.8 FL (ref 8.9–12.9)
PMV BLD AUTO: 12 FL (ref 8.9–12.9)
PMV BLD AUTO: 12.1 FL (ref 8.9–12.9)
PMV BLD AUTO: 12.2 FL (ref 8.9–12.9)
PMV BLD AUTO: 12.2 FL (ref 8.9–12.9)
PMV BLD AUTO: 12.3 FL (ref 8.9–12.9)
PMV BLD AUTO: 12.5 FL (ref 8.9–12.9)
PMV BLD AUTO: 12.6 FL (ref 8.9–12.9)
PMV BLD AUTO: 12.7 FL (ref 8.9–12.9)
PMV BLD AUTO: 12.9 FL (ref 8.9–12.9)
PMV BLD AUTO: 13 FL (ref 8.9–12.9)
PO2 BLD: 107 MMHG (ref 80–100)
PO2 BLD: 108 MMHG (ref 80–100)
PO2 BLD: 110 MMHG (ref 80–100)
PO2 BLD: 141 MMHG (ref 80–100)
PO2 BLD: 142 MMHG (ref 80–100)
PO2 BLD: 148 MMHG (ref 80–100)
PO2 BLD: 150 MMHG (ref 80–100)
PO2 BLD: 173 MMHG (ref 80–100)
PO2 BLD: 175 MMHG (ref 80–100)
PO2 BLD: 176 MMHG (ref 80–100)
PO2 BLD: 195 MMHG (ref 80–100)
PO2 BLD: 253 MMHG (ref 80–100)
PO2 BLD: 254 MMHG (ref 80–100)
PO2 BLD: 277 MMHG (ref 80–100)
PO2 BLD: 295 MMHG (ref 80–100)
PO2 BLD: 333 MMHG (ref 80–100)
PO2 BLD: 390 MMHG (ref 80–100)
PO2 BLD: 62 MMHG (ref 80–100)
PO2 BLD: 69 MMHG (ref 80–100)
PO2 BLD: 72 MMHG (ref 80–100)
PO2 BLD: 78 MMHG (ref 80–100)
PO2 BLD: 81 MMHG (ref 80–100)
PO2 BLD: 99 MMHG (ref 80–100)
PO2 BLDV: 31 MMHG (ref 25–40)
PO2 BLDV: 32 MMHG (ref 25–40)
PO2 BLDV: 32 MMHG (ref 25–40)
PO2 BLDV: 34 MMHG (ref 25–40)
POTASSIUM BLD-SCNC: 4.2 MMOL/L (ref 3.5–5.5)
POTASSIUM SERPL-SCNC: 3.1 MMOL/L (ref 3.5–5.1)
POTASSIUM SERPL-SCNC: 3.2 MMOL/L (ref 3.5–5.1)
POTASSIUM SERPL-SCNC: 3.3 MMOL/L (ref 3.5–5.1)
POTASSIUM SERPL-SCNC: 3.4 MMOL/L (ref 3.5–5.1)
POTASSIUM SERPL-SCNC: 3.4 MMOL/L (ref 3.5–5.1)
POTASSIUM SERPL-SCNC: 3.5 MMOL/L (ref 3.5–5.1)
POTASSIUM SERPL-SCNC: 3.6 MMOL/L (ref 3.5–5.1)
POTASSIUM SERPL-SCNC: 3.6 MMOL/L (ref 3.5–5.1)
POTASSIUM SERPL-SCNC: 3.8 MMOL/L (ref 3.5–5.1)
POTASSIUM SERPL-SCNC: 3.9 MMOL/L (ref 3.5–5.1)
POTASSIUM SERPL-SCNC: 4 MMOL/L (ref 3.5–5.1)
POTASSIUM SERPL-SCNC: 4.1 MMOL/L (ref 3.5–5.1)
POTASSIUM SERPL-SCNC: 4.2 MMOL/L (ref 3.5–5.1)
POTASSIUM SERPL-SCNC: 4.3 MMOL/L (ref 3.5–5.1)
POTASSIUM SERPL-SCNC: 4.4 MMOL/L (ref 3.5–5.1)
POTASSIUM SERPL-SCNC: 4.5 MMOL/L (ref 3.5–5.1)
POTASSIUM SERPL-SCNC: 4.6 MMOL/L (ref 3.5–5.1)
POTASSIUM SERPL-SCNC: 4.7 MMOL/L (ref 3.5–5.1)
POTASSIUM SERPL-SCNC: 4.8 MMOL/L (ref 3.5–5.1)
POTASSIUM SERPL-SCNC: 4.9 MMOL/L (ref 3.5–5.1)
POTASSIUM SERPL-SCNC: 4.9 MMOL/L (ref 3.5–5.1)
POTASSIUM SERPL-SCNC: 5 MMOL/L (ref 3.5–5.1)
POTASSIUM SERPL-SCNC: 5.4 MMOL/L (ref 3.5–5.1)
PREALB SERPL-MCNC: 10.2 MG/DL (ref 20–40)
PREALB SERPL-MCNC: 11.6 MG/DL (ref 20–40)
PREALB SERPL-MCNC: 13.1 MG/DL (ref 20–40)
PREALB SERPL-MCNC: 13.5 MG/DL (ref 20–40)
PREALB SERPL-MCNC: 13.6 MG/DL (ref 20–40)
PREALB SERPL-MCNC: 15.5 MG/DL (ref 20–40)
PREALB SERPL-MCNC: 5.7 MG/DL (ref 20–40)
PREALB SERPL-MCNC: 9.3 MG/DL (ref 20–40)
PREALB SERPL-MCNC: 9.9 MG/DL (ref 20–40)
PRESSURE SUPPORT SETTING VENT: 5
PROCALCITONIN SERPL-MCNC: 0.15 NG/ML
PROCALCITONIN SERPL-MCNC: 0.42 NG/ML
PROCALCITONIN SERPL-MCNC: 1.82 NG/ML
PROCALCITONIN SERPL-MCNC: 2 NG/ML
PROCALCITONIN SERPL-MCNC: 2.07 NG/ML
PROCALCITONIN SERPL-MCNC: 2.07 NG/ML
PROCALCITONIN SERPL-MCNC: 2.14 NG/ML
PROCALCITONIN SERPL-MCNC: 2.18 NG/ML
PROCALCITONIN SERPL-MCNC: 2.33 NG/ML
PROCALCITONIN SERPL-MCNC: 2.37 NG/ML
PROCALCITONIN SERPL-MCNC: 2.44 NG/ML
PROCALCITONIN SERPL-MCNC: 2.58 NG/ML
PROCALCITONIN SERPL-MCNC: 2.61 NG/ML
PROCALCITONIN SERPL-MCNC: 2.66 NG/ML
PROCALCITONIN SERPL-MCNC: 2.85 NG/ML
PROCALCITONIN SERPL-MCNC: 3.14 NG/ML
PROCALCITONIN SERPL-MCNC: 3.33 NG/ML
PROCALCITONIN SERPL-MCNC: 3.55 NG/ML
PROCALCITONIN SERPL-MCNC: 3.74 NG/ML
PROCALCITONIN SERPL-MCNC: 4.17 NG/ML
PROCALCITONIN SERPL-MCNC: 4.24 NG/ML
PROCALCITONIN SERPL-MCNC: 4.36 NG/ML
PROCALCITONIN SERPL-MCNC: 4.47 NG/ML
PROCALCITONIN SERPL-MCNC: 4.88 NG/ML
PROCALCITONIN SERPL-MCNC: 4.94 NG/ML
PROCALCITONIN SERPL-MCNC: 5.2 NG/ML
PROCALCITONIN SERPL-MCNC: 5.32 NG/ML
PROCALCITONIN SERPL-MCNC: 5.57 NG/ML
PROCALCITONIN SERPL-MCNC: 5.86 NG/ML
PROCALCITONIN SERPL-MCNC: 7.02 NG/ML
PROCALCITONIN SERPL-MCNC: 7.14 NG/ML
PROCALCITONIN SERPL-MCNC: 7.63 NG/ML
PROCALCITONIN SERPL-MCNC: 8.01 NG/ML
PROCALCITONIN SERPL-MCNC: <0.05 NG/ML
PROT SERPL-MCNC: 4.9 G/DL (ref 6.4–8.2)
PROT SERPL-MCNC: 5.1 G/DL (ref 6.4–8.2)
PROT SERPL-MCNC: 5.2 G/DL (ref 6.4–8.2)
PROT SERPL-MCNC: 5.3 G/DL (ref 6.4–8.2)
PROT SERPL-MCNC: 5.4 G/DL (ref 6.4–8.2)
PROT SERPL-MCNC: 5.5 G/DL (ref 6.4–8.2)
PROT SERPL-MCNC: 5.5 G/DL (ref 6.4–8.2)
PROT SERPL-MCNC: 5.6 G/DL (ref 6.4–8.2)
PROT SERPL-MCNC: 5.6 G/DL (ref 6.4–8.2)
PROT SERPL-MCNC: 5.7 G/DL (ref 6.4–8.2)
PROT SERPL-MCNC: 5.7 G/DL (ref 6.4–8.2)
PROT SERPL-MCNC: 5.8 G/DL (ref 6.4–8.2)
PROT SERPL-MCNC: 5.9 G/DL (ref 6.4–8.2)
PROT SERPL-MCNC: 5.9 G/DL (ref 6.4–8.2)
PROT SERPL-MCNC: 6 G/DL (ref 6.4–8.2)
PROT SERPL-MCNC: 6.1 G/DL (ref 6.4–8.2)
PROT SERPL-MCNC: 6.1 G/DL (ref 6.4–8.2)
PROT SERPL-MCNC: 6.2 G/DL (ref 6.4–8.2)
PROT SERPL-MCNC: 6.2 G/DL (ref 6.4–8.2)
PROT SERPL-MCNC: 6.3 G/DL (ref 6.4–8.2)
PROT SERPL-MCNC: 6.4 G/DL (ref 6.4–8.2)
PROT SERPL-MCNC: 6.5 G/DL (ref 6.4–8.2)
PROT SERPL-MCNC: 6.6 G/DL (ref 6.4–8.2)
PROT SERPL-MCNC: 6.7 G/DL (ref 6.4–8.2)
PROT SERPL-MCNC: 6.8 G/DL (ref 6.4–8.2)
PROT SERPL-MCNC: 6.9 G/DL (ref 6.4–8.2)
PROT SERPL-MCNC: 7 G/DL (ref 6.4–8.2)
PROT SERPL-MCNC: 7.2 G/DL (ref 6.4–8.2)
PROT SERPL-MCNC: 7.5 G/DL (ref 6.4–8.2)
PROT SERPL-MCNC: 7.8 G/DL (ref 6.4–8.2)
PROT UR STRIP-MCNC: 30 MG/DL
PROT UR STRIP-MCNC: 300 MG/DL
PROT UR STRIP-MCNC: >300 MG/DL
PROT UR STRIP-MCNC: ABNORMAL MG/DL
PROT UR STRIP-MCNC: NEGATIVE MG/DL
PROTHROMBIN TIME: 12.4 SEC (ref 9–11.1)
PROTHROMBIN TIME: 13 SEC (ref 9–11.1)
PROTHROMBIN TIME: 13.9 SEC (ref 9–11.1)
PROTHROMBIN TIME: 13.9 SEC (ref 9–11.1)
PROTHROMBIN TIME: 14 SEC (ref 9–11.1)
PROTHROMBIN TIME: 14.1 SEC (ref 9–11.1)
PROTHROMBIN TIME: 14.4 SEC (ref 9–11.1)
PROTHROMBIN TIME: 14.7 SEC (ref 9–11.1)
PROTHROMBIN TIME: 15.1 SEC (ref 9–11.1)
PROTHROMBIN TIME: 15.1 SEC (ref 9–11.1)
PROTHROMBIN TIME: 15.6 SEC (ref 9–11.1)
PROTHROMBIN TIME: 15.6 SEC (ref 9–11.1)
PROTHROMBIN TIME: 15.7 SEC (ref 9–11.1)
PROTHROMBIN TIME: 15.7 SEC (ref 9–11.1)
PROTHROMBIN TIME: 15.8 SEC (ref 9–11.1)
PROTHROMBIN TIME: 16.2 SEC (ref 9–11.1)
PROTHROMBIN TIME: 16.3 SEC (ref 9–11.1)
PROTHROMBIN TIME: 16.4 SEC (ref 9–11.1)
PROTHROMBIN TIME: 16.5 SEC (ref 9–11.1)
PROTHROMBIN TIME: 16.6 SEC (ref 9–11.1)
PROTHROMBIN TIME: 16.6 SEC (ref 9–11.1)
PROTHROMBIN TIME: 16.7 SEC (ref 9–11.1)
PROTHROMBIN TIME: 17 SEC (ref 9–11.1)
PROTHROMBIN TIME: 17.1 SEC (ref 9–11.1)
PROTHROMBIN TIME: 17.1 SEC (ref 9–11.1)
PROTHROMBIN TIME: 17.3 SEC (ref 9–11.1)
PROTHROMBIN TIME: 17.7 SEC (ref 9–11.1)
PROTHROMBIN TIME: 17.7 SEC (ref 9–11.1)
PROTHROMBIN TIME: 18.3 SEC (ref 9–11.1)
PROTHROMBIN TIME: 18.9 SEC (ref 9–11.1)
PROTHROMBIN TIME: 18.9 SEC (ref 9–11.1)
PROTHROMBIN TIME: 19 SEC (ref 9–11.1)
PROTHROMBIN TIME: 19.1 SEC (ref 9–11.1)
PROTHROMBIN TIME: 19.7 SEC (ref 9–11.1)
PROTHROMBIN TIME: 20.7 SEC (ref 9–11.1)
PROTHROMBIN TIME: 21.9 SEC (ref 9–11.1)
PROTHROMBIN TIME: 25.5 SEC (ref 9–11.1)
PROTHROMBIN TIME: 29.1 SEC (ref 9–11.1)
PROTHROMBIN TIME: 30.5 SEC (ref 9–11.1)
PROTHROMBIN TIME: 33 SEC (ref 9–11.1)
PROTHROMBIN TIME: 37 SEC (ref 9–11.1)
PTH-INTACT SERPL-MCNC: 84.5 PG/ML (ref 18.4–88)
Q-T INTERVAL, ECG07: 212 MS
Q-T INTERVAL, ECG07: 268 MS
Q-T INTERVAL, ECG07: 290 MS
Q-T INTERVAL, ECG07: 318 MS
Q-T INTERVAL, ECG07: 378 MS
Q-T INTERVAL, ECG07: 422 MS
Q-T INTERVAL, ECG07: 426 MS
Q-T INTERVAL, ECG07: 516 MS
Q-T INTERVAL, ECG07: 522 MS
Q-T INTERVAL, ECG07: 536 MS
QRS DURATION, ECG06: 180 MS
QRS DURATION, ECG06: 70 MS
QRS DURATION, ECG06: 72 MS
QRS DURATION, ECG06: 74 MS
QRS DURATION, ECG06: 76 MS
QRS DURATION, ECG06: 80 MS
QRS DURATION, ECG06: 80 MS
QTC CALCULATION (BEZET), ECG08: 368 MS
QTC CALCULATION (BEZET), ECG08: 401 MS
QTC CALCULATION (BEZET), ECG08: 406 MS
QTC CALCULATION (BEZET), ECG08: 413 MS
QTC CALCULATION (BEZET), ECG08: 475 MS
QTC CALCULATION (BEZET), ECG08: 588 MS
QTC CALCULATION (BEZET), ECG08: 599 MS
QTC CALCULATION (BEZET), ECG08: 607 MS
QTC CALCULATION (BEZET), ECG08: 652 MS
QTC CALCULATION (BEZET), ECG08: 655 MS
RBC # BLD AUTO: 2.14 M/UL (ref 4.1–5.7)
RBC # BLD AUTO: 2.26 M/UL (ref 4.1–5.7)
RBC # BLD AUTO: 2.31 M/UL (ref 4.1–5.7)
RBC # BLD AUTO: 2.32 M/UL (ref 4.1–5.7)
RBC # BLD AUTO: 2.35 M/UL (ref 4.1–5.7)
RBC # BLD AUTO: 2.37 M/UL (ref 4.1–5.7)
RBC # BLD AUTO: 2.39 M/UL (ref 4.1–5.7)
RBC # BLD AUTO: 2.5 M/UL (ref 4.1–5.7)
RBC # BLD AUTO: 2.52 M/UL (ref 4.1–5.7)
RBC # BLD AUTO: 2.53 M/UL (ref 4.1–5.7)
RBC # BLD AUTO: 2.54 M/UL (ref 4.1–5.7)
RBC # BLD AUTO: 2.54 M/UL (ref 4.1–5.7)
RBC # BLD AUTO: 2.55 M/UL (ref 4.1–5.7)
RBC # BLD AUTO: 2.57 M/UL (ref 4.1–5.7)
RBC # BLD AUTO: 2.58 M/UL (ref 4.1–5.7)
RBC # BLD AUTO: 2.58 M/UL (ref 4.1–5.7)
RBC # BLD AUTO: 2.59 M/UL (ref 4.1–5.7)
RBC # BLD AUTO: 2.6 M/UL (ref 4.1–5.7)
RBC # BLD AUTO: 2.6 M/UL (ref 4.1–5.7)
RBC # BLD AUTO: 2.61 M/UL (ref 4.1–5.7)
RBC # BLD AUTO: 2.61 M/UL (ref 4.1–5.7)
RBC # BLD AUTO: 2.64 M/UL (ref 4.1–5.7)
RBC # BLD AUTO: 2.66 M/UL (ref 4.1–5.7)
RBC # BLD AUTO: 2.67 M/UL (ref 4.1–5.7)
RBC # BLD AUTO: 2.69 M/UL (ref 4.1–5.7)
RBC # BLD AUTO: 2.69 M/UL (ref 4.1–5.7)
RBC # BLD AUTO: 2.71 M/UL (ref 4.1–5.7)
RBC # BLD AUTO: 2.77 M/UL (ref 4.1–5.7)
RBC # BLD AUTO: 2.79 M/UL (ref 4.1–5.7)
RBC # BLD AUTO: 2.83 M/UL (ref 4.1–5.7)
RBC # BLD AUTO: 2.9 M/UL (ref 4.1–5.7)
RBC # BLD AUTO: 2.92 M/UL (ref 4.1–5.7)
RBC # BLD AUTO: 2.93 M/UL (ref 4.1–5.7)
RBC # BLD AUTO: 2.93 M/UL (ref 4.1–5.7)
RBC # BLD AUTO: 3 M/UL (ref 4.1–5.7)
RBC # BLD AUTO: 3 M/UL (ref 4.1–5.7)
RBC # BLD AUTO: 3.24 M/UL (ref 4.1–5.7)
RBC # BLD AUTO: 3.27 M/UL (ref 4.1–5.7)
RBC # BLD AUTO: 3.32 M/UL (ref 4.1–5.7)
RBC # BLD AUTO: 3.37 M/UL (ref 4.1–5.7)
RBC # BLD AUTO: 3.37 M/UL (ref 4.1–5.7)
RBC # BLD AUTO: 3.46 M/UL (ref 4.1–5.7)
RBC # BLD AUTO: 3.48 M/UL (ref 4.1–5.7)
RBC # BLD AUTO: 3.52 M/UL (ref 4.1–5.7)
RBC # BLD AUTO: 3.52 M/UL (ref 4.1–5.7)
RBC # BLD AUTO: 3.56 M/UL (ref 4.1–5.7)
RBC # BLD AUTO: 3.56 M/UL (ref 4.1–5.7)
RBC # BLD AUTO: 3.57 M/UL (ref 4.1–5.7)
RBC # BLD AUTO: 3.59 M/UL (ref 4.1–5.7)
RBC # BLD AUTO: 3.64 M/UL (ref 4.1–5.7)
RBC # BLD AUTO: 3.66 M/UL (ref 4.1–5.7)
RBC # BLD AUTO: 3.71 M/UL (ref 4.1–5.7)
RBC # BLD AUTO: 3.77 M/UL (ref 4.1–5.7)
RBC # BLD AUTO: 3.85 M/UL (ref 4.1–5.7)
RBC # BLD AUTO: 4.01 M/UL (ref 4.1–5.7)
RBC # BLD AUTO: 4.08 M/UL (ref 4.1–5.7)
RBC # BLD AUTO: 4.13 M/UL (ref 4.1–5.7)
RBC # BLD AUTO: 4.31 M/UL (ref 4.1–5.7)
RBC # BLD AUTO: 4.51 M/UL (ref 4.1–5.7)
RBC # BLD AUTO: 4.61 M/UL (ref 4.1–5.7)
RBC # BLD AUTO: 4.62 M/UL (ref 4.1–5.7)
RBC # BLD AUTO: 5.03 M/UL (ref 4.1–5.7)
RBC #/AREA URNS HPF: ABNORMAL /HPF (ref 0–5)
RBC MORPH BLD: ABNORMAL
REPORTED DOSE,DOSE: ABNORMAL UNITS
REPORTED DOSE/TIME,TMG: ABNORMAL
RSV RNA SPEC QL NAA+PROBE: NOT DETECTED
RV+EV RNA SPEC QL NAA+PROBE: NOT DETECTED
SAMPLES BEING HELD,HOLD: NORMAL
SAO2 % BLD: 40 % (ref 95–99)
SAO2 % BLD: 55 % (ref 95–99)
SAO2 % BLD: 56 % (ref 95–99)
SAO2 % BLD: 58 % (ref 95–99)
SAO2 % BLD: 64 % (ref 95–99)
SAO2 % BLD: 68 % (ref 95–99)
SAO2 % BLD: 68 % (ref 95–99)
SAO2 % BLD: 71 % (ref 95–99)
SAO2 % BLD: 91.9 % (ref 92–97)
SAO2 % BLD: 93.2 % (ref 92–97)
SAO2 % BLD: 93.5 % (ref 92–97)
SAO2 % BLD: 93.8 % (ref 92–97)
SAO2 % BLD: 95 %
SAO2 % BLD: 97.7 % (ref 92–97)
SAO2 % BLD: 98 % (ref 92–97)
SAO2 % BLD: 98.2 % (ref 92–97)
SAO2 % BLD: 98.4 % (ref 92–97)
SAO2 % BLD: 99.2 % (ref 92–97)
SAO2 % BLD: 99.2 % (ref 92–97)
SAO2 % BLD: 99.3 % (ref 92–97)
SAO2 % BLD: 99.4 % (ref 92–97)
SAO2 % BLD: 99.6 % (ref 92–97)
SAO2 % BLD: 99.6 % (ref 92–97)
SAO2 % BLD: 99.7 % (ref 92–97)
SAO2 % BLD: 99.8 % (ref 92–97)
SAO2 % BLD: 99.9 % (ref 92–97)
SAO2 % BLDV: 58 % (ref 65–88)
SAO2% DEVICE SAO2% SENSOR NAME: ABNORMAL
SARS-COV-2 PCR, COVPCR: NOT DETECTED
SERVICE CMNT-IMP: ABNORMAL
SERVICE CMNT-IMP: NORMAL
SODIUM BLD-SCNC: 143 MMOL/L (ref 136–145)
SODIUM SERPL-SCNC: 131 MMOL/L (ref 136–145)
SODIUM SERPL-SCNC: 131 MMOL/L (ref 136–145)
SODIUM SERPL-SCNC: 132 MMOL/L (ref 136–145)
SODIUM SERPL-SCNC: 133 MMOL/L (ref 136–145)
SODIUM SERPL-SCNC: 134 MMOL/L (ref 136–145)
SODIUM SERPL-SCNC: 135 MMOL/L (ref 136–145)
SODIUM SERPL-SCNC: 136 MMOL/L (ref 136–145)
SODIUM SERPL-SCNC: 137 MMOL/L (ref 136–145)
SODIUM SERPL-SCNC: 138 MMOL/L (ref 136–145)
SODIUM SERPL-SCNC: 139 MMOL/L (ref 136–145)
SODIUM SERPL-SCNC: 140 MMOL/L (ref 136–145)
SODIUM SERPL-SCNC: 142 MMOL/L (ref 136–145)
SODIUM SERPL-SCNC: 143 MMOL/L (ref 136–145)
SODIUM SERPL-SCNC: 144 MMOL/L (ref 136–145)
SODIUM SERPL-SCNC: 144 MMOL/L (ref 136–145)
SODIUM SERPL-SCNC: 145 MMOL/L (ref 136–145)
SODIUM SERPL-SCNC: 145 MMOL/L (ref 136–145)
SODIUM SERPL-SCNC: 150 MMOL/L (ref 136–145)
SOURCE, COVRS: NORMAL
SP GR UR REFRACTOMETRY: 1.01 (ref 1–1.03)
SP GR UR REFRACTOMETRY: 1.02 (ref 1–1.03)
SPECIMEN EXP DATE BLD: NORMAL
SPECIMEN SITE: ABNORMAL
SPECIMEN SOURCE: NORMAL
SPECIMEN TYPE: ABNORMAL
SRA .2 IU/ML UFH SER-ACNC: <1 % (ref 0–20)
SRA 100IU/ML UFH SER-ACNC: <1 % (ref 0–20)
SRA UFH SER-IMP: NORMAL
STATUS OF UNIT,%ST: NORMAL
THERAPEUTIC RANGE,PTTT: ABNORMAL SECS (ref 58–77)
TIBC SERPL-MCNC: 152 UG/DL (ref 250–450)
TIBC SERPL-MCNC: 195 UG/DL (ref 250–450)
TIBC SERPL-MCNC: 199 UG/DL (ref 250–450)
TIBC SERPL-MCNC: 239 UG/DL (ref 250–450)
TRIGL SERPL-MCNC: 218 MG/DL (ref ?–150)
TRIGL SERPL-MCNC: 229 MG/DL (ref ?–150)
TROPONIN-HIGH SENSITIVITY: 2033 NG/L (ref 0–76)
TROPONIN-HIGH SENSITIVITY: 2512 NG/L (ref 0–76)
TROPONIN-HIGH SENSITIVITY: 3141 NG/L (ref 0–76)
TROPONIN-HIGH SENSITIVITY: 3286 NG/L (ref 0–76)
TSH SERPL DL<=0.05 MIU/L-ACNC: 1.25 UIU/ML (ref 0.36–3.74)
TSH SERPL DL<=0.05 MIU/L-ACNC: 3.52 UIU/ML (ref 0.36–3.74)
UA: UC IF INDICATED,UAUC: ABNORMAL
UA: UC IF INDICATED,UAUC: ABNORMAL
UFH PPP CHRO-ACNC: 0.13 IU/ML
UFH PPP CHRO-ACNC: 0.15 IU/ML
UFH PPP CHRO-ACNC: 0.22 IU/ML
UFH PPP CHRO-ACNC: 0.26 IU/ML
UFH PPP CHRO-ACNC: 0.31 IU/ML
UFH PPP CHRO-ACNC: 0.35 IU/ML
UFH PPP CHRO-ACNC: 0.36 IU/ML
UFH PPP CHRO-ACNC: 0.39 IU/ML
UFH PPP CHRO-ACNC: 0.66 IU/ML
UFH PPP CHRO-ACNC: 1.03 IU/ML
UFH PPP CHRO-ACNC: <0.1 IU/ML
UFH PPP CHRO-ACNC: NORMAL IU/ML
UNIT DIVISION, %UDIV: 0
UR CULT HOLD, URHOLD: NORMAL
URATE SERPL-MCNC: 1.9 MG/DL (ref 3.5–7.2)
UROBILINOGEN UR QL STRIP.AUTO: 0.2 EU/DL (ref 0.2–1)
UROBILINOGEN UR QL STRIP.AUTO: 1 EU/DL (ref 0.2–1)
UROBILINOGEN UR QL STRIP.AUTO: 1 EU/DL (ref 0.2–1)
VANCOMYCIN SERPL-MCNC: 13.5 UG/ML
VANCOMYCIN SERPL-MCNC: 13.8 UG/ML
VANCOMYCIN SERPL-MCNC: 19.5 UG/ML
VANCOMYCIN TROUGH SERPL-MCNC: 15.7 UG/ML (ref 5–10)
VENTILATION MODE VENT: ABNORMAL
VENTRICULAR RATE, ECG03: 121 BPM
VENTRICULAR RATE, ECG03: 122 BPM
VENTRICULAR RATE, ECG03: 122 BPM
VENTRICULAR RATE, ECG03: 138 BPM
VENTRICULAR RATE, ECG03: 182 BPM
VENTRICULAR RATE, ECG03: 78 BPM
VENTRICULAR RATE, ECG03: 90 BPM
VENTRICULAR RATE, ECG03: 94 BPM
VENTRICULAR RATE, ECG03: 95 BPM
VENTRICULAR RATE, ECG03: 96 BPM
VIT B12 SERPL-MCNC: 926 PG/ML (ref 193–986)
VLDLC SERPL CALC-MCNC: ABNORMAL MG/DL
VT SETTING VENT: 350
VT SETTING VENT: 350 ML
VT SETTING VENT: 370 ML
VT SETTING VENT: 380
VT SETTING VENT: 380 ML
VT SETTING VENT: 380 ML
VT SETTING VENT: 470
WBC # BLD AUTO: 10.1 K/UL (ref 4.1–11.1)
WBC # BLD AUTO: 11 K/UL (ref 4.1–11.1)
WBC # BLD AUTO: 11.4 K/UL (ref 4.1–11.1)
WBC # BLD AUTO: 11.8 K/UL (ref 4.1–11.1)
WBC # BLD AUTO: 12.1 K/UL (ref 4.1–11.1)
WBC # BLD AUTO: 13.1 K/UL (ref 4.1–11.1)
WBC # BLD AUTO: 18.3 K/UL (ref 4.1–11.1)
WBC # BLD AUTO: 20.4 K/UL (ref 4.1–11.1)
WBC # BLD AUTO: 20.8 K/UL (ref 4.1–11.1)
WBC # BLD AUTO: 3.3 K/UL (ref 4.1–11.1)
WBC # BLD AUTO: 4.9 K/UL (ref 4.1–11.1)
WBC # BLD AUTO: 5.1 K/UL (ref 4.1–11.1)
WBC # BLD AUTO: 5.3 K/UL (ref 4.1–11.1)
WBC # BLD AUTO: 5.6 K/UL (ref 4.1–11.1)
WBC # BLD AUTO: 5.6 K/UL (ref 4.1–11.1)
WBC # BLD AUTO: 5.7 K/UL (ref 4.1–11.1)
WBC # BLD AUTO: 5.8 K/UL (ref 4.1–11.1)
WBC # BLD AUTO: 6 K/UL (ref 4.1–11.1)
WBC # BLD AUTO: 6.3 K/UL (ref 4.1–11.1)
WBC # BLD AUTO: 6.3 K/UL (ref 4.1–11.1)
WBC # BLD AUTO: 6.5 K/UL (ref 4.1–11.1)
WBC # BLD AUTO: 6.6 K/UL (ref 4.1–11.1)
WBC # BLD AUTO: 6.7 K/UL (ref 4.1–11.1)
WBC # BLD AUTO: 6.8 K/UL (ref 4.1–11.1)
WBC # BLD AUTO: 6.9 K/UL (ref 4.1–11.1)
WBC # BLD AUTO: 7 K/UL (ref 4.1–11.1)
WBC # BLD AUTO: 7 K/UL (ref 4.1–11.1)
WBC # BLD AUTO: 7.1 K/UL (ref 4.1–11.1)
WBC # BLD AUTO: 7.1 K/UL (ref 4.1–11.1)
WBC # BLD AUTO: 7.4 K/UL (ref 4.1–11.1)
WBC # BLD AUTO: 7.4 K/UL (ref 4.1–11.1)
WBC # BLD AUTO: 7.6 K/UL (ref 4.1–11.1)
WBC # BLD AUTO: 7.7 K/UL (ref 4.1–11.1)
WBC # BLD AUTO: 7.9 K/UL (ref 4.1–11.1)
WBC # BLD AUTO: 8 K/UL (ref 4.1–11.1)
WBC # BLD AUTO: 8.1 K/UL (ref 4.1–11.1)
WBC # BLD AUTO: 8.4 K/UL (ref 4.1–11.1)
WBC # BLD AUTO: 8.7 K/UL (ref 4.1–11.1)
WBC # BLD AUTO: 8.8 K/UL (ref 4.1–11.1)
WBC # BLD AUTO: 8.8 K/UL (ref 4.1–11.1)
WBC # BLD AUTO: 8.9 K/UL (ref 4.1–11.1)
WBC # BLD AUTO: 9.3 K/UL (ref 4.1–11.1)
WBC # BLD AUTO: 9.4 K/UL (ref 4.1–11.1)
WBC # BLD AUTO: 9.4 K/UL (ref 4.1–11.1)
WBC # BLD AUTO: 9.6 K/UL (ref 4.1–11.1)
WBC # BLD AUTO: 9.9 K/UL (ref 4.1–11.1)
WBC MORPH BLD: ABNORMAL
WBC MORPH BLD: ABNORMAL
WBC URNS QL MICRO: >100 /HPF (ref 0–4)
WBC URNS QL MICRO: ABNORMAL /HPF (ref 0–4)
YEAST BUDDING URNS QL: PRESENT
YEAST URNS QL MICRO: PRESENT

## 2023-01-01 PROCEDURE — 85610 PROTHROMBIN TIME: CPT

## 2023-01-01 PROCEDURE — 74011000250 HC RX REV CODE- 250: Performed by: NURSE PRACTITIONER

## 2023-01-01 PROCEDURE — 83735 ASSAY OF MAGNESIUM: CPT

## 2023-01-01 PROCEDURE — 65610000003 HC RM ICU SURGICAL

## 2023-01-01 PROCEDURE — 84100 ASSAY OF PHOSPHORUS: CPT

## 2023-01-01 PROCEDURE — 97530 THERAPEUTIC ACTIVITIES: CPT

## 2023-01-01 PROCEDURE — 86922 COMPATIBILITY TEST ANTIGLOB: CPT

## 2023-01-01 PROCEDURE — 74011000250 HC RX REV CODE- 250: Performed by: STUDENT IN AN ORGANIZED HEALTH CARE EDUCATION/TRAINING PROGRAM

## 2023-01-01 PROCEDURE — 74177 CT ABD & PELVIS W/CONTRAST: CPT

## 2023-01-01 PROCEDURE — 85652 RBC SED RATE AUTOMATED: CPT

## 2023-01-01 PROCEDURE — 74011000258 HC RX REV CODE- 258: Performed by: STUDENT IN AN ORGANIZED HEALTH CARE EDUCATION/TRAINING PROGRAM

## 2023-01-01 PROCEDURE — 85027 COMPLETE CBC AUTOMATED: CPT

## 2023-01-01 PROCEDURE — 74011000258 HC RX REV CODE- 258: Performed by: INTERNAL MEDICINE

## 2023-01-01 PROCEDURE — 87340 HEPATITIS B SURFACE AG IA: CPT

## 2023-01-01 PROCEDURE — 74011250637 HC RX REV CODE- 250/637: Performed by: STUDENT IN AN ORGANIZED HEALTH CARE EDUCATION/TRAINING PROGRAM

## 2023-01-01 PROCEDURE — 80048 BASIC METABOLIC PNL TOTAL CA: CPT

## 2023-01-01 PROCEDURE — 74011250636 HC RX REV CODE- 250/636

## 2023-01-01 PROCEDURE — P9047 ALBUMIN (HUMAN), 25%, 50ML: HCPCS | Performed by: THORACIC SURGERY (CARDIOTHORACIC VASCULAR SURGERY)

## 2023-01-01 PROCEDURE — 74011636637 HC RX REV CODE- 636/637: Performed by: CLINICAL NURSE SPECIALIST

## 2023-01-01 PROCEDURE — 83615 LACTATE (LD) (LDH) ENZYME: CPT

## 2023-01-01 PROCEDURE — 85730 THROMBOPLASTIN TIME PARTIAL: CPT

## 2023-01-01 PROCEDURE — 82962 GLUCOSE BLOOD TEST: CPT

## 2023-01-01 PROCEDURE — 80053 COMPREHEN METABOLIC PANEL: CPT

## 2023-01-01 PROCEDURE — 93325 DOPPLER ECHO COLOR FLOW MAPG: CPT | Performed by: INTERNAL MEDICINE

## 2023-01-01 PROCEDURE — 99231 SBSQ HOSP IP/OBS SF/LOW 25: CPT | Performed by: CLINICAL NURSE SPECIALIST

## 2023-01-01 PROCEDURE — 84145 PROCALCITONIN (PCT): CPT

## 2023-01-01 PROCEDURE — 74011250637 HC RX REV CODE- 250/637: Performed by: ANESTHESIOLOGY

## 2023-01-01 PROCEDURE — 77030005513 HC CATH URETH FOL11 MDII -B

## 2023-01-01 PROCEDURE — 74011250636 HC RX REV CODE- 250/636: Performed by: INTERNAL MEDICINE

## 2023-01-01 PROCEDURE — 86920 COMPATIBILITY TEST SPIN: CPT

## 2023-01-01 PROCEDURE — 90945 DIALYSIS ONE EVALUATION: CPT

## 2023-01-01 PROCEDURE — 74011250637 HC RX REV CODE- 250/637: Performed by: INTERNAL MEDICINE

## 2023-01-01 PROCEDURE — 83880 ASSAY OF NATRIURETIC PEPTIDE: CPT

## 2023-01-01 PROCEDURE — 99232 SBSQ HOSP IP/OBS MODERATE 35: CPT | Performed by: NURSE PRACTITIONER

## 2023-01-01 PROCEDURE — 85025 COMPLETE CBC W/AUTO DIFF WBC: CPT

## 2023-01-01 PROCEDURE — 74011000258 HC RX REV CODE- 258: Performed by: THORACIC SURGERY (CARDIOTHORACIC VASCULAR SURGERY)

## 2023-01-01 PROCEDURE — 77010033711 HC HIGH FLOW OXYGEN

## 2023-01-01 PROCEDURE — P9045 ALBUMIN (HUMAN), 5%, 250 ML: HCPCS

## 2023-01-01 PROCEDURE — 77030000299 HC FEMSTP COMP GLD STJU -B

## 2023-01-01 PROCEDURE — B24BZZ4 ULTRASONOGRAPHY OF HEART WITH AORTA, TRANSESOPHAGEAL: ICD-10-PCS | Performed by: ANESTHESIOLOGY

## 2023-01-01 PROCEDURE — 97112 NEUROMUSCULAR REEDUCATION: CPT

## 2023-01-01 PROCEDURE — 51798 US URINE CAPACITY MEASURE: CPT

## 2023-01-01 PROCEDURE — 74011250636 HC RX REV CODE- 250/636: Performed by: THORACIC SURGERY (CARDIOTHORACIC VASCULAR SURGERY)

## 2023-01-01 PROCEDURE — 74011250637 HC RX REV CODE- 250/637: Performed by: NURSE PRACTITIONER

## 2023-01-01 PROCEDURE — 71045 X-RAY EXAM CHEST 1 VIEW: CPT

## 2023-01-01 PROCEDURE — 94660 CPAP INITIATION&MGMT: CPT

## 2023-01-01 PROCEDURE — 94003 VENT MGMT INPAT SUBQ DAY: CPT

## 2023-01-01 PROCEDURE — 99233 SBSQ HOSP IP/OBS HIGH 50: CPT | Performed by: INTERNAL MEDICINE

## 2023-01-01 PROCEDURE — 93005 ELECTROCARDIOGRAM TRACING: CPT

## 2023-01-01 PROCEDURE — 73610000005 HC INO THERAPY INITIAL

## 2023-01-01 PROCEDURE — 82803 BLOOD GASES ANY COMBINATION: CPT

## 2023-01-01 PROCEDURE — 77030037442 HC TY LVAD MGMT SYST CMP-B

## 2023-01-01 PROCEDURE — 76060000037 HC ANESTHESIA 3 TO 3.5 HR: Performed by: THORACIC SURGERY (CARDIOTHORACIC VASCULAR SURGERY)

## 2023-01-01 PROCEDURE — 84478 ASSAY OF TRIGLYCERIDES: CPT

## 2023-01-01 PROCEDURE — 99291 CRITICAL CARE FIRST HOUR: CPT | Performed by: NURSE PRACTITIONER

## 2023-01-01 PROCEDURE — 82375 ASSAY CARBOXYHB QUANT: CPT

## 2023-01-01 PROCEDURE — 71260 CT THORAX DX C+: CPT

## 2023-01-01 PROCEDURE — 93308 TTE F-UP OR LMTD: CPT

## 2023-01-01 PROCEDURE — 74011000250 HC RX REV CODE- 250: Performed by: INTERNAL MEDICINE

## 2023-01-01 PROCEDURE — 97110 THERAPEUTIC EXERCISES: CPT

## 2023-01-01 PROCEDURE — 83540 ASSAY OF IRON: CPT

## 2023-01-01 PROCEDURE — 36415 COLL VENOUS BLD VENIPUNCTURE: CPT

## 2023-01-01 PROCEDURE — 74011000250 HC RX REV CODE- 250: Performed by: THORACIC SURGERY (CARDIOTHORACIC VASCULAR SURGERY)

## 2023-01-01 PROCEDURE — 86921 COMPATIBILITY TEST INCUBATE: CPT

## 2023-01-01 PROCEDURE — C1894 INTRO/SHEATH, NON-LASER: HCPCS | Performed by: THORACIC SURGERY (CARDIOTHORACIC VASCULAR SURGERY)

## 2023-01-01 PROCEDURE — 94762 N-INVAS EAR/PLS OXIMTRY CONT: CPT

## 2023-01-01 PROCEDURE — 74011250636 HC RX REV CODE- 250/636: Performed by: STUDENT IN AN ORGANIZED HEALTH CARE EDUCATION/TRAINING PROGRAM

## 2023-01-01 PROCEDURE — 74011250636 HC RX REV CODE- 250/636: Performed by: NURSE PRACTITIONER

## 2023-01-01 PROCEDURE — P9016 RBC LEUKOCYTES REDUCED: HCPCS

## 2023-01-01 PROCEDURE — P9047 ALBUMIN (HUMAN), 25%, 50ML: HCPCS | Performed by: NURSE PRACTITIONER

## 2023-01-01 PROCEDURE — 86880 COOMBS TEST DIRECT: CPT

## 2023-01-01 PROCEDURE — 77030037878 HC DRSG MEPILEX >48IN BORD MOLN -B: Performed by: THORACIC SURGERY (CARDIOTHORACIC VASCULAR SURGERY)

## 2023-01-01 PROCEDURE — 99223 1ST HOSP IP/OBS HIGH 75: CPT | Performed by: INTERNAL MEDICINE

## 2023-01-01 PROCEDURE — 77010033678 HC OXYGEN DAILY

## 2023-01-01 PROCEDURE — 93308 TTE F-UP OR LMTD: CPT | Performed by: SPECIALIST

## 2023-01-01 PROCEDURE — 83605 ASSAY OF LACTIC ACID: CPT

## 2023-01-01 PROCEDURE — P9047 ALBUMIN (HUMAN), 25%, 50ML: HCPCS | Performed by: INTERNAL MEDICINE

## 2023-01-01 PROCEDURE — 2709999900 HC NON-CHARGEABLE SUPPLY

## 2023-01-01 PROCEDURE — 81001 URINALYSIS AUTO W/SCOPE: CPT

## 2023-01-01 PROCEDURE — 74011636637 HC RX REV CODE- 636/637

## 2023-01-01 PROCEDURE — 77030018798 HC PMP KT ENTRL FED COVD -A

## 2023-01-01 PROCEDURE — P9045 ALBUMIN (HUMAN), 5%, 250 ML: HCPCS | Performed by: STUDENT IN AN ORGANIZED HEALTH CARE EDUCATION/TRAINING PROGRAM

## 2023-01-01 PROCEDURE — 70450 CT HEAD/BRAIN W/O DYE: CPT

## 2023-01-01 PROCEDURE — 85520 HEPARIN ASSAY: CPT

## 2023-01-01 PROCEDURE — 77030041244 HC CBL PACE EXT TEMP REMG -B: Performed by: THORACIC SURGERY (CARDIOTHORACIC VASCULAR SURGERY)

## 2023-01-01 PROCEDURE — 77030040922 HC BLNKT HYPOTHRM STRY -A

## 2023-01-01 PROCEDURE — 86900 BLOOD TYPING SEROLOGIC ABO: CPT

## 2023-01-01 PROCEDURE — 83010 ASSAY OF HAPTOGLOBIN QUANT: CPT

## 2023-01-01 PROCEDURE — 82550 ASSAY OF CK (CPK): CPT

## 2023-01-01 PROCEDURE — 2709999900 HC NON-CHARGEABLE SUPPLY: Performed by: THORACIC SURGERY (CARDIOTHORACIC VASCULAR SURGERY)

## 2023-01-01 PROCEDURE — 93308 TTE F-UP OR LMTD: CPT | Performed by: INTERNAL MEDICINE

## 2023-01-01 PROCEDURE — 80307 DRUG TEST PRSMV CHEM ANLYZR: CPT

## 2023-01-01 PROCEDURE — 74176 CT ABD & PELVIS W/O CONTRAST: CPT

## 2023-01-01 PROCEDURE — 93750 INTERROGATION VAD IN PERSON: CPT | Performed by: INTERNAL MEDICINE

## 2023-01-01 PROCEDURE — 74011250637 HC RX REV CODE- 250/637: Performed by: THORACIC SURGERY (CARDIOTHORACIC VASCULAR SURGERY)

## 2023-01-01 PROCEDURE — 77030037877 HC DRSG MEPILEX >48IN BORD MOLN -A

## 2023-01-01 PROCEDURE — 77030003029 HC SUT VCRL J&J -B: Performed by: THORACIC SURGERY (CARDIOTHORACIC VASCULAR SURGERY)

## 2023-01-01 PROCEDURE — 77030013798 HC KT TRNSDUC PRSSR EDWD -B

## 2023-01-01 PROCEDURE — 74011636637 HC RX REV CODE- 636/637: Performed by: NURSE PRACTITIONER

## 2023-01-01 PROCEDURE — 93321 DOPPLER ECHO F-UP/LMTD STD: CPT | Performed by: SPECIALIST

## 2023-01-01 PROCEDURE — C9113 INJ PANTOPRAZOLE SODIUM, VIA: HCPCS | Performed by: STUDENT IN AN ORGANIZED HEALTH CARE EDUCATION/TRAINING PROGRAM

## 2023-01-01 PROCEDURE — 80162 ASSAY OF DIGOXIN TOTAL: CPT

## 2023-01-01 PROCEDURE — 74011000258 HC RX REV CODE- 258: Performed by: SURGERY

## 2023-01-01 PROCEDURE — 99285 EMERGENCY DEPT VISIT HI MDM: CPT

## 2023-01-01 PROCEDURE — 99223 1ST HOSP IP/OBS HIGH 75: CPT | Performed by: PSYCHIATRY & NEUROLOGY

## 2023-01-01 PROCEDURE — 77030002978 HC SUT POLY TELE -A: Performed by: THORACIC SURGERY (CARDIOTHORACIC VASCULAR SURGERY)

## 2023-01-01 PROCEDURE — 77030020140: Performed by: THORACIC SURGERY (CARDIOTHORACIC VASCULAR SURGERY)

## 2023-01-01 PROCEDURE — 76700 US EXAM ABDOM COMPLETE: CPT

## 2023-01-01 PROCEDURE — 87804 INFLUENZA ASSAY W/OPTIC: CPT

## 2023-01-01 PROCEDURE — 36430 TRANSFUSION BLD/BLD COMPNT: CPT

## 2023-01-01 PROCEDURE — 84443 ASSAY THYROID STIM HORMONE: CPT

## 2023-01-01 PROCEDURE — 74011000636 HC RX REV CODE- 636: Performed by: RADIOLOGY

## 2023-01-01 PROCEDURE — 65660000001 HC RM ICU INTERMED STEPDOWN

## 2023-01-01 PROCEDURE — 02HA3RJ INSERTION OF SHORT-TERM EXTERNAL HEART ASSIST SYSTEM INTO HEART, INTRAOPERATIVE, PERCUTANEOUS APPROACH: ICD-10-PCS | Performed by: THORACIC SURGERY (CARDIOTHORACIC VASCULAR SURGERY)

## 2023-01-01 PROCEDURE — 74011636637 HC RX REV CODE- 636/637: Performed by: INTERNAL MEDICINE

## 2023-01-01 PROCEDURE — B24BZZ4 ULTRASONOGRAPHY OF HEART WITH AORTA, TRANSESOPHAGEAL: ICD-10-PCS | Performed by: STUDENT IN AN ORGANIZED HEALTH CARE EDUCATION/TRAINING PROGRAM

## 2023-01-01 PROCEDURE — 85018 HEMOGLOBIN: CPT

## 2023-01-01 PROCEDURE — 74018 RADEX ABDOMEN 1 VIEW: CPT

## 2023-01-01 PROCEDURE — 99291 CRITICAL CARE FIRST HOUR: CPT | Performed by: INTERNAL MEDICINE

## 2023-01-01 PROCEDURE — 74011000258 HC RX REV CODE- 258: Performed by: NURSE PRACTITIONER

## 2023-01-01 PROCEDURE — 94640 AIRWAY INHALATION TREATMENT: CPT

## 2023-01-01 PROCEDURE — 77030013798 HC KT TRNSDUC PRSSR EDWD -B: Performed by: THORACIC SURGERY (CARDIOTHORACIC VASCULAR SURGERY)

## 2023-01-01 PROCEDURE — 74011250636 HC RX REV CODE- 250/636: Performed by: ANESTHESIOLOGY

## 2023-01-01 PROCEDURE — 97535 SELF CARE MNGMENT TRAINING: CPT

## 2023-01-01 PROCEDURE — 83690 ASSAY OF LIPASE: CPT

## 2023-01-01 PROCEDURE — 73610000026 HC INO THERAPY EACH HOUR

## 2023-01-01 PROCEDURE — 74011636637 HC RX REV CODE- 636/637: Performed by: STUDENT IN AN ORGANIZED HEALTH CARE EDUCATION/TRAINING PROGRAM

## 2023-01-01 PROCEDURE — 87040 BLOOD CULTURE FOR BACTERIA: CPT

## 2023-01-01 PROCEDURE — 97116 GAIT TRAINING THERAPY: CPT

## 2023-01-01 PROCEDURE — 94002 VENT MGMT INPAT INIT DAY: CPT

## 2023-01-01 PROCEDURE — 90935 HEMODIALYSIS ONE EVALUATION: CPT

## 2023-01-01 PROCEDURE — 77030010506 HC ADH BIOGLU CRYO -G: Performed by: THORACIC SURGERY (CARDIOTHORACIC VASCULAR SURGERY)

## 2023-01-01 PROCEDURE — 83970 ASSAY OF PARATHORMONE: CPT

## 2023-01-01 PROCEDURE — 0BH18EZ INSERTION OF ENDOTRACHEAL AIRWAY INTO TRACHEA, VIA NATURAL OR ARTIFICIAL OPENING ENDOSCOPIC: ICD-10-PCS | Performed by: STUDENT IN AN ORGANIZED HEALTH CARE EDUCATION/TRAINING PROGRAM

## 2023-01-01 PROCEDURE — 80069 RENAL FUNCTION PANEL: CPT

## 2023-01-01 PROCEDURE — 77030019905 HC CATH URETH INTMIT MDII -A: Performed by: THORACIC SURGERY (CARDIOTHORACIC VASCULAR SURGERY)

## 2023-01-01 PROCEDURE — 33979 INSERT INTRACORPOREAL DEVICE: CPT | Performed by: THORACIC SURGERY (CARDIOTHORACIC VASCULAR SURGERY)

## 2023-01-01 PROCEDURE — 86860 RBC ANTIBODY ELUTION: CPT

## 2023-01-01 PROCEDURE — 82977 ASSAY OF GGT: CPT

## 2023-01-01 PROCEDURE — C1751 CATH, INF, PER/CENT/MIDLINE: HCPCS

## 2023-01-01 PROCEDURE — C1769 GUIDE WIRE: HCPCS | Performed by: THORACIC SURGERY (CARDIOTHORACIC VASCULAR SURGERY)

## 2023-01-01 PROCEDURE — 93321 DOPPLER ECHO F-UP/LMTD STD: CPT | Performed by: INTERNAL MEDICINE

## 2023-01-01 PROCEDURE — 82947 ASSAY GLUCOSE BLOOD QUANT: CPT

## 2023-01-01 PROCEDURE — 77030019702 HC WRP THER MENM -C: Performed by: THORACIC SURGERY (CARDIOTHORACIC VASCULAR SURGERY)

## 2023-01-01 PROCEDURE — 87449 NOS EACH ORGANISM AG IA: CPT

## 2023-01-01 PROCEDURE — 86870 RBC ANTIBODY IDENTIFICATION: CPT

## 2023-01-01 PROCEDURE — 74011250636 HC RX REV CODE- 250/636: Performed by: SURGERY

## 2023-01-01 PROCEDURE — 83050 HGB METHEMOGLOBIN QUAN: CPT

## 2023-01-01 PROCEDURE — 80202 ASSAY OF VANCOMYCIN: CPT

## 2023-01-01 PROCEDURE — 84550 ASSAY OF BLOOD/URIC ACID: CPT

## 2023-01-01 PROCEDURE — 80076 HEPATIC FUNCTION PANEL: CPT

## 2023-01-01 PROCEDURE — 82533 TOTAL CORTISOL: CPT

## 2023-01-01 PROCEDURE — 99223 1ST HOSP IP/OBS HIGH 75: CPT | Performed by: SPECIALIST

## 2023-01-01 PROCEDURE — 74011250636 HC RX REV CODE- 250/636: Performed by: EMERGENCY MEDICINE

## 2023-01-01 PROCEDURE — 84484 ASSAY OF TROPONIN QUANT: CPT

## 2023-01-01 PROCEDURE — 34714 OPN FEM ART EXPOS CNDT CRTJ: CPT | Performed by: PHYSICIAN ASSISTANT

## 2023-01-01 PROCEDURE — 74011000258 HC RX REV CODE- 258

## 2023-01-01 PROCEDURE — 88313 SPECIAL STAINS GROUP 2: CPT

## 2023-01-01 PROCEDURE — 77030041952: Performed by: THORACIC SURGERY (CARDIOTHORACIC VASCULAR SURGERY)

## 2023-01-01 PROCEDURE — 87086 URINE CULTURE/COLONY COUNT: CPT

## 2023-01-01 PROCEDURE — 84134 ASSAY OF PREALBUMIN: CPT

## 2023-01-01 PROCEDURE — 71250 CT THORAX DX C-: CPT

## 2023-01-01 PROCEDURE — 94760 N-INVAS EAR/PLS OXIMETRY 1: CPT

## 2023-01-01 PROCEDURE — 82248 BILIRUBIN DIRECT: CPT

## 2023-01-01 PROCEDURE — C1751 CATH, INF, PER/CENT/MIDLINE: HCPCS | Performed by: THORACIC SURGERY (CARDIOTHORACIC VASCULAR SURGERY)

## 2023-01-01 PROCEDURE — 74011636637 HC RX REV CODE- 636/637: Performed by: FAMILY MEDICINE

## 2023-01-01 PROCEDURE — 82728 ASSAY OF FERRITIN: CPT

## 2023-01-01 PROCEDURE — 93325 DOPPLER ECHO COLOR FLOW MAPG: CPT | Performed by: SPECIALIST

## 2023-01-01 PROCEDURE — 77030014004 HC SPNG HELISTAT INLC -C: Performed by: THORACIC SURGERY (CARDIOTHORACIC VASCULAR SURGERY)

## 2023-01-01 PROCEDURE — 74011000250 HC RX REV CODE- 250: Performed by: ANESTHESIOLOGY

## 2023-01-01 PROCEDURE — 33992 RMVL PERQ LEFT HEART VAD: CPT | Performed by: THORACIC SURGERY (CARDIOTHORACIC VASCULAR SURGERY)

## 2023-01-01 PROCEDURE — 77030002933 HC SUT MCRYL J&J -A: Performed by: THORACIC SURGERY (CARDIOTHORACIC VASCULAR SURGERY)

## 2023-01-01 PROCEDURE — 02HV33Z INSERTION OF INFUSION DEVICE INTO SUPERIOR VENA CAVA, PERCUTANEOUS APPROACH: ICD-10-PCS | Performed by: STUDENT IN AN ORGANIZED HEALTH CARE EDUCATION/TRAINING PROGRAM

## 2023-01-01 PROCEDURE — 74011000258 HC RX REV CODE- 258: Performed by: PHYSICIAN ASSISTANT

## 2023-01-01 PROCEDURE — 86738 MYCOPLASMA ANTIBODY: CPT

## 2023-01-01 PROCEDURE — 77030018861

## 2023-01-01 PROCEDURE — 86316 IMMUNOASSAY TUMOR OTHER: CPT

## 2023-01-01 PROCEDURE — 76010000119 HC CV SURG 6.5 TO 7 HR: Performed by: THORACIC SURGERY (CARDIOTHORACIC VASCULAR SURGERY)

## 2023-01-01 PROCEDURE — 5A02116 ASSISTANCE WITH CARDIAC OUTPUT USING OTHER PUMP, INTERMITTENT: ICD-10-PCS | Performed by: THORACIC SURGERY (CARDIOTHORACIC VASCULAR SURGERY)

## 2023-01-01 PROCEDURE — 88305 TISSUE EXAM BY PATHOLOGIST: CPT

## 2023-01-01 PROCEDURE — 71046 X-RAY EXAM CHEST 2 VIEWS: CPT

## 2023-01-01 PROCEDURE — 99233 SBSQ HOSP IP/OBS HIGH 50: CPT | Performed by: NURSE PRACTITIONER

## 2023-01-01 PROCEDURE — 77030005402 HC CATH RAD ART LN KT TELE -B

## 2023-01-01 PROCEDURE — 77030002996 HC SUT SLK J&J -A: Performed by: THORACIC SURGERY (CARDIOTHORACIC VASCULAR SURGERY)

## 2023-01-01 PROCEDURE — 74011250636 HC RX REV CODE- 250/636: Performed by: PHYSICIAN ASSISTANT

## 2023-01-01 PROCEDURE — 77030034868 HC PMP CARD PERC IMPELLA RP ABIM -L: Performed by: THORACIC SURGERY (CARDIOTHORACIC VASCULAR SURGERY)

## 2023-01-01 PROCEDURE — 74011250637 HC RX REV CODE- 250/637

## 2023-01-01 PROCEDURE — 51702 INSERT TEMP BLADDER CATH: CPT

## 2023-01-01 PROCEDURE — 77030038606 HC IMPL VAD ASST HRTMT III W/CNTRL STJU -L: Performed by: THORACIC SURGERY (CARDIOTHORACIC VASCULAR SURGERY)

## 2023-01-01 PROCEDURE — 86140 C-REACTIVE PROTEIN: CPT

## 2023-01-01 PROCEDURE — 82043 UR ALBUMIN QUANTITATIVE: CPT

## 2023-01-01 PROCEDURE — 86706 HEP B SURFACE ANTIBODY: CPT

## 2023-01-01 PROCEDURE — 77030041076 HC DRSG AG OPTICELL MDII -A

## 2023-01-01 PROCEDURE — 80061 LIPID PANEL: CPT

## 2023-01-01 PROCEDURE — 82140 ASSAY OF AMMONIA: CPT

## 2023-01-01 PROCEDURE — 94761 N-INVAS EAR/PLS OXIMETRY MLT: CPT

## 2023-01-01 PROCEDURE — P9045 ALBUMIN (HUMAN), 5%, 250 ML: HCPCS | Performed by: INTERNAL MEDICINE

## 2023-01-01 PROCEDURE — 77030034689 HC VASC DIL KT W/NDL LIVA -C: Performed by: THORACIC SURGERY (CARDIOTHORACIC VASCULAR SURGERY)

## 2023-01-01 PROCEDURE — P9045 ALBUMIN (HUMAN), 5%, 250 ML: HCPCS | Performed by: NURSE PRACTITIONER

## 2023-01-01 PROCEDURE — 99222 1ST HOSP IP/OBS MODERATE 55: CPT | Performed by: CLINICAL NURSE SPECIALIST

## 2023-01-01 PROCEDURE — 74011000250 HC RX REV CODE- 250

## 2023-01-01 PROCEDURE — 93355 ECHO TRANSESOPHAGEAL (TEE): CPT | Performed by: THORACIC SURGERY (CARDIOTHORACIC VASCULAR SURGERY)

## 2023-01-01 PROCEDURE — 74011000250 HC RX REV CODE- 250: Performed by: SURGERY

## 2023-01-01 PROCEDURE — 97162 PT EVAL MOD COMPLEX 30 MIN: CPT

## 2023-01-01 PROCEDURE — 86141 C-REACTIVE PROTEIN HS: CPT

## 2023-01-01 PROCEDURE — 0202U NFCT DS 22 TRGT SARS-COV-2: CPT

## 2023-01-01 PROCEDURE — 77030014491 HC PLEDG PTFE BARD -A: Performed by: THORACIC SURGERY (CARDIOTHORACIC VASCULAR SURGERY)

## 2023-01-01 PROCEDURE — 77030014008 HC SPNG HEMSTAT J&J -C: Performed by: THORACIC SURGERY (CARDIOTHORACIC VASCULAR SURGERY)

## 2023-01-01 PROCEDURE — 76010000110 HC CV SURG 3 TO 3.5 HR: Performed by: THORACIC SURGERY (CARDIOTHORACIC VASCULAR SURGERY)

## 2023-01-01 PROCEDURE — 77030012390 HC DRN CHST BTL GTNG -B: Performed by: THORACIC SURGERY (CARDIOTHORACIC VASCULAR SURGERY)

## 2023-01-01 PROCEDURE — 77030012390 HC DRN CHST BTL GTNG -B

## 2023-01-01 PROCEDURE — 77030037878 HC DRSG MEPILEX >48IN BORD MOLN -B

## 2023-01-01 PROCEDURE — 99291 CRITICAL CARE FIRST HOUR: CPT | Performed by: THORACIC SURGERY (CARDIOTHORACIC VASCULAR SURGERY)

## 2023-01-01 PROCEDURE — 74011000250 HC RX REV CODE- 250: Performed by: EMERGENCY MEDICINE

## 2023-01-01 PROCEDURE — 99223 1ST HOSP IP/OBS HIGH 75: CPT | Performed by: NURSE PRACTITIONER

## 2023-01-01 PROCEDURE — 76060000044 HC ANESTHESIA 6.5 TO 7 HR: Performed by: THORACIC SURGERY (CARDIOTHORACIC VASCULAR SURGERY)

## 2023-01-01 PROCEDURE — 93971 EXTREMITY STUDY: CPT

## 2023-01-01 PROCEDURE — 77030019905 HC CATH URETH INTMIT MDII -A

## 2023-01-01 PROCEDURE — 5A1221Z PERFORMANCE OF CARDIAC OUTPUT, CONTINUOUS: ICD-10-PCS | Performed by: THORACIC SURGERY (CARDIOTHORACIC VASCULAR SURGERY)

## 2023-01-01 PROCEDURE — 99232 SBSQ HOSP IP/OBS MODERATE 35: CPT | Performed by: PSYCHIATRY & NEUROLOGY

## 2023-01-01 PROCEDURE — 97161 PT EVAL LOW COMPLEX 20 MIN: CPT

## 2023-01-01 PROCEDURE — 33979 INSERT INTRACORPOREAL DEVICE: CPT | Performed by: PHYSICIAN ASSISTANT

## 2023-01-01 PROCEDURE — 02HA0QZ INSERTION OF IMPLANTABLE HEART ASSIST SYSTEM INTO HEART, OPEN APPROACH: ICD-10-PCS | Performed by: THORACIC SURGERY (CARDIOTHORACIC VASCULAR SURGERY)

## 2023-01-01 PROCEDURE — 97165 OT EVAL LOW COMPLEX 30 MIN: CPT

## 2023-01-01 PROCEDURE — 87070 CULTURE OTHR SPECIMN AEROBIC: CPT

## 2023-01-01 PROCEDURE — 77030013861 HC PNCH AORT CLNCUT QUES -B: Performed by: THORACIC SURGERY (CARDIOTHORACIC VASCULAR SURGERY)

## 2023-01-01 PROCEDURE — P9045 ALBUMIN (HUMAN), 5%, 250 ML: HCPCS | Performed by: ANESTHESIOLOGY

## 2023-01-01 PROCEDURE — 87205 SMEAR GRAM STAIN: CPT

## 2023-01-01 PROCEDURE — 77030015757: Performed by: THORACIC SURGERY (CARDIOTHORACIC VASCULAR SURGERY)

## 2023-01-01 PROCEDURE — 34714 OPN FEM ART EXPOS CNDT CRTJ: CPT | Performed by: THORACIC SURGERY (CARDIOTHORACIC VASCULAR SURGERY)

## 2023-01-01 PROCEDURE — 70486 CT MAXILLOFACIAL W/O DYE: CPT

## 2023-01-01 PROCEDURE — 82607 VITAMIN B-12: CPT

## 2023-01-01 PROCEDURE — 77030040504 HC DRN WND MDII -B: Performed by: THORACIC SURGERY (CARDIOTHORACIC VASCULAR SURGERY)

## 2023-01-01 PROCEDURE — 74011250637 HC RX REV CODE- 250/637: Performed by: FAMILY MEDICINE

## 2023-01-01 PROCEDURE — 77030040830 HC CATH URETH FOL MDII -A: Performed by: THORACIC SURGERY (CARDIOTHORACIC VASCULAR SURGERY)

## 2023-01-01 PROCEDURE — 87077 CULTURE AEROBIC IDENTIFY: CPT

## 2023-01-01 PROCEDURE — 0BC18ZZ EXTIRPATION OF MATTER FROM TRACHEA, VIA NATURAL OR ARTIFICIAL OPENING ENDOSCOPIC: ICD-10-PCS | Performed by: ANESTHESIOLOGY

## 2023-01-01 PROCEDURE — C1768 GRAFT, VASCULAR: HCPCS | Performed by: THORACIC SURGERY (CARDIOTHORACIC VASCULAR SURGERY)

## 2023-01-01 PROCEDURE — 77030003020 HC SUT TICRN COVD -A: Performed by: THORACIC SURGERY (CARDIOTHORACIC VASCULAR SURGERY)

## 2023-01-01 PROCEDURE — 0042T CT CODE NEURO PERF W CBF: CPT

## 2023-01-01 PROCEDURE — 99232 SBSQ HOSP IP/OBS MODERATE 35: CPT | Performed by: INTERNAL MEDICINE

## 2023-01-01 PROCEDURE — 82941 ASSAY OF GASTRIN: CPT

## 2023-01-01 PROCEDURE — 77030002986 HC SUT PROL J&J -A: Performed by: THORACIC SURGERY (CARDIOTHORACIC VASCULAR SURGERY)

## 2023-01-01 PROCEDURE — 86901 BLOOD TYPING SEROLOGIC RH(D): CPT

## 2023-01-01 PROCEDURE — 77030041175 HC BAG DIGNSHLD BARD -A

## 2023-01-01 PROCEDURE — 77030003010 HC SUT SURG STL J&J -B: Performed by: THORACIC SURGERY (CARDIOTHORACIC VASCULAR SURGERY)

## 2023-01-01 PROCEDURE — 87635 SARS-COV-2 COVID-19 AMP PRB: CPT

## 2023-01-01 PROCEDURE — P9047 ALBUMIN (HUMAN), 25%, 50ML: HCPCS

## 2023-01-01 PROCEDURE — C8923 2D TTE W OR W/O FOL W/CON,CO: HCPCS

## 2023-01-01 PROCEDURE — 82746 ASSAY OF FOLIC ACID SERUM: CPT

## 2023-01-01 PROCEDURE — 97164 PT RE-EVAL EST PLAN CARE: CPT

## 2023-01-01 PROCEDURE — 96374 THER/PROPH/DIAG INJ IV PUSH: CPT

## 2023-01-01 PROCEDURE — 86850 RBC ANTIBODY SCREEN: CPT

## 2023-01-01 PROCEDURE — 97168 OT RE-EVAL EST PLAN CARE: CPT

## 2023-01-01 PROCEDURE — C9113 INJ PANTOPRAZOLE SODIUM, VIA: HCPCS | Performed by: ANESTHESIOLOGY

## 2023-01-01 PROCEDURE — 99497 ADVNCD CARE PLAN 30 MIN: CPT | Performed by: NURSE PRACTITIONER

## 2023-01-01 PROCEDURE — 74011250636 HC RX REV CODE- 250/636: Performed by: SPECIALIST

## 2023-01-01 PROCEDURE — 77030011235 HC DRN PERCRD SUMP MEDT -B: Performed by: THORACIC SURGERY (CARDIOTHORACIC VASCULAR SURGERY)

## 2023-01-01 PROCEDURE — 86022 PLATELET ANTIBODIES: CPT

## 2023-01-01 PROCEDURE — 4A03X5D MEASUREMENT OF ARTERIAL FLOW, INTRACRANIAL, EXTERNAL APPROACH: ICD-10-PCS | Performed by: RADIOLOGY

## 2023-01-01 PROCEDURE — 5A0221D ASSISTANCE WITH CARDIAC OUTPUT USING IMPELLER PUMP, CONTINUOUS: ICD-10-PCS | Performed by: THORACIC SURGERY (CARDIOTHORACIC VASCULAR SURGERY)

## 2023-01-01 PROCEDURE — 70496 CT ANGIOGRAPHY HEAD: CPT

## 2023-01-01 PROCEDURE — 99233 SBSQ HOSP IP/OBS HIGH 50: CPT | Performed by: SPECIALIST

## 2023-01-01 PROCEDURE — P9073 PLATELETS PHERESIS PATH REDU: HCPCS

## 2023-01-01 PROCEDURE — 5A1955Z RESPIRATORY VENTILATION, GREATER THAN 96 CONSECUTIVE HOURS: ICD-10-PCS | Performed by: STUDENT IN AN ORGANIZED HEALTH CARE EDUCATION/TRAINING PROGRAM

## 2023-01-01 PROCEDURE — 82542 COL CHROMOTOGRAPHY QUAL/QUAN: CPT

## 2023-01-01 PROCEDURE — 02PA3RZ REMOVAL OF SHORT-TERM EXTERNAL HEART ASSIST SYSTEM FROM HEART, PERCUTANEOUS APPROACH: ICD-10-PCS | Performed by: THORACIC SURGERY (CARDIOTHORACIC VASCULAR SURGERY)

## 2023-01-01 DEVICE — GRAFT VASC L20CM ID20MM STRTCH WALL GOR TX: Type: IMPLANTABLE DEVICE | Site: HEART | Status: FUNCTIONAL

## 2023-01-01 DEVICE — DEVICE IMPL VENTRCLR ASST KIT -- HEARTMATE III: Type: IMPLANTABLE DEVICE | Site: HEART | Status: FUNCTIONAL

## 2023-01-01 RX ORDER — OXYCODONE HYDROCHLORIDE 5 MG/1
5 TABLET ORAL
Status: DISCONTINUED | OUTPATIENT
Start: 2023-01-01 | End: 2023-01-01 | Stop reason: HOSPADM

## 2023-01-01 RX ORDER — HEPARIN SODIUM 10000 [USP'U]/100ML
10 INJECTION, SOLUTION INTRAVENOUS CONTINUOUS
Status: DISCONTINUED | OUTPATIENT
Start: 2023-01-01 | End: 2023-01-01

## 2023-01-01 RX ORDER — BUMETANIDE 1 MG/1
1 TABLET ORAL 2 TIMES DAILY
Status: DISCONTINUED | OUTPATIENT
Start: 2023-01-01 | End: 2023-01-01

## 2023-01-01 RX ORDER — CINACALCET 30 MG/1
30 TABLET, FILM COATED ORAL
Status: DISCONTINUED | OUTPATIENT
Start: 2023-01-01 | End: 2023-01-01

## 2023-01-01 RX ORDER — MIDAZOLAM HYDROCHLORIDE 1 MG/ML
1 INJECTION, SOLUTION INTRAMUSCULAR; INTRAVENOUS AS NEEDED
Status: DISCONTINUED | OUTPATIENT
Start: 2023-01-01 | End: 2023-01-01 | Stop reason: HOSPADM

## 2023-01-01 RX ORDER — BACITRACIN 500 UNIT/G
1 PACKET (EA) TOPICAL AS NEEDED
Status: DISCONTINUED | OUTPATIENT
Start: 2023-01-01 | End: 2023-01-01

## 2023-01-01 RX ORDER — PROPOFOL 10 MG/ML
0-50 VIAL (ML) INTRAVENOUS
Status: DISCONTINUED | OUTPATIENT
Start: 2023-01-01 | End: 2023-01-01

## 2023-01-01 RX ORDER — CINACALCET 30 MG/1
30 TABLET, FILM COATED ORAL ONCE
Status: COMPLETED | OUTPATIENT
Start: 2023-01-01 | End: 2023-01-01

## 2023-01-01 RX ORDER — ALBUMIN HUMAN 250 G/1000ML
25 SOLUTION INTRAVENOUS ONCE
Status: COMPLETED | OUTPATIENT
Start: 2023-01-01 | End: 2023-01-01

## 2023-01-01 RX ORDER — DEXMEDETOMIDINE HYDROCHLORIDE 4 UG/ML
.1-1.5 INJECTION, SOLUTION INTRAVENOUS
Status: DISCONTINUED | OUTPATIENT
Start: 2023-01-01 | End: 2023-01-01

## 2023-01-01 RX ORDER — WARFARIN 1 MG/1
1 TABLET ORAL ONCE
Status: COMPLETED | OUTPATIENT
Start: 2023-01-01 | End: 2023-01-01

## 2023-01-01 RX ORDER — INSULIN LISPRO 100 [IU]/ML
INJECTION, SOLUTION INTRAVENOUS; SUBCUTANEOUS EVERY 4 HOURS
Status: DISCONTINUED | OUTPATIENT
Start: 2023-01-01 | End: 2023-01-01

## 2023-01-01 RX ORDER — ONDANSETRON 2 MG/ML
4 INJECTION INTRAMUSCULAR; INTRAVENOUS AS NEEDED
Status: DISCONTINUED | OUTPATIENT
Start: 2023-01-01 | End: 2023-01-01

## 2023-01-01 RX ORDER — HYDROMORPHONE HYDROCHLORIDE 1 MG/ML
1 INJECTION, SOLUTION INTRAMUSCULAR; INTRAVENOUS; SUBCUTANEOUS
Status: DISCONTINUED | OUTPATIENT
Start: 2023-01-01 | End: 2023-01-01 | Stop reason: HOSPADM

## 2023-01-01 RX ORDER — LEVETIRACETAM 500 MG/5ML
250 INJECTION, SOLUTION, CONCENTRATE INTRAVENOUS
Status: DISCONTINUED | OUTPATIENT
Start: 2023-01-01 | End: 2023-01-01

## 2023-01-01 RX ORDER — AMOXICILLIN 250 MG
1 CAPSULE ORAL DAILY
Status: DISCONTINUED | OUTPATIENT
Start: 2023-01-01 | End: 2023-01-01

## 2023-01-01 RX ORDER — PANTOPRAZOLE SODIUM 40 MG/1
40 TABLET, DELAYED RELEASE ORAL
Status: DISCONTINUED | OUTPATIENT
Start: 2023-01-01 | End: 2023-01-01

## 2023-01-01 RX ORDER — INSULIN GLARGINE 100 [IU]/ML
10 INJECTION, SOLUTION SUBCUTANEOUS DAILY
Status: DISCONTINUED | OUTPATIENT
Start: 2023-01-01 | End: 2023-01-01

## 2023-01-01 RX ORDER — ROCURONIUM BROMIDE 10 MG/ML
INJECTION, SOLUTION INTRAVENOUS
Status: DISPENSED
Start: 2023-01-01 | End: 2023-01-01

## 2023-01-01 RX ORDER — LABETALOL HYDROCHLORIDE 5 MG/ML
5 INJECTION, SOLUTION INTRAVENOUS
Status: DISCONTINUED | OUTPATIENT
Start: 2023-01-01 | End: 2023-01-01 | Stop reason: HOSPADM

## 2023-01-01 RX ORDER — EPINEPHRINE 0.1 MG/ML
INJECTION INTRACARDIAC; INTRAVENOUS
Status: DISPENSED
Start: 2023-01-01 | End: 2023-01-01

## 2023-01-01 RX ORDER — POTASSIUM CHLORIDE 29.8 MG/ML
20 INJECTION INTRAVENOUS ONCE
Status: DISCONTINUED | OUTPATIENT
Start: 2023-01-01 | End: 2023-01-01 | Stop reason: HOSPADM

## 2023-01-01 RX ORDER — KETOROLAC TROMETHAMINE 30 MG/ML
15 INJECTION, SOLUTION INTRAMUSCULAR; INTRAVENOUS
Status: DISCONTINUED | OUTPATIENT
Start: 2023-01-01 | End: 2023-01-01 | Stop reason: HOSPADM

## 2023-01-01 RX ORDER — INSULIN LISPRO 100 [IU]/ML
INJECTION, SOLUTION INTRAVENOUS; SUBCUTANEOUS EVERY 6 HOURS
Status: DISCONTINUED | OUTPATIENT
Start: 2023-01-01 | End: 2023-01-01

## 2023-01-01 RX ORDER — BUMETANIDE 0.25 MG/ML
0.5 INJECTION INTRAMUSCULAR; INTRAVENOUS
Status: DISCONTINUED | OUTPATIENT
Start: 2023-01-01 | End: 2023-01-01

## 2023-01-01 RX ORDER — POLYETHYLENE GLYCOL 3350 17 G/17G
17 POWDER, FOR SOLUTION ORAL DAILY PRN
Status: DISCONTINUED | OUTPATIENT
Start: 2023-01-01 | End: 2023-01-01 | Stop reason: SDUPTHER

## 2023-01-01 RX ORDER — WARFARIN 2 MG/1
2 TABLET ORAL ONCE
Status: COMPLETED | OUTPATIENT
Start: 2023-01-01 | End: 2023-01-01

## 2023-01-01 RX ORDER — POTASSIUM CHLORIDE 29.8 MG/ML
20 INJECTION INTRAVENOUS
Status: COMPLETED | OUTPATIENT
Start: 2023-01-01 | End: 2023-01-01

## 2023-01-01 RX ORDER — MILRINONE LACTATE 0.2 MG/ML
0-.75 INJECTION, SOLUTION INTRAVENOUS
Status: DISCONTINUED | OUTPATIENT
Start: 2023-01-01 | End: 2023-01-01

## 2023-01-01 RX ORDER — HEPARIN SODIUM 1000 [USP'U]/ML
3264 INJECTION, SOLUTION INTRAVENOUS; SUBCUTANEOUS ONCE
Status: COMPLETED | OUTPATIENT
Start: 2023-01-01 | End: 2023-01-01

## 2023-01-01 RX ORDER — PHENYLEPHRINE HCL IN 0.9% NACL 0.4MG/10ML
SYRINGE (ML) INTRAVENOUS
Status: DISPENSED
Start: 2023-01-01 | End: 2023-01-01

## 2023-01-01 RX ORDER — ROCURONIUM BROMIDE 10 MG/ML
50 INJECTION, SOLUTION INTRAVENOUS ONCE
Status: COMPLETED | OUTPATIENT
Start: 2023-01-01 | End: 2023-01-01

## 2023-01-01 RX ORDER — ALBUMIN HUMAN 250 G/1000ML
12.5 SOLUTION INTRAVENOUS ONCE
Status: COMPLETED | OUTPATIENT
Start: 2023-01-01 | End: 2023-01-01

## 2023-01-01 RX ORDER — INSULIN LISPRO 100 [IU]/ML
4 INJECTION, SOLUTION INTRAVENOUS; SUBCUTANEOUS
Status: DISCONTINUED | OUTPATIENT
Start: 2023-01-01 | End: 2023-01-01

## 2023-01-01 RX ORDER — MILRINONE LACTATE 0.2 MG/ML
0.3 INJECTION, SOLUTION INTRAVENOUS CONTINUOUS
Status: DISCONTINUED | OUTPATIENT
Start: 2023-01-01 | End: 2023-01-01

## 2023-01-01 RX ORDER — ALBUMIN HUMAN 50 G/1000ML
25 SOLUTION INTRAVENOUS ONCE
Status: COMPLETED | OUTPATIENT
Start: 2023-01-01 | End: 2023-01-01

## 2023-01-01 RX ORDER — BUMETANIDE 1 MG/1
1 TABLET ORAL DAILY
Status: DISCONTINUED | OUTPATIENT
Start: 2023-01-01 | End: 2023-01-01

## 2023-01-01 RX ORDER — BUMETANIDE 0.25 MG/ML
1.5 INJECTION INTRAMUSCULAR; INTRAVENOUS ONCE
Status: COMPLETED | OUTPATIENT
Start: 2023-01-01 | End: 2023-01-01

## 2023-01-01 RX ORDER — PHENYLEPHRINE HCL IN 0.9% NACL 0.4MG/10ML
200 SYRINGE (ML) INTRAVENOUS ONCE
Status: COMPLETED | OUTPATIENT
Start: 2023-01-01 | End: 2023-01-01

## 2023-01-01 RX ORDER — CHLORHEXIDINE GLUCONATE 1.2 MG/ML
10 RINSE ORAL 2 TIMES DAILY
Status: ACTIVE | OUTPATIENT
Start: 2023-01-01 | End: 2023-01-01

## 2023-01-01 RX ORDER — SODIUM CHLORIDE 9 MG/ML
250 INJECTION, SOLUTION INTRAVENOUS AS NEEDED
Status: DISCONTINUED | OUTPATIENT
Start: 2023-01-01 | End: 2023-01-01 | Stop reason: SDUPTHER

## 2023-01-01 RX ORDER — DOBUTAMINE HYDROCHLORIDE 400 MG/100ML
0-10 INJECTION INTRAVENOUS
Status: DISCONTINUED | OUTPATIENT
Start: 2023-01-01 | End: 2023-01-01 | Stop reason: CLARIF

## 2023-01-01 RX ORDER — POTASSIUM CHLORIDE 29.8 MG/ML
20 INJECTION INTRAVENOUS ONCE
Status: COMPLETED | OUTPATIENT
Start: 2023-01-01 | End: 2023-01-01

## 2023-01-01 RX ORDER — ADENOSINE 3 MG/ML
12 INJECTION, SOLUTION INTRAVENOUS ONCE
Status: COMPLETED | OUTPATIENT
Start: 2023-01-01 | End: 2023-01-01

## 2023-01-01 RX ORDER — DOBUTAMINE HYDROCHLORIDE 200 MG/100ML
0-10 INJECTION INTRAVENOUS
Status: DISCONTINUED | OUTPATIENT
Start: 2023-01-01 | End: 2023-01-01

## 2023-01-01 RX ORDER — FUROSEMIDE 10 MG/ML
40 INJECTION INTRAMUSCULAR; INTRAVENOUS ONCE
Status: COMPLETED | OUTPATIENT
Start: 2023-01-01 | End: 2023-01-01

## 2023-01-01 RX ORDER — LEVETIRACETAM 500 MG/5ML
500 INJECTION, SOLUTION, CONCENTRATE INTRAVENOUS EVERY 12 HOURS
Status: DISCONTINUED | OUTPATIENT
Start: 2023-01-01 | End: 2023-01-01

## 2023-01-01 RX ORDER — HYDROMORPHONE HCL/0.9% NACL/PF 30 MG/30ML
.5-1 PATIENT CONTROLLED ANALGESIA SYRINGE INTRAVENOUS CONTINUOUS
Status: DISCONTINUED | OUTPATIENT
Start: 2023-01-01 | End: 2023-01-01

## 2023-01-01 RX ORDER — GUAIFENESIN 100 MG/5ML
81 LIQUID (ML) ORAL DAILY
Status: DISCONTINUED | OUTPATIENT
Start: 2023-01-01 | End: 2023-01-01 | Stop reason: SDUPTHER

## 2023-01-01 RX ORDER — SODIUM CHLORIDE 0.9 % (FLUSH) 0.9 %
5-40 SYRINGE (ML) INJECTION EVERY 8 HOURS
Status: DISCONTINUED | OUTPATIENT
Start: 2023-01-01 | End: 2023-01-01 | Stop reason: HOSPADM

## 2023-01-01 RX ORDER — HEPARIN SODIUM 1000 [USP'U]/ML
30 INJECTION, SOLUTION INTRAVENOUS; SUBCUTANEOUS ONCE
Status: COMPLETED | OUTPATIENT
Start: 2023-01-01 | End: 2023-01-01

## 2023-01-01 RX ORDER — HYDRALAZINE HYDROCHLORIDE 20 MG/ML
5 INJECTION INTRAMUSCULAR; INTRAVENOUS
Status: DISCONTINUED | OUTPATIENT
Start: 2023-01-01 | End: 2023-01-01 | Stop reason: HOSPADM

## 2023-01-01 RX ORDER — ALBUMIN HUMAN 50 G/1000ML
SOLUTION INTRAVENOUS
Status: DISPENSED
Start: 2023-01-01 | End: 2023-01-01

## 2023-01-01 RX ORDER — FLUCONAZOLE 2 MG/ML
400 INJECTION, SOLUTION INTRAVENOUS EVERY 24 HOURS
Status: DISCONTINUED | OUTPATIENT
Start: 2023-01-01 | End: 2023-01-01

## 2023-01-01 RX ORDER — COLCHICINE 0.6 MG/1
0.6 TABLET ORAL DAILY
Status: DISCONTINUED | OUTPATIENT
Start: 2023-01-01 | End: 2023-01-01 | Stop reason: HOSPADM

## 2023-01-01 RX ORDER — ADENOSINE 3 MG/ML
6 INJECTION, SOLUTION INTRAVENOUS ONCE
Status: COMPLETED | OUTPATIENT
Start: 2023-01-01 | End: 2023-01-01

## 2023-01-01 RX ORDER — IPRATROPIUM BROMIDE AND ALBUTEROL SULFATE 2.5; .5 MG/3ML; MG/3ML
3 SOLUTION RESPIRATORY (INHALATION)
Status: DISCONTINUED | OUTPATIENT
Start: 2023-01-01 | End: 2023-01-01

## 2023-01-01 RX ORDER — FLUCONAZOLE 2 MG/ML
200 INJECTION, SOLUTION INTRAVENOUS ONCE
Status: COMPLETED | OUTPATIENT
Start: 2023-01-01 | End: 2023-01-01

## 2023-01-01 RX ORDER — HEPARIN SODIUM 10000 [USP'U]/100ML
12-25 INJECTION, SOLUTION INTRAVENOUS
Status: DISCONTINUED | OUTPATIENT
Start: 2023-01-01 | End: 2023-01-01

## 2023-01-01 RX ORDER — FENTANYL CITRATE 50 UG/ML
INJECTION, SOLUTION INTRAMUSCULAR; INTRAVENOUS
Status: COMPLETED
Start: 2023-01-01 | End: 2023-01-01

## 2023-01-01 RX ORDER — ETOMIDATE 2 MG/ML
10 INJECTION INTRAVENOUS ONCE
Status: COMPLETED | OUTPATIENT
Start: 2023-01-01 | End: 2023-01-01

## 2023-01-01 RX ORDER — SODIUM CHLORIDE 450 MG/100ML
10 INJECTION, SOLUTION INTRAVENOUS CONTINUOUS
Status: DISCONTINUED | OUTPATIENT
Start: 2023-01-01 | End: 2023-01-01

## 2023-01-01 RX ORDER — DEXTROSE 50 % IN WATER (D50W) INTRAVENOUS SYRINGE
25 AS NEEDED
Status: DISCONTINUED | OUTPATIENT
Start: 2023-01-01 | End: 2023-01-01 | Stop reason: SDUPTHER

## 2023-01-01 RX ORDER — INSULIN GLARGINE 100 [IU]/ML
8 INJECTION, SOLUTION SUBCUTANEOUS DAILY
Status: DISCONTINUED | OUTPATIENT
Start: 2023-01-01 | End: 2023-01-01

## 2023-01-01 RX ORDER — ALBUMIN HUMAN 250 G/1000ML
12.5 SOLUTION INTRAVENOUS EVERY 6 HOURS
Status: DISCONTINUED | OUTPATIENT
Start: 2023-01-01 | End: 2023-01-01

## 2023-01-01 RX ORDER — SODIUM CHLORIDE 9 MG/ML
250 INJECTION, SOLUTION INTRAVENOUS AS NEEDED
Status: DISCONTINUED | OUTPATIENT
Start: 2023-01-01 | End: 2023-01-01

## 2023-01-01 RX ORDER — POTASSIUM CHLORIDE 750 MG/1
20 TABLET, FILM COATED, EXTENDED RELEASE ORAL ONCE
Status: COMPLETED | OUTPATIENT
Start: 2023-01-01 | End: 2023-01-01

## 2023-01-01 RX ORDER — INSULIN LISPRO 100 [IU]/ML
INJECTION, SOLUTION INTRAVENOUS; SUBCUTANEOUS
Status: DISCONTINUED | OUTPATIENT
Start: 2023-01-01 | End: 2023-01-01

## 2023-01-01 RX ORDER — HEPARIN SODIUM 1000 [USP'U]/ML
1600 INJECTION, SOLUTION INTRAVENOUS; SUBCUTANEOUS ONCE
Status: COMPLETED | OUTPATIENT
Start: 2023-01-01 | End: 2023-01-01

## 2023-01-01 RX ORDER — ROCURONIUM BROMIDE 10 MG/ML
INJECTION, SOLUTION INTRAVENOUS
Status: COMPLETED
Start: 2023-01-01 | End: 2023-01-01

## 2023-01-01 RX ORDER — IBUPROFEN 200 MG
4 TABLET ORAL AS NEEDED
Status: DISCONTINUED | OUTPATIENT
Start: 2023-01-01 | End: 2023-01-01

## 2023-01-01 RX ORDER — NALOXONE HYDROCHLORIDE 0.4 MG/ML
0.4 INJECTION, SOLUTION INTRAMUSCULAR; INTRAVENOUS; SUBCUTANEOUS AS NEEDED
Status: DISCONTINUED | OUTPATIENT
Start: 2023-01-01 | End: 2023-01-01 | Stop reason: HOSPADM

## 2023-01-01 RX ORDER — MAGNESIUM SULFATE HEPTAHYDRATE 40 MG/ML
2 INJECTION, SOLUTION INTRAVENOUS ONCE
Status: COMPLETED | OUTPATIENT
Start: 2023-01-01 | End: 2023-01-01

## 2023-01-01 RX ORDER — DOBUTAMINE HYDROCHLORIDE 200 MG/100ML
2 INJECTION INTRAVENOUS CONTINUOUS
Status: DISCONTINUED | OUTPATIENT
Start: 2023-01-01 | End: 2023-01-01

## 2023-01-01 RX ORDER — SODIUM CHLORIDE 9 MG/ML
250 INJECTION, SOLUTION INTRAVENOUS AS NEEDED
Status: DISCONTINUED | OUTPATIENT
Start: 2023-01-01 | End: 2023-01-01 | Stop reason: ALTCHOICE

## 2023-01-01 RX ORDER — NEOMYCIN SULFATE, POLYMYXIN B SULFATE, AND DEXAMETHASONE 3.5; 10000; 1 MG/G; [USP'U]/G; MG/G
OINTMENT OPHTHALMIC 2 TIMES DAILY
Status: DISCONTINUED | OUTPATIENT
Start: 2023-01-01 | End: 2023-01-01

## 2023-01-01 RX ORDER — HEPARIN 100 UNIT/ML
500 SYRINGE INTRAVENOUS AS NEEDED
Status: DISCONTINUED | OUTPATIENT
Start: 2023-01-01 | End: 2023-01-01

## 2023-01-01 RX ORDER — HEPARIN SODIUM 1000 [USP'U]/ML
2000 INJECTION, SOLUTION INTRAVENOUS; SUBCUTANEOUS ONCE
Status: COMPLETED | OUTPATIENT
Start: 2023-01-01 | End: 2023-01-01

## 2023-01-01 RX ORDER — POLYETHYLENE GLYCOL 3350 17 G/17G
17 POWDER, FOR SOLUTION ORAL
COMMUNITY

## 2023-01-01 RX ORDER — INSULIN GLARGINE 100 [IU]/ML
6 INJECTION, SOLUTION SUBCUTANEOUS DAILY
Status: DISCONTINUED | OUTPATIENT
Start: 2023-01-01 | End: 2023-01-01

## 2023-01-01 RX ORDER — MIDAZOLAM HYDROCHLORIDE 1 MG/ML
6 INJECTION, SOLUTION INTRAMUSCULAR; INTRAVENOUS ONCE
Status: COMPLETED | OUTPATIENT
Start: 2023-01-01 | End: 2023-01-01

## 2023-01-01 RX ORDER — HEPARIN 100 UNIT/ML
500 SYRINGE INTRAVENOUS DAILY
Status: DISCONTINUED | OUTPATIENT
Start: 2023-01-01 | End: 2023-01-01

## 2023-01-01 RX ORDER — WARFARIN 2.5 MG/1
2.5 TABLET ORAL ONCE
Status: COMPLETED | OUTPATIENT
Start: 2023-01-01 | End: 2023-01-01

## 2023-01-01 RX ORDER — SODIUM CHLORIDE 9 MG/ML
6 INJECTION, SOLUTION INTRAVENOUS CONTINUOUS
Status: DISCONTINUED | OUTPATIENT
Start: 2023-01-01 | End: 2023-01-01 | Stop reason: HOSPADM

## 2023-01-01 RX ORDER — CINACALCET 30 MG/1
60 TABLET, FILM COATED ORAL
Status: DISCONTINUED | OUTPATIENT
Start: 2023-01-01 | End: 2023-01-01

## 2023-01-01 RX ORDER — ACETAMINOPHEN 325 MG/1
650 TABLET ORAL ONCE
Status: DISCONTINUED | OUTPATIENT
Start: 2023-01-01 | End: 2023-01-01 | Stop reason: HOSPADM

## 2023-01-01 RX ORDER — AMIODARONE HYDROCHLORIDE 200 MG/1
400 TABLET ORAL EVERY 12 HOURS
Status: DISCONTINUED | OUTPATIENT
Start: 2023-01-01 | End: 2023-01-01

## 2023-01-01 RX ORDER — ALBUMIN HUMAN 50 G/1000ML
12.5 SOLUTION INTRAVENOUS ONCE
Status: COMPLETED | OUTPATIENT
Start: 2023-01-01 | End: 2023-01-01

## 2023-01-01 RX ORDER — INSULIN LISPRO 100 [IU]/ML
INJECTION, SOLUTION INTRAVENOUS; SUBCUTANEOUS EVERY 12 HOURS
Status: DISCONTINUED | OUTPATIENT
Start: 2023-01-01 | End: 2023-01-01 | Stop reason: HOSPADM

## 2023-01-01 RX ORDER — HEPARIN SODIUM 10000 [USP'U]/100ML
12-25 INJECTION, SOLUTION INTRAVENOUS
Status: DISCONTINUED | OUTPATIENT
Start: 2023-01-01 | End: 2023-01-01 | Stop reason: SDUPTHER

## 2023-01-01 RX ORDER — LANOLIN ALCOHOL/MO/W.PET/CERES
400 CREAM (GRAM) TOPICAL DAILY
Status: DISCONTINUED | OUTPATIENT
Start: 2023-01-01 | End: 2023-01-01

## 2023-01-01 RX ORDER — OXYCODONE HYDROCHLORIDE 5 MG/1
10 TABLET ORAL
Status: DISCONTINUED | OUTPATIENT
Start: 2023-01-01 | End: 2023-01-01 | Stop reason: HOSPADM

## 2023-01-01 RX ORDER — SODIUM CHLORIDE 0.9 % (FLUSH) 0.9 %
5-40 SYRINGE (ML) INJECTION AS NEEDED
Status: DISCONTINUED | OUTPATIENT
Start: 2023-01-01 | End: 2023-01-01 | Stop reason: HOSPADM

## 2023-01-01 RX ORDER — TRAZODONE HYDROCHLORIDE 50 MG/1
50 TABLET ORAL
Status: DISCONTINUED | OUTPATIENT
Start: 2023-01-01 | End: 2023-01-01

## 2023-01-01 RX ORDER — ONDANSETRON 2 MG/ML
4 INJECTION INTRAMUSCULAR; INTRAVENOUS
Status: DISCONTINUED | OUTPATIENT
Start: 2023-01-01 | End: 2023-01-01

## 2023-01-01 RX ORDER — HEPARIN SODIUM 1000 [USP'U]/ML
1632 INJECTION, SOLUTION INTRAVENOUS; SUBCUTANEOUS ONCE
Status: COMPLETED | OUTPATIENT
Start: 2023-01-01 | End: 2023-01-01

## 2023-01-01 RX ORDER — MILRINONE LACTATE 0.2 MG/ML
0.38 INJECTION, SOLUTION INTRAVENOUS CONTINUOUS
Status: DISCONTINUED | OUTPATIENT
Start: 2023-01-01 | End: 2023-01-01

## 2023-01-01 RX ORDER — PHENYLEPHRINE HCL IN 0.9% NACL 0.4MG/10ML
80 SYRINGE (ML) INTRAVENOUS ONCE
Status: COMPLETED | OUTPATIENT
Start: 2023-01-01 | End: 2023-01-01

## 2023-01-01 RX ORDER — MORPHINE SULFATE 2 MG/ML
2 INJECTION, SOLUTION INTRAMUSCULAR; INTRAVENOUS
Status: DISCONTINUED | OUTPATIENT
Start: 2023-01-01 | End: 2023-01-01

## 2023-01-01 RX ORDER — INSULIN LISPRO 100 [IU]/ML
3 INJECTION, SOLUTION INTRAVENOUS; SUBCUTANEOUS ONCE
Status: COMPLETED | OUTPATIENT
Start: 2023-01-01 | End: 2023-01-01

## 2023-01-01 RX ORDER — FACIAL-BODY WIPES
10 EACH TOPICAL DAILY PRN
Status: DISCONTINUED | OUTPATIENT
Start: 2023-01-01 | End: 2023-01-01 | Stop reason: HOSPADM

## 2023-01-01 RX ORDER — HYDROMORPHONE HYDROCHLORIDE 1 MG/ML
0.5 INJECTION, SOLUTION INTRAMUSCULAR; INTRAVENOUS; SUBCUTANEOUS
Status: DISCONTINUED | OUTPATIENT
Start: 2023-01-01 | End: 2023-01-01 | Stop reason: HOSPADM

## 2023-01-01 RX ORDER — CEFAZOLIN SODIUM 1 G/3ML
INJECTION, POWDER, FOR SOLUTION INTRAMUSCULAR; INTRAVENOUS AS NEEDED
Status: DISCONTINUED | OUTPATIENT
Start: 2023-01-01 | End: 2023-01-01 | Stop reason: HOSPADM

## 2023-01-01 RX ORDER — FUROSEMIDE 10 MG/ML
40 INJECTION INTRAMUSCULAR; INTRAVENOUS 2 TIMES DAILY
Status: DISCONTINUED | OUTPATIENT
Start: 2023-01-01 | End: 2023-01-01

## 2023-01-01 RX ORDER — IBUPROFEN 200 MG
4 TABLET ORAL AS NEEDED
Status: DISCONTINUED | OUTPATIENT
Start: 2023-01-01 | End: 2023-01-01 | Stop reason: HOSPADM

## 2023-01-01 RX ORDER — AMIODARONE HYDROCHLORIDE 200 MG/1
200 TABLET ORAL 3 TIMES DAILY
Status: DISCONTINUED | OUTPATIENT
Start: 2023-01-01 | End: 2023-01-01

## 2023-01-01 RX ORDER — WARFARIN 2 MG/1
2 TABLET ORAL ONCE
Status: DISCONTINUED | OUTPATIENT
Start: 2023-01-01 | End: 2023-01-01

## 2023-01-01 RX ORDER — IPRATROPIUM BROMIDE AND ALBUTEROL SULFATE 2.5; .5 MG/3ML; MG/3ML
3 SOLUTION RESPIRATORY (INHALATION)
Status: DISCONTINUED | OUTPATIENT
Start: 2023-01-01 | End: 2023-01-05

## 2023-01-01 RX ORDER — NEOMYCIN SULFATE, POLYMYXIN B SULFATE, BACITRACIN ZINC, HYDROCORTISONE 3.5; 10000; 400; 1 MG/G; [USP'U]/G; [USP'U]/G; MG/G
OINTMENT OPHTHALMIC 2 TIMES DAILY
Status: DISCONTINUED | OUTPATIENT
Start: 2023-01-01 | End: 2023-01-01 | Stop reason: CLARIF

## 2023-01-01 RX ORDER — HEPARIN SODIUM 200 [USP'U]/100ML
INJECTION, SOLUTION INTRAVENOUS
Status: COMPLETED | OUTPATIENT
Start: 2023-01-01 | End: 2023-01-01

## 2023-01-01 RX ORDER — ACETAMINOPHEN 325 MG/1
650 TABLET ORAL
Status: DISCONTINUED | OUTPATIENT
Start: 2023-01-01 | End: 2023-01-01 | Stop reason: HOSPADM

## 2023-01-01 RX ORDER — ALBUMIN HUMAN 250 G/1000ML
25 SOLUTION INTRAVENOUS
Status: DISCONTINUED | OUTPATIENT
Start: 2023-01-01 | End: 2023-01-01 | Stop reason: HOSPADM

## 2023-01-01 RX ORDER — LIDOCAINE 4 G/100G
1 PATCH TOPICAL EVERY 24 HOURS
Status: DISCONTINUED | OUTPATIENT
Start: 2023-01-01 | End: 2023-01-01

## 2023-01-01 RX ORDER — LEVETIRACETAM 500 MG/5ML
500 INJECTION, SOLUTION, CONCENTRATE INTRAVENOUS DAILY
Status: DISCONTINUED | OUTPATIENT
Start: 2023-01-01 | End: 2023-01-01

## 2023-01-01 RX ORDER — LIDOCAINE 4 G/100G
2 PATCH TOPICAL EVERY 24 HOURS
Status: DISCONTINUED | OUTPATIENT
Start: 2023-01-01 | End: 2023-01-01 | Stop reason: HOSPADM

## 2023-01-01 RX ORDER — FENTANYL CITRATE 50 UG/ML
200 INJECTION, SOLUTION INTRAMUSCULAR; INTRAVENOUS ONCE
Status: COMPLETED | OUTPATIENT
Start: 2023-01-01 | End: 2023-01-01

## 2023-01-01 RX ORDER — HEPARIN SODIUM 10000 [USP'U]/100ML
400 INJECTION, SOLUTION INTRAVENOUS CONTINUOUS
Status: DISCONTINUED | OUTPATIENT
Start: 2023-01-01 | End: 2023-01-01

## 2023-01-01 RX ORDER — POTASSIUM CHLORIDE 750 MG/1
20 TABLET, FILM COATED, EXTENDED RELEASE ORAL 2 TIMES DAILY
Status: DISCONTINUED | OUTPATIENT
Start: 2023-01-01 | End: 2023-01-01

## 2023-01-01 RX ORDER — ROSUVASTATIN CALCIUM 10 MG/1
20 TABLET, COATED ORAL
Status: DISCONTINUED | OUTPATIENT
Start: 2023-01-01 | End: 2023-01-01

## 2023-01-01 RX ORDER — LEVETIRACETAM 500 MG/5ML
500 INJECTION, SOLUTION, CONCENTRATE INTRAVENOUS EVERY 12 HOURS
Status: DISCONTINUED | OUTPATIENT
Start: 2023-01-01 | End: 2023-01-01 | Stop reason: HOSPADM

## 2023-01-01 RX ORDER — ONDANSETRON 2 MG/ML
4 INJECTION INTRAMUSCULAR; INTRAVENOUS
Status: DISCONTINUED | OUTPATIENT
Start: 2023-01-01 | End: 2023-01-01 | Stop reason: HOSPADM

## 2023-01-01 RX ORDER — CALCITONIN SALMON 200 [USP'U]/ML
4 INJECTION, SOLUTION INTRAMUSCULAR; SUBCUTANEOUS EVERY 12 HOURS
Status: COMPLETED | OUTPATIENT
Start: 2023-01-01 | End: 2023-01-01

## 2023-01-01 RX ORDER — SODIUM CHLORIDE 9 MG/ML
6 INJECTION, SOLUTION INTRAVENOUS CONTINUOUS
Status: DISCONTINUED | OUTPATIENT
Start: 2023-01-01 | End: 2023-01-01

## 2023-01-01 RX ORDER — ACETAMINOPHEN 650 MG/1
650 SUPPOSITORY RECTAL
Status: DISCONTINUED | OUTPATIENT
Start: 2023-01-01 | End: 2023-01-01 | Stop reason: HOSPADM

## 2023-01-01 RX ORDER — FLUCONAZOLE 2 MG/ML
200 INJECTION, SOLUTION INTRAVENOUS EVERY 24 HOURS
Status: COMPLETED | OUTPATIENT
Start: 2023-01-01 | End: 2023-01-01

## 2023-01-01 RX ORDER — BUMETANIDE 0.25 MG/ML
1 INJECTION INTRAMUSCULAR; INTRAVENOUS ONCE
Status: COMPLETED | OUTPATIENT
Start: 2023-01-01 | End: 2023-01-01

## 2023-01-01 RX ORDER — MIDAZOLAM HYDROCHLORIDE 1 MG/ML
4 INJECTION, SOLUTION INTRAMUSCULAR; INTRAVENOUS ONCE
Status: COMPLETED | OUTPATIENT
Start: 2023-01-01 | End: 2023-01-01

## 2023-01-01 RX ORDER — LEVOFLOXACIN 5 MG/ML
500 INJECTION, SOLUTION INTRAVENOUS ONCE
Status: COMPLETED | OUTPATIENT
Start: 2023-01-01 | End: 2023-01-01

## 2023-01-01 RX ORDER — MIDAZOLAM HYDROCHLORIDE 1 MG/ML
2 INJECTION, SOLUTION INTRAMUSCULAR; INTRAVENOUS ONCE
Status: COMPLETED | OUTPATIENT
Start: 2023-01-01 | End: 2023-01-01

## 2023-01-01 RX ORDER — SODIUM CHLORIDE 9 MG/ML
250 INJECTION, SOLUTION INTRAVENOUS CONTINUOUS
Status: DISCONTINUED | OUTPATIENT
Start: 2023-01-01 | End: 2023-01-01

## 2023-01-01 RX ORDER — HEPARIN SODIUM 1000 [USP'U]/ML
60 INJECTION, SOLUTION INTRAVENOUS; SUBCUTANEOUS ONCE
Status: COMPLETED | OUTPATIENT
Start: 2023-01-01 | End: 2023-01-01

## 2023-01-01 RX ORDER — HEPARIN SOD,PORCINE/0.9 % NACL 30K/1000ML
50-1000 INTRAVENOUS SOLUTION INTRAVENOUS AS NEEDED
Status: DISCONTINUED | OUTPATIENT
Start: 2023-01-01 | End: 2023-01-01 | Stop reason: HOSPADM

## 2023-01-01 RX ORDER — SODIUM CHLORIDE, SODIUM LACTATE, POTASSIUM CHLORIDE, CALCIUM CHLORIDE 600; 310; 30; 20 MG/100ML; MG/100ML; MG/100ML; MG/100ML
50 INJECTION, SOLUTION INTRAVENOUS CONTINUOUS
Status: DISCONTINUED | OUTPATIENT
Start: 2023-01-01 | End: 2023-01-01 | Stop reason: HOSPADM

## 2023-01-01 RX ORDER — KETOROLAC TROMETHAMINE 30 MG/ML
15 INJECTION, SOLUTION INTRAMUSCULAR; INTRAVENOUS EVERY 6 HOURS
Status: DISCONTINUED | OUTPATIENT
Start: 2023-01-01 | End: 2023-01-01

## 2023-01-01 RX ORDER — SODIUM CHLORIDE 0.9 % (FLUSH) 0.9 %
5-40 SYRINGE (ML) INJECTION AS NEEDED
Status: DISCONTINUED | OUTPATIENT
Start: 2023-01-01 | End: 2023-01-01

## 2023-01-01 RX ORDER — INSULIN GLARGINE 100 [IU]/ML
20 INJECTION, SOLUTION SUBCUTANEOUS DAILY
Status: DISCONTINUED | OUTPATIENT
Start: 2023-01-01 | End: 2023-01-01

## 2023-01-01 RX ORDER — FUROSEMIDE 10 MG/ML
60 INJECTION INTRAMUSCULAR; INTRAVENOUS ONCE
Status: DISCONTINUED | OUTPATIENT
Start: 2023-01-01 | End: 2023-01-01

## 2023-01-01 RX ORDER — GABAPENTIN 100 MG/1
200 CAPSULE ORAL 2 TIMES DAILY
Status: DISCONTINUED | OUTPATIENT
Start: 2023-01-01 | End: 2023-01-01

## 2023-01-01 RX ORDER — BUMETANIDE 0.25 MG/ML
1 INJECTION INTRAMUSCULAR; INTRAVENOUS
Status: DISCONTINUED | OUTPATIENT
Start: 2023-01-01 | End: 2023-01-01

## 2023-01-01 RX ORDER — BALSAM PERU/CASTOR OIL
OINTMENT (GRAM) TOPICAL 2 TIMES DAILY
Status: DISCONTINUED | OUTPATIENT
Start: 2023-01-01 | End: 2023-01-01 | Stop reason: HOSPADM

## 2023-01-01 RX ORDER — METRONIDAZOLE 500 MG/100ML
500 INJECTION, SOLUTION INTRAVENOUS EVERY 12 HOURS
Status: DISCONTINUED | OUTPATIENT
Start: 2023-01-01 | End: 2023-01-01

## 2023-01-01 RX ORDER — ALBUMIN HUMAN 50 G/1000ML
12.5 SOLUTION INTRAVENOUS
Status: COMPLETED | OUTPATIENT
Start: 2023-01-01 | End: 2023-01-01

## 2023-01-01 RX ORDER — SODIUM CHLORIDE 0.9 % (FLUSH) 0.9 %
5-40 SYRINGE (ML) INJECTION EVERY 8 HOURS
Status: DISCONTINUED | OUTPATIENT
Start: 2023-01-01 | End: 2023-01-01

## 2023-01-01 RX ORDER — ALBUMIN HUMAN 250 G/1000ML
12.5 SOLUTION INTRAVENOUS EVERY 12 HOURS
Status: DISCONTINUED | OUTPATIENT
Start: 2023-01-01 | End: 2023-01-01

## 2023-01-01 RX ORDER — IPRATROPIUM BROMIDE AND ALBUTEROL SULFATE 2.5; .5 MG/3ML; MG/3ML
3 SOLUTION RESPIRATORY (INHALATION)
Status: DISCONTINUED | OUTPATIENT
Start: 2023-01-01 | End: 2023-01-01 | Stop reason: HOSPADM

## 2023-01-01 RX ORDER — ROCURONIUM BROMIDE 10 MG/ML
100 INJECTION, SOLUTION INTRAVENOUS
Status: COMPLETED | OUTPATIENT
Start: 2023-01-01 | End: 2023-01-01

## 2023-01-01 RX ORDER — MIDAZOLAM HYDROCHLORIDE 1 MG/ML
1 INJECTION, SOLUTION INTRAMUSCULAR; INTRAVENOUS ONCE
Status: COMPLETED | OUTPATIENT
Start: 2023-01-01 | End: 2023-01-01

## 2023-01-01 RX ORDER — SODIUM BICARBONATE 1 MEQ/ML
100 SYRINGE (ML) INTRAVENOUS ONCE
Status: COMPLETED | OUTPATIENT
Start: 2023-01-01 | End: 2023-01-01

## 2023-01-01 RX ORDER — MILRINONE LACTATE 0.2 MG/ML
INJECTION, SOLUTION INTRAVENOUS
Status: COMPLETED
Start: 2023-01-01 | End: 2023-01-01

## 2023-01-01 RX ORDER — CINACALCET 30 MG/1
90 TABLET, FILM COATED ORAL 2 TIMES DAILY WITH MEALS
Status: DISCONTINUED | OUTPATIENT
Start: 2023-01-01 | End: 2023-01-01

## 2023-01-01 RX ORDER — SODIUM CHLORIDE 9 MG/ML
9 INJECTION, SOLUTION INTRAVENOUS CONTINUOUS
Status: DISCONTINUED | OUTPATIENT
Start: 2023-01-01 | End: 2023-01-01

## 2023-01-01 RX ORDER — ALBUMIN HUMAN 50 G/1000ML
SOLUTION INTRAVENOUS
Status: COMPLETED
Start: 2023-01-01 | End: 2023-01-01

## 2023-01-01 RX ORDER — CINACALCET 30 MG/1
60 TABLET, FILM COATED ORAL 2 TIMES DAILY WITH MEALS
Status: DISCONTINUED | OUTPATIENT
Start: 2023-01-01 | End: 2023-01-01

## 2023-01-01 RX ORDER — ACETAMINOPHEN 650 MG/1
650 SUPPOSITORY RECTAL
Status: DISCONTINUED | OUTPATIENT
Start: 2023-01-01 | End: 2023-01-01

## 2023-01-01 RX ORDER — INSULIN LISPRO 100 [IU]/ML
6 INJECTION, SOLUTION INTRAVENOUS; SUBCUTANEOUS
Status: DISCONTINUED | OUTPATIENT
Start: 2023-01-01 | End: 2023-01-01

## 2023-01-01 RX ORDER — METOPROLOL SUCCINATE 25 MG/1
12.5 TABLET, EXTENDED RELEASE ORAL EVERY 12 HOURS
Status: DISCONTINUED | OUTPATIENT
Start: 2023-01-01 | End: 2023-01-01

## 2023-01-01 RX ORDER — FENTANYL CITRATE 50 UG/ML
50 INJECTION, SOLUTION INTRAMUSCULAR; INTRAVENOUS ONCE
Status: COMPLETED | OUTPATIENT
Start: 2023-01-01 | End: 2023-01-01

## 2023-01-01 RX ORDER — GUAIFENESIN 100 MG/5ML
81 LIQUID (ML) ORAL DAILY
Status: DISCONTINUED | OUTPATIENT
Start: 2023-01-01 | End: 2023-01-01

## 2023-01-01 RX ORDER — DEXTROSE 50 % IN WATER (D50W) INTRAVENOUS SYRINGE
25 AS NEEDED
Status: DISPENSED | OUTPATIENT
Start: 2023-01-01 | End: 2023-01-01

## 2023-01-01 RX ORDER — AMIODARONE HYDROCHLORIDE 200 MG/1
400 TABLET ORAL 2 TIMES DAILY
Status: DISCONTINUED | OUTPATIENT
Start: 2023-01-01 | End: 2023-01-01

## 2023-01-01 RX ORDER — INSULIN LISPRO 100 [IU]/ML
8 INJECTION, SOLUTION INTRAVENOUS; SUBCUTANEOUS ONCE
Status: COMPLETED | OUTPATIENT
Start: 2023-01-01 | End: 2023-01-01

## 2023-01-01 RX ORDER — NOREPINEPHRINE BITARTRATE/D5W 8 MG/250ML
.5-16 PLASTIC BAG, INJECTION (ML) INTRAVENOUS
Status: DISCONTINUED | OUTPATIENT
Start: 2023-01-01 | End: 2023-01-01 | Stop reason: HOSPADM

## 2023-01-01 RX ORDER — HYDRALAZINE HYDROCHLORIDE 10 MG/1
5 TABLET, FILM COATED ORAL AS NEEDED
Status: DISCONTINUED | OUTPATIENT
Start: 2023-01-01 | End: 2023-01-01 | Stop reason: HOSPADM

## 2023-01-01 RX ORDER — OXYCODONE HYDROCHLORIDE 5 MG/1
10 TABLET ORAL
Status: DISCONTINUED | OUTPATIENT
Start: 2023-01-01 | End: 2023-01-01 | Stop reason: SDUPTHER

## 2023-01-01 RX ORDER — MILRINONE LACTATE 0.2 MG/ML
INJECTION, SOLUTION INTRAVENOUS
Status: DISPENSED
Start: 2023-01-01 | End: 2023-01-01

## 2023-01-01 RX ORDER — NITROGLYCERIN 20 MG/100ML
0-20 INJECTION INTRAVENOUS
Status: DISCONTINUED | OUTPATIENT
Start: 2023-01-01 | End: 2023-01-01

## 2023-01-01 RX ORDER — VANCOMYCIN 1.75 GRAM/500 ML IN 0.9 % SODIUM CHLORIDE INTRAVENOUS
1750 ONCE
Status: COMPLETED | OUTPATIENT
Start: 2023-01-01 | End: 2023-01-01

## 2023-01-01 RX ORDER — BALSAM PERU/CASTOR OIL
OINTMENT (GRAM) TOPICAL 2 TIMES DAILY
Status: DISCONTINUED | OUTPATIENT
Start: 2023-01-01 | End: 2023-01-01

## 2023-01-01 RX ORDER — LIDOCAINE HYDROCHLORIDE 10 MG/ML
0.1 INJECTION, SOLUTION EPIDURAL; INFILTRATION; INTRACAUDAL; PERINEURAL AS NEEDED
Status: DISCONTINUED | OUTPATIENT
Start: 2023-01-01 | End: 2023-01-01 | Stop reason: HOSPADM

## 2023-01-01 RX ORDER — ACETAMINOPHEN 325 MG/1
650 TABLET ORAL
Status: DISCONTINUED | OUTPATIENT
Start: 2023-01-01 | End: 2023-01-01

## 2023-01-01 RX ORDER — ASPIRIN 81 MG/1
81 TABLET ORAL DAILY
Status: DISCONTINUED | OUTPATIENT
Start: 2023-01-01 | End: 2023-01-01

## 2023-01-01 RX ORDER — POLYETHYLENE GLYCOL 3350 17 G/17G
17 POWDER, FOR SOLUTION ORAL DAILY PRN
Status: DISCONTINUED | OUTPATIENT
Start: 2023-01-01 | End: 2023-01-01

## 2023-01-01 RX ORDER — CHLORHEXIDINE GLUCONATE 1.2 MG/ML
15 RINSE ORAL EVERY 12 HOURS
Status: DISCONTINUED | OUTPATIENT
Start: 2023-01-01 | End: 2023-01-01 | Stop reason: HOSPADM

## 2023-01-01 RX ORDER — HYDROMORPHONE HYDROCHLORIDE 1 MG/ML
0.2 INJECTION, SOLUTION INTRAMUSCULAR; INTRAVENOUS; SUBCUTANEOUS
Status: DISCONTINUED | OUTPATIENT
Start: 2023-01-01 | End: 2023-01-01 | Stop reason: HOSPADM

## 2023-01-01 RX ORDER — TRAZODONE HYDROCHLORIDE 50 MG/1
25 TABLET ORAL
Status: DISCONTINUED | OUTPATIENT
Start: 2023-01-01 | End: 2023-01-01

## 2023-01-01 RX ORDER — INSULIN LISPRO 100 [IU]/ML
8 INJECTION, SOLUTION INTRAVENOUS; SUBCUTANEOUS
Status: DISCONTINUED | OUTPATIENT
Start: 2023-01-01 | End: 2023-01-01

## 2023-01-01 RX ORDER — ATORVASTATIN CALCIUM 10 MG/1
10 TABLET, FILM COATED ORAL EVERY EVENING
Status: DISCONTINUED | OUTPATIENT
Start: 2023-01-01 | End: 2023-01-01

## 2023-01-01 RX ORDER — DIGOXIN 125 MCG
0.12 TABLET ORAL EVERY OTHER DAY
Status: DISCONTINUED | OUTPATIENT
Start: 2023-01-01 | End: 2023-01-01

## 2023-01-01 RX ORDER — BUMETANIDE 1 MG/1
1 TABLET ORAL 2 TIMES DAILY
Status: CANCELLED | OUTPATIENT
Start: 2023-01-01

## 2023-01-01 RX ORDER — ETOMIDATE 2 MG/ML
INJECTION INTRAVENOUS
Status: COMPLETED
Start: 2023-01-01 | End: 2023-01-01

## 2023-01-01 RX ORDER — PHENYLEPHRINE HCL IN 0.9% NACL 0.4MG/10ML
SYRINGE (ML) INTRAVENOUS
Status: COMPLETED
Start: 2023-01-01 | End: 2023-01-01

## 2023-01-01 RX ORDER — PHENYLEPHRINE HCL IN 0.9% NACL 0.4MG/10ML
100-400 SYRINGE (ML) INTRAVENOUS ONCE
Status: COMPLETED | OUTPATIENT
Start: 2023-01-01 | End: 2023-01-01

## 2023-01-01 RX ORDER — ONDANSETRON 4 MG/1
4 TABLET, ORALLY DISINTEGRATING ORAL
Status: DISCONTINUED | OUTPATIENT
Start: 2023-01-01 | End: 2023-01-01

## 2023-01-01 RX ORDER — FENTANYL CITRATE 50 UG/ML
300 INJECTION, SOLUTION INTRAMUSCULAR; INTRAVENOUS ONCE
Status: COMPLETED | OUTPATIENT
Start: 2023-01-01 | End: 2023-01-01

## 2023-01-01 RX ORDER — INSULIN GLARGINE 100 [IU]/ML
1-50 INJECTION, SOLUTION SUBCUTANEOUS
Status: ACTIVE | OUTPATIENT
Start: 2023-01-01 | End: 2023-01-01

## 2023-01-01 RX ORDER — NOREPINEPHRINE BITARTRATE/D5W 8 MG/250ML
.5-3 PLASTIC BAG, INJECTION (ML) INTRAVENOUS
Status: CANCELLED | OUTPATIENT
Start: 2023-01-01

## 2023-01-01 RX ORDER — MUPIROCIN 20 MG/G
1 OINTMENT TOPICAL 2 TIMES DAILY
Status: DISCONTINUED | OUTPATIENT
Start: 2023-01-01 | End: 2023-01-01 | Stop reason: HOSPADM

## 2023-01-01 RX ORDER — FENTANYL CITRATE 50 UG/ML
25 INJECTION, SOLUTION INTRAMUSCULAR; INTRAVENOUS
Status: DISCONTINUED | OUTPATIENT
Start: 2023-01-01 | End: 2023-01-01 | Stop reason: HOSPADM

## 2023-01-01 RX ORDER — POLYETHYLENE GLYCOL 3350 17 G/17G
17 POWDER, FOR SOLUTION ORAL DAILY
Status: DISCONTINUED | OUTPATIENT
Start: 2023-01-01 | End: 2023-01-01

## 2023-01-01 RX ORDER — LORAZEPAM 0.5 MG/1
1 TABLET ORAL ONCE
Status: COMPLETED | OUTPATIENT
Start: 2023-01-01 | End: 2023-01-01

## 2023-01-01 RX ORDER — WARFARIN 1 MG/1
1 TABLET ORAL
Status: COMPLETED | OUTPATIENT
Start: 2023-01-01 | End: 2023-01-01

## 2023-01-01 RX ORDER — PROTAMINE SULFATE 10 MG/ML
500 INJECTION, SOLUTION INTRAVENOUS ONCE
Status: COMPLETED | OUTPATIENT
Start: 2023-01-01 | End: 2023-01-01

## 2023-01-01 RX ORDER — FENTANYL CITRATE 50 UG/ML
50 INJECTION, SOLUTION INTRAMUSCULAR; INTRAVENOUS AS NEEDED
Status: COMPLETED | OUTPATIENT
Start: 2023-01-01 | End: 2023-01-01

## 2023-01-01 RX ORDER — SODIUM BICARBONATE 1 MEQ/ML
SYRINGE (ML) INTRAVENOUS
Status: COMPLETED
Start: 2023-01-01 | End: 2023-01-01

## 2023-01-01 RX ORDER — FLUCONAZOLE 2 MG/ML
200 INJECTION, SOLUTION INTRAVENOUS EVERY 24 HOURS
Status: DISCONTINUED | OUTPATIENT
Start: 2023-01-01 | End: 2023-01-01

## 2023-01-01 RX ORDER — AMOXICILLIN 250 MG
1 CAPSULE ORAL DAILY PRN
Status: DISCONTINUED | OUTPATIENT
Start: 2023-01-01 | End: 2023-01-01

## 2023-01-01 RX ORDER — SUCRALFATE 1 G/1
1 TABLET ORAL
Status: DISCONTINUED | OUTPATIENT
Start: 2023-01-01 | End: 2023-01-01

## 2023-01-01 RX ORDER — MUPIROCIN 20 MG/G
OINTMENT TOPICAL 2 TIMES DAILY
Status: ACTIVE | OUTPATIENT
Start: 2023-01-01 | End: 2023-01-01

## 2023-01-01 RX ORDER — ZOLPIDEM TARTRATE 5 MG/1
5 TABLET ORAL
Status: DISCONTINUED | OUTPATIENT
Start: 2023-01-01 | End: 2023-01-01

## 2023-01-01 RX ORDER — LEVOFLOXACIN 5 MG/ML
500 INJECTION, SOLUTION INTRAVENOUS
Status: COMPLETED | OUTPATIENT
Start: 2023-01-01 | End: 2023-01-01

## 2023-01-01 RX ADMIN — CALCIUM CHLORIDE, MAGNESIUM CHLORIDE, DEXTROSE MONOHYDRATE, LACTIC ACID, SODIUM CHLORIDE, SODIUM BICARBONATE AND POTASSIUM CHLORIDE: 3.68; 3.05; 22; 5.4; 6.46; 3.09; .314 INJECTION INTRAVENOUS at 01:49

## 2023-01-01 RX ADMIN — CALCIUM CHLORIDE, MAGNESIUM CHLORIDE, DEXTROSE MONOHYDRATE, LACTIC ACID, SODIUM CHLORIDE, SODIUM BICARBONATE AND POTASSIUM CHLORIDE: 3.68; 3.05; 22; 5.4; 6.46; 3.09; .314 INJECTION INTRAVENOUS at 05:07

## 2023-01-01 RX ADMIN — CALCIUM CHLORIDE, MAGNESIUM CHLORIDE, DEXTROSE MONOHYDRATE, LACTIC ACID, SODIUM CHLORIDE, SODIUM BICARBONATE AND POTASSIUM CHLORIDE: 5.15; 2.03; 22; 5.4; 6.46; 3.09; .157 INJECTION INTRAVENOUS at 17:47

## 2023-01-01 RX ADMIN — SODIUM CHLORIDE, PRESERVATIVE FREE 10 ML: 5 INJECTION INTRAVENOUS at 21:07

## 2023-01-01 RX ADMIN — APIXABAN 5 MG: 5 TABLET, FILM COATED ORAL at 08:35

## 2023-01-01 RX ADMIN — METRONIDAZOLE 500 MG: 500 INJECTION, SOLUTION INTRAVENOUS at 21:06

## 2023-01-01 RX ADMIN — Medication 4 UNITS: at 12:45

## 2023-01-01 RX ADMIN — Medication: at 10:39

## 2023-01-01 RX ADMIN — CALCIUM CHLORIDE, MAGNESIUM CHLORIDE, DEXTROSE MONOHYDRATE, LACTIC ACID, SODIUM CHLORIDE, SODIUM BICARBONATE AND POTASSIUM CHLORIDE: 3.68; 3.05; 22; 5.4; 6.46; 3.09; .314 INJECTION INTRAVENOUS at 04:19

## 2023-01-01 RX ADMIN — MILRINONE LACTATE IN DEXTROSE 0.2 MCG/KG/MIN: 200 INJECTION, SOLUTION INTRAVENOUS at 20:59

## 2023-01-01 RX ADMIN — MIDAZOLAM 4 MG: 1 INJECTION INTRAMUSCULAR; INTRAVENOUS at 15:23

## 2023-01-01 RX ADMIN — ALBUMIN (HUMAN) 12.5 G: 0.25 INJECTION, SOLUTION INTRAVENOUS at 17:10

## 2023-01-01 RX ADMIN — AMIODARONE HYDROCHLORIDE 400 MG: 200 TABLET ORAL at 18:10

## 2023-01-01 RX ADMIN — HEPARIN SODIUM 5000 UNITS: 5000 INJECTION INTRAVENOUS; SUBCUTANEOUS at 21:14

## 2023-01-01 RX ADMIN — SUCRALFATE 1 G: 1 TABLET ORAL at 07:30

## 2023-01-01 RX ADMIN — SODIUM CHLORIDE, PRESERVATIVE FREE 10 ML: 5 INJECTION INTRAVENOUS at 08:35

## 2023-01-01 RX ADMIN — SODIUM CHLORIDE, PRESERVATIVE FREE 10 ML: 5 INJECTION INTRAVENOUS at 21:24

## 2023-01-01 RX ADMIN — Medication: at 17:35

## 2023-01-01 RX ADMIN — NEOMYCIN SULFATE, POLYMYXIN B SULFATE, AND DEXAMETHASONE: 3.5; 10000; 1 OINTMENT OPHTHALMIC at 09:32

## 2023-01-01 RX ADMIN — POLYETHYLENE GLYCOL 3350 17 G: 17 POWDER, FOR SOLUTION ORAL at 13:59

## 2023-01-01 RX ADMIN — METOPROLOL SUCCINATE 12.5 MG: 25 TABLET, EXTENDED RELEASE ORAL at 22:09

## 2023-01-01 RX ADMIN — SODIUM CHLORIDE 11 ML/HR: 9 INJECTION, SOLUTION INTRAVENOUS at 12:24

## 2023-01-01 RX ADMIN — HEPARIN SODIUM 1500 UNITS: 1000 INJECTION INTRAVENOUS; SUBCUTANEOUS at 18:52

## 2023-01-01 RX ADMIN — SODIUM CHLORIDE 14 ML/HR: 9 INJECTION, SOLUTION INTRAVENOUS at 01:54

## 2023-01-01 RX ADMIN — Medication 6 UNITS: at 08:34

## 2023-01-01 RX ADMIN — MILRINONE LACTATE IN DEXTROSE 0.38 MCG/KG/MIN: 200 INJECTION, SOLUTION INTRAVENOUS at 20:53

## 2023-01-01 RX ADMIN — NEOMYCIN SULFATE, POLYMYXIN B SULFATE, AND DEXAMETHASONE: 3.5; 10000; 1 OINTMENT OPHTHALMIC at 08:23

## 2023-01-01 RX ADMIN — Medication 16 UNITS: at 08:40

## 2023-01-01 RX ADMIN — Medication: at 09:23

## 2023-01-01 RX ADMIN — CALCIUM CHLORIDE, MAGNESIUM CHLORIDE, DEXTROSE MONOHYDRATE, LACTIC ACID, SODIUM CHLORIDE, SODIUM BICARBONATE AND POTASSIUM CHLORIDE 1600 ML/HR: 3.68; 3.05; 22; 5.4; 6.46; 3.09; .314 INJECTION INTRAVENOUS at 09:00

## 2023-01-01 RX ADMIN — Medication: at 08:15

## 2023-01-01 RX ADMIN — PANTOPRAZOLE SODIUM 40 MG: 40 TABLET, DELAYED RELEASE ORAL at 07:15

## 2023-01-01 RX ADMIN — Medication 5 UNITS: at 21:14

## 2023-01-01 RX ADMIN — Medication: at 18:46

## 2023-01-01 RX ADMIN — ALBUMIN (HUMAN) 12.5 G: 0.25 INJECTION, SOLUTION INTRAVENOUS at 19:15

## 2023-01-01 RX ADMIN — TRAZODONE HYDROCHLORIDE 50 MG: 50 TABLET ORAL at 20:51

## 2023-01-01 RX ADMIN — ROSUVASTATIN 20 MG: 10 TABLET, FILM COATED ORAL at 20:18

## 2023-01-01 RX ADMIN — TRAZODONE HYDROCHLORIDE 50 MG: 50 TABLET ORAL at 22:37

## 2023-01-01 RX ADMIN — DEXMEDETOMIDINE HYDROCHLORIDE 0.7 MCG/KG/HR: 4 INJECTION, SOLUTION INTRAVENOUS at 18:14

## 2023-01-01 RX ADMIN — PHYTONADIONE 10 MG: 10 INJECTION, EMULSION INTRAMUSCULAR; INTRAVENOUS; SUBCUTANEOUS at 12:29

## 2023-01-01 RX ADMIN — DEXMEDETOMIDINE HYDROCHLORIDE 1.5 MCG/KG/HR: 4 INJECTION, SOLUTION INTRAVENOUS at 17:54

## 2023-01-01 RX ADMIN — Medication 8 UNITS: at 09:25

## 2023-01-01 RX ADMIN — COLCHICINE 0.6 MG: 0.6 TABLET, FILM COATED ORAL at 08:45

## 2023-01-01 RX ADMIN — Medication 3 UNITS: at 21:37

## 2023-01-01 RX ADMIN — ALBUMIN (HUMAN) 12.5 G: 0.25 INJECTION, SOLUTION INTRAVENOUS at 05:43

## 2023-01-01 RX ADMIN — Medication 10 UNITS: at 22:45

## 2023-01-01 RX ADMIN — HEPARIN SODIUM 1700 UNITS: 1000 INJECTION INTRAVENOUS; SUBCUTANEOUS at 00:40

## 2023-01-01 RX ADMIN — Medication 8 UNITS: at 19:23

## 2023-01-01 RX ADMIN — ALBUMIN (HUMAN) 12.5 G: 0.25 INJECTION, SOLUTION INTRAVENOUS at 17:46

## 2023-01-01 RX ADMIN — VANCOMYCIN HYDROCHLORIDE 750 MG: 750 INJECTION, POWDER, LYOPHILIZED, FOR SOLUTION INTRAVENOUS at 14:43

## 2023-01-01 RX ADMIN — Medication: at 18:59

## 2023-01-01 RX ADMIN — INSULIN GLARGINE 10 UNITS: 100 INJECTION, SOLUTION SUBCUTANEOUS at 17:45

## 2023-01-01 RX ADMIN — ALBUMIN (HUMAN) 12.5 G: 0.25 INJECTION, SOLUTION INTRAVENOUS at 17:17

## 2023-01-01 RX ADMIN — SODIUM CHLORIDE, PRESERVATIVE FREE 10 ML: 5 INJECTION INTRAVENOUS at 06:13

## 2023-01-01 RX ADMIN — PIPERACILLIN AND TAZOBACTAM 3.38 G: 3; .375 INJECTION, POWDER, FOR SOLUTION INTRAVENOUS at 20:46

## 2023-01-01 RX ADMIN — POLYETHYLENE GLYCOL 3350 17 G: 17 POWDER, FOR SOLUTION ORAL at 08:34

## 2023-01-01 RX ADMIN — HEPARIN SODIUM 1500 UNITS: 1000 INJECTION INTRAVENOUS; SUBCUTANEOUS at 20:52

## 2023-01-01 RX ADMIN — ALBUMIN (HUMAN) 12.5 G: 0.25 INJECTION, SOLUTION INTRAVENOUS at 21:25

## 2023-01-01 RX ADMIN — BUMETANIDE 1 MG: 1 TABLET ORAL at 08:42

## 2023-01-01 RX ADMIN — LABETALOL HYDROCHLORIDE 5 MG: 5 INJECTION INTRAVENOUS at 03:07

## 2023-01-01 RX ADMIN — AMIODARONE HYDROCHLORIDE 400 MG: 200 TABLET ORAL at 19:58

## 2023-01-01 RX ADMIN — SODIUM CHLORIDE 10 ML/HR: 4.5 INJECTION, SOLUTION INTRAVENOUS at 08:35

## 2023-01-01 RX ADMIN — Medication: at 10:45

## 2023-01-01 RX ADMIN — PROPOFOL 35 MCG/KG/MIN: 10 INJECTION, EMULSION INTRAVENOUS at 23:54

## 2023-01-01 RX ADMIN — MAGNESIUM OXIDE 400 MG (241.3 MG MAGNESIUM) TABLET 400 MG: TABLET at 08:52

## 2023-01-01 RX ADMIN — METRONIDAZOLE 500 MG: 500 INJECTION, SOLUTION INTRAVENOUS at 21:28

## 2023-01-01 RX ADMIN — FENTANYL CITRATE 100 MCG/HR: 0.05 INJECTION, SOLUTION INTRAMUSCULAR; INTRAVENOUS at 01:38

## 2023-01-01 RX ADMIN — Medication 7 UNITS: at 17:24

## 2023-01-01 RX ADMIN — ROSUVASTATIN 20 MG: 10 TABLET, FILM COATED ORAL at 22:05

## 2023-01-01 RX ADMIN — DOBUTAMINE HYDROCHLORIDE 2 MCG/KG/MIN: 200 INJECTION INTRAVENOUS at 04:40

## 2023-01-01 RX ADMIN — ROCURONIUM BROMIDE 50 MG: 10 INJECTION, SOLUTION INTRAVENOUS at 13:47

## 2023-01-01 RX ADMIN — HEPARIN SODIUM 2000 UNITS: 1000 INJECTION INTRAVENOUS; SUBCUTANEOUS at 08:50

## 2023-01-01 RX ADMIN — SODIUM CHLORIDE, PRESERVATIVE FREE 10 ML: 5 INJECTION INTRAVENOUS at 21:20

## 2023-01-01 RX ADMIN — Medication 4 UNITS: at 20:18

## 2023-01-01 RX ADMIN — ASPIRIN 81 MG: 81 TABLET, COATED ORAL at 08:35

## 2023-01-01 RX ADMIN — Medication: at 08:23

## 2023-01-01 RX ADMIN — CALCIUM CHLORIDE, MAGNESIUM CHLORIDE, DEXTROSE MONOHYDRATE, LACTIC ACID, SODIUM CHLORIDE, SODIUM BICARBONATE AND POTASSIUM CHLORIDE: 3.68; 3.05; 22; 5.4; 6.46; 3.09; .314 INJECTION INTRAVENOUS at 04:58

## 2023-01-01 RX ADMIN — CALCIUM CHLORIDE, MAGNESIUM CHLORIDE, DEXTROSE MONOHYDRATE, LACTIC ACID, SODIUM CHLORIDE, SODIUM BICARBONATE AND POTASSIUM CHLORIDE: 3.68; 3.05; 22; 5.4; 6.46; 3.09; .314 INJECTION INTRAVENOUS at 01:18

## 2023-01-01 RX ADMIN — POLYETHYLENE GLYCOL 3350 17 G: 17 POWDER, FOR SOLUTION ORAL at 15:39

## 2023-01-01 RX ADMIN — MAGNESIUM OXIDE 400 MG (241.3 MG MAGNESIUM) TABLET 400 MG: TABLET at 08:31

## 2023-01-01 RX ADMIN — Medication: at 08:42

## 2023-01-01 RX ADMIN — HYDROMORPHONE HYDROCHLORIDE 0.5 MG: 1 INJECTION, SOLUTION INTRAMUSCULAR; INTRAVENOUS; SUBCUTANEOUS at 03:25

## 2023-01-01 RX ADMIN — PIPERACILLIN AND TAZOBACTAM 3.38 G: 3; .375 INJECTION, POWDER, FOR SOLUTION INTRAVENOUS at 05:58

## 2023-01-01 RX ADMIN — FENTANYL CITRATE 200 MCG/HR: 0.05 INJECTION, SOLUTION INTRAMUSCULAR; INTRAVENOUS at 03:57

## 2023-01-01 RX ADMIN — DEXMEDETOMIDINE HYDROCHLORIDE 1.5 MCG/KG/HR: 4 INJECTION, SOLUTION INTRAVENOUS at 17:58

## 2023-01-01 RX ADMIN — CALCIUM CHLORIDE, MAGNESIUM CHLORIDE, DEXTROSE MONOHYDRATE, LACTIC ACID, SODIUM CHLORIDE, SODIUM BICARBONATE AND POTASSIUM CHLORIDE: 3.68; 3.05; 22; 5.4; 6.46; 3.09; .314 INJECTION INTRAVENOUS at 07:54

## 2023-01-01 RX ADMIN — OXYCODONE HYDROCHLORIDE 5 MG: 5 TABLET ORAL at 21:12

## 2023-01-01 RX ADMIN — SODIUM CHLORIDE 25 ML/HR: 4.5 INJECTION, SOLUTION INTRAVENOUS at 16:00

## 2023-01-01 RX ADMIN — ALBUMIN (HUMAN) 25 G: 0.25 INJECTION, SOLUTION INTRAVENOUS at 18:24

## 2023-01-01 RX ADMIN — COLCHICINE 0.6 MG: 0.6 TABLET, FILM COATED ORAL at 08:29

## 2023-01-01 RX ADMIN — Medication: at 23:04

## 2023-01-01 RX ADMIN — CALCITONIN SALMON 256 INT'L UNITS: 200 INJECTION, SOLUTION INTRAMUSCULAR; SUBCUTANEOUS at 13:02

## 2023-01-01 RX ADMIN — FUROSEMIDE 40 MG: 10 INJECTION, SOLUTION INTRAMUSCULAR; INTRAVENOUS at 21:19

## 2023-01-01 RX ADMIN — Medication 8 UNITS: at 17:28

## 2023-01-01 RX ADMIN — CINACALCET HYDROCHLORIDE 30 MG: 30 TABLET, FILM COATED ORAL at 13:01

## 2023-01-01 RX ADMIN — HEPARIN SODIUM 1700 UNITS: 1000 INJECTION INTRAVENOUS; SUBCUTANEOUS at 06:45

## 2023-01-01 RX ADMIN — MEROPENEM 1 G: 1 INJECTION, POWDER, FOR SOLUTION INTRAVENOUS at 05:42

## 2023-01-01 RX ADMIN — CALCIUM CHLORIDE, MAGNESIUM CHLORIDE, DEXTROSE MONOHYDRATE, LACTIC ACID, SODIUM CHLORIDE, SODIUM BICARBONATE AND POTASSIUM CHLORIDE: 3.68; 3.05; 22; 5.4; 6.46; 3.09; .314 INJECTION INTRAVENOUS at 08:02

## 2023-01-01 RX ADMIN — Medication 22 UNITS: at 19:04

## 2023-01-01 RX ADMIN — DEXMEDETOMIDINE HYDROCHLORIDE 1.2 MCG/KG/HR: 4 INJECTION, SOLUTION INTRAVENOUS at 10:05

## 2023-01-01 RX ADMIN — ALBUMIN (HUMAN) 12.5 G: 0.25 INJECTION, SOLUTION INTRAVENOUS at 18:24

## 2023-01-01 RX ADMIN — HEPARIN SODIUM 12 UNITS/KG/HR: 10000 INJECTION, SOLUTION INTRAVENOUS at 17:15

## 2023-01-01 RX ADMIN — BUMETANIDE 1 MG: 1 TABLET ORAL at 11:24

## 2023-01-01 RX ADMIN — VANCOMYCIN HYDROCHLORIDE 750 MG: 750 INJECTION, POWDER, LYOPHILIZED, FOR SOLUTION INTRAVENOUS at 15:12

## 2023-01-01 RX ADMIN — SENNOSIDES AND DOCUSATE SODIUM 1 TABLET: 50; 8.6 TABLET ORAL at 13:59

## 2023-01-01 RX ADMIN — ALBUMIN (HUMAN) 12.5 G: 0.25 INJECTION, SOLUTION INTRAVENOUS at 12:20

## 2023-01-01 RX ADMIN — HYDROMORPHONE HYDROCHLORIDE 0.5 MG: 1 INJECTION, SOLUTION INTRAMUSCULAR; INTRAVENOUS; SUBCUTANEOUS at 22:25

## 2023-01-01 RX ADMIN — CALCIUM CHLORIDE, MAGNESIUM CHLORIDE, DEXTROSE MONOHYDRATE, LACTIC ACID, SODIUM CHLORIDE, SODIUM BICARBONATE AND POTASSIUM CHLORIDE: 3.68; 3.05; 22; 5.4; 6.46; 3.09; .314 INJECTION INTRAVENOUS at 08:24

## 2023-01-01 RX ADMIN — INSULIN GLARGINE 10 UNITS: 100 INJECTION, SOLUTION SUBCUTANEOUS at 08:46

## 2023-01-01 RX ADMIN — CALCIUM CHLORIDE, MAGNESIUM CHLORIDE, DEXTROSE MONOHYDRATE, LACTIC ACID, SODIUM CHLORIDE, SODIUM BICARBONATE AND POTASSIUM CHLORIDE: 3.68; 3.05; 22; 5.4; 6.46; 3.09; .314 INJECTION INTRAVENOUS at 23:13

## 2023-01-01 RX ADMIN — APIXABAN 10 MG: 5 TABLET, FILM COATED ORAL at 10:05

## 2023-01-01 RX ADMIN — ALBUMIN (HUMAN) 12.5 G: 0.25 INJECTION, SOLUTION INTRAVENOUS at 11:43

## 2023-01-01 RX ADMIN — SODIUM CHLORIDE, PRESERVATIVE FREE 10 ML: 5 INJECTION INTRAVENOUS at 05:33

## 2023-01-01 RX ADMIN — LABETALOL HYDROCHLORIDE 5 MG: 5 INJECTION INTRAVENOUS at 17:51

## 2023-01-01 RX ADMIN — CHLORHEXIDINE GLUCONATE 10 ML: 1.2 RINSE ORAL at 09:00

## 2023-01-01 RX ADMIN — SODIUM CHLORIDE, PRESERVATIVE FREE 10 ML: 5 INJECTION INTRAVENOUS at 13:57

## 2023-01-01 RX ADMIN — SODIUM CHLORIDE, PRESERVATIVE FREE 10 ML: 5 INJECTION INTRAVENOUS at 21:09

## 2023-01-01 RX ADMIN — MAGNESIUM OXIDE 400 MG (241.3 MG MAGNESIUM) TABLET 400 MG: TABLET at 08:42

## 2023-01-01 RX ADMIN — VANCOMYCIN HYDROCHLORIDE 750 MG: 750 INJECTION, POWDER, LYOPHILIZED, FOR SOLUTION INTRAVENOUS at 17:17

## 2023-01-01 RX ADMIN — DEXMEDETOMIDINE HYDROCHLORIDE 0.9 MCG/KG/HR: 4 INJECTION, SOLUTION INTRAVENOUS at 20:07

## 2023-01-01 RX ADMIN — Medication 6 UNITS: at 08:39

## 2023-01-01 RX ADMIN — SODIUM CHLORIDE, PRESERVATIVE FREE 10 ML: 5 INJECTION INTRAVENOUS at 14:46

## 2023-01-01 RX ADMIN — MUPIROCIN: 20 OINTMENT TOPICAL at 09:40

## 2023-01-01 RX ADMIN — HYDROMORPHONE HYDROCHLORIDE 0.2 MG: 1 INJECTION, SOLUTION INTRAMUSCULAR; INTRAVENOUS; SUBCUTANEOUS at 18:24

## 2023-01-01 RX ADMIN — LEVETIRACETAM 500 MG: 100 INJECTION INTRAVENOUS at 08:54

## 2023-01-01 RX ADMIN — CALCIUM CHLORIDE, MAGNESIUM CHLORIDE, DEXTROSE MONOHYDRATE, LACTIC ACID, SODIUM CHLORIDE, SODIUM BICARBONATE AND POTASSIUM CHLORIDE: 3.68; 3.05; 22; 5.4; 6.46; 3.09; .314 INJECTION INTRAVENOUS at 02:53

## 2023-01-01 RX ADMIN — LEVETIRACETAM 500 MG: 100 INJECTION INTRAVENOUS at 08:49

## 2023-01-01 RX ADMIN — Medication: at 17:52

## 2023-01-01 RX ADMIN — METRONIDAZOLE 500 MG: 500 INJECTION, SOLUTION INTRAVENOUS at 20:48

## 2023-01-01 RX ADMIN — HEPARIN SODIUM 2000 UNITS: 1000 INJECTION INTRAVENOUS; SUBCUTANEOUS at 06:42

## 2023-01-01 RX ADMIN — PIPERACILLIN AND TAZOBACTAM 3.38 G: 3; .375 INJECTION, POWDER, FOR SOLUTION INTRAVENOUS at 13:05

## 2023-01-01 RX ADMIN — ALBUMIN (HUMAN) 12.5 G: 0.25 INJECTION, SOLUTION INTRAVENOUS at 18:19

## 2023-01-01 RX ADMIN — Medication 16 UNITS: at 20:50

## 2023-01-01 RX ADMIN — Medication: at 09:13

## 2023-01-01 RX ADMIN — EPOETIN ALFA-EPBX 10000 UNITS: 10000 INJECTION, SOLUTION INTRAVENOUS; SUBCUTANEOUS at 21:19

## 2023-01-01 RX ADMIN — ALBUMIN (HUMAN) 12.5 G: 0.25 INJECTION, SOLUTION INTRAVENOUS at 12:23

## 2023-01-01 RX ADMIN — Medication 6 UNITS: at 19:04

## 2023-01-01 RX ADMIN — Medication 2 UNITS: at 12:30

## 2023-01-01 RX ADMIN — HEPARIN SODIUM 10 ML/HR: 10000 INJECTION, SOLUTION INTRAVENOUS at 02:21

## 2023-01-01 RX ADMIN — Medication 3 UNITS: at 12:08

## 2023-01-01 RX ADMIN — ROSUVASTATIN 20 MG: 10 TABLET, FILM COATED ORAL at 22:09

## 2023-01-01 RX ADMIN — CALCIUM CHLORIDE, MAGNESIUM CHLORIDE, DEXTROSE MONOHYDRATE, LACTIC ACID, SODIUM CHLORIDE, SODIUM BICARBONATE AND POTASSIUM CHLORIDE: 3.68; 3.05; 22; 5.4; 6.46; 3.09; .314 INJECTION INTRAVENOUS at 06:22

## 2023-01-01 RX ADMIN — Medication: at 09:37

## 2023-01-01 RX ADMIN — Medication 7 UNITS: at 17:45

## 2023-01-01 RX ADMIN — CALCIUM CHLORIDE, MAGNESIUM CHLORIDE, DEXTROSE MONOHYDRATE, LACTIC ACID, SODIUM CHLORIDE, SODIUM BICARBONATE AND POTASSIUM CHLORIDE: 3.68; 3.05; 22; 5.4; 6.46; 3.09; .314 INJECTION INTRAVENOUS at 08:33

## 2023-01-01 RX ADMIN — CEFAZOLIN 1 G: 1 INJECTION, POWDER, FOR SOLUTION INTRAMUSCULAR; INTRAVENOUS at 13:49

## 2023-01-01 RX ADMIN — NEOMYCIN SULFATE, POLYMYXIN B SULFATE, AND DEXAMETHASONE: 3.5; 10000; 1 OINTMENT OPHTHALMIC at 08:59

## 2023-01-01 RX ADMIN — VANCOMYCIN HYDROCHLORIDE 750 MG: 750 INJECTION, POWDER, LYOPHILIZED, FOR SOLUTION INTRAVENOUS at 16:06

## 2023-01-01 RX ADMIN — Medication 5 UNITS: at 08:22

## 2023-01-01 RX ADMIN — SENNOSIDES AND DOCUSATE SODIUM 1 TABLET: 50; 8.6 TABLET ORAL at 09:11

## 2023-01-01 RX ADMIN — Medication: at 08:32

## 2023-01-01 RX ADMIN — INSULIN GLARGINE 10 UNITS: 100 INJECTION, SOLUTION SUBCUTANEOUS at 10:01

## 2023-01-01 RX ADMIN — HYDROMORPHONE HYDROCHLORIDE 1 MG: 1 INJECTION, SOLUTION INTRAMUSCULAR; INTRAVENOUS; SUBCUTANEOUS at 08:48

## 2023-01-01 RX ADMIN — ASPIRIN 81 MG CHEWABLE TABLET 81 MG: 81 TABLET CHEWABLE at 09:31

## 2023-01-01 RX ADMIN — Medication 10 UNITS: at 08:31

## 2023-01-01 RX ADMIN — SODIUM CHLORIDE, PRESERVATIVE FREE 10 ML: 5 INJECTION INTRAVENOUS at 06:48

## 2023-01-01 RX ADMIN — Medication 2 UNITS: at 19:36

## 2023-01-01 RX ADMIN — CALCITONIN SALMON 256 INT'L UNITS: 200 INJECTION, SOLUTION INTRAMUSCULAR; SUBCUTANEOUS at 15:38

## 2023-01-01 RX ADMIN — HYDROMORPHONE HYDROCHLORIDE 1 MG: 1 INJECTION, SOLUTION INTRAMUSCULAR; INTRAVENOUS; SUBCUTANEOUS at 05:57

## 2023-01-01 RX ADMIN — FLUCONAZOLE IN SODIUM CHLORIDE 400 MG: 2 INJECTION, SOLUTION INTRAVENOUS at 10:49

## 2023-01-01 RX ADMIN — SODIUM CHLORIDE, PRESERVATIVE FREE 10 ML: 5 INJECTION INTRAVENOUS at 21:23

## 2023-01-01 RX ADMIN — CALCIUM CHLORIDE, MAGNESIUM CHLORIDE, DEXTROSE MONOHYDRATE, LACTIC ACID, SODIUM CHLORIDE, SODIUM BICARBONATE AND POTASSIUM CHLORIDE: 3.68; 3.05; 22; 5.4; 6.46; 3.09; .314 INJECTION INTRAVENOUS at 02:41

## 2023-01-01 RX ADMIN — SODIUM CHLORIDE, PRESERVATIVE FREE 10 ML: 5 INJECTION INTRAVENOUS at 07:33

## 2023-01-01 RX ADMIN — CALCIUM CHLORIDE, MAGNESIUM CHLORIDE, DEXTROSE MONOHYDRATE, LACTIC ACID, SODIUM CHLORIDE, SODIUM BICARBONATE AND POTASSIUM CHLORIDE: 3.68; 3.05; 22; 5.4; 6.46; 3.09; .314 INJECTION INTRAVENOUS at 23:34

## 2023-01-01 RX ADMIN — FLUCONAZOLE IN SODIUM CHLORIDE 400 MG: 2 INJECTION, SOLUTION INTRAVENOUS at 14:00

## 2023-01-01 RX ADMIN — HYDROMORPHONE HYDROCHLORIDE 0.5 MG: 1 INJECTION, SOLUTION INTRAMUSCULAR; INTRAVENOUS; SUBCUTANEOUS at 15:10

## 2023-01-01 RX ADMIN — NOREPINEPHRINE BITARTRATE 3 MCG/MIN: 1 INJECTION, SOLUTION, CONCENTRATE INTRAVENOUS at 01:36

## 2023-01-01 RX ADMIN — Medication: at 17:16

## 2023-01-01 RX ADMIN — OXYCODONE HYDROCHLORIDE 5 MG: 5 TABLET ORAL at 13:08

## 2023-01-01 RX ADMIN — CALCIUM CHLORIDE, MAGNESIUM CHLORIDE, DEXTROSE MONOHYDRATE, LACTIC ACID, SODIUM CHLORIDE, SODIUM BICARBONATE AND POTASSIUM CHLORIDE: 3.68; 3.05; 22; 5.4; 6.46; 3.09; .314 INJECTION INTRAVENOUS at 14:08

## 2023-01-01 RX ADMIN — HEPARIN SODIUM 1500 UNITS: 1000 INJECTION INTRAVENOUS; SUBCUTANEOUS at 07:35

## 2023-01-01 RX ADMIN — PANTOPRAZOLE SODIUM 40 MG: 40 TABLET, DELAYED RELEASE ORAL at 06:33

## 2023-01-01 RX ADMIN — AMIODARONE HYDROCHLORIDE 400 MG: 200 TABLET ORAL at 21:31

## 2023-01-01 RX ADMIN — ASPIRIN 81 MG CHEWABLE TABLET 81 MG: 81 TABLET CHEWABLE at 08:42

## 2023-01-01 RX ADMIN — NOREPINEPHRINE BITARTRATE 4 MCG/MIN: 1 INJECTION, SOLUTION, CONCENTRATE INTRAVENOUS at 14:13

## 2023-01-01 RX ADMIN — WHITE PETROLATUM 57.7 %-MINERAL OIL 31.9 % EYE OINTMENT: at 14:18

## 2023-01-01 RX ADMIN — SODIUM CHLORIDE 5 MG/HR: 9 INJECTION, SOLUTION INTRAVENOUS at 17:51

## 2023-01-01 RX ADMIN — PROPOFOL 15 MCG/KG/MIN: 10 INJECTION, EMULSION INTRAVENOUS at 00:03

## 2023-01-01 RX ADMIN — Medication: at 17:33

## 2023-01-01 RX ADMIN — CHLORHEXIDINE GLUCONATE 10 ML: 1.2 RINSE ORAL at 17:31

## 2023-01-01 RX ADMIN — ALBUMIN (HUMAN) 12.5 G: 0.25 INJECTION, SOLUTION INTRAVENOUS at 21:06

## 2023-01-01 RX ADMIN — Medication 8 UNITS: at 12:58

## 2023-01-01 RX ADMIN — ALTEPLASE 3 MG: 2.2 INJECTION, POWDER, LYOPHILIZED, FOR SOLUTION INTRAVENOUS at 19:00

## 2023-01-01 RX ADMIN — Medication: at 18:19

## 2023-01-01 RX ADMIN — Medication 1 PACKET: at 18:28

## 2023-01-01 RX ADMIN — APIXABAN 10 MG: 5 TABLET, FILM COATED ORAL at 08:56

## 2023-01-01 RX ADMIN — CALCIUM CHLORIDE, MAGNESIUM CHLORIDE, DEXTROSE MONOHYDRATE, LACTIC ACID, SODIUM CHLORIDE, SODIUM BICARBONATE AND POTASSIUM CHLORIDE: 5.15; 2.03; 22; 5.4; 6.46; 3.09; .157 INJECTION INTRAVENOUS at 21:03

## 2023-01-01 RX ADMIN — ALBUMIN (HUMAN) 12.5 G: 0.25 INJECTION, SOLUTION INTRAVENOUS at 17:14

## 2023-01-01 RX ADMIN — Medication 6 UNITS: at 22:58

## 2023-01-01 RX ADMIN — TRAZODONE HYDROCHLORIDE 50 MG: 50 TABLET ORAL at 21:34

## 2023-01-01 RX ADMIN — ALBUMIN (HUMAN) 12.5 G: 0.25 INJECTION, SOLUTION INTRAVENOUS at 16:11

## 2023-01-01 RX ADMIN — DEXMEDETOMIDINE HYDROCHLORIDE 1.3 MCG/KG/HR: 4 INJECTION, SOLUTION INTRAVENOUS at 05:57

## 2023-01-01 RX ADMIN — ONDANSETRON HYDROCHLORIDE 4 MG: 2 SOLUTION INTRAMUSCULAR; INTRAVENOUS at 12:12

## 2023-01-01 RX ADMIN — PANTOPRAZOLE SODIUM 40 MG: 40 TABLET, DELAYED RELEASE ORAL at 07:06

## 2023-01-01 RX ADMIN — LEVETIRACETAM 500 MG: 100 INJECTION, SOLUTION, CONCENTRATE INTRAVENOUS at 02:15

## 2023-01-01 RX ADMIN — Medication 2 UNITS: at 08:30

## 2023-01-01 RX ADMIN — CALCIUM CHLORIDE, MAGNESIUM CHLORIDE, DEXTROSE MONOHYDRATE, LACTIC ACID, SODIUM CHLORIDE, SODIUM BICARBONATE AND POTASSIUM CHLORIDE: 3.68; 3.05; 22; 5.4; 6.46; 3.09; .314 INJECTION INTRAVENOUS at 19:29

## 2023-01-01 RX ADMIN — Medication 2 UNITS: at 11:56

## 2023-01-01 RX ADMIN — ALBUMIN (HUMAN) 12.5 G: 0.25 INJECTION, SOLUTION INTRAVENOUS at 11:40

## 2023-01-01 RX ADMIN — MUPIROCIN: 20 OINTMENT TOPICAL at 17:59

## 2023-01-01 RX ADMIN — NEOMYCIN SULFATE, POLYMYXIN B SULFATE, AND DEXAMETHASONE: 3.5; 10000; 1 OINTMENT OPHTHALMIC at 14:26

## 2023-01-01 RX ADMIN — SODIUM PHOSPHATE, MONOBASIC, MONOHYDRATE AND SODIUM PHOSPHATE, DIBASIC, ANHYDROUS: 276; 142 INJECTION, SOLUTION INTRAVENOUS at 18:08

## 2023-01-01 RX ADMIN — HEPARIN SODIUM 1700 UNITS: 1000 INJECTION INTRAVENOUS; SUBCUTANEOUS at 13:41

## 2023-01-01 RX ADMIN — NOREPINEPHRINE BITARTRATE 7 MCG/MIN: 1 INJECTION, SOLUTION, CONCENTRATE INTRAVENOUS at 06:18

## 2023-01-01 RX ADMIN — CALCIUM CHLORIDE, MAGNESIUM CHLORIDE, DEXTROSE MONOHYDRATE, LACTIC ACID, SODIUM CHLORIDE, SODIUM BICARBONATE AND POTASSIUM CHLORIDE: 3.68; 3.05; 22; 5.4; 6.46; 3.09; .314 INJECTION INTRAVENOUS at 17:47

## 2023-01-01 RX ADMIN — PROPOFOL 30 MCG/KG/MIN: 10 INJECTION, EMULSION INTRAVENOUS at 11:00

## 2023-01-01 RX ADMIN — HYDROMORPHONE HYDROCHLORIDE 0.5 MG: 1 INJECTION, SOLUTION INTRAMUSCULAR; INTRAVENOUS; SUBCUTANEOUS at 05:01

## 2023-01-01 RX ADMIN — LEVETIRACETAM 500 MG: 100 INJECTION, SOLUTION, CONCENTRATE INTRAVENOUS at 13:24

## 2023-01-01 RX ADMIN — Medication: at 08:31

## 2023-01-01 RX ADMIN — Medication: at 19:04

## 2023-01-01 RX ADMIN — ALTEPLASE 3 MG: 2.2 INJECTION, POWDER, LYOPHILIZED, FOR SOLUTION INTRAVENOUS at 06:08

## 2023-01-01 RX ADMIN — SODIUM CHLORIDE, PRESERVATIVE FREE 40 MG: 5 INJECTION INTRAVENOUS at 08:12

## 2023-01-01 RX ADMIN — ALBUMIN (HUMAN) 12.5 G: 0.25 INJECTION, SOLUTION INTRAVENOUS at 17:57

## 2023-01-01 RX ADMIN — Medication: at 17:54

## 2023-01-01 RX ADMIN — SODIUM CHLORIDE, PRESERVATIVE FREE 10 ML: 5 INJECTION INTRAVENOUS at 07:39

## 2023-01-01 RX ADMIN — PIPERACILLIN AND TAZOBACTAM 3.38 G: 3; .375 INJECTION, POWDER, FOR SOLUTION INTRAVENOUS at 05:43

## 2023-01-01 RX ADMIN — ALBUMIN (HUMAN) 12.5 G: 0.25 INJECTION, SOLUTION INTRAVENOUS at 13:16

## 2023-01-01 RX ADMIN — HYDROMORPHONE HYDROCHLORIDE 1 MG: 1 INJECTION, SOLUTION INTRAMUSCULAR; INTRAVENOUS; SUBCUTANEOUS at 09:45

## 2023-01-01 RX ADMIN — HYDROMORPHONE HYDROCHLORIDE 0.5 MG: 1 INJECTION, SOLUTION INTRAMUSCULAR; INTRAVENOUS; SUBCUTANEOUS at 22:33

## 2023-01-01 RX ADMIN — DEXTROSE MONOHYDRATE 30 ML/HR: 100 INJECTION, SOLUTION INTRAVENOUS at 08:27

## 2023-01-01 RX ADMIN — Medication 8 UNITS: at 12:43

## 2023-01-01 RX ADMIN — EPINEPHRINE 1 MCG/MIN: 1 INJECTION INTRAMUSCULAR; INTRAVENOUS; SUBCUTANEOUS at 06:09

## 2023-01-01 RX ADMIN — AMIODARONE HYDROCHLORIDE 200 MG: 200 TABLET ORAL at 17:05

## 2023-01-01 RX ADMIN — CEFAZOLIN 1 G: 1 INJECTION, POWDER, FOR SOLUTION INTRAMUSCULAR; INTRAVENOUS at 06:28

## 2023-01-01 RX ADMIN — CEFAZOLIN 2 G: 1 INJECTION, POWDER, FOR SOLUTION INTRAMUSCULAR; INTRAVENOUS at 14:25

## 2023-01-01 RX ADMIN — LEVETIRACETAM 500 MG: 100 INJECTION, SOLUTION, CONCENTRATE INTRAVENOUS at 01:08

## 2023-01-01 RX ADMIN — EPOETIN ALFA-EPBX 10000 UNITS: 10000 INJECTION, SOLUTION INTRAVENOUS; SUBCUTANEOUS at 21:05

## 2023-01-01 RX ADMIN — MILRINONE LACTATE IN DEXTROSE 0.38 MCG/KG/MIN: 200 INJECTION, SOLUTION INTRAVENOUS at 06:33

## 2023-01-01 RX ADMIN — DEXMEDETOMIDINE HYDROCHLORIDE 0.8 MCG/KG/HR: 4 INJECTION, SOLUTION INTRAVENOUS at 00:37

## 2023-01-01 RX ADMIN — ONDANSETRON 4 MG: 2 INJECTION INTRAMUSCULAR; INTRAVENOUS at 10:13

## 2023-01-01 RX ADMIN — OXYCODONE HYDROCHLORIDE 10 MG: 5 TABLET ORAL at 22:06

## 2023-01-01 RX ADMIN — MEROPENEM 1 G: 1 INJECTION, POWDER, FOR SOLUTION INTRAVENOUS at 05:57

## 2023-01-01 RX ADMIN — CALCIUM CHLORIDE, MAGNESIUM CHLORIDE, DEXTROSE MONOHYDRATE, LACTIC ACID, SODIUM CHLORIDE, SODIUM BICARBONATE AND POTASSIUM CHLORIDE 1700 ML/HR: 3.68; 3.05; 22; 5.4; 6.46; 3.09; .314 INJECTION INTRAVENOUS at 16:54

## 2023-01-01 RX ADMIN — CALCIUM CHLORIDE, MAGNESIUM CHLORIDE, DEXTROSE MONOHYDRATE, LACTIC ACID, SODIUM CHLORIDE, SODIUM BICARBONATE AND POTASSIUM CHLORIDE: 3.68; 3.05; 22; 5.4; 6.46; 3.09; .314 INJECTION INTRAVENOUS at 06:31

## 2023-01-01 RX ADMIN — ALBUMIN (HUMAN) 12.5 G: 0.25 INJECTION, SOLUTION INTRAVENOUS at 00:20

## 2023-01-01 RX ADMIN — ALBUMIN (HUMAN) 12.5 G: 0.25 INJECTION, SOLUTION INTRAVENOUS at 12:50

## 2023-01-01 RX ADMIN — CALCIUM CHLORIDE, MAGNESIUM CHLORIDE, DEXTROSE MONOHYDRATE, LACTIC ACID, SODIUM CHLORIDE, SODIUM BICARBONATE AND POTASSIUM CHLORIDE: 3.68; 3.05; 22; 5.4; 6.46; 3.09; .314 INJECTION INTRAVENOUS at 03:22

## 2023-01-01 RX ADMIN — Medication: at 17:08

## 2023-01-01 RX ADMIN — FLUCONAZOLE IN SODIUM CHLORIDE 400 MG: 2 INJECTION, SOLUTION INTRAVENOUS at 10:45

## 2023-01-01 RX ADMIN — Medication 2 UNITS: at 07:06

## 2023-01-01 RX ADMIN — PIPERACILLIN AND TAZOBACTAM 3.38 G: 3; .375 INJECTION, POWDER, FOR SOLUTION INTRAVENOUS at 17:19

## 2023-01-01 RX ADMIN — SODIUM CHLORIDE, PRESERVATIVE FREE 10 ML: 5 INJECTION INTRAVENOUS at 05:22

## 2023-01-01 RX ADMIN — NOREPINEPHRINE BITARTRATE 3 MCG/MIN: 1 INJECTION, SOLUTION, CONCENTRATE INTRAVENOUS at 04:47

## 2023-01-01 RX ADMIN — CINACALCET HYDROCHLORIDE 90 MG: 30 TABLET, FILM COATED ORAL at 17:09

## 2023-01-01 RX ADMIN — MILRINONE LACTATE IN DEXTROSE 0.38 MCG/KG/MIN: 200 INJECTION, SOLUTION INTRAVENOUS at 20:31

## 2023-01-01 RX ADMIN — NEOMYCIN SULFATE, POLYMYXIN B SULFATE, AND DEXAMETHASONE: 3.5; 10000; 1 OINTMENT OPHTHALMIC at 08:25

## 2023-01-01 RX ADMIN — HEPARIN SODIUM 10 UNITS/KG/HR: 10000 INJECTION, SOLUTION INTRAVENOUS at 08:05

## 2023-01-01 RX ADMIN — POTASSIUM CHLORIDE 20 MEQ: 29.8 INJECTION, SOLUTION INTRAVENOUS at 23:32

## 2023-01-01 RX ADMIN — Medication 2 UNITS: at 21:47

## 2023-01-01 RX ADMIN — METRONIDAZOLE 500 MG: 500 INJECTION, SOLUTION INTRAVENOUS at 09:43

## 2023-01-01 RX ADMIN — FUROSEMIDE 40 MG: 10 INJECTION, SOLUTION INTRAMUSCULAR; INTRAVENOUS at 17:05

## 2023-01-01 RX ADMIN — APIXABAN 10 MG: 5 TABLET, FILM COATED ORAL at 08:46

## 2023-01-01 RX ADMIN — FENTANYL CITRATE 125 MCG/HR: 0.05 INJECTION, SOLUTION INTRAMUSCULAR; INTRAVENOUS at 05:49

## 2023-01-01 RX ADMIN — HYDRALAZINE HYDROCHLORIDE 5 MG: 20 INJECTION INTRAMUSCULAR; INTRAVENOUS at 13:16

## 2023-01-01 RX ADMIN — HYDROMORPHONE HYDROCHLORIDE 1 MG: 1 INJECTION, SOLUTION INTRAMUSCULAR; INTRAVENOUS; SUBCUTANEOUS at 05:59

## 2023-01-01 RX ADMIN — AMIODARONE HYDROCHLORIDE 400 MG: 200 TABLET ORAL at 18:58

## 2023-01-01 RX ADMIN — PIPERACILLIN AND TAZOBACTAM 3.38 G: 3; .375 INJECTION, POWDER, FOR SOLUTION INTRAVENOUS at 13:53

## 2023-01-01 RX ADMIN — PIPERACILLIN AND TAZOBACTAM 3.38 G: 3; .375 INJECTION, POWDER, FOR SOLUTION INTRAVENOUS at 12:02

## 2023-01-01 RX ADMIN — CALCIUM CHLORIDE, MAGNESIUM CHLORIDE, DEXTROSE MONOHYDRATE, LACTIC ACID, SODIUM CHLORIDE, SODIUM BICARBONATE AND POTASSIUM CHLORIDE: 3.68; 3.05; 22; 5.4; 6.46; 3.09; .314 INJECTION INTRAVENOUS at 02:20

## 2023-01-01 RX ADMIN — DOXYCYCLINE 100 MG: 100 INJECTION, POWDER, LYOPHILIZED, FOR SOLUTION INTRAVENOUS at 13:52

## 2023-01-01 RX ADMIN — ALBUMIN (HUMAN) 12.5 G: 0.25 INJECTION, SOLUTION INTRAVENOUS at 06:27

## 2023-01-01 RX ADMIN — DEXMEDETOMIDINE HYDROCHLORIDE 1 MCG/KG/HR: 4 INJECTION, SOLUTION INTRAVENOUS at 00:49

## 2023-01-01 RX ADMIN — SODIUM CHLORIDE, PRESERVATIVE FREE 10 ML: 5 INJECTION INTRAVENOUS at 15:02

## 2023-01-01 RX ADMIN — CALCIUM CHLORIDE, MAGNESIUM CHLORIDE, DEXTROSE MONOHYDRATE, LACTIC ACID, SODIUM CHLORIDE, SODIUM BICARBONATE AND POTASSIUM CHLORIDE: 3.68; 3.05; 22; 5.4; 6.46; 3.09; .314 INJECTION INTRAVENOUS at 03:27

## 2023-01-01 RX ADMIN — AMIODARONE HYDROCHLORIDE 400 MG: 200 TABLET ORAL at 08:35

## 2023-01-01 RX ADMIN — PIPERACILLIN AND TAZOBACTAM 3.38 G: 3; .375 INJECTION, POWDER, FOR SOLUTION INTRAVENOUS at 05:23

## 2023-01-01 RX ADMIN — NEOMYCIN SULFATE, POLYMYXIN B SULFATE, AND DEXAMETHASONE: 3.5; 10000; 1 OINTMENT OPHTHALMIC at 17:52

## 2023-01-01 RX ADMIN — ALBUMIN (HUMAN) 25 G: 0.25 INJECTION, SOLUTION INTRAVENOUS at 00:35

## 2023-01-01 RX ADMIN — Medication 10 UNITS: at 09:34

## 2023-01-01 RX ADMIN — Medication: at 17:17

## 2023-01-01 RX ADMIN — PIPERACILLIN AND TAZOBACTAM 3.38 G: 3; .375 INJECTION, POWDER, FOR SOLUTION INTRAVENOUS at 13:02

## 2023-01-01 RX ADMIN — Medication 2 UNITS: at 08:45

## 2023-01-01 RX ADMIN — PANTOPRAZOLE SODIUM 40 MG: 40 TABLET, DELAYED RELEASE ORAL at 08:35

## 2023-01-01 RX ADMIN — Medication: at 17:31

## 2023-01-01 RX ADMIN — Medication: at 09:14

## 2023-01-01 RX ADMIN — FENTANYL CITRATE 150 MCG/HR: 0.05 INJECTION, SOLUTION INTRAMUSCULAR; INTRAVENOUS at 09:33

## 2023-01-01 RX ADMIN — WARFARIN SODIUM 2.5 MG: 2.5 TABLET ORAL at 18:00

## 2023-01-01 RX ADMIN — ALTEPLASE 3 MG: 2.2 INJECTION, POWDER, LYOPHILIZED, FOR SOLUTION INTRAVENOUS at 20:19

## 2023-01-01 RX ADMIN — MEROPENEM 1 G: 1 INJECTION, POWDER, FOR SOLUTION INTRAVENOUS at 18:56

## 2023-01-01 RX ADMIN — SUCRALFATE 1 G: 1 TABLET ORAL at 06:39

## 2023-01-01 RX ADMIN — DOXYCYCLINE 100 MG: 100 INJECTION, POWDER, LYOPHILIZED, FOR SOLUTION INTRAVENOUS at 00:14

## 2023-01-01 RX ADMIN — CALCIUM CHLORIDE, MAGNESIUM CHLORIDE, DEXTROSE MONOHYDRATE, LACTIC ACID, SODIUM CHLORIDE, SODIUM BICARBONATE AND POTASSIUM CHLORIDE: 3.68; 3.05; 22; 5.4; 6.46; 3.09; .314 INJECTION INTRAVENOUS at 17:15

## 2023-01-01 RX ADMIN — ALBUMIN (HUMAN) 12.5 G: 0.25 INJECTION, SOLUTION INTRAVENOUS at 17:31

## 2023-01-01 RX ADMIN — SODIUM CHLORIDE 5.5 UNITS/HR: 9 INJECTION, SOLUTION INTRAVENOUS at 19:59

## 2023-01-01 RX ADMIN — ALBUMIN (HUMAN) 12.5 G: 0.25 INJECTION, SOLUTION INTRAVENOUS at 21:09

## 2023-01-01 RX ADMIN — Medication 6 UNITS: at 12:33

## 2023-01-01 RX ADMIN — SODIUM CHLORIDE 3.8 UNITS/HR: 9 INJECTION, SOLUTION INTRAVENOUS at 23:20

## 2023-01-01 RX ADMIN — PIPERACILLIN AND TAZOBACTAM 3.38 G: 3; .375 INJECTION, POWDER, FOR SOLUTION INTRAVENOUS at 20:57

## 2023-01-01 RX ADMIN — COLCHICINE 0.6 MG: 0.6 TABLET, FILM COATED ORAL at 09:31

## 2023-01-01 RX ADMIN — SENNOSIDES AND DOCUSATE SODIUM 1 TABLET: 50; 8.6 TABLET ORAL at 10:48

## 2023-01-01 RX ADMIN — ACETAMINOPHEN 650 MG: 325 TABLET ORAL at 21:14

## 2023-01-01 RX ADMIN — PANTOPRAZOLE SODIUM 40 MG: 40 TABLET, DELAYED RELEASE ORAL at 06:36

## 2023-01-01 RX ADMIN — DOBUTAMINE HYDROCHLORIDE 2 MCG/KG/MIN: 200 INJECTION INTRAVENOUS at 01:10

## 2023-01-01 RX ADMIN — ALBUMIN (HUMAN) 12.5 G: 0.25 INJECTION, SOLUTION INTRAVENOUS at 08:26

## 2023-01-01 RX ADMIN — Medication 8 UNITS: at 12:53

## 2023-01-01 RX ADMIN — CINACALCET HYDROCHLORIDE 60 MG: 30 TABLET, FILM COATED ORAL at 08:22

## 2023-01-01 RX ADMIN — MILRINONE LACTATE IN DEXTROSE 0.38 MCG/KG/MIN: 200 INJECTION, SOLUTION INTRAVENOUS at 01:10

## 2023-01-01 RX ADMIN — OXYCODONE HYDROCHLORIDE 10 MG: 5 TABLET ORAL at 06:15

## 2023-01-01 RX ADMIN — DEXTROSE MONOHYDRATE 25 ML: 25 INJECTION, SOLUTION INTRAVENOUS at 10:42

## 2023-01-01 RX ADMIN — SODIUM CHLORIDE, PRESERVATIVE FREE 10 ML: 5 INJECTION INTRAVENOUS at 05:11

## 2023-01-01 RX ADMIN — AMIODARONE HYDROCHLORIDE 400 MG: 200 TABLET ORAL at 17:28

## 2023-01-01 RX ADMIN — ROSUVASTATIN 20 MG: 10 TABLET, FILM COATED ORAL at 20:32

## 2023-01-01 RX ADMIN — POLYETHYLENE GLYCOL 3350 17 G: 17 POWDER, FOR SOLUTION ORAL at 08:40

## 2023-01-01 RX ADMIN — CALCIUM CHLORIDE, MAGNESIUM CHLORIDE, DEXTROSE MONOHYDRATE, LACTIC ACID, SODIUM CHLORIDE, SODIUM BICARBONATE AND POTASSIUM CHLORIDE 1600 ML/HR: 3.68; 3.05; 22; 5.4; 6.46; 3.09; .314 INJECTION INTRAVENOUS at 09:44

## 2023-01-01 RX ADMIN — WHITE PETROLATUM 57.7 %-MINERAL OIL 31.9 % EYE OINTMENT: at 21:31

## 2023-01-01 RX ADMIN — Medication: at 08:49

## 2023-01-01 RX ADMIN — ASPIRIN 81 MG: 81 TABLET, COATED ORAL at 08:52

## 2023-01-01 RX ADMIN — VANCOMYCIN HYDROCHLORIDE 750 MG: 750 INJECTION, POWDER, LYOPHILIZED, FOR SOLUTION INTRAVENOUS at 18:35

## 2023-01-01 RX ADMIN — SODIUM CHLORIDE 9 ML/HR: 9 INJECTION, SOLUTION INTRAVENOUS at 03:52

## 2023-01-01 RX ADMIN — ASPIRIN 81 MG: 81 TABLET, CHEWABLE ORAL at 09:07

## 2023-01-01 RX ADMIN — CALCIUM CHLORIDE, MAGNESIUM CHLORIDE, DEXTROSE MONOHYDRATE, LACTIC ACID, SODIUM CHLORIDE, SODIUM BICARBONATE AND POTASSIUM CHLORIDE: 3.68; 3.05; 22; 5.4; 6.46; 3.09; .314 INJECTION INTRAVENOUS at 22:46

## 2023-01-01 RX ADMIN — NOREPINEPHRINE BITARTRATE 2 MCG/MIN: 1 INJECTION, SOLUTION, CONCENTRATE INTRAVENOUS at 21:41

## 2023-01-01 RX ADMIN — CALCIUM CHLORIDE, MAGNESIUM CHLORIDE, DEXTROSE MONOHYDRATE, LACTIC ACID, SODIUM CHLORIDE, SODIUM BICARBONATE AND POTASSIUM CHLORIDE: 3.68; 3.05; 22; 5.4; 6.46; 3.09; .314 INJECTION INTRAVENOUS at 08:05

## 2023-01-01 RX ADMIN — Medication 8 UNITS: at 19:28

## 2023-01-01 RX ADMIN — DEXMEDETOMIDINE HYDROCHLORIDE 1 MCG/KG/HR: 4 INJECTION, SOLUTION INTRAVENOUS at 11:35

## 2023-01-01 RX ADMIN — PIPERACILLIN AND TAZOBACTAM 3.38 G: 3; .375 INJECTION, POWDER, FOR SOLUTION INTRAVENOUS at 21:40

## 2023-01-01 RX ADMIN — ASPIRIN 81 MG CHEWABLE TABLET 81 MG: 81 TABLET CHEWABLE at 08:22

## 2023-01-01 RX ADMIN — SODIUM CHLORIDE, PRESERVATIVE FREE 10 ML: 5 INJECTION INTRAVENOUS at 21:04

## 2023-01-01 RX ADMIN — PANTOPRAZOLE SODIUM 40 MG: 40 TABLET, DELAYED RELEASE ORAL at 06:57

## 2023-01-01 RX ADMIN — EPINEPHRINE 4 MCG/MIN: 1 INJECTION INTRAMUSCULAR; INTRAVENOUS; SUBCUTANEOUS at 06:26

## 2023-01-01 RX ADMIN — CALCIUM CHLORIDE, MAGNESIUM CHLORIDE, DEXTROSE MONOHYDRATE, LACTIC ACID, SODIUM CHLORIDE, SODIUM BICARBONATE AND POTASSIUM CHLORIDE: 3.68; 3.05; 22; 5.4; 6.46; 3.09; .314 INJECTION INTRAVENOUS at 14:38

## 2023-01-01 RX ADMIN — SODIUM CHLORIDE, PRESERVATIVE FREE 10 ML: 5 INJECTION INTRAVENOUS at 05:58

## 2023-01-01 RX ADMIN — LEVETIRACETAM 500 MG: 100 INJECTION INTRAVENOUS at 08:32

## 2023-01-01 RX ADMIN — CALCIUM CHLORIDE, MAGNESIUM CHLORIDE, DEXTROSE MONOHYDRATE, LACTIC ACID, SODIUM CHLORIDE, SODIUM BICARBONATE AND POTASSIUM CHLORIDE: 3.68; 3.05; 22; 5.4; 6.46; 3.09; .314 INJECTION INTRAVENOUS at 12:19

## 2023-01-01 RX ADMIN — ACETAMINOPHEN 650 MG: 650 SOLUTION ORAL at 19:24

## 2023-01-01 RX ADMIN — HEPARIN SODIUM 1500 UNITS: 1000 INJECTION INTRAVENOUS; SUBCUTANEOUS at 00:37

## 2023-01-01 RX ADMIN — DEXMEDETOMIDINE HYDROCHLORIDE 1.3 MCG/KG/HR: 4 INJECTION, SOLUTION INTRAVENOUS at 09:07

## 2023-01-01 RX ADMIN — BUMETANIDE 1 MG: 1 TABLET ORAL at 10:45

## 2023-01-01 RX ADMIN — EPOETIN ALFA-EPBX 10000 UNITS: 10000 INJECTION, SOLUTION INTRAVENOUS; SUBCUTANEOUS at 22:45

## 2023-01-01 RX ADMIN — EPINEPHRINE 4 MCG/MIN: 1 INJECTION INTRAMUSCULAR; INTRAVENOUS; SUBCUTANEOUS at 09:29

## 2023-01-01 RX ADMIN — SODIUM CHLORIDE, PRESERVATIVE FREE 10 ML: 5 INJECTION INTRAVENOUS at 06:41

## 2023-01-01 RX ADMIN — LEVOFLOXACIN 500 MG: 5 INJECTION, SOLUTION INTRAVENOUS at 04:34

## 2023-01-01 RX ADMIN — MILRINONE LACTATE IN DEXTROSE 0.1 MCG/KG/MIN: 200 INJECTION, SOLUTION INTRAVENOUS at 10:02

## 2023-01-01 RX ADMIN — Medication: at 09:27

## 2023-01-01 RX ADMIN — Medication: at 08:25

## 2023-01-01 RX ADMIN — OXYCODONE HYDROCHLORIDE 5 MG: 5 TABLET ORAL at 05:04

## 2023-01-01 RX ADMIN — ASPIRIN 81 MG: 81 TABLET, COATED ORAL at 08:30

## 2023-01-01 RX ADMIN — CALCIUM CHLORIDE, MAGNESIUM CHLORIDE, DEXTROSE MONOHYDRATE, LACTIC ACID, SODIUM CHLORIDE, SODIUM BICARBONATE AND POTASSIUM CHLORIDE: 3.68; 3.05; 22; 5.4; 6.46; 3.09; .314 INJECTION INTRAVENOUS at 18:17

## 2023-01-01 RX ADMIN — CEFAZOLIN 1 G: 1 INJECTION, POWDER, FOR SOLUTION INTRAMUSCULAR; INTRAVENOUS at 21:44

## 2023-01-01 RX ADMIN — Medication 5 UNITS: at 17:13

## 2023-01-01 RX ADMIN — MILRINONE LACTATE IN DEXTROSE 0.38 MCG/KG/MIN: 200 INJECTION, SOLUTION INTRAVENOUS at 07:40

## 2023-01-01 RX ADMIN — Medication 2 UNITS: at 18:57

## 2023-01-01 RX ADMIN — CALCIUM CHLORIDE, MAGNESIUM CHLORIDE, DEXTROSE MONOHYDRATE, LACTIC ACID, SODIUM CHLORIDE, SODIUM BICARBONATE AND POTASSIUM CHLORIDE: 3.68; 3.05; 22; 5.4; 6.46; 3.09; .314 INJECTION INTRAVENOUS at 02:42

## 2023-01-01 RX ADMIN — EPINEPHRINE 3 MCG/MIN: 1 INJECTION INTRAMUSCULAR; INTRAVENOUS; SUBCUTANEOUS at 04:08

## 2023-01-01 RX ADMIN — IPRATROPIUM BROMIDE AND ALBUTEROL SULFATE 3 ML: .5; 3 SOLUTION RESPIRATORY (INHALATION) at 08:21

## 2023-01-01 RX ADMIN — Medication 10 UNITS: at 21:31

## 2023-01-01 RX ADMIN — PROPOFOL 40 MCG/KG/MIN: 10 INJECTION, EMULSION INTRAVENOUS at 21:20

## 2023-01-01 RX ADMIN — VANCOMYCIN HYDROCHLORIDE 750 MG: 750 INJECTION, POWDER, LYOPHILIZED, FOR SOLUTION INTRAVENOUS at 15:15

## 2023-01-01 RX ADMIN — FLUCONAZOLE IN SODIUM CHLORIDE 400 MG: 2 INJECTION, SOLUTION INTRAVENOUS at 10:48

## 2023-01-01 RX ADMIN — DEXMEDETOMIDINE HYDROCHLORIDE 1.5 MCG/KG/HR: 4 INJECTION, SOLUTION INTRAVENOUS at 04:47

## 2023-01-01 RX ADMIN — PANTOPRAZOLE SODIUM 40 MG: 40 TABLET, DELAYED RELEASE ORAL at 07:01

## 2023-01-01 RX ADMIN — SODIUM CHLORIDE, PRESERVATIVE FREE 40 MG: 5 INJECTION INTRAVENOUS at 10:48

## 2023-01-01 RX ADMIN — DOBUTAMINE HYDROCHLORIDE 2 MCG/KG/MIN: 200 INJECTION INTRAVENOUS at 17:43

## 2023-01-01 RX ADMIN — Medication: at 19:56

## 2023-01-01 RX ADMIN — CALCIUM CHLORIDE, MAGNESIUM CHLORIDE, DEXTROSE MONOHYDRATE, LACTIC ACID, SODIUM CHLORIDE, SODIUM BICARBONATE AND POTASSIUM CHLORIDE: 3.68; 3.05; 22; 5.4; 6.46; 3.09; .314 INJECTION INTRAVENOUS at 12:25

## 2023-01-01 RX ADMIN — ALBUMIN (HUMAN) 12.5 G: 0.25 INJECTION, SOLUTION INTRAVENOUS at 18:34

## 2023-01-01 RX ADMIN — ROSUVASTATIN 20 MG: 10 TABLET, FILM COATED ORAL at 21:05

## 2023-01-01 RX ADMIN — CHLORHEXIDINE GLUCONATE 10 ML: 1.2 RINSE ORAL at 08:41

## 2023-01-01 RX ADMIN — DEXMEDETOMIDINE HYDROCHLORIDE 1.3 MCG/KG/HR: 4 INJECTION, SOLUTION INTRAVENOUS at 00:53

## 2023-01-01 RX ADMIN — METRONIDAZOLE 500 MG: 500 INJECTION, SOLUTION INTRAVENOUS at 20:49

## 2023-01-01 RX ADMIN — CALCIUM CHLORIDE, MAGNESIUM CHLORIDE, DEXTROSE MONOHYDRATE, LACTIC ACID, SODIUM CHLORIDE, SODIUM BICARBONATE AND POTASSIUM CHLORIDE: 3.68; 3.05; 22; 5.4; 6.46; 3.09; .314 INJECTION INTRAVENOUS at 02:01

## 2023-01-01 RX ADMIN — SODIUM CHLORIDE, PRESERVATIVE FREE 10 ML: 5 INJECTION INTRAVENOUS at 22:36

## 2023-01-01 RX ADMIN — CALCIUM CHLORIDE, MAGNESIUM CHLORIDE, DEXTROSE MONOHYDRATE, LACTIC ACID, SODIUM CHLORIDE, SODIUM BICARBONATE AND POTASSIUM CHLORIDE: 3.68; 3.05; 22; 5.4; 6.46; 3.09; .314 INJECTION INTRAVENOUS at 14:43

## 2023-01-01 RX ADMIN — CALCIUM CHLORIDE, MAGNESIUM CHLORIDE, DEXTROSE MONOHYDRATE, LACTIC ACID, SODIUM CHLORIDE, SODIUM BICARBONATE AND POTASSIUM CHLORIDE: 3.68; 3.05; 22; 5.4; 6.46; 3.09; .314 INJECTION INTRAVENOUS at 15:59

## 2023-01-01 RX ADMIN — Medication 1 MG/HR: at 20:07

## 2023-01-01 RX ADMIN — Medication 10 UNITS: at 20:57

## 2023-01-01 RX ADMIN — DIGOXIN 0.12 MG: 125 TABLET ORAL at 22:19

## 2023-01-01 RX ADMIN — METRONIDAZOLE 500 MG: 500 INJECTION, SOLUTION INTRAVENOUS at 20:46

## 2023-01-01 RX ADMIN — DOBUTAMINE HYDROCHLORIDE 2 MCG/KG/MIN: 200 INJECTION INTRAVENOUS at 19:02

## 2023-01-01 RX ADMIN — ALBUMIN (HUMAN) 12.5 G: 0.25 INJECTION, SOLUTION INTRAVENOUS at 05:35

## 2023-01-01 RX ADMIN — PANTOPRAZOLE SODIUM 40 MG: 40 TABLET, DELAYED RELEASE ORAL at 07:29

## 2023-01-01 RX ADMIN — RIFAMPIN 600 MG: 600 INJECTION, POWDER, LYOPHILIZED, FOR SOLUTION INTRAVENOUS at 10:00

## 2023-01-01 RX ADMIN — EPINEPHRINE 8 MCG/MIN: 1 INJECTION INTRAMUSCULAR; INTRAVENOUS; SUBCUTANEOUS at 06:37

## 2023-01-01 RX ADMIN — ONDANSETRON HYDROCHLORIDE 4 MG: 2 SOLUTION INTRAMUSCULAR; INTRAVENOUS at 06:26

## 2023-01-01 RX ADMIN — SODIUM CHLORIDE, PRESERVATIVE FREE 40 MG: 5 INJECTION INTRAVENOUS at 08:22

## 2023-01-01 RX ADMIN — CALCIUM CHLORIDE, MAGNESIUM CHLORIDE, DEXTROSE MONOHYDRATE, LACTIC ACID, SODIUM CHLORIDE, SODIUM BICARBONATE AND POTASSIUM CHLORIDE: 3.68; 3.05; 22; 5.4; 6.46; 3.09; .314 INJECTION INTRAVENOUS at 07:00

## 2023-01-01 RX ADMIN — DEXMEDETOMIDINE HYDROCHLORIDE 0.8 MCG/KG/HR: 4 INJECTION, SOLUTION INTRAVENOUS at 19:01

## 2023-01-01 RX ADMIN — CALCIUM CHLORIDE, MAGNESIUM CHLORIDE, DEXTROSE MONOHYDRATE, LACTIC ACID, SODIUM CHLORIDE, SODIUM BICARBONATE AND POTASSIUM CHLORIDE: 3.68; 3.05; 22; 5.4; 6.46; 3.09; .314 INJECTION INTRAVENOUS at 20:26

## 2023-01-01 RX ADMIN — DEXMEDETOMIDINE HYDROCHLORIDE 0.8 MCG/KG/HR: 4 INJECTION, SOLUTION INTRAVENOUS at 22:43

## 2023-01-01 RX ADMIN — FENTANYL CITRATE 50 MCG: 50 INJECTION, SOLUTION INTRAMUSCULAR; INTRAVENOUS at 20:21

## 2023-01-01 RX ADMIN — BUMETANIDE 1 MG: 0.25 INJECTION, SOLUTION INTRAMUSCULAR; INTRAVENOUS at 12:57

## 2023-01-01 RX ADMIN — PROPOFOL 7 MCG/KG/MIN: 10 INJECTION, EMULSION INTRAVENOUS at 04:53

## 2023-01-01 RX ADMIN — SUCRALFATE 1 G: 1 TABLET ORAL at 21:31

## 2023-01-01 RX ADMIN — SODIUM CHLORIDE 1 G: 9 INJECTION, SOLUTION INTRAMUSCULAR; INTRAVENOUS; SUBCUTANEOUS at 17:34

## 2023-01-01 RX ADMIN — PIPERACILLIN AND TAZOBACTAM 3.38 G: 3; .375 INJECTION, POWDER, FOR SOLUTION INTRAVENOUS at 12:33

## 2023-01-01 RX ADMIN — HYDROMORPHONE HYDROCHLORIDE 0.5 MG: 1 INJECTION, SOLUTION INTRAMUSCULAR; INTRAVENOUS; SUBCUTANEOUS at 13:42

## 2023-01-01 RX ADMIN — COLCHICINE 0.6 MG: 0.6 TABLET, FILM COATED ORAL at 08:31

## 2023-01-01 RX ADMIN — Medication 3 UNITS: at 11:53

## 2023-01-01 RX ADMIN — Medication 2 UNITS: at 21:53

## 2023-01-01 RX ADMIN — DEXMEDETOMIDINE HYDROCHLORIDE 0.3 MCG/KG/HR: 4 INJECTION, SOLUTION INTRAVENOUS at 07:28

## 2023-01-01 RX ADMIN — Medication 10 UNITS: at 08:25

## 2023-01-01 RX ADMIN — MILRINONE LACTATE IN DEXTROSE 0.2 MCG/KG/MIN: 200 INJECTION, SOLUTION INTRAVENOUS at 20:56

## 2023-01-01 RX ADMIN — Medication: at 10:31

## 2023-01-01 RX ADMIN — PIPERACILLIN AND TAZOBACTAM 3.38 G: 3; .375 INJECTION, POWDER, FOR SOLUTION INTRAVENOUS at 20:45

## 2023-01-01 RX ADMIN — APIXABAN 5 MG: 5 TABLET, FILM COATED ORAL at 17:28

## 2023-01-01 RX ADMIN — CALCIUM CHLORIDE, MAGNESIUM CHLORIDE, DEXTROSE MONOHYDRATE, LACTIC ACID, SODIUM CHLORIDE, SODIUM BICARBONATE AND POTASSIUM CHLORIDE: 3.68; 3.05; 22; 5.4; 6.46; 3.09; .314 INJECTION INTRAVENOUS at 07:42

## 2023-01-01 RX ADMIN — DEXMEDETOMIDINE HYDROCHLORIDE 1.5 MCG/KG/HR: 4 INJECTION, SOLUTION INTRAVENOUS at 07:37

## 2023-01-01 RX ADMIN — ACETAMINOPHEN 650 MG: 325 TABLET ORAL at 22:12

## 2023-01-01 RX ADMIN — AMIODARONE HYDROCHLORIDE 400 MG: 200 TABLET ORAL at 10:44

## 2023-01-01 RX ADMIN — METRONIDAZOLE 500 MG: 500 INJECTION, SOLUTION INTRAVENOUS at 20:45

## 2023-01-01 RX ADMIN — HYDROMORPHONE HYDROCHLORIDE 1 MG: 1 INJECTION, SOLUTION INTRAMUSCULAR; INTRAVENOUS; SUBCUTANEOUS at 17:50

## 2023-01-01 RX ADMIN — BISACODYL 10 MG: 10 SUPPOSITORY RECTAL at 08:39

## 2023-01-01 RX ADMIN — DEXMEDETOMIDINE HYDROCHLORIDE 0.8 MCG/KG/HR: 4 INJECTION, SOLUTION INTRAVENOUS at 06:38

## 2023-01-01 RX ADMIN — Medication: at 18:37

## 2023-01-01 RX ADMIN — ROSUVASTATIN 20 MG: 10 TABLET, FILM COATED ORAL at 21:01

## 2023-01-01 RX ADMIN — CALCIUM CHLORIDE, MAGNESIUM CHLORIDE, DEXTROSE MONOHYDRATE, LACTIC ACID, SODIUM CHLORIDE, SODIUM BICARBONATE AND POTASSIUM CHLORIDE: 3.68; 3.05; 22; 5.4; 6.46; 3.09; .314 INJECTION INTRAVENOUS at 03:28

## 2023-01-01 RX ADMIN — Medication 3 UNITS: at 13:14

## 2023-01-01 RX ADMIN — Medication 2 UNITS: at 21:26

## 2023-01-01 RX ADMIN — OXYCODONE HYDROCHLORIDE 10 MG: 5 TABLET ORAL at 18:13

## 2023-01-01 RX ADMIN — MUPIROCIN: 20 OINTMENT TOPICAL at 09:37

## 2023-01-01 RX ADMIN — ASPIRIN 81 MG: 81 TABLET, COATED ORAL at 09:53

## 2023-01-01 RX ADMIN — DOBUTAMINE HYDROCHLORIDE 5 MCG/KG/MIN: 200 INJECTION INTRAVENOUS at 02:00

## 2023-01-01 RX ADMIN — ALBUMIN (HUMAN) 12.5 G: 0.25 INJECTION, SOLUTION INTRAVENOUS at 11:06

## 2023-01-01 RX ADMIN — Medication 0.5 MG/HR: at 18:55

## 2023-01-01 RX ADMIN — Medication 1 CAPSULE: at 08:13

## 2023-01-01 RX ADMIN — HEPARIN SODIUM 7 UNITS/KG/HR: 10000 INJECTION, SOLUTION INTRAVENOUS at 03:06

## 2023-01-01 RX ADMIN — Medication 6 UNITS: at 08:47

## 2023-01-01 RX ADMIN — INSULIN GLARGINE 10 UNITS: 100 INJECTION, SOLUTION SUBCUTANEOUS at 09:03

## 2023-01-01 RX ADMIN — CALCIUM CHLORIDE, MAGNESIUM CHLORIDE, DEXTROSE MONOHYDRATE, LACTIC ACID, SODIUM CHLORIDE, SODIUM BICARBONATE AND POTASSIUM CHLORIDE: 3.68; 3.05; 22; 5.4; 6.46; 3.09; .314 INJECTION INTRAVENOUS at 01:21

## 2023-01-01 RX ADMIN — SODIUM CHLORIDE 3 ML/HR: 9 INJECTION, SOLUTION INTRAVENOUS at 04:40

## 2023-01-01 RX ADMIN — DOBUTAMINE HYDROCHLORIDE 2 MCG/KG/MIN: 200 INJECTION INTRAVENOUS at 06:42

## 2023-01-01 RX ADMIN — MILRINONE LACTATE IN DEXTROSE 0.38 MCG/KG/MIN: 200 INJECTION, SOLUTION INTRAVENOUS at 00:35

## 2023-01-01 RX ADMIN — WARFARIN SODIUM 1 MG: 1 TABLET ORAL at 13:59

## 2023-01-01 RX ADMIN — FLUCONAZOLE IN SODIUM CHLORIDE 400 MG: 2 INJECTION, SOLUTION INTRAVENOUS at 10:19

## 2023-01-01 RX ADMIN — SODIUM CHLORIDE, PRESERVATIVE FREE 10 ML: 5 INJECTION INTRAVENOUS at 05:37

## 2023-01-01 RX ADMIN — Medication: at 08:52

## 2023-01-01 RX ADMIN — MEROPENEM 1 G: 1 INJECTION, POWDER, FOR SOLUTION INTRAVENOUS at 05:46

## 2023-01-01 RX ADMIN — SODIUM CHLORIDE 2.3 UNITS/HR: 9 INJECTION, SOLUTION INTRAVENOUS at 01:39

## 2023-01-01 RX ADMIN — ROCURONIUM BROMIDE 100 MG: 10 INJECTION, SOLUTION INTRAVENOUS at 13:27

## 2023-01-01 RX ADMIN — ALBUMIN (HUMAN) 12.5 G: 0.25 INJECTION, SOLUTION INTRAVENOUS at 05:24

## 2023-01-01 RX ADMIN — PROPOFOL 15 MCG/KG/MIN: 10 INJECTION, EMULSION INTRAVENOUS at 13:55

## 2023-01-01 RX ADMIN — HYDROMORPHONE HYDROCHLORIDE 1 MG: 1 INJECTION, SOLUTION INTRAMUSCULAR; INTRAVENOUS; SUBCUTANEOUS at 04:29

## 2023-01-01 RX ADMIN — INSULIN GLARGINE 10 UNITS: 100 INJECTION, SOLUTION SUBCUTANEOUS at 09:13

## 2023-01-01 RX ADMIN — SODIUM CHLORIDE, PRESERVATIVE FREE 40 ML: 5 INJECTION INTRAVENOUS at 18:18

## 2023-01-01 RX ADMIN — SODIUM CHLORIDE 1 MG/HR: 9 INJECTION, SOLUTION INTRAVENOUS at 17:10

## 2023-01-01 RX ADMIN — DEXMEDETOMIDINE HYDROCHLORIDE 0.8 MCG/KG/HR: 4 INJECTION, SOLUTION INTRAVENOUS at 21:23

## 2023-01-01 RX ADMIN — CEFAZOLIN 1 G: 1 INJECTION, POWDER, FOR SOLUTION INTRAMUSCULAR; INTRAVENOUS at 22:13

## 2023-01-01 RX ADMIN — SODIUM CHLORIDE, PRESERVATIVE FREE 10 ML: 5 INJECTION INTRAVENOUS at 06:16

## 2023-01-01 RX ADMIN — FUROSEMIDE 40 MG: 10 INJECTION, SOLUTION INTRAMUSCULAR; INTRAVENOUS at 19:14

## 2023-01-01 RX ADMIN — PANTOPRAZOLE SODIUM 40 MG: 40 TABLET, DELAYED RELEASE ORAL at 06:22

## 2023-01-01 RX ADMIN — CHLORHEXIDINE GLUCONATE 10 ML: 1.2 RINSE ORAL at 09:41

## 2023-01-01 RX ADMIN — FENTANYL CITRATE 25 MCG: 50 INJECTION, SOLUTION INTRAMUSCULAR; INTRAVENOUS at 21:49

## 2023-01-01 RX ADMIN — IPRATROPIUM BROMIDE 2 SPRAY: 21 SPRAY, METERED NASAL at 21:10

## 2023-01-01 RX ADMIN — CALCIUM CHLORIDE, MAGNESIUM CHLORIDE, DEXTROSE MONOHYDRATE, LACTIC ACID, SODIUM CHLORIDE, SODIUM BICARBONATE AND POTASSIUM CHLORIDE: 3.68; 3.05; 22; 5.4; 6.46; 3.09; .314 INJECTION INTRAVENOUS at 21:00

## 2023-01-01 RX ADMIN — CALCIUM CHLORIDE, MAGNESIUM CHLORIDE, DEXTROSE MONOHYDRATE, LACTIC ACID, SODIUM CHLORIDE, SODIUM BICARBONATE AND POTASSIUM CHLORIDE: 3.68; 3.05; 22; 5.4; 6.46; 3.09; .314 INJECTION INTRAVENOUS at 08:49

## 2023-01-01 RX ADMIN — ASPIRIN 81 MG: 81 TABLET, COATED ORAL at 10:05

## 2023-01-01 RX ADMIN — CALCIUM CHLORIDE, MAGNESIUM CHLORIDE, DEXTROSE MONOHYDRATE, LACTIC ACID, SODIUM CHLORIDE, SODIUM BICARBONATE AND POTASSIUM CHLORIDE: 5.15; 2.03; 22; 5.4; 6.46; 3.09; .157 INJECTION INTRAVENOUS at 14:30

## 2023-01-01 RX ADMIN — PIPERACILLIN AND TAZOBACTAM 3.38 G: 3; .375 INJECTION, POWDER, FOR SOLUTION INTRAVENOUS at 21:05

## 2023-01-01 RX ADMIN — POLYETHYLENE GLYCOL 3350 17 G: 17 POWDER, FOR SOLUTION ORAL at 11:00

## 2023-01-01 RX ADMIN — Medication 3 UNITS: at 13:11

## 2023-01-01 RX ADMIN — Medication: at 17:50

## 2023-01-01 RX ADMIN — DOBUTAMINE HYDROCHLORIDE 7 MCG/KG/MIN: 200 INJECTION INTRAVENOUS at 04:16

## 2023-01-01 RX ADMIN — CEFEPIME HYDROCHLORIDE 0.5 G: 1 INJECTION, POWDER, FOR SOLUTION INTRAMUSCULAR; INTRAVENOUS at 20:37

## 2023-01-01 RX ADMIN — CALCIUM CHLORIDE, MAGNESIUM CHLORIDE, DEXTROSE MONOHYDRATE, LACTIC ACID, SODIUM CHLORIDE, SODIUM BICARBONATE AND POTASSIUM CHLORIDE: 3.68; 3.05; 22; 5.4; 6.46; 3.09; .314 INJECTION INTRAVENOUS at 05:08

## 2023-01-01 RX ADMIN — MEROPENEM 1 G: 1 INJECTION, POWDER, FOR SOLUTION INTRAVENOUS at 18:19

## 2023-01-01 RX ADMIN — SODIUM CHLORIDE 10 ML/HR: 4.5 INJECTION, SOLUTION INTRAVENOUS at 02:00

## 2023-01-01 RX ADMIN — TRAZODONE HYDROCHLORIDE 50 MG: 50 TABLET ORAL at 21:21

## 2023-01-01 RX ADMIN — Medication: at 09:32

## 2023-01-01 RX ADMIN — DEXTROSE MONOHYDRATE 150 MG: 50 INJECTION, SOLUTION INTRAVENOUS at 20:13

## 2023-01-01 RX ADMIN — CINACALCET HYDROCHLORIDE 30 MG: 30 TABLET, FILM COATED ORAL at 09:25

## 2023-01-01 RX ADMIN — ASPIRIN 81 MG CHEWABLE TABLET 81 MG: 81 TABLET CHEWABLE at 10:48

## 2023-01-01 RX ADMIN — Medication: at 17:19

## 2023-01-01 RX ADMIN — Medication 7 UNITS: at 13:22

## 2023-01-01 RX ADMIN — MEROPENEM 1 G: 1 INJECTION, POWDER, FOR SOLUTION INTRAVENOUS at 17:31

## 2023-01-01 RX ADMIN — ALBUMIN (HUMAN) 12.5 G: 0.25 INJECTION, SOLUTION INTRAVENOUS at 21:12

## 2023-01-01 RX ADMIN — CALCIUM CHLORIDE, MAGNESIUM CHLORIDE, DEXTROSE MONOHYDRATE, LACTIC ACID, SODIUM CHLORIDE, SODIUM BICARBONATE AND POTASSIUM CHLORIDE: 3.68; 3.05; 22; 5.4; 6.46; 3.09; .314 INJECTION INTRAVENOUS at 13:51

## 2023-01-01 RX ADMIN — SODIUM CHLORIDE, PRESERVATIVE FREE 1 G: 5 INJECTION INTRAVENOUS at 13:22

## 2023-01-01 RX ADMIN — SODIUM CHLORIDE, PRESERVATIVE FREE 10 ML: 5 INJECTION INTRAVENOUS at 21:46

## 2023-01-01 RX ADMIN — CINACALCET HYDROCHLORIDE 30 MG: 30 TABLET, FILM COATED ORAL at 13:50

## 2023-01-01 RX ADMIN — CINACALCET HYDROCHLORIDE 60 MG: 30 TABLET, FILM COATED ORAL at 09:10

## 2023-01-01 RX ADMIN — Medication: at 17:25

## 2023-01-01 RX ADMIN — EPINEPHRINE 5 MCG/MIN: 1 INJECTION INTRAMUSCULAR; INTRAVENOUS; SUBCUTANEOUS at 22:13

## 2023-01-01 RX ADMIN — SODIUM PHOSPHATE, MONOBASIC, MONOHYDRATE AND SODIUM PHOSPHATE, DIBASIC, ANHYDROUS: 142; 276 INJECTION, SOLUTION INTRAVENOUS at 14:29

## 2023-01-01 RX ADMIN — ROSUVASTATIN 20 MG: 10 TABLET, FILM COATED ORAL at 21:46

## 2023-01-01 RX ADMIN — LEVOFLOXACIN 500 MG: 5 INJECTION, SOLUTION INTRAVENOUS at 04:09

## 2023-01-01 RX ADMIN — DEXTROSE MONOHYDRATE 9 ML: 25 INJECTION, SOLUTION INTRAVENOUS at 12:25

## 2023-01-01 RX ADMIN — SENNOSIDES AND DOCUSATE SODIUM 1 TABLET: 50; 8.6 TABLET ORAL at 07:32

## 2023-01-01 RX ADMIN — DOXYCYCLINE 100 MG: 100 INJECTION, POWDER, LYOPHILIZED, FOR SOLUTION INTRAVENOUS at 01:38

## 2023-01-01 RX ADMIN — CALCIUM CHLORIDE, MAGNESIUM CHLORIDE, DEXTROSE MONOHYDRATE, LACTIC ACID, SODIUM CHLORIDE, SODIUM BICARBONATE AND POTASSIUM CHLORIDE: 3.68; 3.05; 22; 5.4; 6.46; 3.09; .314 INJECTION INTRAVENOUS at 12:17

## 2023-01-01 RX ADMIN — EPOETIN ALFA-EPBX 10000 UNITS: 10000 INJECTION, SOLUTION INTRAVENOUS; SUBCUTANEOUS at 22:13

## 2023-01-01 RX ADMIN — CALCIUM CHLORIDE, MAGNESIUM CHLORIDE, DEXTROSE MONOHYDRATE, LACTIC ACID, SODIUM CHLORIDE, SODIUM BICARBONATE AND POTASSIUM CHLORIDE: 3.68; 3.05; 22; 5.4; 6.46; 3.09; .314 INJECTION INTRAVENOUS at 14:56

## 2023-01-01 RX ADMIN — ALBUMIN (HUMAN) 12.5 G: 0.25 INJECTION, SOLUTION INTRAVENOUS at 00:13

## 2023-01-01 RX ADMIN — ALBUMIN (HUMAN) 12.5 G: 0.25 INJECTION, SOLUTION INTRAVENOUS at 08:48

## 2023-01-01 RX ADMIN — CALCIUM CHLORIDE, MAGNESIUM CHLORIDE, DEXTROSE MONOHYDRATE, LACTIC ACID, SODIUM CHLORIDE, SODIUM BICARBONATE AND POTASSIUM CHLORIDE: 3.68; 3.05; 22; 5.4; 6.46; 3.09; .314 INJECTION INTRAVENOUS at 22:50

## 2023-01-01 RX ADMIN — MUPIROCIN 1 G: 20 OINTMENT TOPICAL at 18:11

## 2023-01-01 RX ADMIN — CALCIUM CHLORIDE, MAGNESIUM CHLORIDE, DEXTROSE MONOHYDRATE, LACTIC ACID, SODIUM CHLORIDE, SODIUM BICARBONATE AND POTASSIUM CHLORIDE 1600 ML/HR: 3.68; 3.05; 22; 5.4; 6.46; 3.09; .314 INJECTION INTRAVENOUS at 09:46

## 2023-01-01 RX ADMIN — PANTOPRAZOLE SODIUM 40 MG: 40 TABLET, DELAYED RELEASE ORAL at 10:36

## 2023-01-01 RX ADMIN — DEXMEDETOMIDINE HYDROCHLORIDE 0.8 MCG/KG/HR: 4 INJECTION, SOLUTION INTRAVENOUS at 15:32

## 2023-01-01 RX ADMIN — SODIUM CHLORIDE, PRESERVATIVE FREE 10 ML: 5 INJECTION INTRAVENOUS at 05:00

## 2023-01-01 RX ADMIN — METOPROLOL SUCCINATE 12.5 MG: 25 TABLET, EXTENDED RELEASE ORAL at 09:52

## 2023-01-01 RX ADMIN — FLUCONAZOLE IN SODIUM CHLORIDE 400 MG: 2 INJECTION, SOLUTION INTRAVENOUS at 12:51

## 2023-01-01 RX ADMIN — Medication 1 MG/HR: at 01:41

## 2023-01-01 RX ADMIN — AMIODARONE HYDROCHLORIDE 400 MG: 200 TABLET ORAL at 20:24

## 2023-01-01 RX ADMIN — DEXMEDETOMIDINE HYDROCHLORIDE 1.5 MCG/KG/HR: 4 INJECTION, SOLUTION INTRAVENOUS at 02:41

## 2023-01-01 RX ADMIN — ASPIRIN 81 MG CHEWABLE TABLET 81 MG: 81 TABLET CHEWABLE at 08:50

## 2023-01-01 RX ADMIN — CALCIUM CHLORIDE, MAGNESIUM CHLORIDE, DEXTROSE MONOHYDRATE, LACTIC ACID, SODIUM CHLORIDE, SODIUM BICARBONATE AND POTASSIUM CHLORIDE 1700 ML/HR: 3.68; 3.05; 22; 5.4; 6.46; 3.09; .314 INJECTION INTRAVENOUS at 11:47

## 2023-01-01 RX ADMIN — NEOMYCIN SULFATE, POLYMYXIN B SULFATE, AND DEXAMETHASONE: 3.5; 10000; 1 OINTMENT OPHTHALMIC at 17:18

## 2023-01-01 RX ADMIN — CALCIUM CHLORIDE, MAGNESIUM CHLORIDE, DEXTROSE MONOHYDRATE, LACTIC ACID, SODIUM CHLORIDE, SODIUM BICARBONATE AND POTASSIUM CHLORIDE: 3.68; 3.05; 22; 5.4; 6.46; 3.09; .314 INJECTION INTRAVENOUS at 05:34

## 2023-01-01 RX ADMIN — METRONIDAZOLE 500 MG: 500 INJECTION, SOLUTION INTRAVENOUS at 21:04

## 2023-01-01 RX ADMIN — CALCIUM CHLORIDE, MAGNESIUM CHLORIDE, DEXTROSE MONOHYDRATE, LACTIC ACID, SODIUM CHLORIDE, SODIUM BICARBONATE AND POTASSIUM CHLORIDE: 5.15; 2.03; 22; 5.4; 6.46; 3.09; .157 INJECTION INTRAVENOUS at 20:45

## 2023-01-01 RX ADMIN — NEOMYCIN SULFATE, POLYMYXIN B SULFATE, AND DEXAMETHASONE: 3.5; 10000; 1 OINTMENT OPHTHALMIC at 09:13

## 2023-01-01 RX ADMIN — NEOMYCIN SULFATE, POLYMYXIN B SULFATE, AND DEXAMETHASONE: 3.5; 10000; 1 OINTMENT OPHTHALMIC at 08:41

## 2023-01-01 RX ADMIN — Medication 2 UNITS: at 10:02

## 2023-01-01 RX ADMIN — PIPERACILLIN AND TAZOBACTAM 3.38 G: 3; .375 INJECTION, POWDER, FOR SOLUTION INTRAVENOUS at 13:31

## 2023-01-01 RX ADMIN — CALCIUM CHLORIDE, MAGNESIUM CHLORIDE, DEXTROSE MONOHYDRATE, LACTIC ACID, SODIUM CHLORIDE, SODIUM BICARBONATE AND POTASSIUM CHLORIDE: 3.68; 3.05; 22; 5.4; 6.46; 3.09; .314 INJECTION INTRAVENOUS at 10:08

## 2023-01-01 RX ADMIN — DEXTROSE MONOHYDRATE 50 ML: 100 INJECTION, SOLUTION INTRAVENOUS at 09:53

## 2023-01-01 RX ADMIN — WHITE PETROLATUM 57.7 %-MINERAL OIL 31.9 % EYE OINTMENT: at 12:00

## 2023-01-01 RX ADMIN — MEROPENEM 1 G: 1 INJECTION, POWDER, FOR SOLUTION INTRAVENOUS at 05:04

## 2023-01-01 RX ADMIN — SODIUM CHLORIDE, PRESERVATIVE FREE 40 MG: 5 INJECTION INTRAVENOUS at 09:29

## 2023-01-01 RX ADMIN — TRAZODONE HYDROCHLORIDE 50 MG: 50 TABLET ORAL at 22:58

## 2023-01-01 RX ADMIN — ALBUMIN (HUMAN) 12.5 G: 0.25 INJECTION, SOLUTION INTRAVENOUS at 06:04

## 2023-01-01 RX ADMIN — Medication 2 UNITS: at 06:25

## 2023-01-01 RX ADMIN — ASPIRIN 81 MG: 81 TABLET, COATED ORAL at 10:36

## 2023-01-01 RX ADMIN — Medication 7 UNITS: at 12:26

## 2023-01-01 RX ADMIN — MILRINONE LACTATE IN DEXTROSE 0.2 MCG/KG/MIN: 200 INJECTION, SOLUTION INTRAVENOUS at 18:37

## 2023-01-01 RX ADMIN — APIXABAN 10 MG: 5 TABLET, FILM COATED ORAL at 17:28

## 2023-01-01 RX ADMIN — CALCIUM CHLORIDE, MAGNESIUM CHLORIDE, DEXTROSE MONOHYDRATE, LACTIC ACID, SODIUM CHLORIDE, SODIUM BICARBONATE AND POTASSIUM CHLORIDE: 3.68; 3.05; 22; 5.4; 6.46; 3.09; .314 INJECTION INTRAVENOUS at 18:50

## 2023-01-01 RX ADMIN — IRON SUCROSE 200 MG: 20 INJECTION, SOLUTION INTRAVENOUS at 09:13

## 2023-01-01 RX ADMIN — COLCHICINE 0.6 MG: 0.6 TABLET, FILM COATED ORAL at 08:23

## 2023-01-01 RX ADMIN — SODIUM CHLORIDE, PRESERVATIVE FREE 10 ML: 5 INJECTION INTRAVENOUS at 06:33

## 2023-01-01 RX ADMIN — ALBUMIN (HUMAN) 12.5 G: 12.5 INJECTION, SOLUTION INTRAVENOUS at 15:09

## 2023-01-01 RX ADMIN — APIXABAN 5 MG: 5 TABLET, FILM COATED ORAL at 18:58

## 2023-01-01 RX ADMIN — ALBUMIN (HUMAN) 12.5 G: 12.5 INJECTION, SOLUTION INTRAVENOUS at 00:57

## 2023-01-01 RX ADMIN — WARFARIN SODIUM 1 MG: 1 TABLET ORAL at 17:22

## 2023-01-01 RX ADMIN — SODIUM CHLORIDE, PRESERVATIVE FREE 5 ML: 5 INJECTION INTRAVENOUS at 13:38

## 2023-01-01 RX ADMIN — DEXMEDETOMIDINE HYDROCHLORIDE 0.8 MCG/KG/HR: 4 INJECTION, SOLUTION INTRAVENOUS at 15:15

## 2023-01-01 RX ADMIN — CALCIUM CHLORIDE, MAGNESIUM CHLORIDE, DEXTROSE MONOHYDRATE, LACTIC ACID, SODIUM CHLORIDE, SODIUM BICARBONATE AND POTASSIUM CHLORIDE: 3.68; 3.05; 22; 5.4; 6.46; 3.09; .314 INJECTION INTRAVENOUS at 21:03

## 2023-01-01 RX ADMIN — MAGNESIUM SULFATE HEPTAHYDRATE 2 G: 40 INJECTION, SOLUTION INTRAVENOUS at 14:34

## 2023-01-01 RX ADMIN — ALBUMIN (HUMAN) 12.5 G: 0.25 INJECTION, SOLUTION INTRAVENOUS at 10:19

## 2023-01-01 RX ADMIN — SODIUM CHLORIDE 9 ML/HR: 9 INJECTION, SOLUTION INTRAVENOUS at 02:00

## 2023-01-01 RX ADMIN — ONDANSETRON HYDROCHLORIDE 4 MG: 2 SOLUTION INTRAMUSCULAR; INTRAVENOUS at 17:30

## 2023-01-01 RX ADMIN — ASPIRIN 81 MG: 81 TABLET, COATED ORAL at 08:31

## 2023-01-01 RX ADMIN — ACETAMINOPHEN 650 MG: 325 TABLET ORAL at 18:14

## 2023-01-01 RX ADMIN — ALBUMIN (HUMAN) 12.5 G: 0.25 INJECTION, SOLUTION INTRAVENOUS at 12:34

## 2023-01-01 RX ADMIN — WARFARIN SODIUM 1 MG: 1 TABLET ORAL at 16:27

## 2023-01-01 RX ADMIN — HYDROMORPHONE HYDROCHLORIDE 0.5 MG: 1 INJECTION, SOLUTION INTRAMUSCULAR; INTRAVENOUS; SUBCUTANEOUS at 11:17

## 2023-01-01 RX ADMIN — Medication 10 UNITS: at 09:30

## 2023-01-01 RX ADMIN — ALTEPLASE 3 MG: 2.2 INJECTION, POWDER, LYOPHILIZED, FOR SOLUTION INTRAVENOUS at 21:05

## 2023-01-01 RX ADMIN — IPRATROPIUM BROMIDE AND ALBUTEROL SULFATE 3 ML: .5; 3 SOLUTION RESPIRATORY (INHALATION) at 13:17

## 2023-01-01 RX ADMIN — ALBUMIN (HUMAN) 12.5 G: 0.25 INJECTION, SOLUTION INTRAVENOUS at 08:32

## 2023-01-01 RX ADMIN — SODIUM CHLORIDE 6 ML/HR: 9 INJECTION, SOLUTION INTRAVENOUS at 06:48

## 2023-01-01 RX ADMIN — MUPIROCIN: 20 OINTMENT TOPICAL at 09:00

## 2023-01-01 RX ADMIN — METRONIDAZOLE 500 MG: 500 INJECTION, SOLUTION INTRAVENOUS at 09:00

## 2023-01-01 RX ADMIN — ALBUMIN (HUMAN) 25 G: 0.25 INJECTION, SOLUTION INTRAVENOUS at 04:08

## 2023-01-01 RX ADMIN — Medication 6 UNITS: at 12:29

## 2023-01-01 RX ADMIN — CALCIUM CHLORIDE, MAGNESIUM CHLORIDE, DEXTROSE MONOHYDRATE, LACTIC ACID, SODIUM CHLORIDE, SODIUM BICARBONATE AND POTASSIUM CHLORIDE: 3.68; 3.05; 22; 5.4; 6.46; 3.09; .314 INJECTION INTRAVENOUS at 02:32

## 2023-01-01 RX ADMIN — ALBUMIN (HUMAN) 25 G: 12.5 INJECTION, SOLUTION INTRAVENOUS at 15:32

## 2023-01-01 RX ADMIN — WHITE PETROLATUM 57.7 %-MINERAL OIL 31.9 % EYE OINTMENT: at 22:01

## 2023-01-01 RX ADMIN — Medication 5 UNITS: at 12:54

## 2023-01-01 RX ADMIN — MEROPENEM 1 G: 1 INJECTION, POWDER, FOR SOLUTION INTRAVENOUS at 17:09

## 2023-01-01 RX ADMIN — OXYCODONE HYDROCHLORIDE 5 MG: 5 TABLET ORAL at 02:33

## 2023-01-01 RX ADMIN — Medication 7 UNITS: at 12:46

## 2023-01-01 RX ADMIN — ALBUMIN (HUMAN) 12.5 G: 0.25 INJECTION, SOLUTION INTRAVENOUS at 20:33

## 2023-01-01 RX ADMIN — CALCIUM CHLORIDE, MAGNESIUM CHLORIDE, DEXTROSE MONOHYDRATE, LACTIC ACID, SODIUM CHLORIDE, SODIUM BICARBONATE AND POTASSIUM CHLORIDE: 3.68; 3.05; 22; 5.4; 6.46; 3.09; .314 INJECTION INTRAVENOUS at 19:03

## 2023-01-01 RX ADMIN — DOXYCYCLINE 100 MG: 100 INJECTION, POWDER, LYOPHILIZED, FOR SOLUTION INTRAVENOUS at 01:28

## 2023-01-01 RX ADMIN — CALCIUM CHLORIDE, MAGNESIUM CHLORIDE, DEXTROSE MONOHYDRATE, LACTIC ACID, SODIUM CHLORIDE, SODIUM BICARBONATE AND POTASSIUM CHLORIDE: 3.68; 3.05; 22; 5.4; 6.46; 3.09; .314 INJECTION INTRAVENOUS at 02:37

## 2023-01-01 RX ADMIN — MAGNESIUM OXIDE 400 MG (241.3 MG MAGNESIUM) TABLET 400 MG: TABLET at 10:44

## 2023-01-01 RX ADMIN — DEXMEDETOMIDINE HYDROCHLORIDE 0.8 MCG/KG/HR: 4 INJECTION, SOLUTION INTRAVENOUS at 07:22

## 2023-01-01 RX ADMIN — CALCIUM CHLORIDE, MAGNESIUM CHLORIDE, DEXTROSE MONOHYDRATE, LACTIC ACID, SODIUM CHLORIDE, SODIUM BICARBONATE AND POTASSIUM CHLORIDE: 5.15; 2.03; 22; 5.4; 6.46; 3.09; .157 INJECTION INTRAVENOUS at 09:24

## 2023-01-01 RX ADMIN — SODIUM CHLORIDE, PRESERVATIVE FREE 40 MG: 5 INJECTION INTRAVENOUS at 08:41

## 2023-01-01 RX ADMIN — Medication: at 19:59

## 2023-01-01 RX ADMIN — MEROPENEM 1 G: 1 INJECTION, POWDER, FOR SOLUTION INTRAVENOUS at 18:08

## 2023-01-01 RX ADMIN — FUROSEMIDE 40 MG: 10 INJECTION, SOLUTION INTRAMUSCULAR; INTRAVENOUS at 09:53

## 2023-01-01 RX ADMIN — ALBUMIN (HUMAN) 12.5 G: 0.25 INJECTION, SOLUTION INTRAVENOUS at 14:06

## 2023-01-01 RX ADMIN — ASPIRIN 81 MG CHEWABLE TABLET 81 MG: 81 TABLET CHEWABLE at 10:28

## 2023-01-01 RX ADMIN — ASPIRIN 81 MG CHEWABLE TABLET 81 MG: 81 TABLET CHEWABLE at 08:13

## 2023-01-01 RX ADMIN — HEPARIN SODIUM 400 UNITS/HR: 10000 INJECTION, SOLUTION INTRAVENOUS at 13:14

## 2023-01-01 RX ADMIN — SODIUM CHLORIDE 4.1 UNITS/HR: 9 INJECTION, SOLUTION INTRAVENOUS at 00:27

## 2023-01-01 RX ADMIN — ALBUMIN (HUMAN) 12.5 G: 0.25 INJECTION, SOLUTION INTRAVENOUS at 12:00

## 2023-01-01 RX ADMIN — FENTANYL CITRATE 125 MCG/HR: 0.05 INJECTION, SOLUTION INTRAMUSCULAR; INTRAVENOUS at 08:27

## 2023-01-01 RX ADMIN — ACETAMINOPHEN 650 MG: 325 TABLET ORAL at 20:52

## 2023-01-01 RX ADMIN — SODIUM CHLORIDE, PRESERVATIVE FREE 10 ML: 5 INJECTION INTRAVENOUS at 23:03

## 2023-01-01 RX ADMIN — CALCIUM CHLORIDE, MAGNESIUM CHLORIDE, DEXTROSE MONOHYDRATE, LACTIC ACID, SODIUM CHLORIDE, SODIUM BICARBONATE AND POTASSIUM CHLORIDE 1700 ML/HR: 3.68; 3.05; 22; 5.4; 6.46; 3.09; .314 INJECTION INTRAVENOUS at 14:07

## 2023-01-01 RX ADMIN — SODIUM CHLORIDE, PRESERVATIVE FREE 1 G: 5 INJECTION INTRAVENOUS at 12:23

## 2023-01-01 RX ADMIN — COLCHICINE 0.6 MG: 0.6 TABLET, FILM COATED ORAL at 09:08

## 2023-01-01 RX ADMIN — VANCOMYCIN HYDROCHLORIDE 750 MG: 750 INJECTION, POWDER, LYOPHILIZED, FOR SOLUTION INTRAVENOUS at 14:24

## 2023-01-01 RX ADMIN — METRONIDAZOLE 500 MG: 500 INJECTION, SOLUTION INTRAVENOUS at 09:11

## 2023-01-01 RX ADMIN — Medication 10 UNITS: at 10:48

## 2023-01-01 RX ADMIN — POTASSIUM CHLORIDE 20 MEQ: 750 TABLET, FILM COATED, EXTENDED RELEASE ORAL at 08:54

## 2023-01-01 RX ADMIN — ALBUMIN (HUMAN) 12.5 G: 0.25 INJECTION, SOLUTION INTRAVENOUS at 18:15

## 2023-01-01 RX ADMIN — PROPOFOL 40 MCG/KG/MIN: 10 INJECTION, EMULSION INTRAVENOUS at 16:13

## 2023-01-01 RX ADMIN — SODIUM CHLORIDE, PRESERVATIVE FREE 10 ML: 5 INJECTION INTRAVENOUS at 06:31

## 2023-01-01 RX ADMIN — WHITE PETROLATUM 57.7 %-MINERAL OIL 31.9 % EYE OINTMENT: at 09:37

## 2023-01-01 RX ADMIN — SODIUM CHLORIDE, PRESERVATIVE FREE 10 ML: 5 INJECTION INTRAVENOUS at 13:46

## 2023-01-01 RX ADMIN — ALBUMIN (HUMAN) 12.5 G: 0.25 INJECTION, SOLUTION INTRAVENOUS at 01:42

## 2023-01-01 RX ADMIN — MEROPENEM 1 G: 1 INJECTION, POWDER, FOR SOLUTION INTRAVENOUS at 05:45

## 2023-01-01 RX ADMIN — METRONIDAZOLE 500 MG: 500 INJECTION, SOLUTION INTRAVENOUS at 08:27

## 2023-01-01 RX ADMIN — Medication 6 UNITS: at 12:20

## 2023-01-01 RX ADMIN — Medication 1 CAPSULE: at 08:32

## 2023-01-01 RX ADMIN — FUROSEMIDE 40 MG: 10 INJECTION, SOLUTION INTRAMUSCULAR; INTRAVENOUS at 18:58

## 2023-01-01 RX ADMIN — TAMSULOSIN HYDROCHLORIDE 0.4 MG: 0.4 CAPSULE ORAL at 08:18

## 2023-01-01 RX ADMIN — SODIUM CHLORIDE, PRESERVATIVE FREE 10 ML: 5 INJECTION INTRAVENOUS at 22:20

## 2023-01-01 RX ADMIN — DEXMEDETOMIDINE HYDROCHLORIDE 1 MCG/KG/HR: 4 INJECTION, SOLUTION INTRAVENOUS at 02:35

## 2023-01-01 RX ADMIN — LABETALOL HYDROCHLORIDE 5 MG: 5 INJECTION INTRAVENOUS at 11:27

## 2023-01-01 RX ADMIN — CALCIUM CHLORIDE, MAGNESIUM CHLORIDE, DEXTROSE MONOHYDRATE, LACTIC ACID, SODIUM CHLORIDE, SODIUM BICARBONATE AND POTASSIUM CHLORIDE: 3.68; 3.05; 22; 5.4; 6.46; 3.09; .314 INJECTION INTRAVENOUS at 14:28

## 2023-01-01 RX ADMIN — SODIUM CHLORIDE, PRESERVATIVE FREE 10 ML: 5 INJECTION INTRAVENOUS at 13:08

## 2023-01-01 RX ADMIN — WHITE PETROLATUM 57.7 %-MINERAL OIL 31.9 % EYE OINTMENT: at 08:33

## 2023-01-01 RX ADMIN — CALCIUM CHLORIDE, MAGNESIUM CHLORIDE, DEXTROSE MONOHYDRATE, LACTIC ACID, SODIUM CHLORIDE, SODIUM BICARBONATE AND POTASSIUM CHLORIDE: 3.68; 3.05; 22; 5.4; 6.46; 3.09; .314 INJECTION INTRAVENOUS at 22:27

## 2023-01-01 RX ADMIN — IPRATROPIUM BROMIDE 2 SPRAY: 21 SPRAY, METERED NASAL at 08:17

## 2023-01-01 RX ADMIN — SODIUM CHLORIDE, PRESERVATIVE FREE 10 ML: 5 INJECTION INTRAVENOUS at 21:33

## 2023-01-01 RX ADMIN — Medication 16 UNITS: at 21:28

## 2023-01-01 RX ADMIN — PANTOPRAZOLE SODIUM 40 MG: 40 TABLET, DELAYED RELEASE ORAL at 06:32

## 2023-01-01 RX ADMIN — SODIUM CHLORIDE 2.3 UNITS/HR: 9 INJECTION, SOLUTION INTRAVENOUS at 19:59

## 2023-01-01 RX ADMIN — Medication 4 UNITS: at 21:37

## 2023-01-01 RX ADMIN — DEXMEDETOMIDINE HYDROCHLORIDE 0.8 MCG/KG/HR: 4 INJECTION, SOLUTION INTRAVENOUS at 17:46

## 2023-01-01 RX ADMIN — Medication 8 UNITS: at 18:38

## 2023-01-01 RX ADMIN — ALBUMIN (HUMAN) 12.5 G: 0.25 INJECTION, SOLUTION INTRAVENOUS at 13:49

## 2023-01-01 RX ADMIN — BUMETANIDE 0.5 MG: 0.25 INJECTION, SOLUTION INTRAMUSCULAR; INTRAVENOUS at 14:31

## 2023-01-01 RX ADMIN — Medication 5 UNITS: at 12:23

## 2023-01-01 RX ADMIN — CALCIUM CHLORIDE, MAGNESIUM CHLORIDE, DEXTROSE MONOHYDRATE, LACTIC ACID, SODIUM CHLORIDE, SODIUM BICARBONATE AND POTASSIUM CHLORIDE: 3.68; 3.05; 22; 5.4; 6.46; 3.09; .314 INJECTION INTRAVENOUS at 14:24

## 2023-01-01 RX ADMIN — CALCITONIN SALMON 256 INT'L UNITS: 200 INJECTION, SOLUTION INTRAMUSCULAR; SUBCUTANEOUS at 05:20

## 2023-01-01 RX ADMIN — CALCIUM CHLORIDE, MAGNESIUM CHLORIDE, DEXTROSE MONOHYDRATE, LACTIC ACID, SODIUM CHLORIDE, SODIUM BICARBONATE AND POTASSIUM CHLORIDE: 5.15; 2.03; 22; 5.4; 6.46; 3.09; .157 INJECTION INTRAVENOUS at 23:59

## 2023-01-01 RX ADMIN — ADENOSINE 6 MG: 3 INJECTION INTRAVENOUS at 20:07

## 2023-01-01 RX ADMIN — POTASSIUM CHLORIDE 20 MEQ: 29.8 INJECTION, SOLUTION INTRAVENOUS at 06:42

## 2023-01-01 RX ADMIN — CALCIUM CHLORIDE, MAGNESIUM CHLORIDE, DEXTROSE MONOHYDRATE, LACTIC ACID, SODIUM CHLORIDE, SODIUM BICARBONATE AND POTASSIUM CHLORIDE: 5.15; 2.03; 22; 5.4; 6.46; 3.09; .157 INJECTION INTRAVENOUS at 20:47

## 2023-01-01 RX ADMIN — ALBUMIN (HUMAN) 12.5 G: 0.25 INJECTION, SOLUTION INTRAVENOUS at 11:16

## 2023-01-01 RX ADMIN — ALBUMIN (HUMAN) 12.5 G: 0.25 INJECTION, SOLUTION INTRAVENOUS at 05:36

## 2023-01-01 RX ADMIN — METRONIDAZOLE 500 MG: 500 INJECTION, SOLUTION INTRAVENOUS at 08:23

## 2023-01-01 RX ADMIN — Medication: at 18:22

## 2023-01-01 RX ADMIN — WARFARIN SODIUM 1 MG: 1 TABLET ORAL at 18:34

## 2023-01-01 RX ADMIN — HEPARIN SODIUM 1700 UNITS: 1000 INJECTION INTRAVENOUS; SUBCUTANEOUS at 07:36

## 2023-01-01 RX ADMIN — ALBUMIN (HUMAN) 12.5 G: 0.25 INJECTION, SOLUTION INTRAVENOUS at 08:17

## 2023-01-01 RX ADMIN — NEOMYCIN SULFATE, POLYMYXIN B SULFATE, AND DEXAMETHASONE: 3.5; 10000; 1 OINTMENT OPHTHALMIC at 19:30

## 2023-01-01 RX ADMIN — VASOPRESSIN 0.01 UNITS/MIN: 20 INJECTION INTRAVENOUS at 04:13

## 2023-01-01 RX ADMIN — SODIUM CHLORIDE 10 ML/HR: 4.5 INJECTION, SOLUTION INTRAVENOUS at 00:53

## 2023-01-01 RX ADMIN — CALCIUM CHLORIDE, MAGNESIUM CHLORIDE, DEXTROSE MONOHYDRATE, LACTIC ACID, SODIUM CHLORIDE, SODIUM BICARBONATE AND POTASSIUM CHLORIDE: 5.15; 2.03; 22; 5.4; 6.46; 3.09; .157 INJECTION INTRAVENOUS at 06:30

## 2023-01-01 RX ADMIN — HEPARIN SODIUM 1700 UNITS: 1000 INJECTION INTRAVENOUS; SUBCUTANEOUS at 08:19

## 2023-01-01 RX ADMIN — HYDROMORPHONE HYDROCHLORIDE 1 MG: 1 INJECTION, SOLUTION INTRAMUSCULAR; INTRAVENOUS; SUBCUTANEOUS at 17:08

## 2023-01-01 RX ADMIN — DEXMEDETOMIDINE HYDROCHLORIDE 0.6 MCG/KG/HR: 4 INJECTION, SOLUTION INTRAVENOUS at 18:36

## 2023-01-01 RX ADMIN — Medication 8 UNITS: at 11:53

## 2023-01-01 RX ADMIN — Medication 3 UNITS: at 17:28

## 2023-01-01 RX ADMIN — CALCIUM CHLORIDE, MAGNESIUM CHLORIDE, DEXTROSE MONOHYDRATE, LACTIC ACID, SODIUM CHLORIDE, SODIUM BICARBONATE AND POTASSIUM CHLORIDE: 3.68; 3.05; 22; 5.4; 6.46; 3.09; .314 INJECTION INTRAVENOUS at 14:50

## 2023-01-01 RX ADMIN — Medication 500 UNITS: at 08:47

## 2023-01-01 RX ADMIN — PIPERACILLIN AND TAZOBACTAM 3.38 G: 3; .375 INJECTION, POWDER, FOR SOLUTION INTRAVENOUS at 21:30

## 2023-01-01 RX ADMIN — Medication: at 22:20

## 2023-01-01 RX ADMIN — LEVETIRACETAM 250 MG: 100 INJECTION INTRAVENOUS at 20:21

## 2023-01-01 RX ADMIN — PIPERACILLIN AND TAZOBACTAM 4.5 G: 4; .5 INJECTION, POWDER, FOR SOLUTION INTRAVENOUS at 07:14

## 2023-01-01 RX ADMIN — OXYCODONE HYDROCHLORIDE 5 MG: 5 TABLET ORAL at 08:41

## 2023-01-01 RX ADMIN — POLYETHYLENE GLYCOL 3350 17 G: 17 POWDER, FOR SOLUTION ORAL at 10:48

## 2023-01-01 RX ADMIN — PIPERACILLIN AND TAZOBACTAM 3.38 G: 3; .375 INJECTION, POWDER, FOR SOLUTION INTRAVENOUS at 05:00

## 2023-01-01 RX ADMIN — SENNOSIDES AND DOCUSATE SODIUM 1 TABLET: 50; 8.6 TABLET ORAL at 08:39

## 2023-01-01 RX ADMIN — Medication: at 08:29

## 2023-01-01 RX ADMIN — ALBUMIN (HUMAN) 12.5 G: 0.25 INJECTION, SOLUTION INTRAVENOUS at 05:14

## 2023-01-01 RX ADMIN — SODIUM CHLORIDE, PRESERVATIVE FREE 10 ML: 5 INJECTION INTRAVENOUS at 21:31

## 2023-01-01 RX ADMIN — COLCHICINE 0.6 MG: 0.6 TABLET, FILM COATED ORAL at 08:25

## 2023-01-01 RX ADMIN — HEPARIN SODIUM 12 UNITS/KG/HR: 10000 INJECTION, SOLUTION INTRAVENOUS at 15:04

## 2023-01-01 RX ADMIN — ALBUMIN (HUMAN) 12.5 G: 0.25 INJECTION, SOLUTION INTRAVENOUS at 17:56

## 2023-01-01 RX ADMIN — ASPIRIN 81 MG CHEWABLE TABLET 81 MG: 81 TABLET CHEWABLE at 08:40

## 2023-01-01 RX ADMIN — POLYETHYLENE GLYCOL 3350 17 G: 17 POWDER, FOR SOLUTION ORAL at 10:19

## 2023-01-01 RX ADMIN — ALBUMIN (HUMAN) 12.5 G: 0.25 INJECTION, SOLUTION INTRAVENOUS at 12:10

## 2023-01-01 RX ADMIN — DEXTROSE MONOHYDRATE 50 ML: 100 INJECTION, SOLUTION INTRAVENOUS at 01:21

## 2023-01-01 RX ADMIN — DEXMEDETOMIDINE HYDROCHLORIDE 1 MCG/KG/HR: 4 INJECTION, SOLUTION INTRAVENOUS at 22:19

## 2023-01-01 RX ADMIN — METRONIDAZOLE 500 MG: 500 INJECTION, SOLUTION INTRAVENOUS at 08:46

## 2023-01-01 RX ADMIN — CALCIUM CHLORIDE, MAGNESIUM CHLORIDE, DEXTROSE MONOHYDRATE, LACTIC ACID, SODIUM CHLORIDE, SODIUM BICARBONATE AND POTASSIUM CHLORIDE 1700 ML/HR: 3.68; 3.05; 22; 5.4; 6.46; 3.09; .314 INJECTION INTRAVENOUS at 08:49

## 2023-01-01 RX ADMIN — AMIODARONE HYDROCHLORIDE 400 MG: 200 TABLET ORAL at 17:03

## 2023-01-01 RX ADMIN — Medication: at 08:54

## 2023-01-01 RX ADMIN — Medication: at 18:29

## 2023-01-01 RX ADMIN — LEVETIRACETAM 500 MG: 100 INJECTION INTRAVENOUS at 21:06

## 2023-01-01 RX ADMIN — APIXABAN 5 MG: 5 TABLET, FILM COATED ORAL at 08:42

## 2023-01-01 RX ADMIN — DOBUTAMINE HYDROCHLORIDE 7 MCG/KG/MIN: 200 INJECTION INTRAVENOUS at 09:59

## 2023-01-01 RX ADMIN — CALCIUM CHLORIDE, MAGNESIUM CHLORIDE, DEXTROSE MONOHYDRATE, LACTIC ACID, SODIUM CHLORIDE, SODIUM BICARBONATE AND POTASSIUM CHLORIDE: 3.68; 3.05; 22; 5.4; 6.46; 3.09; .314 INJECTION INTRAVENOUS at 02:39

## 2023-01-01 RX ADMIN — SODIUM CHLORIDE, PRESERVATIVE FREE 10 ML: 5 INJECTION INTRAVENOUS at 05:14

## 2023-01-01 RX ADMIN — ALBUMIN (HUMAN) 12.5 G: 0.25 INJECTION, SOLUTION INTRAVENOUS at 10:37

## 2023-01-01 RX ADMIN — HEPARIN SODIUM 1632 UNITS: 1000 INJECTION INTRAVENOUS; SUBCUTANEOUS at 00:33

## 2023-01-01 RX ADMIN — MEROPENEM 1 G: 1 INJECTION, POWDER, FOR SOLUTION INTRAVENOUS at 17:47

## 2023-01-01 RX ADMIN — Medication 1 CAPSULE: at 08:49

## 2023-01-01 RX ADMIN — ROSUVASTATIN 20 MG: 10 TABLET, FILM COATED ORAL at 22:19

## 2023-01-01 RX ADMIN — LEVETIRACETAM 500 MG: 100 INJECTION INTRAVENOUS at 09:30

## 2023-01-01 RX ADMIN — CALCIUM CHLORIDE, MAGNESIUM CHLORIDE, DEXTROSE MONOHYDRATE, LACTIC ACID, SODIUM CHLORIDE, SODIUM BICARBONATE AND POTASSIUM CHLORIDE: 3.68; 3.05; 22; 5.4; 6.46; 3.09; .314 INJECTION INTRAVENOUS at 16:08

## 2023-01-01 RX ADMIN — PIPERACILLIN AND TAZOBACTAM 3.38 G: 3; .375 INJECTION, POWDER, FOR SOLUTION INTRAVENOUS at 04:47

## 2023-01-01 RX ADMIN — ALTEPLASE 3 MG: 2.2 INJECTION, POWDER, LYOPHILIZED, FOR SOLUTION INTRAVENOUS at 23:57

## 2023-01-01 RX ADMIN — SENNOSIDES AND DOCUSATE SODIUM 1 TABLET: 50; 8.6 TABLET ORAL at 08:28

## 2023-01-01 RX ADMIN — ASPIRIN 81 MG CHEWABLE TABLET 81 MG: 81 TABLET CHEWABLE at 08:32

## 2023-01-01 RX ADMIN — SODIUM CHLORIDE, PRESERVATIVE FREE 10 ML: 5 INJECTION INTRAVENOUS at 16:46

## 2023-01-01 RX ADMIN — HEPARIN SODIUM 13 UNITS/KG/HR: 10000 INJECTION, SOLUTION INTRAVENOUS at 07:59

## 2023-01-01 RX ADMIN — CALCIUM CHLORIDE, MAGNESIUM CHLORIDE, DEXTROSE MONOHYDRATE, LACTIC ACID, SODIUM CHLORIDE, SODIUM BICARBONATE AND POTASSIUM CHLORIDE: 3.68; 3.05; 22; 5.4; 6.46; 3.09; .314 INJECTION INTRAVENOUS at 03:26

## 2023-01-01 RX ADMIN — BUMETANIDE 1 MG: 1 TABLET ORAL at 18:23

## 2023-01-01 RX ADMIN — METRONIDAZOLE 500 MG: 500 INJECTION, SOLUTION INTRAVENOUS at 20:50

## 2023-01-01 RX ADMIN — SODIUM CHLORIDE, PRESERVATIVE FREE 10 ML: 5 INJECTION INTRAVENOUS at 07:37

## 2023-01-01 RX ADMIN — ALBUMIN (HUMAN) 25 G: 0.25 INJECTION, SOLUTION INTRAVENOUS at 13:52

## 2023-01-01 RX ADMIN — DOXYCYCLINE 100 MG: 100 INJECTION, POWDER, LYOPHILIZED, FOR SOLUTION INTRAVENOUS at 12:45

## 2023-01-01 RX ADMIN — POTASSIUM PHOSPHATE, MONOBASIC POTASSIUM PHOSPHATE, DIBASIC: 224; 236 INJECTION, SOLUTION, CONCENTRATE INTRAVENOUS at 09:27

## 2023-01-01 RX ADMIN — SODIUM CHLORIDE, PRESERVATIVE FREE 10 ML: 5 INJECTION INTRAVENOUS at 22:10

## 2023-01-01 RX ADMIN — CALCIUM CHLORIDE, MAGNESIUM CHLORIDE, DEXTROSE MONOHYDRATE, LACTIC ACID, SODIUM CHLORIDE, SODIUM BICARBONATE AND POTASSIUM CHLORIDE: 3.68; 3.05; 22; 5.4; 6.46; 3.09; .314 INJECTION INTRAVENOUS at 16:27

## 2023-01-01 RX ADMIN — ADENOSINE 12 MG: 3 INJECTION INTRAVENOUS at 20:14

## 2023-01-01 RX ADMIN — EPOETIN ALFA-EPBX 10000 UNITS: 10000 INJECTION, SOLUTION INTRAVENOUS; SUBCUTANEOUS at 21:27

## 2023-01-01 RX ADMIN — CALCIUM CHLORIDE, MAGNESIUM CHLORIDE, DEXTROSE MONOHYDRATE, LACTIC ACID, SODIUM CHLORIDE, SODIUM BICARBONATE AND POTASSIUM CHLORIDE: 3.68; 3.05; 22; 5.4; 6.46; 3.09; .314 INJECTION INTRAVENOUS at 09:00

## 2023-01-01 RX ADMIN — BUMETANIDE 1 MG: 0.25 INJECTION, SOLUTION INTRAMUSCULAR; INTRAVENOUS at 13:49

## 2023-01-01 RX ADMIN — HYDROMORPHONE HYDROCHLORIDE 0.5 MG: 1 INJECTION, SOLUTION INTRAMUSCULAR; INTRAVENOUS; SUBCUTANEOUS at 23:22

## 2023-01-01 RX ADMIN — SODIUM CHLORIDE, PRESERVATIVE FREE 10 ML: 5 INJECTION INTRAVENOUS at 22:08

## 2023-01-01 RX ADMIN — CALCIUM CHLORIDE, MAGNESIUM CHLORIDE, DEXTROSE MONOHYDRATE, LACTIC ACID, SODIUM CHLORIDE, SODIUM BICARBONATE AND POTASSIUM CHLORIDE: 3.68; 3.05; 22; 5.4; 6.46; 3.09; .314 INJECTION INTRAVENOUS at 15:23

## 2023-01-01 RX ADMIN — COLCHICINE 0.6 MG: 0.6 TABLET, FILM COATED ORAL at 09:30

## 2023-01-01 RX ADMIN — POTASSIUM CHLORIDE 20 MEQ: 750 TABLET, FILM COATED, EXTENDED RELEASE ORAL at 11:00

## 2023-01-01 RX ADMIN — DEXMEDETOMIDINE HYDROCHLORIDE 0.8 MCG/KG/HR: 4 INJECTION, SOLUTION INTRAVENOUS at 12:30

## 2023-01-01 RX ADMIN — HYDROMORPHONE HYDROCHLORIDE 0.5 MG: 1 INJECTION, SOLUTION INTRAMUSCULAR; INTRAVENOUS; SUBCUTANEOUS at 09:00

## 2023-01-01 RX ADMIN — BUMETANIDE 1 MG: 1 TABLET ORAL at 18:58

## 2023-01-01 RX ADMIN — FENTANYL CITRATE 200 MCG: 50 INJECTION, SOLUTION INTRAMUSCULAR; INTRAVENOUS at 13:29

## 2023-01-01 RX ADMIN — HEPARIN SODIUM 1870 UNITS: 1000 INJECTION INTRAVENOUS; SUBCUTANEOUS at 19:12

## 2023-01-01 RX ADMIN — Medication: at 17:47

## 2023-01-01 RX ADMIN — SODIUM CHLORIDE, PRESERVATIVE FREE 10 ML: 5 INJECTION INTRAVENOUS at 17:29

## 2023-01-01 RX ADMIN — CALCIUM CHLORIDE, MAGNESIUM CHLORIDE, DEXTROSE MONOHYDRATE, LACTIC ACID, SODIUM CHLORIDE, SODIUM BICARBONATE AND POTASSIUM CHLORIDE: 5.15; 2.03; 22; 5.4; 6.46; 3.09; .157 INJECTION INTRAVENOUS at 11:54

## 2023-01-01 RX ADMIN — ASPIRIN 81 MG CHEWABLE TABLET 81 MG: 81 TABLET CHEWABLE at 08:14

## 2023-01-01 RX ADMIN — LEVETIRACETAM 500 MG: 100 INJECTION INTRAVENOUS at 08:31

## 2023-01-01 RX ADMIN — NEOMYCIN SULFATE, POLYMYXIN B SULFATE, AND DEXAMETHASONE: 3.5; 10000; 1 OINTMENT OPHTHALMIC at 08:57

## 2023-01-01 RX ADMIN — VANCOMYCIN HYDROCHLORIDE 1000 MG: 1 INJECTION, POWDER, LYOPHILIZED, FOR SOLUTION INTRAVENOUS at 11:11

## 2023-01-01 RX ADMIN — AMIODARONE HYDROCHLORIDE 400 MG: 200 TABLET ORAL at 18:23

## 2023-01-01 RX ADMIN — ASPIRIN 81 MG: 81 TABLET, COATED ORAL at 08:18

## 2023-01-01 RX ADMIN — Medication 20 UNITS: at 17:46

## 2023-01-01 RX ADMIN — MAGNESIUM OXIDE 400 MG (241.3 MG MAGNESIUM) TABLET 400 MG: TABLET at 08:35

## 2023-01-01 RX ADMIN — Medication 6 UNITS: at 19:58

## 2023-01-01 RX ADMIN — ALBUMIN (HUMAN) 12.5 G: 0.25 INJECTION, SOLUTION INTRAVENOUS at 18:04

## 2023-01-01 RX ADMIN — HYDRALAZINE HYDROCHLORIDE 5 MG: 20 INJECTION INTRAMUSCULAR; INTRAVENOUS at 00:09

## 2023-01-01 RX ADMIN — NEOMYCIN SULFATE, POLYMYXIN B SULFATE, AND DEXAMETHASONE: 3.5; 10000; 1 OINTMENT OPHTHALMIC at 17:50

## 2023-01-01 RX ADMIN — ALBUMIN (HUMAN) 12.5 G: 0.25 INJECTION, SOLUTION INTRAVENOUS at 05:00

## 2023-01-01 RX ADMIN — ALTEPLASE 3 MG: 2.2 INJECTION, POWDER, LYOPHILIZED, FOR SOLUTION INTRAVENOUS at 07:40

## 2023-01-01 RX ADMIN — CALCIUM CHLORIDE, MAGNESIUM CHLORIDE, DEXTROSE MONOHYDRATE, LACTIC ACID, SODIUM CHLORIDE, SODIUM BICARBONATE AND POTASSIUM CHLORIDE: 3.68; 3.05; 22; 5.4; 6.46; 3.09; .314 INJECTION INTRAVENOUS at 11:06

## 2023-01-01 RX ADMIN — TRAZODONE HYDROCHLORIDE 50 MG: 50 TABLET ORAL at 22:12

## 2023-01-01 RX ADMIN — ALBUMIN (HUMAN) 12.5 G: 12.5 INJECTION, SOLUTION INTRAVENOUS at 11:35

## 2023-01-01 RX ADMIN — SODIUM CHLORIDE, PRESERVATIVE FREE 10 ML: 5 INJECTION INTRAVENOUS at 06:32

## 2023-01-01 RX ADMIN — Medication 10 UNITS: at 08:55

## 2023-01-01 RX ADMIN — DEXMEDETOMIDINE HYDROCHLORIDE 1.5 MCG/KG/HR: 4 INJECTION, SOLUTION INTRAVENOUS at 19:32

## 2023-01-01 RX ADMIN — EPOETIN ALFA-EPBX 10000 UNITS: 10000 INJECTION, SOLUTION INTRAVENOUS; SUBCUTANEOUS at 20:58

## 2023-01-01 RX ADMIN — ALBUMIN (HUMAN) 12.5 G: 12.5 INJECTION, SOLUTION INTRAVENOUS at 18:48

## 2023-01-01 RX ADMIN — SODIUM CHLORIDE 250 ML: 9 INJECTION, SOLUTION INTRAVENOUS at 08:36

## 2023-01-01 RX ADMIN — CEFAZOLIN 1 G: 1 INJECTION, POWDER, FOR SOLUTION INTRAMUSCULAR; INTRAVENOUS at 06:07

## 2023-01-01 RX ADMIN — SODIUM CHLORIDE 11 ML/HR: 9 INJECTION, SOLUTION INTRAVENOUS at 03:35

## 2023-01-01 RX ADMIN — ASPIRIN 81 MG: 81 TABLET, CHEWABLE ORAL at 09:42

## 2023-01-01 RX ADMIN — PIPERACILLIN AND TAZOBACTAM 3.38 G: 3; .375 INJECTION, POWDER, FOR SOLUTION INTRAVENOUS at 05:36

## 2023-01-01 RX ADMIN — MIDAZOLAM 1 MG: 1 INJECTION INTRAMUSCULAR; INTRAVENOUS at 04:49

## 2023-01-01 RX ADMIN — PANTOPRAZOLE SODIUM 40 MG: 40 TABLET, DELAYED RELEASE ORAL at 08:18

## 2023-01-01 RX ADMIN — AMIODARONE HYDROCHLORIDE 400 MG: 200 TABLET ORAL at 08:18

## 2023-01-01 RX ADMIN — EPOETIN ALFA-EPBX 10000 UNITS: 10000 INJECTION, SOLUTION INTRAVENOUS; SUBCUTANEOUS at 23:03

## 2023-01-01 RX ADMIN — NEOMYCIN SULFATE, POLYMYXIN B SULFATE, AND DEXAMETHASONE: 3.5; 10000; 1 OINTMENT OPHTHALMIC at 18:35

## 2023-01-01 RX ADMIN — CALCIUM CHLORIDE, MAGNESIUM CHLORIDE, DEXTROSE MONOHYDRATE, LACTIC ACID, SODIUM CHLORIDE, SODIUM BICARBONATE AND POTASSIUM CHLORIDE: 3.68; 3.05; 22; 5.4; 6.46; 3.09; .314 INJECTION INTRAVENOUS at 23:18

## 2023-01-01 RX ADMIN — APIXABAN 10 MG: 5 TABLET, FILM COATED ORAL at 09:13

## 2023-01-01 RX ADMIN — Medication 10 UNITS: at 21:44

## 2023-01-01 RX ADMIN — Medication 4 UNITS: at 21:29

## 2023-01-01 RX ADMIN — SODIUM CHLORIDE, PRESERVATIVE FREE 10 ML: 5 INJECTION INTRAVENOUS at 22:38

## 2023-01-01 RX ADMIN — FENTANYL CITRATE 200 MCG/HR: 0.05 INJECTION, SOLUTION INTRAMUSCULAR; INTRAVENOUS at 02:17

## 2023-01-01 RX ADMIN — ASPIRIN 81 MG: 81 TABLET, COATED ORAL at 08:46

## 2023-01-01 RX ADMIN — ALBUMIN (HUMAN) 12.5 G: 0.25 INJECTION, SOLUTION INTRAVENOUS at 17:42

## 2023-01-01 RX ADMIN — PIPERACILLIN AND TAZOBACTAM 3.38 G: 3; .375 INJECTION, POWDER, FOR SOLUTION INTRAVENOUS at 05:12

## 2023-01-01 RX ADMIN — PIPERACILLIN AND TAZOBACTAM 3.38 G: 3; .375 INJECTION, POWDER, FOR SOLUTION INTRAVENOUS at 13:47

## 2023-01-01 RX ADMIN — PANTOPRAZOLE SODIUM 40 MG: 40 TABLET, DELAYED RELEASE ORAL at 06:48

## 2023-01-01 RX ADMIN — ASPIRIN 81 MG CHEWABLE TABLET 81 MG: 81 TABLET CHEWABLE at 08:48

## 2023-01-01 RX ADMIN — LEVETIRACETAM 500 MG: 100 INJECTION INTRAVENOUS at 08:41

## 2023-01-01 RX ADMIN — DEXMEDETOMIDINE HYDROCHLORIDE 1.3 MCG/KG/HR: 4 INJECTION, SOLUTION INTRAVENOUS at 15:20

## 2023-01-01 RX ADMIN — ALBUMIN (HUMAN) 12.5 G: 0.25 INJECTION, SOLUTION INTRAVENOUS at 19:51

## 2023-01-01 RX ADMIN — LEVETIRACETAM 500 MG: 100 INJECTION, SOLUTION, CONCENTRATE INTRAVENOUS at 01:13

## 2023-01-01 RX ADMIN — Medication 8 UNITS: at 08:34

## 2023-01-01 RX ADMIN — APIXABAN 10 MG: 5 TABLET, FILM COATED ORAL at 20:23

## 2023-01-01 RX ADMIN — BUMETANIDE 1 MG: 1 TABLET ORAL at 08:18

## 2023-01-01 RX ADMIN — SODIUM CHLORIDE, PRESERVATIVE FREE 10 ML: 5 INJECTION INTRAVENOUS at 05:59

## 2023-01-01 RX ADMIN — HYDROMORPHONE HYDROCHLORIDE 0.5 MG: 1 INJECTION, SOLUTION INTRAMUSCULAR; INTRAVENOUS; SUBCUTANEOUS at 20:51

## 2023-01-01 RX ADMIN — SODIUM CHLORIDE, PRESERVATIVE FREE 10 ML: 5 INJECTION INTRAVENOUS at 21:00

## 2023-01-01 RX ADMIN — CALCIUM CHLORIDE, MAGNESIUM CHLORIDE, DEXTROSE MONOHYDRATE, LACTIC ACID, SODIUM CHLORIDE, SODIUM BICARBONATE AND POTASSIUM CHLORIDE: 5.15; 2.03; 22; 5.4; 6.46; 3.09; .157 INJECTION INTRAVENOUS at 00:16

## 2023-01-01 RX ADMIN — DEXMEDETOMIDINE HYDROCHLORIDE 1 MCG/KG/HR: 4 INJECTION, SOLUTION INTRAVENOUS at 02:19

## 2023-01-01 RX ADMIN — POTASSIUM CHLORIDE 20 MEQ: 29.8 INJECTION, SOLUTION INTRAVENOUS at 22:00

## 2023-01-01 RX ADMIN — CALCIUM CHLORIDE, MAGNESIUM CHLORIDE, DEXTROSE MONOHYDRATE, LACTIC ACID, SODIUM CHLORIDE, SODIUM BICARBONATE AND POTASSIUM CHLORIDE: 3.68; 3.05; 22; 5.4; 6.46; 3.09; .314 INJECTION INTRAVENOUS at 07:15

## 2023-01-01 RX ADMIN — IOPAMIDOL 100 ML: 755 INJECTION, SOLUTION INTRAVENOUS at 13:08

## 2023-01-01 RX ADMIN — LEVETIRACETAM 500 MG: 100 INJECTION INTRAVENOUS at 08:55

## 2023-01-01 RX ADMIN — CALCIUM CHLORIDE, MAGNESIUM CHLORIDE, DEXTROSE MONOHYDRATE, LACTIC ACID, SODIUM CHLORIDE, SODIUM BICARBONATE AND POTASSIUM CHLORIDE: 3.68; 3.05; 22; 5.4; 6.46; 3.09; .314 INJECTION INTRAVENOUS at 11:27

## 2023-01-01 RX ADMIN — Medication 500 UNITS: at 08:40

## 2023-01-01 RX ADMIN — Medication 2 UNITS: at 12:56

## 2023-01-01 RX ADMIN — SODIUM CHLORIDE, PRESERVATIVE FREE 10 ML: 5 INJECTION INTRAVENOUS at 22:01

## 2023-01-01 RX ADMIN — DEXMEDETOMIDINE HYDROCHLORIDE 0.7 MCG/KG/HR: 4 INJECTION, SOLUTION INTRAVENOUS at 03:57

## 2023-01-01 RX ADMIN — DOXYCYCLINE 100 MG: 100 INJECTION, POWDER, LYOPHILIZED, FOR SOLUTION INTRAVENOUS at 12:52

## 2023-01-01 RX ADMIN — CALCIUM CHLORIDE, MAGNESIUM CHLORIDE, DEXTROSE MONOHYDRATE, LACTIC ACID, SODIUM CHLORIDE, SODIUM BICARBONATE AND POTASSIUM CHLORIDE: 3.68; 3.05; 22; 5.4; 6.46; 3.09; .314 INJECTION INTRAVENOUS at 04:59

## 2023-01-01 RX ADMIN — HEPARIN SODIUM 18 UNITS/KG/HR: 10000 INJECTION, SOLUTION INTRAVENOUS at 22:17

## 2023-01-01 RX ADMIN — SODIUM CHLORIDE, PRESERVATIVE FREE 40 MG: 5 INJECTION INTRAVENOUS at 08:14

## 2023-01-01 RX ADMIN — MUPIROCIN: 20 OINTMENT TOPICAL at 18:26

## 2023-01-01 RX ADMIN — POLYETHYLENE GLYCOL 3350 17 G: 17 POWDER, FOR SOLUTION ORAL at 08:28

## 2023-01-01 RX ADMIN — DEXMEDETOMIDINE HYDROCHLORIDE 1.5 MCG/KG/HR: 4 INJECTION, SOLUTION INTRAVENOUS at 14:40

## 2023-01-01 RX ADMIN — EPINEPHRINE 2 MCG/MIN: 1 INJECTION INTRAMUSCULAR; INTRAVENOUS; SUBCUTANEOUS at 05:49

## 2023-01-01 RX ADMIN — Medication 5 UNITS: at 12:33

## 2023-01-01 RX ADMIN — ALTEPLASE 3 MG: 2.2 INJECTION, POWDER, LYOPHILIZED, FOR SOLUTION INTRAVENOUS at 17:02

## 2023-01-01 RX ADMIN — DEXMEDETOMIDINE HYDROCHLORIDE 0.6 MCG/KG/HR: 4 INJECTION, SOLUTION INTRAVENOUS at 04:03

## 2023-01-01 RX ADMIN — POTASSIUM CHLORIDE 40 MEQ: 750 TABLET, FILM COATED, EXTENDED RELEASE ORAL at 08:17

## 2023-01-01 RX ADMIN — Medication 6 UNITS: at 08:07

## 2023-01-01 RX ADMIN — ALBUMIN (HUMAN) 12.5 G: 0.25 INJECTION, SOLUTION INTRAVENOUS at 23:58

## 2023-01-01 RX ADMIN — SODIUM CHLORIDE, PRESERVATIVE FREE 10 ML: 5 INJECTION INTRAVENOUS at 13:04

## 2023-01-01 RX ADMIN — PROPOFOL 35 MCG/KG/MIN: 10 INJECTION, EMULSION INTRAVENOUS at 06:30

## 2023-01-01 RX ADMIN — OXYCODONE HYDROCHLORIDE 10 MG: 5 TABLET ORAL at 06:24

## 2023-01-01 RX ADMIN — Medication 5 UNITS: at 09:31

## 2023-01-01 RX ADMIN — IPRATROPIUM BROMIDE AND ALBUTEROL SULFATE 3 ML: .5; 3 SOLUTION RESPIRATORY (INHALATION) at 15:39

## 2023-01-01 RX ADMIN — SENNOSIDES AND DOCUSATE SODIUM 1 TABLET: 50; 8.6 TABLET ORAL at 08:34

## 2023-01-01 RX ADMIN — DEXTROSE MONOHYDRATE 75 ML: 100 INJECTION, SOLUTION INTRAVENOUS at 20:55

## 2023-01-01 RX ADMIN — DOXYCYCLINE 100 MG: 100 INJECTION, POWDER, LYOPHILIZED, FOR SOLUTION INTRAVENOUS at 00:34

## 2023-01-01 RX ADMIN — ETOMIDATE 10 MG: 2 INJECTION INTRAVENOUS at 13:27

## 2023-01-01 RX ADMIN — CALCIUM CHLORIDE, MAGNESIUM CHLORIDE, DEXTROSE MONOHYDRATE, LACTIC ACID, SODIUM CHLORIDE, SODIUM BICARBONATE AND POTASSIUM CHLORIDE: 3.68; 3.05; 22; 5.4; 6.46; 3.09; .314 INJECTION INTRAVENOUS at 05:36

## 2023-01-01 RX ADMIN — ASPIRIN 81 MG CHEWABLE TABLET 81 MG: 81 TABLET CHEWABLE at 08:17

## 2023-01-01 RX ADMIN — SODIUM CHLORIDE, PRESERVATIVE FREE 10 ML: 5 INJECTION INTRAVENOUS at 21:16

## 2023-01-01 RX ADMIN — Medication: at 17:46

## 2023-01-01 RX ADMIN — Medication 2 UNITS: at 13:12

## 2023-01-01 RX ADMIN — Medication 2 UNITS: at 18:39

## 2023-01-01 RX ADMIN — CALCIUM CHLORIDE, MAGNESIUM CHLORIDE, DEXTROSE MONOHYDRATE, LACTIC ACID, SODIUM CHLORIDE, SODIUM BICARBONATE AND POTASSIUM CHLORIDE: 3.68; 3.05; 22; 5.4; 6.46; 3.09; .314 INJECTION INTRAVENOUS at 17:16

## 2023-01-01 RX ADMIN — CALCIUM CHLORIDE, MAGNESIUM CHLORIDE, DEXTROSE MONOHYDRATE, LACTIC ACID, SODIUM CHLORIDE, SODIUM BICARBONATE AND POTASSIUM CHLORIDE: 3.68; 3.05; 22; 5.4; 6.46; 3.09; .314 INJECTION INTRAVENOUS at 09:41

## 2023-01-01 RX ADMIN — ALTEPLASE 3 MG: 2.2 INJECTION, POWDER, LYOPHILIZED, FOR SOLUTION INTRAVENOUS at 04:55

## 2023-01-01 RX ADMIN — ACETAMINOPHEN 650 MG: 325 TABLET ORAL at 02:00

## 2023-01-01 RX ADMIN — CINACALCET HYDROCHLORIDE 60 MG: 30 TABLET, FILM COATED ORAL at 08:41

## 2023-01-01 RX ADMIN — MILRINONE LACTATE IN DEXTROSE 0.38 MCG/KG/MIN: 200 INJECTION, SOLUTION INTRAVENOUS at 06:42

## 2023-01-01 RX ADMIN — INSULIN GLARGINE 10 UNITS: 100 INJECTION, SOLUTION SUBCUTANEOUS at 08:39

## 2023-01-01 RX ADMIN — HYDROMORPHONE HYDROCHLORIDE 0.5 MG: 1 INJECTION, SOLUTION INTRAMUSCULAR; INTRAVENOUS; SUBCUTANEOUS at 12:25

## 2023-01-01 RX ADMIN — SODIUM CHLORIDE, PRESERVATIVE FREE 10 ML: 5 INJECTION INTRAVENOUS at 23:12

## 2023-01-01 RX ADMIN — EPOETIN ALFA-EPBX 10000 UNITS: 10000 INJECTION, SOLUTION INTRAVENOUS; SUBCUTANEOUS at 16:30

## 2023-01-01 RX ADMIN — HYDROMORPHONE HYDROCHLORIDE 1 MG: 1 INJECTION, SOLUTION INTRAMUSCULAR; INTRAVENOUS; SUBCUTANEOUS at 09:55

## 2023-01-01 RX ADMIN — CALCIUM CHLORIDE, MAGNESIUM CHLORIDE, DEXTROSE MONOHYDRATE, LACTIC ACID, SODIUM CHLORIDE, SODIUM BICARBONATE AND POTASSIUM CHLORIDE: 3.68; 3.05; 22; 5.4; 6.46; 3.09; .314 INJECTION INTRAVENOUS at 11:32

## 2023-01-01 RX ADMIN — Medication 6 UNITS: at 09:24

## 2023-01-01 RX ADMIN — ASPIRIN 81 MG CHEWABLE TABLET 81 MG: 81 TABLET CHEWABLE at 08:31

## 2023-01-01 RX ADMIN — Medication 8 UNITS: at 17:12

## 2023-01-01 RX ADMIN — FUROSEMIDE 40 MG: 10 INJECTION, SOLUTION INTRAMUSCULAR; INTRAVENOUS at 18:35

## 2023-01-01 RX ADMIN — NEOMYCIN SULFATE, POLYMYXIN B SULFATE, AND DEXAMETHASONE: 3.5; 10000; 1 OINTMENT OPHTHALMIC at 18:22

## 2023-01-01 RX ADMIN — NOREPINEPHRINE BITARTRATE 3 MCG/MIN: 1 INJECTION, SOLUTION, CONCENTRATE INTRAVENOUS at 04:48

## 2023-01-01 RX ADMIN — CALCIUM CHLORIDE, MAGNESIUM CHLORIDE, DEXTROSE MONOHYDRATE, LACTIC ACID, SODIUM CHLORIDE, SODIUM BICARBONATE AND POTASSIUM CHLORIDE 1600 ML/HR: 3.68; 3.05; 22; 5.4; 6.46; 3.09; .314 INJECTION INTRAVENOUS at 15:20

## 2023-01-01 RX ADMIN — HYDROMORPHONE HYDROCHLORIDE 0.5 MG: 1 INJECTION, SOLUTION INTRAMUSCULAR; INTRAVENOUS; SUBCUTANEOUS at 05:43

## 2023-01-01 RX ADMIN — ALBUMIN (HUMAN) 12.5 G: 0.25 INJECTION, SOLUTION INTRAVENOUS at 23:20

## 2023-01-01 RX ADMIN — ALBUMIN (HUMAN) 12.5 G: 0.25 INJECTION, SOLUTION INTRAVENOUS at 05:20

## 2023-01-01 RX ADMIN — DEXMEDETOMIDINE HYDROCHLORIDE 1.5 MCG/KG/HR: 4 INJECTION, SOLUTION INTRAVENOUS at 13:59

## 2023-01-01 RX ADMIN — AMIODARONE HYDROCHLORIDE 400 MG: 200 TABLET ORAL at 18:38

## 2023-01-01 RX ADMIN — BUMETANIDE 1 MG: 1 TABLET ORAL at 18:40

## 2023-01-01 RX ADMIN — AMIODARONE HYDROCHLORIDE 400 MG: 200 TABLET ORAL at 17:23

## 2023-01-01 RX ADMIN — WARFARIN SODIUM 2 MG: 2 TABLET ORAL at 17:32

## 2023-01-01 RX ADMIN — Medication: at 18:11

## 2023-01-01 RX ADMIN — SODIUM CHLORIDE 40 MG: 9 INJECTION, SOLUTION INTRAMUSCULAR; INTRAVENOUS; SUBCUTANEOUS at 16:05

## 2023-01-01 RX ADMIN — ALBUMIN (HUMAN) 12.5 G: 0.25 INJECTION, SOLUTION INTRAVENOUS at 11:25

## 2023-01-01 RX ADMIN — NEOMYCIN SULFATE, POLYMYXIN B SULFATE, AND DEXAMETHASONE: 3.5; 10000; 1 OINTMENT OPHTHALMIC at 17:07

## 2023-01-01 RX ADMIN — SODIUM CHLORIDE, PRESERVATIVE FREE 1 G: 5 INJECTION INTRAVENOUS at 14:45

## 2023-01-01 RX ADMIN — BUMETANIDE 2 MG: 0.25 INJECTION, SOLUTION INTRAMUSCULAR; INTRAVENOUS at 08:12

## 2023-01-01 RX ADMIN — SODIUM CHLORIDE, PRESERVATIVE FREE 10 ML: 5 INJECTION INTRAVENOUS at 06:37

## 2023-01-01 RX ADMIN — CLOPIDOGREL BISULFATE 75 MG: 75 TABLET ORAL at 08:18

## 2023-01-01 RX ADMIN — PIPERACILLIN AND TAZOBACTAM 3.38 G: 3; .375 INJECTION, POWDER, FOR SOLUTION INTRAVENOUS at 12:22

## 2023-01-01 RX ADMIN — CALCIUM CHLORIDE, MAGNESIUM CHLORIDE, DEXTROSE MONOHYDRATE, LACTIC ACID, SODIUM CHLORIDE, SODIUM BICARBONATE AND POTASSIUM CHLORIDE: 3.68; 3.05; 22; 5.4; 6.46; 3.09; .314 INJECTION INTRAVENOUS at 11:00

## 2023-01-01 RX ADMIN — PANTOPRAZOLE SODIUM 40 MG: 40 TABLET, DELAYED RELEASE ORAL at 06:27

## 2023-01-01 RX ADMIN — APIXABAN 5 MG: 5 TABLET, FILM COATED ORAL at 17:39

## 2023-01-01 RX ADMIN — CALCIUM CHLORIDE, MAGNESIUM CHLORIDE, DEXTROSE MONOHYDRATE, LACTIC ACID, SODIUM CHLORIDE, SODIUM BICARBONATE AND POTASSIUM CHLORIDE: 3.68; 3.05; 22; 5.4; 6.46; 3.09; .314 INJECTION INTRAVENOUS at 18:47

## 2023-01-01 RX ADMIN — AMIODARONE HYDROCHLORIDE 400 MG: 200 TABLET ORAL at 09:01

## 2023-01-01 RX ADMIN — SODIUM CHLORIDE, PRESERVATIVE FREE 10 ML: 5 INJECTION INTRAVENOUS at 12:32

## 2023-01-01 RX ADMIN — SODIUM CHLORIDE, PRESERVATIVE FREE 10 ML: 5 INJECTION INTRAVENOUS at 06:36

## 2023-01-01 RX ADMIN — FENTANYL CITRATE 50 MCG/HR: 0.05 INJECTION, SOLUTION INTRAMUSCULAR; INTRAVENOUS at 16:59

## 2023-01-01 RX ADMIN — OXYCODONE HYDROCHLORIDE 10 MG: 5 TABLET ORAL at 00:07

## 2023-01-01 RX ADMIN — Medication 4 UNITS: at 12:42

## 2023-01-01 RX ADMIN — SODIUM CHLORIDE, PRESERVATIVE FREE 40 MG: 5 INJECTION INTRAVENOUS at 09:13

## 2023-01-01 RX ADMIN — ALBUMIN (HUMAN) 12.5 G: 0.25 INJECTION, SOLUTION INTRAVENOUS at 17:19

## 2023-01-01 RX ADMIN — IOPAMIDOL 100 ML: 755 INJECTION, SOLUTION INTRAVENOUS at 13:00

## 2023-01-01 RX ADMIN — CALCIUM CHLORIDE, MAGNESIUM CHLORIDE, DEXTROSE MONOHYDRATE, LACTIC ACID, SODIUM CHLORIDE, SODIUM BICARBONATE AND POTASSIUM CHLORIDE: 3.68; 3.05; 22; 5.4; 6.46; 3.09; .314 INJECTION INTRAVENOUS at 15:47

## 2023-01-01 RX ADMIN — Medication 6 UNITS: at 08:18

## 2023-01-01 RX ADMIN — HYDRALAZINE HYDROCHLORIDE 5 MG: 20 INJECTION INTRAMUSCULAR; INTRAVENOUS at 20:21

## 2023-01-01 RX ADMIN — Medication: at 17:07

## 2023-01-01 RX ADMIN — Medication: at 08:18

## 2023-01-01 RX ADMIN — MILRINONE LACTATE IN DEXTROSE 0.2 MCG/KG/MIN: 200 INJECTION, SOLUTION INTRAVENOUS at 10:30

## 2023-01-01 RX ADMIN — Medication: at 17:18

## 2023-01-01 RX ADMIN — MILRINONE LACTATE 0.1 MCG/KG/MIN: 0.2 INJECTION, SOLUTION INTRAVENOUS at 10:02

## 2023-01-01 RX ADMIN — AMIODARONE HYDROCHLORIDE 0.5 MG/MIN: 50 INJECTION, SOLUTION INTRAVENOUS at 04:15

## 2023-01-01 RX ADMIN — Medication 2 UNITS: at 09:24

## 2023-01-01 RX ADMIN — CALCIUM CHLORIDE, MAGNESIUM CHLORIDE, DEXTROSE MONOHYDRATE, LACTIC ACID, SODIUM CHLORIDE, SODIUM BICARBONATE AND POTASSIUM CHLORIDE: 3.68; 3.05; 22; 5.4; 6.46; 3.09; .314 INJECTION INTRAVENOUS at 22:10

## 2023-01-01 RX ADMIN — Medication 8 UNITS: at 12:01

## 2023-01-01 RX ADMIN — ALBUMIN (HUMAN) 12.5 G: 0.25 INJECTION, SOLUTION INTRAVENOUS at 08:13

## 2023-01-01 RX ADMIN — CALCIUM CHLORIDE, MAGNESIUM CHLORIDE, DEXTROSE MONOHYDRATE, LACTIC ACID, SODIUM CHLORIDE, SODIUM BICARBONATE AND POTASSIUM CHLORIDE: 3.68; 3.05; 22; 5.4; 6.46; 3.09; .314 INJECTION INTRAVENOUS at 20:06

## 2023-01-01 RX ADMIN — MEROPENEM 1 G: 1 INJECTION, POWDER, FOR SOLUTION INTRAVENOUS at 06:08

## 2023-01-01 RX ADMIN — ASPIRIN 81 MG: 81 TABLET, COATED ORAL at 08:54

## 2023-01-01 RX ADMIN — CALCIUM CHLORIDE, MAGNESIUM CHLORIDE, DEXTROSE MONOHYDRATE, LACTIC ACID, SODIUM CHLORIDE, SODIUM BICARBONATE AND POTASSIUM CHLORIDE: 3.68; 3.05; 22; 5.4; 6.46; 3.09; .314 INJECTION INTRAVENOUS at 17:45

## 2023-01-01 RX ADMIN — FLUCONAZOLE IN SODIUM CHLORIDE 200 MG: 2 INJECTION, SOLUTION INTRAVENOUS at 03:11

## 2023-01-01 RX ADMIN — CALCIUM CHLORIDE, MAGNESIUM CHLORIDE, DEXTROSE MONOHYDRATE, LACTIC ACID, SODIUM CHLORIDE, SODIUM BICARBONATE AND POTASSIUM CHLORIDE: 3.68; 3.05; 22; 5.4; 6.46; 3.09; .314 INJECTION INTRAVENOUS at 01:40

## 2023-01-01 RX ADMIN — CALCIUM CHLORIDE, MAGNESIUM CHLORIDE, DEXTROSE MONOHYDRATE, LACTIC ACID, SODIUM CHLORIDE, SODIUM BICARBONATE AND POTASSIUM CHLORIDE: 3.68; 3.05; 22; 5.4; 6.46; 3.09; .314 INJECTION INTRAVENOUS at 14:39

## 2023-01-01 RX ADMIN — ALBUMIN (HUMAN) 12.5 G: 0.25 INJECTION, SOLUTION INTRAVENOUS at 11:27

## 2023-01-01 RX ADMIN — Medication: at 10:09

## 2023-01-01 RX ADMIN — SODIUM CHLORIDE, PRESERVATIVE FREE 5 ML: 5 INJECTION INTRAVENOUS at 15:50

## 2023-01-01 RX ADMIN — CALCIUM CHLORIDE, MAGNESIUM CHLORIDE, DEXTROSE MONOHYDRATE, LACTIC ACID, SODIUM CHLORIDE, SODIUM BICARBONATE AND POTASSIUM CHLORIDE: 3.68; 3.05; 22; 5.4; 6.46; 3.09; .314 INJECTION INTRAVENOUS at 11:44

## 2023-01-01 RX ADMIN — SENNOSIDES AND DOCUSATE SODIUM 1 TABLET: 50; 8.6 TABLET ORAL at 11:01

## 2023-01-01 RX ADMIN — CALCIUM CHLORIDE, MAGNESIUM CHLORIDE, DEXTROSE MONOHYDRATE, LACTIC ACID, SODIUM CHLORIDE, SODIUM BICARBONATE AND POTASSIUM CHLORIDE: 3.68; 3.05; 22; 5.4; 6.46; 3.09; .314 INJECTION INTRAVENOUS at 12:18

## 2023-01-01 RX ADMIN — ASPIRIN 81 MG: 81 TABLET, COATED ORAL at 08:39

## 2023-01-01 RX ADMIN — MILRINONE LACTATE IN DEXTROSE 0.2 MCG/KG/MIN: 200 INJECTION, SOLUTION INTRAVENOUS at 03:28

## 2023-01-01 RX ADMIN — INSULIN GLARGINE 10 UNITS: 100 INJECTION, SOLUTION SUBCUTANEOUS at 08:30

## 2023-01-01 RX ADMIN — NEOMYCIN SULFATE, POLYMYXIN B SULFATE, AND DEXAMETHASONE: 3.5; 10000; 1 OINTMENT OPHTHALMIC at 08:30

## 2023-01-01 RX ADMIN — Medication 2 UNITS: at 03:59

## 2023-01-01 RX ADMIN — LEVETIRACETAM 500 MG: 100 INJECTION INTRAVENOUS at 21:20

## 2023-01-01 RX ADMIN — SODIUM PHOSPHATE, MONOBASIC, MONOHYDRATE AND SODIUM PHOSPHATE, DIBASIC, ANHYDROUS: 276; 142 INJECTION, SOLUTION INTRAVENOUS at 14:41

## 2023-01-01 RX ADMIN — IOPAMIDOL 20 ML: 755 INJECTION, SOLUTION INTRAVENOUS at 00:32

## 2023-01-01 RX ADMIN — WHITE PETROLATUM 57.7 %-MINERAL OIL 31.9 % EYE OINTMENT: at 21:15

## 2023-01-01 RX ADMIN — POLYETHYLENE GLYCOL 3350 17 G: 17 POWDER, FOR SOLUTION ORAL at 09:06

## 2023-01-01 RX ADMIN — CHLORHEXIDINE GLUCONATE 15 ML: 1.2 RINSE ORAL at 21:00

## 2023-01-01 RX ADMIN — CALCIUM CHLORIDE, MAGNESIUM CHLORIDE, DEXTROSE MONOHYDRATE, LACTIC ACID, SODIUM CHLORIDE, SODIUM BICARBONATE AND POTASSIUM CHLORIDE: 3.68; 3.05; 22; 5.4; 6.46; 3.09; .314 INJECTION INTRAVENOUS at 11:30

## 2023-01-01 RX ADMIN — Medication: at 19:07

## 2023-01-01 RX ADMIN — Medication: at 21:10

## 2023-01-01 RX ADMIN — NOREPINEPHRINE BITARTRATE 4 MCG/MIN: 1 INJECTION, SOLUTION, CONCENTRATE INTRAVENOUS at 01:00

## 2023-01-01 RX ADMIN — HEPARIN SODIUM 3260 UNITS: 1000 INJECTION INTRAVENOUS; SUBCUTANEOUS at 14:34

## 2023-01-01 RX ADMIN — Medication 4 UNITS: at 09:04

## 2023-01-01 RX ADMIN — ALTEPLASE 3 MG: 2.2 INJECTION, POWDER, LYOPHILIZED, FOR SOLUTION INTRAVENOUS at 13:51

## 2023-01-01 RX ADMIN — ASPIRIN 81 MG: 81 TABLET, COATED ORAL at 09:00

## 2023-01-01 RX ADMIN — ROSUVASTATIN 20 MG: 10 TABLET, FILM COATED ORAL at 21:34

## 2023-01-01 RX ADMIN — NOREPINEPHRINE BITARTRATE 5 MCG/MIN: 1 INJECTION, SOLUTION, CONCENTRATE INTRAVENOUS at 07:49

## 2023-01-01 RX ADMIN — DEXMEDETOMIDINE HYDROCHLORIDE 1.5 MCG/KG/HR: 4 INJECTION, SOLUTION INTRAVENOUS at 19:42

## 2023-01-01 RX ADMIN — HYDROMORPHONE HYDROCHLORIDE 0.5 MG: 1 INJECTION, SOLUTION INTRAMUSCULAR; INTRAVENOUS; SUBCUTANEOUS at 16:03

## 2023-01-01 RX ADMIN — PROPOFOL 10 MCG/KG/MIN: 10 INJECTION, EMULSION INTRAVENOUS at 06:48

## 2023-01-01 RX ADMIN — Medication 12 UNITS: at 16:48

## 2023-01-01 RX ADMIN — ALBUMIN (HUMAN) 12.5 G: 0.25 INJECTION, SOLUTION INTRAVENOUS at 00:16

## 2023-01-01 RX ADMIN — DEXMEDETOMIDINE HYDROCHLORIDE 1.5 MCG/KG/HR: 4 INJECTION, SOLUTION INTRAVENOUS at 12:33

## 2023-01-01 RX ADMIN — INSULIN GLARGINE 8 UNITS: 100 INJECTION, SOLUTION SUBCUTANEOUS at 09:06

## 2023-01-01 RX ADMIN — NEOMYCIN SULFATE, POLYMYXIN B SULFATE, AND DEXAMETHASONE: 3.5; 10000; 1 OINTMENT OPHTHALMIC at 09:14

## 2023-01-01 RX ADMIN — DEXMEDETOMIDINE HYDROCHLORIDE 1 MCG/KG/HR: 4 INJECTION, SOLUTION INTRAVENOUS at 06:23

## 2023-01-01 RX ADMIN — ALBUMIN (HUMAN) 12.5 G: 0.25 INJECTION, SOLUTION INTRAVENOUS at 20:51

## 2023-01-01 RX ADMIN — ALBUMIN (HUMAN) 12.5 G: 0.25 INJECTION, SOLUTION INTRAVENOUS at 08:12

## 2023-01-01 RX ADMIN — LEVETIRACETAM 500 MG: 100 INJECTION INTRAVENOUS at 21:25

## 2023-01-01 RX ADMIN — CALCIUM CHLORIDE, MAGNESIUM CHLORIDE, DEXTROSE MONOHYDRATE, LACTIC ACID, SODIUM CHLORIDE, SODIUM BICARBONATE AND POTASSIUM CHLORIDE: 5.15; 2.03; 22; 5.4; 6.46; 3.09; .157 INJECTION INTRAVENOUS at 17:48

## 2023-01-01 RX ADMIN — NEOMYCIN SULFATE, POLYMYXIN B SULFATE, AND DEXAMETHASONE: 3.5; 10000; 1 OINTMENT OPHTHALMIC at 17:45

## 2023-01-01 RX ADMIN — SODIUM CHLORIDE, PRESERVATIVE FREE 40 MG: 5 INJECTION INTRAVENOUS at 08:30

## 2023-01-01 RX ADMIN — AMIODARONE HYDROCHLORIDE 400 MG: 200 TABLET ORAL at 08:31

## 2023-01-01 RX ADMIN — PIPERACILLIN AND TAZOBACTAM 3.38 G: 3; .375 INJECTION, POWDER, FOR SOLUTION INTRAVENOUS at 13:59

## 2023-01-01 RX ADMIN — CALCIUM CHLORIDE, MAGNESIUM CHLORIDE, DEXTROSE MONOHYDRATE, LACTIC ACID, SODIUM CHLORIDE, SODIUM BICARBONATE AND POTASSIUM CHLORIDE: 3.68; 3.05; 22; 5.4; 6.46; 3.09; .314 INJECTION INTRAVENOUS at 21:19

## 2023-01-01 RX ADMIN — NEOMYCIN SULFATE, POLYMYXIN B SULFATE, AND DEXAMETHASONE: 3.5; 10000; 1 OINTMENT OPHTHALMIC at 09:33

## 2023-01-01 RX ADMIN — PIPERACILLIN AND TAZOBACTAM 3.38 G: 3; .375 INJECTION, POWDER, FOR SOLUTION INTRAVENOUS at 13:04

## 2023-01-01 RX ADMIN — PIPERACILLIN AND TAZOBACTAM 3.38 G: 3; .375 INJECTION, POWDER, FOR SOLUTION INTRAVENOUS at 05:14

## 2023-01-01 RX ADMIN — NEOMYCIN SULFATE, POLYMYXIN B SULFATE, AND DEXAMETHASONE: 3.5; 10000; 1 OINTMENT OPHTHALMIC at 17:16

## 2023-01-01 RX ADMIN — DEXMEDETOMIDINE HYDROCHLORIDE 1 MCG/KG/HR: 4 INJECTION, SOLUTION INTRAVENOUS at 18:23

## 2023-01-01 RX ADMIN — POTASSIUM CHLORIDE 20 MEQ: 29.8 INJECTION, SOLUTION INTRAVENOUS at 07:25

## 2023-01-01 RX ADMIN — CHLORHEXIDINE GLUCONATE 10 ML: 1.2 RINSE ORAL at 09:37

## 2023-01-01 RX ADMIN — CALCIUM CHLORIDE, MAGNESIUM CHLORIDE, DEXTROSE MONOHYDRATE, LACTIC ACID, SODIUM CHLORIDE, SODIUM BICARBONATE AND POTASSIUM CHLORIDE: 5.15; 2.03; 22; 5.4; 6.46; 3.09; .157 INJECTION INTRAVENOUS at 03:15

## 2023-01-01 RX ADMIN — SODIUM CHLORIDE, PRESERVATIVE FREE 10 ML: 5 INJECTION INTRAVENOUS at 06:30

## 2023-01-01 RX ADMIN — ALBUMIN (HUMAN) 25 G: 12.5 INJECTION, SOLUTION INTRAVENOUS at 12:17

## 2023-01-01 RX ADMIN — ALBUMIN (HUMAN) 12.5 G: 0.25 INJECTION, SOLUTION INTRAVENOUS at 11:00

## 2023-01-01 RX ADMIN — MILRINONE LACTATE IN DEXTROSE 0.3 MCG/KG/MIN: 200 INJECTION, SOLUTION INTRAVENOUS at 00:35

## 2023-01-01 RX ADMIN — ACETAMINOPHEN 650 MG: 325 TABLET ORAL at 21:47

## 2023-01-01 RX ADMIN — DEXMEDETOMIDINE HYDROCHLORIDE 1.5 MCG/KG/HR: 4 INJECTION, SOLUTION INTRAVENOUS at 00:12

## 2023-01-01 RX ADMIN — DEXMEDETOMIDINE HYDROCHLORIDE 1.3 MCG/KG/HR: 4 INJECTION, SOLUTION INTRAVENOUS at 18:32

## 2023-01-01 RX ADMIN — Medication 3 UNITS: at 12:27

## 2023-01-01 RX ADMIN — COLCHICINE 0.6 MG: 0.6 TABLET, FILM COATED ORAL at 09:25

## 2023-01-01 RX ADMIN — ALBUMIN (HUMAN) 25 G: 0.25 INJECTION, SOLUTION INTRAVENOUS at 12:38

## 2023-01-01 RX ADMIN — FENTANYL CITRATE 150 MCG/HR: 0.05 INJECTION, SOLUTION INTRAMUSCULAR; INTRAVENOUS at 19:13

## 2023-01-01 RX ADMIN — AMIODARONE HYDROCHLORIDE 400 MG: 200 TABLET ORAL at 09:13

## 2023-01-01 RX ADMIN — SODIUM PHOSPHATE, MONOBASIC, MONOHYDRATE AND SODIUM PHOSPHATE, DIBASIC, ANHYDROUS: 276; 142 INJECTION, SOLUTION INTRAVENOUS at 12:07

## 2023-01-01 RX ADMIN — CALCIUM CHLORIDE, MAGNESIUM CHLORIDE, DEXTROSE MONOHYDRATE, LACTIC ACID, SODIUM CHLORIDE, SODIUM BICARBONATE AND POTASSIUM CHLORIDE: 3.68; 3.05; 22; 5.4; 6.46; 3.09; .314 INJECTION INTRAVENOUS at 05:30

## 2023-01-01 RX ADMIN — FUROSEMIDE 40 MG: 10 INJECTION, SOLUTION INTRAMUSCULAR; INTRAVENOUS at 10:36

## 2023-01-01 RX ADMIN — LEVETIRACETAM 500 MG: 100 INJECTION INTRAVENOUS at 20:51

## 2023-01-01 RX ADMIN — TRAZODONE HYDROCHLORIDE 50 MG: 50 TABLET ORAL at 20:35

## 2023-01-01 RX ADMIN — Medication 500 UNITS: at 08:55

## 2023-01-01 RX ADMIN — Medication 2 UNITS: at 05:35

## 2023-01-01 RX ADMIN — Medication: at 17:45

## 2023-01-01 RX ADMIN — Medication 80 MCG: at 00:45

## 2023-01-01 RX ADMIN — CALCIUM CHLORIDE, MAGNESIUM CHLORIDE, DEXTROSE MONOHYDRATE, LACTIC ACID, SODIUM CHLORIDE, SODIUM BICARBONATE AND POTASSIUM CHLORIDE 1700 ML/HR: 3.68; 3.05; 22; 5.4; 6.46; 3.09; .314 INJECTION INTRAVENOUS at 16:45

## 2023-01-01 RX ADMIN — Medication 2 UNITS: at 06:39

## 2023-01-01 RX ADMIN — SODIUM CHLORIDE, PRESERVATIVE FREE 10 ML: 5 INJECTION INTRAVENOUS at 00:39

## 2023-01-01 RX ADMIN — ALBUMIN (HUMAN) 12.5 G: 0.25 INJECTION, SOLUTION INTRAVENOUS at 06:28

## 2023-01-01 RX ADMIN — CALCIUM CHLORIDE, MAGNESIUM CHLORIDE, DEXTROSE MONOHYDRATE, LACTIC ACID, SODIUM CHLORIDE, SODIUM BICARBONATE AND POTASSIUM CHLORIDE: 5.15; 2.03; 22; 5.4; 6.46; 3.09; .157 INJECTION INTRAVENOUS at 20:40

## 2023-01-01 RX ADMIN — METRONIDAZOLE 500 MG: 500 INJECTION, SOLUTION INTRAVENOUS at 09:06

## 2023-01-01 RX ADMIN — MILRINONE LACTATE IN DEXTROSE 0.3 MCG/KG/MIN: 200 INJECTION, SOLUTION INTRAVENOUS at 04:31

## 2023-01-01 RX ADMIN — AMIODARONE HYDROCHLORIDE 400 MG: 200 TABLET ORAL at 09:42

## 2023-01-01 RX ADMIN — NEOMYCIN SULFATE, POLYMYXIN B SULFATE, AND DEXAMETHASONE: 3.5; 10000; 1 OINTMENT OPHTHALMIC at 17:51

## 2023-01-01 RX ADMIN — METRONIDAZOLE 500 MG: 500 INJECTION, SOLUTION INTRAVENOUS at 08:43

## 2023-01-01 RX ADMIN — FLUCONAZOLE IN SODIUM CHLORIDE 400 MG: 2 INJECTION, SOLUTION INTRAVENOUS at 11:40

## 2023-01-01 RX ADMIN — NEOMYCIN SULFATE, POLYMYXIN B SULFATE, AND DEXAMETHASONE: 3.5; 10000; 1 OINTMENT OPHTHALMIC at 08:50

## 2023-01-01 RX ADMIN — ALTEPLASE 3 MG: 2.2 INJECTION, POWDER, LYOPHILIZED, FOR SOLUTION INTRAVENOUS at 02:57

## 2023-01-01 RX ADMIN — SODIUM CHLORIDE, PRESERVATIVE FREE 30 ML: 5 INJECTION INTRAVENOUS at 15:40

## 2023-01-01 RX ADMIN — METRONIDAZOLE 500 MG: 500 INJECTION, SOLUTION INTRAVENOUS at 21:40

## 2023-01-01 RX ADMIN — ACETAMINOPHEN 650 MG: 325 TABLET ORAL at 21:32

## 2023-01-01 RX ADMIN — HYDROCORTISONE SODIUM SUCCINATE 50 MG: 100 INJECTION, POWDER, FOR SOLUTION INTRAMUSCULAR; INTRAVENOUS at 21:14

## 2023-01-01 RX ADMIN — PIPERACILLIN AND TAZOBACTAM 3.38 G: 3; .375 INJECTION, POWDER, FOR SOLUTION INTRAVENOUS at 05:44

## 2023-01-01 RX ADMIN — Medication 3 UNITS: at 23:19

## 2023-01-01 RX ADMIN — SODIUM CHLORIDE, PRESERVATIVE FREE 1 G: 5 INJECTION INTRAVENOUS at 11:51

## 2023-01-01 RX ADMIN — SODIUM CHLORIDE 5 MG/HR: 9 INJECTION, SOLUTION INTRAVENOUS at 21:10

## 2023-01-01 RX ADMIN — Medication 5 UNITS: at 20:33

## 2023-01-01 RX ADMIN — ALBUMIN (HUMAN) 12.5 G: 0.25 INJECTION, SOLUTION INTRAVENOUS at 17:18

## 2023-01-01 RX ADMIN — SENNOSIDES AND DOCUSATE SODIUM 1 TABLET: 50; 8.6 TABLET ORAL at 09:07

## 2023-01-01 RX ADMIN — ACETAMINOPHEN 650 MG: 325 TABLET ORAL at 20:54

## 2023-01-01 RX ADMIN — CINACALCET HYDROCHLORIDE 30 MG: 30 TABLET, FILM COATED ORAL at 08:31

## 2023-01-01 RX ADMIN — SODIUM CHLORIDE, PRESERVATIVE FREE 10 ML: 5 INJECTION INTRAVENOUS at 13:49

## 2023-01-01 RX ADMIN — DEXMEDETOMIDINE HYDROCHLORIDE 0.6 MCG/KG/HR: 4 INJECTION, SOLUTION INTRAVENOUS at 12:50

## 2023-01-01 RX ADMIN — MILRINONE LACTATE IN DEXTROSE 0.38 MCG/KG/MIN: 200 INJECTION, SOLUTION INTRAVENOUS at 17:47

## 2023-01-01 RX ADMIN — APIXABAN 5 MG: 5 TABLET, FILM COATED ORAL at 08:27

## 2023-01-01 RX ADMIN — DOBUTAMINE HYDROCHLORIDE 3 MCG/KG/MIN: 200 INJECTION INTRAVENOUS at 05:26

## 2023-01-01 RX ADMIN — DEXMEDETOMIDINE HYDROCHLORIDE 1.5 MCG/KG/HR: 4 INJECTION, SOLUTION INTRAVENOUS at 05:14

## 2023-01-01 RX ADMIN — SODIUM CHLORIDE, PRESERVATIVE FREE 10 ML: 5 INJECTION INTRAVENOUS at 14:00

## 2023-01-01 RX ADMIN — SODIUM CHLORIDE 2.5 MG/HR: 9 INJECTION, SOLUTION INTRAVENOUS at 18:08

## 2023-01-01 RX ADMIN — METOPROLOL SUCCINATE 12.5 MG: 25 TABLET, EXTENDED RELEASE ORAL at 22:00

## 2023-01-01 RX ADMIN — DOBUTAMINE HYDROCHLORIDE 2 MCG/KG/MIN: 200 INJECTION INTRAVENOUS at 16:25

## 2023-01-01 RX ADMIN — NOREPINEPHRINE BITARTRATE 7 MCG/MIN: 1 INJECTION, SOLUTION, CONCENTRATE INTRAVENOUS at 05:32

## 2023-01-01 RX ADMIN — PIPERACILLIN AND TAZOBACTAM 3.38 G: 3; .375 INJECTION, POWDER, FOR SOLUTION INTRAVENOUS at 05:20

## 2023-01-01 RX ADMIN — DEXTROSE MONOHYDRATE 30 ML/HR: 100 INJECTION, SOLUTION INTRAVENOUS at 16:59

## 2023-01-01 RX ADMIN — SODIUM CHLORIDE, PRESERVATIVE FREE 10 ML: 5 INJECTION INTRAVENOUS at 06:08

## 2023-01-01 RX ADMIN — DEXMEDETOMIDINE HYDROCHLORIDE 1 MCG/KG/HR: 4 INJECTION, SOLUTION INTRAVENOUS at 09:13

## 2023-01-01 RX ADMIN — AMIODARONE HYDROCHLORIDE 400 MG: 200 TABLET ORAL at 18:14

## 2023-01-01 RX ADMIN — ZOLPIDEM TARTRATE 5 MG: 5 TABLET ORAL at 21:19

## 2023-01-01 RX ADMIN — CINACALCET HYDROCHLORIDE 60 MG: 30 TABLET, FILM COATED ORAL at 19:01

## 2023-01-01 RX ADMIN — SODIUM CHLORIDE 9 ML/HR: 9 INJECTION, SOLUTION INTRAVENOUS at 21:46

## 2023-01-01 RX ADMIN — GABAPENTIN 200 MG: 100 CAPSULE ORAL at 08:48

## 2023-01-01 RX ADMIN — ASPIRIN 81 MG: 81 TABLET, COATED ORAL at 08:42

## 2023-01-01 RX ADMIN — HEPARIN SODIUM 1870 UNITS: 1000 INJECTION INTRAVENOUS; SUBCUTANEOUS at 11:05

## 2023-01-01 RX ADMIN — ONDANSETRON 4 MG: 4 TABLET, ORALLY DISINTEGRATING ORAL at 13:57

## 2023-01-01 RX ADMIN — Medication 6 UNITS: at 08:12

## 2023-01-01 RX ADMIN — CHLORHEXIDINE GLUCONATE 10 ML: 1.2 RINSE ORAL at 18:26

## 2023-01-01 RX ADMIN — AMIODARONE HYDROCHLORIDE 400 MG: 200 TABLET ORAL at 10:05

## 2023-01-01 RX ADMIN — ASPIRIN 81 MG CHEWABLE TABLET 81 MG: 81 TABLET CHEWABLE at 08:49

## 2023-01-01 RX ADMIN — SODIUM CHLORIDE, PRESERVATIVE FREE 10 ML: 5 INJECTION INTRAVENOUS at 06:15

## 2023-01-01 RX ADMIN — SODIUM CHLORIDE, PRESERVATIVE FREE 10 ML: 5 INJECTION INTRAVENOUS at 21:03

## 2023-01-01 RX ADMIN — BUMETANIDE 1 MG: 1 TABLET ORAL at 08:28

## 2023-01-01 RX ADMIN — ACETAMINOPHEN 650 MG: 650 SUPPOSITORY RECTAL at 20:13

## 2023-01-01 RX ADMIN — Medication 2 UNITS: at 11:50

## 2023-01-01 RX ADMIN — PANTOPRAZOLE SODIUM 40 MG: 40 TABLET, DELAYED RELEASE ORAL at 07:32

## 2023-01-01 RX ADMIN — TRAZODONE HYDROCHLORIDE 25 MG: 50 TABLET ORAL at 23:30

## 2023-01-01 RX ADMIN — LEVETIRACETAM 500 MG: 100 INJECTION INTRAVENOUS at 09:10

## 2023-01-01 RX ADMIN — DEXTROSE MONOHYDRATE 25 ML: 25 INJECTION, SOLUTION INTRAVENOUS at 19:00

## 2023-01-01 RX ADMIN — SODIUM CHLORIDE, PRESERVATIVE FREE 10 ML: 5 INJECTION INTRAVENOUS at 21:15

## 2023-01-01 RX ADMIN — MILRINONE LACTATE IN DEXTROSE 0.3 MCG/KG/MIN: 200 INJECTION, SOLUTION INTRAVENOUS at 22:02

## 2023-01-01 RX ADMIN — Medication 500 UNITS: at 09:42

## 2023-01-01 RX ADMIN — CALCIUM CHLORIDE, MAGNESIUM CHLORIDE, DEXTROSE MONOHYDRATE, LACTIC ACID, SODIUM CHLORIDE, SODIUM BICARBONATE AND POTASSIUM CHLORIDE: 3.68; 3.05; 22; 5.4; 6.46; 3.09; .314 INJECTION INTRAVENOUS at 18:48

## 2023-01-01 RX ADMIN — CALCIUM CHLORIDE, MAGNESIUM CHLORIDE, DEXTROSE MONOHYDRATE, LACTIC ACID, SODIUM CHLORIDE, SODIUM BICARBONATE AND POTASSIUM CHLORIDE: 5.15; 2.03; 22; 5.4; 6.46; 3.09; .157 INJECTION INTRAVENOUS at 05:53

## 2023-01-01 RX ADMIN — CALCIUM CHLORIDE, MAGNESIUM CHLORIDE, DEXTROSE MONOHYDRATE, LACTIC ACID, SODIUM CHLORIDE, SODIUM BICARBONATE AND POTASSIUM CHLORIDE: 3.68; 3.05; 22; 5.4; 6.46; 3.09; .314 INJECTION INTRAVENOUS at 06:28

## 2023-01-01 RX ADMIN — APIXABAN 10 MG: 5 TABLET, FILM COATED ORAL at 18:10

## 2023-01-01 RX ADMIN — LORAZEPAM 1 MG: 0.5 TABLET ORAL at 23:05

## 2023-01-01 RX ADMIN — Medication 1 UNITS: at 00:40

## 2023-01-01 RX ADMIN — APIXABAN 2.5 MG: 2.5 TABLET, FILM COATED ORAL at 09:41

## 2023-01-01 RX ADMIN — SODIUM CHLORIDE, PRESERVATIVE FREE 40 MG: 5 INJECTION INTRAVENOUS at 08:49

## 2023-01-01 RX ADMIN — LEVETIRACETAM 500 MG: 100 INJECTION, SOLUTION, CONCENTRATE INTRAVENOUS at 14:43

## 2023-01-01 RX ADMIN — DEXMEDETOMIDINE HYDROCHLORIDE 0.2 MCG/KG/HR: 4 INJECTION, SOLUTION INTRAVENOUS at 18:31

## 2023-01-01 RX ADMIN — ALBUMIN (HUMAN) 12.5 G: 0.25 INJECTION, SOLUTION INTRAVENOUS at 00:41

## 2023-01-01 RX ADMIN — Medication: at 09:15

## 2023-01-01 RX ADMIN — HYDROMORPHONE HYDROCHLORIDE 0.5 MG: 1 INJECTION, SOLUTION INTRAMUSCULAR; INTRAVENOUS; SUBCUTANEOUS at 16:17

## 2023-01-01 RX ADMIN — DEXMEDETOMIDINE HYDROCHLORIDE 1.5 MCG/KG/HR: 4 INJECTION, SOLUTION INTRAVENOUS at 22:48

## 2023-01-01 RX ADMIN — PIPERACILLIN AND TAZOBACTAM 3.38 G: 3; .375 INJECTION, POWDER, FOR SOLUTION INTRAVENOUS at 12:05

## 2023-01-01 RX ADMIN — CALCIUM CHLORIDE, MAGNESIUM CHLORIDE, DEXTROSE MONOHYDRATE, LACTIC ACID, SODIUM CHLORIDE, SODIUM BICARBONATE AND POTASSIUM CHLORIDE: 3.68; 3.05; 22; 5.4; 6.46; 3.09; .314 INJECTION INTRAVENOUS at 01:23

## 2023-01-01 RX ADMIN — VANCOMYCIN HYDROCHLORIDE 750 MG: 750 INJECTION, POWDER, LYOPHILIZED, FOR SOLUTION INTRAVENOUS at 15:31

## 2023-01-01 RX ADMIN — NOREPINEPHRINE BITARTRATE 2 MCG/MIN: 1 INJECTION, SOLUTION, CONCENTRATE INTRAVENOUS at 06:10

## 2023-01-01 RX ADMIN — PROPOFOL 15 MCG/KG/MIN: 10 INJECTION, EMULSION INTRAVENOUS at 02:33

## 2023-01-01 RX ADMIN — ZOLPIDEM TARTRATE 5 MG: 5 TABLET ORAL at 22:18

## 2023-01-01 RX ADMIN — POTASSIUM CHLORIDE 20 MEQ: 29.8 INJECTION, SOLUTION INTRAVENOUS at 05:47

## 2023-01-01 RX ADMIN — PROPOFOL 35 MCG/KG/MIN: 10 INJECTION, EMULSION INTRAVENOUS at 07:18

## 2023-01-01 RX ADMIN — BUMETANIDE 1 MG: 1 TABLET ORAL at 18:38

## 2023-01-01 RX ADMIN — APIXABAN 5 MG: 5 TABLET, FILM COATED ORAL at 08:52

## 2023-01-01 RX ADMIN — Medication 0.5 MG/HR: at 09:18

## 2023-01-01 RX ADMIN — CINACALCET HYDROCHLORIDE 60 MG: 30 TABLET, FILM COATED ORAL at 09:31

## 2023-01-01 RX ADMIN — SODIUM CHLORIDE, PRESERVATIVE FREE 10 ML: 5 INJECTION INTRAVENOUS at 05:36

## 2023-01-01 RX ADMIN — AMIODARONE HYDROCHLORIDE 400 MG: 200 TABLET ORAL at 08:29

## 2023-01-01 RX ADMIN — ALBUMIN (HUMAN) 12.5 G: 0.25 INJECTION, SOLUTION INTRAVENOUS at 17:45

## 2023-01-01 RX ADMIN — Medication: at 08:41

## 2023-01-01 RX ADMIN — NEOMYCIN SULFATE, POLYMYXIN B SULFATE, AND DEXAMETHASONE: 3.5; 10000; 1 OINTMENT OPHTHALMIC at 08:32

## 2023-01-01 RX ADMIN — ASPIRIN 81 MG: 81 TABLET, COATED ORAL at 08:28

## 2023-01-01 RX ADMIN — SODIUM CHLORIDE 10 ML/HR: 4.5 INJECTION, SOLUTION INTRAVENOUS at 03:35

## 2023-01-01 RX ADMIN — Medication: at 08:59

## 2023-01-01 RX ADMIN — ALBUMIN (HUMAN) 12.5 G: 0.25 INJECTION, SOLUTION INTRAVENOUS at 21:14

## 2023-01-01 RX ADMIN — HEPARIN SODIUM 400 UNITS/HR: 10000 INJECTION, SOLUTION INTRAVENOUS at 03:43

## 2023-01-01 RX ADMIN — FENTANYL CITRATE 100 MCG/HR: 0.05 INJECTION, SOLUTION INTRAMUSCULAR; INTRAVENOUS at 10:49

## 2023-01-01 RX ADMIN — DEXMEDETOMIDINE HYDROCHLORIDE 1.5 MCG/KG/HR: 4 INJECTION, SOLUTION INTRAVENOUS at 21:43

## 2023-01-01 RX ADMIN — POTASSIUM CHLORIDE 20 MEQ: 750 TABLET, FILM COATED, EXTENDED RELEASE ORAL at 20:10

## 2023-01-01 RX ADMIN — HEPARIN SODIUM 4 UNITS/KG/HR: 10000 INJECTION, SOLUTION INTRAVENOUS at 07:54

## 2023-01-01 RX ADMIN — SODIUM CHLORIDE, PRESERVATIVE FREE 10 ML: 5 INJECTION INTRAVENOUS at 07:00

## 2023-01-01 RX ADMIN — Medication 10 UNITS: at 21:12

## 2023-01-01 RX ADMIN — NOREPINEPHRINE BITARTRATE 3 MCG/MIN: 1 INJECTION, SOLUTION, CONCENTRATE INTRAVENOUS at 15:10

## 2023-01-01 RX ADMIN — VANCOMYCIN HYDROCHLORIDE 750 MG: 750 INJECTION, POWDER, LYOPHILIZED, FOR SOLUTION INTRAVENOUS at 14:13

## 2023-01-01 RX ADMIN — DEXMEDETOMIDINE HYDROCHLORIDE 1.5 MCG/KG/HR: 4 INJECTION, SOLUTION INTRAVENOUS at 10:36

## 2023-01-01 RX ADMIN — Medication 9 UNITS: at 17:14

## 2023-01-01 RX ADMIN — CALCIUM CHLORIDE, MAGNESIUM CHLORIDE, DEXTROSE MONOHYDRATE, LACTIC ACID, SODIUM CHLORIDE, SODIUM BICARBONATE AND POTASSIUM CHLORIDE: 3.68; 3.05; 22; 5.4; 6.46; 3.09; .314 INJECTION INTRAVENOUS at 00:00

## 2023-01-01 RX ADMIN — METOPROLOL SUCCINATE 12.5 MG: 25 TABLET, EXTENDED RELEASE ORAL at 20:09

## 2023-01-01 RX ADMIN — DEXMEDETOMIDINE HYDROCHLORIDE 1 MCG/KG/HR: 4 INJECTION, SOLUTION INTRAVENOUS at 01:39

## 2023-01-01 RX ADMIN — CALCIUM CHLORIDE, MAGNESIUM CHLORIDE, DEXTROSE MONOHYDRATE, LACTIC ACID, SODIUM CHLORIDE, SODIUM BICARBONATE AND POTASSIUM CHLORIDE: 3.68; 3.05; 22; 5.4; 6.46; 3.09; .314 INJECTION INTRAVENOUS at 01:09

## 2023-01-01 RX ADMIN — PIPERACILLIN AND TAZOBACTAM 3.38 G: 3; .375 INJECTION, POWDER, FOR SOLUTION INTRAVENOUS at 05:24

## 2023-01-01 RX ADMIN — DEXTROSE MONOHYDRATE 50 ML: 100 INJECTION, SOLUTION INTRAVENOUS at 21:54

## 2023-01-01 RX ADMIN — DEXMEDETOMIDINE HYDROCHLORIDE 1.3 MCG/KG/HR: 4 INJECTION, SOLUTION INTRAVENOUS at 09:03

## 2023-01-01 RX ADMIN — Medication 1 MG/HR: at 15:22

## 2023-01-01 RX ADMIN — CALCIUM CHLORIDE, MAGNESIUM CHLORIDE, DEXTROSE MONOHYDRATE, LACTIC ACID, SODIUM CHLORIDE, SODIUM BICARBONATE AND POTASSIUM CHLORIDE: 3.68; 3.05; 22; 5.4; 6.46; 3.09; .314 INJECTION INTRAVENOUS at 05:13

## 2023-01-01 RX ADMIN — FLUCONAZOLE IN SODIUM CHLORIDE 200 MG: 2 INJECTION, SOLUTION INTRAVENOUS at 10:35

## 2023-01-01 RX ADMIN — ALBUMIN (HUMAN) 12.5 G: 0.25 INJECTION, SOLUTION INTRAVENOUS at 21:33

## 2023-01-01 RX ADMIN — CALCIUM CHLORIDE, MAGNESIUM CHLORIDE, DEXTROSE MONOHYDRATE, LACTIC ACID, SODIUM CHLORIDE, SODIUM BICARBONATE AND POTASSIUM CHLORIDE: 3.68; 3.05; 22; 5.4; 6.46; 3.09; .314 INJECTION INTRAVENOUS at 18:53

## 2023-01-01 RX ADMIN — ACETAMINOPHEN 650 MG: 650 SOLUTION ORAL at 04:01

## 2023-01-01 RX ADMIN — Medication: at 17:29

## 2023-01-01 RX ADMIN — LEVETIRACETAM 500 MG: 100 INJECTION, SOLUTION, CONCENTRATE INTRAVENOUS at 05:32

## 2023-01-01 RX ADMIN — FLUCONAZOLE IN SODIUM CHLORIDE 400 MG: 2 INJECTION, SOLUTION INTRAVENOUS at 11:39

## 2023-01-01 RX ADMIN — HYDROMORPHONE HYDROCHLORIDE 0.5 MG: 1 INJECTION, SOLUTION INTRAMUSCULAR; INTRAVENOUS; SUBCUTANEOUS at 14:45

## 2023-01-01 RX ADMIN — Medication 2 UNITS: at 05:32

## 2023-01-01 RX ADMIN — LEVETIRACETAM 500 MG: 100 INJECTION INTRAVENOUS at 08:17

## 2023-01-01 RX ADMIN — MILRINONE LACTATE IN DEXTROSE 0.2 MCG/KG/MIN: 200 INJECTION, SOLUTION INTRAVENOUS at 06:32

## 2023-01-01 RX ADMIN — PROPOFOL 10 MCG/KG/MIN: 10 INJECTION, EMULSION INTRAVENOUS at 15:17

## 2023-01-01 RX ADMIN — SODIUM CHLORIDE, PRESERVATIVE FREE 10 ML: 5 INJECTION INTRAVENOUS at 05:24

## 2023-01-01 RX ADMIN — Medication 2 UNITS: at 17:39

## 2023-01-01 RX ADMIN — CALCIUM CHLORIDE, MAGNESIUM CHLORIDE, DEXTROSE MONOHYDRATE, LACTIC ACID, SODIUM CHLORIDE, SODIUM BICARBONATE AND POTASSIUM CHLORIDE 1700 ML/HR: 5.15; 2.03; 22; 5.4; 6.46; 3.09; .157 INJECTION INTRAVENOUS at 19:27

## 2023-01-01 RX ADMIN — MAGNESIUM OXIDE 400 MG (241.3 MG MAGNESIUM) TABLET 400 MG: TABLET at 09:05

## 2023-01-01 RX ADMIN — Medication 3 UNITS: at 17:29

## 2023-01-01 RX ADMIN — HYDRALAZINE HYDROCHLORIDE 5 MG: 20 INJECTION INTRAMUSCULAR; INTRAVENOUS at 07:11

## 2023-01-01 RX ADMIN — PIPERACILLIN AND TAZOBACTAM 3.38 G: 3; .375 INJECTION, POWDER, FOR SOLUTION INTRAVENOUS at 13:08

## 2023-01-01 RX ADMIN — LEVETIRACETAM 500 MG: 100 INJECTION INTRAVENOUS at 21:43

## 2023-01-01 RX ADMIN — AMIODARONE HYDROCHLORIDE 400 MG: 200 TABLET ORAL at 19:14

## 2023-01-01 RX ADMIN — Medication 2 UNITS: at 19:23

## 2023-01-01 RX ADMIN — Medication 1 MG/HR: at 11:03

## 2023-01-01 RX ADMIN — ASPIRIN 81 MG CHEWABLE TABLET 81 MG: 81 TABLET CHEWABLE at 09:30

## 2023-01-01 RX ADMIN — MIDAZOLAM 1 MG: 1 INJECTION INTRAMUSCULAR; INTRAVENOUS at 07:10

## 2023-01-01 RX ADMIN — Medication: at 20:03

## 2023-01-01 RX ADMIN — ACETAMINOPHEN 650 MG: 325 TABLET ORAL at 10:19

## 2023-01-01 RX ADMIN — PIPERACILLIN AND TAZOBACTAM 3.38 G: 3; .375 INJECTION, POWDER, FOR SOLUTION INTRAVENOUS at 13:14

## 2023-01-01 RX ADMIN — ALBUMIN (HUMAN) 12.5 G: 0.25 INJECTION, SOLUTION INTRAVENOUS at 08:54

## 2023-01-01 RX ADMIN — EPOETIN ALFA-EPBX 10000 UNITS: 10000 INJECTION, SOLUTION INTRAVENOUS; SUBCUTANEOUS at 20:53

## 2023-01-01 RX ADMIN — LEVETIRACETAM 500 MG: 100 INJECTION, SOLUTION, CONCENTRATE INTRAVENOUS at 02:16

## 2023-01-01 RX ADMIN — CINACALCET HYDROCHLORIDE 90 MG: 30 TABLET, FILM COATED ORAL at 08:14

## 2023-01-01 RX ADMIN — Medication: at 09:42

## 2023-01-01 RX ADMIN — VANCOMYCIN HYDROCHLORIDE 1000 MG: 1 INJECTION, POWDER, LYOPHILIZED, FOR SOLUTION INTRAVENOUS at 11:42

## 2023-01-01 RX ADMIN — Medication: at 09:07

## 2023-01-01 RX ADMIN — ONDANSETRON HYDROCHLORIDE 4 MG: 2 SOLUTION INTRAMUSCULAR; INTRAVENOUS at 12:33

## 2023-01-01 RX ADMIN — SODIUM CHLORIDE, PRESERVATIVE FREE 10 ML: 5 INJECTION INTRAVENOUS at 21:06

## 2023-01-01 RX ADMIN — DEXMEDETOMIDINE HYDROCHLORIDE 1.5 MCG/KG/HR: 4 INJECTION, SOLUTION INTRAVENOUS at 10:17

## 2023-01-01 RX ADMIN — HEPARIN SODIUM 400 UNITS/HR: 10000 INJECTION, SOLUTION INTRAVENOUS at 08:11

## 2023-01-01 RX ADMIN — DOBUTAMINE HYDROCHLORIDE 2 MCG/KG/MIN: 200 INJECTION INTRAVENOUS at 17:29

## 2023-01-01 RX ADMIN — DEXMEDETOMIDINE HYDROCHLORIDE 1.5 MCG/KG/HR: 4 INJECTION, SOLUTION INTRAVENOUS at 02:59

## 2023-01-01 RX ADMIN — ROSUVASTATIN 20 MG: 10 TABLET, FILM COATED ORAL at 22:00

## 2023-01-01 RX ADMIN — FENTANYL CITRATE 200 MCG: 50 INJECTION, SOLUTION INTRAMUSCULAR; INTRAVENOUS at 14:04

## 2023-01-01 RX ADMIN — Medication 5 UNITS: at 21:09

## 2023-01-01 RX ADMIN — MEROPENEM 1 G: 1 INJECTION, POWDER, FOR SOLUTION INTRAVENOUS at 17:32

## 2023-01-01 RX ADMIN — PANTOPRAZOLE SODIUM 40 MG: 40 TABLET, DELAYED RELEASE ORAL at 07:03

## 2023-01-01 RX ADMIN — ALBUMIN (HUMAN) 12.5 G: 12.5 INJECTION, SOLUTION INTRAVENOUS at 20:53

## 2023-01-01 RX ADMIN — METRONIDAZOLE 500 MG: 500 INJECTION, SOLUTION INTRAVENOUS at 08:28

## 2023-01-01 RX ADMIN — SODIUM CHLORIDE, PRESERVATIVE FREE 10 ML: 5 INJECTION INTRAVENOUS at 05:19

## 2023-01-01 RX ADMIN — ALBUMIN (HUMAN) 25 G: 12.5 INJECTION, SOLUTION INTRAVENOUS at 22:25

## 2023-01-01 RX ADMIN — CALCIUM CHLORIDE, MAGNESIUM CHLORIDE, DEXTROSE MONOHYDRATE, LACTIC ACID, SODIUM CHLORIDE, SODIUM BICARBONATE AND POTASSIUM CHLORIDE: 3.68; 3.05; 22; 5.4; 6.46; 3.09; .314 INJECTION INTRAVENOUS at 11:38

## 2023-01-01 RX ADMIN — ALTEPLASE 3 MG: 2.2 INJECTION, POWDER, LYOPHILIZED, FOR SOLUTION INTRAVENOUS at 10:21

## 2023-01-01 RX ADMIN — ALBUMIN HUMAN 12.5 G: 50 SOLUTION INTRAVENOUS at 00:57

## 2023-01-01 RX ADMIN — APIXABAN 5 MG: 5 TABLET, FILM COATED ORAL at 17:03

## 2023-01-01 RX ADMIN — SODIUM CHLORIDE, PRESERVATIVE FREE 10 ML: 5 INJECTION INTRAVENOUS at 22:27

## 2023-01-01 RX ADMIN — PROPOFOL 35 MCG/KG/MIN: 10 INJECTION, EMULSION INTRAVENOUS at 10:21

## 2023-01-01 RX ADMIN — AMIODARONE HYDROCHLORIDE 400 MG: 200 TABLET ORAL at 17:29

## 2023-01-01 RX ADMIN — ALBUMIN (HUMAN) 12.5 G: 0.25 INJECTION, SOLUTION INTRAVENOUS at 05:33

## 2023-01-01 RX ADMIN — SODIUM CHLORIDE 11 ML/HR: 9 INJECTION, SOLUTION INTRAVENOUS at 20:03

## 2023-01-01 RX ADMIN — Medication: at 09:05

## 2023-01-01 RX ADMIN — WARFARIN SODIUM 1 MG: 1 TABLET ORAL at 21:05

## 2023-01-01 RX ADMIN — Medication 8 UNITS: at 08:18

## 2023-01-01 RX ADMIN — INSULIN GLARGINE 44 UNITS: 100 INJECTION, SOLUTION SUBCUTANEOUS at 08:07

## 2023-01-01 RX ADMIN — NEOMYCIN SULFATE, POLYMYXIN B SULFATE, AND DEXAMETHASONE: 3.5; 10000; 1 OINTMENT OPHTHALMIC at 17:15

## 2023-01-01 RX ADMIN — CALCIUM CHLORIDE, MAGNESIUM CHLORIDE, DEXTROSE MONOHYDRATE, LACTIC ACID, SODIUM CHLORIDE, SODIUM BICARBONATE AND POTASSIUM CHLORIDE: 3.68; 3.05; 22; 5.4; 6.46; 3.09; .314 INJECTION INTRAVENOUS at 12:47

## 2023-01-01 RX ADMIN — DEXMEDETOMIDINE HYDROCHLORIDE 0.2 MCG/KG/HR: 4 INJECTION, SOLUTION INTRAVENOUS at 20:10

## 2023-01-01 RX ADMIN — HYDROMORPHONE HYDROCHLORIDE 1 MG: 1 INJECTION, SOLUTION INTRAMUSCULAR; INTRAVENOUS; SUBCUTANEOUS at 11:35

## 2023-01-01 RX ADMIN — ALBUMIN (HUMAN) 12.5 G: 0.25 INJECTION, SOLUTION INTRAVENOUS at 21:02

## 2023-01-01 RX ADMIN — LEVETIRACETAM 500 MG: 100 INJECTION, SOLUTION, CONCENTRATE INTRAVENOUS at 13:59

## 2023-01-01 RX ADMIN — OXYCODONE HYDROCHLORIDE 5 MG: 5 TABLET ORAL at 09:23

## 2023-01-01 RX ADMIN — SODIUM CHLORIDE, PRESERVATIVE FREE 10 ML: 5 INJECTION INTRAVENOUS at 21:29

## 2023-01-01 RX ADMIN — ROSUVASTATIN 20 MG: 10 TABLET, FILM COATED ORAL at 21:47

## 2023-01-01 RX ADMIN — SODIUM CHLORIDE, PRESERVATIVE FREE 10 ML: 5 INJECTION INTRAVENOUS at 21:42

## 2023-01-01 RX ADMIN — OXYCODONE HYDROCHLORIDE 5 MG: 5 TABLET ORAL at 12:44

## 2023-01-01 RX ADMIN — ALBUMIN (HUMAN) 12.5 G: 0.25 INJECTION, SOLUTION INTRAVENOUS at 08:51

## 2023-01-01 RX ADMIN — ROSUVASTATIN 20 MG: 10 TABLET, FILM COATED ORAL at 22:31

## 2023-01-01 RX ADMIN — ASPIRIN 81 MG CHEWABLE TABLET 81 MG: 81 TABLET CHEWABLE at 08:54

## 2023-01-01 RX ADMIN — CINACALCET HYDROCHLORIDE 30 MG: 30 TABLET, FILM COATED ORAL at 08:25

## 2023-01-01 RX ADMIN — SODIUM CHLORIDE, PRESERVATIVE FREE 10 ML: 5 INJECTION INTRAVENOUS at 22:00

## 2023-01-01 RX ADMIN — ACETAMINOPHEN 650 MG: 650 SOLUTION ORAL at 09:42

## 2023-01-01 RX ADMIN — NEOMYCIN SULFATE, POLYMYXIN B SULFATE, AND DEXAMETHASONE: 3.5; 10000; 1 OINTMENT OPHTHALMIC at 17:10

## 2023-01-01 RX ADMIN — ALTEPLASE 3 MG: 2.2 INJECTION, POWDER, LYOPHILIZED, FOR SOLUTION INTRAVENOUS at 23:19

## 2023-01-01 RX ADMIN — SODIUM CHLORIDE, PRESERVATIVE FREE 10 ML: 5 INJECTION INTRAVENOUS at 13:42

## 2023-01-01 RX ADMIN — ROSUVASTATIN 20 MG: 10 TABLET, FILM COATED ORAL at 22:58

## 2023-01-01 RX ADMIN — CALCIUM CHLORIDE, MAGNESIUM CHLORIDE, DEXTROSE MONOHYDRATE, LACTIC ACID, SODIUM CHLORIDE, SODIUM BICARBONATE AND POTASSIUM CHLORIDE: 3.68; 3.05; 22; 5.4; 6.46; 3.09; .314 INJECTION INTRAVENOUS at 23:45

## 2023-01-01 RX ADMIN — SODIUM CHLORIDE, PRESERVATIVE FREE 10 ML: 5 INJECTION INTRAVENOUS at 22:09

## 2023-01-01 RX ADMIN — SODIUM BICARBONATE: 84 INJECTION, SOLUTION INTRAVENOUS at 14:13

## 2023-01-01 RX ADMIN — ALBUMIN (HUMAN) 12.5 G: 0.25 INJECTION, SOLUTION INTRAVENOUS at 00:08

## 2023-01-01 RX ADMIN — HYDROMORPHONE HYDROCHLORIDE 1 MG: 1 INJECTION, SOLUTION INTRAMUSCULAR; INTRAVENOUS; SUBCUTANEOUS at 21:56

## 2023-01-01 RX ADMIN — ALBUMIN (HUMAN) 12.5 G: 0.25 INJECTION, SOLUTION INTRAVENOUS at 20:56

## 2023-01-01 RX ADMIN — Medication 2 UNITS: at 16:26

## 2023-01-01 RX ADMIN — PIPERACILLIN AND TAZOBACTAM 3.38 G: 3; .375 INJECTION, POWDER, FOR SOLUTION INTRAVENOUS at 05:13

## 2023-01-01 RX ADMIN — DOBUTAMINE HYDROCHLORIDE 2 MCG/KG/MIN: 200 INJECTION INTRAVENOUS at 06:33

## 2023-01-01 RX ADMIN — Medication: at 15:44

## 2023-01-01 RX ADMIN — SODIUM CHLORIDE, PRESERVATIVE FREE 40 MG: 5 INJECTION INTRAVENOUS at 08:50

## 2023-01-01 RX ADMIN — SODIUM CHLORIDE, PRESERVATIVE FREE 10 ML: 5 INJECTION INTRAVENOUS at 16:49

## 2023-01-01 RX ADMIN — ASPIRIN 81 MG CHEWABLE TABLET 81 MG: 81 TABLET CHEWABLE at 09:25

## 2023-01-01 RX ADMIN — SODIUM CHLORIDE, PRESERVATIVE FREE 10 ML: 5 INJECTION INTRAVENOUS at 21:32

## 2023-01-01 RX ADMIN — APIXABAN 10 MG: 5 TABLET, FILM COATED ORAL at 09:01

## 2023-01-01 RX ADMIN — Medication 5 UNITS: at 06:29

## 2023-01-01 RX ADMIN — CALCIUM CHLORIDE, MAGNESIUM CHLORIDE, DEXTROSE MONOHYDRATE, LACTIC ACID, SODIUM CHLORIDE, SODIUM BICARBONATE AND POTASSIUM CHLORIDE: 3.68; 3.05; 22; 5.4; 6.46; 3.09; .314 INJECTION INTRAVENOUS at 17:25

## 2023-01-01 RX ADMIN — NOREPINEPHRINE BITARTRATE 6 MCG/MIN: 1 INJECTION, SOLUTION, CONCENTRATE INTRAVENOUS at 15:21

## 2023-01-01 RX ADMIN — CALCIUM CHLORIDE, MAGNESIUM CHLORIDE, DEXTROSE MONOHYDRATE, LACTIC ACID, SODIUM CHLORIDE, SODIUM BICARBONATE AND POTASSIUM CHLORIDE: 3.68; 3.05; 22; 5.4; 6.46; 3.09; .314 INJECTION INTRAVENOUS at 16:03

## 2023-01-01 RX ADMIN — Medication: at 18:25

## 2023-01-01 RX ADMIN — ALBUMIN (HUMAN) 12.5 G: 0.25 INJECTION, SOLUTION INTRAVENOUS at 05:01

## 2023-01-01 RX ADMIN — SODIUM CHLORIDE, PRESERVATIVE FREE 10 ML: 5 INJECTION INTRAVENOUS at 05:05

## 2023-01-01 RX ADMIN — Medication: at 18:14

## 2023-01-01 RX ADMIN — CALCIUM CHLORIDE, MAGNESIUM CHLORIDE, DEXTROSE MONOHYDRATE, LACTIC ACID, SODIUM CHLORIDE, SODIUM BICARBONATE AND POTASSIUM CHLORIDE: 5.15; 2.03; 22; 5.4; 6.46; 3.09; .157 INJECTION INTRAVENOUS at 20:51

## 2023-01-01 RX ADMIN — SODIUM CHLORIDE, PRESERVATIVE FREE 10 ML: 5 INJECTION INTRAVENOUS at 13:54

## 2023-01-01 RX ADMIN — ALBUMIN (HUMAN) 25 G: 0.25 INJECTION, SOLUTION INTRAVENOUS at 14:42

## 2023-01-01 RX ADMIN — FUROSEMIDE 40 MG: 10 INJECTION, SOLUTION INTRAMUSCULAR; INTRAVENOUS at 22:19

## 2023-01-01 RX ADMIN — Medication 200 MCG: at 00:40

## 2023-01-01 RX ADMIN — ZOLPIDEM TARTRATE 5 MG: 5 TABLET ORAL at 21:27

## 2023-01-01 RX ADMIN — PROPOFOL 40 MCG/KG/MIN: 10 INJECTION, EMULSION INTRAVENOUS at 04:58

## 2023-01-01 RX ADMIN — VANCOMYCIN HYDROCHLORIDE 750 MG: 750 INJECTION, POWDER, LYOPHILIZED, FOR SOLUTION INTRAVENOUS at 15:00

## 2023-01-01 RX ADMIN — MEROPENEM 1 G: 1 INJECTION, POWDER, FOR SOLUTION INTRAVENOUS at 06:02

## 2023-01-01 RX ADMIN — SODIUM CHLORIDE, PRESERVATIVE FREE 10 ML: 5 INJECTION INTRAVENOUS at 00:09

## 2023-01-01 RX ADMIN — CALCIUM CHLORIDE, MAGNESIUM CHLORIDE, DEXTROSE MONOHYDRATE, LACTIC ACID, SODIUM CHLORIDE, SODIUM BICARBONATE AND POTASSIUM CHLORIDE: 3.68; 3.05; 22; 5.4; 6.46; 3.09; .314 INJECTION INTRAVENOUS at 06:21

## 2023-01-01 RX ADMIN — NEOMYCIN SULFATE, POLYMYXIN B SULFATE, AND DEXAMETHASONE: 3.5; 10000; 1 OINTMENT OPHTHALMIC at 17:46

## 2023-01-01 RX ADMIN — NEOMYCIN SULFATE, POLYMYXIN B SULFATE, AND DEXAMETHASONE: 3.5; 10000; 1 OINTMENT OPHTHALMIC at 17:19

## 2023-01-01 RX ADMIN — SODIUM CHLORIDE, PRESERVATIVE FREE 1 G: 5 INJECTION INTRAVENOUS at 12:45

## 2023-01-01 RX ADMIN — ALBUMIN (HUMAN) 12.5 G: 0.25 INJECTION, SOLUTION INTRAVENOUS at 00:14

## 2023-01-01 RX ADMIN — COLCHICINE 0.6 MG: 0.6 TABLET, FILM COATED ORAL at 13:59

## 2023-01-01 RX ADMIN — OXYCODONE HYDROCHLORIDE 10 MG: 5 TABLET ORAL at 17:51

## 2023-01-01 RX ADMIN — FENTANYL CITRATE 25 MCG: 50 INJECTION, SOLUTION INTRAMUSCULAR; INTRAVENOUS at 04:29

## 2023-01-01 RX ADMIN — DOXYCYCLINE 100 MG: 100 INJECTION, POWDER, LYOPHILIZED, FOR SOLUTION INTRAVENOUS at 13:23

## 2023-01-01 RX ADMIN — SODIUM CHLORIDE, PRESERVATIVE FREE 40 MG: 5 INJECTION INTRAVENOUS at 08:17

## 2023-01-01 RX ADMIN — AMIODARONE HYDROCHLORIDE 400 MG: 200 TABLET ORAL at 09:05

## 2023-01-01 RX ADMIN — SODIUM CHLORIDE, PRESERVATIVE FREE 10 ML: 5 INJECTION INTRAVENOUS at 16:26

## 2023-01-01 RX ADMIN — ASPIRIN 81 MG CHEWABLE TABLET 81 MG: 81 TABLET CHEWABLE at 08:56

## 2023-01-01 RX ADMIN — HYDROMORPHONE HYDROCHLORIDE 0.5 MG: 1 INJECTION, SOLUTION INTRAMUSCULAR; INTRAVENOUS; SUBCUTANEOUS at 22:32

## 2023-01-01 RX ADMIN — SENNOSIDES AND DOCUSATE SODIUM 1 TABLET: 50; 8.6 TABLET ORAL at 08:22

## 2023-01-01 RX ADMIN — LEVETIRACETAM 500 MG: 100 INJECTION INTRAVENOUS at 21:08

## 2023-01-01 RX ADMIN — CALCIUM CHLORIDE, MAGNESIUM CHLORIDE, DEXTROSE MONOHYDRATE, LACTIC ACID, SODIUM CHLORIDE, SODIUM BICARBONATE AND POTASSIUM CHLORIDE: 5.15; 2.03; 22; 5.4; 6.46; 3.09; .157 INJECTION INTRAVENOUS at 14:44

## 2023-01-01 RX ADMIN — Medication 20 UNITS: at 08:24

## 2023-01-01 RX ADMIN — Medication 4 UNITS: at 12:24

## 2023-01-01 RX ADMIN — EPINEPHRINE 3 MCG/MIN: 1 INJECTION INTRAMUSCULAR; INTRAVENOUS; SUBCUTANEOUS at 05:41

## 2023-01-01 RX ADMIN — COLCHICINE 0.6 MG: 0.6 TABLET, FILM COATED ORAL at 08:22

## 2023-01-01 RX ADMIN — WARFARIN SODIUM 2.5 MG: 2.5 TABLET ORAL at 17:51

## 2023-01-01 RX ADMIN — ALBUMIN (HUMAN) 12.5 G: 0.25 INJECTION, SOLUTION INTRAVENOUS at 05:44

## 2023-01-01 RX ADMIN — COLCHICINE 0.6 MG: 0.6 TABLET, FILM COATED ORAL at 14:06

## 2023-01-01 RX ADMIN — SODIUM CHLORIDE 6 ML/HR: 9 INJECTION, SOLUTION INTRAVENOUS at 05:46

## 2023-01-01 RX ADMIN — CALCIUM CHLORIDE, MAGNESIUM CHLORIDE, DEXTROSE MONOHYDRATE, LACTIC ACID, SODIUM CHLORIDE, SODIUM BICARBONATE AND POTASSIUM CHLORIDE: 3.68; 3.05; 22; 5.4; 6.46; 3.09; .314 INJECTION INTRAVENOUS at 02:03

## 2023-01-01 RX ADMIN — CINACALCET HYDROCHLORIDE 90 MG: 30 TABLET, FILM COATED ORAL at 16:58

## 2023-01-01 RX ADMIN — SUCRALFATE 1 G: 1 TABLET ORAL at 12:53

## 2023-01-01 RX ADMIN — Medication 500 UNITS: at 09:53

## 2023-01-01 RX ADMIN — PIPERACILLIN AND TAZOBACTAM 3.38 G: 3; .375 INJECTION, POWDER, FOR SOLUTION INTRAVENOUS at 20:47

## 2023-01-01 RX ADMIN — SODIUM CHLORIDE, PRESERVATIVE FREE 40 MG: 5 INJECTION INTRAVENOUS at 09:26

## 2023-01-01 RX ADMIN — ALBUMIN (HUMAN) 12.5 G: 0.25 INJECTION, SOLUTION INTRAVENOUS at 06:22

## 2023-01-01 RX ADMIN — HYDROMORPHONE HYDROCHLORIDE 0.5 MG: 1 INJECTION, SOLUTION INTRAMUSCULAR; INTRAVENOUS; SUBCUTANEOUS at 09:45

## 2023-01-01 RX ADMIN — HEPARIN SODIUM 8 UNITS/KG/HR: 10000 INJECTION, SOLUTION INTRAVENOUS at 16:59

## 2023-01-01 RX ADMIN — ALBUMIN (HUMAN) 12.5 G: 0.25 INJECTION, SOLUTION INTRAVENOUS at 11:07

## 2023-01-01 RX ADMIN — Medication: at 18:08

## 2023-01-01 RX ADMIN — PIPERACILLIN AND TAZOBACTAM 3.38 G: 3; .375 INJECTION, POWDER, FOR SOLUTION INTRAVENOUS at 21:28

## 2023-01-01 RX ADMIN — Medication: at 18:00

## 2023-01-01 RX ADMIN — FLUCONAZOLE IN SODIUM CHLORIDE 400 MG: 2 INJECTION, SOLUTION INTRAVENOUS at 10:46

## 2023-01-01 RX ADMIN — EPOETIN ALFA-EPBX 10000 UNITS: 10000 INJECTION, SOLUTION INTRAVENOUS; SUBCUTANEOUS at 20:50

## 2023-01-01 RX ADMIN — CALCIUM CHLORIDE, MAGNESIUM CHLORIDE, DEXTROSE MONOHYDRATE, LACTIC ACID, SODIUM CHLORIDE, SODIUM BICARBONATE AND POTASSIUM CHLORIDE: 3.68; 3.05; 22; 5.4; 6.46; 3.09; .314 INJECTION INTRAVENOUS at 05:22

## 2023-01-01 RX ADMIN — Medication 7 UNITS: at 18:58

## 2023-01-01 RX ADMIN — ALBUMIN (HUMAN) 12.5 G: 0.25 INJECTION, SOLUTION INTRAVENOUS at 09:42

## 2023-01-01 RX ADMIN — CALCIUM CHLORIDE, MAGNESIUM CHLORIDE, DEXTROSE MONOHYDRATE, LACTIC ACID, SODIUM CHLORIDE, SODIUM BICARBONATE AND POTASSIUM CHLORIDE: 3.68; 3.05; 22; 5.4; 6.46; 3.09; .314 INJECTION INTRAVENOUS at 12:02

## 2023-01-01 RX ADMIN — VANCOMYCIN HYDROCHLORIDE 750 MG: 750 INJECTION, POWDER, LYOPHILIZED, FOR SOLUTION INTRAVENOUS at 16:41

## 2023-01-01 RX ADMIN — ALBUMIN (HUMAN) 12.5 G: 0.25 INJECTION, SOLUTION INTRAVENOUS at 08:45

## 2023-01-01 RX ADMIN — HYDROMORPHONE HYDROCHLORIDE 1 MG: 1 INJECTION, SOLUTION INTRAMUSCULAR; INTRAVENOUS; SUBCUTANEOUS at 04:33

## 2023-01-01 RX ADMIN — SODIUM CHLORIDE 1.5 UNITS/HR: 9 INJECTION, SOLUTION INTRAVENOUS at 05:36

## 2023-01-01 RX ADMIN — CINACALCET HYDROCHLORIDE 60 MG: 30 TABLET, FILM COATED ORAL at 10:32

## 2023-01-01 RX ADMIN — EPINEPHRINE 5 MCG/MIN: 1 INJECTION INTRAMUSCULAR; INTRAVENOUS; SUBCUTANEOUS at 10:13

## 2023-01-01 RX ADMIN — FENTANYL CITRATE 150 MCG/HR: 0.05 INJECTION, SOLUTION INTRAMUSCULAR; INTRAVENOUS at 16:37

## 2023-01-01 RX ADMIN — PROPOFOL 20 MCG/KG/MIN: 10 INJECTION, EMULSION INTRAVENOUS at 13:29

## 2023-01-01 RX ADMIN — HEPARIN SODIUM 2000 UNITS: 1000 INJECTION INTRAVENOUS; SUBCUTANEOUS at 03:04

## 2023-01-01 RX ADMIN — MEROPENEM 1 G: 1 INJECTION, POWDER, FOR SOLUTION INTRAVENOUS at 17:53

## 2023-01-01 RX ADMIN — DEXMEDETOMIDINE HYDROCHLORIDE 1 MCG/KG/HR: 4 INJECTION, SOLUTION INTRAVENOUS at 10:40

## 2023-01-01 RX ADMIN — ALBUMIN (HUMAN) 12.5 G: 0.25 INJECTION, SOLUTION INTRAVENOUS at 11:18

## 2023-01-01 RX ADMIN — Medication 8 UNITS: at 17:39

## 2023-01-01 RX ADMIN — DEXMEDETOMIDINE HYDROCHLORIDE 0.5 MCG/KG/HR: 4 INJECTION, SOLUTION INTRAVENOUS at 09:39

## 2023-01-01 RX ADMIN — CHLORHEXIDINE GLUCONATE 10 ML: 1.2 RINSE ORAL at 20:07

## 2023-01-01 RX ADMIN — SODIUM BICARBONATE 100 MEQ: 84 INJECTION INTRAVENOUS at 15:17

## 2023-01-01 RX ADMIN — CALCIUM CHLORIDE, MAGNESIUM CHLORIDE, DEXTROSE MONOHYDRATE, LACTIC ACID, SODIUM CHLORIDE, SODIUM BICARBONATE AND POTASSIUM CHLORIDE 1600 ML/HR: 3.68; 3.05; 22; 5.4; 6.46; 3.09; .314 INJECTION INTRAVENOUS at 15:23

## 2023-01-01 RX ADMIN — METOPROLOL SUCCINATE 12.5 MG: 25 TABLET, EXTENDED RELEASE ORAL at 09:00

## 2023-01-01 RX ADMIN — PIPERACILLIN AND TAZOBACTAM 3.38 G: 3; .375 INJECTION, POWDER, FOR SOLUTION INTRAVENOUS at 21:06

## 2023-01-01 RX ADMIN — ROSUVASTATIN 20 MG: 10 TABLET, FILM COATED ORAL at 22:32

## 2023-01-01 RX ADMIN — KETOROLAC TROMETHAMINE 15 MG: 30 INJECTION, SOLUTION INTRAMUSCULAR at 23:25

## 2023-01-01 RX ADMIN — DOBUTAMINE HYDROCHLORIDE 2 MCG/KG/MIN: 200 INJECTION INTRAVENOUS at 06:51

## 2023-01-01 RX ADMIN — SODIUM CHLORIDE, PRESERVATIVE FREE 10 ML: 5 INJECTION INTRAVENOUS at 21:48

## 2023-01-01 RX ADMIN — AMIODARONE HYDROCHLORIDE 1 MG/MIN: 50 INJECTION, SOLUTION INTRAVENOUS at 21:03

## 2023-01-01 RX ADMIN — PANTOPRAZOLE SODIUM 40 MG: 40 TABLET, DELAYED RELEASE ORAL at 07:10

## 2023-01-01 RX ADMIN — ROSUVASTATIN 20 MG: 10 TABLET, FILM COATED ORAL at 21:20

## 2023-01-01 RX ADMIN — HYDROMORPHONE HYDROCHLORIDE 0.5 MG: 1 INJECTION, SOLUTION INTRAMUSCULAR; INTRAVENOUS; SUBCUTANEOUS at 05:54

## 2023-01-01 RX ADMIN — LEVETIRACETAM 500 MG: 100 INJECTION INTRAVENOUS at 08:30

## 2023-01-01 RX ADMIN — HEPARIN SODIUM 1700 UNITS: 1000 INJECTION INTRAVENOUS; SUBCUTANEOUS at 14:35

## 2023-01-01 RX ADMIN — APIXABAN 10 MG: 5 TABLET, FILM COATED ORAL at 17:29

## 2023-01-01 RX ADMIN — Medication 2 UNITS: at 22:05

## 2023-01-01 RX ADMIN — SODIUM CHLORIDE 9 ML/HR: 9 INJECTION, SOLUTION INTRAVENOUS at 08:11

## 2023-01-01 RX ADMIN — CALCIUM CHLORIDE, MAGNESIUM CHLORIDE, DEXTROSE MONOHYDRATE, LACTIC ACID, SODIUM CHLORIDE, SODIUM BICARBONATE AND POTASSIUM CHLORIDE: 3.68; 3.05; 22; 5.4; 6.46; 3.09; .314 INJECTION INTRAVENOUS at 20:30

## 2023-01-01 RX ADMIN — METRONIDAZOLE 500 MG: 500 INJECTION, SOLUTION INTRAVENOUS at 08:40

## 2023-01-01 RX ADMIN — SODIUM CHLORIDE, PRESERVATIVE FREE 40 MG: 5 INJECTION INTRAVENOUS at 08:53

## 2023-01-01 RX ADMIN — Medication 6 UNITS: at 12:50

## 2023-01-01 RX ADMIN — PIPERACILLIN AND TAZOBACTAM 3.38 G: 3; .375 INJECTION, POWDER, FOR SOLUTION INTRAVENOUS at 05:39

## 2023-01-01 RX ADMIN — SODIUM CHLORIDE, PRESERVATIVE FREE 10 ML: 5 INJECTION INTRAVENOUS at 21:11

## 2023-01-01 RX ADMIN — PIPERACILLIN AND TAZOBACTAM 3.38 G: 3; .375 INJECTION, POWDER, FOR SOLUTION INTRAVENOUS at 05:32

## 2023-01-01 RX ADMIN — AMIODARONE HYDROCHLORIDE 400 MG: 200 TABLET ORAL at 08:54

## 2023-01-01 RX ADMIN — Medication 16 UNITS: at 13:39

## 2023-01-01 RX ADMIN — SODIUM CHLORIDE, PRESERVATIVE FREE 10 ML: 5 INJECTION INTRAVENOUS at 23:04

## 2023-01-01 RX ADMIN — AMIODARONE HYDROCHLORIDE 400 MG: 200 TABLET ORAL at 08:42

## 2023-01-01 RX ADMIN — HYDROMORPHONE HYDROCHLORIDE 0.5 MG: 1 INJECTION, SOLUTION INTRAMUSCULAR; INTRAVENOUS; SUBCUTANEOUS at 22:00

## 2023-01-01 RX ADMIN — ALBUMIN (HUMAN) 12.5 G: 0.25 INJECTION, SOLUTION INTRAVENOUS at 05:39

## 2023-01-01 RX ADMIN — SUCRALFATE 1 G: 1 TABLET ORAL at 22:13

## 2023-01-01 RX ADMIN — SENNOSIDES AND DOCUSATE SODIUM 1 TABLET: 50; 8.6 TABLET ORAL at 12:25

## 2023-01-01 RX ADMIN — SODIUM CHLORIDE, PRESERVATIVE FREE 10 ML: 5 INJECTION INTRAVENOUS at 05:09

## 2023-01-01 RX ADMIN — IPRATROPIUM BROMIDE AND ALBUTEROL SULFATE 3 ML: .5; 3 SOLUTION RESPIRATORY (INHALATION) at 19:48

## 2023-01-01 RX ADMIN — AMIODARONE HYDROCHLORIDE 400 MG: 200 TABLET ORAL at 08:27

## 2023-01-01 RX ADMIN — AMIODARONE HYDROCHLORIDE 400 MG: 200 TABLET ORAL at 18:40

## 2023-01-01 RX ADMIN — SENNOSIDES AND DOCUSATE SODIUM 1 TABLET: 50; 8.6 TABLET ORAL at 10:21

## 2023-01-01 RX ADMIN — HEPARIN SODIUM 10 UNITS/KG/HR: 10000 INJECTION, SOLUTION INTRAVENOUS at 01:23

## 2023-01-01 RX ADMIN — Medication: at 08:28

## 2023-01-01 RX ADMIN — BUMETANIDE 1.5 MG: 0.25 INJECTION, SOLUTION INTRAMUSCULAR; INTRAVENOUS at 21:52

## 2023-01-01 RX ADMIN — ALBUMIN (HUMAN) 12.5 G: 0.25 INJECTION, SOLUTION INTRAVENOUS at 17:41

## 2023-01-01 RX ADMIN — ALBUMIN (HUMAN) 12.5 G: 0.25 INJECTION, SOLUTION INTRAVENOUS at 09:10

## 2023-01-01 RX ADMIN — EPOETIN ALFA-EPBX 10000 UNITS: 10000 INJECTION, SOLUTION INTRAVENOUS; SUBCUTANEOUS at 21:35

## 2023-01-01 RX ADMIN — Medication 1 CAPSULE: at 08:50

## 2023-01-01 RX ADMIN — METOPROLOL SUCCINATE 12.5 MG: 25 TABLET, EXTENDED RELEASE ORAL at 20:32

## 2023-01-01 RX ADMIN — SODIUM CHLORIDE, PRESERVATIVE FREE 10 ML: 5 INJECTION INTRAVENOUS at 15:12

## 2023-01-01 RX ADMIN — Medication: at 08:40

## 2023-01-01 RX ADMIN — BUMETANIDE 1 MG: 1 TABLET ORAL at 08:35

## 2023-01-01 RX ADMIN — ACETAMINOPHEN 650 MG: 325 TABLET ORAL at 02:14

## 2023-01-01 RX ADMIN — METOPROLOL SUCCINATE 12.5 MG: 25 TABLET, EXTENDED RELEASE ORAL at 21:20

## 2023-01-01 RX ADMIN — CALCIUM CHLORIDE, MAGNESIUM CHLORIDE, DEXTROSE MONOHYDRATE, LACTIC ACID, SODIUM CHLORIDE, SODIUM BICARBONATE AND POTASSIUM CHLORIDE: 3.68; 3.05; 22; 5.4; 6.46; 3.09; .314 INJECTION INTRAVENOUS at 03:23

## 2023-01-01 RX ADMIN — Medication 1 CAPSULE: at 14:12

## 2023-01-01 RX ADMIN — TRAZODONE HYDROCHLORIDE 50 MG: 50 TABLET ORAL at 21:46

## 2023-01-01 RX ADMIN — ALBUMIN (HUMAN) 12.5 G: 0.25 INJECTION, SOLUTION INTRAVENOUS at 23:54

## 2023-01-01 RX ADMIN — SODIUM CHLORIDE, PRESERVATIVE FREE 10 ML: 5 INJECTION INTRAVENOUS at 07:15

## 2023-01-01 RX ADMIN — ALBUMIN HUMAN 25 G: 50 SOLUTION INTRAVENOUS at 15:32

## 2023-01-01 RX ADMIN — SODIUM CHLORIDE, PRESERVATIVE FREE 10 ML: 5 INJECTION INTRAVENOUS at 18:14

## 2023-01-01 RX ADMIN — ALBUMIN (HUMAN) 12.5 G: 0.25 INJECTION, SOLUTION INTRAVENOUS at 23:57

## 2023-01-01 RX ADMIN — Medication: at 17:48

## 2023-01-01 RX ADMIN — EPOETIN ALFA-EPBX 10000 UNITS: 10000 INJECTION, SOLUTION INTRAVENOUS; SUBCUTANEOUS at 20:22

## 2023-01-01 RX ADMIN — ONDANSETRON 4 MG: 2 INJECTION INTRAMUSCULAR; INTRAVENOUS at 20:35

## 2023-01-01 RX ADMIN — Medication 6 UNITS: at 09:05

## 2023-01-01 RX ADMIN — DOXYCYCLINE 100 MG: 100 INJECTION, POWDER, LYOPHILIZED, FOR SOLUTION INTRAVENOUS at 01:39

## 2023-01-01 RX ADMIN — CALCIUM CHLORIDE, MAGNESIUM CHLORIDE, DEXTROSE MONOHYDRATE, LACTIC ACID, SODIUM CHLORIDE, SODIUM BICARBONATE AND POTASSIUM CHLORIDE 1700 ML/HR: 3.68; 3.05; 22; 5.4; 6.46; 3.09; .314 INJECTION INTRAVENOUS at 14:22

## 2023-01-01 RX ADMIN — PANTOPRAZOLE SODIUM 40 MG: 40 TABLET, DELAYED RELEASE ORAL at 06:37

## 2023-01-01 RX ADMIN — SUCRALFATE 1 G: 1 TABLET ORAL at 16:28

## 2023-01-01 RX ADMIN — Medication 8 UNITS: at 13:11

## 2023-01-01 RX ADMIN — CALCIUM CHLORIDE, MAGNESIUM CHLORIDE, DEXTROSE MONOHYDRATE, LACTIC ACID, SODIUM CHLORIDE, SODIUM BICARBONATE AND POTASSIUM CHLORIDE: 5.15; 2.03; 22; 5.4; 6.46; 3.09; .157 INJECTION INTRAVENOUS at 08:54

## 2023-01-01 RX ADMIN — Medication 2 UNITS: at 08:35

## 2023-01-01 RX ADMIN — APIXABAN 7.5 MG: 5 TABLET, FILM COATED ORAL at 19:01

## 2023-01-01 RX ADMIN — CALCIUM CHLORIDE, MAGNESIUM CHLORIDE, DEXTROSE MONOHYDRATE, LACTIC ACID, SODIUM CHLORIDE, SODIUM BICARBONATE AND POTASSIUM CHLORIDE: 3.68; 3.05; 22; 5.4; 6.46; 3.09; .314 INJECTION INTRAVENOUS at 17:56

## 2023-01-01 RX ADMIN — Medication: at 08:57

## 2023-01-01 RX ADMIN — ROSUVASTATIN 20 MG: 10 TABLET, FILM COATED ORAL at 21:26

## 2023-01-01 RX ADMIN — POLYETHYLENE GLYCOL 3350 17 G: 17 POWDER, FOR SOLUTION ORAL at 07:32

## 2023-01-01 RX ADMIN — CALCIUM CHLORIDE, MAGNESIUM CHLORIDE, DEXTROSE MONOHYDRATE, LACTIC ACID, SODIUM CHLORIDE, SODIUM BICARBONATE AND POTASSIUM CHLORIDE: 3.68; 3.05; 22; 5.4; 6.46; 3.09; .314 INJECTION INTRAVENOUS at 20:27

## 2023-01-01 RX ADMIN — SODIUM CHLORIDE, PRESERVATIVE FREE 40 MG: 5 INJECTION INTRAVENOUS at 08:31

## 2023-01-01 RX ADMIN — ALBUMIN (HUMAN) 12.5 G: 0.25 INJECTION, SOLUTION INTRAVENOUS at 21:08

## 2023-01-01 RX ADMIN — PROPOFOL 35 MCG/KG/MIN: 10 INJECTION, EMULSION INTRAVENOUS at 17:08

## 2023-01-01 RX ADMIN — SODIUM CHLORIDE 2.6 UNITS/HR: 9 INJECTION, SOLUTION INTRAVENOUS at 00:13

## 2023-01-01 RX ADMIN — METOPROLOL SUCCINATE 12.5 MG: 25 TABLET, EXTENDED RELEASE ORAL at 08:54

## 2023-01-01 RX ADMIN — FENTANYL CITRATE 200 MCG/HR: 0.05 INJECTION, SOLUTION INTRAMUSCULAR; INTRAVENOUS at 18:50

## 2023-01-01 RX ADMIN — Medication 5 UNITS: at 12:49

## 2023-01-01 RX ADMIN — ROSUVASTATIN 20 MG: 10 TABLET, FILM COATED ORAL at 21:21

## 2023-01-01 RX ADMIN — VANCOMYCIN HYDROCHLORIDE 750 MG: 750 INJECTION, POWDER, LYOPHILIZED, FOR SOLUTION INTRAVENOUS at 15:10

## 2023-01-01 RX ADMIN — SODIUM CHLORIDE 5 MG/HR: 9 INJECTION, SOLUTION INTRAVENOUS at 19:25

## 2023-01-01 RX ADMIN — AMIODARONE HYDROCHLORIDE 400 MG: 200 TABLET ORAL at 18:11

## 2023-01-01 RX ADMIN — ACETAMINOPHEN 650 MG: 325 TABLET ORAL at 16:03

## 2023-01-01 RX ADMIN — SODIUM CHLORIDE, PRESERVATIVE FREE 40 MG: 5 INJECTION INTRAVENOUS at 08:26

## 2023-01-01 RX ADMIN — CEFEPIME 2 G: 2 INJECTION, POWDER, FOR SOLUTION INTRAVENOUS at 19:56

## 2023-01-01 RX ADMIN — AMIODARONE HYDROCHLORIDE 400 MG: 200 TABLET ORAL at 17:45

## 2023-01-01 RX ADMIN — Medication 2 UNITS: at 22:32

## 2023-01-01 RX ADMIN — Medication 4 UNITS: at 22:11

## 2023-01-01 RX ADMIN — OXYCODONE HYDROCHLORIDE 10 MG: 5 TABLET ORAL at 22:12

## 2023-01-01 RX ADMIN — MUPIROCIN: 20 OINTMENT TOPICAL at 08:41

## 2023-01-01 RX ADMIN — TRAZODONE HYDROCHLORIDE 50 MG: 50 TABLET ORAL at 21:08

## 2023-01-01 RX ADMIN — SODIUM CHLORIDE, PRESERVATIVE FREE 10 ML: 5 INJECTION INTRAVENOUS at 18:58

## 2023-01-01 RX ADMIN — LEVETIRACETAM 250 MG: 100 INJECTION INTRAVENOUS at 21:14

## 2023-01-01 RX ADMIN — WHITE PETROLATUM 57.7 %-MINERAL OIL 31.9 % EYE OINTMENT: at 21:40

## 2023-01-01 RX ADMIN — METOPROLOL SUCCINATE 12.5 MG: 25 TABLET, EXTENDED RELEASE ORAL at 12:44

## 2023-01-01 RX ADMIN — Medication 4 UNITS: at 17:28

## 2023-01-01 RX ADMIN — SODIUM CHLORIDE, PRESERVATIVE FREE 10 ML: 5 INJECTION INTRAVENOUS at 14:42

## 2023-01-01 RX ADMIN — Medication 2 UNITS: at 12:41

## 2023-01-01 RX ADMIN — DEXMEDETOMIDINE HYDROCHLORIDE 0.8 MCG/KG/HR: 4 INJECTION, SOLUTION INTRAVENOUS at 21:46

## 2023-01-01 RX ADMIN — Medication 3 UNITS: at 12:01

## 2023-01-01 RX ADMIN — CALCIUM CHLORIDE, MAGNESIUM CHLORIDE, DEXTROSE MONOHYDRATE, LACTIC ACID, SODIUM CHLORIDE, SODIUM BICARBONATE AND POTASSIUM CHLORIDE: 3.68; 3.05; 22; 5.4; 6.46; 3.09; .314 INJECTION INTRAVENOUS at 20:32

## 2023-01-01 RX ADMIN — MUPIROCIN: 20 OINTMENT TOPICAL at 18:19

## 2023-01-01 RX ADMIN — CALCIUM CHLORIDE, MAGNESIUM CHLORIDE, DEXTROSE MONOHYDRATE, LACTIC ACID, SODIUM CHLORIDE, SODIUM BICARBONATE AND POTASSIUM CHLORIDE 1600 ML/HR: 3.68; 3.05; 22; 5.4; 6.46; 3.09; .314 INJECTION INTRAVENOUS at 15:19

## 2023-01-01 RX ADMIN — CALCIUM CHLORIDE, MAGNESIUM CHLORIDE, DEXTROSE MONOHYDRATE, LACTIC ACID, SODIUM CHLORIDE, SODIUM BICARBONATE AND POTASSIUM CHLORIDE: 3.68; 3.05; 22; 5.4; 6.46; 3.09; .314 INJECTION INTRAVENOUS at 08:30

## 2023-01-01 RX ADMIN — CALCIUM CHLORIDE, MAGNESIUM CHLORIDE, DEXTROSE MONOHYDRATE, LACTIC ACID, SODIUM CHLORIDE, SODIUM BICARBONATE AND POTASSIUM CHLORIDE: 5.15; 2.03; 22; 5.4; 6.46; 3.09; .157 INJECTION INTRAVENOUS at 04:25

## 2023-01-01 RX ADMIN — ALBUMIN (HUMAN) 12.5 G: 12.5 INJECTION, SOLUTION INTRAVENOUS at 16:25

## 2023-01-01 RX ADMIN — SODIUM CHLORIDE 3 ML/HR: 9 INJECTION, SOLUTION INTRAVENOUS at 06:19

## 2023-01-01 RX ADMIN — HEPARIN SODIUM 1500 UNITS: 1000 INJECTION INTRAVENOUS; SUBCUTANEOUS at 13:41

## 2023-01-01 RX ADMIN — HYDROMORPHONE HYDROCHLORIDE 0.5 MG: 1 INJECTION, SOLUTION INTRAMUSCULAR; INTRAVENOUS; SUBCUTANEOUS at 03:16

## 2023-01-01 RX ADMIN — POLYETHYLENE GLYCOL 3350 17 G: 17 POWDER, FOR SOLUTION ORAL at 09:38

## 2023-01-01 RX ADMIN — METOPROLOL SUCCINATE 12.5 MG: 25 TABLET, EXTENDED RELEASE ORAL at 22:05

## 2023-01-01 RX ADMIN — NOREPINEPHRINE BITARTRATE 2 MCG/MIN: 1 INJECTION, SOLUTION, CONCENTRATE INTRAVENOUS at 06:26

## 2023-01-01 RX ADMIN — SODIUM CHLORIDE, PRESERVATIVE FREE 10 ML: 5 INJECTION INTRAVENOUS at 21:05

## 2023-01-01 RX ADMIN — Medication 5 UNITS: at 08:28

## 2023-01-01 RX ADMIN — FENTANYL CITRATE 100 MCG/HR: 0.05 INJECTION, SOLUTION INTRAMUSCULAR; INTRAVENOUS at 16:31

## 2023-01-01 RX ADMIN — Medication 6 UNITS: at 08:30

## 2023-01-01 RX ADMIN — HEPARIN SODIUM 3264 UNITS: 1000 INJECTION INTRAVENOUS; SUBCUTANEOUS at 23:45

## 2023-01-01 RX ADMIN — FENTANYL CITRATE 125 MCG/HR: 0.05 INJECTION, SOLUTION INTRAMUSCULAR; INTRAVENOUS at 17:54

## 2023-01-01 RX ADMIN — PIPERACILLIN AND TAZOBACTAM 3.38 G: 3; .375 INJECTION, POWDER, FOR SOLUTION INTRAVENOUS at 21:09

## 2023-01-01 RX ADMIN — ALTEPLASE 1 MG: 2.2 INJECTION, POWDER, LYOPHILIZED, FOR SOLUTION INTRAVENOUS at 17:17

## 2023-01-01 RX ADMIN — ALBUMIN (HUMAN) 12.5 G: 12.5 INJECTION, SOLUTION INTRAVENOUS at 15:49

## 2023-01-01 RX ADMIN — Medication: at 19:30

## 2023-01-01 RX ADMIN — HEPARIN SODIUM 2000 UNITS: 1000 INJECTION INTRAVENOUS; SUBCUTANEOUS at 19:10

## 2023-01-01 RX ADMIN — ALBUMIN (HUMAN) 12.5 G: 0.25 INJECTION, SOLUTION INTRAVENOUS at 08:41

## 2023-01-01 RX ADMIN — APIXABAN 10 MG: 5 TABLET, FILM COATED ORAL at 08:31

## 2023-01-01 RX ADMIN — Medication: at 08:34

## 2023-01-01 RX ADMIN — SODIUM CHLORIDE, PRESERVATIVE FREE 10 ML: 5 INJECTION INTRAVENOUS at 06:00

## 2023-01-01 RX ADMIN — CALCIUM CHLORIDE, MAGNESIUM CHLORIDE, DEXTROSE MONOHYDRATE, LACTIC ACID, SODIUM CHLORIDE, SODIUM BICARBONATE AND POTASSIUM CHLORIDE: 3.68; 3.05; 22; 5.4; 6.46; 3.09; .314 INJECTION INTRAVENOUS at 14:44

## 2023-01-01 RX ADMIN — SODIUM CHLORIDE, PRESERVATIVE FREE 10 ML: 5 INJECTION INTRAVENOUS at 05:38

## 2023-01-01 RX ADMIN — BUMETANIDE 1 MG: 0.25 INJECTION, SOLUTION INTRAMUSCULAR; INTRAVENOUS at 16:01

## 2023-01-01 RX ADMIN — TRAZODONE HYDROCHLORIDE 50 MG: 50 TABLET ORAL at 22:36

## 2023-01-01 RX ADMIN — Medication 6 UNITS: at 12:57

## 2023-01-01 RX ADMIN — ALTEPLASE 3 MG: 2.2 INJECTION, POWDER, LYOPHILIZED, FOR SOLUTION INTRAVENOUS at 16:19

## 2023-01-01 RX ADMIN — ALBUMIN (HUMAN) 25 G: 12.5 INJECTION, SOLUTION INTRAVENOUS at 05:16

## 2023-01-01 RX ADMIN — ASPIRIN 81 MG CHEWABLE TABLET 81 MG: 81 TABLET CHEWABLE at 08:30

## 2023-01-01 RX ADMIN — ALBUMIN (HUMAN) 12.5 G: 0.25 INJECTION, SOLUTION INTRAVENOUS at 23:33

## 2023-01-01 RX ADMIN — FLUCONAZOLE IN SODIUM CHLORIDE 400 MG: 2 INJECTION, SOLUTION INTRAVENOUS at 10:20

## 2023-01-01 RX ADMIN — Medication: at 08:51

## 2023-01-01 RX ADMIN — PROPOFOL 40 MCG/KG/MIN: 10 INJECTION, EMULSION INTRAVENOUS at 14:50

## 2023-01-01 RX ADMIN — MAGNESIUM OXIDE 400 MG (241.3 MG MAGNESIUM) TABLET 400 MG: TABLET at 08:27

## 2023-01-01 RX ADMIN — POLYETHYLENE GLYCOL 3350 17 G: 17 POWDER, FOR SOLUTION ORAL at 08:39

## 2023-01-01 RX ADMIN — PIPERACILLIN AND TAZOBACTAM 3.38 G: 3; .375 INJECTION, POWDER, FOR SOLUTION INTRAVENOUS at 14:07

## 2023-01-01 RX ADMIN — APIXABAN 5 MG: 5 TABLET, FILM COATED ORAL at 08:31

## 2023-01-01 RX ADMIN — Medication: at 09:16

## 2023-01-01 RX ADMIN — PIPERACILLIN AND TAZOBACTAM 3.38 G: 3; .375 INJECTION, POWDER, FOR SOLUTION INTRAVENOUS at 20:51

## 2023-01-01 RX ADMIN — CALCIUM CHLORIDE, MAGNESIUM CHLORIDE, DEXTROSE MONOHYDRATE, LACTIC ACID, SODIUM CHLORIDE, SODIUM BICARBONATE AND POTASSIUM CHLORIDE: 3.68; 3.05; 22; 5.4; 6.46; 3.09; .314 INJECTION INTRAVENOUS at 17:33

## 2023-01-01 RX ADMIN — ASPIRIN 81 MG: 81 TABLET, COATED ORAL at 09:05

## 2023-01-01 RX ADMIN — ACETAMINOPHEN 650 MG: 325 TABLET ORAL at 01:44

## 2023-01-01 RX ADMIN — Medication 6 UNITS: at 09:44

## 2023-01-01 RX ADMIN — HEPARIN SODIUM 1700 UNITS: 1000 INJECTION INTRAVENOUS; SUBCUTANEOUS at 20:53

## 2023-01-01 RX ADMIN — CALCIUM CHLORIDE, MAGNESIUM CHLORIDE, DEXTROSE MONOHYDRATE, LACTIC ACID, SODIUM CHLORIDE, SODIUM BICARBONATE AND POTASSIUM CHLORIDE: 5.15; 2.03; 22; 5.4; 6.46; 3.09; .157 INJECTION INTRAVENOUS at 00:37

## 2023-01-01 RX ADMIN — SODIUM CHLORIDE 5 MG/HR: 9 INJECTION, SOLUTION INTRAVENOUS at 12:26

## 2023-01-01 RX ADMIN — SODIUM CHLORIDE 4.2 UNITS/HR: 9 INJECTION, SOLUTION INTRAVENOUS at 17:15

## 2023-01-01 RX ADMIN — SODIUM CHLORIDE, PRESERVATIVE FREE 10 ML: 5 INJECTION INTRAVENOUS at 21:47

## 2023-01-01 RX ADMIN — BUMETANIDE 0.5 MG: 0.25 INJECTION, SOLUTION INTRAMUSCULAR; INTRAVENOUS at 04:24

## 2023-01-01 RX ADMIN — HEPARIN SODIUM 1500 UNITS: 1000 INJECTION INTRAVENOUS; SUBCUTANEOUS at 08:19

## 2023-01-01 RX ADMIN — Medication: at 08:26

## 2023-01-01 RX ADMIN — SODIUM CHLORIDE, PRESERVATIVE FREE 10 ML: 5 INJECTION INTRAVENOUS at 15:34

## 2023-01-01 RX ADMIN — CALCIUM CHLORIDE, MAGNESIUM CHLORIDE, DEXTROSE MONOHYDRATE, LACTIC ACID, SODIUM CHLORIDE, SODIUM BICARBONATE AND POTASSIUM CHLORIDE: 3.68; 3.05; 22; 5.4; 6.46; 3.09; .314 INJECTION INTRAVENOUS at 22:45

## 2023-01-01 RX ADMIN — COLCHICINE 0.6 MG: 0.6 TABLET, FILM COATED ORAL at 08:58

## 2023-01-01 RX ADMIN — COLCHICINE 0.6 MG: 0.6 TABLET, FILM COATED ORAL at 08:40

## 2023-01-01 RX ADMIN — Medication 8 UNITS: at 12:08

## 2023-01-01 RX ADMIN — CALCIUM CHLORIDE, MAGNESIUM CHLORIDE, DEXTROSE MONOHYDRATE, LACTIC ACID, SODIUM CHLORIDE, SODIUM BICARBONATE AND POTASSIUM CHLORIDE 1700 ML/HR: 3.68; 3.05; 22; 5.4; 6.46; 3.09; .314 INJECTION INTRAVENOUS at 12:09

## 2023-01-01 RX ADMIN — ALBUMIN (HUMAN) 12.5 G: 0.25 INJECTION, SOLUTION INTRAVENOUS at 17:16

## 2023-01-01 RX ADMIN — FENTANYL CITRATE 200 MCG/HR: 0.05 INJECTION, SOLUTION INTRAMUSCULAR; INTRAVENOUS at 11:25

## 2023-01-01 RX ADMIN — DEXMEDETOMIDINE HYDROCHLORIDE 1.5 MCG/KG/HR: 4 INJECTION, SOLUTION INTRAVENOUS at 05:30

## 2023-01-01 RX ADMIN — ALBUMIN (HUMAN) 12.5 G: 0.25 INJECTION, SOLUTION INTRAVENOUS at 21:19

## 2023-01-01 RX ADMIN — COLCHICINE 0.6 MG: 0.6 TABLET, FILM COATED ORAL at 10:28

## 2023-01-01 RX ADMIN — METRONIDAZOLE 500 MG: 500 INJECTION, SOLUTION INTRAVENOUS at 08:50

## 2023-01-01 RX ADMIN — Medication 5 UNITS: at 16:49

## 2023-01-01 RX ADMIN — METOPROLOL SUCCINATE 12.5 MG: 25 TABLET, EXTENDED RELEASE ORAL at 08:46

## 2023-01-01 RX ADMIN — FLUCONAZOLE IN SODIUM CHLORIDE 200 MG: 2 INJECTION, SOLUTION INTRAVENOUS at 04:14

## 2023-01-01 RX ADMIN — Medication 2 UNITS: at 08:40

## 2023-01-01 RX ADMIN — MAGNESIUM OXIDE 400 MG (241.3 MG MAGNESIUM) TABLET 400 MG: TABLET at 08:57

## 2023-01-01 RX ADMIN — HEPARIN SODIUM 1500 UNITS: 1000 INJECTION INTRAVENOUS; SUBCUTANEOUS at 14:34

## 2023-01-01 RX ADMIN — ONDANSETRON 4 MG: 2 INJECTION INTRAMUSCULAR; INTRAVENOUS at 13:13

## 2023-01-01 RX ADMIN — HYDRALAZINE HYDROCHLORIDE 5 MG: 20 INJECTION INTRAMUSCULAR; INTRAVENOUS at 08:13

## 2023-01-01 RX ADMIN — CALCIUM CHLORIDE, MAGNESIUM CHLORIDE, DEXTROSE MONOHYDRATE, LACTIC ACID, SODIUM CHLORIDE, SODIUM BICARBONATE AND POTASSIUM CHLORIDE: 3.68; 3.05; 22; 5.4; 6.46; 3.09; .314 INJECTION INTRAVENOUS at 19:50

## 2023-01-01 RX ADMIN — NEOMYCIN SULFATE, POLYMYXIN B SULFATE, AND DEXAMETHASONE: 3.5; 10000; 1 OINTMENT OPHTHALMIC at 08:26

## 2023-01-01 RX ADMIN — HEPARIN SODIUM 1600 UNITS: 1000 INJECTION INTRAVENOUS; SUBCUTANEOUS at 19:15

## 2023-01-01 RX ADMIN — ALBUMIN (HUMAN) 12.5 G: 0.25 INJECTION, SOLUTION INTRAVENOUS at 12:53

## 2023-01-01 RX ADMIN — Medication: at 12:25

## 2023-01-01 RX ADMIN — TRAZODONE HYDROCHLORIDE 50 MG: 50 TABLET ORAL at 21:47

## 2023-01-01 RX ADMIN — NOREPINEPHRINE BITARTRATE 4 MCG/MIN: 1 INJECTION, SOLUTION, CONCENTRATE INTRAVENOUS at 00:41

## 2023-01-01 RX ADMIN — POTASSIUM CHLORIDE 20 MEQ: 29.8 INJECTION, SOLUTION INTRAVENOUS at 06:21

## 2023-01-01 RX ADMIN — ALBUMIN (HUMAN) 12.5 G: 0.25 INJECTION, SOLUTION INTRAVENOUS at 17:37

## 2023-01-01 RX ADMIN — ONDANSETRON 4 MG: 2 INJECTION INTRAMUSCULAR; INTRAVENOUS at 19:26

## 2023-01-01 RX ADMIN — Medication 1 UNITS: at 00:23

## 2023-01-01 RX ADMIN — NOREPINEPHRINE BITARTRATE 2 MCG/MIN: 1 INJECTION, SOLUTION, CONCENTRATE INTRAVENOUS at 00:50

## 2023-01-01 RX ADMIN — OXYCODONE HYDROCHLORIDE 5 MG: 5 TABLET ORAL at 14:11

## 2023-01-01 RX ADMIN — FLUCONAZOLE IN SODIUM CHLORIDE 400 MG: 2 INJECTION, SOLUTION INTRAVENOUS at 12:04

## 2023-01-01 RX ADMIN — SODIUM CHLORIDE 3 UNITS/HR: 9 INJECTION, SOLUTION INTRAVENOUS at 18:26

## 2023-01-01 RX ADMIN — CHLORHEXIDINE GLUCONATE 10 ML: 1.2 RINSE ORAL at 17:59

## 2023-01-01 RX ADMIN — Medication 2 UNITS: at 06:32

## 2023-01-01 RX ADMIN — SODIUM CHLORIDE, PRESERVATIVE FREE 10 ML: 5 INJECTION INTRAVENOUS at 22:19

## 2023-01-01 RX ADMIN — LEVETIRACETAM 500 MG: 100 INJECTION INTRAVENOUS at 21:09

## 2023-01-01 RX ADMIN — Medication: at 08:58

## 2023-01-01 RX ADMIN — POTASSIUM CHLORIDE 20 MEQ: 750 TABLET, FILM COATED, EXTENDED RELEASE ORAL at 11:53

## 2023-01-01 RX ADMIN — SODIUM CHLORIDE, PRESERVATIVE FREE 10 ML: 5 INJECTION INTRAVENOUS at 20:18

## 2023-01-01 RX ADMIN — LEVETIRACETAM 500 MG: 100 INJECTION INTRAVENOUS at 08:50

## 2023-01-01 RX ADMIN — DEXMEDETOMIDINE HYDROCHLORIDE 1.5 MCG/KG/HR: 4 INJECTION, SOLUTION INTRAVENOUS at 22:12

## 2023-01-01 RX ADMIN — GABAPENTIN 200 MG: 100 CAPSULE ORAL at 17:54

## 2023-01-01 RX ADMIN — LEVETIRACETAM 500 MG: 100 INJECTION, SOLUTION, CONCENTRATE INTRAVENOUS at 13:47

## 2023-01-01 RX ADMIN — Medication 12 UNITS: at 21:23

## 2023-01-01 RX ADMIN — LEVETIRACETAM 500 MG: 100 INJECTION, SOLUTION, CONCENTRATE INTRAVENOUS at 13:08

## 2023-01-01 RX ADMIN — Medication 100 MEQ: at 15:17

## 2023-01-01 RX ADMIN — CALCIUM CHLORIDE, MAGNESIUM CHLORIDE, DEXTROSE MONOHYDRATE, LACTIC ACID, SODIUM CHLORIDE, SODIUM BICARBONATE AND POTASSIUM CHLORIDE: 3.68; 3.05; 22; 5.4; 6.46; 3.09; .314 INJECTION INTRAVENOUS at 17:32

## 2023-01-01 RX ADMIN — LEVETIRACETAM 500 MG: 100 INJECTION INTRAVENOUS at 20:56

## 2023-01-01 RX ADMIN — CINACALCET HYDROCHLORIDE 60 MG: 30 TABLET, FILM COATED ORAL at 08:29

## 2023-01-01 RX ADMIN — DEXTROSE MONOHYDRATE 150 MG: 50 INJECTION, SOLUTION INTRAVENOUS at 21:21

## 2023-01-01 RX ADMIN — METRONIDAZOLE 500 MG: 500 INJECTION, SOLUTION INTRAVENOUS at 09:30

## 2023-01-01 RX ADMIN — POTASSIUM CHLORIDE 20 MEQ: 29.8 INJECTION, SOLUTION INTRAVENOUS at 09:13

## 2023-01-01 RX ADMIN — CALCITONIN SALMON 256 INT'L UNITS: 200 INJECTION, SOLUTION INTRAMUSCULAR; SUBCUTANEOUS at 01:10

## 2023-01-01 RX ADMIN — CALCIUM CHLORIDE, MAGNESIUM CHLORIDE, DEXTROSE MONOHYDRATE, LACTIC ACID, SODIUM CHLORIDE, SODIUM BICARBONATE AND POTASSIUM CHLORIDE: 3.68; 3.05; 22; 5.4; 6.46; 3.09; .314 INJECTION INTRAVENOUS at 15:02

## 2023-01-01 RX ADMIN — Medication: at 08:56

## 2023-01-01 RX ADMIN — DEXMEDETOMIDINE HYDROCHLORIDE 0.4 MCG/KG/HR: 4 INJECTION, SOLUTION INTRAVENOUS at 10:31

## 2023-01-01 RX ADMIN — DEXTROSE MONOHYDRATE 75 ML: 100 INJECTION, SOLUTION INTRAVENOUS at 02:20

## 2023-01-01 RX ADMIN — SODIUM CHLORIDE, PRESERVATIVE FREE 10 ML: 5 INJECTION INTRAVENOUS at 05:49

## 2023-01-01 RX ADMIN — CINACALCET HYDROCHLORIDE 60 MG: 30 TABLET, FILM COATED ORAL at 09:23

## 2023-01-01 RX ADMIN — ALTEPLASE 2 MG: 2.2 INJECTION, POWDER, LYOPHILIZED, FOR SOLUTION INTRAVENOUS at 20:47

## 2023-01-01 RX ADMIN — ASPIRIN 81 MG: 81 TABLET, COATED ORAL at 10:44

## 2023-01-01 RX ADMIN — Medication 8 UNITS: at 18:57

## 2023-01-01 RX ADMIN — SODIUM CHLORIDE, PRESERVATIVE FREE 10 ML: 5 INJECTION INTRAVENOUS at 07:30

## 2023-01-01 RX ADMIN — Medication: at 17:10

## 2023-01-01 RX ADMIN — MILRINONE LACTATE IN DEXTROSE 0.38 MCG/KG/MIN: 200 INJECTION, SOLUTION INTRAVENOUS at 13:39

## 2023-01-01 RX ADMIN — METOPROLOL SUCCINATE 12.5 MG: 25 TABLET, EXTENDED RELEASE ORAL at 10:36

## 2023-01-01 RX ADMIN — CALCIUM CHLORIDE, MAGNESIUM CHLORIDE, DEXTROSE MONOHYDRATE, LACTIC ACID, SODIUM CHLORIDE, SODIUM BICARBONATE AND POTASSIUM CHLORIDE: 5.15; 2.03; 22; 5.4; 6.46; 3.09; .157 INJECTION INTRAVENOUS at 08:33

## 2023-01-01 RX ADMIN — SODIUM CHLORIDE 5 MG/HR: 9 INJECTION, SOLUTION INTRAVENOUS at 12:50

## 2023-01-01 RX ADMIN — NEOMYCIN SULFATE, POLYMYXIN B SULFATE, AND DEXAMETHASONE: 3.5; 10000; 1 OINTMENT OPHTHALMIC at 10:32

## 2023-01-01 RX ADMIN — LEVETIRACETAM 500 MG: 100 INJECTION, SOLUTION, CONCENTRATE INTRAVENOUS at 13:04

## 2023-01-01 RX ADMIN — ETOMIDATE 10 MG: 2 INJECTION, SOLUTION INTRAVENOUS at 13:27

## 2023-01-01 RX ADMIN — FUROSEMIDE 40 MG: 10 INJECTION, SOLUTION INTRAMUSCULAR; INTRAVENOUS at 09:42

## 2023-01-01 RX ADMIN — FLUCONAZOLE IN SODIUM CHLORIDE 400 MG: 2 INJECTION, SOLUTION INTRAVENOUS at 12:07

## 2023-01-01 RX ADMIN — CALCIUM CHLORIDE, MAGNESIUM CHLORIDE, DEXTROSE MONOHYDRATE, LACTIC ACID, SODIUM CHLORIDE, SODIUM BICARBONATE AND POTASSIUM CHLORIDE 1600 ML/HR: 3.68; 3.05; 22; 5.4; 6.46; 3.09; .314 INJECTION INTRAVENOUS at 10:07

## 2023-01-01 RX ADMIN — SODIUM CHLORIDE 6 ML/HR: 9 INJECTION, SOLUTION INTRAVENOUS at 08:00

## 2023-01-01 RX ADMIN — Medication 200 MCG: at 13:30

## 2023-01-01 RX ADMIN — ALBUMIN (HUMAN) 12.5 G: 0.25 INJECTION, SOLUTION INTRAVENOUS at 01:12

## 2023-01-01 RX ADMIN — ALTEPLASE 3 MG: 2.2 INJECTION, POWDER, LYOPHILIZED, FOR SOLUTION INTRAVENOUS at 08:51

## 2023-01-01 RX ADMIN — SODIUM CHLORIDE 11 ML/HR: 9 INJECTION, SOLUTION INTRAVENOUS at 07:48

## 2023-01-01 RX ADMIN — Medication 2 UNITS: at 08:33

## 2023-01-01 RX ADMIN — PERFLUTREN 1 ML: 6.52 INJECTION, SUSPENSION INTRAVENOUS at 14:53

## 2023-01-01 RX ADMIN — SODIUM CHLORIDE, PRESERVATIVE FREE 10 ML: 5 INJECTION INTRAVENOUS at 06:14

## 2023-01-01 RX ADMIN — CALCIUM CHLORIDE, MAGNESIUM CHLORIDE, DEXTROSE MONOHYDRATE, LACTIC ACID, SODIUM CHLORIDE, SODIUM BICARBONATE AND POTASSIUM CHLORIDE: 3.68; 3.05; 22; 5.4; 6.46; 3.09; .314 INJECTION INTRAVENOUS at 01:15

## 2023-01-01 RX ADMIN — PIPERACILLIN AND TAZOBACTAM 3.38 G: 3; .375 INJECTION, POWDER, FOR SOLUTION INTRAVENOUS at 13:06

## 2023-01-01 RX ADMIN — APIXABAN 2.5 MG: 2.5 TABLET, FILM COATED ORAL at 17:45

## 2023-01-01 RX ADMIN — OXYCODONE HYDROCHLORIDE 5 MG: 5 TABLET ORAL at 08:17

## 2023-01-01 RX ADMIN — LEVETIRACETAM 500 MG: 100 INJECTION INTRAVENOUS at 08:14

## 2023-01-01 RX ADMIN — SODIUM CHLORIDE, PRESERVATIVE FREE 10 ML: 5 INJECTION INTRAVENOUS at 07:14

## 2023-01-01 RX ADMIN — NOREPINEPHRINE BITARTRATE 2 MCG/MIN: 1 INJECTION, SOLUTION, CONCENTRATE INTRAVENOUS at 10:29

## 2023-01-01 RX ADMIN — EPINEPHRINE 8 MCG/MIN: 1 INJECTION INTRAMUSCULAR; INTRAVENOUS; SUBCUTANEOUS at 04:17

## 2023-01-01 RX ADMIN — SODIUM CHLORIDE, PRESERVATIVE FREE 10 ML: 5 INJECTION INTRAVENOUS at 22:13

## 2023-01-01 RX ADMIN — SODIUM CHLORIDE, PRESERVATIVE FREE 10 ML: 5 INJECTION INTRAVENOUS at 08:38

## 2023-01-01 RX ADMIN — CALCIUM CHLORIDE, MAGNESIUM CHLORIDE, DEXTROSE MONOHYDRATE, LACTIC ACID, SODIUM CHLORIDE, SODIUM BICARBONATE AND POTASSIUM CHLORIDE: 3.68; 3.05; 22; 5.4; 6.46; 3.09; .314 INJECTION INTRAVENOUS at 14:14

## 2023-01-01 RX ADMIN — CALCIUM CHLORIDE, MAGNESIUM CHLORIDE, DEXTROSE MONOHYDRATE, LACTIC ACID, SODIUM CHLORIDE, SODIUM BICARBONATE AND POTASSIUM CHLORIDE: 3.68; 3.05; 22; 5.4; 6.46; 3.09; .314 INJECTION INTRAVENOUS at 07:55

## 2023-01-01 RX ADMIN — CINACALCET HYDROCHLORIDE 60 MG: 30 TABLET, FILM COATED ORAL at 08:55

## 2023-01-01 RX ADMIN — Medication 2 UNITS: at 00:19

## 2023-01-01 RX ADMIN — DEXMEDETOMIDINE HYDROCHLORIDE 1.3 MCG/KG/HR: 4 INJECTION, SOLUTION INTRAVENOUS at 21:17

## 2023-01-01 RX ADMIN — CALCIUM CHLORIDE, MAGNESIUM CHLORIDE, DEXTROSE MONOHYDRATE, LACTIC ACID, SODIUM CHLORIDE, SODIUM BICARBONATE AND POTASSIUM CHLORIDE: 3.68; 3.05; 22; 5.4; 6.46; 3.09; .314 INJECTION INTRAVENOUS at 07:08

## 2023-01-01 RX ADMIN — KETOROLAC TROMETHAMINE 15 MG: 30 INJECTION, SOLUTION INTRAMUSCULAR at 10:19

## 2023-01-01 RX ADMIN — SODIUM CHLORIDE, PRESERVATIVE FREE 10 ML: 5 INJECTION INTRAVENOUS at 07:32

## 2023-01-01 RX ADMIN — SENNOSIDES AND DOCUSATE SODIUM 1 TABLET: 50; 8.6 TABLET ORAL at 09:38

## 2023-01-01 RX ADMIN — AMIODARONE HYDROCHLORIDE 400 MG: 200 TABLET ORAL at 08:39

## 2023-01-01 RX ADMIN — BUMETANIDE 2 MG: 0.25 INJECTION, SOLUTION INTRAMUSCULAR; INTRAVENOUS at 21:14

## 2023-01-01 RX ADMIN — AMIODARONE HYDROCHLORIDE 400 MG: 200 TABLET ORAL at 08:52

## 2023-01-01 RX ADMIN — SODIUM CHLORIDE 250 ML: 9 INJECTION, SOLUTION INTRAVENOUS at 09:26

## 2023-01-01 RX ADMIN — VANCOMYCIN HYDROCHLORIDE 1750 MG: 10 INJECTION, POWDER, LYOPHILIZED, FOR SOLUTION INTRAVENOUS at 09:20

## 2023-01-01 RX ADMIN — PROPOFOL 35 MCG/KG/MIN: 10 INJECTION, EMULSION INTRAVENOUS at 01:39

## 2023-01-01 RX ADMIN — ALBUMIN (HUMAN) 12.5 G: 0.25 INJECTION, SOLUTION INTRAVENOUS at 05:51

## 2023-01-01 RX ADMIN — CHLORHEXIDINE GLUCONATE 10 ML: 1.2 RINSE ORAL at 17:21

## 2023-01-01 RX ADMIN — CALCIUM CHLORIDE, MAGNESIUM CHLORIDE, DEXTROSE MONOHYDRATE, LACTIC ACID, SODIUM CHLORIDE, SODIUM BICARBONATE AND POTASSIUM CHLORIDE: 3.68; 3.05; 22; 5.4; 6.46; 3.09; .314 INJECTION INTRAVENOUS at 10:59

## 2023-01-01 RX ADMIN — ASPIRIN 81 MG CHEWABLE TABLET 81 MG: 81 TABLET CHEWABLE at 09:10

## 2023-01-01 RX ADMIN — OXYCODONE HYDROCHLORIDE 5 MG: 5 TABLET ORAL at 13:21

## 2023-01-01 RX ADMIN — ASPIRIN 81 MG CHEWABLE TABLET 81 MG: 81 TABLET CHEWABLE at 08:25

## 2023-01-01 RX ADMIN — IRON SUCROSE 200 MG: 20 INJECTION, SOLUTION INTRAVENOUS at 08:41

## 2023-01-01 RX ADMIN — HEPARIN SODIUM 4 UNITS/KG/HR: 10000 INJECTION, SOLUTION INTRAVENOUS at 11:40

## 2023-01-01 RX ADMIN — ALBUMIN (HUMAN) 12.5 G: 0.25 INJECTION, SOLUTION INTRAVENOUS at 12:51

## 2023-01-01 RX ADMIN — CHLORHEXIDINE GLUCONATE 15 ML: 1.2 RINSE ORAL at 08:55

## 2023-01-01 RX ADMIN — SODIUM CHLORIDE 3 MG/HR: 9 INJECTION, SOLUTION INTRAVENOUS at 21:43

## 2023-01-01 RX ADMIN — CALCIUM CHLORIDE, MAGNESIUM CHLORIDE, DEXTROSE MONOHYDRATE, LACTIC ACID, SODIUM CHLORIDE, SODIUM BICARBONATE AND POTASSIUM CHLORIDE: 3.68; 3.05; 22; 5.4; 6.46; 3.09; .314 INJECTION INTRAVENOUS at 00:20

## 2023-01-01 RX ADMIN — ALBUMIN (HUMAN) 25 G: 0.25 INJECTION, SOLUTION INTRAVENOUS at 09:21

## 2023-01-01 RX ADMIN — Medication 16 UNITS: at 20:52

## 2023-01-01 RX ADMIN — DEXMEDETOMIDINE HYDROCHLORIDE 1 MCG/KG/HR: 4 INJECTION, SOLUTION INTRAVENOUS at 08:10

## 2023-01-01 RX ADMIN — SODIUM CHLORIDE, PRESERVATIVE FREE 40 MG: 5 INJECTION INTRAVENOUS at 09:31

## 2023-01-01 RX ADMIN — IRON SUCROSE 200 MG: 20 INJECTION, SOLUTION INTRAVENOUS at 11:56

## 2023-01-01 RX ADMIN — ASPIRIN 81 MG CHEWABLE TABLET 81 MG: 81 TABLET CHEWABLE at 08:58

## 2023-01-01 RX ADMIN — ALBUMIN (HUMAN) 12.5 G: 0.25 INJECTION, SOLUTION INTRAVENOUS at 10:41

## 2023-01-01 RX ADMIN — APIXABAN 5 MG: 5 TABLET, FILM COATED ORAL at 18:40

## 2023-01-01 RX ADMIN — FLUCONAZOLE IN SODIUM CHLORIDE 400 MG: 2 INJECTION, SOLUTION INTRAVENOUS at 11:17

## 2023-01-01 RX ADMIN — NEOMYCIN SULFATE, POLYMYXIN B SULFATE, AND DEXAMETHASONE: 3.5; 10000; 1 OINTMENT OPHTHALMIC at 09:27

## 2023-01-01 RX ADMIN — FENTANYL CITRATE 200 MCG: 50 INJECTION, SOLUTION INTRAMUSCULAR; INTRAVENOUS at 13:16

## 2023-01-01 RX ADMIN — ACETAMINOPHEN 650 MG: 325 TABLET ORAL at 20:23

## 2023-01-01 RX ADMIN — ACETAMINOPHEN 650 MG: 325 TABLET ORAL at 00:03

## 2023-01-01 RX ADMIN — Medication 6 UNITS: at 10:01

## 2023-01-01 RX ADMIN — Medication: at 19:31

## 2023-01-01 RX ADMIN — Medication 2 UNITS: at 23:45

## 2023-01-01 RX ADMIN — DEXMEDETOMIDINE HYDROCHLORIDE 1.5 MCG/KG/HR: 4 INJECTION, SOLUTION INTRAVENOUS at 08:20

## 2023-01-01 RX ADMIN — SODIUM CHLORIDE, PRESERVATIVE FREE 10 ML: 5 INJECTION INTRAVENOUS at 05:02

## 2023-01-01 RX ADMIN — CALCIUM CHLORIDE, MAGNESIUM CHLORIDE, DEXTROSE MONOHYDRATE, LACTIC ACID, SODIUM CHLORIDE, SODIUM BICARBONATE AND POTASSIUM CHLORIDE: 5.15; 2.03; 22; 5.4; 6.46; 3.09; .157 INJECTION INTRAVENOUS at 02:53

## 2023-01-01 RX ADMIN — Medication: at 16:49

## 2023-01-01 RX ADMIN — LEVETIRACETAM 500 MG: 100 INJECTION INTRAVENOUS at 09:27

## 2023-01-01 RX ADMIN — CALCIUM CHLORIDE, MAGNESIUM CHLORIDE, DEXTROSE MONOHYDRATE, LACTIC ACID, SODIUM CHLORIDE, SODIUM BICARBONATE AND POTASSIUM CHLORIDE: 3.68; 3.05; 22; 5.4; 6.46; 3.09; .314 INJECTION INTRAVENOUS at 09:06

## 2023-01-01 RX ADMIN — CINACALCET HYDROCHLORIDE 60 MG: 30 TABLET, FILM COATED ORAL at 18:56

## 2023-01-01 RX ADMIN — Medication: at 18:35

## 2023-01-01 RX ADMIN — FENTANYL CITRATE 200 MCG/HR: 0.05 INJECTION, SOLUTION INTRAMUSCULAR; INTRAVENOUS at 01:22

## 2023-01-01 RX ADMIN — CALCIUM CHLORIDE, MAGNESIUM CHLORIDE, DEXTROSE MONOHYDRATE, LACTIC ACID, SODIUM CHLORIDE, SODIUM BICARBONATE AND POTASSIUM CHLORIDE: 3.68; 3.05; 22; 5.4; 6.46; 3.09; .314 INJECTION INTRAVENOUS at 02:25

## 2023-01-01 RX ADMIN — HYDROCORTISONE SODIUM SUCCINATE 50 MG: 100 INJECTION, POWDER, FOR SOLUTION INTRAMUSCULAR; INTRAVENOUS at 08:10

## 2023-01-01 RX ADMIN — DEXMEDETOMIDINE HYDROCHLORIDE 1 MCG/KG/HR: 4 INJECTION, SOLUTION INTRAVENOUS at 20:06

## 2023-01-01 RX ADMIN — APIXABAN 5 MG: 5 TABLET, FILM COATED ORAL at 09:00

## 2023-01-01 RX ADMIN — BUMETANIDE 1 MG: 1 TABLET ORAL at 08:51

## 2023-01-01 RX ADMIN — CINACALCET HYDROCHLORIDE 60 MG: 30 TABLET, FILM COATED ORAL at 08:54

## 2023-01-01 RX ADMIN — MUPIROCIN: 20 OINTMENT TOPICAL at 17:38

## 2023-01-01 RX ADMIN — METRONIDAZOLE 500 MG: 500 INJECTION, SOLUTION INTRAVENOUS at 09:14

## 2023-01-01 RX ADMIN — Medication 16 UNITS: at 08:58

## 2023-01-01 RX ADMIN — IOPAMIDOL 100 ML: 755 INJECTION, SOLUTION INTRAVENOUS at 00:31

## 2023-01-01 RX ADMIN — MIDAZOLAM 4 MG: 1 INJECTION INTRAMUSCULAR; INTRAVENOUS at 13:07

## 2023-01-01 RX ADMIN — ACETAMINOPHEN 650 MG: 325 TABLET ORAL at 09:20

## 2023-01-01 RX ADMIN — Medication 8 UNITS: at 22:11

## 2023-01-01 RX ADMIN — ALBUMIN (HUMAN) 12.5 G: 12.5 INJECTION, SOLUTION INTRAVENOUS at 22:54

## 2023-01-01 RX ADMIN — ALBUMIN (HUMAN) 12.5 G: 0.25 INJECTION, SOLUTION INTRAVENOUS at 11:15

## 2023-01-01 RX ADMIN — POTASSIUM CHLORIDE 20 MEQ: 750 TABLET, FILM COATED, EXTENDED RELEASE ORAL at 19:14

## 2023-01-01 RX ADMIN — PROPOFOL 40 MCG/KG/MIN: 10 INJECTION, EMULSION INTRAVENOUS at 16:09

## 2023-01-01 RX ADMIN — AMIODARONE HYDROCHLORIDE 400 MG: 200 TABLET ORAL at 09:53

## 2023-01-01 RX ADMIN — ALBUMIN (HUMAN) 12.5 G: 0.25 INJECTION, SOLUTION INTRAVENOUS at 09:30

## 2023-01-01 RX ADMIN — SODIUM CHLORIDE, PRESERVATIVE FREE 10 ML: 5 INJECTION INTRAVENOUS at 14:56

## 2023-01-01 RX ADMIN — AMIODARONE HYDROCHLORIDE 400 MG: 200 TABLET ORAL at 08:46

## 2023-01-01 RX ADMIN — ALBUMIN (HUMAN) 12.5 G: 0.25 INJECTION, SOLUTION INTRAVENOUS at 00:05

## 2023-01-01 RX ADMIN — LEVETIRACETAM 500 MG: 100 INJECTION INTRAVENOUS at 08:12

## 2023-01-01 RX ADMIN — MILRINONE LACTATE IN DEXTROSE 0.38 MCG/KG/MIN: 200 INJECTION, SOLUTION INTRAVENOUS at 14:33

## 2023-01-01 RX ADMIN — DEXMEDETOMIDINE HYDROCHLORIDE 1.5 MCG/KG/HR: 4 INJECTION, SOLUTION INTRAVENOUS at 23:58

## 2023-01-01 RX ADMIN — SODIUM ZIRCONIUM CYCLOSILICATE 5 G: 5 POWDER, FOR SUSPENSION ORAL at 11:36

## 2023-01-01 RX ADMIN — METRONIDAZOLE 500 MG: 500 INJECTION, SOLUTION INTRAVENOUS at 21:10

## 2023-01-01 RX ADMIN — AMIODARONE HYDROCHLORIDE 400 MG: 200 TABLET ORAL at 17:39

## 2023-01-01 RX ADMIN — NEOMYCIN SULFATE, POLYMYXIN B SULFATE, AND DEXAMETHASONE: 3.5; 10000; 1 OINTMENT OPHTHALMIC at 18:37

## 2023-01-01 RX ADMIN — CALCIUM CHLORIDE, MAGNESIUM CHLORIDE, DEXTROSE MONOHYDRATE, LACTIC ACID, SODIUM CHLORIDE, SODIUM BICARBONATE AND POTASSIUM CHLORIDE: 5.15; 2.03; 22; 5.4; 6.46; 3.09; .157 INJECTION INTRAVENOUS at 05:54

## 2023-01-01 RX ADMIN — FLUCONAZOLE IN SODIUM CHLORIDE 200 MG: 2 INJECTION, SOLUTION INTRAVENOUS at 04:16

## 2023-01-01 RX ADMIN — ROSUVASTATIN 20 MG: 10 TABLET, FILM COATED ORAL at 21:32

## 2023-01-01 RX ADMIN — SENNOSIDES AND DOCUSATE SODIUM 1 TABLET: 50; 8.6 TABLET ORAL at 08:56

## 2023-01-01 RX ADMIN — Medication 3 UNITS: at 06:57

## 2023-01-01 RX ADMIN — FENTANYL CITRATE 100 MCG/HR: 0.05 INJECTION, SOLUTION INTRAMUSCULAR; INTRAVENOUS at 06:05

## 2023-01-01 RX ADMIN — SODIUM CHLORIDE, PRESERVATIVE FREE 10 ML: 5 INJECTION INTRAVENOUS at 21:34

## 2023-01-01 RX ADMIN — SODIUM CHLORIDE, PRESERVATIVE FREE 10 ML: 5 INJECTION INTRAVENOUS at 15:33

## 2023-01-01 RX ADMIN — PIPERACILLIN AND TAZOBACTAM 3.38 G: 3; .375 INJECTION, POWDER, FOR SOLUTION INTRAVENOUS at 21:34

## 2023-01-01 RX ADMIN — SODIUM CHLORIDE 2.3 UNITS/HR: 9 INJECTION, SOLUTION INTRAVENOUS at 11:51

## 2023-01-01 RX ADMIN — DEXTROSE MONOHYDRATE 50 ML: 100 INJECTION, SOLUTION INTRAVENOUS at 04:13

## 2023-01-01 RX ADMIN — METRONIDAZOLE 500 MG: 500 INJECTION, SOLUTION INTRAVENOUS at 20:37

## 2023-01-01 RX ADMIN — BUMETANIDE 1 MG: 1 TABLET ORAL at 17:03

## 2023-01-01 RX ADMIN — OXYCODONE HYDROCHLORIDE 10 MG: 5 TABLET ORAL at 18:57

## 2023-01-01 RX ADMIN — EPINEPHRINE 2 MCG/MIN: 1 INJECTION INTRAMUSCULAR; INTRAVENOUS; SUBCUTANEOUS at 05:50

## 2023-01-01 RX ADMIN — LEVETIRACETAM 500 MG: 100 INJECTION INTRAVENOUS at 21:12

## 2023-01-01 RX ADMIN — LEVETIRACETAM 500 MG: 100 INJECTION INTRAVENOUS at 08:22

## 2023-01-01 RX ADMIN — APIXABAN 10 MG: 5 TABLET, FILM COATED ORAL at 19:58

## 2023-01-01 RX ADMIN — PIPERACILLIN AND TAZOBACTAM 3.38 G: 3; .375 INJECTION, POWDER, FOR SOLUTION INTRAVENOUS at 05:11

## 2023-01-01 RX ADMIN — HYDROMORPHONE HYDROCHLORIDE 1 MG: 1 INJECTION, SOLUTION INTRAMUSCULAR; INTRAVENOUS; SUBCUTANEOUS at 09:52

## 2023-01-01 RX ADMIN — Medication 6 UNITS: at 16:26

## 2023-01-01 RX ADMIN — Medication 5 UNITS: at 11:51

## 2023-01-01 RX ADMIN — NOREPINEPHRINE BITARTRATE 5 MCG/MIN: 1 INJECTION, SOLUTION, CONCENTRATE INTRAVENOUS at 05:47

## 2023-01-01 RX ADMIN — MEROPENEM 1 G: 1 INJECTION, POWDER, FOR SOLUTION INTRAVENOUS at 06:13

## 2023-01-01 RX ADMIN — SODIUM CHLORIDE, PRESERVATIVE FREE 10 ML: 5 INJECTION INTRAVENOUS at 13:52

## 2023-01-01 RX ADMIN — Medication 5 UNITS: at 20:22

## 2023-01-01 RX ADMIN — Medication 16 UNITS: at 08:56

## 2023-01-01 RX ADMIN — SODIUM CHLORIDE, PRESERVATIVE FREE 10 ML: 5 INJECTION INTRAVENOUS at 05:44

## 2023-01-01 RX ADMIN — HYDROMORPHONE HYDROCHLORIDE 0.5 MG: 1 INJECTION, SOLUTION INTRAMUSCULAR; INTRAVENOUS; SUBCUTANEOUS at 17:38

## 2023-01-01 RX ADMIN — BISACODYL 10 MG: 10 SUPPOSITORY RECTAL at 15:20

## 2023-01-01 RX ADMIN — SODIUM CHLORIDE, PRESERVATIVE FREE 40 MG: 5 INJECTION INTRAVENOUS at 08:48

## 2023-01-01 RX ADMIN — SODIUM CHLORIDE 6 ML/HR: 9 INJECTION, SOLUTION INTRAVENOUS at 12:09

## 2023-01-01 RX ADMIN — DEXMEDETOMIDINE HYDROCHLORIDE 1 MCG/KG/HR: 4 INJECTION, SOLUTION INTRAVENOUS at 15:06

## 2023-01-01 RX ADMIN — SODIUM CHLORIDE, PRESERVATIVE FREE 10 ML: 5 INJECTION INTRAVENOUS at 21:22

## 2023-01-01 RX ADMIN — Medication 1 UNITS: at 00:18

## 2023-01-01 RX ADMIN — POTASSIUM CHLORIDE 40 MEQ: 750 TABLET, FILM COATED, EXTENDED RELEASE ORAL at 17:15

## 2023-01-01 RX ADMIN — DEXMEDETOMIDINE HYDROCHLORIDE 1 MCG/KG/HR: 4 INJECTION, SOLUTION INTRAVENOUS at 14:37

## 2023-01-01 RX ADMIN — SUCRALFATE 1 G: 1 TABLET ORAL at 17:14

## 2023-01-01 RX ADMIN — MEROPENEM 1 G: 1 INJECTION, POWDER, FOR SOLUTION INTRAVENOUS at 18:00

## 2023-01-01 RX ADMIN — HEPARIN SODIUM 1500 UNITS: 1000 INJECTION INTRAVENOUS; SUBCUTANEOUS at 06:45

## 2023-01-01 RX ADMIN — ALBUMIN (HUMAN) 12.5 G: 0.25 INJECTION, SOLUTION INTRAVENOUS at 11:24

## 2023-01-01 RX ADMIN — SODIUM CHLORIDE, PRESERVATIVE FREE 10 ML: 5 INJECTION INTRAVENOUS at 07:07

## 2023-01-01 RX ADMIN — Medication: at 09:31

## 2023-01-01 RX ADMIN — CALCIUM CHLORIDE, MAGNESIUM CHLORIDE, DEXTROSE MONOHYDRATE, LACTIC ACID, SODIUM CHLORIDE, SODIUM BICARBONATE AND POTASSIUM CHLORIDE: 3.68; 3.05; 22; 5.4; 6.46; 3.09; .314 INJECTION INTRAVENOUS at 03:00

## 2023-01-01 RX ADMIN — ALBUMIN (HUMAN) 12.5 G: 0.25 INJECTION, SOLUTION INTRAVENOUS at 11:28

## 2023-01-01 RX ADMIN — DOBUTAMINE HYDROCHLORIDE 2 MCG/KG/MIN: 200 INJECTION INTRAVENOUS at 07:53

## 2023-01-01 RX ADMIN — ALBUMIN (HUMAN) 12.5 G: 0.25 INJECTION, SOLUTION INTRAVENOUS at 23:30

## 2023-01-01 RX ADMIN — MUPIROCIN: 20 OINTMENT TOPICAL at 17:21

## 2023-01-01 RX ADMIN — SODIUM CHLORIDE, PRESERVATIVE FREE 10 ML: 5 INJECTION INTRAVENOUS at 05:40

## 2023-01-01 RX ADMIN — ALBUMIN (HUMAN) 12.5 G: 0.25 INJECTION, SOLUTION INTRAVENOUS at 18:45

## 2023-01-01 RX ADMIN — Medication 16 UNITS: at 08:45

## 2023-01-01 RX ADMIN — CALCIUM CHLORIDE, MAGNESIUM CHLORIDE, DEXTROSE MONOHYDRATE, LACTIC ACID, SODIUM CHLORIDE, SODIUM BICARBONATE AND POTASSIUM CHLORIDE: 5.15; 2.03; 22; 5.4; 6.46; 3.09; .157 INJECTION INTRAVENOUS at 02:57

## 2023-01-01 RX ADMIN — CALCIUM CHLORIDE, MAGNESIUM CHLORIDE, DEXTROSE MONOHYDRATE, LACTIC ACID, SODIUM CHLORIDE, SODIUM BICARBONATE AND POTASSIUM CHLORIDE: 3.68; 3.05; 22; 5.4; 6.46; 3.09; .314 INJECTION INTRAVENOUS at 16:02

## 2023-01-01 RX ADMIN — SODIUM CHLORIDE, PRESERVATIVE FREE 10 ML: 5 INJECTION INTRAVENOUS at 14:11

## 2023-01-01 RX ADMIN — HEPARIN SODIUM 1660 UNITS: 1000 INJECTION INTRAVENOUS; SUBCUTANEOUS at 06:22

## 2023-01-01 RX ADMIN — Medication 1 MG/HR: at 04:53

## 2023-01-01 RX ADMIN — PROPOFOL 35 MCG/KG/MIN: 10 INJECTION, EMULSION INTRAVENOUS at 17:05

## 2023-01-01 RX ADMIN — EPOETIN ALFA-EPBX 10000 UNITS: 10000 INJECTION, SOLUTION INTRAVENOUS; SUBCUTANEOUS at 22:03

## 2023-01-01 RX ADMIN — FUROSEMIDE 40 MG: 10 INJECTION, SOLUTION INTRAMUSCULAR; INTRAVENOUS at 08:54

## 2023-01-01 RX ADMIN — BUMETANIDE 1 MG: 1 TABLET ORAL at 17:28

## 2023-01-01 RX ADMIN — ALTEPLASE 2 MG: 2.2 INJECTION, POWDER, LYOPHILIZED, FOR SOLUTION INTRAVENOUS at 04:25

## 2023-01-01 RX ADMIN — METRONIDAZOLE 500 MG: 500 INJECTION, SOLUTION INTRAVENOUS at 08:58

## 2023-01-01 RX ADMIN — Medication 2 UNITS: at 12:20

## 2023-01-01 RX ADMIN — Medication: at 20:23

## 2023-01-01 RX ADMIN — SODIUM CHLORIDE, PRESERVATIVE FREE 10 ML: 5 INJECTION INTRAVENOUS at 22:43

## 2023-01-01 RX ADMIN — NEOMYCIN SULFATE, POLYMYXIN B SULFATE, AND DEXAMETHASONE: 3.5; 10000; 1 OINTMENT OPHTHALMIC at 18:45

## 2023-01-01 RX ADMIN — OXYCODONE HYDROCHLORIDE 5 MG: 5 TABLET ORAL at 13:41

## 2023-01-01 RX ADMIN — SODIUM CHLORIDE 2 UNITS/HR: 9 INJECTION, SOLUTION INTRAVENOUS at 04:06

## 2023-01-01 RX ADMIN — WARFARIN SODIUM 2 MG: 2 TABLET ORAL at 17:31

## 2023-01-01 RX ADMIN — COLCHICINE 0.6 MG: 0.6 TABLET, FILM COATED ORAL at 08:50

## 2023-01-01 RX ADMIN — DOXYCYCLINE 100 MG: 100 INJECTION, POWDER, LYOPHILIZED, FOR SOLUTION INTRAVENOUS at 14:56

## 2023-01-01 RX ADMIN — EPINEPHRINE 3 MCG/MIN: 1 INJECTION INTRAMUSCULAR; INTRAVENOUS; SUBCUTANEOUS at 00:54

## 2023-01-01 RX ADMIN — DEXMEDETOMIDINE HYDROCHLORIDE 0.5 MCG/KG/HR: 4 INJECTION, SOLUTION INTRAVENOUS at 00:03

## 2023-01-01 RX ADMIN — Medication: at 09:00

## 2023-01-01 RX ADMIN — ALTEPLASE 3 MG: 2.2 INJECTION, POWDER, LYOPHILIZED, FOR SOLUTION INTRAVENOUS at 11:36

## 2023-01-01 RX ADMIN — HEPARIN SODIUM 1700 UNITS: 1000 INJECTION INTRAVENOUS; SUBCUTANEOUS at 18:53

## 2023-01-01 RX ADMIN — ZOLPIDEM TARTRATE 5 MG: 5 TABLET ORAL at 20:20

## 2023-01-01 RX ADMIN — Medication 20 UNITS: at 21:06

## 2023-01-01 RX ADMIN — Medication: at 10:16

## 2023-01-01 RX ADMIN — POTASSIUM CHLORIDE 20 MEQ: 750 TABLET, FILM COATED, EXTENDED RELEASE ORAL at 10:30

## 2023-01-01 RX ADMIN — APIXABAN 5 MG: 5 TABLET, FILM COATED ORAL at 18:38

## 2023-01-01 RX ADMIN — CINACALCET HYDROCHLORIDE 60 MG: 30 TABLET, FILM COATED ORAL at 20:04

## 2023-01-01 RX ADMIN — APIXABAN 10 MG: 5 TABLET, FILM COATED ORAL at 09:05

## 2023-01-01 RX ADMIN — Medication 4 UNITS: at 17:23

## 2023-01-01 RX ADMIN — SODIUM PHOSPHATE, MONOBASIC, MONOHYDRATE AND SODIUM PHOSPHATE, DIBASIC, ANHYDROUS: 276; 142 INJECTION, SOLUTION INTRAVENOUS at 17:14

## 2023-01-01 RX ADMIN — Medication: at 09:06

## 2023-01-01 RX ADMIN — SODIUM CHLORIDE, PRESERVATIVE FREE 10 ML: 5 INJECTION INTRAVENOUS at 20:33

## 2023-01-01 RX ADMIN — APIXABAN 10 MG: 5 TABLET, FILM COATED ORAL at 17:23

## 2023-01-01 RX ADMIN — METOPROLOL SUCCINATE 12.5 MG: 25 TABLET, EXTENDED RELEASE ORAL at 20:18

## 2023-01-01 RX ADMIN — ALBUMIN (HUMAN) 12.5 G: 0.25 INJECTION, SOLUTION INTRAVENOUS at 12:05

## 2023-01-01 RX ADMIN — COLCHICINE 0.6 MG: 0.6 TABLET, FILM COATED ORAL at 08:48

## 2023-01-01 RX ADMIN — ALBUMIN (HUMAN) 12.5 G: 0.25 INJECTION, SOLUTION INTRAVENOUS at 00:24

## 2023-01-01 RX ADMIN — HEPARIN SODIUM 5000 UNITS: 5000 INJECTION INTRAVENOUS; SUBCUTANEOUS at 08:09

## 2023-01-01 RX ADMIN — Medication 2 UNITS: at 17:17

## 2023-01-01 RX ADMIN — SODIUM CHLORIDE, PRESERVATIVE FREE 10 ML: 5 INJECTION INTRAVENOUS at 21:26

## 2023-01-01 RX ADMIN — RIFAMPIN 600 MG: 600 INJECTION, POWDER, LYOPHILIZED, FOR SOLUTION INTRAVENOUS at 09:06

## 2023-01-01 RX ADMIN — HYDROMORPHONE HYDROCHLORIDE 1 MG: 1 INJECTION, SOLUTION INTRAMUSCULAR; INTRAVENOUS; SUBCUTANEOUS at 12:34

## 2023-01-01 RX ADMIN — DEXMEDETOMIDINE HYDROCHLORIDE 1.5 MCG/KG/HR: 4 INJECTION, SOLUTION INTRAVENOUS at 04:09

## 2023-01-01 RX ADMIN — CALCIUM CHLORIDE, MAGNESIUM CHLORIDE, DEXTROSE MONOHYDRATE, LACTIC ACID, SODIUM CHLORIDE, SODIUM BICARBONATE AND POTASSIUM CHLORIDE: 3.68; 3.05; 22; 5.4; 6.46; 3.09; .314 INJECTION INTRAVENOUS at 20:17

## 2023-01-01 RX ADMIN — SODIUM CHLORIDE, PRESERVATIVE FREE 10 ML: 5 INJECTION INTRAVENOUS at 05:01

## 2023-01-01 RX ADMIN — METOPROLOL SUCCINATE 12.5 MG: 25 TABLET, EXTENDED RELEASE ORAL at 09:41

## 2023-01-01 RX ADMIN — CALCIUM CHLORIDE, MAGNESIUM CHLORIDE, DEXTROSE MONOHYDRATE, LACTIC ACID, SODIUM CHLORIDE, SODIUM BICARBONATE AND POTASSIUM CHLORIDE: 3.68; 3.05; 22; 5.4; 6.46; 3.09; .314 INJECTION INTRAVENOUS at 20:42

## 2023-01-01 RX ADMIN — ALTEPLASE 3 MG: 2.2 INJECTION, POWDER, LYOPHILIZED, FOR SOLUTION INTRAVENOUS at 09:19

## 2023-01-01 RX ADMIN — PROPOFOL 40 MCG/KG/MIN: 10 INJECTION, EMULSION INTRAVENOUS at 21:48

## 2023-01-01 RX ADMIN — DOBUTAMINE HYDROCHLORIDE 5 MCG/KG/MIN: 200 INJECTION INTRAVENOUS at 07:53

## 2023-01-01 RX ADMIN — RIFAMPIN 600 MG: 600 INJECTION, POWDER, LYOPHILIZED, FOR SOLUTION INTRAVENOUS at 03:50

## 2023-01-01 RX ADMIN — APIXABAN 5 MG: 5 TABLET, FILM COATED ORAL at 18:23

## 2023-01-01 RX ADMIN — FENTANYL CITRATE 25 MCG: 50 INJECTION, SOLUTION INTRAMUSCULAR; INTRAVENOUS at 07:15

## 2023-01-01 RX ADMIN — CALCIUM CHLORIDE, MAGNESIUM CHLORIDE, DEXTROSE MONOHYDRATE, LACTIC ACID, SODIUM CHLORIDE, SODIUM BICARBONATE AND POTASSIUM CHLORIDE: 3.68; 3.05; 22; 5.4; 6.46; 3.09; .314 INJECTION INTRAVENOUS at 13:52

## 2023-01-01 RX ADMIN — WHITE PETROLATUM 57.7 %-MINERAL OIL 31.9 % EYE OINTMENT: at 22:14

## 2023-01-01 RX ADMIN — WHITE PETROLATUM 57.7 %-MINERAL OIL 31.9 % EYE OINTMENT: at 08:40

## 2023-01-01 RX ADMIN — SODIUM CHLORIDE, PRESERVATIVE FREE 10 ML: 5 INJECTION INTRAVENOUS at 21:44

## 2023-01-01 RX ADMIN — ALBUMIN (HUMAN) 12.5 G: 0.25 INJECTION, SOLUTION INTRAVENOUS at 05:58

## 2023-01-01 RX ADMIN — Medication 8 UNITS: at 09:44

## 2023-01-01 RX ADMIN — TRAZODONE HYDROCHLORIDE 50 MG: 50 TABLET ORAL at 22:05

## 2023-01-01 RX ADMIN — CINACALCET HYDROCHLORIDE 90 MG: 30 TABLET, FILM COATED ORAL at 08:17

## 2023-01-01 RX ADMIN — Medication 2 UNITS: at 11:33

## 2023-01-01 RX ADMIN — SODIUM CHLORIDE, PRESERVATIVE FREE 10 ML: 5 INJECTION INTRAVENOUS at 06:28

## 2023-01-01 RX ADMIN — POLYETHYLENE GLYCOL 3350 17 G: 17 POWDER, FOR SOLUTION ORAL at 08:56

## 2023-01-01 RX ADMIN — CALCIUM CHLORIDE, MAGNESIUM CHLORIDE, DEXTROSE MONOHYDRATE, LACTIC ACID, SODIUM CHLORIDE, SODIUM BICARBONATE AND POTASSIUM CHLORIDE: 3.68; 3.05; 22; 5.4; 6.46; 3.09; .314 INJECTION INTRAVENOUS at 11:25

## 2023-01-01 RX ADMIN — SODIUM CHLORIDE, PRESERVATIVE FREE 10 ML: 5 INJECTION INTRAVENOUS at 06:52

## 2023-01-01 RX ADMIN — MIDAZOLAM 6 MG: 1 INJECTION INTRAMUSCULAR; INTRAVENOUS at 13:41

## 2023-01-01 RX ADMIN — NOREPINEPHRINE BITARTRATE 1 MCG/MIN: 1 INJECTION, SOLUTION, CONCENTRATE INTRAVENOUS at 04:03

## 2023-01-01 RX ADMIN — CINACALCET HYDROCHLORIDE 60 MG: 30 TABLET, FILM COATED ORAL at 17:34

## 2023-01-01 RX ADMIN — ALBUMIN (HUMAN) 25 G: 0.25 INJECTION, SOLUTION INTRAVENOUS at 05:39

## 2023-01-01 RX ADMIN — MIDAZOLAM 2 MG: 1 INJECTION INTRAMUSCULAR; INTRAVENOUS at 02:30

## 2023-01-01 RX ADMIN — Medication 7 UNITS: at 12:58

## 2023-01-01 RX ADMIN — MILRINONE LACTATE IN DEXTROSE 0.38 MCG/KG/MIN: 200 INJECTION, SOLUTION INTRAVENOUS at 13:41

## 2023-01-01 RX ADMIN — ALBUMIN (HUMAN) 12.5 G: 0.25 INJECTION, SOLUTION INTRAVENOUS at 00:18

## 2023-01-01 RX ADMIN — TRAZODONE HYDROCHLORIDE 50 MG: 50 TABLET ORAL at 20:54

## 2023-01-01 RX ADMIN — DEXMEDETOMIDINE HYDROCHLORIDE 0.4 MCG/KG/HR: 4 INJECTION, SOLUTION INTRAVENOUS at 12:58

## 2023-01-01 RX ADMIN — CALCIUM CHLORIDE, MAGNESIUM CHLORIDE, DEXTROSE MONOHYDRATE, LACTIC ACID, SODIUM CHLORIDE, SODIUM BICARBONATE AND POTASSIUM CHLORIDE: 3.68; 3.05; 22; 5.4; 6.46; 3.09; .314 INJECTION INTRAVENOUS at 00:10

## 2023-01-01 RX ADMIN — HYDROMORPHONE HYDROCHLORIDE 0.5 MG: 1 INJECTION, SOLUTION INTRAMUSCULAR; INTRAVENOUS; SUBCUTANEOUS at 03:40

## 2023-01-01 RX ADMIN — Medication 6 UNITS: at 09:00

## 2023-01-01 RX ADMIN — ASPIRIN 81 MG: 81 TABLET, COATED ORAL at 09:13

## 2023-01-01 RX ADMIN — Medication: at 17:02

## 2023-01-01 RX ADMIN — Medication 8 UNITS: at 08:30

## 2023-01-01 RX ADMIN — Medication 2 UNITS: at 21:03

## 2023-01-01 RX ADMIN — SODIUM CHLORIDE, PRESERVATIVE FREE 10 ML: 5 INJECTION INTRAVENOUS at 16:10

## 2023-01-01 RX ADMIN — PROPOFOL 40 MCG/KG/MIN: 10 INJECTION, EMULSION INTRAVENOUS at 09:39

## 2023-01-01 RX ADMIN — ALBUMIN (HUMAN) 12.5 G: 0.25 INJECTION, SOLUTION INTRAVENOUS at 06:40

## 2023-01-01 RX ADMIN — CALCIUM CHLORIDE, MAGNESIUM CHLORIDE, DEXTROSE MONOHYDRATE, LACTIC ACID, SODIUM CHLORIDE, SODIUM BICARBONATE AND POTASSIUM CHLORIDE: 3.68; 3.05; 22; 5.4; 6.46; 3.09; .314 INJECTION INTRAVENOUS at 05:11

## 2023-01-01 RX ADMIN — SODIUM CHLORIDE, PRESERVATIVE FREE 10 ML: 5 INJECTION INTRAVENOUS at 13:32

## 2023-01-01 RX ADMIN — Medication 500 UNITS: at 09:02

## 2023-01-01 RX ADMIN — ASPIRIN 81 MG: 81 TABLET, COATED ORAL at 09:41

## 2023-01-01 RX ADMIN — Medication 16 UNITS: at 20:45

## 2023-01-01 RX ADMIN — METRONIDAZOLE 500 MG: 500 INJECTION, SOLUTION INTRAVENOUS at 20:31

## 2023-01-01 RX ADMIN — EPINEPHRINE 4 MCG/MIN: 1 INJECTION INTRAMUSCULAR; INTRAVENOUS; SUBCUTANEOUS at 07:28

## 2023-01-01 RX ADMIN — SODIUM BICARBONATE: 84 INJECTION, SOLUTION INTRAVENOUS at 04:57

## 2023-01-01 RX ADMIN — Medication 3 UNITS: at 17:14

## 2023-01-01 RX ADMIN — HYDROMORPHONE HYDROCHLORIDE 0.5 MG: 1 INJECTION, SOLUTION INTRAMUSCULAR; INTRAVENOUS; SUBCUTANEOUS at 11:58

## 2023-01-01 RX ADMIN — NEOMYCIN SULFATE, POLYMYXIN B SULFATE, AND DEXAMETHASONE: 3.5; 10000; 1 OINTMENT OPHTHALMIC at 17:34

## 2023-01-01 RX ADMIN — CALCIUM CHLORIDE, MAGNESIUM CHLORIDE, DEXTROSE MONOHYDRATE, LACTIC ACID, SODIUM CHLORIDE, SODIUM BICARBONATE AND POTASSIUM CHLORIDE: 3.68; 3.05; 22; 5.4; 6.46; 3.09; .314 INJECTION INTRAVENOUS at 08:00

## 2023-01-01 RX ADMIN — Medication: at 20:07

## 2023-01-01 RX ADMIN — EPOETIN ALFA-EPBX 10000 UNITS: 10000 INJECTION, SOLUTION INTRAVENOUS; SUBCUTANEOUS at 21:08

## 2023-01-01 RX ADMIN — SODIUM CHLORIDE 2.2 UNITS/HR: 9 INJECTION, SOLUTION INTRAVENOUS at 06:59

## 2023-01-01 RX ADMIN — VANCOMYCIN HYDROCHLORIDE 750 MG: 750 INJECTION, POWDER, LYOPHILIZED, FOR SOLUTION INTRAVENOUS at 16:08

## 2023-01-01 RX ADMIN — SODIUM CHLORIDE, PRESERVATIVE FREE 10 ML: 5 INJECTION INTRAVENOUS at 05:23

## 2023-01-01 RX ADMIN — POLYETHYLENE GLYCOL 3350 17 G: 17 POWDER, FOR SOLUTION ORAL at 09:11

## 2023-01-01 RX ADMIN — ALTEPLASE 3 MG: 2.2 INJECTION, POWDER, LYOPHILIZED, FOR SOLUTION INTRAVENOUS at 01:09

## 2023-01-01 RX ADMIN — Medication 500 UNITS: at 09:13

## 2023-01-01 RX ADMIN — Medication: at 08:30

## 2023-01-01 RX ADMIN — PIPERACILLIN AND TAZOBACTAM 3.38 G: 3; .375 INJECTION, POWDER, FOR SOLUTION INTRAVENOUS at 05:08

## 2023-01-01 RX ADMIN — COLCHICINE 0.6 MG: 0.6 TABLET, FILM COATED ORAL at 08:56

## 2023-01-01 RX ADMIN — MILRINONE LACTATE IN DEXTROSE 0.38 MCG/KG/MIN: 200 INJECTION, SOLUTION INTRAVENOUS at 04:40

## 2023-01-01 RX ADMIN — CALCIUM CHLORIDE, MAGNESIUM CHLORIDE, DEXTROSE MONOHYDRATE, LACTIC ACID, SODIUM CHLORIDE, SODIUM BICARBONATE AND POTASSIUM CHLORIDE: 3.68; 3.05; 22; 5.4; 6.46; 3.09; .314 INJECTION INTRAVENOUS at 17:48

## 2023-01-01 RX ADMIN — DEXMEDETOMIDINE HYDROCHLORIDE 1.5 MCG/KG/HR: 4 INJECTION, SOLUTION INTRAVENOUS at 01:18

## 2023-01-01 NOTE — TELEPHONE ENCOUNTER
Reviewed all records from recent hospital admissions thoroughly . Called patient's wife and discussed with her. Addressed concerns and answered all questions. She appreciated my call and time to discuss patient's health.   thanks

## 2023-01-01 NOTE — PROGRESS NOTES
NAME: Dominic Welsh        :  1950        MRN:  [id number]         Assessment:    TANNER on CKD stage 3b, in the setting of urinary retention, poor hemodynamics and low EF at risk for CRS            Non oliguric, creatinine better, now 2  Urinary retention/hydro s/p donnelly  Hyponatremia, resolved  Hypokalemia, resolved  Cardiomyopathy/low EF  Anemia  Hypercalcemia started on high dose po vit D on      Plan:    S/p bumex gtt, now on bumex 2 mg iv q12   On , per Cards  Continue OFF vit D  SPEP and flc ratio pending  PTH 67  MRI/cardiac at [facility] on tuesday    Subjective:     Chief Complaint:  not currently sob. Poor appetite. No n/v. No edema. No lh/dz. Review of Systems:    Symptom Y/N Comments  Symptom Y/N Comments   Fever/Chills    Chest Pain     Poor Appetite    Edema     Cough    Abdominal Pain     Sputum    Joint Pain     SOB/DONALDSON    Pruritis/Rash     Nausea/vomit    Tolerating PT/OT     Diarrhea    Tolerating Diet     Constipation    Other       Could not obtain due to:      Objective:     VITALS:   Last 24hrs VS reviewed since prior progress note.  Most recent are:  Visit Vitals  /62   Pulse (!) 105   Temp 97.9 °F (36.6 °C)   Resp 15   Ht 5' 9\" (1.753 m)   Wt 49.9 kg (110 lb 0.2 oz)   SpO2 98%   BMI 16.25 kg/m²       Intake/Output Summary (Last 24 hours) at 2023 1844  Last data filed at 2023 0200  Gross per 24 hour   Intake 836.77 ml   Output 1750 ml   Net -913.23 ml      Telemetry Reviewed:     PHYSICAL EXAM:  General: NAD      Lab Data Reviewed: (see below)    Medications Reviewed: (see below)    PMH/SH reviewed - no change compared to H&P  ________________________________________________________________________  Care Plan discussed with:  Patient     Family      RN     Care Manager                    Consultant:          Comments   >50% of visit spent in counseling and coordination of care ________________________________________________________________________  Katerina Schwartz MD     Procedures: see electronic medical records for all procedures/Xrays and details which  were not copied into this note but were reviewed prior to creation of Plan. LABS:  Recent Labs     01/01/23 0233 12/31/22 0314   WBC 20.8* 13.6*   HGB 10.2* 9.7*   HCT 34.4* 33.0*    330     Recent Labs     01/01/23 0233 12/31/22 0314 12/31/22 0306 12/30/22 0314    133*  --  134*   K 4.3 3.6  --  3.4*    97  --  95*   CO2 26 26  --  27   * 111*  --  94*   CREA 2.01* 2.30*  --  2.47*   * 315*  --  186*   CA 10.6* 10.5* 10.7* 10.5*   MG 2.9* 2.8*  --  2.8*   PHOS 4.0 3.8  --  5.4*     Recent Labs     01/01/23 0233 12/31/22 0314 12/30/22 0314   * 180* 167*   TP 7.8 8.0 7.9   ALB 4.3 4.4 4.4   GLOB 3.5 3.6 3.5     No results for input(s): INR, PTP, APTT, INREXT in the last 72 hours. No results for input(s): FE, TIBC, PSAT, FERR in the last 72 hours. Lab Results   Component Value Date/Time    Folate 10.5 07/03/2018 09:24 AM      No results for input(s): PH, PCO2, PO2 in the last 72 hours. No results for input(s): CPK, CKMB in the last 72 hours.     No lab exists for component: TROPONINI  No components found for: [place]  Lab Results   Component Value Date/Time    Color YELLOW/STRAW 12/24/2022 03:32 PM    Appearance CLOUDY (A) 12/24/2022 03:32 PM    Specific gravity 1.016 12/24/2022 03:32 PM    Specific gravity 1.020 05/03/2014 08:18 AM    pH (UA) 5.0 12/24/2022 03:32 PM    Protein 100 (A) 12/24/2022 03:32 PM    Glucose 500 (A) 12/24/2022 03:32 PM    Ketone Negative 12/24/2022 03:32 PM    Bilirubin Negative 12/24/2022 03:32 PM    Urobilinogen 1.0 12/24/2022 03:32 PM    Nitrites Negative 12/24/2022 03:32 PM    Leukocyte Esterase MODERATE (A) 12/24/2022 03:32 PM    Epithelial cells FEW 12/24/2022 03:32 PM    Bacteria 2+ (A) 12/24/2022 03:32 PM    WBC 5-10 12/24/2022 03:32 PM    RBC 5-10 12/24/2022 03:32 PM       MEDICATIONS:  Current Facility-Administered Medications   Medication Dose Route Frequency    albuterol-ipratropium (DUO-NEB) 2.5 MG-0.5 MG/3 ML  3 mL Nebulization Q6HWA RT    clopidogreL (PLAVIX) tablet 75 mg  75 mg Oral DAILY    bumetanide (BUMEX) injection 2 mg  2 mg IntraVENous Q12H    insulin glargine (LANTUS) injection 44 Units  44 Units SubCUTAneous DAILY    potassium chloride SR (KLOR-CON 10) tablet 40 mEq  40 mEq Oral BID    pantoprazole (PROTONIX) tablet 40 mg  40 mg Oral ACB    diphenhydrAMINE (BENADRYL) capsule 50 mg  50 mg Oral QHS    hydrocortisone Sod Succ (PF) (SOLU-CORTEF) injection 50 mg  50 mg IntraVENous Q12H    heparin (porcine) injection 5,000 Units  5,000 Units SubCUTAneous Q12H    QUEtiapine (SEROquel) tablet 50 mg  50 mg Oral QHS    lidocaine 4 % patch 1 Patch  1 Patch TransDERmal Q24H    DOBUTamine (DOBUTREX) 500 mg/250 mL (2,000 mcg/mL) infusion  3 mcg/kg/min IntraVENous CONTINUOUS    balsam peru-castor oiL (VENELEX) ointment   Topical BID    alteplase (CATHFLO) 1 mg in sterile water (preservative free) 1 mL injection  1 mg InterCATHeter PRN    bacitracin 500 unit/gram packet 1 Packet  1 Packet Topical PRN    glucose chewable tablet 16 g  4 Tablet Oral PRN    glucagon (GLUCAGEN) injection 1 mg  1 mg IntraMUSCular PRN    dextrose 10 % infusion 0-250 mL  0-250 mL IntraVENous PRN    insulin lispro (HUMALOG) injection   SubCUTAneous AC&HS    insulin lispro (HUMALOG) injection 3 Units  0.05 Units/kg SubCUTAneous TID WITH MEALS    senna-docusate (PERICOLACE) 8.6-50 mg per tablet 1 Tablet  1 Tablet Oral BID PRN    bisacodyL (DULCOLAX) suppository 10 mg  10 mg Rectal QHS PRN    sodium chloride (NS) flush 5-40 mL  5-40 mL IntraVENous Q8H    sodium chloride (NS) flush 5-40 mL  5-40 mL IntraVENous PRN    acetaminophen (TYLENOL) tablet 650 mg  650 mg Oral Q6H PRN    Or    acetaminophen (TYLENOL) suppository 650 mg  650 mg Rectal Q6H PRN    polyethylene glycol (MIRALAX) packet 17 g  17 g Oral DAILY PRN    ondansetron (ZOFRAN ODT) tablet 4 mg  4 mg Oral Q8H PRN    Or    ondansetron (ZOFRAN) injection 4 mg  4 mg IntraVENous Q6H PRN    aspirin delayed-release tablet 81 mg  81 mg Oral DAILY    ipratropium (ATROVENT) 21 mcg (0.03 %) nasal spray 2 Spray  2 Spray Both Nostrils Q12H    tamsulosin (FLOMAX) capsule 0.4 mg  0.4 mg Oral DAILY    sodium chloride (NS) flush 5-40 mL  5-40 mL IntraVENous PRN

## 2023-01-01 NOTE — PROGRESS NOTES
[id number] [facility]  Hospitalist Group                                                                                          Hospitalist Progress Note  Miky Sahni MD  Answering service: [phone] OR [phone] from in house phone        Date of Service:  2023  NAME:  Destiny Hernandez  :  1950  MRN:  [id number]      Admission Summary:   Destiny Hernandez is a 71 y.o. male who presents with progressively worsening shortness of breath for past several days. It has gotten worse to the point that he can only ambulate a few steps due to severe dyspnea and near syncope. Patient also noted abdominal distention which is increasing. Patient with known history of cardiomyopathy and recently admitted for CHF exacerbation a week ago. On presentation to the ER, chest x-ray shows diffuse airspace disease atypical infection versus edema. Also patient was noted to have elevated troponin and BNP. Patient was further evaluated by CT abdomen and pelvis which shows massively distended bladder with some bilateral hydronephrosis, subsequently Donnelly was placed. Patient also with significant leukocytosis as well as tachycardic and elevated lactic acid level and subsequently code sepsis was called. Hospitalist service requested admit the patient for further evaluation management. Interval history / Subjective:   WBC increased, but no complaints. Failed void trial and donnelly was replaced. He does not have an appetite. Denies any SOB or dysuria. Pro calcitonin is reassuring.        Assessment & Plan:      Acute on chronic systolic heart failure exacerbation-EF 25%  Concern for myocarditis  Cardiogenic shock-ongoing  Lactic acidosis  Coronary direct artery disease CAD status post CABG in 2018, PCI  -Cardiology and advanced heart failure following, per their notation suggestion of alternative mechanism to ischemia.  -Continue IV dobutamine ?if this will be long term for home  -s/p Bumex gtt, transition to bumex 2mg Q12 today   -Patient okay to transfer to stepdown unit, bed pending   - Unable to tolerate GDMT due to hypotension  -Viral and immune panel ordered through advanced heart failure services  -Continue hydrocortisone, for presumed myocarditis. Will need to discuss with cardiology regarding taper?   - planning for cardiac MRI next week (Tuesday)     Acute kidney injury on CKD stage III-improving  -Continue Bumex IV  -Nephrology following  -I's and O's     Type 2 diabetes -A1c 6.5%  -Increased 44 of Lantus, sliding scale insulin, and  3 U with meals   -POCT glucose checks and hypoglycemia protocol      Leukocytosis - WBC 20.8 today from 13.6  - No localizing sign of infection   - Check CXR, and urinalysis with micro hold  - Pro calcitonin reassuring  - ?secondary to steroids.   - Monitor daily     Insomnia, hospital delirium-continue Seroquel at bedtime, DC benadryl given age, and possible worsening urinary retention  Hypokalemia-continue potassium supplementation, check daily  BPH-continue tamsulosin, failed void trial 12/31, consider repeating in a few days when off benadryl   PAD, status post right SFA PTCA, follow-up with Dr. Ramez Pearson in 2 to 3 months          Code status: DNR  Prophylaxis: heparin   Care Plan discussed with: pt, RN   Anticipated Disposition: Greater than 48 hours, needs cardiac MRI on [place] [facility]/[place] [facility]  Date Reviewed: 12/5/2022            Codes Class Noted POA    Systolic CHF, acute on chronic (HCC) ICD-10-CM: I50.23  ICD-9-CM: 428.23, 428.0  12/29/2022 Yes        Receiving inotropic medication ICD-10-CM: Z79.899  ICD-9-CM: V49.89  12/29/2022 Unknown        Sepsis ([facility] [address] ICD-10-CM: A41.9  ICD-9-CM: 038.9, 995.91  12/22/2022 Unknown           Review of Systems:   A comprehensive review of systems was negative except for that written in the HPI. Vital Signs:    Last 24hrs VS reviewed since prior progress note.  Most recent are:  Visit Vitals  /60   Pulse 100 Temp 97.9 °F (36.6 °C)   Resp 17   Ht 5' 9\" (1.753 m)   Wt 49.9 kg (110 lb 0.2 oz)   SpO2 99%   BMI 16.25 kg/m²         Intake/Output Summary (Last 24 hours) at 1/1/2023 0847  Last data filed at 1/1/2023 0800  Gross per 24 hour   Intake 628.07 ml   Output 2450 ml   Net -1821.93 ml          Physical Examination:     I had a face to face encounter with this patient and independently examined them on 1/1/2023 as outlined below:          Constitutional:  No acute distress, cooperative, pleasant, frail elderly    ENT:  Oral mucosa dry  oropharynx benign. Resp:  CTA bilaterally. No wheezing/rhonchi/rales. No accessory muscle use. CV:  Regular rhythm, normal rate, no murmurs, gallops, rubs    GI:  Soft, non distended, non tender. normoactive bowel sounds, no hepatosplenomegaly     Musculoskeletal:  No edema, warm, 2+ pulses throughout    Neurologic:  Moves all extremities. Awake and alert, CN II-XII reviewed            Data Review:    Review and/or order of clinical lab test  Review and/or order of tests in the radiology section of CPT  Review and/or order of tests in the medicine section of CPT      Labs:     Recent Labs     01/01/23 0233 12/31/22 0314   WBC 20.8* 13.6*   HGB 10.2* 9.7*   HCT 34.4* 33.0*    330       Recent Labs     01/01/23 0233 12/31/22 0314 12/31/22  0306 12/30/22 0314    133*  --  134*   K 4.3 3.6  --  3.4*    97  --  95*   CO2 26 26  --  27   * 111*  --  94*   CREA 2.01* 2.30*  --  2.47*   * 315*  --  186*   CA 10.6* 10.5* 10.7* 10.5*   MG 2.9* 2.8*  --  2.8*   PHOS 4.0 3.8  --  5.4*       Recent Labs     01/01/23 0233 12/31/22 0314 12/30/22 0314   * 222* 252*   * 180* 167*   TBILI 0.8 0.6 0.7   TP 7.8 8.0 7.9   ALB 4.3 4.4 4.4   GLOB 3.5 3.6 3.5       No results for input(s): INR, PTP, APTT, INREXT, INREXT in the last 72 hours. No results for input(s): FE, TIBC, PSAT, FERR in the last 72 hours.    Lab Results   Component Value Date/Time Folate 10.5 07/03/2018 09:24 AM        No results for input(s): PH, PCO2, PO2 in the last 72 hours. No results for input(s): CPK, CKNDX, TROIQ in the last 72 hours.     No lab exists for component: CPKMB  Lab Results   Component Value Date/Time    Cholesterol, total 143 10/12/2022 09:10 AM    HDL Cholesterol 49 10/12/2022 09:10 AM    LDL, calculated 41.4 10/12/2022 09:10 AM    Triglyceride 263 (H) 10/12/2022 09:10 AM    CHOL/HDL Ratio 2.9 10/12/2022 09:10 AM     Lab Results   Component Value Date/Time    Glucose (POC) 223 (H) 01/01/2023 07:56 AM    Glucose (POC) 185 (H) 12/31/2022 09:05 PM    Glucose (POC) 199 (H) 12/31/2022 04:38 PM    Glucose (POC) 416 (H) 12/31/2022 11:30 AM    Glucose (POC) 252 (H) 12/31/2022 07:52 AM     Lab Results   Component Value Date/Time    Color YELLOW/STRAW 12/24/2022 03:32 PM    Appearance CLOUDY (A) 12/24/2022 03:32 PM    Specific gravity 1.016 12/24/2022 03:32 PM    Specific gravity 1.020 05/03/2014 08:18 AM    pH (UA) 5.0 12/24/2022 03:32 PM    Protein 100 (A) 12/24/2022 03:32 PM    Glucose 500 (A) 12/24/2022 03:32 PM    Ketone Negative 12/24/2022 03:32 PM    Bilirubin Negative 12/24/2022 03:32 PM    Urobilinogen 1.0 12/24/2022 03:32 PM    Nitrites Negative 12/24/2022 03:32 PM    Leukocyte Esterase MODERATE (A) 12/24/2022 03:32 PM    Epithelial cells FEW 12/24/2022 03:32 PM    Bacteria 2+ (A) 12/24/2022 03:32 PM    WBC 5-10 12/24/2022 03:32 PM    RBC 5-10 12/24/2022 03:32 PM         Medications Reviewed:     Current Facility-Administered Medications   Medication Dose Route Frequency    albuterol-ipratropium (DUO-NEB) 2.5 MG-0.5 MG/3 ML  3 mL Nebulization Q6HWA RT    clopidogreL (PLAVIX) tablet 75 mg  75 mg Oral DAILY    bumetanide (BUMEX) injection 2 mg  2 mg IntraVENous Q12H    insulin glargine (LANTUS) injection 44 Units  44 Units SubCUTAneous DAILY    potassium chloride SR (KLOR-CON 10) tablet 40 mEq  40 mEq Oral BID    pantoprazole (PROTONIX) tablet 40 mg  40 mg Oral ACB hydrocortisone Sod Succ (PF) (SOLU-CORTEF) injection 50 mg  50 mg IntraVENous Q12H    heparin (porcine) injection 5,000 Units  5,000 Units SubCUTAneous Q12H    QUEtiapine (SEROquel) tablet 50 mg  50 mg Oral QHS    lidocaine 4 % patch 1 Patch  1 Patch TransDERmal Q24H    DOBUTamine (DOBUTREX) 500 mg/250 mL (2,000 mcg/mL) infusion  3 mcg/kg/min IntraVENous CONTINUOUS    balsam peru-castor oiL (VENELEX) ointment   Topical BID    alteplase (CATHFLO) 1 mg in sterile water (preservative free) 1 mL injection  1 mg InterCATHeter PRN    bacitracin 500 unit/gram packet 1 Packet  1 Packet Topical PRN    glucose chewable tablet 16 g  4 Tablet Oral PRN    glucagon (GLUCAGEN) injection 1 mg  1 mg IntraMUSCular PRN    dextrose 10 % infusion 0-250 mL  0-250 mL IntraVENous PRN    insulin lispro (HUMALOG) injection   SubCUTAneous AC&HS    insulin lispro (HUMALOG) injection 3 Units  0.05 Units/kg SubCUTAneous TID WITH MEALS    senna-docusate (PERICOLACE) 8.6-50 mg per tablet 1 Tablet  1 Tablet Oral BID PRN    bisacodyL (DULCOLAX) suppository 10 mg  10 mg Rectal QHS PRN    sodium chloride (NS) flush 5-40 mL  5-40 mL IntraVENous Q8H    sodium chloride (NS) flush 5-40 mL  5-40 mL IntraVENous PRN    acetaminophen (TYLENOL) tablet 650 mg  650 mg Oral Q6H PRN    Or    acetaminophen (TYLENOL) suppository 650 mg  650 mg Rectal Q6H PRN    polyethylene glycol (MIRALAX) packet 17 g  17 g Oral DAILY PRN    ondansetron (ZOFRAN ODT) tablet 4 mg  4 mg Oral Q8H PRN    Or    ondansetron (ZOFRAN) injection 4 mg  4 mg IntraVENous Q6H PRN    aspirin delayed-release tablet 81 mg  81 mg Oral DAILY    ipratropium (ATROVENT) 21 mcg (0.03 %) nasal spray 2 Spray  2 Spray Both Nostrils Q12H    tamsulosin (FLOMAX) capsule 0.4 mg  0.4 mg Oral DAILY    sodium chloride (NS) flush 5-40 mL  5-40 mL IntraVENous PRN     ______________________________________________________________________  EXPECTED LENGTH OF STAY: 5d 0h  ACTUAL LENGTH OF STAY:          10 Miky Sahni MD

## 2023-01-01 NOTE — PROGRESS NOTES
0730- [place] obtained [place] [facility] [place]  1000- Patient up to the chair with assist.  1330- Patient back to bed. Family visiting at the bedside. 1600- Uneventful afternoon. 2133- Report given to [employer].

## 2023-01-01 NOTE — PROGRESS NOTES
ATTENDING CARDIOLOGIST    The patient was personally examined and chart reviewed. All the elements of history and examination were personally performed and I agree with the plan as listed by advanced practice provider. Treatment plan was addressed with the patient. Subjective:  Stable  Diuresing  BP ok  Net negative 2.9 L    Blood pressure 125/65, pulse 100, temperature 97.9 °F (36.6 °C), resp. rate 18, height 5' 9\" (1.753 m), weight 110 lb 0.2 oz (49.9 kg), SpO2 98 %. Heart: Normal rate, regular rhythm, normal S1/S2, no murmur  Lungs: Clear  Abdomen: Distended  Extremities: no edema      A/P:  1. Dyspnea/decompensated HFrEF- EF 25-30%. Continue low dose dobutamine 3mcg/kg/min + IV bumex gtt. Weight stable but negative 2900 ml fluid balance. Creatinine stable, LFTs slightly decreased. Rapid deterioration in LV function with overall WMA on recent echo appears not to be in teritorry of ischemia suggesting alternative mechanism for  cardiomyopathy (stress SMP vs myocarditis). Basal segments appear to be hyperdynamic compared to apical segments which may point towards a possibility of stress cardiomyopathy. Plan to transfer to [facility] for cardiac MRI Tuesday. Unable to tolerate GDMT for CHF at this time due to borderline hypotension. 2  CAD - sp CABG in 2018, cath 9/8/2021 open LIMA, PCI to OM 2 all the way up to the left main 2/28/2022. Cath 12/6/2022 open LIMA and RCA graft some in-stent restenosis in the long graft from the left main to OM 2 but not severe and the OM1 was more severely diseased in the small vessel. Interventional cardiology felt no reasonable vessel to intervene and recommended medical management. The OM disease is consistent with a lateral wall ischemia demonstrated on prior nuclear stress test.  Continue aspirin, plavix. Holding statin for elevated LFTs. Continue to monitor CBC for anemia. Hgb up to 9.7 currently.     3.  Aortic stenosis mild mean gradient of 7 nn Hg, HUY 1.4 by echo 2022 -mild , murmur     4. Diabetes mellitus type 2- On insulin, management per IM     5. PAD: S/P Right SFA PTCA 22. Continue ASA, plavix. Holding statin for elevated LFTs. Needs follow up with Dr Mitzy Beard in February with MARIANELA/Dopplers    6. 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 DNR status in place       []    High complexity decision making was performed  []    Patient is at high-risk of decompensation with multiple organ involvement                                                                                                                [facility]  Cardiology Care Note                  []Initial visit     [x]Established visit     Patient Name: Peterson Buck - :1950 - BXJ:[id number]  Primary Cardiologist: Merlene Hankisn MD  Consulting Cardiologist: Renetta Maradiaga MD     Reason for initial visit:  dyspnea, decompensated HF, tachycardia. HPI:     CAD with CABG 2018. NSTEMI in 2016 and resolved pulmonary HTN. Stent to circumflex an LAD in . Stress echo in  with a drop in BP with exercise and a drop in EF. Cath on 18 that showed even with a 5F catheter, he dampened with suspected ostial 3 vessel disease. Echo 3/27/2021 = EF 55 to 60% mild AR MR TR LAE MAC  Nuclear 3/18/2021 EF 64% medium size defect apical and inferior lateral reversible ischemia medium defect mid and inferolateral nonreversible appear to be infarction intermediate risk stress test .  PCI 2022--patent LIMA to LAD, vein graft to distal RCA with severe disease in the native vessels Occluded vein graft to OM 2. Native OM 2 severely diseased along with proximal to mid circumflex as well as distal left main. Overall the vessel is significantly remodeled negatively.   Additionally there is significant disease in the first marginal branch which is even smaller as well as AV groove branch right at the takeoff of OM2. Single stent 2.25 x 28 mm Xience was placed extending from the OM 2 into the circumflex into the distal left main and sequentially dilated with 2.25 noncompliant, 2 5 noncompliant, 3 oh noncompliant and 3 5 noncompliant to perform multilevel POT to manage multiple bifurcations on the way. Atherectomy was considered but given small size of the vessels, was not deemed to be absolutely safe for the obtuse marginal lesion. Overall stent expanded fairly well related to the size of the vessels. Normal LVEDP  Nuclear stress October 27, 2022 test showed ischemia similar to 3/15/2021 with a medium size defect in the mid to distal inferolateral and anterolateral segments in the circumflex territory he had septal dyskinesia with an EF of 39%. He had a fixed apical infarct  Echocardiogram October 27, 2022 showed an EF of 45 to 50% TAPSE at 1.2 showing reduced RV function sclerosis of the aortic valve with stenosis that was very mild with a valve area of mean of 7 mmHg and HUY of 1.4 cm². The mitral was thickened with restricted mobility and mild MR.      SUBJECTIVE:    Remains on low dose dobutamine and bumex gtt, negative 2900ml today  Creatinine stable  Denies chest pain, feels a little heart racing from time to time, no arrhythmias noted on telemetry  Reports improvement in dyspnea, no LE edema  Denies lightheadedness or dizziness  Discussed plan to continue diuresis, no family at bedside currently      Assessment and Plan       1. Dyspnea/decompensated HFrEF- EF 25-30%. Continue low dose dobutamine 3mcg/kg/min + IV bumex gtt. Weight stable but negative 2900 ml fluid balance. Creatinine stable, LFTs slightly decreased. Rapid deterioration in LV function with overall WMA on recent echo appears not to be in teritorry of ischemia suggesting alternative mechanism for  cardiomyopathy (stress SMP vs myocarditis).   Basal segments appear to be hyperdynamic compared to apical segments which may point towards a possibility of stress cardiomyopathy. Plan to transfer to [facility] for cardiac MRI Tuesday. Unable to tolerate GDMT for CHF at this time due to borderline hypotension. Slight tachycardia on dobutamine at 5. No edema. 2  CAD - sp CABG in 2018, cath 9/8/2021 open LIMA, PCI to OM 2 all the way up to the left main 2/28/2022. Cath 12/6/2022 open LIMA and RCA graft some in-stent restenosis in the long graft from the left main to OM 2 but not severe and the OM1 was more severely diseased in the small vessel. Interventional cardiology felt no reasonable vessel to intervene and recommended medical management. The OM disease is consistent with a lateral wall ischemia demonstrated on prior nuclear stress test.  Continue aspirin, plavix. Holding statin for elevated LFTs. Continue to monitor CBC for anemia. Hgb up to 9.7 currently. Microcytic indices. 3.  Aortic stenosis mild mean gradient of 7 nn Hg, HYU 1.4 by echo 11/27/2022 -mild , murmur     4. Diabetes mellitus type 2- On insulin, management per IM     5. PAD: S/P Right SFA PTCA 12/6/22. Continue ASA, plavix. Holding statin for elevated LFTs. Needs follow up with Dr Jose Saleh in February with MARIANELA/Dopplers    6. 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 Renal function continues to improve. 7.  DNR status in place          ____________________________________________________________    Cardiac testing  12/22/22    ECHO ADULT COMPLETE 12/26/2022 12/26/2022    Interpretation Summary    Left Ventricle: Severely reduced left ventricular systolic function with a visually estimated EF of 25 - 30%. Left ventricle size is normal. Normal wall thickness. There are regional wall motion abnormalities. Grade II diastolic dysfunction with increased LAP. Right Ventricle: Moderately reduced systolic function. TAPSE is abnormal. TAPSE is 1.1 cm. Aortic Valve: Mild stenosis of the aortic valve.  AV peak gradient is 13 mmHg. AV peak velocity is 1.8 m/s. Mitral Valve: Not well visualized. Moderate annular calcification at the posterior leaflet of the mitral valve. Mild to moderate regurgitation. Tricuspid Valve: Mildly elevated RVSP. Left Atrium: Left atrium is moderately dilated. Signed by: Dhara Hickman MD on 12/26/2022  3:13 PM          10/27/22    NUCLEAR CARDIAC STRESS TEST 10/27/2022 11/1/2022    Interpretation Summary    Nuclear Findings: The study is positive for myocardial ischemia. The defect appears to be ischemia. The areas of ischemia are similar to 3/15/2021 study. Nuclear Findings: There is a moderate severity left ventricular stress perfusion defect that is medium in size present in the mid to distal inferolateral and anterolateral segment(s) that is reversible. The defect is consistent with abnormal perfusion in the LCx territory. There is normal wall motion in the defect area. The defect appears to be probable ischemia. There is a moderate severity second left ventricular stress perfusion defect. The defect appears to be infarction. Nuclear Findings: Abnormal left ventricular systolic function post-stress. Septal dyskinesis. Post-stress ejection fraction is 39%. ECG: Resting ECG demonstrates normal sinus rhythm. ECG: Stress ECG was negative for ischemia. Stress Test: A pharmacological stress test was performed using lexiscan. Blood pressure demonstrated a normal response and heart rate demonstrated a normal response to stress. The patient's heart rate recovery was normal. The patient reported no symptoms during the stress test.    Signed by: Fe Stewart MD on 10/27/2022  4:45 PM    02/28/22    CARDIAC PROCEDURE 02/28/2022 2/28/2022    Conclusion  Findings  1. Severe native multivessel coronary disease  2. Patent LIMA to LAD, vein graft to distal RCA with severe disease in the native vessels  3. Occluded vein graft to OM 2. Native OM 2 severely diseased along with proximal to mid circumflex as well as distal left main. Overall the vessel is significantly remodeled negatively. Additionally there is significant disease in the first marginal branch which is even smaller as well as AV groove branch right at the takeoff of OM2. Single stent 2.25 x 28 mm Xience was placed extending from the OM 2 into the circumflex into the distal left main and sequentially dilated with 2.25 noncompliant, 2 5 noncompliant, 3 oh noncompliant and 3 5 noncompliant to perform multilevel POT to manage multiple bifurcations on the way. Atherectomy was considered but given small size of the vessels, was not deemed to be absolutely safe for the obtuse marginal lesion. Overall stent expanded fairly well related to the size of the vessels. 4.  Normal LVEDP    Access right radial: Small vessel, tortuous} brachiocephalic  Contrast 65 cc    Recommendations  1. Plavix based dual antiplatelet therapy  2. Guideline directed medical therapy for secondary prevention of coronary events    Signed by: Sánchez Santamaria MD on 2/28/2022  4:22 PM    Most recent HS troponins:  No results for input(s): TROPHS in the last 72 hours. No lab exists for component:  CKMB      Review of Systems    [x]All other systems reviewed and all negative except as written in HPI    [] Patient unable to provide secondary to condition         Past Medical History:   Diagnosis Date    CAD (coronary artery disease) 11/10/2016    NSTEMI & 2 stents    Deafness 10/28/2012    DM (diabetes mellitus) ([facility] [address]     Elevated cholesterol     Hypertension     NSTEMI (non-ST elevated myocardial infarction) ([facility] [address] 11/10/2016     Past Surgical History:   Procedure Laterality Date    COLONOSCOPY N/A 6/28/2018    COLONOSCOPY performed by Kelly Costa MD at [address] [place] [address] St  11/11/2016    2 stents     Social Hx:  reports that he has never smoked.  He has never been exposed to tobacco smoke. He has never used smokeless tobacco. He reports current alcohol use of about 2.0 standard drinks per week. He reports that he does not use drugs. Family Hx: family history includes Elevated Lipids in his brother, brother, and brother; Heart Attack in his father; Heart Disease in his father; Hypertension in his mother; No Known Problems in his daughter, sister, and son. No Known Allergies       OBJECTIVE:  Wt Readings from Last 3 Encounters:   01/01/23 110 lb 0.2 oz (49.9 kg)   12/19/22 129 lb (58.5 kg)   12/16/22 126 lb 8.7 oz (57.4 kg)       Intake/Output Summary (Last 24 hours) at 1/1/2023 1016  Last data filed at 1/1/2023 1000  Gross per 24 hour   Intake 529.17 ml   Output 2725 ml   Net -2195.83 ml             Physical Exam    Vitals:   Vitals:    01/01/23 0800 01/01/23 0821 01/01/23 0900 01/01/23 1000   BP: 123/60  110/64 125/65   Pulse: 100  96 100   Resp: 17  17 18   Temp: 97.9 °F (36.6 °C)      SpO2: 97% 99% 100% 98%   Weight:       Height:         Telemetry: normal sinus rhythm    /65   Pulse 100   Temp 97.9 °F (36.6 °C)   Resp 18   Ht 5' 9\" (1.753 m)   Wt 110 lb 0.2 oz (49.9 kg)   SpO2 98%   BMI 16.25 kg/m²   General:    Alert, cooperative, no distress. Psychiatric:    Normal Mood and affect    Eye/ENT:      No asymmetry, Conjunctival pink. Able to hear voice at normal amplitude   Lungs:      Visibly symmetric chest expansion, No palpable tenderness. Diminished bases but clear. Heart[de-identified]    Regular rate and rhythm, S1, S2 normal, no murmur, click, rub or gallop. No JVD, Normal palpable peripheral pulses. Abdomen:     Soft, non-tender. Bowel sounds normal. Slightly distended by soft   Extremities:   Extremities normal, atraumatic, no edema. Neurologic:   CN II-XII grossly intact.  No gross focal deficits       Data Review:     Radiology:   XR Results (most recent):  Results from Hospital Encounter encounter on 12/22/22    XR CHEST PORT    Narrative  INDICATION: Heart Failure    COMPARISON: December 24    FINDINGS: Single AP portable view of the chest obtained at 759 demonstrates a  stable cardiomediastinal silhouette. There are median sternotomy wires. The  lungs are clear bilaterally. Pulmonary edema pattern has resolved. No osseous  abnormalities are seen. Impression  No evidence of acute cardiopulmonary process. CT Results (most recent):  Results from Hospital Encounter encounter on 12/22/22    CT ABD PELV WO CONT    Narrative  EXAM: CT ABD PELV WO CONT    INDICATION: rlq abdominal pain    COMPARISON: 5/3/2014    IV CONTRAST: None. ORAL CONTRAST: None    TECHNIQUE:  Thin axial images were obtained through the abdomen and pelvis. Coronal and  sagittal reformats were generated. CT dose reduction was achieved through use of  a standardized protocol tailored for this examination and automatic exposure  control for dose modulation. The absence of intravenous contrast material reduces the sensitivity for  evaluation of the vasculature and solid organs. FINDINGS:  LOWER THORAX: Small bilateral pleural effusions. Partial collapse bases  LIVER: No mass. BILIARY TREE: Gallstones. No evidence of acute cholecystitis CBD is not dilated. SPLEEN: within normal limits. PANCREAS: No focal abnormality. ADRENALS: 19 mm right adrenal nodule unchanged,  KIDNEYS/URETERS: Mild bilateral hydronephrosis. No stone  STOMACH: Unremarkable. SMALL BOWEL: No dilatation or wall thickening. COLON: No dilatation or wall thickening. APPENDIX: Surgically absent  PERITONEUM: No ascites or pneumoperitoneum. RETROPERITONEUM: No lymphadenopathy or aortic aneurysm. Extensive vascular  calcifications  REPRODUCTIVE ORGANS: Mildly enlarged  URINARY BLADDER: Massive distention  BONES: No destructive bone lesion. ABDOMINAL WALL: Small umbilical hernia containing fat. ADDITIONAL COMMENTS: N/A    Impression  1. Bladder is massively distended with mild bilateral hydronephrosis.  May  indicate bladder outlet obstruction. Prostate is mildly enlarged  2. Gallstones. No acute cholecystitis  3. Small bilateral pleural effusions    MRI Results (most recent):  Results from Hospital Encounter encounter on 05/22/16    MRI LUMB SPINE WO CONT    Narrative  **Final Report**      ICD Codes / Adm. Diagnosis: 724.2  M54.5 / Lumbago  Low back pain  Examination:  MR Vonda Bence CON  - [id number] - May 22 2016  1:45PM  Accession No:  [id number]  Reason:  Low back pain      REPORT:  Indication: Pain and back extends into right leg to foot    Exam: MRI of the lumbar spine. Sequences include sagittal and axial T1 and  T2-weighted images. Sagittal STIR. Comparisons: April 27, 2016    Contrast: None    Findings: Alignment is normal. There is no evidence of marrow replacement or  acute fracture. Cord terminus is within normal limits. Paraspinous soft  tissues are within normal limits. T12-L1: No stenosis    L1-L2: There is desiccation of this disc with a small bulge but no stenosis    L2-L3: There is desiccation of this disc with a small bulge but no stenosis    L3-L4: There is mild left facet arthropathy but no stenosis    L4-L5: There is disc height loss with a small disc bulge. Facet arthropathy. No stenosis    L5-S1: There is disc height loss with a small disc bulge and left  paracentral disc extrusion extending inferiorly. This mildly distorts the  left S1 nerve root in the subarticular zone and left lateral recess. There  are facet degenerative changes with moderate left and mild-to-moderate right  foraminal narrowing    Impression  :  1. Multilevel degenerative change detailed by level above most significant  at L5-S1                Signing/Reading Doctor: Raji Prather ([id number])  Approved: Raji Prather ([id number])  May 23 2016  8:39AM      No results for input(s): CPK, TROIQ in the last 72 hours.     No lab exists for component: CKQMB, CPKMB, BMPP  Recent Labs     01/01/23  0233 12/31/22  0314    133*   K 4.3 3.6    97   CO2 26 26   * 111*   CREA 2.01* 2.30*   * 315*   PHOS 4.0 3.8   CA 10.6* 10.5*       Recent Labs     01/01/23  0233 12/31/22  0314   WBC 20.8* 13.6*   HGB 10.2* 9.7*   HCT 34.4* 33.0*    330       Recent Labs     01/01/23 0233 12/31/22  0314   * 180*         No results for input(s): CHOL, LDLC in the last 72 hours. No lab exists for component: TGL, HDLC,  HBA1C  No results for input(s): CRP, TSH, TSHEXT, TSHEXT in the last 72 hours.     No lab exists for component: ESR      Current meds:    Current Facility-Administered Medications:     albuterol-ipratropium (DUO-NEB) 2.5 MG-0.5 MG/3 ML, 3 mL, Nebulization, Q6HWA RT, Lita Felty, Eluterio Collin I, MD, 3 mL at 01/01/23 5550    clopidogreL (PLAVIX) tablet 75 mg, 75 mg, Oral, DAILY, Ravin Mccormick NP, 75 mg at 01/01/23 0818    bumetanide (BUMEX) injection 2 mg, 2 mg, IntraVENous, Q12H, Desire Dodd MD, 2 mg at 01/01/23 0812    insulin glargine (LANTUS) injection 44 Units, 44 Units, SubCUTAneous, DAILY, Eder HARTLEY MD, 44 Units at 01/01/23 0807    potassium chloride SR (KLOR-CON 10) tablet 40 mEq, 40 mEq, Oral, BID, Thaddeus MOLINA MD, 40 mEq at 01/01/23 0817    pantoprazole (PROTONIX) tablet 40 mg, 40 mg, Oral, ACB, Bennett Avelar, , 40 mg at 01/01/23 0818    hydrocortisone Sod Succ (PF) (SOLU-CORTEF) injection 50 mg, 50 mg, IntraVENous, Q12H, Colleen Astorga NP, 50 mg at 01/01/23 0810    heparin (porcine) injection 5,000 Units, 5,000 Units, SubCUTAneous, Q12H, Bennett Avelar DO, 5,000 Units at 01/01/23 0809    QUEtiapine (SEROquel) tablet 50 mg, 50 mg, Oral, QHS, Bennett Avelar DO, 50 mg at 12/31/22 2113    lidocaine 4 % patch 1 Patch, 1 Patch, TransDERmal, Q24H, Walker Aponte MD, 1 Patch at 12/31/22 1643    DOBUTamine (DOBUTREX) 500 mg/250 mL (2,000 mcg/mL) infusion, 3 mcg/kg/min, IntraVENous, CONTINUOUS, Vira Mueller MD, Last Rate: 5.1 mL/hr at 01/01/23 0526, 3 mcg/kg/min at 01/01/23 0526    balsam peru-castor oiL (VENELEX) ointment, , Topical, BID, Tammy Escobar MD, Given at 01/01/23 0815    alteplase (CATHFLO) 1 mg in sterile water (preservative free) 1 mL injection, 1 mg, InterCATHeter, PRN, Shaggy King MD    bacitracin 500 unit/gram packet 1 Packet, 1 Packet, Topical, PRN, Estiven MOLINA MD    glucose chewable tablet 16 g, 4 Tablet, Oral, PRN, Estiven MOLINA MD    glucagon (GLUCAGEN) injection 1 mg, 1 mg, IntraMUSCular, PRN, Shaggy King MD    dextrose 10 % infusion 0-250 mL, 0-250 mL, IntraVENous, PRN, Walker Barraza MD    insulin lispro (HUMALOG) injection, , SubCUTAneous, AC&HS, Jeffy Jaeger DO, 6 Units at 01/01/23 4829    insulin lispro (HUMALOG) injection 3 Units, 0.05 Units/kg, SubCUTAneous, TID WITH MEALS, Estiven MOLINA MD, 3 Units at 12/31/22 0759    senna-docusate (PERICOLACE) 8.6-50 mg per tablet 1 Tablet, 1 Tablet, Oral, BID PRN, Shaggy King MD, 1 Tablet at 12/26/22 3995    bisacodyL (DULCOLAX) suppository 10 mg, 10 mg, Rectal, QHS PRN, Estiven MOLINA MD    sodium chloride (NS) flush 5-40 mL, 5-40 mL, IntraVENous, Q8H, Walker Aponte MD, 10 mL at 01/01/23 0523    sodium chloride (NS) flush 5-40 mL, 5-40 mL, IntraVENous, PRN, Shaggy King MD, 10 mL at 12/28/22 0845    acetaminophen (TYLENOL) tablet 650 mg, 650 mg, Oral, Q6H PRN, 650 mg at 12/26/22 1714 **OR** acetaminophen (TYLENOL) suppository 650 mg, 650 mg, Rectal, Q6H PRN, Walker Barraza MD    polyethylene glycol (MIRALAX) packet 17 g, 17 g, Oral, DAILY PRN, Shaggy King MD, 17 g at 12/26/22 0649    ondansetron (ZOFRAN ODT) tablet 4 mg, 4 mg, Oral, Q8H PRN **OR** ondansetron (ZOFRAN) injection 4 mg, 4 mg, IntraVENous, Q6H PRN, Estiven MOLINA MD, 4 mg at 12/24/22 0849    aspirin delayed-release tablet 81 mg, 81 mg, Oral, DAILY, Sharon Navas MD, 81 mg at 01/01/23 0818    ipratropium (ATROVENT) 21 mcg (0.03 %) nasal spray 2 Spray, 2 Spray, Both Nostrils, Q12H, Ranjit Pearson MD, 2 [place] at 01/01/23 0817    tamsulosin (FLOMAX) capsule 0.4 mg, 0.4 mg, Oral, DAILY, Ranjit Pearson MD, 0.4 mg at 01/01/23 0818    sodium chloride (NS) flush 5-40 mL, 5-40 mL, IntraVENous, PRN, NaomMD Isha Evans MD  [facility], [address] [address] [place] [address]  [phone]      Regulo Ybarra MD

## 2023-01-01 NOTE — PROGRESS NOTES
Verbal bedside shift change report given to Damaris Verde (oncoming nurse) by Donal Gonzalez  (offgoing nurse). Report included the following information SBAR, Kardex, Procedure Summary, Intake/Output, MAR, Recent Results, Cardiac Rhythm Sinus Tachy, and Alarm Parameters.       2220 >500mL bladder scan, straight cath w/ coude, 450 out  0415 >450 bladder scan, attempted 16Fr donnelly placement, unable to pass  0515 14Fr donnelly placement 400mL slowly trickled out over next 45 minutes    0730 Report to [employer]

## 2023-01-02 LAB
ALBUMIN SERPL-MCNC: 4 G/DL (ref 3.5–5)
ALBUMIN/GLOB SERPL: 1.1 (ref 1.1–2.2)
ALP SERPL-CCNC: 173 U/L (ref 45–117)
ALT SERPL-CCNC: 137 U/L (ref 12–78)
ANION GAP SERPL CALC-SCNC: 11 MMOL/L (ref 5–15)
AST SERPL-CCNC: 46 U/L (ref 15–37)
B19V DNA SPEC QL NAA+PROBE: NEGATIVE
BASOPHILS # BLD: 0 K/UL (ref 0–0.1)
BASOPHILS NFR BLD: 0 % (ref 0–1)
BILIRUB SERPL-MCNC: 0.8 MG/DL (ref 0.2–1)
BUN SERPL-MCNC: 105 MG/DL (ref 6–20)
BUN/CREAT SERPL: 54 (ref 12–20)
CALCIUM SERPL-MCNC: 10.3 MG/DL (ref 8.5–10.1)
CHLORIDE SERPL-SCNC: 101 MMOL/L (ref 97–108)
CO2 SERPL-SCNC: 25 MMOL/L (ref 21–32)
CREAT SERPL-MCNC: 1.93 MG/DL (ref 0.7–1.3)
DIFFERENTIAL METHOD BLD: ABNORMAL
EBV DNA SPEC QL NAA+PROBE: POSITIVE
EOSINOPHIL # BLD: 0 K/UL (ref 0–0.4)
EOSINOPHIL NFR BLD: 0 % (ref 0–7)
ERYTHROCYTE [DISTWIDTH] IN BLOOD BY AUTOMATED COUNT: 20.7 % (ref 11.5–14.5)
GLOBULIN SER CALC-MCNC: 3.6 G/DL (ref 2–4)
GLUCOSE BLD STRIP.AUTO-MCNC: 220 MG/DL (ref 65–117)
GLUCOSE BLD STRIP.AUTO-MCNC: 225 MG/DL (ref 65–117)
GLUCOSE BLD STRIP.AUTO-MCNC: 314 MG/DL (ref 65–117)
GLUCOSE BLD STRIP.AUTO-MCNC: 487 MG/DL (ref 65–117)
GLUCOSE SERPL-MCNC: 256 MG/DL (ref 65–100)
HCT VFR BLD AUTO: 33.8 % (ref 36.6–50.3)
HGB BLD-MCNC: 10 G/DL (ref 12.1–17)
IMM GRANULOCYTES # BLD AUTO: 0.2 K/UL (ref 0–0.04)
IMM GRANULOCYTES NFR BLD AUTO: 1 % (ref 0–0.5)
LYMPHOCYTES # BLD: 0.7 K/UL (ref 0.8–3.5)
LYMPHOCYTES NFR BLD: 4 % (ref 12–49)
MAGNESIUM SERPL-MCNC: 2.8 MG/DL (ref 1.6–2.4)
MCH RBC QN AUTO: 22.9 PG (ref 26–34)
MCHC RBC AUTO-ENTMCNC: 29.6 G/DL (ref 30–36.5)
MCV RBC AUTO: 77.3 FL (ref 80–99)
MONOCYTES # BLD: 0.5 K/UL (ref 0–1)
MONOCYTES NFR BLD: 3 % (ref 5–13)
NEUTS SEG # BLD: 15.9 K/UL (ref 1.8–8)
NEUTS SEG NFR BLD: 92 % (ref 32–75)
NRBC # BLD: 0 K/UL (ref 0–0.01)
NRBC BLD-RTO: 0 PER 100 WBC
PHOSPHATE SERPL-MCNC: 4.8 MG/DL (ref 2.6–4.7)
PLATELET # BLD AUTO: 305 K/UL (ref 150–400)
PMV BLD AUTO: 10.9 FL (ref 8.9–12.9)
POTASSIUM SERPL-SCNC: 4.6 MMOL/L (ref 3.5–5.1)
PROCALCITONIN SERPL-MCNC: 0.38 NG/ML
PROT SERPL-MCNC: 7.6 G/DL (ref 6.4–8.2)
RBC # BLD AUTO: 4.37 M/UL (ref 4.1–5.7)
RBC MORPH BLD: ABNORMAL
RBC MORPH BLD: ABNORMAL
SERVICE CMNT-IMP: ABNORMAL
SODIUM SERPL-SCNC: 137 MMOL/L (ref 136–145)
SPECIMEN SOURCE: ABNORMAL
WBC # BLD AUTO: 17.3 K/UL (ref 4.1–11.1)

## 2023-01-02 PROCEDURE — 74011636637 HC RX REV CODE- 636/637: Performed by: STUDENT IN AN ORGANIZED HEALTH CARE EDUCATION/TRAINING PROGRAM

## 2023-01-02 PROCEDURE — 85025 COMPLETE CBC W/AUTO DIFF WBC: CPT

## 2023-01-02 PROCEDURE — 65660000001 HC RM ICU INTERMED STEPDOWN

## 2023-01-02 PROCEDURE — 74011250637 HC RX REV CODE- 250/637: Performed by: INTERNAL MEDICINE

## 2023-01-02 PROCEDURE — 74011250637 HC RX REV CODE- 250/637: Performed by: STUDENT IN AN ORGANIZED HEALTH CARE EDUCATION/TRAINING PROGRAM

## 2023-01-02 PROCEDURE — 36415 COLL VENOUS BLD VENIPUNCTURE: CPT

## 2023-01-02 PROCEDURE — 74011250636 HC RX REV CODE- 250/636: Performed by: STUDENT IN AN ORGANIZED HEALTH CARE EDUCATION/TRAINING PROGRAM

## 2023-01-02 PROCEDURE — 82962 GLUCOSE BLOOD TEST: CPT

## 2023-01-02 PROCEDURE — 83735 ASSAY OF MAGNESIUM: CPT

## 2023-01-02 PROCEDURE — 99233 SBSQ HOSP IP/OBS HIGH 50: CPT | Performed by: SPECIALIST

## 2023-01-02 PROCEDURE — 94640 AIRWAY INHALATION TREATMENT: CPT

## 2023-01-02 PROCEDURE — 84100 ASSAY OF PHOSPHORUS: CPT

## 2023-01-02 PROCEDURE — 74011250636 HC RX REV CODE- 250/636: Performed by: NURSE PRACTITIONER

## 2023-01-02 PROCEDURE — 74011250637 HC RX REV CODE- 250/637: Performed by: NURSE PRACTITIONER

## 2023-01-02 PROCEDURE — 74011000250 HC RX REV CODE- 250: Performed by: INTERNAL MEDICINE

## 2023-01-02 PROCEDURE — 74011636637 HC RX REV CODE- 636/637: Performed by: INTERNAL MEDICINE

## 2023-01-02 PROCEDURE — 84145 PROCALCITONIN (PCT): CPT

## 2023-01-02 PROCEDURE — 74011250637 HC RX REV CODE- 250/637: Performed by: FAMILY MEDICINE

## 2023-01-02 PROCEDURE — 80053 COMPREHEN METABOLIC PANEL: CPT

## 2023-01-02 PROCEDURE — 74011000250 HC RX REV CODE- 250: Performed by: STUDENT IN AN ORGANIZED HEALTH CARE EDUCATION/TRAINING PROGRAM

## 2023-01-02 RX ORDER — INSULIN LISPRO 100 [IU]/ML
5 INJECTION, SOLUTION INTRAVENOUS; SUBCUTANEOUS
Status: DISCONTINUED | OUTPATIENT
Start: 2023-01-02 | End: 2023-01-04

## 2023-01-02 RX ORDER — MIRTAZAPINE 15 MG/1
7.5 TABLET, FILM COATED ORAL
Status: DISCONTINUED | OUTPATIENT
Start: 2023-01-02 | End: 2023-01-19 | Stop reason: HOSPADM

## 2023-01-02 RX ORDER — INSULIN GLARGINE 100 [IU]/ML
48 INJECTION, SOLUTION SUBCUTANEOUS DAILY
Status: DISCONTINUED | OUTPATIENT
Start: 2023-01-03 | End: 2023-01-04

## 2023-01-02 RX ADMIN — SODIUM CHLORIDE, PRESERVATIVE FREE 10 ML: 5 INJECTION INTRAVENOUS at 18:41

## 2023-01-02 RX ADMIN — ASPIRIN 81 MG: 81 TABLET, COATED ORAL at 08:25

## 2023-01-02 RX ADMIN — Medication 6 UNITS: at 21:41

## 2023-01-02 RX ADMIN — Medication 5 UNITS: at 12:32

## 2023-01-02 RX ADMIN — INSULIN GLARGINE 44 UNITS: 100 INJECTION, SOLUTION SUBCUTANEOUS at 08:24

## 2023-01-02 RX ADMIN — Medication 6 UNITS: at 17:52

## 2023-01-02 RX ADMIN — HEPARIN SODIUM 5000 UNITS: 5000 INJECTION INTRAVENOUS; SUBCUTANEOUS at 08:24

## 2023-01-02 RX ADMIN — CLOPIDOGREL BISULFATE 75 MG: 75 TABLET ORAL at 08:25

## 2023-01-02 RX ADMIN — HEPARIN SODIUM 5000 UNITS: 5000 INJECTION INTRAVENOUS; SUBCUTANEOUS at 21:41

## 2023-01-02 RX ADMIN — Medication 9 UNITS: at 08:24

## 2023-01-02 RX ADMIN — QUETIAPINE FUMARATE 50 MG: 25 TABLET ORAL at 21:40

## 2023-01-02 RX ADMIN — IPRATROPIUM BROMIDE AND ALBUTEROL SULFATE 3 ML: .5; 3 SOLUTION RESPIRATORY (INHALATION) at 22:02

## 2023-01-02 RX ADMIN — BUMETANIDE 2 MG: 0.25 INJECTION, SOLUTION INTRAMUSCULAR; INTRAVENOUS at 08:24

## 2023-01-02 RX ADMIN — BUMETANIDE 2 MG: 0.25 INJECTION, SOLUTION INTRAMUSCULAR; INTRAVENOUS at 21:41

## 2023-01-02 RX ADMIN — Medication: at 08:45

## 2023-01-02 RX ADMIN — MIRTAZAPINE 7.5 MG: 15 TABLET, FILM COATED ORAL at 21:41

## 2023-01-02 RX ADMIN — SODIUM CHLORIDE, PRESERVATIVE FREE 10 ML: 5 INJECTION INTRAVENOUS at 21:41

## 2023-01-02 RX ADMIN — HYDROCORTISONE SODIUM SUCCINATE 50 MG: 100 INJECTION, POWDER, FOR SOLUTION INTRAMUSCULAR; INTRAVENOUS at 21:41

## 2023-01-02 RX ADMIN — IPRATROPIUM BROMIDE 2 SPRAY: 21 SPRAY, METERED NASAL at 21:42

## 2023-01-02 RX ADMIN — HYDROCORTISONE SODIUM SUCCINATE 50 MG: 100 INJECTION, POWDER, FOR SOLUTION INTRAMUSCULAR; INTRAVENOUS at 08:24

## 2023-01-02 RX ADMIN — Medication: at 17:53

## 2023-01-02 RX ADMIN — IPRATROPIUM BROMIDE AND ALBUTEROL SULFATE 3 ML: .5; 3 SOLUTION RESPIRATORY (INHALATION) at 08:28

## 2023-01-02 RX ADMIN — Medication 12 UNITS: at 12:31

## 2023-01-02 RX ADMIN — POTASSIUM CHLORIDE 40 MEQ: 750 TABLET, FILM COATED, EXTENDED RELEASE ORAL at 17:53

## 2023-01-02 RX ADMIN — TAMSULOSIN HYDROCHLORIDE 0.4 MG: 0.4 CAPSULE ORAL at 08:25

## 2023-01-02 RX ADMIN — SODIUM CHLORIDE, PRESERVATIVE FREE 10 ML: 5 INJECTION INTRAVENOUS at 07:10

## 2023-01-02 RX ADMIN — POTASSIUM CHLORIDE 40 MEQ: 750 TABLET, FILM COATED, EXTENDED RELEASE ORAL at 08:25

## 2023-01-02 RX ADMIN — PANTOPRAZOLE SODIUM 40 MG: 40 TABLET, DELAYED RELEASE ORAL at 07:09

## 2023-01-02 NOTE — PROGRESS NOTES
NAME: Tana Roberts        :  1950        MRN:  [id number]         Assessment:    TANNER on CKD stage 3b, in the setting of urinary retention, poor hemodynamics and low EF at risk for CRS            Non oliguric, creatinine better, now 2 to 1.9  Urinary retention/hydro s/p donnelly  Hyponatremia, resolved  Hypokalemia, resolved  Cardiomyopathy/low EF  Anemia  Hypercalcemia started on high dose po vit D on      Plan:    S/p bumex gtt, now on bumex 2 mg iv q12 - change to PO soon  On , per Cards  Continue OFF vit D  SPEP and flc ratio pending  PTH 67  MRI/cardiac at [facility] on tuesday    Subjective:     Chief Complaint:  not currently sob. Oob in chair. Family present. Still poor appetite - family brought food from home, so hopefully that will help. No n/v. No edema. No lh/dz. Review of Systems:    Symptom Y/N Comments  Symptom Y/N Comments   Fever/Chills    Chest Pain     Poor Appetite    Edema     Cough    Abdominal Pain     Sputum    Joint Pain     SOB/DONALDSON    Pruritis/Rash     Nausea/vomit    Tolerating PT/OT     Diarrhea    Tolerating Diet     Constipation    Other       Could not obtain due to:      Objective:     VITALS:   Last 24hrs VS reviewed since prior progress note.  Most recent are:  Visit Vitals  /62   Pulse 93   Temp 97.9 °F (36.6 °C)   Resp 17   Ht 5' 9\" (1.753 m)   Wt 49.6 kg (109 lb 5.6 oz)   SpO2 97%   BMI 16.15 kg/m²       Intake/Output Summary (Last 24 hours) at 2023 5253  Last data filed at 2023 0700  Gross per 24 hour   Intake 422.4 ml   Output 2950 ml   Net -2527.6 ml        Telemetry Reviewed:     PHYSICAL EXAM:  General: NAD      Lab Data Reviewed: (see below)    Medications Reviewed: (see below)    PMH/SH reviewed - no change compared to H&P  ________________________________________________________________________  Care Plan discussed with:  Patient     Family      RN     Care Manager Consultant:          Comments   >50% of visit spent in counseling and coordination of care       ________________________________________________________________________  Norma Toussaint MD     Procedures: see electronic medical records for all procedures/Xrays and details which  were not copied into this note but were reviewed prior to creation of Plan. LABS:  Recent Labs     01/02/23 0351 01/01/23 0233   WBC 17.3* 20.8*   HGB 10.0* 10.2*   HCT 33.8* 34.4*    356       Recent Labs     01/02/23 0351 01/01/23 0233 12/31/22 0314    138 133*   K 4.6 4.3 3.6    102 97   CO2 25 26 26   * 106* 111*   CREA 1.93* 2.01* 2.30*   * 172* 315*   CA 10.3* 10.6* 10.5*   MG 2.8* 2.9* 2.8*   PHOS 4.8* 4.0 3.8       Recent Labs     01/02/23 0351 01/01/23 0233 12/31/22 0314   * 156* 180*   TP 7.6 7.8 8.0   ALB 4.0 4.3 4.4   GLOB 3.6 3.5 3.6       No results for input(s): INR, PTP, APTT, INREXT, INREXT in the last 72 hours. No results for input(s): FE, TIBC, PSAT, FERR in the last 72 hours. Lab Results   Component Value Date/Time    Folate 10.5 07/03/2018 09:24 AM        No results for input(s): PH, PCO2, PO2 in the last 72 hours. No results for input(s): CPK, CKMB in the last 72 hours.     No lab exists for component: TROPONINI  No components found for: [place]  Lab Results   Component Value Date/Time    Color YELLOW/STRAW 01/01/2023 09:13 AM    Appearance CLEAR 01/01/2023 09:13 AM    Specific gravity 1.013 01/01/2023 09:13 AM    Specific gravity 1.020 05/03/2014 08:18 AM    pH (UA) 5.5 01/01/2023 09:13 AM    Protein 30 (A) 01/01/2023 09:13 AM    Glucose Negative 01/01/2023 09:13 AM    Ketone Negative 01/01/2023 09:13 AM    Bilirubin Negative 01/01/2023 09:13 AM    Urobilinogen 1.0 01/01/2023 09:13 AM    Nitrites Negative 01/01/2023 09:13 AM    Leukocyte Esterase Negative 01/01/2023 09:13 AM    Epithelial cells FEW 01/01/2023 09:13 AM    Bacteria Negative 01/01/2023 09:13 AM    WBC 0-4 01/01/2023 09:13 AM    RBC 0-5 01/01/2023 09:13 AM       MEDICATIONS:  Current Facility-Administered Medications   Medication Dose Route Frequency    albuterol-ipratropium (DUO-NEB) 2.5 MG-0.5 MG/3 ML  3 mL Nebulization Q6HWA RT    clopidogreL (PLAVIX) tablet 75 mg  75 mg Oral DAILY    bumetanide (BUMEX) injection 2 mg  2 mg IntraVENous Q12H    insulin glargine (LANTUS) injection 44 Units  44 Units SubCUTAneous DAILY    potassium chloride SR (KLOR-CON 10) tablet 40 mEq  40 mEq Oral BID    pantoprazole (PROTONIX) tablet 40 mg  40 mg Oral ACB    hydrocortisone Sod Succ (PF) (SOLU-CORTEF) injection 50 mg  50 mg IntraVENous Q12H    heparin (porcine) injection 5,000 Units  5,000 Units SubCUTAneous Q12H    QUEtiapine (SEROquel) tablet 50 mg  50 mg Oral QHS    lidocaine 4 % patch 1 Patch  1 Patch TransDERmal Q24H    DOBUTamine (DOBUTREX) 500 mg/250 mL (2,000 mcg/mL) infusion  3 mcg/kg/min IntraVENous CONTINUOUS    balsam peru-castor oiL (VENELEX) ointment   Topical BID    alteplase (CATHFLO) 1 mg in sterile water (preservative free) 1 mL injection  1 mg InterCATHeter PRN    bacitracin 500 unit/gram packet 1 Packet  1 Packet Topical PRN    glucose chewable tablet 16 g  4 Tablet Oral PRN    glucagon (GLUCAGEN) injection 1 mg  1 mg IntraMUSCular PRN    dextrose 10 % infusion 0-250 mL  0-250 mL IntraVENous PRN    insulin lispro (HUMALOG) injection   SubCUTAneous AC&HS    insulin lispro (HUMALOG) injection 3 Units  0.05 Units/kg SubCUTAneous TID WITH MEALS    senna-docusate (PERICOLACE) 8.6-50 mg per tablet 1 Tablet  1 Tablet Oral BID PRN    bisacodyL (DULCOLAX) suppository 10 mg  10 mg Rectal QHS PRN    sodium chloride (NS) flush 5-40 mL  5-40 mL IntraVENous Q8H    sodium chloride (NS) flush 5-40 mL  5-40 mL IntraVENous PRN    acetaminophen (TYLENOL) tablet 650 mg  650 mg Oral Q6H PRN    Or    acetaminophen (TYLENOL) suppository 650 mg  650 mg Rectal Q6H PRN    polyethylene glycol (MIRALAX) packet 17 g  17 g Oral DAILY PRN    ondansetron (ZOFRAN ODT) tablet 4 mg  4 mg Oral Q8H PRN    Or    ondansetron (ZOFRAN) injection 4 mg  4 mg IntraVENous Q6H PRN    aspirin delayed-release tablet 81 mg  81 mg Oral DAILY    ipratropium (ATROVENT) 21 mcg (0.03 %) nasal spray 2 Spray  2 Spray Both Nostrils Q12H    tamsulosin (FLOMAX) capsule 0.4 mg  0.4 mg Oral DAILY    sodium chloride (NS) flush 5-40 mL  5-40 mL IntraVENous PRN

## 2023-01-02 NOTE — PROGRESS NOTES
[id number]  Hospitalist Group                                                                                          Hospitalist Progress Note  Karol Davenport MD  Answering service: [phone] OR [phone] from in house phone        Date of Service:  2023  NAME:  Leandra Carver  :  1950  MRN:  [id number]      Admission Summary:   Leandra Carver is a 71 y.o. male who presents with progressively worsening shortness of breath for past several days. It has gotten worse to the point that he can only ambulate a few steps due to severe dyspnea and near syncope. Patient also noted abdominal distention which is increasing. Patient with known history of cardiomyopathy and recently admitted for CHF exacerbation a week ago. On presentation to the ER, chest x-ray shows diffuse airspace disease atypical infection versus edema. Also patient was noted to have elevated troponin and BNP. Patient was further evaluated by CT abdomen and pelvis which shows massively distended bladder with some bilateral hydronephrosis, subsequently Vasquez was placed. Patient also with significant leukocytosis as well as tachycardic and elevated lactic acid level and subsequently code sepsis was called. Hospitalist service requested admit the patient for further evaluation management.        Interval history / Subjective:   Having decreased appetite        Assessment & Plan:      Acute on chronic systolic heart failure exacerbation-EF 25%  Concern for myocarditis  Cardiogenic shock-ongoing  Lactic acidosis  Coronary direct artery disease CAD status post CABG in 2018, PCI  -Cardiology and advanced heart failure following, per their notation suggestion of alternative mechanism to ischemia.   -Continue IV dobutamine ?if this will be long term for home - need to discuss with cardiology   -Continue bumex 2mg Q12 today   -Patient okay to transfer to stepdown unit, bed pending   - Unable to tolerate GDMT due to hypotension  -Viral and immune panel ordered through advanced heart failure services  -Continue hydrocortisone, for presumed myocarditis. Will need to discuss with cardiology regarding taper?   - planning for cardiac MRI tomorrow (Tuesday)     Acute kidney injury on CKD stage III-improving  -Continue Bumex IV  -Nephrology following  -I's and O's     Type 2 diabetes -A1c 6.5%  -Increased 48 of Lantus, sliding scale insulin, and increase to 5 U with meals   -POCT glucose checks and hypoglycemia protocol      Leukocytosis - WBC 20.8 today from 13.6  - No localizing sign of infection   - Check CXR, and urinalysis with micro hold  - Pro calcitonin reassuring  - ?secondary to steroids.   - Monitor daily     Insomnia, hospital delirium-continue Seroquel at bedtime, DC benadryl given age, and possible worsening urinary retention. Will add low dose mirtazapine for appetite as well   Hypokalemia-continue potassium supplementation, check daily  BPH-continue tamsulosin, failed void trial 12/31, consider repeating in a few days when off benadryl   PAD, status post right SFA PTCA, follow-up with Dr. Shayy White in 2 to 3 months          Code status: DNR  Prophylaxis: heparin   Care Plan discussed with: pt, RN   Anticipated Disposition: Greater than 48 hours, needs cardiac MRI on [facility] Problems  Date Reviewed: 12/5/2022            Codes Class Noted POA    Systolic CHF, acute on chronic (HCC) ICD-10-CM: I50.23  ICD-9-CM: 428.23, 428.0  12/29/2022 Yes        Receiving inotropic medication ICD-10-CM: Z79.899  ICD-9-CM: V49.89  12/29/2022 Unknown        Sepsis ([facility] [address] ICD-10-CM: A41.9  ICD-9-CM: 038.9, 995.91  12/22/2022 Unknown         Review of Systems:   A comprehensive review of systems was negative except for that written in the HPI. Vital Signs:    Last 24hrs VS reviewed since prior progress note.  Most recent are:  Visit Vitals  /62   Pulse 93   Temp 97.6 °F (36.4 °C)   Resp 17   Ht 5' 9\" (1.753 m)   Wt 49.6 kg (109 lb 5.6 oz)   SpO2 97%   BMI 16.15 kg/m²         Intake/Output Summary (Last 24 hours) at 1/2/2023 0858  Last data filed at 1/2/2023 9351  Gross per 24 hour   Intake 317.3 ml   Output 3025 ml   Net -2707.7 ml          Physical Examination:     I had a face to face encounter with this patient and independently examined them on 1/2/2023 as outlined below:          Constitutional:  No acute distress, cooperative, pleasant, frail elderly    ENT:  Oral mucosa dry  oropharynx benign. Resp:  CTA bilaterally. No wheezing/rhonchi/rales. No accessory muscle use. CV:  Regular rhythm, normal rate, no murmurs, gallops, rubs    GI:  Soft, non distended, non tender. normoactive bowel sounds, no hepatosplenomegaly     Musculoskeletal:  No edema, warm, 2+ pulses throughout    Neurologic:  Moves all extremities. Awake and alert, CN II-XII reviewed            Data Review:    Review and/or order of clinical lab test  Review and/or order of tests in the radiology section of CPT  Review and/or order of tests in the medicine section of CPT      Labs:     Recent Labs     01/02/23 0351 01/01/23 0233   WBC 17.3* 20.8*   HGB 10.0* 10.2*   HCT 33.8* 34.4*    356       Recent Labs     01/02/23 0351 01/01/23 0233 12/31/22 0314    138 133*   K 4.6 4.3 3.6    102 97   CO2 25 26 26   * 106* 111*   CREA 1.93* 2.01* 2.30*   * 172* 315*   CA 10.3* 10.6* 10.5*   MG 2.8* 2.9* 2.8*   PHOS 4.8* 4.0 3.8       Recent Labs     01/02/23  0351 01/01/23 0233 12/31/22  0314   * 161* 222*   * 156* 180*   TBILI 0.8 0.8 0.6   TP 7.6 7.8 8.0   ALB 4.0 4.3 4.4   GLOB 3.6 3.5 3.6       No results for input(s): INR, PTP, APTT, INREXT, INREXT in the last 72 hours. No results for input(s): FE, TIBC, PSAT, FERR in the last 72 hours. Lab Results   Component Value Date/Time    Folate 10.5 07/03/2018 09:24 AM        No results for input(s): PH, PCO2, PO2 in the last 72 hours.   No results for input(s): CPK, CKNDX, TROIQ in the last 72 hours.     No lab exists for component: CPKMB  Lab Results   Component Value Date/Time    Cholesterol, total 143 10/12/2022 09:10 AM    HDL Cholesterol 49 10/12/2022 09:10 AM    LDL, calculated 41.4 10/12/2022 09:10 AM    Triglyceride 263 (H) 10/12/2022 09:10 AM    CHOL/HDL Ratio 2.9 10/12/2022 09:10 AM     Lab Results   Component Value Date/Time    Glucose (POC) 314 (H) 01/02/2023 08:00 AM    Glucose (POC) 409 (H) 01/01/2023 08:59 PM    Glucose (POC) 334 (H) 01/01/2023 05:06 PM    Glucose (POC) 299 (H) 01/01/2023 12:18 PM    Glucose (POC) 223 (H) 01/01/2023 07:56 AM     Lab Results   Component Value Date/Time    Color YELLOW/STRAW 01/01/2023 09:13 AM    Appearance CLEAR 01/01/2023 09:13 AM    Specific gravity 1.013 01/01/2023 09:13 AM    Specific gravity 1.020 05/03/2014 08:18 AM    pH (UA) 5.5 01/01/2023 09:13 AM    Protein 30 (A) 01/01/2023 09:13 AM    Glucose Negative 01/01/2023 09:13 AM    Ketone Negative 01/01/2023 09:13 AM    Bilirubin Negative 01/01/2023 09:13 AM    Urobilinogen 1.0 01/01/2023 09:13 AM    Nitrites Negative 01/01/2023 09:13 AM    Leukocyte Esterase Negative 01/01/2023 09:13 AM    Epithelial cells FEW 01/01/2023 09:13 AM    Bacteria Negative 01/01/2023 09:13 AM    WBC 0-4 01/01/2023 09:13 AM    RBC 0-5 01/01/2023 09:13 AM         Medications Reviewed:     Current Facility-Administered Medications   Medication Dose Route Frequency    [START ON 1/3/2023] insulin glargine (LANTUS) injection 48 Units  48 Units SubCUTAneous DAILY    insulin lispro (HUMALOG) injection 5 Units  5 Units SubCUTAneous TID WITH MEALS    mirtazapine (REMERON) tablet 7.5 mg  7.5 mg Oral QHS    albuterol-ipratropium (DUO-NEB) 2.5 MG-0.5 MG/3 ML  3 mL Nebulization Q6HWA RT    clopidogreL (PLAVIX) tablet 75 mg  75 mg Oral DAILY    bumetanide (BUMEX) injection 2 mg  2 mg IntraVENous Q12H    potassium chloride SR (KLOR-CON 10) tablet 40 mEq  40 mEq Oral BID    pantoprazole (PROTONIX) tablet 40 mg  40 mg Oral ACB    hydrocortisone Sod Succ (PF) (SOLU-CORTEF) injection 50 mg  50 mg IntraVENous Q12H    heparin (porcine) injection 5,000 Units  5,000 Units SubCUTAneous Q12H    QUEtiapine (SEROquel) tablet 50 mg  50 mg Oral QHS    lidocaine 4 % patch 1 Patch  1 Patch TransDERmal Q24H    DOBUTamine (DOBUTREX) 500 mg/250 mL (2,000 mcg/mL) infusion  3 mcg/kg/min IntraVENous CONTINUOUS    balsam peru-castor oiL (VENELEX) ointment   Topical BID    alteplase (CATHFLO) 1 mg in sterile water (preservative free) 1 mL injection  1 mg InterCATHeter PRN    bacitracin 500 unit/gram packet 1 Packet  1 Packet Topical PRN    glucose chewable tablet 16 g  4 Tablet Oral PRN    glucagon (GLUCAGEN) injection 1 mg  1 mg IntraMUSCular PRN    dextrose 10 % infusion 0-250 mL  0-250 mL IntraVENous PRN    insulin lispro (HUMALOG) injection   SubCUTAneous AC&HS    senna-docusate (PERICOLACE) 8.6-50 mg per tablet 1 Tablet  1 Tablet Oral BID PRN    bisacodyL (DULCOLAX) suppository 10 mg  10 mg Rectal QHS PRN    sodium chloride (NS) flush 5-40 mL  5-40 mL IntraVENous Q8H    sodium chloride (NS) flush 5-40 mL  5-40 mL IntraVENous PRN    acetaminophen (TYLENOL) tablet 650 mg  650 mg Oral Q6H PRN    Or    acetaminophen (TYLENOL) suppository 650 mg  650 mg Rectal Q6H PRN    polyethylene glycol (MIRALAX) packet 17 g  17 g Oral DAILY PRN    ondansetron (ZOFRAN ODT) tablet 4 mg  4 mg Oral Q8H PRN    Or    ondansetron (ZOFRAN) injection 4 mg  4 mg IntraVENous Q6H PRN    aspirin delayed-release tablet 81 mg  81 mg Oral DAILY    ipratropium (ATROVENT) 21 mcg (0.03 %) nasal spray 2 Spray  2 Spray Both Nostrils Q12H    tamsulosin (FLOMAX) capsule 0.4 mg  0.4 mg Oral DAILY    sodium chloride (NS) flush 5-40 mL  5-40 mL IntraVENous PRN     ______________________________________________________________________  EXPECTED LENGTH OF STAY: 5d 0h  ACTUAL LENGTH OF STAY:          11                 Cortney Montenegro MD

## 2023-01-02 NOTE — PROGRESS NOTES
0730 Bedside and Verbal shift change report given to Ai Quarles (oncoming nurse) by Dhara Olivas (offgoing nurse). Report included the following information SBAR, Kardex, ED Summary, Procedure Summary, Intake/Output, MAR, Accordion, and Recent Results. 1100 Pt up to chair, tolerated well. 1120 Pt's  Dr. Barbie Cardoza notified. Orders for 12units insulin    1300 Pt back to bed.    1500 Pt up to chair tolerated well. 1630 Pt back to bed. 1930 Bedside and Verbal shift change report given to Farhana Dunaway (oncoming nurse) by Ai Quarles (offgoing nurse). Report included the following information SBAR, Kardex, Procedure Summary, Intake/Output, MAR, Accordion, and Recent Results.

## 2023-01-02 NOTE — PROGRESS NOTES
1930: Bedside and Verbal shift change report given to Destiny Borrego RN (oncoming nurse) by Ga Saldana RN (offgoing nurse). Report included the following information SBAR, Kardex, ED Summary, OR Summary, Procedure Summary, Intake/Output, MAR, Recent Results, Cardiac Rhythm SR/ST, and Alarm Parameters . 1945: Lines verified. 0730: Bedside and Verbal shift change report given to Parry Primrose, RN (oncoming nurse) by Destiny Borrego RN (offgoing nurse). Report included the following information SBAR, Kardex, ED Summary, OR Summary, Procedure Summary, Intake/Output, MAR, Recent Results, Cardiac Rhythm SR, and Alarm Parameters .

## 2023-01-02 NOTE — PROGRESS NOTES
ATTENDING CARDIOLOGIST    The patient was personally examined and chart reviewed. All the elements of history and examination were personally performed and I agree with the plan as listed by advanced practice provider. Treatment plan was addressed with the patient. Subjective:  Stable  Diuresing  BP ok  Net negative 2.9 L    Blood pressure (!) 89/54, pulse (!) 102, temperature 97.5 °F (36.4 °C), resp. rate 16, height 5' 9\" (1.753 m), weight 109 lb 5.6 oz (49.6 kg), SpO2 97 %. Heart: Normal rate, regular rhythm, normal S1/S2, no murmur  Lungs: Clear  Abdomen: Distended  Extremities: no edema      A/P:  1. Dyspnea/decompensated HFrEF- EF 25-30%. Continue low dose dobutamine 3mcg/kg/min + IV bumex gtt. Weight stable but negative 2900 ml fluid balance. Creatinine stable, LFTs slightly decreased. Rapid deterioration in LV function with overall WMA on recent echo appears not to be in teritorry of ischemia suggesting alternative mechanism for  cardiomyopathy (stress SMP vs myocarditis). Basal segments appear to be hyperdynamic compared to apical segments which may point towards a possibility of stress cardiomyopathy. Plan to transfer to [facility] for cardiac MRI Tuesday. Unable to tolerate GDMT for CHF at this time due to borderline hypotension. 2  CAD - sp CABG in 2018, cath 9/8/2021 open LIMA, PCI to OM 2 all the way up to the left main 2/28/2022. Cath 12/6/2022 open LIMA and RCA graft some in-stent restenosis in the long graft from the left main to OM 2 but not severe and the OM1 was more severely diseased in the small vessel. Interventional cardiology felt no reasonable vessel to intervene and recommended medical management. The OM disease is consistent with a lateral wall ischemia demonstrated on prior nuclear stress test.  Continue aspirin, plavix. Holding statin for elevated LFTs. Continue to monitor CBC for anemia. Hgb up to 9.7 currently.     3.  Aortic stenosis mild mean gradient of 7 nn Hg, HUY 1.4 by echo 2022 -mild , murmur     4. Diabetes mellitus type 2- On insulin, management per IM     5. PAD: S/P Right SFA PTCA 22. Continue ASA, plavix. Holding statin for elevated LFTs. Needs follow up with Dr Keyshawn Redding in February with MARIANELA/Dopplers    6. 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 DNR status in place       []    High complexity decision making was performed  [x]    Patient is at high-risk of decompensation with multiple organ involvement                                                                                                                [facility]  Cardiology Care Note                  []Initial visit     [x]Established visit     Patient Name: Jim Warren - :1950 - YDU:[id number]  Primary Cardiologist: Lynda Allred MD  Consulting Cardiologist: Dixie Jaramillo MD     Reason for initial visit:  dyspnea, decompensated HF, tachycardia. HPI:     CAD with CABG 2018. NSTEMI in 2016 and resolved pulmonary HTN. Stent to circumflex an LAD in . Stress echo in  with a drop in BP with exercise and a drop in EF. Cath on 18 that showed even with a 5F catheter, he dampened with suspected ostial 3 vessel disease. Echo 3/27/2021 = EF 55 to 60% mild AR MR TR LAE MAC  Nuclear 3/18/2021 EF 64% medium size defect apical and inferior lateral reversible ischemia medium defect mid and inferolateral nonreversible appear to be infarction intermediate risk stress test .  PCI 2022--patent LIMA to LAD, vein graft to distal RCA with severe disease in the native vessels Occluded vein graft to OM 2. Native OM 2 severely diseased along with proximal to mid circumflex as well as distal left main. Overall the vessel is significantly remodeled negatively.   Additionally there is significant disease in the first marginal branch which is even smaller as well as AV groove branch right at the takeoff of OM2. Single stent 2.25 x 28 mm Xience was placed extending from the OM 2 into the circumflex into the distal left main and sequentially dilated with 2.25 noncompliant, 2 5 noncompliant, 3 oh noncompliant and 3 5 noncompliant to perform multilevel POT to manage multiple bifurcations on the way. Atherectomy was considered but given small size of the vessels, was not deemed to be absolutely safe for the obtuse marginal lesion. Overall stent expanded fairly well related to the size of the vessels. Normal LVEDP  Nuclear stress October 27, 2022 test showed ischemia similar to 3/15/2021 with a medium size defect in the mid to distal inferolateral and anterolateral segments in the circumflex territory he had septal dyskinesia with an EF of 39%. He had a fixed apical infarct  Echocardiogram October 27, 2022 showed an EF of 45 to 50% TAPSE at 1.2 showing reduced RV function sclerosis of the aortic valve with stenosis that was very mild with a valve area of mean of 7 mmHg and HUY of 1.4 cm². The mitral was thickened with restricted mobility and mild MR.      SUBJECTIVE:    Remains on low dose dobutamine and bumex gtt, negative 2900ml today  Creatinine stable  Denies chest pain, feels a little heart racing from time to time, no arrhythmias noted on telemetry  Reports improvement in dyspnea, no LE edema  Denies lightheadedness or dizziness  Discussed plan to continue diuresis, no family at bedside currently      Assessment and Plan       1. Dyspnea/decompensated HFrEF- EF 25-30%. Continue low dose dobutamine 3mcg/kg/min + IV bumex gtt. Weight stable but negative 2900 ml fluid balance. Creatinine stable, LFTs slightly decreased. Rapid deterioration in LV function with overall WMA on recent echo appears not to be in teritorry of ischemia suggesting alternative mechanism for  cardiomyopathy (stress SMP vs myocarditis).   Basal segments appear to be hyperdynamic compared to apical segments which may point towards a possibility of stress cardiomyopathy. Plan to transfer [facility] for cardiac MRI Tuesday. Unable to tolerate GDMT for CHF at this time due to borderline hypotension. Slight tachycardia on dobutamine at 5. No edema. He remains slightly tachycardic on dobutamine, will reduce dose. For cardiac MRI tomorrow afternoon. 2  CAD - sp CABG in 2018, cath 9/8/2021 open LIMA, PCI to OM 2 all the way up to the left main 2/28/2022. Cath 12/6/2022 open LIMA and RCA graft some in-stent restenosis in the long graft from the left main to OM 2 but not severe and the OM1 was more severely diseased in the small vessel. Interventional cardiology felt no reasonable vessel to intervene and recommended medical management. The OM disease is consistent with a lateral wall ischemia demonstrated on prior nuclear stress test.  Continue aspirin, plavix. Holding statin for elevated LFTs. Continue to monitor CBC for anemia. Hgb up to 9.7 currently. Microcytic indices. 3.  Aortic stenosis mild mean gradient of 7 nn Hg, HUY 1.4 by echo 11/27/2022 -mild , murmur     4. Diabetes mellitus type 2- On insulin, management per IM     5. PAD: S/P Right SFA PTCA 12/6/22. Continue ASA, plavix. Holding statin for elevated LFTs. Needs follow up with Dr Tigist Vail in February with MARIANELA/Dopplers    6. 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 Renal function continues to improve. 7.  DNR status in place          ____________________________________________________________    Cardiac testing  12/22/22    ECHO ADULT COMPLETE 12/26/2022 12/26/2022    Interpretation Summary    Left Ventricle: Severely reduced left ventricular systolic function with a visually estimated EF of 25 - 30%. Left ventricle size is normal. Normal wall thickness. There are regional wall motion abnormalities. Grade II diastolic dysfunction with increased LAP. Right Ventricle:  Moderately reduced systolic function. TAPSE is abnormal. TAPSE is 1.1 cm. Aortic Valve: Mild stenosis of the aortic valve. AV peak gradient is 13 mmHg. AV peak velocity is 1.8 m/s. Mitral Valve: Not well visualized. Moderate annular calcification at the posterior leaflet of the mitral valve. Mild to moderate regurgitation. Tricuspid Valve: Mildly elevated RVSP. Left Atrium: Left atrium is moderately dilated. Signed by: Sergio Gar MD on 12/26/2022  3:13 PM          10/27/22    NUCLEAR CARDIAC STRESS TEST 10/27/2022 11/1/2022    Interpretation Summary    Nuclear Findings: The study is positive for myocardial ischemia. The defect appears to be ischemia. The areas of ischemia are similar to 3/15/2021 study. Nuclear Findings: There is a moderate severity left ventricular stress perfusion defect that is medium in size present in the mid to distal inferolateral and anterolateral segment(s) that is reversible. The defect is consistent with abnormal perfusion in the LCx territory. There is normal wall motion in the defect area. The defect appears to be probable ischemia. There is a moderate severity second left ventricular stress perfusion defect. The defect appears to be infarction. Nuclear Findings: Abnormal left ventricular systolic function post-stress. Septal dyskinesis. Post-stress ejection fraction is 39%. ECG: Resting ECG demonstrates normal sinus rhythm. ECG: Stress ECG was negative for ischemia. Stress Test: A pharmacological stress test was performed using lexiscan. Blood pressure demonstrated a normal response and heart rate demonstrated a normal response to stress. The patient's heart rate recovery was normal. The patient reported no symptoms during the stress test.    Signed by: Padmini Chatterjee MD on 10/27/2022  4:45 PM    02/28/22    CARDIAC PROCEDURE 02/28/2022 2/28/2022    Conclusion  Findings  1. Severe native multivessel coronary disease  2.   Patent LIMA to LAD, vein graft to distal RCA with severe disease in the native vessels  3. Occluded vein graft to OM 2. Native OM 2 severely diseased along with proximal to mid circumflex as well as distal left main. Overall the vessel is significantly remodeled negatively. Additionally there is significant disease in the first marginal branch which is even smaller as well as AV groove branch right at the takeoff of OM2. Single stent 2.25 x 28 mm Xience was placed extending from the OM 2 into the circumflex into the distal left main and sequentially dilated with 2.25 noncompliant, 2 5 noncompliant, 3 oh noncompliant and 3 5 noncompliant to perform multilevel POT to manage multiple bifurcations on the way. Atherectomy was considered but given small size of the vessels, was not deemed to be absolutely safe for the obtuse marginal lesion. Overall stent expanded fairly well related to the size of the vessels. 4.  Normal LVEDP    Access right radial: Small vessel, tortuous} brachiocephalic  Contrast 65 cc    Recommendations  1. Plavix based dual antiplatelet therapy  2. Guideline directed medical therapy for secondary prevention of coronary events    Signed by: Guy Murguia MD on 2/28/2022  4:22 PM    Most recent HS troponins:  No results for input(s): TROPHS in the last 72 hours.     No lab exists for component:  CKMB      Review of Systems    [x]All other systems reviewed and all negative except as written in HPI    [] Patient unable to provide secondary to condition         Past Medical History:   Diagnosis Date    CAD (coronary artery disease) 11/10/2016    NSTEMI & 2 stents    Deafness 10/28/2012    DM (diabetes mellitus) ([facility] [address]     Elevated cholesterol     Hypertension     NSTEMI (non-ST elevated myocardial infarction) ([facility] [address] 11/10/2016     Past Surgical History:   Procedure Laterality Date    COLONOSCOPY N/A 6/28/2018    COLONOSCOPY performed by Merrill Kilgore MD at [address] [place] [address] SURG PROCEDURE UNLIST  11/11/2016    2 stents     Social Hx:  reports that he has never smoked. He has never been exposed to tobacco smoke. He has never used smokeless tobacco. He reports current alcohol use of about 2.0 standard drinks per week. He reports that he does not use drugs. Family Hx: family history includes Elevated Lipids in his brother, brother, and brother; Heart Attack in his father; Heart Disease in his father; Hypertension in his mother; No Known Problems in his daughter, sister, and son. No Known Allergies       OBJECTIVE:  Wt Readings from Last 3 Encounters:   01/02/23 109 lb 5.6 oz (49.6 kg)   12/19/22 129 lb (58.5 kg)   12/16/22 126 lb 8.7 oz (57.4 kg)       Intake/Output Summary (Last 24 hours) at 1/2/2023 1254  Last data filed at 1/2/2023 1245  Gross per 24 hour   Intake 307.1 ml   Output 3190 ml   Net -2882.9 ml             Physical Exam    Vitals:   Vitals:    01/02/23 0900 01/02/23 1000 01/02/23 1100 01/02/23 1200   BP: (!) 139/58 (!) 110/58 (!) 89/54    Pulse: (!) 105 (!) 104 (!) 102    Resp: 18 20 16    Temp:    97.5 °F (36.4 °C)   SpO2: 100% 98% 97%    Weight:       Height:         Telemetry: normal sinus rhythm    BP (!) 89/54   Pulse (!) 102   Temp 97.5 °F (36.4 °C)   Resp 16   Ht 5' 9\" (1.753 m)   Wt 109 lb 5.6 oz (49.6 kg)   SpO2 97%   BMI 16.15 kg/m²   General:    Alert, cooperative, no distress. Psychiatric:    Normal Mood and affect    Eye/ENT:      No asymmetry, Conjunctival pink. Able to hear voice at normal amplitude   Lungs:      Visibly symmetric chest expansion, No palpable tenderness. Diminished bases but clear. Heart[de-identified]    Regular rate and rhythm, S1, S2 normal, no murmur, click, rub or gallop. No JVD, Normal palpable peripheral pulses. Abdomen:     Soft, non-tender. Bowel sounds normal. Slightly distended by soft   Extremities:   Extremities normal, atraumatic, no edema. Neurologic:   CN II-XII grossly intact.  No gross focal deficits       Data Review: Radiology:   XR Results (most recent):  Results from Hospital Encounter encounter on 12/22/22    XR CHEST PORT    Narrative  INDICATION:  Rule out PNA    EXAM: Chest single view. COMPARISON: 12/31/2022. FINDINGS: A single frontal view of the chest at 0953 hours shows stable  bibasilar atelectasis and interstitial prominence. No new focal infiltrate. Small right pleural effusion stable. .  The heart, mediastinum and pulmonary  vasculature are stable status post median sternotomy . The bony thorax is  unremarkable for age. .    Impression  Stable bibasilar atelectasis and interstitial prominence with small right  pleural effusion. .  . CT Results (most recent):  Results from Hospital Encounter encounter on 12/22/22    CT ABD PELV WO CONT    Narrative  EXAM: CT ABD PELV WO CONT    INDICATION: rlq abdominal pain    COMPARISON: 5/3/2014    IV CONTRAST: None. ORAL CONTRAST: None    TECHNIQUE:  Thin axial images were obtained through the abdomen and pelvis. Coronal and  sagittal reformats were generated. CT dose reduction was achieved through use of  a standardized protocol tailored for this examination and automatic exposure  control for dose modulation. The absence of intravenous contrast material reduces the sensitivity for  evaluation of the vasculature and solid organs. FINDINGS:  LOWER THORAX: Small bilateral pleural effusions. Partial collapse bases  LIVER: No mass. BILIARY TREE: Gallstones. No evidence of acute cholecystitis CBD is not dilated. SPLEEN: within normal limits. PANCREAS: No focal abnormality. ADRENALS: 19 mm right adrenal nodule unchanged,  KIDNEYS/URETERS: Mild bilateral hydronephrosis. No stone  STOMACH: Unremarkable. SMALL BOWEL: No dilatation or wall thickening. COLON: No dilatation or wall thickening. APPENDIX: Surgically absent  PERITONEUM: No ascites or pneumoperitoneum. RETROPERITONEUM: No lymphadenopathy or aortic aneurysm.  Extensive vascular  calcifications  REPRODUCTIVE ORGANS: Mildly enlarged  URINARY BLADDER: Massive distention  BONES: No destructive bone lesion. ABDOMINAL WALL: Small umbilical hernia containing fat. ADDITIONAL COMMENTS: N/A    Impression  1. Bladder is massively distended with mild bilateral hydronephrosis. May  indicate bladder outlet obstruction. Prostate is mildly enlarged  2. Gallstones. No acute cholecystitis  3. Small bilateral pleural effusions    MRI Results (most recent):  Results from Hospital Encounter encounter on 05/22/16    MRI LUMB SPINE WO CONT    Narrative  **Final Report**      ICD Codes / Adm. Diagnosis: 724.2  M54.5 / Lumbago  Low back pain  Examination:  MR Jiménez Reason CON  - [id number] - May 22 2016  1:45PM  Accession No:  [id number]  Reason:  Low back pain      REPORT:  Indication: Pain and back extends into right leg to foot    Exam: MRI of the lumbar spine. Sequences include sagittal and axial T1 and  T2-weighted images. Sagittal STIR. Comparisons: April 27, 2016    Contrast: None    Findings: Alignment is normal. There is no evidence of marrow replacement or  acute fracture. Cord terminus is within normal limits. Paraspinous soft  tissues are within normal limits. T12-L1: No stenosis    L1-L2: There is desiccation of this disc with a small bulge but no stenosis    L2-L3: There is desiccation of this disc with a small bulge but no stenosis    L3-L4: There is mild left facet arthropathy but no stenosis    L4-L5: There is disc height loss with a small disc bulge. Facet arthropathy. No stenosis    L5-S1: There is disc height loss with a small disc bulge and left  paracentral disc extrusion extending inferiorly. This mildly distorts the  left S1 nerve root in the subarticular zone and left lateral recess.  There  are facet degenerative changes with moderate left and mild-to-moderate right  foraminal narrowing    Impression  :  1. Multilevel degenerative change detailed by level above most significant  at L5-S1                Signing/Reading Doctor: Linh Lopez ([id number])  Louis Coronado ([id number])  May 23 2016  8:39AM      No results for input(s): CPK, TROIQ in the last 72 hours. No lab exists for component: CKQMB, CPKMB, BMPP  Recent Labs     01/02/23  0351 01/01/23  0233    138   K 4.6 4.3    102   CO2 25 26   * 106*   CREA 1.93* 2.01*   * 172*   PHOS 4.8* 4.0   CA 10.3* 10.6*       Recent Labs     01/02/23  0351 01/01/23  0233   WBC 17.3* 20.8*   HGB 10.0* 10.2*   HCT 33.8* 34.4*    356       Recent Labs     01/02/23  0351 01/01/23  0233   * 156*         No results for input(s): CHOL, LDLC in the last 72 hours. No lab exists for component: TGL, HDLC,  HBA1C  No results for input(s): CRP, TSH, TSHEXT, TSHEXT in the last 72 hours.     No lab exists for component: ESR      Current meds:    Current Facility-Administered Medications:     [START ON 1/3/2023] insulin glargine (LANTUS) injection 48 Units, 48 Units, SubCUTAneous, DAILY, Sue Herrera MD    insulin lispro (HUMALOG) injection 5 Units, 5 Units, SubCUTAneous, TID WITH MEALS, Debbie Herrera MD, 5 Units at 01/02/23 1232    mirtazapine (REMERON) tablet 7.5 mg, 7.5 mg, Oral, QHS, Sue Herrera MD    albuterol-ipratropium (DUO-NEB) 2.5 MG-0.5 MG/3 ML, 3 mL, Nebulization, Q6HWA RT, Sue Herrera MD, 3 mL at 01/02/23 4469    clopidogreL (PLAVIX) tablet 75 mg, 75 mg, Oral, DAILY, Humberto Mccormick NP, 75 mg at 01/02/23 0825    bumetanide (BUMEX) injection 2 mg, 2 mg, IntraVENous, Q12H, Desire Dodd MD, 2 mg at 01/02/23 0824    potassium chloride SR (KLOR-CON 10) tablet 40 mEq, 40 mEq, Oral, BID, Lesly MOLINA MD, 40 mEq at 01/02/23 0825    pantoprazole (PROTONIX) tablet 40 mg, 40 mg, Oral, ACB, Tez Geronimo DO, 40 mg at 01/02/23 0709    hydrocortisone Sod Succ (PF) (SOLU-CORTEF) injection 50 mg, 50 mg, IntraVENous, Q12H, CCP Games L, NP, 50 mg at 01/02/23 0824    heparin (porcine) injection 5,000 Units, 5,000 Units, SubCUTAneous, Q12H, Lynsey Singh DO, 5,000 Units at 01/02/23 3016    QUEtiapine (SEROquel) tablet 50 mg, 50 mg, Oral, QHS, Lynsey Singh DO, 50 mg at 12/31/22 2113    lidocaine 4 % patch 1 Patch, 1 Patch, TransDERmal, Q24H, Tess MOLINA MD, 1 Patch at 12/31/22 1643    DOBUTamine (DOBUTREX) 500 mg/250 mL (2,000 mcg/mL) infusion, 3 mcg/kg/min, IntraVENous, CONTINUOUS, Saul Clark MD, Last Rate: 3.4 mL/hr at 01/02/23 1233, 2 mcg/kg/min at 01/02/23 1233    balsam peru-castor oiL (VENELEX) ointment, , Topical, BID, Candice Galicia MD, Given at 01/02/23 0845    alteplase (CATHFLO) 1 mg in sterile water (preservative free) 1 mL injection, 1 mg, InterCATHeter, PRN, Tess MOLINA MD    bacitracin 500 unit/gram packet 1 Packet, 1 Packet, Topical, PRN, Tess MOLINA MD    glucose chewable tablet 16 g, 4 Tablet, Oral, PRN, Tess MOLINA MD    glucagon (GLUCAGEN) injection 1 mg, 1 mg, IntraMUSCular, PRN, Aura Prather MD    dextrose 10 % infusion 0-250 mL, 0-250 mL, IntraVENous, PRN, Walker Lutz MD    insulin lispro (HUMALOG) injection, , SubCUTAneous, AC&HS, Lynsey Singh DO, 12 Units at 01/02/23 1231    senna-docusate (PERICOLACE) 8.6-50 mg per tablet 1 Tablet, 1 Tablet, Oral, BID PRN, Aura Prather MD, 1 Tablet at 12/26/22 8290    bisacodyL (DULCOLAX) suppository 10 mg, 10 mg, Rectal, QHS PRN, Tess MOLINA MD    sodium chloride (NS) flush 5-40 mL, 5-40 mL, IntraVENous, Q8H, Walker Aponte MD, 10 mL at 01/02/23 0710    sodium chloride (NS) flush 5-40 mL, 5-40 mL, IntraVENous, PRN, Aura Prather MD, 10 mL at 12/28/22 0845    acetaminophen (TYLENOL) tablet 650 mg, 650 mg, Oral, Q6H PRN, 650 mg at 12/26/22 1714 **OR** acetaminophen (TYLENOL) suppository 650 mg, 650 mg, Rectal, Q6H PRN, Aura Prather MD    polyethylene glycol (MIRALAX) packet 17 g, 17 g, Oral, DAILY PRN, Jose Manuel Tracy MD, 17 g at 12/26/22 0649    ondansetron (ZOFRAN ODT) tablet 4 mg, 4 mg, Oral, Q8H PRN **OR** ondansetron (ZOFRAN) injection 4 mg, 4 mg, IntraVENous, Q6H PRN, Tammy MOLINA MD, 4 mg at 12/24/22 0849    aspirin delayed-release tablet 81 mg, 81 mg, Oral, DAILY, Ranjit Pearson MD, 81 mg at 01/02/23 0825    ipratropium (ATROVENT) 21 mcg (0.03 %) nasal spray 2 Spray, 2 Spray, Both Nostrils, Q12H, Ranjit Pearson MD, 2 [place] at 01/01/23 2110    tamsulosin (FLOMAX) capsule 0.4 mg, 0.4 mg, Oral, DAILY, Ranjit Pearson MD, 0.4 mg at 01/02/23 0825    sodium chloride (NS) flush 5-40 mL, 5-40 mL, IntraVENous, PRN, MD Solis Walter MD  [facility] [address] [address]  [facility]  [phone]      Rodrigo Pro MD

## 2023-01-02 NOTE — PROGRESS NOTES
**Nursing staff will need to confirm with critical care transport [phone] on Tuesday morning for cardiac MRI scheduled at [facility] for 1/3/23 at 1330. SARA: Anticipate discharge to SNF pending medical progress. Transportation likely BLS. RUR: 25%     Disposition: Patient re-admitted 12/22 for sepsis. Patient with a history of cardiomyopathy, CAD s/p stenting, NSTEMI, diabetes II, HTN, CKD III. Currently on dobutamine drip which will affect discharge placement. CM reached out to [facility] inquiring as to plan for inotrope drip at discharge in order to proceed with SNF referrals. KAYLENE did speak with patient and son/DIL. Placement preferences are 1) [facility] in [place] [phone], 2) [facility] [place]  and 3)[facility]. [facility] not accept patient's on inotropes. KAYLENE informed family of this and stated that she will reach out to [place] facilities today. KAYLENE will continue to follow. 1400 CM left a message for health services admissions at [facility] in [place] [phone] requesting a return call. CM also contacted [facility] at [place] (, but was only able to leave a message. [facility]. 1440 CM received a return call from [facility] and they do not accept residents on inotrope drip.     Murali Linares, [address]  [phone]

## 2023-01-02 NOTE — CONSULTS
Nutrition Education    MD consult received for CHF diet education. Briefly discussed this with pt. No family present at time of visit but pt states his son will be coming in later today and will look over the information. Handout with RD contact info left in pt's room. Did discuss his intakes as they have been poor. He states he is eating very little still. Is drinking Ensure and milk and eating some of what his family brings in. Has also tried [employer] and likes that okay, states he is eating some of it. Did not offer up any other food preferences. Encouraged increased PO intakes. Educated on CHF diet for the undernourished. Learners: Patient  Readiness: Acceptance  Method: Explanation and Handout  Response: Verbalizes Understanding  Contact name and number provided.     Rere Miller RD  Contact via Sandata

## 2023-01-03 ENCOUNTER — HOSPITAL ENCOUNTER (OUTPATIENT)
Dept: MRI IMAGING | Age: 73
Discharge: HOME | End: 2023-01-03
Attending: NURSE PRACTITIONER
Payer: MEDICARE

## 2023-01-03 LAB
ALBUMIN SERPL ELPH-MCNC: 4.7 G/DL (ref 2.9–4.4)
ALBUMIN SERPL-MCNC: 3.7 G/DL (ref 3.5–5)
ALBUMIN/GLOB SERPL: 1 (ref 1.1–2.2)
ALBUMIN/GLOB SERPL: 1.7 (ref 0.7–1.7)
ALP SERPL-CCNC: 171 U/L (ref 45–117)
ALPHA1 GLOB SERPL ELPH-MCNC: 0.3 G/DL (ref 0–0.4)
ALPHA2 GLOB SERPL ELPH-MCNC: 0.9 G/DL (ref 0.4–1)
ALT SERPL-CCNC: 122 U/L (ref 12–78)
ANION GAP SERPL CALC-SCNC: 9 MMOL/L (ref 5–15)
AST SERPL-CCNC: 43 U/L (ref 15–37)
B-GLOBULIN SERPL ELPH-MCNC: 0.7 G/DL (ref 0.7–1.3)
BASOPHILS # BLD: 0.2 K/UL (ref 0–0.1)
BASOPHILS NFR BLD: 1 % (ref 0–1)
BILIRUB SERPL-MCNC: 0.7 MG/DL (ref 0.2–1)
BUN SERPL-MCNC: 101 MG/DL (ref 6–20)
BUN/CREAT SERPL: 61 (ref 12–20)
CALCIUM SERPL-MCNC: 10.2 MG/DL (ref 8.5–10.1)
CHLORIDE SERPL-SCNC: 98 MMOL/L (ref 97–108)
CO2 SERPL-SCNC: 27 MMOL/L (ref 21–32)
COMMENT, 096657: NORMAL
CREAT SERPL-MCNC: 1.65 MG/DL (ref 0.7–1.3)
DIFFERENTIAL METHOD BLD: ABNORMAL
EOSINOPHIL # BLD: 0 K/UL (ref 0–0.4)
EOSINOPHIL NFR BLD: 0 % (ref 0–7)
ERYTHROCYTE [DISTWIDTH] IN BLOOD BY AUTOMATED COUNT: 20.8 % (ref 11.5–14.5)
GAMMA GLOB SERPL ELPH-MCNC: 0.9 G/DL (ref 0.4–1.8)
GLOBULIN SER CALC-MCNC: 3.8 G/DL (ref 2–4)
GLOBULIN SER-MCNC: 2.8 G/DL (ref 2.2–3.9)
GLUCOSE BLD STRIP.AUTO-MCNC: 126 MG/DL (ref 65–117)
GLUCOSE BLD STRIP.AUTO-MCNC: 346 MG/DL (ref 65–117)
GLUCOSE BLD STRIP.AUTO-MCNC: 457 MG/DL (ref 65–117)
GLUCOSE SERPL-MCNC: 110 MG/DL (ref 65–100)
HCT VFR BLD AUTO: 33.3 % (ref 36.6–50.3)
HGB BLD-MCNC: 10.2 G/DL (ref 12.1–17)
IGA SERPL-MCNC: 161 MG/DL (ref 61–437)
IGG SERPL-MCNC: 872 MG/DL (ref 603–1613)
IGM SERPL-MCNC: 78 MG/DL (ref 15–143)
IMM GRANULOCYTES # BLD AUTO: 0.2 K/UL (ref 0–0.04)
IMM GRANULOCYTES NFR BLD AUTO: 1 % (ref 0–0.5)
INTERPRETATION SERPL IEP-IMP: ABNORMAL
KAPPA LC FREE SER-MCNC: 73 MG/L (ref 3.3–19.4)
KAPPA LC FREE SER-MCNC: 94.2 MG/L (ref 3.3–19.4)
KAPPA LC FREE/LAMBDA FREE SER: 2.11 (ref 0.26–1.65)
KAPPA LC FREE/LAMBDA FREE SER: 2.22 (ref 0.26–1.65)
LAMBDA LC FREE SERPL-MCNC: 34.6 MG/L (ref 5.7–26.3)
LAMBDA LC FREE SERPL-MCNC: 42.4 MG/L (ref 5.7–26.3)
LYMPHOCYTES # BLD: 0.8 K/UL (ref 0.8–3.5)
LYMPHOCYTES NFR BLD: 5 % (ref 12–49)
M PROTEIN SERPL ELPH-MCNC: ABNORMAL G/DL
MAGNESIUM SERPL-MCNC: 2.7 MG/DL (ref 1.6–2.4)
MCH RBC QN AUTO: 23.2 PG (ref 26–34)
MCHC RBC AUTO-ENTMCNC: 30.6 G/DL (ref 30–36.5)
MCV RBC AUTO: 75.9 FL (ref 80–99)
MONOCYTES # BLD: 0.5 K/UL (ref 0–1)
MONOCYTES NFR BLD: 3 % (ref 5–13)
NEUTS SEG # BLD: 13.6 K/UL (ref 1.8–8)
NEUTS SEG NFR BLD: 90 % (ref 32–75)
NRBC # BLD: 0 K/UL (ref 0–0.01)
NRBC BLD-RTO: 0 PER 100 WBC
PHOSPHATE SERPL-MCNC: 4.8 MG/DL (ref 2.6–4.7)
PLATELET # BLD AUTO: 282 K/UL (ref 150–400)
PLATELET COMMENTS,PCOM: ABNORMAL
PMV BLD AUTO: 10.5 FL (ref 8.9–12.9)
POTASSIUM SERPL-SCNC: 4.4 MMOL/L (ref 3.5–5.1)
PROCALCITONIN SERPL-MCNC: 0.5 NG/ML
PROT SERPL-MCNC: 7.5 G/DL (ref 6.4–8.2)
PROT SERPL-MCNC: 7.5 G/DL (ref 6–8.5)
RBC # BLD AUTO: 4.39 M/UL (ref 4.1–5.7)
RBC MORPH BLD: ABNORMAL
SERVICE CMNT-IMP: ABNORMAL
SODIUM SERPL-SCNC: 134 MMOL/L (ref 136–145)
T GONDII IGM SER IA-ACNC: <3 AU/ML (ref 0–7.9)
WBC # BLD AUTO: 15.3 K/UL (ref 4.1–11.1)

## 2023-01-03 PROCEDURE — 74011250636 HC RX REV CODE- 250/636: Performed by: INTERNAL MEDICINE

## 2023-01-03 PROCEDURE — 75561 CARDIAC MRI FOR MORPH W/DYE: CPT | Performed by: INTERNAL MEDICINE

## 2023-01-03 PROCEDURE — 83735 ASSAY OF MAGNESIUM: CPT

## 2023-01-03 PROCEDURE — 84145 PROCALCITONIN (PCT): CPT

## 2023-01-03 PROCEDURE — 74011250636 HC RX REV CODE- 250/636: Performed by: STUDENT IN AN ORGANIZED HEALTH CARE EDUCATION/TRAINING PROGRAM

## 2023-01-03 PROCEDURE — 74011250637 HC RX REV CODE- 250/637: Performed by: NURSE PRACTITIONER

## 2023-01-03 PROCEDURE — 36415 COLL VENOUS BLD VENIPUNCTURE: CPT

## 2023-01-03 PROCEDURE — 99233 SBSQ HOSP IP/OBS HIGH 50: CPT | Performed by: INTERNAL MEDICINE

## 2023-01-03 PROCEDURE — 74011250637 HC RX REV CODE- 250/637: Performed by: INTERNAL MEDICINE

## 2023-01-03 PROCEDURE — 84100 ASSAY OF PHOSPHORUS: CPT

## 2023-01-03 PROCEDURE — 74011636637 HC RX REV CODE- 636/637

## 2023-01-03 PROCEDURE — 74011636637 HC RX REV CODE- 636/637: Performed by: INTERNAL MEDICINE

## 2023-01-03 PROCEDURE — 74011250637 HC RX REV CODE- 250/637: Performed by: STUDENT IN AN ORGANIZED HEALTH CARE EDUCATION/TRAINING PROGRAM

## 2023-01-03 PROCEDURE — 94640 AIRWAY INHALATION TREATMENT: CPT

## 2023-01-03 PROCEDURE — 74011000250 HC RX REV CODE- 250: Performed by: INTERNAL MEDICINE

## 2023-01-03 PROCEDURE — 74011000250 HC RX REV CODE- 250: Performed by: STUDENT IN AN ORGANIZED HEALTH CARE EDUCATION/TRAINING PROGRAM

## 2023-01-03 PROCEDURE — 65660000001 HC RM ICU INTERMED STEPDOWN

## 2023-01-03 PROCEDURE — 74011250637 HC RX REV CODE- 250/637: Performed by: FAMILY MEDICINE

## 2023-01-03 PROCEDURE — 74011250636 HC RX REV CODE- 250/636: Performed by: NURSE PRACTITIONER

## 2023-01-03 PROCEDURE — 80053 COMPREHEN METABOLIC PANEL: CPT

## 2023-01-03 PROCEDURE — 82962 GLUCOSE BLOOD TEST: CPT

## 2023-01-03 PROCEDURE — A9576 INJ PROHANCE MULTIPACK: HCPCS | Performed by: INTERNAL MEDICINE

## 2023-01-03 PROCEDURE — 85025 COMPLETE CBC W/AUTO DIFF WBC: CPT

## 2023-01-03 PROCEDURE — 74011636637 HC RX REV CODE- 636/637: Performed by: STUDENT IN AN ORGANIZED HEALTH CARE EDUCATION/TRAINING PROGRAM

## 2023-01-03 RX ORDER — DIGOXIN 125 MCG
0.12 TABLET ORAL DAILY
Status: DISCONTINUED | OUTPATIENT
Start: 2023-01-03 | End: 2023-01-15

## 2023-01-03 RX ORDER — DOBUTAMINE HYDROCHLORIDE 200 MG/100ML
5 INJECTION INTRAVENOUS CONTINUOUS
Status: DISCONTINUED | OUTPATIENT
Start: 2023-01-03 | End: 2023-01-04

## 2023-01-03 RX ORDER — INSULIN LISPRO 100 [IU]/ML
4 INJECTION, SOLUTION INTRAVENOUS; SUBCUTANEOUS ONCE
Status: COMPLETED | OUTPATIENT
Start: 2023-01-03 | End: 2023-01-03

## 2023-01-03 RX ADMIN — Medication: at 08:38

## 2023-01-03 RX ADMIN — HEPARIN SODIUM 5000 UNITS: 5000 INJECTION INTRAVENOUS; SUBCUTANEOUS at 08:37

## 2023-01-03 RX ADMIN — PANTOPRAZOLE SODIUM 40 MG: 40 TABLET, DELAYED RELEASE ORAL at 08:38

## 2023-01-03 RX ADMIN — Medication 4 UNITS: at 21:39

## 2023-01-03 RX ADMIN — MIRTAZAPINE 7.5 MG: 15 TABLET, FILM COATED ORAL at 21:39

## 2023-01-03 RX ADMIN — Medication: at 17:22

## 2023-01-03 RX ADMIN — INSULIN GLARGINE 48 UNITS: 100 INJECTION, SOLUTION SUBCUTANEOUS at 08:38

## 2023-01-03 RX ADMIN — IPRATROPIUM BROMIDE AND ALBUTEROL SULFATE 3 ML: .5; 3 SOLUTION RESPIRATORY (INHALATION) at 15:20

## 2023-01-03 RX ADMIN — IPRATROPIUM BROMIDE AND ALBUTEROL SULFATE 3 ML: .5; 3 SOLUTION RESPIRATORY (INHALATION) at 07:58

## 2023-01-03 RX ADMIN — SODIUM CHLORIDE, PRESERVATIVE FREE 10 ML: 5 INJECTION INTRAVENOUS at 21:41

## 2023-01-03 RX ADMIN — CLOPIDOGREL BISULFATE 75 MG: 75 TABLET ORAL at 08:38

## 2023-01-03 RX ADMIN — IPRATROPIUM BROMIDE AND ALBUTEROL SULFATE 3 ML: .5; 3 SOLUTION RESPIRATORY (INHALATION) at 22:11

## 2023-01-03 RX ADMIN — Medication 9 UNITS: at 17:22

## 2023-01-03 RX ADMIN — DOBUTAMINE HYDROCHLORIDE 2 MCG/KG/MIN: 200 INJECTION INTRAVENOUS at 06:07

## 2023-01-03 RX ADMIN — DIGOXIN 0.12 MG: 125 TABLET ORAL at 10:35

## 2023-01-03 RX ADMIN — HYDROCORTISONE SODIUM SUCCINATE 50 MG: 100 INJECTION, POWDER, FOR SOLUTION INTRAMUSCULAR; INTRAVENOUS at 21:39

## 2023-01-03 RX ADMIN — Medication 5 UNITS: at 17:22

## 2023-01-03 RX ADMIN — ASPIRIN 81 MG: 81 TABLET, COATED ORAL at 08:38

## 2023-01-03 RX ADMIN — Medication 9 UNITS: at 21:39

## 2023-01-03 RX ADMIN — BUMETANIDE 2 MG: 0.25 INJECTION, SOLUTION INTRAMUSCULAR; INTRAVENOUS at 08:37

## 2023-01-03 RX ADMIN — IPRATROPIUM BROMIDE 2 SPRAY: 21 SPRAY, METERED NASAL at 08:38

## 2023-01-03 RX ADMIN — SODIUM CHLORIDE, PRESERVATIVE FREE 10 ML: 5 INJECTION INTRAVENOUS at 17:23

## 2023-01-03 RX ADMIN — HYDROCORTISONE SODIUM SUCCINATE 50 MG: 100 INJECTION, POWDER, FOR SOLUTION INTRAMUSCULAR; INTRAVENOUS at 08:37

## 2023-01-03 RX ADMIN — HEPARIN SODIUM 5000 UNITS: 5000 INJECTION INTRAVENOUS; SUBCUTANEOUS at 21:39

## 2023-01-03 RX ADMIN — POTASSIUM CHLORIDE 40 MEQ: 750 TABLET, FILM COATED, EXTENDED RELEASE ORAL at 08:38

## 2023-01-03 RX ADMIN — GADOTERIDOL 15 ML: 279.3 INJECTION, SOLUTION INTRAVENOUS at 14:25

## 2023-01-03 RX ADMIN — QUETIAPINE FUMARATE 50 MG: 25 TABLET ORAL at 21:39

## 2023-01-03 RX ADMIN — TAMSULOSIN HYDROCHLORIDE 0.4 MG: 0.4 CAPSULE ORAL at 08:38

## 2023-01-03 RX ADMIN — IPRATROPIUM BROMIDE 2 SPRAY: 21 SPRAY, METERED NASAL at 21:39

## 2023-01-03 NOTE — PROGRESS NOTES
0730 Bedside and Verbal shift change report given to Kaylan Connor (oncoming nurse) by Melissa Randhawa (offgoing nurse). Report included the following information SBAR, Kardex, Procedure Summary, Intake/Output, MAR, Accordion, and Recent Results. 1200 Pt going to [address] for cardiac MRI with RN and [facility]. 1513 Pt back on unit. 901 [address] with Dr. Jem Kaiser regarding Pt's BP, will increase Dobutamine gtt to 5.    1930 Bedside and Verbal shift change report given to Melissa Randhawa (oncoming nurse) by Kaylan Connor (offgoing nurse). Report included the following information SBAR, Kardex, Procedure Summary, Intake/Output, MAR, Accordion, and Recent Results.

## 2023-01-03 NOTE — PROGRESS NOTES
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, LVEF 25-30%, stage C, NYHA class IIIA, unable to up-titrate GDMT for heart failure due to hypotension on dobutamine gtt  Most likely etiology of cardiomyopathy: CAD +/- TRISTAN +/- inappropriate sinus tach +/- undergoing workup for cardiac amyloidosis or other etiologies with cardiac MRI today (PYP equivocal, gammopathy and genetics pending)  In order to determine prognosis and timing of consideration for LVAD-DT evaluation; patient will need RHC off inotropes when clinically euvolemic and cannot walk > 200 meters; will work on inotrope wean this week  Note: currently patient does not meet standard criteria for LVAD evaluation due to LVEF > 25%    RECOMMENDATIONS:  For now increase dobutamine to 4 mcg/kg/min due to hypotension and re-dose to current weight 49kg  Hypotension likely due to aggressive diuresis, appears clinically dry; hold diuretics today  Check NICOM today and wean dobutamine to NICOM as BP improves over next days   Start digoxin 0.125mcg daily, check digoxin level daily  Steroids per primary team  Uric acid level 6.8  Cannot tolerate beta-blockers due to dobutamine  Cannot tolerate ACEi/ARB/ARNi/MRA due to acute renal failure  Cannot tolerate SGLT2i due to borderline eGFR  Continue baby aspirin and plavix  Cardiac MRI to r/o cardiac amyloidosis or other etiologies  DNR     IMPRESSION:  Acute on chronic combined systolic/diastolic  heart failure  Stage D, NYHA class IV symptoms  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, LVEF 25-30%  Undergoing evaluation for cardiac amyloidosis with cMRI  Coronary artery disease  CAD s/p CABG x 2: further disease best managed medically due to small vessel size   Peripheral arterial disease  Bilateral hydronephrosis s/p andrzej  Cardiac risk factors:  HTN  HL  TRISTAN  DM2  CKD, stage 3      INTERVAL EVENTS:  Hypotensive on dobutamine 2  Afebrile  SBP 80/50s, HR 90s  I/O net negative 1.9 liters, urine 2.7 liters    LIFE GOALS:  Lifestyle goals reviewed with the patient. Patient's personal goals include: TBD  Important upcoming milestones or family events: TBD  The patient identifies the following as important for living well: TBD      HPI:  71 y.o. male who was admitted via ED for worsening SOB, poor appetite, orthopnea and fatigue. Pmhx of CAD s/p CABG, PAD, DM 2, recent admission for HFmrEF earlier this month. Serial TTEs show progressive decline in EF since 3/2021 with the most recent EF at 25-30%. Recent LHC shows diffuse CAD and no interventions indicated. Patient had [place] recently and there is some thought to myocarditis or other etiology causing worsening HF. [facility] was consulted for further evaluation and management of HFrEF. CARDIAC IMAGING:  Echo 12/26/22    Left Ventricle: Severely reduced left ventricular systolic function with a visually estimated EF of 25 - 30%. Left ventricle size is normal. Normal wall thickness. There are regional wall motion abnormalities. Grade II diastolic dysfunction with increased LAP. Right Ventricle: Moderately reduced systolic function. TAPSE is abnormal. TAPSE is 1.1 cm. Aortic Valve: Mild stenosis of the aortic valve. AV peak gradient is 13 mmHg. AV peak velocity is 1.8 m/s. Mitral Valve: Not well visualized. Moderate annular calcification at the posterior leaflet of the mitral valve. Mild to moderate regurgitation. Tricuspid Valve: Mildly elevated RVSP. Left Atrium: Left atrium is moderately dilated. 12/8/22    Left Ventricle:  Moderately reduced left ventricular systolic function with a visually estimated EF of 35 - 40%. Severe hypokinesis of the following segments: mid anteroseptal, apical anterior, apical septal, apical inferior and apical lateral. Severe hypokinesis of the apex. Mitral Valve: Severely thickened leaflet, at the anterior and posterior leaflets. Severely calcified leaflet, at the anterior and posterior leaflets. Mild annular calcification of the mitral valve. Moderate regurgitation. Left Atrium: Left atrium is mildly dilated. Contrast used: Definity. limited study     EKG 12/22/22 ST, Biatria enlargement, marked ST abnormality     LHC 12/6/22  1. Normal LVEDP  2. Severe native multivessel coronary artery disease  3. Patent LIMA to LAD and vein graft to distal RCA  4. Recurrent ISR in OM1 stent with now 60 to 70% restenosis  5. Recoil of left main and circumflex stent with now recurrent 40 to 50% stenosis. 6.  Progression of ostial left main disease now to about 60% stenosis  7. Progression of disease in jailed first marginal branch now with diffuse 90% stenosis  8. High-grade stenosis in the mid to distal right potential femoral artery treated with 6 x 40 mm impact drug-coated balloon angioplasty to reduce the stenosis to less than 40%     NST        HEMODYNAMICS:  RHC not done  CPEST too ill   6MW too ill       PHYSICAL EXAM:  Visit Vitals  BP (!) 78/59   Pulse 94   Temp 98 °F (36.7 °C)   Resp 18   Ht 5' 9\" (1.753 m)   Wt 108 lb 3.9 oz (49.1 kg)   SpO2 100%   BMI 15.99 kg/m²     General: Patient is cachectic in no acute distress  HEENT: Unremarkable   Neck: Supple. No evidence of thyroid enlargements or lymphadenopathy. JVD: Cannot be appreciated   Lungs: Breath sounds are equal and clear bilaterally. No wheezes, rhonchi, or rales. Heart: Regular rate and rhythm with normal S1 and S2. No murmurs, gallops or rubs. Abdomen: Soft, no mass or tenderness. No organomegaly or hernia. Bowel sounds present.   Genitourinary and rectal: deferred  Extremities: No cyanosis, clubbing, or edema. Neurologic: No focal sensory or motor deficits are noted. Grossly intact. Psychiatric: Awake, alert an doriented x 3. Appropriate mood and affect. Skin: Warm, dry and well perfused. REVIEW OF SYSTEMS:  General: Denies fever. Ear, nose and throat: Denies difficulty hearing, sinus problems, nosebleeds  Cardiovascular: see above in the interval history  Respiratory: Denies cough, wheezing, sputum production, hemoptysis. Gastrointestinal: Denies heartburn, constipation, diarrhea, abdominal pain, nausea, blood in stool  Kidney and bladder: Denies painful urination, frequent urination  Musculoskeletal: Denies joint pain, muscle weakness  Skin and hair: Denies change in existing skin lesions    PAST MEDICAL HISTORY:  Past Medical History:   Diagnosis Date    CAD (coronary artery disease) 11/10/2016    NSTEMI & 2 stents    Deafness 10/28/2012    DM (diabetes mellitus) ([facility] [address]     Elevated cholesterol     Hypertension     NSTEMI (non-ST elevated myocardial infarction) ([facility] [address] 11/10/2016       PAST SURGICAL HISTORY:  Past Surgical History:   Procedure Laterality Date    COLONOSCOPY N/A 6/28/2018    COLONOSCOPY performed by Kaela Burton MD at [facility] ENDOSCOPY    [address]  11/11/2016    2 stents       FAMILY HISTORY:  Family History   Problem Relation Age of Onset    Heart Disease Father     Heart Attack Father     Hypertension Mother     Elevated Lipids Brother     Elevated Lipids Brother     No Known Problems Sister     Elevated Lipids Brother     No Known Problems Son     No Known Problems Daughter     Anesth Problems Neg Hx        SOCIAL HISTORY:  Social History     Socioeconomic History    Marital status:    Tobacco Use    Smoking status: Never     Passive exposure: Never    Smokeless tobacco: Never   Vaping Use    Vaping Use: Never used   Substance and Sexual Activity    Alcohol use:  Yes     Alcohol/week: 2.0 standard drinks     Types: 1 Cans of beer, 1 Shots of liquor per week     Comment: rarely    Drug use: No    Sexual activity: Yes     Social Determinants of Health     Financial Resource Strain: Medium Risk    Difficulty of Paying Living Expenses: Somewhat hard   Food Insecurity: Food Insecurity Present    Worried About [address] in the Last Year: Never true    Ran Out of Food in the Last Year: Often true       LABORATORY RESULTS:     Labs Latest Ref Rng & Units 1/3/2023 1/2/2023 1/1/2023 12/31/2022 12/31/2022 12/30/2022 12/29/2022   WBC 4.1 - 11.1 K/uL 15. 3(H) 17. 3(H) 20. 8(H) 13. 6(H) - 11.0 -   RBC 4.10 - 5.70 M/uL 4.39 4.37 4.51 4.29 - 4.08(L) -   Hemoglobin 12.1 - 17.0 g/dL 10. 2(L) 10. 0(L) 10. 2(L) 9.7(L) - 9. 2(L) -   Hematocrit 36.6 - 50.3 % 33. 3(L) 33. 8(L) 34. 4(L) 33. 0(L) - 30. 7(L) -   MCV 80.0 - 99.0 FL 75. 9(L) 77. 3(L) 76. 3(L) 76. 9(L) - 75. 2(L) -   Platelets 548 - 664 K/uL 282 305 356 330 - 325 -   Lymphocytes 12 - 49 % 5(L) 4(L) - 3(L) - 4(L) -   Monocytes 5 - 13 % 3(L) 3(L) - 5 - 4(L) -   Eosinophils 0 - 7 % 0 0 - 0 - 0 -   Basophils 0 - 1 % 1 0 - 0 - 0 -   Albumin 3.5 - 5.0 g/dL 3.7 4.0 4.3 4.4 - 4.4 4.4   Calcium 8.5 - 10.1 MG/DL 10. 2(H) 10. 3(H) 10. 6(H) 10. 5(H) 10. 7(H) 10. 5(H) 10. 7(H)   Glucose 65 - 100 mg/dL 110(H) 256(H) 172(H) 315(H) - 186(H) 124(H)   BUN 6 - 20 MG/(H) 105(H) 106(H) 111(H) - 94(H) 96(H)   Creatinine 0.70 - 1.30 MG/DL 1.65(H) 1.93(H) 2.01(H) 2.30(H) - 2.47(H) 2.66(H)   Sodium 136 - 145 mmol/L 134(L) 137 138 133(L) - 134(L) 134(L)   Potassium 3.5 - 5.1 mmol/L 4.4 4.6 4.3 3.6 - 3.4(L) 3.6   TSH 0.36 - 3.74 uIU/mL - - - - - - -   PSA 0.01 - 4.0 ng/mL - - - - - - -   Some recent data might be hidden     Lab Results   Component Value Date/Time    TSH 2.12 12/27/2022 02:36 PM    TSH 4.80 (H) 12/06/2022 03:53 AM    TSH 5.39 (H) 10/12/2022 09:10 AM    TSH 3.53 02/03/2022 11:47 AM    TSH 5.790 (H) 11/21/2019 04:45 PM    TSH 3.08 06/22/2018 01:53 PM    TSH 4.250 05/26/2015 09:43 AM       ALLERGY:  No Known Allergies     CURRENT MEDICATIONS:    Current Facility-Administered Medications:     digoxin (LANOXIN) tablet 0.125 mg, 0.125 mg, Oral, DAILY, Colleen Astorga NP, 0.125 mg at 01/03/23 1035    insulin glargine (LANTUS) injection 48 Units, 48 Units, SubCUTAneous, DAILY, Elsy Hays MD, 48 Units at 01/03/23 0838    insulin lispro (HUMALOG) injection 5 Units, 5 Units, SubCUTAneous, TID WITH MEALS, Elsy Hays MD, 5 Units at 01/02/23 1232    mirtazapine (REMERON) tablet 7.5 mg, 7.5 mg, Oral, QHS, Migdalia Hays MD, 7.5 mg at 01/02/23 2141    albuterol-ipratropium (DUO-NEB) 2.5 MG-0.5 MG/3 ML, 3 mL, Nebulization, Q6HWA RT, Migdalia Hays MD, 3 mL at 01/03/23 0758    clopidogreL (PLAVIX) tablet 75 mg, 75 mg, Oral, DAILY, Nicki Mccormick NP, 75 mg at 01/03/23 0838    bumetanide (BUMEX) injection 2 mg, 2 mg, IntraVENous, Q12H, Desire Dodd MD, 2 mg at 01/03/23 0837    potassium chloride SR (KLOR-CON 10) tablet 40 mEq, 40 mEq, Oral, BID, Lilian MOLINA MD, 40 mEq at 01/03/23 0838    pantoprazole (PROTONIX) tablet 40 mg, 40 mg, Oral, ACB, Jerrol Floor, DO, 40 mg at 01/03/23 8486    hydrocortisone Sod Succ (PF) (SOLU-CORTEF) injection 50 mg, 50 mg, IntraVENous, Q12H, Colleen Astorga NP, 50 mg at 01/03/23 0837    heparin (porcine) injection 5,000 Units, 5,000 Units, SubCUTAneous, Q12H, Jerrol Floor, DO, 5,000 Units at 01/03/23 1159    QUEtiapine (SEROquel) tablet 50 mg, 50 mg, Oral, QHS, David Aguilar, , 50 mg at 01/02/23 2140    lidocaine 4 % patch 1 Patch, 1 Patch, TransDERmal, Q24H, Walker Aponte MD, 1 Patch at 12/31/22 1643    DOBUTamine (DOBUTREX) 500 mg/250 mL (2,000 mcg/mL) infusion, 3 mcg/kg/min, IntraVENous, CONTINUOUS, Fausto Sofia MD, Last Rate: 3.4 mL/hr at 01/03/23 0607, 2 mcg/kg/min at 01/03/23 1070    balsam peru-castor oiL (VENELEX) ointment, , Topical, BID, João Chan MD, Given at 01/03/23 5768    alteplase (CATHFLO) 1 mg in sterile water (preservative free) 1 mL injection, 1 mg, InterCATHeter, PRN, Rebeca MOLINA MD    bacitracin 500 unit/gram packet 1 Packet, 1 Packet, Topical, PRN, Rebeca MOLINA MD    glucose chewable tablet 16 g, 4 Tablet, Oral, PRN, Rebeca MOLINA MD    glucagon (GLUCAGEN) injection 1 mg, 1 mg, IntraMUSCular, PRN, Leonard Michelle MD    dextrose 10 % infusion 0-250 mL, 0-250 mL, IntraVENous, PRN, Rebeca MOLINA MD    insulin lispro (HUMALOG) injection, , SubCUTAneous, AC&HS, [facility] Center[facility] lauren, 6 Units at 01/02/23 2141    senna-docusate (Skippy Spell) 8.6-50 mg per tablet 1 Tablet, 1 Tablet, Oral, BID PRN, Leonard Michelle MD, 1 Tablet at 12/26/22 2222    bisacodyL (DULCOLAX) suppository 10 mg, 10 mg, Rectal, QHS PRN, Rebeca MOLINA MD    sodium chloride (NS) flush 5-40 mL, 5-40 mL, IntraVENous, Q8H, Walker Aponte MD, 10 mL at 01/02/23 2141    sodium chloride (NS) flush 5-40 mL, 5-40 mL, IntraVENous, PRN, Leonard Michelle MD, 10 mL at 12/28/22 0845    acetaminophen (TYLENOL) tablet 650 mg, 650 mg, Oral, Q6H PRN, 650 mg at 12/26/22 1714 **OR** acetaminophen (TYLENOL) suppository 650 mg, 650 mg, Rectal, Q6H PRN, Leonard Michelle MD    polyethylene glycol (MIRALAX) packet 17 g, 17 g, Oral, DAILY PRN, Leonard Michelle MD, 17 g at 12/26/22 0649    ondansetron (ZOFRAN ODT) tablet 4 mg, 4 mg, Oral, Q8H PRN **OR** ondansetron (ZOFRAN) injection 4 mg, 4 mg, IntraVENous, Q6H PRN, Rebeca MOLINA MD, 4 mg at 12/24/22 0849    aspirin delayed-release tablet 81 mg, 81 mg, Oral, DAILY, Meghann Grant MD, 81 mg at 01/03/23 0838    ipratropium (ATROVENT) 21 mcg (0.03 %) nasal spray 2 Spray, 2 Spray, Both Nostrils, Q12H, Michel, Sergio Nation MD, 2 [place] at 01/03/23 0838    tamsulosin (FLOMAX) capsule 0.4 mg, 0.4 mg, Oral, DAILY, Michel, Rnajit SALMERON MD, 0.4 mg at 01/03/23 0838    sodium chloride (NS) flush 5-40 mL, 5-40 mL, IntraVENous, PRN, Riaz Ken MD    PATIENT CARE TEAM:  Patient Care Team:  Lucretia Buckner MD as PCP - General (Family Medicine)  Lucretia Buckner MD as PCP - [facility] Provider  Elaine Loo MD (Cardiovascular Disease Physician)  Damion Layne MD (Gastroenterology)  Devaughn Lewis MD (Cardiothoracic Surgery)  Susanna Primrose, MD (Cardiovascular Disease Physician)  Viji Easton MD (Nephrology)  Maria D Negron RN as Care Transitions Nurse  Saulo Corea MD (Pulmonary Disease)     Thank you for allowing me to participate in this patient's care.     Juju Valdes MD   [address] [place] [address], [address]  Phone: [phone]

## 2023-01-03 NOTE — PROGRESS NOTES
Physician Progress Note      Magnolia Connor  CSN #:                  [id number]  :                       1950  ADMIT DATE:       2022 11:23 AM  [address] [place] DATE:  RESPONDING  PROVIDER #:        Alec Isbell MD          QUERY TEXT:    Happy New Year!!    Patient admitted with sepsis. Per wound care nurse, noted to also have pressure ulcer. If possible, please document in progress notes and discharge summary the location, present on admission status and stage of the pressure ulcer: The medical record reflects the following:  Risk Factors: malnutrition; prolonged bed rest; decreased bed mobility    Clinical Indicators:    Elfredia Jose -  POA right heel deep tissue injury  3 x 3 x 0 cm  100% non-blanching purple; no blistering or skin sliding or induration or fluctuance    Treatment: Venelex; offload heels    Stage 1:  Non-blanchable erythema of intact skin  Stage 2:  Abrasion, Blister, Partial-thickness skin loss, with exposed dermis  Stage 3:  Full-thickness skin loss with damage or necrosis of subcutaneous tissue  Stage 4:  Full-thickness skin & soft tissue loss through to underlying muscle, tendon or bone  Unstageable: Obscured full-thickness skin & tissue loss    Thank you,  Alyce Vick RN, BSN, CRCR, CCDS, SMART  Clinical Documentation Improvement  Amy Mccall. [email or web address]  [phone] or via [employer]  Options provided:  -- Deep tissue injury pressure ulcer of R heel present on admission  -- Deep tissue injury pressure ulcer of R heel not present on admission  -- Other - I will add my own diagnosis  -- Disagree - Not applicable / Not valid  -- Disagree - Clinically unable to determine / Unknown  -- Refer to Clinical Documentation Reviewer    PROVIDER RESPONSE TEXT:    This patient has a deep tissue injury pressure ulcer of the R heel which was present on admission.     Query created by: Perfecto Appiah on 1/3/2023 11:30 AM      Electronically signed by:   Terence Guerrero MD 1/3/2023 4:43 PM

## 2023-01-03 NOTE — PROGRESS NOTES
Physical therapy:    Chart reviewed and PT session attempted. Patient off the floor and at OSH for cardiac MRI. Will defer and continue to follow. Thank you.     Tasha Members, PT, DPT

## 2023-01-03 NOTE — PROGRESS NOTES
[facility] CARDIOLOGY   Care Note                  []Initial visit     [x]Established visit     Patient Name: Emiliano Valadez - HXU:[id number] - NXZ:[id number]  Primary Cardiologist: Terrence Patiño MD  Consulting Cardiologist: Magaly Bhatia md     Reason for initial visit:  dyspnea, decompensated HF, tachycardia. HPI:     CAD with CABG 6/9/2018. NSTEMI in November 2016 and resolved pulmonary HTN. Stent to circumflex an LAD in 2016. Stress echo in 2018 with a drop in BP with exercise and a drop in EF. Cath on 6/22/18 that showed even with a 5F catheter, he dampened with suspected ostial 3 vessel disease. Echo 3/27/2021 = EF 55 to 60% mild AR MR TR LAE MAC  Nuclear 3/18/2021 EF 64% medium size defect apical and inferior lateral reversible ischemia medium defect mid and inferolateral nonreversible appear to be infarction intermediate risk stress test .  PCI 02/28/2022--patent LIMA to LAD, vein graft to distal RCA with severe disease in the native vessels Occluded vein graft to OM 2. Native OM 2 severely diseased along with proximal to mid circumflex as well as distal left main. Overall the vessel is significantly remodeled negatively. Additionally there is significant disease in the first marginal branch which is even smaller as well as AV groove branch right at the takeoff of OM2. Single stent 2.25 x 28 mm Xience was placed extending from the OM 2 into the circumflex into the distal left main and sequentially dilated with 2.25 noncompliant, 2 5 noncompliant, 3 oh noncompliant and 3 5 noncompliant to perform multilevel POT to manage multiple bifurcations on the way. Atherectomy was considered but given small size of the vessels, was not deemed to be absolutely safe for the obtuse marginal lesion. Overall stent expanded fairly well related to the size of the vessels.  Normal LVEDP  Nuclear stress October 27, 2022 test showed ischemia similar to 3/15/2021 with a medium size defect in the mid to distal inferolateral and anterolateral segments in the circumflex territory he had septal dyskinesia with an EF of 39%. He had a fixed apical infarct  Echocardiogram October 27, 2022 showed an EF of 45 to 50% TAPSE at 1.2 showing reduced RV function sclerosis of the aortic valve with stenosis that was very mild with a valve area of mean of 7 mmHg and HUY of 1.4 cm². The mitral was thickened with restricted mobility and mild MR.      SUBJECTIVE:    Remains on low dose dobutamine and bumex gtt, negative 1900ml today  Creatinine significantly improved and is now back to baseline. Denies chest pain, feels a little heart racing from time to time, no arrhythmias noted on telemetry  Reports improvement in dyspnea, no LE edema  Denies lightheadedness or dizziness  Plan to perform cardiac MRI today after being transferred [facility]. Blood pressure is marginal and may require increasing dose of dobutamine     Assessment and Plan       1. Dyspnea/decompensated HFrEF- EF 20%. Yesterday dobutamine was reduced down to 2 mcg/kg/min + IV bumex gtt. Weight stable. Creatinine stable, LFTs slightly decreased. Rapid deterioration in LV function with overall WMA on recent echo appears not to be in teritorry of ischemia suggesting alternative mechanism for  cardiomyopathy (stress SMP vs myocarditis). Blood pressures are marginal and at this point may need larger dose of dobutamine and may be dobutamine is dependent for foreseeable few weeks. 2  CAD - sp CABG in 2018, cath 9/8/2021 open LIMA, PCI to OM 2 all the way up to the left main 2/28/2022. Cath 12/6/2022 open LIMA and RCA graft some in-stent restenosis in the long graft from the left main to OM 2 but not severe and the OM1 was more severely diseased in the small vessel.   Interventional cardiology felt no reasonable vessel to intervene and recommended medical management. The OM disease is consistent with a lateral wall ischemia demonstrated on prior nuclear stress test.  Continue aspirin, plavix. Holding statin for elevated LFTs. Continue to monitor CBC for anemia. Hgb up to 9.7 currently. Microcytic indices. 3.  Aortic stenosis mild mean gradient of 7 nn Hg, HUY 1.4 by echo 11/27/2022 -mild , murmur     4. Diabetes mellitus type 2- On insulin, management per IM     5. PAD: S/P Right SFA PTCA 12/6/22. Continue ASA, plavix. Holding statin for elevated LFTs. Needs follow up with Dr Doug Eller in February with MARIANELA/Dopplers    6. 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 DNR status in place          ____________________________________________________________    Cardiac testing  12/22/22    ECHO ADULT COMPLETE 12/26/2022 12/26/2022    Interpretation Summary    Left Ventricle: Severely reduced left ventricular systolic function with a visually estimated EF of 25 - 30%. Left ventricle size is normal. Normal wall thickness. There are regional wall motion abnormalities. Grade II diastolic dysfunction with increased LAP. Right Ventricle: Moderately reduced systolic function. TAPSE is abnormal. TAPSE is 1.1 cm. Aortic Valve: Mild stenosis of the aortic valve. AV peak gradient is 13 mmHg. AV peak velocity is 1.8 m/s. Mitral Valve: Not well visualized. Moderate annular calcification at the posterior leaflet of the mitral valve. Mild to moderate regurgitation. Tricuspid Valve: Mildly elevated RVSP. Left Atrium: Left atrium is moderately dilated. Signed by: Carolann Christensen MD on 12/26/2022  3:13 PM          10/27/22    NUCLEAR CARDIAC STRESS TEST 10/27/2022 11/1/2022    Interpretation Summary    Nuclear Findings: The study is positive for myocardial ischemia. The defect appears to be ischemia. The areas of ischemia are similar to 3/15/2021 study. Nuclear Findings:  There is a moderate severity left ventricular stress perfusion defect that is medium in size present in the mid to distal inferolateral and anterolateral segment(s) that is reversible. The defect is consistent with abnormal perfusion in the LCx territory. There is normal wall motion in the defect area. The defect appears to be probable ischemia. There is a moderate severity second left ventricular stress perfusion defect. The defect appears to be infarction. Nuclear Findings: Abnormal left ventricular systolic function post-stress. Septal dyskinesis. Post-stress ejection fraction is 39%. ECG: Resting ECG demonstrates normal sinus rhythm. ECG: Stress ECG was negative for ischemia. Stress Test: A pharmacological stress test was performed using lexiscan. Blood pressure demonstrated a normal response and heart rate demonstrated a normal response to stress. The patient's heart rate recovery was normal. The patient reported no symptoms during the stress test.    Signed by: Dolly Sanders MD on 10/27/2022  4:45 PM    02/28/22    CARDIAC PROCEDURE 02/28/2022 2/28/2022    Conclusion  Findings  1. Severe native multivessel coronary disease  2. Patent LIMA to LAD, vein graft to distal RCA with severe disease in the native vessels  3. Occluded vein graft to OM 2. Native OM 2 severely diseased along with proximal to mid circumflex as well as distal left main. Overall the vessel is significantly remodeled negatively. Additionally there is significant disease in the first marginal branch which is even smaller as well as AV groove branch right at the takeoff of OM2. Single stent 2.25 x 28 mm Xience was placed extending from the OM 2 into the circumflex into the distal left main and sequentially dilated with 2.25 noncompliant, 2 5 noncompliant, 3 oh noncompliant and 3 5 noncompliant to perform multilevel POT to manage multiple bifurcations on the way.   Atherectomy was considered but given small size of the vessels, was not deemed to be absolutely safe for the obtuse marginal lesion. Overall stent expanded fairly well related to the size of the vessels. 4.  Normal LVEDP    Access right radial: Small vessel, tortuous} brachiocephalic  Contrast 65 cc    Recommendations  1. Plavix based dual antiplatelet therapy  2. Guideline directed medical therapy for secondary prevention of coronary events    Signed by: Boom Holland MD on 2/28/2022  4:22 PM    Most recent HS troponins:  No results for input(s): TROPHS in the last 72 hours. No lab exists for component:  CKMB      Review of Systems    [x]All other systems reviewed and all negative except as written in HPI    [] Patient unable to provide secondary to condition         Past Medical History:   Diagnosis Date    CAD (coronary artery disease) 11/10/2016    NSTEMI & 2 stents    Deafness 10/28/2012    DM (diabetes mellitus) ([facility]     Elevated cholesterol     Hypertension     NSTEMI (non-ST elevated myocardial infarction) ([facility] 11/10/2016     Past Surgical History:   Procedure Laterality Date    COLONOSCOPY N/A 6/28/2018    COLONOSCOPY performed by Laura Ibanez MD at [address] [place] [address] St  11/11/2016    2 stents     Social Hx:  reports that he has never smoked. He has never been exposed to tobacco smoke. He has never used smokeless tobacco. He reports current alcohol use of about 2.0 standard drinks per week. He reports that he does not use drugs. Family Hx: family history includes Elevated Lipids in his brother, brother, and brother; Heart Attack in his father; Heart Disease in his father; Hypertension in his mother; No Known Problems in his daughter, sister, and son.   No Known Allergies       OBJECTIVE:  Wt Readings from Last 3 Encounters:   01/03/23 108 lb 3.9 oz (49.1 kg)   12/19/22 129 lb (58.5 kg)   12/16/22 126 lb 8.7 oz (57.4 kg)       Intake/Output Summary (Last 24 hours) at 1/3/2023 1435  Last data filed at 1/3/2023 1205  Gross per 24 hour   Intake 451.04 ml   Output 2350 ml   Net -1898.96 ml           Physical Exam    Vitals:   Vitals:    01/03/23 0900 01/03/23 1000 01/03/23 1101 01/03/23 1200   BP: 95/69 (!) 78/59 (!) 85/65 (!) 81/61   Pulse: 89 94 98 (!) 101   Resp: 11 18 19 11   Temp:    98 °F (36.7 °C)   SpO2:    96%   Weight:       Height:         Telemetry: normal sinus rhythm  BP (!) 81/61   Pulse (!) 101   Temp 98 °F (36.7 °C)   Resp 11   Ht 5' 9\" (1.753 m)   Wt 108 lb 3.9 oz (49.1 kg)   SpO2 96%   BMI 15.99 kg/m²   General:    Alert, cooperative, no distress. Psychiatric:    Normal Mood and affect    Eye/ENT:      Pupils equal, No asymmetry, Conjunctival pink. Able to hear voice at normal amplitude   Lungs:      Visibly symmetric chest expansion, No palpable tenderness. Clear to auscultation bilaterally. Heart[de-identified]    Regular rate and rhythm, S1, S2 normal, no murmur, click, rub or gallop. No JVD, Normal palpable peripheral pulses. No cyanosis   Abdomen:     Soft, non-tender. Bowel sounds normal. No masses,  No      organomegaly. Extremities:   Extremities normal, atraumatic, no edema. Neurologic:   CN II-XII grossly intact. No gross focal deficits           Data Review:     Radiology:   XR Results (most recent):  Results from Hospital Encounter encounter on 12/22/22    XR CHEST PORT    Narrative  INDICATION:  Rule out PNA    EXAM: Chest single view. COMPARISON: 12/31/2022. FINDINGS: A single frontal view of the chest at 0953 hours shows stable  bibasilar atelectasis and interstitial prominence. No new focal infiltrate. Small right pleural effusion stable. .  The heart, mediastinum and pulmonary  vasculature are stable status post median sternotomy . The bony thorax is  unremarkable for age. .    Impression  Stable bibasilar atelectasis and interstitial prominence with small right  pleural effusion. .  .     CT Results (most recent):  Results from Hospital Encounter encounter on 12/22/22    CT ABD PELV WO CONT    Narrative  EXAM: CT ABD PELV WO CONT    INDICATION: rlq abdominal pain    COMPARISON: 5/3/2014    IV CONTRAST: None. ORAL CONTRAST: None    TECHNIQUE:  Thin axial images were obtained through the abdomen and pelvis. Coronal and  sagittal reformats were generated. CT dose reduction was achieved through use of  a standardized protocol tailored for this examination and automatic exposure  control for dose modulation. The absence of intravenous contrast material reduces the sensitivity for  evaluation of the vasculature and solid organs. FINDINGS:  LOWER THORAX: Small bilateral pleural effusions. Partial collapse bases  LIVER: No mass. BILIARY TREE: Gallstones. No evidence of acute cholecystitis CBD is not dilated. SPLEEN: within normal limits. PANCREAS: No focal abnormality. ADRENALS: 19 mm right adrenal nodule unchanged,  KIDNEYS/URETERS: Mild bilateral hydronephrosis. No stone  STOMACH: Unremarkable. SMALL BOWEL: No dilatation or wall thickening. COLON: No dilatation or wall thickening. APPENDIX: Surgically absent  PERITONEUM: No ascites or pneumoperitoneum. RETROPERITONEUM: No lymphadenopathy or aortic aneurysm. Extensive vascular  calcifications  REPRODUCTIVE ORGANS: Mildly enlarged  URINARY BLADDER: Massive distention  BONES: No destructive bone lesion. ABDOMINAL WALL: Small umbilical hernia containing fat. ADDITIONAL COMMENTS: N/A    Impression  1. Bladder is massively distended with mild bilateral hydronephrosis. May  indicate bladder outlet obstruction. Prostate is mildly enlarged  2. Gallstones. No acute cholecystitis  3. Small bilateral pleural effusions    MRI Results (most recent):  Results from Hospital Encounter encounter on 05/22/16    MRI LUMB SPINE WO CONT    Narrative  **Final Report**      ICD Codes / Adm. Diagnosis: 724.2  M54.5 / Lumbago  Low back pain  Examination:  MR Isidro Simms CON  - [id number] - May 22 2016  1:45PM  Accession No:  [id number]  Reason:  Low back pain      REPORT:  Indication: Pain and back extends into right leg to foot    Exam: MRI of the lumbar spine. Sequences include sagittal and axial T1 and  T2-weighted images. Sagittal STIR. Comparisons: April 27, 2016    Contrast: None    Findings: Alignment is normal. There is no evidence of marrow replacement or  acute fracture. Cord terminus is within normal limits. Paraspinous soft  tissues are within normal limits. T12-L1: No stenosis    L1-L2: There is desiccation of this disc with a small bulge but no stenosis    L2-L3: There is desiccation of this disc with a small bulge but no stenosis    L3-L4: There is mild left facet arthropathy but no stenosis    L4-L5: There is disc height loss with a small disc bulge. Facet arthropathy. No stenosis    L5-S1: There is disc height loss with a small disc bulge and left  paracentral disc extrusion extending inferiorly. This mildly distorts the  left S1 nerve root in the subarticular zone and left lateral recess. There  are facet degenerative changes with moderate left and mild-to-moderate right  foraminal narrowing    Impression  :  1. Multilevel degenerative change detailed by level above most significant  at L5-S1                Signing/Reading Doctor: Yenifer Arechiga ([id number])  Approved: Yenifer Arechiga ([id number])  May 23 2016  8:39AM      No results for input(s): CPK, TROIQ in the last 72 hours. No lab exists for component: CKQMB, CPKMB, BMPP  Recent Labs     01/03/23 0233 01/02/23 0351   * 137   K 4.4 4.6   CL 98 101   CO2 27 25   * 105*   CREA 1.65* 1.93*   * 256*   PHOS 4.8* 4.8*   CA 10.2* 10.3*     Recent Labs     01/03/23 0233 01/02/23 0351   WBC 15.3* 17.3*   HGB 10.2* 10.0*   HCT 33.3* 33.8*    305     Recent Labs     01/03/23 0233 01/02/23 0351   * 173*       No results for input(s): CHOL, LDLC in the last 72 hours. No lab exists for component: TGL, HDLC,  HBA1C  No results for input(s): CRP, TSH, TSHEXT, TSHEXT in the last 72 hours.     No lab exists for component: ESR      Current meds:    Current Facility-Administered Medications:     digoxin (LANOXIN) tablet 0.125 mg, 0.125 mg, Oral, DAILY, Colleen Astorga NP, 0.125 mg at 01/03/23 1035    DOBUTamine (DOBUTREX) 500 mg/250 mL (2,000 mcg/mL) infusion, 4 mcg/kg/min, IntraVENous, CONTINUOUS, Kvng Turner MD, Last Rate: 5.9 mL/hr at 01/03/23 1201, 4 mcg/kg/min at 01/03/23 1201    insulin glargine (LANTUS) injection 48 Units, 48 Units, SubCUTAneous, DAILY, Blair Calderon MD, 48 Units at 01/03/23 0838    insulin lispro (HUMALOG) injection 5 Units, 5 Units, SubCUTAneous, TID WITH MEALS, Lincoln HARTLEY MD, 5 Units at 01/02/23 1232    mirtazapine (REMERON) tablet 7.5 mg, 7.5 mg, Oral, QHS, Becca Calderon MD, 7.5 mg at 01/02/23 2141    albuterol-ipratropium (DUO-NEB) 2.5 MG-0.5 MG/3 ML, 3 mL, Nebulization, Q6HWA RT, Becca Calderon MD, 3 mL at 01/03/23 0758    clopidogreL (PLAVIX) tablet 75 mg, 75 mg, Oral, DAILY, Dalia Mccormick NP, 75 mg at 01/03/23 0838    [Held by provider] bumetanide (BUMEX) injection 2 mg, 2 mg, IntraVENous, Q12H, Desire Dodd MD, 2 mg at 01/03/23 0837    [Held by provider] potassium chloride SR (KLOR-CON 10) tablet 40 mEq, 40 mEq, Oral, BID, Wicho MOLINA MD, 40 mEq at 01/03/23 0838    pantoprazole (PROTONIX) tablet 40 mg, 40 mg, Oral, ACB, David Aguilar, DO, 40 mg at 01/03/23 2984    hydrocortisone Sod Succ (PF) (SOLU-CORTEF) injection 50 mg, 50 mg, IntraVENous, Q12H, Colleen Astorga, JACOB, 50 mg at 01/03/23 0837    heparin (porcine) injection 5,000 Units, 5,000 Units, SubCUTAneous, Q12H, Susanne Dejesus DO, 5,000 Units at 01/03/23 0837    QUEtiapine (SEROquel) tablet 50 mg, 50 mg, Oral, QHS, David Aguilar, , 50 mg at 01/02/23 2140    lidocaine 4 % patch 1 Patch, 1 Patch, TransDERmal, Q24H, Wicho MOLINA MD, 1 Patch at 12/31/22 1643    balsam peru-castor oiL (VENELEX) ointment, , Topical, BID, Jessica Veras MD, Given at 01/03/23 8314    alteplase (CATHFLO) 1 mg in sterile water (preservative free) 1 mL injection, 1 mg, InterCATHeter, PRN, Kiya Kendall MD    bacitracin 500 unit/gram packet 1 Packet, 1 Packet, Topical, PRN, Floyd MOLINA MD    glucose chewable tablet 16 g, 4 Tablet, Oral, PRN, Floyd MOLINA MD    glucagon (GLUCAGEN) injection 1 mg, 1 mg, IntraMUSCular, PRN, Kiya Kendall MD    dextrose 10 % infusion 0-250 mL, 0-250 mL, IntraVENous, PRN, Floyd MOLINA MD    insulin lispro (HUMALOG) injection, , SubCUTAneous, AC&HS, [facility], DO, 6 Units at 01/02/23 2141    senna-docusate (Jose Loge) 8.6-50 mg per tablet 1 Tablet, 1 Tablet, Oral, BID PRN, Kiya Kendall MD, 1 Tablet at 12/26/22 1548    bisacodyL (DULCOLAX) suppository 10 mg, 10 mg, Rectal, QHS PRN, Floyd MOLINA MD    sodium chloride (NS) flush 5-40 mL, 5-40 mL, IntraVENous, Q8H, Walker Aponte MD, 10 mL at 01/02/23 2141    sodium chloride (NS) flush 5-40 mL, 5-40 mL, IntraVENous, PRN, Kiya Kendall MD, 10 mL at 12/28/22 0845    acetaminophen (TYLENOL) tablet 650 mg, 650 mg, Oral, Q6H PRN, 650 mg at 12/26/22 1714 **OR** acetaminophen (TYLENOL) suppository 650 mg, 650 mg, Rectal, Q6H PRN, Kiya Kendall MD    polyethylene glycol (MIRALAX) packet 17 g, 17 g, Oral, DAILY PRN, Kiya Kendall MD, 17 g at 12/26/22 0649    ondansetron (ZOFRAN ODT) tablet 4 mg, 4 mg, Oral, Q8H PRN **OR** ondansetron (ZOFRAN) injection 4 mg, 4 mg, IntraVENous, Q6H PRN, Floyd MOLINA MD, 4 mg at 12/24/22 0849    aspirin delayed-release tablet 81 mg, 81 mg, Oral, DAILY, Es Perez MD, 81 mg at 01/03/23 0838    ipratropium (ATROVENT) 21 mcg (0.03 %) nasal spray 2 Spray, 2 Spray, Both Nostrils, Q12H, Aleksandr Pearson MD, 2 [place] at 01/03/23 0838    tamsulosin (FLOMAX) capsule 0.4 mg, 0.4 mg, Oral, DAILY, Ranjit Pearson MD, 0.4 mg at 01/03/23 0838    sodium chloride (NS) flush 5-40 mL, 5-40 mL, IntraVENous, PRN, MD Lilly Kaiser MD  [facility], [address] [address]  [place]  [phone]      Melissa Jackson MD

## 2023-01-03 NOTE — PROGRESS NOTES
**Transportation scheduled for 12:00pm today, 1/3, with [facility] for cardiac MRI at [address] [facility]. Return trip pick-up scheduled for 3:00pm. Nursing staff will need to travel with patient. SARA: Anticipate discharge to SNF (referrals submitted) pending medical progress. Transportation likely BLS. RUR: 25%     Disposition: Patient re-admitted 12/22 for sepsis. Patient with a history of cardiomyopathy, CAD s/p stenting, NSTEMI, diabetes II, HTN, CKD III. Currently on dobutamine drip which will affect discharge placement. Irene has accepted for SNF. CM still awaiting call from [facility] ([phone]) to find out if they could accept patient with inotrope drip. CM received a call from nursing staff stating that [address] [place] [address] Road transport had to cancel for today's cardiac MRI. CM was able to arrange transportation through [facility]. CM will continue to follow. 1425  CM received a call from [facility]. They do not accept patients on intotrope drip. 1450  CM contacted [employer] ([phone]) for transportation authorization to and from cardiac MRI. Authorization #[id number] was provided to [facility] via Rachel Murphy.     Noemi Jolly, [facility] [facility],[address]  [phone]

## 2023-01-03 NOTE — PROGRESS NOTES
[facility]  Hospitalist Group                                                                                          Hospitalist Progress Note  Lopez Bejarano MD  Answering service: [phone] OR [phone] from in house phone        Date of Service:  1/3/2023  NAME:  Catherine Cardoza  :  1950  MRN:  [id number]      Admission Summary:   Catherine Cardoza is a 71 y.o. male who presents with progressively worsening shortness of breath for past several days. It has gotten worse to the point that he can only ambulate a few steps due to severe dyspnea and near syncope. Patient also noted abdominal distention which is increasing. Patient with known history of cardiomyopathy and recently admitted for CHF exacerbation a week ago. On presentation to the ER, chest x-ray shows diffuse airspace disease atypical infection versus edema. Also patient was noted to have elevated troponin and BNP. Patient was further evaluated by CT abdomen and pelvis which shows massively distended bladder with some bilateral hydronephrosis, subsequently Vasquez was placed. Patient also with significant leukocytosis as well as tachycardic and elevated lactic acid level and subsequently code sepsis was called. Hospitalist service requested admit the patient for further evaluation management. Interval history / Subjective:   Slept well, no SOB, no CP. Plan for cardiac MRI later today if transport can be arranged.       Assessment & Plan:      Acute on chronic systolic heart failure exacerbation-EF 25%  Concern for myocarditis  Cardiogenic shock-ongoing  Lactic acidosis  Coronary direct artery disease CAD status post CABG in 2018, PCI  -Cardiology and advanced heart failure following, per their notation suggestion of alternative mechanism to ischemia.   -Continue IV dobutamine ?if this will be long term for home - need to discuss with cardiology   -Continue bumex 2mg Q12 today   -Patient okay to transfer to stepdown unit, bed pending   - Unable to tolerate GDMT due to hypotension  -Viral and immune panel ordered through advanced heart failure services  -Continue hydrocortisone, for presumed myocarditis. Will need to discuss with cardiology regarding taper?   - planning for cardiac MRI today, pending transport. Acute kidney injury on CKD stage III-improving  -Continue Bumex IV  -Nephrology following  -I's and O's     Type 2 diabetes -A1c 6.5%  -Continue 48 of Lantus, sliding scale insulin, and increase to 5 U with meals   -POCT glucose checks and hypoglycemia protocol      Leukocytosis - WBC 20.8 today from 13.6  - No localizing sign of infection   - UA negative, CXR with interstitial changes, no new infiltrate  - Pro calcitonin reassuring  - ?secondary to steroids.   - Monitor daily     Insomnia, hospital delirium-continue Seroquel at bedtime, DC benadryl given age, and possible worsening urinary retention. Continue mirtazapine   Hypokalemia-continue potassium supplementation, check daily  BPH-continue tamsulosin, failed void trial 12/31, consider repeating in a few days when off benadryl   PAD, status post right SFA PTCA, follow-up with Dr. Carla Gage in 2 to 3 months          Code status: DNR  Prophylaxis: heparin   Care Plan discussed with: pt, RN   Anticipated Disposition: Greater than 48 hours, needs cardiac MRI on Tue, ?long term inotropes     Hospital Problems  Date Reviewed: 12/5/2022            Codes Class Noted POA    Systolic CHF, acute on chronic (HCC) ICD-10-CM: I50.23  ICD-9-CM: 428.23, 428.0  12/29/2022 Yes        Receiving inotropic medication ICD-10-CM: Z79.899  ICD-9-CM: V49.89  12/29/2022 Unknown        Sepsis ([facility] [address] ICD-10-CM: A41.9  ICD-9-CM: 038.9, 995.91  12/22/2022 Unknown         Review of Systems:   A comprehensive review of systems was negative except for that written in the HPI. Vital Signs:    Last 24hrs VS reviewed since prior progress note.  Most recent are:  Visit Vitals  BP (!) 78/59   Pulse 94   Temp 98 °F (36.7 °C)   Resp 18   Ht 5' 9\" (1.753 m)   Wt 49.1 kg (108 lb 3.9 oz)   SpO2 100%   BMI 15.99 kg/m²         Intake/Output Summary (Last 24 hours) at 1/3/2023 1054  Last data filed at 1/3/2023 0800  Gross per 24 hour   Intake 680.84 ml   Output 2890 ml   Net -2209.16 ml          Physical Examination:     I had a face to face encounter with this patient and independently examined them on 1/3/2023 as outlined below:          Constitutional:  No acute distress, cooperative, pleasant, frail elderly    ENT:  Oral mucosa dry  oropharynx benign. Resp:  CTA bilaterally. No wheezing/rhonchi/rales. No accessory muscle use. CV:  Regular rhythm, normal rate, no murmurs, gallops, rubs    GI:  Soft, non distended, non tender. normoactive bowel sounds, no hepatosplenomegaly     Musculoskeletal:  No edema, warm, 2+ pulses throughout    Neurologic:  Moves all extremities. Awake and alert, CN II-XII reviewed            Data Review:    Review and/or order of clinical lab test  Review and/or order of tests in the radiology section of CPT  Review and/or order of tests in the medicine section of CPT      Labs:     Recent Labs     01/03/23 0233 01/02/23 0351   WBC 15.3* 17.3*   HGB 10.2* 10.0*   HCT 33.3* 33.8*    305       Recent Labs     01/03/23 0233 01/02/23 0351 01/01/23 0233   * 137 138   K 4.4 4.6 4.3   CL 98 101 102   CO2 27 25 26   * 105* 106*   CREA 1.65* 1.93* 2.01*   * 256* 172*   CA 10.2* 10.3* 10.6*   MG 2.7* 2.8* 2.9*   PHOS 4.8* 4.8* 4.0       Recent Labs     01/03/23 0233 01/02/23 0351 01/01/23 0233   * 137* 161*   * 173* 156*   TBILI 0.7 0.8 0.8   TP 7.5 7.6 7.8   ALB 3.7 4.0 4.3   GLOB 3.8 3.6 3.5       No results for input(s): INR, PTP, APTT, INREXT, INREXT in the last 72 hours. No results for input(s): FE, TIBC, PSAT, FERR in the last 72 hours.    Lab Results   Component Value Date/Time    Folate 10.5 07/03/2018 09:24 AM        No results for input(s): PH, PCO2, PO2 in the last 72 hours. No results for input(s): CPK, CKNDX, TROIQ in the last 72 hours.     No lab exists for component: CPKMB  Lab Results   Component Value Date/Time    Cholesterol, total 143 10/12/2022 09:10 AM    HDL Cholesterol 49 10/12/2022 09:10 AM    LDL, calculated 41.4 10/12/2022 09:10 AM    Triglyceride 263 (H) 10/12/2022 09:10 AM    CHOL/HDL Ratio 2.9 10/12/2022 09:10 AM     Lab Results   Component Value Date/Time    Glucose (POC) 126 (H) 01/03/2023 08:12 AM    Glucose (POC) 225 (H) 01/02/2023 09:36 PM    Glucose (POC) 220 (H) 01/02/2023 05:18 PM    Glucose (POC) 487 (H) 01/02/2023 11:22 AM    Glucose (POC) 314 (H) 01/02/2023 08:00 AM     Lab Results   Component Value Date/Time    Color YELLOW/STRAW 01/01/2023 09:13 AM    Appearance CLEAR 01/01/2023 09:13 AM    Specific gravity 1.013 01/01/2023 09:13 AM    Specific gravity 1.020 05/03/2014 08:18 AM    pH (UA) 5.5 01/01/2023 09:13 AM    Protein 30 (A) 01/01/2023 09:13 AM    Glucose Negative 01/01/2023 09:13 AM    Ketone Negative 01/01/2023 09:13 AM    Bilirubin Negative 01/01/2023 09:13 AM    Urobilinogen 1.0 01/01/2023 09:13 AM    Nitrites Negative 01/01/2023 09:13 AM    Leukocyte Esterase Negative 01/01/2023 09:13 AM    Epithelial cells FEW 01/01/2023 09:13 AM    Bacteria Negative 01/01/2023 09:13 AM    WBC 0-4 01/01/2023 09:13 AM    RBC 0-5 01/01/2023 09:13 AM         Medications Reviewed:     Current Facility-Administered Medications   Medication Dose Route Frequency    digoxin (LANOXIN) tablet 0.125 mg  0.125 mg Oral DAILY    insulin glargine (LANTUS) injection 48 Units  48 Units SubCUTAneous DAILY    insulin lispro (HUMALOG) injection 5 Units  5 Units SubCUTAneous TID WITH MEALS    mirtazapine (REMERON) tablet 7.5 mg  7.5 mg Oral QHS    albuterol-ipratropium (DUO-NEB) 2.5 MG-0.5 MG/3 ML  3 mL Nebulization Q6HWA RT    clopidogreL (PLAVIX) tablet 75 mg  75 mg Oral DAILY    bumetanide (BUMEX) injection 2 mg  2 mg IntraVENous Q12H    potassium chloride SR (KLOR-CON 10) tablet 40 mEq  40 mEq Oral BID    pantoprazole (PROTONIX) tablet 40 mg  40 mg Oral ACB    hydrocortisone Sod Succ (PF) (SOLU-CORTEF) injection 50 mg  50 mg IntraVENous Q12H    heparin (porcine) injection 5,000 Units  5,000 Units SubCUTAneous Q12H    QUEtiapine (SEROquel) tablet 50 mg  50 mg Oral QHS    lidocaine 4 % patch 1 Patch  1 Patch TransDERmal Q24H    DOBUTamine (DOBUTREX) 500 mg/250 mL (2,000 mcg/mL) infusion  3 mcg/kg/min IntraVENous CONTINUOUS    balsam peru-castor oiL (VENELEX) ointment   Topical BID    alteplase (CATHFLO) 1 mg in sterile water (preservative free) 1 mL injection  1 mg InterCATHeter PRN    bacitracin 500 unit/gram packet 1 Packet  1 Packet Topical PRN    glucose chewable tablet 16 g  4 Tablet Oral PRN    glucagon (GLUCAGEN) injection 1 mg  1 mg IntraMUSCular PRN    dextrose 10 % infusion 0-250 mL  0-250 mL IntraVENous PRN    insulin lispro (HUMALOG) injection   SubCUTAneous AC&HS    senna-docusate (PERICOLACE) 8.6-50 mg per tablet 1 Tablet  1 Tablet Oral BID PRN    bisacodyL (DULCOLAX) suppository 10 mg  10 mg Rectal QHS PRN    sodium chloride (NS) flush 5-40 mL  5-40 mL IntraVENous Q8H    sodium chloride (NS) flush 5-40 mL  5-40 mL IntraVENous PRN    acetaminophen (TYLENOL) tablet 650 mg  650 mg Oral Q6H PRN    Or    acetaminophen (TYLENOL) suppository 650 mg  650 mg Rectal Q6H PRN    polyethylene glycol (MIRALAX) packet 17 g  17 g Oral DAILY PRN    ondansetron (ZOFRAN ODT) tablet 4 mg  4 mg Oral Q8H PRN    Or    ondansetron (ZOFRAN) injection 4 mg  4 mg IntraVENous Q6H PRN    aspirin delayed-release tablet 81 mg  81 mg Oral DAILY    ipratropium (ATROVENT) 21 mcg (0.03 %) nasal spray 2 Spray  2 Spray Both Nostrils Q12H    tamsulosin (FLOMAX) capsule 0.4 mg  0.4 mg Oral DAILY    sodium chloride (NS) flush 5-40 mL  5-40 mL IntraVENous PRN     ______________________________________________________________________  EXPECTED LENGTH OF STAY: 5d 0h  ACTUAL LENGTH OF STAY:          12                 Jeniffer Boles MD

## 2023-01-03 NOTE — PROGRESS NOTES
Patient is down at [facility] for cardiac MRI  Chart reviewed  CR down to 1.65  Good urine output, but slight hypotensive.   Agree with holding IV diuretics  We will also hold potassium, while we hold the diuretics    Discussed with RN    Harmony Will MD  [facility]

## 2023-01-03 NOTE — DIABETES MGMT
[address] [place] [address]  DIABETES MANAGEMENT CONSULT    Consulted by  Shaggy King MD   for advanced nursing evaluation and care for inpatient blood glucose management. Evaluation and Action Plan   Alivia Castaneda is a 71year old gentleman, with Type 2 Diabetes with a recent A1C of 6.5%, who was admitted with acute on chronic HFrEF and cardiogenic shock. He was admitted 2 weeks ago for similar complaint and antihyperglycemic agents were adjusted on discharge. Metformin was stopped due to decline in GFR and Jardiance switched to Eldridge (unclear why). He was compliant with his Juliette Chintan, Eldridge and Glipizide up until day prior to this hospitalization. His glucose was significantly elevated at 458 on admission, s/t insulin resistance from acute HFrEF, elevated lactate and impaired perfusion. Low dose basal insulin was started and sufficient to control BG. There was a suspicion for myocarditis and hydrocortisone 50mg twice daily was started. Basal insulin was escalated to 48 units but with an exaggerated post-prandial glucose spike to over 400 daily. Steroids will start to wean now and to off over the next several days. Would recommend switching to NPH basal as this mirrors the same duration as hydrocortisone. Inpatient BG goal is 140-180mg/dl.     Management Rationale Action Plan   Medication   Basal needs Using low dose 0.2 units/kg/D based   on low BMI Reduce Basal:    Switch to NPH with steroid wean over the next several days (shorter duration of action and less chance for fasting hypoglycemia with steroid wean)     Tomorrow am: 14 units NPH Q12 with each 25mg hydrocortisone dose   Nutritional needs Using low sensitivity based on low BMI 8 units Humalog with dinner tonight (on 50mg hydrocotrisone dose)    Then continue 5 units Humalog/meal tomorrow as steroids will wean    Hold if patient is NPO or consumes less than 50% of carbohydrates on meal tray    Advance by 2 units daily for persistent   pre-prandial hyperglycemia     Corrective insulin Using Resistant Sensitivity based on steroid dose. Resistant Sensitivity ACHS   Additional Recommendations. POC glucose ACHS    Consistent carbohydrate diet (60 grams CHO/meal). Initial Presentation   Eloisa August is a 71 y.o. male who presented to the ED 12/22/22 with a 3-4 day c/o gradually worsening dyspnea, fatigue, chills, constipation and RLQ pain. He endorses difficulty voiding despite compliance with lasix. In the ED, he was hypoxic and started on supplemental O2 and diuresis. LAB: WBC 25.1, , GFR 25, Lac 5.2, Trop 1096, BNP 6905  CXR: Widespread interstitial opacities bilateral mid to lower lung opacities  concerning for edema and/or atypical infection. Small bilateral pleural  effusions. CT: CT revealed distended bladder  EKG: Sinus tachycardia   Biatrial enlargement   Rightward axis   Marked ST abnormality, possible inferior subendocardial injury   Marked ST abnormality, possible lateral subendocardial injury   Abnormal ECG   Did have a hospital admission for HFrEF 12/11/22-12/16/22    HX:   Past Medical History:   Diagnosis Date    CAD (coronary artery disease) 11/10/2016    NSTEMI & 2 stents    Deafness 10/28/2012    DM (diabetes mellitus) ([facility] [address]     Elevated cholesterol     Hypertension     NSTEMI (non-ST elevated myocardial infarction) ([facility] [address] 11/10/2016    CKD  CAD with CABG 2018    INITIAL DX:   Sepsis ([facility] [address] [A41.9]     Current Treatment     TX: IVF, Diuresis, Supplemental O2    Hospital Course   Clinical progress has been complicated by:   63/34: Initial admission with hospitalist service but with progressive hypoxia early requiring CCU transfer. Elevated troponin/BNP. BPH and hydronephrosis. Pt pan cultured, given dose of abx and dose of IVP lasix. Pt placed on BiPAP w/ improvement in oxygenation. 12/23: Seen by urology for bilateral hydronephrosis related to distended bladder.   Maintain urinary catheter. 12/24: CVL placement   12/26: Decreased urine output, started on bumex and dobutamine gtt. Cardiology consult. Continue inotrope. 12/27: Advanced Heart Failure Consult. Started HF evaluation. Nephrology consult: Decreased bumex gtt  12/28: Mini pulse steroids per cardiology for treatment of myocarditis. Insulin gtt. 12/30: Transferred to hospitalist service. 1/3: Cardiac MRI: Ischemic CM  Diabetes History   Type 2 Diabetes  Ambulatory BG management provided by: PCP Hollie Morrow MD    Diabetes-related Medical History  Acute complications  Acute hyperglycemia  Neurological complications  Peripheral neuropathy  Microvascular disease  Nephropathy  Macrovascular disease  CAD  Other associated conditions     CHF    Diabetes Medication History  Key Antihyperglycemic Medications               dapagliflozin (FARXIGA) 10 mg tab tablet (Taking) Take 1 Tablet by mouth daily. glipiZIDE (GLUCOTROL) 5 mg tablet (Taking) Take 1 tablet by mouth twice daily    Januvia 50 mg tablet (Taking) Take 1 tablet by mouth once daily             Diabetes self-management practices:   Eating pattern   Eats 3 small meals daily  [x] Breakfast  2 Eggs, Coffee  [x] Lunch   [place]  [x] Dinner   \" Food\" Bread, rice, lentils   [x] Bedtime  [place]  [x] Snacks   Afternoon snack  [x] Beverages  Water, Coffee  Physical activity pattern   Sedentary   Monitoring pattern  Does not check BG  Taking medications pattern  [x] Consistent administration  [x] Affordable  Social determinants of health impacting diabetes self-management practices   Concerned that you need to know more about how to stay healthy with diabetes  Overall evaluation:    [x] Achieving A1c target with drug therapy & self-care practices    Subjective   I took my medicine yesterday.      Objective   Physical exam  General Underweight male in mild distress/ill-appearing.  Conversant and cooperative  Neuro  Alert, oriented   Vital Signs Visit Vitals  BP (!) 81/61   Pulse (!) 101   Temp 98 °F (36.7 °C)   Resp 11   Ht 5' 9\" (1.753 m)   Wt 49.1 kg (108 lb 3.9 oz)   SpO2 100%   BMI 15.99 kg/m²     Skin  Warm and dry. Acanthosis noted along neckline. No lipohypertrophy or lipoatrophy noted at injection sites   Heart   Regular rate and rhythm. 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        Blood glucose pattern    Significant diabetes-related events over the past 24-72 hours  A1C  6.5% 22 (down from 7.0 10/12/22)  Fasting B, 126  Pre-prandial: 346-457  Basal: 48 units daily, 44 units the day prior   Bolus: 5 units Humalog/meal.   Correction: 12 units in the last 24h  Hydrocortisone 50mg Q12 (started ) for suspicion of myocarditis- weaning to 25mg Q12 and taper to off over the next 4-6 days per [facility]  Plan for [facility] test (screening test for LVAD/inotrope candidacy)      Assessment and Nursing Intervention   Nursing Diagnosis Risk for unstable blood glucose pattern   Nursing Intervention Domain 8668 Decision-making Support   Nursing Interventions Examined current inpatient diabetes/blood glucose control   Explored factors facilitating and impeding inpatient management  Explored corrective strategies with patient and responsible inpatient provider   Informed patient of rational for insulin strategy while hospitalized     Nursing Diagnosis 90390 Ineffective Health Management   Nursing Intervention Domain 5251 Decision-making Support   Nursing Interventions Identified diabetes self-management practices impeding diabetes control  Discussed diabetes survival skills related to  Healthy Plate eating plan; given handouts  Role of physical activity in improving insulin sensitivity and action  Procedure for blood glucose monitoring & options for low-cost products  Medications plan at discharge     Billing Code(s)   [x] 00138/96621    Before making these care recommendations, I personally reviewed the hospitalization record, including notes, laboratory & diagnostic data and current medications, and examined the patient at the bedside (circumstances permitting) before making care recommendations. More than fifty (50) percent of the time was spent in patient counseling and/or care coordination.   Total minutes: [de-identified]    SLICK Rutherford  Diabetes Clinical Nurse Specialist  [facility]  Access via [employer]ac[employer] Serve

## 2023-01-03 NOTE — PROGRESS NOTES
Verbal bedside shift change report given to Padilla Langford (oncoming nurse) by Caleb Mathias (offgoing nurse). Report included the following information SBAR, Kardex, Intake/Output, MAR, Recent Results, Cardiac Rhythm Sinus Rhythm, and Alarm Parameters.       0700 Contacted [facility] to confirm transport for cardiac MRI, Tito Hinds confirmed they were aware, but that Case management should have backup transportation if team was on a call at that time    0730 Report to [facility]

## 2023-01-04 LAB
ALBUMIN SERPL-MCNC: 3.6 G/DL (ref 3.5–5)
ALBUMIN/GLOB SERPL: 0.9 (ref 1.1–2.2)
ALP SERPL-CCNC: 184 U/L (ref 45–117)
ALT SERPL-CCNC: 122 U/L (ref 12–78)
ANION GAP SERPL CALC-SCNC: 7 MMOL/L (ref 5–15)
AST SERPL-CCNC: 47 U/L (ref 15–37)
BASOPHILS # BLD: 0.1 K/UL (ref 0–0.1)
BASOPHILS NFR BLD: 1 % (ref 0–1)
BILIRUB SERPL-MCNC: 0.6 MG/DL (ref 0.2–1)
BNP SERPL-MCNC: 4106 PG/ML
BUN SERPL-MCNC: 96 MG/DL (ref 6–20)
BUN/CREAT SERPL: 61 (ref 12–20)
CALCIUM SERPL-MCNC: 10.4 MG/DL (ref 8.5–10.1)
CALCIUM SERPL-MCNC: 10.7 MG/DL (ref 8.5–10.1)
CHLORIDE SERPL-SCNC: 102 MMOL/L (ref 97–108)
CK SERPL-CCNC: 87 U/L (ref 39–308)
CO2 SERPL-SCNC: 26 MMOL/L (ref 21–32)
CREAT SERPL-MCNC: 1.57 MG/DL (ref 0.7–1.3)
DIFFERENTIAL METHOD BLD: ABNORMAL
DIGOXIN SERPL-MCNC: 0.4 NG/ML (ref 0.9–2)
EOSINOPHIL # BLD: 0 K/UL (ref 0–0.4)
EOSINOPHIL NFR BLD: 0 % (ref 0–7)
ERYTHROCYTE [DISTWIDTH] IN BLOOD BY AUTOMATED COUNT: 21.3 % (ref 11.5–14.5)
FERRITIN SERPL-MCNC: 463 NG/ML (ref 26–388)
GLOBULIN SER CALC-MCNC: 4.2 G/DL (ref 2–4)
GLUCOSE BLD STRIP.AUTO-MCNC: 144 MG/DL (ref 65–117)
GLUCOSE BLD STRIP.AUTO-MCNC: 146 MG/DL (ref 65–117)
GLUCOSE BLD STRIP.AUTO-MCNC: 162 MG/DL (ref 65–117)
GLUCOSE BLD STRIP.AUTO-MCNC: 383 MG/DL (ref 65–117)
GLUCOSE SERPL-MCNC: 127 MG/DL (ref 65–100)
HCT VFR BLD AUTO: 35.6 % (ref 36.6–50.3)
HGB BLD-MCNC: 10.4 G/DL (ref 12.1–17)
IMM GRANULOCYTES # BLD AUTO: 0.1 K/UL (ref 0–0.04)
IMM GRANULOCYTES NFR BLD AUTO: 1 % (ref 0–0.5)
IRON SATN MFR SERPL: 29 % (ref 20–50)
IRON SERPL-MCNC: 81 UG/DL (ref 35–150)
LACTATE SERPL-SCNC: 1.5 MMOL/L (ref 0.4–2)
LYMPHOCYTES # BLD: 0.5 K/UL (ref 0.8–3.5)
LYMPHOCYTES NFR BLD: 4 % (ref 12–49)
MAGNESIUM SERPL-MCNC: 2.8 MG/DL (ref 1.6–2.4)
MCH RBC QN AUTO: 22.9 PG (ref 26–34)
MCHC RBC AUTO-ENTMCNC: 29.2 G/DL (ref 30–36.5)
MCV RBC AUTO: 78.2 FL (ref 80–99)
MONOCYTES # BLD: 0.4 K/UL (ref 0–1)
MONOCYTES NFR BLD: 3 % (ref 5–13)
NEUTS SEG # BLD: 12.3 K/UL (ref 1.8–8)
NEUTS SEG NFR BLD: 91 % (ref 32–75)
NRBC # BLD: 0 K/UL (ref 0–0.01)
NRBC BLD-RTO: 0 PER 100 WBC
PHOSPHATE SERPL-MCNC: 3.8 MG/DL (ref 2.6–4.7)
PLATELET # BLD AUTO: 319 K/UL (ref 150–400)
PLATELET COMMENTS,PCOM: ABNORMAL
PMV BLD AUTO: 11.2 FL (ref 8.9–12.9)
POTASSIUM SERPL-SCNC: 4.4 MMOL/L (ref 3.5–5.1)
PROCALCITONIN SERPL-MCNC: 0.75 NG/ML
PROT SERPL-MCNC: 7.8 G/DL (ref 6.4–8.2)
PTH-INTACT SERPL-MCNC: 74.5 PG/ML (ref 18.4–88)
RBC # BLD AUTO: 4.55 M/UL (ref 4.1–5.7)
RBC MORPH BLD: ABNORMAL
SERVICE CMNT-IMP: ABNORMAL
SODIUM SERPL-SCNC: 135 MMOL/L (ref 136–145)
TIBC SERPL-MCNC: 277 UG/DL (ref 250–450)
TROPONIN-HIGH SENSITIVITY: 241 NG/L (ref 0–76)
WBC # BLD AUTO: 13.4 K/UL (ref 4.1–11.1)

## 2023-01-04 PROCEDURE — 74011250637 HC RX REV CODE- 250/637: Performed by: STUDENT IN AN ORGANIZED HEALTH CARE EDUCATION/TRAINING PROGRAM

## 2023-01-04 PROCEDURE — 74011250636 HC RX REV CODE- 250/636: Performed by: NURSE PRACTITIONER

## 2023-01-04 PROCEDURE — 74011636637 HC RX REV CODE- 636/637: Performed by: STUDENT IN AN ORGANIZED HEALTH CARE EDUCATION/TRAINING PROGRAM

## 2023-01-04 PROCEDURE — 74011250637 HC RX REV CODE- 250/637: Performed by: NURSE PRACTITIONER

## 2023-01-04 PROCEDURE — 80162 ASSAY OF DIGOXIN TOTAL: CPT

## 2023-01-04 PROCEDURE — 36415 COLL VENOUS BLD VENIPUNCTURE: CPT

## 2023-01-04 PROCEDURE — 74011000250 HC RX REV CODE- 250: Performed by: INTERNAL MEDICINE

## 2023-01-04 PROCEDURE — 85025 COMPLETE CBC W/AUTO DIFF WBC: CPT

## 2023-01-04 PROCEDURE — 84100 ASSAY OF PHOSPHORUS: CPT

## 2023-01-04 PROCEDURE — 74011636637 HC RX REV CODE- 636/637: Performed by: CLINICAL NURSE SPECIALIST

## 2023-01-04 PROCEDURE — 97530 THERAPEUTIC ACTIVITIES: CPT

## 2023-01-04 PROCEDURE — 82550 ASSAY OF CK (CPK): CPT

## 2023-01-04 PROCEDURE — 83735 ASSAY OF MAGNESIUM: CPT

## 2023-01-04 PROCEDURE — 74011250637 HC RX REV CODE- 250/637: Performed by: FAMILY MEDICINE

## 2023-01-04 PROCEDURE — 99233 SBSQ HOSP IP/OBS HIGH 50: CPT | Performed by: INTERNAL MEDICINE

## 2023-01-04 PROCEDURE — 74011250636 HC RX REV CODE- 250/636: Performed by: INTERNAL MEDICINE

## 2023-01-04 PROCEDURE — 83880 ASSAY OF NATRIURETIC PEPTIDE: CPT

## 2023-01-04 PROCEDURE — 65660000001 HC RM ICU INTERMED STEPDOWN

## 2023-01-04 PROCEDURE — 83540 ASSAY OF IRON: CPT

## 2023-01-04 PROCEDURE — 97535 SELF CARE MNGMENT TRAINING: CPT

## 2023-01-04 PROCEDURE — 84145 PROCALCITONIN (PCT): CPT

## 2023-01-04 PROCEDURE — 82728 ASSAY OF FERRITIN: CPT

## 2023-01-04 PROCEDURE — 74011000250 HC RX REV CODE- 250: Performed by: STUDENT IN AN ORGANIZED HEALTH CARE EDUCATION/TRAINING PROGRAM

## 2023-01-04 PROCEDURE — 83970 ASSAY OF PARATHORMONE: CPT

## 2023-01-04 PROCEDURE — 82962 GLUCOSE BLOOD TEST: CPT

## 2023-01-04 PROCEDURE — 84484 ASSAY OF TROPONIN QUANT: CPT

## 2023-01-04 PROCEDURE — 83605 ASSAY OF LACTIC ACID: CPT

## 2023-01-04 PROCEDURE — 80053 COMPREHEN METABOLIC PANEL: CPT

## 2023-01-04 PROCEDURE — 74011636637 HC RX REV CODE- 636/637: Performed by: INTERNAL MEDICINE

## 2023-01-04 PROCEDURE — 94640 AIRWAY INHALATION TREATMENT: CPT

## 2023-01-04 PROCEDURE — 74011250636 HC RX REV CODE- 250/636: Performed by: STUDENT IN AN ORGANIZED HEALTH CARE EDUCATION/TRAINING PROGRAM

## 2023-01-04 PROCEDURE — 74011250637 HC RX REV CODE- 250/637: Performed by: INTERNAL MEDICINE

## 2023-01-04 RX ORDER — INSULIN LISPRO 100 [IU]/ML
8 INJECTION, SOLUTION INTRAVENOUS; SUBCUTANEOUS ONCE
Status: COMPLETED | OUTPATIENT
Start: 2023-01-04 | End: 2023-01-04

## 2023-01-04 RX ORDER — HYDROCORTISONE SODIUM SUCCINATE 100 MG/2ML
25 INJECTION, POWDER, FOR SOLUTION INTRAMUSCULAR; INTRAVENOUS EVERY 12 HOURS
Status: DISCONTINUED | OUTPATIENT
Start: 2023-01-05 | End: 2023-01-05

## 2023-01-04 RX ORDER — HYDROCORTISONE SODIUM SUCCINATE 100 MG/2ML
25 INJECTION, POWDER, FOR SOLUTION INTRAMUSCULAR; INTRAVENOUS EVERY 12 HOURS
Status: DISCONTINUED | OUTPATIENT
Start: 2023-01-05 | End: 2023-01-04

## 2023-01-04 RX ORDER — HYDROCORTISONE SODIUM SUCCINATE 100 MG/2ML
50 INJECTION, POWDER, FOR SOLUTION INTRAMUSCULAR; INTRAVENOUS EVERY 12 HOURS
Status: COMPLETED | OUTPATIENT
Start: 2023-01-04 | End: 2023-01-04

## 2023-01-04 RX ORDER — IBUPROFEN 200 MG
4 TABLET ORAL AS NEEDED
Status: CANCELLED | OUTPATIENT
Start: 2023-01-04

## 2023-01-04 RX ORDER — HYDROCORTISONE SODIUM SUCCINATE 100 MG/2ML
25 INJECTION, POWDER, FOR SOLUTION INTRAMUSCULAR; INTRAVENOUS EVERY 12 HOURS
Status: DISCONTINUED | OUTPATIENT
Start: 2023-01-04 | End: 2023-01-04

## 2023-01-04 RX ORDER — DOBUTAMINE HYDROCHLORIDE 200 MG/100ML
4 INJECTION INTRAVENOUS CONTINUOUS
Status: DISCONTINUED | OUTPATIENT
Start: 2023-01-04 | End: 2023-01-05

## 2023-01-04 RX ORDER — INSULIN LISPRO 100 [IU]/ML
5 INJECTION, SOLUTION INTRAVENOUS; SUBCUTANEOUS
Status: DISCONTINUED | OUTPATIENT
Start: 2023-01-05 | End: 2023-01-06

## 2023-01-04 RX ORDER — HYDROCORTISONE SODIUM SUCCINATE 100 MG/2ML
25 INJECTION, POWDER, FOR SOLUTION INTRAMUSCULAR; INTRAVENOUS EVERY 12 HOURS
Status: CANCELLED | OUTPATIENT
Start: 2023-01-04

## 2023-01-04 RX ADMIN — PANTOPRAZOLE SODIUM 40 MG: 40 TABLET, DELAYED RELEASE ORAL at 07:02

## 2023-01-04 RX ADMIN — SODIUM CHLORIDE, PRESERVATIVE FREE 10 ML: 5 INJECTION INTRAVENOUS at 14:11

## 2023-01-04 RX ADMIN — ACETAMINOPHEN 650 MG: 325 TABLET ORAL at 22:32

## 2023-01-04 RX ADMIN — Medication: at 18:34

## 2023-01-04 RX ADMIN — IPRATROPIUM BROMIDE AND ALBUTEROL SULFATE 3 ML: .5; 3 SOLUTION RESPIRATORY (INHALATION) at 08:23

## 2023-01-04 RX ADMIN — SODIUM CHLORIDE, PRESERVATIVE FREE 10 ML: 5 INJECTION INTRAVENOUS at 07:04

## 2023-01-04 RX ADMIN — Medication 8 UNITS: at 18:33

## 2023-01-04 RX ADMIN — IPRATROPIUM BROMIDE AND ALBUTEROL SULFATE 3 ML: .5; 3 SOLUTION RESPIRATORY (INHALATION) at 15:30

## 2023-01-04 RX ADMIN — DIGOXIN 0.12 MG: 125 TABLET ORAL at 09:09

## 2023-01-04 RX ADMIN — INSULIN GLARGINE 48 UNITS: 100 INJECTION, SOLUTION SUBCUTANEOUS at 09:08

## 2023-01-04 RX ADMIN — HEPARIN SODIUM 5000 UNITS: 5000 INJECTION INTRAVENOUS; SUBCUTANEOUS at 22:32

## 2023-01-04 RX ADMIN — IPRATROPIUM BROMIDE 2 SPRAY: 21 SPRAY, METERED NASAL at 10:13

## 2023-01-04 RX ADMIN — IPRATROPIUM BROMIDE 2 SPRAY: 21 SPRAY, METERED NASAL at 22:32

## 2023-01-04 RX ADMIN — Medication 2 UNITS: at 09:08

## 2023-01-04 RX ADMIN — HEPARIN SODIUM 5000 UNITS: 5000 INJECTION INTRAVENOUS; SUBCUTANEOUS at 09:09

## 2023-01-04 RX ADMIN — HYDROCORTISONE SODIUM SUCCINATE 50 MG: 100 INJECTION, POWDER, FOR SOLUTION INTRAMUSCULAR; INTRAVENOUS at 09:09

## 2023-01-04 RX ADMIN — Medication 5 UNITS: at 09:08

## 2023-01-04 RX ADMIN — MIRTAZAPINE 7.5 MG: 15 TABLET, FILM COATED ORAL at 22:31

## 2023-01-04 RX ADMIN — TAMSULOSIN HYDROCHLORIDE 0.4 MG: 0.4 CAPSULE ORAL at 09:09

## 2023-01-04 RX ADMIN — Medication: at 10:13

## 2023-01-04 RX ADMIN — Medication 5 UNITS: at 12:55

## 2023-01-04 RX ADMIN — HYDROCORTISONE SODIUM SUCCINATE 50 MG: 100 INJECTION, POWDER, FOR SOLUTION INTRAMUSCULAR; INTRAVENOUS at 22:32

## 2023-01-04 RX ADMIN — Medication 2 UNITS: at 18:33

## 2023-01-04 RX ADMIN — QUETIAPINE FUMARATE 50 MG: 25 TABLET ORAL at 22:32

## 2023-01-04 RX ADMIN — Medication 7 UNITS: at 12:54

## 2023-01-04 RX ADMIN — DOBUTAMINE HYDROCHLORIDE 4 MCG/KG/MIN: 200 INJECTION INTRAVENOUS at 14:11

## 2023-01-04 RX ADMIN — ASPIRIN 81 MG: 81 TABLET, COATED ORAL at 09:09

## 2023-01-04 RX ADMIN — CLOPIDOGREL BISULFATE 75 MG: 75 TABLET ORAL at 09:09

## 2023-01-04 RX ADMIN — DOBUTAMINE HYDROCHLORIDE 4 MCG/KG/MIN: 200 INJECTION INTRAVENOUS at 10:13

## 2023-01-04 RX ADMIN — SODIUM CHLORIDE, PRESERVATIVE FREE 10 ML: 5 INJECTION INTRAVENOUS at 22:41

## 2023-01-04 RX ADMIN — IPRATROPIUM BROMIDE AND ALBUTEROL SULFATE 3 ML: .5; 3 SOLUTION RESPIRATORY (INHALATION) at 22:07

## 2023-01-04 NOTE — PROGRESS NOTES
Patient evaluated with Cammal Cognitive Assessment [facility]) to assess cognition in areas of visuospatial, executive, naming, memory, attention, language, abstraction, delayed recall, orientation. Patient scored 17/30, indicating mild cognitive impairment. Normal score is greater than or equal to 26/30. Patient with low score in the areas of memory, delayed recall, and visuospatial/executive function. Confounding factor(s): extended hospital course, lethargic d/t decreased sleep quality however was alert and awake throughout exam.    Kumar Cognitive Assessment [facility]):    Patient scored 17/30. The patient scored lower in areas of  memory, delayed recall, visuospatial, and executive function. Confounding factor(s) include: extended hospital course, lethargic d/t decreased sleep quality however was alert and awake throughout exam.         The Cammal Cognitive Assessment [facility]) is designed to assess cognition in areas of visuospatial, executive, naming, memory, attention, language, abstraction, delayed recall, and orientation. Score of greater than or equal to 26/30 is considered normal cognition. Score of less than 26 indicates mild cognitive impairment. Score of 16 or lower indicates severe cognitive impairment. Joseline Cabrera I., Best Goldenu., PAULINA Ralph. (2005). The [facility] Cognitive Assessment, [place]: A brief screening tool for mild cognitive impairment. Journal of [address], [address], [phone].        Caroline Baptiste MS, OTR/L

## 2023-01-04 NOTE — PROGRESS NOTES
0115 TRANSFER - IN REPORT:    Verbal report received from Melissa Randhawa RN(name) on Anna Webber  being received from CCU(unit) for routine progression of care      Report consisted of patients Situation, Background, Assessment and   Recommendations(SBAR). Information from the following report(s) SBAR, Kardex, Intake/Output, MAR, Recent Results, and Cardiac Rhythm NSR  was reviewed with the receiving nurse. Opportunity for questions and clarification was provided. 0245 Assessment completed upon patients arrival to unit and care assumed. Dual skin assessment performed by this RN and Darling Jaime RN. Red non-blanchable area noted to R heel. Foam dressing present on sacrum for prophylaxis. 8030 Bedside shift change report given to [facility] (oncoming nurse) by Wanda Hemphill (offgoing nurse). Report included the following information SBAR, Kardex, Intake/Output, MAR, Recent Results, and Cardiac Rhythm NSR/Sinus tach .

## 2023-01-04 NOTE — PROGRESS NOTES
01/02/23 0800   Laverne Fall Risk ([place] Only)   Mobility 1.0   Mobility Interventions Assess mobility with egress test;Communicate number of staff needed for ambulation/transfer;Patient to call before getting OOB   Mentation 0   Medication 1   Medication Interventions Assess postural VS orthostatic hypotension; Evaluate medications/consider consulting pharmacy; Patient to call before getting OOB   Elimination 1.00   Elimination Interventions Call light in reach; Patient to call for help with toileting needs   Prior Fall History 0   History of Falls Interventions Door open when patient unattended;Evaluate medications/consider consulting pharmacy   Total Score 3   Standard Fall Precautions Yes   High Fall Risk Yes     Patient is high fall risk, please coordinate with PT for six minute walk test.

## 2023-01-04 NOTE — PROGRESS NOTES
[address] [facility] Adult  Hospitalist Group                                                                                          Hospitalist Progress Note  Aniya Eller MD  Answering service: [phone] OR [phone] from in house phone        Date of Service:  2023  NAME:  Moses Benson  :  1950  MRN:  [id number]      Admission Summary:   Moses Benson is a 71 y.o. male who presents with progressively worsening shortness of breath for past several days. It has gotten worse to the point that he can only ambulate a few steps due to severe dyspnea and near syncope. Patient also noted abdominal distention which is increasing. Patient with known history of cardiomyopathy and recently admitted for CHF exacerbation a week ago. On presentation to the ER, chest x-ray shows diffuse airspace disease atypical infection versus edema. Also patient was noted to have elevated troponin and BNP. Patient was further evaluated by CT abdomen and pelvis which shows massively distended bladder with some bilateral hydronephrosis, subsequently Vasquez was placed. Patient also with significant leukocytosis as well as tachycardic and elevated lactic acid level and subsequently code sepsis was called. Hospitalist service requested admit the patient for further evaluation management. Interval history / Subjective:   MRI consistent with ischemic disease, and some viability. Harden Beech PCI not recommended by Dr. Shayy White. Remains on dobutamine with plans to decrease today   Pt denies any SOB, lightheadedness, remains with low appetitie.  Family requesting to speak to Advanced heart failure team.      Assessment & Plan:      Acute on chronic systolic heart failure exacerbation-EF 25%  Concern for myocarditis  Cardiogenic shock-ongoing  Lactic acidosis  Coronary direct artery disease CAD status post CABG in 2018, PCI  -Cardiology and advanced heart failure following, per their notation suggestion of alternative mechanism to ischemia.   -Continue IV dobutamine ?if this will be long term for home - need to discuss with cardiology   -Bumex on hold today   - Unable to tolerate GDMT due to hypotension  -Viral and immune panel ordered through advanced heart failure services  -Continue hydrocortisone, for presumed myocarditis. Per cardiology will initiate taper down. - s/p cardiac MRI, no sign of infiltrative process. Per Dr. Claudia Iqbal likely no benefit for PCI      Acute kidney injury on CKD stage III-improving  -Holding Bumex IV  -Nephrology following  -I's and O's     Type 2 diabetes -A1c 6.5%  -Continue 48 of Lantus, sliding scale insulin, and increase to 5 U with meals   -POCT glucose checks and hypoglycemia protocol   -Will improve with hydrocortisone wean   -Consult DTC     Leukocytosis - WBC 20.8 today from 13.6  - No localizing sign of infection   - UA negative, CXR with interstitial changes, no new infiltrate  - Pro calcitonin reassuring  - ?secondary to steroids.   - Monitor daily     Insomnia, hospital delirium-continue Seroquel at bedtime, DC benadryl given age, and possible worsening urinary retention. Continue mirtazapine   Hypokalemia-continue potassium supplementation, check daily  BPH-continue tamsulosin, failed void trial 12/31, consider repeating in a few days when off benadryl   PAD, status post right SFA PTCA, follow-up with Dr. Claudia Iqbal in 2 to 3 months          Code status: DNR  Prophylaxis: heparin   Care Plan discussed with: pt, RN   Anticipated Disposition: greater than 48 hours, needs to be weaned off dobutamine vs. Long term dobutamine.       Hospital Problems  Date Reviewed: 12/5/2022            Codes Class Noted POA    Systolic CHF, acute on chronic [facility]) ICD-10-CM: I50.23  ICD-9-CM: 428.23, 428.0  12/29/2022 Yes        Receiving inotropic medication ICD-10-CM: Z79.899  ICD-9-CM: V49.89  12/29/2022 Unknown        Sepsis ([facility] ICD-10-CM: A41.9  ICD-9-CM: 038.9, 995.91  12/22/2022 Unknown       Review of Systems:   A comprehensive review of systems was negative except for that written in the HPI. Vital Signs:    Last 24hrs VS reviewed since prior progress note. Most recent are:  Visit Vitals  BP (!) 107/57 (BP 1 Location: Left upper arm, BP Patient Position: At rest)   Pulse 100   Temp 98.4 °F (36.9 °C)   Resp 18   Ht 5' 9\" (1.753 m)   Wt 52.5 kg (115 lb 11.9 oz)   SpO2 100%   BMI 17.09 kg/m²         Intake/Output Summary (Last 24 hours) at 1/4/2023 1426  Last data filed at 1/4/2023 1228  Gross per 24 hour   Intake 1417.01 ml   Output 3220 ml   Net -1802.99 ml          Physical Examination:     I had a face to face encounter with this patient and independently examined them on 1/4/2023 as outlined below:          Constitutional:  No acute distress, cooperative, pleasant, frail elderly    ENT:  Oral mucosa dry  oropharynx benign. Resp:  CTA bilaterally. No wheezing/rhonchi/rales. No accessory muscle use. CV:  Regular rhythm, normal rate, no murmurs, gallops, rubs    GI:  Soft, non distended, non tender. normoactive bowel sounds, no hepatosplenomegaly     Musculoskeletal:  No edema, warm, 2+ pulses throughout    Neurologic:  Moves all extremities.   Awake and alert, CN II-XII reviewed            Data Review:    Review and/or order of clinical lab test  Review and/or order of tests in the radiology section of CPT  Review and/or order of tests in the medicine section of CPT      Labs:     Recent Labs     01/04/23 0231 01/03/23 0233   WBC 13.4* 15.3*   HGB 10.4* 10.2*   HCT 35.6* 33.3*    282       Recent Labs     01/04/23  0231 01/03/23  0233 01/02/23  0351   * 134* 137   K 4.4 4.4 4.6    98 101   CO2 26 27 25   BUN 96* 101* 105*   CREA 1.57* 1.65* 1.93*   * 110* 256*   CA 10.4*  10.7* 10.2* 10.3*   MG 2.8* 2.7* 2.8*   PHOS 3.8 4.8* 4.8*       Recent Labs     01/04/23  0231 01/03/23  0233 01/02/23  0351   * 122* 137*   * 171* 173*   TBILI 0.6 0.7 0.8   TP 7.8 7.5 7. 6   ALB 3.6 3.7 4.0   GLOB 4.2* 3.8 3.6       No results for input(s): INR, PTP, APTT, INREXT, INREXT in the last 72 hours. Recent Labs     01/04/23  0231   TIBC 277   PSAT 29   FERR 463*        Lab Results   Component Value Date/Time    Folate 10.5 07/03/2018 09:24 AM        No results for input(s): PH, PCO2, PO2 in the last 72 hours.   Recent Labs     01/04/23 0231   CPK 87     Lab Results   Component Value Date/Time    Cholesterol, total 143 10/12/2022 09:10 AM    HDL Cholesterol 49 10/12/2022 09:10 AM    LDL, calculated 41.4 10/12/2022 09:10 AM    Triglyceride 263 (H) 10/12/2022 09:10 AM    CHOL/HDL Ratio 2.9 10/12/2022 09:10 AM     Lab Results   Component Value Date/Time    Glucose (POC) 383 (H) 01/04/2023 11:51 AM    Glucose (POC) 162 (H) 01/04/2023 06:19 AM    Glucose (POC) 457 (H) 01/03/2023 09:13 PM    Glucose (POC) 346 (H) 01/03/2023 04:56 PM    Glucose (POC) 126 (H) 01/03/2023 08:12 AM     Lab Results   Component Value Date/Time    Color YELLOW/STRAW 01/01/2023 09:13 AM    Appearance CLEAR 01/01/2023 09:13 AM    Specific gravity 1.013 01/01/2023 09:13 AM    Specific gravity 1.020 05/03/2014 08:18 AM    pH (UA) 5.5 01/01/2023 09:13 AM    Protein 30 (A) 01/01/2023 09:13 AM    Glucose Negative 01/01/2023 09:13 AM    Ketone Negative 01/01/2023 09:13 AM    Bilirubin Negative 01/01/2023 09:13 AM    Urobilinogen 1.0 01/01/2023 09:13 AM    Nitrites Negative 01/01/2023 09:13 AM    Leukocyte Esterase Negative 01/01/2023 09:13 AM    Epithelial cells FEW 01/01/2023 09:13 AM    Bacteria Negative 01/01/2023 09:13 AM    WBC 0-4 01/01/2023 09:13 AM    RBC 0-5 01/01/2023 09:13 AM         Medications Reviewed:     Current Facility-Administered Medications   Medication Dose Route Frequency    DOBUTamine (DOBUTREX) 500 mg/250 mL (2,000 mcg/mL) infusion  4 mcg/kg/min IntraVENous CONTINUOUS    hydrocortisone Sod Succ (PF) (SOLU-CORTEF) injection 25 mg  25 mg IntraVENous Q12H    digoxin (LANOXIN) tablet 0.125 mg  0.125 mg Oral DAILY    insulin glargine (LANTUS) injection 48 Units  48 Units SubCUTAneous DAILY    insulin lispro (HUMALOG) injection 5 Units  5 Units SubCUTAneous TID WITH MEALS    mirtazapine (REMERON) tablet 7.5 mg  7.5 mg Oral QHS    albuterol-ipratropium (DUO-NEB) 2.5 MG-0.5 MG/3 ML  3 mL Nebulization Q6HWA RT    clopidogreL (PLAVIX) tablet 75 mg  75 mg Oral DAILY    [Held by provider] bumetanide (BUMEX) injection 2 mg  2 mg IntraVENous Q12H    [Held by provider] potassium chloride SR (KLOR-CON 10) tablet 40 mEq  40 mEq Oral BID    pantoprazole (PROTONIX) tablet 40 mg  40 mg Oral ACB    heparin (porcine) injection 5,000 Units  5,000 Units SubCUTAneous Q12H    QUEtiapine (SEROquel) tablet 50 mg  50 mg Oral QHS    lidocaine 4 % patch 1 Patch  1 Patch TransDERmal Q24H    balsam peru-castor oiL (VENELEX) ointment   Topical BID    alteplase (CATHFLO) 1 mg in sterile water (preservative free) 1 mL injection  1 mg InterCATHeter PRN    bacitracin 500 unit/gram packet 1 Packet  1 Packet Topical PRN    glucose chewable tablet 16 g  4 Tablet Oral PRN    glucagon (GLUCAGEN) injection 1 mg  1 mg IntraMUSCular PRN    dextrose 10 % infusion 0-250 mL  0-250 mL IntraVENous PRN    insulin lispro (HUMALOG) injection   SubCUTAneous AC&HS    senna-docusate (PERICOLACE) 8.6-50 mg per tablet 1 Tablet  1 Tablet Oral BID PRN    bisacodyL (DULCOLAX) suppository 10 mg  10 mg Rectal QHS PRN    sodium chloride (NS) flush 5-40 mL  5-40 mL IntraVENous Q8H    sodium chloride (NS) flush 5-40 mL  5-40 mL IntraVENous PRN    acetaminophen (TYLENOL) tablet 650 mg  650 mg Oral Q6H PRN    Or    acetaminophen (TYLENOL) suppository 650 mg  650 mg Rectal Q6H PRN    polyethylene glycol (MIRALAX) packet 17 g  17 g Oral DAILY PRN    ondansetron (ZOFRAN ODT) tablet 4 mg  4 mg Oral Q8H PRN    Or    ondansetron (ZOFRAN) injection 4 mg  4 mg IntraVENous Q6H PRN    aspirin delayed-release tablet 81 mg  81 mg Oral DAILY    ipratropium (ATROVENT) 21 mcg (0.03 %) nasal spray 2 Spray  2 Spray Both Nostrils Q12H    tamsulosin (FLOMAX) capsule 0.4 mg  0.4 mg Oral DAILY    sodium chloride (NS) flush 5-40 mL  5-40 mL IntraVENous PRN     ______________________________________________________________________  EXPECTED LENGTH OF STAY: 5d 0h  ACTUAL LENGTH OF STAY:          13                 Mary Yoon MD

## 2023-01-04 NOTE — PROGRESS NOTES
Problem: Mobility Impaired (Adult and Pediatric)  Goal: *Therapy Goal (Edit Goal, Insert Text)  Description: FUNCTIONAL STATUS PRIOR TO ADMISSION: Patient was independent and active without use of DME. Patient is currently driving. Patient is independent with ADLs and IADLs. HOME SUPPORT PRIOR TO ADMISSION: The patient lived with his wife, but did not require assist.    Continued 1/4/2023  1. Patient will move from supine to sit and sit to supine, scoot up and down, and roll side to side in bed with modified independence within 7 day(s). 2.  Patient will transfer from bed to chair and chair to bed with modified independence using the least restrictive device within 7 day(s). 3.  Patient will perform sit to stand with modified independence within 7 day(s). 4.  Patient will ambulate with modified independence for 150 feet with the least restrictive device within 7 day(s). 5.  Patient will ascend/descend 13 stairs with single handrail(s) with modified independence within 7 day(s). Physical Therapy Goals  Initiated 12/24/2022  1. Patient will move from supine to sit and sit to supine, scoot up and down, and roll side to side in bed with modified independence within 7 day(s). 2.  Patient will transfer from bed to chair and chair to bed with modified independence using the least restrictive device within 7 day(s). 3.  Patient will perform sit to stand with modified independence within 7 day(s). 4.  Patient will ambulate with modified independence for 150 feet with the least restrictive device within 7 day(s). 5.  Patient will ascend/descend 13 stairs with single handrail(s) with modified independence within 7 day(s).     Outcome: Progressing Towards Goal     PHYSICAL THERAPY WEEKLY REASSESSMENT  Patient: Bayron Carvajal (76 y.o. male)  Date: 1/4/2023  Diagnosis: Sepsis ([facility] [A41.9] <principal problem not specified>      Precautions: Fall  Chart, physical therapy assessment, plan of care and goals were reviewed. ASSESSMENT  Patient continues with skilled PT services and is progressing towards goals. Chart reviewed, of note RT note states that patient is a fall risk and requests PT to do a 6MWT. RN cleared to see. Patient received in bed agreeable to treatment with several family members at bedside. Family left room when therapist arrived. BP noted to be soft at rest (84/53) however patient reports no s/s of OH. Patient cued to perform supine ankle pumps. Patient able to sit EOB with CGA. Once sitting, patient became quiet and looking down to floor. Continues to deny dizziness. Patient BP 65/45. Alerted RN and assisted in returning to supine. Placed patient in trendlenbeg position and cued ankle pumps. BP improved. RN in room to assess. Discontinued remainder of session. Will have to follow up at next opportunity for further gait training/6MWT as appropriate. Continue to recommend IPR at discharge due to current deficits and high PLOF. Patient very pleasant, agreeable, and motivated.         01/04/23 1508 01/04/23 1509 01/04/23 1512   Vital Signs   Temp  --   --   --    Temp Source  --   --   --    Pulse (Heart Rate)  --   --   --    Heart Rate Source  --   --   --    Heart Rate (Monitor) 95 99 100   Level of Consciousness  --   --   --    BP (!) 84/53 (!) 65/45 92/64   MAP (Calculated) (!) 63 (!) 52 73   BP 1 Location Left upper arm Left upper arm Left upper arm   BP 1 Method Automatic Automatic Automatic   BP Patient Position At rest;Semi fowlers Sitting Other (Comment)  (trendelenberg)   Resp Rate 18  --   --    O2 Sat (%) 99 %  --   --    O2 Device  --   --   --    MEWS Score  --   --   --       01/04/23 1513   Vital Signs   Temp 97.8 °F (36.6 °C)   Temp Source Oral   Pulse (Heart Rate) 100   Heart Rate Source Monitor   Heart Rate (Monitor) 99   Level of Consciousness 0   BP 94/66   MAP (Calculated) 75   BP 1 Location Left upper arm   BP 1 Method Automatic   BP Patient Position Semi fowlers  (after trendelenberg and ankle pumps)   Resp Rate 15   O2 Sat (%) 98 %   O2 Device None (Room air)   MEWS Score 2       Current Level of Function Impacting Discharge (mobility/balance): CGA to reach EOB, Min A sit to supine, min A x 2 for scooting in bed    Other factors to consider for discharge: higher PLOF, strong family support         PLAN :  Patient continues to benefit from skilled intervention to address the above impairments. Continue treatment per established plan of care. to address goals. Recommendation for discharge: (in order for the patient to meet his/her long term goals)  Therapy 3 hours per day 5-7 days per week    This discharge recommendation:  Has been made in collaboration with the attending provider and/or case management    IF patient discharges home will need the following DME: to be determined (TBD)       SUBJECTIVE:   Patient stated I feel ok.     OBJECTIVE DATA SUMMARY:   Critical Behavior:  Neurologic State: Alert  Orientation Level: Oriented X4  Cognition: Appropriate decision making, Appropriate safety awareness, Follows commands  Safety/Judgement: Awareness of environment, Fall prevention, Insight into deficits  Functional Mobility Training:  Bed Mobility:  Rolling: Contact guard assistance  Supine to Sit: Contact guard assistance  Sit to Supine: Minimum assistance  Scooting: Minimum assistance;Assist x2        Transfers:  Sit to Stand: Contact guard assistance  Stand to Sit: Contact guard assistance        Bed to Chair: Contact guard assistance    Balance:  Sitting: Intact; Without support  Sitting - Static: Good (unsupported)  Sitting - Dynamic: Fair (occasional)  Standing: Impaired  Standing - Static: Constant support;Good  Standing - Dynamic : Constant support; Fair  Ambulation/Gait Training:     Unable secondary to [facility]    Therapeutic Exercises:   Reviewed bed level exercises and wrote on white board due to [facility] limiting OOB activity    Pain Rating  No pain reported     Activity Tolerance:   Fair, Poor, and signs and symptoms of orthostatic hypotension    After treatment patient left in no apparent distress:   Supine in bed, Heels elevated for pressure relief, Call bell within reach, and Side rails x 3    COMMUNICATION/COLLABORATION:   The patients plan of care was discussed with: Occupational therapist and Registered nurse.      Neetu Heaton, PT   Time Calculation: 14 mins

## 2023-01-04 NOTE — PROGRESS NOTES
Problem: Self Care Deficits Care Plan (Adult)  Goal: *Acute Goals and Plan of Care (Insert Text)  Description: FUNCTIONAL STATUS PRIOR TO ADMISSION: Patient was independent and active without use of DME. 2nd readmission in past month. Was completing ADLs and IADls without difficulty, went home on RA. Reports having difficulty doing stairs 2/2 increased SOB resulting in readmission. HOME SUPPORT: The patient lived with spouse but did not require assist.    Occupational Therapy Goals  Initiated 12/24/2022; continue goals 12/29  1. Patient will perform upper body dressing with supervision/set-up within 7 day(s). 2.  Patient will perform lower body dressing with supervision/set-up within 7 day(s). 3.  Patient will perform bathing with supervision/set-up within 7 day(s). 4.  Patient will perform toilet transfers with supervision/set-up within 7 day(s). 5.  Patient will perform all aspects of toileting with supervision/set-up within 7 day(s). 6.  Patient will participate in upper extremity therapeutic exercise/activities with supervision/set-up for 5 minutes within 7 day(s). 7.  Patient will utilize energy conservation techniques during functional activities with verbal cues within 7 day(s). Outcome: Progressing Towards Goal   OCCUPATIONAL THERAPY TREATMENT  Patient: Jim Warren (76 y.o. male)  Date: 1/4/2023  Diagnosis: Sepsis ([facility] [A41.9] <principal problem not specified>      Precautions: Fall  Chart, occupational therapy assessment, plan of care, and goals were reviewed. ASSESSMENT  Patient continues with skilled OT services and is progressing towards goals. Patient received reclined in bed, amenable to session. Benefits from x1 assist for transfers and functional mobility with RW. BP soft throughout session, however improved with movement and time. Completed seated dressing with supervision, completed functional mobility to bathroom with Min A for RW management and balance.  Completed standing grooming with supervision prior to returning to bedside chair. Left with all needs in reach, Nad. Patient will benefit from IPR upon discharge pending further progress. 01/04/23 0859 01/04/23 0910 01/04/23 0915   Vital Signs   BP (!) 81/62 (!) 89/65 (!) 76/55   MAP (Calculated) 68 73 (!) 62   BP 1 Location  --  Left upper arm Left upper arm   BP 1 Method  --  Automatic Automatic   BP Patient Position  --  Sitting Standing      01/04/23 0920 01/04/23 0940   Vital Signs   BP 95/68 102/80   MAP (Calculated) 77 87   BP 1 Location Left upper arm Left upper arm   BP 1 Method Automatic Automatic   BP Patient Position Sitting Sitting        Current Level of Function Impacting Discharge (ADLs): up to x1 assist mobility/ADL    Other factors to consider for discharge: below baseline, soft BP throughout session. PLAN :  Patient continues to benefit from skilled intervention to address the above impairments. Continue treatment per established plan of care to address goals. Recommend with staff: OOB 3x daily for meals, transfers to [address] [place] [address] Road next OT session: standing grooming as BP allows    Recommendation for discharge: (in order for the patient to meet his/her long term goals)  Therapy 3 hours per day 5-7 days per week    This discharge recommendation:  Has been made in collaboration with the attending provider and/or case management    IF patient discharges home will need the following DME: TBD pending progress       SUBJECTIVE:   Patient stated It feels good to be up.     OBJECTIVE DATA SUMMARY:   Cognitive/Behavioral Status:  Neurologic State: Alert  Orientation Level: Oriented X4  Cognition: Appropriate decision making; Appropriate safety awareness; Follows commands  Perception: Appears intact  Perseveration: No perseveration noted  Safety/Judgement: Awareness of environment; Fall prevention; Insight into deficits    Functional Mobility and Transfers for ADLs:  Bed Mobility:  Supine to Sit: Contact guard assistance    Transfers:  Sit to Stand: Contact guard assistance     Bed to Chair: Contact guard assistance    Balance:  Sitting: Intact; Without support  Sitting - Static: Good (unsupported)  Sitting - Dynamic: Fair (occasional)  Standing: Impaired  Standing - Static: Constant support;Good  Standing - Dynamic : Constant support; Fair    ADL Intervention:       Grooming  Position Performed: Standing  Washing Face: Supervision  Brushing Teeth: Supervision  Cues: Verbal cues provided                        Lower Body Dressing Assistance  Socks: Supervision  Position Performed: Seated edge of bed         Cognitive Retraining  Safety/Judgement: Awareness of environment; Fall prevention; Insight into deficits      Pain:  None reported    Activity Tolerance:   Fair and requires rest breaks    After treatment patient left in no apparent distress:   Sitting in chair, Call bell within reach, and Caregiver / family present    COMMUNICATION/COLLABORATION:   The patients plan of care was discussed with: Physical therapist and Registered nurse.      Angel Vines OT  Time Calculation: 39 mins

## 2023-01-04 NOTE — PROGRESS NOTES
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, LVEF 25-30%, stage C, NYHA class IIIA, unable to up-titrate GDMT for heart failure due to hypotension on dobutamine gtt  Most likely etiology of cardiomyopathy: CAD +/- TRISTAN +/- inappropriate sinus tach +/- undergoing workup (cardiac MRI with ischemic CM, PYP equivocal, gammopathy and genetics pending)  In order to determine prognosis and timing of consideration for LVAD-DT evaluation; patient will need RHC off inotropes when clinically euvolemic and cannot walk > 200 meters; will work on inotrope wean this week     RECOMMENDATIONS:  Decrease dobutamine to 4 mcg/kg/min and re-dose to current weight 52.5kg from 49kg  Hypotension likely due to aggressive diuresis, appears clinically dry; hold diuretics today  Check NICOM today and wean dobutamine to NICOM as BP improves over next days   Check orthostatics  6 minute walk  Obtain MOCA test (screening test for LVAD/inotrope candidacy)  Inpatient sleep medicine consult (high risk for TRISTAN)  Continue digoxin 0.125mcg daily, check digoxin level daily (today 0.4)  Steroids per primary team  Uric acid level 6.8  Cannot tolerate beta-blockers due to dobutamine  Cannot tolerate ACEi/ARB/ARNi/MRA due to acute renal failure  Cannot tolerate SGLT2i due to borderline eGFR  Continue baby aspirin and plavix  DNR     IMPRESSION:  Acute on chronic combined systolic/diastolic  heart failure  Stage D, NYHA class IV symptoms  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 Patient's personal goals include: TBD  Important upcoming milestones or family events: TBD  The patient identifies the following as important for living well: TBD    Patient assessed. High suspicion of sleep apnea due to the following reasons. Will refer for sleep evaluation. [x] Heart Failure  [] Hypertention  [x] Atrial Fibrillation  [] BMI > 30  [] History of stroke  [] Diabetes  [x] Heavy snoring  [] Witnessed apnea  [] Hypoxemia         HPI:  71 y.o. male who was admitted via ED for worsening SOB, poor appetite, orthopnea and fatigue. Pmhx of CAD s/p CABG, PAD, DM 2, recent admission for HFmrEF earlier this month. Serial TTEs show progressive decline in EF since 3/2021 with the most recent EF at 25-30%. Recent LHC shows diffuse CAD and no interventions indicated. Patient had Jesse recently and there is some thought to myocarditis or other etiology causing worsening HF. [facility] was consulted for further evaluation and management of HFrEF. CARDIAC IMAGING:  Echo 12/26/22    Left Ventricle: Severely reduced left ventricular systolic function with a visually estimated EF of 25 - 30%. Left ventricle size is normal. Normal wall thickness. There are regional wall motion abnormalities. Grade II diastolic dysfunction with increased LAP. Right Ventricle: Moderately reduced systolic function. TAPSE is abnormal. TAPSE is 1.1 cm. Aortic Valve: Mild stenosis of the aortic valve. AV peak gradient is 13 mmHg. AV peak velocity is 1.8 m/s.     Mitral Valve: Not well visualized. Moderate annular calcification at the posterior leaflet of the mitral valve. Mild to moderate regurgitation. Tricuspid Valve: Mildly elevated RVSP. Left Atrium: Left atrium is moderately dilated. 12/8/22    Left Ventricle: Moderately reduced left ventricular systolic function with a visually estimated EF of 35 - 40%. Severe hypokinesis of the following segments: mid anteroseptal, apical anterior, apical septal, apical inferior and apical lateral. Severe hypokinesis of the apex. Mitral Valve: Severely thickened leaflet, at the anterior and posterior leaflets. Severely calcified leaflet, at the anterior and posterior leaflets. Mild annular calcification of the mitral valve. Moderate regurgitation. Left Atrium: Left atrium is mildly dilated. Contrast used: Definity. limited study     EKG 12/22/22 ST, Biatria enlargement, marked ST abnormality     LHC 12/6/22  1. Normal LVEDP  2. Severe native multivessel coronary artery disease  3. Patent LIMA to LAD and vein graft to distal RCA  4. Recurrent ISR in OM1 stent with now 60 to 70% restenosis  5. Recoil of left main and circumflex stent with now recurrent 40 to 50% stenosis. 6.  Progression of ostial left main disease now to about 60% stenosis  7. Progression of disease in jailed first marginal branch now with diffuse 90% stenosis  8. High-grade stenosis in the mid to distal right potential femoral artery treated with 6 x 40 mm impact drug-coated balloon angioplasty to reduce the stenosis to less than 40%     NST        HEMODYNAMICS:  RHC not done  CPEST too ill   6MW too ill       PHYSICAL EXAM:  Visit Vitals  /64 (BP 1 Location: Left upper arm, BP Patient Position: At rest)   Pulse 89   Temp 98.5 °F (36.9 °C)   Resp 12   Ht 5' 9\" (1.753 m)   Wt 115 lb 11.9 oz (52.5 kg)   SpO2 100%   BMI 17.09 kg/m²     General: Patient is cachectic in no acute distress  HEENT: Unremarkable   Neck: Supple.  No evidence of thyroid enlargements or lymphadenopathy. JVD: Cannot be appreciated   Lungs: Breath sounds are equal and clear bilaterally. No wheezes, rhonchi, or rales. Heart: Regular rate and rhythm with normal S1 and S2. No murmurs, gallops or rubs. Abdomen: Soft, no mass or tenderness. No organomegaly or hernia. Bowel sounds present. Genitourinary and rectal: deferred  Extremities: No cyanosis, clubbing, or edema. Neurologic: No focal sensory or motor deficits are noted. Grossly intact. Psychiatric: Awake, alert an doriented x 3. Appropriate mood and affect. Skin: Warm, dry and well perfused. REVIEW OF SYSTEMS:  General: Denies fever. Ear, nose and throat: Denies difficulty hearing, sinus problems, nosebleeds  Cardiovascular: see above in the interval history  Respiratory: Denies cough, wheezing, sputum production, hemoptysis.   Gastrointestinal: Denies heartburn, constipation, diarrhea, abdominal pain, nausea, blood in stool  Kidney and bladder: Denies painful urination, frequent urination  Musculoskeletal: Denies joint pain, muscle weakness  Skin and hair: Denies change in existing skin lesions    PAST MEDICAL HISTORY:  Past Medical History:   Diagnosis Date    CAD (coronary artery disease) 11/10/2016    NSTEMI & 2 stents    Deafness 10/28/2012    DM (diabetes mellitus) ([facility]     Elevated cholesterol     Hypertension     NSTEMI (non-ST elevated myocardial infarction) ([facility] 11/10/2016       PAST SURGICAL HISTORY:  Past Surgical History:   Procedure Laterality Date    COLONOSCOPY N/A 6/28/2018    COLONOSCOPY performed by Piotr Davalos MD at [facility] ENDOSCOPY    [address]  11/11/2016    2 stents       FAMILY HISTORY:  Family History   Problem Relation Age of Onset    Heart Disease Father     Heart Attack Father     Hypertension Mother     Elevated Lipids Brother     Elevated Lipids Brother     No Known Problems Sister     Elevated Lipids Brother     No Known Problems Son     No Known Problems Daughter     Anesth Problems Neg Hx        SOCIAL HISTORY:  Social History     Socioeconomic History    Marital status:    Tobacco Use    Smoking status: Never     Passive exposure: Never    Smokeless tobacco: Never   Vaping Use    Vaping Use: Never used   Substance and Sexual Activity    Alcohol use: Yes     Alcohol/week: 2.0 standard drinks     Types: 1 Cans of beer, 1 Shots of liquor per week     Comment: rarely    Drug use: No    Sexual activity: Yes     Social Determinants of Health     Financial Resource Strain: Medium Risk    Difficulty of Paying Living Expenses: Somewhat hard   Food Insecurity: Food Insecurity Present    Worried About [employer] in the Last Year: Never true    Ran Out of Food in the Last Year: Often true       LABORATORY RESULTS:     Labs Latest Ref Rng & Units 1/4/2023 1/4/2023 1/3/2023 1/2/2023 1/1/2023 12/31/2022 12/31/2022   WBC 4.1 - 11.1 K/uL - 13. 4(H) 15. 3(H) 17. 3(H) 20. 8(H) 13. 6(H) -   RBC 4.10 - 5.70 M/uL - 4.55 4.39 4.37 4.51 4.29 -   Hemoglobin 12.1 - 17.0 g/dL - 10. 4(L) 10. 2(L) 10. 0(L) 10. 2(L) 9.7(L) -   Hematocrit 36.6 - 50.3 % - 35. 6(L) 33. 3(L) 33. 8(L) 34. 4(L) 33. 0(L) -   MCV 80.0 - 99.0 FL - 78. 2(L) 75. 9(L) 77. 3(L) 76. 3(L) 76. 9(L) -   Platelets 755 - 584 K/uL - 319 282 305 356 330 -   Lymphocytes 12 - 49 % - 4(L) 5(L) 4(L) - 3(L) -   Monocytes 5 - 13 % - 3(L) 3(L) 3(L) - 5 -   Eosinophils 0 - 7 % - 0 0 0 - 0 -   Basophils 0 - 1 % - 1 1 0 - 0 -   Albumin 3.5 - 5.0 g/dL - 3.6 3.7 4.0 4.3 4.4 -   Calcium 8.5 - 10.1 MG/DL 10. 4(H) 10. 7(H) 10. 2(H) 10. 3(H) 10. 6(H) 10. 5(H) 10. 7(H)   Glucose 65 - 100 mg/dL - 127(H) 110(H) 256(H) 172(H) 315(H) -   BUN 6 - 20 MG/DL - 96(H) 101(H) 105(H) 106(H) 111(H) -   Creatinine 0.70 - 1.30 MG/DL - 1.57(H) 1.65(H) 1.93(H) 2.01(H) 2.30(H) -   Sodium 136 - 145 mmol/L - 135(L) 134(L) 137 138 133(L) -   Potassium 3.5 - 5.1 mmol/L - 4.4 4.4 4.6 4.3 3.6 -   TSH 0.36 - 3.74 uIU/mL - - - - - - -   PSA 0.01 - 4.0 ng/mL - - - - - - -   Some recent data might be hidden     Lab Results   Component Value Date/Time    TSH 2.12 12/27/2022 02:36 PM    TSH 4.80 (H) 12/06/2022 03:53 AM    TSH 5.39 (H) 10/12/2022 09:10 AM    TSH 3.53 02/03/2022 11:47 AM    TSH 5.790 (H) 11/21/2019 04:45 PM    TSH 3.08 06/22/2018 01:53 PM    TSH 4.250 05/26/2015 09:43 AM       ALLERGY:  No Known Allergies     CURRENT MEDICATIONS:    Current Facility-Administered Medications:     digoxin (LANOXIN) tablet 0.125 mg, 0.125 mg, Oral, DAILY, Colleen Astorga NP, 0.125 mg at 01/04/23 0909    DOBUTamine (DOBUTREX) 500 mg/250 mL (2,000 mcg/mL) infusion, 5 mcg/kg/min, IntraVENous, CONTINUOUS, Heidi Chavez MD, Last Rate: 7.4 mL/hr at 01/03/23 1920, 5 mcg/kg/min at 01/03/23 1920    insulin glargine (LANTUS) injection 48 Units, 48 Units, SubCUTAneous, DAILY, Solis Redmond MD, 48 Units at 01/04/23 0908    insulin lispro (HUMALOG) injection 5 Units, 5 Units, SubCUTAneous, TID WITH MEALS, Solis Redmond MD, 5 Units at 01/04/23 0908    mirtazapine (REMERON) tablet 7.5 mg, 7.5 mg, Oral, QHS, Eula Redmond MD, 7.5 mg at 01/03/23 2139    albuterol-ipratropium (DUO-NEB) 2.5 MG-0.5 MG/3 ML, 3 mL, Nebulization, Q6HWA RT, Eula Redmond MD, 3 mL at 01/04/23 2215    clopidogreL (PLAVIX) tablet 75 mg, 75 mg, Oral, DAILY, Jono Mccormick NP, 75 mg at 01/04/23 0909    [Held by provider] bumetanide (BUMEX) injection 2 mg, 2 mg, IntraVENous, Q12H, Desire Dodd MD, 2 mg at 01/03/23 0837    [Held by provider] potassium chloride SR (KLOR-CON 10) tablet 40 mEq, 40 mEq, Oral, BID, Gianni MOLINA MD, 40 mEq at 01/03/23 0838    pantoprazole (PROTONIX) tablet 40 mg, 40 mg, Oral, ACB, Armando Garces, , 40 mg at 01/04/23 0702    hydrocortisone Sod Succ (PF) (SOLU-CORTEF) injection 50 mg, 50 mg, IntraVENous, Q12H, Colleen Astorga NP, 50 mg at 01/04/23 0909    heparin (porcine) injection 5,000 Units, 5,000 Units, SubCUTAneous, Q12H, [facility], , 5,000 Units at 01/04/23 2878    QUEtiapine (SEROquel) tablet 50 mg, 50 mg, Oral, QHS, [facility], 50 mg at 01/03/23 2139    lidocaine 4 % patch 1 Patch, 1 Patch, TransDERmal, Q24H, Vince Cruz MD, 1 Patch at 12/31/22 1643    balsam peru-castor oiL (VENELEX) ointment, , Topical, BID, Berna Fuchs MD, Given at 01/03/23 1722    alteplase (CATHFLO) 1 mg in sterile water (preservative free) 1 mL injection, 1 mg, InterCATHeter, PRN, Vince Cruz MD    bacitracin 500 unit/gram packet 1 Packet, 1 Packet, Topical, PRN, Radha MOLINA MD    glucose chewable tablet 16 g, 4 Tablet, Oral, PRN, Radha MOLINA MD    glucagon (GLUCAGEN) injection 1 mg, 1 mg, IntraMUSCular, PRN, Walker Rivera MD    dextrose 10 % infusion 0-250 mL, 0-250 mL, IntraVENous, PRN, Vince Cruz MD    insulin lispro (HUMALOG) injection, , SubCUTAneous, AC&HS, [facility], 2 Units at 01/04/23 0908    senna-docusate (PERICOLACE) 8.6-50 mg per tablet 1 Tablet, 1 Tablet, Oral, BID PRN, Vince Cruz MD, 1 Tablet at 12/26/22 4775    bisacodyL (DULCOLAX) suppository 10 mg, 10 mg, Rectal, QHS PRN, Radha MOLINA MD    sodium chloride (NS) flush 5-40 mL, 5-40 mL, IntraVENous, Q8H, Walker Aponte MD, 10 mL at 01/04/23 0704    sodium chloride (NS) flush 5-40 mL, 5-40 mL, IntraVENous, PRN, Vince Cruz MD, 10 mL at 12/28/22 0845    acetaminophen (TYLENOL) tablet 650 mg, 650 mg, Oral, Q6H PRN, 650 mg at 12/26/22 1714 **OR** acetaminophen (TYLENOL) suppository 650 mg, 650 mg, Rectal, Q6H PRN, Walker Rivera MD    polyethylene glycol (MIRALAX) packet 17 g, 17 g, Oral, DAILY PRN, Vince Cruz MD, 17 g at 12/26/22 0649    ondansetron (ZOFRAN ODT) tablet 4 mg, 4 mg, Oral, Q8H PRN **OR** ondansetron (ZOFRAN) injection 4 mg, 4 mg, IntraVENous, Q6H PRN, Radha MOLINA MD, 4 mg at 12/24/22 0849    aspirin delayed-release tablet 81 mg, 81 mg, Oral, DAILY, Ranjit Pearson MD, 81 mg at 01/04/23 0909    ipratropium (ATROVENT) 21 mcg (0.03 %) nasal spray 2 Spray, 2 Spray, Both Nostrils, Q12H, Ranjit Pearson MD, 2 [place] at 01/03/23 2139    tamsulosin (FLOMAX) capsule 0.4 mg, 0.4 mg, Oral, DAILY, Ranjit Pearson MD, 0.4 mg at 01/04/23 0909    sodium chloride (NS) flush 5-40 mL, 5-40 mL, IntraVENous, PRN, Noman Foley MD    PATIENT CARE TEAM:  Patient Care Team:  Erin Broderick MD as PCP - General (Family Medicine)  Erin Broderick MD as PCP - [facility]  Jaime Eason MD (Cardiovascular Disease Physician)  Ivonne Tee MD (Gastroenterology)  Krishna Bell MD (Cardiothoracic Surgery)  Fausto Sofia MD (Cardiovascular Disease Physician)  Paola Fleming MD (Nephrology)  Darwin Gill RN as Care Transitions Nurse  Tala Rodgers MD (Pulmonary Disease)     Thank you for allowing me to participate in this patient's care.     Josue Umanzor MD   [address] [place] [address], [address]  Phone: [phone]

## 2023-01-04 NOTE — WOUND CARE
WOCN Note:     New consult for dark right heel. Chart shows:  Admitted on 12/22/22. Admitted for sepsis, cardiogenic shock, respiratory failure. History of MI, CABG x3, DM, CHF. Assessment:   Conversational with multiple family members in room. Able to turn independently onto left side. Has a Vasquez. left heel intact and without erythema. Heels offloaded with pillows. Buttocks and sacrum intact without erythema;sacral foam dressing in use. 1. POA right heel deep tissue injury  3 x 3 x 0 cm  100% non-blanching purple; no blistering or skin sliding or induration or fluctuance  Tx: venelex already in use and reapplied; heels offloaded with pillows    Wound Recommendations:    Continue venelex as ordered twice daily to heels    PI Prevention:  Turn/reposition approximately every 2 hours  Offload heels with heels hanging off end of pillow at all times while in bed. Sacral Foam dressing: lift to assess regularly; change as needed. Discontinue if incontinence is frequently soiling dressing. Low Air Loss mattress: Use only flat sheet and one incontinence pad. No new information to update provider.      Transition of Care: Plan to follow weekly and as needed while admitted to hospital.      Christopher Pro, GALILEON, RN, [facility]  Certified Wound, Ostomy, Continence Nurse  office [phone]  Available via [facility] [place]

## 2023-01-04 NOTE — PROGRESS NOTES
Transitions of Care Plan  RUR: 24% - high  Clinical Update: hypotensive during therapy yesterday; dobutamine - attempting to wean  Consults: [facility]; Nephrology; Wound Care; Therapy  Baseline: independent; resides w wife  Barriers to Discharge: medical  Disposition: SNF - see below for details  Estimated Discharge Date: 2+ days    CM reviewed chart for clinical update. Noted hypotensive episode during therapy yesterday. Therapy continues to recommend IPR. Patient on dobutamine - likely will need SNF that accepts inotrope. CCU CM received choices of SNF facilities - [facility] in [place] [phone]; [address] at [place]  and [facility] (does not take dobutamine). CM to follow up with facilities if able to take inotrope. Only facility in the [facility] that accepts dobutamine is [facility] - will discuss w family if [place] facilities cannot manage gtt. Also monitoring if patient is weaned off dobutamine.     Disposition:  [facility] - decline - unable to accept inotrope  [facility] - pending - VM left  [facility] - decline - unable to accept inotrope  [facility] - will discuss w family    Joyce Solomon, MPH  Care Manager [facility]  Available via [facility] or

## 2023-01-04 NOTE — PROGRESS NOTES
Called about pt Dr. Srikanth Salazar has been seeing on dobutamine gtt. BP remains on low side. Recommended increasing dobutamine to 5 mcg/kg/min as per Dr. Janes Hammer note today; \"may require increasing dose of dobutamine\" and diuretic has been held.  Spoke with pt's care team.

## 2023-01-04 NOTE — PROGRESS NOTES
[facility] CARDIOLOGY   Care Note                  []Initial visit     [x]Established visit     Patient Name: Bayron Carvajal - TVU:[id number] - LBK:[id number]  Primary Cardiologist: Kun Lang MD  Consulting Cardiologist: Huel Habermann, md, and rounding today Opal Connor MD     Reason for initial visit:  dyspnea, decompensated HF, tachycardia. Patient seen and examined by me with the above nurse practitioner. I personally performed all components of the history, physical, and medical decision making and agree with the assessment and plan with minor modifications as noted. Today the patient continues with improving dyspnea after diuresis, but still requiring dobutamine. No chest pain. General PE  Gen:  NAD  Mental Status - Alert. General Appearance - Not in acute distress. HEENT:  PERRL, no carotid bruits or JVD  Chest and Lung Exam   Inspection: Accessory muscles - No use of accessory muscles in breathing. Auscultation:   Breath sounds: - Normal.   Cardiovascular   Inspection: Jugular vein - Bilateral - Inspection Normal.   Palpation/Percussion:   Apical Impulse: - Normal.   Auscultation: Rhythm - Regular. Heart Sounds - S1 WNL and S2 WNL. No S3 or S4. Murmurs & Other Heart Sounds: Auscultation of the heart reveals - No Murmurs. Peripheral Vascular   Upper Extremity: Inspection - Bilateral - No Cyanotic nailbeds or Digital clubbing. Lower Extremity:   Palpation: Edema - Bilateral - No edema. Abdomen:   Soft, non-tender, bowel sounds are active. Neuro: A&O times 3, CN and motor grossly WNL    Discussed cardiac MRI results with Dr. Carla Gage. PCI is likely to change his clinical course, and recommends medical management at this time. Much improved with treatment of heart failure. Appears euvolemic.   Try to wean dobutamine as tolerated, then as per heart failure can pursue right heart catheterization. BP too low for GDMT. Family concerned about long-term prognosis and disposition at discharge as he has had 3 recent hospitalizations and deteriorates further each time. Discussed concerns with Dr. Sonal Carl who will address with case management, PT and OT. HPI:     CAD with CABG 6/9/2018. NSTEMI in November 2016 and resolved pulmonary HTN. Stent to circumflex an LAD in 2016. Stress echo in 2018 with a drop in BP with exercise and a drop in EF. Cath on 6/22/18 that showed even with a 5F catheter, he dampened with suspected ostial 3 vessel disease. Echo 3/27/2021 = EF 55 to 60% mild AR MR TR LAE MAC  Nuclear 3/18/2021 EF 64% medium size defect apical and inferior lateral reversible ischemia medium defect mid and inferolateral nonreversible appear to be infarction intermediate risk stress test .  PCI 02/28/2022--patent LIMA to LAD, vein graft to distal RCA with severe disease in the native vessels Occluded vein graft to OM 2. Native OM 2 severely diseased along with proximal to mid circumflex as well as distal left main. Overall the vessel is significantly remodeled negatively. Additionally there is significant disease in the first marginal branch which is even smaller as well as AV groove branch right at the takeoff of OM2. Single stent 2.25 x 28 mm Xience was placed extending from the OM 2 into the circumflex into the distal left main and sequentially dilated with 2.25 noncompliant, 2 5 noncompliant, 3 oh noncompliant and 3 5 noncompliant to perform multilevel POT to manage multiple bifurcations on the way. Atherectomy was considered but given small size of the vessels, was not deemed to be absolutely safe for the obtuse marginal lesion. Overall stent expanded fairly well related to the size of the vessels.  Normal LVEDP  Nuclear stress October 27, 2022 test showed ischemia similar to 3/15/2021 with a medium size defect in the mid to distal inferolateral and anterolateral segments in the circumflex territory he had septal dyskinesia with an EF of 39%. He had a fixed apical infarct  Echocardiogram October 27, 2022 showed an EF of 45 to 50% TAPSE at 1.2 showing reduced RV function sclerosis of the aortic valve with stenosis that was very mild with a valve area of mean of 7 mmHg and HUY of 1.4 cm². The mitral was thickened with restricted mobility and mild MR.      SUBJECTIVE:    Remains on low dose dobutamine - increased overnight to 5mcg/kg. S/P cardiac MRI with some viability. He has done well with diuresis with primary coronary vessels open. Dr Marycarmen Siddiqui does not believe that PCI will improve symptoms or outcome. Blood pressure is marginal and may require increasing dose of dobutamine     Assessment and Plan       1. Dyspnea/decompensated HFrEF- EF 20%. Yesterday dobutamine was reduced down to 2 mcg/kg/min + IV bumex gtt. Weight stable. Creatinine stable, LFTs slightly decreased. Rapid deterioration in LV function with overall WMA on recent echo appears not to be in teritorry of ischemia suggesting alternative mechanism for  cardiomyopathy (stress SMP vs myocarditis). Blood pressures are marginal and at this point may need larger dose of dobutamine and may be dobutamine is dependent for foreseeable few weeks. Recommend taper steroid. 2  CAD - sp CABG in 2018, cath 9/8/2021 open LIMA, PCI to OM 2 all the way up to the left main 2/28/2022. Cath 12/6/2022 open LIMA and RCA graft some in-stent restenosis in the long graft from the left main to OM 2 but not severe and the OM1 was more severely diseased in the small vessel. Interventional cardiology felt no reasonable vessel to intervene and recommended medical management. The OM disease is consistent with a lateral wall ischemia demonstrated on prior nuclear stress test.  Continue aspirin, plavix. Holding statin for elevated LFTs. Continue to monitor CBC for anemia. Hgb up to 9.7 currently.   Microcytic indices. 3.  Aortic stenosis mild mean gradient of 7 nn Hg, HUY 1.4 by echo 11/27/2022 -mild , murmur     4. Diabetes mellitus type 2- On insulin, management per IM     5. PAD: S/P Right SFA PTCA 12/6/22. Continue ASA, plavix. Holding statin for elevated LFTs. Needs follow up with Dr Doug Eller in February with MARIANELA/Dopplers    6. 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 DNR status in place          ____________________________________________________________    Cardiac testing    MRI Results (most recent):  Results from Hospital Encounter encounter on 12/22/22    MRI CARDIAC MORPH FUNC W WO CONT    Narrative  CARDIOVASCULAR MRI    INDICATION: suspected myocarditis. Dr. Xiomara Oconnor aware of need for MRI and planned  for Tuesday at [facility]:    CPT Codes: 00626    SCANS PERFORMED:  Spin Echo: Axial.  FIESTA/SSFP  LGE    Contrast Agent: ProHance. Contrast Dose: 15ml. CLINICAL DATA:  HR = 103/min, BP = 81/61mmHg,  HT = 5 foot 9inc, WT = 108lb. Impression  1. Normal left ventricular cavity size. Severe left ventricular systolic  dysfunction. Severe hypokinesis of the base to mid and distal anterior wall,  anteroseptal wall, anteroapical wall, apical septal wall and apex. Mild  hypokinesis of the lateral wall. 3-D LVEF 23%. 2. Normal right ventricular size and systolic function. 3. Sclerotic aortic valve leaflets. Mild to moderate aortic valve stenosis. Aortic valve area is 1.3 sq cm by planimetry. 4. On T2 W imaging there is no significant myocardial edema seen. On EGE and LGE  study, there is patchy subendocardial infarct involving LAD territory including  mid to distal anterior wall, anteroseptal wall, anteroapical wall, apex, apical  septal wall involving less than 25% thickness of the myocardial wall with  thinning of the apex and apical septal wall. There is medium grade myocardial  viability of these walls.  There is a small to medium size subendocardial infarct  of the basal inferior wall. There is mild patchy subepicardial enhancement of  the lateral wall suggestive of nonischemic etiology. There is no features of  infiltrative sarcoidosis or cardiac amyloidosis. 5. Normal pleura and pericardium. There is no significant effusions. Pericardium:  Normal. (<3.5mm)  Pericardial Effusion:  None. Pleural Effusion:  None. VOLUMETRIC DATA:    LVEDVI:  71 ml/m2 (Normal 47-92 mL/sq-m)  LVESVI:  55 ml/m2 (Normal 13-30 mL/sq-m)  LVSVI:  17 ml/m2 (Normal 32-62 mL/sq-m)  LVMI:  59 g/m2      12/22/22    ECHO ADULT COMPLETE 12/26/2022 12/26/2022    Interpretation Summary    Left Ventricle: Severely reduced left ventricular systolic function with a visually estimated EF of 25 - 30%. Left ventricle size is normal. Normal wall thickness. There are regional wall motion abnormalities. Grade II diastolic dysfunction with increased LAP. Right Ventricle: Moderately reduced systolic function. TAPSE is abnormal. TAPSE is 1.1 cm. Aortic Valve: Mild stenosis of the aortic valve. AV peak gradient is 13 mmHg. AV peak velocity is 1.8 m/s. Mitral Valve: Not well visualized. Moderate annular calcification at the posterior leaflet of the mitral valve. Mild to moderate regurgitation. Tricuspid Valve: Mildly elevated RVSP. Left Atrium: Left atrium is moderately dilated. Signed by: Concepción Gutierrez MD on 12/26/2022  3:13 PM          10/27/22    NUCLEAR CARDIAC STRESS TEST 10/27/2022 11/1/2022    Interpretation Summary    Nuclear Findings: The study is positive for myocardial ischemia. The defect appears to be ischemia. The areas of ischemia are similar to 3/15/2021 study. Nuclear Findings: There is a moderate severity left ventricular stress perfusion defect that is medium in size present in the mid to distal inferolateral and anterolateral segment(s) that is reversible. The defect is consistent with abnormal perfusion in the LCx territory.            There is normal wall motion in the defect area. The defect appears to be probable ischemia. There is a moderate severity second left ventricular stress perfusion defect. The defect appears to be infarction. Nuclear Findings: Abnormal left ventricular systolic function post-stress. Septal dyskinesis. Post-stress ejection fraction is 39%. ECG: Resting ECG demonstrates normal sinus rhythm. ECG: Stress ECG was negative for ischemia. Stress Test: A pharmacological stress test was performed using lexiscan. Blood pressure demonstrated a normal response and heart rate demonstrated a normal response to stress. The patient's heart rate recovery was normal. The patient reported no symptoms during the stress test.    Signed by: Clive Carter MD on 10/27/2022  4:45 PM    02/28/22    CARDIAC PROCEDURE 02/28/2022 2/28/2022    Conclusion  Findings  1. Severe native multivessel coronary disease  2. Patent LIMA to LAD, vein graft to distal RCA with severe disease in the native vessels  3. Occluded vein graft to OM 2. Native OM 2 severely diseased along with proximal to mid circumflex as well as distal left main. Overall the vessel is significantly remodeled negatively. Additionally there is significant disease in the first marginal branch which is even smaller as well as AV groove branch right at the takeoff of OM2. Single stent 2.25 x 28 mm Xience was placed extending from the OM 2 into the circumflex into the distal left main and sequentially dilated with 2.25 noncompliant, 2 5 noncompliant, 3 oh noncompliant and 3 5 noncompliant to perform multilevel POT to manage multiple bifurcations on the way. Atherectomy was considered but given small size of the vessels, was not deemed to be absolutely safe for the obtuse marginal lesion. Overall stent expanded fairly well related to the size of the vessels. 4.  Normal LVEDP    Access right radial: Small vessel, tortuous} brachiocephalic  Contrast 65 cc    Recommendations  1.   Plavix based dual antiplatelet therapy  2. Guideline directed medical therapy for secondary prevention of coronary events    Signed by: Hafsa Callejas MD on 2/28/2022  4:22 PM    Most recent HS troponins:  Recent Labs     01/04/23  0231   TROPHS 12*         Review of Systems    [x]All other systems reviewed and all negative except as written in HPI    [] Patient unable to provide secondary to condition         Past Medical History:   Diagnosis Date    CAD (coronary artery disease) 11/10/2016    NSTEMI & 2 stents    Deafness 10/28/2012    DM (diabetes mellitus) ([facility] [address]     Elevated cholesterol     Hypertension     NSTEMI (non-ST elevated myocardial infarction) ([facility] [address] 11/10/2016     Past Surgical History:   Procedure Laterality Date    COLONOSCOPY N/A 6/28/2018    COLONOSCOPY performed by Piotr Davalos MD at [address] [place] [address] St  11/11/2016    2 stents     Social Hx:  reports that he has never smoked. He has never been exposed to tobacco smoke. He has never used smokeless tobacco. He reports current alcohol use of about 2.0 standard drinks per week. He reports that he does not use drugs. Family Hx: family history includes Elevated Lipids in his brother, brother, and brother; Heart Attack in his father; Heart Disease in his father; Hypertension in his mother; No Known Problems in his daughter, sister, and son.   No Known Allergies       OBJECTIVE:  Wt Readings from Last 3 Encounters:   01/04/23 115 lb 11.9 oz (52.5 kg)   12/19/22 129 lb (58.5 kg)   12/16/22 126 lb 8.7 oz (57.4 kg)       Intake/Output Summary (Last 24 hours) at 1/4/2023 0854  Last data filed at 1/4/2023 0302  Gross per 24 hour   Intake 327.21 ml   Output 2620 ml   Net -2292.79 ml           Physical Exam    Vitals:   Vitals:    01/04/23 0400 01/04/23 0551 01/04/23 0715 01/04/23 0823   BP:   114/64    Pulse: 93 90 89    Resp:   12    Temp:   98.5 °F (36.9 °C)    SpO2:   100% 100%   Weight:       Height:         Telemetry: normal sinus rhythm  /64 (BP 1 Location: Left upper arm, BP Patient Position: At rest)   Pulse 89   Temp 98.5 °F (36.9 °C)   Resp 12   Ht 5' 9\" (1.753 m)   Wt 115 lb 11.9 oz (52.5 kg)   SpO2 100%   BMI 17.09 kg/m²   General:    Alert, cooperative, no distress. Psychiatric:    Normal Mood and affect    Eye/ENT:      Pupils equal, No asymmetry, Conjunctival pink. Able to hear voice at normal amplitude   Lungs:      Visibly symmetric chest expansion, No palpable tenderness. Clear to auscultation bilaterally. Heart[de-identified]    Regular rate and rhythm, S1, S2 normal, no murmur, click, rub or gallop. No JVD, Normal palpable peripheral pulses. No cyanosis   Abdomen:     Soft, non-tender. Bowel sounds normal. No masses,  No      organomegaly. Extremities:   Extremities normal, atraumatic, no edema. Neurologic:   CN II-XII grossly intact. No gross focal deficits           Data Review:     Radiology:   XR Results (most recent):  Results from Hospital Encounter encounter on 12/22/22    XR CHEST PORT    Narrative  INDICATION:  Rule out PNA    EXAM: Chest single view. COMPARISON: 12/31/2022. FINDINGS: A single frontal view of the chest at 0953 hours shows stable  bibasilar atelectasis and interstitial prominence. No new focal infiltrate. Small right pleural effusion stable. .  The heart, mediastinum and pulmonary  vasculature are stable status post median sternotomy . The bony thorax is  unremarkable for age. .    Impression  Stable bibasilar atelectasis and interstitial prominence with small right  pleural effusion. .  . CT Results (most recent):  Results from Hospital Encounter encounter on 12/22/22    CT ABD PELV WO CONT    Narrative  EXAM: CT ABD PELV WO CONT    INDICATION: rlq abdominal pain    COMPARISON: 5/3/2014    IV CONTRAST: None. ORAL CONTRAST: None    TECHNIQUE:  Thin axial images were obtained through the abdomen and pelvis. Coronal and  sagittal reformats were generated.  CT dose reduction was achieved through use of  a standardized protocol tailored for this examination and automatic exposure  control for dose modulation. The absence of intravenous contrast material reduces the sensitivity for  evaluation of the vasculature and solid organs. FINDINGS:  LOWER THORAX: Small bilateral pleural effusions. Partial collapse bases  LIVER: No mass. BILIARY TREE: Gallstones. No evidence of acute cholecystitis CBD is not dilated. SPLEEN: within normal limits. PANCREAS: No focal abnormality. ADRENALS: 19 mm right adrenal nodule unchanged,  KIDNEYS/URETERS: Mild bilateral hydronephrosis. No stone  STOMACH: Unremarkable. SMALL BOWEL: No dilatation or wall thickening. COLON: No dilatation or wall thickening. APPENDIX: Surgically absent  PERITONEUM: No ascites or pneumoperitoneum. RETROPERITONEUM: No lymphadenopathy or aortic aneurysm. Extensive vascular  calcifications  REPRODUCTIVE ORGANS: Mildly enlarged  URINARY BLADDER: Massive distention  BONES: No destructive bone lesion. ABDOMINAL WALL: Small umbilical hernia containing fat. ADDITIONAL COMMENTS: N/A    Impression  1. Bladder is massively distended with mild bilateral hydronephrosis. May  indicate bladder outlet obstruction. Prostate is mildly enlarged  2. Gallstones. No acute cholecystitis  3. Small bilateral pleural effusions    MRI Results (most recent):  Results from Hospital Encounter encounter on 12/22/22    MRI CARDIAC MORPH FUNC W WO CONT    Narrative  CARDIOVASCULAR MRI    INDICATION: suspected myocarditis. Dr. Tabitha Jansen aware of need for MRI and planned  for Tuesday at [facility]:    CPT Codes: 74105    SCANS PERFORMED:  Spin Echo: Axial.  FIESTA/SSFP  LGE    Contrast Agent: ProHance. Contrast Dose: 15ml. CLINICAL DATA:  HR = 103/min, BP = 81/61mmHg,  HT = 5 foot 9inc, WT = 108lb. Impression  1. Normal left ventricular cavity size. Severe left ventricular systolic  dysfunction.  Severe hypokinesis of the base to mid and distal anterior wall,  anteroseptal wall, anteroapical wall, apical septal wall and apex. Mild  hypokinesis of the lateral wall. 3-D LVEF 23%. 2. Normal right ventricular size and systolic function. 3. Sclerotic aortic valve leaflets. Mild to moderate aortic valve stenosis. Aortic valve area is 1.3 sq cm by planimetry. 4. On T2 W imaging there is no significant myocardial edema seen. On EGE and LGE  study, there is patchy subendocardial infarct involving LAD territory including  mid to distal anterior wall, anteroseptal wall, anteroapical wall, apex, apical  septal wall involving less than 25% thickness of the myocardial wall with  thinning of the apex and apical septal wall. There is medium grade myocardial  viability of these walls. There is a small to medium size subendocardial infarct  of the basal inferior wall. There is mild patchy subepicardial enhancement of  the lateral wall suggestive of nonischemic etiology. There is no features of  infiltrative sarcoidosis or cardiac amyloidosis. 5. Normal pleura and pericardium. There is no significant effusions. Pericardium:  Normal. (<3.5mm)  Pericardial Effusion:  None. Pleural Effusion:  None. VOLUMETRIC DATA:    LVEDVI:  71 ml/m2 (Normal 47-92 mL/sq-m)  LVESVI:  55 ml/m2 (Normal 13-30 mL/sq-m)  LVSVI:  17 ml/m2 (Normal 32-62 mL/sq-m)  LVMI:  59 g/m2      Recent Labs     01/04/23 0231   CPK 87     Recent Labs     01/04/23 0231 01/03/23 0233   * 134*   K 4.4 4.4    98   CO2 26 27   BUN 96* 101*   CREA 1.57* 1.65*   * 110*   PHOS 3.8 4.8*   CA 10.4*  10.7* 10.2*     Recent Labs     01/04/23 0231 01/03/23 0233   WBC 13.4* 15.3*   HGB 10.4* 10.2*   HCT 35.6* 33.3*    282     Recent Labs     01/04/23 0231 01/03/23 0233   * 171*       No results for input(s): CHOL, LDLC in the last 72 hours.     No lab exists for component: TGL, HDLC,  HBA1C  No results for input(s): CRP, TSH, TSHEXT, TSHEXT in the last 72 hours.    No lab exists for component: ESR      Current meds:    Current Facility-Administered Medications:     digoxin (LANOXIN) tablet 0.125 mg, 0.125 mg, Oral, DAILY, Colleen Astorga NP, 0.125 mg at 01/03/23 1035    DOBUTamine (DOBUTREX) 500 mg/250 mL (2,000 mcg/mL) infusion, 5 mcg/kg/min, IntraVENous, CONTINUOUS, Ann Welsh MD, Last Rate: 7.4 mL/hr at 01/03/23 1920, 5 mcg/kg/min at 01/03/23 1920    insulin glargine (LANTUS) injection 48 Units, 48 Units, SubCUTAneous, DAILY, Ruben Ortiz MD, 48 Units at 01/03/23 0838    insulin lispro (HUMALOG) injection 5 Units, 5 Units, SubCUTAneous, TID WITH MEALS, Ruben Ortiz MD, 5 Units at 01/03/23 1722    mirtazapine (REMERON) tablet 7.5 mg, 7.5 mg, Oral, QHS, Lashae Ortiz MD, 7.5 mg at 01/03/23 2139    albuterol-ipratropium (DUO-NEB) 2.5 MG-0.5 MG/3 ML, 3 mL, Nebulization, Q6HWA RT, Lashae Ortiz MD, 3 mL at 01/04/23 9841    clopidogreL (PLAVIX) tablet 75 mg, 75 mg, Oral, DAILY, Melo Mccormick NP, 75 mg at 01/03/23 0838    [Held by provider] bumetanide (BUMEX) injection 2 mg, 2 mg, IntraVENous, Q12H, Desire Dodd MD, 2 mg at 01/03/23 0837    [Held by provider] potassium chloride SR (KLOR-CON 10) tablet 40 mEq, 40 mEq, Oral, BID, Leandra MOLINA MD, 40 mEq at 01/03/23 0838    pantoprazole (PROTONIX) tablet 40 mg, 40 mg, Oral, ACB, Olvin Jerez DO, 40 mg at 01/04/23 0702    hydrocortisone Sod Succ (PF) (SOLU-CORTEF) injection 50 mg, 50 mg, IntraVENous, Q12H, Colleen Astorga, NP, 50 mg at 01/03/23 2139    heparin (porcine) injection 5,000 Units, 5,000 Units, SubCUTAneous, Q12H, Olvin Jerez DO, 5,000 Units at 01/03/23 2139    QUEtiapine (SEROquel) tablet 50 mg, 50 mg, Oral, QHS, David Aguilar, DO, 50 mg at 01/03/23 2139    lidocaine 4 % patch 1 Patch, 1 Patch, TransDERmal, Q24H, Jean Alcantara MD, 1 Patch at 12/31/22 1643    balsam peru-castor oiL (VENELEX) ointment, , Topical, BID, Amy Hardy MD, Given at 01/03/23 1722    alteplase (CATHFLO) 1 mg in sterile water (preservative free) 1 mL injection, 1 mg, InterCATHeter, PRN, Dhara Hendrickson MD    bacitracin 500 unit/gram packet 1 Packet, 1 Packet, Topical, PRN, Santhosh MOLINA MD    glucose chewable tablet 16 g, 4 Tablet, Oral, PRN, Santhosh MOLINA MD    glucagon (GLUCAGEN) injection 1 mg, 1 mg, IntraMUSCular, PRN, Dhara Hendrickson MD    dextrose 10 % infusion 0-250 mL, 0-250 mL, IntraVENous, PRN, Santhosh MOLINA MD    insulin lispro (HUMALOG) injection, , SubCUTAneous, AC&HS, Luci Ferrer DO, 9 Units at 01/03/23 2139    senna-docusate (Kari Sails) 8.6-50 mg per tablet 1 Tablet, 1 Tablet, Oral, BID PRN, Dhara Hendrickson MD, 1 Tablet at 12/26/22 1195    bisacodyL (DULCOLAX) suppository 10 mg, 10 mg, Rectal, QHS PRN, Santhosh MOLINA MD    sodium chloride (NS) flush 5-40 mL, 5-40 mL, IntraVENous, Q8H, Walker Aponte MD, 10 mL at 01/04/23 0704    sodium chloride (NS) flush 5-40 mL, 5-40 mL, IntraVENous, PRN, Dhara Hendrickson MD, 10 mL at 12/28/22 0845    acetaminophen (TYLENOL) tablet 650 mg, 650 mg, Oral, Q6H PRN, 650 mg at 12/26/22 1714 **OR** acetaminophen (TYLENOL) suppository 650 mg, 650 mg, Rectal, Q6H PRN, Dhara Hendrickson MD    polyethylene glycol (MIRALAX) packet 17 g, 17 g, Oral, DAILY PRN, Dhara Hendrickson MD, 17 g at 12/26/22 0649    ondansetron (ZOFRAN ODT) tablet 4 mg, 4 mg, Oral, Q8H PRN **OR** ondansetron (ZOFRAN) injection 4 mg, 4 mg, IntraVENous, Q6H PRN, Santhosh MOLINA MD, 4 mg at 12/24/22 0849    aspirin delayed-release tablet 81 mg, 81 mg, Oral, DAILY, Aleksandr Cook MD, 81 mg at 01/03/23 0838    ipratropium (ATROVENT) 21 mcg (0.03 %) nasal spray 2 Spray, 2 Spray, Both Nostrils, Q12H, Ivonne Pearson MD, [address] [place] at 01/03/23 2139    tamsulosin (FLOMAX) capsule 0.4 mg, 0.4 mg, Oral, DAILY, Ranjit Pearson MD, 0.4 mg at 01/03/23 0838    sodium chloride (NS) flush 5-40 mL, 5-40 mL, IntraVENous, PRN, MD Naty Lewis, ANP  [facility], [address] [address]  [place]  [phone]      Crista Sawyer MD

## 2023-01-04 NOTE — PROGRESS NOTES
NAME: Catherine Cardoza        :  1950        MRN:  [id number]         Assessment:    TANNER on CKD stage 3b, in the setting of urinary retention, poor hemodynamics and low EF at risk for CRS  Urinary retention/hydro s/p donnelly  A on chronic biventricular CHF; EF 20-25%, 23% by cMRI  Anemia  Borderline hypotension  Hypercalcemia started on high dose po vit D on ; off now. High Mg     TANNER resolved. CR back to baseline at 1.57 today. Calcium still borderline high. Partly contributed by relative immobility. He is not on any magnesium supplements or laxatives. He has been diuresed aggressively for more than a week. IV diuretics on hold since yesterday. I also held potassium, while we are holding diuretics. He underwent cardiac MRI on 1/3/2023  SPEP is negative for M spike. Serum FLC ratio is mildly elevated at 2.1, which is likely due to CKD    Plan:  Continue to hold IV diuretics  Monitor weight and edema. Resume diuretics orally in 1 to 2 days  Continue IV dobutamine per cardiology/CHF team.  Attempts to wean off has been unsuccessful   continue OFF vit D; encourage increase mobility as possible  Being worked up and considered for possible LVAD  Donnelly reinserted and to be left in for now    Subjective:   Resting. Feels okay. Wife and brother at the bedside. Still weak. Review of Systems: Denies nausea, vomiting, chest pain. No SOB    Objective:     VITALS:   Last 24hrs VS reviewed since prior progress note.  Most recent are:  Visit Vitals  BP (!) 107/57 (BP 1 Location: Left upper arm, BP Patient Position: At rest)   Pulse 100   Temp 98.4 °F (36.9 °C)   Resp 18   Ht 5' 9\" (1.753 m)   Wt 52.5 kg (115 lb 11.9 oz)   SpO2 100%   BMI 17.09 kg/m²       Intake/Output Summary (Last 24 hours) at 2023 1425  Last data filed at 2023 1228  Gross per 24 hour   Intake 1417.01 ml   Output 3220 ml   Net -1802.99 ml Telemetry Reviewed:     PHYSICAL EXAM:  General: NAD  Clear  Regular rate rhythm  No significant edema  AOx3      Lab Data Reviewed: (see below)    Medications Reviewed: (see below)    PMH/SH reviewed - no change compared to H&P  ________________________________________________________________________  Care Plan discussed with:  Patient y    Family  y    RN     Care Manager                    Consultant:          Comments   >50% of visit spent in counseling and coordination of care       ________________________________________________________________________  Linsey Chairez MD     Procedures: see electronic medical records for all procedures/Xrays and details which  were not copied into this note but were reviewed prior to creation of Plan. LABS:  Recent Labs     01/04/23 0231 01/03/23 0233   WBC 13.4* 15.3*   HGB 10.4* 10.2*   HCT 35.6* 33.3*    282       Recent Labs     01/04/23 0231 01/03/23 0233 01/02/23 0351   * 134* 137   K 4.4 4.4 4.6    98 101   CO2 26 27 25   BUN 96* 101* 105*   CREA 1.57* 1.65* 1.93*   * 110* 256*   CA 10.4*  10.7* 10.2* 10.3*   MG 2.8* 2.7* 2.8*   PHOS 3.8 4.8* 4.8*       Recent Labs     01/04/23 0231 01/03/23 0233 01/02/23 0351   * 171* 173*   TP 7.8 7.5 7.6   ALB 3.6 3.7 4.0   GLOB 4.2* 3.8 3.6       No results for input(s): INR, PTP, APTT, INREXT, INREXT in the last 72 hours.    Recent Labs     01/04/23  0231   TIBC 277   PSAT 29   FERR 463*        Lab Results   Component Value Date/Time    Folate 10.5 07/03/2018 09:24 AM         MEDICATIONS:  Current Facility-Administered Medications   Medication Dose Route Frequency    DOBUTamine (DOBUTREX) 500 mg/250 mL (2,000 mcg/mL) infusion  4 mcg/kg/min IntraVENous CONTINUOUS    hydrocortisone Sod Succ (PF) (SOLU-CORTEF) injection 25 mg  25 mg IntraVENous Q12H    digoxin (LANOXIN) tablet 0.125 mg  0.125 mg Oral DAILY    insulin glargine (LANTUS) injection 48 Units  48 Units SubCUTAneous DAILY insulin lispro (HUMALOG) injection 5 Units  5 Units SubCUTAneous TID WITH MEALS    mirtazapine (REMERON) tablet 7.5 mg  7.5 mg Oral QHS    albuterol-ipratropium (DUO-NEB) 2.5 MG-0.5 MG/3 ML  3 mL Nebulization Q6HWA RT    clopidogreL (PLAVIX) tablet 75 mg  75 mg Oral DAILY    [Held by provider] bumetanide (BUMEX) injection 2 mg  2 mg IntraVENous Q12H    [Held by provider] potassium chloride SR (KLOR-CON 10) tablet 40 mEq  40 mEq Oral BID    pantoprazole (PROTONIX) tablet 40 mg  40 mg Oral ACB    heparin (porcine) injection 5,000 Units  5,000 Units SubCUTAneous Q12H    QUEtiapine (SEROquel) tablet 50 mg  50 mg Oral QHS    lidocaine 4 % patch 1 Patch  1 Patch TransDERmal Q24H    balsam peru-castor oiL (VENELEX) ointment   Topical BID    alteplase (CATHFLO) 1 mg in sterile water (preservative free) 1 mL injection  1 mg InterCATHeter PRN    bacitracin 500 unit/gram packet 1 Packet  1 Packet Topical PRN    glucose chewable tablet 16 g  4 Tablet Oral PRN    glucagon (GLUCAGEN) injection 1 mg  1 mg IntraMUSCular PRN    dextrose 10 % infusion 0-250 mL  0-250 mL IntraVENous PRN    insulin lispro (HUMALOG) injection   SubCUTAneous AC&HS    senna-docusate (PERICOLACE) 8.6-50 mg per tablet 1 Tablet  1 Tablet Oral BID PRN    bisacodyL (DULCOLAX) suppository 10 mg  10 mg Rectal QHS PRN    sodium chloride (NS) flush 5-40 mL  5-40 mL IntraVENous Q8H    sodium chloride (NS) flush 5-40 mL  5-40 mL IntraVENous PRN    acetaminophen (TYLENOL) tablet 650 mg  650 mg Oral Q6H PRN    Or    acetaminophen (TYLENOL) suppository 650 mg  650 mg Rectal Q6H PRN    polyethylene glycol (MIRALAX) packet 17 g  17 g Oral DAILY PRN    ondansetron (ZOFRAN ODT) tablet 4 mg  4 mg Oral Q8H PRN    Or    ondansetron (ZOFRAN) injection 4 mg  4 mg IntraVENous Q6H PRN    aspirin delayed-release tablet 81 mg  81 mg Oral DAILY    ipratropium (ATROVENT) 21 mcg (0.03 %) nasal spray 2 Spray  2 Spray Both Nostrils Q12H    tamsulosin (FLOMAX) capsule 0.4 mg  0.4 mg Oral DAILY    sodium chloride (NS) flush 5-40 mL  5-40 mL IntraVENous PRN

## 2023-01-05 LAB
ALBUMIN SERPL-MCNC: 3.4 G/DL (ref 3.5–5)
ALBUMIN/GLOB SERPL: 1 (ref 1.1–2.2)
ALP SERPL-CCNC: 178 U/L (ref 45–117)
ALT SERPL-CCNC: 104 U/L (ref 12–78)
ANION GAP SERPL CALC-SCNC: 8 MMOL/L (ref 5–15)
AST SERPL-CCNC: 50 U/L (ref 15–37)
BASOPHILS # BLD: 0.1 K/UL (ref 0–0.1)
BASOPHILS NFR BLD: 1 % (ref 0–1)
BILIRUB SERPL-MCNC: 0.5 MG/DL (ref 0.2–1)
BNP SERPL-MCNC: 3906 PG/ML
BUN SERPL-MCNC: 77 MG/DL (ref 6–20)
BUN/CREAT SERPL: 57 (ref 12–20)
CALCIUM SERPL-MCNC: 9.9 MG/DL (ref 8.5–10.1)
CHLORIDE SERPL-SCNC: 98 MMOL/L (ref 97–108)
CO2 SERPL-SCNC: 27 MMOL/L (ref 21–32)
CREAT SERPL-MCNC: 1.34 MG/DL (ref 0.7–1.3)
DIFFERENTIAL METHOD BLD: ABNORMAL
DIGOXIN SERPL-MCNC: 0.5 NG/ML (ref 0.9–2)
EOSINOPHIL # BLD: 0.2 K/UL (ref 0–0.4)
EOSINOPHIL NFR BLD: 2 % (ref 0–7)
ERYTHROCYTE [DISTWIDTH] IN BLOOD BY AUTOMATED COUNT: 21.6 % (ref 11.5–14.5)
GLOBULIN SER CALC-MCNC: 3.4 G/DL (ref 2–4)
GLUCOSE BLD STRIP.AUTO-MCNC: 122 MG/DL (ref 65–117)
GLUCOSE BLD STRIP.AUTO-MCNC: 122 MG/DL (ref 65–117)
GLUCOSE BLD STRIP.AUTO-MCNC: 371 MG/DL (ref 65–117)
GLUCOSE BLD STRIP.AUTO-MCNC: 400 MG/DL (ref 65–117)
GLUCOSE SERPL-MCNC: 75 MG/DL (ref 65–100)
HCT VFR BLD AUTO: 33.4 % (ref 36.6–50.3)
HGB BLD-MCNC: 9.6 G/DL (ref 12.1–17)
IMM GRANULOCYTES # BLD AUTO: 0.1 K/UL (ref 0–0.04)
IMM GRANULOCYTES NFR BLD AUTO: 1 % (ref 0–0.5)
LYMPHOCYTES # BLD: 1.1 K/UL (ref 0.8–3.5)
LYMPHOCYTES NFR BLD: 9 % (ref 12–49)
MAGNESIUM SERPL-MCNC: 2.5 MG/DL (ref 1.6–2.4)
MCH RBC QN AUTO: 22.6 PG (ref 26–34)
MCHC RBC AUTO-ENTMCNC: 28.7 G/DL (ref 30–36.5)
MCV RBC AUTO: 78.8 FL (ref 80–99)
MONOCYTES # BLD: 0.9 K/UL (ref 0–1)
MONOCYTES NFR BLD: 7 % (ref 5–13)
NEUTS SEG # BLD: 9.9 K/UL (ref 1.8–8)
NEUTS SEG NFR BLD: 80 % (ref 32–75)
NRBC # BLD: 0 K/UL (ref 0–0.01)
NRBC BLD-RTO: 0 PER 100 WBC
PHOSPHATE SERPL-MCNC: 3.4 MG/DL (ref 2.6–4.7)
PLATELET # BLD AUTO: 297 K/UL (ref 150–400)
PMV BLD AUTO: 11.1 FL (ref 8.9–12.9)
POTASSIUM SERPL-SCNC: 4.2 MMOL/L (ref 3.5–5.1)
PROCALCITONIN SERPL-MCNC: 0.54 NG/ML
PROT SERPL-MCNC: 6.8 G/DL (ref 6.4–8.2)
RBC # BLD AUTO: 4.24 M/UL (ref 4.1–5.7)
RBC MORPH BLD: ABNORMAL
SERVICE CMNT-IMP: ABNORMAL
SODIUM SERPL-SCNC: 133 MMOL/L (ref 136–145)
WBC # BLD AUTO: 12.3 K/UL (ref 4.1–11.1)

## 2023-01-05 PROCEDURE — 84145 PROCALCITONIN (PCT): CPT

## 2023-01-05 PROCEDURE — 97530 THERAPEUTIC ACTIVITIES: CPT

## 2023-01-05 PROCEDURE — 99233 SBSQ HOSP IP/OBS HIGH 50: CPT | Performed by: INTERNAL MEDICINE

## 2023-01-05 PROCEDURE — 74011250637 HC RX REV CODE- 250/637: Performed by: STUDENT IN AN ORGANIZED HEALTH CARE EDUCATION/TRAINING PROGRAM

## 2023-01-05 PROCEDURE — 74011000250 HC RX REV CODE- 250: Performed by: STUDENT IN AN ORGANIZED HEALTH CARE EDUCATION/TRAINING PROGRAM

## 2023-01-05 PROCEDURE — 74011250636 HC RX REV CODE- 250/636: Performed by: INTERNAL MEDICINE

## 2023-01-05 PROCEDURE — 80053 COMPREHEN METABOLIC PANEL: CPT

## 2023-01-05 PROCEDURE — 83735 ASSAY OF MAGNESIUM: CPT

## 2023-01-05 PROCEDURE — 84100 ASSAY OF PHOSPHORUS: CPT

## 2023-01-05 PROCEDURE — 74011250637 HC RX REV CODE- 250/637: Performed by: INTERNAL MEDICINE

## 2023-01-05 PROCEDURE — 80162 ASSAY OF DIGOXIN TOTAL: CPT

## 2023-01-05 PROCEDURE — 83880 ASSAY OF NATRIURETIC PEPTIDE: CPT

## 2023-01-05 PROCEDURE — 74011250636 HC RX REV CODE- 250/636: Performed by: STUDENT IN AN ORGANIZED HEALTH CARE EDUCATION/TRAINING PROGRAM

## 2023-01-05 PROCEDURE — 74011250636 HC RX REV CODE- 250/636: Performed by: CLINICAL NURSE SPECIALIST

## 2023-01-05 PROCEDURE — 82962 GLUCOSE BLOOD TEST: CPT

## 2023-01-05 PROCEDURE — 74011250637 HC RX REV CODE- 250/637: Performed by: NURSE PRACTITIONER

## 2023-01-05 PROCEDURE — 85025 COMPLETE CBC W/AUTO DIFF WBC: CPT

## 2023-01-05 PROCEDURE — 74011250637 HC RX REV CODE- 250/637: Performed by: FAMILY MEDICINE

## 2023-01-05 PROCEDURE — 65660000001 HC RM ICU INTERMED STEPDOWN

## 2023-01-05 PROCEDURE — 74011636637 HC RX REV CODE- 636/637: Performed by: INTERNAL MEDICINE

## 2023-01-05 PROCEDURE — 74011636637 HC RX REV CODE- 636/637: Performed by: CLINICAL NURSE SPECIALIST

## 2023-01-05 PROCEDURE — 97116 GAIT TRAINING THERAPY: CPT

## 2023-01-05 PROCEDURE — 99232 SBSQ HOSP IP/OBS MODERATE 35: CPT | Performed by: INTERNAL MEDICINE

## 2023-01-05 RX ORDER — HYDROCORTISONE SODIUM SUCCINATE 100 MG/2ML
10 INJECTION, POWDER, FOR SOLUTION INTRAMUSCULAR; INTRAVENOUS EVERY 12 HOURS
Status: DISCONTINUED | OUTPATIENT
Start: 2023-01-05 | End: 2023-01-06

## 2023-01-05 RX ORDER — IPRATROPIUM BROMIDE AND ALBUTEROL SULFATE 2.5; .5 MG/3ML; MG/3ML
3 SOLUTION RESPIRATORY (INHALATION)
Status: DISCONTINUED | OUTPATIENT
Start: 2023-01-05 | End: 2023-01-19 | Stop reason: HOSPADM

## 2023-01-05 RX ORDER — INSULIN LISPRO 100 [IU]/ML
INJECTION, SOLUTION INTRAVENOUS; SUBCUTANEOUS
Status: DISCONTINUED | OUTPATIENT
Start: 2023-01-05 | End: 2023-01-09

## 2023-01-05 RX ORDER — HYDROCORTISONE SODIUM SUCCINATE 100 MG/2ML
10 INJECTION, POWDER, FOR SOLUTION INTRAMUSCULAR; INTRAVENOUS EVERY 12 HOURS
Status: DISCONTINUED | OUTPATIENT
Start: 2023-01-05 | End: 2023-01-05

## 2023-01-05 RX ORDER — DOBUTAMINE HYDROCHLORIDE 200 MG/100ML
2 INJECTION INTRAVENOUS CONTINUOUS
Status: DISCONTINUED | OUTPATIENT
Start: 2023-01-05 | End: 2023-01-06

## 2023-01-05 RX ADMIN — SODIUM CHLORIDE, PRESERVATIVE FREE 10 ML: 5 INJECTION INTRAVENOUS at 05:06

## 2023-01-05 RX ADMIN — Medication: at 08:53

## 2023-01-05 RX ADMIN — CLOPIDOGREL BISULFATE 75 MG: 75 TABLET ORAL at 08:47

## 2023-01-05 RX ADMIN — HEPARIN SODIUM 5000 UNITS: 5000 INJECTION INTRAVENOUS; SUBCUTANEOUS at 08:55

## 2023-01-05 RX ADMIN — ACETAMINOPHEN 650 MG: 325 TABLET ORAL at 22:34

## 2023-01-05 RX ADMIN — HEPARIN SODIUM 5000 UNITS: 5000 INJECTION INTRAVENOUS; SUBCUTANEOUS at 22:34

## 2023-01-05 RX ADMIN — Medication 6 UNITS: at 22:33

## 2023-01-05 RX ADMIN — Medication 14 UNITS: at 08:51

## 2023-01-05 RX ADMIN — Medication 5 UNITS: at 11:55

## 2023-01-05 RX ADMIN — DOBUTAMINE HYDROCHLORIDE 2 MCG/KG/MIN: 200 INJECTION INTRAVENOUS at 11:57

## 2023-01-05 RX ADMIN — HYDROCORTISONE SODIUM SUCCINATE 25 MG: 100 INJECTION, POWDER, FOR SOLUTION INTRAMUSCULAR; INTRAVENOUS at 08:48

## 2023-01-05 RX ADMIN — Medication 5 UNITS: at 17:25

## 2023-01-05 RX ADMIN — QUETIAPINE FUMARATE 50 MG: 25 TABLET ORAL at 22:34

## 2023-01-05 RX ADMIN — IPRATROPIUM BROMIDE 2 SPRAY: 21 SPRAY, METERED NASAL at 08:52

## 2023-01-05 RX ADMIN — Medication: at 17:27

## 2023-01-05 RX ADMIN — MIRTAZAPINE 7.5 MG: 15 TABLET, FILM COATED ORAL at 22:34

## 2023-01-05 RX ADMIN — IPRATROPIUM BROMIDE 2 SPRAY: 21 SPRAY, METERED NASAL at 22:46

## 2023-01-05 RX ADMIN — HYDROCORTISONE SODIUM SUCCINATE 10 MG: 100 INJECTION, POWDER, FOR SOLUTION INTRAMUSCULAR; INTRAVENOUS at 22:33

## 2023-01-05 RX ADMIN — Medication 7 UNITS: at 17:25

## 2023-01-05 RX ADMIN — SODIUM CHLORIDE, PRESERVATIVE FREE 10 ML: 5 INJECTION INTRAVENOUS at 14:14

## 2023-01-05 RX ADMIN — ASPIRIN 81 MG: 81 TABLET, COATED ORAL at 08:47

## 2023-01-05 RX ADMIN — Medication 7 UNITS: at 11:54

## 2023-01-05 RX ADMIN — Medication 5 UNITS: at 08:51

## 2023-01-05 RX ADMIN — TAMSULOSIN HYDROCHLORIDE 0.4 MG: 0.4 CAPSULE ORAL at 08:47

## 2023-01-05 RX ADMIN — PANTOPRAZOLE SODIUM 40 MG: 40 TABLET, DELAYED RELEASE ORAL at 07:07

## 2023-01-05 RX ADMIN — SODIUM CHLORIDE, PRESERVATIVE FREE 10 ML: 5 INJECTION INTRAVENOUS at 22:34

## 2023-01-05 RX ADMIN — DIGOXIN 0.12 MG: 125 TABLET ORAL at 08:47

## 2023-01-05 NOTE — PROGRESS NOTES
Comprehensive Nutrition Assessment    Type and Reason for Visit: Reassess    Nutrition Recommendations/Plan:   Will liberalize diet order to just 4 Carb Choice to help promote increased PO intakes  Will decrease Ensure Enlive to 1x/day and add Glucerna BID  Will d/c Ensure Compact  Continue Magic Cup  Pt with Remeron ordered which may help improve appetite but it is not doing so as PO intakes remain very poor. Recommend changing order to alternate appetite stimulant if Remeron is currently ordered only as such, or ordering an appetite stimulant if it is being used to improve mood. Malnutrition Assessment:  Malnutrition Status:  Severe malnutrition (12/29/22 1147)    Context:  Acute illness     Findings of the 6 clinical characteristics of malnutrition:   Energy Intake:  Unable to assess  Weight Loss:  Greater than 5% over 1 month     Body Fat Loss: Moderate body fat loss, Buccal region, Orbital   Muscle Mass Loss: Moderate muscle mass loss, Clavicles (pectoralis & deltoids), Thigh (quadriceps), Calf  Fluid Accumulation:  No significant fluid accumulation,     Strength:  Not performed        Nutrition Assessment:    Pt admitted with Sepsis. PMHx: CAD, DM 2, HTN, NSTEMI. Cardiogenic shock resolved-off pressor. Cardiomyopathy systolic heart failure; ?myocarditis. Steroids ordered; biopsy deferred for now. EF ~20%. TANNER on CKD 3. Severe fatigue. 1/5:  Follow up. Spoke with pt and wife at bedside. Pt states he did not eat much of dinner last night that wife brought it because the nurse did not heat it up correctly. For breakfast this morning he ate 1 piece of toast and turkey sausage, drank OJ and Ensure. Received a perfectserve message from Diabetes Nurse Specialist about possibly changing his ONS to provide few CHO. Spoke with pt about trying Glucerna and he was agreeable (vanilla flavor). Will continue strawberry Ensure Enlive 1x/day as pt enjoys this.   Wife states she had trouble ordering meals for him because he wanted a grilled cheese sandwich but was told he cannot have it with his current Na+ restriction. Discussed my plan to liberalize his diet with wife and suggested she call again today to order that for him for dinner. He already placed lunch order: chicken noodle soup, fish, mashed potatoes, green beans. Pt has been eating very little of our food so I'm hoping if he can order things he enjoys it might help with his intakes. Weight has been stable this admission. Pt with severe muscle and fat wasting. I continue to encourage increasing his PO intakes. Labs: LFT's elevated, POC BG last 24h: 122-383      1/4:  Spoke with pt and wife at bedside. RD previously discussed recommendation for DHT to provide nocturnal TF with pt's daughter. This information was passed on to pt's wife so when I brought this up she was aware of this conversation. I explained how this would work, the benefit of it, etc.  She asked appropriate questions and ultimately decided that she wants to hold off on having DHT placed at this time and try to encourage increased PO intakes. Pt had just consumed about 25% of a small hamburger that wife had made and then vomited. No previous episodes of emesis this admission. Pt states he is drinking the Ensure [place] when it comes and also drinking milk. Now complaining that this might be too much milk at one time and his stomach is not feeling the best with so much. Encouraged pt to separate when he drinks the Ensures and milks. Provided pt with a menu and the diet office # for him and his wife to call and order meals for him. He was asking me for soup. Wife also continues to bring in meals from home and some spices. PO intakes have remained poor. 1/2: MD consult received for CHF diet education. Briefly discussed this with pt. No family present at time of visit but pt states his son will be coming in later today and will look over the information.   Handout with RD contact info left in pt's room. Did discuss his intakes as they have been poor. He states he is eating very little still. Is drinking Ensure and milk and eating some of what his family brings in. Has also tried [employer] and likes that okay, states he is eating some of it. Did not offer up any other food preferences. Encouraged increased PO intakes. 12/29: Per wife, pt weighed 135# when admitted on 12/5. Current wt reflects 22# weight loss-suspect mostly fluid however wife reports pt without edema when weighed 135#. Pt appears malnourished; meets criteria for acute malnutrition (see above). Poor PO intake noted by wife since first hospital admission but worse in the past week. Family bringing in food from home; Mr Ebonie Rose did accept strawberry Ensure well yesterday. Pt complains of early satiety-will take a few bites and then states he doesn't want anymore. Encouraged wife to have patient eat throughout the day vs just at mealtime; consume Ensure supplements between meals. Mr Ebonie Rose is accepting bananas and milk well per wife; wife also bringing in extra spices for hospital food as well as [employer] ([facility]) food that pt normally consumes. Will modify Glucerna shake to Ensure Plus HP since pt eating little and prefers strawberry flavor. Ensure also provides more kcal/protein per serving (350 kcal/20 gm protein vs 220 kcal/10 gm protein). Per documentation below-pt is at max consuming ~35% of meals. Will continue to monitor intake and weight. May benefit from short term EN support. Potassium replaced today. Insulin drip ordered 2/2 addition of steroid. BG close to 500 yesterday evening-better today. BUN, CR, Phosphorus and Magnesium all elevated d/t TANNER. Vit D discontinued today 2/2 hypercalcemia-nephrology following.          Nutritionally Significant Medications:  Humalog, Dobutamine, Remeron, Protonix      Estimated Daily Nutrient Needs:  Energy Requirements Based On: Kcal/kg  Weight Used for Energy Requirements: Current (51.7 kg)  Energy (kcal/day): 8445-9882 (30-35 kcal/kg)  Weight Used for Protein Requirements: Current  Protein (g/day): 77 (1.5 g/kg)  Method Used for Fluid Requirements: 1 ml/kcal  Fluid (ml/day):      Nutrition Related Findings:   Edema: none  Last BM: 01/05/23, Formed    Wounds: Unstageable      Current Nutrition Therapies:  Diet: 4 Carb, 2 gm Na+, Low Fiber  Supplements: Ensure Enlive BID, Ensure compact 1x/d, Magic Cup 1x/d  Meal intake: Patient Vitals for the past 168 hrs:   % Diet Eaten   01/05/23 0842 51 - 75%   01/04/23 1513 51 - 75%   01/04/23 1228 51 - 75%   01/04/23 0900 1 - 25%   01/04/23 0302 0%   01/02/23 1755 1 - 25%   01/02/23 1245 1 - 25%   01/02/23 0900 1 - 25%   01/01/23 1800 1 - 25%   01/01/23 1300 1 - 25%   01/01/23 0800 1 - 25%   12/31/22 1800 1 - 25%   12/31/22 1300 1 - 25%   12/31/22 0900 1 - 25%     Supplement intake: Patient Vitals for the past 168 hrs:   Supplement intake %   01/04/23 0302 0%     Nutrition Support: none      Anthropometric Measures:  Height: 5' 9\" (175.3 cm)  Ideal Body Weight (IBW): 160 lbs (73 kg)     Current Body Wt:  51.5 kg (113 lb 8.6 oz), 71 % IBW.  Bed scale  Current BMI (kg/m2): 16.8                          BMI Category: Underweight (BMI less than 22) age over 72    Wt Readings from Last 10 Encounters:   01/05/23 51.5 kg (113 lb 8.6 oz)   12/19/22 58.5 kg (129 lb)   12/16/22 57.4 kg (126 lb 8.7 oz)   12/05/22 59 kg (130 lb)   12/05/22 59.1 kg (130 lb 3.2 oz)   11/16/22 61.2 kg (135 lb)   10/27/22 60.8 kg (134 lb)   10/27/22 60.8 kg (134 lb)   10/12/22 60.8 kg (134 lb)   09/13/22 60.3 kg (133 lb)           Nutrition Diagnosis:   Inadequate oral intake related to inadequate protein-energy intake as evidenced by intake 26-50%  Underweight related to inadequate protein-energy intake as evidenced by BMI (16.8)    Nutrition Interventions:   Food and/or Nutrient Delivery: Modify current diet, Modify oral nutrition supplement  Nutrition Education/Counseling: No recommendations at this time  Coordination of Nutrition Care: Continue to monitor while inpatient, Interdisciplinary rounds       Goals:  Previous Goal Met: No progress toward goal(s)  Goals: PO intake 75% or greater, within 7 days       Nutrition Monitoring and Evaluation:   Behavioral-Environmental Outcomes: None identified  Food/Nutrient Intake Outcomes: Supplement intake, Food and nutrient intake  Physical Signs/Symptoms Outcomes: Biochemical data, Fluid status or edema, Weight, Hemodynamic status, GI status    Discharge Planning:    Continue current diet, Continue oral nutrition supplement    Bashir Donaldson RD  Available via [employer]

## 2023-01-05 NOTE — PROGRESS NOTES
0730: Bedside shift change report given to Joan Joseph RN (oncoming nurse) by Amaris Gil RN (offgoing nurse). Report included the following information SBAR, MAR, and Recent Results. 1930: Bedside shift change report given to Amaris Gil RN (oncoming nurse) by Joan Joseph RN (offgoing nurse). Report included the following information SBAR, MAR, and Recent Results.

## 2023-01-05 NOTE — PROGRESS NOTES
Physical Therapy 1/5/23    Treatment performed. Vitals as below. Full note to follow. Nader Clark underwent a 6 min walk test. He was unable to complete the full 6 minutes, he ambulated for 4 minutes and 30 seconds. His initial O2  saturation was 100  % and his baseline heart rate was 97  BPM. He walked from room to ravi at a distance of  150. His post O2  saturation was  100 % and his post walk heart rate was 98  BPM.      01/05/23 0952 01/05/23 0954 01/05/23 0957   Vital Signs   Pulse (Heart Rate) 93 95 98   BP (!) 115/40 (!) 100/31 (!) 98/35   MAP (Calculated) 65 (!) 54 (!) 56   BP 1 Location Left upper arm Left upper arm Left upper arm   BP 1 Method Automatic Automatic Automatic   BP Patient Position Semi fowlers Sitting Standing   O2 Sat (%)  --  100 % 100 %      01/05/23 1003 01/05/23 1015   Vital Signs   Pulse (Heart Rate) 96 98   BP (!) 124/45 (!) 136/50   MAP (Calculated) 71 79   BP 1 Location Left upper arm Left upper arm   BP 1 Method Automatic Automatic   BP Patient Position Sitting; Other (Comment)  (post gait training) Sitting; Other (Comment)  (post walking/activity)   O2 Sat (%) 100 % 100 %     Thank you for your consideration,    Vineet Eng, PT, DPT

## 2023-01-05 NOTE — CONSULTS
[facility] [place]: Darian Mckee  [phone]      HPI: Cristobal Preciado, a 78y.o. year-old who presents for evaluation of CAD. Has shortness of breaht/ fatigue and near syncope with exertion. Last event yesterday am.   Stopped hctz, arb. Bp higher today but no more orthostatic episodes, no more syncope. Would move to floor today and watch bp with ambulation. Still high risk to go home for colon prep tomorrow given recent instability probably best to stay and have his test completed Tuesday. Assessment/Plan:  1. CAd severe, await CABg, holding plavix for surgery  2. GI bleed- hgb stable, colonoscopy Tuesday. 3. NSTEMI await cabg  4. DM glucose running high,   5. Anemia- mild, stable    He  has a past medical history of CAD (coronary artery disease) (11/10/2016); Deafness (10/28/2012); [facility] ([facility] Elevated cholesterol; Hypertension; and NSTEMI (non-ST elevated myocardial infarction) ([facility] (11/10/2016). Cardiovascular ROS: positive for - shortness of breath and near syncope  Respiratory ROS: no cough, shortness of breath, or wheezing  Neurological ROS: no TIA or stroke symptoms  All other systems negative except as above. PE  Vitals:    06/24/18 0600 06/24/18 0700 06/24/18 0813 06/24/18 0900   BP: 155/47 119/82 94/47 164/57   Pulse: 73 77 78 (!) 108   Resp: (!) 0 (!) 0 16 21   Temp:   98.2 °F (36.8 °C)    SpO2: 100% 95% 96% 99%   Weight:       Height:        Body mass index is 23.51 kg/(m^2).    General appearance - alert, well appearing, and in no distress  Mental status - affect appropriate to mood  Eyes - sclera anicteric, moist mucous membranes  Neck - supple, no significant adenopathy  Lymphatics - no  lymphadenopathy  Chest - clear to auscultation, no wheezes, rales or rhonchi  Heart - normal rate, regular rhythm, normal S1, S2, no murmurs, rubs, clicks or gallops  Abdomen - soft, nontender, nondistended, no masses or organomegaly  Back exam - full range of motion, no removed. tenderness  Neurological - cranial nerves II through XII grossly intact, no focal deficit  Musculoskeletal - no muscular tenderness noted, normal strength  Extremities - peripheral pulses normal, no pedal edema  Skin - normal coloration  no rashes    Recent Labs:  Lab Results   Component Value Date/Time    Cholesterol, total 171 06/22/2018 10:11 AM    HDL Cholesterol 40 06/22/2018 10:11 AM    LDL, calculated 101 (H) 06/22/2018 10:11 AM    Triglyceride 150 (H) 06/22/2018 10:11 AM    CHOL/HDL Ratio 4.3 06/22/2018 10:11 AM     Lab Results   Component Value Date/Time    Creatinine 1.30 06/24/2018 04:39 AM     Lab Results   Component Value Date/Time    BUN 26 (H) 06/24/2018 04:39 AM     Lab Results   Component Value Date/Time    Potassium 4.9 06/24/2018 04:39 AM     Lab Results   Component Value Date/Time    Hemoglobin A1c 7.4 (H) 06/22/2018 01:53 PM     Lab Results   Component Value Date/Time    HGB 10.0 (L) 06/24/2018 04:39 AM     Lab Results   Component Value Date/Time    PLATELET 629 27/28/6990 04:39 AM       Reviewed:  Past Medical History:   Diagnosis Date    CAD (coronary artery disease) 11/10/2016    NSTEMI & 2 stents    Deafness 10/28/2012    [facility]di[facility]     Elevated cholesterol     Hypertension     [facility] 11/10/2016     History   Smoking Status    Never Smoker   Smokeless Tobacco    Never Used     History   Alcohol Use    1.2 oz/week    1 Cans of beer, 1 Shots of liquor per week     No Known Allergies    Current Facility-Administered Medications   Medication Dose Route Frequency    sodium chloride (NS) flush 5-10 mL  5-10 mL IntraVENous Q8H    sodium chloride (NS) flush 5-10 mL  5-10 mL IntraVENous PRN    acetaminophen (TYLENOL) suppository 650 mg  650 mg Rectal Q4H PRN    hydroCHLOROthiazide (HYDRODIURIL) tablet 25 mg  25 mg Oral DAILY AFTER BREAKFAST    aspirin chewable tablet 81 mg  81 mg Oral DAILY    insulin lispro (HUMALOG) injection   SubCUTAneous AC&HS    glucose chewable tablet 16 g  4 Tab Oral PRN    dextrose (D50W) injection syrg 12.5-25 g  12.5-25 g IntraVENous PRN    glucagon (GLUCAGEN) injection 1 mg  1 mg IntraMUSCular PRN    sodium chloride (NS) flush 5-10 mL  5-10 mL IntraVENous Q8H    sodium chloride (NS) flush 5-10 mL  5-10 mL IntraVENous PRN    amiodarone (CORDARONE) tablet 400 mg  400 mg Oral Q12H    mupirocin (BACTROBAN) 2 % ointment 1 g  1 g Both Nostrils BID    LORazepam (ATIVAN) tablet 1 mg  1 mg Oral Q6H PRN    losartan (COZAAR) tablet 50 mg  50 mg Oral DAILY    metoprolol tartrate (LOPRESSOR) tablet 50 mg  50 mg Oral Q12H    atorvastatin (LIPITOR) tablet 20 mg  20 mg Oral QHS       Kyung Brantley MD  [facility] and [facility] [place]  [address] [facility] [place] [address]

## 2023-01-05 NOTE — PROGRESS NOTES
[facility] CARDIOLOGY   Care Note                  []Initial visit     [x]Established visit     Patient Name: Carissa Easton - WKK:[id number] - PXS:[id number]  Primary Cardiologist: Ramu Giron MD  Consulting Cardiologist: Slim Chow md     Reason for initial visit:  dyspnea, decompensated HF, tachycardia. SUBJECTIVE:    Remains on dobutamine drip at 4 mics per KG per minute. Overall appears euvolemic. Blood pressures were previously initial.  May reconsider down titrating dobutamine. Assessment and Plan       1. Dyspnea/decompensated HFrEF- EF 20%-hepatic and renal function down to baseline indicating adequate tissue perfusion. Cardiogenic shock improving. May consider slowly down titrating dobutamine but I doubt he will be able to come off dobutamine completely in a short period of time. May require home dobutamine therapy for a few weeks. Long-term prognosis still guarded and may end up requiring permanent/long-term LAD support if family interested. Heart failure team to manage. Discussed importance of continued activity to avoid muscle mass loss. Continue physical therapy    2  CAD - sp CABG in 2018, cath 9/8/2021 open LIMA, PCI to OM 2 all the way up to the left main 2/28/2022. Cath 12/6/2022 open LIMA and RCA graft some in-stent restenosis in the long graft from the left main to OM 2 but not severe and the OM1 was more severely diseased in the small vessel. Interventional cardiology felt no reasonable vessel to intervene and recommended medical management. The OM disease is consistent with a lateral wall ischemia demonstrated on prior nuclear stress test.  Continue aspirin, plavix. Holding statin for elevated LFTs. Continue to monitor CBC for anemia. Hgb up to 9.7 currently. Microcytic indices.       3.  Aortic stenosis mild mean gradient of 7 nn Hg, HUY 1.4 by echo 11/27/2022 -mild , murmur     4. Diabetes mellitus type 2- On insulin, management per IM     5. PAD: S/P Right SFA PTCA 12/6/22. Continue ASA, plavix. Statins can be reinitiated as needed. 6. 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 DNR status in place        HPI:     CAD with CABG 6/9/2018. NSTEMI in November 2016 and resolved pulmonary HTN. Stent to circumflex an LAD in 2016. Stress echo in 2018 with a drop in BP with exercise and a drop in EF. Cath on 6/22/18 that showed even with a 5F catheter, he dampened with suspected ostial 3 vessel disease. Echo 3/27/2021 = EF 55 to 60% mild AR MR TR LAE MAC  Nuclear 3/18/2021 EF 64% medium size defect apical and inferior lateral reversible ischemia medium defect mid and inferolateral nonreversible appear to be infarction intermediate risk stress test .  PCI 02/28/2022--patent LIMA to LAD, vein graft to distal RCA with severe disease in the native vessels Occluded vein graft to OM 2. Native OM 2 severely diseased along with proximal to mid circumflex as well as distal left main. Overall the vessel is significantly remodeled negatively. Additionally there is significant disease in the first marginal branch which is even smaller as well as AV groove branch right at the takeoff of OM2. Single stent 2.25 x 28 mm Xience was placed extending from the OM 2 into the circumflex into the distal left main and sequentially dilated with 2.25 noncompliant, 2 5 noncompliant, 3 oh noncompliant and 3 5 noncompliant to perform multilevel POT to manage multiple bifurcations on the way. Atherectomy was considered but given small size of the vessels, was not deemed to be absolutely safe for the obtuse marginal lesion. Overall stent expanded fairly well related to the size of the vessels.  Normal LVEDP  Nuclear stress October 27, 2022 test showed ischemia similar to 3/15/2021 with a medium size defect in the mid to distal inferolateral and anterolateral segments in the circumflex territory he had septal dyskinesia with an EF of 39%. He had a fixed apical infarct  Echocardiogram October 27, 2022 showed an EF of 45 to 50% TAPSE at 1.2 showing reduced RV function sclerosis of the aortic valve with stenosis that was very mild with a valve area of mean of 7 mmHg and HUY of 1.4 cm². The mitral was thickened with restricted mobility and mild MR.  ____________________________________________________________    Cardiac testing  12/22/22    ECHO ADULT COMPLETE 12/26/2022 12/26/2022    Interpretation Summary    Left Ventricle: Severely reduced left ventricular systolic function with a visually estimated EF of 25 - 30%. Left ventricle size is normal. Normal wall thickness. There are regional wall motion abnormalities. Grade II diastolic dysfunction with increased LAP. Right Ventricle: Moderately reduced systolic function. TAPSE is abnormal. TAPSE is 1.1 cm. Aortic Valve: Mild stenosis of the aortic valve. AV peak gradient is 13 mmHg. AV peak velocity is 1.8 m/s. Mitral Valve: Not well visualized. Moderate annular calcification at the posterior leaflet of the mitral valve. Mild to moderate regurgitation. Tricuspid Valve: Mildly elevated RVSP. Left Atrium: Left atrium is moderately dilated. Signed by: Radha Torres MD on 12/26/2022  3:13 PM          10/27/22    NUCLEAR CARDIAC STRESS TEST 10/27/2022 11/1/2022    Interpretation Summary    Nuclear Findings: The study is positive for myocardial ischemia. The defect appears to be ischemia. The areas of ischemia are similar to 3/15/2021 study. Nuclear Findings: There is a moderate severity left ventricular stress perfusion defect that is medium in size present in the mid to distal inferolateral and anterolateral segment(s) that is reversible. The defect is consistent with abnormal perfusion in the LCx territory. There is normal wall motion in the defect area. The defect appears to be probable ischemia.  There is a moderate severity second left ventricular stress perfusion defect. The defect appears to be infarction. Nuclear Findings: Abnormal left ventricular systolic function post-stress. Septal dyskinesis. Post-stress ejection fraction is 39%. ECG: Resting ECG demonstrates normal sinus rhythm. ECG: Stress ECG was negative for ischemia. Stress Test: A pharmacological stress test was performed using lexiscan. Blood pressure demonstrated a normal response and heart rate demonstrated a normal response to stress. The patient's heart rate recovery was normal. The patient reported no symptoms during the stress test.    Signed by: Merlene Hankins MD on 10/27/2022  4:45 PM    02/28/22    CARDIAC PROCEDURE 02/28/2022 2/28/2022    Conclusion  Findings  1. Severe native multivessel coronary disease  2. Patent LIMA to LAD, vein graft to distal RCA with severe disease in the native vessels  3. Occluded vein graft to OM 2. Native OM 2 severely diseased along with proximal to mid circumflex as well as distal left main. Overall the vessel is significantly remodeled negatively. Additionally there is significant disease in the first marginal branch which is even smaller as well as AV groove branch right at the takeoff of OM2. Single stent 2.25 x 28 mm Xience was placed extending from the OM 2 into the circumflex into the distal left main and sequentially dilated with 2.25 noncompliant, 2 5 noncompliant, 3 oh noncompliant and 3 5 noncompliant to perform multilevel POT to manage multiple bifurcations on the way. Atherectomy was considered but given small size of the vessels, was not deemed to be absolutely safe for the obtuse marginal lesion. Overall stent expanded fairly well related to the size of the vessels. 4.  Normal LVEDP    Access right radial: Small vessel, tortuous} brachiocephalic  Contrast 65 cc    Recommendations  1. Plavix based dual antiplatelet therapy  2.   Guideline directed medical therapy for secondary prevention of coronary events    Signed by: Soni Salgado MD on 2/28/2022  4:22 PM    Most recent HS troponins:  Recent Labs     01/04/23  0231   TROPHS 12*         Review of Systems    [x]All other systems reviewed and all negative except as written in HPI    [] Patient unable to provide secondary to condition         Past Medical History:   Diagnosis Date    CAD (coronary artery disease) 11/10/2016    NSTEMI & 2 stents    Deafness 10/28/2012    [facility]     Elevated cholesterol     Hypertension     NSTEMI (non-ST elevated myocardial infarction) ([facility] 11/10/2016     Past Surgical History:   Procedure Laterality Date    COLONOSCOPY N/A 6/28/2018    COLONOSCOPY performed by Alida Chase MD at [address]  11/11/2016    2 stents     Social Hx:  reports that he has never smoked. He has never been exposed to tobacco smoke. He has never used smokeless tobacco. He reports current alcohol use of about 2.0 standard drinks per week. He reports that he does not use drugs. Family Hx: family history includes Elevated Lipids in his brother, brother, and brother; Heart Attack in his father; Heart Disease in his father; Hypertension in his mother; No Known Problems in his daughter, sister, and son.   No Known Allergies       OBJECTIVE:  Wt Readings from Last 3 Encounters:   01/05/23 113 lb 8.6 oz (51.5 kg)   12/19/22 129 lb (58.5 kg)   12/16/22 126 lb 8.7 oz (57.4 kg)       Intake/Output Summary (Last 24 hours) at 1/5/2023 1645  Last data filed at 1/5/2023 1556  Gross per 24 hour   Intake 927.64 ml   Output 1650 ml   Net -722.36 ml             Physical Exam    Vitals:   Vitals:    01/05/23 1200 01/05/23 1204 01/05/23 1400 01/05/23 1556   BP:    (!) 137/57   Pulse: 86  100 98   Resp:    21   Temp:    98.1 °F (36.7 °C)   SpO2:    98%   Weight:       Height:  5' 9\" (1.753 m)       Telemetry: normal sinus rhythm  BP (!) 137/57 (BP 1 Location: Left upper arm, BP Patient Position: At rest)   Pulse 98   Temp 98.1 °F (36.7 °C)   Resp 21   Ht 5' 9\" (1.753 m)   Wt 113 lb 8.6 oz (51.5 kg)   SpO2 98%   BMI 16.77 kg/m²   General:    Alert, cooperative, no distress. Psychiatric:    Normal Mood and affect    Eye/ENT:      Pupils equal, No asymmetry, Conjunctival pink. Able to hear voice at normal amplitude   Lungs:      Visibly symmetric chest expansion, No palpable tenderness. Clear to auscultation bilaterally. Heart[de-identified]    Regular rate and rhythm, S1, S2 normal, no murmur, click, rub or gallop. No JVD, Normal palpable peripheral pulses. No cyanosis   Abdomen:     Soft, non-tender. Bowel sounds normal. No masses,  No      organomegaly. Extremities:   Extremities normal, atraumatic, no edema. Neurologic:   CN II-XII grossly intact. No gross focal deficits           Data Review:     Radiology:   XR Results (most recent):  Results from Hospital Encounter encounter on 12/22/22    XR CHEST PORT    Narrative  INDICATION:  Rule out PNA    EXAM: Chest single view. COMPARISON: 12/31/2022. FINDINGS: A single frontal view of the chest at 0953 hours shows stable  bibasilar atelectasis and interstitial prominence. No new focal infiltrate. Small right pleural effusion stable. .  The heart, mediastinum and pulmonary  vasculature are stable status post median sternotomy . The bony thorax is  unremarkable for age. .    Impression  Stable bibasilar atelectasis and interstitial prominence with small right  pleural effusion. .  . CT Results (most recent):  Results from Hospital Encounter encounter on 12/22/22    CT ABD PELV WO CONT    Narrative  EXAM: CT ABD PELV WO CONT    INDICATION: rlq abdominal pain    COMPARISON: 5/3/2014    IV CONTRAST: None. ORAL CONTRAST: None    TECHNIQUE:  Thin axial images were obtained through the abdomen and pelvis. Coronal and  sagittal reformats were generated.  CT dose reduction was achieved through use of  a standardized protocol tailored for this examination and automatic exposure  control for dose modulation. The absence of intravenous contrast material reduces the sensitivity for  evaluation of the vasculature and solid organs. FINDINGS:  LOWER THORAX: Small bilateral pleural effusions. Partial collapse bases  LIVER: No mass. BILIARY TREE: Gallstones. No evidence of acute cholecystitis CBD is not dilated. SPLEEN: within normal limits. PANCREAS: No focal abnormality. ADRENALS: 19 mm right adrenal nodule unchanged,  KIDNEYS/URETERS: Mild bilateral hydronephrosis. No stone  STOMACH: Unremarkable. SMALL BOWEL: No dilatation or wall thickening. COLON: No dilatation or wall thickening. APPENDIX: Surgically absent  PERITONEUM: No ascites or pneumoperitoneum. RETROPERITONEUM: No lymphadenopathy or aortic aneurysm. Extensive vascular  calcifications  REPRODUCTIVE ORGANS: Mildly enlarged  URINARY BLADDER: Massive distention  BONES: No destructive bone lesion. ABDOMINAL WALL: Small umbilical hernia containing fat. ADDITIONAL COMMENTS: N/A    Impression  1. Bladder is massively distended with mild bilateral hydronephrosis. May  indicate bladder outlet obstruction. Prostate is mildly enlarged  2. Gallstones. No acute cholecystitis  3. Small bilateral pleural effusions    MRI Results (most recent):  Results from Hospital Encounter encounter on 12/22/22    MRI CARDIAC MORPH FUNC W WO CONT    Narrative  CARDIOVASCULAR MRI    INDICATION: suspected myocarditis. Dr. Lizette Rodriguez aware of need for MRI and planned  for Tuesday at [facility]:    CPT Codes: 92182    SCANS PERFORMED:  Spin Echo: Axial.  FIESTA/SSFP  LGE    Contrast Agent: ProHance. Contrast Dose: 15ml. CLINICAL DATA:  HR = 103/min, BP = 81/61mmHg,  HT = 5 foot 9inc, WT = 108lb. Impression  1. Normal left ventricular cavity size. Severe left ventricular systolic  dysfunction.  Severe hypokinesis of the base to mid and distal anterior wall,  anteroseptal wall, anteroapical wall, apical septal wall and apex. Mild  hypokinesis of the lateral wall. 3-D LVEF 23%. 2. Normal right ventricular size and systolic function. 3. Sclerotic aortic valve leaflets. Mild to moderate aortic valve stenosis. Aortic valve area is 1.3 sq cm by planimetry. 4. On T2 W imaging there is no significant myocardial edema seen. On EGE and LGE  study, there is patchy subendocardial infarct involving LAD territory including  mid to distal anterior wall, anteroseptal wall, anteroapical wall, apex, apical  septal wall involving less than 25% thickness of the myocardial wall with  thinning of the apex and apical septal wall. There is medium grade myocardial  viability of these walls. There is a small to medium size subendocardial infarct  of the basal inferior wall. There is mild patchy subepicardial enhancement of  the lateral wall suggestive of nonischemic etiology. There is no features of  infiltrative sarcoidosis or cardiac amyloidosis. 5. Normal pleura and pericardium. There is no significant effusions. Pericardium:  Normal. (<3.5mm)  Pericardial Effusion:  None. Pleural Effusion:  None. VOLUMETRIC DATA:    LVEDVI:  71 ml/m2 (Normal 47-92 mL/sq-m)  LVESVI:  55 ml/m2 (Normal 13-30 mL/sq-m)  LVSVI:  17 ml/m2 (Normal 32-62 mL/sq-m)  LVMI:  59 g/m2      Recent Labs     01/04/23 0231   CPK 87     Recent Labs     01/05/23 0023 01/05/23 0022 01/04/23 0231   NA  --  133* 135*   K  --  4.2 4.4   CL  --  98 102   CO2  --  27 26   BUN  --  77* 96*   CREA  --  1.34* 1.57*   GLU  --  75 127*   PHOS 3.4  --  3.8   CA  --  9.9 10.4*  10.7*       Recent Labs     01/05/23 0022 01/04/23 0231   WBC 12.3* 13.4*   HGB 9.6* 10.4*   HCT 33.4* 35.6*    319       Recent Labs     01/05/23 0022 01/04/23 0231   * 184*         No results for input(s): CHOL, LDLC in the last 72 hours.     No lab exists for component: TGL, HDLC,  HBA1C  No results for input(s): CRP, TSH, TSHEXT, TSHEXT in the last 72 hours.     No lab exists for component: ESR      Current meds:    Current Facility-Administered Medications:     albuterol-ipratropium (DUO-NEB) 2.5 MG-0.5 MG/3 ML, 3 mL, Nebulization, Q6H PRN, Nathen Thomas MD    DOBUTamine (DOBUTREX) 500 mg/250 mL (2,000 mcg/mL) infusion, 2 mcg/kg/min, IntraVENous, CONTINUOUS, Kamila Wilson MD, Last Rate: 3.1 mL/hr at 01/05/23 1157, 2 mcg/kg/min at 01/05/23 1157    insulin NPH (NOVOLIN N, HUMULIN N) injection 6 Units, 6 Units, SubCUTAneous, Q12H **AND** hydrocortisone Sod Succ (PF) (SOLU-CORTEF) injection 10 mg, 10 mg, IntraVENous, Q12H, Cathy Sheldon, SLICK    insulin lispro (HUMALOG) injection, , SubCUTAneous, AC&HS, Cathy Sheldon CNS, 7 Units at 01/05/23 1154    dextrose 10 % infusion 0-250 mL, 0-250 mL, IntraVENous, PRN, Cathy Sheldon, CNS    insulin lispro (HUMALOG) injection 5 Units, 5 Units, SubCUTAneous, TID WITH MEALS, Cathy Sheldon CNS, 5 Units at 01/05/23 1155    dextrose 10 % infusion 0-250 mL, 0-250 mL, IntraVENous, PRN, Cathy Sheldon, CNS    digoxin (LANOXIN) tablet 0.125 mg, 0.125 mg, Oral, DAILY, Colleen Astorga NP, 0.125 mg at 01/05/23 0847    mirtazapine (REMERON) tablet 7.5 mg, 7.5 mg, Oral, QHS, Leanne Bartlett MD, 7.5 mg at 01/04/23 2231    clopidogreL (PLAVIX) tablet 75 mg, 75 mg, Oral, DAILY, Marquis Randall Mccormcik NP, 75 mg at 01/05/23 0847    [Held by provider] bumetanide (BUMEX) injection 2 mg, 2 mg, IntraVENous, Q12H, Desire Dodd MD, 2 mg at 01/03/23 0837    [Held by provider] potassium chloride SR (KLOR-CON 10) tablet 40 mEq, 40 mEq, Oral, BID, Walker Shah MD, 40 mEq at 01/03/23 0838    pantoprazole (PROTONIX) tablet 40 mg, 40 mg, Oral, ACB, Henriquez Pair, DO, 40 mg at 01/05/23 0707    heparin (porcine) injection 5,000 Units, 5,000 Units, SubCUTAneous, Q12H, Henriquez Pair, DO, 5,000 Units at 01/05/23 0855    QUEtiapine (SEROquel) tablet 50 mg, 50 mg, Oral, QHS, Olene Dusky, Cisco Ng DO, 50 mg at 01/04/23 2232    lidocaine 4 % patch 1 Patch, 1 Patch, TransDERmal, Q24H, Floyd MOLINA MD, 1 Patch at 01/05/23 1556    balsam peru-castor oiL (VENELEX) ointment, , Topical, BID, Jessica MD Eda, Given at 01/05/23 0853    alteplase (CATHFLO) 1 mg in sterile water (preservative free) 1 mL injection, 1 mg, InterCATHeter, PRN, Kiya Kendall MD    bacitracin 500 unit/gram packet 1 Packet, 1 Packet, Topical, PRN, Floyd MOLINA MD    glucose chewable tablet 16 g, 4 Tablet, Oral, PRN, Floyd MOLINA MD    glucagon (GLUCAGEN) injection 1 mg, 1 mg, IntraMUSCular, PRN, Kiya Kendall MD    dextrose 10 % infusion 0-250 mL, 0-250 mL, IntraVENous, PRN, Kiya Kendall MD    senna-docusate (PERICOLACE) 8.6-50 mg per tablet 1 Tablet, 1 Tablet, Oral, BID PRN, Kiya Kendall MD, 1 Tablet at 12/26/22 8033    bisacodyL (DULCOLAX) suppository 10 mg, 10 mg, Rectal, QHS PRN, Floyd MOLINA MD    sodium chloride (NS) flush 5-40 mL, 5-40 mL, IntraVENous, Q8H, Walker Aponte MD, 10 mL at 01/05/23 1414    sodium chloride (NS) flush 5-40 mL, 5-40 mL, IntraVENous, PRN, Kiya Kendall MD, 10 mL at 12/28/22 0845    acetaminophen (TYLENOL) tablet 650 mg, 650 mg, Oral, Q6H PRN, 650 mg at 01/04/23 2232 **OR** acetaminophen (TYLENOL) suppository 650 mg, 650 mg, Rectal, Q6H PRN, Walker Campos MD    polyethylene glycol (MIRALAX) packet 17 g, 17 g, Oral, DAILY PRN, Kiya Kendall MD, 17 g at 12/26/22 0649    ondansetron (ZOFRAN ODT) tablet 4 mg, 4 mg, Oral, Q8H PRN **OR** ondansetron (ZOFRAN) injection 4 mg, 4 mg, IntraVENous, Q6H PRN, Floyd MOLINA MD, 4 mg at 12/24/22 0849    aspirin delayed-release tablet 81 mg, 81 mg, Oral, DAILY, Es Perez MD, 81 mg at 01/05/23 0847    ipratropium (ATROVENT) 21 mcg (0.03 %) nasal spray 2 Spray, 2 Spray, Both Nostrils, Q12H, MichelAleksandr MD, 2 Spray at 01/05/23 0852    tamsulosin (FLOMAX) capsule 0.4 mg, 0.4 mg, Oral, DAILY, Ranjit Pearson MD, 0.4 mg at 01/05/23 0847    sodium chloride (NS) flush 5-40 mL, 5-40 mL, IntraVENous, PRN, MD Travis Krause MD  [facility], [address] [address]  [facility]  [phone]      Raya Parikh MD

## 2023-01-05 NOTE — PROGRESS NOTES
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, LVEF 25-30%, stage C, NYHA class IIIA, unable to up-titrate GDMT for heart failure due to hypotension and orthostasis likely due to overdiuresis; now weaning dobutamine gtt as BP improves with hydration  Most likely etiology of cardiomyopathy: CAD +/- TRISTAN +/- inappropriate sinus tach +/- undergoing workup (cardiac MRI with ischemic CM, PYP equivocal, gammopathy and genetics pending)  In order to determine prognosis and timing of consideration for LVAD-DT evaluation; patient will need RHC off inotropes; will ask cardiology for Friday if we can wean dobutamine today, or Monday     RECOMMENDATIONS:  Decrease dobutamine to 2 mcg/kg/min and re-dose to current weight 51.5kg  Hypotension likely due to aggressive diuresis, improving off diuretics; hold diuretics today  Check NICOM today and continue to wean dobutamine today or through weekend   Check orthostatics again today if improved; if not, may need a bolus of NS/albumin  6 minute walk  Obtain MOCA test (screening test for LVAD/inotrope candidacy)  Inpatient sleep medicine consult (high risk for TRISTAN)  Continue digoxin 0.125mcg daily, check digoxin level daily (today 0.5)  Decrease solucortef to 10mg twice daily; NOTE: insulin needs adjustments with wean, d/w diabetes team  Uric acid level 6.8  Cannot tolerate beta-blockers due to dobutamine  Cannot tolerate ACEi/ARB/ARNi/MRA due to acute renal failure  Cannot tolerate SGLT2i due to borderline eGFR  Continue baby aspirin and plavix  DNR  Physical therapy/ambulate daily     IMPRESSION:  Acute on chronic combined systolic/diastolic  heart failure  Stage D, NYHA class IV symptoms  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, LVEF 25-30% and 23% (by cMRI 1/3/23)  Undergoing evaluation for cardiac amyloidosis with cMRI  Coronary artery disease  CAD s/p CABG x 2: further disease best managed medically due to small vessel size   Peripheral arterial disease  Bilateral hydronephrosis s/p donnelly  Cardiac risk factors:  HTN  HL  TRISTAN, STOP-BANG 4  DM2  CKD, stage 3      INTERVAL EVENTS:  Afebrile  -130s/50s, HR 90-100s  I/O neg positive 760cc, urine 700c, inaccurate  WBC 12.3 from 13.4  Hgb 9.6 from 10.4  BUN 77  Cr 1.34 from 1.57 from 1.65  K 4.2  TB 0.5  Lactic 1.5  NT 3906 from 4106  Procalcitonin 0.54     LIFE GOALS:  Lifestyle goals reviewed with the patient. Patient's personal goals include: TBD  Important upcoming milestones or family events: TBD  The patient identifies the following as important for living well: TBD     Patient assessed. High suspicion of sleep apnea due to the following reasons. Will refer for sleep evaluation. [x] Heart Failure  [] Hypertention  [x] Atrial Fibrillation  [] BMI > 30  [] History of stroke  [] Diabetes  [x] Heavy snoring  [] Witnessed apnea  [] Hypoxemia                    HPI:  71 y.o. male who was admitted via ED for worsening SOB, poor appetite, orthopnea and fatigue. Pmhx of CAD s/p CABG, PAD, DM 2, recent admission for HFmrEF earlier this month. Serial TTEs show progressive decline in EF since 3/2021 with the most recent EF at 25-30%. Recent LHC shows diffuse CAD and no interventions indicated. Patient had [place] recently and there is some thought to myocarditis or other etiology causing worsening HF. [facility] was consulted for further evaluation and management of HFrEF. CARDIAC IMAGING:  Echo 12/26/22    Left Ventricle: Severely reduced left ventricular systolic function with a visually estimated EF of 25 - 30%.  Left ventricle size is normal. Normal wall thickness. There are regional wall motion abnormalities. Grade II diastolic dysfunction with increased LAP. Right Ventricle: Moderately reduced systolic function. TAPSE is abnormal. TAPSE is 1.1 cm. Aortic Valve: Mild stenosis of the aortic valve. AV peak gradient is 13 mmHg. AV peak velocity is 1.8 m/s. Mitral Valve: Not well visualized. Moderate annular calcification at the posterior leaflet of the mitral valve. Mild to moderate regurgitation. Tricuspid Valve: Mildly elevated RVSP. Left Atrium: Left atrium is moderately dilated. 12/8/22    Left Ventricle: Moderately reduced left ventricular systolic function with a visually estimated EF of 35 - 40%. Severe hypokinesis of the following segments: mid anteroseptal, apical anterior, apical septal, apical inferior and apical lateral. Severe hypokinesis of the apex. Mitral Valve: Severely thickened leaflet, at the anterior and posterior leaflets. Severely calcified leaflet, at the anterior and posterior leaflets. Mild annular calcification of the mitral valve. Moderate regurgitation. Left Atrium: Left atrium is mildly dilated. Contrast used: Definity. limited study     EKG 12/22/22 ST, Biatria enlargement, marked ST abnormality     [facility] 12/6/22  1. Normal LVEDP  2. Severe native multivessel coronary artery disease  3. Patent LIMA to LAD and vein graft to distal RCA  4. Recurrent ISR in OM1 stent with now 60 to 70% restenosis  5. Recoil of left main and circumflex stent with now recurrent 40 to 50% stenosis. 6.  Progression of ostial left main disease now to about 60% stenosis  7. Progression of disease in jailed first marginal branch now with diffuse 90% stenosis  8.   High-grade stenosis in the mid to distal right potential femoral artery treated with 6 x 40 mm impact drug-coated balloon angioplasty to reduce the stenosis to less than 40%     NST        HEMODYNAMICS:  RHC not done  CPEST too ill   6MW too ill    PHYSICAL EXAM:  Visit Vitals  BP (!) 136/50 (BP 1 Location: Left upper arm, BP Patient Position: Sitting; Other (Comment)) Comment (BP Patient Position): post walking/activity   Pulse 98   Temp 98.1 °F (36.7 °C)   Resp 17   Ht 5' 9\" (1.753 m)   Wt 113 lb 8.6 oz (51.5 kg)   SpO2 100%   BMI 16.77 kg/m²     General: Patient is cachectic in no acute distress  HEENT: Unremarkable   Neck: Supple. No evidence of thyroid enlargements or lymphadenopathy. JVD: Cannot be appreciated   Lungs: Breath sounds are equal and clear bilaterally. No wheezes, rhonchi, or rales. Heart: Regular rate and rhythm with normal S1 and S2. No murmurs, gallops or rubs. Abdomen: Soft, no mass or tenderness. No organomegaly or hernia. Bowel sounds present. Genitourinary and rectal: deferred  Extremities: No cyanosis, clubbing, or edema. Neurologic: No focal sensory or motor deficits are noted. Grossly intact. Psychiatric: Awake, alert an doriented x 3. Appropriate mood and affect. Skin: Warm, dry and well perfused. REVIEW OF SYSTEMS:  General: Denies fever. Ear, nose and throat: Denies difficulty hearing, sinus problems, nosebleeds  Cardiovascular: see above in the interval history  Respiratory: Denies cough, wheezing, sputum production, hemoptysis.   Gastrointestinal: Denies heartburn, constipation, diarrhea, abdominal pain, nausea, blood in stool  Kidney and bladder: Denies painful urination, frequent urination  Musculoskeletal: Denies joint pain, muscle weakness  Skin and hair: Denies change in existing skin lesions    PAST MEDICAL HISTORY:  Past Medical History:   Diagnosis Date    CAD (coronary artery disease) 11/10/2016    NSTEMI & 2 stents    Deafness 10/28/2012    DM (diabetes mellitus) ([facility] [address]     Elevated cholesterol     Hypertension     NSTEMI (non-ST elevated myocardial infarction) ([facility] [address] 11/10/2016       PAST SURGICAL HISTORY:  Past Surgical History:   Procedure Laterality Date    COLONOSCOPY N/A 6/28/2018    COLONOSCOPY performed by Yazmin Garcia MD at [facility] ENDOSCOPY    HX APPENDECTOMY      [place] CARDIAC SURG PROCEDURE UNLIST  11/11/2016    2 stents       FAMILY HISTORY:  Family History   Problem Relation Age of Onset    Heart Disease Father     Heart Attack Father     Hypertension Mother     Elevated Lipids Brother     Elevated Lipids Brother     No Known Problems Sister     Elevated Lipids Brother     No Known Problems Son     No Known Problems Daughter     Anesth Problems Neg Hx        SOCIAL HISTORY:  Social History     Socioeconomic History    Marital status:    Tobacco Use    Smoking status: Never     Passive exposure: Never    Smokeless tobacco: Never   Vaping Use    Vaping Use: Never used   Substance and Sexual Activity    Alcohol use: Yes     Alcohol/week: 2.0 standard drinks     Types: 1 Cans of beer, 1 Shots of liquor per week     Comment: rarely    Drug use: No    Sexual activity: Yes     Social Determinants of Health     Financial Resource Strain: Medium Risk    Difficulty of Paying Living Expenses: Somewhat hard   Food Insecurity: Food Insecurity Present    Worried About Running Out of Food in the Last Year: Never true    Ran Out of Food in the Last Year: Often true       LABORATORY RESULTS:     Labs Latest Ref Rng & Units 1/5/2023 1/4/2023 1/4/2023 1/3/2023 1/2/2023 1/1/2023 12/31/2022   WBC 4.1 - 11.1 K/uL 12. 3(H) - 13. 4(H) 15. 3(H) 17. 3(H) 20. 8(H) 13. 6(H)   RBC 4.10 - 5.70 M/uL 4.24 - 4.55 4.39 4.37 4.51 4.29   Hemoglobin 12.1 - 17.0 g/dL 9.6(L) - 10. 4(L) 10. 2(L) 10. 0(L) 10. 2(L) 9.7(L)   Hematocrit 36.6 - 50.3 % 33. 4(L) - 35. 6(L) 33. 3(L) 33. 8(L) 34. 4(L) 33. 0(L)   MCV 80.0 - 99.0 FL 78. 8(L) - 78. 2(L) 75. 9(L) 77. 3(L) 76. 3(L) 76. 9(L)   Platelets 693 - 836 K/uL 297 - 319 282 305 356 330   Lymphocytes 12 - 49 % 9(L) - 4(L) 5(L) 4(L) - 3(L)   Monocytes 5 - 13 % 7 - 3(L) 3(L) 3(L) - 5   Eosinophils 0 - 7 % 2 - 0 0 0 - 0   Basophils 0 - 1 % 1 - 1 1 0 - 0   Albumin 3.5 - 5.0 g/dL 3.4(L) - 3.6 3.7 4.0 4.3 4.4   Calcium 8.5 - 10.1 MG/DL 9.9 10. 4(H) 10.7(H) 10. 2(H) 10. 3(H) 10. 6(H) 10. 5(H)   Glucose 65 - 100 mg/dL 75 - 127(H) 110(H) 256(H) 172(H) 315(H)   BUN 6 - 20 MG/DL 77(H) - 96(H) 101(H) 105(H) 106(H) 111(H)   Creatinine 0.70 - 1.30 MG/DL 1.34(H) - 1.57(H) 1.65(H) 1.93(H) 2.01(H) 2.30(H)   Sodium 136 - 145 mmol/L 133(L) - 135(L) 134(L) 137 138 133(L)   Potassium 3.5 - 5.1 mmol/L 4.2 - 4.4 4.4 4.6 4.3 3.6   TSH 0.36 - 3.74 uIU/mL - - - - - - -   PSA 0.01 - 4.0 ng/mL - - - - - - -   Some recent data might be hidden     Lab Results   Component Value Date/Time    TSH 2.12 12/27/2022 02:36 PM    TSH 4.80 (H) 12/06/2022 03:53 AM    TSH 5.39 (H) 10/12/2022 09:10 AM    TSH 3.53 02/03/2022 11:47 AM    TSH 5.790 (H) 11/21/2019 04:45 PM    TSH 3.08 06/22/2018 01:53 PM    TSH 4.250 05/26/2015 09:43 AM       ALLERGY:  No Known Allergies     CURRENT MEDICATIONS:    Current Facility-Administered Medications:     albuterol-ipratropium (DUO-NEB) 2.5 MG-0.5 MG/3 ML, 3 mL, Nebulization, Q6H PRN, Vira Mueller MD    DOBUTamine (DOBUTREX) 500 mg/250 mL (2,000 mcg/mL) infusion, 4 mcg/kg/min, IntraVENous, CONTINUOUS, Tom Rubin MD, Last Rate: 6.3 mL/hr at 01/04/23 1411, 4 mcg/kg/min at 01/04/23 1411    insulin lispro (HUMALOG) injection 5 Units, 5 Units, SubCUTAneous, TID WITH MEALS, Cathy Sheldon CNS, 5 Units at 01/05/23 0851    dextrose 10 % infusion 0-250 mL, 0-250 mL, IntraVENous, PRN, Cathy Sheldon CNS    insulin NPH (NOVOLIN N, HUMULIN N) injection 14 Units, 14 Units, SubCUTAneous, Q12H, 14 Units at 01/05/23 0851 **AND** hydrocortisone Sod Succ (PF) (SOLU-CORTEF) injection 25 mg, 25 mg, IntraVENous, Q12H, Lita Felty, Eluterio Collin I, MD, 25 mg at 01/05/23 0848    digoxin (LANOXIN) tablet 0.125 mg, 0.125 mg, Oral, DAILY, Colleen Astorga NP, 0.125 mg at 01/05/23 0847    mirtazapine (REMERON) tablet 7.5 mg, 7.5 mg, Oral, QHS, Lita Felty, Eluterio Collin I, MD, 7.5 mg at 01/04/23 2231    clopidogreL (PLAVIX) tablet 75 mg, 75 mg, Oral, DAILY, Ravin Mccormick, NP, 75 mg at 01/05/23 0847    [Held by provider] bumetanide (BUMEX) injection 2 mg, 2 mg, IntraVENous, Q12H, Desire Dodd MD, 2 mg at 01/03/23 0837    [Held by provider] potassium chloride SR (KLOR-CON 10) tablet 40 mEq, 40 mEq, Oral, BID, Sarah MOLINA MD, 40 mEq at 01/03/23 0838    pantoprazole (PROTONIX) tablet 40 mg, 40 mg, Oral, ACB, Christian Batter, DO, 40 mg at 01/05/23 0707    heparin (porcine) injection 5,000 Units, 5,000 Units, SubCUTAneous, Q12H, Christian Batter, DO, 5,000 Units at 01/05/23 0855    QUEtiapine (SEROquel) tablet 50 mg, 50 mg, Oral, QHS, Jeff, David G, DO, 50 mg at 01/04/23 2232    lidocaine 4 % patch 1 Patch, 1 Patch, TransDERmal, Q24H, Jordi Mederos MD, 1 Patch at 01/04/23 1832    balsam peru-castor oiL (VENELEX) ointment, , Topical, BID, Miriam Elizalde MD, Given at 01/05/23 0853    alteplase (CATHFLO) 1 mg in sterile water (preservative free) 1 mL injection, 1 mg, InterCATHeter, PRN, Sarah MOLINA MD    bacitracin 500 unit/gram packet 1 Packet, 1 Packet, Topical, PRN, Sarah MOLINA MD    glucose chewable tablet 16 g, 4 Tablet, Oral, PRN, Sarah MOLINA MD    glucagon (GLUCAGEN) injection 1 mg, 1 mg, IntraMUSCular, PRN, Jordi Mederos MD    dextrose 10 % infusion 0-250 mL, 0-250 mL, IntraVENous, PRN, Jordi Mederos MD    insulin lispro (HUMALOG) injection, , SubCUTAneous, AC&HS, Christian Batter, DO, 2 Units at 01/04/23 1833    senna-docusate (PERICOLACE) 8.6-50 mg per tablet 1 Tablet, 1 Tablet, Oral, BID PRN, Jordi Mederos MD, 1 Tablet at 12/26/22 9850    bisacodyL (DULCOLAX) suppository 10 mg, 10 mg, Rectal, QHS PRN, Walker Whitten MD    sodium chloride (NS) flush 5-40 mL, 5-40 mL, IntraVENous, Q8H, Walker Aponte MD, 10 mL at 01/05/23 0506    sodium chloride (NS) flush 5-40 mL, 5-40 mL, IntraVENous, PRN, Sarah MOLINA MD, 10 mL at 12/28/22 0845    acetaminophen (TYLENOL) tablet 650 mg, 650 mg, Oral, Q6H PRN, 650 mg at 01/04/23 2232 **OR** acetaminophen (TYLENOL) suppository 650 mg, 650 mg, Rectal, Q6H PRN, Walker Pratt MD    polyethylene glycol (MIRALAX) packet 17 g, 17 g, Oral, DAILY PRN, Imelda MOLINA MD, 17 g at 12/26/22 0649    ondansetron (ZOFRAN ODT) tablet 4 mg, 4 mg, Oral, Q8H PRN **OR** ondansetron (ZOFRAN) injection 4 mg, 4 mg, IntraVENous, Q6H PRN, Imelda MOLINA MD, 4 mg at 12/24/22 0849    aspirin delayed-release tablet 81 mg, 81 mg, Oral, DAILY, Rodrigo Ulrich MD, 81 mg at 01/05/23 0847    ipratropium (ATROVENT) 21 mcg (0.03 %) nasal spray 2 Spray, 2 Spray, Both Nostrils, Q12H, Ranjit Pearson MD, 2 [place] at 01/05/23 0852    tamsulosin (FLOMAX) capsule 0.4 mg, 0.4 mg, Oral, DAILY, Kacie Pearson MD, 0.4 mg at 01/05/23 0847    sodium chloride (NS) flush 5-40 mL, 5-40 mL, IntraVENous, PRN, Xavier Sweeney MD    PATIENT CARE TEAM:  Patient Care Team:  Marcin Gardner MD as PCP - General (Family Medicine)  Marcin Gardner MD as PCP - [facility]  Wagner Randall MD (Cardiovascular Disease Physician)  Dena Davis MD (Gastroenterology)  Almedia Gowers, MD (Cardiothoracic Surgery)  Mario Moreno MD (Cardiovascular Disease Physician)  Geneva Harvey MD (Nephrology)  Tino Campbell, RN as Care Transitions Nurse  Kianna Mitchell MD (Pulmonary Disease)     Thank you for allowing me to participate in this patient's care.     Roseann Arshad MD   [address] [place] [address], [address]  Phone: [phone]

## 2023-01-05 NOTE — DIABETES MGMT
[address] [place] [address]  DIABETES MANAGEMENT CONSULT    Consulted by  Madhuri Modi MD   for advanced nursing evaluation and care for inpatient blood glucose management. Evaluation and Action Plan   Anna Webber is a 71year old gentleman, with Type 2 Diabetes with a recent A1C of 6.5%, who was admitted with acute on chronic HFrEF and cardiogenic shock. He was admitted 2 weeks ago for similar complaint and antihyperglycemic agents were adjusted on discharge. Metformin was stopped due to decline in GFR and Jardiance switched to Chuy Huguenin (unclear why). He was compliant with his Edger Day, Chuy Huguenin and Glipizide up until day prior to this hospitalization. His glucose was significantly elevated at 458 on admission, s/t insulin resistance from acute HFrEF, elevated lactate and impaired perfusion. Low dose basal insulin was started and sufficient to control BG. There was a suspicion for myocarditis and hydrocortisone 50mg twice daily was started. Basal insulin was escalated to 48 units but with an exaggerated post-prandial glucose spike to over 400 daily. Steroids now weaning to off over the next several days. Given changes in steroid dosing over the next few days, we will switch to NPH basal as this mirrors the same duration as hydrocortisone. Inpatient BG goal is 140-180mg/dl. Management Rationale Action Plan   Medication   Basal needs This dose covers:    Diabetes: 8 units daily(0.15units/kg)  Steroids: 0.05 units/kg for every 10mg   hydrocortisone dose (5 units daily) 6 units NPH Q12 with each 10 mg hydrocortisone dose   Nutritional needs Using low sensitivity based on low   BMI 5 units Humalog/meal    Hold if patient is NPO or consumes less than 50% of carbohydrates on meal tray     Corrective insulin Using Normal Sensitivity based on steroid wean Normal Sensitivity ACHS   Additional Recommendations. POC glucose ACHS    Consistent carbohydrate diet (60 grams CHO/meal). Will talk to RD to see if he may benefit form a lower carbohydrate containing nutritional shake. Initial Presentation   Mary Alfonso is a 71 y.o. male who presented to the ED 12/22/22 with a 3-4 day c/o gradually worsening dyspnea, fatigue, chills, constipation and RLQ pain. He endorses difficulty voiding despite compliance with lasix. In the ED, he was hypoxic and started on supplemental O2 and diuresis. LAB: WBC 25.1, , GFR 25, Lac 5.2, Trop 1096, BNP 6905  CXR: Widespread interstitial opacities bilateral mid to lower lung opacities  concerning for edema and/or atypical infection. Small bilateral pleural  effusions. CT: CT revealed distended bladder  EKG: Sinus tachycardia   Biatrial enlargement   Rightward axis   Marked ST abnormality, possible inferior subendocardial injury   Marked ST abnormality, possible lateral subendocardial injury   Abnormal ECG   Did have a hospital admission for HFrEF 12/11/22-12/16/22    HX:   Past Medical History:   Diagnosis Date    CAD (coronary artery disease) 11/10/2016    NSTEMI & 2 stents    Deafness 10/28/2012    DM (diabetes mellitus) ([facility] [address]     Elevated cholesterol     Hypertension     NSTEMI (non-ST elevated myocardial infarction) ([facility] [address] 11/10/2016    CKD  CAD with CABG 2018    INITIAL DX:   Sepsis ([facility] [address] [A41.9]     Current Treatment     TX: IVF, Diuresis, Supplemental O2    Hospital Course   Clinical progress has been complicated by:   18/07: Initial admission with hospitalist service but with progressive hypoxia early requiring CCU transfer. Elevated troponin/BNP. BPH and hydronephrosis. Pt pan cultured, given dose of abx and dose of IVP lasix. Pt placed on BiPAP w/ improvement in oxygenation. 12/23: Seen by urology for bilateral hydronephrosis related to distended bladder. Maintain urinary catheter. 12/24: CVL placement   12/26: Decreased urine output, started on bumex and dobutamine gtt. Cardiology consult. Continue inotrope.     12/27: Advanced Heart Failure Consult. Started HF evaluation. Nephrology consult: Decreased bumex gtt  12/28: Mini pulse steroids per cardiology for treatment of myocarditis. Insulin gtt. 12/30: Transferred to hospitalist service. 1/3: Cardiac MRI: Ischemic CM  Diabetes History   Type 2 Diabetes  Ambulatory BG management provided by: PCP Beverley Beach MD    Diabetes-related Medical History  Acute complications  Acute hyperglycemia  Neurological complications  Peripheral neuropathy  Microvascular disease  Nephropathy  Macrovascular disease  CAD  Other associated conditions     CHF    Diabetes Medication History  Key Antihyperglycemic Medications               dapagliflozin (FARXIGA) 10 mg tab tablet (Taking) Take 1 Tablet by mouth daily. glipiZIDE (GLUCOTROL) 5 mg tablet (Taking) Take 1 tablet by mouth twice daily    Januvia 50 mg tablet (Taking) Take 1 tablet by mouth once daily             Diabetes self-management practices:   Eating pattern   Eats 3 small meals daily  [x] Breakfast  2 Eggs, Coffee  [x] Lunch   [place]  [x] Dinner   \" Food\" Bread, rice, lentils   [x] Bedtime  [place]  [x] Snacks   Afternoon snack  [x] Beverages  Water, Coffee  Physical activity pattern   Sedentary   Monitoring pattern  Does not check BG  Taking medications pattern  [x] Consistent administration  [x] Affordable  Social determinants of health impacting diabetes self-management practices   Concerned that you need to know more about how to stay healthy with diabetes  Overall evaluation:    [x] Achieving A1c target with drug therapy & self-care practices    Subjective   I took my medicine yesterday.      Objective   Physical exam  General Underweight male in mild distress/ill-appearing. Conversant and cooperative  Neuro  Alert, oriented   Vital Signs Visit Vitals  BP (!) 136/50 (BP 1 Location: Left upper arm, BP Patient Position: Sitting; Other (Comment))   Pulse 98   Temp 98.1 °F (36.7 °C)   Resp 17   Ht 5' 9\" (1.753 m)   Wt 51.5 kg (113 lb 8.6 oz)   SpO2 100%   BMI 16.77 kg/m²     Skin  Warm and dry. Acanthosis noted along neckline. No lipohypertrophy or lipoatrophy noted at injection sites   Heart   Regular rate and rhythm. No murmurs, rubs or gallops  Lungs  Clear to auscultation without rales or rhonchi  Extremities No foot wounds        Laboratory  Recent Labs     23  0022 23  0231 23  0233   GLU 75 127* 110*   AGAP 8 7 9   WBC 12.3* 13.4* 15.3*   CREA 1.34* 1.57* 1.65*   AST 50* 47* 43*   * 122* 122*         Factors impacting BG management  Factor Dose Comments   Nutrition:  Standard meals     60 grams/meal      Combined non-ischemic and ischemic cardiomyopathy with HF EF 20-30%  Dobutamine   BNP 3906    Suspicion of myocarditis Started on IV steroid trial         Other:   Kidney function TANNER- resolved  GFR 44    Acute Liver Injury Elevated liver enzymes  S/t hypotension.        Blood glucose pattern    Significant diabetes-related events over the past 24-72 hours  A1C  6.5% 22 (down from 7.0 10/12/22)  Fasting B (75 on [place] labs)  Pre-prandial: 144-383  Basal: 48 units daily, reducing and switching to NPH:14 units NPH Q12  Bolus: 5 units Humalog/meal. 8 units given at dinner last night  Correction: 12 units in the last 24h  Hydrocortisone weaned to 25mg this am and again to 10mg Q12-  and taper to off over the next 4-6 days per [facility]  Plan for [facility]HABILITATION test (screening test for LVAD/inotrope candidacy)  For breakfast had 1 piece of toast (15CHO), Juice (15g CHO), Fruit Cup (15g CHO), Ensure (51g CHO)- total of 96 g CHO for breakfast      Assessment and Nursing Intervention   Nursing Diagnosis Risk for unstable blood glucose pattern   Nursing Intervention Domain 2310 Decision-making Support   Nursing Interventions Examined current inpatient diabetes/blood glucose control   Explored factors facilitating and impeding inpatient management  Explored corrective strategies with patient and responsible inpatient provider   Informed patient of rational for insulin strategy while hospitalized     Nursing Diagnosis 42384 Ineffective Health Management   Nursing Intervention Domain 8276 Decision-making Support   Nursing Interventions Identified diabetes self-management practices impeding diabetes control  Discussed diabetes survival skills related to  Healthy Plate eating plan; given handouts  Role of physical activity in improving insulin sensitivity and action  Procedure for blood glucose monitoring & options for low-cost products  Medications plan at discharge     Billing Code(s)   [x] [phone]    Before making these care recommendations, I personally reviewed the hospitalization record, including notes, laboratory & diagnostic data and current medications, and examined the patient at the bedside (circumstances permitting) before making care recommendations. More than fifty (50) percent of the time was spent in patient counseling and/or care coordination.   Total minutes: 25    SLICK Martínez  Diabetes Clinical Nurse Specialist  [facility]  Access via reportbrain

## 2023-01-05 NOTE — PROGRESS NOTES
[address] [facility]  Hospitalist Group                                                                                          Hospitalist Progress Note  Bladimir Tee MD  Answering service: [phone] OR [phone] from in house phone        Date of Service:  2023  NAME:  Sammie Schmitz  :  1950  MRN:  [id number]      Admission Summary:   Sammie Schmitz is a 71 y.o. male who presents with progressively worsening shortness of breath for past several days. It has gotten worse to the point that he can only ambulate a few steps due to severe dyspnea and near syncope. Patient also noted abdominal distention which is increasing. Patient with known history of cardiomyopathy and recently admitted for CHF exacerbation a week ago. On presentation to the ER, chest x-ray shows diffuse airspace disease atypical infection versus edema. Also patient was noted to have elevated troponin and BNP. Patient was further evaluated by CT abdomen and pelvis which shows massively distended bladder with some bilateral hydronephrosis, subsequently Vasquez was placed. Patient also with significant leukocytosis as well as tachycardic and elevated lactic acid level and subsequently code sepsis was called. Hospitalist service requested admit the patient for further evaluation management.        Interval history / Subjective:     Sitting in chair denies any shortness of breath discussed with wife daughter at bedside  Patient seen by advanced heart failure team Dr. Vale Flowers reviewed recommendation     Assessment & Plan:      Acute on chronic systolic heart failure exacerbation-EF 25%  Concern for myocarditis  Cardiogenic shock-ongoing on dobutamine drip  Lactic acidosis  Coronary direct artery disease CAD status post CABG in 2018, PCI  -Cardiology and advanced heart failure following, per their notation suggestion of alternative mechanism to ischemia.   -Continue IV dobutamine  , probably will be long term for home - need to discuss with cardiology   -Bumex on hold today   - Unable to tolerate GDMT due to hypotension  -Viral and immune panel ordered through advanced heart failure services  -Continue hydrocortisone, for presumed myocarditis. Per cardiology will initiate taper down. Report  - s/p cardiac MRI, no sign of infiltrative process. Per Dr. Shayy White likely no benefit for PCI      Acute kidney injury on CKD stage III-improving  -Holding Bumex IV  -Nephrology following  -I's and O's     Type 2 diabetes -A1c 6.5%  -Continue 48 of Lantus, sliding scale insulin, and increase to 5 U with meals   -POCT glucose checks and hypoglycemia protocol   -Will improve with hydrocortisone wean   -Consult DTC     Leukocytosis - BLA71.5   - No localizing sign of infection   - UA negative, CXR with interstitial changes, no new infiltrate  - Pro calcitonin reassuring  - ?secondary to steroids.   - Monitor daily     Insomnia, hospital delirium-continue Seroquel at bedtime, DC benadryl given age, and possible worsening urinary retention. Continue mirtazapine   Hypokalemia-continue potassium supplementation, check daily  BPH-continue tamsulosin, failed void trial 12/31, consider repeating in a few days when off benadryl   PAD, status post right SFA PTCA, follow-up with Dr. Shayy White in 2 to 3 months          Code status: DNR  Prophylaxis: heparin   Care Plan discussed with: pt, RN   Anticipated Disposition: greater than 48 hours, needs to be weaned off dobutamine vs. Long term dobutamine.       Hospital Problems  Date Reviewed: 12/5/2022            Codes Class Noted POA    Systolic CHF, acute on chronic [facility]) ICD-10-CM: I50.23  ICD-9-CM: 428.23, 428.0  12/29/2022 Yes        Receiving inotropic medication ICD-10-CM: Z79.899  ICD-9-CM: V49.89  12/29/2022 Unknown        Sepsis ([facility] [address] ICD-10-CM: A41.9  ICD-9-CM: 038.9, 995.91  12/22/2022 Unknown       Review of Systems:   A comprehensive review of systems was negative except for that written in the HPI.       Vital Signs:    Last 24hrs VS reviewed since prior progress note. Most recent are:  Visit Vitals  BP (!) 116/45 (BP 1 Location: Left upper arm, BP Patient Position: At rest)   Pulse 86   Temp 97.4 °F (36.3 °C)   Resp 19   Ht 5' 9\" (1.753 m)   Wt 51.5 kg (113 lb 8.6 oz)   SpO2 100%   BMI 16.77 kg/m²         Intake/Output Summary (Last 24 hours) at 1/5/2023 1317  Last data filed at 1/5/2023 1156  Gross per 24 hour   Intake 1075.18 ml   Output 1200 ml   Net -124.82 ml          Physical Examination:     I had a face to face encounter with this patient and independently examined them on 1/5/2023 as outlined below:          Constitutional:  No acute distress, cooperative, pleasant, frail elderly    ENT:  Oral mucosa dry  oropharynx benign. Resp:  CTA bilaterally. No wheezing/rhonchi/rales. No accessory muscle use. CV:  Regular rhythm, normal rate, no murmurs, gallops, rubs    GI:  Soft, non distended, non tender. normoactive bowel sounds, no hepatosplenomegaly     Musculoskeletal:  No edema, warm, 2+ pulses throughout    Neurologic:  Moves all extremities.   Awake and alert, CN II-XII reviewed            Data Review:    Review and/or order of clinical lab test  Review and/or order of tests in the radiology section of CPT  Review and/or order of tests in the medicine section of CPT      Labs:     Recent Labs     01/05/23  0022 01/04/23  0231   WBC 12.3* 13.4*   HGB 9.6* 10.4*   HCT 33.4* 35.6*    319       Recent Labs     01/05/23  0023 01/05/23  0022 01/04/23  0231 01/03/23  0233   NA  --  133* 135* 134*   K  --  4.2 4.4 4.4   CL  --  98 102 98   CO2  --  27 26 27   BUN  --  77* 96* 101*   CREA  --  1.34* 1.57* 1.65*   GLU  --  75 127* 110*   CA  --  9.9 10.4*  10.7* 10.2*   MG 2.5*  --  2.8* 2.7*   PHOS 3.4  --  3.8 4.8*       Recent Labs     01/05/23  0022 01/04/23  0231 01/03/23 0233   * 122* 122*   * 184* 171*   TBILI 0.5 0.6 0.7   TP 6.8 7.8 7.5   ALB 3.4* 3.6 3.7   GLOB 3.4 4.2* 3.8       No results for input(s): INR, PTP, APTT, INREXT, INREXT in the last 72 hours. Recent Labs     01/04/23  0231   TIBC 277   PSAT 29   FERR 463*        Lab Results   Component Value Date/Time    Folate 10.5 07/03/2018 09:24 AM        No results for input(s): PH, PCO2, PO2 in the last 72 hours.   Recent Labs     01/04/23  0231   CPK 87       Lab Results   Component Value Date/Time    Cholesterol, total 143 10/12/2022 09:10 AM    HDL Cholesterol 49 10/12/2022 09:10 AM    LDL, calculated 41.4 10/12/2022 09:10 AM    Triglyceride 263 (H) 10/12/2022 09:10 AM    CHOL/HDL Ratio 2.9 10/12/2022 09:10 AM     Lab Results   Component Value Date/Time    Glucose (POC) 371 (H) 01/05/2023 11:46 AM    Glucose (POC) 122 (H) 01/05/2023 06:55 AM    Glucose (POC) 146 (H) 01/04/2023 09:05 PM    Glucose (POC) 144 (H) 01/04/2023 05:51 PM    Glucose (POC) 383 (H) 01/04/2023 11:51 AM     Lab Results   Component Value Date/Time    Color YELLOW/STRAW 01/01/2023 09:13 AM    Appearance CLEAR 01/01/2023 09:13 AM    Specific gravity 1.013 01/01/2023 09:13 AM    Specific gravity 1.020 05/03/2014 08:18 AM    pH (UA) 5.5 01/01/2023 09:13 AM    Protein 30 (A) 01/01/2023 09:13 AM    Glucose Negative 01/01/2023 09:13 AM    Ketone Negative 01/01/2023 09:13 AM    Bilirubin Negative 01/01/2023 09:13 AM    Urobilinogen 1.0 01/01/2023 09:13 AM    Nitrites Negative 01/01/2023 09:13 AM    Leukocyte Esterase Negative 01/01/2023 09:13 AM    Epithelial cells FEW 01/01/2023 09:13 AM    Bacteria Negative 01/01/2023 09:13 AM    WBC 0-4 01/01/2023 09:13 AM    RBC 0-5 01/01/2023 09:13 AM         Medications Reviewed:     Current Facility-Administered Medications   Medication Dose Route Frequency    albuterol-ipratropium (DUO-NEB) 2.5 MG-0.5 MG/3 ML  3 mL Nebulization Q6H PRN    DOBUTamine (DOBUTREX) 500 mg/250 mL (2,000 mcg/mL) infusion  2 mcg/kg/min IntraVENous CONTINUOUS    insulin NPH (NOVOLIN N, HUMULIN N) injection 6 Units  6 Units SubCUTAneous Q12H    And hydrocortisone Sod Succ (PF) (SOLU-CORTEF) injection 10 mg  10 mg IntraVENous Q12H    insulin lispro (HUMALOG) injection   SubCUTAneous AC&HS    dextrose 10 % infusion 0-250 mL  0-250 mL IntraVENous PRN    insulin lispro (HUMALOG) injection 5 Units  5 Units SubCUTAneous TID WITH MEALS    dextrose 10 % infusion 0-250 mL  0-250 mL IntraVENous PRN    digoxin (LANOXIN) tablet 0.125 mg  0.125 mg Oral DAILY    mirtazapine (REMERON) tablet 7.5 mg  7.5 mg Oral QHS    clopidogreL (PLAVIX) tablet 75 mg  75 mg Oral DAILY    [Held by provider] bumetanide (BUMEX) injection 2 mg  2 mg IntraVENous Q12H    [Held by provider] potassium chloride SR (KLOR-CON 10) tablet 40 mEq  40 mEq Oral BID    pantoprazole (PROTONIX) tablet 40 mg  40 mg Oral ACB    heparin (porcine) injection 5,000 Units  5,000 Units SubCUTAneous Q12H    QUEtiapine (SEROquel) tablet 50 mg  50 mg Oral QHS    lidocaine 4 % patch 1 Patch  1 Patch TransDERmal Q24H    balsam peru-castor oiL (VENELEX) ointment   Topical BID    alteplase (CATHFLO) 1 mg in sterile water (preservative free) 1 mL injection  1 mg InterCATHeter PRN    bacitracin 500 unit/gram packet 1 Packet  1 Packet Topical PRN    glucose chewable tablet 16 g  4 Tablet Oral PRN    glucagon (GLUCAGEN) injection 1 mg  1 mg IntraMUSCular PRN    dextrose 10 % infusion 0-250 mL  0-250 mL IntraVENous PRN    senna-docusate (PERICOLACE) 8.6-50 mg per tablet 1 Tablet  1 Tablet Oral BID PRN    bisacodyL (DULCOLAX) suppository 10 mg  10 mg Rectal QHS PRN    sodium chloride (NS) flush 5-40 mL  5-40 mL IntraVENous Q8H    sodium chloride (NS) flush 5-40 mL  5-40 mL IntraVENous PRN    acetaminophen (TYLENOL) tablet 650 mg  650 mg Oral Q6H PRN    Or    acetaminophen (TYLENOL) suppository 650 mg  650 mg Rectal Q6H PRN    polyethylene glycol (MIRALAX) packet 17 g  17 g Oral DAILY PRN    ondansetron (ZOFRAN ODT) tablet 4 mg  4 mg Oral Q8H PRN    Or    ondansetron (ZOFRAN) injection 4 mg  4 mg IntraVENous Q6H PRN    aspirin delayed-release tablet 81 mg  81 mg Oral DAILY    ipratropium (ATROVENT) 21 mcg (0.03 %) nasal spray 2 Spray  2 Spray Both Nostrils Q12H    tamsulosin (FLOMAX) capsule 0.4 mg  0.4 mg Oral DAILY    sodium chloride (NS) flush 5-40 mL  5-40 mL IntraVENous PRN     ______________________________________________________________________  EXPECTED LENGTH OF STAY: 5d 0h  ACTUAL LENGTH OF STAY:          Mina Samuel MD

## 2023-01-05 NOTE — PROGRESS NOTES
NAME: Jim Warren        :  1950        MRN:  [id number]         Assessment:    TANNER on CKD stage 3a to 3b, in the setting of urinary retention, poor hemodynamics and low EF at risk for CRS  Urinary retention/hydro s/p donnelly  A on chronic biventricular CHF; EF 20-25%, 23% by cMRI  Anemia  Borderline hypotension  Hypercalcemia started on high dose po vit D on ; off now. High Mg     TANNER resolved. CR back to baseline at 1.57 to 1.34 today. Mag is high, but better. Calcium is down to normal.  SPEP is negative for M spike. Serum FLC ratio is mildly elevated at 2.1, which is likely due to CKD    Plan:  Continue to hold IV diuretics  Monitor weight and edema. Will resume diuretics orally, if he starts to develop symptoms of CHF   continue IV dobutamine per cardiology/CHF team.  The dose has been slowly down titrated  continue OFF vit D; encourage increase mobility as possible  Being worked up and considered for possible LVAD  Donnelly reinserted and to be left in for now  Continue Flomax  Will need outpatient urology follow-up for voiding trial    Discussed with patient and wife. Discussed with patient's son yesterday over the phone    Subjective:   Out of bed in chair. Feels okay. No acute complaints    Objective:     VITALS:   Last 24hrs VS reviewed since prior progress note.  Most recent are:  Visit Vitals  BP (!) 116/45 (BP 1 Location: Left upper arm, BP Patient Position: At rest)   Pulse 86   Temp 97.4 °F (36.3 °C)   Resp 19   Ht 5' 9\" (1.753 m)   Wt 51.5 kg (113 lb 8.6 oz)   SpO2 100%   BMI 16.77 kg/m²       Intake/Output Summary (Last 24 hours) at 2023 1254  Last data filed at 2023 1156  Gross per 24 hour   Intake 1075.18 ml   Output 1200 ml   Net -124.82 ml        Telemetry Reviewed:     PHYSICAL EXAM:  General: Elderly, frail, NAD  Clear  Regular rate rhythm  No significant edema  AOx3  Donnelly +      Lab Data Reviewed: (see below)    Medications Reviewed: (see below)    PMH/SH reviewed - no change compared to H&P  ________________________________________________________________________  ________________________________________________________________________  Thomasenia Soulier, MD     Procedures: see electronic medical records for all procedures/Xrays and details which  were not copied into this note but were reviewed prior to creation of Plan. LABS:  Recent Labs     01/05/23 0022 01/04/23 0231   WBC 12.3* 13.4*   HGB 9.6* 10.4*   HCT 33.4* 35.6*    319       Recent Labs     01/05/23 0023 01/05/23 0022 01/04/23 0231 01/03/23 0233   NA  --  133* 135* 134*   K  --  4.2 4.4 4.4   CL  --  98 102 98   CO2  --  27 26 27   BUN  --  77* 96* 101*   CREA  --  1.34* 1.57* 1.65*   GLU  --  75 127* 110*   CA  --  9.9 10.4*  10.7* 10.2*   MG 2.5*  --  2.8* 2.7*   PHOS 3.4  --  3.8 4.8*       Recent Labs     01/05/23 0022 01/04/23 0231 01/03/23 0233   * 184* 171*   TP 6.8 7.8 7.5   ALB 3.4* 3.6 3.7   GLOB 3.4 4.2* 3.8       No results for input(s): INR, PTP, APTT, INREXT, INREXT in the last 72 hours.    Recent Labs     01/04/23 0231   TIBC 277   PSAT 29   FERR 463*        Lab Results   Component Value Date/Time    Folate 10.5 07/03/2018 09:24 AM         MEDICATIONS:  Current Facility-Administered Medications   Medication Dose Route Frequency    albuterol-ipratropium (DUO-NEB) 2.5 MG-0.5 MG/3 ML  3 mL Nebulization Q6H PRN    DOBUTamine (DOBUTREX) 500 mg/250 mL (2,000 mcg/mL) infusion  2 mcg/kg/min IntraVENous CONTINUOUS    insulin NPH (NOVOLIN N, HUMULIN N) injection 6 Units  6 Units SubCUTAneous Q12H    And    hydrocortisone Sod Succ (PF) (SOLU-CORTEF) injection 10 mg  10 mg IntraVENous Q12H    insulin lispro (HUMALOG) injection   SubCUTAneous AC&HS    dextrose 10 % infusion 0-250 mL  0-250 mL IntraVENous PRN    insulin lispro (HUMALOG) injection 5 Units  5 Units SubCUTAneous TID WITH MEALS    dextrose 10 % infusion 0-250 mL  0-250 mL IntraVENous PRN    digoxin (LANOXIN) tablet 0.125 mg  0.125 mg Oral DAILY    mirtazapine (REMERON) tablet 7.5 mg  7.5 mg Oral QHS    clopidogreL (PLAVIX) tablet 75 mg  75 mg Oral DAILY    [Held by provider] bumetanide (BUMEX) injection 2 mg  2 mg IntraVENous Q12H    [Held by provider] potassium chloride SR (KLOR-CON 10) tablet 40 mEq  40 mEq Oral BID    pantoprazole (PROTONIX) tablet 40 mg  40 mg Oral ACB    heparin (porcine) injection 5,000 Units  5,000 Units SubCUTAneous Q12H    QUEtiapine (SEROquel) tablet 50 mg  50 mg Oral QHS    lidocaine 4 % patch 1 Patch  1 Patch TransDERmal Q24H    balsam peru-castor oiL (VENELEX) ointment   Topical BID    alteplase (CATHFLO) 1 mg in sterile water (preservative free) 1 mL injection  1 mg InterCATHeter PRN    bacitracin 500 unit/gram packet 1 Packet  1 Packet Topical PRN    glucose chewable tablet 16 g  4 Tablet Oral PRN    glucagon (GLUCAGEN) injection 1 mg  1 mg IntraMUSCular PRN    dextrose 10 % infusion 0-250 mL  0-250 mL IntraVENous PRN    senna-docusate (PERICOLACE) 8.6-50 mg per tablet 1 Tablet  1 Tablet Oral BID PRN    bisacodyL (DULCOLAX) suppository 10 mg  10 mg Rectal QHS PRN    sodium chloride (NS) flush 5-40 mL  5-40 mL IntraVENous Q8H    sodium chloride (NS) flush 5-40 mL  5-40 mL IntraVENous PRN    acetaminophen (TYLENOL) tablet 650 mg  650 mg Oral Q6H PRN    Or    acetaminophen (TYLENOL) suppository 650 mg  650 mg Rectal Q6H PRN    polyethylene glycol (MIRALAX) packet 17 g  17 g Oral DAILY PRN    ondansetron (ZOFRAN ODT) tablet 4 mg  4 mg Oral Q8H PRN    Or    ondansetron (ZOFRAN) injection 4 mg  4 mg IntraVENous Q6H PRN    aspirin delayed-release tablet 81 mg  81 mg Oral DAILY    ipratropium (ATROVENT) 21 mcg (0.03 %) nasal spray 2 Spray  2 Spray Both Nostrils Q12H    tamsulosin (FLOMAX) capsule 0.4 mg  0.4 mg Oral DAILY    sodium chloride (NS) flush 5-40 mL  5-40 mL IntraVENous PRN

## 2023-01-05 NOTE — PROGRESS NOTES
Problem: Mobility Impaired (Adult and Pediatric)  Goal: *Therapy Goal (Edit Goal, Insert Text)  Description: FUNCTIONAL STATUS PRIOR TO ADMISSION: Patient was independent and active without use of DME. Patient is currently driving. Patient is independent with ADLs and IADLs. HOME SUPPORT PRIOR TO ADMISSION: The patient lived with his wife, but did not require assist.    Physical Therapy Goals  Continued 1/4/2023  1. Patient will move from supine to sit and sit to supine, scoot up and down, and roll side to side in bed with modified independence within 7 day(s). 2.  Patient will transfer from bed to chair and chair to bed with modified independence using the least restrictive device within 7 day(s). 3.  Patient will perform sit to stand with modified independence within 7 day(s). 4.  Patient will ambulate with modified independence for 150 feet with the least restrictive device within 7 day(s). 5.  Patient will ascend/descend 13 stairs with single handrail(s) with modified independence within 7 day(s). Initiated 12/24/2022  1. Patient will move from supine to sit and sit to supine, scoot up and down, and roll side to side in bed with modified independence within 7 day(s). 2.  Patient will transfer from bed to chair and chair to bed with modified independence using the least restrictive device within 7 day(s). 3.  Patient will perform sit to stand with modified independence within 7 day(s). 4.  Patient will ambulate with modified independence for 150 feet with the least restrictive device within 7 day(s). 5.  Patient will ascend/descend 13 stairs with single handrail(s) with modified independence within 7 day(s).     Outcome: Progressing Towards Goal     PHYSICAL THERAPY TREATMENT  Patient: Darshana Light (76 y.o. male)  Date: 1/5/2023  Diagnosis: Sepsis ([facility] [A41.9] <principal problem not specified>      Precautions: Fall  Chart, physical therapy assessment, plan of care and goals were reviewed. ASSESSMENT  Patient continues with skilled PT services and is progressing towards goals. Patient received in bed with family at bedside but agreeable to treatment. MAP started out slightly low when sitting however patient is asymptomatic and proceeded with treatment. Patient able to tolerate increased mobility today as compared to last session. Attempted 6MWT today. He was unable to complete the full 6 minutes, he ambulated for 4 minutes and 30 seconds on second trial. His initial O2  saturation was 100  % and his baseline heart rate was 97  BPM. He walked from room to ravi at a distance of  150. His post O2  saturation was  100 % and his post walk heart rate was 98  BPM. BP improved after first gait trial and even more so after second. Patient had no s/s of respiratory distress, no SOB, HR remained stable. Patient remained sitting up in chair post session. Alerted RN , MD entered room at end of session. Continue to recommend IPR at discharge. Will continue to follow. 01/05/23 0952 01/05/23 0954 01/05/23 0957   Vital Signs   Pulse (Heart Rate) 93 95 98   BP (!) 115/40 (!) 100/31 (!) 98/35   MAP (Calculated) 65 (!) 54 (!) 56   BP 1 Location Left upper arm Left upper arm Left upper arm   BP 1 Method Automatic Automatic Automatic   BP Patient Position Semi fowlers Sitting Standing   O2 Sat (%)  --  100 % 100 %        01/05/23 1003 01/05/23 1015   Vital Signs   Pulse (Heart Rate) 96 98   BP (!) 124/45 (!) 136/50   MAP (Calculated) 71 79   BP 1 Location Left upper arm Left upper arm   BP 1 Method Automatic Automatic   BP Patient Position Sitting; Other (Comment)  (post gait training) Sitting; Other (Comment)  (post walking/activity)   O2 Sat (%) 100 % 100 %       Current Level of Function Impacting Discharge (mobility/balance): supervision for bed mobility, CGA for transfers, CGA/min A for gait training with RW up to 150 ft in one trial. Constant support when standing    Other factors to consider for discharge: high PLOF         PLAN :  Patient continues to benefit from skilled intervention to address the above impairments. Continue treatment per established plan of care. to address goals. Recommendation for discharge: (in order for the patient to meet his/her long term goals)  Therapy 3 hours per day 5-7 days per week    This discharge recommendation:  Has been made in collaboration with the attending provider and/or case management    IF patient discharges home will need the following DME: to be determined (TBD)       SUBJECTIVE:   Patient stated I feel good today.     OBJECTIVE DATA SUMMARY:   Critical Behavior:  Neurologic State: Alert  Orientation Level: Oriented X4  Cognition: Appropriate for age attention/concentration  Safety/Judgement: Awareness of environment, Fall prevention, Insight into deficits  Functional Mobility Training:  Bed Mobility:  Rolling: Supervision  Supine to Sit: Supervision              Transfers:  Sit to Stand: Contact guard assistance  Stand to Sit: Contact guard assistance              Balance:  Sitting: Intact; Without support  Standing: Impaired  Standing - Static: Constant support;Good  Standing - Dynamic : Constant support; Fair  Ambulation/Gait Training:  Distance (ft): 75 Feet (ft) (then 150)  Assistive Device: Walker, rolling;Gait belt  Ambulation - Level of Assistance: Contact guard assistance;Minimal assistance        Gait Abnormalities: Decreased step clearance; Path deviations; Shuffling gait        Base of Support: Widened;Center of gravity altered  Stance: Weight shift  Speed/Bette: Slow;Pace decreased (<100 feet/min); Shuffled  Step Length: Right shortened;Left shortened            Pain Rating:  No pain    Activity Tolerance:   Fair    After treatment patient left in no apparent distress:   Sitting in chair, Call bell within reach, Bed / chair alarm activated, and Caregiver / family present    COMMUNICATION/COLLABORATION:   The patients plan of care was discussed with: Occupational therapist and Registered nurse.      Jaimie Houston, PT   Time Calculation: 38 mins

## 2023-01-06 LAB
ALBUMIN SERPL-MCNC: 3 G/DL (ref 3.5–5)
ALBUMIN/GLOB SERPL: 0.9 (ref 1.1–2.2)
ALP SERPL-CCNC: 179 U/L (ref 45–117)
ALT SERPL-CCNC: 116 U/L (ref 12–78)
ANION GAP SERPL CALC-SCNC: 6 MMOL/L (ref 5–15)
AST SERPL-CCNC: 59 U/L (ref 15–37)
BASOPHILS # BLD: 0.1 K/UL (ref 0–0.1)
BASOPHILS NFR BLD: 1 % (ref 0–1)
BILIRUB SERPL-MCNC: 0.4 MG/DL (ref 0.2–1)
BNP SERPL-MCNC: 3483 PG/ML
BUN SERPL-MCNC: 56 MG/DL (ref 6–20)
BUN/CREAT SERPL: 46 (ref 12–20)
CALCIUM SERPL-MCNC: 9.4 MG/DL (ref 8.5–10.1)
CHLORIDE SERPL-SCNC: 102 MMOL/L (ref 97–108)
CO2 SERPL-SCNC: 25 MMOL/L (ref 21–32)
CREAT SERPL-MCNC: 1.21 MG/DL (ref 0.7–1.3)
DIFFERENTIAL METHOD BLD: ABNORMAL
DIGOXIN SERPL-MCNC: 0.7 NG/ML (ref 0.9–2)
EOSINOPHIL # BLD: 0.2 K/UL (ref 0–0.4)
EOSINOPHIL NFR BLD: 3 % (ref 0–7)
ERYTHROCYTE [DISTWIDTH] IN BLOOD BY AUTOMATED COUNT: 21.3 % (ref 11.5–14.5)
GLOBULIN SER CALC-MCNC: 3.5 G/DL (ref 2–4)
GLUCOSE BLD STRIP.AUTO-MCNC: 144 MG/DL (ref 65–117)
GLUCOSE BLD STRIP.AUTO-MCNC: 281 MG/DL (ref 65–117)
GLUCOSE BLD STRIP.AUTO-MCNC: 74 MG/DL (ref 65–117)
GLUCOSE SERPL-MCNC: 177 MG/DL (ref 65–100)
HCT VFR BLD AUTO: 31.3 % (ref 36.6–50.3)
HGB BLD-MCNC: 9 G/DL (ref 12.1–17)
IMM GRANULOCYTES # BLD AUTO: 0.1 K/UL (ref 0–0.04)
IMM GRANULOCYTES NFR BLD AUTO: 1 % (ref 0–0.5)
LACTATE SERPL-SCNC: 1.3 MMOL/L (ref 0.4–2)
LYMPHOCYTES # BLD: 0.9 K/UL (ref 0.8–3.5)
LYMPHOCYTES NFR BLD: 11 % (ref 12–49)
MAGNESIUM SERPL-MCNC: 2.3 MG/DL (ref 1.6–2.4)
MCH RBC QN AUTO: 23.1 PG (ref 26–34)
MCHC RBC AUTO-ENTMCNC: 28.8 G/DL (ref 30–36.5)
MCV RBC AUTO: 80.3 FL (ref 80–99)
MONOCYTES # BLD: 0.6 K/UL (ref 0–1)
MONOCYTES NFR BLD: 8 % (ref 5–13)
NEUTS SEG # BLD: 6 K/UL (ref 1.8–8)
NEUTS SEG NFR BLD: 76 % (ref 32–75)
NRBC # BLD: 0 K/UL (ref 0–0.01)
NRBC BLD-RTO: 0 PER 100 WBC
PHOSPHATE SERPL-MCNC: 2.3 MG/DL (ref 2.6–4.7)
PLATELET # BLD AUTO: 247 K/UL (ref 150–400)
PMV BLD AUTO: 11.8 FL (ref 8.9–12.9)
POTASSIUM SERPL-SCNC: 4.3 MMOL/L (ref 3.5–5.1)
PROCALCITONIN SERPL-MCNC: 0.38 NG/ML
PROT SERPL-MCNC: 6.5 G/DL (ref 6.4–8.2)
RBC # BLD AUTO: 3.9 M/UL (ref 4.1–5.7)
RBC MORPH BLD: ABNORMAL
SERVICE CMNT-IMP: ABNORMAL
SERVICE CMNT-IMP: ABNORMAL
SERVICE CMNT-IMP: NORMAL
SODIUM SERPL-SCNC: 133 MMOL/L (ref 136–145)
WBC # BLD AUTO: 7.9 K/UL (ref 4.1–11.1)

## 2023-01-06 PROCEDURE — 74011636637 HC RX REV CODE- 636/637: Performed by: CLINICAL NURSE SPECIALIST

## 2023-01-06 PROCEDURE — 80162 ASSAY OF DIGOXIN TOTAL: CPT

## 2023-01-06 PROCEDURE — 65660000001 HC RM ICU INTERMED STEPDOWN

## 2023-01-06 PROCEDURE — 74011250636 HC RX REV CODE- 250/636: Performed by: INTERNAL MEDICINE

## 2023-01-06 PROCEDURE — 74011000250 HC RX REV CODE- 250: Performed by: STUDENT IN AN ORGANIZED HEALTH CARE EDUCATION/TRAINING PROGRAM

## 2023-01-06 PROCEDURE — 84145 PROCALCITONIN (PCT): CPT

## 2023-01-06 PROCEDURE — 83605 ASSAY OF LACTIC ACID: CPT

## 2023-01-06 PROCEDURE — 97530 THERAPEUTIC ACTIVITIES: CPT

## 2023-01-06 PROCEDURE — P9047 ALBUMIN (HUMAN), 25%, 50ML: HCPCS | Performed by: INTERNAL MEDICINE

## 2023-01-06 PROCEDURE — 74011250636 HC RX REV CODE- 250/636: Performed by: CLINICAL NURSE SPECIALIST

## 2023-01-06 PROCEDURE — 74011250637 HC RX REV CODE- 250/637: Performed by: NURSE PRACTITIONER

## 2023-01-06 PROCEDURE — 97116 GAIT TRAINING THERAPY: CPT

## 2023-01-06 PROCEDURE — 82962 GLUCOSE BLOOD TEST: CPT

## 2023-01-06 PROCEDURE — 36415 COLL VENOUS BLD VENIPUNCTURE: CPT

## 2023-01-06 PROCEDURE — 74011250637 HC RX REV CODE- 250/637: Performed by: INTERNAL MEDICINE

## 2023-01-06 PROCEDURE — 99233 SBSQ HOSP IP/OBS HIGH 50: CPT | Performed by: INTERNAL MEDICINE

## 2023-01-06 PROCEDURE — 83880 ASSAY OF NATRIURETIC PEPTIDE: CPT

## 2023-01-06 PROCEDURE — 74011250637 HC RX REV CODE- 250/637: Performed by: FAMILY MEDICINE

## 2023-01-06 PROCEDURE — 80053 COMPREHEN METABOLIC PANEL: CPT

## 2023-01-06 PROCEDURE — 83735 ASSAY OF MAGNESIUM: CPT

## 2023-01-06 PROCEDURE — 74011250637 HC RX REV CODE- 250/637: Performed by: STUDENT IN AN ORGANIZED HEALTH CARE EDUCATION/TRAINING PROGRAM

## 2023-01-06 PROCEDURE — 99231 SBSQ HOSP IP/OBS SF/LOW 25: CPT | Performed by: CLINICAL NURSE SPECIALIST

## 2023-01-06 PROCEDURE — 85025 COMPLETE CBC W/AUTO DIFF WBC: CPT

## 2023-01-06 PROCEDURE — 74011250636 HC RX REV CODE- 250/636: Performed by: STUDENT IN AN ORGANIZED HEALTH CARE EDUCATION/TRAINING PROGRAM

## 2023-01-06 PROCEDURE — 84100 ASSAY OF PHOSPHORUS: CPT

## 2023-01-06 RX ORDER — DOBUTAMINE HYDROCHLORIDE 200 MG/100ML
1 INJECTION INTRAVENOUS CONTINUOUS
Status: DISCONTINUED | OUTPATIENT
Start: 2023-01-06 | End: 2023-01-11

## 2023-01-06 RX ORDER — INSULIN GLARGINE 100 [IU]/ML
8 INJECTION, SOLUTION SUBCUTANEOUS
Status: DISCONTINUED | OUTPATIENT
Start: 2023-01-06 | End: 2023-01-10

## 2023-01-06 RX ORDER — ALBUMIN HUMAN 250 G/1000ML
12.5 SOLUTION INTRAVENOUS ONCE
Status: COMPLETED | OUTPATIENT
Start: 2023-01-06 | End: 2023-01-06

## 2023-01-06 RX ADMIN — Medication 3 UNITS: at 22:25

## 2023-01-06 RX ADMIN — IPRATROPIUM BROMIDE 2 SPRAY: 21 SPRAY, METERED NASAL at 08:52

## 2023-01-06 RX ADMIN — HYDROCORTISONE SODIUM SUCCINATE 10 MG: 100 INJECTION, POWDER, FOR SOLUTION INTRAMUSCULAR; INTRAVENOUS at 08:51

## 2023-01-06 RX ADMIN — IPRATROPIUM BROMIDE 2 SPRAY: 21 SPRAY, METERED NASAL at 22:27

## 2023-01-06 RX ADMIN — CLOPIDOGREL BISULFATE 75 MG: 75 TABLET ORAL at 08:52

## 2023-01-06 RX ADMIN — DIGOXIN 0.12 MG: 125 TABLET ORAL at 08:52

## 2023-01-06 RX ADMIN — SODIUM CHLORIDE, PRESERVATIVE FREE 10 ML: 5 INJECTION INTRAVENOUS at 07:04

## 2023-01-06 RX ADMIN — ASPIRIN 81 MG: 81 TABLET, COATED ORAL at 08:52

## 2023-01-06 RX ADMIN — HEPARIN SODIUM 5000 UNITS: 5000 INJECTION INTRAVENOUS; SUBCUTANEOUS at 08:52

## 2023-01-06 RX ADMIN — HEPARIN SODIUM 5000 UNITS: 5000 INJECTION INTRAVENOUS; SUBCUTANEOUS at 22:25

## 2023-01-06 RX ADMIN — QUETIAPINE FUMARATE 50 MG: 25 TABLET ORAL at 22:25

## 2023-01-06 RX ADMIN — EMPAGLIFLOZIN 10 MG: 10 TABLET, FILM COATED ORAL at 11:37

## 2023-01-06 RX ADMIN — Medication 2 UNITS: at 17:22

## 2023-01-06 RX ADMIN — ALBUMIN (HUMAN) 12.5 G: 0.25 INJECTION, SOLUTION INTRAVENOUS at 11:37

## 2023-01-06 RX ADMIN — PANTOPRAZOLE SODIUM 40 MG: 40 TABLET, DELAYED RELEASE ORAL at 07:04

## 2023-01-06 RX ADMIN — TAMSULOSIN HYDROCHLORIDE 0.4 MG: 0.4 CAPSULE ORAL at 08:52

## 2023-01-06 RX ADMIN — Medication 6 UNITS: at 08:50

## 2023-01-06 RX ADMIN — Medication 2 UNITS: at 08:51

## 2023-01-06 RX ADMIN — INSULIN GLARGINE 8 UNITS: 100 INJECTION, SOLUTION SUBCUTANEOUS at 22:25

## 2023-01-06 RX ADMIN — MIRTAZAPINE 7.5 MG: 15 TABLET, FILM COATED ORAL at 22:25

## 2023-01-06 RX ADMIN — Medication: at 08:53

## 2023-01-06 RX ADMIN — Medication 5 UNITS: at 08:50

## 2023-01-06 NOTE — PROGRESS NOTES
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, LVEF 25-30%, stage C, NYHA class IIIA  Unable to up-titrate GDMT for HF due to hypotension and orthostasis likely due to overdiuresis; now weaning dobutamine gtt as BP, CrCl, pro-NT-BNP and lactic improves with holding diuretics  Most likely etiology of cardiomyopathy: CAD +/- TRISTAN +/- inappropriate sinus tach +/- undergoing workup (cardiac MRI with ischemic CM, PYP equivocal, gammopathy and genetics pending)  In order to determine prognosis and timing of consideration for LVAD-DT evaluation; patient will need RHC off inotropes; hopefully on Monday if possible     RECOMMENDATIONS:  RHC on Monday, is possible  Decrease dobutamine to 1 mcg/kg/min and re-dose to current weight 52.4kg from 51.5kg  Continue to hold diuretics today  Once orthosthasis resolves then will start ARB/ARNi  Once hyponatremia improves will start MRA  Start jardiance 10mg daily  Discontinue steroids  Check NICOM today and continue to wean dobutamine today or through weekend   Check orthostatics again today if improved; if not, may need a bolus of NS  Give one vial of albumin today  6 minute walk  Obtain MOCA test (screening test for LVAD/inotrope candidacy)  Inpatient sleep medicine consult pending (high risk for TRISTAN)  Continue digoxin 0.125mcg daily, check digoxin level daily (today 0.7, goal 0.7-1.2)  NOTE: insulin needs adjustments with wean of steroids/jardiance  Repeat echo off inotropes on Monday  Uric acid level 6.8  Cannot tolerate beta-blockers due to dobutamine  Cannot tolerate ACEi/ARB/ARNi/MRA due to acute renal failure  Continue baby aspirin and plavix  DNR  Physical therapy/ambulate daily     IMPRESSION:  Acute on chronic combined systolic/diastolic  heart failure  Stage D, NYHA class IV symptoms  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, LVEF 25-30% and 23% (by cMRI 1/3/23)  Undergoing evaluation for cardiac amyloidosis with cMRI  Coronary artery disease  CAD s/p CABG x 2: further disease best managed medically due to small vessel size   Peripheral arterial disease  Bilateral hydronephrosis s/p donnelly  Cardiac risk factors:  HTN  HL  TRISTAN, STOP-BANG 4  DM2  CKD, stage 3      INTERVAL EVENTS:  Ambulated, made two laps yesterday  Feels much better than on admission  Afebrile  -130s/50s, HR 70-80s  I/O neg negative 1.5 liters, urine 1.2 liters, inaccurate  WBC 7.9 from 12.3 from 13.4  Hgb 9.0 from 9.6 from 10.4  BUN 56 from 77  Cr 1.21 from 1.34 from 1.57 from 1.65  K 4.3 from 4.2  TB 0.4  Albumin 3.0  Lactic 1.3 from 1.5  NT 3483 from 3906 from 4106  Procalcitonin 0.54     LIFE GOALS:  Lifestyle goals reviewed with the patient. Patient's personal goals include: TBD  Important upcoming milestones or family events: TBD  The patient identifies the following as important for living well: TBD     Patient assessed. High suspicion of sleep apnea due to the following reasons. Will refer for sleep evaluation. [x] Heart Failure  [] Hypertention  [x] Atrial Fibrillation  [] BMI > 30  [] History of stroke  [] Diabetes  [x] Heavy snoring  [] Witnessed apnea  [] Hypoxemia                    HPI:  71 y.o. male who was admitted via ED for worsening SOB, poor appetite, orthopnea and fatigue. Pmhx of CAD s/p CABG, PAD, DM 2, recent admission for HFmrEF earlier this month. Serial TTEs show progressive decline in EF since 3/2021 with the most recent EF at 25-30%. Recent LHC shows diffuse CAD and no interventions indicated. Patient had Dinh Hanna recently and there is some thought to myocarditis or other etiology causing worsening HF.  [facility] was consulted for further evaluation and management of HFrEF. CARDIAC IMAGING:  Echo 12/26/22    Left Ventricle: Severely reduced left ventricular systolic function with a visually estimated EF of 25 - 30%. Left ventricle size is normal. Normal wall thickness. There are regional wall motion abnormalities. Grade II diastolic dysfunction with increased LAP. Right Ventricle: Moderately reduced systolic function. TAPSE is abnormal. TAPSE is 1.1 cm. Aortic Valve: Mild stenosis of the aortic valve. AV peak gradient is 13 mmHg. AV peak velocity is 1.8 m/s. Mitral Valve: Not well visualized. Moderate annular calcification at the posterior leaflet of the mitral valve. Mild to moderate regurgitation. Tricuspid Valve: Mildly elevated RVSP. Left Atrium: Left atrium is moderately dilated. 12/8/22    Left Ventricle: Moderately reduced left ventricular systolic function with a visually estimated EF of 35 - 40%. Severe hypokinesis of the following segments: mid anteroseptal, apical anterior, apical septal, apical inferior and apical lateral. Severe hypokinesis of the apex. Mitral Valve: Severely thickened leaflet, at the anterior and posterior leaflets. Severely calcified leaflet, at the anterior and posterior leaflets. Mild annular calcification of the mitral valve. Moderate regurgitation. Left Atrium: Left atrium is mildly dilated. Contrast used: Definity. limited study     EKG 12/22/22 ST, Biatria enlargement, marked ST abnormality     C 12/6/22  1. Normal LVEDP  2. Severe native multivessel coronary artery disease  3. Patent LIMA to LAD and vein graft to distal RCA  4. Recurrent ISR in OM1 stent with now 60 to 70% restenosis  5. Recoil of left main and circumflex stent with now recurrent 40 to 50% stenosis. 6.  Progression of ostial left main disease now to about 60% stenosis  7. Progression of disease in jailed first marginal branch now with diffuse 90% stenosis  8. High-grade stenosis in the mid to distal right potential femoral artery treated with 6 x 40 mm impact drug-coated balloon angioplasty to reduce the stenosis to less than 40%     NST        HEMODYNAMICS:  RHC not done  CPEST too ill   6MW too ill    PHYSICAL EXAM:  Visit Vitals  BP (!) 125/46 (BP 1 Location: Left upper arm, BP Patient Position: At rest)   Pulse 78   Temp 97.8 °F (36.6 °C)   Resp 18   Ht 5' 9\" (1.753 m)   Wt 115 lb 8.3 oz (52.4 kg)   SpO2 100%   BMI 17.06 kg/m²     General: Patient is cachectic, frail in no acute distress  HEENT: Unremarkable   Neck: Supple. No evidence of thyroid enlargements or lymphadenopathy. JVD: Cannot be appreciated   Lungs: Breath sounds are equal and clear bilaterally. No wheezes, rhonchi, or rales. Heart: Regular rate and rhythm with normal S1 and S2. No murmurs, gallops or rubs. Abdomen: Soft, no mass or tenderness. No organomegaly or hernia. Bowel sounds present. Genitourinary and rectal: deferred  Extremities: No cyanosis, clubbing, or edema. Neurologic: No focal sensory or motor deficits are noted. Grossly intact. Psychiatric: Awake, alert an doriented x 3. Appropriate mood and affect. Skin: Warm, dry and well perfused. REVIEW OF SYSTEMS:  General: Denies fever. Ear, nose and throat: Denies difficulty hearing, sinus problems, nosebleeds  Cardiovascular: see above in the interval history  Respiratory: Denies cough, wheezing, sputum production, hemoptysis.   Gastrointestinal: Denies heartburn, constipation, diarrhea, abdominal pain, nausea, blood in stool  Kidney and bladder: Denies painful urination, frequent urination  Musculoskeletal: Denies joint pain, muscle weakness  Skin and hair: Denies change in existing skin lesions    PAST MEDICAL HISTORY:  Past Medical History:   Diagnosis Date    CAD (coronary artery disease) 11/10/2016    NSTEMI & 2 stents    Deafness 10/28/2012    DM (diabetes mellitus) ([facility] [address]     Elevated cholesterol     Hypertension     NSTEMI (non-ST elevated myocardial infarction) ([facility] [place] [address] 11/10/2016       PAST SURGICAL HISTORY:  Past Surgical History:   Procedure Laterality Date    COLONOSCOPY N/A 6/28/2018    COLONOSCOPY performed by Chuy Harper MD at [facility] ENDOSCOPY    [address]  11/11/2016    2 stents       FAMILY HISTORY:  Family History   Problem Relation Age of Onset    Heart Disease Father     Heart Attack Father     Hypertension Mother     Elevated Lipids Brother     Elevated Lipids Brother     No Known Problems Sister     Elevated Lipids Brother     No Known Problems Son     No Known Problems Daughter     Anesth Problems Neg Hx        SOCIAL HISTORY:  Social History     Socioeconomic History    Marital status:    Tobacco Use    Smoking status: Never     Passive exposure: Never    Smokeless tobacco: Never   Vaping Use    Vaping Use: Never used   Substance and Sexual Activity    Alcohol use: Yes     Alcohol/week: 2.0 standard drinks     Types: 1 Cans of beer, 1 Shots of liquor per week     Comment: rarely    Drug use: No    Sexual activity: Yes     Social Determinants of Health     Financial Resource Strain: Medium Risk    Difficulty of Paying Living Expenses: Somewhat hard   Food Insecurity: Food Insecurity Present    Worried About Running Out of Food in the Last Year: Never true    Ran Out of Food in the Last Year: Often true       LABORATORY RESULTS:     Labs Latest Ref Rng & Units 1/6/2023 1/5/2023 1/4/2023 1/4/2023 1/3/2023 1/2/2023 1/1/2023   WBC 4.1 - 11.1 K/uL 7.9 12. 3(H) - 13. 4(H) 15. 3(H) 17. 3(H) 20. 8(H)   RBC 4.10 - 5.70 M/uL 3.90(L) 4.24 - 4.55 4.39 4.37 4.51   Hemoglobin 12.1 - 17.0 g/dL 9. 0(L) 9.6(L) - 10. 4(L) 10. 2(L) 10. 0(L) 10. 2(L)   Hematocrit 36.6 - 50.3 % 31. 3(L) 33. 4(L) - 35. 6(L) 33. 3(L) 33. 8(L) 34. 4(L)   MCV 80.0 - 99.0 FL 80.3 78. 8(L) - 78. 2(L) 75. 9(L) 77. 3(L) 76. 3(L)   Platelets 357 - 254 K/uL 247 297 - 319 282 305 356   Lymphocytes 12 - 49 % 11(L) 9(L) - 4(L) 5(L) 4(L) - Monocytes 5 - 13 % 8 7 - 3(L) 3(L) 3(L) -   Eosinophils 0 - 7 % 3 2 - 0 0 0 -   Basophils 0 - 1 % 1 1 - 1 1 0 -   Albumin 3.5 - 5.0 g/dL 3. 0(L) 3.4(L) - 3.6 3.7 4.0 4.3   Calcium 8.5 - 10.1 MG/DL 9.4 9.9 10. 4(H) 10. 7(H) 10. 2(H) 10. 3(H) 10. 6(H)   Glucose 65 - 100 mg/dL 177(H) 75 - 127(H) 110(H) 256(H) 172(H)   BUN 6 - 20 MG/DL 56(H) 77(H) - 96(H) 101(H) 105(H) 106(H)   Creatinine 0.70 - 1.30 MG/DL 1.21 1.34(H) - 1.57(H) 1.65(H) 1.93(H) 2.01(H)   Sodium 136 - 145 mmol/L 133(L) 133(L) - 135(L) 134(L) 137 138   Potassium 3.5 - 5.1 mmol/L 4.3 4.2 - 4.4 4.4 4.6 4.3   TSH 0.36 - 3.74 uIU/mL - - - - - - -   PSA 0.01 - 4.0 ng/mL - - - - - - -   Some recent data might be hidden     Lab Results   Component Value Date/Time    TSH 2.12 12/27/2022 02:36 PM    TSH 4.80 (H) 12/06/2022 03:53 AM    TSH 5.39 (H) 10/12/2022 09:10 AM    TSH 3.53 02/03/2022 11:47 AM    TSH 5.790 (H) 11/21/2019 04:45 PM    TSH 3.08 06/22/2018 01:53 PM    TSH 4.250 05/26/2015 09:43 AM       ALLERGY:  No Known Allergies     CURRENT MEDICATIONS:    Current Facility-Administered Medications:     albuterol-ipratropium (DUO-NEB) 2.5 MG-0.5 MG/3 ML, 3 mL, Nebulization, Q6H PRN, Buddy Henley MD    DOBUTamine (DOBUTREX) 500 mg/250 mL (2,000 mcg/mL) infusion, 2 mcg/kg/min, IntraVENous, CONTINUOUS, Jerry Dixon MD, Last Rate: 3.1 mL/hr at 01/05/23 1157, 2 mcg/kg/min at 01/05/23 1157    insulin NPH (NOVOLIN N, HUMULIN N) injection 6 Units, 6 Units, SubCUTAneous, Q12H, 6 Units at 01/06/23 0850 **AND** hydrocortisone Sod Succ (PF) (SOLU-CORTEF) injection 10 mg, 10 mg, IntraVENous, Q12H, Cathy Sheldon CNS, 10 mg at 01/06/23 0851    insulin lispro (HUMALOG) injection, , SubCUTAneous, AC&HS, Cathy Sheldon CNS, 2 Units at 01/06/23 0851    dextrose 10 % infusion 0-250 mL, 0-250 mL, IntraVENous, PRN, Cathy Sheldon CNS    insulin lispro (HUMALOG) injection 5 Units, 5 Units, SubCUTAneous, TID WITH MEALS, Cathy Sheldon CNS, 5 Units at 01/06/23 0850    dextrose 10 % infusion 0-250 mL, 0-250 mL, IntraVENous, PRN, Cathy Sheldon, CNS    digoxin (LANOXIN) tablet 0.125 mg, 0.125 mg, Oral, DAILY, Colleen Astorga, NP, 0.125 mg at 01/06/23 0852    mirtazapine (REMERON) tablet 7.5 mg, 7.5 mg, Oral, QHS, Aviva Jackson MD, 7.5 mg at 01/05/23 2234    clopidogreL (PLAVIX) tablet 75 mg, 75 mg, Oral, DAILY, Erica Mccormick, NP, 75 mg at 01/06/23 0852    [Held by provider] bumetanide (BUMEX) injection 2 mg, 2 mg, IntraVENous, Q12H, Desire Dodd MD, 2 mg at 01/03/23 0837    [Held by provider] potassium chloride SR (KLOR-CON 10) tablet 40 mEq, 40 mEq, Oral, BID, Ralf MOLINA MD, 40 mEq at 01/03/23 0838    pantoprazole (PROTONIX) tablet 40 mg, 40 mg, Oral, ACB, Christine Cammie, DO, 40 mg at 01/06/23 0704    heparin (porcine) injection 5,000 Units, 5,000 Units, SubCUTAneous, Q12H, Christine Cammie, DO, 5,000 Units at 01/06/23 7787    QUEtiapine (SEROquel) tablet 50 mg, 50 mg, Oral, QHS, David Aguilar G, DO, 50 mg at 01/05/23 2234    lidocaine 4 % patch 1 Patch, 1 Patch, TransDERmal, Q24H, Eloy Landau, MD, 1 Patch at 01/05/23 1556    balsam peru-castor oiL (VENELEX) ointment, , Topical, BID, Jose Agudelo MD, Given at 01/06/23 0853    alteplase (CATHFLO) 1 mg in sterile water (preservative free) 1 mL injection, 1 mg, InterCATHeter, PRN, Ralf MOLINA MD    bacitracin 500 unit/gram packet 1 Packet, 1 Packet, Topical, PRN, Ralf MOLINA MD    glucose chewable tablet 16 g, 4 Tablet, Oral, PRN, Ralf MOLINA MD    glucagon (GLUCAGEN) injection 1 mg, 1 mg, IntraMUSCular, PRN, Eloy Landau, MD    dextrose 10 % infusion 0-250 mL, 0-250 mL, IntraVENous, PRN, Eloy Landau, MD    senna-docusate (PERICOLACE) 8.6-50 mg per tablet 1 Tablet, 1 Tablet, Oral, BID PRN, Eloy Landau, MD, 1 Tablet at 12/26/22 8067    bisacodyL (DULCOLAX) suppository 10 mg, 10 mg, Rectal, QHS PRN, Eloy Landau, MD sodium chloride (NS) flush 5-40 mL, 5-40 mL, IntraVENous, Q8H, Walker Aponte MD, 10 mL at 01/06/23 0704    sodium chloride (NS) flush 5-40 mL, 5-40 mL, IntraVENous, PRN, Sarah MOLINA MD, 10 mL at 12/28/22 0845    acetaminophen (TYLENOL) tablet 650 mg, 650 mg, Oral, Q6H PRN, 650 mg at 01/05/23 2234 **OR** acetaminophen (TYLENOL) suppository 650 mg, 650 mg, Rectal, Q6H PRN, Walker Whitten MD    polyethylene glycol (MIRALAX) packet 17 g, 17 g, Oral, DAILY PRN, Jordi Mederos MD, 17 g at 12/26/22 0649    ondansetron (ZOFRAN ODT) tablet 4 mg, 4 mg, Oral, Q8H PRN **OR** ondansetron (ZOFRAN) injection 4 mg, 4 mg, IntraVENous, Q6H PRN, Sarah MOLINA MD, 4 mg at 12/24/22 0849    aspirin delayed-release tablet 81 mg, 81 mg, Oral, DAILY, Maty Hutson MD, 81 mg at 01/06/23 0852    ipratropium (ATROVENT) 21 mcg (0.03 %) nasal spray 2 Spray, 2 Spray, Both Nostrils, Q12H, Ranjit Pearson MD, 2 [place] at 01/06/23 0852    tamsulosin (FLOMAX) capsule 0.4 mg, 0.4 mg, Oral, DAILY, [place] Pearson MD, 0.4 mg at 01/06/23 9368    sodium chloride (NS) flush 5-40 mL, 5-40 mL, IntraVENous, PRN, Jordi Mederos MD    PATIENT CARE TEAM:  Patient Care Team:  Amando Ross MD as PCP - General (Family Medicine)  Amando Ross MD as PCP - [facility]  Katelynn Callaway MD (Cardiovascular Disease Physician)  Hermes Carreon MD (Gastroenterology)  General Pily MD (Cardiothoracic Surgery)  Olu Parker MD (Cardiovascular Disease Physician)  Lucita Kelly MD (Nephrology)  Jonah Lund RN as Care Transitions Nurse  Meredith Briceño MD (Pulmonary Disease)     Thank you for allowing me to participate in this patient's care.     Elayne Graves MD   [address] [place] [address], [address]  Phone: [phone]

## 2023-01-06 NOTE — PROGRESS NOTES
NICOM Hemodynamic Monitoring     Visit Vitals  BP (!) 122/47 (BP 1 Location: Left upper arm, BP Patient Position: Semi fowlers)   Pulse 81   Temp 98.8 °F (37.1 °C)   Resp 26   Ht 5' 9\" (1.753 m)   Wt 51.5 kg (113 lb 8.6 oz)   SpO2 100%   BMI 16.77 kg/m²         Baseline assessment:        CO 2.3         CI 1.4        SVI 16         SVV 21        TPR 2606

## 2023-01-06 NOTE — DIABETES MGMT
[facility]  DIABETES MANAGEMENT CONSULT    Consulted by  Dre Salguero MD   for advanced nursing evaluation and care for inpatient blood glucose management. Evaluation and Action Plan   Hanna Ingram is a 71year old gentleman, with Type 2 Diabetes with a recent A1C of 6.5%, who was admitted with acute on chronic HFrEF and cardiogenic shock. He was admitted 2 weeks ago for similar complaint and antihyperglycemic agents were adjusted on discharge. Metformin was stopped due to decline in GFR and Jardiance switched to Sr Obey (unclear why). He was compliant with his Lawerence Cyphers, Sr Obey and Glipizide up until day prior to this hospitalization. His glucose was significantly elevated at 458 on admission, s/t insulin resistance from acute HFrEF, elevated lactate and impaired perfusion. Low dose basal insulin was started and sufficient to control BG. There was a suspicion for myocarditis and hydrocortisone 50mg twice daily was started. Basal insulin was escalated to 48 units but with an exaggerated post-prandial glucose spike to over 400 daily- related to steroids in the post-prandial state and high carb containing nutritional shakes. Steroids now weaning to off over the next several days. Given changes in steroid dosing over the next few days, he was switched to NPH basal as this mirrors the same duration as hydrocortisone. Inpatient BG goal is 140-180mg/dl. Addendum 1/6/23 1137: Jardiance 10mg daily starting, steroids complete. Will adjust back to baseline basal without steroids- 8 units glargine daily. May not require bolus humalog with jardiance on board.       Management Rationale Action Plan   Medication   Basal needs Diabetes: 8 units daily(0.15units/kg)   Hydrocortisone D/C: Can switch back to 8 units Lantus HS   Nutritional needs Using low sensitivity based on low   BMI 5 units Humalog/meal  If Pre-prandial BG remains elevated (despite steroid reduction/nutritional adjustment/Jardiance) will increase dose by 2 units. Otherwise if pre-meal BG under goal with above changes, can stop. Hold if patient is NPO or consumes less than 50% of carbohydrates on meal tray     Corrective insulin Using Normal Sensitivity based on steroid wean Normal Sensitivity ACHS   Additional Recommendations. POC glucose ACHS    Consistent carbohydrate diet (60 grams CHO/meal). Appreciate RD help with adjustments. Initial Presentation   Jem Dillard is a 71 y.o. male who presented to the ED 12/22/22 with a 3-4 day c/o gradually worsening dyspnea, fatigue, chills, constipation and RLQ pain. He endorses difficulty voiding despite compliance with lasix. In the ED, he was hypoxic and started on supplemental O2 and diuresis. LAB: WBC 25.1, , GFR 25, Lac 5.2, Trop 1096, BNP 6905  CXR: Widespread interstitial opacities bilateral mid to lower lung opacities  concerning for edema and/or atypical infection. Small bilateral pleural  effusions. CT: CT revealed distended bladder  EKG: Sinus tachycardia   Biatrial enlargement   Rightward axis   Marked ST abnormality, possible inferior subendocardial injury   Marked ST abnormality, possible lateral subendocardial injury   Abnormal ECG   Did have a hospital admission for HFrEF 12/11/22-12/16/22    HX:   Past Medical History:   Diagnosis Date    CAD (coronary artery disease) 11/10/2016    NSTEMI & 2 stents    Deafness 10/28/2012    DM (diabetes mellitus) ([facility]     Elevated cholesterol     Hypertension     NSTEMI (non-ST elevated myocardial infarction) ([facility] 11/10/2016    CKD  CAD with CABG 2018    INITIAL DX:   Sepsis ([facility] [address] [A41.9]     Current Treatment     TX: IVF, Diuresis, Supplemental O2    Hospital Course   Clinical progress has been complicated by:   98/54: Initial admission with hospitalist service but with progressive hypoxia early requiring CCU transfer. Elevated troponin/BNP. BPH and hydronephrosis.  Pt pan cultured, given dose of abx and dose of IVP lasix. Pt placed on BiPAP w/ improvement in oxygenation. 12/23: Seen by urology for bilateral hydronephrosis related to distended bladder. Maintain urinary catheter. 12/24: CVL placement   12/26: Decreased urine output, started on bumex and dobutamine gtt. Cardiology consult. Continue inotrope. 12/27: Advanced Heart Failure Consult. Started HF evaluation. Nephrology consult: Decreased bumex gtt  12/28: Mini pulse steroids per cardiology for treatment of myocarditis. Insulin gtt. 12/30: Transferred to hospitalist service. 1/3: Cardiac MRI: Ischemic CM  Diabetes History   Type 2 Diabetes  Ambulatory BG management provided by: PCP Arthurine Kocher, MD    Diabetes-related Medical History  Acute complications  Acute hyperglycemia  Neurological complications  Peripheral neuropathy  Microvascular disease  Nephropathy  Macrovascular disease  CAD  Other associated conditions     CHF    Diabetes Medication History  Key Antihyperglycemic Medications               dapagliflozin (FARXIGA) 10 mg tab tablet (Taking) Take 1 Tablet by mouth daily.     glipiZIDE (GLUCOTROL) 5 mg tablet (Taking) Take 1 tablet by mouth twice daily    Januvia 50 mg tablet (Taking) Take 1 tablet by mouth once daily             Diabetes self-management practices:   Eating pattern   Eats 3 small meals daily  [x] Breakfast  2 Eggs, Coffee  [x] Lunch   [place]  [x] Dinner   \"[place] Food\" Bread, rice, lentils   [x] Bedtime  [place]  [x] Snacks   Afternoon snack  [x] Beverages  Water, Coffee  Physical activity pattern   Sedentary   Monitoring pattern  Does not check BG  Taking medications pattern  [x] Consistent administration  [x] Affordable  Social determinants of health impacting diabetes self-management practices   Concerned that you need to know more about how to stay healthy with diabetes  Overall evaluation:    [x] Achieving A1c target with drug therapy & self-care practices    Subjective   I took my medicine yesterday.      Objective   Physical exam  General Underweight male in mild distress/ill-appearing. Conversant and cooperative  Neuro  Alert, oriented   Vital Signs Visit Vitals  BP (!) 125/46 (BP 1 Location: Left upper arm, BP Patient Position: At rest)   Pulse 78   Temp 97.8 °F (36.6 °C)   Resp 18   Ht 5' 9\" (1.753 m)   Wt 52.4 kg (115 lb 8.3 oz)   SpO2 100%   BMI 17.06 kg/m²     Skin  Warm and dry. Acanthosis noted along neckline. No lipohypertrophy or lipoatrophy noted at injection sites   Heart   Regular rate and rhythm. 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 Blood glucose pattern    Significant diabetes-related events over the past 24-72 hours  A1C  6.5% 22 (down from 7.0 10/12/22)  Fasting B  Pre-prandial: 122-400  Basal: Weaned to NPH:6 units NPH Q12  Bolus: 5 units Humalog/meal. 8 units given at dinner last night  Correction: 12 units in the last 24h  Hydrocortisone weaned to 10mg this Q12-  and taper to off over the next few days per [place]  Plan for [facility] test (screening test for LVAD/inotrope candidacy)  Breakfast: Fruit cup/[place] Toast/Milk/[place]: 40g CHO.   He said he may drink his Glucerna later this am.    Assessment and Nursing Intervention   Nursing Diagnosis Risk for unstable blood glucose pattern   Nursing Intervention Domain 5250 Decision-making Support   Nursing Interventions Examined current inpatient diabetes/blood glucose control   Explored factors facilitating and impeding inpatient management  Explored corrective strategies with patient and responsible inpatient provider   Informed patient of rational for insulin strategy while hospitalized     Nursing Diagnosis 63114 Ineffective Health Management   Nursing Intervention Domain 5250 Decision-making Support   Nursing Interventions Identified diabetes self-management practices impeding diabetes control  Discussed diabetes survival skills related to  Healthy Plate eating plan; given handouts  Role of physical activity in improving insulin sensitivity and action  Procedure for blood glucose monitoring & options for low-cost products  Medications plan at discharge     Billing Code(s)   [x] [phone]    Before making these care recommendations, I personally reviewed the hospitalization record, including notes, laboratory & diagnostic data and current medications, and examined the patient at the bedside (circumstances permitting) before making care recommendations. More than fifty (50) percent of the time was spent in patient counseling and/or care coordination.   Total minutes: 25    SLICK Jorge  Diabetes Clinical Nurse Specialist  [facility]  Access via [employer]

## 2023-01-06 NOTE — PROGRESS NOTES
NAME: Anna Webber        :  1950        MRN:  [id number]         Assessment:    TANNER on CKD stage 3a to 3b, in the setting of urinary retention, poor hemodynamics and low EF at risk for CRS  Urinary retention/hydro s/p donnelly  A on chronic biventricular CHF; EF 20-25%, 23% by cMRI  Anemia  Borderline hypotension  Hypercalcemia started on high dose po vit D on ; off now. High Mg-resolved  Hypophosphatemia-mild     TANNER resolved. CR actually down to normal today, but expected to increase again once we resume diuretics back later at some point. Hypermagnesemia and hypercalcemia have resolved    SPEP is negative for M spike. Serum FLC ratio is mildly elevated at 2.1, which is likely due to CKD    Plan:  Continue to hold IV diuretics  Monitor weight and edema. Will resume diuretics orally, if he starts to develop symptoms of CHF   continue IV dobutamine per cardiology/CHF team.  The dose has been slowly down titrated  continue OFF vit D; encourage increase mobility as possible  Being worked up and considered for possible LVAD  Donnelly reinserted and to be left in for now  Continue Flomax  Will need outpatient urology follow-up for voiding trial    Discussed with patient and Dr. Ruperto Bledsoe    Will sign off. Please call back with questions or concerns    Subjective:   Out of bed in chair. Feels little better. Did walk a little yesterday. Denies feeling dizzy or lightheaded  No nausea or vomiting  No worsening shortness of breath    Objective:     VITALS:   Last 24hrs VS reviewed since prior progress note.  Most recent are:  Visit Vitals  BP (!) 125/46 (BP 1 Location: Left upper arm, BP Patient Position: At rest)   Pulse 78   Temp 97.8 °F (36.6 °C)   Resp 18   Ht 5' 9\" (1.753 m)   Wt 52.4 kg (115 lb 8.3 oz)   SpO2 100%   BMI 17.06 kg/m²       Intake/Output Summary (Last 24 hours) at 2023 1015  Last data filed at 2023 0424  Gross per 24 hour   Intake 832.83 ml   Output 2000 ml   Net -1167.17 ml        Telemetry Reviewed:     PHYSICAL EXAM:  General: Elderly, frail, NAD  No significant edema  AOx3  Vasquez +      Lab Data Reviewed: (see below)    Medications Reviewed: (see below)    PMH/SH reviewed - no change compared to H&P  ________________________________________________________________________  ________________________________________________________________________  Oneta MD Gabino     Procedures: see electronic medical records for all procedures/Xrays and details which  were not copied into this note but were reviewed prior to creation of Plan. LABS:  Recent Labs     01/06/23 0435 01/05/23  0022   WBC 7.9 12.3*   HGB 9.0* 9.6*   HCT 31.3* 33.4*    297       Recent Labs     01/06/23 0435 01/05/23 0023 01/05/23  0022 01/04/23  0231   *  --  133* 135*   K 4.3  --  4.2 4.4     --  98 102   CO2 25  --  27 26   BUN 56*  --  77* 96*   CREA 1.21  --  1.34* 1.57*   *  --  75 127*   CA 9.4  --  9.9 10.4*  10.7*   MG 2.3 2.5*  --  2.8*   PHOS 2.3* 3.4  --  3.8       Recent Labs     01/06/23 0435 01/05/23  0022 01/04/23  0231   * 178* 184*   TP 6.5 6.8 7.8   ALB 3.0* 3.4* 3.6   GLOB 3.5 3.4 4.2*       No results for input(s): INR, PTP, APTT, INREXT, INREXT in the last 72 hours.    Recent Labs     01/04/23  0231   TIBC 277   PSAT 29   FERR 463*        Lab Results   Component Value Date/Time    Folate 10.5 07/03/2018 09:24 AM         MEDICATIONS:  Current Facility-Administered Medications   Medication Dose Route Frequency    albuterol-ipratropium (DUO-NEB) 2.5 MG-0.5 MG/3 ML  3 mL Nebulization Q6H PRN    DOBUTamine (DOBUTREX) 500 mg/250 mL (2,000 mcg/mL) infusion  2 mcg/kg/min IntraVENous CONTINUOUS    insulin NPH (NOVOLIN N, HUMULIN N) injection 6 Units  6 Units SubCUTAneous Q12H    And    hydrocortisone Sod Succ (PF) (SOLU-CORTEF) injection 10 mg  10 mg IntraVENous Q12H    insulin lispro (HUMALOG) injection   SubCUTAneous AC&HS    dextrose 10 % infusion 0-250 mL  0-250 mL IntraVENous PRN    insulin lispro (HUMALOG) injection 5 Units  5 Units SubCUTAneous TID WITH MEALS    dextrose 10 % infusion 0-250 mL  0-250 mL IntraVENous PRN    digoxin (LANOXIN) tablet 0.125 mg  0.125 mg Oral DAILY    mirtazapine (REMERON) tablet 7.5 mg  7.5 mg Oral QHS    clopidogreL (PLAVIX) tablet 75 mg  75 mg Oral DAILY    [Held by provider] bumetanide (BUMEX) injection 2 mg  2 mg IntraVENous Q12H    [Held by provider] potassium chloride SR (KLOR-CON 10) tablet 40 mEq  40 mEq Oral BID    pantoprazole (PROTONIX) tablet 40 mg  40 mg Oral ACB    heparin (porcine) injection 5,000 Units  5,000 Units SubCUTAneous Q12H    QUEtiapine (SEROquel) tablet 50 mg  50 mg Oral QHS    lidocaine 4 % patch 1 Patch  1 Patch TransDERmal Q24H    balsam peru-castor oiL (VENELEX) ointment   Topical BID    alteplase (CATHFLO) 1 mg in sterile water (preservative free) 1 mL injection  1 mg InterCATHeter PRN    bacitracin 500 unit/gram packet 1 Packet  1 Packet Topical PRN    glucose chewable tablet 16 g  4 Tablet Oral PRN    glucagon (GLUCAGEN) injection 1 mg  1 mg IntraMUSCular PRN    dextrose 10 % infusion 0-250 mL  0-250 mL IntraVENous PRN    senna-docusate (PERICOLACE) 8.6-50 mg per tablet 1 Tablet  1 Tablet Oral BID PRN    bisacodyL (DULCOLAX) suppository 10 mg  10 mg Rectal QHS PRN    sodium chloride (NS) flush 5-40 mL  5-40 mL IntraVENous Q8H    sodium chloride (NS) flush 5-40 mL  5-40 mL IntraVENous PRN    acetaminophen (TYLENOL) tablet 650 mg  650 mg Oral Q6H PRN    Or    acetaminophen (TYLENOL) suppository 650 mg  650 mg Rectal Q6H PRN    polyethylene glycol (MIRALAX) packet 17 g  17 g Oral DAILY PRN    ondansetron (ZOFRAN ODT) tablet 4 mg  4 mg Oral Q8H PRN    Or    ondansetron (ZOFRAN) injection 4 mg  4 mg IntraVENous Q6H PRN    aspirin delayed-release tablet 81 mg  81 mg Oral DAILY    ipratropium (ATROVENT) 21 mcg (0.03 %) nasal spray 2 Spray  2 Spray Both Nostrils Q12H    tamsulosin (FLOMAX) capsule 0.4 mg  0.4 mg Oral DAILY    sodium chloride (NS) flush 5-40 mL  5-40 mL IntraVENous PRN

## 2023-01-06 NOTE — PROGRESS NOTES
Problem: Mobility Impaired (Adult and Pediatric)  Goal: *Therapy Goal (Edit Goal, Insert Text)  Description: FUNCTIONAL STATUS PRIOR TO ADMISSION: Patient was independent and active without use of DME. Patient is currently driving. Patient is independent with ADLs and IADLs. HOME SUPPORT PRIOR TO ADMISSION: The patient lived with his wife, but did not require assist.    Physical Therapy Goals  Continued 1/4/2023  1. Patient will move from supine to sit and sit to supine, scoot up and down, and roll side to side in bed with modified independence within 7 day(s). 2.  Patient will transfer from bed to chair and chair to bed with modified independence using the least restrictive device within 7 day(s). 3.  Patient will perform sit to stand with modified independence within 7 day(s). 4.  Patient will ambulate with modified independence for 150 feet with the least restrictive device within 7 day(s). 5.  Patient will ascend/descend 13 stairs with single handrail(s) with modified independence within 7 day(s). Initiated 12/24/2022  1. Patient will move from supine to sit and sit to supine, scoot up and down, and roll side to side in bed with modified independence within 7 day(s). 2.  Patient will transfer from bed to chair and chair to bed with modified independence using the least restrictive device within 7 day(s). 3.  Patient will perform sit to stand with modified independence within 7 day(s). 4.  Patient will ambulate with modified independence for 150 feet with the least restrictive device within 7 day(s). 5.  Patient will ascend/descend 13 stairs with single handrail(s) with modified independence within 7 day(s).     Outcome: Progressing Towards Goal     PHYSICAL THERAPY TREATMENT  Patient: Leandra Carver (76 y.o. male)  Date: 1/6/2023  Diagnosis: Sepsis ([facility] [A41.9] <principal problem not specified>      Precautions: Fall  Chart, physical therapy assessment, plan of care and goals were reviewed. ASSESSMENT  Patient continues with skilled PT services and is progressing towards goals. Patient received in bed and agreeable to treatment, cleared by RN to see. Patient MAP remained slightly lower when sitting and standing however patient is asymptomatic. Patient able to tolerate gait training today with improved gait pattern and speed. Patient shows no s/s of distress during session. Attempt for 6MWT, able to make it 4 min and 25 seconds before seated rest to monitor vitals. Patient with increased confidence today and reduced overall need for assistance. MAP improved post gait training. Patient's children are present at end of session and expressing need to speak to MD and CM regarding discharge planning. Alerted RN and CM. Patient will likely need rollator vs RW at discharge if he is to discharge home. Have not trialed rollator yet but family expresses interest. Will trial at next session as able. Will continue to follow in acute setting. 01/06/23 1050 01/06/23 1056 01/06/23 1058   Vital Signs   Pulse (Heart Rate) 83 89 92   BP (!) 131/44 (!) 100/38 (!) 96/39   MAP (Calculated) 73 (!) 59 (!) 58   BP 1 Location Left upper arm Left upper arm Left upper arm   BP 1 Method Automatic Automatic Automatic   BP Patient Position Semi fowlers Sitting Standing      01/06/23 1109   Vital Signs   Pulse (Heart Rate) 98   BP (!) 125/32   MAP (Calculated) (!) 63   BP 1 Location Left upper arm   BP 1 Method Automatic   BP Patient Position Sitting; Other (Comment)  (after gait training)       Current Level of Function Impacting Discharge (mobility/balance): supervision for bed mobility, SBA for transfers, SBA/CGA for gait training wi RW up to 120ft, constant support when standing    Other factors to consider for discharge: high PLOF, strong family support         PLAN :  Patient continues to benefit from skilled intervention to address the above impairments. Continue treatment per established plan of care.   to address goals. Recommendation for discharge: (in order for the patient to meet his/her long term goals)  Therapy 3 hours per day 5-7 days per week if not then home [facility] 24[facility]     This discharge recommendation:  Has been made in collaboration with the attending provider and/or case management    IF patient discharges home will need the following DME: rolling walker vs rollator pending progress       SUBJECTIVE:   Patient stated I feel good!     OBJECTIVE DATA SUMMARY:   Critical Behavior:  Neurologic State: Alert  Orientation Level: Oriented X4  Cognition: Follows commands  Safety/Judgement: Awareness of environment, Fall prevention, Insight into deficits  Functional Mobility Training:  Bed Mobility:  Rolling: Supervision  Supine to Sit: Supervision  Sit to Supine:  (left in chair)  Scooting: Supervision        Transfers:  Sit to Stand: Stand-by assistance  Stand to Sit: Stand-by assistance  Stand Pivot Transfers: Stand-by assistance       Balance:  Sitting: Intact; Without support  Standing: Impaired; Without support  Standing - Static: Constant support;Good  Standing - Dynamic : Constant support; Fair  Ambulation/Gait Training:  Distance (ft): 120 Feet (ft)  Assistive Device: Walker, rolling;Gait belt  Ambulation - Level of Assistance: Stand-by assistance;Contact guard assistance        Gait Abnormalities: Decreased step clearance        Base of Support: Widened     Speed/Bette: Pace decreased (<100 feet/min)  Step Length: Right shortened;Left shortened       Activity Tolerance:   Good and requires rest breaks    After treatment patient left in no apparent distress:   Sitting in chair and Call bell within reach    COMMUNICATION/COLLABORATION:   The patients plan of care was discussed with: Occupational therapist, Registered nurse, and Case management.      Jaimie Houston, PT   Time Calculation: 38 mins

## 2023-01-06 NOTE — PROGRESS NOTES
[address] [facility]. [place], [address]   Tel.  [phone]   Fax. [address] [facility]   [place], [address] [facility]   Tel.  [phone]   Fax. [phone] [address] [address]   Tel.  [phone]   Fax. [phone]       In-patient consult for sleep evaluation received. We will contact patient post discharge to schedule consult. Thank you for the opportunity to participate in the care of Peterson Buck and please do not hesitate to contact us should you have any questions.

## 2023-01-06 NOTE — PROGRESS NOTES
[address] [facility] Adult  Hospitalist Group                                                                                          Hospitalist Progress Note  Domenica Denis MD  Answering service: [phone] from in house phone        Date of Service:  2023  NAME:  Emiliano Valadez  :  1950  MRN:  [id number]      Admission Summary:   Emiliano Valadez is a 71 y.o. male who presents with progressively worsening shortness of breath for past several days. It has gotten worse to the point that he can only ambulate a few steps due to severe dyspnea and near syncope. Patient also noted abdominal distention which is increasing. Patient with known history of cardiomyopathy and recently admitted for CHF exacerbation a week ago. On presentation to the ER, chest x-ray shows diffuse airspace disease atypical infection versus edema. Also patient was noted to have elevated troponin and BNP. Patient was further evaluated by CT abdomen and pelvis which shows massively distended bladder with some bilateral hydronephrosis, subsequently Vasquez was placed. Patient also with significant leukocytosis as well as tachycardic and elevated lactic acid level and subsequently code sepsis was called. Hospitalist service requested admit the patient for further evaluation management.        Interval history / Subjective:     Patient seen by advanced heart failure team Dr. Aurelio Wilkes reviewed recommendation RHC planned for Monday  Being worked up and considered for possible LVAD  Nephrology signed off cr stable   Dobutamine also being titrated off      Assessment & Plan:      Acute on chronic systolic heart failure exacerbation-EF 25%  Concern for myocarditis  Cardiogenic shock-ongoing on dobutamine drip  Lactic acidosis  Coronary direct artery disease CAD status post CABG in 2018, PCI  -Cardiology and advanced heart failure following, per their notation suggestion of alternative mechanism to ischemia.   -Continue IV dobutamine  , probably will be long term for home  currently trying to wean off per AHFT   -Bumex on hold today nephro signed off   - Unable to tolerate GDMT due to hypotension  -Viral and immune panel ordered through advanced heart failure services  - steroid taperd out for myocarditis  - s/p cardiac MRI, no sign of infiltrative process. Per Dr. Carla Gage likely no benefit for PCI      Acute kidney injury on CKD stage III-improving  -Holding Bumex IV  -Nephrology signed off   -I's and O's daily weight      Type 2 diabetes -A1c 6.5%  -Continue NPH 6 units BID sliding scale insulin, and increase to 5 U with meals   -POCT glucose checks and hypoglycemia protocol   -Will improve with hydrocortisone wean   -Consult DTC     Leukocytosis - BXS06.2   - No localizing sign of infection   - UA negative, CXR with interstitial changes, no new infiltrate  - Pro calcitonin reassuring  - ?secondary to steroids.   - Monitor daily     Insomnia, hospital delirium-continue Seroquel at bedtime, DC benadryl given age, and possible worsening urinary retention. Continue mirtazapine   Hypokalemia-continue potassium supplementation, check daily  BPH-continue tamsulosin, failed void trial 12/31, consider repeating in a few days when off benadryl   PAD, status post right SFA PTCA, follow-up with Dr. Carla Gage in 2 to 3 months        Code status: DNR  Prophylaxis: heparin   Care Plan discussed with: pt, RN   Anticipated Disposition: greater than 48 hours, needs to be weaned off dobutamine vs. Long term dobutamine.  RHC Monday  Being worked up and considered for possible LVAD     Hospital Problems  Date Reviewed: 12/5/2022            Codes Class Noted POA    Systolic CHF, acute on chronic [facility]) ICD-10-CM: I50.23  ICD-9-CM: 428.23, 428.0  12/29/2022 Yes        Receiving inotropic medication ICD-10-CM: Z79.899  ICD-9-CM: V49.89  12/29/2022 Unknown        Sepsis ([facility] [address] ICD-10-CM: A41.9  ICD-9-CM: 038.9, 995.91  12/22/2022 Unknown       Review of Systems:   A comprehensive review of systems was negative except for that written in the HPI. Vital Signs:    Last 24hrs VS reviewed since prior progress note. Most recent are:  Visit Vitals  BP (!) 125/46 (BP 1 Location: Left upper arm, BP Patient Position: At rest)   Pulse 83   Temp 97.8 °F (36.6 °C)   Resp 18   Ht 5' 9\" (1.753 m)   Wt 52.4 kg (115 lb 8.3 oz)   SpO2 100%   BMI 17.06 kg/m²         Intake/Output Summary (Last 24 hours) at 1/6/2023 1100  Last data filed at 1/6/2023 8572  Gross per 24 hour   Intake 1082.83 ml   Output 2000 ml   Net -917.17 ml          Physical Examination:     I had a face to face encounter with this patient and independently examined them on 1/6/2023 as outlined below:          Constitutional:  No acute distress, cooperative, pleasant, frail elderly    ENT:  Oral mucosa dry  oropharynx benign. Resp:  CTA bilaterally. No wheezing/rhonchi/rales. No accessory muscle use. CV:  Regular rhythm, normal rate, no murmurs, gallops, rubs    GI:  Soft, non distended, non tender. normoactive bowel sounds, no hepatosplenomegaly     Musculoskeletal:  No edema, warm, 2+ pulses throughout    Neurologic:  Moves all extremities.   Awake and alert, CN II-XII reviewed            Data Review:    Review and/or order of clinical lab test  Review and/or order of tests in the radiology section of CPT  Review and/or order of tests in the medicine section of CPT      Labs:     Recent Labs     01/06/23 0435 01/05/23 0022   WBC 7.9 12.3*   HGB 9.0* 9.6*   HCT 31.3* 33.4*    297       Recent Labs     01/06/23  0435 01/05/23  0023 01/05/23  0022 01/04/23  0231   *  --  133* 135*   K 4.3  --  4.2 4.4     --  98 102   CO2 25  --  27 26   BUN 56*  --  77* 96*   CREA 1.21  --  1.34* 1.57*   *  --  75 127*   CA 9.4  --  9.9 10.4*  10.7*   MG 2.3 2.5*  --  2.8*   PHOS 2.3* 3.4  --  3.8       Recent Labs     01/06/23  0435 01/05/23  0022 01/04/23  0231   * 104* 122*   * 178* 184*   TBILI 0.4 0.5 0.6   TP 6.5 6.8 7.8   ALB 3.0* 3.4* 3.6   GLOB 3.5 3.4 4.2*       No results for input(s): INR, PTP, APTT, INREXT, INREXT in the last 72 hours. Recent Labs     01/04/23  0231   TIBC 277   PSAT 29   FERR 463*        Lab Results   Component Value Date/Time    Folate 10.5 07/03/2018 09:24 AM        No results for input(s): PH, PCO2, PO2 in the last 72 hours.   Recent Labs     01/04/23  0231   CPK 87       Lab Results   Component Value Date/Time    Cholesterol, total 143 10/12/2022 09:10 AM    HDL Cholesterol 49 10/12/2022 09:10 AM    LDL, calculated 41.4 10/12/2022 09:10 AM    Triglyceride 263 (H) 10/12/2022 09:10 AM    CHOL/HDL Ratio 2.9 10/12/2022 09:10 AM     Lab Results   Component Value Date/Time    Glucose (POC) 122 (H) 01/05/2023 09:12 PM    Glucose (POC) 400 (H) 01/05/2023 04:49 PM    Glucose (POC) 371 (H) 01/05/2023 11:46 AM    Glucose (POC) 122 (H) 01/05/2023 06:55 AM    Glucose (POC) 146 (H) 01/04/2023 09:05 PM     Lab Results   Component Value Date/Time    Color YELLOW/STRAW 01/01/2023 09:13 AM    Appearance CLEAR 01/01/2023 09:13 AM    Specific gravity 1.013 01/01/2023 09:13 AM    Specific gravity 1.020 05/03/2014 08:18 AM    pH (UA) 5.5 01/01/2023 09:13 AM    Protein 30 (A) 01/01/2023 09:13 AM    Glucose Negative 01/01/2023 09:13 AM    Ketone Negative 01/01/2023 09:13 AM    Bilirubin Negative 01/01/2023 09:13 AM    Urobilinogen 1.0 01/01/2023 09:13 AM    Nitrites Negative 01/01/2023 09:13 AM    Leukocyte Esterase Negative 01/01/2023 09:13 AM    Epithelial cells FEW 01/01/2023 09:13 AM    Bacteria Negative 01/01/2023 09:13 AM    WBC 0-4 01/01/2023 09:13 AM    RBC 0-5 01/01/2023 09:13 AM         Medications Reviewed:     Current Facility-Administered Medications   Medication Dose Route Frequency    DOBUTamine (DOBUTREX) 500 mg/250 mL (2,000 mcg/mL) infusion  1 mcg/kg/min IntraVENous CONTINUOUS    empagliflozin (JARDIANCE) tablet 10 mg  10 mg Oral DAILY    albumin human 25% (BUMINATE) solution 12.5 g  12.5 g IntraVENous ONCE    albuterol-ipratropium (DUO-NEB) 2.5 MG-0.5 MG/3 ML  3 mL Nebulization Q6H PRN    insulin NPH (NOVOLIN N, HUMULIN N) injection 6 Units  6 Units SubCUTAneous Q12H    insulin lispro (HUMALOG) injection   SubCUTAneous AC&HS    dextrose 10 % infusion 0-250 mL  0-250 mL IntraVENous PRN    insulin lispro (HUMALOG) injection 5 Units  5 Units SubCUTAneous TID WITH MEALS    dextrose 10 % infusion 0-250 mL  0-250 mL IntraVENous PRN    digoxin (LANOXIN) tablet 0.125 mg  0.125 mg Oral DAILY    mirtazapine (REMERON) tablet 7.5 mg  7.5 mg Oral QHS    clopidogreL (PLAVIX) tablet 75 mg  75 mg Oral DAILY    [Held by provider] bumetanide (BUMEX) injection 2 mg  2 mg IntraVENous Q12H    [Held by provider] potassium chloride SR (KLOR-CON 10) tablet 40 mEq  40 mEq Oral BID    pantoprazole (PROTONIX) tablet 40 mg  40 mg Oral ACB    heparin (porcine) injection 5,000 Units  5,000 Units SubCUTAneous Q12H    QUEtiapine (SEROquel) tablet 50 mg  50 mg Oral QHS    lidocaine 4 % patch 1 Patch  1 Patch TransDERmal Q24H    balsam peru-castor oiL (VENELEX) ointment   Topical BID    alteplase (CATHFLO) 1 mg in sterile water (preservative free) 1 mL injection  1 mg InterCATHeter PRN    bacitracin 500 unit/gram packet 1 Packet  1 Packet Topical PRN    glucose chewable tablet 16 g  4 Tablet Oral PRN    glucagon (GLUCAGEN) injection 1 mg  1 mg IntraMUSCular PRN    dextrose 10 % infusion 0-250 mL  0-250 mL IntraVENous PRN    senna-docusate (PERICOLACE) 8.6-50 mg per tablet 1 Tablet  1 Tablet Oral BID PRN    bisacodyL (DULCOLAX) suppository 10 mg  10 mg Rectal QHS PRN    sodium chloride (NS) flush 5-40 mL  5-40 mL IntraVENous Q8H    sodium chloride (NS) flush 5-40 mL  5-40 mL IntraVENous PRN    acetaminophen (TYLENOL) tablet 650 mg  650 mg Oral Q6H PRN    Or    acetaminophen (TYLENOL) suppository 650 mg  650 mg Rectal Q6H PRN    polyethylene glycol (MIRALAX) packet 17 g  17 g Oral DAILY PRN ondansetron (ZOFRAN ODT) tablet 4 mg  4 mg Oral Q8H PRN    Or    ondansetron (ZOFRAN) injection 4 mg  4 mg IntraVENous Q6H PRN    aspirin delayed-release tablet 81 mg  81 mg Oral DAILY    ipratropium (ATROVENT) 21 mcg (0.03 %) nasal spray 2 Spray  2 Spray Both Nostrils Q12H    tamsulosin (FLOMAX) capsule 0.4 mg  0.4 mg Oral DAILY    sodium chloride (NS) flush 5-40 mL  5-40 mL IntraVENous PRN     ______________________________________________________________________  EXPECTED LENGTH OF STAY: 5d 0h  ACTUAL LENGTH OF STAY:          15                 Pat Baugh MD

## 2023-01-07 ENCOUNTER — APPOINTMENT (OUTPATIENT)
Dept: NON INVASIVE DIAGNOSTICS | Age: 73
DRG: 871 | End: 2023-01-07
Attending: INTERNAL MEDICINE
Payer: MEDICARE

## 2023-01-07 LAB
ALBUMIN SERPL-MCNC: 3.1 G/DL (ref 3.5–5)
ALBUMIN/GLOB SERPL: 1 (ref 1.1–2.2)
ALP SERPL-CCNC: 172 U/L (ref 45–117)
ALT SERPL-CCNC: 95 U/L (ref 12–78)
ANION GAP SERPL CALC-SCNC: 4 MMOL/L (ref 5–15)
AST SERPL-CCNC: 40 U/L (ref 15–37)
BASOPHILS # BLD: 0.1 K/UL (ref 0–0.1)
BASOPHILS NFR BLD: 1 % (ref 0–1)
BILIRUB SERPL-MCNC: 0.4 MG/DL (ref 0.2–1)
BNP SERPL-MCNC: 5370 PG/ML
BUN SERPL-MCNC: 40 MG/DL (ref 6–20)
BUN/CREAT SERPL: 37 (ref 12–20)
CALCIUM SERPL-MCNC: 9.1 MG/DL (ref 8.5–10.1)
CHLORIDE SERPL-SCNC: 107 MMOL/L (ref 97–108)
CO2 SERPL-SCNC: 26 MMOL/L (ref 21–32)
CREAT SERPL-MCNC: 1.07 MG/DL (ref 0.7–1.3)
DIFFERENTIAL METHOD BLD: ABNORMAL
DIGOXIN SERPL-MCNC: 0.8 NG/ML (ref 0.9–2)
EOSINOPHIL # BLD: 0.4 K/UL (ref 0–0.4)
EOSINOPHIL NFR BLD: 5 % (ref 0–7)
ERYTHROCYTE [DISTWIDTH] IN BLOOD BY AUTOMATED COUNT: 21.8 % (ref 11.5–14.5)
GLOBULIN SER CALC-MCNC: 3.2 G/DL (ref 2–4)
GLUCOSE BLD STRIP.AUTO-MCNC: 111 MG/DL (ref 65–117)
GLUCOSE BLD STRIP.AUTO-MCNC: 188 MG/DL (ref 65–117)
GLUCOSE BLD STRIP.AUTO-MCNC: 224 MG/DL (ref 65–117)
GLUCOSE BLD STRIP.AUTO-MCNC: 249 MG/DL (ref 65–117)
GLUCOSE BLD STRIP.AUTO-MCNC: 293 MG/DL (ref 65–117)
GLUCOSE SERPL-MCNC: 110 MG/DL (ref 65–100)
HCT VFR BLD AUTO: 35.7 % (ref 36.6–50.3)
HGB BLD-MCNC: 10.1 G/DL (ref 12.1–17)
IMM GRANULOCYTES # BLD AUTO: 0.1 K/UL (ref 0–0.04)
IMM GRANULOCYTES NFR BLD AUTO: 1 % (ref 0–0.5)
LYMPHOCYTES # BLD: 1.2 K/UL (ref 0.8–3.5)
LYMPHOCYTES NFR BLD: 15 % (ref 12–49)
MAGNESIUM SERPL-MCNC: 2.2 MG/DL (ref 1.6–2.4)
MCH RBC QN AUTO: 22.9 PG (ref 26–34)
MCHC RBC AUTO-ENTMCNC: 28.3 G/DL (ref 30–36.5)
MCV RBC AUTO: 80.8 FL (ref 80–99)
MONOCYTES # BLD: 0.5 K/UL (ref 0–1)
MONOCYTES NFR BLD: 6 % (ref 5–13)
NEUTS SEG # BLD: 5.4 K/UL (ref 1.8–8)
NEUTS SEG NFR BLD: 72 % (ref 32–75)
NRBC # BLD: 0 K/UL (ref 0–0.01)
NRBC BLD-RTO: 0 PER 100 WBC
PHOSPHATE SERPL-MCNC: 2.1 MG/DL (ref 2.6–4.7)
PLATELET # BLD AUTO: 273 K/UL (ref 150–400)
PMV BLD AUTO: 11.4 FL (ref 8.9–12.9)
POTASSIUM SERPL-SCNC: 4.4 MMOL/L (ref 3.5–5.1)
PROCALCITONIN SERPL-MCNC: 0.26 NG/ML
PROT SERPL-MCNC: 6.3 G/DL (ref 6.4–8.2)
RBC # BLD AUTO: 4.42 M/UL (ref 4.1–5.7)
RBC MORPH BLD: ABNORMAL
SERVICE CMNT-IMP: ABNORMAL
SERVICE CMNT-IMP: NORMAL
SODIUM SERPL-SCNC: 137 MMOL/L (ref 136–145)
WBC # BLD AUTO: 7.7 K/UL (ref 4.1–11.1)

## 2023-01-07 PROCEDURE — 74011636637 HC RX REV CODE- 636/637: Performed by: CLINICAL NURSE SPECIALIST

## 2023-01-07 PROCEDURE — 74011250637 HC RX REV CODE- 250/637: Performed by: INTERNAL MEDICINE

## 2023-01-07 PROCEDURE — 99233 SBSQ HOSP IP/OBS HIGH 50: CPT | Performed by: NURSE PRACTITIONER

## 2023-01-07 PROCEDURE — 74011250637 HC RX REV CODE- 250/637: Performed by: NURSE PRACTITIONER

## 2023-01-07 PROCEDURE — 83880 ASSAY OF NATRIURETIC PEPTIDE: CPT

## 2023-01-07 PROCEDURE — 74011250636 HC RX REV CODE- 250/636: Performed by: STUDENT IN AN ORGANIZED HEALTH CARE EDUCATION/TRAINING PROGRAM

## 2023-01-07 PROCEDURE — 83735 ASSAY OF MAGNESIUM: CPT

## 2023-01-07 PROCEDURE — 65660000001 HC RM ICU INTERMED STEPDOWN

## 2023-01-07 PROCEDURE — 80053 COMPREHEN METABOLIC PANEL: CPT

## 2023-01-07 PROCEDURE — 74011000250 HC RX REV CODE- 250: Performed by: HOSPITALIST

## 2023-01-07 PROCEDURE — 74011000250 HC RX REV CODE- 250: Performed by: STUDENT IN AN ORGANIZED HEALTH CARE EDUCATION/TRAINING PROGRAM

## 2023-01-07 PROCEDURE — 74011250637 HC RX REV CODE- 250/637: Performed by: STUDENT IN AN ORGANIZED HEALTH CARE EDUCATION/TRAINING PROGRAM

## 2023-01-07 PROCEDURE — 82962 GLUCOSE BLOOD TEST: CPT

## 2023-01-07 PROCEDURE — 51798 US URINE CAPACITY MEASURE: CPT

## 2023-01-07 PROCEDURE — 80162 ASSAY OF DIGOXIN TOTAL: CPT

## 2023-01-07 PROCEDURE — 85025 COMPLETE CBC W/AUTO DIFF WBC: CPT

## 2023-01-07 PROCEDURE — 74011250637 HC RX REV CODE- 250/637: Performed by: FAMILY MEDICINE

## 2023-01-07 PROCEDURE — 84100 ASSAY OF PHOSPHORUS: CPT

## 2023-01-07 PROCEDURE — 36415 COLL VENOUS BLD VENIPUNCTURE: CPT

## 2023-01-07 PROCEDURE — 84145 PROCALCITONIN (PCT): CPT

## 2023-01-07 RX ORDER — LIDOCAINE HYDROCHLORIDE 20 MG/ML
JELLY TOPICAL ONCE
Status: COMPLETED | OUTPATIENT
Start: 2023-01-07 | End: 2023-01-07

## 2023-01-07 RX ORDER — LANOLIN ALCOHOL/MO/W.PET/CERES
3 CREAM (GRAM) TOPICAL
Status: DISCONTINUED | OUTPATIENT
Start: 2023-01-07 | End: 2023-01-19 | Stop reason: HOSPADM

## 2023-01-07 RX ADMIN — INSULIN GLARGINE 8 UNITS: 100 INJECTION, SOLUTION SUBCUTANEOUS at 21:34

## 2023-01-07 RX ADMIN — CLOPIDOGREL BISULFATE 75 MG: 75 TABLET ORAL at 08:52

## 2023-01-07 RX ADMIN — Medication: at 18:41

## 2023-01-07 RX ADMIN — MIRTAZAPINE 7.5 MG: 15 TABLET, FILM COATED ORAL at 21:34

## 2023-01-07 RX ADMIN — LIDOCAINE HYDROCHLORIDE: 20 JELLY TOPICAL at 03:31

## 2023-01-07 RX ADMIN — Medication 3 UNITS: at 21:34

## 2023-01-07 RX ADMIN — IPRATROPIUM BROMIDE 2 SPRAY: 21 SPRAY, METERED NASAL at 23:56

## 2023-01-07 RX ADMIN — SODIUM CHLORIDE, PRESERVATIVE FREE 10 ML: 5 INJECTION INTRAVENOUS at 07:36

## 2023-01-07 RX ADMIN — SODIUM CHLORIDE, PRESERVATIVE FREE 10 ML: 5 INJECTION INTRAVENOUS at 21:36

## 2023-01-07 RX ADMIN — EMPAGLIFLOZIN 10 MG: 10 TABLET, FILM COATED ORAL at 08:52

## 2023-01-07 RX ADMIN — HEPARIN SODIUM 5000 UNITS: 5000 INJECTION INTRAVENOUS; SUBCUTANEOUS at 08:52

## 2023-01-07 RX ADMIN — Medication: at 08:53

## 2023-01-07 RX ADMIN — DIGOXIN 0.12 MG: 125 TABLET ORAL at 08:52

## 2023-01-07 RX ADMIN — Medication 2 UNITS: at 13:19

## 2023-01-07 RX ADMIN — Medication 3 MG: at 23:55

## 2023-01-07 RX ADMIN — SODIUM CHLORIDE, PRESERVATIVE FREE 10 ML: 5 INJECTION INTRAVENOUS at 03:31

## 2023-01-07 RX ADMIN — HEPARIN SODIUM 5000 UNITS: 5000 INJECTION INTRAVENOUS; SUBCUTANEOUS at 21:34

## 2023-01-07 RX ADMIN — ASPIRIN 81 MG: 81 TABLET, COATED ORAL at 08:52

## 2023-01-07 RX ADMIN — PANTOPRAZOLE SODIUM 40 MG: 40 TABLET, DELAYED RELEASE ORAL at 07:36

## 2023-01-07 RX ADMIN — IPRATROPIUM BROMIDE 2 SPRAY: 21 SPRAY, METERED NASAL at 08:53

## 2023-01-07 RX ADMIN — TAMSULOSIN HYDROCHLORIDE 0.4 MG: 0.4 CAPSULE ORAL at 08:52

## 2023-01-07 RX ADMIN — QUETIAPINE FUMARATE 50 MG: 25 TABLET ORAL at 21:34

## 2023-01-07 RX ADMIN — Medication 3 UNITS: at 17:23

## 2023-01-07 NOTE — PROGRESS NOTES
0108 post void (100mL) bladder scan showed 767mL. Attempted to straight cath twice with two separate RN's. Cath did not travel past prostate. Will attempt again with coude cath  0330 pt voided an additional 400mL, Bladder scan 662mL. Coude cath used and 600mL out. Repeat bladder scan showed 86mL. Problem: Diabetes Self-Management  Goal: *Disease process and treatment process  Description: Define diabetes and identify own type of diabetes; list 3 options for treating diabetes. Outcome: Progressing Towards Goal  Goal: *Incorporating nutritional management into lifestyle  Description: Describe effect of type, amount and timing of food on blood glucose; list 3 methods for planning meals. Outcome: Progressing Towards Goal  Goal: *Incorporating physical activity into lifestyle  Description: State effect of exercise on blood glucose levels. Outcome: Progressing Towards Goal  Goal: *Developing strategies to promote health/change behavior  Description: Define the ABC's of diabetes; identify appropriate screenings, schedule and personal plan for screenings. Outcome: Progressing Towards Goal  Goal: *Using medications safely  Description: State effect of diabetes medications on diabetes; name diabetes medication taking, action and side effects. Outcome: Progressing Towards Goal  Goal: *Monitoring blood glucose, interpreting and using results  Description: Identify recommended blood glucose targets  and personal targets. Outcome: Progressing Towards Goal  Goal: *Prevention, detection, treatment of acute complications  Description: List symptoms of hyper- and hypoglycemia; describe how to treat low blood sugar and actions for lowering  high blood glucose level. Outcome: Progressing Towards Goal  Goal: *Prevention, detection and treatment of chronic complications  Description: Define the natural course of diabetes and describe the relationship of blood glucose levels to long term complications of diabetes.   Outcome: Progressing Towards Goal  Goal: *Developing strategies to address psychosocial issues  Description: Describe feelings about living with diabetes; identify support needed and support network  Outcome: Progressing Towards Goal     Problem: Falls - Risk of  Goal: *Absence of Falls  Description: Document Remigio King Fall Risk and appropriate interventions in the flowsheet.   Outcome: Progressing Towards Goal  Note: Fall Risk Interventions:  Mobility Interventions: Assess mobility with egress test, Communicate number of staff needed for ambulation/transfer, Patient to call before getting OOB         Medication Interventions: Assess postural VS orthostatic hypotension, Evaluate medications/consider consulting pharmacy, Patient to call before getting OOB    Elimination Interventions: Call light in reach, Patient to call for help with toileting needs    History of Falls Interventions: Door open when patient unattended, Evaluate medications/consider consulting pharmacy         Problem: Patient Education: Go to Patient Education Activity  Goal: Patient/Family Education  Outcome: Progressing Towards Goal     Problem: Heart Failure: Discharge Outcomes  Goal: *Demonstrates ability to perform prescribed activity without shortness of breath or discomfort  Outcome: Progressing Towards Goal  Goal: *Left ventricular function assessment completed prior to or during stay, or planned for post-discharge  Outcome: Progressing Towards Goal  Goal: *ACEI prescribed if LVEF less than 40% and no contraindications or ARB prescribed  Outcome: Progressing Towards Goal  Goal: *Verbalizes understanding and describes prescribed diet  Outcome: Progressing Towards Goal  Goal: *Verbalizes understanding/describes prescribed medications  Outcome: Progressing Towards Goal  Goal: *Describes available resources and support systems  Description: (eg: Home Health, Palliative Care, Advanced Medical Directive)  Outcome: Progressing Towards Goal  Goal: *Understands and describes signs and symptoms to report to providers(Stroke Metric)  Outcome: Progressing Towards Goal  Goal: *Describes/verbalizes understanding of follow-up/return appt  Description: (eg: to physicians, diabetes treatment coordinator, and other resources  Outcome: Progressing Towards Goal  Goal: *Describes importance of continuing daily weights and changes to report to physician  Outcome: Progressing Towards Goal     Problem: Patient Education: Go to Patient Education Activity  Goal: Patient/Family Education  Outcome: Progressing Towards Goal     Problem: Patient Education: Go to Patient Education Activity  Goal: Patient/Family Education  Outcome: Progressing Towards Goal     Problem: Pressure Injury - Risk of  Goal: *Prevention of pressure injury  Description: Document Mike Scale and appropriate interventions in the flowsheet.   Outcome: Progressing Towards Goal  Note: Pressure Injury Interventions:  Sensory Interventions: Assess changes in LOC, Keep linens dry and wrinkle-free, Minimize linen layers    Moisture Interventions: Absorbent underpads, Minimize layers    Activity Interventions: Pressure redistribution bed/mattress(bed type), Increase time out of bed    Mobility Interventions: Assess need for specialty bed, Pressure redistribution bed/mattress (bed type)    Nutrition Interventions: Document food/fluid/supplement intake    Friction and Shear Interventions: Lift sheet                Problem: Infection - Risk of, Urinary Catheter-Associated Urinary Tract Infection  Goal: *Absence of infection signs and symptoms  Outcome: Progressing Towards Goal     Problem: Nutrition Deficit  Goal: *Optimize nutritional status  Outcome: Progressing Towards Goal     Problem: Pain  Goal: *Control of Pain  Outcome: Progressing Towards Goal  Goal: *PALLIATIVE CARE:  Alleviation of Pain  Outcome: Progressing Towards Goal

## 2023-01-07 NOTE — PROGRESS NOTES
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, LVEF 25-30%, stage C, NYHA class IIIA  Unable to up-titrate GDMT for HF due to hypotension and orthostasis likely due to overdiuresis; now weaning dobutamine gtt as BP, CrCl, pro-NT-BNP and lactic improves with holding diuretics  Most likely etiology of cardiomyopathy: CAD +/- TRISTAN +/- inappropriate sinus tach +/- undergoing workup (cardiac MRI with ischemic CM, PYP equivocal, gammopathy and genetics pending)  In order to determine prognosis and timing of consideration for LVAD-DT evaluation; patient will need RHC off inotropes; scheduled for 23     RECOMMENDATIONS:  RHC on Monday  Hold Dobutamine today and monitor response   Continue to hold diuretics today  Once orthosthasis resolves then will start ARB/ARNi  Once hyponatremia improves will start MRA  Continue jardiance 10mg daily  NICOM tomorrow off inotropes  Orthostatics daily   6 minute walk- will need PT  Obtain MOCA test (screening test for LVAD/inotrope candidacy)  Inpatient sleep medicine consult pending (high risk for TRISTAN)  Continue digoxin 0.125mcg daily, check digoxin level daily (today 0.7, goal 0.7-1.2)  NOTE: insulin needs adjustments with wean of steroids/jardiance  Repeat echo off inotropes on Monday  Uric acid level 6.8  Cannot tolerate beta-blockers due to dobutamine  Cannot tolerate ACEi/ARB/ARNi/MRA due to acute renal failure  Continue baby aspirin and plavix  DNR  Physical therapy/ambulate daily    All other care per primary team,      IMPRESSION:  Acute on chronic combined systolic/diastolic  heart failure  Stage D, NYHA class IV symptoms  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 Patient's personal goals include: TBD  Important upcoming milestones or family events: TBD  The patient identifies the following as important for living well: TBD     Patient assessed. High suspicion of sleep apnea due to the following reasons. Will refer for sleep evaluation. [x] Heart Failure  [] Hypertention  [x] Atrial Fibrillation  [] BMI > 30  [] History of stroke  [] Diabetes  [x] Heavy snoring  [] Witnessed apnea  [] Hypoxemia                    HPI:  71 y.o. male who was admitted via ED for worsening SOB, poor appetite, orthopnea and fatigue. Pmhx of CAD s/p CABG, PAD, DM 2, recent admission for HFmrEF earlier this month. Serial TTEs show progressive decline in EF since 3/2021 with the most recent EF at 25-30%. Recent LHC shows diffuse CAD and no interventions indicated. Patient had [place] recently and there is some thought to myocarditis or other etiology causing worsening HF. [facility] was consulted for further evaluation and management of HFrEF. CARDIAC IMAGING:  Echo 12/26/22    Left Ventricle: Severely reduced left ventricular systolic function with a visually estimated EF of 25 - 30%. Left ventricle size is normal. Normal wall thickness. There are regional wall motion abnormalities. Grade II diastolic dysfunction with increased LAP.     Right Ventricle: Moderately reduced systolic function. TAPSE is abnormal. TAPSE is 1.1 cm. Aortic Valve: Mild stenosis of the aortic valve. AV peak gradient is 13 mmHg. AV peak velocity is 1.8 m/s. Mitral Valve: Not well visualized. Moderate annular calcification at the posterior leaflet of the mitral valve. Mild to moderate regurgitation. Tricuspid Valve: Mildly elevated RVSP. Left Atrium: Left atrium is moderately dilated. 12/8/22    Left Ventricle: Moderately reduced left ventricular systolic function with a visually estimated EF of 35 - 40%. Severe hypokinesis of the following segments: mid anteroseptal, apical anterior, apical septal, apical inferior and apical lateral. Severe hypokinesis of the apex. Mitral Valve: Severely thickened leaflet, at the anterior and posterior leaflets. Severely calcified leaflet, at the anterior and posterior leaflets. Mild annular calcification of the mitral valve. Moderate regurgitation. Left Atrium: Left atrium is mildly dilated. Contrast used: Definity. limited study     EKG 12/22/22 ST, Biatria enlargement, marked ST abnormality     LHC 12/6/22  1. Normal LVEDP  2. Severe native multivessel coronary artery disease  3. Patent LIMA to LAD and vein graft to distal RCA  4. Recurrent ISR in OM1 stent with now 60 to 70% restenosis  5. Recoil of left main and circumflex stent with now recurrent 40 to 50% stenosis. 6.  Progression of ostial left main disease now to about 60% stenosis  7. Progression of disease in jailed first marginal branch now with diffuse 90% stenosis  8.   High-grade stenosis in the mid to distal right potential femoral artery treated with 6 x 40 mm impact drug-coated balloon angioplasty to reduce the stenosis to less than 40%     NST        HEMODYNAMICS:  RHC not done  CPEST too ill   6MW too ill    PHYSICAL EXAM:  Visit Vitals  BP (!) 132/55   Pulse (!) 101   Temp 98.6 °F (37 °C)   Resp 16   Ht 5' 9\" (1.753 m)   Wt 114 lb 13.8 oz (52.1 kg)   SpO2 94%   BMI 16.96 kg/m²     Physical Exam   Constitutional: He appears healthy. No distress. Cardiovascular: Normal rate, regular rhythm, normal heart sounds and normal pulses. Pulmonary/Chest: Effort normal and breath sounds normal.   Abdominal: Soft. Bowel sounds are normal. He exhibits no distension. Neurological: He is alert and oriented to person, place, and time. Skin: Skin is warm and dry. REVIEW OF SYSTEMS:  Review of Systems   Constitutional:  Negative for chills, fever and malaise/fatigue. Respiratory:  Negative for cough and shortness of breath. Cardiovascular:  Negative for chest pain, palpitations and leg swelling. Gastrointestinal:  Negative for nausea and vomiting. Musculoskeletal:  Negative for falls and myalgias. Neurological:  Positive for weakness. Negative for dizziness and headaches. Psychiatric/Behavioral:  Negative for depression.         PAST MEDICAL HISTORY:  Past Medical History:   Diagnosis Date    CAD (coronary artery disease) 11/10/2016    NSTEMI & 2 stents    Deafness 10/28/2012    DM (diabetes mellitus) ([facility] [address]     Elevated cholesterol     Hypertension     NSTEMI (non-ST elevated myocardial infarction) ([facility] [address] 11/10/2016       PAST SURGICAL HISTORY:  Past Surgical History:   Procedure Laterality Date    COLONOSCOPY N/A 6/28/2018    COLONOSCOPY performed by Mortimer Smiling, MD at [address] [place] [address] ENDOSCOPY    [facility]  11/11/2016    2 stents       FAMILY HISTORY:  Family History   Problem Relation Age of Onset    Heart Disease Father     Heart Attack Father     Hypertension Mother     Elevated Lipids Brother     Elevated Lipids Brother     No Known Problems Sister     Elevated Lipids Brother     No Known Problems Son     No Known Problems Daughter     Anesth Problems Neg Hx        SOCIAL HISTORY:  Social History     Socioeconomic History    Marital status:    Tobacco Use    Smoking status: Never     Passive exposure: Never    Smokeless tobacco: Never   Vaping Use    Vaping Use: Never used   Substance and Sexual Activity    Alcohol use: Yes     Alcohol/week: 2.0 standard drinks     Types: 1 Cans of beer, 1 Shots of liquor per week     Comment: rarely    Drug use: No    Sexual activity: Yes     Social Determinants of Health     Financial Resource Strain: Medium Risk    Difficulty of Paying Living Expenses: Somewhat hard   Food Insecurity: Food Insecurity Present    Worried About Running Out of Food in the Last Year: Never true    Ran Out of Food in the Last Year: Often true       LABORATORY RESULTS:     Labs Latest Ref Rng & Units 1/7/2023 1/6/2023 1/5/2023 1/4/2023 1/4/2023 1/3/2023 1/2/2023   WBC 4.1 - 11.1 K/uL 7.7 7.9 12. 3(H) - 13. 4(H) 15. 3(H) 17. 3(H)   RBC 4.10 - 5.70 M/uL 4.42 3.90(L) 4.24 - 4.55 4.39 4.37   Hemoglobin 12.1 - 17.0 g/dL 10. 1(L) 9.0(L) 9.6(L) - 10. 4(L) 10. 2(L) 10. 0(L)   Hematocrit 36.6 - 50.3 % 35. 7(L) 31. 3(L) 33. 4(L) - 35. 6(L) 33. 3(L) 33. 8(L)   MCV 80.0 - 99.0 FL 80.8 80.3 78. 8(L) - 78. 2(L) 75. 9(L) 77. 3(L)   Platelets 663 - 442 K/uL 273 247 297 - 319 282 305   Lymphocytes 12 - 49 % 15 11(L) 9(L) - 4(L) 5(L) 4(L)   Monocytes 5 - 13 % 6 8 7 - 3(L) 3(L) 3(L)   Eosinophils 0 - 7 % 5 3 2 - 0 0 0   Basophils 0 - 1 % 1 1 1 - 1 1 0   Albumin 3.5 - 5.0 g/dL 3. 1(L) 3.0(L) 3.4(L) - 3.6 3.7 4.0   Calcium 8.5 - 10.1 MG/DL 9.1 9.4 9.9 10. 4(H) 10. 7(H) 10. 2(H) 10. 3(H)   Glucose 65 - 100 mg/dL 110(H) 177(H) 75 - 127(H) 110(H) 256(H)   BUN 6 - 20 MG/DL 40(H) 56(H) 77(H) - 96(H) 101(H) 105(H)   Creatinine 0.70 - 1.30 MG/DL 1.07 1.21 1.34(H) - 1.57(H) 1.65(H) 1.93(H)   Sodium 136 - 145 mmol/L 137 133(L) 133(L) - 135(L) 134(L) 137   Potassium 3.5 - 5.1 mmol/L 4.4 4.3 4.2 - 4.4 4.4 4.6   TSH 0.36 - 3.74 uIU/mL - - - - - - -   PSA 0.01 - 4.0 ng/mL - - - - - - -   Some recent data might be hidden     Lab Results   Component Value Date/Time    TSH 2.12 12/27/2022 02:36 PM    TSH 4.80 (H) 12/06/2022 03:53 AM    TSH 5.39 (H) 10/12/2022 09:10 AM    TSH 3.53 02/03/2022 11:47 AM    TSH 5.790 (H) 11/21/2019 04:45 PM    TSH 3.08 06/22/2018 01:53 PM    TSH 4.250 05/26/2015 09:43 AM       ALLERGY:  No Known Allergies     CURRENT MEDICATIONS:    Current Facility-Administered Medications:     DOBUTamine (DOBUTREX) 500 mg/250 mL (2,000 mcg/mL) infusion, 1 mcg/kg/min, IntraVENous, CONTINUOUS, Danuta Viveros MD, Last Rate: 1.6 mL/hr at 01/06/23 1137, 1 mcg/kg/min at 01/06/23 1137    empagliflozin (JARDIANCE) tablet 10 mg, 10 mg, Oral, DAILY, Danuta Viveros MD, 10 mg at 01/06/23 1137    dextrose 10 % infusion 0-250 mL, 0-250 mL, IntraVENous, PRN, Cathy Sheldon, CNS    insulin glargine (LANTUS) injection 8 Units, 8 Units, SubCUTAneous, QHS, Cathy Sheldon CNS, 8 Units at 01/06/23 2225    albuterol-ipratropium (DUO-NEB) 2.5 MG-0.5 MG/3 ML, 3 mL, Nebulization, Q6H PRN, Christian Retana MD    insulin lispro (HUMALOG) injection, , SubCUTAneous, AC&HS, Cathy Sheldon CNS, 3 Units at 01/06/23 2225    digoxin (LANOXIN) tablet 0.125 mg, 0.125 mg, Oral, DAILY, Colleen Astorga NP, 0.125 mg at 01/06/23 1641    mirtazapine (REMERON) tablet 7.5 mg, 7.5 mg, Oral, QHS, Sergo Dueñas MD, 7.5 mg at 01/06/23 2225    clopidogreL (PLAVIX) tablet 75 mg, 75 mg, Oral, DAILY, Jacob Mccormick NP, 75 mg at 01/06/23 0852    [Held by provider] bumetanide (BUMEX) injection 2 mg, 2 mg, IntraVENous, Q12H, Desire Dodd MD, 2 mg at 01/03/23 0837    [Held by provider] potassium chloride SR (KLOR-CON 10) tablet 40 mEq, 40 mEq, Oral, BID, Walker Fernandez MD, 40 mEq at 01/03/23 0838    pantoprazole (PROTONIX) tablet 40 mg, 40 mg, Oral, ACB, Lonmalindae Bethanyles, DO, 40 mg at 01/07/23 0736    heparin (porcine) injection 5,000 Units, 5,000 Units, SubCUTAneous, Q12H, Jarrell Simsles, DO, 5,000 Units at 01/06/23 2225    QUEtiapine (SEROquel) tablet 50 mg, 50 mg, Oral, QHS, David Aguilar, DO, 50 mg at 01/06/23 2225    lidocaine 4 % patch 1 Patch, 1 Patch, TransDERmal, Q24H, Ralf MOLINA MD, 1 Patch at 01/05/23 1556    balsam peru-castor oiL (VENELEX) ointment, , Topical, BID, Jose Agudelo MD, Given at 01/06/23 0853    alteplase (CATHFLO) 1 mg in sterile water (preservative free) 1 mL injection, 1 mg, InterCATHeter, PRN, Eloy Landau, MD    bacitracin 500 unit/gram packet 1 Packet, 1 Packet, Topical, PRN, Ralf MOLINA MD    glucose chewable tablet 16 g, 4 Tablet, Oral, PRN, Ralf MOLINA MD    glucagon (GLUCAGEN) injection 1 mg, 1 mg, IntraMUSCular, PRN, Eloy Landau, MD    senna-docusate (PERICOLACE) 8.6-50 mg per tablet 1 Tablet, 1 Tablet, Oral, BID PRN, Eloy Landau, MD, 1 Tablet at 12/26/22 8434    bisacodyL (DULCOLAX) suppository 10 mg, 10 mg, Rectal, QHS PRN, Eloy Landau, MD    sodium chloride (NS) flush 5-40 mL, 5-40 mL, IntraVENous, Q8H, Walker Aponte MD, 10 mL at 01/07/23 0736    sodium chloride (NS) flush 5-40 mL, 5-40 mL, IntraVENous, PRN, Eloy Landau, MD, 10 mL at 12/28/22 0845    acetaminophen (TYLENOL) tablet 650 mg, 650 mg, Oral, Q6H PRN, 650 mg at 01/05/23 2234 **OR** acetaminophen (TYLENOL) suppository 650 mg, 650 mg, Rectal, Q6H PRN, Walker Harris MD    polyethylene glycol (MIRALAX) packet 17 g, 17 g, Oral, DAILY PRN, Eloy Landau, MD, 17 g at 12/26/22 0649    ondansetron (ZOFRAN ODT) tablet 4 mg, 4 mg, Oral, Q8H PRN **OR** ondansetron (ZOFRAN) injection 4 mg, 4 mg, IntraVENous, Q6H PRN, Ralf MOLINA MD, 4 mg at 12/24/22 0849    aspirin delayed-release tablet 81 mg, 81 mg, Oral, DAILY, Iraj Schmitz MD, 81 mg at 01/06/23 0852    ipratropium (ATROVENT) 21 mcg (0.03 %) nasal spray 2 Spray, 2 Spray, Both Nostrils, Q12H, Emerald Pearson MD, 2 Hazel at 01/06/23 2227    tamsulosin (FLOMAX) capsule 0.4 mg, 0.4 mg, Oral, DAILY, Ranjit Pearson MD, 0.4 mg at 01/06/23 0852    sodium chloride (NS) flush 5-40 mL, 5-40 mL, IntraVENous, PRN, Ralf Vidal MD JESSE    PATIENT CARE TEAM:  Patient Care Team:  Amando Ross MD as PCP - General (Family Medicine)  Amando Ross MD as PCP - [address] [place] [address] Provider  Katelynn Callaway MD (Cardiovascular Disease Physician)  Hermes Carreon MD (Gastroenterology)  General Pily MD (Cardiothoracic Surgery)  Olu Parker MD (Cardiovascular Disease Physician)  Lucita Kelly MD (Nephrology)  Jonah Lund RN as Care Transitions Nurse  Meredith Briceño MD (Pulmonary Disease)     Thank you for allowing me to participate in this patient's care.     Soraya Price NP   [address] [place] [address], [address]  Phone: [phone]

## 2023-01-07 NOTE — PROGRESS NOTES
Progress Note          Pt Name  Mary Alfonso   Date of Birth 1950   Medical Record Number  [id number]      Age  71 y.o. PCP Peña Smith MD   Admit date:  12/22/2022    Room Number  455/01  @ [facility]   Date of Service  1/7/2023     Admission Diagnoses:  Acute on chronic systolic congestive heart failure      Admission Summary:  \" Mary Alfonso is a 71 y.o. male who presents with progressively worsening shortness of breath for past several days. It has gotten worse to the point that he can only ambulate a few steps due to severe dyspnea and near syncope. Patient also noted abdominal distention which is increasing. Patient with known history of cardiomyopathy and recently admitted for CHF exacerbation a week ago. On presentation to the ER, chest x-ray shows diffuse airspace disease atypical infection versus edema. Also patient was noted to have elevated troponin and BNP. Patient was further evaluated by CT abdomen and pelvis which shows massively distended bladder with some bilateral hydronephrosis, subsequently Vasquez was placed. Patient also with significant leukocytosis as well as tachycardic and elevated lactic acid level and subsequently code sepsis was called. Hospitalist service requested admit the patient for further evaluation management. The patient denies any headache, blurry vision, sore throat, trouble swallowing, trouble with speech, chest pain, SOB, cough, fever, chills, N/V/D, abd pain, urinary symptoms, constipation, recent travels, sick contacts, focal or generalized neurological symptoms, falls, injuries, rashes, contact with COVID-19 diagnosed patients, hematemesis, melena, hemoptysis, hematuria, rashes, denies starting any new medications and denies any other concerns or problems besides as mentioned above.   \"     Assessment and plan:       Acute on chronic systolic congestive heart failure   Cardiogenic shock due to above - precluding comprehensive GDMT Rx for HF.   CAD   S/p CABG   Ischemic Cardiomyopathy     Appreciate help from Heart failure team.   Plan for RHC in two days from today. Dobutamine drip is being adjusted/held per HF team   Continue empagliflozin for assistance with HF   Continue Dig for HF assistance. TANNER on CKD stage III   Improving   Monitor daily weight , I/O     GERD -chronic   No change in current Rx. Depression  Hospital delirium  -  Continue Seroquel as is QHS - at 50mg   Continue Remeron as was PTA     T2DM -on Insulin   Controlled Romain diet   Insulin NPH Six units BID     BPH -chronic   Continue Tamsulosin     PAD   S/p Right SFA PTCA -followed by Dr. Mariela Ku     Body mass index is 16.96 kg/m². - severe protein romain malnutrition       Past Medical History:   Diagnosis Date    CAD (coronary artery disease) 11/10/2016    NSTEMI & 2 stents    Deafness 10/28/2012    DM (diabetes mellitus) ([facility])     Elevated cholesterol     Hypertension     NSTEMI (non-ST elevated myocardial infarction) ([facility] [address] 11/10/2016           CODE STATUS   Full    Functional Status  Pt is  and lives with his wife in [place]   Their daughter lives in [facility]     Surrogate decision maker:  Pt's wife and children      Prophylaxis   Lovenox   Discharge Plan:   PT, OT, RN,      Misc INPATIENT  Payor: [facility] MEDICARE / Plan: [address] [place] [address] MCR / Product Type: Managed Care Medicare /   There are currently no Active Isolations No active infections     Query   None noted today    Prognosis   Guarded    Social issues  1/7/23 2:02 PM I met with pt's children in the room, including his son who just arrived from [place]. We discussed medical issues in simple language. Then we discussed plan of care and possible discharge issues. We learned  that the pt's wife is unable to provide adequate care due to her ailments. They would very much appreciate help with DME, possible mobility devices for the pt to transport to second floor.             Subjective Data \"OK \"  Review of Systems - History obtained from child and the patient  General ROS: positive for  - fatigue  Respiratory ROS: no cough, shortness of breath, or wheezing  Cardiovascular ROS: no chest pain or dyspnea on exertion  Gastrointestinal ROS: no abdominal pain, change in bowel habits, or black or bloody stools    Objective Data       Comments  Thin built gentleman lying in bed in no distress   His children and other family members are in the room      Patient Vitals for the past 24 hrs:   BP   01/07/23 0708 (!) 132/55   01/07/23 0335 (!) 116/53   01/07/23 0049 (!) 132/58   01/06/23 1923 (!) 130/55   01/06/23 1506 (!) 117/51      Patient Vitals for the past 24 hrs:   Pulse   01/07/23 1000 (!) 104   01/07/23 0800 (!) 104   01/07/23 0708 (!) 101   01/07/23 0600 98   01/07/23 0400 99   01/07/23 0335 100   01/07/23 0200 99   01/07/23 0049 97   01/07/23 0001 (!) 103   01/06/23 2200 (!) 122   01/06/23 2002 98   01/06/23 1923 93   01/06/23 1800 91   01/06/23 1506 88   01/06/23 1400 93   01/06/23 1200 80      Patient Vitals for the past 24 hrs:   Resp   01/07/23 0708 16   01/07/23 0335 17   01/07/23 0049 16   01/06/23 1923 18   01/06/23 1506 15      Patient Vitals for the past 24 hrs:   Temp   01/07/23 0708 98.6 °F (37 °C)   01/07/23 0335 98.2 °F (36.8 °C)   01/07/23 0049 98 °F (36.7 °C)   01/06/23 1923 98.2 °F (36.8 °C)   01/06/23 1506 98 °F (36.7 °C)        SpO2 Readings from Last 6 Encounters:   01/07/23 94%   12/16/22 98%   12/08/22 97%   12/05/22 94%   11/16/22 99%   10/12/22 100%       O2 Flow Rate (L/min): 2 l/min  O2 Device: None (Room air) Body mass index is 16.96 kg/m².  -  Wt Readings from Last 10 Encounters:   01/07/23 52.1 kg (114 lb 13.8 oz)   12/19/22 58.5 kg (129 lb)   12/16/22 57.4 kg (126 lb 8.7 oz)   12/05/22 59 kg (130 lb)   12/05/22 59.1 kg (130 lb 3.2 oz)   11/16/22 61.2 kg (135 lb)   10/27/22 60.8 kg (134 lb)   10/27/22 60.8 kg (134 lb)   10/12/22 60.8 kg (134 lb)   09/13/22 60.3 kg (133 lb) Intake/Output Summary (Last 24 hours) at 1/7/2023 1154  Last data filed at 1/7/2023 0335  Gross per 24 hour   Intake 536.57 ml   Output 1100 ml   Net -563.43 ml         Physical Exam:             General:  Alert, cooperative,   MAL  noursished,   well developed,   appears stated age    Ears/Eyes:  Hearing intact  Sclera anicteric. Pupils equal   Mouth/Throat:  Mucous membranes normal pink and moist     Neck:     Lungs:  Chest excursion symmetrical   Auscultation B/L Symmetrical with   Vesicular breath sounds     No Crepitations noted          CVS:  Regular rate and rhythm   Systolic  murmur, MR and AS   No click, rub or gallop  S1 normal   S2 normal   Pedal pulses  b/l symmetrical   Abdomen:    Soft, non-tender  Bowel sounds normal     Extremities:  No cyanosis, jaundice  No edema noted   No sign of DVT/cord like lesion on palpation  .     Skin:  Skin color, texture, turgor normal. no acute rash or lesions    Lymph nodes:     Musculoskeletal Muscle bulk B/L symmetrical   Neuro Cranial nerves are intact,   motor movement b/l symmetrical,      Psych:  Alert and oriented,   normal mood & affect          Medications reviewed     Current Facility-Administered Medications   Medication Dose Route Frequency    [Held by provider] DOBUTamine (DOBUTREX) 500 mg/250 mL (2,000 mcg/mL) infusion  1 mcg/kg/min IntraVENous CONTINUOUS    empagliflozin (JARDIANCE) tablet 10 mg  10 mg Oral DAILY    dextrose 10 % infusion 0-250 mL  0-250 mL IntraVENous PRN    insulin glargine (LANTUS) injection 8 Units  8 Units SubCUTAneous QHS    albuterol-ipratropium (DUO-NEB) 2.5 MG-0.5 MG/3 ML  3 mL Nebulization Q6H PRN    insulin lispro (HUMALOG) injection   SubCUTAneous AC&HS    digoxin (LANOXIN) tablet 0.125 mg  0.125 mg Oral DAILY    mirtazapine (REMERON) tablet 7.5 mg  7.5 mg Oral QHS    clopidogreL (PLAVIX) tablet 75 mg  75 mg Oral DAILY    [Held by provider] bumetanide (BUMEX) injection 2 mg  2 mg IntraVENous Q12H    [Held by provider] potassium chloride SR (KLOR-CON 10) tablet 40 mEq  40 mEq Oral BID    pantoprazole (PROTONIX) tablet 40 mg  40 mg Oral ACB    heparin (porcine) injection 5,000 Units  5,000 Units SubCUTAneous Q12H    QUEtiapine (SEROquel) tablet 50 mg  50 mg Oral QHS    lidocaine 4 % patch 1 Patch  1 Patch TransDERmal Q24H    balsam peru-castor oiL (VENELEX) ointment   Topical BID    alteplase (CATHFLO) 1 mg in sterile water (preservative free) 1 mL injection  1 mg InterCATHeter PRN    bacitracin 500 unit/gram packet 1 Packet  1 Packet Topical PRN    glucose chewable tablet 16 g  4 Tablet Oral PRN    glucagon (GLUCAGEN) injection 1 mg  1 mg IntraMUSCular PRN    senna-docusate (PERICOLACE) 8.6-50 mg per tablet 1 Tablet  1 Tablet Oral BID PRN    bisacodyL (DULCOLAX) suppository 10 mg  10 mg Rectal QHS PRN    sodium chloride (NS) flush 5-40 mL  5-40 mL IntraVENous Q8H    sodium chloride (NS) flush 5-40 mL  5-40 mL IntraVENous PRN    acetaminophen (TYLENOL) tablet 650 mg  650 mg Oral Q6H PRN    Or    acetaminophen (TYLENOL) suppository 650 mg  650 mg Rectal Q6H PRN    polyethylene glycol (MIRALAX) packet 17 g  17 g Oral DAILY PRN    ondansetron (ZOFRAN ODT) tablet 4 mg  4 mg Oral Q8H PRN    Or    ondansetron (ZOFRAN) injection 4 mg  4 mg IntraVENous Q6H PRN    aspirin delayed-release tablet 81 mg  81 mg Oral DAILY    ipratropium (ATROVENT) 21 mcg (0.03 %) nasal spray 2 Spray  2 Spray Both Nostrils Q12H    tamsulosin (FLOMAX) capsule 0.4 mg  0.4 mg Oral DAILY    sodium chloride (NS) flush 5-40 mL  5-40 mL IntraVENous PRN       Relevant other informations: Other medical conditions listed in [facility] problem list section; all of these and other pertinent data were taken into consideration when treatment plan is developed and customized to this patient's unique overall circumstances and needs. We have reviewed available old medical records within the constraints of this admission process. Data Review:   Recent Days:   All Micro Results       Procedure Component Value Units Date/Time    URINE CULTURE HOLD SAMPLE [[id number]] Collected: 01/01/23 0913    Order Status: Completed Specimen: Urine Updated: 01/01/23 0916     Urine culture hold       Urine on hold in Microbiology dept for 2 days. If unpreserved urine is submitted, it cannot be used for addtional testing after 24 hours, recollection will be required. CULTURE, BLOOD, PAIRED [[id number]] Collected: 12/24/22 1754    Order Status: Completed Specimen: Blood Updated: 12/29/22 1952     Special Requests: NO SPECIAL REQUESTS        Culture result: NO GROWTH 5 DAYS       CULTURE, BLOOD, PAIRED [[id number]] Collected: 12/22/22 1339    Order Status: Completed Specimen: Blood Updated: 12/27/22 1555     Special Requests: NO SPECIAL REQUESTS        Culture result: NO GROWTH 5 DAYS       URINE CULTURE HOLD SAMPLE [[id number]] Collected: 12/24/22 1532    Order Status: Completed Specimen: Urine Updated: 12/24/22 1554     Urine culture hold       Urine on hold in Microbiology dept for 2 days. If unpreserved urine is submitted, it cannot be used for addtional testing after 24 hours, recollection will be required.           CULTURE, URINE [[id number]] Collected: 12/22/22 1423    Order Status: Completed Specimen: Cath Urine Updated: 12/23/22 1308     Special Requests: NO SPECIAL REQUESTS        Culture result: No growth (<1,000 CFU/ML)       RESPIRATORY VIRUS PANEL W/COVID-19, PCR [[id number]] Collected: 12/22/22 1532    Order Status: Completed Specimen: Nasopharyngeal Updated: 12/22/22 1704     Adenovirus Not detected        Coronavirus 229E Not detected        Coronavirus HKU1 Not detected        Coronavirus CVNL63 Not detected        Coronavirus OC43 Not detected        SARS-CoV-2, PCR Not detected        Metapneumovirus Not detected        Rhinovirus and Enterovirus Not detected        Influenza A Not detected        Influenza B Not detected        Parainfluenza 1 Not detected Parainfluenza 2 Not detected        Parainfluenza 3 Not detected        Parainfluenza virus 4 Not detected        RSV by PCR Not detected        B. parapertussis, PCR Not detected        Bordetella pertussis - PCR Not detected        Chlamydophila pneumoniae DNA, QL, PCR Not detected        Mycoplasma pneumoniae DNA, QL, PCR Not detected                Recent Labs     01/07/23  0536 01/06/23  0435 01/05/23  0022   WBC 7.7 7.9 12.3*   HGB 10.1* 9.0* 9.6*   HCT 35.7* 31.3* 33.4*    247 297     Recent Labs     01/07/23  0536 01/06/23  0435 01/05/23  0023 01/05/23  0022    133*  --  133*   K 4.4 4.3  --  4.2    102  --  98   CO2 26 25  --  27   * 177*  --  75   BUN 40* 56*  --  77*   CREA 1.07 1.21  --  1.34*   CA 9.1 9.4  --  9.9   MG 2.2 2.3 2.5*  --    PHOS 2.1* 2.3* 3.4  --    ALB 3.1* 3.0*  --  3.4*   TBILI 0.4 0.4  --  0.5   ALT 95* 116*  --  104*      Lab Results   Component Value Date/Time    TSH 2.12 12/27/2022 02:36 PM            Care Plan discussed with:Patient/Family, Nurse, and    Other medical conditions are listed in the active hospital problem list section; these and other pertinent data were taken into consideration when the treatment plan was developed and customized to this patient's unique overall circumstances and needs. High complexity decision making was performed for this patient who is at high risk for decompensation with multiple organ involvement. Today total floor/unit time was 40 minutes while caring for this patient and greater than 50% of that time was spent with patient (and/or family) coordinating patients clinical issues; this includes time spent during multidisciplinary rounds.         Valeria Hagan MD MPH FACP [facility]  1/7/2023          [delayed entry 1/8/2023 ]

## 2023-01-07 NOTE — PROGRESS NOTES
0340 NICOM Hemodynamic Monitoring     Visit Vitals  BP (!) 116/53   Pulse 100   Temp 98.2 °F (36.8 °C)   Resp 17   Ht 5' 9\" (1.753 m)   Wt 52.4 kg (115 lb 8.3 oz)   SpO2 93%   BMI 17.06 kg/m²         Baseline assessment:        CO 3.2        CI 1.9        SVI 19        SVV 17        TPR 1904

## 2023-01-08 LAB
ALBUMIN SERPL-MCNC: 3 G/DL (ref 3.5–5)
ALBUMIN/GLOB SERPL: 1 (ref 1.1–2.2)
ALP SERPL-CCNC: 220 U/L (ref 45–117)
ALT SERPL-CCNC: 111 U/L (ref 12–78)
ANION GAP SERPL CALC-SCNC: 6 MMOL/L (ref 5–15)
AST SERPL-CCNC: 65 U/L (ref 15–37)
BILIRUB SERPL-MCNC: 0.4 MG/DL (ref 0.2–1)
BNP SERPL-MCNC: 6957 PG/ML
BUN SERPL-MCNC: 36 MG/DL (ref 6–20)
BUN/CREAT SERPL: 29 (ref 12–20)
CALCIUM SERPL-MCNC: 9.1 MG/DL (ref 8.5–10.1)
CHLORIDE SERPL-SCNC: 108 MMOL/L (ref 97–108)
CO2 SERPL-SCNC: 24 MMOL/L (ref 21–32)
COMMENT, HOLDF: NORMAL
CREAT SERPL-MCNC: 1.26 MG/DL (ref 0.7–1.3)
DIGOXIN SERPL-MCNC: 0.9 NG/ML (ref 0.9–2)
GLOBULIN SER CALC-MCNC: 2.9 G/DL (ref 2–4)
GLUCOSE BLD STRIP.AUTO-MCNC: 146 MG/DL (ref 65–117)
GLUCOSE BLD STRIP.AUTO-MCNC: 161 MG/DL (ref 65–117)
GLUCOSE BLD STRIP.AUTO-MCNC: 208 MG/DL (ref 65–117)
GLUCOSE BLD STRIP.AUTO-MCNC: 346 MG/DL (ref 65–117)
GLUCOSE SERPL-MCNC: 135 MG/DL (ref 65–100)
MAGNESIUM SERPL-MCNC: 2.2 MG/DL (ref 1.6–2.4)
PHOSPHATE SERPL-MCNC: 2.7 MG/DL (ref 2.6–4.7)
POTASSIUM SERPL-SCNC: 4.8 MMOL/L (ref 3.5–5.1)
PROT SERPL-MCNC: 5.9 G/DL (ref 6.4–8.2)
SAMPLES BEING HELD,HOLD: NORMAL
SERVICE CMNT-IMP: ABNORMAL
SODIUM SERPL-SCNC: 138 MMOL/L (ref 136–145)

## 2023-01-08 PROCEDURE — 74011250637 HC RX REV CODE- 250/637: Performed by: STUDENT IN AN ORGANIZED HEALTH CARE EDUCATION/TRAINING PROGRAM

## 2023-01-08 PROCEDURE — 74011250637 HC RX REV CODE- 250/637: Performed by: NURSE PRACTITIONER

## 2023-01-08 PROCEDURE — 82962 GLUCOSE BLOOD TEST: CPT

## 2023-01-08 PROCEDURE — 83880 ASSAY OF NATRIURETIC PEPTIDE: CPT

## 2023-01-08 PROCEDURE — 65660000001 HC RM ICU INTERMED STEPDOWN

## 2023-01-08 PROCEDURE — 74011250637 HC RX REV CODE- 250/637: Performed by: INTERNAL MEDICINE

## 2023-01-08 PROCEDURE — 80162 ASSAY OF DIGOXIN TOTAL: CPT

## 2023-01-08 PROCEDURE — 51798 US URINE CAPACITY MEASURE: CPT

## 2023-01-08 PROCEDURE — 36415 COLL VENOUS BLD VENIPUNCTURE: CPT

## 2023-01-08 PROCEDURE — 80053 COMPREHEN METABOLIC PANEL: CPT

## 2023-01-08 PROCEDURE — 84100 ASSAY OF PHOSPHORUS: CPT

## 2023-01-08 PROCEDURE — 74011250636 HC RX REV CODE- 250/636: Performed by: STUDENT IN AN ORGANIZED HEALTH CARE EDUCATION/TRAINING PROGRAM

## 2023-01-08 PROCEDURE — 74011000250 HC RX REV CODE- 250: Performed by: STUDENT IN AN ORGANIZED HEALTH CARE EDUCATION/TRAINING PROGRAM

## 2023-01-08 PROCEDURE — 99233 SBSQ HOSP IP/OBS HIGH 50: CPT | Performed by: INTERNAL MEDICINE

## 2023-01-08 PROCEDURE — 74011250637 HC RX REV CODE- 250/637: Performed by: FAMILY MEDICINE

## 2023-01-08 PROCEDURE — 83735 ASSAY OF MAGNESIUM: CPT

## 2023-01-08 PROCEDURE — 74011636637 HC RX REV CODE- 636/637: Performed by: CLINICAL NURSE SPECIALIST

## 2023-01-08 PROCEDURE — 77030005513 HC CATH URETH FOL11 MDII -B

## 2023-01-08 RX ORDER — SPIRONOLACTONE 25 MG/1
12.5 TABLET ORAL DAILY
Status: DISCONTINUED | OUTPATIENT
Start: 2023-01-08 | End: 2023-01-19 | Stop reason: HOSPADM

## 2023-01-08 RX ORDER — BUMETANIDE 1 MG/1
1 TABLET ORAL DAILY
Status: DISCONTINUED | OUTPATIENT
Start: 2023-01-08 | End: 2023-01-11

## 2023-01-08 RX ADMIN — QUETIAPINE FUMARATE 50 MG: 25 TABLET ORAL at 21:26

## 2023-01-08 RX ADMIN — SODIUM CHLORIDE, PRESERVATIVE FREE 10 ML: 5 INJECTION INTRAVENOUS at 07:54

## 2023-01-08 RX ADMIN — HEPARIN SODIUM 5000 UNITS: 5000 INJECTION INTRAVENOUS; SUBCUTANEOUS at 08:22

## 2023-01-08 RX ADMIN — INSULIN GLARGINE 8 UNITS: 100 INJECTION, SOLUTION SUBCUTANEOUS at 21:26

## 2023-01-08 RX ADMIN — Medication 2 UNITS: at 07:54

## 2023-01-08 RX ADMIN — CLOPIDOGREL BISULFATE 75 MG: 75 TABLET ORAL at 08:21

## 2023-01-08 RX ADMIN — Medication 4 UNITS: at 21:40

## 2023-01-08 RX ADMIN — IPRATROPIUM BROMIDE 2 SPRAY: 21 SPRAY, METERED NASAL at 08:21

## 2023-01-08 RX ADMIN — PANTOPRAZOLE SODIUM 40 MG: 40 TABLET, DELAYED RELEASE ORAL at 07:54

## 2023-01-08 RX ADMIN — EMPAGLIFLOZIN 10 MG: 10 TABLET, FILM COATED ORAL at 08:21

## 2023-01-08 RX ADMIN — Medication 3 UNITS: at 13:05

## 2023-01-08 RX ADMIN — SODIUM CHLORIDE, PRESERVATIVE FREE 10 ML: 5 INJECTION INTRAVENOUS at 21:33

## 2023-01-08 RX ADMIN — MIRTAZAPINE 7.5 MG: 15 TABLET, FILM COATED ORAL at 21:26

## 2023-01-08 RX ADMIN — SODIUM CHLORIDE, PRESERVATIVE FREE 10 ML: 5 INJECTION INTRAVENOUS at 13:06

## 2023-01-08 RX ADMIN — ASPIRIN 81 MG: 81 TABLET, COATED ORAL at 08:20

## 2023-01-08 RX ADMIN — DIGOXIN 0.12 MG: 125 TABLET ORAL at 08:21

## 2023-01-08 RX ADMIN — HEPARIN SODIUM 5000 UNITS: 5000 INJECTION INTRAVENOUS; SUBCUTANEOUS at 21:26

## 2023-01-08 RX ADMIN — Medication: at 17:47

## 2023-01-08 RX ADMIN — Medication: at 08:21

## 2023-01-08 RX ADMIN — IPRATROPIUM BROMIDE 2 SPRAY: 21 SPRAY, METERED NASAL at 21:32

## 2023-01-08 RX ADMIN — TAMSULOSIN HYDROCHLORIDE 0.4 MG: 0.4 CAPSULE ORAL at 08:20

## 2023-01-08 NOTE — PROGRESS NOTES
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, LVEF 25-30%, stage C, NYHA class IIIA  Unable to up-titrate GDMT for HF due to hypotension and orthostasis likely due to overdiuresis; now weaning dobutamine gtt as BP, CrCl, pro-NT-BNP and lactic improves with holding diuretics  Most likely etiology of cardiomyopathy: CAD +/- TRISTAN +/- inappropriate sinus tach +/- undergoing workup (cardiac MRI with ischemic CM, PYP equivocal, gammopathy and genetics pending)  In order to determine prognosis and timing of consideration for LVAD-DT evaluation; patient will need RHC off inotropes; scheduled for 23     RECOMMENDATIONS:  RHC on Monday  Hold Dobutamine today and monitor response   Resume low dose bumex 1mg daily PO with recent weight gain   Once orthosthasis resolves then will start ARB/ARNi  Start low dose Spironolactone 12.5mg daily, watch for hyponatremia  Continue jardiance 10mg daily  NICOM today  Orthostatics daily   6 minute walk- will need PT  Obtain MOCA test (screening test for LVAD/inotrope candidacy)  Inpatient sleep medicine consult pending (high risk for TRISTAN)  Continue digoxin 0.125mcg daily, check digoxin level daily (today 0.7, goal 0.7-1.2)  NOTE: insulin needs adjustments with wean of steroids/jardiance  Repeat echo off inotropes on Monday  Uric acid level 6.8  Cannot tolerate beta-blockers due to dobutamine  Cannot tolerate ACEi/ARB/ARNi/MRA due to acute renal failure  Continue baby aspirin and plavix  DNR  Physical therapy/ambulate daily    All other care per primary team,      IMPRESSION:  Acute on chronic combined systolic/diastolic  heart failure  Stage D, NYHA class IV symptoms  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, LVEF 25-30% and 23% (by cMRI 1/3/23)  Undergoing evaluation for cardiac amyloidosis with cMRI  Coronary artery disease  CAD s/p CABG x 2: further disease best managed medically due to small vessel size   Peripheral arterial disease  Bilateral hydronephrosis s/p donnelly  Cardiac risk factors:  HTN  HL  TRISTAN, STOP-BANG 4  DM2  CKD, stage 3      INTERVAL EVENTS:  NAEO  Ambulating well  Improved appetite   Afebrile  Dobutamine held  -130s/50s, HR 80-90s  I/O net neg 1L  Wt up 3lbs  Cr 1.2  K 4.8  T Bili stable  LFTs up slightly today   PCT trending down      LIFE GOALS:  Lifestyle goals reviewed with the patient. Patient's personal goals include: TBD  Important upcoming milestones or family events: TBD  The patient identifies the following as important for living well: TBD     Patient assessed. High suspicion of sleep apnea due to the following reasons. Will refer for sleep evaluation. [x] Heart Failure  [] Hypertention  [x] Atrial Fibrillation  [] BMI > 30  [] History of stroke  [] Diabetes  [x] Heavy snoring  [] Witnessed apnea  [] Hypoxemia                    HPI:  71 y.o. male who was admitted via ED for worsening SOB, poor appetite, orthopnea and fatigue. Pmhx of CAD s/p CABG, PAD, DM 2, recent admission for HFmrEF earlier this month. Serial TTEs show progressive decline in EF since 3/2021 with the most recent EF at 25-30%. Recent LHC shows diffuse CAD and no interventions indicated. Patient had Larwence Amen recently and there is some thought to myocarditis or other etiology causing worsening HF. [facility] was consulted for further evaluation and management of HFrEF. CARDIAC IMAGING:  Echo 12/26/22    Left Ventricle: Severely reduced left ventricular systolic function with a visually estimated EF of 25 - 30%. Left ventricle size is normal. Normal wall thickness. There are regional wall motion abnormalities. Grade II diastolic dysfunction with increased LAP. Right Ventricle: Moderately reduced systolic function. TAPSE is abnormal. TAPSE is 1.1 cm. Aortic Valve: Mild stenosis of the aortic valve. AV peak gradient is 13 mmHg. AV peak velocity is 1.8 m/s. Mitral Valve: Not well visualized. Moderate annular calcification at the posterior leaflet of the mitral valve. Mild to moderate regurgitation. Tricuspid Valve: Mildly elevated RVSP. Left Atrium: Left atrium is moderately dilated. 12/8/22    Left Ventricle: Moderately reduced left ventricular systolic function with a visually estimated EF of 35 - 40%. Severe hypokinesis of the following segments: mid anteroseptal, apical anterior, apical septal, apical inferior and apical lateral. Severe hypokinesis of the apex. Mitral Valve: Severely thickened leaflet, at the anterior and posterior leaflets. Severely calcified leaflet, at the anterior and posterior leaflets. Mild annular calcification of the mitral valve. Moderate regurgitation. Left Atrium: Left atrium is mildly dilated. Contrast used: Definity. limited study     EKG 12/22/22 ST, Biatria enlargement, marked ST abnormality     C 12/6/22  1. Normal LVEDP  2. Severe native multivessel coronary artery disease  3. Patent LIMA to LAD and vein graft to distal RCA  4. Recurrent ISR in OM1 stent with now 60 to 70% restenosis  5. Recoil of left main and circumflex stent with now recurrent 40 to 50% stenosis. 6.  Progression of ostial left main disease now to about 60% stenosis  7. Progression of disease in jailed first marginal branch now with diffuse 90% stenosis  8.   High-grade stenosis in the mid to distal right potential femoral artery treated with 6 x 40 mm impact drug-coated balloon angioplasty to reduce the stenosis to less than 40%     NST        HEMODYNAMICS:  RHC not done  CPEST too ill   6MW too ill    PHYSICAL EXAM:  Visit Vitals  BP (!) 94/32 (BP 1 Location: Left upper arm, BP Patient Position: At rest)   Pulse 95   Temp 98.1 °F (36.7 °C)   Resp 20   Ht 5' 9\" (1.753 m)   Wt 114 lb 13.8 oz (52.1 kg)   SpO2 97%   BMI 16.96 kg/m²     Physical Exam   Constitutional: He appears healthy. No distress. Cardiovascular: Normal rate, regular rhythm, normal heart sounds and normal pulses. Pulmonary/Chest: Effort normal and breath sounds normal.   Abdominal: Soft. Bowel sounds are normal. He exhibits no distension. Neurological: He is alert and oriented to person, place, and time. Skin: Skin is warm and dry. REVIEW OF SYSTEMS:  Review of Systems   Constitutional:  Negative for chills, fever and malaise/fatigue. Respiratory:  Negative for cough and shortness of breath. Cardiovascular:  Negative for chest pain, palpitations and leg swelling. Gastrointestinal:  Negative for nausea and vomiting. Musculoskeletal:  Negative for falls and myalgias. Neurological:  Negative for dizziness, weakness and headaches. Psychiatric/Behavioral:  Negative for depression.         PAST MEDICAL HISTORY:  Past Medical History:   Diagnosis Date    CAD (coronary artery disease) 11/10/2016    NSTEMI & 2 stents    Deafness 10/28/2012    DM (diabetes mellitus) ([facility])     Elevated cholesterol     Hypertension     NSTEMI (non-ST elevated myocardial infarction) ([facility] [address] 11/10/2016       PAST SURGICAL HISTORY:  Past Surgical History:   Procedure Laterality Date    COLONOSCOPY N/A 6/28/2018    COLONOSCOPY performed by Laura Ibanez MD at [facility] ENDOSCOPY    [address]  11/11/2016    2 stents       FAMILY HISTORY:  Family History   Problem Relation Age of Onset    Heart Disease Father     Heart Attack Father     Hypertension Mother     Elevated Lipids Brother     Elevated Lipids Brother     No Known Problems Sister     Elevated Lipids Brother     No Known Problems Son     No Known Problems Daughter     Anesth Problems Neg Hx        SOCIAL HISTORY:  Social History Socioeconomic History    Marital status:    Tobacco Use    Smoking status: Never     Passive exposure: Never    Smokeless tobacco: Never   Vaping Use    Vaping Use: Never used   Substance and Sexual Activity    Alcohol use: Yes     Alcohol/week: 2.0 standard drinks     Types: 1 Cans of beer, 1 Shots of liquor per week     Comment: rarely    Drug use: No    Sexual activity: Yes     Social Determinants of Health     Financial Resource Strain: Medium Risk    Difficulty of Paying Living Expenses: Somewhat hard   Food Insecurity: Food Insecurity Present    Worried About Running Out of Food in the Last Year: Never true    Ran Out of Food in the Last Year: Often true       LABORATORY RESULTS:     Labs Latest Ref Rng & Units 1/8/2023 1/7/2023 1/6/2023 1/5/2023 1/4/2023 1/4/2023 1/3/2023   WBC 4.1 - 11.1 K/uL - 7.7 7.9 12. 3(H) - 13. 4(H) 15. 3(H)   RBC 4.10 - 5.70 M/uL - 4.42 3.90(L) 4.24 - 4.55 4.39   Hemoglobin 12.1 - 17.0 g/dL - 10. 1(L) 9.0(L) 9.6(L) - 10. 4(L) 10. 2(L)   Hematocrit 36.6 - 50.3 % - 35. 7(L) 31. 3(L) 33. 4(L) - 35. 6(L) 33. 3(L)   MCV 80.0 - 99.0 FL - 80.8 80.3 78. 8(L) - 78. 2(L) 75. 9(L)   Platelets 205 - 926 K/uL - 273 247 297 - 319 282   Lymphocytes 12 - 49 % - 15 11(L) 9(L) - 4(L) 5(L)   Monocytes 5 - 13 % - 6 8 7 - 3(L) 3(L)   Eosinophils 0 - 7 % - 5 3 2 - 0 0   Basophils 0 - 1 % - 1 1 1 - 1 1   Albumin 3.5 - 5.0 g/dL 3. 0(L) 3. 1(L) 3.0(L) 3.4(L) - 3.6 3.7   Calcium 8.5 - 10.1 MG/DL 9.1 9.1 9.4 9.9 10. 4(H) 10. 7(H) 10. 2(H)   Glucose 65 - 100 mg/dL 135(H) 110(H) 177(H) 75 - 127(H) 110(H)   BUN 6 - 20 MG/DL 36(H) 40(H) 56(H) 77(H) - 96(H) 101(H)   Creatinine 0.70 - 1.30 MG/DL 1.26 1.07 1.21 1.34(H) - 1.57(H) 1.65(H)   Sodium 136 - 145 mmol/L 138 137 133(L) 133(L) - 135(L) 134(L)   Potassium 3.5 - 5.1 mmol/L 4.8 4.4 4.3 4.2 - 4.4 4.4   TSH 0.36 - 3.74 uIU/mL - - - - - - -   PSA 0.01 - 4.0 ng/mL - - - - - - -   Some recent data might be hidden     Lab Results   Component Value Date/Time    TSH 2.12 12/27/2022 02:36 PM    TSH 4.80 (H) 12/06/2022 03:53 AM    TSH 5.39 (H) 10/12/2022 09:10 AM    TSH 3.53 02/03/2022 11:47 AM    TSH 5.790 (H) 11/21/2019 04:45 PM    TSH 3.08 06/22/2018 01:53 PM    TSH 4.250 05/26/2015 09:43 AM       ALLERGY:  No Known Allergies     CURRENT MEDICATIONS:    Current Facility-Administered Medications:     bumetanide (BUMEX) tablet 1 mg, 1 mg, Oral, DAILY, Lizette Linton NP    melatonin tablet 3 mg, 3 mg, Oral, QHS PRN, Modesta Goldbegr NP, 3 mg at 01/07/23 2355    [Held by provider] DOBUTamine (DOBUTREX) 500 mg/250 mL (2,000 mcg/mL) infusion, 1 mcg/kg/min, IntraVENous, CONTINUOUS, Jerry Dixon MD, Last Rate: 1.6 mL/hr at 01/06/23 1137, 1 mcg/kg/min at 01/06/23 1137    empagliflozin (JARDIANCE) tablet 10 mg, 10 mg, Oral, DAILY, Jerry Dixon MD, 10 mg at 01/07/23 0852    dextrose 10 % infusion 0-250 mL, 0-250 mL, IntraVENous, PRN, Cathy Sheldon CNS    insulin glargine (LANTUS) injection 8 Units, 8 Units, SubCUTAneous, QHS, Cathy Sheldon CNS, 8 Units at 01/07/23 2134    albuterol-ipratropium (DUO-NEB) 2.5 MG-0.5 MG/3 ML, 3 mL, Nebulization, Q6H PRN, Susanna Primrose, MD    insulin lispro (HUMALOG) injection, , SubCUTAneous, AC&HS, Cathy Sheldon CNS, 2 Units at 01/08/23 0754    digoxin (LANOXIN) tablet 0.125 mg, 0.125 mg, Oral, DAILY, Colleen Astorga NP, 0.125 mg at 01/07/23 8716    mirtazapine (REMERON) tablet 7.5 mg, 7.5 mg, Oral, QHS, Julio Salgadoi I, MD, 7.5 mg at 01/07/23 2134    clopidogreL (PLAVIX) tablet 75 mg, 75 mg, Oral, DAILY, Camden Mccormick NP, 75 mg at 01/07/23 8436    [Held by provider] potassium chloride SR (KLOR-CON 10) tablet 40 mEq, 40 mEq, Oral, BID, Sherie MOLINA MD, 40 mEq at 01/03/23 0838    pantoprazole (PROTONIX) tablet 40 mg, 40 mg, Oral, ACB, Ramana Edwards DO, 40 mg at 01/08/23 0754    heparin (porcine) injection 5,000 Units, 5,000 Units, SubCUTAneous, Q12H, Ramana Edwards DO, 5,000 Units at 01/07/23 2134    QUEtiapine (SEROquel) tablet 50 mg, 50 mg, Oral, QHS, Jenn Papillion, DO, 50 mg at 01/07/23 2134    lidocaine 4 % patch 1 Patch, 1 Patch, TransDERmal, Q24H, Yamilet MOLINA MD, 1 Patch at 01/05/23 1556    balsam peru-castor oiL (VENELEX) ointment, , Topical, BID, Keith Galvin MD, Given at 01/07/23 1841    alteplase (CATHFLO) 1 mg in sterile water (preservative free) 1 mL injection, 1 mg, InterCATHeter, PRN, Mynor Green MD    bacitracin 500 unit/gram packet 1 Packet, 1 Packet, Topical, PRN, Yamilet MOLINA MD    glucose chewable tablet 16 g, 4 Tablet, Oral, PRN, Yamilet MOLINA MD    glucagon (GLUCAGEN) injection 1 mg, 1 mg, IntraMUSCular, PRN, Mynor Green MD    senna-docusate (PERICOLACE) 8.6-50 mg per tablet 1 Tablet, 1 Tablet, Oral, BID PRN, Mynor Green MD, 1 Tablet at 12/26/22 7607    bisacodyL (DULCOLAX) suppository 10 mg, 10 mg, Rectal, QHS PRN, Yamilet MOLINA MD    sodium chloride (NS) flush 5-40 mL, 5-40 mL, IntraVENous, Q8H, Walker Aponte MD, 10 mL at 01/08/23 0754    sodium chloride (NS) flush 5-40 mL, 5-40 mL, IntraVENous, PRN, Mynor Green MD, 10 mL at 12/28/22 0845    acetaminophen (TYLENOL) tablet 650 mg, 650 mg, Oral, Q6H PRN, 650 mg at 01/05/23 2234 **OR** acetaminophen (TYLENOL) suppository 650 mg, 650 mg, Rectal, Q6H PRN, Walkre Moreno MD    polyethylene glycol (MIRALAX) packet 17 g, 17 g, Oral, DAILY PRN, Mynor Green MD, 17 g at 12/26/22 0649    ondansetron (ZOFRAN ODT) tablet 4 mg, 4 mg, Oral, Q8H PRN **OR** ondansetron (ZOFRAN) injection 4 mg, 4 mg, IntraVENous, Q6H PRN, Yamilet MOLINA MD, 4 mg at 12/24/22 0849    aspirin delayed-release tablet 81 mg, 81 mg, Oral, DAILY, Diane Lan MD, 81 mg at 01/07/23 0852    ipratropium (ATROVENT) 21 mcg (0.03 %) nasal spray 2 Spray, 2 Spray, Both Nostrils, Q12H, Michel, Nya Rushing MD, 2 Spray at 01/07/23 6093    tamsulosin (FLOMAX) capsule 0.4 mg, 0.4 mg, Oral, DAILY, Ho Smith MD, 0.4 mg at 01/07/23 0852    sodium chloride (NS) flush 5-40 mL, 5-40 mL, IntraVENous, PRN, Noman Foley MD    PATIENT CARE TEAM:  Patient Care Team:  Erin Broderick MD as PCP - General (Family Medicine)  Erin Broderick MD as PCP - [facility]  Jaime Eason MD (Cardiovascular Disease Physician)  Ivonne Tee MD (Gastroenterology)  Krishna Bell MD (Cardiothoracic Surgery)  Fausto Sofia MD (Cardiovascular Disease Physician)  Paola Fleming MD (Nephrology)  Darwin Gill RN as Care Transitions Nurse  Tala Rodgers MD (Pulmonary Disease)     Thank you for allowing me to participate in this patient's care.     Mary Vail NP   [address] [place] [address], [address]  Phone: [phone]

## 2023-01-08 NOTE — PROGRESS NOTES
Progress Note          Pt Name  Anna Webber   Date of Birth 1950   Medical Record Number  [id number]      Age  71 y.o. PCP Tigist Quintanilla MD   Admit date:  12/22/2022    Room Number  455/01  @ [facility], 1928–1983   Date of Service  1/8/2023     Admission Diagnoses:  Acute on chronic systolic congestive heart failure      Admission Summary:  \" Anna Webber is a 71 y.o. male who presents with progressively worsening shortness of breath for past several days. It has gotten worse to the point that he can only ambulate a few steps due to severe dyspnea and near syncope. Patient also noted abdominal distention which is increasing. Patient with known history of cardiomyopathy and recently admitted for CHF exacerbation a week ago. On presentation to the ER, chest x-ray shows diffuse airspace disease atypical infection versus edema. Also patient was noted to have elevated troponin and BNP. Patient was further evaluated by CT abdomen and pelvis which shows massively distended bladder with some bilateral hydronephrosis, subsequently Vasquez was placed. Patient also with significant leukocytosis as well as tachycardic and elevated lactic acid level and subsequently code sepsis was called. Hospitalist service requested admit the patient for further evaluation management. The patient denies any headache, blurry vision, sore throat, trouble swallowing, trouble with speech, chest pain, SOB, cough, fever, chills, N/V/D, abd pain, urinary symptoms, constipation, recent travels, sick contacts, focal or generalized neurological symptoms, falls, injuries, rashes, contact with COVID-19 diagnosed patients, hematemesis, melena, hemoptysis, hematuria, rashes, denies starting any new medications and denies any other concerns or problems besides as mentioned above.   \"     Assessment and plan:       Acute on chronic systolic congestive heart failure   Cardiogenic shock due to above - precluding comprehensive GDMT Rx for HF.   CAD   S/p CABG   Ischemic Cardiomyopathy     Appreciate help from Heart failure team.   Plan for RHC in two days from today. Dobutamine drip is being adjusted/held per HF team   Continue empagliflozin for assistance with HF   Continue Dig for HF assistance. TANNER on CKD stage III   Improving   Monitor daily weight , I/O     GERD -chronic   No change in current Rx. Depression  Hospital delirium  -  Continue Seroquel as is QHS - at 50mg   Continue Remeron as was PTA     T2DM -on Insulin   Controlled Romain diet   Insulin NPH Six units BID     BPH -chronic   Continue Tamsulosin     PAD   S/p Right SFA PTCA -followed by Dr. Susan Morfin     Body mass index is 17.39 kg/m². - severe protein romain malnutrition       Past Medical History:   Diagnosis Date    CAD (coronary artery disease) 11/10/2016    NSTEMI & 2 stents    Deafness 10/28/2012    DM (diabetes mellitus) ([facility])     Elevated cholesterol     Hypertension     NSTEMI (non-ST elevated myocardial infarction) ([facility] [address] 11/10/2016           CODE STATUS   Full    Functional Status  Pt is  and lives with his wife in [place]   Their daughter lives in [facility]     Surrogate decision maker:  Pt's wife and children      Prophylaxis   Lovenox   Discharge Plan:   PT, OT, RN,      Misc INPATIENT  Payor: [facility] MEDICARE / Plan: [address] [place] [address] MCR / Product Type: Managed Care Medicare /   There are currently no Active Isolations No active infections     Query   None noted today    Prognosis   Guarded    Social issues  1/7/23 2:02 PM I met with pt's children in the room, including his son who just arrived from [place]. We discussed medical issues in simple language. Then we discussed plan of care and possible discharge issues. We learned  that the pt's wife is unable to provide adequate care due to her ailments. They would very much appreciate help with DME, possible mobility devices for the pt to transport to second floor.        1/8/23  4:29 PM met with pt's wife in the room and I also spoke with pt's son over telephone from pt's wife's phone      Subjective Data     \"OK \"  Review of Systems - History obtained from child and the patient  General ROS: positive for  - fatigue  Respiratory ROS: no cough, shortness of breath, or wheezing  Cardiovascular ROS: no chest pain or dyspnea on exertion  Gastrointestinal ROS: no abdominal pain, change in bowel habits, or black or bloody stools    Objective Data       Comments  Thin built gentleman lying in bed in no distress   His wife is in the room      Patient Vitals for the past 24 hrs:   BP   01/08/23 1112 (!) 114/99   01/08/23 0823 (!) 110/44   01/08/23 0818 (!) 99/43   01/08/23 0813 (!) 90/32   01/08/23 0803 (!) 77/33   01/08/23 0703 (!) 94/32   01/08/23 0456 100/75   01/07/23 2355 (!) 128/57   01/07/23 2059 (!) 113/55   01/07/23 1505 (!) 138/56        Patient Vitals for the past 24 hrs:   Pulse   01/08/23 1400 95   01/08/23 1200 87   01/08/23 1112 89   01/08/23 1000 95   01/08/23 0823 83   01/08/23 0818 80   01/08/23 0813 83   01/08/23 0803 84   01/08/23 0703 95   01/08/23 0549 95   01/08/23 0456 96   01/08/23 0350 96   01/08/23 0147 (!) 102   01/07/23 2355 (!) 124   01/07/23 2147 (!) 114   01/07/23 2059 (!) 112   01/07/23 1800 99   01/07/23 1600 100   01/07/23 1505 (!) 114        Patient Vitals for the past 24 hrs:   Resp   01/08/23 1112 14   01/08/23 0823 18   01/08/23 0818 21   01/08/23 0813 24   01/08/23 0803 19   01/08/23 0703 20   01/08/23 0456 19   01/07/23 2355 23   01/07/23 2059 18   01/07/23 1505 20        Patient Vitals for the past 24 hrs:   Temp   01/08/23 1112 98 °F (36.7 °C)   01/08/23 0703 98.1 °F (36.7 °C)   01/08/23 0456 98.2 °F (36.8 °C)   01/07/23 2355 98.1 °F (36.7 °C)   01/07/23 2059 98.2 °F (36.8 °C)   01/07/23 1505 98.4 °F (36.9 °C)          SpO2 Readings from Last 6 Encounters:   01/08/23 96%   12/16/22 98%   12/08/22 97%   12/05/22 94%   11/16/22 99%   10/12/22 100%       O2 Flow Rate (L/min): 2 l/min  O2 Device: None (Room air) Body mass index is 17.39 kg/m². -  Wt Readings from Last 10 Encounters:   01/08/23 53.4 kg (117 lb 11.6 oz)   12/19/22 58.5 kg (129 lb)   12/16/22 57.4 kg (126 lb 8.7 oz)   12/05/22 59 kg (130 lb)   12/05/22 59.1 kg (130 lb 3.2 oz)   11/16/22 61.2 kg (135 lb)   10/27/22 60.8 kg (134 lb)   10/27/22 60.8 kg (134 lb)   10/12/22 60.8 kg (134 lb)   09/13/22 60.3 kg (133 lb)        Intake/Output Summary (Last 24 hours) at 1/8/2023 1424  Last data filed at 1/8/2023 3553  Gross per 24 hour   Intake 1170 ml   Output 2300 ml   Net -1130 ml           Physical Exam:             General:  Alert, cooperative,   MAL  noursished,   well developed,   appears stated age    Ears/Eyes:  Hearing intact  Sclera anicteric. Pupils equal   Mouth/Throat:  Mucous membranes normal pink and moist     Neck:     Lungs:  Chest excursion symmetrical   Auscultation B/L Symmetrical with   Vesicular breath sounds     No Crepitations noted          CVS:  Regular rate and rhythm   Systolic  murmur, MR and AS   No click, rub or gallop  S1 normal   S2 normal   Pedal pulses  b/l symmetrical   Abdomen:    Soft, non-tender  Bowel sounds normal     Extremities:  No cyanosis, jaundice  No edema noted   No sign of DVT/cord like lesion on palpation  .     Skin:  Skin color, texture, turgor normal. no acute rash or lesions    Lymph nodes:     Musculoskeletal Muscle bulk B/L symmetrical   Neuro Cranial nerves are intact,   motor movement b/l symmetrical,      Psych:  Alert and oriented,   normal mood & affect          Medications reviewed     Current Facility-Administered Medications   Medication Dose Route Frequency    bumetanide (BUMEX) tablet 1 mg  1 mg Oral DAILY    spironolactone (ALDACTONE) tablet 12.5 mg  12.5 mg Oral DAILY    melatonin tablet 3 mg  3 mg Oral QHS PRN    [Held by provider] DOBUTamine (DOBUTREX) 500 mg/250 mL (2,000 mcg/mL) infusion  1 mcg/kg/min IntraVENous CONTINUOUS    empagliflozin (rPomise Landeros) tablet 10 mg  10 mg Oral DAILY    dextrose 10 % infusion 0-250 mL  0-250 mL IntraVENous PRN    insulin glargine (LANTUS) injection 8 Units  8 Units SubCUTAneous QHS    albuterol-ipratropium (DUO-NEB) 2.5 MG-0.5 MG/3 ML  3 mL Nebulization Q6H PRN    insulin lispro (HUMALOG) injection   SubCUTAneous AC&HS    digoxin (LANOXIN) tablet 0.125 mg  0.125 mg Oral DAILY    mirtazapine (REMERON) tablet 7.5 mg  7.5 mg Oral QHS    clopidogreL (PLAVIX) tablet 75 mg  75 mg Oral DAILY    [Held by provider] potassium chloride SR (KLOR-CON 10) tablet 40 mEq  40 mEq Oral BID    pantoprazole (PROTONIX) tablet 40 mg  40 mg Oral ACB    heparin (porcine) injection 5,000 Units  5,000 Units SubCUTAneous Q12H    QUEtiapine (SEROquel) tablet 50 mg  50 mg Oral QHS    lidocaine 4 % patch 1 Patch  1 Patch TransDERmal Q24H    balsam peru-castor oiL (VENELEX) ointment   Topical BID    alteplase (CATHFLO) 1 mg in sterile water (preservative free) 1 mL injection  1 mg InterCATHeter PRN    bacitracin 500 unit/gram packet 1 Packet  1 Packet Topical PRN    glucose chewable tablet 16 g  4 Tablet Oral PRN    glucagon (GLUCAGEN) injection 1 mg  1 mg IntraMUSCular PRN    senna-docusate (PERICOLACE) 8.6-50 mg per tablet 1 Tablet  1 Tablet Oral BID PRN    bisacodyL (DULCOLAX) suppository 10 mg  10 mg Rectal QHS PRN    sodium chloride (NS) flush 5-40 mL  5-40 mL IntraVENous Q8H    sodium chloride (NS) flush 5-40 mL  5-40 mL IntraVENous PRN    acetaminophen (TYLENOL) tablet 650 mg  650 mg Oral Q6H PRN    Or    acetaminophen (TYLENOL) suppository 650 mg  650 mg Rectal Q6H PRN    polyethylene glycol (MIRALAX) packet 17 g  17 g Oral DAILY PRN    ondansetron (ZOFRAN ODT) tablet 4 mg  4 mg Oral Q8H PRN    Or    ondansetron (ZOFRAN) injection 4 mg  4 mg IntraVENous Q6H PRN    aspirin delayed-release tablet 81 mg  81 mg Oral DAILY    ipratropium (ATROVENT) 21 mcg (0.03 %) nasal spray 2 Spray  2 Spray Both Nostrils Q12H    tamsulosin (FLOMAX) capsule 0.4 mg  0.4 mg Oral DAILY    sodium chloride (NS) flush 5-40 mL  5-40 mL IntraVENous PRN       Relevant other informations: Other medical conditions listed in [facility] problem list section; all of these and other pertinent data were taken into consideration when treatment plan is developed and customized to this patient's unique overall circumstances and needs. We have reviewed available old medical records within the constraints of this admission process. Data Review:   Recent Days:  All Micro Results       Procedure Component Value Units Date/Time    URINE CULTURE HOLD SAMPLE [[id number]] Collected: 01/01/23 0913    Order Status: Completed Specimen: Urine Updated: 01/01/23 0916     Urine culture hold       Urine on hold in Microbiology dept for 2 days. If unpreserved urine is submitted, it cannot be used for addtional testing after 24 hours, recollection will be required. CULTURE, BLOOD, PAIRED [[id number]] Collected: 12/24/22 1754    Order Status: Completed Specimen: Blood Updated: 12/29/22 1952     Special Requests: NO SPECIAL REQUESTS        Culture result: NO GROWTH 5 DAYS       CULTURE, BLOOD, PAIRED [[id number]] Collected: 12/22/22 1339    Order Status: Completed Specimen: Blood Updated: 12/27/22 1555     Special Requests: NO SPECIAL REQUESTS        Culture result: NO GROWTH 5 DAYS       URINE CULTURE HOLD SAMPLE [[id number]] Collected: 12/24/22 1532    Order Status: Completed Specimen: Urine Updated: 12/24/22 1554     Urine culture hold       Urine on hold in Microbiology dept for 2 days. If unpreserved urine is submitted, it cannot be used for addtional testing after 24 hours, recollection will be required.           CULTURE, URINE [[id number]] Collected: 12/22/22 1423    Order Status: Completed Specimen: Cath Urine Updated: 12/23/22 1308     Special Requests: NO SPECIAL REQUESTS        Culture result: No growth (<1,000 CFU/ML)       RESPIRATORY VIRUS PANEL W/COVID-19, PCR [[id number]] Collected: 12/22/22 1532    Order Status: Completed Specimen: Nasopharyngeal Updated: 12/22/22 1704     Adenovirus Not detected        Coronavirus 229E Not detected        Coronavirus HKU1 Not detected        Coronavirus CVNL63 Not detected        Coronavirus OC43 Not detected        SARS-CoV-2, PCR Not detected        Metapneumovirus Not detected        Rhinovirus and Enterovirus Not detected        Influenza A Not detected        Influenza B Not detected        Parainfluenza 1 Not detected        Parainfluenza 2 Not detected        Parainfluenza 3 Not detected        Parainfluenza virus 4 Not detected        RSV by PCR Not detected        B. parapertussis, PCR Not detected        Bordetella pertussis - PCR Not detected        Chlamydophila pneumoniae DNA, QL, PCR Not detected        Mycoplasma pneumoniae DNA, QL, PCR Not detected                Recent Labs     01/07/23  0536 01/06/23  0435   WBC 7.7 7.9   HGB 10.1* 9.0*   HCT 35.7* 31.3*    247       Recent Labs     01/08/23  0616 01/07/23  0536 01/06/23  0435    137 133*   K 4.8 4.4 4.3    107 102   CO2 24 26 25   * 110* 177*   BUN 36* 40* 56*   CREA 1.26 1.07 1.21   CA 9.1 9.1 9.4   MG 2.2 2.2 2.3   PHOS 2.7 2.1* 2.3*   ALB 3.0* 3.1* 3.0*   TBILI 0.4 0.4 0.4   * 95* 116*        Lab Results   Component Value Date/Time    TSH 2.12 12/27/2022 02:36 PM            Care Plan discussed with:Patient/Family, Nurse, and    Other medical conditions are listed in the active hospital problem list section; these and other pertinent data were taken into consideration when the treatment plan was developed and customized to this patient's unique overall circumstances and needs. High complexity decision making was performed for this patient who is at high risk for decompensation with multiple organ involvement.    Today total floor/unit time was 30 minutes while caring for this patient and greater than 50% of that time was spent with patient (and/or family) coordinating patients clinical issues; this includes time spent during multidisciplinary rounds.         Norma Peralta MD MPH FACP [facility]  1/8/2023

## 2023-01-08 NOTE — PROGRESS NOTES
[facility] CARDIOLOGY   Care Note                  []Initial visit     [x]Established visit     Patient Name: Nikki Rojas - KBB:[birth year] - P:[id number]  Primary Cardiologist: Bekah Daly MD  Consulting Cardiologist: Kieran Bettencourt md     Reason for initial visit:  dyspnea, decompensated HF, tachycardia. SUBJECTIVE:    Off dobutamine. Started on Digoxin. Doing well with stable BP. No significant dyspnea. Adequate negative I/O   Assessment and Plan       1. Dyspnea/decompensated HFrEF- EF 20%-hepatic and renal function down to baseline indicating adequate tissue perfusion. Cardiogenic shock improving. Off dobutamine since yesterday. Plan to perform RHC tomorrow to assess cardiac output and filling pressures along with echo. Long-term prognosis still guarded and may end up requiring permanent/long-term LAD support if family interested. Heart failure team to manage. Discussed importance of continued activity to avoid muscle mass loss. 2  CAD - sp CABG in 2018, cath 9/8/2021 open LIMA, PCI to OM 2 all the way up to the left main 2/28/2022. Cath 12/6/2022 open LIMA and RCA graft some in-stent restenosis in the long graft from the left main to OM 2 but not severe and the OM1 was more severely diseased in the small vessel. Interventional cardiology felt no reasonable vessel to intervene and recommended medical management. The OM disease is consistent with a lateral wall ischemia demonstrated on prior nuclear stress test.  Continue aspirin, plavix. Holding statin for elevated LFTs. Continue to monitor CBC for anemia. Hgb up to 9.7 currently. 3.  Aortic stenosis mild mean gradient of 7 nn Hg, HUY 1.4 by echo 11/27/2022 -mild , murmur     4. Diabetes mellitus type 2- On insulin, management per IM     5. PAD: S/P Right SFA PTCA 12/6/22. Continue ASA, plavix.   Statins can be reinitiated as needed. 6. CKD 2: creatinine normalizing    7. DNR status in place     I discussed the risks/benefits/alternatives of the procedure with the patient. Risks include (but are not limited to) bleeding, infection,stroke,heart attack, need for emergency surgery/pericardiocentesis, need for dialysis or death. The patient understands and agrees to proceed. HPI:     CAD with CABG 6/9/2018. NSTEMI in November 2016 and resolved pulmonary HTN. Stent to circumflex an LAD in 2016. Stress echo in 2018 with a drop in BP with exercise and a drop in EF. Cath on 6/22/18 that showed even with a 5F catheter, he dampened with suspected ostial 3 vessel disease. Echo 3/27/2021 = EF 55 to 60% mild AR MR TR LAE MAC  Nuclear 3/18/2021 EF 64% medium size defect apical and inferior lateral reversible ischemia medium defect mid and inferolateral nonreversible appear to be infarction intermediate risk stress test .  PCI 02/28/2022--patent LIMA to LAD, vein graft to distal RCA with severe disease in the native vessels Occluded vein graft to OM 2. Native OM 2 severely diseased along with proximal to mid circumflex as well as distal left main. Overall the vessel is significantly remodeled negatively. Additionally there is significant disease in the first marginal branch which is even smaller as well as AV groove branch right at the takeoff of OM2. Single stent 2.25 x 28 mm Xience was placed extending from the OM 2 into the circumflex into the distal left main and sequentially dilated with 2.25 noncompliant, 2 5 noncompliant, 3 oh noncompliant and 3 5 noncompliant to perform multilevel POT to manage multiple bifurcations on the way. Atherectomy was considered but given small size of the vessels, was not deemed to be absolutely safe for the obtuse marginal lesion. Overall stent expanded fairly well related to the size of the vessels.  Normal LVEDP  Nuclear stress October 27, 2022 test showed ischemia similar to 3/15/2021 with a medium size defect in the mid to distal inferolateral and anterolateral segments in the circumflex territory he had septal dyskinesia with an EF of 39%. He had a fixed apical infarct  Echocardiogram October 27, 2022 showed an EF of 45 to 50% TAPSE at 1.2 showing reduced RV function sclerosis of the aortic valve with stenosis that was very mild with a valve area of mean of 7 mmHg and HUY of 1.4 cm². The mitral was thickened with restricted mobility and mild MR.  ____________________________________________________________    Cardiac testing  12/22/22    ECHO ADULT COMPLETE 12/26/2022 12/26/2022    Interpretation Summary    Left Ventricle: Severely reduced left ventricular systolic function with a visually estimated EF of 25 - 30%. Left ventricle size is normal. Normal wall thickness. There are regional wall motion abnormalities. Grade II diastolic dysfunction with increased LAP. Right Ventricle: Moderately reduced systolic function. TAPSE is abnormal. TAPSE is 1.1 cm. Aortic Valve: Mild stenosis of the aortic valve. AV peak gradient is 13 mmHg. AV peak velocity is 1.8 m/s. Mitral Valve: Not well visualized. Moderate annular calcification at the posterior leaflet of the mitral valve. Mild to moderate regurgitation. Tricuspid Valve: Mildly elevated RVSP. Left Atrium: Left atrium is moderately dilated. Signed by: Thom Lassiter MD on 12/26/2022  3:13 PM          10/27/22    NUCLEAR CARDIAC STRESS TEST 10/27/2022 11/1/2022    Interpretation Summary    Nuclear Findings: The study is positive for myocardial ischemia. The defect appears to be ischemia. The areas of ischemia are similar to 3/15/2021 study. Nuclear Findings: There is a moderate severity left ventricular stress perfusion defect that is medium in size present in the mid to distal inferolateral and anterolateral segment(s) that is reversible. The defect is consistent with abnormal perfusion in the LCx territory. There is normal wall motion in the defect area. The defect appears to be probable ischemia. There is a moderate severity second left ventricular stress perfusion defect. The defect appears to be infarction. Nuclear Findings: Abnormal left ventricular systolic function post-stress. Septal dyskinesis. Post-stress ejection fraction is 39%. ECG: Resting ECG demonstrates normal sinus rhythm. ECG: Stress ECG was negative for ischemia. Stress Test: A pharmacological stress test was performed using lexiscan. Blood pressure demonstrated a normal response and heart rate demonstrated a normal response to stress. The patient's heart rate recovery was normal. The patient reported no symptoms during the stress test.    Signed by: Terrence Patiño MD on 10/27/2022  4:45 PM    02/28/22    CARDIAC PROCEDURE 02/28/2022 2/28/2022    Conclusion  Findings  1. Severe native multivessel coronary disease  2. Patent LIMA to LAD, vein graft to distal RCA with severe disease in the native vessels  3. Occluded vein graft to OM 2. Native OM 2 severely diseased along with proximal to mid circumflex as well as distal left main. Overall the vessel is significantly remodeled negatively. Additionally there is significant disease in the first marginal branch which is even smaller as well as AV groove branch right at the takeoff of OM2. Single stent 2.25 x 28 mm Xience was placed extending from the OM 2 into the circumflex into the distal left main and sequentially dilated with 2.25 noncompliant, 2 5 noncompliant, 3 oh noncompliant and 3 5 noncompliant to perform multilevel POT to manage multiple bifurcations on the way. Atherectomy was considered but given small size of the vessels, was not deemed to be absolutely safe for the obtuse marginal lesion. Overall stent expanded fairly well related to the size of the vessels.   4.  Normal LVEDP    Access right radial: Small vessel, tortuous} brachiocephalic  Contrast 65 cc    Recommendations  1. Plavix based dual antiplatelet therapy  2. Guideline directed medical therapy for secondary prevention of coronary events    Signed by: Jenny Barron MD on 2/28/2022  4:22 PM    Most recent HS troponins:  No results for input(s): TROPHS in the last 72 hours. No lab exists for component:  CKMB        Review of Systems    [x]All other systems reviewed and all negative except as written in HPI    [] Patient unable to provide secondary to condition         Past Medical History:   Diagnosis Date    CAD (coronary artery disease) 11/10/2016    NSTEMI & 2 stents    Deafness 10/28/2012    DM (diabetes mellitus) ([facility] [address]     Elevated cholesterol     Hypertension     NSTEMI (non-ST elevated myocardial infarction) ([facility] [address] 11/10/2016     Past Surgical History:   Procedure Laterality Date    COLONOSCOPY N/A 6/28/2018    COLONOSCOPY performed by Julito Thompson MD at [address]  11/11/2016    2 stents     Social Hx:  reports that he has never smoked. He has never been exposed to tobacco smoke. He has never used smokeless tobacco. He reports current alcohol use of about 2.0 standard drinks per week. He reports that he does not use drugs. Family Hx: family history includes Elevated Lipids in his brother, brother, and brother; Heart Attack in his father; Heart Disease in his father; Hypertension in his mother; No Known Problems in his daughter, sister, and son.   No Known Allergies       OBJECTIVE:  Wt Readings from Last 3 Encounters:   01/07/23 114 lb 13.8 oz (52.1 kg)   12/19/22 129 lb (58.5 kg)   12/16/22 126 lb 8.7 oz (57.4 kg)       Intake/Output Summary (Last 24 hours) at 1/8/2023 6106  Last data filed at 1/7/2023 2355  Gross per 24 hour   Intake 600 ml   Output 1350 ml   Net -750 ml             Physical Exam    Vitals:   Vitals:    01/07/23 2355 01/08/23 0147 01/08/23 0350 01/08/23 0549   BP: (!) 128/57      Pulse: (!) 124 (!) 102 96 95   Resp: 23      Temp: 98.1 °F (36.7 °C)      SpO2: 95%      Weight:       Height:         Telemetry: normal sinus rhythm  BP (!) 128/57   Pulse 95   Temp 98.1 °F (36.7 °C)   Resp 23   Ht 5' 9\" (1.753 m)   Wt 114 lb 13.8 oz (52.1 kg)   SpO2 95%   BMI 16.96 kg/m²   General:    Alert, cooperative, no distress. Psychiatric:    Normal Mood and affect    Eye/ENT:      Pupils equal, No asymmetry, Conjunctival pink. Able to hear voice at normal amplitude   Lungs:      Visibly symmetric chest expansion, No palpable tenderness. Clear to auscultation bilaterally. Heart[de-identified]    Regular rate and rhythm, S1, S2 normal, no murmur, click, rub or gallop. No JVD, Normal palpable peripheral pulses. No cyanosis   Abdomen:     Soft, non-tender. Bowel sounds normal. No masses,  No      organomegaly. Extremities:   Extremities normal, atraumatic, no edema. Neurologic:   CN II-XII grossly intact. No gross focal deficits           Data Review:     Radiology:   XR Results (most recent):  Results from Hospital Encounter encounter on 12/22/22    XR CHEST PORT    Narrative  INDICATION:  Rule out PNA    EXAM: Chest single view. COMPARISON: 12/31/2022. FINDINGS: A single frontal view of the chest at 0953 hours shows stable  bibasilar atelectasis and interstitial prominence. No new focal infiltrate. Small right pleural effusion stable. .  The heart, mediastinum and pulmonary  vasculature are stable status post median sternotomy . The bony thorax is  unremarkable for age. .    Impression  Stable bibasilar atelectasis and interstitial prominence with small right  pleural effusion. .  . CT Results (most recent):  Results from Hospital Encounter encounter on 12/22/22    CT ABD PELV WO CONT    Narrative  EXAM: CT ABD PELV WO CONT    INDICATION: rlq abdominal pain    COMPARISON: 5/3/2014    IV CONTRAST: None. ORAL CONTRAST: None    TECHNIQUE:  Thin axial images were obtained through the abdomen and pelvis.  Coronal and  sagittal reformats were generated. CT dose reduction was achieved through use of  a standardized protocol tailored for this examination and automatic exposure  control for dose modulation. The absence of intravenous contrast material reduces the sensitivity for  evaluation of the vasculature and solid organs. FINDINGS:  LOWER THORAX: Small bilateral pleural effusions. Partial collapse bases  LIVER: No mass. BILIARY TREE: Gallstones. No evidence of acute cholecystitis CBD is not dilated. SPLEEN: within normal limits. PANCREAS: No focal abnormality. ADRENALS: 19 mm right adrenal nodule unchanged,  KIDNEYS/URETERS: Mild bilateral hydronephrosis. No stone  STOMACH: Unremarkable. SMALL BOWEL: No dilatation or wall thickening. COLON: No dilatation or wall thickening. APPENDIX: Surgically absent  PERITONEUM: No ascites or pneumoperitoneum. RETROPERITONEUM: No lymphadenopathy or aortic aneurysm. Extensive vascular  calcifications  REPRODUCTIVE ORGANS: Mildly enlarged  URINARY BLADDER: Massive distention  BONES: No destructive bone lesion. ABDOMINAL WALL: Small umbilical hernia containing fat. ADDITIONAL COMMENTS: N/A    Impression  1. Bladder is massively distended with mild bilateral hydronephrosis. May  indicate bladder outlet obstruction. Prostate is mildly enlarged  2. Gallstones. No acute cholecystitis  3. Small bilateral pleural effusions    MRI Results (most recent):  Results from Hospital Encounter encounter on 12/22/22    MRI CARDIAC MORPH FUNC W WO CONT    Narrative  CARDIOVASCULAR MRI    INDICATION: suspected myocarditis. Dr. Gilles Rodriguez aware of need for MRI and planned  for Tuesday at [facility]:    CPT Codes: 47264    SCANS PERFORMED:  Spin Echo: Axial.  FIESTA/SSFP  LGE    Contrast Agent: ProHance. Contrast Dose: 15ml. CLINICAL DATA:  HR = 103/min, BP = 81/61mmHg,  HT = 5 foot 9inc, WT = 108lb. Impression  1. Normal left ventricular cavity size.  Severe left ventricular systolic  dysfunction. Severe hypokinesis of the base to mid and distal anterior wall,  anteroseptal wall, anteroapical wall, apical septal wall and apex. Mild  hypokinesis of the lateral wall. 3-D LVEF 23%. 2. Normal right ventricular size and systolic function. 3. Sclerotic aortic valve leaflets. Mild to moderate aortic valve stenosis. Aortic valve area is 1.3 sq cm by planimetry. 4. On T2 W imaging there is no significant myocardial edema seen. On EGE and LGE  study, there is patchy subendocardial infarct involving LAD territory including  mid to distal anterior wall, anteroseptal wall, anteroapical wall, apex, apical  septal wall involving less than 25% thickness of the myocardial wall with  thinning of the apex and apical septal wall. There is medium grade myocardial  viability of these walls. There is a small to medium size subendocardial infarct  of the basal inferior wall. There is mild patchy subepicardial enhancement of  the lateral wall suggestive of nonischemic etiology. There is no features of  infiltrative sarcoidosis or cardiac amyloidosis. 5. Normal pleura and pericardium. There is no significant effusions. Pericardium:  Normal. (<3.5mm)  Pericardial Effusion:  None. Pleural Effusion:  None. VOLUMETRIC DATA:    LVEDVI:  71 ml/m2 (Normal 47-92 mL/sq-m)  LVESVI:  55 ml/m2 (Normal 13-30 mL/sq-m)  LVSVI:  17 ml/m2 (Normal 32-62 mL/sq-m)  LVMI:  59 g/m2      No results for input(s): CPK, TROIQ in the last 72 hours. No lab exists for component: CKQMB, CPKMB, BMPP    Recent Labs     01/07/23  0536 01/06/23  0435    133*   K 4.4 4.3    102   CO2 26 25   BUN 40* 56*   CREA 1.07 1.21   * 177*   PHOS 2.1* 2.3*   CA 9.1 9.4       Recent Labs     01/07/23  0536 01/06/23  0435   WBC 7.7 7.9   HGB 10.1* 9.0*   HCT 35.7* 31.3*    247       Recent Labs     01/07/23  0536 01/06/23  0435   * 179*         No results for input(s): CHOL, LDLC in the last 72 hours.     No lab exists for component: TGL, HDLC,  HBA1C  No results for input(s): CRP, TSH, TSHEXT, TSHEXT in the last 72 hours.     No lab exists for component: ESR      Current meds:    Current Facility-Administered Medications:     melatonin tablet 3 mg, 3 mg, Oral, QHS PRN, Modesta Goldberg NP, 3 mg at 01/07/23 2355    [Held by provider] DOBUTamine (DOBUTREX) 500 mg/250 mL (2,000 mcg/mL) infusion, 1 mcg/kg/min, IntraVENous, CONTINUOUS, Herb Mcintyre MD, Last Rate: 1.6 mL/hr at 01/06/23 1137, 1 mcg/kg/min at 01/06/23 1137    empagliflozin (JARDIANCE) tablet 10 mg, 10 mg, Oral, DAILY, Herb Mcintyre MD, 10 mg at 01/07/23 0852    dextrose 10 % infusion 0-250 mL, 0-250 mL, IntraVENous, PRN, Cathy Sheldon CNS    insulin glargine (LANTUS) injection 8 Units, 8 Units, SubCUTAneous, QHS, Cathy Sheldon CNS, 8 Units at 01/07/23 2134    albuterol-ipratropium (DUO-NEB) 2.5 MG-0.5 MG/3 ML, 3 mL, Nebulization, Q6H PRN, Gabrielle Nuñez MD    insulin lispro (HUMALOG) injection, , SubCUTAneous, AC&HS, Cathy Sheldon CNS, 3 Units at 01/07/23 2134    digoxin (LANOXIN) tablet 0.125 mg, 0.125 mg, Oral, DAILY, Colleen Astorga NP, 0.125 mg at 01/07/23 0095    mirtazapine (REMERON) tablet 7.5 mg, 7.5 mg, Oral, QHS, Roya Julio MD, 7.5 mg at 01/07/23 2134    clopidogreL (PLAVIX) tablet 75 mg, 75 mg, Oral, DAILY, Stewart Mccormick NP, 75 mg at 01/07/23 0852    [Held by provider] bumetanide (BUMEX) injection 2 mg, 2 mg, IntraVENous, Q12H, Desire Dodd MD, 2 mg at 01/03/23 0837    [Held by provider] potassium chloride SR (KLOR-CON 10) tablet 40 mEq, 40 mEq, Oral, BID, Allen MOLINA MD, 40 mEq at 01/03/23 0838    pantoprazole (PROTONIX) tablet 40 mg, 40 mg, Oral, ACB, Natalie Broussard DO, 40 mg at 01/07/23 0736    heparin (porcine) injection 5,000 Units, 5,000 Units, SubCUTAneous, Q12H, Natalie Broussard DO, 5,000 Units at 01/07/23 2134    QUEtiapine (SEROquel) tablet 50 mg, 50 mg, Oral, QHS, Laura Bartlett, Wesly Ann, DO, 50 mg at 01/07/23 2134    lidocaine 4 % patch 1 Patch, 1 Patch, TransDERmal, Q24H, Quan MOLINA MD, 1 Patch at 01/05/23 1556    balsam peru-castor oiL (VENELEX) ointment, , Topical, BID, Froy Cosme MD, Given at 01/07/23 1841    alteplase (CATHFLO) 1 mg in sterile water (preservative free) 1 mL injection, 1 mg, InterCATHeter, PRN, Sam Lopez MD    bacitracin 500 unit/gram packet 1 Packet, 1 Packet, Topical, PRN, Quan MOLINA MD    glucose chewable tablet 16 g, 4 Tablet, Oral, PRN, Quan MOLINA MD    glucagon (GLUCAGEN) injection 1 mg, 1 mg, IntraMUSCular, PRN, Sam Lopez MD    senna-docusate (PERICOLACE) 8.6-50 mg per tablet 1 Tablet, 1 Tablet, Oral, BID PRN, Sam Lopez MD, 1 Tablet at 12/26/22 8809    bisacodyL (DULCOLAX) suppository 10 mg, 10 mg, Rectal, QHS PRN, Quan MOLINA MD    sodium chloride (NS) flush 5-40 mL, 5-40 mL, IntraVENous, Q8H, Walker Aponte MD, 10 mL at 01/07/23 2136    sodium chloride (NS) flush 5-40 mL, 5-40 mL, IntraVENous, PRN, Sam Lopez MD, 10 mL at 12/28/22 0845    acetaminophen (TYLENOL) tablet 650 mg, 650 mg, Oral, Q6H PRN, 650 mg at 01/05/23 2234 **OR** acetaminophen (TYLENOL) suppository 650 mg, 650 mg, Rectal, Q6H PRN, Walker Hawk MD    polyethylene glycol (MIRALAX) packet 17 g, 17 g, Oral, DAILY PRN, Sam Lopez MD, 17 g at 12/26/22 0649    ondansetron (ZOFRAN ODT) tablet 4 mg, 4 mg, Oral, Q8H PRN **OR** ondansetron (ZOFRAN) injection 4 mg, 4 mg, IntraVENous, Q6H PRN, Quan MOLINA MD, 4 mg at 12/24/22 0849    aspirin delayed-release tablet 81 mg, 81 mg, Oral, DAILY, Arnold Noriega MD, 81 mg at 01/07/23 0852    ipratropium (ATROVENT) 21 mcg (0.03 %) nasal spray 2 Spray, 2 Spray, Both Nostrils, Q12H, Little Pearson MD, 2 Reva at 01/07/23 3574    tamsulosin (FLOMAX) capsule 0.4 mg, 0.4 mg, Oral, DAILY, Little Pearson MD, 0.4 mg at 01/07/23 0847    sodium chloride (NS) flush 5-40 mL, 5-40 mL, IntraVENous, PRN, MD Travis Krause MD  [facility] [address], [address] [address]  [facility], [facility]  [phone]      Raya Parikh MD

## 2023-01-08 NOTE — PROGRESS NOTES
0730: Bedside and Verbal shift change report given to travon kiran (oncoming nurse) by travon brenner  (offgoing nurse). Report included the following information SBAR, Kardex, Intake/Output, MAR, Recent Results, and Quality Measures. [facility] Hemodynamic Monitoring     Visit Vitals  BP (!) 110/44   Pulse 95   Temp 98.1 °F (36.7 °C)   Resp 18   Ht 5' 9\" (1.753 m)   Wt 53.4 kg (117 lb 11.6 oz)   SpO2 97%   BMI 17.39 kg/m²         Baseline assessment:        CO 2.4        CI 1.5        SVI 18        SVV 21    orthostatics:     1435:Layin/43,   1440: sittin/42   1445: standin/46        193: Bedside and Verbal shift change report given to travon Thomas (oncoming nurse) by Cathy Garcia (offgoing nurse). Report included the following information SBAR, Kardex, Intake/Output, MAR, Recent Results, and Quality Measures. : pt family member called to let us know pt is feeling SOB. Went to check on pt, satting 98% on room air, placed on [facility] for comfort. Amador Richard NP notified. Wife updated on pt status.       Problem: Patient Education: Go to Patient Education Activity  Goal: Patient/Family Education  Outcome: Progressing Towards Goal     Problem: Heart Failure: Discharge Outcomes  Goal: *Understands and describes signs and symptoms to report to providers(Stroke Metric)  Outcome: Progressing Towards Goal  Goal: *Describes/verbalizes understanding of follow-up/return appt  Description: (eg: to physicians, diabetes treatment coordinator, and other resources  Outcome: Progressing Towards Goal

## 2023-01-08 NOTE — PROGRESS NOTES
[facility] CARDIOLOGY   Care Note                  []Initial visit     [x]Established visit     Patient Name: Jim Warren - MMW:[birth year] - [facility]:[id number]  Primary Cardiologist: Lynda Allred MD  Consulting Cardiologist: Pool Santana md     Reason for initial visit:  dyspnea, decompensated HF, tachycardia. SUBJECTIVE:    Remains on dobutamine drip now at 2 mics per KG per minute. Overall appears euvolemic. Blood pressures were previously initial.  Tolerating exercise well with better stamina. Assessment and Plan       1. Dyspnea/decompensated HFrEF- EF 20%-hepatic and renal function down to baseline indicating adequate tissue perfusion. Cardiogenic shock improving. May consider slowly down titrating dobutamine but I doubt he will be able to come off dobutamine completely in a short period of time. May require home dobutamine therapy for a few weeks. Long-term prognosis still guarded and may end up requiring permanent/long-term LAD support if family interested. Heart failure team to manage. Discussed importance of continued activity to avoid muscle mass loss. Continue physical therapy. Plan for [address] on Monday hopefully after weaning of Dobutamine if tolerating over the weekend    2  CAD - sp CABG in 2018, cath 9/8/2021 open LIMA, PCI to OM 2 all the way up to the left main 2/28/2022. Cath 12/6/2022 open LIMA and RCA graft some in-stent restenosis in the long graft from the left main to OM 2 but not severe and the OM1 was more severely diseased in the small vessel. Interventional cardiology felt no reasonable vessel to intervene and recommended medical management. The OM disease is consistent with a lateral wall ischemia demonstrated on prior nuclear stress test.  Continue aspirin, plavix. Holding statin for elevated LFTs. Continue to monitor CBC for anemia.   Hgb up to 9.7 currently. Microcytic indices. 3.  Aortic stenosis mild mean gradient of 7 nn Hg, HUY 1.4 by echo 11/27/2022 -mild , murmur     4. Diabetes mellitus type 2- On insulin, management per IM     5. PAD: S/P Right SFA PTCA 12/6/22. Continue ASA, plavix. Statins can be reinitiated as needed. 6. 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 DNR status in place        HPI:     CAD with CABG 6/9/2018. NSTEMI in November 2016 and resolved pulmonary HTN. Stent to circumflex an LAD in 2016. Stress echo in 2018 with a drop in BP with exercise and a drop in EF. Cath on 6/22/18 that showed even with a 5F catheter, he dampened with suspected ostial 3 vessel disease. Echo 3/27/2021 = EF 55 to 60% mild AR MR TR LAE MAC  Nuclear 3/18/2021 EF 64% medium size defect apical and inferior lateral reversible ischemia medium defect mid and inferolateral nonreversible appear to be infarction intermediate risk stress test .  PCI 02/28/2022--patent LIMA to LAD, vein graft to distal RCA with severe disease in the native vessels Occluded vein graft to OM 2. Native OM 2 severely diseased along with proximal to mid circumflex as well as distal left main. Overall the vessel is significantly remodeled negatively. Additionally there is significant disease in the first marginal branch which is even smaller as well as AV groove branch right at the takeoff of OM2. Single stent 2.25 x 28 mm Xience was placed extending from the OM 2 into the circumflex into the distal left main and sequentially dilated with 2.25 noncompliant, 2 5 noncompliant, 3 oh noncompliant and 3 5 noncompliant to perform multilevel POT to manage multiple bifurcations on the way. Atherectomy was considered but given small size of the vessels, was not deemed to be absolutely safe for the obtuse marginal lesion. Overall stent expanded fairly well related to the size of the vessels.  Normal LVEDP  Nuclear stress October 27, 2022 test showed ischemia similar to 3/15/2021 with a medium size defect in the mid to distal inferolateral and anterolateral segments in the circumflex territory he had septal dyskinesia with an EF of 39%. He had a fixed apical infarct  Echocardiogram October 27, 2022 showed an EF of 45 to 50% TAPSE at 1.2 showing reduced RV function sclerosis of the aortic valve with stenosis that was very mild with a valve area of mean of 7 mmHg and HUY of 1.4 cm². The mitral was thickened with restricted mobility and mild MR.  ____________________________________________________________    Cardiac testing  12/22/22    ECHO ADULT COMPLETE 12/26/2022 12/26/2022    Interpretation Summary    Left Ventricle: Severely reduced left ventricular systolic function with a visually estimated EF of 25 - 30%. Left ventricle size is normal. Normal wall thickness. There are regional wall motion abnormalities. Grade II diastolic dysfunction with increased LAP. Right Ventricle: Moderately reduced systolic function. TAPSE is abnormal. TAPSE is 1.1 cm. Aortic Valve: Mild stenosis of the aortic valve. AV peak gradient is 13 mmHg. AV peak velocity is 1.8 m/s. Mitral Valve: Not well visualized. Moderate annular calcification at the posterior leaflet of the mitral valve. Mild to moderate regurgitation. Tricuspid Valve: Mildly elevated RVSP. Left Atrium: Left atrium is moderately dilated. Signed by: Ezra Mendoza MD on 12/26/2022  3:13 PM          10/27/22    NUCLEAR CARDIAC STRESS TEST 10/27/2022 11/1/2022    Interpretation Summary    Nuclear Findings: The study is positive for myocardial ischemia. The defect appears to be ischemia. The areas of ischemia are similar to 3/15/2021 study. Nuclear Findings: There is a moderate severity left ventricular stress perfusion defect that is medium in size present in the mid to distal inferolateral and anterolateral segment(s) that is reversible. The defect is consistent with abnormal perfusion in the LCx territory. There is normal wall motion in the defect area. The defect appears to be probable ischemia. There is a moderate severity second left ventricular stress perfusion defect. The defect appears to be infarction. Nuclear Findings: Abnormal left ventricular systolic function post-stress. Septal dyskinesis. Post-stress ejection fraction is 39%. ECG: Resting ECG demonstrates normal sinus rhythm. ECG: Stress ECG was negative for ischemia. Stress Test: A pharmacological stress test was performed using lexiscan. Blood pressure demonstrated a normal response and heart rate demonstrated a normal response to stress. The patient's heart rate recovery was normal. The patient reported no symptoms during the stress test.    Signed by: Ramu Giron MD on 10/27/2022  4:45 PM    02/28/22    CARDIAC PROCEDURE 02/28/2022 2/28/2022    Conclusion  Findings  1. Severe native multivessel coronary disease  2. Patent LIMA to LAD, vein graft to distal RCA with severe disease in the native vessels  3. Occluded vein graft to OM 2. Native OM 2 severely diseased along with proximal to mid circumflex as well as distal left main. Overall the vessel is significantly remodeled negatively. Additionally there is significant disease in the first marginal branch which is even smaller as well as AV groove branch right at the takeoff of OM2. Single stent 2.25 x 28 mm Xience was placed extending from the OM 2 into the circumflex into the distal left main and sequentially dilated with 2.25 noncompliant, 2 5 noncompliant, 3 oh noncompliant and 3 5 noncompliant to perform multilevel POT to manage multiple bifurcations on the way. Atherectomy was considered but given small size of the vessels, was not deemed to be absolutely safe for the obtuse marginal lesion. Overall stent expanded fairly well related to the size of the vessels.   4.  Normal LVEDP    Access right radial: Small vessel, tortuous} brachiocephalic  Contrast 65 cc    Recommendations  1. Plavix based dual antiplatelet therapy  2. Guideline directed medical therapy for secondary prevention of coronary events    Signed by: Travis Rockwell MD on 2/28/2022  4:22 PM    Most recent HS troponins:  No results for input(s): TROPHS in the last 72 hours. No lab exists for component:  CKMB        Review of Systems    [x]All other systems reviewed and all negative except as written in HPI    [] Patient unable to provide secondary to condition         Past Medical History:   Diagnosis Date    CAD (coronary artery disease) 11/10/2016    NSTEMI & 2 stents    Deafness 10/28/2012    DM (diabetes mellitus) ([facility] [address]     Elevated cholesterol     Hypertension     NSTEMI (non-ST elevated myocardial infarction) ([facility] [address] 11/10/2016     Past Surgical History:   Procedure Laterality Date    COLONOSCOPY N/A 6/28/2018    COLONOSCOPY performed by Gui Thompson MD at [address]  11/11/2016    2 stents     Social Hx:  reports that he has never smoked. He has never been exposed to tobacco smoke. He has never used smokeless tobacco. He reports current alcohol use of about 2.0 standard drinks per week. He reports that he does not use drugs. Family Hx: family history includes Elevated Lipids in his brother, brother, and brother; Heart Attack in his father; Heart Disease in his father; Hypertension in his mother; No Known Problems in his daughter, sister, and son.   No Known Allergies       OBJECTIVE:  Wt Readings from Last 3 Encounters:   01/07/23 114 lb 13.8 oz (52.1 kg)   12/19/22 129 lb (58.5 kg)   12/16/22 126 lb 8.7 oz (57.4 kg)       Intake/Output Summary (Last 24 hours) at 1/8/2023 0605  Last data filed at 1/7/2023 2355  Gross per 24 hour   Intake 600 ml   Output 1350 ml   Net -750 ml             Physical Exam    Vitals:   Vitals:    01/07/23 2355 01/08/23 0147 01/08/23 0350 01/08/23 0549   BP: (!) 128/57      Pulse: (!) 124 (!) 102 96 95   Resp: 23      Temp: 98.1 °F (36.7 °C)      SpO2: 95%      Weight:       Height:         Telemetry: normal sinus rhythm  BP (!) 128/57   Pulse 95   Temp 98.1 °F (36.7 °C)   Resp 23   Ht 5' 9\" (1.753 m)   Wt 114 lb 13.8 oz (52.1 kg)   SpO2 95%   BMI 16.96 kg/m²   General:    Alert, cooperative, no distress. Psychiatric:    Normal Mood and affect    Eye/ENT:      Pupils equal, No asymmetry, Conjunctival pink. Able to hear voice at normal amplitude   Lungs:      Visibly symmetric chest expansion, No palpable tenderness. Clear to auscultation bilaterally. Heart[de-identified]    Regular rate and rhythm, S1, S2 normal, no murmur, click, rub or gallop. No JVD, Normal palpable peripheral pulses. No cyanosis   Abdomen:     Soft, non-tender. Bowel sounds normal. No masses,  No      organomegaly. Extremities:   Extremities normal, atraumatic, no edema. Neurologic:   CN II-XII grossly intact. No gross focal deficits           Data Review:     Radiology:   XR Results (most recent):  Results from Hospital Encounter encounter on 12/22/22    XR CHEST PORT    Narrative  INDICATION:  Rule out PNA    EXAM: Chest single view. COMPARISON: 12/31/2022. FINDINGS: A single frontal view of the chest at 0953 hours shows stable  bibasilar atelectasis and interstitial prominence. No new focal infiltrate. Small right pleural effusion stable. .  The heart, mediastinum and pulmonary  vasculature are stable status post median sternotomy . The bony thorax is  unremarkable for age. .    Impression  Stable bibasilar atelectasis and interstitial prominence with small right  pleural effusion. .  . CT Results (most recent):  Results from Hospital Encounter encounter on 12/22/22    CT ABD PELV WO CONT    Narrative  EXAM: CT ABD PELV WO CONT    INDICATION: rlq abdominal pain    COMPARISON: 5/3/2014    IV CONTRAST: None. ORAL CONTRAST: None    TECHNIQUE:  Thin axial images were obtained through the abdomen and pelvis.  Coronal and  sagittal reformats were generated. CT dose reduction was achieved through use of  a standardized protocol tailored for this examination and automatic exposure  control for dose modulation. The absence of intravenous contrast material reduces the sensitivity for  evaluation of the vasculature and solid organs. FINDINGS:  LOWER THORAX: Small bilateral pleural effusions. Partial collapse bases  LIVER: No mass. BILIARY TREE: Gallstones. No evidence of acute cholecystitis CBD is not dilated. SPLEEN: within normal limits. PANCREAS: No focal abnormality. ADRENALS: 19 mm right adrenal nodule unchanged,  KIDNEYS/URETERS: Mild bilateral hydronephrosis. No stone  STOMACH: Unremarkable. SMALL BOWEL: No dilatation or wall thickening. COLON: No dilatation or wall thickening. APPENDIX: Surgically absent  PERITONEUM: No ascites or pneumoperitoneum. RETROPERITONEUM: No lymphadenopathy or aortic aneurysm. Extensive vascular  calcifications  REPRODUCTIVE ORGANS: Mildly enlarged  URINARY BLADDER: Massive distention  BONES: No destructive bone lesion. ABDOMINAL WALL: Small umbilical hernia containing fat. ADDITIONAL COMMENTS: N/A    Impression  1. Bladder is massively distended with mild bilateral hydronephrosis. May  indicate bladder outlet obstruction. Prostate is mildly enlarged  2. Gallstones. No acute cholecystitis  3. Small bilateral pleural effusions    MRI Results (most recent):  Results from Hospital Encounter encounter on 12/22/22    MRI CARDIAC MORPH FUNC W WO CONT    Narrative  CARDIOVASCULAR MRI    INDICATION: suspected myocarditis. Dr. Tabitha Jansen aware of need for MRI and planned  for Tuesday at [facility]:    CPT Codes: 18174    SCANS PERFORMED:  Spin Echo: Axial.  FIESTA/SSFP  LGE    Contrast Agent: ProHance. Contrast Dose: 15ml. CLINICAL DATA:  HR = 103/min, BP = 81/61mmHg,  HT = 5 foot 9inc, WT = 108lb. Impression  1. Normal left ventricular cavity size.  Severe left ventricular systolic  dysfunction. Severe hypokinesis of the base to mid and distal anterior wall,  anteroseptal wall, anteroapical wall, apical septal wall and apex. Mild  hypokinesis of the lateral wall. 3-D LVEF 23%. 2. Normal right ventricular size and systolic function. 3. Sclerotic aortic valve leaflets. Mild to moderate aortic valve stenosis. Aortic valve area is 1.3 sq cm by planimetry. 4. On T2 W imaging there is no significant myocardial edema seen. On EGE and LGE  study, there is patchy subendocardial infarct involving LAD territory including  mid to distal anterior wall, anteroseptal wall, anteroapical wall, apex, apical  septal wall involving less than 25% thickness of the myocardial wall with  thinning of the apex and apical septal wall. There is medium grade myocardial  viability of these walls. There is a small to medium size subendocardial infarct  of the basal inferior wall. There is mild patchy subepicardial enhancement of  the lateral wall suggestive of nonischemic etiology. There is no features of  infiltrative sarcoidosis or cardiac amyloidosis. 5. Normal pleura and pericardium. There is no significant effusions. Pericardium:  Normal. (<3.5mm)  Pericardial Effusion:  None. Pleural Effusion:  None. VOLUMETRIC DATA:    LVEDVI:  71 ml/m2 (Normal 47-92 mL/sq-m)  LVESVI:  55 ml/m2 (Normal 13-30 mL/sq-m)  LVSVI:  17 ml/m2 (Normal 32-62 mL/sq-m)  LVMI:  59 g/m2      No results for input(s): CPK, TROIQ in the last 72 hours. No lab exists for component: CKQMB, CPKMB, BMPP    Recent Labs     01/07/23  0536 01/06/23  0435    133*   K 4.4 4.3    102   CO2 26 25   BUN 40* 56*   CREA 1.07 1.21   * 177*   PHOS 2.1* 2.3*   CA 9.1 9.4       Recent Labs     01/07/23  0536 01/06/23  0435   WBC 7.7 7.9   HGB 10.1* 9.0*   HCT 35.7* 31.3*    247       Recent Labs     01/07/23  0536 01/06/23  0435   * 179*         No results for input(s): CHOL, LDLC in the last 72 hours.     No lab exists for component: TGL, HDLC,  HBA1C  No results for input(s): CRP, TSH, TSHEXT, TSHEXT in the last 72 hours.     No lab exists for component: ESR      Current meds:    Current Facility-Administered Medications:     melatonin tablet 3 mg, 3 mg, Oral, QHS PRN, Modesta Goldberg NP, 3 mg at 01/07/23 2355    [Held by provider] DOBUTamine (DOBUTREX) 500 mg/250 mL (2,000 mcg/mL) infusion, 1 mcg/kg/min, IntraVENous, CONTINUOUS, Margaret Ng MD, Last Rate: 1.6 mL/hr at 01/06/23 1137, 1 mcg/kg/min at 01/06/23 1137    empagliflozin (JARDIANCE) tablet 10 mg, 10 mg, Oral, DAILY, Margaret Ng MD, 10 mg at 01/07/23 0852    dextrose 10 % infusion 0-250 mL, 0-250 mL, IntraVENous, PRN, Cathy Sheldon, CNS    insulin glargine (LANTUS) injection 8 Units, 8 Units, SubCUTAneous, QHS, Cathy hSeldon, CNS, 8 Units at 01/07/23 2134    albuterol-ipratropium (DUO-NEB) 2.5 MG-0.5 MG/3 ML, 3 mL, Nebulization, Q6H PRN, Gayla Varela MD    insulin lispro (HUMALOG) injection, , SubCUTAneous, AC&HS, Cathy Sheldon, CNS, 3 Units at 01/07/23 2134    digoxin (LANOXIN) tablet 0.125 mg, 0.125 mg, Oral, DAILY, Colleen Astorga NP, 0.125 mg at 01/07/23 2068    mirtazapine (REMERON) tablet 7.5 mg, 7.5 mg, Oral, QHS, Zack Alva MD, 7.5 mg at 01/07/23 2134    clopidogreL (PLAVIX) tablet 75 mg, 75 mg, Oral, DAILY, Mare Mccormick NP, 75 mg at 01/07/23 0852    [Held by provider] bumetanide (BUMEX) injection 2 mg, 2 mg, IntraVENous, Q12H, Desire Dodd MD, 2 mg at 01/03/23 0837    [Held by provider] potassium chloride SR (KLOR-CON 10) tablet 40 mEq, 40 mEq, Oral, BID, Beto MOLINA MD, 40 mEq at 01/03/23 0838    pantoprazole (PROTONIX) tablet 40 mg, 40 mg, Oral, ACB, Marianne Glance, DO, 40 mg at 01/07/23 0736    heparin (porcine) injection 5,000 Units, 5,000 Units, SubCUTAneous, Q12H, Marianne Glance, DO, 5,000 Units at 01/07/23 2134    QUEtiapine (SEROquel) tablet 50 mg, 50 mg, Oral, QHS, Kolleen Bran, Jerel Talley DO, 50 mg at 01/07/23 2134    lidocaine 4 % patch 1 Patch, 1 Patch, TransDERmal, Q24H, Sherie MOLINA MD, 1 Patch at 01/05/23 1556    balsam peru-castor oiL (VENELEX) ointment, , Topical, BID, Clarissa Miller MD, Given at 01/07/23 1841    alteplase (CATHFLO) 1 mg in sterile water (preservative free) 1 mL injection, 1 mg, InterCATHeter, PRN, Riaz Ken MD    bacitracin 500 unit/gram packet 1 Packet, 1 Packet, Topical, PRN, Sherie MOLINA MD    glucose chewable tablet 16 g, 4 Tablet, Oral, PRN, Sherie MOLINA MD    glucagon (GLUCAGEN) injection 1 mg, 1 mg, IntraMUSCular, PRN, Riaz Ken MD    senna-docusate (PERICOLACE) 8.6-50 mg per tablet 1 Tablet, 1 Tablet, Oral, BID PRN, Riaz Ken MD, 1 Tablet at 12/26/22 5811    bisacodyL (DULCOLAX) suppository 10 mg, 10 mg, Rectal, QHS PRN, Sherie MOLINA MD    sodium chloride (NS) flush 5-40 mL, 5-40 mL, IntraVENous, Q8H, Walker Aponte MD, 10 mL at 01/07/23 2136    sodium chloride (NS) flush 5-40 mL, 5-40 mL, IntraVENous, PRN, Riaz Ken MD, 10 mL at 12/28/22 0845    acetaminophen (TYLENOL) tablet 650 mg, 650 mg, Oral, Q6H PRN, 650 mg at 01/05/23 2234 **OR** acetaminophen (TYLENOL) suppository 650 mg, 650 mg, Rectal, Q6H PRN, Walker Corado MD    polyethylene glycol (MIRALAX) packet 17 g, 17 g, Oral, DAILY PRN, Riaz Ken MD, 17 g at 12/26/22 0649    ondansetron (ZOFRAN ODT) tablet 4 mg, 4 mg, Oral, Q8H PRN **OR** ondansetron (ZOFRAN) injection 4 mg, 4 mg, IntraVENous, Q6H PRN, Sherie MOLINA MD, 4 mg at 12/24/22 0849    aspirin delayed-release tablet 81 mg, 81 mg, Oral, DAILY, Francois Baker MD, 81 mg at 01/07/23 0852    ipratropium (ATROVENT) 21 mcg (0.03 %) nasal spray 2 Spray, 2 Spray, Both Nostrils, Q12H, Cuca Pearson MD, 2 Boles at 01/07/23 0696    tamsulosin (FLOMAX) capsule 0.4 mg, 0.4 mg, Oral, DAILY, Cuca Pearson MD, 0.4 mg at 01/07/23 0878    sodium chloride (NS) flush 5-40 mL, 5-40 mL, IntraVENous, PRN, MD Hafsa Porter MD  [facility] [facility] [address]  [place]  [phone]      Fiona Taylor MD

## 2023-01-08 NOTE — PROGRESS NOTES
Problem: Diabetes Self-Management  Goal: *Disease process and treatment process  Description: Define diabetes and identify own type of diabetes; list 3 options for treating diabetes. Outcome: Progressing Towards Goal  Goal: *Incorporating nutritional management into lifestyle  Description: Describe effect of type, amount and timing of food on blood glucose; list 3 methods for planning meals. Outcome: Progressing Towards Goal  Goal: *Incorporating physical activity into lifestyle  Description: State effect of exercise on blood glucose levels. Outcome: Progressing Towards Goal  Goal: *Developing strategies to promote health/change behavior  Description: Define the ABC's of diabetes; identify appropriate screenings, schedule and personal plan for screenings. Outcome: Progressing Towards Goal  Goal: *Using medications safely  Description: State effect of diabetes medications on diabetes; name diabetes medication taking, action and side effects. Outcome: Progressing Towards Goal  Goal: *Monitoring blood glucose, interpreting and using results  Description: Identify recommended blood glucose targets  and personal targets. Outcome: Progressing Towards Goal  Goal: *Prevention, detection, treatment of acute complications  Description: List symptoms of hyper- and hypoglycemia; describe how to treat low blood sugar and actions for lowering  high blood glucose level. Outcome: Progressing Towards Goal  Goal: *Prevention, detection and treatment of chronic complications  Description: Define the natural course of diabetes and describe the relationship of blood glucose levels to long term complications of diabetes.   Outcome: Progressing Towards Goal  Goal: *Developing strategies to address psychosocial issues  Description: Describe feelings about living with diabetes; identify support needed and support network  Outcome: Progressing Towards Goal     Problem: Falls - Risk of  Goal: *Absence of Falls  Description: Document Laverne Fall Risk and appropriate interventions in the flowsheet.   Outcome: Progressing Towards Goal  Note: Fall Risk Interventions:  Mobility Interventions: Assess mobility with egress test, Communicate number of staff needed for ambulation/transfer, Patient to call before getting OOB         Medication Interventions: Assess postural VS orthostatic hypotension, Evaluate medications/consider consulting pharmacy, Patient to call before getting OOB    Elimination Interventions: Call light in reach, Patient to call for help with toileting needs    History of Falls Interventions: Door open when patient unattended, Evaluate medications/consider consulting pharmacy         Problem: Patient Education: Go to Patient Education Activity  Goal: Patient/Family Education  Outcome: Progressing Towards Goal     Problem: Patient Education: Go to Patient Education Activity  Goal: Patient/Family Education  Outcome: Progressing Towards Goal     Problem: Heart Failure: Discharge Outcomes  Goal: *Demonstrates ability to perform prescribed activity without shortness of breath or discomfort  Outcome: Progressing Towards Goal  Goal: *Left ventricular function assessment completed prior to or during stay, or planned for post-discharge  Outcome: Progressing Towards Goal  Goal: *ACEI prescribed if LVEF less than 40% and no contraindications or ARB prescribed  Outcome: Progressing Towards Goal  Goal: *Verbalizes understanding and describes prescribed diet  Outcome: Progressing Towards Goal  Goal: *Verbalizes understanding/describes prescribed medications  Outcome: Progressing Towards Goal  Goal: *Describes available resources and support systems  Description: (eg: Home Health, Palliative Care, Advanced Medical Directive)  Outcome: Progressing Towards Goal  Goal: *Describes smoking cessation resources  Outcome: Progressing Towards Goal  Goal: *Understands and describes signs and symptoms to report to providers(Stroke Metric)  Outcome: Progressing Towards Goal  Goal: *Describes/verbalizes understanding of follow-up/return appt  Description: (eg: to physicians, diabetes treatment coordinator, and other resources  Outcome: Progressing Towards Goal  Goal: *Describes importance of continuing daily weights and changes to report to physician  Outcome: Progressing Towards Goal     Problem: Discharge Planning  Goal: *Discharge to safe environment  Outcome: Progressing Towards Goal  Goal: *Knowledge of medication management  Outcome: Progressing Towards Goal  Goal: *Knowledge of discharge instructions  Outcome: Progressing Towards Goal     Problem: Patient Education: Go to Patient Education Activity  Goal: Patient/Family Education  Outcome: Progressing Towards Goal     Problem: Patient Education: Go to Patient Education Activity  Goal: Patient/Family Education  Outcome: Progressing Towards Goal     Problem: Pressure Injury - Risk of  Goal: *Prevention of pressure injury  Description: Document Mike Scale and appropriate interventions in the flowsheet.   Outcome: Progressing Towards Goal  Note: Pressure Injury Interventions:  Sensory Interventions: Assess need for specialty bed    Moisture Interventions: Absorbent underpads, Minimize layers    Activity Interventions: Assess need for specialty bed    Mobility Interventions: Assess need for specialty bed, Pressure redistribution bed/mattress (bed type)    Nutrition Interventions: Document food/fluid/supplement intake    Friction and Shear Interventions: Minimize layers                Problem: Patient Education: Go to Patient Education Activity  Goal: Patient/Family Education  Outcome: Progressing Towards Goal     Problem: Infection - Risk of, Urinary Catheter-Associated Urinary Tract Infection  Goal: *Absence of infection signs and symptoms  Outcome: Progressing Towards Goal     Problem: Patient Education: Go to Patient Education Activity  Goal: Patient/Family Education  Outcome: Progressing Towards Goal Problem: Nutrition Deficit  Goal: *Optimize nutritional status  Outcome: Progressing Towards Goal     Problem: Pain  Goal: *Control of Pain  Outcome: Progressing Towards Goal  Goal: *PALLIATIVE CARE:  Alleviation of Pain  Outcome: Progressing Towards Goal     Problem: Patient Education: Go to Patient Education Activity  Goal: Patient/Family Education  Outcome: Progressing Towards Goal

## 2023-01-09 ENCOUNTER — APPOINTMENT (OUTPATIENT)
Dept: NON INVASIVE DIAGNOSTICS | Age: 73
DRG: 871 | End: 2023-01-09
Attending: NURSE PRACTITIONER
Payer: MEDICARE

## 2023-01-09 LAB
ALBUMIN SERPL-MCNC: 2.9 G/DL (ref 3.5–5)
ALBUMIN/GLOB SERPL: 0.9 (ref 1.1–2.2)
ALP SERPL-CCNC: 219 U/L (ref 45–117)
ALT SERPL-CCNC: 92 U/L (ref 12–78)
ANION GAP SERPL CALC-SCNC: 7 MMOL/L (ref 5–15)
AST SERPL-CCNC: 46 U/L (ref 15–37)
BILIRUB SERPL-MCNC: 0.3 MG/DL (ref 0.2–1)
BNP SERPL-MCNC: 6404 PG/ML
BUN SERPL-MCNC: 36 MG/DL (ref 6–20)
BUN/CREAT SERPL: 28 (ref 12–20)
CALCIUM SERPL-MCNC: 9.4 MG/DL (ref 8.5–10.1)
CHLORIDE SERPL-SCNC: 108 MMOL/L (ref 97–108)
CO2 SERPL-SCNC: 23 MMOL/L (ref 21–32)
CREAT SERPL-MCNC: 1.27 MG/DL (ref 0.7–1.3)
DIGOXIN SERPL-MCNC: 1.1 NG/ML (ref 0.9–2)
ECHO LV FRACTIONAL SHORTENING: 9 % (ref 28–44)
ECHO LV INTERNAL DIMENSION DIASTOLE INDEX: 3.35 CM/M2
ECHO LV INTERNAL DIMENSION DIASTOLIC: 5.5 CM (ref 4.2–5.9)
ECHO LV INTERNAL DIMENSION SYSTOLIC INDEX: 3.05 CM/M2
ECHO LV INTERNAL DIMENSION SYSTOLIC: 5 CM
ERYTHROCYTE [DISTWIDTH] IN BLOOD BY AUTOMATED COUNT: 22.4 % (ref 11.5–14.5)
GLOBULIN SER CALC-MCNC: 3.2 G/DL (ref 2–4)
GLUCOSE BLD STRIP.AUTO-MCNC: 129 MG/DL (ref 65–117)
GLUCOSE BLD STRIP.AUTO-MCNC: 169 MG/DL (ref 65–117)
GLUCOSE BLD STRIP.AUTO-MCNC: 243 MG/DL (ref 65–117)
GLUCOSE SERPL-MCNC: 192 MG/DL (ref 65–100)
HCT VFR BLD AUTO: 29.2 % (ref 36.6–50.3)
HGB BLD-MCNC: 8.7 G/DL (ref 12.1–17)
MAGNESIUM SERPL-MCNC: 2.2 MG/DL (ref 1.6–2.4)
MCH RBC QN AUTO: 23.8 PG (ref 26–34)
MCHC RBC AUTO-ENTMCNC: 29.8 G/DL (ref 30–36.5)
MCV RBC AUTO: 79.8 FL (ref 80–99)
NRBC # BLD: 0 K/UL (ref 0–0.01)
NRBC BLD-RTO: 0 PER 100 WBC
PHOSPHATE SERPL-MCNC: 2.4 MG/DL (ref 2.6–4.7)
PLATELET # BLD AUTO: 254 K/UL (ref 150–400)
PMV BLD AUTO: 11.5 FL (ref 8.9–12.9)
POTASSIUM SERPL-SCNC: 4.6 MMOL/L (ref 3.5–5.1)
PROCALCITONIN SERPL-MCNC: 0.21 NG/ML
PROT SERPL-MCNC: 6.1 G/DL (ref 6.4–8.2)
RBC # BLD AUTO: 3.66 M/UL (ref 4.1–5.7)
SERVICE CMNT-IMP: ABNORMAL
SODIUM SERPL-SCNC: 138 MMOL/L (ref 136–145)
WBC # BLD AUTO: 9 K/UL (ref 4.1–11.1)

## 2023-01-09 PROCEDURE — 74011250637 HC RX REV CODE- 250/637: Performed by: INTERNAL MEDICINE

## 2023-01-09 PROCEDURE — 76937 US GUIDE VASCULAR ACCESS: CPT | Performed by: INTERNAL MEDICINE

## 2023-01-09 PROCEDURE — 93451 RIGHT HEART CATH: CPT | Performed by: INTERNAL MEDICINE

## 2023-01-09 PROCEDURE — 77030013797 HC KT TRNSDUC PRSSR EDWD -A: Performed by: INTERNAL MEDICINE

## 2023-01-09 PROCEDURE — 74011250637 HC RX REV CODE- 250/637: Performed by: NURSE PRACTITIONER

## 2023-01-09 PROCEDURE — 74011000250 HC RX REV CODE- 250: Performed by: STUDENT IN AN ORGANIZED HEALTH CARE EDUCATION/TRAINING PROGRAM

## 2023-01-09 PROCEDURE — 93308 TTE F-UP OR LMTD: CPT

## 2023-01-09 PROCEDURE — 74011000250 HC RX REV CODE- 250: Performed by: INTERNAL MEDICINE

## 2023-01-09 PROCEDURE — 99152 MOD SED SAME PHYS/QHP 5/>YRS: CPT | Performed by: INTERNAL MEDICINE

## 2023-01-09 PROCEDURE — 74011250636 HC RX REV CODE- 250/636: Performed by: INTERNAL MEDICINE

## 2023-01-09 PROCEDURE — 82962 GLUCOSE BLOOD TEST: CPT

## 2023-01-09 PROCEDURE — 74011250636 HC RX REV CODE- 250/636: Performed by: STUDENT IN AN ORGANIZED HEALTH CARE EDUCATION/TRAINING PROGRAM

## 2023-01-09 PROCEDURE — 85027 COMPLETE CBC AUTOMATED: CPT

## 2023-01-09 PROCEDURE — 4A023N6 MEASUREMENT OF CARDIAC SAMPLING AND PRESSURE, RIGHT HEART, PERCUTANEOUS APPROACH: ICD-10-PCS | Performed by: INTERNAL MEDICINE

## 2023-01-09 PROCEDURE — 74011636637 HC RX REV CODE- 636/637: Performed by: CLINICAL NURSE SPECIALIST

## 2023-01-09 PROCEDURE — 2709999900 HC NON-CHARGEABLE SUPPLY: Performed by: INTERNAL MEDICINE

## 2023-01-09 PROCEDURE — 83880 ASSAY OF NATRIURETIC PEPTIDE: CPT

## 2023-01-09 PROCEDURE — 99232 SBSQ HOSP IP/OBS MODERATE 35: CPT | Performed by: INTERNAL MEDICINE

## 2023-01-09 PROCEDURE — 80053 COMPREHEN METABOLIC PANEL: CPT

## 2023-01-09 PROCEDURE — 77030013744: Performed by: INTERNAL MEDICINE

## 2023-01-09 PROCEDURE — 83735 ASSAY OF MAGNESIUM: CPT

## 2023-01-09 PROCEDURE — P9047 ALBUMIN (HUMAN), 25%, 50ML: HCPCS | Performed by: INTERNAL MEDICINE

## 2023-01-09 PROCEDURE — 74011250637 HC RX REV CODE- 250/637: Performed by: STUDENT IN AN ORGANIZED HEALTH CARE EDUCATION/TRAINING PROGRAM

## 2023-01-09 PROCEDURE — 84100 ASSAY OF PHOSPHORUS: CPT

## 2023-01-09 PROCEDURE — 74011250637 HC RX REV CODE- 250/637: Performed by: FAMILY MEDICINE

## 2023-01-09 PROCEDURE — C1894 INTRO/SHEATH, NON-LASER: HCPCS | Performed by: INTERNAL MEDICINE

## 2023-01-09 PROCEDURE — 80162 ASSAY OF DIGOXIN TOTAL: CPT

## 2023-01-09 PROCEDURE — 77030013406 HC CATH CTRL EDWD -B: Performed by: INTERNAL MEDICINE

## 2023-01-09 PROCEDURE — 84145 PROCALCITONIN (PCT): CPT

## 2023-01-09 PROCEDURE — 93308 TTE F-UP OR LMTD: CPT | Performed by: INTERNAL MEDICINE

## 2023-01-09 PROCEDURE — 65660000001 HC RM ICU INTERMED STEPDOWN

## 2023-01-09 PROCEDURE — 99233 SBSQ HOSP IP/OBS HIGH 50: CPT | Performed by: INTERNAL MEDICINE

## 2023-01-09 PROCEDURE — 36415 COLL VENOUS BLD VENIPUNCTURE: CPT

## 2023-01-09 PROCEDURE — C1751 CATH, INF, PER/CENT/MIDLINE: HCPCS | Performed by: INTERNAL MEDICINE

## 2023-01-09 RX ORDER — ALBUMIN HUMAN 250 G/1000ML
12.5 SOLUTION INTRAVENOUS ONCE
Status: COMPLETED | OUTPATIENT
Start: 2023-01-09 | End: 2023-01-10

## 2023-01-09 RX ORDER — INSULIN LISPRO 100 [IU]/ML
4 INJECTION, SOLUTION INTRAVENOUS; SUBCUTANEOUS
Status: DISCONTINUED | OUTPATIENT
Start: 2023-01-09 | End: 2023-01-13

## 2023-01-09 RX ORDER — LIDOCAINE HYDROCHLORIDE 10 MG/ML
INJECTION INFILTRATION; PERINEURAL AS NEEDED
Status: DISCONTINUED | OUTPATIENT
Start: 2023-01-09 | End: 2023-01-09 | Stop reason: HOSPADM

## 2023-01-09 RX ORDER — HEPARIN SODIUM 200 [USP'U]/100ML
INJECTION, SOLUTION INTRAVENOUS
Status: COMPLETED | OUTPATIENT
Start: 2023-01-09 | End: 2023-01-09

## 2023-01-09 RX ORDER — INSULIN LISPRO 100 [IU]/ML
INJECTION, SOLUTION INTRAVENOUS; SUBCUTANEOUS
Status: DISCONTINUED | OUTPATIENT
Start: 2023-01-09 | End: 2023-01-17

## 2023-01-09 RX ADMIN — IPRATROPIUM BROMIDE 2 SPRAY: 21 SPRAY, METERED NASAL at 21:53

## 2023-01-09 RX ADMIN — EMPAGLIFLOZIN 10 MG: 10 TABLET, FILM COATED ORAL at 09:11

## 2023-01-09 RX ADMIN — ALBUMIN (HUMAN) 12.5 G: 0.25 INJECTION, SOLUTION INTRAVENOUS at 17:34

## 2023-01-09 RX ADMIN — Medication 4 UNITS: at 17:27

## 2023-01-09 RX ADMIN — BUMETANIDE 1 MG: 1 TABLET ORAL at 09:13

## 2023-01-09 RX ADMIN — Medication: at 18:00

## 2023-01-09 RX ADMIN — DIGOXIN 0.12 MG: 125 TABLET ORAL at 09:11

## 2023-01-09 RX ADMIN — PANTOPRAZOLE SODIUM 40 MG: 40 TABLET, DELAYED RELEASE ORAL at 07:29

## 2023-01-09 RX ADMIN — MIRTAZAPINE 7.5 MG: 15 TABLET, FILM COATED ORAL at 21:52

## 2023-01-09 RX ADMIN — SPIRONOLACTONE 12.5 MG: 25 TABLET ORAL at 09:12

## 2023-01-09 RX ADMIN — CLOPIDOGREL BISULFATE 75 MG: 75 TABLET ORAL at 09:13

## 2023-01-09 RX ADMIN — HEPARIN SODIUM 5000 UNITS: 5000 INJECTION INTRAVENOUS; SUBCUTANEOUS at 09:11

## 2023-01-09 RX ADMIN — TAMSULOSIN HYDROCHLORIDE 0.4 MG: 0.4 CAPSULE ORAL at 09:11

## 2023-01-09 RX ADMIN — SODIUM CHLORIDE, PRESERVATIVE FREE 10 ML: 5 INJECTION INTRAVENOUS at 07:29

## 2023-01-09 RX ADMIN — Medication 1 UNITS: at 21:51

## 2023-01-09 RX ADMIN — SODIUM CHLORIDE, PRESERVATIVE FREE 10 ML: 5 INJECTION INTRAVENOUS at 17:28

## 2023-01-09 RX ADMIN — ASPIRIN 81 MG: 81 TABLET, COATED ORAL at 09:11

## 2023-01-09 RX ADMIN — QUETIAPINE FUMARATE 50 MG: 25 TABLET ORAL at 21:52

## 2023-01-09 RX ADMIN — HEPARIN SODIUM 5000 UNITS: 5000 INJECTION INTRAVENOUS; SUBCUTANEOUS at 21:52

## 2023-01-09 RX ADMIN — IPRATROPIUM BROMIDE 2 SPRAY: 21 SPRAY, METERED NASAL at 09:15

## 2023-01-09 RX ADMIN — SODIUM CHLORIDE, PRESERVATIVE FREE 10 ML: 5 INJECTION INTRAVENOUS at 21:53

## 2023-01-09 RX ADMIN — Medication: at 09:13

## 2023-01-09 RX ADMIN — INSULIN GLARGINE 8 UNITS: 100 INJECTION, SOLUTION SUBCUTANEOUS at 21:51

## 2023-01-09 NOTE — PROGRESS NOTES
TRANSFER - OUT REPORT:    Verbal report given to LORI Solis (name) on Sammie Schmitz  being transferred to [facility](unit) for routine progression of care       Report consisted of patients Situation, Background, Assessment and   Recommendations(SBAR). Information from the following report(s) Procedure Summary and Recent Results was reviewed with the receiving nurse. Lines:   Peripheral IV 01/09/23 Left;Posterior Hand (Active)        Opportunity for questions and clarification was provided.       Patient transported with:   Registered Nurse

## 2023-01-09 NOTE — PROGRESS NOTES
1930  Verbal bedside report given to REGGIE Brambila RN oncoming nurse by [facility]. Valerio Rose RN off-going nurse. Report included current pt status and condition, recent results, hx of present illness, heart rate and rhythm (NSR/ST), and respiratory status. 1345  Cath lab called to give report    1045  Cath lab came to get pt for cath. 1100 [facility] report to [facility] in Cath Lab    0945  Pt awake and alert, updated with by phone. 0730  Verbal bedside report received from REGGIE Brambila RN off-going nurse by [facility]. LORI Dixon oncoming nurse. Report included current pt status and condition, recent results, hx of present illness, heart rate and rhythm (NSR/ST), and respiratory status. Greeted pt and enquired about present needs and goals for the day.

## 2023-01-09 NOTE — PROGRESS NOTES
1930 Bedside shift change report given to [address] (oncoming nurse) by Luz Elena Rodriguez (offgoing nurse). Report included the following information SBAR, Kardex, Intake/Output, MAR, Recent Results, and Cardiac Rhythm NSR/Sinus tach . 0730 Bedside shift change report given to Irma (oncoming nurse) by [address] (offgoing nurse). Report included the following information SBAR, Kardex, Intake/Output, MAR, Recent Results, and Cardiac Rhythm NSR/Sinus tach . Problem: Patient Education: Go to Patient Education Activity  Goal: Patient/Family Education  Outcome: Progressing Towards Goal     Problem: Falls - Risk of  Goal: *Absence of Falls  Description: Document Watson Solo Fall Risk and appropriate interventions in the flowsheet.   Outcome: Progressing Towards Goal  Note: Fall Risk Interventions:  Mobility Interventions: Assess mobility with egress test, Communicate number of staff needed for ambulation/transfer, Patient to call before getting OOB         Medication Interventions: Assess postural VS orthostatic hypotension, Evaluate medications/consider consulting pharmacy, Patient to call before getting OOB    Elimination Interventions: Call light in reach, Patient to call for help with toileting needs    History of Falls Interventions: Door open when patient unattended, Evaluate medications/consider consulting pharmacy         Problem: Patient Education: Go to Patient Education Activity  Goal: Patient/Family Education  Outcome: Progressing Towards Goal     Problem: Patient Education: Go to Patient Education Activity  Goal: Patient/Family Education  Outcome: Progressing Towards Goal     Problem: Heart Failure: Discharge Outcomes  Goal: *Demonstrates ability to perform prescribed activity without shortness of breath or discomfort  Outcome: Progressing Towards Goal  Goal: *Verbalizes understanding and describes prescribed diet  Outcome: Progressing Towards Goal  Goal: *Verbalizes understanding/describes prescribed medications  Outcome: Progressing Towards Goal  Goal: *Describes available resources and support systems  Description: (eg: Home Health, Palliative Care, Advanced Medical Directive)  Outcome: Progressing Towards Goal  Goal: *Describes importance of continuing daily weights and changes to report to physician  Outcome: Progressing Towards Goal

## 2023-01-09 NOTE — PROGRESS NOTES
[facility] CARDIOLOGY   Care Note                  []Initial visit     [x]Established visit     Patient Name: Eloisa August - VTW:[birth year] - IOW:[id number]  Primary Cardiologist: Gustavo Rascon MD  Consulting Cardiologist: Taylor Dior md     Reason for initial visit:  dyspnea, decompensated HF, tachycardia. SUBJECTIVE:    Off dobutamine. Started on Digoxin. BP  doing well. No dyspnea. Assessment and Plan       1. Dyspnea/decompensated HFrEF- EF 20%-hepatic and renal function down to baseline indicating adequate tissue perfusion. Cardiogenic shock improving. Off dobutamine since . Plan to perform RHC tomorrow to assess cardiac output and filling pressures along with echo. Long-term prognosis still guarded and may end up requiring permanent/long-term LAD support if family interested. Heart failure team to manage. Discussed importance of continued activity to avoid muscle mass loss. Underwent RHC today with normal to low filling pressures but mildly reduced CI. CI of 2 l/min/m2 off dobutamine. Will await echocardiogram today to evaluate EF again. No evidence of RV dysfunction by RV Dp/Dt or RVSWI.      2  CAD - sp CABG in 2018, cath 9/8/2021 open LIMA, PCI to OM 2 all the way up to the left main 2/28/2022. Cath 12/6/2022 open LIMA and RCA graft some in-stent restenosis in the long graft from the left main to OM 2 but not severe and the OM1 was more severely diseased in the small vessel. Interventional cardiology felt no reasonable vessel to intervene and recommended medical management. The OM disease is consistent with a lateral wall ischemia demonstrated on prior nuclear stress test.  Continue aspirin, plavix. Holding statin for elevated LFTs. Continue to monitor CBC for anemia. Hgb up to 9.7 currently.       3.  Aortic stenosis mild mean gradient of 7 nn Hg, HUY 1.4 by echo 11/27/2022 -mild , murmur     4. Diabetes mellitus type 2- On insulin, management per IM     5. PAD: S/P Right SFA PTCA 12/6/22. Continue ASA, plavix. Statins can be reinitiated as needed. 6. CKD 2: creatinine normalizing    7. DNR status in place      HPI:     CAD with CABG 6/9/2018. NSTEMI in November 2016 and resolved pulmonary HTN. Stent to circumflex an LAD in 2016. Stress echo in 2018 with a drop in BP with exercise and a drop in EF. Cath on 6/22/18 that showed even with a 5F catheter, he dampened with suspected ostial 3 vessel disease. Echo 3/27/2021 = EF 55 to 60% mild AR MR TR LAE MAC  Nuclear 3/18/2021 EF 64% medium size defect apical and inferior lateral reversible ischemia medium defect mid and inferolateral nonreversible appear to be infarction intermediate risk stress test .  PCI 02/28/2022--patent LIMA to LAD, vein graft to distal RCA with severe disease in the native vessels Occluded vein graft to OM 2. Native OM 2 severely diseased along with proximal to mid circumflex as well as distal left main. Overall the vessel is significantly remodeled negatively. Additionally there is significant disease in the first marginal branch which is even smaller as well as AV groove branch right at the takeoff of OM2. Single stent 2.25 x 28 mm Xience was placed extending from the OM 2 into the circumflex into the distal left main and sequentially dilated with 2.25 noncompliant, 2 5 noncompliant, 3 oh noncompliant and 3 5 noncompliant to perform multilevel POT to manage multiple bifurcations on the way. Atherectomy was considered but given small size of the vessels, was not deemed to be absolutely safe for the obtuse marginal lesion. Overall stent expanded fairly well related to the size of the vessels.  Normal LVEDP  Nuclear stress October 27, 2022 test showed ischemia similar to 3/15/2021 with a medium size defect in the mid to distal inferolateral and anterolateral segments in the circumflex territory he had septal dyskinesia with an EF of 39%. He had a fixed apical infarct  Echocardiogram October 27, 2022 showed an EF of 45 to 50% TAPSE at 1.2 showing reduced RV function sclerosis of the aortic valve with stenosis that was very mild with a valve area of mean of 7 mmHg and HUY of 1.4 cm². The mitral was thickened with restricted mobility and mild MR.  ____________________________________________________________    Cardiac testing  12/22/22    ECHO ADULT COMPLETE 12/26/2022 12/26/2022    Interpretation Summary    Left Ventricle: Severely reduced left ventricular systolic function with a visually estimated EF of 25 - 30%. Left ventricle size is normal. Normal wall thickness. There are regional wall motion abnormalities. Grade II diastolic dysfunction with increased LAP. Right Ventricle: Moderately reduced systolic function. TAPSE is abnormal. TAPSE is 1.1 cm. Aortic Valve: Mild stenosis of the aortic valve. AV peak gradient is 13 mmHg. AV peak velocity is 1.8 m/s. Mitral Valve: Not well visualized. Moderate annular calcification at the posterior leaflet of the mitral valve. Mild to moderate regurgitation. Tricuspid Valve: Mildly elevated RVSP. Left Atrium: Left atrium is moderately dilated. Signed by: Sanchez Zhang MD on 12/26/2022  3:13 PM          10/27/22    NUCLEAR CARDIAC STRESS TEST 10/27/2022 11/1/2022    Interpretation Summary    Nuclear Findings: The study is positive for myocardial ischemia. The defect appears to be ischemia. The areas of ischemia are similar to 3/15/2021 study. Nuclear Findings: There is a moderate severity left ventricular stress perfusion defect that is medium in size present in the mid to distal inferolateral and anterolateral segment(s) that is reversible. The defect is consistent with abnormal perfusion in the LCx territory. There is normal wall motion in the defect area. The defect appears to be probable ischemia.  There is a moderate severity second left ventricular stress perfusion defect. The defect appears to be infarction. Nuclear Findings: Abnormal left ventricular systolic function post-stress. Septal dyskinesis. Post-stress ejection fraction is 39%. ECG: Resting ECG demonstrates normal sinus rhythm. ECG: Stress ECG was negative for ischemia. Stress Test: A pharmacological stress test was performed using lexiscan. Blood pressure demonstrated a normal response and heart rate demonstrated a normal response to stress. The patient's heart rate recovery was normal. The patient reported no symptoms during the stress test.    Signed by: Jen Abreu MD on 10/27/2022  4:45 PM    02/28/22    CARDIAC PROCEDURE 02/28/2022 2/28/2022    Conclusion  Findings  1. Severe native multivessel coronary disease  2. Patent LIMA to LAD, vein graft to distal RCA with severe disease in the native vessels  3. Occluded vein graft to OM 2. Native OM 2 severely diseased along with proximal to mid circumflex as well as distal left main. Overall the vessel is significantly remodeled negatively. Additionally there is significant disease in the first marginal branch which is even smaller as well as AV groove branch right at the takeoff of OM2. Single stent 2.25 x 28 mm Xience was placed extending from the OM 2 into the circumflex into the distal left main and sequentially dilated with 2.25 noncompliant, 2 5 noncompliant, 3 oh noncompliant and 3 5 noncompliant to perform multilevel POT to manage multiple bifurcations on the way. Atherectomy was considered but given small size of the vessels, was not deemed to be absolutely safe for the obtuse marginal lesion. Overall stent expanded fairly well related to the size of the vessels. 4.  Normal LVEDP    Access right radial: Small vessel, tortuous} brachiocephalic  Contrast 65 cc    Recommendations  1. Plavix based dual antiplatelet therapy  2.   Guideline directed medical therapy for secondary prevention of coronary events    Signed by: Soni Salgado MD on 2/28/2022  4:22 PM    Most recent HS troponins:  No results for input(s): TROPHS in the last 72 hours. No lab exists for component:  CKMB        Review of Systems    [x]All other systems reviewed and all negative except as written in HPI    [] Patient unable to provide secondary to condition         Past Medical History:   Diagnosis Date    CAD (coronary artery disease) 11/10/2016    NSTEMI & 2 stents    Deafness 10/28/2012    DM (diabetes mellitus) ([address]     Elevated cholesterol     Hypertension     NSTEMI (non-ST elevated myocardial infarction) ([address] 11/10/2016     Past Surgical History:   Procedure Laterality Date    COLONOSCOPY N/A 6/28/2018    COLONOSCOPY performed by Alida Chase MD at [address] [place] [address] St  11/11/2016    2 stents     Social Hx:  reports that he has never smoked. He has never been exposed to tobacco smoke. He has never used smokeless tobacco. He reports current alcohol use of about 2.0 standard drinks per week. He reports that he does not use drugs. Family Hx: family history includes Elevated Lipids in his brother, brother, and brother; Heart Attack in his father; Heart Disease in his father; Hypertension in his mother; No Known Problems in his daughter, sister, and son.   No Known Allergies       OBJECTIVE:  Wt Readings from Last 3 Encounters:   01/09/23 117 lb 4.6 oz (53.2 kg)   12/19/22 129 lb (58.5 kg)   12/16/22 126 lb 8.7 oz (57.4 kg)       Intake/Output Summary (Last 24 hours) at 1/9/2023 1301  Last data filed at 1/9/2023 0916  Gross per 24 hour   Intake 200 ml   Output 2750 ml   Net -2550 ml             Physical Exam    Vitals:   Vitals:    01/09/23 0916 01/09/23 1000 01/09/23 1058 01/09/23 1235   BP:   120/61 134/68   Pulse: 87 83 90 64   Resp: 19  18 16   Temp:   98.1 °F (36.7 °C)    SpO2: 100%   100%   Weight:       Height:         Telemetry: normal sinus rhythm  BP 134/68 (BP 1 Location: Left upper arm, BP Patient Position: At rest)   Pulse 64   Temp 98.1 °F (36.7 °C)   Resp 16   Ht 5' 9\" (1.753 m)   Wt 117 lb 4.6 oz (53.2 kg)   SpO2 100%   BMI 17.32 kg/m²   General:    Alert, cooperative, no distress. Psychiatric:    Normal Mood and affect    Eye/ENT:      Pupils equal, No asymmetry, Conjunctival pink. Able to hear voice at normal amplitude   Lungs:      Visibly symmetric chest expansion, No palpable tenderness. Clear to auscultation bilaterally. Heart[de-identified]    Regular rate and rhythm, S1, S2 normal, no murmur, click, rub or gallop. No JVD, Normal palpable peripheral pulses. No cyanosis   Abdomen:     Soft, non-tender. Bowel sounds normal. No masses,  No      organomegaly. Extremities:   Extremities normal, atraumatic, no edema. Neurologic:   CN II-XII grossly intact. No gross focal deficits           Data Review:     Radiology:   XR Results (most recent):  Results from Hospital Encounter encounter on 12/22/22    XR CHEST PORT    Narrative  INDICATION:  Rule out PNA    EXAM: Chest single view. COMPARISON: 12/31/2022. FINDINGS: A single frontal view of the chest at 0953 hours shows stable  bibasilar atelectasis and interstitial prominence. No new focal infiltrate. Small right pleural effusion stable. .  The heart, mediastinum and pulmonary  vasculature are stable status post median sternotomy . The bony thorax is  unremarkable for age. .    Impression  Stable bibasilar atelectasis and interstitial prominence with small right  pleural effusion. .  . CT Results (most recent):  Results from Hospital Encounter encounter on 12/22/22    CT ABD PELV WO CONT    Narrative  EXAM: CT ABD PELV WO CONT    INDICATION: rlq abdominal pain    COMPARISON: 5/3/2014    IV CONTRAST: None. ORAL CONTRAST: None    TECHNIQUE:  Thin axial images were obtained through the abdomen and pelvis. Coronal and  sagittal reformats were generated.  CT dose reduction was achieved through use of  a standardized protocol tailored for this examination and automatic exposure  control for dose modulation. The absence of intravenous contrast material reduces the sensitivity for  evaluation of the vasculature and solid organs. FINDINGS:  LOWER THORAX: Small bilateral pleural effusions. Partial collapse bases  LIVER: No mass. BILIARY TREE: Gallstones. No evidence of acute cholecystitis CBD is not dilated. SPLEEN: within normal limits. PANCREAS: No focal abnormality. ADRENALS: 19 mm right adrenal nodule unchanged,  KIDNEYS/URETERS: Mild bilateral hydronephrosis. No stone  STOMACH: Unremarkable. SMALL BOWEL: No dilatation or wall thickening. COLON: No dilatation or wall thickening. APPENDIX: Surgically absent  PERITONEUM: No ascites or pneumoperitoneum. RETROPERITONEUM: No lymphadenopathy or aortic aneurysm. Extensive vascular  calcifications  REPRODUCTIVE ORGANS: Mildly enlarged  URINARY BLADDER: Massive distention  BONES: No destructive bone lesion. ABDOMINAL WALL: Small umbilical hernia containing fat. ADDITIONAL COMMENTS: N/A    Impression  1. Bladder is massively distended with mild bilateral hydronephrosis. May  indicate bladder outlet obstruction. Prostate is mildly enlarged  2. Gallstones. No acute cholecystitis  3. Small bilateral pleural effusions    MRI Results (most recent):  Results from Hospital Encounter encounter on 12/22/22    MRI CARDIAC MORPH FUNC W WO CONT    Narrative  CARDIOVASCULAR MRI    INDICATION: suspected myocarditis. Dr. Laure Baker aware of need for MRI and planned  for Tuesday at [facility]:    CPT Codes: 91403    SCANS PERFORMED:  Spin Echo: Axial.  FIESTA/SSFP  LGE    Contrast Agent: ProHance. Contrast Dose: 15ml. CLINICAL DATA:  HR = 103/min, BP = 81/61mmHg,  HT = 5 foot 9inc, WT = 108lb. Impression  1. Normal left ventricular cavity size. Severe left ventricular systolic  dysfunction.  Severe hypokinesis of the base to mid and distal anterior wall,  anteroseptal wall, anteroapical wall, apical septal wall and apex. Mild  hypokinesis of the lateral wall. 3-D LVEF 23%. 2. Normal right ventricular size and systolic function. 3. Sclerotic aortic valve leaflets. Mild to moderate aortic valve stenosis. Aortic valve area is 1.3 sq cm by planimetry. 4. On T2 W imaging there is no significant myocardial edema seen. On EGE and LGE  study, there is patchy subendocardial infarct involving LAD territory including  mid to distal anterior wall, anteroseptal wall, anteroapical wall, apex, apical  septal wall involving less than 25% thickness of the myocardial wall with  thinning of the apex and apical septal wall. There is medium grade myocardial  viability of these walls. There is a small to medium size subendocardial infarct  of the basal inferior wall. There is mild patchy subepicardial enhancement of  the lateral wall suggestive of nonischemic etiology. There is no features of  infiltrative sarcoidosis or cardiac amyloidosis. 5. Normal pleura and pericardium. There is no significant effusions. Pericardium:  Normal. (<3.5mm)  Pericardial Effusion:  None. Pleural Effusion:  None. VOLUMETRIC DATA:    LVEDVI:  71 ml/m2 (Normal 47-92 mL/sq-m)  LVESVI:  55 ml/m2 (Normal 13-30 mL/sq-m)  LVSVI:  17 ml/m2 (Normal 32-62 mL/sq-m)  LVMI:  59 g/m2      No results for input(s): CPK, TROIQ in the last 72 hours. No lab exists for component: CKQMB, CPKMB, BMPP    Recent Labs     01/09/23  0033 01/08/23  0616    138   K 4.6 4.8    108   CO2 23 24   BUN 36* 36*   CREA 1.27 1.26   * 135*   PHOS 2.4* 2.7   CA 9.4 9.1       Recent Labs     01/09/23  0033 01/07/23  0536   WBC 9.0 7.7   HGB 8.7* 10.1*   HCT 29.2* 35.7*    273       Recent Labs     01/09/23  0033 01/08/23  0616   * 220*         No results for input(s): CHOL, LDLC in the last 72 hours.     No lab exists for component: TGL, HDLC,  HBA1C  No results for input(s): CRP, TSH, TSHEXT, TSHEXT in the last 72 hours.     No lab exists for component: ESR      Current meds:    Current Facility-Administered Medications:     heparinized saline 2 units/mL infusion, , , CONTINUOUS, Buddy Mccracken MD, 1,000 mL at 01/09/23 1232    lidocaine (XYLOCAINE) 10 mg/mL (1 %) injection, , , PRN, Olu Parker MD, 4 mL at 01/09/23 1248    bumetanide (BUMEX) tablet 1 mg, 1 mg, Oral, DAILY, Shaila, Lizette B, NP, 1 mg at 01/09/23 1876    spironolactone (ALDACTONE) tablet 12.5 mg, 12.5 mg, Oral, DAILY, Shaila, Lizette B, NP, 12.5 mg at 01/09/23 0912    melatonin tablet 3 mg, 3 mg, Oral, QHS PRN, Modesta Short NP, 3 mg at 01/07/23 2355    [Held by provider] DOBUTamine (DOBUTREX) 500 mg/250 mL (2,000 mcg/mL) infusion, 1 mcg/kg/min, IntraVENous, CONTINUOUS, Nasir Nichols MD, Last Rate: 1.6 mL/hr at 01/06/23 1137, 1 mcg/kg/min at 01/06/23 1137    empagliflozin (JARDIANCE) tablet 10 mg, 10 mg, Oral, DAILY, Nasir Nichols MD, 10 mg at 01/09/23 0911    dextrose 10 % infusion 0-250 mL, 0-250 mL, IntraVENous, PRN, Cathy Sheldon CNS    insulin glargine (LANTUS) injection 8 Units, 8 Units, SubCUTAneous, QHS, Cathy Sheldon CNS, 8 Units at 01/08/23 2126    albuterol-ipratropium (DUO-NEB) 2.5 MG-0.5 MG/3 ML, 3 mL, Nebulization, Q6H PRN, Olu Parker MD    insulin lispro (HUMALOG) injection, , SubCUTAneous, AC&HS, Cathy Sheldon CNS, 4 Units at 01/08/23 2140    digoxin (LANOXIN) tablet 0.125 mg, 0.125 mg, Oral, DAILY, Colleen Astorga NP, 0.125 mg at 01/09/23 0911    mirtazapine (REMERON) tablet 7.5 mg, 7.5 mg, Oral, QHS, Steph Allen MD, 7.5 mg at 01/08/23 2126    clopidogreL (PLAVIX) tablet 75 mg, 75 mg, Oral, DAILY, Avila Mccormick NP, 75 mg at 01/09/23 0913    [Held by provider] potassium chloride SR (KLOR-CON 10) tablet 40 mEq, 40 mEq, Oral, BID, Sarah MOLINA MD, 40 mEq at 01/03/23 0838    pantoprazole (PROTONIX) tablet 40 mg, 40 mg, Oral, ACB, Christian Batter, DO, 40 mg at 01/09/23 0729    heparin (porcine) injection 5,000 Units, 5,000 Units, SubCUTAneous, Q12H, Susanne Dejesus DO, 5,000 Units at 01/09/23 0911    QUEtiapine (SEROquel) tablet 50 mg, 50 mg, Oral, QHS, Susanne Dejesus DO, 50 mg at 01/08/23 2126    lidocaine 4 % patch 1 Patch, 1 Patch, TransDERmal, Q24H, Wicho MOLINA MD, 1 Patch at 01/05/23 1556    balsam peru-castor oiL (VENELEX) ointment, , Topical, BID, Jay Harden MD, Given at 01/09/23 0913    alteplase (CATHFLO) 1 mg in sterile water (preservative free) 1 mL injection, 1 mg, InterCATHeter, PRN, Francesca White MD    bacitracin 500 unit/gram packet 1 Packet, 1 Packet, Topical, PRN, Wicoh MOLINA MD    glucose chewable tablet 16 g, 4 Tablet, Oral, PRN, Wicho MOLINA MD    glucagon (GLUCAGEN) injection 1 mg, 1 mg, IntraMUSCular, PRN, Francesca White MD    senna-docusate (PERICOLACE) 8.6-50 mg per tablet 1 Tablet, 1 Tablet, Oral, BID PRN, Francesca White MD, 1 Tablet at 12/26/22 5408    bisacodyL (DULCOLAX) suppository 10 mg, 10 mg, Rectal, QHS PRN, Wicho MOLINA MD    sodium chloride (NS) flush 5-40 mL, 5-40 mL, IntraVENous, Q8H, Walker Aponte MD, 10 mL at 01/09/23 0729    sodium chloride (NS) flush 5-40 mL, 5-40 mL, IntraVENous, PRN, Francesca White MD, 10 mL at 12/28/22 0845    acetaminophen (TYLENOL) tablet 650 mg, 650 mg, Oral, Q6H PRN, 650 mg at 01/05/23 2234 **OR** acetaminophen (TYLENOL) suppository 650 mg, 650 mg, Rectal, Q6H PRN, Walker Black MD    polyethylene glycol (MIRALAX) packet 17 g, 17 g, Oral, DAILY PRN, Francesca White MD, 17 g at 12/26/22 0649    ondansetron (ZOFRAN ODT) tablet 4 mg, 4 mg, Oral, Q8H PRN **OR** ondansetron (ZOFRAN) injection 4 mg, 4 mg, IntraVENous, Q6H PRN, Wicho MOLINA MD, 4 mg at 12/24/22 0849    aspirin delayed-release tablet 81 mg, 81 mg, Oral, DAILY, MichelAnnie MD, 81 mg at 01/09/23 0911    ipratropium (ATROVENT) 21 mcg (0.03 %) nasal spray 2 Spray, 2 Spray, Both Nostrils, Q12H, Ranjit Pearson MD, 2 [place] at 01/09/23 0915    tamsulosin (FLOMAX) capsule 0.4 mg, 0.4 mg, Oral, DAILY, Ranjit Pearson MD, 0.4 mg at 01/09/23 0911    sodium chloride (NS) flush 5-40 mL, 5-40 mL, IntraVENous, PRN, MD Francine Lewis MD  [facility], [address] [address]  [facility]  [phone]      Crista Sawyer MD

## 2023-01-09 NOTE — PROGRESS NOTES
[address] [facility]  Hospitalist Group                                                                                          Hospitalist Progress Note  Pat Baugh MD  Answering service: [phone] from in house phone        Date of Service:  2023  NAME:  Mary Alfonso  :  1950  MRN:  [id number]      Admission Summary:   Mary Alfonso is a 71 y.o. male who presents with progressively worsening shortness of breath for past several days. It has gotten worse to the point that he can only ambulate a few steps due to severe dyspnea and near syncope. Patient also noted abdominal distention which is increasing. Patient with known history of cardiomyopathy and recently admitted for CHF exacerbation a week ago. On presentation to the ER, chest x-ray shows diffuse airspace disease atypical infection versus edema. Also patient was noted to have elevated troponin and BNP. Patient was further evaluated by CT abdomen and pelvis which shows massively distended bladder with some bilateral hydronephrosis, subsequently Vasquez was placed. Patient also with significant leukocytosis as well as tachycardic and elevated lactic acid level and subsequently code sepsis was called. Hospitalist service requested admit the patient for further evaluation management.      The patient denies any headache, blurry vision, sore throat, trouble swallowing, trouble with speech, chest pain, SOB, cough, fever, chills, N/V/D, abd pain, urinary symptoms, constipation, recent travels, sick contacts, focal or generalized neurological symptoms, falls, injuries, rashes, contact with COVID-19 diagnosed patients, hematemesis, melena, hemoptysis, hematuria, rashes, denies starting any new medications and denies any other concerns or problems besides as mentioned above       Interval history / Subjective:     Patient seen and examined status post right heart cath  Family at bedside Vasquez catheter replaced back due to retention of urine  LVAD work-up underway we will follow-up  Pt is off dobutamine     Assessment & Plan:     Acute on chronic systolic congestive heart failure   Cardiogenic shock due to above - precluding comprehensive GDMT Rx for HF.   CAD   S/p CABG   Ischemic Cardiomyopathy      Appreciate help from Heart failure team.   S/p RHC -RHC today with normal to low filling pressures but mildly reduced CI. CI of 2 l/min/m2 off dobutamine. Will await echocardiogram today to evaluate EF again. No evidence of RV dysfunction by RV Dp/Dt or RVSWI. Dobutamine drip is being off  Continue empagliflozin for assistance with HF   Continue Dig for HF assistance. TNANER on CKD stage III   Improving   Monitor daily weight , I/O      GERD -chronic   No change in current Rx. Depression  Hospital delirium  -  Continue Seroquel as is QHS - at 50mg   Continue Remeron as was PTA      T2DM -on Insulin   Controlled Romain diet   Insulin NPH Six units BID      BPH -chronic   Continue Tamsulosin      PAD   S/p Right SFA PTCA -followed by Dr. Srikanth Salazar      Body mass index is 17.39 kg/m². - severe protein romain malnutrition         Code status: DNR  Prophylaxis: heparin   Care Plan discussed with: pt, RN   Anticipated Disposition: greater than 48 hours,   Being worked up and considered for possible [id number] Problems  Date Reviewed: 12/5/2022            Codes Class Noted POA    Systolic CHF, acute on chronic ([facility] ICD-10-CM: I50.23  ICD-9-CM: 428.23, 428.0  12/29/2022 Yes        Receiving inotropic medication ICD-10-CM: Z79.899  ICD-9-CM: V49.89  12/29/2022 Unknown        Sepsis ([facility] ICD-10-CM: A41.9  ICD-9-CM: 038.9, 995.91  12/22/2022 Unknown       Review of Systems:   A comprehensive review of systems was negative except for that written in the HPI. Vital Signs:    Last 24hrs VS reviewed since prior progress note.  Most recent are:  Visit Vitals  BP (!) 113/42   Pulse 91   Temp 97.7 °F (36.5 °C)   Resp 20   Ht 5' 9\" (1.753 m)   Wt 53.1 kg (117 lb)   SpO2 100%   BMI 17.28 kg/m²         Intake/Output Summary (Last 24 hours) at 1/9/2023 1741  Last data filed at 1/9/2023 1600  Gross per 24 hour   Intake 150 ml   Output 3250 ml   Net -3100 ml          Physical Examination:     I had a face to face encounter with this patient and independently examined them on 1/9/2023 as outlined below:          Constitutional:  No acute distress, cooperative, pleasant, frail elderly    ENT:  Oral mucosa dry  oropharynx benign. Resp:  CTA bilaterally. No wheezing/rhonchi/rales. No accessory muscle use. CV:  Regular rhythm, normal rate, no murmurs, gallops, rubs    GI:  Soft, non distended, non tender. normoactive bowel sounds, no hepatosplenomegaly     Musculoskeletal:  No edema, warm, 2+ pulses throughout    Neurologic:  Moves all extremities. Awake and alert, CN II-XII reviewed            Data Review:    Review and/or order of clinical lab test  Review and/or order of tests in the radiology section of CPT  Review and/or order of tests in the medicine section of CPT      Labs:     Recent Labs     01/09/23 0033 01/07/23  0536   WBC 9.0 7.7   HGB 8.7* 10.1*   HCT 29.2* 35.7*    273       Recent Labs     01/09/23 0033 01/08/23 0616 01/07/23  0536    138 137   K 4.6 4.8 4.4    108 107   CO2 23 24 26   BUN 36* 36* 40*   CREA 1.27 1.26 1.07   * 135* 110*   CA 9.4 9.1 9.1   MG 2.2 2.2 2.2   PHOS 2.4* 2.7 2.1*       Recent Labs     01/09/23 0033 01/08/23 0616 01/07/23  0536   ALT 92* 111* 95*   * 220* 172*   TBILI 0.3 0.4 0.4   TP 6.1* 5.9* 6.3*   ALB 2.9* 3.0* 3.1*   GLOB 3.2 2.9 3.2       No results for input(s): INR, PTP, APTT, INREXT, INREXT in the last 72 hours. No results for input(s): FE, TIBC, PSAT, FERR in the last 72 hours. Lab Results   Component Value Date/Time    Folate 10.5 07/03/2018 09:24 AM        No results for input(s): PH, PCO2, PO2 in the last 72 hours.   No results for input(s): CPK, CKNDX, TROIQ in the last 72 hours.     No lab exists for component: CPKMB    Lab Results   Component Value Date/Time    Cholesterol, total 143 10/12/2022 09:10 AM    HDL Cholesterol 49 10/12/2022 09:10 AM    LDL, calculated 41.4 10/12/2022 09:10 AM    Triglyceride 263 (H) 10/12/2022 09:10 AM    CHOL/HDL Ratio 2.9 10/12/2022 09:10 AM     Lab Results   Component Value Date/Time    Glucose (POC) 169 (H) 01/09/2023 04:24 PM    Glucose (POC) 129 (H) 01/09/2023 06:38 AM    Glucose (POC) 346 (H) 01/08/2023 09:24 PM    Glucose (POC) 146 (H) 01/08/2023 04:20 PM    Glucose (POC) 208 (H) 01/08/2023 12:45 PM     Lab Results   Component Value Date/Time    Color YELLOW/STRAW 01/01/2023 09:13 AM    Appearance CLEAR 01/01/2023 09:13 AM    Specific gravity 1.013 01/01/2023 09:13 AM    Specific gravity 1.020 05/03/2014 08:18 AM    pH (UA) 5.5 01/01/2023 09:13 AM    Protein 30 (A) 01/01/2023 09:13 AM    Glucose Negative 01/01/2023 09:13 AM    Ketone Negative 01/01/2023 09:13 AM    Bilirubin Negative 01/01/2023 09:13 AM    Urobilinogen 1.0 01/01/2023 09:13 AM    Nitrites Negative 01/01/2023 09:13 AM    Leukocyte Esterase Negative 01/01/2023 09:13 AM    Epithelial cells FEW 01/01/2023 09:13 AM    Bacteria Negative 01/01/2023 09:13 AM    WBC 0-4 01/01/2023 09:13 AM    RBC 0-5 01/01/2023 09:13 AM         Medications Reviewed:     Current Facility-Administered Medications   Medication Dose Route Frequency    insulin lispro (HUMALOG) injection 4 Units  4 Units SubCUTAneous TID WITH MEALS    insulin lispro (HUMALOG) injection   SubCUTAneous AC&HS    dextrose 10 % infusion 0-250 mL  0-250 mL IntraVENous PRN    [Held by provider] bumetanide (BUMEX) tablet 1 mg  1 mg Oral DAILY    spironolactone (ALDACTONE) tablet 12.5 mg  12.5 mg Oral DAILY    melatonin tablet 3 mg  3 mg Oral QHS PRN    [Held by provider] DOBUTamine (DOBUTREX) 500 mg/250 mL (2,000 mcg/mL) infusion  1 mcg/kg/min IntraVENous CONTINUOUS    empagliflozin (JARDIANCE) tablet 10 mg  10 mg Oral DAILY    dextrose 10 % infusion 0-250 mL  0-250 mL IntraVENous PRN    insulin glargine (LANTUS) injection 8 Units  8 Units SubCUTAneous QHS    albuterol-ipratropium (DUO-NEB) 2.5 MG-0.5 MG/3 ML  3 mL Nebulization Q6H PRN    digoxin (LANOXIN) tablet 0.125 mg  0.125 mg Oral DAILY    mirtazapine (REMERON) tablet 7.5 mg  7.5 mg Oral QHS    clopidogreL (PLAVIX) tablet 75 mg  75 mg Oral DAILY    [Held by provider] potassium chloride SR (KLOR-CON 10) tablet 40 mEq  40 mEq Oral BID    pantoprazole (PROTONIX) tablet 40 mg  40 mg Oral ACB    heparin (porcine) injection 5,000 Units  5,000 Units SubCUTAneous Q12H    QUEtiapine (SEROquel) tablet 50 mg  50 mg Oral QHS    lidocaine 4 % patch 1 Patch  1 Patch TransDERmal Q24H    balsam peru-castor oiL (VENELEX) ointment   Topical BID    alteplase (CATHFLO) 1 mg in sterile water (preservative free) 1 mL injection  1 mg InterCATHeter PRN    bacitracin 500 unit/gram packet 1 Packet  1 Packet Topical PRN    glucose chewable tablet 16 g  4 Tablet Oral PRN    glucagon (GLUCAGEN) injection 1 mg  1 mg IntraMUSCular PRN    senna-docusate (PERICOLACE) 8.6-50 mg per tablet 1 Tablet  1 Tablet Oral BID PRN    bisacodyL (DULCOLAX) suppository 10 mg  10 mg Rectal QHS PRN    sodium chloride (NS) flush 5-40 mL  5-40 mL IntraVENous Q8H    sodium chloride (NS) flush 5-40 mL  5-40 mL IntraVENous PRN    acetaminophen (TYLENOL) tablet 650 mg  650 mg Oral Q6H PRN    Or    acetaminophen (TYLENOL) suppository 650 mg  650 mg Rectal Q6H PRN    polyethylene glycol (MIRALAX) packet 17 g  17 g Oral DAILY PRN    ondansetron (ZOFRAN ODT) tablet 4 mg  4 mg Oral Q8H PRN    Or    ondansetron (ZOFRAN) injection 4 mg  4 mg IntraVENous Q6H PRN    aspirin delayed-release tablet 81 mg  81 mg Oral DAILY    ipratropium (ATROVENT) 21 mcg (0.03 %) nasal spray 2 Spray  2 Spray Both Nostrils Q12H    tamsulosin (FLOMAX) capsule 0.4 mg  0.4 mg Oral DAILY    sodium chloride (NS) flush 5-40 mL  5-40 mL IntraVENous PRN ______________________________________________________________________  EXPECTED LENGTH OF STAY: 5d 0h  ACTUAL LENGTH OF STAY:          Marcello Hall MD

## 2023-01-09 NOTE — PROGRESS NOTES
TRANSFER - IN REPORT:    Verbal report received from Irma RN(name) on Moses Benson  being received from [facility] (unit) for ordered procedure      Report consisted of patients Situation, Background, Assessment and   Recommendations(SBAR). Information from the following report(s) SBAR was reviewed with the receiving nurse. Opportunity for questions and clarification was provided. Assessment completed upon patients arrival to unit and care assumed. 1045: arrived to cath lab, consent signed, hooked up to monitor. Right neck, arm groin shaved. Both IV's painful with flushing and no blood return noted.  Both removed and #20 L hand IV started with NS @ 25 mL/hr

## 2023-01-09 NOTE — PROGRESS NOTES
Physical Therapy      Reviewed chart and noted pt is currently EVAN. Will defer at this time and continue to follow.

## 2023-01-09 NOTE — DIABETES MGMT
Steroids weaned to off and basal now at baseline basal insulin- 8 units daily. His fasting BG is in goal so will continue current basal dose. 10mg Jardiance daily was started for heart failure management on Friday. Despite Jardiance and Glargine, he continued to have pre-prandial hyperglycemia this weekend and bolus insulin will resume at 4 units humalog/meal.  Will see again tomorrow when back from cath lab.      Frankey Marcus, [facility]  [facility]

## 2023-01-09 NOTE — PROGRESS NOTES
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, LVEF 25-30%, stage C, NYHA class IIIA  Unable to up-titrate GDMT for HF due to hypotension and orthostasis likely due to overdiuresis; now weaning dobutamine gtt as BP, CrCl, pro-NT-BNP and lactic improves with holding diuretics; still dry by RHC today with index 1.8  Most likely etiology of cardiomyopathy: CAD +/- TRISTAN +/- inappropriate sinus tach +/- undergoing workup (cardiac MRI with ischemic CM, PYP equivocal, gammopathy and genetics pending)  In order to determine prognosis and timing of consideration for LVAD-DT evaluation; patient will need RHC off inotropes; scheduled for 23     RECOMMENDATIONS:  Consider milrinone once euvolemic  Hold diuretics; will give albumin x once  Once orthosthasis resolves then will start ARB/ARNi  Start low dose Spironolactone 12.5mg daily, watch for hyponatremia  Continue jardiance 10mg daily  6 minute walk- will need PT  Obtain MOCA test (screening test for LVAD/inotrope candidacy)  Inpatient sleep medicine consult pending (high risk for TRISTAN)  Continue digoxin 0.125mcg daily, check digoxin level daily (today 0.7, goal 0.7-1.2)  NOTE: insulin needs adjustments with wean of steroids/jardiance  Repeat echo off inotropes on Monday  Uric acid level 6.8  Cannot tolerate beta-blockers due to dobutamine  Cannot tolerate ACEi/ARB/ARNi/MRA due to acute renal failure  Continue baby aspirin and plavix  DNR  Physical therapy/ambulate daily     All other care per primary team,      IMPRESSION:  Acute on chronic combined systolic/diastolic  heart failure  Stage D, NYHA class IV symptoms  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, LVEF 25-30% and 23% (by cMRI 1/3/23)  Undergoing evaluation for cardiac amyloidosis with cMRI  Coronary artery disease  CAD s/p CABG x 2: further disease best managed medically due to small vessel size   Peripheral arterial disease  Bilateral hydronephrosis s/p donnelly  Cardiac risk factors:  HTN  HL  TRISTAN, STOP-BANG 4  DM2  CKD, stage 3      INTERVAL EVENTS:  NAEO  Ambulating well  Improved appetite   Afebrile  Dobutamine held  -130s/50s, HR 80-90s  I/O net neg 1L  Wt up 3lbs  Cr 1.27  K 4.9  Alb 2.9  T Bili stable  LFTs up slightly today   PCT trending down        LIFE GOALS:  Lifestyle goals reviewed with the patient. Patient's personal goals include: TBD  Important upcoming milestones or family events: TBD  The patient identifies the following as important for living well: TBD     Patient assessed. High suspicion of sleep apnea due to the following reasons. Will refer for sleep evaluation. [x] Heart Failure  [] Hypertention  [x] Atrial Fibrillation  [] BMI > 30  [] History of stroke  [] Diabetes  [x] Heavy snoring  [] Witnessed apnea  [] Hypoxemia                    HPI:  71 y.o. male who was admitted via ED for worsening SOB, poor appetite, orthopnea and fatigue. Pmhx of CAD s/p CABG, PAD, DM 2, recent admission for HFmrEF earlier this month. Serial TTEs show progressive decline in EF since 3/2021 with the most recent EF at 25-30%. Recent LHC shows diffuse CAD and no interventions indicated. Patient had [address] recently and there is some thought to myocarditis or other etiology causing worsening HF. [facility] consulted for further evaluation and management of HFrEF. CARDIAC IMAGING:  Echo 12/26/22    Left Ventricle: Severely reduced left ventricular systolic function with a visually estimated EF of 25 - 30%. Left ventricle size is normal. Normal wall thickness. There are regional wall motion abnormalities.  Grade II diastolic dysfunction with increased LAP. Right Ventricle: Moderately reduced systolic function. TAPSE is abnormal. TAPSE is 1.1 cm. Aortic Valve: Mild stenosis of the aortic valve. AV peak gradient is 13 mmHg. AV peak velocity is 1.8 m/s. Mitral Valve: Not well visualized. Moderate annular calcification at the posterior leaflet of the mitral valve. Mild to moderate regurgitation. Tricuspid Valve: Mildly elevated RVSP. Left Atrium: Left atrium is moderately dilated. 12/8/22    Left Ventricle: Moderately reduced left ventricular systolic function with a visually estimated EF of 35 - 40%. Severe hypokinesis of the following segments: mid anteroseptal, apical anterior, apical septal, apical inferior and apical lateral. Severe hypokinesis of the apex. Mitral Valve: Severely thickened leaflet, at the anterior and posterior leaflets. Severely calcified leaflet, at the anterior and posterior leaflets. Mild annular calcification of the mitral valve. Moderate regurgitation. Left Atrium: Left atrium is mildly dilated. Contrast used: Definity. limited study     EKG 12/22/22 ST, Biatria enlargement, marked ST abnormality     LHC 12/6/22  1. Normal LVEDP  2. Severe native multivessel coronary artery disease  3. Patent LIMA to LAD and vein graft to distal RCA  4. Recurrent ISR in OM1 stent with now 60 to 70% restenosis  5. Recoil of left main and circumflex stent with now recurrent 40 to 50% stenosis. 6.  Progression of ostial left main disease now to about 60% stenosis  7. Progression of disease in jailed first marginal branch now with diffuse 90% stenosis  8.   High-grade stenosis in the mid to distal right potential femoral artery treated with 6 x 40 mm impact drug-coated balloon angioplasty to reduce the stenosis to less than 40%     NST        HEMODYNAMICS:  RHC not done  CPEST too ill   6MW too ill       PHYSICAL EXAM:  Visit Vitals  BP (!) 113/42   Pulse 91   Temp 97.7 °F (36.5 °C)   Resp 20   Ht 5' 9\" (1.753 m)   Wt 117 lb (53.1 kg)   SpO2 100%   BMI 17.28 kg/m²     General: Patient is well developed, well-nourished in no acute distress  HEENT: Unremarkable   Neck: Supple. No evidence of thyroid enlargements or lymphadenopathy. JVD: Cannot be appreciated   Lungs: Breath sounds are equal and clear bilaterally. No wheezes, rhonchi, or rales. Heart: Regular rate and rhythm with normal S1 and S2. No murmurs, gallops or rubs. Abdomen: Soft, no mass or tenderness. No organomegaly or hernia. Bowel sounds present. Genitourinary and rectal: deferred  Extremities: No cyanosis, clubbing, or edema. Neurologic: No focal sensory or motor deficits are noted. Grossly intact. Psychiatric: Awake, alert an doriented x 3. Appropriate mood and affect. Skin: Warm, dry and well perfused. REVIEW OF SYSTEMS:  General: Denies fever. Ear, nose and throat: Denies difficulty hearing, sinus problems, nosebleeds  Cardiovascular: see above in the interval history  Respiratory: Denies cough, wheezing, sputum production, hemoptysis.   Gastrointestinal: Denies heartburn, constipation, diarrhea, abdominal pain, nausea, blood in stool  Kidney and bladder: Denies painful urination, frequent urination  Musculoskeletal: Denies joint pain, muscle weakness  Skin and hair: Denies change in existing skin lesions    PAST MEDICAL HISTORY:  Past Medical History:   Diagnosis Date    CAD (coronary artery disease) 11/10/2016    NSTEMI & 2 stents    Deafness 10/28/2012    DM (diabetes mellitus) ([facility] [address]     Elevated cholesterol     Hypertension     NSTEMI (non-ST elevated myocardial infarction) ([facility] [address] 11/10/2016       PAST SURGICAL HISTORY:  Past Surgical History:   Procedure Laterality Date    COLONOSCOPY N/A 6/28/2018    COLONOSCOPY performed by Kelly Costa MD at [address]  11/11/2016    2 stents       FAMILY HISTORY:  Family History   Problem Relation Age of Onset    Heart Disease Father     Heart Attack Father     Hypertension Mother     Elevated Lipids Brother     Elevated Lipids Brother     No Known Problems Sister     Elevated Lipids Brother     No Known Problems Son     No Known Problems Daughter     Anesth Problems Neg Hx        SOCIAL HISTORY:  Social History     Socioeconomic History    Marital status:    Tobacco Use    Smoking status: Never     Passive exposure: Never    Smokeless tobacco: Never   Vaping Use    Vaping Use: Never used   Substance and Sexual Activity    Alcohol use: Yes     Alcohol/week: 2.0 standard drinks     Types: 1 Cans of beer, 1 Shots of liquor per week     Comment: rarely    Drug use: No    Sexual activity: Yes     Social Determinants of Health     Financial Resource Strain: Medium Risk    Difficulty of Paying Living Expenses: Somewhat hard   Food Insecurity: Food Insecurity Present    Worried About Running Out of Food in the Last Year: Never true    Ran Out of Food in the Last Year: Often true       LABORATORY RESULTS:     Labs Latest Ref Rng & Units 1/9/2023 1/8/2023 1/7/2023 1/6/2023 1/5/2023 1/4/2023 1/4/2023   WBC 4.1 - 11.1 K/uL 9.0 - 7.7 7.9 12. 3(H) - 13. 4(H)   RBC 4.10 - 5.70 M/uL 3.66(L) - 4.42 3.90(L) 4.24 - 4.55   Hemoglobin 12.1 - 17.0 g/dL 8.7(L) - 10. 1(L) 9.0(L) 9.6(L) - 10. 4(L)   Hematocrit 36.6 - 50.3 % 29. 2(L) - 35. 7(L) 31. 3(L) 33. 4(L) - 35. 6(L)   MCV 80.0 - 99.0 FL 79. 8(L) - 80.8 80.3 78. 8(L) - 78. 2(L)   Platelets 525 - 876 K/uL 254 - 273 247 297 - 319   Lymphocytes 12 - 49 % - - 15 11(L) 9(L) - 4(L)   Monocytes 5 - 13 % - - 6 8 7 - 3(L)   Eosinophils 0 - 7 % - - 5 3 2 - 0   Basophils 0 - 1 % - - 1 1 1 - 1   Albumin 3.5 - 5.0 g/dL 2. 9(L) 3.0(L) 3. 1(L) 3.0(L) 3.4(L) - 3.6   Calcium 8.5 - 10.1 MG/DL 9.4 9.1 9.1 9.4 9.9 10. 4(H) 10. 7(H)   Glucose 65 - 100 mg/dL 192(H) 135(H) 110(H) 177(H) 75 - 127(H)   BUN 6 - 20 MG/DL 36(H) 36(H) 40(H) 56(H) 77(H) - 96(H)   Creatinine 0.70 - 1.30 MG/DL 1.27 1.26 1.07 1.21 1.34(H) - 1.57(H)   Sodium 136 - 145 mmol/L 138 138 137 133(L) 133(L) - 135(L)   Potassium 3.5 - 5.1 mmol/L 4.6 4.8 4.4 4.3 4.2 - 4.4   TSH 0.36 - 3.74 uIU/mL - - - - - - -   PSA 0.01 - 4.0 ng/mL - - - - - - -   Some recent data might be hidden     Lab Results   Component Value Date/Time    TSH 2.12 12/27/2022 02:36 PM    TSH 4.80 (H) 12/06/2022 03:53 AM    TSH 5.39 (H) 10/12/2022 09:10 AM    TSH 3.53 02/03/2022 11:47 AM    TSH 5.790 (H) 11/21/2019 04:45 PM    TSH 3.08 06/22/2018 01:53 PM    TSH 4.250 05/26/2015 09:43 AM       ALLERGY:  No Known Allergies     CURRENT MEDICATIONS:    Current Facility-Administered Medications:     insulin lispro (HUMALOG) injection 4 Units, 4 Units, SubCUTAneous, TID WITH MEALS, Cathy Sheldon CNS    insulin lispro (HUMALOG) injection, , SubCUTAneous, AC&HS, Cathy Sheldon CNS    dextrose 10 % infusion 0-250 mL, 0-250 mL, IntraVENous, PRN, Cathy Sheldon CNS    albumin human 25% (BUMINATE) solution 12.5 g, 12.5 g, IntraVENous, ONCE, Saray Pickett MD    [Held by provider] bumetanide (BUMEX) tablet 1 mg, 1 mg, Oral, DAILY, Lizette Linton NP, 1 mg at 01/09/23 5914    spironolactone (ALDACTONE) tablet 12.5 mg, 12.5 mg, Oral, DAILY, Lizette Linton NP, 12.5 mg at 01/09/23 0912    melatonin tablet 3 mg, 3 mg, Oral, QHS PRN, Modesta Gaffney NP, 3 mg at 01/07/23 9590    [Held by provider] DOBUTamine (DOBUTREX) 500 mg/250 mL (2,000 mcg/mL) infusion, 1 mcg/kg/min, IntraVENous, CONTINUOUS, Saray Pickett MD, Last Rate: 1.6 mL/hr at 01/06/23 1137, 1 mcg/kg/min at 01/06/23 1137    empagliflozin (JARDIANCE) tablet 10 mg, 10 mg, Oral, DAILY, Saray Pickett MD, 10 mg at 01/09/23 0911    dextrose 10 % infusion 0-250 mL, 0-250 mL, IntraVENous, PRN, Cathy Sheldon, CNS    insulin glargine (LANTUS) injection 8 Units, 8 Units, SubCUTAneous, QHS, Cathy Sheldon CNS, 8 Units at 01/08/23 2126    albuterol-ipratropium (DUO-NEB) 2.5 MG-0.5 MG/3 ML, 3 mL, Nebulization, Q6H PRN, Sophia Saleh MD    digoxin Kushal Hernandez) tablet 0.125 mg, 0.125 mg, Oral, DAILY, Colleen Astorga, NP, 0.125 mg at 01/09/23 0911    mirtazapine (REMERON) tablet 7.5 mg, 7.5 mg, Oral, QHS, Roger Yang MD, 7.5 mg at 01/08/23 2126    clopidogreL (PLAVIX) tablet 75 mg, 75 mg, Oral, DAILY, Sudha Mccormick, NP, 75 mg at 01/09/23 0913    [Held by provider] potassium chloride SR (KLOR-CON 10) tablet 40 mEq, 40 mEq, Oral, BID, Italo MOLINA MD, 40 mEq at 01/03/23 0838    pantoprazole (PROTONIX) tablet 40 mg, 40 mg, Oral, ACB, [facility], DO, 40 mg at 01/09/23 0729    heparin (porcine) injection 5,000 Units, 5,000 Units, SubCUTAneous, Q12H, [facility], DO, 5,000 Units at 01/09/23 0911    QUEtiapine (SEROquel) tablet 50 mg, 50 mg, Oral, QHS, David Aguilar G, DO, 50 mg at 01/08/23 2126    lidocaine 4 % patch 1 Patch, 1 Patch, TransDERmal, Q24H, Duane Pack MD, 1 Patch at 01/05/23 1556    balsam peru-castor oiL (VENELEX) ointment, , Topical, BID, Lalo Ryan MD, Given at 01/09/23 0913    alteplase (CATHFLO) 1 mg in sterile water (preservative free) 1 mL injection, 1 mg, InterCATHeter, PRN, Duane Pack MD    bacitracin 500 unit/gram packet 1 Packet, 1 Packet, Topical, PRN, Italo MOLINA MD    glucose chewable tablet 16 g, 4 Tablet, Oral, PRN, Italo MOLINA MD    glucagon (GLUCAGEN) injection 1 mg, 1 mg, IntraMUSCular, PRN, Duane Pack MD    senna-docusate (PERICOLACE) 8.6-50 mg per tablet 1 Tablet, 1 Tablet, Oral, BID PRN, Duane Pack MD, 1 Tablet at 12/26/22 5511    bisacodyL (DULCOLAX) suppository 10 mg, 10 mg, Rectal, QHS PRN, Walker Samaniego MD    sodium chloride (NS) flush 5-40 mL, 5-40 mL, IntraVENous, Q8H, Walker Aponte MD, 10 mL at 01/09/23 0729    sodium chloride (NS) flush 5-40 mL, 5-40 mL, IntraVENous, PRN, Italo MOLINA MD, 10 mL at 12/28/22 0845    acetaminophen (TYLENOL) tablet 650 mg, 650 mg, Oral, Q6H PRN, 650 mg at 01/05/23 2234 **OR** acetaminophen (TYLENOL) suppository 650 mg, 650 mg, Rectal, Q6H PRN, Walker Lutz MD    polyethylene glycol (MIRALAX) packet 17 g, 17 g, Oral, DAILY PRN, Tess MOLINA MD, 17 g at 12/26/22 0649    ondansetron (ZOFRAN ODT) tablet 4 mg, 4 mg, Oral, Q8H PRN **OR** ondansetron (ZOFRAN) injection 4 mg, 4 mg, IntraVENous, Q6H PRN, Tess MOLINA MD, 4 mg at 12/24/22 0849    aspirin delayed-release tablet 81 mg, 81 mg, Oral, DAILY, Cielo Mcgee MD, 81 mg at 01/09/23 0911    ipratropium (ATROVENT) 21 mcg (0.03 %) nasal spray 2 Spray, 2 Spray, Both Nostrils, Q12H, Ranjit Pearson MD, 2 [place] at 01/09/23 0915    tamsulosin (FLOMAX) capsule 0.4 mg, 0.4 mg, Oral, DAILY, Benjie Pearson MD, 0.4 mg at 01/09/23 0911    sodium chloride (NS) flush 5-40 mL, 5-40 mL, IntraVENous, PRN, Aura Prather MD    PATIENT CARE TEAM:  Patient Care Team:  Beverley Beach MD as PCP - General (Family Medicine)  Beverley Beach MD as PCP - REHABILITATION [facility]  Arline Quintero MD (Cardiovascular Disease Physician)  Leonie Mancuso MD (Gastroenterology)  Janny Barrett MD (Cardiothoracic Surgery)  Saul Clark MD (Cardiovascular Disease Physician)  Mort Cowden, MD (Nephrology)  Trudy Mccormick RN as Care Transitions Nurse  Josh Navarro MD (Pulmonary Disease)     Thank you for allowing me to participate in this patient's care.     Myesha Pérez MD   [address] [place] [address], [address]  Phone: [phone]

## 2023-01-09 NOTE — PROGRESS NOTES
Cardiac Cath Lab Procedure Area Arrival Note:    Rosibel Locke arrived to Cardiac Cath Lab, Procedure Area. Patient identifiers verified with NAME and DATE OF BIRTH. Procedure verified with patient. Consent forms verified. Allergies verified. Patient informed of procedure and plan of care. Questions answered with review. Patient voiced understanding of procedure and plan of care. Patient on cardiac monitor, non-invasive blood pressure, SPO2 monitor. On room air. IV of normal saline on pump at 25 ml/hr. Patient status doing well without problems. Patient is A&Ox 4. Patient reports no pain. Patient medicated during procedure with orders obtained and verified by Dr. Rhonda Patel. Refer to patients Cardiac Cath Lab PROCEDURE REPORT for vital signs, assessment, status, and response during procedure, printed at end of case. Printed report on chart or scanned into chart.

## 2023-01-09 NOTE — DIABETES MGMT
[address] [place] [address]  DIABETES MANAGEMENT CONSULT    Consulted by  Johann Cohn MD   for advanced nursing evaluation and care for inpatient blood glucose management. Evaluation and Action Plan   Rosibel Locke is a 71year old gentleman, with Type 2 Diabetes with a recent A1C of 6.5%, who was admitted with acute on chronic HFrEF and cardiogenic shock. He was admitted 2 weeks prior for similar complaint and antihyperglycemic agents were adjusted on discharge. Metformin was stopped due to decline in GFR and Jardiance switched to Roby (unclear why). He was compliant with his Yumiko Bourdon, Roby and Glipizide up until day prior to this hospitalization. His glucose was significantly elevated at 458 on admission, s/t insulin resistance from acute HFrEF, elevated lactate and impaired perfusion. Low dose basal insulin was started and sufficient to control BG. There was a suspicion for myocarditis and hydrocortisone 50mg twice daily was started. Basal insulin was escalated to 48 units but with an exaggerated post-prandial glucose spike to over 400 daily- related to steroids in the post-prandial state and high carb containing nutritional shakes. Steroids weaned to off and basal now at baseline- 8 units daily. 10mg Jardiance daily was started for heart failure management. Despite Jardiance and Glargine, he continued to have pre-prandial hyperglycemia this weekend and bolus insulin resumed. His fasting BG is below goal-77 on am labs- and will reduce basal insulin. Inpatient BG goal is 140-180mg/dl.     Management Rationale Action Plan   Medication   Basal needs Diabetes: 0.1 units/kg   Reduced to 6 units Lantus HS   Nutritional needs Using moderate sensitivity based on   oral intake and degree of pre-prandial   hyperglycemia off steroids 4 units Humalog/meal    Hold if patient is NPO or consumes less than 50% of carbohydrates on meal tray   Corrective insulin Using high sensitivity now off steroids HIGH Sensitivity ACHS   Additional Recommendations. POC glucose ACHS    Consistent carbohydrate diet (60 grams CHO/meal). Appreciate RD help with adjustments. Jardiance per [facility]     Discharge Planning:   Continue Januvia at PTA dose  Adjust Glipizide to 5 mg once daily  SGLT2 per HF team: Has new Rx for Reginold Wendy, was switched from [address] at last hospital admission  [facility] with PCP for ongoing routine diabetes care  Patient to check glucose before meals and bedtime. Make a log and present to PCP for interpretation. Initial Presentation   Minna Acosta is a 71 y.o. male who presented to the ED 12/22/22 with a 3-4 day c/o gradually worsening dyspnea, fatigue, chills, constipation and RLQ pain. He endorses difficulty voiding despite compliance with lasix. In the ED, he was hypoxic and started on supplemental O2 and diuresis. LAB: WBC 25.1, , GFR 25, Lac 5.2, Trop 1096, BNP 6905  CXR: Widespread interstitial opacities bilateral mid to lower lung opacities  concerning for edema and/or atypical infection. Small bilateral pleural  effusions.   CT: CT revealed distended bladder  EKG: Sinus tachycardia   Biatrial enlargement   Rightward axis   Marked ST abnormality, possible inferior subendocardial injury   Marked ST abnormality, possible lateral subendocardial injury   Abnormal ECG     Did have a hospital admission for HFrEF 12/11/22-12/16/22    HX:   Past Medical History:   Diagnosis Date    CAD (coronary artery disease) 11/10/2016    NSTEMI & 2 stents    Deafness 10/28/2012    DM (diabetes mellitus) ([facility]     Elevated cholesterol     Hypertension     NSTEMI (non-ST elevated myocardial infarction) ([facility] 11/10/2016    CKD  CAD with CABG 2018    INITIAL DX:   Sepsis ([facility] [A41.9]     Current Treatment     TX: IVF, Diuresis, Supplemental O2    Hospital Course   Clinical progress has been complicated by:   68/78: Initial admission with hospitalist service but with progressive hypoxia early requiring CCU transfer. Elevated troponin/BNP. BPH and hydronephrosis. Pt pan cultured, given dose of abx and dose of IVP lasix. Pt placed on BiPAP w/ improvement in oxygenation. 12/23: Seen by urology for bilateral hydronephrosis related to distended bladder. Maintain urinary catheter. 12/24: CVL placement   12/26: Decreased urine output, started on bumex and dobutamine gtt. Cardiology consult. Continue inotrope. 12/27: Advanced Heart Failure Consult. Started HF evaluation. Nephrology consult: Decreased bumex gtt  12/28: Mini pulse steroids per cardiology for treatment of myocarditis. Insulin gtt. 12/30: Transferred to hospitalist service. 1/3: Cardiac MRI: Ischemic CM  1/6: Steroids complete. Leotha Fitch started  1/9: Right heart cath: Normal to low filling pressures but mildly reduced CI. CI of 2 l/min/m2 off dobutamine  Diabetes History   Type 2 Diabetes  Ambulatory BG management provided by: PCP Louis Urias MD    Diabetes-related Medical History  Acute complications  Acute hyperglycemia  Neurological complications  Peripheral neuropathy  Microvascular disease  Nephropathy  Macrovascular disease  CAD  Other associated conditions     CHF    Diabetes Medication History  Key Antihyperglycemic Medications               dapagliflozin (FARXIGA) 10 mg tab tablet (Taking) Take 1 Tablet by mouth daily.     glipiZIDE (GLUCOTROL) 5 mg tablet (Taking) Take 1 tablet by mouth twice daily    Januvia 50 mg tablet (Taking) Take 1 tablet by mouth once daily             Diabetes self-management practices:   Eating pattern   Eats 3 small meals daily  [x] Breakfast  2 Eggs, Coffee  [x] Lunch   Sagle  [x] Dinner   \"H[place]er Food\" Bread, rice, lentils   [x] Bedtime  [place]  [x] Snacks   Afternoon snack  [x] Beverages  Water, Coffee  Physical activity pattern   Sedentary   Monitoring pattern  Does not check BG  Taking medications pattern  [x] Consistent administration  [x] Affordable  Social determinants of health impacting diabetes self-management practices   Concerned that you need to know more about how to stay healthy with diabetes  Overall evaluation:    [x] Achieving A1c target with drug therapy & self-care practices    Subjective   I feel good today. Jane Maynard     Objective   Physical exam  General Underweight male in mild distress/ill-appearing. Conversant and cooperative  Neuro  Alert, oriented   Vital Signs Visit Vitals  /62 (BP 1 Location: Right upper arm, BP Patient Position: Supine)   Pulse 94   Temp 98.1 °F (36.7 °C)   Resp 17   Ht 5' 9\" (1.753 m)   Wt 53.2 kg (117 lb 4.6 oz)   SpO2 100%   BMI 17.32 kg/m²     Skin  Warm and dry. Acanthosis noted along neckline. No lipohypertrophy or lipoatrophy noted at injection sites   Heart   Regular rate and rhythm. 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        Blood glucose pattern    Significant diabetes-related events over the past 24-72 hours  A1C  6.5% 22 (down from 7.0 10/12/22)  Fasting B on 3am labs, 105 on 7a POC check   Pre-prandial: 169-243  Basal: Lantus 8 units HS  Bolus: 4 units in the last 24h  Correction: 1 unit in the last 24h  Jardiance 10mg daily    Assessment and Nursing Intervention   Nursing Diagnosis Risk for unstable blood glucose pattern   Nursing Intervention Domain 5250 Decision-making Support   Nursing Interventions Examined current inpatient diabetes/blood glucose control   Explored factors facilitating and impeding inpatient management  Explored corrective strategies with patient and responsible inpatient provider   Informed patient of rational for insulin strategy while hospitalized     Nursing Diagnosis 00901 Ineffective Health Management   Nursing Intervention Domain 5258 Decision-making Support   Nursing Interventions Identified diabetes self-management practices impeding diabetes control  Discussed diabetes survival skills related to  Healthy Plate eating plan; given handouts  Role of physical activity in improving insulin sensitivity and action  Procedure for blood glucose monitoring & options for low-cost products  Medications plan at discharge     Billing Code(s)   [x] [phone]    Before making these care recommendations, I personally reviewed the hospitalization record, including notes, laboratory & diagnostic data and current medications, and examined the patient at the bedside (circumstances permitting) before making care recommendations. More than fifty (50) percent of the time was spent in patient counseling and/or care coordination.   Total minutes: 25    SLICK Guadalupe  Diabetes Clinical Nurse Specialist  [facility]  Access via [employer]

## 2023-01-09 NOTE — PROGRESS NOTES
Occupational Therapy:     Reviewed chart and noted pt is currently EVAN. Will defer at this time and continue to follow.      Thank you,   MARJAN Wilkinson, OTR/L

## 2023-01-09 NOTE — PROGRESS NOTES
Transitions of Care Plan  RUR: 24% - high  Clinical Update: cardiac cath today; off dobutamine; echo  Consults: [facility]; Therapy; Cardiology  Baseline: independent; resides w wife  Barriers to Discharge: medical  Disposition:  Home Health vs SNF - patient has progressed well over the past few days  Estimated Discharge Date: 01/11/23    CM reviewed chart for clinical update. Participated in [address] [place] [address]. Patient does not have qualifying dx for acute rehab per [employer]. Anticipate home w home health vs SNF for discharge. CM spoke briefly w therapy - patient progressing well and could work towards home - formal recommendation to follow after cardiac cath. Patient off dobutamine -   SNF choices:  [facility], [facility], and [address] [place] [address]: patient open w [facility] - will need IRMA orders for discharge    CM to speak w treatment team and family after next therapy session to determine best disposition plan. CM will continue to follow.     Taniya Chiang, MPH  Care Manager [facility]  Available via [employer] or

## 2023-01-10 LAB
ALBUMIN SERPL-MCNC: 2.9 G/DL (ref 3.5–5)
ALBUMIN/GLOB SERPL: 0.8 (ref 1.1–2.2)
ALP SERPL-CCNC: 192 U/L (ref 45–117)
ALT SERPL-CCNC: 79 U/L (ref 12–78)
ANION GAP SERPL CALC-SCNC: 8 MMOL/L (ref 5–15)
AST SERPL-CCNC: 42 U/L (ref 15–37)
BILIRUB SERPL-MCNC: 0.4 MG/DL (ref 0.2–1)
BNP SERPL-MCNC: 6302 PG/ML
BUN SERPL-MCNC: 30 MG/DL (ref 6–20)
BUN/CREAT SERPL: 26 (ref 12–20)
CALCIUM SERPL-MCNC: 9.3 MG/DL (ref 8.5–10.1)
CHLORIDE SERPL-SCNC: 104 MMOL/L (ref 97–108)
CO2 SERPL-SCNC: 24 MMOL/L (ref 21–32)
CREAT SERPL-MCNC: 1.14 MG/DL (ref 0.7–1.3)
DIGOXIN SERPL-MCNC: 1.1 NG/ML (ref 0.9–2)
GLOBULIN SER CALC-MCNC: 3.7 G/DL (ref 2–4)
GLUCOSE BLD STRIP.AUTO-MCNC: 105 MG/DL (ref 65–117)
GLUCOSE BLD STRIP.AUTO-MCNC: 164 MG/DL (ref 65–117)
GLUCOSE BLD STRIP.AUTO-MCNC: 167 MG/DL (ref 65–117)
GLUCOSE BLD STRIP.AUTO-MCNC: 237 MG/DL (ref 65–117)
GLUCOSE SERPL-MCNC: 77 MG/DL (ref 65–100)
MAGNESIUM SERPL-MCNC: 2.1 MG/DL (ref 1.6–2.4)
PHOSPHATE SERPL-MCNC: 3.5 MG/DL (ref 2.6–4.7)
POTASSIUM SERPL-SCNC: 4.3 MMOL/L (ref 3.5–5.1)
PROT SERPL-MCNC: 6.6 G/DL (ref 6.4–8.2)
SERVICE CMNT-IMP: ABNORMAL
SERVICE CMNT-IMP: NORMAL
SODIUM SERPL-SCNC: 136 MMOL/L (ref 136–145)

## 2023-01-10 PROCEDURE — 74011250637 HC RX REV CODE- 250/637: Performed by: INTERNAL MEDICINE

## 2023-01-10 PROCEDURE — 74011250636 HC RX REV CODE- 250/636: Performed by: NURSE PRACTITIONER

## 2023-01-10 PROCEDURE — 74011636637 HC RX REV CODE- 636/637: Performed by: CLINICAL NURSE SPECIALIST

## 2023-01-10 PROCEDURE — 80162 ASSAY OF DIGOXIN TOTAL: CPT

## 2023-01-10 PROCEDURE — 74011250637 HC RX REV CODE- 250/637: Performed by: NURSE PRACTITIONER

## 2023-01-10 PROCEDURE — 65660000001 HC RM ICU INTERMED STEPDOWN

## 2023-01-10 PROCEDURE — 99233 SBSQ HOSP IP/OBS HIGH 50: CPT | Performed by: NURSE PRACTITIONER

## 2023-01-10 PROCEDURE — 82962 GLUCOSE BLOOD TEST: CPT

## 2023-01-10 PROCEDURE — 83735 ASSAY OF MAGNESIUM: CPT

## 2023-01-10 PROCEDURE — 74011000250 HC RX REV CODE- 250: Performed by: STUDENT IN AN ORGANIZED HEALTH CARE EDUCATION/TRAINING PROGRAM

## 2023-01-10 PROCEDURE — 97530 THERAPEUTIC ACTIVITIES: CPT

## 2023-01-10 PROCEDURE — 74011250636 HC RX REV CODE- 250/636: Performed by: STUDENT IN AN ORGANIZED HEALTH CARE EDUCATION/TRAINING PROGRAM

## 2023-01-10 PROCEDURE — 97116 GAIT TRAINING THERAPY: CPT

## 2023-01-10 PROCEDURE — 74011250637 HC RX REV CODE- 250/637: Performed by: FAMILY MEDICINE

## 2023-01-10 PROCEDURE — 80053 COMPREHEN METABOLIC PANEL: CPT

## 2023-01-10 PROCEDURE — 36415 COLL VENOUS BLD VENIPUNCTURE: CPT

## 2023-01-10 PROCEDURE — 74011250637 HC RX REV CODE- 250/637: Performed by: STUDENT IN AN ORGANIZED HEALTH CARE EDUCATION/TRAINING PROGRAM

## 2023-01-10 PROCEDURE — 83880 ASSAY OF NATRIURETIC PEPTIDE: CPT

## 2023-01-10 PROCEDURE — 99231 SBSQ HOSP IP/OBS SF/LOW 25: CPT | Performed by: CLINICAL NURSE SPECIALIST

## 2023-01-10 PROCEDURE — P9045 ALBUMIN (HUMAN), 5%, 250 ML: HCPCS | Performed by: NURSE PRACTITIONER

## 2023-01-10 PROCEDURE — 84100 ASSAY OF PHOSPHORUS: CPT

## 2023-01-10 RX ORDER — INSULIN GLARGINE 100 [IU]/ML
6 INJECTION, SOLUTION SUBCUTANEOUS
Status: DISCONTINUED | OUTPATIENT
Start: 2023-01-10 | End: 2023-01-17

## 2023-01-10 RX ORDER — ALBUMIN HUMAN 50 G/1000ML
12.5 SOLUTION INTRAVENOUS ONCE
Status: COMPLETED | OUTPATIENT
Start: 2023-01-10 | End: 2023-01-11

## 2023-01-10 RX ADMIN — IPRATROPIUM BROMIDE 2 SPRAY: 21 SPRAY, METERED NASAL at 23:00

## 2023-01-10 RX ADMIN — Medication: at 17:22

## 2023-01-10 RX ADMIN — HEPARIN SODIUM 5000 UNITS: 5000 INJECTION INTRAVENOUS; SUBCUTANEOUS at 08:47

## 2023-01-10 RX ADMIN — Medication 4 UNITS: at 12:58

## 2023-01-10 RX ADMIN — ASPIRIN 81 MG: 81 TABLET, COATED ORAL at 08:47

## 2023-01-10 RX ADMIN — Medication 1 UNITS: at 21:38

## 2023-01-10 RX ADMIN — SODIUM CHLORIDE, PRESERVATIVE FREE 5 ML: 5 INJECTION INTRAVENOUS at 14:28

## 2023-01-10 RX ADMIN — MIRTAZAPINE 7.5 MG: 15 TABLET, FILM COATED ORAL at 21:28

## 2023-01-10 RX ADMIN — Medication 4 UNITS: at 17:22

## 2023-01-10 RX ADMIN — CLOPIDOGREL BISULFATE 75 MG: 75 TABLET ORAL at 08:47

## 2023-01-10 RX ADMIN — SODIUM CHLORIDE, PRESERVATIVE FREE 10 ML: 5 INJECTION INTRAVENOUS at 07:08

## 2023-01-10 RX ADMIN — EMPAGLIFLOZIN 10 MG: 10 TABLET, FILM COATED ORAL at 08:47

## 2023-01-10 RX ADMIN — SPIRONOLACTONE 12.5 MG: 25 TABLET ORAL at 08:50

## 2023-01-10 RX ADMIN — ALBUMIN (HUMAN) 12.5 G: 12.5 INJECTION, SOLUTION INTRAVENOUS at 14:36

## 2023-01-10 RX ADMIN — PANTOPRAZOLE SODIUM 40 MG: 40 TABLET, DELAYED RELEASE ORAL at 07:08

## 2023-01-10 RX ADMIN — Medication 3 MG: at 21:44

## 2023-01-10 RX ADMIN — SODIUM CHLORIDE, PRESERVATIVE FREE 10 ML: 5 INJECTION INTRAVENOUS at 23:00

## 2023-01-10 RX ADMIN — HEPARIN SODIUM 5000 UNITS: 5000 INJECTION INTRAVENOUS; SUBCUTANEOUS at 21:27

## 2023-01-10 RX ADMIN — IPRATROPIUM BROMIDE 2 SPRAY: 21 SPRAY, METERED NASAL at 08:48

## 2023-01-10 RX ADMIN — INSULIN GLARGINE 6 UNITS: 100 INJECTION, SOLUTION SUBCUTANEOUS at 21:38

## 2023-01-10 RX ADMIN — DIGOXIN 0.12 MG: 125 TABLET ORAL at 08:47

## 2023-01-10 RX ADMIN — TAMSULOSIN HYDROCHLORIDE 0.4 MG: 0.4 CAPSULE ORAL at 08:47

## 2023-01-10 RX ADMIN — Medication 4 UNITS: at 08:47

## 2023-01-10 RX ADMIN — Medication: at 08:48

## 2023-01-10 RX ADMIN — QUETIAPINE FUMARATE 50 MG: 25 TABLET ORAL at 21:28

## 2023-01-10 NOTE — PROGRESS NOTES
1930 Bedside shift change report given to 1200 Fco Mirza (oncoming nurse) by Josseline Whatley (offgoing nurse). Report included the following information SBAR, Kardex, Intake/Output, MAR, Recent Results, and Cardiac Rhythm NSR .     0730 Bedside shift change report given to Chelle (oncoming nurse) by 1200 Fco Mirza (offgoing nurse). Report included the following information SBAR, Kardex, Intake/Output, MAR, Recent Results, and Cardiac Rhythm NSR . Problem: Patient Education: Go to Patient Education Activity  Goal: Patient/Family Education  Outcome: Progressing Towards Goal     Problem: Falls - Risk of  Goal: *Absence of Falls  Description: Document Mike Russell Fall Risk and appropriate interventions in the flowsheet.   Outcome: Progressing Towards Goal  Note: Fall Risk Interventions:  Mobility Interventions: Assess mobility with egress test, Communicate number of staff needed for ambulation/transfer, Patient to call before getting OOB         Medication Interventions: Assess postural VS orthostatic hypotension, Evaluate medications/consider consulting pharmacy, Patient to call before getting OOB    Elimination Interventions: Call light in reach, Patient to call for help with toileting needs    History of Falls Interventions: Door open when patient unattended, Evaluate medications/consider consulting pharmacy         Problem: Patient Education: Go to Patient Education Activity  Goal: Patient/Family Education  Outcome: Progressing Towards Goal     Problem: Patient Education: Go to Patient Education Activity  Goal: Patient/Family Education  Outcome: Progressing Towards Goal     Problem: Heart Failure: Discharge Outcomes  Goal: *Demonstrates ability to perform prescribed activity without shortness of breath or discomfort  Outcome: Progressing Towards Goal  Goal: *Left ventricular function assessment completed prior to or during stay, or planned for post-discharge  Outcome: Progressing Towards Goal  Goal: *ACEI prescribed if LVEF less than 40% and no contraindications or ARB prescribed  Outcome: Progressing Towards Goal  Goal: *Verbalizes understanding and describes prescribed diet  Outcome: Progressing Towards Goal  Goal: *Verbalizes understanding/describes prescribed medications  Outcome: Progressing Towards Goal  Goal: *Describes available resources and support systems  Description: (eg: Home Health, Palliative Care, Advanced Medical Directive)  Outcome: Progressing Towards Goal  Goal: *Describes smoking cessation resources  Outcome: Progressing Towards Goal  Goal: *Understands and describes signs and symptoms to report to providers(Stroke Metric)  Outcome: Progressing Towards Goal  Goal: *Describes/verbalizes understanding of follow-up/return appt  Description: (eg: to physicians, diabetes treatment coordinator, and other resources  Outcome: Progressing Towards Goal  Goal: *Describes importance of continuing daily weights and changes to report to physician  Outcome: Progressing Towards Goal     Problem: Discharge Planning  Goal: *Discharge to safe environment  Outcome: Progressing Towards Goal  Goal: *Knowledge of medication management  Outcome: Progressing Towards Goal  Goal: *Knowledge of discharge instructions  Outcome: Progressing Towards Goal     Problem: Patient Education: Go to Patient Education Activity  Goal: Patient/Family Education  Outcome: Progressing Towards Goal     Problem: Pressure Injury - Risk of  Goal: *Prevention of pressure injury  Description: Document Mike Scale and appropriate interventions in the flowsheet.   Outcome: Progressing Towards Goal  Note: Pressure Injury Interventions:  Sensory Interventions: Assess changes in LOC, Assess need for specialty bed, Avoid rigorous massage over bony prominences, Check visual cues for pain, Discuss PT/OT consult with provider, Float heels, Keep linens dry and wrinkle-free, Maintain/enhance activity level, Pressure redistribution bed/mattress (bed type)    Moisture Interventions: Absorbent underpads, Assess need for specialty bed, Check for incontinence Q2 hours and as needed, Internal/External urinary devices, Limit adult briefs, Minimize layers    Activity Interventions: Assess need for specialty bed, Increase time out of bed, Pressure redistribution bed/mattress(bed type), PT/OT evaluation    Mobility Interventions: Assess need for specialty bed, Pressure redistribution bed/mattress (bed type), PT/OT evaluation    Nutrition Interventions: Document food/fluid/supplement intake    Friction and Shear Interventions: Lift sheet, Minimize layers, Foam dressings/transparent film/skin sealants                Problem: Patient Education: Go to Patient Education Activity  Goal: Patient/Family Education  Outcome: Progressing Towards Goal

## 2023-01-10 NOTE — PROGRESS NOTES
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, LVEF 25-30%, stage C, NYHA class IIIA  Unable to up-titrate GDMT for HF due to hypotension and orthostasis likely due to overdiuresis; now weaning dobutamine gtt as BP, CrCl, pro-NT-BNP and lactic improves with holding diuretics; still dry by RHC today with index 1.8  Most likely etiology of cardiomyopathy: CAD +/- TRISTAN +/- inappropriate sinus tach +/- undergoing workup (cardiac MRI with ischemic CM, PYP equivocal, gammopathy and genetics pending)  Patient and family are reviewing LVAD information to make a decision on pursuing VAD evaluation       RECOMMENDATIONS:  Orthostatics daily  Albumin x 1 today   Consider milrinone once euvolemic  Albumin today   Consider Bbrx when orthostasis resolved   Once orthosthasis resolves then will start ARB/ARNi  Continue Spironolactone 12.5mg daily  Continue jardiance 10mg daily  6 minute walk- will need PT  MOCA from 22, consistent with mild cognitive impairment  Inpatient sleep medicine consult pending (high risk for TRISTAN)  Continue digoxin 0.125mcg daily, check digoxin level daily (today 1.0 goal 0.7-1.2)  NOTE: insulin needs adjustments with wean of steroids/jardiance  Continue baby aspirin and plavix  DNR  Physical therapy/ambulate daily     All other care per primary team,      IMPRESSION:  Acute on chronic combined systolic/diastolic  heart failure  Stage D, NYHA class IV symptoms  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, LVEF 25-30% and 23% (by cMRI 1/3/23)  Undergoing evaluation for cardiac amyloidosis with cMRI  Coronary artery disease  CAD s/p CABG x 2: further disease best managed medically due to small vessel size   Peripheral arterial disease  Bilateral hydronephrosis s/p donnelly  Cardiac risk factors:  HTN  HL  TRISTAN, STOP-BANG 4  DM2  CKD, stage 3      INTERVAL EVENTS:  NAEO  Ambulating well  Improved appetite   Afebrile  -130s/50s, HR 80-90s  I/O net neg 2.8L  Cr 1.14  K 4.9  Alb 2.9  T Bili stable  RHC- low filling pressures, low CI 2.0        LIFE GOALS:  Lifestyle goals reviewed with the patient. Patient's personal goals include: TBD  Important upcoming milestones or family events: TBD  The patient identifies the following as important for living well: TBD     Patient assessed. High suspicion of sleep apnea due to the following reasons. Will refer for sleep evaluation. [x] Heart Failure  [] Hypertention  [x] Atrial Fibrillation  [] BMI > 30  [] History of stroke  [] Diabetes  [x] Heavy snoring  [] Witnessed apnea  [] Hypoxemia                    HPI:  71 y.o. male who was admitted via ED for worsening SOB, poor appetite, orthopnea and fatigue. Pmhx of CAD s/p CABG, PAD, DM 2, recent admission for HFmrEF earlier this month. Serial TTEs show progressive decline in EF since 3/2021 with the most recent EF at 25-30%. Recent LHC shows diffuse CAD and no interventions indicated. Patient had Virginia Golas recently and there is some thought to myocarditis or other etiology causing worsening HF. [facility] was consulted for further evaluation and management of HFrEF. CARDIAC IMAGING:  Echo 1/9/23   Left Ventricle: Severely reduced left ventricular systolic function with a visually estimated EF of 25 - 30%. Left ventricle size is normal. Mildly increased wall thickness. Echo 12/26/22    Left Ventricle: Severely reduced left ventricular systolic function with a visually estimated EF of 25 - 30%. Left ventricle size is normal. Normal wall thickness. There are regional wall motion abnormalities. Grade II diastolic dysfunction with increased LAP. Right Ventricle: Moderately reduced systolic function. TAPSE is abnormal. TAPSE is 1.1 cm. Aortic Valve: Mild stenosis of the aortic valve. AV peak gradient is 13 mmHg. AV peak velocity is 1.8 m/s. Mitral Valve: Not well visualized. Moderate annular calcification at the posterior leaflet of the mitral valve. Mild to moderate regurgitation. Tricuspid Valve: Mildly elevated RVSP. Left Atrium: Left atrium is moderately dilated. 12/8/22    Left Ventricle: Moderately reduced left ventricular systolic function with a visually estimated EF of 35 - 40%. Severe hypokinesis of the following segments: mid anteroseptal, apical anterior, apical septal, apical inferior and apical lateral. Severe hypokinesis of the apex. Mitral Valve: Severely thickened leaflet, at the anterior and posterior leaflets. Severely calcified leaflet, at the anterior and posterior leaflets. Mild annular calcification of the mitral valve. Moderate regurgitation. Left Atrium: Left atrium is mildly dilated. Contrast used: Definity. limited study     EKG 12/22/22 ST, Biatria enlargement, marked ST abnormality     C 12/6/22  1. Normal LVEDP  2. Severe native multivessel coronary artery disease  3. Patent LIMA to LAD and vein graft to distal RCA  4. Recurrent ISR in OM1 stent with now 60 to 70% restenosis  5. Recoil of left main and circumflex stent with now recurrent 40 to 50% stenosis. 6.  Progression of ostial left main disease now to about 60% stenosis  7. Progression of disease in jailed first marginal branch now with diffuse 90% stenosis  8.   High-grade stenosis in the mid to distal right potential femoral artery treated with 6 x 40 mm impact drug-coated balloon angioplasty to reduce the stenosis to less than 40%     NST        HEMODYNAMICS:  RHC 1/9/23  PA 20/9, RA 3, PCWP 8, CI 1.8    CPEST too ill   6MW pending        PHYSICAL EXAM:  Visit Vitals  BP (!) 104/50 (BP 1 Location: Left upper arm, BP Patient Position: At rest)   Pulse 89   Temp 98.1 °F (36.7 °C)   Resp 16   Ht 5' 9\" (1.753 m)   Wt 114 lb 3.2 oz (51.8 kg)   SpO2 98%   BMI 16.86 kg/m²     Physical Exam  Vitals and nursing note reviewed. Constitutional:       Appearance: Normal appearance. He is underweight. Cardiovascular:      Rate and Rhythm: Normal rate and regular rhythm. Pulses: Normal pulses. Heart sounds: Normal heart sounds. Pulmonary:      Effort: No respiratory distress. Breath sounds: Normal breath sounds. Abdominal:      General: There is no distension. Palpations: Abdomen is soft. Musculoskeletal:         General: No swelling. Skin:     General: Skin is warm and dry. Neurological:      General: No focal deficit present. Mental Status: He is alert and oriented to person, place, and time. Psychiatric:         Mood and Affect: Mood normal.        REVIEW OF SYSTEMS:  Review of Systems   Constitutional:  Negative for chills, fever and malaise/fatigue. Respiratory:  Negative for cough and shortness of breath. Cardiovascular:  Negative for chest pain, palpitations and leg swelling. Gastrointestinal:  Negative for constipation, heartburn, nausea and vomiting. Musculoskeletal:  Negative for falls and myalgias. Neurological:  Negative for dizziness and headaches. Psychiatric/Behavioral:  Negative for depression.         PAST MEDICAL HISTORY:  Past Medical History:   Diagnosis Date    CAD (coronary artery disease) 11/10/2016    NSTEMI & 2 stents    Deafness 10/28/2012    DM (diabetes mellitus) ([facility] [address]     Elevated cholesterol     Hypertension     NSTEMI (non-ST elevated myocardial infarction) ([facility] [address] 11/10/2016       PAST SURGICAL HISTORY:  Past Surgical History:   Procedure Laterality Date    COLONOSCOPY N/A 6/28/2018    COLONOSCOPY performed by Alphonso Pelayo MD at [address]  11/11/2016    2 stents       FAMILY HISTORY:  Family History   Problem Relation Age of Onset    Heart Disease Father     Heart Attack Father     Hypertension Mother     Elevated Lipids Brother     Elevated Lipids Brother     No Known Problems Sister     Elevated Lipids Brother     No Known Problems Son     No Known Problems Daughter     Anesth Problems Neg Hx        SOCIAL HISTORY:  Social History     Socioeconomic History    Marital status:    Tobacco Use    Smoking status: Never     Passive exposure: Never    Smokeless tobacco: Never   Vaping Use    Vaping Use: Never used   Substance and Sexual Activity    Alcohol use: Yes     Alcohol/week: 2.0 standard drinks     Types: 1 Cans of beer, 1 Shots of liquor per week     Comment: rarely    Drug use: No    Sexual activity: Yes     Social Determinants of Health     Financial Resource Strain: Medium Risk    Difficulty of Paying Living Expenses: Somewhat hard   Food Insecurity: Food Insecurity Present    Worried About Running Out of Food in the Last Year: Never true    Ran Out of Food in the Last Year: Often true       LABORATORY RESULTS:     Labs Latest Ref Rng & Units 1/10/2023 1/9/2023 1/8/2023 1/7/2023 1/6/2023 1/5/2023 1/4/2023   WBC 4.1 - 11.1 K/uL - 9.0 - 7.7 7.9 12. 3(H) -   RBC 4.10 - 5.70 M/uL - 3.66(L) - 4.42 3.90(L) 4.24 -   Hemoglobin 12.1 - 17.0 g/dL - 8. 7(L) - 10. 1(L) 9.0(L) 9.6(L) -   Hematocrit 36.6 - 50.3 % - 29. 2(L) - 35. 7(L) 31. 3(L) 33. 4(L) -   MCV 80.0 - 99.0 FL - 79. 8(L) - 80.8 80.3 78. 8(L) -   Platelets 338 - 768 K/uL - 254 - 273 247 297 -   Lymphocytes 12 - 49 % - - - 15 11(L) 9(L) -   Monocytes 5 - 13 % - - - 6 8 7 -   Eosinophils 0 - 7 % - - - 5 3 2 -   Basophils 0 - 1 % - - - 1 1 1 -   Albumin 3.5 - 5.0 g/dL 2. 9(L) 2. 9(L) 3.0(L) 3. 1(L) 3.0(L) 3.4(L) -   Calcium 8.5 - 10.1 MG/DL 9.3 9.4 9.1 9.1 9.4 9.9 10. 4(H)   Glucose 65 - 100 mg/dL 77 192(H) 135(H) 110(H) 177(H) 75 -   BUN 6 - 20 MG/DL 30(H) 36(H) 36(H) 40(H) 56(H) 77(H) -   Creatinine 0.70 - 1.30 MG/DL 1.14 1.27 1.26 1.07 1.21 1.34(H) -   Sodium 136 - 145 mmol/L 136 138 138 137 133(L) 133(L) -   Potassium 3.5 - 5.1 mmol/L 4.3 4.6 4.8 4.4 4.3 4.2 -   TSH 0.36 - 3.74 uIU/mL - - - - - - -   PSA 0.01 - 4.0 ng/mL - - - - - - -   Some recent data might be hidden     Lab Results   Component Value Date/Time    TSH 2.12 12/27/2022 02:36 PM    TSH 4.80 (H) 12/06/2022 03:53 AM    TSH 5.39 (H) 10/12/2022 09:10 AM    TSH 3.53 02/03/2022 11:47 AM    TSH 5.790 (H) 11/21/2019 04:45 PM    TSH 3.08 06/22/2018 01:53 PM    TSH 4.250 05/26/2015 09:43 AM       ALLERGY:  No Known Allergies     CURRENT MEDICATIONS:    Current Facility-Administered Medications:     insulin lispro (HUMALOG) injection 4 Units, 4 Units, SubCUTAneous, TID WITH MEALS, Cathy Sheldon CNS, 4 Units at 01/09/23 1727    insulin lispro (HUMALOG) injection, , SubCUTAneous, AC&HS, Cathy Sheldon CNS, 1 Units at 01/09/23 2151    dextrose 10 % infusion 0-250 mL, 0-250 mL, IntraVENous, PRN, Cathy Sheldon CNS    [Held by provider] bumetanide (BUMEX) tablet 1 mg, 1 mg, Oral, DAILY, Shaila, Lizette B, NP, 1 mg at 01/09/23 6994    spironolactone (ALDACTONE) tablet 12.5 mg, 12.5 mg, Oral, DAILY, Shaila, Lizette B, NP, 12.5 mg at 01/09/23 0912    melatonin tablet 3 mg, 3 mg, Oral, QHS PRN, Modesta Pratt NP, 3 mg at 01/07/23 0010    [Held by provider] DOBUTamine (DOBUTREX) 500 mg/250 mL (2,000 mcg/mL) infusion, 1 mcg/kg/min, IntraVENous, CONTINUOUS, Deisy Stein MD, Last Rate: 1.6 mL/hr at 01/06/23 1137, 1 mcg/kg/min at 01/06/23 1137    empagliflozin (JARDIANCE) tablet 10 mg, 10 mg, Oral, DAILY, Deisy Stein MD, 10 mg at 01/09/23 0911    dextrose 10 % infusion 0-250 mL, 0-250 mL, IntraVENous, PRN, Cathy Sheldon, CNS    insulin glargine (LANTUS) injection 8 Units, 8 Units, SubCUTAneous, QHS, Cathy Sheldon CNS, 8 Units at 01/09/23 2151    albuterol-ipratropium (DUO-NEB) 2.5 MG-0.5 MG/3 ML, 3 mL, Nebulization, Q6H PRN, Marcio Maldonado MD    digoxin (LANOXIN) tablet 0.125 mg, 0.125 mg, Oral, DAILY, Colleen Astorga, NP, 0.125 mg at 01/09/23 0911    mirtazapine (REMERON) tablet 7.5 mg, 7.5 mg, Oral, QHS, Nima Cm Prakash MD, 7.5 mg at 01/09/23 2152    clopidogreL (PLAVIX) tablet 75 mg, 75 mg, Oral, DAILY, Tania Mccormick, NP, 75 mg at 01/09/23 0913    [Held by provider] potassium chloride SR (KLOR-CON 10) tablet 40 mEq, 40 mEq, Oral, BID, Emma MOLINA MD, 40 mEq at 01/03/23 0838    pantoprazole (PROTONIX) tablet 40 mg, 40 mg, Oral, ACB, Jaky Yoope, DO, 40 mg at 01/10/23 0708    heparin (porcine) injection 5,000 Units, 5,000 Units, SubCUTAneous, Q12H, Jaky Yoope, DO, 5,000 Units at 01/09/23 2152    QUEtiapine (SEROquel) tablet 50 mg, 50 mg, Oral, QHS, David Aguilar G, DO, 50 mg at 01/09/23 2152    lidocaine 4 % patch 1 Patch, 1 Patch, TransDERmal, Q24H, Juanito Mendoza MD, 1 Patch at 01/09/23 1728    balsam peru-castor oiL (VENELEX) ointment, , Topical, BID, Josh Reynoso MD, Given at 01/09/23 1800    alteplase (CATHFLO) 1 mg in sterile water (preservative free) 1 mL injection, 1 mg, InterCATHeter, PRN, Juanito Mendoza MD    bacitracin 500 unit/gram packet 1 Packet, 1 Packet, Topical, PRN, Emma MOLINA MD    glucose chewable tablet 16 g, 4 Tablet, Oral, PRN, Emma MOLINA MD    glucagon (GLUCAGEN) injection 1 mg, 1 mg, IntraMUSCular, PRN, Juanito Mendoza MD    senna-docusate (PERICOLACE) 8.6-50 mg per tablet 1 Tablet, 1 Tablet, Oral, BID PRN, Juanito Mendoza MD, 1 Tablet at 12/26/22 3249    bisacodyL (DULCOLAX) suppository 10 mg, 10 mg, Rectal, QHS PRN, Walker Quinonez MD    sodium chloride (NS) flush 5-40 mL, 5-40 mL, IntraVENous, Q8H, Walker Aponte MD, 10 mL at 01/10/23 0708    sodium chloride (NS) flush 5-40 mL, 5-40 mL, IntraVENous, PRN, Juanito Mendoza MD, 10 mL at 12/28/22 0845    acetaminophen (TYLENOL) tablet 650 mg, 650 mg, Oral, Q6H PRN, 650 mg at 01/05/23 2234 **OR** acetaminophen (TYLENOL) suppository 650 mg, 650 mg, Rectal, Q6H PRN, Piyush MOLINA MD    polyethylene glycol (MIRALAX) packet 17 g, 17 g, Oral, DAILY PRN, Severa Grime, MD, 17 g at 12/26/22 0649    ondansetron (ZOFRAN ODT) tablet 4 mg, 4 mg, Oral, Q8H PRN **OR** ondansetron (ZOFRAN) injection 4 mg, 4 mg, IntraVENous, Q6H PRN, Piyush MOLINA MD, 4 mg at 12/24/22 0849    aspirin delayed-release tablet 81 mg, 81 mg, Oral, DAILY, Ranjit Pearson MD, 81 mg at 01/09/23 0911    ipratropium (ATROVENT) 21 mcg (0.03 %) nasal spray 2 Spray, 2 Spray, Both Nostrils, Q12H, Ranjit Pearson MD, 2 Talpa at 01/09/23 2153    tamsulosin (FLOMAX) capsule 0.4 mg, 0.4 mg, Oral, DAILY, Geoffrey Pearson MD, 0.4 mg at 01/09/23 0911    sodium chloride (NS) flush 5-40 mL, 5-40 mL, IntraVENous, PRN, Severa Grime, MD    PATIENT CARE TEAM:  Patient Care Team:  Berny Cedeno MD as PCP - General (Family Medicine)  Berny Cedeno MD as PCP - [address] [place] [address] Provider  Jan Mckeon MD (Cardiovascular Disease Physician)  Porsha Lebron MD (Gastroenterology)  Jewel Woody MD (Cardiothoracic Surgery)  Nathen Thomas MD (Cardiovascular Disease Physician)  Josi Berry MD (Nephrology)  Kike Rodriguez, LORI as Care Transitions Nurse  Jesse Kohler MD (Pulmonary Disease)     Thank you for allowing me to participate in this patient's care.     Kathy Lewis NP   [address] [place] [address], [address]  Phone: [phone]

## 2023-01-10 NOTE — PROGRESS NOTES
Problem: Mobility Impaired (Adult and Pediatric)  Goal: *Therapy Goal (Edit Goal, Insert Text)  Description: FUNCTIONAL STATUS PRIOR TO ADMISSION: Patient was independent and active without use of DME. Patient is currently driving. Patient is independent with ADLs and IADLs. HOME SUPPORT PRIOR TO ADMISSION: The patient lived with his wife, but did not require assist.    Physical Therapy Goals  Continued 1/4/2023  1. Patient will move from supine to sit and sit to supine, scoot up and down, and roll side to side in bed with modified independence within 7 day(s). 2.  Patient will transfer from bed to chair and chair to bed with modified independence using the least restrictive device within 7 day(s). 3.  Patient will perform sit to stand with modified independence within 7 day(s). 4.  Patient will ambulate with modified independence for 150 feet with the least restrictive device within 7 day(s). 5.  Patient will ascend/descend 13 stairs with single handrail(s) with modified independence within 7 day(s). Initiated 12/24/2022  1. Patient will move from supine to sit and sit to supine, scoot up and down, and roll side to side in bed with modified independence within 7 day(s). 2.  Patient will transfer from bed to chair and chair to bed with modified independence using the least restrictive device within 7 day(s). 3.  Patient will perform sit to stand with modified independence within 7 day(s). 4.  Patient will ambulate with modified independence for 150 feet with the least restrictive device within 7 day(s). 5.  Patient will ascend/descend 13 stairs with single handrail(s) with modified independence within 7 day(s).     Outcome: Progressing Towards Goal      PHYSICAL THERAPY TREATMENT  Patient: Harsha Cedeno (76 y.o. male)  Date: 1/10/2023  Diagnosis: Sepsis ([facility] [A41.9] <principal problem not specified>  Procedure(s) (LRB):  RIGHT HEART CATH (N/A) 1 Day Post-Op  Precautions: Fall  Chart, physical therapy assessment, plan of care and goals were reviewed. ASSESSMENT  Patient continues with skilled PT services and is progressing towards goals. Pt was able to increase gait tolerance with rollator. Pt was educated on usage. Pt could benefit from continue trial if continues to needs an A.D. pt had no complaints of dizziness or lightheaded with mobility. Pt will need to start stair training if discharging home. 01/10/23 1141 01/10/23 1143 01/10/23 1153   Vital Signs   Pulse (Heart Rate) 87 91 (!) 108   BP (!) 118/55 (!) 100/39 (!) 93/42   MAP (Calculated) 76 (!) 59 (!) 59   BP Patient Position Supine Sitting Standing      01/10/23 1157   Vital Signs   Pulse (Heart Rate) 99   BP (!) 115/51   MAP (Calculated) 72   BP Patient Position Sitting  (post gait)     Current Level of Function Impacting Discharge (mobility/balance): CGA    Other factors to consider for discharge: decrease activity tolerance, 13 steps,          PLAN :  Patient continues to benefit from skilled intervention to address the above impairments. Continue treatment per established plan of care. to address goals. Recommendation for discharge: (in order for the patient to meet his/her long term goals)  Therapy 3 hours per day 5-7 days per week pt is too high functioning at this point to continue to recommend   HHPT with assistance as needed vs SNF    This discharge recommendation:  Has not yet been discussed the attending provider and/or case management    IF patient discharges home will need the following DME: continue rollator trial and order if needed        SUBJECTIVE:   Patient stated lets do one more.     OBJECTIVE DATA SUMMARY:   Critical Behavior:  Neurologic State: Alert, Eyes open spontaneously  Orientation Level: Oriented X4  Cognition: Appropriate decision making, Appropriate for age attention/concentration, Appropriate safety awareness, Follows commands  Safety/Judgement: Awareness of environment, Fall prevention, Insight into deficits  Functional Mobility Training:  Bed Mobility:     Supine to Sit: Supervision              Transfers:  Sit to Stand: Stand-by assistance  Stand to Sit: Stand-by assistance                             Balance:  Sitting: Intact  Standing: Impaired  Standing - Static: Good  Standing - Dynamic : Fair  Ambulation/Gait Training:  Distance (ft): 300 Feet (ft)  Assistive Device: Gait belt;Walker, rollator  Ambulation - Level of Assistance: Stand-by assistance;Contact guard assistance        Gait Abnormalities: Decreased step clearance        Base of Support: Widened        Step Length: Right shortened;Left shortened                   Stairs: Therapeutic Exercises:     Pain Rating:  none    Activity Tolerance:   requires rest breaks    After treatment patient left in no apparent distress:   Sitting in chair, Call bell within reach, Bed / chair alarm activated, and Caregiver / family present    COMMUNICATION/COLLABORATION:   The patients plan of care was discussed with: Registered nurse.      Bibiana Raymond PTA   Time Calculation: 24 mins

## 2023-01-10 NOTE — PROGRESS NOTES
[address] [facility] Adult  Hospitalist Group                                                                                          Hospitalist Progress Note  Domenica Denis MD  Answering service: [phone] from in house phone        Date of Service:  1/10/2023  NAME:  Emiliano Valadez  :  1950  MRN:  [id number]      Admission Summary:   Emiliano Valadez is a 71 y.o. male who presents with progressively worsening shortness of breath for past several days. It has gotten worse to the point that he can only ambulate a few steps due to severe dyspnea and near syncope. Patient also noted abdominal distention which is increasing. Patient with known history of cardiomyopathy and recently admitted for CHF exacerbation a week ago. On presentation to the ER, chest x-ray shows diffuse airspace disease atypical infection versus edema. Also patient was noted to have elevated troponin and BNP. Patient was further evaluated by CT abdomen and pelvis which shows massively distended bladder with some bilateral hydronephrosis, subsequently Vasquez was placed. Patient also with significant leukocytosis as well as tachycardic and elevated lactic acid level and subsequently code sepsis was called. Hospitalist service requested admit the patient for further evaluation management.      The patient denies any headache, blurry vision, sore throat, trouble swallowing, trouble with speech, chest pain, SOB, cough, fever, chills, N/V/D, abd pain, urinary symptoms, constipation, recent travels, sick contacts, focal or generalized neurological symptoms, falls, injuries, rashes, contact with COVID-19 diagnosed patients, hematemesis, melena, hemoptysis, hematuria, rashes, denies starting any new medications and denies any other concerns or problems besides as mentioned above       Interval history / Subjective:     Patient seen and examined status post right heart cath  Discussed with cardiology about the right heart cath finding and patient currently on optimal dry weight  Discussed with advanced heart failure team family wanted to proceed with LVAD evaluation so soon will be put on milrinone drip and EGD will be done    I discussed care in length with cardiology advanced heart failure team and family at bedside     Assessment & Plan:     Acute on chronic systolic congestive heart failure   Cardiogenic shock due to above - precluding comprehensive GDMT Rx for HF.   CAD   S/p CABG   Ischemic Cardiomyopathy      Appreciate help from Heart failure team.   S/p RHC -RHC today with normal to low filling pressures but mildly reduced CI. CI of 2 l/min/m2 off dobutamine. Will await echocardiogram today to evaluate EF again. No evidence of RV dysfunction by RV Dp/Dt or RVSWI. Dobutamine drip is being off  Continue empagliflozin for assistance with HF   Continue Dig for HF assistance. Continue digoxin aspirin Plavix Aldactone  Cannot tolerate beta-blocker or ACE inhibitor due to hypotension     TANNER on CKD stage III   Improving   Monitor daily weight , I/O   Bumetanide being held  On Aldactone     GERD -chronic   No change in current Rx. Continue PPI     Depression  Hospital delirium  -  Continue Seroquel as is QHS - at 50mg   Continue Remeron as was PTA      T2DM -on Insulin   Controlled Romain diet   Insulin NPH Six units BID      BPH -chronic   Continue Tamsulosin      PAD   S/p Right SFA PTCA -followed by Dr. Khai Mayes      Body mass index is 17.39 kg/m².  - severe protein romain malnutrition         Code status: DNR  Prophylaxis: heparin   Care Plan discussed with: pt, RN   Anticipated Disposition: greater than 48 hours,   Being worked up and considered for possible [address] Problems  Date Reviewed: 12/5/2022            Codes Class Noted POA    Systolic CHF, acute on chronic ([facility] [address] ICD-10-CM: I50.23  ICD-9-CM: 428.23, 428.0  12/29/2022 Yes        Receiving inotropic medication ICD-10-CM: K71.441  ICD-9-CM: V49.89  12/29/2022 Unknown        Sepsis ([facility] [address] ICD-10-CM: A41.9  ICD-9-CM: 038.9, 995.91  12/22/2022 Unknown       Review of Systems:   A comprehensive review of systems was negative except for that written in the HPI. Vital Signs:    Last 24hrs VS reviewed since prior progress note. Most recent are:  Visit Vitals  BP (!) 115/51 (BP Patient Position: Sitting)   Pulse 93   Temp 97.6 °F (36.4 °C)   Resp 17   Ht 5' 9\" (1.753 m)   Wt 51.8 kg (114 lb 3.2 oz)   SpO2 96%   BMI 16.86 kg/m²         Intake/Output Summary (Last 24 hours) at 1/10/2023 1251  Last data filed at 1/10/2023 0700  Gross per 24 hour   Intake 503.07 ml   Output 2750 ml   Net -2246.93 ml          Physical Examination:     I had a face to face encounter with this patient and independently examined them on 1/10/2023 as outlined below:          Constitutional:  No acute distress, , pleasant, frail elderly    ENT:  Oral mucosa dry  oropharynx benign. Resp:  CTA bilaterally. No wheezing/rhonchi/rales. No accessory muscle use. CV:  Regular rhythm, normal rate, no murmurs, gallops, rubs    GI:  Soft, non distended, non tender. Bs+    Musculoskeletal:  No edema, warm, 2+ pulses throughout    Neurologic:  Moves all extremities.   Awake and alert, CN II-XII reviewed            Data Review:    Review and/or order of clinical lab test  Review and/or order of tests in the radiology section of CPT  Review and/or order of tests in the medicine section of CPT      Labs:     Recent Labs     01/09/23 0033   WBC 9.0   HGB 8.7*   HCT 29.2*          Recent Labs     01/10/23  0306 01/09/23  0033 01/08/23  0616    138 138   K 4.3 4.6 4.8    108 108   CO2 24 23 24   BUN 30* 36* 36*   CREA 1.14 1.27 1.26   GLU 77 192* 135*   CA 9.3 9.4 9.1   MG 2.1 2.2 2.2   PHOS 3.5 2.4* 2.7       Recent Labs     01/10/23  0306 01/09/23  0033 01/08/23  0616   ALT 79* 92* 111*   * 219* 220*   TBILI 0.4 0.3 0.4   TP 6.6 6.1* 5.9*   ALB 2.9* 2.9* 3.0*   GLOB 3.7 3.2 2.9       No results for input(s): INR, PTP, APTT, INREXT, INREXT in the last 72 hours. No results for input(s): FE, TIBC, PSAT, FERR in the last 72 hours. Lab Results   Component Value Date/Time    Folate 10.5 07/03/2018 09:24 AM        No results for input(s): PH, PCO2, PO2 in the last 72 hours. No results for input(s): CPK, CKNDX, TROIQ in the last 72 hours.     No lab exists for component: CPKMB    Lab Results   Component Value Date/Time    Cholesterol, total 143 10/12/2022 09:10 AM    HDL Cholesterol 49 10/12/2022 09:10 AM    LDL, calculated 41.4 10/12/2022 09:10 AM    Triglyceride 263 (H) 10/12/2022 09:10 AM    CHOL/HDL Ratio 2.9 10/12/2022 09:10 AM     Lab Results   Component Value Date/Time    Glucose (POC) 164 (H) 01/10/2023 11:05 AM    Glucose (POC) 105 01/10/2023 07:00 AM    Glucose (POC) 243 (H) 01/09/2023 09:07 PM    Glucose (POC) 169 (H) 01/09/2023 04:24 PM    Glucose (POC) 129 (H) 01/09/2023 06:38 AM     Lab Results   Component Value Date/Time    Color YELLOW/STRAW 01/01/2023 09:13 AM    Appearance CLEAR 01/01/2023 09:13 AM    Specific gravity 1.013 01/01/2023 09:13 AM    Specific gravity 1.020 05/03/2014 08:18 AM    pH (UA) 5.5 01/01/2023 09:13 AM    Protein 30 (A) 01/01/2023 09:13 AM    Glucose Negative 01/01/2023 09:13 AM    Ketone Negative 01/01/2023 09:13 AM    Bilirubin Negative 01/01/2023 09:13 AM    Urobilinogen 1.0 01/01/2023 09:13 AM    Nitrites Negative 01/01/2023 09:13 AM    Leukocyte Esterase Negative 01/01/2023 09:13 AM    Epithelial cells FEW 01/01/2023 09:13 AM    Bacteria Negative 01/01/2023 09:13 AM    WBC 0-4 01/01/2023 09:13 AM    RBC 0-5 01/01/2023 09:13 AM         Medications Reviewed:     Current Facility-Administered Medications   Medication Dose Route Frequency    insulin glargine (LANTUS) injection 6 Units  6 Units SubCUTAneous QHS    insulin lispro (HUMALOG) injection 4 Units  4 Units SubCUTAneous TID WITH MEALS    insulin lispro (HUMALOG) injection   SubCUTAneous AC&HS    dextrose 10 % infusion 0-250 mL  0-250 mL IntraVENous PRN    [Held by provider] bumetanide (BUMEX) tablet 1 mg  1 mg Oral DAILY    spironolactone (ALDACTONE) tablet 12.5 mg  12.5 mg Oral DAILY    melatonin tablet 3 mg  3 mg Oral QHS PRN    [Held by provider] DOBUTamine (DOBUTREX) 500 mg/250 mL (2,000 mcg/mL) infusion  1 mcg/kg/min IntraVENous CONTINUOUS    empagliflozin (JARDIANCE) tablet 10 mg  10 mg Oral DAILY    dextrose 10 % infusion 0-250 mL  0-250 mL IntraVENous PRN    albuterol-ipratropium (DUO-NEB) 2.5 MG-0.5 MG/3 ML  3 mL Nebulization Q6H PRN    digoxin (LANOXIN) tablet 0.125 mg  0.125 mg Oral DAILY    mirtazapine (REMERON) tablet 7.5 mg  7.5 mg Oral QHS    clopidogreL (PLAVIX) tablet 75 mg  75 mg Oral DAILY    [Held by provider] potassium chloride SR (KLOR-CON 10) tablet 40 mEq  40 mEq Oral BID    pantoprazole (PROTONIX) tablet 40 mg  40 mg Oral ACB    heparin (porcine) injection 5,000 Units  5,000 Units SubCUTAneous Q12H    QUEtiapine (SEROquel) tablet 50 mg  50 mg Oral QHS    lidocaine 4 % patch 1 Patch  1 Patch TransDERmal Q24H    balsam peru-castor oiL (VENELEX) ointment   Topical BID    alteplase (CATHFLO) 1 mg in sterile water (preservative free) 1 mL injection  1 mg InterCATHeter PRN    bacitracin 500 unit/gram packet 1 Packet  1 Packet Topical PRN    glucose chewable tablet 16 g  4 Tablet Oral PRN    glucagon (GLUCAGEN) injection 1 mg  1 mg IntraMUSCular PRN    senna-docusate (PERICOLACE) 8.6-50 mg per tablet 1 Tablet  1 Tablet Oral BID PRN    bisacodyL (DULCOLAX) suppository 10 mg  10 mg Rectal QHS PRN    sodium chloride (NS) flush 5-40 mL  5-40 mL IntraVENous Q8H    sodium chloride (NS) flush 5-40 mL  5-40 mL IntraVENous PRN    acetaminophen (TYLENOL) tablet 650 mg  650 mg Oral Q6H PRN    Or    acetaminophen (TYLENOL) suppository 650 mg  650 mg Rectal Q6H PRN    polyethylene glycol (MIRALAX) packet 17 g  17 g Oral DAILY PRN    ondansetron (ZOFRAN ODT) tablet 4 mg  4 mg Oral Q8H PRN    Or    ondansetron [facility]) injection 4 mg  4 mg IntraVENous Q6H PRN    aspirin delayed-release tablet 81 mg  81 mg Oral DAILY    ipratropium (ATROVENT) 21 mcg (0.03 %) nasal spray 2 Spray  2 Spray Both Nostrils Q12H    tamsulosin (FLOMAX) capsule 0.4 mg  0.4 mg Oral DAILY    sodium chloride (NS) flush 5-40 mL  5-40 mL IntraVENous PRN     ______________________________________________________________________  EXPECTED LENGTH OF STAY: 5d 0h  ACTUAL LENGTH OF STAY:          Mendy Coulter MD

## 2023-01-10 NOTE — PROGRESS NOTES
0730: Bedside and Verbal shift change report given to LORI Corbett (oncoming nurse) by Kathy Vasquez RN (offgoing nurse). Report included the following information SBAR, Kardex, MAR, and Cardiac Rhythm NSR / ST .     0840: Pt in chair when entering room, BP obtained for AM medications. SBP 70s asymptomatic, pt assisted back to bed, SBP recovered to 120s. NP Shaila notified, per NP okay to administer aldactone. 1930: Bedside and Verbal shift change report given to César Arthur RN (oncoming nurse) by Joan Melvin RN (offgoing nurse). Report included the following information SBAR, Kardex, MAR, and Cardiac Rhythm NSR / ST . Care Plan:   Problem: Diabetes Self-Management  Goal: *Disease process and treatment process  Description: Define diabetes and identify own type of diabetes; list 3 options for treating diabetes. Outcome: Progressing Towards Goal  Goal: *Incorporating nutritional management into lifestyle  Description: Describe effect of type, amount and timing of food on blood glucose; list 3 methods for planning meals. Outcome: Progressing Towards Goal  Goal: *Incorporating physical activity into lifestyle  Description: State effect of exercise on blood glucose levels. Outcome: Progressing Towards Goal  Goal: *Developing strategies to promote health/change behavior  Description: Define the ABC's of diabetes; identify appropriate screenings, schedule and personal plan for screenings. Outcome: Progressing Towards Goal  Goal: *Using medications safely  Description: State effect of diabetes medications on diabetes; name diabetes medication taking, action and side effects. Outcome: Progressing Towards Goal  Goal: *Monitoring blood glucose, interpreting and using results  Description: Identify recommended blood glucose targets  and personal targets.   Outcome: Progressing Towards Goal  Goal: *Prevention, detection, treatment of acute complications  Description: List symptoms of hyper- and hypoglycemia; describe how to treat low blood sugar and actions for lowering  high blood glucose level. Outcome: Progressing Towards Goal  Goal: *Prevention, detection and treatment of chronic complications  Description: Define the natural course of diabetes and describe the relationship of blood glucose levels to long term complications of diabetes. Outcome: Progressing Towards Goal  Goal: *Developing strategies to address psychosocial issues  Description: Describe feelings about living with diabetes; identify support needed and support network  Outcome: Progressing Towards Goal  Goal: *Insulin pump training  Outcome: Progressing Towards Goal  Goal: *Sick day guidelines  Outcome: Progressing Towards Goal  Goal: *Patient Specific Goal (EDIT GOAL, INSERT TEXT)  Outcome: Progressing Towards Goal       Problem: Patient Education: Go to Patient Education Activity  Goal: Patient/Family Education  Outcome: Progressing Towards Goal       Problem: Falls - Risk of  Goal: *Absence of Falls  Description: Document Laverne Fall Risk and appropriate interventions in the flowsheet.   Outcome: Progressing Towards Goal  Note: Fall Risk Interventions:  Mobility Interventions: Assess mobility with egress test, Communicate number of staff needed for ambulation/transfer, Patient to call before getting OOB    Medication Interventions: Assess postural VS orthostatic hypotension, Evaluate medications/consider consulting pharmacy, Patient to call before getting OOB    Elimination Interventions: Call light in reach, Patient to call for help with toileting needs    History of Falls Interventions: Door open when patient unattended, Evaluate medications/consider consulting pharmacy      Problem: Patient Education: Go to Patient Education Activity  Goal: Patient/Family Education  Outcome: Progressing Towards Goal       Problem: Patient Education: Go to Patient Education Activity  Goal: Patient/Family Education  Outcome: Progressing Towards Goal

## 2023-01-11 ENCOUNTER — APPOINTMENT (OUTPATIENT)
Dept: VASCULAR SURGERY | Age: 73
DRG: 871 | End: 2023-01-11
Attending: NURSE PRACTITIONER
Payer: MEDICARE

## 2023-01-11 LAB
ALBUMIN SERPL-MCNC: 3 G/DL (ref 3.5–5)
ALBUMIN/GLOB SERPL: 1 (ref 1.1–2.2)
ALP SERPL-CCNC: 212 U/L (ref 45–117)
ALT SERPL-CCNC: 69 U/L (ref 12–78)
ANION GAP SERPL CALC-SCNC: 8 MMOL/L (ref 5–15)
AST SERPL-CCNC: 40 U/L (ref 15–37)
BILIRUB SERPL-MCNC: 0.4 MG/DL (ref 0.2–1)
BNP SERPL-MCNC: 4355 PG/ML
BUN SERPL-MCNC: 27 MG/DL (ref 6–20)
BUN/CREAT SERPL: 23 (ref 12–20)
CALCIUM SERPL-MCNC: 9.2 MG/DL (ref 8.5–10.1)
CHLORIDE SERPL-SCNC: 106 MMOL/L (ref 97–108)
CO2 SERPL-SCNC: 22 MMOL/L (ref 21–32)
CORTIS SERPL-MCNC: 9.4 UG/DL
CREAT SERPL-MCNC: 1.2 MG/DL (ref 0.7–1.3)
CRP SERPL-MCNC: 2.15 MG/DL (ref 0–0.6)
DIGOXIN SERPL-MCNC: 1.1 NG/ML (ref 0.9–2)
ERYTHROCYTE [DISTWIDTH] IN BLOOD BY AUTOMATED COUNT: 22.5 % (ref 11.5–14.5)
EST. AVERAGE GLUCOSE BLD GHB EST-MCNC: 174 MG/DL
GLOBULIN SER CALC-MCNC: 3 G/DL (ref 2–4)
GLUCOSE BLD STRIP.AUTO-MCNC: 103 MG/DL (ref 65–117)
GLUCOSE BLD STRIP.AUTO-MCNC: 111 MG/DL (ref 65–117)
GLUCOSE BLD STRIP.AUTO-MCNC: 154 MG/DL (ref 65–117)
GLUCOSE BLD STRIP.AUTO-MCNC: 198 MG/DL (ref 65–117)
GLUCOSE BLD STRIP.AUTO-MCNC: 241 MG/DL (ref 65–117)
GLUCOSE SERPL-MCNC: 146 MG/DL (ref 65–100)
HBA1C MFR BLD: 7.7 % (ref 4–5.6)
HCT VFR BLD AUTO: 29.4 % (ref 36.6–50.3)
HCV AB SERPL QL IA: NONREACTIVE
HGB BLD-MCNC: 8.5 G/DL (ref 12.1–17)
HIV 1+2 AB+HIV1 P24 AG SERPL QL IA: NONREACTIVE
HIV12 RESULT COMMENT, HHIVC: NORMAL
INR PPP: 1 (ref 0.9–1.1)
MAGNESIUM SERPL-MCNC: 2.2 MG/DL (ref 1.6–2.4)
MCH RBC QN AUTO: 23.3 PG (ref 26–34)
MCHC RBC AUTO-ENTMCNC: 28.9 G/DL (ref 30–36.5)
MCV RBC AUTO: 80.5 FL (ref 80–99)
NRBC # BLD: 0 K/UL (ref 0–0.01)
NRBC BLD-RTO: 0 PER 100 WBC
PHOSPHATE SERPL-MCNC: 3.2 MG/DL (ref 2.6–4.7)
PLATELET # BLD AUTO: 276 K/UL (ref 150–400)
PMV BLD AUTO: 11.1 FL (ref 8.9–12.9)
POTASSIUM SERPL-SCNC: 4.6 MMOL/L (ref 3.5–5.1)
PROCALCITONIN SERPL-MCNC: 0.19 NG/ML
PROT SERPL-MCNC: 6 G/DL (ref 6.4–8.2)
PROTHROMBIN TIME: 10.8 SEC (ref 9–11.1)
RBC # BLD AUTO: 3.65 M/UL (ref 4.1–5.7)
SERVICE CMNT-IMP: ABNORMAL
SERVICE CMNT-IMP: NORMAL
SERVICE CMNT-IMP: NORMAL
SODIUM SERPL-SCNC: 136 MMOL/L (ref 136–145)
WBC # BLD AUTO: 6.6 K/UL (ref 4.1–11.1)

## 2023-01-11 PROCEDURE — 74011250636 HC RX REV CODE- 250/636: Performed by: STUDENT IN AN ORGANIZED HEALTH CARE EDUCATION/TRAINING PROGRAM

## 2023-01-11 PROCEDURE — 82272 OCCULT BLD FECES 1-3 TESTS: CPT

## 2023-01-11 PROCEDURE — 86900 BLOOD TYPING SEROLOGIC ABO: CPT

## 2023-01-11 PROCEDURE — 87389 HIV-1 AG W/HIV-1&-2 AB AG IA: CPT

## 2023-01-11 PROCEDURE — 97535 SELF CARE MNGMENT TRAINING: CPT

## 2023-01-11 PROCEDURE — 99232 SBSQ HOSP IP/OBS MODERATE 35: CPT | Performed by: NURSE PRACTITIONER

## 2023-01-11 PROCEDURE — 85027 COMPLETE CBC AUTOMATED: CPT

## 2023-01-11 PROCEDURE — 74011000250 HC RX REV CODE- 250: Performed by: STUDENT IN AN ORGANIZED HEALTH CARE EDUCATION/TRAINING PROGRAM

## 2023-01-11 PROCEDURE — 83880 ASSAY OF NATRIURETIC PEPTIDE: CPT

## 2023-01-11 PROCEDURE — 65660000001 HC RM ICU INTERMED STEPDOWN

## 2023-01-11 PROCEDURE — 93880 EXTRACRANIAL BILAT STUDY: CPT

## 2023-01-11 PROCEDURE — 36415 COLL VENOUS BLD VENIPUNCTURE: CPT

## 2023-01-11 PROCEDURE — 84100 ASSAY OF PHOSPHORUS: CPT

## 2023-01-11 PROCEDURE — 74011250637 HC RX REV CODE- 250/637: Performed by: NURSE PRACTITIONER

## 2023-01-11 PROCEDURE — 74011250637 HC RX REV CODE- 250/637: Performed by: INTERNAL MEDICINE

## 2023-01-11 PROCEDURE — 86803 HEPATITIS C AB TEST: CPT

## 2023-01-11 PROCEDURE — 86920 COMPATIBILITY TEST SPIN: CPT

## 2023-01-11 PROCEDURE — 80162 ASSAY OF DIGOXIN TOTAL: CPT

## 2023-01-11 PROCEDURE — 86922 COMPATIBILITY TEST ANTIGLOB: CPT

## 2023-01-11 PROCEDURE — 74011250637 HC RX REV CODE- 250/637: Performed by: FAMILY MEDICINE

## 2023-01-11 PROCEDURE — 74011250636 HC RX REV CODE- 250/636: Performed by: NURSE PRACTITIONER

## 2023-01-11 PROCEDURE — 83735 ASSAY OF MAGNESIUM: CPT

## 2023-01-11 PROCEDURE — 83036 HEMOGLOBIN GLYCOSYLATED A1C: CPT

## 2023-01-11 PROCEDURE — 86140 C-REACTIVE PROTEIN: CPT

## 2023-01-11 PROCEDURE — 82533 TOTAL CORTISOL: CPT

## 2023-01-11 PROCEDURE — 86921 COMPATIBILITY TEST INCUBATE: CPT

## 2023-01-11 PROCEDURE — 74011636637 HC RX REV CODE- 636/637: Performed by: CLINICAL NURSE SPECIALIST

## 2023-01-11 PROCEDURE — 93922 UPR/L XTREMITY ART 2 LEVELS: CPT

## 2023-01-11 PROCEDURE — 82962 GLUCOSE BLOOD TEST: CPT

## 2023-01-11 PROCEDURE — 74011250637 HC RX REV CODE- 250/637: Performed by: STUDENT IN AN ORGANIZED HEALTH CARE EDUCATION/TRAINING PROGRAM

## 2023-01-11 PROCEDURE — 97116 GAIT TRAINING THERAPY: CPT

## 2023-01-11 PROCEDURE — 85610 PROTHROMBIN TIME: CPT

## 2023-01-11 PROCEDURE — 84145 PROCALCITONIN (PCT): CPT

## 2023-01-11 PROCEDURE — 86870 RBC ANTIBODY IDENTIFICATION: CPT

## 2023-01-11 PROCEDURE — 80053 COMPREHEN METABOLIC PANEL: CPT

## 2023-01-11 RX ORDER — MILRINONE LACTATE 0.2 MG/ML
0.12 INJECTION, SOLUTION INTRAVENOUS CONTINUOUS
Status: DISCONTINUED | OUTPATIENT
Start: 2023-01-11 | End: 2023-01-12

## 2023-01-11 RX ADMIN — Medication 1 UNITS: at 21:47

## 2023-01-11 RX ADMIN — MILRINONE LACTATE IN DEXTROSE 0.12 MCG/KG/MIN: 200 INJECTION, SOLUTION INTRAVENOUS at 16:00

## 2023-01-11 RX ADMIN — IPRATROPIUM BROMIDE 2 SPRAY: 21 SPRAY, METERED NASAL at 21:48

## 2023-01-11 RX ADMIN — HEPARIN SODIUM 5000 UNITS: 5000 INJECTION INTRAVENOUS; SUBCUTANEOUS at 21:46

## 2023-01-11 RX ADMIN — SODIUM CHLORIDE, PRESERVATIVE FREE 10 ML: 5 INJECTION INTRAVENOUS at 06:29

## 2023-01-11 RX ADMIN — TAMSULOSIN HYDROCHLORIDE 0.4 MG: 0.4 CAPSULE ORAL at 08:19

## 2023-01-11 RX ADMIN — Medication: at 17:36

## 2023-01-11 RX ADMIN — MIRTAZAPINE 7.5 MG: 15 TABLET, FILM COATED ORAL at 21:47

## 2023-01-11 RX ADMIN — CLOPIDOGREL BISULFATE 75 MG: 75 TABLET ORAL at 08:19

## 2023-01-11 RX ADMIN — ASPIRIN 81 MG: 81 TABLET, COATED ORAL at 08:19

## 2023-01-11 RX ADMIN — Medication 4 UNITS: at 17:33

## 2023-01-11 RX ADMIN — SPIRONOLACTONE 12.5 MG: 25 TABLET ORAL at 08:19

## 2023-01-11 RX ADMIN — HEPARIN SODIUM 5000 UNITS: 5000 INJECTION INTRAVENOUS; SUBCUTANEOUS at 08:19

## 2023-01-11 RX ADMIN — IPRATROPIUM BROMIDE 2 SPRAY: 21 SPRAY, METERED NASAL at 08:18

## 2023-01-11 RX ADMIN — EMPAGLIFLOZIN 10 MG: 10 TABLET, FILM COATED ORAL at 08:19

## 2023-01-11 RX ADMIN — SODIUM CHLORIDE, PRESERVATIVE FREE 10 ML: 5 INJECTION INTRAVENOUS at 21:47

## 2023-01-11 RX ADMIN — Medication: at 08:18

## 2023-01-11 RX ADMIN — Medication 4 UNITS: at 12:11

## 2023-01-11 RX ADMIN — QUETIAPINE FUMARATE 50 MG: 25 TABLET ORAL at 21:47

## 2023-01-11 RX ADMIN — INSULIN GLARGINE 6 UNITS: 100 INJECTION, SOLUTION SUBCUTANEOUS at 21:46

## 2023-01-11 RX ADMIN — Medication 4 UNITS: at 08:19

## 2023-01-11 RX ADMIN — PANTOPRAZOLE SODIUM 40 MG: 40 TABLET, DELAYED RELEASE ORAL at 06:31

## 2023-01-11 RX ADMIN — DIGOXIN 0.12 MG: 125 TABLET ORAL at 08:19

## 2023-01-11 RX ADMIN — SODIUM CHLORIDE, PRESERVATIVE FREE 5 ML: 5 INJECTION INTRAVENOUS at 13:27

## 2023-01-11 NOTE — PROGRESS NOTES
Comprehensive Nutrition Assessment    Type and Reason for Visit: Reassess    Nutrition Recommendations/Plan:   Continue with 4 Carb Choice diet  Continue Glucerna (vanilla) daily, will d/c Ensure per pt request       Malnutrition Assessment:  Malnutrition Status:  Severe malnutrition (12/29/22 1147)    Context:  Acute illness     Findings of the 6 clinical characteristics of malnutrition:   Energy Intake:  Unable to assess  Weight Loss:  Greater than 5% over 1 month     Body Fat Loss: Moderate body fat loss, Buccal region, Orbital   Muscle Mass Loss: Moderate muscle mass loss, Clavicles (pectoralis & deltoids), Thigh (quadriceps), Calf  Fluid Accumulation:  No significant fluid accumulation,     Strength:  Not performed        Nutrition Assessment:    Pt admitted with Sepsis. PMHx: CAD, DM 2, HTN, NSTEMI. Cardiogenic shock resolved-off pressor. Cardiomyopathy systolic heart failure; ?myocarditis. Steroids ordered; biopsy deferred for now. EF ~20%. TANNER on CKD 3. Severe fatigue. 1/11: Follow up. Family is considering LVAD workup. Spoke with pt at bedside. Wife not present today who usually gives better detailed reports of exactly what and how much pt has eaten. When I ask pt how he is eating he always says, \"okay\". Breakfast tray in room, pt consumed 100%! He ate sausage, toast, fruit, milk, Glucerna. Intakes recently have been reported as % meals which is a huge improvement. Multiple ONS piled up on pt's bedside table, he states he is sometimes drinking them now, usually 1x/day. Will decrease the orders. He is now preferring vanilla flavor. Will continue [employer] as he is eating some of that as well. Weight is remaining stable which is good but would like to start seeing some weight gain. Labs:      1/5:  Follow up. Spoke with pt and wife at bedside. Pt states he did not eat much of dinner last night that wife brought it because the nurse did not heat it up correctly.   For breakfast this morning he ate 1 piece of toast and turkey sausage, drank OJ and Ensure. Received a perfectserve message from Diabetes Nurse Specialist about possibly changing his ONS to provide few CHO. Spoke with pt about trying Glucerna and he was agreeable (vanilla flavor). Will continue strawberry Ensure Enlive 1x/day as pt enjoys this. Wife states she had trouble ordering meals for him because he wanted a grilled cheese sandwich but was told he cannot have it with his current Na+ restriction. Discussed my plan to liberalize his diet with wife and suggested she call again today to order that for him for dinner. He already placed lunch order: chicken noodle soup, fish, mashed potatoes, green beans. Pt has been eating very little of our food so I'm hoping if he can order things he enjoys it might help with his intakes. Weight has been stable this admission. Pt with severe muscle and fat wasting. I continue to encourage increasing his PO intakes. Labs: LFT's elevated, POC BG last 24h: 122-383        1/4:  Spoke with pt and wife at bedside. RD previously discussed recommendation for DHT to provide nocturnal TF with pt's daughter. This information was passed on to pt's wife so when I brought this up she was aware of this conversation. I explained how this would work, the benefit of it, etc.  She asked appropriate questions and ultimately decided that she wants to hold off on having DHT placed at this time and try to encourage increased PO intakes. Pt had just consumed about 25% of a small hamburger that wife had made and then vomited. No previous episodes of emesis this admission. Pt states he is drinking the Ensure [place] when it comes and also drinking milk. Now complaining that this might be too much milk at one time and his stomach is not feeling the best with so much. Encouraged pt to separate when he drinks the Ensures and milks.   Provided pt with a menu and the diet office # for him and his wife to call and order meals for him. He was asking me for soup. Wife also continues to bring in meals from home and some spices. PO intakes have remained poor. 1/2: MD consult received for CHF diet education. Briefly discussed this with pt. No family present at time of visit but pt states his son will be coming in later today and will look over the information. Handout with RD contact info left in pt's room. Did discuss his intakes as they have been poor. He states he is eating very little still. Is drinking Ensure and milk and eating some of what his family brings in. Has also tried [employer] and likes that okay, states he is eating some of it. Did not offer up any other food preferences. Encouraged increased PO intakes. 12/29: Per wife, pt weighed 135# when admitted on 12/5. Current wt reflects 22# weight loss-suspect mostly fluid however wife reports pt without edema when weighed 135#. Pt appears malnourished; meets criteria for acute malnutrition (see above). Poor PO intake noted by wife since first hospital admission but worse in the past week. Family bringing in food from home; Mr Jodi Sullivan did accept strawberry Ensure well yesterday. Pt complains of early satiety-will take a few bites and then states he doesn't want anymore. Encouraged wife to have patient eat throughout the day vs just at mealtime; consume Ensure supplements between meals. Mr Jodi Sullivan is accepting bananas and milk well per wife; wife also bringing in extra spices for hospital food as well as [facility] food that pt normally consumes. Will modify Glucerna shake to Ensure Plus HP since pt eating little and prefers strawberry flavor. Ensure also provides more kcal/protein per serving (350 kcal/20 gm protein vs 220 kcal/10 gm protein). Per documentation below-pt is at max consuming ~35% of meals. Will continue to monitor intake and weight. May benefit from short term EN support.      Potassium replaced today. Insulin drip ordered 2/2 addition of steroid. BG close to 500 yesterday evening-better today. BUN, CR, Phosphorus and Magnesium all elevated d/t TANNER. Vit D discontinued today 2/2 hypercalcemia-nephrology following. Nutritionally Significant Medications:  Humalog, Jardiance, Lantus, Remeron, Protonix, Aldactone, Dobutamine      Estimated Daily Nutrient Needs:  Energy Requirements Based On: Kcal/kg  Weight Used for Energy Requirements: Current (51.7 kg)  Energy (kcal/day): 3130-2530 (30-35 kcal/kg)  Weight Used for Protein Requirements: Current  Protein (g/day): 77 (1.5 g/kg)  Method Used for Fluid Requirements: 1 ml/kcal  Fluid (ml/day):      Nutrition Related Findings:   Edema: trace BLE  Last BM: 01/10/23, Formed    Wounds: Unstageable      Current Nutrition Therapies:  Diet: 4 Carb Choice  Supplements: Magic Cup daily, Glucerna BID, Ensure Enlive daily  Meal intake: Patient Vitals for the past 168 hrs:   % Diet Eaten   01/10/23 1800 76 - 100%   01/10/23 1200 76 - 100%   01/10/23 0800 76 - 100%   01/10/23 0307 0%   01/09/23 2313 0%   01/09/23 2000 0%   01/09/23 1600 0%   01/09/23 0916 0%   01/09/23 0309 0%   01/08/23 2355 0%   01/08/23 0823 76 - 100%   01/07/23 1505 76 - 100%   01/06/23 0837 76 - 100%   01/05/23 1751 76 - 100%   01/05/23 1156 51 - 75%   01/05/23 0842 51 - 75%   01/04/23 1513 51 - 75%   01/04/23 1228 51 - 75%     Supplement intake: 25-50%    Nutrition Support: none      Anthropometric Measures:  Height: 5' 9\" (175.3 cm)  Ideal Body Weight (IBW): 160 lbs (73 kg)     Current Body Wt:  51.8 kg (114 lb 3.2 oz), 71.4 % IBW.  Bed scale  Current BMI (kg/m2): 16.9                          BMI Category: Underweight (BMI less than 22) age over 72    Wt Readings from Last 10 Encounters:   01/10/23 51.8 kg (114 lb 3.2 oz)   12/19/22 58.5 kg (129 lb)   12/16/22 57.4 kg (126 lb 8.7 oz)   12/05/22 59 kg (130 lb)   12/05/22 59.1 kg (130 lb 3.2 oz)   11/16/22 61.2 kg (135 lb)   10/27/22 60.8 kg (134 lb) 10/27/22 60.8 kg (134 lb)   10/12/22 60.8 kg (134 lb)   09/13/22 60.3 kg (133 lb)           Nutrition Diagnosis:   Increased nutrient needs related to increased demand for energy/nutrients as evidenced by wounds  Underweight related to inadequate protein-energy intake as evidenced by BMI (16.8)    Nutrition Interventions:   Food and/or Nutrient Delivery: Continue current diet, Modify oral nutrition supplement  Nutrition Education/Counseling: No recommendations at this time  Coordination of Nutrition Care: Continue to monitor while inpatient, Interdisciplinary rounds       Goals:  Previous Goal Met: Progressing toward goal(s)  Goals: PO intake 75% or greater, within 7 days  Specify Other Goals: PO intakes > 75% meals + ONS to promote weight gain of 0.5-1 lb over next 5-7 days    Nutrition Monitoring and Evaluation:   Behavioral-Environmental Outcomes: None identified  Food/Nutrient Intake Outcomes: Supplement intake, Food and nutrient intake  Physical Signs/Symptoms Outcomes: Biochemical data, Fluid status or edema, Weight, Hemodynamic status, GI status    Discharge Planning:    Continue current diet, Continue oral nutrition supplement    Erick Bob RD  Available via [employer]

## 2023-01-11 NOTE — PROGRESS NOTES
0730: Bedside and Verbal shift change report given to Chelle RN (oncoming nurse) by Hector Logan RN (offgoing nurse). Report included the following information SBAR, Kardex, MAR, and Cardiac Rhythm NSR / ST .     MARIANELA and Carotid duplex completed. AHF MRSA swab and blood labs obtained. 1930: Bedside and Verbal shift change report given to Wanda Hemphill RN (oncoming nurse) by Percy Samuels RN (offgoing nurse). Report included the following information SBAR, Kardex, MAR, and Cardiac Rhythm NSR / ST .     Pt NPO at midnight for abd ultrasound in AM.         Care Plan:   Problem: Diabetes Self-Management  Goal: *Disease process and treatment process  Description: Define diabetes and identify own type of diabetes; list 3 options for treating diabetes. Outcome: Progressing Towards Goal  Goal: *Developing strategies to promote health/change behavior  Description: Define the ABC's of diabetes; identify appropriate screenings, schedule and personal plan for screenings. Outcome: Progressing Towards Goal  Goal: *Using medications safely  Description: State effect of diabetes medications on diabetes; name diabetes medication taking, action and side effects. Outcome: Progressing Towards Goal       Problem: Patient Education: Go to Patient Education Activity  Goal: Patient/Family Education  Outcome: Progressing Towards Goal       Problem: Falls - Risk of  Goal: *Absence of Falls  Description: Document Jolayne Levels Fall Risk and appropriate interventions in the flowsheet.   Outcome: Progressing Towards Goal  Note: Fall Risk Interventions:  Mobility Interventions: Assess mobility with egress test, Communicate number of staff needed for ambulation/transfer, Patient to call before getting OOB    Medication Interventions: Assess postural VS orthostatic hypotension, Evaluate medications/consider consulting pharmacy, Patient to call before getting OOB    Elimination Interventions: Call light in reach, Patient to call for help with toileting needs    History of Falls Interventions: Door open when patient unattended, Evaluate medications/consider consulting pharmacy      Problem: Patient Education: Go to Patient Education Activity  Goal: Patient/Family Education  Outcome: Progressing Towards Goal       Problem: Patient Education: Go to Patient Education Activity  Goal: Patient/Family Education  Outcome: Progressing Towards Goal

## 2023-01-11 NOTE — PROGRESS NOTES
Problem: Mobility Impaired (Adult and Pediatric)  Goal: *Therapy Goal (Edit Goal, Insert Text)  Description: FUNCTIONAL STATUS PRIOR TO ADMISSION: Patient was independent and active without use of DME. Patient is currently driving. Patient is independent with ADLs and IADLs. HOME SUPPORT PRIOR TO ADMISSION: The patient lived with his wife, but did not require assist.    Physical Therapy Goals  Continued 1/4/2023; remain appropriate 1/11/23  1. Patient will move from supine to sit and sit to supine, scoot up and down, and roll side to side in bed with modified independence within 7 day(s). 2.  Patient will transfer from bed to chair and chair to bed with modified independence using the least restrictive device within 7 day(s). 3.  Patient will perform sit to stand with modified independence within 7 day(s). 4.  Patient will ambulate with modified independence for 150 feet with the least restrictive device within 7 day(s). 5.  Patient will ascend/descend 13 stairs with single handrail(s) with modified independence within 7 day(s). Initiated 12/24/2022  1. Patient will move from supine to sit and sit to supine, scoot up and down, and roll side to side in bed with modified independence within 7 day(s). 2.  Patient will transfer from bed to chair and chair to bed with modified independence using the least restrictive device within 7 day(s). 3.  Patient will perform sit to stand with modified independence within 7 day(s). 4.  Patient will ambulate with modified independence for 150 feet with the least restrictive device within 7 day(s). 5.  Patient will ascend/descend 13 stairs with single handrail(s) with modified independence within 7 day(s).     Outcome: Progressing Towards Goal   PHYSICAL THERAPY TREATMENT: WEEKLY REASSESSMENT  Patient: Anival Trimble (76 y.o. male)  Date: 1/11/2023  Primary Diagnosis: Sepsis ([facility] [A41.9]  Procedure(s) (LRB):  RIGHT HEART CATH (N/A) 2 Days Post-Op Precautions:   Fall      ASSESSMENT  Patient continues with skilled PT services and is progressing towards goals. Patient demonstrates improvements in activity tolerance and gait mechanics. Pt ambulated one lap using rolling walker with fair steadiness. Pt navigated steps using step to pattern and performed multiple reps requiring increased time. Pt ambulated back to room and transferred to chair to rest. Pt then trialed use of rollator demonstrating improved steadiness. Anticipate HHPT upon discharge to continue progressing mobility and achieving functional independence. Patient's progression toward goals since last assessment: supervision ambulation    Current Level of Function Impacting Discharge (mobility/balance): contact guard for stairs    Functional Outcome Measure: The patient scored 22/28 on the Tinetti outcome measure which is indicative of patient is a moderate fall risk. Other factors to consider for discharge: fall risk, LVAD work up         PLAN :  Goals have been updated based on progression since last assessment. Patient continues to benefit from skilled intervention to address the above impairments. Recommendations and Planned Interventions: bed mobility training, transfer training, gait training, therapeutic exercises, neuromuscular re-education, patient and family training/education, and therapeutic activities      Frequency/Duration: Patient will be followed by physical therapy:  5 times a week to address goals. Recommendation for discharge: (in order for the patient to meet his/her long term goals)  Physical therapy at least 2 days/week in the home     This discharge recommendation:  Has been made in collaboration with the attending provider and/or case management    IF patient discharges home will need the following DME: rollator and placed order to CM         SUBJECTIVE:   Patient stated I am good.     OBJECTIVE DATA SUMMARY:   HISTORY:    Past Medical History: Diagnosis Date    CAD (coronary artery disease) 11/10/2016    NSTEMI & 2 stents    Deafness 10/28/2012    DM (diabetes mellitus) ([facility]     Elevated cholesterol     Hypertension     NSTEMI (non-ST elevated myocardial infarction) ([facility] 11/10/2016     Past Surgical History:   Procedure Laterality Date    COLONOSCOPY N/A 6/28/2018    COLONOSCOPY performed by Jamie Jennings MD at [facility] [address]  11/11/2016    2 stents       Personal factors and/or comorbidities impacting plan of care: PMH    Home Situation  Home Environment: Private residence  # Steps to Enter: 5  Rails to Enter: Yes  Hand Rails : Left  Wheelchair Ramp: No  One/Two Story Residence: Two story  # of Interior Steps: 13  Interior Rails: Left  Living Alone: No  Support Systems: Spouse/Significant Other  Patient Expects to be Discharged to[de-identified] Skilled nursing facility  Current DME Used/Available at Home: None  Tub or Shower Type: Shower    EXAMINATION/PRESENTATION/DECISION MAKING:   Critical Behavior:  Neurologic State: Alert  Orientation Level: Oriented X4  Cognition: Appropriate decision making, Appropriate safety awareness, Follows commands  Safety/Judgement: Awareness of environment, Fall prevention, Insight into deficits  Hearing: Auditory  Auditory Impairment: Hard of hearing, bilateral  Skin:  intact  Edema: none  Range Of Motion:  AROM: Within functional limits           PROM: Within functional limits           Strength:    Strength: Generally decreased, functional    Functional Mobility:    Transfers:  Sit to Stand: Supervision  Stand to Sit: Supervision       Balance:   Sitting: Intact  Standing: With support; Impaired  Standing - Static: Good  Standing - Dynamic : Fair;Constant support  Ambulation/Gait Training:  Distance (ft): 150 Feet (ft) (x2)  Assistive Device: Gait belt;Walker, rollator;Walker, rolling  Ambulation - Level of Assistance: Supervision        Gait Abnormalities: Decreased step clearance        Base of Support: Widened     Speed/Bette: Pace decreased (<100 feet/min)     Stairs:  Number of Stairs Trained: 2 (x6)  Stairs - Level of Assistance: Contact guard assistance   Rail Use: Both        Functional Measure:  Tinetti test:    Sitting Balance: 1  Arises: 1  Attempts to Rise: 2  Immediate Standing Balance: 2  Standing Balance: 1  Nudged: 2  Eyes Closed: 0  Turn 360 Degrees - Continuous/Discontinuous: 1  Turn 360 Degrees - Steady/Unsteady: 1  Sitting Down: 1  Balance Score: 12 Balance total score  Indication of Gait: 1  R Step Length/Height: 1  L Step Length/Height: 1  R Foot Clearance: 1  L Foot Clearance: 1  Step Symmetry: 1  Step Continuity: 1  Path: 1  Trunk: 1  Walking Time: 1  Gait Score: 10 Gait total score  Total Score: 22/28 Overall total score         Tinetti Tool Score Risk of Falls  <19 = High Fall Risk  19-24 = Moderate Fall Risk  25-28 = Low Fall Risk  Tinetti ME. Performance-Oriented Assessment of Mobility Problems in Elderly Patients. Day 66; [id number]. (Scoring Description: PT Bulletin Feb. 10, 1993)    Older adults: Óscar Huber et al, 2009; n = 1000 [facility] elderly evaluated with ABC, CROW, ADL, and IADL)  · Mean CROW score for males aged 69-68 years = 26.21(3.40)  · Mean CROW score for females age 69-68 years = 25.16(4.30)  · Mean CROW score for males over 80 years = 23.29(6.02)  · Mean CROW score for females over 80 years = 17.20(8.32)           Activity Tolerance:   Fair    After treatment patient left in no apparent distress:   Sitting in chair, Call bell within reach, and Bed / chair alarm activated    COMMUNICATION/EDUCATION:   The patients plan of care was discussed with: Occupational therapist, Registered nurse, and Case management. Fall prevention education was provided and the patient/caregiver indicated understanding., Patient/family have participated as able in goal setting and plan of care. , and Patient/family agree to work toward stated goals and plan of care.    Thank you for this referral.  Lola Gutierrez, PT   Time Calculation: 25 mins

## 2023-01-11 NOTE — PROGRESS NOTES
[address] [facility] in [place] [address] [facility]        LVAD/Heart Transplant workup initiated per Chanelle Suero request. Appropriate labs, imaging and consultsultation orders placed per Chanelle Suero (verbal order read back). Met with patient at bedside, explained role of LVAD coordinator and evaluation process including labs and imaging. Patient denied questions at this time. He was agreeable to workup.

## 2023-01-11 NOTE — PROGRESS NOTES
Problem: Self Care Deficits Care Plan (Adult)  Goal: *Acute Goals and Plan of Care (Insert Text)  Description: FUNCTIONAL STATUS PRIOR TO ADMISSION: Patient was independent and active without use of DME. 2nd readmission in past month. Was completing ADLs and IADls without difficulty, went home on RA. Reports having difficulty doing stairs 2/2 increased SOB resulting in readmission. HOME SUPPORT: The patient lived with spouse but did not require assist.    Occupational Therapy Goals  Initiated 12/24/2022; continue goals 12/29, upgraded as appropriate 1/11/2022  1. Patient will perform upper body dressing with supervision/set-up within 7 day(s). (Upgraded to independence 1/11)  2. Patient will perform lower body dressing with supervision/set-up within 7 day(s). (Upgraded to independence 1/11)  3. Patient will perform bathing with supervision/set-up within 7 day(s). (Continued 1/11)  4. Patient will perform toilet transfers with supervision/set-up within 7 day(s). (Continued 1/11)  5. Patient will perform all aspects of toileting with supervision/set-up within 7 day(s). (Continued 1/11)  6. Patient will participate in upper extremity therapeutic exercise/activities with supervision/set-up for 5 minutes within 7 day(s). (Continued 1/11)   7. Patient will utilize energy conservation techniques during functional activities with verbal cues within 7 day(s). (Continued 1/11)  Outcome: Progressing Towards Goal   OCCUPATIONAL THERAPY TREATMENT  Patient: Anna Webber (76 y.o. male)  Date: 1/11/2023  Diagnosis: [facility]is ([facility] [A41.9] <principal problem not specified>  Procedure(s) (LRB):  RIGHT HEART CATH (N/A) 2 Days Post-Op  Precautions: Fall  Chart, occupational therapy assessment, plan of care, and goals were reviewed. ASSESSMENT  Patient continues with skilled OT services and is progressing towards goals. Patient received reclined in bed, amenable to session.  Continues to make steady functional gains, able to complete bed mobility with overall supervision. Initial BP drop, asymptomatic, however resolved with standign exercises and transfer to beside chair. Completed UE/LE exercises in chair with continued improvements in BP. At this point, patient likely to benefit from discharge home with [facility] assist to maximize ADL independence and safety. Current Level of Function Impacting Discharge (ADLs): up to CGA ADL/mobility    Other factors to consider for discharge: below baseline, continued orthostatic hypotension that improves with activity, overall impaired endurance         PLAN :  Patient continues to benefit from skilled intervention to address the above impairments. Continue treatment per established plan of care to address goals. Recommend with staff: OOB 3x daily for meals, functional mobility to bathroom    Recommend next OT session: OOB ADL, improve activity tolerance    Recommendation for discharge: (in order for the patient to meet his/her long term goals)  Occupational therapy at least 2 days/week in the home AND ensure assist and/or supervision for safety with ADL/IADL    This discharge recommendation:  Has not yet been discussed the attending provider and/or case management    IF patient discharges home will need the following DME: TBD pending progress       SUBJECTIVE:   Patient stated I do feel like I'm getting better.     OBJECTIVE DATA SUMMARY:   Cognitive/Behavioral Status:  Neurologic State: Alert  Orientation Level: Oriented X4  Cognition: Appropriate decision making; Appropriate safety awareness; Follows commands  Perception: Appears intact  Perseveration: No perseveration noted  Safety/Judgement: Awareness of environment; Fall prevention; Insight into deficits    Functional Mobility and Transfers for ADLs:  Bed Mobility:  Supine to Sit: Supervision  Sit to Supine: Supervision    Transfers:  Sit to Stand: Supervision     Bed to Chair: Supervision    Balance:  Sitting: Intact  Standing: With support; Impaired  Standing - Static: Good  Standing - Dynamic : Fair;Constant support    ADL Intervention:       Grooming  Washing Face: Set-up                        Lower Body Dressing Assistance  Position Performed: Seated edge of bed         Cognitive Retraining  Safety/Judgement: Awareness of environment; Fall prevention; Insight into deficits      Pain:  None reported    Activity Tolerance:   Fair and requires rest breaks    After treatment patient left in no apparent distress:   Sitting in chair, Call bell within reach, Bed / chair alarm activated, and Caregiver / family present    COMMUNICATION/COLLABORATION:   The patients plan of care was discussed with: Physical therapist and Registered nurse.      Chicho Day OT  Time Calculation: 27 mins

## 2023-01-11 NOTE — PROGRESS NOTES
Transitions of Care Plan  RUR: 24% - high  Clinical Update:   Consults: [facility]; Therapy; Cardiology  Baseline: independent; resides w wife  Barriers to Discharge: medical  Disposition:  Home Health: [facility] if not on inotrope; anticipate patient will need alternate agency if on inotrope at discharge  Estimated Discharge Date: 01/15/23    CM spoke with patient's son, Leonardo Trejo ([phone]: [phone]), re disposition plan. Agreeable to home health once stable. CM advised inotrope may indicate the need to change home health agencies and he acknowledged understanding of same. Discussed DME - rollator ordered by therapy and CM to send to [facility] for review under Medicare. Patient does not qualify for stair/chair lift or hospital bed under Medicare criteria. PM Update [facility] NP advised CM that patient will begin LVAD workup. Milrinone ordered. CM will continue to follow.     Disposition Plan:  Home Health  Transportation: Family  DME: Rollator - ordered today    Sherlene Sacks, [address]  Available via [address] [place] [address] or

## 2023-01-11 NOTE — WOUND CARE
WOCN Note:     Follow-up visit for right heel. Chart shows:  Admitted on 12/22/22. Admitted for sepsis, cardiogenic shock, respiratory failure. History of MI, CABG x3, DM, CHF. Assessment:   Conversational with family member in room. Able to turn independently onto left side. left heel intact and without erythema. Heels offloaded with pillows. Buttocks and sacrum intact without erythema;sacral foam dressing in use and new one applied. 1. POA right heel deep tissue injury  2.7 x 2.7 x 0 cm; slightly smaller  100% non-blanching purple; no blistering or skin sliding or induration or fluctuance  Tx: venelex already in use and reapplied; heels offloaded with pillows     Wound Recommendations:    Continue venelex as ordered twice daily to heels     PI Prevention:  Turn/reposition approximately every 2 hours  Offload heels with heels hanging off end of pillow at all times while in bed. Sacral Foam dressing: lift to assess regularly; change as needed. Discontinue if incontinence is frequently soiling dressing. Low Air Loss mattress: Use only flat sheet and one incontinence pad. No new information to update provider.       Transition of Care: Plan to follow weekly and as needed while admitted to hospital.      Adalgisa Dwyer, GALILEON, RN, [facility]  Certified Wound, Ostomy, Continence Nurse  office [phone]  Available via [facility]

## 2023-01-11 NOTE — CONSULTS
[address], [address] [place] [address] Katherine  [phone]        Thank you for requesting this consultation but this patient has been seen by [facility] in the past.  We have informed them of this request.    Sincerely,  SCAR Stone

## 2023-01-11 NOTE — PROGRESS NOTES
[address] [facility] Adult  Hospitalist Group                                                                                          Hospitalist Progress Note  Goldie Pitts MD  Answering service: [phone] from in house phone        Date of Service:  2023  NAME:  Roselia Cee  :  1950  MRN:  [id number]      Admission Summary:   Roselia Cee is a 71 y.o. male who presents with progressively worsening shortness of breath for past several days. It has gotten worse to the point that he can only ambulate a few steps due to severe dyspnea and near syncope. Patient also noted abdominal distention which is increasing. Patient with known history of cardiomyopathy and recently admitted for CHF exacerbation a week ago. On presentation to the ER, chest x-ray shows diffuse airspace disease atypical infection versus edema. Also patient was noted to have elevated troponin and BNP. Patient was further evaluated by CT abdomen and pelvis which shows massively distended bladder with some bilateral hydronephrosis, subsequently Vasquez was placed. Patient also with significant leukocytosis as well as tachycardic and elevated lactic acid level and subsequently code sepsis was called. Hospitalist service requested admit the patient for further evaluation management.      The patient denies any headache, blurry vision, sore throat, trouble swallowing, trouble with speech, chest pain, SOB, cough, fever, chills, N/V/D, abd pain, urinary symptoms, constipation, recent travels, sick contacts, focal or generalized neurological symptoms, falls, injuries, rashes, contact with COVID-19 diagnosed patients, hematemesis, melena, hemoptysis, hematuria, rashes, denies starting any new medications and denies any other concerns or problems besides as mentioned above       Interval history / Subjective:     Patient seen and examined status post right heart cath doing well patient walking around the floor no need for dobutamine drip   LVAD evaluation and plan per advanced heart failure team  He looks totally euvolemic creatinine BUN normal electrolytes normal    I discussed care in length with cardiology advanced heart failure team and family at bedside     Assessment & Plan:     Acute on chronic systolic congestive heart failure   Cardiogenic shock due to above - off dbutamine  CAD   S/p CABG   Ischemic Cardiomyopathy      Appreciate help from Heart failure team.   S/p RHC -RHC today with normal to low filling pressures but mildly reduced CI. CI of 2 l/min/m2 off dobutamine. Will await echocardiogram today to evaluate EF again. No evidence of RV dysfunction by RV Dp/Dt or RVSWI. Dobutamine drip is being off  Continue empagliflozin for assistance with HF   Continue Dig for HF assistance. Continue digoxin aspirin Plavix Aldactone  Cannot tolerate beta-blocker or ACE inhibitor due to hypotension     TANNER on CKD stage III   Improving - stable cr  Monitor daily weight , I/O   Bumetanide being held-- clinically  euvolemic   On Aldactone     GERD -chronic   No change in current Rx. Continue PPI     Depression  Hospital delirium  -  Continue Seroquel as is QHS - at 50mg   Continue Remeron as was PTA      T2DM -on Insulin   Controlled Romain diet   Insulin NPH Six units BID      BPH -chronic   Continue Tamsulosin      PAD   S/p Right SFA PTCA -followed by Dr. Aaron Sullivan      Body mass index is 17.39 kg/m².  - severe protein romain malnutrition         Code status: DNR  Prophylaxis: heparin   Care Plan discussed with: pt, RN   Anticipated Disposition: greater than 48 hours,   Being worked up and considered for possible [id number]  [place] [address] Problems  Date Reviewed: 12/5/2022            Codes Class Noted POA    Systolic CHF, acute on chronic ([facility] ICD-10-CM: I50.23  ICD-9-CM: 428.23, 428.0  12/29/2022 Yes        Receiving inotropic medication ICD-10-CM: Z79.899  ICD-9-CM: V49.89  12/29/2022 Unknown        Sepsis ([facility] ICD-10-CM: A41.9  ICD-9-CM: 038.9, 995.91  12/22/2022 Unknown       Review of Systems:   A comprehensive review of systems was negative except for that written in the HPI. Vital Signs:    Last 24hrs VS reviewed since prior progress note. Most recent are:  Visit Vitals  BP (!) 112/46 (BP 1 Location: Left upper arm, BP Patient Position: At rest)   Pulse 89   Temp 98.5 °F (36.9 °C)   Resp 16   Ht 5' 9\" (1.753 m)   Wt 51.8 kg (114 lb 3.2 oz)   SpO2 100%   BMI 16.86 kg/m²         Intake/Output Summary (Last 24 hours) at 1/11/2023 1331  Last data filed at 1/11/2023 1200  Gross per 24 hour   Intake 720 ml   Output 2000 ml   Net -1280 ml          Physical Examination:     I had a face to face encounter with this patient and independently examined them on 1/11/2023 as outlined below:          Constitutional:  No acute distress, , pleasant, frail elderly    ENT:  Oral mucosa dry  oropharynx benign. Resp:  CTA bilaterally. No wheezing/rhonchi/rales. No accessory muscle use. CV:  Regular rhythm, normal rate, no murmurs, gallops, rubs    GI:  Soft, non distended, non tender. Bs+    Musculoskeletal:  No edema, warm, 2+ pulses throughout    Neurologic:  Moves all extremities.   Awake and alert, CN II-XII reviewed            Data Review:    Review and/or order of clinical lab test  Review and/or order of tests in the radiology section of CPT  Review and/or order of tests in the medicine section of CPT      Labs:     Recent Labs     01/11/23 0051 01/09/23  0033   WBC 6.6 9.0   HGB 8.5* 8.7*   HCT 29.4* 29.2*    254       Recent Labs     01/11/23  0051 01/10/23  0306 01/09/23  0033    136 138   K 4.6 4.3 4.6    104 108   CO2 22 24 23   BUN 27* 30* 36*   CREA 1.20 1.14 1.27   * 77 192*   CA 9.2 9.3 9.4   MG 2.2 2.1 2.2   PHOS 3.2 3.5 2.4*       Recent Labs     01/11/23 0051 01/10/23  0306 01/09/23  0033   ALT 69 79* 92*   * 192* 219*   TBILI 0.4 0.4 0.3   TP 6.0* 6.6 6.1*   ALB 3.0* 2.9* 2.9*   GLOB 3.0 3.7 3.2       No results for input(s): INR, PTP, APTT, INREXT, INREXT in the last 72 hours. No results for input(s): FE, TIBC, PSAT, FERR in the last 72 hours. Lab Results   Component Value Date/Time    Folate 10.5 07/03/2018 09:24 AM        No results for input(s): PH, PCO2, PO2 in the last 72 hours. No results for input(s): CPK, CKNDX, TROIQ in the last 72 hours.     No lab exists for component: CPKMB    Lab Results   Component Value Date/Time    Cholesterol, total 143 10/12/2022 09:10 AM    HDL Cholesterol 49 10/12/2022 09:10 AM    LDL, calculated 41.4 10/12/2022 09:10 AM    Triglyceride 263 (H) 10/12/2022 09:10 AM    CHOL/HDL Ratio 2.9 10/12/2022 09:10 AM     Lab Results   Component Value Date/Time    Glucose (POC) 198 (H) 01/11/2023 11:13 AM    Glucose (POC) 111 01/11/2023 06:45 AM    Glucose (POC) 103 01/11/2023 06:27 AM    Glucose (POC) 237 (H) 01/10/2023 09:08 PM    Glucose (POC) 167 (H) 01/10/2023 05:09 PM     Lab Results   Component Value Date/Time    Color YELLOW/STRAW 01/01/2023 09:13 AM    Appearance CLEAR 01/01/2023 09:13 AM    Specific gravity 1.013 01/01/2023 09:13 AM    Specific gravity 1.020 05/03/2014 08:18 AM    pH (UA) 5.5 01/01/2023 09:13 AM    Protein 30 (A) 01/01/2023 09:13 AM    Glucose Negative 01/01/2023 09:13 AM    Ketone Negative 01/01/2023 09:13 AM    Bilirubin Negative 01/01/2023 09:13 AM    Urobilinogen 1.0 01/01/2023 09:13 AM    Nitrites Negative 01/01/2023 09:13 AM    Leukocyte Esterase Negative 01/01/2023 09:13 AM    Epithelial cells FEW 01/01/2023 09:13 AM    Bacteria Negative 01/01/2023 09:13 AM    WBC 0-4 01/01/2023 09:13 AM    RBC 0-5 01/01/2023 09:13 AM         Medications Reviewed:     Current Facility-Administered Medications   Medication Dose Route Frequency    insulin glargine (LANTUS) injection 6 Units  6 Units SubCUTAneous QHS    insulin lispro (HUMALOG) injection 4 Units  4 Units SubCUTAneous TID WITH MEALS    insulin lispro (HUMALOG) injection   SubCUTAneous AC&HS    dextrose 10 % infusion 0-250 mL  0-250 mL IntraVENous PRN    [Held by provider] bumetanide (BUMEX) tablet 1 mg  1 mg Oral DAILY    spironolactone (ALDACTONE) tablet 12.5 mg  12.5 mg Oral DAILY    melatonin tablet 3 mg  3 mg Oral QHS PRN    [Held by provider] DOBUTamine (DOBUTREX) 500 mg/250 mL (2,000 mcg/mL) infusion  1 mcg/kg/min IntraVENous CONTINUOUS    empagliflozin (JARDIANCE) tablet 10 mg  10 mg Oral DAILY    dextrose 10 % infusion 0-250 mL  0-250 mL IntraVENous PRN    albuterol-ipratropium (DUO-NEB) 2.5 MG-0.5 MG/3 ML  3 mL Nebulization Q6H PRN    digoxin (LANOXIN) tablet 0.125 mg  0.125 mg Oral DAILY    mirtazapine (REMERON) tablet 7.5 mg  7.5 mg Oral QHS    clopidogreL (PLAVIX) tablet 75 mg  75 mg Oral DAILY    [Held by provider] potassium chloride SR (KLOR-CON 10) tablet 40 mEq  40 mEq Oral BID    pantoprazole (PROTONIX) tablet 40 mg  40 mg Oral ACB    heparin (porcine) injection 5,000 Units  5,000 Units SubCUTAneous Q12H    QUEtiapine (SEROquel) tablet 50 mg  50 mg Oral QHS    lidocaine 4 % patch 1 Patch  1 Patch TransDERmal Q24H    balsam peru-castor oiL (VENELEX) ointment   Topical BID    alteplase (CATHFLO) 1 mg in sterile water (preservative free) 1 mL injection  1 mg InterCATHeter PRN    bacitracin 500 unit/gram packet 1 Packet  1 Packet Topical PRN    glucose chewable tablet 16 g  4 Tablet Oral PRN    glucagon (GLUCAGEN) injection 1 mg  1 mg IntraMUSCular PRN    senna-docusate (PERICOLACE) 8.6-50 mg per tablet 1 Tablet  1 Tablet Oral BID PRN    bisacodyL (DULCOLAX) suppository 10 mg  10 mg Rectal QHS PRN    sodium chloride (NS) flush 5-40 mL  5-40 mL IntraVENous Q8H    sodium chloride (NS) flush 5-40 mL  5-40 mL IntraVENous PRN    acetaminophen (TYLENOL) tablet 650 mg  650 mg Oral Q6H PRN    Or    acetaminophen (TYLENOL) suppository 650 mg  650 mg Rectal Q6H PRN    polyethylene glycol (MIRALAX) packet 17 g  17 g Oral DAILY PRN    ondansetron (ZOFRAN ODT) tablet 4 mg  4 mg Oral Q8H PRN    Or ondansetron (ZOFRAN) injection 4 mg  4 mg IntraVENous Q6H PRN    aspirin delayed-release tablet 81 mg  81 mg Oral DAILY    ipratropium (ATROVENT) 21 mcg (0.03 %) nasal spray 2 Spray  2 Spray Both Nostrils Q12H    tamsulosin (FLOMAX) capsule 0.4 mg  0.4 mg Oral DAILY    sodium chloride (NS) flush 5-40 mL  5-40 mL IntraVENous PRN     ______________________________________________________________________  EXPECTED LENGTH OF STAY: 5d 0h  ACTUAL LENGTH OF STAY:          [address] MD

## 2023-01-12 ENCOUNTER — APPOINTMENT (OUTPATIENT)
Dept: ULTRASOUND IMAGING | Age: 73
DRG: 871 | End: 2023-01-12
Attending: NURSE PRACTITIONER
Payer: MEDICARE

## 2023-01-12 LAB
ALBUMIN SERPL-MCNC: 3.3 G/DL (ref 3.5–5)
ALBUMIN/GLOB SERPL: 0.9 (ref 1.1–2.2)
ALP SERPL-CCNC: 220 U/L (ref 45–117)
ALT SERPL-CCNC: 69 U/L (ref 12–78)
ANION GAP SERPL CALC-SCNC: 5 MMOL/L (ref 5–15)
APTT PPP: 27.9 SEC (ref 22.1–31)
AST SERPL-CCNC: 30 U/L (ref 15–37)
BACTERIA SPEC CULT: NORMAL
BACTERIA SPEC CULT: NORMAL
BILIRUB SERPL-MCNC: 0.5 MG/DL (ref 0.2–1)
BNP SERPL-MCNC: 5191 PG/ML
BUN SERPL-MCNC: 27 MG/DL (ref 6–20)
BUN/CREAT SERPL: 21 (ref 12–20)
CALCIUM SERPL-MCNC: 9.7 MG/DL (ref 8.5–10.1)
CHLORIDE SERPL-SCNC: 106 MMOL/L (ref 97–108)
CHOLEST SERPL-MCNC: 170 MG/DL
CO2 SERPL-SCNC: 24 MMOL/L (ref 21–32)
CREAT SERPL-MCNC: 1.29 MG/DL (ref 0.7–1.3)
CRP SERPL HS-MCNC: >9.5 MG/L
DIGOXIN SERPL-MCNC: 1.3 NG/ML (ref 0.9–2)
GLOBULIN SER CALC-MCNC: 3.8 G/DL (ref 2–4)
GLUCOSE BLD STRIP.AUTO-MCNC: 114 MG/DL (ref 65–117)
GLUCOSE BLD STRIP.AUTO-MCNC: 239 MG/DL (ref 65–117)
GLUCOSE BLD STRIP.AUTO-MCNC: 253 MG/DL (ref 65–117)
GLUCOSE BLD STRIP.AUTO-MCNC: 329 MG/DL (ref 65–117)
GLUCOSE SERPL-MCNC: 118 MG/DL (ref 65–100)
HDLC SERPL-MCNC: 40 MG/DL
HDLC SERPL: 4.3 (ref 0–5)
HEMOCCULT STL QL: NEGATIVE
LDH SERPL L TO P-CCNC: 189 U/L (ref 85–241)
LDLC SERPL CALC-MCNC: 87 MG/DL (ref 0–100)
LEFT ABI: 0.98
LEFT ARM BP: 121 MMHG
LEFT CCA DIST DIAS: 17.9 CM/S
LEFT CCA DIST SYS: 186.3 CM/S
LEFT CCA PROX DIAS: 23.1 CM/S
LEFT CCA PROX SYS: 186.3 CM/S
LEFT ECA DIAS: 7.5 CM/S
LEFT ECA SYS: 186.3 CM/S
LEFT ICA DIST DIAS: 19.9 CM/S
LEFT ICA DIST SYS: 67.4 CM/S
LEFT ICA MID DIAS: 21.7 CM/S
LEFT ICA MID SYS: 81 CM/S
LEFT ICA PROX DIAS: 25.6 CM/S
LEFT ICA PROX SYS: 113.7 CM/S
LEFT ICA/CCA SYS: 0.6 NO UNITS
LEFT POSTERIOR TIBIAL: 119 MMHG
LEFT VERTEBRAL DIAS: 19.9 CM/S
LEFT VERTEBRAL SYS: 87.5 CM/S
MAGNESIUM SERPL-MCNC: 2.3 MG/DL (ref 1.6–2.4)
PHOSPHATE SERPL-MCNC: 3.5 MG/DL (ref 2.6–4.7)
POTASSIUM SERPL-SCNC: 4.5 MMOL/L (ref 3.5–5.1)
PREALB SERPL-MCNC: 15.8 MG/DL (ref 20–40)
PROT SERPL-MCNC: 7.1 G/DL (ref 6.4–8.2)
RIGHT ABI: 0.98
RIGHT CCA DIST DIAS: 6.4 CM/S
RIGHT CCA DIST SYS: 58.5 CM/S
RIGHT ECA DIAS: 10.1 CM/S
RIGHT ECA SYS: 126.7 CM/S
RIGHT ICA DIST DIAS: 25.4 CM/S
RIGHT ICA DIST SYS: 80.2 CM/S
RIGHT ICA MID DIAS: 18.1 CM/S
RIGHT ICA MID SYS: 76.5 CM/S
RIGHT ICA PROX DIAS: 19.5 CM/S
RIGHT ICA PROX SYS: 107.3 CM/S
RIGHT ICA/CCA SYS: 1.8 NO UNITS
RIGHT POSTERIOR TIBIAL: 118 MMHG
RPR SER QL: NONREACTIVE
SERVICE CMNT-IMP: ABNORMAL
SERVICE CMNT-IMP: NORMAL
SERVICE CMNT-IMP: NORMAL
SODIUM SERPL-SCNC: 135 MMOL/L (ref 136–145)
T3FREE SERPL-MCNC: 2.1 PG/ML (ref 2.2–4)
THERAPEUTIC RANGE,PTTT: NORMAL SECS (ref 58–77)
TRIGL SERPL-MCNC: 215 MG/DL (ref ?–150)
VAS LEFT DORSALIS PEDIS BP: 113 MMHG
VAS RIGHT DORSALIS PEDIS BP: 105 MMHG
VLDLC SERPL CALC-MCNC: 43 MG/DL

## 2023-01-12 PROCEDURE — 85306 CLOT INHIBIT PROT S FREE: CPT

## 2023-01-12 PROCEDURE — 99232 SBSQ HOSP IP/OBS MODERATE 35: CPT | Performed by: NURSE PRACTITIONER

## 2023-01-12 PROCEDURE — 86677 HELICOBACTER PYLORI ANTIBODY: CPT

## 2023-01-12 PROCEDURE — C1751 CATH, INF, PER/CENT/MIDLINE: HCPCS

## 2023-01-12 PROCEDURE — 81240 F2 GENE: CPT

## 2023-01-12 PROCEDURE — 80061 LIPID PANEL: CPT

## 2023-01-12 PROCEDURE — 84481 FREE ASSAY (FT-3): CPT

## 2023-01-12 PROCEDURE — 74011250636 HC RX REV CODE- 250/636: Performed by: NURSE PRACTITIONER

## 2023-01-12 PROCEDURE — 74011250637 HC RX REV CODE- 250/637: Performed by: NURSE PRACTITIONER

## 2023-01-12 PROCEDURE — 80321 ALCOHOLS BIOMARKERS 1OR 2: CPT

## 2023-01-12 PROCEDURE — 74011250637 HC RX REV CODE- 250/637: Performed by: INTERNAL MEDICINE

## 2023-01-12 PROCEDURE — 80162 ASSAY OF DIGOXIN TOTAL: CPT

## 2023-01-12 PROCEDURE — 74011250636 HC RX REV CODE- 250/636: Performed by: STUDENT IN AN ORGANIZED HEALTH CARE EDUCATION/TRAINING PROGRAM

## 2023-01-12 PROCEDURE — 97535 SELF CARE MNGMENT TRAINING: CPT

## 2023-01-12 PROCEDURE — 99222 1ST HOSP IP/OBS MODERATE 55: CPT | Performed by: NURSE PRACTITIONER

## 2023-01-12 PROCEDURE — 74011250637 HC RX REV CODE- 250/637: Performed by: STUDENT IN AN ORGANIZED HEALTH CARE EDUCATION/TRAINING PROGRAM

## 2023-01-12 PROCEDURE — 74011636637 HC RX REV CODE- 636/637: Performed by: CLINICAL NURSE SPECIALIST

## 2023-01-12 PROCEDURE — 74011250637 HC RX REV CODE- 250/637: Performed by: FAMILY MEDICINE

## 2023-01-12 PROCEDURE — 84134 ASSAY OF PREALBUMIN: CPT

## 2023-01-12 PROCEDURE — 85302 CLOT INHIBIT PROT C ANTIGEN: CPT

## 2023-01-12 PROCEDURE — 85613 RUSSELL VIPER VENOM DILUTED: CPT

## 2023-01-12 PROCEDURE — 83615 LACTATE (LD) (LDH) ENZYME: CPT

## 2023-01-12 PROCEDURE — 36415 COLL VENOUS BLD VENIPUNCTURE: CPT

## 2023-01-12 PROCEDURE — 80053 COMPREHEN METABOLIC PANEL: CPT

## 2023-01-12 PROCEDURE — 86592 SYPHILIS TEST NON-TREP QUAL: CPT

## 2023-01-12 PROCEDURE — 76705 ECHO EXAM OF ABDOMEN: CPT

## 2023-01-12 PROCEDURE — 83880 ASSAY OF NATRIURETIC PEPTIDE: CPT

## 2023-01-12 PROCEDURE — 65660000001 HC RM ICU INTERMED STEPDOWN

## 2023-01-12 PROCEDURE — 02HV33Z INSERTION OF INFUSION DEVICE INTO SUPERIOR VENA CAVA, PERCUTANEOUS APPROACH: ICD-10-PCS | Performed by: FAMILY MEDICINE

## 2023-01-12 PROCEDURE — 83051 HEMOGLOBIN PLASMA: CPT

## 2023-01-12 PROCEDURE — 74011000250 HC RX REV CODE- 250: Performed by: STUDENT IN AN ORGANIZED HEALTH CARE EDUCATION/TRAINING PROGRAM

## 2023-01-12 PROCEDURE — 82955 ASSAY OF G6PD ENZYME: CPT

## 2023-01-12 PROCEDURE — 97116 GAIT TRAINING THERAPY: CPT

## 2023-01-12 PROCEDURE — 86022 PLATELET ANTIBODIES: CPT

## 2023-01-12 PROCEDURE — 85730 THROMBOPLASTIN TIME PARTIAL: CPT

## 2023-01-12 PROCEDURE — 36573 INSJ PICC RS&I 5 YR+: CPT | Performed by: NURSE PRACTITIONER

## 2023-01-12 PROCEDURE — 76937 US GUIDE VASCULAR ACCESS: CPT

## 2023-01-12 PROCEDURE — 83735 ASSAY OF MAGNESIUM: CPT

## 2023-01-12 PROCEDURE — 82962 GLUCOSE BLOOD TEST: CPT

## 2023-01-12 PROCEDURE — 84100 ASSAY OF PHOSPHORUS: CPT

## 2023-01-12 PROCEDURE — 86141 C-REACTIVE PROTEIN HS: CPT

## 2023-01-12 RX ORDER — MILRINONE LACTATE 0.2 MG/ML
0.25 INJECTION, SOLUTION INTRAVENOUS CONTINUOUS
Status: DISCONTINUED | OUTPATIENT
Start: 2023-01-12 | End: 2023-01-15

## 2023-01-12 RX ORDER — FINASTERIDE 5 MG/1
5 TABLET, FILM COATED ORAL DAILY
Status: DISCONTINUED | OUTPATIENT
Start: 2023-01-13 | End: 2023-01-13

## 2023-01-12 RX ADMIN — PANTOPRAZOLE SODIUM 40 MG: 40 TABLET, DELAYED RELEASE ORAL at 06:46

## 2023-01-12 RX ADMIN — Medication: at 09:33

## 2023-01-12 RX ADMIN — Medication 4 UNITS: at 18:25

## 2023-01-12 RX ADMIN — IPRATROPIUM BROMIDE 2 SPRAY: 21 SPRAY, METERED NASAL at 09:34

## 2023-01-12 RX ADMIN — TAMSULOSIN HYDROCHLORIDE 0.4 MG: 0.4 CAPSULE ORAL at 09:33

## 2023-01-12 RX ADMIN — SODIUM CHLORIDE, PRESERVATIVE FREE 10 ML: 5 INJECTION INTRAVENOUS at 21:24

## 2023-01-12 RX ADMIN — SODIUM CHLORIDE, PRESERVATIVE FREE 10 ML: 5 INJECTION INTRAVENOUS at 06:46

## 2023-01-12 RX ADMIN — Medication 4 UNITS: at 13:16

## 2023-01-12 RX ADMIN — DIGOXIN 0.12 MG: 125 TABLET ORAL at 09:33

## 2023-01-12 RX ADMIN — HEPARIN SODIUM 5000 UNITS: 5000 INJECTION INTRAVENOUS; SUBCUTANEOUS at 21:23

## 2023-01-12 RX ADMIN — IPRATROPIUM BROMIDE 2 SPRAY: 21 SPRAY, METERED NASAL at 21:24

## 2023-01-12 RX ADMIN — Medication 2 UNITS: at 21:23

## 2023-01-12 RX ADMIN — Medication 3 MG: at 21:29

## 2023-01-12 RX ADMIN — Medication 3 UNITS: at 13:15

## 2023-01-12 RX ADMIN — SODIUM CHLORIDE, PRESERVATIVE FREE 10 ML: 5 INJECTION INTRAVENOUS at 13:16

## 2023-01-12 RX ADMIN — EMPAGLIFLOZIN 10 MG: 10 TABLET, FILM COATED ORAL at 09:33

## 2023-01-12 RX ADMIN — ACETAMINOPHEN 650 MG: 325 TABLET ORAL at 21:29

## 2023-01-12 RX ADMIN — MILRINONE LACTATE IN DEXTROSE 0.25 MCG/KG/MIN: 200 INJECTION, SOLUTION INTRAVENOUS at 18:26

## 2023-01-12 RX ADMIN — SPIRONOLACTONE 12.5 MG: 25 TABLET ORAL at 09:33

## 2023-01-12 RX ADMIN — QUETIAPINE FUMARATE 50 MG: 25 TABLET ORAL at 21:23

## 2023-01-12 RX ADMIN — ASPIRIN 81 MG: 81 TABLET, COATED ORAL at 09:33

## 2023-01-12 RX ADMIN — CLOPIDOGREL BISULFATE 75 MG: 75 TABLET ORAL at 09:33

## 2023-01-12 RX ADMIN — HEPARIN SODIUM 5000 UNITS: 5000 INJECTION INTRAVENOUS; SUBCUTANEOUS at 09:33

## 2023-01-12 RX ADMIN — MIRTAZAPINE 7.5 MG: 15 TABLET, FILM COATED ORAL at 21:23

## 2023-01-12 RX ADMIN — INSULIN GLARGINE 6 UNITS: 100 INJECTION, SOLUTION SUBCUTANEOUS at 21:23

## 2023-01-12 NOTE — PROGRESS NOTES
Spiritual Care Assessment/Progress Note  [facility]      NAME: Jem Dillard      MRN: [id number]  AGE: 71 y.o.  SEX: male  Orthodoxy Affiliation: Hinduism   Language: English     1/12/2023     Total Time (in minutes): 23     Spiritual Assessment begun in [facility] UNIT through conversation with:         [x]Patient        [] Family    [] Friend(s)        Reason for Consult: Palliative Care, Initial/Spiritual Assessment     Spiritual beliefs: (Please include comment if needed)     [x] Identifies with a lisa tradition: Hinduism         [] Supported by a lisa community:            [] Claims no spiritual orientation:           [] Seeking spiritual identity:                [] Adheres to an individual form of spirituality:           [] Not able to assess:                           Identified resources for coping:      [] Prayer                               [] Music                  [] Guided Imagery     [x] Family/friends                 [] Pet visits     [] Devotional reading                         [] Unknown     [] Other:                                               Interventions offered during this visit: (See comments for more details)    Patient Interventions: Affirmation of emotions/emotional suffering, Affirmation of lisa, Coping skills reviewed/reinforced, Catharsis/review of pertinent events in supportive environment           Plan of Care:     [] Support spiritual and/or cultural needs    [] Support AMD and/or advance care planning process      [] Support grieving process   [] Coordinate Rites and/or Rituals    [] Coordination with community clergy   [] No spiritual needs identified at this time   [] Detailed Plan of Care below (See Comments)  [] Make referral to Music Therapy  [] Make referral to Pet Therapy     [] Make referral to Addiction services  [] Make referral to [facility]  [] Make referral to Spiritual Care Partner  [] No future visits requested        [x] Follow up visits as needed     Visited patient for palliative initial spiritual assessment. His chart was consulted. Listened as he spoke of his protracted hospital stay and his anticipated treatment. He lives with his wife and has a son who lives in [place] and a daughter who lives in [place]. He reports good support from his family. He is a person of Roman Catholic lisa and derives a sense of hope and support from his lisa.   Chaplain Hernandez MDiv, MS, [facility]

## 2023-01-12 NOTE — PROGRESS NOTES
[facility] in [address]      Patient name: Moses Benson  Patient : 1950  Patient MRN: [id number]  Date of service: 23    QUESTIONNAIRE:  Do you have medical insurance? yes  What is your primary insurance? medicaid  What is your secondary insurance? Medicare, [facility]  What is your coverage for medications? Medicaid  What is your insurance coverage for medical supplies? Dual Medi/Medi  Closest heart transplant facilities in net-work:  Are you currently employed? Retired. And wife is retired. 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 Pt. Wife has SSDI  Do you currently have any concerns with being able to cover any health-related expenses? No  Do you currently have any concerns about access to the following resources: food, cost of housing, electric bills, telephone bills/services, medication costs, insurance premium or copays, transportation or other. Budget is tight. No outstanding bills. RECOMMENDATIONS: explore grants for financial aide for living expenses. They receive food stamps,  emergency heating bills, and Medicaid.          [address] [facility]   [address] [facility], [address]  Phone: [phone]

## 2023-01-12 NOTE — PROCEDURES
PICC Placement Note    PRE-PROCEDURE VERIFICATION  Correct Procedure: yes  Correct Site:  yes  Temperature: Temp: 98 °F (36.7 °C), Temperature Source: Temp Source: Oral  Recent Labs     01/12/23  0500 01/11/23  1814 01/11/23  0051   BUN 27*  --  27*   CREA 1.29  --  1.20   PLT  --   --  276   INR  --  1.0  --    WBC  --   --  6.6     Allergies: Patient has no known allergies. Education materials, including PICC Booklet, for PICC Care given to patient: yes. See Patient Education activity for further details. PROCEDURE DETAIL  A double lumen PICC line was started for Home IV Therapy. The following documentation is in addition to the PICC properties in the lines/airways flowsheet :  Lot #: [id number]  Was xylocaine 1% used intradermally:  yes  Total catheter length: 34 (cm)  External catheter length:  0 (cm)  Vein Selection for PICC:right basilic  Central Line Bundle followed yes  Complication Related to Insertion: none    The placement was verified by ECG/Sapiens technology: The  tip location is in the superior vena cava. See ECG results for PICC tip placement. Report given to Jade Celaya is okay to use.      Terence Bello RN

## 2023-01-12 NOTE — PROGRESS NOTES
Problem: Self Care Deficits Care Plan (Adult)  Goal: *Acute Goals and Plan of Care (Insert Text)  Description: FUNCTIONAL STATUS PRIOR TO ADMISSION: Patient was independent and active without use of DME. 2nd readmission in past month. Was completing ADLs and IADls without difficulty, went home on RA. Reports having difficulty doing stairs 2/2 increased SOB resulting in readmission. HOME SUPPORT: The patient lived with spouse but did not require assist.    Occupational Therapy Goals  Initiated 12/24/2022; continue goals 12/29, upgraded as appropriate 1/11/2022  1. Patient will perform upper body dressing with supervision/set-up within 7 day(s). (Upgraded to independence 1/11)  2. Patient will perform lower body dressing with supervision/set-up within 7 day(s). (Upgraded to independence 1/11)  3. Patient will perform bathing with supervision/set-up within 7 day(s). (Continued 1/11)  4. Patient will perform toilet transfers with supervision/set-up within 7 day(s). (Continued 1/11)  5. Patient will perform all aspects of toileting with supervision/set-up within 7 day(s). (Continued 1/11)  6. Patient will participate in upper extremity therapeutic exercise/activities with supervision/set-up for 5 minutes within 7 day(s). (Continued 1/11)   7. Patient will utilize energy conservation techniques during functional activities with verbal cues within 7 day(s). (Continued 1/11)  Outcome: Progressing Towards Goal   OCCUPATIONAL THERAPY TREATMENT  Patient: Leandra Carver (76 y.o. male)  Date: 1/12/2023  Diagnosis: Sepsis ([facility] [A41.9] <principal problem not specified>  Procedure(s) (LRB):  RIGHT HEART CATH (N/A) 3 Days Post-Op  Precautions: Fall  Chart, occupational therapy assessment, plan of care, and goals were reviewed. ASSESSMENT  Patient continues with skilled OT services and is progressing towards goals. Patient received reclined in bedside chair, amenable to session.  Completed transfers and functional mobility with overall CGA, used rollator for mobility with min verbal cues on correct use of brakes and manipulation of rollator in smaller spaces. Discussed ADL/environmental modifications to maximize ADL independence/safety at home, patient verbalized understanding. Patient with VSS throughout session, soft BP however end of session /44, asymptomatic of any orthostatic hypotension. Current Level of Function Impacting Discharge (ADLs): up to [facility] ADL/mobility with rollator    Other factors to consider for discharge: below baseline, generalized deconditioning, LVAD workup         PLAN :  Patient continues to benefit from skilled intervention to address the above impairments. Continue treatment per established plan of care to address goals. Recommend with staff: OOB 3x daily for meals, functional mobility to bathroom    Recommend next OT session: OOB ADL, HEP    Recommendation for discharge: (in order for the patient to meet his/her long term goals)  Occupational therapy at least 2 days/week in the home AND ensure assist and/or supervision for safety with ADL/IADL    This discharge recommendation:  Has not yet been discussed the attending provider and/or case management    IF patient discharges home will need the following DME: TBD pending progress       SUBJECTIVE:   Patient stated I'm feeling much much better.     OBJECTIVE DATA SUMMARY:   Cognitive/Behavioral Status:  Neurologic State: Alert  Orientation Level: Oriented X4  Cognition: Appropriate decision making; Appropriate safety awareness; Follows commands  Perception: Appears intact  Perseveration: No perseveration noted  Safety/Judgement: Awareness of environment; Fall prevention; Insight into deficits    Functional Mobility and Transfers for ADLs:      Transfers:  Sit to Stand: Supervision          Balance:  Sitting: Intact  Standing: Impaired; With support  Standing - Static: Good  Standing - Dynamic : Constant support;Good    ADL Intervention: Grooming  Grooming Assistance: Supervision  Position Performed: Standing  Washing Face: Supervision  Brushing Teeth: Supervision                   Upper Body [address] Gown: Supervision    Lower Body Dressing Assistance  Socks: Supervision  Position Performed: Seated in chair         Cognitive Retraining  Safety/Judgement: Awareness of environment; Fall prevention; Insight into deficits      Pain:  None reported    Activity Tolerance:   Fair and requires rest breaks    After treatment patient left in no apparent distress:   Sitting in chair, Call bell within reach, Bed / chair alarm activated, and Side rails x 3    COMMUNICATION/COLLABORATION:   The patients plan of care was discussed with: Physical therapist and Registered nurse.      Carmine Moreno OT  Time Calculation: 38 mins

## 2023-01-12 NOTE — CONSULTS
New Urology Consult Note    Patient: Tyron Red MRN: [id number]  SSN: [id number]    YOB: 1950  Age: 71 y.o. Sex: male            Assessment:   CHF exacerbation   Gross hematuria   Acute urinary retention       Recommendations:     1. Gross hematuria - resolved cont ot hold Plavix till tomorrow if urine still clear can resume . 2, Retention -we will need to maintain catheter till discharge then outpatient follow-up with [facility]. Cannot add tamsulosin since pressures are little soft,. Will add finasteride. Op follow up with [facility]   Thank you for this consult. Please contact [facility] with any further questions/concerns. History of Present Illness:     Reason for Consult:  gross hematuria     Tyron Red is seen in consultation for reasons noted above at the request of Liam Garcia MD.    This is a 71 y.o. male with a history of of cad s/p cabg, ischemic cardiomyopathy, hfref, htn, ckd iii, pvd, dm ii . Patient was initially seen by urology on 12/27 for massively distended bladder noted on CT . Vasquez was placed at that time for decompression for 7 to 10 days . Chart review shows the Vasquez catheter was removed however on 1/4 patient had retention of 700 mL Vasquez catheter was eventually replaced . Urology is again consulted for gross hematuria noted in Vasquez . [facility]. Patient is on chronic Plavix for history of CAD. Urine noted in Vasquez clear yellow in tubing in the bag urine is light pink. Patient states hematuria resolved today. CT reviewed from 12/22/2022 . Notes mildly enlarged prostate patient at that time had bilateral mild hydronephrosis . Renal ultrasound was done on 12/2622 which showed resolution of bilateral hydronephrosis . dDscussed with patient. Outpatient urology follow-up for hematuria work-up .      Subjective     Past Medical History  Past Medical History: Diagnosis Date    CAD (coronary artery disease) 11/10/2016    NSTEMI & 2 stents    Deafness 10/28/2012    DM (diabetes mellitus) ([facility] [address]     Elevated cholesterol     Hypertension     NSTEMI (non-ST elevated myocardial infarction) ([facility] [address] 11/10/2016       Past Surgical History:   Past Surgical History:   Procedure Laterality Date    COLONOSCOPY N/A 6/28/2018    COLONOSCOPY performed by Russ Duvall MD at [address]  11/11/2016    2 stents       Medication:  Current Facility-Administered Medications   Medication Dose Route Frequency Provider Last Rate Last Admin    milrinone (PRIMACOR) 20 MG/100 ML D5W infusion  0.25 mcg/kg/min IntraVENous CONTINUOUS Lizette Linton NP 3.9 mL/hr at 01/12/23 1323 0.25 mcg/kg/min at 01/12/23 1323    insulin glargine (LANTUS) injection 6 Units  6 Units SubCUTAneous QHS Cathy Sheldon CNS   6 Units at 01/11/23 2146    insulin lispro (HUMALOG) injection 4 Units  4 Units SubCUTAneous TID WITH MEALS SLICK Jaime   4 Units at 01/12/23 1316    insulin lispro (HUMALOG) injection   SubCUTAneous AC&HS Leonel Marrufo CNS   3 Units at 01/12/23 1315    dextrose 10 % infusion 0-250 mL  0-250 mL IntraVENous PRN Leonel Marrufo CNS        spironolactone (ALDACTONE) tablet 12.5 mg  12.5 mg Oral DAILY Lizette Linton NP   12.5 mg at 01/12/23 0933    melatonin tablet 3 mg  3 mg Oral QHS PRN Jodee Sandra NP   3 mg at 01/10/23 2144    empagliflozin (JARDIANCE) tablet 10 mg  10 mg Oral DAILY Nehemias Heaton MD   10 mg at 01/12/23 0933    dextrose 10 % infusion 0-250 mL  0-250 mL IntraVENous PRN LSICK Jaime        albuterol-ipratropium (DUO-NEB) 2.5 MG-0.5 MG/3 ML  3 mL Nebulization Q6H PRN Lashell Clark MD        digoxin (LANOXIN) tablet 0.125 mg  0.125 mg Oral DAILY Colleen Astorga NP   0.125 mg at 01/12/23 0933    mirtazapine (REMERON) tablet 7.5 mg  7.5 mg Oral QHS Jeff HARTLEY MD   7.5 mg at 01/11/23 2147    clopidogreL (PLAVIX) tablet 75 mg  75 mg Oral DAILY Vale Mccormick, JACOB   75 mg at 01/12/23 0933    [Held by provider] potassium chloride SR (KLOR-CON 10) tablet 40 mEq  40 mEq Oral BID Ramos MOLINA MD   40 mEq at 01/03/23 0838    pantoprazole (PROTONIX) tablet 40 mg  40 mg Oral ACB Estel Shames G, DO   40 mg at 01/12/23 0646    heparin (porcine) injection 5,000 Units  5,000 Units SubCUTAneous Q12H Arletta Lat, DO   5,000 Units at 01/12/23 4564    QUEtiapine (SEROquel) tablet 50 mg  50 mg Oral QHS Arletta Lat, DO   50 mg at 01/11/23 2147    lidocaine 4 % patch 1 Patch  1 Patch TransDERmal Q24H Mikhail Ba MD   1 Patch at 01/11/23 1734    balsam peru-castor oiL (VENELEX) ointment   Topical BID Audrey Joseph MD   Given at 01/12/23 0933    alteplase (CATHFLO) 1 mg in sterile water (preservative free) 1 mL injection  1 mg InterCATHeter PRN Mikhail Ba MD        bacitracin 500 unit/gram packet 1 Packet  1 Packet Topical PRN Mikhail Ba MD        glucose chewable tablet 16 g  4 Tablet Oral PRN Mikhail Ba MD        glucagon (GLUCAGEN) injection 1 mg  1 mg IntraMUSCular PRN Mikhail Ba MD        senna-docusate (PERICOLACE) 8.6-50 mg per tablet 1 Tablet  1 Tablet Oral BID PRALY Ba MD   1 Tablet at 12/26/22 0619    bisacodyL (DULCOLAX) suppository 10 mg  10 mg Rectal QHS PRN Ramos MOLINA MD        sodium chloride (NS) flush 5-40 mL  5-40 mL IntraVENous Q8H Walker Aponte MD   10 mL at 01/12/23 1316    sodium chloride (NS) flush 5-40 mL  5-40 mL IntraVENous PRN Ramos MOLINA MD   10 mL at 12/28/22 0845    acetaminophen (TYLENOL) tablet 650 mg  650 mg Oral Q6H PRN Ramos MOLINA MD   650 mg at 01/05/23 2234    Or    acetaminophen (TYLENOL) suppository 650 mg  650 mg Rectal Q6H PRN Mikhail Ba MD        polyethylene glycol (MIRALAX) packet 17 g  17 g Oral DAILY PRN Riaz Ken MD   17 g at 12/26/22 0649    ondansetron (ZOFRAN ODT) tablet 4 mg  4 mg Oral Q8H PRN Riaz Ken MD        Or    ondansetron [facility]) injection 4 mg  4 mg IntraVENous Q6H PRN Sherie MOLINA MD   4 mg at 12/24/22 2024    aspirin delayed-release tablet 81 mg  81 mg Oral DAILY Ranjit Pearson MD   81 mg at 01/12/23 0933    ipratropium (ATROVENT) 21 mcg (0.03 %) nasal spray 2 Spray  2 Spray Both Nostrils Q12H Francois Baker MD   2 [place] at 01/12/23 0934    tamsulosin (FLOMAX) capsule 0.4 mg  0.4 mg Oral DAILY Francois Baker MD   0.4 mg at 01/12/23 6294    sodium chloride (NS) flush 5-40 mL  5-40 mL IntraVENous PRN Riaz Ken MD           Allergies:  No Known Allergies    Social History:  Social History     Tobacco Use    Smoking status: Never     Passive exposure: Never    Smokeless tobacco: Never   Vaping Use    Vaping Use: Never used   Substance Use Topics    Alcohol use: Yes     Alcohol/week: 2.0 standard drinks     Types: 1 Cans of beer, 1 Shots of liquor per week     Comment: rarely    Drug use: No       Family History  Family History   Problem Relation Age of Onset    Heart Disease Father     Heart Attack Father     Hypertension Mother     Elevated Lipids Brother     Elevated Lipids Brother     No Known Problems Sister     Elevated Lipids Brother     No Known Problems Son     No Known Problems Daughter     Anesth Problems Neg Hx        Review of Systems  ROS from attending provider note from H&P reviewed and changes (other than per HPI) include : none. Objective:     Vital signs in last 24 hours:  Visit Vitals  BP (!) 97/45   Pulse (!) 114   Temp 98 °F (36.7 °C)   Resp 16   Ht 5' 9\" (1.753 m)   Wt 43 kg (94 lb 12.8 oz)   SpO2 97%   BMI 14.00 kg/m²       Intake/Output last 3 shifts:  Date 01/11/23 0700 - 01/12/23 0659 01/12/23 0700 - 01/13/23 0659   Shift 7410-1769 2708-0239 24 Hour Total 3642-8893 0997-6075 24 Hour Total   INTAKE   P.O. 770 100 870        P. O. 770 100 870      I.V.(mL/kg/hr) 0(0) 94.5(0.2) 94.5(0.1)        I.V.  0 0        DOBUTamine Volume  70.7 70.7        Milrinone Volume 0 23.9 23.9      Blood  0 0        Autotransfused  0 0      Other  0 0        Other  0 0        Irrigation Volume Input (mL) (Urinary Catheter 01/08/23 [place])  0 0      Shift Total(mL/kg) 770(14.5) 194.5(4.5) 964.5(22.4)      OUTPUT   Urine(mL/kg/hr) 1725(2.7) 1800(3.5) 3525(3.4) 300  300     Urine Output (mL) (Urinary Catheter 01/08/23 [place]) 1725 1800 3525 300  300   Emesis/NG output  0 0        Emesis  0 0        Emesis Occurrence(s)  0 x 0 x      Other  0 0        Other Output  0 0      Stool  0 0        Stool Occurrence(s)  1 x 1 x        Stool  0 0      Blood  0 0        Quantitative Blood Loss  0 0        Blood  0 0      Shift Total(mL/kg) 1725(32.4) 1800(41.9) 3525(82) 300(7)  300(7)   NET -955 -1605.5 -2560.5 -300  -300   Weight (kg) 53.2 43 43 43 43 43         Physical Exam  General: NAD, A&O,   HEENT: lids normal, no tracheal deviation, no lesions or deformities  Pulmonary: Normal work of breathing   Abdomen: soft, NTTP, nondistended, no suprapubic fullness or tenderness  : no flank pain , no CVA tenderness  Gait: not observed  Neuro: Appropriate, no focal neurological deficits  Mood/Affect: normal    Lab/Imaging Review:       Most Recent Labs:  Lab Results   Component Value Date/Time    WBC 6.6 01/11/2023 12:51 AM    HGB 8.5 (L) 01/11/2023 12:51 AM    HCT 29.4 (L) 01/11/2023 12:51 AM    PLATELET 145 28/02/1133 12:51 AM    MCV 80.5 01/11/2023 12:51 AM        Lab Results   Component Value Date/Time    Sodium 135 (L) 01/12/2023 05:00 AM    Potassium 4.5 01/12/2023 05:00 AM    Chloride 106 01/12/2023 05:00 AM    CO2 24 01/12/2023 05:00 AM    Anion gap 5 01/12/2023 05:00 AM    Glucose 118 (H) 01/12/2023 05:00 AM    BUN 27 (H) 01/12/2023 05:00 AM    Creatinine 1.29 01/12/2023 05:00 AM    BUN/Creatinine ratio 21 (H) 01/12/2023 05:00 AM    GFR est AA 46 (L) 06/23/2022 09:40 AM    GFR est non-AA 38 (L) 06/23/2022 09:40 AM    Calcium 9.7 01/12/2023 05:00 AM    Bilirubin, total 0.5 01/12/2023 05:00 AM    Alk. phosphatase 220 (H) 01/12/2023 05:00 AM    Protein, total 7.1 01/12/2023 05:00 AM    Albumin 3.3 (L) 01/12/2023 05:00 AM    Globulin 3.8 01/12/2023 05:00 AM    A-G Ratio 0.9 (L) 01/12/2023 05:00 AM    ALT (SGPT) 69 01/12/2023 05:00 AM        Lab Results   Component Value Date/Time    Prostate Specific Ag 3.9 12/27/2022 02:36 PM    Prostate Specific Ag 1.1 02/03/2022 11:47 AM    Prostate Specific Ag 0.7 01/20/2021 08:56 AM        COAGS:    Lab Results   Component Value Date/Time    APTT 27.9 01/12/2023 05:00 AM    PTP 10.8 01/11/2023 06:14 PM    INR 1.0 01/11/2023 06:14 PM       Lab Results   Component Value Date/Time    Hemoglobin A1c 7.7 (H) 01/11/2023 06:14 PM        Lab Results   Component Value Date/Time    CK 87 01/04/2023 02:31 AM    CK - MB 5.3 (H) 11/10/2016 03:44 PM    CK-MB Index 2.5 11/10/2016 03:44 PM    Troponin-I, Qt. <0.05 07/06/2018 02:39 PM          Urine/Blood Cultures:  Results       Procedure Component Value Units Date/Time    CULTURE, MRSA [[id number]] Collected: 01/11/23 1814    Order Status: Sent Specimen: Nasal from Nares Updated: 01/11/23 1926    URINE CULTURE HOLD SAMPLE [[id number]] Collected: 01/01/23 0913    Order Status: Completed Specimen: Urine Updated: 01/01/23 0916     Urine culture hold       Urine on hold in Microbiology dept for 2 days. If unpreserved urine is submitted, it cannot be used for addtional testing after 24 hours, recollection will be required. IMAGING:   Released  Seen     Study Result    Narrative & Impression   Clinical history: bilateral hydronephrosis follow up  INDICATION:   bilateral hydronephrosis follow up  FINDINGS:   Ultrasound examination of the kidneys. Comparison: CT 12/22/2020  RIGHT KIDNEY: measures 9.1 cm. LEFT KIDNEY: measures 10.2 cm. ABDOMINAL AORTA: No significant aneurysmal dilatation.    IVC: Grossly unremarkable   BLADDER: Vasquez catheter in place   Minimal free pelvic fluid is identified. There is no hydronephrosis or perinephric fluid. There no large shadowing renal  calculi however small stones cannot be excluded. IMPRESSION     No significant residual hydronephrosis. Minimal free pelvic fluid. Signed By: Keli Perez NP  - January 12, 2023

## 2023-01-12 NOTE — CONSULTS
[address] [facility]  [address] [address] [address], [address]  [phone]                     GI CONSULTATION NOTE      NAME:  Peterson Buck   :   1950   MRN:   [id number]     Consult Date: 2023     Chief Complaint: LVAD work-up     History of Present Illness:  Patient is a 71 y.o. male with PMH of CAD, DM, NSTEMI, HTN  who is seen in consultation at the request of Rosy Keene NP  for the above mentioned problem. Mr. Liliana Lima has had an lengthy hospitalization due to CHF exacerbation. Left heart cath with diffuse CAD. LVEF 25-30%. After consultation with [facility] he has decided to pursue LVAD. Last colonoscopy    Findings:  Rectum: normal  Sigmoid: normal  Descending Colon: normal  Transverse Colon: 15 mm sessile polyp removed with hot snare in fragments after  submucosal injection of 2 ml of normal saline. Four hemoclips were placed at  polypectomy site. Ascending Colon: normal  Cecum: normal        PMH:  Past Medical History:   Diagnosis Date    CAD (coronary artery disease) 11/10/2016    NSTEMI & 2 stents    Deafness 10/28/2012    DM (diabetes mellitus) ([facility] [address]     Elevated cholesterol     Hypertension     NSTEMI (non-ST elevated myocardial infarction) ([facility] [address] 11/10/2016       PSH:  Past Surgical History:   Procedure Laterality Date    COLONOSCOPY N/A 2018    COLONOSCOPY performed by Alida Chase MD at [address] [place] [address]  2016    2 stents       Allergies:  No Known Allergies    Home Medications:  Prior to Admission Medications   Prescriptions Last Dose Informant Patient Reported? Taking? Januvia 50 mg tablet 2022  No Yes   Sig: Take 1 tablet by mouth once daily   aspirin delayed-release 81 mg tablet 2022  Yes Yes   Sig: Take 1 Tab by mouth. clopidogreL (Plavix) 75 mg tab 2022  No Yes   Sig: Take 1 Tablet by mouth daily for 360 days.  Indications: a sudden worsening of angina called acute coronary syndrome   dapagliflozin (FARXIGA) 10 mg tab tablet 2022  No Yes   Sig: Take 1 Tablet by mouth daily. ergocalciferol (ERGOCALCIFEROL) 1,250 mcg (50,000 unit) capsule 2022  No Yes   Sig: Take 1 Capsule by mouth every seven (7) days. furosemide (LASIX) 20 mg tablet 2022  No Yes   Sig: Take 1 Tablet by mouth two (2) times a day. glipiZIDE (GLUCOTROL) 5 mg tablet 2022  No Yes   Sig: Take 1 tablet by mouth twice daily   ipratropium (ATROVENT) 21 mcg (0.03 %) nasal spray 2022  No Yes   Si Sprays by Both Nostrils route every twelve (12) hours. ivabradine (CORLANOR) 5 mg tablet 2022  No Yes   Sig: Take 0.5 Tablets by mouth two (2) times daily (with meals). nitroglycerin (NITROSTAT) 0.4 mg SL tablet Unknown  No No   Si Tablet by SubLINGual route every five (5) minutes as needed for Chest Pain. Up to 3 doses. polyethylene glycol (MIRALAX) 17 gram/dose powder 2022  No Yes   Sig: Take 17 g by mouth daily. rosuvastatin (CRESTOR) 20 mg tablet 2022  No Yes   Sig: Take 1 Tablet by mouth nightly.    tamsulosin (FLOMAX) 0.4 mg capsule 2022  No Yes   Sig: Take 1 capsule by mouth once daily      Facility-Administered Medications: None       Hospital Medications:  Current Facility-Administered Medications   Medication Dose Route Frequency    milrinone (PRIMACOR) 20 MG/100 ML D5W infusion  0.25 mcg/kg/min IntraVENous CONTINUOUS    insulin glargine (LANTUS) injection 6 Units  6 Units SubCUTAneous QHS    insulin lispro (HUMALOG) injection 4 Units  4 Units SubCUTAneous TID WITH MEALS    insulin lispro (HUMALOG) injection   SubCUTAneous AC&HS    dextrose 10 % infusion 0-250 mL  0-250 mL IntraVENous PRN    spironolactone (ALDACTONE) tablet 12.5 mg  12.5 mg Oral DAILY    melatonin tablet 3 mg  3 mg Oral QHS PRN    empagliflozin (JARDIANCE) tablet 10 mg  10 mg Oral DAILY    dextrose 10 % infusion 0-250 mL  0-250 mL IntraVENous PRN    albuterol-ipratropium (DUO-NEB) 2.5 MG-0.5 MG/3 ML  3 mL Nebulization Q6H PRN    digoxin (LANOXIN) tablet 0.125 mg  0.125 mg Oral DAILY    mirtazapine (REMERON) tablet 7.5 mg  7.5 mg Oral QHS    clopidogreL (PLAVIX) tablet 75 mg  75 mg Oral DAILY    [Held by provider] potassium chloride SR (KLOR-CON 10) tablet 40 mEq  40 mEq Oral BID    pantoprazole (PROTONIX) tablet 40 mg  40 mg Oral ACB    heparin (porcine) injection 5,000 Units  5,000 Units SubCUTAneous Q12H    QUEtiapine (SEROquel) tablet 50 mg  50 mg Oral QHS    lidocaine 4 % patch 1 Patch  1 Patch TransDERmal Q24H    balsam peru-castor oiL (VENELEX) ointment   Topical BID    alteplase (CATHFLO) 1 mg in sterile water (preservative free) 1 mL injection  1 mg InterCATHeter PRN    bacitracin 500 unit/gram packet 1 Packet  1 Packet Topical PRN    glucose chewable tablet 16 g  4 Tablet Oral PRN    glucagon (GLUCAGEN) injection 1 mg  1 mg IntraMUSCular PRN    senna-docusate (PERICOLACE) 8.6-50 mg per tablet 1 Tablet  1 Tablet Oral BID PRN    bisacodyL (DULCOLAX) suppository 10 mg  10 mg Rectal QHS PRN    sodium chloride (NS) flush 5-40 mL  5-40 mL IntraVENous Q8H    sodium chloride (NS) flush 5-40 mL  5-40 mL IntraVENous PRN    acetaminophen (TYLENOL) tablet 650 mg  650 mg Oral Q6H PRN    Or    acetaminophen (TYLENOL) suppository 650 mg  650 mg Rectal Q6H PRN    polyethylene glycol (MIRALAX) packet 17 g  17 g Oral DAILY PRN    ondansetron (ZOFRAN ODT) tablet 4 mg  4 mg Oral Q8H PRN    Or    ondansetron (ZOFRAN) injection 4 mg  4 mg IntraVENous Q6H PRN    aspirin delayed-release tablet 81 mg  81 mg Oral DAILY    ipratropium (ATROVENT) 21 mcg (0.03 %) nasal spray 2 Spray  2 Spray Both Nostrils Q12H    tamsulosin (FLOMAX) capsule 0.4 mg  0.4 mg Oral DAILY    sodium chloride (NS) flush 5-40 mL  5-40 mL IntraVENous PRN       Social History:  Social History     Tobacco Use    Smoking status: Never     Passive exposure: Never    Smokeless tobacco: Never   Substance Use Topics    Alcohol use:  Yes Alcohol/week: 2.0 standard drinks     Types: 1 Cans of beer, 1 Shots of liquor per week     Comment: rarely       Family History:  Family History   Problem Relation Age of Onset    Heart Disease Father     Heart Attack Father     Hypertension Mother     Elevated Lipids Brother     Elevated Lipids Brother     No Known Problems Sister     Elevated Lipids Brother     No Known Problems Son     No Known Problems Daughter     Anesth Problems Neg Hx        Review of Systems:    Constitutional: negative fever, negative chills, negative weight loss  Eyes:   negative visual changes  ENT:   negative sore throat, tongue or lip swelling  Respiratory:  negative cough, negative dyspnea  Cards:  negative for chest pain, palpitations, lower extremity edema  GI:   See HPI  :  negative for frequency, dysuria  Integument:  negative for rash and pruritus  Heme:  negative for easy bruising and gum/nose bleeding  Musculoskel: negative for myalgias,  back pain and muscle weakness  Neuro:   negative for headaches, dizziness, vertigo  Psych:  negative for feelings of anxiety, depression      Objective:   Patient Vitals for the past 8 hrs:   BP Temp Pulse Resp SpO2 Weight   01/12/23 1200 -- -- 99 -- -- --   01/12/23 1000 -- -- (!) 108 -- -- --   01/12/23 0714 (!) 97/45 98 °F (36.7 °C) 100 16 97 % --   01/12/23 0642 -- -- -- -- -- 43 kg (94 lb 12.8 oz)   01/12/23 0547 -- -- 100 -- -- --     01/12 0701 - 01/12 1900  In: -   Out: 300 [Urine:300]  01/10 1901 - 01/12 0700  In: 964.5 [P.O.:870; I.V.:94.5]  Out: 4625 [Urine:4625]      PHYSICAL EXAM:  General appearance: cooperative, no distress, appears stated age  Skin: Extremities and face reveal no rashes. HEENT: Sclerae anicteric. Cardiovascular: Regular rate and rhythm. Respiratory: Comfortable breathing with no accessory muscle use. GI: Abdomen nondistended, soft, and nontender. Normal active bowel sounds. Rectal: Deferred   Musculoskeletal: No pitting edema of the lower legs. Neurological: Patient is alert and oriented. Psychiatric:  No anxiety or agitation. Data Review     Recent Labs     01/11/23 0051   WBC 6.6   HGB 8.5*   HCT 29.4*        Recent Labs     01/12/23  0500 01/11/23  0051   * 136   K 4.5 4.6    106   CO2 24 22   BUN 27* 27*   CREA 1.29 1.20   * 146*   PHOS 3.5 3.2   CA 9.7 9.2     Recent Labs     01/12/23  0500 01/11/23  0051   * 212*   TP 7.1 6.0*   ALB 3.3* 3.0*   GLOB 3.8 3.0     Recent Labs     01/12/23  0500 01/11/23  1814   INR  --  1.0   PTP  --  10.8   APTT 27.9  --         Imaging studies reviewed    Assessment / Plan :     Our service has been consulted to assist with endoscopic evaluation for LVAD workup. After speaking with HF NP and the patient it appears he may want to pursue work up in [address] setting. We are happy to assist with endoscopic evaluation whether IP or in OP setting. Patient will need to hold his Plavix for a 5 day washout prior to procedure. Further treatment plan pending decision by HF team and patient to purse IP vs OP work up. ATTENDING ADDENDUM OF LIZ NOTE:    I discussed care with the nurse practitioner who has made the above exam and assessment regarding Alivia Castaneda. I agree with the findings and plan as documented in the note above and have edited it to reflect my findings and plan in bold.           Patient Active Hospital Problem List:   Active Problems:    Sepsis ([facility] [address] (92/02/2350)      Systolic CHF, acute on chronic ([address] (12/29/2022)      Receiving inotropic medication (12/29/2022)     Cate Lenz NP

## 2023-01-12 NOTE — PROGRESS NOTES
Problem: Mobility Impaired (Adult and Pediatric)  Goal: *Therapy Goal (Edit Goal, Insert Text)  Description: FUNCTIONAL STATUS PRIOR TO ADMISSION: Patient was independent and active without use of DME. Patient is currently driving. Patient is independent with ADLs and IADLs. HOME SUPPORT PRIOR TO ADMISSION: The patient lived with his wife, but did not require assist.    Physical Therapy Goals  Continued 1/4/2023; remain appropriate 1/11/23  1. Patient will move from supine to sit and sit to supine, scoot up and down, and roll side to side in bed with modified independence within 7 day(s). 2.  Patient will transfer from bed to chair and chair to bed with modified independence using the least restrictive device within 7 day(s). 3.  Patient will perform sit to stand with modified independence within 7 day(s). 4.  Patient will ambulate with modified independence for 150 feet with the least restrictive device within 7 day(s). 5.  Patient will ascend/descend 13 stairs with single handrail(s) with modified independence within 7 day(s). Initiated 12/24/2022  1. Patient will move from supine to sit and sit to supine, scoot up and down, and roll side to side in bed with modified independence within 7 day(s). 2.  Patient will transfer from bed to chair and chair to bed with modified independence using the least restrictive device within 7 day(s). 3.  Patient will perform sit to stand with modified independence within 7 day(s). 4.  Patient will ambulate with modified independence for 150 feet with the least restrictive device within 7 day(s). 5.  Patient will ascend/descend 13 stairs with single handrail(s) with modified independence within 7 day(s).     Outcome: Progressing Towards Goal  PHYSICAL THERAPY TREATMENT  Patient: Lauren Reina (76 y.o. male)  Date: 1/12/2023  Diagnosis: [facility] [A41.9] <principal problem not specified>  Procedure(s) (LRB):  RIGHT HEART CATH (N/A) 3 Days Post-Op  Precautions: Fall  Chart, physical therapy assessment, plan of care and goals were reviewed. ASSESSMENT  Patient continues with skilled PT services and is progressing towards goals. Family present during session. Patient demonstrates improvements in activity tolerance and gait mechanics. Pt utilized rollator with good carryover of unlocking/locking. Pt negotiated steps using step to pattern with no LOB noted. Patient able to descend steps using 1 HR and fair steadiness. Pt did not appear significantly fatigued after stairs and ambulated back to room. Patient then performed supine<>sit multiple trials from [facility] flat and no use of rails. Will continue to practice stairs and progressing distance for ambulation. Current Level of Function Impacting Discharge (mobility/balance): supervision stairs    Other factors to consider for discharge: fall risk         PLAN :  Patient continues to benefit from skilled intervention to address the above impairments. Continue treatment per established plan of care. to address goals. Recommendation for discharge: (in order for the patient to meet his/her long term goals)  Physical therapy at least 2 days/week in the home AND ensure assist and/or supervision for safety with stairs    This discharge recommendation:  Has been made in collaboration with the attending provider and/or case management    IF patient discharges home will need the following DME: order placed to CM for rollator       SUBJECTIVE:   Patient stated I am good.     OBJECTIVE DATA SUMMARY:   Critical Behavior:  Neurologic State: Alert  Orientation Level: Oriented X4  Cognition: Appropriate decision making, Appropriate safety awareness, Follows commands  Safety/Judgement: Awareness of environment, Fall prevention, Insight into deficits  Functional Mobility Training:  Bed Mobility:     Supine to Sit: Supervision  Sit to Supine: Supervision           Transfers:  Sit to Stand: Supervision  Stand to Sit: Supervision    Balance:  Sitting: Intact  Standing: Impaired  Standing - Static: Good  Standing - Dynamic : Fair;Constant support  Ambulation/Gait Training:  Distance (ft): 100 Feet (ft)  Assistive Device: Gait belt;Walker, rollator  Ambulation - Level of Assistance: Supervision        Gait Abnormalities: Decreased step clearance;Trunk sway increased    Speed/Bette: Slow  Step Length: Right shortened;Left shortened       Stairs:  Number of Stairs Trained: 5 (x2)  Stairs - Level of Assistance: Supervision   Rail Use: Both (Left when descending)    Activity Tolerance:   Fair    After treatment patient left in no apparent distress:   Sitting in chair, Call bell within reach, and Bed / chair alarm activated    COMMUNICATION/COLLABORATION:   The patients plan of care was discussed with: Registered nurse and Rehabilitation technician.      Cindy Sofia, PT   Time Calculation: 13 mins

## 2023-01-12 NOTE — PROGRESS NOTES
1930 Bedside shift change report given to 1200 Fco Mirza (oncoming nurse) by Dino Zamora (offgoing nurse). Report included the following information SBAR, Kardex, Intake/Output, MAR, Recent Results, and Cardiac Rhythm NSR . Problem: Patient Education: Go to Patient Education Activity  Goal: Patient/Family Education  Outcome: Progressing Towards Goal     Problem: Falls - Risk of  Goal: *Absence of Falls  Description: Document Chad Elizalde Fall Risk and appropriate interventions in the flowsheet.   Outcome: Progressing Towards Goal  Note: Fall Risk Interventions:  Mobility Interventions: Assess mobility with egress test, Communicate number of staff needed for ambulation/transfer, Patient to call before getting OOB         Medication Interventions: Assess postural VS orthostatic hypotension, Evaluate medications/consider consulting pharmacy, Patient to call before getting OOB    Elimination Interventions: Call light in reach, Patient to call for help with toileting needs    History of Falls Interventions: Door open when patient unattended, Evaluate medications/consider consulting pharmacy         Problem: Patient Education: Go to Patient Education Activity  Goal: Patient/Family Education  Outcome: Progressing Towards Goal     Problem: Patient Education: Go to Patient Education Activity  Goal: Patient/Family Education  Outcome: Progressing Towards Goal     Problem: Heart Failure: Discharge Outcomes  Goal: *Demonstrates ability to perform prescribed activity without shortness of breath or discomfort  Outcome: Progressing Towards Goal  Goal: *Left ventricular function assessment completed prior to or during stay, or planned for post-discharge  Outcome: Progressing Towards Goal  Goal: *ACEI prescribed if LVEF less than 40% and no contraindications or ARB prescribed  Outcome: Progressing Towards Goal  Goal: *Verbalizes understanding and describes prescribed diet  Outcome: Progressing Towards Goal  Goal: *Verbalizes understanding/describes prescribed medications  Outcome: Progressing Towards Goal  Goal: *Describes available resources and support systems  Description: (eg: Home Health, Palliative Care, Advanced Medical Directive)  Outcome: Progressing Towards Goal  Goal: *Describes smoking cessation resources  Outcome: Progressing Towards Goal  Goal: *Understands and describes signs and symptoms to report to providers(Stroke Metric)  Outcome: Progressing Towards Goal  Goal: *Describes/verbalizes understanding of follow-up/return appt  Description: (eg: to physicians, diabetes treatment coordinator, and other resources  Outcome: Progressing Towards Goal  Goal: *Describes importance of continuing daily weights and changes to report to physician  Outcome: Progressing Towards Goal     Problem: Discharge Planning  Goal: *Discharge to safe environment  Outcome: Progressing Towards Goal  Goal: *Knowledge of medication management  Outcome: Progressing Towards Goal  Goal: *Knowledge of discharge instructions  Outcome: Progressing Towards Goal     Problem: Patient Education: Go to Patient Education Activity  Goal: Patient/Family Education  Outcome: Progressing Towards Goal     Problem: Pressure Injury - Risk of  Goal: *Prevention of pressure injury  Description: Document Mike Scale and appropriate interventions in the flowsheet.   Outcome: Progressing Towards Goal  Note: Pressure Injury Interventions:  Sensory Interventions: Assess changes in LOC, Assess need for specialty bed, Avoid rigorous massage over bony prominences, Check visual cues for pain, Discuss PT/OT consult with provider, Float heels, Keep linens dry and wrinkle-free, Maintain/enhance activity level, Minimize linen layers, Pressure redistribution bed/mattress (bed type)    Moisture Interventions: Absorbent underpads, Apply protective barrier, creams and emollients, Check for incontinence Q2 hours and as needed, Assess need for specialty bed, Internal/External urinary devices, Limit adult briefs, Minimize layers    Activity Interventions: Assess need for specialty bed, Increase time out of bed, Pressure redistribution bed/mattress(bed type), PT/OT evaluation    Mobility Interventions: Assess need for specialty bed, Float heels, Pressure redistribution bed/mattress (bed type), PT/OT evaluation    Nutrition Interventions: Document food/fluid/supplement intake    Friction and Shear Interventions: Apply protective barrier, creams and emollients, Feet elevated on foot rest, Foam dressings/transparent film/skin sealants, Lift sheet, Lift team/patient mobility team, Minimize layers                Problem: Patient Education: Go to Patient Education Activity  Goal: Patient/Family Education  Outcome: Progressing Towards Goal

## 2023-01-12 NOTE — PROGRESS NOTES
[facility] patient for LVAD education however PICC team was getting ready to insert line. Will see patient another day.      Aamir Jones, RN   LVAD coordinator

## 2023-01-12 NOTE — PROGRESS NOTES
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, LVEF 25-30%, stage C, NYHA class IIIA  Unable to up-titrate GDMT for HF due to hypotension and orthostasis likely due to overdiuresis; now weaning dobutamine gtt as BP, CrCl, pro-NT-BNP and lactic improves with holding diuretics; still dry by RHC today with index 1.8  Most likely etiology of cardiomyopathy: CAD +/- TRISTAN +/- inappropriate sinus tach +/- undergoing workup (cardiac MRI with ischemic CM, PYP equivocal, gammopathy and genetics pending)  Patient and family are reviewing LVAD information to make a decision on pursuing VAD evaluation       RECOMMENDATIONS:  Increase Milrinine to 0.25mcg/kg/min   Place PICC line  Orthostatics daily  Consider Bbrx when orthostasis resolved   Once orthosthasis resolves then will start ARB/ARNi  Continue Spironolactone 12.5mg daily  Continue jardiance 10mg daily  MOCA from 22, consistent with mild cognitive impairment  Inpatient sleep medicine consult pending (high risk for TRISTAN)  Continue digoxin 0.125mcg daily, check digoxin level daily (today 1.0 goal 0.7-1.2)  Continue baby aspirin and plavix  DNR- will need to re address DNR status   Physical therapy/ambulate daily  Plan on DC with home inotropic support as bridge to LVAD  VAD evaluate in progress, will need to do dental, urology, sleep medicine and GI evaluation as OP.       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, LVEF 25-30% and 23% (by cMRI 1/3/23)  Undergoing evaluation for cardiac amyloidosis with cMRI  Coronary artery disease  CAD s/p CABG x 2: further disease best managed medically due to small vessel size   Peripheral arterial disease  Bilateral hydronephrosis s/p andrzej  Cardiac risk factors:  HTN  HL  TRISTAN, STOP-BANG 4  DM2  CKD, stage 3      INTERVAL EVENTS:  Started on low dose milrinone  VAD evaluation initiated   NAEO  Ambulating well  Improved appetite   Afebrile  -130s/50s, HR 80-90s  I/O net neg 1.6L  Cr 1.29  K 4.5  Alb 3.3  T Bili stable          LIFE GOALS:  Lifestyle goals reviewed with the patient. Patient's personal goals include: TBD  Important upcoming milestones or family events: TBD  The patient identifies the following as important for living well: TBD     Patient assessed. High suspicion of sleep apnea due to the following reasons. Will refer for sleep evaluation. [x] Heart Failure  [] Hypertention  [x] Atrial Fibrillation  [] BMI > 30  [] History of stroke  [] Diabetes  [x] Heavy snoring  [] Witnessed apnea  [] Hypoxemia                    HPI:  71 y.o. male who was admitted via ED for worsening SOB, poor appetite, orthopnea and fatigue. Pmhx of CAD s/p CABG, PAD, DM 2, recent admission for HFmrEF earlier this month. Serial TTEs show progressive decline in EF since 3/2021 with the most recent EF at 25-30%. Recent LHC shows diffuse CAD and no interventions indicated. Patient had Gonzalo Berto recently and there is some thought to myocarditis or other etiology causing worsening HF. [facility] was consulted for further evaluation and management of HFrEF. CARDIAC IMAGING:  Echo 1/9/23   Left Ventricle: Severely reduced left ventricular systolic function with a visually estimated EF of 25 - 30%. Left ventricle size is normal. Mildly increased wall thickness. Echo 12/26/22    Left Ventricle: Severely reduced left ventricular systolic function with a visually estimated EF of 25 - 30%.  Left ventricle size is normal. Normal wall thickness. There are regional wall motion abnormalities. Grade II diastolic dysfunction with increased LAP. Right Ventricle: Moderately reduced systolic function. TAPSE is abnormal. TAPSE is 1.1 cm. Aortic Valve: Mild stenosis of the aortic valve. AV peak gradient is 13 mmHg. AV peak velocity is 1.8 m/s. Mitral Valve: Not well visualized. Moderate annular calcification at the posterior leaflet of the mitral valve. Mild to moderate regurgitation. Tricuspid Valve: Mildly elevated RVSP. Left Atrium: Left atrium is moderately dilated. 12/8/22    Left Ventricle: Moderately reduced left ventricular systolic function with a visually estimated EF of 35 - 40%. Severe hypokinesis of the following segments: mid anteroseptal, apical anterior, apical septal, apical inferior and apical lateral. Severe hypokinesis of the apex. Mitral Valve: Severely thickened leaflet, at the anterior and posterior leaflets. Severely calcified leaflet, at the anterior and posterior leaflets. Mild annular calcification of the mitral valve. Moderate regurgitation. Left Atrium: Left atrium is mildly dilated. Contrast used: Definity. limited study     EKG 12/22/22 ST, Biatria enlargement, marked ST abnormality     [facility] 12/6/22  1. Normal LVEDP  2. Severe native multivessel coronary artery disease  3. Patent LIMA to LAD and vein graft to distal RCA  4. Recurrent ISR in OM1 stent with now 60 to 70% restenosis  5. Recoil of left main and circumflex stent with now recurrent 40 to 50% stenosis. 6.  Progression of ostial left main disease now to about 60% stenosis  7. Progression of disease in jailed first marginal branch now with diffuse 90% stenosis  8.   High-grade stenosis in the mid to distal right potential femoral artery treated with 6 x 40 mm impact drug-coated balloon angioplasty to reduce the stenosis to less than 40%     NST        HEMODYNAMICS:  [facility] 1/9/23  PA 20/9, RA 3, PCWP 8, CI 1.8    CPEST too ill   6MW 300 feet       PHYSICAL EXAM:  Visit Vitals  BP (!) 97/45   Pulse (!) 108   Temp 98 °F (36.7 °C)   Resp 16   Ht 5' 9\" (1.753 m)   Wt 94 lb 12.8 oz (43 kg)   SpO2 97%   BMI 14.00 kg/m²     Physical Exam  Vitals and nursing note reviewed. Constitutional:       Appearance: Normal appearance. He is underweight. Cardiovascular:      Rate and Rhythm: Normal rate and regular rhythm. Pulses: Normal pulses. Heart sounds: Normal heart sounds. Pulmonary:      Effort: No respiratory distress. Breath sounds: Normal breath sounds. Abdominal:      General: There is no distension. Palpations: Abdomen is soft. Musculoskeletal:         General: No swelling. Skin:     General: Skin is warm and dry. Neurological:      General: No focal deficit present. Mental Status: He is alert and oriented to person, place, and time. Psychiatric:         Mood and Affect: Mood normal.        REVIEW OF SYSTEMS:  Review of Systems   Constitutional:  Negative for chills, fever and malaise/fatigue. Respiratory:  Negative for cough and shortness of breath. Cardiovascular:  Negative for chest pain, palpitations and leg swelling. Gastrointestinal:  Negative for constipation, heartburn, nausea and vomiting. Musculoskeletal:  Negative for falls and myalgias. Neurological:  Negative for dizziness and headaches. Psychiatric/Behavioral:  Negative for depression.         PAST MEDICAL HISTORY:  Past Medical History:   Diagnosis Date    CAD (coronary artery disease) 11/10/2016    NSTEMI & 2 stents    Deafness 10/28/2012    DM (diabetes mellitus) ([facility] [address]     Elevated cholesterol     Hypertension     NSTEMI (non-ST elevated myocardial infarction) ([facility] [address] 11/10/2016       PAST SURGICAL HISTORY:  Past Surgical History:   Procedure Laterality Date    COLONOSCOPY N/A 6/28/2018    COLONOSCOPY performed by Jose Cifuentes MD at [facility] [address]  11/11/2016    2 stents       FAMILY HISTORY:  Family History   Problem Relation Age of Onset    Heart Disease Father     Heart Attack Father     Hypertension Mother     Elevated Lipids Brother     Elevated Lipids Brother     No Known Problems Sister     Elevated Lipids Brother     No Known Problems Son     No Known Problems Daughter     Anesth Problems Neg Hx        SOCIAL HISTORY:  Social History     Socioeconomic History    Marital status:    Tobacco Use    Smoking status: Never     Passive exposure: Never    Smokeless tobacco: Never   Vaping Use    Vaping Use: Never used   Substance and Sexual Activity    Alcohol use: Yes     Alcohol/week: 2.0 standard drinks     Types: 1 Cans of beer, 1 Shots of liquor per week     Comment: rarely    Drug use: No    Sexual activity: Yes     Social Determinants of Health     Financial Resource Strain: Medium Risk    Difficulty of Paying Living Expenses: Somewhat hard   Food Insecurity: Food Insecurity Present    Worried About Running Out of Food in the Last Year: Never true    Ran Out of Food in the Last Year: Often true       LABORATORY RESULTS:     Labs Latest Ref Rng & Units 1/12/2023 1/11/2023 1/10/2023 1/9/2023 1/8/2023 1/7/2023 1/6/2023   WBC 4.1 - 11.1 K/uL - 6.6 - 9.0 - 7.7 7.9   RBC 4.10 - 5.70 M/uL - 3.65(L) - 3.66(L) - 4.42 3.90(L)   Hemoglobin 12.1 - 17.0 g/dL - 8. 5(L) - 8. 7(L) - 10. 1(L) 9.0(L)   Hematocrit 36.6 - 50.3 % - 29. 4(L) - 29. 2(L) - 35. 7(L) 31. 3(L)   MCV 80.0 - 99.0 FL - 80.5 - 79. 8(L) - 80.8 80.3   Platelets 117 - 041 K/uL - 276 - 254 - 273 247   Lymphocytes 12 - 49 % - - - - - 15 11(L)   Monocytes 5 - 13 % - - - - - 6 8   Eosinophils 0 - 7 % - - - - - 5 3   Basophils 0 - 1 % - - - - - 1 1   Albumin 3.5 - 5.0 g/dL 3. 3(L) 3.0(L) 2. 9(L) 2. 9(L) 3.0(L) 3. 1(L) 3.0(L)   Calcium 8.5 - 10.1 MG/DL 9.7 9.2 9.3 9.4 9.1 9.1 9.4   Glucose 65 - 100 mg/dL 118(H) 146(H) 77 192(H) 135(H) 110(H) 177(H)   BUN 6 - 20 MG/DL 27(H) 27(H) 30(H) 36(H) 36(H) 40(H) 56(H)   Creatinine 0.70 - 1.30 MG/DL 1.29 1.20 1.14 1.27 1.26 1.07 1.21   Sodium 136 - 145 mmol/L 135(L) 136 136 138 138 137 133(L)   Potassium 3.5 - 5.1 mmol/L 4.5 4.6 4.3 4.6 4.8 4.4 4.3   TSH 0.36 - 3.74 uIU/mL - - - - - - -   PSA 0.01 - 4.0 ng/mL - - - - - - -   LDH 85 - 241 U/L 189 - - - - - -   Some recent data might be hidden     Lab Results   Component Value Date/Time    TSH 2.12 12/27/2022 02:36 PM    TSH 4.80 (H) 12/06/2022 03:53 AM    TSH 5.39 (H) 10/12/2022 09:10 AM    TSH 3.53 02/03/2022 11:47 AM    TSH 5.790 (H) 11/21/2019 04:45 PM    TSH 3.08 06/22/2018 01:53 PM    TSH 4.250 05/26/2015 09:43 AM       ALLERGY:  No Known Allergies     CURRENT MEDICATIONS:    Current Facility-Administered Medications:     milrinone (PRIMACOR) 20 MG/100 ML D5W infusion, 0.25 mcg/kg/min, IntraVENous, CONTINUOUS, Shaila, Lizette B, NP    insulin glargine (LANTUS) injection 6 Units, 6 Units, SubCUTAneous, QHS, Cathy Sheldon, CNS, 6 Units at 01/11/23 2146    insulin lispro (HUMALOG) injection 4 Units, 4 Units, SubCUTAneous, TID WITH MEALS, Cathy Sheldon, CNS, 4 Units at 01/11/23 1733    insulin lispro (HUMALOG) injection, , SubCUTAneous, AC&HS, Cathy Sheldon, CNS, 1 Units at 01/11/23 2147    dextrose 10 % infusion 0-250 mL, 0-250 mL, IntraVENous, PRN, Cathy Sheldon, CNS    spironolactone (ALDACTONE) tablet 12.5 mg, 12.5 mg, Oral, DAILY, Shaila Lizette B, NP, 12.5 mg at 01/12/23 0933    melatonin tablet 3 mg, 3 mg, Oral, QHS PRN, Modesta Casper NP, 3 mg at 01/10/23 2144    empagliflozin (JARDIANCE) tablet 10 mg, 10 mg, Oral, DAILY, Juan Luis Falk MD, 10 mg at 01/12/23 0933    dextrose 10 % infusion 0-250 mL, 0-250 mL, IntraVENous, PRN, Cathy Sheldon CNS    albuterol-ipratropium (DUO-NEB) 2.5 MG-0.5 MG/3 ML, 3 mL, Nebulization, Q6H PRN, Link Ferreira MD    digoxin (LANOXIN) tablet 0.125 mg, 0.125 mg, Oral, DAILY, Colleen Astorga NP, 0.125 mg at 01/12/23 0933    mirtazapine (REMERON) tablet 7.5 mg, 7.5 mg, Oral, QHS, Alex HARTLEY MD, 7.5 mg at 01/11/23 2147    clopidogreL (PLAVIX) tablet 75 mg, 75 mg, Oral, DAILY, Diefenderfer, Shara Brittle, NP, 75 mg at 01/12/23 0933    [Held by provider] potassium chloride SR (KLOR-CON 10) tablet 40 mEq, 40 mEq, Oral, BID, Agnes MOLINA MD, 40 mEq at 01/03/23 0838    pantoprazole (PROTONIX) tablet 40 mg, 40 mg, Oral, ACB, Santhosh Xavieroty, DO, 40 mg at 01/12/23 0646    heparin (porcine) injection 5,000 Units, 5,000 Units, SubCUTAneous, Q12H, Santhosh Dillon DO, 5,000 Units at 01/12/23 0933    QUEtiapine (SEROquel) tablet 50 mg, 50 mg, Oral, QHS, AguilarJustice jeronimoin G, DO, 50 mg at 01/11/23 2147    lidocaine 4 % patch 1 Patch, 1 Patch, TransDERmal, Q24H, Aminta Ward MD, 1 Patch at 01/11/23 1734    balsam peru-castor oiL (VENELEX) ointment, , Topical, BID, Adelfo Major MD, Given at 01/12/23 0933    alteplase (CATHFLO) 1 mg in sterile water (preservative free) 1 mL injection, 1 mg, InterCATHeter, PRN, Aminta Ward MD    bacitracin 500 unit/gram packet 1 Packet, 1 Packet, Topical, PRN, Agnes MOLINA MD    glucose chewable tablet 16 g, 4 Tablet, Oral, PRN, Agnes MOLINA MD    glucagon (GLUCAGEN) injection 1 mg, 1 mg, IntraMUSCular, PRN, Aminta Ward MD    senna-docusate (PERICOLACE) 8.6-50 mg per tablet 1 Tablet, 1 Tablet, Oral, BID PRN, Agnes MOLINA MD, 1 Tablet at 12/26/22 5219    bisacodyL (DULCOLAX) suppository 10 mg, 10 mg, Rectal, QHS PRN, Lindsay Daily, Walker MOLINA MD    sodium chloride (NS) flush 5-40 mL, 5-40 mL, IntraVENous, Q8H, Walker Aponte MD, 10 mL at 01/12/23 0646    sodium chloride (NS) flush 5-40 mL, 5-40 mL, IntraVENous, PRN, Agnes MOLINA MD, 10 mL at 12/28/22 0845    acetaminophen (TYLENOL) tablet 650 mg, 650 mg, Oral, Q6H PRN, 650 mg at 01/05/23 4964 **OR** acetaminophen (TYLENOL) suppository 650 mg, 650 mg, Rectal, Q6H PRN, Aminta Ward MD    polyethylene glycol (MIRALAX) packet 17 g, 17 g, Oral, DAILY PRN, Severa Grime, MD, 17 g at 12/26/22 0649    ondansetron (ZOFRAN ODT) tablet 4 mg, 4 mg, Oral, Q8H PRN **OR** ondansetron (ZOFRAN) injection 4 mg, 4 mg, IntraVENous, Q6H PRN, Piyush MOLINA MD, 4 mg at 12/24/22 0849    aspirin delayed-release tablet 81 mg, 81 mg, Oral, DAILY, Ranjit Pearson MD, 81 mg at 01/12/23 0933    ipratropium (ATROVENT) 21 mcg (0.03 %) nasal spray 2 Spray, 2 Spray, Both Nostrils, Q12H, Ranjit Pearson MD, 2 [place] at 01/12/23 0934    tamsulosin (FLOMAX) capsule 0.4 mg, 0.4 mg, Oral, DAILY, Geoffrey Pearson MD, 0.4 mg at 01/12/23 0933    sodium chloride (NS) flush 5-40 mL, 5-40 mL, IntraVENous, PRN, Severa Grime, MD    PATIENT CARE TEAM:  Patient Care Team:  Berny Cedeno MD as PCP - General (Family Medicine)  Berny Cedeno MD as PCP [facility]  Jan Mckeon MD (Cardiovascular Disease Physician)  Porsha Lebron MD (Gastroenterology)  Jewel Woody MD (Cardiothoracic Surgery)  Nathen Thomas MD (Cardiovascular Disease Physician)  Josi Berry MD (Nephrology)  Kike Rodriguez RN as Care Transitions Nurse  Jesse Kohler MD (Pulmonary Disease)     Thank you for allowing me to participate in this patient's care.     Kathy Lewis NP   [address] [place] [address], [address]  Phone: [phone]

## 2023-01-12 NOTE — PROGRESS NOTES
0730: Bedside shift change report given to Stephanie Pardo (oncoming nurse) by Karin Gatica (offgoing nurse). Report included the following information SBAR, Kardex, MAR, and Recent Results. Mil gtt rate verified. 1400: Noted hematuria with small clots in tubing. Urine pink tinged in the bag. Notified MD Calin Mcnair and NP Emilee Coulter, orders received for urology consult. 1600: R DL picc placed, confirmed with vascular team it is okay to use. 1720: Updated family on picc place placement and plan of care. 1930: Bedside shift change report given to Rodrigo Cook (oncoming nurse) by Stephanie Pardo (offgoing nurse). Report included the following information SBAR, Kardex, MAR, and Recent Results. Mil gtt dual verified.       Care plan:    Problem: Heart Failure: Discharge Outcomes  Goal: *Demonstrates ability to perform prescribed activity without shortness of breath or discomfort  Outcome: Progressing Towards Goal  Goal: *Verbalizes understanding and describes prescribed diet  Outcome: Progressing Towards Goal  Goal: *Verbalizes understanding/describes prescribed medications  Outcome: Progressing Towards Goal  Goal: *Describes available resources and support systems  Description: (eg: Home Health, Palliative Care, Advanced Medical Directive)  Outcome: Progressing Towards Goal  Goal: *Understands and describes signs and symptoms to report to providers(Stroke Metric)  Outcome: Progressing Towards Goal  Goal: *Describes/verbalizes understanding of follow-up/return appt  Description: (eg: to physicians, diabetes treatment coordinator, and other resources  Outcome: Progressing Towards Goal  Goal: *Describes importance of continuing daily weights and changes to report to physician  Outcome: Progressing Towards Goal     Problem: Patient Education: Go to Patient Education Activity  Goal: Patient/Family Education  Outcome: Progressing Towards Goal     Problem: Patient Education: Go to Patient Education Activity  Goal: Patient/Family Education  Outcome: Progressing Towards Goal     Problem: Pressure Injury - Risk of  Goal: *Prevention of pressure injury  Description: Document Mike Scale and appropriate interventions in the flowsheet.   Outcome: Progressing Towards Goal  Note: Pressure Injury Interventions:  Sensory Interventions: Assess changes in LOC, Assess need for specialty bed    Moisture Interventions: Absorbent underpads, Apply protective barrier, creams and emollients    Activity Interventions: Increase time out of bed, Pressure redistribution bed/mattress(bed type)    Mobility Interventions: HOB 30 degrees or less    Nutrition Interventions: Discuss nutritional consult with provider, Document food/fluid/supplement intake    Friction and Shear Interventions: Apply protective barrier, creams and emollients, Feet elevated on foot rest, Foam dressings/transparent film/skin sealants, Lift sheet, Lift team/patient mobility team, Minimize layers    Problem: Infection - Risk of, Urinary Catheter-Associated Urinary Tract Infection  Goal: *Absence of infection signs and symptoms  Outcome: Progressing Towards Goal     Problem: Nutrition Deficit  Goal: *Optimize nutritional status  Outcome: Progressing Towards Goal     Problem: Pain  Goal: *Control of Pain  Outcome: Progressing Towards Goal

## 2023-01-12 NOTE — PROGRESS NOTES
Transitions of Care Plan  RUR: 24% - high  Clinical Update: milrinone started; LVAD workup; needs plavix washout - anticipate d/c tomorrow and return outpatient  Consults: [facility]; GI  Baseline: independent without DME; resides w wife  Barriers to Discharge: medical  Disposition:   Home Health - will send order to [facility] for review; Twyla Dyer unable to manage home inotrope  Home Infusion - [facility]  Estimated Discharge Date: 1/13/23    CM spoke with patient and family at bedside. Advised rollator ordered through [employer] for DME at home and will be delivered at bedside. CM provided explanation that patient currently does not functionally qualify for stair lift and hospital bed. CM to provide DME supply companies for family to research out of pocket costs. Additional resource for home care aide service will be provided. Patient does not qualify for [facility] completion due to independence with use of rollator. CM will continue to follow.     Jason Salguero, MPH  Care Manager [facility]  Available via [facility] or

## 2023-01-12 NOTE — PROGRESS NOTES
Met with pt. At bedside. Pt. Has hearing aide in left ear. Sat on left side. Pt. Reported some difficulty hearing some questions. States he had a hearing aide on his right side, but has not been able to locate it. Completed several questions for LVAD SW assessment. Pt. Requested SW contat his wife to obtain answers to family history questions. SW will indicate response from pt. Or wife in each section. [facility]  Social Work LVAD/Heart Transplant/ Heart Failure Evaluation      Date: 2023                                                                       NAME: Liliana Kendall   : 1950   ADDRESS: [place] [address]   PHONE NUMBERS:  see chart. Roman Catholic:Latter day, no practices that would impact medical care. COUNTRY OF BIRTH: [facility]  CITIZENSHIP: [place] no other citizenship   MARITAL STATUS:         STATUS:none      Family Information:   Pt. Wife Responded to these questions. Where were you born? : [place]  Who raised you? Parents and uncle   Where were you raised? Lived in [place], [place], [place], [facility]  Does your family have a HX of heart problems? Yes, pt. Father  in his 45s due to heart failure. Are your parents living or ? Mother living, father      Do you have any siblings? Yes. 3 brothers one sister  Do you have a good relationship with your siblings? No  Will they be able to offer support before, during, and after LVAD/Transplant surgery? No. Siblings live in multiple countries and states, but have been present for visits. Do you live with anyone? Yes  If so, are the people you live with in good health? No, has neck fusion, back surgeries                  Are you involved in a significant relationship? (If not )     Do you have any children? Yes, daughter and son  Do you have a good relationship with your children?  Yes   Will they be able to offer support before, during, and after LVAD/Transplant surgery? Pt. Wife states pt. Daughter may not be able to help as she lives in [place], [place]. Son lives in [place]. Do you have any pets? Yes,dog  Are you currently a caregiver? He assists as needed with household chores. Educational History:     What is the highest level of education you have obtained? 12th grade, no GED/Diploma  Do you have any problems with reading or writing? no  How do you obtain information best? combination    Pharmacy / Medication History:   Obtained from pt. Wife. Where do you fill your medications? [facility] and phone number [phone]. Address is [facility]. Are you compliant with your medications? yes    Do you have any difficulties in obtaining or taking your medications? . Wife picks up medications. She reports he sometimes needs breaks to be able to swallowing multiple pills due to nausea  Dentist name and number? Pt. Wife will obtain  PCP name and number? Dr. Rodney Appiah. [phone]    Substance Abuse History:   Pt. Responded to these questions. Do you smoke? No, not within the last 6 months  Does anyone else in your household smoke? no  Do you drink? Up to 4 drinks per week, no more than 2 at one time. Pt. Wife added that pt. Has decreased to this amount in last 6 months. Prior to 6 months ago, he consumed alcohol 3 to 4 times per week 2 drinks at a time. Does anyone else in your household drink? no  Do you use illegal drugs? No  Does anyone else in your household use illegal drugs? no  In the past year have you been educated about reducing or stopping substances such as alcohol, illegal drugs, or prescription drugs?:   Do you use any substances including nicotine to help manage stress in the  last year? Have you even been involved in a drug or alcohol treatment program? no    Psychiatric History:     Pt. And wife answered these questions.    Have you ever been under the care of a mental health professional? no  Have you ever been hospitalized for psychiatric reasons? no  Current/Past suicidal ideations?: No  Current/Past homicidal ideations?: No  Are you currently on any medications for mental health issues? Took meds for depression for a few months in 2005  Is there history of mental illness in your family? no  Have you ever left the hospital AMA? no  Do you have a mental health history or current DSM-5 diagnosis? Past hx of depression. No recent history or other MH diagnosis. PHQ2 = 0       Mental Status Exam:   1. Presenting problem has affected:  No significant effect    2. Client manner of dress:   Appropriate    3. Patient Hygiene:  Excellent    4. Level of Responsiveness: Alert and oriented to all spheres  5. Client motor behavior: Normal    6. Evaluation of client level of distress:  Pt. Reported difficulty hearing. He requested SW obtain some information from his wife. Contacted pt. Wife after bedside assessment. 7.  Signs of client distress during interview:  None    8. Affect: WNR    9. Thought Process:  Logical and Coherent    10. Thought Content / Preoccupations: No evidence of impairment    11. Unusual Perceptual Experiences:  none    12. Attention / concentration:  intact    13. Orientation for:  Oriented in all spheres    14. Memory Functions:  no evidence of impairment during interview     15. Insight (as age appropriate):  good    12.  Judgement (as age appropriate):    good  Legal Concerns:   Pt. Responded to these questions. Are you currently or have you ever been on parole, probation, or involved in litigation? no  Have you had any substance related legal problems? no  Have you ever been incarcerated? no  Do you have a valid s license? yes    Lifestyle/Functional Ability / Personal Care:   Pt. Responded to these questions:  What is your diagnosis and when were you diagnosed?  Heart failure, stated \"my heart is not working properly\"  Has your illness affected your life? yes  What are your daily activities? Helping around the house, occasionally going out, watching TV or listening to [address]. How do you handle stressful situations? Listen to music , pt. Does not always like to discuss feelings with others What are your coping mechanisms? Listen to music  Wife was asked these questions:  What is your living situation? single family home  Do you use any DME? no none  Are you open to home health or personal care? yes  Transportation- Do you drive? yes   Household/personal tasks- Are you independent in cooking, cleaning/laundry, yardwork, shopping, dressing, and bathing? yes  [address] [place] [address] name and account number? [employer]    Support System:     Who will be your primary support person before, during, and after your LVAD/Transplant surgery? spouse  Will anyone else be providing assistance and support? no  Who will bring you to clinic appointments before and after LVAD implementation? spouse  Do they have a reliable automobile? yes  Do they have a valid s license? yes  Are you active in community agencies, Pentecostal, Tenri[employer], or any other lisa based community? no  Who do you usually call for help with an emergency? Wife  Who in your support system would you call if you needed a ride to the store? Wife or son  Who would you contact if you wanted help understanding something challenging? Wife or son  What do you find most supportive/helpful when dealing with stress? Music  Who do you normally count on for emotional support or someone to talk to? Wife or other family  Have you read material about the LVAD/Heart transplant surgery? yes  Are you interested in meeting someone with a LVAD/Heart transplant? no  How does your spouse, significant other, family feel about LVAD/Transplant? Has concerns about dressing change, risk of infection, learning the LVAD, what twenty four hour care will look like.  She reports she has back injury issues, but can drive and prepare meals and does not work. She is able to come to [facility] [place] for training. Concerns and Questions:     Do you have any fears or concerns regarding LVAD/Transplant surgery? No  How do you think you will prepare yourself for the LVAD/Transplant surgery? Read and reports his son will help by reading and will likely explain anything he does not understand. Do you believe that you understand what challenges and changes you will experience with LVAD/Transplant surgery? yes  Hopes his quality of life will improve after he recovers from surgery  Do you have a living will or an advance directive? Yes, on file and pt. Is now a DNR and does not want to be intubated. Impressions/Barriers:       Psychosocial Recommendations:  Pt. Has Medicare and Medicaid, no current financial concerns, no current MH concerns, and no history of S/I. Pt. MoCA was 17/30, but pt. Was missing one hearing aide which may be a mitigating factor. Pt. Currently uses alcohol twice weekly reduced from 4x/wk. SW would suggest 3/mon screening-PETH if possible. No recent tobacco use. Pt. Has limited social support in the area, but pt. Wife resides with him and will e present 24/7 post surgery. She reports existing mobility and back issues. SW would recommend education about the specifics of being a caregiver for an LVAD patient. Potentially allow pt. Wife to speak to existing caregiver. Provide application/resources for reduced cost hearing aide and any other basic resources in addition to existing state benefits. Wife also reports 15 steps to bedroom in home and has concerns abt. Pt. Safety post-VAD in the living space. SW suggested discussing with OT/PT. Biju Camacho LMSW, LCSW Supervisee.    [address] [place] [address], [address]  Phone: [phone]  Fax: [phone]

## 2023-01-13 ENCOUNTER — HOME HEALTH ADMISSION (OUTPATIENT)
Dept: HOME HEALTH SERVICES | Facility: HOME HEALTH | Age: 73
End: 2023-01-13
Payer: MEDICARE

## 2023-01-13 LAB
ALBUMIN SERPL-MCNC: 3 G/DL (ref 3.5–5)
ALBUMIN/GLOB SERPL: 1 (ref 1.1–2.2)
ALP SERPL-CCNC: 181 U/L (ref 45–117)
ALT SERPL-CCNC: 55 U/L (ref 12–78)
ANION GAP SERPL CALC-SCNC: 6 MMOL/L (ref 5–15)
AST SERPL-CCNC: 31 U/L (ref 15–37)
ATRIAL RATE: 102 BPM
BILIRUB SERPL-MCNC: 0.4 MG/DL (ref 0.2–1)
BNP SERPL-MCNC: 3797 PG/ML
BUN SERPL-MCNC: 29 MG/DL (ref 6–20)
BUN/CREAT SERPL: 23 (ref 12–20)
CALCIUM SERPL-MCNC: 9.4 MG/DL (ref 8.5–10.1)
CALCULATED P AXIS, ECG09: 64 DEGREES
CALCULATED R AXIS, ECG10: 50 DEGREES
CALCULATED T AXIS, ECG11: 156 DEGREES
CHLORIDE SERPL-SCNC: 106 MMOL/L (ref 97–108)
CO2 SERPL-SCNC: 23 MMOL/L (ref 21–32)
CREAT SERPL-MCNC: 1.28 MG/DL (ref 0.7–1.3)
DIAGNOSIS, 93000: NORMAL
DIGOXIN SERPL-MCNC: 1.3 NG/ML (ref 0.9–2)
ERYTHROCYTE [DISTWIDTH] IN BLOOD BY AUTOMATED COUNT: 23.5 % (ref 11.5–14.5)
G6PD BLD QN: 326 U/10E12 RBC (ref 127–427)
GLOBULIN SER CALC-MCNC: 3 G/DL (ref 2–4)
GLUCOSE BLD STRIP.AUTO-MCNC: 128 MG/DL (ref 65–117)
GLUCOSE BLD STRIP.AUTO-MCNC: 159 MG/DL (ref 65–117)
GLUCOSE BLD STRIP.AUTO-MCNC: 222 MG/DL (ref 65–117)
GLUCOSE BLD STRIP.AUTO-MCNC: 266 MG/DL (ref 65–117)
GLUCOSE SERPL-MCNC: 106 MG/DL (ref 65–100)
H PYLORI IGA SER-ACNC: <9 UNITS (ref 0–8.9)
H PYLORI IGG SER IA-ACNC: 0.1 INDEX VALUE (ref 0–0.79)
H PYLORI IGM SER-ACNC: <9 UNITS (ref 0–8.9)
HCT VFR BLD AUTO: 28.5 % (ref 36.6–50.3)
HGB BLD-MCNC: 8.5 G/DL (ref 12.1–17)
HGB FREE PLAS-MCNC: 1.1 MG/DL (ref 0–4.9)
MAGNESIUM SERPL-MCNC: 2.3 MG/DL (ref 1.6–2.4)
MCH RBC QN AUTO: 23.9 PG (ref 26–34)
MCHC RBC AUTO-ENTMCNC: 29.8 G/DL (ref 30–36.5)
MCV RBC AUTO: 80.3 FL (ref 80–99)
NRBC # BLD: 0 K/UL (ref 0–0.01)
NRBC BLD-RTO: 0 PER 100 WBC
P-R INTERVAL, ECG05: 128 MS
PF4 HEPARIN CMPLX AB SER-ACNC: 0.1 OD (ref 0–0.4)
PHOSPHATE SERPL-MCNC: 3.4 MG/DL (ref 2.6–4.7)
PLATELET # BLD AUTO: 264 K/UL (ref 150–400)
PMV BLD AUTO: 10.7 FL (ref 8.9–12.9)
POTASSIUM SERPL-SCNC: 4.6 MMOL/L (ref 3.5–5.1)
PROCALCITONIN SERPL-MCNC: 0.28 NG/ML
PROT SERPL-MCNC: 6 G/DL (ref 6.4–8.2)
Q-T INTERVAL, ECG07: 312 MS
QRS DURATION, ECG06: 74 MS
QTC CALCULATION (BEZET), ECG08: 406 MS
RBC # BLD AUTO: 3.55 M/UL (ref 4.1–5.7)
RBC # BLD AUTO: 4.41 X10E6/UL (ref 4.14–5.8)
SERVICE CMNT-IMP: ABNORMAL
SODIUM SERPL-SCNC: 135 MMOL/L (ref 136–145)
VENTRICULAR RATE, ECG03: 102 BPM
WBC # BLD AUTO: 5.7 K/UL (ref 4.1–11.1)

## 2023-01-13 PROCEDURE — 74011250637 HC RX REV CODE- 250/637: Performed by: NURSE PRACTITIONER

## 2023-01-13 PROCEDURE — 97116 GAIT TRAINING THERAPY: CPT

## 2023-01-13 PROCEDURE — 51798 US URINE CAPACITY MEASURE: CPT

## 2023-01-13 PROCEDURE — 74011250637 HC RX REV CODE- 250/637: Performed by: STUDENT IN AN ORGANIZED HEALTH CARE EDUCATION/TRAINING PROGRAM

## 2023-01-13 PROCEDURE — 74011250637 HC RX REV CODE- 250/637: Performed by: INTERNAL MEDICINE

## 2023-01-13 PROCEDURE — 80162 ASSAY OF DIGOXIN TOTAL: CPT

## 2023-01-13 PROCEDURE — 74011000250 HC RX REV CODE- 250: Performed by: STUDENT IN AN ORGANIZED HEALTH CARE EDUCATION/TRAINING PROGRAM

## 2023-01-13 PROCEDURE — 74011250636 HC RX REV CODE- 250/636: Performed by: NURSE PRACTITIONER

## 2023-01-13 PROCEDURE — 83880 ASSAY OF NATRIURETIC PEPTIDE: CPT

## 2023-01-13 PROCEDURE — 84100 ASSAY OF PHOSPHORUS: CPT

## 2023-01-13 PROCEDURE — 74011250637 HC RX REV CODE- 250/637: Performed by: FAMILY MEDICINE

## 2023-01-13 PROCEDURE — 80053 COMPREHEN METABOLIC PANEL: CPT

## 2023-01-13 PROCEDURE — 74011250636 HC RX REV CODE- 250/636: Performed by: STUDENT IN AN ORGANIZED HEALTH CARE EDUCATION/TRAINING PROGRAM

## 2023-01-13 PROCEDURE — 84145 PROCALCITONIN (PCT): CPT

## 2023-01-13 PROCEDURE — 85027 COMPLETE CBC AUTOMATED: CPT

## 2023-01-13 PROCEDURE — 97535 SELF CARE MNGMENT TRAINING: CPT

## 2023-01-13 PROCEDURE — 74011636637 HC RX REV CODE- 636/637: Performed by: CLINICAL NURSE SPECIALIST

## 2023-01-13 PROCEDURE — 99233 SBSQ HOSP IP/OBS HIGH 50: CPT | Performed by: NURSE PRACTITIONER

## 2023-01-13 PROCEDURE — 93005 ELECTROCARDIOGRAM TRACING: CPT

## 2023-01-13 PROCEDURE — 83735 ASSAY OF MAGNESIUM: CPT

## 2023-01-13 PROCEDURE — 65660000001 HC RM ICU INTERMED STEPDOWN

## 2023-01-13 PROCEDURE — 36415 COLL VENOUS BLD VENIPUNCTURE: CPT

## 2023-01-13 PROCEDURE — 82962 GLUCOSE BLOOD TEST: CPT

## 2023-01-13 RX ORDER — INSULIN LISPRO 100 [IU]/ML
6 INJECTION, SOLUTION INTRAVENOUS; SUBCUTANEOUS
Status: DISCONTINUED | OUTPATIENT
Start: 2023-01-13 | End: 2023-01-19 | Stop reason: HOSPADM

## 2023-01-13 RX ORDER — FINASTERIDE 5 MG/1
5 TABLET, FILM COATED ORAL
Status: DISCONTINUED | OUTPATIENT
Start: 2023-01-13 | End: 2023-01-19 | Stop reason: HOSPADM

## 2023-01-13 RX ADMIN — Medication 3 MG: at 21:21

## 2023-01-13 RX ADMIN — SODIUM CHLORIDE, PRESERVATIVE FREE 10 ML: 5 INJECTION INTRAVENOUS at 21:22

## 2023-01-13 RX ADMIN — ACETAMINOPHEN 650 MG: 325 TABLET ORAL at 21:21

## 2023-01-13 RX ADMIN — MILRINONE LACTATE IN DEXTROSE 0.25 MCG/KG/MIN: 200 INJECTION, SOLUTION INTRAVENOUS at 13:58

## 2023-01-13 RX ADMIN — SODIUM CHLORIDE, PRESERVATIVE FREE 10 ML: 5 INJECTION INTRAVENOUS at 17:13

## 2023-01-13 RX ADMIN — IPRATROPIUM BROMIDE 2 SPRAY: 21 SPRAY, METERED NASAL at 21:27

## 2023-01-13 RX ADMIN — TAMSULOSIN HYDROCHLORIDE 0.4 MG: 0.4 CAPSULE ORAL at 10:00

## 2023-01-13 RX ADMIN — Medication 4 UNITS: at 10:03

## 2023-01-13 RX ADMIN — SODIUM CHLORIDE, PRESERVATIVE FREE 10 ML: 5 INJECTION INTRAVENOUS at 07:03

## 2023-01-13 RX ADMIN — IVABRADINE 2.5 MG: 5 TABLET, FILM COATED ORAL at 17:11

## 2023-01-13 RX ADMIN — ASPIRIN 81 MG: 81 TABLET, COATED ORAL at 10:00

## 2023-01-13 RX ADMIN — Medication 6 UNITS: at 17:12

## 2023-01-13 RX ADMIN — MIRTAZAPINE 7.5 MG: 15 TABLET, FILM COATED ORAL at 21:21

## 2023-01-13 RX ADMIN — Medication 2 UNITS: at 21:26

## 2023-01-13 RX ADMIN — Medication 3 UNITS: at 13:57

## 2023-01-13 RX ADMIN — PANTOPRAZOLE SODIUM 40 MG: 40 TABLET, DELAYED RELEASE ORAL at 07:03

## 2023-01-13 RX ADMIN — Medication: at 10:02

## 2023-01-13 RX ADMIN — Medication 6 UNITS: at 13:57

## 2023-01-13 RX ADMIN — QUETIAPINE FUMARATE 50 MG: 25 TABLET ORAL at 21:21

## 2023-01-13 RX ADMIN — SPIRONOLACTONE 12.5 MG: 25 TABLET ORAL at 10:00

## 2023-01-13 RX ADMIN — INSULIN GLARGINE 6 UNITS: 100 INJECTION, SOLUTION SUBCUTANEOUS at 21:26

## 2023-01-13 RX ADMIN — Medication: at 17:14

## 2023-01-13 RX ADMIN — FINASTERIDE 5 MG: 5 TABLET, FILM COATED ORAL at 17:11

## 2023-01-13 RX ADMIN — EMPAGLIFLOZIN 10 MG: 10 TABLET, FILM COATED ORAL at 10:00

## 2023-01-13 RX ADMIN — HEPARIN SODIUM 5000 UNITS: 5000 INJECTION INTRAVENOUS; SUBCUTANEOUS at 21:26

## 2023-01-13 RX ADMIN — IPRATROPIUM BROMIDE 2 SPRAY: 21 SPRAY, METERED NASAL at 10:02

## 2023-01-13 RX ADMIN — HEPARIN SODIUM 5000 UNITS: 5000 INJECTION INTRAVENOUS; SUBCUTANEOUS at 10:00

## 2023-01-13 NOTE — CONSULTS
Palliative Medicine Consult  [place]: [phone] ([phone])    Patient Name: Bayron Carvajal  YOB: 1950    Date of Initial Consult: 1/12/2023  Reason for Consult: LVAD work-up  Requesting Provider: Rigo Khalil NP  Primary Care Physician: Irina Alcantara MD     SUMMARY:   Bayron Carvajal is a 71 y.o. who was admitted on 12/22/2022 from home with a diagnosis of Sepsis, acute hypoxic respiratory failure 2/2 combination of decompensated congestive heart failure and probable pneumonia as well as TANNER 2/2  bilateral outlet obstruction resulting in urinary retention and bilateral hydronephrosis. Mr. Johann Jackson had just been discharged from 5 day  hospitalization on 12/16/2022 for CHF exacerbation and before that he was hospitalized  12/5-12/8/22 for NSTEMI and underwent cath. PMH: Cardiomyopathy, CHF, CAD, s/p CABG, NSTEMI x2 stents, deafness (+hearing aide, hears best in left ear), PAD, HTN, HLD, DM 2, CKD. Course of Hospitalization has been prolonged and complicated, Cardiogenic shock,  EF 25 %, requiring dobutamine, work up for LVAD candidacy under way. Renal function improved. Current medical issues leading to Palliative Medicine involvement include: LVAD candidacy work up. PALLIATIVE DIAGNOSES:   Palliative care encounter  End-stage heart failure  LVAD work-up  Weakness, generalized   advance care planning discussion  DNR discussion  Goals of care discussion         PLAN:   Prior to visit completed extensive chart review, including review of current hospitalization documentation, MARS, vitals, results of labs and imaging, as well as documentation from current and prior hospitalizations. I also spoke with Rigo Khalil NP with advanced heart failure team.  I met with Mr. Kendall, no family at bedside at the time. He was awake, alert, oriented to time, place and situation, but mostly able to provide general information regarding hospitalization, not much detail.   Mr. Johann Jackson knows that he is undergoing a work up for heart procedure, though he did not recall the name of the procedure. He told me that there are risks involved, but that if he doesn't have the procedure, he wont live very long. We discussed CPR. Mr. Tammy Cortes was familiar with CPR  but did not express his personal choice, however when I told him that he had a DNR and what that meant, he replied \"OK\". Though I do not think he will likely remember this, I did tell him that should her undergo LVAD procedure , while in surgery, they would likely have him be a Full code, explaining what that meant, he told me that he understood and was oK with this as well. I will reach out to Mrs. Kendall tomorrow to introduce Palliative services, assess her understanding of LVAD workup and what to expect. Please contact me via [employer] with any questions or concerns. Thank you for including the palliative team in Mr. Teague care. It was a pleasure meeting him today.   Initial consult note routed to primary continuity provider and/or primary health care team members  Communicated plan of care with: Palliative IDT, [facility] Team     GOALS OF CARE / TREATMENT PREFERENCES:     GOALS OF CARE: Patient indicated plan is to move forward with LVAD workup       TREATMENT PREFERENCES:   Code Status: DNR    Patient and family's personal goals include: Pending    Important upcoming milestones or family events: Pending    The patient identifies the following as important for living well: Pending      Advance Care Planning:  [x] [facility] Interdisciplinary Team has updated the ACP Navigator with Health Care Decision Maker and Patient Capacity      Primary Decision Maker: Fermin Chicas - [phone]    Secondary Decision Maker: Rno Guardado - Juan - [phone]  Advance Care Planning 12/22/2022   Patient's Healthcare Decision Maker is: Legal Next of Kin   Primary Decision Maker Name -   Primary Decision Maker Phone Number -   Primary Decision Maker Relationship to Patient -   Confirm Advance Directive None   Patient Would Like to Complete Advance Directive No   Does the patient have other document types -               Other:    As far as possible, the palliative care team has discussed with patient / health care proxy about goals of care / treatment preferences for patient. HISTORY:     History obtained from: medical record/patient    CHIEF COMPLAINT: Denied initially, then stated sleep is nearly impossible, continuous interruptions    HPI/SUBJECTIVE:    The patient is:   [x] Verbal and participatory  [] Non-participatory due to:   He denied pain, denied feeling short of breath, reported feeling tired, prolonged hospitalization, difficulty sleeping    Clinical Pain Assessment (nonverbal scale for severity on nonverbal patients):              Duration: for how long has pt been experiencing pain (e.g., 2 days, 1 month, years)  Frequency: how often pain is an issue (e.g., several times per day, once every few days, constant)     FUNCTIONAL ASSESSMENT:     Palliative Performance Scale (PPS):          PSYCHOSOCIAL/SPIRITUAL SCREENING:     Palliative IDT has assessed this patient for cultural preferences / practices and a referral made as appropriate to needs (Cultural Services, Patient Advocacy, Ethics, etc.)    Any spiritual / Advent concerns:  [] Yes /  [x] No   If \"Yes\" to discuss with pastoral care during IDT     Does caregiver feel burdened by caring for their loved one:   [] Yes /  [] No /  [x] No Caregiver Present/Available [] No Caregiver [] Pt [facility]  If \"Yes\" to discuss with social work during IDT    Anticipatory grief assessment:   [x] Normal  / [] Maladaptive     If \"Maladaptive\" to discuss with social work during IDT    ESAS Anxiety:      ESAS Depression:          REVIEW OF SYSTEMS:     Positive and pertinent negative findings in ROS are noted above in HPI.   The following systems were [x] reviewed / [] unable to be reviewed as noted in HPI  Other findings are noted below. Systems: constitutional, ears/nose/mouth/throat, respiratory, gastrointestinal, genitourinary, musculoskeletal, integumentary, neurologic, psychiatric, endocrine. Positive findings noted below. Modified ESAS Completed by: provider                                   Stool Occurrence(s): 0        PHYSICAL EXAM:     From RN flowsheet:  Wt Readings from Last 3 Encounters:   01/12/23 94 lb 12.8 oz (43 kg)   12/19/22 129 lb (58.5 kg)   12/16/22 126 lb 8.7 oz (57.4 kg)     Blood pressure (!) 107/49, pulse (!) 113, temperature 99.2 °F (37.3 °C), resp. rate 26, height 5' 9\" (1.753 m), weight 94 lb 12.8 oz (43 kg), SpO2 98 %.     Pain Scale 1: Numeric (0 - 10)  Pain Intensity 1: 0  Pain Onset 1: post line insertion  Pain Location 1: Neck  Pain Orientation 1: Right  Pain Description 1: Aching  Pain Intervention(s) 1: Medication (see MAR)  Last bowel movement, if known:     Constitutional: Appears older than stated age, thinner,  Eyes: pupils equal, anicteric  ENMT: no nasal discharge, moist mucous membranes  Cardiovascular: regular rhythm, distal pulses intact  Respiratory: breathing not labored, symmetric  Gastrointestinal: soft non-tender, +bowel sounds  Musculoskeletal: no deformity, no tenderness to palpation  Skin: warm, dry  Neurologic: following commands, moving all extremities  Psychiatric: full affect, no hallucinations  Other:       HISTORY:     Active Problems:    Sepsis ([facility] (18/61/2201)      Systolic CHF, acute on chronic (HCC) (12/29/2022)      Receiving inotropic medication (12/29/2022)      Past Medical History:   Diagnosis Date    CAD (coronary artery disease) 11/10/2016    NSTEMI & 2 stents    Deafness 10/28/2012    DM (diabetes mellitus) ([facility])     Elevated cholesterol     Hypertension     [facility] 11/10/2016      Past Surgical History:   Procedure Laterality Date    COLONOSCOPY N/A 6/28/2018    COLONOSCOPY performed by Flakita Tubbs MD at [facility] ENDOSCOPY    HX APPENDECTOMY      LA CARDIAC SURG PROCEDURE UNLIST  11/11/2016    2 stents      Family History   Problem Relation Age of Onset    Heart Disease Father     Heart Attack Father     Hypertension Mother     Elevated Lipids Brother     Elevated Lipids Brother     No Known Problems Sister     Elevated Lipids Brother     No Known Problems Son     No Known Problems Daughter     Anesth Problems Neg Hx       History reviewed, no pertinent family history. Social History     Tobacco Use    Smoking status: Never     Passive exposure: Never    Smokeless tobacco: Never   Substance Use Topics    Alcohol use:  Yes     Alcohol/week: 2.0 standard drinks     Types: 1 Cans of beer, 1 Shots of liquor per week     Comment: rarely     No Known Allergies   Current Facility-Administered Medications   Medication Dose Route Frequency    milrinone (PRIMACOR) 20 MG/100 ML D5W infusion  0.25 mcg/kg/min IntraVENous CONTINUOUS    [START ON 1/13/2023] finasteride (PROSCAR) tablet 5 mg  5 mg Oral DAILY    insulin glargine (LANTUS) injection 6 Units  6 Units SubCUTAneous QHS    insulin lispro (HUMALOG) injection 4 Units  4 Units SubCUTAneous TID WITH MEALS    insulin lispro (HUMALOG) injection   SubCUTAneous AC&HS    dextrose 10 % infusion 0-250 mL  0-250 mL IntraVENous PRN    spironolactone (ALDACTONE) tablet 12.5 mg  12.5 mg Oral DAILY    melatonin tablet 3 mg  3 mg Oral QHS PRN    empagliflozin (JARDIANCE) tablet 10 mg  10 mg Oral DAILY    dextrose 10 % infusion 0-250 mL  0-250 mL IntraVENous PRN    albuterol-ipratropium (DUO-NEB) 2.5 MG-0.5 MG/3 ML  3 mL Nebulization Q6H PRN    digoxin (LANOXIN) tablet 0.125 mg  0.125 mg Oral DAILY    mirtazapine (REMERON) tablet 7.5 mg  7.5 mg Oral QHS    [Held by provider] clopidogreL (PLAVIX) tablet 75 mg  75 mg Oral DAILY    [Held by provider] potassium chloride SR (KLOR-CON 10) tablet 40 mEq  40 mEq Oral BID    pantoprazole (PROTONIX) tablet 40 mg  40 mg Oral ACB heparin (porcine) injection 5,000 Units  5,000 Units SubCUTAneous Q12H    QUEtiapine (SEROquel) tablet 50 mg  50 mg Oral QHS    lidocaine 4 % patch 1 Patch  1 Patch TransDERmal Q24H    balsam peru-castor oiL (VENELEX) ointment   Topical BID    alteplase (CATHFLO) 1 mg in sterile water (preservative free) 1 mL injection  1 mg InterCATHeter PRN    bacitracin 500 unit/gram packet 1 Packet  1 Packet Topical PRN    glucose chewable tablet 16 g  4 Tablet Oral PRN    glucagon (GLUCAGEN) injection 1 mg  1 mg IntraMUSCular PRN    senna-docusate (PERICOLACE) 8.6-50 mg per tablet 1 Tablet  1 Tablet Oral BID PRN    bisacodyL (DULCOLAX) suppository 10 mg  10 mg Rectal QHS PRN    sodium chloride (NS) flush 5-40 mL  5-40 mL IntraVENous Q8H    sodium chloride (NS) flush 5-40 mL  5-40 mL IntraVENous PRN    acetaminophen (TYLENOL) tablet 650 mg  650 mg Oral Q6H PRN    Or    acetaminophen (TYLENOL) suppository 650 mg  650 mg Rectal Q6H PRN    polyethylene glycol (MIRALAX) packet 17 g  17 g Oral DAILY PRN    ondansetron (ZOFRAN ODT) tablet 4 mg  4 mg Oral Q8H PRN    Or    ondansetron (ZOFRAN) injection 4 mg  4 mg IntraVENous Q6H PRN    aspirin delayed-release tablet 81 mg  81 mg Oral DAILY    ipratropium (ATROVENT) 21 mcg (0.03 %) nasal spray 2 Spray  2 Spray Both Nostrils Q12H    tamsulosin (FLOMAX) capsule 0.4 mg  0.4 mg Oral DAILY    sodium chloride (NS) flush 5-40 mL  5-40 mL IntraVENous PRN          LAB AND IMAGING FINDINGS:     Lab Results   Component Value Date/Time    WBC 6.6 01/11/2023 12:51 AM    HGB 8.5 (L) 01/11/2023 12:51 AM    PLATELET 119 18/08/5824 12:51 AM     Lab Results   Component Value Date/Time    Sodium 135 (L) 01/12/2023 05:00 AM    Potassium 4.5 01/12/2023 05:00 AM    Chloride 106 01/12/2023 05:00 AM    CO2 24 01/12/2023 05:00 AM    BUN 27 (H) 01/12/2023 05:00 AM    Creatinine 1.29 01/12/2023 05:00 AM    Calcium 9.7 01/12/2023 05:00 AM    Magnesium 2.3 01/12/2023 05:00 AM    Phosphorus 3.5 01/12/2023 05:00 AM Lab Results   Component Value Date/Time    Alk. phosphatase 220 (H) 01/12/2023 05:00 AM    Protein, total 7.1 01/12/2023 05:00 AM    Albumin 3.3 (L) 01/12/2023 05:00 AM    Globulin 3.8 01/12/2023 05:00 AM     Lab Results   Component Value Date/Time    INR 1.0 01/11/2023 06:14 PM    Prothrombin time 10.8 01/11/2023 06:14 PM    aPTT 27.9 01/12/2023 05:00 AM      Lab Results   Component Value Date/Time    Iron 81 01/04/2023 02:31 AM    TIBC 277 01/04/2023 02:31 AM    Iron % saturation 29 01/04/2023 02:31 AM    Ferritin 463 (H) 01/04/2023 02:31 AM      No results found for: PH, PCO2, PO2  No components found for: [place]   Lab Results   Component Value Date/Time    CK 87 01/04/2023 02:31 AM    CK - MB 5.3 (H) 11/10/2016 03:44 PM                Total time: 50 min  Counseling / coordination time, spent as noted above: 35 minutes  > 50% counseling / coordination?: yes    Prolonged service was provided for  []30 min   []75 min in face to face time in the presence of the patient, spent as noted above. Time Start:   Time End:   Note: this can only be billed with 46491 (initial) or 71701 (follow up). If multiple start / stop times, list each separately.

## 2023-01-13 NOTE — DIABETES MGMT
[address] [place] [address]  DIABETES MANAGEMENT CONSULT    Consulted by  Mynor Green MD   for advanced nursing evaluation and care for inpatient blood glucose management. Evaluation and Action Plan   Destiny Hernandez is a 71year old gentleman, with Type 2 Diabetes with a recent A1C of 6.5%, who was admitted with acute on chronic HFrEF and cardiogenic shock. He was admitted 2 weeks prior for similar complaint and antihyperglycemic agents were adjusted on discharge. Metformin was stopped due to decline in GFR and Jardiance switched to [place] (unclear why). He was compliant with his Debarah Wong, [place] and Glipizide up until day prior to this hospitalization. His glucose was significantly elevated at 458 on admission, s/t insulin resistance from acute HFrEF, elevated lactate and impaired perfusion. Low dose basal insulin was started and sufficient to control BG. There was a suspicion for myocarditis and hydrocortisone 50mg twice daily was started. Basal insulin was escalated to 48 units but with an exaggerated post-prandial glucose spike to over 400 daily- related to steroids in the post-prandial state and high carb containing nutritional shakes. Steroids weaned to off and basal now at baseline- 8 units daily. 10mg Jardiance daily was started for heart failure management. Despite Jardiance and Glargine, he continued to have pre-prandial hyperglycemia this weekend and bolus insulin resumed. His fasting BG is below goal-77 on am labs- and will reduce basal insulin. Inpatient BG goal is 140-180mg/dl.     Management Rationale Action Plan   Medication   Basal needs Diabetes: 0.1 units/kg   6 units Lantus HS   Nutritional needs Using moderate sensitivity based on   oral intake and degree of pre-prandial   hyperglycemia off steroids Increased to 6 units Humalog/meal    Hold if patient is NPO or consumes less than 50% of carbohydrates on meal tray   Corrective insulin Using high sensitivity now off steroids HIGH Sensitivity ACHS   Additional Recommendations. POC glucose ACHS    Consistent carbohydrate diet (60 grams CHO/meal). Appreciate RD help with adjustments. Jardiance per F     Discharge Planning:   Continue Januvia at PTA dose  Adjust Glipizide to 5 mg once daily in the am  SGLT2 per HF team: Has new Rx for [facility] [place], was switched from [facility] at last hospital admission  [facility] with PCP for ongoing routine diabetes care  Patient to check glucose before meals and bedtime. Make a log and present to PCP for interpretation. Initial Presentation   Leandra Carver is a 71 y.o. male who presented to the ED 12/22/22 with a 3-4 day c/o gradually worsening dyspnea, fatigue, chills, constipation and RLQ pain. He endorses difficulty voiding despite compliance with lasix. In the ED, he was hypoxic and started on supplemental O2 and diuresis. LAB: WBC 25.1, , GFR 25, Lac 5.2, Trop 1096, BNP 6905  CXR: Widespread interstitial opacities bilateral mid to lower lung opacities  concerning for edema and/or atypical infection. Small bilateral pleural  effusions.   CT: CT revealed distended bladder  EKG: Sinus tachycardia   Biatrial enlargement   Rightward axis   Marked ST abnormality, possible inferior subendocardial injury   Marked ST abnormality, possible lateral subendocardial injury   Abnormal ECG     Did have a hospital admission for HFrEF 12/11/22-12/16/22    HX:   Past Medical History:   Diagnosis Date    CAD (coronary artery disease) 11/10/2016    NSTEMI & 2 stents    Deafness 10/28/2012    DM (diabetes mellitus) ([facility] [address]     Elevated cholesterol     Hypertension     NSTEMI (non-ST elevated myocardial infarction) ([facility] [address] 11/10/2016    CKD  CAD with CABG 2018    INITIAL DX:   Sepsis ([facility] [address] [A41.9]     Current Treatment     TX: IVF, Diuresis, Supplemental O2    Hospital Course   Clinical progress has been complicated by:   21/12: Initial admission with hospitalist service but with progressive hypoxia early requiring CCU transfer. Elevated troponin/BNP. BPH and hydronephrosis. Pt pan cultured, given dose of abx and dose of IVP lasix. Pt placed on BiPAP w/ improvement in oxygenation. 12/23: Seen by urology for bilateral hydronephrosis related to distended bladder. Maintain urinary catheter. 12/24: CVL placement   12/26: Decreased urine output, started on bumex and dobutamine gtt. Cardiology consult. Continue inotrope. 12/27: Advanced Heart Failure Consult. Started HF evaluation. Nephrology consult: Decreased bumex gtt  12/28: Mini pulse steroids per cardiology for treatment of myocarditis. Insulin gtt. 12/30: Transferred to hospitalist service. 1/3: Cardiac MRI: Ischemic CM  1/6: Steroids complete. Daryn Glendy started  1/9: Right heart cath: Normal to low filling pressures but mildly reduced CI. CI of 2 l/min/m2 off dobutamine  PICC Placed and Milrinone started. Urology consult for hematuria. Held plavix x1 day. GI consult for LVAD work-up. Diabetes History   Type 2 Diabetes  Ambulatory BG management provided by: PCP Saul Bateman MD    Diabetes-related Medical History  Acute complications  Acute hyperglycemia  Neurological complications  Peripheral neuropathy  Microvascular disease  Nephropathy  Macrovascular disease  CAD  Other associated conditions     CHF    Diabetes Medication History  Key Antihyperglycemic Medications               dapagliflozin (FARXIGA) 10 mg tab tablet (Taking) Take 1 Tablet by mouth daily.     glipiZIDE (GLUCOTROL) 5 mg tablet (Taking) Take 1 tablet by mouth twice daily    Januvia 50 mg tablet (Taking) Take 1 tablet by mouth once daily             Diabetes self-management practices:   Eating pattern   Eats 3 small meals daily  [x] Breakfast  2 Eggs, Coffee  [x] Lunch   [place]  [x] Dinner   \" Food\" Bread, rice, lentils   [x] Bedtime  [place]  [x] Snacks   Afternoon snack  [x] Beverages  Water, Coffee  Physical activity pattern   Sedentary Monitoring pattern  Does not check BG  Taking medications pattern  [x] Consistent administration  [x] Affordable  Social determinants of health impacting diabetes self-management practices   Concerned that you need to know more about how to stay healthy with diabetes  Overall evaluation:    [x] Achieving A1c target with drug therapy & self-care practices    Subjective   I feel good today. Brigida Larsen     Objective   Physical exam  General Underweight male in mild distress/ill-appearing. Conversant and cooperative  Neuro  Alert, oriented   Vital Signs Visit Vitals  BP (!) 96/46   Pulse 94   Temp 98 °F (36.7 °C)   Resp 18   Ht 5' 9\" (1.753 m)   Wt 51.9 kg (114 lb 6.7 oz)   SpO2 99%   BMI 16.90 kg/m²     Skin  Warm and dry. Acanthosis noted along neckline. No lipohypertrophy or lipoatrophy noted at injection sites   Heart   Regular rate and rhythm. 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        Blood glucose pattern    Significant diabetes-related events over the past 24-72 hours  A1C  6.5% 22 (down from 7.0 10/12/22)  Fasting B/lab, 128 POC BG  Pre-prandial: 167-237  Basal: Lantus 6 units HS  Bolus: 4 units in the last 24h  Correction: 9 unit in the last 24h  Jardiance 10mg daily    Ultimate plan is to D/C home with inotrope support as bridge to LVAD. Assessment and Nursing Intervention   Nursing Diagnosis Risk for unstable blood glucose pattern   Nursing Intervention Domain 5250 Decision-making Support   Nursing Interventions Examined current inpatient diabetes/blood glucose control   Explored factors facilitating and impeding inpatient management  Explored corrective strategies with patient and responsible inpatient provider   Informed patient of rational for insulin strategy while hospitalized     Nursing Diagnosis [address]2930 Ineffective Health Management   Nursing Intervention Domain 5250 Decision-making Support   Nursing Interventions Identified diabetes self-management practices impeding diabetes control  Discussed diabetes survival skills related to  Healthy Plate eating plan; given handouts  Role of physical activity in improving insulin sensitivity and action  Procedure for blood glucose monitoring & options for low-cost products  Medications plan at discharge     Billing Code(s)   No charge    Before making these care recommendations, I personally reviewed the hospitalization record, including notes, laboratory & diagnostic data and current medications, and examined the patient at the bedside (circumstances permitting) before making care recommendations. More than fifty (50) percent of the time was spent in patient counseling and/or care coordination.   Total minutes: 13    De Lane, CNS  Diabetes Clinical Nurse Specialist  [facility]  Access via [employer]

## 2023-01-13 NOTE — PROGRESS NOTES
Transitions of Care Plan  RUR: 24% - high  Clinical Update: milrinone started; LVAD workup; needs plavix washout - anticipate d/c tomorrow and return outpatient  Consults: [facility]; GI  Baseline: independent without DME; resides w wife  Barriers to Discharge: medical  Disposition:   [address]  Estimated Discharge Date: 1/16/23    CM provided patient with list of DME supply agencies, FREE foundation information, and personal care agency list.    CM sent home health referral to [facility] requesting response if able to follow new inotrope patient and accept his [facility] Medicare Dual [facility] w [facility] Orders to follow. PICC placed - will send note to [employer] care with order once available. CM received acceptance from [facility] for [facility] services. CM will continue to follow.     Disposition:  [address]  Transportation - Family  DME - rollator    Karin Bates, MPH  Care Manager [facility]  Available via [address] [place] [address] or

## 2023-01-13 NOTE — PROGRESS NOTES
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, LVEF 25-30%, stage C, NYHA class IIIA  Unable to up-titrate GDMT for HF due to hypotension and orthostasis likely due to overdiuresis; now weaning dobutamine gtt as BP, CrCl, pro-NT-BNP and lactic improves with holding diuretics; still dry by [facility] today with index 1.8  Most likely etiology of cardiomyopathy: CAD +/- TRISTAN +/- inappropriate sinus tach +/- undergoing workup (cardiac MRI with ischemic CM, PYP equivocal, gammopathy and genetics pending)  Patient and family would like to pursue LVAD evaluation and plan to DC patient on inotrope as bridge to decision on LVAD.        RECOMMENDATIONS:  Continue Milrinone 0.25mcg/kg/min- unable to uptitrate due to HR and BP  Orthostatics today   Consider Bbrx when orthostasis resolved   Once orthosthasis resolves then will start ARB/ARNi  Continue Spironolactone 12.5mg daily  Continue jardiance 10mg daily  Start Corlanor 2.5mg BID and up titrate for goal HR 80s  MOCA from 22, consistent with mild cognitive impairment  Inpatient sleep medicine consult pending (high risk for TRISTAN)  Hold digoxin 0.125mcg today for elevated level, check digoxin level daily (today 1.3 goal 0.7-1.2)  Continue baby aspirin   Ok to stop Plavix per Dr. 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, LVEF 25-30% and 23% (by cMRI 1/3/23)  Undergoing evaluation for cardiac amyloidosis with cMRI  Coronary artery disease  CAD s/p CABG x 2: further disease best managed medically due to small vessel size   Peripheral arterial disease  Bilateral hydronephrosis s/p donnelly  Cardiac risk factors:  HTN  HL  TRISTAN, STOP-BANG 4  DM2  CKD, stage 3      INTERVAL EVENTS:  Milrinone 0.25  HR up slightly   NAEO  Ambulating well  Improved appetite   Afebrile  SBP 90-100s  HR 90-100s  I/O net neg 1.3L  Cr 1.28  K 4.6  Alb 3.0  T Bili stable          LIFE GOALS:  Lifestyle goals reviewed with the patient. Patient's personal goals include: TBD  Important upcoming milestones or family events: TBD  The patient identifies the following as important for living well: TBD     Patient assessed. High suspicion of sleep apnea due to the following reasons. Will refer for sleep evaluation. [x] Heart Failure  [] Hypertention  [x] Atrial Fibrillation  [] BMI > 30  [] History of stroke  [] Diabetes  [x] Heavy snoring  [] Witnessed apnea  [] Hypoxemia                    HPI:  71 y.o. male who was admitted via ED for worsening SOB, poor appetite, orthopnea and fatigue. Pmhx of CAD s/p CABG, PAD, DM 2, recent admission for HFmrEF earlier this month. Serial TTEs show progressive decline in EF since 3/2021 with the most recent EF at 25-30%. Recent LHC shows diffuse CAD and no interventions indicated. Patient had Jelani [place] recently and there is some thought to myocarditis or other etiology causing worsening HF. The [facility] was consulted for further evaluation and management of HFrEF. CARDIAC IMAGING:  Echo 1/9/23   Left Ventricle: Severely reduced left ventricular systolic function with a visually estimated EF of 25 - 30%.  Left ventricle size is normal. Mildly increased wall thickness. Echo 12/26/22    Left Ventricle: Severely reduced left ventricular systolic function with a visually estimated EF of 25 - 30%. Left ventricle size is normal. Normal wall thickness. There are regional wall motion abnormalities. Grade II diastolic dysfunction with increased LAP. Right Ventricle: Moderately reduced systolic function. TAPSE is abnormal. TAPSE is 1.1 cm. Aortic Valve: Mild stenosis of the aortic valve. AV peak gradient is 13 mmHg. AV peak velocity is 1.8 m/s. Mitral Valve: Not well visualized. Moderate annular calcification at the posterior leaflet of the mitral valve. Mild to moderate regurgitation. Tricuspid Valve: Mildly elevated RVSP. Left Atrium: Left atrium is moderately dilated. 12/8/22    Left Ventricle: Moderately reduced left ventricular systolic function with a visually estimated EF of 35 - 40%. Severe hypokinesis of the following segments: mid anteroseptal, apical anterior, apical septal, apical inferior and apical lateral. Severe hypokinesis of the apex. Mitral Valve: Severely thickened leaflet, at the anterior and posterior leaflets. Severely calcified leaflet, at the anterior and posterior leaflets. Mild annular calcification of the mitral valve. Moderate regurgitation. Left Atrium: Left atrium is mildly dilated. Contrast used: Definity. limited study     EKG 12/22/22 ST, Biatria enlargement, marked ST abnormality     C 12/6/22  1. Normal LVEDP  2. Severe native multivessel coronary artery disease  3. Patent LIMA to LAD and vein graft to distal RCA  4. Recurrent ISR in OM1 stent with now 60 to 70% restenosis  5. Recoil of left main and circumflex stent with now recurrent 40 to 50% stenosis. 6.  Progression of ostial left main disease now to about 60% stenosis  7. Progression of disease in jailed first marginal branch now with diffuse 90% stenosis  8.   High-grade stenosis in the mid to distal right potential femoral artery treated with 6 x 40 mm impact drug-coated balloon angioplasty to reduce the stenosis to less than 40%     NST        HEMODYNAMICS:  RHC 1/9/23  PA 20/9, RA 3, PCWP 8, CI 1.8    CPEST too ill   6MW 300 feet       PHYSICAL EXAM:  Visit Vitals  BP (!) 96/46   Pulse 94   Temp 98 °F (36.7 °C)   Resp 18   Ht 5' 9\" (1.753 m)   Wt 114 lb 6.7 oz (51.9 kg)   SpO2 99%   BMI 16.90 kg/m²     Physical Exam  Vitals and nursing note reviewed. Constitutional:       Appearance: Normal appearance. He is underweight. Cardiovascular:      Rate and Rhythm: Normal rate and regular rhythm. Pulses: Normal pulses. Heart sounds: Normal heart sounds. Pulmonary:      Effort: No respiratory distress. Breath sounds: Normal breath sounds. Abdominal:      General: There is no distension. Palpations: Abdomen is soft. Musculoskeletal:         General: No swelling. Skin:     General: Skin is warm and dry. Neurological:      General: No focal deficit present. Mental Status: He is alert and oriented to person, place, and time. Psychiatric:         Mood and Affect: Mood normal.        REVIEW OF SYSTEMS:  Review of Systems   Constitutional:  Negative for chills, fever and malaise/fatigue. Respiratory:  Negative for cough and shortness of breath. Cardiovascular:  Negative for chest pain, palpitations and leg swelling. Gastrointestinal:  Negative for constipation, heartburn, nausea and vomiting. Musculoskeletal:  Negative for falls and myalgias. Neurological:  Negative for dizziness and headaches. Psychiatric/Behavioral:  Negative for depression.         PAST MEDICAL HISTORY:  Past Medical History:   Diagnosis Date    CAD (coronary artery disease) 11/10/2016    NSTEMI & 2 stents    Deafness 10/28/2012    DM (diabetes mellitus) ([facility] [address]     Elevated cholesterol     Hypertension     NSTEMI (non-ST elevated myocardial infarction) ([facility] [address] 11/10/2016       PAST SURGICAL HISTORY:  Past Surgical History:   Procedure Laterality Date    COLONOSCOPY N/A 6/28/2018    COLONOSCOPY performed by Mortimer Smiling, MD at [facility] ENDOSCOPY    [address] UNLIST  11/11/2016    2 stents       FAMILY HISTORY:  Family History   Problem Relation Age of Onset    Heart Disease Father     Heart Attack Father     Hypertension Mother     Elevated Lipids Brother     Elevated Lipids Brother     No Known Problems Sister     Elevated Lipids Brother     No Known Problems Son     No Known Problems Daughter     Anesth Problems Neg Hx        SOCIAL HISTORY:  Social History     Socioeconomic History    Marital status:    Tobacco Use    Smoking status: Never     Passive exposure: Never    Smokeless tobacco: Never   Vaping Use    Vaping Use: Never used   Substance and Sexual Activity    Alcohol use: Yes     Alcohol/week: 2.0 standard drinks     Types: 1 Cans of beer, 1 Shots of liquor per week     Comment: rarely    Drug use: No    Sexual activity: Yes     Social Determinants of Health     Financial Resource Strain: Medium Risk    Difficulty of Paying Living Expenses: Somewhat hard   Food Insecurity: Food Insecurity Present    Worried About Running Out of Food in the Last Year: Never true    Ran Out of Food in the Last Year: Often true       LABORATORY RESULTS:     Labs Latest Ref Rng & Units 1/13/2023 1/12/2023 1/11/2023 1/10/2023 1/9/2023 1/8/2023 1/7/2023   WBC 4.1 - 11.1 K/uL 5.7 - 6.6 - 9.0 - 7.7   RBC 4.10 - 5.70 M/uL 3.55(L) - 3.65(L) - 3.66(L) - 4.42   Hemoglobin 12.1 - 17.0 g/dL 8.5(L) - 8. 5(L) - 8. 7(L) - 10. 1(L)   Hematocrit 36.6 - 50.3 % 28. 5(L) - 29. 4(L) - 29. 2(L) - 35. 7(L)   MCV 80.0 - 99.0 FL 80.3 - 80.5 - 79. 8(L) - 80.8   Platelets 916 - 752 K/uL 264 - 276 - 254 - 273   Lymphocytes 12 - 49 % - - - - - - 15   Monocytes 5 - 13 % - - - - - - 6   Eosinophils 0 - 7 % - - - - - - 5   Basophils 0 - 1 % - - - - - - 1   Albumin 3.5 - 5.0 g/dL 3. 0(L) 3. 3(L) 3.0(L) 2. 9(L) 2. 9(L) 3.0(L) 3. 1(L) Calcium 8.5 - 10.1 MG/DL 9.4 9.7 9.2 9.3 9.4 9.1 9.1   Glucose 65 - 100 mg/dL 106(H) 118(H) 146(H) 77 192(H) 135(H) 110(H)   BUN 6 - 20 MG/DL 29(H) 27(H) 27(H) 30(H) 36(H) 36(H) 40(H)   Creatinine 0.70 - 1.30 MG/DL 1.28 1.29 1.20 1.14 1.27 1.26 1.07   Sodium 136 - 145 mmol/L 135(L) 135(L) 136 136 138 138 137   Potassium 3.5 - 5.1 mmol/L 4.6 4.5 4.6 4.3 4.6 4.8 4.4   TSH 0.36 - 3.74 uIU/mL - - - - - - -   PSA 0.01 - 4.0 ng/mL - - - - - - -   LDH 85 - 241 U/L - 189 - - - - -   Some recent data might be hidden     Lab Results   Component Value Date/Time    TSH 2.12 12/27/2022 02:36 PM    TSH 4.80 (H) 12/06/2022 03:53 AM    TSH 5.39 (H) 10/12/2022 09:10 AM    TSH 3.53 02/03/2022 11:47 AM    TSH 5.790 (H) 11/21/2019 04:45 PM    TSH 3.08 06/22/2018 01:53 PM    TSH 4.250 05/26/2015 09:43 AM       ALLERGY:  No Known Allergies     CURRENT MEDICATIONS:    Current Facility-Administered Medications:     finasteride (PROSCAR) tablet 5 mg, 5 mg, Oral, DAILY WITH DINNER, Mary Linares MD    milrinone (PRIMACOR) 20 MG/100 ML D5W infusion, 0.25 mcg/kg/min, IntraVENous, CONTINUOUS, Lizette Linton NP, Last Rate: 3.9 mL/hr at 01/12/23 2006, 0.25 mcg/kg/min at 01/12/23 2006    insulin glargine (LANTUS) injection 6 Units, 6 Units, SubCUTAneous, QHS, Cathy Sheldon, CNS, 6 Units at 01/12/23 2123    insulin lispro (HUMALOG) injection 4 Units, 4 Units, SubCUTAneous, TID WITH MEALS, Cathy Sheldon, CNS, 4 Units at 01/12/23 1825    insulin lispro (HUMALOG) injection, , SubCUTAneous, AC&HS, Cathy Sheldon, CNS, 2 Units at 01/12/23 2123    dextrose 10 % infusion 0-250 mL, 0-250 mL, IntraVENous, PRN, Cathy Sheldon, CNS    spironolactone (ALDACTONE) tablet 12.5 mg, 12.5 mg, Oral, DAILY, Lizette Linton NP, 12.5 mg at 01/12/23 0933    melatonin tablet 3 mg, 3 mg, Oral, QHS PRN, Modesta Hodge NP, 3 mg at 01/12/23 2129    empagliflozin (JARDIANCE) tablet 10 mg, 10 mg, Oral, DAILY, Leighton Carlos MD, 10 mg at 01/12/23 6231 dextrose 10 % infusion 0-250 mL, 0-250 mL, IntraVENous, PRN, Cathy Sheldon CNS    albuterol-ipratropium (DUO-NEB) 2.5 MG-0.5 MG/3 ML, 3 mL, Nebulization, Q6H PRN, Najma Rahman MD    digoxin (LANOXIN) tablet 0.125 mg, 0.125 mg, Oral, DAILY, Colleen Astorga NP, 0.125 mg at 01/12/23 0933    mirtazapine (REMERON) tablet 7.5 mg, 7.5 mg, Oral, QHS, Layton Lieberman MD, 7.5 mg at 01/12/23 2123    [Held by provider] clopidogreL (PLAVIX) tablet 75 mg, 75 mg, Oral, DAILY, Diefenderfer, Shara Brittle, NP, 75 mg at 01/12/23 0933    [Held by provider] potassium chloride SR (KLOR-CON 10) tablet 40 mEq, 40 mEq, Oral, BID, Agnes MOLINA MD, 40 mEq at 01/03/23 0838    pantoprazole (PROTONIX) tablet 40 mg, 40 mg, Oral, ACB, Santhosh Xavieroty, DO, 40 mg at 01/13/23 0703    heparin (porcine) injection 5,000 Units, 5,000 Units, SubCUTAneous, Q12H, Santhosh Dillon DO, 5,000 Units at 01/12/23 2123    QUEtiapine (SEROquel) tablet 50 mg, 50 mg, Oral, QHS, David Aguilar, DO, 50 mg at 01/12/23 2123    lidocaine 4 % patch 1 Patch, 1 Patch, TransDERmal, Q24H, Aminta Ward MD, 1 Patch at 01/11/23 1734    balsam peru-castor oiL (VENELEX) ointment, , Topical, BID, Adelfo Major MD, Given at 01/12/23 0933    alteplase (CATHFLO) 1 mg in sterile water (preservative free) 1 mL injection, 1 mg, InterCATHeter, PRN, Aminta Ward MD    bacitracin 500 unit/gram packet 1 Packet, 1 Packet, Topical, PRN, Agnes MOLINA MD    glucose chewable tablet 16 g, 4 Tablet, Oral, PRN, Agnes MOLINA MD    glucagon (GLUCAGEN) injection 1 mg, 1 mg, IntraMUSCular, PRN, Aminta Ward MD    senna-docusate (PERICOLACE) 8.6-50 mg per tablet 1 Tablet, 1 Tablet, Oral, BID PRN, Aminta Ward MD, 1 Tablet at 12/26/22 0933    bisacodyL (DULCOLAX) suppository 10 mg, 10 mg, Rectal, QHS PRN, [place] Daily, Walker MOLINA MD    sodium chloride (NS) flush 5-40 mL, 5-40 mL, IntraVENous, Q8H, Walker Aponte, MD, 10 mL at 01/13/23 0703    sodium chloride (NS) flush 5-40 mL, 5-40 mL, IntraVENous, PRN, Sam Lopez MD, 10 mL at 12/28/22 0845    acetaminophen (TYLENOL) tablet 650 mg, 650 mg, Oral, Q6H PRN, 650 mg at 01/12/23 2129 **OR** acetaminophen (TYLENOL) suppository 650 mg, 650 mg, Rectal, Q6H PRN, Walker Hawk MD    polyethylene glycol (MIRALAX) packet 17 g, 17 g, Oral, DAILY PRN, Sam Lopez MD, 17 g at 12/26/22 0649    ondansetron (ZOFRAN ODT) tablet 4 mg, 4 mg, Oral, Q8H PRN **OR** ondansetron (ZOFRAN) injection 4 mg, 4 mg, IntraVENous, Q6H PRN, Quan MOLINA MD, 4 mg at 12/24/22 0849    aspirin delayed-release tablet 81 mg, 81 mg, Oral, DAILY, Arnold Noriega MD, 81 mg at 01/12/23 0933    ipratropium (ATROVENT) 21 mcg (0.03 %) nasal spray 2 Spray, 2 Spray, Both Nostrils, Q12H, Arnold Noriega MD, 2 [place] at 01/12/23 2124    tamsulosin (FLOMAX) capsule 0.4 mg, 0.4 mg, Oral, DAILY, MichelLittle MD, 0.4 mg at 01/12/23 0933    sodium chloride (NS) flush 5-40 mL, 5-40 mL, IntraVENous, PRN, Sam Lopez MD    PATIENT CARE TEAM:  Patient Care Team:  Sally Rodriguez MD as PCP - General (Family Medicine)  Sally Rodriguez MD as PCP - [facility] Provider  Hortencia Angel MD (Cardiovascular Disease Physician)  Odell Kumar MD (Gastroenterology)  Lakeisha Pratt MD (Cardiothoracic Surgery)  Marcio Maldonado MD (Cardiovascular Disease Physician)  Micah Gross MD (Nephrology)  France Schwartz RN as Care Transitions Nurse  Riddhi Rose MD (Pulmonary Disease)     Thank you for allowing me to participate in this patient's care.     Ambar Bustillo NP   [address] [place] [address], [address]  Phone: [phone]

## 2023-01-13 NOTE — PROGRESS NOTES
[address] [facility] Adult  Hospitalist Group                                                                                          Hospitalist Progress Note  Leonel Barriga MD  Answering service: [phone] from in house phone        Date of Service:  2023  NAME:  Bayron Carvajal  :  1950  MRN:  [id number]      Admission Summary:   Bayron Carvajal is a 71 y.o. male who presents with progressively worsening shortness of breath for past several days. It has gotten worse to the point that he can only ambulate a few steps due to severe dyspnea and near syncope. Patient also noted abdominal distention which is increasing. Patient with known history of cardiomyopathy and recently admitted for CHF exacerbation a week ago. On presentation to the ER, chest x-ray shows diffuse airspace disease atypical infection versus edema. Also patient was noted to have elevated troponin and BNP. Patient was further evaluated by CT abdomen and pelvis which shows massively distended bladder with some bilateral hydronephrosis, subsequently Vasquez was placed. Patient also with significant leukocytosis as well as tachycardic and elevated lactic acid level and subsequently code sepsis was called. Hospitalist service requested admit the patient for further evaluation management.      The patient denies any headache, blurry vision, sore throat, trouble swallowing, trouble with speech, chest pain, SOB, cough, fever, chills, N/V/D, abd pain, urinary symptoms, constipation, recent travels, sick contacts, focal or generalized neurological symptoms, falls, injuries, rashes, contact with COVID-19 diagnosed patients, hematemesis, melena, hemoptysis, hematuria, rashes, denies starting any new medications and denies any other concerns or problems besides as mentioned above       Interval history / Subjective:     Patient seen and examined , reviewed his vitals,labs,careplan  Plans for DC home with PICC and home IV inotropes  Hematuria today- urology consulted      Assessment & Plan:     Acute on chronic systolic congestive heart failure   Cardiogenic shock due to above - off dbutamine on milrinone  CAD   S/p CABG   Ischemic Cardiomyopathy      Appreciate help from Heart failure team.   S/p RHC -RHC today with normal to low filling pressures but mildly reduced CI. PICC line placed for home milrinone     TANNER on CKD stage III   Improving - stable cr  Monitor daily weight , I/O   Adjusting diuretics     GERD -chronic   No change in current Rx. Continue PPI     Depression-stable  Hospital delirium  -resolved  Continue Seroquel as is QHS - at 50mg   Continue Remeron as was PTA      T2DM -on Insulin   Controlled Lauri diet   Insulin NPH Six units BID      BPH -with urinary retention  Continue Tamsulosin and donnelly  Hematuria noted- urology reconsulted     PAD   S/p Right SFA PTCA -followed by Dr. Carla Clay   Normal MARIANELA     Body mass index is 17.39 kg/m². - severe protein lauri malnutrition         Code status: DNR  Prophylaxis: heparin   Care Plan discussed with: pt, RN   Anticipated Disposition: greater than 48 hours,   Being worked up and considered for possible [id number]  [address] Problems  Date Reviewed: 12/5/2022            Codes Class Noted POA    Systolic CHF, acute on chronic ([facility] ICD-10-CM: I50.23  ICD-9-CM: 428.23, 428.0  12/29/2022 Yes        Receiving inotropic medication ICD-10-CM: Z79.899  ICD-9-CM: V49.89  12/29/2022 Unknown        Sepsis ([facility] [address] ICD-10-CM: A41.9  ICD-9-CM: 038.9, 995.91  12/22/2022 Unknown       Review of Systems:   A comprehensive review of systems was negative except for that written in the HPI. Vital Signs:    Last 24hrs VS reviewed since prior progress note.  Most recent are:  Visit Vitals  BP (!) 107/49   Pulse (!) 104   Temp 99.2 °F (37.3 °C)   Resp 26   Ht 5' 9\" (1.753 m)   Wt 43 kg (94 lb 12.8 oz)   SpO2 98%   BMI 14.00 kg/m²     Patient Vitals for the past 24 hrs:   Temp Pulse Resp BP SpO2 01/12/23 2155 -- (!) 104 -- -- --   01/12/23 1948 -- (!) 113 -- -- --   01/12/23 1910 99.2 °F (37.3 °C) (!) 112 26 (!) 107/49 98 %   01/12/23 1800 -- (!) 111 -- -- --   01/12/23 1645 98.6 °F (37 °C) (!) 112 22 (!) 115/48 95 %   01/12/23 1400 -- (!) 114 -- -- --   01/12/23 1200 -- 99 -- -- --   01/12/23 1115 98.5 °F (36.9 °C) (!) 113 24 (!) 131/44 100 %   01/12/23 1000 -- (!) 108 -- -- --   01/12/23 0745 -- 95 -- -- 99 %   01/12/23 0714 98 °F (36.7 °C) 100 16 (!) 97/45 97 %   01/12/23 0547 -- 100 -- -- --   01/12/23 0433 98.8 °F (37.1 °C) (!) 103 16 (!) 105/53 96 %   01/12/23 0351 -- 100 -- -- --   01/12/23 0148 -- 100 -- -- --   01/11/23 2309 99.2 °F (37.3 °C) 99 16 (!) 126/41 97 %        Intake/Output Summary (Last 24 hours) at 1/12/2023 2213  Last data filed at 1/12/2023 2000  Gross per 24 hour   Intake 1249.5 ml   Output 3125 ml   Net -1875.5 ml          Physical Examination:     I had a face to face encounter with this patient and independently examined them on 1/12/2023 as outlined below:          Constitutional:  No acute distress, , pleasant, frail elderly male   ENT:  Oral mucosa dry  EOMI    Resp:  CTA bilaterally. No wheezing/rhonchi/rales. No accessory muscle use. CV:  Regular rhythm, normal rate, no murmurs, gallops, rubs    GI:  Soft, non distended, non tender. Bs+    Musculoskeletal:  No edema, warm, 2+ pulses throughout    Neurologic:  Moves all extremities.   Awake and alert, CN II-XII reviewed            Data Review:    Review and/or order of clinical lab test  Review and/or order of tests in the radiology section of CPT  Review and/or order of tests in the medicine section of CPT      Labs:     Recent Labs     01/11/23  0051   WBC 6.6   HGB 8.5*   HCT 29.4*          Recent Labs     01/12/23  0500 01/11/23  0051 01/10/23  0306   * 136 136   K 4.5 4.6 4.3    106 104   CO2 24 22 24   BUN 27* 27* 30*   CREA 1.29 1.20 1.14   * 146* 77   CA 9.7 9.2 9.3   MG 2.3 2.2 2.1 PHOS 3.5 3.2 3.5       Recent Labs     01/12/23  0500 01/11/23  0051 01/10/23  0306   ALT 69 69 79*   * 212* 192*   TBILI 0.5 0.4 0.4   TP 7.1 6.0* 6.6   ALB 3.3* 3.0* 2.9*   GLOB 3.8 3.0 3.7       Recent Labs     01/12/23  0500 01/11/23  1814   INR  --  1.0   PTP  --  10.8   APTT 27.9  --         No results for input(s): FE, TIBC, PSAT, FERR in the last 72 hours. Lab Results   Component Value Date/Time    Folate 10.5 07/03/2018 09:24 AM        No results for input(s): PH, PCO2, PO2 in the last 72 hours. No results for input(s): CPK, CKNDX, TROIQ in the last 72 hours.     No lab exists for component: CPKMB    Lab Results   Component Value Date/Time    Cholesterol, total 170 01/12/2023 05:00 AM    HDL Cholesterol 40 01/12/2023 05:00 AM    LDL, calculated 87 01/12/2023 05:00 AM    Triglyceride 215 (H) 01/12/2023 05:00 AM    CHOL/HDL Ratio 4.3 01/12/2023 05:00 AM     Lab Results   Component Value Date/Time    Glucose (POC) 239 (H) 01/12/2023 09:02 PM    Glucose (POC) 329 (H) 01/12/2023 04:40 PM    Glucose (POC) 253 (H) 01/12/2023 11:28 AM    Glucose (POC) 114 01/12/2023 06:35 AM    Glucose (POC) 241 (H) 01/11/2023 09:26 PM     Lab Results   Component Value Date/Time    Color YELLOW/STRAW 01/01/2023 09:13 AM    Appearance CLEAR 01/01/2023 09:13 AM    Specific gravity 1.013 01/01/2023 09:13 AM    Specific gravity 1.020 05/03/2014 08:18 AM    pH (UA) 5.5 01/01/2023 09:13 AM    Protein 30 (A) 01/01/2023 09:13 AM    Glucose Negative 01/01/2023 09:13 AM    Ketone Negative 01/01/2023 09:13 AM    Bilirubin Negative 01/01/2023 09:13 AM    Urobilinogen 1.0 01/01/2023 09:13 AM    Nitrites Negative 01/01/2023 09:13 AM    Leukocyte Esterase Negative 01/01/2023 09:13 AM    Epithelial cells FEW 01/01/2023 09:13 AM    Bacteria Negative 01/01/2023 09:13 AM    WBC 0-4 01/01/2023 09:13 AM    RBC 0-5 01/01/2023 09:13 AM         Medications Reviewed:     Current Facility-Administered Medications   Medication Dose Route Frequency    milrinone (PRIMACOR) 20 MG/100 ML D5W infusion  0.25 mcg/kg/min IntraVENous CONTINUOUS    [START ON 1/13/2023] finasteride (PROSCAR) tablet 5 mg  5 mg Oral DAILY    insulin glargine (LANTUS) injection 6 Units  6 Units SubCUTAneous QHS    insulin lispro (HUMALOG) injection 4 Units  4 Units SubCUTAneous TID WITH MEALS    insulin lispro (HUMALOG) injection   SubCUTAneous AC&HS    dextrose 10 % infusion 0-250 mL  0-250 mL IntraVENous PRN    spironolactone (ALDACTONE) tablet 12.5 mg  12.5 mg Oral DAILY    melatonin tablet 3 mg  3 mg Oral QHS PRN    empagliflozin (JARDIANCE) tablet 10 mg  10 mg Oral DAILY    dextrose 10 % infusion 0-250 mL  0-250 mL IntraVENous PRN    albuterol-ipratropium (DUO-NEB) 2.5 MG-0.5 MG/3 ML  3 mL Nebulization Q6H PRN    digoxin (LANOXIN) tablet 0.125 mg  0.125 mg Oral DAILY    mirtazapine (REMERON) tablet 7.5 mg  7.5 mg Oral QHS    [Held by provider] clopidogreL (PLAVIX) tablet 75 mg  75 mg Oral DAILY    [Held by provider] potassium chloride SR (KLOR-CON 10) tablet 40 mEq  40 mEq Oral BID    pantoprazole (PROTONIX) tablet 40 mg  40 mg Oral ACB    heparin (porcine) injection 5,000 Units  5,000 Units SubCUTAneous Q12H    QUEtiapine (SEROquel) tablet 50 mg  50 mg Oral QHS    lidocaine 4 % patch 1 Patch  1 Patch TransDERmal Q24H    balsam peru-castor oiL (VENELEX) ointment   Topical BID    alteplase (CATHFLO) 1 mg in sterile water (preservative free) 1 mL injection  1 mg InterCATHeter PRN    bacitracin 500 unit/gram packet 1 Packet  1 Packet Topical PRN    glucose chewable tablet 16 g  4 Tablet Oral PRN    glucagon (GLUCAGEN) injection 1 mg  1 mg IntraMUSCular PRN    senna-docusate (PERICOLACE) 8.6-50 mg per tablet 1 Tablet  1 Tablet Oral BID PRN    bisacodyL (DULCOLAX) suppository 10 mg  10 mg Rectal QHS PRN    sodium chloride (NS) flush 5-40 mL  5-40 mL IntraVENous Q8H    sodium chloride (NS) flush 5-40 mL  5-40 mL IntraVENous PRN    acetaminophen (TYLENOL) tablet 650 mg  650 mg Oral Q6H PRN    Or    acetaminophen (TYLENOL) suppository 650 mg  650 mg Rectal Q6H PRN    polyethylene glycol (MIRALAX) packet 17 g  17 g Oral DAILY PRN    ondansetron (ZOFRAN ODT) tablet 4 mg  4 mg Oral Q8H PRN    Or    ondansetron (ZOFRAN) injection 4 mg  4 mg IntraVENous Q6H PRN    aspirin delayed-release tablet 81 mg  81 mg Oral DAILY    ipratropium (ATROVENT) 21 mcg (0.03 %) nasal spray 2 Spray  2 Spray Both Nostrils Q12H    tamsulosin (FLOMAX) capsule 0.4 mg  0.4 mg Oral DAILY    sodium chloride (NS) flush 5-40 mL  5-40 mL IntraVENous PRN     ______________________________________________________________________  EXPECTED LENGTH OF STAY: 5d 0h  ACTUAL LENGTH OF STAY:          21                 Mac Rojas MD

## 2023-01-13 NOTE — PROGRESS NOTES
Problem: Mobility Impaired (Adult and Pediatric)  Goal: *Therapy Goal (Edit Goal, Insert Text)  Description: FUNCTIONAL STATUS PRIOR TO ADMISSION: Patient was independent and active without use of DME. Patient is currently driving. Patient is independent with ADLs and IADLs. HOME SUPPORT PRIOR TO ADMISSION: The patient lived with his wife, but did not require assist.    Physical Therapy Goals  Continued 1/4/2023; remain appropriate 1/11/23  1. Patient will move from supine to sit and sit to supine, scoot up and down, and roll side to side in bed with modified independence within 7 day(s). 2.  Patient will transfer from bed to chair and chair to bed with modified independence using the least restrictive device within 7 day(s). 3.  Patient will perform sit to stand with modified independence within 7 day(s). 4.  Patient will ambulate with modified independence for 150 feet with the least restrictive device within 7 day(s). 5.  Patient will ascend/descend 13 stairs with single handrail(s) with modified independence within 7 day(s). Initiated 12/24/2022  1. Patient will move from supine to sit and sit to supine, scoot up and down, and roll side to side in bed with modified independence within 7 day(s). 2.  Patient will transfer from bed to chair and chair to bed with modified independence using the least restrictive device within 7 day(s). 3.  Patient will perform sit to stand with modified independence within 7 day(s). 4.  Patient will ambulate with modified independence for 150 feet with the least restrictive device within 7 day(s). 5.  Patient will ascend/descend 13 stairs with single handrail(s) with modified independence within 7 day(s).     Outcome: Progressing Towards Goal       PHYSICAL THERAPY TREATMENT  Patient: Anival Trimble (76 y.o. male)  Date: 1/13/2023  Diagnosis: Sepsis ([facility] [A41.9] <principal problem not specified>  Procedure(s) (LRB):  RIGHT HEART CATH (N/A) 4 Days Post-Op  Precautions: Fall  Chart, physical therapy assessment, plan of care and goals were reviewed. ASSESSMENT  Patient continues with skilled PT services and is progressing towards goals. Pt continues to progress activity tolerance with therapy. Pt was able to increase gait distance and perform steps to access second floor of home. Pt's rollator was delivered to room. Ambulated a short distance to check for fit. Pt was educated on how to turn and sit on rollator if needed. Pt could benefit from HHPT to continue to progress strength and activity tolerance. .     Current Level of Function Impacting Discharge (mobility/balance): CGA. supervision     Other factors to consider for discharge:          PLAN :  Patient continues to benefit from skilled intervention to address the above impairments. Continue treatment per established plan of care. to address goals. Recommendation for discharge: (in order for the patient to meet his/her long term goals)  Physical therapy at least 2 days/week in the home AND ensure assist and/or supervision for safety with mobility as needed    This discharge recommendation:  Has not yet been discussed the attending provider and/or case management    IF patient discharges home will need the following DME: rollator- delivered        SUBJECTIVE:   Patient stated I'm good.     OBJECTIVE DATA SUMMARY:   Critical Behavior:  Neurologic State: Alert  Orientation Level: Oriented X4  Cognition: Appropriate decision making, Follows commands  Safety/Judgement: Awareness of environment, Fall prevention, Home safety  Functional Mobility Training:  Bed Mobility:                    Transfers:  Sit to Stand: Supervision  Stand to Sit: Supervision                             Balance:  Sitting: Intact  Standing: Impaired  Standing - Static: Good  Standing - Dynamic : Constant support;Good  Ambulation/Gait Training:  Distance (ft): 450 Feet (ft)  Assistive Device: Gait belt;Walker, rollator  Ambulation - Level of Assistance: Contact guard assistance;Stand-by assistance        Gait Abnormalities: Decreased step clearance              Speed/Bette: Slow  Step Length: Right shortened;Left shortened                    Stairs:  Number of Stairs Trained: 15 (5 steps x3)  Stairs - Level of Assistance: Contact guard assistance;Stand-by assistance   Rail Use: Left     Therapeutic Exercises:     Pain Rating:  none    Activity Tolerance:   Post session pt reported no SOB, fatigue, or lightheaded     After treatment patient left in no apparent distress:   Sitting in chair, Call bell within reach, Bed / chair alarm activated, and Caregiver / family present    COMMUNICATION/COLLABORATION:   The patients plan of care was discussed with: Physical therapist, Occupational therapist, and Registered nurse.      Jazzy Uriarte PTA   Time Calculation: 26 mins

## 2023-01-13 NOTE — PROGRESS NOTES
Problem: Self Care Deficits Care Plan (Adult)  Goal: *Acute Goals and Plan of Care (Insert Text)  Description: FUNCTIONAL STATUS PRIOR TO ADMISSION: Patient was independent and active without use of DME. 2nd readmission in past month. Was completing ADLs and IADls without difficulty, went home on RA. Reports having difficulty doing stairs 2/2 increased SOB resulting in readmission. HOME SUPPORT: The patient lived with spouse but did not require assist.    Occupational Therapy Goals  Initiated 12/24/2022; continue goals 12/29, upgraded as appropriate 1/11/2022  1. Patient will perform upper body dressing with supervision/set-up within 7 day(s). (Upgraded to independence 1/11)  2. Patient will perform lower body dressing with supervision/set-up within 7 day(s). (Upgraded to independence 1/11)  3. Patient will perform bathing with supervision/set-up within 7 day(s). (Continued 1/11)  4. Patient will perform toilet transfers with supervision/set-up within 7 day(s). (Continued 1/11)  5. Patient will perform all aspects of toileting with supervision/set-up within 7 day(s). (Continued 1/11)  6. Patient will participate in upper extremity therapeutic exercise/activities with supervision/set-up for 5 minutes within 7 day(s). (Continued 1/11)   7. Patient will utilize energy conservation techniques during functional activities with verbal cues within 7 day(s). (Continued 1/11)  Outcome: Progressing Towards Goal   OCCUPATIONAL THERAPY TREATMENT  Patient: Mary Alfonso (76 y.o. male)  Date: 1/13/2023  Diagnosis: Sepsis ([facility] [A41.9] <principal problem not specified>  Procedure(s) (LRB):  RIGHT HEART CATH (N/A) 4 Days Post-Op  Precautions: Fall  Chart, occupational therapy assessment, plan of care, and goals were reviewed. ASSESSMENT  Patient continues with skilled OT services and is progressing towards goals. Patient received OOB in chair, amenable to session.  Completed functional mobility using rollator with min verbal cues for correct braking during transfers. Patient's BP remains soft, however with activity improves, patient asymptomatic throughout. Educated on energy conservation techniques to maximize ADL independence at home. Discussed length from bathroom to bedroom/living room and anticipate patient will have no problems. Patient left seated in chair, all needs in reach, NAD. Current Level of Function Impacting Discharge (ADLs): supervision ADL/mobility    Other factors to consider for discharge: below baseline, LVAD workup         PLAN :  Patient continues to benefit from skilled intervention to address the above impairments. Continue treatment per established plan of care to address goals. Recommend with staff: OOB 3x daily for meals, functional mobility to bathroom    Recommend next OT session: OOB ADL, generalized endurance training    Recommendation for discharge: (in order for the patient to meet his/her long term goals)  Occupational therapy at least 2 days/week in the home     This discharge recommendation:  Has been made in collaboration with the attending provider and/or case management    IF patient discharges home will need the following DME: none       SUBJECTIVE:   Patient stated I feel good about going home.     OBJECTIVE DATA SUMMARY:   Cognitive/Behavioral Status:  Neurologic State: Alert  Orientation Level: Oriented X4  Cognition: Appropriate decision making; Follows commands  Perception: Appears intact  Perseveration: No perseveration noted  Safety/Judgement: Awareness of environment; Fall prevention;Home safety    Functional Mobility and Transfers for ADLs:      Transfers:  Sit to Stand: Supervision          Balance:  Sitting: Intact  Standing: Impaired  Standing - Static: Good  Standing - Dynamic : Constant support;Good    ADL Intervention:       Grooming  Grooming Assistance: Supervision  Position Performed: Standing  Washing Face: Supervision                        Lower Body Dressing Assistance  Socks: Supervision  Leg Crossed Method Used: Yes  Position Performed: Seated in chair  Cues: Verbal cues provided         Cognitive Retraining  Safety/Judgement: Awareness of environment; Fall prevention;Home safety      Pain:  None reported    Activity Tolerance:   Good and requires rest breaks    After treatment patient left in no apparent distress:   Sitting in chair, Call bell within reach, and Bed / chair alarm activated    COMMUNICATION/COLLABORATION:   The patients plan of care was discussed with: Physical therapist and Registered nurse.      Ezio Muhammad, OT  Time Calculation: 29 mins

## 2023-01-13 NOTE — PROGRESS NOTES
Palliative Medicine Consult  Zeferino: [phone]    Patient Name: Raghu Shah  YOB: 1950    Date of Initial Consult: 1/12/2023  Reason for Consult: LVAD work-up  Requesting Provider: Lenore Brooks NP  Primary Care Physician: Arthurine Kocher, MD     SUMMARY:   Raghu Shah is a 71 y.o. who was admitted on 12/22/2022 from home with a diagnosis of Sepsis, acute hypoxic respiratory failure 2/2 combination of decompensated congestive heart failure and probable pneumonia as well as TANNER 2/2  bilateral outlet obstruction resulting in urinary retention and bilateral hydronephrosis. Mr. Radha Reid had just been discharged from 5 day  hospitalization on 12/16/2022 for CHF exacerbation and before that he was hospitalized  12/5-12/8/22 for NSTEMI and underwent cath. PMH: Cardiomyopathy, CHF, CAD, s/p CABG, NSTEMI x2 stents, deafness (+hearing aide, hears best in left ear), PAD, HTN, HLD, DM 2, CKD. Course of Hospitalization has been prolonged and complicated, Cardiogenic shock,  EF 25 %, requiring dobutamine, work up for LVAD candidacy under way. Renal function improved. Current medical issues leading to Palliative Medicine involvement include: LVAD candidacy work up. PALLIATIVE DIAGNOSES:   Palliative care encounter  End-stage heart failure  LVAD work-up  Weakness, generalized   advance care planning discussion  DNR discussion  Goals of care discussion         PLAN:   Prior to visit completed chart review for updated information. I spoke with Priyanka Samson PT re therapy evaluation today, she reported Mr. Kendall did well, vitals stable on room air, he was not SOB and noted to be making progress. Also had opportunity to speak with Lenore Brooks NP prior to visit. Briefly saw Mr. Kendall before extended family arrived, he reported feeling ok, denied complaints, denied SOB, denied dizziness, denied chest pain or any type of pain. Met with Mrs. Kendall and her sister, who came in from the [place],  with  And Mrs. Teague son and daughter Jorge Canela and Bibi Metz via speaker phone. After introductions, provided brief medical update, that overall he seems to be heading in the right direction at this time. Family shared a little about Mr. Teague prolonged and complicated hospitalization, that it was a rough road in the beginning, that medical team and family did not think he was going to survive, but then he slowly turned the corner and now he is being considered for LVAD to prolong his live for years to come. Mrs. Kendall tells me that its all still  overwhelming. Talked about recovery time, at minimum, weeks but could be 6 months or more, but goal is to return to a relatively normal life. Support System: MrJustino And Mrs. Kendall have 2 adult children, their Daughter Bibi Metz lives in [place] [address] and their son Jorge Canela lives in [place]. They are both very involved and in regular communication with their parents, actively reviewing mychart etc, but in terms of Mr. Kendall requiring caregiver support in the weeks or months s/p surgery, the burden would primarily be on Mrs. Kendall. Though Mrs. Kendall wants to lear how to assist with LVAD care, she stresses the importance of Mr. Kendall learning to manage the LVAD as well, being independent. Mrs. Kendall inquired about counseling services. I will reach out to Advanced Heart Failure  Monday. GOC Discussion- We also  discussed potential discharge option s/p LVAD surgery, including potentially SAH if appropriate and insurance covers. Family is very interested in Mr. Kendall going to [facility] after LVAD. DNR Discussion- Explained expectation for Full Code Status if pursuing LVAD, but in the event he should develop another advanced co-morbidity, then at that time DNR would be acceptable. Son stated that they elected DNR earlier in hospitalization when it looked like he was not going to survive.  However, now that pursuing LVAD he is going to talk with his dad in simple terms about everything, see where he is and what he wants to do. Please contact me via [employer] with any questions or concerns. Thank you for including the palliative team, we appreciate the opportunity to be of service to Mr. Kendall and his family. Initial consult note routed to primary continuity provider and/or primary health care team members  Communicated plan of care with: Palliative IDT, [facility] Team     GOALS OF CARE / TREATMENT PREFERENCES:     GOALS OF CARE: LVAD       TREATMENT PREFERENCES:   Code Status: DNR    Patient and family's personal goals include: LVAD    Important upcoming milestones or family events: Unknown    The patient identifies the following as important for living well: Pending      Advance Care Planning:  [x] [facility] Interdisciplinary Team has updated the ACP Navigator with Health Care Decision Maker and Patient Capacity      Primary Decision Maker: Marysol Marshall - [phone]    Secondary Decision Maker: Earlene Vallejo Lefty Martinez - [phone]  Advance Care Planning 12/22/2022   Patient's Healthcare Decision Maker is: Legal Next of Saida 70   Primary Decision Maker Name -   Primary Decision Maker Phone Number -   Primary Decision Maker Relationship to Patient -   Confirm Advance Directive None   Patient Would Like to Complete Advance Directive No   Does the patient have other document types -               Other:    As far as possible, the palliative care team has discussed with patient / health care proxy about goals of care / treatment preferences for patient.      HISTORY:     History obtained from: medical record/patient    CHIEF COMPLAINT: Denied     HPI/SUBJECTIVE:    The patient is:   [x] Verbal and participatory  [] Non-participatory due to:   He denied pain, denied feeling short of breath, stated that he feels ok      1/13- did well w/ PT, vital stable, no sob, no hypotension    Clinical Pain Assessment (nonverbal scale for severity on nonverbal patients):              Duration: for how long has pt been experiencing pain (e.g., 2 days, 1 month, years)  Frequency: how often pain is an issue (e.g., several times per day, once every few days, constant)     FUNCTIONAL ASSESSMENT:     Palliative Performance Scale (PPS):          PSYCHOSOCIAL/SPIRITUAL SCREENING:     Palliative IDT has assessed this patient for cultural preferences / practices and a referral made as appropriate to needs (Cultural Services, Patient Advocacy, Ethics, etc.)    Any spiritual / Methodist concerns:  [] Yes /  [x] No   If \"Yes\" to discuss with pastoral care during IDT     Does caregiver feel burdened by caring for their loved one:   [x] Yes /  [] No /  [] No Caregiver Present/Available [] No Caregiver [] Pt Lives at Facility  If \"Yes\" to discuss with social work during IDT    Anticipatory grief assessment:   [x] Normal  / [] Maladaptive     If \"Maladaptive\" to discuss with social work during IDT    ESAS Anxiety:      ESAS Depression:          REVIEW OF SYSTEMS:     Positive and pertinent negative findings in ROS are noted above in HPI. The following systems were [x] reviewed / [] unable to be reviewed as noted in HPI  Other findings are noted below. Systems: constitutional, ears/nose/mouth/throat, respiratory, gastrointestinal, genitourinary, musculoskeletal, integumentary, neurologic, psychiatric, endocrine. Positive findings noted below. Modified ESAS Completed by: provider                                   Stool Occurrence(s): 0        PHYSICAL EXAM:     From RN flowsheet:  Wt Readings from Last 3 Encounters:   01/13/23 114 lb 6.7 oz (51.9 kg)   12/19/22 129 lb (58.5 kg)   12/16/22 126 lb 8.7 oz (57.4 kg)     Blood pressure (!) 121/59, pulse 100, temperature 97.9 °F (36.6 °C), resp. rate 17, height 5' 9\" (1.753 m), weight 114 lb 6.7 oz (51.9 kg), SpO2 97 %.     Pain Scale 1: Numeric (0 - 10)  Pain Intensity 1: 0  Pain Onset 1: post line insertion  Pain Location 1: Neck  Pain Orientation 1: Right  Pain Description 1: Aching  Pain Intervention(s) 1: Medication (see MAR)  Last bowel movement, if known:     Constitutional: Appears older than stated age, thinner,  Eyes: pupils equal, anicteric  ENMT: no nasal discharge, moist mucous membranes  Cardiovascular: regular rhythm, distal pulses intact  Respiratory: breathing not labored, symmetric  Gastrointestinal: soft non-tender, +bowel sounds  Musculoskeletal: no deformity, no tenderness to palpation  Skin: warm, dry  Neurologic: following commands, moving all extremities  Psychiatric: full affect, no hallucinations  Other:       HISTORY:     Active Problems:    Sepsis ([facility] [address] (99/93/1336)      Systolic CHF, acute on chronic ([address] (12/29/2022)      Receiving inotropic medication (12/29/2022)    Past Medical History:   Diagnosis Date    CAD (coronary artery disease) 11/10/2016    NSTEMI & 2 stents    Deafness 10/28/2012    DM (diabetes mellitus) (HCC)     Elevated cholesterol     Hypertension     NSTEMI (non-ST elevated myocardial infarction) ([facility] [address] 11/10/2016      Past Surgical History:   Procedure Laterality Date    COLONOSCOPY N/A 6/28/2018    COLONOSCOPY performed by Radha Smalls MD at [facility] ENDOSCOPY    HX APPENDECTOMY      [id number]  11/11/2016    2 stents      Family History   Problem Relation Age of Onset    Heart Disease Father     Heart Attack Father     Hypertension Mother     Elevated Lipids Brother     Elevated Lipids Brother     No Known Problems Sister     Elevated Lipids Brother     No Known Problems Son     No Known Problems Daughter     Anesth Problems Neg Hx       History reviewed, no pertinent family history. Social History     Tobacco Use    Smoking status: Never     Passive exposure: Never    Smokeless tobacco: Never   Substance Use Topics    Alcohol use:  Yes     Alcohol/week: 2.0 standard drinks     Types: 1 Cans of beer, 1 Shots of liquor per week     Comment: rarely     No Known Allergies   Current Facility-Administered Medications   Medication Dose Route Frequency    finasteride (PROSCAR) tablet 5 mg  5 mg Oral DAILY WITH DINNER    insulin lispro (HUMALOG) injection 6 Units  6 Units SubCUTAneous TID WITH MEALS    ivabradine (CORLANOR) tablet 2.5 mg  2.5 mg Oral BID WITH MEALS    milrinone (PRIMACOR) 20 MG/100 ML D5W infusion  0.25 mcg/kg/min IntraVENous CONTINUOUS    insulin glargine (LANTUS) injection 6 Units  6 Units SubCUTAneous QHS    insulin lispro (HUMALOG) injection   SubCUTAneous AC&HS    dextrose 10 % infusion 0-250 mL  0-250 mL IntraVENous PRN    spironolactone (ALDACTONE) tablet 12.5 mg  12.5 mg Oral DAILY    melatonin tablet 3 mg  3 mg Oral QHS PRN    empagliflozin (JARDIANCE) tablet 10 mg  10 mg Oral DAILY    dextrose 10 % infusion 0-250 mL  0-250 mL IntraVENous PRN    albuterol-ipratropium (DUO-NEB) 2.5 MG-0.5 MG/3 ML  3 mL Nebulization Q6H PRN    digoxin (LANOXIN) tablet 0.125 mg  0.125 mg Oral DAILY    mirtazapine (REMERON) tablet 7.5 mg  7.5 mg Oral QHS    [Held by provider] potassium chloride SR (KLOR-CON 10) tablet 40 mEq  40 mEq Oral BID    pantoprazole (PROTONIX) tablet 40 mg  40 mg Oral ACB    heparin (porcine) injection 5,000 Units  5,000 Units SubCUTAneous Q12H    QUEtiapine (SEROquel) tablet 50 mg  50 mg Oral QHS    lidocaine 4 % patch 1 Patch  1 Patch TransDERmal Q24H    balsam peru-castor oiL (VENELEX) ointment   Topical BID    alteplase (CATHFLO) 1 mg in sterile water (preservative free) 1 mL injection  1 mg InterCATHeter PRN    bacitracin 500 unit/gram packet 1 Packet  1 Packet Topical PRN    glucose chewable tablet 16 g  4 Tablet Oral PRN    glucagon (GLUCAGEN) injection 1 mg  1 mg IntraMUSCular PRN    senna-docusate (PERICOLACE) 8.6-50 mg per tablet 1 Tablet  1 Tablet Oral BID PRN    bisacodyL (DULCOLAX) suppository 10 mg  10 mg Rectal QHS PRN    sodium chloride (NS) flush 5-40 mL  5-40 mL IntraVENous Q8H    sodium chloride (NS) flush 5-40 mL  5-40 mL IntraVENous PRN    acetaminophen (TYLENOL) tablet 650 mg  650 mg Oral Q6H PRN    Or    acetaminophen (TYLENOL) suppository 650 mg  650 mg Rectal Q6H PRN    polyethylene glycol (MIRALAX) packet 17 g  17 g Oral DAILY PRN    ondansetron (ZOFRAN ODT) tablet 4 mg  4 mg Oral Q8H PRN    Or    ondansetron (ZOFRAN) injection 4 mg  4 mg IntraVENous Q6H PRN    aspirin delayed-release tablet 81 mg  81 mg Oral DAILY    ipratropium (ATROVENT) 21 mcg (0.03 %) nasal spray 2 Spray  2 Spray Both Nostrils Q12H    tamsulosin (FLOMAX) capsule 0.4 mg  0.4 mg Oral DAILY    sodium chloride (NS) flush 5-40 mL  5-40 mL IntraVENous PRN          LAB AND IMAGING FINDINGS:     Lab Results   Component Value Date/Time    WBC 5.7 01/13/2023 05:26 AM    HGB 8.5 (L) 01/13/2023 05:26 AM    PLATELET 944 73/57/7856 05:26 AM     Lab Results   Component Value Date/Time    Sodium 135 (L) 01/13/2023 05:26 AM    Potassium 4.6 01/13/2023 05:26 AM    Chloride 106 01/13/2023 05:26 AM    CO2 23 01/13/2023 05:26 AM    BUN 29 (H) 01/13/2023 05:26 AM    Creatinine 1.28 01/13/2023 05:26 AM    Calcium 9.4 01/13/2023 05:26 AM    Magnesium 2.3 01/13/2023 05:26 AM    Phosphorus 3.4 01/13/2023 05:26 AM      Lab Results   Component Value Date/Time    Alk.  phosphatase 181 (H) 01/13/2023 05:26 AM    Protein, total 6.0 (L) 01/13/2023 05:26 AM    Albumin 3.0 (L) 01/13/2023 05:26 AM    Globulin 3.0 01/13/2023 05:26 AM     Lab Results   Component Value Date/Time    INR 1.0 01/11/2023 06:14 PM    Prothrombin time 10.8 01/11/2023 06:14 PM    aPTT 27.9 01/12/2023 05:00 AM      Lab Results   Component Value Date/Time    Iron 81 01/04/2023 02:31 AM    TIBC 277 01/04/2023 02:31 AM    Iron % saturation 29 01/04/2023 02:31 AM    Ferritin 463 (H) 01/04/2023 02:31 AM      No results found for: PH, PCO2, PO2  No components found for: [place]   Lab Results   Component Value Date/Time    CK 87 01/04/2023 02:31 AM    CK - MB 5.3 (H) 11/10/2016 03:44 PM                Total time: 65min  Counseling / coordination time, spent as noted above: 50minutes  > 50% counseling / coordination?: yes    Prolonged service was provided for  [x]30 min   []75 min in face to face time in the presence of the patient, spent as noted above. Time Start:   Time End:   Note: this can only be billed with 58761 (initial) or 75183 (follow up). If multiple start / stop times, list each separately.

## 2023-01-13 NOTE — PROGRESS NOTES
1930  Verbal bedside report given to Jonh Zaldivar RN oncoming nurse by Tobias Boyd. Jaz Moore RN off-going nurse. Report included current pt status and condition, recent results, hx of present illness, heart rate and rhythm (NSR/ST), and respiratory status. 1  Pt awake up in chair, family will be at beside later this morning. 0730  Verbal bedside report received from [address] off-going nurse by Tobias Boyd. LORI Dixon oncoming nurse. Report included current pt status and condition, recent results, hx of present illness, heart rate and rhythm (NSR/ST), and respiratory status. Pt asleep.

## 2023-01-13 NOTE — PROGRESS NOTES
Patient: Ana Torres MRN: [id number]  SSN: [id number]    YOB: 1950  Age: 71 y.o. Sex: male        ADMITTED: 2022 to Carolina Segovia MD by Vallorie Frankel, MD for Sepsis [facility]) [A41.9]  POD# 4 Days Post-Op Procedure(s):  RIGHT HEART CATH    Ana Torres is doing well. Donnelly draining yellow urine. He denies catheter irritation or complaints. Vitals: Temp (24hrs), Av.7 °F (37.1 °C), Min:98 °F (36.7 °C), Max:99.2 °F (37.3 °C)    Blood pressure (!) 96/46, pulse (!) 102, temperature 98 °F (36.7 °C), resp. rate 18, height 5' 9\" (1.753 m), weight 51.9 kg (114 lb 6.7 oz), SpO2 98 %. Intake and Output:   1901 -  0700  In: 1327.1 [P.O.:1190; I.V.:137.1]  Out: 4589 [UJRSJ:2570]   0701 -  1900  In: 304.9 [P.O.:250; I.V.:54.9]  Out: 600 [Urine:600]  BOB Output lats 24 hrs: No data found. BOB Output last 8 hrs: No data found. BM over last 24 hrs: Patient Vitals for the past 24 hrs:   Stool Occurrence(s)   23 1005 0     Physical Exam  General: NAD, pleasant  Respiratory: no distress, breathing easily, room air  Abdomen: soft, no distention; non-tender to palpation  : no CVA tenderness, Donnelly, clear yellow UA  Neuro: Appropriate, no focal neurological deficits  Skin: warm, dry  Extremities: moves all, full ROM    Labs:  CBC:   Lab Results   Component Value Date/Time    WBC 5.7 2023 05:26 AM    HCT 28.5 (L) 2023 05:26 AM    PLATELET 673  05:26 AM     BMP:   Lab Results   Component Value Date/Time    Glucose 106 (H) 2023 05:26 AM    Sodium 135 (L) 2023 05:26 AM    Potassium 4.6 2023 05:26 AM    Chloride 106 2023 05:26 AM    CO2 23 2023 05:26 AM    BUN 29 (H) 2023 05:26 AM    Creatinine 1.28 2023 05:26 AM    Calcium 9.4 2023 05:26 AM      Assessment/Plan:   1. Gross hematuria - resolved. Okay to resume AC  2. Retention - Maintain donnelly through discharge. Flomax and Finasteride on board.  Intermittent borderline hypotension noted, consider discontinuation of Flomax if remains an issue. OP follow up with [facility] arranged 1/26 with Dr Corky Hendrickson at 0225    Thank you for this consult. Please contact [facility] with any further questions/concerns.   Supervising MD, Dr. Mary Olvera  Signed By: Yifan Schroeder NP - January 13, 2023

## 2023-01-14 LAB
ALBUMIN SERPL-MCNC: 2.9 G/DL (ref 3.5–5)
ALBUMIN/GLOB SERPL: 0.9 (ref 1.1–2.2)
ALP SERPL-CCNC: 174 U/L (ref 45–117)
ALT SERPL-CCNC: 52 U/L (ref 12–78)
ANION GAP SERPL CALC-SCNC: 5 MMOL/L (ref 5–15)
AST SERPL-CCNC: 25 U/L (ref 15–37)
BILIRUB SERPL-MCNC: 0.4 MG/DL (ref 0.2–1)
BNP SERPL-MCNC: 2221 PG/ML
BUN SERPL-MCNC: 29 MG/DL (ref 6–20)
BUN/CREAT SERPL: 22 (ref 12–20)
CALCIUM SERPL-MCNC: 9.2 MG/DL (ref 8.5–10.1)
CHLORIDE SERPL-SCNC: 107 MMOL/L (ref 97–108)
CO2 SERPL-SCNC: 24 MMOL/L (ref 21–32)
CREAT SERPL-MCNC: 1.29 MG/DL (ref 0.7–1.3)
DIGOXIN SERPL-MCNC: 1 NG/ML (ref 0.9–2)
GLOBULIN SER CALC-MCNC: 3.1 G/DL (ref 2–4)
GLUCOSE BLD STRIP.AUTO-MCNC: 158 MG/DL (ref 65–117)
GLUCOSE BLD STRIP.AUTO-MCNC: 236 MG/DL (ref 65–117)
GLUCOSE BLD STRIP.AUTO-MCNC: 291 MG/DL (ref 65–117)
GLUCOSE SERPL-MCNC: 162 MG/DL (ref 65–100)
INTERPRETATION, 117893: NORMAL
MAGNESIUM SERPL-MCNC: 2.4 MG/DL (ref 1.6–2.4)
PHOSPHATE SERPL-MCNC: 3.7 MG/DL (ref 2.6–4.7)
POTASSIUM SERPL-SCNC: 4.5 MMOL/L (ref 3.5–5.1)
PROT C AG PPP IA-ACNC: 114 % (ref 60–150)
PROT S ACT/NOR PPP: 68 % (ref 63–140)
PROT SERPL-MCNC: 6 G/DL (ref 6.4–8.2)
SCREEN APTT: 39.1 SEC (ref 0–51.9)
SCREEN DRVVT: 35.3 SEC (ref 0–47)
SERVICE CMNT-IMP: ABNORMAL
SODIUM SERPL-SCNC: 136 MMOL/L (ref 136–145)

## 2023-01-14 PROCEDURE — 74011250637 HC RX REV CODE- 250/637: Performed by: NURSE PRACTITIONER

## 2023-01-14 PROCEDURE — 82962 GLUCOSE BLOOD TEST: CPT

## 2023-01-14 PROCEDURE — 74011250637 HC RX REV CODE- 250/637: Performed by: STUDENT IN AN ORGANIZED HEALTH CARE EDUCATION/TRAINING PROGRAM

## 2023-01-14 PROCEDURE — 80053 COMPREHEN METABOLIC PANEL: CPT

## 2023-01-14 PROCEDURE — 83880 ASSAY OF NATRIURETIC PEPTIDE: CPT

## 2023-01-14 PROCEDURE — 99233 SBSQ HOSP IP/OBS HIGH 50: CPT | Performed by: INTERNAL MEDICINE

## 2023-01-14 PROCEDURE — 36415 COLL VENOUS BLD VENIPUNCTURE: CPT

## 2023-01-14 PROCEDURE — 80162 ASSAY OF DIGOXIN TOTAL: CPT

## 2023-01-14 PROCEDURE — 74011250636 HC RX REV CODE- 250/636: Performed by: STUDENT IN AN ORGANIZED HEALTH CARE EDUCATION/TRAINING PROGRAM

## 2023-01-14 PROCEDURE — 74011636637 HC RX REV CODE- 636/637: Performed by: CLINICAL NURSE SPECIALIST

## 2023-01-14 PROCEDURE — 74011250637 HC RX REV CODE- 250/637: Performed by: INTERNAL MEDICINE

## 2023-01-14 PROCEDURE — 83735 ASSAY OF MAGNESIUM: CPT

## 2023-01-14 PROCEDURE — 74011250636 HC RX REV CODE- 250/636: Performed by: NURSE PRACTITIONER

## 2023-01-14 PROCEDURE — 74011250637 HC RX REV CODE- 250/637: Performed by: FAMILY MEDICINE

## 2023-01-14 PROCEDURE — 84100 ASSAY OF PHOSPHORUS: CPT

## 2023-01-14 PROCEDURE — 65660000001 HC RM ICU INTERMED STEPDOWN

## 2023-01-14 PROCEDURE — 74011000250 HC RX REV CODE- 250: Performed by: STUDENT IN AN ORGANIZED HEALTH CARE EDUCATION/TRAINING PROGRAM

## 2023-01-14 RX ADMIN — QUETIAPINE FUMARATE 50 MG: 25 TABLET ORAL at 21:52

## 2023-01-14 RX ADMIN — INSULIN GLARGINE 6 UNITS: 100 INJECTION, SOLUTION SUBCUTANEOUS at 21:48

## 2023-01-14 RX ADMIN — SODIUM CHLORIDE, PRESERVATIVE FREE 10 ML: 5 INJECTION INTRAVENOUS at 06:41

## 2023-01-14 RX ADMIN — Medication 6 UNITS: at 17:52

## 2023-01-14 RX ADMIN — SODIUM CHLORIDE, PRESERVATIVE FREE 10 ML: 5 INJECTION INTRAVENOUS at 21:49

## 2023-01-14 RX ADMIN — HEPARIN SODIUM 5000 UNITS: 5000 INJECTION INTRAVENOUS; SUBCUTANEOUS at 09:28

## 2023-01-14 RX ADMIN — Medication 2 UNITS: at 09:28

## 2023-01-14 RX ADMIN — IPRATROPIUM BROMIDE 2 SPRAY: 21 SPRAY, METERED NASAL at 09:29

## 2023-01-14 RX ADMIN — Medication: at 17:54

## 2023-01-14 RX ADMIN — MIRTAZAPINE 7.5 MG: 15 TABLET, FILM COATED ORAL at 21:52

## 2023-01-14 RX ADMIN — Medication: at 09:28

## 2023-01-14 RX ADMIN — DIGOXIN 0.12 MG: 125 TABLET ORAL at 09:29

## 2023-01-14 RX ADMIN — Medication 1 UNITS: at 21:48

## 2023-01-14 RX ADMIN — Medication 3 MG: at 21:52

## 2023-01-14 RX ADMIN — MILRINONE LACTATE IN DEXTROSE 0.25 MCG/KG/MIN: 200 INJECTION, SOLUTION INTRAVENOUS at 16:14

## 2023-01-14 RX ADMIN — ACETAMINOPHEN 650 MG: 325 TABLET ORAL at 21:52

## 2023-01-14 RX ADMIN — Medication 3 UNITS: at 12:18

## 2023-01-14 RX ADMIN — IPRATROPIUM BROMIDE 2 SPRAY: 21 SPRAY, METERED NASAL at 21:49

## 2023-01-14 RX ADMIN — Medication 6 UNITS: at 09:27

## 2023-01-14 RX ADMIN — EMPAGLIFLOZIN 10 MG: 10 TABLET, FILM COATED ORAL at 09:29

## 2023-01-14 RX ADMIN — PANTOPRAZOLE SODIUM 40 MG: 40 TABLET, DELAYED RELEASE ORAL at 06:41

## 2023-01-14 RX ADMIN — IVABRADINE 2.5 MG: 5 TABLET, FILM COATED ORAL at 16:14

## 2023-01-14 RX ADMIN — SODIUM CHLORIDE, PRESERVATIVE FREE 10 ML: 5 INJECTION INTRAVENOUS at 16:14

## 2023-01-14 RX ADMIN — TAMSULOSIN HYDROCHLORIDE 0.4 MG: 0.4 CAPSULE ORAL at 09:29

## 2023-01-14 RX ADMIN — ASPIRIN 81 MG: 81 TABLET, COATED ORAL at 09:29

## 2023-01-14 RX ADMIN — HEPARIN SODIUM 5000 UNITS: 5000 INJECTION INTRAVENOUS; SUBCUTANEOUS at 21:48

## 2023-01-14 RX ADMIN — IVABRADINE 2.5 MG: 5 TABLET, FILM COATED ORAL at 09:28

## 2023-01-14 RX ADMIN — FINASTERIDE 5 MG: 5 TABLET, FILM COATED ORAL at 16:14

## 2023-01-14 RX ADMIN — Medication 6 UNITS: at 12:18

## 2023-01-14 RX ADMIN — SPIRONOLACTONE 12.5 MG: 25 TABLET ORAL at 09:29

## 2023-01-14 NOTE — PROGRESS NOTES
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, LVEF 25-30%, stage D, NYHA class IIIB/IV; initiated on home milrinone gtt as bridge to completed LVAD workup  Most likely etiology of cardiomyopathy: CAD +/- TRISTAN +/- inappropriate sinus tach +/- undergoing workup   Undergoing LVAD workup, scopes on Wednesday  Tentative discharge home end of the week      RECOMMENDATIONS:  Continue milrinone 0.25mcg/kg/min- unable to uptitrate due to HR and BP  Cannot tolerate beta-blockers due to hypotension  Cannot tolerate ARB/ARNi due to hypotension  Continue spironolactone 12.5mg daily  Continue jardiance 10mg daily  Continue corlanor 2.5mg twice daily, goal HR 70-80s  Continue digoxin 0.125mcg, check digoxin level daily (today 1 goal 0.7-1.2)  Continue baby aspirin   Ok to stop Plavix per Dr. Shayy Tabor on GI Scopes on   Inpatient sleep medicine consult pending (high risk for TRISTAN)  Appreciate urology recs, will need OP follow up   DNR- will need to re address DNR status   Physical therapy/ambulate daily  Plan on DC with home inotropic support as bridge to LVAD  VAD evaluate in progress, will need to do dental, urology, sleep medicine as OP  Ambulate daily     All other care per primary team, hopefully home end of the week     IMPRESSION:  Acute on chronic combined systolic/diastolic  heart failure  Stage D, NYHA class IIIB/IV symptoms  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, LVEF 25-30% and 23% (by cMRI 1/3/23)  Cardiac MRI suggestive of ischemic cardiomyopathy  PYP equivocal  Coronary artery disease  CAD s/p CABG x 2: further disease best managed medically due to small vessel size   Peripheral arterial disease  Bilateral hydronephrosis s/p adnrzej  Cardiac risk factors:  HTN  HL  TRISTAN, STOP-BANG 4  DM2  CKD, stage 3  MOCA from 1/4/22 17/30, consistent with mild cognitive impairment      INTERVAL EVENTS:  Ambulating well, 3 laps  Improved appetite   Afebrile  -120s  HR 80-90s  I/O net neg 700cc, inaccurate  Cr 1.29  K 4.5  Alb 2.9  T Bili 0.4  NT 2221  Procalcitonin 0.28 yesterday      LIFE GOALS:  Lifestyle goals reviewed with the patient. Patient's personal goals include: TBD  Important upcoming milestones or family events: TBD  The patient identifies the following as important for living well: TBD     Patient assessed. High suspicion of sleep apnea due to the following reasons. Will refer for sleep evaluation. [x] Heart Failure  [] Hypertention  [x] Atrial Fibrillation  [] BMI > 30  [] History of stroke  [] Diabetes  [x] Heavy snoring  [] Witnessed apnea  [] Hypoxemia                    HPI:  71 y.o. male who was admitted via ED for worsening SOB, poor appetite, orthopnea and fatigue. Pmhx of CAD s/p CABG, PAD, DM 2, recent admission for HFmrEF earlier this month. Serial TTEs show progressive decline in EF since 3/2021 with the most recent EF at 25-30%. Recent LHC shows diffuse CAD and no interventions indicated. Patient had Fidencio Contes recently and there is some thought to myocarditis or other etiology causing worsening HF. The [facility] was consulted for further evaluation and management of HFrEF. CARDIAC IMAGING:  Echo 1/9/23   Left Ventricle: Severely reduced left ventricular systolic function with a visually estimated EF of 25 - 30%. Left ventricle size is normal. Mildly increased wall thickness. Echo 12/26/22    Left Ventricle: Severely reduced left ventricular systolic function with a visually estimated EF of 25 - 30%. Left ventricle size is normal. Normal wall thickness. There are regional wall motion abnormalities.  Grade II diastolic dysfunction with increased LAP. Right Ventricle: Moderately reduced systolic function. TAPSE is abnormal. TAPSE is 1.1 cm. Aortic Valve: Mild stenosis of the aortic valve. AV peak gradient is 13 mmHg. AV peak velocity is 1.8 m/s. Mitral Valve: Not well visualized. Moderate annular calcification at the posterior leaflet of the mitral valve. Mild to moderate regurgitation. Tricuspid Valve: Mildly elevated RVSP. Left Atrium: Left atrium is moderately dilated. 12/8/22    Left Ventricle: Moderately reduced left ventricular systolic function with a visually estimated EF of 35 - 40%. Severe hypokinesis of the following segments: mid anteroseptal, apical anterior, apical septal, apical inferior and apical lateral. Severe hypokinesis of the apex. Mitral Valve: Severely thickened leaflet, at the anterior and posterior leaflets. Severely calcified leaflet, at the anterior and posterior leaflets. Mild annular calcification of the mitral valve. Moderate regurgitation. Left Atrium: Left atrium is mildly dilated. Contrast used: Definity. limited study     EKG 12/22/22 ST, Biatria enlargement, marked ST abnormality     [facility] 12/6/22  1. Normal LVEDP  2. Severe native multivessel coronary artery disease  3. Patent LIMA to LAD and vein graft to distal RCA  4. Recurrent ISR in OM1 stent with now 60 to 70% restenosis  5. Recoil of left main and circumflex stent with now recurrent 40 to 50% stenosis. 6.  Progression of ostial left main disease now to about 60% stenosis  7. Progression of disease in jailed first marginal branch now with diffuse 90% stenosis  8.   High-grade stenosis in the mid to distal right potential femoral artery treated with 6 x 40 mm impact drug-coated balloon angioplasty to reduce the stenosis to less than 40%     NST        HEMODYNAMICS:  RHC 1/9/23  PA 20/9, RA 3, PCWP 8, CI 1.8     CPEST too ill   6MW 300 feet       PHYSICAL EXAM:  Visit Vitals  BP (!) 104/42   Pulse 84   Temp 97.7 °F (36.5 °C)   Resp 16   Ht 5' 9\" (1.753 m)   Wt 114 lb 10.2 oz (52 kg)   SpO2 96%   BMI 16.93 kg/m²     General: Patient is well developed, well-nourished in no acute distress  HEENT: Unremarkable   Neck: Supple. No evidence of thyroid enlargements or lymphadenopathy. JVD: Cannot be appreciated   Lungs: Breath sounds are equal and clear bilaterally. No wheezes, rhonchi, or rales. Heart: Regular rate and rhythm with normal S1 and S2. No murmurs, gallops or rubs. Abdomen: Soft, no mass or tenderness. No organomegaly or hernia. Bowel sounds present. Genitourinary and rectal: deferred  Extremities: No cyanosis, clubbing, or edema. Neurologic: No focal sensory or motor deficits are noted. Grossly intact. Psychiatric: Awake, alert an doriented x 3. Appropriate mood and affect. Skin: Warm, dry and well perfused. REVIEW OF SYSTEMS:  General: Denies fever. Ear, nose and throat: Denies difficulty hearing, sinus problems, nosebleeds  Cardiovascular: see above in the interval history  Respiratory: Denies cough, wheezing, sputum production, hemoptysis.   Gastrointestinal: Denies heartburn, constipation, diarrhea, abdominal pain, nausea, blood in stool  Kidney and bladder: Denies painful urination, frequent urination  Musculoskeletal: Denies joint pain, muscle weakness  Skin and hair: Denies change in existing skin lesions    PAST MEDICAL HISTORY:  Past Medical History:   Diagnosis Date    CAD (coronary artery disease) 11/10/2016    NSTEMI & 2 stents    Deafness 10/28/2012    DM (diabetes mellitus) ([facility] [address]     Elevated cholesterol     Hypertension     NSTEMI (non-ST elevated myocardial infarction) ([facility] [address] 11/10/2016       PAST SURGICAL HISTORY:  Past Surgical History:   Procedure Laterality Date    COLONOSCOPY N/A 6/28/2018    COLONOSCOPY performed by Yazmin Garcia MD at [address] [place] [address]  11/11/2016    2 stents       FAMILY HISTORY:  Family History Problem Relation Age of Onset    Heart Disease Father     Heart Attack Father     Hypertension Mother     Elevated Lipids Brother     Elevated Lipids Brother     No Known Problems Sister     Elevated Lipids Brother     No Known Problems Son     No Known Problems Daughter     Anesth Problems Neg Hx        SOCIAL HISTORY:  Social History     Socioeconomic History    Marital status:    Tobacco Use    Smoking status: Never     Passive exposure: Never    Smokeless tobacco: Never   Vaping Use    Vaping Use: Never used   Substance and Sexual Activity    Alcohol use: Yes     Alcohol/week: 2.0 standard drinks     Types: 1 Cans of beer, 1 Shots of liquor per week     Comment: rarely    Drug use: No    Sexual activity: Yes     Social Determinants of Health     Financial Resource Strain: Medium Risk    Difficulty of Paying Living Expenses: Somewhat hard   Food Insecurity: Food Insecurity Present    Worried About Running Out of Food in the Last Year: Never true    Ran Out of Food in the Last Year: Often true       LABORATORY RESULTS:     Labs Latest Ref Rng & Units 1/14/2023 1/13/2023 1/12/2023 1/11/2023 1/10/2023 1/9/2023 1/8/2023   WBC 4.1 - 11.1 K/uL - 5.7 - 6.6 - 9.0 -   RBC 4.10 - 5.70 M/uL - 3.55(L) - 3.65(L) - 3.66(L) -   Hemoglobin 12.1 - 17.0 g/dL - 8. 5(L) - 8. 5(L) - 8. 7(L) -   Hematocrit 36.6 - 50.3 % - 28. 5(L) - 29. 4(L) - 29. 2(L) -   MCV 80.0 - 99.0 FL - 80.3 - 80.5 - 79. 8(L) -   Platelets 170 - 187 K/uL - 264 - 276 - 254 -   Lymphocytes 12 - 49 % - - - - - - -   Monocytes 5 - 13 % - - - - - - -   Eosinophils 0 - 7 % - - - - - - -   Basophils 0 - 1 % - - - - - - -   Albumin 3.5 - 5.0 g/dL 2. 9(L) 3.0(L) 3. 3(L) 3.0(L) 2. 9(L) 2. 9(L) 3.0(L)   Calcium 8.5 - 10.1 MG/DL 9.2 9.4 9.7 9.2 9.3 9.4 9.1   Glucose 65 - 100 mg/dL 162(H) 106(H) 118(H) 146(H) 77 192(H) 135(H)   BUN 6 - 20 MG/DL 29(H) 29(H) 27(H) 27(H) 30(H) 36(H) 36(H)   Creatinine 0.70 - 1.30 MG/DL 1.29 1.28 1.29 1.20 1.14 1.27 1.26   Sodium 136 - 145 mmol/L 136 135(L) 135(L) 136 136 138 138   Potassium 3.5 - 5.1 mmol/L 4.5 4.6 4.5 4.6 4.3 4.6 4.8   TSH 0.36 - 3.74 uIU/mL - - - - - - -   PSA 0.01 - 4.0 ng/mL - - - - - - -   LDH 85 - 241 U/L - - 189 - - - -   Some recent data might be hidden     Lab Results   Component Value Date/Time    TSH 2.12 12/27/2022 02:36 PM    TSH 4.80 (H) 12/06/2022 03:53 AM    TSH 5.39 (H) 10/12/2022 09:10 AM    TSH 3.53 02/03/2022 11:47 AM    TSH 5.790 (H) 11/21/2019 04:45 PM    TSH 3.08 06/22/2018 01:53 PM    TSH 4.250 05/26/2015 09:43 AM       ALLERGY:  No Known Allergies     CURRENT MEDICATIONS:    Current Facility-Administered Medications:     finasteride (PROSCAR) tablet 5 mg, 5 mg, Oral, DAILY WITH DINNER, Devyn Salguero MD, 5 mg at 01/13/23 1711    insulin lispro (HUMALOG) injection 6 Units, 6 Units, SubCUTAneous, TID WITH MEALS, Cathy Sheldon CNS, 6 Units at 01/13/23 1712    ivabradine (CORLANOR) tablet 2.5 mg, 2.5 mg, Oral, BID WITH MEALS, Shaila, Lizette B, NP, 2.5 mg at 01/13/23 1711    milrinone (PRIMACOR) 20 MG/100 ML D5W infusion, 0.25 mcg/kg/min, IntraVENous, CONTINUOUS, Shaila, Lizette B, NP, Last Rate: 3.9 mL/hr at 01/13/23 1358, 0.25 mcg/kg/min at 01/13/23 1358    insulin glargine (LANTUS) injection 6 Units, 6 Units, SubCUTAneous, QHS, Cathy Sheldon CNS, 6 Units at 01/13/23 2126    insulin lispro (HUMALOG) injection, , SubCUTAneous, AC&HS, Cathy Sheldon CNS, 2 Units at 01/13/23 2126    dextrose 10 % infusion 0-250 mL, 0-250 mL, IntraVENous, PRN, Cathy Sheldon, CNS    spironolactone (ALDACTONE) tablet 12.5 mg, 12.5 mg, Oral, DAILY, Lizette Linton NP, 12.5 mg at 01/13/23 1000    melatonin tablet 3 mg, 3 mg, Oral, QHS PRN, Modesta Ron NP, 3 mg at 01/13/23 2121    empagliflozin (JARDIANCE) tablet 10 mg, 10 mg, Oral, DAILY, Malinda Mchugh MD, 10 mg at 01/13/23 1000    dextrose 10 % infusion 0-250 mL, 0-250 mL, IntraVENous, PRN, Cathy Sheldon, CNS    albuterol-ipratropium (DUO-NEB) 2.5 MG-0.5 MG/3 ML, 3 mL, Nebulization, Q6H PRN, Najma Rahman MD    digoxin (LANOXIN) tablet 0.125 mg, 0.125 mg, Oral, DAILY, Colleen Astorga NP, 0.125 mg at 01/12/23 0933    mirtazapine (REMERON) tablet 7.5 mg, 7.5 mg, Oral, QHS, Marianela Alejandro, Layton HARTLEY MD, 7.5 mg at 01/13/23 2121    [Held by provider] potassium chloride SR (KLOR-CON 10) tablet 40 mEq, 40 mEq, Oral, BID, Agnes MOLINA MD, 40 mEq at 01/03/23 0838    pantoprazole (PROTONIX) tablet 40 mg, 40 mg, Oral, ACB, Santhosh Dillon, DO, 40 mg at 01/14/23 0641    heparin (porcine) injection 5,000 Units, 5,000 Units, SubCUTAneous, Q12H, Santhosh Dillon, DO, 5,000 Units at 01/13/23 2126    QUEtiapine (SEROquel) tablet 50 mg, 50 mg, Oral, QHS, Aguilar, David G, DO, 50 mg at 01/13/23 2121    lidocaine 4 % patch 1 Patch, 1 Patch, TransDERmal, Q24H, Aminta Ward MD, 1 Patch at 01/11/23 1734    balsam peru-castor oiL (VENELEX) ointment, , Topical, BID, Adelfo Major MD, Given at 01/13/23 1714    alteplase (CATHFLO) 1 mg in sterile water (preservative free) 1 mL injection, 1 mg, InterCATHeter, PRN, Aminta Ward MD    bacitracin 500 unit/gram packet 1 Packet, 1 Packet, Topical, PRN, Aminta Ward MD    glucose chewable tablet 16 g, 4 Tablet, Oral, PRN, Agnes MOLINA MD    glucagon (GLUCAGEN) injection 1 mg, 1 mg, IntraMUSCular, PRN, Aminta Ward MD    senna-docusate (PERICOLACE) 8.6-50 mg per tablet 1 Tablet, 1 Tablet, Oral, BID PRN, Aminta Ward MD, 1 Tablet at 12/26/22 8850    bisacodyL (DULCOLAX) suppository 10 mg, 10 mg, Rectal, QHS PRN, Lindsay Lind, Walker MOLINA MD    sodium chloride (NS) flush 5-40 mL, 5-40 mL, IntraVENous, Q8H, Walker Aponte MD, 10 mL at 01/14/23 0641    sodium chloride (NS) flush 5-40 mL, 5-40 mL, IntraVENous, PRN, Aminta Ward MD, 10 mL at 12/28/22 0845    acetaminophen (TYLENOL) tablet 650 mg, 650 mg, Oral, Q6H PRN, 650 mg at 01/13/23 2121 **OR** acetaminophen (TYLENOL) suppository 650 mg, 650 mg, Rectal, Q6H PRN, Vidya MOLINA MD    polyethylene glycol (MIRALAX) packet 17 g, 17 g, Oral, DAILY PRN, Carlos Lemon MD, 17 g at 12/26/22 0649    ondansetron (ZOFRAN ODT) tablet 4 mg, 4 mg, Oral, Q8H PRN **OR** ondansetron (ZOFRAN) injection 4 mg, 4 mg, IntraVENous, Q6H PRN, Vidya MOLINA MD, 4 mg at 12/24/22 9114    aspirin delayed-release tablet 81 mg, 81 mg, Oral, DAILY, Ranjit Pearson MD, 81 mg at 01/13/23 1000    ipratropium (ATROVENT) 21 mcg (0.03 %) nasal spray 2 Spray, 2 Spray, Both Nostrils, Q12H, Ranjit Pearson MD, 2 Premier at 01/13/23 2127    tamsulosin (FLOMAX) capsule 0.4 mg, 0.4 mg, Oral, DAILY, Ana Pearson MD, 0.4 mg at 01/13/23 1000    sodium chloride (NS) flush 5-40 mL, 5-40 mL, IntraVENous, PRN, Carlos Lemon MD    PATIENT CARE TEAM:  Patient Care Team:  Moni Soria MD as PCP - General (Family Medicine)  Moni Soria MD as PCP - [facility]  Parag Fernandez MD (Cardiovascular Disease Physician)  Jm Baumann MD (Gastroenterology)  Mehul Rosales MD (Cardiothoracic Surgery)  Adriana Rhoades MD (Cardiovascular Disease Physician)  Tristan Samson MD (Nephrology)  Shaista Alas RN as Care Transitions Nurse  Claire Dodd MD (Pulmonary Disease)     Thank you for allowing me to participate in this patient's care.     Nas Crowder MD   [address] [place] [address], [address]  Phone: [phone]

## 2023-01-14 NOTE — PROGRESS NOTES
1930  Verbal bedside report given to DASHAWN More RN oncoming nurse by Noble Stone. Maci Eid RN off-going nurse. Report included current pt status and condition, recent results, hx of present illness, heart rate and rhythm (NSR), and respiratory status. [address] pt up to chair to eat. 0930  Pt awake, feeling fatigued this morning. Really would like to go home soon. 0730  Verbal bedside report received from Ollie Kumari RN off-going nurse by Noble Stone. LORI Dixon oncoming nurse. Report included current pt status and condition, recent results, hx of present illness, heart rate and rhythm (NSR), and respiratory status. Greeted pt and enquired about present needs and goals for the day.

## 2023-01-14 NOTE — PROGRESS NOTES
[address] [facility]  Hospitalist Group                                                                                          Hospitalist Progress Note  Lavern Dasilva MD  Answering service: [phone] from in house phone        Date of Service:  2023  NAME:  Sammie Schmitz  :  1950  MRN:  [id number]      Admission Summary:   Sammie Schmitz is a 71 y.o. male who presents with progressively worsening shortness of breath for past several days. It has gotten worse to the point that he can only ambulate a few steps due to severe dyspnea and near syncope. Patient also noted abdominal distention which is increasing. Patient with known history of cardiomyopathy and recently admitted for CHF exacerbation a week ago. On presentation to the ER, chest x-ray shows diffuse airspace disease atypical infection versus edema. Also patient was noted to have elevated troponin and BNP. Patient was further evaluated by CT abdomen and pelvis which shows massively distended bladder with some bilateral hydronephrosis, subsequently Vasquez was placed. Patient also with significant leukocytosis as well as tachycardic and elevated lactic acid level and subsequently code sepsis was called. Hospitalist service requested admit the patient for further evaluation management.      The patient denies any headache, blurry vision, sore throat, trouble swallowing, trouble with speech, chest pain, SOB, cough, fever, chills, N/V/D, abd pain, urinary symptoms, constipation, recent travels, sick contacts, focal or generalized neurological symptoms, falls, injuries, rashes, contact with COVID-19 diagnosed patients, hematemesis, melena, hemoptysis, hematuria, rashes, denies starting any new medications and denies any other concerns or problems besides as mentioned above       Interval history / Subjective:     Patient seen and examined , reviewed his vitals,labs,careplan  Plans for DC home with PICC and home IV inotropes  Hematuria resolved  urology rec noted     Assessment & Plan:     Acute on chronic systolic congestive heart failure   Cardiogenic shock due to above - off dbutamine on milrinone  CAD   S/p CABG   Ischemic Cardiomyopathy      Appreciate help from Heart failure team.   S/p RHC -RHC today with normal to low filling pressures but mildly reduced CI. PICC line placed for home milrinone     TANNER on CKD stage III   Improving - stable cr  Monitor daily weight , I/O   Adjusting diuretics     GERD -chronic   No change in current Rx. Continue PPI     Depression-stable  Hospital delirium  -resolved  Continue Seroquel as is QHS - at 50mg   Continue Remeron as was PTA      T2DM -on Insulin   Controlled Lauri diet   Insulin NPH Six units BID      BPH -with urinary retention  Continue Tamsulosin/proscar and donnelly on DC  Hematuria resolved urology recs noted with follow up appt     PAD   S/p Right SFA PTCA -followed by Dr. Skinny White   Normal MARIANELA     Body mass index is 17.39 kg/m². - severe protein lauri malnutrition         Code status: DNR  Prophylaxis: heparin   Care Plan discussed with: pt, RN   Anticipated Disposition: greater than 48 hours,   Being worked up and considered for possible [id number] Problems  Date Reviewed: 12/5/2022            Codes Class Noted POA    Systolic CHF, acute on chronic ([facility] [address] ICD-10-CM: I50.23  ICD-9-CM: 428.23, 428.0  12/29/2022 Yes        Receiving inotropic medication ICD-10-CM: Z79.899  ICD-9-CM: V49.89  12/29/2022 Unknown        Sepsis ([facility] [address] ICD-10-CM: A41.9  ICD-9-CM: 038.9, 995.91  12/22/2022 Unknown       Review of Systems:   A comprehensive review of systems was negative except for that written in the HPI. Vital Signs:    Last 24hrs VS reviewed since prior progress note.  Most recent are:  Visit Vitals  BP (!) 112/38 (BP 1 Location: Left upper arm, BP Patient Position: At rest)   Pulse 97   Temp 98 °F (36.7 °C)   Resp 18   Ht 5' 9\" (1.753 m)   Wt 52 kg (114 lb 10.2 oz) SpO2 96%   BMI 16.93 kg/m²     Patient Vitals for the past 24 hrs:   Temp Pulse Resp BP SpO2   01/14/23 1400 -- 97 -- -- --   01/14/23 1200 98 °F (36.7 °C) 97 18 (!) 112/38 96 %   01/14/23 1000 -- 95 -- -- --   01/14/23 0930 -- -- -- -- 95 %   01/14/23 0759 97.7 °F (36.5 °C) 84 16 (!) 104/42 96 %   01/14/23 0700 98 °F (36.7 °C) 84 16 (!) 121/57 97 %   01/14/23 0642 98 °F (36.7 °C) -- -- -- --   01/14/23 0600 -- 87 -- -- --   01/14/23 0456 97.6 °F (36.4 °C) 85 17 (!) 106/47 98 %   01/14/23 0400 -- 84 -- -- --   01/14/23 0200 -- 86 -- -- --   01/14/23 0051 98 °F (36.7 °C) 95 16 101/68 96 %   01/13/23 2200 -- 97 -- -- --   01/13/23 2000 -- 95 -- -- --   01/13/23 1754 -- 100 -- -- --   01/13/23 1718 97.9 °F (36.6 °C) (!) 103 17 (!) 121/59 97 %          Intake/Output Summary (Last 24 hours) at 1/14/2023 1606  Last data filed at 1/14/2023 1200  Gross per 24 hour   Intake 553 ml   Output 2350 ml   Net -1797 ml          Physical Examination:     I had a face to face encounter with this patient and independently examined them on 1/14/2023 as outlined below:          Constitutional:  No acute distress, , pleasant, frail elderly male   ENT:  Oral mucosa dry  EOMI    Resp:  CTA bilaterally. No wheezing/rhonchi/rales. No accessory muscle use. CV:  Regular rhythm, normal rate, no murmurs, gallops, rubs    GI:  Soft, non distended, non tender. Bs+    Musculoskeletal:  No edema, warm, 2+ pulses throughout    Neurologic:  Moves all extremities.   Awake and alert, CN II-XII reviewed            Data Review:    Review and/or order of clinical lab test  Review and/or order of tests in the radiology section of CPT  Review and/or order of tests in the medicine section of CPT      Labs:     Recent Labs     01/13/23  0526   WBC 5.7   HGB 8.5*   HCT 28.5*          Recent Labs     01/14/23  0543 01/13/23  0526 01/12/23  0500    135* 135*   K 4.5 4.6 4.5    106 106   CO2 24 23 24   BUN 29* 29* 27*   CREA 1.29 1.28 1.29 * 106* 118*   CA 9.2 9.4 9.7   MG 2.4 2.3 2.3   PHOS 3.7 3.4 3.5       Recent Labs     01/14/23  0543 01/13/23  0526 01/12/23  0500   ALT 52 55 69   * 181* 220*   TBILI 0.4 0.4 0.5   TP 6.0* 6.0* 7.1   ALB 2.9* 3.0* 3.3*   GLOB 3.1 3.0 3.8       Recent Labs     01/12/23  0500 01/11/23  1814   INR  --  1.0   PTP  --  10.8   APTT 27.9  --         No results for input(s): FE, TIBC, PSAT, FERR in the last 72 hours. Lab Results   Component Value Date/Time    Folate 10.5 07/03/2018 09:24 AM        No results for input(s): PH, PCO2, PO2 in the last 72 hours. No results for input(s): CPK, CKNDX, TROIQ in the last 72 hours.     No lab exists for component: CPKMB    Lab Results   Component Value Date/Time    Cholesterol, total 170 01/12/2023 05:00 AM    HDL Cholesterol 40 01/12/2023 05:00 AM    LDL, calculated 87 01/12/2023 05:00 AM    Triglyceride 215 (H) 01/12/2023 05:00 AM    CHOL/HDL Ratio 4.3 01/12/2023 05:00 AM     Lab Results   Component Value Date/Time    Glucose (POC) 291 (H) 01/14/2023 11:30 AM    Glucose (POC) 222 (H) 01/13/2023 09:26 PM    Glucose (POC) 159 (H) 01/13/2023 05:09 PM    Glucose (POC) 266 (H) 01/13/2023 11:09 AM    Glucose (POC) 128 (H) 01/13/2023 07:59 AM     Lab Results   Component Value Date/Time    Color YELLOW/STRAW 01/01/2023 09:13 AM    Appearance CLEAR 01/01/2023 09:13 AM    Specific gravity 1.013 01/01/2023 09:13 AM    Specific gravity 1.020 05/03/2014 08:18 AM    pH (UA) 5.5 01/01/2023 09:13 AM    Protein 30 (A) 01/01/2023 09:13 AM    Glucose Negative 01/01/2023 09:13 AM    Ketone Negative 01/01/2023 09:13 AM    Bilirubin Negative 01/01/2023 09:13 AM    Urobilinogen 1.0 01/01/2023 09:13 AM    Nitrites Negative 01/01/2023 09:13 AM    Leukocyte Esterase Negative 01/01/2023 09:13 AM    Epithelial cells FEW 01/01/2023 09:13 AM    Bacteria Negative 01/01/2023 09:13 AM    WBC 0-4 01/01/2023 09:13 AM    RBC 0-5 01/01/2023 09:13 AM         Medications Reviewed:     Current Facility-Administered Medications   Medication Dose Route Frequency    finasteride (PROSCAR) tablet 5 mg  5 mg Oral DAILY WITH DINNER    insulin lispro (HUMALOG) injection 6 Units  6 Units SubCUTAneous TID WITH MEALS    ivabradine (CORLANOR) tablet 2.5 mg  2.5 mg Oral BID WITH MEALS    milrinone (PRIMACOR) 20 MG/100 ML D5W infusion  0.25 mcg/kg/min IntraVENous CONTINUOUS    insulin glargine (LANTUS) injection 6 Units  6 Units SubCUTAneous QHS    insulin lispro (HUMALOG) injection   SubCUTAneous AC&HS    dextrose 10 % infusion 0-250 mL  0-250 mL IntraVENous PRN    spironolactone (ALDACTONE) tablet 12.5 mg  12.5 mg Oral DAILY    melatonin tablet 3 mg  3 mg Oral QHS PRN    empagliflozin (JARDIANCE) tablet 10 mg  10 mg Oral DAILY    dextrose 10 % infusion 0-250 mL  0-250 mL IntraVENous PRN    albuterol-ipratropium (DUO-NEB) 2.5 MG-0.5 MG/3 ML  3 mL Nebulization Q6H PRN    digoxin (LANOXIN) tablet 0.125 mg  0.125 mg Oral DAILY    mirtazapine (REMERON) tablet 7.5 mg  7.5 mg Oral QHS    [Held by provider] potassium chloride SR (KLOR-CON 10) tablet 40 mEq  40 mEq Oral BID    pantoprazole (PROTONIX) tablet 40 mg  40 mg Oral ACB    heparin (porcine) injection 5,000 Units  5,000 Units SubCUTAneous Q12H    QUEtiapine (SEROquel) tablet 50 mg  50 mg Oral QHS    lidocaine 4 % patch 1 Patch  1 Patch TransDERmal Q24H    balsam peru-castor oiL (VENELEX) ointment   Topical BID    alteplase (CATHFLO) 1 mg in sterile water (preservative free) 1 mL injection  1 mg InterCATHeter PRN    bacitracin 500 unit/gram packet 1 Packet  1 Packet Topical PRN    glucose chewable tablet 16 g  4 Tablet Oral PRN    glucagon (GLUCAGEN) injection 1 mg  1 mg IntraMUSCular PRN    senna-docusate (PERICOLACE) 8.6-50 mg per tablet 1 Tablet  1 Tablet Oral BID PRN    bisacodyL (DULCOLAX) suppository 10 mg  10 mg Rectal QHS PRN    sodium chloride (NS) flush 5-40 mL  5-40 mL IntraVENous Q8H    sodium chloride (NS) flush 5-40 mL  5-40 mL IntraVENous PRN acetaminophen (TYLENOL) tablet 650 mg  650 mg Oral Q6H PRN    Or    acetaminophen (TYLENOL) suppository 650 mg  650 mg Rectal Q6H PRN    polyethylene glycol (MIRALAX) packet 17 g  17 g Oral DAILY PRN    ondansetron (ZOFRAN ODT) tablet 4 mg  4 mg Oral Q8H PRN    Or    ondansetron (ZOFRAN) injection 4 mg  4 mg IntraVENous Q6H PRN    aspirin delayed-release tablet 81 mg  81 mg Oral DAILY    ipratropium (ATROVENT) 21 mcg (0.03 %) nasal spray 2 Spray  2 Spray Both Nostrils Q12H    tamsulosin (FLOMAX) capsule 0.4 mg  0.4 mg Oral DAILY    sodium chloride (NS) flush 5-40 mL  5-40 mL IntraVENous PRN     ______________________________________________________________________  EXPECTED LENGTH OF STAY: 5d 0h  ACTUAL LENGTH OF STAY:          23                 Alejandra Solorio MD

## 2023-01-14 NOTE — PROGRESS NOTES
[address] [facility] Adult  Hospitalist Group                                                                                          Hospitalist Progress Note  Reji Watkins MD  Answering service: [phone] from in house phone        Date of Service:  2023  NAME:  Dominic Welsh  :  1950  MRN:  [id number]      Admission Summary:   Dominic Welsh is a 71 y.o. male who presents with progressively worsening shortness of breath for past several days. It has gotten worse to the point that he can only ambulate a few steps due to severe dyspnea and near syncope. Patient also noted abdominal distention which is increasing. Patient with known history of cardiomyopathy and recently admitted for CHF exacerbation a week ago. On presentation to the ER, chest x-ray shows diffuse airspace disease atypical infection versus edema. Also patient was noted to have elevated troponin and BNP. Patient was further evaluated by CT abdomen and pelvis which shows massively distended bladder with some bilateral hydronephrosis, subsequently Vasquez was placed. Patient also with significant leukocytosis as well as tachycardic and elevated lactic acid level and subsequently code sepsis was called. Hospitalist service requested admit the patient for further evaluation management.      The patient denies any headache, blurry vision, sore throat, trouble swallowing, trouble with speech, chest pain, SOB, cough, fever, chills, N/V/D, abd pain, urinary symptoms, constipation, recent travels, sick contacts, focal or generalized neurological symptoms, falls, injuries, rashes, contact with COVID-19 diagnosed patients, hematemesis, melena, hemoptysis, hematuria, rashes, denies starting any new medications and denies any other concerns or problems besides as mentioned above       Interval history / Subjective:     Patient seen and examined , reviewed his vitals,labs,careplan  Plans for DC home with PICC and home IV inotropes  Hematuria today- urology consulted      Assessment & Plan:     Acute on chronic systolic congestive heart failure   Cardiogenic shock due to above - off dbutamine on milrinone  CAD   S/p CABG   Ischemic Cardiomyopathy      Appreciate help from Heart failure team.   S/p RHC -RHC today with normal to low filling pressures but mildly reduced CI. PICC line placed for home milrinone     TANNER on CKD stage III   Improving - stable cr  Monitor daily weight , I/O   Adjusting diuretics     GERD -chronic   No change in current Rx. Continue PPI     Depression-stable  Hospital delirium  -resolved  Continue Seroquel as is QHS - at 50mg   Continue Remeron as was PTA      T2DM -on Insulin   Controlled Lauri diet   Insulin NPH Six units BID      BPH -with urinary retention  Continue Tamsulosin and donnelly  Hematuria noted- urology reconsulted     PAD   S/p Right SFA PTCA -followed by Dr. Carla Gage   Normal MARIANELA     Body mass index is 17.39 kg/m². - severe protein lauri malnutrition         Code status: DNR  Prophylaxis: heparin   Care Plan discussed with: pt, RN   Anticipated Disposition: greater than 48 hours,   Being worked up and considered for possible [id number] [facility] [address] Problems  Date Reviewed: 12/5/2022            Codes Class Noted POA    Systolic CHF, acute on chronic ([facility] [address] ICD-10-CM: I50.23  ICD-9-CM: 428.23, 428.0  12/29/2022 Yes        Receiving inotropic medication ICD-10-CM: Z79.899  ICD-9-CM: V49.89  12/29/2022 Unknown        Sepsis ([facility] [address] ICD-10-CM: A41.9  ICD-9-CM: 038.9, 995.91  12/22/2022 Unknown       Review of Systems:   A comprehensive review of systems was negative except for that written in the HPI. Vital Signs:    Last 24hrs VS reviewed since prior progress note.  Most recent are:  Visit Vitals  BP (!) 121/59 (BP 1 Location: Left upper arm, BP Patient Position: At rest;Reclining)   Pulse 97   Temp 97.9 °F (36.6 °C)   Resp 17   Ht 5' 9\" (1.753 m)   Wt 51.9 kg (114 lb 6.7 oz)   SpO2 97%   BMI 16.90 kg/m² Patient Vitals for the past 24 hrs:   Temp Pulse Resp BP SpO2   01/13/23 2200 -- 97 -- -- --   01/13/23 2000 -- 95 -- -- --   01/13/23 1754 -- 100 -- -- --   01/13/23 1718 97.9 °F (36.6 °C) (!) 103 17 (!) 121/59 97 %   01/13/23 1600 -- (!) 106 -- -- --   01/13/23 1331 -- -- -- (!) 112/42 --   01/13/23 1330 -- -- -- (!) 133/40 --   01/13/23 1329 -- -- -- (!) 148/57 --   01/13/23 1211 97.7 °F (36.5 °C) 95 18 (!) 124/56 100 %   01/13/23 1200 -- 98 -- -- --   01/13/23 1005 -- (!) 102 -- -- 98 %   01/13/23 1000 -- (!) 101 -- -- --   01/13/23 0702 98 °F (36.7 °C) 94 18 (!) 96/46 99 %   01/13/23 0552 -- 91 -- -- --   01/13/23 0500 98.7 °F (37.1 °C) 91 18 (!) 102/43 97 %   01/13/23 0348 -- 96 -- -- --   01/13/23 0154 -- (!) 103 -- -- --          Intake/Output Summary (Last 24 hours) at 1/13/2023 2332  Last data filed at 1/13/2023 1600  Gross per 24 hour   Intake 678 ml   Output 2650 ml   Net -1972 ml          Physical Examination:     I had a face to face encounter with this patient and independently examined them on 1/13/2023 as outlined below:          Constitutional:  No acute distress, , pleasant, frail elderly male   ENT:  Oral mucosa dry  EOMI    Resp:  CTA bilaterally. No wheezing/rhonchi/rales. No accessory muscle use. CV:  Regular rhythm, normal rate, no murmurs, gallops, rubs    GI:  Soft, non distended, non tender. Bs+    Musculoskeletal:  No edema, warm, 2+ pulses throughout    Neurologic:  Moves all extremities.   Awake and alert, CN II-XII reviewed            Data Review:    Review and/or order of clinical lab test  Review and/or order of tests in the radiology section of CPT  Review and/or order of tests in the medicine section of CPT      Labs:     Recent Labs     01/13/23  0526 01/11/23  0051   WBC 5.7 6.6   HGB 8.5* 8.5*   HCT 28.5* 29.4*    276       Recent Labs     01/13/23  0526 01/12/23  0500 01/11/23  0051   * 135* 136   K 4.6 4.5 4.6    106 106   CO2 23 24 22   BUN 29* 27* 27*   CREA 1.28 1.29 1.20   * 118* 146*   CA 9.4 9.7 9.2   MG 2.3 2.3 2.2   PHOS 3.4 3.5 3.2       Recent Labs     01/13/23  0526 01/12/23  0500 01/11/23  0051   ALT 55 69 69   * 220* 212*   TBILI 0.4 0.5 0.4   TP 6.0* 7.1 6.0*   ALB 3.0* 3.3* 3.0*   GLOB 3.0 3.8 3.0       Recent Labs     01/12/23  0500 01/11/23  1814   INR  --  1.0   PTP  --  10.8   APTT 27.9  --         No results for input(s): FE, TIBC, PSAT, FERR in the last 72 hours. Lab Results   Component Value Date/Time    Folate 10.5 07/03/2018 09:24 AM        No results for input(s): PH, PCO2, PO2 in the last 72 hours. No results for input(s): CPK, CKNDX, TROIQ in the last 72 hours.     No lab exists for component: CPKMB    Lab Results   Component Value Date/Time    Cholesterol, total 170 01/12/2023 05:00 AM    HDL Cholesterol 40 01/12/2023 05:00 AM    LDL, calculated 87 01/12/2023 05:00 AM    Triglyceride 215 (H) 01/12/2023 05:00 AM    CHOL/HDL Ratio 4.3 01/12/2023 05:00 AM     Lab Results   Component Value Date/Time    Glucose (POC) 222 (H) 01/13/2023 09:26 PM    Glucose (POC) 159 (H) 01/13/2023 05:09 PM    Glucose (POC) 266 (H) 01/13/2023 11:09 AM    Glucose (POC) 128 (H) 01/13/2023 07:59 AM    Glucose (POC) 239 (H) 01/12/2023 09:02 PM     Lab Results   Component Value Date/Time    Color YELLOW/STRAW 01/01/2023 09:13 AM    Appearance CLEAR 01/01/2023 09:13 AM    Specific gravity 1.013 01/01/2023 09:13 AM    Specific gravity 1.020 05/03/2014 08:18 AM    pH (UA) 5.5 01/01/2023 09:13 AM    Protein 30 (A) 01/01/2023 09:13 AM    Glucose Negative 01/01/2023 09:13 AM    Ketone Negative 01/01/2023 09:13 AM    Bilirubin Negative 01/01/2023 09:13 AM    Urobilinogen 1.0 01/01/2023 09:13 AM    Nitrites Negative 01/01/2023 09:13 AM    Leukocyte Esterase Negative 01/01/2023 09:13 AM    Epithelial cells FEW 01/01/2023 09:13 AM    Bacteria Negative 01/01/2023 09:13 AM    WBC 0-4 01/01/2023 09:13 AM    RBC 0-5 01/01/2023 09:13 AM         Medications Reviewed:     Current Facility-Administered Medications   Medication Dose Route Frequency    finasteride (PROSCAR) tablet 5 mg  5 mg Oral DAILY WITH DINNER    insulin lispro (HUMALOG) injection 6 Units  6 Units SubCUTAneous TID WITH MEALS    ivabradine (CORLANOR) tablet 2.5 mg  2.5 mg Oral BID WITH MEALS    milrinone (PRIMACOR) 20 MG/100 ML D5W infusion  0.25 mcg/kg/min IntraVENous CONTINUOUS    insulin glargine (LANTUS) injection 6 Units  6 Units SubCUTAneous QHS    insulin lispro (HUMALOG) injection   SubCUTAneous AC&HS    dextrose 10 % infusion 0-250 mL  0-250 mL IntraVENous PRN    spironolactone (ALDACTONE) tablet 12.5 mg  12.5 mg Oral DAILY    melatonin tablet 3 mg  3 mg Oral QHS PRN    empagliflozin (JARDIANCE) tablet 10 mg  10 mg Oral DAILY    dextrose 10 % infusion 0-250 mL  0-250 mL IntraVENous PRN    albuterol-ipratropium (DUO-NEB) 2.5 MG-0.5 MG/3 ML  3 mL Nebulization Q6H PRN    digoxin (LANOXIN) tablet 0.125 mg  0.125 mg Oral DAILY    mirtazapine (REMERON) tablet 7.5 mg  7.5 mg Oral QHS    [Held by provider] potassium chloride SR (KLOR-CON 10) tablet 40 mEq  40 mEq Oral BID    pantoprazole (PROTONIX) tablet 40 mg  40 mg Oral ACB    heparin (porcine) injection 5,000 Units  5,000 Units SubCUTAneous Q12H    QUEtiapine (SEROquel) tablet 50 mg  50 mg Oral QHS    lidocaine 4 % patch 1 Patch  1 Patch TransDERmal Q24H    balsam peru-castor oiL (VENELEX) ointment   Topical BID    alteplase (CATHFLO) 1 mg in sterile water (preservative free) 1 mL injection  1 mg InterCATHeter PRN    bacitracin 500 unit/gram packet 1 Packet  1 Packet Topical PRN    glucose chewable tablet 16 g  4 Tablet Oral PRN    glucagon (GLUCAGEN) injection 1 mg  1 mg IntraMUSCular PRN    senna-docusate (PERICOLACE) 8.6-50 mg per tablet 1 Tablet  1 Tablet Oral BID PRN    bisacodyL (DULCOLAX) suppository 10 mg  10 mg Rectal QHS PRN    sodium chloride (NS) flush 5-40 mL  5-40 mL IntraVENous Q8H    sodium chloride (NS) flush 5-40 mL  5-40 mL IntraVENous PRN    acetaminophen (TYLENOL) tablet 650 mg  650 mg Oral Q6H PRN    Or    acetaminophen (TYLENOL) suppository 650 mg  650 mg Rectal Q6H PRN    polyethylene glycol (MIRALAX) packet 17 g  17 g Oral DAILY PRN    ondansetron (ZOFRAN ODT) tablet 4 mg  4 mg Oral Q8H PRN    Or    ondansetron (ZOFRAN) injection 4 mg  4 mg IntraVENous Q6H PRN    aspirin delayed-release tablet 81 mg  81 mg Oral DAILY    ipratropium (ATROVENT) 21 mcg (0.03 %) nasal spray 2 Spray  2 Spray Both Nostrils Q12H    tamsulosin (FLOMAX) capsule 0.4 mg  0.4 mg Oral DAILY    sodium chloride (NS) flush 5-40 mL  5-40 mL IntraVENous PRN     ______________________________________________________________________  EXPECTED LENGTH OF STAY: 5d 0h  ACTUAL LENGTH OF STAY:          22                 Debby Evangelista MD

## 2023-01-15 LAB
ABO + RH BLD: NORMAL
ALBUMIN SERPL-MCNC: 2.8 G/DL (ref 3.5–5)
ALBUMIN/GLOB SERPL: 0.8 (ref 1.1–2.2)
ALP SERPL-CCNC: 174 U/L (ref 45–117)
ALT SERPL-CCNC: 47 U/L (ref 12–78)
ANION GAP SERPL CALC-SCNC: 5 MMOL/L (ref 5–15)
AST SERPL-CCNC: 26 U/L (ref 15–37)
BILIRUB SERPL-MCNC: 0.3 MG/DL (ref 0.2–1)
BLD PROD TYP BPU: NORMAL
BLOOD GROUP ANTIBODIES SERPL: NORMAL
BLOOD GROUP ANTIBODIES SERPL: NORMAL
BNP SERPL-MCNC: 2914 PG/ML
BPU ID: NORMAL
BUN SERPL-MCNC: 28 MG/DL (ref 6–20)
BUN/CREAT SERPL: 21 (ref 12–20)
CALCIUM SERPL-MCNC: 9.1 MG/DL (ref 8.5–10.1)
CHLORIDE SERPL-SCNC: 106 MMOL/L (ref 97–108)
CO2 SERPL-SCNC: 24 MMOL/L (ref 21–32)
CREAT SERPL-MCNC: 1.36 MG/DL (ref 0.7–1.3)
CROSSMATCH RESULT,%XM: NORMAL
CRP SERPL-MCNC: 1.28 MG/DL (ref 0–0.6)
DIGOXIN SERPL-MCNC: 1.2 NG/ML (ref 0.9–2)
ERYTHROCYTE [DISTWIDTH] IN BLOOD BY AUTOMATED COUNT: 24.2 % (ref 11.5–14.5)
GLOBULIN SER CALC-MCNC: 3.5 G/DL (ref 2–4)
GLUCOSE BLD STRIP.AUTO-MCNC: 131 MG/DL (ref 65–117)
GLUCOSE BLD STRIP.AUTO-MCNC: 184 MG/DL (ref 65–117)
GLUCOSE BLD STRIP.AUTO-MCNC: 222 MG/DL (ref 65–117)
GLUCOSE BLD STRIP.AUTO-MCNC: 234 MG/DL (ref 65–117)
GLUCOSE SERPL-MCNC: 224 MG/DL (ref 65–100)
HCT VFR BLD AUTO: 30.3 % (ref 36.6–50.3)
HGB BLD-MCNC: 8.7 G/DL (ref 12.1–17)
MCH RBC QN AUTO: 23.8 PG (ref 26–34)
MCHC RBC AUTO-ENTMCNC: 28.7 G/DL (ref 30–36.5)
MCV RBC AUTO: 82.8 FL (ref 80–99)
NRBC # BLD: 0 K/UL (ref 0–0.01)
NRBC BLD-RTO: 0 PER 100 WBC
PLATELET # BLD AUTO: 258 K/UL (ref 150–400)
PMV BLD AUTO: 10.6 FL (ref 8.9–12.9)
POTASSIUM SERPL-SCNC: 4.8 MMOL/L (ref 3.5–5.1)
PROCALCITONIN SERPL-MCNC: 0.29 NG/ML
PROT SERPL-MCNC: 6.3 G/DL (ref 6.4–8.2)
RBC # BLD AUTO: 3.66 M/UL (ref 4.1–5.7)
SERVICE CMNT-IMP: ABNORMAL
SODIUM SERPL-SCNC: 135 MMOL/L (ref 136–145)
SPECIMEN EXP DATE BLD: NORMAL
STATUS OF UNIT,%ST: NORMAL
UNIT DIVISION, %UDIV: 0
WBC # BLD AUTO: 5.8 K/UL (ref 4.1–11.1)

## 2023-01-15 PROCEDURE — 74011250637 HC RX REV CODE- 250/637: Performed by: NURSE PRACTITIONER

## 2023-01-15 PROCEDURE — 82962 GLUCOSE BLOOD TEST: CPT

## 2023-01-15 PROCEDURE — 51798 US URINE CAPACITY MEASURE: CPT

## 2023-01-15 PROCEDURE — 85027 COMPLETE CBC AUTOMATED: CPT

## 2023-01-15 PROCEDURE — 86140 C-REACTIVE PROTEIN: CPT

## 2023-01-15 PROCEDURE — 80162 ASSAY OF DIGOXIN TOTAL: CPT

## 2023-01-15 PROCEDURE — 36415 COLL VENOUS BLD VENIPUNCTURE: CPT

## 2023-01-15 PROCEDURE — 74011250636 HC RX REV CODE- 250/636: Performed by: STUDENT IN AN ORGANIZED HEALTH CARE EDUCATION/TRAINING PROGRAM

## 2023-01-15 PROCEDURE — 74011250637 HC RX REV CODE- 250/637: Performed by: FAMILY MEDICINE

## 2023-01-15 PROCEDURE — 99233 SBSQ HOSP IP/OBS HIGH 50: CPT | Performed by: INTERNAL MEDICINE

## 2023-01-15 PROCEDURE — 65660000001 HC RM ICU INTERMED STEPDOWN

## 2023-01-15 PROCEDURE — 84145 PROCALCITONIN (PCT): CPT

## 2023-01-15 PROCEDURE — 74011250637 HC RX REV CODE- 250/637: Performed by: STUDENT IN AN ORGANIZED HEALTH CARE EDUCATION/TRAINING PROGRAM

## 2023-01-15 PROCEDURE — 80053 COMPREHEN METABOLIC PANEL: CPT

## 2023-01-15 PROCEDURE — 83880 ASSAY OF NATRIURETIC PEPTIDE: CPT

## 2023-01-15 PROCEDURE — 74011000250 HC RX REV CODE- 250: Performed by: STUDENT IN AN ORGANIZED HEALTH CARE EDUCATION/TRAINING PROGRAM

## 2023-01-15 PROCEDURE — 74011636637 HC RX REV CODE- 636/637: Performed by: CLINICAL NURSE SPECIALIST

## 2023-01-15 PROCEDURE — 74011250637 HC RX REV CODE- 250/637: Performed by: INTERNAL MEDICINE

## 2023-01-15 PROCEDURE — 74011250636 HC RX REV CODE- 250/636: Performed by: INTERNAL MEDICINE

## 2023-01-15 PROCEDURE — P9047 ALBUMIN (HUMAN), 25%, 50ML: HCPCS | Performed by: INTERNAL MEDICINE

## 2023-01-15 RX ORDER — MILRINONE LACTATE 0.2 MG/ML
0.2 INJECTION, SOLUTION INTRAVENOUS CONTINUOUS
Status: DISCONTINUED | OUTPATIENT
Start: 2023-01-15 | End: 2023-01-19 | Stop reason: HOSPADM

## 2023-01-15 RX ORDER — MIDODRINE HYDROCHLORIDE 5 MG/1
10 TABLET ORAL
Status: DISCONTINUED | OUTPATIENT
Start: 2023-01-15 | End: 2023-01-17

## 2023-01-15 RX ORDER — ALBUMIN HUMAN 250 G/1000ML
12.5 SOLUTION INTRAVENOUS ONCE
Status: COMPLETED | OUTPATIENT
Start: 2023-01-15 | End: 2023-01-15

## 2023-01-15 RX ORDER — SODIUM CHLORIDE 9 MG/ML
500 INJECTION, SOLUTION INTRAVENOUS CONTINUOUS
Status: DISCONTINUED | OUTPATIENT
Start: 2023-01-15 | End: 2023-01-15

## 2023-01-15 RX ORDER — DIGOXIN 125 MCG
0.12 TABLET ORAL EVERY OTHER DAY
Status: DISCONTINUED | OUTPATIENT
Start: 2023-01-16 | End: 2023-01-19 | Stop reason: HOSPADM

## 2023-01-15 RX ADMIN — Medication 2 UNITS: at 13:40

## 2023-01-15 RX ADMIN — MIRTAZAPINE 7.5 MG: 15 TABLET, FILM COATED ORAL at 21:17

## 2023-01-15 RX ADMIN — IPRATROPIUM BROMIDE 2 SPRAY: 21 SPRAY, METERED NASAL at 08:38

## 2023-01-15 RX ADMIN — Medication 6 UNITS: at 08:37

## 2023-01-15 RX ADMIN — INSULIN GLARGINE 6 UNITS: 100 INJECTION, SOLUTION SUBCUTANEOUS at 21:23

## 2023-01-15 RX ADMIN — ALBUMIN (HUMAN) 12.5 G: 0.25 INJECTION, SOLUTION INTRAVENOUS at 07:30

## 2023-01-15 RX ADMIN — Medication 6 UNITS: at 13:40

## 2023-01-15 RX ADMIN — IPRATROPIUM BROMIDE 2 SPRAY: 21 SPRAY, METERED NASAL at 21:16

## 2023-01-15 RX ADMIN — Medication 3 MG: at 21:16

## 2023-01-15 RX ADMIN — SODIUM CHLORIDE, PRESERVATIVE FREE 10 ML: 5 INJECTION INTRAVENOUS at 21:17

## 2023-01-15 RX ADMIN — ACETAMINOPHEN 650 MG: 325 TABLET ORAL at 21:16

## 2023-01-15 RX ADMIN — IVABRADINE 2.5 MG: 5 TABLET, FILM COATED ORAL at 08:34

## 2023-01-15 RX ADMIN — MIDODRINE HYDROCHLORIDE 10 MG: 5 TABLET ORAL at 17:36

## 2023-01-15 RX ADMIN — Medication 6 UNITS: at 17:36

## 2023-01-15 RX ADMIN — Medication: at 17:40

## 2023-01-15 RX ADMIN — FINASTERIDE 5 MG: 5 TABLET, FILM COATED ORAL at 17:36

## 2023-01-15 RX ADMIN — MIDODRINE HYDROCHLORIDE 10 MG: 5 TABLET ORAL at 12:12

## 2023-01-15 RX ADMIN — MILRINONE LACTATE IN DEXTROSE 0.2 MCG/KG/MIN: 200 INJECTION, SOLUTION INTRAVENOUS at 18:17

## 2023-01-15 RX ADMIN — QUETIAPINE FUMARATE 50 MG: 25 TABLET ORAL at 21:16

## 2023-01-15 RX ADMIN — HEPARIN SODIUM 5000 UNITS: 5000 INJECTION INTRAVENOUS; SUBCUTANEOUS at 08:38

## 2023-01-15 RX ADMIN — Medication 1 UNITS: at 21:23

## 2023-01-15 RX ADMIN — TAMSULOSIN HYDROCHLORIDE 0.4 MG: 0.4 CAPSULE ORAL at 08:37

## 2023-01-15 RX ADMIN — SODIUM CHLORIDE 500 ML: 9 INJECTION, SOLUTION INTRAVENOUS at 07:30

## 2023-01-15 RX ADMIN — IVABRADINE 2.5 MG: 5 TABLET, FILM COATED ORAL at 17:36

## 2023-01-15 RX ADMIN — SODIUM CHLORIDE, PRESERVATIVE FREE 10 ML: 5 INJECTION INTRAVENOUS at 07:30

## 2023-01-15 RX ADMIN — PANTOPRAZOLE SODIUM 40 MG: 40 TABLET, DELAYED RELEASE ORAL at 07:30

## 2023-01-15 RX ADMIN — ASPIRIN 81 MG: 81 TABLET, COATED ORAL at 08:35

## 2023-01-15 RX ADMIN — SODIUM CHLORIDE, PRESERVATIVE FREE 10 ML: 5 INJECTION INTRAVENOUS at 17:39

## 2023-01-15 RX ADMIN — HEPARIN SODIUM 5000 UNITS: 5000 INJECTION INTRAVENOUS; SUBCUTANEOUS at 21:16

## 2023-01-15 RX ADMIN — Medication: at 08:38

## 2023-01-15 NOTE — PROGRESS NOTES
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, LVEF 25-30%, stage D, NYHA class IIIB/IV; initiated on home milrinone gtt as bridge to completion of LVAD workup  Most likely etiology of cardiomyopathy: CAD +/- TRISTAN +/- inappropriate sinus tach +/- undergoing workup   Tentative discharge home after scopes, probably end of the week      RECOMMENDATIONS:  Decrease milrinone to 0.2mcg/kg/min and dose to 52kg, unable to uptitrate due to HR and BP  Cannot tolerate beta-blockers due to hypotension  Cannot tolerate ARB/ARNi due to hypotension  Continue spironolactone 12.5mg daily; may need to hold if K continues to rise  Hold jardiance 10mg daily as it may contribute to IVVD  Continue corlanor 2.5mg twice daily, goal HR 70-80s  Decrease digoxin 0.125mcg to every other day, check digoxin level daily (today 1.2 goal 0.7-1.2)  Hold diuretics, give 500cc IV saline today  Give albumin x once  Check orthostatics after IV fluids given  Continue baby aspirin   Ok to stop Plavix per Dr. Liza Maurice on GI Scopes on   Inpatient sleep medicine consult pending (high risk for TRISTAN)  Appreciate urology recs, will need OP follow up   Discussed with the patient that we need to revise DNR status, if patient wants aggressive measures, so that we can plan on Lifevest at discharge and that we can offer rescue therapies in case of decompensation as bridge to LVAD; patient does not want to make decisions now, defers to his family > will re-discuss Monday  Physical therapy/ambulate daily  Plan on DC with home inotropic support as bridge to LVAD  VAD evaluate in progress, will need to do dental, urology, sleep medicine as OP  Ambulate daily     All other care per primary team, hopefully home end of the week     IMPRESSION:  Acute on chronic combined systolic/diastolic  heart failure  Stage D, NYHA class IIIB/IV symptoms  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, LVEF 25-30% and 23% (by cMRI 1/3/23)  Cardiac MRI suggestive of ischemic cardiomyopathy  PYP equivocal  Coronary artery disease  CAD s/p CABG x 2: further disease best managed medically due to small vessel size   Peripheral arterial disease  Bilateral hydronephrosis s/p donnelly  Cardiac risk factors:  HTN  HL  TRISTAN, STOP-BANG 4  DM2  CKD, stage 3  MOCA from 1/4/22 17/30, consistent with mild cognitive impairment      INTERVAL EVENTS:  Ambulating well, 3 laps  Improved appetite   Afebrile  SBP 80-90s/30s, HR 80s  I/O net neg 2.2 liters  Weight 114lbs from 114lbs  Hgb 8.7  Cr 1.36 from 1.29  K 4.8 from 4.5  Alb 2.8 from 2.9  T Bili 0.4  NT 2914 from 2221  Procalcitonin 0.29 from 0.28, negative     LIFE GOALS:  Lifestyle goals reviewed with the patient. Patient's personal goals include: TBD  Important upcoming milestones or family events: TBD  The patient identifies the following as important for living well: TBD     Patient assessed. High suspicion of sleep apnea due to the following reasons. Will refer for sleep evaluation. [x] Heart Failure  [] Hypertention  [x] Atrial Fibrillation  [] BMI > 30  [] History of stroke  [] Diabetes  [x] Heavy snoring  [] Witnessed apnea  [] Hypoxemia                    HPI:  71 y.o. male who was admitted via ED for worsening SOB, poor appetite, orthopnea and fatigue. Pmhx of CAD s/p CABG, PAD, DM 2, recent admission for HFmrEF earlier this month. Serial TTEs show progressive decline in EF since 3/2021 with the most recent EF at 25-30%. Recent LHC shows diffuse CAD and no interventions indicated.  Patient had [employer] recently and there is some thought to myocarditis or other etiology causing worsening HF. [facility] was consulted for further evaluation and management of HFrEF. CARDIAC IMAGING:  Echo 1/9/23   Left Ventricle: Severely reduced left ventricular systolic function with a visually estimated EF of 25 - 30%. Left ventricle size is normal. Mildly increased wall thickness. Echo 12/26/22    Left Ventricle: Severely reduced left ventricular systolic function with a visually estimated EF of 25 - 30%. Left ventricle size is normal. Normal wall thickness. There are regional wall motion abnormalities. Grade II diastolic dysfunction with increased LAP. Right Ventricle: Moderately reduced systolic function. TAPSE is abnormal. TAPSE is 1.1 cm. Aortic Valve: Mild stenosis of the aortic valve. AV peak gradient is 13 mmHg. AV peak velocity is 1.8 m/s. Mitral Valve: Not well visualized. Moderate annular calcification at the posterior leaflet of the mitral valve. Mild to moderate regurgitation. Tricuspid Valve: Mildly elevated RVSP. Left Atrium: Left atrium is moderately dilated. 12/8/22    Left Ventricle: Moderately reduced left ventricular systolic function with a visually estimated EF of 35 - 40%. Severe hypokinesis of the following segments: mid anteroseptal, apical anterior, apical septal, apical inferior and apical lateral. Severe hypokinesis of the apex. Mitral Valve: Severely thickened leaflet, at the anterior and posterior leaflets. Severely calcified leaflet, at the anterior and posterior leaflets. Mild annular calcification of the mitral valve. Moderate regurgitation. Left Atrium: Left atrium is mildly dilated. Contrast used: Definity. limited study     EKG 12/22/22 ST, Biatria enlargement, marked ST abnormality     C 12/6/22  1. Normal LVEDP  2. Severe native multivessel coronary artery disease  3. Patent LIMA to LAD and vein graft to distal RCA  4. Recurrent ISR in OM1 stent with now 60 to 70% restenosis  5.   Recoil of left main and circumflex stent with now recurrent 40 to 50% stenosis. 6.  Progression of ostial left main disease now to about 60% stenosis  7. Progression of disease in jailed first marginal branch now with diffuse 90% stenosis  8. High-grade stenosis in the mid to distal right potential femoral artery treated with 6 x 40 mm impact drug-coated balloon angioplasty to reduce the stenosis to less than 40%     NST        HEMODYNAMICS:  RHC 1/9/23  PA 20/9, RA 3, PCWP 8, CI 1.8     CPEST too ill   6MW 300 feet       PHYSICAL EXAM:  Visit Vitals  BP (!) 85/38   Pulse 82   Temp 97.8 °F (36.6 °C)   Resp 16   Ht 5' 9\" (1.753 m)   Wt 114 lb 10.2 oz (52 kg)   SpO2 100%   BMI 16.93 kg/m²     General: Patient is well developed, well-nourished in no acute distress  HEENT: Unremarkable   Neck: Supple. No evidence of thyroid enlargements or lymphadenopathy. JVD: Cannot be appreciated   Lungs: Breath sounds are equal and clear bilaterally. No wheezes, rhonchi, or rales. Heart: Regular rate and rhythm with normal S1 and S2. No murmurs, gallops or rubs. Abdomen: Soft, no mass or tenderness. No organomegaly or hernia. Bowel sounds present. Genitourinary and rectal: deferred  Extremities: No cyanosis, clubbing, or edema. Neurologic: No focal sensory or motor deficits are noted. Grossly intact. Psychiatric: Awake, alert an doriented x 3. Appropriate mood and affect. Skin: Warm, dry and well perfused. REVIEW OF SYSTEMS:  General: Denies fever. Ear, nose and throat: Denies difficulty hearing, sinus problems, nosebleeds  Cardiovascular: see above in the interval history  Respiratory: Denies cough, wheezing, sputum production, hemoptysis.   Gastrointestinal: Denies heartburn, constipation, diarrhea, abdominal pain, nausea, blood in stool  Kidney and bladder: Denies painful urination, frequent urination  Musculoskeletal: Denies joint pain, muscle weakness  Skin and hair: Denies change in existing skin lesions    PAST MEDICAL HISTORY:  Past Medical History:   Diagnosis Date    CAD (coronary artery disease) 11/10/2016    NSTEMI & 2 stents    Deafness 10/28/2012    DM (diabetes mellitus) ([facility] [address]     Elevated cholesterol     Hypertension     NSTEMI (non-ST elevated myocardial infarction) ([facility] [address] 11/10/2016       PAST SURGICAL HISTORY:  Past Surgical History:   Procedure Laterality Date    COLONOSCOPY N/A 6/28/2018    COLONOSCOPY performed by Marleny Giron MD at [facility] ENDOSCOPY    [address] [place]  11/11/2016    2 stents       FAMILY HISTORY:  Family History   Problem Relation Age of Onset    Heart Disease Father     Heart Attack Father     Hypertension Mother     Elevated Lipids Brother     Elevated Lipids Brother     No Known Problems Sister     Elevated Lipids Brother     No Known Problems Son     No Known Problems Daughter     Anesth Problems Neg Hx        SOCIAL HISTORY:  Social History     Socioeconomic History    Marital status:    Tobacco Use    Smoking status: Never     Passive exposure: Never    Smokeless tobacco: Never   Vaping Use    Vaping Use: Never used   Substance and Sexual Activity    Alcohol use: Yes     Alcohol/week: 2.0 standard drinks     Types: 1 Cans of beer, 1 Shots of liquor per week     Comment: rarely    Drug use: No    Sexual activity: Yes     Social Determinants of Health     Financial Resource Strain: Medium Risk    Difficulty of Paying Living Expenses: Somewhat hard   Food Insecurity: Food Insecurity Present    Worried About Running Out of Food in the Last Year: Never true    Ran Out of Food in the Last Year: Often true       LABORATORY RESULTS:     Labs Latest Ref Rng & Units 1/15/2023 1/14/2023 1/13/2023 1/12/2023 1/11/2023 1/10/2023 1/9/2023   WBC 4.1 - 11.1 K/uL 5.8 - 5.7 - 6.6 - 9.0   RBC 4.10 - 5.70 M/uL 3.66(L) - 3.55(L) - 3.65(L) - 3.66(L)   Hemoglobin 12.1 - 17.0 g/dL 8.7(L) - 8. 5(L) - 8. 5(L) - 8. 7(L)   Hematocrit 36.6 - 50.3 % 30. 3(L) - 28. 5(L) - 29. 4(L) - 29. 2(L)   MCV 80.0 - 99.0 FL 82.8 - 80.3 - 80.5 - 79. 8(L)   Platelets 515 - 657 K/uL 258 - 264 - 276 - 254   Lymphocytes 12 - 49 % - - - - - - -   Monocytes 5 - 13 % - - - - - - -   Eosinophils 0 - 7 % - - - - - - -   Basophils 0 - 1 % - - - - - - -   Albumin 3.5 - 5.0 g/dL 2. 8(L) 2. 9(L) 3.0(L) 3. 3(L) 3.0(L) 2. 9(L) 2. 9(L)   Calcium 8.5 - 10.1 MG/DL 9.1 9.2 9.4 9.7 9.2 9.3 9.4   Glucose 65 - 100 mg/dL 224(H) 162(H) 106(H) 118(H) 146(H) 77 192(H)   BUN 6 - 20 MG/DL 28(H) 29(H) 29(H) 27(H) 27(H) 30(H) 36(H)   Creatinine 0.70 - 1.30 MG/DL 1.36(H) 1.29 1.28 1.29 1.20 1.14 1.27   Sodium 136 - 145 mmol/L 135(L) 136 135(L) 135(L) 136 136 138   Potassium 3.5 - 5.1 mmol/L 4.8 4.5 4.6 4.5 4.6 4.3 4.6   TSH 0.36 - 3.74 uIU/mL - - - - - - -   PSA 0.01 - 4.0 ng/mL - - - - - - -   LDH 85 - 241 U/L - - - 189 - - -   Some recent data might be hidden     Lab Results   Component Value Date/Time    TSH 2.12 12/27/2022 02:36 PM    TSH 4.80 (H) 12/06/2022 03:53 AM    TSH 5.39 (H) 10/12/2022 09:10 AM    TSH 3.53 02/03/2022 11:47 AM    TSH 5.790 (H) 11/21/2019 04:45 PM    TSH 3.08 06/22/2018 01:53 PM    TSH 4.250 05/26/2015 09:43 AM       ALLERGY:  No Known Allergies     CURRENT MEDICATIONS:    Current Facility-Administered Medications:     [START ON 1/16/2023] digoxin (LANOXIN) tablet 0.125 mg, 0.125 mg, Oral, EVERY OTHER DAY, Veronica Bonilla MD    0.9% sodium chloride infusion 500 mL, 500 mL, IntraVENous, CONTINUOUS, Veronica Bonilla MD, 500 mL at 01/15/23 0730    finasteride (PROSCAR) tablet 5 mg, 5 mg, Oral, DAILY WITH DINNER, Merary Tolbert MD, 5 mg at 01/14/23 1614    insulin lispro (HUMALOG) injection 6 Units, 6 Units, SubCUTAneous, TID WITH MEALS, Cathy Sheldon CNS, 6 Units at 01/15/23 0837    ivabradine (CORLANOR) tablet 2.5 mg, 2.5 mg, Oral, BID WITH MEALS, Lizette Linton NP, 2.5 mg at 01/15/23 0834    milrinone (PRIMACOR) 20 MG/100 ML D5W infusion, 0.25 mcg/kg/min, IntraVENous, CONTINUOUS, Lizette Linton NP, Last Rate: 3.9 mL/hr at 01/14/23 1614, 0.25 mcg/kg/min at 01/14/23 1614    insulin glargine (LANTUS) injection 6 Units, 6 Units, SubCUTAneous, QHS, Cathy Sheldon, CNS, 6 Units at 01/14/23 2148    insulin lispro (HUMALOG) injection, , SubCUTAneous, AC&HS, Cathy Sheldon, CNS, 1 Units at 01/14/23 2148    dextrose 10 % infusion 0-250 mL, 0-250 mL, IntraVENous, PRN, Cathy Sheldon, CNS    spironolactone (ALDACTONE) tablet 12.5 mg, 12.5 mg, Oral, DAILY, Lizette Linton NP, 12.5 mg at 01/14/23 0929    melatonin tablet 3 mg, 3 mg, Oral, QHS PRN, Modesta Duncan NP, 3 mg at 01/14/23 2152    [Held by provider] empagliflozin (JARDIANCE) tablet 10 mg, 10 mg, Oral, DAILY, Jack Del Toro MD, 10 mg at 01/14/23 0929    dextrose 10 % infusion 0-250 mL, 0-250 mL, IntraVENous, PRN, Cathy Sheldon, SLICK    albuterol-ipratropium (DUO-NEB) 2.5 MG-0.5 MG/3 ML, 3 mL, Nebulization, Q6H PRN, Fausto Sofia MD    mirtazapine (REMERON) tablet 7.5 mg, 7.5 mg, Oral, QHS, Migdalia Hays MD, 7.5 mg at 01/14/23 2152    [Held by provider] potassium chloride SR (KLOR-CON 10) tablet 40 mEq, 40 mEq, Oral, BID, Lilian MOLINA MD, 40 mEq at 01/03/23 0838    pantoprazole (PROTONIX) tablet 40 mg, 40 mg, Oral, ACB, David Aguilar G, DO, 40 mg at 01/15/23 0730    heparin (porcine) injection 5,000 Units, 5,000 Units, SubCUTAneous, Q12H, Kel Bustos, DO, 5,000 Units at 01/15/23 5226    QUEtiapine (SEROquel) tablet 50 mg, 50 mg, Oral, QHS, David Aguilar G, DO, 50 mg at 01/14/23 2152    lidocaine 4 % patch 1 Patch, 1 Patch, TransDERmal, Q24H, Lilian MOLINA MD, 1 Patch at 01/11/23 1734    balsam peru-castor oiL (VENELEX) ointment, , Topical, BID, João Chan MD, Given at 01/15/23 8703    alteplase (CATHFLO) 1 mg in sterile water (preservative free) 1 mL injection, 1 mg, InterCATHeter, PRN, Noman Foley MD    bacitracin 500 unit/gram packet 1 Packet, 1 Packet, Topical, PRN, Lilian MOLINA MD    glucose chewable tablet 16 g, 4 Tablet, Oral, PRN, Riaz Ken MD    glucagon (GLUCAGEN) injection 1 mg, 1 mg, IntraMUSCular, PRN, Riaz Ken MD    senna-docusate (Gala Rushing) 8.6-50 mg per tablet 1 Tablet, 1 Tablet, Oral, BID PRN, Riaz Ken MD, 1 Tablet at 12/26/22 0120    bisacodyL (DULCOLAX) suppository 10 mg, 10 mg, Rectal, QHS PRN, Riaz Ken MD    sodium chloride (NS) flush 5-40 mL, 5-40 mL, IntraVENous, Q8H, Walker Aponte MD, 10 mL at 01/15/23 0730    sodium chloride (NS) flush 5-40 mL, 5-40 mL, IntraVENous, PRN, Riaz Ken MD, 10 mL at 12/28/22 0845    acetaminophen (TYLENOL) tablet 650 mg, 650 mg, Oral, Q6H PRN, 650 mg at 01/14/23 2152 **OR** acetaminophen (TYLENOL) suppository 650 mg, 650 mg, Rectal, Q6H PRN, Walker Corado MD    polyethylene glycol (MIRALAX) packet 17 g, 17 g, Oral, DAILY PRN, Riaz Ken MD, 17 g at 12/26/22 0649    ondansetron (ZOFRAN ODT) tablet 4 mg, 4 mg, Oral, Q8H PRN **OR** ondansetron (ZOFRAN) injection 4 mg, 4 mg, IntraVENous, Q6H PRN, Sherie MOLINA MD, 4 mg at 12/24/22 0849    aspirin delayed-release tablet 81 mg, 81 mg, Oral, DAILY, Francois Baker MD, 81 mg at 01/15/23 0835    ipratropium (ATROVENT) 21 mcg (0.03 %) nasal spray 2 Spray, 2 Spray, Both Nostrils, Q12H, Cuca Pearson MD, 2 [place] at 01/15/23 0838    tamsulosin (FLOMAX) capsule 0.4 mg, 0.4 mg, Oral, DAILY, Cuca Pearson MD, 0.4 mg at 01/15/23 0837    sodium chloride (NS) flush 5-40 mL, 5-40 mL, IntraVENous, PRN, Riaz Ken MD    PATIENT CARE TEAM:  Patient Care Team:  Lucretia Buckner MD as PCP - General (Family Medicine)  Lucretia Buckner MD as PCP - [facility] Provider  Elaine Loo MD (Cardiovascular Disease Physician)  Damion Layne MD (Gastroenterology)  Devaughn Lewis MD (Cardiothoracic Surgery)  Susanna Primrose, MD (Cardiovascular Disease Physician)  Viji Easton MD (Nephrology)  Maria D Negron, RN as Care Transitions Nurse  Yaquelin Moffett, Guadalupe Scott MD (Pulmonary Disease)     Thank you for allowing me to participate in this patient's care.     Ruben Bentley MD   [address] [place] [address], [address]  Phone: [phone]

## 2023-01-15 NOTE — PROGRESS NOTES
1930  Verbal bedside report given to Rodrigo Cook RN oncoming nurse by Ronnell Reynolds. Janna Amaya RN off-going nurse. Report included current pt status and condition, recent results, hx of present illness, heart rate and rhythm (NSR), and respiratory status. 0840  BP still running low, held diuretic per provider. 0730  Verbal bedside report received from Rodrigo Cook RN off-going nurse by Ronnell Reynolds. LORI Dixon oncoming nurse. Report included current pt status and condition, recent results, hx of present illness, heart rate and rhythm (NSR), and respiratory status. Pt hypotensive, gave albumin and started 500mL fluid bolus with night RN.

## 2023-01-16 ENCOUNTER — TELEPHONE (OUTPATIENT)
Dept: CARDIOLOGY CLINIC | Age: 73
End: 2023-01-16

## 2023-01-16 ENCOUNTER — APPOINTMENT (OUTPATIENT)
Dept: CT IMAGING | Age: 73
DRG: 871 | End: 2023-01-16
Attending: NURSE PRACTITIONER
Payer: MEDICARE

## 2023-01-16 LAB
ALBUMIN SERPL-MCNC: 2.9 G/DL (ref 3.5–5)
ALBUMIN/GLOB SERPL: 1 (ref 1.1–2.2)
ALP SERPL-CCNC: 172 U/L (ref 45–117)
ALT SERPL-CCNC: 47 U/L (ref 12–78)
ANION GAP SERPL CALC-SCNC: 5 MMOL/L (ref 5–15)
AST SERPL-CCNC: 33 U/L (ref 15–37)
BILIRUB SERPL-MCNC: 0.5 MG/DL (ref 0.2–1)
BNP SERPL-MCNC: 2460 PG/ML
BUN SERPL-MCNC: 25 MG/DL (ref 6–20)
BUN/CREAT SERPL: 19 (ref 12–20)
CALCIUM SERPL-MCNC: 9 MG/DL (ref 8.5–10.1)
CHLORIDE SERPL-SCNC: 106 MMOL/L (ref 97–108)
CO2 SERPL-SCNC: 23 MMOL/L (ref 21–32)
CREAT SERPL-MCNC: 1.32 MG/DL (ref 0.7–1.3)
DIGOXIN SERPL-MCNC: 0.9 NG/ML (ref 0.9–2)
F2 GENE MUT ANL BLD/T: NORMAL
GLOBULIN SER CALC-MCNC: 3 G/DL (ref 2–4)
GLUCOSE BLD STRIP.AUTO-MCNC: 138 MG/DL (ref 65–117)
GLUCOSE BLD STRIP.AUTO-MCNC: 150 MG/DL (ref 65–117)
GLUCOSE BLD STRIP.AUTO-MCNC: 247 MG/DL (ref 65–117)
GLUCOSE BLD STRIP.AUTO-MCNC: 263 MG/DL (ref 65–117)
GLUCOSE SERPL-MCNC: 192 MG/DL (ref 65–100)
MAGNESIUM SERPL-MCNC: 2.2 MG/DL (ref 1.6–2.4)
PHOSPHATE SERPL-MCNC: 3.2 MG/DL (ref 2.6–4.7)
POTASSIUM SERPL-SCNC: 5 MMOL/L (ref 3.5–5.1)
PROCALCITONIN SERPL-MCNC: 0.23 NG/ML
PROT SERPL-MCNC: 5.9 G/DL (ref 6.4–8.2)
SERVICE CMNT-IMP: ABNORMAL
SODIUM SERPL-SCNC: 134 MMOL/L (ref 136–145)

## 2023-01-16 PROCEDURE — 74011250636 HC RX REV CODE- 250/636: Performed by: STUDENT IN AN ORGANIZED HEALTH CARE EDUCATION/TRAINING PROGRAM

## 2023-01-16 PROCEDURE — 99232 SBSQ HOSP IP/OBS MODERATE 35: CPT | Performed by: NURSE PRACTITIONER

## 2023-01-16 PROCEDURE — 83880 ASSAY OF NATRIURETIC PEPTIDE: CPT

## 2023-01-16 PROCEDURE — 82962 GLUCOSE BLOOD TEST: CPT

## 2023-01-16 PROCEDURE — 99233 SBSQ HOSP IP/OBS HIGH 50: CPT | Performed by: NURSE PRACTITIONER

## 2023-01-16 PROCEDURE — 74011250637 HC RX REV CODE- 250/637: Performed by: FAMILY MEDICINE

## 2023-01-16 PROCEDURE — 83735 ASSAY OF MAGNESIUM: CPT

## 2023-01-16 PROCEDURE — 36415 COLL VENOUS BLD VENIPUNCTURE: CPT

## 2023-01-16 PROCEDURE — 97116 GAIT TRAINING THERAPY: CPT

## 2023-01-16 PROCEDURE — 65660000001 HC RM ICU INTERMED STEPDOWN

## 2023-01-16 PROCEDURE — 74011000250 HC RX REV CODE- 250: Performed by: STUDENT IN AN ORGANIZED HEALTH CARE EDUCATION/TRAINING PROGRAM

## 2023-01-16 PROCEDURE — 74011636637 HC RX REV CODE- 636/637: Performed by: CLINICAL NURSE SPECIALIST

## 2023-01-16 PROCEDURE — 74011250637 HC RX REV CODE- 250/637: Performed by: STUDENT IN AN ORGANIZED HEALTH CARE EDUCATION/TRAINING PROGRAM

## 2023-01-16 PROCEDURE — 71250 CT THORAX DX C-: CPT

## 2023-01-16 PROCEDURE — 80162 ASSAY OF DIGOXIN TOTAL: CPT

## 2023-01-16 PROCEDURE — 84100 ASSAY OF PHOSPHORUS: CPT

## 2023-01-16 PROCEDURE — 80053 COMPREHEN METABOLIC PANEL: CPT

## 2023-01-16 PROCEDURE — 84145 PROCALCITONIN (PCT): CPT

## 2023-01-16 PROCEDURE — 74011250637 HC RX REV CODE- 250/637: Performed by: INTERNAL MEDICINE

## 2023-01-16 PROCEDURE — 74011000250 HC RX REV CODE- 250: Performed by: INTERNAL MEDICINE

## 2023-01-16 PROCEDURE — 74011250637 HC RX REV CODE- 250/637: Performed by: NURSE PRACTITIONER

## 2023-01-16 RX ORDER — POLYETHYLENE GLYCOL 3350, SODIUM SULFATE, SODIUM CHLORIDE, POTASSIUM CHLORIDE, SODIUM ASCORBATE, AND ASCORBIC ACID 7.5-2.691G
2 KIT ORAL ONCE
Status: COMPLETED | OUTPATIENT
Start: 2023-01-16 | End: 2023-01-16

## 2023-01-16 RX ADMIN — Medication 6 UNITS: at 11:43

## 2023-01-16 RX ADMIN — HEPARIN SODIUM 5000 UNITS: 5000 INJECTION INTRAVENOUS; SUBCUTANEOUS at 08:32

## 2023-01-16 RX ADMIN — DIGOXIN 0.12 MG: 125 TABLET ORAL at 08:34

## 2023-01-16 RX ADMIN — SODIUM CHLORIDE, PRESERVATIVE FREE 10 ML: 5 INJECTION INTRAVENOUS at 20:34

## 2023-01-16 RX ADMIN — MIRTAZAPINE 7.5 MG: 15 TABLET, FILM COATED ORAL at 20:34

## 2023-01-16 RX ADMIN — Medication: at 08:34

## 2023-01-16 RX ADMIN — Medication 2 UNITS: at 17:17

## 2023-01-16 RX ADMIN — Medication 6 UNITS: at 17:16

## 2023-01-16 RX ADMIN — HEPARIN SODIUM 5000 UNITS: 5000 INJECTION INTRAVENOUS; SUBCUTANEOUS at 20:33

## 2023-01-16 RX ADMIN — Medication: at 17:17

## 2023-01-16 RX ADMIN — IPRATROPIUM BROMIDE 2 SPRAY: 21 SPRAY, METERED NASAL at 08:34

## 2023-01-16 RX ADMIN — QUETIAPINE FUMARATE 50 MG: 25 TABLET ORAL at 20:33

## 2023-01-16 RX ADMIN — IVABRADINE 2.5 MG: 5 TABLET, FILM COATED ORAL at 08:32

## 2023-01-16 RX ADMIN — SODIUM CHLORIDE, PRESERVATIVE FREE 10 ML: 5 INJECTION INTRAVENOUS at 07:27

## 2023-01-16 RX ADMIN — Medication 2 UNITS: at 11:43

## 2023-01-16 RX ADMIN — IVABRADINE 2.5 MG: 5 TABLET, FILM COATED ORAL at 17:16

## 2023-01-16 RX ADMIN — PANTOPRAZOLE SODIUM 40 MG: 40 TABLET, DELAYED RELEASE ORAL at 07:27

## 2023-01-16 RX ADMIN — MIDODRINE HYDROCHLORIDE 10 MG: 5 TABLET ORAL at 08:32

## 2023-01-16 RX ADMIN — FINASTERIDE 5 MG: 5 TABLET, FILM COATED ORAL at 17:16

## 2023-01-16 RX ADMIN — SPIRONOLACTONE 12.5 MG: 25 TABLET ORAL at 08:33

## 2023-01-16 RX ADMIN — ASPIRIN 81 MG: 81 TABLET, COATED ORAL at 08:32

## 2023-01-16 RX ADMIN — INSULIN GLARGINE 6 UNITS: 100 INJECTION, SOLUTION SUBCUTANEOUS at 20:33

## 2023-01-16 RX ADMIN — Medication 6 UNITS: at 08:33

## 2023-01-16 RX ADMIN — ONDANSETRON HYDROCHLORIDE 4 MG: 2 INJECTION, SOLUTION INTRAMUSCULAR; INTRAVENOUS at 21:55

## 2023-01-16 RX ADMIN — SODIUM CHLORIDE, PRESERVATIVE FREE 10 ML: 5 INJECTION INTRAVENOUS at 17:16

## 2023-01-16 RX ADMIN — POLYETHYLENE GLYCOL 3350, SODIUM SULFATE, SODIUM CHLORIDE, POTASSIUM CHLORIDE, ASCORBIC ACID, SODIUM ASCORBATE 2 L: KIT at 20:19

## 2023-01-16 RX ADMIN — MIDODRINE HYDROCHLORIDE 10 MG: 5 TABLET ORAL at 11:42

## 2023-01-16 RX ADMIN — MIDODRINE HYDROCHLORIDE 10 MG: 5 TABLET ORAL at 17:16

## 2023-01-16 NOTE — PROGRESS NOTES
Palliative Medicine Consult  Zeferino: [phone] ([phone])    Patient Name: Hanna Ingram  YOB: 1950    Date of Initial Consult: 1/12/2023  Reason for Consult: LVAD work-up  Requesting Provider: Narciso Lantigua NP  Primary Care Physician: Howie Conte MD     SUMMARY:   Hanna Ingram is a 71 y.o. who was admitted on 12/22/2022 from home with a diagnosis of Sepsis, acute hypoxic respiratory failure 2/2 combination of decompensated congestive heart failure and probable pneumonia as well as TANNER 2/2  bilateral outlet obstruction resulting in urinary retention and bilateral hydronephrosis. Mr. Liss Sevilla had just been discharged from 5 day  hospitalization on 12/16/2022 for CHF exacerbation and before that he was hospitalized  12/5-12/8/22 for NSTEMI and underwent cath. PMH: Cardiomyopathy, CHF, CAD, s/p CABG, NSTEMI x2 stents, deafness (+hearing aide, hears best in left ear), PAD, HTN, HLD, DM 2, CKD. Course of Hospitalization has been prolonged and complicated, Cardiogenic shock,  EF 25 %, requiring dobutamine, work up for LVAD candidacy under way. Renal function improved. Current medical issues leading to Palliative Medicine involvement include: LVAD candidacy work up. PALLIATIVE DIAGNOSES:   Palliative care encounter  End-stage heart failure  LVAD work-up  Weakness, generalized   DNR discussion  Goals of care discussion         PLAN:   Prior to visit completed chart review for updated information. Prior to visit  discussed with patient's nurse Marsha Abebe  RN and Narciso Lantigua NP, both with advanced heart failure team.  I met with Mr. Kendall, his wife, sister-in-law and sister were all at bedside. .  Mr. Kendall denied pain, denied SOB, stated that he feels ok. [address] Discussion- Mr. Kendall stated that he does want to pursue LVAD, therefore will continue the work up process. DNR Discussion- We briefly discussed DNR vs Full Code in current setting of undergoing LVAD work up.   Enoch stated that he would accept CPR, agreed that I could place order for FULL Code  Mr. Kendall understands that should after he receives LVAD, he become sick, decline or develop comorbidity, he can elect to change code status to DNR. Wife present in room at time of this discussion. Mrs. Kendall stated that their children are also aware of the request to change code status to Full Code    Feelings of overwhelm/Concern for caregiver fatigue- Understandably this had been a very long and complicated hospitalization, an emotional roller coaster. Mr. Pricilla Welsh began to become increasing symptomatic, with worsening fatigue and SOB and as a result he has required increased assistance from Mrs Pricilla Welsh and family. Caregiver stress and potential for distress is important to be aware of  They have a very loving and supportive family, they get here as frequently as they are able to, but most live out of state or out of country, so on day to day basis  Mrs. Kendall is her husbands main caregiver. As per my discussion with Mrs. Kendall on Friday, I communicated with  Advanced Heart Failure Team ALEX Gutierrez, she is scheduled to  meet with Mrs. Kendall tomorrow at 6. Palliative team will cc and  how Colonoscopy and EGD goes on Wednesday before we sign off. Please contact me via [employer] with any questions or concerns or if I should follow up. .   Thank you for including the palliative team, we appreciate the opportunity to be of service to Mr. Kendall and his family. Initial consult note routed to primary continuity provider and/or primary health care team members  Communicated plan of care with: Palliative IDT, [facility] [address] Team, Irma RN, Keo Laguna NP, Dr. Ranjan Horta. Adv.  Heart Failure LCSW Jaun Gutierrez      GOALS OF CARE / TREATMENT PREFERENCES:     GOALS OF CARE: LVAD  Patient/Health Care Proxy Stated Goals: Prolong life    TREATMENT PREFERENCES:   Code Status: Full Code    Patient and family's personal goals include: LVAD    Important upcoming milestones or family events: did not address    The patient identifies the following as important for living well:       Advance Care Planning:  [x] The [facility] Interdisciplinary Team has updated the ACP Navigator with [address] and Patient Capacity      Primary Decision Maker: Panchito Alvarez - [phone]    Secondary Decision Maker: Nisa Martinez - [phone]  Advance Care Planning 12/22/2022   Patient's Healthcare Decision Maker is: Legal Next of Saida 70   Primary Decision Maker Name -   Primary Decision [address] Phone Number -   Primary Decision Maker Relationship to Patient -   Confirm Advance Directive None   Patient Would Like to Complete Advance Directive No   Does the patient have other document types -       Medical Interventions: Full interventions       Other:    As far as possible, the palliative care team has discussed with patient / health care proxy about goals of care / treatment preferences for patient. HISTORY:     History obtained from: medical record/patient/family    CHIEF COMPLAINT: Denied     HPI/SUBJECTIVE:    The patient is:   [x] Verbal and participatory  [] Non-participatory due to:   He denied pain, denied feeling short of breath, stated that he feels ok    1/16- Vitals stable, no SOB, IV Milrinone. Midodrine.  Good appetite, plan for colonoscopy/EGD Wednesday, Agreed to change Code Status to Full Code    1/13- did well w/ PT, vital stable, no sob, no hypotension    Clinical Pain Assessment (nonverbal scale for severity on nonverbal patients):   Clinical Pain Assessment  Severity: 0          Duration: for how long has pt been experiencing pain (e.g., 2 days, 1 month, years)  Frequency: how often pain is an issue (e.g., several times per day, once every few days, constant)     FUNCTIONAL ASSESSMENT:     Palliative Performance Scale (PPS):  PPS: 40       PSYCHOSOCIAL/SPIRITUAL SCREENING:     Palliative IDT has assessed this patient for cultural preferences / practices and a referral made as appropriate to needs (Cultural Services, Patient Advocacy, Ethics, etc.)    Any spiritual / Jainism concerns:  [] Yes /  [x] No   If \"Yes\" to discuss with pastoral care during IDT     Does caregiver feel burdened by caring for their loved one:   [x] Yes /  [] No /  [] No Caregiver Present/Available [] No Caregiver [] Pt Lives at [facility]  If \"Yes\" to discuss with social work during [facility] Mrs. Brown 11 am on 1/17/22    Anticipatory grief assessment:   [x] Normal  / [] Maladaptive     If \"Maladaptive\" to discuss with social work during IDT    ESAS Anxiety: Anxiety: 0    ESAS Depression: Depression: 4        REVIEW OF SYSTEMS:     Positive and pertinent negative findings in ROS are noted above in HPI. The following systems were [x] reviewed / [] unable to be reviewed as noted in HPI  Other findings are noted below. Systems: constitutional, ears/nose/mouth/throat, respiratory, gastrointestinal, genitourinary, musculoskeletal, integumentary, neurologic, psychiatric, endocrine. Positive findings noted below. Modified ESAS Completed by: provider   Fatigue: 4 Drowsiness: 0   Depression: 4 Pain: 0   Anxiety: 0 Nausea: 0   Anorexia: 3 Dyspnea: 0     Constipation: No     Stool Occurrence(s): 1        PHYSICAL EXAM:     From RN flowsheet:  Wt Readings from Last 3 Encounters:   01/16/23 116 lb 10 oz (52.9 kg)   12/19/22 129 lb (58.5 kg)   12/16/22 126 lb 8.7 oz (57.4 kg)     Blood pressure (!) 124/37, pulse 87, temperature 98 °F (36.7 °C), resp. rate 18, height 5' 9\" (1.753 m), weight 116 lb 10 oz (52.9 kg), SpO2 98 %.     Pain Scale 1: Numeric (0 - 10)  Pain Intensity 1: 0  Pain Onset 1: post line insertion  Pain Location 1: Neck  Pain Orientation 1: Right  Pain Description 1: Aching  Pain Intervention(s) 1: Medication (see MAR)  Last bowel movement, if known:     Constitutional: Appears older than stated age, thinner,  Eyes: pupils equal, anicteric  ENMT: no nasal discharge, moist mucous membranes  Cardiovascular: regular rhythm, distal pulses intact  Respiratory: breathing not labored, symmetric  Gastrointestinal: soft non-tender, +bowel sounds  Musculoskeletal: no deformity, no tenderness to palpation  Skin: warm, dry  Neurologic: following commands, moving all extremities  Psychiatric: full affect, no hallucinations  Other:       HISTORY:     Active Problems:    Sepsis ([facility] (24/08/2809)      Systolic CHF, acute on chronic ([facility] (12/29/2022)      Receiving inotropic medication (12/29/2022)    Past Medical History:   Diagnosis Date    CAD (coronary artery disease) 11/10/2016    NSTEMI & 2 stents    Deafness 10/28/2012    DM (diabetes mellitus) ([facility]     Elevated cholesterol     Hypertension     NSTEMI (non-ST elevated myocardial infarction) ([facility] 11/10/2016      Past Surgical History:   Procedure Laterality Date    COLONOSCOPY N/A 6/28/2018    COLONOSCOPY performed by Roni Song MD at [facility] ENDOSCOPY    HX APPENDECTOMY      [id number] [facility]  11/11/2016    2 stents      Family History   Problem Relation Age of Onset    Heart Disease Father     Heart Attack Father     Hypertension Mother     Elevated Lipids Brother     Elevated Lipids Brother     No Known Problems Sister     Elevated Lipids Brother     No Known Problems Son     No Known Problems Daughter     Anesth Problems Neg Hx       History reviewed, no pertinent family history. Social History     Tobacco Use    Smoking status: Never     Passive exposure: Never    Smokeless tobacco: Never   Substance Use Topics    Alcohol use:  Yes     Alcohol/week: 2.0 standard drinks     Types: 1 Cans of beer, 1 Shots of liquor per week     Comment: rarely     No Known Allergies   Current Facility-Administered Medications   Medication Dose Route Frequency    peg 3350-Electrolytes-Vit C (MOVIPREP) oral solution 2 L  2 L Oral ONCE    digoxin (LANOXIN) tablet 0.125 mg  0.125 mg Oral EVERY OTHER DAY    milrinone (PRIMACOR) 20 MG/100 ML D5W infusion  0.2 mcg/kg/min IntraVENous CONTINUOUS    midodrine (PROAMATINE) tablet 10 mg  10 mg Oral TID WITH MEALS    finasteride (PROSCAR) tablet 5 mg  5 mg Oral DAILY WITH DINNER    insulin lispro (HUMALOG) injection 6 Units  6 Units SubCUTAneous TID WITH MEALS    ivabradine (CORLANOR) tablet 2.5 mg  2.5 mg Oral BID WITH MEALS    insulin glargine (LANTUS) injection 6 Units  6 Units SubCUTAneous QHS    insulin lispro (HUMALOG) injection   SubCUTAneous AC&HS    dextrose 10 % infusion 0-250 mL  0-250 mL IntraVENous PRN    spironolactone (ALDACTONE) tablet 12.5 mg  12.5 mg Oral DAILY    melatonin tablet 3 mg  3 mg Oral QHS PRN    [Held by provider] empagliflozin (JARDIANCE) tablet 10 mg  10 mg Oral DAILY    dextrose 10 % infusion 0-250 mL  0-250 mL IntraVENous PRN    albuterol-ipratropium (DUO-NEB) 2.5 MG-0.5 MG/3 ML  3 mL Nebulization Q6H PRN    mirtazapine (REMERON) tablet 7.5 mg  7.5 mg Oral QHS    pantoprazole (PROTONIX) tablet 40 mg  40 mg Oral ACB    heparin (porcine) injection 5,000 Units  5,000 Units SubCUTAneous Q12H    QUEtiapine (SEROquel) tablet 50 mg  50 mg Oral QHS    lidocaine 4 % patch 1 Patch  1 Patch TransDERmal Q24H    balsam peru-castor oiL (VENELEX) ointment   Topical BID    alteplase (CATHFLO) 1 mg in sterile water (preservative free) 1 mL injection  1 mg InterCATHeter PRN    bacitracin 500 unit/gram packet 1 Packet  1 Packet Topical PRN    glucose chewable tablet 16 g  4 Tablet Oral PRN    glucagon (GLUCAGEN) injection 1 mg  1 mg IntraMUSCular PRN    senna-docusate (PERICOLACE) 8.6-50 mg per tablet 1 Tablet  1 Tablet Oral BID PRN    bisacodyL (DULCOLAX) suppository 10 mg  10 mg Rectal QHS PRN    sodium chloride (NS) flush 5-40 mL  5-40 mL IntraVENous Q8H    sodium chloride (NS) flush 5-40 mL  5-40 mL IntraVENous PRN    acetaminophen (TYLENOL) tablet 650 mg  650 mg Oral Q6H PRN    Or    acetaminophen (TYLENOL) suppository 650 mg  650 mg Rectal Q6H PRN    polyethylene glycol (MIRALAX) packet 17 g  17 g Oral DAILY PRN    ondansetron (ZOFRAN ODT) tablet 4 mg  4 mg Oral Q8H PRN    Or    ondansetron (ZOFRAN) injection 4 mg  4 mg IntraVENous Q6H PRN    aspirin delayed-release tablet 81 mg  81 mg Oral DAILY    ipratropium (ATROVENT) 21 mcg (0.03 %) nasal spray 2 Spray  2 Spray Both Nostrils Q12H    sodium chloride (NS) flush 5-40 mL  5-40 mL IntraVENous PRN          LAB AND IMAGING FINDINGS:     Lab Results   Component Value Date/Time    WBC 5.8 01/15/2023 02:04 AM    HGB 8.7 (L) 01/15/2023 02:04 AM    PLATELET 086 13/44/7231 02:04 AM     Lab Results   Component Value Date/Time    Sodium 134 (L) 01/16/2023 12:54 AM    Potassium 5.0 01/16/2023 12:54 AM    Chloride 106 01/16/2023 12:54 AM    CO2 23 01/16/2023 12:54 AM    BUN 25 (H) 01/16/2023 12:54 AM    Creatinine 1.32 (H) 01/16/2023 12:54 AM    Calcium 9.0 01/16/2023 12:54 AM    Magnesium 2.2 01/16/2023 12:54 AM    Phosphorus 3.2 01/16/2023 12:54 AM      Lab Results   Component Value Date/Time    Alk.  phosphatase 172 (H) 01/16/2023 12:54 AM    Protein, total 5.9 (L) 01/16/2023 12:54 AM    Albumin 2.9 (L) 01/16/2023 12:54 AM    Globulin 3.0 01/16/2023 12:54 AM     Lab Results   Component Value Date/Time    INR 1.0 01/11/2023 06:14 PM    Prothrombin time 10.8 01/11/2023 06:14 PM    aPTT 27.9 01/12/2023 05:00 AM      Lab Results   Component Value Date/Time    Iron 81 01/04/2023 02:31 AM    TIBC 277 01/04/2023 02:31 AM    Iron % saturation 29 01/04/2023 02:31 AM    Ferritin 463 (H) 01/04/2023 02:31 AM      No results found for: PH, PCO2, PO2  No components found for: [place]   Lab Results   Component Value Date/Time    CK 87 01/04/2023 02:31 AM    CK - MB 5.3 (H) 11/10/2016 03:44 PM                Total time: 25min  Counseling / coordination time, spent as noted above: 20minutes  > 50% counseling / coordination?: yes    Prolonged service was provided for  []30 min   []75 min in face to face time in the presence of the patient, spent as noted above. Time Start:   Time End:   Note: this can only be billed with 44756 (initial) or 38658 (follow up). If multiple start / stop times, list each separately.

## 2023-01-16 NOTE — TELEPHONE ENCOUNTER
Left message with patient's wife with call back number. Introduced self and requested call back to coordinate time to review LVAD and answer questions.

## 2023-01-16 NOTE — PROGRESS NOTES
Problem: Mobility Impaired (Adult and Pediatric)  Goal: *Therapy Goal (Edit Goal, Insert Text)  Description: FUNCTIONAL STATUS PRIOR TO ADMISSION: Patient was independent and active without use of DME. Patient is currently driving. Patient is independent with ADLs and IADLs. HOME SUPPORT PRIOR TO ADMISSION: The patient lived with his wife, but did not require assist.    Physical Therapy Goals  Continued 1/4/2023; remain appropriate 1/11/23  1. Patient will move from supine to sit and sit to supine, scoot up and down, and roll side to side in bed with modified independence within 7 day(s). 2.  Patient will transfer from bed to chair and chair to bed with modified independence using the least restrictive device within 7 day(s). 3.  Patient will perform sit to stand with modified independence within 7 day(s). 4.  Patient will ambulate with modified independence for 150 feet with the least restrictive device within 7 day(s). 5.  Patient will ascend/descend 13 stairs with single handrail(s) with modified independence within 7 day(s). Initiated 12/24/2022  1. Patient will move from supine to sit and sit to supine, scoot up and down, and roll side to side in bed with modified independence within 7 day(s). 2.  Patient will transfer from bed to chair and chair to bed with modified independence using the least restrictive device within 7 day(s). 3.  Patient will perform sit to stand with modified independence within 7 day(s). 4.  Patient will ambulate with modified independence for 150 feet with the least restrictive device within 7 day(s). 5.  Patient will ascend/descend 13 stairs with single handrail(s) with modified independence within 7 day(s).     Outcome: Progressing Towards Goal      PHYSICAL THERAPY TREATMENT  Patient: Ana Torres (76 y.o. male)  Date: 1/16/2023  Diagnosis: Sepsis ([facility] [A41.9] <principal problem not specified>  Procedure(s) (LRB):  RIGHT HEART CATH (N/A) 7 Days Post-Op  Precautions: Fall  Chart, physical therapy assessment, plan of care and goals were reviewed. ASSESSMENT  Patient continues with skilled PT services and is progressing towards goals. Pt was able to increase gait tolerance. Pt had no LOB. Pt had good use of rollator. Pt can ambulate with nursing to continue progression . Current Level of Function Impacting Discharge (mobility/balance): CGA    Other factors to consider for discharge:          PLAN :  Patient continues to benefit from skilled intervention to address the above impairments. Continue treatment per established plan of care. to address goals. Recommendation for discharge: (in order for the patient to meet his/her long term goals)  Physical therapy at least 2 days/week in the home AND ensure assist and/or supervision for safety with mobility as needed     This discharge recommendation:  Has not yet been discussed the attending provider and/or case management    IF patient discharges home will need the following DME: rollator has been delivered for discharge       SUBJECTIVE:   Patient stated it feels good.     OBJECTIVE DATA SUMMARY:   Critical Behavior:  Neurologic State: Alert  Orientation Level: Oriented X4  Cognition: Appropriate decision making, Follows commands  Safety/Judgement: Awareness of environment, Fall prevention, Home safety  Functional Mobility Training:  Bed Mobility:                    Transfers:                                   Balance:  Sitting: Intact  Standing: Intact; With support  Ambulation/Gait Training:  Distance (ft):  ([id number])  Assistive Device: Gait belt;Walker, rollator  Ambulation - Level of Assistance: Stand-by assistance;Contact guard assistance        Gait Abnormalities: Decreased step clearance              Speed/Bette: Slow  Step Length: Right shortened;Left shortened                    Stairs:               Therapeutic Exercises:     Pain Rating:  none    Activity Tolerance:   Improving     After treatment patient left in no apparent distress:   Sitting in chair, Call bell within reach, and Caregiver / family present    COMMUNICATION/COLLABORATION:   The patients plan of care was discussed with: Registered nurse.      Hill Marshall PTA   Time Calculation: 13 mins

## 2023-01-16 NOTE — PROCEDURES
Findings  1. Low filling pressures  2. Low normal cardiac output/cardiac index. Cardiac index is 2 L/min/m²    Access right internal jugular vein no issues    Recommendations  1. May hydrate if blood pressures are marginal  2.   Continue guideline directed medical therapy if tolerated well

## 2023-01-16 NOTE — PROGRESS NOTES
[facility] in [place], [place]       Met with patient and his wife for LVAD education. Introduced self and role in education during the evaluation process. Educated on evaluation process. Discussed expected length of time inpatient after implant. Patient's wife asking about if additional help is needed, educated family that home health, inpatient rehab and/or PT/OT are generally available if indicated. Discussed lifestyle restrictions including, no swimming, keeping equipment secure ect. Reviewed the following:     LVAD terms and functions- discussed each component of the LVAD system. Sterile dressing change- Discussed need for dressing change daily for the first 30 days and 3 times a week after, unless otherwise indicated by LVAD team. Informed family dressing change education should be set up by family with inpatient nurses for daily education. Time given to ask questions. Patient and family had no further questions at this time. Informed them will reach out to set up additional training. They verbalized understanding and had no further questions.      Boris Duckworth RN  VAD Coordinator

## 2023-01-16 NOTE — PROGRESS NOTES
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, LVEF 25-30%, stage D, NYHA class IIIB/IV; initiated on home milrinone gtt as bridge to completion of LVAD workup  Most likely etiology of cardiomyopathy: CAD +/- TRISTAN +/- inappropriate sinus tach +/- undergoing workup   Tentative discharge home after scopes, probably end of the week      RECOMMENDATIONS:  Continue milrinone  0.2mcg/kg/min unable to uptitrate due to HR and BP  Cannot tolerate beta-blockers due to hypotension  Cannot tolerate ARB/ARNi due to hypotension  Continue Midodrine 10mg TID for now, will try to wean if BP remains stable  Of note Flomax was held and BP responded well   Continue spironolactone 12.5mg daily  Potassium replacement on hold- if K continues to rise will need to stop spironolactone   Hold jardiance 10mg daily as it may contribute to IVVD  Continue corlanor 2.5mg twice daily, goal HR 70-80s  Decrease digoxin 0.125mcg to every other day, check digoxin level daily (today 0.9 goal 0.7-1.2)  Continue to hold diuretics  Continue baby aspirin   Ok to stop Plavix per Dr. Lizzy Joseph on GI Scopes on   Inpatient sleep medicine consult pending (high risk for TRISTAN)  Appreciate urology recs, will need OP follow up   Changed code status to   Physical therapy  Plan on DC with home inotropic support as bridge to LVAD  VAD evaluate in progress, will need to do dental, urology, sleep medicine as OP  Ambulate daily     All other care per primary team, hopefully home end of the week     IMPRESSION:  Acute on chronic combined systolic/diastolic  heart failure  Stage D, NYHA class IIIB/IV symptoms  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, LVEF 25-30% and 23% (by cMRI 1/3/23)  Cardiac MRI suggestive of ischemic cardiomyopathy  PYP equivocal  Coronary artery disease  CAD s/p CABG x 2: further disease best managed medically due to small vessel size   Peripheral arterial disease  Bilateral hydronephrosis s/p donnelly  Cardiac risk factors:  HTN  HL  TRISTAN, STOP-BANG 4  DM2  CKD, stage 3  MOCA from 1/4/22 17/30, consistent with mild cognitive impairment      INTERVAL EVENTS:  Ambulating well, 3 laps  Improved appetite   Afebrile  -120s, HR 70-80s  I/O net neg 1.7 liters  Weight 116lbs   Cr 1.32  K 5  Alb 2.9  T Bili 0.5  NT stable at 2400       LIFE GOALS:  Lifestyle goals reviewed with the patient. Patient's personal goals include: TBD  Important upcoming milestones or family events: TBD  The patient identifies the following as important for living well: TBD     Patient assessed. High suspicion of sleep apnea due to the following reasons. Will refer for sleep evaluation. [x] Heart Failure  [] Hypertention  [x] Atrial Fibrillation  [] BMI > 30  [] History of stroke  [] Diabetes  [x] Heavy snoring  [] Witnessed apnea  [] Hypoxemia                    HPI:  71 y.o. male who was admitted via ED for worsening SOB, poor appetite, orthopnea and fatigue. Pmhx of CAD s/p CABG, PAD, DM 2, recent admission for HFmrEF earlier this month. Serial TTEs show progressive decline in EF since 3/2021 with the most recent EF at 25-30%. Recent LHC shows diffuse CAD and no interventions indicated. Patient had Corean Bellis recently and there is some thought to myocarditis or other etiology causing worsening HF. [facility] was consulted for further evaluation and management of HFrEF. CARDIAC IMAGING:  Echo 1/9/23   Left Ventricle: Severely reduced left ventricular systolic function with a visually estimated EF of 25 - 30%. Left ventricle size is normal. Mildly increased wall thickness.      Echo 12/26/22    Left Ventricle: Severely reduced left ventricular systolic function with a visually estimated EF of 25 - 30%. Left ventricle size is normal. Normal wall thickness. There are regional wall motion abnormalities. Grade II diastolic dysfunction with increased LAP. Right Ventricle: Moderately reduced systolic function. TAPSE is abnormal. TAPSE is 1.1 cm. Aortic Valve: Mild stenosis of the aortic valve. AV peak gradient is 13 mmHg. AV peak velocity is 1.8 m/s. Mitral Valve: Not well visualized. Moderate annular calcification at the posterior leaflet of the mitral valve. Mild to moderate regurgitation. Tricuspid Valve: Mildly elevated RVSP. Left Atrium: Left atrium is moderately dilated. 12/8/22    Left Ventricle: Moderately reduced left ventricular systolic function with a visually estimated EF of 35 - 40%. Severe hypokinesis of the following segments: mid anteroseptal, apical anterior, apical septal, apical inferior and apical lateral. Severe hypokinesis of the apex. Mitral Valve: Severely thickened leaflet, at the anterior and posterior leaflets. Severely calcified leaflet, at the anterior and posterior leaflets. Mild annular calcification of the mitral valve. Moderate regurgitation. Left Atrium: Left atrium is mildly dilated. Contrast used: Definity. limited study     EKG 12/22/22 ST, Biatria enlargement, marked ST abnormality     [facility] 12/6/22  1. Normal LVEDP  2. Severe native multivessel coronary artery disease  3. Patent LIMA to LAD and vein graft to distal RCA  4. Recurrent ISR in OM1 stent with now 60 to 70% restenosis  5. Recoil of left main and circumflex stent with now recurrent 40 to 50% stenosis. 6.  Progression of ostial left main disease now to about 60% stenosis  7. Progression of disease in jailed first marginal branch now with diffuse 90% stenosis  8.   High-grade stenosis in the mid to distal right potential femoral artery treated with 6 x 40 mm impact drug-coated balloon angioplasty to reduce the stenosis to less than 40%     NST        HEMODYNAMICS:  RHC 1/9/23  PA 20/9, RA 3, PCWP 8, CI 1.8     CPEST too ill   6MW 300 feet       PHYSICAL EXAM:  Visit Vitals  BP (!) 113/40 (BP 1 Location: Left arm, BP Patient Position: At rest)   Pulse 82   Temp 98 °F (36.7 °C)   Resp 18   Ht 5' 9\" (1.753 m)   Wt 116 lb 10 oz (52.9 kg)   SpO2 98%   BMI 17.22 kg/m²     General: Patient is well developed, well-nourished in no acute distress  HEENT: Unremarkable   Neck: Supple. No evidence of thyroid enlargements or lymphadenopathy. JVD: Cannot be appreciated   Lungs: Breath sounds are equal and clear bilaterally. No wheezes, rhonchi, or rales. Heart: Regular rate and rhythm with normal S1 and S2. No murmurs, gallops or rubs. Abdomen: Soft, no mass or tenderness. No organomegaly or hernia. Bowel sounds present. Genitourinary and rectal: deferred  Extremities: No cyanosis, clubbing, or edema. Neurologic: No focal sensory or motor deficits are noted. Grossly intact. Psychiatric: Awake, alert an doriented x 3. Appropriate mood and affect. Skin: Warm, dry and well perfused. REVIEW OF SYSTEMS:  General: Denies fever. Ear, nose and throat: Denies difficulty hearing, sinus problems, nosebleeds  Cardiovascular: see above in the interval history  Respiratory: Denies cough, wheezing, sputum production, hemoptysis.   Gastrointestinal: Denies heartburn, constipation, diarrhea, abdominal pain, nausea, blood in stool  Kidney and bladder: Denies painful urination, frequent urination  Musculoskeletal: Denies joint pain, muscle weakness  Skin and hair: Denies change in existing skin lesions    PAST MEDICAL HISTORY:  Past Medical History:   Diagnosis Date    CAD (coronary artery disease) 11/10/2016    NSTEMI & 2 stents    Deafness 10/28/2012    DM (diabetes mellitus) ([facility] [address]     Elevated cholesterol     Hypertension     NSTEMI (non-ST elevated myocardial infarction) ([facility] [address] 11/10/2016 PAST SURGICAL HISTORY:  Past Surgical History:   Procedure Laterality Date    COLONOSCOPY N/A 6/28/2018    COLONOSCOPY performed by Russ Duvall MD at [address] [place] [address] ENDOSCOPY    [address]  11/11/2016    2 stents       FAMILY HISTORY:  Family History   Problem Relation Age of Onset    Heart Disease Father     Heart Attack Father     Hypertension Mother     Elevated Lipids Brother     Elevated Lipids Brother     No Known Problems Sister     Elevated Lipids Brother     No Known Problems Son     No Known Problems Daughter     Anesth Problems Neg Hx        SOCIAL HISTORY:  Social History     Socioeconomic History    Marital status:    Tobacco Use    Smoking status: Never     Passive exposure: Never    Smokeless tobacco: Never   Vaping Use    Vaping Use: Never used   Substance and Sexual Activity    Alcohol use: Yes     Alcohol/week: 2.0 standard drinks     Types: 1 Cans of beer, 1 Shots of liquor per week     Comment: rarely    Drug use: No    Sexual activity: Yes     Social Determinants of Health     Financial Resource Strain: Medium Risk    Difficulty of Paying Living Expenses: Somewhat hard   Food Insecurity: Food Insecurity Present    Worried About Running Out of Food in the Last Year: Never true    Ran Out of Food in the Last Year: Often true       LABORATORY RESULTS:     Labs Latest Ref Rng & Units 1/16/2023 1/15/2023 1/14/2023 1/13/2023 1/12/2023 1/11/2023 1/10/2023   WBC 4.1 - 11.1 K/uL - 5.8 - 5.7 - 6.6 -   RBC 4.10 - 5.70 M/uL - 3.66(L) - 3.55(L) - 3.65(L) -   Hemoglobin 12.1 - 17.0 g/dL - 8. 7(L) - 8. 5(L) - 8. 5(L) -   Hematocrit 36.6 - 50.3 % - 30. 3(L) - 28. 5(L) - 29. 4(L) -   MCV 80.0 - 99.0 FL - 82.8 - 80.3 - 80.5 -   Platelets 833 - 987 K/uL - 258 - 264 - 276 -   Lymphocytes 12 - 49 % - - - - - - -   Monocytes 5 - 13 % - - - - - - -   Eosinophils 0 - 7 % - - - - - - -   Basophils 0 - 1 % - - - - - - -   Albumin 3.5 - 5.0 g/dL 2. 9(L) 2. 8(L) 2. 9(L) 3.0(L) 3. 3(L) 3.0(L) 2. 9(L)   Calcium 8.5 - 10.1 MG/DL 9.0 9.1 9.2 9.4 9.7 9.2 9.3   Glucose 65 - 100 mg/dL 192(H) 224(H) 162(H) 106(H) 118(H) 146(H) 77   BUN 6 - 20 MG/DL 25(H) 28(H) 29(H) 29(H) 27(H) 27(H) 30(H)   Creatinine 0.70 - 1.30 MG/DL 1.32(H) 1.36(H) 1.29 1.28 1.29 1.20 1.14   Sodium 136 - 145 mmol/L 134(L) 135(L) 136 135(L) 135(L) 136 136   Potassium 3.5 - 5.1 mmol/L 5.0 4.8 4.5 4.6 4.5 4.6 4.3   TSH 0.36 - 3.74 uIU/mL - - - - - - -   PSA 0.01 - 4.0 ng/mL - - - - - - -   LDH 85 - 241 U/L - - - - 189 - -   Some recent data might be hidden     Lab Results   Component Value Date/Time    TSH 2.12 12/27/2022 02:36 PM    TSH 4.80 (H) 12/06/2022 03:53 AM    TSH 5.39 (H) 10/12/2022 09:10 AM    TSH 3.53 02/03/2022 11:47 AM    TSH 5.790 (H) 11/21/2019 04:45 PM    TSH 3.08 06/22/2018 01:53 PM    TSH 4.250 05/26/2015 09:43 AM       ALLERGY:  No Known Allergies     CURRENT MEDICATIONS:    Current Facility-Administered Medications:     digoxin (LANOXIN) tablet 0.125 mg, 0.125 mg, Oral, EVERY OTHER DAY, Geoffrey Keyes MD, 0.125 mg at 01/16/23 0834    milrinone (PRIMACOR) 20 MG/100 ML D5W infusion, 0.2 mcg/kg/min, IntraVENous, CONTINUOUS, Geoffrey Keyes MD, Last Rate: 3.1 mL/hr at 01/16/23 0835, 0.2 mcg/kg/min at 01/16/23 0835    midodrine (PROAMATINE) tablet 10 mg, 10 mg, Oral, TID WITH MEALS, Elmo Rendon MD, 10 mg at 01/16/23 2929    finasteride (PROSCAR) tablet 5 mg, 5 mg, Oral, DAILY WITH DINNER, Elmo Rendon MD, 5 mg at 01/15/23 1736    insulin lispro (HUMALOG) injection 6 Units, 6 Units, SubCUTAneous, TID WITH MEALS, Cathy Sheldon, CNS, 6 Units at 01/16/23 0833    ivabradine (CORLANOR) tablet 2.5 mg, 2.5 mg, Oral, BID WITH MEALS, Lizette Linton, NP, 2.5 mg at 01/16/23 0832    insulin glargine (LANTUS) injection 6 Units, 6 Units, SubCUTAneous, QHS, Cathy Sheldon CNS, 6 Units at 01/15/23 2123    insulin lispro (HUMALOG) injection, , SubCUTAneous, AC&HS, Cathy Sheldon CNS, 1 Units at 01/15/23 2123 dextrose 10 % infusion 0-250 mL, 0-250 mL, IntraVENous, PRN, Cathy Sheldon, CNS    spironolactone (ALDACTONE) tablet 12.5 mg, 12.5 mg, Oral, DAILY, Lizette Linton NP, 12.5 mg at 01/16/23 1671    melatonin tablet 3 mg, 3 mg, Oral, QHS PRN, Modesta Okeefe NP, 3 mg at 01/15/23 2116    [Held by provider] empagliflozin (JARDIANCE) tablet 10 mg, 10 mg, Oral, DAILY, Bobbette Sicard, MD, 10 mg at 01/14/23 0929    dextrose 10 % infusion 0-250 mL, 0-250 mL, IntraVENous, PRN, Cathy Sheldon, CNS    albuterol-ipratropium (DUO-NEB) 2.5 MG-0.5 MG/3 ML, 3 mL, Nebulization, Q6H PRN, Lashawn Escalera MD    mirtazapine (REMERON) tablet 7.5 mg, 7.5 mg, Oral, QHS, Whitney Evans, [address], MD, 7.5 mg at 01/15/23 2117    [Held by provider] potassium chloride SR (KLOR-CON 10) tablet 40 mEq, 40 mEq, Oral, BID, Misty MOLINA MD, 40 mEq at 01/03/23 0838    pantoprazole (PROTONIX) tablet 40 mg, 40 mg, Oral, ACB, Vitaliy Kenyan, DO, 40 mg at 01/16/23 0727    heparin (porcine) injection 5,000 Units, 5,000 Units, SubCUTAneous, Q12H, Vitaliy Kenyan, DO, 5,000 Units at 01/16/23 2098    QUEtiapine (SEROquel) tablet 50 mg, 50 mg, Oral, QHS, David Aguilar G, DO, 50 mg at 01/15/23 2116    lidocaine 4 % patch 1 Patch, 1 Patch, TransDERmal, Q24H, Marcelline Merlin, MD, 1 Patch at 01/15/23 1736    balsam peru-castor oiL (VENELEX) ointment, , Topical, BID, Tiff Cedeno MD, Given at 01/16/23 0834    alteplase (CATHFLO) 1 mg in sterile water (preservative free) 1 mL injection, 1 mg, InterCATHeter, PRN, Marcelline Merlin, MD    bacitracin 500 unit/gram packet 1 Packet, 1 Packet, Topical, PRN, Misty MOLINA MD    glucose chewable tablet 16 g, 4 Tablet, Oral, PRN, Misty MOLINA MD    glucagon (GLUCAGEN) injection 1 mg, 1 mg, IntraMUSCular, PRN, Marcelline Merlin, MD    senna-docusate (PERICOLACE) 8.6-50 mg per tablet 1 Tablet, 1 Tablet, Oral, BID PRN, Marcelline Merlin, MD, 1 Tablet at 12/26/22 8174 bisacodyL (DULCOLAX) suppository 10 mg, 10 mg, Rectal, QHS PRN, Walker Quinonez MD    sodium chloride (NS) flush 5-40 mL, 5-40 mL, IntraVENous, Q8H, Walker Aponte MD, 10 mL at 01/16/23 0727    sodium chloride (NS) flush 5-40 mL, 5-40 mL, IntraVENous, PRN, Juanito Mendoza MD, 10 mL at 12/28/22 0845    acetaminophen (TYLENOL) tablet 650 mg, 650 mg, Oral, Q6H PRN, 650 mg at 01/15/23 2116 **OR** acetaminophen (TYLENOL) suppository 650 mg, 650 mg, Rectal, Q6H PRN, Walker Quinonez MD    polyethylene glycol (MIRALAX) packet 17 g, 17 g, Oral, DAILY PRN, Juanito Mendoza MD, 17 g at 12/26/22 0649    ondansetron (ZOFRAN ODT) tablet 4 mg, 4 mg, Oral, Q8H PRN **OR** ondansetron (ZOFRAN) injection 4 mg, 4 mg, IntraVENous, Q6H PRN, Emma MOLINA MD, 4 mg at 12/24/22 0849    aspirin delayed-release tablet 81 mg, 81 mg, Oral, DAILY, Irina Montanez MD, 81 mg at 01/16/23 0832    ipratropium (ATROVENT) 21 mcg (0.03 %) nasal spray 2 Spray, 2 Spray, Both Nostrils, Q12H, Ranjit Pearson MD, 2 [place] at 01/16/23 0834    sodium chloride (NS) flush 5-40 mL, 5-40 mL, IntraVENous, PRN, Juanito Mendoza MD    PATIENT CARE TEAM:  Patient Care Team:  Peña Smith MD as PCP - General (Family Medicine)  Peña Smith MD as PCP - [facility], MD (Cardiovascular Disease Physician)  Lady Luz Elena MD (Gastroenterology)  Gianfranco Laureano MD (Cardiothoracic Surgery)  Renetta Lucas MD (Cardiovascular Disease Physician)  Ramesh Wong MD (Nephrology)  Guzman Walsh MD (Pulmonary Disease)     Thank you for allowing me to participate in this patient's care.     Jolene Preston NP   [address] [place] [address], [address]  Phone: [phone]

## 2023-01-16 NOTE — PROGRESS NOTES
[facility] Adult  Hospitalist Group                                                                                          Hospitalist Progress Note  Paris Bledsoe MD  Answering service: [phone] OR 36 from in house phone        Date of Service:  1/15/2023  NAME:  Anna Webber  :  1950  MRN:  [id number]      Admission Summary:   Anna Webber is a 71 y.o. male who presents with progressively worsening shortness of breath for past several days. It has gotten worse to the point that he can only ambulate a few steps due to severe dyspnea and near syncope. Patient also noted abdominal distention which is increasing. Patient with known history of cardiomyopathy and recently admitted for CHF exacerbation a week ago. On presentation to the ER, chest x-ray shows diffuse airspace disease atypical infection versus edema. Also patient was noted to have elevated troponin and BNP. Patient was further evaluated by CT abdomen and pelvis which shows massively distended bladder with some bilateral hydronephrosis, subsequently Vasquez was placed. Patient also with significant leukocytosis as well as tachycardic and elevated lactic acid level and subsequently code sepsis was called. Hospitalist service requested admit the patient for further evaluation management.      The patient denies any headache, blurry vision, sore throat, trouble swallowing, trouble with speech, chest pain, SOB, cough, fever, chills, N/V/D, abd pain, urinary symptoms, constipation, recent travels, sick contacts, focal or generalized neurological symptoms, falls, injuries, rashes, contact with COVID-19 diagnosed patients, hematemesis, melena, hemoptysis, hematuria, rashes, denies starting any new medications and denies any other concerns or problems besides as mentioned above       Interval history / Subjective:     Patient seen and examined , reviewed his vitals,labs,careplan  Plans for DC home with PICC and home IV inotropes  Hematuria resolved  urology rec noted     Assessment & Plan:     Acute on chronic systolic congestive heart failure   Cardiogenic shock due to above - off dbutamine on milrinone  CAD   S/p CABG   Ischemic Cardiomyopathy    hypotension today- held duretics and 500ns bolus given w albumin  Appreciate help from Heart failure team.   S/p RHC - with normal to low filling pressures but mildly reduced CI. PICC line placed for home milrinone     TANNER on CKD stage III   Improving - stable cr  Monitor daily weight , I/O   holding diuretics today     GERD -chronic   No change in current Rx. Continue PPI     Depression-stable  Hospital delirium  -resolved  Continue Seroquel as is QHS - at 50mg   Continue Remeron as was PTA      T2DM -on Insulin   Controlled Lauri diet   Insulin NPH Six units BID      BPH -with urinary retention  Continue Tamsulosin/proscar and donnelly on DC  Hematuria resolved urology recs noted with follow up appt     PAD   S/p Right SFA PTCA -followed by Dr. Janeane Bernheim   Normal MARIANELA     Body mass index is 17.39 kg/m². - severe protein lauri malnutrition         Code status: DNR  Prophylaxis: heparin   Care Plan discussed with: pt, RN   Anticipated Disposition: greater than 48 hours,   Being worked up and considered for possible [address] [facility] [address] Problems  Date Reviewed: 12/5/2022            Codes Class Noted POA    Systolic CHF, acute on chronic ([facility] ICD-10-CM: I50.23  ICD-9-CM: 428.23, 428.0  12/29/2022 Yes        Receiving inotropic medication ICD-10-CM: Z79.899  ICD-9-CM: V49.89  12/29/2022 Unknown        Sepsis ([facility] ICD-10-CM: A41.9  ICD-9-CM: 038.9, 995.91  12/22/2022 Unknown       Review of Systems:   A comprehensive review of systems was negative except for that written in the HPI. Vital Signs:    Last 24hrs VS reviewed since prior progress note.  Most recent are:  Visit Vitals  BP (!) 128/41 (BP 1 Location: Left upper arm, BP Patient Position: At rest;Supine;Reclining)   Pulse 88   Temp 98.5 °F (36.9 °C)   Resp 18   Ht 5' 9\" (1.753 m)   Wt 52 kg (114 lb 10.2 oz)   SpO2 99%   BMI 16.93 kg/m²     Patient Vitals for the past 24 hrs:   Temp Pulse Resp BP SpO2   01/15/23 2152 -- 88 -- -- --   01/15/23 1957 -- 86 -- -- --   01/15/23 1908 98.5 °F (36.9 °C) 85 18 (!) 128/41 99 %   01/15/23 1800 -- 85 -- -- --   01/15/23 1600 99 °F (37.2 °C) 86 18 (!) 127/53 99 %   01/15/23 1400 -- 82 -- -- --   01/15/23 1200 -- 79 -- -- --   01/15/23 1144 97.8 °F (36.6 °C) 79 16 (!) 97/48 100 %   01/15/23 1000 -- 80 -- -- --   01/15/23 0840 -- 82 -- -- 100 %   01/15/23 0722 97.8 °F (36.6 °C) 84 16 (!) 85/38 97 %   01/15/23 0600 -- 89 -- -- --   01/15/23 0359 -- 88 -- -- --   01/15/23 0326 98.6 °F (37 °C) 86 18 (!) 96/42 96 %   01/15/23 0200 -- 92 -- -- --   01/14/23 2354 98.9 °F (37.2 °C) 83 16 (!) 109/38 96 %          Intake/Output Summary (Last 24 hours) at 1/15/2023 2247  Last data filed at 1/15/2023 2015  Gross per 24 hour   Intake 990.16 ml   Output 2750 ml   Net -1759.84 ml          Physical Examination:     I had a face to face encounter with this patient and independently examined them on 1/15/2023 as outlined below:          Constitutional:  No acute distress, , pleasant, frail elderly male   ENT:  Oral mucosa dry  EOMI    Resp:  CTA bilaterally. No wheezing/rhonchi/rales. No accessory muscle use. CV:  Regular rhythm, normal rate, no murmurs, gallops, rubs    GI:  Soft, non distended, non tender. Bs+    Musculoskeletal:  No edema, warm, 2+ pulses throughout    Neurologic:  Moves all extremities.   Awake and alert, CN II-XII reviewed            Data Review:    Review and/or order of clinical lab test  Review and/or order of tests in the radiology section of CPT  Review and/or order of tests in the medicine section of CPT      Labs:     Recent Labs     01/15/23  0204 01/13/23  0526   WBC 5.8 5.7   HGB 8.7* 8.5*   HCT 30.3* 28.5*    264       Recent Labs     01/15/23  0204 01/14/23  0543 01/13/23  0526   NA 135* 136 135*   K 4.8 4.5 4.6    107 106   CO2 24 24 23   BUN 28* 29* 29*   CREA 1.36* 1.29 1.28   * 162* 106*   CA 9.1 9.2 9.4   MG  --  2.4 2.3   PHOS  --  3.7 3.4       Recent Labs     01/15/23  0204 01/14/23  0543 01/13/23  0526   ALT 47 52 55   * 174* 181*   TBILI 0.3 0.4 0.4   TP 6.3* 6.0* 6.0*   ALB 2.8* 2.9* 3.0*   GLOB 3.5 3.1 3.0       No results for input(s): INR, PTP, APTT, INREXT, INREXT in the last 72 hours. No results for input(s): FE, TIBC, PSAT, FERR in the last 72 hours. Lab Results   Component Value Date/Time    Folate 10.5 07/03/2018 09:24 AM        No results for input(s): PH, PCO2, PO2 in the last 72 hours. No results for input(s): CPK, CKNDX, TROIQ in the last 72 hours.     No lab exists for component: CPKMB    Lab Results   Component Value Date/Time    Cholesterol, total 170 01/12/2023 05:00 AM    HDL Cholesterol 40 01/12/2023 05:00 AM    LDL, calculated 87 01/12/2023 05:00 AM    Triglyceride 215 (H) 01/12/2023 05:00 AM    CHOL/HDL Ratio 4.3 01/12/2023 05:00 AM     Lab Results   Component Value Date/Time    Glucose (POC) 234 (H) 01/15/2023 09:25 PM    Glucose (POC) 184 (H) 01/15/2023 04:49 PM    Glucose (POC) 222 (H) 01/15/2023 01:03 PM    Glucose (POC) 131 (H) 01/15/2023 07:02 AM    Glucose (POC) 236 (H) 01/14/2023 09:47 PM     Lab Results   Component Value Date/Time    Color YELLOW/STRAW 01/01/2023 09:13 AM    Appearance CLEAR 01/01/2023 09:13 AM    Specific gravity 1.013 01/01/2023 09:13 AM    Specific gravity 1.020 05/03/2014 08:18 AM    pH (UA) 5.5 01/01/2023 09:13 AM    Protein 30 (A) 01/01/2023 09:13 AM    Glucose Negative 01/01/2023 09:13 AM    Ketone Negative 01/01/2023 09:13 AM    Bilirubin Negative 01/01/2023 09:13 AM    Urobilinogen 1.0 01/01/2023 09:13 AM    Nitrites Negative 01/01/2023 09:13 AM    Leukocyte Esterase Negative 01/01/2023 09:13 AM    Epithelial cells FEW 01/01/2023 09:13 AM    Bacteria Negative 01/01/2023 09:13 AM    WBC 0-4 01/01/2023 09:13 AM    RBC 0-5 01/01/2023 09:13 AM         Medications Reviewed:     Current Facility-Administered Medications   Medication Dose Route Frequency    [START ON 1/16/2023] digoxin (LANOXIN) tablet 0.125 mg  0.125 mg Oral EVERY OTHER DAY    milrinone (PRIMACOR) 20 MG/100 ML D5W infusion  0.2 mcg/kg/min IntraVENous CONTINUOUS    midodrine (PROAMATINE) tablet 10 mg  10 mg Oral TID WITH MEALS    finasteride (PROSCAR) tablet 5 mg  5 mg Oral DAILY WITH DINNER    insulin lispro (HUMALOG) injection 6 Units  6 Units SubCUTAneous TID WITH MEALS    ivabradine (CORLANOR) tablet 2.5 mg  2.5 mg Oral BID WITH MEALS    insulin glargine (LANTUS) injection 6 Units  6 Units SubCUTAneous QHS    insulin lispro (HUMALOG) injection   SubCUTAneous AC&HS    dextrose 10 % infusion 0-250 mL  0-250 mL IntraVENous PRN    spironolactone (ALDACTONE) tablet 12.5 mg  12.5 mg Oral DAILY    melatonin tablet 3 mg  3 mg Oral QHS PRN    [Held by provider] empagliflozin (JARDIANCE) tablet 10 mg  10 mg Oral DAILY    dextrose 10 % infusion 0-250 mL  0-250 mL IntraVENous PRN    albuterol-ipratropium (DUO-NEB) 2.5 MG-0.5 MG/3 ML  3 mL Nebulization Q6H PRN    mirtazapine (REMERON) tablet 7.5 mg  7.5 mg Oral QHS    [Held by provider] potassium chloride SR (KLOR-CON 10) tablet 40 mEq  40 mEq Oral BID    pantoprazole (PROTONIX) tablet 40 mg  40 mg Oral ACB    heparin (porcine) injection 5,000 Units  5,000 Units SubCUTAneous Q12H    QUEtiapine (SEROquel) tablet 50 mg  50 mg Oral QHS    lidocaine 4 % patch 1 Patch  1 Patch TransDERmal Q24H    balsam peru-castor oiL (VENELEX) ointment   Topical BID    alteplase (CATHFLO) 1 mg in sterile water (preservative free) 1 mL injection  1 mg InterCATHeter PRN    bacitracin 500 unit/gram packet 1 Packet  1 Packet Topical PRN    glucose chewable tablet 16 g  4 Tablet Oral PRN    glucagon (GLUCAGEN) injection 1 mg  1 mg IntraMUSCular PRN    senna-docusate (PERICOLACE) 8.6-50 mg per tablet 1 Tablet  1 Tablet Oral BID PRN bisacodyL (DULCOLAX) suppository 10 mg  10 mg Rectal QHS PRN    sodium chloride (NS) flush 5-40 mL  5-40 mL IntraVENous Q8H    sodium chloride (NS) flush 5-40 mL  5-40 mL IntraVENous PRN    acetaminophen (TYLENOL) tablet 650 mg  650 mg Oral Q6H PRN    Or    acetaminophen (TYLENOL) suppository 650 mg  650 mg Rectal Q6H PRN    polyethylene glycol (MIRALAX) packet 17 g  17 g Oral DAILY PRN    ondansetron (ZOFRAN ODT) tablet 4 mg  4 mg Oral Q8H PRN    Or    ondansetron (ZOFRAN) injection 4 mg  4 mg IntraVENous Q6H PRN    aspirin delayed-release tablet 81 mg  81 mg Oral DAILY    ipratropium (ATROVENT) 21 mcg (0.03 %) nasal spray 2 Spray  2 Spray Both Nostrils Q12H    sodium chloride (NS) flush 5-40 mL  5-40 mL IntraVENous PRN     ______________________________________________________________________  EXPECTED LENGTH OF STAY: 5d 0h  ACTUAL LENGTH OF STAY:          24                 Alejandra Solorio MD

## 2023-01-16 NOTE — PROGRESS NOTES
1930  Verbal bedside report given to CCU, RN oncoming nurse by YANDEL Delatorre RN off-going nurse. Report included current pt status and condition, recent results, hx of present illness, heart rate and rhythm (NSR), and respiratory status. 1300  Took pt to CT    0830  Pt up to chair, despondent. Checked with attending concerning removing coude and doing void trial, urology wants coude in place until discharge. 0730  Verbal bedside report received from Tram Gomez RN off-going nurse by Maggie Dixon RN oncoming nurse. Report included current pt status and condition, recent results, hx of present illness, heart rate and rhythm (NSR), and respiratory status. Greeted pt and enquired about present needs and goals for the day.

## 2023-01-17 LAB
ALBUMIN SERPL-MCNC: 3.4 G/DL (ref 3.5–5)
ALBUMIN/GLOB SERPL: 1 (ref 1.1–2.2)
ALP SERPL-CCNC: 190 U/L (ref 45–117)
ALT SERPL-CCNC: 50 U/L (ref 12–78)
ANION GAP SERPL CALC-SCNC: 6 MMOL/L (ref 5–15)
AST SERPL-CCNC: 34 U/L (ref 15–37)
BILIRUB SERPL-MCNC: 0.5 MG/DL (ref 0.2–1)
BNP SERPL-MCNC: 3173 PG/ML
BUN SERPL-MCNC: 26 MG/DL (ref 6–20)
BUN/CREAT SERPL: 18 (ref 12–20)
CALCIUM SERPL-MCNC: 9.7 MG/DL (ref 8.5–10.1)
CHLORIDE SERPL-SCNC: 109 MMOL/L (ref 97–108)
CO2 SERPL-SCNC: 21 MMOL/L (ref 21–32)
CREAT SERPL-MCNC: 1.48 MG/DL (ref 0.7–1.3)
DIGOXIN SERPL-MCNC: 0.9 NG/ML (ref 0.9–2)
ERYTHROCYTE [DISTWIDTH] IN BLOOD BY AUTOMATED COUNT: 24.1 % (ref 11.5–14.5)
GLOBULIN SER CALC-MCNC: 3.4 G/DL (ref 2–4)
GLUCOSE BLD STRIP.AUTO-MCNC: 166 MG/DL (ref 65–117)
GLUCOSE BLD STRIP.AUTO-MCNC: 171 MG/DL (ref 65–117)
GLUCOSE BLD STRIP.AUTO-MCNC: 179 MG/DL (ref 65–117)
GLUCOSE BLD STRIP.AUTO-MCNC: 79 MG/DL (ref 65–117)
GLUCOSE SERPL-MCNC: 168 MG/DL (ref 65–100)
HCT VFR BLD AUTO: 31.4 % (ref 36.6–50.3)
HGB BLD-MCNC: 9.2 G/DL (ref 12.1–17)
MAGNESIUM SERPL-MCNC: 2.3 MG/DL (ref 1.6–2.4)
MCH RBC QN AUTO: 24.2 PG (ref 26–34)
MCHC RBC AUTO-ENTMCNC: 29.3 G/DL (ref 30–36.5)
MCV RBC AUTO: 82.6 FL (ref 80–99)
NRBC # BLD: 0 K/UL (ref 0–0.01)
NRBC BLD-RTO: 0 PER 100 WBC
PHOSPHATE SERPL-MCNC: 3 MG/DL (ref 2.6–4.7)
PLATELET # BLD AUTO: 238 K/UL (ref 150–400)
PMV BLD AUTO: 10 FL (ref 8.9–12.9)
POTASSIUM SERPL-SCNC: 5.3 MMOL/L (ref 3.5–5.1)
PROCALCITONIN SERPL-MCNC: 0.21 NG/ML
PROT SERPL-MCNC: 6.8 G/DL (ref 6.4–8.2)
PSA SERPL-MCNC: 2.2 NG/ML (ref 0.01–4)
RBC # BLD AUTO: 3.8 M/UL (ref 4.1–5.7)
SERVICE CMNT-IMP: ABNORMAL
SERVICE CMNT-IMP: NORMAL
SODIUM SERPL-SCNC: 136 MMOL/L (ref 136–145)
WBC # BLD AUTO: 5.8 K/UL (ref 4.1–11.1)

## 2023-01-17 PROCEDURE — 80162 ASSAY OF DIGOXIN TOTAL: CPT

## 2023-01-17 PROCEDURE — 99233 SBSQ HOSP IP/OBS HIGH 50: CPT | Performed by: INTERNAL MEDICINE

## 2023-01-17 PROCEDURE — 36415 COLL VENOUS BLD VENIPUNCTURE: CPT

## 2023-01-17 PROCEDURE — 74011250637 HC RX REV CODE- 250/637: Performed by: INTERNAL MEDICINE

## 2023-01-17 PROCEDURE — 84145 PROCALCITONIN (PCT): CPT

## 2023-01-17 PROCEDURE — 74011250636 HC RX REV CODE- 250/636: Performed by: STUDENT IN AN ORGANIZED HEALTH CARE EDUCATION/TRAINING PROGRAM

## 2023-01-17 PROCEDURE — 83735 ASSAY OF MAGNESIUM: CPT

## 2023-01-17 PROCEDURE — 85027 COMPLETE CBC AUTOMATED: CPT

## 2023-01-17 PROCEDURE — 83880 ASSAY OF NATRIURETIC PEPTIDE: CPT

## 2023-01-17 PROCEDURE — 80053 COMPREHEN METABOLIC PANEL: CPT

## 2023-01-17 PROCEDURE — 84153 ASSAY OF PSA TOTAL: CPT

## 2023-01-17 PROCEDURE — 74011250637 HC RX REV CODE- 250/637: Performed by: FAMILY MEDICINE

## 2023-01-17 PROCEDURE — 74011000250 HC RX REV CODE- 250: Performed by: STUDENT IN AN ORGANIZED HEALTH CARE EDUCATION/TRAINING PROGRAM

## 2023-01-17 PROCEDURE — 82962 GLUCOSE BLOOD TEST: CPT

## 2023-01-17 PROCEDURE — 74011636637 HC RX REV CODE- 636/637: Performed by: CLINICAL NURSE SPECIALIST

## 2023-01-17 PROCEDURE — 65660000001 HC RM ICU INTERMED STEPDOWN

## 2023-01-17 PROCEDURE — 99233 SBSQ HOSP IP/OBS HIGH 50: CPT | Performed by: CLINICAL NURSE SPECIALIST

## 2023-01-17 PROCEDURE — 84100 ASSAY OF PHOSPHORUS: CPT

## 2023-01-17 PROCEDURE — 97530 THERAPEUTIC ACTIVITIES: CPT

## 2023-01-17 PROCEDURE — 97116 GAIT TRAINING THERAPY: CPT

## 2023-01-17 PROCEDURE — 74011000250 HC RX REV CODE- 250

## 2023-01-17 PROCEDURE — 74011250637 HC RX REV CODE- 250/637: Performed by: STUDENT IN AN ORGANIZED HEALTH CARE EDUCATION/TRAINING PROGRAM

## 2023-01-17 PROCEDURE — 74011250637 HC RX REV CODE- 250/637: Performed by: NURSE PRACTITIONER

## 2023-01-17 RX ORDER — INSULIN LISPRO 100 [IU]/ML
8 INJECTION, SOLUTION INTRAVENOUS; SUBCUTANEOUS
Status: CANCELLED | OUTPATIENT
Start: 2023-01-17

## 2023-01-17 RX ORDER — INSULIN GLARGINE 100 [IU]/ML
6 INJECTION, SOLUTION SUBCUTANEOUS
Status: DISCONTINUED | OUTPATIENT
Start: 2023-01-17 | End: 2023-01-19 | Stop reason: HOSPADM

## 2023-01-17 RX ORDER — IBUPROFEN 200 MG
CAPSULE ORAL
Qty: 100 STRIP | Refills: 1 | Status: SHIPPED | OUTPATIENT
Start: 2023-01-17 | End: 2023-01-20

## 2023-01-17 RX ORDER — MIDODRINE HYDROCHLORIDE 5 MG/1
5 TABLET ORAL
Status: DISCONTINUED | OUTPATIENT
Start: 2023-01-17 | End: 2023-01-18

## 2023-01-17 RX ORDER — INSULIN LISPRO 100 [IU]/ML
INJECTION, SOLUTION INTRAVENOUS; SUBCUTANEOUS
Status: DISCONTINUED | OUTPATIENT
Start: 2023-01-17 | End: 2023-01-19 | Stop reason: HOSPADM

## 2023-01-17 RX ORDER — INSULIN PUMP SYRINGE, 3 ML
EACH MISCELLANEOUS
Qty: 1 KIT | Refills: 0 | Status: SHIPPED | OUTPATIENT
Start: 2023-01-17 | End: 2023-01-20 | Stop reason: SDUPTHER

## 2023-01-17 RX ORDER — LANCETS
EACH MISCELLANEOUS
Qty: 100 EACH | Refills: 1 | Status: SHIPPED | OUTPATIENT
Start: 2023-01-17

## 2023-01-17 RX ORDER — POLYETHYLENE GLYCOL 3350, SODIUM SULFATE, SODIUM CHLORIDE, POTASSIUM CHLORIDE, SODIUM ASCORBATE, AND ASCORBIC ACID 7.5-2.691G
2 KIT ORAL ONCE
Status: COMPLETED | OUTPATIENT
Start: 2023-01-17 | End: 2023-01-17

## 2023-01-17 RX ORDER — LANCETS
EACH MISCELLANEOUS
Qty: 100 EACH | Refills: 1 | Status: SHIPPED | OUTPATIENT
Start: 2023-01-17 | End: 2023-01-17 | Stop reason: SDUPTHER

## 2023-01-17 RX ORDER — INSULIN GLARGINE 100 [IU]/ML
8 INJECTION, SOLUTION SUBCUTANEOUS
Status: DISCONTINUED | OUTPATIENT
Start: 2023-01-17 | End: 2023-01-17

## 2023-01-17 RX ORDER — FLASH GLUCOSE SENSOR
1 KIT MISCELLANEOUS
Qty: 1 KIT | Refills: 1 | Status: SHIPPED | OUTPATIENT
Start: 2023-01-17

## 2023-01-17 RX ADMIN — IPRATROPIUM BROMIDE 2 SPRAY: 21 SPRAY, METERED NASAL at 22:13

## 2023-01-17 RX ADMIN — QUETIAPINE FUMARATE 50 MG: 25 TABLET ORAL at 22:12

## 2023-01-17 RX ADMIN — MIDODRINE HYDROCHLORIDE 5 MG: 5 TABLET ORAL at 16:45

## 2023-01-17 RX ADMIN — HEPARIN SODIUM 5000 UNITS: 5000 INJECTION INTRAVENOUS; SUBCUTANEOUS at 09:17

## 2023-01-17 RX ADMIN — SPIRONOLACTONE 12.5 MG: 25 TABLET ORAL at 09:17

## 2023-01-17 RX ADMIN — MIRTAZAPINE 7.5 MG: 15 TABLET, FILM COATED ORAL at 22:12

## 2023-01-17 RX ADMIN — Medication 6 UNITS: at 12:40

## 2023-01-17 RX ADMIN — MIDODRINE HYDROCHLORIDE 5 MG: 5 TABLET ORAL at 12:40

## 2023-01-17 RX ADMIN — Medication 6 UNITS: at 09:17

## 2023-01-17 RX ADMIN — IPRATROPIUM BROMIDE 2 SPRAY: 21 SPRAY, METERED NASAL at 09:19

## 2023-01-17 RX ADMIN — ASPIRIN 81 MG: 81 TABLET, COATED ORAL at 09:17

## 2023-01-17 RX ADMIN — PANTOPRAZOLE SODIUM 40 MG: 40 TABLET, DELAYED RELEASE ORAL at 07:16

## 2023-01-17 RX ADMIN — SODIUM CHLORIDE, PRESERVATIVE FREE 10 ML: 5 INJECTION INTRAVENOUS at 16:56

## 2023-01-17 RX ADMIN — SODIUM CHLORIDE, PRESERVATIVE FREE 10 ML: 5 INJECTION INTRAVENOUS at 22:12

## 2023-01-17 RX ADMIN — FINASTERIDE 5 MG: 5 TABLET, FILM COATED ORAL at 16:45

## 2023-01-17 RX ADMIN — MIDODRINE HYDROCHLORIDE 10 MG: 5 TABLET ORAL at 09:18

## 2023-01-17 RX ADMIN — IVABRADINE 2.5 MG: 5 TABLET, FILM COATED ORAL at 09:18

## 2023-01-17 RX ADMIN — Medication 2 UNITS: at 12:40

## 2023-01-17 RX ADMIN — Medication: at 19:59

## 2023-01-17 RX ADMIN — Medication 6 UNITS: at 16:55

## 2023-01-17 RX ADMIN — Medication 2 UNITS: at 16:56

## 2023-01-17 RX ADMIN — HEPARIN SODIUM 5000 UNITS: 5000 INJECTION INTRAVENOUS; SUBCUTANEOUS at 22:12

## 2023-01-17 RX ADMIN — SODIUM CHLORIDE, PRESERVATIVE FREE 10 ML: 5 INJECTION INTRAVENOUS at 08:56

## 2023-01-17 RX ADMIN — ONDANSETRON HYDROCHLORIDE 4 MG: 2 INJECTION, SOLUTION INTRAMUSCULAR; INTRAVENOUS at 13:02

## 2023-01-17 RX ADMIN — Medication: at 09:18

## 2023-01-17 RX ADMIN — POLYETHYLENE GLYCOL 3350, SODIUM SULFATE, SODIUM CHLORIDE, POTASSIUM CHLORIDE, ASCORBIC ACID, SODIUM ASCORBATE 2 L: KIT at 12:43

## 2023-01-17 RX ADMIN — IVABRADINE 2.5 MG: 5 TABLET, FILM COATED ORAL at 16:45

## 2023-01-17 NOTE — PROGRESS NOTES
Problem: Mobility Impaired (Adult and Pediatric)  Goal: *Therapy Goal (Edit Goal, Insert Text)  Description: FUNCTIONAL STATUS PRIOR TO ADMISSION: Patient was independent and active without use of DME. Patient is currently driving. Patient is independent with ADLs and IADLs. HOME SUPPORT PRIOR TO ADMISSION: The patient lived with his wife, but did not require assist.    Physical Therapy Goals  Continued 1/4/2023; remain appropriate 1/11/23  1. Patient will move from supine to sit and sit to supine, scoot up and down, and roll side to side in bed with modified independence within 7 day(s). 2.  Patient will transfer from bed to chair and chair to bed with modified independence using the least restrictive device within 7 day(s). 3.  Patient will perform sit to stand with modified independence within 7 day(s). 4.  Patient will ambulate with modified independence for 150 feet with the least restrictive device within 7 day(s). 5.  Patient will ascend/descend 13 stairs with single handrail(s) with modified independence within 7 day(s). Initiated 12/24/2022  1. Patient will move from supine to sit and sit to supine, scoot up and down, and roll side to side in bed with modified independence within 7 day(s). 2.  Patient will transfer from bed to chair and chair to bed with modified independence using the least restrictive device within 7 day(s). 3.  Patient will perform sit to stand with modified independence within 7 day(s). 4.  Patient will ambulate with modified independence for 150 feet with the least restrictive device within 7 day(s). 5.  Patient will ascend/descend 13 stairs with single handrail(s) with modified independence within 7 day(s).     Outcome: Progressing Towards Goal    PHYSICAL THERAPY TREATMENT  Patient: Romain Wright (76 y.o. male)  Date: 1/17/2023  Diagnosis: [facility]epsis ([facility] [A41.9] <principal problem not specified>  Procedure(s) (LRB):  RIGHT HEART CATH (N/A) 8 Days Post-Op  Precautions: Fall  Chart, physical therapy assessment, plan of care and goals were reviewed. ASSESSMENT  Patient continues with skilled PT services and is progressing towards goals. Pt is mobilizing at supervision level with rollator. Pt was able to complete 6MWT. .       6 MWT results: (Specify if any supplemental oxygen is used, the type, pre, during and post sats.)  Distance Walked in Feet (ft): 489 ft. Pre Heart Rate: 99   Pre O2 Saturation: 98 (room air)      Post Heart Rate: 104   Post O2 Saturation: 97 (room air)   Assistive device used: Assistive Device: Walker, rollator       Normative data:   Men 39-80 years old = 1889 feet; Women 3680 years auo=3627 feet  Modified 10 point Yolande RPE scale utilized:  0 = no breathlessness at all ---> 10 = maximum exertion  Please refer to the flowsheet for any additional vital signs taken during this treatment. Current Level of Function Impacting Discharge (mobility/balance): supervision     Other factors to consider for discharge:          PLAN :  Patient continues to benefit from skilled intervention to address the above impairments. Continue treatment per established plan of care. to address goals. Recommendation for discharge: (in order for the patient to meet his/her long term goals)  Physical therapy at least 2 days/week in the home     This discharge recommendation:  Has not yet been discussed the attending provider and/or case management    IF patient discharges home will need the following DME: rollator has been delivered for discharge       SUBJECTIVE:   Patient stated I am ready .     OBJECTIVE DATA SUMMARY:   Critical Behavior:  Neurologic State: Alert  Orientation Level: Oriented X4  Cognition: Appropriate decision making, Follows commands  Safety/Judgement: Awareness of environment, Fall prevention, Home safety  Functional Mobility Training:  Bed Mobility:                    Transfers: Balance:  Sitting: Intact  Standing: Intact; With support  Ambulation/Gait Training:  Distance (ft): 489 Feet (ft) (for 6MWT. pt ambulated 872 feet during session)  Assistive Device: Walker, rollator  Ambulation - Level of Assistance: Supervision        Gait Abnormalities: Decreased step clearance              Speed/Bette: Slow  Step Length: Right shortened;Left shortened             Stairs:                Pain Rating:  No complaints     Activity Tolerance:   Limited     After treatment patient left in no apparent distress:   Sitting in chair, Call bell within reach, and Caregiver / family present    COMMUNICATION/COLLABORATION:   The patients plan of care was discussed with: Registered nurse.      Jean Gore PTA   Time Calculation: 24 mins

## 2023-01-17 NOTE — PROGRESS NOTES
0800  Bedside report given to Darian Hood RN by Joan Joseph RN. Report included SBAR, ED Report, Labs, and Cardiac Rhythm NSr. Patient in bed resting well. Vitals are stable.

## 2023-01-17 NOTE — PROGRESS NOTES
Transitions of Care Plan  RUR: 25% - high  Clinical Update: milrinone started; LVAD workup; needs plavix washout - anticipate d/c tomorrow and return outpatient  Consults: [facility]; GI  Baseline: independent without DME; resides w wife  Barriers to Discharge: medical  Disposition:   [address]  Estimated Discharge Date: 1/19/23    CM received consults and spoke with [facility] MD re clinical update and disposition plan. Patient will have GI scopes this admission and anticipate he will be ready for discharge Thursday pending medical stability. CM sent home infusion order for home milrinone to [facility] with supporting clinicals and advised anticipated discharge Thursday home w home health through [facility]. CM sent [facility] order w supporting clinicals to [facility] for review. CM will continue to follow.     Disposition Plan:  [address] [facility] - [facility]  DME - rollator and [address] Erwin Starks, MPH  Care Manager l [address]  Available via [employer] or  [employer]

## 2023-01-17 NOTE — DIABETES MGMT
[facility]  DIABETES MANAGEMENT CONSULT    Consulted by  Sixto Oconnor MD   for advanced nursing evaluation and care for inpatient blood glucose management. Evaluation and Action Plan   Nader Clark is a 71year old gentleman, with Type 2 Diabetes with a recent A1C of 6.5%, who was admitted with acute on chronic HFrEF and cardiogenic shock. He was admitted 2 weeks prior for similar complaint and antihyperglycemic agents were adjusted on discharge. Metformin was stopped due to decline in GFR and Jardiance switched to [place] (unclear why). He was compliant with his Katherne Goran, [place] and Glipizide up until day prior to this hospitalization. His glucose was significantly elevated at 458 on admission, s/t insulin resistance from acute HFrEF, elevated lactate and impaired perfusion. Low dose basal insulin was started and sufficient to control BG. There was a suspicion for myocarditis and hydrocortisone 50mg twice daily was started. Basal insulin was escalated to 48 units but with an exaggerated post-prandial glucose spike to over 400 daily- related to steroids in the post-prandial state and high carb containing nutritional shakes. Steroids weaned to off and basal now at baseline- 8 units daily. 10mg Jardiance daily was started for heart failure management. Despite Jardiance and Glargine, he continued to have pre-prandial hyperglycemia this weekend and bolus insulin resumed. He was not able to tolerate Jardiance per advanced HF and will not continue. He will therefore need an advancement in basal dosing when GI scopes complete tomorrow. Inpatient BG goal is 140-180mg/dl. Management Rationale Action Plan   Medication   Basal needs Diabetes: 0.15 units/kg as fasting over   goal without Jardiance   6 units Lantus HS- will continue this dose as will be NPO for scopes tomorrow.   Then will increase to 8 units HS after procedures complete   Nutritional needs Using moderate sensitivity based on   oral intake and degree of pre-prandial   hyperglycemia off steroids Continue 6 units Humalog/meal    Hold if patient is NPO or consumes less than 50% of carbohydrates on meal tray   Corrective insulin Using normal sensitivity Changed to Normal Sensitivity ACHS   Additional Recommendations. POC glucose ACHS    Consistent carbohydrate diet (60 grams CHO/meal). Appreciate RD help with adjustments. Jardiance per F     Discharge Planning:   Continue Januvia and Glipizide at PTA doses. Resume Metformin (as GFR stable): would start with 500mg BID    SGLT2 will stop at hospital d/c per HF    FUV with PCP for ongoing routine diabetes care. Would recommend that he is seen by endocrinologist given need for specialty diabetes care along with heart disease. I have called the office and awaiting call back. Prescription for glucometer kit (Meter, Lancets (100), Strips (100)). Patient to obtain a blood glucose reading four times daily. First thing in the morning prior to eating and drinking anything then before lunch, dinner and bedtime. Create a log and present to PCP for interpretation. Will drop Rx off with outpatient pharmacy so that I can train him on the exact machine that he will go home with. Family interested in a CGM, which would enable close glucose monitoring. He is at risk for significant glucose fluctuations with his heart disease and would allow for titration of diabetes medications. Initial Presentation   Destiny Hernandez is a 71 y.o. male who presented to the ED 12/22/22 with a 3-4 day c/o gradually worsening dyspnea, fatigue, chills, constipation and RLQ pain. He endorses difficulty voiding despite compliance with lasix. In the ED, he was hypoxic and started on supplemental O2 and diuresis.    LAB: WBC 25.1, , GFR 25, Lac 5.2, Trop 1096, BNP 6905  CXR: Widespread interstitial opacities bilateral mid to lower lung opacities  concerning for edema and/or atypical infection. Small bilateral pleural  effusions. CT: CT revealed distended bladder  EKG: Sinus tachycardia   Biatrial enlargement   Rightward axis   Marked ST abnormality, possible inferior subendocardial injury   Marked ST abnormality, possible lateral subendocardial injury   Abnormal ECG     Did have a hospital admission for HFrEF 12/11/22-12/16/22    HX:   Past Medical History:   Diagnosis Date    CAD (coronary artery disease) 11/10/2016    NSTEMI & 2 stents    Deafness 10/28/2012    DM (diabetes mellitus) ([facility] [address]     Elevated cholesterol     Hypertension     NSTEMI (non-ST elevated myocardial infarction) ([facility] [address] 11/10/2016    CKD  CAD with CABG 2018    INITIAL DX:   Sepsis ([facility] [address] [A41.9]     Current Treatment     TX: IVF, Diuresis, Supplemental O2    Hospital Course   Clinical progress has been complicated by:   36/64: Initial admission with hospitalist service but with progressive hypoxia early requiring CCU transfer. Elevated troponin/BNP. BPH and hydronephrosis. Pt pan cultured, given dose of abx and dose of IVP lasix. Pt placed on BiPAP w/ improvement in oxygenation. 12/23: Seen by urology for bilateral hydronephrosis related to distended bladder. Maintain urinary catheter. 12/24: CVL placement   12/26: Decreased urine output, started on bumex and dobutamine gtt. Cardiology consult. Continue inotrope. 12/27: Advanced Heart Failure Consult. Started HF evaluation. Nephrology consult: Decreased bumex gtt  12/28: Mini pulse steroids per cardiology for treatment of myocarditis. Insulin gtt. 12/30: Transferred to hospitalist service. 1/3: Cardiac MRI: Ischemic CM  1/6: Steroids complete. Hameed Cleaves started  1/9: Right heart cath: Normal to low filling pressures but mildly reduced CI. CI of 2 l/min/m2 off dobutamine  PICC Placed and Milrinone started. Urology consult for hematuria. Held plavix x1 day. GI consult for LVAD work-up.    Diabetes History   Type 2 Diabetes  Ambulatory BG management provided by: PCP Tigist Quintanilla MD    Diabetes-related Medical History  Acute complications  Acute hyperglycemia  Neurological complications  Peripheral neuropathy  Microvascular disease  Nephropathy  Macrovascular disease  CAD  Other associated conditions     CHF    Diabetes Medication History  Key Antihyperglycemic Medications               dapagliflozin (FARXIGA) 10 mg tab tablet (Taking) Take 1 Tablet by mouth daily. glipiZIDE (GLUCOTROL) 5 mg tablet (Taking) Take 1 tablet by mouth twice daily    Januvia 50 mg tablet (Taking) Take 1 tablet by mouth once daily             Diabetes self-management practices:   Eating pattern   Eats 3 small meals daily  [x] Breakfast  2 Eggs, Coffee  [x] Lunch   Anthon  [x] Dinner   \" Food\" Bread, rice, lentils   [x] Bedtime  [place]  [x] Snacks   Afternoon snack  [x] Beverages  Water, Coffee  Physical activity pattern   Sedentary   Monitoring pattern  Does not check BG  Taking medications pattern  [x] Consistent administration  [x] Affordable  Social determinants of health impacting diabetes self-management practices   Concerned that you need to know more about how to stay healthy with diabetes  Overall evaluation:    [x] Achieving A1c target with drug therapy & self-care practices    Subjective   Hello.      Objective   Physical exam  General Underweight male in mild distress/ill-appearing. Conversant and cooperative  Neuro  Alert, oriented   Vital Signs Visit Vitals  BP (!) 104/37   Pulse 89   Temp 97.9 °F (36.6 °C)   Resp 16   Ht 5' 9\" (1.753 m)   Wt 52.4 kg (115 lb 8.3 oz)   SpO2 98%   BMI 17.06 kg/m²     Skin  Warm and dry. Acanthosis noted along neckline. No lipohypertrophy or lipoatrophy noted at injection sites   Heart   Regular rate and rhythm.  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 Blood glucose pattern    Significant diabetes-related events over the past 24-72 hours  A1C  6.5% 22 (down from 7.0 10/12/22)  Fasting B  Pre-prandial: 150-263  Basal: Lantus 6 units HS  Bolus: 6 units/meal  Correction: 4 units in the last 24h  Jardiance 10mg daily: Held 1/15    Ultimate plan is to D/C home with inotrope support as bridge to LVAD.  Tentative d/c at the end of this week after GI scopes    Assessment and Nursing Intervention   Nursing Diagnosis Risk for unstable blood glucose pattern   Nursing Intervention Domain 5250 Decision-making Support   Nursing Interventions Examined current inpatient diabetes/blood glucose control   Explored factors facilitating and impeding inpatient management  Explored corrective strategies with patient and responsible inpatient provider   Informed patient of rational for insulin strategy while hospitalized     Nursing Diagnosis 68019 Ineffective Health Management   Nursing Intervention Domain 5250 Decision-making Support   Nursing Interventions Identified diabetes self-management practices impeding diabetes control  Discussed diabetes survival skills related to  Healthy Plate eating plan; given handouts  Role of physical activity in improving insulin sensitivity and action  Procedure for blood glucose monitoring & options for low-cost products  Medications plan at discharge     Billing Code(s) 52989    Before making these care recommendations, I personally reviewed the hospitalization record, including notes, laboratory & diagnostic data and current medications, and examined the patient at the bedside (circumstances permitting) before making care recommendations. More than fifty (50) percent of the time was spent in patient counseling and/or care coordination.   Total minutes: 48    De Ingham, CNS  Diabetes Clinical Nurse Specialist  [facility]  Access via [place]

## 2023-01-17 NOTE — PROGRESS NOTES
Met with pt. And wife this morning at 11 at bedside. Discussed pt. Care goals: pt. Would like to return to his highest level of independent functioning. He was not using any DME before his hospitalization. He has not articulated any other goals, but would hope the LVAD surgery would enhance his quality of life. SW educated about the role of the patient in their own care and the supporting role of the caregiver. Pt. Wife requested a discussion about pt. Responsibilities as it pertains to the heart pump. Pt. Was receptive to this discussion and stated he has no concerns, is planning to be very self-directed in learning about the device. SW also educated pt. About her role as his support especially in the first two months after surgery She was also receptive and agreeable to this role. SW encouraged family to speak to the coordinators if surgery becomes the next step about LVAD training sessions.

## 2023-01-17 NOTE — PROGRESS NOTES
Problem: Diabetes Self-Management  Goal: *Disease process and treatment process  Description: Define diabetes and identify own type of diabetes; list 3 options for treating diabetes. Outcome: Progressing Towards Goal  Goal: *Incorporating nutritional management into lifestyle  Description: Describe effect of type, amount and timing of food on blood glucose; list 3 methods for planning meals. Outcome: Progressing Towards Goal  Goal: *Incorporating physical activity into lifestyle  Description: State effect of exercise on blood glucose levels. Outcome: Progressing Towards Goal  Goal: *Developing strategies to promote health/change behavior  Description: Define the ABC's of diabetes; identify appropriate screenings, schedule and personal plan for screenings. Outcome: Progressing Towards Goal  Goal: *Using medications safely  Description: State effect of diabetes medications on diabetes; name diabetes medication taking, action and side effects. Outcome: Progressing Towards Goal  Goal: *Monitoring blood glucose, interpreting and using results  Description: Identify recommended blood glucose targets  and personal targets. Outcome: Progressing Towards Goal  Goal: *Prevention, detection, treatment of acute complications  Description: List symptoms of hyper- and hypoglycemia; describe how to treat low blood sugar and actions for lowering  high blood glucose level. Outcome: Progressing Towards Goal  Goal: *Prevention, detection and treatment of chronic complications  Description: Define the natural course of diabetes and describe the relationship of blood glucose levels to long term complications of diabetes.   Outcome: Progressing Towards Goal  Goal: *Developing strategies to address psychosocial issues  Description: Describe feelings about living with diabetes; identify support needed and support network  Outcome: Progressing Towards Goal  Goal: *Insulin pump training  Outcome: Progressing Towards Goal  Goal: *Sick day guidelines  Outcome: Progressing Towards Goal  Goal: *Patient Specific Goal (EDIT GOAL, INSERT TEXT)  Outcome: Progressing Towards Goal     Problem: Patient Education: Go to Patient Education Activity  Goal: Patient/Family Education  Outcome: Progressing Towards Goal     Problem: Falls - Risk of  Goal: *Absence of Falls  Description: Document [place] Fall Risk and appropriate interventions in the flowsheet.   Outcome: Progressing Towards Goal  Note: Fall Risk Interventions:  Mobility Interventions: Assess mobility with egress test, Communicate number of staff needed for ambulation/transfer, Patient to call before getting OOB         Medication Interventions: Assess postural VS orthostatic hypotension, Evaluate medications/consider consulting pharmacy, Patient to call before getting OOB    Elimination Interventions: Call light in reach, Patient to call for help with toileting needs    History of Falls Interventions: Door open when patient unattended, Evaluate medications/consider consulting pharmacy         Problem: Patient Education: Go to Patient Education Activity  Goal: Patient/Family Education  Outcome: Progressing Towards Goal     Problem: Patient Education: Go to Patient Education Activity  Goal: Patient/Family Education  Outcome: Progressing Towards Goal     Problem: Heart Failure: Discharge Outcomes  Goal: *Demonstrates ability to perform prescribed activity without shortness of breath or discomfort  Outcome: Progressing Towards Goal  Goal: *Left ventricular function assessment completed prior to or during stay, or planned for post-discharge  Outcome: Progressing Towards Goal  Goal: *ACEI prescribed if LVEF less than 40% and no contraindications or ARB prescribed  Outcome: Progressing Towards Goal  Goal: *Verbalizes understanding and describes prescribed diet  Outcome: Progressing Towards Goal  Goal: *Verbalizes understanding/describes prescribed medications  Outcome: Progressing Towards Goal  Goal: *Describes available resources and support systems  Description: (eg: Home Health, Palliative Care, Advanced Medical Directive)  Outcome: Progressing Towards Goal  Goal: *Describes smoking cessation resources  Outcome: Progressing Towards Goal  Goal: *Understands and describes signs and symptoms to report to providers(Stroke Metric)  Outcome: Progressing Towards Goal  Goal: *Describes/verbalizes understanding of follow-up/return appt  Description: (eg: to physicians, diabetes treatment coordinator, and other resources  Outcome: Progressing Towards Goal  Goal: *Describes importance of continuing daily weights and changes to report to physician  Outcome: Progressing Towards Goal     Problem: Discharge Planning  Goal: *Discharge to safe environment  Outcome: Progressing Towards Goal  Goal: *Knowledge of medication management  Outcome: Progressing Towards Goal  Goal: *Knowledge of discharge instructions  Outcome: Progressing Towards Goal     Problem: Patient Education: Go to Patient Education Activity  Goal: Patient/Family Education  Outcome: Progressing Towards Goal     Problem: Patient Education: Go to Patient Education Activity  Goal: Patient/Family Education  Outcome: Progressing Towards Goal     Problem: Patient Education: Go to Patient Education Activity  Goal: Patient/Family Education  Outcome: Progressing Towards Goal     Problem: Pressure Injury - Risk of  Goal: *Prevention of pressure injury  Description: Document Mike Scale and appropriate interventions in the flowsheet.   Outcome: Progressing Towards Goal     Problem: Patient Education: Go to Patient Education Activity  Goal: Patient/Family Education  Outcome: Progressing Towards Goal     Problem: Infection - Risk of, Urinary Catheter-Associated Urinary Tract Infection  Goal: *Absence of infection signs and symptoms  Outcome: Progressing Towards Goal     Problem: Patient Education: Go to Patient Education Activity  Goal: Patient/Family Education  Outcome: Progressing Towards Goal     Problem: Nutrition Deficit  Goal: *Optimize nutritional status  Outcome: Progressing Towards Goal     Problem: Pain  Goal: *Control of Pain  Outcome: Progressing Towards Goal  Goal: *PALLIATIVE CARE:  Alleviation of Pain  Outcome: Progressing Towards Goal     Problem: Patient Education: Go to Patient Education Activity  Goal: Patient/Family Education  Outcome: Progressing Towards Goal

## 2023-01-17 NOTE — PROGRESS NOTES
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, LVEF 25-30%, stage D, NYHA class IIIB/IV; initiated on home milrinone gtt as bridge to completion of LVAD workup  Most likely etiology of cardiomyopathy: CAD +/- TRISTAN +/- inappropriate sinus tach +/- undergoing workup   Tentative discharge home after scopes end-of-week Thu/Fri      RECOMMENDATIONS:  Continue milrinone  0.2mcg/kg/min unable to uptitrate due to HR and BP  Orders for home milrinone placed in chart  Decrease midodrine to 5mg PO TID  Cannot tolerate beta-blockers due to hypotension  Cannot tolerate ARB/ARNi due to hypotension  Of note Flomax was held and BP responded well   Hold spironolactone due to rising potassium 5.3  Hold jardiance 10mg daily as it may contribute to IVVD  Continue corlanor 2.5mg twice daily, goal HR 70-80s  Decrease digoxin 0.125mcg to every other day, check digoxin level daily (today 0.9 goal 0.7-1.2)  Continue to hold diuretics  Give 250cc NS bolus today  Give albumin x once today  Lifevest ordered  Continue baby aspirin   Ok to stop Plavix per Dr. Lodema Nyhan on GI Scopes on   Inpatient sleep medicine consult pending (high risk for TRISTAN)  Appreciate urology recs, will need OP follow up   Changed code status to full  Physical therapy  Plan on DC with home inotropic support as bridge to LVAD  VAD evaluate in progress, will need to do dental, urology, sleep medicine as OP  Ambulate daily  Obtain and document 6 minute walk     All other care per primary team, hopefully home end of the week     IMPRESSION:  Acute on chronic combined systolic/diastolic  heart failure  Stage D, NYHA class IIIB/IV symptoms  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, LVEF 25-30% and 23% (by cMRI 1/3/23)  Cardiac MRI suggestive of ischemic cardiomyopathy  PYP equivocal  Coronary artery disease  CAD s/p CABG x 2: further disease best managed medically due to small vessel size   At risk of sudden cardiac death  Peripheral arterial disease  Bilateral hydronephrosis s/p donnelly  Cardiac risk factors:  HTN  HL  TRISTAN, STOP-BANG 4  DM2  CKD, stage 3  MOCA from 1/4/22 17/30, consistent with mild cognitive impairment  Full code      INTERVAL EVENTS:  Ambulating well, 3 laps  Improved appetite   Afebrile  -130s, HR 70-90s  I/O net neg 1 liter  Weight 115 from 116lbs   Hgb 9.2  Cr 1.48 from 32  K 5.3 from 5 from 4.8  Alb 3.4 from 2.9  T Bili 0.5  NT 3173 from 2400  Procalcitonin 0.21 from 0.23  Prealbumin 15.8      LIFE GOALS:  Lifestyle goals reviewed with the patient. Patient's personal goals include: TBD  Important upcoming milestones or family events: TBD  The patient identifies the following as important for living well: TBD     Patient assessed. High suspicion of sleep apnea due to the following reasons. Will refer for sleep evaluation. [x] Heart Failure  [] Hypertention  [x] Atrial Fibrillation  [] BMI > 30  [] History of stroke  [] Diabetes  [x] Heavy snoring  [] Witnessed apnea  [] Hypoxemia                    HPI:  71 y.o. male who was admitted via ED for worsening SOB, poor appetite, orthopnea and fatigue. Pmhx of CAD s/p CABG, PAD, DM 2, recent admission for HFmrEF earlier this month. Serial TTEs show progressive decline in EF since 3/2021 with the most recent EF at 25-30%. Recent LHC shows diffuse CAD and no interventions indicated. Patient had [place] recently and there is some thought to myocarditis or other etiology causing worsening HF. [facility] was consulted for further evaluation and management of HFrEF.        CARDIAC IMAGING:  Echo 1/9/23   Left Ventricle: Severely reduced left ventricular systolic function with a visually estimated EF of 25 - 30%. Left ventricle size is normal. Mildly increased wall thickness. Echo 12/26/22    Left Ventricle: Severely reduced left ventricular systolic function with a visually estimated EF of 25 - 30%. Left ventricle size is normal. Normal wall thickness. There are regional wall motion abnormalities. Grade II diastolic dysfunction with increased LAP. Right Ventricle: Moderately reduced systolic function. TAPSE is abnormal. TAPSE is 1.1 cm. Aortic Valve: Mild stenosis of the aortic valve. AV peak gradient is 13 mmHg. AV peak velocity is 1.8 m/s. Mitral Valve: Not well visualized. Moderate annular calcification at the posterior leaflet of the mitral valve. Mild to moderate regurgitation. Tricuspid Valve: Mildly elevated RVSP. Left Atrium: Left atrium is moderately dilated. 12/8/22    Left Ventricle: Moderately reduced left ventricular systolic function with a visually estimated EF of 35 - 40%. Severe hypokinesis of the following segments: mid anteroseptal, apical anterior, apical septal, apical inferior and apical lateral. Severe hypokinesis of the apex. Mitral Valve: Severely thickened leaflet, at the anterior and posterior leaflets. Severely calcified leaflet, at the anterior and posterior leaflets. Mild annular calcification of the mitral valve. Moderate regurgitation. Left Atrium: Left atrium is mildly dilated. Contrast used: Definity. limited study     EKG 12/22/22 ST, Biatria enlargement, marked ST abnormality     C 12/6/22  1. Normal LVEDP  2. Severe native multivessel coronary artery disease  3. Patent LIMA to LAD and vein graft to distal RCA  4. Recurrent ISR in OM1 stent with now 60 to 70% restenosis  5. Recoil of left main and circumflex stent with now recurrent 40 to 50% stenosis. 6.  Progression of ostial left main disease now to about 60% stenosis  7.   Progression of disease in jailed first marginal branch now with diffuse 90% stenosis  8. High-grade stenosis in the mid to distal right potential femoral artery treated with 6 x 40 mm impact drug-coated balloon angioplasty to reduce the stenosis to less than 40%     NST        HEMODYNAMICS:  RHC 1/9/23  PA 20/9, RA 3, PCWP 8, CI 1.8     CPEST too ill   6MW 300 feet       PHYSICAL EXAM:  Visit Vitals  BP (!) 104/37   Pulse 98   Temp 97.9 °F (36.6 °C)   Resp 16   Ht 5' 9\" (1.753 m)   Wt 115 lb 8.3 oz (52.4 kg)   SpO2 98%   BMI 17.06 kg/m²     General: Patient is well developed, well-nourished in no acute distress  HEENT: Unremarkable   Neck: Supple. No evidence of thyroid enlargements or lymphadenopathy. JVD: Cannot be appreciated   Lungs: Breath sounds are equal and clear bilaterally. No wheezes, rhonchi, or rales. Heart: Regular rate and rhythm with normal S1 and S2. No murmurs, gallops or rubs. Abdomen: Soft, no mass or tenderness. No organomegaly or hernia. Bowel sounds present. Genitourinary and rectal: deferred  Extremities: No cyanosis, clubbing, or edema. Neurologic: No focal sensory or motor deficits are noted. Grossly intact. Psychiatric: Awake, alert an doriented x 3. Appropriate mood and affect. Skin: Warm, dry and well perfused. REVIEW OF SYSTEMS:  General: Denies fever. Ear, nose and throat: Denies difficulty hearing, sinus problems, nosebleeds  Cardiovascular: see above in the interval history  Respiratory: Denies cough, wheezing, sputum production, hemoptysis.   Gastrointestinal: Denies heartburn, constipation, diarrhea, abdominal pain, nausea, blood in stool  Kidney and bladder: Denies painful urination, frequent urination  Musculoskeletal: Denies joint pain, muscle weakness  Skin and hair: Denies change in existing skin lesions    PAST MEDICAL HISTORY:  Past Medical History:   Diagnosis Date    CAD (coronary artery disease) 11/10/2016    NSTEMI & 2 stents    Deafness 10/28/2012    DM (diabetes mellitus) ([facility] [address] Elevated cholesterol     Hypertension     NSTEMI (non-ST elevated myocardial infarction) ([facility] [address] 11/10/2016       PAST SURGICAL HISTORY:  Past Surgical History:   Procedure Laterality Date    COLONOSCOPY N/A 6/28/2018    COLONOSCOPY performed by Alonso Boggs MD at [facility] ENDOSCOPY    [address] UNLIST  11/11/2016    2 stents       FAMILY HISTORY:  Family History   Problem Relation Age of Onset    Heart Disease Father     Heart Attack Father     Hypertension Mother     Elevated Lipids Brother     Elevated Lipids Brother     No Known Problems Sister     Elevated Lipids Brother     No Known Problems Son     No Known Problems Daughter     Anesth Problems Neg Hx        SOCIAL HISTORY:  Social History     Socioeconomic History    Marital status:    Tobacco Use    Smoking status: Never     Passive exposure: Never    Smokeless tobacco: Never   Vaping Use    Vaping Use: Never used   Substance and Sexual Activity    Alcohol use: Yes     Alcohol/week: 2.0 standard drinks     Types: 1 Cans of beer, 1 Shots of liquor per week     Comment: rarely    Drug use: No    Sexual activity: Yes     Social Determinants of Health     Financial Resource Strain: Medium Risk    Difficulty of Paying Living Expenses: Somewhat hard   Food Insecurity: Food Insecurity Present    Worried About Running Out of Food in the Last Year: Never true    Ran Out of Food in the Last Year: Often true       LABORATORY RESULTS:     Labs Latest Ref Rng & Units 1/17/2023 1/16/2023 1/15/2023 1/14/2023 1/13/2023 1/12/2023 1/11/2023   WBC 4.1 - 11.1 K/uL 5.8 - 5.8 - 5.7 - 6.6   RBC 4.10 - 5.70 M/uL 3.80(L) - 3.66(L) - 3.55(L) - 3.65(L)   Hemoglobin 12.1 - 17.0 g/dL 9.2(L) - 8. 7(L) - 8. 5(L) - 8. 5(L)   Hematocrit 36.6 - 50.3 % 31. 4(L) - 30. 3(L) - 28. 5(L) - 29. 4(L)   MCV 80.0 - 99.0 FL 82.6 - 82.8 - 80.3 - 80.5   Platelets 708 - 305 K/uL 238 - 258 - 264 - 276   Lymphocytes 12 - 49 % - - - - - - -   Monocytes 5 - 13 % - - - - - - - Eosinophils 0 - 7 % - - - - - - -   Basophils 0 - 1 % - - - - - - -   Albumin 3.5 - 5.0 g/dL 3.4(L) 2. 9(L) 2. 8(L) 2. 9(L) 3.0(L) 3. 3(L) 3.0(L)   Calcium 8.5 - 10.1 MG/DL 9.7 9.0 9.1 9.2 9.4 9.7 9.2   Glucose 65 - 100 mg/dL 168(H) 192(H) 224(H) 162(H) 106(H) 118(H) 146(H)   BUN 6 - 20 MG/DL 26(H) 25(H) 28(H) 29(H) 29(H) 27(H) 27(H)   Creatinine 0.70 - 1.30 MG/DL 1.48(H) 1.32(H) 1.36(H) 1.29 1.28 1.29 1.20   Sodium 136 - 145 mmol/L 136 134(L) 135(L) 136 135(L) 135(L) 136   Potassium 3.5 - 5.1 mmol/L 5.3(H) 5.0 4.8 4.5 4.6 4.5 4.6   TSH 0.36 - 3.74 uIU/mL - - - - - - -   PSA 0.01 - 4.0 ng/mL 2.2 - - - - - -   LDH 85 - 241 U/L - - - - - 189 -   Some recent data might be hidden     Lab Results   Component Value Date/Time    TSH 2.12 12/27/2022 02:36 PM    TSH 4.80 (H) 12/06/2022 03:53 AM    TSH 5.39 (H) 10/12/2022 09:10 AM    TSH 3.53 02/03/2022 11:47 AM    TSH 5.790 (H) 11/21/2019 04:45 PM    TSH 3.08 06/22/2018 01:53 PM    TSH 4.250 05/26/2015 09:43 AM       ALLERGY:  No Known Allergies     CURRENT MEDICATIONS:    Current Facility-Administered Medications:     peg 3350-Electrolytes-Vit C (MOVIPREP) oral solution 2 L, 2 L, Oral, ONCE, Rissa Camp NP    digoxin (LANOXIN) tablet 0.125 mg, 0.125 mg, Oral, EVERY OTHER DAY, Zia Lee MD, 0.125 mg at 01/16/23 0834    milrinone (PRIMACOR) 20 MG/100 ML D5W infusion, 0.2 mcg/kg/min, IntraVENous, CONTINUOUS, Zia Lee MD, Last Rate: 3.1 mL/hr at 01/16/23 0835, 0.2 mcg/kg/min at 01/16/23 0835    midodrine (PROAMATINE) tablet 10 mg, 10 mg, Oral, TID WITH MEALS, Alyse Bolton MD, 10 mg at 01/17/23 8551    finasteride (PROSCAR) tablet 5 mg, 5 mg, Oral, DAILY WITH DINNER, Alyse Bolton MD, 5 mg at 01/16/23 1716    insulin lispro (HUMALOG) injection 6 Units, 6 Units, SubCUTAneous, TID WITH MEALS, Cathy Sheldon, CNS, 6 Units at 01/17/23 0917    ivabradine (CORLANOR) tablet 2.5 mg, 2.5 mg, Oral, BID WITH MEALS, Lizette Linton NP, 2.5 mg at 01/17/23 5056    insulin glargine (LANTUS) injection 6 Units, 6 Units, SubCUTAneous, QHS, Cathy Sheldon, CNS, 6 Units at 01/16/23 2033    insulin lispro (HUMALOG) injection, , SubCUTAneous, AC&HS, Cathy Sheldon, SLICK, 2 Units at 01/16/23 1717    dextrose 10 % infusion 0-250 mL, 0-250 mL, IntraVENous, PRN, Cathy Sheldon, CNS    spironolactone (ALDACTONE) tablet 12.5 mg, 12.5 mg, Oral, DAILY, Lizette Linton NP, 12.5 mg at 01/17/23 0917    melatonin tablet 3 mg, 3 mg, Oral, QHS PRN, Modesta Andino NP, 3 mg at 01/15/23 2116    [Held by provider] empagliflozin (JARDIANCE) tablet 10 mg, 10 mg, Oral, DAILY, Jo Jaffe MD, 10 mg at 01/14/23 0929    dextrose 10 % infusion 0-250 mL, 0-250 mL, IntraVENous, PRN, Cathy Sheldon, SLICK    albuterol-ipratropium (DUO-NEB) 2.5 MG-0.5 MG/3 ML, 3 mL, Nebulization, Q6H PRN, Tracey Dias MD    mirtazapine (REMERON) tablet 7.5 mg, 7.5 mg, Oral, QHS, Sue Herrera MD, 7.5 mg at 01/16/23 2034    pantoprazole (PROTONIX) tablet 40 mg, 40 mg, Oral, ACB, Walterine Stage, DO, 40 mg at 01/17/23 0716    heparin (porcine) injection 5,000 Units, 5,000 Units, SubCUTAneous, Q12H, Walterine Stage, DO, 5,000 Units at 01/17/23 4113    QUEtiapine (SEROquel) tablet 50 mg, 50 mg, Oral, QHS, David Aguilar G, DO, 50 mg at 01/16/23 2033    lidocaine 4 % patch 1 Patch, 1 Patch, TransDERmal, Q24H, Cara Ruiz MD, 1 Patch at 01/16/23 1717    balsam peru-castor oiL (VENELEX) ointment, , Topical, BID, Theresa Abebe MD, Given at 01/17/23 0918    alteplase (CATHFLO) 1 mg in sterile water (preservative free) 1 mL injection, 1 mg, InterCATHeter, PRN, Cara Ruiz MD    bacitracin 500 unit/gram packet 1 Packet, 1 Packet, Topical, PRN, Lesly MOLINA MD    glucose chewable tablet 16 g, 4 Tablet, Oral, PRN, Lesly MOLINA MD    glucagon (GLUCAGEN) injection 1 mg, 1 mg, IntraMUSCular, PRN, Cara Ruiz MD    senna-docusate (PERICOLACE) 8.6-50 mg per tablet 1 Tablet, 1 Tablet, Oral, BID PRN, Mikhail Ba MD, 1 Tablet at 12/26/22 8113    bisacodyL (DULCOLAX) suppository 10 mg, 10 mg, Rectal, QHS PRN, Ramos MOLINA MD    sodium chloride (NS) flush 5-40 mL, 5-40 mL, IntraVENous, Q8H, Walker Aponte MD, 10 mL at 01/17/23 0856    sodium chloride (NS) flush 5-40 mL, 5-40 mL, IntraVENous, PRN, Mikhail Ba MD, 10 mL at 12/28/22 0845    acetaminophen (TYLENOL) tablet 650 mg, 650 mg, Oral, Q6H PRN, 650 mg at 01/15/23 2116 **OR** acetaminophen (TYLENOL) suppository 650 mg, 650 mg, Rectal, Q6H PRN, Walker Rose MD    polyethylene glycol (MIRALAX) packet 17 g, 17 g, Oral, DAILY PRN, Mikhail Ba MD, 17 g at 12/26/22 0649    ondansetron (ZOFRAN ODT) tablet 4 mg, 4 mg, Oral, Q8H PRN **OR** ondansetron (ZOFRAN) injection 4 mg, 4 mg, IntraVENous, Q6H PRN, Ramos MOLINA MD, 4 mg at 01/16/23 2155    aspirin delayed-release tablet 81 mg, 81 mg, Oral, DAILY, Michel, Vincent Tripp MD, 81 mg at 01/17/23 0917    ipratropium (ATROVENT) 21 mcg (0.03 %) nasal spray 2 Spray, 2 Spray, Both Nostrils, Q12H, Alejandra Sampson MD, 2 Spray at 01/17/23 0919    sodium chloride (NS) flush 5-40 mL, 5-40 mL, IntraVENous, PRN, Mikhail Ba MD    PATIENT CARE TEAM:  Patient Care Team:  Louis Urias MD as PCP - General (Family Medicine)  Louis Urias MD as PCP - [address] [place] [address] [facility]  Mario Marcelino MD (Cardiovascular Disease Physician)  Marcy Maloney MD (Gastroenterology)  Clara Carcamo MD (Cardiothoracic Surgery)  Joel Mcgee MD (Cardiovascular Disease Physician)  Lucie Davenport MD (Nephrology)  Nataly Alaniz MD (Pulmonary Disease)     Thank you for allowing me to participate in this patient's care.     Nellie Monteiro MD   [address] [place] [address], [address]  Phone: [phone]

## 2023-01-17 NOTE — PROGRESS NOTES
[address] [facility] Adult  Hospitalist Group                                                                                          Hospitalist Progress Note  Ayde Lim MD  Answering service: [phone] OR 36 from in house phone        Date of Service:  2023  NAME:  Lorenzo Foreman  :  1950  MRN:  [id number]      Admission Summary:   Lorenzo Foreman is a 71 y.o. male who presents with progressively worsening shortness of breath for past several days. It has gotten worse to the point that he can only ambulate a few steps due to severe dyspnea and near syncope. Patient also noted abdominal distention which is increasing. Patient with known history of cardiomyopathy and recently admitted for CHF exacerbation a week ago. On presentation to the ER, chest x-ray shows diffuse airspace disease atypical infection versus edema. Also patient was noted to have elevated troponin and BNP. Patient was further evaluated by CT abdomen and pelvis which shows massively distended bladder with some bilateral hydronephrosis, subsequently Vasquez was placed. Patient also with significant leukocytosis as well as tachycardic and elevated lactic acid level and subsequently code sepsis was called. Hospitalist service requested admit the patient for further evaluation management.      The patient denies any headache, blurry vision, sore throat, trouble swallowing, trouble with speech, chest pain, SOB, cough, fever, chills, N/V/D, abd pain, urinary symptoms, constipation, recent travels, sick contacts, focal or generalized neurological symptoms, falls, injuries, rashes, contact with COVID-19 diagnosed patients, hematemesis, melena, hemoptysis, hematuria, rashes, denies starting any new medications and denies any other concerns or problems besides as mentioned above       Interval history / Subjective:     Patient seen and examined , reviewed his vitals,labs,careplan  Plans for DC home with PICC and home IV inotropes  Hematuria resolved  urology rec noted     Assessment & Plan:     Acute on chronic systolic congestive heart failure   Cardiogenic shock due to above - off dbutamine on milrinone  CAD   S/p CABG   Ischemic Cardiomyopathy    hypotension today- held duretics and 500ns bolus given w albumin  Appreciate help from Heart failure team.   S/p RHC - with normal to low filling pressures but mildly reduced CI. PICC line placed for home milrinone     TANNER on CKD stage III   Improving - stable cr  Monitor daily weight , I/O   holding diuretics today     GERD -chronic   No change in current Rx. Continue PPI     Depression-stable  Hospital delirium  -resolved  Continue Seroquel as is QHS - at 50mg   Continue Remeron as was PTA      T2DM -on Insulin   Controlled Romain diet   Insulin NPH Six units BID      BPH -with urinary retention  Continue proscar and donnelly on DC  Hematuria resolved urology recs noted with follow up appt  Stopped flomax due to persistent hypotension     PAD   S/p Right SFA PTCA -followed by Dr. Fredy Lassiter   Normal MARIANELA     Plans for EGD and colonoscopy on WED as part of the LVAD workup   -plavix on hold for procedure     Code status: DNR  Prophylaxis: heparin   Care Plan discussed with: pt, RN   Anticipated Disposition: greater than 48 hours,   Being worked up and considered for possible [id number] Problems  Date Reviewed: 12/5/2022            Codes Class Noted POA    Systolic CHF, acute on chronic ([facility] ICD-10-CM: I50.23  ICD-9-CM: 428.23, 428.0  12/29/2022 Yes        Receiving inotropic medication ICD-10-CM: Z79.899  ICD-9-CM: V49.89  12/29/2022 Unknown        Sepsis ([facility] ICD-10-CM: A41.9  ICD-9-CM: 038.9, 995.91  12/22/2022 Unknown       Review of Systems:   A comprehensive review of systems was negative except for that written in the HPI. Vital Signs:    Last 24hrs VS reviewed since prior progress note.  Most recent are:  Visit Vitals  BP (!) 117/31 (BP 1 Location: Left arm, BP Patient Position: At rest)   Pulse 88   Temp 98 °F (36.7 °C)   Resp 18   Ht 5' 9\" (1.753 m)   Wt 52.9 kg (116 lb 10 oz)   SpO2 98%   BMI 17.22 kg/m²     Patient Vitals for the past 24 hrs:   Temp Pulse Resp BP SpO2   01/16/23 2000 -- 88 -- -- --   01/16/23 1907 98 °F (36.7 °C) 83 18 (!) 117/31 98 %   01/16/23 1800 -- 94 -- -- --   01/16/23 1600 -- 87 -- -- --   01/16/23 1455 98 °F (36.7 °C) 81 18 (!) 124/37 98 %   01/16/23 1400 -- 75 -- -- --   01/16/23 1201 -- 81 -- -- --   01/16/23 1200 -- 80 -- -- --   01/16/23 1117 98 °F (36.7 °C) 82 18 (!) 113/40 98 %   01/16/23 1000 -- 84 -- -- --   01/16/23 0835 -- -- -- -- 98 %   01/16/23 0718 98.8 °F (37.1 °C) 78 16 (!) 131/46 98 %   01/16/23 0546 -- 86 -- -- --   01/16/23 0511 98.2 °F (36.8 °C) 85 16 (!) 125/49 97 %   01/16/23 0349 -- 85 -- -- --   01/16/23 0158 -- 83 -- -- --   01/16/23 0052 98.6 °F (37 °C) 78 18 (!) 118/45 98 %   01/15/23 2152 -- 88 -- -- --          Intake/Output Summary (Last 24 hours) at 1/16/2023 2144  Last data filed at 1/16/2023 1600  Gross per 24 hour   Intake 774.4 ml   Output 1800 ml   Net -1025.6 ml          Physical Examination:     I had a face to face encounter with this patient and independently examined them on 1/16/2023 as outlined below:          Constitutional:  No acute distress, , pleasant, frail elderly male   ENT:  Oral mucosa dry  EOMI    Resp:  CTA bilaterally. No wheezing/rhonchi/rales. No accessory muscle use. CV:  Regular rhythm, normal rate, no murmurs, gallops, rubs    GI:  Soft, non distended, non tender. Bs+    Musculoskeletal:  No edema, warm, 2+ pulses throughout    Neurologic:  Moves all extremities.   Awake and alert, CN II-XII reviewed            Data Review:    Review and/or order of clinical lab test  Review and/or order of tests in the radiology section of CPT  Review and/or order of tests in the medicine section of CPT      Labs:     Recent Labs     01/15/23  0204   WBC 5.8   HGB 8.7*   HCT 30.3*          Recent Labs     01/16/23  0054 01/15/23  0204 01/14/23  0543   * 135* 136   K 5.0 4.8 4.5    106 107   CO2 23 24 24   BUN 25* 28* 29*   CREA 1.32* 1.36* 1.29   * 224* 162*   CA 9.0 9.1 9.2   MG 2.2  --  2.4   PHOS 3.2  --  3.7       Recent Labs     01/16/23  0054 01/15/23  0204 01/14/23  0543   ALT 47 47 52   * 174* 174*   TBILI 0.5 0.3 0.4   TP 5.9* 6.3* 6.0*   ALB 2.9* 2.8* 2.9*   GLOB 3.0 3.5 3.1       No results for input(s): INR, PTP, APTT, INREXT, INREXT in the last 72 hours. No results for input(s): FE, TIBC, PSAT, FERR in the last 72 hours. Lab Results   Component Value Date/Time    Folate 10.5 07/03/2018 09:24 AM        No results for input(s): PH, PCO2, PO2 in the last 72 hours. No results for input(s): CPK, CKNDX, TROIQ in the last 72 hours.     No lab exists for component: CPKMB    Lab Results   Component Value Date/Time    Cholesterol, total 170 01/12/2023 05:00 AM    HDL Cholesterol 40 01/12/2023 05:00 AM    LDL, calculated 87 01/12/2023 05:00 AM    Triglyceride 215 (H) 01/12/2023 05:00 AM    CHOL/HDL Ratio 4.3 01/12/2023 05:00 AM     Lab Results   Component Value Date/Time    Glucose (POC) 150 (H) 01/16/2023 08:28 PM    Glucose (POC) 247 (H) 01/16/2023 04:09 PM    Glucose (POC) 263 (H) 01/16/2023 11:16 AM    Glucose (POC) 138 (H) 01/16/2023 07:19 AM    Glucose (POC) 234 (H) 01/15/2023 09:25 PM     Lab Results   Component Value Date/Time    Color YELLOW/STRAW 01/01/2023 09:13 AM    Appearance CLEAR 01/01/2023 09:13 AM    Specific gravity 1.013 01/01/2023 09:13 AM    Specific gravity 1.020 05/03/2014 08:18 AM    pH (UA) 5.5 01/01/2023 09:13 AM    Protein 30 (A) 01/01/2023 09:13 AM    Glucose Negative 01/01/2023 09:13 AM    Ketone Negative 01/01/2023 09:13 AM    Bilirubin Negative 01/01/2023 09:13 AM    Urobilinogen 1.0 01/01/2023 09:13 AM    Nitrites Negative 01/01/2023 09:13 AM    Leukocyte Esterase Negative 01/01/2023 09:13 AM    Epithelial cells FEW 01/01/2023 09:13 AM Bacteria Negative 01/01/2023 09:13 AM    WBC 0-4 01/01/2023 09:13 AM    RBC 0-5 01/01/2023 09:13 AM         Medications Reviewed:     Current Facility-Administered Medications   Medication Dose Route Frequency    digoxin (LANOXIN) tablet 0.125 mg  0.125 mg Oral EVERY OTHER DAY    milrinone (PRIMACOR) 20 MG/100 ML D5W infusion  0.2 mcg/kg/min IntraVENous CONTINUOUS    midodrine (PROAMATINE) tablet 10 mg  10 mg Oral TID WITH MEALS    finasteride (PROSCAR) tablet 5 mg  5 mg Oral DAILY WITH DINNER    insulin lispro (HUMALOG) injection 6 Units  6 Units SubCUTAneous TID WITH MEALS    ivabradine (CORLANOR) tablet 2.5 mg  2.5 mg Oral BID WITH MEALS    insulin glargine (LANTUS) injection 6 Units  6 Units SubCUTAneous QHS    insulin lispro (HUMALOG) injection   SubCUTAneous AC&HS    dextrose 10 % infusion 0-250 mL  0-250 mL IntraVENous PRN    spironolactone (ALDACTONE) tablet 12.5 mg  12.5 mg Oral DAILY    melatonin tablet 3 mg  3 mg Oral QHS PRN    [Held by provider] empagliflozin (JARDIANCE) tablet 10 mg  10 mg Oral DAILY    dextrose 10 % infusion 0-250 mL  0-250 mL IntraVENous PRN    albuterol-ipratropium (DUO-NEB) 2.5 MG-0.5 MG/3 ML  3 mL Nebulization Q6H PRN    mirtazapine (REMERON) tablet 7.5 mg  7.5 mg Oral QHS    pantoprazole (PROTONIX) tablet 40 mg  40 mg Oral ACB    heparin (porcine) injection 5,000 Units  5,000 Units SubCUTAneous Q12H    QUEtiapine (SEROquel) tablet 50 mg  50 mg Oral QHS    lidocaine 4 % patch 1 Patch  1 Patch TransDERmal Q24H    balsam peru-castor oiL (VENELEX) ointment   Topical BID    alteplase (CATHFLO) 1 mg in sterile water (preservative free) 1 mL injection  1 mg InterCATHeter PRN    bacitracin 500 unit/gram packet 1 Packet  1 Packet Topical PRN    glucose chewable tablet 16 g  4 Tablet Oral PRN    glucagon (GLUCAGEN) injection 1 mg  1 mg IntraMUSCular PRN    senna-docusate (PERICOLACE) 8.6-50 mg per tablet 1 Tablet  1 Tablet Oral BID PRN    bisacodyL (DULCOLAX) suppository 10 mg  10 mg Rectal QHS PRN    sodium chloride (NS) flush 5-40 mL  5-40 mL IntraVENous Q8H    sodium chloride (NS) flush 5-40 mL  5-40 mL IntraVENous PRN    acetaminophen (TYLENOL) tablet 650 mg  650 mg Oral Q6H PRN    Or    acetaminophen (TYLENOL) suppository 650 mg  650 mg Rectal Q6H PRN    polyethylene glycol (MIRALAX) packet 17 g  17 g Oral DAILY PRN    ondansetron (ZOFRAN ODT) tablet 4 mg  4 mg Oral Q8H PRN    Or    ondansetron (ZOFRAN) injection 4 mg  4 mg IntraVENous Q6H PRN    aspirin delayed-release tablet 81 mg  81 mg Oral DAILY    ipratropium (ATROVENT) 21 mcg (0.03 %) nasal spray 2 Spray  2 Spray Both Nostrils Q12H    sodium chloride (NS) flush 5-40 mL  5-40 mL IntraVENous PRN     ______________________________________________________________________  EXPECTED LENGTH OF STAY: 5d 0h  ACTUAL LENGTH OF STAY:          25                 Alejandra Solorio MD

## 2023-01-17 NOTE — PROGRESS NOTES
Problem: Diabetes Self-Management  Goal: *Disease process and treatment process  Description: Define diabetes and identify own type of diabetes; list 3 options for treating diabetes. Outcome: Progressing Towards Goal  Goal: *Incorporating nutritional management into lifestyle  Description: Describe effect of type, amount and timing of food on blood glucose; list 3 methods for planning meals. Outcome: Progressing Towards Goal  Goal: *Incorporating physical activity into lifestyle  Description: State effect of exercise on blood glucose levels. Outcome: Progressing Towards Goal  Goal: *Developing strategies to promote health/change behavior  Description: Define the ABC's of diabetes; identify appropriate screenings, schedule and personal plan for screenings. Outcome: Progressing Towards Goal  Goal: *Using medications safely  Description: State effect of diabetes medications on diabetes; name diabetes medication taking, action and side effects. Outcome: Progressing Towards Goal  Goal: *Monitoring blood glucose, interpreting and using results  Description: Identify recommended blood glucose targets  and personal targets. Outcome: Progressing Towards Goal  Goal: *Prevention, detection, treatment of acute complications  Description: List symptoms of hyper- and hypoglycemia; describe how to treat low blood sugar and actions for lowering  high blood glucose level. Outcome: Progressing Towards Goal  Goal: *Prevention, detection and treatment of chronic complications  Description: Define the natural course of diabetes and describe the relationship of blood glucose levels to long term complications of diabetes.   Outcome: Progressing Towards Goal  Goal: *Developing strategies to address psychosocial issues  Description: Describe feelings about living with diabetes; identify support needed and support network  Outcome: Progressing Towards Goal  Goal: *Insulin pump training  Outcome: Progressing Towards Goal  Goal: *Sick day guidelines  Outcome: Progressing Towards Goal  Goal: *Patient Specific Goal (EDIT GOAL, INSERT TEXT)  Outcome: Progressing Towards Goal     Problem: Falls - Risk of  Goal: *Absence of Falls  Description: Document Laverne Fall Risk and appropriate interventions in the flowsheet. Outcome: Progressing Towards Goal  Note: Fall Risk Interventions:  Mobility Interventions: Patient to call before getting OOB, PT Consult for assist device competence         Medication Interventions: Assess postural VS orthostatic hypotension, Bed/chair exit alarm, Evaluate medications/consider consulting pharmacy, Patient to call before getting OOB, Teach patient to arise slowly    Elimination Interventions: Call light in reach, Patient to call for help with toileting needs    History of Falls Interventions: Door open when patient unattended, Evaluate medications/consider consulting pharmacy         Problem: Pressure Injury - Risk of  Goal: *Prevention of pressure injury  Description: Document Mike Scale and appropriate interventions in the flowsheet.   Outcome: Progressing Towards Goal     Problem: Pain  Goal: *Control of Pain  Outcome: Progressing Towards Goal  Goal: *PALLIATIVE CARE:  Alleviation of Pain  Outcome: Progressing Towards Goal

## 2023-01-18 ENCOUNTER — ANESTHESIA (OUTPATIENT)
Dept: ENDOSCOPY | Age: 73
DRG: 871 | End: 2023-01-18
Payer: MEDICARE

## 2023-01-18 ENCOUNTER — ANESTHESIA EVENT (OUTPATIENT)
Dept: ENDOSCOPY | Age: 73
DRG: 871 | End: 2023-01-18
Payer: MEDICARE

## 2023-01-18 LAB
ALBUMIN SERPL-MCNC: 2.9 G/DL (ref 3.5–5)
ALBUMIN/GLOB SERPL: 0.9 (ref 1.1–2.2)
ALP SERPL-CCNC: 160 U/L (ref 45–117)
ALT SERPL-CCNC: 44 U/L (ref 12–78)
ANION GAP SERPL CALC-SCNC: 7 MMOL/L (ref 5–15)
AST SERPL-CCNC: 28 U/L (ref 15–37)
BASOPHILS # BLD: 0 K/UL (ref 0–0.1)
BASOPHILS NFR BLD: 1 % (ref 0–1)
BILIRUB SERPL-MCNC: 0.5 MG/DL (ref 0.2–1)
BNP SERPL-MCNC: 2837 PG/ML
BUN SERPL-MCNC: 16 MG/DL (ref 6–20)
BUN/CREAT SERPL: 14 (ref 12–20)
CALCIUM SERPL-MCNC: 9.3 MG/DL (ref 8.5–10.1)
CHLORIDE SERPL-SCNC: 111 MMOL/L (ref 97–108)
CO2 SERPL-SCNC: 22 MMOL/L (ref 21–32)
CREAT SERPL-MCNC: 1.11 MG/DL (ref 0.7–1.3)
DIFFERENTIAL METHOD BLD: ABNORMAL
DIGOXIN SERPL-MCNC: 0.6 NG/ML (ref 0.9–2)
EOSINOPHIL # BLD: 0.2 K/UL (ref 0–0.4)
EOSINOPHIL NFR BLD: 4 % (ref 0–7)
ERYTHROCYTE [DISTWIDTH] IN BLOOD BY AUTOMATED COUNT: 24.2 % (ref 11.5–14.5)
GLOBULIN SER CALC-MCNC: 3.1 G/DL (ref 2–4)
GLUCOSE BLD STRIP.AUTO-MCNC: 106 MG/DL (ref 65–117)
GLUCOSE BLD STRIP.AUTO-MCNC: 121 MG/DL (ref 65–117)
GLUCOSE BLD STRIP.AUTO-MCNC: 295 MG/DL (ref 65–117)
GLUCOSE BLD STRIP.AUTO-MCNC: 304 MG/DL (ref 65–117)
GLUCOSE SERPL-MCNC: 103 MG/DL (ref 65–100)
HCT VFR BLD AUTO: 29.3 % (ref 36.6–50.3)
HGB BLD-MCNC: 8.3 G/DL (ref 12.1–17)
IMM GRANULOCYTES # BLD AUTO: 0 K/UL (ref 0–0.04)
IMM GRANULOCYTES NFR BLD AUTO: 1 % (ref 0–0.5)
LYMPHOCYTES # BLD: 1.8 K/UL (ref 0.8–3.5)
LYMPHOCYTES NFR BLD: 38 % (ref 12–49)
MAGNESIUM SERPL-MCNC: 1.8 MG/DL (ref 1.6–2.4)
MCH RBC QN AUTO: 23.9 PG (ref 26–34)
MCHC RBC AUTO-ENTMCNC: 28.3 G/DL (ref 30–36.5)
MCV RBC AUTO: 84.4 FL (ref 80–99)
MONOCYTES # BLD: 0.4 K/UL (ref 0–1)
MONOCYTES NFR BLD: 9 % (ref 5–13)
NEUTS SEG # BLD: 2.4 K/UL (ref 1.8–8)
NEUTS SEG NFR BLD: 47 % (ref 32–75)
NRBC # BLD: 0 K/UL (ref 0–0.01)
NRBC BLD-RTO: 0 PER 100 WBC
PHOSPHATE SERPL-MCNC: 2.7 MG/DL (ref 2.6–4.7)
PLATELET # BLD AUTO: 227 K/UL (ref 150–400)
PMV BLD AUTO: 10.3 FL (ref 8.9–12.9)
POTASSIUM SERPL-SCNC: 4.5 MMOL/L (ref 3.5–5.1)
PROCALCITONIN SERPL-MCNC: 0.15 NG/ML
PROT SERPL-MCNC: 6 G/DL (ref 6.4–8.2)
RBC # BLD AUTO: 3.47 M/UL (ref 4.1–5.7)
RBC MORPH BLD: ABNORMAL
SERVICE CMNT-IMP: ABNORMAL
SERVICE CMNT-IMP: NORMAL
SODIUM SERPL-SCNC: 140 MMOL/L (ref 136–145)
WBC # BLD AUTO: 4.8 K/UL (ref 4.1–11.1)

## 2023-01-18 PROCEDURE — 94760 N-INVAS EAR/PLS OXIMETRY 1: CPT

## 2023-01-18 PROCEDURE — 82962 GLUCOSE BLOOD TEST: CPT

## 2023-01-18 PROCEDURE — 74011250636 HC RX REV CODE- 250/636: Performed by: INTERNAL MEDICINE

## 2023-01-18 PROCEDURE — 84100 ASSAY OF PHOSPHORUS: CPT

## 2023-01-18 PROCEDURE — 84145 PROCALCITONIN (PCT): CPT

## 2023-01-18 PROCEDURE — 74011000250 HC RX REV CODE- 250: Performed by: ANESTHESIOLOGY

## 2023-01-18 PROCEDURE — 88342 IMHCHEM/IMCYTCHM 1ST ANTB: CPT

## 2023-01-18 PROCEDURE — 88305 TISSUE EXAM BY PATHOLOGIST: CPT

## 2023-01-18 PROCEDURE — 65660000001 HC RM ICU INTERMED STEPDOWN

## 2023-01-18 PROCEDURE — 74011000250 HC RX REV CODE- 250: Performed by: STUDENT IN AN ORGANIZED HEALTH CARE EDUCATION/TRAINING PROGRAM

## 2023-01-18 PROCEDURE — 2709999900 HC NON-CHARGEABLE SUPPLY: Performed by: SPECIALIST

## 2023-01-18 PROCEDURE — 80162 ASSAY OF DIGOXIN TOTAL: CPT

## 2023-01-18 PROCEDURE — 88341 IMHCHEM/IMCYTCHM EA ADD ANTB: CPT

## 2023-01-18 PROCEDURE — 85025 COMPLETE CBC W/AUTO DIFF WBC: CPT

## 2023-01-18 PROCEDURE — 0DB78ZX EXCISION OF STOMACH, PYLORUS, VIA NATURAL OR ARTIFICIAL OPENING ENDOSCOPIC, DIAGNOSTIC: ICD-10-PCS | Performed by: SPECIALIST

## 2023-01-18 PROCEDURE — 0DJD8ZZ INSPECTION OF LOWER INTESTINAL TRACT, VIA NATURAL OR ARTIFICIAL OPENING ENDOSCOPIC: ICD-10-PCS | Performed by: SPECIALIST

## 2023-01-18 PROCEDURE — 74011250637 HC RX REV CODE- 250/637: Performed by: INTERNAL MEDICINE

## 2023-01-18 PROCEDURE — 74011250637 HC RX REV CODE- 250/637: Performed by: FAMILY MEDICINE

## 2023-01-18 PROCEDURE — 80053 COMPREHEN METABOLIC PANEL: CPT

## 2023-01-18 PROCEDURE — 76060000032 HC ANESTHESIA 0.5 TO 1 HR: Performed by: SPECIALIST

## 2023-01-18 PROCEDURE — 99233 SBSQ HOSP IP/OBS HIGH 50: CPT | Performed by: INTERNAL MEDICINE

## 2023-01-18 PROCEDURE — 83880 ASSAY OF NATRIURETIC PEPTIDE: CPT

## 2023-01-18 PROCEDURE — 77030039825 HC MSK NSL PAP SUPERNO2VA VYRM -B: Performed by: ANESTHESIOLOGY

## 2023-01-18 PROCEDURE — 74011250637 HC RX REV CODE- 250/637: Performed by: NURSE PRACTITIONER

## 2023-01-18 PROCEDURE — 77030021593 HC FCPS BIOP ENDOSC BSC -A: Performed by: SPECIALIST

## 2023-01-18 PROCEDURE — 76040000007: Performed by: SPECIALIST

## 2023-01-18 PROCEDURE — 74011000250 HC RX REV CODE- 250: Performed by: SPECIALIST

## 2023-01-18 PROCEDURE — 74011636637 HC RX REV CODE- 636/637: Performed by: CLINICAL NURSE SPECIALIST

## 2023-01-18 PROCEDURE — 83735 ASSAY OF MAGNESIUM: CPT

## 2023-01-18 PROCEDURE — 74011250637 HC RX REV CODE- 250/637: Performed by: STUDENT IN AN ORGANIZED HEALTH CARE EDUCATION/TRAINING PROGRAM

## 2023-01-18 PROCEDURE — 88360 TUMOR IMMUNOHISTOCHEM/MANUAL: CPT

## 2023-01-18 PROCEDURE — 74011250636 HC RX REV CODE- 250/636: Performed by: ANESTHESIOLOGY

## 2023-01-18 RX ORDER — EPINEPHRINE 0.1 MG/ML
1 INJECTION INTRACARDIAC; INTRAVENOUS
Status: DISCONTINUED | OUTPATIENT
Start: 2023-01-18 | End: 2023-01-18 | Stop reason: HOSPADM

## 2023-01-18 RX ORDER — LIDOCAINE HYDROCHLORIDE 20 MG/ML
INJECTION, SOLUTION EPIDURAL; INFILTRATION; INTRACAUDAL; PERINEURAL AS NEEDED
Status: DISCONTINUED | OUTPATIENT
Start: 2023-01-18 | End: 2023-01-18 | Stop reason: HOSPADM

## 2023-01-18 RX ORDER — PROPOFOL 10 MG/ML
INJECTION, EMULSION INTRAVENOUS AS NEEDED
Status: DISCONTINUED | OUTPATIENT
Start: 2023-01-18 | End: 2023-01-18 | Stop reason: HOSPADM

## 2023-01-18 RX ORDER — SODIUM CHLORIDE 9 MG/ML
INJECTION, SOLUTION INTRAVENOUS
Status: DISCONTINUED | OUTPATIENT
Start: 2023-01-18 | End: 2023-01-18 | Stop reason: HOSPADM

## 2023-01-18 RX ORDER — NALOXONE HYDROCHLORIDE 0.4 MG/ML
0.4 INJECTION, SOLUTION INTRAMUSCULAR; INTRAVENOUS; SUBCUTANEOUS
Status: DISCONTINUED | OUTPATIENT
Start: 2023-01-18 | End: 2023-01-18 | Stop reason: HOSPADM

## 2023-01-18 RX ORDER — DEXTROMETHORPHAN/PSEUDOEPHED 2.5-7.5/.8
1.2 DROPS ORAL
Status: DISCONTINUED | OUTPATIENT
Start: 2023-01-18 | End: 2023-01-18 | Stop reason: HOSPADM

## 2023-01-18 RX ORDER — SODIUM CHLORIDE 9 MG/ML
50 INJECTION, SOLUTION INTRAVENOUS CONTINUOUS
Status: DISPENSED | OUTPATIENT
Start: 2023-01-18 | End: 2023-01-18

## 2023-01-18 RX ORDER — PHENYLEPHRINE HCL IN 0.9% NACL 0.4MG/10ML
SYRINGE (ML) INTRAVENOUS AS NEEDED
Status: DISCONTINUED | OUTPATIENT
Start: 2023-01-18 | End: 2023-01-18 | Stop reason: HOSPADM

## 2023-01-18 RX ORDER — SODIUM CHLORIDE 0.9 % (FLUSH) 0.9 %
5-40 SYRINGE (ML) INJECTION AS NEEDED
Status: DISCONTINUED | OUTPATIENT
Start: 2023-01-18 | End: 2023-01-19 | Stop reason: HOSPADM

## 2023-01-18 RX ORDER — SODIUM CHLORIDE 0.9 % (FLUSH) 0.9 %
5-40 SYRINGE (ML) INJECTION EVERY 8 HOURS
Status: DISCONTINUED | OUTPATIENT
Start: 2023-01-18 | End: 2023-01-19 | Stop reason: HOSPADM

## 2023-01-18 RX ORDER — ATROPINE SULFATE 0.1 MG/ML
0.5 INJECTION INTRAVENOUS
Status: DISCONTINUED | OUTPATIENT
Start: 2023-01-18 | End: 2023-01-18 | Stop reason: HOSPADM

## 2023-01-18 RX ORDER — FLUMAZENIL 0.1 MG/ML
0.2 INJECTION INTRAVENOUS
Status: DISCONTINUED | OUTPATIENT
Start: 2023-01-18 | End: 2023-01-18 | Stop reason: HOSPADM

## 2023-01-18 RX ORDER — EPHEDRINE SULFATE/0.9% NACL/PF 50 MG/5 ML
SYRINGE (ML) INTRAVENOUS AS NEEDED
Status: DISCONTINUED | OUTPATIENT
Start: 2023-01-18 | End: 2023-01-18 | Stop reason: HOSPADM

## 2023-01-18 RX ADMIN — Medication 4 UNITS: at 22:28

## 2023-01-18 RX ADMIN — QUETIAPINE FUMARATE 50 MG: 25 TABLET ORAL at 22:28

## 2023-01-18 RX ADMIN — Medication 80 MCG: at 12:28

## 2023-01-18 RX ADMIN — SODIUM CHLORIDE, PRESERVATIVE FREE 10 ML: 5 INJECTION INTRAVENOUS at 05:40

## 2023-01-18 RX ADMIN — IPRATROPIUM BROMIDE 2 SPRAY: 21 SPRAY, METERED NASAL at 08:55

## 2023-01-18 RX ADMIN — DIGOXIN 0.12 MG: 125 TABLET ORAL at 09:01

## 2023-01-18 RX ADMIN — Medication 80 MCG: at 12:32

## 2023-01-18 RX ADMIN — Medication: at 08:55

## 2023-01-18 RX ADMIN — PROPOFOL 25 MG: 10 INJECTION, EMULSION INTRAVENOUS at 12:28

## 2023-01-18 RX ADMIN — Medication 5 UNITS: at 17:47

## 2023-01-18 RX ADMIN — SODIUM CHLORIDE, PRESERVATIVE FREE 10 ML: 5 INJECTION INTRAVENOUS at 22:29

## 2023-01-18 RX ADMIN — Medication 6 UNITS: at 17:46

## 2023-01-18 RX ADMIN — MILRINONE LACTATE IN DEXTROSE 0.2 MCG/KG/MIN: 200 INJECTION, SOLUTION INTRAVENOUS at 01:16

## 2023-01-18 RX ADMIN — Medication 80 MCG: at 12:22

## 2023-01-18 RX ADMIN — IVABRADINE 2.5 MG: 5 TABLET, FILM COATED ORAL at 08:55

## 2023-01-18 RX ADMIN — LIDOCAINE HYDROCHLORIDE 80 MG: 20 INJECTION, SOLUTION EPIDURAL; INFILTRATION; INTRACAUDAL; PERINEURAL at 12:04

## 2023-01-18 RX ADMIN — PROPOFOL 25 MG: 10 INJECTION, EMULSION INTRAVENOUS at 12:14

## 2023-01-18 RX ADMIN — FINASTERIDE 5 MG: 5 TABLET, FILM COATED ORAL at 17:46

## 2023-01-18 RX ADMIN — INSULIN GLARGINE 6 UNITS: 100 INJECTION, SOLUTION SUBCUTANEOUS at 22:28

## 2023-01-18 RX ADMIN — Medication 160 MCG: at 12:12

## 2023-01-18 RX ADMIN — PROPOFOL 25 MG: 10 INJECTION, EMULSION INTRAVENOUS at 12:24

## 2023-01-18 RX ADMIN — Medication 80 MCG: at 12:25

## 2023-01-18 RX ADMIN — PROPOFOL 25 MG: 10 INJECTION, EMULSION INTRAVENOUS at 12:20

## 2023-01-18 RX ADMIN — SODIUM CHLORIDE, PRESERVATIVE FREE 5 ML: 5 INJECTION INTRAVENOUS at 14:48

## 2023-01-18 RX ADMIN — IVABRADINE 2.5 MG: 5 TABLET, FILM COATED ORAL at 17:46

## 2023-01-18 RX ADMIN — PROPOFOL 50 MG: 10 INJECTION, EMULSION INTRAVENOUS at 12:05

## 2023-01-18 RX ADMIN — Medication: at 17:47

## 2023-01-18 RX ADMIN — IPRATROPIUM BROMIDE 2 SPRAY: 21 SPRAY, METERED NASAL at 22:29

## 2023-01-18 RX ADMIN — Medication 80 MCG: at 12:16

## 2023-01-18 RX ADMIN — MIRTAZAPINE 7.5 MG: 15 TABLET, FILM COATED ORAL at 22:28

## 2023-01-18 RX ADMIN — ASPIRIN 81 MG: 81 TABLET, COATED ORAL at 08:54

## 2023-01-18 RX ADMIN — Medication 80 MCG: at 12:05

## 2023-01-18 RX ADMIN — Medication 5 MG: at 12:27

## 2023-01-18 RX ADMIN — PROPOFOL 25 MG: 10 INJECTION, EMULSION INTRAVENOUS at 12:10

## 2023-01-18 RX ADMIN — SODIUM CHLORIDE: 900 INJECTION, SOLUTION INTRAVENOUS at 12:02

## 2023-01-18 NOTE — PROGRESS NOTES
[address] [facility] Adult  Hospitalist Group                                                                                          Hospitalist Progress Note  René Sharp MD  Answering service: [phone] from in house phone        Date of Service:  2023  NAME:  Raghu Shah  :  1950  MRN:  [id number]      Admission Summary:   Raghu Shah is a 71 y.o. male who presents with progressively worsening shortness of breath for past several days. It has gotten worse to the point that he can only ambulate a few steps due to severe dyspnea and near syncope. Patient also noted abdominal distention which is increasing. Patient with known history of cardiomyopathy and recently admitted for CHF exacerbation a week ago. On presentation to the ER, chest x-ray shows diffuse airspace disease atypical infection versus edema. Also patient was noted to have elevated troponin and BNP. Patient was further evaluated by CT abdomen and pelvis which shows massively distended bladder with some bilateral hydronephrosis, subsequently Vasquez was placed. Patient also with significant leukocytosis as well as tachycardic and elevated lactic acid level and subsequently code sepsis was called. Hospitalist service requested admit the patient for further evaluation management.      The patient denies any headache, blurry vision, sore throat, trouble swallowing, trouble with speech, chest pain, SOB, cough, fever, chills, N/V/D, abd pain, urinary symptoms, constipation, recent travels, sick contacts, focal or generalized neurological symptoms, falls, injuries, rashes, contact with COVID-19 diagnosed patients, hematemesis, melena, hemoptysis, hematuria, rashes, denies starting any new medications and denies any other concerns or problems besides as mentioned above       Interval history / Subjective:     Patient seen and examined , reviewed his vitals,labs,careplan  Plans for DC home with PICC and home IV inotropes  Hematuria resolved  urology rec noted  EGD/colonscopy planned for today- hold heparin     Assessment & Plan:     Acute on chronic systolic congestive heart failure   Cardiogenic shock due to above - off dobutamine on milrinone  CAD   S/p CABG   Ischemic Cardiomyopathy    hypotension today- held duretics and 500ns bolus given w albumin  Appreciate help from Heart failure team.   S/p RHC - with normal to low filling pressures but mildly reduced CI. PICC line placed for home milrinone     TANNER on CKD stage III   Improving - normal creatinine with holding diuretics and after some IVFluids given  Monitor daily weight , I/O   Cont holding diuretics today     GERD -chronic   No change in current Rx.    Continue PPI     Depression-stable  Hospital delirium  -resolved  Continue Seroquel as is QHS - at 50mg   Continue Remeron as was PTA      T2DM -on Insulin   Controlled Romain diet   Insulin NPH Six units BID      BPH -with urinary retention  Continue proscar and donnelly on DC  Hematuria resolved urology recs noted with follow up appt  Stopped flomax due to persistent hypotension     PAD   S/p Right SFA PTCA -followed by Dr. Claudia Iqbal   Normal MARIANELA     Plans for EGD and colonoscopy today as part of the LVAD workup   -plavix on hold for procedure    Mild but persistently elevated alk phos of unclear significance  No liver or bile/pancreas issues noted on scan and other Liver labs are normal     Code status: Full Code  Prophylaxis: heparin   Care Plan discussed with: pt,   Anticipated Disposition: greater than 48 hours,   Being worked up and considered for possible LVAD     Hospital Problems  Date Reviewed: 12/5/2022            Codes Class Noted POA    Systolic CHF, acute on chronic ([facility] ICD-10-CM: I50.23  ICD-9-CM: 428.23, 428.0  12/29/2022 Yes        Receiving inotropic medication ICD-10-CM: Z79.899  ICD-9-CM: V49.89  12/29/2022 Unknown        Sepsis ([facility] ICD-10-CM: A41.9  ICD-9-CM: 038.9, 995.91  12/22/2022 Unknown Review of Systems:   A comprehensive review of systems was negative except for that written in the HPI. Vital Signs:    Last 24hrs VS reviewed since prior progress note. Most recent are:  Visit Vitals  BP (!) 134/47   Pulse (!) 101   Temp 97.5 °F (36.4 °C)   Resp 16   Ht 5' 9\" (1.753 m)   Wt 52.8 kg (116 lb 6.5 oz)   SpO2 98%   BMI 17.19 kg/m²     Patient Vitals for the past 24 hrs:   Temp Pulse Resp BP SpO2   01/18/23 0713 97.5 °F (36.4 °C) (!) 101 16 (!) 134/47 98 %   01/18/23 0600 -- 92 -- -- --   01/18/23 0454 97.6 °F (36.4 °C) 97 14 (!) 117/46 99 %   01/18/23 0400 -- 99 -- -- --   01/18/23 0324 -- -- -- -- 98 %   01/18/23 0200 -- 94 -- -- --   01/18/23 0030 97.4 °F (36.3 °C) 92 16 (!) 123/55 99 %   01/17/23 2200 -- 100 -- -- --   01/17/23 2044 97.3 °F (36.3 °C) 95 16 (!) 141/42 100 %   01/17/23 2000 -- 93 -- -- --   01/17/23 1800 -- (!) 109 -- -- --   01/17/23 1645 97.4 °F (36.3 °C) 79 16 (!) 133/35 100 %   01/17/23 1400 -- 82 -- -- --   01/17/23 1202 97.1 °F (36.2 °C) 82 16 (!) 122/32 100 %   01/17/23 1200 -- 85 -- -- --   01/17/23 1000 -- 89 -- -- --          Intake/Output Summary (Last 24 hours) at 1/18/2023 0804  Last data filed at 1/18/2023 4405  Gross per 24 hour   Intake 564.39 ml   Output 1200 ml   Net -635.61 ml          Physical Examination:     I had a face to face encounter with this patient and independently examined them on 1/18/2023 as outlined below:          Constitutional:  No acute distress, , pleasant, frail elderly male   ENT:  Oral mucosa dry  EOMI    Resp:  CTA bilaterally. No wheezing/rhonchi/rales. No accessory muscle use. CV:  Regular rhythm, normal rate, no murmurs, gallops, rubs    GI:  Soft, non distended, non tender. Bs+    Musculoskeletal:  No edema, warm, 2+ pulses throughout    Neurologic:  Moves all extremities.   Awake and alert, CN II-XII reviewed            Data Review:    Review and/or order of clinical lab test  Review and/or order of tests in the radiology section of CPT  Review and/or order of tests in the medicine section of CPT      Labs:     Recent Labs     01/18/23 0138 01/17/23 0418   WBC 4.8 5.8   HGB 8.3* 9.2*   HCT 29.3* 31.4*    238       Recent Labs     01/18/23 0131 01/17/23 0418 01/16/23 0054    136 134*   K 4.5 5.3* 5.0   * 109* 106   CO2 22 21 23   BUN 16 26* 25*   CREA 1.11 1.48* 1.32*   * 168* 192*   CA 9.3 9.7 9.0   MG 1.8 2.3 2.2   PHOS 2.7 3.0 3.2       Recent Labs     01/18/23 0131 01/17/23 0418 01/16/23 0054   ALT 44 50 47   * 190* 172*   TBILI 0.5 0.5 0.5   TP 6.0* 6.8 5.9*   ALB 2.9* 3.4* 2.9*   GLOB 3.1 3.4 3.0       No results for input(s): INR, PTP, APTT, INREXT, INREXT in the last 72 hours. No results for input(s): FE, TIBC, PSAT, FERR in the last 72 hours. Lab Results   Component Value Date/Time    Folate 10.5 07/03/2018 09:24 AM        No results for input(s): PH, PCO2, PO2 in the last 72 hours. No results for input(s): CPK, CKNDX, TROIQ in the last 72 hours.     No lab exists for component: CPKMB    Lab Results   Component Value Date/Time    Cholesterol, total 170 01/12/2023 05:00 AM    HDL Cholesterol 40 01/12/2023 05:00 AM    LDL, calculated 87 01/12/2023 05:00 AM    Triglyceride 215 (H) 01/12/2023 05:00 AM    CHOL/HDL Ratio 4.3 01/12/2023 05:00 AM     Lab Results   Component Value Date/Time    Glucose (POC) 106 01/18/2023 07:13 AM    Glucose (POC) 79 01/17/2023 09:47 PM    Glucose (POC) 166 (H) 01/17/2023 04:54 PM    Glucose (POC) 179 (H) 01/17/2023 11:26 AM    Glucose (POC) 171 (H) 01/17/2023 07:12 AM     Lab Results   Component Value Date/Time    Color YELLOW/STRAW 01/01/2023 09:13 AM    Appearance CLEAR 01/01/2023 09:13 AM    Specific gravity 1.013 01/01/2023 09:13 AM    Specific gravity 1.020 05/03/2014 08:18 AM    pH (UA) 5.5 01/01/2023 09:13 AM    Protein 30 (A) 01/01/2023 09:13 AM    Glucose Negative 01/01/2023 09:13 AM    Ketone Negative 01/01/2023 09:13 AM    Bilirubin Negative 01/01/2023 09:13 AM    Urobilinogen 1.0 01/01/2023 09:13 AM    Nitrites Negative 01/01/2023 09:13 AM    Leukocyte Esterase Negative 01/01/2023 09:13 AM    Epithelial cells FEW 01/01/2023 09:13 AM    Bacteria Negative 01/01/2023 09:13 AM    WBC 0-4 01/01/2023 09:13 AM    RBC 0-5 01/01/2023 09:13 AM         Medications Reviewed:     Current Facility-Administered Medications   Medication Dose Route Frequency    insulin lispro (HUMALOG) injection   SubCUTAneous AC&HS    dextrose 10 % infusion 0-250 mL  0-250 mL IntraVENous PRN    insulin glargine (LANTUS) injection 6 Units  6 Units SubCUTAneous QHS    digoxin (LANOXIN) tablet 0.125 mg  0.125 mg Oral EVERY OTHER DAY    milrinone (PRIMACOR) 20 MG/100 ML D5W infusion  0.2 mcg/kg/min IntraVENous CONTINUOUS    finasteride (PROSCAR) tablet 5 mg  5 mg Oral DAILY WITH DINNER    insulin lispro (HUMALOG) injection 6 Units  6 Units SubCUTAneous TID WITH MEALS    ivabradine (CORLANOR) tablet 2.5 mg  2.5 mg Oral BID WITH MEALS    dextrose 10 % infusion 0-250 mL  0-250 mL IntraVENous PRN    [Held by provider] spironolactone (ALDACTONE) tablet 12.5 mg  12.5 mg Oral DAILY    melatonin tablet 3 mg  3 mg Oral QHS PRN    [Held by provider] empagliflozin (JARDIANCE) tablet 10 mg  10 mg Oral DAILY    dextrose 10 % infusion 0-250 mL  0-250 mL IntraVENous PRN    albuterol-ipratropium (DUO-NEB) 2.5 MG-0.5 MG/3 ML  3 mL Nebulization Q6H PRN    mirtazapine (REMERON) tablet 7.5 mg  7.5 mg Oral QHS    pantoprazole (PROTONIX) tablet 40 mg  40 mg Oral ACB    [Held by provider] heparin (porcine) injection 5,000 Units  5,000 Units SubCUTAneous Q12H    QUEtiapine (SEROquel) tablet 50 mg  50 mg Oral QHS    lidocaine 4 % patch 1 Patch  1 Patch TransDERmal Q24H    balsam peru-castor oiL (VENELEX) ointment   Topical BID    alteplase (CATHFLO) 1 mg in sterile water (preservative free) 1 mL injection  1 mg InterCATHeter PRN    bacitracin 500 unit/gram packet 1 Packet  1 Packet Topical PRN    glucose chewable tablet 16 g  4 Tablet Oral PRN    glucagon (GLUCAGEN) injection 1 mg  1 mg IntraMUSCular PRN    senna-docusate (PERICOLACE) 8.6-50 mg per tablet 1 Tablet  1 Tablet Oral BID PRN    bisacodyL (DULCOLAX) suppository 10 mg  10 mg Rectal QHS PRN    sodium chloride (NS) flush 5-40 mL  5-40 mL IntraVENous Q8H    sodium chloride (NS) flush 5-40 mL  5-40 mL IntraVENous PRN    acetaminophen (TYLENOL) tablet 650 mg  650 mg Oral Q6H PRN    Or    acetaminophen (TYLENOL) suppository 650 mg  650 mg Rectal Q6H PRN    polyethylene glycol (MIRALAX) packet 17 g  17 g Oral DAILY PRN    ondansetron (ZOFRAN ODT) tablet 4 mg  4 mg Oral Q8H PRN    Or    ondansetron (ZOFRAN) injection 4 mg  4 mg IntraVENous Q6H PRN    aspirin delayed-release tablet 81 mg  81 mg Oral DAILY    ipratropium (ATROVENT) 21 mcg (0.03 %) nasal spray 2 Spray  2 Spray Both Nostrils Q12H    sodium chloride (NS) flush 5-40 mL  5-40 mL IntraVENous PRN     ______________________________________________________________________  EXPECTED LENGTH OF STAY: 5d 0h  ACTUAL LENGTH OF STAY:          27                 Vanesa Vivas MD

## 2023-01-18 NOTE — PROGRESS NOTES
[address] [facility] Adult  Hospitalist Group                                                                                          Hospitalist Progress Note  Zoe Minor MD  Answering service: [phone] OR [phone] from in house phone        Date of Service:  2023  NAME:  Roselia Cee  :  1950  MRN:  [id number]      Admission Summary:   Roselia Cee is a 71 y.o. male who presents with progressively worsening shortness of breath for past several days. It has gotten worse to the point that he can only ambulate a few steps due to severe dyspnea and near syncope. Patient also noted abdominal distention which is increasing. Patient with known history of cardiomyopathy and recently admitted for CHF exacerbation a week ago. On presentation to the ER, chest x-ray shows diffuse airspace disease atypical infection versus edema. Also patient was noted to have elevated troponin and BNP. Patient was further evaluated by CT abdomen and pelvis which shows massively distended bladder with some bilateral hydronephrosis, subsequently Vasquez was placed. Patient also with significant leukocytosis as well as tachycardic and elevated lactic acid level and subsequently code sepsis was called. Hospitalist service requested admit the patient for further evaluation management.      The patient denies any headache, blurry vision, sore throat, trouble swallowing, trouble with speech, chest pain, SOB, cough, fever, chills, N/V/D, abd pain, urinary symptoms, constipation, recent travels, sick contacts, focal or generalized neurological symptoms, falls, injuries, rashes, contact with COVID-19 diagnosed patients, hematemesis, melena, hemoptysis, hematuria, rashes, denies starting any new medications and denies any other concerns or problems besides as mentioned above       Interval history / Subjective:     Patient seen and examined , reviewed his vitals,labs,careplan  Plans for DC home with PICC and home IV inotropes  Hematuria resolved  urology rec noted  EGD/colonscopy on WED     Assessment & Plan:     Acute on chronic systolic congestive heart failure   Cardiogenic shock due to above - off dbutamine on milrinone  CAD   S/p CABG   Ischemic Cardiomyopathy    hypotension today- held duretics and 500ns bolus given w albumin  Appreciate help from Heart failure team.   S/p RHC - with normal to low filling pressures but mildly reduced CI. PICC line placed for home milrinone     TANNER on CKD stage III   Improving - stable cr  Monitor daily weight , I/O   holding diuretics today     GERD -chronic   No change in current Rx. Continue PPI     Depression-stable  Hospital delirium  -resolved  Continue Seroquel as is QHS - at 50mg   Continue Remeron as was PTA      T2DM -on Insulin   Controlled Romain diet   Insulin NPH Six units BID      BPH -with urinary retention  Continue proscar and donnelly on DC  Hematuria resolved urology recs noted with follow up appt  Stopped flomax due to persistent hypotension     PAD   S/p Right SFA PTCA -followed by Dr. Ramez Pearson   Normal MARIANELA     Plans for EGD and colonoscopy on WED as part of the LVAD workup   -plavix on hold for procedure     Code status: Full Code  Prophylaxis: heparin   Care Plan discussed with: pt,   Anticipated Disposition: greater than 48 hours,   Being worked up and considered for possible [id number] Problems  Date Reviewed: 12/5/2022            Codes Class Noted POA    Systolic CHF, acute on chronic ([facility] ICD-10-CM: I50.23  ICD-9-CM: 428.23, 428.0  12/29/2022 Yes        Receiving inotropic medication ICD-10-CM: Z79.899  ICD-9-CM: V49.89  12/29/2022 Unknown        Sepsis ([facility] ICD-10-CM: A41.9  ICD-9-CM: 038.9, 995.91  12/22/2022 Unknown       Review of Systems:   A comprehensive review of systems was negative except for that written in the HPI. Vital Signs:    Last 24hrs VS reviewed since prior progress note.  Most recent are:  Visit Vitals  BP (!) 141/42 (BP 1 Location: Left arm, BP Patient Position: At rest)   Pulse 100   Temp 97.3 °F (36.3 °C)   Resp 16   Ht 5' 9\" (1.753 m)   Wt 52.4 kg (115 lb 8.3 oz)   SpO2 100%   BMI 17.06 kg/m²     Patient Vitals for the past 24 hrs:   Temp Pulse Resp BP SpO2   01/17/23 2200 -- 100 -- -- --   01/17/23 2044 97.3 °F (36.3 °C) 95 16 (!) 141/42 100 %   01/17/23 2000 -- 93 -- -- --   01/17/23 1800 -- (!) 109 -- -- --   01/17/23 1645 97.4 °F (36.3 °C) 79 16 (!) 133/35 100 %   01/17/23 1400 -- 82 -- -- --   01/17/23 1202 97.1 °F (36.2 °C) 82 16 (!) 122/32 100 %   01/17/23 1200 -- 85 -- -- --   01/17/23 1000 -- 89 -- -- --   01/17/23 0720 97.9 °F (36.6 °C) 98 16 (!) 104/37 98 %   01/17/23 0558 -- 93 -- -- --   01/17/23 0357 -- 100 -- -- --   01/17/23 0158 -- (!) 102 -- -- --   01/17/23 0028 98.1 °F (36.7 °C) 77 18 (!) 136/49 99 %          Intake/Output Summary (Last 24 hours) at 1/17/2023 2231  Last data filed at 1/17/2023 1800  Gross per 24 hour   Intake 650 ml   Output 1800 ml   Net -1150 ml          Physical Examination:     I had a face to face encounter with this patient and independently examined them on 1/17/2023 as outlined below:          Constitutional:  No acute distress, , pleasant, frail elderly male   ENT:  Oral mucosa dry  EOMI    Resp:  CTA bilaterally. No wheezing/rhonchi/rales. No accessory muscle use. CV:  Regular rhythm, normal rate, no murmurs, gallops, rubs    GI:  Soft, non distended, non tender. Bs+    Musculoskeletal:  No edema, warm, 2+ pulses throughout    Neurologic:  Moves all extremities.   Awake and alert, CN II-XII reviewed            Data Review:    Review and/or order of clinical lab test  Review and/or order of tests in the radiology section of CPT  Review and/or order of tests in the medicine section of CPT      Labs:     Recent Labs     01/17/23 0418 01/15/23  0204   WBC 5.8 5.8   HGB 9.2* 8.7*   HCT 31.4* 30.3*    258       Recent Labs     01/17/23  0418 01/16/23  0054 01/15/23  0204    134* 135*   K 5.3* 5.0 4.8   * 106 106   CO2 21 23 24   BUN 26* 25* 28*   CREA 1.48* 1.32* 1.36*   * 192* 224*   CA 9.7 9.0 9.1   MG 2.3 2.2  --    PHOS 3.0 3.2  --        Recent Labs     01/17/23  0418 01/16/23  0054 01/15/23  0204   ALT 50 47 47   * 172* 174*   TBILI 0.5 0.5 0.3   TP 6.8 5.9* 6.3*   ALB 3.4* 2.9* 2.8*   GLOB 3.4 3.0 3.5       No results for input(s): INR, PTP, APTT, INREXT, INREXT in the last 72 hours. No results for input(s): FE, TIBC, PSAT, FERR in the last 72 hours. Lab Results   Component Value Date/Time    Folate 10.5 07/03/2018 09:24 AM        No results for input(s): PH, PCO2, PO2 in the last 72 hours. No results for input(s): CPK, CKNDX, TROIQ in the last 72 hours.     No lab exists for component: CPKMB    Lab Results   Component Value Date/Time    Cholesterol, total 170 01/12/2023 05:00 AM    HDL Cholesterol 40 01/12/2023 05:00 AM    LDL, calculated 87 01/12/2023 05:00 AM    Triglyceride 215 (H) 01/12/2023 05:00 AM    CHOL/HDL Ratio 4.3 01/12/2023 05:00 AM     Lab Results   Component Value Date/Time    Glucose (POC) 79 01/17/2023 09:47 PM    Glucose (POC) 166 (H) 01/17/2023 04:54 PM    Glucose (POC) 179 (H) 01/17/2023 11:26 AM    Glucose (POC) 171 (H) 01/17/2023 07:12 AM    Glucose (POC) 150 (H) 01/16/2023 08:28 PM     Lab Results   Component Value Date/Time    Color YELLOW/STRAW 01/01/2023 09:13 AM    Appearance CLEAR 01/01/2023 09:13 AM    Specific gravity 1.013 01/01/2023 09:13 AM    Specific gravity 1.020 05/03/2014 08:18 AM    pH (UA) 5.5 01/01/2023 09:13 AM    Protein 30 (A) 01/01/2023 09:13 AM    Glucose Negative 01/01/2023 09:13 AM    Ketone Negative 01/01/2023 09:13 AM    Bilirubin Negative 01/01/2023 09:13 AM    Urobilinogen 1.0 01/01/2023 09:13 AM    Nitrites Negative 01/01/2023 09:13 AM    Leukocyte Esterase Negative 01/01/2023 09:13 AM    Epithelial cells FEW 01/01/2023 09:13 AM    Bacteria Negative 01/01/2023 09:13 AM    WBC 0-4 01/01/2023 09:13 AM RBC 0-5 01/01/2023 09:13 AM         Medications Reviewed:     Current Facility-Administered Medications   Medication Dose Route Frequency    midodrine (PROAMATINE) tablet 5 mg  5 mg Oral TID WITH MEALS    insulin lispro (HUMALOG) injection   SubCUTAneous AC&HS    dextrose 10 % infusion 0-250 mL  0-250 mL IntraVENous PRN    insulin glargine (LANTUS) injection 6 Units  6 Units SubCUTAneous QHS    digoxin (LANOXIN) tablet 0.125 mg  0.125 mg Oral EVERY OTHER DAY    milrinone (PRIMACOR) 20 MG/100 ML D5W infusion  0.2 mcg/kg/min IntraVENous CONTINUOUS    finasteride (PROSCAR) tablet 5 mg  5 mg Oral DAILY WITH DINNER    insulin lispro (HUMALOG) injection 6 Units  6 Units SubCUTAneous TID WITH MEALS    ivabradine (CORLANOR) tablet 2.5 mg  2.5 mg Oral BID WITH MEALS    dextrose 10 % infusion 0-250 mL  0-250 mL IntraVENous PRN    [Held by provider] spironolactone (ALDACTONE) tablet 12.5 mg  12.5 mg Oral DAILY    melatonin tablet 3 mg  3 mg Oral QHS PRN    [Held by provider] empagliflozin (JARDIANCE) tablet 10 mg  10 mg Oral DAILY    dextrose 10 % infusion 0-250 mL  0-250 mL IntraVENous PRN    albuterol-ipratropium (DUO-NEB) 2.5 MG-0.5 MG/3 ML  3 mL Nebulization Q6H PRN    mirtazapine (REMERON) tablet 7.5 mg  7.5 mg Oral QHS    pantoprazole (PROTONIX) tablet 40 mg  40 mg Oral ACB    heparin (porcine) injection 5,000 Units  5,000 Units SubCUTAneous Q12H    QUEtiapine (SEROquel) tablet 50 mg  50 mg Oral QHS    lidocaine 4 % patch 1 Patch  1 Patch TransDERmal Q24H    balsam peru-castor oiL (VENELEX) ointment   Topical BID    alteplase (CATHFLO) 1 mg in sterile water (preservative free) 1 mL injection  1 mg InterCATHeter PRN    bacitracin 500 unit/gram packet 1 Packet  1 Packet Topical PRN    glucose chewable tablet 16 g  4 Tablet Oral PRN    glucagon (GLUCAGEN) injection 1 mg  1 mg IntraMUSCular PRN    senna-docusate (PERICOLACE) 8.6-50 mg per tablet 1 Tablet  1 Tablet Oral BID PRN    bisacodyL (DULCOLAX) suppository 10 mg  10 mg Rectal QHS PRN    sodium chloride (NS) flush 5-40 mL  5-40 mL IntraVENous Q8H    sodium chloride (NS) flush 5-40 mL  5-40 mL IntraVENous PRN    acetaminophen (TYLENOL) tablet 650 mg  650 mg Oral Q6H PRN    Or    acetaminophen (TYLENOL) suppository 650 mg  650 mg Rectal Q6H PRN    polyethylene glycol (MIRALAX) packet 17 g  17 g Oral DAILY PRN    ondansetron (ZOFRAN ODT) tablet 4 mg  4 mg Oral Q8H PRN    Or    ondansetron (ZOFRAN) injection 4 mg  4 mg IntraVENous Q6H PRN    aspirin delayed-release tablet 81 mg  81 mg Oral DAILY    ipratropium (ATROVENT) 21 mcg (0.03 %) nasal spray 2 Spray  2 Spray Both Nostrils Q12H    sodium chloride (NS) flush 5-40 mL  5-40 mL IntraVENous PRN     ______________________________________________________________________  EXPECTED LENGTH OF STAY: 5d 0h  ACTUAL LENGTH OF STAY:          26                 Marc Jaime MD

## 2023-01-18 NOTE — PROCEDURES
[address], [address] [place] [address]                 Colonoscopy Procedure Note    Indications:   See Preoperative Diagnosis above  Referring Physician: Irina Alcantara MD  Anesthesia/Sedation: MAC anesthesia Propofol  Endoscopist:  Dr. Kaela Burton  Assistant:  Endoscopy Technician-1: Layne Proctor IV  Endoscopy RN-1: Dylon Gonzalez RN  Preoperative diagnosis: LVAD Work up  Postoperative diagnosis: Gastric erythema  Gastric polyp    Procedure in Detail:  Informed consent was obtained for the procedure, including sedation. Risks of perforation, hemorrhage, adverse drug reaction, and aspiration were discussed. The patient was placed in the left lateral decubitus position. Based on the pre-procedure assessment, including review of the patient's medical history, medications, allergies, and review of systems, he had been deemed to be an appropriate candidate for  sedation; he was therefore sedated with the medications listed above. The patient was monitored continuously with ECG tracing, pulse oximetry, blood pressure monitoring, and direct observations. A rectal examination was performed. The [id number] was inserted into the rectum and advanced under direct vision to the cecum, which was identified by the ileocecal valve and appendiceal orifice. The quality of the colonic preparation was good. A careful inspection was made as the colonoscope was withdrawn, including a retroflexed view of the rectum; findings and interventions are described below. Appropriate photodocumentation was obtained. Findings:  Rectum: normal  Sigmoid: normal  Descending Colon: normal  Transverse Colon: normal  Ascending Colon: normal  Cecum: normal    Specimens:    none    EBL: None    Complications: None; patient tolerated the procedure well.     Recommendations:      - Continue supportive care, cardiac diet      Signed By: Kaela Burton MD                        January 18, 2023

## 2023-01-18 NOTE — PROGRESS NOTES
2330 Bedside shift change report given to Kaitlynn Braden (oncoming nurse) by Elaine Giron (offgoing nurse). Report included the following information SBAR, Kardex, Intake/Output, MAR, Recent Results, and Cardiac Rhythm NSR . Notified in report that pt BM appears red and possibly bloody now. BM assessed and appears red/orange like food dye. Pt states he was eating red/orange jello earlier. Kelly Patel, NP notified. Orders received for CBC. 0730 Bedside shift change report given to Chelle (oncoming nurse) by Kaitlynn Braden (offgoing nurse). Report included the following information SBAR, Kardex, Intake/Output, MAR, Recent Results, and Cardiac Rhythm NSR . Problem: Patient Education: Go to Patient Education Activity  Goal: Patient/Family Education  Outcome: Progressing Towards Goal     Problem: Falls - Risk of  Goal: *Absence of Falls  Description: Document Mike Russell Fall Risk and appropriate interventions in the flowsheet.   Outcome: Progressing Towards Goal  Note: Fall Risk Interventions:  Mobility Interventions: Patient to call before getting OOB, PT Consult for assist device competence         Medication Interventions: Assess postural VS orthostatic hypotension, Bed/chair exit alarm, Evaluate medications/consider consulting pharmacy, Patient to call before getting OOB, Teach patient to arise slowly    Elimination Interventions: Call light in reach, Patient to call for help with toileting needs    History of Falls Interventions: Door open when patient unattended, Evaluate medications/consider consulting pharmacy         Problem: Patient Education: Go to Patient Education Activity  Goal: Patient/Family Education  Outcome: Progressing Towards Goal     Problem: Patient Education: Go to Patient Education Activity  Goal: Patient/Family Education  Outcome: Progressing Towards Goal     Problem: Heart Failure: Discharge Outcomes  Goal: *Demonstrates ability to perform prescribed activity without shortness of breath or discomfort  Outcome: Progressing Towards Goal  Goal: *Verbalizes understanding and describes prescribed diet  Outcome: Progressing Towards Goal  Goal: *Verbalizes understanding/describes prescribed medications  Outcome: Progressing Towards Goal  Goal: *Describes available resources and support systems  Description: (eg: Home Health, Palliative Care, Advanced Medical Directive)  Outcome: Progressing Towards Goal     Problem: Patient Education: Go to Patient Education Activity  Goal: Patient/Family Education  Outcome: Progressing Towards Goal     Problem: Pressure Injury - Risk of  Goal: *Prevention of pressure injury  Description: Document Mike Scale and appropriate interventions in the flowsheet.   Outcome: Progressing Towards Goal  Note: Pressure Injury Interventions:  Sensory Interventions: Assess need for specialty bed, Avoid rigorous massage over bony prominences, Check visual cues for pain, Discuss PT/OT consult with provider, Float heels, Keep linens dry and wrinkle-free, Maintain/enhance activity level, Minimize linen layers, Pressure redistribution bed/mattress (bed type)    Moisture Interventions: Assess need for specialty bed, Minimize layers    Activity Interventions: Assess need for specialty bed, Increase time out of bed, Pressure redistribution bed/mattress(bed type)    Mobility Interventions: Assess need for specialty bed, Pressure redistribution bed/mattress (bed type)    Nutrition Interventions: Document food/fluid/supplement intake    Friction and Shear Interventions: Minimize layers                Problem: Patient Education: Go to Patient Education Activity  Goal: Patient/Family Education  Outcome: Progressing Towards Goal

## 2023-01-18 NOTE — PROGRESS NOTES
Transitions of Care Plan  RUR: 24% - high  Clinical Update: milrinone; LVAD workup; GI scopes today  Consults: [facility]; GI  Baseline: independent without DME; resides w wife  Barriers to Discharge: medical  Disposition:   [address]  Estimated Discharge Date: 1/19/23    CM reviewed chart for clinical update and spoke with [facility] RN. Patient to have GI scopes today - timing is TBD. CM spoke with [facility] re Helon Sal. Confirmed patient has insurance approval for Helon Sal. CM advised [facility] of anticipated discharge tomorrow. [facility] to fit patient first thing in the AM - around 0830. CM spoke with [facility] intake and provided update that patient will discharge tomorrow.     Disposition:  [address] [facility] [place] [facility]  DME - rollator and [address] Erwin Starks, MPH  Care Manager [facility]  Available via [facility] or

## 2023-01-18 NOTE — PROCEDURES
[facility]  [address]                 Oralia Singer   :   1950   MRN:   [id number]     Date/Time:  2023 1:08 PM    Esophagogastroduodenoscopy (EGD) Procedure Note    :  Jose Jamison MD    Staff: Endoscopy Technician-1: Raquel Hogan IV  Endoscopy RN-1: Melanie Santos RN     Referring Provider:  Peña Smith MD    Anethesia/Sedation:  MAC anesthesia Propofol    Preoperative diagnosis: LVAD Work up    Postoperative diagnosis: Gastric erythema  Gastric polyp    Procedure Details     After infom consent was obtained for the procedure, with all risks and benefits of procedure explained the patient was taken to the endoscopy suite and placed in the left lateral decubitus position. Following sequential administration of sedation as per above, the [id number] gastroscope was inserted into the mouth and advanced under direct vision to second portion of the duodenum. A careful inspection was made as the gastroscope was withdrawn, including a retroflexed view of the proximal stomach; findings and interventions are described below. Findings:  Esophagus:normal  Stomach:Patchy erythema gastric body, biopsies done. 6 mm sessile polyp gastric body biopsied  Duodenum/jejunum:normal      Therapies:  none    Specimens: gastric body bx, gastric polyp bx           EBL: None    Complications:   None; patient tolerated the procedure well. Impression:    See Postoperative diagnosis above    Recommendations:  -Await pathology. , -Cardiac diet        Jose Jamison MD

## 2023-01-18 NOTE — ROUTINE PROCESS
Nikki Bob  1950  [id number]    Situation:  Verbal report received from: Denys Mcardle RN  Procedure: Procedure(s):  ESOPHAGOGASTRODUODENOSCOPY (EGD)  COLONOSCOPY  ESOPHAGOGASTRODUODENAL (EGD) BIOPSY  ENDOSCOPIC POLYPECTOMY    Background:    Preoperative diagnosis: LVAD Work up  Postoperative diagnosis: Gastric erythema  Gastric polyp    :  Dr. Sandra Wiley  Assistant(s): Endoscopy Technician-1: Cody Green  Endoscopy RN-1: Janetta Lanes, RN    Specimens:   ID Type Source Tests Collected by Time Destination   1 : Gastric body bx Preservative   Latoya Latham MD 1/18/2023 1211 Pathology   2 : Gastric polyp Preservative   Latoya Latham MD 1/18/2023 1212 Pathology     H. Pylori  no    Assessment:  Anesthesia gave intra-procedure sedation and medications, see anesthesia flow sheet yes    Intravenous fluids: NS@ KVO     Vital signs stable     Abdominal assessment: round and soft     Recommendation:  Return to floor.

## 2023-01-18 NOTE — PROGRESS NOTES
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, LVEF 25-30%, stage D, NYHA class IIIB/IV; initiated on home milrinone gtt as bridge to completion of LVAD workup  Most likely etiology of cardiomyopathy: CAD +/- TRISTAN +/- inappropriate sinus tach +/- undergoing workup   Tentative discharge home after scopes end-of-week Thu/Fri      RECOMMENDATIONS:  Continue milrinone  0.2mcg/kg/min unable to uptitrate due to HR and BP  Orders for home milrinone placed in chart  Discontinue midodrine  Cannot tolerate beta-blockers due to hypotension  Cannot tolerate ARB/ARNi due to hypotension  Of note Flomax was held and BP responded well   Hold spironolactone, limited by hyperkalemia  Cannot tolerate jardiance 10mg daily due to significant diuresis on smallest dose/contributed to IVVD  Continue corlanor 2.5mg twice daily, goal HR 70-80s  Decrease digoxin 0.125mcg to every other day, check digoxin level daily (today 0.9 goal 0.7-1.2)  Continue to hold diuretics  Give albumin x once today  Scopes per GI  Lifevest ordered  Continue baby aspirin   Ok to stop Plavix per Dr. Marguerite Willard on GI Scopes on   Inpatient sleep medicine consult pending (high risk for TRISTAN)  Appreciate urology recs, will need OP follow up   Changed code status to full  Physical therapy; home PT at discharge  Plan on DC with home inotropic support as bridge to LVAD  VAD evaluate in progress, will need to do dental, urology, sleep medicine as OP  Moderate cardiac risk for scopes due to inotrope use, otherwise compensated heart failure and euvolemic with normal hemodynamics  Ambulate daily All other care per primary team, hopefully home end of the week     IMPRESSION:  Acute on chronic combined systolic/diastolic  heart failure  Stage D, NYHA class IIIB/IV symptoms   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, LVEF 25-30% and 23% (by cMRI 1/3/23)  Cardiac MRI suggestive of ischemic cardiomyopathy  PYP equivocal  Initiation of chronic milrinone infusion as palliation (6MW   Coronary artery disease  CAD s/p CABG x 2: further disease best managed medically due to small vessel size   At risk of sudden cardiac death  Peripheral arterial disease  Bilateral hydronephrosis s/p donnelly  Cardiac risk factors:  HTN  HL  TRISTAN, STOP-BANG 4  DM2  CKD, stage 3  MOCA from 1/4/22 17/30, consistent with mild cognitive impairment  Full code      INTERVAL EVENTS:  Ambulating well, 3 laps  Improved appetite   Afebrile  -130s, HR 90s  I/O net neg 1.18 liter  Weight 116lbs   Hgb 8.3 from 9.2  Cr 1.11 from 1.48 from 32  K 4.5 from 5.3  Mg 1.8  Alb 2.9  T Bili 0.5  NT 2837 from 3173 from 2400  Procalcitonin 0.15 from 0.21 from 0.23  Prealbumin 15.8      LIFE GOALS:  Lifestyle goals reviewed with the patient. Patient's personal goals include: TBD  Important upcoming milestones or family events: TBD  The patient identifies the following as important for living well: TBD     Patient assessed. High suspicion of sleep apnea due to the following reasons. Will refer for sleep evaluation. [x] Heart Failure  [] Hypertention  [x] Atrial Fibrillation  [] BMI > 30  [] History of stroke  [] Diabetes  [x] Heavy snoring  [] Witnessed apnea  [] Hypoxemia                    HPI:  71 y.o. male who was admitted via ED for worsening SOB, poor appetite, orthopnea and fatigue. Pmhx of CAD s/p CABG, PAD, DM 2, recent admission for HFmrEF earlier this month. Serial TTEs show progressive decline in EF since 3/2021 with the most recent EF at 25-30%. Recent LHC shows diffuse CAD and no interventions indicated.  Patient had COVID recently and there is some thought to myocarditis or other etiology causing worsening HF. [facility] consulted for further evaluation and management of HFrEF. CARDIAC IMAGING:  Echo 1/9/23   Left Ventricle: Severely reduced left ventricular systolic function with a visually estimated EF of 25 - 30%. Left ventricle size is normal. Mildly increased wall thickness. Echo 12/26/22    Left Ventricle: Severely reduced left ventricular systolic function with a visually estimated EF of 25 - 30%. Left ventricle size is normal. Normal wall thickness. There are regional wall motion abnormalities. Grade II diastolic dysfunction with increased LAP. Right Ventricle: Moderately reduced systolic function. TAPSE is abnormal. TAPSE is 1.1 cm. Aortic Valve: Mild stenosis of the aortic valve. AV peak gradient is 13 mmHg. AV peak velocity is 1.8 m/s. Mitral Valve: Not well visualized. Moderate annular calcification at the posterior leaflet of the mitral valve. Mild to moderate regurgitation. Tricuspid Valve: Mildly elevated RVSP. Left Atrium: Left atrium is moderately dilated. 12/8/22    Left Ventricle: Moderately reduced left ventricular systolic function with a visually estimated EF of 35 - 40%. Severe hypokinesis of the following segments: mid anteroseptal, apical anterior, apical septal, apical inferior and apical lateral. Severe hypokinesis of the apex. Mitral Valve: Severely thickened leaflet, at the anterior and posterior leaflets. Severely calcified leaflet, at the anterior and posterior leaflets. Mild annular calcification of the mitral valve. Moderate regurgitation. Left Atrium: Left atrium is mildly dilated. Contrast used: Definity. limited study     EKG 12/22/22 ST, Biatria enlargement, marked ST abnormality     C 12/6/22  1. Normal LVEDP  2. Severe native multivessel coronary artery disease  3. Patent LIMA to LAD and vein graft to distal RCA  4.   Recurrent ISR in OM1 stent with now 60 to 70% restenosis  5. Recoil of left main and circumflex stent with now recurrent 40 to 50% stenosis. 6.  Progression of ostial left main disease now to about 60% stenosis  7. Progression of disease in jailed first marginal branch now with diffuse 90% stenosis  8. High-grade stenosis in the mid to distal right potential femoral artery treated with 6 x 40 mm impact drug-coated balloon angioplasty to reduce the stenosis to less than 40%     NST        HEMODYNAMICS:  RHC 1/9/23  PA 20/9, RA 3, PCWP 8, CI 1.8     CPEST too ill   6MW 300 feet       PHYSICAL EXAM:  Visit Vitals  BP (!) 134/47   Pulse 91   Temp 97.5 °F (36.4 °C)   Resp 16   Ht 5' 9\" (1.753 m)   Wt 116 lb 6.5 oz (52.8 kg)   SpO2 98%   BMI 17.19 kg/m²     General: Patient is frail, cachectic in no acute distress  HEENT: Unremarkable   Neck: Supple. No evidence of thyroid enlargements or lymphadenopathy. JVD: Cannot be appreciated   Lungs: Breath sounds are equal and clear bilaterally. No wheezes, rhonchi, or rales. Heart: Regular rate and rhythm with normal S1 and S2. No murmurs, gallops or rubs. Abdomen: Soft, no mass or tenderness. No organomegaly or hernia. Bowel sounds present. Genitourinary and rectal: deferred  Extremities: No cyanosis, clubbing, or edema. Neurologic: No focal sensory or motor deficits are noted. Grossly intact. Psychiatric: Awake, alert an doriented x 3. Appropriate mood and affect. Skin: Warm, dry and well perfused. REVIEW OF SYSTEMS:  General: Denies fever. Ear, nose and throat: Denies difficulty hearing, sinus problems, nosebleeds  Cardiovascular: see above in the interval history  Respiratory: Denies cough, wheezing, sputum production, hemoptysis.   Gastrointestinal: Denies heartburn, constipation, diarrhea, abdominal pain, nausea, blood in stool  Kidney and bladder: Denies painful urination, frequent urination  Musculoskeletal: Denies joint pain, muscle weakness  Skin and hair: Denies change in existing skin lesions    PAST MEDICAL HISTORY:  Past Medical History:   Diagnosis Date    CAD (coronary artery disease) 11/10/2016    NSTEMI & 2 stents    Deafness 10/28/2012    DM (diabetes mellitus) ([facility]     Elevated cholesterol     Hypertension     NSTEMI (non-ST elevated myocardial infarction) ([facility] 11/10/2016       PAST SURGICAL HISTORY:  Past Surgical History:   Procedure Laterality Date    COLONOSCOPY N/A 6/28/2018    COLONOSCOPY performed by Merrill Kilgore MD at [facility] ENDOSCOPY    M[address] UNLIST  11/11/2016    2 stents       FAMILY HISTORY:  Family History   Problem Relation Age of Onset    Heart Disease Father     Heart Attack Father     Hypertension Mother     Elevated Lipids Brother     Elevated Lipids Brother     No Known Problems Sister     Elevated Lipids Brother     No Known Problems Son     No Known Problems Daughter     Anesth Problems Neg Hx        SOCIAL HISTORY:  Social History     Socioeconomic History    Marital status:    Tobacco Use    Smoking status: Never     Passive exposure: Never    Smokeless tobacco: Never   Vaping Use    Vaping Use: Never used   Substance and Sexual Activity    Alcohol use: Yes     Alcohol/week: 2.0 standard drinks     Types: 1 Cans of beer, 1 Shots of liquor per week     Comment: rarely    Drug use: No    Sexual activity: Yes     Social Determinants of Health     Financial Resource Strain: Medium Risk    Difficulty of Paying Living Expenses: Somewhat hard   Food Insecurity: Food Insecurity Present    Worried About Running Out of Food in the Last Year: Never true    Ran Out of Food in the Last Year: Often true       LABORATORY RESULTS:     Labs Latest Ref Rng & Units 1/18/2023 1/17/2023 1/16/2023 1/15/2023 1/14/2023 1/13/2023 1/12/2023   WBC 4.1 - 11.1 K/uL 4.8 5.8 - 5.8 - 5.7 -   RBC 4.10 - 5.70 M/uL 3.47(L) 3.80(L) - 3.66(L) - 3.55(L) -   Hemoglobin 12.1 - 17.0 g/dL 8. 3(L) 9.2(L) - 8. 7(L) - 8. 5(L) -   Hematocrit 36.6 - 50.3 % 29.3(L) 31. 4(L) - 30. 3(L) - 28. 5(L) -   MCV 80.0 - 99.0 FL 84.4 82.6 - 82.8 - 80.3 -   Platelets 809 - 867 K/uL 227 238 - 258 - 264 -   Lymphocytes 12 - 49 % 38 - - - - - -   Monocytes 5 - 13 % 9 - - - - - -   Eosinophils 0 - 7 % 4 - - - - - -   Basophils 0 - 1 % 1 - - - - - -   Albumin 3.5 - 5.0 g/dL 2. 9(L) 3.4(L) 2. 9(L) 2. 8(L) 2. 9(L) 3.0(L) 3. 3(L)   Calcium 8.5 - 10.1 MG/DL 9.3 9.7 9.0 9.1 9.2 9.4 9.7   Glucose 65 - 100 mg/dL 103(H) 168(H) 192(H) 224(H) 162(H) 106(H) 118(H)   BUN 6 - 20 MG/DL 16 26(H) 25(H) 28(H) 29(H) 29(H) 27(H)   Creatinine 0.70 - 1.30 MG/DL 1.11 1.48(H) 1.32(H) 1.36(H) 1.29 1.28 1.29   Sodium 136 - 145 mmol/L 140 136 134(L) 135(L) 136 135(L) 135(L)   Potassium 3.5 - 5.1 mmol/L 4.5 5.3(H) 5.0 4.8 4.5 4.6 4.5   TSH 0.36 - 3.74 uIU/mL - - - - - - -   PSA 0.01 - 4.0 ng/mL - 2.2 - - - - -   LDH 85 - 241 U/L - - - - - - 189   Some recent data might be hidden     Lab Results   Component Value Date/Time    TSH 2.12 12/27/2022 02:36 PM    TSH 4.80 (H) 12/06/2022 03:53 AM    TSH 5.39 (H) 10/12/2022 09:10 AM    TSH 3.53 02/03/2022 11:47 AM    TSH 5.790 (H) 11/21/2019 04:45 PM    TSH 3.08 06/22/2018 01:53 PM    TSH 4.250 05/26/2015 09:43 AM       ALLERGY:  No Known Allergies     CURRENT MEDICATIONS:    Current Facility-Administered Medications:     insulin lispro (HUMALOG) injection, , SubCUTAneous, AC&HS, Cathy Sheldon CNS, 2 Units at 01/17/23 1656    dextrose 10 % infusion 0-250 mL, 0-250 mL, IntraVENous, PRN, Cathy Sheldon CNS    insulin glargine (LANTUS) injection 6 Units, 6 Units, SubCUTAneous, QHS, Cathy Sheldon CNS    digoxin (LANOXIN) tablet 0.125 mg, 0.125 mg, Oral, EVERY OTHER DAY, Mike Kirk MD, 0.125 mg at 01/18/23 0901    milrinone (PRIMACOR) 20 MG/100 ML D5W infusion, 0.2 mcg/kg/min, IntraVENous, CONTINUOUS, Mike Kirk MD, Last Rate: 3.1 mL/hr at 01/18/23 0800, 0.2 mcg/kg/min at 01/18/23 0800    finasteride (PROSCAR) tablet 5 mg, 5 mg, Oral, DAILY WITH DINNER, Stuart Hillman, Matilda Orourke MD, 5 mg at 01/17/23 1645    insulin lispro (HUMALOG) injection 6 Units, 6 Units, SubCUTAneous, TID WITH MEALS, Cathy Sheldon CNS, 6 Units at 01/17/23 1655    ivabradine (CORLANOR) tablet 2.5 mg, 2.5 mg, Oral, BID WITH MEALS, Shaila, Lizette B, NP, 2.5 mg at 01/18/23 0855    dextrose 10 % infusion 0-250 mL, 0-250 mL, IntraVENous, PRN, Cathy Sheldon CNS    [Held by provider] spironolactone (ALDACTONE) tablet 12.5 mg, 12.5 mg, Oral, DAILY, Shaila, Lizette B, NP, 12.5 mg at 01/17/23 0917    melatonin tablet 3 mg, 3 mg, Oral, QHS PRN, Modesta Gomez NP, 3 mg at 01/15/23 2116    [Held by provider] empagliflozin (JARDIANCE) tablet 10 mg, 10 mg, Oral, DAILY, Reuben Bethea MD, 10 mg at 01/14/23 0929    dextrose 10 % infusion 0-250 mL, 0-250 mL, IntraVENous, PRN, Cathy Sheldon, SLICK    albuterol-ipratropium (DUO-NEB) 2.5 MG-0.5 MG/3 ML, 3 mL, Nebulization, Q6H PRN, Aspen Mosley MD    mirtazapine (REMERON) tablet 7.5 mg, 7.5 mg, Oral, QHS, Mirakov Allan HARTLEY MD, 7.5 mg at 01/17/23 2212    pantoprazole (PROTONIX) tablet 40 mg, 40 mg, Oral, ACB, Velma Garth, DO, 40 mg at 01/17/23 0716    [Held by provider] heparin (porcine) injection 5,000 Units, 5,000 Units, SubCUTAneous, Q12H, Velma Garth, DO, 5,000 Units at 01/17/23 2212    QUEtiapine (SEROquel) tablet 50 mg, 50 mg, Oral, QHS, David Aguilar G, DO, 50 mg at 01/17/23 2212    lidocaine 4 % patch 1 Patch, 1 Patch, TransDERmal, Q24H, Keyana MOLINA MD, 1 Patch at 01/17/23 1645    balsam peru-castor oiL (VENELEX) ointment, , Topical, BID, Arsalan Zamora MD, Given at 01/18/23 0855    alteplase (CATHFLO) 1 mg in sterile water (preservative free) 1 mL injection, 1 mg, InterCATHeter, PRN, Jaylene Bryant MD    bacitracin 500 unit/gram packet 1 Packet, 1 Packet, Topical, PRN, Jaylene Bryant MD    glucose chewable tablet 16 g, 4 Tablet, Oral, PRN, Keyana MOLINA MD    glucagon (GLUCAGEN) injection 1 mg, 1 mg, IntraMUSCular, PRN, Severa Grime, MD    senna-docusate (PERICOLACE) 8.6-50 mg per tablet 1 Tablet, 1 Tablet, Oral, BID PRN, Severa Grime, MD, 1 Tablet at 12/26/22 2394    bisacodyL (DULCOLAX) suppository 10 mg, 10 mg, Rectal, QHS PRN, Piyush MOLINA MD    sodium chloride (NS) flush 5-40 mL, 5-40 mL, IntraVENous, Q8H, Walker Aponte MD, 10 mL at 01/18/23 0540    sodium chloride (NS) flush 5-40 mL, 5-40 mL, IntraVENous, PRN, Severa Grime, MD, 10 mL at 12/28/22 0845    acetaminophen (TYLENOL) tablet 650 mg, 650 mg, Oral, Q6H PRN, 650 mg at 01/15/23 2116 **OR** acetaminophen (TYLENOL) suppository 650 mg, 650 mg, Rectal, Q6H PRN, Walker Shah MD    polyethylene glycol (MIRALAX) packet 17 g, 17 g, Oral, DAILY PRN, Severa Grime, MD, 17 g at 12/26/22 0649    ondansetron (ZOFRAN ODT) tablet 4 mg, 4 mg, Oral, Q8H PRN **OR** ondansetron (ZOFRAN) injection 4 mg, 4 mg, IntraVENous, Q6H PRN, Piyush MOLINA MD, 4 mg at 01/17/23 1302    aspirin delayed-release tablet 81 mg, 81 mg, Oral, DAILY, Bette Rodriguez MD, 81 mg at 01/18/23 0854    ipratropium (ATROVENT) 21 mcg (0.03 %) nasal spray 2 Spray, 2 Spray, Both Nostrils, Q12H, Bette Rodriguez MD, 2 [place] at 01/18/23 0855    sodium chloride (NS) flush 5-40 mL, 5-40 mL, IntraVENous, PRN, Severa Grime, MD    PATIENT CARE TEAM:  Patient Care Team:  Berny Cedeno MD as PCP - General (Family Medicine)  Berny Cedeno MD as PCP - [facility] Provider  Jan Mckeon MD (Cardiovascular Disease Physician)  Porsha Lebron MD (Gastroenterology)  Jewel Woody MD (Cardiothoracic Surgery)  Nathen Thomas MD (Cardiovascular Disease Physician)  Josi Berry MD (Nephrology)  Jesse Kohler MD (Pulmonary Disease)     Thank you for allowing me to participate in this patient's care.     Tiesha Quintanilla MD   [address] [place] [address], [address]  Phone: [phone]

## 2023-01-18 NOTE — PROGRESS NOTES
Occupational Therapy:    Chart reviewed and attempted to see for OT session, however patient off the floor at this time to endoscopy . Will continue to follow up and attempt as able.      Krista Fuentes, OT

## 2023-01-18 NOTE — PROGRESS NOTES
Comprehensive Nutrition Assessment    Type and Reason for Visit: Reassess    Nutrition Recommendations/Plan:   Continue with Low Fat/Low Chol/KAYLENE diet  Continue with Magic Cup and Ensure Enlive daily       Malnutrition Assessment:  Malnutrition Status:  Severe malnutrition (12/29/22 1147)    Context:  Acute illness     Findings of the 6 clinical characteristics of malnutrition:   Energy Intake:  Unable to assess  Weight Loss:  Greater than 5% over 1 month     Body Fat Loss: Moderate body fat loss, Buccal region, Orbital   Muscle Mass Loss: Moderate muscle mass loss, Clavicles (pectoralis & deltoids), Thigh (quadriceps), Calf  Fluid Accumulation:  No significant fluid accumulation,     Strength:  Not performed        Nutrition Assessment:    Pt admitted with Sepsis. PMHx: CAD, DM 2, HTN, NSTEMI. Cardiogenic shock resolved-off pressor. Cardiomyopathy systolic heart failure; ?myocarditis. Steroids ordered; biopsy deferred for now. EF ~20%. TANNER on CKD 3. Severe fatigue. 1/18: Follow up. S/P EGD and colonoscopy today as part of LVAD workup - both unremarkable. Spoke with pt and family (daughter?) at bedside after procedures. Pt states he had some chips since coming back to his room but did not eat today or yesterday in preparation for today. Daughter states prior to that he was eating well. Intakes documented as % meals. Pt states he is still consuming the [employer] and Ensure. Weight is remaining stable - up 1 kg from a week ago. Labs: BG over last 4 days: [id number], pre-albumin 15.8      1/11: Follow up. Family is considering LVAD workup. Spoke with pt at bedside. Wife not present today who usually gives better detailed reports of exactly what and how much pt has eaten. When I ask pt how he is eating he always says, \"okay\". Breakfast tray in room, pt consumed 100%! He ate sausage, toast, fruit, milk, Glucerna.   Intakes recently have been reported as % meals which is a huge improvement. Multiple ONS piled up on pt's bedside table, he states he is sometimes drinking them now, usually 1x/day. Will decrease the orders. He is now preferring vanilla flavor. Will continue [employer] as he is eating some of that as well. Weight is remaining stable which is good but would like to start seeing some weight gain. Labs:       1/5:  Follow up. Spoke with pt and wife at bedside. Pt states he did not eat much of dinner last night that wife brought it because the nurse did not heat it up correctly. For breakfast this morning he ate 1 piece of toast and turkey sausage, drank OJ and Ensure. Received a perfectserve message from Diabetes Nurse Specialist about possibly changing his ONS to provide few CHO. Spoke with pt about trying Glucerna and he was agreeable (vanilla flavor). Will continue strawberry Ensure Enlive 1x/day as pt enjoys this. Wife states she had trouble ordering meals for him because he wanted a grilled cheese sandwich but was told he cannot have it with his current Na+ restriction. Discussed my plan to liberalize his diet with wife and suggested she call again today to order that for him for dinner. He already placed lunch order: chicken noodle soup, fish, mashed potatoes, green beans. Pt has been eating very little of our food so I'm hoping if he can order things he enjoys it might help with his intakes. Weight has been stable this admission. Pt with severe muscle and fat wasting. I continue to encourage increasing his PO intakes. Labs: LFT's elevated, POC BG last 24h: [id number]        1/4:  Spoke with pt and wife at bedside. RD previously discussed recommendation for DHT to provide nocturnal TF with pt's daughter. This information was passed on to pt's wife so when I brought this up she was aware of this conversation.   I explained how this would work, the benefit of it, etc.  She asked appropriate questions and ultimately decided that she wants to hold off on having DHT placed at this time and try to encourage increased PO intakes. Pt had just consumed about 25% of a small hamburger that wife had made and then vomited. No previous episodes of emesis this admission. Pt states he is drinking the Ensure [place] when it comes and also drinking milk. Now complaining that this might be too much milk at one time and his stomach is not feeling the best with so much. Encouraged pt to separate when he drinks the Ensures and milks. Provided pt with a menu and the diet office # for him and his wife to call and order meals for him. He was asking me for soup. Wife also continues to bring in meals from home and some spices. PO intakes have remained poor. 1/2: MD consult received for CHF diet education. Briefly discussed this with pt. No family present at time of visit but pt states his son will be coming in later today and will look over the information. Handout with RD contact info left in pt's room. Did discuss his intakes as they have been poor. He states he is eating very little still. Is drinking Ensure and milk and eating some of what his family brings in. Has also tried [place] [employer] and likes that okay, states he is eating some of it. Did not offer up any other food preferences. Encouraged increased PO intakes. 12/29: Per wife, pt weighed 135# when admitted on 12/5. Current wt reflects 22# weight loss-suspect mostly fluid however wife reports pt without edema when weighed 135#. Pt appears malnourished; meets criteria for acute malnutrition (see above). Poor PO intake noted by wife since first hospital admission but worse in the past week. Family bringing in food from home; Mr Ebonie Rose did accept strawberry Ensure well yesterday. Pt complains of early satiety-will take a few bites and then states he doesn't want anymore. Encouraged wife to have patient eat throughout the day vs just at mealtime; consume Ensure supplements between meals. Mr Jodi Sullivan is accepting bananas and milk well per wife; wife also bringing in extra spices for hospital food as well as [facility] [place] [facility] food that pt normally consumes. Will modify Glucerna shake to Ensure Plus HP since pt eating little and prefers strawberry flavor. Ensure also provides more kcal/protein per serving (350 kcal/20 gm protein vs 220 kcal/10 gm protein). Per documentation below-pt is at max consuming ~35% of meals. Will continue to monitor intake and weight. May benefit from short term EN support. Potassium replaced today. Insulin drip ordered 2/2 addition of steroid. BG close to 500 yesterday evening-better today. BUN, CR, Phosphorus and Magnesium all elevated d/t TANNER. Vit D discontinued today 2/2 hypercalcemia-nephrology following.       Nutritionally Significant Medications:  Humalog, Remeron, Protonix, Milrinone      Estimated Daily Nutrient Needs:  Energy Requirements Based On: Kcal/kg  Weight Used for Energy Requirements: Current (51.7 kg)  Energy (kcal/day): 8439-5519 (30-35 kcal/kg)  Weight Used for Protein Requirements: Current  Protein (g/day): 77 (1.5 g/kg)  Method Used for Fluid Requirements: 1 ml/kcal  Fluid (ml/day):      Nutrition Related Findings:   Edema: none  Last BM: 01/18/23, Watery, Loose    Wounds: Unstageable      Current Nutrition Therapies:  Diet: Low Fat/Low Chol/KAYLENE  Supplements: Ensure Enlive, Magic Cup  Meal intake: Patient Vitals for the past 168 hrs:   % Diet Eaten   01/18/23 0454 0%   01/18/23 0030 0%   01/17/23 1241 76 - 100%   01/17/23 0800 51 - 75%   01/16/23 1600 51 - 75%   01/16/23 1200 76 - 100%   01/16/23 0835 0%   01/15/23 1600 76 - 100%   01/15/23 1200 26 - 50%   01/15/23 0840 0%   01/14/23 1600 0%   01/14/23 1200 51 - 75%   01/14/23 0930 26 - 50%   01/13/23 1600 0%   01/13/23 1200 26 - 50%   01/13/23 1005 26 - 50%   01/12/23 1645 (P) 76 - 100%   01/12/23 1200 26 - 50%   01/12/23 0745 76 - 100%   01/12/23 0433 0%   01/11/23 2309 0%   01/11/23 1939 0%   01/11/23 1730 76 - 100%     Supplement intake: Patient Vitals for the past 168 hrs:   Supplement intake %   01/18/23 0454 0%   01/18/23 0030 0%   01/16/23 1600 0%   01/16/23 1200 0%   01/16/23 0835 0%   01/15/23 1600 0%   01/15/23 1200 0%   01/15/23 0840 0%   01/14/23 1600 0%   01/14/23 1200 0%   01/14/23 0930 0%   01/13/23 1600 0%   01/13/23 1200 0%   01/13/23 1005 0%   01/12/23 1200 (P) 76 - 100%   01/12/23 0745 (P) 76 - 100%   01/12/23 0433 0%   01/11/23 2309 0%   01/11/23 1939 0%     Nutrition Support: none      Anthropometric Measures:  Height: 5' 9\" (175.3 cm)  Ideal Body Weight (IBW): 160 lbs (73 kg)     Current Body Wt:  52.8 kg (116 lb 6.5 oz), 72.8 % IBW.  Standing scale  Current BMI (kg/m2): 17.2                          BMI Category: Underweight (BMI less than 22) age over 72    Wt Readings from Last 10 Encounters:   01/18/23 52.8 kg (116 lb 6.5 oz)   12/19/22 58.5 kg (129 lb)   12/16/22 57.4 kg (126 lb 8.7 oz)   12/05/22 59 kg (130 lb)   12/05/22 59.1 kg (130 lb 3.2 oz)   11/16/22 61.2 kg (135 lb)   10/27/22 60.8 kg (134 lb)   10/27/22 60.8 kg (134 lb)   10/12/22 60.8 kg (134 lb)   09/13/22 60.3 kg (133 lb)           Nutrition Diagnosis:   Increased nutrient needs related to increased demand for energy/nutrients as evidenced by wounds  Underweight related to inadequate protein-energy intake as evidenced by BMI (16.8)    Nutrition Interventions:   Food and/or Nutrient Delivery: Continue current diet, Modify oral nutrition supplement  Nutrition Education/Counseling: No recommendations at this time  Coordination of Nutrition Care: Continue to monitor while inpatient, Interdisciplinary rounds       Goals:  Previous Goal Met: Progressing toward goal(s)  Goals: PO intake 75% or greater, within 7 days  Specify Other Goals: PO intakes > 75% meals + ONS to promote weight gain of 0.5-1 lb over next 5-7 days    Nutrition Monitoring and Evaluation:   Behavioral-Environmental Outcomes: None identified  Food/Nutrient Intake Outcomes: Supplement intake, Food and nutrient intake  Physical Signs/Symptoms Outcomes: Biochemical data, Fluid status or edema, Weight, Hemodynamic status, GI status    Discharge Planning:    Continue current diet, Continue oral nutrition supplement    Jacinto Davenport RD  Available via [employer]

## 2023-01-18 NOTE — CONSULTS
Palliative Medicine Brief note-      Thank you for consulting the palliative medicine team. We appreciate the opportunity to have been of service to Mr. Kendall and his very supportive family. At this time, Mr. Sylvia Landry is making progress and his goals are clear, he is pursuing  LVAD and has begun workup. Palliative will sign off now, notified  [facility] Finders NP w/ Advanced Heart Failure Team and is in agreement. Please re consult palliative should team see a need for us to see Mr. Kendall again. Thank you.

## 2023-01-18 NOTE — DIABETES MGMT
[facility]  DIABETES MANAGEMENT CONSULT    Consulted by  Nikunj Guo MD   for advanced nursing evaluation and care for inpatient blood glucose management. Evaluation and Action Plan   Bayron Carvajal is a 71year old gentleman, with Type 2 Diabetes with a recent A1C of 6.5%, who was admitted with acute on chronic HFrEF and cardiogenic shock. He was admitted 2 weeks prior for similar complaint and antihyperglycemic agents were adjusted on discharge. Metformin was stopped due to decline in GFR and Jardiance switched to [place] (unclear why). He was compliant with his Ca [place], [place] and Glipizide up until day prior to this hospitalization. His glucose was significantly elevated at 458 on admission, s/t insulin resistance from acute HFrEF, elevated lactate and impaired perfusion. Low dose basal insulin was started and sufficient to control BG. There was a suspicion for myocarditis and hydrocortisone 50mg twice daily was started. Basal insulin was escalated to 48 units but with an exaggerated post-prandial glucose spike to over 400 daily- related to steroids in the post-prandial state and high carb containing nutritional shakes. Steroids weaned to off and basal now at baseline- 8 units daily. 10mg Jardiance daily was started for heart failure management. Despite Jardiance and Glargine, he continued to have pre-prandial hyperglycemia this weekend and bolus insulin resumed. He was not able to tolerate Jardiance per advanced HF and will not continue. He will therefore need an advancement in basal dosing when GI scopes complete tomorrow. Inpatient BG goal is 140-180mg/dl. Management Rationale Action Plan   Medication   Basal needs Diabetes: 0.15 units/kg as fasting over   goal without Jardiance   6 units Lantus HS- will continue this dose as will be NPO for scopes tomorrow.   Then will increase to 8 units HS after procedures complete   Nutritional needs Using moderate sensitivity based on   oral intake and degree of pre-prandial   hyperglycemia off steroids Continue 6 units Humalog/meal    Hold if patient is NPO or consumes less than 50% of carbohydrates on meal tray   Corrective insulin Using normal sensitivity Changed to Normal Sensitivity ACHS   Additional Recommendations. POC glucose ACHS    Consistent carbohydrate diet (60 grams CHO/meal). Appreciate RD help with adjustments. Jardiance per F     Discharge Planning:   Continue Januvia and Glipizide at PTA doses. Resume Metformin (as GFR stable): would start with 500mg BID    SGLT2 will stop at hospital d/c per HF    FUV with PCP for ongoing routine diabetes care. Would recommend that he is seen by endocrinologist given need for specialty diabetes care along with heart disease. He is scheduled with Gonzalo Domínguez MD 4/4/23. Prescription for glucometer kit (Meter, Lancets (100), Strips (100)). Patient to obtain a blood glucose reading four times daily. First thing in the morning prior to eating and drinking anything then before lunch, dinner and bedtime. Create a log and present to PCP for interpretation. Will drop Rx off with outpatient pharmacy so that I can train him on the exact machine that he will go home with. Family interested in a CGM, which would enable close glucose monitoring. He is at risk for significant glucose fluctuations with his heart disease and would allow for titration of diabetes medications. Initial Presentation   Leandra Carver is a 71 y.o. male who presented to the ED 12/22/22 with a 3-4 day c/o gradually worsening dyspnea, fatigue, chills, constipation and RLQ pain. He endorses difficulty voiding despite compliance with lasix. In the ED, he was hypoxic and started on supplemental O2 and diuresis.    LAB: WBC 25.1, , GFR 25, Lac 5.2, Trop 1096, BNP 6905  CXR: Widespread interstitial opacities bilateral mid to lower lung opacities  concerning for edema and/or atypical infection. Small bilateral pleural  effusions. CT: CT revealed distended bladder  EKG: Sinus tachycardia   Biatrial enlargement   Rightward axis   Marked ST abnormality, possible inferior subendocardial injury   Marked ST abnormality, possible lateral subendocardial injury   Abnormal ECG     Did have a hospital admission for HFrEF 12/11/22-12/16/22    HX:   Past Medical History:   Diagnosis Date    CAD (coronary artery disease) 11/10/2016    NSTEMI & 2 stents    Deafness 10/28/2012    DM (diabetes mellitus) ([facility] [address]     Elevated cholesterol     Hypertension     NSTEMI (non-ST elevated myocardial infarction) ([facility] [address] 11/10/2016    CKD  CAD with CABG 2018    INITIAL DX:   [facility] [A41.9]     Current Treatment     TX: IVF, Diuresis, Supplemental O2    Hospital Course   Clinical progress has been complicated by:   39/00: Initial admission with hospitalist service but with progressive hypoxia early requiring CCU transfer. Elevated troponin/BNP. BPH and hydronephrosis. Pt pan cultured, given dose of abx and dose of IVP lasix. Pt placed on BiPAP w/ improvement in oxygenation. 12/23: Seen by urology for bilateral hydronephrosis related to distended bladder. Maintain urinary catheter. 12/24: CVL placement   12/26: Decreased urine output, started on bumex and dobutamine gtt. Cardiology consult. Continue inotrope. 12/27: Advanced Heart Failure Consult. Started HF evaluation. Nephrology consult: Decreased bumex gtt  12/28: Mini pulse steroids per cardiology for treatment of myocarditis. Insulin gtt. 12/30: Transferred to hospitalist service. 1/3: Cardiac MRI: Ischemic CM  1/6: Steroids complete. Anell Randy started  1/9: Right heart cath: Normal to low filling pressures but mildly reduced CI. CI of 2 l/min/m2 off dobutamine  PICC Placed and Milrinone started. Urology consult for hematuria. Held plavix x1 day. GI consult for LVAD work-up.    1/18: EGD with normal findings  Diabetes History   Type 2 Diabetes  Ambulatory BG management provided by: PCP Lucretia Buckner MD    Diabetes-related Medical History  Acute complications  Acute hyperglycemia  Neurological complications  Peripheral neuropathy  Microvascular disease  Nephropathy  Macrovascular disease  CAD  Other associated conditions     CHF    Diabetes Medication History  Key Antihyperglycemic Medications               dapagliflozin (FARXIGA) 10 mg tab tablet (Taking) Take 1 Tablet by mouth daily. glipiZIDE (GLUCOTROL) 5 mg tablet (Taking) Take 1 tablet by mouth twice daily    Januvia 50 mg tablet (Taking) Take 1 tablet by mouth once daily             Diabetes self-management practices:   Eating pattern   Eats 3 small meals daily  [x] Breakfast  2 Eggs, Coffee  [x] Lunch   [place]  [x] Dinner   \" Food\" Bread, rice, lentils   [x] Bedtime  [place]  [x] Snacks   Afternoon snack  [x] Beverages  Water, Coffee  Physical activity pattern   Sedentary   Monitoring pattern  Does not check BG  Taking medications pattern  [x] Consistent administration  [x] Affordable  Social determinants of health impacting diabetes self-management practices   Concerned that you need to know more about how to stay healthy with diabetes  Overall evaluation:    [x] Achieving A1c target with drug therapy & self-care practices    Subjective   Hello.      Objective   Physical exam  General Underweight male in mild distress/ill-appearing. Conversant and cooperative  Neuro  Alert, oriented   Vital Signs Visit Vitals  BP (!) 125/51   Pulse 84   Temp 99.8 °F (37.7 °C)   Resp 18   Ht 5' 9\" (1.753 m)   Wt 52.8 kg (116 lb 6.5 oz)   SpO2 97%   BMI 17.19 kg/m²     Skin  Warm and dry. Acanthosis noted along neckline. No lipohypertrophy or lipoatrophy noted at injection sites   Heart   Regular rate and rhythm.  No murmurs, rubs or gallops  Lungs  Clear to auscultation without rales or rhonchi  Extremities No foot wounds        Laboratory  Recent Labs     01/18/23  0138 01/18/23  0131 23  0418 23  0054   GLU  --  103* 168* 192*   AGAP  --  7 6 5   WBC 4.8  --  5.8  --    CREA  --  1.11 1.48* 1.32*   AST  --  28 34 33   ALT  --  44 50 47         Factors impacting BG management  Factor Dose Comments   Nutrition:  Standard meals     60 grams/meal  Ensure Enlive 1x day  Glucerna BID    Combined non-ischemic and ischemic cardiomyopathy with HF EF 20-30%  Dobutamine off  Milrinone started  BNP 3173    Suspicion of myocarditis Started on IV steroid trial: Complete          Other:   Kidney function TANNER- resolved  GFR >60  Indwelling catheter to remain    Acute Liver Injury Elevated liver enzymes  S/t hypotension. Blood glucose pattern    Significant diabetes-related events over the past 24-72 hours  A1C  6.5% 22 (down from 7.0 10/12/22)  Fasting B  Pre-prandial:   Basal: Lantus 6 units HS- held overnight  Bolus: 6 units/meal  Correction: 4 units in the last 24h  Jardiance 10mg daily:D/C 1/15    Ultimate plan is to D/C home with inotrope support as bridge to LVAD.  Tentative d/c at the end of this week (as early as tomorrow) after GI scopes    Assessment and Nursing Intervention   Nursing Diagnosis Risk for unstable blood glucose pattern   Nursing Intervention Domain 5250 Decision-making Support   Nursing Interventions Examined current inpatient diabetes/blood glucose control   Explored factors facilitating and impeding inpatient management  Explored corrective strategies with patient and responsible inpatient provider   Informed patient of rational for insulin strategy while hospitalized     Nursing Diagnosis 27112 Ineffective Health Management   Nursing Intervention Domain 5250 Decision-making Support   Nursing Interventions Identified diabetes self-management practices impeding diabetes control  Discussed diabetes survival skills related to  Healthy Plate eating plan; given handouts  Role of physical activity in improving insulin sensitivity and action  Procedure for blood glucose monitoring & options for low-cost products  Medications plan at discharge     Billing Code(s)   No charge    Before making these care recommendations, I personally reviewed the hospitalization record, including notes, laboratory & diagnostic data and current medications, and examined the patient at the bedside (circumstances permitting) before making care recommendations. More than fifty (50) percent of the time was spent in patient counseling and/or care coordination.   Total minutes: 13    Merle Bell CNS  Diabetes Clinical Nurse Specialist  [facility]  Access via [employer]

## 2023-01-18 NOTE — PROGRESS NOTES
0730: Bedside and Verbal shift change report given to LORI Corbett (oncoming nurse) by Flower Melton RN (offgoing nurse). Report included the following information SBAR, Kardex, MAR, and Cardiac Rhythm NSR / ST . Rate verified milrinone gtt. 1047: TRANSFER - OUT REPORT:    Verbal report given to Sukumar Coleman RN (name) on Pollo Muro  being transferred to Endoscopy (unit) for routine progression of care       Report consisted of patients Situation, Background, Assessment and   Recommendations(SBAR). Information from the following report(s) SBAR, Kardex, MAR, and Cardiac Rhythm NSR / Jackqulyn Leisure  was reviewed with the receiving nurse. Lines:   PICC Double Lumen 50/11/01 Right;Basilic (Active)   Central Line Being Utilized Yes 01/18/23 0800   Criteria for Appropriate Use Irritant/vesicant medication 01/18/23 0800   Site Assessment Clean, dry, & intact 01/18/23 0800   Phlebitis Assessment 0 01/18/23 0800   Infiltration Assessment 0 01/18/23 0800   Arm Circumference (cm) 22 cm 01/12/23 1612   Date of Last Dressing Change 01/12/23 01/18/23 0030   Dressing Status Clean, dry, & intact 01/18/23 0800   Action Taken Open ports on tubing capped 01/18/23 0800   External Catheter Length (cm) 0 centimeters 01/12/23 1612   Dressing Type Bacteriocidal;Transparent 01/18/23 0454   Hub Color/Line Status Red;Flushed; Infusing 01/18/23 0800   Positive Blood Return (Site #1) Yes 01/18/23 0800   Hub Color/Line Status Purple;Flushed;Capped 01/18/23 0800   Positive Blood Return (Site #2) Yes 01/18/23 0800   Alcohol Cap Used Yes 01/18/23 0800        Opportunity for questions and clarification was provided. Patient transported with:   Monitor  Registered Nurse      [phone] [id number]: Pt arrived back to unit with nurse and monitor. Pt connected to monitor and VS obtained. Pt sitting in chair, resting comfortably. 1930: Bedside and Verbal shift change report given to Flower Melton RN (oncoming nurse) by Danielle Bedoya RN (offgoing nurse).  Report included the following information SBAR, Kardex, MAR, and Alarm Parameters NSR / ST. Care Plan:   Problem: Diabetes Self-Management  Goal: *Disease process and treatment process  Description: Define diabetes and identify own type of diabetes; list 3 options for treating diabetes. Outcome: Progressing Towards Goal  Goal: *Incorporating nutritional management into lifestyle  Description: Describe effect of type, amount and timing of food on blood glucose; list 3 methods for planning meals. Outcome: Progressing Towards Goal  Goal: *Developing strategies to promote health/change behavior  Description: Define the ABC's of diabetes; identify appropriate screenings, schedule and personal plan for screenings. Outcome: Progressing Towards Goal  Goal: *Using medications safely  Description: State effect of diabetes medications on diabetes; name diabetes medication taking, action and side effects. Outcome: Progressing Towards Goal       Problem: Falls - Risk of  Goal: *Absence of Falls  Description: Document Conception Brawn Fall Risk and appropriate interventions in the flowsheet.   Outcome: Progressing Towards Goal  Note: Fall Risk Interventions:  Mobility Interventions: Patient to call before getting OOB, PT Consult for assist device competence    Medication Interventions: Assess postural VS orthostatic hypotension, Bed/chair exit alarm, Evaluate medications/consider consulting pharmacy, Patient to call before getting OOB, Teach patient to arise slowly    Elimination Interventions: Call light in reach, Patient to call for help with toileting needs    History of Falls Interventions: Door open when patient unattended, Evaluate medications/consider consulting pharmacy      Problem: Patient Education: Go to Patient Education Activity  Goal: Patient/Family Education  Outcome: Progressing Towards Goal       Problem: Patient Education: Go to Patient Education Activity  Goal: Patient/Family Education  Outcome: Progressing Towards Goal       Problem: Pressure Injury - Risk of  Goal: *Prevention of pressure injury  Description: Document Mike Scale and appropriate interventions in the flowsheet.   Outcome: Progressing Towards Goal  Note: Pressure Injury Interventions:  Sensory Interventions: Float heels, Keep linens dry and wrinkle-free, Maintain/enhance activity level, Minimize linen layers    Moisture Interventions: Assess need for specialty bed, Minimize layers    Activity Interventions: Pressure redistribution bed/mattress(bed type), PT/OT evaluation, Increase time out of bed    Mobility Interventions: Pressure redistribution bed/mattress (bed type), PT/OT evaluation, Float heels, HOB 30 degrees or less    Nutrition Interventions: Document food/fluid/supplement intake    Friction and Shear Interventions: Lift sheet, Lift team/patient mobility team, Minimize layers      Problem: Patient Education: Go to Patient Education Activity  Goal: Patient/Family Education  Outcome: Progressing Towards Goal       Problem: Infection - Risk of, Urinary Catheter-Associated Urinary Tract Infection  Goal: *Absence of infection signs and symptoms  Outcome: Progressing Towards Goal       Problem: Patient Education: Go to Patient Education Activity  Goal: Patient/Family Education  Outcome: Progressing Towards Goal       Problem: Nutrition Deficit  Goal: *Optimize nutritional status  Outcome: Progressing Towards Goal

## 2023-01-18 NOTE — PROGRESS NOTES
Initial RN admission and assessment performed and documented in Endoscopy navigator. Patient evaluated by anesthesia in pre-procedure holding. All procedural vital signs, airway assessment, and level of consciousness information monitored and recorded by anesthesia staff on the anesthesia record. Report received from CRNA post procedure. Patient transported to recovery area by RN. Endoscope was pre-cleaned at bedside immediately following procedure by Vernell Solomon.

## 2023-01-18 NOTE — ANESTHESIA PREPROCEDURE EVALUATION
Anesthetic History   No history of anesthetic complications            Review of Systems / Medical History  Patient summary reviewed, nursing notes reviewed and pertinent labs reviewed    Pulmonary  Within defined limits                 Neuro/Psych              Cardiovascular    Hypertension          Past MI, CAD, cardiac stents, CABG and hyperlipidemia    Exercise tolerance: <4 METS  Comments: EF 25%   GI/Hepatic/Renal         Renal disease: CRI       Endo/Other    Diabetes: type 2         Other Findings              Physical Exam    Airway  Mallampati: II  TM Distance: 4 - 6 cm  Neck ROM: normal range of motion   Mouth opening: Normal     Cardiovascular  Regular rate and rhythm,  S1 and S2 normal,  no murmur, click, rub, or gallop             Dental    Dentition: Poor dentition and Lower partial plate     Pulmonary  Breath sounds clear to auscultation               Abdominal  GI exam deferred       Other Findings            Anesthetic Plan    ASA: 4  Anesthesia type: MAC          Induction: Intravenous  Anesthetic plan and risks discussed with: Patient

## 2023-01-18 NOTE — ANESTHESIA POSTPROCEDURE EVALUATION
Procedure(s):  ESOPHAGOGASTRODUODENOSCOPY (EGD)  COLONOSCOPY  ESOPHAGOGASTRODUODENAL (EGD) BIOPSY  ENDOSCOPIC POLYPECTOMY. MAC    Anesthesia Post Evaluation        Patient participation: complete - patient participated  Level of consciousness: awake  Pain management: adequate  Airway patency: patent  Anesthetic complications: no  Cardiovascular status: hemodynamically stable  Respiratory status: acceptable  Hydration status: acceptable  Comments: The patient is ready for PACU discharge. Nora Mireles DO                   Post anesthesia nausea and vomiting:  controlled      INITIAL Post-op Vital signs:   Vitals Value Taken Time   /49 01/18/23 1300   Temp 37.7 °C (99.8 °F) 01/18/23 1242   Pulse 82 01/18/23 1310   Resp 18 01/18/23 1310   SpO2 95 % 01/18/23 1305   Vitals shown include unvalidated device data.

## 2023-01-18 NOTE — PROGRESS NOTES
Physical Therapy:   1/18/2023    Chart reviewed in preparation for PT treatment. Noted patient EVAN for colonoscopy. Will defer and continue to follow as able.      Thank you,  Priscilla Marin, PT, DPT

## 2023-01-19 ENCOUNTER — DOCUMENTATION ONLY (OUTPATIENT)
Dept: CARDIOLOGY CLINIC | Age: 73
End: 2023-01-19

## 2023-01-19 VITALS
BODY MASS INDEX: 17.31 KG/M2 | RESPIRATION RATE: 20 BRPM | SYSTOLIC BLOOD PRESSURE: 110 MMHG | DIASTOLIC BLOOD PRESSURE: 45 MMHG | OXYGEN SATURATION: 90 % | WEIGHT: 116.84 LBS | HEIGHT: 69 IN | HEART RATE: 94 BPM | TEMPERATURE: 98 F

## 2023-01-19 LAB
ALBUMIN SERPL-MCNC: 2.9 G/DL (ref 3.5–5)
ALBUMIN/GLOB SERPL: 0.9 (ref 1.1–2.2)
ALP SERPL-CCNC: 154 U/L (ref 45–117)
ALT SERPL-CCNC: 39 U/L (ref 12–78)
ANION GAP SERPL CALC-SCNC: 6 MMOL/L (ref 5–15)
AST SERPL-CCNC: 25 U/L (ref 15–37)
BILIRUB SERPL-MCNC: 0.4 MG/DL (ref 0.2–1)
BNP SERPL-MCNC: 1807 PG/ML
BUN SERPL-MCNC: 14 MG/DL (ref 6–20)
BUN/CREAT SERPL: 14 (ref 12–20)
CALCIUM SERPL-MCNC: 9.3 MG/DL (ref 8.5–10.1)
CHLORIDE SERPL-SCNC: 107 MMOL/L (ref 97–108)
CO2 SERPL-SCNC: 23 MMOL/L (ref 21–32)
CREAT SERPL-MCNC: 1.03 MG/DL (ref 0.7–1.3)
DIGOXIN SERPL-MCNC: 0.7 NG/ML (ref 0.9–2)
ERYTHROCYTE [DISTWIDTH] IN BLOOD BY AUTOMATED COUNT: 23.9 % (ref 11.5–14.5)
GLOBULIN SER CALC-MCNC: 3.2 G/DL (ref 2–4)
GLUCOSE BLD STRIP.AUTO-MCNC: 207 MG/DL (ref 65–117)
GLUCOSE BLD STRIP.AUTO-MCNC: 311 MG/DL (ref 65–117)
GLUCOSE BLD STRIP.AUTO-MCNC: 329 MG/DL (ref 65–117)
GLUCOSE SERPL-MCNC: 266 MG/DL (ref 65–100)
HCT VFR BLD AUTO: 27.4 % (ref 36.6–50.3)
HGB BLD-MCNC: 8.1 G/DL (ref 12.1–17)
MAGNESIUM SERPL-MCNC: 1.7 MG/DL (ref 1.6–2.4)
MCH RBC QN AUTO: 24.7 PG (ref 26–34)
MCHC RBC AUTO-ENTMCNC: 29.6 G/DL (ref 30–36.5)
MCV RBC AUTO: 83.5 FL (ref 80–99)
NRBC # BLD: 0 K/UL (ref 0–0.01)
NRBC BLD-RTO: 0 PER 100 WBC
PHOSPHATE SERPL-MCNC: 2.8 MG/DL (ref 2.6–4.7)
PHOSPHATIDYLETHANOL(PETH): NEGATIVE NG/ML
PLATELET # BLD AUTO: 205 K/UL (ref 150–400)
PMV BLD AUTO: 10.3 FL (ref 8.9–12.9)
POTASSIUM SERPL-SCNC: 5 MMOL/L (ref 3.5–5.1)
PROCALCITONIN SERPL-MCNC: 0.11 NG/ML
PROT SERPL-MCNC: 6.1 G/DL (ref 6.4–8.2)
RBC # BLD AUTO: 3.28 M/UL (ref 4.1–5.7)
SERVICE CMNT-IMP: ABNORMAL
SODIUM SERPL-SCNC: 136 MMOL/L (ref 136–145)
WBC # BLD AUTO: 5.4 K/UL (ref 4.1–11.1)

## 2023-01-19 PROCEDURE — 82962 GLUCOSE BLOOD TEST: CPT

## 2023-01-19 PROCEDURE — 74011000250 HC RX REV CODE- 250: Performed by: STUDENT IN AN ORGANIZED HEALTH CARE EDUCATION/TRAINING PROGRAM

## 2023-01-19 PROCEDURE — 74011636637 HC RX REV CODE- 636/637: Performed by: CLINICAL NURSE SPECIALIST

## 2023-01-19 PROCEDURE — 83880 ASSAY OF NATRIURETIC PEPTIDE: CPT

## 2023-01-19 PROCEDURE — 80053 COMPREHEN METABOLIC PANEL: CPT

## 2023-01-19 PROCEDURE — 74011250636 HC RX REV CODE- 250/636: Performed by: INTERNAL MEDICINE

## 2023-01-19 PROCEDURE — 85027 COMPLETE CBC AUTOMATED: CPT

## 2023-01-19 PROCEDURE — 97116 GAIT TRAINING THERAPY: CPT

## 2023-01-19 PROCEDURE — 99231 SBSQ HOSP IP/OBS SF/LOW 25: CPT | Performed by: CLINICAL NURSE SPECIALIST

## 2023-01-19 PROCEDURE — 74011000250 HC RX REV CODE- 250: Performed by: SPECIALIST

## 2023-01-19 PROCEDURE — 74011250637 HC RX REV CODE- 250/637: Performed by: FAMILY MEDICINE

## 2023-01-19 PROCEDURE — 74011250637 HC RX REV CODE- 250/637: Performed by: NURSE PRACTITIONER

## 2023-01-19 PROCEDURE — 36415 COLL VENOUS BLD VENIPUNCTURE: CPT

## 2023-01-19 PROCEDURE — 83735 ASSAY OF MAGNESIUM: CPT

## 2023-01-19 PROCEDURE — 84100 ASSAY OF PHOSPHORUS: CPT

## 2023-01-19 PROCEDURE — 74011250637 HC RX REV CODE- 250/637: Performed by: STUDENT IN AN ORGANIZED HEALTH CARE EDUCATION/TRAINING PROGRAM

## 2023-01-19 PROCEDURE — 84145 PROCALCITONIN (PCT): CPT

## 2023-01-19 PROCEDURE — 80162 ASSAY OF DIGOXIN TOTAL: CPT

## 2023-01-19 RX ORDER — PANTOPRAZOLE SODIUM 40 MG/1
40 TABLET, DELAYED RELEASE ORAL
Qty: 30 TABLET | Refills: 0 | Status: SHIPPED | OUTPATIENT
Start: 2023-01-20 | End: 2023-02-19

## 2023-01-19 RX ORDER — METFORMIN HYDROCHLORIDE 500 MG/1
500 TABLET ORAL 2 TIMES DAILY WITH MEALS
Qty: 60 TABLET | Refills: 0 | Status: SHIPPED | OUTPATIENT
Start: 2023-01-19 | End: 2023-02-18

## 2023-01-19 RX ORDER — DIGOXIN 125 MCG
0.12 TABLET ORAL EVERY OTHER DAY
Qty: 15 TABLET | Refills: 0 | Status: SHIPPED | OUTPATIENT
Start: 2023-01-20 | End: 2023-02-19

## 2023-01-19 RX ORDER — FINASTERIDE 5 MG/1
5 TABLET, FILM COATED ORAL
Qty: 30 TABLET | Refills: 0 | Status: SHIPPED | OUTPATIENT
Start: 2023-01-19 | End: 2023-02-18

## 2023-01-19 RX ADMIN — Medication 7 UNITS: at 13:11

## 2023-01-19 RX ADMIN — SODIUM CHLORIDE, PRESERVATIVE FREE 10 ML: 5 INJECTION INTRAVENOUS at 05:47

## 2023-01-19 RX ADMIN — Medication 6 UNITS: at 13:11

## 2023-01-19 RX ADMIN — SODIUM CHLORIDE, PRESERVATIVE FREE 10 ML: 5 INJECTION INTRAVENOUS at 13:12

## 2023-01-19 RX ADMIN — IVABRADINE 2.5 MG: 5 TABLET, FILM COATED ORAL at 10:14

## 2023-01-19 RX ADMIN — Medication: at 10:03

## 2023-01-19 RX ADMIN — FINASTERIDE 5 MG: 5 TABLET, FILM COATED ORAL at 18:10

## 2023-01-19 RX ADMIN — Medication 3 UNITS: at 10:02

## 2023-01-19 RX ADMIN — IVABRADINE 2.5 MG: 5 TABLET, FILM COATED ORAL at 18:10

## 2023-01-19 RX ADMIN — IPRATROPIUM BROMIDE 2 SPRAY: 21 SPRAY, METERED NASAL at 10:02

## 2023-01-19 RX ADMIN — ASPIRIN 81 MG: 81 TABLET, COATED ORAL at 10:01

## 2023-01-19 RX ADMIN — Medication 3 MG: at 01:26

## 2023-01-19 RX ADMIN — PANTOPRAZOLE SODIUM 40 MG: 40 TABLET, DELAYED RELEASE ORAL at 10:01

## 2023-01-19 RX ADMIN — Medication 6 UNITS: at 10:01

## 2023-01-19 RX ADMIN — MILRINONE LACTATE IN DEXTROSE 0.2 MCG/KG/MIN: 200 INJECTION, SOLUTION INTRAVENOUS at 07:00

## 2023-01-19 NOTE — PROGRESS NOTES
MECHANICAL CIRCULATORY SUPPORT & TRANSPLANT EVALUATION  Advanced Heart Failure [address]ela [address]   1950            71 y.o.   male   Ethnicity:    Marital status:   [address] [place] [address]     Phone: [phone]  MRN: [id number]          INTERMACS: 2   Time of GDMT: unable to tolerate   Height:     Ht Readings from Last 1 Encounters:   01/18/23 5' 9\" (1.753 m)     Weight:   Wt Readings from Last 3 Encounters:   01/26/23 120 lb (54.4 kg)   01/25/23 123 lb (55.8 kg)   01/23/23 121 lb (54.9 kg)         BMI: 17.25  Blood type: B POSITIVE  Referring Esteban Parekh NP  Date Consented to Eval: 01/11/2023  Date of Presentation: 01/27/2023      Cardiac history:  IMPRESSION:  Acute on chronic combined systolic/diastolic  heart failure  Stage D, NYHA class IIIB/IV symptoms   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, LVEF 25-30% and 23% (by cMRI 1/3/23)  Cardiac MRI suggestive of ischemic cardiomyopathy  PYP equivocal  Initiation of chronic milrinone infusion as palliation (6MW   Coronary artery disease  CAD s/p CABG x 2: further disease best managed medically due to small vessel size   At risk of sudden cardiac death  Peripheral arterial disease  Bilateral hydronephrosis s/p donnelly  Cardiac risk factors:  HTN  HL  TRISTAN, STOP-BANG 4  DM2  CKD, stage 3  MOCA from 1/4/22 17/30, consistent with mild cognitive impairment  Full code         Devices: lifevest   Sternotomies: 1 Other medical history:  TANNER on CKD III  BPH w/ urinary retention  Hematuria  CAD s/p CABG  Type 2 DM  PAD    GERD     Other surgical history:  As above      Medications:     amiodarone (CORDARONE) Drip   milrinone (PRIMACOR)- 0.2 mcg/kg/min    digoxin (LANOXIN) 0.125 mg tablet- Every other day      finasteride (PROSCAR) 5 mg tablet- daily      pantoprazole (PROTONIX) 40 mg tablet- daily      metFORMIN (GLUCOPHAGE) 500 mg tablet-BID      ivabradine (CORLANOR) 5 mg tablet- 0.5 tablets BID      glipiZIDE (GLUCOTROL) 5 mg tablet- bid      ergocalciferol (ERGOCALCIFEROL) 1,250 mcg (50,000 unit) capsule- weekly          Januvia 50 mg tablet-daily      rosuvastatin (CRESTOR) 20 mg tablet-daily      aspirin delayed-release 81 mg tablet-daily      nitroglycerin (NITROSTAT) 0.4 mg SL tablet-prn  Allergies:  No Known Allergies          Cardiovascular imaging:  Echo   (01/09/2023): limited:    Left Ventricle: Severely reduced left ventricular systolic function with a visually estimated EF of 25 - 30%. Left ventricle size is normal. Mildly increased wall thickness. Right ventricle size is normal. Normal systolic function. (12/26/2022): complete:     Left Ventricle: Severely reduced left ventricular systolic function with a visually estimated EF of 25 - 30%. Left ventricle size is normal. Normal wall thickness. There are regional wall motion abnormalities. Grade II diastolic dysfunction with increased LAP. Right Ventricle: Moderately reduced systolic function. TAPSE is abnormal. TAPSE is 1.1 cm. Aortic Valve: Mild stenosis of the aortic valve. AV peak gradient is 13 mmHg. AV peak velocity is 1.8 m/s. Mitral Valve: Not well visualized. Moderate annular calcification at the posterior leaflet of the mitral valve. Mild to moderate regurgitation. Tricuspid Valve: Mildly elevated RVSP. Left Atrium: Left atrium is moderately dilated. LViDD not reported  Cardiac MRI: (01/03/23): IMPRESSION     1. Normal left ventricular cavity size. Severe left ventricular systolic  dysfunction. Severe hypokinesis of the base to mid and distal anterior wall,  anteroseptal wall, anteroapical wall, apical septal wall and apex. Mild  hypokinesis of the lateral wall. 3-D LVEF 23%. 2. Normal right ventricular size and systolic function. 3. Sclerotic aortic valve leaflets. Mild to moderate aortic valve stenosis. Aortic valve area is 1.3 sq cm by planimetry.   4. On T2 W imaging there is no significant myocardial edema seen. On EGE and LGE  study, there is patchy subendocardial infarct involving LAD territory including  mid to distal anterior wall, anteroseptal wall, anteroapical wall, apex, apical  septal wall involving less than 25% thickness of the myocardial wall with  thinning of the apex and apical septal wall. There is medium grade myocardial  viability of these walls. There is a small to medium size subendocardial infarct  of the basal inferior wall. There is mild patchy subepicardial enhancement of  the lateral wall suggestive of nonischemic etiology. There is no features of  infiltrative sarcoidosis or cardiac amyloidosis. 5. Normal pleura and pericardium. There is no significant effusions. PYP test: (12/29/2022): FINDINGS:  Semiquantitative visual grading of myocardial radiotracer uptake compared to osseous/rib uptake: Myocardial uptake less than rib uptake (visual score: 1).     H/CL ratio: 1.45     IMPRESSION: Nuclear Cardiac Amyloid SPECT:  Equivocal for myocardial TTR amyloidosis. [facility]: (12/06/22): Findings  1. Normal LVEDP  2. Severe native multivessel coronary artery disease  3. Patent LIMA to LAD and vein graft to distal RCA  4. Recurrent ISR in OM1 stent with now 60 to 70% restenosis  5. Recoil of left main and circumflex stent with now recurrent 40 to 50% stenosis. 6.  Progression of ostial left main disease now to about 60% stenosis  7. Progression of disease in jailed first marginal branch now with diffuse 90% stenosis  8. High-grade stenosis in the mid to distal right potential femoral artery treated with 6 x 40 mm impact drug-coated balloon angioplasty to reduce the stenosis to less than 40%     Access: Right femoral ultrasound-guided no issues  Contrast 60 cc     Recommendations  1. Continue guideline directed medical therapy for secondary prevention of coronary events  2.   Diffuse nature of disease and relatively small size of his arteries make medical management is the best possible option for his coronary artery disease. 3.  Plavix based dual antiplatelet therapy    NST: (10/27/2022):   Resting ECG ECG is abnormal. The ECG shows sinus rhythm. 0.5 mm of horizontal ST depression in the inferolateral leads was noted. The ECG shows premature atrial contractions. Stress ECG There were no arrhythmias during stress. No ST deviation was noted. There were no noted arrhythmias during recovery. The ECG was negative for ischemia. Stress Test A pharmacological stress test was performed using lexiscan. PO caffeine given as a reversal agent. Blood pressure demonstrated a normal response and heart rate demonstrated a normal response to stress. The patient's heart rate recovery was normal. The patient reported no symptoms during the stress test. The patient reached the end of the protocol. Viability: EKG: (01/26/2023)   Component 1 d ago   Ventricular Rate 138    Atrial Rate 138    P-R Interval 140    QRS Duration 72    Q-T Interval 268    QTC Calculation (Bezet) 406    Calculated P Axis 91    Calculated R Axis 91    Calculated T Axis -74    Diagnosis ** Suspect arm lead reversal, interpretation assumes no reversal   Sinus tachycardia   Rightward axis   Marked ST abnormality, possible inferior subendocardial injury   Abnormal ECG   When compared with ECG of 13-JAN-2023 13:25,   ST now depressed in Inferior leads   ST less depressed in Lateral leads   T wave inversion more evident in Inferior leads   T wave inversion less evident in Anterolateral leads   Confirmed by Artur Flanagan M.D., [facility] ([id number]) on 1/26/2023 5:37:59 PM         RHC: (01/09/2023)   PA: 20/9/13   RA Mean: 3  PCWP Mean: 8  Curtis CI: 1.82  SVR: 2297.83 dsc-5  PVR: 133.6 dsc-5    CPEST:     6MW:   6MWT    Date: 01/17/2023   Pre/Post HR: 99/104   Pre/Post SpO2: 98/97   Distance (meters): 149    Abdominal ultrasound:(01/12/2023): IMPRESSION     1. Cholelithiasis.  No evidence of acute cholecystitis. 2. Diffuse fatty infiltration of the liver. Ankle-Brachial Index: : (1/11/23): Mild arterial obstruction on the right and no hemodynamically significant arterial obstruction on the left. Moderate to severe small digit disease on the right and severe on the left. Abnormal low peak systolic brachial blood pressure on the right vs left and a more proximal obstruction/occlusion cannot be completely excluded. Carotid doppler: (1/11/23): Consistent with mild, 1-49%, stenosis of the right and left internal carotid arteries. The left vertebral artery is patent with antegrade flow. The right vertebral artery was not well visualized. The proximal common carotid artery could not be visualized due to bandage placement. Non-cardiac imaging:  CT chest/abd/pelvis: (1/16/2022): IMPRESSION  1. Small ground glass nodules scattered throughout the lungs. Findings favor  infectious etiology. Recommend short-term follow-up chest CT in 4-6 weeks. 2.  Cholelithiasis. Resolution of previous pleural effusions. (12/22/2023): IMPRESSION     1. Bladder is massively distended with mild bilateral hydronephrosis. May  indicate bladder outlet obstruction. Prostate is mildly enlarged  2. Gallstones. No acute cholecystitis  3. Small bilateral pleural effusions    Head CT: not performed     CXR: (01/26/2023)      IMPRESSION  New diffuse bilateral increased interstitial markings, partially  obscuring bilateral pulmonary nodules. CT can be performed for further  evaluation, as indicated. (01/01/23): FINDINGS: A single frontal view of the chest at 0953 hours shows stable  bibasilar atelectasis and interstitial prominence. No new focal infiltrate. Small right pleural effusion stable. .  The heart, mediastinum and pulmonary  vasculature are stable status post median sternotomy . The bony thorax is  unremarkable for age. Estil Borges      IMPRESSION  Stable bibasilar atelectasis and interstitial prominence with small right  pleural effusion. .      (12/22/23)- PA/LAT  FINDINGS:  Median sternotomy and coronary vascularization. An abandoned epicardial pacer  leads. Cardiomediastinal silhouette is upper limits of normal in size. Widespread hazy  reticulonodular interstitial opacities. Patchy streaky retrocardiac and  bibasilar atelectasis/airspace disease. Small bilateral pleural effusions. No  discernible pneumothoraces. IMPRESSION  Widespread interstitial opacities bilateral mid to lower lung opacities  concerning for edema and/or atypical infection. Small bilateral pleural  effusions. PFT - not performed in patient    Predicted Measured %   FVC      FEV1      FEV1/FVC      DLCO       EGD:    Esophagogastroduodenoscopy (EGD) Procedure Note 01/18/2023     Postoperative diagnosis: Gastric erythema  Gastric polyp    Findings:  Esophagus:normal  Stomach:Patchy erythema gastric body, biopsies done. 6 mm sessile polyp gastric body biopsied  Duodenum/jejunum:normal        Therapies:  none     Specimens: gastric body bx, gastric polyp bx           EBL: None     Complications:   None; patient tolerated the procedure well. Impression:    See Postoperative diagnosis above     Recommendations:  -Await pathology. , -Cardiac diet    Colonoscopy:Scopes 01/18/2023 Findings:  Rectum: normal  Sigmoid: normal  Descending Colon: normal  Transverse Colon: normal  Ascending Colon: normal  Cecum: normal     Specimens:    none     EBL: None     Complications: None; patient tolerated the procedure well.      Recommendations:      - Continue supportive care, cardiac diet     Signed By: Vargas Garcia MD                        January 18, 2023     Mammogram: n/a    GYN/Pap smear: n/a     Dexa scan: not performed inpatient       Labs:  CBC:  (01/27/2023):   -WBC: 6.7  -HGB: 9.2 (L)   -PLT: 229  CMP:  (01/27/2023)  -Na: 137  -K: 3.9  -Glu: 237 (H)   -BUN: 14  -Cr: 1.15  -Ca: 8.9  -Tbili 0.5  -ALT: 23  -AST: 17  -Alk phos: 121 (H)    -Albumin: 3.0 (L)   ProBNP:  (01/26/2023): 4524 (H)   ABG: (12/11/2022)   Calcium, ionized (POC)  1.22   BICARBONATE  19   Base deficit (POC)  5.4   Sample source  VENOUS BLOOD   CO2, POC  19   Sodium, POC  135 Low    Potassium, POC  6.2 High   Chloride, POC  109 High    Glucose, POC  290 High    Creatinine, POC  1.4 High    Lactic Acid (POC)  2.38 High   pH, venous (POC)  7.36   pCO2, venous (POC)  34.4 Low    pO2, venous (POC)  27      SHANIQUA:  (12/27/2022): negative   Blood culture: (12/24/2023): negative   CK: (01/04/2023): 87  Cortisol: (01/11/2023): 9.4  CRP:(01/15/2023): 1.28 (H)   COVID PCR: (12/22/2022): not detected   Digoxin level:  (01/19/2023): 0.7 (L)   ESR: (12/27/2022): 38 (H)   Fecal Occult: (01/11/2023): negative   Ferritin: (01/04/2023): 463 (H)   G6PD: (01/12/2023): 326  H.pylori Ab: (01/12/2023):  negative   Heparin Ab: (01/12/2023):  0.099  Hep C Ab: (01/11/2023): nonreactive   HgbA1C: (01/12/2023): 7.7 (H)   HIV 1,2 Ab: (01/11/2023): nonreactive   INR: (01/11/2023): 1.0  Iron sat: (01/04/2023): 29  Lactic acid: (01/06/2023): 1.3  LDH: (01/12/2023):  189  Lipid profile: (01/12/2023):    Component 1/12/23 0500   Cholesterol, total 170    Triglyceride 215 High     HDL Cholesterol 40    LDL, calculated 87    VLDL, calculated 43    CHOL/HDL Ratio 4.3      Lupus anticoagulant: (01/12/2023): not detected  Magnesium:  (01/26/2023):  1.5 (L)   MRSA Swab: (01/11/2023): not present   Plasma Free Hgb: (01/12/2023):  1.1  Prealbumin: (01/12/2023): 15.8 (L)   Procalcitonin: (01/19/2023): 0.11  Protein C : (01/12/2023) : 114  Protein S:(01/12/2023): 68  Prothrombin gene mutation: (01/12/2023): not detected   PSA:(01/17/2023) 2.2   PTH: (01/04/2023): 74.5  PTT: (01/12/2023): 27.9   RPR: (01/12/2023): nonreactive   ROLO: n/a  Troponin I: (01/26/2023): 2033 (H)   TSH  (12/27/2022): 2.12  T3:(01/12/2023): 2.1 (L)   T4 (12/27/2022): 1.1  Uric acid: (12/27/2022): 6.8  Vit D level (12/27/2022): 73.3  Gammopathy Profile:(12/27/2022):  -IgG level: 872  -M-spike: not observed   -Free Kappa: 94.2 (H)   -Free lambda: 42.4 (H)   -Kappa/lambda: 2.22 (H)      Urine Studies:  Cotinine- not obtained due to donnelly   Peth: negative   Microalbumin: (06/23/22): 7.53   Pregnancy Test: n/a   UA:   Component 1/1/23 0913   Color YELLOW/STRAW    Appearance CLEAR    Specific gravity 1.013    pH (UA) 5.5    Protein 30 Abnormal     Glucose Negative    Ketone Negative    Bilirubin Negative    Blood Negative    Urobilinogen 1.0    Nitrites Negative    Leukocyte Esterase Negative    WBC 0-4    RBC 0-5    Epithelial cells FEW    Bacteria Negative    Hyaline cast 2-5      UDS- not obtained due to donnelly   Urine Cx: (12/22/2022): negative     Transplant Labs: no tx work up due to age   CMV IgG : (12/27/2023): 5.10 (H)   EBV IgG: (12/28/2023): Positive (!)   Hep A Ab  Hep B core Ab - total   Hep B surface Ag/Ab   HSV 1/HSV2 IgG   HTLV1, 2 IgG   Mumps IgG   Quantiferon Gold   Rubella IgG   Rubeola IgG   Toxo IgG: (12/27/2023): negative   Varicella IgG HLA Class I   HLA Class II              Immunizations:  COVID: x4 doses   Hep A  Hep B  Influenza- not noted   Pneumonia  Td- 2019   Zoster       Consultations:  Specialty Date: Results:   Advanced Heart Failure 01/18/2023 PLAN OF CARE:  70 y/o male with likely combined non-ischemic and ischemic cardiomyopathy, LVEF 25-30%, stage D, NYHA class IIIB/IV; initiated on home milrinone gtt as bridge to completion of LVAD workup  Most likely etiology of cardiomyopathy: CAD +/- TRISTAN +/- inappropriate sinus tach +/- undergoing workup   Tentative discharge home after scopes end-of-week Thu/Fri   Cardiac surgery     Gastroenterology 01/18/2023 01/18/2023 * * *FINAL PATHOLOGIC DIAGNOSIS* * *     1. Stomach, body; biopsy:        Well-differentiated neuroendocrine tumor, G1; (see comment).    Chronic active gastritis with intestinal metaplasia; no epithelial   dysplasia noted. No H. pylori-type organisms identified on H&E and IHC stained sections. 2. Stomach, polyp; polypectomy:        Well-differentiated neuroendocrine tumor, G1; (see comment). Chronic active gastritis with intestinal metaplasia, surface erosion   and reactive foveolar hyperplasia; no epithelial dysplasia identified. No H. pylori-type organisms identified on H&E and IHC stained sections. Indications:   See Preoperative Diagnosis above  Referring Physician: Anni Barber MD  Anesthesia/Sedation: MAC anesthesia Propofol  Endoscopist:  Dr. Zenaida Moore  Assistant:  Endoscopy Technician-1: Yasemin Pacheco IV  Endoscopy RN-1: Aneta Rodriguez RN  Preoperative diagnosis: LVAD Work up  Postoperative diagnosis: Gastric erythema  Gastric polyp    Scopes 01/18/2023 Findings:  Rectum: normal  Sigmoid: normal  Descending Colon: normal  Transverse Colon: normal  Ascending Colon: normal  Cecum: normal     Specimens:    none     EBL: None     Complications: None; patient tolerated the procedure well. Recommendations:      - Continue supportive care, cardiac diet     Signed By: Zenaida Moore MD                        January 18, 2023     Esophagogastroduodenoscopy (EGD) Procedure Note 01/18/2023     Postoperative diagnosis: Gastric erythema  Gastric polyp    Findings:  Esophagus:normal  Stomach:Patchy erythema gastric body, biopsies done. 6 mm sessile polyp gastric body biopsied  Duodenum/jejunum:normal        Therapies:  none     Specimens: gastric body bx, gastric polyp bx           EBL: None     Complications:   None; patient tolerated the procedure well. Impression:    See Postoperative diagnosis above     Recommendations:  -Await pathology. , -Cardiac diet        Nutrition 01/18/2023 Type and Reason for Visit: Reassess     Nutrition Recommendations/Plan:   Continue with Low Fat/Low Chol/KAYLENE diet  Continue with Magic Cup and Ensure [place] daily Malnutrition Assessment:  Malnutrition Status:  Severe malnutrition (12/29/22 1147)    Context:  Acute illness     Findings of the 6 clinical characteristics of malnutrition:   Energy Intake:  Unable to assess  Weight Loss:  Greater than 5% over 1 month     Body Fat Loss: Moderate body fat loss, Buccal region, Orbital   Muscle Mass Loss: Moderate muscle mass loss, Clavicles (pectoralis & deltoids), Thigh (quadriceps), Calf  Fluid Accumulation:  No significant fluid accumulation,     Strength:  Not performed       Palliative care 01/18/2023 01/12/23 Palliative Medicine Brief note-        Thank you for consulting the palliative medicine team. We appreciate the opportunity to have been of service to Mr. Kendall and his very supportive family. At this time, Mr. Neil Chandra is making progress and his goals are clear, he is pursuing  LVAD and has begun workup. Palliative will sign off now, notified  Patric Perez NP w/ Advanced Heart Failure Team and is in agreement. Please re consult palliative should team see a need for us to see Mr. Kendall again. Thank you. PLAN:   Prior to visit completed extensive chart review, including review of current hospitalization documentation, MARS, vitals, results of labs and imaging, as well as documentation from current and prior hospitalizations. I also spoke with Patric Perez NP with advanced heart failure team.  I met with Mr. Kendall, no family at bedside at the time. He was awake, alert, oriented to time, place and situation, but mostly able to provide general information regarding hospitalization, not much detail. Mr. Neil Chandra knows that he is undergoing a work up for heart procedure, though he did not recall the name of the procedure. He told me that there are risks involved, but that if he doesn't have the procedure, he wont live very long. We discussed CPR.  Mr. Neil Chandra was familiar with CPR  but did not express his personal choice, however when I told him that he had a DNR and what that meant, he replied \"OK\". Though I do not think he will likely remember this, I did tell him that should her undergo LVAD procedure , while in surgery, they would likely have him be a Full code, explaining what that meant, he told me that he understood and was oK with this as well. I will reach out to Mrs. Kendall tomorrow to introduce Palliative services, assess her understanding of LVAD workup and what to expect. Please contact me via [employer] with any questions or concerns. Thank you for including the palliative team in Mr. Teague care. It was a pleasure meeting him today. Initial consult note routed to primary continuity provider and/or primary health care team members  Communicated plan of care with: Palliative IDT, [address] Team      Dental  To be done outpatient    As Indicated:     Nephrology 01/06/23 Plan:  Continue to hold IV diuretics  Monitor weight and edema. Will resume diuretics orally, if he starts to develop symptoms of CHF   continue IV dobutamine per cardiology/CHF team.  The dose has been slowly down titrated  continue OFF vit D; encourage increase mobility as possible  Being worked up and considered for possible LVAD  Vasquez reinserted and to be left in for now  Continue Flomax  Will need outpatient urology follow-up for voiding trial     Discussed with patient and Dr. Alfonse Hamman     Will sign off. Please call back with questions or concerns    Dr. Lucius Carloina    Hematology     Infectious diseases      Pulmonology      Endocrinology      GYN exam      Wound care  01/19/23 Assessment:   Conversational and sitting up in chair. 1. POA right heel deep tissue injury  2.7 x 2.7 x 0 cm  100% non-blanching purple; greatly unchanged but with one small split on edge  Tx: betadine applied     Wound Recommendations:    Discontinue venelex and swab with betadine daily. Allow to air dry. NO DRESSING.     Urology  01/25 01/13/23 Donnelly Removed by OP urology         Assessment/Plan:   1. Gross hematuria - resolved. Okay to resume AC  2. Retention - Maintain donnelly through discharge. Flomax and Finasteride on board. Intermittent borderline hypotension noted, consider discontinuation of Flomax if remains an issue. OP follow up with [facility] arranged 1/26 with Dr Sylvia Cedeno at [phone]     Thank you for this consult. Please contact [facility] with any further questions/concerns. Supervising MD, Dr. Jamin Quintero By: Jonathan Manuel NP - January 13, 2023    [address]      Ophthalmology      Sleep medicine   to be done outpatient     Frailty test      [facility]  01/04/2023   MOCA from 1/4/22 17/30, consistent with mild cognitive impairment   VAD education  Ongoing   Tx education   Deferred      Psychosocial and financial evaluation:  Date: 01/12/23   : Impressions/Barriers:         Psychosocial Recommendations:  Pt. Has Medicare and Medicaid, no current financial concerns, no current MH concerns, and no history of S/I. Pt. MoCA was 17/30, but pt. Was missing one hearing aide which may be a mitigating factor. Pt. Currently uses alcohol twice weekly reduced from 4x/wk. SW would suggest 3/mon screening-PETH if possible. No recent tobacco use. Pt. Has limited social support in the area, but pt. Wife resides with him and will e present 24/7 post surgery. She reports existing mobility and back issues. SW would recommend education about the specifics of being a caregiver for an LVAD patient. Potentially allow pt. Wife to speak to existing caregiver. Provide application/resources for reduced cost hearing aide and any other basic resources in addition to existing state benefits. Wife also reports 15 steps to bedroom in home and has concerns abt. Pt. Safety post-VAD in the living space. SW suggested discussing with OT/PT. Zeferino Steward LMSW, LCSW Supervisee.    [address], [address]  Phone: [phone]  Fax: [phone]      : Insurance coverage:  Transplant sites in network:    Patient name: Osmin Noble  Patient :  1950  Patient MRN:  [id number]  Date of service: 23     QUESTIONNAIRE:  Do you have medical insurance? yes  What is your primary insurance? medicaid  What is your secondary insurance? Medicare, "[employer]"  What is your coverage for medications? Medicaid  What is your insurance coverage for medical supplies? Dual Medi/Medi  Closest heart transplant facilities in net-work:  Are you currently employed? Retired. And wife is retired. 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 Pt. Wife has SSDI  Do you currently have any concerns with being able to cover any health-related expenses? No  Do you currently have any concerns about access to the following resources: food, cost of housing, electric bills, telephone bills/services, medication costs, insurance premium or copays, transportation or other. Budget is tight. No outstanding bills. Recommendations: RECOMMENDATIONS: explore grants for financial aide for living expenses. They receive food stamps, [place] emergency heating bills, and Medicaid.        Anthony Beard RN   VAD Coordinator  [facility] [address]  [address] [place] [address], [address]  Phone: [phone]  Fax: [phone]

## 2023-01-19 NOTE — WOUND CARE
WOCN Note:     Follow-up visit for right heel. Chart shows:  Admitted on 12/22/22. Admitted for sepsis, cardiogenic shock, respiratory failure. History of MI, CABG x3, DM, CHF. Assessment:   Conversational and sitting up in chair. 1. POA right heel deep tissue injury  2.7 x 2.7 x 0 cm  100% non-blanching purple; greatly unchanged but with one small split on edge  Tx: betadine applied     Wound Recommendations:    Discontinue venelex and swab with betadine daily. Allow to air dry. NO DRESSING. PI Prevention:  Turn/reposition approximately every 2 hours  Offload heels with heels hanging off end of pillow at all times while in bed. Sacral Foam dressing: lift to assess regularly; change as needed. Discontinue if incontinence is frequently soiling dressing. Low Air Loss mattress: Use only flat sheet and one incontinence pad.       Transition of Care: Plan to follow weekly and as needed while admitted to hospital.      ROSY Flores, RN, [facility]  Certified Wound, Ostomy, Continence Nurse  office [phone]  Available via [address] [place] [address]

## 2023-01-19 NOTE — PROGRESS NOTES
Attempted to schedule hospital follow up PCP appointment. Office /Nurse will follow up with PCP to Secure appointment and contact the patient with appointment information. Pending patient discharge.  Cielo Ross, Care Management Assistant

## 2023-01-19 NOTE — PROGRESS NOTES
1930 Bedside shift change report given to Chun Mirza (oncoming nurse) by Stephenie Bhat (offgoing nurse). Report included the following information SBAR, Kardex, Intake/Output, MAR, Recent Results, and Cardiac Rhythm NSR/Sinus tach . 0730 Bedside shift change report given to Chelle (oncoming nurse) by 1200 Fco Mirza (offgoing nurse). Report included the following information SBAR, Kardex, Intake/Output, MAR, Recent Results, and Cardiac Rhythm NSR/Sinus tach . Problem: Patient Education: Go to Patient Education Activity  Goal: Patient/Family Education  Outcome: Progressing Towards Goal     Problem: Falls - Risk of  Goal: *Absence of Falls  Description: Document Eudelia Romberg Fall Risk and appropriate interventions in the flowsheet.   Outcome: Progressing Towards Goal  Note: Fall Risk Interventions:  Mobility Interventions: Patient to call before getting OOB, PT Consult for assist device competence         Medication Interventions: Assess postural VS orthostatic hypotension, Bed/chair exit alarm, Evaluate medications/consider consulting pharmacy, Patient to call before getting OOB, Teach patient to arise slowly    Elimination Interventions: Call light in reach, Patient to call for help with toileting needs    History of Falls Interventions: Door open when patient unattended, Evaluate medications/consider consulting pharmacy         Problem: Patient Education: Go to Patient Education Activity  Goal: Patient/Family Education  Outcome: Progressing Towards Goal     Problem: Patient Education: Go to Patient Education Activity  Goal: Patient/Family Education  Outcome: Progressing Towards Goal     Problem: Heart Failure: Discharge Outcomes  Goal: *Demonstrates ability to perform prescribed activity without shortness of breath or discomfort  Outcome: Progressing Towards Goal  Goal: *Left ventricular function assessment completed prior to or during stay, or planned for post-discharge  Outcome: Progressing Towards Goal  Goal: *ACEI prescribed if LVEF less than 40% and no contraindications or ARB prescribed  Outcome: Progressing Towards Goal  Goal: *Verbalizes understanding and describes prescribed diet  Outcome: Progressing Towards Goal  Goal: *Verbalizes understanding/describes prescribed medications  Outcome: Progressing Towards Goal  Goal: *Describes available resources and support systems  Description: (eg: Home Health, Palliative Care, Advanced Medical Directive)  Outcome: Progressing Towards Goal  Goal: *Describes/verbalizes understanding of follow-up/return appt  Description: (eg: to physicians, diabetes treatment coordinator, and other resources  Outcome: Progressing Towards Goal  Goal: *Describes importance of continuing daily weights and changes to report to physician  Outcome: Progressing Towards Goal     Problem: Discharge Planning  Goal: *Discharge to safe environment  Outcome: Progressing Towards Goal  Goal: *Knowledge of medication management  Outcome: Progressing Towards Goal  Goal: *Knowledge of discharge instructions  Outcome: Progressing Towards Goal     Problem: Patient Education: Go to Patient Education Activity  Goal: Patient/Family Education  Outcome: Progressing Towards Goal     Problem: Pressure Injury - Risk of  Goal: *Prevention of pressure injury  Description: Document Mike Scale and appropriate interventions in the flowsheet.   Outcome: Progressing Towards Goal  Note: Pressure Injury Interventions:  Sensory Interventions: Assess need for specialty bed, Avoid rigorous massage over bony prominences, Check visual cues for pain, Discuss PT/OT consult with provider, Float heels, Keep linens dry and wrinkle-free, Maintain/enhance activity level, Minimize linen layers    Moisture Interventions: Absorbent underpads, Assess need for specialty bed, Limit adult briefs, Minimize layers    Activity Interventions: Assess need for specialty bed, Pressure redistribution bed/mattress(bed type), Increase time out of bed    Mobility Interventions: Assess need for specialty bed, Pressure redistribution bed/mattress (bed type)    Nutrition Interventions: Document food/fluid/supplement intake    Friction and Shear Interventions: Minimize layers                Problem: Patient Education: Go to Patient Education Activity  Goal: Patient/Family Education  Outcome: Progressing Towards Goal

## 2023-01-19 NOTE — NURSE NAVIGATOR
ADRIAN contacted [facility] for post discharge follow up appt. PSR awaiting approval of provider to schedule.

## 2023-01-19 NOTE — DISCHARGE SUMMARY
Discharge Summary     Patient: Moses Benson MRN: [id number]  SSN: [id number]    YOB: 1950  Age: 71 y.o.   Sex: male       Admit Date: 12/22/2022    Discharge Date: 1/19/2023      Admission Diagnoses: Sepsis [facility]) [A41.9]    Discharge Diagnoses:   Acute on chronic systolic heart failure  Cardiogenic shock  TANNER on CKD III  BPH w/ urinary retention  Hematuria  CAD s/p CABG  HTN  Hyperlipidemia  Type 2 DM  TRISTAN  PAD    GERD    Problem List as of 1/19/2023 Date Reviewed: 12/5/2022            Codes Class Noted - Resolved    RESOLVED: NSTEMI (non-ST elevated myocardial infarction) ([facility] ICD-10-CM: I21.4  ICD-9-CM: 410.70  6/22/2018 - 3/7/2019        Dyslipidemia, goal LDL below 70 ICD-10-CM: E78.5  ICD-9-CM: 272.4  7/30/2017 - Present        RESOLVED: Statin intolerance ICD-10-CM: Z78.9  ICD-9-CM: 995.27  7/30/2017 - 9/03/5181        Systolic CHF, acute on chronic (HCC) ICD-10-CM: I50.23  ICD-9-CM: 428.23, 428.0  12/29/2022 - Present        Receiving inotropic medication ICD-10-CM: Z79.899  ICD-9-CM: V49.89  12/29/2022 - Present        Sepsis ([facility] ICD-10-CM: A41.9  ICD-9-CM: 038.9, 995.91  12/22/2022 - Present        CHF (congestive heart failure) (HCC) ICD-10-CM: I50.9  ICD-9-CM: 428.0  12/12/2022 - Present        NSTEMI (non-ST elevated myocardial infarction) ([facility] ICD-10-CM: I21.4  ICD-9-CM: 410.70  12/5/2022 - Present        CKD stage 3 due to type 2 diabetes mellitus ([facility] ICD-10-CM: E11.22, N18.30  ICD-9-CM: 250.40, 585.3  1/21/2021 - Present        S/P CABG x 3 ICD-10-CM: Z95.1  ICD-9-CM: V45.81  7/9/2018 - Present    Overview Signed 7/9/2018 11:20 AM by SCAR Thorne     Coronary Artery Bypass Grafting x 3, LIMA to LAD, RSVG to OM, RSVG to RCA  Right [facility]             Coronary artery disease involving native coronary artery of native heart without angina pectoris ICD-10-CM: I25.10  ICD-9-CM: 414.01  7/30/2017 - Present        Hypertension, essential ICD-10-CM: I10  ICD-9-CM: 401.9 7/30/2017 - Present        Colon cancer screening ICD-10-CM: Z12.11  ICD-9-CM: V76.51  5/30/2017 - Present        Nonrheumatic aortic valve stenosis ICD-10-CM: I35.0  ICD-9-CM: 424.1  9/21/2016 - Present        Bruit of right carotid artery ICD-10-CM: R09.89  ICD-9-CM: 785.9  9/21/2016 - Present        Type 2 diabetes mellitus with hyperglycemia ([facility] ICD-10-CM: E11.65  ICD-9-CM: 250.00  5/26/2015 - Present        Deafness ICD-10-CM: H91.90  ICD-9-CM: 389.9  10/28/2012 - Present        Cramp of limb ICD-10-CM: R25.2  ICD-9-CM: 729.82  3/26/2012 - Present        RESOLVED: Type 2 diabetes with nephropathy ([facility] ICD-10-CM: E11.21  ICD-9-CM: 250.40, 583.81  9/15/2018 - 3/7/2019        RESOLVED: Type 2 diabetes mellitus with nephropathy ([facility] ICD-10-CM: E11.21  ICD-9-CM: 250.40, 583.81  12/14/2017 - 3/16/2018        RESOLVED: Dyspnea on exertion ICD-10-CM: R06.09  ICD-9-CM: 786.09  6/23/2017 - 9/15/2018        RESOLVED: Preop general physical exam ICD-10-CM: X93.066  ICD-9-CM: V72.83  1/5/2017 - 9/25/2019        RESOLVED: NSTEMI (non-ST elevated myocardial infarction) ([facility] ICD-10-CM: I21.4  ICD-9-CM: 410.70  11/10/2016 - 3/16/2018        RESOLVED: Essential hypertension with goal blood pressure less than 130/85 ICD-10-CM: I10  ICD-9-CM: 401.9  5/26/2015 - 9/15/2018        RESOLVED: Deafness ICD-10-CM: H91.90  ICD-9-CM: 389.9  10/28/2012 - 10/28/2012        RESOLVED: DM (diabetes mellitus) ([facility] ICD-10-CM: E11.9  ICD-9-CM: 250.00  3/26/2012 - 5/26/2015            Discharge Condition: Stable    Hospital Course: 71 y.o man w/ CHF who presented with decompensated heart failure. Acute on chronic systolic congestive heart failure   Cardiogenic shock due to above - off dobutamine on milrinone  CAD   S/p CABG   Ischemic and non-ischemic Cardiomyopathy   Appreciate help from Heart failure team; see discharge meds for final regimen   S/p RHC - with normal to low filling pressures but mildly reduced CI.    PICC line placed for home milrinone as bridge to LVAD  EGD and colonoscopy done here as part of VAD work-up     TANNER on CKD stage III   Improved - normal creatinine with holding diuretics and after some IVFluids given  Cont holding diuretics today on discharge per AHF     GERD -chronic   No change in current Rx. Continue PPI     Depression-stable  Hospital delirium  -resolved  I discontinued quetiapine and mirtazapine (started here) on discharge      T2DM - see d/c regimen     BPH -with urinary retention  Continue proscar and donnelly on DC  Hematuria resolved urology recs noted with follow up appt  Stopped flomax due to persistent hypotension     PAD   S/p Right SFA PTCA -followed by Dr. Keyshawn Redding   Normal MARIANELA     Mild but persistently elevated alk phos of unclear significance  No liver or bile/pancreas issues noted on scan and other Liver labs are normal       Consults: Cardiology, Gastroenterology, Nephrology, Pulmonary/Critical Care, Urology, and palliative care    Significant Diagnostic Studies: see above    Disposition: home w/ [facility]    S: no acute events overnight, denies pain, dyspnea, n/v/d, having. Family at bedside. Visit Vitals  BP (!) 116/47   Pulse (!) 105   Temp 97.8 °F (36.6 °C)   Resp 20   Ht 5' 9\" (1.753 m)   Wt 53 kg (116 lb 13.5 oz)   SpO2 90%   BMI 17.25 kg/m²     NAD  RR tachycardic, systolic murmur  Lungs CTAB  Abd soft NT ND      Discharge Medications:   Current Discharge Medication List        START taking these medications    Details   digoxin (LANOXIN) 0.125 mg tablet Take 1 Tablet by mouth every other day for 30 days. Qty: 15 Tablet, Refills: 0      finasteride (PROSCAR) 5 mg tablet Take 1 Tablet by mouth daily (with dinner) for 30 days. Qty: 30 Tablet, Refills: 0      pantoprazole (PROTONIX) 40 mg tablet Take 1 Tablet by mouth Daily (before breakfast) for 30 days. Qty: 30 Tablet, Refills: 0      metFORMIN (GLUCOPHAGE) 500 mg tablet Take 1 Tablet by mouth two (2) times daily (with meals) for 30 days.   Qty: 60 Tablet, Refills: 0      flash glucose sensor (FreeStyle Carolynn 2 Sensor) kit 1 Kit by Does Not Apply route every fourteen (14) days. Qty: 1 Kit, Refills: 1    Associated Diagnoses: Type 2 diabetes mellitus with hyperglycemia, unspecified whether long term insulin use (HCC)      Blood-Glucose Meter monitoring kit Patient to check blood sugar before meals, bed and as needed. E11.65  Qty: 1 Kit, Refills: 0    Comments: Please dispense kit covered by insurance. Dx code E11.65 Type 2 Diabetes with Hyperglycemia      glucose blood VI test strips (blood glucose test) strip Patient to test blood sugar before meals, bed and as needed. Qty: 100 Strip, Refills: 1    Comments: Please dispense brand covered by insurance. E11.65 Type 2 Diabetes with Hyperglycemia      lancets misc Check blood sugar before meals, bed and as needed. Qty: 100 Each, Refills: 1    Comments: Can dispense any lancets covered by insurance           CONTINUE these medications which have NOT CHANGED    Details   ivabradine (CORLANOR) 5 mg tablet Take 0.5 Tablets by mouth two (2) times daily (with meals). Qty: 30 Tablet, Refills: 11    Comments: PA completed this AM, approved this PM. (Vish Burleson) - [id number]  Associated Diagnoses: Chronic systolic congestive heart failure (HCC)      glipiZIDE (GLUCOTROL) 5 mg tablet Take 1 tablet by mouth twice daily  Qty: 180 Tablet, Refills: 1    Associated Diagnoses: Type 2 diabetes mellitus with hyperglycemia, without long-term current use of insulin (HCC)      ipratropium (ATROVENT) 21 mcg (0.03 %) nasal spray 2 Sprays by Both Nostrils route every twelve (12) hours. Qty: 30 mL, Refills: 0    Associated Diagnoses: Bronchitis      ergocalciferol (ERGOCALCIFEROL) 1,250 mcg (50,000 unit) capsule Take 1 Capsule by mouth every seven (7) days.   Qty: 12 Capsule, Refills: 0    Associated Diagnoses: Vitamin D deficiency      Januvia 50 mg tablet Take 1 tablet by mouth once daily  Qty: 90 Tablet, Refills: 0 polyethylene glycol (MIRALAX) 17 gram/dose powder Take 17 g by mouth daily. Qty: 510 g, Refills: 0    Associated Diagnoses: Chronic constipation      rosuvastatin (CRESTOR) 20 mg tablet Take 1 Tablet by mouth nightly. Qty: 90 Tablet, Refills: 3      aspirin delayed-release 81 mg tablet Take 1 Tab by mouth. nitroglycerin (NITROSTAT) 0.4 mg SL tablet 1 Tablet by SubLINGual route every five (5) minutes as needed for Chest Pain. Up to 3 doses. Qty: 25 Tablet, Refills: 3    Associated Diagnoses: Coronary artery disease involving native coronary artery of native heart without angina pectoris           STOP taking these medications       furosemide (LASIX) 20 mg tablet Comments:   Reason for Stopping:         dapagliflozin (FARXIGA) 10 mg tab tablet Comments:   Reason for Stopping:         tamsulosin (FLOMAX) 0.4 mg capsule Comments:   Reason for Stopping:         clopidogreL (Plavix) 75 mg tab Comments:   Reason for Stopping: Follow-up Information       Follow up With Specialties Details Why Contact Info    Carmela Sever, NP Nurse Practitioner Follow up on 1/18/2023 220pm [address] [address]  [phone]      [facility] Go on 1/26/2023 Roxi De La Paz 08/15 for void trail [facility] [place] [address] Pkwy Call in 1 day(s) for confirmation of home health services [address] [place], [place] [id number] [phone]    Marietta Vincent MD Endocrinology Physician, Internal Medicine Physician Go on 4/4/2023 First patient visit with endocrinologist: 10:30am 4/4/23 [address]  [place]B [place] [address] [place] [address]  [phone]      Hollie Morrow MD Family Medicine   [address] [place], [place] [id number]            Future Appointments   Date Time Provider Mina Delgado   2/1/2023  3:00 PM NICKI MARY ANN REAGAN BS AMB   2/1/2023  3:40 PM MD TEZ Escalona BS AMB   2/14/2023  8:40 AM MD HI Ruiz BS AMB   3/15/2023  1:00 PM MARY ANN KEITH BS AMB   3/15/2023  1:40 PM MD TEZ Mercado BS AMB   4/4/2023 10:30 AM Musa, Vernie Mohs, MD RDE  BS AMB     Signed By: Nettie Linn MD     January 19, 2023      Greater than 30 minutes spent on discharge management.

## 2023-01-19 NOTE — DIABETES MGMT
[facility]  DIABETES MANAGEMENT CONSULT    Consulted by  Noman Foley MD   for advanced nursing evaluation and care for inpatient blood glucose management. Evaluation and Action Plan   Ana Torres is a 71year old gentleman, with Type 2 Diabetes with a recent A1C of 6.5%, who was admitted with acute on chronic HFrEF and cardiogenic shock. He was admitted 2 weeks prior for similar complaint and antihyperglycemic agents were adjusted on discharge. Metformin was stopped due to decline in GFR and Jardiance switched to [place] (unclear why). He was compliant with his Mireya Mars, [place] and Glipizide up until day prior to this hospitalization. His glucose was significantly elevated at 458 on admission, s/t insulin resistance from acute HFrEF, elevated lactate and impaired perfusion. Low dose basal insulin was started and sufficient to control BG. There was a suspicion for myocarditis and hydrocortisone 50mg twice daily was started. Basal insulin was escalated to 48 units but with an exaggerated post-prandial glucose spike to over 400 daily- related to steroids in the post-prandial state and high carb containing nutritional shakes. Steroids weaned to off and basal now at baseline- 8 units daily. 10mg Jardiance daily was started for heart failure management. Despite Jardiance and Glargine, he continued to have pre-prandial hyperglycemia this weekend and bolus insulin resumed. He was not able to tolerate Jardiance per advanced HF and will not continue. He will therefore need an advancement in basal dosing when GI scopes complete tomorrow. Inpatient BG goal is 140-180mg/dl. Management Rationale Action Plan   Medication   Basal needs Diabetes: 0.15 units/kg as fasting over   goal without Jardiance   6 units Lantus HS- will continue this dose as will be NPO for scopes tomorrow.   Then will increase to 8 units HS after procedures complete   Nutritional needs Using moderate sensitivity based on   oral intake and degree of pre-prandial   hyperglycemia off steroids Continue 6 units Humalog/meal    Hold if patient is NPO or consumes less than 50% of carbohydrates on meal tray   Corrective insulin Using normal sensitivity Changed to Normal Sensitivity ACHS   Additional Recommendations. POC glucose ACHS    Consistent carbohydrate diet (60 grams CHO/meal). Appreciate RD help with adjustments. Discharge Planning:   Continue Januvia and Glipizide at PTA doses. Resume Metformin (as GFR stable): would start with 500mg BID    SGLT2 will stop at hospital d/c per HF    FUV with PCP for ongoing routine diabetes care. Would recommend that he is seen by endocrinologist given need for specialty diabetes care along with heart disease. He is scheduled with Gael Cook MD 4/4/23. Prescription for glucometer kit (Meter, Lancets (100), Strips (100)). Patient to obtain a blood glucose reading four times daily. First thing in the morning prior to eating and drinking anything then before lunch, dinner and bedtime. Create a log and present to PCP for interpretation. Will drop Rx off with outpatient pharmacy so that I can train him on the exact machine that he will go home with. Family interested in a CGM, which would enable close glucose monitoring. He is at risk for significant glucose fluctuations with his heart disease and would allow for titration of diabetes medications. Initial Presentation   Anival Trimble is a 71 y.o. male who presented to the ED 12/22/22 with a 3-4 day c/o gradually worsening dyspnea, fatigue, chills, constipation and RLQ pain. He endorses difficulty voiding despite compliance with lasix. In the ED, he was hypoxic and started on supplemental O2 and diuresis. LAB: WBC 25.1, , GFR 25, Lac 5.2, Trop 1096, BNP 6905  CXR: Widespread interstitial opacities bilateral mid to lower lung opacities  concerning for edema and/or atypical infection. Small bilateral pleural  effusions. CT: CT revealed distended bladder  EKG: Sinus tachycardia   Biatrial enlargement   Rightward axis   Marked ST abnormality, possible inferior subendocardial injury   Marked ST abnormality, possible lateral subendocardial injury   Abnormal ECG     Did have a hospital admission for HFrEF 12/11/22-12/16/22    HX:   Past Medical History:   Diagnosis Date    CAD (coronary artery disease) 11/10/2016    NSTEMI & 2 stents    Deafness 10/28/2012    DM (diabetes mellitus) ([facility]     Elevated cholesterol     Hypertension     NSTEMI (non-ST elevated myocardial infarction) ([facility] 11/10/2016    CKD  CAD with CABG 2018    INITIAL DX:   [facility] [A41.9]     Current Treatment     TX: IVF, Diuresis, Supplemental O2    Hospital Course   Clinical progress has been complicated by:   24/70: Initial admission with hospitalist service but with progressive hypoxia early requiring CCU transfer. Elevated troponin/BNP. BPH and hydronephrosis. Pt pan cultured, given dose of abx and dose of IVP lasix. Pt placed on BiPAP w/ improvement in oxygenation. 12/23: Seen by urology for bilateral hydronephrosis related to distended bladder. Maintain urinary catheter. 12/24: CVL placement   12/26: Decreased urine output, started on bumex and dobutamine gtt. Cardiology consult. Continue inotrope. 12/27: Advanced Heart Failure Consult. Started HF evaluation. Nephrology consult: Decreased bumex gtt  12/28: Mini pulse steroids per cardiology for treatment of myocarditis. Insulin gtt. 12/30: Transferred to hospitalist service. 1/3: Cardiac MRI: Ischemic CM  1/6: Steroids complete. Samella Come started  1/9: Right heart cath: Normal to low filling pressures but mildly reduced CI. CI of 2 l/min/m2 off dobutamine  PICC Placed and Milrinone started. Urology consult for hematuria. Held plavix x1 day. GI consult for LVAD work-up.    1/18: EGD with normal findings  Diabetes History   Type 2 Diabetes  Ambulatory BG management provided by: PCP Ruben Nieto MD    Diabetes-related Medical History  Acute complications  Acute hyperglycemia  Neurological complications  Peripheral neuropathy  Microvascular disease  Nephropathy  Macrovascular disease  CAD  Other associated conditions     CHF    Diabetes Medication History  Key Antihyperglycemic Medications               dapagliflozin (FARXIGA) 10 mg tab tablet (Taking) Take 1 Tablet by mouth daily. glipiZIDE (GLUCOTROL) 5 mg tablet (Taking) Take 1 tablet by mouth twice daily    Januvia 50 mg tablet (Taking) Take 1 tablet by mouth once daily             Diabetes self-management practices:   Eating pattern   Eats 3 small meals daily  [x] Breakfast  2 Eggs, Coffee  [x] Lunch   [place]  [x] Dinner   \"[place] Food\" Bread, rice, lentils   [x] Bedtime  [place]  [x] Snacks   Afternoon snack  [x] Beverages  Water, Coffee  Physical activity pattern   Sedentary   Monitoring pattern  Does not check BG  Taking medications pattern  [x] Consistent administration  [x] Affordable  Social determinants of health impacting diabetes self-management practices   Concerned that you need to know more about how to stay healthy with diabetes  Overall evaluation:    [x] Achieving A1c target with drug therapy & self-care practices    Subjective   Hello.      Objective   Physical exam  General Underweight male in mild distress/ill-appearing. Conversant and cooperative  Neuro  Alert, oriented   Vital Signs Visit Vitals  BP (!) 116/47   Pulse (!) 101   Temp 97.8 °F (36.6 °C)   Resp 20   Ht 5' 9\" (1.753 m)   Wt 53 kg (116 lb 13.5 oz)   SpO2 90%   BMI 17.25 kg/m²     Skin  Warm and dry. Acanthosis noted along neckline. No lipohypertrophy or lipoatrophy noted at injection sites   Heart   Regular rate and rhythm.  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 Blood glucose pattern    Significant diabetes-related events over the past 24-72 hours  A1C  6.5% 22 (down from 7.0 10/12/22)  Fasting B  Pre-prandial: 295-329  Basal: Lantus 6 units HS  Bolus: 6 units/meal  Correction: 4 units in the last 24h  Jardiance 10mg daily:D/C 1/15    Home today.   Assisted with CGM placement prior to d/c      Assessment and Nursing Intervention   Nursing Diagnosis Risk for unstable blood glucose pattern   Nursing Intervention Domain 5250 Decision-making Support   Nursing Interventions Examined current inpatient diabetes/blood glucose control   Explored factors facilitating and impeding inpatient management  Explored corrective strategies with patient and responsible inpatient provider   Informed patient of rational for insulin strategy while hospitalized     Nursing Diagnosis 71405 Ineffective Health Management   Nursing Intervention Domain 5250 Decision-making Support   Nursing Interventions Identified diabetes self-management practices impeding diabetes control  Discussed diabetes survival skills related to  Healthy Plate eating plan; given handouts  Role of physical activity in improving insulin sensitivity and action  Procedure for blood glucose monitoring & options for low-cost products  Medications plan at discharge     Billing Code(s) 05900    Before making these care recommendations, I personally reviewed the hospitalization record, including notes, laboratory & diagnostic data and current medications, and examined the patient at the bedside (circumstances permitting) before making care recommendations. More than fifty (50) percent of the time was spent in patient counseling and/or care coordination.   Total minutes: 27    SLICK Doss  Diabetes Clinical Nurse Specialist  [facility]  Access via [employer]

## 2023-01-19 NOTE — PROGRESS NOTES
0730: Bedside and Verbal shift change report given to LORI Corbett (oncoming nurse) by Karin Gatica RN (offgoing nurse). Report included the following information SBAR, Kardex, MAR, Recent Results, and Cardiac Rhythm NSR / ST . Rate verified Milrinone gtt.     0800: LifeVest at bedside with wife present. Wound care at bedside for R heel, new orders to follow. 1400: Per MD Ndiaye pt will discharge with donnelly and follow-up out-patient. 0: DM management applied and educated pt and family regarding continuous glucometer management. 1730: Discharge instructions, medication education, and follow up appointments given to patient. Pt and family verbalized understanding of all discharge instructions. Pt discharged with all belongings including LifeVest and equipment, Milrinone & home pump, rollator, wound care supplies, continuous glucometer, and donnelly care supplies including both bedside and leg bag. Pt and family provided time for clarification on questions and/ or concerns. Pt and family verbalized and acknowledged education provided. Pt denies additional questions and/ or concerns at this time. Pt discharged with LifeVest on and Milrinone pump running. Care Plan:   Problem: Diabetes Self-Management  Goal: *Disease process and treatment process  Description: Define diabetes and identify own type of diabetes; list 3 options for treating diabetes. Outcome: Progressing Towards Goal  Goal: *Incorporating nutritional management into lifestyle  Description: Describe effect of type, amount and timing of food on blood glucose; list 3 methods for planning meals. Outcome: Progressing Towards Goal  Goal: *Incorporating physical activity into lifestyle  Description: State effect of exercise on blood glucose levels.   Outcome: Progressing Towards Goal  Goal: *Developing strategies to promote health/change behavior  Description: Define the ABC's of diabetes; identify appropriate screenings, schedule and personal plan for screenings. Outcome: Progressing Towards Goal  Goal: *Using medications safely  Description: State effect of diabetes medications on diabetes; name diabetes medication taking, action and side effects. Outcome: Progressing Towards Goal  Goal: *Prevention, detection, treatment of acute complications  Description: List symptoms of hyper- and hypoglycemia; describe how to treat low blood sugar and actions for lowering  high blood glucose level. Outcome: Progressing Towards Goal  Goal: *Developing strategies to address psychosocial issues  Description: Describe feelings about living with diabetes; identify support needed and support network  Outcome: Progressing Towards Goal       Problem: Patient Education: Go to Patient Education Activity  Goal: Patient/Family Education  Outcome: Progressing Towards Goal       Problem: Falls - Risk of  Goal: *Absence of Falls  Description: [facility] and appropriate interventions in the flowsheet.   Outcome: Progressing Towards Goal  Note: Fall Risk Interventions:  Mobility Interventions: Patient to call before getting OOB, PT Consult for assist device competence    Medication Interventions: Assess postural VS orthostatic hypotension, Bed/chair exit alarm, Evaluate medications/consider consulting pharmacy, Patient to call before getting OOB, Teach patient to arise slowly    Elimination Interventions: Call light in reach, Patient to call for help with toileting needs    History of Falls Interventions: Door open when patient unattended, Evaluate medications/consider consulting pharmacy      Problem: Patient Education: Go to Patient Education Activity  Goal: Patient/Family Education  Outcome: Progressing Towards Goal       Problem: Patient Education: Go to Patient Education Activity  Goal: Patient/Family Education  Outcome: Progressing Towards Goal       Problem: Pressure Injury - Risk of  Goal: *Prevention of pressure injury  Description: Document Mike Scale and appropriate interventions in the flowsheet.   Outcome: Progressing Towards Goal  Note: Pressure Injury Interventions:  Sensory Interventions: Assess need for specialty bed, Avoid rigorous massage over bony prominences, Check visual cues for pain, Discuss PT/OT consult with provider, Float heels, Keep linens dry and wrinkle-free, Maintain/enhance activity level, Minimize linen layers    Moisture Interventions: Absorbent underpads, Assess need for specialty bed, Limit adult briefs, Minimize layers    Activity Interventions: PT/OT evaluation, Pressure redistribution bed/mattress(bed type), Increase time out of bed    Mobility Interventions: PT/OT evaluation, Pressure redistribution bed/mattress (bed type), HOB 30 degrees or less    Nutrition Interventions: Document food/fluid/supplement intake    Friction and Shear Interventions: Minimize layers, Apply protective barrier, creams and emollients, HOB 30 degrees or less       Problem: Patient Education: Go to Patient Education Activity  Goal: Patient/Family Education  Outcome: Progressing Towards Goal       Problem: Infection - Risk of, Urinary Catheter-Associated Urinary Tract Infection  Goal: *Absence of infection signs and symptoms  Outcome: Progressing Towards Goal       Problem: Patient Education: Go to Patient Education Activity  Goal: Patient/Family Education  Outcome: Progressing Towards Goal       Problem: Nutrition Deficit  Goal: *Optimize nutritional status  Outcome: Progressing Towards Goal

## 2023-01-19 NOTE — PROGRESS NOTES
Problem: Mobility Impaired (Adult and Pediatric)  Goal: *Therapy Goal (Edit Goal, Insert Text)  Description: FUNCTIONAL STATUS PRIOR TO ADMISSION: Patient was independent and active without use of DME. Patient is currently driving. Patient is independent with ADLs and IADLs. HOME SUPPORT PRIOR TO ADMISSION: The patient lived with his wife, but did not require assist.    Physical Therapy Goals  Continued 1/4/2023; remain appropriate 1/11/23  1. Patient will move from supine to sit and sit to supine, scoot up and down, and roll side to side in bed with modified independence within 7 day(s). 2.  Patient will transfer from bed to chair and chair to bed with modified independence using the least restrictive device within 7 day(s). 3.  Patient will perform sit to stand with modified independence within 7 day(s). 4.  Patient will ambulate with modified independence for 150 feet with the least restrictive device within 7 day(s). 5.  Patient will ascend/descend 13 stairs with single handrail(s) with modified independence within 7 day(s). Initiated 12/24/2022  1. Patient will move from supine to sit and sit to supine, scoot up and down, and roll side to side in bed with modified independence within 7 day(s). 2.  Patient will transfer from bed to chair and chair to bed with modified independence using the least restrictive device within 7 day(s). 3.  Patient will perform sit to stand with modified independence within 7 day(s). 4.  Patient will ambulate with modified independence for 150 feet with the least restrictive device within 7 day(s). 5.  Patient will ascend/descend 13 stairs with single handrail(s) with modified independence within 7 day(s).     Outcome: Progressing Towards Goal       PHYSICAL THERAPY TREATMENT  Patient: Nikki Rojas (76 y.o. male)  Date: 1/19/2023  Diagnosis: [facility]epsis ([facility] [A41.9] <principal problem not specified>  Procedure(s) (LRB):  ESOPHAGOGASTRODUODENOSCOPY (EGD) (N/A)  COLONOSCOPY (N/A)  ESOPHAGOGASTRODUODENAL (EGD) BIOPSY (N/A)  ENDOSCOPIC POLYPECTOMY (N/A) 1 Day Post-Op  Precautions: Fall  Chart, physical therapy assessment, plan of care and goals were reviewed. ASSESSMENT  Patient continues with skilled PT services and is progressing towards goals. Pt is performing sit to stand and gait at a mod I level with no LOB of c/o SOB/lightheaded. Pt was able to perform steps to enter and access home. Pt has good use of rollator and use of breaks, pt is hopeful to discharge home today. Current Level of Function Impacting Discharge (mobility/balance): mod I for gait     Other factors to consider for discharge: CHF         PLAN :  Patient continues to benefit from skilled intervention to address the above impairments. Continue treatment per established plan of care. to address goals. Recommendation for discharge: (in order for the patient to meet his/her long term goals)  Physical therapy at least 2 days/week in the home     This discharge recommendation:  Has not yet been discussed the attending provider and/or case management    IF patient discharges home will need the following DME: rollator has been delivered for discharge       SUBJECTIVE:   Patient stated I am good.     OBJECTIVE DATA SUMMARY:   Critical Behavior:  Neurologic State: Alert  Orientation Level: Oriented X4  Cognition: Appropriate decision making  Safety/Judgement: Awareness of environment, Fall prevention, Home safety  Functional Mobility Training:  Bed Mobility:                    Transfers:  Sit to Stand: Modified independent  Stand to Sit: Modified independent                             Balance:  Sitting: Intact  Standing: Intact; With support  Ambulation/Gait Training:  Distance (ft):  (150x4 laps)  Assistive Device: Walker, rollator  Ambulation - Level of Assistance: Modified independent        Gait Abnormalities: Decreased step clearance              Speed/Bette: Slow  Step Length: Right shortened;Left shortened              Stairs:  Number of Stairs Trained:  (4x3 trials)  Stairs - Level of Assistance: Contact guard assistance;Stand-by assistance   Rail Use: Right     Therapeutic Exercises:     Pain Rating:  none    Activity Tolerance:   Good    After treatment patient left in no apparent distress:   Sitting in chair, Call bell within reach, Bed / chair alarm activated, and Caregiver / family present    COMMUNICATION/COLLABORATION:   The patients plan of care was discussed with: Registered nurse.      Ann Day PTA   Time Calculation: 23 mins

## 2023-01-19 NOTE — PROGRESS NOTES
Transitions of Care Plan  RUR: 24% - high  Clinical Update: stable for d/c  Consults: [facility]; GI  Baseline: independent without DME; resides w wife  Barriers to Discharge: medical  Disposition:   [address]  Estimated Discharge Date: 1/19/23    CM spoke with attending MD. Patient medically stable for hospital discharge today. CM spoke with [facility] and provided update that patient will discharge today. CM spoke with [facility] and confirmed patient will discharge today. [facility] will call CM back with [facility] for education and delivery. CM spoke w patient's wife. Agreeable to discharge plan this afternoon. Advised infusion RN will provide education and teaching of home infusion pump this afternoon. CM to update on timeframe RN will be at bedside. Medicare pt has received, reviewed, and signed 2nd IM letter informing them of their right to appeal the discharge. Signed copied has been placed on pt bedside chart. Verbal with patient's wife. Disposition:  Home Health:  [facility] Infusion: [facility]  Transportation: Family  DME: Shane Collins (fitted) and Albert9 Kyleigh Whitman, MPH  Care Manager [facility]  Available via [address] [place] [address] or

## 2023-01-20 ENCOUNTER — TELEPHONE (OUTPATIENT)
Dept: CASE MANAGEMENT | Age: 73
End: 2023-01-20

## 2023-01-20 ENCOUNTER — HOME CARE VISIT (OUTPATIENT)
Dept: SCHEDULING | Facility: HOME HEALTH | Age: 73
End: 2023-01-20
Payer: MEDICARE

## 2023-01-20 ENCOUNTER — PATIENT OUTREACH (OUTPATIENT)
Dept: CASE MANAGEMENT | Age: 73
End: 2023-01-20

## 2023-01-20 VITALS
TEMPERATURE: 98.2 F | DIASTOLIC BLOOD PRESSURE: 55 MMHG | HEART RATE: 89 BPM | RESPIRATION RATE: 18 BRPM | BODY MASS INDEX: 16.98 KG/M2 | WEIGHT: 115 LBS | SYSTOLIC BLOOD PRESSURE: 102 MMHG | OXYGEN SATURATION: 100 %

## 2023-01-20 DIAGNOSIS — I50.23 ACUTE ON CHRONIC HFREF (HEART FAILURE WITH REDUCED EJECTION FRACTION) (HCC): Primary | ICD-10-CM

## 2023-01-20 PROCEDURE — G0151 HHCP-SERV OF PT,EA 15 MIN: HCPCS

## 2023-01-20 PROCEDURE — G0299 HHS/HOSPICE OF RN EA 15 MIN: HCPCS

## 2023-01-20 NOTE — TELEPHONE ENCOUNTER
Follow up appointment with PCP not on printed AVS.  Called and spoke to Mrs. Kendall. She is aware of the following virtual PCP appointment.      [id number] with Ivana Jolly MD  Tuesday Jan 24, 2023 3:40 PM  Before your visit, here are a few tips to help make your appointment go smoothly:

## 2023-01-20 NOTE — PROGRESS NOTES
Care Transitions Initial Call    Call within 2 business days of discharge: Yes     Patient: Carissa Easton Patient : 1950 MRN: [id number]    Last Discharge  [address]       Date Complaint Diagnosis Description Type Department Provider    22 Shortness of Breath Hypoxia . .. ED to Hosp-Admission (Discharged) (ADMIT) [id number] Nicho Ndiaye MD; Hallie Duran... Was this an external facility discharge? No     Challenges to be reviewed by the provider   Additional needs identified to be addressed with provider: yes    CTN added HH aide services to current [facility] orders for SN, PT and OT. Method of communication with provider : chart routing    [phone] related testing was not done at this time. 22- Respiratory panel done- negative. Advance Care Planning:   Does patient have an Advance Directive: not on file. Palliative consulted during admission, Code status changed to Full Code with LVAD work up in place. Inpatient Readmission Risk score: Unplanned Readmit Risk Score: 24    Was this a readmission? yes   Patient stated reason for the admission: increasing symptoms  Note-  - admission to [facility] for NSTEMI- s/p cath- sev 3V disease-medical mgt. - admission to [facility] for AHRF- HFrEF, AS-tachycardia.      Patients top risk factors for readmission: lack of knowledge about disease, medical condition- , medication management, and to be further assessed    Interventions to address risk factors: Communication with home health agencies or other community services the patient is currently using-[facility], Communication with specialists who will assume or re-assume care of the patient's system-specific problems-cardiology-and AHF, Education of patient/family/caregiver/guardian to support self-management- , Assessment and support for treatment adherence and medication management- , and communication with PCP    Care Transition Nurse (CTN) contacted the  spouse, Damaris Janie Enoch  by telephone to perform post hospital discharge assessment. Verified name and  with family as identifiers. Provided introduction to self, and explanation of the CTN role. CTN reviewed discharge instructions, medical action plan and red flags with family who verbalized understanding. Were discharge instructions available to patient? yes. Reviewed appropriate site of care based on symptoms and resources available to patient including: Specialist, After hours contact number- , Home Health, [facility], and CTN . Family given an opportunity to ask questions and does not have any further questions or concerns at this time. The family agrees to contact the PCP office for questions related to their healthcare. Medication reconciliation was performed with . Referral to Pharm D needed: no     Home Health/Outpatient orders at discharge: PT, OT, and SN, CTN added Aides to current orders for 3 days per week. [employer] [facility] company: [employer]   Date of initial visit: SN and PT seeing today- 23. Durable Medical Equipment ordered at discharge: LifeVest, in place prior to discharge. RW provided prior to discharge. [address] [place] [address]: [place] [id number] for [employer] received: Yes    Was patient discharged with a pulse oximeter? NA    Discussed follow-up appointments. If no appointment was previously scheduled, appointment scheduling offered:  [facility] was contacted for CARINE tant . Is follow up appointment scheduled within 7 days of discharge? yes.    [facility] follow up appointment(s):   Future Appointments   Date Time Provider Mina Delgado   2023 12:30 PM LORI Thompson   2023  2:15 PM Debbie Rudd PT [facility] [place] [address]   2023 11:00 AM LORI Abreu   2023  3:40 PM MD HI Man BS AMB   2023  3:00 PM MARY ANN CACERES AMB   2023  3:40 PM MD TEZ Ruiz BS AMB   2/3/2023  8:30 AM JACOB Shields BS AMB   2/14/2023  8:40 AM MD HI Cole BS AMB   3/15/2023  1:00 PM VASCULAR, MARY ANN REAGAN BS AMB   3/15/2023  1:40 PM MD TEZ Landa BS AMB   4/4/2023 10:30 AM Praful Bernabe MD RDE  BS [facility]     Non-[facility] follow up appointment(s): NA at this time. Plan for follow-up call in 3-5 days based on severity of symptoms and risk factors. Plan for next call:   Assess current symptoms, including daily monitoring items. [place] services in place, lab work draw schedule determined. Attended follow up with PCP- VV on 1/24. F appointment in place. Clarify with primary cardiology follow up needed, appts in place     CTN provided contact information for future needs. Goals Addressed                   This Visit's Progress       Heart Failure     Initiate and reinforce education of the patient/family about management of disease and lifestyle changes. 1/20/23- spoke with Ms. Kendall, she said he did not sleep well last night. Her family has returned home. Myocardial Infarction     COMPLETED: Patient verbalizes understanding of self -management goals after a Myocardial Infaraction. 12/20/22-  spoke with pharmacy and later Ms. Kendall- needs PA to be completed. Spoke with cardiology checking on samples(they do not have) and have no voucher. Checked with [facility] office, to see if they have voucher for free month trial-they do not have vouchers. Checked with [facility]- they state there was a PA submitted through [employer] this morning at 7 am.  Takes 24-48 hours for decision, pended to pharmacist.   Refer #  [id number]  call [phone] to check on status. CTN spoke with Ms. Kendall, she said he has not been feeling well, she understands that the corlanor is for helping with heart rate- I will update her when PA is done, spoke with pharmacist, the PA must have been approved, she was able to run RX with no co-pay costs. She checked, it is not in stock, she will need to order, she remembers ordering, but today it is not there. She will check with [facility] to see if they have it in stock. If not, hers will come in tomorrow. She was able to locate it at [address] wife agreed for pharmacist to transfer RX to that location, wife will  today. Cardiology follow up is on same day as PET scan- cardiology agreed for wife to get there as soon as PET is completed, close by. [facility]     12/19/22- spoke with Ms. Kendall, she confirms seeing cardiology NP earlier this morning. She said she got phone call from [place], CTN spoke with pharmacist, she said she has spoken with cardiology office's nurse, advised corda needs a PA done. She ordered the med, has in stock now, needs PA to fill through insurance. CTN sent message to cardiology provider. CTN relayed that Tanya Ender and Metformin discontinued, she cancelled auto fills, for now until she can talk with Ms. Kendall to clarify what he is and is not taking. Note:  Ms. Kaylee Frederick states that is hard for her to keep up with all the medication changes that are being made. CTN asked her to consider a home health nurse coming out to assess. She also said he has tried to use more pillows, but still has a hard time breathing especially when he tries to lie down. She confirms that [place], NP with cardiology ordered extra dose of furosemide for today and decreased future-continued doses. LLC     Ms. Kendall asked for CTN's help with scheduling PET scan- CTN spoke with Central scheduling, then PET and back with central scheduling, they could not see an active order, but now can see and schedule, asked for scan to be done before seeing pulmonary on 1/5- Ms. Kendall had to cancel previously scheduled for 12/19, she had to be in [place] this afternoon (PET scan was not done, he readmitted to hospital). Last scan with contrast was on 12/5/22. Scheduled for:  1/4- arrive at 9:15. CTN sent [facility] message updating on above including prep for PET. [facility]    12/12/22- CTN continuing to work to secure order and appointment for PET scan needed. Left VM at [address] [facility], central scheduling does not have order for PET scan, CTN received VM last Friday, Pulmonary office asking for fax and/or phone number to send in order to PET scan. Spoke with office, message taken, asked Keysha Johnson to call in order to [phone] and ICD 10 code needed is:  R91.8 bilateral pulmonary nodules. Saw that he was back in ED- being admitted, spoke with Stepan Bell, [address] in ED, updated her. CTN reached out to cardiology- consulted to update as well. LLC     12/9/22- spoke with his wife, Bruno Miller. Reviewed BP value parameters:  >100/50's. If SBP 90's, he is without symptoms of lightheaded, dizziness, etc, can monitor. If SBP 80's and/or symptoms call cardiology for guidance. Pharmacy had to order [place]. Advised him to take imdur 30 mg today if she does not have Entresto to administer. Monitor daily weight, reviewed good routine for weighing, reason for weighing, he is no off HCTZ and lasix. Teaching done on plavix. CTN clarifying with interventional provider if he placed stent in right femoral vs PTA only and determine follow up needed. Goal: to return to ADL's without symptoms- related to breathing and  able to increase activity level. Updated wife on OV with pulmonary, PET to be scheduled. LLC          Post Hospitalization     Supportive resources in place to maintain patient in the community (ie. Home Health, DME equipment, refer to, medication assistant plan, etc.)        1/20/23- spoke with Ms. Kendall, she said the [facility] nurse called, she will come today around 12:30. She is asking about help with his ADL's- CTN contacted [facility] office, added referral to current order for [facility] aides 3 days a week.    She is asking about equipment to have at home: raised toilet seat, has one walker but would like second one for upstairs (she has to carry from floor to floor) shower chair, CTN advised that PT and OT can help with ordering and determine insurance coverage, she relays hospital bed not covered due to current level of activity. Explained they can purchase out right, there are places who may have used lower cost equipment. PT can review options. CTN reached out to [facility] via email, asked for call prior to appointment. Spoke with Bro Samson- shared insight, she will be off after today until Wednesday next week, but will be seeing for next few weeks, this is not her usual zip code but she is experienced with inotrope therapy. PT seeing later this afternoon- CTN reached out to therapist via email to call CTN prior to appointment. [facility]     12/20/22- spoke with [facility] office, they had not received order for add skilled nursing, Verbal order taken. Will update PCP. [facility]     12/19/22-  CTN spoke with [facility] care office- [facility], they are a new company and have contracted out his PT and OT services with:  [employer]- their phone number is [phone]- they will be contacting Ms. Kendall to schedule SOC. Ms. Sheela Peterson feels he should do [place] first, get stronger and then can start CR if able. CTN asked Ms. Kendall to consider adding SN to [place] orders for education, support and assessment. CTN checked, [facility] fax number is:  [phone]. CTN will ask PCP office to fax in order for SN.      LLC

## 2023-01-21 ENCOUNTER — HOME CARE VISIT (OUTPATIENT)
Dept: SCHEDULING | Facility: HOME HEALTH | Age: 73
End: 2023-01-21
Payer: MEDICARE

## 2023-01-21 VITALS
DIASTOLIC BLOOD PRESSURE: 60 MMHG | BODY MASS INDEX: 17.72 KG/M2 | SYSTOLIC BLOOD PRESSURE: 108 MMHG | RESPIRATION RATE: 18 BRPM | HEART RATE: 86 BPM | WEIGHT: 120 LBS | OXYGEN SATURATION: 97 % | TEMPERATURE: 97.7 F

## 2023-01-21 PROCEDURE — G0299 HHS/HOSPICE OF RN EA 15 MIN: HCPCS

## 2023-01-22 VITALS
WEIGHT: 115 LBS | BODY MASS INDEX: 16.98 KG/M2 | DIASTOLIC BLOOD PRESSURE: 55 MMHG | RESPIRATION RATE: 18 BRPM | HEART RATE: 89 BPM | TEMPERATURE: 98.2 F | OXYGEN SATURATION: 100 % | SYSTOLIC BLOOD PRESSURE: 102 MMHG

## 2023-01-22 NOTE — HOME HEALTH
Subjective: Patient states he is happy to be home and continues to improve    Clinical assessment (what this visit means for the patient overall and need for ongoing skilled care) and progress or lack of progress towards SPECIFIC goals: Patient is a 78yo male who was admitted to home health services after lengthy hospital stay for complications r/t congestive heart failure. He was discharged home with LVAD and milrinone treatment. He lives with his wife in a 2-story house and has to climb roughly 15 stairs with railings to bed/bathroom. He is currently using a rollator for ambulation. He demonstrates decreased strength and impaired functional balance as noted with a score of 14/28 on Tinetti. He benefits from skilled therapy 1w1/3w2/2w2/1w2 to improve overall strength and to ensure independence with all activities. Written Teaching Material Utilized: HEP- ankle pumps, LAQ, marches x 10; 3 times a day    Interdisciplinary communication with: Cecilia Gilman for the purpose of OASIS collaboration and POC collaboration    Discharge planning as follows:  When goals are met    Specific plan for next visit: continue with education and exercise        Tinetti: 14/28

## 2023-01-23 ENCOUNTER — HOME CARE VISIT (OUTPATIENT)
Dept: SCHEDULING | Facility: HOME HEALTH | Age: 73
End: 2023-01-23
Payer: MEDICARE

## 2023-01-23 ENCOUNTER — PATIENT OUTREACH (OUTPATIENT)
Dept: CASE MANAGEMENT | Age: 73
End: 2023-01-23

## 2023-01-23 ENCOUNTER — TELEPHONE (OUTPATIENT)
Dept: CARDIOLOGY CLINIC | Age: 73
End: 2023-01-23

## 2023-01-23 PROCEDURE — G0299 HHS/HOSPICE OF RN EA 15 MIN: HCPCS

## 2023-01-23 PROCEDURE — G0156 HHCP-SVS OF AIDE,EA 15 MIN: HCPCS

## 2023-01-23 PROCEDURE — G0152 HHCP-SERV OF OT,EA 15 MIN: HCPCS

## 2023-01-23 RX ORDER — FUROSEMIDE 20 MG/1
20 TABLET ORAL
Qty: 30 TABLET | Refills: 0 | Status: ON HOLD | OUTPATIENT
Start: 2023-01-23

## 2023-01-23 NOTE — TELEPHONE ENCOUNTER
Received call from Vincent at [facility], stating that dosing weight of Milrinone is 114 lb and weight today is 121 lb. I called patient, spoke with [facility], ([phone]), who states BP today is 124/60, HT 95. She states patient is not short of breath,denies chest pain, but does have 2 plus ankle edema, lungs are clear bilat. He was discharged at 116 lb. He is currently not on any diuretics. Please advise, thank you. Per Dr. Claire Aguirre:  Target weight 120  Diuretic name Lasix  Diuretic dose 20 mg as needed only  Potassium dose none needed  Date updated 1/23/23   I notified Papito Vance, [facility], who will notify patient. I also notified [facility] to re-dose Milrinone to weight of 120 lb.

## 2023-01-23 NOTE — PROGRESS NOTES
Care Transitions Follow Up Call    Challenges to be reviewed by the provider   Additional needs identified to be addressed with provider: yes    CTN working with [facility] staff, patient/wife for:  lowering NA intake, maintaining hydration and daily monitoring items. Increasing activity. Understanding when to take PRN dose of diuretic- furosemide 20 mg. Method of communication with provider : chart routing    Care Transition Nurse (CTN) contacted the  spouse, Halle Caba  by telephone to follow up after admission on 22, 22 and 22. Verified name and  with family as identifiers. Addressed changes since last contact: refer to goals    CTN reviewed current symptoms, discharge instructions, medical action plan and red flags with family and discussed any barriers to care and/or understanding of plan of care after discharge. Discussed appropriate site of care based on symptoms and resources available to patient including: PCP, Specialist, After hours contact number- , Home Health, [employer], and [facility] . The family agrees to contact the PCP and/or specialty office for questions related to their healthcare.      [facility] follow up appointment(s):   Future Appointments   Date Time Provider Mina Delgado   2023 12:45 PM Nedra Cruz PT [place] [address] [facility]   2023  3:40 PM Sharon Russo MD PAFP BS AMB   2023 12:00 PM [facility] [address]   2023 To Be Determined Karyna Knowles  [address]   2023  4:00 PM Nedra Cruz PT [facility] [address]   2023 To Be Determined Nedra Cruz, PT [address]   2023 10:30 AM [facility]   2023 To Be Determined Karyna Knowles RN [place] [address] [facility]   2023 To Be Determined Karyna Knowles  [address]   2023 To Be Determined Elrasa Grief, [address] [facility] [address]   2023 To Be Determined Rafal Jessica [facility] [address]   1/31/2023 To Be Determined Fidencio Laguna RN [place] [address] Medical [facility]   1/31/2023 To Be Determined Pierre Hale, PT [facility]   2/1/2023 To Be Determined Qi Hu [address] [place] [address] [address]   2/1/2023  3:00 PM ECHOMARY ANN JARRETT BS AMB   2/1/2023  3:40 PM MD TEZ Carter BS AMB   2/2/2023 To Be Determined Fidencio Laguna RN [address]   2/2/2023 To Be Determined Pierre Hale, PT [facility]   2/3/2023  8:30 AM Miguel Greenwood NP [facility] BS AMB   2/3/2023 To Be Determined Nicole Ellis [facility] [address]   2/4/2023 To Be Determined Chino Bedolla RN [address]   2/7/2023 To Be Determined Pierre Hale, PT [address]   2/10/2023 To Be Determined Pierre Hale, PT [facility]   2/13/2023 To Be Determined Pierre Hale, PT [facility]   2/14/2023  8:40 AM Ora Remy MD [facility] BS AMB   2/16/2023 To Be Determined Pierre Hale, PT [address] [place] [address]   2/20/2023 To Be Determined Pierre Hale PT [address]   2/27/2023 To Be Determined Pierre Hale, PT [address] [place] [address]   3/15/2023  1:00 PM MARY ANN KEITH BS AMB   3/15/2023  1:40 PM MD TEZ Butler BS AMB   4/4/2023 10:30 AM Praful Bernabe MD RDE [place] [id number] BS AMB     Non-[facility] follow up appointment(s): NA at this time. CTN provided contact information for future needs. Plan for follow-up call in 5-7 days based on severity of symptoms and risk factors. Plan for next call:   Assess current symptoms, including daily monitoring items. Progress with documenting, understanding daily monitoring items, when to reach out to provider for guidance and when to use PRN meds to manage symptoms, etc.   Attended VV with PCP. Progress with eating low NA and maintaining hydration. Results of voiding trial appointment with urology on 1/26. Progress with home therapies.          Goals Addressed                   This Visit's Progress       Heart Failure     Patient verbalizes understanding of self-management goals of living with Congestive Heart Failure        1/23/23- spoke with Ms. Kendall. He is weighing daily- emptying donnelly, then weighing. Initial weight after being home- 115 lbs. Yesterday and this morning- 121 lbs. [place] nurse called [facility] clinic to advise of wt gain. RE: milrinone dosing and recently discontinued lasix 20 mg. [facility] instructed goal weight is 120 lbs. He will take lasix 20 mg daily until goal weight achieved. Note- CTN will clarify about using PRN lasix for swelling. She reports LE ankle edema- bilateral.  Discussion about: elevating legs during the day- he sits in recline- explains must be elevated not only off the floor. Using compression stockings during the day. Doing ankle pumps, leg raises, etc to be active. Walking regularly in the home. Discussion about NA intake. Explained the relationship between high NA intake and fluid retention, leading to dehydration- increases work on heart and kidneys. She agrees to start reading labels- stay under total of 1500 mg per day. Aim for <400 mg per intake- clarified includes food and beverages consumed. Note -she had not been reading labels, she had been  his food, later adding more seasoning to her and/or others' foods. In the setting of eating low NA-  can drink fluids to maintain hydration- discussion about hydrating vs. Non-hydrating fluids. Drink 48-64 ounces per day, half being water. Cautioned to limit-drink sparingly coffee and/or tea, for now. CTN will work with [place], patient and family - follow up in one week. [facility]            Post Hospitalization     Supportive resources in place to maintain patient in the community (ie. Home Health, DME equipment, refer to, medication assistant plan, etc.)        1/23/23- spoke with Ms. Kendall, there are [place] aides scheduled to come out to help.  Also discussed that part of OT sessions will be to work on him doing ADL's- which will include bathing, etc.     She shared that she has had previous back and cervical surgeries. Limited in physical ability to help with care. [facility]     1/20/23- spoke with Ms. Kendall, she said the [facility] nurse called, she will come today around 12:30. She is asking about help with his ADL's- CTN contacted [facility] office, added referral to current order for [facility] aides 3 days a week. She is asking about equipment to have at home: raised toilet seat, has one walker but would like second one for upstairs (she has to carry from floor to floor) shower chair, CTN advised that PT and OT can help with ordering and determine insurance coverage, she relays hospital bed not covered due to current level of activity. Explained they can purchase out right, there are places who may have used lower cost equipment. PT can review options. CTN reached out to [facility] via email, asked for call prior to appointment. Spoke with Silvio Vitale- shared insight, she will be off after today until Wednesday next week, but will be seeing for next few weeks, this is not her usual zip code but she is experienced with inotrope therapy. PT seeing later this afternoon- CTN reached out to therapist via email to call CTN prior to appointment. Later received return email from [facility] who saw him today. She provided pill box, organized meds into currently taking, separate from those meds previous discontinued. Wife more clear after action and medication review done by SN. [facility]     12/20/22- spoke with [facility] office, they had not received order for add skilled nursing, Verbal order taken. Will update PCP. [facility]     12/19/22-  CTN spoke with [facility] office- [facility], they are a new company and have contracted out his PT and OT services with:  Devika Murphy - their phone number is [phone]- they will be contacting Ms. Kendall to schedule SOC. Ms. Mercedes Resendez feels he should do [place] first, get stronger and then can start CR if able. CTN asked Ms. Kendall to consider adding SN to [facility] orders for education, support and assessment. CTN checked, [facility] fax number is:  [phone]. CTN will ask PCP office to fax in order for SN.      LLC

## 2023-01-24 ENCOUNTER — HOME CARE VISIT (OUTPATIENT)
Dept: SCHEDULING | Facility: HOME HEALTH | Age: 73
End: 2023-01-24
Payer: MEDICARE

## 2023-01-24 ENCOUNTER — VIRTUAL VISIT (OUTPATIENT)
Dept: FAMILY MEDICINE CLINIC | Age: 73
End: 2023-01-24
Payer: MEDICARE

## 2023-01-24 ENCOUNTER — TELEPHONE (OUTPATIENT)
Dept: CARDIOLOGY CLINIC | Age: 73
End: 2023-01-24

## 2023-01-24 VITALS
DIASTOLIC BLOOD PRESSURE: 60 MMHG | TEMPERATURE: 97.9 F | HEART RATE: 95 BPM | OXYGEN SATURATION: 98 % | RESPIRATION RATE: 18 BRPM | SYSTOLIC BLOOD PRESSURE: 124 MMHG

## 2023-01-24 VITALS
DIASTOLIC BLOOD PRESSURE: 60 MMHG | HEART RATE: 95 BPM | BODY MASS INDEX: 17.87 KG/M2 | SYSTOLIC BLOOD PRESSURE: 124 MMHG | WEIGHT: 121 LBS | TEMPERATURE: 97.9 F | RESPIRATION RATE: 18 BRPM | OXYGEN SATURATION: 98 %

## 2023-01-24 VITALS
DIASTOLIC BLOOD PRESSURE: 56 MMHG | TEMPERATURE: 98.2 F | OXYGEN SATURATION: 100 % | HEART RATE: 83 BPM | SYSTOLIC BLOOD PRESSURE: 102 MMHG | RESPIRATION RATE: 16 BRPM

## 2023-01-24 DIAGNOSIS — I50.22 CHRONIC SYSTOLIC CONGESTIVE HEART FAILURE (HCC): ICD-10-CM

## 2023-01-24 DIAGNOSIS — Z09 HOSPITAL DISCHARGE FOLLOW-UP: Primary | ICD-10-CM

## 2023-01-24 DIAGNOSIS — N18.30 CKD STAGE 3 DUE TO TYPE 2 DIABETES MELLITUS (HCC): ICD-10-CM

## 2023-01-24 DIAGNOSIS — E11.65 TYPE 2 DIABETES MELLITUS WITH HYPERGLYCEMIA, WITHOUT LONG-TERM CURRENT USE OF INSULIN (HCC): ICD-10-CM

## 2023-01-24 DIAGNOSIS — F51.01 PRIMARY INSOMNIA: ICD-10-CM

## 2023-01-24 DIAGNOSIS — E11.22 CKD STAGE 3 DUE TO TYPE 2 DIABETES MELLITUS (HCC): ICD-10-CM

## 2023-01-24 DIAGNOSIS — I50.22 CHRONIC SYSTOLIC CONGESTIVE HEART FAILURE (HCC): Primary | ICD-10-CM

## 2023-01-24 DIAGNOSIS — I50.23 SYSTOLIC CHF, ACUTE ON CHRONIC (HCC): ICD-10-CM

## 2023-01-24 DIAGNOSIS — I73.9 PAD (PERIPHERAL ARTERY DISEASE) (HCC): ICD-10-CM

## 2023-01-24 PROBLEM — I21.4 NSTEMI (NON-ST ELEVATED MYOCARDIAL INFARCTION) (HCC): Status: RESOLVED | Noted: 2022-01-01 | Resolved: 2023-01-01

## 2023-01-24 PROBLEM — A41.9 SEPSIS (HCC): Status: RESOLVED | Noted: 2022-01-01 | Resolved: 2023-01-01

## 2023-01-24 PROBLEM — A41.9 SEPSIS (HCC): Status: RESOLVED | Noted: 2022-12-22 | Resolved: 2023-01-24

## 2023-01-24 PROBLEM — I21.4 NSTEMI (NON-ST ELEVATED MYOCARDIAL INFARCTION) (HCC): Status: RESOLVED | Noted: 2022-12-05 | Resolved: 2023-01-24

## 2023-01-24 PROCEDURE — 99215 OFFICE O/P EST HI 40 MIN: CPT | Performed by: FAMILY MEDICINE

## 2023-01-24 PROCEDURE — G0151 HHCP-SERV OF PT,EA 15 MIN: HCPCS

## 2023-01-24 PROCEDURE — G8427 DOCREV CUR MEDS BY ELIG CLIN: HCPCS | Performed by: FAMILY MEDICINE

## 2023-01-24 RX ORDER — ZOLPIDEM TARTRATE 5 MG/1
5 TABLET ORAL
Qty: 30 TABLET | Refills: 0 | Status: ON HOLD | OUTPATIENT
Start: 2023-01-24

## 2023-01-24 NOTE — ASSESSMENT & PLAN NOTE
monitored by specialist. No acute findings meriting change in the plan   Last ejection fraction 25 to 30%, is under consideration for LVAD

## 2023-01-24 NOTE — ASSESSMENT & PLAN NOTE
monitored by specialist. No acute findings meriting change in the plan   Medication reconciliation done.   Lifestyle modification and medication adherence emphasized

## 2023-01-24 NOTE — ASSESSMENT & PLAN NOTE
borderline controlled, continue current medications, continue current treatment plan, medication adherence emphasized

## 2023-01-24 NOTE — HOME HEALTH
Skilled reason for admission/summary of clinical condition: 70 yo male s/p lengthy hospitalization related to CHF. PLOF: I with all parts of care including driving and walking without AD. pt lives in a 2 story home with his wife. Bedroom and full bath are on the 2 nd level. Diagnosis: Acute on systolic heart failure    Subjective: I am doing ok. Caregiver: relative. Caregiver assists with: Medications, Meals, Bathing, ADL, IADL, Transportation and Housekeeping Caregiver unable to assist with: NA. Caregiver is available 24 hours/day Caregiver is present at this visit and did participate with clinician. Medications reconciled and all medications are available in the home this visit. The following education was provided regarding medications: medication interactions and look alike medications: NA. Home health supplies by type and quantity ordered/delivered this visit include: NA    Patient/caregiver instructed on plan of care and are agreeable to plan of care at this time. Clinician reviewed orientation to home health booklet with patient/caregiver including agency phone number, agency complaint process, state hotline number, as well as joint [facility]'s quality hotline number. Consent forms signed. Patient at risk for falls Yes:   Recommended requesting PT/OT orders due to fall risk YES:   Patient response to recommended requesting of PT/OT orders: agreeable    Discharge planning discussed with patient and caregiver. Discharge planning as follows: Will discharge when the patient has reached their maximum functional potential and maximum safety in their home and When goals are met Patient/caregiver did verbalize agreement with discharge planning. Clinical Assessment (What this means for the patient overall and need for ongoing skilled care):Personable and motivated, previously I with all parts of care.  patient presents at high risk of falls (MAHC-10 = 5), and mod A for safe ADL completion (Modified Barthel Index = 50/100) during OT Eval. He is currently unsafe with ADLs due to quickly fatiguing and requires assistance for all ADLS at this time. pt is in need of a shower chair to help conserve energy. will assist pt with ordering. Specific plan for next visit: Re-instruct patient/caregiver on safety, ADL training    Plan of care and admission to home health status called to attending physician. The following practitioner has agreed to sign the ongoing POC Dr. Shiva Ruiz, and was notified of the following: visit frequency of 2w3 1w1. Interdisciplinary communication with: Gunner Fraire for the purpose of POC collaboration    PCP: Renee Kimbrough  Next scheduled doctor appointment: 1/26/23 with urology  Patient/Caregiver instructed to keep follow up appointment because lack of follow through with physician appointments could result in discontinuation of home care services for non-compliance. Patient/Caregiver verbalize knowledge of above through teach back with 100 percent accuracy. .  Modified Barthel:   Feeding 10  Bathing 0   Grooming 5  Dressing 0 5  Bowels 10  Bladder 0   Toilet Use 0 5   Transfer 0 5   Mobility 0 5   Stairs 5  50/100- mod A needed for ADLs.

## 2023-01-24 NOTE — PROGRESS NOTES
Chief Complaint   Patient presents with    Transitions Of Care     Follow up           1. \"Have you been to the ER, urgent care clinic since your last visit? Hospitalized since your last visit? \" Yes When: 12/22/22 Where: er Reason for visit: sepsis    2. \"Have you seen or consulted any other health care providers outside of the [address] [place] [address] since your last visit? \" No     3. For patients aged 39-70: Has the patient had a colonoscopy / FIT/ Cologuard? No      If the patient is female:    4. For patients aged 41-77: Has the patient had a mammogram within the past 2 years? NA - based on age or sex      11. For patients aged 21-65: Has the patient had a pap smear?  NA - based on age or sex       Financial Resource Strain: Medium Risk    Difficulty of Paying Living Expenses: Somewhat hard      Food Insecurity: Food Insecurity Present    Worried About [address] in the Last Year: Never true    Ran Out of Food in the Last Year: Often true        3 most recent PHQ Screens 12/19/2022   Little interest or pleasure in doing things Not at all   Feeling down, depressed, irritable, or hopeless Not at all   Total Score PHQ 2 0   Trouble falling or staying asleep, or sleeping too much -   Feeling tired or having little energy -   Poor appetite, weight loss, or overeating -   Feeling bad about yourself - or that you are a failure or have let yourself or your family down -   Trouble concentrating on things such as school, work, reading, or watching TV -   Moving or speaking so slowly that other people could have noticed; or the opposite being so fidgety that others notice -   Thoughts of being better off dead, or hurting yourself in some way -   PHQ 9 Score -   How difficult have these problems made it for you to do your work, take care of your home and get along with others -       Health Maintenance Due   Topic Date Due    Medicare Yearly Exam  02/04/2023

## 2023-01-25 ENCOUNTER — HOME CARE VISIT (OUTPATIENT)
Dept: SCHEDULING | Facility: HOME HEALTH | Age: 73
End: 2023-01-25
Payer: MEDICARE

## 2023-01-25 PROCEDURE — G0156 HHCP-SVS OF AIDE,EA 15 MIN: HCPCS

## 2023-01-25 PROCEDURE — G0299 HHS/HOSPICE OF RN EA 15 MIN: HCPCS

## 2023-01-25 NOTE — TELEPHONE ENCOUNTER
Patient's wife returned call regarding testing. Educated on PFT scheduling. Given number to coordination  of care. Patient's wife verbalized understanding. States patient still has donnelly, has urology follow up tomorrow.

## 2023-01-25 NOTE — HOME HEALTH
Subjective: \"My foot hurts worse at night. \"  Falls since last visit: NO  Caregiver involvement changes: No  Home health supplies by type and quantity ordered/delivered this visit include: None    Clinician asked if patient has had any physician contact since last home care visit and patient states: NO  Clinician asked if patient has any new or changed medications and patient states:  NO   If Yes, were medications reconciled? N/A   Was the certifying physician notified of changes in medications? N/A     Clinical assessment (what this visit means for the patient overall and need for ongoing skilled care) and progress or lack of progress towards SPECIFIC goals: Patient is on IV Milrinone to RUE PICC line. PICC dressing remains clean and intact and was NOT changed at this visit but due to be changed along with lab draw at next scheduled visit on 1/25/23. No s/s of infection were noted at time of SN assessment. Patient received a call from [address] at [address] [place] [address] while SN was present in the home. SN spoke with [address] who stated they received a call from [address] [place] [address] that patient's weight was up. [address] states they will send patient a prescription for Lasix 20mg to his pharmacy to take if weight is above target weight of 120lb. This information was relayed to patient and his caregiver and all of their questions were answered. SN instructed patient and caregiver to call home health agency or Dr. Yoon Peoples office if they had additional questions. Patient had mild edema to bilateral ankles in which patient states worsens at night. SN educated patient on maintaining a low-sodium diet and to keep feet elevated as much as possible.       Interdisciplinary communication with: Fede Gabriel RN for the purpose of POC updates    Discharge planning as follows: Per physician order and When patient is no longer able to participate or progresses to SNF/Hospice    Specific plan for next visit: draw labs, change PICC dressing, change Milrinone medication bag

## 2023-01-25 NOTE — HOME HEALTH
Subjective: Patient states that he had a 2 pound weight gain over night and his legs are more swollen. Called MD and put him on lasix. Falls since last visit NO(if yes complete the Fall Tracking Form and include bsrifallreport):  Caregiver involvement changes: None  Home health supplies by type and quantity ordered/delivered this visit include: None    Clinician asked if patient has had any physician contact since last home care visit and patient states: YES  Clinician asked if patient has any new or changed medications and patient states:  YES lasix 20 mg  If Yes, were medications reconciled? YES   Was the certifying physician notified of changes in medications? N/A MD made changes    Clinical assessment (what this visit means for the patient overall and need for ongoing skilled care) and progress or lack of progress towards SPECIFIC goals: Patient progressing with exercises but still requires cueing. Exercise goal not met. Patient demonstrates improved ambulation but requires assistance for dynamic stability. Gait goal not met. Written Teaching Material Utilized: HEP    Interdisciplinary communication with: N/A    Discharge planning as follows:  When goals are met    Specific plan for next visit: continue with strength and balance

## 2023-01-26 ENCOUNTER — TELEPHONE (OUTPATIENT)
Dept: CARDIOLOGY CLINIC | Age: 73
End: 2023-01-26

## 2023-01-26 ENCOUNTER — PATIENT OUTREACH (OUTPATIENT)
Dept: CASE MANAGEMENT | Age: 73
End: 2023-01-26

## 2023-01-26 ENCOUNTER — HOME CARE VISIT (OUTPATIENT)
Dept: HOME HEALTH SERVICES | Facility: HOME HEALTH | Age: 73
End: 2023-01-26
Payer: MEDICARE

## 2023-01-26 VITALS
WEIGHT: 123 LBS | HEART RATE: 98 BPM | DIASTOLIC BLOOD PRESSURE: 58 MMHG | SYSTOLIC BLOOD PRESSURE: 118 MMHG | RESPIRATION RATE: 18 BRPM | OXYGEN SATURATION: 100 % | TEMPERATURE: 99 F | BODY MASS INDEX: 18.16 KG/M2

## 2023-01-26 LAB
ALBUMIN SERPL-MCNC: 3.8 G/DL (ref 3.7–4.7)
ALBUMIN/GLOB SERPL: 1.7 {RATIO} (ref 1.2–2.2)
ALP SERPL-CCNC: 134 IU/L (ref 44–121)
ALT SERPL-CCNC: 21 IU/L (ref 0–44)
AST SERPL-CCNC: 18 IU/L (ref 0–40)
BASOPHILS # BLD AUTO: 0 X10E3/UL (ref 0–0.2)
BASOPHILS NFR BLD AUTO: 1 %
BILIRUB SERPL-MCNC: 0.4 MG/DL (ref 0–1.2)
BUN SERPL-MCNC: 20 MG/DL (ref 8–27)
BUN/CREAT SERPL: 18 (ref 10–24)
CALCIUM SERPL-MCNC: 9.3 MG/DL (ref 8.6–10.2)
CHLORIDE SERPL-SCNC: 103 MMOL/L (ref 96–106)
CO2 SERPL-SCNC: 18 MMOL/L (ref 20–29)
CREAT SERPL-MCNC: 1.14 MG/DL (ref 0.76–1.27)
EGFR: 69 ML/MIN/1.73
EOSINOPHIL # BLD AUTO: 0.1 X10E3/UL (ref 0–0.4)
EOSINOPHIL NFR BLD AUTO: 3 %
ERYTHROCYTE [DISTWIDTH] IN BLOOD BY AUTOMATED COUNT: 21 % (ref 11.6–15.4)
GLOBULIN SER CALC-MCNC: 2.3 G/DL (ref 1.5–4.5)
GLUCOSE SERPL-MCNC: 206 MG/DL (ref 70–99)
HCT VFR BLD AUTO: 30.8 % (ref 37.5–51)
HGB BLD-MCNC: 9.2 G/DL (ref 13–17.7)
IMM GRANULOCYTES # BLD AUTO: 0 X10E3/UL (ref 0–0.1)
IMM GRANULOCYTES NFR BLD AUTO: 1 %
LYMPHOCYTES # BLD AUTO: 1.5 X10E3/UL (ref 0.7–3.1)
LYMPHOCYTES NFR BLD AUTO: 31 %
MAGNESIUM SERPL-MCNC: 1.5 MG/DL (ref 1.6–2.3)
MCH RBC QN AUTO: 24.9 PG (ref 26.6–33)
MCHC RBC AUTO-ENTMCNC: 29.9 G/DL (ref 31.5–35.7)
MCV RBC AUTO: 84 FL (ref 79–97)
MONOCYTES # BLD AUTO: 0.5 X10E3/UL (ref 0.1–0.9)
MONOCYTES NFR BLD AUTO: 11 %
NEUTROPHILS # BLD AUTO: 2.7 X10E3/UL (ref 1.4–7)
NEUTROPHILS NFR BLD AUTO: 53 %
NT-PROBNP SERPL-MCNC: 5052 PG/ML (ref 0–376)
PLATELET # BLD AUTO: 218 X10E3/UL (ref 150–450)
POTASSIUM SERPL-SCNC: 4.7 MMOL/L (ref 3.5–5.2)
PROT SERPL-MCNC: 6.1 G/DL (ref 6–8.5)
RBC # BLD AUTO: 3.69 X10E6/UL (ref 4.14–5.8)
SODIUM SERPL-SCNC: 137 MMOL/L (ref 134–144)
WBC # BLD AUTO: 4.8 X10E3/UL (ref 3.4–10.8)

## 2023-01-26 NOTE — H&P
History & Physical    Primary Care Provider: Mikhail Cowan MD  Source of Information: Patient and chart review    History of Presenting Illness:   Lauren Reina is a 71 y.o. male with history of ischemic cardiomyopathy, CAD status post PCI x2 HFrEF EF 25-30 stage D, NYHA class IIIb recently placed on milrinone drip as bridge to LVAD therapy, type 2 diabetes, hypertension, dyslipidemia, BPH, dyslipidemia, TRISTAN, CKD stage III who presents to hospital with complaints of elevated heart rate, difficulty with urination. Patient and wife state he had felt only mild improvement since discharge from hospital after his recent hospitalization. He was scheduled for outpatient voiding trial with urology and had Vasquez catheter removed. Wife states patient was able to void about 140 mL. Wife states since discharge, she has noted orthopnea, persistent tachycardia with heart rates in 120s and low BPs with systolics in the 66G as well as bilateral lower extremity edema. Patient however states he has not experienced any significant breathing issues. The patient denies any fever, chills, chest or abdominal pain, nausea, vomiting, cough, congestion, recent illness, palpitations, or dysuria. Remarkable vitals on ER Presentation: Heart rate 126  Labs Remarkable for: HGB 8.6, TROP 2033, glu 198, cr 1.36, bnp   ER Images: cxr: New diffuse bilateral increased interstitial markings, partially  obscuring bilateral pulmonary nodules. CT can be performed for further  evaluation, as indicated. Review of Systems:  Pertinent items are noted in the History of Present Illness.      Past Medical History:   Diagnosis Date    CAD (coronary artery disease) 11/10/2016    NSTEMI & 2 stents    Deafness 10/28/2012    DM (diabetes mellitus) ([facility]     Elevated cholesterol     Hypertension     NSTEMI (non-ST elevated myocardial infarction) ([facility] 11/10/2016      Past Surgical History:   Procedure Laterality Date COLONOSCOPY N/A 6/28/2018    COLONOSCOPY performed by Jonathan Crouch MD at [facility] ENDOSCOPY    COLONOSCOPY N/A 1/18/2023    COLONOSCOPY performed by Rajat Morin MD at [facility] ENDOSCOPY    [address]  11/11/2016    2 stents     Prior to Admission medications    Medication Sig Start Date End Date Taking? Authorizing Provider   zolpidem (AMBIEN) 5 mg tablet Take 1 Tablet by mouth nightly as needed for Sleep. Max Daily Amount: 5 mg. 1/24/23   Governor Niko MD   furosemide (LASIX) 20 mg tablet Take 1 Tablet by mouth daily as needed (for weight greater than 120 lb by 2-3 lb or shortness of breath). 1/23/23   Saman Linton, NP   milrinone (PRIMACOR) 20 mg/100 mL infusion 0.2 mcg/kg/min by IntraVENous route continuous. infuse at 0.2mcg/kg/min with dosage weight of 52kgs continuously    Matilde Knight MD   sodium chloride (NS) 10 mL by IntraVENous route daily. flush open port on PICC line with 10mL NS daily and prn for lab draws. Provider, Historical   heparin sod,porcine/0.9 % NaCl (HEPARIN FLUSH IV) 5 mL by IntraVENous route daily. flush open port on PICC line with 5mL daily and prn after blood draws. Provider, Historical   milrinone lactate/D5W (MILRINONE IN D5W IV) 34 mg by IntraVENous route every fourty-eight (48) hours. 0.2mcg/kg/min  0.2mg/ml  3.1ml/hr    Provider, Historical   digoxin (LANOXIN) 0.125 mg tablet Take 1 Tablet by mouth every other day for 30 days. 1/20/23 2/19/23  Twyla Ndiaye MD   finasteride (PROSCAR) 5 mg tablet Take 1 Tablet by mouth daily (with dinner) for 30 days. 1/19/23 2/18/23  Nilay Driscoll MD   pantoprazole (PROTONIX) 40 mg tablet Take 1 Tablet by mouth Daily (before breakfast) for 30 days. 1/20/23 2/19/23  Twyla Ndiaye MD   metFORMIN (GLUCOPHAGE) 500 mg tablet Take 1 Tablet by mouth two (2) times daily (with meals) for 30 days.  1/19/23 2/18/23  Twyla Ndiaye MD   flash glucose sensor (FreeStyle Magana 2 Sensor) kit 1 Kit by Does Not Apply route every fourteen (14) days. 1/17/23   SLICK Hernandez   lancets misc Check blood sugar before meals, bed and as needed. 1/17/23   SLICK Hernandez   ivabradine (CORLANOR) 5 mg tablet Take 0.5 Tablets by mouth two (2) times daily (with meals). 12/20/22   Jono Mccormick NP   glipiZIDE (GLUCOTROL) 5 mg tablet Take 1 tablet by mouth twice daily 12/2/22   Loc Isaacs MD   ipratropium (ATROVENT) 21 mcg (0.03 %) nasal spray 2 Sprays by Both Nostrils route every twelve (12) hours. 11/21/22   Loc Isaacs MD   nitroglycerin (NITROSTAT) 0.4 mg SL tablet 1 Tablet by SubLINGual route every five (5) minutes as needed for Chest Pain. Up to 3 doses. 11/16/22   Elmer Amador MD   ergocalciferol (ERGOCALCIFEROL) 1,250 mcg (50,000 unit) capsule Take 1 Capsule by mouth every seven (7) days. 10/14/22   Loc Isaacs MD   Januvia 50 mg tablet Take 1 tablet by mouth once daily 9/23/22   Loc Isaacs MD   polyethylene glycol (MIRALAX) 17 gram/dose powder Take 17 g by mouth daily. Patient taking differently: Take 17 g by mouth daily as needed for Constipation. 5/31/22   Loc Isaacs MD   rosuvastatin (CRESTOR) 20 mg tablet Take 1 Tablet by mouth nightly. 2/28/22   Link Ferreira MD   aspirin delayed-release 81 mg tablet Take 1 Tab by mouth.     Provider, Historical     No Known Allergies   Family History   Problem Relation Age of Onset    Heart Disease Father     Heart Attack Father     Hypertension Mother     Elevated Lipids Brother     Elevated Lipids Brother     No Known Problems Sister     Elevated Lipids Brother     No Known Problems Son     No Known Problems Daughter     Anesth Problems Neg Hx         SOCIAL HISTORY:  Patient resides:  Independently x   Assisted Living    SNF    With family care       Smoking history:   None x   Former    Chronic      Alcohol history:   None x   Social    Chronic      Ambulates:   Independently x   w/cane w/walker    w/wc    CODE STATUS:  DNR    Full x   Other      Objective:     Physical Exam:     Visit Vitals  /66   Pulse (!) 126   Temp 98 °F (36.7 °C)   Resp 23   Ht 5' 9\" (1.753 m)   Wt 54.4 kg (120 lb)   SpO2 98%   BMI 17.72 kg/m²      O2 Device: None (Room air)    General:  Alert, cooperative, no distress, appears stated age. Head:  Normocephalic, without obvious abnormality, atraumatic. Eyes:  Conjunctivae/corneas clear. PERRL, EOMs intact. Nose: Nares normal. Septum midline. Mucosa normal.        Neck: Supple, symmetrical, trachea midline, no carotid bruit and no JVD. Lungs:   Clear to auscultation bilaterally. Chest wall:  No tenderness or deformity. Heart:  Regular rate and rhythm, S1, S2 normal, 3/6 pansystolic murmur   Abdomen:   Soft, non-tender. Bowel sounds normal. No masses,  No organomegaly. Extremities: Extremities normal, atraumatic, no cyanosis. 1+ nonpitting edema. Pulses: 2+ and symmetric all extremities. Skin: Skin color, texture, turgor normal. No rashes or lesions   Neurologic: CNII-XII intact. Data Review:     Recent Days:  Recent Labs     01/26/23  1651 01/25/23  1155   WBC 5.3 4.8   HGB 8.9* 9.2*   HCT 30.9* 30.8*    218     Recent Labs     01/26/23  1651 01/25/23  1155    137   K 4.6 4.7    103   CO2 21 18*   * 206*   BUN 19 20   CREA 1.36* 1.14   CA 9.3 9.3   MG  --  1.5*   ALB 3.2* 3.8   ALT 27 21     No results for input(s): PH, PCO2, PO2, HCO3, FIO2 in the last 72 hours.     24 Hour Results:  Recent Results (from the past 24 hour(s))   EKG, 12 LEAD, INITIAL    Collection Time: 01/26/23  3:17 PM   Result Value Ref Range    Ventricular Rate 138 BPM    Atrial Rate 138 BPM    P-R Interval 140 ms    QRS Duration 72 ms    Q-T Interval 268 ms    QTC Calculation (Bezet) 406 ms    Calculated P Axis 91 degrees    Calculated R Axis 91 degrees    Calculated T Axis -74 degrees    Diagnosis       ** Suspect arm lead reversal, interpretation assumes no reversal  Sinus tachycardia  Rightward axis  Marked ST abnormality, possible inferior subendocardial injury  Abnormal ECG  When compared with ECG of 13-JAN-2023 13:25,  ST now depressed in Inferior leads  ST less depressed in Lateral leads  T wave inversion more evident in Inferior leads  T wave inversion less evident in Anterolateral leads  Confirmed by Sandrita Hernandez M.D., Karl Miranda ([id number]) on 1/26/2023 5:37:59 PM     CBC WITH AUTOMATED DIFF    Collection Time: 01/26/23  4:51 PM   Result Value Ref Range    WBC 5.3 4.1 - 11.1 K/uL    RBC 3.64 (L) 4.10 - 5.70 M/uL    HGB 8.9 (L) 12.1 - 17.0 g/dL    HCT 30.9 (L) 36.6 - 50.3 %    MCV 84.9 80.0 - 99.0 FL    MCH 24.5 (L) 26.0 - 34.0 PG    MCHC 28.8 (L) 30.0 - 36.5 g/dL    RDW 23.3 (H) 11.5 - 14.5 %    PLATELET 604 243 - 988 K/uL    MPV 10.6 8.9 - 12.9 FL    NRBC 0.0 0  WBC    ABSOLUTE NRBC 0.00 0.00 - 0.01 K/uL    NEUTROPHILS 53 32 - 75 %    LYMPHOCYTES 32 12 - 49 %    MONOCYTES 11 5 - 13 %    EOSINOPHILS 2 0 - 7 %    BASOPHILS 1 0 - 1 %    IMMATURE GRANULOCYTES 1 (H) 0.0 - 0.5 %    ABS. NEUTROPHILS 2.7 1.8 - 8.0 K/UL    ABS. LYMPHOCYTES 1.7 0.8 - 3.5 K/UL    ABS. MONOCYTES 0.6 0.0 - 1.0 K/UL    ABS. EOSINOPHILS 0.1 0.0 - 0.4 K/UL    ABS. BASOPHILS 0.1 0.0 - 0.1 K/UL    ABS. IMM.  GRANS. 0.1 (H) 0.00 - 0.04 K/UL    DF SMEAR SCANNED      RBC COMMENTS ANISOCYTOSIS  2+        RBC COMMENTS HYPOCHROMIA  1+        RBC COMMENTS OVALOCYTES  1+       TROPONIN-HIGH SENSITIVITY    Collection Time: 01/26/23  4:51 PM   Result Value Ref Range    Troponin-High Sensitivity 2,033 (HH) 0 - 76 ng/L   METABOLIC PANEL, COMPREHENSIVE    Collection Time: 01/26/23  4:51 PM   Result Value Ref Range    Sodium 136 136 - 145 mmol/L    Potassium 4.6 3.5 - 5.1 mmol/L    Chloride 106 97 - 108 mmol/L    CO2 21 21 - 32 mmol/L    Anion gap 9 5 - 15 mmol/L    Glucose 198 (H) 65 - 100 mg/dL    BUN 19 6 - 20 MG/DL    Creatinine 1.36 (H) 0.70 - 1.30 MG/DL    BUN/Creatinine ratio 14 12 - 20      eGFR 56 (L) >60 ml/min/1.73m2    Calcium 9.3 8.5 - 10.1 MG/DL    Bilirubin, total 0.4 0.2 - 1.0 MG/DL    ALT (SGPT) 27 12 - 78 U/L    AST (SGOT) 20 15 - 37 U/L    Alk. phosphatase 128 (H) 45 - 117 U/L    Protein, total 7.0 6.4 - 8.2 g/dL    Albumin 3.2 (L) 3.5 - 5.0 g/dL    Globulin 3.8 2.0 - 4.0 g/dL    A-G Ratio 0.8 (L) 1.1 - 2.2     SAMPLES BEING HELD    Collection Time: 01/26/23  4:51 PM   Result Value Ref Range    SAMPLES BEING HELD 1RED,1BLU     COMMENT        Add-on orders for these samples will be processed based on acceptable specimen integrity and analyte stability, which may vary by analyte. NT-PRO BNP    Collection Time: 01/26/23  4:51 PM   Result Value Ref Range    NT pro-BNP 4,524 (H) <125 PG/ML         Imaging:     Assessment:     Lauren Reina is a 71 y.o. male with history of ischemic cardiomyopathy, CAD status post PCI x2 HFrEF EF 25-30 stage D, NYHA class IIIb recently placed on milrinone drip as bridge to LVAD therapy, type 2 diabetes, hypertension, dyslipidemia, BPH, dyslipidemia, TRISTAN, CKD stage III who is        Plan:       Decompensated HFrEF LVEF 25-30% / Ischemic Cardiomyopathy / Milrinone gtt  CAD s/p CABG x2  -Continue amiodarone gtt.  -Continue diuresis with Lasix 40 mg IV twice daily  -Strict I's and O's  -Advanced heart failure consult in a.m.    BPH with urinary retention  -Failed recent voiding trial  -Plan to place Vasquez back in    CKD stage III   -Stable.   Monitor with IV diuresis  GERD -chronic Continue PPI    PAD  / S/p Right SFA PTCA -followed by Dr. Jose Saleh   -Normal ABIs on recent admission         FEN/GI -  po hydration  Activity - as tolerated  DVT prophylaxis - scds  GI prophylaxis -  NI  Disposition - Home    CODE STATUS:   full code       Signed By: Emilie Putnam MD     January 26, 2023

## 2023-01-26 NOTE — PROGRESS NOTES
Care Transitions Follow Up Call    Challenges to be reviewed by the provider   Additional needs identified to be addressed with provider: yes    No change in weight values since resuming furosemide 20 mg daily. Relayed current symptoms to F via CC message. CTN working with [facility] team on resources needed in home. Voiding trial at Urology this morning, donnelly removed, remains out. Method of communication with provider : staff message    Care Transition Nurse (CTN) was contacted by the [facility] nurse and later contacted the  patient/wife  by telephone to follow up after admission on 22, 22 and 22. Verified name and  with  both  as identifiers. Addressed changes since last contact: refer to goals section and above yellow box     CTN reviewed current symptoms, discharge instructions, medical action plan and red flags with  spouse  and discussed any barriers to care and/or understanding of plan of care after discharge. Discussed appropriate site of care based on symptoms and resources available to patient including: PCP, Specialist, After hours contact number- , [address], and CTN . The CTN is working with patient and family to know when to contact the PCP and/or specialist office for questions related to their healthcare.      [facility] follow up appointment(s):   Future Appointments   Date Time Provider Department Center   2023 To Be Determined Addison Mayfield [facility]   2023 11:30 AM Dez Garza [facility] [address]   2023 To Be Determined Elizabeth Esteves RN [address]   2023 To Be Determined Cinthia Head, PT [address]   2023 To Be Determined Elizabeth Esteves RN [address]   2023 To Be Determined Cinthia Head,  [address]   2023  9:30 [facility]   2023 To Be Determined Elizabeth Esteves RN [address]   2023 To Be Determined Jori Carrington [facility]   1/31/2023 To Be Determined Leah Márquez  [address] [facility]   2/1/2023 10:00 AM Tad [facility]   2/1/2023  3:00 PM MARY ANN CACERES BS AMB   2/1/2023  3:40 PM MD TEZ Gallardo BS AMB   2/2/2023 To Be Determined Jamil Dyson RN [place] [address] [address] [facility]   2/2/2023 To Be Determined Jori Rolylakeshia [facility]   2/2/2023 To Be Determined Leah Márquez PT [facility] [address]   2/2/2023  2:30 PM PULMONARY LAB [facility]   2/3/2023  8:30 AM JACOB Salazar BS AMB   2/3/2023 10:00 AM [facility]   2/4/2023 To Be Determined Ajay Zuleta [facility]   2/6/2023 To Be Determined Juan Carlos Wilkinson [facility]   2/6/2023 To Be Determined [facility]   2/7/2023 To Be Determined Leah M[facility]quez PT [facility]   2/8/2023 To Be Determined Renell Schwab [facility] [address]   2/8/2023 To Be Determined Jori Carrington [facility]   2/10/2023 To Be Determined Leah Márquez PT [facility]   2/10/2023 To Be Determined Renell Schwab [place] [address]   2/13/2023 To Be Determined Leah Márquez PT [facility]   2/13/2023 To Be Determined Renell Schwab [place] [address]   2/14/2023  8:40 AM Veena Mishra MD [facility] BS AMB   2/15/2023 To Be Determined Kati Strauss OT [facility]   2/15/2023 To Be Determined [facility] [address]   2/16/2023 To Be Determined Leah Márquez PT [place] [address] [address] [facility]   2/17/2023 To Be Determined [place] [address] [address] [facility]   2/20/2023 To Be Determined Leah Márquez PT [facility]   2/20/2023 To Be Determined Joseph Schwab [place] [address] [address] [facility]   2/22/2023 To Be Determined Renell Schwab [facility] [address]   2/24/2023 To Be Determined [facility] [address]   2/27/2023 To Be Determined Leah Márquez [address] [facility] [address]   3/15/2023  1:00 PM VASCULAR, MARY ANN REAGAN BS AMB   3/15/2023  1:40 PM MD TEZ Nieto AMB   4/4/2023 10:30 AM Praful Bernabe MD RDE  BS AMB     Non-[facility] follow up appointment(s):   Urology- saw on 1/26- donnelly removed. CTN provided contact information for future needs. Plan for follow-up call in 1-2 days based on severity of symptoms and risk factors. Plan for next call:   review EMR for status of return to ED. Goals Addressed                   This Visit's Progress       Heart Failure     Patient verbalizes understanding of self-management goals of living with Congestive Heart Failure        1/26/23-  received phone call from Denny Amanda, nurse with [address]-   spoke with Ms. Ghassansean   Update-   He did not take his morning medications this morning. He took them when he got home from urology office around 10:15-10:30. VS at home:     11:15- 112/62, . She is going to check again around 1 pm.       She reports resuming lasix 20 mg on 1/23 evening (has had 3 doses so far) weight steady at 123.4.  - note- catheter in place until this morning, now removed around 9 am.   his LE - ankles and feet are swelling. Not going down much over night, slight increase as day goes on. Aide placed compression stockings on yesterday after his bath. He had a bad night last night, she reports this is second episode since he came home on 1/19/23 that he could not lie down in bed, felt SOB and chest heavy. They both did not sleep at all last night. He is going to eat lunch, drink his beverage, only drink 1-8 ounce glass of H20 by 2 pm.   I told her that he cannot drink the 8 ounces every hour at this time. Right now- she [place], then she will go home to check his BP/HR. CTN reached out to [facility] provider to update- asked about options:     Could you see in the office tomorrow, this afternoon? Could we see if [facility] can come to see him?  Possibly do IV diuretic,   Could we schedule a IV diuretic to be done tomorrow at [facility] - \"ambulatory diuretic clinic\"          Update:    VS done at 1 pm-  116/59, . He has been drinking the 8 ounces since donnelly removal around 9 am. Up till now. He urinated once- after he got home- <100 ml. They are measuring. He does not have any urinary symptoms- like he needs to urinate and cannot, belly is good, ate lunch and feels that he could walk to [place], taking his time, and not feel SOB-DONALDSON. She is supposed to let the urology office know by 2 if he has not urinated- definitely, I will ask her to call them to update them on things. They both will come to the ED if need to, but would welcome adjustments at home if an option. Return msg from [facility] team. Recommending Evaluation in ED. Review of EMR shows arrival to ED via [employer]     1/25/23- Mann Willis called, she was touching basis before she went to see Mr. Kendall, she had received my updated email. Discussion about voiding trial planned for tomorrow morning at Urology. [facility]     1/23/23- spoke with Ms. Kendall. He is weighing daily- emptying donnelly, then weighing. Initial weight after being home- 115 lbs. Yesterday and this morning- 121 lbs. [facility] nurse called [facility] clinic to advise of wt gain. RE: milrinone dosing and recently discontinued lasix 20 mg. F instructed goal weight is 120 lbs. He will take lasix 20 mg daily until goal weight achieved. Note- CTN will clarify about using PRN lasix for swelling. She reports LE ankle edema- bilateral.  Discussion about: elevating legs during the day- he sits in recline- explains must be elevated not only off the floor. Using compression stockings during the day. Doing ankle pumps, leg raises, etc to be active. Walking regularly in the home. Discussion about NA intake. Explained the relationship between high NA intake and fluid retention, leading to dehydration- increases work on heart and kidneys.  She agrees to start reading labels- stay under total of 1500 mg per day. Aim for <400 mg per intake- clarified includes food and beverages consumed. Note -she had not been reading labels, she had been  his food, later adding more seasoning to her and/or others' foods. In the setting of eating low NA-  can drink fluids to maintain hydration- discussion about hydrating vs. Non-hydrating fluids. Drink 48-64 ounces per day, half being water. Cautioned to limit-drink sparingly coffee and/or tea, for now. CTN will work with [facility], patient and family - follow up in one week.    LL[employer]

## 2023-01-26 NOTE — ED NOTES
Patient complains of fast heart rate and sob. He states he was recently discharged from the hospital for CHF. Patient is a+ox4. Skin is warm and dry. Swelling in ankles bilaterally. Respirations are even and unlabored. Emergency Department Nursing Plan of Care       The Nursing Plan of Care is developed from the Nursing assessment and Emergency Department Attending provider initial evaluation. The plan of care may be reviewed in the ED Provider note.     The Plan of Care was developed with the following considerations:   Patient / Family readiness to learn indicated by:verbalized understanding  Persons(s) to be included in education: patient  Barriers to Learning/Limitations:No    Signed     Nahum Aviles RN    1/26/2023   5:55 PM

## 2023-01-26 NOTE — ED PROVIDER NOTES
Shortness of Breath     Patient is a well-known patient with a history of ischemic cardiomyopathy and CHF. Patient presents our department after returning from a previous hospital admission last week. Patient has been having increasing lower extremity swelling and difficulty breathing. This has been progressive over the last few days. Patient does have a history of CHF. Dr. Candida Jimenez is has cardiologist.    Denies any abdominal pain. Does have intermittent palpitations. Difficulty breathing dyspnea on exertion as well as two-pillow orthopnea. His not able to ambulate given patient's difficulty breathing with exertion.   No active chest pain no fevers or chills      Past Medical History:   Diagnosis Date    CAD (coronary artery disease) 11/10/2016    NSTEMI & 2 stents    Deafness 10/28/2012    DM (diabetes mellitus) ([facility] [address]     Elevated cholesterol     Hypertension     [facility]non[facility]ST [facility] 11/10/2016       Past Surgical History:   Procedure Laterality Date    COLONOSCOPY N/A 6/28/2018    COLONOSCOPY performed by Fatou Mora MD at [facility] ENDOSCOPY    COLONOSCOPY N/A 1/18/2023    COLONOSCOPY performed by Leonie Mancuso MD at [facility] ENDOSCOPY    [address]  11/11/2016    2 stents         Family History:   Problem Relation Age of Onset    Heart Disease Father     Heart Attack Father     Hypertension Mother     Elevated Lipids Brother     Elevated Lipids Brother     No Known Problems Sister     Elevated Lipids Brother     No Known Problems Son     No Known Problems Daughter     Anesth Problems Neg Hx        Social History     Socioeconomic History    Marital status:      Spouse name: Not on file    Number of children: Not on file    Years of education: Not on file    Highest education level: Not on file   Occupational History    Not on file   Tobacco Use    Smoking status: Never     Passive exposure: Never    Smokeless tobacco: Never Vaping Use    Vaping Use: Never used   Substance and Sexual Activity    Alcohol use: Yes     Alcohol/week: 2.0 standard drinks     Types: 1 Cans of beer, 1 Shots of liquor per week     Comment: rarely    Drug use: No    Sexual activity: Yes   Other Topics Concern    Not on file   Social History Narrative    Not on file     Social Determinants of Health     Financial Resource Strain: Medium Risk    Difficulty of Paying Living Expenses: Somewhat hard   Food Insecurity: Food Insecurity Present    Worried About Running Out of Food in the Last Year: Never true    Ran Out of Food in the Last Year: Often true   Transportation Needs: Not on file   Physical Activity: Not on file   Stress: Not on file   Social Connections: Not on file   Intimate Partner Violence: Not on file   Housing Stability: Not on file         ALLERGIES: Patient has no known allergies. Review of Systems   Respiratory:  Positive for shortness of breath. Review of systems reviewed and otherwise negative except for HPI  Vitals:    01/26/23 1515 01/26/23 1645   BP: 104/60 122/66   Pulse: 98 (!) 126   Resp: 16 23   Temp: 98 °F (36.7 °C)    SpO2: 95% 98%            Physical Exam   pulse Ox Evaluation: Normal.    Physical Exam: PHYSICAL EXAMINATION  GENERAL: awake, alert and in no acute distress. A and O x 3. HEENT: Extract movements intact. NECK: Positive JVD  LUNGS: Rales in both lung fields. HEART: Tachycardia. Murmur  GASTROINTESTINAL: soft, nontender, nondistended without any rigidity, rebound or guarding. No CVA tenderness. normal bowel sounds. EXTREMITIES: 2+ bilateral pitting edema  GENITOURINARY: deferred  SKIN: Warm and dry with normal color and turgor. Normal cap refill. NEUROLOGICAL: Alert and oriented x3 cranial nerves II through XII intact 5-5 strength in follow-up lower extremities without focal nodule deficit  MUSCULOSKELETAL: Good strength and tone overall. No bony tenderness.    PSYCHIATRIC: Normal affect, insight, concentration. Medical Decision Making  Amount and/or Complexity of Data Reviewed  Labs: ordered. Radiology: ordered. ECG/medicine tests: ordered. Risk  Prescription drug management. Decision regarding hospitalization. EKG: Interpretation sinus tachycardia at a rate of 22, occasional PVCs. [place] interval 116. V1 and V2 ST elevation less than 1 mm. T wave flattening in V5 and V6. QTC of 413. Patient is a 76-year-old male with a history of ischemic cardiomyopathy and CHF presents emerged department with dyspnea on exertion and shortness of breath as well as lower extremity edema. Patient was seen in the emergency department for his shortness of breath. Differential diagnosis includes CHF exacerbation versus pneumonia versus ACS. Patient was started on Lasix. Cardiology Dr. Hussein Maciel was consulted. And patient was recommended for admission. While in the emergency department patient did have a run of SVT. Heart rate in the 180s. Patient was seen and stabilized with adenosine as well as amiodarone. Critical care time: Patient seen and evaluated in the emergency department given concern for CHF and ACS given elevated troponin. Consultation with cardiology. Was run of SVT in the emergency department. Total critical care time was spent 38 minutes. This was given patient's CHF exacerbation and concerned that his would lead to cardiovascular compromise. Time was spent with family as well as speaking to consulting services. Critical care time excludes any billed procedures.   Recent Results (from the past 24 hour(s))   EKG, 12 LEAD, INITIAL    Collection Time: 01/26/23  3:17 PM   Result Value Ref Range    Ventricular Rate 138 BPM    Atrial Rate 138 BPM    P-R Interval 140 ms    QRS Duration 72 ms    Q-T Interval 268 ms    QTC Calculation (Bezet) 406 ms    Calculated P Axis 91 degrees    Calculated R Axis 91 degrees    Calculated T Axis -74 degrees    Diagnosis       ** Suspect arm lead reversal, interpretation assumes no reversal  Sinus tachycardia  Rightward axis  Marked ST abnormality, possible inferior subendocardial injury  Abnormal ECG  When compared with ECG of 13-JAN-2023 13:25,  ST now depressed in Inferior leads  ST less depressed in Lateral leads  T wave inversion more evident in Inferior leads  T wave inversion less evident in Anterolateral leads  Confirmed by Marcus Holcomb M.D., [place] ([id number]) on 1/26/2023 5:37:59 PM     CBC WITH AUTOMATED DIFF    Collection Time: 01/26/23  4:51 PM   Result Value Ref Range    WBC 5.3 4.1 - 11.1 K/uL    RBC 3.64 (L) 4.10 - 5.70 M/uL    HGB 8.9 (L) 12.1 - 17.0 g/dL    HCT 30.9 (L) 36.6 - 50.3 %    MCV 84.9 80.0 - 99.0 FL    MCH 24.5 (L) 26.0 - 34.0 PG    MCHC 28.8 (L) 30.0 - 36.5 g/dL    RDW 23.3 (H) 11.5 - 14.5 %    PLATELET 980 755 - 635 K/uL    MPV 10.6 8.9 - 12.9 FL    NRBC 0.0 0  WBC    ABSOLUTE NRBC 0.00 0.00 - 0.01 K/uL    NEUTROPHILS 53 32 - 75 %    LYMPHOCYTES 32 12 - 49 %    MONOCYTES 11 5 - 13 %    EOSINOPHILS 2 0 - 7 %    BASOPHILS 1 0 - 1 %    IMMATURE GRANULOCYTES 1 (H) 0.0 - 0.5 %    ABS. NEUTROPHILS 2.7 1.8 - 8.0 K/UL    ABS. LYMPHOCYTES 1.7 0.8 - 3.5 K/UL    ABS. MONOCYTES 0.6 0.0 - 1.0 K/UL    ABS. EOSINOPHILS 0.1 0.0 - 0.4 K/UL    ABS. BASOPHILS 0.1 0.0 - 0.1 K/UL    ABS. IMM.  GRANS. 0.1 (H) 0.00 - 0.04 K/UL    DF SMEAR SCANNED      RBC COMMENTS ANISOCYTOSIS  2+        RBC COMMENTS HYPOCHROMIA  1+        RBC COMMENTS OVALOCYTES  1+       TROPONIN-HIGH SENSITIVITY    Collection Time: 01/26/23  4:51 PM   Result Value Ref Range    Troponin-High Sensitivity 2,033 (HH) 0 - 76 ng/L   METABOLIC PANEL, COMPREHENSIVE    Collection Time: 01/26/23  4:51 PM   Result Value Ref Range    Sodium 136 136 - 145 mmol/L    Potassium 4.6 3.5 - 5.1 mmol/L    Chloride 106 97 - 108 mmol/L    CO2 21 21 - 32 mmol/L    Anion gap 9 5 - 15 mmol/L    Glucose 198 (H) 65 - 100 mg/dL    BUN 19 6 - 20 MG/DL    Creatinine 1.36 (H) 0.70 - 1.30 MG/DL    BUN/Creatinine ratio 14 12 - 20      eGFR 56 (L) >60 ml/min/1.73m2    Calcium 9.3 8.5 - 10.1 MG/DL    Bilirubin, total 0.4 0.2 - 1.0 MG/DL    ALT (SGPT) 27 12 - 78 U/L    AST (SGOT) 20 15 - 37 U/L    Alk. phosphatase 128 (H) 45 - 117 U/L    Protein, total 7.0 6.4 - 8.2 g/dL    Albumin 3.2 (L) 3.5 - 5.0 g/dL    Globulin 3.8 2.0 - 4.0 g/dL    A-G Ratio 0.8 (L) 1.1 - 2.2     SAMPLES BEING HELD    Collection Time: 01/26/23  4:51 PM   Result Value Ref Range    SAMPLES BEING HELD 1RED,1BLU     COMMENT        Add-on orders for these samples will be processed based on acceptable specimen integrity and analyte stability, which may vary by analyte. NT-PRO BNP    Collection Time: 01/26/23  4:51 PM   Result Value Ref Range    NT pro-BNP 4,524 (H) <125 PG/ML   MAGNESIUM    Collection Time: 01/26/23  8:38 PM   Result Value Ref Range    Magnesium 1.5 (L) 1.6 - 2.4 mg/dL   PHOSPHORUS    Collection Time: 01/26/23  8:38 PM   Result Value Ref Range    Phosphorus 2.6 2.6 - 4.7 MG/DL   SAMPLES BEING HELD    Collection Time: 01/26/23  8:38 PM   Result Value Ref Range    SAMPLES BEING HELD 1RED, LAV     COMMENT        Add-on orders for these samples will be processed based on acceptable specimen integrity and analyte stability, which may vary by analyte.    RESPIRATORY VIRUS PANEL W/COVID-19, PCR    Collection Time: 01/26/23  9:19 PM    Specimen: Nasopharyngeal   Result Value Ref Range    Adenovirus Not detected NOTD      Coronavirus 229E Not detected NOTD      Coronavirus HKU1 Not detected NOTD      Coronavirus CVNL63 Not detected NOTD      Coronavirus OC43 Not detected NOTD      SARS-CoV-2, PCR Not detected NOTD      Metapneumovirus Not detected NOTD      Rhinovirus and Enterovirus Not detected NOTD      Influenza A Not detected NOTD      Influenza B Not detected NOTD      Parainfluenza 1 Not detected NOTD      Parainfluenza 2 Not detected NOTD      Parainfluenza 3 Not detected NOTD      Parainfluenza virus 4 Not detected NOTD      RSV by PCR Not detected NOTD      B. parapertussis, PCR Not detected NOTD      Bordetella pertussis - PCR Not detected NOTD      Chlamydophila pneumoniae DNA, QL, PCR Not detected NOTD      Mycoplasma pneumoniae DNA, QL, PCR Not detected NOTD     METABOLIC PANEL, COMPREHENSIVE    Collection Time: 01/27/23 12:52 AM   Result Value Ref Range    Sodium 137 136 - 145 mmol/L    Potassium 3.9 3.5 - 5.1 mmol/L    Chloride 105 97 - 108 mmol/L    CO2 23 21 - 32 mmol/L    Anion gap 9 5 - 15 mmol/L    Glucose 237 (H) 65 - 100 mg/dL    BUN 16 6 - 20 MG/DL    Creatinine 1.15 0.70 - 1.30 MG/DL    BUN/Creatinine ratio 14 12 - 20      eGFR >60 >60 ml/min/1.73m2    Calcium 8.9 8.5 - 10.1 MG/DL    Bilirubin, total 0.5 0.2 - 1.0 MG/DL    ALT (SGPT) 23 12 - 78 U/L    AST (SGOT) 17 15 - 37 U/L    Alk. phosphatase 121 (H) 45 - 117 U/L    Protein, total 6.6 6.4 - 8.2 g/dL    Albumin 3.0 (L) 3.5 - 5.0 g/dL    Globulin 3.6 2.0 - 4.0 g/dL    A-G Ratio 0.8 (L) 1.1 - 2.2     CBC W/O DIFF    Collection Time: 01/27/23 12:52 AM   Result Value Ref Range    WBC 6.7 4.1 - 11.1 K/uL    RBC 3.59 (L) 4.10 - 5.70 M/uL    HGB 9.2 (L) 12.1 - 17.0 g/dL    HCT 29.9 (L) 36.6 - 50.3 %    MCV 83.3 80.0 - 99.0 FL    MCH 25.6 (L) 26.0 - 34.0 PG    MCHC 30.8 30.0 - 36.5 g/dL    RDW 23.2 (H) 11.5 - 14.5 %    PLATELET 565 250 - 431 K/uL    MPV 10.7 8.9 - 12.9 FL    NRBC 0.0 0  WBC    ABSOLUTE NRBC 0.00 0.00 - 0.01 K/uL       XR CHEST PA LAT    Result Date: 1/26/2023  Indication:  sob Exam: PA and lateral views of the chest. Direct comparison is made to prior CXR dated January 1, 2023. Direct comparison is also made to prior CT dated January 16, 2023. Findings: Cardiomediastinal silhouette is stable. Median sternotomy wires are noted. Right-sided PICC line extends to the mid SVC. There are new diffuse bilateral increased interstitial markings which are partially obscuring bilateral pulmonary nodules. There is no pleural fluid. There is no pneumothorax.      New diffuse bilateral increased interstitial markings, partially obscuring bilateral pulmonary nodules. CT can be performed for further evaluation, as indicated. Perfect Serve Consult for Admission  5:06 PM    ED Room Number: ER24/24  Patient Name and age:  Artem Kendall 71 y.o.  male  Working Diagnosis:   1.  Acute on chronic combined systolic and diastolic congestive heart failure ([facility] [address]        COVID-19 Suspicion:  no  Sepsis present:  no  Reassessment needed: no  Code Status:  Full Code  Readmission: no  Isolation Requirements:  no  Recommended Level of Care:  tele  Department:[facility] Adult ED - 21   Other:  Dr. Bart Brown was consulted

## 2023-01-26 NOTE — TELEPHONE ENCOUNTER
Received a call from North, [facility] nurse, stating that patient weight today is 123.4 lb, BP is 94/55, . He is short of breath, and has heaviness in his chest when trying to lay down. He just came from urologist, where they removed donnelly catheter and instructed him to drink 8 ounces of water every hour. I advised North to have patient present to ED for his symptoms for further evaluation. She is in agreement, patient will come to [facility] now. Received a message from Nurse Navigator YADI Tolliver, stating patient did not go to ED but continues with  and is short of breath, has not slept past few nights, is having chest heaviness. I called patient's wife, reviewed symptoms with her and advised that patient present to ED. She is in agreement and will take him now.

## 2023-01-27 ENCOUNTER — HOME CARE VISIT (OUTPATIENT)
Dept: HOME HEALTH SERVICES | Facility: HOME HEALTH | Age: 73
End: 2023-01-27
Payer: MEDICARE

## 2023-01-27 ENCOUNTER — DOCUMENTATION ONLY (OUTPATIENT)
Dept: CARDIOLOGY CLINIC | Age: 73
End: 2023-01-27

## 2023-01-27 NOTE — PROGRESS NOTES
Critical Care Documentation    Name: Harsha Cedeno  YOB: 1950  MRN: [id number]  Admission Date: 1/26/2023  4:17 PM    Date of service: 1/26/2023    Active Diagnoses:    Hospital Problems  Date Reviewed: 1/24/2023            Codes Class Noted POA    [facility] ICD-10-CM: I50.9  ICD-9-CM: 428.0  12/12/2022 Unknown           Critical Illness Complaint:  SVT    Clinical Presentation:  Patient with CHF presenting to the hospital for concerns of orthopnea, PND, lower extremity edema and tachycardia. Noted to be in SVT narrow complex with heart rate 190s    Physical Exam:   General:  Alert, patient in mild acute distress   Head:  Normocephalic, without obvious abnormality, atraumatic. Eyes:  Conjunctivae/corneas clear. Throat: Lips, mucosa, and tongue normal.   Neck: Supple, symmetrical, trachea midline. no enlargement/tenderness/nodules, no carotid bruit and no JVD. Lungs:   Clear to auscultation bilaterally. LifeVest on   Heart:  Extreme tacky   Abdomen:   Soft, non-tender. Bowel sounds normal. No masses,  No organomegaly. Extremities: Extremities normal, atraumatic, no cyanosis or edema. Pulses: 2+ and symmetric all extremities. Skin: Skin color, texture, turgor normal. No rashes or lesions           Data Reviewed: All diagnostic labs and studies have been reviewed.       Medications/Therapies Administered:   Anxiolytics: [  ] yes [  x] no   Antiarrhythmics: [  x] yes [  ] no   Antihypertensives: [  ] yes [  x] no   IVFs: [  ] yes [  x] no   Blood Transfusion: [  ] yes [ x ] no    Imaging Ordered and Reviewed:   [  ] CXR    [  ] CT Scan    [  ] MRI    [  ] Ultrasound    Prognosis / Plan of Care Discussed With:  [  x] Patient  [ x ] Family Members / Surrogate Decision-makers (patient unable / incompetent to give history and/or make treatment decisions, and such discussion is medically necessary)  [ x ] Nursing Staff  [  x] Specialist Physicians        Assessment and Plan:  Raghu Shah is a 71 y.o. male with history of ischemic cardiomyopathy, CAD status post PCI x2 HFrEF EF 25-30 stage D, NYHA class IIIb recently placed on milrinone drip as bridge to LVAD therapy, type 2 diabetes, hypertension, dyslipidemia, BPH, dyslipidemia, TRISTAN, CKD stage III who is noted to be in SVT with heart rate 180s  -Treated with adenosine 6 mg and 12 mg without conversion  -Initiated amnio 150 bolus with improvement in heart rate  -Maintain amnio gtt. Critical Care Attestation: This patient is unstable and critically ill. Due to a high probability of clinically significant, life threatening deterioration, the patient required my highest level of preparedness to intervene emergently and I personally spent this critical care time directly and personally managing the patient, and was immediately available to the patient. This critical care time included obtaining a history; examining the patient; pulse oximetry; ordering and review of labs/studies; arranging urgent treatment with development of a management plan; evaluation of patient's response to treatment; frequent reassessment; and, discussions with other providers and/or family. This critical care time was performed to assess and manage the high probability of imminent, life-threatening deterioration that could result in multi-organ failure and death. Time Spent:     I personally spent 60 minutes in providing critical care time. It was exclusive of separately billable procedures, treating other patients, and teaching time.     Kaykay Bartlett MD  1/26/2023  9:30 PM

## 2023-01-27 NOTE — PROGRESS NOTES
1532: TRANSFER - IN REPORT:    Verbal report received from [facility], [address] (name) on Leandra Carver  being received from ED(unit) for routine progression of care      Report consisted of patients Situation, Background, Assessment and   Recommendations(SBAR). Information from the following report(s) SBAR, Kardex, Intake/Output, MAR, Recent Results, and Cardiac Rhythm Sinus Tach  was reviewed with the receiving nurse. Opportunity for questions and clarification was provided. Assessment completed upon patients arrival to unit and care assumed. [id number] Ortiz MD updated about patients critical trop of 3141 and patient HR sustaining in 120s. No new orders at this time. 1656Suphilip Ortiz MD updated about patients HR now sustaining in 130s after getting up to bedside commode. Patient states he can feel his HR but has no chest pain. Per Violette Morgan MD page Marjorie Baker MD and give oral amio now. 1705: Marjorie Baker paged at this time. [phone]: Per Violette Morgan MD she spoke with Marjorie Baker MD and no further orders are needed for patients HR sustaining in 130s to low 140s.    1800: Violette Morgan MD called back and asked RN to follow up with [facility] about possibly decreasing milrinone dose. 1810: [facility] paged at this time. 1811: Justen Rodríguez NP with advanced HF updated about patient condition. Per Moustapha Jolly NP she will take a look at the patients chart and place orders if needed. 1910Marilynn Álvarez MD updated at this time about patients HR in the 130-140s. Marjorie Baker MD to place orders if needed. 1930: Bedside and Verbal shift change report given to Dean Alegre RN (oncoming nurse) by Star Hansen RN (offgoing nurse). Report included the following information SBAR, Kardex, Intake/Output, MAR, Recent Results, and Cardiac Rhythm Sinus Tach . 2015: Justen Rodríguez NP called RN back and stated to keep milrinone at current dose. Moustapha Jolly NP also updated about temp of 99.1. No new orders received. Dean Alegre RN updated about conversation.      Care Plans:   Problem: Diabetes Self-Management  Goal: *Disease process and treatment process  Description: Define diabetes and identify own type of diabetes; list 3 options for treating diabetes. Outcome: Progressing Towards Goal  Goal: *Incorporating nutritional management into lifestyle  Description: Describe effect of type, amount and timing of food on blood glucose; list 3 methods for planning meals. Outcome: Progressing Towards Goal  Goal: *Incorporating physical activity into lifestyle  Description: State effect of exercise on blood glucose levels. Outcome: Progressing Towards Goal  Goal: *Developing strategies to promote health/change behavior  Description: Define the ABC's of diabetes; identify appropriate screenings, schedule and personal plan for screenings. Outcome: Progressing Towards Goal  Goal: *Using medications safely  Description: State effect of diabetes medications on diabetes; name diabetes medication taking, action and side effects. Outcome: Progressing Towards Goal  Goal: *Monitoring blood glucose, interpreting and using results  Description: Identify recommended blood glucose targets  and personal targets. Outcome: Progressing Towards Goal  Goal: *Prevention, detection, treatment of acute complications  Description: List symptoms of hyper- and hypoglycemia; describe how to treat low blood sugar and actions for lowering  high blood glucose level. Outcome: Progressing Towards Goal  Goal: *Prevention, detection and treatment of chronic complications  Description: Define the natural course of diabetes and describe the relationship of blood glucose levels to long term complications of diabetes.   Outcome: Progressing Towards Goal  Goal: *Developing strategies to address psychosocial issues  Description: Describe feelings about living with diabetes; identify support needed and support network  Outcome: Progressing Towards Goal  Goal: *Insulin pump training  Outcome: Progressing Towards Goal  Goal: *Sick day guidelines  Outcome: Progressing Towards Goal  Goal: *Patient Specific Goal (EDIT GOAL, INSERT TEXT)  Outcome: Progressing Towards Goal     Problem: Patient Education: Go to Patient Education Activity  Goal: Patient/Family Education  Outcome: Progressing Towards Goal     Problem: Pressure Injury - Risk of  Goal: *Prevention of pressure injury  Description: Document Mike Scale and appropriate interventions in the flowsheet. Outcome: Progressing Towards Goal  Note: Pressure Injury Interventions:  Sensory Interventions: Assess need for specialty bed, Minimize linen layers, Pad between skin to skin    Moisture Interventions: Absorbent underpads, Apply protective barrier, creams and emollients    Activity Interventions: PT/OT evaluation, Increase time out of bed    Mobility Interventions: HOB 30 degrees or less, PT/OT evaluation    Nutrition Interventions: Document food/fluid/supplement intake, Discuss nutritional consult with provider    Friction and Shear Interventions: HOB 30 degrees or less                Problem: Patient Education: Go to Patient Education Activity  Goal: Patient/Family Education  Outcome: Progressing Towards Goal     Problem: Falls - Risk of  Goal: *Absence of Falls  Description: Document Laverne Fall Risk and appropriate interventions in the flowsheet.   Outcome: Progressing Towards Goal  Note: Fall Risk Interventions:                                Problem: Patient Education: Go to Patient Education Activity  Goal: Patient/Family Education  Outcome: Progressing Towards Goal     Problem: Pain  Goal: *Control of Pain  Outcome: Progressing Towards Goal  Goal: *PALLIATIVE CARE:  Alleviation of Pain  Outcome: Progressing Towards Goal     Problem: Patient Education: Go to Patient Education Activity  Goal: Patient/Family Education  Outcome: Progressing Towards Goal

## 2023-01-27 NOTE — PROGRESS NOTES
Occupational Therapy: hold    Chart reviewed. Note patient is in ED for acute on chronic advanced heart failure (LVAD work-up in process). Note patient is at risk of sudden cardiac death per heart failure team.  Attempted OT evaluation but patient's HR is 120s at rest.  Will hold OT at this time and will follow-up as able and appropriate.     Lachelle Fletcher, OTR/L

## 2023-01-27 NOTE — DISCHARGE INSTRUCTIONS
Download the Heart Failure [facility] Shannon: Search in your [employer] (Android) or [employer] Store (coconephone): Search for- HF [facility]ner Shannon.    [email or web address]    HF Giltner is a brand-new phone shannon that helps you track daily symptoms, vitals, mood, energy level and more. You can even add your heart failure care team members to view your data and monitor your condition at home.     HF G[place]ner lets you:  Track symptoms, medications and more  Share health information with your health care team  Connect with others living with heart failure

## 2023-01-27 NOTE — PROGRESS NOTES
Physical Therapy: Hold    Chart reviewed. Note patient is in ED for acute on chronic advanced heart failure (LVAD work-up in process). Note patient is at risk of sudden cardiac death per heart failure team.  Discussed with OT who attempted evaluation but patient's HR was 120s at rest.  Will hold PT at this time and will follow-up as able and appropriate.     Myesha Bay, PT, DPT

## 2023-01-27 NOTE — PROGRESS NOTES
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, LVEF 25-30%, stage D, NYHA class IIIB/IV; initiated on home milrinone gtt as bridge to completion of LVAD workup  Most likely etiology of cardiomyopathy: CAD +/- TRISTAN +/- inappropriate sinus tach +/- undergoing workup  Patient presents with symptomatic SVT with RVR currently on amiodarone gtt with ST  Plan to continue amiodarone and beta-blockers, EP consultation, complete remainder of LVAD workup while inpatient and discuss at [facility] today re: concern about intolerance of inotropic support       RECOMMENDATIONS:  Continue milrinone  0.2mcg/kg/min unable to uptitrate due to HR and BP  Start toprol XL 12.5mg twice daily  Cannot tolerate ARB/ARNi due to hypotension  Of note Flomax was held and BP responded well   Cannot tolerate spironolactone due to hyperkalemia  Cannot tolerate jardiance 10mg daily due to significant diuresis on smallest dose/contributed to IVVD  Discontinue corlanor due to SVT  Continue reduced dose of digoxin 0.125mcg QOD, check digoxin level (today 0.9 goal 0.7-1.2)  Continue amiodarone gtt, consult EP cardiology  Continue to hold diuretics; check orthostatics  Give albumin x once today  Add to labs: procalcitonin, prealbumin, troponin  Repeat troponins and EKG for ST depressions thoughout  Continue lifevest ordered  Continue baby aspirin   Plavix previously stopped, okayed by Dr. Chino Márquez consult pending (high risk for TRISTAN)  VAD evaluation in progress, will need PFT/DLCO, maxillary CT with contrast, urology consult, sleep medicine consult  CAV cardiology consultation All other care per primary team, hopefully home end of the week     IMPRESSION:  Acute on chronic combined systolic/diastolic  heart failure  Stage D, NYHA class IIIB/IV symptoms   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, LVEF 25-30% and 23% (by cMRI 1/3/23)  Cardiac MRI suggestive of ischemic cardiomyopathy  PYP equivocal  Chronic milrinone infusion as palliation   Coronary artery disease  CAD s/p CABG x 2: further disease best managed medically due to small vessel size   At risk of sudden cardiac death  Peripheral arterial disease  Bilateral hydronephrosis s/p donnelly  Cardiac risk factors:  HTN  HL  TRISTAN, STOP-BANG 4  DM2  CKD, stage 3  MOCA from 1/4/22 17/30, consistent with mild cognitive impairment  Hard of hearing  Full code      INTERVAL EVENTS:  No issues overnight  Feels better   Afebrile  SBP 110s, -110s  I/O inaccurate  Weight 120lbs   Hgb 9.2 from 8.9  Cr 1.15  K 3.9  Mg 1.5  Alb 3.0  T Bili 0.5  NT 2452  Troponin 2033     LIFE GOALS:  Lifestyle goals reviewed with the patient. Patient's personal goals include: TBD  Important upcoming milestones or family events: TBD  The patient identifies the following as important for living well: TBD     Patient assessed. High suspicion of sleep apnea due to the following reasons. Will refer for sleep evaluation. [x] Heart Failure  [] Hypertention  [x] Atrial Fibrillation  [] BMI > 30  [] History of stroke  [] Diabetes  [x] Heavy snoring  [] Witnessed apnea  [] Hypoxemia                    HPI:  71 y.o. male who was admitted via ED for worsening SOB, poor appetite, orthopnea and fatigue. Pmhx of CAD s/p CABG, PAD, DM 2, recent admission for HFmrEF earlier this month. Serial TTEs show progressive decline in EF since 3/2021 with the most recent EF at 25-30%. Recent LHC shows diffuse CAD and no interventions indicated. Patient had Pickett Sans recently and there is some thought to myocarditis or other etiology causing worsening HF.  [facility] was consulted for further evaluation and management of HFrEF. CARDIAC IMAGING:  Echo 1/9/23   Left Ventricle: Severely reduced left ventricular systolic function with a visually estimated EF of 25 - 30%. Left ventricle size is normal. Mildly increased wall thickness. Echo 12/26/22    Left Ventricle: Severely reduced left ventricular systolic function with a visually estimated EF of 25 - 30%. Left ventricle size is normal. Normal wall thickness. There are regional wall motion abnormalities. Grade II diastolic dysfunction with increased LAP. Right Ventricle: Moderately reduced systolic function. TAPSE is abnormal. TAPSE is 1.1 cm. Aortic Valve: Mild stenosis of the aortic valve. AV peak gradient is 13 mmHg. AV peak velocity is 1.8 m/s. Mitral Valve: Not well visualized. Moderate annular calcification at the posterior leaflet of the mitral valve. Mild to moderate regurgitation. Tricuspid Valve: Mildly elevated RVSP. Left Atrium: Left atrium is moderately dilated. 12/8/22    Left Ventricle: Moderately reduced left ventricular systolic function with a visually estimated EF of 35 - 40%. Severe hypokinesis of the following segments: mid anteroseptal, apical anterior, apical septal, apical inferior and apical lateral. Severe hypokinesis of the apex. Mitral Valve: Severely thickened leaflet, at the anterior and posterior leaflets. Severely calcified leaflet, at the anterior and posterior leaflets. Mild annular calcification of the mitral valve. Moderate regurgitation. Left Atrium: Left atrium is mildly dilated. Contrast used: Definity. limited study     EKG 12/22/22 ST, Biatria enlargement, marked ST abnormality     C 12/6/22  1. Normal LVEDP  2. Severe native multivessel coronary artery disease  3. Patent LIMA to LAD and vein graft to distal RCA  4. Recurrent ISR in OM1 stent with now 60 to 70% restenosis  5.   Recoil of left main and circumflex stent with now recurrent 40 to 50% stenosis. 6.  Progression of ostial left main disease now to about 60% stenosis  7. Progression of disease in jailed first marginal branch now with diffuse 90% stenosis  8. High-grade stenosis in the mid to distal right potential femoral artery treated with 6 x 40 mm impact drug-coated balloon angioplasty to reduce the stenosis to less than 40%     NST        HEMODYNAMICS:  [facility] 1/9/23  PA 20/9, RA 3, PCWP 8, CI 1.8     CPEST too ill   6MW 300 feet       PHYSICAL EXAM:  Visit Vitals  BP (!) 119/5 (BP 1 Location: Left upper arm, BP Patient Position: At rest)   Pulse (!) 112   Temp 98.1 °F (36.7 °C)   Resp 22   Ht 5' 9\" (1.753 m)   Wt 120 lb (54.4 kg)   SpO2 95%   BMI 17.72 kg/m²     General: Patient is frail, cachectic in no acute distress  HEENT: Unremarkable   Neck: Supple. No evidence of thyroid enlargements or lymphadenopathy. JVD: Cannot be appreciated   Lungs: Breath sounds are equal and clear bilaterally. No wheezes, rhonchi, or rales. Heart: tachycardia with normal S1 and S2. No murmurs, gallops or rubs. Abdomen: Soft, no mass or tenderness. No organomegaly or hernia. Bowel sounds present. Genitourinary and rectal: deferred  Extremities: No cyanosis, clubbing, or edema. Neurologic: No focal sensory or motor deficits are noted. Grossly intact. Psychiatric: Awake, alert an doriented x 3. Appropriate mood and affect. Skin: Warm, dry and well perfused. REVIEW OF SYSTEMS:  General: Denies fever. Ear, nose and throat: Denies difficulty hearing, sinus problems, nosebleeds  Cardiovascular: see above in the interval history  Respiratory: Denies cough, wheezing, sputum production, hemoptysis.   Gastrointestinal: Denies heartburn, constipation, diarrhea, abdominal pain, nausea, blood in stool  Kidney and bladder: Denies painful urination, frequent urination  Musculoskeletal: Denies joint pain, muscle weakness  Skin and hair: Denies change in existing skin lesions    PAST MEDICAL HISTORY:  Past Medical History:   Diagnosis Date    CAD (coronary artery disease) 11/10/2016    NSTEMI & 2 stents    Deafness 10/28/2012    DM (diabetes mellitus) ([facility] [address]     Elevated cholesterol     Hypertension     NSTEMI (non-ST elevated myocardial infarction) ([facility] [address] 11/10/2016       PAST SURGICAL HISTORY:  Past Surgical History:   Procedure Laterality Date    COLONOSCOPY N/A 6/28/2018    COLONOSCOPY performed by Jamie Jennings MD at [facility]COPY    COLONOSCOPY N/A 1/18/2023    COLONOSCOPY performed by Gagan Morales MD at [facility] ENDOSCOPY    [address]  11/11/2016    2 stents       FAMILY HISTORY:  Family History   Problem Relation Age of Onset    Heart Disease Father     Heart Attack Father     Hypertension Mother     Elevated Lipids Brother     Elevated Lipids Brother     No Known Problems Sister     Elevated Lipids Brother     No Known Problems Son     No Known Problems Daughter     Anesth Problems Neg Hx        SOCIAL HISTORY:  Social History     Socioeconomic History    Marital status:    Tobacco Use    Smoking status: Never     Passive exposure: Never    Smokeless tobacco: Never   Vaping Use    Vaping Use: Never used   Substance and Sexual Activity    Alcohol use:  Yes     Alcohol/week: 2.0 standard drinks     Types: 1 Cans of beer, 1 Shots of liquor per week     Comment: rarely    Drug use: No    Sexual activity: Yes     Social Determinants of Health     Financial Resource Strain: Medium Risk    Difficulty of Paying Living Expenses: Somewhat hard   Food Insecurity: Food Insecurity Present    Worried About Running Out of Food in the Last Year: Never true    Ran Out of Food in the Last Year: Often true       LABORATORY RESULTS:     Labs Latest Ref Rng & Units 1/27/2023 1/26/2023 1/25/2023 1/19/2023 1/18/2023 1/17/2023 1/16/2023   WBC 4.1 - 11.1 K/uL 6.7 5.3 4.8 5.4 4.8 5.8 -   RBC 4.10 - 5.70 M/uL 3.59(L) 3.64(L) 3.69(L) 3.28(L) 3.47(L) 3.80(L) -   Hemoglobin 12.1 - 17.0 g/dL 9.2(L) 8.9(L) 9.2(L) 8. 1(L) 8. 3(L) 9.2(L) -   Hematocrit 36.6 - 50.3 % 29. 9(L) 30. 9(L) 30. 8(L) 27. 4(L) 29. 3(L) 31. 4(L) -   MCV 80.0 - 99.0 FL 83.3 84.9 84 83.5 84.4 82.6 -   Platelets 824 - 667 K/uL 229 238 218 205 227 238 -   Lymphocytes 12 - 49 % - 32 - - 38 - -   Monocytes 5 - 13 % - 11 11 - 9 - -   Eosinophils 0 - 7 % - 2 3 - 4 - -   Basophils 0 - 1 % - 1 1 - 1 - -   Albumin 3.5 - 5.0 g/dL 3. 0(L) 3. 2(L) 3.8 2. 9(L) 2. 9(L) 3.4(L) 2. 9(L)   Calcium 8.5 - 10.1 MG/DL 8.9 9.3 9.3 9.3 9.3 9.7 9.0   Glucose 65 - 100 mg/dL 237(H) 198(H) 206(H) 266(H) 103(H) 168(H) 192(H)   BUN 6 - 20 MG/DL 16 19 20 14 16 26(H) 25(H)   Creatinine 0.70 - 1.30 MG/DL 1.15 1.36(H) 1.14 1.03 1.11 1.48(H) 1.32(H)   Sodium 136 - 145 mmol/L 137 136 137 136 140 136 134(L)   Potassium 3.5 - 5.1 mmol/L 3.9 4.6 4.7 5.0 4.5 5.3(H) 5.0   TSH 0.36 - 3.74 uIU/mL - - - - - - -   PSA 0.01 - 4.0 ng/mL - - - - - 2.2 -   LDH 85 - 241 U/L - - - - - - -   Some recent data might be hidden     Lab Results   Component Value Date/Time    TSH 2.12 12/27/2022 02:36 PM    TSH 4.80 (H) 12/06/2022 03:53 AM    TSH 5.39 (H) 10/12/2022 09:10 AM    TSH 3.53 02/03/2022 11:47 AM    TSH 5.790 (H) 11/21/2019 04:45 PM    TSH 3.08 06/22/2018 01:53 PM    TSH 4.250 05/26/2015 09:43 AM       ALLERGY:  No Known Allergies     CURRENT MEDICATIONS:    Current Facility-Administered Medications:     aspirin delayed-release tablet 81 mg, 81 mg, Oral, DAILY, Heber Barajas MD    sodium chloride (NS) flush 5-40 mL, 5-40 mL, IntraVENous, Q8H, Heber Barajas MD, 10 mL at 01/26/23 2220    sodium chloride (NS) flush 5-40 mL, 5-40 mL, IntraVENous, PRN, Heber Barajas MD    acetaminophen (TYLENOL) tablet 650 mg, 650 mg, Oral, Q6H PRN **OR** acetaminophen (TYLENOL) suppository 650 mg, 650 mg, Rectal, Q6H PRN, Heber Barajas MD    polyethylene glycol (MIRALAX) packet 17 g, 17 g, Oral, DAILY PRN, Heber Barajas MD    ondansetron (ZOFRAN ODT) tablet 4 mg, 4 mg, Oral, Q8H PRN **OR** ondansetron (ZOFRAN) injection 4 mg, 4 mg, IntraVENous, Q6H PRN, Heber Barajas MD    furosemide (LASIX) injection 40 mg, 40 mg, IntraVENous, BID, Heber Barajas MD, 40 mg at 01/26/23 2219    digoxin (LANOXIN) tablet 0.125 mg, 0.125 mg, Oral, EVERY OTHER DAY, Heber Barajas MD, 0.125 mg at 01/26/23 2219    heparin (porcine) pf 500 Units, 500 Units, InterCATHeter, PRN, Heber Barajas MD    ivabradine (CORLANOR) tablet 2.5 mg, 2.5 mg, Oral, BID WITH MEALS, Heber Barajas MD    pantoprazole (PROTONIX) tablet 40 mg, 40 mg, Oral, ACB, Heber Barajas MD    rosuvastatin (CRESTOR) tablet 20 mg, 20 mg, Oral, QHS, Heber Barajas MD, 20 mg at 01/26/23 2219    zolpidem (AMBIEN) tablet 5 mg, 5 mg, Oral, QHS PRN, Heber Barajas MD    milrinone (PRIMACOR) 20 MG/100 ML D5W infusion, 0.2 mcg/kg/min, IntraVENous, CONTINUOUS, Heber Barajas MD, Last Rate: 3.3 mL/hr at 01/26/23 2056, 0.2 mcg/kg/min at 01/26/23 2056    heparin (porcine) pf 500 Units, 500 Units, InterCATHeter, DAILY, Heber Barajas MD    amiodarone (CORDARONE) 375 mg/250 mL D5W infusion, 0.5-1 mg/min, IntraVENous, TITRATE, Ita Harvey NP, Last Rate: 20 mL/hr at 01/27/23 0415, 0.5 mg/min at 01/27/23 0415    Current Outpatient Medications:     zolpidem (AMBIEN) 5 mg tablet, Take 1 Tablet by mouth nightly as needed for Sleep. Max Daily Amount: 5 mg., Disp: 30 Tablet, Rfl: 0    furosemide (LASIX) 20 mg tablet, Take 1 Tablet by mouth daily as needed (for weight greater than 120 lb by 2-3 lb or shortness of breath). , Disp: 30 Tablet, Rfl: 0    milrinone (PRIMACOR) 20 mg/100 mL infusion, 0.2 mcg/kg/min by IntraVENous route continuous. infuse at 0.2mcg/kg/min with dosage weight of 52kgs continuously, Disp: , Rfl:     sodium chloride (NS), 10 mL by IntraVENous route daily. flush open port on PICC line with 10mL NS daily and prn for lab draws. , Disp: , Rfl:     heparin sod,porcine/0.9 % NaCl (HEPARIN FLUSH IV), 5 mL by IntraVENous route daily. flush open port on PICC line with 5mL daily and prn after blood draws. , Disp: , Rfl:     milrinone lactate/D5W (MILRINONE IN D5W IV), 34 mg by IntraVENous route every fourty-eight (48) hours. 0.2mcg/kg/min 0.2mg/ml 3.1ml/hr, Disp: , Rfl:     digoxin (LANOXIN) 0.125 mg tablet, Take 1 Tablet by mouth every other day for 30 days. , Disp: 15 Tablet, Rfl: 0    finasteride (PROSCAR) 5 mg tablet, Take 1 Tablet by mouth daily (with dinner) for 30 days. , Disp: 30 Tablet, Rfl: 0    pantoprazole (PROTONIX) 40 mg tablet, Take 1 Tablet by mouth Daily (before breakfast) for 30 days. , Disp: 30 Tablet, Rfl: 0    metFORMIN (GLUCOPHAGE) 500 mg tablet, Take 1 Tablet by mouth two (2) times daily (with meals) for 30 days. , Disp: 60 Tablet, Rfl: 0    flash glucose sensor (FreeStyle Carolynn 2 Sensor) kit, 1 Kit by Does Not Apply route every fourteen (14) days. , Disp: 1 Kit, Rfl: 1    lancets misc, Check blood sugar before meals, bed and as needed. , Disp: 100 Each, Rfl: 1    ivabradine (CORLANOR) 5 mg tablet, Take 0.5 Tablets by mouth two (2) times daily (with meals). , Disp: 30 Tablet, Rfl: 11    glipiZIDE (GLUCOTROL) 5 mg tablet, Take 1 tablet by mouth twice daily, Disp: 180 Tablet, Rfl: 1    ipratropium (ATROVENT) 21 mcg (0.03 %) nasal spray, 2 Sprays by Both Nostrils route every twelve (12) hours. , Disp: 30 mL, Rfl: 0    nitroglycerin (NITROSTAT) 0.4 mg SL tablet, 1 Tablet by SubLINGual route every five (5) minutes as needed for Chest Pain. Up to 3 doses. , Disp: 25 Tablet, Rfl: 3    ergocalciferol (ERGOCALCIFEROL) 1,250 mcg (50,000 unit) capsule, Take 1 Capsule by mouth every seven (7) days. , Disp: 12 Capsule, Rfl: 0    Januvia 50 mg tablet, Take 1 tablet by mouth once daily, Disp: 90 Tablet, Rfl: 0    polyethylene glycol (MIRALAX) 17 gram/dose powder, Take 17 g by mouth daily.  (Patient taking differently: Take 17 g by mouth daily as needed for Constipation.), Disp: 510 g, Rfl: 0    rosuvastatin (CRESTOR) 20 mg tablet, Take 1 Tablet by mouth nightly., Disp: 90 Tablet, Rfl: 3    aspirin delayed-release 81 mg tablet, Take 1 Tab by mouth., Disp: , Rfl:     PATIENT CARE TEAM:  Patient Care Team:  Tarun Kong MD as PCP - General (Family Medicine)  Tarun Kong MD as PCP - [facility]  Ana Rosa Ng MD (Cardiovascular Disease Physician)  Suresh Thomson MD (Gastroenterology)  Levar Braga MD (Cardiothoracic Surgery)  Ramila Ballard MD (Cardiovascular Disease Physician)  Jaimie Hardy MD (Nephrology)  Sirena Gutierrez RN as Care Transitions Nurse  Ruth Ann Lu MD (Pulmonary Disease)  Lorena Rodriguez MD ([address] [place] [address] Vascular Surgery)     Thank you for allowing me to participate in this patient's care.     Tere Nix MD   [address] [place] [address], [address]  Phone: [phone]

## 2023-01-27 NOTE — PROGRESS NOTES
Pt was seen by Dr. Merced Balbuena. Dr. Merced Balbuena will review films and discuss case with Dr. Paulette Man. Will consult EP for SVT/rhythm  management. Full consult note to follow.

## 2023-01-27 NOTE — CONSULTS
[facility]                    Cardiology Care Note     [x]Initial Encounter     []Follow-up    Patient Name: Carissa Easton - LYY:[birth year] - [facility]:[id number]  Primary Cardiologist: Hans Manuel Cardiology Physicians: Ramu Giron MD  Consulting Cardiologist: Hans Manuel Cardiology Physicians: Ramu Giron MD     Reason for encounter: troponins/st changes. Ep consult    HPI:   Carissa Easton is a 71 y.o. male with PMH significant for ischemic cardiomyopathy on milrinone, CAD s/p PCI x 2, HFrEF, EF 25-30% (NYHA IIIb) on milrinone as bridge to LVAD, type II DM, HTN, HLD, BPH, TRISTAN, CKD, recent admission for CHF exacerbation earlier in month. He now presents with to er with chief c/o BLE edema, SOB and elevated HR day prior. He reports chest discomfort with laying down. Also has had SOB, BLE edema. Overnight pt had SVT with HR to 180's not responsive to adenosine x 2 doses. He then received bolus (per ACNP hospitalist) and amiodarone gtt and converted to NSR. Of note, his magnesium was 1.5.  EP to be consulted for SVT management. AHF managing CHF. General cardiology to evaluate due to EKG with ST abnormalities, elevated troponin. He is currently not having chest pain. 1/27/2023   Patient well-known to us. Initially seen in 2017 and had a previous NSTEMI in 2016 with marginal increase in troponin significant CAD in LAD and OM 2 with drug-eluting stent to both vessels and a normal ejection fraction of 60%. His main risk factor had been and remains diabetes. In June 2018 he had an abnormal stress echo suspicious for multivessel disease with global hypokinesia at peak stress. He underwent cardiac catheterization and eventual bypass surgery date of bypass was July 9, 2018 with a LIMA to the LAD, SVG to OM and SVG to the distal RCA. He remained on ARB diuretic beta-blocker and high potency statin along with diabetes medications. In August 2021 he developed increasing fatigue. He had a small reversible defect of the apex and inferolateral wall in March 2021. After the August visit we stopped his beta-blocker and this did not improve his fatigue so a heart catheterization was done on September 8, 2021 that showed an open LIMA and SVG to RCA. The OM graft was difficult to visualize there was reversible defect in the OM. Intervention was deferred     I saw the patient back on 11/22/2021 and restarted Coreg and asked him to see Dr. Ramez Pearson in follow-up on 2/28/2022 patient had noted occluded vein graft to OM 2 and native OM2 severely diseased along with proximal to mid circumflex a single stent was placed, OM 2 and the circumflex the distal left main. This was approximately 11 months ago. In September he came to see us in the office and he noted increasing fatigue even with small amounts of effort. He had no angina. In October 2022 he had ischemia similar to 3/15/2021 with an EF dropped to 39% echo in October 2022 showed an EF of 45 to 50% with reduced TAPSE indicating RV dysfunction. In December 2022 he was started on Coreg and Skinner Slain and the patient was already on Jardiance his Lasix and HCTZ were stopped for his reduced ejection fraction he was not hypervolemic. He was sent for cath he had open RCA and LAD grafts via vein graft and LIMA long stent from left main via the circumflex showed some proximal in-stent restenosis in the circumflex and ostial plaque in the left main the OM1 was heavily diseased patient was treated. He was then readmitted on December 11 noting CKD 3 in addition the above problems of CHF CAD and CABG. He had been discharged for 3 days but came back with progressive dyspnea chest tightness and orthopnea required BiPAP initially he was diuresed with Lasix 40 mg IV twice daily He had trouble tolerating heart failure meds because of hypotension. His creatinine was 1.57 at discharge.   He was readmitted on December 22 through January 19 for prolonged admission with heart failure cardiogenic shock there was some question he had myocarditis and MRI was done that showed subendocardial infarction in the anterior wall with some viability. There is no evidence of myocarditis. Dr. Shady Ceballos felt that there was good flow the anterior wall and that was the best or viability and there was less viability in the distribution of the circumflex so no further intervention was felt appropriate. He was seen by advanced heart failure team with plans for possible LVAD later he had a right heart cath that showed low filling pressures with reduced cardiac index and he was placed on home milrinone as a bridge to LVAD he also was discharged on a LifeVest.      Today he returns with feeling of tachycardia and several times since discharge worse yesterday. He came into the emergency room was noted after admission to have an SVT to 180 and got adenosine multiple times. He says been having increasing tachycardia and palpitations his shortness of breath is not particularly been worse and is only  With exertion and not at rest.  He has had no chest pain or pressure. In the emergency room he is in a sinus rhythm at around 110. He has lost a lot of weight he is thin but not [employer] cachectic. Good air movement bilaterally regular rate and rhythm with soft systolic murmur no significant pedal edema    Labs show magnesium is low at 1.3 troponin is 3286 sodium 137 potassium 3.9 LFTs are normal White count is 6.7 hemoglobin 9.2. His respiratory viral panel was negative his chest x-ray showed new diffuse bilateral interstitial markings EKG showed sinus tachycardia on 1/27/2023 at 12:30 PM at 10:27 AM it showed long QTC and at 1025 that showed high heart rate SVT with ST changes.     Impression  New supraventricular tachycardia we will ask EP to see patient has been placed on amiodarone because of his risk fo r hemodynamic compensated decompensation probably will be loaded with amiodarone. Replete mag    Congestive heart failure systolic class IV now with LifeVest goal-directed medical therapy being managed by HFS with consideration for LVAD    NSTEMI-start heparin  Coronary disease discussed with Dr. Srikanth Salazar reviewed cath films together today this morning agree that there is good revascularization to the RCA and LAD distributions via the vein grafts and LIMA graft LIMA graft appears to be open well leaving the circumflex has a good blood supply with some plaque but I do not think necessarily flow-limiting. I agree there is not at this time a great option for revascularization that would help his ejection fraction. Plna  Heparin, EP to see, amio, LVAD work up      Patient seen earlier today and examined  and agree with Advance Practice Provider (LIZ, NP,PA)  assessment and plans. Padmini Chatterjee MD             Assessment and Plan      Elevated troponin/NSTEMI:   Troponin trending up 3286 from 2033. Initial EKG with diffuse st -T abnormality,with prolonged QTc. Today's EKG with improved st/t wave abnormalities. QTc is prolonged 599. Continue ASA, BB, high intensity statin. Will start ACS heparin. Dr. Julius Urias to review case with Dr. Srikanth Salazar. SVT:  Corlonor was stopped due to SVT overnight. On amiodareon. Replete magnesium (recheck mg today as speciman hemolyzed this a.m). Prolonged Qtc:  599 on EKG at 10:23 AM. EP notified/aware. CAD:  sp CABG in 2018, cath 9/8/2021 open LIMA, PCI to OM 2 all the way up to the left main 2/28/2022. Cath 12/6/2022 open LIMA and RCA graft some in-stent restenosis in the long graft from the left main to OM 2 but not severe and the OM1 (60-70% ISR)was more severely diseased in the small vessel. Jailed OM1 90%. Recoil of LM and Cx with 40-50% Interventional cardiology felt at the time no reasonable vessel to intervene and recommended medical management.   The OM disease was consistent with a lateral wall ischemia. Now with rise in troponins. Dr. Jesus Eason to review images and discuss with Dr. Janeane Bernheim to review again. Systolic CHF:   ?acute on chronic. CXR with new diffuse bilateral increased interstitial markings, no pleural fluid. Pro bnp higher than previous but now trending down 5052--> 4524. On  IV bumex BID now. Also on digoxin, toprol XL. Cardiac index 2 l/min/m2 on 1/16/23. Management per AHF. On home milrinone. Being evaluated for LVAD. Has lifevest.   Hypomagnesemia: will replete, recheck mg. Anemia: hgb 8.9. Recheck. Appears chronic. Monitor closely on heparin. 8.  History of HTN  9. HLD LDL 87, HDL 40. Continue crestor. Further plan per dr. Missy Wills.       ____________________________________________________________    Cardiac testing  [facility] 12/6/22  1. Normal LVEDP  2. Severe native multivessel coronary artery disease  3. Patent LIMA to LAD and vein graft to distal RCA  4. Recurrent ISR in OM1 stent with now 60 to 70% restenosis  5. Recoil of left main and circumflex stent with now recurrent 40 to 50% stenosis. 6.  Progression of ostial left main disease now to about 60% stenosis  7. Progression of disease in jailed first marginal branch now with diffuse 90% stenosis  8. High-grade stenosis in the mid to distal right potential femoral artery treated with 6 x 40 mm impact drug-coated balloon angioplasty to reduce the stenosis to less than 40%     12/22/22    CARDIAC PROCEDURE 01/16/2023 1/16/2023    Conclusion  Findings  1. Low filling pressures  2. Low normal cardiac output/cardiac index. Cardiac index is 2 L/min/m²    Access right internal jugular vein no issues    Recommendations  1. May hydrate if blood pressures are marginal  2. Continue guideline directed medical therapy if tolerated well    Signed by:  Khushi Jaeger MD on 1/16/2023  4:00 PM      12/22/22    ECHO ADULT FOLLOW-UP OR LIMITED 01/09/2023 1/9/2023    Interpretation Summary    Left Ventricle: Severely reduced left ventricular systolic function with a visually estimated EF of 25 - 30%. Left ventricle size is normal. Mildly increased wall thickness. Signed by: Sherry Ríos MD on 1/9/2023  4:18 PM      12/22/22    NUCLEAR CARDIAC AMYLOID SPECT 12/30/2022 12/30/2022    Narrative    Nuclear Cardiac Amyloid SPECT:  Equivocal for myocardial TTR amyloidosis. History: Cardiomyopathy, evaluate for amyloid. Radiotracer: 16.2 mCi technetium pyrophosphate IV. Comparison: None  Correlative imaging:  None    Nuclear SPECT and PLANAR cardiac images were obtained at least one hour postinjection. FINDINGS:  Semiquantitative visual grading of myocardial radiotracer uptake compared to osseous/rib uptake: Myocardial uptake less than rib uptake (visual score: 1).    H/CL ratio: 1.45    Impression  : Nuclear Cardiac Amyloid SPECT:  Equivocal for myocardial TTR amyloidosis. TTR Amyloidosis reference for interpretation:  A. Not suggestive: a semiquantitative visual score of 0 or H/CL ratio<1.  B: Strongly suggestive: A semiquantitative visual score of 2 or 3 or H/CL ratio >1.5. C. Equivocal: A semiquantitative visual score of 1 or H/CL ratio 1-1.5. Signed by: Levar Farr MD on 12/30/2022 10:00 PM    12/22/22    CARDIAC PROCEDURE 01/16/2023 1/16/2023    Conclusion  Findings  1. Low filling pressures  2. Low normal cardiac output/cardiac index. Cardiac index is 2 L/min/m²    Access right internal jugular vein no issues    Recommendations  1. May hydrate if blood pressures are marginal  2. Continue guideline directed medical therapy if tolerated well    Signed by:  Sherry Ríos MD on 1/16/2023  4:00 PM        Most recent HS troponins:  Recent Labs     01/27/23  1009 01/26/23  1651   TROPHS 3,286* 2,033*       ECG:     Review of Systems:    [x]All other systems reviewed and all negative except as written in HPI    [] Patient unable to provide secondary to condition    Past Medical History:   Diagnosis Date    CAD (coronary artery disease) 11/10/2016    NSTEMI & 2 stents    Deafness 10/28/2012    DM (diabetes mellitus) ([facility] [address]     Elevated cholesterol     Hypertension     [facility]non[facility]ST elevated [facility]) [facility] 11/10/2016     Past Surgical History:   Procedure Laterality Date    COLONOSCOPY N/A 6/28/2018    COLONOSCOPY performed by Steffanie Mcclure MD at [facility] ENDOSCOPY    COLONOSCOPY N/A 1/18/2023    COLONOSCOPY performed by Porsha Lebron MD at [facility] ENDOSCOPY    [address]  11/11/2016    2 stents     Social Hx:  reports that he has never smoked. He has never been exposed to tobacco smoke. He has never used smokeless tobacco. He reports current alcohol use of about 2.0 standard drinks per week. He reports that he does not use drugs. Family Hx: family history includes Elevated Lipids in his brother, brother, and brother; Heart Attack in his father; Heart Disease in his father; Hypertension in his mother; No Known Problems in his daughter, sister, and son. No Known Allergies       OBJECTIVE:  Wt Readings from Last 3 Encounters:   01/26/23 54.4 kg (120 lb)   01/25/23 55.8 kg (123 lb)   01/23/23 54.9 kg (121 lb)       Intake/Output Summary (Last 24 hours) at 1/27/2023 1309  Last data filed at 1/27/2023 1049  Gross per 24 hour   Intake --   Output 1750 ml   Net -1750 ml       Physical Exam:    Vitals:   Vitals:    01/27/23 0400 01/27/23 0417 01/27/23 1012 01/27/23 1036   BP:  (!) 119/5 (!) 119/55 116/65   Pulse: (!) 113 (!) 112 (!) 116 (!) 119   Resp:  22 27    Temp:   98.3 °F (36.8 °C)    SpO2:  95% 94%    Weight:       Height:         Telemetry: sinus rhythm    Gen: Well-developed, frail, thin male, in no acute distress  Neck: Supple,  No Carotid Bruit  Resp: No accessory muscle use, Clear breath sounds, No rales or rhonchi  Card: Regular Rate,Rythm, Normal S1, S2, No murmurs, rubs or gallop. No thrills.    Abd:   Soft, non-tender, non-distended, BS+   MSK: No cyanosis  Skin: No rashes    Neuro: Moving all four extremities, follows commands appropriately  Psych: Good insight, oriented to person, place, alert, Nml Affect  LE: No edema    Data Review:     Radiology:   XR Results (most recent):  Results from Hospital Encounter encounter on 01/26/23    XR CHEST PA LAT    Narrative  Indication:  sob    Exam: PA and lateral views of the chest.    Direct comparison is made to prior CXR dated January 1, 2023. Direct comparison  is also made to prior CT dated January 16, 2023. Findings: Cardiomediastinal silhouette is stable. Median sternotomy wires are  noted. Right-sided PICC line extends to the mid SVC. There are new diffuse  bilateral increased interstitial markings which are partially obscuring  bilateral pulmonary nodules. There is no pleural fluid. There is no  pneumothorax. Impression  New diffuse bilateral increased interstitial markings, partially  obscuring bilateral pulmonary nodules. CT can be performed for further  evaluation, as indicated. Recent Labs     01/27/23  0052 01/26/23 2038 01/26/23  1651     --  136   K 3.9  --  4.6     --  106   CO2 23  --  21   BUN 16  --  19   CREA 1.15  --  1.36*   *  --  198*   PHOS  --  2.6  --    CA 8.9  --  9.3     Recent Labs     01/27/23  0052 01/26/23  1651   WBC 6.7 5.3   HGB 9.2* 8.9*   HCT 29.9* 30.9*    238     Recent Labs     01/27/23  0052 01/26/23  1651   * 128*     No results for input(s): CHOL, LDLC in the last 72 hours.     No lab exists for component: TGL, HDLC,  HBA1C      Current meds:    Current Facility-Administered Medications:     metoprolol succinate (TOPROL-XL) XL tablet 12.5 mg, 12.5 mg, Oral, Q12H, Jennifer Harvey MD, 12.5 mg at 01/27/23 1036    lidocaine 4 % patch 1 Patch, 1 Patch, TransDERmal, Q24H, Nikunj Shi MD, 1 Patch at 01/27/23 1136    aspirin delayed-release tablet 81 mg, 81 mg, Oral, DAILY, Heber Barajas MD, 81 mg at 01/27/23 1036    sodium chloride (NS) flush 5-40 mL, 5-40 mL, IntraVENous, Q8H, Heber Barajas MD, 10 mL at 01/26/23 2220    sodium chloride (NS) flush 5-40 mL, 5-40 mL, IntraVENous, PRN, Heber Barajas MD    acetaminophen (TYLENOL) tablet 650 mg, 650 mg, Oral, Q6H PRN **OR** acetaminophen (TYLENOL) suppository 650 mg, 650 mg, Rectal, Q6H PRN, Heber Barajas MD    polyethylene glycol (MIRALAX) packet 17 g, 17 g, Oral, DAILY PRN, Heber Barajas MD    ondansetron (ZOFRAN ODT) tablet 4 mg, 4 mg, Oral, Q8H PRN **OR** ondansetron (ZOFRAN) injection 4 mg, 4 mg, IntraVENous, Q6H PRN, Heber Barajas MD    furosemide (LASIX) injection 40 mg, 40 mg, IntraVENous, BID, Heber Barajas MD, 40 mg at 01/27/23 1036    digoxin (LANOXIN) tablet 0.125 mg, 0.125 mg, Oral, EVERY OTHER DAY, Heber Barajas MD, 0.125 mg at 01/26/23 2219    heparin (porcine) pf 500 Units, 500 Units, InterCATHeter, PRN, Heber Barajas MD    pantoprazole (PROTONIX) tablet 40 mg, 40 mg, Oral, ACB, Heber Barajas MD, 40 mg at 01/27/23 1036    rosuvastatin (CRESTOR) tablet 20 mg, 20 mg, Oral, QHS, Heber Barajas MD, 20 mg at 01/26/23 2219    zolpidem (AMBIEN) tablet 5 mg, 5 mg, Oral, QHS PRN, Heber Barajas MD    milrinone (PRIMACOR) 20 MG/100 ML D5W infusion, 0.2 mcg/kg/min, IntraVENous, CONTINUOUS, Heber Barajas MD, Last Rate: 3.3 mL/hr at 01/26/23 2056, 0.2 mcg/kg/min at 01/26/23 2056    heparin (porcine) pf 500 Units, 500 Units, InterCATHeter, DAILY, Heber Barajas MD    amiodarone (CORDARONE) 375 mg/250 mL D5W infusion, 0.5-1 mg/min, IntraVENous, TITRATE, Ita Harvey NP, Last Rate: 20 mL/hr at 01/27/23 0415, 0.5 mg/min at 01/27/23 0415    Current Outpatient Medications:     zolpidem (AMBIEN) 5 mg tablet, Take 1 Tablet by mouth nightly as needed for Sleep.  Max Daily Amount: 5 mg., Disp: 30 Tablet, Rfl: 0    furosemide (LASIX) 20 mg tablet, Take 1 Tablet by mouth daily as needed (for weight greater than 120 lb by 2-3 lb or shortness of breath). , Disp: 30 Tablet, Rfl: 0    milrinone (PRIMACOR) 20 mg/100 mL infusion, 0.2 mcg/kg/min by IntraVENous route continuous. infuse at 0.2mcg/kg/min with dosage weight of 52kgs continuously, Disp: , Rfl:     sodium chloride (NS), 10 mL by IntraVENous route daily. flush open port on PICC line with 10mL NS daily and prn for lab draws. , Disp: , Rfl:     heparin sod,porcine/0.9 % NaCl (HEPARIN FLUSH IV), 5 mL by IntraVENous route daily. flush open port on PICC line with 5mL daily and prn after blood draws. , Disp: , Rfl:     milrinone lactate/D5W (MILRINONE IN D5W IV), 34 mg by IntraVENous route every fourty-eight (48) hours. 0.2mcg/kg/min 0.2mg/ml 3.1ml/hr, Disp: , Rfl:     digoxin (LANOXIN) 0.125 mg tablet, Take 1 Tablet by mouth every other day for 30 days. , Disp: 15 Tablet, Rfl: 0    finasteride (PROSCAR) 5 mg tablet, Take 1 Tablet by mouth daily (with dinner) for 30 days. , Disp: 30 Tablet, Rfl: 0    pantoprazole (PROTONIX) 40 mg tablet, Take 1 Tablet by mouth Daily (before breakfast) for 30 days. , Disp: 30 Tablet, Rfl: 0    metFORMIN (GLUCOPHAGE) 500 mg tablet, Take 1 Tablet by mouth two (2) times daily (with meals) for 30 days. , Disp: 60 Tablet, Rfl: 0    flash glucose sensor (FreeStyle Carolynn 2 Sensor) kit, 1 Kit by Does Not Apply route every fourteen (14) days. , Disp: 1 Kit, Rfl: 1    lancets misc, Check blood sugar before meals, bed and as needed. , Disp: 100 Each, Rfl: 1    ivabradine (CORLANOR) 5 mg tablet, Take 0.5 Tablets by mouth two (2) times daily (with meals). , Disp: 30 Tablet, Rfl: 11    glipiZIDE (GLUCOTROL) 5 mg tablet, Take 1 tablet by mouth twice daily, Disp: 180 Tablet, Rfl: 1    ipratropium (ATROVENT) 21 mcg (0.03 %) nasal spray, 2 Sprays by Both Nostrils route every twelve (12) hours. , Disp: 30 mL, Rfl: 0    nitroglycerin (NITROSTAT) 0.4 mg SL tablet, 1 Tablet by SubLINGual route every five (5) minutes as needed for Chest Pain. Up to 3 doses. , Disp: 25 Tablet, Rfl: 3 ergocalciferol (ERGOCALCIFEROL) 1,250 mcg (50,000 unit) capsule, Take 1 Capsule by mouth every seven (7) days. , Disp: 12 Capsule, Rfl: 0    Januvia 50 mg tablet, Take 1 tablet by mouth once daily, Disp: 90 Tablet, Rfl: 0    polyethylene glycol (MIRALAX) 17 gram/dose powder, Take 17 g by mouth daily. (Patient taking differently: Take 17 g by mouth daily as needed for Constipation.), Disp: 510 g, Rfl: 0    rosuvastatin (CRESTOR) 20 mg tablet, Take 1 Tablet by mouth nightly., Disp: 90 Tablet, Rfl: 3    aspirin delayed-release 81 mg tablet, Take 1 Tab by mouth., Disp: , Rfl:     Paul Kaur.  JACOB Rodriguez    [facility] Cardiology  Call center: B) [phone]  (M) [phone]      Teja Becker MD

## 2023-01-27 NOTE — CONSULTS
[facility]  Cardiac Electrophysiology Initial Hospital Care Note                  [x]Initial visit     []Established visit     Patient Name: Johan West - QXN:[id number]  Primary Cardiologist: Dolly Sanders MD  Consulting Cardiologist: Harley Sparks MD     Reason for initial visit: tachycardia    HPI:      SUBJECTIVE:  71 y. o.man with cardiomyopathy and on life vest defibrillator  He is tired and dyspneic  Atrial flutter was present on ECG 2:1 AV conduction  He got IV adenosine and it showed atrial flutter  IV amiodarone broke it  Currently in sinus tachycardia 130 bpm  His daughter is at bedside in ER  He had CABG and ischemic cardiomyopathy  Milrinone is given  He had been seen by Dr Eva Hidalgo and Ashley Alvarez  There is ongoing LVAD work ups       Assessment and Plan     Atrial flutter with rapid ventricular rate- terminated with IV amiodarone  Stop corlanor  I explained to him and his daughter about risks and benefits of amiodarone  Short term was can use it. Long term it may cause lung and thyroid problem in him. Otherwise consider biventricular ICD and AV node ablation   If he is turned down for LVAD then I recommend above procedure. Atrial fibrillation and atypical atrial flutter ablation can be considered but NSR may not last long    Ischemic cardiomyopathy and acute on chronic systolic CHF- continue meds with Dr Billy Ward and Ashley Alvarez    CAD with troponin elevation- Dr Ashley Alvarez and Eva Hidalgo will discuss and review previous cath film    Alexandru Chávez M.D.  [facility]  Electrophysiology/Cardiology  [facility] [place]  [address] [place] [address]                              [phone]                                                     ____________________________________________________________    Cardiac testing  12/22/22    ECHO ADULT FOLLOW-UP OR LIMITED 01/09/2023 1/9/2023    Interpretation Summary    Left Ventricle: Severely reduced left ventricular systolic function with a visually estimated EF of 25 - 30%. Left ventricle size is normal. Mildly increased wall thickness. Signed by: Juventino Frederick MD on 1/9/2023  4:18 PM        12/22/22    NUCLEAR CARDIAC AMYLOID SPECT 12/30/2022 12/30/2022    Narrative    Nuclear Cardiac Amyloid SPECT:  Equivocal for myocardial TTR amyloidosis. History: Cardiomyopathy, evaluate for amyloid. Radiotracer: 16.2 mCi technetium pyrophosphate IV. Comparison: None  Correlative imaging:  None    Nuclear SPECT and PLANAR cardiac images were obtained at least one hour postinjection. FINDINGS:  Semiquantitative visual grading of myocardial radiotracer uptake compared to osseous/rib uptake: Myocardial uptake less than rib uptake (visual score: 1).    H/CL ratio: 1.45    Impression  : Nuclear Cardiac Amyloid SPECT:  Equivocal for myocardial TTR amyloidosis. TTR Amyloidosis reference for interpretation:  A. Not suggestive: a semiquantitative visual score of 0 or H/CL ratio<1.  B: Strongly suggestive: A semiquantitative visual score of 2 or 3 or H/CL ratio >1.5. C. Equivocal: A semiquantitative visual score of 1 or H/CL ratio 1-1.5. Signed by: Sheila Moran MD on 12/30/2022 10:00 PM    12/22/22    CARDIAC PROCEDURE 01/16/2023 1/16/2023    Conclusion  Findings  1. Low filling pressures  2. Low normal cardiac output/cardiac index. Cardiac index is 2 L/min/m²    Access right internal jugular vein no issues    Recommendations  1. May hydrate if blood pressures are marginal  2. Continue guideline directed medical therapy if tolerated well    Signed by:  Juventino Frederick MD on 1/16/2023  4:00 PM        Most recent HS troponins:  Recent Labs     01/27/23  1009 01/26/23  1651   TROPHS 3,286* 2,033*     ECG: atrial flutter with RVR  Review of Systems    [x]All other systems reviewed and all negative except as written in HPI    [] Patient unable to provide secondary to condition         Past Medical History:   Diagnosis Date    CAD (coronary artery disease) 11/10/2016    NSTEMI & 2 stents    Deafness 10/28/2012    DM (diabetes mellitus) ([facility]     Elevated cholesterol     Hypertension     [facility]STEM[facility] (non-ST elevated myocardial infarction) [facility] 11/10/2016     Past Surgical History:   Procedure Laterality Date    COLONOSCOPY N/A 6/28/2018    COLONOSCOPY performed by Giu Thompson MD at [facility] ENDOSCOPY    COLONOSCOPY N/A 1/18/2023    COLONOSCOPY performed by Claudia Mullen MD at [facility] ENDOSCOPY    [address]  11/11/2016    2 stents     Social Hx:  reports that he has never smoked. He has never been exposed to tobacco smoke. He has never used smokeless tobacco. He reports current alcohol use of about 2.0 standard drinks per week. He reports that he does not use drugs. Family Hx: family history includes Elevated Lipids in his brother, brother, and brother; Heart Attack in his father; Heart Disease in his father; Hypertension in his mother; No Known Problems in his daughter, sister, and son.   No Known Allergies       OBJECTIVE:  Wt Readings from Last 3 Encounters:   01/26/23 120 lb (54.4 kg)   01/25/23 123 lb (55.8 kg)   01/23/23 121 lb (54.9 kg)       Intake/Output Summary (Last 24 hours) at 1/27/2023 1502  Last data filed at 1/27/2023 1049  Gross per 24 hour   Intake --   Output 1750 ml   Net -1750 ml         Physical Exam    Vitals:   Vitals:    01/27/23 0417 01/27/23 1012 01/27/23 1036 01/27/23 1434   BP: (!) 119/5 (!) 119/55 116/65 126/74   Pulse: (!) 112 (!) 116 (!) 119 (!) 123   Resp: 22 27 21   Temp:  98.3 °F (36.8 °C)  98.6 °F (37 °C)   SpO2: 95% 94%  92%   Weight:       Height:         Telemetry:  sinus tachycardia    Visit Vitals  /74   Pulse (!) 123   Temp 98.6 °F (37 °C)   Resp 21   Ht 5' 9\" (1.753 m)   Wt 120 lb (54.4 kg)   SpO2 92%   BMI 17.72 kg/m² General Appearance:  thin,alert and oriented x 3, and individual in no acute distress. Ears/Nose/Mouth/Throat:   Hearing grossly normal.         Neck: Supple. Chest:   Lungs clear to auscultation bilaterally. Cardiovascular:  Rapid rate and regular rhythm, no murmur. Abdomen:   Soft, non-tender, flat   Extremities: No edema bilaterally. Skin: Warm and dry. Data Review:     Radiology:   XR Results (most recent):  Results from Hospital Encounter encounter on 01/26/23    XR CHEST PA LAT    Narrative  Indication:  sob    Exam: PA and lateral views of the chest.    Direct comparison is made to prior CXR dated January 1, 2023. Direct comparison  is also made to prior CT dated January 16, 2023. Findings: Cardiomediastinal silhouette is stable. Median sternotomy wires are  noted. Right-sided PICC line extends to the mid SVC. There are new diffuse  bilateral increased interstitial markings which are partially obscuring  bilateral pulmonary nodules. There is no pleural fluid. There is no  pneumothorax. Impression  New diffuse bilateral increased interstitial markings, partially  obscuring bilateral pulmonary nodules. CT can be performed for further  evaluation, as indicated. CT Results (most recent):  Results from Hospital Encounter encounter on 12/22/22    CT CHEST WO CONT    Narrative  INDICATION: Shortness of breath with recent lung nodules. COMPARISON: 12/12/2022    CONTRAST: None. TECHNIQUE:  5 mm axial images were obtained through the chest. Coronal and  sagittal reformats were generated. CT dose reduction was achieved through use  of a standardized protocol tailored for this examination and automatic exposure  control for dose modulation. The absence of intravenous contrast reduces the sensitivity for evaluation of  the mediastinum, dinorah, vasculature, and upper abdominal organs. FINDINGS:    CHEST WALL: Right subclavian central venous catheter.  No axillary or  supraclavicular adenopathy. Median sternotomy changes. THYROID: No nodule. MEDIASTINUM: No mass or lymphadenopathy. ANDREEA: No mass or lymphadenopathy. THORACIC AORTA: No aneurysm. MAIN PULMONARY ARTERY: Normal in caliber. TRACHEA/BRONCHI: Unremarkable  ESOPHAGUS: No wall thickening or dilatation. HEART: Normal in size. Coronary artery calcium: present  PLEURA: No effusion or pneumothorax. LUNGS: Multiple groundglass pulmonary nodules scattered throughout the lungs  which have increased in number since prior study. Decreased bilateral pleural  effusions. INCIDENTALLY IMAGED UPPER ABDOMEN: Cholelithiasis  BONES: No destructive bone lesion. Impression  1. Small ground glass nodules scattered throughout the lungs. Findings favor  infectious etiology. Recommend short-term follow-up chest CT in 4-6 weeks. 2.  Cholelithiasis. Resolution of previous pleural effusions. MRI Results (most recent):  Results from [place] encounter on 12/22/22    MRI CARDIAC MORPH FUNC W WO CONT    Narrative  CARDIOVASCULAR MRI    INDICATION: suspected myocarditis. Dr. Nigel Culver aware of need for MRI and planned  for Tuesday at [facility]:    CPT Codes: 97959    SCANS PERFORMED:  Spin Echo: Axial.  FIESTA/SSFP  LGE    Contrast Agent: ProHance. Contrast Dose: 15ml. CLINICAL DATA:  HR = 103/min, BP = 81/61mmHg,  HT = 5 foot 9inc, WT = 108lb. Impression  1. Normal left ventricular cavity size. Severe left ventricular systolic  dysfunction. Severe hypokinesis of the base to mid and distal anterior wall,  anteroseptal wall, anteroapical wall, apical septal wall and apex. Mild  hypokinesis of the lateral wall. 3-D LVEF 23%. 2. Normal right ventricular size and systolic function. 3. Sclerotic aortic valve leaflets. Mild to moderate aortic valve stenosis. Aortic valve area is 1.3 sq cm by planimetry. 4. On T2 W imaging there is no significant myocardial edema seen.  On EGE and LGE  study, there is patchy subendocardial infarct involving LAD territory including  mid to distal anterior wall, anteroseptal wall, anteroapical wall, apex, apical  septal wall involving less than 25% thickness of the myocardial wall with  thinning of the apex and apical septal wall. There is medium grade myocardial  viability of these walls. There is a small to medium size subendocardial infarct  of the basal inferior wall. There is mild patchy subepicardial enhancement of  the lateral wall suggestive of nonischemic etiology. There is no features of  infiltrative sarcoidosis or cardiac amyloidosis. 5. Normal pleura and pericardium. There is no significant effusions. Pericardium:  Normal. (<3.5mm)  Pericardial Effusion:  None. Pleural Effusion:  None. VOLUMETRIC DATA:    LVEDVI:  71 ml/m2 (Normal 47-92 mL/sq-m)  LVESVI:  55 ml/m2 (Normal 13-30 mL/sq-m)  LVSVI:  17 ml/m2 (Normal 32-62 mL/sq-m)  LVMI:  59 g/m2        No results for input(s): CPK, TROIQ in the last 72 hours.     No lab exists for component: CKQMB, CPKMB, BMPP  Recent Labs     01/27/23 0052 01/26/23 2038 01/26/23  1651     --  136   K 3.9  --  4.6     --  106   CO2 23  --  21   BUN 16  --  19   CREA 1.15  --  1.36*   *  --  198*   PHOS  --  2.6  --    CA 8.9  --  9.3     Recent Labs     01/27/23  0052 01/26/23  1651   WBC 6.7 5.3   HGB 9.2* 8.9*   HCT 29.9* 30.9*    238     Recent Labs     01/27/23 0052 01/26/23  1651   * 128*     Current meds:    Current Facility-Administered Medications:     metoprolol succinate (TOPROL-XL) XL tablet 12.5 mg, 12.5 mg, Oral, Q12H, Danuta Viveros MD, 12.5 mg at 01/27/23 1036    lidocaine 4 % patch 1 Patch, 1 Patch, TransDERmal, Q24H, Andrew Mendoza MD, 1 Patch at 01/27/23 1136    magnesium sulfate 2 g/50 ml IVPB (premix or compounded), 2 g, IntraVENous, ONCE, Mehrdad Rodriguez, NP, Last Rate: 50 mL/hr at 01/27/23 1434, 2 g at 01/27/23 1434    heparin 25,000 units in D5W 250 ml infusion, 12-25 Units/kg/hr, IntraVENous, TITRATE, Barak Rodriguez., NP    perflutren lipid microspheres (DEFINITY) in NS bolus IV, 1 mL, IntraVENous, PRN, Nettie Mosley MD, 1 mL at 01/27/23 1453    aspirin delayed-release tablet 81 mg, 81 mg, Oral, DAILY, Heber Barajas MD, 81 mg at 01/27/23 1036    sodium chloride (NS) flush 5-40 mL, 5-40 mL, IntraVENous, Q8H, Heber Barajas MD, 10 mL at 01/27/23 1442    sodium chloride (NS) flush 5-40 mL, 5-40 mL, IntraVENous, PRN, Heber Barajas MD    acetaminophen (TYLENOL) tablet 650 mg, 650 mg, Oral, Q6H PRN **OR** acetaminophen (TYLENOL) suppository 650 mg, 650 mg, Rectal, Q6H PRN, Heber Barajas MD    polyethylene glycol (MIRALAX) packet 17 g, 17 g, Oral, DAILY PRN, Heber Barajas MD    ondansetron (ZOFRAN ODT) tablet 4 mg, 4 mg, Oral, Q8H PRN **OR** ondansetron (ZOFRAN) injection 4 mg, 4 mg, IntraVENous, Q6H PRN, Heber Barajas MD    furosemide (LASIX) injection 40 mg, 40 mg, IntraVENous, BID, Heber Barajas MD, 40 mg at 01/27/23 1036    digoxin (LANOXIN) tablet 0.125 mg, 0.125 mg, Oral, EVERY OTHER DAY, Heber Barajas MD, 0.125 mg at 01/26/23 2219    heparin (porcine) pf 500 Units, 500 Units, InterCATHeter, PRN, Heber Barajas MD    pantoprazole (PROTONIX) tablet 40 mg, 40 mg, Oral, ACB, Heber Barajas MD, 40 mg at 01/27/23 1036    rosuvastatin (CRESTOR) tablet 20 mg, 20 mg, Oral, QHS, Heber Barajas MD, 20 mg at 01/26/23 2219    zolpidem (AMBIEN) tablet 5 mg, 5 mg, Oral, QHS PRN, Heber Barajas MD    milrinone (PRIMACOR) 20 MG/100 ML D5W infusion, 0.2 mcg/kg/min, IntraVENous, CONTINUOUS, Heber Barajas MD, Last Rate: 3.3 mL/hr at 01/26/23 2056, 0.2 mcg/kg/min at 01/26/23 2056    heparin (porcine) pf 500 Units, 500 Units, InterCATHeter, DAILY, Heber Barajas MD    amiodarone (CORDARONE) 375 mg/250 mL D5W infusion, 0.5-1 mg/min, IntraVENous, TITRATE, Ita Harvey, NP, Last Rate: 20 mL/hr at 01/27/23 0415, 0.5 mg/min at 01/27/23 0415    Current Outpatient Medications:     polyethylene glycol (Miralax) 17 gram/dose powder, Take 17 g by mouth daily as needed for Constipation. , Disp: , Rfl:     furosemide (LASIX) 20 mg tablet, Take 1 Tablet by mouth daily as needed (for weight greater than 120 lb by 2-3 lb or shortness of breath). , Disp: 30 Tablet, Rfl: 0    milrinone (PRIMACOR) 20 mg/100 mL infusion, 0.2 mcg/kg/min by IntraVENous route continuous. infuse at 0.2mcg/kg/min with dosage weight of 52kgs continuously, Disp: , Rfl:     digoxin (LANOXIN) 0.125 mg tablet, Take 1 Tablet by mouth every other day for 30 days. , Disp: 15 Tablet, Rfl: 0    finasteride (PROSCAR) 5 mg tablet, Take 1 Tablet by mouth daily (with dinner) for 30 days. , Disp: 30 Tablet, Rfl: 0    pantoprazole (PROTONIX) 40 mg tablet, Take 1 Tablet by mouth Daily (before breakfast) for 30 days. , Disp: 30 Tablet, Rfl: 0    metFORMIN (GLUCOPHAGE) 500 mg tablet, Take 1 Tablet by mouth two (2) times daily (with meals) for 30 days. , Disp: 60 Tablet, Rfl: 0    ivabradine (CORLANOR) 5 mg tablet, Take 0.5 Tablets by mouth two (2) times daily (with meals). , Disp: 30 Tablet, Rfl: 11    glipiZIDE (GLUCOTROL) 5 mg tablet, Take 1 tablet by mouth twice daily, Disp: 180 Tablet, Rfl: 1    ipratropium (ATROVENT) 21 mcg (0.03 %) nasal spray, 2 Sprays by Both Nostrils route every twelve (12) hours. , Disp: 30 mL, Rfl: 0    ergocalciferol (ERGOCALCIFEROL) 1,250 mcg (50,000 unit) capsule, Take 1 Capsule by mouth every seven (7) days. (Patient taking differently: Take 50,000 Units by mouth every seven (7) days. Mondays), Disp: 12 Capsule, Rfl: 0    Januvia 50 mg tablet, Take 1 tablet by mouth once daily, Disp: 90 Tablet, Rfl: 0    rosuvastatin (CRESTOR) 20 mg tablet, Take 1 Tablet by mouth nightly., Disp: 90 Tablet, Rfl: 3    aspirin delayed-release 81 mg tablet, Take 81 mg by mouth daily. , Disp: , Rfl:     zolpidem (AMBIEN) 5 mg tablet, Take 1 Tablet by mouth nightly as needed for Sleep.  Max Daily Amount: 5 mg., Disp: 30 Tablet, Rfl: 0    nitroglycerin (NITROSTAT) 0.4 mg SL tablet, 1 Tablet by SubLINGual route every five (5) minutes as needed for Chest Pain. Up to 3 doses. , Disp: 25 Tablet, Rfl: 3    Rocio Smart MD  [facility], [address] [address]  [place], [facility]  [phone]      Son Duval MD

## 2023-01-27 NOTE — ED NOTES
Bedside and Verbal shift change report given to Jose Diaz RN (oncoming nurse) by Frank Pena RN (offgoing nurse). Report included the following information SBAR, ED Summary, Intake/Output, MAR, Recent Results and Cardiac Rhythm Sinus Tach.

## 2023-01-27 NOTE — PROGRESS NOTES
[id number]  Hospitalist Group                                                                                          Hospitalist Progress Note  John Ramirez MD  Answering service: [phone] OR [phone] from in house phone        Date of Service:  2023  NAME:  Pollo Muro  :  1950  MRN:  [id number]       Admission Summary:   HPI: \"Neal Kendall is a 71 y.o. male with history of ischemic cardiomyopathy, CAD status post PCI x2 HFrEF EF 25-30 stage D, NYHA class IIIb recently placed on milrinone drip as bridge to LVAD therapy, type 2 diabetes, hypertension, dyslipidemia, BPH, dyslipidemia, TRISTAN, CKD stage III who presents to hospital with complaints of elevated heart rate, difficulty with urination. Patient and wife state he had felt only mild improvement since discharge from hospital after his recent hospitalization. He was scheduled for outpatient voiding trial with urology and had Vasquez catheter removed. Wife states patient was able to void about 140 mL. Wife states since discharge, she has noted orthopnea, persistent tachycardia with heart rates in 120s and low BPs with systolics in the 09I as well as bilateral lower extremity edema. Patient however states he has not experienced any significant breathing issues. The patient denies any fever, chills, chest or abdominal pain, nausea, vomiting, cough, congestion, recent illness, palpitations, or dysuria. Remarkable vitals on ER Presentation: Heart rate 126  Labs Remarkable for: HGB 8.6, TROP 2033, glu 198, cr 1.36, bnp   ER Images: cxr: New diffuse bilateral increased interstitial markings, partially  obscuring bilateral pulmonary nodules. CT can be performed for further  evaluation, as indicated. \"       Interval history / Subjective:   Patient seen examined at bedside earlier.   Still tachycardic, shortness of breath with positional movements     Assessment & Plan:      Decompensated HFrEF LVEF 25-30% / Ischemic Cardiomyopathy / Milrinone gtt  CAD s/p CABG x2  SVT  NSTEMI  -Continue amiodarone . Appreciate EP, cardio and AHF  -Continue diuresis with Lasix 40 mg IV twice daily  -cont dig, dig level pending  -heparin gtt, per cardio no need for rpt cath   -Follow lytes goal K greater than 4, mag greater than 2  -Strict I's and O's  - toprol 12.5 bid, cannot tolerate ACE/ARB due to hypotension, cannot tolerate spironolactone secondary to hyperkalemia, cannot tolerate Jardiance due to significant diuresis lower doses  -stopped corlanor due to svt  On lifevest   -PTOT    BPH with urinary retention  -Failed recent voiding trial  -Plan to place Vasquez back in  -urology consult      CKD stage III   -Stable. Monitor with IV diuresis  GERD -chronic Continue PPI     PAD  / S/p Right SFA PTCA -followed by Dr. Kera Bajwa   -Normal ABIs on recent admission     Critically ill, extremely high risk for decompensation.   CCT time 45 minutes    Code status: full   Prophylaxis: scd  Care Plan discussed with: patient/family at bedside, nurse, case management  Anticipated Disposition: Greater than 48 hours   Cardiac monitoring: xTelemetry      Hospital Problems  Date Reviewed: 1/24/2023            Codes Class Noted POA    CHF (congestive heart failure) (HCC) ICD-10-CM: I50.9  ICD-9-CM: 428.0  12/12/2022 Unknown           Social Determinants of Health     Tobacco Use: Low Risk     Smoking Tobacco Use: Never    Smokeless Tobacco Use: Never    Passive Exposure: Never   Alcohol Use: Not on file   Financial Resource Strain: Medium Risk    Difficulty of Paying Living Expenses: Somewhat hard   Food Insecurity: Food Insecurity Present    Worried About Running Out of Food in the Last Year: Never true    Ran Out of Food in the Last Year: Often true   Transportation Needs: Not on file   Physical Activity: Not on file   Stress: Not on file   Social Connections: Not on file   Intimate Partner Violence: Not on file   Depression: Not at risk    PHQ-2 Score: 0 Housing Stability: Not on file       Review of Systems:   A comprehensive review of systems was negative except for that written in the HPI. Vital Signs:    Last 24hrs VS reviewed since prior progress note. Most recent are:  Visit Vitals  BP (!) 126/57   Pulse (!) 122   Temp 98.9 °F (37.2 °C)   Resp 24   Ht 5' 9\" (1.753 m)   Wt 54.4 kg (120 lb)   SpO2 96%   BMI 17.72 kg/m²         Intake/Output Summary (Last 24 hours) at 1/27/2023 1610  Last data filed at 1/27/2023 1532  Gross per 24 hour   Intake --   Output 3150 ml   Net -3150 ml        Physical Examination:     I had a face to face encounter with this patient and independently examined them on 1/27/2023 as outlined below:          Constitutional:  No acute distress, cooperative, pleasant    ENT:  Oral mucosa moist, oropharynx benign. Resp:  Crackles bl bases. No wheezing/rhonchi/rales. No accessory muscle use. CV:  Regular rhythm, normal rate, no murmurs, gallops, rubs    GI:  Soft, non distended, non tender. normoactive bowel sounds, no hepatosplenomegaly     Musculoskeletal:  trace edema, warm, 2+ pulses throughout    Neurologic:  Moves all extremities.   AAOx3, CN II-XII reviewed            Data Review:    Review and/or order of clinical lab test  Review and/or order of tests in the radiology section of CPT  Review and/or order of tests in the medicine section of CPT    I have independently reviewed and interpreted patient's lab and all other diagnostic data    Labs:     Recent Labs     01/27/23 0052 01/26/23  1651   WBC 6.7 5.3   HGB 9.2* 8.9*   HCT 29.9* 30.9*    238     Recent Labs     01/27/23  1448 01/27/23  0052 01/26/23  2038 01/26/23  1651 01/25/23  1155   NA  --  137  --  136 137   K  --  3.9  --  4.6 4.7   CL  --  105  --  106 103   CO2  --  23  --  21 18*   BUN  --  16  --  19 20   CREA  --  1.15  --  1.36* 1.14   GLU  --  237*  --  198* 206*   CA  --  8.9  --  9.3 9.3   MG 2.2 1.3* 1.5*  --  1.5*   PHOS  --   --  2.6  --   -- Recent Labs     01/27/23  0052 01/26/23  1651 01/25/23  1155   ALT 23 27 21   * 128* 134*   TBILI 0.5 0.4 0.4   TP 6.6 7.0 6.1   ALB 3.0* 3.2* 3.8   GLOB 3.6 3.8  --      Recent Labs     01/27/23  1434   APTT 51.7*      No results for input(s): FE, TIBC, PSAT, FERR in the last 72 hours. Lab Results   Component Value Date/Time    Folate 10.5 07/03/2018 09:24 AM      No results for input(s): PH, PCO2, PO2 in the last 72 hours. No results for input(s): CPK, CKNDX, TROIQ in the last 72 hours. No lab exists for component: CPKMB  Lab Results   Component Value Date/Time    Cholesterol, total 170 01/12/2023 05:00 AM    HDL Cholesterol 40 01/12/2023 05:00 AM    LDL, calculated 87 01/12/2023 05:00 AM    Triglyceride 215 (H) 01/12/2023 05:00 AM    CHOL/HDL Ratio 4.3 01/12/2023 05:00 AM     Lab Results   Component Value Date/Time    Glucose (POC) 329 (H) 01/19/2023 11:33 AM    Glucose (POC) 311 (H) 01/19/2023 10:54 AM    Glucose (POC) 207 (H) 01/19/2023 06:58 AM    Glucose (POC) 304 (H) 01/18/2023 10:24 PM    Glucose (POC) 295 (H) 01/18/2023 05:33 PM     Lab Results   Component Value Date/Time    Color YELLOW/STRAW 01/27/2023 11:35 AM    Appearance CLEAR 01/27/2023 11:35 AM    Specific gravity 1.010 01/27/2023 11:35 AM    Specific gravity 1.013 01/01/2023 09:13 AM    pH (UA) 6.0 01/27/2023 11:35 AM    Protein Negative 01/27/2023 11:35 AM    Glucose 500 (A) 01/27/2023 11:35 AM    Ketone Negative 01/27/2023 11:35 AM    Bilirubin Negative 01/27/2023 11:35 AM    Urobilinogen 0.2 01/27/2023 11:35 AM    Nitrites Negative 01/27/2023 11:35 AM    Leukocyte Esterase TRACE (A) 01/27/2023 11:35 AM    Epithelial cells FEW 01/27/2023 11:35 AM    Bacteria Negative 01/27/2023 11:35 AM    WBC 0-4 01/27/2023 11:35 AM    RBC 10-20 01/27/2023 11:35 AM       Notes reviewed from all clinical/nonclinical/nursing services involved in patient's clinical care.  Care coordination discussions were held with appropriate clinical/nonclinical/ nursing providers based on care coordination needs. Patients current active Medications were reviewed, considered, added and adjusted based on the clinical condition today. Home Medications were reconciled to the best of my ability given all available resources at the time of admission. Route is PO if not otherwise noted.       Medications Reviewed:     Current Facility-Administered Medications   Medication Dose Route Frequency    metoprolol succinate (TOPROL-XL) XL tablet 12.5 mg  12.5 mg Oral Q12H    lidocaine 4 % patch 1 Patch  1 Patch TransDERmal Q24H    heparin 25,000 units in D5W 250 ml infusion  12-25 Units/kg/hr IntraVENous TITRATE    amiodarone (CORDARONE) tablet 200 mg  200 mg Oral TID    aspirin delayed-release tablet 81 mg  81 mg Oral DAILY    sodium chloride (NS) flush 5-40 mL  5-40 mL IntraVENous Q8H    sodium chloride (NS) flush 5-40 mL  5-40 mL IntraVENous PRN    acetaminophen (TYLENOL) tablet 650 mg  650 mg Oral Q6H PRN    Or    acetaminophen (TYLENOL) suppository 650 mg  650 mg Rectal Q6H PRN    polyethylene glycol (MIRALAX) packet 17 g  17 g Oral DAILY PRN    ondansetron (ZOFRAN ODT) tablet 4 mg  4 mg Oral Q8H PRN    Or    ondansetron (ZOFRAN) injection 4 mg  4 mg IntraVENous Q6H PRN    furosemide (LASIX) injection 40 mg  40 mg IntraVENous BID    digoxin (LANOXIN) tablet 0.125 mg  0.125 mg Oral EVERY OTHER DAY    heparin (porcine) pf 500 Units  500 Units InterCATHeter PRN    pantoprazole (PROTONIX) tablet 40 mg  40 mg Oral ACB    rosuvastatin (CRESTOR) tablet 20 mg  20 mg Oral QHS    zolpidem (AMBIEN) tablet 5 mg  5 mg Oral QHS PRN    milrinone (PRIMACOR) 20 MG/100 ML D5W infusion  0.2 mcg/kg/min IntraVENous CONTINUOUS    heparin (porcine) pf 500 Units  500 Units InterCATHeter DAILY    amiodarone (CORDARONE) 375 mg/250 mL D5W infusion  0.5-1 mg/min IntraVENous TITRATE     ______________________________________________________________________  EXPECTED LENGTH OF STAY: - - -  ACTUAL LENGTH OF STAY:          1                 Doug Stallworth MD

## 2023-01-27 NOTE — BH NOTES
Care Management:    Transition of Care Plan:     RUR:   Patient was on home Milrinone. [facility] and [address] (PT,OT, SN, aid) were the providers  Disposition: home with wife and [facility] vs SNF  Transportation: wife    12-5 to 12-8-22 IP at [facility]  [phone] IP at [facility]  12-22-22 to 1-19-23 IP at [address] [place] [address] on home Milrinone. Called phone listed for patient. His wife answered. She said he is hard of hearing and always lists her number for him. Wife answered below questions. While at home this time patient needed assistance with bathing and dressing. The first day home the wife assisted him with bathing. She has neck problems and had increased pain once finished with assisting him. Since the first day, the home health aid has assisted him with bathing. Patient is able to ambulate with walker to restroom. Wife said last admission they discussed SNF, but then everyone decided he should go home. She also said there were only 1 or 2 facilities that can manage home Milrinone. Wife is not sure if he will be able to come home after this admission or if he will need to go to a SNF. Discussed Medicaid personal care. Explained he would likely not qualify at this time since he is only dependent in 2 ADLs, but explained the process for the [employer], etc in case he were to qualify in future. Demographics confirmed: yes  Living Situation: Patient lives with his wife in a two story home with 6 steps to enter and 16 steps to second floor bedroom. DME: rolling walker (uses), life vest,   ADLs: Patient needs assist with bathing and dressing. The aids with [address] assist him with bathing. Wife has neck problems and is not physically able to assist him with bathing.   Pharmacy: [facility] [address]  Previous HH/SNF/rehab: [facility], [address] [place] [address] Ave: [facility] Medicare and Medicaid dually enrolled plan  Transportation: wife, children       Reason for Readmission:     HF RUR Score/Risk Level:     24%    PCP: First and Last name:  Dr. Kasia Saldana    Name of Practice: [facility]   Are you a current patient: Yes/No: yes   Approximate date of last visit: 1-24-23   Can you participate in a virtual visit with your PCP: yes    Is a Care Conference indicated: no    Did you attend your follow up appointment (s): If not, why not: yes       Resources/supports as identified by patient/family:   family, MDs, home health        Top Challenges facing patient (as identified by patient/family and CM): Finances/Medication cost?     None, Has [facility] Medicare and Medicaid  Transportation      wife, children  Support system or lack thereof? Wife, multiple adult children  Living arrangements? Lives with wife       Self-care/ADLs/Cognition? Oriented, needs assists with bathing, dressing       Current Advanced Directive/Advance Care Plan:             Plan for utilizing home health:   Was open to [facility] for SN, PT, OT, aid, home Milrinone, [facility]. Wife would like to use the same agencies if he goes home. Transition of Care Plan:    Based on readmission, the patient's previous Plan of Care   has been evaluated and/or modified.  The current Transition of Care Plan is:           Readmission Assessment  Number of days since last admission?: 1-7 days  Previous disposition: (P) Home with Home Health (IV Milrinone, [facility], [address])  Who is being interviewed?: (P) Caregiver  What was the patient's/caregiver's perception as to why they think they needed to return back to the hospital?: (P) Other (Comment)  Did you visit your Primary Care Physician after you left the hospital, before you returned this time?: (P) Yes  Did you see a specialist, such as Cardiac, Pulmonary, Orthopedic Physician, etc. after you left the hospital?: (P) Yes  Who advised the patient to return to the hospital?: (P) Physician  Does the patient report anything that got in the way of taking their medications?: (P) No     Care Management Interventions  PCP Verified by CM: Yes (Dr. Leta Costa)  Last Visit to PCP: 01/24/23  Palliative Care Criteria Met (RRAT>21 & CHF Dx)?: No  Mode of Transport at Discharge:  Other (see comment)  Transition of Care Consult (CM Consult): Home Health, Discharge Justen [id number] [address]: Yes  Discharge Durable Medical Equipment: No  Physical Therapy Consult: Yes  Occupational Therapy Consult: Yes  Speech Therapy Consult: No  Support Systems: Spouse/Significant Other, Child(sebastián)  Confirm Follow Up Transport: Family  [employer] Information Provided?: No  Discharge Location  Patient Expects to be Discharged to[de-identified] Home with home health    CODI Witt

## 2023-01-27 NOTE — PROGRESS NOTES
Admission Medication Reconciliation:    Information obtained from:  Patient, famiy  RxQuery data available¹:  YES    Comments/Recommendations: Updated PTA meds/reviewed patient's allergies. 1)  Patient reports compliance with his medications since discharged from hospital last week. He has since been prescribed zolpidem for insomnia, which he has not started yet at home however would like to start in hospital as he was not able to sleep last night. 2)  No changes have been made to the PTA medication list since last review. 3)  Patient is on milrinone drip at home. ¹RxQuery pharmacy benefit data reflects medications filled and processed through the patient's insurance, however   this data does NOT capture whether the medication was picked up or is currently being taken by the patient. Allergies:  Patient has no known allergies. Significant PMH/Disease States:   Past Medical History:   Diagnosis Date    CAD (coronary artery disease) 11/10/2016    NSTEMI & 2 stents    Deafness 10/28/2012    DM (diabetes mellitus) ([facility]     Elevated cholesterol     Hypertension     NSTEMI (non-ST elevated myocardial infarction) ([facility] 11/10/2016     Chief Complaint for this Admission:    Chief Complaint   Patient presents with    Shortness of Breath     Prior to Admission Medications:   Prior to Admission Medications   Prescriptions Last Dose Informant Taking? Januvia 50 mg tablet   Yes   Sig: Take 1 tablet by mouth once daily   aspirin delayed-release 81 mg tablet   Yes   Sig: Take 81 mg by mouth daily. digoxin (LANOXIN) 0.125 mg tablet   Yes   Sig: Take 1 Tablet by mouth every other day for 30 days. ergocalciferol (ERGOCALCIFEROL) 1,250 mcg (50,000 unit) capsule   Yes   Sig: Take 1 Capsule by mouth every seven (7) days. Patient taking differently: Take 50,000 Units by mouth every seven (7) days. Mondays   finasteride (PROSCAR) 5 mg tablet   Yes   Sig: Take 1 Tablet by mouth daily (with dinner) for 30 days. furosemide (LASIX) 20 mg tablet   Yes   Sig: Take 1 Tablet by mouth daily as needed (for weight greater than 120 lb by 2-3 lb or shortness of breath). glipiZIDE (GLUCOTROL) 5 mg tablet   Yes   Sig: Take 1 tablet by mouth twice daily   ipratropium (ATROVENT) 21 mcg (0.03 %) nasal spray   Yes   Si Sprays by Both Nostrils route every twelve (12) hours. ivabradine (CORLANOR) 5 mg tablet   Yes   Sig: Take 0.5 Tablets by mouth two (2) times daily (with meals). metFORMIN (GLUCOPHAGE) 500 mg tablet   Yes   Sig: Take 1 Tablet by mouth two (2) times daily (with meals) for 30 days. milrinone (PRIMACOR) 20 mg/100 mL infusion   Yes   Si.2 mcg/kg/min by IntraVENous route continuous. infuse at 0.2mcg/kg/min with dosage weight of 52kgs continuously   nitroglycerin (NITROSTAT) 0.4 mg SL tablet   No   Si Tablet by SubLINGual route every five (5) minutes as needed for Chest Pain. Up to 3 doses. pantoprazole (PROTONIX) 40 mg tablet   Yes   Sig: Take 1 Tablet by mouth Daily (before breakfast) for 30 days. polyethylene glycol (Miralax) 17 gram/dose powder   Yes   Sig: Take 17 g by mouth daily as needed for Constipation. rosuvastatin (CRESTOR) 20 mg tablet   Yes   Sig: Take 1 Tablet by mouth nightly. zolpidem (AMBIEN) 5 mg tablet   Yes   Sig: Take 1 Tablet by mouth nightly as needed for Sleep. Max Daily Amount: 5 mg. Facility-Administered Medications: None     Please contact the main inpatient pharmacy with any questions or concerns at [phone] and we will direct you to the clinical pharmacist covering this patient's care while in-house.    Adonay Frank, ELLIOTTD

## 2023-01-27 NOTE — ED NOTES
TRANSFER - OUT REPORT:    Verbal report given to Arely Cho RN (name) on Tana Hosteller  being transferred to  CVSU (unit) for routine progression of care       Report consisted of patients Situation, Background, Assessment and   Recommendations(SBAR). Information from the following report(s) SBAR, ED Summary, Procedure Summary, Intake/Output, MAR, Recent Results, Alarm Parameters , Procedure Verification, and Quality Measures was reviewed with the receiving nurse. Lines:   PICC Double Lumen 65/52/28 Right;Basilic (Active)   Central Line Being Utilized Yes 01/19/23 1502   Criteria for Appropriate Use Long term IV/antibiotic administration 01/19/23 1502   Site Assessment Clean, dry, & intact 01/19/23 1502   Phlebitis Assessment 0 01/19/23 1502   Infiltration Assessment 0 01/19/23 1502   Arm Circumference (cm) 22 cm 01/12/23 1612   Date of Last Dressing Change 01/19/23 01/19/23 1502   Dressing Status New;Clean, dry, & intact 01/19/23 1502   Action Taken Open ports on tubing capped 01/19/23 1502   External Catheter Length (cm) 0 centimeters 01/12/23 1612   Dressing Type Bacteriocidal;Transparent; Other (comments) 01/19/23 1502   Hub Color/Line Status Red;Flushed; Infusing 01/19/23 1502   Positive Blood Return (Site #1) Yes 01/19/23 1502   Hub Color/Line Status Purple;Flushed;Capped 01/19/23 1502   Positive Blood Return (Site #2) Yes 01/19/23 1502   Alcohol Cap Used Yes 01/19/23 1502       Peripheral IV 01/26/23 Left Antecubital (Active)   Site Assessment Clean, dry, & intact 01/26/23 2128   Phlebitis Assessment 0 01/26/23 2128   Infiltration Assessment 0 01/26/23 2128   Dressing Status Clean, dry, & intact 01/26/23 2128   Dressing Type Transparent;Tape 01/26/23 2128       Peripheral IV 01/27/23 Left Forearm (Active)   Site Assessment Clean, dry, & intact 01/27/23 1014   Phlebitis Assessment 0 01/27/23 1014   Infiltration Assessment 0 01/27/23 1014   Dressing Status Clean, dry, & intact 01/27/23 1014 Opportunity for questions and clarification was provided.       Patient transported with:   Monitor

## 2023-01-27 NOTE — PROGRESS NOTES
Hospitalist Night Cover     Name: Harsha Cedeno  YOB: 1950      Overnight update:        2002 rapid response called for SVT -190s. BP stable. Dr. Gurjit Gay at bedside.   -pads applied  -adenosine 6mg given without success, then 12mg without success  -amio bolus and gtt started, HR now 117, BP stable   -mag and phos pending  -patient transferred to higher level of care, intermediate    -wife present and updated    Patient is at risk for further decompensation and may require a higher level of care.      Satish Pounds AGACNP

## 2023-01-27 NOTE — PROGRESS NOTES
LVAD/TRANSPLANT [address]   Date: 01/27/2023   Committee Members:   Dr. Vel Mancuso MD PhD (Medical Director), Dr. Marce Mora MD (Surgical Director), PRESTON Montana (Chief Nurse Practitioner), Beata Gardner NP ([facility] Nurse Practitioner), Luis Antonio Pendleton RN (VAD Coordinator), Nic Echevarria RN (VAD Coordinator), Jenn Sauer RN (VAD Coordinator), Sasha Francis LCSW ()   Criteria Met:  Chronic systolic heart failure  Stage D, NYHA class IV symptoms  LVEF < 25% by Cardiac  MRI   Not transplant candidate due to age   Inotrope dependence for more than 14 days   Absolute contraindications:  None    Relative contraindications:  Incomplete dental evaluation   Small frame  Malnutrition, BMI 17.72, Cachexia, frailty   Physical deconditioning   Peripheral Vascular Diease   Urinary retention requiring donnelly catheter   Atypical lung infection requiring follow up   Neuroendocrine tumor requiring staging   Heal injury requiring wound care consult  Concern regarding neurocognitive dysfunction   Incomplete workup    Decision:  Defer until 02/03/23 pending further testing and consults    Plan of care discussion:  N/A   Additional comments:  N/A   Prepared by:   Jenn Sauer RN  VAD Coordinator

## 2023-01-28 NOTE — PROGRESS NOTES
Problem: Mobility Impaired (Adult and Pediatric)  Goal: *Acute Goals and Plan of Care (Insert Text)  Description: FUNCTIONAL STATUS PRIOR TO ADMISSION: Patient was modified independent using a rollator for functional mobility. Pt recently d/c home after previous hospital stay. Able to ambulate community distances. HOME SUPPORT PRIOR TO ADMISSION: The patient lived with spouse but did not require assist.    Physical Therapy Goals  Initiated 1/28/2023  1. Patient will move from supine to sit and sit to supine  in bed with modified independence within 7 day(s). 2.  Patient will transfer from bed to chair and chair to bed with modified independence using the least restrictive device within 7 day(s). 3.  Patient will perform sit to stand with modified independence within 7 day(s). 4.  Patient will ambulate with modified independence for 300 feet with the least restrictive device within 7 day(s). 5.  Patient will ascend/descend 12 stairs with 1 handrail(s) with supervision/set-up within 7 day(s). Outcome: Not Met       PHYSICAL THERAPY EVALUATION  Patient: Mary Alfonso (76 y.o. male)  Date: 1/28/2023  Primary Diagnosis: CHF (congestive heart failure) ([facility] [I50.9]       Precautions:   Fall    ASSESSMENT  Based on the objective data described below, the patient presents with decreased activity tolerance, impaired functional mobility, impaired balance. Pt recently d/c'ed and returns due to worsening symptoms of CHF and now being evaluated for LVAD. Pt generally mobilized at a CGA level during today's session. Pt received semi gamboa in bed, on RA, on tele, cath, lifevest, family in room, agreeable to work with therapy. Pt BP soft throughout session and as low as 90/40's and RN aware. Pt participated in ~10 minutes standing activity at side of bed but deferred ambulation trials secondary to low BP. Pt's HR as high as 130 with activity.  Will continue to follow while acute for ambulation trials and stair training, increase functional mobility and activity tolerance, and decrease caregiver burden. Pt likely to d/c home with HHPT pending progress and medical progression. Current Level of Function Impacting Discharge (mobility/balance): CGA for mobility, requires stair training and gait assessment    Functional Outcome Measure: The patient scored 38/56 on the stephen outcome measure which is indicative of moderate fall risk. Other factors to consider for discharge: PLOF, recent hospitalization      Patient will benefit from skilled therapy intervention to address the above noted impairments. PLAN :  Recommendations and Planned Interventions: bed mobility training, transfer training, gait training, therapeutic exercises, neuromuscular re-education, patient and family training/education, and therapeutic activities      Frequency/Duration: Patient will be followed by physical therapy:  5 times a week to address goals. Recommendation for discharge: (in order for the patient to meet his/her long term goals)  Physical therapy at least 2 days/week in the home     This discharge recommendation:  Has not yet been discussed the attending provider and/or case management    IF patient discharges home will need the following DME: ? Shower chair, pt stated he was not sure if one was already being procured for him by family so follow up required         SUBJECTIVE:   Patient stated Ginette garcia.     OBJECTIVE DATA SUMMARY:   HISTORY:    Past Medical History:   Diagnosis Date    CAD (coronary artery disease) 11/10/2016    NSTEMI & 2 stents    Deafness 10/28/2012    DM (diabetes mellitus) ([facility]     Elevated cholesterol     Hypertension     NSTEMI (non-ST elevated myocardial infarction) ([facility] 11/10/2016     Past Surgical History:   Procedure Laterality Date    COLONOSCOPY N/A 6/28/2018    COLONOSCOPY performed by Kaela Burton MD at [facility] ENDOSCOPY    COLONOSCOPY N/A 1/18/2023    COLONOSCOPY performed by Audrey Rodríguez MD at [address] [place] [address] ENDOSCOPY    HX APPENDECTOMY      DC UNLISTED PROCEDURE CARDIAC SURGERY  2016    2 stents       Personal factors and/or comorbidities impacting plan of care: [place]: Private residence  # Steps to Enter: 12  Rails to Enter: Yes  Hand Rails : Left  One/Two Story Residence: Two story  # of Interior Steps: 12  Living Alone: No  Support Systems: Spouse/Significant Other, Child(sebastián)  Patient Expects to be Discharged to[de-identified] Home  Current DME Used/Available at Home: Stacy Manifold, rollator  Tub or Shower Type: Shower    EXAMINATION/PRESENTATION/DECISION MAKING:   Critical Behavior:  Neurologic State: Alert  Orientation Level: Oriented X4  Cognition: Appropriate decision making, Appropriate for age attention/concentration, Appropriate safety awareness, Follows commands     Hearing: Auditory  Auditory Impairment: None    Range Of Motion:  AROM: Generally decreased, functional           PROM: Generally decreased, functional           Strength:    Strength: Generally decreased, functional             Functional Mobility:  Bed Mobility:     Supine to Sit: Contact guard assistance  Sit to Supine: Contact guard assistance  Scooting: Modified independent  Transfers:  Sit to Stand: Contact guard assistance  Stand to Sit: Contact guard assistance                       Balance:   Sitting: Intact; Without support  Standing: Impaired; Without support  Standing - Static: Good  Standing - Dynamic : Fair    Functional Measure:  Ruiz Balance Test:    Sitting to Standing: 3  Standing Unsupported: 3  Sitting with Back Unsupported: 4  Standing to Sitting: 3  Transfers: 3  Standing Unsupported with Eyes Closed: 3  Standing Unsupported with Feet Together: 1  Reach Forward with Outstretched Arm: 4   Object: 3  Turn to Look Over Shoulders: 3  Turn 360 Degrees: 2  Alternate Foot on Step/Stool: 2  Standing Unsupported One Foot in Front: 2  Stand on One Le  Total: 38/56         56=Maximum possible score; 0-20=High fall risk  21-40=Moderate fall risk   41-56=Low fall risk                Physical Therapy Evaluation Charge Determination   History Examination Presentation Decision-Making   HIGH Complexity :3+ comorbidities / personal factors will impact the outcome/ POC  HIGH Complexity : 4+ Standardized tests and measures addressing body structure, function, activity limitation and / or participation in recreation  MEDIUM Complexity : Evolving with changing characteristics  Other outcome measures stephen  MEDIUM      Based on the above components, the patient evaluation is determined to be of the following complexity level: MEDIUM    Pain Rating:  Pt did not verbalize pain during session. Activity Tolerance:   Fair, tolerates ADLs without rest breaks, and SpO2 stable on RA    After treatment patient left in no apparent distress:   Supine in bed, Call bell within reach, Caregiver / family present, and Side rails x 3    COMMUNICATION/EDUCATION:   The patients plan of care was discussed with: Registered nurse. Fall prevention education was provided and the patient/caregiver indicated understanding., Patient/family have participated as able in goal setting and plan of care. , and Patient/family agree to work toward stated goals and plan of care.     Thank you for this referral.  Esther Wu, PT   Time Calculation: 16 mins

## 2023-01-28 NOTE — PROGRESS NOTES
Problem: Diabetes Self-Management  Goal: *Disease process and treatment process  Description: Define diabetes and identify own type of diabetes; list 3 options for treating diabetes. Outcome: Progressing Towards Goal  Goal: *Incorporating nutritional management into lifestyle  Description: Describe effect of type, amount and timing of food on blood glucose; list 3 methods for planning meals. Outcome: Progressing Towards Goal  Goal: *Incorporating physical activity into lifestyle  Description: State effect of exercise on blood glucose levels. Outcome: Progressing Towards Goal  Goal: *Developing strategies to promote health/change behavior  Description: Define the ABC's of diabetes; identify appropriate screenings, schedule and personal plan for screenings. Outcome: Progressing Towards Goal  Goal: *Using medications safely  Description: State effect of diabetes medications on diabetes; name diabetes medication taking, action and side effects. Outcome: Progressing Towards Goal  Goal: *Monitoring blood glucose, interpreting and using results  Description: Identify recommended blood glucose targets  and personal targets. Outcome: Progressing Towards Goal  Goal: *Prevention, detection, treatment of acute complications  Description: List symptoms of hyper- and hypoglycemia; describe how to treat low blood sugar and actions for lowering  high blood glucose level. Outcome: Progressing Towards Goal  Goal: *Prevention, detection and treatment of chronic complications  Description: Define the natural course of diabetes and describe the relationship of blood glucose levels to long term complications of diabetes. Outcome: Progressing Towards Goal     Problem: Pressure Injury - Risk of  Goal: *Prevention of pressure injury  Description: Document Mike Scale and appropriate interventions in the flowsheet.   Outcome: Progressing Towards Goal  Note: Pressure Injury Interventions:  Sensory Interventions: Assess changes in LOC, Maintain/enhance activity level, Keep linens dry and wrinkle-free, Minimize linen layers    Moisture Interventions: Absorbent underpads, Limit adult briefs, Minimize layers    Activity Interventions: Assess need for specialty bed, Increase time out of bed    Mobility Interventions: Chair cushion, HOB 30 degrees or less    Nutrition Interventions: Document food/fluid/supplement intake, Offer support with meals,snacks and hydration    Friction and Shear Interventions: Apply protective barrier, creams and emollients, Minimize layers

## 2023-01-28 NOTE — CONSULTS
GI Consultation Note Alexia Armstrong : 1950 MRN: [id number]   ATTG: Sheyla Baumann          PCP: Saman Salinas MD  Date/Time:  2023 2:14 PM  Subjective:   REASON FOR CONSULT:        Cleveland Broussard is a 71 y.o. male who I was asked to see for follow-up of biopsy results for procedures done by Dr. Henri Retana as he is undergoing LVAD work-up. Family at bedside during my visit. Colonoscopy Procedure Note     Indications:   See Preoperative Diagnosis above  Referring Physician: Saman Salinas MD  Anesthesia/Sedation: MAC anesthesia Propofol  Endoscopist:  Dr. Gui Thompson  Assistant:  Endoscopy Technician-1: Bereket Milton IV  Endoscopy RN-1: Maria C Walton RN  Preoperative diagnosis: LVAD Work up  Postoperative diagnosis: Gastric erythema  Gastric polyp     Procedure in Detail:  Informed consent was obtained for the procedure, including sedation. Risks of perforation, hemorrhage, adverse drug reaction, and aspiration were discussed. The patient was placed in the left lateral decubitus position. Based on the pre-procedure assessment, including review of the patient's medical history, medications, allergies, and review of systems, he had been deemed to be an appropriate candidate for  sedation; he was therefore sedated with the medications listed above. The patient was monitored continuously with ECG tracing, pulse oximetry, blood pressure monitoring, and direct observations. A rectal examination was performed. The [id number] was inserted into the rectum and advanced under direct vision to the cecum, which was identified by the ileocecal valve and appendiceal orifice. The quality of the colonic preparation was good. A careful inspection was made as the colonoscope was withdrawn, including a retroflexed view of the rectum; findings and interventions are described below. Appropriate photodocumentation was obtained.      Findings:  Rectum: normal  Sigmoid: normal  Descending Colon: normal  Transverse Colon: normal  Ascending Colon: normal  Cecum: normal    Esophagogastroduodenoscopy (EGD) Procedure Note     :  Fede Love MD     Staff: Endoscopy Technician-1: Marcy Beltran IV  Endoscopy RN-1: Michael Hernandez RN      Referring Provider:   Moni Soria MD     Anethesia/Sedation:  MAC anesthesia Propofol     Preoperative diagnosis: LVAD Work up     Postoperative diagnosis: Gastric erythema  Gastric polyp     Procedure Details      After infom consent was obtained for the procedure, with all risks and benefits of procedure explained the patient was taken to the endoscopy suite and placed in the left lateral decubitus position. Following sequential administration of sedation as per above, the [id number] gastroscope was inserted into the mouth and advanced under direct vision to second portion of the duodenum. A careful inspection was made as the gastroscope was withdrawn, including a retroflexed view of the proximal stomach; findings and interventions are described below. Findings:  Esophagus:normal  Stomach:Patchy erythema gastric body, biopsies done. 6 mm sessile polyp gastric body biopsied  Duodenum/jejunum:normal        Therapies:  none     Specimens: gastric body bx, gastric polyp bx           EBL: None     Complications:   None; patient tolerated the procedure well. Impression:    See Postoperative diagnosis above     Recommendations:  -Await pathology. , -Cardiac diet      EGD Pathology (1/18/23):    1. Stomach, body; biopsy:        Well-differentiated neuroendocrine tumor, G1; (see comment). Chronic active gastritis with intestinal metaplasia; no epithelial   dysplasia noted. No H. pylori-type organisms identified on H&E and IHC stained sections. 2. Stomach, polyp; polypectomy:        Well-differentiated neuroendocrine tumor, G1; (see comment).         Chronic active gastritis with intestinal metaplasia, surface erosion   and reactive foveolar hyperplasia; no epithelial dysplasia identified. No H. pylori-type organisms identified on H&E and IHC stained sections. EGD/Colon biopsy (6/28/18):  1. Stomach, biopsy:        Atrophic gastritis with neuroendocrine hyperplasia   Intestinal metaplasia with no evidence of dysplasia   See comment     2. Colon, transverse, polypectomy:        Tubular adenoma           Past Medical History:   Diagnosis Date    CAD (coronary artery disease) 11/10/2016    NSTEMI & 2 stents    Deafness 10/28/2012    DM (diabetes mellitus) ([facility]     Elevated cholesterol     Hypertension     NSTEMI (non-ST elevated myocardial infarction) ([facility] 11/10/2016      Past Surgical History:   Procedure Laterality Date    COLONOSCOPY N/A 6/28/2018    COLONOSCOPY performed by Reinaldo Cota MD at [address] [place] [address] N/A 1/18/2023    COLONOSCOPY performed by Chris Robertson MD at [facility] ENDOSCOPY    [address]  11/11/2016    2 stents     Social History     Tobacco Use    Smoking status: Never     Passive exposure: Never    Smokeless tobacco: Never   Substance Use Topics    Alcohol use: Yes     Alcohol/week: 2.0 standard drinks     Types: 1 Cans of beer, 1 Shots of liquor per week     Comment: rarely      Family History   Problem Relation Age of Onset    Heart Disease Father     Heart Attack Father     Hypertension Mother     Elevated Lipids Brother     Elevated Lipids Brother     No Known Problems Sister     Elevated Lipids Brother     No Known Problems Son     No Known Problems Daughter     Anesth Problems Neg Hx       No Known Allergies   Home Medications:  Prior to Admission Medications   Prescriptions Last Dose Informant Patient Reported? Taking? Januvia 50 mg tablet   No Yes   Sig: Take 1 tablet by mouth once daily   aspirin delayed-release 81 mg tablet   Yes Yes   Sig: Take 81 mg by mouth daily.    ergocalciferol (ERGOCALCIFEROL) 1,250 mcg (50,000 unit) capsule   No Yes   Sig: Take 1 Capsule by mouth every seven (7) days. Patient taking differently: Take 50,000 Units by mouth every seven (7) days.    finasteride (PROSCAR) 5 mg tablet   No Yes   Sig: Take 1 Tablet by mouth daily (with dinner) for 30 days. furosemide (LASIX) 20 mg tablet   No Yes   Sig: Take 1 Tablet by mouth daily as needed (for weight greater than 120 lb by 2-3 lb or shortness of breath). glipiZIDE (GLUCOTROL) 5 mg tablet   No Yes   Sig: Take 1 tablet by mouth twice daily   ipratropium (ATROVENT) 21 mcg (0.03 %) nasal spray   No Yes   Si Sprays by Both Nostrils route every twelve (12) hours. metFORMIN (GLUCOPHAGE) 500 mg tablet   No Yes   Sig: Take 1 Tablet by mouth two (2) times daily (with meals) for 30 days. milrinone (PRIMACOR) 20 mg/100 mL infusion   Yes Yes   Si.2 mcg/kg/min by IntraVENous route continuous. infuse at 0.2mcg/kg/min with dosage weight of 52kgs continuously   nitroglycerin (NITROSTAT) 0.4 mg SL tablet   No No   Si Tablet by SubLINGual route every five (5) minutes as needed for Chest Pain. Up to 3 doses. pantoprazole (PROTONIX) 40 mg tablet   No Yes   Sig: Take 1 Tablet by mouth Daily (before breakfast) for 30 days. polyethylene glycol (Miralax) 17 gram/dose powder   Yes Yes   Sig: Take 17 g by mouth daily as needed for Constipation. rosuvastatin (CRESTOR) 20 mg tablet   No Yes   Sig: Take 1 Tablet by mouth nightly. zolpidem (AMBIEN) 5 mg tablet   No No   Sig: Take 1 Tablet by mouth nightly as needed for Sleep. Max Daily Amount: 5 mg.       Facility-Administered Medications: None     Hospital medications:  Current Facility-Administered Medications   Medication Dose Route Frequency    potassium chloride SR (KLOR-CON 10) tablet 20 mEq  20 mEq Oral BID    potassium chloride SR (KLOR-CON 10) tablet 20 mEq  20 mEq Oral ONCE    cefTRIAXone (ROCEPHIN) 1 g in 0.9% sodium chloride 10 mL IV syringe  1 g IntraVENous Q24H    doxycycline (VIBRAMYCIN) 100 mg in 0.9% sodium chloride (MBP/ADV) 100 mL MBP  100 mg IntraVENous Q12H    metoprolol succinate (TOPROL-XL) XL tablet 12.5 mg  12.5 mg Oral Q12H    lidocaine 4 % patch 1 Patch  1 Patch TransDERmal Q24H    heparin 25,000 units in D5W 250 ml infusion  12-25 Units/kg/hr IntraVENous TITRATE    amiodarone (CORDARONE) tablet 400 mg  400 mg Oral BID    aspirin delayed-release tablet 81 mg  81 mg Oral DAILY    sodium chloride (NS) flush 5-40 mL  5-40 mL IntraVENous Q8H    sodium chloride (NS) flush 5-40 mL  5-40 mL IntraVENous PRN    acetaminophen (TYLENOL) tablet 650 mg  650 mg Oral Q6H PRN    Or    acetaminophen (TYLENOL) suppository 650 mg  650 mg Rectal Q6H PRN    polyethylene glycol (MIRALAX) packet 17 g  17 g Oral DAILY PRN    ondansetron (ZOFRAN ODT) tablet 4 mg  4 mg Oral Q8H PRN    Or    ondansetron (ZOFRAN) injection 4 mg  4 mg IntraVENous Q6H PRN    furosemide (LASIX) injection 40 mg  40 mg IntraVENous BID    heparin (porcine) pf 500 Units  500 Units InterCATHeter PRN    pantoprazole (PROTONIX) tablet 40 mg  40 mg Oral ACB    rosuvastatin (CRESTOR) tablet 20 mg  20 mg Oral QHS    zolpidem (AMBIEN) tablet 5 mg  5 mg Oral QHS PRN    milrinone (PRIMACOR) 20 MG/100 ML D5W infusion  0.2 mcg/kg/min IntraVENous CONTINUOUS    heparin (porcine) pf 500 Units  500 Units InterCATHeter DAILY     REVIEW OF SYSTEMS:     []     Unable to obtain  ROS due to  []    mental status change  []    sedated   []    intubated   []    Total of 11 systems reviewed as follows:  Const:  negative fever, negative chills, negative weight loss  Eyes:   negative diplopia or visual changes, negative eye pain  ENT:   negative coryza, negative sore throat  Resp:   negative cough, hemoptysis, dyspnea  Cards:  negative for chest pain, palpitations, lower extremity edema  :  negative for frequency, dysuria and hematuria  Skin:   negative for rash and pruritus  Heme:  negative for easy bruising and gum/nose bleeding  MS:  negative for myalgias, arthralgias, back pain and muscle weakness  Neurolo:  negative for headaches, dizziness, vertigo, memory problems   Psych:  negative for feelings of anxiety, depression     Pertinent Positives include :    Objective:   VITALS:    Visit Vitals  BP (!) 111/43   Pulse (!) 112   Temp 98.6 °F (37 °C)   Resp 22   Ht 5' 9\" (1.753 m)   Wt 54.4 kg (120 lb)   SpO2 98%   BMI 17.72 kg/m²     Temp (24hrs), Av.2 °F (37.3 °C), Min:98.6 °F (37 °C), Max:100.7 °F (38.2 °C)        LAB DATA REVIEWED:      IMAGING RESULTS:  Reviewed. Assessment/Plan:    71 y.o. male undergoing LVAD work-up. ___________________________________________________  RECOMMENDATIONS:    Dr. Kilo Toussaint to resume care on Monday. Can discuss biopsy results at that time.      MBS  ________________________________  Care Plan discussed with:    [x]    Patient   [x]    Family   [x]    Nursing   []    Attending   ___________________________________________________  GI: Portillo Jaeger DO

## 2023-01-28 NOTE — PROGRESS NOTES
[address] [facility]  Hospitalist Group                                                                                          Hospitalist Progress Note  Law Tyler MD  Answering service: [phone] OR 36 from in house phone        Date of Service:  2023  NAME:  Emiliano Valadez  :  1950  MRN:  [id number]       Admission Summary:   HPI: \"Neal Kendall is a 71 y.o. male with history of ischemic cardiomyopathy, CAD status post PCI x2 HFrEF EF 25-30 stage D, NYHA class IIIb recently placed on milrinone drip as bridge to LVAD therapy, type 2 diabetes, hypertension, dyslipidemia, BPH, dyslipidemia, TRISTAN, CKD stage III who presents to hospital with complaints of elevated heart rate, difficulty with urination. Patient and wife state he had felt only mild improvement since discharge from hospital after his recent hospitalization. He was scheduled for outpatient voiding trial with urology and had Vasquez catheter removed. Wife states patient was able to void about 140 mL. Wife states since discharge, she has noted orthopnea, persistent tachycardia with heart rates in 120s and low BPs with systolics in the 36Z as well as bilateral lower extremity edema. Patient however states he has not experienced any significant breathing issues. The patient denies any fever, chills, chest or abdominal pain, nausea, vomiting, cough, congestion, recent illness, palpitations, or dysuria. Remarkable vitals on ER Presentation: Heart rate 126  Labs Remarkable for: HGB 8.6, TROP 2033, glu 198, cr 1.36, bnp   ER Images: cxr: New diffuse bilateral increased interstitial markings, partially  obscuring bilateral pulmonary nodules. CT can be performed for further  evaluation, as indicated. \"       Interval history / Subjective:   Patient seen examined at bedside earlier. Aflutter with rvr yesterday, abated with amio bolus still on amio gtt.  Fever 100.7 overnight      Assessment & Plan:      Decompensated HFrEF LVEF 25-30% / Ischemic Cardiomyopathy / Milrinone gtt  CAD s/p CABG x2  Aflutter RVR   NSTEMI  -Continue amiodarone po and gtt . Appreciate EP, cardio and AHF  -cont milrinone gtt   -Continue diuresis with Lasix 40 mg IV twice daily  -stopped dig   -heparin gtt, per cardio no need for rpt cath   -Follow lytes goal K greater than 4, mag greater than 2  -Strict I's and O's  - toprol 12.5 bid, cannot tolerate ACE/ARB due to hypotension, cannot tolerate spironolactone secondary to hyperkalemia, cannot tolerate Jardiance due to significant diuresis lower doses  -stopped corlanor due to sv  On lifevest   -PTOT  -lvad workup per AHF    Fever  CAP  100.7 1/28  - CT chest 1/28 shows diffuse focal opacities concerning for pneumonia  obtain blood cultures, UA   - COVID-negative test for flu  - Start Rocephin, Doxy treat for 5 days    BPH with urinary retention  -Failed recent voiding trial  -donnelly in place   -urology consulted, awaiting eval      CKD stage III   -Stable. Monitor with IV diuresis  GERD -chronic Continue PPI     PAD  / S/p Right SFA PTCA -followed by Dr. Ramez Pearson   -Normal ABIs on recent admission     Critically ill, extremely high risk for decompensation.   CCT time 45 minutes    Spoke to patient wife at bedside updated on plan    Code status: full   Prophylaxis: scd  Care Plan discussed with: patient/family at bedside, nurse, case management  Anticipated Disposition: Greater than 48 hours   Cardiac monitoring: xTelemetry      Hospital Problems  Date Reviewed: 1/24/2023            Codes Class Noted POA    [facility] ICD-10-CM: I50.9  ICD-9-CM: 428.0  12/12/2022 Unknown         Social Determinants of Health     Tobacco Use: Low Risk     Smoking Tobacco Use: Never    Smokeless Tobacco Use: Never    Passive Exposure: Never   Alcohol Use: Not on file   Financial Resource Strain: Medium Risk    Difficulty of Paying Living Expenses: Somewhat hard   Food Insecurity: Food Insecurity Present    Worried About Running Out of Food in the Last Year: Never true    Ran Out of Food in the Last Year: Often true   Transportation Needs: Not on file   Physical Activity: Not on file   Stress: Not on file   Social Connections: Not on file   Intimate Partner Violence: Not on file   Depression: Not at risk    PHQ-2 Score: 0   Housing Stability: Not on file       Review of Systems:   A comprehensive review of systems was negative except for that written in the HPI. Vital Signs:    Last 24hrs VS reviewed since prior progress note. Most recent are:  Visit Vitals  BP (!) 111/43   Pulse (!) 108   Temp 98.6 °F (37 °C)   Resp 22   Ht 5' 9\" (1.753 m)   Wt 54.4 kg (120 lb)   SpO2 98%   BMI 17.72 kg/m²         Intake/Output Summary (Last 24 hours) at 1/28/2023 1316  Last data filed at 1/28/2023 3916  Gross per 24 hour   Intake 1564.41 ml   Output 3000 ml   Net -1435.59 ml          Physical Examination:     I had a face to face encounter with this patient and independently examined them on 1/28/2023 as outlined below:          Constitutional:  No acute distress, cooperative, pleasant    ENT:  Oral mucosa moist, oropharynx benign. Resp:  Crackles bl bases. No wheezing/rhonchi/rales. No accessory muscle use. CV:  Regular rhythm, normal rate, no murmurs, gallops, rubs    GI:  Soft, non distended, non tender. normoactive bowel sounds, no hepatosplenomegaly     Musculoskeletal:  trace edema, warm, 2+ pulses throughout    Neurologic:  Moves all extremities.   AAOx3, CN II-XII reviewed            Data Review:    Review and/or order of clinical lab test  Review and/or order of tests in the radiology section of CPT  Review and/or order of tests in the medicine section of CPT    I have independently reviewed and interpreted patient's lab and all other diagnostic data    Labs:     Recent Labs     01/28/23  0526 01/27/23  0052   WBC 6.3 6.7   HGB 8.0* 9.2*   HCT 26.5* 29.9*    229       Recent Labs 01/28/23  0526 01/27/23  1448 01/27/23 0052 01/26/23 2038 01/26/23 2038 01/26/23  1651   *  --  137  --   --  136   K 3.3*  --  3.9  --   --  4.6     --  105  --   --  106   CO2 24  --  23  --   --  21   BUN 17  --  16  --   --  19   CREA 1.48*  --  1.15  --   --  1.36*   *  --  237*  --   --  198*   CA 8.7  --  8.9  --   --  9.3   MG 1.7 2.2 1.3*   < > 1.5*  --    PHOS  --   --   --   --  2.6  --     < > = values in this interval not displayed. Recent Labs     01/27/23 0052 01/26/23  1651   ALT 23 27   * 128*   TBILI 0.5 0.4   TP 6.6 7.0   ALB 3.0* 3.2*   GLOB 3.6 3.8       Recent Labs     01/27/23  1434   APTT 51.7*        No results for input(s): FE, TIBC, PSAT, FERR in the last 72 hours. Lab Results   Component Value Date/Time    Folate 10.5 07/03/2018 09:24 AM        No results for input(s): PH, PCO2, PO2 in the last 72 hours. No results for input(s): CPK, CKNDX, TROIQ in the last 72 hours.     No lab exists for component: CPKMB  Lab Results   Component Value Date/Time    Cholesterol, total 170 01/12/2023 05:00 AM    HDL Cholesterol 40 01/12/2023 05:00 AM    LDL, calculated 87 01/12/2023 05:00 AM    Triglyceride 215 (H) 01/12/2023 05:00 AM    CHOL/HDL Ratio 4.3 01/12/2023 05:00 AM     Lab Results   Component Value Date/Time    Glucose (POC) 265 (H) 01/27/2023 09:21 PM    Glucose (POC) 329 (H) 01/19/2023 11:33 AM    Glucose (POC) 311 (H) 01/19/2023 10:54 AM    Glucose (POC) 207 (H) 01/19/2023 06:58 AM    Glucose (POC) 304 (H) 01/18/2023 10:24 PM     Lab Results   Component Value Date/Time    Color YELLOW/STRAW 01/27/2023 11:35 AM    Appearance CLEAR 01/27/2023 11:35 AM    Specific gravity 1.010 01/27/2023 11:35 AM    Specific gravity 1.013 01/01/2023 09:13 AM    pH (UA) 6.0 01/27/2023 11:35 AM    Protein Negative 01/27/2023 11:35 AM    Glucose 500 (A) 01/27/2023 11:35 AM    Ketone Negative 01/27/2023 11:35 AM    Bilirubin Negative 01/27/2023 11:35 AM    Urobilinogen 0.2 01/27/2023 11:35 AM    Nitrites Negative 01/27/2023 11:35 AM    Leukocyte Esterase TRACE (A) 01/27/2023 11:35 AM    Epithelial cells FEW 01/27/2023 11:35 AM    Bacteria Negative 01/27/2023 11:35 AM    WBC 0-4 01/27/2023 11:35 AM    RBC 10-20 01/27/2023 11:35 AM       Notes reviewed from all clinical/nonclinical/nursing services involved in patient's clinical care. Care coordination discussions were held with appropriate clinical/nonclinical/ nursing providers based on care coordination needs. Patients current active Medications were reviewed, considered, added and adjusted based on the clinical condition today. Home Medications were reconciled to the best of my ability given all available resources at the time of admission. Route is PO if not otherwise noted.       Medications Reviewed:     Current Facility-Administered Medications   Medication Dose Route Frequency    potassium chloride SR (KLOR-CON 10) tablet 20 mEq  20 mEq Oral BID    potassium chloride SR (KLOR-CON 10) tablet 20 mEq  20 mEq Oral ONCE    cefTRIAXone (ROCEPHIN) 1 g in 0.9% sodium chloride 10 mL IV syringe  1 g IntraVENous Q24H    doxycycline (VIBRAMYCIN) 100 mg in 0.9% sodium chloride (MBP/ADV) 100 mL MBP  100 mg IntraVENous Q12H    metoprolol succinate (TOPROL-XL) XL tablet 12.5 mg  12.5 mg Oral Q12H    lidocaine 4 % patch 1 Patch  1 Patch TransDERmal Q24H    heparin 25,000 units in D5W 250 ml infusion  12-25 Units/kg/hr IntraVENous TITRATE    amiodarone (CORDARONE) tablet 400 mg  400 mg Oral BID    aspirin delayed-release tablet 81 mg  81 mg Oral DAILY    sodium chloride (NS) flush 5-40 mL  5-40 mL IntraVENous Q8H    sodium chloride (NS) flush 5-40 mL  5-40 mL IntraVENous PRN    acetaminophen (TYLENOL) tablet 650 mg  650 mg Oral Q6H PRN    Or    acetaminophen (TYLENOL) suppository 650 mg  650 mg Rectal Q6H PRN    polyethylene glycol (MIRALAX) packet 17 g  17 g Oral DAILY PRN    ondansetron (ZOFRAN ODT) tablet 4 mg  4 mg Oral Q8H PRN Or    ondansetron (ZOFRAN) injection 4 mg  4 mg IntraVENous Q6H PRN    furosemide (LASIX) injection 40 mg  40 mg IntraVENous BID    heparin (porcine) pf 500 Units  500 Units InterCATHeter PRN    pantoprazole (PROTONIX) tablet 40 mg  40 mg Oral ACB    rosuvastatin (CRESTOR) tablet 20 mg  20 mg Oral QHS    zolpidem (AMBIEN) tablet 5 mg  5 mg Oral QHS PRN    milrinone (PRIMACOR) 20 MG/100 ML D5W infusion  0.2 mcg/kg/min IntraVENous CONTINUOUS    heparin (porcine) pf 500 Units  500 Units InterCATHeter DAILY     ______________________________________________________________________  EXPECTED LENGTH OF STAY: - - -  ACTUAL LENGTH OF STAY:          2                 Harjit Hutchins MD

## 2023-01-28 NOTE — PROGRESS NOTES
[facility] in [place], [place]  Inpatient Progress Note      Patient name: Lindy Vincent  Patient : 1950  Patient MRN: [id number]  Consulting MD: Joana Robertson MD  Date of service: 23    REASON FOR REFERRAL:  Management of chronic systolic heart failure     PLAN OF CARE:  70 y/o male with likely combined non-ischemic and ischemic cardiomyopathy, LVEF 25-30%, stage D, NYHA class IIIB/IV; initiated on home milrinone gtt as bridge to completion of LVAD workup  Most likely etiology of cardiomyopathy: CAD +/- TRISTAN +/- inappropriate sinus tach +/- undergoing workup  Patient presented with symptomatic SVT (a-flutter) with RVR converted to sinus tach after amio loading  Plan to continue amiodarone and beta-blockers, EP consultation, complete remainder of LVAD workup while inpatient and discuss at [facility]; now with concern for PNA, r/o bacteremia, treatment per hospitalist      INTERVAL HISTORY:  Slightly more tachycardic yesterday evening with -130's  BP stable  Tmax 100.7   Net -2.3L, pBNP downtrending      RECOMMENDATIONS:  Continue milrinone  0.2mcg/kg/min unable to uptitrate due to HR and BP  Continue toprol XL 12.5mg BID  Cannot tolerate ARB/ARNi due to hypotension  Of note Flomax was held and BP responded well   Cannot tolerate spironolactone due to hyperkalemia  Cannot tolerate jardiance 10mg daily due to significant diuresis on smallest dose/contributed to IVVD  Discontinued corlanor due to SVT  Digoxin stopped d/t risk for toxicity with amio loading  Continue amiodarone, appreciate EP cardiology recs  Resume diuresis with lasix 40mg IV BID  Potassium 20mEq BID, additional 20mEq this morning  Keep K+ >4 and Mg >2  Abx/treatment of suspected PNA per hospitalist  Continue lifevest ordered  Continue baby aspirin   Plavix previously stopped, okayed by Dr. Jose Sinclair consult pending (high risk for TRISTAN)  VAD evaluation in progress, will need PFT/DLCO, maxillary CT with contrast, urology consult, sleep medicine consult  CAV cardiology consulted; appreciate their input     All other care per primary team, hopefully home end of the week       IMPRESSION:  Acute on chronic combined systolic/diastolic  heart failure  Stage D, NYHA class IIIB/IV symptoms   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, LVEF 25-30% and 23% (by cMRI 1/3/23)  Cardiac MRI suggestive of ischemic cardiomyopathy  PYP equivocal  Chronic milrinone infusion as palliation   Coronary artery disease  CAD s/p CABG x 2: further disease best managed medically due to small vessel size   At risk of sudden cardiac death  Peripheral arterial disease  Bilateral hydronephrosis s/p donnelly  Cardiac risk factors:  HTN  HL  TRISTAN, STOP-BANG 4  DM2  CKD, stage 3  MOCA from 1/4/22 17/30, consistent with mild cognitive impairment  Hard of hearing  Full code         LIFE GOALS:  Lifestyle goals reviewed with the patient. Patient's personal goals include: TBD  Important upcoming milestones or family events: TBD  The patient identifies the following as important for living well: TBD                CARDIAC IMAGING:  Echo 1/9/23   Left Ventricle: Severely reduced left ventricular systolic function with a visually estimated EF of 25 - 30%. Left ventricle size is normal. Mildly increased wall thickness. Echo 12/26/22    Left Ventricle: Severely reduced left ventricular systolic function with a visually estimated EF of 25 - 30%. Left ventricle size is normal. Normal wall thickness. There are regional wall motion abnormalities. Grade II diastolic dysfunction with increased LAP. Right Ventricle: Moderately reduced systolic function. TAPSE is abnormal. TAPSE is 1.1 cm. Aortic Valve: Mild stenosis of the aortic valve. AV peak gradient is 13 mmHg. AV peak velocity is 1.8 m/s. Mitral Valve: Not well visualized. Moderate annular calcification at the posterior leaflet of the mitral valve. Mild to moderate regurgitation. Tricuspid Valve: Mildly elevated RVSP. Left Atrium: Left atrium is moderately dilated. 12/8/22    Left Ventricle: Moderately reduced left ventricular systolic function with a visually estimated EF of 35 - 40%. Severe hypokinesis of the following segments: mid anteroseptal, apical anterior, apical septal, apical inferior and apical lateral. Severe hypokinesis of the apex. Mitral Valve: Severely thickened leaflet, at the anterior and posterior leaflets. Severely calcified leaflet, at the anterior and posterior leaflets. Mild annular calcification of the mitral valve. Moderate regurgitation. Left Atrium: Left atrium is mildly dilated. Contrast used: Definity. limited study     EKG 12/22/22 ST, Biatria enlargement, marked ST abnormality     LHC 12/6/22  1. Normal LVEDP  2. Severe native multivessel coronary artery disease  3. Patent LIMA to LAD and vein graft to distal RCA  4. Recurrent ISR in OM1 stent with now 60 to 70% restenosis  5. Recoil of left main and circumflex stent with now recurrent 40 to 50% stenosis. 6.  Progression of ostial left main disease now to about 60% stenosis  7. Progression of disease in jailed first marginal branch now with diffuse 90% stenosis  8. High-grade stenosis in the mid to distal right potential femoral artery treated with 6 x 40 mm impact drug-coated balloon angioplasty to reduce the stenosis to less than 40%     NST        HEMODYNAMICS:  RHC 1/9/23  PA 20/9, RA 3, PCWP 8, CI 1.8     CPEST too ill   6MW 300 feet       HPI:  71 y.o. male who was admitted via ED for worsening SOB, poor appetite, orthopnea and fatigue. Pmhx of CAD s/p CABG, PAD, DM 2, recent admission for HFmrEF earlier this month. Serial TTEs show progressive decline in EF since 3/2021 with the most recent EF at 25-30%. Recent LHC shows diffuse CAD and no interventions indicated.  Patient had [address] recently and there is some thought to myocarditis or other etiology causing worsening HF. [facility] was consulted for further evaluation and management of HFrEF. REVIEW OF SYSTEMS:  Review of Systems   Constitutional:  Positive for fever and malaise/fatigue. Negative for chills. Respiratory:  Positive for shortness of breath. Negative for cough. Cardiovascular:  Positive for leg swelling. Negative for chest pain, palpitations and orthopnea. Gastrointestinal:  Negative for abdominal pain, heartburn, nausea and vomiting. Neurological:  Positive for weakness. Negative for dizziness. PHYSICAL EXAM:  Visit Vitals  BP (!) 104/46 (BP 1 Location: Left upper arm, BP Patient Position: At rest)   Pulse (!) 102   Temp 98.6 °F (37 °C)   Resp 22   Ht 5' 9\" (1.753 m)   Wt 120 lb (54.4 kg)   SpO2 98%   BMI 17.72 kg/m²     Physical Exam  Vitals and nursing note reviewed. Constitutional:       General: He is not in acute distress. Appearance: Normal appearance. He is ill-appearing. Cardiovascular:      Rate and Rhythm: Regular rhythm. Tachycardia present. Heart sounds: Normal heart sounds. No murmur heard. No friction rub. No gallop. Pulmonary:      Effort: Pulmonary effort is normal. No respiratory distress. Breath sounds: Normal breath sounds. Abdominal:      General: Bowel sounds are normal. There is no distension. Palpations: Abdomen is soft. Tenderness: There is no abdominal tenderness. Musculoskeletal:      Right lower le+ Pitting Edema present. Left lower le+ Pitting Edema present. Skin:     General: Skin is warm and dry. Capillary Refill: Capillary refill takes less than 2 seconds. Neurological:      General: No focal deficit present. Mental Status: He is alert and oriented to person, place, and time. Psychiatric:         Mood and Affect: Mood normal.         Behavior: Behavior normal.         Thought Content:  Thought content normal.         Judgment: Judgment normal.            PAST MEDICAL HISTORY:  Past Medical History:   Diagnosis Date    CAD (coronary artery disease) 11/10/2016    NSTEMI & 2 stents    Deafness 10/28/2012    DM (diabetes mellitus) ([facility] [address]     Elevated cholesterol     Hypertension     NSTEMI (non-ST elevated myocardial infarction) ([facility] [address] 11/10/2016       PAST SURGICAL HISTORY:  Past Surgical History:   Procedure Laterality Date    COLONOSCOPY N/A 6/28/2018    COLONOSCOPY performed by Jose Cifuentes MD at [facility] ENDOSCOPY    COLONOSCOPY N/A 1/18/2023    COLONOSCOPY performed by Ivonne Tee MD at [facility] ENDOSCOPY    [address]  11/11/2016    2 stents       FAMILY HISTORY:  Family History   Problem Relation Age of Onset    Heart Disease Father     Heart Attack Father     Hypertension Mother     Elevated Lipids Brother     Elevated Lipids Brother     No Known Problems Sister     Elevated Lipids Brother     No Known Problems Son     No Known Problems Daughter     Anesth Problems Neg Hx        SOCIAL HISTORY:  Social History     Socioeconomic History    Marital status:    Tobacco Use    Smoking status: Never     Passive exposure: Never    Smokeless tobacco: Never   Vaping Use    Vaping Use: Never used   Substance and Sexual Activity    Alcohol use:  Yes     Alcohol/week: 2.0 standard drinks     Types: 1 Cans of beer, 1 Shots of liquor per week     Comment: rarely    Drug use: No    Sexual activity: Yes     Social Determinants of Health     Financial Resource Strain: Medium Risk    Difficulty of Paying Living Expenses: Somewhat hard   Food Insecurity: Food Insecurity Present    Worried About Running Out of Food in the Last Year: Never true    Ran Out of Food in the Last Year: Often true       LABORATORY RESULTS:     Labs Latest Ref Rng & Units 1/28/2023 1/27/2023 1/26/2023 1/25/2023 1/19/2023 1/18/2023 1/17/2023   WBC 4.1 - 11.1 K/uL 6.3 6.7 5.3 4.8 5.4 4.8 5.8   RBC 4.10 - 5.70 M/uL 3.24(L) 3.59(L) 3.64(L) 3.69(L) 3.28(L) 3.47(L) 3.80(L) Hemoglobin 12.1 - 17.0 g/dL 8. 0(L) 9.2(L) 8. 9(L) 9.2(L) 8. 1(L) 8. 3(L) 9.2(L)   Hematocrit 36.6 - 50.3 % 26. 5(L) 29. 9(L) 30. 9(L) 30. 8(L) 27. 4(L) 29. 3(L) 31. 4(L)   MCV 80.0 - 99.0 FL 81.8 83.3 84.9 84 83.5 84.4 82.6   Platelets 107 - 825 K/uL 217 229 238 218 205 227 238   Lymphocytes 12 - 49 % 27 - 32 - - 38 -   Monocytes 5 - 13 % 11 - 11 11 - 9 -   Eosinophils 0 - 7 % 3 - 2 3 - 4 -   Basophils 0 - 1 % 1 - 1 1 - 1 -   Albumin 3.5 - 5.0 g/dL - 3. 0(L) 3. 2(L) 3.8 2. 9(L) 2. 9(L) 3.4(L)   Calcium 8.5 - 10.1 MG/DL 8.7 8.9 9.3 9.3 9.3 9.3 9.7   Glucose 65 - 100 mg/dL 289(H) 237(H) 198(H) 206(H) 266(H) 103(H) 168(H)   BUN 6 - 20 MG/DL 17 16 19 20 14 16 26(H)   Creatinine 0.70 - 1.30 MG/DL 1.48(H) 1.15 1.36(H) 1.14 1.03 1.11 1.48(H)   Sodium 136 - 145 mmol/L 132(L) 137 136 137 136 140 136   Potassium 3.5 - 5.1 mmol/L 3. 3(L) 3.9 4.6 4.7 5.0 4.5 5.3(H)   TSH 0.36 - 3.74 uIU/mL - - - - - - -   PSA 0.01 - 4.0 ng/mL - - - - - - 2.2   LDH 85 - 241 U/L - - - - - - -   Some recent data might be hidden     Lab Results   Component Value Date/Time    TSH 2.12 12/27/2022 02:36 PM    TSH 4.80 (H) 12/06/2022 03:53 AM    TSH 5.39 (H) 10/12/2022 09:10 AM    TSH 3.53 02/03/2022 11:47 AM    TSH 5.790 (H) 11/21/2019 04:45 PM    TSH 3.08 06/22/2018 01:53 PM    TSH 4.250 05/26/2015 09:43 AM       ALLERGY:  No Known Allergies     CURRENT MEDICATIONS:    Current Facility-Administered Medications:     metoprolol succinate (TOPROL-XL) XL tablet 12.5 mg, 12.5 mg, Oral, Q12H, Bobbette Sicard, MD, 12.5 mg at 01/27/23 2120    lidocaine 4 % patch 1 Patch, 1 Patch, TransDERmal, Q24H, Kathy Brown MD, 1 Patch at 01/27/23 1136    heparin 25,000 units in D5W 250 ml infusion, 12-25 Units/kg/hr, IntraVENous, TITRATE, Claudia Rodriguez, NP, Last Rate: 8.7 mL/hr at 01/28/23 0755, 16 Units/kg/hr at 01/28/23 0755    amiodarone (CORDARONE) tablet 400 mg, 400 mg, Oral, BID, Christal Chiu MD    aspirin delayed-release tablet 81 mg, 81 mg, Oral, DAILY, Jenn Heber STALEY MD, 81 mg at 01/27/23 1036    sodium chloride (NS) flush 5-40 mL, 5-40 mL, IntraVENous, Q8H, Heber Barajas MD, 10 mL at 01/28/23 0514    sodium chloride (NS) flush 5-40 mL, 5-40 mL, IntraVENous, PRN, Heber Barajas MD    acetaminophen (TYLENOL) tablet 650 mg, 650 mg, Oral, Q6H PRN, 650 mg at 01/28/23 0003 **OR** acetaminophen (TYLENOL) suppository 650 mg, 650 mg, Rectal, Q6H PRN, Heber Barajas MD    polyethylene glycol (MIRALAX) packet 17 g, 17 g, Oral, DAILY PRN, Heber Barajas MD    ondansetron (ZOFRAN ODT) tablet 4 mg, 4 mg, Oral, Q8H PRN **OR** ondansetron (ZOFRAN) injection 4 mg, 4 mg, IntraVENous, Q6H PRN, Heber Barajas MD    furosemide (LASIX) injection 40 mg, 40 mg, IntraVENous, BID, Heber Barajas MD, 40 mg at 01/27/23 1705    heparin (porcine) pf 500 Units, 500 Units, InterCATHeter, PRN, Heber Barajas MD    pantoprazole (PROTONIX) tablet 40 mg, 40 mg, Oral, ACB, Heber Barajas MD, 40 mg at 01/28/23 0710    rosuvastatin (CRESTOR) tablet 20 mg, 20 mg, Oral, QHS, Heber Barajas MD, 20 mg at 01/27/23 2120    zolpidem (AMBIEN) tablet 5 mg, 5 mg, Oral, QHS PRN, Heber Barajas MD, 5 mg at 01/27/23 2127    milrinone (PRIMACOR) 20 MG/100 ML D5W infusion, 0.2 mcg/kg/min, IntraVENous, CONTINUOUS, Heber Barajas MD, Last Rate: 3.3 mL/hr at 01/28/23 0756, 0.2 mcg/kg/min at 01/28/23 0756    heparin (porcine) pf 500 Units, 500 Units, InterCATHeter, DAILY, Heber Barajas MD    PATIENT CARE TEAM:  Patient Care Team:  Erin Broderick MD as PCP - General (Family Medicine)  Erin Broderick MD as PCP - [facility]  Jaime Eason MD (Cardiovascular Disease Physician)  Ivonne Tee MD (Gastroenterology)  Krishna Bell MD (Cardiothoracic Surgery)  Fausto Sofia MD (Cardiovascular Disease Physician)  Paola Fleming MD (Nephrology)  Darwin Gill RN as Care Transitions Nurse  Tala Rodgers MD (Pulmonary Disease)  Jack Del Toro MD ([facility] Vascular Surgery)     Thank you for allowing me to participate in this patient's care.     Bear Hidalgo NP   [address] [place] [address], [address]  Phone: [phone]

## 2023-01-28 NOTE — PROGRESS NOTES
Sinus tachycardia is usually due to underlying medical problems and cannot be suppressed with drugs. Goal is to treat underlying CHF and any infection, anemia  Stop digoxin.   High risk of toxicity while be loaded on amiodarone

## 2023-01-28 NOTE — PROGRESS NOTES
[facility] [address] [place] [address] Rd Note                 [x]Established visit     Patient Name: Asiya Valera - XDC:[id number]  Primary Cardiologist: Mey Burns MD  Consulting Cardiologist: Thuan Ying MD     Reason for initial visit: tachycardia    HPI:      SUBJECTIVE:  71 y. o.man with ischemic cardiomyopathy and on life vest defibrillator  He is now less tired and dyspneic  Currently on milrinone 0.2  Atrial flutter was present on ECG 2:1 AV conduction resolved with amiodarone     Currently in sinus tachycardia 102 bpm     He had CABG and ischemic cardiomyopathy     He had been seen by Dr Leland Mendoza and Brian Reyes, no target for PCI any more  2 grafts patent   There is ongoing LVAD work ups       Assessment and Plan     Atrial flutter with rapid ventricular rate- terminated with IV amiodarone  Stopped corlanor  I explained to him and his daughter about risks and benefits of amiodarone  Short term he can use it. He is loaded day 2 now  Toprol XL 12.5 mg every day low dose  Long term it may cause lung and thyroid problem in him. Otherwise consider biventricular ICD and AV node ablation   If he is turned down for LVAD then I recommend above procedure. Atrial fibrillation and atypical atrial flutter ablation can be considered but NSR may not last long and procedure is higher risk for him with general anesthesia and severe cardiomyopathy, low bp tendency    Ischemic cardiomyopathy and acute on chronic systolic CHF- continue meds with Dr Agustin Tolentino and LVAD evaluation  On milrinone    CAD with troponin elevation- Dr Brian Reyes and Leland Mendoza reviewed previous cath film  No more revascularization can be done    Meka Vasquez M.D.  [facility] [place]  Electrophysiology/Cardiology  [facility]  [address] [facility], [facility] [address] [place] [phone]                                                     ____________________________________________________________    Cardiac testing  12/22/22    ECHO ADULT FOLLOW-UP OR LIMITED 01/09/2023 1/9/2023    Interpretation Summary    Left Ventricle: Severely reduced left ventricular systolic function with a visually estimated EF of 25 - 30%. Left ventricle size is normal. Mildly increased wall thickness. Signed by: Yoan Garcia MD on 1/9/2023  4:18 PM        12/22/22    NUCLEAR CARDIAC AMYLOID SPECT 12/30/2022 12/30/2022    Narrative    Nuclear Cardiac Amyloid SPECT:  Equivocal for myocardial TTR amyloidosis. History: Cardiomyopathy, evaluate for amyloid. Radiotracer: 16.2 mCi technetium pyrophosphate IV. Comparison: None  Correlative imaging:  None    Nuclear SPECT and PLANAR cardiac images were obtained at least one hour postinjection. FINDINGS:  Semiquantitative visual grading of myocardial radiotracer uptake compared to osseous/rib uptake: Myocardial uptake less than rib uptake (visual score: 1).    H/CL ratio: 1.45    Impression  : Nuclear Cardiac Amyloid SPECT:  Equivocal for myocardial TTR amyloidosis. TTR Amyloidosis reference for interpretation:  A. Not suggestive: a semiquantitative visual score of 0 or H/CL ratio<1.  B: Strongly suggestive: A semiquantitative visual score of 2 or 3 or H/CL ratio >1.5. C. Equivocal: A semiquantitative visual score of 1 or H/CL ratio 1-1.5. Signed by: Preet Tran MD on 12/30/2022 10:00 PM    12/22/22    CARDIAC PROCEDURE 01/16/2023 1/16/2023    Conclusion  Findings  1. Low filling pressures  2. Low normal cardiac output/cardiac index. Cardiac index is 2 L/min/m²    Access right internal jugular vein no issues    Recommendations  1. May hydrate if blood pressures are marginal  2. Continue guideline directed medical therapy if tolerated well    Signed by:  Yoan Garcia MD on 1/16/2023  4:00 PM        Most recent HS troponins:  Recent Labs     01/28/23  0526 01/27/23  1448 01/27/23  1009   TROPHS 2,512* 3,141* 3,286*       ECG: atrial flutter with RVR  Review of Systems    [x]All other systems reviewed and all negative except as written in HPI    [] Patient unable to provide secondary to condition         Past Medical History:   Diagnosis Date    CAD (coronary artery disease) 11/10/2016    NSTEMI & 2 stents    Deafness 10/28/2012    DM (diabetes mellitus) ([facility] [address]     Elevated cholesterol     Hypertension     NSTEMI (non-ST elevated myocardial infarction) ([facility] [address] 11/10/2016     Past Surgical History:   Procedure Laterality Date    COLONOSCOPY N/A 6/28/2018    COLONOSCOPY performed by Reinaldo Cota MD at [facility] ENDOSCOPY    COLONOSCOPY N/A 1/18/2023    COLONOSCOPY performed by Chris Robertson MD at [address] [place] [address]  11/11/2016    2 stents     Social Hx:  reports that he has never smoked. He has never been exposed to tobacco smoke. He has never used smokeless tobacco. He reports current alcohol use of about 2.0 standard drinks per week. He reports that he does not use drugs. Family Hx: family history includes Elevated Lipids in his brother, brother, and brother; Heart Attack in his father; Heart Disease in his father; Hypertension in his mother; No Known Problems in his daughter, sister, and son.   No Known Allergies       OBJECTIVE:  Wt Readings from Last 3 Encounters:   01/26/23 120 lb (54.4 kg)   01/25/23 123 lb (55.8 kg)   01/23/23 121 lb (54.9 kg)       Intake/Output Summary (Last 24 hours) at 1/28/2023 0905  Last data filed at 1/28/2023 0328  Gross per 24 hour   Intake 1564.41 ml   Output 3950 ml   Net -2385.59 ml           Physical Exam    Vitals:   Vitals:    01/28/23 0349 01/28/23 0530 01/28/23 0555 01/28/23 0713   BP:  (!) 109/46  (!) 104/46   Pulse: (!) 107  (!) 102 (!) 102   Resp:    22   Temp:    98.6 °F (37 °C)   SpO2:    98%   Weight:       Height: Telemetry:  sinus tachycardia    Visit Vitals  BP (!) 104/46 (BP 1 Location: Left upper arm, BP Patient Position: At rest)   Pulse (!) 102   Temp 98.6 °F (37 °C)   Resp 22   Ht 5' 9\" (1.753 m)   Wt 120 lb (54.4 kg)   SpO2 98%   BMI 17.72 kg/m²     General Appearance:  thin,alert and oriented x 3, and individual in no acute distress. Ears/Nose/Mouth/Throat:   Hearing grossly normal.         Neck: Supple. Chest:   Lungs clear to auscultation bilaterally. Cardiovascular:  regular rhythm, no murmur. Abdomen:   Soft, non-tender, flat   Extremities: No edema bilaterally. Skin: Warm and dry. Data Review:     Radiology:   XR Results (most recent):  Results from Hospital Encounter encounter on 01/26/23    XR CHEST PA LAT    Narrative  Indication:  sob    Exam: PA and lateral views of the chest.    Direct comparison is made to prior CXR dated January 1, 2023. Direct comparison  is also made to prior CT dated January 16, 2023. Findings: Cardiomediastinal silhouette is stable. Median sternotomy wires are  noted. Right-sided PICC line extends to the mid SVC. There are new diffuse  bilateral increased interstitial markings which are partially obscuring  bilateral pulmonary nodules. There is no pleural fluid. There is no  pneumothorax. Impression  New diffuse bilateral increased interstitial markings, partially  obscuring bilateral pulmonary nodules. CT can be performed for further  evaluation, as indicated. CT Results (most recent):  Results from Hospital Encounter encounter on 12/22/22    CT CHEST WO CONT    Narrative  INDICATION: Shortness of breath with recent lung nodules. COMPARISON: 12/12/2022    CONTRAST: None. TECHNIQUE:  5 mm axial images were obtained through the chest. Coronal and  sagittal reformats were generated.   CT dose reduction was achieved through use  of a standardized protocol tailored for this examination and automatic exposure  control for dose modulation. The absence of intravenous contrast reduces the sensitivity for evaluation of  the mediastinum, andreea, vasculature, and upper abdominal organs. FINDINGS:    CHEST WALL: Right subclavian central venous catheter. No axillary or  supraclavicular adenopathy. Median sternotomy changes. THYROID: No nodule. MEDIASTINUM: No mass or lymphadenopathy. ANDREEA: No mass or lymphadenopathy. THORACIC AORTA: No aneurysm. MAIN PULMONARY ARTERY: Normal in caliber. TRACHEA/BRONCHI: Unremarkable  ESOPHAGUS: No wall thickening or dilatation. HEART: Normal in size. Coronary artery calcium: present  PLEURA: No effusion or pneumothorax. LUNGS: Multiple groundglass pulmonary nodules scattered throughout the lungs  which have increased in number since prior study. Decreased bilateral pleural  effusions. INCIDENTALLY IMAGED UPPER ABDOMEN: Cholelithiasis  BONES: No destructive bone lesion. Impression  1. Small ground glass nodules scattered throughout the lungs. Findings favor  infectious etiology. Recommend short-term follow-up chest CT in 4-6 weeks. 2.  Cholelithiasis. Resolution of previous pleural effusions. MRI Results (most recent):  Results from [place] encounter on 12/22/22    MRI CARDIAC MORPH FUNC W WO CONT    Narrative  CARDIOVASCULAR MRI    INDICATION: suspected myocarditis. Dr. Kay David aware of need for MRI and planned  for Tuesday at [facility]:    CPT Codes: 46059    SCANS PERFORMED:  Spin Echo: Axial.  FIESTA/SSFP  LGE    Contrast Agent: ProHance. Contrast Dose: 15ml. CLINICAL DATA:  HR = 103/min, BP = 81/61mmHg,  HT = 5 foot 9inc, WT = 108lb. Impression  1. Normal left ventricular cavity size. Severe left ventricular systolic  dysfunction. Severe hypokinesis of the base to mid and distal anterior wall,  anteroseptal wall, anteroapical wall, apical septal wall and apex. Mild  hypokinesis of the lateral wall. 3-D LVEF 23%.   2. Normal right ventricular size and systolic function. 3. Sclerotic aortic valve leaflets. Mild to moderate aortic valve stenosis. Aortic valve area is 1.3 sq cm by planimetry. 4. On T2 W imaging there is no significant myocardial edema seen. On EGE and LGE  study, there is patchy subendocardial infarct involving LAD territory including  mid to distal anterior wall, anteroseptal wall, anteroapical wall, apex, apical  septal wall involving less than 25% thickness of the myocardial wall with  thinning of the apex and apical septal wall. There is medium grade myocardial  viability of these walls. There is a small to medium size subendocardial infarct  of the basal inferior wall. There is mild patchy subepicardial enhancement of  the lateral wall suggestive of nonischemic etiology. There is no features of  infiltrative sarcoidosis or cardiac amyloidosis. 5. Normal pleura and pericardium. There is no significant effusions. Pericardium:  Normal. (<3.5mm)  Pericardial Effusion:  None. Pleural Effusion:  None.     VOLUMETRIC DATA:    LVEDVI:  71 ml/m2 (Normal 47-92 mL/sq-m)  LVESVI:  55 ml/m2 (Normal 13-30 mL/sq-m)  LVSVI:  17 ml/m2 (Normal 32-62 mL/sq-m)  LVMI:  59 g/m2       Recent Labs     01/28/23 0526 01/27/23 0052 01/26/23 2038   * 137  --    K 3.3* 3.9  --     105  --    CO2 24 23  --    BUN 17 16  --    CREA 1.48* 1.15  --    * 237*  --    PHOS  --   --  2.6   CA 8.7 8.9  --        Recent Labs     01/28/23  0526 01/27/23 0052   WBC 6.3 6.7   HGB 8.0* 9.2*   HCT 26.5* 29.9*    229       Recent Labs     01/27/23 0052 01/26/23  1651   * 128*       Current meds:    Current Facility-Administered Medications:     metoprolol succinate (TOPROL-XL) XL tablet 12.5 mg, 12.5 mg, Oral, Q12H, Saray Pickett MD, 12.5 mg at 01/27/23 2120    lidocaine 4 % patch 1 Patch, 1 Patch, TransDERmal, Q24H, Roxanne Pack MD, 1 Patch at 01/27/23 1136    heparin 25,000 units in D5W 250 ml infusion, 12-25 Units/kg/hr, IntraVENous, TITRATE, Tony Rodriguez, NP, Last Rate: 8.7 mL/hr at 01/28/23 0755, 16 Units/kg/hr at 01/28/23 0755    amiodarone (CORDARONE) tablet 400 mg, 400 mg, Oral, BID, Erick Doe MD    aspirin delayed-release tablet 81 mg, 81 mg, Oral, DAILY, Heber Barajas MD, 81 mg at 01/27/23 1036    sodium chloride (NS) flush 5-40 mL, 5-40 mL, IntraVENous, Q8H, Heber Barajas MD, 10 mL at 01/28/23 0514    sodium chloride (NS) flush 5-40 mL, 5-40 mL, IntraVENous, PRN, Heber Barajas MD    acetaminophen (TYLENOL) tablet 650 mg, 650 mg, Oral, Q6H PRN, 650 mg at 01/28/23 0003 **OR** acetaminophen (TYLENOL) suppository 650 mg, 650 mg, Rectal, Q6H PRN, Heber Barajas MD    polyethylene glycol (MIRALAX) packet 17 g, 17 g, Oral, DAILY PRN, Heber Barajas MD    ondansetron (ZOFRAN ODT) tablet 4 mg, 4 mg, Oral, Q8H PRN **OR** ondansetron (ZOFRAN) injection 4 mg, 4 mg, IntraVENous, Q6H PRN, Heber Barajas MD    furosemide (LASIX) injection 40 mg, 40 mg, IntraVENous, BID, Heber Barajas MD, 40 mg at 01/27/23 1705    heparin (porcine) pf 500 Units, 500 Units, InterCATHeter, PRN, Heber Barajas MD    pantoprazole (PROTONIX) tablet 40 mg, 40 mg, Oral, ACB, Heber Barajas MD, 40 mg at 01/28/23 0710    rosuvastatin (CRESTOR) tablet 20 mg, 20 mg, Oral, QHS, Heber Barajas MD, 20 mg at 01/27/23 2120    zolpidem (AMBIEN) tablet 5 mg, 5 mg, Oral, QHS PRN, Heber Barajas MD, 5 mg at 01/27/23 2127    milrinone (PRIMACOR) 20 MG/100 ML D5W infusion, 0.2 mcg/kg/min, IntraVENous, CONTINUOUS, Heber Barajas MD, Last Rate: 3.3 mL/hr at 01/28/23 0756, 0.2 mcg/kg/min at 01/28/23 0756    heparin (porcine) pf 500 Units, 500 Units, InterCATHeter, DAILY, Heber Barajas MD Juda Hedge, MD  [facility] [address] [place] [address] 13, [address] [place] [address],[address]  [facility]  [phone]      Dmitriy Ingram MD

## 2023-01-29 NOTE — PROGRESS NOTES
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, LVEF 25-30%, stage D, NYHA class IIIB/IV; initiated on home milrinone gtt as bridge to completion of LVAD workup  Most likely etiology of cardiomyopathy: CAD +/- TRISTAN +/- inappropriate sinus tach +/- undergoing workup  Patient presented with symptomatic SVT (a-flutter) with RVR converted to sinus tach after amio loading  Plan to continue amiodarone and beta-blockers, EP consultation, complete remainder of LVAD workup while inpatient and discuss at [facility]; now with concern for PNA, r/o bacteremia, treatment per hospitalist        RECOMMENDATIONS:  Continue milrinone  0.2mcg/kg/min unable to uptitrate due to HR and BP  Continue toprol XL 12.5mg BID  Cannot tolerate ARB/ARNi due to hypotension  Of note Flomax was held and BP responded well   Cannot tolerate spironolactone due to hyperkalemia  Cannot tolerate jardiance 10mg daily due to significant diuresis on smallest dose/contributed to IVVD  Discontinued corlanor due to SVT  Digoxin stopped d/t risk for toxicity with amio loading  Continue amiodarone, appreciate EP cardiology recs  Continue lasix 40mg IV BID  Potassium 20mEq BID  Keep K+ >4 and Mg >2  Abx/treatment of suspected PNA per hospitalist  Continue lifevest ordered  Continue baby aspirin   Plavix previously stopped, okayed by Dr. Diallo Gore consult pending (high risk for TRISTAN)  VAD evaluation in progress, will need PFT/DLCO, maxillary CT with contrast, urology consult, sleep medicine consult  Concern re: GI endoscopy pathology results  Holding on CT head/maxillofacial until we have input from GI, Onc, Palliative tomorrow   CAV cardiology consulted; appreciate their input     All other care per primary team, hopefully home end of the week      INTERVAL HISTORY:  NAEO  Urine cx in process  Blood cx NGTD at 24 hrs  Cr rising  Net -300mL last 24 hrs  Pro BNP stable       IMPRESSION:  Acute on chronic combined systolic/diastolic  heart failure  Stage D, NYHA class IIIB/IV symptoms   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, LVEF 25-30% and 23% (by cMRI 1/3/23)  Cardiac MRI suggestive of ischemic cardiomyopathy  PYP equivocal  Chronic milrinone infusion as palliation   Coronary artery disease  CAD s/p CABG x 2: further disease best managed medically due to small vessel size   At risk of sudden cardiac death  Peripheral arterial disease  Bilateral hydronephrosis s/p donnelly  Cardiac risk factors:  HTN  HL  TRISTAN, STOP-BANG 4  DM2  CKD, stage 3  MOCA from 1/4/22 17/30, consistent with mild cognitive impairment  Hard of hearing  Full code         LIFE GOALS:  Lifestyle goals reviewed with the patient. Patient's personal goals include: TBD  Important upcoming milestones or family events: TBD  The patient identifies the following as important for living well: TBD                CARDIAC IMAGING:  Echo 1/9/23   Left Ventricle: Severely reduced left ventricular systolic function with a visually estimated EF of 25 - 30%. Left ventricle size is normal. Mildly increased wall thickness. Echo 12/26/22    Left Ventricle: Severely reduced left ventricular systolic function with a visually estimated EF of 25 - 30%. Left ventricle size is normal. Normal wall thickness. There are regional wall motion abnormalities. Grade II diastolic dysfunction with increased LAP. Right Ventricle: Moderately reduced systolic function. TAPSE is abnormal. TAPSE is 1.1 cm. Aortic Valve: Mild stenosis of the aortic valve. AV peak gradient is 13 mmHg. AV peak velocity is 1.8 m/s.     Mitral Valve: Not well visualized. Moderate annular calcification at the posterior leaflet of the mitral valve. Mild to moderate regurgitation. Tricuspid Valve: Mildly elevated RVSP. Left Atrium: Left atrium is moderately dilated. 12/8/22    Left Ventricle: Moderately reduced left ventricular systolic function with a visually estimated EF of 35 - 40%. Severe hypokinesis of the following segments: mid anteroseptal, apical anterior, apical septal, apical inferior and apical lateral. Severe hypokinesis of the apex. Mitral Valve: Severely thickened leaflet, at the anterior and posterior leaflets. Severely calcified leaflet, at the anterior and posterior leaflets. Mild annular calcification of the mitral valve. Moderate regurgitation. Left Atrium: Left atrium is mildly dilated. Contrast used: Definity. limited study     EKG 12/22/22 ST, Biatria enlargement, marked ST abnormality     LHC 12/6/22  1. Normal LVEDP  2. Severe native multivessel coronary artery disease  3. Patent LIMA to LAD and vein graft to distal RCA  4. Recurrent ISR in OM1 stent with now 60 to 70% restenosis  5. Recoil of left main and circumflex stent with now recurrent 40 to 50% stenosis. 6.  Progression of ostial left main disease now to about 60% stenosis  7. Progression of disease in jailed first marginal branch now with diffuse 90% stenosis  8. High-grade stenosis in the mid to distal right potential femoral artery treated with 6 x 40 mm impact drug-coated balloon angioplasty to reduce the stenosis to less than 40%     NST        HEMODYNAMICS:  RHC 1/9/23  PA 20/9, RA 3, PCWP 8, CI 1.8     CPEST too ill   6MW 300 feet       HPI:  71 y.o. male who was admitted via ED for worsening SOB, poor appetite, orthopnea and fatigue. Pmhx of CAD s/p CABG, PAD, DM 2, recent admission for HFmrEF earlier this month. Serial TTEs show progressive decline in EF since 3/2021 with the most recent EF at 25-30%.      Recent LHC shows diffuse CAD and no interventions indicated. Patient had Rosamaria Kerhonkson recently and there is some thought to myocarditis or other etiology causing worsening HF. [facility] was consulted for further evaluation and management of HFrEF. REVIEW OF SYSTEMS:  Review of Systems   Constitutional:  Positive for fever and malaise/fatigue. Negative for chills. Respiratory:  Positive for shortness of breath. Negative for cough. Cardiovascular:  Positive for leg swelling. Negative for chest pain, palpitations and orthopnea. Gastrointestinal:  Negative for abdominal pain, heartburn, nausea and vomiting. Neurological:  Positive for weakness. Negative for dizziness. PHYSICAL EXAM:  Visit Vitals  BP (!) 98/39   Pulse 96   Temp 98.8 °F (37.1 °C)   Resp 24   Ht 5' 9\" (1.753 m)   Wt 120 lb (54.4 kg)   SpO2 95%   BMI 17.72 kg/m²     Physical Exam  Vitals and nursing note reviewed. Constitutional:       General: He is not in acute distress. Appearance: Normal appearance. He is ill-appearing. Cardiovascular:      Rate and Rhythm: Regular rhythm. Tachycardia present. Heart sounds: Normal heart sounds. No murmur heard. No friction rub. No gallop. Pulmonary:      Effort: Pulmonary effort is normal. No respiratory distress. Breath sounds: Normal breath sounds. Abdominal:      General: Bowel sounds are normal. There is no distension. Palpations: Abdomen is soft. Tenderness: There is no abdominal tenderness. Musculoskeletal:      Right lower le+ Pitting Edema present. Left lower le+ Pitting Edema present. Skin:     General: Skin is warm and dry. Capillary Refill: Capillary refill takes less than 2 seconds. Neurological:      General: No focal deficit present. Mental Status: He is alert and oriented to person, place, and time. Psychiatric:         Mood and Affect: Mood normal.         Behavior: Behavior normal.         Thought Content:  Thought content normal.         Judgment: Judgment normal.            PAST MEDICAL HISTORY:  Past Medical History:   Diagnosis Date    CAD (coronary artery disease) 11/10/2016    NSTEMI & 2 stents    Deafness 10/28/2012    DM [facility]di[facility] mellitus) ([facility]     Elevated cholesterol     Hypertension     NSTEMI (non-ST elevated myocardial infarction) ([facility] 11/10/2016       PAST SURGICAL HISTORY:  Past Surgical History:   Procedure Laterality Date    COLONOSCOPY N/A 6/28/2018    COLONOSCOPY performed by Laura Ibanez MD at [facility] ENDOSCOPY    COLONOSCOPY N/A 1/18/2023    COLONOSCOPY performed by Dena Davis MD at [facility] ENDOSCOPY    [address]  11/11/2016    2 stents       FAMILY HISTORY:  Family History   Problem Relation Age of Onset    Heart Disease Father     Heart Attack Father     Hypertension Mother     Elevated Lipids Brother     Elevated Lipids Brother     No Known Problems Sister     Elevated Lipids Brother     No Known Problems Son     No Known Problems Daughter     Anesth Problems Neg Hx        SOCIAL HISTORY:  Social History     Socioeconomic History    Marital status:    Tobacco Use    Smoking status: Never     Passive exposure: Never    Smokeless tobacco: Never   Vaping Use    Vaping Use: Never used   Substance and Sexual Activity    Alcohol use:  Yes     Alcohol/week: 2.0 standard drinks     Types: 1 Cans of beer, 1 Shots of liquor per week     Comment: rarely    Drug use: No    Sexual activity: Yes     Social Determinants of Health     Financial Resource Strain: Medium Risk    Difficulty of Paying Living Expenses: Somewhat hard   Food Insecurity: Food Insecurity Present    Worried About Running Out of Food in the Last Year: Never true    Ran Out of Food in the Last Year: Often true       LABORATORY RESULTS:     Labs Latest Ref Rng & Units 1/29/2023 1/28/2023 1/28/2023 1/27/2023 1/26/2023 1/25/2023 1/19/2023   WBC 4.1 - 11.1 K/uL 8.8 - 6.3 6.7 5.3 4.8 5.4   RBC 4.10 - 5.70 M/uL 3.52(L) - 3.24(L) 3.59(L) 3.64(L) 3.69(L) 3.28(L)   Hemoglobin 12.1 - 17.0 g/dL 8.7(L) - 8. 0(L) 9.2(L) 8. 9(L) 9.2(L) 8. 1(L)   Hematocrit 36.6 - 50.3 % 29. 3(L) - 26. 5(L) 29. 9(L) 30. 9(L) 30. 8(L) 27. 4(L)   MCV 80.0 - 99.0 FL 83.2 - 81.8 83.3 84.9 84 83.5   Platelets 570 - 869 K/uL 270 - 217 229 238 218 205   Lymphocytes 12 - 49 % 33 - 27 - 32 - -   Monocytes 5 - 13 % 10 - 11 - 11 11 -   Eosinophils 0 - 7 % 3 - 3 - 2 3 -   Basophils 0 - 1 % 1 - 1 - 1 1 -   Albumin 3.5 - 5.0 g/dL - - - 3. 0(L) 3. 2(L) 3.8 2. 9(L)   Calcium 8.5 - 10.1 MG/DL 8.8 8.4(L) 8.7 8.9 9.3 9.3 9.3   Glucose 65 - 100 mg/dL 280(H) 380(H) 289(H) 237(H) 198(H) 206(H) 266(H)   BUN 6 - 20 MG/DL 24(H) 23(H) 17 16 19 20 14   Creatinine 0.70 - 1.30 MG/DL 1.78(H) 1.58(H) 1.48(H) 1.15 1.36(H) 1.14 1.03   Sodium 136 - 145 mmol/L 132(L) 132(L) 132(L) 137 136 137 136   Potassium 3.5 - 5.1 mmol/L 4.4 4.3 3.3(L) 3.9 4.6 4.7 5.0   TSH 0.36 - 3.74 uIU/mL - - 3.52 - - - -   PSA 0.01 - 4.0 ng/mL - - - - - - -   LDH 85 - 241 U/L - - - - - - -   Some recent data might be hidden     Lab Results   Component Value Date/Time    TSH 3.52 01/28/2023 05:26 AM    TSH 2.12 12/27/2022 02:36 PM    TSH 4.80 (H) 12/06/2022 03:53 AM    TSH 5.39 (H) 10/12/2022 09:10 AM    TSH 3.53 02/03/2022 11:47 AM    TSH 5.790 (H) 11/21/2019 04:45 PM    TSH 3.08 06/22/2018 01:53 PM    TSH 4.250 05/26/2015 09:43 AM       ALLERGY:  No Known Allergies     CURRENT MEDICATIONS:    Current Facility-Administered Medications:     potassium chloride SR (KLOR-CON 10) tablet 20 mEq, 20 mEq, Oral, BID, Lizzy STALEY NP, 20 mEq at 01/29/23 0854    cefTRIAXone (ROCEPHIN) 1 g in 0.9% sodium chloride 10 mL IV syringe, 1 g, IntraVENous, Q24H, Kathy Brown MD, 1 g at 01/28/23 1445    doxycycline (VIBRAMYCIN) 100 mg in 0.9% sodium chloride (MBP/ADV) 100 mL MBP, 100 mg, IntraVENous, Q12H, Kathy Brown MD, Last Rate: 100 mL/hr at 01/29/23 0034, 100 mg at 01/29/23 0034    glucose chewable tablet 16 g, 4 Tablet, Oral, PRN, Marixa Sidle, NP    glucagon (GLUCAGEN) injection 1 mg, 1 mg, IntraMUSCular, PRN, Ita Harvey NP    dextrose 10 % infusion 0-250 mL, 0-250 mL, IntraVENous, PRN, Ita Harvey NP    insulin lispro (HUMALOG) injection, , SubCUTAneous, AC&HS, Marixa Lim NP, 2 Units at 01/29/23 2020    balsam peru-castor oiL (VENELEX) ointment, , Topical, BID, Marixa Lim NP, Given at 01/29/23 0858    metoprolol succinate (TOPROL-XL) XL tablet 12.5 mg, 12.5 mg, Oral, Q12H, Sloan Whaley MD, 12.5 mg at 01/29/23 0854    lidocaine 4 % patch 1 Patch, 1 Patch, TransDERmal, Q24H, Debbie Campos MD, 1 Patch at 01/28/23 1152    heparin 25,000 units in D5W 250 ml infusion, 12-25 Units/kg/hr, IntraVENous, TITRATE, Lina Rodriguez NP, Last Rate: 10.9 mL/hr at 01/29/23 0033, 20 Units/kg/hr at 01/29/23 0033    amiodarone (CORDARONE) tablet 400 mg, 400 mg, Oral, BID, Yoon Vasquez MD, 400 mg at 01/29/23 3514    aspirin delayed-release tablet 81 mg, 81 mg, Oral, DAILY, Heber Barajas MD, 81 mg at 01/29/23 0854    sodium chloride (NS) flush 5-40 mL, 5-40 mL, IntraVENous, Q8H, Heber Barajas MD, 10 mL at 01/29/23 7959    sodium chloride (NS) flush 5-40 mL, 5-40 mL, IntraVENous, PRN, Heber Barajas MD    acetaminophen (TYLENOL) tablet 650 mg, 650 mg, Oral, Q6H PRN, 650 mg at 01/28/23 0003 **OR** acetaminophen (TYLENOL) suppository 650 mg, 650 mg, Rectal, Q6H PRN, Heber Barajas MD    polyethylene glycol (MIRALAX) packet 17 g, 17 g, Oral, DAILY PRN, Heber Barajas MD    ondansetron (ZOFRAN ODT) tablet 4 mg, 4 mg, Oral, Q8H PRN **OR** ondansetron (ZOFRAN) injection 4 mg, 4 mg, IntraVENous, Q6H PRN, Heber Barajas MD    furosemide (LASIX) injection 40 mg, 40 mg, IntraVENous, BID, Heber Barajas MD, 40 mg at 01/29/23 0854    heparin (porcine) pf 500 Units, 500 Units, InterCATHeter, PRN, Heber Barajas MD    pantoprazole (PROTONIX) tablet 40 mg, 40 mg, Oral, ACB, Heber Barajas MD, 40 mg at 01/29/23 1246    rosuvastatin (CRESTOR) tablet 20 mg, 20 mg, Oral, QHS, Heber Barajas MD, 20 mg at 01/28/23 2209    zolpidem (AMBIEN) tablet 5 mg, 5 mg, Oral, QHS PRN, Heber Barajas MD, 5 mg at 01/28/23 2218    milrinone (PRIMACOR) 20 MG/100 ML D5W infusion, 0.2 mcg/kg/min, IntraVENous, CONTINUOUS, Heber Barajas MD, Last Rate: 3.3 mL/hr at 01/29/23 8232, 0.2 mcg/kg/min at 01/29/23 6979    heparin (porcine) pf 500 Units, 500 Units, InterCATHeter, DAILY, Heber Barajas MD, 500 Units at 01/29/23 0855    PATIENT CARE TEAM:  Patient Care Team:  Irina Alcantara MD as PCP - General (Family Medicine)  Irina Alcantara MD as PCP - [address] [address] Provider  Kevin Cox MD (Cardiovascular Disease Physician)  Audrey Rodríguez MD (Gastroenterology)  Pablo Junior MD (Cardiothoracic Surgery)  Shira Toney MD (Cardiovascular Disease Physician)  Lavell Brannon MD (Nephrology)  Bre Viera RN as Care Transitions Nurse  Pritesh Valenzuela MD (Pulmonary Disease)  Parker Motley MD ([address] [place] [address] Vascular Surgery)     Thank you for allowing me to participate in this patient's care.     Hoang Mata NP   [address] [place] [address], [address]  Phone: [phone]

## 2023-01-29 NOTE — PROGRESS NOTES
[address] [facility]  Hospitalist Group                                                                                          Hospitalist Progress Note  Law Tyler MD  Answering service: [phone] OR [phone] from in house phone        Date of Service:  2023  NAME:  Emiliano Valadez  :  1950  MRN:  [id number]       Admission Summary:   HPI: \"Neal Kendall is a 71 y.o. male with history of ischemic cardiomyopathy, CAD status post PCI x2 HFrEF EF 25-30 stage D, NYHA class IIIb recently placed on milrinone drip as bridge to LVAD therapy, type 2 diabetes, hypertension, dyslipidemia, BPH, dyslipidemia, TRISTAN, CKD stage III who presents to hospital with complaints of elevated heart rate, difficulty with urination. Patient and wife state he had felt only mild improvement since discharge from hospital after his recent hospitalization. He was scheduled for outpatient voiding trial with urology and had Vasquez catheter removed. Wife states patient was able to void about 140 mL. Wife states since discharge, she has noted orthopnea, persistent tachycardia with heart rates in 120s and low BPs with systolics in the 18G as well as bilateral lower extremity edema. Patient however states he has not experienced any significant breathing issues. The patient denies any fever, chills, chest or abdominal pain, nausea, vomiting, cough, congestion, recent illness, palpitations, or dysuria. Remarkable vitals on ER Presentation: Heart rate 126  Labs Remarkable for: HGB 8.6, TROP 2033, glu 198, cr 1.36, bnp   ER Images: cxr: New diffuse bilateral increased interstitial markings, partially  obscuring bilateral pulmonary nodules. CT can be performed for further  evaluation, as indicated. \"       Interval history / Subjective:   Patient seen examined at bedside earlier.   Feels well, on amio po off amio gtt      Assessment & Plan:      Decompensated HFrEF LVEF 25-30% / Ischemic Cardiomyopathy / Milrinone gtt  CAD s/p CABG x2  Aflutter RVR   NSTEMI  -Continue amiodarone po stable off amio gtt . Appreciate EP, cardio and AHF  -cont milrinone gtt   -Appreciate EP plan to reduce amnio 40 mg every day after total 14 days at 400 mg twice daily then 200 daily  -if not candidate for lvad recommend biventricular ICD and AV node ablation  -Continue diuresis with Lasix 40 mg IV twice daily  -stopped dig   -heparin gtt, per cardio no need for rpt cath   -Follow lytes goal K greater than 4, mag greater than 2  -Strict I's and O's  - toprol 12.5 bid, cannot tolerate ACE/ARB due to hypotension, cannot tolerate spironolactone secondary to hyperkalemia, cannot tolerate Jardiance due to significant diuresis lower doses  -stopped corlanor due to above  -amio   On lifevest   -PTOT  -lvad workup per AHF      Fever  CAP  UTI  100.7 1/28  - CXR on admission shows diffuse focal opacities concerning for pneumonia  obtain blood cultures, UA is +   - CT chest ordered, pending   - COVID-negative, flu mycoplasma neg, legionella pending  - cont Rocephin, Doxy treat for 5 days last dose 2/2  -Follow urine culture will tailor antibiotic therapy accordingly    + Pathology report 1/18 Neuroendocrine tumor  -Biopsy from EGD came back with well differentiated neuroendocrine tumor grade 1  - Defer to GI will get oncology on board for further input on prognosis/treatment and candidacy for vad given this finding , discussed this with son and wife over phone     BPH with urinary retention  -Failed recent voiding trial  -donnelly in place, to remain   -urology consulted, still awaiting eval      CKD stage III   -Stable. Monitor with IV diuresis  GERD -chronic Continue PPI     PAD  / S/p Right SFA PTCA -followed by Dr. Srikanth Salazar   -Normal ABIs on recent admission     Critically ill, extremely high risk for decompensation.   CCT time 45 minutes    Spoke to patient wife and son over the phone updated on plan     Code status: full   Prophylaxis: [address] Payan discussed with: patient/family at bedside, nurse, case management  Anticipated Disposition: Greater than 48 hours   Cardiac monitoring: xTelemetry   Inpatient      Hospital Problems  Date Reviewed: 1/24/2023            Codes Class Noted POA    CHF (congestive heart failure) ([facility] ICD-10-CM: I50.9  ICD-9-CM: 428.0  12/12/2022 Unknown         Social Determinants of Health     Tobacco Use: Low Risk     Smoking Tobacco Use: Never    Smokeless Tobacco Use: Never    Passive Exposure: Never   Alcohol Use: Not on file   Financial Resource Strain: Medium Risk    Difficulty of Paying Living Expenses: Somewhat hard   Food Insecurity: Food Insecurity Present    Worried About Running Out of Food in the Last Year: Never true    Ran Out of Food in the Last Year: Often true   Transportation Needs: Not on file   Physical Activity: Not on file   Stress: Not on file   Social Connections: Not on file   Intimate Partner Violence: Not on file   Depression: Not at risk    PHQ-2 Score: 0   Housing Stability: Not on file       Review of Systems:   A comprehensive review of systems was negative except for that written in the HPI. Vital Signs:    Last 24hrs VS reviewed since prior progress note. Most recent are:  Visit Vitals  BP (!) 104/44   Pulse (!) 105   Temp 98.6 °F (37 °C)   Resp 28   Ht 5' 9\" (1.753 m)   Wt 55.4 kg (122 lb 2.2 oz)   SpO2 100%   BMI 18.04 kg/m²         Intake/Output Summary (Last 24 hours) at 1/29/2023 1301  Last data filed at 1/29/2023 1000  Gross per 24 hour   Intake 1353.4 ml   Output 1700 ml   Net -346.6 ml          Physical Examination:     I had a face to face encounter with this patient and independently examined them on 1/29/2023 as outlined below:          Constitutional:  No acute distress, cooperative, pleasant    ENT:  Oral mucosa moist, oropharynx benign. Resp:  Crackles bl bases. No wheezing/rhonchi/rales. No accessory muscle use.     CV:  Regular rhythm, normal rate, no murmurs, gallops, rubs GI:  Soft, non distended, non tender. normoactive bowel sounds, no hepatosplenomegaly     Musculoskeletal:  trace edema, warm, 2+ pulses throughout    Neurologic:  Moves all extremities. AAOx3, CN II-XII reviewed            Data Review:    Review and/or order of clinical lab test  Review and/or order of tests in the radiology section of CPT  Review and/or order of tests in the medicine section of CPT    I have independently reviewed and interpreted patient's lab and all other diagnostic data    Labs:     Recent Labs     01/29/23 0048 01/28/23 0526   WBC 8.8 6.3   HGB 8.7* 8.0*   HCT 29.3* 26.5*    217       Recent Labs     01/29/23 0048 01/28/23  1527 01/28/23 0526 01/27/23 0052 01/26/23  2038   * 132* 132*   < >  --    K 4.4 4.3 3.3*   < >  --     101 100   < >  --    CO2 24 26 24   < >  --    BUN 24* 23* 17   < >  --    CREA 1.78* 1.58* 1.48*   < >  --    * 380* 289*   < >  --    CA 8.8 8.4* 8.7   < >  --    MG 1.9 1.7 1.7   < > 1.5*   PHOS  --   --   --   --  2.6    < > = values in this interval not displayed. Recent Labs     01/27/23 0052 01/26/23  1651   ALT 23 27   * 128*   TBILI 0.5 0.4   TP 6.6 7.0   ALB 3.0* 3.2*   GLOB 3.6 3.8       Recent Labs     01/27/23  1434   APTT 51.7*        No results for input(s): FE, TIBC, PSAT, FERR in the last 72 hours. Lab Results   Component Value Date/Time    Folate 10.5 07/03/2018 09:24 AM        No results for input(s): PH, PCO2, PO2 in the last 72 hours. No results for input(s): CPK, CKNDX, TROIQ in the last 72 hours.     No lab exists for component: CPKMB  Lab Results   Component Value Date/Time    Cholesterol, total 170 01/12/2023 05:00 AM    HDL Cholesterol 40 01/12/2023 05:00 AM    LDL, calculated 87 01/12/2023 05:00 AM    Triglyceride 215 (H) 01/12/2023 05:00 AM    CHOL/HDL Ratio 4.3 01/12/2023 05:00 AM     Lab Results   Component Value Date/Time    Glucose (POC) 342 (H) 01/29/2023 11:27 AM    Glucose (POC) 315 (H) 01/29/2023 11:16 AM    Glucose (POC) 180 (H) 01/29/2023 06:14 AM    Glucose (POC) 273 (H) 01/29/2023 01:12 AM    Glucose (POC) 505 (H) 01/28/2023 09:21 PM     Lab Results   Component Value Date/Time    Color YELLOW/STRAW 01/28/2023 12:11 PM    Appearance HAZY (A) 01/28/2023 12:11 PM    Specific gravity 1.010 01/28/2023 12:11 PM    Specific gravity 1.013 01/01/2023 09:13 AM    pH (UA) 5.5 01/28/2023 12:11 PM    Protein TRACE (A) 01/28/2023 12:11 PM    Glucose 100 (A) 01/28/2023 12:11 PM    Ketone Negative 01/28/2023 12:11 PM    Bilirubin Negative 01/28/2023 12:11 PM    Urobilinogen 1.0 01/28/2023 12:11 PM    Nitrites Negative 01/28/2023 12:11 PM    Leukocyte Esterase MODERATE (A) 01/28/2023 12:11 PM    Epithelial cells FEW 01/28/2023 12:11 PM    Bacteria Negative 01/28/2023 12:11 PM    WBC 5-10 01/28/2023 12:11 PM    RBC  01/28/2023 12:11 PM       Notes reviewed from all clinical/nonclinical/nursing services involved in patient's clinical care. Care coordination discussions were held with appropriate clinical/nonclinical/ nursing providers based on care coordination needs. Patients current active Medications were reviewed, considered, added and adjusted based on the clinical condition today. Home Medications were reconciled to the best of my ability given all available resources at the time of admission. Route is PO if not otherwise noted.       Medications Reviewed:     Current Facility-Administered Medications   Medication Dose Route Frequency    potassium chloride SR (KLOR-CON 10) tablet 20 mEq  20 mEq Oral BID    cefTRIAXone (ROCEPHIN) 1 g in 0.9% sodium chloride 10 mL IV syringe  1 g IntraVENous Q24H    doxycycline (VIBRAMYCIN) 100 mg in 0.9% sodium chloride (MBP/ADV) 100 mL MBP  100 mg IntraVENous Q12H    glucose chewable tablet 16 g  4 Tablet Oral PRN    glucagon (GLUCAGEN) injection 1 mg  1 mg IntraMUSCular PRN    dextrose 10 % infusion 0-250 mL  0-250 mL IntraVENous PRN    insulin lispro (HUMALOG) injection   SubCUTAneous AC&HS    balsam peru-castor oiL (VENELEX) ointment   Topical BID    metoprolol succinate (TOPROL-XL) XL tablet 12.5 mg  12.5 mg Oral Q12H    lidocaine 4 % patch 1 Patch  1 Patch TransDERmal Q24H    heparin 25,000 units in D5W 250 ml infusion  12-25 Units/kg/hr IntraVENous TITRATE    amiodarone (CORDARONE) tablet 400 mg  400 mg Oral BID    aspirin delayed-release tablet 81 mg  81 mg Oral DAILY    sodium chloride (NS) flush 5-40 mL  5-40 mL IntraVENous Q8H    sodium chloride (NS) flush 5-40 mL  5-40 mL IntraVENous PRN    acetaminophen (TYLENOL) tablet 650 mg  650 mg Oral Q6H PRN    Or    acetaminophen (TYLENOL) suppository 650 mg  650 mg Rectal Q6H PRN    polyethylene glycol (MIRALAX) packet 17 g  17 g Oral DAILY PRN    ondansetron (ZOFRAN ODT) tablet 4 mg  4 mg Oral Q8H PRN    Or    ondansetron (ZOFRAN) injection 4 mg  4 mg IntraVENous Q6H PRN    furosemide (LASIX) injection 40 mg  40 mg IntraVENous BID    heparin (porcine) pf 500 Units  500 Units InterCATHeter PRN    pantoprazole (PROTONIX) tablet 40 mg  40 mg Oral ACB    rosuvastatin (CRESTOR) tablet 20 mg  20 mg Oral QHS    zolpidem (AMBIEN) tablet 5 mg  5 mg Oral QHS PRN    milrinone (PRIMACOR) 20 MG/100 ML D5W infusion  0.2 mcg/kg/min IntraVENous CONTINUOUS    heparin (porcine) pf 500 Units  500 Units InterCATHeter DAILY     ______________________________________________________________________  EXPECTED LENGTH OF STAY: - - -  ACTUAL LENGTH OF STAY:          3                 Alba Leiva MD

## 2023-01-29 NOTE — PROGRESS NOTES
[facility] [address] [place] [address] Rd Note                 [x]Established visit     Patient Name: Patti Piper - Z:[id number]  Primary Cardiologist: Nina Pastrana MD  Consulting Cardiologist: Beth Falk MD     Reason for initial visit: tachycardia    HPI:      SUBJECTIVE:  71 y. o.man with ischemic cardiomyopathy and on life vest defibrillator  He is now less tired and dyspneic  Currently on milrinone 0.2  Atrial flutter was present on ECG 2:1 AV conduction resolved with amiodarone     Currently in sinus tachycardia 102 bpm and NSR    Endoscopy: polyp and gastric erythema     He had CABG and ischemic cardiomyopathy     He had been seen by Dr Ramez Pearson and Dejuan Diallo, no target for PCI any more  2 grafts patent   There is ongoing LVAD work ups  PFT pending       Assessment and Plan     Atrial flutter with rapid ventricular rate- terminated with IV amiodarone  Stopped corlanor  I explained to him and his daughter about risks and benefits of amiodarone  Short term he can use it. He is loaded day 3 now  Plan to reduce to 400 mg every day after total 14 days at 400 mg bid and then 200 mg every day  Agree with PFT and DLCO  Toprol XL 12.5 mg every day low dose  Long term it may cause lung and thyroid problem in him. Otherwise consider biventricular ICD and AV node ablation   If he is turned down for LVAD then I recommend above procedure.     Atrial fibrillation and atypical atrial flutter ablation can be considered but NSR may not last long and procedure is higher risk for him with general anesthesia and severe cardiomyopathy, low bp tendency  Heparin now but may consider eliquis low dose due to weight and renal failure  I may recommend Watchman device  I will follow up with him in office   He has life vest defibrillator at this time    Ischemic cardiomyopathy and acute on chronic systolic CHF- continue meds with Dr Indar Green and LVAD evaluation  On milrinone lower dose    CAD with troponin elevation- Dr Hans Mcpherson and Carla Gage reviewed previous cath film  No more revascularization can be done    Chronic anemia and renal failure    Lior Lopez M.D. [facility] [place]  Electrophysiology/Cardiology  [facility] [place]  [address] [facility], [address] [place] [address] Avenue                              [phone]                                                     ____________________________________________________________    Cardiac testing  12/22/22    ECHO ADULT FOLLOW-UP OR LIMITED 01/09/2023 1/9/2023    Interpretation Summary    Left Ventricle: Severely reduced left ventricular systolic function with a visually estimated EF of 25 - 30%. Left ventricle size is normal. Mildly increased wall thickness. Signed by: Haydee Whalen MD on 1/9/2023  4:18 PM        12/22/22    NUCLEAR CARDIAC AMYLOID SPECT 12/30/2022 12/30/2022    Narrative    Nuclear Cardiac Amyloid SPECT:  Equivocal for myocardial TTR amyloidosis. History: Cardiomyopathy, evaluate for amyloid. Radiotracer: 16.2 mCi technetium pyrophosphate IV. Comparison: None  Correlative imaging:  None    Nuclear SPECT and PLANAR cardiac images were obtained at least one hour postinjection. FINDINGS:  Semiquantitative visual grading of myocardial radiotracer uptake compared to osseous/rib uptake: Myocardial uptake less than rib uptake (visual score: 1).    H/CL ratio: 1.45    Impression  : Nuclear Cardiac Amyloid SPECT:  Equivocal for myocardial TTR amyloidosis. TTR Amyloidosis reference for interpretation:  A. Not suggestive: a semiquantitative visual score of 0 or H/CL ratio<1.  B: Strongly suggestive: A semiquantitative visual score of 2 or 3 or H/CL ratio >1.5. C. Equivocal: A semiquantitative visual score of 1 or H/CL ratio 1-1.5.     Signed by: Diana Ledezma MD on 12/30/2022 10:00 PM    12/22/22    CARDIAC PROCEDURE 01/16/2023 1/16/2023    Conclusion  Findings  1. Low filling pressures  2. Low normal cardiac output/cardiac index. Cardiac index is 2 L/min/m²    Access right internal jugular vein no issues    Recommendations  1. May hydrate if blood pressures are marginal  2. Continue guideline directed medical therapy if tolerated well    Signed by: Jaylene Zarate MD on 1/16/2023  4:00 PM        Most recent HS troponins:  Recent Labs     01/28/23  0526 01/27/23  1448 01/27/23  1009   TROPHS 2,512* 3,141* 3,286*       ECG: atrial flutter with RVR  Review of Systems    [x]All other systems reviewed and all negative except as written in HPI    [] Patient unable to provide secondary to condition         Past Medical History:   Diagnosis Date    CAD (coronary artery disease) 11/10/2016    NSTEMI & 2 stents    Deafness 10/28/2012    DM (diabetes mellitus) ([facility] [address]     Elevated cholesterol     Hypertension     NSTEMI (non-ST elevated myocardial infarction) ([facility] [address] 11/10/2016     Past Surgical History:   Procedure Laterality Date    COLONOSCOPY N/A 6/28/2018    COLONOSCOPY performed by Luisito Crump MD at [facility] ENDOSCOPY    COLONOSCOPY N/A 1/18/2023    COLONOSCOPY performed by Adrian Angel MD at [facility] ENDOSCOPY    [address]  11/11/2016    2 stents     Social Hx:  reports that he has never smoked. He has never been exposed to tobacco smoke. He has never used smokeless tobacco. He reports current alcohol use of about 2.0 standard drinks per week. He reports that he does not use drugs. Family Hx: family history includes Elevated Lipids in his brother, brother, and brother; Heart Attack in his father; Heart Disease in his father; Hypertension in his mother; No Known Problems in his daughter, sister, and son.   No Known Allergies       OBJECTIVE:  Wt Readings from Last 3 Encounters:   01/29/23 122 lb 2.2 oz (55.4 kg)   01/25/23 123 lb (55.8 kg)   01/23/23 121 lb (54.9 kg)       Intake/Output Summary (Last 24 hours) at 1/29/2023 1249  Last data filed at 1/29/2023 1000  Gross per 24 hour   Intake 1353.4 ml   Output 1700 ml   Net -346.6 ml           Physical Exam    Vitals:   Vitals:    01/29/23 1000 01/29/23 1033 01/29/23 1045 01/29/23 1200   BP:   (!) 104/44    Pulse: 97  (!) 107 (!) 105   Resp:   28    Temp:       SpO2:   100%    Weight:  122 lb 2.2 oz (55.4 kg)     Height:         Telemetry:  sinus tachycardia    Visit Vitals  BP (!) 104/44   Pulse (!) 105   Temp 98.6 °F (37 °C)   Resp 28   Ht 5' 9\" (1.753 m)   Wt 122 lb 2.2 oz (55.4 kg)   SpO2 100%   BMI 18.04 kg/m²     General Appearance:  thin,alert and oriented x 3, and individual in no acute distress. Ears/Nose/Mouth/Throat:   Hearing grossly normal.         Neck: Supple. Chest:   Lungs clear to auscultation bilaterally. Cardiovascular:  regular rhythm, mildly tachycardic no murmur. Abdomen:   Soft, flat   Extremities: No edema bilaterally. Skin: Warm and dry. Data Review:     Radiology:   XR Results (most recent):  Results from Hospital Encounter encounter on 01/26/23    XR CHEST PA LAT    Narrative  Indication:  sob    Exam: PA and lateral views of the chest.    Direct comparison is made to prior CXR dated January 1, 2023. Direct comparison  is also made to prior CT dated January 16, 2023. Findings: Cardiomediastinal silhouette is stable. Median sternotomy wires are  noted. Right-sided PICC line extends to the mid SVC. There are new diffuse  bilateral increased interstitial markings which are partially obscuring  bilateral pulmonary nodules. There is no pleural fluid. There is no  pneumothorax. Impression  New diffuse bilateral increased interstitial markings, partially  obscuring bilateral pulmonary nodules. CT can be performed for further  evaluation, as indicated.     CT Results (most recent):  Results from Hospital Encounter encounter on 12/22/22    CT CHEST WO CONT    Narrative  INDICATION: Shortness of breath with recent lung nodules. COMPARISON: 12/12/2022    CONTRAST: None. TECHNIQUE:  5 mm axial images were obtained through the chest. Coronal and  sagittal reformats were generated. CT dose reduction was achieved through use  of a standardized protocol tailored for this examination and automatic exposure  control for dose modulation. The absence of intravenous contrast reduces the sensitivity for evaluation of  the mediastinum, andreea, vasculature, and upper abdominal organs. FINDINGS:    CHEST WALL: Right subclavian central venous catheter. No axillary or  supraclavicular adenopathy. Median sternotomy changes. THYROID: No nodule. MEDIASTINUM: No mass or lymphadenopathy. ANDREEA: No mass or lymphadenopathy. THORACIC AORTA: No aneurysm. MAIN PULMONARY ARTERY: Normal in caliber. TRACHEA/BRONCHI: Unremarkable  ESOPHAGUS: No wall thickening or dilatation. HEART: Normal in size. Coronary artery calcium: present  PLEURA: No effusion or pneumothorax. LUNGS: Multiple groundglass pulmonary nodules scattered throughout the lungs  which have increased in number since prior study. Decreased bilateral pleural  effusions. INCIDENTALLY IMAGED UPPER ABDOMEN: Cholelithiasis  BONES: No destructive bone lesion. Impression  1. Small ground glass nodules scattered throughout the lungs. Findings favor  infectious etiology. Recommend short-term follow-up chest CT in 4-6 weeks. 2.  Cholelithiasis. Resolution of previous pleural effusions. MRI Results (most recent):  Results from [place] encounter on 12/22/22    MRI CARDIAC MORPH FUNC W WO CONT    Narrative  CARDIOVASCULAR MRI    INDICATION: suspected myocarditis. Dr. Laure Baker aware of need for MRI and planned  for Tuesday at [facility]:    CPT Codes: 97671    SCANS PERFORMED:  Spin Echo: Axial.  FIESTA/SSFP  LGE    Contrast Agent: ProHance. Contrast Dose: 15ml.     CLINICAL DATA:  HR = 103/min, BP = 81/61mmHg,  HT = 5 foot 9inc, WT = 108lb. Impression  1. Normal left ventricular cavity size. Severe left ventricular systolic  dysfunction. Severe hypokinesis of the base to mid and distal anterior wall,  anteroseptal wall, anteroapical wall, apical septal wall and apex. Mild  hypokinesis of the lateral wall. 3-D LVEF 23%. 2. Normal right ventricular size and systolic function. 3. Sclerotic aortic valve leaflets. Mild to moderate aortic valve stenosis. Aortic valve area is 1.3 sq cm by planimetry. 4. On T2 W imaging there is no significant myocardial edema seen. On EGE and LGE  study, there is patchy subendocardial infarct involving LAD territory including  mid to distal anterior wall, anteroseptal wall, anteroapical wall, apex, apical  septal wall involving less than 25% thickness of the myocardial wall with  thinning of the apex and apical septal wall. There is medium grade myocardial  viability of these walls. There is a small to medium size subendocardial infarct  of the basal inferior wall. There is mild patchy subepicardial enhancement of  the lateral wall suggestive of nonischemic etiology. There is no features of  infiltrative sarcoidosis or cardiac amyloidosis. 5. Normal pleura and pericardium. There is no significant effusions. Pericardium:  Normal. (<3.5mm)  Pericardial Effusion:  None. Pleural Effusion:  None. VOLUMETRIC DATA:    LVEDVI:  71 ml/m2 (Normal 47-92 mL/sq-m)  LVESVI:  55 ml/m2 (Normal 13-30 mL/sq-m)  LVSVI:  17 ml/m2 (Normal 32-62 mL/sq-m)  LVMI:  59 g/m2       Recent Labs     01/29/23  0048 01/28/23  1527 01/27/23  0052 01/26/23  2038   * 132*   < >  --    K 4.4 4.3   < >  --     101   < >  --    CO2 24 26   < >  --    BUN 24* 23*   < >  --    CREA 1.78* 1.58*   < >  --    * 380*   < >  --    PHOS  --   --   --  2.6   CA 8.8 8.4*   < >  --     < > = values in this interval not displayed.        Recent Labs     01/29/23  0048 01/28/23  6768 WBC 8.8 6.3   HGB 8.7* 8.0*   HCT 29.3* 26.5*    217       Recent Labs     01/27/23  0052 01/26/23  1651   * 128*       Current meds:    Current Facility-Administered Medications:     potassium chloride SR (KLOR-CON 10) tablet 20 mEq, 20 mEq, Oral, BID, Terrell STALEY, NP, 20 mEq at 01/29/23 0854    cefTRIAXone (ROCEPHIN) 1 g in 0.9% sodium chloride 10 mL IV syringe, 1 g, IntraVENous, Q24H, Kathy Brown MD, 1 g at 01/28/23 1445    doxycycline (VIBRAMYCIN) 100 mg in 0.9% sodium chloride (MBP/ADV) 100 mL MBP, 100 mg, IntraVENous, Q12H, Kathy Brown MD, Last Rate: 100 mL/hr at 01/29/23 0034, 100 mg at 01/29/23 0034    glucose chewable tablet 16 g, 4 Tablet, Oral, PRN, Ita Harvey NP    glucagon (GLUCAGEN) injection 1 mg, 1 mg, IntraMUSCular, PRN, Ita Harvey NP    dextrose 10 % infusion 0-250 mL, 0-250 mL, IntraVENous, PRN, Ita Harvey NP    insulin lispro (HUMALOG) injection, , SubCUTAneous, AC&HS, Ita Harvey NP, 2 Units at 01/29/23 7125    balsam peru-castor oiL (VENELEX) ointment, , Topical, BID, Syliva Deep, NP, Given at 01/29/23 6226    metoprolol succinate (TOPROL-XL) XL tablet 12.5 mg, 12.5 mg, Oral, Q12H, Malinda Mchugh MD, 12.5 mg at 01/29/23 0854    lidocaine 4 % patch 1 Patch, 1 Patch, TransDERmal, Q24H, Kathy Brown MD, 1 Patch at 01/28/23 1152    heparin 25,000 units in D5W 250 ml infusion, 12-25 Units/kg/hr, IntraVENous, TITRATE, Paul Rodriguez., NP, Last Rate: 10.9 mL/hr at 01/29/23 0033, 20 Units/kg/hr at 01/29/23 0033    amiodarone (CORDARONE) tablet 400 mg, 400 mg, Oral, BID, Gerard Lacy MD, 400 mg at 01/29/23 6845    aspirin delayed-release tablet 81 mg, 81 mg, Oral, DAILY, Heber Barajas MD, 81 mg at 01/29/23 0854    sodium chloride (NS) flush 5-40 mL, 5-40 mL, IntraVENous, Q8H, Heber Barajas MD, 10 mL at 01/29/23 9805    sodium chloride (NS) flush 5-40 mL, 5-40 mL, IntraVENous, PRN, Heber Barajas MD    acetaminophen (TYLENOL) tablet 650 mg, 650 mg, Oral, Q6H PRN, 650 mg at 01/28/23 0003 **OR** acetaminophen (TYLENOL) suppository 650 mg, 650 mg, Rectal, Q6H PRN, Heber Barajas MD    polyethylene glycol (MIRALAX) packet 17 g, 17 g, Oral, DAILY PRN, Heber Barajas MD    ondansetron (ZOFRAN ODT) tablet 4 mg, 4 mg, Oral, Q8H PRN **OR** ondansetron (ZOFRAN) injection 4 mg, 4 mg, IntraVENous, Q6H PRN, Heber Barajas MD    furosemide (LASIX) injection 40 mg, 40 mg, IntraVENous, BID, Heber Barajas MD, 40 mg at 01/29/23 0854    heparin (porcine) pf 500 Units, 500 Units, InterCATHeter, PRN, Heber Barajas MD    pantoprazole (PROTONIX) tablet 40 mg, 40 mg, Oral, ACB, Heber Barajas MD, 40 mg at 01/29/23 3687    rosuvastatin (CRESTOR) tablet 20 mg, 20 mg, Oral, QHS, Heber Barajas MD, 20 mg at 01/28/23 2209    zolpidem (AMBIEN) tablet 5 mg, 5 mg, Oral, QHS PRN, Heber Barajas MD, 5 mg at 01/28/23 2218    milrinone (PRIMACOR) 20 MG/100 ML D5W infusion, 0.2 mcg/kg/min, IntraVENous, CONTINUOUS, Heber Barajas MD, Last Rate: 3.3 mL/hr at 01/29/23 0632, 0.2 mcg/kg/min at 01/29/23 3323    heparin (porcine) pf 500 Units, 500 Units, InterCATHeter, DAILY, Heber Barajas MD, 500 Units at 01/29/23 0865    Hallie Duran MD  [facility] [address] [address]  [facility]  [phone]      Regulo Ybarra MD

## 2023-01-29 NOTE — PROGRESS NOTES
Problem: Diabetes Self-Management  Goal: *Disease process and treatment process  Description: Define diabetes and identify own type of diabetes; list 3 options for treating diabetes. Outcome: Progressing Towards Goal  Goal: *Incorporating nutritional management into lifestyle  Description: Describe effect of type, amount and timing of food on blood glucose; list 3 methods for planning meals. Outcome: Progressing Towards Goal  Goal: *Incorporating physical activity into lifestyle  Description: State effect of exercise on blood glucose levels. Outcome: Progressing Towards Goal  Goal: *Developing strategies to promote health/change behavior  Description: Define the ABC's of diabetes; identify appropriate screenings, schedule and personal plan for screenings. Outcome: Progressing Towards Goal  Goal: *Using medications safely  Description: State effect of diabetes medications on diabetes; name diabetes medication taking, action and side effects. Outcome: Progressing Towards Goal  Goal: *Monitoring blood glucose, interpreting and using results  Description: Identify recommended blood glucose targets  and personal targets. Outcome: Progressing Towards Goal  Goal: *Prevention, detection and treatment of chronic complications  Description: Define the natural course of diabetes and describe the relationship of blood glucose levels to long term complications of diabetes. Outcome: Progressing Towards Goal     Problem: Pressure Injury - Risk of  Goal: *Prevention of pressure injury  Description: Document Mike Scale and appropriate interventions in the flowsheet.   Outcome: Progressing Towards Goal  Note: Pressure Injury Interventions:  Sensory Interventions: Assess changes in LOC, Keep linens dry and wrinkle-free, Maintain/enhance activity level, Minimize linen layers    Moisture Interventions: Absorbent underpads, Limit adult briefs, Minimize layers    Activity Interventions: Assess need for specialty bed, Increase time out of bed    Mobility Interventions: HOB 30 degrees or less, Pressure redistribution bed/mattress (bed type)    Nutrition Interventions: Document food/fluid/supplement intake, Offer support with meals,snacks and hydration    Friction and Shear Interventions: Apply protective barrier, creams and emollients       Problem: Falls - Risk of  Goal: *Absence of Falls  Description: Document Laverne Fall Risk and appropriate interventions in the flowsheet.   Outcome: Progressing Towards Goal  Note: Fall Risk Interventions:     Problem: Pain  Goal: *Control of Pain  Outcome: Progressing Towards Goal

## 2023-01-30 ENCOUNTER — TRANSCRIBE ORDER (OUTPATIENT)
Dept: CARDIAC REHAB | Age: 73
End: 2023-01-30

## 2023-01-30 ENCOUNTER — TELEPHONE (OUTPATIENT)
Dept: FAMILY MEDICINE CLINIC | Age: 73
End: 2023-01-30

## 2023-01-30 DIAGNOSIS — I21.4 ACUTE MYOCARDIAL INFARCTION, SUBENDOCARDIAL INFARCTION, INITIAL EPISODE OF CARE (HCC): Primary | ICD-10-CM

## 2023-01-30 NOTE — PROGRESS NOTES
2000: Patient 's RR 40's lungs wet. 2 L NC applied. Cristina Middleton NP notified orders received for lasix 40mg.  2200: Still SOB anxious. Cristina Middleton NP at bedside patient placed on Bipap.  0000: Bipap removed per patient request. States \" I feel much better\" RR low 20's, HR 90's-100s. Appears comfortable. 0530: Vasquez emptied 1600 out. 0630: Patient bathed and up in chair.

## 2023-01-30 NOTE — PROGRESS NOTES
Problem: Self Care Deficits Care Plan (Adult)  Goal: *Acute Goals and Plan of Care (Insert Text)  Description:   FUNCTIONAL STATUS PRIOR TO ADMISSION: Patient required minimal assistance for basic and instrumental ADLs. Used rollator for functional mobility, had HHA and HH OT/PT upon discharge. HOME SUPPORT: The patient lived with wife and family members. Occupational Therapy Goals  Initiated 1/30/2023  1. Patient will perform grooming with supervision/set-up within 7 day(s). 2.  Patient will perform upper body dressing with supervision/set-up within 7 day(s). 3.  Patient will perform lower body dressing with supervision/set-up within 7 day(s). 4.  Patient will perform all aspects of toileting with supervision/set-up within 7 day(s). 5.  Patient will utilize energy conservation techniques during functional activities with verbal cues within 7 day(s). Outcome: Progressing Towards Goal   OCCUPATIONAL THERAPY EVALUATION  Patient: Jem Dillard (76 y.o. male)  Date: 1/30/2023  Primary Diagnosis: CHF (congestive heart failure) ([facility] [address] [I50.9]       Precautions:  Fall    ASSESSMENT  Based on the objective data described below, the patient presents with impaired balance, strength, activity tolerance, and ability to complete ADL/functional mobility at baseline. Patient admitted for increased LE edema and tachycardia, was receiving HH PT/OT and HHA support at home. Currently requiring x1 assist for ADL/functional mobility with RW. Able to don/doff socks seated EOB with CGA. Soft BPs throughout session, however steady throughout, HR stable 100-105. At this time, anticipate patient will benefit from [facility] and family support upon discharge to maximize ADL independence and safety. Current Level of Function Impacting Discharge (ADLs/self-care): up to x1 assist mobility/ADL    Functional Outcome Measure:   The patient scored Total: 60/100 on the Barthel Index outcome measure which is indicative of 40% impaired ability to care for basic self needs/dependency on others; inferred 50% dependency on others for instrumental ADLs. Other factors to consider for discharge: below baseline, continued LVAD workup, tachycardia requiring hospital admission     Patient will benefit from skilled therapy intervention to address the above noted impairments. PLAN :  Recommendations and Planned Interventions: self care training, functional mobility training, therapeutic exercise, balance training, therapeutic activities, endurance activities, patient education, home safety training, and family training/education    Frequency/Duration: Patient will be followed by occupational therapy 5 times a week to address goals. Recommendation for discharge: (in order for the patient to meet his/her long term goals)  Occupational therapy at least 2 days/week in the home     This discharge recommendation:  Has been made in collaboration with the attending provider and/or case management    IF patient discharges home will need the following DME: TBD pending progress       SUBJECTIVE:   Patient stated Matthias Navarro was too weak to do things by myself at home.     OBJECTIVE DATA SUMMARY:   HISTORY:   Past Medical History:   Diagnosis Date    CAD (coronary artery disease) 11/10/2016    NSTEMI & 2 stents    Deafness 10/28/2012    DM (diabetes mellitus) ([facility])     Elevated cholesterol     Hypertension     NSTEMI (non-ST elevated myocardial infarction) ([facility] [address] 11/10/2016     Past Surgical History:   Procedure Laterality Date    COLONOSCOPY N/A 6/28/2018    COLONOSCOPY performed by Alida Chase MD at [facility] ENDOSCOPY    COLONOSCOPY N/A 1/18/2023    COLONOSCOPY performed by Tru Baptiste MD at [address] [place] [address]  11/11/2016    2 stents       Expanded or extensive additional review of patient history:     Home Situation  Home Environment: Private residence  # Steps to Enter: 12  Rails to Enter: Yes  Hand Rails : Left  Wheelchair Ramp: No  One/Two Story Residence: Two story  # of Interior Steps: 12  Interior Rails: Left  Living Alone: No  Support Systems: Spouse/Significant Other  Patient Expects to be Discharged to[de-identified] Home  Current DME Used/Available at Home: Walker, rollator  Tub or Shower Type: Shower    Hand dominance: Right    EXAMINATION OF PERFORMANCE DEFICITS:  Cognitive/Behavioral Status:  Neurologic State: Alert  Orientation Level: Oriented X4  Cognition: Appropriate decision making; Follows commands; Appropriate safety awareness  Perception: Appears intact  Perseveration: No perseveration noted  Safety/Judgement: Awareness of environment; Insight into deficits    Skin: intact where visible    Edema: none noted    Hearing: Auditory  Auditory Impairment: Hard of hearing, bilateral, Hearing aid(s)  Hearing Aids/Status: Left (does not have R)    Vision/Perceptual:                           Acuity: Within Defined Limits         Range of Motion:  AROM: Generally decreased, functional                         Strength:  Strength: Generally decreased, functional                Coordination:  Coordination: Within functional limits  Fine Motor Skills-Upper: Left Intact; Right Intact    Gross Motor Skills-Upper: Left Intact; Right Intact    Tone & Sensation:  Tone: Normal  Sensation: Intact                      Balance:  Sitting: Intact  Sitting - Static: Good (unsupported)  Sitting - Dynamic: Good (unsupported)  Standing: Impaired  Standing - Static: Constant support;Good  Standing - Dynamic : Constant support;Good    Functional Mobility and Transfers for ADLs:  Bed Mobility:       Transfers:  Sit to Stand: Contact guard assistance  Stand to Sit: Contact guard assistance    ADL Assessment:  Feeding: Independent    Oral Facial Hygiene/Grooming: Supervision    Bathing: Minimum assistance    Upper Body Dressing: Contact guard assistance    Lower Body Dressing: Contact guard assistance    Toileting: Contact guard assistance ADL Intervention and task modifications:                           Lower Body Dressing Assistance  Socks: Contact guard assistance  Leg Crossed Method Used: Yes  Position Performed: Seated edge of bed         Cognitive Retraining  Safety/Judgement: Awareness of environment; Insight into deficits    Functional Measure:  Barthel Index:    Bathin  Bladder: 0  Bowels: 10  Groomin  Dressin  Feeding: 10  Mobility: 10  Stairs: 5  Toilet Use: 5  Transfer (Bed to Chair and Back): 10  Total: 60/100        The Barthel ADL Index: Guidelines  1. The index should be used as a record of what a patient does, not as a record of what a patient could do. 2. The main aim is to establish degree of independence from any help, physical or verbal, however minor and for whatever reason. 3. The need for supervision renders the patient not independent. 4. A patient's performance should be established using the best available evidence. Asking the patient, friends/relatives and nurses are the usual sources, but direct observation and common sense are also important. However direct testing is not needed. 5. Usually the patient's performance over the preceding 24-48 hours is important, but occasionally longer periods will be relevant. 6. Middle categories imply that the patient supplies over 50 per cent of the effort. 7. Use of aids to be independent is allowed. Pablito Zee., Barthel, D.W. (8253). Functional evaluation: the Barthel Index. [address] [facility] (14)2. JOSUE Weaver, Miriam Jones., Madai Six., Ernie Latin, [facility] (). Measuring the change indisability after inpatient rehabilitation; comparison of the responsiveness of the Barthel Index and Functional [place] Measure. Journal of Neurology, Neurosurgery, and Psychiatry, 66(4), 929-424. Charisse De Leon, [place].JESSE, PIPE Guzman, & Ana Peterson MJustinoA. (2004.) Assessment of post-stroke quality of life in cost-effectiveness studies:  The usefulness of the Barthel Index and the EuroQoL-5D. Quality of Life Research, 15, 372-19         Occupational Therapy Evaluation Charge Determination   History Examination Decision-Making   MEDIUM Complexity : Expanded review of history including physical, cognitive and psychosocial  history  MEDIUM Complexity : 3-5 performance deficits relating to physical, cognitive , or psychosocial skils that result in activity limitations and / or participation restrictions LOW Complexity : No comorbidities that affect functional and no verbal or physical assistance needed to complete eval tasks       Based on the above components, the patient evaluation is determined to be of the following complexity level: LOW   Pain Rating:  None reported    Activity Tolerance:   Fair and requires rest breaks    After treatment patient left in no apparent distress:    Supine in bed, Heels elevated for pressure relief, Call bell within reach, Bed / chair alarm activated, Caregiver / family present, and Side rails x 3    COMMUNICATION/EDUCATION:   The patients plan of care was discussed with: Physical therapist and Registered nurse. Home safety education was provided and the patient/caregiver indicated understanding., Patient/family have participated as able in goal setting and plan of care. , and Patient/family agree to work toward stated goals and plan of care. This patients plan of care is appropriate for delegation to [facility].     Thank you for this referral.  Angle Vines, OT

## 2023-01-30 NOTE — PROGRESS NOTES
Problem: Diabetes Self-Management  Goal: *Disease process and treatment process  Description: Define diabetes and identify own type of diabetes; list 3 options for treating diabetes. Outcome: Progressing Towards Goal  Goal: *Incorporating nutritional management into lifestyle  Description: Describe effect of type, amount and timing of food on blood glucose; list 3 methods for planning meals. Outcome: Progressing Towards Goal  Goal: *Incorporating physical activity into lifestyle  Description: State effect of exercise on blood glucose levels. Outcome: Progressing Towards Goal  Goal: *Developing strategies to promote health/change behavior  Description: Define the ABC's of diabetes; identify appropriate screenings, schedule and personal plan for screenings. Outcome: Progressing Towards Goal  Goal: *Using medications safely  Description: State effect of diabetes medications on diabetes; name diabetes medication taking, action and side effects. Outcome: Progressing Towards Goal  Goal: *Monitoring blood glucose, interpreting and using results  Description: Identify recommended blood glucose targets  and personal targets. Outcome: Progressing Towards Goal  Goal: *Prevention, detection, treatment of acute complications  Description: List symptoms of hyper- and hypoglycemia; describe how to treat low blood sugar and actions for lowering  high blood glucose level. Outcome: Progressing Towards Goal  Goal: *Prevention, detection and treatment of chronic complications  Description: Define the natural course of diabetes and describe the relationship of blood glucose levels to long term complications of diabetes.   Outcome: Progressing Towards Goal  Goal: *Developing strategies to address psychosocial issues  Description: Describe feelings about living with diabetes; identify support needed and support network  Outcome: Progressing Towards Goal  Goal: *Insulin pump training  Outcome: Progressing Towards Goal  Goal: *Sick day guidelines  Outcome: Progressing Towards Goal  Goal: *Patient Specific Goal (EDIT GOAL, INSERT TEXT)  Outcome: Progressing Towards Goal     Problem: Patient Education: Go to Patient Education Activity  Goal: Patient/Family Education  Outcome: Progressing Towards Goal     Problem: Pressure Injury - Risk of  Goal: *Prevention of pressure injury  Description: Document Mike Scale and appropriate interventions in the flowsheet. Outcome: Progressing Towards Goal  Note: Pressure Injury Interventions:  Sensory Interventions: Assess need for specialty bed, Avoid rigorous massage over bony prominences, Monitor skin under medical devices, Pad between skin to skin, Turn and reposition approx. every two hours (pillows and wedges if needed)    Moisture Interventions: Apply protective barrier, creams and emollients, Assess need for specialty bed, Moisture barrier, Maintain skin hydration (lotion/cream), Minimize layers    Activity Interventions: Increase time out of bed, Pressure redistribution bed/mattress(bed type)    Mobility Interventions: HOB 30 degrees or less, Pressure redistribution bed/mattress (bed type)    Nutrition Interventions: Document food/fluid/supplement intake    Friction and Shear Interventions: Apply protective barrier, creams and emollients, Lift team/patient mobility team, Minimize layers                Problem: Patient Education: Go to Patient Education Activity  Goal: Patient/Family Education  Outcome: Progressing Towards Goal     Problem: Falls - Risk of  Goal: *Absence of Falls  Description: Document Laverne Fall Risk and appropriate interventions in the flowsheet.   Outcome: Progressing Towards Goal  Note: Fall Risk Interventions:  Mobility Interventions: Bed/chair exit alarm, Communicate number of staff needed for ambulation/transfer, PT Consult for mobility concerns, PT Consult for assist device competence         Medication Interventions: Evaluate medications/consider consulting pharmacy, Patient to call before getting OOB, Teach patient to arise slowly    Elimination Interventions: Call light in reach, Elevated toilet seat, Toilet paper/wipes in reach    History of Falls Interventions: Consult care management for discharge planning, Door open when patient unattended, Room close to nurse's station, Utilize gait belt for transfer/ambulation         Problem: Patient Education: Go to Patient Education Activity  Goal: Patient/Family Education  Outcome: Progressing Towards Goal     Problem: Pain  Goal: *Control of Pain  Outcome: Progressing Towards Goal  Goal: *PALLIATIVE CARE:  Alleviation of Pain  Outcome: Progressing Towards Goal     Problem: Patient Education: Go to Patient Education Activity  Goal: Patient/Family Education  Outcome: Progressing Towards Goal     Problem: Patient Education: Go to Patient Education Activity  Goal: Patient/Family Education  Outcome: Progressing Towards Goal

## 2023-01-30 NOTE — TELEPHONE ENCOUNTER
----- Message from Lewis Lala sent at 1/30/2023  1:17 PM EST -----  Subject: Message to Provider    QUESTIONS  Information for Provider? Salvador Coleman from [facility] just wanted to make sure Doctor   was aware of current hospital admission for patient at [facility]   on 1/26/23.  ---------------------------------------------------------------------------  --------------  [email or web address]  [phone]; OK to leave message on voicemail  ---------------------------------------------------------------------------  --------------  SCRIPT ANSWERS  Relationship to Patient? Third Party  Third Party Type? Insurance? Representative Name?  CRISTA

## 2023-01-30 NOTE — PROGRESS NOTES
[address] [facility].  [address] [address] [address], [address]   [phone]                     GI PROGRESS NOTE    Patient Name: Lindy Vincent      : 1950      MRN: [id number]  Admit Date: 2023  Today's Date: 2023  CC: `neuroendocrine tumor    Subjective:     Mr. Sonu Le  is laying in bed with his wife at bedside. He is tolerating diet. Denies N/V or abdominal pain. They are understandably anxious to about path report and what that means for LVAD placement. I notified them that oncology has been consulted and HF team will decide if LVAD is an option for him given new diagnosis. Objective:     Blood pressure (!) 94/48, pulse (!) 102, temperature 98.5 °F (36.9 °C), resp. rate 27, height 5' 9\" (1.753 m), weight 55.4 kg (122 lb 3.2 oz), SpO2 97 %. Physical Exam:  General appearance: cooperative, no distress, appears stated age  Skin: Extremities and face reveal no rashes. HEENT: Sclerae anicteric. Cardiovascular: Regular rate and rhythm. Respiratory: Comfortable breathing with no accessory muscle use   GI: Abdomen distended, soft, and nontender. Normal active bowel sounds. Rectal: Deferred   Musculoskeletal: No pitting edema of the lower legs. Neurological: Patient is alert and oriented. Psychiatric:  No anxiety or agitation.       Data Review:    Recent Results (from the past 24 hour(s))   ANTI-XA UNFRACTIONATED AND LMW HEPARIN    Collection Time: 23  4:27 PM   Result Value Ref Range    Heparin Xa,Unfrac. and LMW 0.35 IU/mL   GLUCOSE, POC    Collection Time: 23  5:04 PM   Result Value Ref Range    Glucose (POC) 385 (H) 65 - 117 mg/dL    Performed by  [facility] [address], POC    Collection Time: 23  6:40 PM   Result Value Ref Range    Glucose (POC) 398 (H) 65 - 117 mg/dL    Performed by  [facility] [address], POC    Collection Time: 23  9:08 PM   Result Value Ref Range    Glucose (POC) 374 (H) 65 - 117 mg/dL Performed by [email or web address]    COVID-19 RAPID TEST    Collection Time: 01/30/23 12:15 AM   Result Value Ref Range    Specimen source Nasopharyngeal      COVID-19 rapid test Not detected NOTD     ANTI-XA UNFRACTIONATED AND LMW HEPARIN    Collection Time: 01/30/23  3:53 AM   Result Value Ref Range    Heparin Xa,Unfrac. and LMW 0.26 IU/mL   DIGOXIN    Collection Time: 01/30/23  3:53 AM   Result Value Ref Range    Digoxin level 1.1 0.90 - 2.00 NG/ML   MAGNESIUM    Collection Time: 01/30/23  3:53 AM   Result Value Ref Range    Magnesium 1.7 1.6 - 2.4 mg/dL   CBC WITH AUTOMATED DIFF    Collection Time: 01/30/23  3:53 AM   Result Value Ref Range    WBC 8.0 4.1 - 11.1 K/uL    RBC 3.48 (L) 4.10 - 5.70 M/uL    HGB 8.5 (L) 12.1 - 17.0 g/dL    HCT 29.5 (L) 36.6 - 50.3 %    MCV 84.8 80.0 - 99.0 FL    MCH 24.4 (L) 26.0 - 34.0 PG    MCHC 28.8 (L) 30.0 - 36.5 g/dL    RDW 23.4 (H) 11.5 - 14.5 %    PLATELET 585 067 - 935 K/uL    MPV 10.9 8.9 - 12.9 FL    NRBC 0.2 (H) 0  WBC    ABSOLUTE NRBC 0.02 (H) 0.00 - 0.01 K/uL    NEUTROPHILS 64 32 - 75 %    LYMPHOCYTES 23 12 - 49 %    MONOCYTES 10 5 - 13 %    EOSINOPHILS 1 0 - 7 %    BASOPHILS 1 0 - 1 %    IMMATURE GRANULOCYTES 1 (H) 0.0 - 0.5 %    ABS. NEUTROPHILS 5.1 1.8 - 8.0 K/UL    ABS. LYMPHOCYTES 1.8 0.8 - 3.5 K/UL    ABS. MONOCYTES 0.8 0.0 - 1.0 K/UL    ABS. EOSINOPHILS 0.1 0.0 - 0.4 K/UL    ABS. BASOPHILS 0.1 0.0 - 0.1 K/UL    ABS. IMM.  GRANS. 0.1 (H) 0.00 - 0.04 K/UL    DF SMEAR SCANNED      RBC COMMENTS ANISOCYTOSIS  3+        RBC COMMENTS HYPOCHROMIA  1+        RBC COMMENTS OVALOCYTES  PRESENT       METABOLIC PANEL, BASIC    Collection Time: 01/30/23  3:53 AM   Result Value Ref Range    Sodium 131 (L) 136 - 145 mmol/L    Potassium 4.8 3.5 - 5.1 mmol/L    Chloride 101 97 - 108 mmol/L    CO2 23 21 - 32 mmol/L    Anion gap 7 5 - 15 mmol/L    Glucose 261 (H) 65 - 100 mg/dL    BUN 27 (H) 6 - 20 MG/DL    Creatinine 1.88 (H) 0.70 - 1.30 MG/DL    BUN/Creatinine ratio 14 12 - 20      eGFR 38 (L) >60 ml/min/1.73m2    Calcium 9.0 8.5 - 10.1 MG/DL   NT-PRO BNP    Collection Time: 01/30/23  3:53 AM   Result Value Ref Range    NT pro-BNP 7,872 (H) <125 PG/ML   GLUCOSE, POC    Collection Time: 01/30/23  6:38 AM   Result Value Ref Range    Glucose (POC) 287 (H) 65 - 117 mg/dL    Performed by Radha Chatterjee    GLUCOSE, POC    Collection Time: 01/30/23 11:26 AM   Result Value Ref Range    Glucose (POC) 293 (H) 65 - 117 mg/dL    Performed by Brianda Mccoy  PCT          Assessment / Plan :     Mr. Giana Valente was initially referred to our service for EGD/colonoscopy as part of his LVAD workup. Incidental finding on EGD pathology that indicated well- differentiated neuroendocrine tumor G1. We have been re-consulted to evaluate new diagnosis. Colonoscopy   Findings:  Rectum: normal  Sigmoid: normal  Descending Colon: normal  Transverse Colon: normal  Ascending Colon: normal  Cecum: normal    EGD   Findings:  Esophagus:normal  Stomach:Patchy erythema gastric body, biopsies done. 6 mm sessile polyp gastric body biopsied  Duodenum/jejunum:normal    EGD Pathology (1/18/23):    1. Stomach, body; biopsy:        Well-differentiated neuroendocrine tumor, G1; (see comment). Chronic active gastritis with intestinal metaplasia; no epithelial   dysplasia noted. No H. pylori-type organisms identified on H&E and IHC stained sections. 2. Stomach, polyp; polypectomy:        Well-differentiated neuroendocrine tumor, G1; (see comment). Chronic active gastritis with intestinal metaplasia, surface erosion   and reactive foveolar hyperplasia; no epithelial dysplasia identified. No H. pylori-type organisms identified on H&E and IHC stained sections.     - Oncology consulted  - Supportive care per hospitalist   - GI following          Patient C/Peña Billingsley [address]686 Problem List:   Active Problems:    CHF (congestive heart failure) ([address] (12/12/2022)        Time Spent with Patient: [address], NP

## 2023-01-30 NOTE — PROGRESS NOTES
[address] [facility] Adult  Hospitalist Group                                                                                          Hospitalist Progress Note  Laura Johnson MD  Answering service: [phone] OR 10 from in house phone        Date of Service:  2023  NAME:  Nader Clark  :  1950  MRN:  [id number]       Admission Summary:   HPI: \"Neal Kendall is a 71 y.o. male with history of ischemic cardiomyopathy, CAD status post PCI x2 HFrEF EF 25-30 stage D, NYHA class IIIb recently placed on milrinone drip as bridge to LVAD therapy, type 2 diabetes, hypertension, dyslipidemia, BPH, dyslipidemia, TRISTAN, CKD stage III who presents to hospital with complaints of elevated heart rate, difficulty with urination. Patient and wife state he had felt only mild improvement since discharge from hospital after his recent hospitalization. He was scheduled for outpatient voiding trial with urology and had Vasquez catheter removed. Wife states patient was able to void about 140 mL. Wife states since discharge, she has noted orthopnea, persistent tachycardia with heart rates in 120s and low BPs with systolics in the 43S as well as bilateral lower extremity edema. Patient however states he has not experienced any significant breathing issues. The patient denies any fever, chills, chest or abdominal pain, nausea, vomiting, cough, congestion, recent illness, palpitations, or dysuria. Remarkable vitals on ER Presentation: Heart rate 126  Labs Remarkable for: HGB 8.6, TROP 2033, glu 198, cr 1.36, bnp   ER Images: cxr: New diffuse bilateral increased interstitial markings, partially  obscuring bilateral pulmonary nodules. CT can be performed for further  evaluation, as indicated. \"       Interval history / Subjective:   Patient seen examined at bedside earlier. Had episode of tachycardia and sob overnight was briefly placed on bipap, now stable on RA.   Daughter and wife are present bedside Assessment & Plan:      Decompensated HFrEF LVEF 25-30% / Ischemic Cardiomyopathy / Milrinone gtt  CAD s/p CABG x2  Aflutter RVR   NSTEMI  -Continue amiodarone po stable off amio gtt . Appreciate EP, cardio and AHF  -cont milrinone gtt   -Appreciate EP plan to reduce amnio 40 mg every day after total 14 days at 400 mg twice daily then 200 daily  -if not candidate for lvad recommend biventricular ICD and AV node ablation, defer to cardio/ahf or timing of this   -Continue diuresis with Lasix 40 mg IV twice daily, need to watch kidney function closely as cr has slight uptrend   -stopped dig   -Appreciate EP recs stop heparin gtt start Eliquis  -Follow lytes goal K greater than 4, mag greater than 2  -Strict I's and O's  - toprol 12.5 bid, cannot tolerate ACE/ARB due to hypotension, cannot tolerate spironolactone secondary to hyperkalemia, cannot tolerate Jardiance due to significant diuresis lower doses  -stopped corlanor due to above  On lifevest   -PTOT  -lvad workup per AHF    Fever  CAP  100.7 1/28  - CXR on admission shows diffuse focal opacities concerning for pneumonia  Blood cultures no growth to date urine culture neg, ua likely contaminate   - CT chest 1/29: showed pulmonary edema and focal opacities bilaterally consistent with pneumonia as well as CHF  - COVID-negative, flu mycoplasma neg, legionella pending  - cont Rocephin, Doxy treat for 5 days last dose 2/2    + Pathology report 1/18 Neuroendocrine tumor  - Biopsy from EGD came back with well differentiated neuroendocrine tumor grade 1  - oncology consulted, awaiting eval for further input on prognosis/treatment and candidacy for vad given this finding. ..     BPH with urinary retention  -Failed recent voiding trial  -donnelly in place, to remain   -urology consulted, still awaiting eval      Gurpreet on CKD stage III   -  Monitor with IV diuresis, could also be cardio renal, will consider nephro involvement if worsens   GERD -chronic Continue PPI     PAD  / S/p Right SFA PTCA -followed by Dr. Sharlee Kocher   -Normal ABIs on recent admission     Critically ill, extremely high risk for decompensation. CCT time 45 minutes    Spoke to patient wife and daughter at bedside about plan     Depending on recommendations from oncology, will likely need to get palliative involvement as well to discuss goals of care     Code status: full   Prophylaxis: scd  Care Plan discussed with: patient/family at bedside, nurse, case management  Anticipated Disposition: Greater than 48 hours   Cardiac monitoring: xTelemetry   Inpatient      Hospital Problems  Date Reviewed: 1/24/2023            Codes Class Noted POA    [facility] (congest[facility] heart failure) ([facility] ICD-10-CM: I50.9  ICD-9-CM: 428.0  12/12/2022 Unknown         Social Determinants of Health     Tobacco Use: Low Risk     Smoking Tobacco Use: Never    Smokeless Tobacco Use: Never    Passive Exposure: Never   Alcohol Use: Not on file   Financial Resource Strain: Medium Risk    Difficulty of Paying Living Expenses: Somewhat hard   Food Insecurity: Food Insecurity Present    Worried About Running Out of Food in the Last Year: Never true    Ran Out of Food in the Last Year: Often true   Transportation Needs: Not on file   Physical Activity: Not on file   Stress: Not on file   Social Connections: Not on file   Intimate Partner Violence: Not on file   Depression: Not at risk    PHQ-2 Score: 0   Housing Stability: Not on file       Review of Systems:   A comprehensive review of systems was negative except for that written in the HPI. Vital Signs:    Last 24hrs VS reviewed since prior progress note. Most recent are:  Visit Vitals  BP (!) 100/39 (BP 1 Location: Left upper arm, BP Patient Position: Semi fowlers; Other (Comment))   Pulse 98   Temp 98.3 °F (36.8 °C)   Resp 25   Ht 5' 9\" (1.753 m)   Wt 55.4 kg (122 lb 3.2 oz)   SpO2 100%   BMI 18.05 kg/m²         Intake/Output Summary (Last 24 hours) at 1/30/2023 1352  Last data filed at 1/30/2023 1105  Gross per 24 hour   Intake 771.1 ml   Output 2000 ml   Net -1228.9 ml          Physical Examination:     I had a face to face encounter with this patient and independently examined them on 1/30/2023 as outlined below:          Constitutional:  No acute distress, cooperative, pleasant    ENT:  Oral mucosa moist, oropharynx benign. Resp:  Crackles bl bases. No wheezing/rhonchi/rales. No accessory muscle use. CV:  Regular rhythm, normal rate, no murmurs, gallops, rubs    GI:  Soft, non distended, non tender. normoactive bowel sounds, no hepatosplenomegaly     Musculoskeletal:  trace edema, warm, 2+ pulses throughout    Neurologic:  Moves all extremities. AAOx3, CN II-XII reviewed            Data Review:    Review and/or order of clinical lab test  Review and/or order of tests in the radiology section of CPT  Review and/or order of tests in the medicine section of CPT    I have independently reviewed and interpreted patient's lab and all other diagnostic data    Labs:     Recent Labs     01/30/23  0353 01/29/23 0048   WBC 8.0 8.8   HGB 8.5* 8.7*   HCT 29.5* 29.3*    270       Recent Labs     01/30/23  0353 01/29/23 0048 01/28/23  1527   * 132* 132*   K 4.8 4.4 4.3    102 101   CO2 23 24 26   BUN 27* 24* 23*   CREA 1.88* 1.78* 1.58*   * 280* 380*   CA 9.0 8.8 8.4*   MG 1.7 1.9 1.7       No results for input(s): ALT, AP, TBIL, TBILI, TP, ALB, GLOB, GGT, AML, LPSE in the last 72 hours. No lab exists for component: SGOT, GPT, AMYP, HLPSE    Recent Labs     01/27/23  1434   APTT 51.7*        No results for input(s): FE, TIBC, PSAT, FERR in the last 72 hours. Lab Results   Component Value Date/Time    Folate 10.5 07/03/2018 09:24 AM        No results for input(s): PH, PCO2, PO2 in the last 72 hours. No results for input(s): CPK, CKNDX, TROIQ in the last 72 hours.     No lab exists for component: CPKMB  Lab Results   Component Value Date/Time    Cholesterol, total 170 01/12/2023 05:00 AM    HDL Cholesterol 40 01/12/2023 05:00 AM    LDL, calculated 87 01/12/2023 05:00 AM    Triglyceride 215 (H) 01/12/2023 05:00 AM    CHOL/HDL Ratio 4.3 01/12/2023 05:00 AM     Lab Results   Component Value Date/Time    Glucose (POC) 293 (H) 01/30/2023 11:26 AM    Glucose (POC) 287 (H) 01/30/2023 06:38 AM    Glucose (POC) 374 (H) 01/29/2023 09:08 PM    Glucose (POC) 398 (H) 01/29/2023 06:40 PM    Glucose (POC) 385 (H) 01/29/2023 05:04 PM     Lab Results   Component Value Date/Time    Color YELLOW/STRAW 01/28/2023 12:11 PM    Appearance HAZY (A) 01/28/2023 12:11 PM    Specific gravity 1.010 01/28/2023 12:11 PM    Specific gravity 1.013 01/01/2023 09:13 AM    pH (UA) 5.5 01/28/2023 12:11 PM    Protein TRACE (A) 01/28/2023 12:11 PM    Glucose 100 (A) 01/28/2023 12:11 PM    Ketone Negative 01/28/2023 12:11 PM    Bilirubin Negative 01/28/2023 12:11 PM    Urobilinogen 1.0 01/28/2023 12:11 PM    Nitrites Negative 01/28/2023 12:11 PM    Leukocyte Esterase MODERATE (A) 01/28/2023 12:11 PM    Epithelial cells FEW 01/28/2023 12:11 PM    Bacteria Negative 01/28/2023 12:11 PM    WBC 5-10 01/28/2023 12:11 PM    RBC  01/28/2023 12:11 PM       Notes reviewed from all clinical/nonclinical/nursing services involved in patient's clinical care. Care coordination discussions were held with appropriate clinical/nonclinical/ nursing providers based on care coordination needs. Patients current active Medications were reviewed, considered, added and adjusted based on the clinical condition today. Home Medications were reconciled to the best of my ability given all available resources at the time of admission. Route is PO if not otherwise noted.       Medications Reviewed:     Current Facility-Administered Medications   Medication Dose Route Frequency    apixaban (ELIQUIS) tablet 2.5 mg  2.5 mg Oral BID    potassium chloride SR (KLOR-CON 10) tablet 20 mEq  20 mEq Oral BID    cefTRIAXone (ROCEPHIN) 1 g in 0.9% sodium chloride 10 mL IV syringe  1 g IntraVENous Q24H    doxycycline (VIBRAMYCIN) 100 mg in 0.9% sodium chloride (MBP/ADV) 100 mL MBP  100 mg IntraVENous Q12H    glucose chewable tablet 16 g  4 Tablet Oral PRN    glucagon (GLUCAGEN) injection 1 mg  1 mg IntraMUSCular PRN    dextrose 10 % infusion 0-250 mL  0-250 mL IntraVENous PRN    insulin lispro (HUMALOG) injection   SubCUTAneous AC&HS    balsam peru-castor oiL (VENELEX) ointment   Topical BID    metoprolol succinate (TOPROL-XL) XL tablet 12.5 mg  12.5 mg Oral Q12H    lidocaine 4 % patch 1 Patch  1 Patch TransDERmal Q24H    amiodarone (CORDARONE) tablet 400 mg  400 mg Oral BID    aspirin delayed-release tablet 81 mg  81 mg Oral DAILY    sodium chloride (NS) flush 5-40 mL  5-40 mL IntraVENous Q8H    sodium chloride (NS) flush 5-40 mL  5-40 mL IntraVENous PRN    acetaminophen (TYLENOL) tablet 650 mg  650 mg Oral Q6H PRN    Or    acetaminophen (TYLENOL) suppository 650 mg  650 mg Rectal Q6H PRN    polyethylene glycol (MIRALAX) packet 17 g  17 g Oral DAILY PRN    ondansetron (ZOFRAN ODT) tablet 4 mg  4 mg Oral Q8H PRN    Or    ondansetron (ZOFRAN) injection 4 mg  4 mg IntraVENous Q6H PRN    furosemide (LASIX) injection 40 mg  40 mg IntraVENous BID    heparin (porcine) pf 500 Units  500 Units InterCATHeter PRN    pantoprazole (PROTONIX) tablet 40 mg  40 mg Oral ACB    rosuvastatin (CRESTOR) tablet 20 mg  20 mg Oral QHS    zolpidem (AMBIEN) tablet 5 mg  5 mg Oral QHS PRN    milrinone (PRIMACOR) 20 MG/100 ML D5W infusion  0.2 mcg/kg/min IntraVENous CONTINUOUS    heparin (porcine) pf 500 Units  500 Units InterCATHeter DAILY     ______________________________________________________________________  EXPECTED LENGTH OF STAY: 4d 2h  ACTUAL LENGTH OF STAY:          4                 Cathi Grant MD

## 2023-01-30 NOTE — WOUND CARE
WOCN Note:     Re-consulted for right heel. Chart shows:  Admitted on 12/22/22. Admitted for sepsis, cardiogenic shock, respiratory failure. History of MI, CABG x3, DM, CHF. Assessment:   Sitting on side of bed with PT. Conversational with family in room. left heel intact and without erythema. 1. POA right heel deep tissue injury  3 x 3 x 0 cm  100% non-blanching purple; no blistering or skin sliding or induration or fluctuance    Wound Recommendations:    Continue venelex as ordered twice daily to heels    PI Prevention:  Turn/reposition approximately every 2 hours  Offload heels with heels hanging off end of pillow at all times while in bed. Sacral Foam dressing: lift to assess regularly; change as needed. Discontinue if incontinence is frequently soiling dressing. Low Air Loss mattress: Use only flat sheet and one incontinence pad. No changes to relay to provider.      Transition of Care: Plan to follow weekly and as needed while admitted to hospital.      ROSY Chavez, RN, [facility]  Certified Wound, Ostomy, Continence Nurse  office [phone]  Available via [facility]

## 2023-01-30 NOTE — PROGRESS NOTES
Physician Progress Note      Waqas Damon  CSN #:                  [id number]  :                       1950  ADMIT DATE:       2023 4:17 PM  [address] [place] DATE:  RESPONDING  PROVIDER #:        Del Sy MD          QUERY TEXT:    Patient admitted with CHF, noted to have diabetes requiring sliding scale insulin. If possible, please document in progress notes and discharge summary if you are evaluating and/or treating any of the following: The medical record reflects the following:  Risk Factors:DM  Clinical Indicators:  23 10:54  GLUCOSE,FAST - POC: 311 (H)    23 11:33  GLUCOSE,FAST - POC: 329 (H)    23 21:21  GLUCOSE,FAST - POC: 265 (H)    23 15:27  Glucose: 380 (H)    23 00:48  Glucose: 280 (H)    23 03:53  Glucose: 261 (H)    Treatment: POC glucose monitoring with lispro sliding scale    Thank you,  Lizzie Valera RN, CDI, CRCR, CCDS  Certified Clinical Documentation   [phone]  You can also contact me via [address] [place] [address]. Options provided:  -- Hyperglycemia due to DM type II  -- Hyperglycemia not related to DM type II  -- Other - I will add my own diagnosis  -- Disagree - Not applicable / Not valid  -- Disagree - Clinically unable to determine / Unknown  -- Refer to Clinical Documentation Reviewer    PROVIDER RESPONSE TEXT:    This patient has Hyperglycemia due to DM type II. Query created by: Barrington Khan on 2023 7:47 AM      QUERY TEXT:    Pt admitted with CHF . Pt noted to have a history of CKD stage 3. If possible, please document in the progress notes and discharge summary if you are evaluating and/or treating any of the following:     The medical record reflects the following:  Risk Factors: abnormal renal labs  Clinical Indicators:  23 05:26  BUN: 17  Creatinine: 1.48 (H)  eGFR: 50 (L)      23 03:53  BUN: 27 (H)  Creatinine: 1.88 (H)  eGFR: 38 (L)    Treatment: lab monitoring    Thank you,  Rissa Herrera RN, CDI, [place], [address] [place] [address]  Certified Clinical Documentation   [phone]  You can also contact me via [address] [place] [address]. Defined by Kidney Disease Improving Global Outcomes (KDIGO) clinical practice guideline for acute kidney injury:  -Increase in SCr by greater than or equal to 0.3 mg/dl within 48?hours; or  -Increase or decrease in SCr to greater than or equal to 1.5 times baseline, which is known or presumed to have occurred within the prior 7 days; or  -Urine volume < 0.5ml/kg/h for 6 hours  Options provided:  -- Acute kidney failure on CKD 3  -- Acute kidney injury on CKD 3  -- CKD stage 3 only  -- Other - I will add my own diagnosis  -- Disagree - Not applicable / Not valid  -- Disagree - Clinically unable to determine / Unknown  -- Refer to Clinical Documentation Reviewer    PROVIDER RESPONSE TEXT:    This patient has an Acute kidney injury on CKD 3. Query created by: Dickson Dyer on 2023 7:51 AM      QUERY TEXT:    Pt admitted with CHF. Pt noted to have pneumonia requiring antibiotics. If possible, please document in the progress notes and discharge summary if you are evaluating and/or treating any of the following:    Note: CAP and HCAP indicate where the pneumonia was acquired, not a specific type. The medical record reflects the followin/28 PN  Risk Factors: abnormal CT  Clinical Indicators:  Fever  CAP  100.7   - CT chest  shows diffuse focal opacities concerning for pneumonia  obtain blood cultures, UA  - COVID-negative test for flu    Treatment: Rocephin IV, vibramycin IV    Thank you,  Rissa Herrera RN, CDI, CRCR, CCDS  Certified Clinical Documentation   [phone]  You can also contact me via [address] [place] [address].   Options provided:  -- Gram negative pneumonia  -- Gram positive pneumonia  -- MRSA pneumonia  -- MSSA pneumonia  -- Pneumonia ruled out  -- Other - I will add my own diagnosis  -- Disagree - Not applicable / Not valid  -- Disagree - Clinically unable to determine / Unknown  -- Refer to Clinical Documentation Reviewer    PROVIDER RESPONSE TEXT:    Provider is clinically unable to determine a response to this query.     Query created by: Melani Devine on 1/30/2023 7:54 AM      Electronically signed by:  Jaimie Hanks MD 1/30/2023 5:14 PM

## 2023-01-30 NOTE — CONSULTS
Oncology consult for new diagnosis for well differentiated neuroendocrine tumor. Patient is currently being evaluated for LVAD. Full note to follow.

## 2023-01-30 NOTE — PROGRESS NOTES
ADVANCED HEART FAILURE NOTE    Patient seen and examined. Feels well. Patient has not walked yet.     Afebrile, SBP 90-110s/30-40s, HR 90-100s ST, I/O net negative 1.7 liters  Cr 1.88    Plan:  Continue milrinone 0.2 mcg/kg/min  Hold diuretics and potassium  Awaiting GI consultation re: neuroendocrine tumor  Complete remainder of LVAD evaluation  Antibiotics for pneumonia per primary team    Gal Mario MD  F Cardiology

## 2023-01-30 NOTE — PROGRESS NOTES
[address] Mauro Murphy  W: [phone]  F: [phone]    Reason for Evaluation:   Anival Trimble is a 71 y.o. male with CAD s/p CABG, PVD, DM, HTN, and new HFrEF (EF 25%) 2/2 ICM and NICM who is seen in consultation at the request of Dr. Thaddeus De La Garza for evaluation of well-differentiated neuroendocrine tumor. Patient is being considered for LVAD placement. Hematology/Oncology Treatment History:   1/18/23 EGD: Path of stomach biopsy with well-differentiated neuroendocrine tumor, G1; chronic active gastritis with intestinal metaplasia; no dysplasia. Stomach polyp with well-differentiated neuroendocrine tumor, G1; chronic active gastritis with intestinal metaplasia, and reactive hyperplasia; no dysplasia. Ki67 low (2%). History of Present Illness:   Anival Trimble is a pleasant 71 y.o. male who presents today for evaluation of well-differentiated neuroendocrine tumor. Patient was initially hospitalized 12/5/22 for cardiogenic shock with newly decreased EF ~25% by echocardiogram.  He has had 3 additional hospitalizations since then for acute heart failure exacerbations. He is being considered for LVAD by advanced heart failure team.  As part of LVAD work-up, he underwent endoscopy/colonoscopy on 1/18/23. EGD was notable for patchy erythema in the gastric body and 6 mm sessile gastric body polyp. Pathology of both areas demonstrated well-differentiated neuroendocrine tumor, G1, Ki67 2%. There was also chronic active gastritis with intestinal metaplasia but no dysplasia. He was re-admitted 1/26/23 for acute heart failure exacerbation. Patient is pending LVAD evaluation and will remain in-house until decision made. Other work-up notable for  - 1/29/23 CT Chest w/o contrast: patchy upper and lower lobe lung infiltrates, suspicious for pneumonia, with superimposed pulmonary edema/effusions.   - 12/22/22 CT A/P w/o contrast: bladder distention/hydronephrosis. No abdominal masses. Review of systems was obtained and pertinent findings reviewed above. Past medical history, social history, family history, medications, and allergies are located in the electronic medical record. Physical Exam:   Visit Vitals  BP (!) 100/39 (BP 1 Location: Left upper arm, BP Patient Position: Semi fowlers; Other (Comment)) Comment (BP Patient Position): after walking   Pulse (!) 103   Temp 98.3 °F (36.8 °C)   Resp 25   Ht 5' 9\" (1.753 m)   Wt 122 lb 3.2 oz (55.4 kg)   SpO2 100%   BMI 18.05 kg/m²     ECOG PS: 3-4  General: no distress, somewhat nauseated, emesis bag at bedside  Eyes: anicteric sclerae  HENT: oropharynx clear  Neck: supple  Respiratory: normal respiratory effort, diminished breath sounds in the bases  CV: no peripheral edema, on inotropes   Ext: warm, well perfused, no edema  GI: soft, nontender, nondistended, no masses  Skin: no rashes, no ecchymoses, no petechiae  Neuro: grossly intact    Results:     Lab Results   Component Value Date/Time    WBC 8.0 01/30/2023 03:53 AM    HGB 8.5 (L) 01/30/2023 03:53 AM    HCT 29.5 (L) 01/30/2023 03:53 AM    PLATELET 118 90/22/9427 03:53 AM    MCV 84.8 01/30/2023 03:53 AM    ABS.  NEUTROPHILS 5.1 01/30/2023 03:53 AM     Lab Results   Component Value Date/Time    Sodium 131 (L) 01/30/2023 03:53 AM    Potassium 4.8 01/30/2023 03:53 AM    Chloride 101 01/30/2023 03:53 AM    CO2 23 01/30/2023 03:53 AM    Glucose 261 (H) 01/30/2023 03:53 AM    BUN 27 (H) 01/30/2023 03:53 AM    Creatinine 1.88 (H) 01/30/2023 03:53 AM    GFR est AA 46 (L) 06/23/2022 09:40 AM    GFR est non-AA 38 (L) 06/23/2022 09:40 AM    Calcium 9.0 01/30/2023 03:53 AM    Sodium,  (L) 12/11/2022 11:07 PM    Potassium, POC 6.2 (HH) 12/11/2022 11:07 PM    Chloride,  (H) 12/11/2022 11:07 PM    Glucose (POC) 293 (H) 01/30/2023 11:26 AM    Creatinine, POC 1.4 (H) 12/11/2022 11:07 PM    Calcium, ionized (POC) 1.22 12/11/2022 11:07 PM     Lab Results   Component Value Date/Time    Bilirubin, total 0.5 01/27/2023 12:52 AM    ALT (SGPT) 23 01/27/2023 12:52 AM    Alk. phosphatase 121 (H) 01/27/2023 12:52 AM    Protein, total 6.6 01/27/2023 12:52 AM    Albumin 3.0 (L) 01/27/2023 12:52 AM    Globulin 3.6 01/27/2023 12:52 AM     Records from [facility] reviewed and summarized above. Test results above have been reviewed. Assessment:     #) Gastric well-differentiated neuroendocrine tumor, G1:   Incidentally noted on EGD as part of pre-LVAD workup. EGD with area of patchy erythema in gastric body and 6 mm gastric body polyp. Non-contrast cross sectional imaging does not show any evidence of metastatic disease. Ideally, we would obtain a dotatate PET-CT to evaluate for metastatic spread; however this can only be obtained as outpatient and patient will remain in-house until decision about his LVAD. As an alternative, would recommend multiphase CT of the abdomen and pelvis to evaluate for metastatic spread. Suspect this either represents Type I gNET or Type III gNET, and gastrin level may help distinguish this. Type 1 gastric NET are associated with elevated gastrin levels and carry an excellent prognosis with 5-year overall survival ~95%. These can frequently be treated with endoscopic resection alone. Type III gNET tend to have a worse prognosis and have higher potential to metastasize, but still carry a good five year overall survival ~75-80%. Our patient has low Ki67, grade 1, and well differentiated, which are all good prognostic features, and suggest a very indolent process with good long-term survival.    It is worth noting that asymptomatic metastatic NETs with low tumor burden are frequently treated with observation alone versus somatostatin analogues (e.g. ocretotide). Even in the event that this represented a metastatic NET, the 5-year overall survival for all gastric NET is ~60% and the cancer-specific survival approaches ~75-80%.    Given its well-differentiated pathology with low grade/Ki67, this number is likely even higher (if excluding his other co-morbidities). Thus, based on the currently available information,  I do not believe that this should preclude him from being considered for VAD but would like additional studies to confirm.              Plan:     Obtain chromogranin A, gastrin and B12 levels  When renal function improves, recommend multiphase CT abdomen w/ and w/o contrast and CT pelvis w/ constrast  Case discussed with GI team and advanced heart failure team   At present, this is unlikely to preclude the patient from VAD consideration, but would like the above studies to help fully prognosticate  Patient's wife updated at bedside and son via phone    Signed By: Baron Kaycee MD

## 2023-01-30 NOTE — PROGRESS NOTES
Comprehensive Nutrition Assessment    Type and Reason for Visit: Initial, Positive nutrition screen, Wound    Nutrition Recommendations/Plan:   Continue with Regular diet to promote PO intakes  Will order vanilla Ensure High Protein BID       Malnutrition Assessment:  Malnutrition Status: Moderate malnutrition (01/30/23 1232)    Context:  Acute illness     Findings of the 6 clinical characteristics of malnutrition:   Energy Intake:  75% or less of est energy req for 7 or more days  Weight Loss:  5% over one month     Body Fat Loss:  Mild body fat loss, Buccal region   Muscle Mass Loss:  Mild muscle mass loss, Clavicles (pectoralis & deltoids), Thigh (quadriceps)  Fluid Accumulation:  Mild, Extremities   Strength:  Not performed        Nutrition Assessment:    72 yo male admitted for CHF. PMHx: CAD s/p PCI x 2, HFrEF with EF 25-30%, DM, HTN, hypercholesterolemia, Nstemi, CKD stage III. PU MST received. WOCN following for DTI to right heel. Familiar with pt from previous admission. Spoke with pt at bedside. Pt recently discharged. He reports good PO intakes for a while when he first went home but his intakes have decreased for a few days. Intakes since admission are documented as 26-75% meals. Breakfast tray observed in room, he ate 25% eggs, sausage. Discussed ONS preferences. Pt states he has been drinking Premier Protein shakes at home, is agreeable to receive Ensure High Protein here, prefers vanilla. During previous admission, pt's wife was bringing in food from home for pt. Multiple snacks on bedside table today from home. Weight hx in EMR indicates 9% weight loss over last 2 months which is significant for time frame. This is consistent with wife's reported UBW of pt as 135 lbs on 12/5.      Labs: Na+ 131, , HgbA1c 7.7      Nutritionally Significant Medications:  Amiodarone, Milrinone, Lasix, Humalog, Protonix, KCl      Estimated Daily Nutrient Needs:  Energy Requirements Based On: Kcal/kg  Weight Used for Energy Requirements: Current  Energy (kcal/day): 3225-9202 (30-35 kcals/kg)  Weight Used for Protein Requirements: Current  Protein (g/day): 83 (1.5 gm/kg (20%))  Method Used for Fluid Requirements: 1 ml/kcal  Fluid (ml/day): 1660    Nutrition Related Findings:   Edema: 1+ BLE   Last BM: 01/30/23, Formed    Wounds: Deep tissue injury, Multiple (DTI to right heel, wound to left ankle, WOCN following)      Current Nutrition Therapies:  Diet: Regular  Supplements: none  Meal intake: Patient Vitals for the past 168 hrs:   % Diet Eaten   01/30/23 1105 51 - 75%   01/30/23 0900 51 - 75%   01/29/23 1322 26 - 50%   01/29/23 1000 26 - 50%   01/28/23 1800 26 - 50%   01/28/23 1200 26 - 50%   01/28/23 0800 26 - 50%   01/27/23 1532 51 - 75%     Supplement intake: Patient Vitals for the past 168 hrs:   Supplement intake %   01/27/23 1532 0%     Nutrition Support: none      Anthropometric Measures:  Height: 5' 9\" (175.3 cm)  Ideal Body Weight (IBW): 160 lbs (73 kg)     Current Body Wt:  55.4 kg (122 lb 2.2 oz), 76.3 % IBW.  Standing scale  Current BMI (kg/m2): 18        Weight Adjustment: No adjustment                 BMI Category: Underweight (BMI less than 22) age over 72    Wt Readings from Last 10 Encounters:   01/30/23 55.4 kg (122 lb 3.2 oz)   01/25/23 55.8 kg (123 lb)   01/23/23 54.9 kg (121 lb)   01/21/23 54.4 kg (120 lb)   01/20/23 52.2 kg (115 lb)   01/20/23 52.2 kg (115 lb)   01/19/23 53 kg (116 lb 13.5 oz)   12/19/22 58.5 kg (129 lb)   12/16/22 57.4 kg (126 lb 8.7 oz)   12/05/22 59 kg (130 lb)           Nutrition Diagnosis:   Inadequate oral intake related to inadequate protein-energy intake as evidenced by intake 26-50%  Underweight related to inadequate protein-energy intake as evidenced by BMI (18)    Nutrition Interventions:   Food and/or Nutrient Delivery: Continue current diet, Start oral nutrition supplement  Nutrition Education/Counseling: No recommendations at this time  Coordination of Nutrition Care: Continue to monitor while inpatient       Goals:     Goals: other (specify)  Specify Other Goals: PO intakes > 75% meals + ONS to promote weight gain of 0.5-1 lb over next 5-7 days    Nutrition Monitoring and Evaluation:   Behavioral-Environmental Outcomes: None identified  Food/Nutrient Intake Outcomes: Food and nutrient intake, Supplement intake  Physical Signs/Symptoms Outcomes: Biochemical data, GI status, Weight    Discharge Planning:    Continue current diet, Continue oral nutrition supplement    Anthony San RD  Available via [employer]

## 2023-01-30 NOTE — PROGRESS NOTES
Problem: Mobility Impaired (Adult and Pediatric)  Goal: *Acute Goals and Plan of Care (Insert Text)  Description: FUNCTIONAL STATUS PRIOR TO ADMISSION: Patient was modified independent using a rollator for functional mobility. Pt recently d/c home after previous hospital stay. Able to ambulate community distances. HOME SUPPORT PRIOR TO ADMISSION: The patient lived with spouse but did not require assist.    Physical Therapy Goals  Initiated 1/28/2023  1. Patient will move from supine to sit and sit to supine  in bed with modified independence within 7 day(s). 2.  Patient will transfer from bed to chair and chair to bed with modified independence using the least restrictive device within 7 day(s). 3.  Patient will perform sit to stand with modified independence within 7 day(s). 4.  Patient will ambulate with modified independence for 300 feet with the least restrictive device within 7 day(s). 5.  Patient will ascend/descend 12 stairs with 1 handrail(s) with supervision/set-up within 7 day(s). Outcome: Progressing Towards Goal     PHYSICAL THERAPY TREATMENT  Patient: Moses Benson (76 y.o. male)  Date: 1/30/2023  Diagnosis: CHF (congestive heart failure) ([facility]) [I50.9] <principal problem not specified>      Precautions: Fall  Chart, physical therapy assessment, plan of care and goals were reviewed. ASSESSMENT  Patient continues with skilled PT services and is progressing towards goals. Pt demonstrates overall improvement in activity tolerance today. He ambulated 125 ft with his rollator walker w/ steady gait, VSS on RA though with soft BP. He is generally CGA for transfers and ambulation. Vitals:    01/30/23 1047 01/30/23 1052 01/30/23 1059 01/30/23 1105   BP: (!) 109/54 (!) 102/39 (!) 110/35 (!) 100/39   BP Patient Position: Semi fowlers; At rest Sitting Standing Semi fowlers; Other (Comment)  Comment: after walking   Pulse: 97 98 (!) 101 (!) 103   Resp: 25      SpO2: 99% 100%  100%     Current Level of Function Impacting Discharge (mobility/balance): CGA    Other factors to consider for discharge: open to home health         PLAN :  Patient continues to benefit from skilled intervention to address the above impairments. Continue treatment per established plan of care. to address goals. Recommendation for discharge: (in order for the patient to meet his/her long term goals)  Physical therapy at least 2 days/week in the home     This discharge recommendation:  Has not yet been discussed the attending provider and/or case management    IF patient discharges home will need the following DME: to be determined (TBD) - ?? Shower chair       SUBJECTIVE:       OBJECTIVE DATA SUMMARY:   Critical Behavior:  Neurologic State: Alert  Orientation Level: Oriented X4  Cognition: Appropriate decision making, Follows commands, Appropriate safety awareness  Safety/Judgement: Awareness of environment, Insight into deficits  Functional Mobility Training:  Bed Mobility:  Supine to sit: Contact guard assist, extra time  Sit to Supine: Contact guard assist.  Pt requested to return to bed post session d/t having sat up in the chair today, just returned to bed prior to therapy session. Transfers:  Sit to Stand: Contact guard assistance (pulled up on walker)  Stand to Sit: Contact guard assistance (hands on walker until cued)  Cues to improve technique sitting and standing (hand placement) w/ pt correcting.                         Balance:  Sitting: Intact  Sitting - Static: Good (unsupported)  Sitting - Dynamic: Good (unsupported)  Standing: Impaired  Standing - Static: Constant support;Good  Standing - Dynamic : Constant support;Good  Ambulation/Gait Training:     Assistive Device: Walker, rollator;Gait belt  Ambulation - Level of Assistance: Contact guard assistance;Assist x1;Additional time                       Speed/Bette: Slow  Step Length: Right shortened;Left shortened                 Pain Rating:  None indicated    Activity Tolerance:   Fair and requires rest breaks    After treatment patient left in no apparent distress:   Supine in bed, Call bell within reach, Caregiver / family present, and Side rails x 3    COMMUNICATION/COLLABORATION:   The patients plan of care was discussed with: Occupational therapist.     Kam Andrews, PT   Time Calculation: 25 mins

## 2023-01-31 NOTE — PROGRESS NOTES
[facility].  [address] [place] [address] E Amy Payan, [address]   [phone]                     GI PROGRESS NOTE    Patient Name: Emiliano Valadez      : 1950      MRN: [id number]  Admit Date: 2023  Today's Date: 2023  CC: `neuroendocrine tumor    Subjective:     Mr. Tabitha Torres  is sitting up in the chair with his wife at bedside. He has a decreased appetite, does not want to eat. Denies N/V or abdominal pain. Objective:     Blood pressure (!) 111/52, pulse (!) 103, temperature 98.9 °F (37.2 °C), resp. rate 29, height 5' 9\" (1.753 m), weight 55.9 kg (123 lb 3.8 oz), SpO2 95 %. Physical Exam:  General appearance: cooperative, no distress, appears stated age  Skin: Extremities and face reveal no rashes. HEENT: Sclerae anicteric. Cardiovascular: Regular rate and rhythm. Respiratory: Comfortable breathing with no accessory muscle use   GI: Abdomen distended, soft, and nontender. Normal active bowel sounds. Rectal: Deferred   Musculoskeletal: No pitting edema of the lower legs. Neurological: Patient is alert and oriented. Psychiatric:  No anxiety or agitation.       Data Review:    Recent Results (from the past 24 hour(s))   GLUCOSE, POC    Collection Time: 23  4:24 PM   Result Value Ref Range    Glucose (POC) 323 (H) 65 - 117 mg/dL    Performed by Driss MATTHEWS    GLUCOSE, POC    Collection Time: 23  8:13 PM   Result Value Ref Range    Glucose (POC) 385 (H) 65 - 117 mg/dL    Performed by Arti Head    DIGOXIN    Collection Time: 23  3:23 AM   Result Value Ref Range    Digoxin level 1.0 0.90 - 2.00 NG/ML   MAGNESIUM    Collection Time: 23  3:23 AM   Result Value Ref Range    Magnesium 1.7 1.6 - 2.4 mg/dL   CBC WITH AUTOMATED DIFF    Collection Time: 23  3:23 AM   Result Value Ref Range    WBC 7.6 4.1 - 11.1 K/uL    RBC 3.66 (L) 4.10 - 5.70 M/uL    HGB 9.1 (L) 12.1 - 17.0 g/dL    HCT 30.6 (L) 36.6 - 50.3 %    MCV 83.6 80.0 - 99.0 FL MCH 24.9 (L) 26.0 - 34.0 PG    MCHC 29.7 (L) 30.0 - 36.5 g/dL    RDW 23.0 (H) 11.5 - 14.5 %    PLATELET 669 463 - 705 K/uL    MPV 10.3 8.9 - 12.9 FL    NRBC 0.0 0  WBC    ABSOLUTE NRBC 0.00 0.00 - 0.01 K/uL    NEUTROPHILS 66 32 - 75 %    LYMPHOCYTES 20 12 - 49 %    MONOCYTES 9 5 - 13 %    EOSINOPHILS 3 0 - 7 %    BASOPHILS 1 0 - 1 %    IMMATURE GRANULOCYTES 1 (H) 0.0 - 0.5 %    ABS. NEUTROPHILS 5.0 1.8 - 8.0 K/UL    ABS. LYMPHOCYTES 1.5 0.8 - 3.5 K/UL    ABS. MONOCYTES 0.7 0.0 - 1.0 K/UL    ABS. EOSINOPHILS 0.2 0.0 - 0.4 K/UL    ABS. BASOPHILS 0.1 0.0 - 0.1 K/UL    ABS. IMM. GRANS. 0.1 (H) 0.00 - 0.04 K/UL    DF SMEAR SCANNED      PLATELET COMMENTS CLUMPED PLATELETS      RBC COMMENTS ANISOCYTOSIS  2+        RBC COMMENTS HYPOCHROMIA  1+        RBC COMMENTS POLYCHROMASIA  1+        RBC COMMENTS OVALOCYTES  PRESENT       METABOLIC PANEL, BASIC    Collection Time: 01/31/23  3:23 AM   Result Value Ref Range    Sodium 131 (L) 136 - 145 mmol/L    Potassium 5.4 (H) 3.5 - 5.1 mmol/L    Chloride 102 97 - 108 mmol/L    CO2 22 21 - 32 mmol/L    Anion gap 7 5 - 15 mmol/L    Glucose 221 (H) 65 - 100 mg/dL    BUN 34 (H) 6 - 20 MG/DL    Creatinine 2.09 (H) 0.70 - 1.30 MG/DL    BUN/Creatinine ratio 16 12 - 20      eGFR 33 (L) >60 ml/min/1.73m2    Calcium 9.1 8.5 - 10.1 MG/DL   NT-PRO BNP    Collection Time: 01/31/23  3:23 AM   Result Value Ref Range    NT pro-BNP 5,174 (H) <125 PG/ML   HEPATIC FUNCTION PANEL    Collection Time: 01/31/23  3:23 AM   Result Value Ref Range    Protein, total 6.9 6.4 - 8.2 g/dL    Albumin 3.1 (L) 3.5 - 5.0 g/dL    Globulin 3.8 2.0 - 4.0 g/dL    A-G Ratio 0.8 (L) 1.1 - 2.2      Bilirubin, total 0.5 0.2 - 1.0 MG/DL    Bilirubin, direct 0.3 (H) 0.0 - 0.2 MG/DL    Alk.  phosphatase 102 45 - 117 U/L    AST (SGOT) 14 (L) 15 - 37 U/L    ALT (SGPT) 16 12 - 78 U/L   GLUCOSE, POC    Collection Time: 01/31/23  6:51 AM   Result Value Ref Range    Glucose (POC) 215 (H) 65 - 117 mg/dL    Performed by [place] Noe Bauer    GLUCOSE, POC    Collection Time: 01/31/23 11:44 AM   Result Value Ref Range    Glucose (POC) 309 (H) 65 - 117 mg/dL    Performed by Masoud Cuba / Plan :     Mr. Richard Solorio was initially referred to our service for EGD/colonoscopy as part of his LVAD workup. Incidental finding on EGD pathology that indicated well- differentiated neuroendocrine tumor G1. We have been re-consulted to evaluate new diagnosis. Colonoscopy   Findings:  Rectum: normal  Sigmoid: normal  Descending Colon: normal  Transverse Colon: normal  Ascending Colon: normal  Cecum: normal    EGD   Findings:  Esophagus:normal  Stomach:Patchy erythema gastric body, biopsies done. 6 mm sessile polyp gastric body biopsied  Duodenum/jejunum:normal    EGD Pathology (1/18/23):    1. Stomach, body; biopsy:        Well-differentiated neuroendocrine tumor, G1; (see comment). Chronic active gastritis with intestinal metaplasia; no epithelial   dysplasia noted. No H. pylori-type organisms identified on H&E and IHC stained sections. 2. Stomach, polyp; polypectomy:        Well-differentiated neuroendocrine tumor, G1; (see comment). Chronic active gastritis with intestinal metaplasia, surface erosion   and reactive foveolar hyperplasia; no epithelial dysplasia identified. No H. pylori-type organisms identified on H&E and IHC stained sections.     - Oncology following  - Nephrology consulted for worsening kidney function   - Palliative consulted for goals of care  - Supportive care per hospitalist   - GI following          Patient Active Hospital Problem List:   Active Problems:    CHF (congestive heart failure) ([facility] [address] (12/12/2022)      Time Spent with Patient: [address], NP

## 2023-01-31 NOTE — PROGRESS NOTES
Problem: Diabetes Self-Management  Goal: *Disease process and treatment process  Description: Define diabetes and identify own type of diabetes; list 3 options for treating diabetes. Outcome: Progressing Towards Goal  Goal: *Incorporating nutritional management into lifestyle  Description: Describe effect of type, amount and timing of food on blood glucose; list 3 methods for planning meals. Outcome: Progressing Towards Goal  Goal: *Incorporating physical activity into lifestyle  Description: State effect of exercise on blood glucose levels. Outcome: Progressing Towards Goal  Goal: *Developing strategies to promote health/change behavior  Description: Define the ABC's of diabetes; identify appropriate screenings, schedule and personal plan for screenings. Outcome: Progressing Towards Goal  Goal: *Using medications safely  Description: State effect of diabetes medications on diabetes; name diabetes medication taking, action and side effects. Outcome: Progressing Towards Goal  Goal: *Monitoring blood glucose, interpreting and using results  Description: Identify recommended blood glucose targets  and personal targets. Outcome: Progressing Towards Goal  Goal: *Prevention, detection, treatment of acute complications  Description: List symptoms of hyper- and hypoglycemia; describe how to treat low blood sugar and actions for lowering  high blood glucose level. Outcome: Progressing Towards Goal  Goal: *Prevention, detection and treatment of chronic complications  Description: Define the natural course of diabetes and describe the relationship of blood glucose levels to long term complications of diabetes.   Outcome: Progressing Towards Goal  Goal: *Developing strategies to address psychosocial issues  Description: Describe feelings about living with diabetes; identify support needed and support network  Outcome: Progressing Towards Goal  Goal: *Insulin pump training  Outcome: Progressing Towards Goal  Goal: *Sick day guidelines  Outcome: Progressing Towards Goal  Goal: *Patient Specific Goal (EDIT GOAL, INSERT TEXT)  Outcome: Progressing Towards Goal     Problem: Patient Education: Go to Patient Education Activity  Goal: Patient/Family Education  Outcome: Progressing Towards Goal     Problem: Pressure Injury - Risk of  Goal: *Prevention of pressure injury  Description: Document Mike Scale and appropriate interventions in the flowsheet. Outcome: Progressing Towards Goal  Note: Pressure Injury Interventions:  Sensory Interventions: Assess need for specialty bed, Avoid rigorous massage over bony prominences, Minimize linen layers, Monitor skin under medical devices, Pad between skin to skin, Pressure redistribution bed/mattress (bed type)    Moisture Interventions: Apply protective barrier, creams and emollients, Assess need for specialty bed, Check for incontinence Q2 hours and as needed, Minimize layers, Moisture barrier    Activity Interventions: Pressure redistribution bed/mattress(bed type), Increase time out of bed    Mobility Interventions: HOB 30 degrees or less, Pressure redistribution bed/mattress (bed type)    Nutrition Interventions: Document food/fluid/supplement intake    Friction and Shear Interventions: Feet elevated on foot rest, Foam dressings/transparent film/skin sealants, HOB 30 degrees or less                Problem: Patient Education: Go to Patient Education Activity  Goal: Patient/Family Education  Outcome: Progressing Towards Goal     Problem: Falls - Risk of  Goal: *Absence of Falls  Description: Document Laverne Fall Risk and appropriate interventions in the flowsheet.   Outcome: Progressing Towards Goal  Note: Fall Risk Interventions:  Mobility Interventions: Bed/chair exit alarm, Communicate number of staff needed for ambulation/transfer, PT Consult for mobility concerns, PT Consult for assist device competence         Medication Interventions: Evaluate medications/consider consulting pharmacy, Patient to call before getting OOB, Teach patient to arise slowly    Elimination Interventions: Call light in reach, Elevated toilet seat, Toilet paper/wipes in reach    History of Falls Interventions: Consult care management for discharge planning, Door open when patient unattended, Room close to nurse's station, Utilize gait belt for transfer/ambulation         Problem: Patient Education: Go to Patient Education Activity  Goal: Patient/Family Education  Outcome: Progressing Towards Goal     Problem: Pain  Goal: *Control of Pain  Outcome: Progressing Towards Goal  Goal: *PALLIATIVE CARE:  Alleviation of Pain  Outcome: Progressing Towards Goal     Problem: Patient Education: Go to Patient Education Activity  Goal: Patient/Family Education  Outcome: Progressing Towards Goal     Problem: Patient Education: Go to Patient Education Activity  Goal: Patient/Family Education  Outcome: Progressing Towards Goal     Problem: Patient Education: Go to Patient Education Activity  Goal: Patient/Family Education  Outcome: Progressing Towards Goal

## 2023-01-31 NOTE — PROGRESS NOTES
Problem: Mobility Impaired (Adult and Pediatric)  Goal: *Acute Goals and Plan of Care (Insert Text)  Description: FUNCTIONAL STATUS PRIOR TO ADMISSION: Patient was modified independent using a rollator for functional mobility. Pt recently d/c home after previous hospital stay. Able to ambulate community distances. HOME SUPPORT PRIOR TO ADMISSION: The patient lived with spouse but did not require assist.    Physical Therapy Goals  Initiated 1/28/2023  1. Patient will move from supine to sit and sit to supine  in bed with modified independence within 7 day(s). 2.  Patient will transfer from bed to chair and chair to bed with modified independence using the least restrictive device within 7 day(s). 3.  Patient will perform sit to stand with modified independence within 7 day(s). 4.  Patient will ambulate with modified independence for 300 feet with the least restrictive device within 7 day(s). 5.  Patient will ascend/descend 12 stairs with 1 handrail(s) with supervision/set-up within 7 day(s). Outcome: Progressing Towards Goal   PHYSICAL THERAPY TREATMENT  Patient: Alivia Castaneda (76 y.o. male)  Date: 1/31/2023  Diagnosis: CHF (congestive heart failure) ([facility]) [I50.9] <principal problem not specified>      Precautions: Fall  Chart, physical therapy assessment, plan of care and goals were reviewed. ASSESSMENT  Patient continues with skilled PT services and is progressing towards goals. Patient found supine in bed and agreeable to PT. Patient overall CGA-SBA for all mobility, ambulating 145 feet with rollator with decreased speed but no LOB. Patient cued for activity pacing and energy conservation with activity and provided education on activity tolerance expectations post LVAD if patient proceeds with procedure per workup. Spo2 94-98% on 3LPM O2 via NC and -105 with activity.  Patient returned to room and provided with further education on post-op expectations including move in the tube sternal precautions, cardiac UE exercises, and equipment. Patient left seated in chair with call bell in reach and supportive family present. Current Level of Function Impacting Discharge (mobility/balance): ambulates 145 feet with CGA-SBA, rollator, and 3LPM     Other factors to consider for discharge: progressing activity tolerance, GI workup          PLAN :  Patient continues to benefit from skilled intervention to address the above impairments. Continue treatment per established plan of care. to address goals. Recommendation for discharge: (in order for the patient to meet his/her long term goals)  Physical therapy at least 2 days/week in the home  vs. TBD pending possible LVAD     This discharge recommendation:  A follow-up discussion with the attending provider and/or case management is planned    IF patient discharges home will need the following DME: patient owns DME required for discharge       SUBJECTIVE:   Patient stated I want to get stronger.     OBJECTIVE DATA SUMMARY:   Critical Behavior:  Neurologic State: Alert  Orientation Level: Oriented X4  Cognition: Appropriate decision making, Appropriate for age attention/concentration, Follows commands, Recognition of people/places  Safety/Judgement: Awareness of environment, Insight into deficits  Functional Mobility Training:  Bed Mobility:  Rolling: Stand-by assistance  Supine to Sit: Stand-by assistance     Scooting: Stand-by assistance        Transfers:  Sit to Stand: Stand-by assistance  Stand to Sit: Stand-by assistance        Bed to Chair: Stand-by assistance                    Balance:  Sitting: Intact; Without support  Sitting - Static: Good (unsupported)  Sitting - Dynamic: Good (unsupported)  Standing: Impaired; With support  Standing - Static: Good;Constant support  Standing - Dynamic : Good;Fair;Constant support  Ambulation/Gait Training:  Distance (ft): 145 Feet (ft)  Assistive Device: Walker, rollator;Gait belt  Ambulation - Level of Assistance: Contact guard assistance;Stand-by assistance        Gait Abnormalities: Decreased step clearance              Speed/Bette: Pace decreased (<100 feet/min)  Step Length: Right shortened;Left shortened                    Pain Ratin/10    Activity Tolerance:   Good, desaturates with exertion and requires oxygen, and requires rest breaks    After treatment patient left in no apparent distress:   Sitting in chair, Call bell within reach, and Caregiver / family present    COMMUNICATION/COLLABORATION:   The patients plan of care was discussed with: Registered nurse.      Heriberto Segovia, PT   Time Calculation: 30 mins

## 2023-01-31 NOTE — PROGRESS NOTES
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 During LVAD eval, found to have well-differentiated neuroendocrine tumor on gastric body and gastric polyp, G1; with GI, Oncology and Palliative following  No absolute contraindications to LVAD at this point, as we await further diagnostics for Oncology to better prognosticate- Preferable to have CT w/contrast, but cannot proceed with contrast imaging until renal function improved  Will continue to move forward and complete remainder of LVAD workup while inpatient and discuss at [facility]. Discussed yesterday with Oncology, today with Hospitalist, Patient and Patient's wife and son (by phone).           RECOMMENDATIONS:  Continue milrinone  0.2mcg/kg/min unable to uptitrate due to HR and BP  Continue toprol XL 12.5mg BID  Cannot tolerate ARB/ARNi due to hypotension  Of note Flomax was held and BP responded well   Cannot tolerate spironolactone due to hyperkalemia  Cannot tolerate jardiance 10mg daily due to significant diuresis on smallest dose/contributed to IVVD  Discontinued corlanor due to SVT  Digoxin stopped d/t risk for toxicity with amio loading  Continue amiodarone, appreciate EP cardiology recs  Hold lasix and potassium; montior renal function  Appreciate nephrology consult  Keep K+ >4 and Mg >2  Abx/treatment of suspected PNA per hospitalist  Continue lifevest  Continue baby aspirin   Plavix previously stopped, okayed by Dr. Crow January consult pending (high risk for TRISTAN)  VAD evaluation in progress, will need PFT/DLCO, maxillary CT with contrast, urology consult, sleep medicine consult  Pending further diagnostics for Oncology prognosis  Consider re-ordering CT head/maxillofacial tomorrow to continue LVAD eval  CAV cardiology consulted; appreciate their input     All other care per primary team, hopefully home end of the week      INTERVAL HISTORY:  NAEO  Urine cx neg  Blood cx NGTD  Cr rising  Net -1.2L last 24 hrs  Pro BNP stable       IMPRESSION:  Acute on chronic combined systolic/diastolic  heart failure  Stage D, NYHA class IIIB/IV symptoms   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, LVEF 25-30% and 23% (by cMRI 1/3/23)  Cardiac MRI suggestive of ischemic cardiomyopathy  PYP equivocal  Chronic milrinone infusion as palliation   Coronary artery disease  CAD s/p CABG x 2: further disease best managed medically due to small vessel size   At risk of sudden cardiac death  Peripheral arterial disease  Bilateral hydronephrosis s/p donnelly  Cardiac risk factors:  HTN  HL  TRISTAN, STOP-BANG 4  DM2  CKD, stage 3  MOCA from 1/4/22 17/30, consistent with mild cognitive impairment  Hard of hearing  Gastric Neuroendocrine Tumor  Sepsis, unclear source         LIFE GOALS:  Lifestyle goals reviewed with the patient.   Patient's personal goals include: TBD  Important upcoming milestones or family events: TBD  The patient identifies the following as important for living well: TBD                CARDIAC IMAGING:  Echo 1/9/23   Left Ventricle: Severely reduced left ventricular systolic function with a visually estimated EF of 25 - 30%. Left ventricle size is normal. Mildly increased wall thickness. Echo 12/26/22    Left Ventricle: Severely reduced left ventricular systolic function with a visually estimated EF of 25 - 30%. Left ventricle size is normal. Normal wall thickness. There are regional wall motion abnormalities. Grade II diastolic dysfunction with increased LAP. Right Ventricle: Moderately reduced systolic function. TAPSE is abnormal. TAPSE is 1.1 cm. Aortic Valve: Mild stenosis of the aortic valve. AV peak gradient is 13 mmHg. AV peak velocity is 1.8 m/s. Mitral Valve: Not well visualized. Moderate annular calcification at the posterior leaflet of the mitral valve. Mild to moderate regurgitation. Tricuspid Valve: Mildly elevated RVSP. Left Atrium: Left atrium is moderately dilated. 12/8/22    Left Ventricle: Moderately reduced left ventricular systolic function with a visually estimated EF of 35 - 40%. Severe hypokinesis of the following segments: mid anteroseptal, apical anterior, apical septal, apical inferior and apical lateral. Severe hypokinesis of the apex. Mitral Valve: Severely thickened leaflet, at the anterior and posterior leaflets. Severely calcified leaflet, at the anterior and posterior leaflets. Mild annular calcification of the mitral valve. Moderate regurgitation. Left Atrium: Left atrium is mildly dilated. Contrast used: Definity. limited study     EKG 12/22/22 ST, Biatria enlargement, marked ST abnormality     C 12/6/22  1. Normal LVEDP  2. Severe native multivessel coronary artery disease  3. Patent LIMA to LAD and vein graft to distal RCA  4. Recurrent ISR in OM1 stent with now 60 to 70% restenosis  5. Recoil of left main and circumflex stent with now recurrent 40 to 50% stenosis. 6.  Progression of ostial left main disease now to about 60% stenosis  7.   Progression of disease in jailed first marginal branch now with diffuse 90% stenosis  8. High-grade stenosis in the mid to distal right potential femoral artery treated with 6 x 40 mm impact drug-coated balloon angioplasty to reduce the stenosis to less than 40%     NST        HEMODYNAMICS:  RHC 1/9/23  PA 20/9, RA 3, PCWP 8, CI 1.8     CPEST too ill   6MW 300 feet       HPI:  71 y.o. male who was admitted via ED for worsening SOB, poor appetite, orthopnea and fatigue. Pmhx of CAD s/p CABG, PAD, DM 2, recent admission for HFmrEF earlier this month. Serial TTEs show progressive decline in EF since 3/2021 with the most recent EF at 25-30%. Recent LHC shows diffuse CAD and no interventions indicated. Patient had [employer] recently and there is some thought to myocarditis or other etiology causing worsening HF. [facility] was consulted for further evaluation and management of HFrEF. REVIEW OF SYSTEMS:  Review of Systems   Constitutional:  Positive for malaise/fatigue. Negative for chills and fever. Respiratory:  Positive for shortness of breath. Negative for cough. Cardiovascular:  Negative for chest pain, palpitations, orthopnea and leg swelling. Gastrointestinal:  Negative for abdominal pain, heartburn, nausea and vomiting. Genitourinary:         Poor appetite   Neurological:  Positive for weakness. Negative for dizziness. PHYSICAL EXAM:  Visit Vitals  BP (!) 110/58   Pulse 98   Temp 98.1 °F (36.7 °C)   Resp 25   Ht 5' 9\" (1.753 m)   Wt 123 lb 3.8 oz (55.9 kg)   SpO2 99%   BMI 18.20 kg/m²     Physical Exam  Vitals and nursing note reviewed. Constitutional:       General: He is not in acute distress. Appearance: Normal appearance. He is ill-appearing. Cardiovascular:      Rate and Rhythm: Normal rate and regular rhythm. Heart sounds: Normal heart sounds. No murmur heard. No friction rub. No gallop. Pulmonary:      Effort: Pulmonary effort is normal. No respiratory distress.       Breath sounds: Normal breath sounds. Abdominal:      General: Bowel sounds are normal. There is no distension. Palpations: Abdomen is soft. Tenderness: There is no abdominal tenderness. Musculoskeletal:      Right lower le+ Pitting Edema present. Left lower le+ Pitting Edema present. Skin:     General: Skin is warm and dry. Capillary Refill: Capillary refill takes less than 2 seconds. Neurological:      General: No focal deficit present. Mental Status: He is alert and oriented to person, place, and time. Psychiatric:         Mood and Affect: Mood normal.         Behavior: Behavior normal.         Thought Content:  Thought content normal.         Judgment: Judgment normal.            PAST MEDICAL HISTORY:  Past Medical History:   Diagnosis Date    CAD (coronary artery disease) 11/10/2016    NSTEMI & 2 stents    Deafness 10/28/2012    DM (diabetes mellitus) ([facility] [address]     Elevated cholesterol     Hypertension     NSTEMI (non-ST elevated myocardial infarction) ([facility] [address] 11/10/2016       PAST SURGICAL HISTORY:  Past Surgical History:   Procedure Laterality Date    COLONOSCOPY N/A 2018    COLONOSCOPY performed by Laura Ibanez MD at [facility] ENDOSCOPY    COLONOSCOPY N/A 2023    COLONOSCOPY performed by Dena Davis MD at [facility] ENDOSCOPY    [address]  2016    2 stents       FAMILY HISTORY:  Family History   Problem Relation Age of Onset    Heart Disease Father     Heart Attack Father     Hypertension Mother     Elevated Lipids Brother     Elevated Lipids Brother     No Known Problems Sister     Elevated Lipids Brother     No Known Problems Son     No Known Problems Daughter     Anesth Problems Neg Hx        SOCIAL HISTORY:  Social History     Socioeconomic History    Marital status:    Tobacco Use    Smoking status: Never     Passive exposure: Never    Smokeless tobacco: Never   Vaping Use    Vaping Use: Never used   Substance and Sexual Activity    Alcohol use: Yes     Alcohol/week: 2.0 standard drinks     Types: 1 Cans of beer, 1 Shots of liquor per week     Comment: rarely    Drug use: No    Sexual activity: Yes     Social Determinants of Health     Financial Resource Strain: Medium Risk    Difficulty of Paying Living Expenses: Somewhat hard   Food Insecurity: Food Insecurity Present    Worried About Running Out of Food in the Last Year: Never true    Ran Out of Food in the Last Year: Often true       LABORATORY RESULTS:     Labs Latest Ref Rng & Units 1/31/2023 1/30/2023 1/29/2023 1/28/2023 1/28/2023 1/27/2023 1/26/2023   WBC 4.1 - 11.1 K/uL 7.6 8.0 8.8 - 6.3 6.7 5.3   RBC 4.10 - 5.70 M/uL 3.66(L) 3.48(L) 3.52(L) - 3.24(L) 3.59(L) 3.64(L)   Hemoglobin 12.1 - 17.0 g/dL 9.1(L) 8.5(L) 8.7(L) - 8. 0(L) 9.2(L) 8. 9(L)   Hematocrit 36.6 - 50.3 % 30. 6(L) 29. 5(L) 29. 3(L) - 26. 5(L) 29. 9(L) 30. 9(L)   MCV 80.0 - 99.0 FL 83.6 84.8 83.2 - 81.8 83.3 84.9   Platelets 900 - 442 K/uL 307 297 270 - 217 229 238   Lymphocytes 12 - 49 % 20 23 33 - 27 - 32   Monocytes 5 - 13 % 9 10 10 - 11 - 11   Eosinophils 0 - 7 % 3 1 3 - 3 - 2   Basophils 0 - 1 % 1 1 1 - 1 - 1   Albumin 3.5 - 5.0 g/dL - - - - - 3. 0(L) 3. 2(L)   Calcium 8.5 - 10.1 MG/DL 9.1 9.0 8.8 8.4(L) 8.7 8.9 9.3   Glucose 65 - 100 mg/dL 221(H) 261(H) 280(H) 380(H) 289(H) 237(H) 198(H)   BUN 6 - 20 MG/DL 34(H) 27(H) 24(H) 23(H) 17 16 19   Creatinine 0.70 - 1.30 MG/DL 2.09(H) 1.88(H) 1.78(H) 1.58(H) 1.48(H) 1.15 1.36(H)   Sodium 136 - 145 mmol/L 131(L) 131(L) 132(L) 132(L) 132(L) 137 136   Potassium 3.5 - 5.1 mmol/L 5.4(H) 4.8 4.4 4.3 3.3(L) 3.9 4.6   TSH 0.36 - 3.74 uIU/mL - - - - 3.52 - -   PSA 0.01 - 4.0 ng/mL - - - - - - -   LDH 85 - 241 U/L - - - - - - -   Some recent data might be hidden     Lab Results   Component Value Date/Time    TSH 3.52 01/28/2023 05:26 AM    TSH 2.12 12/27/2022 02:36 PM    TSH 4.80 (H) 12/06/2022 03:53 AM    TSH 5.39 (H) 10/12/2022 09:10 AM    TSH 3.53 02/03/2022 11:47 AM    TSH 5.790 (H) 11/21/2019 04:45 PM    TSH 3.08 06/22/2018 01:53 PM    TSH 4.250 05/26/2015 09:43 AM       ALLERGY:  No Known Allergies     CURRENT MEDICATIONS:    Current Facility-Administered Medications:     sodium zirconium cyclosilicate (LOKELMA) powder packet 5 g, 5 g, Oral, NOW, Jadiel Galvan MD    insulin glargine (LANTUS) injection 10 Units, 10 Units, SubCUTAneous, DAILY, Job Munoz MD, 10 Units at 01/31/23 0846    apixaban (ELIQUIS) tablet 10 mg, 10 mg, Oral, BID, 10 mg at 01/31/23 0846 **FOLLOWED BY** [START ON 2/6/2023] apixaban (ELIQUIS) tablet 5 mg, 5 mg, Oral, BID, Job Munoz MD    [Held by provider] potassium chloride SR (KLOR-CON 10) tablet 20 mEq, 20 mEq, Oral, BID, Gay STALEY NP, 20 mEq at 01/30/23 1030    cefTRIAXone (ROCEPHIN) 1 g in 0.9% sodium chloride 10 mL IV syringe, 1 g, IntraVENous, Q24H, Kathy Brown MD, 1 g at 01/30/23 1151    doxycycline (VIBRAMYCIN) 100 mg in 0.9% sodium chloride (MBP/ADV) 100 mL MBP, 100 mg, IntraVENous, Q12H, Kathy Brown MD, Last Rate: 100 mL/hr at 01/31/23 0138, 100 mg at 01/31/23 0138    glucose chewable tablet 16 g, 4 Tablet, Oral, PRN, Ita Harvey NP    glucagon (GLUCAGEN) injection 1 mg, 1 mg, IntraMUSCular, PRN, Ita Harvey NP    dextrose 10 % infusion 0-250 mL, 0-250 mL, IntraVENous, PRN, Ita Harvey NP    insulin lispro (HUMALOG) injection, , SubCUTAneous, AC&HS, Ita Harvey NP, 3 Units at 01/31/23 9953    balsam peru-castor oiL (VENELEX) ointment, , Topical, BID, Johnny Smith NP, Given at 01/31/23 5841    metoprolol succinate (TOPROL-XL) XL tablet 12.5 mg, 12.5 mg, Oral, Q12H, Deisy Stein MD, 12.5 mg at 01/31/23 0846    lidocaine 4 % patch 1 Patch, 1 Patch, TransDERmal, Q24H, Job Munoz MD, 1 Patch at 01/30/23 1151    amiodarone (CORDARONE) tablet 400 mg, 400 mg, Oral, BID, Armida Lynch MD, 400 mg at 01/31/23 0846    aspirin delayed-release tablet 81 mg, 81 mg, Oral, DAILY, Heber Barajas MD LUÍS, 81 mg at 01/31/23 0846    sodium chloride (NS) flush 5-40 mL, 5-40 mL, IntraVENous, Q8H, Heber Barajas MD, 10 mL at 01/31/23 0700    sodium chloride (NS) flush 5-40 mL, 5-40 mL, IntraVENous, PRN, Heber Barajas MD    acetaminophen (TYLENOL) tablet 650 mg, 650 mg, Oral, Q6H PRN, 650 mg at 01/28/23 0003 **OR** acetaminophen (TYLENOL) suppository 650 mg, 650 mg, Rectal, Q6H PRN, Heber Barajas MD    polyethylene glycol (MIRALAX) packet 17 g, 17 g, Oral, DAILY PRN, Heber Barajas MD    ondansetron (ZOFRAN ODT) tablet 4 mg, 4 mg, Oral, Q8H PRN, 4 mg at 01/30/23 1357 **OR** ondansetron (ZOFRAN) injection 4 mg, 4 mg, IntraVENous, Q6H PRN, Heber Barajas MD    [Held by provider] furosemide (LASIX) injection 40 mg, 40 mg, IntraVENous, BID, Heber Barajas MD, 40 mg at 01/30/23 0942    heparin (porcine) pf 500 Units, 500 Units, InterCATHeter, PRN, Heber Barajas MD    pantoprazole (PROTONIX) tablet 40 mg, 40 mg, Oral, ACB, Heber Barajas MD, 40 mg at 01/31/23 0657    rosuvastatin (CRESTOR) tablet 20 mg, 20 mg, Oral, QHS, Heber Barajas MD, 20 mg at 01/30/23 2018    zolpidem (AMBIEN) tablet 5 mg, 5 mg, Oral, QHS PRN, Heber Barajas MD, 5 mg at 01/30/23 2020    milrinone (PRIMACOR) 20 MG/100 ML D5W infusion, 0.2 mcg/kg/min, IntraVENous, CONTINUOUS, Heber Barajas MD, Last Rate: 3.3 mL/hr at 01/30/23 1030, 0.2 mcg/kg/min at 01/30/23 1030    heparin (porcine) pf 500 Units, 500 Units, InterCATHeter, DAILY, Heber Barajas MD, 500 Units at 01/31/23 0847    PATIENT CARE TEAM:  Patient Care Team:  Mariza Connolly MD as PCP - General (Family Medicine)  Mariza Connolly MD as PCP - [address] [place] [address] Provider  Ariadna Rene MD (Cardiovascular Disease Physician)  Marbella Madrigal MD (Gastroenterology)  Jess Odell MD (Cardiothoracic Surgery)  Amanda Sutherland MD (Cardiovascular Disease Physician)  Misti Mcclain MD (Nephrology)  Latoya Dickerson RN as Care Transitions Nurse  Tala Rodgers MD (Pulmonary Disease)  Jack Del Toro MD ([address] [place] [address] Vascular Surgery)     Thank you for allowing me to participate in this patient's care.     Helio Monzon NP   [address] [place] [address], [address]  Phone: [phone]

## 2023-01-31 NOTE — CONSULTS
Assessment:  TANNER on CKD-3a: Cr up to 2mg/dl today. Suspect cardiorenal effects with needed diuresis. Hyperkalemia: K bump to 5.4-> 2 to oral KCL/evolving TANNER    Ischemic CM: Recurrent exacerbations. EF 20%. On continuous Milrinone since last admission. Interested in LVAD    BPH: s/p donnelly    Well differentiated NET Grade 1: noted on EGD 1/18/23    Anemia    Hyponatremia: mild-> Corrects to 133 (for hyperglycemia). Underlying CHF likely culprit    Left UE basilic and radial vein thrombus    Plan/Recommendations:  Holding oral KCl  Holding IV Lasix  Lokelma 5g x1 dose today  Low K diet for now  IV Abx  Cx neg to date  Appreciate oncology involvement-> ongoing work up ordered  Palliative care involvement seems most appropriate-> Patient/family expressing interest in possible transition to hospice. Strict I/Os    Discussed with patient/family and RN    Thanks for the consultation. Renal service will follow patient with you. Please contact me with any questions or concerns. Initial Consult note         Patient name: Cale Payne  MR no: [id number]  Date of admission: 1/26/2023  Date of consultation: 1/31/2023  Requested by: Dr. Елена Buck  Reason for consult: TANNER on CKD    Patient seen and examined. History obtained from patient and chart review. Relevant labs, data and notes reviewed. HPI: Cale Payne is a 71 y.o. male with PMH significant for CKD stage 3a,HTN, DM2, BPH and Ischemic CM (EF 20%) on continuous Milrinone drip with recurrent admissions over the last several months with decompensated CHF. Presented back on 1/26/23 with worsening SOB/DONALDSON. Initial workup included Chest CT c/w pulm edema and possible PNA. Started on IV Abx. Hospital course complicated by rising serum Cr from admission level of 1.3 to current level of 2mg/dl. Nephrology consulted to evaluate and manage TANNER on CKD.  Patient has seen my partner Dr. Debbie Nieto in the past for CKD management and was seen by our service during his most recent hospitalization. Last hospitalization patient decided he would like to consider possible LVAD. EGD on 1/18 noted well differentiated NET grade 1. Oncology doing further workup to help sort of candidacy for LVAD. PMH:  Past Medical History:   Diagnosis Date    CAD (coronary artery disease) 11/10/2016    NSTEMI & 2 stents    Deafness 10/28/2012    DM (diabetes mellitus) ([facility] [address]     Elevated cholesterol     Hypertension     NSTEMI (non-ST elevated myocardial infarction) ([facility] [address] 11/10/2016     PSH:  Past Surgical History:   Procedure Laterality Date    COLONOSCOPY N/A 6/28/2018    COLONOSCOPY performed by Piotr Davalos MD at [facility] N/A 1/18/2023    COLONOSCOPY performed by Jose Selby MD at [facility] ENDOSCOPY    [address]  11/11/2016    2 stents       Social history:   Social History     Tobacco Use    Smoking status: Never     Passive exposure: Never    Smokeless tobacco: Never   Vaping Use    Vaping Use: Never used   Substance Use Topics    Alcohol use: Yes     Alcohol/week: 2.0 standard drinks     Types: 1 Cans of beer, 1 Shots of liquor per week     Comment: rarely    Drug use: No       Family history:  No history of CKD or ESRD in the family.      No Known Allergies    Current Facility-Administered Medications   Medication Dose Route Frequency Last Admin    insulin glargine (LANTUS) injection 10 Units  10 Units SubCUTAneous DAILY 10 Units at 01/31/23 0846    apixaban (ELIQUIS) tablet 10 mg  10 mg Oral BID 10 mg at 01/31/23 0846    Followed by    Akiko Ellis ON 2/6/2023] apixaban (ELIQUIS) tablet 5 mg  5 mg Oral BID      [Held by provider] potassium chloride SR (KLOR-CON 10) tablet 20 mEq  20 mEq Oral BID 20 mEq at 01/30/23 1030    cefTRIAXone (ROCEPHIN) 1 g in 0.9% sodium chloride 10 mL IV syringe  1 g IntraVENous Q24H 1 g at 01/30/23 1151    doxycycline (VIBRAMYCIN) 100 mg in 0.9% sodium chloride (MBP/ADV) 100 mL MBP  100 mg IntraVENous Q12H 100 mg at 01/31/23 0138    glucose chewable tablet 16 g  4 Tablet Oral PRN      glucagon (GLUCAGEN) injection 1 mg  1 mg IntraMUSCular PRN      dextrose 10 % infusion 0-250 mL  0-250 mL IntraVENous PRN      insulin lispro (HUMALOG) injection   SubCUTAneous AC&HS 3 Units at 01/31/23 0657    balsam peru-castor oiL (VENELEX) ointment   Topical BID Given at 01/31/23 0852    metoprolol succinate (TOPROL-XL) XL tablet 12.5 mg  12.5 mg Oral Q12H 12.5 mg at 01/31/23 0846    lidocaine 4 % patch 1 Patch  1 Patch TransDERmal Q24H 1 Patch at 01/30/23 1151    amiodarone (CORDARONE) tablet 400 mg  400 mg Oral  mg at 01/31/23 0846    aspirin delayed-release tablet 81 mg  81 mg Oral DAILY 81 mg at 01/31/23 0846    sodium chloride (NS) flush 5-40 mL  5-40 mL IntraVENous Q8H 10 mL at 01/31/23 0700    sodium chloride (NS) flush 5-40 mL  5-40 mL IntraVENous PRN      acetaminophen (TYLENOL) tablet 650 mg  650 mg Oral Q6H  mg at 01/28/23 0003    Or    acetaminophen (TYLENOL) suppository 650 mg  650 mg Rectal Q6H PRN      polyethylene glycol (MIRALAX) packet 17 g  17 g Oral DAILY PRN      ondansetron (ZOFRAN ODT) tablet 4 mg  4 mg Oral Q8H PRN 4 mg at 01/30/23 1357    Or    ondansetron (ZOFRAN) injection 4 mg  4 mg IntraVENous Q6H PRN      [Held by provider] furosemide (LASIX) injection 40 mg  40 mg IntraVENous BID 40 mg at 01/30/23 0942    heparin (porcine) pf 500 Units  500 Units InterCATHeter PRN      pantoprazole (PROTONIX) tablet 40 mg  40 mg Oral ACB 40 mg at 01/31/23 0657    rosuvastatin (CRESTOR) tablet 20 mg  20 mg Oral QHS 20 mg at 01/30/23 2018    zolpidem (AMBIEN) tablet 5 mg  5 mg Oral QHS PRN 5 mg at 01/30/23 2020    milrinone (PRIMACOR) 20 MG/100 ML D5W infusion  0.2 mcg/kg/min IntraVENous CONTINUOUS 0.2 mcg/kg/min at 01/30/23 1030    heparin (porcine) pf 500 Units  500 Units InterCATHeter DAILY 500 Units at 01/31/23 0847       ROS (besides HPI):    General: +fever.  No weight changes  ENT: No hearing loss or visual changes  Cardiovascular: No Chest pain. +DONALDSON  Pulmonary: + SOB  GI: No abdominal pain. No Nausea/Vomiting/Diarrhea. No blood in stool  : No blood in urine. No foamy or cloudy urine  Musculoskeletal: No joint swelling or redness. No morning stiffness  Endocrine: no cold or heat intolerance  Psych: denies anxiety or depression  Neuro: No light headedness or dizziness    Objective   Visit Vitals  BP (!) 110/58   Pulse 98   Temp 98.1 °F (36.7 °C)   Resp 25   Ht 5' 9\" (1.753 m)   Wt 55.9 kg (123 lb 3.8 oz)   SpO2 99%   BMI 18.20 kg/m²       Physical Exam:    Gen: NAD/Fatigued/Frail    HEENT: AT/NC, EOMI, moist mucous membrane, no scleral icterus    Neck: no JVD, no cervical lymphadenopathy, no carotid bruit    Lungs/Chest wall: Breath sounds normal. Symmetrical chest wall expansion. No accessory muscle use. Clear to auscultation    Cardiovascular: Normal S1/S2, normal rate, regular rhythm. Lilfevest    Abdomen: soft, NT, ND, BS+, no HSM    Ext: no clubbing or cyanosis. LUE edema    Skin: warm and dry. No rashes    : Vasquez catheter in place    CNS: alert awake. Answers appropriately.      Labs/Data:    Lab Results   Component Value Date/Time    Sodium 131 (L) 01/31/2023 03:23 AM    Potassium 5.4 (H) 01/31/2023 03:23 AM    Chloride 102 01/31/2023 03:23 AM    CO2 22 01/31/2023 03:23 AM    Anion gap 7 01/31/2023 03:23 AM    Glucose 221 (H) 01/31/2023 03:23 AM    BUN 34 (H) 01/31/2023 03:23 AM    Creatinine 2.09 (H) 01/31/2023 03:23 AM    BUN/Creatinine ratio 16 01/31/2023 03:23 AM    GFR est AA 46 (L) 06/23/2022 09:40 AM    GFR est non-AA 38 (L) 06/23/2022 09:40 AM    eGFR 33 (L) 01/31/2023 03:23 AM    eGFR 69 01/25/2023 11:55 AM    Calcium 9.1 01/31/2023 03:23 AM       Lab Results   Component Value Date/Time    WBC 7.6 01/31/2023 03:23 AM    HGB 9.1 (L) 01/31/2023 03:23 AM    HCT 30.6 (L) 01/31/2023 03:23 AM    PLATELET 026 11/19/4665 03:23 AM    MCV 83.6 01/31/2023 03:23 AM Urine analysis: No results found for this or any previous visit.           No components found for: SPEP, UPEP  No results found for: PUQ, PROTU2, PROTU1, BJP1, CPE1, IMEL1, MET2  Lab Results   Component Value Date/Time    Microalbumin/Creat ratio (mg/g creat) 273 (H) 01/27/2023 11:35 AM    Microalbumin,urine random 8.32 01/27/2023 11:35 AM         Intake/Output Summary (Last 24 hours) at 1/31/2023 1005  Last data filed at 1/31/2023 9395  Gross per 24 hour   Intake 904.28 ml   Output 2100 ml   Net -1195.72 ml       Wt Readings from Last 3 Encounters:   01/31/23 55.9 kg (123 lb 3.8 oz)   01/25/23 55.8 kg (123 lb)   01/23/23 54.9 kg (121 lb)       Signed by:  Ward Paniagua MD  Nephrology and Hypertension  [facility]

## 2023-01-31 NOTE — DIABETES MGMT
[facility]  DIABETES MANAGEMENT CONSULT    Consulted by  Elvira Elizabeth MD   for advanced nursing evaluation and care for inpatient blood glucose management. Evaluation and Action Plan   Anna Webber is a 71year old gentleman, with Type 2 Diabetes with a recent A1C of 7.7%, who is admitted with arrhythmias and acute on chronic HFrEF with failure to respond to inotrope support. He was discharged one week prior from a prolonged hospital stay for acute on chronic HF and LVAD work-up and discharged home on dobutamine with a lifevest.  Glucose control was tenuous during his previous admission s/t multiple co-morbidities, several tests/procedures requiring NPO status, waxing and waining oral intake. At this time glucose is impacted by:  Heart Failure with reduced EF 25-30%  Milrinone therapy  TANNER: GFR 33  Infection: UTI and PNA, IV antibiotics   Oral intake     Last admission, he required low basal doses but high bolus doses with meals to offset pre-prandial hyperglycemia. He is experiencing pre-prandial hyperglycemia now and will resume bolus humalog. Individualized BG target is closer to 180mg/dl. Management Rationale Action Plan   Medication   Basal needs Using 0.2 units/kg/D Lantus 10 units daily   Nutritional needs Using resistant sensitivity based on degree of pre-prandial hyperglycemia Starting 4 units Humalog/meal    Hold if patient is NPO or consumes less than 50% of carbohydrates on meal tray     Corrective insulin Using normal sensitivity  Normal sensitivity ACHS    Will reduce to high sensitivity if GFR below 20   Referral [x] Inpatient nutrition services   Additional orders  Carb consistent diet. Additional recommendations per RD: they were very helpful last admission with malnutrition, poor appetite, and hyperglycemia.              Initial Presentation   Anna Webber is a 71 y.o. male who presented to the ED 1/26/23 with lower extremity swelling and difficulty breathing. He had a prolonged hospital admission from 12/22/22-1/19/23 for acute on chronic systolic HF and LVAD work-up. He was discharged with home inotrope. LAB: , GFR 58, Trop 2003, BNP 4524  CXR: New diffuse bilateral increased interstitial markings, partially obscuring bilateral pulmonary nodules. HX:   Past Medical History:   Diagnosis Date    CAD (coronary artery disease) 11/10/2016    NSTEMI & 2 stents    Deafness 10/28/2012    DM (diabetes mellitus) ([facility] [address]     Elevated cholesterol     Hypertension     NSTEMI (non-ST elevated myocardial infarction) ([facility] [address] 11/10/2016        INITIAL DX:   CHF (congestive heart failure) ([facility] [address] [I50.9]     Current Treatment     TX: Milrinone, Amio. Specialty consultations: [facility], Cardiology, EP, GI     Hospital Course   Clinical progress has been complicated by:    0/57: Admission. Rapid response for SVT- Given adenosine x2, amio bolus/gtt  1/27: Advanced HFC consult. EP consult ordered. Intolerance of inotropic support. Cardiology consult. NSTEMI, heparin gtt started. Seen by EP: Continue amio. 1/28: Febrile: CT chest 1/28 shows diffuse focal opacities concerning for pneumonia. Obtain blood cultures, UA. Pathology report 1/18/23: well differentiated neuroendocrine tumor grade 1. EGD: Patchy erythema gastric body, biopsies done. 6 mm sessile polyp gastric body biopsied  1/30: Oncology consult: Recommend multiphase CT of the abdomen and pelvis to evaluate for metastatic spread. Tachycardia and SOB, BiPAP overnight.    Diabetes History   Type 2 Diabetes  Ambulatory BG management provided by: PCP Mary Ames MD    Diabetes-related Medical History  Acute complications  Acute hyperglycemia  Neurological complications  Peripheral neuropathy  Microvascular disease  Nephropathy  Macrovascular disease  CAD  Other associated conditions                                       CHF     Diabetes Medication History  Key Antihyperglycemic Medications Diabetes Medication History  Key Antihyperglycemic Medications               metFORMIN (GLUCOPHAGE) 500 mg tablet (Taking) Take 1 Tablet by mouth two (2) times daily (with meals) for 30 days. glipiZIDE (GLUCOTROL) 5 mg tablet (Taking) Take 1 tablet by mouth twice daily    Januvia 50 mg tablet (Taking) Take 1 tablet by mouth once daily             Diabetes self-management practices:   Eating pattern                Eats 3 small meals daily  [x]         Breakfast                         2 Eggs, Coffee  [x]         Lunch                               [place]  [x]         Dinner                              \"N[place]e Food\" Bread, rice, lentils   [x]         Bedtime                           [place]  [x]         Snacks                              Afternoon snack  [x]         Beverages                        Water, Coffee  Physical activity pattern                Sedentary   Monitoring pattern  Discharged last week with a [place] CGM and serum glucometer  7 day average Ba-9a: 129-164  9a-12: 243  Noon to 9p: 198-238  1 low BG in the last week overnight    Taking medications pattern  [x]         Consistent administration  [x]         Affordable  Social determinants of health impacting diabetes self-management practices   Concerned that you need to know more about how to stay healthy with diabetes  Overall evaluation:                 [x]         Achieving A1c target with drug therapy & self-care practices    Subjective   \"I am back\"     Objective   Physical exam  General Underweight [place] [facility] male in acute distress/ill-appearing. Life vest alarming. Neuro  Alert, oriented   Vital Signs Visit Vitals  BP (!) 110/58   Pulse 98   Temp 98.1 °F (36.7 °C)   Resp 25   Ht 5' 9\" (1.753 m)   Wt 55.9 kg (123 lb 3.8 oz)   SpO2 99%   BMI 18.20 kg/m²     Skin  Warm and dry. No acanthosis noted along neckline. Heart   Regular rate and rhythm.  No murmurs, rubs or gallops  Lungs  Clear to auscultation without rales or rhonchi  Extremities No foot wounds        Laboratory  Recent Labs     23  0323 23  0353 23  0048   * 261* 280*   AGAP 7 7 6   WBC 7.6 8.0 8.8   CREA 2.09* 1.88* 1.78*       Factors impacting BG management  Factor Dose Comments   Nutrition:  Standard meals     60 grams/meal      Heart Failure/NSTEMI/Ischemic cardiomyopathy/Arrhythmias  Milrinone  BNP 5174  EF 25-30%    Infection UTI/PNA  Urine Cx pending   IV antibiotics   FEvers    Other:   Kidney function TANNER on CKD:   Current GFR 33      Blood glucose pattern    Significant diabetes-related events over the past 24-72 hours  UA on admission: 100 glucose, yeast, moderate leuk esterase  A1C 7.7% 23  Fasting B  Pre-prandial: 293-385  Basal: Lantus 10 units  Bolus: None  Correction: 14 units in the last 24h  Eating      Assessment and Nursing Intervention   Nursing Diagnosis Risk for unstable blood glucose pattern   Nursing Intervention Domain 5250 Decision-making Support   Nursing Interventions Examined current inpatient diabetes/blood glucose control   Explored factors facilitating and impeding inpatient management  Explored corrective strategies with patient and responsible inpatient provider   Informed patient of rational for insulin strategy while hospitalized     Nursing Diagnosis 45452 Ineffective Health Management   Nursing Intervention Domain 5250 Decision-making Support   Nursing Interventions Identified diabetes self-management practices impeding diabetes control  Discussed diabetes survival skills related to  Healthy Plate eating plan; given handouts  Role of physical activity in improving insulin sensitivity and action  Procedure for blood glucose monitoring & options for low-cost products  Medications plan at discharge     Billing Code(s)     [x] 61446  initial hospital care - 55 minutes     Before making these care recommendations, I personally reviewed the hospitalization record, including notes, laboratory & diagnostic data and current medications, and examined the patient at the bedside (circumstances permitting) before determining care. More than fifty (50) percent of the time was spent in patient counseling and/or care coordination.   Total minutes: 54    SLICK Valdes  Diabetes Clinical Nurse Specialist  [facility]  Access via [employer]

## 2023-01-31 NOTE — PROGRESS NOTES
Consult received   Patient previously seen by Nephrology Specialist  Dr. Shayla Greenwood aware of consult

## 2023-01-31 NOTE — PROGRESS NOTES
Physician Progress Note      Caty Lee  [facility] #:                  [id number]  :                       1950  ADMIT DATE:       2023 4:17 PM  [address] [place] [place] DATE:  RESPONDING  PROVIDER #:        Yvette Jefferson MD          QUERY TEXT:    Patient admitted with NSTEMI If possible, please document in progress notes and discharge summary if you are evaluating and /or treating any of the following: The medical record reflects the following:  Risk Factors: muscle loss  Clinical Indicators:   nutritional consult  Malnutrition Assessment:  Malnutrition Status: Moderate malnutrition (23 1232)  Context:  Acute illness  Findings of the 6 clinical characteristics of malnutrition:  Energy Intake:  75% or less of est energy req for 7 or more days  Weight Loss:  5% over one month  Body Fat Loss:  Mild body fat loss, Buccal region  Muscle Mass Loss:  Mild muscle mass loss, Clavicles (pectoralis & deltoids), Thigh (quadriceps)  Fluid Accumulation:  Mild, Extremities   Strength:  Not performed    Nutrition Diagnosis:  ? Inadequate oral intake related to inadequate protein-energy intake as evidenced by intake 26-50%  ? Underweight related to inadequate protein-energy intake as evidenced by BMI (18)  ? BMI: 18.05 kg/m 2  Ht: 5' 9\" (1.753 m)  Wt: 55.4 kg (122 lb 3.2 oz)    Treatment: nutritional consult, Nutrition Interventions: Food and/or Nutrient Delivery: Continue current diet, Start oral nutrition supplement    Thank you,  Patito Ku RN, CDI, CRCR, CCDS  Certified Clinical Documentation   [phone]  You can also contact me via [address] [place] [address]. ASPEN Criteria:  https://aspenjournals. onlinelibrary. angulo. com/doi/full/10.1177/6086324951705926  Options provided:  -- Protein calorie malnutrition moderate with a BMI of 18.05 kg/m 2  -- Underweight with BMI 18.05 kg/m 2  -- Other - I will add my own diagnosis  -- Disagree - Not applicable / Not valid  -- Disagree - Clinically unable to determine / Unknown  -- Refer to Clinical Documentation Reviewer    PROVIDER RESPONSE TEXT:    This patient is underweight with a BMI of 18.05 kg/m 2.     Query created by: Amanda Ceron on 1/31/2023 3:56 PM      Electronically signed by:  Debby Hess MD 1/31/2023 4:49 PM

## 2023-01-31 NOTE — PROGRESS NOTES
Physical Therapy:  1/31/2023    Chart reviewed in preparation for PT treatment. Entered room to find patient eating lunch and patient/ patient's family requesting PT defer at this time. Will defer per patient request and continue to follow as able.     Thank you,  Bev Mason, PT, DPT

## 2023-01-31 NOTE — PROGRESS NOTES
[facility]ist [facility]                                                                                          Hospitalist Progress Note  Ortega Hernandez MD  Answering service: [phone] from in house phone        Date of Service:  2023  NAME:  Romain Wright  :  1950  MRN:  [id number]       Admission Summary:   HPI: \"Neal Kendall is a 71 y.o. male with history of ischemic cardiomyopathy, CAD status post PCI x2 HFrEF EF 25-30 stage D, NYHA class IIIb recently placed on milrinone drip as bridge to LVAD therapy, type 2 diabetes, hypertension, dyslipidemia, BPH, dyslipidemia, TRISTAN, CKD stage III who presents to hospital with complaints of elevated heart rate, difficulty with urination. Patient and wife state he had felt only mild improvement since discharge from hospital after his recent hospitalization. He was scheduled for outpatient voiding trial with urology and had Vasquez catheter removed. Wife states patient was able to void about 140 mL. Wife states since discharge, she has noted orthopnea, persistent tachycardia with heart rates in 120s and low BPs with systolics in the 48J as well as bilateral lower extremity edema. Patient however states he has not experienced any significant breathing issues. The patient denies any fever, chills, chest or abdominal pain, nausea, vomiting, cough, congestion, recent illness, palpitations, or dysuria. Remarkable vitals on ER Presentation: Heart rate 126  Labs Remarkable for: HGB 8.6, TROP 2033, glu 198, cr 1.36, bnp   ER Images: cxr: New diffuse bilateral increased interstitial markings, partially  obscuring bilateral pulmonary nodules. CT can be performed for further  evaluation, as indicated. \"       Interval history / Subjective:   Patient seen examined at bedside earlier. Wife was present at bedside. Patient still sob, franco noted lasix held .       Assessment & Plan:      Decompensated HFrEF LVEF 25-30% / Ischemic Cardiomyopathy / Milrinone gtt  CAD s/p CABG x2  Aflutter RVR   NSTEMI  -Continue amiodarone po stable off amio gtt .  Appreciate EP, cardio and AHF  -cont milrinone gtt   -Appreciate EP plan to reduce amnio 40 mg every day after total 14 days at 400 mg twice daily then 200 daily  -if not candidate for lvad recommend biventricular ICD and AV node ablation, defer to cardio/ahf or timing of this   -Continue diuresis with Lasix 40 mg IV twice daily, need to watch kidney function closely as cr has slight uptrend   -stopped dig   -Appreciate EP recs stop heparin gtt start Eliquis  -Follow lytes goal K greater than 4, mag greater than 2  -Strict I's and O's  - toprol 12.5 bid, cannot tolerate ACE/ARB due to hypotension, cannot tolerate spironolactone secondary to hyperkalemia, cannot tolerate Jardiance due to significant diuresis lower doses  -stopped corlanor due to above  On lifevest   -PTOT  -lvad workup per AHF  -will add on lfts to see if any liver involvement     LUE DVT  -confirmed on US LUE 1/30  -Eliquis to DVT dosing 10 BID 7 days followed by 5 mg BID will need min 3 months trt     Fever  CAP  100.7 1/28  - CXR on admission shows diffuse focal opacities concerning for pneumonia  Blood cultures no growth to date urine culture neg, ua likely contaminate   - CT chest 1/29: showed pulmonary edema and focal opacities bilaterally consistent with pneumonia as well as CHF  - COVID-negative, flu mycoplasma neg, legionella neg  - cont Rocephin, Doxy treat for 5 days last dose 2/2    + Pathology report 1/18 Neuroendocrine tumor  - Biopsy from EGD came back with well differentiated neuroendocrine tumor grade 1  - oncology consulted,ordered chromogranin/markers, since kidney function precludes undergoing staging Cts, defer to onc prognosis     BPH with urinary retention  -Failed recent voiding trial  -donnelly in place, to remain   -urology consulted, donnelly to remain      Gurpreet on CKD stage III   Hyperkalemia  -Hold IV Lasix  - [facility] nephrology recs   GERD -chronic Continue PPI     PAD  / S/p Right SFA PTCA -followed by Dr. Susan Morfin   -Normal ABIs on recent admission     Critically ill, extremely high risk for decompensation. CCT time 45 minutes    Had extensive discussion with wife in person and patient's 2 children over the phone. Patient has had multiple ongoing issues between renal function, DVT end-stage heart failure, neuro endocrine tumor. They state their primary goal is to keep patient from suffering  and have expressed interest in palliative involvement and potential hospice consideration, for which I have consulted them. I also discussed with acute heart failure team about clinical prognosis and have asked them to speak to family directly about occasions and risks of LVAD so patient family can make sound decision on what their goal is.  They are in agreement for this     Code status: full   Prophylaxis: scd  Care Plan discussed with: patient/family at bedside, nurse, case management  Anticipated Disposition: Greater than 48 hours, tbd   Cardiac monitoring: xTelemetry   Inpatient      Hospital Problems  Date Reviewed: 1/24/2023            Codes Class Noted POA    [facility] ICD-10-CM: I50.9  ICD-9-CM: 428.0  12/12/2022 Unknown         Social Determinants of Health     Tobacco Use: Low Risk     Smoking Tobacco Use: Never    Smokeless Tobacco Use: Never    Passive Exposure: Never   Alcohol Use: Not on file   Financial Resource Strain: Medium Risk    Difficulty of Paying Living Expenses: Somewhat hard   Food Insecurity: Food Insecurity Present    Worried About Running Out of Food in the Last Year: Never true    Ran Out of Food in the Last Year: Often true   Transportation Needs: Not on file   Physical Activity: Not on file   Stress: Not on file   Social Connections: Not on file   Intimate Partner Violence: Not on file   Depression: Not at risk    PHQ-2 Score: 0   Housing Stability: Not on file       Review of Systems:   A comprehensive review of systems was negative except for that written in the HPI. Vital Signs:    Last 24hrs VS reviewed since prior progress note. Most recent are:  Visit Vitals  BP (!) (P) 110/43 (BP Patient Position: Sitting)   Pulse (!) (P) 103   Temp 98.9 °F (37.2 °C)   Resp 29   Ht 5' 9\" (1.753 m)   Wt 55.9 kg (123 lb 3.8 oz)   SpO2 (P) 98%   BMI 18.20 kg/m²         Intake/Output Summary (Last 24 hours) at 1/31/2023 1507  Last data filed at 1/31/2023 1128  Gross per 24 hour   Intake 904.28 ml   Output 2350 ml   Net -1445.72 ml          Physical Examination:     I had a face to face encounter with this patient and independently examined them on 1/31/2023 as outlined below:          Constitutional:  No acute distress, cooperative, pleasant    ENT:  Oral mucosa moist, oropharynx benign. Resp:  Crackles bl bases. No wheezing/rhonchi/rales. No accessory muscle use. CV:  Regular rhythm, normal rate, no murmurs, gallops, rubs    GI:  Soft, non distended, non tender. normoactive bowel sounds, no hepatosplenomegaly     Musculoskeletal:  trace edema, warm, 2+ pulses throughout    Neurologic:  Moves all extremities.   AAOx3, CN II-XII reviewed            Data Review:    Review and/or order of clinical lab test  Review and/or order of tests in the radiology section of CPT  Review and/or order of tests in the medicine section of CPT    I have independently reviewed and interpreted patient's lab and all other diagnostic data    Labs:     Recent Labs     01/31/23  0323 01/30/23  0353   WBC 7.6 8.0   HGB 9.1* 8.5*   HCT 30.6* 29.5*    297       Recent Labs     01/31/23  0323 01/30/23  0353 01/29/23  0048   * 131* 132*   K 5.4* 4.8 4.4    101 102   CO2 22 23 24   BUN 34* 27* 24*   CREA 2.09* 1.88* 1.78*   * 261* 280*   CA 9.1 9.0 8.8   MG 1.7 1.7 1.9       Recent Labs     01/31/23  0323   ALT 16      TBILI 0.5   TP 6.9   ALB 3.1*   GLOB 3.8 No results for input(s): INR, PTP, APTT, INREXT, INREXT in the last 72 hours. No results for input(s): FE, TIBC, PSAT, FERR in the last 72 hours. Lab Results   Component Value Date/Time    Folate 10.5 07/03/2018 09:24 AM        No results for input(s): PH, PCO2, PO2 in the last 72 hours. No results for input(s): CPK, CKNDX, TROIQ in the last 72 hours. No lab exists for component: CPKMB  Lab Results   Component Value Date/Time    Cholesterol, total 170 01/12/2023 05:00 AM    HDL Cholesterol 40 01/12/2023 05:00 AM    LDL, calculated 87 01/12/2023 05:00 AM    Triglyceride 215 (H) 01/12/2023 05:00 AM    CHOL/HDL Ratio 4.3 01/12/2023 05:00 AM     Lab Results   Component Value Date/Time    Glucose (POC) 309 (H) 01/31/2023 11:44 AM    Glucose (POC) 215 (H) 01/31/2023 06:51 AM    Glucose (POC) 385 (H) 01/30/2023 08:13 PM    Glucose (POC) 323 (H) 01/30/2023 04:24 PM    Glucose (POC) 293 (H) 01/30/2023 11:26 AM     Lab Results   Component Value Date/Time    Color YELLOW/STRAW 01/28/2023 12:11 PM    Appearance HAZY (A) 01/28/2023 12:11 PM    Specific gravity 1.010 01/28/2023 12:11 PM    Specific gravity 1.013 01/01/2023 09:13 AM    pH (UA) 5.5 01/28/2023 12:11 PM    Protein TRACE (A) 01/28/2023 12:11 PM    Glucose 100 (A) 01/28/2023 12:11 PM    Ketone Negative 01/28/2023 12:11 PM    Bilirubin Negative 01/28/2023 12:11 PM    Urobilinogen 1.0 01/28/2023 12:11 PM    Nitrites Negative 01/28/2023 12:11 PM    Leukocyte Esterase MODERATE (A) 01/28/2023 12:11 PM    Epithelial cells FEW 01/28/2023 12:11 PM    Bacteria Negative 01/28/2023 12:11 PM    WBC 5-10 01/28/2023 12:11 PM    RBC  01/28/2023 12:11 PM       Notes reviewed from all clinical/nonclinical/nursing services involved in patient's clinical care. Care coordination discussions were held with appropriate clinical/nonclinical/ nursing providers based on care coordination needs.          Patients current active Medications were reviewed, considered, added and adjusted based on the clinical condition today. Home Medications were reconciled to the best of my ability given all available resources at the time of admission. Route is PO if not otherwise noted.       Medications Reviewed:     Current Facility-Administered Medications   Medication Dose Route Frequency    traZODone (DESYREL) tablet 50 mg  50 mg Oral QHS PRN    insulin lispro (HUMALOG) injection 4 Units  4 Units SubCUTAneous TID WITH MEALS    insulin glargine (LANTUS) injection 10 Units  10 Units SubCUTAneous DAILY    apixaban (ELIQUIS) tablet 10 mg  10 mg Oral BID    Followed by    Darwin Cornejo ON 2/6/2023] apixaban (ELIQUIS) tablet 5 mg  5 mg Oral BID    [Held by provider] potassium chloride SR (KLOR-CON 10) tablet 20 mEq  20 mEq Oral BID    cefTRIAXone (ROCEPHIN) 1 g in 0.9% sodium chloride 10 mL IV syringe  1 g IntraVENous Q24H    doxycycline (VIBRAMYCIN) 100 mg in 0.9% sodium chloride (MBP/ADV) 100 mL MBP  100 mg IntraVENous Q12H    glucose chewable tablet 16 g  4 Tablet Oral PRN    glucagon (GLUCAGEN) injection 1 mg  1 mg IntraMUSCular PRN    dextrose 10 % infusion 0-250 mL  0-250 mL IntraVENous PRN    insulin lispro (HUMALOG) injection   SubCUTAneous AC&HS    balsam peru-castor oiL (VENELEX) ointment   Topical BID    metoprolol succinate (TOPROL-XL) XL tablet 12.5 mg  12.5 mg Oral Q12H    lidocaine 4 % patch 1 Patch  1 Patch TransDERmal Q24H    amiodarone (CORDARONE) tablet 400 mg  400 mg Oral BID    aspirin delayed-release tablet 81 mg  81 mg Oral DAILY    sodium chloride (NS) flush 5-40 mL  5-40 mL IntraVENous Q8H    sodium chloride (NS) flush 5-40 mL  5-40 mL IntraVENous PRN    acetaminophen (TYLENOL) tablet 650 mg  650 mg Oral Q6H PRN    Or    acetaminophen (TYLENOL) suppository 650 mg  650 mg Rectal Q6H PRN    polyethylene glycol (MIRALAX) packet 17 g  17 g Oral DAILY PRN    ondansetron (ZOFRAN ODT) tablet 4 mg  4 mg Oral Q8H PRN    Or    ondansetron (ZOFRAN) injection 4 mg  4 mg IntraVENous Q6H PRN [Held by provider] furosemide (LASIX) injection 40 mg  40 mg IntraVENous BID    heparin (porcine) pf 500 Units  500 Units InterCATHeter PRN    pantoprazole (PROTONIX) tablet 40 mg  40 mg Oral ACB    rosuvastatin (CRESTOR) tablet 20 mg  20 mg Oral QHS    milrinone (PRIMACOR) 20 MG/100 ML D5W infusion  0.2 mcg/kg/min IntraVENous CONTINUOUS    heparin (porcine) pf 500 Units  500 Units InterCATHeter DAILY     ______________________________________________________________________  EXPECTED LENGTH OF STAY: 4d 2h  ACTUAL LENGTH OF STAY:          5                 Jen Greenwood MD

## 2023-01-31 NOTE — CONSULTS
Palliative Medicine Consult  Zeferino: [phone])    Patient Name: Sandra Kraus  YOB: 1950    Date of Initial Consult: 1/31/2023  Reason for Consult: Byg[address] Discussion  Requesting Provider: Dr. Ilan Eagle  Primary Care Physician: Francis Chen MD     SUMMARY:   Sandra Kraus is a 71 y.o. who was admitted on 1/26/2023 from home with a diagnosis of Acute on chronic CHF, SVT. Mr. Liz Jeong presented to Er w/ cc of elevated heart rate and persistent difficulty urinating since donnelly catheter removal. . Wife also reported since discharge, he has had orthopnea, persistent tachycardia in 120s, low BP and bilateral lower extremity edema. Mr. Liz Jeong was discharged 7 days ago from a prolonged and complicated [id number]/74-2/[id number]/7063,  2/2 decompensated heart failure, TANNER//bilat outlet obstruction resulting in bilat hydronephroses and urinary retention as well as hypoxic respiratory failure 2/2 suspected PNA., Once  stabilzed he was discharged home on IV milrinone and Life Vest as bridge to LVAD. Prior to this he was hospitalized 12/11-12/16/2022 for CHF exacerbation and before that he was hospitalized  12/5-12/8/22 for NSTEMI and cath. PMH: Cardiomyopathy w/ EF 25-30% range, on home IV Milrinone and Life Vest as bridge to LVAD, CHF, CAD, s/p CABG, NSTEMI x2 stents, deafness (+hearing aide, hears best in left ear), PAD, HTN, HLD, DM 2, CKD. Current medical issues leading to Palliative Medicine involvement include: LVAD candidacy work up. PALLIATIVE DIAGNOSES:   Palliative care encounter  End-stage heart failure  Poor appetite  LVAD work-up  Weakness, generalized   DNR discussion  Goals of care discussion         PLAN:   Prior to visit completed extensive chart review , including review of mars, vitals, labs, imaging and documentation from current hospitalization and prior hospitalization.   I also discussed with patient's nurse Lincoln Pickens, attending physician Dr. Ilan Eagle and Mr Liz Jeong' s cardiologist Dr. Selvin jasso/ the advanced heart failure team.    I met with Mr. Kendall, his wife was at bedside. .  Mr. Kendall was sitting up in bedside chair, sleeping, though he woke easily. MrJustino and Mrs. Kendall recalled meeting me during prior hospitalization, they had been expecting my visit. Mrs. Kendall called her son Desiree Gardner and daughter Erika Archibald, had them participate via speaker phone. 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 Patient and his Family are aware that determination of his LVAD candidacy is still pending, but while waiting for a definitve answer, they have asked to begin End of Life discussions to prepare both Mr. Kendall and family for the possibility of either Mr. Kendall refusing LVAD or should he be found ineligible. Mr. Johann Jackson shared that he is uncertain if he even wants the LVAD anymore. He describes his journey over the past year as a series of procedures, that nothing seemed to fix the problem, that he has continued to have setbacks, and has gotten weaker and more fatigued over time. He also stated that he has spent a lot of time in the hospital, that he has to wait for someone to help him and he just doesn't know if he could handle being hospitalized x a few weeks again. We discussed possibility of Mr. Kendall being discharged home on with IV dobutamine, with goal of being weaned off with the support of Hospice. Talked about use of comfort medications, usually with opioid,  to mange distressing symptoms, specifically SOB, but goal would be comfort, not to return to hospital, would expect he would pass at home or in [facility] if needed higher level of care due to uncontrolled symptoms/. Byg[address]- Continue with current level of care. This was information session only.  Mr. Johann Jackson is considering all options, while waiting  get more information back from Oncology and heart failure team in coming days. I gave Mrs. Kendall Palliative business card, encouraged her to call if any questions or concerns. I will not be here the rest of the week, but will be back on Monday. Our team will chart check the remainder of the week. If Palliative should FU, please contact our [address] Thank you for including the palliative team, we appreciate the opportunity to be of service to Mr. Kendall and his family. Initial consult note routed to primary continuity provider and/or primary health care team members  Communicated plan of care with: Palliative IDT, [facility] Team, Irma RN, Inga Duke NP, Dr. Estefanía Saldana. Adv. Heart Failure [facility] Rosado      GOALS OF CARE / TREATMENT PREFERENCES:     GOALS OF CARE: Undecided       TREATMENT PREFERENCES:   Code Status: Full Code    Patient and family's personal goals include: Undecided at this time    Important upcoming milestones or family events: did not address    The patient identifies the following as important for living well:       Advance Care Planning:  [x] [facility] Interdisciplinary Team has updated the ACP Navigator with Health Care Decision Maker and Patient Capacity      Primary Decision Maker: Marysol Bobjulien - [phone]    Secondary Decision Maker: Earlene Martinez - [phone]  Advance Care Planning 12/22/2022   Patient's Healthcare Decision Maker is: Legal Next of Saida 70   Primary Decision Maker Name -   Primary Decision Maker Phone Number -   Primary Decision Maker Relationship to Patient -   Confirm Advance Directive None   Patient Would Like to Complete Advance Directive No   Does the patient have other document types -               Other:    As far as possible, the palliative care team has discussed with patient / health care proxy about goals of care / treatment preferences for patient.      HISTORY:     History obtained from: medical record/patient/family    CHIEF COMPLAINT: Tired, Tired of being in hospital     HPI/SUBJECTIVE:    The patient is:   [x] Verbal and participatory  [] Non-participatory due to:   He denied pain, denied feeling short of breath, stated that he feels ok, doesn't like being in hospital      Clinical Pain Assessment (nonverbal scale for severity on nonverbal patients):              Duration: for how long has pt been experiencing pain (e.g., 2 days, 1 month, years)  Frequency: how often pain is an issue (e.g., several times per day, once every few days, constant)     FUNCTIONAL ASSESSMENT:     Palliative Performance Scale (PPS): 40          PSYCHOSOCIAL/SPIRITUAL SCREENING:     Palliative IDT has assessed this patient for cultural preferences / practices and a referral made as appropriate to needs (Cultural Services, Patient Advocacy, Ethics, etc.)    Any spiritual / Mu-ism concerns:  [] Yes /  [x] No   If \"Yes\" to discuss with pastoral care during IDT     Does caregiver feel burdened by caring for their loved one:   [x] Yes /  [] No /  [] No Caregiver Present/Available [] No Caregiver [] Pt Lives at [place] [address]  If \"Yes\" to discuss with social work during IDT     Anticipatory grief assessment:   [x] Normal  / [] Maladaptive     If \"Maladaptive\" to discuss with social work during IDT    ESAS Anxiety:      ESAS Depression:          REVIEW OF SYSTEMS:     Positive and pertinent negative findings in ROS are noted above in HPI. The following systems were [x] reviewed / [] unable to be reviewed as noted in HPI  Other findings are noted below. Systems: constitutional, ears/nose/mouth/throat, respiratory, gastrointestinal, genitourinary, musculoskeletal, integumentary, neurologic, psychiatric, endocrine. Positive findings noted below.   Modified ESAS Completed by: provider                                   Stool Occurrence(s): 1        PHYSICAL EXAM:     From RN flowsheet:  Wt Readings from Last 3 Encounters:   01/31/23 123 lb 3.8 oz (55.9 kg)   01/25/23 123 lb (55.8 kg)   01/23/23 121 lb (54.9 kg)     Blood pressure (!) 110/58, pulse 98, temperature 98.1 °F (36.7 °C), resp. rate 25, height 5' 9\" (1.753 m), weight 123 lb 3.8 oz (55.9 kg), SpO2 99 %.     Pain Scale 1: Numeric (0 - 10)  Pain Intensity 1: 0     Pain Location 1: Back  Pain Orientation 1: Lower, Medial  Pain Description 1: Pressure, Aching  Pain Intervention(s) 1: Medication (see MAR), Repositioned  Last bowel movement, if known:     Constitutional: Appears  stated age, thinner,  Eyes: pupils equal, anicteric  ENMT: no nasal discharge, moist mucous membranes  Cardiovascular: regular rhythm, distal pulses intact  Respiratory: breathing not labored, symmetric  Gastrointestinal: soft non-tender, +bowel sounds  Musculoskeletal: no deformity, no tenderness to palpation  Skin: warm, dry  Neurologic: drowsy, oriented, following commands, moving all extremities  Psychiatric: full affect, no hallucinations  Other:       HISTORY:     Active Problems:    CHF (congestive heart failure) ([facility] [address] (12/12/2022)    Past Medical History:   Diagnosis Date    CAD (coronary artery disease) 11/10/2016    NSTEMI & 2 stents    Deafness 10/28/2012    DM (diabetes mellitus) ([facility])     Elevated cholesterol     Hypertension     NSTEMI (non-ST elevated myocardial infarction) ([facility] [address] 11/10/2016      Past Surgical History:   Procedure Laterality Date    COLONOSCOPY N/A 6/28/2018    COLONOSCOPY performed by Steffanie Mcclure MD at [facility] ENDOSCOPY    COLONOSCOPY N/A 1/18/2023    COLONOSCOPY performed by Porsha Lebron MD at [facility] ENDOSCOPY    [address]  11/11/2016    2 stents      Family History   Problem Relation Age of Onset    Heart Disease Father     Heart Attack Father     Hypertension Mother     Elevated Lipids Brother     Elevated Lipids Brother     No Known Problems Sister     Elevated Lipids Brother     No Known Problems Son     No Known Problems Daughter     Anesth Problems Neg Hx       History reviewed, no pertinent family history. Social History     Tobacco Use    Smoking status: Never     Passive exposure: Never    Smokeless tobacco: Never   Substance Use Topics    Alcohol use:  Yes     Alcohol/week: 2.0 standard drinks     Types: 1 Cans of beer, 1 Shots of liquor per week     Comment: rarely     No Known Allergies   Current Facility-Administered Medications   Medication Dose Route Frequency    traZODone (DESYREL) tablet 50 mg  50 mg Oral QHS PRN    insulin glargine (LANTUS) injection 10 Units  10 Units SubCUTAneous DAILY    apixaban (ELIQUIS) tablet 10 mg  10 mg Oral BID    Followed by    Eve Bolden ON 2/6/2023] apixaban (ELIQUIS) tablet 5 mg  5 mg Oral BID    [Held by provider] potassium chloride SR (KLOR-CON 10) tablet 20 mEq  20 mEq Oral BID    cefTRIAXone (ROCEPHIN) 1 g in 0.9% sodium chloride 10 mL IV syringe  1 g IntraVENous Q24H    doxycycline (VIBRAMYCIN) 100 mg in 0.9% sodium chloride (MBP/ADV) 100 mL MBP  100 mg IntraVENous Q12H    glucose chewable tablet 16 g  4 Tablet Oral PRN    glucagon (GLUCAGEN) injection 1 mg  1 mg IntraMUSCular PRN    dextrose 10 % infusion 0-250 mL  0-250 mL IntraVENous PRN    insulin lispro (HUMALOG) injection   SubCUTAneous AC&HS    balsam peru-castor oiL (VENELEX) ointment   Topical BID    metoprolol succinate (TOPROL-XL) XL tablet 12.5 mg  12.5 mg Oral Q12H    lidocaine 4 % patch 1 Patch  1 Patch TransDERmal Q24H    amiodarone (CORDARONE) tablet 400 mg  400 mg Oral BID    aspirin delayed-release tablet 81 mg  81 mg Oral DAILY    sodium chloride (NS) flush 5-40 mL  5-40 mL IntraVENous Q8H    sodium chloride (NS) flush 5-40 mL  5-40 mL IntraVENous PRN    acetaminophen (TYLENOL) tablet 650 mg  650 mg Oral Q6H PRN    Or    acetaminophen (TYLENOL) suppository 650 mg  650 mg Rectal Q6H PRN    polyethylene glycol (MIRALAX) packet 17 g  17 g Oral DAILY PRN    ondansetron (ZOFRAN ODT) tablet 4 mg  4 mg Oral Q8H PRN    Or    ondansetron (ZOFRAN) injection 4 mg  4 mg IntraVENous Q6H PRN [Held by provider] furosemide (LASIX) injection 40 mg  40 mg IntraVENous BID    heparin (porcine) pf 500 Units  500 Units InterCATHeter PRN    pantoprazole (PROTONIX) tablet 40 mg  40 mg Oral ACB    rosuvastatin (CRESTOR) tablet 20 mg  20 mg Oral QHS    milrinone (PRIMACOR) 20 MG/100 ML D5W infusion  0.2 mcg/kg/min IntraVENous CONTINUOUS    heparin (porcine) pf 500 Units  500 Units InterCATHeter DAILY          LAB AND IMAGING FINDINGS:     Lab Results   Component Value Date/Time    WBC 7.6 01/31/2023 03:23 AM    HGB 9.1 (L) 01/31/2023 03:23 AM    PLATELET 242 00/97/8804 03:23 AM     Lab Results   Component Value Date/Time    Sodium 131 (L) 01/31/2023 03:23 AM    Potassium 5.4 (H) 01/31/2023 03:23 AM    Chloride 102 01/31/2023 03:23 AM    CO2 22 01/31/2023 03:23 AM    BUN 34 (H) 01/31/2023 03:23 AM    Creatinine 2.09 (H) 01/31/2023 03:23 AM    Calcium 9.1 01/31/2023 03:23 AM    Magnesium 1.7 01/31/2023 03:23 AM    Phosphorus 2.6 01/26/2023 08:38 PM      Lab Results   Component Value Date/Time    Alk.  phosphatase 121 (H) 01/27/2023 12:52 AM    Protein, total 6.6 01/27/2023 12:52 AM    Albumin 3.0 (L) 01/27/2023 12:52 AM    Globulin 3.6 01/27/2023 12:52 AM     Lab Results   Component Value Date/Time    INR 1.0 01/11/2023 06:14 PM    Prothrombin time 10.8 01/11/2023 06:14 PM    aPTT 51.7 (H) 01/27/2023 02:34 PM      Lab Results   Component Value Date/Time    Iron 81 01/04/2023 02:31 AM    TIBC 277 01/04/2023 02:31 AM    Iron % saturation 29 01/04/2023 02:31 AM    Ferritin 463 (H) 01/04/2023 02:31 AM      No results found for: PH, PCO2, PO2  No components found for: [place]   Lab Results   Component Value Date/Time    CK 87 01/04/2023 02:31 AM    CK - MB 5.3 (H) 11/10/2016 03:44 PM                Total time: min  Counseling / coordination time, spent as noted above: 20minutes  > 50% counseling / coordination?: yes    Prolonged service was provided for  []30 min   []75 min in face to face time in the presence of the patient, spent as noted above. Time Start:   Time End:   Note: this can only be billed with 88452 (initial) or 90032 (follow up). If multiple start / stop times, list each separately.

## 2023-01-31 NOTE — PROGRESS NOTES
Spiritual Care Partner Volunteer visited patient at [facility] in [facility] CV SERVICES UNIT on 1/31/2023   Documented by:  Chaplain Santos MDiv, MS, [facility]

## 2023-02-01 ENCOUNTER — TELEPHONE (OUTPATIENT)
Dept: CARDIOLOGY CLINIC | Age: 73
End: 2023-02-01

## 2023-02-01 NOTE — PROGRESS NOTES
Patient name: Anival Trimble  MRN: [id number]    Nephrology Progress note:    Assessment:  TANNER on CKD-3a: Cr up to 2mg/dl and now holding. Suspect cardiorenal effects with needed diuresis. Recent baseline Cr has been in the low 1s. Hyperkalemia: K bump to 5.4-> 2 to oral KCL/evolving TANNER. Better today s/p Lokelma dose yesterday     Ischemic CM: Recurrent exacerbations. EF 20%. On continuous Milrinone since last admission. 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 Reports SOB better overnight. Patient continues to look for clarity on how to proceed-> feels like he is stuck.     ROS:   No nausea, no vomiting  No chest pain, no shortness of breath    Exam:  Visit Vitals  BP (!) 105/40   Pulse 89   Temp 98.4 °F (36.9 °C)   Resp 24   Ht 5' 9\" (1.753 m)   Wt 56 kg (123 lb 7.3 oz)   SpO2 94%   BMI 18.23 kg/m²     Wt Readings from Last 3 Encounters:   02/01/23 56 kg (123 lb 7.3 oz)   01/25/23 55.8 kg (123 lb)   01/23/23 54.9 kg (121 lb)       Intake/Output Summary (Last 24 hours) at 2/1/2023 1108  Last data filed at 2/1/2023 0900  Gross per 24 hour   Intake 600 ml   Output 850 ml   Net -250 ml       Gen: NAD/Frail  HEENT: AT/NC  Lungs/Chest wall: Clear. No accessory muscle use. Cardiovascular: Regular rate, normal rhythm. Abdomen/: Soft, NT, ND, BS+. +Vasquez  Ext: No peripheral edema  CNS: alert and awake.  Answers appropriately      Current Facility-Administered Medications   Medication Dose Route Frequency Last Admin    traZODone (DESYREL) tablet 50 mg  50 mg Oral QHS PRN 50 mg at 01/31/23 2035    insulin lispro (HUMALOG) injection 4 Units  4 Units SubCUTAneous TID WITH MEALS 4 Units at 02/01/23 0904    insulin glargine (LANTUS) injection 10 Units  10 Units SubCUTAneous DAILY 10 Units at 02/01/23 0903    apixaban (ELIQUIS) tablet 10 mg  10 mg Oral BID 10 mg at 02/01/23 0901    Followed by    Yajaira Busby ON 2/6/2023] apixaban (ELIQUIS) tablet 5 mg  5 mg Oral BID      [Held by provider] potassium chloride SR (KLOR-CON 10) tablet 20 mEq  20 mEq Oral BID 20 mEq at 01/30/23 1030    cefTRIAXone (ROCEPHIN) 1 g in 0.9% sodium chloride 10 mL IV syringe  1 g IntraVENous Q24H 1 g at 01/31/23 1245    doxycycline (VIBRAMYCIN) 100 mg in 0.9% sodium chloride (MBP/ADV) 100 mL MBP  100 mg IntraVENous Q12H 100 mg at 02/01/23 0128    glucose chewable tablet 16 g  4 Tablet Oral PRN      glucagon (GLUCAGEN) injection 1 mg  1 mg IntraMUSCular PRN      dextrose 10 % infusion 0-250 mL  0-250 mL IntraVENous PRN      insulin lispro (HUMALOG) injection   SubCUTAneous AC&HS 3 Units at 01/31/23 1729    balsam peru-castor oiL (VENELEX) ointment   Topical BID Given at 02/01/23 0905    metoprolol succinate (TOPROL-XL) XL tablet 12.5 mg  12.5 mg Oral Q12H 12.5 mg at 02/01/23 0900    lidocaine 4 % patch 1 Patch  1 Patch TransDERmal Q24H 1 Patch at 01/31/23 1245    amiodarone (CORDARONE) tablet 400 mg  400 mg Oral  mg at 02/01/23 0901    aspirin delayed-release tablet 81 mg  81 mg Oral DAILY 81 mg at 02/01/23 0900    sodium chloride (NS) flush 5-40 mL  5-40 mL IntraVENous Q8H 10 mL at 02/01/23 0652    sodium chloride (NS) flush 5-40 mL  5-40 mL IntraVENous PRN      acetaminophen (TYLENOL) tablet 650 mg  650 mg Oral Q6H  mg at 01/28/23 0003    Or    acetaminophen (TYLENOL) suppository 650 mg  650 mg Rectal Q6H PRN      polyethylene glycol (MIRALAX) packet 17 g  17 g Oral DAILY PRN      ondansetron (ZOFRAN ODT) tablet 4 mg  4 mg Oral Q8H PRN 4 mg at 01/30/23 1357    Or    ondansetron (ZOFRAN) injection 4 mg  4 mg IntraVENous Q6H PRN 4 mg at 01/31/23 2035    [Held by provider] furosemide (LASIX) injection 40 mg  40 mg IntraVENous BID 40 mg at 01/30/23 0942    heparin (porcine) pf 500 Units  500 Units InterCATHeter PRN      pantoprazole (PROTONIX) tablet 40 mg  40 mg Oral ACB 40 mg at 02/01/23 9085    rosuvastatin (CRESTOR) tablet 20 mg  20 mg Oral QHS 20 mg at 01/31/23 2032    milrinone (PRIMACOR) 20 MG/100 ML D5W infusion  0.2 mcg/kg/min IntraVENous CONTINUOUS 0.2 mcg/kg/min at 01/30/23 1030    heparin (porcine) pf 500 Units  500 Units InterCATHeter DAILY 500 Units at 02/01/23 0902       Labs/Data:    Lab Results   Component Value Date/Time    WBC 6.3 02/01/2023 01:36 AM    HGB 8.0 (L) 02/01/2023 01:36 AM    HCT 27.7 (L) 02/01/2023 01:36 AM    PLATELET 979 90/95/8260 01:36 AM    MCV 84.7 02/01/2023 01:36 AM       Lab Results   Component Value Date/Time    Sodium 136 02/01/2023 01:36 AM    Potassium 4.9 02/01/2023 01:36 AM    Chloride 106 02/01/2023 01:36 AM    CO2 23 02/01/2023 01:36 AM    Anion gap 7 02/01/2023 01:36 AM    Glucose 94 02/01/2023 01:36 AM    BUN 33 (H) 02/01/2023 01:36 AM    Creatinine 2.08 (H) 02/01/2023 01:36 AM    BUN/Creatinine ratio 16 02/01/2023 01:36 AM    GFR est AA 46 (L) 06/23/2022 09:40 AM    GFR est non-AA 38 (L) 06/23/2022 09:40 AM    eGFR 33 (L) 02/01/2023 01:36 AM    eGFR 69 01/25/2023 11:55 AM    Calcium 9.0 02/01/2023 01:36 AM       Patient seen and examined. Chart reviewed. Labs, data and other pertinent notes reviewed in last 24 hrs.     Discussed with patient    Signed by:  Deep Eden Butcher MD  [facility]

## 2023-02-01 NOTE — PROGRESS NOTES
Mr Angel Riojas remains critically ill. Goals of care have been discussed with palliative team as well as oncology and AHF team. He is awaiting AHF team decision on candidacy for LVAD placement. Family and patient have expressed wishes to make him comfortable but they are not ready to move forward with palliative/hospice treatment options at this time. GI team is available if endoscopic intervention is requested per primary service otherwise we will follow along peripherally.      Fabiana Keys, NP

## 2023-02-01 NOTE — PROGRESS NOTES
[address] [facility] Adult  Hospitalist Group                                                                                          Hospitalist Progress Note  Rolly Machuca MD  Answering service: [phone] from in house phone        Date of Service:  2023  NAME:  Mariposa Osorio  :  1950  MRN:  [id number]       Admission Summary:   HPI: \"Neal Kendall is a 71 y.o. male with history of ischemic cardiomyopathy, CAD status post PCI x2 HFrEF EF 25-30 stage D, NYHA class IIIb recently placed on milrinone drip as bridge to LVAD therapy, type 2 diabetes, hypertension, dyslipidemia, BPH, dyslipidemia, TRISTAN, CKD stage III who presents to hospital with complaints of elevated heart rate, difficulty with urination. Patient and wife state he had felt only mild improvement since discharge from hospital after his recent hospitalization. He was scheduled for outpatient voiding trial with urology and had Vasquez catheter removed. Wife states patient was able to void about 140 mL. Wife states since discharge, she has noted orthopnea, persistent tachycardia with heart rates in 120s and low BPs with systolics in the 46S as well as bilateral lower extremity edema. Patient however states he has not experienced any significant breathing issues. The patient denies any fever, chills, chest or abdominal pain, nausea, vomiting, cough, congestion, recent illness, palpitations, or dysuria. Remarkable vitals on ER Presentation: Heart rate 126  Labs Remarkable for: HGB 8.6, TROP 2033, glu 198, cr 1.36, bnp   ER Images: cxr: New diffuse bilateral increased interstitial markings, partially  obscuring bilateral pulmonary nodules. CT can be performed for further  evaluation, as indicated. \"       Interval history / Subjective:   Patient seen examined at bedside earlier. Wife was present at bedside. Patient still sob, franco noted lasix held.       Assessment & Plan:      Decompensated HFrEF LVEF 25-30% / Ischemic Cardiomyopathy / Milrinone gtt  CAD s/p CABG x2  Aflutter RVR   NSTEMI  -Continue amiodarone po stable off amio gtt .  Appreciate EP, cardio and AHF  -cont milrinone gtt   -Appreciate EP plan to reduce amnio 40 mg every day after total 14 days at 400 mg twice daily then 200 daily  -if not candidate for lvad recommend biventricular ICD and AV node ablation, defer to cardio/ahf or timing of this   -lasix on hold cr uptrended, need to watch kidney function closely as cr has slight uptrend   -stopped dig   -Appreciate EP recs s/p heparin gtt start Eliquis  -Follow lytes goal K greater than 4, mag greater than 2  -Strict I's and O's  - toprol 12.5 bid, cannot tolerate ACE/ARB due to hypotension, cannot tolerate spironolactone secondary to hyperkalemia, cannot tolerate Jardiance due to significant diuresis lower doses  -stopped corlanor due to above  On lifevest   -PTOT  -lvad workup per AHF  -will add on lfts to see if any liver involvement     LUE DVT  -confirmed on US LUE 1/30  -Eliquis to DVT dosing 10 BID 7 days followed by 5 mg BID will need min 3 months trt     Fever  CAP  100.7 1/28  - CXR on admission shows diffuse focal opacities concerning for pneumonia  Blood cultures no growth to date urine culture neg, ua likely contaminate   - CT chest 1/29: showed pulmonary edema and focal opacities bilaterally consistent with pneumonia as well as CHF  - COVID-negative, flu mycoplasma neg, legionella neg  - cont Rocephin, Doxy treat for 5 days last dose 2/2    + Pathology report 1/18 Neuroendocrine tumor  - Biopsy from EGD came back with well differentiated neuroendocrine tumor grade 1  - oncology consulted,ordered chromogranin/markers, since kidney function precludes undergoing staging Ct at si time, per discussion with oncology this am, this would not preclude him from candidacy to vad    BPH with urinary retention  -Failed recent voiding trial  -donnelly in place, to remain   -urology consulted, donnelly to remain Gurpreet on CKD stage III   Hyperkalemia  -Hold IV Lasix  - s/p Lokelma  - Appreciate nephrology recs   GERD -chronic Continue PPI     PAD  / S/p Right SFA PTCA -followed by Dr. Enriqueta Olmos   -Normal ABIs on recent admission     Critically ill, extremely high risk for decompensation. CCT time 45 minutes    Appreciate palliative involvement. Plan for meeting with heart failure team , lvad surgeon/medical board directors on Friday to discuss case. Wife has expressed multiple times her goals is he keep him from suffering, I did offer information about hospice and what that would mean. They are waiting back for meeting on Friday before making final decision. I updated wife over the phone    Code status: full   Prophylaxis: scd  Care Plan discussed with: patient/family at bedside, nurse, case management  Anticipated Disposition: Greater than 48 hours, tbd   Cardiac monitoring: xTelemetry   Inpatient      Hospital Problems  Date Reviewed: 1/24/2023            Codes Class Noted POA    CHF (congestive heart failure) ([facility] [address] ICD-10-CM: I50.9  ICD-9-CM: 428.0  12/12/2022 Unknown         Social Determinants of Health     Tobacco Use: Low Risk     Smoking Tobacco Use: Never    Smokeless Tobacco Use: Never    Passive Exposure: Never   Alcohol Use: Not on file   Financial Resource Strain: Medium Risk    Difficulty of Paying Living Expenses: Somewhat hard   Food Insecurity: Food Insecurity Present    Worried About Running Out of Food in the Last Year: Never true    Ran Out of Food in the Last Year: Often true   Transportation Needs: Not on file   Physical Activity: Not on file   Stress: Not on file   Social Connections: Not on file   Intimate Partner Violence: Not on file   Depression: Not at risk    PHQ-2 Score: 0   Housing Stability: Not on file       Review of Systems:   A comprehensive review of systems was negative except for that written in the HPI. Vital Signs:    Last 24hrs VS reviewed since prior progress note.  Most recent are:  Visit Vitals  BP (!) 105/40   Pulse 89   Temp 98.4 °F (36.9 °C)   Resp 24   Ht 5' 9\" (1.753 m)   Wt 56 kg (123 lb 7.3 oz)   SpO2 94%   BMI 18.23 kg/m²         Intake/Output Summary (Last 24 hours) at 2/1/2023 1309  Last data filed at 2/1/2023 1256  Gross per 24 hour   Intake 360 ml   Output 825 ml   Net -465 ml          Physical Examination:     I had a face to face encounter with this patient and independently examined them on 2/1/2023 as outlined below:          Constitutional:  No acute distress, cooperative, pleasant    ENT:  Oral mucosa moist, oropharynx benign. Resp:  Crackles bl bases. No wheezing/rhonchi/rales. No accessory muscle use. CV:  Regular rhythm, normal rate, no murmurs, gallops, rubs    GI:  Soft, non distended, non tender. normoactive bowel sounds, no hepatosplenomegaly     Musculoskeletal:  trace edema, warm, 2+ pulses throughout    Neurologic:  Moves all extremities. AAOx3, CN II-XII reviewed            Data Review:    Review and/or order of clinical lab test  Review and/or order of tests in the radiology section of CPT  Review and/or order of tests in the medicine section of CPT    I have independently reviewed and interpreted patient's lab and all other diagnostic data    Labs:     Recent Labs     02/01/23 0136 01/31/23  0323   WBC 6.3 7.6   HGB 8.0* 9.1*   HCT 27.7* 30.6*    307       Recent Labs     02/01/23 0136 01/31/23  0323 01/30/23  0353    131* 131*   K 4.9 5.4* 4.8    102 101   CO2 23 22 23   BUN 33* 34* 27*   CREA 2.08* 2.09* 1.88*   GLU 94 221* 261*   CA 9.0 9.1 9.0   MG  --  1.7 1.7       Recent Labs     01/31/23  0323   ALT 16      TBILI 0.5   TP 6.9   ALB 3.1*   GLOB 3.8       No results for input(s): INR, PTP, APTT, INREXT, INREXT in the last 72 hours. No results for input(s): FE, TIBC, PSAT, FERR in the last 72 hours.    Lab Results   Component Value Date/Time    Folate 10.5 07/03/2018 09:24 AM        No results for input(s): PH, PCO2, PO2 in the last 72 hours. No results for input(s): CPK, CKNDX, TROIQ in the last 72 hours. No lab exists for component: CPKMB  Lab Results   Component Value Date/Time    Cholesterol, total 170 01/12/2023 05:00 AM    HDL Cholesterol 40 01/12/2023 05:00 AM    LDL, calculated 87 01/12/2023 05:00 AM    Triglyceride 215 (H) 01/12/2023 05:00 AM    CHOL/HDL Ratio 4.3 01/12/2023 05:00 AM     Lab Results   Component Value Date/Time    Glucose (POC) 257 (H) 02/01/2023 11:22 AM    Glucose (POC) 161 (H) 02/01/2023 06:25 AM    Glucose (POC) 130 (H) 01/31/2023 08:30 PM    Glucose (POC) 207 (H) 01/31/2023 04:09 PM    Glucose (POC) 309 (H) 01/31/2023 11:44 AM     Lab Results   Component Value Date/Time    Color YELLOW/STRAW 01/28/2023 12:11 PM    Appearance HAZY (A) 01/28/2023 12:11 PM    Specific gravity 1.010 01/28/2023 12:11 PM    Specific gravity 1.013 01/01/2023 09:13 AM    pH (UA) 5.5 01/28/2023 12:11 PM    Protein TRACE (A) 01/28/2023 12:11 PM    Glucose 100 (A) 01/28/2023 12:11 PM    Ketone Negative 01/28/2023 12:11 PM    Bilirubin Negative 01/28/2023 12:11 PM    Urobilinogen 1.0 01/28/2023 12:11 PM    Nitrites Negative 01/28/2023 12:11 PM    Leukocyte Esterase MODERATE (A) 01/28/2023 12:11 PM    Epithelial cells FEW 01/28/2023 12:11 PM    Bacteria Negative 01/28/2023 12:11 PM    WBC 5-10 01/28/2023 12:11 PM    RBC  01/28/2023 12:11 PM       Notes reviewed from all clinical/nonclinical/nursing services involved in patient's clinical care. Care coordination discussions were held with appropriate clinical/nonclinical/ nursing providers based on care coordination needs. Patients current active Medications were reviewed, considered, added and adjusted based on the clinical condition today. Home Medications were reconciled to the best of my ability given all available resources at the time of admission. Route is PO if not otherwise noted.       Medications Reviewed:     Current Facility-Administered Medications   Medication Dose Route Frequency    traZODone (DESYREL) tablet 50 mg  50 mg Oral QHS PRN    insulin lispro (HUMALOG) injection 4 Units  4 Units SubCUTAneous TID WITH MEALS    insulin glargine (LANTUS) injection 10 Units  10 Units SubCUTAneous DAILY    apixaban (ELIQUIS) tablet 10 mg  10 mg Oral BID    Followed by    Suellyn Frankel ON 2/6/2023] apixaban (ELIQUIS) tablet 5 mg  5 mg Oral BID    [Held by provider] potassium chloride SR (KLOR-CON 10) tablet 20 mEq  20 mEq Oral BID    doxycycline (VIBRAMYCIN) 100 mg in 0.9% sodium chloride (MBP/ADV) 100 mL MBP  100 mg IntraVENous Q12H    glucose chewable tablet 16 g  4 Tablet Oral PRN    glucagon (GLUCAGEN) injection 1 mg  1 mg IntraMUSCular PRN    dextrose 10 % infusion 0-250 mL  0-250 mL IntraVENous PRN    insulin lispro (HUMALOG) injection   SubCUTAneous AC&HS    balsam peru-castor oiL (VENELEX) ointment   Topical BID    metoprolol succinate (TOPROL-XL) XL tablet 12.5 mg  12.5 mg Oral Q12H    lidocaine 4 % patch 1 Patch  1 Patch TransDERmal Q24H    amiodarone (CORDARONE) tablet 400 mg  400 mg Oral BID    aspirin delayed-release tablet 81 mg  81 mg Oral DAILY    sodium chloride (NS) flush 5-40 mL  5-40 mL IntraVENous Q8H    sodium chloride (NS) flush 5-40 mL  5-40 mL IntraVENous PRN    acetaminophen (TYLENOL) tablet 650 mg  650 mg Oral Q6H PRN    Or    acetaminophen (TYLENOL) suppository 650 mg  650 mg Rectal Q6H PRN    polyethylene glycol (MIRALAX) packet 17 g  17 g Oral DAILY PRN    ondansetron (ZOFRAN ODT) tablet 4 mg  4 mg Oral Q8H PRN    Or    ondansetron (ZOFRAN) injection 4 mg  4 mg IntraVENous Q6H PRN    [Held by provider] furosemide (LASIX) injection 40 mg  40 mg IntraVENous BID    heparin (porcine) pf 500 Units  500 Units InterCATHeter PRN    pantoprazole (PROTONIX) tablet 40 mg  40 mg Oral ACB    rosuvastatin (CRESTOR) tablet 20 mg  20 mg Oral QHS    milrinone (PRIMACOR) 20 MG/100 ML D5W infusion  0.2 mcg/kg/min IntraVENous CONTINUOUS    heparin (porcine) pf 500 Units  500 Units InterCATHeter DAILY     ______________________________________________________________________  EXPECTED LENGTH OF STAY: 4d 2h  ACTUAL LENGTH OF STAY:          Vielka Villegas MD

## 2023-02-01 NOTE — PROGRESS NOTES
Physician Progress Note      [facility]                  [id number]  :                       1950  ADMIT DATE:       2023 4:17 PM  [address] [place] DATE:  RESPONDING  PROVIDER #:        Colby Borjas MD          QUERY TEXT:    Patient admitted with NSTEMI. Per wound care consult , noted to also have pressure ulcer. If possible, please document in progress notes and discharge summary the location, present on admission status and stage of the pressure ulcer: The medical record reflects the following:  Risk Factors: acute illness  Clinical Indicators:   wound care  1. POA right heel deep tissue injury  3 x 3 x 0 cm  100% non-blanching purple; no blistering or skin sliding or induration or fluctuance    Treatment: wound care consult    Thank you,  Dejuan Mathews RN, CDI, Katherine, CCDS  Certified Clinical Documentation   [phone]  You can also contact me via [address] [place] [address]. Stage 1:  Non-blanchable erythema of intact skin  Stage 2:  Abrasion, Blister, Partial-thickness skin loss, with exposed dermis  Stage 3:  Full-thickness skin loss with damage or necrosis of subcutaneous tissue  Stage 4:  Full-thickness skin & soft tissue loss through to underlying muscle, tendon or bone  Unstageable: Obscured full-thickness skin & tissue loss  Options provided:  -- Deep tissue injury pressure ulcer of right heel present on admission  -- Other - I will add my own diagnosis  -- Disagree - Not applicable / Not valid  -- Disagree - Clinically unable to determine / Unknown  -- Refer to Clinical Documentation Reviewer    PROVIDER RESPONSE TEXT:    This patient has a deep tissue injury pressure ulcer of the right heel which was present on admission.     Query created by: Logan Lund on 2023 4:01 PM      Electronically signed by:  Colby Borjas MD 2023 4:40 PM

## 2023-02-01 NOTE — DIABETES MGMT
[facility]  DIABETES MANAGEMENT CONSULT    Consulted by  Sudeep Ryan MD   for advanced nursing evaluation and care for inpatient blood glucose management. Evaluation and Action Plan   Moses Benson is a 71year old gentleman, with Type 2 Diabetes with a recent A1C of 7.7%, who is admitted with arrhythmias and acute on chronic HFrEF with failure to respond to inotrope support. He was discharged one week prior from a prolonged hospital stay for acute on chronic HF and LVAD work-up and discharged home on dobutamine with a lifevest.  Glucose control was tenuous during his previous admission s/t multiple co-morbidities, several tests/procedures requiring NPO status, waxing and waining oral intake. At this time glucose is impacted by:  Heart Failure with reduced EF 25-30%  Milrinone therapy  TANNER: GFR 33  Infection: UTI and PNA, IV antibiotics   Oral intake     Last admission, he required low basal doses but high bolus doses with meals to offset pre-prandial hyperglycemia. He is experiencing pre-prandial hyperglycemia now and moderate dose bolus humalog resumed. BG pattern has improved and will continue current therapy. Individualized BG target is closer to 180mg/dl. Management Rationale Action Plan   Medication   Basal needs Using 0.2 units/kg/D Lantus 10 units daily   Nutritional needs Using resistant sensitivity based on degree of pre-prandial hyperglycemia 4 units Humalog/meal    Hold if patient is NPO or consumes less than 50% of carbohydrates on meal tray    Will increase by 2 units for persistent pre-prandial hyperglycemia   Corrective insulin Using normal sensitivity  Normal sensitivity ACHS    Will reduce to high sensitivity if GFR below 20   Referral [x] Inpatient nutrition services   Additional orders  Carb consistent diet. Additional recommendations per RD: they were very helpful last admission with malnutrition, poor appetite, and hyperglycemia. Initial Presentation   Peterson Buck is a 71 y.o. male who presented to the ED 1/26/23 with lower extremity swelling and difficulty breathing. He had a prolonged hospital admission from 12/22/22-1/19/23 for acute on chronic systolic HF and LVAD work-up. He was discharged with home inotrope. LAB: , GFR 58, Trop 2003, BNP 4524  CXR: New diffuse bilateral increased interstitial markings, partially obscuring bilateral pulmonary nodules. HX:   Past Medical History:   Diagnosis Date    CAD (coronary artery disease) 11/10/2016    NSTEMI & 2 stents    Deafness 10/28/2012    DM (diabetes mellitus) ([facility] [address]     Elevated cholesterol     Hypertension     NSTEMI (non-ST elevated myocardial infarction) ([facility] [address] 11/10/2016        INITIAL DX:   CHF (congestive heart failure) ([facility] [address] [I50.9]     Current Treatment     TX: Milrinone, Amio. Specialty consultations: [facility], Cardiology, EP, GI     Hospital Course   Clinical progress has been complicated by:    5/09: Admission. Rapid response for SVT- Given adenosine x2, amio bolus/gtt  1/27: Advanced HFC consult. EP consult ordered. Intolerance of inotropic support. Cardiology consult. NSTEMI, heparin gtt started. Seen by EP: Continue amio. 1/28: Febrile: CT chest 1/28 shows diffuse focal opacities concerning for pneumonia. Obtain blood cultures, UA. Pathology report 1/18/23: well differentiated neuroendocrine tumor grade 1. EGD: Patchy erythema gastric body, biopsies done. 6 mm sessile polyp gastric body biopsied  1/30: Oncology consult: Recommend multiphase CT of the abdomen and pelvis to evaluate for metastatic spread. Tachycardia and SOB, BiPAP overnight.    Diabetes History   Type 2 Diabetes  Ambulatory BG management provided by: PCP Griffin Saab MD    Diabetes-related Medical History  Acute complications  Acute hyperglycemia  Neurological complications  Peripheral neuropathy  Microvascular disease  Nephropathy  Macrovascular disease  CAD  Other associated conditions                                       CHF     Diabetes Medication History  Key Antihyperglycemic Medications        Diabetes Medication History  Key Antihyperglycemic Medications               metFORMIN (GLUCOPHAGE) 500 mg tablet (Taking) Take 1 Tablet by mouth two (2) times daily (with meals) for 30 days. glipiZIDE (GLUCOTROL) 5 mg tablet (Taking) Take 1 tablet by mouth twice daily    Januvia 50 mg tablet (Taking) Take 1 tablet by mouth once daily             Diabetes self-management practices:   Eating pattern                Eats 3 small meals daily  [x]         Breakfast                         2 Eggs, Coffee  [x]         Lunch                               [place]  [x]         Dinner                              \"Lao Food\" Bread, rice, lentils   [x]         Bedtime                           [place]  [x]         Snacks                              Afternoon snack  [x]         Beverages                        Water, Coffee  Physical activity pattern                Sedentary   Monitoring pattern  Discharged last week with a Carolynn CGM and serum glucometer  7 day average Ba-9a: 129-164  9a-12: 243  Noon to 9p: 198-238  1 low BG in the last week overnight    Taking medications pattern  [x]         Consistent administration  [x]         Affordable  Social determinants of health impacting diabetes self-management practices   Concerned that you need to know more about how to stay healthy with diabetes  Overall evaluation:                 [x]         Achieving A1c target with drug therapy & self-care practices    Subjective        Objective   Physical exam  General Underweight [place] ([facility]) male in acute distress/ill-appearing. Neuro  Alert, oriented   Vital Signs Visit Vitals  BP (!) 105/40   Pulse 89   Temp 98.4 °F (36.9 °C)   Resp 24   Ht 5' 9\" (1.753 m)   Wt 56 kg (123 lb 7.3 oz)   SpO2 94%   BMI 18.23 kg/m²     Skin  Warm and dry. No acanthosis noted along neckline.    Heart   Regular rate and rhythm.  No murmurs, rubs or gallops  Lungs  Clear to auscultation without rales or rhonchi  Extremities No foot wounds        Laboratory  Recent Labs     23  0136 23  0323 23  0353   GLU 94 221* 261*   AGAP 7 7 7   WBC 6.3 7.6 8.0   CREA 2.08* 2.09* 1.88*   AST  --  14*  --    ALT  --  16  --          Factors impacting BG management  Factor Dose Comments   Nutrition:  Standard meals     60 grams/meal      Heart Failure/NSTEMI/Ischemic cardiomyopathy/Arrhythmias  Milrinone  BNP 5174  EF 25-30%    Infection UTI/PNA  Urine Cx pending   IV antibiotics   FEvers    Other:   Kidney function TANNER on CKD:   Current GFR 33      Blood glucose pattern    Significant diabetes-related events over the past 24-72 hours  UA on admission: 100 glucose, yeast, moderate leuk esterase  A1C 7.7% 23  Fasting B (94 on 1am labs)  Pre-prandial: 130-7  Basal: Lantus 10 units  Bolus: 4 units Humalog/meal  Correction: 8 units in the last 24h  Eating ok- drinking supplements       Assessment and Nursing Intervention   Nursing Diagnosis Risk for unstable blood glucose pattern   Nursing Intervention Domain 5250 Decision-making Support   Nursing Interventions Examined current inpatient diabetes/blood glucose control   Explored factors facilitating and impeding inpatient management  Explored corrective strategies with patient and responsible inpatient provider   Informed patient of rational for insulin strategy while hospitalized     Nursing Diagnosis 39968 Ineffective Health Management   Nursing Intervention Domain 5250 Decision-making Support   Nursing Interventions Identified diabetes self-management practices impeding diabetes control  Discussed diabetes survival skills related to  Healthy Plate eating plan; given handouts  Role of physical activity in improving insulin sensitivity and action  Procedure for blood glucose monitoring & options for low-cost products  Medications plan at discharge     Billing Code(s) No charge    Before making these care recommendations, I personally reviewed the hospitalization record, including notes, laboratory & diagnostic data and current medications, and examined the patient at the bedside (circumstances permitting) before determining care. More than fifty (50) percent of the time was spent in patient counseling and/or care coordination.   Total minutes: 10    Shante Bolden, CNS  Diabetes Clinical Nurse Specialist  Program for Diabetes Health  Access via [employer]

## 2023-02-01 NOTE — PROGRESS NOTES
[address] [facility] at [address]  W: [phone]  F: [phone]    Reason for Evaluation:   Sammie Schmitz is a 71 y.o. male with CAD s/p CABG, PVD, DM, HTN, and new HFrEF (EF 25%) 2/2 ICM and NICM who is seen in consultation at the request of Dr. Jazmyne Barksdale for evaluation of well-differentiated neuroendocrine tumor. Patient is being considered for LVAD placement. Hematology/Oncology Treatment History:   1/18/23 EGD: Path of stomach biopsy with well-differentiated neuroendocrine tumor, G1; chronic active gastritis with intestinal metaplasia; no dysplasia. Stomach polyp with well-differentiated neuroendocrine tumor, G1; chronic active gastritis with intestinal metaplasia, and reactive hyperplasia; no dysplasia. Ki67 low (2%). History of Present Illness:   Samime Schmitz is a pleasant 71 y.o. male who presents today for evaluation of well-differentiated neuroendocrine tumor. Patient was initially hospitalized 12/5/22 for cardiogenic shock with newly decreased EF ~25% by echocardiogram.  He has had 3 additional hospitalizations since then for acute heart failure exacerbations. He is being considered for LVAD by advanced heart failure team.  As part of LVAD work-up, he underwent endoscopy/colonoscopy on 1/18/23. EGD was notable for patchy erythema in the gastric body and 6 mm sessile gastric body polyp. Pathology of both areas demonstrated well-differentiated neuroendocrine tumor, G1, Ki67 2%. There was also chronic active gastritis with intestinal metaplasia but no dysplasia. He was re-admitted 1/26/23 for acute heart failure exacerbation. Patient is pending LVAD evaluation and will remain in-house until decision made. Of note, he had endoscopy 6/28/2018 for anemia. Biopsy of stomach at that time also showed \"atrophic gastritis with neuroendocrine hyperplasia. Intestinal metaplasia with no e/o dysplasia. \"    Other work-up notable for  - 1/29/23 CT Chest w/o contrast: patchy upper and lower lobe lung infiltrates, suspicious for pneumonia, with superimposed pulmonary edema/effusions. - 12/22/22 CT A/P w/o contrast: bladder distention/hydronephrosis. No abdominal masses. Interval history:  Gastrin level eleveated at 783   B12 wnl  Pateint reports SOB. Creatinine slightly up to 2.09. Patient's family at bedside. Physical Exam:   Visit Vitals  BP (!) 95/38 (BP 1 Location: Left upper arm, BP Patient Position: Sitting)   Pulse 97   Temp 98.7 °F (37.1 °C)   Resp (!) 31   Ht 5' 9\" (1.753 m)   Wt 123 lb 7.3 oz (56 kg)   SpO2 96%   BMI 18.23 kg/m²     ECOG PS: 3-4  General: no distress,  Eyes: anicteric sclerae  HENT: oropharynx clear  Neck: supple  Respiratory: normal respiratory effort  CV: no peripheral edema, on inotropes   Ext: warm, well perfused, no edema  GI: soft, nontender, nondistended, no masses  Skin: no rashes, no ecchymoses, no petechiae  Neuro: grossly intact    Results:     Lab Results   Component Value Date/Time    WBC 6.3 02/01/2023 01:36 AM    HGB 8.0 (L) 02/01/2023 01:36 AM    HCT 27.7 (L) 02/01/2023 01:36 AM    PLATELET 487 38/02/3617 01:36 AM    MCV 84.7 02/01/2023 01:36 AM    ABS.  NEUTROPHILS 4.0 02/01/2023 01:36 AM     Lab Results   Component Value Date/Time    Sodium 136 02/01/2023 01:36 AM    Potassium 4.9 02/01/2023 01:36 AM    Chloride 106 02/01/2023 01:36 AM    CO2 23 02/01/2023 01:36 AM    Glucose 94 02/01/2023 01:36 AM    BUN 33 (H) 02/01/2023 01:36 AM    Creatinine 2.08 (H) 02/01/2023 01:36 AM    GFR est AA 46 (L) 06/23/2022 09:40 AM    GFR est non-AA 38 (L) 06/23/2022 09:40 AM    Calcium 9.0 02/01/2023 01:36 AM    Sodium,  (L) 12/11/2022 11:07 PM    Potassium, POC 6.2 (HH) 12/11/2022 11:07 PM    Chloride,  (H) 12/11/2022 11:07 PM    Glucose (POC) 341 (H) 02/01/2023 04:04 PM    Creatinine, POC 1.4 (H) 12/11/2022 11:07 PM    Calcium, ionized (POC) 1.22 12/11/2022 11:07 PM     Lab Results   Component Value Date/Time    Bilirubin, total 0.5 01/31/2023 03:23 AM    ALT (SGPT) 16 01/31/2023 03:23 AM    Alk. phosphatase 102 01/31/2023 03:23 AM    Protein, total 6.9 01/31/2023 03:23 AM    Albumin 3.1 (L) 01/31/2023 03:23 AM    Globulin 3.8 01/31/2023 03:23 AM     Records from [facility] reviewed and summarized above. Test results above have been reviewed. Assessment:     #) Gastric well-differentiated neuroendocrine tumor, G1:   Incidentally noted on EGD from 1/18/23 as part of pre-LVAD workup. EGD with area of patchy erythema in gastric body and 6 mm gastric body polyp. Notably, this has been present since 6/2018 when he had EGD with biopsy of stomach that showed \"Atrophic gastritis with neuroendocrine hyperplasia. \"  This provides further support that this has been extremely indolent/slow-growing over the last five years. Non-contrast cross sectional imaging does not show any evidence of metastatic disease. Ideally, we would obtain a dotatate PET-CT to evaluate for metastatic spread; however this can only be obtained as outpatient and patient will remain in-house until decision about his LVAD. As an alternative, would recommend multiphase CT of the abdomen and pelvis to evaluate for metastatic spread. This is currently on hold. Gastrin level returned elevated. 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 These can frequently be treated with endoscopic resection alone. Our patient has low Ki67, grade 1, and well differentiated, which are all good prognostic features, and his tumor has likely been very slow growing over the last 5 years (arising from area of neuro-endocrine hyperplasia seen in 6/2018). It is worth noting that asymptomatic metastatic NETs with low tumor burden are frequently treated with observation alone versus somatostatin analogues (e.g. ocretotide).  Somatostatin analogues do not carry the same toxicty profile as chemotherapy and are generally very well tolerated, although can cause injection site pain, fatigue, diarrhea, arthralgias, abdominal discomfort, biliary tract disease (gallstones with long-term use), among other side effects. Even in the event that this represented a metastatic NET, the 5-year overall survival for all gastric NET is ~60% and the cancer-specific survival approaches ~75-80%. Given its well-differentiated pathology with low grade/Ki67, this number is likely even higher (if excluding his other co-morbidities). Thus, based on the currently available information, even without staging imaging, I believe the patient's 2-year survival from his NET to be >80%  and thus this should preclude him from being considered for VAD. Plan:     Continue Protonix daily   Case discussed with primary medicine team  Patient's 2-year survival from NET alone is expected to be >80%. From oncologic perspective, this should not preclude him from being considered for VAD. Defer ultimate decision to AHF/VAD team  If patient pursues VAD and further therapy, then would proceed with additional staging (e.g. multiphase CT in house or dotate scan as outpatient)  Patient and family updated in the room and via phone. Oncology will follow along peripherally. Please reach out if further questions.     Signed By: Tree Radnall MD

## 2023-02-01 NOTE — PROGRESS NOTES
Problem: Mobility Impaired (Adult and Pediatric)  Goal: *Acute Goals and Plan of Care (Insert Text)  Description: FUNCTIONAL STATUS PRIOR TO ADMISSION: Patient was modified independent using a rollator for functional mobility. Pt recently d/c home after previous hospital stay. Able to ambulate community distances. HOME SUPPORT PRIOR TO ADMISSION: The patient lived with spouse but did not require assist.    Physical Therapy Goals  Initiated 1/28/2023  1. Patient will move from supine to sit and sit to supine  in bed with modified independence within 7 day(s). 2.  Patient will transfer from bed to chair and chair to bed with modified independence using the least restrictive device within 7 day(s). 3.  Patient will perform sit to stand with modified independence within 7 day(s). 4.  Patient will ambulate with modified independence for 300 feet with the least restrictive device within 7 day(s). 5.  Patient will ascend/descend 12 stairs with 1 handrail(s) with supervision/set-up within 7 day(s). Outcome: Progressing Towards Goal   PHYSICAL THERAPY TREATMENT  Patient: Mary Alfonso (76 y.o. male)  Date: 2/1/2023  Diagnosis: [facility]congest[facility]) [facility] [I50.9] <principal problem not specified>      Precautions: Fall  Chart, physical therapy assessment, plan of care and goals were reviewed. ASSESSMENT  Patient continues with skilled PT services and is progressing towards goals. Patient found supine in bed with spo2 98% on RA and agreeable to PT. Patient mod I for supine > sit with use of bed rails and SBA for sit <> stand transfers and ambulation up to 150 feet with rollator. Patient's BP very soft, which is patient's usual, and per usual patient asymptomatic throughout standing activity. While ambulating reviewed safety procedures with use of rollator for sitting, I.e. pushing against stable surface and locking brakes prior to initiating a sit. Spo2 stable on RA at 92%-95% while ambulating. Patient returned to room and left seated in chair with all needs in reach and supportive family present. Current Level of Function Impacting Discharge (mobility/balance): ambulates 150 feet with SBA and rollator on RA     Other factors to consider for discharge: pending oncology workup, pending LVAD workup          PLAN :  Patient continues to benefit from skilled intervention to address the above impairments. Continue treatment per established plan of care. to address goals. Recommendation for discharge: (in order for the patient to meet his/her long term goals)  Physical therapy at least 2 days/week in the home     This discharge recommendation:  A follow-up discussion with the attending provider and/or case management is planned    IF patient discharges home will need the following DME: patient owns DME required for discharge       SUBJECTIVE:   Patient stated Was my oxygen good? Jane Maynard    OBJECTIVE DATA SUMMARY:   Critical Behavior:  Neurologic State: Alert  Orientation Level: Oriented X4  Cognition: Appropriate decision making, Appropriate for age attention/concentration, Follows commands  Safety/Judgement: Awareness of environment, Insight into deficits  Functional Mobility Training:  Bed Mobility:  Rolling: Modified independent  Supine to Sit: Modified independent     Scooting: Independent        Transfers:  Sit to Stand: Stand-by assistance  Stand to Sit: Stand-by assistance        Bed to Chair: Stand-by assistance                    Balance:  Sitting: Intact; Without support  Sitting - Static: Good (unsupported)  Sitting - Dynamic: Good (unsupported)  Standing: Intact; With support  Standing - Static: Good;Constant support  Standing - Dynamic : Good;Constant support  Ambulation/Gait Training:  Distance (ft): 150 Feet (ft)  Assistive Device: Gait belt;Walker, rollator  Ambulation - Level of Assistance: Stand-by assistance        Gait Abnormalities: Decreased step clearance              Speed/Bette: [employer] decreased (<100 feet/min)  Step Length: Right shortened;Left shortened                    Pain Ratin/10    Activity Tolerance:   Good, SpO2 stable on RA, and requires rest breaks    After treatment patient left in no apparent distress:   Sitting in chair, Call bell within reach, and Caregiver / family present    COMMUNICATION/COLLABORATION:   The patients plan of care was discussed with: Occupational therapist and Registered nurse.      Carli Power, PT   Time Calculation: 17 mins

## 2023-02-01 NOTE — PROGRESS NOTES
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 During LVAD eval, found to have well-differentiated neuroendocrine tumor on gastric body and gastric polyp, G1; with GI, Oncology and Palliative following. Per Oncology- this may be a very treatable tumor with good prognosis and thus would not preclude LVAD. No absolute contraindications to LVAD at this point, as we await further diagnostics for Oncology to better prognosticate- Preferable to have CT w/contrast, but cannot proceed with contrast imaging until renal function improved  Will continue to move forward and complete remainder of LVAD workup while inpatient and discuss at [facility]. Discussed today with pt, Hospitalist, and nephrology.           RECOMMENDATIONS:  Continue milrinone  0.2mcg/kg/min unable to uptitrate due to HR and BP  Continue toprol XL 12.5mg BID  Cannot tolerate ARB/ARNi due to hypotension  Of note Flomax was held and BP responded well   Cannot tolerate spironolactone due to hyperkalemia  Cannot tolerate jardiance due to significant diuresis on smallest dose/contributed to IVVD  Discontinued corlanor due to SVT  Digoxin stopped d/t risk for toxicity with amio loading; monitoring level  Continue amiodarone, appreciate EP cardiology recs  Hold lasix and potassium; montior renal function  Appreciate nephrology consult  Keep K+ >4 and Mg >2  Abx/treatment of suspected PNA per hospitalist  Continue lifevest  Continue baby aspirin   Plavix previously stopped, okayed by Dr. Omer Daugherty consult pending (high risk for TRISTAN)  VAD evaluation in progress, will need PFT/DLCO, maxillary CT with contrast, urology consult, sleep medicine consult  Pending further diagnostics for Oncology prognosis  Re-ordering CT head/maxillofacial to continue LVAD eval  CAV cardiology consulted; appreciate their input     All other care per primary team, hopefully home end of the week      INTERVAL HISTORY:  NAEO  Urine cx neg  Blood cx NGTD  Cr holding around 2  Net -250mL last 24 hrs  Pro BNP stable  Pt ambulating around unit, poor appetite, no acute complaints       IMPRESSION:  Acute on chronic combined systolic/diastolic  heart failure  Stage D, NYHA class IIIB/IV symptoms   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, LVEF 25-30% and 23% (by cMRI 1/3/23)  Cardiac MRI suggestive of ischemic cardiomyopathy  PYP equivocal  Chronic milrinone infusion as palliation   Coronary artery disease  CAD s/p CABG x 2: further disease best managed medically due to small vessel size   At risk of sudden cardiac death  Peripheral arterial disease  Bilateral hydronephrosis s/p donnelly  Cardiac risk factors:  HTN  HL  TRISTAN, STOP-BANG 4  DM2  CKD, stage 3  MOCA from 1/4/22 17/30, consistent with mild cognitive impairment  Hard of hearing  Gastric Neuroendocrine Tumor  Sepsis, unclear source         LIFE GOALS:  Lifestyle goals reviewed with the patient.   Patient's personal goals include: TBD  Important upcoming milestones or family events: TBD  The patient identifies the following as important for living well: TBD                CARDIAC IMAGING:  Echo 1/9/23   Left Ventricle: Severely reduced left ventricular systolic function with a visually estimated EF of 25 - 30%. Left ventricle size is normal. Mildly increased wall thickness. Echo 12/26/22    Left Ventricle: Severely reduced left ventricular systolic function with a visually estimated EF of 25 - 30%. Left ventricle size is normal. Normal wall thickness. There are regional wall motion abnormalities. Grade II diastolic dysfunction with increased LAP. Right Ventricle: Moderately reduced systolic function. TAPSE is abnormal. TAPSE is 1.1 cm. Aortic Valve: Mild stenosis of the aortic valve. AV peak gradient is 13 mmHg. AV peak velocity is 1.8 m/s. Mitral Valve: Not well visualized. Moderate annular calcification at the posterior leaflet of the mitral valve. Mild to moderate regurgitation. Tricuspid Valve: Mildly elevated RVSP. Left Atrium: Left atrium is moderately dilated. 12/8/22    Left Ventricle: Moderately reduced left ventricular systolic function with a visually estimated EF of 35 - 40%. Severe hypokinesis of the following segments: mid anteroseptal, apical anterior, apical septal, apical inferior and apical lateral. Severe hypokinesis of the apex. Mitral Valve: Severely thickened leaflet, at the anterior and posterior leaflets. Severely calcified leaflet, at the anterior and posterior leaflets. Mild annular calcification of the mitral valve. Moderate regurgitation. Left Atrium: Left atrium is mildly dilated. Contrast used: Definity. limited study     EKG 12/22/22 ST, Biatria enlargement, marked ST abnormality     C 12/6/22  1. Normal LVEDP  2. Severe native multivessel coronary artery disease  3. Patent LIMA to LAD and vein graft to distal RCA  4. Recurrent ISR in OM1 stent with now 60 to 70% restenosis  5.   Recoil of left main and circumflex stent with now recurrent 40 to 50% stenosis. 6.  Progression of ostial left main disease now to about 60% stenosis  7. Progression of disease in jailed first marginal branch now with diffuse 90% stenosis  8. High-grade stenosis in the mid to distal right potential femoral artery treated with 6 x 40 mm impact drug-coated balloon angioplasty to reduce the stenosis to less than 40%     NST        HEMODYNAMICS:  RHC 1/9/23  PA 20/9, RA 3, PCWP 8, CI 1.8     CPEST too ill   6MW 300 feet       HPI:  71 y.o. male who was admitted via ED for worsening SOB, poor appetite, orthopnea and fatigue. Pmhx of CAD s/p CABG, PAD, DM 2, recent admission for HFmrEF earlier this month. Serial TTEs show progressive decline in EF since 3/2021 with the most recent EF at 25-30%. Recent LHC shows diffuse CAD and no interventions indicated. Patient had Maia [place] recently and there is some thought to myocarditis or other etiology causing worsening HF. [facility] was consulted for further evaluation and management of HFrEF. REVIEW OF SYSTEMS:  Review of Systems   Constitutional:  Positive for malaise/fatigue. Negative for chills and fever. Respiratory:  Positive for shortness of breath. Negative for cough. Cardiovascular:  Negative for chest pain, palpitations, orthopnea and leg swelling. Gastrointestinal:  Negative for abdominal pain, heartburn, nausea and vomiting. Genitourinary:         Poor appetite   Neurological:  Positive for weakness. Negative for dizziness. PHYSICAL EXAM:  Visit Vitals  BP (!) 105/40   Pulse 89   Temp 98.4 °F (36.9 °C)   Resp 24   Ht 5' 9\" (1.753 m)   Wt 123 lb 7.3 oz (56 kg)   SpO2 94%   BMI 18.23 kg/m²     Physical Exam  Vitals and nursing note reviewed. Constitutional:       General: He is not in acute distress. Appearance: Normal appearance. He is ill-appearing. Cardiovascular:      Rate and Rhythm: Normal rate and regular rhythm. Heart sounds: Normal heart sounds. No murmur heard. No friction rub.  No gallop. Pulmonary:      Effort: Pulmonary effort is normal. No respiratory distress. Breath sounds: Normal breath sounds. Abdominal:      General: Bowel sounds are normal. There is no distension. Palpations: Abdomen is soft. Tenderness: There is no abdominal tenderness. Musculoskeletal:      Right lower le+ Pitting Edema present. Left lower le+ Pitting Edema present. Skin:     General: Skin is warm and dry. Capillary Refill: Capillary refill takes less than 2 seconds. Neurological:      General: No focal deficit present. Mental Status: He is alert and oriented to person, place, and time. Psychiatric:         Mood and Affect: Mood normal.         Behavior: Behavior normal.         Thought Content:  Thought content normal.         Judgment: Judgment normal.            PAST MEDICAL HISTORY:  Past Medical History:   Diagnosis Date    CAD (coronary artery disease) 11/10/2016    NSTEMI & 2 stents    Deafness 10/28/2012    DM (diabetes mellitus) ([facility])     Elevated cholesterol     Hypertension     NSTEMI (non-ST elevated myocardial infarction) ([facility] [address] 11/10/2016       PAST SURGICAL HISTORY:  Past Surgical History:   Procedure Laterality Date    COLONOSCOPY N/A 2018    COLONOSCOPY performed by Lori Quiñonez MD at [facility] ENDOSCOPY    COLONOSCOPY N/A 2023    COLONOSCOPY performed by Marbella Madrigal MD at [facility] ENDOSCOPY    [address]  2016    2 stents       FAMILY HISTORY:  Family History   Problem Relation Age of Onset    Heart Disease Father     Heart Attack Father     Hypertension Mother     Elevated Lipids Brother     Elevated Lipids Brother     No Known Problems Sister     Elevated Lipids Brother     No Known Problems Son     No Known Problems Daughter     Anesth Problems Neg Hx        SOCIAL HISTORY:  Social History     Socioeconomic History    Marital status:    Tobacco Use    Smoking status: Never     Passive exposure: Never    Smokeless tobacco: Never   Vaping Use    Vaping Use: Never used   Substance and Sexual Activity    Alcohol use: Yes     Alcohol/week: 2.0 standard drinks     Types: 1 Cans of beer, 1 Shots of liquor per week     Comment: rarely    Drug use: No    Sexual activity: Yes     Social Determinants of Health     Financial Resource Strain: Medium Risk    Difficulty of Paying Living Expenses: Somewhat hard   Food Insecurity: Food Insecurity Present    Worried About Running Out of Food in the Last Year: Never true    Ran Out of Food in the Last Year: Often true       LABORATORY RESULTS:     Labs Latest Ref Rng & Units 2/1/2023 1/31/2023 1/30/2023 1/29/2023 1/28/2023 1/28/2023 1/27/2023   WBC 4.1 - 11.1 K/uL 6.3 7.6 8.0 8.8 - 6.3 6.7   RBC 4.10 - 5.70 M/uL 3.27(L) 3.66(L) 3.48(L) 3.52(L) - 3.24(L) 3.59(L)   Hemoglobin 12.1 - 17.0 g/dL 8. 0(L) 9.1(L) 8.5(L) 8.7(L) - 8. 0(L) 9.2(L)   Hematocrit 36.6 - 50.3 % 27. 7(L) 30. 6(L) 29. 5(L) 29. 3(L) - 26. 5(L) 29. 9(L)   MCV 80.0 - 99.0 FL 84.7 83.6 84.8 83.2 - 81.8 83.3   Platelets 140 - 782 K/uL 271 307 297 270 - 217 229   Lymphocytes 12 - 49 % 21 20 23 33 - 27 -   Monocytes 5 - 13 % 10 9 10 10 - 11 -   Eosinophils 0 - 7 % 3 3 1 3 - 3 -   Basophils 0 - 1 % 1 1 1 1 - 1 -   Albumin 3.5 - 5.0 g/dL - 3. 1(L) - - - - 3. 0(L)   Calcium 8.5 - 10.1 MG/DL 9.0 9.1 9.0 8.8 8.4(L) 8.7 8.9   Glucose 65 - 100 mg/dL 94 221(H) 261(H) 280(H) 380(H) 289(H) 237(H)   BUN 6 - 20 MG/DL 33(H) 34(H) 27(H) 24(H) 23(H) 17 16   Creatinine 0.70 - 1.30 MG/DL 2.08(H) 2.09(H) 1.88(H) 1.78(H) 1.58(H) 1.48(H) 1.15   Sodium 136 - 145 mmol/L 136 131(L) 131(L) 132(L) 132(L) 132(L) 137   Potassium 3.5 - 5.1 mmol/L 4.9 5.4(H) 4.8 4.4 4.3 3.3(L) 3.9   TSH 0.36 - 3.74 uIU/mL - - - - - 3.52 -   PSA 0.01 - 4.0 ng/mL - - - - - - -   LDH 85 - 241 U/L - - - - - - -   Some recent data might be hidden     Lab Results   Component Value Date/Time    TSH 3.52 01/28/2023 05:26 AM    TSH 2.12 12/27/2022 02:36 PM    TSH 4.80 (H) 12/06/2022 03:53 AM    TSH 5.39 (H) 10/12/2022 09:10 AM    TSH 3.53 02/03/2022 11:47 AM    TSH 5.790 (H) 11/21/2019 04:45 PM    TSH 3.08 06/22/2018 01:53 PM    TSH 4.250 05/26/2015 09:43 AM       ALLERGY:  No Known Allergies     CURRENT MEDICATIONS:    Current Facility-Administered Medications:     traZODone (DESYREL) tablet 50 mg, 50 mg, Oral, QHS PRN, Debby Diamond MD, 50 mg at 01/31/23 2035    insulin lispro (HUMALOG) injection 4 Units, 4 Units, SubCUTAneous, TID WITH MEALS, Cathy Sheldon CNS, 4 Units at 02/01/23 0904    insulin glargine (LANTUS) injection 10 Units, 10 Units, SubCUTAneous, DAILY, Debby Diamond MD, 10 Units at 02/01/23 3075    apixaban (ELIQUIS) tablet 10 mg, 10 mg, Oral, BID, 10 mg at 02/01/23 0901 **FOLLOWED BY** [START ON 2/6/2023] apixaban (ELIQUIS) tablet 5 mg, 5 mg, Oral, BID, Debby Diamond MD    [Held by provider] potassium chloride SR (KLOR-CON 10) tablet 20 mEq, 20 mEq, Oral, BID, Giovanna STALEY NP, 20 mEq at 01/30/23 1030    cefTRIAXone (ROCEPHIN) 1 g in 0.9% sodium chloride 10 mL IV syringe, 1 g, IntraVENous, Q24H, Debby Diamond MD, 1 g at 01/31/23 1245    doxycycline (VIBRAMYCIN) 100 mg in 0.9% sodium chloride (MBP/ADV) 100 mL MBP, 100 mg, IntraVENous, Q12H, Kathy Brown MD, Last Rate: 100 mL/hr at 02/01/23 0128, 100 mg at 02/01/23 0128    glucose chewable tablet 16 g, 4 Tablet, Oral, PRN, Ita Harvey NP    glucagon (GLUCAGEN) injection 1 mg, 1 mg, IntraMUSCular, PRN, Ita Harvey NP    dextrose 10 % infusion 0-250 mL, 0-250 mL, IntraVENous, PRN, Ita Harvey NP    insulin lispro (HUMALOG) injection, , SubCUTAneous, AC&HS, Ita Harvey NP, 3 Units at 01/31/23 1729    balsam peru-castor oiL (VENELEX) ointment, , Topical, BID, Ita Harvey NP, Given at 02/01/23 0905    metoprolol succinate (TOPROL-XL) XL tablet 12.5 mg, 12.5 mg, Oral, Q12H, Jerry Dixon MD, 12.5 mg at 02/01/23 0900    lidocaine 4 % patch 1 Patch, 1 Patch, TransDERmal, Q24H, Dmitriy Crowder MD, 1 Patch at 01/31/23 1245    amiodarone (CORDARONE) tablet 400 mg, 400 mg, Oral, BID, Raul Aviles MD, 400 mg at 02/01/23 0901    aspirin delayed-release tablet 81 mg, 81 mg, Oral, DAILY, Heber Barajas MD, 81 mg at 02/01/23 0900    sodium chloride (NS) flush 5-40 mL, 5-40 mL, IntraVENous, Q8H, Heber Barajas MD, 10 mL at 02/01/23 4164    sodium chloride (NS) flush 5-40 mL, 5-40 mL, IntraVENous, PRN, Heber Barajas MD    acetaminophen (TYLENOL) tablet 650 mg, 650 mg, Oral, Q6H PRN, 650 mg at 01/28/23 0003 **OR** acetaminophen (TYLENOL) suppository 650 mg, 650 mg, Rectal, Q6H PRN, Heber Barajas MD    polyethylene glycol (MIRALAX) packet 17 g, 17 g, Oral, DAILY PRN, Heber Barajas MD    ondansetron (ZOFRAN ODT) tablet 4 mg, 4 mg, Oral, Q8H PRN, 4 mg at 01/30/23 1357 **OR** ondansetron (ZOFRAN) injection 4 mg, 4 mg, IntraVENous, Q6H PRN, Heber Barajas MD, 4 mg at 01/31/23 2035    [Held by provider] furosemide (LASIX) injection 40 mg, 40 mg, IntraVENous, BID, Heber Barajas MD, 40 mg at 01/30/23 0942    heparin (porcine) pf 500 Units, 500 Units, InterCATHeter, PRN, Heber Barajas MD    pantoprazole (PROTONIX) tablet 40 mg, 40 mg, Oral, ACB, Heber Barajas MD, 40 mg at 02/01/23 9547    rosuvastatin (CRESTOR) tablet 20 mg, 20 mg, Oral, QHS, Heber Barajas MD, 20 mg at 01/31/23 2032    milrinone (PRIMACOR) 20 MG/100 ML D5W infusion, 0.2 mcg/kg/min, IntraVENous, CONTINUOUS, Heber Barajas MD, Last Rate: 3.3 mL/hr at 01/30/23 1030, 0.2 mcg/kg/min at 01/30/23 1030    heparin (porcine) pf 500 Units, 500 Units, InterCATHeter, DAILY, Heber Barajas MD, 500 Units at 02/01/23 0902    PATIENT CARE TEAM:  Patient Care Team:  Hollie Morrow MD as PCP - General (Family Medicine)  Hollie Morrow MD as PCP - [facility]  Sagrario Restrepo MD (Cardiovascular Disease Physician)  Jose Selby MD (Gastroenterology)  Grzegorz Bates Madalyn Ace MD (Cardiothoracic Surgery)  Aspen Mosley MD (Cardiovascular Disease Physician)  Iva Bañuelos MD (Nephrology)  Ashleigh Jang RN as Care Transitions Nurse  Ting Carter MD (Pulmonary Disease)  Reuben Bethea MD ([address] [place] [address] Vascular Surgery)     Thank you for allowing me to participate in this patient's care.     Gus Ann NP   [address] [place] [address], [address]  Phone: [phone]

## 2023-02-02 ENCOUNTER — DOCUMENTATION ONLY (OUTPATIENT)
Dept: CARDIOLOGY CLINIC | Age: 73
End: 2023-02-02

## 2023-02-02 NOTE — PROGRESS NOTES
Patient name: Lorenzo Foreman  MRN: [id number]    Nephrology Progress note:    Assessment:  TANNER on CKD-3a: Cr up to 2mg/dl and now holding. Suspect cardiorenal effects with needed diuresis. Recent baseline Cr has been in the low 1s. Hyperkalemia: K bump to 5.4-> 2 to oral KCL/evolving TANNER. Better s/p Lokelma dose     Ischemic CM: Recurrent exacerbations. EF 20%. On continuous Milrinone since last admission. Interested in LVAD     BPH: s/p donnelly     Well differentiated NET Grade 1: noted on EGD 1/18/23     Anemia 2 to CKD:     Hyponatremia: mild-> better     Left UE basilic and radial vein thrombus    Plan/Recommendations:  IV Albumin 25gm x1 dose today  Holding IV Lasix  Holding KCl  Continuous Primacor drip  IV Abx finished  BRIGHT  Appreciate oncology involvement/insight  Palliative care involvement also appreciated  Strict I/Os  Am labs         Subjective:  Reports restless night. Did not sleep much. Felt more comfortable with O2. Reports early satiety/mild nausea after eating.  MAPS fluctuating    ROS:   no vomiting  No chest pain    Exam:  Visit Vitals  BP (!) 99/37   Pulse 83   Temp 98 °F (36.7 °C)   Resp 19   Ht 5' 9\" (1.753 m)   Wt 55.6 kg (122 lb 9.2 oz)   SpO2 98%   BMI 18.10 kg/m²     Wt Readings from Last 3 Encounters:   02/02/23 55.6 kg (122 lb 9.2 oz)   01/25/23 55.8 kg (123 lb)   01/23/23 54.9 kg (121 lb)       Intake/Output Summary (Last 24 hours) at 2/2/2023 0846  Last data filed at 2/2/2023 0446  Gross per 24 hour   Intake 1314.2 ml   Output 525 ml   Net 789.2 ml Gen: NAD/Frail  HEENT: AT/NC  Lungs/Chest wall: Clear. No accessory muscle use. Cardiovascular: Regular rate, normal rhythm. Abdomen/: Soft, NT, ND, BS+. +Vasquez  Ext: No peripheral edema  CNS: alert and awake.  Answers appropriately      Current Facility-Administered Medications   Medication Dose Route Frequency Last Admin    traZODone (DESYREL) tablet 50 mg  50 mg Oral QHS PRN 50 mg at 02/01/23 2205    insulin lispro (HUMALOG) injection 4 Units  4 Units SubCUTAneous TID WITH MEALS 4 Units at 02/01/23 1723    insulin glargine (LANTUS) injection 10 Units  10 Units SubCUTAneous DAILY 10 Units at 02/02/23 0830    apixaban (ELIQUIS) tablet 10 mg  10 mg Oral BID 10 mg at 02/02/23 0831    Followed by    Reza Calixto ON 2/6/2023] apixaban (ELIQUIS) tablet 5 mg  5 mg Oral BID      [Held by provider] potassium chloride SR (KLOR-CON 10) tablet 20 mEq  20 mEq Oral BID 20 mEq at 01/30/23 1030    glucose chewable tablet 16 g  4 Tablet Oral PRN      glucagon (GLUCAGEN) injection 1 mg  1 mg IntraMUSCular PRN      dextrose 10 % infusion 0-250 mL  0-250 mL IntraVENous PRN      insulin lispro (HUMALOG) injection   SubCUTAneous AC&HS 2 Units at 02/01/23 2205    balsam peru-castor oiL (VENELEX) ointment   Topical BID Given at 02/02/23 0840    metoprolol succinate (TOPROL-XL) XL tablet 12.5 mg  12.5 mg Oral Q12H 12.5 mg at 02/02/23 0830    lidocaine 4 % patch 1 Patch  1 Patch TransDERmal Q24H 1 Patch at 02/01/23 1225    amiodarone (CORDARONE) tablet 400 mg  400 mg Oral  mg at 02/02/23 8901    aspirin delayed-release tablet 81 mg  81 mg Oral DAILY 81 mg at 02/02/23 0830    sodium chloride (NS) flush 5-40 mL  5-40 mL IntraVENous Q8H 10 mL at 02/02/23 0637    sodium chloride (NS) flush 5-40 mL  5-40 mL IntraVENous PRN      acetaminophen (TYLENOL) tablet 650 mg  650 mg Oral Q6H  mg at 02/02/23 0144    Or    acetaminophen (TYLENOL) suppository 650 mg  650 mg Rectal Q6H PRN      polyethylene glycol (MIRALAX) packet 17 g 17 g Oral DAILY PRN      ondansetron (ZOFRAN ODT) tablet 4 mg  4 mg Oral Q8H PRN 4 mg at 01/30/23 1357    Or    ondansetron (ZOFRAN) injection 4 mg  4 mg IntraVENous Q6H PRN 4 mg at 01/31/23 2035    [Held by provider] furosemide (LASIX) injection 40 mg  40 mg IntraVENous BID 40 mg at 01/30/23 0942    heparin (porcine) pf 500 Units  500 Units InterCATHeter PRN      pantoprazole (PROTONIX) tablet 40 mg  40 mg Oral ACB 40 mg at 02/02/23 0636    rosuvastatin (CRESTOR) tablet 20 mg  20 mg Oral QHS 20 mg at 02/01/23 2205    milrinone (PRIMACOR) 20 MG/100 ML D5W infusion  0.2 mcg/kg/min IntraVENous CONTINUOUS 0.2 mcg/kg/min at 02/01/23 1837    heparin (porcine) pf 500 Units  500 Units InterCATHeter DAILY 500 Units at 02/01/23 0902       Labs/Data:    Lab Results   Component Value Date/Time    WBC 6.8 02/02/2023 01:41 AM    HGB 8.3 (L) 02/02/2023 01:41 AM    HCT 28.7 (L) 02/02/2023 01:41 AM    PLATELET 075 08/93/8829 01:41 AM    MCV 86.4 02/02/2023 01:41 AM       Lab Results   Component Value Date/Time    Sodium 135 (L) 02/02/2023 01:41 AM    Potassium 4.9 02/02/2023 01:41 AM    Chloride 106 02/02/2023 01:41 AM    CO2 24 02/02/2023 01:41 AM    Anion gap 5 02/02/2023 01:41 AM    Glucose 142 (H) 02/02/2023 01:41 AM    BUN 40 (H) 02/02/2023 01:41 AM    Creatinine 2.01 (H) 02/02/2023 01:41 AM    BUN/Creatinine ratio 20 02/02/2023 01:41 AM    GFR est AA 46 (L) 06/23/2022 09:40 AM    GFR est non-AA 38 (L) 06/23/2022 09:40 AM    eGFR 35 (L) 02/02/2023 01:41 AM    eGFR 69 01/25/2023 11:55 AM    Calcium 9.1 02/02/2023 01:41 AM       Patient seen and examined. Chart reviewed. Labs, data and other pertinent notes reviewed in last 24 hrs.     Discussed with patient    Signed by:  Pati Limon MD  [id number] [facility]

## 2023-02-02 NOTE — PROGRESS NOTES
0730: Bedside and Verbal shift change report given to LORI Corbett (oncoming nurse) by Juanis Yao RN (offgoing nurse). Report included the following information SBAR, Kardex, MAR, Recent Results, and Cardiac Rhythm NSR . Rate verified Milrinone gtt. 0836: BP 99/37, per MD Pearson hold metoprolol. 1000: Per NP Cristine Castillo, give metoprolol if SBP >100.     1115: Pt c/o of sacral soreness, blanchable redness, sacral foam applied. 1128: , telephone order received for 8 units of lispro sliding scale insulin. 1615: Bedside and Verbal shift change report given to Irma RN (oncoming nurse) by Timo Mendiola RN (offgoing nurse). Report included the following information SBAR, Kardex, MAR, and Cardiac Rhythm NSR . Care Plan:   Problem: Diabetes Self-Management  Goal: *Disease process and treatment process  Description: Define diabetes and identify own type of diabetes; list 3 options for treating diabetes. Outcome: Progressing Towards Goal  Goal: *Incorporating nutritional management into lifestyle  Description: Describe effect of type, amount and timing of food on blood glucose; list 3 methods for planning meals. Outcome: Progressing Towards Goal  Goal: *Incorporating physical activity into lifestyle  Description: State effect of exercise on blood glucose levels. Outcome: Progressing Towards Goal  Goal: *Developing strategies to promote health/change behavior  Description: Define the ABC's of diabetes; identify appropriate screenings, schedule and personal plan for screenings. Outcome: Progressing Towards Goal  Goal: *Using medications safely  Description: State effect of diabetes medications on diabetes; name diabetes medication taking, action and side effects. Outcome: Progressing Towards Goal  Goal: *Monitoring blood glucose, interpreting and using results  Description: Identify recommended blood glucose targets  and personal targets.   Outcome: Progressing Towards Goal  Goal: *Prevention, detection, treatment of acute complications  Description: List symptoms of hyper- and hypoglycemia; describe how to treat low blood sugar and actions for lowering  high blood glucose level. Outcome: Progressing Towards Goal  Goal: *Prevention, detection and treatment of chronic complications  Description: Define the natural course of diabetes and describe the relationship of blood glucose levels to long term complications of diabetes. Outcome: Progressing Towards Goal  Goal: *Developing strategies to address psychosocial issues  Description: Describe feelings about living with diabetes; identify support needed and support network  Outcome: Progressing Towards Goal  Goal: *Insulin pump training  Outcome: Progressing Towards Goal  Goal: *Sick day guidelines  Outcome: Progressing Towards Goal  Goal: *Patient Specific Goal (EDIT GOAL, INSERT TEXT)  Outcome: Progressing Towards Goal       Problem: Patient Education: Go to Patient Education Activity  Goal: Patient/Family Education  Outcome: Progressing Towards Goal       Problem: Pressure Injury - Risk of  Goal: *Prevention of pressure injury  Description: Document Mike Scale and appropriate interventions in the flowsheet.   Outcome: Progressing Towards Goal  Note: Pressure Injury Interventions:  Sensory Interventions: Float heels, Keep linens dry and wrinkle-free, Maintain/enhance activity level, Minimize linen layers    Moisture Interventions: Apply protective barrier, creams and emollients, Absorbent underpads    Activity Interventions: PT/OT evaluation, Pressure redistribution bed/mattress(bed type), Increase time out of bed    Mobility Interventions: PT/OT evaluation    Nutrition Interventions: Document food/fluid/supplement intake    Friction and Shear Interventions: Apply protective barrier, creams and emollients       Problem: Patient Education: Go to Patient Education Activity  Goal: Patient/Family Education  Outcome: Progressing Towards Goal       Problem: Falls - Risk of  Goal: *Absence of Falls  Description: Document Bertrum Mast Fall Risk and appropriate interventions in the flowsheet.   Outcome: Progressing Towards Goal  Note: Fall Risk Interventions:  Mobility Interventions: Communicate number of staff needed for ambulation/transfer, Mechanical lift, PT Consult for assist device competence, PT Consult for mobility concerns    Medication Interventions: Patient to call before getting OOB, Teach patient to arise slowly    Elimination Interventions: Call light in reach, Toilet paper/wipes in reach, Toileting schedule/hourly rounds    History of Falls Interventions: Assess for delayed presentation/identification of injury for 48 hrs (comment for end date), Room close to nurse's station, Investigate reason for fall       Problem: Patient Education: Go to Patient Education Activity  Goal: Patient/Family Education  Outcome: Progressing Towards Goal

## 2023-02-02 NOTE — PROGRESS NOTES
Spiritual Care Assessment/Progress Note  [facility]      NAME: Alivia Castaneda      MRN: [id number]  AGE: 71 y.o. SEX: male  Cheondoism Affiliation: Jehovah's witness   Language: English     2/2/2023     Total Time (in minutes): 15     Spiritual Assessment begun in [facility] through conversation with:         []Patient        [] Family    [] Friend(s)        Reason for Consult: Palliative Care, Initial/Spiritual Assessment     Spiritual beliefs: (Please include comment if needed)     [] Identifies with a lisa tradition:         [] Supported by a lisa community:            [] Claims no spiritual orientation:           [] Seeking spiritual identity:                [] Adheres to an individual form of spirituality:           [x] Not able to assess:                           Identified resources for coping:      [] Prayer                               [] Music                  [] Guided Imagery     [] Family/friends                 [] Pet visits     [] Devotional reading                         [x] Unknown     [] Other:                                               Interventions offered during this visit: (See comments for more details)    Patient Interventions: Initial visit           Plan of Care:     [] Support spiritual and/or cultural needs    [] Support AMD and/or advance care planning process      [] Support grieving process   [] Coordinate Rites and/or Rituals    [] Coordination with community clergy   [] No spiritual needs identified at this time   [] Detailed Plan of Care below (See Comments)  [] Make referral to Music Therapy  [] Make referral to Pet Therapy     [] Make referral to Addiction services  [] Make referral to [facility]  [] Make referral to Spiritual Care Partner  [] No future visits requested        [x] Contact Spiritual Care for further referrals     Attempted visit for palliative initial spiritual assessment. Patient's chart was consulted. Unable to visit patient at this time. Patient was sleeping and did not awake. No family present.    Chaplain Pederson MDiv, MS, [facility]

## 2023-02-02 NOTE — HOME HEALTH
Skilled reason for admission/summary of clinical condition: Cullen Jarvis admitted to home care for CHF. Nursing needed for PICC care, catheter care, assessment, dobutamine education. Diagnosis: acute on chronic chf  Subjective: \"i'm glad to be home but I didn't sleep last night\"  Caregiver: spouse. Caregiver assists with: Medications, Meals, Bathing, ADL, Wound Care, Transportation and Housekeeping Caregiver unable to assist with: n/a. Caregiver is available 24 hours/day Caregiver is present at this visit and did participate with clinician. Medications reconciled and all medications are available in the home this visit. The following education was provided regarding medications: medication interactions and look alike medications. Patient/CG able to demonstrate knowledge through teach back with 50 percent accuracy. Medications are somewhat effective at this time. MD notified of any discrepancies/medication interactions. A list of reconciled medications has been given to the patient/caregiver and a copy has been uploaded to media. High risk medication education provided on milrinone, januvia, metformin, glipizide and asa. able to teach back with 50% accuracy    Home health supplies by type and quantity ordered/delivered this visit include: betadine, foam  Patient/caregiver instructed on plan of care and are agreeable to plan of care at this time. Clinician reviewed orientation to home health booklet with patient/caregiver including agency phone number, agency complaint process, state hotline number, as well as joint commission's quality hotline number. Consent forms signed. Patient at risk for falls Yes:   Recommended requesting PT/OT orders due to fall risk YES: on inital referral  Patient response to recommended requesting of PT/OT orders: in agreeance    Discharge planning discussed with patient and caregiver.  Discharge planning as follows: will see indefinitely while on milrinone for PICC care.    Patient/caregiver did verbalize agreement with discharge planning. Clinical Assessment (What this means for the patient overall and need for ongoing skilled care):patient with extensive cardiac history who is now on milrinone via YOSHI PICC line. patient also has a donnelly catheter as he failed a voiding trial in the hospital. to follow up with urology later this week for a second voiding trial. patient has lifevest on and is able to teach how to change battery with 100% accuracy. PICC intact w/o s/s of infection or complications. patient and wife able to demonstrate flushing of open port on PICC with 75% accuracy. medication education provided and weekly pill box provided. during med rec, patient had many medications he is no longer taking which were placed in a bag that was marked \"do not take\" and placed in a closet. his current medications are seperated and on the table where they are easily accessible. DTI to right heel noted and patient/wife cleansing with soap and water and applying betadine daily. patient does have generalized pain but states he relates this to being in the hospital for nearly a month. wife is very attentive however she is overwhelmed. VS within defined parameters, assessment completed, PICC assessment and donnelly assessment completed, education provided. Written Teaching Material Utilized: medication list left in the home    Specific plan for next visit: continue education, milrinone bag change    Plan of care and admission to home health status called to attending physician.  The following practitioner has agreed to sign the ongoing POC , and was notified of the following: visit frequency of 2w1, 3w1, 4w1, 3w1, 2w6, 3prn    Interdisciplinary communication with:  PT and  OT for the purpose of OASIS collaboration    PCP: Dr. Roberto Ring  Next scheduled doctor appointment: 1/21/22  Patient/Caregiver instructed to keep follow up appointment because lack of follow through with physician appointments could result in discontinuation of home care services for non-compliance. Patient/Caregiver verbalize knowledge of above through teach back with 100 percent accuracy. Emergency Preparedness: Patient/Caregiver instructed in the following:  Have one gallon of water per person for at least 3 days on hand. Have non-perishable food for at least 3 days that do not need to be cooked. Have flashlights and batteries. Charge your cell phones and any back up lithium batteries for your cell phones. Have 3+ days of back up oxygen in your home. Have a phone in your home that is hard wired and does not require power. Have medication for a week in your home. Make sure you have a caregiver in the home to provide care in case your home health nurse cannot get to your house. Make sure you have all of your paperwork i.e. written emergency preparedness plan, Identification, insurance cards, DME phone number, physician and pharmacy phone number, agency phone number, and your medications in one place for easy access and in a zip lock bag to protect them. Take your Admission Handbook, written emergency preparedness plan, written medication list and folder if you relocate in the event of an emergency, if possible. Call agency if you relocate so we can contact you. Patient/Caregiver verbalize knowledge of above through teach back with 75 percent accuracy.     IEP uploaed to media

## 2023-02-02 NOTE — PROGRESS NOTES
Problem: Mobility Impaired (Adult and Pediatric)  Goal: *Acute Goals and Plan of Care (Insert Text)  Description: FUNCTIONAL STATUS PRIOR TO ADMISSION: Patient was modified independent using a rollator for functional mobility. Pt recently d/c home after previous hospital stay. Able to ambulate community distances. HOME SUPPORT PRIOR TO ADMISSION: The patient lived with spouse but did not require assist.    Physical Therapy Goals  Initiated 1/28/2023  1. Patient will move from supine to sit and sit to supine  in bed with modified independence within 7 day(s). 2.  Patient will transfer from bed to chair and chair to bed with modified independence using the least restrictive device within 7 day(s). 3.  Patient will perform sit to stand with modified independence within 7 day(s). 4.  Patient will ambulate with modified independence for 300 feet with the least restrictive device within 7 day(s). 5.  Patient will ascend/descend 12 stairs with 1 handrail(s) with supervision/set-up within 7 day(s). Outcome: Not Met   PHYSICAL THERAPY TREATMENT  Patient: Mariposa Osorio (76 y.o. male)  Date: 2/2/2023  Diagnosis: CHF (congestive heart failure) ([facility] [I50.9] <principal problem not specified>      Precautions: Fall  Chart, physical therapy assessment, plan of care and goals were reviewed. ASSESSMENT  Patient continues with skilled PT services. Progress toward goals limited by fatigue and onset of dizziness/ weakness after initiation of gait training. Pt received in chair with supportive family present; continues with soft BP, motivated to ambulate. Reports up in chair x many hours. Pt transferred sit to stand to rollator with min A. Denied symptoms associated with low BP (102/47). Pt amb around foot of bed and requested amb in hallway. Instructed pt to continue around bed to allow for seated rest and vitals assessment.  Pt with onset of instability requiring mod A to maintain balance, unable to articulate symptoms, required increased time and mod A to pivot to seat of rollator and assist of family for line mgmt. After safely sitting, pt unable to describe symptoms causing instability. /43 in sitting. Pivoted to bed with min A for balance. Positioned for comfort and left with all needs in reach, family in attendance. Will con't to follow for mobility progression as tolerated. Anticipate need for family assist and HH PT at d/c. Current Level of Function Impacting Discharge (mobility/balance): min/ mod A short distance amb with rollator    Other factors to consider for discharge: good family support         PLAN :  Patient continues to benefit from skilled intervention to address the above impairments. Continue treatment per established plan of care. to address goals. Recommendation for discharge: (in order for the patient to meet his/her long term goals)  Physical therapy at least 2 days/week in the home AND ensure assist and/or supervision for safety with mobility    This discharge recommendation:  Has been made in collaboration with the attending provider and/or case management    IF patient discharges home will need the following DME: patient owns DME required for discharge       SUBJECTIVE:   Patient stated I think we can go in the ravi right before onset of dizziness with initiation of gait training    OBJECTIVE DATA SUMMARY:   Critical Behavior:  Neurologic State: Alert  Orientation Level: Oriented X4  Cognition: Follows commands, Appropriate decision making  Safety/Judgement: Awareness of environment, Insight into deficits  Functional Mobility Training:  Bed Mobility:        Sit to Supine: Minimum assistance (light assist at LEs)           Transfers:  Sit to Stand: Minimum assistance  Stand to Sit: Minimum assistance  Stand Pivot Transfers: Minimum assistance (rollator seat to bed stand pivot min A for balance)                          Balance:  Sitting: Intact  Standing: Impaired; With support  Standing - Static: Fair;Constant support  Standing - Dynamic : Fair;Constant support  Ambulation/Gait Training:  Distance (ft): 12 Feet (ft)  Assistive Device: Gait belt;Walker, rollator  Ambulation - Level of Assistance: Minimal assistance; Moderate assistance (impacted by onset of dizziness/ weakness)        Gait Abnormalities: Decreased step clearance              Speed/Bette: Pace decreased (<100 feet/min)  Step Length: Left shortened;Right shortened        Pain Rating:  Pt denies c/o pain    Activity Tolerance:   Fair    After treatment patient left in no apparent distress:   Supine in bed, Heels elevated for pressure relief, Call bell within reach, Caregiver / family present, and Side rails x 3    COMMUNICATION/COLLABORATION:   The patients plan of care was discussed with: Registered nurse.      Angel Lopez PT   Time Calculation: 41 mins

## 2023-02-02 NOTE — DIABETES MGMT
[address] [facility]  DIABETES MANAGEMENT CONSULT    Consulted by  Danny White MD   for advanced nursing evaluation and care for inpatient blood glucose management. Evaluation and Action Plan   Raghu Shah is a 71year old gentleman, with Type 2 Diabetes with a recent A1C of 7.7%, who is admitted with arrhythmias and acute on chronic HFrEF with failure to respond to inotrope support. He was discharged one week prior from a prolonged hospital stay for acute on chronic HF and LVAD work-up and discharged home on dobutamine with a lifevest.  Glucose control was tenuous during his previous admission s/t multiple co-morbidities, several tests/procedures requiring NPO status, waxing and waining oral intake. At this time glucose is impacted by:  Heart Failure with reduced EF 25-30%  Milrinone therapy  TANNER: GFR 35  Infection: UTI and PNA, IV antibiotics   Oral intake     Last admission, he required low basal doses but high bolus doses with meals to offset pre-prandial hyperglycemia. He is experiencing pre-prandial hyperglycemia now despite moderate dose bolus humalog resumed. Will increase mealtime humalog. Basal insulin to stay as ordered as fasting BG have remained controlled. Individualized BG target is closer to 180mg/dl. Management Rationale Action Plan   Medication   Basal needs Using 0.2 units/kg/D Lantus 10 units daily   Nutritional needs Using resistant sensitivity based on degree of pre-prandial hyperglycemia Increased to 6 units Humalog/meal    Hold if patient is NPO or consumes less than 50% of carbohydrates on meal tray   Corrective insulin Using normal sensitivity  Normal sensitivity ACHS     Referral [x] Inpatient nutrition services   Additional orders  Carb consistent diet. Additional recommendations per RD: they were very helpful last admission with malnutrition, poor appetite, and hyperglycemia. Initial Presentation   Raghu Shah is a 71 y.o. male who presented to the ED 1/26/23 with lower extremity swelling and difficulty breathing. He had a prolonged hospital admission from 12/22/22-1/19/23 for acute on chronic systolic HF and LVAD work-up. He was discharged with home inotrope. LAB: , GFR 58, Trop 2003, BNP 4524  CXR: New diffuse bilateral increased interstitial markings, partially obscuring bilateral pulmonary nodules. HX:   Past Medical History:   Diagnosis Date    CAD (coronary artery disease) 11/10/2016    NSTEMI & 2 stents    Deafness 10/28/2012    DM (diabetes mellitus) ([facility]     Elevated cholesterol     Hypertension     NSTEMI (non-ST elevated myocardial infarction) ([facility] 11/10/2016        INITIAL DX:   CH[facility]estive [facility] [I50.9]     Current Treatment     TX: Milrinone, Amio. Specialty consultations: [facility], Cardiology, EP, GI     Hospital Course   Clinical progress has been complicated by:    2/05: Admission. Rapid response for SVT- Given adenosine x2, amio bolus/gtt  1/27: Advanced HFC consult. EP consult ordered. Intolerance of inotropic support. Cardiology consult. NSTEMI, heparin gtt started. Seen by EP: Continue amio. 1/28: Febrile: CT chest 1/28 shows diffuse focal opacities concerning for pneumonia. Obtain blood cultures, UA. Pathology report 1/18/23: well differentiated neuroendocrine tumor grade 1. EGD: Patchy erythema gastric body, biopsies done. 6 mm sessile polyp gastric body biopsied  1/30: Oncology consult: Recommend multiphase CT of the abdomen and pelvis to evaluate for metastatic spread. Tachycardia and SOB, BiPAP overnight.    Diabetes History   Type 2 Diabetes  Ambulatory BG management provided by: PCP Benja Watters MD    Diabetes-related Medical History  Acute complications  Acute hyperglycemia  Neurological complications  Peripheral neuropathy  Microvascular disease  Nephropathy  Macrovascular disease  CAD  Other associated conditions                                       CHF Diabetes Medication History  Key Antihyperglycemic Medications        Diabetes Medication History  Key Antihyperglycemic Medications               metFORMIN (GLUCOPHAGE) 500 mg tablet (Taking) Take 1 Tablet by mouth two (2) times daily (with meals) for 30 days. glipiZIDE (GLUCOTROL) 5 mg tablet (Taking) Take 1 tablet by mouth twice daily    Januvia 50 mg tablet (Taking) Take 1 tablet by mouth once daily             Diabetes self-management practices:   Eating pattern                Eats 3 small meals daily  [x]         Breakfast                         2 Eggs, Coffee  [x]         Lunch                               Esmond  [x]         Dinner                              \" Food\" Bread, rice, lentils   [x]         Bedtime                           [place]  [x]         Snacks                              Afternoon snack  [x]         Beverages                        Water, Coffee  Physical activity pattern                Sedentary   Monitoring pattern  Discharged last week with a Carolynn CGM and serum glucometer  7 day average Ba-9a: 129-164  9a-12: 243  Noon to 9p: 198-238  1 low BG in the last week overnight    Taking medications pattern  [x]         Consistent administration  [x]         Affordable  Social determinants of health impacting diabetes self-management practices   Concerned that you need to know more about how to stay healthy with diabetes  Overall evaluation:                 [x]         Achieving A1c target with drug therapy & self-care practices    Subjective        Objective   Physical exam  General Underweight [place] [facility]) male in acute distress/ill-appearing. Neuro  Alert, oriented   Vital Signs Visit Vitals  BP (!) 103/52 (BP 1 Location: Left upper arm, BP Patient Position: At rest)   Pulse 91   Temp 97.4 °F (36.3 °C)   Resp 29   Ht 5' 9\" (1.753 m)   Wt 55.6 kg (122 lb 9.2 oz)   SpO2 95%   BMI 18.10 kg/m²     Skin  Warm and dry. No acanthosis noted along neckline.    Heart   Regular rate and rhythm. No murmurs, rubs or gallops  Lungs  Clear to auscultation without rales or rhonchi  Extremities No foot wounds        Laboratory  Recent Labs     23  0141 23  0136 23  0323   * 94 221*   AGAP 5 7 7   WBC 6.8 6.3 7.6   CREA 2.01* 2.08* 2.09*   AST  --   --  14*   ALT  --   --  16         Factors impacting BG management  Factor Dose Comments   Nutrition:  Standard meals     60 grams/meal      Heart Failure/NSTEMI/Ischemic cardiomyopathy/Arrhythmias  Milrinone  BNP 4879  EF 25-30%    Infection UTI/PNA  Urine Cx pending   IV antibiotics   FEvers    Other:   Kidney function TANNER on CKD:   Current GFR 35      Blood glucose pattern    Significant diabetes-related events over the past 24-72 hours  UA on admission: 100 glucose, yeast, moderate leuk esterase  A1C 7.7% 23  Fasting B (142 on am labs)  Pre-prandial: 209-354  Basal: Lantus 10 units  Bolus: 4 units Humalog/meal, dose missed this morning.   Correction: 15 units in the last 24h  Eating ok- drinking supplements       Assessment and Nursing Intervention   Nursing Diagnosis Risk for unstable blood glucose pattern   Nursing Intervention Domain 5250 Decision-making Support   Nursing Interventions Examined current inpatient diabetes/blood glucose control   Explored factors facilitating and impeding inpatient management  Explored corrective strategies with patient and responsible inpatient provider   Informed patient of rational for insulin strategy while hospitalized     Nursing Diagnosis 41741 Ineffective Health Management   Nursing Intervention Domain 5250 Decision-making Support   Nursing Interventions Identified diabetes self-management practices impeding diabetes control  Discussed diabetes survival skills related to  Healthy Plate eating plan; given handouts  Role of physical activity in improving insulin sensitivity and action  Procedure for blood glucose monitoring & options for low-cost products  Medications plan at discharge     Billing Code(s)     No charge    Before making these care recommendations, I personally reviewed the hospitalization record, including notes, laboratory & diagnostic data and current medications, and examined the patient at the bedside (circumstances permitting) before determining care. More than fifty (50) percent of the time was spent in patient counseling and/or care coordination.   Total minutes: 10    Shante Bolden, CNS  Diabetes Clinical Nurse Specialist  [facility]  Access via [employer]

## 2023-02-02 NOTE — PROGRESS NOTES
Transitions of Care Plan  RUR: 28% - high  Clinical Update: pt will be discussed at LVAD MRB tomorrow for VAD determination; possible implant this admission pending workup; current milrinone; Lifevest  Consults: Multiple  Baseline: ambulates w RW; resides w wife  Barrier(s) to Discharge: medical  Disposition:   Home Health: [address] [facility] Infusion: [facility]  DME: Gabriel Fabry  *All subject to change pending LVAD determination*  Estimated Discharge Date: 2+ days    Clinical update per chart review and/or patient discussed during Interdisciplinary Rounds:    Patient is not medically stable for discharge due to ongoing medical needs. CM continues to follow treatment plan for medical progress and disposition needs.     Disposition:  Pending LVAD determination; see above plan    Jenni Jolly, MPH  Care Manager [facility]  Available via [facility] or

## 2023-02-02 NOTE — PROGRESS NOTES
MECHANICAL CIRCULATORY SUPPORT & TRANSPLANT EVALUATION  Advanced Heart Failure Cristela [address]   1950            71 y.o.   male   Ethnicity:    Marital status:   [address] [place] [id number]     Phone: [phone]  MRN: [id number]          INTERMACS: 3  Time of GDMT: unable to tolerate   Height:         Ht Readings from Last 1 Encounters:   01/18/23 5' 9\" (1.753 m)      Weight:   Wt Readings from Last 3 Encounters:   02/03/23 121 lb (54.9 kg)   01/25/23 123 lb (55.8 kg)   01/23/23 121 lb (54.9 kg)         BMI: 17.25  Blood type: B POSITIVE  Referring Gertrudis Nicholson NP  Date Consented to Eval: 01/11/2023  Date of Presentation: 01/27/2023  Represented 02/02/23      Cardiac history:  IMPRESSION:  Acute on chronic combined systolic/diastolic  heart failure  Stage D, NYHA class IIIB/IV symptoms   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, LVEF 25-30% and 23% (by cMRI 1/3/23)  Cardiac MRI suggestive of ischemic cardiomyopathy  PYP equivocal  Initiation of chronic milrinone infusion as palliation (6MW   Coronary artery disease  CAD s/p CABG x 2: further disease best managed medically due to small vessel size   At risk of sudden cardiac death  Peripheral arterial disease  Bilateral hydronephrosis s/p donnelly  Cardiac risk factors:  HTN  HL  TRISTAN, STOP-BANG 4  DM2  CKD, stage 3  MOCA from 1/4/22 17/30, consistent with mild cognitive impairment  Full code       Devices: lifevest   Sternotomies: 1 Other medical history:  TANNER on CKD III  BPH w/ urinary retention  Hematuria  CAD s/p CABG  Type 2 DM  PAD  Hard of hearing  Gastric Neuroendocrine Tumor  Sepsis, unclear source     GERD     Other surgical history:  As above      Medications:      amiodarone (CORDARONE) 400mg BID   milrinone (PRIMACOR)- 0.2 mcg/kg/min    epoetin heidy-epbx (RETACRIT) injection 10,000 Units   SQ weekly     apixaban (ELIQUIS) tablet 10 mg  BID milrinone (PRIMACOR) 20 MG/100 ML D5W infusion       Metoprolol succinate 12.5mg Q12 hours         pantoprazole (PROTONIX) 40 mg tablet- daily                      rosuvastatin (CRESTOR) 20 mg tablet-daily      aspirin delayed-release 81 mg tablet-daily     Allergies:  No Known Allergies          Cardiovascular imaging:  Echo   (01/27/23) limited     Left Ventricle: EF by visual approximation is 20%. Left ventricle is mildly dilated. Mitral Valve: Moderately thickened leaflet, at the anterior and posterior leaflets. Moderately calcified leaflet. Moderate regurgitation. Left Atrium: Left atrium is moderately dilated. Technical qualifiers: Echo study was technically difficult with poor endocardial visualization. Contrast used: Definity. Limited study, not all structures viewed    (01/09/2023): limited:    Left Ventricle: Severely reduced left ventricular systolic function with a visually estimated EF of 25 - 30%. Left ventricle size is normal. Mildly increased wall thickness. Right ventricle size is normal. Normal systolic function. (12/26/2022): complete:     Left Ventricle: Severely reduced left ventricular systolic function with a visually estimated EF of 25 - 30%. Left ventricle size is normal. Normal wall thickness. There are regional wall motion abnormalities. Grade II diastolic dysfunction with increased LAP. Right Ventricle: Moderately reduced systolic function. TAPSE is abnormal. TAPSE is 1.1 cm. Aortic Valve: Mild stenosis of the aortic valve. AV peak gradient is 13 mmHg. AV peak velocity is 1.8 m/s. Mitral Valve: Not well visualized. Moderate annular calcification at the posterior leaflet of the mitral valve. Mild to moderate regurgitation. Tricuspid Valve: Mildly elevated RVSP. Left Atrium: Left atrium is moderately dilated. LViDD not reported  Cardiac MRI: (01/03/23): IMPRESSION     1. Normal left ventricular cavity size.  Severe left ventricular systolic  dysfunction. Severe hypokinesis of the base to mid and distal anterior wall,  anteroseptal wall, anteroapical wall, apical septal wall and apex. Mild  hypokinesis of the lateral wall. 3-D LVEF 23%. 2. Normal right ventricular size and systolic function. 3. Sclerotic aortic valve leaflets. Mild to moderate aortic valve stenosis. Aortic valve area is 1.3 sq cm by planimetry. 4. On T2 W imaging there is no significant myocardial edema seen. On EGE and LGE  study, there is patchy subendocardial infarct involving LAD territory including  mid to distal anterior wall, anteroseptal wall, anteroapical wall, apex, apical  septal wall involving less than 25% thickness of the myocardial wall with  thinning of the apex and apical septal wall. There is medium grade myocardial  viability of these walls. There is a small to medium size subendocardial infarct  of the basal inferior wall. There is mild patchy subepicardial enhancement of  the lateral wall suggestive of nonischemic etiology. There is no features of  infiltrative sarcoidosis or cardiac amyloidosis. 5. Normal pleura and pericardium. There is no significant effusions. PYP test: (12/29/2022): FINDINGS:  Semiquantitative visual grading of myocardial radiotracer uptake compared to osseous/rib uptake: Myocardial uptake less than rib uptake (visual score: 1).     H/CL ratio: 1.45     IMPRESSION: Nuclear Cardiac Amyloid SPECT:  Equivocal for myocardial TTR amyloidosis. [facility]: (12/06/22): Findings  1. Normal LVEDP  2. Severe native multivessel coronary artery disease  3. Patent LIMA to LAD and vein graft to distal RCA  4. Recurrent ISR in OM1 stent with now 60 to 70% restenosis  5. Recoil of left main and circumflex stent with now recurrent 40 to 50% stenosis. 6.  Progression of ostial left main disease now to about 60% stenosis  7. Progression of disease in jailed first marginal branch now with diffuse 90% stenosis  8.   High-grade stenosis in the mid to distal right potential femoral artery treated with 6 x 40 mm impact drug-coated balloon angioplasty to reduce the stenosis to less than 40%     Access: Right femoral ultrasound-guided no issues  Contrast 60 cc     Recommendations  1. Continue guideline directed medical therapy for secondary prevention of coronary events  2. Diffuse nature of disease and relatively small size of his arteries make medical management is the best possible option for his coronary artery disease. 3.  Plavix based dual antiplatelet therapy     NST: (10/27/2022):   Resting ECG ECG is abnormal. The ECG shows sinus rhythm. 0.5 mm of horizontal ST depression in the inferolateral leads was noted. The ECG shows premature atrial contractions. Stress ECG There were no arrhythmias during stress. No ST deviation was noted. There were no noted arrhythmias during recovery. The ECG was negative for ischemia. Stress Test A pharmacological stress test was performed using lexiscan. PO caffeine given as a reversal agent. Blood pressure demonstrated a normal response and heart rate demonstrated a normal response to stress. The patient's heart rate recovery was normal. The patient reported no symptoms during the stress test. The patient reached the end of the protocol.          Viability: EKG: (01/27/2023)   Component 6 d ago   (1/27/23)   Ventricular Rate 121    Atrial Rate 121    P-R Interval 128    QRS Duration 70    Q-T Interval 422    QTC Calculation (Bezet) 599    Calculated P Axis 83    Calculated R Axis 87    Calculated T Axis 131    Diagnosis Sinus tachycardia   Possible Left atrial enlargement   Marked ST abnormality, possible inferolateral subendocardial injury   Abnormal ECG   When compared with ECG of 26-JAN-2023 21:06,   Nonspecific T wave abnormality has replaced inverted T waves in Inferior   leads   T wave inversion no longer evident in Anterior leads   Confirmed by Leoncio Caban MD ([id number]) on 1/27/2023 12:30:26 PM      RHC: (01/09/2023)   PA: 20/9/13   RA Mean: 3  PCWP Mean: 8  Curtis CI: 1.82  SVR: 2297.83 dsc-5  PVR: 133.6 dsc-5     CPEST:      6MW:   6MWT     Date: 01/17/2023   Pre/Post HR: 99/104   Pre/Post SpO2: 98/97   Distance (meters): 149    Abdominal ultrasound:(01/12/2023): IMPRESSION     1. Cholelithiasis. No evidence of acute cholecystitis. 2. Diffuse fatty infiltration of the liver. Ankle-Brachial Index: : (1/11/23): Mild arterial obstruction on the right and no hemodynamically significant arterial obstruction on the left. Moderate to severe small digit disease on the right and severe on the left. Abnormal low peak systolic brachial blood pressure on the right vs left and a more proximal obstruction/occlusion cannot be completely excluded. Carotid doppler: (1/11/23): Consistent with mild, 1-49%, stenosis of the right and left internal carotid arteries. The left vertebral artery is patent with antegrade flow. The right vertebral artery was not well visualized. The proximal common carotid artery could not be visualized due to bandage placement. Non-cardiac imaging:  CT chest: 01/29/23:   IMPRESSION  Patchy focal areas of airspace infiltrate in the upper and lower  lobes of both lungs suspicious for pneumonia in appropriate clinical setting  (pattern which may be seen in Covid 19). There is superimposed interstitial  edema and bilateral pleural effusions likely reflecting congestive failure. Incidentals as above including coronary artery disease. CT chest/abd/pelvis: (1/16/2022): IMPRESSION  1. Small ground glass nodules scattered throughout the lungs. Findings favor  infectious etiology. Recommend short-term follow-up chest CT in 4-6 weeks. 2.  Cholelithiasis. Resolution of previous pleural effusions. (12/22/2023): IMPRESSION     1. Bladder is massively distended with mild bilateral hydronephrosis. May  indicate bladder outlet obstruction.  Prostate is mildly enlarged  2. Gallstones. No acute cholecystitis  3. Small bilateral pleural effusions     Head CT: (02/01/23): IMPRESSION  1. No evidence of acute infarct or intracranial hemorrhage. 2. Mild periventricular white matter disease is likely secondary to chronic  small vessel ischemic changes. CT Maxillofacial:  (02/01/23)   IMPRESSION  No evidence of dental abscess. No evidence of sinusitis. No acute abnormalities. CXR: (01/26/2023)      IMPRESSION  New diffuse bilateral increased interstitial markings, partially  obscuring bilateral pulmonary nodules. CT can be performed for further  evaluation, as indicated. (01/01/23): FINDINGS: A single frontal view of the chest at 0953 hours shows stable  bibasilar atelectasis and interstitial prominence. No new focal infiltrate. Small right pleural effusion stable. .  The heart, mediastinum and pulmonary  vasculature are stable status post median sternotomy . The bony thorax is  unremarkable for age. Rere Gull IMPRESSION  Stable bibasilar atelectasis and interstitial prominence with small right  pleural effusion. .       (12/22/23)- PA/LAT  FINDINGS:  Median sternotomy and coronary vascularization. An abandoned epicardial pacer  leads. Cardiomediastinal silhouette is upper limits of normal in size. Widespread hazy  reticulonodular interstitial opacities. Patchy streaky retrocardiac and  bibasilar atelectasis/airspace disease. Small bilateral pleural effusions. No  discernible pneumothoraces. IMPRESSION  Widespread interstitial opacities bilateral mid to lower lung opacities  concerning for edema and/or atypical infection. Small bilateral pleural  effusions.          PFT - not performed in patient     Predicted Measured %   FVC         FEV1         FEV1/FVC         DLCO          EGD:     Esophagogastroduodenoscopy (EGD) Procedure Note 01/18/2023      Postoperative diagnosis: Gastric erythema  Gastric polyp     Findings:  Esophagus:normal  Stomach:Patchy erythema gastric body, biopsies done. 6 mm sessile polyp gastric body biopsied  Duodenum/jejunum:normal        Therapies:  none     Specimens: gastric body bx, gastric polyp bx           EBL: None     Complications:   None; patient tolerated the procedure well. Impression:    See Postoperative diagnosis above     Recommendations:  -Await pathology. , -Cardiac diet     Colonoscopy:Scopes 01/18/2023 Findings:  Rectum: normal  Sigmoid: normal  Descending Colon: normal  Transverse Colon: normal  Ascending Colon: normal  Cecum: normal     Specimens:    none     EBL: None     Complications: None; patient tolerated the procedure well.      Recommendations:      - Continue supportive care, cardiac diet     Signed By: Haroon Alonzo MD                        January 18, 2023      Mammogram: n/a     GYN/Pap smear: n/a      Dexa scan: not performed inpatient       Labs:  CBC:  (02/03/2023):   -WBC: 5.6  -HGB: 7.5 (L)   -PLT: 259  CMP:  (02/03/2023)  -Na: 137  -K: 4.6  -Glu: 129 (H)   -BUN: 42 (H)   -Cr: 2.12 (H)   -Ca: 9.1  -Tbili 0.5  -ALT: 14  -AST: 10 (L)   -Alk phos: 92   -Albumin: 3.0 (L)   ProBNP:  (02/03/2023): 5694(H)   ABG: (12/11/2022)   Calcium, ionized (POC)  1.22   BICARBONATE  19   Base deficit (POC)  5.4   Sample source  VENOUS BLOOD   CO2, POC  19   Sodium, POC  135 Low    Potassium, POC  6.2 High   Chloride, POC  109 High    Glucose, POC  290 High    Creatinine, POC  1.4 High    Lactic Acid (POC)  2.38 High   pH, venous (POC)  7.36   pCO2, venous (POC)  34.4 Low    pO2, venous (POC)  27       SHANIQUA:  (12/27/2022): negative   Blood culture: (01/28/2023): negative   CK: (01/04/2023): 87  Cortisol: (01/11/2023): 9.4  CRP:(01/15/2023): 1.28 (H)   COVID PCR: (12/22/2022): not detected   Digoxin level:  (01/27/2023): 0.7 (L)   ESR: (12/27/2022): 38 (H)   Fecal Occult: (01/11/2023): negative   Ferritin: (01/04/2023): 463 (H)   G6PD: (01/12/2023): 326  H.pylori Ab: (01/12/2023):  negative   Heparin Ab: (01/12/2023):  0.099  Hep C Ab: (01/11/2023): nonreactive   HgbA1C: (01/12/2023): 7.7 (H)   HIV 1,2 Ab: (01/11/2023): nonreactive   INR: (01/11/2023): 1.0  Iron sat: (01/04/2023): 29  Lactic acid: (01/06/2023): 1.3  LDH: (01/12/2023):  189  Lipid profile: (01/12/2023):    Component 1/12/23 0500   Cholesterol, total 170    Triglyceride 215 High     HDL Cholesterol 40    LDL, calculated 87    VLDL, calculated 43    CHOL/HDL Ratio 4.3       Lupus anticoagulant: (01/12/2023): not detected  Magnesium:  (02/03/2023):  2.1  MRSA Swab: (01/11/2023): not present   Plasma Free Hgb: (01/12/2023):  1.1  Prealbumin: (01/27/2023): 13.6 (L)   Procalcitonin: (01/27/2023): <0.05  Protein C : (01/12/2023) : 114  Protein S:(01/12/2023): 68  Prothrombin gene mutation: (01/12/2023): not detected   PSA:(01/17/2023) 2.2   PTH: (01/04/2023): 74.5  PTT: (01/27/2023): 51.7 (H)   RPR: (01/12/2023): nonreactive   ROLO: n/a  Troponin I: (01/28/2023): 2512 (H)   TSH  (01/28/2022): 3.52  T3:(01/12/2023): 2.1 (L)   T4 (12/27/2022): 1.1  Uric acid: (12/27/2022): 6.8  Vit D level (12/27/2022): 73.3  Gammopathy Profile:(12/27/2022):  -IgG level: 872  -M-spike: not observed   -Free Kappa: 94.2 (H)   -Free lambda: 42.4 (H)   -Kappa/lambda: 2.22 (H)       Urine Studies:  Cotinine- (01/27/23): negative   Peth: negative   Microalbumin: (01/27/22): 8.32  Pregnancy Test: n/a   UA:       Component 1/28/23 1211   Color YELLOW/STRAW       Appearance HAZY Abnormal     Specific gravity 1.010    pH (UA) 5.5    Protein TRACE Abnormal     Glucose 100 Abnormal     Ketone Negative    Bilirubin Negative    Blood LARGE Abnormal     Urobilinogen 1.0    Nitrites Negative    Leukocyte Esterase MODERATE Abnormal      Component 1/28/23 1211   WBC 5-10    RBC     Epithelial cells FEW       Bacteria Negative    Yeast PRESENT Abnormal     Budding yeast PRESENT Abnormal          UDS-(01/27/23): negative   Urine Cx: (01/28/2022): negative      Transplant Labs: no tx work up due to age   CMV IgG : (12/27/2023): 5.10 (H)   EBV IgG: (12/28/2023): Positive (!)   Hep A Ab  Hep B core Ab - total   Hep B surface Ag/Ab   HSV 1/HSV2 IgG   HTLV1, 2 IgG   Mumps IgG   Quantiferon Gold   Rubella IgG   Rubeola IgG   Toxo IgG: (12/27/2023): negative   Varicella IgG HLA Class I   HLA Class II               Immunizations:  COVID: x4 doses   Hep A  Hep B  Influenza- not noted   Pneumonia  Td- 2019   Zoster        Consultations:  Specialty Date: Results:   Advanced Heart Failure 02/02/2023 PLAN OF CARE:  70 y/o male with likely combined non-ischemic and ischemic cardiomyopathy, LVEF 25-30%, stage D, NYHA class IIIB/IV; initiated on home milrinone gtt as bridge to completion of LVAD workup  Most likely etiology of cardiomyopathy: CAD +/- TRISTAN +/- inappropriate sinus tach +/- undergoing workup  Patient presented with symptomatic SVT (a-flutter) with RVR converted to sinus tach after amio loading; EP following, remains in ST/SR on PO amio and BBx  Completing remainder of evaluation for LVAD for destination therapy while in-patient and supported in milrinone gtt; also undergoing treatment of suspected PNA, ruling out bacteremia. During LVAD eval, found to have well-differentiated neuroendocrine tumor on gastric body and gastric polyp, G1; with GI, Oncology and Palliative following. Per Oncology- this may be a very treatable tumor with good prognosis and thus would not preclude LVAD. No absolute contraindications to LVAD at this point, now with good prognosis from Oncology (see their note for details); continuing with LVAD eval, will be presented at [facility] tomorrow for decision on LVAD. Discussed today with pt, his wife and his children on by phone.      Cardiac surgery       EP  01/29     Assessment and Plan      Atrial flutter with rapid ventricular rate- terminated with IV amiodarone  Stopped corlanor  I explained to him and his daughter about risks and benefits of amiodarone  Short term he can use it.  He is loaded day 3 now  Plan to reduce to 400 mg every day after total 14 days at 400 mg bid and then 200 mg every day  Agree with PFT and DLCO  Toprol XL 12.5 mg every day low dose  Long term it may cause lung and thyroid problem in him. Otherwise consider biventricular ICD and AV node ablation   If he is turned down for LVAD then I recommend above procedure. Atrial fibrillation and atypical atrial flutter ablation can be considered but NSR may not last long and procedure is higher risk for him with general anesthesia and severe cardiomyopathy, low bp tendency  Heparin now but may consider eliquis low dose due to weight and renal failure  I may recommend Watchman device  I will follow up with him in office   He has life vest defibrillator at this time     Ischemic cardiomyopathy and acute on chronic systolic CHF- continue meds with Dr Keesha Neves and LVAD evaluation  On milrinone lower dose     CAD with troponin elevation- Dr Roberto Ring and Chicho Arriaga reviewed previous cath film  No more revascularization can be done     Chronic anemia and renal failure     Wendy Trimble M.D. [facility] [place]  Electrophysiology/Cardiology  [facility] [place]  [address] [place] [id number]                                               Gastroenterology 01/31/23 01/18/2023 01/18/2023   Assessment / Plan :      Mr. Orion Rodriguez was initially referred to our service for EGD/colonoscopy as part of his LVAD workup. Incidental finding on EGD pathology that indicated well- differentiated neuroendocrine tumor G1. We have been re-consulted to evaluate new diagnosis.       Colonoscopy   Findings:  Rectum: normal  Sigmoid: normal  Descending Colon: normal  Transverse Colon: normal  Ascending Colon: normal  Cecum: normal     EGD   Findings:  Esophagus:normal  Stomach:Patchy erythema gastric body, biopsies done. 6 mm sessile polyp gastric body biopsied  Duodenum/jejunum:normal     EGD Pathology (1/18/23):    1. Stomach, body; biopsy:        Well-differentiated neuroendocrine tumor, G1; (see comment). Chronic active gastritis with intestinal metaplasia; no epithelial   dysplasia noted. No H. pylori-type organisms identified on H&E and IHC stained sections. 2. Stomach, polyp; polypectomy:        Well-differentiated neuroendocrine tumor, G1; (see comment). Chronic active gastritis with intestinal metaplasia, surface erosion   and reactive foveolar hyperplasia; no epithelial dysplasia identified. No H. pylori-type organisms identified on H&E and IHC stained sections. - Oncology following  - Nephrology consulted for worsening kidney function   - Palliative consulted for goals of care  - Supportive care per hospitalist   - GI following               * * *FINAL PATHOLOGIC DIAGNOSIS* * *     1. Stomach, body; biopsy:        Well-differentiated neuroendocrine tumor, G1; (see comment). Chronic active gastritis with intestinal metaplasia; no epithelial   dysplasia noted. No H. pylori-type organisms identified on H&E and IHC stained sections. 2. Stomach, polyp; polypectomy:        Well-differentiated neuroendocrine tumor, G1; (see comment). Chronic active gastritis with intestinal metaplasia, surface erosion   and reactive foveolar hyperplasia; no epithelial dysplasia identified. No H. pylori-type organisms identified on H&E and IHC stained sections.                Indications:   See Preoperative Diagnosis above  Referring Physician: Ricardo Moon MD  Anesthesia/Sedation: MAC anesthesia Propofol  Endoscopist:  Dr. Arabella Soliman  Assistant:  Endoscopy Technician-1: Heriberto Diana IV  Endoscopy RN-1: Kelli Hernandez RN  Preoperative diagnosis: LVAD Work up  Postoperative diagnosis: Gastric erythema  Gastric polyp     Scopes 01/18/2023 Findings:  Rectum: normal  Sigmoid: normal  Descending Colon: normal  Transverse Colon: normal  Ascending Colon: normal  Cecum: normal     Specimens:    none     EBL: None     Complications: None; patient tolerated the procedure well. Recommendations:      - Continue supportive care, cardiac diet     Signed By: Gaby Kamara MD                        January 18, 2023      Esophagogastroduodenoscopy (EGD) Procedure Note 01/18/2023      Postoperative diagnosis: Gastric erythema  Gastric polyp     Findings:  Esophagus:normal  Stomach:Patchy erythema gastric body, biopsies done. 6 mm sessile polyp gastric body biopsied  Duodenum/jejunum:normal        Therapies:  none     Specimens: gastric body bx, gastric polyp bx           EBL: None     Complications:   None; patient tolerated the procedure well. Impression:    See Postoperative diagnosis above     Recommendations:  -Await pathology. , -Cardiac diet         Nutrition 01/30/2023 Nutrition Recommendations/Plan:   Continue with Regular diet to promote PO intakes  Will order vanilla Ensure High Protein BID         Malnutrition Assessment:  Malnutrition Status: Moderate malnutrition (01/30/23 1232)    Context:  Acute illness     Findings of the 6 clinical characteristics of malnutrition:   Energy Intake:  75% or less of est energy req for 7 or more days  Weight Loss:  5% over one month     Body Fat Loss:  Mild body fat loss, Buccal region   Muscle Mass Loss:  Mild muscle mass loss, Clavicles (pectoralis & deltoids), Thigh (quadriceps)  Fluid Accumulation:  Mild, Extremities   Strength:  Not performed       Palliative care 01/31/2023                PLAN:   Prior to visit completed extensive chart review , including review of mars, vitals, labs, imaging and documentation from current hospitalization and prior hospitalization.   I also discussed with patient's nurse Sukhdev León, attending physician Dr. Sushma Rahman and Mr Orion Rodriguez' s cardiologist Dr. Raghu jasso/ the advanced heart failure team.     I met with Mr. Kendall, his wife was at bedside. .  Mr. Kendall was sitting up in bedside chair, sleeping, though he woke easily. MrJustino and Mrs. Kendall recalled meeting me during prior hospitalization, they had been expecting my visit. Mrs. Kendall called her son Lianne Pascal and daughter Shaista Shields, had them participate via speaker phone. 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 Patient and his Family are aware that determination of his LVAD candidacy is still pending, but while waiting for a definitve answer, they have asked to begin End of Life discussions to prepare both Mr. Kendall and family for the possibility of either Mr. Kendall refusing LVAD or should he be found ineligible. Mr. Orion Rodriguez shared that he is uncertain if he even wants the LVAD anymore. He describes his journey over the past year as a series of procedures, that nothing seemed to fix the problem, that he has continued to have setbacks, and has gotten weaker and more fatigued over time. He also stated that he has spent a lot of time in the hospital, that he has to wait for someone to help him and he just doesn't know if he could handle being hospitalized x a few weeks again. We discussed possibility of Mr. Kendall being discharged home on with IV dobutamine, with goal of being weaned off with the support of Hospice. Talked about use of comfort medications, usually with opioid,  to mange distressing symptoms, specifically SOB, but goal would be comfort, not to return to hospital, would expect he would pass at home or in [facility] if needed higher level of care due to uncontrolled symptoms/. [address]- Continue with current level of care. This was information session only.  Mr. Orion Rodriguez is considering all options, while waiting  get more information back from Oncology and heart failure team in coming days. I gave Mrs. Kendall Palliative business card, encouraged her to call if any questions or concerns. I will not be here the rest of the week, but will be back on Monday. Our team will chart check the remainder of the week. If Palliative should FU, please contact our [address] Thank you for including the palliative team, we appreciate the opportunity to be of service to Mr. Kendall and his family. Initial consult note routed to primary continuity provider and/or primary health care team members  Communicated plan of care with: Palliative IDT, [facility] Team, Irma RN, Jose Guadalupe David NP, Dr. Jamal Lal. Adv. Heart Failure LCSW Dian Ganser       GOALS OF CARE / TREATMENT PREFERENCES:      GOALS OF CARE: Undecided     TREATMENT PREFERENCES:   Code Status: Full Code     Patient and family's personal goals include: Undecided at this time     Important upcoming milestones or family events: did not address     The patient identifies the following as important for living well:         Dental  02/01/23 CT Maxillofacial:    IMPRESSION  No evidence of dental abscess. No evidence of sinusitis. No acute abnormalities. As Indicated:       Nephrology 02/02 01/06/23 Plan/Recommendations:  IV Albumin 25gm x1 dose today  Holding IV Lasix  Holding KCl  Continuous Primacor drip  IV Abx finished  BRIGHT  Appreciate oncology involvement/insight  Palliative care involvement also appreciated  Strict I/Os  Am labs      Plan:  Continue to hold IV diuretics  Monitor weight and edema.   Will resume diuretics orally, if he starts to develop symptoms of CHF   continue IV dobutamine per cardiology/CHF team.  The dose has been slowly down titrated  continue OFF vit D; encourage increase mobility as possible  Being worked up and considered for possible LVAD  Vasquez reinserted and to be left in for now  Continue Flomax  Will need outpatient urology follow-up for voiding trial     Discussed with patient and Dr. Cm Araiza     Will sign off. Please call back with questions or concerns     Dr. Leeanna John    Hematology/onc  02/01    Hematology/Oncology Treatment History:   1/18/23 EGD: Path of stomach biopsy with well-differentiated neuroendocrine tumor, G1; chronic active gastritis with intestinal metaplasia; no dysplasia. Stomach polyp with well-differentiated neuroendocrine tumor, G1; chronic active gastritis with intestinal metaplasia, and reactive hyperplasia; no dysplasia. Ki67 low (2%). Assessment:      #) Gastric well-differentiated neuroendocrine tumor, G1:   Incidentally noted on EGD from 1/18/23 as part of pre-LVAD workup. EGD with area of patchy erythema in gastric body and 6 mm gastric body polyp. Notably, this has been present since 6/2018 when he had EGD with biopsy of stomach that showed \"Atrophic gastritis with neuroendocrine hyperplasia. \"  This provides further support that this has been extremely indolent/slow-growing over the last five years. Non-contrast cross sectional imaging does not show any evidence of metastatic disease. Ideally, we would obtain a dotatate PET-CT to evaluate for metastatic spread; however this can only be obtained as outpatient and patient will remain in-house until decision about his LVAD. As an alternative, would recommend multiphase CT of the abdomen and pelvis to evaluate for metastatic spread. This is currently on hold. Gastrin level returned elevated. 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 These can frequently be treated with endoscopic resection alone.    Our patient has low Ki67, grade 1, and well differentiated, which are all good prognostic features, and his tumor has likely been very slow growing over the last 5 years (arising from area of neuro-endocrine hyperplasia seen in 6/2018). It is worth noting that asymptomatic metastatic NETs with low tumor burden are frequently treated with observation alone versus somatostatin analogues (e.g. ocretotide). Somatostatin analogues do not carry the same toxicty profile as chemotherapy and are generally very well tolerated, although can cause injection site pain, fatigue, diarrhea, arthralgias, abdominal discomfort, biliary tract disease (gallstones with long-term use), among other side effects. Even in the event that this represented a metastatic NET, the 5-year overall survival for all gastric NET is ~60% and the cancer-specific survival approaches ~75-80%. Given its well-differentiated pathology with low grade/Ki67, this number is likely even higher (if excluding his other co-morbidities). Thus, based on the currently available information, even without staging imaging, I believe the patient's 2-year survival from his NET to be >80%  and thus this should preclude him from being considered for VAD. Plan:      Continue Protonix daily   Case discussed with primary medicine team  Patient's 2-year survival from NET alone is expected to be >80%. From oncologic perspective, this should not preclude him from being considered for VAD. Defer ultimate decision to AHF/VAD team  If patient pursues VAD and further therapy, then would proceed with additional staging (e.g. multiphase CT in house or dotate scan as outpatient)  Patient and family updated in the room and via phone. Oncology will follow along peripherally. Please reach out if further questions. Signed By: Cathi Galvin MD        Infectious diseases       Pulmonology       Endocrinology       GYN exam       Wound care  01/30/23 01/19/23 Assessment:   Sitting on side of bed with PT. Conversational with family in room. left heel intact and without erythema.       1. POA right heel deep tissue injury  3 x 3 x 0 cm  100% non-blanching purple; no blistering or skin sliding or induration or fluctuance     Wound Recommendations:    Continue venelex as ordered twice daily to heels      Assessment:   Conversational and sitting up in chair. 1. POA right heel deep tissue injury  2.7 x 2.7 x 0 cm  100% non-blanching purple; greatly unchanged but with one small split on edge  Tx: betadine applied     Wound Recommendations:    Discontinue venelex and swab with betadine daily. Allow to air dry. NO DRESSING. Urology  01/26 01/13/23 (OUTPATIENT): passed void trial 01/26- removed donnelly            Assessment/Plan:   1. Gross hematuria - resolved. Okay to resume AC  2. Retention - Maintain donnelly through discharge. Flomax and Finasteride on board. Intermittent borderline hypotension noted, consider discontinuation of Flomax if remains an issue. OP follow up with VU arranged 1/26 with Dr Kieran Tejada at 6835     Thank you for this consult. Please contact [facility] with any further questions/concerns. Supervising MD, Dr. Juanito Bateman By: Kristie Flores NP - January 13, 2023    [address] [place] [address]       Ophthalmology       Sleep medicine    to be done outpatient     Frailty test       [facility]  01/04/2023    MOCA from 1/4/22 17/30, consistent with mild cognitive impairment   VAD education   Ongoing   Tx education   Deferred      Psychosocial and financial evaluation:  Date: 01/12/23   : Impressions/Barriers:         Psychosocial Recommendations:  Pt. Has Medicare and Medicaid, no current financial concerns, no current MH concerns, and no history of S/I. Pt. MoCA was 17/30, but pt. Was missing one hearing aide which may be a mitigating factor. Pt. Currently uses alcohol twice weekly reduced from 4x/wk. SW would suggest 3/mon screening-PET if possible. No recent tobacco use. Pt. Has limited social support in the area, but pt. Wife resides with him and will e present 24/7 post surgery.  She reports existing mobility and back issues. SW would recommend education about the specifics of being a caregiver for an LVAD patient. Potentially allow pt. Wife to speak to existing caregiver. Provide application/resources for reduced cost hearing aide and any other basic resources in addition to existing state benefits. Wife also reports 15 steps to bedroom in home and has concerns abt. Pt. Safety post-VAD in the living space. SW suggested discussing with OT/PT. Geno Joe, BOB, LCSW Supervisee. [facility], [address]  Phone: [phone]  Fax: [phone]      : Insurance coverage:  Transplant sites in network:     Patient name: Tanner Morrell  Patient :  1950  Patient MRN:  [id number]  Date of service: 23     QUESTIONNAIRE:  Do you have medical insurance? yes  What is your primary insurance? medicaid  What is your secondary insurance? Medicare, [employer]  What is your coverage for medications? Medicaid  What is your insurance coverage for medical supplies? Dual Medi/Medi  Closest heart transplant facilities in net-work:  Are you currently employed? Retired. And wife is retired. 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 Pt. Wife has SSDI  Do you currently have any concerns with being able to cover any health-related expenses? No  Do you currently have any concerns about access to the following resources: food, cost of housing, electric bills, telephone bills/services, medication costs, insurance premium or copays, transportation or other. Budget is tight. No outstanding bills. Recommendations: RECOMMENDATIONS: explore grants for financial aide for living expenses. They receive food stamps,  emergency heating bills, and Medicaid.        Elton Butcher RN   VAD Coordinator  [facility] [address], [address]  Phone: [phone]  Fax: [phone]

## 2023-02-02 NOTE — PROGRESS NOTES
1930  Verbal bedside report given to Chayo Mon, RN oncoming nurse by [facility]. Valerio Rose RN off-going nurse. Report included current pt status and condition, recent results, hx of present illness, heart rate and rhythm (NSR), and respiratory status. 1700  Called for specialty bed with air mattress to relieve pressure and help treat pt's pain. 1630  BG of 415 messaged attending, orders received    1630  Received report from LEE Garduno, off-going RN. Greeted pt.

## 2023-02-02 NOTE — PROGRESS NOTES
[facility]  Hospitalist Group                                                                                          Hospitalist Progress Note  Aaron Hernandez MD  Answering service: [phone] OR [phone] from in house phone        Date of Service:  2023  NAME:  Eloisa August  :  1950  MRN:  [id number]      Admission Summary:     Patient presents with decompensated congestive heart failure. Interval history / Subjective:     Complaining of some pain in the right heel where he has wounds as well as pain in the back.      Assessment & Plan:     Congestive heart failure  -Acute on chronic systolic CHF, NYHA class IV on admission  -Patient with known history of ischemic cardiomyopathy, echo this admission shows EF of 20%  -On milrinone for inotrope assisted diuresis, Lasix currently on hold due to elevated creatinine  -On Toprol, cannot tolerate ACE or ARB due to hypotension, cannot tolerate Aldactone due to hyperkalemia  -AHF team following, ongoing work-up for LVAD    A flutter with RVR  -Now off of amnio drip, on p.o. amiodarone plan for 400 mg twice daily for 14 days then 400 mg daily  -Cardiology and EP following, patient was recommended for AV node ablation and BiV ICD    Community-acquired pneumonia  -Chest x-ray on admission shows diffuse focal opacities concerning for pneumonia  -Blood cultures so far negative, COVID-negative, flu negative, mycoplasma and Legionella negative  -Completed antibiotics    TANNER  -TANNER on CKD stage III, suspect cardiorenal effect  -Patient with hyperkalemia, has received Lokelma  -Currently holding Lasix due to TANNER  -Nephrology following    Well differentiated neuroendocrine tumor  -Patient is s/p EGD and colonoscopy as part of LVAD work-up and biopsy of the polyp in the stomach stomach came back as neuroendocrine tumor  -Oncology evaluating, to proceed with further imaging for staging, pending decision for LVAD placement  -Per oncology, patient with excellent prognosis    Left upper extremity DVT  -Venous Doppler showed thrombus in the left forearm basilic vein extending to distal upper arm basilic vein also thrombus in one of the paired left radial veins  -On anticoagulation with Eliquis    Wound on the left heel and back  -Wound care consult    Hypertension  -Continue Toprol  -Monitor blood pressure    Diabetes type 2  -Continue Lantus along with Premeal Humalog and insulin sliding scale coverage    Dyslipidemia  -Continue statin    GERD  -Continue PPI    History of PAD  -On aspirin    BPH with urinary retention  -Patient to remain with Vasquez catheter  -Urology previously evaluated the patient     Code status: Full  Prophylaxis: [address] discussed with: Patient and patient's wife present at bedside and patient's son over the phone. Anticipated Disposition: 24 to 48 hours    CRITICAL CARE ATTESTATION:  I had a face to face encounter with the patient, reviewed and interpreted patient data including clinical events, labs, images, vital signs, I/O's, and examined patient. I have discussed the case and the plan and management of the patient's care with the consulting services, the bedside nurses and necessary ancillary providers. NOTE OF PERSONAL INVOLVEMENT IN CARE   This patient has a high probability of imminent, clinically significant deterioration, which requires the highest level of preparedness to intervene urgently. I participated in the decision-making and personally managed or directed the management of the following life and organ supporting interventions that required my frequent assessment to treat or prevent imminent deterioration. I personally spent 45 minutes of critical care time. This is time spent at this critically ill patient's bedside actively involved in patient care as well as the coordination of care and discussions with the patient's family.   This does not include any procedural time which has been billed separately. Hospital Problems  Date Reviewed: 1/24/2023            Codes Class Noted POA    CHF (congestive heart failure) ([facility] [address] ICD-10-CM: I50.9  ICD-9-CM: 428.0  12/12/2022 Unknown               Review of Systems:   A comprehensive review of systems was negative except for that written in the HPI. Vital Signs:    Last 24hrs VS reviewed since prior progress note. Most recent are:  Visit Vitals  BP (!) 99/37   Pulse 83   Temp 98 °F (36.7 °C)   Resp 19   Ht 5' 9\" (1.753 m)   Wt 55.6 kg (122 lb 9.2 oz)   SpO2 98%   BMI 18.10 kg/m²         Intake/Output Summary (Last 24 hours) at 2/2/2023 1009  Last data filed at 2/2/2023 8943  Gross per 24 hour   Intake 1251.2 ml   Output 725 ml   Net 526.2 ml        Physical Examination:     I had a face to face encounter with this patient and independently examined them on 2/2/2023 as outlined below:          Constitutional:  No acute distress, cooperative, pleasant    ENT:  Oral mucosa moist, oropharynx benign. Resp:  CTA bilaterally. No wheezing/rhonchi/rales. No accessory muscle use. CV:  Regular rhythm, normal rate, no murmurs, gallops, rubs    GI:  Soft, non distended, non tender. normoactive bowel sounds, no hepatosplenomegaly     Musculoskeletal:  No edema, warm, 2+ pulses throughout    Neurologic:  Moves all extremities. AAOx3, CN II-XII reviewed            Data Review:    Review and/or order of clinical lab test    I have independently reviewed and interpreted patient's lab and all other diagnostic data    Notes reviewed from all clinical/nonclinical/nursing services involved in patient's clinical care. Care coordination discussions were held with appropriate clinical/nonclinical/ nursing providers based on care coordination needs.      Labs:     Recent Labs     02/02/23 0141 02/01/23 0136   WBC 6.8 6.3   HGB 8.3* 8.0*   HCT 28.7* 27.7*    271     Recent Labs     02/02/23 0141 02/01/23 0136 01/31/23  0323   * 136 131*   K 4.9 4.9 5.4*  106 102   CO2 24 23 22   BUN 40* 33* 34*   CREA 2.01* 2.08* 2.09*   * 94 221*   CA 9.1 9.0 9.1   MG 1.9  --  1.7     Recent Labs     01/31/23  0323   ALT 16      TBILI 0.5   TP 6.9   ALB 3.1*   GLOB 3.8     No results for input(s): INR, PTP, APTT, INREXT in the last 72 hours. No results for input(s): FE, TIBC, PSAT, FERR in the last 72 hours. Lab Results   Component Value Date/Time    Folate 10.5 07/03/2018 09:24 AM      No results for input(s): PH, PCO2, PO2 in the last 72 hours. No results for input(s): CPK, CKNDX, TROIQ in the last 72 hours.     No lab exists for component: CPKMB  Lab Results   Component Value Date/Time    Cholesterol, total 170 01/12/2023 05:00 AM    HDL Cholesterol 40 01/12/2023 05:00 AM    LDL, calculated 87 01/12/2023 05:00 AM    Triglyceride 215 (H) 01/12/2023 05:00 AM    CHOL/HDL Ratio 4.3 01/12/2023 05:00 AM     Lab Results   Component Value Date/Time    Glucose (POC) 176 (H) 02/02/2023 07:04 AM    Glucose (POC) 209 (H) 02/01/2023 09:17 PM    Glucose (POC) 341 (H) 02/01/2023 04:04 PM    Glucose (POC) 257 (H) 02/01/2023 11:22 AM    Glucose (POC) 161 (H) 02/01/2023 06:25 AM     Lab Results   Component Value Date/Time    Color YELLOW/STRAW 01/28/2023 12:11 PM    Appearance HAZY (A) 01/28/2023 12:11 PM    Specific gravity 1.010 01/28/2023 12:11 PM    Specific gravity 1.013 01/01/2023 09:13 AM    pH (UA) 5.5 01/28/2023 12:11 PM    Protein TRACE (A) 01/28/2023 12:11 PM    Glucose 100 (A) 01/28/2023 12:11 PM    Ketone Negative 01/28/2023 12:11 PM    Bilirubin Negative 01/28/2023 12:11 PM    Urobilinogen 1.0 01/28/2023 12:11 PM    Nitrites Negative 01/28/2023 12:11 PM    Leukocyte Esterase MODERATE (A) 01/28/2023 12:11 PM    Epithelial cells FEW 01/28/2023 12:11 PM    Bacteria Negative 01/28/2023 12:11 PM    WBC 5-10 01/28/2023 12:11 PM    RBC  01/28/2023 12:11 PM         Medications Reviewed:     Current Facility-Administered Medications   Medication Dose Route Frequency    epoetin heidy-epbx (RETACRIT) injection 10,000 Units  10,000 Units SubCUTAneous Q7D    traZODone (DESYREL) tablet 50 mg  50 mg Oral QHS PRN    insulin lispro (HUMALOG) injection 4 Units  4 Units SubCUTAneous TID WITH MEALS    insulin glargine (LANTUS) injection 10 Units  10 Units SubCUTAneous DAILY    apixaban (ELIQUIS) tablet 10 mg  10 mg Oral BID    Followed by    Akiko Ellis ON 2/6/2023] apixaban (ELIQUIS) tablet 5 mg  5 mg Oral BID    [Held by provider] potassium chloride SR (KLOR-CON 10) tablet 20 mEq  20 mEq Oral BID    glucose chewable tablet 16 g  4 Tablet Oral PRN    glucagon (GLUCAGEN) injection 1 mg  1 mg IntraMUSCular PRN    dextrose 10 % infusion 0-250 mL  0-250 mL IntraVENous PRN    insulin lispro (HUMALOG) injection   SubCUTAneous AC&HS    balsam peru-castor oiL (VENELEX) ointment   Topical BID    metoprolol succinate (TOPROL-XL) XL tablet 12.5 mg  12.5 mg Oral Q12H    lidocaine 4 % patch 1 Patch  1 Patch TransDERmal Q24H    amiodarone (CORDARONE) tablet 400 mg  400 mg Oral BID    aspirin delayed-release tablet 81 mg  81 mg Oral DAILY    sodium chloride (NS) flush 5-40 mL  5-40 mL IntraVENous Q8H    sodium chloride (NS) flush 5-40 mL  5-40 mL IntraVENous PRN    acetaminophen (TYLENOL) tablet 650 mg  650 mg Oral Q6H PRN    Or    acetaminophen (TYLENOL) suppository 650 mg  650 mg Rectal Q6H PRN    polyethylene glycol (MIRALAX) packet 17 g  17 g Oral DAILY PRN    ondansetron (ZOFRAN ODT) tablet 4 mg  4 mg Oral Q8H PRN    Or    ondansetron (ZOFRAN) injection 4 mg  4 mg IntraVENous Q6H PRN    [Held by provider] furosemide (LASIX) injection 40 mg  40 mg IntraVENous BID    heparin (porcine) pf 500 Units  500 Units InterCATHeter PRN    pantoprazole (PROTONIX) tablet 40 mg  40 mg Oral ACB    rosuvastatin (CRESTOR) tablet 20 mg  20 mg Oral QHS    milrinone (PRIMACOR) 20 MG/100 ML D5W infusion  0.2 mcg/kg/min IntraVENous CONTINUOUS    heparin (porcine) pf 500 Units  500 Units InterCATHeter DAILY ______________________________________________________________________  EXPECTED LENGTH OF STAY: 4d 2h  ACTUAL LENGTH OF STAY:          7                 Jennifer Giron MD

## 2023-02-02 NOTE — PROGRESS NOTES
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 During LVAD eval, found to have well-differentiated neuroendocrine tumor on gastric body and gastric polyp, G1; with GI, Oncology and Palliative following. Per Oncology- this may be a very treatable tumor with good prognosis and thus would not preclude LVAD. No absolute contraindications to LVAD at this point, now with good prognosis from Oncology (see their note for details); continuing with LVAD eval, will be presented at [facility] tomorrow for decision on LVAD. Discussed today with pt, his wife and his children on by phone.           RECOMMENDATIONS:  Continue milrinone  0.2mcg/kg/min unable to uptitrate due to HR and BP  Continue toprol XL 12.5mg BID  Cannot tolerate ARB/ARNi due to hypotension  BP responded well after holding flomax  Cannot tolerate spironolactone due to hyperkalemia  Cannot tolerate jardiance due to significant diuresis on smallest dose/contributed to IVVD  Discontinued corlanor due to SVT  Digoxin stopped d/t risk for toxicity with amio loading; monitoring level  Continue amiodarone, appreciate EP cardiology recs  Hold lasix and potassium; montior renal function  Appreciate nephrology consult; will give albumin today  Keep K+ >4 and Mg >2  Abx/treatment of suspected PNA per hospitalist  Continue lifevest  Continue baby aspirin   Plavix previously stopped, okayed by Dr. Wilkins Heart consult pending (high risk for TRISTAN)  VAD evaluation in progress, will need PFT/DLCO- discussed with bedside RN     All other care per primary team, hopefully home end of the week      INTERVAL HISTORY:  NAEO  Urine cx neg  Blood cx neg  Cr holding around 2  Net +789mL last 24 hrs  Pro BNP stable  Pt ambulating around unit, poor appetite, no acute complaints       IMPRESSION:  Acute on chronic combined systolic/diastolic  heart failure  Stage D, NYHA class IIIB/IV symptoms   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, LVEF 25-30% and 23% (by cMRI 1/3/23)  Cardiac MRI suggestive of ischemic cardiomyopathy  PYP equivocal  Chronic milrinone infusion as palliation   Coronary artery disease  CAD s/p CABG x 2: further disease best managed medically due to small vessel size   At risk of sudden cardiac death  Peripheral arterial disease  Bilateral hydronephrosis s/p donnelly  Cardiac risk factors:  HTN  HL  TRISTAN, STOP-BANG 4  DM2  CKD, stage 3  MOCA from 1/4/22 17/30, consistent with mild cognitive impairment  Hard of hearing  Gastric Neuroendocrine Tumor  Sepsis, unclear source         LIFE GOALS:  Lifestyle goals reviewed with the patient. Patient's personal goals include: TBD  Important upcoming milestones or family events: TBD  The patient identifies the following as important for living well: TBD                CARDIAC IMAGING:  Echo 1/9/23   Left Ventricle: Severely reduced left ventricular systolic function with a visually estimated EF of 25 - 30%. Left ventricle size is normal. Mildly increased wall thickness.      Echo 12/26/22    Left Ventricle: Severely reduced left ventricular systolic function with a visually estimated EF of 25 - 30%. Left ventricle size is normal. Normal wall thickness. There are regional wall motion abnormalities. Grade II diastolic dysfunction with increased LAP. Right Ventricle: Moderately reduced systolic function. TAPSE is abnormal. TAPSE is 1.1 cm. Aortic Valve: Mild stenosis of the aortic valve. AV peak gradient is 13 mmHg. AV peak velocity is 1.8 m/s. Mitral Valve: Not well visualized. Moderate annular calcification at the posterior leaflet of the mitral valve. Mild to moderate regurgitation. Tricuspid Valve: Mildly elevated RVSP. Left Atrium: Left atrium is moderately dilated. 12/8/22    Left Ventricle: Moderately reduced left ventricular systolic function with a visually estimated EF of 35 - 40%. Severe hypokinesis of the following segments: mid anteroseptal, apical anterior, apical septal, apical inferior and apical lateral. Severe hypokinesis of the apex. Mitral Valve: Severely thickened leaflet, at the anterior and posterior leaflets. Severely calcified leaflet, at the anterior and posterior leaflets. Mild annular calcification of the mitral valve. Moderate regurgitation. Left Atrium: Left atrium is mildly dilated. Contrast used: Definity. limited study     EKG 12/22/22 ST, Biatria enlargement, marked ST abnormality     C 12/6/22  1. Normal LVEDP  2. Severe native multivessel coronary artery disease  3. Patent LIMA to LAD and vein graft to distal RCA  4. Recurrent ISR in OM1 stent with now 60 to 70% restenosis  5. Recoil of left main and circumflex stent with now recurrent 40 to 50% stenosis. 6.  Progression of ostial left main disease now to about 60% stenosis  7. Progression of disease in jailed first marginal branch now with diffuse 90% stenosis  8.   High-grade stenosis in the mid to distal right potential femoral artery treated with 6 x 40 mm impact drug-coated balloon angioplasty to reduce the stenosis to less than 40%     NST        HEMODYNAMICS:  RHC 1/9/23  PA 20/9, RA 3, PCWP 8, CI 1.8     CPEST too ill   6MW 300 feet       HPI:  71 y.o. male who was admitted via ED for worsening SOB, poor appetite, orthopnea and fatigue. Pmhx of CAD s/p CABG, PAD, DM 2, recent admission for HFmrEF earlier this month. Serial TTEs show progressive decline in EF since 3/2021 with the most recent EF at 25-30%. Recent LHC shows diffuse CAD and no interventions indicated. Patient had [facility] [address] recently and there is some thought to myocarditis or other etiology causing worsening HF. The [facility] was consulted for further evaluation and management of HFrEF. REVIEW OF SYSTEMS:  Review of Systems   Constitutional:  Positive for malaise/fatigue. Negative for chills and fever. Respiratory:  Positive for shortness of breath. Negative for cough. Cardiovascular:  Negative for chest pain, palpitations, orthopnea and leg swelling. Gastrointestinal:  Negative for abdominal pain, heartburn, nausea and vomiting. Genitourinary:         Poor appetite   Neurological:  Positive for weakness. Negative for dizziness. PHYSICAL EXAM:  Visit Vitals  BP (!) 99/37   Pulse 88   Temp 98 °F (36.7 °C)   Resp 19   Ht 5' 9\" (1.753 m)   Wt 122 lb 9.2 oz (55.6 kg)   SpO2 98%   BMI 18.10 kg/m²     Physical Exam  Vitals and nursing note reviewed. Constitutional:       General: He is not in acute distress. Appearance: Normal appearance. He is ill-appearing. Cardiovascular:      Rate and Rhythm: Normal rate and regular rhythm. Heart sounds: Normal heart sounds. No murmur heard. No friction rub. No gallop. Pulmonary:      Effort: Pulmonary effort is normal. No respiratory distress. Breath sounds: Normal breath sounds. Abdominal:      General: Bowel sounds are normal. There is no distension. Palpations: Abdomen is soft. Tenderness: There is no abdominal tenderness.    Musculoskeletal:      Right lower le+ Pitting Edema present. Left lower le+ Pitting Edema present. Skin:     General: Skin is warm and dry. Capillary Refill: Capillary refill takes less than 2 seconds. Neurological:      General: No focal deficit present. Mental Status: He is alert and oriented to person, place, and time. Psychiatric:         Mood and Affect: Mood normal.         Behavior: Behavior normal.         Thought Content: Thought content normal.         Judgment: Judgment normal.            PAST MEDICAL HISTORY:  Past Medical History:   Diagnosis Date    CAD (coronary artery disease) 11/10/2016    NSTEMI & 2 stents    Deafness 10/28/2012    DM (diabetes mellitus) ([facility]     Elevated cholesterol     Hypertension     NSTEMI (non-ST elevated myocardial infarction) ([facility] [address] 11/10/2016       PAST SURGICAL HISTORY:  Past Surgical History:   Procedure Laterality Date    COLONOSCOPY N/A 2018    COLONOSCOPY performed by Adry Montgomery MD at [facility] ENDOSCOPY    COLONOSCOPY N/A 2023    COLONOSCOPY performed by Wen Jones MD at [facility] ENDOSCOPY    [address]  2016    2 stents       FAMILY HISTORY:  Family History   Problem Relation Age of Onset    Heart Disease Father     Heart Attack Father     Hypertension Mother     Elevated Lipids Brother     Elevated Lipids Brother     No Known Problems Sister     Elevated Lipids Brother     No Known Problems Son     No Known Problems Daughter     Anesth Problems Neg Hx        SOCIAL HISTORY:  Social History     Socioeconomic History    Marital status:    Tobacco Use    Smoking status: Never     Passive exposure: Never    Smokeless tobacco: Never   Vaping Use    Vaping Use: Never used   Substance and Sexual Activity    Alcohol use:  Yes     Alcohol/week: 2.0 standard drinks     Types: 1 Cans of beer, 1 Shots of liquor per week     Comment: rarely    Drug use: No    Sexual activity: Yes     Social Determinants of Health     Financial Resource Strain: Medium Risk    Difficulty of Paying Living Expenses: Somewhat hard   Food Insecurity: Food Insecurity Present    Worried About Running Out of Food in the Last Year: Never true    Ran Out of Food in the Last Year: Often true       LABORATORY RESULTS:     Labs Latest Ref Rng & Units 2/2/2023 2/1/2023 1/31/2023 1/30/2023 1/29/2023 1/28/2023 1/28/2023   WBC 4.1 - 11.1 K/uL 6.8 6.3 7.6 8.0 8.8 - 6.3   RBC 4.10 - 5.70 M/uL 3.32(L) 3.27(L) 3.66(L) 3.48(L) 3.52(L) - 3.24(L)   Hemoglobin 12.1 - 17.0 g/dL 8. 3(L) 8.0(L) 9.1(L) 8.5(L) 8.7(L) - 8. 0(L)   Hematocrit 36.6 - 50.3 % 28. 7(L) 27. 7(L) 30. 6(L) 29. 5(L) 29. 3(L) - 26. 5(L)   MCV 80.0 - 99.0 FL 86.4 84.7 83.6 84.8 83.2 - 81.8   Platelets 733 - 273 K/uL 304 271 307 297 270 - 217   Lymphocytes 12 - 49 % 20 21 20 23 33 - 27   Monocytes 5 - 13 % 10 10 9 10 10 - 11   Eosinophils 0 - 7 % 2 3 3 1 3 - 3   Basophils 0 - 1 % 1 1 1 1 1 - 1   Albumin 3.5 - 5.0 g/dL - - 3. 1(L) - - - -   Calcium 8.5 - 10.1 MG/DL 9.1 9.0 9.1 9.0 8.8 8.4(L) 8.7   Glucose 65 - 100 mg/dL 142(H) 94 221(H) 261(H) 280(H) 380(H) 289(H)   BUN 6 - 20 MG/DL 40(H) 33(H) 34(H) 27(H) 24(H) 23(H) 17   Creatinine 0.70 - 1.30 MG/DL 2.01(H) 2.08(H) 2.09(H) 1.88(H) 1.78(H) 1.58(H) 1.48(H)   Sodium 136 - 145 mmol/L 135(L) 136 131(L) 131(L) 132(L) 132(L) 132(L)   Potassium 3.5 - 5.1 mmol/L 4.9 4.9 5.4(H) 4.8 4.4 4.3 3.3(L)   TSH 0.36 - 3.74 uIU/mL - - - - - - 3.52   PSA 0.01 - 4.0 ng/mL - - - - - - -   LDH 85 - 241 U/L - - - - - - -   Some recent data might be hidden     Lab Results   Component Value Date/Time    TSH 3.52 01/28/2023 05:26 AM    TSH 2.12 12/27/2022 02:36 PM    TSH 4.80 (H) 12/06/2022 03:53 AM    TSH 5.39 (H) 10/12/2022 09:10 AM    TSH 3.53 02/03/2022 11:47 AM    TSH 5.790 (H) 11/21/2019 04:45 PM    TSH 3.08 06/22/2018 01:53 PM    TSH 4.250 05/26/2015 09:43 AM       ALLERGY:  No Known Allergies     CURRENT MEDICATIONS:    Current Facility-Administered Medications: epoetin heidy-epbx (RETACRIT) injection 10,000 Units, 10,000 Units, SubCUTAneous, Q7D, Jadiel Galvan MD    traZODone (DESYREL) tablet 50 mg, 50 mg, Oral, QHS PRN, Keely Rainey MD, 50 mg at 02/01/23 2205    insulin lispro (HUMALOG) injection 4 Units, 4 Units, SubCUTAneous, TID WITH MEALS, Cathy Sheldon CNS, 4 Units at 02/01/23 1723    insulin glargine (LANTUS) injection 10 Units, 10 Units, SubCUTAneous, DAILY, Keely Rainey MD, 10 Units at 02/02/23 0830    apixaban (ELIQUIS) tablet 10 mg, 10 mg, Oral, BID, 10 mg at 02/02/23 0831 **FOLLOWED BY** [START ON 2/6/2023] apixaban (ELIQUIS) tablet 5 mg, 5 mg, Oral, BID, Keely Rainey MD    [Held by provider] potassium chloride SR (KLOR-CON 10) tablet 20 mEq, 20 mEq, Oral, BID, Lane STALEY, NP, 20 mEq at 01/30/23 1030    glucose chewable tablet 16 g, 4 Tablet, Oral, PRN, Ita Harvey NP    glucagon (GLUCAGEN) injection 1 mg, 1 mg, IntraMUSCular, PRN, Ita Harvey NP    dextrose 10 % infusion 0-250 mL, 0-250 mL, IntraVENous, PRN, Ita Harvey NP    insulin lispro (HUMALOG) injection, , SubCUTAneous, AC&HS, Ita Harvey NP, 2 Units at 02/01/23 2205    balsam peru-castor oiL (VENELEX) ointment, , Topical, BID, Ita Harvey NP, Given at 02/02/23 0840    metoprolol succinate (TOPROL-XL) XL tablet 12.5 mg, 12.5 mg, Oral, Q12H, Junior Eda MD, 12.5 mg at 02/01/23 2205    lidocaine 4 % patch 1 Patch, 1 Patch, TransDERmal, Q24H, Keely Rainey MD, 1 Patch at 02/01/23 1225    amiodarone (CORDARONE) tablet 400 mg, 400 mg, Oral, BID, Orin King MD, 400 mg at 02/02/23 1236    aspirin delayed-release tablet 81 mg, 81 mg, Oral, DAILY, Heber Barajas MD, 81 mg at 02/02/23 0830    sodium chloride (NS) flush 5-40 mL, 5-40 mL, IntraVENous, Q8H, Heber Barajas MD, 10 mL at 02/02/23 0637    sodium chloride (NS) flush 5-40 mL, 5-40 mL, IntraVENous, PRN, Heber Barajas MD    acetaminophen (TYLENOL) tablet 650 mg, 650 mg, Oral, Q6H PRN, 650 mg at 02/02/23 0144 **OR** acetaminophen (TYLENOL) suppository 650 mg, 650 mg, Rectal, Q6H PRN, Heber Barajas MD    polyethylene glycol (MIRALAX) packet 17 g, 17 g, Oral, DAILY PRN, Heber Barajas MD    ondansetron (ZOFRAN ODT) tablet 4 mg, 4 mg, Oral, Q8H PRN, 4 mg at 01/30/23 1357 **OR** ondansetron (ZOFRAN) injection 4 mg, 4 mg, IntraVENous, Q6H PRN, Heber Barajas MD, 4 mg at 01/31/23 2035    [Held by provider] furosemide (LASIX) injection 40 mg, 40 mg, IntraVENous, BID, Heber Barajas MD, 40 mg at 01/30/23 0942    heparin (porcine) pf 500 Units, 500 Units, InterCATHeter, PRN, Heber Barajas MD    pantoprazole (PROTONIX) tablet 40 mg, 40 mg, Oral, ACB, Heber Barajas MD, 40 mg at 02/02/23 0636    rosuvastatin (CRESTOR) tablet 20 mg, 20 mg, Oral, QHS, Heber Barajas MD, 20 mg at 02/01/23 2205    milrinone (PRIMACOR) 20 MG/100 ML D5W infusion, 0.2 mcg/kg/min, IntraVENous, CONTINUOUS, Heber Barajas MD, Last Rate: 3.3 mL/hr at 02/02/23 0800, 0.2 mcg/kg/min at 02/02/23 0800    heparin (porcine) pf 500 Units, 500 Units, InterCATHeter, DAILY, Heber Barajas MD, 500 Units at 02/01/23 0902    PATIENT CARE TEAM:  Patient Care Team:  Peña Smith MD as PCP - General (Family Medicine)  Peña Smith MD as PCP - [facility], MD (Cardiovascular Disease Physician)  Lady Luz Elena MD (Gastroenterology)  Gianfranco Laureano MD (Cardiothoracic Surgery)  Renetta Lucas MD (Cardiovascular Disease Physician)  Ramesh Wong MD (Nephrology)  Vale Lloyd RN as Care Transitions Nurse  Guzman Walsh MD (Pulmonary Disease)  Adan Carrasco MD ([address] [place] [address] Vascular Surgery)     Thank you for allowing me to participate in this patient's care.     Margie Mullen NP   [address] [place] [address], [address]  Phone: [phone]

## 2023-02-02 NOTE — HOME HEALTH
Subjective: wife reports patient is managing his own medications  Falls since last visit NO(if yes complete the Fall Tracking Form and include bsrifallreport):   Caregiver involvement changes: no changes  Home health supplies by type and quantity ordered/delivered this visit include: supplies in the home    Clinician asked if patient has had any physician contact since last home care visit and patient states: YES  Clinician asked if patient has any new or changed medications and patient states:  NO   If Yes, were medications reconciled? N/A   Was the certifying physician notified of changes in medications? N/A     Clinical assessment (what this visit means for the patient overall and need for ongoing skilled care) and progress or lack of progress towards SPECIFIC goals: patient with CHF being worked up for LVAD. patient on milrinone infusion via YOSHI PICC line. PICC dressing changed and labs drawn. right heel DTI unchanged. patient/wife still cleansing with soap and water and applying betadine daily. patient stated he had to sleep with an extra pillow last night. educated that if this continues tonight that he needs to notify [facility] and this could be a sign of fluid overload. patient and wife verbalized understanding. patient continues with donnelly catheter. Has appointment with urology in the morning for a voiding trial.  VS within defined parameters, milrinone bag changed, PICC dressing changed, labs drawn, assessment completed.      Written Teaching Material Utilized: N/A    Interdisciplinary communication with: N/A     Discharge planning as follows: will continue to see indefinitely for PICC care while on milrinone    Specific plan for next visit: milrinone bag changed, follow up on voiding trial

## 2023-02-03 NOTE — PROCEDURES
Procedure Note - Central Venous Access:   Performed by Calin Gandhi DO  Diagnosis: RRT  Insertion Date: 02/03/23  Time:9:57 AM  Obtained Consent? yes; informed   Procedure Location:  ICU    Immediately prior to the procedure, the patient was reevaluated and found suitable for the planned procedure and any planned medications. Immediately prior to the procedure a time out was called to verify the correct patient, procedure, equipment, staff, and marking as appropriate. Central line Bundle:  Full sterile barrier precautions used. 7-Step Sterility Protocol followed. (cap, mask sterile gown, sterile gloves, large sterile sheet, hand hygiene, 2% chlorhexidine for cutaneous antisepsis)  5 mL 1% Lidocaine placed at insertion site. Patient positioned in Trendelenburg? Yes  The site was prepped with {Chlorhexidine  Catheter inserted into a new site. Using Seldinger technique a Arrow Cordis/Introducer, Pulmonary Artery Catheter was placed in the Right, Internal Jugular Vein via direct cannulation with 1 number of attempts. Ultrasound Guidance was utilized. There was good dark, non-pulsatile blood return in all ports. Catheter secured. Biopatch/CHG bio-occlusive dressing in place? Yes  The following complications were encountered: None. A follow-up chest x-ray is pending  The procedure was tolerated well. PAC secured at 55cm.  PC[facility] [address] [facility]

## 2023-02-03 NOTE — DIABETES MGMT
[facility]  DIABETES MANAGEMENT CONSULT    Consulted by  Key Bedoya MD   for advanced nursing evaluation and care for inpatient blood glucose management. Evaluation and Action Plan   Lauren Reina is a 71year old gentleman, with Type 2 Diabetes with a recent A1C of 7.7%, who is admitted with arrhythmias and acute on chronic HFrEF with failure to respond to inotrope support. He was discharged one week prior from a prolonged hospital stay for acute on chronic HF and LVAD work-up and discharged home on dobutamine with a lifevest.  Glucose control was tenuous during his previous admission s/t multiple co-morbidities, several tests/procedures requiring NPO status, waxing and waining oral intake. At this time glucose is impacted by:  Heart Failure with reduced EF 25-30%  Milrinone therapy  TANNER: GFR 35  Infection: UTI and PNA, IV antibiotics   Oral intake     Last admission, he required low basal doses but high bolus doses with meals to offset pre-prandial hyperglycemia. He is experiencing pre-prandial hyperglycemia now despite moderate dose bolus humalog resumed- increased to high dose. Basal insulin to stay as ordered as fasting BG have remained controlled. Individualized BG target is closer to 180mg/dl. Management Rationale Action Plan   Medication   Basal needs Using 0.2 units/kg/D Lantus 10 units daily as fasting BG at goal   Nutritional needs Using resistant sensitivity based on degree of pre-prandial hyperglycemia Continue 6 units Humalog/meal (high dose). Increased 2/2     Hold if patient is NPO or consumes less than 50% of carbohydrates on meal tray    Advance by 2 units daily for persistent   pre-prandial hyperglycemia   Corrective insulin Using normal sensitivity  Normal sensitivity ACHS     Referral [x] Inpatient nutrition services   Additional orders  Carb consistent diet.   Additional recommendations per RD: they were very helpful last admission with malnutrition, poor appetite, and hyperglycemia. Initial Presentation   Romain Wright is a 71 y.o. male who presented to the ED 1/26/23 with lower extremity swelling and difficulty breathing. He had a prolonged hospital admission from 12/22/22-1/19/23 for acute on chronic systolic HF and LVAD work-up. He was discharged with home inotrope. LAB: , GFR 58, Trop 2003, BNP 4524  CXR: New diffuse bilateral increased interstitial markings, partially obscuring bilateral pulmonary nodules. HX:   Past Medical History:   Diagnosis Date    CAD (coronary artery disease) 11/10/2016    NSTEMI & 2 stents    Deafness 10/28/2012    DM (diabetes mellitus) ([facility]     Elevated cholesterol     Hypertension     NSTEMI (non-ST elevated myocardial infarction) ([facility] 11/10/2016        INITIAL DX:   CHF (congestive heart failure) ([facility] [I50.9]     Current Treatment     TX: Milrinone, Amio. Specialty consultations: [facility], Cardiology, EP, GI     Hospital Course   Clinical progress has been complicated by:    0/28: Admission. Rapid response for SVT- Given adenosine x2, amio bolus/gtt  1/27: Advanced HFC consult. EP consult ordered. Intolerance of inotropic support. Cardiology consult. NSTEMI, heparin gtt started. Seen by EP: Continue amio. 1/28: Febrile: CT chest 1/28 shows diffuse focal opacities concerning for pneumonia. Obtain blood cultures, UA. Pathology report 1/18/23: well differentiated neuroendocrine tumor grade 1. EGD: Patchy erythema gastric body, biopsies done. 6 mm sessile polyp gastric body biopsied  1/30: Oncology consult: Recommend multiphase CT of the abdomen and pelvis to evaluate for metastatic spread. Tachycardia and SOB, BiPAP overnight.    Diabetes History   Type 2 Diabetes  Ambulatory BG management provided by: PCP Moni Soria MD    Diabetes-related Medical History  Acute complications  Acute hyperglycemia  Neurological complications  Peripheral neuropathy  Microvascular disease  Nephropathy  Macrovascular disease  CAD  Other associated conditions                                       CHF     Diabetes Medication History  Key Antihyperglycemic Medications        Diabetes Medication History  Key Antihyperglycemic Medications               metFORMIN (GLUCOPHAGE) 500 mg tablet (Taking) Take 1 Tablet by mouth two (2) times daily (with meals) for 30 days. glipiZIDE (GLUCOTROL) 5 mg tablet (Taking) Take 1 tablet by mouth twice daily    Januvia 50 mg tablet (Taking) Take 1 tablet by mouth once daily             Diabetes self-management practices:   Eating pattern                Eats 3 small meals daily  [x]         Breakfast                         2 Eggs, Coffee  [x]         Lunch                               Gail  [x]         Dinner                              \" Food\" Bread, rice, lentils   [x]         Bedtime                           COokie  [x]         Snacks                              Afternoon snack  [x]         Beverages                        Water, Coffee  Physical activity pattern                Sedentary   Monitoring pattern  Discharged last week with a Carolynn CGM and serum glucometer  7 day average Ba-9a: 129-164  9a-12: 243  Noon to 9p: 198-238  1 low BG in the last week overnight    Taking medications pattern  [x]         Consistent administration  [x]         Affordable  Social determinants of health impacting diabetes self-management practices   Concerned that you need to know more about how to stay healthy with diabetes  Overall evaluation:                 [x]         Achieving A1c target with drug therapy & self-care practices    Subjective   \"I am drinking well but not eating a lot\"     Objective   Physical exam  General Underweight [place] [facility] male in acute distress/ill-appearing.     Neuro  Alert, oriented   Vital Signs Visit Vitals  BP (!) 95/48   Pulse 86   Temp 97.6 °F (36.4 °C)   Resp 20   Ht 5' 9\" (1.753 m)   Wt 54.9 kg (121 lb)   SpO2 100%   BMI 17.87 kg/m²     Skin  Warm and dry. No acanthosis noted along neckline. Heart   Regular rate and rhythm. No murmurs, rubs or gallops  Lungs  Clear to auscultation without rales or rhonchi  Extremities No foot wounds        Laboratory  Recent Labs     23  0321 23  0141 23  0136   * 142* 94   AGAP 8 5 7   WBC 5.6 6.8 6.3   CREA 2.12* 2.01* 2.08*   AST 10*  --   --    ALT 14  --   --          Factors impacting BG management  Factor Dose Comments   Nutrition:  Standard meals     60 grams/meal      Heart Failure/NSTEMI/Ischemic cardiomyopathy/Arrhythmias  Milrinone  BNP 4879  EF 25-30%    Infection UTI/PNA  Urine Cx pending   IV antibiotics   FEvers    Other:   Kidney function TANNER on CKD:   Current GFR 35      Blood glucose pattern    Significant diabetes-related events over the past 24-72 hours  UA on admission: 100 glucose, yeast, moderate leuk esterase  A1C 7.7% 23  Fasting B  Pre-prandial:   Basal: Lantus 10 units  Bolus: 4 units Humalog/meal, dose missed this morning.   Correction: 15 units in the last 24h  Eating ok- drinking supplements/milk       Assessment and Nursing Intervention   Nursing Diagnosis Risk for unstable blood glucose pattern   Nursing Intervention Domain 5250 Decision-making Support   Nursing Interventions Examined current inpatient diabetes/blood glucose control   Explored factors facilitating and impeding inpatient management  Explored corrective strategies with patient and responsible inpatient provider   Informed patient of rational for insulin strategy while hospitalized     Nursing Diagnosis 46325 Ineffective Health Management   Nursing Intervention Domain 5250 Decision-making Support   Nursing Interventions Identified diabetes self-management practices impeding diabetes control  Discussed diabetes survival skills related to  Healthy Plate eating plan; given handouts  Role of physical activity in improving insulin sensitivity and action  Procedure for blood glucose monitoring & options for low-cost products  Medications plan at discharge     Billing Code(s)   23466    Before making these care recommendations, I personally reviewed the hospitalization record, including notes, laboratory & diagnostic data and current medications, and examined the patient at the bedside (circumstances permitting) before determining care. More than fifty (50) percent of the time was spent in patient counseling and/or care coordination.   Total minutes: 25    De Maury, CNS  Diabetes Clinical Nurse Specialist  [facility]  Access via [employer]

## 2023-02-03 NOTE — PROGRESS NOTES
1730: TRANSFER - IN REPORT:    Verbal report received from Chelle RN(name) on Leandra Carver  being received from Jose Neri RN(unit) for urgent transfer      Report consisted of patients Situation, Background, Assessment and   Recommendations(SBAR). Information from the following report(s) SBAR, Intake/Output, MAR, Recent Results, Med Rec Status, and Cardiac Rhythm NSR  was reviewed with the receiving nurse. Opportunity for questions and clarification was provided. Assessment completed upon patients arrival to unit and care assumed. [address] here to see pt consent signed and pt prepped for [place] and [place] insertion. 1800: Pt tolerated procedure well. Xray comfirmed placement. 1830: PA numbers called to [facility] ANP orders received. Milrinone drip increased to 0.3    1930: Bedside and Verbal shift change report given to [address]  (oncoming nurse) by Jose Neri RN (offgoing nurse).  Report included the following information Procedure Summary, Intake/Output, MAR, Med Rec Status, and Cardiac Rhythm ST .

## 2023-02-03 NOTE — PROGRESS NOTES
Patient name: Harsha Cedeno  MRN: [id number]    Nephrology Progress note:    Assessment:  TANNER on CKD-3a: Cr up to 2.1mg/dl Suspect cardiorenal effects with needed diuresis. Held diuresis but not seeing much improvement in Cr. Hypotension/poor renal perfusion likely culprit. Recent baseline Cr has been in the low 1s. Hyperkalemia: K bump to 5.4-> 2 to oral KCL/evolving TANNER. Better s/p Lokelma dose x1     Ischemic CM: Recurrent exacerbations. EF 20%. On continuous Milrinone since last admission. Interested in LVAD-> sorting out candidacy     BPH: s/p donnelly     Well differentiated NET Grade 1: noted on EGD 1/18/23     Anemia 2 to CKD:Hgb sub-optimal     Hyponatremia: mild-> better     Left UE basilic and radial vein thrombus    Plan/Recommendations:  Need to find a a way to keep his MAPS >65-> discussed with AHFT  Consider swan placement  Holding IV Lasix  Holding KCl  Continuous Primacor drip  IV Abx finished  BRIGHT (last dose 2/2/23)  Appreciate oncology involvement/insight  Palliative care involvement also appreciated  Strict I/Os  Am labs         Subjective:  Remains on O2. Admits to weakness/dyspnea with limited exertion. Reports early satiety/mild nausea after eating. Poor intake yesterday.  MAPS persistently depressed    ROS:   no vomiting  No chest pain    Exam:  Visit Vitals  BP (!) 95/48   Pulse 86   Temp 97.6 °F (36.4 °C)   Resp 20   Ht 5' 9\" (1.753 m)   Wt 54.9 kg (121 lb)   SpO2 100%   BMI 17.87 kg/m²     Wt Readings from Last 3 Encounters:   02/03/23 54.9 kg (121 lb) 01/25/23 55.8 kg (123 lb)   01/23/23 54.9 kg (121 lb)       Intake/Output Summary (Last 24 hours) at 2/3/2023 0935  Last data filed at 2/3/2023 0330  Gross per 24 hour   Intake 1067.93 ml   Output 300 ml   Net 767.93 ml         Gen: NAD/Frail  HEENT: AT/NC  Lungs/Chest wall: Clear. No accessory muscle use. Cardiovascular: Regular rate, normal rhythm. Abdomen/: Soft, NT, ND, BS+. +Vasquez  Ext: No peripheral edema  CNS: alert and awake.  Answers appropriately      Current Facility-Administered Medications   Medication Dose Route Frequency Last Admin    insulin glargine (LANTUS) injection 10 Units  10 Units SubCUTAneous DAILY      epoetin heidy-epbx (RETACRIT) injection 10,000 Units  10,000 Units SubCUTAneous Q7D 10,000 Units at 02/02/23 2213    senna-docusate (PERICOLACE) 8.6-50 mg per tablet 1 Tablet  1 Tablet Oral DAILY PRN      polyethylene glycol (MIRALAX) packet 17 g  17 g Oral DAILY PRN      insulin lispro (HUMALOG) injection 6 Units  6 Units SubCUTAneous TID WITH MEALS 6 Units at 02/02/23 1958    traZODone (DESYREL) tablet 50 mg  50 mg Oral QHS PRN 50 mg at 02/02/23 2212    apixaban (ELIQUIS) tablet 10 mg  10 mg Oral BID 10 mg at 02/02/23 1958    Followed by    Eve Bolden ON 2/6/2023] apixaban (ELIQUIS) tablet 5 mg  5 mg Oral BID      [Held by provider] potassium chloride SR (KLOR-CON 10) tablet 20 mEq  20 mEq Oral BID 20 mEq at 01/30/23 1030    glucose chewable tablet 16 g  4 Tablet Oral PRN      glucagon (GLUCAGEN) injection 1 mg  1 mg IntraMUSCular PRN      dextrose 10 % infusion 0-250 mL  0-250 mL IntraVENous PRN      insulin lispro (HUMALOG) injection   SubCUTAneous AC&HS 12 Units at 02/02/23 1648    balsam peru-castor oiL (VENELEX) ointment   Topical BID Given at 02/02/23 1959    metoprolol succinate (TOPROL-XL) XL tablet 12.5 mg  12.5 mg Oral Q12H 12.5 mg at 02/02/23 2200    lidocaine 4 % patch 1 Patch  1 Patch TransDERmal Q24H 1 Patch at 02/02/23 1241    amiodarone (CORDARONE) tablet 400 mg  400 mg Oral  mg at 02/02/23 1958    aspirin delayed-release tablet 81 mg  81 mg Oral DAILY 81 mg at 02/02/23 0830    sodium chloride (NS) flush 5-40 mL  5-40 mL IntraVENous Q8H 10 mL at 02/03/23 0730    sodium chloride (NS) flush 5-40 mL  5-40 mL IntraVENous PRN      acetaminophen (TYLENOL) tablet 650 mg  650 mg Oral Q6H  mg at 02/02/23 2212    Or    acetaminophen (TYLENOL) suppository 650 mg  650 mg Rectal Q6H PRN      polyethylene glycol (MIRALAX) packet 17 g  17 g Oral DAILY PRN 17 g at 02/02/23 1539    ondansetron (ZOFRAN ODT) tablet 4 mg  4 mg Oral Q8H PRN 4 mg at 01/30/23 1357    Or    ondansetron (ZOFRAN) injection 4 mg  4 mg IntraVENous Q6H PRN 4 mg at 02/02/23 1313    [Held by provider] furosemide (LASIX) injection 40 mg  40 mg IntraVENous BID 40 mg at 01/30/23 0942    heparin (porcine) pf 500 Units  500 Units InterCATHeter PRN      pantoprazole (PROTONIX) tablet 40 mg  40 mg Oral ACB 40 mg at 02/03/23 0729    rosuvastatin (CRESTOR) tablet 20 mg  20 mg Oral QHS 20 mg at 02/02/23 2200    milrinone (PRIMACOR) 20 MG/100 ML D5W infusion  0.2 mcg/kg/min IntraVENous CONTINUOUS 0.2 mcg/kg/min at 02/03/23 0328    heparin (porcine) pf 500 Units  500 Units InterCATHeter DAILY 500 Units at 02/01/23 0902       Labs/Data:    Lab Results   Component Value Date/Time    WBC 5.6 02/03/2023 03:21 AM    HGB 7.5 (L) 02/03/2023 03:21 AM    HCT 25.8 (L) 02/03/2023 03:21 AM    PLATELET 599 03/15/6697 03:21 AM    MCV 86.0 02/03/2023 03:21 AM       Lab Results   Component Value Date/Time    Sodium 137 02/03/2023 03:21 AM    Potassium 4.6 02/03/2023 03:21 AM    Chloride 107 02/03/2023 03:21 AM    CO2 22 02/03/2023 03:21 AM    Anion gap 8 02/03/2023 03:21 AM    Glucose 129 (H) 02/03/2023 03:21 AM    BUN 42 (H) 02/03/2023 03:21 AM    Creatinine 2.12 (H) 02/03/2023 03:21 AM    BUN/Creatinine ratio 20 02/03/2023 03:21 AM    GFR est AA 46 (L) 06/23/2022 09:40 AM    GFR est non-AA 38 (L) 06/23/2022 09:40 AM    eGFR 33 (L) 02/03/2023 03:21 AM    eGFR 69 01/25/2023 11:55 AM    Calcium 9.1 02/03/2023 03:21 AM       Patient seen and examined. Chart reviewed. Labs, data and other pertinent notes reviewed in last 24 hrs.     Discussed with patient and AHFT    Signed by:  Erika Monsalve MD  [address]

## 2023-02-03 NOTE — PROGRESS NOTES
0730: Bedside and Verbal shift change report given to LORI Corbett (oncoming nurse) by Maxwell Burns RN (offgoing nurse). Report included the following information SBAR, Kardex, MAR, and Cardiac Rhythm NSR . Rate verified Milrinone gtt. 1000: Pt nauseated and vomiting post medication administration. Zofran administered. MD Pacheco Rodriguez and NP Cody Rhoades notified d/t acute change. 1100: Pt increased SOB, increased oxygen to 3 L NC for comfort. MD Pacheco Rodriguez and NP Cody Rhoades notified. 1200: Pt transported down for PFT test, with nurse, monitor, oxygen, and milrinone at 0.2 mcg.     1412: Pt and family agreed to LVAD, Cody Rhoades NP with [facility] notified. New orders to follow. 1700: TRANSFER - OUT REPORT:    Verbal report given to LORI Dale (name) on Carissa Easton  being transferred to CVICU (unit) for change in patient condition(requiring yumiko for fluid management additional treatment)       Report consisted of patients Situation, Background, Assessment and   Recommendations(SBAR). Information from the following report(s) SBAR, Kardex, MAR, Recent Results, and Cardiac Rhythm NSR / Aldo Simon  was reviewed with the receiving nurse. Lines:   PICC Double Lumen 94/91/80 Right;Basilic (Active)   Central Line Being Utilized Yes 02/03/23 0815   Criteria for Appropriate Use Long term IV/antibiotic administration 02/03/23 0815   Site Assessment Clean, dry, & intact 02/03/23 0815   Phlebitis Assessment 0 02/03/23 0815   Infiltration Assessment 0 02/03/23 0815   Arm Circumference (cm) 22 cm 01/12/23 1612   Date of Last Dressing Change 02/03/23 02/03/23 0815   Dressing Status New 02/03/23 0815   Action Taken Dressing changed 02/03/23 0815   External Catheter Length (cm) 0 centimeters 02/03/23 0815   Dressing Type Bacteriocidal;Stabilization/securement device;Transparent 02/03/23 0815   Hub Color/Line Status Purple;Flushed; Infusing 02/03/23 0800   Positive Blood Return (Site #1) Yes 02/03/23 0800   Hub Color/Line Status Red;Flushed;Capped 02/03/23 0800 Positive Blood Return (Site #2) Yes 02/03/23 0800   Alcohol Cap Used Yes 02/03/23 0815       Peripheral IV 01/26/23 Left Antecubital (Active)   Site Assessment Clean, dry, & intact 02/03/23 0800   Phlebitis Assessment 0 02/03/23 0800   Infiltration Assessment 0 02/03/23 0800   Dressing Status Clean, dry, & intact 02/03/23 0800   Dressing Type Transparent;Tape 02/03/23 0800   Hub Color/Line Status Flushed;Capped 02/03/23 0800   Action Taken Open ports on tubing capped 02/03/23 0800   Alcohol Cap Used Yes 02/03/23 0800        Opportunity for questions and clarification was provided. Patient transported with:   Monitor  O2 @ 4 liters  Registered Nurse        Care Plan:   Problem: Diabetes Self-Management  Goal: *Disease process and treatment process  Description: Define diabetes and identify own type of diabetes; list 3 options for treating diabetes. Outcome: Progressing Towards Goal  Goal: *Incorporating nutritional management into lifestyle  Description: Describe effect of type, amount and timing of food on blood glucose; list 3 methods for planning meals. Outcome: Progressing Towards Goal  Goal: *Incorporating physical activity into lifestyle  Description: State effect of exercise on blood glucose levels. Outcome: Progressing Towards Goal  Goal: *Developing strategies to promote health/change behavior  Description: Define the ABC's of diabetes; identify appropriate screenings, schedule and personal plan for screenings. Outcome: Progressing Towards Goal  Goal: *Using medications safely  Description: State effect of diabetes medications on diabetes; name diabetes medication taking, action and side effects. Outcome: Progressing Towards Goal  Goal: *Monitoring blood glucose, interpreting and using results  Description: Identify recommended blood glucose targets  and personal targets.   Outcome: Progressing Towards Goal  Goal: *Prevention, detection, treatment of acute complications  Description: List symptoms of hyper- and hypoglycemia; describe how to treat low blood sugar and actions for lowering  high blood glucose level. Outcome: Progressing Towards Goal  Goal: *Prevention, detection and treatment of chronic complications  Description: Define the natural course of diabetes and describe the relationship of blood glucose levels to long term complications of diabetes. Outcome: Progressing Towards Goal  Goal: *Developing strategies to address psychosocial issues  Description: Describe feelings about living with diabetes; identify support needed and support network  Outcome: Progressing Towards Goal  Goal: *Insulin pump training  Outcome: Progressing Towards Goal  Goal: *Sick day guidelines  Outcome: Progressing Towards Goal  Goal: *Patient Specific Goal (EDIT GOAL, INSERT TEXT)  Outcome: Progressing Towards Goal     Problem: Pressure Injury - Risk of  Goal: *Prevention of pressure injury  Description: Document Mike Scale and appropriate interventions in the flowsheet.   Outcome: Progressing Towards Goal  Note: Pressure Injury Interventions:  Sensory Interventions: Minimize linen layers, Maintain/enhance activity level, Keep linens dry and wrinkle-free, Float heels, Discuss PT/OT consult with provider    Moisture Interventions: Apply protective barrier, creams and emollients, Absorbent underpads    Activity Interventions: PT/OT evaluation, Pressure redistribution bed/mattress(bed type), Increase time out of bed    Mobility Interventions: PT/OT evaluation    Nutrition Interventions: Document food/fluid/supplement intake    Friction and Shear Interventions: Apply protective barrier, creams and emollients       Problem: Patient Education: Go to Patient Education Activity  Goal: Patient/Family Education  Outcome: Progressing Towards Goal       Problem: Patient Education: Go to Patient Education Activity  Goal: Patient/Family Education  Outcome: Progressing Towards Goal       Problem: Pain  Goal: *Control of Pain  Outcome: Progressing Towards Goal  Goal: *PALLIATIVE CARE:  Alleviation of Pain  Outcome: Progressing Towards Goal

## 2023-02-03 NOTE — WOUND CARE
WOCN Note:     Re-consulted for right heel. Chart shows:  Admitted on 12/22/22. Admitted for sepsis, cardiogenic shock, respiratory failure. History of MI, CABG x3, DM, CHF. Assessment:   Able to turn independently onto right side. Conversational and reports heel pain at night. left heel intact and without erythema. 1. POA right heel deep tissue injury  3 x 3 x 0 cm  Area if non-blanching deep purple with some MASD to top layer of skin. Some softening of tissue due to foam dressing. Removed foam dressing and painted the heel with betadine. No purulence or redness  Heel placed in boot after betadine was dry. Bilateral buttocks with some chaffing - sacral foam in use. Wound Recommendations:    Venelex twice daily sacrum  Paint right heel with betadine daily and allow to air dry; use boot when in bed    PI Prevention:  Turn/reposition approximately every 2 hours  Offload heels with heels hanging off end of pillow at all times while in bed. Sacral Foam dressing: lift to assess regularly; change as needed. Discontinue if incontinence is frequently soiling dressing. Low Air Loss mattress: Use only flat sheet and one incontinence pad. No changes to relay to provider.      Transition of Care: Plan to follow weekly and as needed while admitted to hospital.      ROSY Lopez, RN, [facility]  Certified Wound, Ostomy, Continence Nurse  office [phone]  Available via [facility]

## 2023-02-03 NOTE — PROGRESS NOTES
[facility] in [place], [place]  Inpatient Progress Note      Patient name: Lauren Reina  Patient : 1950  Patient MRN: [id number]  Consulting MD: Fallon Alcantara MD  Date of service: 23    REASON FOR REFERRAL:  Management of chronic systolic heart failure     PLAN OF CARE:  70 y/o male with likely combined non-ischemic and ischemic cardiomyopathy, LVEF 25-30%, stage D, NYHA class IIIB/IV; initiated on home milrinone gtt as bridge to completion of LVAD workup  Most likely etiology of cardiomyopathy: CAD +/- TRISTAN +/- inappropriate sinus tach +/- undergoing workup  Patient presented with symptomatic SVT (a-flutter) with RVR converted to sinus tach after amio loading; EP following, remains in ST/SR on PO amio and BBx  Completing remainder of evaluation for LVAD for destination therapy while in-patient and supported in milrinone gtt; also undergoing treatment of suspected PNA, ruling out bacteremia. During LVAD eval, found to have well-differentiated neuroendocrine tumor on gastric body and gastric polyp, G1; with GI, Oncology and Palliative following. Per Oncology- this may be a very treatable tumor with good prognosis and thus would not preclude LVAD. No absolute contraindications to LVAD at this point, now with good prognosis from Oncology (see their note for details); continuing with LVAD eval, will be presented at [facility] this afternoon for decision on LVAD.      If approved for LVAD and pt is agreeable, would move pt to CVICU, place [place], assess hemodynamics and then consider additional inotropic agent vs impella to monitor renal fxn and optimize pre-op        RECOMMENDATIONS:  Continue milrinone  0.2mcg/kg/min unable to uptitrate due to HR and BP  Hold toprol due to hypotension, concern for poor renal perfusion contributing to TANNER on CKD  Cannot tolerate ARB/ARNi due to hypotension  BP responded well after holding flomax  Cannot tolerate spironolactone due to hyperkalemia  Cannot tolerate jardiance due to significant diuresis on smallest dose/contributed to IVVD  Discontinued corlanor due to SVT  Digoxin stopped d/t risk for toxicity with amio loading; monitoring level  Continue amiodarone, appreciate EP cardiology recs  Hold lasix and potassium; montior renal function  Appreciate nephrology consult  Keep K+ >4 and Mg >2  Abx/treatment of suspected PNA per hospitalist  Continue lifevest  Continue baby aspirin   Plavix previously stopped, okayed by Dr. Danny Sun consult pending (high risk for TRISTAN)  Heel pressure ulcer- wound care consulted  VAD evaluation in progress, plan to make determination today at [facility]     All other care per primary team, hopefully home end of the week      INTERVAL HISTORY:  NAEO  Cr holding around 2  Net +570mL last 24 hrs  Pro BNP up slightly  Pt ambulating around unit, more fatigued and dyspneic today  Had BM this morning; reporting nausea with meds       IMPRESSION:  Acute on chronic combined systolic/diastolic  heart failure  Stage D, NYHA class IIIB/IV symptoms   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, LVEF 25-30% and 23% (by cMRI 1/3/23)  Cardiac MRI suggestive of ischemic cardiomyopathy  PYP equivocal  Chronic milrinone infusion as palliation   Coronary artery disease  CAD s/p CABG x 2: further disease best managed medically due to small vessel size   At risk of sudden cardiac death  Peripheral arterial disease  Bilateral hydronephrosis s/p donnelly  Cardiac risk factors:  HTN  HL  TRISTAN, STOP-BANG 4  DM2  CKD, stage 3  MOCA from 1/4/22 17/30, consistent with mild cognitive impairment  Hard of hearing  Gastric Neuroendocrine Tumor  Sepsis, unclear source         LIFE GOALS:  Lifestyle goals reviewed with the patient.   Patient's personal goals include: TBD  Important upcoming milestones or family events: TBD  The patient identifies the following as important for living well: TBD CARDIAC IMAGING:  Echo 1/9/23   Left Ventricle: Severely reduced left ventricular systolic function with a visually estimated EF of 25 - 30%. Left ventricle size is normal. Mildly increased wall thickness. Echo 12/26/22    Left Ventricle: Severely reduced left ventricular systolic function with a visually estimated EF of 25 - 30%. Left ventricle size is normal. Normal wall thickness. There are regional wall motion abnormalities. Grade II diastolic dysfunction with increased LAP. Right Ventricle: Moderately reduced systolic function. TAPSE is abnormal. TAPSE is 1.1 cm. Aortic Valve: Mild stenosis of the aortic valve. AV peak gradient is 13 mmHg. AV peak velocity is 1.8 m/s. Mitral Valve: Not well visualized. Moderate annular calcification at the posterior leaflet of the mitral valve. Mild to moderate regurgitation. Tricuspid Valve: Mildly elevated RVSP. Left Atrium: Left atrium is moderately dilated. 12/8/22    Left Ventricle: Moderately reduced left ventricular systolic function with a visually estimated EF of 35 - 40%. Severe hypokinesis of the following segments: mid anteroseptal, apical anterior, apical septal, apical inferior and apical lateral. Severe hypokinesis of the apex. Mitral Valve: Severely thickened leaflet, at the anterior and posterior leaflets. Severely calcified leaflet, at the anterior and posterior leaflets. Mild annular calcification of the mitral valve. Moderate regurgitation. Left Atrium: Left atrium is mildly dilated. Contrast used: Definity. limited study     EKG 12/22/22 ST, Biatria enlargement, marked ST abnormality     C 12/6/22  1. Normal LVEDP  2. Severe native multivessel coronary artery disease  3. Patent LIMA to LAD and vein graft to distal RCA  4. Recurrent ISR in OM1 stent with now 60 to 70% restenosis  5. Recoil of left main and circumflex stent with now recurrent 40 to 50% stenosis.   6.  Progression of ostial left main disease now to about 60% stenosis  7. Progression of disease in jailed first marginal branch now with diffuse 90% stenosis  8. High-grade stenosis in the mid to distal right potential femoral artery treated with 6 x 40 mm impact drug-coated balloon angioplasty to reduce the stenosis to less than 40%     NST        HEMODYNAMICS:  RHC 1/9/23  PA 20/9, RA 3, PCWP 8, CI 1.8     CPEST too ill   6MW 300 feet       HPI:  71 y.o. male who was admitted via ED for worsening SOB, poor appetite, orthopnea and fatigue. Pmhx of CAD s/p CABG, PAD, DM 2, recent admission for HFmrEF earlier this month. Serial TTEs show progressive decline in EF since 3/2021 with the most recent EF at 25-30%. Recent LHC shows diffuse CAD and no interventions indicated. Patient had Maia [place] recently and there is some thought to myocarditis or other etiology causing worsening HF. [facility] was consulted for further evaluation and management of HFrEF. REVIEW OF SYSTEMS:  Review of Systems   Constitutional:  Positive for malaise/fatigue. Negative for chills and fever. Respiratory:  Positive for shortness of breath. Negative for cough. Cardiovascular:  Negative for chest pain, palpitations, orthopnea and leg swelling. Gastrointestinal:  Negative for abdominal pain, heartburn, nausea and vomiting. Genitourinary:         Poor appetite   Neurological:  Positive for weakness. Negative for dizziness. PHYSICAL EXAM:  Visit Vitals  BP (!) 95/48   Pulse 86   Temp 97.6 °F (36.4 °C)   Resp 20   Ht 5' 9\" (1.753 m)   Wt 121 lb (54.9 kg)   SpO2 100%   BMI 17.87 kg/m²     Physical Exam  Vitals and nursing note reviewed. Constitutional:       General: He is not in acute distress. Appearance: Normal appearance. He is ill-appearing. Cardiovascular:      Rate and Rhythm: Normal rate and regular rhythm. Heart sounds: Normal heart sounds. No murmur heard. No friction rub. No gallop.    Pulmonary:      Effort: Pulmonary effort is normal. No respiratory distress. Breath sounds: Normal breath sounds. Abdominal:      General: Bowel sounds are normal. There is no distension. Palpations: Abdomen is soft. Tenderness: There is no abdominal tenderness. Musculoskeletal:      Right lower le+ Pitting Edema present. Left lower le+ Pitting Edema present. Skin:     General: Skin is warm and dry. Capillary Refill: Capillary refill takes less than 2 seconds. Neurological:      General: No focal deficit present. Mental Status: He is alert and oriented to person, place, and time. Psychiatric:         Mood and Affect: Mood normal.         Behavior: Behavior normal.         Thought Content:  Thought content normal.         Judgment: Judgment normal.            PAST MEDICAL HISTORY:  Past Medical History:   Diagnosis Date    CAD (coronary artery disease) 11/10/2016    NSTEMI & 2 stents    Deafness 10/28/2012    DM (diabetes mellitus) ([facility]     Elevated cholesterol     Hypertension     NSTEMI (non-ST elevated myocardial infarction) ([facility] 11/10/2016       PAST SURGICAL HISTORY:  Past Surgical History:   Procedure Laterality Date    COLONOSCOPY N/A 2018    COLONOSCOPY performed by Roni Song MD at [facility] ENDOSCOPY    COLONOSCOPY N/A 2023    COLONOSCOPY performed by Nitin Zaidi MD at [address] [place] [address]  2016    2 stents       FAMILY HISTORY:  Family History   Problem Relation Age of Onset    Heart Disease Father     Heart Attack Father     Hypertension Mother     Elevated Lipids Brother     Elevated Lipids Brother     No Known Problems Sister     Elevated Lipids Brother     No Known Problems Son     No Known Problems Daughter     Anesth Problems Neg Hx        SOCIAL HISTORY:  Social History     Socioeconomic History    Marital status:    Tobacco Use    Smoking status: Never     Passive exposure: Never    Smokeless tobacco: Never   Vaping Use    Vaping Use: Never used   Substance and Sexual Activity    Alcohol use: Yes     Alcohol/week: 2.0 standard drinks     Types: 1 Cans of beer, 1 Shots of liquor per week     Comment: rarely    Drug use: No    Sexual activity: Yes     Social Determinants of Health     Financial Resource Strain: Medium Risk    Difficulty of Paying Living Expenses: Somewhat hard   Food Insecurity: Food Insecurity Present    Worried About Running Out of Food in the Last Year: Never true    Ran Out of Food in the Last Year: Often true       LABORATORY RESULTS:     Labs Latest Ref Rng & Units 2/3/2023 2/2/2023 2/1/2023 1/31/2023 1/30/2023 1/29/2023 1/28/2023   WBC 4.1 - 11.1 K/uL 5.6 6.8 6.3 7.6 8.0 8.8 -   RBC 4.10 - 5.70 M/uL 3.00(L) 3.32(L) 3.27(L) 3.66(L) 3.48(L) 3.52(L) -   Hemoglobin 12.1 - 17.0 g/dL 7. 5(L) 8. 3(L) 8.0(L) 9.1(L) 8.5(L) 8.7(L) -   Hematocrit 36.6 - 50.3 % 25. 8(L) 28. 7(L) 27. 7(L) 30. 6(L) 29. 5(L) 29. 3(L) -   MCV 80.0 - 99.0 FL 86.0 86.4 84.7 83.6 84.8 83.2 -   Platelets 366 - 182 K/uL 259 304 271 307 297 270 -   Lymphocytes 12 - 49 % 17 20 21 20 23 33 -   Monocytes 5 - 13 % 9 10 10 9 10 10 -   Eosinophils 0 - 7 % 1 2 3 3 1 3 -   Basophils 0 - 1 % 1 1 1 1 1 1 -   Albumin 3.5 - 5.0 g/dL 3. 0(L) - - 3. 1(L) - - -   Calcium 8.5 - 10.1 MG/DL 9.1 9.1 9.0 9.1 9.0 8.8 8.4(L)   Glucose 65 - 100 mg/dL 129(H) 142(H) 94 221(H) 261(H) 280(H) 380(H)   BUN 6 - 20 MG/DL 42(H) 40(H) 33(H) 34(H) 27(H) 24(H) 23(H)   Creatinine 0.70 - 1.30 MG/DL 2.12(H) 2.01(H) 2.08(H) 2.09(H) 1.88(H) 1.78(H) 1.58(H)   Sodium 136 - 145 mmol/L 137 135(L) 136 131(L) 131(L) 132(L) 132(L)   Potassium 3.5 - 5.1 mmol/L 4.6 4.9 4.9 5.4(H) 4.8 4.4 4.3   TSH 0.36 - 3.74 uIU/mL - - - - - - -   PSA 0.01 - 4.0 ng/mL - - - - - - -   LDH 85 - 241 U/L - - - - - - -   Some recent data might be hidden     Lab Results   Component Value Date/Time    TSH 3.52 01/28/2023 05:26 AM    TSH 2.12 12/27/2022 02:36 PM    TSH 4.80 (H) 12/06/2022 03:53 AM    TSH 5.39 (H) 10/12/2022 09:10 AM    TSH 3.53 02/03/2022 11:47 AM    TSH 5.790 (H) 11/21/2019 04:45 PM    TSH 3.08 06/22/2018 01:53 PM    TSH 4.250 05/26/2015 09:43 AM       ALLERGY:  No Known Allergies     CURRENT MEDICATIONS:    Current Facility-Administered Medications:     insulin glargine (LANTUS) injection 10 Units, 10 Units, SubCUTAneous, DAILY, Cathy Sheldon CNS    epoetin heidy-epbx (RETACRIT) injection 10,000 Units, 10,000 Units, SubCUTAneous, Q7D, Jadiel Galvan MD, 10,000 Units at 02/02/23 2213    senna-docusate (PERICOLACE) 8.6-50 mg per tablet 1 Tablet, 1 Tablet, Oral, DAILY PRN, Gay STALEY NP    polyethylene glycol (MIRALAX) packet 17 g, 17 g, Oral, DAILY PRN, Gay STALEY NP    insulin lispro (HUMALOG) injection 6 Units, 6 Units, SubCUTAneous, TID WITH MEALS, Cathy Sheldon CNS, 6 Units at 02/02/23 1958    traZODone (DESYREL) tablet 50 mg, 50 mg, Oral, QHS PRN, Job Munoz MD, 50 mg at 02/02/23 2212    apixaban (ELIQUIS) tablet 10 mg, 10 mg, Oral, BID, 10 mg at 02/02/23 1958 **FOLLOWED BY** [START ON 2/6/2023] apixaban (ELIQUIS) tablet 5 mg, 5 mg, Oral, BID, Job Munoz MD    [Held by provider] potassium chloride SR (KLOR-CON 10) tablet 20 mEq, 20 mEq, Oral, BID, Gay STALEY NP, 20 mEq at 01/30/23 1030    glucose chewable tablet 16 g, 4 Tablet, Oral, PRN, Ita Harvey NP    glucagon (GLUCAGEN) injection 1 mg, 1 mg, IntraMUSCular, PRN, Ita Harvey NP    dextrose 10 % infusion 0-250 mL, 0-250 mL, IntraVENous, PRN, Ita Harvey NP    insulin lispro (HUMALOG) injection, , SubCUTAneous, AC&HS, Ita Harvey NP, 12 Units at 02/02/23 1648    balsam peru-castor oiL (VENELEX) ointment, , Topical, BID, Ita Harvey NP, Given at 02/02/23 1959    [Held by provider] metoprolol succinate (TOPROL-XL) XL tablet 12.5 mg, 12.5 mg, Oral, Q12H, Deisy Stein MD, 12.5 mg at 02/02/23 2200    lidocaine 4 % patch 1 Patch, 1 Patch, TransDERmal, Q24H, Job Munoz MD, 1 Patch at 02/02/23 1241 amiodarone (CORDARONE) tablet 400 mg, 400 mg, Oral, BID, Ami Rubalcava MD, 400 mg at 02/02/23 1958    aspirin delayed-release tablet 81 mg, 81 mg, Oral, DAILY, Heber Barajas MD, 81 mg at 02/02/23 0830    sodium chloride (NS) flush 5-40 mL, 5-40 mL, IntraVENous, Q8H, Heber Barajas MD, 10 mL at 02/03/23 0730    sodium chloride (NS) flush 5-40 mL, 5-40 mL, IntraVENous, PRN, Heber Barajas MD    acetaminophen (TYLENOL) tablet 650 mg, 650 mg, Oral, Q6H PRN, 650 mg at 02/02/23 2212 **OR** acetaminophen (TYLENOL) suppository 650 mg, 650 mg, Rectal, Q6H PRN, Heber Barajas MD    polyethylene glycol (MIRALAX) packet 17 g, 17 g, Oral, DAILY PRN, Heber Barajas MD, 17 g at 02/02/23 1539    ondansetron (ZOFRAN ODT) tablet 4 mg, 4 mg, Oral, Q8H PRN, 4 mg at 01/30/23 1357 **OR** ondansetron (ZOFRAN) injection 4 mg, 4 mg, IntraVENous, Q6H PRN, Heber Barajas MD, 4 mg at 02/02/23 1313    [Held by provider] furosemide (LASIX) injection 40 mg, 40 mg, IntraVENous, BID, Heber Barjaas MD, 40 mg at 01/30/23 0942    heparin (porcine) pf 500 Units, 500 Units, InterCATHeter, PRN, Heber Barajas MD    pantoprazole (PROTONIX) tablet 40 mg, 40 mg, Oral, ACB, Heber Barajas MD, 40 mg at 02/03/23 0729    rosuvastatin (CRESTOR) tablet 20 mg, 20 mg, Oral, QHS, Heber Barajas MD, 20 mg at 02/02/23 2200    milrinone (PRIMACOR) 20 MG/100 ML D5W infusion, 0.2 mcg/kg/min, IntraVENous, CONTINUOUS, Heber Barajas MD, Last Rate: 3.3 mL/hr at 02/03/23 0328, 0.2 mcg/kg/min at 02/03/23 0328    heparin (porcine) pf 500 Units, 500 Units, InterCATHeter, DAILY, Heber Barajas MD, 500 Units at 02/01/23 0902    PATIENT CARE TEAM:  Patient Care Team:  Radha Graves MD as PCP - General (Family Medicine)  Radha Graves MD as PCP - [facility]  Guido Daly MD (Cardiovascular Disease Physician)  Alondra Elizalde MD (Gastroenterology)  John Ovalle MD (Cardiothoracic Surgery)  Abmer Conroy, MD (Cardiovascular Disease Physician)  Ryan Arita MD (Nephrology)  Yissel Torres RN as Care Transitions Nurse  Petty Powell MD (Pulmonary Disease)  Elisabet Bacon MD ([address] [place] [address] Vascular Surgery)     Thank you for allowing me to participate in this patient's care.     Bear Hidalgo NP   [address] [place] [address], [address]  Phone: [phone]

## 2023-02-03 NOTE — PROGRESS NOTES
Occupational Therapy    Chart reviewed, patient potentially being transferred to ICU for increased medical support. Will hold at this time and follow up as appropriate.      Nini Reese MS, OTR/L

## 2023-02-03 NOTE — PROGRESS NOTES
Physical Therapy    Chart reviewed, spoke with RN who notes pt preparing for transfer to ICU for increased support. Will defer session at this time and con't to follow as appropriate.     Angie Sy, PT, MPT

## 2023-02-03 NOTE — PROGRESS NOTES
LVAD/TRANSPLANT [address]   Date: 02/03/23    Committee Members:   Dr. Josue Umanzor MD PhD (Medical Director), Dr. Erick Thornton MD (Surgical Director), PRESTON Yee (Chief Nurse Practitioner), Julio Ayala NP ([facility] Nurse Practitioner), Wild Botello RN (VAD Coordinator), Vito Roemro RN (VAD Coordinator), Boris Duckworth RN (VAD Coordinator), Salome Mccartney LCSW ()   Criteria Met:  Chronic systolic heart failure  Stage D, NYHA class IV symptoms  LVEF < 25% by Cardiac  MRI   Not transplant candidate due to age   Inotrope dependence for more than 14 days   Absolute contraindications:  none   Relative contraindications:  Small frame  Malnutrition, BMI 17.72, Cachexia, frailty   Physical deconditioning   Peripheral Vascular Diease   Urinary retention requiring donnelly catheter   Neuroendocrine tumor class 1   Heal injury requiring wound care consult  Concern regarding neurocognitive dysfunction   Chronic kidney disease with Cr- of 2.0    Decision:  Accepted for implantation of LVAD as destination therapy    Plan of care discussion:  N/A   Additional comments:  N/A   Prepared by:   Boris Duckworth RN  VAD Coordinator

## 2023-02-04 NOTE — PROGRESS NOTES
0800: Bedside and Verbal shift change report given to [address]man (oncoming nurse) by Flora Bedolla RN (offgoing nurse). Report included the following information SBAR, Intake/Output, MAR, Recent Results, Cardiac Rhythm NSR, and Alarm Parameters . 1042: 1 unit prbcs given    1225: CI 1.7-1.8, Donn Mendoza MD at bedside. CXR reviewed. [place] retracted 6 cm, now at 46 cm, orders received for [place]    1257: Pt CVP 17-20, prn bumex given    2000: Bedside and Verbal shift change report given to [address] (oncoming nurse) by Margaux Barber RN (offgoing nurse). Report included the following information SBAR, OR Summary, Intake/Output, MAR, Recent Results, Cardiac Rhythm NSR, and Alarm Parameters .

## 2023-02-04 NOTE — PROGRESS NOTES
2000 Bedside shift change report given to [address] (oncoming nurse) by Franc Delacruz RN (offgoing nurse). Report included the following information SBAR, Kardex, ED Summary, Procedure Summary, Intake/Output, MAR, Accordion, Recent Results, and Cardiac Rhythm NSR .     2300 PA waveforms changed with significantly lower PAP- Intensivist at bedside. At 2000, placement of swan was at 58cm. MD adjusted swan to a marking of 55.5cm, swan locked. 0500 Mixed venous drawn for RT- results appear to be more arterial than venous. Results NOT validated; MD informed. STAT CXR ordered. MD to review results for proper placement. 8058 CXR review by Dr Law Hilliard Sine adjusted to the 50cm ritu. New mixed venous drawn and given to RT.    0730 Bedside shift change report given to [address] (oncoming nurse) by Kassandra Hernandez RN (offgoing nurse). Report included the following information SBAR, Kardex, ED Summary, Procedure Summary, Intake/Output, MAR, Accordion, Recent Results, and Cardiac Rhythm NSR .   ________________________________________________  Problem: Diabetes Self-Management  Goal: *Disease process and treatment process  Description: Define diabetes and identify own type of diabetes; list 3 options for treating diabetes. Outcome: Progressing Towards Goal  Goal: *Incorporating nutritional management into lifestyle  Description: Describe effect of type, amount and timing of food on blood glucose; list 3 methods for planning meals. Outcome: Progressing Towards Goal  Goal: *Incorporating physical activity into lifestyle  Description: State effect of exercise on blood glucose levels. Outcome: Progressing Towards Goal  Goal: *Developing strategies to promote health/change behavior  Description: Define the ABC's of diabetes; identify appropriate screenings, schedule and personal plan for screenings.   Outcome: Progressing Towards Goal  Goal: *Using medications safely  Description: State effect of diabetes medications on diabetes; name diabetes medication taking, action and side effects. Outcome: Progressing Towards Goal  Goal: *Monitoring blood glucose, interpreting and using results  Description: Identify recommended blood glucose targets  and personal targets. Outcome: Progressing Towards Goal  Goal: *Prevention, detection, treatment of acute complications  Description: List symptoms of hyper- and hypoglycemia; describe how to treat low blood sugar and actions for lowering  high blood glucose level. Outcome: Progressing Towards Goal  Goal: *Prevention, detection and treatment of chronic complications  Description: Define the natural course of diabetes and describe the relationship of blood glucose levels to long term complications of diabetes. Outcome: Progressing Towards Goal  Goal: *Developing strategies to address psychosocial issues  Description: Describe feelings about living with diabetes; identify support needed and support network  Outcome: Progressing Towards Goal  Goal: *Insulin pump training  Outcome: Progressing Towards Goal  Goal: *Sick day guidelines  Outcome: Progressing Towards Goal  Goal: *Patient Specific Goal (EDIT GOAL, INSERT TEXT)  Outcome: Progressing Towards Goal     Problem: Patient Education: Go to Patient Education Activity  Goal: Patient/Family Education  Outcome: Progressing Towards Goal     Problem: Pressure Injury - Risk of  Goal: *Prevention of pressure injury  Description: Document Mike Scale and appropriate interventions in the flowsheet.   Outcome: Progressing Towards Goal  Note: Pressure Injury Interventions:  Sensory Interventions: Assess changes in LOC, Assess need for specialty bed, Avoid rigorous massage over bony prominences, Check visual cues for pain, Discuss PT/OT consult with provider, Float heels, Keep linens dry and wrinkle-free, Maintain/enhance activity level, Minimize linen layers    Moisture Interventions: Internal/External urinary devices    Activity Interventions: Increase time out of bed, Pressure redistribution bed/mattress(bed type), PT/OT evaluation    Mobility Interventions: Float heels, HOB 30 degrees or less, Pressure redistribution bed/mattress (bed type), PT/OT evaluation    Nutrition Interventions: Document food/fluid/supplement intake    Friction and Shear Interventions: Feet elevated on foot rest, Foam dressings/transparent film/skin sealants, HOB 30 degrees or less, Lift sheet, Minimize layers                Problem: Patient Education: Go to Patient Education Activity  Goal: Patient/Family Education  Outcome: Progressing Towards Goal     Problem: Falls - Risk of  Goal: *Absence of Falls  Description: Document Laverne Fall Risk and appropriate interventions in the flowsheet.   Outcome: Progressing Towards Goal  Note: Fall Risk Interventions:  Mobility Interventions: Communicate number of staff needed for ambulation/transfer, Mechanical lift, PT Consult for assist device competence, PT Consult for mobility concerns         Medication Interventions: Patient to call before getting OOB, Teach patient to arise slowly    Elimination Interventions: Call light in reach, Toilet paper/wipes in reach, Toileting schedule/hourly rounds    History of Falls Interventions: Assess for delayed presentation/identification of injury for 48 hrs (comment for end date), Room close to nurse's station, Investigate reason for fall         Problem: Patient Education: Go to Patient Education Activity  Goal: Patient/Family Education  Outcome: Progressing Towards Goal     Problem: Pain  Goal: *Control of Pain  Outcome: Progressing Towards Goal  Goal: *PALLIATIVE CARE:  Alleviation of Pain  Outcome: Progressing Towards Goal     Problem: Patient Education: Go to Patient Education Activity  Goal: Patient/Family Education  Outcome: Progressing Towards Goal     Problem: Patient Education: Go to Patient Education Activity  Goal: Patient/Family Education  Outcome: Progressing Towards Goal     Problem: Patient Education: Plix to Patient Education Activity  Goal: Patient/Family Education  Outcome: Progressing Towards Goal

## 2023-02-04 NOTE — CONSULTS
CRITICAL CARE ADMISSION NOTE      Name: Hanna Ingram   : 1950   MRN: [id number]   Date: 2/3/2023      Reason for ICU Admission: Acute on chronic HFrEF, LVAD workup     ICU PROBLEM LIST   Acute on chronic HFrEF (20%) NYHA IIIb/IV (D) on home inotropic support, life vest  TANNER on CKD3  Aflutter w/ RVR  Acute on chronic hypoxemic respiratory failure  CAP  CAD  Gastric neuroendocrine tumor  Hx of LUE DVT  DM  PAD  TRISTAN  BPH w/ urinary retention requiring chronic donnelly    HISTORY OF PRESENT ILLNESS:   Pt is a 71 y.o M w/ PMH as noted above admitted to [facility] on  due to ongoing symptoms secondary to his HFrEF. Treated for possible CAP, and diuresed for acute on chronic HFrEF. Nephrology following for TANNER on CKD 3. AHF team recommended transfer to CVICU for [facility] placement and ongoing LVAD workup. 24 HOUR EVENTS:   Transffered to CVICU, PAC placed w/ noted elevated PWP. Pt otherwise in NAD. CXR w/ increased vascular congestion. NEUROLOGICAL:    Mentation appropriate  Delirium precautions  Encourage work w/ PT/OT    PULMONOLOGY:   O2 per NC PRN for spO2 >92%    CARDIOVASCULAR:   Resume diuresis given vascular congestion on imaging and PAC values  Goal euvolemia to slight net neg status  C/w milrinone  Telemetry, close hemodynamic monitoring  Unable to tolerate ARNI/ARB due to , aldactone held 2/2 elevated K   BB held today prior to transfer to CVICU  C/w ASA, amiodarone, statin  Life Vest  Ongoing LVAD workup by AHF team  Monitor for signs of hypoperfusion, monitor UOP    GASTROINTESTINAL:   PPI  Cardiac, renal, diabetic diet as tolerated      RENAL/ELECTROLYTE/FLUIDS:   Strict I/Os  Nephrology following  Monitor RFP  Mg/Phos/K >2/3/4  Donnelly to remain in place    ENDOCRINE:   SSI, Basal insulin  Hypoglycemic protocol  Glucose goal 120-180    HEMATOLOGY/ONCOLOGY:   On eliquis  EPO weekly  Gastric neuroendocrine tumor, well differentiated, good prognosis per onc.    Not barrier to LVAD implant    ID/MICRO: S/p CAP treatment     ICU DAILY CHECKLIST     Code Status:Full  DVT Prophylaxis:Eliquis  T/L/D: PIV, PAC, Vasquez  SUP: PPI  Diet: ADAT  Activity Level:ad jose f  ABCDEF Bundle/Checklist Completed:Yes  Disposition: Stay in ICU  Multidisciplinary Rounds Completed:  Pending  Patient/Family Updated: Yes    Tatiana   2/3 Transferred to CVICU for [facility] placement    SUBJECTIVE:     As noted above    Review of Systems:     Review of Systems   Constitutional:  Positive for malaise/fatigue. Negative for chills and fever. HENT:  Negative for congestion and sinus pain. Eyes:  Negative for blurred vision, double vision and pain. Respiratory:  Positive for shortness of breath. Negative for hemoptysis and sputum production. Cardiovascular:  Negative for chest pain, palpitations and leg swelling. Gastrointestinal:  Negative for abdominal pain, nausea and vomiting. Genitourinary:  Negative for dysuria, frequency and urgency. Musculoskeletal:  Negative for back pain, joint pain and myalgias. Skin:  Negative for itching and rash. Neurological:  Negative for tremors, sensory change, speech change and focal weakness. Psychiatric/Behavioral:  Negative for hallucinations. The patient is not nervous/anxious. Past Medical History:      has a past medical history of CAD (coronary artery disease) (11/10/2016), Deafness (10/28/2012), DM (diabetes mellitus) ([facility] [address] Elevated cholesterol, Hypertension, and NSTEMI (non-ST elevated myocardial infarction) ([facility] [address] (11/10/2016). Past Surgical History:      has a past surgical history that includes hx appendectomy; pr unlisted procedure cardiac surgery (11/11/2016); colonoscopy (N/A, 6/28/2018); and colonoscopy (N/A, 1/18/2023). Home Medications:     Prior to Admission medications    Medication Sig Start Date End Date Taking? Authorizing Provider   polyethylene glycol (Miralax) 17 gram/dose powder Take 17 g by mouth daily as needed for Constipation. Yes Provider, Historical   furosemide (LASIX) 20 mg tablet Take 1 Tablet by mouth daily as needed (for weight greater than 120 lb by 2-3 lb or shortness of breath). 1/23/23  Yes Divine Linton NP   milrinone (PRIMACOR) 20 mg/100 mL infusion 0.2 mcg/kg/min by IntraVENous route continuous. infuse at 0.2mcg/kg/min with dosage weight of 52kgs continuously   Yes Jan Mckeon MD   finasteride (PROSCAR) 5 mg tablet Take 1 Tablet by mouth daily (with dinner) for 30 days. 1/19/23 2/18/23 Yes Belinda Ndiaye MD   pantoprazole (PROTONIX) 40 mg tablet Take 1 Tablet by mouth Daily (before breakfast) for 30 days. 1/20/23 2/19/23 Yes Belinda Ndiaye MD   metFORMIN (GLUCOPHAGE) 500 mg tablet Take 1 Tablet by mouth two (2) times daily (with meals) for 30 days. 1/19/23 2/18/23 Yes Belinda Ndiaye MD   glipiZIDE (GLUCOTROL) 5 mg tablet Take 1 tablet by mouth twice daily 12/2/22  Yes Berny Cedeno MD   ipratropium (ATROVENT) 21 mcg (0.03 %) nasal spray 2 Sprays by Both Nostrils route every twelve (12) hours. 11/21/22  Yes Berny Cedeno MD   ergocalciferol (ERGOCALCIFEROL) 1,250 mcg (50,000 unit) capsule Take 1 Capsule by mouth every seven (7) days. Patient taking differently: Take 50,000 Units by mouth every seven (7) days. Mondays 10/14/22  Yes Berny Cedeno MD   Januvia 50 mg tablet Take 1 tablet by mouth once daily 9/23/22  Yes Berny Cedeno MD   rosuvastatin (CRESTOR) 20 mg tablet Take 1 Tablet by mouth nightly. 2/28/22  Yes Nathen Thomas MD   aspirin delayed-release 81 mg tablet Take 81 mg by mouth daily. Yes Provider, Historical   zolpidem (AMBIEN) 5 mg tablet Take 1 Tablet by mouth nightly as needed for Sleep. Max Daily Amount: 5 mg. 1/24/23   Berny Cedeno MD   nitroglycerin (NITROSTAT) 0.4 mg SL tablet 1 Tablet by SubLINGual route every five (5) minutes as needed for Chest Pain. Up to 3 doses.  11/16/22   Jan Mckeon MD       Allergies/Social/Family History:     No Known Allergies   Social History     Tobacco Use    Smoking status: Never     Passive exposure: Never    Smokeless tobacco: Never   Substance Use Topics    Alcohol use: Yes     Alcohol/week: 2.0 standard drinks     Types: 1 Cans of beer, 1 Shots of liquor per week     Comment: rarely      Family History   Problem Relation Age of Onset    Heart Disease Father     Heart Attack Father     Hypertension Mother     Elevated Lipids Brother     Elevated Lipids Brother     No Known Problems Sister     Elevated Lipids Brother     No Known Problems Son     No Known Problems Daughter     Anesth Problems Neg Hx          OBJECTIVE:     Labs and Data: Reviewed 02/03/23  Medications: Reviewed 02/03/23  Imaging: Reviewed 02/03/23    Physical Exam  Vitals and nursing note reviewed. Constitutional:       General: He is not in acute distress. Appearance: Normal appearance. Comments: Thin   HENT:      Head: Normocephalic and atraumatic. Nose: Nose normal. No congestion. Mouth/Throat:      Mouth: Mucous membranes are moist.      Pharynx: Oropharynx is clear. No oropharyngeal exudate. Eyes:      General: No scleral icterus. Extraocular Movements: Extraocular movements intact. Conjunctiva/sclera: Conjunctivae normal.      Pupils: Pupils are equal, round, and reactive to light. Neck:      Comments: [facility]  Cardiovascular:      Rate and Rhythm: Normal rate and regular rhythm. Pulses: Normal pulses. Heart sounds: Normal heart sounds. Pulmonary:      Effort: Pulmonary effort is normal. No respiratory distress. Breath sounds: Rales present. No wheezing. Genitourinary:     Comments: Pedro  Musculoskeletal:         General: Normal range of motion. Cervical back: Normal range of motion. No rigidity. Right lower leg: No edema. Left lower leg: No edema. Skin:     General: Skin is warm and dry. Capillary Refill: Capillary refill takes less than 2 seconds.       Coloration: Skin is not jaundiced. Findings: No bruising. Neurological:      General: No focal deficit present. Mental Status: He is alert and oriented to person, place, and time. Mental status is at baseline. Cranial Nerves: No cranial nerve deficit. Motor: No weakness. Psychiatric:         Mood and Affect: Mood normal.         Behavior: Behavior normal.         Thought Content: Thought content normal.        Visit Vitals  /67   Pulse (!) 102   Temp 97.6 °F (36.4 °C)   Resp (!) 35   Ht 5' 9\" (1.753 m)   Wt 54.9 kg (121 lb)   SpO2 95%   BMI 17.87 kg/m²    O2 Flow Rate (L/min): 3 l/min O2 Device: Nasal cannula Temp (24hrs), Av.8 °F (36.6 °C), Min:97.6 °F (36.4 °C), Max:98.1 °F (36.7 °C)    CVP (mmHg): 9 mmHg (23 1800)      Intake/Output:     Intake/Output Summary (Last 24 hours) at 2/3/2023 1904  Last data filed at 2/3/2023 1525  Gross per 24 hour   Intake 1159.92 ml   Output 700 ml   Net 459.92 ml       Imaging    23    ECHO ADULT FOLLOW-UP OR LIMITED 2023    Interpretation Summary    Left Ventricle: EF by visual approximation is 20%. Left ventricle is mildly dilated. Mitral Valve: Moderately thickened leaflet, at the anterior and posterior leaflets. Moderately calcified leaflet. Moderate regurgitation. Left Atrium: Left atrium is moderately dilated. Technical qualifiers: Echo study was technically difficult with poor endocardial visualization. Contrast used: Definity. Limited study, not all structures viewed    Signed by: Aubrie Hearn MD on 2023  4:39 PM       Pertinent imaging reviewed and interpreted as noted above    CRITICAL CARE DOCUMENTATION  I had a face to face encounter with the patient, reviewed and interpreted patient data including clinical events, labs, images, vital signs, I/O's, and examined patient.   I have discussed the case and the plan and management of the patient's care with the consulting services, the bedside nurses and the respiratory therapist.      NOTE OF PERSONAL INVOLVEMENT IN CARE   This patient has a high probability of imminent, clinically significant deterioration, which requires the highest level of preparedness to intervene urgently. I participated in the decision-making and personally managed or directed the management of the following life and organ supporting interventions that required my frequent assessment to treat or prevent imminent deterioration. I personally spent 35 minutes of critical care time. This is time spent at this critically ill patient's bedside actively involved in patient care as well as the coordination of care. This does not include any procedural time which has been billed separately. Walker Hoffman MD  Staff [facility]

## 2023-02-04 NOTE — PROGRESS NOTES
LVAD work up ongoing. Oncology will sign off at this time. Please re-consult Oncology after LVAD (if pursued) for further work up and management of NET.

## 2023-02-04 NOTE — PROGRESS NOTES
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 During LVAD eval, found to have well-differentiated neuroendocrine tumor on gastric body and gastric polyp, G1; with GI, Oncology and Palliative following. Per Oncology- this may be a very treatable tumor with good prognosis and thus would not preclude LVAD. No absolute contraindications to LVAD at this point, now with good prognosis from Oncology (see their note for details); continuing with LVAD eval, will be presented at [facility] this afternoon for decision on LVAD.      Consider additional inotropic agent vs impella to monitor renal fxn and optimize pre-op        RECOMMENDATIONS:  Continued hemodynamic monitoring  Continue milrinone 0.3mcg/kg/min   May need addition of dobutamine to optimize renal function   Hold toprol due to hypotension, concern for poor renal perfusion contributing to TANNER on CKD  Cannot tolerate ARB/ARNi due to hypotension  BP responded well after holding flomax  Cannot tolerate spironolactone due to hyperkalemia  Cannot tolerate jardiance due to significant diuresis on smallest dose/contributed to IVVD  Discontinued corlanor due to SVT  Digoxin stopped d/t risk for toxicity with amio loading; monitoring level  Continue amiodarone, appreciate EP cardiology recs  Hold lasix and potassium; montior renal function  Appreciate nephrology recommendations   Keep K+ >4 and Mg >2  Venofer 200mg x 2 doses  Transfuse to keep hgb closer to 8 for potential surgical procedure  Will give 1 unit PRBC today  Abx/treatment of suspected PNA per hospitalist  Continue lifevest  Continue baby aspirin   Plavix previously stopped, okayed by Dr. Ricardo Wise consult pending (high risk for TRISTAN)  Heel pressure ulcer- wound care consulted  VAD evaluation completed  Accepted for VAD implant if renal function stabilizes      All other care per primary team      INTERVAL HISTORY:  Moved to [facility]  PA cath placed   Milrinone increased to 0.3  Hgb trending down to 7.1  Cr holding around 2-2.1  Net neg 500ml  Pro BNP trending up  Pt ambulating around unit, continues to have DONALDSON       IMPRESSION:  Acute on chronic combined systolic/diastolic  heart failure  Stage D, NYHA class IIIB/IV symptoms   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, LVEF 25-30% and 23% (by cMRI 1/3/23)  Cardiac MRI suggestive of ischemic cardiomyopathy  PYP equivocal  Chronic milrinone infusion as palliation   Coronary artery disease  CAD s/p CABG x 2: further disease best managed medically due to small vessel size   At risk of sudden cardiac death  Peripheral arterial disease  Bilateral hydronephrosis s/p donnelly  Cardiac risk factors:  HTN  HL  TRISTAN, STOP-BANG 4  DM2  CKD, stage 3  MOCA from 1/4/22 17/30, consistent with mild cognitive impairment  Hard of hearing  Gastric Neuroendocrine Tumor  Sepsis, unclear source         LIFE GOALS:  Lifestyle goals reviewed with the patient.   Patient's personal goals include: having a few more years with family  Important upcoming milestones or family events: None at this time   The patient identifies the following as important for living well: being at home, not being SOB              CARDIAC IMAGING:  Echo 1/27/23    Left Ventricle: EF by visual approximation is 20%. Left ventricle is mildly dilated. Mitral Valve: Moderately thickened leaflet, at the anterior and posterior leaflets. Moderately calcified leaflet. Moderate regurgitation. Left Atrium: Left atrium is moderately dilated. Technical qualifiers: Echo study was technically difficult with poor endocardial visualization. Contrast used: Definity. Limited study, not all structures viewed    Echo 1/9/23   Left Ventricle: Severely reduced left ventricular systolic function with a visually estimated EF of 25 - 30%. Left ventricle size is normal. Mildly increased wall thickness. Echo 12/26/22    Left Ventricle: Severely reduced left ventricular systolic function with a visually estimated EF of 25 - 30%. Left ventricle size is normal. Normal wall thickness. There are regional wall motion abnormalities. Grade II diastolic dysfunction with increased LAP. Right Ventricle: Moderately reduced systolic function. TAPSE is abnormal. TAPSE is 1.1 cm. Aortic Valve: Mild stenosis of the aortic valve. AV peak gradient is 13 mmHg. AV peak velocity is 1.8 m/s. Mitral Valve: Not well visualized. Moderate annular calcification at the posterior leaflet of the mitral valve. Mild to moderate regurgitation. Tricuspid Valve: Mildly elevated RVSP. Left Atrium: Left atrium is moderately dilated. 12/8/22    Left Ventricle: Moderately reduced left ventricular systolic function with a visually estimated EF of 35 - 40%. Severe hypokinesis of the following segments: mid anteroseptal, apical anterior, apical septal, apical inferior and apical lateral. Severe hypokinesis of the apex. Mitral Valve: Severely thickened leaflet, at the anterior and posterior leaflets.  Severely calcified leaflet, at the anterior and posterior leaflets. Mild annular calcification of the mitral valve. Moderate regurgitation. Left Atrium: Left atrium is mildly dilated. Contrast used: Definity. limited study     EKG 12/22/22 ST, Biatria enlargement, marked ST abnormality     [facility] 12/6/22  1. Normal LVEDP  2. Severe native multivessel coronary artery disease  3. Patent LIMA to LAD and vein graft to distal RCA  4. Recurrent ISR in OM1 stent with now 60 to 70% restenosis  5. Recoil of left main and circumflex stent with now recurrent 40 to 50% stenosis. 6.  Progression of ostial left main disease now to about 60% stenosis  7. Progression of disease in jailed first marginal branch now with diffuse 90% stenosis  8. High-grade stenosis in the mid to distal right potential femoral artery treated with 6 x 40 mm impact drug-coated balloon angioplasty to reduce the stenosis to less than 40%     NST        HEMODYNAMICS:  C 1/9/23  PA 20/9, RA 3, PCWP 8, CI 1.8     CPEST too ill   6MW 300 feet       HPI:  71 y.o. male who was admitted via ED for worsening SOB, poor appetite, orthopnea and fatigue. Pmhx of CAD s/p CABG, PAD, DM 2, recent admission for HFmrEF earlier this month. Serial TTEs show progressive decline in EF since 3/2021 with the most recent EF at 25-30%. Recent LHC shows diffuse CAD and no interventions indicated. Patient had Malathi Hui recently and there is some thought to myocarditis or other etiology causing worsening HF. [facility] was consulted for further evaluation and management of HFrEF. REVIEW OF SYSTEMS:  Review of Systems   Constitutional:  Positive for malaise/fatigue. Negative for chills and fever. Respiratory:  Positive for shortness of breath. Negative for cough. Cardiovascular:  Negative for chest pain, palpitations, orthopnea and leg swelling. Gastrointestinal:  Negative for abdominal pain, heartburn, nausea and vomiting.    Genitourinary:         Poor appetite   Musculoskeletal:  Negative for falls. Neurological:  Positive for weakness. Negative for dizziness. PHYSICAL EXAM:  Visit Vitals  BP (!) 112/58   Pulse 99   Temp 98.9 °F (37.2 °C)   Resp (!) 32   Ht 5' 9\" (1.753 m)   Wt 124 lb 1.9 oz (56.3 kg)   SpO2 94%   BMI 18.33 kg/m²     Hemodynamics:   CO: CO (l/min): 3.7 l/min   CI: CI (l/min/m2): 2.2 l/min/m2   CVP: CVP (mmHg): 6 mmHg (02/04/23 0700)   SVR: SVR (dyne*sec)/cm5: 1153 (dyne*sec)/cm5 (02/04/23 9193)   PAP Systolic: PAP Systolic: 67 (37/56/92 7917)   PAP Diastolic: PAP Diastolic: 25 (82/77/63 0764)   PVR:     SV02: SVO2 (%): 67 % (02/04/23 0700)   SCV02:        Physical Exam  Vitals and nursing note reviewed. Constitutional:       General: He is not in acute distress. Appearance: Normal appearance. Cardiovascular:      Rate and Rhythm: Normal rate and regular rhythm. Pulses: Normal pulses. Heart sounds: Normal heart sounds. No murmur heard. No friction rub. No gallop. Pulmonary:      Effort: Pulmonary effort is normal. No respiratory distress. Breath sounds: Normal breath sounds. Abdominal:      General: Bowel sounds are normal. There is no distension. Palpations: Abdomen is soft. Tenderness: There is no abdominal tenderness. Musculoskeletal:      Right lower leg: No edema. Left lower leg: No edema. Skin:     General: Skin is warm and dry. Capillary Refill: Capillary refill takes less than 2 seconds. Neurological:      General: No focal deficit present. Mental Status: He is alert and oriented to person, place, and time. Psychiatric:         Mood and Affect: Mood normal.         Behavior: Behavior normal.         Thought Content:  Thought content normal.         Judgment: Judgment normal.            PAST MEDICAL HISTORY:  Past Medical History:   Diagnosis Date    CAD (coronary artery disease) 11/10/2016    NSTEMI & 2 stents    Deafness 10/28/2012    DM (diabetes mellitus) (HCC)     Elevated cholesterol     Hypertension NSTEMI (non-ST elevated myocardial infarction) ([facility] [address] 11/10/2016       PAST SURGICAL HISTORY:  Past Surgical History:   Procedure Laterality Date    COLONOSCOPY N/A 6/28/2018    COLONOSCOPY performed by Piotr Davalos MD at [facility] ENDOSCOPY    COLONOSCOPY N/A 1/18/2023    COLONOSCOPY performed by Jose Selby MD at [facility] ENDOSCOPY    [address]  11/11/2016    2 stents       FAMILY HISTORY:  Family History   Problem Relation Age of Onset    Heart Disease Father     Heart Attack Father     Hypertension Mother     Elevated Lipids Brother     Elevated Lipids Brother     No Known Problems Sister     Elevated Lipids Brother     No Known Problems Son     No Known Problems Daughter     Anesth Problems Neg Hx        SOCIAL HISTORY:  Social History     Socioeconomic History    Marital status:    Tobacco Use    Smoking status: Never     Passive exposure: Never    Smokeless tobacco: Never   Vaping Use    Vaping Use: Never used   Substance and Sexual Activity    Alcohol use: Yes     Alcohol/week: 2.0 standard drinks     Types: 1 Cans of beer, 1 Shots of liquor per week     Comment: rarely    Drug use: No    Sexual activity: Yes     Social Determinants of Health     Financial Resource Strain: Medium Risk    Difficulty of Paying Living Expenses: Somewhat hard   Food Insecurity: Food Insecurity Present    Worried About Running Out of Food in the Last Year: Never true    Ran Out of Food in the Last Year: Often true       LABORATORY RESULTS:     Labs Latest Ref Rng & Units 2/4/2023 2/3/2023 2/2/2023 2/1/2023 1/31/2023 1/30/2023 1/29/2023   WBC 4.1 - 11.1 K/uL 5.3 5.6 6.8 6.3 7.6 8.0 8.8   RBC 4.10 - 5.70 M/uL 2.93(L) 3.00(L) 3.32(L) 3.27(L) 3.66(L) 3.48(L) 3.52(L)   Hemoglobin 12.1 - 17.0 g/dL 7. 1(L) 7. 5(L) 8. 3(L) 8.0(L) 9.1(L) 8.5(L) 8.7(L)   Hematocrit 36.6 - 50.3 % 25. 3(L) 25. 8(L) 28. 7(L) 27. 7(L) 30. 6(L) 29. 5(L) 29. 3(L)   MCV 80.0 - 99.0 FL 86.3 86.0 86.4 84.7 83.6 84.8 83.2 Platelets 887 - 332 K/uL 257 259 304 271 307 297 270   Lymphocytes 12 - 49 % - 17 20 21 20 23 33   Monocytes 5 - 13 % - 9 10 10 9 10 10   Eosinophils 0 - 7 % - 1 2 3 3 1 3   Basophils 0 - 1 % - 1 1 1 1 1 1   Albumin 3.5 - 5.0 g/dL 2. 9(L) 3.0(L) - - 3. 1(L) - -   Calcium 8.5 - 10.1 MG/DL 8.6 9.1 9.1 9.0 9.1 9.0 8.8   Glucose 65 - 100 mg/dL 140(H) 129(H) 142(H) 94 221(H) 261(H) 280(H)   BUN 6 - 20 MG/DL 36(H) 42(H) 40(H) 33(H) 34(H) 27(H) 24(H)   Creatinine 0.70 - 1.30 MG/DL 2.16(H) 2.12(H) 2.01(H) 2.08(H) 2.09(H) 1.88(H) 1.78(H)   Sodium 136 - 145 mmol/L 137 137 135(L) 136 131(L) 131(L) 132(L)   Potassium 3.5 - 5.1 mmol/L 4.8 4.6 4.9 4.9 5.4(H) 4.8 4.4   TSH 0.36 - 3.74 uIU/mL - - - - - - -   PSA 0.01 - 4.0 ng/mL - - - - - - -   LDH 85 - 241 U/L - - - - - - -   Some recent data might be hidden     Lab Results   Component Value Date/Time    TSH 3.52 01/28/2023 05:26 AM    TSH 2.12 12/27/2022 02:36 PM    TSH 4.80 (H) 12/06/2022 03:53 AM    TSH 5.39 (H) 10/12/2022 09:10 AM    TSH 3.53 02/03/2022 11:47 AM    TSH 5.790 (H) 11/21/2019 04:45 PM    TSH 3.08 06/22/2018 01:53 PM    TSH 4.250 05/26/2015 09:43 AM       ALLERGY:  No Known Allergies     CURRENT MEDICATIONS:    Current Facility-Administered Medications:     iron sucrose (VENOFER) 200 mg in 0.9% sodium chloride 100 mL IVPB, 200 mg, IntraVENous, DAILY, Shaila, Lizette B, NP    milrinone (PRIMACOR) 20 MG/100 ML D5W infusion, 0.3 mcg/kg/min, IntraVENous, CONTINUOUS, Shaila, Lizette B, NP    0.9% sodium chloride infusion 250 mL, 250 mL, IntraVENous, PRN, Shaila, Lizette B, NP    insulin glargine (LANTUS) injection 10 Units, 10 Units, SubCUTAneous, DAILY, Cathy Sheldon, CNS, 10 Units at 02/03/23 1001    alteplase (CATHFLO) 1 mg in sterile water (preservative free) 1 mL injection, 1 mg, InterCATHeter, PRN, Shaila, Lizette B, NP    bacitracin 500 unit/gram packet 1 Packet, 1 Packet, Topical, PRN, Shaila, Lizette B, NP    bumetanide (BUMEX) injection 1 mg, 1 mg, IntraVENous, Q4H PRN, Lizette Linton NP    epoetin heidy-epbx (RETACRIT) injection 10,000 Units, 10,000 Units, SubCUTAneous, Q7D, Jadiel Galvan MD, 10,000 Units at 02/02/23 2213    senna-docusate (PERICOLACE) 8.6-50 mg per tablet 1 Tablet, 1 Tablet, Oral, DAILY PRN, Brian STALEY NP    polyethylene glycol (MIRALAX) packet 17 g, 17 g, Oral, DAILY PRN, Brian STALEY NP    insulin lispro (HUMALOG) injection 6 Units, 6 Units, SubCUTAneous, TID WITH MEALS, Cathy Sheldon, CNS, 6 Units at 02/03/23 1220    traZODone (DESYREL) tablet 50 mg, 50 mg, Oral, QHS PRN, Key Bedoya MD, 50 mg at 02/03/23 2134    apixaban (ELIQUIS) tablet 10 mg, 10 mg, Oral, BID, 10 mg at 02/03/23 2023 **FOLLOWED BY** [START ON 2/6/2023] apixaban (ELIQUIS) tablet 5 mg, 5 mg, Oral, BID, Key Bedoya MD    glucose chewable tablet 16 g, 4 Tablet, Oral, PRN, Ita Harvey NP    glucagon (GLUCAGEN) injection 1 mg, 1 mg, IntraMUSCular, PRN, Ita Harvey NP    dextrose 10 % infusion 0-250 mL, 0-250 mL, IntraVENous, PRN, Ita Harvey NP    insulin lispro (HUMALOG) injection, , SubCUTAneous, AC&HS, Ita Harvey NP, 2 Units at 02/03/23 2147    balsam peru-castor oiL (VENELEX) ointment, , Topical, BID, Fallon Alcantara MD, Given at 02/03/23 2023    [Held by provider] metoprolol succinate (TOPROL-XL) XL tablet 12.5 mg, 12.5 mg, Oral, Q12H, Jo Jaffe MD, 12.5 mg at 02/02/23 2200    lidocaine 4 % patch 1 Patch, 1 Patch, TransDERmal, Q24H, Key Bedoya MD, 1 Patch at 02/03/23 1221    amiodarone (CORDARONE) tablet 400 mg, 400 mg, Oral, BID, Lisset Wilkerson MD, 400 mg at 02/03/23 2024    aspirin delayed-release tablet 81 mg, 81 mg, Oral, DAILY, Heber Barajas MD, 81 mg at 02/03/23 1005    sodium chloride (NS) flush 5-40 mL, 5-40 mL, IntraVENous, Q8H, Heber Barajas MD, 10 mL at 02/04/23 0636    sodium chloride (NS) flush 5-40 mL, 5-40 mL, IntraVENous, PRN, Heber Barajas MD    acetaminophen (TYLENOL) tablet 650 mg, 650 mg, Oral, Q6H PRN, 650 mg at 02/02/23 2212 **OR** acetaminophen (TYLENOL) suppository 650 mg, 650 mg, Rectal, Q6H PRN, Heber Barajas MD    polyethylene glycol (MIRALAX) packet 17 g, 17 g, Oral, DAILY PRN, Heber Barajas MD, 17 g at 02/02/23 1539    ondansetron (ZOFRAN ODT) tablet 4 mg, 4 mg, Oral, Q8H PRN, 4 mg at 01/30/23 1357 **OR** ondansetron (ZOFRAN) injection 4 mg, 4 mg, IntraVENous, Q6H PRN, Heber Barajas MD, 4 mg at 02/03/23 1926    heparin (porcine) pf 500 Units, 500 Units, InterCATHeter, PRN, Heber Barajas MD    pantoprazole (PROTONIX) tablet 40 mg, 40 mg, Oral, ACB, Heber Barajas MD, 40 mg at 02/04/23 0636    rosuvastatin (CRESTOR) tablet 20 mg, 20 mg, Oral, QHS, Heber Barajas MD, 20 mg at 02/03/23 2134    heparin (porcine) pf 500 Units, 500 Units, InterCATHeter, DAILY, Heber Barajas MD, 500 Units at 02/01/23 0902    PATIENT CARE TEAM:  Patient Care Team:  Irina Alcantara MD as PCP - General (Family Medicine)  Irina Alcantara MD as PCP - [address] [place] [address] Provider  Kvein Cox MD (Cardiovascular Disease Physician)  Audrey Rodríguez MD (Gastroenterology)  Pablo Junior MD (Cardiothoracic Surgery)  Shira Toney MD (Cardiovascular Disease Physician)  Lavell Brannon MD (Nephrology)  Bre Viera RN as Care Transitions Nurse  Pritesh Valenzuela MD (Pulmonary Disease)  Parker Motley MD ([address] [place] [address] Vascular Surgery)     Thank you for allowing me to participate in this patient's care. Fabián Mendez NP   [address] [place] [address], [address]  Phone: [phone]    Total Critical Care time spent:    45 minutes. There was no overlap with other services        Critical care was necessary to treat or prevent imminent or life threatening deterioration of the following conditions: cardiac failure, respiratory failure and CNS failure or compromise     Services Provided:  1.  Telemetry review and 12 lead ECG interpretation  2. Hemodynamic interpretation, assessment, and management  3. Review and interpretation of CXR  4. Review and interpretation of lab values  5. Review and interpretation of microbiologic data and culture results  6. Review of medications and administration  7. Review and interpretation of nutrition requirements and management  8. Discussion of management withother consultants and services  9.  Clinical update to family members

## 2023-02-04 NOTE — PROGRESS NOTES
CRITICAL CARE ADMISSION NOTE      Name: Jem Brain   : 1950   MRN: [id number]   Date: 2023      Reason for ICU Admission: Acute on chronic HFrEF, LVAD workup     ICU PROBLEM LIST   Acute on chronic HFrEF (20%) NYHA IIIb/IV (D) on home inotropic support, life vest  TANNER on CKD3  Aflutter w/ RVR  Acute on chronic hypoxemic respiratory failure  CAP  CAD  Gastric neuroendocrine tumor  Hx of LUE DVT  DM  PAD  TRISTAN  BPH w/ urinary retention requiring chronic donnelly    HISTORY OF PRESENT ILLNESS:   Pt is a 71 y.o M w/ PMH as noted above admitted to [facility] on  due to ongoing symptoms secondary to his HFrEF. Treated for possible CAP, and diuresed for acute on chronic HFrEF. Nephrology following for TANNER on CKD 3. AHF team recommended transfer to CVICU for [facility] placement and ongoing LVAD workup. 24 HOUR EVENTS:   Transffered to CVICU, PAC placed w/ noted elevated PWP. Pt otherwise in NAD. CXR w/ increased vascular congestion. NEUROLOGICAL:    Mentation appropriate  Delirium precautions  Encourage work w/ PT/OT    PULMONOLOGY:   O2 per NC PRN for spO2 >92%    CARDIOVASCULAR:   Resume diuresis given vascular congestion on imaging and PAC values  Goal net negative  C/w milrinone  Telemetry, close hemodynamic monitoring  Unable to tolerate ARNI/ARB due to , aldactone held 2/2 elevated K   BB held today prior to transfer to CVICU  C/w ASA, amiodarone, statin  Life Vest  Ongoing LVAD workup by AHF team  Monitor for signs of hypoperfusion, monitor UOP    GASTROINTESTINAL:   PPI  Cardiac, renal, diabetic diet as tolerated      RENAL/ELECTROLYTE/FLUIDS:   Strict I/Os  Nephrology following  Monitor RFP  Mg/Phos/K >2/3/4  Donnelly to remain in place    ENDOCRINE:   SSI, Basal insulin  Hypoglycemic protocol  Glucose goal 120-180    HEMATOLOGY/ONCOLOGY:   On eliquis  EPO weekly  Gastric neuroendocrine tumor, well differentiated, good prognosis per onc.    Not barrier to LVAD implant    ID/MICRO:   S/p CAP treatment ICU DAILY CHECKLIST     Code Status:Full  DVT Prophylaxis:Eliquis  T/L/D: PIV, PAC, Vasquez  SUP: PPI  Diet: ADAT  Activity Level:ad jose f  ABCDEF Bundle/Checklist Completed:Yes  Disposition: Stay in ICU  Multidisciplinary Rounds Completed:  Pending  Patient/Family Updated: Yes    Tatiana   2/3 Transferred to CVICU for [facility] placement    SUBJECTIVE:     As noted above    Review of Systems:     Review of Systems   Constitutional:  Positive for malaise/fatigue. Negative for chills and fever. HENT:  Negative for congestion and sinus pain. Eyes:  Negative for blurred vision, double vision and pain. Respiratory:  Positive for shortness of breath. Negative for hemoptysis and sputum production. Cardiovascular:  Negative for chest pain, palpitations and leg swelling. Gastrointestinal:  Negative for abdominal pain, nausea and vomiting. Genitourinary:  Negative for dysuria, frequency and urgency. Musculoskeletal:  Negative for back pain, joint pain and myalgias. Skin:  Negative for itching and rash. Neurological:  Negative for tremors, sensory change, speech change and focal weakness. Psychiatric/Behavioral:  Negative for hallucinations. The patient is not nervous/anxious. Past Medical History:      has a past medical history of CAD (coronary artery disease) (11/10/2016), Deafness (10/28/2012), DM (diabetes mellitus) ([facility] [address] Elevated cholesterol, Hypertension, and NSTEMI (non-ST elevated myocardial infarction) ([facility] [address] (11/10/2016). Past Surgical History:      has a past surgical history that includes hx appendectomy; pr unlisted procedure cardiac surgery (11/11/2016); colonoscopy (N/A, 6/28/2018); and colonoscopy (N/A, 1/18/2023). Home Medications:     Prior to Admission medications    Medication Sig Start Date End Date Taking? Authorizing Provider   polyethylene glycol (Miralax) 17 gram/dose powder Take 17 g by mouth daily as needed for Constipation.    Yes Provider, Historical   furosemide (LASIX) 20 mg tablet Take 1 Tablet by mouth daily as needed (for weight greater than 120 lb by 2-3 lb or shortness of breath). 1/23/23  Yes Mike Linton NP   milrinone (PRIMACOR) 20 mg/100 mL infusion 0.2 mcg/kg/min by IntraVENous route continuous. infuse at 0.2mcg/kg/min with dosage weight of 52kgs continuously   Yes Israel Jean MD   finasteride (PROSCAR) 5 mg tablet Take 1 Tablet by mouth daily (with dinner) for 30 days. 1/19/23 2/18/23 Yes Gail Ndiaye MD   pantoprazole (PROTONIX) 40 mg tablet Take 1 Tablet by mouth Daily (before breakfast) for 30 days. 1/20/23 2/19/23 Yes Gail Ndiaye MD   metFORMIN (GLUCOPHAGE) 500 mg tablet Take 1 Tablet by mouth two (2) times daily (with meals) for 30 days. 1/19/23 2/18/23 Yes Gail Ndiaye MD   glipiZIDE (GLUCOTROL) 5 mg tablet Take 1 tablet by mouth twice daily 12/2/22  Yes May Bertrand MD   ipratropium (ATROVENT) 21 mcg (0.03 %) nasal spray 2 Sprays by Both Nostrils route every twelve (12) hours. 11/21/22  Yes May Bertrand MD   ergocalciferol (ERGOCALCIFEROL) 1,250 mcg (50,000 unit) capsule Take 1 Capsule by mouth every seven (7) days. Patient taking differently: Take 50,000 Units by mouth every seven (7) days. Mondays 10/14/22  Yes May Bertrand MD   Januvia 50 mg tablet Take 1 tablet by mouth once daily 9/23/22  Yes May Bertrand MD   rosuvastatin (CRESTOR) 20 mg tablet Take 1 Tablet by mouth nightly. 2/28/22  Yes Aspen Mosley MD   aspirin delayed-release 81 mg tablet Take 81 mg by mouth daily. Yes Provider, Historical   zolpidem (AMBIEN) 5 mg tablet Take 1 Tablet by mouth nightly as needed for Sleep. Max Daily Amount: 5 mg. 1/24/23   May Bertrand MD   nitroglycerin (NITROSTAT) 0.4 mg SL tablet 1 Tablet by SubLINGual route every five (5) minutes as needed for Chest Pain. Up to 3 doses.  11/16/22   Israel Jean MD       Allergies/Social/Family History:     No Known Allergies   Social History     Tobacco Use    Smoking status: Never     Passive exposure: Never    Smokeless tobacco: Never   Substance Use Topics    Alcohol use: Yes     Alcohol/week: 2.0 standard drinks     Types: 1 Cans of beer, 1 Shots of liquor per week     Comment: rarely      Family History   Problem Relation Age of Onset    Heart Disease Father     Heart Attack Father     Hypertension Mother     Elevated Lipids Brother     Elevated Lipids Brother     No Known Problems Sister     Elevated Lipids Brother     No Known Problems Son     No Known Problems Daughter     Anesth Problems Neg Hx          OBJECTIVE:     Labs and Data: Reviewed 23  Medications: Reviewed 23  Imaging: Reviewed 23    General - chronically ill, sarcopenia  HEENT- pupils equal, EOMI, anicteric  Neuro - moves all extrem, follows basic commands, no focal deficit  CV - RRR, PAC+  Resp - rales b/l, NC  Abd - soft, nontender, no guarding  MSK- intact, no sacral ulcer      Visit Vitals  /63   Pulse (!) 101   Temp 98.1 °F (36.7 °C)   Resp (!) 36   Ht 5' 9\" (1.753 m)   Wt 56.3 kg (124 lb 1.9 oz)   SpO2 95%   BMI 18.33 kg/m²    O2 Flow Rate (L/min): 12 l/min O2 Device: Nasal cannula (mid flow) Temp (24hrs), Av.4 °F (36.9 °C), Min:97.6 °F (36.4 °C), Max:99.1 °F (37.3 °C)    CVP (mmHg): 20 mmHg (23 1400)      Intake/Output:     Intake/Output Summary (Last 24 hours) at 2023 1452  Last data filed at 2023 1431  Gross per 24 hour   Intake 728.74 ml   Output 1840 ml   Net -1111.26 ml         Imaging    23    ECHO ADULT FOLLOW-UP OR LIMITED 2023    Interpretation Summary    Left Ventricle: EF by visual approximation is 20%. Left ventricle is mildly dilated. Mitral Valve: Moderately thickened leaflet, at the anterior and posterior leaflets. Moderately calcified leaflet. Moderate regurgitation. Left Atrium: Left atrium is moderately dilated.     Technical qualifiers: Echo study was technically difficult with poor endocardial visualization. Contrast used: Definity. Limited study, not all structures viewed    Signed by: Kun Lang MD on 1/27/2023  4:39 PM       Pertinent imaging reviewed and interpreted as noted above    CRITICAL CARE DOCUMENTATION  I had a face to face encounter with the patient, reviewed and interpreted patient data including clinical events, labs, images, vital signs, I/O's, and examined patient. I have discussed the case and the plan and management of the patient's care with the consulting services, the bedside nurses and the respiratory therapist.      NOTE OF PERSONAL INVOLVEMENT IN CARE   This patient has a high probability of imminent, clinically significant deterioration, which requires the highest level of preparedness to intervene urgently. I participated in the decision-making and personally managed or directed the management of the following life and organ supporting interventions that required my frequent assessment to treat or prevent imminent deterioration. I personally spent 30 minutes of critical care time. This is time spent at this critically ill patient's bedside actively involved in patient care as well as the coordination of care. This does not include any procedural time which has been billed separately.     Cary Landaverde DO  Staff Intensivist  [facility]  2/4/2023

## 2023-02-04 NOTE — PROGRESS NOTES
Patient name: Jem Dillard  MRN: [id number]    Nephrology Progress note:    Assessment:  TANNER on CKD-3a: Suspect cardiorenal effects with needed diuresis. Hypotension/poor renal perfusion likely culprit. Recent baseline Cr has been in the low 1s. Hyperkalemia: K bump to 5.4-> 2 to oral KCL/evolving TANNER. Better s/p Lokelma dose x1     Ischemic CM: Recurrent exacerbations. EF 20%. On continuous Milrinone since last admission. Interested in LVAD-> sorting out candidacy     BPH: s/p donnelly     Well differentiated NET Grade 1: noted on EGD 1/18/23     Anemia 2 to CKD:Hgb sub-optimal     Hyponatremia: mild-> better     Left UE basilic and radial vein thrombus    Plan/Recommendations:  Creatinine slowly trending up, now 2.2  Now in CVICU for [facility] placement and continued LVAD w/u  Holding IV Lasix, but will likely need to restart soon (has vasc congestion on film and high PWP)   Holding KCl  Continuous Primacor drip   BRIGHT (last dose 2/2/23)  Appreciate oncology involvement/insight  Palliative care involvement also appreciated  Strict I/Os  Am labs         Subjective:  Remains on O2. Admits to weakness/dyspnea with limited exertion. Reports early satiety/mild nausea after eating. Oob in chair.      ROS:   no vomiting  No chest pain    Exam:  Visit Vitals  BP (!) 112/58   Pulse 99   Temp 98.9 °F (37.2 °C)   Resp (!) 32   Ht 5' 9\" (1.753 m)   Wt 56.3 kg (124 lb 1.9 oz)   SpO2 94%   BMI 18.33 kg/m²     Wt Readings from Last 3 Encounters:   02/04/23 56.3 kg (124 lb 1.9 oz)   01/25/23 55.8 kg (123 lb)   01/23/23 54.9 kg (121 lb)       Intake/Output Summary (Last 24 hours) at 2/4/2023 0744  Last data filed at 2/4/2023 0700  Gross per 24 hour   Intake 604.18 ml   Output 1140 ml   Net -535.82 ml         Gen: NAD/Frail  HEENT: AT/NC  Lungs/Chest wall: Clear. No accessory muscle use. Cardiovascular: Regular rate, normal rhythm. Abdomen/: Soft, NT, ND, BS+. +Vasquez  Ext: No peripheral edema  CNS: alert and awake.  Answers appropriately      Current Facility-Administered Medications   Medication Dose Route Frequency Last Admin    insulin glargine (LANTUS) injection 10 Units  10 Units SubCUTAneous DAILY 10 Units at 02/03/23 1001    alteplase (CATHFLO) 1 mg in sterile water (preservative free) 1 mL injection  1 mg InterCATHeter PRN      bacitracin 500 unit/gram packet 1 Packet  1 Packet Topical PRN      bumetanide (BUMEX) injection 1 mg  1 mg IntraVENous Q4H PRN      epoetin heidy-epbx (RETACRIT) injection 10,000 Units  10,000 Units SubCUTAneous Q7D 10,000 Units at 02/02/23 2213    senna-docusate (PERICOLACE) 8.6-50 mg per tablet 1 Tablet  1 Tablet Oral DAILY PRN      polyethylene glycol (MIRALAX) packet 17 g  17 g Oral DAILY PRN      insulin lispro (HUMALOG) injection 6 Units  6 Units SubCUTAneous TID WITH MEALS 6 Units at 02/03/23 1220    traZODone (DESYREL) tablet 50 mg  50 mg Oral QHS PRN 50 mg at 02/03/23 2134    apixaban (ELIQUIS) tablet 10 mg  10 mg Oral BID 10 mg at 02/03/23 2023    Followed by    Yajaira Busby ON 2/6/2023] apixaban (ELIQUIS) tablet 5 mg  5 mg Oral BID      glucose chewable tablet 16 g  4 Tablet Oral PRN      glucagon (GLUCAGEN) injection 1 mg  1 mg IntraMUSCular PRN      dextrose 10 % infusion 0-250 mL  0-250 mL IntraVENous PRN      insulin lispro (HUMALOG) injection   SubCUTAneous AC&HS 2 Units at 02/03/23 2147    balsam peru-castor oiL (VENELEX) ointment   Topical BID Given at 02/03/23 2023    [Held by provider] metoprolol succinate (TOPROL-XL) XL tablet 12.5 mg  12.5 mg Oral Q12H 12.5 mg at 02/02/23 2200    lidocaine 4 % patch 1 Patch  1 Patch TransDERmal Q24H 1 Patch at 02/03/23 1221    amiodarone (CORDARONE) tablet 400 mg  400 mg Oral  mg at 02/03/23 2024    aspirin delayed-release tablet 81 mg  81 mg Oral DAILY 81 mg at 02/03/23 1005    sodium chloride (NS) flush 5-40 mL  5-40 mL IntraVENous Q8H 10 mL at 02/04/23 0636    sodium chloride (NS) flush 5-40 mL  5-40 mL IntraVENous PRN      acetaminophen (TYLENOL) tablet 650 mg  650 mg Oral Q6H  mg at 02/02/23 2212    Or    acetaminophen (TYLENOL) suppository 650 mg  650 mg Rectal Q6H PRN      polyethylene glycol (MIRALAX) packet 17 g  17 g Oral DAILY PRN 17 g at 02/02/23 1539    ondansetron (ZOFRAN ODT) tablet 4 mg  4 mg Oral Q8H PRN 4 mg at 01/30/23 1357    Or    ondansetron (ZOFRAN) injection 4 mg  4 mg IntraVENous Q6H PRN 4 mg at 02/03/23 1926    heparin (porcine) pf 500 Units  500 Units InterCATHeter PRN      pantoprazole (PROTONIX) tablet 40 mg  40 mg Oral ACB 40 mg at 02/04/23 0636    rosuvastatin (CRESTOR) tablet 20 mg  20 mg Oral QHS 20 mg at 02/03/23 2134    milrinone (PRIMACOR) 20 MG/100 ML D5W infusion  0.3 mcg/kg/min IntraVENous CONTINUOUS 0.3 mcg/kg/min at 02/04/23 0431    heparin (porcine) pf 500 Units  500 Units InterCATHeter DAILY 500 Units at 02/01/23 0902       Labs/Data:    Lab Results   Component Value Date/Time    WBC 5.3 02/04/2023 04:42 AM    HGB 7.1 (L) 02/04/2023 04:42 AM    HCT 25.3 (L) 02/04/2023 04:42 AM    PLATELET 149 75/32/1079 04:42 AM    MCV 86.3 02/04/2023 04:42 AM       Lab Results   Component Value Date/Time    Sodium 137 02/04/2023 04:42 AM    Potassium 4.8 02/04/2023 04:42 AM    Chloride 107 02/04/2023 04:42 AM    CO2 23 02/04/2023 04:42 AM    Anion gap 7 02/04/2023 04:42 AM    Glucose 140 (H) 02/04/2023 04:42 AM    BUN 36 (H) 02/04/2023 04:42 AM    Creatinine 2.16 (H) 02/04/2023 04:42 AM    BUN/Creatinine ratio 17 02/04/2023 04:42 AM    GFR est AA 46 (L) 06/23/2022 09:40 AM    GFR est non-AA 38 (L) 06/23/2022 09:40 AM    eGFR 32 (L) 02/04/2023 04:42 AM    eGFR 69 01/25/2023 11:55 AM    Calcium 8.6 02/04/2023 04:42 AM       Patient seen and examined. Chart reviewed. Labs, data and other pertinent notes reviewed in last 24 hrs.     Discussed with patient and AHFT    Signed by:  Karley Moffett MD  [id number] [facility]

## 2023-02-05 NOTE — PROGRESS NOTES
CRITICAL CARE ADMISSION NOTE      Name: Raghu Shah   : 1950   MRN: [id number]   Date: 2023      Reason for ICU Admission: Acute on chronic HFrEF, LVAD workup     ICU PROBLEM LIST   Acute on chronic HFrEF (20%) NYHA IIIb/IV (D) on home inotropic support, life vest  TANNER on CKD3  Aflutter w/ RVR  Acute on chronic hypoxemic respiratory failure  CAP  CAD  Gastric neuroendocrine tumor  Hx of LUE DVT  DM  PAD  TRISTAN  BPH w/ urinary retention requiring chronic donnelly    HISTORY OF PRESENT ILLNESS:   Pt is a 71 y.o M w/ PMH as noted above admitted to [facility] on  due to ongoing symptoms secondary to his HFrEF. Treated for possible CAP, and diuresed for acute on chronic HFrEF. Nephrology following for TANNER on CKD 3. AHF team recommended transfer to CVICU for [facility] placement and ongoing LVAD workup. 24 HOUR EVENTS:   CVP low ovn, CI marginal. Improved with 5% albumin    NEUROLOGICAL:    Mentation appropriate  Delirium precautions  Encourage work w/ PT/OT    PULMONOLOGY:   O2 per NC PRN for spO2 >92%    CARDIOVASCULAR:   Resume diuresis given vascular congestion on imaging and PAC values  Goal net negative  C/w milrinone  Telemetry, close hemodynamic monitoring  Unable to tolerate ARNI/ARB due to , aldactone held 2/2 elevated K   BB held today prior to transfer to CVICU  C/w ASA, amiodarone, statin  Life Vest  Ongoing LVAD workup by AHF team  Monitor for signs of hypoperfusion, monitor UOP    GASTROINTESTINAL:   PPI  Cardiac, renal, diabetic diet as tolerated      RENAL/ELECTROLYTE/FLUIDS:   Strict I/Os  Nephrology following  Monitor RFP  Mg/Phos/K >2/3/4  Donnelly to remain in place  PRN bumex    ENDOCRINE:   SSI, Basal insulin  Hypoglycemic protocol  Glucose goal 120-180    HEMATOLOGY/ONCOLOGY:   On eliquis  EPO weekly  Gastric neuroendocrine tumor, well differentiated, good prognosis per onc.    Not barrier to LVAD implant    ID/MICRO:   S/p CAP treatment     ICU DAILY CHECKLIST     Code Status:Full  DVT Prophylaxis:Eliquis  T/L/D: PIV, PAC, Vasquez  SUP: PPI  Diet: ADAT  Activity Level:ad jose f  ABCDEF Bundle/Checklist Completed:Yes  Disposition: Stay in ICU  Multidisciplinary Rounds Completed:  Pending  Patient/Family Updated: Yes    Tatiana   2/3 Transferred to CVICU for [facility] placement    SUBJECTIVE:     As noted above    Review of Systems:     Review of Systems   Constitutional:  Positive for malaise/fatigue. Negative for chills and fever. HENT:  Negative for congestion and sinus pain. Eyes:  Negative for blurred vision, double vision and pain. Respiratory:  Positive for shortness of breath. Negative for hemoptysis and sputum production. Cardiovascular:  Negative for chest pain, palpitations and leg swelling. Gastrointestinal:  Negative for abdominal pain, nausea and vomiting. Genitourinary:  Negative for dysuria, frequency and urgency. Musculoskeletal:  Negative for back pain, joint pain and myalgias. Skin:  Negative for itching and rash. Neurological:  Negative for tremors, sensory change, speech change and focal weakness. Psychiatric/Behavioral:  Negative for hallucinations. The patient is not nervous/anxious. Past Medical History:      has a past medical history of CAD (coronary artery disease) (11/10/2016), Deafness (10/28/2012), DM (diabetes mellitus) ([facility] [address] Elevated cholesterol, Hypertension, and NSTEMI (non-ST elevated myocardial infarction) ([facility] [address] (11/10/2016). Past Surgical History:      has a past surgical history that includes hx appendectomy; pr unlisted procedure cardiac surgery (11/11/2016); colonoscopy (N/A, 6/28/2018); and colonoscopy (N/A, 1/18/2023). Home Medications:     Prior to Admission medications    Medication Sig Start Date End Date Taking? Authorizing Provider   polyethylene glycol (Miralax) 17 gram/dose powder Take 17 g by mouth daily as needed for Constipation.    Yes Provider, Historical   furosemide (LASIX) 20 mg tablet Take 1 Tablet by mouth daily as needed (for weight greater than 120 lb by 2-3 lb or shortness of breath). 1/23/23  Yes Ivonne Linton NP   milrinone (PRIMACOR) 20 mg/100 mL infusion 0.2 mcg/kg/min by IntraVENous route continuous. infuse at 0.2mcg/kg/min with dosage weight of 52kgs continuously   Yes Ana Rosa Ng MD   finasteride (PROSCAR) 5 mg tablet Take 1 Tablet by mouth daily (with dinner) for 30 days. 1/19/23 2/18/23 Yes John Ndiaye MD   pantoprazole (PROTONIX) 40 mg tablet Take 1 Tablet by mouth Daily (before breakfast) for 30 days. 1/20/23 2/19/23 Yes John Ndiaye MD   metFORMIN (GLUCOPHAGE) 500 mg tablet Take 1 Tablet by mouth two (2) times daily (with meals) for 30 days. 1/19/23 2/18/23 Yes John Ndiaye MD   glipiZIDE (GLUCOTROL) 5 mg tablet Take 1 tablet by mouth twice daily 12/2/22  Yes Tarun Kong MD   ipratropium (ATROVENT) 21 mcg (0.03 %) nasal spray 2 Sprays by Both Nostrils route every twelve (12) hours. 11/21/22  Yes Tarun Kong MD   ergocalciferol (ERGOCALCIFEROL) 1,250 mcg (50,000 unit) capsule Take 1 Capsule by mouth every seven (7) days. Patient taking differently: Take 50,000 Units by mouth every seven (7) days. Mondays 10/14/22  Yes Tarun Kong MD   Januvia 50 mg tablet Take 1 tablet by mouth once daily 9/23/22  Yes Tarun Kong MD   rosuvastatin (CRESTOR) 20 mg tablet Take 1 Tablet by mouth nightly. 2/28/22  Yes Ramila Ballard MD   aspirin delayed-release 81 mg tablet Take 81 mg by mouth daily. Yes Provider, Historical   zolpidem (AMBIEN) 5 mg tablet Take 1 Tablet by mouth nightly as needed for Sleep. Max Daily Amount: 5 mg. 1/24/23   Tarun Kong MD   nitroglycerin (NITROSTAT) 0.4 mg SL tablet 1 Tablet by SubLINGual route every five (5) minutes as needed for Chest Pain. Up to 3 doses.  11/16/22   Ana Rosa Ng MD       Allergies/Social/Family History:     No Known Allergies   Social History     Tobacco Use    Smoking status: Never Passive exposure: Never    Smokeless tobacco: Never   Substance Use Topics    Alcohol use: Yes     Alcohol/week: 2.0 standard drinks     Types: 1 Cans of beer, 1 Shots of liquor per week     Comment: rarely      Family History   Problem Relation Age of Onset    Heart Disease Father     Heart Attack Father     Hypertension Mother     Elevated Lipids Brother     Elevated Lipids Brother     No Known Problems Sister     Elevated Lipids Brother     No Known Problems Son     No Known Problems Daughter     Anesth Problems Neg Hx          OBJECTIVE:     Labs and Data: Reviewed 23  Medications: Reviewed 23  Imaging: Reviewed 23    General - chronically ill, sarcopenia, OOBTC  HEENT- pupils equal, EOMI, anicteric  Neuro - moves all extrem, follows basic commands, no focal deficit  CV - RRR, PAC+  Resp - rales b/l, NC  Abd - soft, nontender, no guarding  MSK- intact, no sacral ulcer      Visit Vitals  BP (!) 106/51   Pulse 94   Temp 97.5 °F (36.4 °C)   Resp 28   Ht 5' 9\" (1.753 m)   Wt 57.2 kg (126 lb 1.7 oz)   SpO2 95%   BMI 18.62 kg/m²    O2 Flow Rate (L/min): 10 l/min O2 Device: Hi flow nasal cannula (MId flow) Temp (24hrs), Av.1 °F (36.7 °C), Min:97.5 °F (36.4 °C), Max:98.7 °F (37.1 °C)    CVP (mmHg): 12 mmHg (23 0900)      Intake/Output:     Intake/Output Summary (Last 24 hours) at 2023 1003  Last data filed at 2023 0700  Gross per 24 hour   Intake 1667.1 ml   Output 2150 ml   Net -482.9 ml         Imaging    23    ECHO ADULT FOLLOW-UP OR LIMITED 2023    Interpretation Summary    Left Ventricle: EF by visual approximation is 20%. Left ventricle is mildly dilated. Mitral Valve: Moderately thickened leaflet, at the anterior and posterior leaflets. Moderately calcified leaflet. Moderate regurgitation. Left Atrium: Left atrium is moderately dilated. Technical qualifiers: Echo study was technically difficult with poor endocardial visualization.     Contrast used: Definity. Limited study, not all structures viewed    Signed by: Shawanda Benitez MD on 1/27/2023  4:39 PM       Pertinent imaging reviewed and interpreted as noted above    CRITICAL CARE DOCUMENTATION  I had a face to face encounter with the patient, reviewed and interpreted patient data including clinical events, labs, images, vital signs, I/O's, and examined patient. I have discussed the case and the plan and management of the patient's care with the consulting services, the bedside nurses and the respiratory therapist.      NOTE OF PERSONAL INVOLVEMENT IN CARE   This patient has a high probability of imminent, clinically significant deterioration, which requires the highest level of preparedness to intervene urgently. I participated in the decision-making and personally managed or directed the management of the following life and organ supporting interventions that required my frequent assessment to treat or prevent imminent deterioration. I personally spent 30 minutes of critical care time. This is time spent at this critically ill patient's bedside actively involved in patient care as well as the coordination of care. This does not include any procedural time which has been billed separately.     Sanaz Elizabeth DO  Staff Intensivist  [facility]  2/5/2023

## 2023-02-05 NOTE — PROGRESS NOTES
2000 Bedside shift change report given to [address] (oncoming nurse) by Gurpreet Enriquez RN (offgoing nurse). Report included the following information SBAR, Kardex, ED Summary, Procedure Summary, Intake/Output, MAR, Accordion, Recent Results, and Cardiac Rhythm NSR .    2150 HYPOGLYCEMIC EPISODE DOCUMENTATION    Patient with hypoglycemic episode(s) at 2150(time) on 2/4/2023(date). BG value(s) pre-treatment 61    Was patient symptomatic? [] yes, [x] no  Patient was treated with the following rescue medications/treatments: [] D50                [] Glucose tablets                [] Glucagon                [x] 4oz juice                [] 6oz reg soda                [] 8oz low fat milk  BG value post-treatment: 88  Once BG treated and value greater than 80mg/dl, pt was provided with the following:  [x] snack  [] meal    0000 CI 1.8- orders given for 25g of 25% IV albumin per intensivist.      0730 Bedside shift change report given to [address] [place] [address] Rd (oncoming nurse) by Ana Rosa Fowler RN (offgoing nurse). Report included the following information SBAR, Kardex, ED Summary, Procedure Summary, Intake/Output, MAR, Accordion, Recent Results, and Cardiac Rhythm NSR .   _____________________________________________  Problem: Diabetes Self-Management  Goal: *Disease process and treatment process  Description: Define diabetes and identify own type of diabetes; list 3 options for treating diabetes. Outcome: Progressing Towards Goal  Goal: *Incorporating nutritional management into lifestyle  Description: Describe effect of type, amount and timing of food on blood glucose; list 3 methods for planning meals. Outcome: Progressing Towards Goal  Goal: *Incorporating physical activity into lifestyle  Description: State effect of exercise on blood glucose levels.   Outcome: Progressing Towards Goal  Goal: *Developing strategies to promote health/change behavior  Description: Define the ABC's of diabetes; identify appropriate screenings, schedule and personal plan for screenings. Outcome: Progressing Towards Goal  Goal: *Using medications safely  Description: State effect of diabetes medications on diabetes; name diabetes medication taking, action and side effects. Outcome: Progressing Towards Goal  Goal: *Monitoring blood glucose, interpreting and using results  Description: Identify recommended blood glucose targets  and personal targets. Outcome: Progressing Towards Goal  Goal: *Prevention, detection, treatment of acute complications  Description: List symptoms of hyper- and hypoglycemia; describe how to treat low blood sugar and actions for lowering  high blood glucose level. Outcome: Progressing Towards Goal  Goal: *Prevention, detection and treatment of chronic complications  Description: Define the natural course of diabetes and describe the relationship of blood glucose levels to long term complications of diabetes. Outcome: Progressing Towards Goal  Goal: *Developing strategies to address psychosocial issues  Description: Describe feelings about living with diabetes; identify support needed and support network  Outcome: Progressing Towards Goal  Goal: *Insulin pump training  Outcome: Progressing Towards Goal  Goal: *Sick day guidelines  Outcome: Progressing Towards Goal  Goal: *Patient Specific Goal (EDIT GOAL, INSERT TEXT)  Outcome: Progressing Towards Goal     Problem: Patient Education: Go to Patient Education Activity  Goal: Patient/Family Education  Outcome: Progressing Towards Goal     Problem: Pressure Injury - Risk of  Goal: *Prevention of pressure injury  Description: Document Mike Scale and appropriate interventions in the flowsheet.   Outcome: Progressing Towards Goal  Note: Pressure Injury Interventions:  Sensory Interventions: Assess changes in LOC, Assess need for specialty bed, Avoid rigorous massage over bony prominences, Check visual cues for pain, Discuss PT/OT consult with provider, Float heels, Maintain/enhance activity level, Keep linens dry and wrinkle-free, Minimize linen layers, Monitor skin under medical devices    Moisture Interventions: Internal/External urinary devices    Activity Interventions: Increase time out of bed, Pressure redistribution bed/mattress(bed type), PT/OT evaluation    Mobility Interventions: Float heels, HOB 30 degrees or less, Pressure redistribution bed/mattress (bed type), PT/OT evaluation    Nutrition Interventions: Document food/fluid/supplement intake    Friction and Shear Interventions: Apply protective barrier, creams and emollients, Feet elevated on foot rest, HOB 30 degrees or less, Foam dressings/transparent film/skin sealants, Lift sheet, Minimize layers                Problem: Patient Education: Go to Patient Education Activity  Goal: Patient/Family Education  Outcome: Progressing Towards Goal     Problem: Falls - Risk of  Goal: *Absence of Falls  Description: Document Laverne Fall Risk and appropriate interventions in the flowsheet.   Outcome: Progressing Towards Goal  Note: Fall Risk Interventions:  Mobility Interventions: Communicate number of staff needed for ambulation/transfer, Mechanical lift, PT Consult for assist device competence, PT Consult for mobility concerns         Medication Interventions: Patient to call before getting OOB, Teach patient to arise slowly    Elimination Interventions: Call light in reach, Toilet paper/wipes in reach, Toileting schedule/hourly rounds    History of Falls Interventions: Assess for delayed presentation/identification of injury for 48 hrs (comment for end date), Room close to nurse's station, Investigate reason for fall         Problem: Patient Education: Go to Patient Education Activity  Goal: Patient/Family Education  Outcome: Progressing Towards Goal     Problem: Pain  Goal: *Control of Pain  Outcome: Progressing Towards Goal  Goal: *PALLIATIVE CARE:  Alleviation of Pain  Outcome: Progressing Towards Goal     Problem: Patient Education: Go to Patient Education Activity  Goal: Patient/Family Education  Outcome: Progressing Towards Goal     Problem: Patient Education: Go to Patient Education Activity  Goal: Patient/Family Education  Outcome: Progressing Towards Goal     Problem: Patient Education: Go to Patient Education Activity  Goal: Patient/Family Education  Outcome: Progressing Towards Goal

## 2023-02-05 NOTE — PROGRESS NOTES
Patient name: Jem Dillard  MRN: [id number]    Nephrology Progress note:    Assessment:  TANNER on CKD-3a: Suspect cardiorenal effects with needed diuresis. Hypotension/poor renal perfusion likely culprit. Recent baseline Cr has been in the low 1s. Hyperkalemia: K bump to 5.4-> 2 to oral KCL/evolving TANNER. Better s/p Lokelma dose x1     Ischemic CM: Recurrent exacerbations. EF 20%. On continuous Milrinone since last admission. 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 Not sob at rest. Tolerating po, but decreased appetite.      ROS:   no vomiting  No chest pain    Exam:  Visit Vitals  /63   Pulse 95   Temp 97.7 °F (36.5 °C)   Resp (!) 31   Ht 5' 9\" (1.753 m)   Wt 57.2 kg (126 lb 1.7 oz)   SpO2 99%   BMI 18.62 kg/m²     Wt Readings from Last 3 Encounters:   02/05/23 57.2 kg (126 lb 1.7 oz)   01/25/23 55.8 kg (123 lb)   01/23/23 54.9 kg (121 lb)       Intake/Output Summary (Last 24 hours) at 2/5/2023 0810  Last data filed at 2/5/2023 0700  Gross per 24 hour   Intake 2147.28 ml   Output 2300 ml   Net -152.72 ml         Gen: NAD/Frail  HEENT: AT/NC  Lungs/Chest wall: Clear. No accessory muscle use. Cardiovascular: Regular rate, normal rhythm. Abdomen/: Soft, NT, ND, BS+. +Vasquez  Ext: No peripheral edema  CNS: alert and awake.  Answers appropriately      Current Facility-Administered Medications   Medication Dose Route Frequency Last Admin    iron sucrose (VENOFER) 200 mg in 0.9% sodium chloride 100 mL IVPB  200 mg IntraVENous DAILY 200 mg at 02/04/23 0913    milrinone (PRIMACOR) 20 MG/100 ML D5W infusion  0.3 mcg/kg/min IntraVENous CONTINUOUS 0.3 mcg/kg/min at 02/05/23 0035    0.9% sodium chloride infusion 250 mL  250 mL IntraVENous PRN      insulin glargine (LANTUS) injection 10 Units  10 Units SubCUTAneous DAILY 10 Units at 02/04/23 0913    alteplase (CATHFLO) 1 mg in sterile water (preservative free) 1 mL injection  1 mg InterCATHeter PRN      bacitracin 500 unit/gram packet 1 Packet  1 Packet Topical PRN      bumetanide (BUMEX) injection 1 mg  1 mg IntraVENous Q4H PRN 1 mg at 02/04/23 1257    epoetin heidy-epbx (RETACRIT) injection 10,000 Units  10,000 Units SubCUTAneous Q7D 10,000 Units at 02/02/23 2213    senna-docusate (PERICOLACE) 8.6-50 mg per tablet 1 Tablet  1 Tablet Oral DAILY PRN      polyethylene glycol (MIRALAX) packet 17 g  17 g Oral DAILY PRN      insulin lispro (HUMALOG) injection 6 Units  6 Units SubCUTAneous TID WITH MEALS 6 Units at 02/04/23 1626    traZODone (DESYREL) tablet 50 mg  50 mg Oral QHS PRN 50 mg at 02/04/23 2146    apixaban (ELIQUIS) tablet 10 mg  10 mg Oral BID 10 mg at 02/04/23 1810    Followed by    Jaylene Magana ON 2/6/2023] apixaban (ELIQUIS) tablet 5 mg  5 mg Oral BID      glucose chewable tablet 16 g  4 Tablet Oral PRN      glucagon (GLUCAGEN) injection 1 mg  1 mg IntraMUSCular PRN      dextrose 10 % infusion 0-250 mL  0-250 mL IntraVENous PRN      insulin lispro (HUMALOG) injection   SubCUTAneous AC&HS 2 Units at 02/05/23 0706    balsam peru-castor oiL (VENELEX) ointment   Topical BID Given at 02/04/23 1814    [Held by provider] metoprolol succinate (TOPROL-XL) XL tablet 12.5 mg  12.5 mg Oral Q12H 12.5 mg at 02/02/23 2200    lidocaine 4 % patch 1 Patch  1 Patch TransDERmal Q24H 1 Patch at 02/04/23 1146    amiodarone (CORDARONE) tablet 400 mg  400 mg Oral  mg at 02/04/23 1810    aspirin delayed-release tablet 81 mg  81 mg Oral DAILY 81 mg at 02/04/23 0913    sodium chloride (NS) flush 5-40 mL  5-40 mL IntraVENous Q8H 10 mL at 02/05/23 0707    sodium chloride (NS) flush 5-40 mL  5-40 mL IntraVENous PRN      acetaminophen (TYLENOL) tablet 650 mg  650 mg Oral Q6H  mg at 02/02/23 2212    Or    acetaminophen (TYLENOL) suppository 650 mg  650 mg Rectal Q6H PRN      polyethylene glycol (MIRALAX) packet 17 g  17 g Oral DAILY PRN 17 g at 02/02/23 1539    ondansetron (ZOFRAN ODT) tablet 4 mg  4 mg Oral Q8H PRN 4 mg at 01/30/23 1357    Or    ondansetron (ZOFRAN) injection 4 mg  4 mg IntraVENous Q6H PRN 4 mg at 02/03/23 1926    heparin (porcine) pf 500 Units  500 Units InterCATHeter PRN      pantoprazole (PROTONIX) tablet 40 mg  40 mg Oral ACB 40 mg at 02/05/23 0706    rosuvastatin (CRESTOR) tablet 20 mg  20 mg Oral QHS 20 mg at 02/04/23 2146    heparin (porcine) pf 500 Units  500 Units InterCATHeter DAILY 500 Units at 02/04/23 0913       Labs/Data:    Lab Results   Component Value Date/Time    WBC 5.3 02/05/2023 04:13 AM    HGB 8.5 (L) 02/05/2023 04:13 AM    HCT 29.5 (L) 02/05/2023 04:13 AM    PLATELET 500 43/84/9017 04:13 AM    MCV 85.3 02/05/2023 04:13 AM       Lab Results   Component Value Date/Time    Sodium 134 (L) 02/05/2023 04:13 AM    Potassium 4.5 02/05/2023 04:13 AM    Chloride 106 02/05/2023 04:13 AM    CO2 23 02/05/2023 04:13 AM    Anion gap 5 02/05/2023 04:13 AM    Glucose 267 (H) 02/05/2023 04:13 AM    BUN 39 (H) 02/05/2023 04:13 AM    Creatinine 1.95 (H) 02/05/2023 04:13 AM    BUN/Creatinine ratio 20 02/05/2023 04:13 AM    GFR est AA 46 (L) 06/23/2022 09:40 AM    GFR est non-AA 38 (L) 06/23/2022 09:40 AM    eGFR 36 (L) 02/05/2023 04:13 AM    eGFR 69 01/25/2023 11:55 AM    Calcium 8.9 02/05/2023 04:13 AM       Patient seen and examined. Chart reviewed. Labs, data and other pertinent notes reviewed in last 24 hrs.     Discussed with patient and AHFT    Signed by:  Li Hidalgo MD  [address] [employer]

## 2023-02-05 NOTE — PROGRESS NOTES
0800 Bedside and Verbal shift change report given to Malissa Cook RN (oncoming nurse) by Elton Hall RN (offgoing nurse). Report included the following information SBAR, Kardex, OR Summary, Procedure Summary, Intake/Output, MAR, Recent Results, and Cardiac Rhythm NSR .     1230 CI 1.8 noted. Discussed w/ Dr Nano Burrows, Plan to monitor for now. Uneventful shift. 2000 Bedside and Verbal shift change report given to Elton Hall RN (oncoming nurse) by Malissa Cook RN (offgoing nurse). Report included the following information SBAR, Kardex, ED Summary, Procedure Summary, Intake/Output, MAR, Recent Results, and Cardiac Rhythm NSR .

## 2023-02-05 NOTE — PROGRESS NOTES
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 During LVAD eval, found to have well-differentiated neuroendocrine tumor on gastric body and gastric polyp, G1; with GI, Oncology and Palliative following. Per Oncology- this may be a very treatable tumor with good prognosis and thus would not preclude LVAD. No absolute contraindications to LVAD at this point, now with good prognosis from Oncology (see their note for details); continuing with LVAD eval, will be presented at [facility] this afternoon for decision on LVAD.      Consider additional inotropic agent vs impella to monitor renal fxn and optimize pre-op        RECOMMENDATIONS:  Continued hemodynamic monitoring  Continue milrinone 0.3mcg/kg/min   May need addition of dobutamine to optimize renal function- hold off for now   Hold toprol due to hypotension, concern for poor renal perfusion contributing to TANNER on CKD  Cannot tolerate ARB/ARNi due to hypotension  Cannot tolerate spironolactone due to hyperkalemia  Cannot tolerate jardiance due to significant diuresis on smallest dose/contributed to IVVD  Discontinued corlanor due to SVT  Digoxin stopped d/t risk for toxicity with amio loading; monitoring level  Continue amiodarone, appreciate EP cardiology recs  Hold lasix and potassium; montior renal function  Appreciate nephrology recommendations   Start Mag ox 400 mg daily   Keep K+ >4 and Mg >2  S/p Venofer 200mg x 2 doses  Transfuse to keep hgb closer to 8 for potential surgical procedure  Abx/treatment of suspected PNA per hospitalist  Continue lifevest  Continue baby aspirin   Plavix previously stopped, okayed by Dr. Payton Chávez consult pending (high risk for TRISTAN)  Heel pressure ulcer- wound care consulted  VAD evaluation completed  Accepted for VAD implant if renal function stabilizes      All other care per primary team      INTERVAL HISTORY:  Hemodynamics stable   Milrinone remains at 0.3mcg  Hgb improved to 8.5  Cr slightly improved to 1.9  Net neg even  Pro BNP down slightly today        IMPRESSION:  Acute on chronic combined systolic/diastolic  heart failure  Stage D, NYHA class IIIB/IV symptoms   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, LVEF 25-30% and 23% (by cMRI 1/3/23)  Cardiac MRI suggestive of ischemic cardiomyopathy  PYP equivocal  Chronic milrinone infusion as palliation   Coronary artery disease  CAD s/p CABG x 2: further disease best managed medically due to small vessel size   At risk of sudden cardiac death  Peripheral arterial disease  Bilateral hydronephrosis s/p donnelly  Cardiac risk factors:  HTN  HL  TRISTAN, STOP-BANG 4  DM2  CKD, stage 3  MOCA from 1/4/22 17/30, consistent with mild cognitive impairment  Hard of hearing  Gastric Neuroendocrine Tumor  Sepsis, unclear source         LIFE GOALS:  Lifestyle goals reviewed with the patient.   Patient's personal goals include: having a few more years with family  Important upcoming milestones or family events: None at this time   The patient identifies the following as important for living well: being at home, not being SOB              CARDIAC IMAGING:  Echo 1/27/23    Left Ventricle: EF by visual approximation is 20%. Left ventricle is mildly dilated. Mitral Valve: Moderately thickened leaflet, at the anterior and posterior leaflets. Moderately calcified leaflet. Moderate regurgitation. Left Atrium: Left atrium is moderately dilated. Technical qualifiers: Echo study was technically difficult with poor endocardial visualization. Contrast used: Definity. Limited study, not all structures viewed    Echo 1/9/23   Left Ventricle: Severely reduced left ventricular systolic function with a visually estimated EF of 25 - 30%. Left ventricle size is normal. Mildly increased wall thickness. Echo 12/26/22    Left Ventricle: Severely reduced left ventricular systolic function with a visually estimated EF of 25 - 30%. Left ventricle size is normal. Normal wall thickness. There are regional wall motion abnormalities. Grade II diastolic dysfunction with increased LAP. Right Ventricle: Moderately reduced systolic function. TAPSE is abnormal. TAPSE is 1.1 cm. Aortic Valve: Mild stenosis of the aortic valve. AV peak gradient is 13 mmHg. AV peak velocity is 1.8 m/s. Mitral Valve: Not well visualized. Moderate annular calcification at the posterior leaflet of the mitral valve. Mild to moderate regurgitation. Tricuspid Valve: Mildly elevated RVSP. Left Atrium: Left atrium is moderately dilated. 12/8/22    Left Ventricle: Moderately reduced left ventricular systolic function with a visually estimated EF of 35 - 40%. Severe hypokinesis of the following segments: mid anteroseptal, apical anterior, apical septal, apical inferior and apical lateral. Severe hypokinesis of the apex. Mitral Valve: Severely thickened leaflet, at the anterior and posterior leaflets. Severely calcified leaflet, at the anterior and posterior leaflets.  Mild annular calcification of the mitral valve. Moderate regurgitation. Left Atrium: Left atrium is mildly dilated. Contrast used: Definity. limited study     EKG 12/22/22 ST, Biatria enlargement, marked ST abnormality     C 12/6/22  1. Normal LVEDP  2. Severe native multivessel coronary artery disease  3. Patent LIMA to LAD and vein graft to distal RCA  4. Recurrent ISR in OM1 stent with now 60 to 70% restenosis  5. Recoil of left main and circumflex stent with now recurrent 40 to 50% stenosis. 6.  Progression of ostial left main disease now to about 60% stenosis  7. Progression of disease in jailed first marginal branch now with diffuse 90% stenosis  8. High-grade stenosis in the mid to distal right potential femoral artery treated with 6 x 40 mm impact drug-coated balloon angioplasty to reduce the stenosis to less than 40%     NST        HEMODYNAMICS:  RHC 1/9/23  PA 20/9, RA 3, PCWP 8, CI 1.8     CPEST too ill   6MW 300 feet       HPI:  71 y.o. male who was admitted via ED for worsening SOB, poor appetite, orthopnea and fatigue. Pmhx of CAD s/p CABG, PAD, DM 2, recent admission for HFmrEF earlier this month. Serial TTEs show progressive decline in EF since 3/2021 with the most recent EF at 25-30%. Recent LHC shows diffuse CAD and no interventions indicated. Patient had [place] Canal recently and there is some thought to myocarditis or other etiology causing worsening HF. [facility] was consulted for further evaluation and management of HFrEF. REVIEW OF SYSTEMS:  Review of Systems   Constitutional:  Positive for malaise/fatigue. Negative for chills and fever. Respiratory:  Negative for cough and shortness of breath. Cardiovascular:  Negative for chest pain, palpitations, orthopnea and leg swelling. Gastrointestinal:  Negative for abdominal pain, heartburn, nausea and vomiting. Genitourinary:         Poor appetite   Musculoskeletal:  Negative for falls. Neurological:  Positive for weakness. Negative for dizziness. Psychiatric/Behavioral:  Negative for depression. The patient is not nervous/anxious. PHYSICAL EXAM:  Visit Vitals  /63 (BP 1 Location: Left upper arm, BP Patient Position: At rest)   Pulse 91   Temp 97.5 °F (36.4 °C)   Resp 27   Ht 5' 9\" (1.753 m)   Wt 126 lb 1.7 oz (57.2 kg)   SpO2 98%   BMI 18.62 kg/m²     Hemodynamics:   CO: CO (l/min): 3.9 l/min   CI: CI (l/min/m2): 2.3 l/min/m2   CVP: CVP (mmHg): 3 mmHg (02/05/23 0800)   SVR: SVR (dyne*sec)/cm5: 1415 (dyne*sec)/cm5 (02/05/23 2646)   PAP Systolic: PAP Systolic: 51 (39/10/08 3895)   PAP Diastolic: PAP Diastolic: 17 (24/18/23 1398)   PVR:     SV02: SVO2 (%): 67 % (02/05/23 0800)   SCV02:        Physical Exam  Vitals and nursing note reviewed. Constitutional:       General: He is not in acute distress. Appearance: Normal appearance. Cardiovascular:      Rate and Rhythm: Normal rate and regular rhythm. Pulses: Normal pulses. Heart sounds: Normal heart sounds. No murmur heard. No friction rub. No gallop. Pulmonary:      Effort: Pulmonary effort is normal. No respiratory distress. Breath sounds: Normal breath sounds. Abdominal:      General: Bowel sounds are normal. There is no distension. Palpations: Abdomen is soft. Tenderness: There is no abdominal tenderness. Musculoskeletal:      Right lower leg: No edema. Left lower leg: No edema. Skin:     General: Skin is warm and dry. Capillary Refill: Capillary refill takes less than 2 seconds. Neurological:      General: No focal deficit present. Mental Status: He is alert and oriented to person, place, and time. Psychiatric:         Mood and Affect: Mood normal.         Behavior: Behavior normal.         Thought Content:  Thought content normal.         Judgment: Judgment normal.            PAST MEDICAL HISTORY:  Past Medical History:   Diagnosis Date    CAD (coronary artery disease) 11/10/2016    NSTEMI & 2 stents Deafness 10/28/2012    DM (diabetes mellitus) ([facility] [address]     Elevated cholesterol     Hypertension     NSTEMI (non-ST elevated myocardial infarction) ([facility] [address] 11/10/2016       PAST SURGICAL HISTORY:  Past Surgical History:   Procedure Laterality Date    COLONOSCOPY N/A 6/28/2018    COLONOSCOPY performed by Reinaldo Cota MD at [address] [place] [address] ENDOSCOPY    COLONOSCOPY N/A 1/18/2023    COLONOSCOPY performed by Chris Robertson MD at [address] [place] [address] ENDOSCOPY    [address]  11/11/2016    2 stents       FAMILY HISTORY:  Family History   Problem Relation Age of Onset    Heart Disease Father     Heart Attack Father     Hypertension Mother     Elevated Lipids Brother     Elevated Lipids Brother     No Known Problems Sister     Elevated Lipids Brother     No Known Problems Son     No Known Problems Daughter     Anesth Problems Neg Hx        SOCIAL HISTORY:  Social History     Socioeconomic History    Marital status:    Tobacco Use    Smoking status: Never     Passive exposure: Never    Smokeless tobacco: Never   Vaping Use    Vaping Use: Never used   Substance and Sexual Activity    Alcohol use: Yes     Alcohol/week: 2.0 standard drinks     Types: 1 Cans of beer, 1 Shots of liquor per week     Comment: rarely    Drug use: No    Sexual activity: Yes     Social Determinants of Health     Financial Resource Strain: Medium Risk    Difficulty of Paying Living Expenses: Somewhat hard   Food Insecurity: Food Insecurity Present    Worried About Running Out of Food in the Last Year: Never true    Ran Out of Food in the Last Year: Often true       LABORATORY RESULTS:     Labs Latest Ref Rng & Units 2/5/2023 2/4/2023 2/3/2023 2/2/2023 2/1/2023 1/31/2023 1/30/2023   WBC 4.1 - 11.1 K/uL 5.3 5.3 5.6 6.8 6.3 7.6 8.0   RBC 4.10 - 5.70 M/uL 3.46(L) 2.93(L) 3.00(L) 3.32(L) 3.27(L) 3.66(L) 3.48(L)   Hemoglobin 12.1 - 17.0 g/dL 8.5(L) 7. 1(L) 7. 5(L) 8. 3(L) 8.0(L) 9.1(L) 8.5(L)   Hematocrit 36.6 - 50.3 % 29. 5(L) 25. 3(L) 25.8(L) 28. 7(L) 27. 7(L) 30. 6(L) 29. 5(L)   MCV 80.0 - 99.0 FL 85.3 86.3 86.0 86.4 84.7 83.6 84.8   Platelets 547 - 141 K/uL 217 257 259 304 271 307 297   Lymphocytes 12 - 49 % 15 - 17 20 21 20 23   Monocytes 5 - 13 % 10 - 9 10 10 9 10   Eosinophils 0 - 7 % 1 - 1 2 3 3 1   Basophils 0 - 1 % 1 - 1 1 1 1 1   Albumin 3.5 - 5.0 g/dL 3.4(L) 2. 9(L) 3.0(L) - - 3. 1(L) -   Calcium 8.5 - 10.1 MG/DL 8.9 8.6 9.1 9.1 9.0 9.1 9.0   Glucose 65 - 100 mg/dL 267(H) 140(H) 129(H) 142(H) 94 221(H) 261(H)   BUN 6 - 20 MG/DL 39(H) 36(H) 42(H) 40(H) 33(H) 34(H) 27(H)   Creatinine 0.70 - 1.30 MG/DL 1.95(H) 2.16(H) 2.12(H) 2.01(H) 2.08(H) 2.09(H) 1.88(H)   Sodium 136 - 145 mmol/L 134(L) 137 137 135(L) 136 131(L) 131(L)   Potassium 3.5 - 5.1 mmol/L 4.5 4.8 4.6 4.9 4.9 5.4(H) 4.8   TSH 0.36 - 3.74 uIU/mL - - - - - - -   PSA 0.01 - 4.0 ng/mL - - - - - - -   LDH 85 - 241 U/L - - - - - - -   Some recent data might be hidden     Lab Results   Component Value Date/Time    TSH 3.52 01/28/2023 05:26 AM    TSH 2.12 12/27/2022 02:36 PM    TSH 4.80 (H) 12/06/2022 03:53 AM    TSH 5.39 (H) 10/12/2022 09:10 AM    TSH 3.53 02/03/2022 11:47 AM    TSH 5.790 (H) 11/21/2019 04:45 PM    TSH 3.08 06/22/2018 01:53 PM    TSH 4.250 05/26/2015 09:43 AM       ALLERGY:  No Known Allergies     CURRENT MEDICATIONS:    Current Facility-Administered Medications:     magnesium oxide (MAG-OX) tablet 400 mg, 400 mg, Oral, DAILY, Shaila, Lizette B, NP    iron sucrose (VENOFER) 200 mg in 0.9% sodium chloride 100 mL IVPB, 200 mg, IntraVENous, DAILY, Shaila, Lizette B, NP, Last Rate: 440 mL/hr at 02/05/23 0841, 200 mg at 02/05/23 0841    milrinone (PRIMACOR) 20 MG/100 ML D5W infusion, 0.3 mcg/kg/min, IntraVENous, CONTINUOUS, Shaila, Lizette B, NP, Last Rate: 5.1 mL/hr at 02/05/23 0800, 0.3 mcg/kg/min at 02/05/23 0800    0.9% sodium chloride infusion 250 mL, 250 mL, IntraVENous, PRN, Shaila Lizette B, NP    insulin glargine (LANTUS) injection 10 Units, 10 Units, SubCUTAneous, DAILY, Jaden Maharaj CNS, 10 Units at 02/05/23 0839    alteplase (CATHFLO) 1 mg in sterile water (preservative free) 1 mL injection, 1 mg, InterCATHeter, PRN, Lizette Linton, NP    bacitracin 500 unit/gram packet 1 Packet, 1 Packet, Topical, PRN, Shaila, Lizette B, NP    bumetanide (BUMEX) injection 1 mg, 1 mg, IntraVENous, Q4H PRN, Lizette Linton NP, 1 mg at 02/04/23 1257    epoetin heidy-epbx (RETACRIT) injection 10,000 Units, 10,000 Units, SubCUTAneous, Q7D, Jadiel Galvan MD, 10,000 Units at 02/02/23 2213    senna-docusate (PERICOLACE) 8.6-50 mg per tablet 1 Tablet, 1 Tablet, Oral, DAILY PRN, Trisha STALEY NP    polyethylene glycol (MIRALAX) packet 17 g, 17 g, Oral, DAILY PRN, Trisha STALEY NP    insulin lispro (HUMALOG) injection 6 Units, 6 Units, SubCUTAneous, TID WITH MEALS, Cathy Sheldon, CNS, 6 Units at 02/05/23 0839    traZODone (DESYREL) tablet 50 mg, 50 mg, Oral, QHS PRN, Yuliya Concepcion MD, 50 mg at 02/04/23 2146    apixaban (ELIQUIS) tablet 10 mg, 10 mg, Oral, BID, 10 mg at 02/04/23 1810 **FOLLOWED BY** [START ON 2/6/2023] apixaban (ELIQUIS) tablet 5 mg, 5 mg, Oral, BID, Kathy Roman MD    glucose chewable tablet 16 g, 4 Tablet, Oral, PRN, Ita Harvey NP    glucagon (GLUCAGEN) injection 1 mg, 1 mg, IntraMUSCular, PRN, Ita Harvey NP    dextrose 10 % infusion 0-250 mL, 0-250 mL, IntraVENous, PRN, Ita Harvey NP    insulin lispro (HUMALOG) injection, , SubCUTAneous, AC&HS, Ita Harvey NP, 2 Units at 02/05/23 0706    balsam peru-castor oiL (VENELEX) ointment, , Topical, BID, Otilio Pearson MD, Given at 02/05/23 0841    [Held by provider] metoprolol succinate (TOPROL-XL) XL tablet 12.5 mg, 12.5 mg, Oral, Q12H, Rishabh Eddy MD, 12.5 mg at 02/02/23 2200    lidocaine 4 % patch 1 Patch, 1 Patch, TransDERmal, Q24H, Yuliya Concepcion MD, 1 Patch at 02/04/23 1146    amiodarone (CORDARONE) tablet 400 mg, 400 mg, Oral, BID, Jarrod Leon MD, 400 mg at 02/05/23 0839    aspirin delayed-release tablet 81 mg, 81 mg, Oral, DAILY, Heber Barajas MD, 81 mg at 02/05/23 0839    sodium chloride (NS) flush 5-40 mL, 5-40 mL, IntraVENous, Q8H, Heber Barajas MD, 10 mL at 02/05/23 0707    sodium chloride (NS) flush 5-40 mL, 5-40 mL, IntraVENous, PRN, Heber Barajas MD    acetaminophen (TYLENOL) tablet 650 mg, 650 mg, Oral, Q6H PRN, 650 mg at 02/02/23 2212 **OR** acetaminophen (TYLENOL) suppository 650 mg, 650 mg, Rectal, Q6H PRN, Heber Barajas MD    polyethylene glycol (MIRALAX) packet 17 g, 17 g, Oral, DAILY PRN, Heber Barajas MD, 17 g at 02/02/23 1539    ondansetron (ZOFRAN ODT) tablet 4 mg, 4 mg, Oral, Q8H PRN, 4 mg at 01/30/23 1357 **OR** ondansetron (ZOFRAN) injection 4 mg, 4 mg, IntraVENous, Q6H PRN, Heber Barajas MD, 4 mg at 02/03/23 1926    heparin (porcine) pf 500 Units, 500 Units, InterCATHeter, PRN, Heber Barajas MD    pantoprazole (PROTONIX) tablet 40 mg, 40 mg, Oral, ACB, Heber Barajas MD, 40 mg at 02/05/23 0706    rosuvastatin (CRESTOR) tablet 20 mg, 20 mg, Oral, QHS, Heber Barajas MD, 20 mg at 02/04/23 2146    heparin (porcine) pf 500 Units, 500 Units, InterCATHeter, DAILY, Heber Barajas MD, 500 Units at 02/05/23 0840    PATIENT CARE TEAM:  Patient Care Team:  Berny Cedeno MD as PCP - General (Family Medicine)  Berny Cedeno MD as PCP - [facility]  Jan Mckeon MD (Cardiovascular Disease Physician)  Porsha Lebron MD (Gastroenterology)  Jewel Woody MD (Cardiothoracic Surgery)  Nathen Thomas MD (Cardiovascular Disease Physician)  Josi Berry MD (Nephrology)  Kike Rodriguez RN as Care Transitions Nurse  Jesse Kohler MD (Pulmonary Disease)  Kamila Wilson MD ([address] Vascular Surgery)     Thank you for allowing me to participate in this patient's care.     Kathy Lewis NP   [facility] [address]  Phone: [phone]    Total Critical Care time spent:    50 minutes. There was no overlap with other services        Critical care was necessary to treat or prevent imminent or life threatening deterioration of the following conditions: cardiac failure, respiratory failure and CNS failure or compromise     Services Provided:  1. Telemetry review and 12 lead ECG interpretation  2. Hemodynamic interpretation, assessment, and management  3. Review and interpretation of CXR  4. Review and interpretation of lab values  5. Review and interpretation of microbiologic data and culture results  6. Review of medications and administration  7. Review and interpretation of nutrition requirements and management  8. Discussion of management withother consultants and services  9.  Clinical update to family members

## 2023-02-06 NOTE — PROGRESS NOTES
Problem: Mobility Impaired (Adult and Pediatric)  Goal: *Acute Goals and Plan of Care (Insert Text)  Description: FUNCTIONAL STATUS PRIOR TO ADMISSION: Patient was modified independent using a rollator for functional mobility. Pt recently d/c home after previous hospital stay. Able to ambulate community distances. HOME SUPPORT PRIOR TO ADMISSION: The patient lived with spouse but did not require assist.    Physical Therapy Goals  Initiated 1/28/2023-- Goals appropriate and add #6 2/6/2023  1. Patient will move from supine to sit and sit to supine  in bed with modified independence within 7 day(s). 2.  Patient will transfer from bed to chair and chair to bed with modified independence using the least restrictive device within 7 day(s). 3.  Patient will perform sit to stand with modified independence within 7 day(s). 4.  Patient will ambulate with modified independence for 300 feet with the least restrictive device within 7 day(s). 5.  Patient will ascend/descend 12 stairs with 1 handrail(s) with supervision/set-up within 7 day(s). 6.  Patient will verbally and functionally recall move in the tube techniques in prep for possible LVAD within 7 days. Outcome: Progressing Towards Goal   PHYSICAL THERAPY TREATMENT: WEEKLY REASSESSMENT  Patient: Leandra Carver (76 y.o. male)  Date: 2/6/2023  Primary Diagnosis: CHF (congestive heart failure) ([facility] [address] [I50.9]       Precautions:   Fall      ASSESSMENT  Patient continues with skilled PT services and is progressing towards goals. Patient received sitting in chair, agreeable to therapy. Educated on Anaid in the tube\" as a part of post op protocol for LVAD (note he his currently being worked up). Participated in ambulation with rollator and 4L/min, swan in place, and RN present to monitor swan waveform. No SOB or instability noted throughout and he reported feeling much better than last session with PT prior to transfer to CVICU. BP stable with mobility.  Returned to bed per his request at end of session. Recommend HHPT at this time pending medical plan of care. Patient's progression toward goals since last assessment: progressing towards all goals, was limited by dizziness in previous sessions    Current Level of Function Impacting Discharge (mobility/balance): CGA    Other factors to consider for discharge: possible LVAD          PLAN :  Goals have been updated based on progression since last assessment. Patient continues to benefit from skilled intervention to address the above impairments. Recommendations and Planned Interventions: bed mobility training, transfer training, gait training, therapeutic exercises, neuromuscular re-education, patient and family training/education, and therapeutic activities      Frequency/Duration: Patient will be followed by physical therapy:  5 times a week to address goals. Recommendation for discharge: (in order for the patient to meet his/her long term goals)  Physical therapy at least 2 days/week in the home     This discharge recommendation:  Has been made in collaboration with the attending provider and/or case management    IF patient discharges home will need the following DME: none         SUBJECTIVE:   Patient stated Yes definitely.  re: PT to follow up tomorrow     OBJECTIVE DATA SUMMARY:   HISTORY:    Past Medical History:   Diagnosis Date    CAD (coronary artery disease) 11/10/2016    NSTEMI & 2 stents    Deafness 10/28/2012    DM (diabetes mellitus) ([facility] [address]     Elevated cholesterol     Hypertension     NSTEMI (non-ST elevated myocardial infarction) ([facility] [address] 11/10/2016     Past Surgical History:   Procedure Laterality Date    COLONOSCOPY N/A 6/28/2018    COLONOSCOPY performed by Kaela Burton MD at [facility] N/A 1/18/2023    COLONOSCOPY performed by Audrey Rodríguze MD at [facility]  11/11/2016    2 stents       Personal factors and/or comorbidities impacting plan of care: [facility]    Home Situation  Home Environment: Private residence  # Steps to Enter: 12  Rails to Enter: Yes  Hand Rails : Left  Wheelchair Ramp: No  One/Two Story Residence: Two story  # of Interior Steps: 12  Interior Rails: Left  Living Alone: No  Support Systems: Spouse/Significant Other  Patient Expects to be Discharged to[de-identified] Home with home health  Current DME Used/Available at Home: Kajal Schillings, rollator  Tub or Shower Type: Shower    EXAMINATION/PRESENTATION/DECISION MAKING:   Critical Behavior:  Neurologic State: Alert  Orientation Level: Oriented X4  Cognition: Appropriate decision making, Appropriate safety awareness, Appropriate for age attention/concentration, Follows commands  Safety/Judgement: Awareness of environment, Insight into deficits  Hearing: Auditory  Auditory Impairment: Hard of hearing, bilateral, Hearing aid(s)  Hearing Aids/Status: Left  Range Of Motion:  AROM: Within functional limits  Strength:    Strength: Within functional limits  Tone & Sensation:   Tone: Normal  Sensation: Impaired (neuropathy feet)  Coordination:  Coordination: Within functional limits  Functional Mobility:  Bed Mobility:  Sit to Supine: Supervision  Transfers:  Sit to Stand: Contact guard assistance  Stand to Sit: Contact guard assistance  Balance:   Sitting: Intact; With support  Standing: Impaired; With support  Standing - Static: Good  Standing - Dynamic : Fair;Good  Ambulation/Gait Training:  Distance (ft): 300 Feet (ft)  Assistive Device: Gait belt;Walker, rollator (4L/min)  Ambulation - Level of Assistance: Contact guard assistance  Gait Abnormalities: Decreased step clearance  Speed/Bette: Pace decreased (<100 feet/min)  Step Length: Left shortened;Right shortened    Activity Tolerance:   Good and requires frequent rest breaks    After treatment patient left in no apparent distress:   Sitting in chair and Call bell within reach    COMMUNICATION/EDUCATION:   The patients plan of care was discussed with: Occupational therapist and Registered nurse. Fall prevention education was provided and the patient/caregiver indicated understanding., Patient/family have participated as able in goal setting and plan of care. , and Patient/family agree to work toward stated goals and plan of care.     Thank you for this referral.  Jarret Concepcion, PT, DPT   Time Calculation: 29 mins

## 2023-02-06 NOTE — PROGRESS NOTES
0730: Bedside and Verbal shift change report given to [address] (oncoming nurse) by Yoan Gao RN (offgoing nurse). Report included the following information SBAR, Kardex, Intake/Output, Recent Results, Med Rec Status, and Cardiac Rhythm NSR . Patient received on 6L NC.     0950: MD Ana Benitez and NP Colleen at bedside, review with RN plan for day to include CXR, EKG, increase milrinone dose, plan to wean O2 as tolerated, and add a PT/OT consult. 1215: Patient called out c/o SOB, patient repositioned, O2 increased to 6L NC.     1350:  Patient c/o coughing and SOB. O2 sats appropriate %, but CVP increasing 13-14. Pt clear with some wheezing on auscultation. MD Julius Montelongo notified- plan to give PRN bumex dose, add breathing treatment PRN. O2 increased to 8L NC.    1530: Patient OOB to commode, on transfer back to bed, patient with increased WOB, dyspnea. RN raised HOB, increased NC to 10L O2. SpO2 >92%. RT called for PRN breathing treatment. 1545: Informed MD Ana Benitez of changes in patient condition- orders received to start patient on dobutamine, see MAR for details. 1930: Bedside and Verbal shift change report given to [facility] (oncoming nurse) by Uriel Wayne RN (offgoing nurse). Report included the following information SBAR, Kardex, Intake/Output, Recent Results, Med Rec Status, Cardiac Rhythm NSR, and Alarm Parameters .

## 2023-02-06 NOTE — DIABETES MGMT
[facility]  DIABETES MANAGEMENT CONSULT    Consulted by  Kristie Cote MD   for advanced nursing evaluation and care for inpatient blood glucose management. Evaluation and Action Plan   Eloisa August is a 71year old gentleman, with Type 2 Diabetes with a recent A1C of 7.7%, who is admitted with arrhythmias and acute on chronic HFrEF with failure to respond to inotrope support. He was discharged one week prior from a prolonged hospital stay for acute on chronic HF and LVAD work-up and discharged home on dobutamine with a lifevest.  Glucose control was tenuous during his previous admission s/t multiple co-morbidities, several tests/procedures requiring NPO status, waxing and waining oral intake. At this time glucose is impacted by:  Heart Failure with reduced EF 25-30%  Milrinone therapy  TANNER: GFR 35  Infection: UTI and PNA, IV antibiotics   Oral intake     Last admission, he required low basal doses but high bolus doses with meals to offset pre-prandial hyperglycemia. He is experiencing pre-prandial hyperglycemia now despite moderate dose bolus humalog resumed- and dose increased to high dose. Individualized BG target is closer to 180mg/dl. Management Rationale Action Plan   Medication   Basal needs Using 0.2 units/kg/D Lantus 10 units daily as fasting BG at goal   Nutritional needs Using resistant sensitivity based on degree of pre-prandial hyperglycemia Continue 6 units Humalog/meal (high dose). Increased 2/2     Hold if patient is NPO or consumes less than 50% of carbohydrates on meal tray    Advance by 2 units daily for persistent   pre-prandial hyperglycemia   Corrective insulin Using normal sensitivity  Normal sensitivity ACHS     Referral [x] Inpatient nutrition services   Additional orders  Carb consistent diet. Additional recommendations per RD: they were very helpful last admission with malnutrition, poor appetite, and hyperglycemia. Initial Presentation   Anna Webber is a 71 y.o. male who presented to the ED 1/26/23 with lower extremity swelling and difficulty breathing. He had a prolonged hospital admission from 12/22/22-1/19/23 for acute on chronic systolic HF and LVAD work-up. He was discharged with home inotrope. LAB: , GFR 58, Trop 2003, BNP 4524  CXR: New diffuse bilateral increased interstitial markings, partially obscuring bilateral pulmonary nodules. HX:   Past Medical History:   Diagnosis Date    CAD (coronary artery disease) 11/10/2016    NSTEMI & 2 stents    Deafness 10/28/2012    DM (diabetes mellitus) ([facility] [address]     Elevated cholesterol     Hypertension     [facility] 11/10/2016        INITIAL DX:   CHF [facility]congestive [facility] failure) [facility] [I50.9]     Current Treatment     TX: Milrinone, Amio. Specialty consultations: [facility], Cardiology, EP, GI     Hospital Course   Clinical progress has been complicated by:    8/82: Admission. Rapid response for SVT- Given adenosine x2, amio bolus/gtt  1/27: Advanced HFC consult. EP consult ordered. Intolerance of inotropic support. Cardiology consult. NSTEMI, heparin gtt started. Seen by EP: Continue amio. 1/28: Febrile: CT chest 1/28 shows diffuse focal opacities concerning for pneumonia. Obtain blood cultures, UA. Pathology report 1/18/23: well differentiated neuroendocrine tumor grade 1. EGD: Patchy erythema gastric body, biopsies done. 6 mm sessile polyp gastric body biopsied  1/30: Oncology consult: Recommend multiphase CT of the abdomen and pelvis to evaluate for metastatic spread. Tachycardia and SOB, BiPAP overnight. 2/3: Accepted for VAD implant if renal fx improves per [facility].   CCU Transfer and swan placed  2/4: PRBC transfusion   Diabetes History   Type 2 Diabetes  Ambulatory BG management provided by: PCP Tigist Quintanilla MD    Diabetes-related Medical History  Acute complications  Acute hyperglycemia  Neurological complications  Peripheral neuropathy  Microvascular disease  Nephropathy  Macrovascular disease  CAD  Other associated conditions                                       CHF     Diabetes Medication History  Key Antihyperglycemic Medications        Diabetes Medication History  Key Antihyperglycemic Medications               metFORMIN (GLUCOPHAGE) 500 mg tablet (Taking) Take 1 Tablet by mouth two (2) times daily (with meals) for 30 days. glipiZIDE (GLUCOTROL) 5 mg tablet (Taking) Take 1 tablet by mouth twice daily    Januvia 50 mg tablet (Taking) Take 1 tablet by mouth once daily             Diabetes self-management practices:   Eating pattern                Eats 3 small meals daily  [x]         Breakfast                         2 Eggs, Coffee  [x]         Lunch                               Cabery  [x]         Dinner                              \"[place]ian Food\" Bread, rice, lentils   [x]         Bedtime                           [place]  [x]         Snacks                              Afternoon snack  [x]         Beverages                        Water, Coffee  Physical activity pattern                Sedentary   Monitoring pattern  Discharged last week with a Carolynn CGM and serum glucometer  7 day average Ba-9a: 129-164  9a-12: 243  Noon to 9p: 198-238  1 low BG in the last week overnight    Taking medications pattern  [x]         Consistent administration  [x]         Affordable  Social determinants of health impacting diabetes self-management practices   Concerned that you need to know more about how to stay healthy with diabetes  Overall evaluation:                 [x]         Achieving A1c target with drug therapy & self-care practices    Subjective   \"I had a good weekend. \"     Objective   Physical exam  General Underweight [place] ([facility]) male in acute distress/ill-appearing.     Neuro  Alert, oriented   Vital Signs Visit Vitals  BP (!) 108/55 (BP 1 Location: Left upper arm, BP Patient Position: At rest)   Pulse 84 Temp 97.9 °F (36.6 °C)   Resp (!) 34   Ht 5' 9\" (1.753 m)   Wt 57.9 kg (127 lb 10.3 oz)   SpO2 100%   BMI 18.85 kg/m²     Skin  Warm and dry. No acanthosis noted along neckline. Heart   Regular rate and rhythm. No murmurs, rubs or gallops  Lungs  Clear to auscultation without rales or rhonchi  Extremities No foot wounds        Laboratory  Recent Labs     23  0327 23  0413 23  0442   GLU 93 267* 140*   AGAP 5 5 7   WBC 6.0 5.3 5.3   CREA 1.68* 1.95* 2.16*   AST 12* 11* 11*   ALT 14 14 14         Factors impacting BG management  Factor Dose Comments   Nutrition:  Standard meals     60 grams/meal      Heart Failure/NSTEMI/Ischemic cardiomyopathy/Arrhythmias  Milrinone  BNP 4879  EF 25-30%    Infection UTI/PNA  Urine Cx pending   IV antibiotics   FEvers    Other:   Kidney function TANNER on CKD:   Current GFR 35      Blood glucose pattern    Significant diabetes-related events over the past 24-72 hours  UA on admission: 100 glucose, yeast, moderate leuk esterase  A1C 7.7% 23  Fasting B/190  Pre-prandial: 59 (2/4) at bed.   But 110-245 in the last 24h  Basal: Lantus 10 units  Bolus: 6 units Humalog/meal  Correction: 5 units in the last 24h  Eating ok- drinking supplements/milk       Assessment and Nursing Intervention   Nursing Diagnosis Risk for unstable blood glucose pattern   Nursing Intervention Domain 5250 Decision-making Support   Nursing Interventions Examined current inpatient diabetes/blood glucose control   Explored factors facilitating and impeding inpatient management  Explored corrective strategies with patient and responsible inpatient provider   Informed patient of rational for insulin strategy while hospitalized     Nursing Diagnosis 80888 Ineffective Health Management   Nursing Intervention Domain 5250 Decision-making Support   Nursing Interventions Identified diabetes self-management practices impeding diabetes control  Discussed diabetes survival skills related to  Healthy Plate eating plan; given handouts  Role of physical activity in improving insulin sensitivity and action  Procedure for blood glucose monitoring & options for low-cost products  Medications plan at discharge     Billing Code(s)   No charge    Before making these care recommendations, I personally reviewed the hospitalization record, including notes, laboratory & diagnostic data and current medications, and examined the patient at the bedside (circumstances permitting) before determining care. More than fifty (50) percent of the time was spent in patient counseling and/or care coordination.   Total minutes: 10    SLICK Mejia  Diabetes Clinical Nurse Specialist  [facility]  Access via [employer]

## 2023-02-06 NOTE — PROGRESS NOTES
Problem: Self Care Deficits Care Plan (Adult)  Goal: *Acute Goals and Plan of Care (Insert Text)  Description:   FUNCTIONAL STATUS PRIOR TO ADMISSION: Patient required minimal assistance for basic and instrumental ADLs. Used rollator for functional mobility, had HHA and HH OT/PT upon discharge. HOME SUPPORT: The patient lived with wife and family members. Occupational Therapy Goals  Initiated 1/30/2023  1. Patient will perform grooming with supervision/set-up within 7 day(s). 2.  Patient will perform upper body dressing with supervision/set-up within 7 day(s). 3.  Patient will perform lower body dressing with supervision/set-up within 7 day(s). 4.  Patient will perform all aspects of toileting with supervision/set-up within 7 day(s). 5.  Patient will utilize energy conservation techniques during functional activities with verbal cues within 7 day(s). Outcome: Progressing Towards Goal   OCCUPATIONAL THERAPY TREATMENT  Patient: Lorenzo Foreman (76 y.o. male)  Date: 2/6/2023  Diagnosis: CHF (congestive heart failure) ([email or web address] [I50.9] <principal problem not specified>      Precautions: Fall  Chart, occupational therapy assessment, plan of care, and goals were reviewed. ASSESSMENT  Patient continues with skilled OT services and is progressing towards goals. Patient received OOB, amenable to session. Patient continues to be LVAD candidate workup, educated on potential sternal precautions and modifying ADL/environment to maintain precautions if patient does receive LVAD. VSS throughout session, average MAP ~85, completed mobility with PT. Educated on energy conservation techniques to maximize ADL independence and safety. Seated completed LE dressing with improved tailor sitting ability. After mobility, returned to bedlevel, left with all needs in reach, NAD. Will continue to assess discharge needs, at this time anticipate [facility] and family support.       Current Level of Function Impacting Discharge (ADLs): x1 assist ADL, 2 assist for ICU line management    Other factors to consider for discharge: below baseline, LVAD workup         PLAN :  Patient continues to benefit from skilled intervention to address the above impairments. Continue treatment per established plan of care to address goals. Recommend with staff: OOB 3x daily for meals, functional mobility to bathroom    Recommend next OT session: OOB ADL    Recommendation for discharge: (in order for the patient to meet his/her long term goals)  To be determined: pending medical intervention and LVAD workup    This discharge recommendation:  Has been made in collaboration with the attending provider and/or case management    IF patient discharges home will need the following DME: TBD pending progress       SUBJECTIVE:   Patient stated I'm feeling much better.     OBJECTIVE DATA SUMMARY:   Cognitive/Behavioral Status:  Neurologic State: Alert  Orientation Level: Oriented X4  Cognition: Appropriate decision making  Perception: Appears intact  Perseveration: No perseveration noted  Safety/Judgement: Awareness of environment    Functional Mobility and Transfers for ADLs:  Bed Mobility:  Sit to Supine: Supervision    Transfers:  Sit to Stand: Contact guard assistance          Balance:  Sitting: Intact; With support  Standing: Impaired; With support  Standing - Static: Good  Standing - Dynamic : Fair;Good    ADL Intervention:       Grooming  Grooming Assistance: Supervision  Position Performed: Seated in chair  Washing Hands: Set-up         Type of Bath: Chlorhexidine (CHG)              Lower Body Dressing Assistance  Socks: Contact guard assistance  Leg Crossed Method Used: Yes  Position Performed: Seated in chair         Cognitive Retraining  Safety/Judgement: Awareness of environment    Therapeutic Exercises:   BUE ROM and gentle stretching    Pain:  None noted    Activity Tolerance:   Good and requires rest breaks    After treatment patient left in no apparent distress:   Supine in bed, Heels elevated for pressure relief, Call bell within reach, and Side rails x 3    COMMUNICATION/COLLABORATION:   The patients plan of care was discussed with: Physical therapist and Registered nurse.      Carmine Moreno OT  Time Calculation: 29 mins

## 2023-02-06 NOTE — PROGRESS NOTES
ADVANCED HEART FAILURE NOTE    Called re: patient remains dyspneic. SBP 90-110s/50s, HR 80-90s  O2 requirement increased to 10 liters. Cardiac index remains marginal 1.9-2.1. Plan:  Start dobutamine 2 mcg/kg/min  2.   Start bumex 1mg PO twice daily; keep PRN doses    Tiesha Quintanilla MD  F Cardiology

## 2023-02-06 NOTE — CONSULTS
Comprehensive Nutrition Assessment    Type and Reason for Visit: Reassess, Consult    Nutrition Recommendations/Plan:   Continue with Regular diet to promote PO intakes. Preferences updated. Encouraged/ discussed with wife that if she is bringing in outside food, it should be prepared at home without salt. Trial Glucerna and Nepro ONS. Continue Ensure High Protein once/day for now as well. Malnutrition Assessment:  Malnutrition Status: Moderate malnutrition (01/30/23 1232)    Context:  Acute illness     Findings of the 6 clinical characteristics of malnutrition:   Energy Intake:  75% or less of est energy req for 7 or more days  Weight Loss:  5% over one month     Body Fat Loss:  Mild body fat loss, Buccal region   Muscle Mass Loss:  Mild muscle mass loss, Clavicles (pectoralis & deltoids), Thigh (quadriceps)  Fluid Accumulation:  Mild, Extremities   Strength:  Not performed        Nutrition Assessment:    PMHx: CAD s/p PCI x 2, HFrEF with EF 25-30%, DM, HTN, hypercholesterolemia, Nstemi, CKD stage III. Admitted 1/26 d/t ongoing symptoms r/t his HFrEF. Treated for possible CAP and diuresed. Nephrology following for TANNER on CKD 3. Transferred to CVICU for [facility] placement and ongoing LVAD workup. 2/6: follow-up and new consult for poor appetite/ intake. Noted weight up from admission (122 lb ---> now 127 lb), both standing scale. Difficult to assess true wt gain vs fluid. UBW prior to December admissions was 135 lb, pt states he has never been higher than 137 lb. Looking back at pt's last 48 wt records in our system (which takes us to 2018) pt's highest consistent wt was 141 lb, but overall wt hx is fairly consistent with report. Wife present. Has been bringing food from outside. She states it is mostly food she prepares and doesn't add salt. However, she brought him Cook[employer] today, so staff concerned with salt content.   Wife aware he should be on a low Na+ diet, but states he was told he had no restrictions before since his intake was so poor and he was losing weight. Discussed current low K+ restriction and she states his kidney fx has not been good. She said she tried to get him a grilled cheese earlier this admission and was told he couldn't have this. She asked if she should stop bringing food from outside, perhaps he would be hungrier for more of the hospital food. We all discussed this as an option, but given his poor nutritional status overall and fact he is picky eater, this could result in him not eating meal at all. I encouraged more homemade food. Pt likes the Ensure - only vanilla and requesting strawberry, which we do not have in a lower CHO option. He was also open to trying Nepro. For now, will remove K+ restriction, but if K+ starts to trend back up, should be resumed. Pt specially states he doesn't like our scrambled eggs and has asked for hard boiled, but doesn't receive. No other food restrictions/ preferences given. He will eat pork/ beef, but not in ways that we usually prepare. For example, will eat a burger, but won't eat the beef stroganoff. Will eat soriano, but not a pork chop. Would eat fish over chicken most of the time. 1/30: initial. PU MST received. WOCN following for DTI to right heel. Familiar with pt from previous admission. Spoke with pt at bedside. Pt recently discharged. He reports good PO intakes for a while when he first went home but his intakes have decreased for a few days. Intakes since admission are documented as 26-75% meals. Breakfast tray observed in room, he ate 25% eggs, sausage. Discussed ONS preferences. Pt states he has been drinking Premier Protein shakes at home, is agreeable to receive Ensure High Protein here, prefers vanilla. During previous admission, pt's wife was bringing in food from home for pt. Multiple snacks on bedside table today from home.       Weight hx in EMR indicates 9% weight loss over last 2 months which is significant for time frame. This is consistent with wife's reported UBW of pt as 135 lbs on 12/5. Labs: Na+ 131, , HgbA1c 7.7      Nutritionally Significant Medications:  Amiodarone, Lantus 8 units, Humalog 6 units with meals, SSI, Mag-Ox, milrinone, Protonix      Estimated Daily Nutrient Needs:  Energy Requirements Based On: Kcal/kg  Weight Used for Energy Requirements: Current (57.9 kg)  Energy (kcal/day): 2000+ (35+ kcal/kg)  Weight Used for Protein Requirements: Current  Protein (g/day): 85 (1.5 gm/kg)  Method Used for Fluid Requirements: 1 ml/kcal  Fluid (ml/day): 2000 (CHF)    Nutrition Related Findings:   Edema: LLE: 1+; Pitting (2/6/2023  8:00 AM)  LUE: Non-pitting (2/5/2023  8:00 AM)  RLE: 1+; Pitting (2/6/2023  8:00 AM)  RUE: Non-pitting (2/5/2023  8:00 AM)    Last BM: 02/03/23, Loose    Wounds: Deep tissue injury, Multiple (DTI to right heel, wound to left ankle, WOCN following)      Current Nutrition Therapies:  Diet: Regular, low K+  Supplements: Ensure High Protein BID  Meal intake: Patient Vitals for the past 168 hrs:   % Diet Eaten   02/05/23 1800 0%   02/05/23 0900 26 - 50%   02/04/23 1700 26 - 50%   02/04/23 1200 76 - 100%   02/03/23 1200 1 - 25%   02/03/23 0800 1 - 25%   02/02/23 1200 1 - 25%   02/02/23 0938 1 - 25%   02/01/23 0955 76 - 100%   02/01/23 0900 51 - 75%   01/31/23 1516 26 - 50%   01/31/23 0903 26 - 50%   01/30/23 1525 76 - 100%     Supplement intake: Patient Vitals for the past 168 hrs:   Supplement intake %   02/04/23 0900 51 - 75%     Nutrition Support: none      Anthropometric Measures:  Height: 5' 9\" (175.3 cm)  Ideal Body Weight (IBW): 160 lbs (73 kg)     Current Body Wt:  57.9 kg (127 lb 10.3 oz), 79.8 % IBW.  Standing scale  Current BMI (kg/m2): 18.8        Weight Adjustment: No adjustment                 BMI Category: Underweight (BMI less than 22) age over 72    Last 3 Recorded Weights in this Encounter    02/04/23 0700 02/05/23 0700 02/06/23 0700   Weight: 56.3 kg (124 lb 1.9 oz) 57.2 kg (126 lb 1.7 oz) 57.9 kg (127 lb 10.3 oz)     Wt Readings from Last 10 Encounters:   02/06/23 57.9 kg (127 lb 10.3 oz)   01/25/23 55.8 kg (123 lb)   01/23/23 54.9 kg (121 lb)   01/21/23 54.4 kg (120 lb)   01/20/23 52.2 kg (115 lb)   01/20/23 52.2 kg (115 lb)   01/19/23 53 kg (116 lb 13.5 oz)   12/19/22 58.5 kg (129 lb)   12/16/22 57.4 kg (126 lb 8.7 oz)   12/05/22 59 kg (130 lb)           Nutrition Diagnosis:   Inadequate oral intake related to cardiac dysfunction as evidenced by intake 26-50%  Underweight related to inadequate protein-energy intake as evidenced by BMI (18)    Nutrition Interventions:   Food and/or Nutrient Delivery: Modify current diet, Modify oral nutrition supplement  Nutrition Education/Counseling: Counseling initiated  Coordination of Nutrition Care: Continue to monitor while inpatient       Goals:  Previous Goal Met: Progressing toward goal(s)  Goals: other (specify)  Specify Other Goals: PO intakes > 75% meals + ONS to promote weight gain of 0.5-1 lb over next 5-7 days    Nutrition Monitoring and Evaluation:   Behavioral-Environmental Outcomes: None identified  Food/Nutrient Intake Outcomes: Food and nutrient intake, Supplement intake  Physical Signs/Symptoms Outcomes: Biochemical data, GI status, Fluid status or edema, Meal time behavior, Nutrition focused physical findings, Weight    Discharge Planning:    Continue oral nutrition supplement    Recent Labs     02/06/23 0327 02/05/23 0413 02/04/23  0442   GLU 93 267* 140*   BUN 31* 39* 36*   CREA 1.68* 1.95* 2.16*    134* 137   K 4.4 4.5 4.8   * 106 107   CO2 25 23 23   CA 8.9 8.9 8.6   PHOS  --   --  3.7   MG 1.9 1.8 2.0       Recent Labs     02/06/23  0327 02/05/23  0413 02/04/23  0442   ALT 14 14 14   AST 12* 11* 11*   AP 97 95 96   TBILI 0.6 0.6 0.5   TP 5.9* 6.0* 5.7*   ALB 3.1* 3.4* 2.9*   GLOB 2.8 2.6 2.8     Recent Labs     02/06/23  0650 02/05/23  2137 02/05/23  1622 02/05/23  1222 02/05/23  0702 02/04/23  2212 02/04/23  2149 02/04/23  1612 02/04/23  1203 02/04/23  0707 02/03/23  2141 02/03/23  1650   GLUCPOC 85 110 245* 176* 190* 88 59* 165* 148* 127* 230* 84       Lab Results   Component Value Date/Time    Hemoglobin A1c 7.7 (H) 01/11/2023 06:14 PM    Hemoglobin A1c 6.5 (H) 12/06/2022 03:53 AM    Hemoglobin A1c 7.0 (H) 10/12/2022 09:10 AM       Ofe Noel RD  Available via [employer]

## 2023-02-06 NOTE — PROGRESS NOTES
CRITICAL CARE ADMISSION NOTE      Name: Peterson Buck   : 1950   MRN: [id number]   Date: 2023      Reason for ICU Admission: Acute on chronic HFrEF, LVAD workup     ICU PROBLEM LIST   Acute on chronic HFrEF (20%) NYHA IIIb/IV (D) on home inotropic support, life vest  TANNER on CKD3  Aflutter w/ RVR  Acute on chronic hypoxemic respiratory failure  CAP  CAD  Gastric neuroendocrine tumor  Hx of LUE DVT  DM  PAD  TRISTAN  BPH w/ urinary retention requiring chronic donnelly    HISTORY OF PRESENT ILLNESS:   Pt is a 71 y.o M w/ PMH as noted above admitted to [facility] on  due to ongoing symptoms secondary to his HFrEF. Treated for possible CAP, and diuresed for acute on chronic HFrEF. Nephrology following for TANNER on CKD 3. AHF team recommended transfer to CVICU for [facility] placement and ongoing LVAD workup. 24 HOUR EVENTS:   No acute events, fatigue primary complaint. NEUROLOGICAL:    Mentation appropriate  Delirium precautions  Encourage work w/ PT/OT    PULMONOLOGY:   O2 per NC PRN for spO2 >92%    CARDIOVASCULAR:   Scheduled and PRN bumex  Goal net negative  C/w milrinone dose adjusted per HF, started on dobutamine  Telemetry, close hemodynamic monitoring  Unable to tolerate BB, ARNI/ARB due to , aldactone held 2/2 elevated K   C/w ASA, amiodarone, statin  Life Vest  Ongoing LVAD workup by AHF team  Monitor for signs of hypoperfusion, monitor UOP    GASTROINTESTINAL:   PPI  Cardiac, renal, diabetic diet as tolerated      RENAL/ELECTROLYTE/FLUIDS:   Strict I/Os  Nephrology following  Monitor RFP  Mg/Phos/K >2/3/4  Donnelly to remain in place  PRN bumex    ENDOCRINE:   SSI, Basal insulin  Hypoglycemic protocol  Glucose goal 120-180    HEMATOLOGY/ONCOLOGY:   On eliquis  EPO weekly  Gastric neuroendocrine tumor, well differentiated, good prognosis per onc.    Not barrier to LVAD implant    ID/MICRO:   S/p CAP treatment     ICU DAILY CHECKLIST     Code Status:Full  DVT Prophylaxis:Eliquis  T/L/D: PIV, PAC, Donnelly  SUP: PPI  Diet: ADAT  Activity Level:ad jose f  ABCDEF Bundle/Checklist Completed:Yes  Disposition: Stay in ICU  Multidisciplinary Rounds Completed:  Pending  Patient/Family Updated: Yes    Tatiana   2/3 Transferred to CVICU for [facility] placement    SUBJECTIVE:     As noted above    Review of Systems:     Review of Systems   Constitutional:  Positive for malaise/fatigue. Negative for chills and fever. HENT:  Negative for congestion and sinus pain. Eyes:  Negative for blurred vision, double vision and pain. Respiratory:  Positive for shortness of breath. Negative for hemoptysis and sputum production. Cardiovascular:  Negative for chest pain, palpitations and leg swelling. Gastrointestinal:  Negative for abdominal pain, nausea and vomiting. Genitourinary:  Negative for dysuria, frequency and urgency. Musculoskeletal:  Negative for back pain, joint pain and myalgias. Skin:  Negative for itching and rash. Neurological:  Negative for tremors, sensory change, speech change and focal weakness. Psychiatric/Behavioral:  Negative for hallucinations. The patient is not nervous/anxious.        OBJECTIVE:     Labs and Data: Reviewed 23  Medications: Reviewed 23  Imaging: Reviewed 23    General - chronically ill, sarcopenia, OOBTC  HEENT- pupils equal, EOMI, anicteric  Neuro - moves all extrem, follows basic commands, no focal deficit  CV - RRR, PAC+  Resp - rales b/l, NC  Abd - soft, nontender, no guarding  MSK- intact, no sacral ulcer      Visit Vitals  /64 (BP 1 Location: Left upper arm, BP Patient Position: At rest)   Pulse (!) 101   Temp 98.8 °F (37.1 °C)   Resp (!) 37   Ht 5' 9\" (1.753 m)   Wt 57.9 kg (127 lb 10.3 oz)   SpO2 96%   BMI 18.85 kg/m²    O2 Flow Rate (L/min): 4 l/min O2 Device: Nasal cannula Temp (24hrs), Av.8 °F (36.6 °C), Min:97.1 °F (36.2 °C), Max:98.8 °F (37.1 °C)    CVP (mmHg): 8 mmHg (23 1600)      Intake/Output:     Intake/Output Summary (Last 24 hours) at 2/6/2023 1645  Last data filed at 2/6/2023 1600  Gross per 24 hour   Intake 668.7 ml   Output 1550 ml   Net -881.3 ml         Imaging    01/26/23    ECHO ADULT FOLLOW-UP OR LIMITED 01/27/2023 1/27/2023    Interpretation Summary    Left Ventricle: EF by visual approximation is 20%. Left ventricle is mildly dilated. Mitral Valve: Moderately thickened leaflet, at the anterior and posterior leaflets. Moderately calcified leaflet. Moderate regurgitation. Left Atrium: Left atrium is moderately dilated. Technical qualifiers: Echo study was technically difficult with poor endocardial visualization. Contrast used: Definity. Limited study, not all structures viewed    Signed by: Jen Abreu MD on 1/27/2023  4:39 PM       Pertinent imaging reviewed and interpreted as noted above    CRITICAL CARE DOCUMENTATION  I had a face to face encounter with the patient, reviewed and interpreted patient data including clinical events, labs, images, vital signs, I/O's, and examined patient. I have discussed the case and the plan and management of the patient's care with the consulting services, the bedside nurses and the respiratory therapist.      NOTE OF PERSONAL INVOLVEMENT IN CARE   This patient has a high probability of imminent, clinically significant deterioration, which requires the highest level of preparedness to intervene urgently. I participated in the decision-making and personally managed or directed the management of the following life and organ supporting interventions that required my frequent assessment to treat or prevent imminent deterioration. I personally spent 35 minutes of critical care time. This is time spent at this critically ill patient's bedside actively involved in patient care as well as the coordination of care. This does not include any procedural time which has been billed separately. Walker Mcgee MD  Staff [facility]

## 2023-02-06 NOTE — PROGRESS NOTES
Problem: Diabetes Self-Management  Goal: *Disease process and treatment process  Description: Define diabetes and identify own type of diabetes; list 3 options for treating diabetes. 2/5/2023 2042 by Harry Esquivel  Outcome: Progressing Towards Goal  2/5/2023 2040 by Harry Esquivel  Outcome: Progressing Towards Goal  Goal: *Incorporating nutritional management into lifestyle  Description: Describe effect of type, amount and timing of food on blood glucose; list 3 methods for planning meals. 2/5/2023 2042 by Harry Esquivel  Outcome: Progressing Towards Goal  2/5/2023 2040 by Harry Esquivel  Outcome: Progressing Towards Goal  Goal: *Incorporating physical activity into lifestyle  Description: State effect of exercise on blood glucose levels. 2/5/2023 2042 by Harry Esquivel  Outcome: Progressing Towards Goal  2/5/2023 2040 by Harry Esquivel  Outcome: Progressing Towards Goal  Goal: *Developing strategies to promote health/change behavior  Description: Define the ABC's of diabetes; identify appropriate screenings, schedule and personal plan for screenings. 2/5/2023 2042 by Harry Esquivel  Outcome: Progressing Towards Goal  2/5/2023 2040 by Harry Esquivel  Outcome: Progressing Towards Goal  Goal: *Using medications safely  Description: State effect of diabetes medications on diabetes; name diabetes medication taking, action and side effects. 2/5/2023 2042 by Harry Esquivel  Outcome: Progressing Towards Goal  2/5/2023 2040 by Harry Esquivel  Outcome: Progressing Towards Goal  Goal: *Monitoring blood glucose, interpreting and using results  Description: Identify recommended blood glucose targets  and personal targets.   2/5/2023 2042 by Harry Esquivel  Outcome: Progressing Towards Goal  2/5/2023 2040 by Harry Esquivel  Outcome: Progressing Towards Goal  Goal: *Prevention, detection, treatment of acute complications  Description: List symptoms of hyper- and hypoglycemia; describe how to treat low blood sugar and actions for lowering  high blood glucose level. 2/5/2023 2042 by Erika Broussard  Outcome: Progressing Towards Goal  2/5/2023 2040 by Erika Broussard  Outcome: Progressing Towards Goal  Goal: *Prevention, detection and treatment of chronic complications  Description: Define the natural course of diabetes and describe the relationship of blood glucose levels to long term complications of diabetes. 2/5/2023 2042 by Erika Broussard  Outcome: Progressing Towards Goal  2/5/2023 2040 by Erika Broussard  Outcome: Progressing Towards Goal  Goal: *Developing strategies to address psychosocial issues  Description: Describe feelings about living with diabetes; identify support needed and support network  2/5/2023 2042 by Erika Broussard  Outcome: Progressing Towards Goal  2/5/2023 2040 by Erika Broussard  Outcome: Progressing Towards Goal  Goal: *Insulin pump training  2/5/2023 2042 by Erika Broussard  Outcome: Progressing Towards Goal  2/5/2023 2040 by Erika Broussard  Outcome: Progressing Towards Goal  Goal: *Sick day guidelines  2/5/2023 2042 by Erika Broussard  Outcome: Progressing Towards Goal  2/5/2023 2040 by Erika Broussard  Outcome: Progressing Towards Goal  Goal: *Patient Specific Goal (EDIT GOAL, INSERT TEXT)  2/5/2023 2042 by Erika Broussard  Outcome: Progressing Towards Goal  2/5/2023 2040 by Erika Broussard  Outcome: Progressing Towards Goal     Problem: Patient Education: Go to Patient Education Activity  Goal: Patient/Family Education  2/5/2023 2042 by Erika Broussard  Outcome: Progressing Towards Goal  2/5/2023 2040 by Erika Broussard  Outcome: Progressing Towards Goal     Problem: Pressure Injury - Risk of  Goal: *Prevention of pressure injury  Description: Document Mike Scale and appropriate interventions in the flowsheet.   2/5/2023 2042 by Erika Broussard  Outcome: Progressing Towards Goal  Note: Pressure Injury Interventions:  Sensory Interventions: Assess changes in LOC, Assess need for specialty bed, Avoid rigorous massage over bony prominences, Check visual cues for pain, Discuss PT/OT consult with provider, Float heels, Maintain/enhance activity level, Keep linens dry and wrinkle-free, Minimize linen layers, Monitor skin under medical devices    Moisture Interventions: Internal/External urinary devices    Activity Interventions: Increase time out of bed, Pressure redistribution bed/mattress(bed type), PT/OT evaluation    Mobility Interventions: Float heels, Pressure redistribution bed/mattress (bed type), Turn and reposition approx. every two hours(pillow and wedges), PT/OT evaluation    Nutrition Interventions: Document food/fluid/supplement intake, Discuss nutritional consult with provider    Friction and Shear Interventions: Apply protective barrier, creams and emollients, Lift sheet, Minimize layers             2/5/2023 2040 by Lisa Ambriz  Outcome: Progressing Towards Goal  Note: Pressure Injury Interventions:  Sensory Interventions: Assess changes in LOC, Assess need for specialty bed, Avoid rigorous massage over bony prominences, Check visual cues for pain, Discuss PT/OT consult with provider, Float heels, Maintain/enhance activity level, Keep linens dry and wrinkle-free, Minimize linen layers, Monitor skin under medical devices    Moisture Interventions: Internal/External urinary devices    Activity Interventions: Increase time out of bed, Pressure redistribution bed/mattress(bed type), PT/OT evaluation    Mobility Interventions: Float heels, Pressure redistribution bed/mattress (bed type), Turn and reposition approx.  every two hours(pillow and wedges), PT/OT evaluation    Nutrition Interventions: Document food/fluid/supplement intake, Discuss nutritional consult with provider    Friction and Shear Interventions: Apply protective barrier, creams and emollients, Lift sheet, Minimize layers                Problem: Patient Education: Go to Patient Education Activity  Goal: Patient/Family Education  2/5/2023 2042 by Lisa Ambriz  Outcome: Progressing Towards Goal  2/5/2023 2040 by Karen Mcbride  Outcome: Progressing Towards Goal     Problem: Falls - Risk of  Goal: *Absence of Falls  Description: Document Aby Melvin Fall Risk and appropriate interventions in the flowsheet.   2/5/2023 2042 by Karen Mcbride  Outcome: Progressing Towards Goal  Note: Fall Risk Interventions:  Mobility Interventions: Communicate number of staff needed for ambulation/transfer, Mechanical lift, PT Consult for assist device competence, PT Consult for mobility concerns         Medication Interventions: Patient to call before getting OOB, Teach patient to arise slowly    Elimination Interventions: Call light in reach, Toilet paper/wipes in reach, Toileting schedule/hourly rounds    History of Falls Interventions: Assess for delayed presentation/identification of injury for 48 hrs (comment for end date), Room close to nurse's station, Investigate reason for fall      2/5/2023 2040 by Karen Mcbride  Outcome: Progressing Towards Goal  Note: Fall Risk Interventions:  Mobility Interventions: Communicate number of staff needed for ambulation/transfer, Mechanical lift, PT Consult for assist device competence, PT Consult for mobility concerns         Medication Interventions: Patient to call before getting OOB, Teach patient to arise slowly    Elimination Interventions: Call light in reach, Toilet paper/wipes in reach, Toileting schedule/hourly rounds    History of Falls Interventions: Assess for delayed presentation/identification of injury for 48 hrs (comment for end date), Room close to nurse's station, Investigate reason for fall         Problem: Patient Education: Go to Patient Education Activity  Goal: Patient/Family Education  2/5/2023 2042 by Karen Mcbride  Outcome: Progressing Towards Goal  2/5/2023 2040 by Karen Mcbride  Outcome: Progressing Towards Goal     Problem: Pain  Goal: *Control of Pain  2/5/2023 2042 by Karen Mcbride  Outcome: Progressing Towards Goal  2/5/2023 2040 by Karen Mcbride  Outcome: Progressing Towards Goal  Goal: *PALLIATIVE CARE:  Alleviation of Pain  2/5/2023 2042 by Lorrene Broody  Outcome: Progressing Towards Goal  2/5/2023 2040 by Lorrene Broody  Outcome: Progressing Towards Goal     Problem: Patient Education: Go to Patient Education Activity  Goal: Patient/Family Education  2/5/2023 2042 by Lorrene Broody  Outcome: Progressing Towards Goal  2/5/2023 2040 by Lorrene Broody  Outcome: Progressing Towards Goal     Problem: Patient Education: Go to Patient Education Activity  Goal: Patient/Family Education  2/5/2023 2042 by Lorrene Broody  Outcome: Progressing Towards Goal  2/5/2023 2040 by Lorrene Broody  Outcome: Progressing Towards Goal     Problem: Patient Education: Go to Patient Education Activity  Goal: Patient/Family Education  2/5/2023 2042 by Lorrene Broody  Outcome: Progressing Towards Goal  2/5/2023 2040 by Lorrene Broody  Outcome: Progressing Towards Goal

## 2023-02-06 NOTE — PROGRESS NOTES
[facility] in [place], [place]  Inpatient Progress Note      Patient name: Romain Wright  Patient : 1950  Patient MRN: [id number]  Consulting MD: Carlos Lemon MD  Date of service: 23    REASON FOR REFERRAL:  Management of chronic systolic heart failure     PLAN OF CARE:  70 y/o male with likely combined non-ischemic and ischemic cardiomyopathy, LVEF 25-30%, stage D, NYHA class IIIB/IV; initiated on home milrinone gtt as bridge to completion of LVAD workup  Most likely etiology of cardiomyopathy: CAD +/- TRISTAN +/- inappropriate sinus tach +/- undergoing workup; not candidate for revascularization per Dr. 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   Patient was approved for LVAD implantation as destination therapy at [facility] 2/3/23 with tentative plans to proceed to OR on 2/15/23; currently undergoing SGC-guided optimization in CV-ICU   The following have been identified as relative concerns pertaining to LVAD candidacy: small body surface area, cachexia, BMI 18, malnutrition, frailty, advanced age, muscular deconditioning, PVD (fingertips), urinary retention requiring donnelly catheter, neuroendocrine tumor class 1 requiring treatment after LVAD, heal injury requiring wound care consult, concerns regarding neurocognitive dysfunction, chronic kidney disease and hearing loss without functioning hearing aid  Plan for family meeting this week with palliative care team,  and AHF team      RECOMMENDATIONS:  Continued hemodynamic monitoring with SGC, discuss DC SGC tomorrow if CI at goal  Increase milrinone 0.375 mcg/kg/min; dose to current weight 127lbs   May need addition of dobutamine to optimize renal function- hold off for now   No beta-blockers due to hypotension and RV conditioning prior to LVAD  Cannot tolerate ARB/ARNi due to hypotension  Cannot tolerate spironolactone due to hyperkalemia  Cannot tolerate jardiance due to significant diuresis on smallest dose/contributed to IVVD  Discontinued corlanor due to SVT  Digoxin stopped d/t risk for toxicity with amio loading; monitoring level  Continue amiodarone, appreciate EP cardiology recs  Hold lasix and potassium; montior renal function  Appreciate nephrology recommendations   Continue Mag ox 400 mg daily   Keep K+ >4 and Mg >2  Continue eliquis 5mg twice daily; discontinue on 2/10 to bilivarudin (will confirm with Dr. Denisha Garcia)  S/p Venofer 200mg x 2 doses, check iron profile tomorrow  Transfuse to keep hgb closer to 8 for potential surgical procedure  Give albumin x once  Abx/treatment of suspected PNA per hospitalist  Continue lifevest  Continue baby aspirin   Plavix previously stopped, okayed by Dr. Francis Horne consult pending (high risk for TRISTAN)  Heel pressure ulcer- wound care consulted  VAD evaluation completed; VAD coordinator to provide education  Check EKG for QTc  Check pCXR daily for SGC position  Check labs in AM: prealbumin, procalcitonin, CRP-Q, ESR, folate, LDH, iron profile  Focus on physical therapy and ambulate daily  Incentive spirometry and wean oxygen [place]  Nutritionist consultation   consultation and daily support; help with hearing aids  Schedule family meeting this week to discuss upcoming operation     All other care per primary team      INTERVAL HISTORY:  Hemodynamics stable   Milrinone remains at 0.3mcg  Hgb improved to 8.5  Cr slightly improved to 1.9  Net neg even  Pro BNP down slightly today IMPRESSION:  Acute on chronic combined systolic/diastolic  heart failure  Stage D, NYHA class IIIB/IV symptoms   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, LVEF 25-30% and 23% (by cMRI 1/3/23)  Cardiac MRI suggestive of ischemic cardiomyopathy  PYP equivocal  Chronic milrinone infusion as palliation   Coronary artery disease  CAD s/p CABG x 2: further disease best managed medically due to small vessel size   At risk of sudden cardiac death  Peripheral arterial disease  Bilateral hydronephrosis s/p donnelly  Cardiac risk factors:  HTN  HL  TRISTAN, STOP-BANG 4  DM2  CKD, stage 3  MOCA from 1/4/22 17/30, consistent with mild cognitive impairment  Hard of hearing  Gastric Neuroendocrine Tumor, elevated gastrin and chromogranin A  Sepsis, unclear source       INTERVAL EVENTS:  Feels well  Hard of hearing, hearing aids not working  Some shortness of breath  -110s/50s, HR 80-90s  I/O net negative 224cc, urine 1 liters  Hgb 8.7  Cr 1.68 from 1.95  K 4.4  Albumin 3.1  NT [id number]    LIFE GOALS:  Lifestyle goals reviewed with the patient. Patient's personal goals include: having a few more years with family  Important upcoming milestones or family events: None at this time   The patient identifies the following as important for living well: being at home, not being SOB              CARDIAC IMAGING:  Echo 1/27/23    Left Ventricle: EF by visual approximation is 20%. Left ventricle is mildly dilated. Mitral Valve: Moderately thickened leaflet, at the anterior and posterior leaflets. Moderately calcified leaflet. Moderate regurgitation. Left Atrium: Left atrium is moderately dilated. Technical qualifiers: Echo study was technically difficult with poor endocardial visualization. Contrast used: Definity. Limited study, not all structures viewed     Echo 1/9/23   Left Ventricle: Severely reduced left ventricular systolic function with a visually estimated EF of 25 - 30%.  Left ventricle size is normal. Mildly increased wall thickness. Echo 12/26/22    Left Ventricle: Severely reduced left ventricular systolic function with a visually estimated EF of 25 - 30%. Left ventricle size is normal. Normal wall thickness. There are regional wall motion abnormalities. Grade II diastolic dysfunction with increased LAP. Right Ventricle: Moderately reduced systolic function. TAPSE is abnormal. TAPSE is 1.1 cm. Aortic Valve: Mild stenosis of the aortic valve. AV peak gradient is 13 mmHg. AV peak velocity is 1.8 m/s. Mitral Valve: Not well visualized. Moderate annular calcification at the posterior leaflet of the mitral valve. Mild to moderate regurgitation. Tricuspid Valve: Mildly elevated RVSP. Left Atrium: Left atrium is moderately dilated. 12/8/22    Left Ventricle: Moderately reduced left ventricular systolic function with a visually estimated EF of 35 - 40%. Severe hypokinesis of the following segments: mid anteroseptal, apical anterior, apical septal, apical inferior and apical lateral. Severe hypokinesis of the apex. Mitral Valve: Severely thickened leaflet, at the anterior and posterior leaflets. Severely calcified leaflet, at the anterior and posterior leaflets. Mild annular calcification of the mitral valve. Moderate regurgitation. Left Atrium: Left atrium is mildly dilated. Contrast used: Definity. limited study     EKG 12/22/22 ST, Biatria enlargement, marked ST abnormality     [facility] 12/6/22  1. Normal LVEDP  2. Severe native multivessel coronary artery disease  3. Patent LIMA to LAD and vein graft to distal RCA  4. Recurrent ISR in OM1 stent with now 60 to 70% restenosis  5. Recoil of left main and circumflex stent with now recurrent 40 to 50% stenosis. 6.  Progression of ostial left main disease now to about 60% stenosis  7. Progression of disease in jailed first marginal branch now with diffuse 90% stenosis  8.   High-grade stenosis in the mid to distal right potential femoral artery treated with 6 x 40 mm impact drug-coated balloon angioplasty to reduce the stenosis to less than 40%     NST        HEMODYNAMICS:  RHC 1/9/23  PA 20/9, RA 3, PCWP 8, CI 1.8     CPEST too ill   6MW 300 feet        HPI:  71 y.o. male who was admitted via ED for worsening SOB, poor appetite, orthopnea and fatigue. Pmhx of CAD s/p CABG, PAD, DM 2, recent admission for HFmrEF earlier this month. Serial TTEs show progressive decline in EF since 3/2021 with the most recent EF at 25-30%. Recent LHC shows diffuse CAD and no interventions indicated. Patient had [place] recently and there is some thought to myocarditis or other etiology causing worsening HF. [facility] was consulted for further evaluation and management of HFrEF. REVIEW OF SYSTEMS:  Review of Systems   Constitutional:  Positive for malaise/fatigue. Negative for chills and fever. Respiratory:  Negative for cough and shortness of breath. Cardiovascular:  Negative for chest pain, palpitations, orthopnea and leg swelling. Gastrointestinal:  Negative for abdominal pain, heartburn, nausea and vomiting. Genitourinary:         Poor appetite   Musculoskeletal:  Negative for falls. Neurological:  Positive for weakness. Negative for dizziness. Psychiatric/Behavioral:  Negative for depression. The patient is not nervous/anxious. PHYSICAL EXAM:  Visit Vitals  BP (!) 108/55 (BP 1 Location: Left upper arm, BP Patient Position: At rest)   Pulse 84   Temp 97.9 °F (36.6 °C)   Resp (!) 34   Ht 5' 9\" (1.753 m)   Wt 127 lb 10.3 oz (57.9 kg)   SpO2 100%   BMI 18.85 kg/m²     General: Patient is well developed, well-nourished in no acute distress  HEENT: Unremarkable   Neck: Supple. No evidence of thyroid enlargements or lymphadenopathy. JVD: Cannot be appreciated   Lungs: Breath sounds are equal and clear bilaterally. No wheezes, rhonchi, or rales. Heart: Regular rate and rhythm with normal S1 and S2. No murmurs, gallops or rubs.   Abdomen: Soft, no mass or tenderness. No organomegaly or hernia. Bowel sounds present. Genitourinary and rectal: deferred  Extremities: No cyanosis, clubbing, or edema. Neurologic: No focal sensory or motor deficits are noted. Grossly intact. Psychiatric: Awake, alert an doriented x 3. Appropriate mood and affect. Skin: Warm, dry and well perfused. REVIEW OF SYSTEMS:  General: Denies fever. Ear, nose and throat: Denies difficulty hearing, sinus problems, nosebleeds  Cardiovascular: see above in the interval history  Respiratory: Denies cough, wheezing, sputum production, hemoptysis.   Gastrointestinal: Denies heartburn, constipation, diarrhea, abdominal pain, nausea, blood in stool  Kidney and bladder: Denies painful urination, frequent urination  Musculoskeletal: Denies joint pain, muscle weakness  Skin and hair: Denies change in existing skin lesions    PAST MEDICAL HISTORY:  Past Medical History:   Diagnosis Date    CAD (coronary artery disease) 11/10/2016    NSTEMI & 2 stents    Deafness 10/28/2012    DM (diabetes mellitus) ([facility] [address]     Elevated cholesterol     Hypertension     NSTEMI (non-ST elevated myocardial infarction) ([facility] [address] 11/10/2016       PAST SURGICAL HISTORY:  Past Surgical History:   Procedure Laterality Date    COLONOSCOPY N/A 6/28/2018    COLONOSCOPY performed by Adry Montgomery MD at [facility] ENDOSCOPY    COLONOSCOPY N/A 1/18/2023    COLONOSCOPY performed by Wen Jones MD at [facility] ENDOSCOPY    [address]  11/11/2016    2 stents       FAMILY HISTORY:  Family History   Problem Relation Age of Onset    Heart Disease Father     Heart Attack Father     Hypertension Mother     Elevated Lipids Brother     Elevated Lipids Brother     No Known Problems Sister     Elevated Lipids Brother     No Known Problems Son     No Known Problems Daughter     Anesth Problems Neg Hx        SOCIAL HISTORY:  Social History     Socioeconomic History    Marital status:    Tobacco Use    Smoking status: Never     Passive exposure: Never    Smokeless tobacco: Never   Vaping Use    Vaping Use: Never used   Substance and Sexual Activity    Alcohol use: Yes     Alcohol/week: 2.0 standard drinks     Types: 1 Cans of beer, 1 Shots of liquor per week     Comment: rarely    Drug use: No    Sexual activity: Yes     Social Determinants of Health     Financial Resource Strain: Medium Risk    Difficulty of Paying Living Expenses: Somewhat hard   Food Insecurity: Food Insecurity Present    Worried About Running Out of Food in the Last Year: Never true    Ran Out of Food in the Last Year: Often true       LABORATORY RESULTS:     Labs Latest Ref Rng & Units 2/6/2023 2/5/2023 2/4/2023 2/3/2023 2/2/2023 2/1/2023 1/31/2023   WBC 4.1 - 11.1 K/uL 6.0 5.3 5.3 5.6 6.8 6.3 7.6   RBC 4.10 - 5.70 M/uL 3.56(L) 3.46(L) 2.93(L) 3.00(L) 3.32(L) 3.27(L) 3.66(L)   Hemoglobin 12.1 - 17.0 g/dL 8.7(L) 8.5(L) 7. 1(L) 7. 5(L) 8. 3(L) 8.0(L) 9.1(L)   Hematocrit 36.6 - 50.3 % 30. 0(L) 29. 5(L) 25. 3(L) 25. 8(L) 28. 7(L) 27. 7(L) 30. 6(L)   MCV 80.0 - 99.0 FL 84.3 85.3 86.3 86.0 86.4 84.7 83.6   Platelets 323 - 609 K/uL 206 217 257 259 304 271 307   Lymphocytes 12 - 49 % 19 15 - 17 20 21 20   Monocytes 5 - 13 % 10 10 - 9 10 10 9   Eosinophils 0 - 7 % 2 1 - 1 2 3 3   Basophils 0 - 1 % 1 1 - 1 1 1 1   Albumin 3.5 - 5.0 g/dL 3. 1(L) 3.4(L) 2. 9(L) 3.0(L) - - 3. 1(L)   Calcium 8.5 - 10.1 MG/DL 8.9 8.9 8.6 9.1 9.1 9.0 9.1   Glucose 65 - 100 mg/dL 93 267(H) 140(H) 129(H) 142(H) 94 221(H)   BUN 6 - 20 MG/DL 31(H) 39(H) 36(H) 42(H) 40(H) 33(H) 34(H)   Creatinine 0.70 - 1.30 MG/DL 1.68(H) 1.95(H) 2.16(H) 2.12(H) 2.01(H) 2.08(H) 2.09(H)   Sodium 136 - 145 mmol/L 139 134(L) 137 137 135(L) 136 131(L)   Potassium 3.5 - 5.1 mmol/L 4.4 4.5 4.8 4.6 4.9 4.9 5.4(H)   TSH 0.36 - 3.74 uIU/mL - - - - - - -   PSA 0.01 - 4.0 ng/mL - - - - - - -   LDH 85 - 241 U/L - - - - - - -   Some recent data might be hidden     Lab Results   Component Value Date/Time    TSH 3.52 01/28/2023 05:26 AM    TSH 2.12 12/27/2022 02:36 PM    TSH 4.80 (H) 12/06/2022 03:53 AM    TSH 5.39 (H) 10/12/2022 09:10 AM    TSH 3.53 02/03/2022 11:47 AM    TSH 5.790 (H) 11/21/2019 04:45 PM    TSH 3.08 06/22/2018 01:53 PM    TSH 4.250 05/26/2015 09:43 AM       ALLERGY:  No Known Allergies     CURRENT MEDICATIONS:    Current Facility-Administered Medications:     insulin glargine (LANTUS) injection 8 Units, 8 Units, SubCUTAneous, DAILY, Cathy Sheldon, CNS, 8 Units at 02/06/23 0906    magnesium oxide (MAG-OX) tablet 400 mg, 400 mg, Oral, DAILY, Shaila, Lizette B, NP, 400 mg at 02/06/23 0905    0.9% sodium chloride infusion, 6 mL/hr, IntraVENous, CONTINUOUS, Malinda Mchugh MD, Last Rate: 6 mL/hr at 02/06/23 0752, 6 mL/hr at 02/06/23 0752    milrinone (PRIMACOR) 20 MG/100 ML D5W infusion, 0.3 mcg/kg/min, IntraVENous, CONTINUOUS, Shaila, Lizette B, NP, Last Rate: 6.3 mL/hr at 02/06/23 0917, 0.375 mcg/kg/min at 02/06/23 0917    0.9% sodium chloride infusion 250 mL, 250 mL, IntraVENous, PRN, Shaila, Lizette B, NP    alteplase (CATHFLO) 1 mg in sterile water (preservative free) 1 mL injection, 1 mg, InterCATHeter, PRN, Shaila, Lizette B, NP    bacitracin 500 unit/gram packet 1 Packet, 1 Packet, Topical, PRN, Shaila, Lizette B, NP    bumetanide (BUMEX) injection 1 mg, 1 mg, IntraVENous, Q4H PRN, Shaila, Lizette B, NP, 1 mg at 02/04/23 1257    epoetin heidy-epbx (RETACRIT) injection 10,000 Units, 10,000 Units, SubCUTAneous, Q7D, Jadiel Galvan MD, 10,000 Units at 02/02/23 2213    senna-docusate (PERICOLACE) 8.6-50 mg per tablet 1 Tablet, 1 Tablet, Oral, DAILY PRN, Terrell STALEY, JACOB    polyethylene glycol (MIRALAX) packet 17 g, 17 g, Oral, DAILY PRN, Terrell STALEY, NP    insulin lispro (HUMALOG) injection 6 Units, 6 Units, SubCUTAneous, TID WITH MEALS, Cathy Sheldon, CNS, 6 Units at 02/06/23 0905    traZODone (DESYREL) tablet 50 mg, 50 mg, Oral, QHS PRN, Joana Robertson MD, 50 mg at 02/04/23 5782 [COMPLETED] apixaban (ELIQUIS) tablet 10 mg, 10 mg, Oral, BID, 10 mg at 02/06/23 0905 **FOLLOWED BY** apixaban (ELIQUIS) tablet 5 mg, 5 mg, Oral, BID, Hafsa Steele MD    glucose chewable tablet 16 g, 4 Tablet, Oral, PRN, Ita Harvey NP    glucagon (GLUCAGEN) injection 1 mg, 1 mg, IntraMUSCular, PRN, Ita Harvey NP    dextrose 10 % infusion 0-250 mL, 0-250 mL, IntraVENous, PRN, Ita Harvey NP    insulin lispro (HUMALOG) injection, , SubCUTAneous, AC&HS, Ita Harvey NP, 3 Units at 02/05/23 1728    balsam peru-castor oiL (VENELEX) ointment, , Topical, BID, Raiza Youngblood MD, Given at 02/05/23 1729    [Held by provider] metoprolol succinate (TOPROL-XL) XL tablet 12.5 mg, 12.5 mg, Oral, Q12H, Catrachita Santos MD, 12.5 mg at 02/02/23 2200    lidocaine 4 % patch 1 Patch, 1 Patch, TransDERmal, Q24H, Hafsa Steele MD, 1 Patch at 02/05/23 1132    amiodarone (CORDARONE) tablet 400 mg, 400 mg, Oral, BID, Naheed Levin MD, 400 mg at 02/06/23 0372    aspirin delayed-release tablet 81 mg, 81 mg, Oral, DAILY, Heber Barajas MD, 81 mg at 02/06/23 0905    sodium chloride (NS) flush 5-40 mL, 5-40 mL, IntraVENous, Q8H, Heber Barajas MD, 10 mL at 02/06/23 0648    sodium chloride (NS) flush 5-40 mL, 5-40 mL, IntraVENous, PRN, Heber Barajas MD    acetaminophen (TYLENOL) tablet 650 mg, 650 mg, Oral, Q6H PRN, 650 mg at 02/05/23 2132 **OR** acetaminophen (TYLENOL) suppository 650 mg, 650 mg, Rectal, Q6H PRN, Heber Barajas MD    polyethylene glycol (MIRALAX) packet 17 g, 17 g, Oral, DAILY PRN, Heber Barajas MD, 17 g at 02/02/23 1539    ondansetron (ZOFRAN ODT) tablet 4 mg, 4 mg, Oral, Q8H PRN, 4 mg at 01/30/23 1357 **OR** ondansetron (ZOFRAN) injection 4 mg, 4 mg, IntraVENous, Q6H PRN, Heber Barajas MD, 4 mg at 02/03/23 1926    heparin (porcine) pf 500 Units, 500 Units, InterCATHeter, PRN, Heber Barajas MD    pantoprazole (PROTONIX) tablet 40 mg, 40 mg, Oral, ACB, Jenn, Heber STALEY MD, 40 mg at 02/06/23 0648    rosuvastatin (CRESTOR) tablet 20 mg, 20 mg, Oral, QHS, Heber Barajas MD, 20 mg at 02/05/23 2132    heparin (porcine) pf 500 Units, 500 Units, InterCATHeter, DAILY, Heber Barajas MD, 500 Units at 02/05/23 0840    PATIENT CARE TEAM:  Patient Care Team:  Berny Cedeno MD as PCP - General (Family Medicine)  Berny Cedeno MD as PCP - [facility]  Jan Mckeon MD (Cardiovascular Disease Physician)  Porsha Lebron MD (Gastroenterology)  Jewel Woody MD (Cardiothoracic Surgery)  Nathen Thomas MD (Cardiovascular Disease Physician)  Josi Berry MD (Nephrology)  Kike Rodriguez RN as Care Transitions Nurse  Jesse Kohler MD (Pulmonary Disease)  Kamila Wilson MD ([address] [place] [address] Vascular Surgery)     Thank you for allowing me to participate in this patient's care.     Tiesha Quintanilla MD   [address] [place] [address], [address]  Phone: [phone]

## 2023-02-06 NOTE — PROGRESS NOTES
RENAL  PROGRESS NOTE        Subjective:    Feels better  Objective:   VITALS SIGNS:    Visit Vitals  BP (!) 114/54   Pulse 91   Temp 97.9 °F (36.6 °C)   Resp (!) 32   Ht 5' 9\" (1.753 m)   Wt 57.9 kg (127 lb 10.3 oz)   SpO2 96%   BMI 18.85 kg/m²       O2 Device: Nasal cannula   O2 Flow Rate (L/min): 4 l/min   Temp (24hrs), Av.6 °F (36.4 °C), Min:97.1 °F (36.2 °C), Max:98.1 °F (36.7 °C)         PHYSICAL EXAM:  NAD  Some edema    DATA REVIEW:     INTAKE / OUTPUT:   Last shift:      701 - 1900  In: 262.2 [P.O.:240; I.V.:22.2]  Out: 100 [Urine:100]  Last 3 shifts: 1901 -  07  In: 1501.8 [P.O.:960; I.V.:541.8]  Out: 1650 [Urine:1650]    Intake/Output Summary (Last 24 hours) at 2023 1158  Last data filed at 2023 0900  Gross per 24 hour   Intake 735.3 ml   Output 1175 ml   Net -439.7 ml         LABS:   Recent Labs     23  0327 23  0413 23  0442   WBC 6.0 5.3 5.3   HGB 8.7* 8.5* 7.1*   HCT 30.0* 29.5* 25.3*    217 257     Recent Labs     23  03223  0413 23  0442    134* 137   K 4.4 4.5 4.8   * 106 107   CO2    GLU 93 267* 140*   BUN 31* 39* 36*   CREA 1.68* 1.95* 2.16*   CA 8.9 8.9 8.6   MG 1.9 1.8 2.0   PHOS  --   --  3.7   ALB 3.1* 3.4* 2.9*   TBILI 0.6 0.6 0.5   ALT 14 14 14         Assessment:  TANNER on CKD-3a: Suspect cardiorenal effects with needed diuresis. Hypotension/poor renal perfusion likely culprit. Recent baseline Cr has been in the low 1s. Hyperkalemia: better     Ischemic CM: Recurrent exacerbations. EF 20%. On continuous Milrinone since last admission.  Interested in LVAD-> sorting out candidacy     BPH: s/p donnelly     Well differentiated NET Grade 1: noted on EGD 23     Anemia 2 to CKD:Hgb sub-optimal     Hyponatremia: mild-> better     Left UE basilic and radial vein thrombus     Plan/Recommendations:  Creatinine slowly  better  Now in CVICU for [facility] placement and continued LVAD w/u      BRIGHT (last dose 2/2/23)  Appreciate oncology input   Discussed with him  Spike Gaspar MD

## 2023-02-06 NOTE — PROGRESS NOTES
Contacted pt. Insurance provider. Pt. Is covered up to $3600 for new hearing aide, but will need new esam. SW contacted Dr. Tanvi Best [facility] to inquire if they still conduct inpatient exams. Address: [address], [place], [address] [facility]  Phone: [phone].  Left  for nurse hotline

## 2023-02-06 NOTE — PROGRESS NOTES
2000 Bedside shift change report given to [address] (oncoming nurse) by Tom Leon (offgoing nurse). Report included the following information SBAR, Kardex, ED Summary, Procedure Summary, Intake/Output, MAR, Accordion, Recent Results, and Cardiac Rhythm NSR . Patient on 2L oxygen at change of shift, but complaining of SOB at 92%. Oxygen increased to 6L. Otherwise uneventful shift. 0730 Bedside shift change report given to [address] (oncoming nurse) by Rohit Santiago RN (offgoing nurse). Report included the following information SBAR, Kardex, ED Summary, Procedure Summary, Intake/Output, MAR, Accordion, Recent Results, and Cardiac Rhythm NSR .   _________________________________________________  Problem: Diabetes Self-Management  Goal: *Disease process and treatment process  Description: Define diabetes and identify own type of diabetes; list 3 options for treating diabetes. Outcome: Progressing Towards Goal  Goal: *Incorporating nutritional management into lifestyle  Description: Describe effect of type, amount and timing of food on blood glucose; list 3 methods for planning meals. Outcome: Progressing Towards Goal  Goal: *Incorporating physical activity into lifestyle  Description: State effect of exercise on blood glucose levels. Outcome: Progressing Towards Goal  Goal: *Developing strategies to promote health/change behavior  Description: Define the ABC's of diabetes; identify appropriate screenings, schedule and personal plan for screenings. Outcome: Progressing Towards Goal  Goal: *Using medications safely  Description: State effect of diabetes medications on diabetes; name diabetes medication taking, action and side effects. Outcome: Progressing Towards Goal  Goal: *Monitoring blood glucose, interpreting and using results  Description: Identify recommended blood glucose targets  and personal targets.   Outcome: Progressing Towards Goal  Goal: *Prevention, detection, treatment of acute complications  Description: List symptoms of hyper- and hypoglycemia; describe how to treat low blood sugar and actions for lowering  high blood glucose level. Outcome: Progressing Towards Goal  Goal: *Prevention, detection and treatment of chronic complications  Description: Define the natural course of diabetes and describe the relationship of blood glucose levels to long term complications of diabetes. Outcome: Progressing Towards Goal  Goal: *Developing strategies to address psychosocial issues  Description: Describe feelings about living with diabetes; identify support needed and support network  Outcome: Progressing Towards Goal  Goal: *Insulin pump training  Outcome: Progressing Towards Goal  Goal: *Sick day guidelines  Outcome: Progressing Towards Goal  Goal: *Patient Specific Goal (EDIT GOAL, INSERT TEXT)  Outcome: Progressing Towards Goal     Problem: Patient Education: Go to Patient Education Activity  Goal: Patient/Family Education  Outcome: Progressing Towards Goal     Problem: Pressure Injury - Risk of  Goal: *Prevention of pressure injury  Description: Document Mike Scale and appropriate interventions in the flowsheet. Outcome: Progressing Towards Goal  Note: Pressure Injury Interventions:  Sensory Interventions: Assess changes in LOC, Assess need for specialty bed, Avoid rigorous massage over bony prominences, Check visual cues for pain, Discuss PT/OT consult with provider, Float heels, Maintain/enhance activity level, Keep linens dry and wrinkle-free, Minimize linen layers, Monitor skin under medical devices    Moisture Interventions: Internal/External urinary devices    Activity Interventions: Increase time out of bed, Pressure redistribution bed/mattress(bed type), PT/OT evaluation    Mobility Interventions: Float heels, Pressure redistribution bed/mattress (bed type), Turn and reposition approx.  every two hours(pillow and wedges), PT/OT evaluation    Nutrition Interventions: Document food/fluid/supplement intake, Discuss nutritional consult with provider    Friction and Shear Interventions: Apply protective barrier, creams and emollients, Lift sheet, Minimize layers                Problem: Patient Education: Go to Patient Education Activity  Goal: Patient/Family Education  Outcome: Progressing Towards Goal     Problem: Falls - Risk of  Goal: *Absence of Falls  Description: Document Gonzalez Pancoast Fall Risk and appropriate interventions in the flowsheet.   Outcome: Progressing Towards Goal  Note: Fall Risk Interventions:  Mobility Interventions: Communicate number of staff needed for ambulation/transfer, Mechanical lift, PT Consult for assist device competence, PT Consult for mobility concerns         Medication Interventions: Patient to call before getting OOB, Teach patient to arise slowly    Elimination Interventions: Call light in reach, Toilet paper/wipes in reach, Toileting schedule/hourly rounds    History of Falls Interventions: Assess for delayed presentation/identification of injury for 48 hrs (comment for end date), Room close to nurse's station, Investigate reason for fall         Problem: Patient Education: Go to Patient Education Activity  Goal: Patient/Family Education  Outcome: Progressing Towards Goal     Problem: Pain  Goal: *Control of Pain  Outcome: Progressing Towards Goal  Goal: *PALLIATIVE CARE:  Alleviation of Pain  Outcome: Progressing Towards Goal     Problem: Patient Education: Go to Patient Education Activity  Goal: Patient/Family Education  Outcome: Progressing Towards Goal     Problem: Patient Education: Go to Patient Education Activity  Goal: Patient/Family Education  Outcome: Progressing Towards Goal     Problem: Patient Education: Go to Patient Education Activity  Goal: Patient/Family Education  Outcome: Progressing Towards Goal

## 2023-02-07 NOTE — PROGRESS NOTES
02/07/23 0413 02/07/23 0415   Oxygen Therapy   O2 Sat (%) 100 % 99 %   Pulse via Oximetry 77 beats per minute 76 beats per minute   O2 Device Nasal cannula;Humidifier Nasal cannula;Humidifier   O2 Flow Rate (L/min) (S)  6 l/min  (weaned to 4lpm per protocol) (S)  4 l/min   Pre-Treatment   Breathing Pattern Regular  --    Respirations 16  --      Pt weaned from Mid-flow cannula to standard NC

## 2023-02-07 NOTE — DIABETES MGMT
[facility]  DIABETES MANAGEMENT CONSULT    Consulted by  Kristie Cote MD   for advanced nursing evaluation and care for inpatient blood glucose management. Evaluation and Action Plan   Eloisa August is a 71year old gentleman, with Type 2 Diabetes with a recent A1C of 7.7%, who is admitted with arrhythmias and acute on chronic HFrEF with failure to respond to inotrope support. He was discharged one week prior from a prolonged hospital stay for acute on chronic HF and LVAD work-up and discharged home on dobutamine with a lifevest.  Glucose control was tenuous during his previous admission s/t multiple co-morbidities, several tests/procedures requiring NPO status, waxing and waining oral intake. At this time glucose is impacted by:  Heart Failure with reduced EF 25-30%  Milrinone and Dobutamine therapy  TANNER: GFR improving: Now 44  Oral intake     Last admission, he required low basal doses but high bolus doses with meals to offset pre-prandial hyperglycemia. He is experiencing pre-prandial hyperglycemia despite moderate dose bolus humalog resumed- and dose increased to high dose. Individualized BG target is closer to 180mg/dl. He has been approved for LVAD implantation as destination therapy (Tentatively 2/15) and is currently undergoing SGC-guided optimization in CV-ICU     Management Rationale Action Plan   Medication   Basal needs Using 0.1 units/kg/D Lantus 6 units daily as fasting BG below goal      May switch to NPH dosed once daily if fasting BG remains below goal.   Nutritional needs Using resistant sensitivity based on degree of pre-prandial hyperglycemia Continue 6 units Humalog/meal (high dose).   Increased 2/2     Hold if patient is NPO or consumes less than 50% of carbohydrates on meal tray    Advance by 2 units daily for persistent   pre-prandial hyperglycemia   Corrective insulin Using normal sensitivity  Normal sensitivity ACHS     Additional orders  Diet per RD. Will defer to them for customization of diet with malnutrition. If BG remains at goal on basal/bolus insulin- would hold basal insulin the morning of LVAD implant (2/15) and start an insulin gtt the day of surgery per protocol. Initial Presentation   Lauren Reina is a 71 y.o. male who presented to the ED 1/26/23 with lower extremity swelling and difficulty breathing. He had a prolonged hospital admission from 12/22/22-1/19/23 for acute on chronic systolic HF and LVAD work-up. He was discharged with home inotrope. LAB: , GFR 58, Trop 2003, BNP 4524  CXR: New diffuse bilateral increased interstitial markings, partially obscuring bilateral pulmonary nodules. HX:   Past Medical History:   Diagnosis Date    CAD (coronary artery disease) 11/10/2016    NSTEMI & 2 stents    Deafness 10/28/2012    DM (diabetes mellitus) ([facility]     Elevated cholesterol     Hypertension     NSTEMI (non-ST elevated myocardial infarction) ([facility] 11/10/2016        INITIAL DX:   CHF (congestive heart failure) ([facility] [I50.9]     Current Treatment     TX: Milrinone, Amio. Specialty consultations: [facility], Cardiology, EP, GI     Hospital Course   Clinical progress has been complicated by:    0/54: Admission. Rapid response for SVT- Given adenosine x2, amio bolus/gtt  1/27: Advanced HFC consult. EP consult ordered. Intolerance of inotropic support. Cardiology consult. NSTEMI, heparin gtt started. Seen by EP: Continue amio. 1/28: Febrile: CT chest 1/28 shows diffuse focal opacities concerning for pneumonia. Obtain blood cultures, UA. Pathology report 1/18/23: well differentiated neuroendocrine tumor grade 1. EGD: Patchy erythema gastric body, biopsies done. 6 mm sessile polyp gastric body biopsied  1/30: Oncology consult: Recommend multiphase CT of the abdomen and pelvis to evaluate for metastatic spread. Tachycardia and SOB, BiPAP overnight. 2/3: Accepted for VAD implant if renal fx improves per [facility].   CCU Transfer and swan placed  : PRBC transfusion   : With increased dyspnea. Doubtamine started  Diabetes History   Type 2 Diabetes  Ambulatory BG management provided by: PCP Howie Conte MD    Diabetes-related Medical History  Acute complications  Acute hyperglycemia  Neurological complications  Peripheral neuropathy  Microvascular disease  Nephropathy  Macrovascular disease  CAD  Other associated conditions                                       CHF     Diabetes Medication History  Key Antihyperglycemic Medications        Diabetes Medication History  Key Antihyperglycemic Medications               metFORMIN (GLUCOPHAGE) 500 mg tablet (Taking) Take 1 Tablet by mouth two (2) times daily (with meals) for 30 days.     glipiZIDE (GLUCOTROL) 5 mg tablet (Taking) Take 1 tablet by mouth twice daily    Januvia 50 mg tablet (Taking) Take 1 tablet by mouth once daily             Diabetes self-management practices:   Eating pattern                Eats 3 small meals daily  [x]         Breakfast                         2 Eggs, Coffee  [x]         Lunch                               [place]  [x]         Dinner                              \" Food\" Bread, rice, lentils   [x]         Bedtime                           [place]  [x]         Snacks                              Afternoon snack  [x]         Beverages                        Water, Coffee  Physical activity pattern                Sedentary   Monitoring pattern  Discharged last week with a [place] CGM and serum glucometer  7 day average Ba-9a: 129-164  9a-12: 243  Noon to 9p: 198-238  1 low BG in the last week overnight    Taking medications pattern  [x]         Consistent administration  [x]         Affordable  Social determinants of health impacting diabetes self-management practices   Concerned that you need to know more about how to stay healthy with diabetes  Overall evaluation:                 [x]         Achieving A1c target with drug therapy & self-care practices    Subjective   \"I am getting ready to work with therapy\"     Objective   Physical exam  General Underweight [place] [facility] male in acute distress/ill-appearing. Neuro  Alert, oriented   Vital Signs Visit Vitals  BP (!) 121/53 (BP 1 Location: Left upper arm, BP Patient Position: At rest)   Pulse 84   Temp 97.9 °F (36.6 °C)   Resp 23   Ht 5' 9\" (1.753 m)   Wt 56.8 kg (125 lb 3.5 oz)   SpO2 99%   BMI 18.49 kg/m²     Skin  Warm and dry. No acanthosis noted along neckline. Heart   Regular rate and rhythm.  No murmurs, rubs or gallops  Lungs  Clear to auscultation without rales or rhonchi  Extremities No foot wounds        Laboratory  Recent Labs     23  0448 23  0327 23  0413   GLU 79 93 267*   AGAP 5 5 5   WBC 6.9 6.0 5.3   CREA 1.66* 1.68* 1.95*   AST 16 12* 11*   ALT 14 14 14         Factors impacting BG management  Factor Dose Comments   Nutrition:  Standard meals     60 grams/meal      Heart Failure/NSTEMI/Ischemic cardiomyopathy/Arrhythmias  Milrinone  BNP 4879  EF 25-30%    Infection UTI/PNA  Urine Cx pending   IV antibiotics   Fevers    Other:   Kidney function TANNER on CKD:   Current GFR 44 (improving)      Blood glucose pattern    Significant diabetes-related events over the past 24-72 hours  UA on admission: 100 glucose, yeast, moderate leuk esterase  A1C 7.7% 23  Fasting B/85/190  Pre-prandial: 176-219  Basal: Lantus 10 units  Bolus: 6 units Humalog/meal  Correction: 5 units in the last 24h  Eating ok- drinking supplements/milk       Assessment and Nursing Intervention   Nursing Diagnosis Risk for unstable blood glucose pattern   Nursing Intervention Domain 8720 Decision-making Support   Nursing Interventions Examined current inpatient diabetes/blood glucose control   Explored factors facilitating and impeding inpatient management  Explored corrective strategies with patient and responsible inpatient provider   Informed patient of rational for insulin strategy while hospitalized     Billing Code(s)   No charge    Before making these care recommendations, I personally reviewed the hospitalization record, including notes, laboratory & diagnostic data and current medications, and examined the patient at the bedside (circumstances permitting) before determining care. More than fifty (50) percent of the time was spent in patient counseling and/or care coordination.   Total minutes: 10    SLICK Valdes  Diabetes Clinical Nurse Specialist  [facility]  Access via [place]

## 2023-02-07 NOTE — PROGRESS NOTES
Problem: Self Care Deficits Care Plan (Adult)  Goal: *Acute Goals and Plan of Care (Insert Text)  Description:   FUNCTIONAL STATUS PRIOR TO ADMISSION: Patient required minimal assistance for basic and instrumental ADLs. Used rollator for functional mobility, had HHA and HH OT/PT upon discharge. HOME SUPPORT: The patient lived with wife and family members. Occupational Therapy Goals  Initiated 1/30/2023  1. Patient will perform grooming with supervision/set-up within 7 day(s). 2.  Patient will perform upper body dressing with supervision/set-up within 7 day(s). 3.  Patient will perform lower body dressing with supervision/set-up within 7 day(s). 4.  Patient will perform all aspects of toileting with supervision/set-up within 7 day(s). 5.  Patient will utilize energy conservation techniques during functional activities with verbal cues within 7 day(s). Outcome: Progressing Towards Goal   OCCUPATIONAL THERAPY TREATMENT  Patient: Eloisa August (76 y.o. male)  Date: 2/7/2023  Diagnosis: [facility]congest[facility] [I50.9] <principal problem not specified>      Precautions: Fall  Chart, occupational therapy assessment, plan of care, and goals were reviewed. ASSESSMENT  Patient continues with skilled OT services and is progressing towards goals. Patient received seated in chair, agreeable to OT session, on 4L O2 via NC. Session focusing on LB dressing with emphasis on moving within the tube in prep for LVAD workup. Patient assisted with bed mobility 2x to assist with transitioning to supine for de lining. Following de lining, patient agreeable to transition to chair and able to don socks/shoes with cloth laces requiring up to mod A. Tenderness noted on heel of RLE, RN aware and notified. Patient would likely benefit from further adaptive dressing in future sessions. Remained seated in recliner chair, all needs met at conclusion of session.      Patient is not verbalizing and is not demonstrating understanding of mindful-based movements (\"move in the tube\") principles of keeping UEs proximal to ribcage to prevent lateral pull on the sternum during load-bearing activities with visual, verbal, manual, and tactile cues required for compliance. Current Level of Function Impacting Discharge (ADLs): up to min A for LB ADLs, CGA - min A for transfers    Other factors to consider for discharge: possible LVAD workup, supportive family, inotropes     PLAN :  Patient continues to benefit from skilled intervention to address the above impairments. Continue treatment per established plan of care to address goals. Recommend with staff: up to chair daily, tennis shoes with activity for comfort    Recommend next OT session: UB dressing education - move in tube     Recommendation for discharge: (in order for the patient to meet his/her long term goals)  To be determined: pending possible LVAD workup     This discharge recommendation:  Has been made in collaboration with the attending provider and/or case management    IF patient discharges home will need the following DME: TBD       SUBJECTIVE:   Patient stated Sissy garcia for your help.     OBJECTIVE DATA SUMMARY:   Cognitive/Behavioral Status:   AOx4       Functional Mobility and Transfers for ADLs:  Bed Mobility:  Rolling: Independent  Supine to Sit: Supervision (cues for mindful mvmt/move in tube)  Sit to Supine: Supervision (cues for mindful mvmt/move in tube)    Transfers:  Sit to Stand: Stand-by assistance       Balance:  Sitting: Intact  Standing: Intact; With support    ADL Intervention:     Grooming  Grooming Assistance: Supervision  Position Performed: Seated in chair  Washing Face: Supervision       Lower Body Dressing Assistance  Socks: Supervision (via tailor sit)  Shoes with Cloth Laces:  Moderate assistance (would benefit from [facility], [facility] and elastic shoe laces)              Patient instructed and educated on mindful movement principles based on Move in The Tube concept to include maintaining bilateral elbows close to rib cage when performing any load-bearing activity such as getting in/out of bed, pushing up from a chair, opening a door, or lifting a box. Patient was given a handout with diagrams of each correct/incorrect method of performing each of the above tasks. Patient instructed on the ability to utilize upper extremities outside the tube when doing any non-load bearing activity such as washing hair/body, brushing teeth, retrieving clothing items, or scratching your back. Patient encouraged to also perform upper extremity exercises \"outside of the tube\" to prevent scar tissue formation around sternal incision site. Pain:  Pt did not endorse pain during session     Activity Tolerance:   Good and requires rest breaks    After treatment patient left in no apparent distress:   Sitting in chair and Call bell within reach    COMMUNICATION/COLLABORATION:   The patients plan of care was discussed with: Physical therapist, Occupational therapist, and Registered nurse.      Naty Dumont OT  Time Calculation: 30 mins

## 2023-02-07 NOTE — PROGRESS NOTES
Problem: Mobility Impaired (Adult and Pediatric)  Goal: *Acute Goals and Plan of Care (Insert Text)  Description: FUNCTIONAL STATUS PRIOR TO ADMISSION: Patient was modified independent using a rollator for functional mobility. Pt recently d/c home after previous hospital stay. Able to ambulate community distances. HOME SUPPORT PRIOR TO ADMISSION: The patient lived with spouse but did not require assist.    Physical Therapy Goals  Initiated 1/28/2023-- Goals appropriate and add #6 2/6/2023  1. Patient will move from supine to sit and sit to supine  in bed with modified independence within 7 day(s). 2.  Patient will transfer from bed to chair and chair to bed with modified independence using the least restrictive device within 7 day(s). 3.  Patient will perform sit to stand with modified independence within 7 day(s). 4.  Patient will ambulate with modified independence for 300 feet with the least restrictive device within 7 day(s). 5.  Patient will ascend/descend 12 stairs with 1 handrail(s) with supervision/set-up within 7 day(s). 6.  Patient will verbally and functionally recall move in the tube techniques in prep for possible LVAD within 7 days. Outcome: Progressing Towards Goal   PHYSICAL THERAPY TREATMENT  Patient: Ana Torres (76 y.o. male)  Date: 2/7/2023  Diagnosis: [facility]est[facility] [I50.9] <principal problem not specified>      Precautions: Fall  Chart, physical therapy assessment, plan of care and goals were reviewed. ASSESSMENT  Patient continues with skilled PT services and is progressing towards goals. Patient received sitting in chair, on 4L/min, and eager to mobilize. Participated in ambulation around CVSU and CVI without LOB or instability and wearing shoes today. VSS on 4L/min. No SOB or dizziness reported or observed.  Discussed importance of studying LVAD binder and how we can review what he has learned throughout therapy sesssions as a means to commit information to memory prior to surgery (tentative date 2/15 per patient). Also reviewed that we will begin practicing move in the tube for transfers and bed mobility in coming sessions and to review move in the tube handouts in binder. Overall progressing very well with his mobility on dual inotropic support and 4L/min. Current Level of Function Impacting Discharge (mobility/balance): SBA    Other factors to consider for discharge: 4L/min, dual inotropic support         PLAN :  Patient continues to benefit from skilled intervention to address the above impairments. Continue treatment per established plan of care. to address goals. Recommendation for discharge: (in order for the patient to meet his/her long term goals)  To be determined: following possible LVAD     This discharge recommendation:  Has been made in collaboration with the attending provider and/or case management    IF patient discharges home will need the following DME: to be determined (TBD)       SUBJECTIVE:   Patient stated I want to go into surgery feeling like this.     OBJECTIVE DATA SUMMARY:   Critical Behavior:  Neurologic State: Alert  Orientation Level: Oriented X4  Cognition: Appropriate decision making  Safety/Judgement: Awareness of environment  Functional Mobility Training:  Transfers:  Sit to Stand: Stand-by assistance  Stand to Sit: Stand-by assistance  Balance:  Sitting: Intact  Standing: Intact; With support  Ambulation/Gait Training:  Distance (ft): 500 Feet (ft)  Assistive Device: Gait belt;Walker, rollator (4L/min)  Ambulation - Level of Assistance: Stand-by assistance  Gait Abnormalities: Decreased step clearance  Base of Support: Narrowed  Speed/Bette: Pace decreased (<100 feet/min)  Step Length: Left shortened;Right shortened    Activity Tolerance:   Good and desaturates with exertion and requires oxygen    After treatment patient left in no apparent distress:   Sitting in chair and Call bell within reach    COMMUNICATION/COLLABORATION:   The patients plan of care was discussed with: Occupational therapist and Registered nurse.      Normie Barthel, PT, DPT   Time Calculation: 24 mins

## 2023-02-07 NOTE — PROGRESS NOTES
RENAL  PROGRESS NOTE        Subjective:    Feels better  Objective:   VITALS SIGNS:    Visit Vitals  BP (!) 121/53 (BP 1 Location: Left upper arm, BP Patient Position: At rest)   Pulse 84   Temp 97.9 °F (36.6 °C)   Resp 23   Ht 5' 9\" (1.753 m)   Wt 56.8 kg (125 lb 3.5 oz)   SpO2 99%   BMI 18.49 kg/m²       O2 Device: Nasal cannula, Humidifier   O2 Flow Rate (L/min): (S) 4 l/min   Temp (24hrs), Av.4 °F (36.9 °C), Min:97.5 °F (36.4 °C), Max:99.3 °F (37.4 °C)         PHYSICAL EXAM:  NAD  Some edema    DATA REVIEW:     INTAKE / OUTPUT:   Last shift:      No intake/output data recorded. Last 3 shifts:  190 -  0700  In: 1090.2 [P.O.:560; I.V.:530.2]  Out: 3900 [Urine:3900]    Intake/Output Summary (Last 24 hours) at 2023 0911  Last data filed at 2023 0700  Gross per 24 hour   Intake 694.76 ml   Output 3175 ml   Net -2480.24 ml           LABS:   Recent Labs     23   WBC 6.9 6.0 5.3   HGB 8.3* 8.7* 8.5*   HCT 28.4* 30.0* 29.5*    206 217       Recent Labs     23    139 134*   K 4.0 4.4 4.5    109* 106   CO2 26 25 23   GLU 79 93 267*   BUN 31* 31* 39*   CREA 1.66* 1.68* 1.95*   CA 8.9 8.9 8.9   MG 1.9 1.9 1.8   ALB 3.1* 3.1* 3.4*   TBILI 0.6 0.6 0.6   ALT 14 14 14           Assessment:  TANNER on CKD-3a: Suspect cardiorenal effects with needed diuresis. Hypotension/poor renal perfusion likely culprit. Recent baseline Cr has been in the low 1s. Hyperkalemia: better     Ischemic CM: Recurrent exacerbations. EF 20%. On continuous Milrinone since last admission.  Interested in LVAD-> sorting out candidacy     BPH: s/p donnelly     Well differentiated NET Grade 1: noted on EGD 23     Anemia 2 to CKD:Hgb sub-optimal     Hyponatremia: mild-> better     Left UE basilic and radial vein thrombus     Plan/Recommendations:  Creatinine slowly  better  Now in CVICU for [facility] placement and continued LVAD w/u BRIGHT (last dose 2/2/23)  Diuretics as per cardiology   Discussed with him  Valentin Campos MD

## 2023-02-07 NOTE — PROGRESS NOTES
CRITICAL CARE ADMISSION NOTE      Name: Lindy Vincent   : 1950   MRN: [id number]   Date: 2023      Reason for ICU Admission: Acute on chronic HFrEF, LVAD workup     ICU PROBLEM LIST   Acute on chronic HFrEF (20%) NYHA IIIb/IV (D) on home inotropic support, life vest  TANNER on CKD3  Aflutter w/ RVR  Acute on chronic hypoxemic respiratory failure  CAP  CAD  Gastric neuroendocrine tumor  Hx of LUE DVT  DM  PAD  TRISTAN  BPH w/ urinary retention requiring chronic donnelly    HISTORY OF PRESENT ILLNESS:   Pt is a 71 y.o M w/ PMH as noted above admitted to [facility] on  due to ongoing symptoms secondary to his HFrEF. Treated for possible CAP, and diuresed for acute on chronic HFrEF. Nephrology following for TANNER on CKD 3. AHF team recommended transfer to CVICU for [facility] placement and ongoing LVAD workup. 24 HOUR EVENTS:   No acute events overnight, sitting in bedside chair this AM. PAC removed per HF team. On dobutamine and milrinone gtt, good UOP. NEUROLOGICAL:    Mentation appropriate  Delirium precautions  Encourage work w/ PT/OT    PULMONOLOGY:   O2 per NC PRN for spO2 >92%    CARDIOVASCULAR:   Scheduled and PRN bumex  Goal net negative  C/w milrinone, dobutamine  Telemetry, close hemodynamic monitoring  Unable to tolerate BB, ARNI/ARB due to , aldactone held 2/2 elevated K   C/w ASA, amiodarone, statin  Will need Life Vest replaced  Ongoing LVAD workup by AHF team  Monitor for signs of hypoperfusion, monitor UOP    GASTROINTESTINAL:   PPI  Cardiac, renal, diabetic diet as tolerated      RENAL/ELECTROLYTE/FLUIDS:   Strict I/Os  Nephrology following  Monitor RFP  Mg/Phos/K >2/3/4  Donnelly to remain in place  PRN bumex    ENDOCRINE:   SSI, Basal insulin  Hypoglycemic protocol  Glucose goal 120-180    HEMATOLOGY/ONCOLOGY:   On eliquis  EPO weekly  Gastric neuroendocrine tumor, well differentiated, good prognosis per onc.    Not barrier to LVAD implant    ID/MICRO:   S/p CAP treatment     ICU DAILY CHECKLIST Code Status:Full  DVT Prophylaxis:Eliquis  T/L/D: PIV, PAC, Vasquez  SUP: PPI  Diet: ADAT  Activity Level:ad jose f  ABCDEF Bundle/Checklist Completed:Yes  Disposition: Stay in ICU, likely TTF in AM  Multidisciplinary Rounds Completed:  yes  Patient/Family Updated: Yes    Tatiana   2/3 Transferred to CVICU for [facility] placement    SUBJECTIVE:     As noted above    Review of Systems:     Review of Systems   Constitutional:  Positive for malaise/fatigue. Negative for chills and fever. HENT:  Negative for congestion and sinus pain. Eyes:  Negative for blurred vision, double vision and pain. Respiratory:  Positive for shortness of breath. Negative for hemoptysis and sputum production. Cardiovascular:  Negative for chest pain, palpitations and leg swelling. Gastrointestinal:  Negative for abdominal pain, nausea and vomiting. Genitourinary:  Negative for dysuria, frequency and urgency. Musculoskeletal:  Negative for back pain, joint pain and myalgias. Skin:  Negative for itching and rash. Neurological:  Negative for tremors, sensory change, speech change and focal weakness. Psychiatric/Behavioral:  Negative for hallucinations. The patient is not nervous/anxious.        OBJECTIVE:     Labs and Data: Reviewed 23  Medications: Reviewed 23  Imaging: Reviewed 23    General - chronically ill, sarcopenia, OOBTC  HEENT- pupils equal, EOMI, anicteric  Neuro - moves all extrem, follows basic commands, no focal deficit  CV - RRR, systolic murmur  Resp - rales b/l, NC  Abd - soft, nontender, no guarding  MSK- intact, no sacral ulcer      Visit Vitals  BP (!) 105/52   Pulse 83   Temp 99 °F (37.2 °C)   Resp 19   Ht 5' 9\" (1.753 m)   Wt 56.8 kg (125 lb 3.5 oz)   SpO2 100%   BMI 18.49 kg/m²    O2 Flow Rate (L/min): (S) 4 l/min O2 Device: Nasal cannula Temp (24hrs), Av.4 °F (36.9 °C), Min:97.4 °F (36.3 °C), Max:99.3 °F (37.4 °C)    CVP (mmHg): 6 mmHg (23 1000) Intake/Output:     Intake/Output Summary (Last 24 hours) at 2/7/2023 1728  Last data filed at 2/7/2023 0800  Gross per 24 hour   Intake 224 ml   Output 2550 ml   Net -2326 ml         Imaging    01/26/23    ECHO ADULT FOLLOW-UP OR LIMITED 01/27/2023 1/27/2023    Interpretation Summary    Left Ventricle: EF by visual approximation is 20%. Left ventricle is mildly dilated. Mitral Valve: Moderately thickened leaflet, at the anterior and posterior leaflets. Moderately calcified leaflet. Moderate regurgitation. Left Atrium: Left atrium is moderately dilated. Technical qualifiers: Echo study was technically difficult with poor endocardial visualization. Contrast used: Definity. Limited study, not all structures viewed    Signed by: Brett Edgar MD on 1/27/2023  4:39 PM       Pertinent imaging reviewed and interpreted as noted above    CRITICAL CARE DOCUMENTATION  I had a face to face encounter with the patient, reviewed and interpreted patient data including clinical events, labs, images, vital signs, I/O's, and examined patient. I have discussed the case and the plan and management of the patient's care with the consulting services, the bedside nurses and the respiratory therapist.      NOTE OF PERSONAL INVOLVEMENT IN CARE   This patient has a high probability of imminent, clinically significant deterioration, which requires the highest level of preparedness to intervene urgently. I participated in the decision-making and personally managed or directed the management of the following life and organ supporting interventions that required my frequent assessment to treat or prevent imminent deterioration. I personally spent 35 minutes of critical care time. This is time spent at this critically ill patient's bedside actively involved in patient care as well as the coordination of care. This does not include any procedural time which has been billed separately. Walker Alonso MD  Staff [address]

## 2023-02-07 NOTE — PROGRESS NOTES
[facility] in [place], [place]  Inpatient Progress Note      Patient name: Destiny Hernandez  Patient : 1950  Patient MRN: [id number]  Consulting MD: Mynor Green MD  Date of service: 23    REASON FOR REFERRAL:  Management of chronic systolic heart failure     PLAN OF CARE:  70 y/o male with likely combined non-ischemic and ischemic cardiomyopathy, LVEF 25-30%, stage D, NYHA class IIIB/IV; initiated on home milrinone gtt as bridge to completion of LVAD workup  Most likely etiology of cardiomyopathy: CAD +/- TRISTAN +/- inappropriate sinus tach +/- undergoing workup; not candidate for revascularization per Dr. 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   Patient was approved for LVAD implantation as destination therapy at [facility] 2/3/23 with tentative plans to proceed to OR on 2/15/23; currently undergoing SGC-guided optimization in CV-ICU   The following have been identified as relative concerns pertaining to LVAD candidacy: small body surface area, cachexia, BMI 18, malnutrition, frailty, advanced age, muscular deconditioning, PVD (fingertips), urinary retention requiring donnelly catheter, neuroendocrine tumor class 1 requiring treatment after LVAD, heal injury requiring wound care consult, concerns regarding neurocognitive dysfunction, chronic kidney disease and hearing loss without functioning hearing aid  Plan for family meeting this week with palliative care team,  and AHF team      RECOMMENDATIONS:  CATRINA post cath   Continue milrinone 0.375 mcg/kg/min; dose to current weight 127lbs   Continue Dobutamine 2mcg/kg/min  No beta-blockers due to hypotension and RV conditioning prior to LVAD  Cannot tolerate ARB/ARNi due to hypotension  Cannot tolerate spironolactone due to hyperkalemia  Cannot tolerate jardiance due to significant diuresis on smallest dose/contributed to IVVD  Discontinued corlanor due to SVT  Digoxin stopped d/t risk for toxicity with amio loading; monitoring level  Continue amiodarone, appreciate EP cardiology recs  Continue small dose of bumex daily   Appreciate nephrology recommendations   Continue Mag ox 400 mg daily   Keep K+ >4 and Mg >2  Continue eliquis 5mg twice daily; discontinue on 2/10 and convert to bivalrudin (will confirm with Dr. Lucius Benson)  S/p Venofer 200mg x 2 doses  Check Iron profile on 2/13  Transfuse to keep hgb closer to 8 for potential surgical procedure  Abx/treatment of suspected PNA per hospitalist  Continue lifevest  Continue baby aspirin   Plavix previously stopped, okayed by Dr. Shirlie Aase consult pending (high risk for TRISTAN)  Heel pressure ulcer- wound care consulted  VAD evaluation completed; VAD coordinator to provide education  Check labs in AM: prealbumin, procalcitonin, CRP-Q, ESR, folate, LDH, iron profile  Focus on physical therapy and ambulate daily  Incentive spirometry and wean oxygen NC  Nutritionist consultation   consultation and daily support; help with hearing aids  Schedule family meeting this week to discuss upcoming operation- tentative date 2/15/23     All other care per primary team      INTERVAL HISTORY:  Hemodynamics stable   Milrinone 0.375  Dobutamine started overnight 2mcg  Hgb stable  Cr down to 1.6  Net neg 2L      IMPRESSION:  Acute on chronic combined systolic/diastolic  heart failure  Stage D, NYHA class IIIB/IV symptoms   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, LVEF 25-30% and 23% (by cMRI 1/3/23)  Cardiac MRI suggestive of ischemic cardiomyopathy  PYP equivocal  Chronic milrinone infusion as palliation   Coronary artery disease  CAD s/p CABG x 2: further disease best managed medically due to small vessel size   At risk of sudden cardiac death  Peripheral arterial disease  Bilateral hydronephrosis s/p donnelly  Cardiac risk factors:  HTN  HL  TRISTAN, STOP-BANG 4  DM2  CKD, stage 3  MOCA from 1/4/22 17/30, consistent with mild cognitive impairment  Hard of hearing  Gastric Neuroendocrine Tumor, elevated gastrin and chromogranin A  Sepsis, unclear source         LIFE GOALS:  Lifestyle goals reviewed with the patient. Patient's personal goals include: having a few more years with family  Important upcoming milestones or family events: None at this time   The patient identifies the following as important for living well: being at home, not being SOB              CARDIAC IMAGING:  Echo 1/27/23    Left Ventricle: EF by visual approximation is 20%. Left ventricle is mildly dilated. Mitral Valve: Moderately thickened leaflet, at the anterior and posterior leaflets. Moderately calcified leaflet. Moderate regurgitation. Left Atrium: Left atrium is moderately dilated. Technical qualifiers: Echo study was technically difficult with poor endocardial visualization. Contrast used: Definity. Limited study, not all structures viewed     Echo 1/9/23   Left Ventricle: Severely reduced left ventricular systolic function with a visually estimated EF of 25 - 30%. Left ventricle size is normal. Mildly increased wall thickness. Echo 12/26/22    Left Ventricle: Severely reduced left ventricular systolic function with a visually estimated EF of 25 - 30%. Left ventricle size is normal. Normal wall thickness. There are regional wall motion abnormalities. Grade II diastolic dysfunction with increased LAP. Right Ventricle: Moderately reduced systolic function. TAPSE is abnormal. TAPSE is 1.1 cm.     Aortic Valve: Mild stenosis of the aortic valve. AV peak gradient is 13 mmHg. AV peak velocity is 1.8 m/s. Mitral Valve: Not well visualized. Moderate annular calcification at the posterior leaflet of the mitral valve. Mild to moderate regurgitation. Tricuspid Valve: Mildly elevated RVSP. Left Atrium: Left atrium is moderately dilated. 12/8/22    Left Ventricle: Moderately reduced left ventricular systolic function with a visually estimated EF of 35 - 40%. Severe hypokinesis of the following segments: mid anteroseptal, apical anterior, apical septal, apical inferior and apical lateral. Severe hypokinesis of the apex. Mitral Valve: Severely thickened leaflet, at the anterior and posterior leaflets. Severely calcified leaflet, at the anterior and posterior leaflets. Mild annular calcification of the mitral valve. Moderate regurgitation. Left Atrium: Left atrium is mildly dilated. Contrast used: Definity. limited study     EKG 12/22/22 ST, Biatria enlargement, marked ST abnormality     [facility] 12/6/22  1. Normal LVEDP  2. Severe native multivessel coronary artery disease  3. Patent LIMA to LAD and vein graft to distal RCA  4. Recurrent ISR in OM1 stent with now 60 to 70% restenosis  5. Recoil of left main and circumflex stent with now recurrent 40 to 50% stenosis. 6.  Progression of ostial left main disease now to about 60% stenosis  7. Progression of disease in jailed first marginal branch now with diffuse 90% stenosis  8. High-grade stenosis in the mid to distal right potential femoral artery treated with 6 x 40 mm impact drug-coated balloon angioplasty to reduce the stenosis to less than 40%     NST        HEMODYNAMICS:  RHC 1/9/23  PA 20/9, RA 3, PCWP 8, CI 1.8     CPEST too ill   6MW 300 feet        HPI:  71 y.o. male who was admitted via ED for worsening SOB, poor appetite, orthopnea and fatigue. Pmhx of CAD s/p CABG, PAD, DM 2, recent admission for HFmrEF earlier this month.  Serial TTEs show progressive decline in EF since 3/2021 with the most recent EF at 25-30%. Recent LHC shows diffuse CAD and no interventions indicated. Patient had Karen Campos recently and there is some thought to myocarditis or other etiology causing worsening HF. [facility] was consulted for further evaluation and management of HFrEF. REVIEW OF SYSTEMS:  Review of Systems   Constitutional:  Negative for chills, fever and malaise/fatigue. Respiratory:  Negative for cough. DONALDSON but improved    Cardiovascular:  Positive for leg swelling. Negative for chest pain and palpitations. Gastrointestinal:  Negative for nausea and vomiting. Musculoskeletal:  Negative for falls and myalgias. Neurological:  Negative for dizziness and headaches. Psychiatric/Behavioral:  Negative for depression. PHYSICAL EXAM:  Visit Vitals  BP (!) 121/53 (BP 1 Location: Left upper arm, BP Patient Position: At rest)   Pulse 84   Temp 97.9 °F (36.6 °C)   Resp 23   Ht 5' 9\" (1.753 m)   Wt 125 lb 3.5 oz (56.8 kg)   SpO2 99%   BMI 18.49 kg/m²     Physical Exam  Vitals and nursing note reviewed. Constitutional:       Appearance: Normal appearance. He is normal weight. Cardiovascular:      Rate and Rhythm: Normal rate and regular rhythm. Pulses: Normal pulses. Heart sounds: Normal heart sounds. Pulmonary:      Effort: No respiratory distress. Breath sounds: Normal breath sounds. Abdominal:      General: There is no distension. Palpations: Abdomen is soft. Musculoskeletal:         General: Swelling present. Skin:     General: Skin is warm and dry. Neurological:      General: No focal deficit present. Mental Status: He is alert and oriented to person, place, and time.    Psychiatric:         Mood and Affect: Mood normal.          PAST MEDICAL HISTORY:  Past Medical History:   Diagnosis Date    CAD (coronary artery disease) 11/10/2016    NSTEMI & 2 stents    Deafness 10/28/2012    DM (diabetes mellitus) ([facility]     Elevated cholesterol     Hypertension     NSTEMI (non-ST elevated myocardial infarction) [facility] 11/10/2016       PAST SURGICAL HISTORY:  Past Surgical History:   Procedure Laterality Date    COLONOSCOPY N/A 6/28/2018    COLONOSCOPY performed by Reinaldo Cota MD at [facility] ENDOSCOPY    COLONOSCOPY N/A 1/18/2023    COLONOSCOPY performed by Chris Robertson MD at [facility] ENDOSCOPY    [address]  11/11/2016    2 stents       FAMILY HISTORY:  Family History   Problem Relation Age of Onset    Heart Disease Father     Heart Attack Father     Hypertension Mother     Elevated Lipids Brother     Elevated Lipids Brother     No Known Problems Sister     Elevated Lipids Brother     No Known Problems Son     No Known Problems Daughter     Anesth Problems Neg Hx        SOCIAL HISTORY:  Social History     Socioeconomic History    Marital status:    Tobacco Use    Smoking status: Never     Passive exposure: Never    Smokeless tobacco: Never   Vaping Use    Vaping Use: Never used   Substance and Sexual Activity    Alcohol use: Yes     Alcohol/week: 2.0 standard drinks     Types: 1 Cans of beer, 1 Shots of liquor per week     Comment: rarely    Drug use: No    Sexual activity: Yes     Social Determinants of Health     Financial Resource Strain: Medium Risk    Difficulty of Paying Living Expenses: Somewhat hard   Food Insecurity: Food Insecurity Present    Worried About Running Out of Food in the Last Year: Never true    Ran Out of Food in the Last Year: Often true       LABORATORY RESULTS:     Labs Latest Ref Rng & Units 2/7/2023 2/6/2023 2/5/2023 2/4/2023 2/3/2023 2/2/2023 2/1/2023   WBC 4.1 - 11.1 K/uL 6.9 6.0 5.3 5.3 5.6 6.8 6.3   RBC 4.10 - 5.70 M/uL 3.37(L) 3.56(L) 3.46(L) 2.93(L) 3.00(L) 3.32(L) 3.27(L)   Hemoglobin 12.1 - 17.0 g/dL 8. 3(L) 8.7(L) 8.5(L) 7. 1(L) 7. 5(L) 8. 3(L) 8.0(L)   Hematocrit 36.6 - 50.3 % 28. 4(L) 30. 0(L) 29. 5(L) 25. 3(L) 25. 8(L) 28. 7(L) 27. 7(L)   MCV 80.0 - 99.0 FL 84.3 84.3 85.3 86.3 86.0 86.4 84.7   Platelets 029 - 673 K/uL 202 206 217 257 259 304 271   Lymphocytes 12 - 49 % 16 19 15 - 17 20 21   Monocytes 5 - 13 % 9 10 10 - 9 10 10   Eosinophils 0 - 7 % 1 2 1 - 1 2 3   Basophils 0 - 1 % 1 1 1 - 1 1 1   Albumin 3.5 - 5.0 g/dL 3. 1(L) 3. 1(L) 3.4(L) 2. 9(L) 3.0(L) - -   Calcium 8.5 - 10.1 MG/DL 8.9 8.9 8.9 8.6 9.1 9.1 9.0   Glucose 65 - 100 mg/dL 79 93 267(H) 140(H) 129(H) 142(H) 94   BUN 6 - 20 MG/DL 31(H) 31(H) 39(H) 36(H) 42(H) 40(H) 33(H)   Creatinine 0.70 - 1.30 MG/DL 1.66(H) 1.68(H) 1.95(H) 2.16(H) 2.12(H) 2.01(H) 2.08(H)   Sodium 136 - 145 mmol/L 137 139 134(L) 137 137 135(L) 136   Potassium 3.5 - 5.1 mmol/L 4.0 4.4 4.5 4.8 4.6 4.9 4.9   TSH 0.36 - 3.74 uIU/mL - - - - - - -   PSA 0.01 - 4.0 ng/mL - - - - - - -   LDH 85 - 241 U/L 401(H) - - - - - -   Some recent data might be hidden     Lab Results   Component Value Date/Time    TSH 3.52 01/28/2023 05:26 AM    TSH 2.12 12/27/2022 02:36 PM    TSH 4.80 (H) 12/06/2022 03:53 AM    TSH 5.39 (H) 10/12/2022 09:10 AM    TSH 3.53 02/03/2022 11:47 AM    TSH 5.790 (H) 11/21/2019 04:45 PM    TSH 3.08 06/22/2018 01:53 PM    TSH 4.250 05/26/2015 09:43 AM       ALLERGY:  No Known Allergies     CURRENT MEDICATIONS:    Current Facility-Administered Medications:     insulin glargine (LANTUS) injection 6 Units, 6 Units, SubCUTAneous, DAILY, Cathy Sheldon, SLICK    milrinone (PRIMACOR) 20 MG/100 ML D5W infusion, 0.375 mcg/kg/min, IntraVENous, CONTINUOUS, Adriana Broussard MD, Last Rate: 6.5 mL/hr at 02/07/23 0801, 0.375 mcg/kg/min at 02/07/23 0801    albuterol-ipratropium (DUO-NEB) 2.5 MG-0.5 MG/3 ML, 3 mL, Nebulization, Q4H PRN, Tammy MOLINA MD, 3 mL at 02/06/23 1539    bumetanide (BUMEX) tablet 1 mg, 1 mg, Oral, BID, Adriana Broussard MD, 1 mg at 02/06/23 1823    DOBUTamine (DOBUTREX) 500 mg/250 mL (2,000 mcg/mL) infusion, 2 mcg/kg/min, IntraVENous, CONTINUOUS, Adriana Broussard MD, Last Rate: 3.5 mL/hr at 02/07/23 0801, 2 mcg/kg/min at 02/07/23 0801    magnesium oxide (MAG-OX) tablet 400 mg, 400 mg, Oral, DAILY, Lizette Linton, NP, 400 mg at 02/06/23 0905    0.9% sodium chloride infusion, 6 mL/hr, IntraVENous, CONTINUOUS, Elisabet Bacon MD, Last Rate: 6 mL/hr at 02/06/23 0752, 6 mL/hr at 02/06/23 0752    0.9% sodium chloride infusion 250 mL, 250 mL, IntraVENous, PRN, Lizette Linton, NP    alteplase (CATHFLO) 1 mg in sterile water (preservative free) 1 mL injection, 1 mg, InterCATHeter, PRN, Lizette Linton, NP    bacitracin 500 unit/gram packet 1 Packet, 1 Packet, Topical, PRN, Lizette Linton, NP    bumetanide (BUMEX) injection 1 mg, 1 mg, IntraVENous, Q4H PRN, Lizette Linton NP, 1 mg at 02/06/23 1349    epoetin heidy-epbx (RETACRIT) injection 10,000 Units, 10,000 Units, SubCUTAneous, Q7D, Jadiel Galvan MD, 10,000 Units at 02/02/23 2213    senna-docusate (PERICOLACE) 8.6-50 mg per tablet 1 Tablet, 1 Tablet, Oral, DAILY PRN, Savi STALEY NP    insulin lispro (HUMALOG) injection 6 Units, 6 Units, SubCUTAneous, TID WITH MEALS, Cathy Sheldon CNS, 6 Units at 02/06/23 1904    traZODone (DESYREL) tablet 50 mg, 50 mg, Oral, QHS PRN, Narciso Delgado MD, 50 mg at 02/06/23 2108    [COMPLETED] apixaban (ELIQUIS) tablet 10 mg, 10 mg, Oral, BID, 10 mg at 02/06/23 0905 **FOLLOWED BY** apixaban (ELIQUIS) tablet 5 mg, 5 mg, Oral, BID, Narciso Delgado MD, 5 mg at 02/06/23 1823    glucose chewable tablet 16 g, 4 Tablet, Oral, PRN, Ita Harvey NP    glucagon (GLUCAGEN) injection 1 mg, 1 mg, IntraMUSCular, PRN, Ita Harvey NP    dextrose 10 % infusion 0-250 mL, 0-250 mL, IntraVENous, PRN, Ita Harvey NP    insulin lispro (HUMALOG) injection, , SubCUTAneous, AC&HS, Ita Harvey NP, 2 Units at 02/06/23 2103    balsam peru-castor oiL (VENELEX) ointment, , Topical, BID, Meghann Grant MD, Given at 02/06/23 1907    lidocaine 4 % patch 1 Patch, 1 Patch, TransDERmal, Q24H, Narciso Delgado MD, 1 Patch at 02/05/23 1132    amiodarone (CORDARONE) tablet 400 mg, 400 mg, Oral, BID, Ashley Roman MD, 400 mg at 02/06/23 1823    aspirin delayed-release tablet 81 mg, 81 mg, Oral, DAILY, Heber Barajas MD, 81 mg at 02/06/23 0905    sodium chloride (NS) flush 5-40 mL, 5-40 mL, IntraVENous, Q8H, Heber Barajas MD, 10 mL at 02/07/23 0732    sodium chloride (NS) flush 5-40 mL, 5-40 mL, IntraVENous, PRN, Heber Barajas MD    acetaminophen (TYLENOL) tablet 650 mg, 650 mg, Oral, Q6H PRN, 650 mg at 02/07/23 0214 **OR** acetaminophen (TYLENOL) suppository 650 mg, 650 mg, Rectal, Q6H PRN, Heber Barajas MD    polyethylene glycol (MIRALAX) packet 17 g, 17 g, Oral, DAILY PRN, Heber Barajas MD, 17 g at 02/02/23 1539    ondansetron (ZOFRAN ODT) tablet 4 mg, 4 mg, Oral, Q8H PRN, 4 mg at 01/30/23 1357 **OR** ondansetron (ZOFRAN) injection 4 mg, 4 mg, IntraVENous, Q6H PRN, Heber Barajas MD, 4 mg at 02/03/23 1926    pantoprazole (PROTONIX) tablet 40 mg, 40 mg, Oral, ACB, Heber Barajas MD, 40 mg at 02/07/23 0732    rosuvastatin (CRESTOR) tablet 20 mg, 20 mg, Oral, QHS, Heber Barajas MD, 20 mg at 02/06/23 2105    PATIENT CARE TEAM:  Patient Care Team:  Saul Bateman MD as PCP - General (Family Medicine)  Saul Bateman MD as PCP - [facility]  Sandra Elizabeth MD (Cardiovascular Disease Physician)  Enmanuel Patel MD (Gastroenterology)  Patricia Ruiz MD (Cardiothoracic Surgery)  Louise Rodriguez MD (Cardiovascular Disease Physician)  Ryan Arita MD (Nephrology)  Yissel Torres RN as Care Transitions Nurse  Petty Powell MD (Pulmonary Disease)  Elisabet Bacon MD ([address] [place] [address] Vascular Surgery)     Thank you for allowing me to participate in this patient's care. Total Critical Care time spent:    50 minutes.  There was no overlap with other services        Critical care was necessary to treat or prevent imminent or life threatening deterioration of the following conditions: cardiac failure, respiratory failure and CNS failure or compromise     Services Provided:  1. Telemetry review and 12 lead ECG interpretation  2. Hemodynamic interpretation, assessment, and management  3. Review and interpretation of CXR  4. Review and interpretation of lab values  5. Review and interpretation of microbiologic data and culture results  6. Review of medications and administration  7. Review and interpretation of nutrition requirements and management  8. Discussion of management withother consultants and services  9.  Clinical update to family members      Karina Ritchie NP   [address] [place] [address], [address]  Phone: [phone]

## 2023-02-08 ENCOUNTER — TELEPHONE (OUTPATIENT)
Dept: CARDIOLOGY CLINIC | Age: 73
End: 2023-02-08

## 2023-02-08 ENCOUNTER — PATIENT MESSAGE (OUTPATIENT)
Dept: SLEEP MEDICINE | Age: 73
End: 2023-02-08

## 2023-02-08 NOTE — PROGRESS NOTES
2000: Bedside shift change report given to Nayt MILAN RN/Marisol SHABAZZ RN (oncoming nurse) by Xiomara Cortez RN (offgoing nurse). Report included the following information SBAR, Intake/Output, MAR, Recent Results, Cardiac Rhythm NSR, and Alarm Parameters . 0800: Bedside shift change report given to Emani Plata RN (oncoming nurse) by Jd Burt RN/Marisol SHABAZZ RN (offgoing nurse). Report included the following information SBAR, Intake/Output, MAR, Recent Results, Cardiac Rhythm NSR, and Alarm Parameters .      Shift summary: uneventful shift, pt took PRN trazadone to assist with rest    I have reviewed and agree with Stephie Denis RN charting, assessment, and med admin

## 2023-02-08 NOTE — PROGRESS NOTES
Met with pt. And pt. Daughter. Pt. Reports doing well. SW observed pt. Is sitting up in chair and responds to open ended questions-appears to be hearing more clearly with back up hearing aide. SW provided phone number to pt. Daughter Emmanuelle Mcdonald and suggested family meeting for Friday. Pt. Daughter stated her brother would be present to meet with Dr. Dhaliwal Kidney Friday at 3 and would likely appreciate meeting the SW and the LVAD coordinator YANDEL Canas if available around this time.

## 2023-02-08 NOTE — PROGRESS NOTES
Problem: Self Care Deficits Care Plan (Adult)  Goal: *Acute Goals and Plan of Care (Insert Text)  Description:   FUNCTIONAL STATUS PRIOR TO ADMISSION: Patient required minimal assistance for basic and instrumental ADLs. Used rollator for functional mobility, had HHA and HH OT/PT upon discharge. HOME SUPPORT: The patient lived with wife and family members. Occupational Therapy Goals  Initiated 1/30/2023  1. Patient will perform grooming with supervision/set-up within 7 day(s). 2.  Patient will perform upper body dressing with supervision/set-up within 7 day(s). 3.  Patient will perform lower body dressing with supervision/set-up within 7 day(s). 4.  Patient will perform all aspects of toileting with supervision/set-up within 7 day(s). 5.  Patient will utilize energy conservation techniques during functional activities with verbal cues within 7 day(s). Outcome: Progressing Towards Goal   OCCUPATIONAL THERAPY TREATMENT  Patient: Pollo Muro (76 y.o. male)  Date: 2/8/2023  Diagnosis: CHF (congestive heart failure) ([facility] [I50.9] <principal problem not specified>      Precautions: Fall  Chart, occupational therapy assessment, plan of care, and goals were reviewed. ASSESSMENT  Patient continues with skilled OT services and is progressing towards goals. Patient received OOB in chair, amenable to session. Reviewed potential sternal precautions and education if patient received LVAD implant. Completed UE cardiac exercises with no rest breaks needed. Educated on modifying ADL to maintain precautions, will trial [facility] and elastic laces next session to improve LE dressing independence. Left seated in chair, all needs in reach, NAD. Current Level of Function Impacting Discharge (ADLs): x1 assist all ADL/mobility    Other factors to consider for discharge: below baseline, LVAD workup         PLAN :  Patient continues to benefit from skilled intervention to address the above impairments.   Continue treatment per established plan of care to address goals. Recommend with staff: OOB 3x daily for meals, functional mobility to bathroom    Recommend next OT session: OOB ADL, LE dressing with [facility] and amilcar    Recommendation for discharge: (in order for the patient to meet his/her long term goals)  Occupational therapy at least 2 days/week in the home AND ensure assist and/or supervision for safety with ADL    This discharge recommendation:  Has not yet been discussed the attending provider and/or case management    IF patient discharges home will need the following DME: patient owns DME required for discharge       SUBJECTIVE:   Patient stated I think I was weak making my balance bad.     OBJECTIVE DATA SUMMARY:   Cognitive/Behavioral Status:           Perception: Appears intact  Perseveration: No perseveration noted  Safety/Judgement: Awareness of environment; Fall prevention; Insight into deficits    Functional Mobility and Transfers for ADLs:  Bed Mobility:       Transfers:  Sit to Stand: Contact guard assistance;Stand-by assistance          Balance:  Sitting: Intact  Standing: Intact; With support    ADL Intervention:                 Type of Bath: Chlorhexidine (CHG); Full              Lower Body Dressing Assistance  Socks: Supervision  Shoes with Cloth Laces: Minimum assistance  Leg Crossed Method Used: Yes  Position Performed: Seated in chair  Cues: Verbal cues provided         Cognitive Retraining  Safety/Judgement: Awareness of environment; Fall prevention; Insight into deficits    Therapeutic Exercises:   Cardiac UE exercises in standing    Pain:  None reported    Activity Tolerance:   Fair and requires rest breaks    After treatment patient left in no apparent distress:   Sitting in chair and Call bell within reach    COMMUNICATION/COLLABORATION:   The patients plan of care was discussed with: Physical therapist and Registered nurse.      Chicho Day OT  Time Calculation: 25 mins

## 2023-02-08 NOTE — PROGRESS NOTES
0800: Assumed care of pt and assessment  1000: back to bed and swan removed. Then up with PT  1500: To bed for a nap.  1700 OOB to BSC  1930:Bedside and Verbal shift change report given to EPIFANIO SANDOVAL and Marisol SANDOVAL  (oncoming nurse) by Sherry Corral RN (offgoing nurse). Report included the following information Procedure Summary, Intake/Output, MAR, Med Rec Status, and Cardiac Rhythm NSR  .

## 2023-02-08 NOTE — DIABETES MGMT
[facility]  DIABETES MANAGEMENT CONSULT    Consulted by  Nettie Mosley MD   for advanced nursing evaluation and care for inpatient blood glucose management. Evaluation and Action Plan   Nader Clark is a 71year old gentleman, with Type 2 Diabetes with a recent A1C of 7.7%, who is admitted with arrhythmias and acute on chronic HFrEF with failure to respond to inotrope support. He was discharged one week prior from a prolonged hospital stay for acute on chronic HF and LVAD work-up and discharged home on dobutamine with a lifevest.  Glucose control was tenuous during his previous admission s/t multiple co-morbidities, several tests/procedures requiring NPO status, waxing and waining oral intake. At this time glucose is impacted by:  Heart Failure with reduced EF 25-30%  Milrinone and Dobutamine therapy  TANNER: GFR improving: Now 44  Oral intake     Last admission, he required low basal doses but high bolus doses with meals to offset pre-prandial hyperglycemia. He is experiencing pre-prandial hyperglycemia despite moderate dose bolus humalog resumed- and dose increased to high dose. Individualized BG target is closer to 180mg/dl. He has been approved for LVAD implantation as destination therapy (Tentatively 2/15) and is currently undergoing SGC-guided optimization in CV-ICU     Management Rationale Action Plan   Medication   Basal needs Using 0.1 units/kg/D Lantus 6 units daily as fasting BG below goal      May switch to NPH dosed once daily if fasting BG remains below goal.   Nutritional needs Using resistant sensitivity based on degree of pre-prandial hyperglycemia Increased to 8 units Humalog/meal (high dose).   Increased 2/8     Hold if patient is NPO or consumes less than 50% of carbohydrates on meal tray    Advance by 2 units daily for persistent   pre-prandial hyperglycemia   Corrective insulin Using normal sensitivity  Normal sensitivity ACHS     Additional orders  Diet per RD. Will defer to them for customization of diet with malnutrition. If BG remains at goal on basal/bolus insulin- would hold basal insulin the morning of LVAD implant (2/15) and start an insulin gtt the day of surgery per protocol. Initial Presentation   Tobias Campos is a 71 y.o. male who presented to the ED 1/26/23 with lower extremity swelling and difficulty breathing. He had a prolonged hospital admission from 12/22/22-1/19/23 for acute on chronic systolic HF and LVAD work-up. He was discharged with home inotrope. LAB: , GFR 58, Trop 2003, BNP 4524  CXR: New diffuse bilateral increased interstitial markings, partially obscuring bilateral pulmonary nodules. HX:   Past Medical History:   Diagnosis Date    CAD (coronary artery disease) 11/10/2016    NSTEMI & 2 stents    Deafness 10/28/2012    DM (diabetes mellitus) ([facility]     Elevated cholesterol     Hypertension     NSTEMI (non-ST elevated myocardial infarction) ([facility] 11/10/2016        INITIAL DX:   CHF (congestive heart failure) ([facility] [address] [I50.9]     Current Treatment     TX: Milrinone, Amio. Specialty consultations: [facility], Cardiology, EP, GI     Hospital Course   Clinical progress has been complicated by:    1/04: Admission. Rapid response for SVT- Given adenosine x2, amio bolus/gtt  1/27: Advanced HFC consult. EP consult ordered. Intolerance of inotropic support. Cardiology consult. NSTEMI, heparin gtt started. Seen by EP: Continue amio. 1/28: Febrile: CT chest 1/28 shows diffuse focal opacities concerning for pneumonia. Obtain blood cultures, UA. Pathology report 1/18/23: well differentiated neuroendocrine tumor grade 1. EGD: Patchy erythema gastric body, biopsies done. 6 mm sessile polyp gastric body biopsied  1/30: Oncology consult: Recommend multiphase CT of the abdomen and pelvis to evaluate for metastatic spread. Tachycardia and SOB, BiPAP overnight. 2/3:  Accepted for VAD implant if renal fx improves per [facility].  CCU Transfer and swan placed  /: PRBC transfusion   : With increased dyspnea. Doubtamine started  Diabetes History   Type 2 Diabetes  Ambulatory BG management provided by: PCP Chad Hill MD    Diabetes-related Medical History  Acute complications  Acute hyperglycemia  Neurological complications  Peripheral neuropathy  Microvascular disease  Nephropathy  Macrovascular disease  CAD  Other associated conditions                                       CHF     Diabetes Medication History  Key Antihyperglycemic Medications        Diabetes Medication History  Key Antihyperglycemic Medications               metFORMIN (GLUCOPHAGE) 500 mg tablet (Taking) Take 1 Tablet by mouth two (2) times daily (with meals) for 30 days.     glipiZIDE (GLUCOTROL) 5 mg tablet (Taking) Take 1 tablet by mouth twice daily    Januvia 50 mg tablet (Taking) Take 1 tablet by mouth once daily             Diabetes self-management practices:   Eating pattern                Eats 3 small meals daily  [x]         Breakfast                         2 Eggs, Coffee  [x]         Lunch                               [place]  [x]         Dinner                              \"[employer]\" Bread, rice, lentils   [x]         Bedtime                           [place]  [x]         Snacks                              Afternoon snack  [x]         Beverages                        Water, Coffee  Physical activity pattern                Sedentary   Monitoring pattern  Discharged last week with a [place] CGM and serum glucometer  7 day average Ba-9a: 129-164  9a-12: 243  Noon to 9p: 198-238  1 low BG in the last week overnight    Taking medications pattern  [x]         Consistent administration  [x]         Affordable  Social determinants of health impacting diabetes self-management practices   Concerned that you need to know more about how to stay healthy with diabetes  Overall evaluation:                 [x]         Achieving A1c target with drug therapy & self-care practices    Subjective   \"I am getting ready to work with therapy\"     Objective   Physical exam  General Underweight [place] ([facility]) male in acute distress/ill-appearing. Neuro  Alert, oriented   Vital Signs Visit Vitals  /60   Pulse 84   Temp 97.4 °F (36.3 °C)   Resp 21   Ht 5' 6\" (1.676 m)   Wt 55.3 kg (121 lb 14.6 oz)   SpO2 90%   BMI 19.68 kg/m²     Skin  Warm and dry. No acanthosis noted along neckline. Heart   Regular rate and rhythm.  No murmurs, rubs or gallops  Lungs  Clear to auscultation without rales or rhonchi  Extremities No foot wounds        Laboratory  Recent Labs     23  0815 23  0507 23  0448 23  0327   * 124* 79 93   AGAP 8 9 5 5   WBC  --  6.5 6.9 6.0   CREA 1.54* 1.60* 1.66* 1.68*   AST  --  28 16 12*   ALT  --  21 14 14         Factors impacting BG management  Factor Dose Comments   Nutrition:  Standard meals     60 grams/meal      Heart Failure/NSTEMI/Ischemic cardiomyopathy/Arrhythmias  Milrinone  BNP 4879  EF 25-30%    Infection UTI/PNA  Urine Cx pending   IV antibiotics   Fevers    Other:   Kidney function TANNER on CKD:   Current GFR 44 (improving)      Blood glucose pattern    Significant diabetes-related events over the past 24-72 hours  A1C 7.7% 23  Fasting B/84/85/190  Pre-prandial: 148-308  Basal: Lantus 6 units daily  Bolus: 6 units Humalog/meal  Correction: 5 units in the last 24h  Eating ok- drinking supplements/milk       Assessment and Nursing Intervention   Nursing Diagnosis Risk for unstable blood glucose pattern   Nursing Intervention Domain 2869 Decision-making Support   Nursing Interventions Examined current inpatient diabetes/blood glucose control   Explored factors facilitating and impeding inpatient management  Explored corrective strategies with patient and responsible inpatient provider   Informed patient of rational for insulin strategy while hospitalized     Billing Code(s)   No charge    Before making these care recommendations, I personally reviewed the hospitalization record, including notes, laboratory & diagnostic data and current medications, and examined the patient at the bedside (circumstances permitting) before determining care. More than fifty (50) percent of the time was spent in patient counseling and/or care coordination.   Total minutes: 10    Frankey Marcus, CNS  Diabetes Clinical Nurse Specialist  [facility]  Access via [employer]

## 2023-02-08 NOTE — PROGRESS NOTES
[facility] in [place], [place]  Inpatient Progress Note      Patient name: Lauren Reina  Patient : 1950  Patient MRN: [id number]  Consulting MD: Cara Ruiz MD  Date of service: 23    REASON FOR REFERRAL:  Management of chronic systolic heart failure     PLAN OF CARE:  70 y/o male with likely combined non-ischemic and ischemic cardiomyopathy, LVEF 25-30%, stage D, NYHA class IIIB/IV; initiated on home milrinone gtt as bridge to completion of LVAD workup  Most likely etiology of cardiomyopathy: CAD +/- TRISTAN +/- inappropriate sinus tach +/- undergoing workup; not candidate for revascularization per Dr. 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   Patient was approved for LVAD implantation as destination therapy at [facility] 2/3/23 with tentative plans to proceed to OR on 2/15/23; currently undergoing SGC-guided optimization in CV-ICU   The following have been identified as relative concerns pertaining to LVAD candidacy: small body surface area, cachexia, BMI 18, malnutrition, frailty, advanced age, muscular deconditioning, PVD (fingertips), urinary retention requiring donnelly catheter, neuroendocrine tumor class 1 requiring treatment after LVAD, heal injury requiring wound care consult, concerns regarding neurocognitive dysfunction, chronic kidney disease and hearing loss without functioning hearing aid  Plan for family meeting this week with palliative care team,  and AHF team      RECOMMENDATIONS:  Continue milrinone 0.375 mcg/kg/min;   Continue Dobutamine 2mcg/kg/min  No beta-blockers due to hypotension and RV conditioning prior to LVAD  Cannot tolerate ARB/ARNi due to hypotension  Cannot tolerate spironolactone due to hyperkalemia  Cannot tolerate jardiance due to significant diuresis on smallest dose/contributed to IVVD  Discontinued corlanor due to SVT  Digoxin stopped d/t risk for toxicity with amio loading; monitoring level  Continue amiodarone, appreciate EP cardiology recs  Continue small dose of bumex daily   Appreciate nephrology recommendations   Continue Mag ox 400 mg daily   Keep K+ >4 and Mg >2  Continue eliquis 5mg twice daily- will stop Eliquis on Friday 2/10.  No bridging per Dr. Brittany Elizondo   S/p Venofer 200mg x 2 doses  Check Iron profile on 2/13  Transfuse to keep hgb closer to 8 for potential surgical procedure  Abx/treatment of suspected PNA per hospitalist  Continue lifevest  Continue baby aspirin   Plavix previously stopped, okayed by Dr. Daniel Avelar consult pending (high risk for TRISTAN)  Heel pressure ulcer- wound care consulted  VAD coordinator to provide education  Focus on physical therapy and ambulate daily  Incentive spirometry and wean oxygen NC  Nutritionist consultation   consultation and daily support; help with hearing aids  Schedule family meeting this week to discuss upcoming operation- tentative date 2/15/23  No Vit K per Dr. Brittany Elizondo  Will place lines in OR  Will need blood products and antibiotics ordered on Monday 2/13  Will have consents signed on Monday     All other care per primary team      INTERVAL HISTORY:  Hemodynamics stable   Milrinone 0.375  Dobutamine 2mcg  Hgb stable  Cr down to 1.54  Net neg 2L  States he feels much better       IMPRESSION:  Acute on chronic combined systolic/diastolic  heart failure  Stage D, NYHA class IIIB/IV symptoms   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, LVEF 25-30% and 23% (by cMRI 1/3/23)  Cardiac MRI suggestive of ischemic cardiomyopathy  PYP equivocal  Chronic milrinone infusion as palliation   Coronary artery disease  CAD s/p CABG x 2: further disease best managed medically due to small vessel size   At risk of sudden cardiac death  Peripheral arterial disease  Bilateral hydronephrosis s/p andrzej  Cardiac risk factors:  HTN  HL  TRISTAN, STOP-BANG 4  DM2  CKD, stage 3  MOCA from 1/4/22 17/30, consistent with mild cognitive impairment  Hard of hearing  Gastric Neuroendocrine Tumor, elevated gastrin and chromogranin A  Sepsis, unclear source         LIFE GOALS:  Lifestyle goals reviewed with the patient. Patient's personal goals include: having a few more years with family  Important upcoming milestones or family events: None at this time   The patient identifies the following as important for living well: being at home, not being SOB              CARDIAC IMAGING:  Echo 1/27/23    Left Ventricle: EF by visual approximation is 20%. Left ventricle is mildly dilated. Mitral Valve: Moderately thickened leaflet, at the anterior and posterior leaflets. Moderately calcified leaflet. Moderate regurgitation. Left Atrium: Left atrium is moderately dilated. Technical qualifiers: Echo study was technically difficult with poor endocardial visualization. Contrast used: Definity. Limited study, not all structures viewed     Echo 1/9/23   Left Ventricle: Severely reduced left ventricular systolic function with a visually estimated EF of 25 - 30%. Left ventricle size is normal. Mildly increased wall thickness. Echo 12/26/22    Left Ventricle: Severely reduced left ventricular systolic function with a visually estimated EF of 25 - 30%. Left ventricle size is normal. Normal wall thickness. There are regional wall motion abnormalities. Grade II diastolic dysfunction with increased LAP. Right Ventricle: Moderately reduced systolic function. TAPSE is abnormal. TAPSE is 1.1 cm.     Aortic Valve: Mild stenosis of the aortic valve. AV peak gradient is 13 mmHg. AV peak velocity is 1.8 m/s. Mitral Valve: Not well visualized. Moderate annular calcification at the posterior leaflet of the mitral valve. Mild to moderate regurgitation. Tricuspid Valve: Mildly elevated RVSP. Left Atrium: Left atrium is moderately dilated. 12/8/22    Left Ventricle: Moderately reduced left ventricular systolic function with a visually estimated EF of 35 - 40%. Severe hypokinesis of the following segments: mid anteroseptal, apical anterior, apical septal, apical inferior and apical lateral. Severe hypokinesis of the apex. Mitral Valve: Severely thickened leaflet, at the anterior and posterior leaflets. Severely calcified leaflet, at the anterior and posterior leaflets. Mild annular calcification of the mitral valve. Moderate regurgitation. Left Atrium: Left atrium is mildly dilated. Contrast used: Definity. limited study     EKG 12/22/22 ST, Biatria enlargement, marked ST abnormality     [facility] 12/6/22  1. Normal LVEDP  2. Severe native multivessel coronary artery disease  3. Patent LIMA to LAD and vein graft to distal RCA  4. Recurrent ISR in OM1 stent with now 60 to 70% restenosis  5. Recoil of left main and circumflex stent with now recurrent 40 to 50% stenosis. 6.  Progression of ostial left main disease now to about 60% stenosis  7. Progression of disease in jailed first marginal branch now with diffuse 90% stenosis  8. High-grade stenosis in the mid to distal right potential femoral artery treated with 6 x 40 mm impact drug-coated balloon angioplasty to reduce the stenosis to less than 40%     NST        HEMODYNAMICS:  [facility] 1/9/23  PA 20/9, RA 3, PCWP 8, CI 1.8     CPEST too ill   6MW 300 feet        HPI:  71 y.o. male who was admitted via ED for worsening SOB, poor appetite, orthopnea and fatigue. Pmhx of CAD s/p CABG, PAD, DM 2, recent admission for HFmrEF earlier this month.  Serial TTEs show progressive decline in EF since 3/2021 with the most recent EF at 25-30%. Recent LHC shows diffuse CAD and no interventions indicated. Patient had Radha Fernando recently and there is some thought to myocarditis or other etiology causing worsening HF. [facility] for [facility] and management of HFrEF. REVIEW OF SYSTEMS:  Review of Systems   Constitutional:  Negative for chills, fever and malaise/fatigue. Respiratory:  Negative for cough. DONALDSON but improved    Cardiovascular:  Positive for leg swelling. Negative for chest pain and palpitations. Gastrointestinal:  Negative for nausea and vomiting. Musculoskeletal:  Negative for falls and myalgias. Neurological:  Negative for dizziness and headaches. Psychiatric/Behavioral:  Negative for depression. PHYSICAL EXAM:  Visit Vitals  BP (!) 107/55 (BP 1 Location: Left upper arm, BP Patient Position: At rest)   Pulse 79   Temp 97.4 °F (36.3 °C)   Resp 16   Ht 5' 9\" (1.753 m)   Wt 121 lb 14.6 oz (55.3 kg)   SpO2 98%   BMI 18.00 kg/m²     Physical Exam  Vitals and nursing note reviewed. Constitutional:       Appearance: Normal appearance. He is normal weight. Cardiovascular:      Rate and Rhythm: Normal rate and regular rhythm. Pulses: Normal pulses. Heart sounds: Normal heart sounds. Pulmonary:      Effort: No respiratory distress. Breath sounds: Normal breath sounds. Abdominal:      General: There is no distension. Palpations: Abdomen is soft. Musculoskeletal:         General: Swelling present. Skin:     General: Skin is warm and dry. Neurological:      General: No focal deficit present. Mental Status: He is alert and oriented to person, place, and time.    Psychiatric:         Mood and Affect: Mood normal.          PAST MEDICAL HISTORY:  Past Medical History:   Diagnosis Date    CAD (coronary artery disease) 11/10/2016    NSTEMI & 2 stents    Deafness 10/28/2012    DM (diabetes mellitus) (HCC)     Elevated cholesterol     Hypertension     NSTEMI (non-ST elevated myocardial infarction) ([facility] [address] 11/10/2016       PAST SURGICAL HISTORY:  Past Surgical History:   Procedure Laterality Date    COLONOSCOPY N/A 6/28/2018    COLONOSCOPY performed by Kit Sandifer, MD at [facility] ENDOSCOPY    COLONOSCOPY N/A 1/18/2023    COLONOSCOPY performed by Derrick Uribe MD at [facility] ENDOSCOPY    [address]  11/11/2016    2 stents       FAMILY HISTORY:  Family History   Problem Relation Age of Onset    Heart Disease Father     Heart Attack Father     Hypertension Mother     Elevated Lipids Brother     Elevated Lipids Brother     No Known Problems Sister     Elevated Lipids Brother     No Known Problems Son     No Known Problems Daughter     Anesth Problems Neg Hx        SOCIAL HISTORY:  Social History     Socioeconomic History    Marital status:    Tobacco Use    Smoking status: Never     Passive exposure: Never    Smokeless tobacco: Never   Vaping Use    Vaping Use: Never used   Substance and Sexual Activity    Alcohol use: Yes     Alcohol/week: 2.0 standard drinks     Types: 1 Cans of beer, 1 Shots of liquor per week     Comment: rarely    Drug use: No    Sexual activity: Yes     Social Determinants of Health     Financial Resource Strain: Medium Risk    Difficulty of Paying Living Expenses: Somewhat hard   Food Insecurity: Food Insecurity Present    Worried About Running Out of Food in the Last Year: Never true    Ran Out of Food in the Last Year: Often true       LABORATORY RESULTS:     Labs Latest Ref Rng & Units 2/8/2023 2/8/2023 2/7/2023 2/6/2023 2/5/2023 2/4/2023 2/3/2023   WBC 4.1 - 11.1 K/uL - 6.5 6.9 6.0 5.3 5.3 5.6   RBC 4.10 - 5.70 M/uL - 3.52(L) 3.37(L) 3.56(L) 3.46(L) 2.93(L) 3.00(L)   Hemoglobin 12.1 - 17.0 g/dL - 8. 9(L) 8. 3(L) 8.7(L) 8.5(L) 7. 1(L) 7. 5(L)   Hematocrit 36.6 - 50.3 % - 29. 4(L) 28. 4(L) 30. 0(L) 29. 5(L) 25. 3(L) 25. 8(L)   MCV 80.0 - 99.0 FL - 83.5 84.3 84.3 85.3 86.3 86.0   Platelets 679 - 229 K/uL - 193 202 206 217 257 259   Lymphocytes 12 - 49 % - 14 16 19 15 - 17   Monocytes 5 - 13 % - 9 9 10 10 - 9   Eosinophils 0 - 7 % - 3 1 2 1 - 1   Basophils 0 - 1 % - 1 1 1 1 - 1   Albumin 3.5 - 5.0 g/dL - 3. 1(L) 3. 1(L) 3. 1(L) 3.4(L) 2. 9(L) 3.0(L)   Calcium 8.5 - 10.1 MG/DL 9.3 9.1 8.9 8.9 8.9 8.6 9.1   Glucose 65 - 100 mg/dL 129(H) 124(H) 79 93 267(H) 140(H) 129(H)   BUN 6 - 20 MG/DL 31(H) 32(H) 31(H) 31(H) 39(H) 36(H) 42(H)   Creatinine 0.70 - 1.30 MG/DL 1.54(H) 1.60(H) 1.66(H) 1.68(H) 1.95(H) 2.16(H) 2.12(H)   Sodium 136 - 145 mmol/L 136 136 137 139 134(L) 137 137   Potassium 3.5 - 5.1 mmol/L 4.0 4.1 4.0 4.4 4.5 4.8 4.6   TSH 0.36 - 3.74 uIU/mL - - - - - - -   PSA 0.01 - 4.0 ng/mL - - - - - - -   LDH 85 - 241 U/L - - 401(H) - - - -   Some recent data might be hidden     Lab Results   Component Value Date/Time    TSH 3.52 01/28/2023 05:26 AM    TSH 2.12 12/27/2022 02:36 PM    TSH 4.80 (H) 12/06/2022 03:53 AM    TSH 5.39 (H) 10/12/2022 09:10 AM    TSH 3.53 02/03/2022 11:47 AM    TSH 5.790 (H) 11/21/2019 04:45 PM    TSH 3.08 06/22/2018 01:53 PM    TSH 4.250 05/26/2015 09:43 AM       ALLERGY:  No Known Allergies     CURRENT MEDICATIONS:    Current Facility-Administered Medications:     insulin glargine (LANTUS) injection 6 Units, 6 Units, SubCUTAneous, DAILY, Cathy Sheldon, CNS, 6 Units at 02/08/23 0847    milrinone (PRIMACOR) 20 MG/100 ML D5W infusion, 0.375 mcg/kg/min, IntraVENous, CONTINUOUS, Maggie Mueller MD, Last Rate: 6.5 mL/hr at 02/08/23 0828, 0.375 mcg/kg/min at 02/08/23 0828    albuterol-ipratropium (DUO-NEB) 2.5 MG-0.5 MG/3 ML, 3 mL, Nebulization, Q4H PRN, Ramos MOLINA MD, 3 mL at 02/06/23 1539    bumetanide (BUMEX) tablet 1 mg, 1 mg, Oral, BID, Maggie Mueller MD, 1 mg at 02/08/23 0842    DOBUTamine (DOBUTREX) 500 mg/250 mL (2,000 mcg/mL) infusion, 2 mcg/kg/min, IntraVENous, CONTINUOUS, Maggie Mueller MD, Last Rate: 3.5 mL/hr at 02/08/23 0828, 2 mcg/kg/min at 02/08/23 0828    magnesium oxide (MAG-OX) tablet 400 mg, 400 mg, Oral, DAILY, Shaila, Lizette B, NP, 400 mg at 02/08/23 0842    0.9% sodium chloride infusion, 6 mL/hr, IntraVENous, CONTINUOUS, Jo Jaffe MD, Last Rate: 3 mL/hr at 02/07/23 2030, 3 mL/hr at 02/07/23 2030    0.9% sodium chloride infusion 250 mL, 250 mL, IntraVENous, PRN, Shaila Lizette B, NP    alteplase (CATHFLO) 1 mg in sterile water (preservative free) 1 mL injection, 1 mg, InterCATHeter, PRN, Lizette Linton, NP    bacitracin 500 unit/gram packet 1 Packet, 1 Packet, Topical, PRN, Shaila Lizette B, NP    bumetanide (BUMEX) injection 1 mg, 1 mg, IntraVENous, Q4H PRN, Lizette Linton NP, 1 mg at 02/06/23 1349    epoetin heidy-epbx (RETACRIT) injection 10,000 Units, 10,000 Units, SubCUTAneous, Q7D, Jadiel Galvan MD, 10,000 Units at 02/02/23 2213    senna-docusate (PERICOLACE) 8.6-50 mg per tablet 1 Tablet, 1 Tablet, Oral, DAILY PRN, Brian STALEY NP    insulin lispro (HUMALOG) injection 6 Units, 6 Units, SubCUTAneous, TID WITH MEALS, Cathy Sheldon, CNS, 6 Units at 02/08/23 0847    traZODone (DESYREL) tablet 50 mg, 50 mg, Oral, QHS PRN, Key Bedoya MD, 50 mg at 02/07/23 2258    [COMPLETED] apixaban (ELIQUIS) tablet 10 mg, 10 mg, Oral, BID, 10 mg at 02/06/23 0905 **FOLLOWED BY** apixaban (ELIQUIS) tablet 5 mg, 5 mg, Oral, BID, Key Bedoya MD, 5 mg at 02/08/23 0842    glucose chewable tablet 16 g, 4 Tablet, Oral, PRN, Abernathie, Ita, NP    glucagon (GLUCAGEN) injection 1 mg, 1 mg, IntraMUSCular, PRN, Ita Harvey NP    dextrose 10 % infusion 0-250 mL, 0-250 mL, IntraVENous, PRN, Ita Harvey NP    insulin lispro (HUMALOG) injection, , SubCUTAneous, AC&HS, Ita Harvey NP, 2 Units at 02/07/23 1839    balsam peru-castor oiL (VENELEX) ointment, , Topical, BID, Fallon Alcantara MD, Given at 02/07/23 2304    lidocaine 4 % patch 1 Patch, 1 Patch, TransDERmal, Q24H, Key Bedoya MD, 1 Patch at 02/07/23 1543    amiodarone (CORDARONE) tablet 400 mg, 400 mg, Oral, BID, Margaret Montoya MD, 400 mg at 02/08/23 4645    aspirin delayed-release tablet 81 mg, 81 mg, Oral, DAILY, Heber Barajas MD, 81 mg at 02/08/23 0842    sodium chloride (NS) flush 5-40 mL, 5-40 mL, IntraVENous, Q8H, Heber Barajas MD, 10 mL at 02/07/23 2303    sodium chloride (NS) flush 5-40 mL, 5-40 mL, IntraVENous, PRN, Heber Barajas MD    acetaminophen (TYLENOL) tablet 650 mg, 650 mg, Oral, Q6H PRN, 650 mg at 02/07/23 0214 **OR** acetaminophen (TYLENOL) suppository 650 mg, 650 mg, Rectal, Q6H PRN, Heber Barajas MD    polyethylene glycol (MIRALAX) packet 17 g, 17 g, Oral, DAILY PRN, Heber Barajas MD, 17 g at 02/02/23 1539    ondansetron (ZOFRAN ODT) tablet 4 mg, 4 mg, Oral, Q8H PRN, 4 mg at 01/30/23 1357 **OR** ondansetron (ZOFRAN) injection 4 mg, 4 mg, IntraVENous, Q6H PRN, Heber Barajas MD, 4 mg at 02/03/23 1926    pantoprazole (PROTONIX) tablet 40 mg, 40 mg, Oral, ACB, Heber Barajas MD, 40 mg at 02/08/23 0703    rosuvastatin (CRESTOR) tablet 20 mg, 20 mg, Oral, QHS, Heber Barajas MD, 20 mg at 02/07/23 2258    PATIENT CARE TEAM:  Patient Care Team:  Tarun Kong MD as PCP - General (Family Medicine)  Tarun Kong MD as PCP - [facility]  Ana Rosa gN MD (Cardiovascular Disease Physician)  Suresh Thomson MD (Gastroenterology)  Levar Braga MD (Cardiothoracic Surgery)  Ramila Ballard MD (Cardiovascular Disease Physician)  Jaimie Hardy MD (Nephrology)  Sirena Gutierrez RN as Care Transitions Nurse  Ruth Ann Lu MD (Pulmonary Disease)  Lorena Rodriguez MD ([address] [place] [address] Vascular Surgery)     Thank you for allowing me to participate in this patient's care. Total Critical Care time spent:    40 minutes.  There was no overlap with other services        Critical care was necessary to treat or prevent imminent or life threatening deterioration of the following conditions: cardiac failure, respiratory failure and CNS failure or compromise     Services Provided:  1. Telemetry review and 12 lead ECG interpretation  2. Hemodynamic interpretation, assessment, and management  3. Review and interpretation of CXR  4. Review and interpretation of lab values  5. Review and interpretation of microbiologic data and culture results  6. Review of medications and administration  7. Review and interpretation of nutrition requirements and management  8. Discussion of management withother consultants and services  9.  Clinical update to family members      Xin Younger NP   [address] [place] [address], [address]  Phone: [phone]

## 2023-02-08 NOTE — PROGRESS NOTES
RENAL  PROGRESS NOTE        Subjective:    Feels ok  Objective:   VITALS SIGNS:    Visit Vitals  BP (!) 107/55   Pulse 79   Temp 96.8 °F (36 °C)   Resp 16   Ht 5' 9\" (1.753 m)   Wt 55.3 kg (121 lb 14.6 oz)   SpO2 98%   BMI 18.00 kg/m²       O2 Device: Nasal cannula   O2 Flow Rate (L/min): (S) 2 l/min   Temp (24hrs), Av.9 °F (36.6 °C), Min:96.8 °F (36 °C), Max:99 °F (37.2 °C)         PHYSICAL EXAM:  NAD  Some edema    DATA REVIEW:     INTAKE / OUTPUT:   Last shift:      No intake/output data recorded. Last 3 shifts:  190 -  0700  In: 1684.5 [P.O.:1140; I.V.:544.5]  Out: 6050 [Urine:6050]    Intake/Output Summary (Last 24 hours) at 2023 0832  Last data filed at 2023 0700  Gross per 24 hour   Intake 1252.5 ml   Output 4150 ml   Net -2897.5 ml           LABS:   Recent Labs     23  0507 23  0448 23  0327   WBC 6.5 6.9 6.0   HGB 8.9* 8.3* 8.7*   HCT 29.4* 28.4* 30.0*    202 206       Recent Labs     23  0507 23  0448 23  0327   NA  --  137 139   K  --  4.0 4.4   CL  --  106 109*   CO2  --  26 25   GLU  --  79 93   BUN  --  31* 31*   CREA  --  1.66* 1.68*   CA  --  8.9 8.9   MG 2.1 1.9 1.9   ALB  --  3.1* 3.1*   TBILI  --  0.6 0.6   ALT  --  14 14           Assessment:  TANNER on CKD-3a: Suspect cardiorenal effects with needed diuresis. Hypotension/poor renal perfusion likely culprit. Recent baseline Cr has been in the low 1s. Hyperkalemia: better     Ischemic CM: Recurrent exacerbations. EF 20%. On continuous Milrinone since last admission.  Interested in LVAD-> sorting out candidacy     BPH: s/p donnelly     Well differentiated NET Grade 1: noted on EGD 23     Anemia 2 to CKD:Hgb sub-optimal     Hyponatremia: mild-> better     Left UE basilic and radial vein thrombus     Plan/Recommendations:  Creatinine slowly  better as of yesterday  Now in CVICU [facility] placement and continued LVAD w/u    CMP  BRIGHT (last dose 23)  Diuretics as per cardiology Discussed with him  Sourav Beaver MD

## 2023-02-08 NOTE — PROGRESS NOTES
[facility] in [place], [place]       Met with patient, patient's wife and patient's daughter for LVAD education. Reviewed the following:     LVAD terms and functions- discussed each component of the LVAD system. -all verbalized understanding. - discussed each component of the HM3  display, lighted symbols and their meanings, and function of each button. - -all verbalized understanding. Alarms- Reviewed \"red heart\" hazard and \"yellow wrench\" advisory alarms. Discussed the importance of calling 911 and subsequently the LVAD 24/7 emergency patient in the event of the \"red heart\" hazard alarm with loss of illuminated green arrows. Discussed contacting LVAD team for \"yellow wrench\" alarms. - Reviewed with daughter and wife, who verbalized understanding. Sterile dressing change- Discussed driveline sterile dressing change. Reviewed driveline dressing change hand out. Discussed need for dressing change daily for the first 30 days and 3 times a week after, unless otherwise indicated by LVAD team. Informed family dressing change education should be set up by family with inpatient nurses for daily education. Explained at least on family member needs to be fully checked off on dressing change prior to patient discharge and encouraged them to set up education with inpatient RN as soon as possible after implant once patient stable. -  -all verbalized understanding. Power Sources- Discussed MPU and batteries. Educated patient and family that patient to use MPU at night when sleeping and any other time there is a chance of sleep. Discussed changing MPU batteries with daylight savings time. Discussed batteries and battery charger. Reviewed indicator lights on battery charger. Demonstrated inserting batteries into clips and inserting power leads into clips. Patient, daughter and wife returned demonstration.  Patient required assistance. Back Up Emergency Equipment- Reviewed LVAD back up emergency bag and contents (Extra set of batteries, battery clips and back up ). Discussed bag accompanying patient at all times to ensure back up equipment available in case of emergency, even when patient is in the hospital.-  -all verbalized understanding. Continued Heart Failure Care- Educated patient and family on the importance of continued heart healthy lifestyle including heart healthy diet, sodium monitoring, daily weight monitoring, taking medications as prescribed, and following up as recommended with health care providers. Patient, daughter and wife handled HM3 equipment. Educated wife and daughter they will need to arrange dressing change education with the inpatient nurses to get checked off and  will likely need several sessions. Patient's daughter stated that she and her  would like to be trained on equipment and dressing changes. Time given to ask questions. Patient and family had no further questions at this time. Informed them will reach out to set up additional training. They verbalized understanding and had no further questions.      Karyna Ferraro RN  VAD Coordinator

## 2023-02-08 NOTE — PROGRESS NOTES
Problem: Mobility Impaired (Adult and Pediatric)  Goal: *Acute Goals and Plan of Care (Insert Text)  Description: FUNCTIONAL STATUS PRIOR TO ADMISSION: Patient was modified independent using a rollator for functional mobility. Pt recently d/c home after previous hospital stay. Able to ambulate community distances. HOME SUPPORT PRIOR TO ADMISSION: The patient lived with spouse but did not require assist.    Physical Therapy Goals  Initiated 1/28/2023-- Goals appropriate and add #6 2/6/2023  1. Patient will move from supine to sit and sit to supine  in bed with modified independence within 7 day(s). 2.  Patient will transfer from bed to chair and chair to bed with modified independence using the least restrictive device within 7 day(s). 3.  Patient will perform sit to stand with modified independence within 7 day(s). 4.  Patient will ambulate with modified independence for 300 feet with the least restrictive device within 7 day(s). 5.  Patient will ascend/descend 12 stairs with 1 handrail(s) with supervision/set-up within 7 day(s). 6.  Patient will verbally and functionally recall move in the tube techniques in prep for possible LVAD within 7 days. Outcome: Progressing Towards Goal   PHYSICAL THERAPY TREATMENT  Patient: Anival Trimble (76 y.o. male)  Date: 2/8/2023  Diagnosis: CHF (congestive heart failure) ([facility] [I50.9] <principal problem not specified>      Precautions: Fall  Chart, physical therapy assessment, plan of care and goals were reviewed. ASSESSMENT  Patient continues with skilled PT services and is progressing towards goals. Patient received sitting in chair, agreeable to therapy and eager to mobilize. Able to gerald his own socks and shoes. Reviewed move in the tube and able to participate in transfers with move in the tube and min cues. Ambulated with intact balance with rollator and was able to progress to trial without device (one mild LOB without device and able to self correct). Participated in some LVAD review throughout session in prep for upcoming surgery. Current Level of Function Impacting Discharge (mobility/balance): CGA-SBA    Other factors to consider for discharge: upcoming surgery         PLAN :  Patient continues to benefit from skilled intervention to address the above impairments. Continue treatment per established plan of care. to address goals. Recommendation for discharge: (in order for the patient to meet his/her long term goals)  To be determined: post LVAD     This discharge recommendation:  Has been made in collaboration with the attending provider and/or case management    IF patient discharges home will need the following DME: to be determined (TBD)       SUBJECTIVE:   Patient stated I was wondering this morning why I can't walk without the walker.     OBJECTIVE DATA SUMMARY:   Critical Behavior:  Neurologic State: Alert  Orientation Level: Oriented X4  Cognition: Appropriate decision making  Safety/Judgement: Awareness of environment  Functional Mobility Training:  Transfers:  Sit to Stand: Contact guard assistance;Stand-by assistance  Stand to Sit: Contact guard assistance;Stand-by assistance  Balance:  Sitting: Intact  Standing: Intact; With support  Ambulation/Gait Training:  Distance (ft): 400 Feet (ft)  Assistive Device: Gait belt;Walker, rolling (2L/min)  Ambulation - Level of Assistance: Stand-by assistance;Contact guard assistance  Gait Abnormalities: Decreased step clearance  Base of Support: Narrowed  Speed/Bette: Pace decreased (<100 feet/min)  Step Length: Left shortened;Right shortened  LVAD review:  Battery life  Sleeping on power module  Terminology: driveline  Move in the tube  Reports he is a kinetic learner   Activity Tolerance:   Good    After treatment patient left in no apparent distress:   Sitting in chair and Call bell within reach    COMMUNICATION/COLLABORATION:   The patients plan of care was discussed with: Occupational therapist and Registered nurse.      Robel Jerry, PT, DPT   Time Calculation: 23 mins

## 2023-02-08 NOTE — TELEPHONE ENCOUNTER
Contacted palliative care nurse, Ben Pritchett after meeting with pt. And wife yesterday at bedside. SW completed LVAD workup paperwork and discussed family support with pt. And wife. Pt. Wife also expressed concern abt. Details of the advanced directive. SW referred to palliative nurse and requested a follow up discussion with the pt. And wife.

## 2023-02-09 ENCOUNTER — TELEPHONE (OUTPATIENT)
Dept: CARDIOLOGY CLINIC | Age: 73
End: 2023-02-09

## 2023-02-09 NOTE — PROGRESS NOTES
RENAL  PROGRESS NOTE        Subjective:    Feels same  Objective:   VITALS SIGNS:    Visit Vitals  BP (!) 106/50 (BP 1 Location: Left upper arm, BP Patient Position: At rest)   Pulse 76   Temp 97.7 °F (36.5 °C)   Resp 22   Ht 5' 6\" (1.676 m) Comment: per wife   Wt 55 kg (121 lb 4.1 oz)   SpO2 100%   BMI 19.57 kg/m²       O2 Device: Nasal cannula, Humidifier   O2 Flow Rate (L/min): 2 l/min   Temp (24hrs), Av.8 °F (36.6 °C), Min:97.3 °F (36.3 °C), Max:98.3 °F (36.8 °C)         PHYSICAL EXAM:  NAD  Some edema    DATA REVIEW:     INTAKE / OUTPUT:   Last shift:      701 - 1900  In: 0   Out: 100 [Urine:100]  Last 3 shifts:  190 -  07  In: 551.5 [I.V.:551.5]  Out: 6100 [Urine:6100]    Intake/Output Summary (Last 24 hours) at 2023 0818  Last data filed at 2023 0800  Gross per 24 hour   Intake 299 ml   Output 3550 ml   Net -3251 ml           LABS:   Recent Labs     23  0352 23  0507 23  0448   WBC 5.7 6.5 6.9   HGB 8.9* 8.9* 8.3*   HCT 30.4* 29.4* 28.4*    193 202       Recent Labs     23  0352 23  0815 23  0507 23  0448   * 136 134*  136 137   K 4.4 4.0 4.2  4.1 4.0   CL 98 102 100  102 106   CO2 27 26 29  25 26   * 129* 129*  124* 79   BUN 31* 31* 31*  32* 31*   CREA 1.55* 1.54* 1.55*  1.60* 1.66*   CA 9.0 9.3 8.9  9.1 8.9   MG 2.2  --  2.1 1.9   PHOS 2.6 3.3  --   --    ALB 2.8*  --  3.1*  3.1* 3.1*   TBILI 0.6  --  0.6  0.6 0.6   ALT 16  --  20  21 14   INR 1.4*  --   --   --            Assessment:  TANNER on CKD-3a: Suspect cardiorenal effects with needed diuresis. Hypotension/poor renal perfusion likely culprit. Recent baseline Cr has been in the low 1s. Hyperkalemia: better     Ischemic CM: Recurrent exacerbations. EF 20%. On continuous Milrinone since last admission.  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

## 2023-02-09 NOTE — PROGRESS NOTES
0800 Bedside shift change report [facility] nurse) by Master Foley RN (offgoing nurse). Report included the following information SBAR, Kardex, ED Summary, Procedure Summary, Intake/Output, MAR, Accordion, Recent Results, and Cardiac Rhythm NSR .    0945 Vitals OK for q4h per NP Lizette. Continue to encourage ambulation and mobility. Patient ambulated x2 with therapy. Appetite good with food brought by family members. Otherwise uneventful shift. 1930 Bedside shift change report given to [address] [place] [address] (oncoming nurse) by Malinda Villanueva RN (offgoing nurse). Report included the following information SBAR, Kardex, ED Summary, Procedure Summary, Intake/Output, MAR, Accordion, Recent Results, and Cardiac Rhythm NSR .

## 2023-02-09 NOTE — TELEPHONE ENCOUNTER
I'll need Letter of Medical  Necessity, caregiver contract, etc.  for this pending authorization #[id number] Thanks .

## 2023-02-09 NOTE — PROGRESS NOTES
Kumar Cognitive Assessment [facility]):    Patient scored 19/30. The patient scored lower in areas of visuospatial/executive function, language, and delayed recall. Confounding factor(s) include: age, impaired hearing (hearing aids working during session and with patient). Patient with improved score from prior assessment however still continues to present with mild cognitive impairment. The Kumar Cognitive Assessment [facility]) is designed to assess cognition in areas of visuospatial, executive, naming, memory, attention, language, abstraction, delayed recall, and orientation. Score of greater than or equal to 26/30 is considered normal cognition. Score of less than 26 indicates mild cognitive impairment. Score of 16 or lower indicates severe cognitive impairment. Quiana Cabrera I., Zen Basurto., PAULINA Ralph. (2005). The [facility] Cognitive Assessment, MoCA: A brief screening tool for mild cognitive impairment. Journal of the [facility], 47, 992-327. Patient evaluated with [place] Cognitive Assessment [facility]) to assess cognition in areas of visuospatial, executive, naming, memory, attention, language, abstraction, delayed recall, orientation. Patient scored 19/30, indicating mild cognitive impairment. Normal score is greater than or equal to 26/30. Patient with low score in the areas of visuospatial/executive. language and delayed recall. Confounding factor(s): age, cueing to take his time.

## 2023-02-09 NOTE — DIABETES MGMT
[facility]  DIABETES MANAGEMENT CONSULT    Consulted by  Dmitriy Crowder MD   for advanced nursing evaluation and care for inpatient blood glucose management. Evaluation and Action Plan   Jem Dillard is a 71year old gentleman, with Type 2 Diabetes with a recent A1C of 7.7%, who is admitted with arrhythmias and acute on chronic HFrEF with failure to respond to inotrope support. He was discharged one week prior from a prolonged hospital stay for acute on chronic HF and LVAD work-up and discharged home on dobutamine with a lifevest.  Glucose control was tenuous during his previous admission s/t multiple co-morbidities, several tests/procedures requiring NPO status, waxing and waining oral intake. At this time glucose is impacted by:  Heart Failure with reduced EF 25-30%  Milrinone and Dobutamine therapy  TANNER: GFR improving: Now 48  Oral intake     Last admission, he required low basal doses but high bolus doses with meals to offset pre-prandial hyperglycemia. He is experiencing pre-prandial hyperglycemia despite moderate dose bolus humalog resumed- and dose increased to high dose. Individualized BG target is closer to 180mg/dl. He has been approved for LVAD implantation as destination therapy (Tentatively 2/15) and is currently undergoing SGC-guided optimization in CV-ICU     Management Rationale Action Plan   Medication   Basal needs Using 0.1 units/kg/D Lantus 6 units daily as fasting BG below goal      May switch to NPH dosed once daily if fasting BG remains below goal.   Nutritional needs Using resistant sensitivity based on degree of pre-prandial hyperglycemia Increased to 8 units Humalog/meal (high dose).   Increased 2/8     Hold if patient is NPO or consumes less than 50% of carbohydrates on meal tray    Advance by 2 units daily for persistent   pre-prandial hyperglycemia   Corrective insulin Using normal sensitivity  Normal sensitivity ACHS     Additional orders  Diet per RD. Will defer to them for customization of diet with malnutrition. If BG remains at goal on basal/bolus insulin- would hold basal insulin the morning of LVAD implant (2/15) and start an insulin gtt the day of surgery per protocol. Initial Presentation   Tobias Campos is a 71 y.o. male who presented to the ED 1/26/23 with lower extremity swelling and difficulty breathing. He had a prolonged hospital admission from 12/22/22-1/19/23 for acute on chronic systolic HF and LVAD work-up. He was discharged with home inotrope. LAB: , GFR 58, Trop 2003, BNP 4524  CXR: New diffuse bilateral increased interstitial markings, partially obscuring bilateral pulmonary nodules. HX:   Past Medical History:   Diagnosis Date    CAD (coronary artery disease) 11/10/2016    NSTEMI & 2 stents    Deafness 10/28/2012    DM (diabetes mellitus) ([facility]     Elevated cholesterol     Hypertension     NSTEMI (non-ST elevated myocardial infarction) ([facility] 11/10/2016        INITIAL DX:   CHF (congestive heart failure) ([facility] [I50.9]     Current Treatment     TX: Milrinone, Amio. Specialty consultations: [facility], Cardiology, EP, GI     Hospital Course   Clinical progress has been complicated by:    3/34: Admission. Rapid response for SVT- Given adenosine x2, amio bolus/gtt  1/27: Advanced HFC consult. EP consult ordered. Intolerance of inotropic support. Cardiology consult. NSTEMI, heparin gtt started. Seen by EP: Continue amio. 1/28: Febrile: CT chest 1/28 shows diffuse focal opacities concerning for pneumonia. Obtain blood cultures, UA. Pathology report 1/18/23: well differentiated neuroendocrine tumor grade 1. EGD: Patchy erythema gastric body, biopsies done. 6 mm sessile polyp gastric body biopsied  1/30: Oncology consult: Recommend multiphase CT of the abdomen and pelvis to evaluate for metastatic spread. Tachycardia and SOB, BiPAP overnight. 2/3:  Accepted for VAD implant if renal fx improves per [facility].  CCU Transfer and swan placed  /: PRBC transfusion   26: With increased dyspnea. Doubtamine started  Diabetes History   Type 2 Diabetes  Ambulatory BG management provided by: PCP Irina lAcantara MD    Diabetes-related Medical History  Acute complications  Acute hyperglycemia  Neurological complications  Peripheral neuropathy  Microvascular disease  Nephropathy  Macrovascular disease  CAD  Other associated conditions                                       CHF     Diabetes Medication History  Key Antihyperglycemic Medications        Diabetes Medication History  Key Antihyperglycemic Medications               metFORMIN (GLUCOPHAGE) 500 mg tablet (Taking) Take 1 Tablet by mouth two (2) times daily (with meals) for 30 days.     glipiZIDE (GLUCOTROL) 5 mg tablet (Taking) Take 1 tablet by mouth twice daily    Januvia 50 mg tablet (Taking) Take 1 tablet by mouth once daily             Diabetes self-management practices:   Eating pattern                Eats 3 small meals daily  [x]         Breakfast                         2 Eggs, Coffee  [x]         Lunch                               Margaret  [x]         Dinner                              \" Food\" Bread, rice, lentils   [x]         Bedtime                           COokie  [x]         Snacks                              Afternoon snack  [x]         Beverages                        Water, Coffee  Physical activity pattern                Sedentary   Monitoring pattern  Discharged last week with a Carolynn CGM and serum glucometer  7 day average Ba-9a: 129-164  9a-12: 243  Noon to 9p: 198-238  1 low BG in the last week overnight    Taking medications pattern  [x]         Consistent administration  [x]         Affordable  Social determinants of health impacting diabetes self-management practices   Concerned that you need to know more about how to stay healthy with diabetes  Overall evaluation:                 [x]         Achieving A1c target with drug therapy & self-care practices    Subjective   \"I am getting ready to work with therapy\"     Objective   Physical exam  General Underweight [place] [facility]) male in acute distress/ill-appearing. Neuro  Alert, oriented   Vital Signs Visit Vitals  BP (!) 111/58 (BP 1 Location: Left upper arm, BP Patient Position: At rest)   Pulse 87   Temp 97.3 °F (36.3 °C)   Resp 23   Ht 5' 6\" (1.676 m)   Wt 55 kg (121 lb 4.1 oz)   SpO2 95%   BMI 19.57 kg/m²     Skin  Warm and dry. No acanthosis noted along neckline. Heart   Regular rate and rhythm.  No murmurs, rubs or gallops  Lungs  Clear to auscultation without rales or rhonchi  Extremities No foot wounds        Laboratory  Recent Labs     23  0352 23  0815 23  0507 23  0448   * 129* 129*  124* 79   AGAP 8 8 5  9 5   WBC 5.7  --  6.5 6.9   CREA 1.55* 1.54* 1.55*  1.60* 1.66*   AST 18  --  22  28 16   ALT 16  --  20  21 14         Factors impacting BG management  Factor Dose Comments   Nutrition:  Standard meals     60 grams/meal      Heart Failure/NSTEMI/Ischemic cardiomyopathy/Arrhythmias  Milrinone  BNP 4879  EF 25-30%    Infection UTI/PNA  Urine Cx pending   IV antibiotics   Fevers    Other:   Kidney function TANNER on CKD:   Current GFR 44 (improving)      Blood glucose pattern    Significant diabetes-related events over the past 24-72 hours  A1C 7.7% 23  Fasting B/118/84/85/190  Pre-prandial: 148-308  Basal: Lantus 6 units daily  Bolus: 8 units Humalog/meal  Correction: 5 units in the last 24h  Eating well- drinking supplements/milk       Assessment and Nursing Intervention   Nursing Diagnosis Risk for unstable blood glucose pattern   Nursing Intervention Domain 5253 Decision-making Support   Nursing Interventions Examined current inpatient diabetes/blood glucose control   Explored factors facilitating and impeding inpatient management  Explored corrective strategies with patient and responsible inpatient provider   Informed patient of rational for insulin strategy while hospitalized     Billing Code(s)   No charge    Before making these care recommendations, I personally reviewed the hospitalization record, including notes, laboratory & diagnostic data and current medications, and examined the patient at the bedside (circumstances permitting) before determining care. More than fifty (50) percent of the time was spent in patient counseling and/or care coordination.   Total minutes: 10    SLICK Peters  Diabetes Clinical Nurse Specialist  [facility]  Access via [employer]

## 2023-02-09 NOTE — PROGRESS NOTES
Problem: Mobility Impaired (Adult and Pediatric)  Goal: *Acute Goals and Plan of Care (Insert Text)  Description: FUNCTIONAL STATUS PRIOR TO ADMISSION: Patient was modified independent using a rollator for functional mobility. Pt recently d/c home after previous hospital stay. Able to ambulate community distances. HOME SUPPORT PRIOR TO ADMISSION: The patient lived with spouse but did not require assist.    Physical Therapy Goals  Initiated 1/28/2023-- Goals appropriate and add #6 2/6/2023  1. Patient will move from supine to sit and sit to supine  in bed with modified independence within 7 day(s). 2.  Patient will transfer from bed to chair and chair to bed with modified independence using the least restrictive device within 7 day(s). 3.  Patient will perform sit to stand with modified independence within 7 day(s). 4.  Patient will ambulate with modified independence for 300 feet with the least restrictive device within 7 day(s). 5.  Patient will ascend/descend 12 stairs with 1 handrail(s) with supervision/set-up within 7 day(s). 6.  Patient will verbally and functionally recall move in the tube techniques in prep for possible LVAD within 7 days. Outcome: Progressing Towards Goal       PHYSICAL THERAPY TREATMENT  Patient: Darshana Light (76 y.o. male)  Date: 2/9/2023  Diagnosis: CHF (congestive heart failure) ([facility]) [I50.9] <principal problem not specified>  Procedure(s) (LRB):  LEFT VENTRICULAR ASSIST DEVICE HEARTMATE 3 PERMANENT (Left)    Precautions: Fall  Chart, physical therapy assessment, plan of care and goals were reviewed. ASSESSMENT  Patient continues with skilled PT services and is progressing towards goals. Pt was able to ambulate with rollator with no LOB. Pt reports no fatigue with gait. Pt is aware of sit to stand and arm position with adherence to mindful movement principles in preparation for LVAD surgery. Educated on studying the [facility] handbook to continue education.  Pt could benefit from ambulating with nursing to optimize mobility prior to surgery     Current Level of Function Impacting Discharge (mobility/balance): CGA    Other factors to consider for discharge: LVAD next week         PLAN :  Patient continues to benefit from skilled intervention to address the above impairments. Continue treatment per established plan of care. to address goals. Recommendation for discharge: (in order for the patient to meet his/her long term goals)  To be determined: depending on progression     This discharge recommendation:  Has not yet been discussed the attending provider and/or case management    IF patient discharges home will need the following DME: patient owns DME required for discharge       SUBJECTIVE:   Patient stated I am fine.     OBJECTIVE DATA SUMMARY:   Critical Behavior:  Neurologic State: Alert  Orientation Level: Oriented X4  Cognition: Appropriate decision making, Appropriate safety awareness, Appropriate for age attention/concentration, Follows commands  Safety/Judgement: Awareness of environment, Fall prevention, Insight into deficits  Functional Mobility Training:  Bed Mobility:                    Transfers:  Sit to Stand: Stand-by assistance  Stand to Sit: Stand-by assistance                             Balance:  Sitting: Intact  Standing: Intact; With support  Ambulation/Gait Training:  Distance (ft): 600 Feet (ft)  Assistive Device: Gait belt;Walker, rollator  Ambulation - Level of Assistance: Stand-by assistance;Contact guard assistance        Gait Abnormalities: Decreased step clearance        Base of Support: Narrowed        Step Length: Right shortened;Left shortened                   Stairs:               Therapeutic Exercises:     Pain Rating:  No complaints     Activity Tolerance:   Fair    After treatment patient left in no apparent distress:   Sitting in chair and Call bell within reach    COMMUNICATION/COLLABORATION:   The patients plan of care was discussed with: Registered nurse.      Kelly Kiran PTA   Time Calculation: 31 mins

## 2023-02-09 NOTE — PROGRESS NOTES
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   Patient was approved for LVAD implantation as destination therapy at [facility] 2/3/23 with tentative plans to proceed to OR on 2/15/23; currently undergoing SGC-guided optimization in CV-ICU   The following have been identified as relative concerns pertaining to LVAD candidacy: small body surface area, cachexia, BMI 18, malnutrition, frailty, advanced age, muscular deconditioning, PVD (fingertips), urinary retention requiring donnelly catheter, neuroendocrine tumor class 1 requiring treatment after LVAD, heal injury requiring wound care consult, concerns regarding neurocognitive dysfunction, chronic kidney disease and hearing loss without functioning hearing aid  Plan for family meeting this week with palliative care team,  and AHF team      RECOMMENDATIONS:  Continue milrinone 0.375 mcg/kg/min;   Continue Dobutamine 2mcg/kg/min  No beta-blockers due to hypotension and RV conditioning prior to LVAD  Cannot tolerate ARB/ARNi due to hypotension and upcoming surgical procedure   Cannot tolerate spironolactone due to hyperkalemia  Cannot tolerate jardiance due to significant diuresis on smallest dose/contributed to IVVD  Discontinued corlanor due to SVT  Digoxin stopped d/t risk for toxicity with amio loading  Continue amiodarone, appreciate EP cardiology recs  Continue small dose of bumex daily   Appreciate nephrology recommendations   Continue Mag ox 400 mg daily   Keep K+ >4 and Mg >2  Continue eliquis 5mg twice daily- will stop Eliquis on Friday 2/10.  No bridging per Dr. Linda Rod   S/p Venofer 200mg x 2 doses  Check Iron profile on 2/13  Transfuse to keep hgb closer to 8 for potential surgical procedure  Abx/treatment of suspected PNA per hospitalist  Continue lifevest  Continue baby aspirin   Plavix previously stopped, okayed by Dr. Coronado Files consult pending (high risk for TRISTAN)  Heel pressure ulcer- wound care consulted  VAD coordinator to provide education  Focus on physical therapy and ambulate daily  Incentive spirometry and wean oxygen NC  Nutritionist consultation   consultation and daily support; help with hearing aids  Schedule family meeting this week to discuss upcoming operation- tentative OR date 2/15/23  Will give 1 dose Vit K on Tuesday 2/14  Will place lines in OR  Will need blood products and antibiotics ordered on Monday 2/13  Will have consents signed on Monday     All other care per primary team      INTERVAL HISTORY:  Hemodynamics stable   Milrinone 0.375  Dobutamine 2mcg  Hgb stable  Cr stable at 1.55  Net neg 3L  States he feels much better       IMPRESSION:  Acute on chronic combined systolic/diastolic  heart failure  Stage D, NYHA class IIIB/IV symptoms   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, LVEF 25-30% and 23% (by cMRI 1/3/23)  Cardiac MRI suggestive of ischemic cardiomyopathy  PYP equivocal  Chronic milrinone infusion as palliation   Coronary artery disease  CAD s/p CABG x 2: further disease best managed medically due to small vessel size   At risk of sudden cardiac death  Peripheral arterial disease  Bilateral hydronephrosis s/p donnelly  Cardiac risk factors:  HTN  HL  TRISTAN, STOP-BANG 4  DM2  CKD, stage 3  MOCA from 1/4/22 17/30, consistent with mild cognitive impairment  Hard of hearing  Gastric Neuroendocrine Tumor, elevated gastrin and chromogranin A  Sepsis, unclear source         LIFE GOALS:  Lifestyle goals reviewed with the patient. Patient's personal goals include: having a few more years with family  Important upcoming milestones or family events: None at this time   The patient identifies the following as important for living well: being at home, not being SOB              CARDIAC IMAGING:  Echo 1/27/23    Left Ventricle: EF by visual approximation is 20%. Left ventricle is mildly dilated. Mitral Valve: Moderately thickened leaflet, at the anterior and posterior leaflets. Moderately calcified leaflet. Moderate regurgitation. Left Atrium: Left atrium is moderately dilated. Technical qualifiers: Echo study was technically difficult with poor endocardial visualization. Contrast used: Definity. Limited study, not all structures viewed     Echo 1/9/23   Left Ventricle: Severely reduced left ventricular systolic function with a visually estimated EF of 25 - 30%. Left ventricle size is normal. Mildly increased wall thickness. Echo 12/26/22    Left Ventricle: Severely reduced left ventricular systolic function with a visually estimated EF of 25 - 30%. Left ventricle size is normal. Normal wall thickness. There are regional wall motion abnormalities. Grade II diastolic dysfunction with increased LAP. Right Ventricle: Moderately reduced systolic function.  TAPSE is abnormal. TAPSE is 1. 1 cm. Aortic Valve: Mild stenosis of the aortic valve. AV peak gradient is 13 mmHg. AV peak velocity is 1.8 m/s. Mitral Valve: Not well visualized. Moderate annular calcification at the posterior leaflet of the mitral valve. Mild to moderate regurgitation. Tricuspid Valve: Mildly elevated RVSP. Left Atrium: Left atrium is moderately dilated. 12/8/22    Left Ventricle: Moderately reduced left ventricular systolic function with a visually estimated EF of 35 - 40%. Severe hypokinesis of the following segments: mid anteroseptal, apical anterior, apical septal, apical inferior and apical lateral. Severe hypokinesis of the apex. Mitral Valve: Severely thickened leaflet, at the anterior and posterior leaflets. Severely calcified leaflet, at the anterior and posterior leaflets. Mild annular calcification of the mitral valve. Moderate regurgitation. Left Atrium: Left atrium is mildly dilated. Contrast used: Definity. limited study     EKG 12/22/22 ST, Biatria enlargement, marked ST abnormality     LHC 12/6/22  1. Normal LVEDP  2. Severe native multivessel coronary artery disease  3. Patent LIMA to LAD and vein graft to distal RCA  4. Recurrent ISR in OM1 stent with now 60 to 70% restenosis  5. Recoil of left main and circumflex stent with now recurrent 40 to 50% stenosis. 6.  Progression of ostial left main disease now to about 60% stenosis  7. Progression of disease in jailed first marginal branch now with diffuse 90% stenosis  8. High-grade stenosis in the mid to distal right potential femoral artery treated with 6 x 40 mm impact drug-coated balloon angioplasty to reduce the stenosis to less than 40%     NST        HEMODYNAMICS:  RHC 1/9/23  PA 20/9, RA 3, PCWP 8, CI 1.8     CPEST too ill   6MW 300 feet        HPI:  71 y.o. male who was admitted via ED for worsening SOB, poor appetite, orthopnea and fatigue.  Pmhx of CAD s/p CABG, PAD, DM 2, recent admission for HFmrEF earlier this month. Serial TTEs show progressive decline in EF since 3/2021 with the most recent EF at 25-30%. Recent LHC shows diffuse CAD and no interventions indicated. Patient had Charles Gar recently and there is some thought to myocarditis or other etiology causing worsening HF. [facility] was consulted for further evaluation and management of HFrEF. REVIEW OF SYSTEMS:  Review of Systems   Constitutional:  Negative for chills, fever and malaise/fatigue. Respiratory:  Negative for cough. DONALDSON but improved    Cardiovascular:  Negative for chest pain, palpitations and leg swelling. Gastrointestinal:  Negative for nausea and vomiting. Musculoskeletal:  Negative for falls and myalgias. Neurological:  Negative for dizziness and headaches. Psychiatric/Behavioral:  Negative for depression. PHYSICAL EXAM:  Visit Vitals  BP (!) 107/43   Pulse 76   Temp 97.7 °F (36.5 °C)   Resp 24   Ht 5' 6\" (1.676 m) Comment: per wife   Wt 121 lb 4.1 oz (55 kg)   SpO2 100%   BMI 19.57 kg/m²     Physical Exam  Vitals and nursing note reviewed. Constitutional:       Appearance: Normal appearance. He is normal weight. Cardiovascular:      Rate and Rhythm: Normal rate and regular rhythm. Pulses: Normal pulses. Heart sounds: Normal heart sounds. Pulmonary:      Effort: No respiratory distress. Breath sounds: Normal breath sounds. Abdominal:      General: There is no distension. Palpations: Abdomen is soft. Musculoskeletal:         General: Swelling present. Skin:     General: Skin is warm and dry. Neurological:      General: No focal deficit present. Mental Status: He is alert and oriented to person, place, and time.    Psychiatric:         Mood and Affect: Mood normal.          PAST MEDICAL HISTORY:  Past Medical History:   Diagnosis Date    CAD (coronary artery disease) 11/10/2016    NSTEMI & 2 stents    Deafness 10/28/2012    DM (diabetes mellitus) (HCC)     Elevated cholesterol Hypertension     NSTEMI (non-ST elevated myocardial infarction) ([facility] [address] 11/10/2016       PAST SURGICAL HISTORY:  Past Surgical History:   Procedure Laterality Date    COLONOSCOPY N/A 6/28/2018    COLONOSCOPY performed by Julito Thompson MD at [facility] ENDOSCOPY    COLONOSCOPY N/A 1/18/2023    COLONOSCOPY performed by Dorothy Pope MD at [facility] ENDOSCOPY    [address]  11/11/2016    2 stents       FAMILY HISTORY:  Family History   Problem Relation Age of Onset    Heart Disease Father     Heart Attack Father     Hypertension Mother     Elevated Lipids Brother     Elevated Lipids Brother     No Known Problems Sister     Elevated Lipids Brother     No Known Problems Son     No Known Problems Daughter     Anesth Problems Neg Hx        SOCIAL HISTORY:  Social History     Socioeconomic History    Marital status:    Tobacco Use    Smoking status: Never     Passive exposure: Never    Smokeless tobacco: Never   Vaping Use    Vaping Use: Never used   Substance and Sexual Activity    Alcohol use: Yes     Alcohol/week: 2.0 standard drinks     Types: 1 Cans of beer, 1 Shots of liquor per week     Comment: rarely    Drug use: No    Sexual activity: Yes     Social Determinants of Health     Financial Resource Strain: Medium Risk    Difficulty of Paying Living Expenses: Somewhat hard   Food Insecurity: Food Insecurity Present    Worried About Running Out of Food in the Last Year: Never true    Ran Out of Food in the Last Year: Often true       LABORATORY RESULTS:     Labs Latest Ref Rng & Units 2/9/2023 2/8/2023 2/8/2023 2/8/2023 2/7/2023 2/6/2023 2/5/2023   WBC 4.1 - 11.1 K/uL 5.7 - - 6.5 6.9 6.0 5.3   RBC 4.10 - 5.70 M/uL 3.56(L) - - 3.52(L) 3.37(L) 3.56(L) 3.46(L)   Hemoglobin 12.1 - 17.0 g/dL 8.9(L) - - 8. 9(L) 8. 3(L) 8.7(L) 8.5(L)   Hematocrit 36.6 - 50.3 % 30. 4(L) - - 29. 4(L) 28. 4(L) 30. 0(L) 29. 5(L)   MCV 80.0 - 99.0 FL 85.4 - - 83.5 84.3 84.3 85.3   Platelets 529 - 933 K/uL 177 - - 193 202 206 217   Lymphocytes 12 - 49 % 19 - - 14 16 19 15   Monocytes 5 - 13 % 10 - - 9 9 10 10   Eosinophils 0 - 7 % 4 - - 3 1 2 1   Basophils 0 - 1 % 0 - - 1 1 1 1   Albumin 3.5 - 5.0 g/dL 2. 8(L) - 3. 1(L) 3. 1(L) 3. 1(L) 3. 1(L) 3.4(L)   Calcium 8.5 - 10.1 MG/DL 9.0 9.3 8.9 9.1 8.9 8.9 8.9   Glucose 65 - 100 mg/dL 189(H) 129(H) 129(H) 124(H) 79 93 267(H)   BUN 6 - 20 MG/DL 31(H) 31(H) 31(H) 32(H) 31(H) 31(H) 39(H)   Creatinine 0.70 - 1.30 MG/DL 1.55(H) 1.54(H) 1.55(H) 1.60(H) 1.66(H) 1.68(H) 1.95(H)   Sodium 136 - 145 mmol/L 133(L) 136 134(L) 136 137 139 134(L)   Potassium 3.5 - 5.1 mmol/L 4.4 4.0 4.2 4.1 4.0 4.4 4.5   TSH 0.36 - 3.74 uIU/mL - - - - - - -   PSA 0.01 - 4.0 ng/mL - - - - - - -   LDH 85 - 241 U/L - - - - 401(H) - -   Some recent data might be hidden     Lab Results   Component Value Date/Time    TSH 3.52 01/28/2023 05:26 AM    TSH 2.12 12/27/2022 02:36 PM    TSH 4.80 (H) 12/06/2022 03:53 AM    TSH 5.39 (H) 10/12/2022 09:10 AM    TSH 3.53 02/03/2022 11:47 AM    TSH 5.790 (H) 11/21/2019 04:45 PM    TSH 3.08 06/22/2018 01:53 PM    TSH 4.250 05/26/2015 09:43 AM       ALLERGY:  No Known Allergies     CURRENT MEDICATIONS:    Current Facility-Administered Medications:     insulin lispro (HUMALOG) injection 8 Units, 8 Units, SubCUTAneous, TID WITH MEALS, Cathy Sheldon CNS, 8 Units at 02/09/23 0925    insulin glargine (LANTUS) injection 6 Units, 6 Units, SubCUTAneous, DAILY, Cathy Sheldon CNS, 6 Units at 02/09/23 0924    milrinone (PRIMACOR) 20 MG/100 ML D5W infusion, 0.375 mcg/kg/min, IntraVENous, CONTINUOUS, Maggie Mueller MD, Last Rate: 6.5 mL/hr at 02/09/23 0805, 0.375 mcg/kg/min at 02/09/23 0805    albuterol-ipratropium (DUO-NEB) 2.5 MG-0.5 MG/3 ML, 3 mL, Nebulization, Q4H PRN, Ramos MOLINA MD, 3 mL at 02/06/23 1539    bumetanide (BUMEX) tablet 1 mg, 1 mg, Oral, BID, Maggie Mueller MD, 1 mg at 02/09/23 0828    DOBUTamine (DOBUTREX) 500 mg/250 mL (2,000 mcg/mL) infusion, 2 mcg/kg/min, IntraVENous, CONTINUOUS, Maggie Mueller MD, Last Rate: 3.5 mL/hr at 02/09/23 0805, 2 mcg/kg/min at 02/09/23 0805    magnesium oxide (MAG-OX) tablet 400 mg, 400 mg, Oral, DAILY, Shaila, Lizette B, NP, 400 mg at 02/09/23 0827    0.9% sodium chloride infusion, 6 mL/hr, IntraVENous, CONTINUOUS, Maggie Mueller MD, Last Rate: 3 mL/hr at 02/09/23 0804, 3 mL/hr at 02/09/23 0804    0.9% sodium chloride infusion 250 mL, 250 mL, IntraVENous, PRN, Shaila, Lizette B, NP    alteplase (CATHFLO) 1 mg in sterile water (preservative free) 1 mL injection, 1 mg, InterCATHeter, PRN, Shaila, Lizette B, NP    bacitracin 500 unit/gram packet 1 Packet, 1 Packet, Topical, PRN, Shaila, Lizette B, NP    bumetanide (BUMEX) injection 1 mg, 1 mg, IntraVENous, Q4H PRN, Shaila, Lizette B, NP, 1 mg at 02/06/23 1349    epoetin heidy-epbx (RETACRIT) injection 10,000 Units, 10,000 Units, SubCUTAneous, Q7D, Jadiel Galvan MD, 10,000 Units at 02/02/23 2213    senna-docusate (PERICOLACE) 8.6-50 mg per tablet 1 Tablet, 1 Tablet, Oral, DAILY PRN, Tiffanie Sera B, NP    traZODone (DESYREL) tablet 50 mg, 50 mg, Oral, QHS PRN, Rahul Clay MD, 50 mg at 02/08/23 2236    [COMPLETED] apixaban (ELIQUIS) tablet 10 mg, 10 mg, Oral, BID, 10 mg at 02/06/23 0905 **FOLLOWED BY** apixaban (ELIQUIS) tablet 5 mg, 5 mg, Oral, BID, Shaila, Lizette B, NP, 5 mg at 02/09/23 0827    glucose chewable tablet 16 g, 4 Tablet, Oral, PRN, Ita Harvey NP    glucagon (GLUCAGEN) injection 1 mg, 1 mg, IntraMUSCular, PRN, Ita Harvey NP    dextrose 10 % infusion 0-250 mL, 0-250 mL, IntraVENous, PRN, Ita Harvey NP    insulin lispro (HUMALOG) injection, , SubCUTAneous, AC&HS, Ita Harvey NP, 2 Units at 02/09/23 0924    balsam peru-castor oiL (VENELEX) ointment, , Topical, BID, Alejandra Sampson MD, Given at 02/09/23 0828    lidocaine 4 % patch 1 Patch, 1 Patch, TransDERmal, Q24H, Rahul Clay MD, 1 Patch at 02/08/23 1146    amiodarone (CORDARONE) tablet 400 mg, 400 mg, Oral, BID, Kimmy George MD, 400 mg at 02/09/23 0827    aspirin delayed-release tablet 81 mg, 81 mg, Oral, DAILY, Heber Barajas MD, 81 mg at 02/09/23 0828    sodium chloride (NS) flush 5-40 mL, 5-40 mL, IntraVENous, Q8H, Heber Barajas MD, 10 mL at 02/09/23 0608    sodium chloride (NS) flush 5-40 mL, 5-40 mL, IntraVENous, PRN, Heber Barajas MD    acetaminophen (TYLENOL) tablet 650 mg, 650 mg, Oral, Q6H PRN, 650 mg at 02/07/23 0214 **OR** acetaminophen (TYLENOL) suppository 650 mg, 650 mg, Rectal, Q6H PRN, Heber Barajas MD    polyethylene glycol (MIRALAX) packet 17 g, 17 g, Oral, DAILY PRN, Heber Barajas MD, 17 g at 02/02/23 1539    ondansetron (ZOFRAN ODT) tablet 4 mg, 4 mg, Oral, Q8H PRN, 4 mg at 01/30/23 1357 **OR** ondansetron (ZOFRAN) injection 4 mg, 4 mg, IntraVENous, Q6H PRN, Heber Barajas MD, 4 mg at 02/03/23 1926    pantoprazole (PROTONIX) tablet 40 mg, 40 mg, Oral, ACB, Heber Barajas MD, 40 mg at 02/09/23 0701    rosuvastatin (CRESTOR) tablet 20 mg, 20 mg, Oral, QHS, Heber Barajas MD, 20 mg at 02/08/23 2231    PATIENT CARE TEAM:  Patient Care Team:  Amando Ross MD as PCP - General (Family Medicine)  Amando Ross MD as PCP - [facility]  Katelynn Callaway MD (Cardiovascular Disease Physician)  Hermes Carreon MD (Gastroenterology)  General Pily MD (Cardiothoracic Surgery)  Olu Parker MD (Cardiovascular Disease Physician)  Lucita Kelly MD (Nephrology)  Jonah Lund RN as Care Transitions Nurse  Meredith Briceño MD (Pulmonary Disease)  Nasir Nichols MD ([address] [place], [place] [address] [facility] Vascular Surgery)     Thank you for allowing me to participate in this patient's care. Total Critical Care time spent:    50 minutes.  There was no overlap with other services        Critical care was necessary to treat or prevent imminent or life threatening deterioration of the following conditions: cardiac failure, respiratory failure and CNS failure or compromise     Services Provided:  1. Telemetry review and 12 lead ECG interpretation  2. Hemodynamic interpretation, assessment, and management  3. Review and interpretation of CXR  4. Review and interpretation of lab values  5. Review and interpretation of microbiologic data and culture results  6. Review of medications and administration  7. Review and interpretation of nutrition requirements and management  8. Discussion of management withother consultants and services  9.  Clinical update to family members      Moriah Simmons NP   [address] [place] [address], [address]  Phone: [phone]

## 2023-02-09 NOTE — PROGRESS NOTES
2000: Bedside shift change report given to Naty MILAN RN/Marisol SHABAZZ RN (oncoming nurse) by Angel Servin RN (offgoing nurse). Report included the following information SBAR, Intake/Output, MAR, Recent Results, Cardiac Rhythm NSR, and Alarm Parameters . 2239: pt transferred to CVICU 33 to promote rest    0800: Bedside shift change report given to Tereso Christensen RN (oncoming nurse) by Solis Sung RN/Marisol SHABAZZ RN(offgoing nurse). Report included the following information SBAR, Intake/Output, MAR, Recent Results, Cardiac Rhythm NSR, and Alarm Parameters .      Shift summary: uneventful shift, pt given PRN trazadone for rest    I have reviewed and agree with Ronnell Wong RN charting, assessment, and med admin

## 2023-02-09 NOTE — PROGRESS NOTES
0800- bedside report and drips verified. Patient in the chair without complaints. 1545- PT at bedside with patient. Uneventful shift. Patient stood a few times and shifted his weight in the chair frequently. 1900- 1 assist back to bed. 2000- Bedside and Verbal shift change report given to LORI Vee (oncoming nurse) by Barbara Brewer RN (offgoing nurse).  Report included the following information SBAR, Recent Results, and Cardiac Rhythm SR .

## 2023-02-09 NOTE — PROGRESS NOTES
CRITICAL CARE ADMISSION NOTE      Name: Anna Webber   : 1950   MRN: [id number]   Date: 2023      Reason for ICU Admission: Acute on chronic HFrEF, LVAD workup     ICU PROBLEM LIST   Acute on chronic HFrEF (20%) NYHA IIIb/IV (D) on home inotropic support, life vest  TANNER on CKD3  Aflutter w/ RVR  Acute on chronic hypoxemic respiratory failure  CAP  CAD  Gastric neuroendocrine tumor  Hx of LUE DVT  DM  PAD  TRISTAN  BPH w/ urinary retention requiring chronic donnelly    HISTORY OF PRESENT ILLNESS:   Pt is a 71 y.o M w/ PMH as noted above admitted to [facility] on  due to ongoing symptoms secondary to his HFrEF. Treated for possible CAP, and diuresed for acute on chronic HFrEF. Nephrology following for TANNER on CKD 3. AHF team recommended transfer to CVICU for [facility] placement and ongoing LVAD workup with plan for placement 2/15.     24 HOUR EVENTS:   Doing well this AM on dual inotropes, net neg 3L    NEUROLOGICAL:    Mentation appropriate  Delirium precautions  Encourage work w/ PT/OT    PULMONOLOGY:   O2 per NC PRN for spO2 >92%    CARDIOVASCULAR:   Scheduled and PRN bumex  Goal net negative  C/w milrinone, dobutamine  Telemetry, close hemodynamic monitoring  Unable to tolerate BB, ARNI/ARB due to , aldactone held 2/2 elevated K   C/w ASA, amiodarone, statin  Will need Life Vest replaced  Ongoing LVAD workup by AHF team, plan for LVAD placement 2/15  Monitor for signs of hypoperfusion, monitor UOP    GASTROINTESTINAL:   PPI  Cardiac, renal, diabetic diet as tolerated      RENAL/ELECTROLYTE/FLUIDS:   Strict I/Os  Nephrology following  Monitor RFP  Mg/Phos/K >2/3/4  Donnelly to remain in place  PRN bumex    ENDOCRINE:   SSI, Basal insulin  Hypoglycemic protocol  Glucose goal 120-180    HEMATOLOGY/ONCOLOGY:   On eliquis  EPO weekly  Gastric neuroendocrine tumor, well differentiated, good prognosis per onc.    Not barrier to LVAD implant    ID/MICRO:   S/p CAP treatment     ICU DAILY CHECKLIST     Code Status:Full  DVT Prophylaxis:Eliquis  T/L/D: Pedro NORRIS  SUP: PPI  Diet: ADAT  Activity Level:ad jose f  ABCDEF Bundle/Checklist Completed:Yes  Disposition: Stay in ICU, likely TTF in AM  Multidisciplinary Rounds Completed:  yes  Patient/Family Updated: Yes    Tatiana   2/3 Transferred to CVICU for [facility] placement   started on dobutamine as adjunct to milrinone    SUBJECTIVE:     As noted above    Review of Systems:     Review of Systems   Constitutional:  Positive for malaise/fatigue. Negative for chills and fever. HENT:  Negative for congestion and sinus pain. Eyes:  Negative for blurred vision, double vision and pain. Respiratory:  Positive for shortness of breath. Negative for hemoptysis and sputum production. Cardiovascular:  Negative for chest pain, palpitations and leg swelling. Gastrointestinal:  Negative for abdominal pain, nausea and vomiting. Genitourinary:  Negative for dysuria, frequency and urgency. Musculoskeletal:  Negative for back pain, joint pain and myalgias. Skin:  Negative for itching and rash. Neurological:  Negative for tremors, sensory change, speech change and focal weakness. Psychiatric/Behavioral:  Negative for hallucinations. The patient is not nervous/anxious.        OBJECTIVE:     Labs and Data: Reviewed 23  Medications: Reviewed 23  Imaging: Reviewed 23    General - chronically ill, sarcopenia, OOBTC  HEENT- pupils equal, EOMI, anicteric  Neuro - moves all extrem, follows basic commands, no focal deficit  CV - RRR, systolic murmur  Resp - rales b/l, NC  Abd - soft, nontender, no guarding  MSK- intact, no sacral ulcer      Visit Vitals  BP (!) 107/43   Pulse 76   Temp 97.7 °F (36.5 °C)   Resp 24   Ht 5' 6\" (1.676 m) Comment: per wife   Wt 55 kg (121 lb 4.1 oz)   SpO2 100%   BMI 19.57 kg/m²    O2 Flow Rate (L/min): 2 l/min O2 Device: Nasal cannula Temp (24hrs), Av.8 °F (36.6 °C), Min:97.3 °F (36.3 °C), Max:98.3 °F (36.8 °C)    CVP (mmHg): 1 mmHg (02/09/23 0800)      Intake/Output:     Intake/Output Summary (Last 24 hours) at 2/9/2023 1037  Last data filed at 2/9/2023 0900  Gross per 24 hour   Intake 325 ml   Output 3550 ml   Net -3225 ml         Imaging    01/26/23    ECHO ADULT FOLLOW-UP OR LIMITED 01/27/2023 1/27/2023    Interpretation Summary    Left Ventricle: EF by visual approximation is 20%. Left ventricle is mildly dilated. Mitral Valve: Moderately thickened leaflet, at the anterior and posterior leaflets. Moderately calcified leaflet. Moderate regurgitation. Left Atrium: Left atrium is moderately dilated. Technical qualifiers: Echo study was technically difficult with poor endocardial visualization. Contrast used: Definity. Limited study, not all structures viewed    Signed by: Clive Carter MD on 1/27/2023  4:39 PM       Pertinent imaging reviewed and interpreted as noted above    CRITICAL CARE DOCUMENTATION  I had a face to face encounter with the patient, reviewed and interpreted patient data including clinical events, labs, images, vital signs, I/O's, and examined patient. I have discussed the case and the plan and management of the patient's care with the consulting services, the bedside nurses and the respiratory therapist.      NOTE OF PERSONAL INVOLVEMENT IN CARE   This patient has a high probability of imminent, clinically significant deterioration, which requires the highest level of preparedness to intervene urgently. I participated in the decision-making and personally managed or directed the management of the following life and organ supporting interventions that required my frequent assessment to treat or prevent imminent deterioration. I personally spent 35 minutes of critical care time. This is time spent at this critically ill patient's bedside actively involved in patient care as well as the coordination of care. This does not include any procedural time which has been billed separately. Walker HOWARD Leland Cooper MD  Staff [address] [place] [address]

## 2023-02-10 NOTE — ACP (ADVANCE CARE PLANNING)
Advance Care Planning     Advance Care Planning (ACP) Physician/NP/PA Conversation      Date of Conversation: 1/26/2023  Conducted with: Mr. Yosi Robertson and his wife. Healthcare Decision Maker:     Primary Decision Maker: Susanna Kendall - Spouse - [phone]    Secondary Decision Maker: Marguerita Hashimoto - Daughter - [phone]    Secondary Decision Maker: Xander Ramachandran - [phone]  Click here to complete [address] including selection of the Healthcare Decision Maker Relationship (ie \"Primary\")    Mr. Yosi Robertson completed AMD today, he designated his wife, Viky Neal to serve as his primary health care decision maker and his children, Virginia Sosa and PADMA, to serve as secondary. Care Preferences:    Hospitalization: \"If your health worsens and it becomes clear that your chance of recovery is unlikely, what would be your preference regarding hospitalization? \"  Mr. Yosi Robertson is pursuing LVAD and therefore wants all interventions deemed appropriate at this time, including re-hospitalizations. However, Mr. Giuliana Bullard family has a good understanding of what is important to him, what makes his life meaningful and what he considers to be quality of life, which will help guide them with decision making should he have a complication or other medical event moving forward. Ventilation: \"If you were unable to breathe on your own and your chance of recovery was unlikely, what would be your preference about the use of a ventilator (breathing machine) if it was available to you? \"     Mr. Yosi Robertson is open to Intubation/Ventilator support,  however, if he does not pass breathing trials and medical team is unable to extubate him, then he wants to be compassionately extubated, including administration of comfort medications to prevent him from suffering. Resuscitation:  \"In the event your heart stopped as a result of an underlying serious health condition, would you want attempts to be made to restart your heart, or would you prefer a natural death? \"   Yes, at this time, Mr. Pletiez Roscommon would [address] CPR. However, he understands he can revisit CPR discussion at anytime . The [facility]  understand that if an unfortunate event were to occur and unlikely Mr. Kendall would make meaningful recovery, they can request code status change to DNR. Additional topics discussed: hospice care  Benefits vs Burdens of treatments and interventions  Discussed potential complications s/p LVAD   including bleeding, stroke, infection, renal failure. Explored possible scenarios of each and what Mr. Kendall thinks he would want to do. For example, If Mr. Kendall renal function worsened, he stated that he is willing to try Dialysis, understanding that if the burden does not outweigh the benefit, then he could always choose option of comfort care with Hospice. Mrs. Kendall participated in this discussion, asked questions and felt confident that family has a good idea of Mr. Teague wishes, should they be called upon for medical decision making.      Conversation Outcomes / Follow-Up Plan:   ACP complete - no further action today  Reviewed DNR/DNI and patient elects Full Code (Attempt Resuscitation)     Copy of AMD Scanned into EMR    Length of Voluntary ACP Conversation in minutes:  28 minutes    Anthony Green NP

## 2023-02-10 NOTE — PROGRESS NOTES
RENAL  PROGRESS NOTE        Subjective:   Sitting in chair  Objective:   VITALS SIGNS:    Visit Vitals  BP (!) 104/50 (BP 1 Location: Left upper arm, BP Patient Position: At rest)   Pulse 79   Temp 98.1 °F (36.7 °C)   Resp 18   Ht 5' 6\" (1.676 m) Comment: per wife   Wt 55.3 kg (121 lb 14.6 oz)   SpO2 100%   BMI 19.68 kg/m²       O2 Device: Nasal cannula   O2 Flow Rate (L/min): 2 l/min   Temp (24hrs), Av.8 °F (36.6 °C), Min:97.3 °F (36.3 °C), Max:98.1 °F (36.7 °C)         PHYSICAL EXAM:  NAD  Some edema    DATA REVIEW:     INTAKE / OUTPUT:   Last shift:      No intake/output data recorded. Last 3 shifts:  190 - 02/10 0700  In: 966.6 [P.O.:480; I.V.:486.6]  Out: 4550 [Urine:4550]    Intake/Output Summary (Last 24 hours) at 2/10/2023 0859  Last data filed at 2/10/2023 0700  Gross per 24 hour   Intake 787.55 ml   Output 2150 ml   Net -1362.45 ml           LABS:   Recent Labs     02/10/23  0524 23  0352 23  0507   WBC 5.8 5.7 6.5   HGB 9.5* 8.9* 8.9*   HCT 32.5* 30.4* 29.4*    177 193       Recent Labs     02/10/23  0518 23  0352 23  0815 23  0507   * 133* 136 134*  136   K 4.5 4.4 4.0 4.2  4.1   CL 99 98 102 100  102   CO2 32 27 26 29  25   * 189* 129* 129*  124*   BUN 29* 31* 31* 31*  32*   CREA 1.60* 1.55* 1.54* 1.55*  1.60*   CA 8.9 9.0 9.3 8.9  9.1   MG 2.3 2.2  --  2.1   PHOS 2.8 2.6 3.3  --    ALB 3.0* 2.8*  --  3.1*  3.1*   TBILI 0.5 0.6  --  0.6  0.6   ALT 24 16  --  20  21   INR  --  1.4*  --   --            Assessment:  TANNER on CKD-3a: Suspect cardiorenal effects with needed diuresis. Hypotension/poor renal perfusion likely culprit. Recent baseline Cr has been in the low 1s. Hyperkalemia: better     Ischemic CM: Recurrent exacerbations. EF 20%. On continuous Milrinone since last admission.  Interested in LVAD-> sorting out candidacy     BPH: s/p donnelly     Well differentiated NET Grade 1: noted on EGD 23     Anemia 2 to CKD:Hgb sub-optimal     Hyponatremia: mild-> better       Left UE basilic and radial vein thrombus     Plan/Recommendations:  Creatinine  stable   LVAD w/up ,next week     BRIGHT (last dose 2/2/23)  Diuretics as per cardiology      Valentin Campos MD

## 2023-02-10 NOTE — PROGRESS NOTES
Cardiac Surgery Care Coordinator-  Met with Ana Torres, Introduced role of the Cardiac Surgery Care Coordinator. Reviewed plan of care and began pre-op education. Discussed day of surgery expectations for the pt and family. Encouraged pt on the proper use of the incentive spirometer, he is able to pull 1000cc with good effort. Encouraged Ana Torres to verbalize and offered emotional support.  Megan Gonzalez RN

## 2023-02-10 NOTE — PROGRESS NOTES
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 Denia Parrish  Patient presented with symptomatic SVT (a-flutter) with RVR converted to sinus tach after amio loading; in the setting of pneumonia and likely intolerance to inotropes  Patient completed remainder of evaluation for LVAD for destination therapy while in-patient and treatment of pneumonia; during LVAD eval, found to have well-differentiated neuroendocrine tumor on gastric body and gastric polyp, G1, per oncology treatable tumor with good prognosis and would not preclude LVAD.   Patient was approved for LVAD implantation as destination therapy at [facility] 2/3/23 with tentative plans to proceed to OR on 2/15/23; currently undergoing SGC-guided optimization in CV-ICU   The following have been identified as relative concerns pertaining to LVAD candidacy: small body surface area, cachexia, BMI 18, malnutrition, frailty, advanced age, muscular deconditioning, PVD (fingertips), urinary retention requiring donnelly catheter, neuroendocrine tumor class 1 requiring treatment after LVAD, heal injury requiring wound care consult, concerns regarding neurocognitive dysfunction, chronic kidney disease and hearing loss without functioning hearing aid        RECOMMENDATIONS:  Continue milrinone 0.375 mcg/kg/min- will stop milrinone on Tuesday morning   Continue Dobutamine 2mcg/kg/min  No beta-blockers due to hypotension and RV conditioning prior to LVAD  Cannot tolerate ARB/ARNi due to hypotension and upcoming surgical procedure   Cannot tolerate spironolactone due to hyperkalemia  Cannot tolerate jardiance due to significant diuresis on smallest dose/contributed to IVVD  Discontinued corlanor due to SVT  Digoxin stopped d/t risk for toxicity with amio loading  Continue amiodarone, appreciate EP cardiology recs  Continue small dose of bumex daily   Appreciate nephrology recommendations   Continue Mag ox 400 mg daily   Keep K+ >4 and Mg >2  Continue eliquis 5mg twice daily- will stop Eliquis on Monday .  No bridging per Dr. Salguero Generous   S/p Venofer 200mg x 2 doses  Check Iron profile on 2/13  Transfuse to keep hgb closer to 8 for potential surgical procedure  Abx/treatment of suspected PNA per hospitalist  Continue lifevest  Continue baby aspirin   Plavix previously stopped, okayed by Dr. Kovacs Links consult pending (high risk for TRISTAN)  Heel pressure ulcer- wound care consulted  VAD coordinator to provide education  Focus on physical therapy and ambulate daily  Incentive spirometry and wean oxygen [place]  Nutritionist consultation   consultation and daily support; help with hearing aids  Appreciate Palliative care recommendations   Tentative OR date 2/15/23  Will give 1 dose Vit K on Tuesday 2/14  Will place lines in OR  Remove PICC line on Monday, will use PIV for inotropes for 24hours   Will need blood products and antibiotics ordered on Monday 2/13  Will have consents signed on Monday     All other care per primary team      INTERVAL HISTORY:  Hemodynamics stable   Milrinone 0.375  Dobutamine 2mcg  Hgb stable  Cr stable at 1.6  Net neg 1.4  Weaning down O2  Ambulating and eating well   Repeat [place] improved       IMPRESSION:  Acute on chronic combined systolic/diastolic  heart failure  Stage D, NYHA class IIIB/IV symptoms   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, LVEF 25-30% and 23% (by cMRI 1/3/23)  Cardiac MRI suggestive of ischemic cardiomyopathy  PYP equivocal  Chronic milrinone infusion as palliation   Coronary artery disease  CAD s/p CABG x 2: further disease best managed medically due to small vessel size   At risk of sudden cardiac death  Peripheral arterial disease  Bilateral hydronephrosis s/p donnelly  Cardiac risk factors:  HTN  HL  TRISTAN, STOP-BANG 4  DM2  CKD, stage 3  MOCA from 2/9 19/30, consistent with mild cognitive impairment  Hard of hearing  Gastric Neuroendocrine Tumor, elevated gastrin and chromogranin A  Sepsis, unclear source- resolved          LIFE GOALS:  Lifestyle goals reviewed with the patient. Patient's personal goals include: having a few more years with family  Important upcoming milestones or family events: None at this time   The patient identifies the following as important for living well: being at home, not being SOB              CARDIAC IMAGING:  Echo 1/27/23    Left Ventricle: EF by visual approximation is 20%. Left ventricle is mildly dilated. Mitral Valve: Moderately thickened leaflet, at the anterior and posterior leaflets. Moderately calcified leaflet. Moderate regurgitation. Left Atrium: Left atrium is moderately dilated. Technical qualifiers: Echo study was technically difficult with poor endocardial visualization. Contrast used: Definity. Limited study, not all structures viewed     Echo 1/9/23   Left Ventricle: Severely reduced left ventricular systolic function with a visually estimated EF of 25 - 30%. Left ventricle size is normal. Mildly increased wall thickness. Echo 12/26/22    Left Ventricle: Severely reduced left ventricular systolic function with a visually estimated EF of 25 - 30%. Left ventricle size is normal. Normal wall thickness. There are regional wall motion abnormalities. Grade II diastolic dysfunction with increased LAP. Right Ventricle:  Moderately reduced systolic function. TAPSE is abnormal. TAPSE is 1.1 cm. Aortic Valve: Mild stenosis of the aortic valve. AV peak gradient is 13 mmHg. AV peak velocity is 1.8 m/s. Mitral Valve: Not well visualized. Moderate annular calcification at the posterior leaflet of the mitral valve. Mild to moderate regurgitation. Tricuspid Valve: Mildly elevated RVSP. Left Atrium: Left atrium is moderately dilated. 12/8/22    Left Ventricle: Moderately reduced left ventricular systolic function with a visually estimated EF of 35 - 40%. Severe hypokinesis of the following segments: mid anteroseptal, apical anterior, apical septal, apical inferior and apical lateral. Severe hypokinesis of the apex. Mitral Valve: Severely thickened leaflet, at the anterior and posterior leaflets. Severely calcified leaflet, at the anterior and posterior leaflets. Mild annular calcification of the mitral valve. Moderate regurgitation. Left Atrium: Left atrium is mildly dilated. Contrast used: Definity. limited study     EKG 12/22/22 ST, Biatria enlargement, marked ST abnormality     C 12/6/22  1. Normal LVEDP  2. Severe native multivessel coronary artery disease  3. Patent LIMA to LAD and vein graft to distal RCA  4. Recurrent ISR in OM1 stent with now 60 to 70% restenosis  5. Recoil of left main and circumflex stent with now recurrent 40 to 50% stenosis. 6.  Progression of ostial left main disease now to about 60% stenosis  7. Progression of disease in jailed first marginal branch now with diffuse 90% stenosis  8. High-grade stenosis in the mid to distal right potential femoral artery treated with 6 x 40 mm impact drug-coated balloon angioplasty to reduce the stenosis to less than 40%     NST        HEMODYNAMICS:  RHC 1/9/23  PA 20/9, RA 3, PCWP 8, CI 1.8     CPEST too ill   6MW 300 feet        HPI:  71 y.o. male who was admitted via ED for worsening SOB, poor appetite, orthopnea and fatigue.  Pmhx of CAD s/p CABG, PAD, DM 2, recent admission for HFmrEF earlier this month. Serial TTEs show progressive decline in EF since 3/2021 with the most recent EF at 25-30%. Recent LHC shows diffuse CAD and no interventions indicated. Patient had Rawleigh Kyle recently and there is some thought to myocarditis or other etiology causing worsening HF. [facility] was consulted for further evaluation and management of HFrEF. REVIEW OF SYSTEMS:  Review of Systems   Constitutional:  Negative for chills, fever and malaise/fatigue. Respiratory:  Negative for cough. DONALDSON but improved    Cardiovascular:  Negative for chest pain, palpitations and leg swelling. Gastrointestinal:  Negative for nausea and vomiting. Musculoskeletal:  Negative for falls and myalgias. Neurological:  Negative for dizziness and headaches. Psychiatric/Behavioral:  Negative for depression. PHYSICAL EXAM:  Visit Vitals  BP (!) 105/55 (BP 1 Location: Left upper arm, BP Patient Position: At rest)   Pulse 76   Temp 97.8 °F (36.6 °C)   Resp 23   Ht 5' 6\" (1.676 m) Comment: per wife   Wt 121 lb 14.6 oz (55.3 kg)   SpO2 98%   BMI 19.68 kg/m²     Physical Exam  Vitals and nursing note reviewed. Constitutional:       Appearance: Normal appearance. He is normal weight. Cardiovascular:      Rate and Rhythm: Normal rate and regular rhythm. Pulses: Normal pulses. Heart sounds: Normal heart sounds. Pulmonary:      Effort: No respiratory distress. Breath sounds: Normal breath sounds. Abdominal:      General: There is no distension. Palpations: Abdomen is soft. Musculoskeletal:         General: No swelling. Skin:     General: Skin is warm and dry. Neurological:      General: No focal deficit present. Mental Status: He is alert and oriented to person, place, and time.    Psychiatric:         Mood and Affect: Mood normal.          PAST MEDICAL HISTORY:  Past Medical History:   Diagnosis Date    CAD (coronary artery disease) 11/10/2016    NSTEMI & 2 stents    Deafness 10/28/2012    DM (diabetes mellitus) ([facility] [address] [facility]     Elevated cholesterol     Hypertension     [facility] 11/10/2016       PAST SURGICAL HISTORY:  Past Surgical History:   Procedure Laterality Date    COLONOSCOPY N/A 6/28/2018    COLONOSCOPY performed by Julito Thompson MD at [facility] ENDOSCOPY    COLONOSCOPY N/A 1/18/2023    COLONOSCOPY performed by Dorothy Pope MD at [facility] ENDOSCOPY    [address]  11/11/2016    2 stents       FAMILY HISTORY:  Family History   Problem Relation Age of Onset    Heart Disease Father     Heart Attack Father     Hypertension Mother     Elevated Lipids Brother     Elevated Lipids Brother     No Known Problems Sister     Elevated Lipids Brother     No Known Problems Son     No Known Problems Daughter     Anesth Problems Neg Hx        SOCIAL HISTORY:  Social History     Socioeconomic History    Marital status:    Tobacco Use    Smoking status: Never     Passive exposure: Never    Smokeless tobacco: Never   Vaping Use    Vaping Use: Never used   Substance and Sexual Activity    Alcohol use: Yes     Alcohol/week: 2.0 standard drinks     Types: 1 Cans of beer, 1 Shots of liquor per week     Comment: rarely    Drug use: No    Sexual activity: Yes     Social Determinants of Health     Financial Resource Strain: Medium Risk    Difficulty of Paying Living Expenses: Somewhat hard   Food Insecurity: Food Insecurity Present    Worried About Running Out of Food in the Last Year: Never true    Ran Out of Food in the Last Year: Often true       LABORATORY RESULTS:     Labs Latest Ref Rng & Units 2/10/2023 2/9/2023 2/8/2023 2/8/2023 2/8/2023 2/7/2023 2/6/2023   WBC 4.1 - 11.1 K/uL 5.8 5.7 - - 6.5 6.9 6.0   RBC 4.10 - 5.70 M/uL 3.77(L) 3.56(L) - - 3.52(L) 3.37(L) 3.56(L)   Hemoglobin 12.1 - 17.0 g/dL 9.5(L) 8. 9(L) - - 8. 9(L) 8. 3(L) 8.7(L)   Hematocrit 36.6 - 50.3 % 32. 5(L) 30.4(L) - - 29. 4(L) 28. 4(L) 30. 0(L)   MCV 80.0 - 99.0 FL 86.2 85.4 - - 83.5 84.3 84.3   Platelets 989 - 705 K/uL 192 177 - - 193 202 206   Lymphocytes 12 - 49 % - 19 - - 14 16 19   Monocytes 5 - 13 % - 10 - - 9 9 10   Eosinophils 0 - 7 % - 4 - - 3 1 2   Basophils 0 - 1 % - 0 - - 1 1 1   Albumin 3.5 - 5.0 g/dL 3. 0(L) 2. 8(L) - 3. 1(L) 3. 1(L) 3. 1(L) 3. 1(L)   Calcium 8.5 - 10.1 MG/DL 8.9 9.0 9.3 8.9 9.1 8.9 8.9   Glucose 65 - 100 mg/dL 180(H) 189(H) 129(H) 129(H) 124(H) 79 93   BUN 6 - 20 MG/DL 29(H) 31(H) 31(H) 31(H) 32(H) 31(H) 31(H)   Creatinine 0.70 - 1.30 MG/DL 1.60(H) 1.55(H) 1.54(H) 1.55(H) 1.60(H) 1.66(H) 1.68(H)   Sodium 136 - 145 mmol/L 134(L) 133(L) 136 134(L) 136 137 139   Potassium 3.5 - 5.1 mmol/L 4.5 4.4 4.0 4.2 4.1 4.0 4.4   TSH 0.36 - 3.74 uIU/mL - - - - - - -   PSA 0.01 - 4.0 ng/mL - - - - - - -   LDH 85 - 241 U/L - - - - - 401(H) -   Some recent data might be hidden     Lab Results   Component Value Date/Time    TSH 3.52 01/28/2023 05:26 AM    TSH 2.12 12/27/2022 02:36 PM    TSH 4.80 (H) 12/06/2022 03:53 AM    TSH 5.39 (H) 10/12/2022 09:10 AM    TSH 3.53 02/03/2022 11:47 AM    TSH 5.790 (H) 11/21/2019 04:45 PM    TSH 3.08 06/22/2018 01:53 PM    TSH 4.250 05/26/2015 09:43 AM       ALLERGY:  No Known Allergies     CURRENT MEDICATIONS:    Current Facility-Administered Medications:     insulin lispro (HUMALOG) injection 8 Units, 8 Units, SubCUTAneous, TID WITH MEALS, Cathy Sheldon, CNS, 8 Units at 02/10/23 0834    insulin glargine (LANTUS) injection 6 Units, 6 Units, SubCUTAneous, DAILY, Cathy Sheldon, CNS, 6 Units at 02/10/23 0834    milrinone (PRIMACOR) 20 MG/100 ML D5W infusion, 0.375 mcg/kg/min, IntraVENous, CONTINUOUS, Pillo Montenegro MD, Last Rate: 6.5 mL/hr at 02/10/23 0035, 0.375 mcg/kg/min at 02/10/23 0035    albuterol-ipratropium (DUO-NEB) 2.5 MG-0.5 MG/3 ML, 3 mL, Nebulization, Q4H PRN, Imelda MOLINA MD, 3 mL at 02/06/23 1539    bumetanide (BUMEX) tablet 1 mg, 1 mg, Oral, BID, Madhuri Kidney, Blank Santos MD, 1 mg at 02/10/23 0835    DOBUTamine (DOBUTREX) 500 mg/250 mL (2,000 mcg/mL) infusion, 2 mcg/kg/min, IntraVENous, CONTINUOUS, Adan Carrasco MD, Last Rate: 3.5 mL/hr at 02/10/23 0651, 2 mcg/kg/min at 02/10/23 0651    magnesium oxide (MAG-OX) tablet 400 mg, 400 mg, Oral, DAILY, Shaila, Lizette B, NP, 400 mg at 02/10/23 0835    0.9% sodium chloride infusion, 6 mL/hr, IntraVENous, CONTINUOUS, Adan Carrasco MD, Last Rate: 6 mL/hr at 02/09/23 1209, 6 mL/hr at 02/09/23 1209    0.9% sodium chloride infusion 250 mL, 250 mL, IntraVENous, PRN, Shaila, Lizette B, NP    alteplase (CATHFLO) 1 mg in sterile water (preservative free) 1 mL injection, 1 mg, InterCATHeter, PRN, Shaila, Lizette B, NP    bacitracin 500 unit/gram packet 1 Packet, 1 Packet, Topical, PRN, Shaila, Lizette B, NP    bumetanide (BUMEX) injection 1 mg, 1 mg, IntraVENous, Q4H PRN, Shaila, Lizette B, NP, 1 mg at 02/06/23 1349    epoetin heidy-epbx (RETACRIT) injection 10,000 Units, 10,000 Units, SubCUTAneous, Q7D, Jadiel Galvan MD, 10,000 Units at 02/09/23 2127    senna-docusate (PERICOLACE) 8.6-50 mg per tablet 1 Tablet, 1 Tablet, Oral, DAILY PRN, Lucinda STALEY, NP    traZODone (DESYREL) tablet 50 mg, 50 mg, Oral, QHS PRN, Nilo Khoury MD, 50 mg at 02/09/23 2051    [COMPLETED] apixaban (ELIQUIS) tablet 10 mg, 10 mg, Oral, BID, 10 mg at 02/06/23 0905 **FOLLOWED BY** apixaban (ELIQUIS) tablet 5 mg, 5 mg, Oral, BID, Shaila, Lizette B, NP, 5 mg at 02/10/23 0835    glucose chewable tablet 16 g, 4 Tablet, Oral, PRN, Ita Harvey NP    glucagon (GLUCAGEN) injection 1 mg, 1 mg, IntraMUSCular, PRN, Ita Harvey NP    dextrose 10 % infusion 0-250 mL, 0-250 mL, IntraVENous, PRN, Ita Harvey NP    insulin lispro (HUMALOG) injection, , SubCUTAneous, AC&HS, Ita Harvey NP, 2 Units at 02/10/23 0835    balsam peru-castor oiL (VENELEX) ointment, , Topical, BID, Irina Montanez MD, Given at 02/10/23 0834    lidocaine 4 % patch 1 Patch, 1 Patch, TransDERmal, Q24H, Seven Armenta MD, 1 Patch at 02/09/23 1208    amiodarone (CORDARONE) tablet 400 mg, 400 mg, Oral, BID, Natalie Maurice MD, 400 mg at 02/10/23 0491    aspirin delayed-release tablet 81 mg, 81 mg, Oral, DAILY, Heber Barajas MD, 81 mg at 02/10/23 0835    sodium chloride (NS) flush 5-40 mL, 5-40 mL, IntraVENous, Q8H, Heber Barajas MD, 10 mL at 02/10/23 0835    sodium chloride (NS) flush 5-40 mL, 5-40 mL, IntraVENous, PRN, Heber Barajas MD    acetaminophen (TYLENOL) tablet 650 mg, 650 mg, Oral, Q6H PRN, 650 mg at 02/09/23 2052 **OR** acetaminophen (TYLENOL) suppository 650 mg, 650 mg, Rectal, Q6H PRN, Heber Barajas MD    polyethylene glycol (MIRALAX) packet 17 g, 17 g, Oral, DAILY PRN, Heber Barajas MD, 17 g at 02/02/23 1539    ondansetron (ZOFRAN ODT) tablet 4 mg, 4 mg, Oral, Q8H PRN, 4 mg at 01/30/23 1357 **OR** ondansetron (ZOFRAN) injection 4 mg, 4 mg, IntraVENous, Q6H PRN, Heber Barajas MD, 4 mg at 02/03/23 1926    pantoprazole (PROTONIX) tablet 40 mg, 40 mg, Oral, ACB, Heber Barajas MD, 40 mg at 02/10/23 0835    rosuvastatin (CRESTOR) tablet 20 mg, 20 mg, Oral, QHS, Heber Barajas MD, 20 mg at 02/09/23 2126    PATIENT CARE TEAM:  Patient Care Team:  Francis Chen MD as PCP - General (Family Medicine)  Francis Chen MD as PCP - [facility]  Modesta Garcia MD (Cardiovascular Disease Physician)  Mary Beth Laurent MD (Gastroenterology)  Mounika Rodríguez MD (Cardiothoracic Surgery)  Shonna Crespo MD (Cardiovascular Disease Physician)  Yannick Beach MD (Nephrology)  René Mcintyre RN as Care Transitions Nurse  Anjum Adam MD (Pulmonary Disease)  Adriana Broussard MD ([address] [facility]     Thank you for allowing me to participate in this patient's care. Total Critical Care time spent:    40 minutes.  There was no overlap with other services        Critical care was necessary to treat or prevent imminent or life threatening deterioration of the following conditions: cardiac failure, respiratory failure and CNS failure or compromise     Services Provided:  1. Telemetry review and 12 lead ECG interpretation  2. Hemodynamic interpretation, assessment, and management  3. Review and interpretation of CXR  4. Review and interpretation of lab values  5. Review and interpretation of microbiologic data and culture results  6. Review of medications and administration  7. Review and interpretation of nutrition requirements and management  8. Discussion of management withother consultants and services  9.  Clinical update to family members      Lieutenant Rossana NP   [address] [place] [address], [address]  Phone: [phone]

## 2023-02-10 NOTE — PROGRESS NOTES
Transitions of Care Plan  RUR: 29% - high  Clinical Update: LVAD this admission - op date is 2/15  Consults: Multiple  Baseline: ambulates w RW; resides w wife  Barrier(s) to Discharge: medical  Disposition:   Home Health: [address]  DME: Ashu Maharaj - will need to return  Estimated Discharge Date: 2+ days    Clinical update per chart review and/or patient discussed during Interdisciplinary Rounds:    Patient is not medically stable for discharge due to ongoing medical needs. CM continues to follow treatment plan for medical progress and disposition needs.     Disposition:  Anticipate home health after LVAD recovery    Tara Salguero, [address]  Available via [facility] or

## 2023-02-10 NOTE — PROGRESS NOTES
Met with pt and pt. Wife and son. Pt. LVAD coordinator and doctor were present and provided family education. Pt. Son discussed setting up a schedule to support pt. Wife in caring for pt. After surgery. Pt. Was in good spirits and had no additional questions or needs. Pt. wife had questions about the course of pt. Rehab and was informed by MD that pt. Will have continuous evaluation especially by OT/PT.

## 2023-02-10 NOTE — DIABETES MGMT
[address] [place] [address]  DIABETES MANAGEMENT CONSULT    Consulted by  Belkys Tucker MD   for advanced nursing evaluation and care for inpatient blood glucose management. Evaluation and Action Plan   Minna Acosta is a 71year old gentleman, with Type 2 Diabetes with a recent A1C of 7.7%, who is admitted with arrhythmias and acute on chronic HFrEF with failure to respond to inotrope support. He was discharged one week prior from a prolonged hospital stay for acute on chronic HF and LVAD work-up and discharged home on dobutamine with a lifevest.  Glucose control was tenuous during his previous admission s/t multiple co-morbidities, several tests/procedures requiring NPO status, waxing and waining oral intake. At this time glucose is impacted by:  Heart Failure with reduced EF 25-30%  Milrinone and Dobutamine therapy  TANNER: GFR improving: Now 48  Oral intake     Last admission, he required low basal doses but high bolus doses with meals to offset pre-prandial hyperglycemia. He is experiencing pre-prandial hyperglycemia despite moderate dose bolus humalog resumed- and dose increased to high dose. Individualized BG target is closer to 180mg/dl. He has been approved for LVAD implantation as destination therapy (Tentatively 2/15) and is currently undergoing SGC-guided optimization in CV-ICU     Management Rationale Action Plan   Medication   Basal needs Using 0.1 units/kg/D Lantus 6 units daily as fasting BG below goal     Nutritional needs Using resistant sensitivity based on degree of pre-prandial hyperglycemia Continue 6 units Humalog/meal (high dose). Increased 2/8     Hold if patient is NPO or consumes less than 50% of carbohydrates on meal tray    Advance by 2 units daily for persistent   pre-prandial hyperglycemia   Corrective insulin Using normal sensitivity  Normal sensitivity ACHS     Additional orders  Diet per RD.   Will defer to them for customization of diet with malnutrition. If BG remains at goal on basal/bolus insulin- would hold basal insulin the morning of LVAD implant (2/15) and start an insulin gtt the day of surgery per protocol. Initial Presentation   Peterson Buck is a 71 y.o. male who presented to the ED 1/26/23 with lower extremity swelling and difficulty breathing. He had a prolonged hospital admission from 12/22/22-1/19/23 for acute on chronic systolic HF and LVAD work-up. He was discharged with home inotrope. LAB: , GFR 58, Trop 2003, BNP 4524  CXR: New diffuse bilateral increased interstitial markings, partially obscuring bilateral pulmonary nodules. HX:   Past Medical History:   Diagnosis Date    CAD (coronary artery disease) 11/10/2016    NSTEMI & 2 stents    Deafness 10/28/2012    DM (diabetes mellitus) ([facility]     Elevated cholesterol     Hypertension     NSTEMI (non-ST elevated myocardial infarction) ([facility] 11/10/2016        INITIAL DX:   CHF (congestive heart failure) ([facility] [I50.9]     Current Treatment     TX: Milrinone, Amio. Specialty consultations: [facility], Cardiology, EP, GI     Hospital Course   Clinical progress has been complicated by:    9/39: Admission. Rapid response for SVT- Given adenosine x2, amio bolus/gtt  1/27: Advanced HFC consult. EP consult ordered. Intolerance of inotropic support. Cardiology consult. NSTEMI, heparin gtt started. Seen by EP: Continue amio. 1/28: Febrile: CT chest 1/28 shows diffuse focal opacities concerning for pneumonia. Obtain blood cultures, UA. Pathology report 1/18/23: well differentiated neuroendocrine tumor grade 1. EGD: Patchy erythema gastric body, biopsies done. 6 mm sessile polyp gastric body biopsied  1/30: Oncology consult: Recommend multiphase CT of the abdomen and pelvis to evaluate for metastatic spread. Tachycardia and SOB, BiPAP overnight. 2/3: Accepted for VAD implant if renal fx improves per [facility].   CCU Transfer and swan placed  2/4: PRBC transfusion   2/6: With increased dyspnea. Doubtamine started  Diabetes History   Type 2 Diabetes  Ambulatory BG management provided by: PCP Lucretia Buckner MD    Diabetes-related Medical History  Acute complications  Acute hyperglycemia  Neurological complications  Peripheral neuropathy  Microvascular disease  Nephropathy  Macrovascular disease  CAD  Other associated conditions                                       CHF     Diabetes Medication History  Key Antihyperglycemic Medications        Diabetes Medication History  Key Antihyperglycemic Medications               metFORMIN (GLUCOPHAGE) 500 mg tablet (Taking) Take 1 Tablet by mouth two (2) times daily (with meals) for 30 days.     glipiZIDE (GLUCOTROL) 5 mg tablet (Taking) Take 1 tablet by mouth twice daily    Januvia 50 mg tablet (Taking) Take 1 tablet by mouth once daily             Diabetes self-management practices:   Eating pattern                Eats 3 small meals daily  [x]         Breakfast                         2 Eggs, Coffee  [x]         Lunch                               [place]  [x]         Dinner                              \"[place] Food\" Bread, rice, lentils   [x]         Bedtime                           [place]ie  [x]         Snacks                              Afternoon snack  [x]         Beverages                        Water, Coffee  Physical activity pattern                Sedentary   Monitoring pattern  Discharged last week with a Carolynn CGM and serum glucometer  7 day average Ba-9a: 129-164  9a-12: 243  Noon to 9p: 198-238  1 low BG in the last week overnight    Taking medications pattern  [x]         Consistent administration  [x]         Affordable  Social determinants of health impacting diabetes self-management practices   Concerned that you need to know more about how to stay healthy with diabetes  Overall evaluation:                 [x]         Achieving A1c target with drug therapy & self-care practices    Subjective     Objective   Physical exam  General Underweight [place] [facility] male in acute distress/ill-appearing. Neuro  Alert, oriented   Vital Signs Visit Vitals  BP (!) 119/49 (BP 1 Location: Left upper arm, BP Patient Position: At rest)   Pulse 82   Temp 97.8 °F (36.6 °C)   Resp 12   Ht 5' 6\" (1.676 m)   Wt 55.3 kg (121 lb 14.6 oz)   SpO2 97%   BMI 19.68 kg/m²     Skin  Warm and dry. No acanthosis noted along neckline. Heart   Regular rate and rhythm.  No murmurs, rubs or gallops  Lungs  Clear to auscultation without rales or rhonchi  Extremities No foot wounds        Laboratory  Recent Labs     02/10/23  0524 02/10/23  0518 23  0352 23  0815 23  0507   GLU  --  180* 189* 129* 129*  124*   AGAP  --  3* 8 8 5  9   WBC 5.8  --  5.7  --  6.5   CREA  --  1.60* 1.55* 1.54* 1.55*  1.60*   AST  --  24 18  --  22  28   ALT  --  24 16  --  20  21         Factors impacting BG management  Factor Dose Comments   Nutrition:  Standard meals     60 grams/meal      Heart Failure/NSTEMI/Ischemic cardiomyopathy/Arrhythmias  Milrinone  BNP 4879  EF 25-30%    Infection UTI/PNA  Urine Cx pending   IV antibiotics   Fevers    Other:   Kidney function TANNER on CKD:   Current GFR 44 (improving)      Blood glucose pattern    Significant diabetes-related events over the past 24-72 hours  A1C 7.7% 23  Fasting B/167/118/84/85/190  Pre-prandial: 204-225  Basal: Lantus 6 units daily  Bolus: 10 units Humalog/meal  Correction: 5 units in the last 24h  Eating well- drinking supplements/milk       Assessment and Nursing Intervention   Nursing Diagnosis Risk for unstable blood glucose pattern   Nursing Intervention Domain 5631 Decision-making Support   Nursing Interventions Examined current inpatient diabetes/blood glucose control   Explored factors facilitating and impeding inpatient management  Explored corrective strategies with patient and responsible inpatient provider   Informed patient of rational for insulin strategy while hospitalized Billing Code(s)   No charge    Before making these care recommendations, I personally reviewed the hospitalization record, including notes, laboratory & diagnostic data and current medications, and examined the patient at the bedside (circumstances permitting) before determining care. More than fifty (50) percent of the time was spent in patient counseling and/or care coordination.   Total minutes: 10    SLICK Rhodes  Diabetes Clinical Nurse Specialist  [facility]  Access via DocSea

## 2023-02-10 NOTE — PROGRESS NOTES
Problem: Mobility Impaired (Adult and Pediatric)  Goal: *Acute Goals and Plan of Care (Insert Text)  Description: FUNCTIONAL STATUS PRIOR TO ADMISSION: Patient was modified independent using a rollator for functional mobility. Pt recently d/c home after previous hospital stay. Able to ambulate community distances. HOME SUPPORT PRIOR TO ADMISSION: The patient lived with spouse but did not require assist.    Physical Therapy Goals  Initiated 1/28/2023-- Goals appropriate and add #6 2/6/2023  1. Patient will move from supine to sit and sit to supine  in bed with modified independence within 7 day(s). 2.  Patient will transfer from bed to chair and chair to bed with modified independence using the least restrictive device within 7 day(s). 3.  Patient will perform sit to stand with modified independence within 7 day(s). 4.  Patient will ambulate with modified independence for 300 feet with the least restrictive device within 7 day(s). 5.  Patient will ascend/descend 12 stairs with 1 handrail(s) with supervision/set-up within 7 day(s). 6.  Patient will verbally and functionally recall move in the tube techniques in prep for possible LVAD within 7 days. Outcome: Progressing Towards Goal       PHYSICAL THERAPY TREATMENT  Patient: Catherine Cardoza (76 y.o. male)  Date: 2/10/2023  Diagnosis: CHF (congestive heart failure) ([facility] [I50.9] <principal problem not specified>  Procedure(s) (LRB):  LEFT VENTRICULAR ASSIST DEVICE HEARTMATE 3 PERMANENT (Left)    Precautions: Fall  Chart, physical therapy assessment, plan of care and goals were reviewed. ASSESSMENT  Patient continues with skilled PT services and is progressing towards goals. Pt able to increase gait tolerance. Pt had no LOB with the use of rollator.  Pt can ambulate with nursing over the weekend      Current Level of Function Impacting Discharge (mobility/balance): SBA    Other factors to consider for discharge: LVAD placement next week         PLAN :  Patient continues to benefit from skilled intervention to address the above impairments. Continue treatment per established plan of care. to address goals. Recommendation for discharge: (in order for the patient to meet his/her long term goals)  To be determined: post LVAD    This discharge recommendation:  Has not yet been discussed the attending provider and/or case management    IF patient discharges home will need the following DME: patient owns DME required for discharge       SUBJECTIVE:   Patient stated I am okay.     OBJECTIVE DATA SUMMARY:   Critical Behavior:  Neurologic State: Alert  Orientation Level: Oriented X4  Cognition: Appropriate decision making, Appropriate for age attention/concentration, Appropriate safety awareness, Follows commands  Safety/Judgement: Awareness of environment, Fall prevention, Insight into deficits  Functional Mobility Training:  Bed Mobility:                    Transfers:  Sit to Stand: Modified independent  Stand to Sit: Modified independent                             Balance:  Sitting: Intact  Standing: Intact; With support  Ambulation/Gait Training:  Distance (ft): 800 Feet (ft)  Assistive Device: Gait belt;Walker, rollator  Ambulation - Level of Assistance: Stand-by assistance        Gait Abnormalities: Decreased step clearance        Base of Support: Narrowed        Step Length: Right shortened;Left shortened                    Stairs: Therapeutic Exercises:     Pain Rating:  No  complaints     Activity Tolerance:   Fair    After treatment patient left in no apparent distress:   Sitting in chair and Call bell within reach    COMMUNICATION/COLLABORATION:   The patients plan of care was discussed with: Registered nurse.      Yumiko Rosas PTA   Time Calculation: 28 mins

## 2023-02-10 NOTE — WOUND CARE
WOCN Note:     Re-consulted for right heel. Chart shows:  Admitted on 12/22/22. Admitted for sepsis, cardiogenic shock, respiratory failure. History of MI, CABG x3, DM, CHF. Assessment:   Sitting up in recliner in CVICU   Conversational and reports heel pain at night; reports using pillow to offload heel. Boots also available. left heel intact and without erythema. Some dry skin plaques. 1. POA right heel deep tissue injury  3 x 3 x 0 cm  Area if non-blanching deep purple. Some softening of tissue due to foam dressing. Removed foam dressing and painted the heel with betadine. No purulence or redness  Heel offloaded with pillows on recliner footboard. Wound Recommendations:    Venelex twice daily sacrum  Paint right heel with betadine daily and allow to air dry; use boot when in bed    PI Prevention:  Turn/reposition approximately every 2 hours  Offload heels with heels hanging off end of pillow at all times while in bed. Sacral Foam dressing: lift to assess regularly; change as needed. Discontinue if incontinence is frequently soiling dressing. Low Air Loss mattress: Use only flat sheet and one incontinence pad. No changes to relay to provider.      Transition of Care: Plan to follow weekly and as needed while admitted to hospital.      ROSY Sherman, RN, [facility]  Certified Wound, Ostomy, Continence Nurse  office [phone]  Available via [address] [place] [address]

## 2023-02-10 NOTE — PROGRESS NOTES
0800 Bedside shift change report given to [address] (oncoming nurse) by Ting Ornelas RN (offgoing nurse). Report included the following information SBAR, Kardex, ED Summary, Procedure Summary, Intake/Output, MAR, Accordion, Recent Results, and Cardiac Rhythm NSR . MAC removed by nightshift RN- CVP transducing through PICC. 0930 Patient complaining of some constipation. PRN miralax and pericolace administered- See MAR. 21  PT working with patient- able to ambulate with walker in hallway. Tolerated well. 0 Dr Lisandro Duval at bedside for family meeting with patient, wife and son regarding LVAD implantation. 1930 Bedside shift change report given to [address] (oncoming nurse) by Raza Stoner RN (offgoing nurse). Report included the following information SBAR, Kardex, ED Summary, Procedure Summary, Intake/Output, MAR, Accordion, Recent Results, and Cardiac Rhythm NSR .

## 2023-02-10 NOTE — PROGRESS NOTES
CRITICAL CARE ADMISSION NOTE      Name: Anna Webber   : 1950   MRN: [id number]   Date: 2/10/2023      Reason for ICU Admission: Acute on chronic HFrEF, LVAD workup     ICU PROBLEM LIST   Acute on chronic HFrEF (20%) NYHA IIIb/IV (D) on home inotropic support, life vest  TANNER on CKD3  Aflutter w/ RVR  Acute on chronic hypoxemic respiratory failure  CAP  CAD  Gastric neuroendocrine tumor  Hx of LUE DVT  DM  PAD  TRISTAN  BPH w/ urinary retention requiring chronic donnelly    HISTORY OF PRESENT ILLNESS:   Pt is a 71 y.o M w/ PMH as noted above admitted to [facility] on  due to ongoing symptoms secondary to his HFrEF. Treated for possible CAP, and diuresed for acute on chronic HFrEF. Nephrology following for TANNER on CKD 3. AHF team recommended transfer to CVICU for [facility] placement and ongoing LVAD workup with plan for placement 2/15.     24 HOUR EVENTS:   Doing well this AM on dual inotropes, ambulating through unit w/ PT, net neg 1.5L    NEUROLOGICAL:    Mentation appropriate  Delirium precautions  Encourage work w/ PT/OT    PULMONOLOGY:   O2 per NC PRN for spO2 >92%    CARDIOVASCULAR:   Scheduled and PRN bumex  Goal net negative  C/w milrinone, dobutamine  Telemetry, close hemodynamic monitoring  Unable to tolerate BB, ARNI/ARB due to , aldactone held 2/2 elevated K   C/w ASA, amiodarone, statin  Will need Life Vest replaced  Ongoing LVAD workup by AHF team, plan for LVAD placement 2/15  Monitor for signs of hypoperfusion, monitor UOP    GASTROINTESTINAL:   PPI  Cardiac, renal, diabetic diet as tolerated      RENAL/ELECTROLYTE/FLUIDS:   Strict I/Os  Nephrology following  Monitor RFP  Mg/Phos/K >2/3/4  Donnelly to remain in place  PRN bumex    ENDOCRINE:   SSI, Basal insulin  Hypoglycemic protocol  Glucose goal 120-180    HEMATOLOGY/ONCOLOGY:   On eliquis  EPO weekly  Gastric neuroendocrine tumor, well differentiated, good prognosis per onc.    Not barrier to LVAD implant    ID/MICRO:   S/p CAP treatment     ICU DAILY CHECKLIST     Code Status:Full  DVT Prophylaxis:Eliquis  T/L/D: PIV,  Vasquez  SUP: PPI  Diet: ADAT  Activity Level:ad jose f  ABCDEF Bundle/Checklist Completed:Yes  Disposition: Stay in ICU, likely TTF in AM  Multidisciplinary Rounds Completed:  yes  Patient/Family Updated: Yes    Tatiana   2/3 Transferred to CVICU for [facility] placement  2/7 started on dobutamine as adjunct to milrinone    SUBJECTIVE:     As noted above    Review of Systems:     Review of Systems   Constitutional:  Positive for malaise/fatigue. Negative for chills and fever. HENT:  Negative for congestion and sinus pain. Eyes:  Negative for blurred vision, double vision and pain. Respiratory:  Positive for shortness of breath (exertional, improved). Negative for hemoptysis and sputum production. Cardiovascular:  Negative for chest pain, palpitations and leg swelling. Gastrointestinal:  Negative for abdominal pain, nausea and vomiting. Genitourinary:  Negative for dysuria, frequency and urgency. Musculoskeletal:  Negative for back pain, joint pain and myalgias. Skin:  Negative for itching and rash. Neurological:  Negative for tremors, sensory change, speech change and focal weakness. Psychiatric/Behavioral:  Negative for hallucinations. The patient is not nervous/anxious.        OBJECTIVE:     Labs and Data: Reviewed 02/10/23  Medications: Reviewed 02/10/23  Imaging: Reviewed 02/10/23    General - chronically ill, sarcopenia, OOBTC  HEENT- pupils equal, EOMI, anicteric  Neuro - moves all extrem, follows basic commands, no focal deficit  CV - RRR, systolic murmur  Resp - rales b/l, NC  Abd - soft, nontender, no guarding  MSK- intact, no sacral ulcer      Visit Vitals  BP (!) 105/55 (BP 1 Location: Left upper arm, BP Patient Position: At rest)   Pulse 76   Temp 97.8 °F (36.6 °C)   Resp 23   Ht 5' 6\" (1.676 m) Comment: per wife   Wt 55.3 kg (121 lb 14.6 oz)   SpO2 98%   BMI 19.68 kg/m²    O2 Flow Rate (L/min): 2 l/min O2 Device: Nasal cannula Temp (24hrs), Av.8 °F (36.6 °C), Min:97.3 °F (36.3 °C), Max:98.1 °F (36.7 °C)    CVP (mmHg): 4 mmHg (02/10/23 0800)      Intake/Output:     Intake/Output Summary (Last 24 hours) at 2/10/2023 1048  Last data filed at 2/10/2023 0800  Gross per 24 hour   Intake 838.55 ml   Output 2300 ml   Net -1461.45 ml         Imaging    23    ECHO ADULT FOLLOW-UP OR LIMITED 2023    Interpretation Summary    Left Ventricle: EF by visual approximation is 20%. Left ventricle is mildly dilated. Mitral Valve: Moderately thickened leaflet, at the anterior and posterior leaflets. Moderately calcified leaflet. Moderate regurgitation. Left Atrium: Left atrium is moderately dilated. Technical qualifiers: Echo study was technically difficult with poor endocardial visualization. Contrast used: Definity. Limited study, not all structures viewed    Signed by: Fe Stewart MD on 2023  4:39 PM       Pertinent imaging reviewed and interpreted as noted above    CRITICAL CARE DOCUMENTATION  I had a face to face encounter with the patient, reviewed and interpreted patient data including clinical events, labs, images, vital signs, I/O's, and examined patient. I have discussed the case and the plan and management of the patient's care with the consulting services, the bedside nurses and the respiratory therapist.      NOTE OF PERSONAL INVOLVEMENT IN CARE   This patient has a high probability of imminent, clinically significant deterioration, which requires the highest level of preparedness to intervene urgently. I participated in the decision-making and personally managed or directed the management of the following life and organ supporting interventions that required my frequent assessment to treat or prevent imminent deterioration. I personally spent 35 minutes of critical care time.   This is time spent at this critically ill patient's bedside actively involved in patient care as well as the coordination of care. This does not include any procedural time which has been billed separately. Walker Waggoner MD  Staff [facility]

## 2023-02-11 NOTE — PROGRESS NOTES
2000: Bedside and Verbal shift change report given to [address] (oncoming nurse) by Harshal Lopez RN (offgoing nurse). Report included the following information SBAR, Intake/Output, MAR, Recent Results, Cardiac Rhythm NSR, and Alarm Parameters . 2220: Pt , recheck showed , then . Sliding scale given appropriately     0800: Bedside and Verbal shift change report given to Lexie RN (oncoming nurse) by Aidan Gonzalez RN (offgoing nurse). Report included the following information SBAR, Intake/Output, MAR, Recent Results, Cardiac Rhythm NSR, and Alarm Parameters .      Shift Summary: uneventful shift, pt slept, medicated pain appropriately, linens changed, pt bathed and weighed, labs drawn

## 2023-02-11 NOTE — PROGRESS NOTES
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 Star Lassiter  Patient presented with symptomatic SVT (a-flutter) with RVR converted to sinus tach after amio loading; in the setting of pneumonia and likely intolerance to inotropes  Patient completed remainder of evaluation for LVAD for destination therapy while in-patient and treatment of pneumonia; during LVAD eval, found to have well-differentiated neuroendocrine tumor on gastric body and gastric polyp, G1, per oncology treatable tumor with good prognosis and would not preclude LVAD.   Patient was approved for LVAD implantation as destination therapy at [facility] 2/3/23 with tentative plans to proceed to OR on 2/15/23; currently undergoing SGC-guided optimization in CV-ICU   The following have been identified as relative concerns pertaining to LVAD candidacy: small body surface area, cachexia, BMI 18, malnutrition, frailty, advanced age, muscular deconditioning, PVD (fingertips), urinary retention requiring donnelly catheter, neuroendocrine tumor class 1 requiring treatment after LVAD, heal injury requiring wound care consult, mild neurocognitive dysfunction, chronic kidney disease and hearing loss requiring hearing aid       RECOMMENDATIONS:  Continue milrinone 0.375 mcg/kg/min- will stop milrinone on Tuesday morning   Continue Dobutamine 2mcg/kg/min  No beta-blockers due to hypotension and RV conditioning prior to LVAD  Cannot tolerate ARB/ARNi due to hypotension and upcoming surgical procedure   Cannot tolerate spironolactone due to hyperkalemia  Cannot tolerate jardiance due to significant diuresis on smallest dose/contributed to IVVD  Discontinued corlanor due to SVT  Digoxin stopped d/t risk for toxicity with amio loading  Continue amiodarone, appreciate EP cardiology recs  Continue small dose of bumex daily   Appreciate nephrology recommendations   Continue Mag ox 400 mg daily   Keep K+ >4 and Mg >2  Continue eliquis 5mg twice daily- will stop Eliquis on Monday .  No bridging per Dr. Murrell Dates   S/p Venofer 200mg x 2 doses  Check Iron profile on 2/13  Transfuse to keep hgb closer to 8 for potential surgical procedure  Abx/treatment of suspected PNA per hospitalist  Continue lifevest  Continue baby aspirin   Plavix previously stopped, okayed by Dr. Grimes Friends consult pending (high risk for TRISTAN)  Heel pressure ulcer- wound care consulted  VAD coordinator to provide education  Focus on physical therapy and ambulate daily  Incentive spirometry and wean oxygen [place]  Nutritionist consultation   consultation and daily support; help with hearing aids  Appreciate Palliative care recommendations   Tentative OR date 2/15/23  Will give 1 dose Vit K on Tuesday 2/14  Will place lines in OR  Remove PICC line on Monday, will use PIV for inotropes for 24hours   Will need blood products and antibiotics ordered on Monday 2/13  Will have consents signed on Monday     All other care per primary team      INTERVAL HISTORY:  Feels great  Ambulated twice in outside the unit  Hemodynamics stable   Milrinone 0.375  Dobutamine 2mcg  I/O net negative 1.3 liters  2 liters O2  Hgb stable 9.2  Cr stable at 1.62  Weaning down O2  Ambulating and eating well   Repeat [place] improved       IMPRESSION:  Acute on chronic combined systolic/diastolic  heart failure  Stage D, NYHA class IIIB/IV symptoms   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, LVEF 25-30% and 23% (by cMRI 1/3/23)  Cardiac MRI suggestive of ischemic cardiomyopathy  PYP equivocal  Chronic milrinone infusion as palliation   Coronary artery disease  CAD s/p CABG x 2: further disease best managed medically due to small vessel size   At risk of sudden cardiac death  Peripheral arterial disease  Bilateral hydronephrosis s/p donnelly  Cardiac risk factors:  HTN  HL  TRISTAN, STOP-BANG 4  DM2  CKD, stage 3  MOCA from 2/9 19/30, consistent with mild cognitive impairment  Hard of hearing  Gastric Neuroendocrine Tumor, elevated gastrin and chromogranin A  Sepsis, unclear source- resolved           LIFE GOALS:  Lifestyle goals reviewed with the patient. Patient's personal goals include: having a few more years with family  Important upcoming milestones or family events: None at this time   The patient identifies the following as important for living well: being at home, not being SOB              CARDIAC IMAGING:  Echo 1/27/23    Left Ventricle: EF by visual approximation is 20%. Left ventricle is mildly dilated. Mitral Valve: Moderately thickened leaflet, at the anterior and posterior leaflets. Moderately calcified leaflet. Moderate regurgitation. Left Atrium: Left atrium is moderately dilated. Technical qualifiers: Echo study was technically difficult with poor endocardial visualization. Contrast used: Definity. Limited study, not all structures viewed     Echo 1/9/23   Left Ventricle: Severely reduced left ventricular systolic function with a visually estimated EF of 25 - 30%. Left ventricle size is normal. Mildly increased wall thickness. Echo 12/26/22    Left Ventricle: Severely reduced left ventricular systolic function with a visually estimated EF of 25 - 30%. Left ventricle size is normal. Normal wall thickness. There are regional wall motion abnormalities.  Grade II diastolic dysfunction with increased LAP.    Right Ventricle: Moderately reduced systolic function. TAPSE is abnormal. TAPSE is 1.1 cm. Aortic Valve: Mild stenosis of the aortic valve. AV peak gradient is 13 mmHg. AV peak velocity is 1.8 m/s. Mitral Valve: Not well visualized. Moderate annular calcification at the posterior leaflet of the mitral valve. Mild to moderate regurgitation. Tricuspid Valve: Mildly elevated RVSP. Left Atrium: Left atrium is moderately dilated. 12/8/22    Left Ventricle: Moderately reduced left ventricular systolic function with a visually estimated EF of 35 - 40%. Severe hypokinesis of the following segments: mid anteroseptal, apical anterior, apical septal, apical inferior and apical lateral. Severe hypokinesis of the apex. Mitral Valve: Severely thickened leaflet, at the anterior and posterior leaflets. Severely calcified leaflet, at the anterior and posterior leaflets. Mild annular calcification of the mitral valve. Moderate regurgitation. Left Atrium: Left atrium is mildly dilated. Contrast used: Definity. limited study     EKG 12/22/22 ST, Biatria enlargement, marked ST abnormality     C 12/6/22  1. Normal LVEDP  2. Severe native multivessel coronary artery disease  3. Patent LIMA to LAD and vein graft to distal RCA  4. Recurrent ISR in OM1 stent with now 60 to 70% restenosis  5. Recoil of left main and circumflex stent with now recurrent 40 to 50% stenosis. 6.  Progression of ostial left main disease now to about 60% stenosis  7. Progression of disease in jailed first marginal branch now with diffuse 90% stenosis  8.   High-grade stenosis in the mid to distal right potential femoral artery treated with 6 x 40 mm impact drug-coated balloon angioplasty to reduce the stenosis to less than 40%     NST        HEMODYNAMICS:  RHC 1/9/23  PA 20/9, RA 3, PCWP 8, CI 1.8     CPEST too ill   6MW 300 feet        HPI:  71 y.o. male who was admitted via ED for worsening SOB, poor appetite, orthopnea and fatigue. Pmhx of CAD s/p CABG, PAD, DM 2, recent admission for HFmrEF earlier this month. Serial TTEs show progressive decline in EF since 3/2021 with the most recent EF at 25-30%. Recent LHC shows diffuse CAD and no interventions indicated. Patient had Ivania Latin recently and there is some thought to myocarditis or other etiology causing worsening HF. [facility] was consulted for further evaluation and management of HFrEF. PHYSICAL EXAM:  Visit Vitals  BP (!) 116/58 (BP 1 Location: Left upper arm, BP Patient Position: At rest)   Pulse 75   Temp 97.7 °F (36.5 °C)   Resp 13   Ht 5' 6\" (1.676 m) Comment: per wife   Wt 122 lb 5.7 oz (55.5 kg)   SpO2 97%   BMI 19.75 kg/m²     General: Patient is well developed, well-nourished in no acute distress  HEENT: Unremarkable   Neck: Supple. No evidence of thyroid enlargements or lymphadenopathy. JVD: Cannot be appreciated   Lungs: Breath sounds are equal and clear bilaterally. No wheezes, rhonchi, or rales. Heart: Regular rate and rhythm with normal S1 and S2. No murmurs, gallops or rubs. Abdomen: Soft, no mass or tenderness. No organomegaly or hernia. Bowel sounds present. Genitourinary and rectal: deferred  Extremities: No cyanosis, clubbing, or edema. Neurologic: No focal sensory or motor deficits are noted. Grossly intact. Psychiatric: Awake, alert an doriented x 3. Appropriate mood and affect. Skin: Warm, dry and well perfused. REVIEW OF SYSTEMS:  General: Denies fever. Ear, nose and throat: Denies difficulty hearing, sinus problems, nosebleeds  Cardiovascular: see above in the interval history  Respiratory: Denies cough, wheezing, sputum production, hemoptysis.   Gastrointestinal: Denies heartburn, constipation, diarrhea, abdominal pain, nausea, blood in stool  Kidney and bladder: Denies painful urination, frequent urination  Musculoskeletal: Denies joint pain, muscle weakness  Skin and hair: Denies change in existing skin lesions    PAST MEDICAL HISTORY:  Past Medical History:   Diagnosis Date    CAD (coronary artery disease) 11/10/2016    NSTEMI & 2 stents    Deafness 10/28/2012    DM (diabetes mellitus) ([facility] [address]     Elevated cholesterol     Hypertension     NSTEMI (non-ST elevated myocardial infarction) ([facility] [address] 11/10/2016       PAST SURGICAL HISTORY:  Past Surgical History:   Procedure Laterality Date    COLONOSCOPY N/A 6/28/2018    COLONOSCOPY performed by Kit Sandifer, MD at [facility] ENDOSCOPY    COLONOSCOPY N/A 1/18/2023    COLONOSCOPY performed by Derrick Uribe MD at [facility] ENDOSCOPY    [address]  11/11/2016    2 stents       FAMILY HISTORY:  Family History   Problem Relation Age of Onset    Heart Disease Father     Heart Attack Father     Hypertension Mother     Elevated Lipids Brother     Elevated Lipids Brother     No Known Problems Sister     Elevated Lipids Brother     No Known Problems Son     No Known Problems Daughter     Anesth Problems Neg Hx        SOCIAL HISTORY:  Social History     Socioeconomic History    Marital status:    Tobacco Use    Smoking status: Never     Passive exposure: Never    Smokeless tobacco: Never   Vaping Use    Vaping Use: Never used   Substance and Sexual Activity    Alcohol use:  Yes     Alcohol/week: 2.0 standard drinks     Types: 1 Cans of beer, 1 Shots of liquor per week     Comment: rarely    Drug use: No    Sexual activity: Yes     Social Determinants of Health     Financial Resource Strain: Medium Risk    Difficulty of Paying Living Expenses: Somewhat hard   Food Insecurity: Food Insecurity Present    Worried About Running Out of Food in the Last Year: Never true    Ran Out of Food in the Last Year: Often true       LABORATORY RESULTS:     Labs Latest Ref Rng & Units 2/11/2023 2/10/2023 2/9/2023 2/8/2023 2/8/2023 2/8/2023 2/7/2023   WBC 4.1 - 11.1 K/uL 4.9 5.8 5.7 - - 6.5 6.9   RBC 4.10 - 5.70 M/uL 3.57(L) 3.77(L) 3.56(L) - - 3.52(L) 3.37(L)   Hemoglobin 12.1 - 17.0 g/dL 9.2(L) 9.5(L) 8. 9(L) - - 8. 9(L) 8. 3(L)   Hematocrit 36.6 - 50.3 % 30. 0(L) 32. 5(L) 30. 4(L) - - 29. 4(L) 28. 4(L)   MCV 80.0 - 99.0 FL 84.0 86.2 85.4 - - 83.5 84.3   Platelets 890 - 086 K/uL 198 192 177 - - 193 202   Lymphocytes 12 - 49 % 22 - 19 - - 14 16   Monocytes 5 - 13 % 8 - 10 - - 9 9   Eosinophils 0 - 7 % 5 - 4 - - 3 1   Basophils 0 - 1 % 1 - 0 - - 1 1   Albumin 3.5 - 5.0 g/dL 2. 9(L) 3.0(L) 2. 8(L) - 3. 1(L) 3. 1(L) 3. 1(L)   Calcium 8.5 - 10.1 MG/DL 9.0 8.9 9.0 9.3 8.9 9.1 8.9   Glucose 65 - 100 mg/dL 179(H) 180(H) 189(H) 129(H) 129(H) 124(H) 79   BUN 6 - 20 MG/DL 30(H) 29(H) 31(H) 31(H) 31(H) 32(H) 31(H)   Creatinine 0.70 - 1.30 MG/DL 1.62(H) 1.60(H) 1.55(H) 1.54(H) 1.55(H) 1.60(H) 1.66(H)   Sodium 136 - 145 mmol/L 133(L) 134(L) 133(L) 136 134(L) 136 137   Potassium 3.5 - 5.1 mmol/L 4.0 4.5 4.4 4.0 4.2 4.1 4.0   TSH 0.36 - 3.74 uIU/mL - - - - - - -   PSA 0.01 - 4.0 ng/mL - - - - - - -   LDH 85 - 241 U/L - - - - - - 401(H)   Some recent data might be hidden     Lab Results   Component Value Date/Time    TSH 3.52 01/28/2023 05:26 AM    TSH 2.12 12/27/2022 02:36 PM    TSH 4.80 (H) 12/06/2022 03:53 AM    TSH 5.39 (H) 10/12/2022 09:10 AM    TSH 3.53 02/03/2022 11:47 AM    TSH 5.790 (H) 11/21/2019 04:45 PM    TSH 3.08 06/22/2018 01:53 PM    TSH 4.250 05/26/2015 09:43 AM       ALLERGY:  No Known Allergies     CURRENT MEDICATIONS:    Current Facility-Administered Medications:     insulin lispro (HUMALOG) injection 8 Units, 8 Units, SubCUTAneous, TID WITH MEALS, Cathy Sheldon, CNS, 8 Units at 02/10/23 1712    insulin glargine (LANTUS) injection 6 Units, 6 Units, SubCUTAneous, DAILY, Cathy Sheldon, CNS, 6 Units at 02/10/23 0834    milrinone (PRIMACOR) 20 MG/100 ML D5W infusion, 0.375 mcg/kg/min, IntraVENous, CONTINUOUS, Nasir Nichols MD, Last Rate: 6.5 mL/hr at 02/11/23 0748, 0.375 mcg/kg/min at 02/11/23 0748    albuterol-ipratropium (DUO-NEB) 2.5 MG-0.5 MG/3 ML, 3 mL, Nebulization, Q4H PRN, aWlker Whitten MD, 3 mL at 02/06/23 1539    bumetanide (BUMEX) tablet 1 mg, 1 mg, Oral, BID, Parker Motley MD, 1 mg at 02/10/23 1703    DOBUTamine (DOBUTREX) 500 mg/250 mL (2,000 mcg/mL) infusion, 2 mcg/kg/min, IntraVENous, CONTINUOUS, Parker Motley MD, Last Rate: 3.5 mL/hr at 02/11/23 0747, 2 mcg/kg/min at 02/11/23 0747    magnesium oxide (MAG-OX) tablet 400 mg, 400 mg, Oral, DAILY, Shaila, Lizette B, NP, 400 mg at 02/10/23 0835    0.9% sodium chloride infusion, 6 mL/hr, IntraVENous, CONTINUOUS, Parker Motley MD, Last Rate: 3 mL/hr at 02/11/23 0440, 3 mL/hr at 02/11/23 0440    0.9% sodium chloride infusion 250 mL, 250 mL, IntraVENous, PRN, Shaila, Lizette B, NP    alteplase (CATHFLO) 1 mg in sterile water (preservative free) 1 mL injection, 1 mg, InterCATHeter, PRN, Shaila, Lizette B, NP    bacitracin 500 unit/gram packet 1 Packet, 1 Packet, Topical, PRN, Shaila, Lizette B, NP    bumetanide (BUMEX) injection 1 mg, 1 mg, IntraVENous, Q4H PRN, Shaila, Lizette B, NP, 1 mg at 02/06/23 1349    epoetin heidy-epbx (RETACRIT) injection 10,000 Units, 10,000 Units, SubCUTAneous, Q7D, Jadiel Galvan MD, 10,000 Units at 02/09/23 2127    senna-docusate (PERICOLACE) 8.6-50 mg per tablet 1 Tablet, 1 Tablet, Oral, DAILY PRN, Alicia Res B, NP, 1 Tablet at 02/10/23 0662    traZODone (DESYREL) tablet 50 mg, 50 mg, Oral, QHS PRN, Ivin Buerger, MD, 50 mg at 02/10/23 2237    [COMPLETED] apixaban (ELIQUIS) tablet 10 mg, 10 mg, Oral, BID, 10 mg at 02/06/23 0905 **FOLLOWED BY** apixaban (ELIQUIS) tablet 5 mg, 5 mg, Oral, BID, Lizette Linton NP, 5 mg at 02/10/23 1703    glucose chewable tablet 16 g, 4 Tablet, Oral, PRN, Ita Harvey NP    glucagon (GLUCAGEN) injection 1 mg, 1 mg, IntraMUSCular, PRN, Ita Harvey NP    dextrose 10 % infusion 0-250 mL, 0-250 mL, IntraVENous, PRN, Ita Harvey NP    insulin lispro (HUMALOG) injection, , SubCUTAneous, AC&HS, [place] Phlegm, NP, 2 Units at 02/10/23 9606 balsam peru-castor oiL (VENELEX) ointment, , Topical, BID, Shaggy King MD, Given at 02/10/23 1708    lidocaine 4 % patch 1 Patch, 1 Patch, TransDERmal, Q24H, Bette Osorio MD, 1 Patch at 02/10/23 1201    amiodarone (CORDARONE) tablet 400 mg, 400 mg, Oral, BID, Sabrina Morris MD, 400 mg at 02/10/23 1703    aspirin delayed-release tablet 81 mg, 81 mg, Oral, DAILY, Heber Braajas MD, 81 mg at 02/10/23 0835    sodium chloride (NS) flush 5-40 mL, 5-40 mL, IntraVENous, Q8H, Heber Barajas MD, 10 mL at 02/11/23 0636    sodium chloride (NS) flush 5-40 mL, 5-40 mL, IntraVENous, PRN, Heber Barajas MD    acetaminophen (TYLENOL) tablet 650 mg, 650 mg, Oral, Q6H PRN, 650 mg at 02/10/23 2023 **OR** acetaminophen (TYLENOL) suppository 650 mg, 650 mg, Rectal, Q6H PRN, Heber Barajas MD    polyethylene glycol (MIRALAX) packet 17 g, 17 g, Oral, DAILY PRN, Heber Barajas MD, 17 g at 02/10/23 0938    ondansetron (ZOFRAN ODT) tablet 4 mg, 4 mg, Oral, Q8H PRN, 4 mg at 01/30/23 1357 **OR** ondansetron (ZOFRAN) injection 4 mg, 4 mg, IntraVENous, Q6H PRN, Heber Barajas MD, 4 mg at 02/03/23 1926    pantoprazole (PROTONIX) tablet 40 mg, 40 mg, Oral, ACB, Heber Barajas MD, 40 mg at 02/11/23 4452    rosuvastatin (CRESTOR) tablet 20 mg, 20 mg, Oral, QHS, Heber Barajas MD, 20 mg at 02/10/23 3942    PATIENT CARE TEAM:  Patient Care Team:  Jim Oseguera MD as PCP - General (Family Medicine)  Jim Oseguera MD as PCP - [facility]  Dawne Baumgarten, MD (Cardiovascular Disease Physician)  Severo Foot, MD (Gastroenterology)  Iva Saavedra MD (Cardiothoracic Surgery)  Edwin Olivares MD (Cardiovascular Disease Physician)  Silvia Mas MD (Nephrology)  Annalisa Banda RN as Care Transitions Nurse  Marija Raza MD (Pulmonary Disease)  Zia Lee MD ([address] [place] [address] Vascular Surgery)     Thank you for allowing me to participate in this patient's care.     Leonel Lane MD [address], [place] [address]  Phone: [phone]

## 2023-02-11 NOTE — PROGRESS NOTES
[facility] in [place], [place]    Met with patient, patient's son and wife at bedside. Reviewed LVAD equipment with mock loop with patient's son. Sterile dressing change- Discussed driveline sterile dressing change. Discussed need for dressing change daily for the first 30 days and 3 times a week after, unless otherwise indicated by LVAD team. Informed family dressing change education should be set up by family with inpatient nurses for daily education. Explained at least on family member needs to be fully checked off on dressing change prior to patient discharge and encouraged them to set up education with inpatient RN as soon as possible after implant once patient stable. Power Sources- Discussed MPU and batteries. Educated patient and family that patient to use MPU at night when sleeping and any other time there is a chance of sleep. Discussed changing MPU batteries with daylight savings time. Discussed batteries and battery charger. Back Up Emergency Equipment- Reviewed LVAD back up emergency bag and contents (Extra set of batteries, battery clips and back up ). Discussed bag accompanying patient at all times to ensure back up equipment available in case of emergency, even when patient is in the hospital.     Exchange- Reviewed  exchange and reason for exchanging controller. Educated patient, wife and son that  controller exchange should only be done under direction of the LVAD team, and in the event of an emergent controller exchange LVAD team member will be walking caregiver through each step via phone. Patient completed pre-implant intermacs     Time given to ask questions. Patient and family had no further questions at this time. Informed them will reach out to set up additional training. They verbalized understanding and had no further questions.      Camille Garcia RN  VAD Coordinator

## 2023-02-11 NOTE — PROGRESS NOTES
[facility]    ICU TEAM Progress Note    Name: Roselia Cee   : 1950   MRN: [id number]   Date: 2023      I  Subjective:   Progress Note: 2023      Reason for ICU Admission: Acute on chronic HFrEF, LVAD workup      Interval history:  Pt is a 71 y.o M w/ PMH as noted above admitted to [facility] on  due to ongoing symptoms secondary to his HFrEF. Treated for possible CAP, and diuresed for acute on chronic HFrEF. Nephrology following for TANNER on CKD 3. AHF team recommended transfer to CVICU for [facility] placement and ongoing LVAD workup with plan for placement 2/15. Overnight Events:   No major issues    Active Problem List:     Problem List  Date Reviewed: 2023            Codes Class    Dyslipidemia, goal LDL below 70 ICD-10-CM: E78.5  ICD-9-CM: 272.4         PAD (peripheral artery disease) ([facility]) ICD-10-CM: I73.9  ICD-9-CM: 140.2         Systolic CHF, acute on chronic (HCC) ICD-10-CM: I50.23  ICD-9-CM: 428.23, 428.0         Receiving inotropic medication ICD-10-CM: Z79.899  ICD-9-CM: V49.89         CHF (congestive heart failure) ([facility]) ICD-10-CM: I50.9  ICD-9-CM: 428.0         CKD stage 3 due to type 2 diabetes mellitus ([facility]) ICD-10-CM: E11.22, N18.30  ICD-9-CM: 250.40, 585.3         S/P CABG x 3 ICD-10-CM: Z95.1  ICD-9-CM: V45.81     Overview Signed 2018 11:20 AM by SCAR Laboy     Coronary Artery Bypass Grafting x 3, LIMA to LAD, RSVG to OM, RSVG to RCA  Right GSV EVH             Coronary artery disease involving native coronary artery of native heart without angina pectoris ICD-10-CM: I25.10  ICD-9-CM: 414.01         Hypertension, essential ICD-10-CM: I10  ICD-9-CM: 401.9         Colon cancer screening ICD-10-CM: Z12.11  ICD-9-CM: V76.51         Nonrheumatic aortic valve stenosis ICD-10-CM: I35.0  ICD-9-CM: 424.1         Bruit of right carotid artery ICD-10-CM: R09.89  ICD-9-CM: 785. 9         Type 2 diabetes mellitus with hyperglycemia (HCC) ICD-10-CM: E11.65  ICD-9-CM: 250.00         Deafness ICD-10-CM: H91.90  ICD-9-CM: 389.9         Cramp of limb ICD-10-CM: R25.2  ICD-9-CM: 729.82            Past Medical History:      has a past medical history of CAD (coronary artery disease) (11/10/2016), Deafness (10/28/2012), DM (diabetes mellitus) ([facility] Elevated cholesterol, Hypertension, and NSTEMI (non-ST elevated myocardial infarction) ([facility] (11/10/2016). Past Surgical History:      has a past surgical history that includes hx appendectomy; pr unlisted procedure cardiac surgery (11/11/2016); colonoscopy (N/A, 6/28/2018); and colonoscopy (N/A, 1/18/2023). Home Medications:     Prior to Admission medications    Medication Sig Start Date End Date Taking? Authorizing Provider   polyethylene glycol (Miralax) 17 gram/dose powder Take 17 g by mouth daily as needed for Constipation. Yes Provider, Historical   furosemide (LASIX) 20 mg tablet Take 1 Tablet by mouth daily as needed (for weight greater than 120 lb by 2-3 lb or shortness of breath). 1/23/23  Yes Gomez Linton, NP   milrinone (PRIMACOR) 20 mg/100 mL infusion 0.2 mcg/kg/min by IntraVENous route continuous. infuse at 0.2mcg/kg/min with dosage weight of 52kgs continuously   Yes Yaquelin Cavanaugh MD   finasteride (PROSCAR) 5 mg tablet Take 1 Tablet by mouth daily (with dinner) for 30 days. 1/19/23 2/18/23 Yes Nicho Ndiaye MD   pantoprazole (PROTONIX) 40 mg tablet Take 1 Tablet by mouth Daily (before breakfast) for 30 days. 1/20/23 2/19/23 Yes Nicho Ndiaye MD   metFORMIN (GLUCOPHAGE) 500 mg tablet Take 1 Tablet by mouth two (2) times daily (with meals) for 30 days. 1/19/23 2/18/23 Yes Nicho Ndiaye MD   glipiZIDE (GLUCOTROL) 5 mg tablet Take 1 tablet by mouth twice daily 12/2/22  Yes Wyatt Camejo MD   ipratropium (ATROVENT) 21 mcg (0.03 %) nasal spray 2 Sprays by Both Nostrils route every twelve (12) hours.  11/21/22  Yes Wyatt Camejo MD   ergocalciferol (ERGOCALCIFEROL) 1,250 mcg (50,000 unit) capsule Take 1 Capsule by mouth every seven (7) days. Patient taking differently: Take 50,000 Units by mouth every seven (7) days. Mondays 10/14/22  Yes Chad Hill MD   Januvia 50 mg tablet Take 1 tablet by mouth once daily 22  Yes Chad Hill MD   rosuvastatin (CRESTOR) 20 mg tablet Take 1 Tablet by mouth nightly. 22  Yes Berry Lombard, MD   aspirin delayed-release 81 mg tablet Take 81 mg by mouth daily. Yes Provider, Bob   zolpidem (AMBIEN) 5 mg tablet Take 1 Tablet by mouth nightly as needed for Sleep. Max Daily Amount: 5 mg. 23   Chad Hill MD   nitroglycerin (NITROSTAT) 0.4 mg SL tablet 1 Tablet by SubLINGual route every five (5) minutes as needed for Chest Pain. Up to 3 doses. 22   Igor Gonzalez MD       Allergies/Social/Family History:     No Known Allergies   Social History     Tobacco Use    Smoking status: Never     Passive exposure: Never    Smokeless tobacco: Never   Substance Use Topics    Alcohol use:  Yes     Alcohol/week: 2.0 standard drinks     Types: 1 Cans of beer, 1 Shots of liquor per week     Comment: rarely      Family History   Problem Relation Age of Onset    Heart Disease Father     Heart Attack Father     Hypertension Mother     Elevated Lipids Brother     Elevated Lipids Brother     No Known Problems Sister     Elevated Lipids Brother     No Known Problems Son     No Known Problems Daughter     Anesth Problems Neg Hx        Review of Systems:     I reviewed 10 system and they are negative but as in interval history    Objective:   Vital Signs:  Visit Vitals  /65   Pulse 79   Temp 97.3 °F (36.3 °C)   Resp 24   Ht 5' 6\" (1.676 m) Comment: per wife   Wt 55.5 kg (122 lb 5.7 oz)   SpO2 97%   BMI 19.75 kg/m²    O2 Flow Rate (L/min): 2 l/min O2 Device: None (Room air) Temp (24hrs), Av.8 °F (36.6 °C), Min:97.3 °F (36.3 °C), Max:98.4 °F (36.9 °C)    CVP (mmHg): 1 mmHg (23 1000)      Intake/Output:     Intake/Output Summary (Last 24 hours) at 2/11/2023 1226  Last data filed at 2/11/2023 0800  Gross per 24 hour   Intake 512.1 ml   Output 1700 ml   Net -1187.9 ml       Physical Exam:    General - chronically ill, sarcopenia, OOBTC  HEENT- pupils equal, EOMI, anicteric  Neuro - moves all extrem, follows basic commands, no focal deficit  CV - RRR, systolic murmur  Resp - rales b/l, NC  Abd - soft, nontender, no guarding  MSK- intact, no sacral ulcer    LABS AND  DATA: Personally reviewed  Recent Labs     02/11/23  0431 02/10/23  0524   WBC 4.9 5.8   HGB 9.2* 9.5*   HCT 30.0* 32.5*    192     Recent Labs     02/11/23  0431 02/10/23  0518   * 134*   K 4.0 4.5   CL 99 99   CO2 31 32   BUN 30* 29*   CREA 1.62* 1.60*   * 180*   CA 9.0 8.9   MG 2.4 2.3   PHOS 3.4 2.8     Recent Labs     02/11/23  0431 02/10/23  0518   AP 99 113   TP 6.4 6.1*   ALB 2.9* 3.0*   GLOB 3.5 3.1     Recent Labs     02/09/23  0352   INR 1.4*   PTP 14.1*      No results for input(s): PHI, PCO2I, PO2I, FIO2I in the last 72 hours. No results for input(s): CPK, CKMB, TROIQ, BNPP in the last 72 hours.     Hemodynamics:   PAP: PAP Systolic: 41 (69/32/81 9724) CO: CO (l/min): 4.9 l/min (02/07/23 0800)   Wedge: PAOP (PCWP) (mmhg): 36 mmHg (02/03/23 1830) CI: CI (l/min/m2): 2.3 l/min/m2 (02/07/23 0800)   CVP:  CVP (mmHg): 1 mmHg (02/11/23 1000) SVR:       PVR:       Ventilator Settings:  Mode Rate Tidal Volume Pressure FiO2 PEEP            30 %       Peak airway pressure:      Minute ventilation: 9.8 l/min        MEDS: Reviewed    Chest X-Ray:  CXR Results  (Last 48 hours)      None            Assessment and Plan:   Acute on chronic HFrEF (20%) NYHA IIIb/IV (D) on home inotropic support, life vest  TANNER on CKD3  Aflutter w/ RVR  Acute on chronic hypoxemic respiratory failure  CAP  CAD  Gastric neuroendocrine tumor  Hx of LUE DVT  DM  PAD  TRISTAN  BPH w/ urinary retention requiring chronic donnelly    NEUROLOGICAL:    Mentation appropriate  Delirium precautions  Encourage work w/ PT/OT     PULMONOLOGY:   O2 per NC PRN for spO2 >92%     CARDIOVASCULAR:   Scheduled and PRN bumex  Goal net negative  C/w milrinone [plan to discontinue on Tuesday morning], dobutamine  Telemetry, close hemodynamic monitoring  Unable to tolerate BB, ARNI/ARB due to , aldactone held 2/2 elevated K   C/w ASA, amiodarone, statin  Ongoing LVAD workup by AHF team, plan for LVAD placement 2/15  Monitor for signs of hypoperfusion, monitor UOP     GASTROINTESTINAL:   PPI  Cardiac, renal, diabetic diet as tolerated             RENAL/ELECTROLYTE/FLUIDS:   Strict I/Os  Nephrology following  Monitor RFP  Mg/Phos/K >2/3/4  Vasquez to remain in place  PRN bumex     ENDOCRINE:   SSI, Basal insulin  Hypoglycemic protocol  Glucose goal 120-180     HEMATOLOGY/ONCOLOGY:   On eliquis  EPO weekly  Gastric neuroendocrine tumor, well differentiated, good prognosis per onc. Not barrier to LVAD implant     ID/MICRO:   S/p CAP treatment          DISPOSITION  Stay in ICU    CRITICAL CARE CONSULTANT NOTE  I had a face to face encounter with the patient, reviewed and interpreted patient data including clinical events, labs, images, vital signs, I/O's, and examined patient. I have discussed the case and the plan and management of the patient's care with the consulting services, the bedside nurses and the respiratory therapist.      NOTE OF PERSONAL INVOLVEMENT IN CARE   This patient has a high probability of imminent, clinically significant deterioration, which requires the highest level of preparedness to intervene urgently. I participated in the decision-making and personally managed or directed the management of the following life and organ supporting interventions that required my frequent assessment to treat or prevent imminent deterioration. Bette Palacios M.D.   Staff Intensivist/Pulmonologist  [facility]  2/11/2023

## 2023-02-11 NOTE — PROGRESS NOTES
RENAL  PROGRESS NOTE        Subjective:   Sitting in chair  Objective:   VITALS SIGNS:    Visit Vitals  /65   Pulse 79   Temp 97.3 °F (36.3 °C)   Resp 24   Ht 5' 6\" (1.676 m) Comment: per wife   Wt 55.5 kg (122 lb 5.7 oz)   SpO2 97%   BMI 19.75 kg/m²       O2 Device: None (Room air)   O2 Flow Rate (L/min): 2 l/min   Temp (24hrs), Av.8 °F (36.6 °C), Min:97.3 °F (36.3 °C), Max:98.4 °F (36.9 °C)         PHYSICAL EXAM:  NAD  Alert/oriented  No rales  Rrr  No edema    DATA REVIEW:     INTAKE / OUTPUT:   Last shift:      701 - 1900  In: 40 [I.V.:40]  Out: -   Last 3 shifts:  190 -  0700  In: 1272.1 [P.O.:720; I.V.:552.1]  Out: 3500 [Urine:3500]    Intake/Output Summary (Last 24 hours) at 2023 1457  Last data filed at 2023 0800  Gross per 24 hour   Intake 512.1 ml   Output 1700 ml   Net -1187.9 ml           LABS:   Recent Labs     23  0431 02/10/23  0524 23  0352   WBC 4.9 5.8 5.7   HGB 9.2* 9.5* 8.9*   HCT 30.0* 32.5* 30.4*    192 177       Recent Labs     23  0431 02/10/23  0518 23  0352   * 134* 133*   K 4.0 4.5 4.4   CL 99 99 98   CO2 31 32 27   * 180* 189*   BUN 30* 29* 31*   CREA 1.62* 1.60* 1.55*   CA 9.0 8.9 9.0   MG 2.4 2.3 2.2   PHOS 3.4 2.8 2.6   ALB 2.9* 3.0* 2.8*   TBILI 0.6 0.5 0.6   ALT 25 24 16   INR  --   --  1.4*           Assessment:  TANNER on CKD-3a: Suspect cardiorenal effects with needed diuresis. Hypotension/poor renal perfusion likely culprit. Hyperkalemia: better     Ischemic CM: Recurrent exacerbations. EF 20%. On continuous Milrinone since last admission.       BPH: s/p donnelly     Well differentiated NET Grade 1: noted on EGD 23     Anemia 2 to CKD:Hgb sub-optimal     Hyponatremia: mild-> better       Left UE basilic and radial vein thrombus     Plan/Recommendations:  Creatinine  stable   LVAD Monday    BRIGHT -redose  Diuretics as per cardiology  Will fu Monday, call if problems arise on       Desire Dev Shipman MD

## 2023-02-11 NOTE — PROGRESS NOTES
0800: Assumed care of pt . Assessment done. Pt up in chair and in great mood no pain at this time. 19 Son here to visit. 1700: Pt not interested in eating dinner now. Walked with Nurse and son into CVSU two laps then back to the unit. Pt now in recliner with his feet up. 1900 Pt ATE his own food. Bedside and Verbal shift change report given to Keyla Murphy (oncoming nurse) by Marianela Carroll RN (offgoing nurse). Report included the following information Intake/Output, MAR, and Cardiac Rhythm NSR .

## 2023-02-12 NOTE — PROGRESS NOTES
0800- Bedside report received. Drips verified. Assessment performed. [phone]- Patient ambulated around entirety of CVSU and CVICU then back to room. 2000- Bedside and Verbal shift change report given to EM RN (oncoming nurse) by Juli Robles RN (offgoing nurse). Report given with SBAR, Kardex, Intake/Output and MAR.

## 2023-02-12 NOTE — PROGRESS NOTES
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 Yamilka Barakat  Patient presented with symptomatic SVT (a-flutter) with RVR converted to sinus tach after amio loading; in the setting of pneumonia and likely intolerance to inotropes  Patient completed remainder of evaluation for LVAD for destination therapy while in-patient and treatment of pneumonia; during LVAD eval, found to have well-differentiated neuroendocrine tumor on gastric body and gastric polyp, G1, per oncology treatable tumor with good prognosis and would not preclude LVAD.   Patient was approved for LVAD implantation as destination therapy at [facility] 2/3/23 with tentative plans to proceed to OR on 2/15/23; currently undergoing SGC-guided optimization in CV-ICU   The following have been identified as relative concerns pertaining to LVAD candidacy: small body surface area, cachexia, BMI 18, malnutrition, frailty, advanced age, muscular deconditioning, PVD (fingertips), urinary retention requiring donnelly catheter, neuroendocrine tumor class 1 requiring treatment after LVAD, heal injury requiring wound care consult, mild neurocognitive dysfunction, chronic kidney disease and hearing loss requiring hearing aid       RECOMMENDATIONS:  Continue milrinone 0.375 mcg/kg/min- will stop milrinone on Tuesday morning   Continue Dobutamine 2mcg/kg/min  Re-dosed inotropes to current weight 54.7kg  No beta-blockers due to hypotension and RV conditioning prior to LVAD  Cannot tolerate ARB/ARNi due to hypotension and upcoming surgical procedure   Cannot tolerate spironolactone due to hyperkalemia  Cannot tolerate jardiance due to significant diuresis on smallest dose/contributed to IVVD  Discontinued corlanor due to SVT  Digoxin stopped d/t risk for toxicity with amio loading  Continue amiodarone, appreciate EP cardiology recs  Hold diuretics, give 250cc NS IV, check orthostatics  Appreciate nephrology recommendations   Continue Mag ox 400 mg daily   Keep K+ >4 and Mg >2  Continue eliquis 5mg twice daily- will stop Eliquis on Monday .  No bridging per Dr. Stephanie Lemus 200mg x 2 doses  Transfuse to keep hgb closer to 8 for potential surgical procedure  Abx/treatment of suspected PNA per hospitalist  Continue lifevest  Continue baby aspirin   Plavix previously stopped, okayed by Dr. Jacqui Purvis consult pending (high risk for TRISTAN)  Heel pressure ulcer- wound care consulted  VAD coordinator to provide education  Focus on physical therapy and ambulate daily  Incentive spirometry and wean oxygen NC  Nutritionist consultation   consultation and daily support; help with hearing aids  Appreciate Palliative care recommendations   Tentative OR date 2/15/23  Will give 1 dose Vit K on Tuesday 2/14  Will place lines in OR  Removed PICC line on Monday, will use PIV for inotropes for 24hours   Will need blood products and antibiotics ordered on Monday 2/13  Will have consents signed on Monday  Family meeting with wife, son, patient, VAD coordinator and SW held on Friday re: anticipated perioperative course and risks and expectations of living with LVAD and complications; all questions answered to satisfaction  Ambulate daily and nutrition daily     All other care per primary team      INTERVAL HISTORY:  Feels great  Ambulated twice in outside the unit  Hemodynamics stable Milrinone 0.375  Dobutamine 2mcg  I/O net negative 1.96 liters  2 liters O2  Hgb stable 9.3  Cr stable at 1.6  Weaning down O2  Ambulating and eating well   Repeat Haskell improved   Iron sat 17%, ferritin 274      IMPRESSION:  Acute on chronic combined systolic/diastolic  heart failure  Stage D, NYHA class IV symptoms   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, LVEF 20% (by echo 1/2023) and 23% (by cMRI 1/3/23)  Cardiac MRI suggestive of ischemic cardiomyopathy  PYP equivocal  Chronic milrinone and dobutamine infusion as palliation   Coronary artery disease  CAD s/p CABG x 2: further disease best managed medically due to small vessel size   At risk of sudden cardiac death  Peripheral arterial disease  Bilateral hydronephrosis s/p andrzej  Cardiac risk factors:  HTN  HL  TRISTAN, STOP-BANG 4  DM2  CKD, stage 3  MOCA from 2/9 19/30, consistent with mild cognitive impairment  Hard of hearing  Gastric Neuroendocrine Tumor, elevated gastrin and chromogranin A  Sepsis, unclear source- resolved         LIFE GOALS:  Lifestyle goals reviewed with the patient. Patient's personal goals include: having a few more years with family  Important upcoming milestones or family events: None at this time   The patient identifies the following as important for living well: being at home, not being SOB              CARDIAC IMAGING:  Echo 1/27/23    Left Ventricle: EF by visual approximation is 20%. Left ventricle is mildly dilated. Mitral Valve: Moderately thickened leaflet, at the anterior and posterior leaflets. Moderately calcified leaflet. Moderate regurgitation. Left Atrium: Left atrium is moderately dilated. Technical qualifiers: Echo study was technically difficult with poor endocardial visualization. Contrast used: Definity. Limited study, not all structures viewed     Echo 1/9/23   Left Ventricle: Severely reduced left ventricular systolic function with a visually estimated EF of 25 - 30%.  Left ventricle size is normal. Mildly increased wall thickness. Echo 12/26/22    Left Ventricle: Severely reduced left ventricular systolic function with a visually estimated EF of 25 - 30%. Left ventricle size is normal. Normal wall thickness. There are regional wall motion abnormalities. Grade II diastolic dysfunction with increased LAP. Right Ventricle: Moderately reduced systolic function. TAPSE is abnormal. TAPSE is 1.1 cm. Aortic Valve: Mild stenosis of the aortic valve. AV peak gradient is 13 mmHg. AV peak velocity is 1.8 m/s. Mitral Valve: Not well visualized. Moderate annular calcification at the posterior leaflet of the mitral valve. Mild to moderate regurgitation. Tricuspid Valve: Mildly elevated RVSP. Left Atrium: Left atrium is moderately dilated. 12/8/22    Left Ventricle: Moderately reduced left ventricular systolic function with a visually estimated EF of 35 - 40%. Severe hypokinesis of the following segments: mid anteroseptal, apical anterior, apical septal, apical inferior and apical lateral. Severe hypokinesis of the apex. Mitral Valve: Severely thickened leaflet, at the anterior and posterior leaflets. Severely calcified leaflet, at the anterior and posterior leaflets. Mild annular calcification of the mitral valve. Moderate regurgitation. Left Atrium: Left atrium is mildly dilated. Contrast used: Definity. limited study     EKG 12/22/22 ST, Biatria enlargement, marked ST abnormality     C 12/6/22  1. Normal LVEDP  2. Severe native multivessel coronary artery disease  3. Patent LIMA to LAD and vein graft to distal RCA  4. Recurrent ISR in OM1 stent with now 60 to 70% restenosis  5. Recoil of left main and circumflex stent with now recurrent 40 to 50% stenosis. 6.  Progression of ostial left main disease now to about 60% stenosis  7. Progression of disease in jailed first marginal branch now with diffuse 90% stenosis  8.   High-grade stenosis in the mid to distal right potential femoral artery treated with 6 x 40 mm impact drug-coated balloon angioplasty to reduce the stenosis to less than 40%        HEMODYNAMICS:  RHC 1/9/23  PA 20/9, RA 3, PCWP 8, CI 1.8     CPEST too ill   6MW 300 feet     PHYSICAL EXAM:  Visit Vitals  BP (!) 112/55 (BP 1 Location: Left upper arm, BP Patient Position: At rest)   Pulse 76   Temp 97.6 °F (36.4 °C)   Resp 13   Ht 5' 6\" (1.676 m) Comment: per wife   Wt 120 lb 8 oz (54.7 kg)   SpO2 95%   BMI 19.45 kg/m²     General: Patient is well developed, well-nourished in no acute distress  HEENT: Unremarkable   Neck: Supple. No evidence of thyroid enlargements or lymphadenopathy. JVD: Cannot be appreciated   Lungs: Breath sounds are equal and clear bilaterally. No wheezes, rhonchi, or rales. Heart: Regular rate and rhythm with normal S1 and S2. No murmurs, gallops or rubs. Abdomen: Soft, no mass or tenderness. No organomegaly or hernia. Bowel sounds present. Genitourinary and rectal: deferred  Extremities: No cyanosis, clubbing, or edema. Neurologic: No focal sensory or motor deficits are noted. Grossly intact. Psychiatric: Awake, alert an doriented x 3. Appropriate mood and affect. Skin: Warm, dry and well perfused. REVIEW OF SYSTEMS:  General: Denies fever. Ear, nose and throat: Denies difficulty hearing, sinus problems, nosebleeds  Cardiovascular: see above in the interval history  Respiratory: Denies cough, wheezing, sputum production, hemoptysis.   Gastrointestinal: Denies heartburn, constipation, diarrhea, abdominal pain, nausea, blood in stool  Kidney and bladder: Denies painful urination, frequent urination  Musculoskeletal: Denies joint pain, muscle weakness  Skin and hair: Denies change in existing skin lesions    PAST MEDICAL HISTORY:  Past Medical History:   Diagnosis Date    CAD (coronary artery disease) 11/10/2016    NSTEMI & 2 stents    Deafness 10/28/2012    DM (diabetes mellitus) ([facility] [address]     Elevated cholesterol Hypertension     NSTEMI (non-ST elevated myocardial infarction) ([facility] [address] 11/10/2016       PAST SURGICAL HISTORY:  Past Surgical History:   Procedure Laterality Date    COLONOSCOPY N/A 6/28/2018    COLONOSCOPY performed by Lela Jerez MD at [facility] ENDOSCOPY    COLONOSCOPY N/A 1/18/2023    COLONOSCOPY performed by Damion Layne MD at [facility] ENDOSCOPY    [address]  11/11/2016    2 stents       FAMILY HISTORY:  Family History   Problem Relation Age of Onset    Heart Disease Father     Heart Attack Father     Hypertension Mother     Elevated Lipids Brother     Elevated Lipids Brother     No Known Problems Sister     Elevated Lipids Brother     No Known Problems Son     No Known Problems Daughter     Anesth Problems Neg Hx        SOCIAL HISTORY:  Social History     Socioeconomic History    Marital status:    Tobacco Use    Smoking status: Never     Passive exposure: Never    Smokeless tobacco: Never   Vaping Use    Vaping Use: Never used   Substance and Sexual Activity    Alcohol use: Yes     Alcohol/week: 2.0 standard drinks     Types: 1 Cans of beer, 1 Shots of liquor per week     Comment: rarely    Drug use: No    Sexual activity: Yes     Social Determinants of Health     Financial Resource Strain: Medium Risk    Difficulty of Paying Living Expenses: Somewhat hard   Food Insecurity: Food Insecurity Present    Worried About Running Out of Food in the Last Year: Never true    Ran Out of Food in the Last Year: Often true       LABORATORY RESULTS:     Labs Latest Ref Rng & Units 2/12/2023 2/11/2023 2/10/2023 2/9/2023 2/8/2023 2/8/2023 2/8/2023   WBC 4.1 - 11.1 K/uL 5.1 4.9 5.8 5.7 - - 6.5   RBC 4.10 - 5.70 M/uL 3.71(L) 3.57(L) 3.77(L) 3.56(L) - - 3.52(L)   Hemoglobin 12.1 - 17.0 g/dL 9.3(L) 9.2(L) 9.5(L) 8. 9(L) - - 8. 9(L)   Hematocrit 36.6 - 50.3 % 32. 2(L) 30. 0(L) 32. 5(L) 30. 4(L) - - 29. 4(L)   MCV 80.0 - 99.0 FL 86.8 84.0 86.2 85.4 - - 83.5   Platelets 649 - 351 K/uL 211 198 192 177 - - 193   Lymphocytes 12 - 49 % 20 22 - 19 - - 14   Monocytes 5 - 13 % 8 8 - 10 - - 9   Eosinophils 0 - 7 % 5 5 - 4 - - 3   Basophils 0 - 1 % 1 1 - 0 - - 1   Albumin 3.5 - 5.0 g/dL 2. 9(L) 2. 9(L) 3.0(L) 2. 8(L) - 3. 1(L) 3. 1(L)   Calcium 8.5 - 10.1 MG/DL 9.3 9.0 8.9 9.0 9.3 8.9 9.1   Glucose 65 - 100 mg/dL 132(H) 179(H) 180(H) 189(H) 129(H) 129(H) 124(H)   BUN 6 - 20 MG/DL 28(H) 30(H) 29(H) 31(H) 31(H) 31(H) 32(H)   Creatinine 0.70 - 1.30 MG/DL 1.60(H) 1.62(H) 1.60(H) 1.55(H) 1.54(H) 1.55(H) 1.60(H)   Sodium 136 - 145 mmol/L 135(L) 133(L) 134(L) 133(L) 136 134(L) 136   Potassium 3.5 - 5.1 mmol/L 4.3 4.0 4.5 4.4 4.0 4.2 4.1   TSH 0.36 - 3.74 uIU/mL - - - - - - -   PSA 0.01 - 4.0 ng/mL - - - - - - -   LDH 85 - 241 U/L - - - - - - -   Some recent data might be hidden     Lab Results   Component Value Date/Time    TSH 3.52 01/28/2023 05:26 AM    TSH 2.12 12/27/2022 02:36 PM    TSH 4.80 (H) 12/06/2022 03:53 AM    TSH 5.39 (H) 10/12/2022 09:10 AM    TSH 3.53 02/03/2022 11:47 AM    TSH 5.790 (H) 11/21/2019 04:45 PM    TSH 3.08 06/22/2018 01:53 PM    TSH 4.250 05/26/2015 09:43 AM       ALLERGY:  Allergies   Allergen Reactions    Ambien [Zolpidem] Other (comments)     Causes extreme confusion        CURRENT MEDICATIONS:    Current Facility-Administered Medications:     epoetin heidy-epbx (RETACRIT) injection 10,000 Units, 10,000 Units, SubCUTAneous, Q7D, Desire Dodd MD, 10,000 Units at 02/11/23 1630    insulin lispro (HUMALOG) injection 8 Units, 8 Units, SubCUTAneous, TID WITH MEALS, Cathy Sheldon CNS, 8 Units at 02/12/23 0834    insulin glargine (LANTUS) injection 6 Units, 6 Units, SubCUTAneous, DAILY, Cathy Sheldon CNS, 6 Units at 02/12/23 0834    milrinone (PRIMACOR) 20 MG/100 ML D5W infusion, 0.375 mcg/kg/min, IntraVENous, CONTINUOUS, Veronica Bonilla MD, Last Rate: 6.5 mL/hr at 02/12/23 0747, 0.375 mcg/kg/min at 02/12/23 0747    albuterol-ipratropium (DUO-NEB) 2.5 MG-0.5 MG/3 ML, 3 mL, Nebulization, Q4H PRN, Dre Salguero MD, 3 mL at 02/06/23 1539    bumetanide (BUMEX) tablet 1 mg, 1 mg, Oral, BID, Geoffrey Keyes MD, 1 mg at 02/11/23 1858    DOBUTamine (DOBUTREX) 500 mg/250 mL (2,000 mcg/mL) infusion, 2 mcg/kg/min, IntraVENous, CONTINUOUS, Geoffrey Keyes MD, Last Rate: 3.5 mL/hr at 02/12/23 0747, 2 mcg/kg/min at 02/12/23 0747    magnesium oxide (MAG-OX) tablet 400 mg, 400 mg, Oral, DAILY, Shaila, Lizette B, NP, 400 mg at 02/12/23 0835    0.9% sodium chloride infusion, 6 mL/hr, IntraVENous, CONTINUOUS, Geoffrey Keyes MD, Last Rate: 3 mL/hr at 02/11/23 0440, 3 mL/hr at 02/11/23 0440    0.9% sodium chloride infusion 250 mL, 250 mL, IntraVENous, PRN, Shaila, Lizette B, NP    alteplase (CATHFLO) 1 mg in sterile water (preservative free) 1 mL injection, 1 mg, InterCATHeter, PRN, Shaila, Lizette B, NP    bacitracin 500 unit/gram packet 1 Packet, 1 Packet, Topical, PRN, Shaila, Lizette B, NP    bumetanide (BUMEX) injection 1 mg, 1 mg, IntraVENous, Q4H PRN, Shaila, Lizette B, NP, 1 mg at 02/06/23 1349    senna-docusate (PERICOLACE) 8.6-50 mg per tablet 1 Tablet, 1 Tablet, Oral, DAILY PRN, Cari STALEY NP, 1 Tablet at 02/12/23 0732    traZODone (DESYREL) tablet 50 mg, 50 mg, Oral, QHS PRN, Conchis Brewer MD, 50 mg at 02/11/23 2054    [COMPLETED] apixaban (ELIQUIS) tablet 10 mg, 10 mg, Oral, BID, 10 mg at 02/06/23 0905 **FOLLOWED BY** apixaban (ELIQUIS) tablet 5 mg, 5 mg, Oral, BID, Shaila, Lizette B, NP, 5 mg at 02/12/23 0835    glucose chewable tablet 16 g, 4 Tablet, Oral, PRN, Ita Harvey NP    glucagon (GLUCAGEN) injection 1 mg, 1 mg, IntraMUSCular, PRN, Ita Harvey NP    dextrose 10 % infusion 0-250 mL, 0-250 mL, IntraVENous, PRN, Ita Harvey NP    insulin lispro (HUMALOG) injection, , SubCUTAneous, AC&HS, Ita Harvey NP, 5 Units at 02/11/23 1649    balsam peru-castor oiL (VENELEX) ointment, , Topical, BID, Dre Salguero MD, Given at 02/11/23 1045 lidocaine 4 % patch 1 Patch, 1 Patch, TransDERmal, Q24H, Evelyn Khoury MD, 1 Patch at 02/10/23 1201    amiodarone (CORDARONE) tablet 400 mg, 400 mg, Oral, BID, Brady Hall MD, 400 mg at 02/12/23 9769    aspirin delayed-release tablet 81 mg, 81 mg, Oral, DAILY, Heber Barajas MD, 81 mg at 02/12/23 0835    sodium chloride (NS) flush 5-40 mL, 5-40 mL, IntraVENous, Q8H, Heber Barajas MD, 10 mL at 02/12/23 0733    sodium chloride (NS) flush 5-40 mL, 5-40 mL, IntraVENous, PRN, Heber Barajas MD    acetaminophen (TYLENOL) tablet 650 mg, 650 mg, Oral, Q6H PRN, 650 mg at 02/11/23 2054 **OR** acetaminophen (TYLENOL) suppository 650 mg, 650 mg, Rectal, Q6H PRN, Heber Barajas MD    polyethylene glycol (MIRALAX) packet 17 g, 17 g, Oral, DAILY PRN, Heber Barajas MD, 17 g at 02/12/23 0732    ondansetron (ZOFRAN ODT) tablet 4 mg, 4 mg, Oral, Q8H PRN, 4 mg at 01/30/23 1357 **OR** ondansetron (ZOFRAN) injection 4 mg, 4 mg, IntraVENous, Q6H PRN, Heber Barajas MD, 4 mg at 02/03/23 1926    pantoprazole (PROTONIX) tablet 40 mg, 40 mg, Oral, ACB, Heber Barajas MD, 40 mg at 02/12/23 0732    rosuvastatin (CRESTOR) tablet 20 mg, 20 mg, Oral, QHS, Heber Barajas MD, 20 mg at 02/11/23 2101    PATIENT CARE TEAM:  Patient Care Team:  Chad Hill MD as PCP - General (Family Medicine)  Chad Hill MD as PCP - [facility]  Igor Gonzalez MD (Cardiovascular Disease Physician)  Latoya Latham MD (Gastroenterology)  Cuauhtemoc Diggs MD (Cardiothoracic Surgery)  Berry Lombard, MD (Cardiovascular Disease Physician)  Bibiana Buitrago MD (Nephrology)  Deepthi Thurman RN as Care Transitions Nurse  Paolo Velazquez MD (Pulmonary Disease)  Kary Beaver MD ([address] Vascular Surgery)     Thank you for allowing me to participate in this patient's care.     Leon Willis MD   [address] [place] [address], [address]  Phone: [phone]

## 2023-02-12 NOTE — PROGRESS NOTES
2000- Bedside and Verbal shift change report given to Sergo Jackson (oncoming nurse) by Eddy Essex (offgoing nurse). Report included the following information SBAR, Intake/Output, Recent Results, Cardiac Rhythm SR, and Alarm Parameters . Shift summary- Patient slept from 2200- 0600, woke occasionally with coughing or by staff. Denies pain, vitals stable, drips unchanged. 0800- Bedside and Verbal shift change report given to [facility] (oncoming nurse) by Sergo Jackson (offgoing nurse). Report included the following information SBAR, Intake/Output, MAR, Recent Results, Cardiac Rhythm SR, and Alarm Parameters .

## 2023-02-12 NOTE — PROGRESS NOTES
[facility]    ICU TEAM Progress Note    Name: Sammie Schmitz   : 1950   MRN: [id number]   Date: 2023      I  Subjective:   Progress Note: 2023      Reason for ICU Admission: Acute on chronic HFrEF, LVAD workup       Interval history:  Pt is a 71 y.o M w/ PMH as noted above admitted to [facility] on  due to ongoing symptoms secondary to his HFrEF. Treated for possible CAP, and diuresed for acute on chronic HFrEF. Nephrology following for TANNER on CKD 3. AHF team recommended transfer to CVICU for [facility] placement and ongoing LVAD workup with plan for placement 2/15. Overnight Events:   No major issues      Active Problem List:     Problem List  Date Reviewed: 2023            Codes Class    Dyslipidemia, goal LDL below 70 ICD-10-CM: E78.5  ICD-9-CM: 272.4         PAD (peripheral artery disease) ([facility]) ICD-10-CM: I73.9  ICD-9-CM: 715.1         Systolic CHF, acute on chronic (HCC) ICD-10-CM: I50.23  ICD-9-CM: 428.23, 428.0         Receiving inotropic medication ICD-10-CM: Z79.899  ICD-9-CM: V49.89         CHF (congestive heart failure) ([facility]) ICD-10-CM: I50.9  ICD-9-CM: 428.0         CKD stage 3 due to type 2 diabetes mellitus ([facility]) ICD-10-CM: E11.22, N18.30  ICD-9-CM: 250.40, 585.3         S/P CABG x 3 ICD-10-CM: Z95.1  ICD-9-CM: V45.81     Overview Signed 2018 11:20 AM by SCAR Wynn     Coronary Artery Bypass Grafting x 3, LIMA to LAD, RSVG to OM, RSVG to RCA  Right GSV EVH             Coronary artery disease involving native coronary artery of native heart without angina pectoris ICD-10-CM: I25.10  ICD-9-CM: 414.01         Hypertension, essential ICD-10-CM: I10  ICD-9-CM: 401.9         Colon cancer screening ICD-10-CM: Z12.11  ICD-9-CM: V76.51         Nonrheumatic aortic valve stenosis ICD-10-CM: I35.0  ICD-9-CM: 424.1         Bruit of right carotid artery ICD-10-CM: R09.89  ICD-9-CM: 785. 9         Type 2 diabetes mellitus with hyperglycemia ([facility] [address] ICD-10-CM: E11.65  ICD-9-CM: 250.00         Deafness ICD-10-CM: H91.90  ICD-9-CM: 389.9         Cramp of limb ICD-10-CM: R25.2  ICD-9-CM: 729.82            Past Medical History:      has a past medical history of CAD (coronary artery disease) (11/10/2016), Deafness (10/28/2012), DM (diabetes mellitus) ([facility] [address] Elevated cholesterol, Hypertension, and NSTEMI (non-ST elevated myocardial infarction) ([facility] [address] (11/10/2016). Past Surgical History:      has a past surgical history that includes hx appendectomy; pr unlisted procedure cardiac surgery (11/11/2016); colonoscopy (N/A, 6/28/2018); and colonoscopy (N/A, 1/18/2023). Home Medications:     Prior to Admission medications    Medication Sig Start Date End Date Taking? Authorizing Provider   polyethylene glycol (Miralax) 17 gram/dose powder Take 17 g by mouth daily as needed for Constipation. Yes Provider, Historical   furosemide (LASIX) 20 mg tablet Take 1 Tablet by mouth daily as needed (for weight greater than 120 lb by 2-3 lb or shortness of breath). 1/23/23  Yes Napoleon Linton, NP   milrinone (PRIMACOR) 20 mg/100 mL infusion 0.2 mcg/kg/min by IntraVENous route continuous. infuse at 0.2mcg/kg/min with dosage weight of 52kgs continuously   Yes Ariadna Rene MD   finasteride (PROSCAR) 5 mg tablet Take 1 Tablet by mouth daily (with dinner) for 30 days. 1/19/23 2/18/23 Yes Silvio Ndiaye MD   pantoprazole (PROTONIX) 40 mg tablet Take 1 Tablet by mouth Daily (before breakfast) for 30 days. 1/20/23 2/19/23 Yes Silvio Ndiaye MD   metFORMIN (GLUCOPHAGE) 500 mg tablet Take 1 Tablet by mouth two (2) times daily (with meals) for 30 days. 1/19/23 2/18/23 Yes Silvio Ndiaye MD   glipiZIDE (GLUCOTROL) 5 mg tablet Take 1 tablet by mouth twice daily 12/2/22  Yes Mariza Connolly MD   ipratropium (ATROVENT) 21 mcg (0.03 %) nasal spray 2 Sprays by Both Nostrils route every twelve (12) hours.  11/21/22  Yes Mariza Connolly MD   ergocalciferol (ERGOCALCIFEROL) 1,250 mcg (50,000 unit) capsule Take 1 Capsule by mouth every seven (7) days. Patient taking differently: Take 50,000 Units by mouth every seven (7) days. Mondays 10/14/22  Yes Mariza Connolly MD   Januvia 50 mg tablet Take 1 tablet by mouth once daily 9/23/22  Yes Mariza Connolly MD   rosuvastatin (CRESTOR) 20 mg tablet Take 1 Tablet by mouth nightly. 2/28/22  Yes Amanda Sutherland MD   aspirin delayed-release 81 mg tablet Take 81 mg by mouth daily. Yes Provider, Historical   zolpidem (AMBIEN) 5 mg tablet Take 1 Tablet by mouth nightly as needed for Sleep. Max Daily Amount: 5 mg. 1/24/23   Mariza Connolly MD   nitroglycerin (NITROSTAT) 0.4 mg SL tablet 1 Tablet by SubLINGual route every five (5) minutes as needed for Chest Pain. Up to 3 doses. 11/16/22   Ariadna Rene MD       Allergies/Social/Family History: Allergies   Allergen Reactions    Ambien [Zolpidem] Other (comments)     Causes extreme confusion      Social History     Tobacco Use    Smoking status: Never     Passive exposure: Never    Smokeless tobacco: Never   Substance Use Topics    Alcohol use:  Yes     Alcohol/week: 2.0 standard drinks     Types: 1 Cans of beer, 1 Shots of liquor per week     Comment: rarely      Family History   Problem Relation Age of Onset    Heart Disease Father     Heart Attack Father     Hypertension Mother     Elevated Lipids Brother     Elevated Lipids Brother     No Known Problems Sister     Elevated Lipids Brother     No Known Problems Son     No Known Problems Daughter     Anesth Problems Neg Hx        Review of Systems:     10 system reviewed and they are negative but as in interval history    Objective:   Vital Signs:  Visit Vitals  BP (!) 108/57 (BP 1 Location: Left upper arm, BP Patient Position: At rest)   Pulse 82   Temp 97.6 °F (36.4 °C)   Resp 20   Ht 5' 6\" (1.676 m) Comment: per wife   Wt 54.7 kg (120 lb 8 oz)   SpO2 94%   BMI 19.45 kg/m²    O2 Flow Rate (L/min): 2 l/min O2 Device: None (Room air) Temp (24hrs), Av.9 °F (36.6 °C), Min:97.6 °F (36.4 °C), Max:98.3 °F (36.8 °C)    CVP (mmHg): 23 mmHg (23 1200)      Intake/Output:     Intake/Output Summary (Last 24 hours) at 2023 1245  Last data filed at 2023 1200  Gross per 24 hour   Intake 1535.39 ml   Output 2725 ml   Net -1189.61 ml       Physical Exam:    General - chronically ill looking, very pleasant up in chair  HEENT- pupils equal, EOMI, anicteric  Neuro - moves all extrem, follows basic commands, no focal deficit  CV - RRR, systolic murmur  Resp - rales b/l, NC  Abd - soft, nontender, no guarding  MSK- intact, no sacral ulcer    LABS AND  DATA: Personally reviewed  Recent Labs     232 23  0431   WBC 5.1 4.9   HGB 9.3* 9.2*   HCT 32.2* 30.0*    198     Recent Labs     232 23  0431   * 133*   K 4.3 4.0    99   CO2 29 31   BUN 28* 30*   CREA 1.60* 1.62*   * 179*   CA 9.3 9.0   MG 2.4 2.4   PHOS 3.4 3.4     Recent Labs     232 23  0431    99   TP 6.5 6.4   ALB 2.9* 2.9*   GLOB 3.6 3.5     No results for input(s): INR, PTP, APTT, INREXT in the last 72 hours. No results for input(s): PHI, PCO2I, PO2I, FIO2I in the last 72 hours. No results for input(s): CPK, CKMB, TROIQ, BNPP in the last 72 hours.     Hemodynamics:   PAP: PAP Systolic: 41 (39/17/43 1891) CO: CO (l/min): 4.9 l/min (23 0800)   Wedge: PAOP (PCWP) (mmhg): 36 mmHg (23 1830) CI: CI (l/min/m2): 2.3 l/min/m2 (23 0800)   CVP:  CVP (mmHg): 23 mmHg (23 1200) SVR:       PVR:       Ventilator Settings:  Mode Rate Tidal Volume Pressure FiO2 PEEP            30 %       Peak airway pressure:      Minute ventilation: 9.8 l/min        MEDS: Reviewed    Chest X-Ray:  CXR Results  (Last 48 hours)      None            Assessment and Plan:   Acute on chronic HFrEF (20%) NYHA IIIb/IV (D) on home inotropic support, life vest  TANNER on CKD3  Aflutter w/ RVR  Acute on chronic hypoxemic respiratory failure  CAP  CAD  Gastric neuroendocrine tumor  Hx of LUE DVT  DM  PAD  TRISTAN  BPH w/ urinary retention requiring chronic donnelly     NEUROLOGICAL:    Mentation appropriate  Delirium precautions  Encourage work w/ PT/OT     PULMONOLOGY:   O2 per NC PRN for spO2 >92%     CARDIOVASCULAR:   Diuretics placed on hold by advanced heart failure, received 250 cc bolus of fluid  C/w milrinone [plan to discontinue on Tuesday morning], dobutamine, documented weight change and thus infusion dose on February 12  Telemetry, close hemodynamic monitoring  Unable to tolerate BB, ARNI/ARB due to , aldactone held 2/2 elevated K   C/w ASA, amiodarone, statin  Ongoing LVAD workup by AHF team, plan for LVAD placement 2/15  Monitor for signs of hypoperfusion, monitor UOP     GASTROINTESTINAL:   PPI  Cardiac, renal, diabetic diet as tolerated             RENAL/ELECTROLYTE/FLUIDS:   Strict I/Os  Nephrology following  Monitor RFP  Mg/Phos/K >2/3/4  Donnelly to remain in place  PRN bumex     ENDOCRINE:   SSI, Basal insulin  Hypoglycemic protocol  Glucose goal 120-180     HEMATOLOGY/ONCOLOGY:   On eliquis  EPO weekly  Gastric neuroendocrine tumor, well differentiated, good prognosis per onc. Not barrier to LVAD implant     ID/MICRO:   S/p CAP treatment            DISPOSITION  Stay in ICU      CRITICAL CARE CONSULTANT NOTE  I had a face to face encounter with the patient, reviewed and interpreted patient data including clinical events, labs, images, vital signs, I/O's, and examined patient. I have discussed the case and the plan and management of the patient's care with the consulting services, the bedside nurses and the respiratory therapist.      NOTE OF PERSONAL INVOLVEMENT IN CARE   This patient has a high probability of imminent, clinically significant deterioration, which requires the highest level of preparedness to intervene urgently.  I participated in the decision-making and personally managed or directed the management of the following life and organ supporting interventions that required my frequent assessment to treat or prevent imminent deterioration. Racheal Jacobs M.D.   Staff Intensivist/Pulmonologist  [facility]  2/12/2023

## 2023-02-13 ENCOUNTER — TELEPHONE (OUTPATIENT)
Dept: CARDIOLOGY CLINIC | Age: 73
End: 2023-02-13

## 2023-02-13 ENCOUNTER — ANESTHESIA EVENT (OUTPATIENT)
Dept: CARDIOTHORACIC SURGERY | Age: 73
End: 2023-02-13
Payer: MEDICARE

## 2023-02-13 NOTE — PROGRESS NOTES
RENAL  PROGRESS NOTE        Subjective:   Sitting in chair; eating lunch during visit. Wife at the bedside  Objective:   VITALS SIGNS:    Visit Vitals  BP (!) 109/51 (BP 1 Location: Left upper arm, BP Patient Position: At rest)   Pulse 86   Temp 98.1 °F (36.7 °C)   Resp 20   Ht 5' 6\" (1.676 m)   Wt 56.1 kg (123 lb 10.9 oz)   SpO2 93%   BMI 19.96 kg/m²       O2 Device: None (Room air)   O2 Flow Rate (L/min): 2 l/min   Temp (24hrs), Av °F (36.7 °C), Min:97.7 °F (36.5 °C), Max:98.3 °F (36.8 °C)         PHYSICAL EXAM:  NAD  Clear  Regular rate rhythm   Trace ankle edema  AOx3    DATA REVIEW:     INTAKE / OUTPUT:   Last shift:      701 -  1900  In: 50 [I.V.:50]  Out: 500 [Urine:500]  Last 3 shifts: 1901 -  0700  In: 1893.6 [P.O.:1000; I.V.:893.6]  Out: 2625 [Urine:2625]    Intake/Output Summary (Last 24 hours) at 2023 1403  Last data filed at 2023 0800  Gross per 24 hour   Intake 500 ml   Output 1300 ml   Net -800 ml           LABS:   Recent Labs     23  0407 23  0422 23  0431   WBC 8.1 5.1 4.9   HGB 9.7* 9.3* 9.2*   HCT 32.9* 32.2* 30.0*    211 198       Recent Labs     23  0407 23  0422 23  0431   * 135* 133*   K 4.6 4.3 4.0    101 99   CO2 27 29 31   * 132* 179*   BUN 23* 28* 30*   CREA 1.53* 1.60* 1.62*   CA 9.0 9.3 9.0   MG 2.4 2.4 2.4   PHOS 3.1 3.4 3.4   ALB 3.1* 2.9* 2.9*   TBILI 0.6 0.5 0.6   ALT 22 25 25           Assessment:  TANNER on CKD-3a: Suspect cardiorenal effects with needed diuresis. Hypotension/poor renal perfusion likely culprit. Hyperkalemia: better     Ischemic CM: Recurrent exacerbations. EF 20%. On continuous Milrinone since last admission. BPH: s/p donnelly     Well differentiated NET Grade 1: noted on EGD 23     Anemia 2 to CKD:Hgb sub-optimal     Hyponatremia: mild-> better       Left UE basilic and radial vein thrombus    TANNER has resolved. CR is back to baseline.   We reviewed potential risk of recurrence of TANNER on CKD after LVAD and even possibility of needing dialysis which could be temporary or permanent.   He is optimized as best as possible from kidney standpoint before the LVAD, which is planned on Wednesday now     Plan/Recommendations:  Continue current treatment including milrinone  IV dobutamine  Continue Bumex    Discussed with patient and wife    Aleksandra Hill MD

## 2023-02-13 NOTE — WOUND CARE
Asked to check heel. Right heel is now dry and stable. NO erythema, no purulence, no fluctuance. Painted with betadine. Appears stable and not problematic. Continue betadine as ordered.    QUYNH Cohen

## 2023-02-13 NOTE — TELEPHONE ENCOUNTER
Tram Jessica is still pending just talk to [facility] today 2/13/23 again LVAD still pending FYI

## 2023-02-13 NOTE — PROGRESS NOTES
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 Kirti Mendoza  Patient presented with symptomatic SVT (a-flutter) with RVR converted to sinus tach after amio loading; in the setting of pneumonia and likely intolerance to inotropes  Patient completed remainder of evaluation for LVAD for destination therapy while in-patient and treatment of pneumonia; during LVAD eval, found to have well-differentiated neuroendocrine tumor on gastric body and gastric polyp, G1, per oncology treatable tumor with good prognosis and would not preclude LVAD.   Patient was approved for LVAD implantation as destination therapy at [facility] 2/3/23 with tentative plans to proceed to OR on 2/15/23; currently undergoing SGC-guided optimization in CV-ICU   The following have been identified as relative concerns pertaining to LVAD candidacy: small body surface area, cachexia, BMI 18, malnutrition, frailty, advanced age, muscular deconditioning, PVD (fingertips), urinary retention requiring donnelly catheter, neuroendocrine tumor class 1 requiring treatment after LVAD, heal injury requiring wound care consult, mild neurocognitive dysfunction, chronic kidney disease and hearing loss requiring hearing aid       RECOMMENDATIONS:  Continue milrinone 0.375 mcg/kg/min- will stop milrinone on Tuesday morning   Continue Dobutamine 2mcg/kg/min  No beta-blockers due to hypotension and RV conditioning prior to LVAD  Cannot tolerate ARB/ARNi due to hypotension and upcoming surgical procedure   Cannot tolerate spironolactone due to hyperkalemia  Cannot tolerate jardiance due to significant diuresis on smallest dose/contributed to IVVD  Discontinued corlanor due to SVT  Digoxin stopped d/t risk for toxicity with amio loading  Continue amiodarone, appreciate EP cardiology recs  Resume Bumex 1mg daily  Appreciate nephrology recommendations   Continue Mag ox 400 mg daily   Keep K+ >4 and Mg >2  Continue eliquis 5mg twice daily- will stop Eliquis on Monday .  No bridging per Dr. Annabelle Mccaryt   S/p venofer 200mg x 2 doses  Transfuse to keep hgb closer to 8 for potential surgical procedure  Abx/treatment of suspected PNA per hospitalist  Continue lifevest  Continue baby aspirin   Plavix previously stopped, okayed by Dr. Crissy Chris consult pending (high risk for TRISTAN)  Heel pressure ulcer- wound care consulted  VAD coordinator to provide education  Focus on physical therapy and ambulate daily  Incentive spirometry and wean oxygen NC  Nutritionist consultation   consultation and daily support; help with hearing aids  Appreciate Palliative care recommendations   Tentative OR date 2/15/23  Will give 1 dose Vit K on Tuesday 2/14- ordered   Will place lines in [facility] today   Blood products and antibiotics ordered   Will have consents signed on Monday  Family meeting with wife, son, patient, VAD coordinator and SW held on Friday re: anticipated perioperative course and risks and expectations of living with LVAD and complications; all questions answered to satisfaction  Ambulate daily and nutrition daily     All other care per primary team      INTERVAL HISTORY:  Feels great  Ambulated twice in outside the unit  Hemodynamics stable   Milrinone 0.375  Dobutamine 2mcg  I/O net positive 600ml  2 liters O2  Hgb stable 9.7  Cr stable at 1.53  On RA  Ambulating and eating well       IMPRESSION:  Acute on chronic combined systolic/diastolic  heart failure  Stage D, NYHA class IV symptoms   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, LVEF 20% (by echo 1/2023) and 23% (by cMRI 1/3/23)  Cardiac MRI suggestive of ischemic cardiomyopathy  PYP equivocal  Chronic milrinone and dobutamine infusion as palliation   Coronary artery disease  CAD s/p CABG x 2: further disease best managed medically due to small vessel size   At risk of sudden cardiac death  Peripheral arterial disease  Bilateral hydronephrosis s/p donnelly  Cardiac risk factors:  HTN  HL  TRISTAN, STOP-BANG 4  DM2  CKD, stage 3  MOCA from 2/9 19/30, consistent with mild cognitive impairment  Hard of hearing  Gastric Neuroendocrine Tumor, elevated gastrin and chromogranin A  Sepsis, unclear source- resolved         LIFE GOALS:  Lifestyle goals reviewed with the patient. Patient's personal goals include: having a few more years with family  Important upcoming milestones or family events: None at this time   The patient identifies the following as important for living well: being at home, not being SOB              CARDIAC IMAGING:  Echo 1/27/23    Left Ventricle: EF by visual approximation is 20%. Left ventricle is mildly dilated. Mitral Valve: Moderately thickened leaflet, at the anterior and posterior leaflets. Moderately calcified leaflet. Moderate regurgitation. Left Atrium: Left atrium is moderately dilated. Technical qualifiers: Echo study was technically difficult with poor endocardial visualization. Contrast used: Definity. Limited study, not all structures viewed     Echo 1/9/23   Left Ventricle: Severely reduced left ventricular systolic function with a visually estimated EF of 25 - 30%. Left ventricle size is normal. Mildly increased wall thickness. Echo 12/26/22    Left Ventricle: Severely reduced left ventricular systolic function with a visually estimated EF of 25 - 30%.  Left ventricle size is normal. Normal wall thickness. There are regional wall motion abnormalities. Grade II diastolic dysfunction with increased LAP. Right Ventricle: Moderately reduced systolic function. TAPSE is abnormal. TAPSE is 1.1 cm. Aortic Valve: Mild stenosis of the aortic valve. AV peak gradient is 13 mmHg. AV peak velocity is 1.8 m/s. Mitral Valve: Not well visualized. Moderate annular calcification at the posterior leaflet of the mitral valve. Mild to moderate regurgitation. Tricuspid Valve: Mildly elevated RVSP. Left Atrium: Left atrium is moderately dilated. 12/8/22    Left Ventricle: Moderately reduced left ventricular systolic function with a visually estimated EF of 35 - 40%. Severe hypokinesis of the following segments: mid anteroseptal, apical anterior, apical septal, apical inferior and apical lateral. Severe hypokinesis of the apex. Mitral Valve: Severely thickened leaflet, at the anterior and posterior leaflets. Severely calcified leaflet, at the anterior and posterior leaflets. Mild annular calcification of the mitral valve. Moderate regurgitation. Left Atrium: Left atrium is mildly dilated. Contrast used: Definity. limited study     EKG 12/22/22 ST, Biatria enlargement, marked ST abnormality     [facility] 12/6/22  1. Normal LVEDP  2. Severe native multivessel coronary artery disease  3. Patent LIMA to LAD and vein graft to distal RCA  4. Recurrent ISR in OM1 stent with now 60 to 70% restenosis  5. Recoil of left main and circumflex stent with now recurrent 40 to 50% stenosis. 6.  Progression of ostial left main disease now to about 60% stenosis  7. Progression of disease in jailed first marginal branch now with diffuse 90% stenosis  8.   High-grade stenosis in the mid to distal right potential femoral artery treated with 6 x 40 mm impact drug-coated balloon angioplasty to reduce the stenosis to less than 40%        HEMODYNAMICS:  RHC 1/9/23  PA 20/9, RA 3, PCWP 8, CI 1.8     CPEST too ill   6MW 300 feet     PHYSICAL EXAM:  Visit Vitals  BP (!) 113/52 (BP 1 Location: Left upper arm, BP Patient Position: At rest)   Pulse 84   Temp 97.7 °F (36.5 °C)   Resp 26   Ht 5' 6\" (1.676 m) Comment: per wife   Wt 123 lb 10.9 oz (56.1 kg)   SpO2 94%   BMI 19.96 kg/m²     General: Patient is well developed, well-nourished in no acute distress  HEENT: Unremarkable   Neck: Supple. No evidence of thyroid enlargements or lymphadenopathy. JVD: Cannot be appreciated   Lungs: Breath sounds are equal and clear bilaterally. No wheezes, rhonchi, or rales. Heart: Regular rate and rhythm with normal S1 and S2. No murmurs, gallops or rubs. Abdomen: Soft, no mass or tenderness. No organomegaly or hernia. Bowel sounds present. Genitourinary and rectal: deferred  Extremities: No cyanosis, clubbing, or edema. Neurologic: No focal sensory or motor deficits are noted. Grossly intact. Psychiatric: Awake, alert an doriented x 3. Appropriate mood and affect. Skin: Warm, dry and well perfused. REVIEW OF SYSTEMS:  General: Denies fever. Ear, nose and throat: Denies difficulty hearing, sinus problems, nosebleeds  Cardiovascular: see above in the interval history  Respiratory: + DONALDSON with ambulating intermittently. Denies cough, wheezing, sputum production, hemoptysis.   Gastrointestinal: Denies heartburn, constipation, diarrhea, abdominal pain, nausea, blood in stool  Kidney and bladder: Denies painful urination, frequent urination  Musculoskeletal: Denies joint pain, muscle weakness  Skin and hair: Denies change in existing skin lesions    PAST MEDICAL HISTORY:  Past Medical History:   Diagnosis Date    CAD (coronary artery disease) 11/10/2016    NSTEMI & 2 stents    Deafness 10/28/2012    DM (diabetes mellitus) ([facility] [address]     Elevated cholesterol     Hypertension     NSTEMI (non-ST elevated myocardial infarction) ([facility] [address] 11/10/2016       PAST SURGICAL HISTORY:  Past Surgical History:   Procedure Laterality Date    COLONOSCOPY N/A 6/28/2018    COLONOSCOPY performed by Reinaldo Cota MD at [facility] ENDOSCOPY    COLONOSCOPY N/A 1/18/2023    COLONOSCOPY performed by Chris Robertson MD at [facility] ENDOSCOPY    [address]  11/11/2016    2 stents       FAMILY HISTORY:  Family History   Problem Relation Age of Onset    Heart Disease Father     Heart Attack Father     Hypertension Mother     Elevated Lipids Brother     Elevated Lipids Brother     No Known Problems Sister     Elevated Lipids Brother     No Known Problems Son     No Known Problems Daughter     Anesth Problems Neg Hx        SOCIAL HISTORY:  Social History     Socioeconomic History    Marital status:    Tobacco Use    Smoking status: Never     Passive exposure: Never    Smokeless tobacco: Never   Vaping Use    Vaping Use: Never used   Substance and Sexual Activity    Alcohol use: Yes     Alcohol/week: 2.0 standard drinks     Types: 1 Cans of beer, 1 Shots of liquor per week     Comment: rarely    Drug use: No    Sexual activity: Yes     Social Determinants of Health     Financial Resource Strain: Medium Risk    Difficulty of Paying Living Expenses: Somewhat hard   Food Insecurity: Food Insecurity Present    Worried About Running Out of Food in the Last Year: Never true    Ran Out of Food in the Last Year: Often true       LABORATORY RESULTS:     Labs Latest Ref Rng & Units 2/13/2023 2/12/2023 2/11/2023 2/10/2023 2/9/2023 2/8/2023 2/8/2023   WBC 4.1 - 11.1 K/uL 8.1 5.1 4.9 5.8 5.7 - -   RBC 4.10 - 5.70 M/uL 3.85(L) 3.71(L) 3.57(L) 3.77(L) 3.56(L) - -   Hemoglobin 12.1 - 17.0 g/dL 9.7(L) 9.3(L) 9.2(L) 9.5(L) 8. 9(L) - -   Hematocrit 36.6 - 50.3 % 32. 9(L) 32. 2(L) 30. 0(L) 32. 5(L) 30. 4(L) - -   MCV 80.0 - 99.0 FL 85.5 86.8 84.0 86.2 85.4 - -   Platelets 418 - 008 K/uL 221 211 198 192 177 - -   Lymphocytes 12 - 49 % 11(L) 20 22 - 19 - -   Monocytes 5 - 13 % 6 8 8 - 10 - -   Eosinophils 0 - 7 % 2 5 5 - 4 - -   Basophils 0 - 1 % 1 1 1 - 0 - - Albumin 3.5 - 5.0 g/dL 3. 1(L) 2. 9(L) 2. 9(L) 3.0(L) 2. 8(L) - 3. 1(L)   Calcium 8.5 - 10.1 MG/DL 9.0 9.3 9.0 8.9 9.0 9.3 8.9   Glucose 65 - 100 mg/dL 173(H) 132(H) 179(H) 180(H) 189(H) 129(H) 129(H)   BUN 6 - 20 MG/DL 23(H) 28(H) 30(H) 29(H) 31(H) 31(H) 31(H)   Creatinine 0.70 - 1.30 MG/DL 1.53(H) 1.60(H) 1.62(H) 1.60(H) 1.55(H) 1.54(H) 1.55(H)   Sodium 136 - 145 mmol/L 134(L) 135(L) 133(L) 134(L) 133(L) 136 134(L)   Potassium 3.5 - 5.1 mmol/L 4.6 4.3 4.0 4.5 4.4 4.0 4.2   TSH 0.36 - 3.74 uIU/mL - - - - - - -   PSA 0.01 - 4.0 ng/mL - - - - - - -   LDH 85 - 241 U/L - - - - - - -   Some recent data might be hidden     Lab Results   Component Value Date/Time    TSH 3.52 01/28/2023 05:26 AM    TSH 2.12 12/27/2022 02:36 PM    TSH 4.80 (H) 12/06/2022 03:53 AM    TSH 5.39 (H) 10/12/2022 09:10 AM    TSH 3.53 02/03/2022 11:47 AM    TSH 5.790 (H) 11/21/2019 04:45 PM    TSH 3.08 06/22/2018 01:53 PM    TSH 4.250 05/26/2015 09:43 AM       ALLERGY:  Allergies   Allergen Reactions    Ambien [Zolpidem] Other (comments)     Causes extreme confusion        CURRENT MEDICATIONS:    Current Facility-Administered Medications:     bumetanide (BUMEX) tablet 1 mg, 1 mg, Oral, DAILY, Shaila, Lizette B, NP    milrinone (PRIMACOR) 20 MG/100 ML D5W infusion, 0.375 mcg/kg/min, IntraVENous, CONTINUOUS, Adriana Broussard MD, Last Rate: 6.2 mL/hr at 02/13/23 0815, 0.375 mcg/kg/min at 02/13/23 0815    DOBUTamine (DOBUTREX) 500 mg/250 mL (2,000 mcg/mL) infusion, 2 mcg/kg/min, IntraVENous, CONTINUOUS, Adriana Broussard MD, Last Rate: 3.3 mL/hr at 02/13/23 0815, 2 mcg/kg/min at 02/13/23 0815    epoetin heidy-epbx (RETACRIT) injection 10,000 Units, 10,000 Units, SubCUTAneous, Q7D, Desire Dodd MD, 10,000 Units at 02/11/23 1630    insulin lispro (HUMALOG) injection 8 Units, 8 Units, SubCUTAneous, TID WITH MEALS, Cathy Sheldon, CNS, 8 Units at 02/13/23 0830    insulin glargine (LANTUS) injection 6 Units, 6 Units, SubCUTAneous, DAILY, Cathy Sheldon, CNS, 6 Units at 02/13/23 0830    albuterol-ipratropium (DUO-NEB) 2.5 MG-0.5 MG/3 ML, 3 mL, Nebulization, Q4H PRN, Ralf MOLINA MD, 3 mL at 02/06/23 1539    magnesium oxide (MAG-OX) tablet 400 mg, 400 mg, Oral, DAILY, Shaila, Erin B, NP, 400 mg at 02/13/23 0831    0.9% sodium chloride infusion, 6 mL/hr, IntraVENous, CONTINUOUS, Nancy Stovall MD, Last Rate: 3 mL/hr at 02/11/23 0440, 3 mL/hr at 02/11/23 0440    0.9% sodium chloride infusion 250 mL, 250 mL, IntraVENous, PRN, Lizette Linton, NP    alteplase (CATHFLO) 1 mg in sterile water (preservative free) 1 mL injection, 1 mg, InterCATHeter, PRN, Lizette Linton, NP    bacitracin 500 unit/gram packet 1 Packet, 1 Packet, Topical, PRN, Lalo Lnitonin B, NP    bumetanide (BUMEX) injection 1 mg, 1 mg, IntraVENous, Q4H PRN, Lizette Linton NP, 1 mg at 02/06/23 1349    senna-docusate (PERICOLACE) 8.6-50 mg per tablet 1 Tablet, 1 Tablet, Oral, DAILY PRN, Luba STALEY NP, 1 Tablet at 02/12/23 0732    traZODone (DESYREL) tablet 50 mg, 50 mg, Oral, QHS PRN, Nicole Collins MD, 50 mg at 02/12/23 2147    glucose chewable tablet 16 g, 4 Tablet, Oral, PRN, Ita Harvey NP    glucagon (GLUCAGEN) injection 1 mg, 1 mg, IntraMUSCular, PRN, Ita Harvey NP    dextrose 10 % infusion 0-250 mL, 0-250 mL, IntraVENous, PRN, Ita Harvey NP    insulin lispro (HUMALOG) injection, , SubCUTAneous, AC&HS, Ita Harvey NP, 2 Units at 02/13/23 0830    balsam peru-castor oiL (VENELEX) ointment, , Topical, BID, Eloy Landau, MD, Given at 02/13/23 0831    lidocaine 4 % patch 1 Patch, 1 Patch, TransDERmal, Q24H, Nicole Collins MD, 1 Patch at 02/10/23 1201    amiodarone (CORDARONE) tablet 400 mg, 400 mg, Oral, BID, Lillie Humphrey MD, 400 mg at 02/13/23 0831    aspirin delayed-release tablet 81 mg, 81 mg, Oral, DAILY, Heber Barajas MD, 81 mg at 02/13/23 0831    sodium chloride (NS) flush 5-40 mL, 5-40 mL, IntraVENous, Q8H, Jenn Heber STALEY MD, 10 mL at 02/13/23 0633    sodium chloride (NS) flush 5-40 mL, 5-40 mL, IntraVENous, PRN, Heber Barajas MD    acetaminophen (TYLENOL) tablet 650 mg, 650 mg, Oral, Q6H PRN, 650 mg at 02/13/23 0920 **OR** acetaminophen (TYLENOL) suppository 650 mg, 650 mg, Rectal, Q6H PRN, Heber Barajas MD    polyethylene glycol (MIRALAX) packet 17 g, 17 g, Oral, DAILY PRN, Heber Barajas MD, 17 g at 02/12/23 0732    ondansetron (ZOFRAN ODT) tablet 4 mg, 4 mg, Oral, Q8H PRN, 4 mg at 01/30/23 1357 **OR** ondansetron (ZOFRAN) injection 4 mg, 4 mg, IntraVENous, Q6H PRN, Heber Barajas MD, 4 mg at 02/03/23 1926    pantoprazole (PROTONIX) tablet 40 mg, 40 mg, Oral, ACB, Heber Barajas MD, 40 mg at 02/13/23 5333    rosuvastatin (CRESTOR) tablet 20 mg, 20 mg, Oral, QHS, Heber Barajas MD, 20 mg at 02/12/23 2147    PATIENT CARE TEAM:  Patient Care Team:  Louis Urias MD as PCP - General (Family Medicine)  Louis Urias MD as PCP - [facility]  Mario Marcelino MD (Cardiovascular Disease Physician)  Marcy Maloney MD (Gastroenterology)  Clara Carcamo MD (Cardiothoracic Surgery)  Joel Mcgee MD (Cardiovascular Disease Physician)  Lucie Davenport MD (Nephrology)  Eli Gandhi RN as Care Transitions Nurse  Nataly Alaniz MD (Pulmonary Disease)  Maggie Mueller MD ([address] [place] [address] Vascular Surgery)     Thank you for allowing me to participate in this patient's care. Julio Avila NP   Advanced Heart Failure [address]  [address] [facility], [address]  Phone: [phone]    Total Critical Care time spent: time  35 minutes. There was no overlap with other services        Critical care was necessary to treat or prevent imminent or life threatening deterioration of the following conditions: cardiac failure, respiratory failure and CNS failure or compromise     Services Provided:  1. Telemetry review and 12 lead ECG interpretation  2.  Hemodynamic interpretation, assessment, and management  3. Review and interpretation of CXR  4. Review and interpretation of lab values  5. Review and interpretation of microbiologic data and culture results  6. Review of medications and administration  7. Review and interpretation of nutrition requirements and management  8. Discussion of management withother consultants and services  9.  Clinical update to family members

## 2023-02-13 NOTE — PROGRESS NOTES
0800: Bedside and Verbal shift change report given to Montserrat SANDOVAL (oncoming nurse) by Shane Razo (offgoing nurse). Report included the following information SBAR and Cardiac Rhythm NSR .     0925: PRN Tylenol given and wound care provided to RLE DTI. 1000: E Shaila at bedside - PICC to be removed this afternoon as long as pt has 2 patent PIVs.    1330:  Call placed to wound care to reassess R heel DTI d/t wife's concern of poor wound healing. 1530: Wound care at bedside - wound healing, will continue to provide wound care as noted. 2000: Bedside and Verbal shift change report given to Shane Razo (oncoming nurse) by Yolande Valle (offgoing nurse). Report included the following information SBAR and Cardiac Rhythm NSR .

## 2023-02-13 NOTE — PROGRESS NOTES
Problem: Mobility Impaired (Adult and Pediatric)  Goal: *Acute Goals and Plan of Care (Insert Text)  Description: FUNCTIONAL STATUS PRIOR TO ADMISSION: Patient was modified independent using a rollator for functional mobility. Pt recently d/c home after previous hospital stay. Able to ambulate community distances. HOME SUPPORT PRIOR TO ADMISSION: The patient lived with spouse but did not require assist.    Physical Therapy Goals  Updated 2/13/2023  1. Patient will move from supine to sit and sit to supine in bed with modified independence within 7 day(s). MET 2/13  2. Patient will transfer from bed to chair and chair to bed with modified independence using the least restrictive device within 7 day(s). MET 2/13  3. Patient will perform sit to stand with modified independence within 7 day(s). MET 2/13  4. Patient will ambulate with modified independence for 300 feet with the least restrictive device within 7 day(s). MET 2/13  5. Patient will ascend/descend 12 stairs with 1 handrail(s) with supervision/set-up within 7 day(s). 6.  Patient will verbally and functionally recall move in the tube techniques in prep for possible LVAD within 7 days. Physical Therapy Goals  Initiated 1/28/2023-- Goals appropriate and add #6 2/6/2023  1. Patient will move from supine to sit and sit to supine  in bed with modified independence within 7 day(s). 2.  Patient will transfer from bed to chair and chair to bed with modified independence using the least restrictive device within 7 day(s). 3.  Patient will perform sit to stand with modified independence within 7 day(s). 4.  Patient will ambulate with modified independence for 300 feet with the least restrictive device within 7 day(s). 5.  Patient will ascend/descend 12 stairs with 1 handrail(s) with supervision/set-up within 7 day(s). 6.  Patient will verbally and functionally recall move in the tube techniques in prep for possible LVAD within 7 days.    Outcome: Progressing Towards Goal   PHYSICAL THERAPY TREATMENT: WEEKLY REASSESSMENT  Patient: Dominic Welsh (76 y.o. male)  Date: 2/13/2023  Primary Diagnosis: CHF (congestive heart failure) ([facility]) [I50.9]  Procedure(s) (LRB):  LEFT VENTRICULAR ASSIST DEVICE HEARTMATE 3 PERMANENT (Left)     Precautions:   Fall      ASSESSMENT  Patient continues with skilled PT services and is progressing towards goals. Patient found seated in chair, all VSS on RA, and agreeable to PT. Patient is overall mod-I for transfers and supervision - mod I for ambulation of 350 feet with rollator. Patient ambulates with steady gait with no LOB and with all VSS. Patient returned to room, and time spent educating patient and patient's wife about mobility expectations s/p LVAD (I.e. move in the tube, use of different holsters), and on LVAD components in prep for scheduled LVAD 2/15. Time spent addressing patient's questions. Patient left seated in chair with all needs in reach. Patient's progression toward goals since last assessment: 4 goals met - goals kept on POC for re-assessment s/p LVAD    Current Level of Function Impacting Discharge (mobility/balance): ambulates 350 feet with supervision - mod I with rollator     Functional Outcome Measure: The patient scored 22/28 on the tinetti outcome measure which is indicative of moderate risk for falls. Other factors to consider for discharge: upcoming LVAD 2/15         PLAN :  Goals have been updated based on progression since last assessment. Patient continues to benefit from skilled intervention to address the above impairments. Recommendations and Planned Interventions: bed mobility training, transfer training, gait training, therapeutic exercises, neuromuscular re-education, patient and family training/education, and therapeutic activities      Frequency/Duration: Patient will be followed by physical therapy:  5 times a week to address goals.     Recommendation for discharge: (in order for the patient to meet his/her long term goals)  To be determined: dependent on progress after LVAD    This discharge recommendation:  A follow-up discussion with the attending provider and/or case management is planned    IF patient discharges home will need the following DME: to be determined (TBD)         SUBJECTIVE:   Patient stated Nisha Combs will I be able to walk after surgery?     OBJECTIVE DATA SUMMARY:   HISTORY:    Past Medical History:   Diagnosis Date    CAD (coronary artery disease) 11/10/2016    NSTEMI & 2 stents    Deafness 10/28/2012    DM (diabetes mellitus) ([facility])     Elevated cholesterol     Hypertension     NSTEMI (non-ST elevated myocardial infarction) ([facility] [address] 11/10/2016     Past Surgical History:   Procedure Laterality Date    COLONOSCOPY N/A 6/28/2018    COLONOSCOPY performed by Kit Sandifer, MD at [address] [place] [address] ENDOSCOPY    COLONOSCOPY N/A 1/18/2023    COLONOSCOPY performed by Derrick Uribe MD at [address] [place] [address]  11/11/2016    2 stents       Personal factors and/or comorbidities impacting plan of care: upcoming LVAD 2/15     Home Situation  Home Environment: Private residence  # Steps to Enter: 12  Rails to Enter: Yes  Hand Rails : Left  Wheelchair Ramp: No  One/Two Story Residence: Two story  # of Interior Steps: 12  Interior Rails: Left  Living Alone: No  Support Systems: Spouse/Significant Other  Patient Expects to be Discharged to[de-identified] Home with home health  Current DME Used/Available at Home: Concepcion Cook, rollator  Tub or Shower Type: Shower    EXAMINATION/PRESENTATION/DECISION MAKING:   Critical Behavior:  Neurologic State: Alert  Orientation Level: Oriented X4  Cognition: Appropriate decision making, Appropriate for age attention/concentration, Follows commands  Safety/Judgement: Awareness of environment, Fall prevention, Insight into deficits  Hearing:   Auditory  Auditory Impairment: Hard of hearing, bilateral, Hearing aid(s)  Hearing Aids/Status: Left  Skin:  wound R heel - RN aware   Edema: none noted  Range Of Motion:  AROM: Within functional limits                       Strength:    Strength: Generally decreased, functional                    Tone & Sensation:   Tone: Normal              Sensation: Intact               Coordination:  Coordination: Within functional limits  Vision:      Functional Mobility:  Bed Mobility:              Transfers:  Sit to Stand: Independent  Stand to Sit: Independent                       Balance:   Sitting: Intact; Without support  Sitting - Static: Good (unsupported)  Sitting - Dynamic: Good (unsupported)  Standing: Intact; With support  Standing - Static: Good;Constant support  Standing - Dynamic : Good;Constant support  Ambulation/Gait Training:  Distance (ft): 350 Feet (ft)  Assistive Device: Gait belt;Walker, rollator  Ambulation - Level of Assistance: Supervision        Gait Abnormalities: Decreased step clearance        Base of Support: Narrowed     Speed/Bette: Shuffled  Step Length: Right shortened;Left shortened                   Functional Measure:  Tinetti test:    Sitting Balance: 1  Arises: 1  Attempts to Rise: 2  Immediate Standing Balance: 2  Standing Balance: 2  Nudged: 2  Eyes Closed: 1  Turn 360 Degrees - Continuous/Discontinuous: 1  Turn 360 Degrees - Steady/Unsteady: 1  Sitting Down: 1  Balance Score: 14 Balance total score  Indication of Gait: 1  R Step Length/Height: 1  L Step Length/Height: 1  R Foot Clearance: 1  L Foot Clearance: 1  Step Symmetry: 1  Step Continuity: 1  Path: 1  Trunk: 0  Walking Time: 0  Gait Score: 8 Gait total score  Total Score: 22/28 Overall total score         Tinetti Tool Score Risk of Falls  <19 = High Fall Risk  19-24 = Moderate Fall Risk  25-28 = Low Fall Risk  Tinetti ME. Performance-Oriented Assessment of Mobility Problems in Elderly Patients. Day 66; [id number].  (Scoring Description: PT Bulletin Feb. 10, 1993)    Older adults: Redd Lopez et al, 2009; n = [address] elderly evaluated with ABC, CROW, ADL, and IADL)  · Mean CROW score for males aged 69-68 years = 26.21(3.40)  · Mean CROW score for females age 69-68 years = 25.16(4.30)  · Mean CROW score for males over 80 years = 23.29(6.02)  · Mean CROW score for females over 80 years = 17.20(8.32)           Pain Ratin/10    Activity Tolerance:   Good and SpO2 stable on RA    After treatment patient left in no apparent distress:   Sitting in chair, Call bell within reach, and Caregiver / family present    COMMUNICATION/EDUCATION:   The patients plan of care was discussed with: Registered nurse. Fall prevention education was provided and the patient/caregiver indicated understanding.     Thank you for this referral.  Sammi Wilson, PT   Time Calculation: 28 mins

## 2023-02-13 NOTE — PROGRESS NOTES
Problem: Self Care Deficits Care Plan (Adult)  Goal: *Acute Goals and Plan of Care (Insert Text)  Description:   FUNCTIONAL STATUS PRIOR TO ADMISSION: Patient required minimal assistance for basic and instrumental ADLs. Used rollator for functional mobility, had HHA and HH OT/PT upon discharge. HOME SUPPORT: The patient lived with wife and family members. Occupational Therapy Goals  Initiated 1/30/2023; Goals remain the same, LVAD scheduled for 2/15  1. Patient will perform grooming with supervision/set-up within 7 day(s). 2.  Patient will perform upper body dressing with supervision/set-up within 7 day(s). 3.  Patient will perform lower body dressing with supervision/set-up within 7 day(s). 4.  Patient will perform all aspects of toileting with supervision/set-up within 7 day(s). 5.  Patient will utilize energy conservation techniques during functional activities with verbal cues within 7 day(s). 2/13/2023 1507 by Osmin Hendricks OT  Outcome: Progressing Towards Goal     OCCUPATIONAL THERAPY TREATMENT/WEEKLY RE-ASSESSMENT  Patient: Catherine Cardoza (76 y.o. male)  Date: 2/13/2023  Diagnosis: CHF (congestive heart failure) ([facility]) [I50.9] <principal problem not specified>  Procedure(s) (LRB):  LEFT VENTRICULAR ASSIST DEVICE HEARTMATE 3 PERMANENT (Left)    Precautions: Fall  Chart, occupational therapy assessment, plan of care, and goals were reviewed. ASSESSMENT  Patient continues with skilled OT services and is progressing towards goals. Patient received seated in recliner chair, agreeable to OT session. Completed cardiac exercises in standing in prep for move in tube precautions following LVAD tentatively scheduled for 2/15. Patient with excellent tolerance and carryover of exercises, good pacing throughout. Following patient engaged in ADL/IADL task focusing on gathering items from varying heights.  Patient with good carryover of reaching BUEs overhead and avoiding deep bending when performing item retrieval with verbal cues. Returned to sitting in recliner chair, spouse at bedside all needs met. Current Level of Function Impacting Discharge (ADLs): up to min A     Other factors to consider for discharge: LVAD on 2/15         PLAN :  Goals have been updated based on progression since last assessment. Patient continues to benefit from skilled intervention to address the above impairments. Continue to follow patient 5 times a week to address goals. Recommend with staff: up to chair daily    Recommend next OT session: move in tube practice    Recommendation for discharge: (in order for the patient to meet his/her long term goals)  To be determined: pending LVAD     This discharge recommendation:  Has been made in collaboration with the attending provider and/or case management    IF patient discharges home will need the following DME: TBD       SUBJECTIVE:   Patient stated Brent Holder you so much, this was fun.     OBJECTIVE DATA SUMMARY:   Cognitive/Behavioral Status:  Neurologic State: Alert  Orientation Level: Oriented X4        Functional Mobility and Transfers for ADLs:  Transfers:  Sit to Stand: Independent     Balance:  Sitting: Intact; Without support  Sitting - Static: Good (unsupported)  Sitting - Dynamic: Good (unsupported)  Standing: Intact; With support  Standing - Static: Good;Constant support  Standing - Dynamic : Good;Constant support    ADL Intervention:     Completed activity focusing on gathering ADL items from overhead and floor level heights.      Therapeutic Exercises:      CARDIAC  EXERCISE   Sets   Reps   Active Active Assist   Passive Self ROM   Comments   Shoulder flexion 1 10 [x]                                            []                                            []                                            []                                               Shoulder abduction 1 10 [x]                                            []                                            [] []                                               Scapular elevation 1 10 [x]                                            []                                            []                                            []                                               Scapular retraction 1 10 [x]                                            []                                            []                                            []                                               Trunk rotation 1 10 [x]                                            []                                            []                                            []                                               Trunk sidebending 1 10 [x]                                            []                                            []                                            []                                                    Pain:  Pt did not endorse pain during session     Activity Tolerance:   Fair and requires rest breaks    After treatment patient left in no apparent distress:   Sitting in chair, Call bell within reach, and Caregiver / family present    COMMUNICATION/COLLABORATION:   The patients plan of care was discussed with: Physical therapist, Occupational therapist, and Registered nurse.      Naty Dumont OT  Time Calculation: 28 mins

## 2023-02-13 NOTE — PROGRESS NOTES
Comprehensive Nutrition Assessment    Type and Reason for Visit: Reassess    Nutrition Recommendations/Plan:   Continue with Regular diet (no salt packets) to promote PO intakes. Preferences updated. Decrease ONS to BID       Malnutrition Assessment:  Malnutrition Status: Moderate malnutrition (01/30/23 1232)    Context:  Acute illness     Findings of the 6 clinical characteristics of malnutrition:   Energy Intake:  75% or less of est energy req for 7 or more days  Weight Loss:  5% over one month     Body Fat Loss:  Mild body fat loss, Buccal region   Muscle Mass Loss:  Mild muscle mass loss, Clavicles (pectoralis & deltoids), Thigh (quadriceps)  Fluid Accumulation:  Mild, Extremities   Strength:  Not performed        Nutrition Assessment:    PMHx: CAD s/p PCI x 2, HFrEF with EF 25-30%, DM, HTN, hypercholesterolemia, Nstemi, CKD stage III. Admitted 1/26 d/t ongoing symptoms r/t his HFrEF. Treated for possible CAP and diuresed. Nephrology following for TANNER on CKD 3. Transferred to CVICU for [facility] placement and ongoing LVAD workup. Tentative LVAD placement planned for 2/15.        2/13: follow-up. Pt visited at bedside, wife present. Appetite and intake better from last week. Wife bringing in [de-identified] of food. Today she brought egg salad sandwich. Pt states the hard boiled eggs worked well, but now he is tired of these and would prefer a bowl of Raisin Bran with 2% milk. Pt has Ensure Plus HP at bedside in strawberry flavor - states he isn't drinking, but he believes this is what is usually delivered. He was unaware of the Glucerna or Nepro shakes, however, wife confirms he gets the Glucerna at lunch. She is not here for dinner, so is unsure. I showed them both a picture and plan is for him to actually try tonight at dinner (higher in kcal and still CHO steady) and if he likes, continue with dinner. If he doesn't like, will send Glucerna with dinner. But overall, 3 ONS/day is too much.   So will d/c Ensure HP with breakfast and change to Glucerna with breakfast.  Pt will not receive ONS at lunch. He likes milk and usually drinks with this meal instead. We discussed typical decreased appetite post-op. Pt in good spirits and overall looks better from a nutrition standpoint today. Note height change to 66\" per wife's report vs previously listed height was 88\" ('s license lists 0'2\"), which helps improve BMI. Pt and wife both confirm today. Overall, wt down from last week, but up from admission. Will be very difficult to assess/ track actual weight gain d/t diuresis. Will remove from goals at this time. -----------------------------------------------------------------------------------------------------  2/6: follow-up and new consult for poor appetite/ intake. Noted weight up from admission (122 lb ---> now 127 lb), both standing scale. Difficult to assess true wt gain vs fluid. UBW prior to December admissions was 135 lb, pt states he has never been higher than 137 lb. Looking back at pt's last 48 wt records in our system (which takes us to 2018) pt's highest consistent wt was 141 lb, but overall wt hx is fairly consistent with report. Wife present. Has been bringing food from outside. She states it is mostly food she prepares and doesn't add salt. However, she brought him Cookout today, so staff concerned with salt content. Wife aware he should be on a low Na+ diet, but states he was told he had no restrictions before since his intake was so poor and he was losing weight. Discussed current low K+ restriction and she states his kidney fx has not been good. She said she tried to get him a grilled cheese earlier this admission and was told he couldn't have this. She asked if she should stop bringing food from outside, perhaps he would be hungrier for more of the hospital food.   We all discussed this as an option, but given his poor nutritional status overall and fact he is picky eater, this could result in him not eating meal at all. I encouraged more homemade food. Pt likes the Ensure - only vanilla and requesting strawberry, which we do not have in a lower CHO option. He was also open to trying Nepro. For now, will remove K+ restriction, but if K+ starts to trend back up, should be resumed. Pt specially states he doesn't like our scrambled eggs and has asked for hard boiled, but doesn't receive. No other food restrictions/ preferences given. He will eat pork/ beef, but not in ways that we usually prepare. For example, will eat a burger, but won't eat the beef stroganoff. Will eat soriano, but not a pork chop. Would eat fish over chicken most of the time. ---------------------------------------------------------------------------------------------------------------------    1/30: initial. PU MST received. WOCN following for DTI to right heel. Familiar with pt from previous admission. Spoke with pt at bedside. Pt recently discharged. He reports good PO intakes for a while when he first went home but his intakes have decreased for a few days. Intakes since admission are documented as 26-75% meals. Breakfast tray observed in room, he ate 25% eggs, sausage. Discussed ONS preferences. Pt states he has been drinking Premier Protein shakes at home, is agreeable to receive Ensure High Protein here, prefers vanilla. During previous admission, pt's wife was bringing in food from home for pt. Multiple snacks on bedside table today from home. Weight hx in EMR indicates 9% weight loss over last 2 months which is significant for time frame. This is consistent with wife's reported UBW of pt as 135 lbs on 12/5.      Labs: Na+ 131, , HgbA1c 7.7      Nutritionally Significant Medications:  Amiodarone, Bumex, Lantus 6 units, Humalog 8 units with meals, SSI, Mag-Ox, Protonix, Miralax, Crestor, pericolace  Drips: dobutamine, milrinone    Estimated Daily Nutrient Needs:  Energy Requirements Based On: Kcal/kg  Weight Used for Energy Requirements: Admission (54.4 kg)  Energy (kcal/day): 1900 (35+ kcal/kg for wt gain)  Weight Used for Protein Requirements: Admission  Protein (g/day): 80 (1.5+ gm/kg)  Method Used for Fluid Requirements: 1 ml/kcal  Fluid (ml/day): 2000 (CHF)    Nutrition Related Findings:   Edema: Generalized: Pitting (2/12/2023  8:00 PM)  LLE: 2+; Pitting (2/13/2023  8:00 AM)  LUE: No Edema (2/13/2023  8:00 AM)  RLE: 2+; Pitting (2/13/2023  8:00 AM)  RUE: No Edema (2/13/2023  8:00 AM)    Last BM: 02/12/23, Formed, Hard    Wounds: Deep tissue injury, Multiple (DTI to right heel, wound to left ankle, WOCN following)      Current Nutrition Therapies:  Diet: Regular  Supplements: Ensure High Protein, Glucerna, and Nepro each 1x/day  Meal intake: Patient Vitals for the past 168 hrs:   % Diet Eaten   02/12/23 1555 76 - 100%   02/12/23 0930 76 - 100%   02/11/23 0800 26 - 50%   02/10/23 0800 76 - 100%   02/09/23 1200 76 - 100%   02/09/23 0900 76 - 100%   02/07/23 1700 0%   02/07/23 1200 51 - 75%   02/07/23 0800 76 - 100%   02/06/23 1600 51 - 75%     Supplement intake: Patient Vitals for the past 168 hrs:   Supplement intake %   02/11/23 0800 76 - 100%   02/07/23 0800 76 - 100%     Nutrition Support: none      Anthropometric Measures:  Height: 5' 6\" (167.6 cm)  Ideal Body Weight (IBW): 142 lbs (65 kg)  Admission Body Weight: 120 lb  Current Body Wt:  56.1 kg (123 lb 10.9 oz), 87.1 % IBW.  Standing scale  Current BMI (kg/m2): 20        Weight Adjustment: No adjustment                 BMI Category: Underweight (BMI less than 22) age over 72      Last 3 Recorded Weights in this Encounter    02/11/23 0700 02/12/23 0807 02/13/23 0712   Weight: 55.5 kg (122 lb 5.7 oz) 54.7 kg (120 lb 8 oz) 56.1 kg (123 lb 10.9 oz)         Last 3 Recorded Weights in this Encounter    02/04/23 0700 02/05/23 0700 02/06/23 0700   Weight: 56.3 kg (124 lb 1.9 oz) 57.2 kg (126 lb 1.7 oz) 57.9 kg (127 lb 10.3 oz)     Wt Readings from Last 10 Encounters:   02/13/23 56.1 kg (123 lb 10.9 oz) - standing   02/06/23 57.9 kg (127 lb 10.3 oz)   01/25/23 55.8 kg (123 lb)   01/23/23 54.9 kg (121 lb)   01/21/23 54.4 kg (120 lb)   01/20/23 52.2 kg (115 lb)   01/20/23 52.2 kg (115 lb)   01/19/23 53 kg (116 lb 13.5 oz)   12/19/22 58.5 kg (129 lb)   12/16/22 57.4 kg (126 lb 8.7 oz)   12/05/22 59 kg (130 lb)           Nutrition Diagnosis:   Inadequate oral intake (improving as of 2/13/23) related to cardiac dysfunction as evidenced by intake 51-75% (intake %)  Underweight related to inadequate protein-energy intake as evidenced by BMI    Nutrition Interventions:   Food and/or Nutrient Delivery: Continue current diet, Continue oral nutrition supplement, Modify oral nutrition supplement  Nutrition Education/Counseling: Counseling initiated (2/6/23)  Coordination of Nutrition Care: Continue to monitor while inpatient       Goals:  Previous Goal Met: Progressing toward goal(s)  Goals: other (specify)  Specify Other Goals: PO intake >/=75% of most meals until LVAD placement tentative for 2/15; post-op, will tolerate PO diet with PO intake >/=50% by next RD assessment in 1 week    Nutrition Monitoring and Evaluation:   Behavioral-Environmental Outcomes: None identified  Food/Nutrient Intake Outcomes: Food and nutrient intake, Supplement intake  Physical Signs/Symptoms Outcomes: Biochemical data, GI status, Fluid status or edema, Meal time behavior, Nutrition focused physical findings, Weight    Discharge Planning:    Continue oral nutrition supplement    Recent Labs     02/13/23  0407 02/12/23  0422 02/11/23  0431   * 132* 179*   BUN 23* 28* 30*   CREA 1.53* 1.60* 1.62*   * 135* 133*   K 4.6 4.3 4.0    101 99   CO2 27 29 31   CA 9.0 9.3 9.0   PHOS 3.1 3.4 3.4   MG 2.4 2.4 2.4     Recent Labs     02/13/23  1251 02/13/23  0640 02/12/23  2151 02/12/23  1724 02/12/23  1107 02/12/23  0740 02/11/23  2047 02/11/23  1629 02/11/23  1246 02/11/23  0944 02/11/23  0716 02/10/23  2226 02/10/23  2225 02/10/23  2220 02/10/23  1705   GLUCPOC 235* 148* 213* 181* 236* 94 156* 260* 272* 149* 165* 200* 216* 362* 269*       Lab Results   Component Value Date/Time    Hemoglobin A1c 7.7 (H) 01/11/2023 06:14 PM    Hemoglobin A1c 6.5 (H) 12/06/2022 03:53 AM    Hemoglobin A1c 7.0 (H) 10/12/2022 09:10 AM       Dorene Pastor RD  Available via [employer]

## 2023-02-13 NOTE — PROGRESS NOTES
CRITICAL CARE NOTE      Name: Bayron Carvajal   : 1950   MRN: [id number]   Date: 2023      Reason for ICU Admission: Acute on chronic HFrEF, LVAD workup     ICU PROBLEM LIST   Acute on chronic HFrEF (20%) NYHA IIIb/IV (D) on home inotropic support, life vest  TANNER on CKD3  Aflutter w/ RVR  Acute on chronic hypoxemic respiratory failure  CAP  CAD  Gastric neuroendocrine tumor  Hx of LUE DVT  DM  PAD  TRISTAN  BPH w/ urinary retention requiring chronic donnelly    HISTORY OF PRESENT ILLNESS:   Pt is a 71 y.o M w/ PMH as noted above admitted to [facility] on  due to ongoing symptoms secondary to his HFrEF. Treated for possible CAP, and diuresed for acute on chronic HFrEF. Nephrology following for TANNER on CKD 3. AHF team recommended transfer to CVICU for [facility] placement and ongoing LVAD workup with plan for placement 2/15.     24 HOUR EVENTS:   No acute events o/n, sitting in bedside chair this AM, NAD. Approx 700ml net +ve after diuretics held yesterday, good UOP. NEUROLOGICAL:    Mentation appropriate  Delirium precautions  Encourage work w/ PT/OT    PULMONOLOGY:   O2 per NC PRN for spO2 >92%    CARDIOVASCULAR:   Scheduled and PRN bumex  Goal net negative  C/w milrinone, dobutamine  Telemetry, close hemodynamic monitoring  Unable to tolerate BB, ARNI/ARB due to , aldactone held 2/2 elevated K   C/w ASA, amiodarone, statin  Will need Life Vest replaced  Ongoing LVAD workup by AHF team, plan for LVAD placement 2/15  Monitor for signs of hypoperfusion, monitor UOP    GASTROINTESTINAL:   PPI  Cardiac, renal, diabetic diet as tolerated      RENAL/ELECTROLYTE/FLUIDS:   Strict I/Os  Nephrology following  Monitor RFP  Mg/Phos/K >2/3/4  Donnelly to remain in place  PRN bumex    ENDOCRINE:   SSI, Basal insulin  Hypoglycemic protocol  Glucose goal 120-180    HEMATOLOGY/ONCOLOGY:   On eliquis  EPO weekly  Gastric neuroendocrine tumor, well differentiated, good prognosis per onc.    Not barrier to LVAD implant    ID/MICRO: S/p CAP treatment     ICU DAILY CHECKLIST     Code Status:Full  DVT Prophylaxis:Eliquis  T/L/D: PIV,  Vasquez  SUP: PPI  Diet: ADAT  Activity Level:ad jose f  ABCDEF Bundle/Checklist Completed:Yes  Disposition: Stay in ICU, likely TTF in AM  Multidisciplinary Rounds Completed:  yes  Patient/Family Updated: Yes    [facility]   2/3 Transferred to CVICU for [facility] placement  2/7 started on dobutamine as adjunct to milrinone    SUBJECTIVE:     As noted above    Review of Systems:     Review of Systems   Constitutional:  Positive for malaise/fatigue. Negative for chills and fever. HENT:  Negative for congestion and sinus pain. Eyes:  Negative for blurred vision, double vision and pain. Respiratory:  Positive for shortness of breath (exertional, improved). Negative for hemoptysis and sputum production. Cardiovascular:  Negative for chest pain, palpitations and leg swelling. Gastrointestinal:  Negative for abdominal pain, nausea and vomiting. Genitourinary:  Negative for dysuria, frequency and urgency. Musculoskeletal:  Negative for back pain, joint pain and myalgias. Skin:  Negative for itching and rash. Neurological:  Negative for tremors, sensory change, speech change and focal weakness. Psychiatric/Behavioral:  Negative for hallucinations. The patient is not nervous/anxious.        OBJECTIVE:     Labs and Data: Reviewed 02/13/23  Medications: Reviewed 02/13/23  Imaging: Reviewed 02/13/23    General - chronically ill, sarcopenia, OOBTC  HEENT- pupils equal, EOMI, anicteric  Neuro - moves all extrem, follows basic commands, no focal deficit  CV - RRR, systolic murmur  Resp - rales b/l, NC  Abd - soft, nontender, no guarding  MSK- intact, no sacral ulcer      Visit Vitals  BP (!) 113/52 (BP 1 Location: Left upper arm, BP Patient Position: At rest)   Pulse 84   Temp 97.7 °F (36.5 °C)   Resp 26   Ht 5' 6\" (1.676 m) Comment: per wife   Wt 56.1 kg (123 lb 10.9 oz)   SpO2 94%   BMI 19.96 kg/m²    O2 Flow Rate (L/min): 2 l/min O2 Device: None (Room air) Temp (24hrs), Av.9 °F (36.6 °C), Min:97.7 °F (36.5 °C), Max:98.3 °F (36.8 °C)    CVP (mmHg): 7 mmHg (23 0800)      Intake/Output:     Intake/Output Summary (Last 24 hours) at 2023 3826  Last data filed at 2023 5100  Gross per 24 hour   Intake 1411.39 ml   Output 1300 ml   Net 111.39 ml         Imaging    23    ECHO ADULT FOLLOW-UP OR LIMITED 2023    Interpretation Summary    Left Ventricle: EF by visual approximation is 20%. Left ventricle is mildly dilated. Mitral Valve: Moderately thickened leaflet, at the anterior and posterior leaflets. Moderately calcified leaflet. Moderate regurgitation. Left Atrium: Left atrium is moderately dilated. Technical qualifiers: Echo study was technically difficult with poor endocardial visualization. Contrast used: Definity. Limited study, not all structures viewed    Signed by: Estefani Page MD on 2023  4:39 PM       Pertinent imaging reviewed and interpreted as noted above    CRITICAL CARE DOCUMENTATION  I had a face to face encounter with the patient, reviewed and interpreted patient data including clinical events, labs, images, vital signs, I/O's, and examined patient. I have discussed the case and the plan and management of the patient's care with the consulting services, the bedside nurses and the respiratory therapist.      NOTE OF PERSONAL INVOLVEMENT IN CARE   This patient has a high probability of imminent, clinically significant deterioration, which requires the highest level of preparedness to intervene urgently. I participated in the decision-making and personally managed or directed the management of the following life and organ supporting interventions that required my frequent assessment to treat or prevent imminent deterioration. I personally spent 35 minutes of critical care time.   This is time spent at this critically ill patient's bedside actively involved in patient care as well as the coordination of care. This does not include any procedural time which has been billed separately. Walker Gao MD  Staff [facility]

## 2023-02-14 ENCOUNTER — TELEPHONE (OUTPATIENT)
Dept: CARDIOLOGY CLINIC | Age: 73
End: 2023-02-14

## 2023-02-14 NOTE — PROGRESS NOTES
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 Edmond Sy  Patient presented with symptomatic SVT (a-flutter) with RVR converted to sinus tach after amio loading; in the setting of pneumonia and likely intolerance to inotropes  Patient completed remainder of evaluation for LVAD for destination therapy while in-patient and treatment of pneumonia; during LVAD eval, found to have well-differentiated neuroendocrine tumor on gastric body and gastric polyp, G1, per oncology treatable tumor with good prognosis and would not preclude LVAD.   Patient was approved for LVAD implantation as destination therapy at [facility] 2/3/23 with tentative plans to proceed to OR on 2/15/23; currently undergoing SGC-guided optimization in CV-ICU   The following have been identified as relative concerns pertaining to LVAD candidacy: small body surface area, cachexia, BMI 18, malnutrition, frailty, advanced age, muscular deconditioning, PVD (fingertips), urinary retention requiring donnelly catheter, neuroendocrine tumor class 1 requiring treatment after LVAD, heal injury requiring wound care consult, mild neurocognitive dysfunction, chronic kidney disease and hearing loss requiring hearing aid       RECOMMENDATIONS:  Decrease milrinone to 0.2 mcg/kg/min and discontinue at 11:00, d/w bedside staff  Check baseline NICOM in the morning and at 16:00  Continue dobutamine 2 mcg/kg/min  No beta-blockers due to hypotension and RV conditioning prior to LVAD  Cannot tolerate ARB/ARNi due to hypotension and upcoming surgical procedure   Cannot tolerate spironolactone due to hyperkalemia  Cannot tolerate jardiance due to significant diuresis on smallest dose/contributed to IVVD  Discontinued corlanor due to SVT  Digoxin stopped d/t risk for toxicity with amio loading  Continue amiodarone, appreciate EP cardiology recs  Resume Bumex 1mg daily  Appreciate nephrology recommendations   Continue Mag ox 400 mg daily   Keep K+ >4 and Mg >2  Continue eliquis 5mg twice daily- will stop Eliquis on Monday .  No bridging per Dr. Casi Keith   S/p venofer 200mg x 2 doses  Transfuse to keep hgb closer to 8 for potential surgical procedure  Abx/treatment of suspected PNA per hospitalist  Continue lifevest  Continue baby aspirin   Give albumin once  pCXR preop  Plavix previously stopped, okayed by Dr. Jeaneth Talley consult pending (high risk for TRISTAN)  Heel pressure ulcer- wound care consulted  VAD coordinator to provide education  Focus on physical therapy and ambulate daily  Incentive spirometry and wean oxygen NC  Nutritionist consultation   consultation and daily support; help with hearing aids  Appreciate Palliative care recommendations   Tentative OR date 2/15/23  Give 1 dose Vit K on Tuesday 2/14- ordered   Will place lines in OR  Blood products and antibiotics ordered   Will have consents signed today  Family meeting with wife, son, patient, VAD coordinator and SW held on Friday re: anticipated perioperative course and risks and expectations of living with LVAD and complications; all questions answered to satisfaction  Ambulate daily and nutrition daily  Awaiting preauthorization     All other care per primary team      INTERVAL HISTORY:  Feels great  Ambulated twice in outside the unit  Hemodynamics stable   Milrinone 0.375  Dobutamine 2mcg  I/O net negative 1.8 liters, urine 2 liters  2 liters O2  Hgb stable 10.2 from 9.7  Cr stable at 1.49 from 1.53  On RA  Ambulating and eating well       IMPRESSION:  Acute on chronic combined systolic/diastolic  heart failure  Stage D, NYHA class IV symptoms   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, LVEF 20% (by echo 1/2023) and 23% (by cMRI 1/3/23)  Cardiac MRI suggestive of ischemic cardiomyopathy  PYP equivocal  Chronic milrinone and dobutamine infusion as palliation   Coronary artery disease  CAD s/p CABG x 2: further disease best managed medically due to small vessel size   At risk of sudden cardiac death  Peripheral arterial disease  Bilateral hydronephrosis s/p donnelly  Cardiac risk factors:  HTN  HL  TRISTAN, STOP-BANG 4  DM2  CKD, stage 3  MOCA from 2/9 19/30, consistent with mild cognitive impairment  Hard of hearing  Gastric Neuroendocrine Tumor, elevated gastrin and chromogranin A  Sepsis, unclear source- resolved         LIFE GOALS:  Lifestyle goals reviewed with the patient. Patient's personal goals include: having a few more years with family  Important upcoming milestones or family events: None at this time   The patient identifies the following as important for living well: being at home, not being SOB              CARDIAC IMAGING:  Echo 1/27/23    Left Ventricle: EF by visual approximation is 20%. Left ventricle is mildly dilated. Mitral Valve: Moderately thickened leaflet, at the anterior and posterior leaflets. Moderately calcified leaflet. Moderate regurgitation. Left Atrium: Left atrium is moderately dilated. Technical qualifiers: Echo study was technically difficult with poor endocardial visualization. Contrast used: Definity. Limited study, not all structures viewed     Echo 1/9/23   Left Ventricle: Severely reduced left ventricular systolic function with a visually estimated EF of 25 - 30%. Left ventricle size is normal. Mildly increased wall thickness.      Echo 12/26/22    Left Ventricle: Severely reduced left ventricular systolic function with a visually estimated EF of 25 - 30%. Left ventricle size is normal. Normal wall thickness. There are regional wall motion abnormalities. Grade II diastolic dysfunction with increased LAP. Right Ventricle: Moderately reduced systolic function. TAPSE is abnormal. TAPSE is 1.1 cm. Aortic Valve: Mild stenosis of the aortic valve. AV peak gradient is 13 mmHg. AV peak velocity is 1.8 m/s. Mitral Valve: Not well visualized. Moderate annular calcification at the posterior leaflet of the mitral valve. Mild to moderate regurgitation. Tricuspid Valve: Mildly elevated RVSP. Left Atrium: Left atrium is moderately dilated. 12/8/22    Left Ventricle: Moderately reduced left ventricular systolic function with a visually estimated EF of 35 - 40%. Severe hypokinesis of the following segments: mid anteroseptal, apical anterior, apical septal, apical inferior and apical lateral. Severe hypokinesis of the apex. Mitral Valve: Severely thickened leaflet, at the anterior and posterior leaflets. Severely calcified leaflet, at the anterior and posterior leaflets. Mild annular calcification of the mitral valve. Moderate regurgitation. Left Atrium: Left atrium is mildly dilated. Contrast used: Definity. limited study     EKG 12/22/22 ST, Biatria enlargement, marked ST abnormality     C 12/6/22  1. Normal LVEDP  2. Severe native multivessel coronary artery disease  3. Patent LIMA to LAD and vein graft to distal RCA  4. Recurrent ISR in OM1 stent with now 60 to 70% restenosis  5. Recoil of left main and circumflex stent with now recurrent 40 to 50% stenosis. 6.  Progression of ostial left main disease now to about 60% stenosis  7. Progression of disease in jailed first marginal branch now with diffuse 90% stenosis  8.   High-grade stenosis in the mid to distal right potential femoral artery treated with 6 x 40 mm impact drug-coated balloon angioplasty to reduce the stenosis to less than 40%        HEMODYNAMICS:  RHC 1/9/23  PA 20/9, RA 3, PCWP 8, CI 1.8     CPEST too ill   6MW 300 feet    PHYSICAL EXAM:  Visit Vitals  BP (!) 122/56   Pulse 84   Temp 97.7 °F (36.5 °C)   Resp 25   Ht 5' 6\" (1.676 m)   Wt 124 lb 9 oz (56.5 kg)   SpO2 96%   BMI 20.10 kg/m²     General: Patient is well developed, well-nourished in no acute distress  HEENT: Unremarkable   Neck: Supple. No evidence of thyroid enlargements or lymphadenopathy. JVD: Cannot be appreciated   Lungs: Breath sounds are equal and clear bilaterally. No wheezes, rhonchi, or rales. Heart: Regular rate and rhythm with normal S1 and S2. No murmurs, gallops or rubs. Abdomen: Soft, no mass or tenderness. No organomegaly or hernia. Bowel sounds present. Genitourinary and rectal: deferred  Extremities: No cyanosis, clubbing, or edema. Neurologic: No focal sensory or motor deficits are noted. Grossly intact. Psychiatric: Awake, alert an doriented x 3. Appropriate mood and affect. Skin: Warm, dry and well perfused. REVIEW OF SYSTEMS:  General: Denies fever. Ear, nose and throat: Denies difficulty hearing, sinus problems, nosebleeds  Cardiovascular: see above in the interval history  Respiratory: Denies cough, wheezing, sputum production, hemoptysis.   Gastrointestinal: Denies heartburn, constipation, diarrhea, abdominal pain, nausea, blood in stool  Kidney and bladder: Denies painful urination, frequent urination  Musculoskeletal: Denies joint pain, muscle weakness  Skin and hair: Denies change in existing skin lesions    PAST MEDICAL HISTORY:  Past Medical History:   Diagnosis Date    CAD (coronary artery disease) 11/10/2016    NSTEMI & 2 stents    Deafness 10/28/2012    DM (diabetes mellitus) ([facility] [address]     Elevated cholesterol     Hypertension     NSTEMI (non-ST elevated myocardial infarction) ([facility] [address] 11/10/2016       PAST SURGICAL HISTORY:  Past Surgical History:   Procedure Laterality Date COLONOSCOPY N/A 6/28/2018    COLONOSCOPY performed by Alonso Boggs MD at [facility] ENDOSCOPY    COLONOSCOPY N/A 1/18/2023    COLONOSCOPY performed by Angella White MD at [facility] ENDOSCOPY    [address]  11/11/2016    2 stents       FAMILY HISTORY:  Family History   Problem Relation Age of Onset    Heart Disease Father     Heart Attack Father     Hypertension Mother     Elevated Lipids Brother     Elevated Lipids Brother     No Known Problems Sister     Elevated Lipids Brother     No Known Problems Son     No Known Problems Daughter     Anesth Problems Neg Hx        SOCIAL HISTORY:  Social History     Socioeconomic History    Marital status:    Tobacco Use    Smoking status: Never     Passive exposure: Never    Smokeless tobacco: Never   Vaping Use    Vaping Use: Never used   Substance and Sexual Activity    Alcohol use: Yes     Alcohol/week: 2.0 standard drinks     Types: 1 Cans of beer, 1 Shots of liquor per week     Comment: rarely    Drug use: No    Sexual activity: Yes     Social Determinants of Health     Financial Resource Strain: Medium Risk    Difficulty of Paying Living Expenses: Somewhat hard   Food Insecurity: Food Insecurity Present    Worried About Running Out of Food in the Last Year: Never true    Ran Out of Food in the Last Year: Often true       LABORATORY RESULTS:     Labs Latest Ref Rng & Units 2/14/2023 2/13/2023 2/12/2023 2/11/2023 2/10/2023 2/9/2023 2/8/2023   WBC 4.1 - 11.1 K/uL 7.1 8.1 5.1 4.9 5.8 5.7 -   RBC 4.10 - 5.70 M/uL 4.13 3.85(L) 3.71(L) 3.57(L) 3.77(L) 3.56(L) -   Hemoglobin 12.1 - 17.0 g/dL 10. 2(L) 9.7(L) 9.3(L) 9.2(L) 9.5(L) 8. 9(L) -   Hematocrit 36.6 - 50.3 % 35. 8(L) 32. 9(L) 32. 2(L) 30. 0(L) 32. 5(L) 30. 4(L) -   MCV 80.0 - 99.0 FL 86.7 85.5 86.8 84.0 86.2 85.4 -   Platelets 737 - 036 K/uL 230 221 211 198 192 177 -   Lymphocytes 12 - 49 % 16 11(L) 20 22 - 19 -   Monocytes 5 - 13 % 7 6 8 8 - 10 -   Eosinophils 0 - 7 % 4 2 5 5 - 4 - Basophils 0 - 1 % 1 1 1 1 - 0 -   Albumin 3.5 - 5.0 g/dL 3. 3(L) 3. 1(L) 2. 9(L) 2. 9(L) 3.0(L) 2. 8(L) -   Calcium 8.5 - 10.1 MG/DL 9.8 9.0 9.3 9.0 8.9 9.0 9.3   Glucose 65 - 100 mg/dL 147(H) 173(H) 132(H) 179(H) 180(H) 189(H) 129(H)   BUN 6 - 20 MG/DL 28(H) 23(H) 28(H) 30(H) 29(H) 31(H) 31(H)   Creatinine 0.70 - 1.30 MG/DL 1.49(H) 1.53(H) 1.60(H) 1.62(H) 1.60(H) 1.55(H) 1.54(H)   Sodium 136 - 145 mmol/L 133(L) 134(L) 135(L) 133(L) 134(L) 133(L) 136   Potassium 3.5 - 5.1 mmol/L 4.4 4.6 4.3 4.0 4.5 4.4 4.0   TSH 0.36 - 3.74 uIU/mL - - - - - - -   PSA 0.01 - 4.0 ng/mL - - - - - - -   LDH 85 - 241 U/L - - - - - - -   Some recent data might be hidden     Lab Results   Component Value Date/Time    TSH 3.52 01/28/2023 05:26 AM    TSH 2.12 12/27/2022 02:36 PM    TSH 4.80 (H) 12/06/2022 03:53 AM    TSH 5.39 (H) 10/12/2022 09:10 AM    TSH 3.53 02/03/2022 11:47 AM    TSH 5.790 (H) 11/21/2019 04:45 PM    TSH 3.08 06/22/2018 01:53 PM    TSH 4.250 05/26/2015 09:43 AM       ALLERGY:  Allergies   Allergen Reactions    Ambien [Zolpidem] Other (comments)     Causes extreme confusion        CURRENT MEDICATIONS:    Current Facility-Administered Medications:     bumetanide (BUMEX) tablet 1 mg, 1 mg, Oral, DAILY, Shaila, Lizette B, NP, 1 mg at 02/14/23 0818    mupirocin (BACTROBAN) 2 % ointment 1 g, 1 g, Both Nostrils, BID, Shaila, Lizette B, NP    chlorhexidine (PERIDEX) 0.12 % mouthwash 15 mL, 15 mL, Oral, Q12H, Shaila, Lizette B, NP, 15 mL at 02/14/23 0855    [START ON 2/15/2023] rifAMPin (RIFADIN) 600 mg in 0.9% sodium chloride 100 mL IVPB, 600 mg, IntraVENous, ONCE, Shaila, Lizette B, NP    [START ON 2/15/2023] fluconazole (DIFLUCAN) 200mg/100 mL IVPB (premix), 200 mg, IntraVENous, ONCE, Shaila, Lizette B, NP    [START ON 2/15/2023] levoFLOXacin (LEVAQUIN) 500 mg in D5W IVPB, 500 mg, IntraVENous, ONCE, Shaila, Lizette B, NP    0.9% sodium chloride infusion 250 mL, 250 mL, IntraVENous, PRN, Shaila, Lizette B, NP    phytonadione (vitamin K1) (AQUA-MEPHYTON) 10 mg in 0.9% sodium chloride 50 mL IVPB, 10 mg, IntraVENous, ONCE, Lalo Lintonin B, NP    milrinone (PRIMACOR) 20 MG/100 ML D5W infusion, 0.375 mcg/kg/min, IntraVENous, CONTINUOUS, Mike Kirk MD, Last Rate: 3.3 mL/hr at 02/14/23 0850, 0.2 mcg/kg/min at 02/14/23 0850    DOBUTamine (DOBUTREX) 500 mg/250 mL (2,000 mcg/mL) infusion, 2 mcg/kg/min, IntraVENous, CONTINUOUS, Mike Kirk MD, Last Rate: 3.3 mL/hr at 02/14/23 0110, 2 mcg/kg/min at 02/14/23 0110    epoetin heidy-epbx (RETACRIT) injection 10,000 Units, 10,000 Units, SubCUTAneous, Q7D, Desire Dodd MD, 10,000 Units at 02/11/23 1630    insulin lispro (HUMALOG) injection 8 Units, 8 Units, SubCUTAneous, TID WITH MEALS, Cathy Sheldon CNS, 8 Units at 02/14/23 0818    insulin glargine (LANTUS) injection 6 Units, 6 Units, SubCUTAneous, DAILY, Cathy Sheldon CNS, 6 Units at 02/14/23 0818    albuterol-ipratropium (DUO-NEB) 2.5 MG-0.5 MG/3 ML, 3 mL, Nebulization, Q4H PRN, Vidya MOLINA MD, 3 mL at 02/06/23 1539    magnesium oxide (MAG-OX) tablet 400 mg, 400 mg, Oral, DAILY, Lalo Lintonin B, NP, 400 mg at 02/13/23 0831    0.9% sodium chloride infusion, 6 mL/hr, IntraVENous, CONTINUOUS, Mike Kirk MD, Stopped at 02/13/23 1900    0.9% sodium chloride infusion 250 mL, 250 mL, IntraVENous, PRN, Shaila Lizette B, NP    alteplase (CATHFLO) 1 mg in sterile water (preservative free) 1 mL injection, 1 mg, InterCATHeter, PRN, Shaila, Lizette B, NP    bacitracin 500 unit/gram packet 1 Packet, 1 Packet, Topical, PRN, Shaila, Lizette B, NP, 1 Packet at 02/13/23 1828    bumetanide (BUMEX) injection 1 mg, 1 mg, IntraVENous, Q4H PRN, Shaila, Lizette B, NP, 1 mg at 02/06/23 1349    senna-docusate (PERICOLACE) 8.6-50 mg per tablet 1 Tablet, 1 Tablet, Oral, DAILY PRN, Bud Loss B, NP, 1 Tablet at 02/14/23 0834    traZODone (DESYREL) tablet 50 mg, 50 mg, Oral, QHS PRN, Gayathri Ramirez MD, 50 mg at 02/13/23 2121    glucose chewable tablet 16 g, 4 Tablet, Oral, PRN, Kevin Gilbert NP    glucagon (GLUCAGEN) injection 1 mg, 1 mg, IntraMUSCular, PRN, Ita Harvey NP    dextrose 10 % infusion 0-250 mL, 0-250 mL, IntraVENous, PRN, Ita Omer NP    insulin lispro (HUMALOG) injection, , SubCUTAneous, AC&HS, Kevin Gilbert NP, 2 Units at 02/13/23 1923    balsam peru-castor oiL (VENELEX) ointment, , Topical, BID, Ethyl MD Ion, Given at 02/14/23 0818    lidocaine 4 % patch 1 Patch, 1 Patch, TransDERmal, Q24H, Gayathri Ramirez MD, 1 Patch at 02/10/23 1201    amiodarone (CORDARONE) tablet 400 mg, 400 mg, Oral, BID, Rupinder Schroeder MD, 400 mg at 02/14/23 0818    aspirin delayed-release tablet 81 mg, 81 mg, Oral, DAILY, Heber Barajas MD, 81 mg at 02/14/23 0818    sodium chloride (NS) flush 5-40 mL, 5-40 mL, IntraVENous, Q8H, Heber Barajas MD, 10 mL at 02/14/23 0715    sodium chloride (NS) flush 5-40 mL, 5-40 mL, IntraVENous, PRN, Heber Barajas MD    acetaminophen (TYLENOL) tablet 650 mg, 650 mg, Oral, Q6H PRN, 650 mg at 02/13/23 1814 **OR** acetaminophen (TYLENOL) suppository 650 mg, 650 mg, Rectal, Q6H PRN, Heber Barajas MD    polyethylene glycol (MIRALAX) packet 17 g, 17 g, Oral, DAILY PRN, Heber Barajas MD, 17 g at 02/14/23 0834    ondansetron (ZOFRAN ODT) tablet 4 mg, 4 mg, Oral, Q8H PRN, 4 mg at 01/30/23 1357 **OR** ondansetron (ZOFRAN) injection 4 mg, 4 mg, IntraVENous, Q6H PRN, Heber Barajas MD, 4 mg at 02/03/23 1926    pantoprazole (PROTONIX) tablet 40 mg, 40 mg, Oral, ACB, Heber Barajas MD, 40 mg at 02/14/23 0715    rosuvastatin (CRESTOR) tablet 20 mg, 20 mg, Oral, QHS, Heber Barajas MD, 20 mg at 02/13/23 2121    PATIENT CARE TEAM:  Patient Care Team:  Moni Soria MD as PCP - General (Family Medicine)  Moni Soria MD as PCP - [facility]  Parag Fernandez MD (Cardiovascular Disease Physician)  Jm Baumann MD (Gastroenterology)  Jaleesa Guo MD (Cardiothoracic Surgery)  Jen Hidalgo MD (Cardiovascular Disease Physician)  Fozia Paniagua MD (Nephrology)  Andrei Mcgrath, RN as Care Transitions Nurse  Trisha Flores MD (Pulmonary Disease)  Jerry Gross MD ([address] [place] [address] Vascular Surgery)     Thank you for allowing me to participate in this patient's care.     Prabhu Bond MD   [address] [place] [address], [address]  Phone: [phone]

## 2023-02-14 NOTE — PROGRESS NOTES
Problem: Mobility Impaired (Adult and Pediatric)  Goal: *Acute Goals and Plan of Care (Insert Text)  Description: FUNCTIONAL STATUS PRIOR TO ADMISSION: Patient was modified independent using a rollator for functional mobility. Pt recently d/c home after previous hospital stay. Able to ambulate community distances. HOME SUPPORT PRIOR TO ADMISSION: The patient lived with spouse but did not require assist.    Physical Therapy Goals  Updated 2/13/2023  1. Patient will move from supine to sit and sit to supine in bed with modified independence within 7 day(s). MET 2/13  2. Patient will transfer from bed to chair and chair to bed with modified independence using the least restrictive device within 7 day(s). MET 2/13  3. Patient will perform sit to stand with modified independence within 7 day(s). MET 2/13  4. Patient will ambulate with modified independence for 300 feet with the least restrictive device within 7 day(s). MET 2/13  5. Patient will ascend/descend 12 stairs with 1 handrail(s) with supervision/set-up within 7 day(s). 6.  Patient will verbally and functionally recall move in the tube techniques in prep for possible LVAD within 7 days. Physical Therapy Goals  Initiated 1/28/2023-- Goals appropriate and add #6 2/6/2023  1. Patient will move from supine to sit and sit to supine  in bed with modified independence within 7 day(s). 2.  Patient will transfer from bed to chair and chair to bed with modified independence using the least restrictive device within 7 day(s). 3.  Patient will perform sit to stand with modified independence within 7 day(s). 4.  Patient will ambulate with modified independence for 300 feet with the least restrictive device within 7 day(s). 5.  Patient will ascend/descend 12 stairs with 1 handrail(s) with supervision/set-up within 7 day(s). 6.  Patient will verbally and functionally recall move in the tube techniques in prep for possible LVAD within 7 days.    Outcome: Progressing Towards Goal   PHYSICAL THERAPY TREATMENT  Patient: Nikki Rojas (76 y.o. male)  Date: 2/14/2023  Diagnosis: CHF (congestive heart failure) ([facility]) [I50.9] <principal problem not specified>  Procedure(s) (LRB):  LEFT VENTRICULAR ASSIST DEVICE HEARTMATE 3 PERMANENT (Left)    Precautions: Fall  Chart, physical therapy assessment, plan of care and goals were reviewed. ASSESSMENT  Patient continues with skilled PT services and is progressing towards goals. Patient was able to participate in gait training this date with rollator and on room air, however was more dyspneic than previous sessions (milrinone off at 11am today). No LOB or instability with ambulation. Able to recall LVAD term \"driveline\" but not full recall of \". \" Reviewed therapy POC and progression after surgery. Also reviewed transfers with move in the tube technique. To go for LVAD tomorrow. Will re-evaluate when appropriate. Current Level of Function Impacting Discharge (mobility/balance): mod I-SBA     Other factors to consider for discharge: LVAD tomorrow          PLAN :  Patient continues to benefit from skilled intervention to address the above impairments. Continue treatment per established plan of care. to address goals. Recommendation for discharge: (in order for the patient to meet his/her long term goals)  To be determined: post LVAD     This discharge recommendation:  Has been made in collaboration with the attending provider and/or case management    IF patient discharges home will need the following DME: to be determined (TBD)       SUBJECTIVE:   Patient stated I'm anxious.     OBJECTIVE DATA SUMMARY:   Critical Behavior:  Neurologic State: Alert  Orientation Level: Oriented X4  Cognition: Appropriate decision making, Appropriate for age attention/concentration, Appropriate safety awareness, Follows commands  Safety/Judgement: Awareness of environment, Fall prevention, Insight into deficits  Functional Mobility Training:  Transfers:  Sit to Stand: Modified independent  Stand to Sit: Modified independent  Balance:  Sitting: Intact  Standing: Intact; With support  Ambulation/Gait Training:  Distance (ft): 500 Feet (ft)  Assistive Device: Walker, rollator  Ambulation - Level of Assistance: Stand-by assistance  Gait Abnormalities: Decreased step clearance  Base of Support: Widened  Speed/Bette: Pace decreased (<100 feet/min)  Step Length: Right shortened;Left shortened    Activity Tolerance:   Good, SpO2 stable on RA, requires frequent rest breaks, and observed SOB with activity    After treatment patient left in no apparent distress:   Sitting in chair, Call bell within reach, and Caregiver / family present    COMMUNICATION/COLLABORATION:   The patients plan of care was discussed with: Occupational therapist and Registered nurse.      Katina Rodrigues PT, DPT   Time Calculation: 14 mins

## 2023-02-14 NOTE — PROGRESS NOTES
RENAL  PROGRESS NOTE        Subjective:   Sitting in chair; Wife/brother at the bedside  Feels OK  No acute c/o    Objective:   VITALS SIGNS:    Visit Vitals  BP (!) 122/58 (BP 1 Location: Left upper arm, BP Patient Position: At rest)   Pulse 96   Temp 97.1 °F (36.2 °C)   Resp 21   Ht 5' 6\" (1.676 m)   Wt 56.5 kg (124 lb 9 oz)   SpO2 97%   BMI 20.10 kg/m²       O2 Device: None (Room air)   O2 Flow Rate (L/min): 2 l/min   Temp (24hrs), Av.8 °F (36.6 °C), Min:97.1 °F (36.2 °C), Max:98.2 °F (36.8 °C)         PHYSICAL EXAM:  NAD  Clear  Regular rate rhythm   Trace ankle edema  AOx3    DATA REVIEW:     INTAKE / OUTPUT:   Last shift:       0701 -  1900  In: 59.2 [I.V.:59.2]  Out: -   Last 3 shifts: 1901 -  0700  In: 385.5 [I.V.:385.5]  Out: 2350 [Urine:2350]    Intake/Output Summary (Last 24 hours) at 2023 1251  Last data filed at 2023 1100  Gross per 24 hour   Intake 232.18 ml   Output 1250 ml   Net -1017.82 ml           LABS:   Recent Labs     23  0357 23  0407 23  0422   WBC 7.1 8.1 5.1   HGB 10.2* 9.7* 9.3*   HCT 35.8* 32.9* 32.2*    221 211       Recent Labs     23  0340 23  0407 23  0422   * 134* 135*   K 4.4 4.6 4.3    103 101   CO2 26 27 29   * 173* 132*   BUN 28* 23* 28*   CREA 1.49* 1.53* 1.60*   CA 9.8 9.0 9.3   MG 2.5* 2.4 2.4   PHOS 3.9 3.1 3.4   ALB 3.3* 3.1* 2.9*   TBILI 0.7 0.6 0.5   ALT 22 22 25           Assessment:  TANNER on CKD-3a: Suspect cardiorenal effects with needed diuresis. Hypotension/poor renal perfusion likely culprit. Hyperkalemia: better     Ischemic CM: Recurrent exacerbations. EF 20%. On continuous Milrinone since last admission. BPH: s/p donnelly     Well differentiated NET Grade 1: noted on EGD 23     Anemia 2 to CKD:Hgb sub-optimal     Hyponatremia: mild-> better       Left UE basilic and radial vein thrombus    TANNER has resolved. CR is back to baseline. Plan/Recommendations:  Continue current treatment including milrinone  IV dobutamine  Continue Bumex  For LVAD tomm    Discussed with patient and wife/brother    Lowell Machado MD

## 2023-02-14 NOTE — TELEPHONE ENCOUNTER
Called back at 4:15  spoke with Frank Plaza  trying get authorization expedited.  No   name or # was  given they are aware LVAD should be expedited

## 2023-02-14 NOTE — PROGRESS NOTES
[facility] in [place], [place]         Met with family and patient to review \"Request and Consent for Ventricular Assist Device (VAD) Implantation Bridge to Transplant-Btt or Destination Therapy-DT\" Read document out loud with patient, patient's wife, daughter, brother and son-in-law. Answered patient and family's  questions. Patient alert and oriented for discussion. Denied questions at end of visit. Signed document with RN vad coordinator witness.

## 2023-02-14 NOTE — PROGRESS NOTES
Cardiac Surgery Coordinator- Met briefly with Bayron Carvajal and his family, reinforced pre-op education and day of surgery expectations. They are without questions or concerns at this time. Offered emotional support. Dr Miriam Farrell at the bedside.  Shiva Perez RN

## 2023-02-14 NOTE — PROGRESS NOTES
0800: Bedside and Verbal shift change report given to Montserrat SANDOVAL (oncoming nurse) by Saimr Jefferson (offgoing nurse). Report included the following information SBAR and Cardiac Rhythm NSR .     0845: Dr Allie Horta at bedside - verbal order to wean Milrinone to have off by 1100.    1042: NICOM: CO 3.5, CI 2.1, SVI 23, SVV 18, TPR 1638    1121: Milrinone off    1534: NICOM: CO 5.6, CI 3.4, SVI 35, SVV 20, . Updated Dr Allie Horta - pt reporting increased SOB since this afternoon and last walk. Telephone orders for 1mg IV Bumex now. Provided education regarding HF symptoms and diet - pt and family report not receiving education on HF diet prior to today. 1600: HF team at bedside completing consents. 2000: Bedside and Verbal shift change report given to Monie Castorena (oncoming nurse) by Charisma Butler (offgoing nurse). Report included the following information SBAR and Cardiac Rhythm Sinus Tach .

## 2023-02-14 NOTE — PROGRESS NOTES
Transitions of Care Plan    RUR: 27% - high  Clinical Update: LVAD this admission - op date is 2/15  Consults: Multiple  Baseline: ambulates w RW; resides w wife  Barrier(s) to Discharge: medical  Disposition:   Home Health: [address]  DME: Blaine Barksdale - will need to return  Estimated Discharge Date: 2+ days     Clinical update per chart review and/or patient discussed during Interdisciplinary Rounds:     Patient is not medically stable for discharge due to ongoing medical needs. CM continues to follow treatment plan for medical progress and disposition needs.      Disposition:  Anticipate home health after LVAD recovery    Haider Ellington, [address]  Available via [facility] or

## 2023-02-14 NOTE — PROGRESS NOTES
CRITICAL CARE NOTE      Name: Tobias Campos   : 1950   MRN: [id number]   Date: 2023      Reason for ICU Admission: Acute on chronic HFrEF, LVAD workup     ICU PROBLEM LIST   Acute on chronic HFrEF (20%) NYHA IIIb/IV (D) on home inotropic support, life vest  TANNER on CKD3  Aflutter w/ RVR  Acute on chronic hypoxemic respiratory failure  CAP  CAD  Gastric neuroendocrine tumor  Hx of LUE DVT  DM  PAD  TRISTAN  BPH w/ urinary retention requiring chronic donnelly    HISTORY OF PRESENT ILLNESS:   Pt is a 71 y.o M w/ PMH as noted above admitted to [facility] on  due to ongoing symptoms secondary to his HFrEF. Treated for possible CAP, and diuresed for acute on chronic HFrEF. Nephrology following for TANNER on CKD 3. F team recommended transfer to CVICU for [facility] placement and ongoing LVAD workup with plan for placement 2/15.     24 HOUR EVENTS:   Milrinone wean today, otherwise no acute changes. To OR tomorrow for LVAD placement. PICC line removed yesterday. Good UOP, net negative 1800ml    NEUROLOGICAL:    Mentation appropriate  Delirium precautions  Encourage work w/ PT/OT    PULMONOLOGY:   O2 per NC PRN for spO2 >92%    CARDIOVASCULAR:   Scheduled and PRN bumex  Goal net negative  C/w  dobutamine, milrinone wean to off per F  Telemetry, close hemodynamic monitoring  Unable to tolerate BB, ARNI/ARB due to , aldactone held 2/2 elevated K   C/w ASA, amiodarone, statin  Will need Life Vest replaced  Ongoing LVAD workup by AHF team, plan for LVAD placement 2/15  Monitor for signs of hypoperfusion, monitor UOP    GASTROINTESTINAL:   PPI  Cardiac, renal, diabetic diet as tolerated      RENAL/ELECTROLYTE/FLUIDS:   Strict I/Os  Nephrology following  Monitor RFP  Mg/Phos/K >2/3/4  Donnelly to remain in place  PRN bumex    ENDOCRINE:   SSI, Basal insulin  Hypoglycemic protocol  Glucose goal 120-180    HEMATOLOGY/ONCOLOGY:   On eliquis  EPO weekly  Gastric neuroendocrine tumor, well differentiated, good prognosis per onc.    Not barrier to LVAD implant    ID/MICRO:   S/p CAP treatment     ICU DAILY CHECKLIST     Code Status:Full  DVT Prophylaxis:Eliquis  T/L/D: PIV,  Vasquez  SUP: PPI  Diet: ADAT  Activity Level:ad jose f  ABCDEF Bundle/Checklist Completed:Yes  Disposition: Stay in ICU  Multidisciplinary Rounds Completed:  yes  Patient/Family Updated: Yes    [facility]   2[facility]3 Transferred to CVICU for [facility] placement  2/7 started on dobutamine as adjunct to milrinone    SUBJECTIVE:     As noted above    Review of Systems:     Review of Systems   Constitutional:  Positive for malaise/fatigue. Negative for chills and fever. HENT:  Negative for congestion and sinus pain. Eyes:  Negative for blurred vision, double vision and pain. Respiratory:  Positive for shortness of breath (exertional, improved). Negative for hemoptysis and sputum production. Cardiovascular:  Negative for chest pain, palpitations and leg swelling. Gastrointestinal:  Negative for abdominal pain, nausea and vomiting. Genitourinary:  Negative for dysuria, frequency and urgency. Musculoskeletal:  Negative for back pain, joint pain and myalgias. Skin:  Negative for itching and rash. Neurological:  Negative for tremors, sensory change, speech change and focal weakness. Psychiatric/Behavioral:  Negative for hallucinations. The patient is not nervous/anxious.        OBJECTIVE:     Labs and Data: Reviewed 02/14/23  Medications: Reviewed 02/14/23  Imaging: Reviewed 02/14/23    General - chronically ill, sarcopenia, OOBTC  HEENT- pupils equal, EOMI, anicteric  Neuro - moves all extrem, follows basic commands, no focal deficit  CV - RRR, systolic murmur  Resp - rales b/l, NC  Abd - soft, nontender, no guarding  MSK- intact, no sacral ulcer      Visit Vitals  /79   Pulse 84   Temp 97.7 °F (36.5 °C)   Resp 26   Ht 5' 6\" (1.676 m)   Wt 56.5 kg (124 lb 9 oz)   SpO2 96%   BMI 20.10 kg/m²    O2 Flow Rate (L/min): 2 l/min O2 Device: None (Room air) Temp (24hrs), Av °F (36.7 °C), Min:97.7 °F (36.5 °C), Max:98.2 °F (36.8 °C)    CVP (mmHg): 7 mmHg (23 0800)      Intake/Output:     Intake/Output Summary (Last 24 hours) at 2023 1123  Last data filed at 2023 0800  Gross per 24 hour   Intake 223.5 ml   Output 1550 ml   Net -1326.5 ml         Imaging    23    ECHO ADULT FOLLOW-UP OR LIMITED 2023    Interpretation Summary    Left Ventricle: EF by visual approximation is 20%. Left ventricle is mildly dilated. Mitral Valve: Moderately thickened leaflet, at the anterior and posterior leaflets. Moderately calcified leaflet. Moderate regurgitation. Left Atrium: Left atrium is moderately dilated. Technical qualifiers: Echo study was technically difficult with poor endocardial visualization. Contrast used: Definity. Limited study, not all structures viewed    Signed by: Estefani Page MD on 2023  4:39 PM       Pertinent imaging reviewed and interpreted as noted above    CRITICAL CARE DOCUMENTATION  I had a face to face encounter with the patient, reviewed and interpreted patient data including clinical events, labs, images, vital signs, I/O's, and examined patient. I have discussed the case and the plan and management of the patient's care with the consulting services, the bedside nurses and the respiratory therapist.      NOTE OF PERSONAL INVOLVEMENT IN CARE   This patient has a high probability of imminent, clinically significant deterioration, which requires the highest level of preparedness to intervene urgently. I participated in the decision-making and personally managed or directed the management of the following life and organ supporting interventions that required my frequent assessment to treat or prevent imminent deterioration. I personally spent 35 minutes of critical care time.   This is time spent at this critically ill patient's bedside actively involved in patient care as well as the coordination of care.  This does not include any procedural time which has been billed separately. Walker Carlos MD  Staff [facility]

## 2023-02-14 NOTE — TELEPHONE ENCOUNTER
ON THE PHONE NOW WITH  INSURANCE REP FOR PT LVAD STILL AUTH STILL PENDING. AUTHORIZATION STILL PENDING.

## 2023-02-15 ENCOUNTER — HOSPITAL ENCOUNTER (OUTPATIENT)
Dept: NON INVASIVE DIAGNOSTICS | Age: 73
Discharge: HOME | End: 2023-02-15
Attending: THORACIC SURGERY (CARDIOTHORACIC VASCULAR SURGERY)

## 2023-02-15 ENCOUNTER — ANESTHESIA (OUTPATIENT)
Dept: CARDIOTHORACIC SURGERY | Age: 73
End: 2023-02-15
Payer: MEDICARE

## 2023-02-15 ENCOUNTER — OFFICE VISIT (OUTPATIENT)
Dept: CARDIOLOGY CLINIC | Age: 73
End: 2023-02-15

## 2023-02-15 ENCOUNTER — PATIENT OUTREACH (OUTPATIENT)
Dept: CASE MANAGEMENT | Age: 73
End: 2023-02-15

## 2023-02-15 DIAGNOSIS — I50.23 SYSTOLIC CHF, ACUTE ON CHRONIC (HCC): Primary | ICD-10-CM

## 2023-02-15 LAB
ADMINISTERED INITIALS, ADMINIT: NORMAL
D50 ADMINISTERED, D50ADM: 0 ML
D50 ORDER, D50ORD: 0 ML
GLUCOSE, GLC: 103 MG/DL
GLUCOSE, GLC: 108 MG/DL
GLUCOSE, GLC: 110 MG/DL
GLUCOSE, GLC: 114 MG/DL
GLUCOSE, GLC: 119 MG/DL
GLUCOSE, GLC: 125 MG/DL
GLUCOSE, GLC: 139 MG/DL
GLUCOSE, GLC: 144 MG/DL
GLUCOSE, GLC: 199 MG/DL
GLUCOSE, GLC: 228 MG/DL
GLUCOSE, GLC: 83 MG/DL
GLUCOSE, GLC: 92 MG/DL
GLUCOSE, GLC: 92 MG/DL
HIGH TARGET, HITG: 130 MG/DL
HIGH TARGET, HITG: 140 MG/DL
INSULIN ADMINSTERED, INSADM: 0 UNITS/HOUR
INSULIN ADMINSTERED, INSADM: 0.3 UNITS/HOUR
INSULIN ADMINSTERED, INSADM: 0.5 UNITS/HOUR
INSULIN ADMINSTERED, INSADM: 0.8 UNITS/HOUR
INSULIN ADMINSTERED, INSADM: 1.4 UNITS/HOUR
INSULIN ADMINSTERED, INSADM: 1.4 UNITS/HOUR
INSULIN ADMINSTERED, INSADM: 1.8 UNITS/HOUR
INSULIN ADMINSTERED, INSADM: 2.5 UNITS/HOUR
INSULIN ADMINSTERED, INSADM: 3.4 UNITS/HOUR
INSULIN ORDER, INSORD: 0 UNITS/HOUR
INSULIN ORDER, INSORD: 0.3 UNITS/HOUR
INSULIN ORDER, INSORD: 0.5 UNITS/HOUR
INSULIN ORDER, INSORD: 0.8 UNITS/HOUR
INSULIN ORDER, INSORD: 1.4 UNITS/HOUR
INSULIN ORDER, INSORD: 1.4 UNITS/HOUR
INSULIN ORDER, INSORD: 1.8 UNITS/HOUR
INSULIN ORDER, INSORD: 2.5 UNITS/HOUR
INSULIN ORDER, INSORD: 3.4 UNITS/HOUR
LOW TARGET, LOT: 100 MG/DL
LOW TARGET, LOT: 95 MG/DL
MINUTES UNTIL NEXT BG, NBG: 60 MIN
MULTIPLIER, MUL: 0
MULTIPLIER, MUL: 0.01
MULTIPLIER, MUL: 0.02
MULTIPLIER, MUL: 0.02
MULTIPLIER, MUL: 0.03
ORDER INITIALS, ORDINIT: NORMAL

## 2023-02-15 PROCEDURE — 77030008684 HC TU ET CUF COVD -B: Performed by: STUDENT IN AN ORGANIZED HEALTH CARE EDUCATION/TRAINING PROGRAM

## 2023-02-15 PROCEDURE — 74011250636 HC RX REV CODE- 250/636: Performed by: STUDENT IN AN ORGANIZED HEALTH CARE EDUCATION/TRAINING PROGRAM

## 2023-02-15 PROCEDURE — 74011000636 HC RX REV CODE- 636: Performed by: THORACIC SURGERY (CARDIOTHORACIC VASCULAR SURGERY)

## 2023-02-15 PROCEDURE — 74011000250 HC RX REV CODE- 250: Performed by: NURSE PRACTITIONER

## 2023-02-15 PROCEDURE — 74011000250 HC RX REV CODE- 250: Performed by: NURSE ANESTHETIST, CERTIFIED REGISTERED

## 2023-02-15 PROCEDURE — 74011250636 HC RX REV CODE- 250/636: Performed by: NURSE ANESTHETIST, CERTIFIED REGISTERED

## 2023-02-15 PROCEDURE — 74011000258 HC RX REV CODE- 258: Performed by: THORACIC SURGERY (CARDIOTHORACIC VASCULAR SURGERY)

## 2023-02-15 PROCEDURE — 74011000250 HC RX REV CODE- 250: Performed by: THORACIC SURGERY (CARDIOTHORACIC VASCULAR SURGERY)

## 2023-02-15 PROCEDURE — 74011000258 HC RX REV CODE- 258: Performed by: NURSE ANESTHETIST, CERTIFIED REGISTERED

## 2023-02-15 PROCEDURE — 77030020061 HC IV BLD WRMR ADMIN SET 3M -B: Performed by: STUDENT IN AN ORGANIZED HEALTH CARE EDUCATION/TRAINING PROGRAM

## 2023-02-15 PROCEDURE — 74011250636 HC RX REV CODE- 250/636: Performed by: THORACIC SURGERY (CARDIOTHORACIC VASCULAR SURGERY)

## 2023-02-15 PROCEDURE — P9045 ALBUMIN (HUMAN), 5%, 250 ML: HCPCS | Performed by: THORACIC SURGERY (CARDIOTHORACIC VASCULAR SURGERY)

## 2023-02-15 PROCEDURE — 77030026438 HC STYL ET INTUB CARD -A: Performed by: STUDENT IN AN ORGANIZED HEALTH CARE EDUCATION/TRAINING PROGRAM

## 2023-02-15 PROCEDURE — 77030008771 HC TU NG SALEM SUMP -A: Performed by: STUDENT IN AN ORGANIZED HEALTH CARE EDUCATION/TRAINING PROGRAM

## 2023-02-15 RX ORDER — SODIUM CHLORIDE, SODIUM LACTATE, POTASSIUM CHLORIDE, CALCIUM CHLORIDE 600; 310; 30; 20 MG/100ML; MG/100ML; MG/100ML; MG/100ML
INJECTION, SOLUTION INTRAVENOUS
Status: DISCONTINUED | OUTPATIENT
Start: 2023-02-15 | End: 2023-02-15 | Stop reason: HOSPADM

## 2023-02-15 RX ORDER — HYDROMORPHONE HYDROCHLORIDE 2 MG/ML
INJECTION, SOLUTION INTRAMUSCULAR; INTRAVENOUS; SUBCUTANEOUS AS NEEDED
Status: DISCONTINUED | OUTPATIENT
Start: 2023-02-15 | End: 2023-02-15 | Stop reason: HOSPADM

## 2023-02-15 RX ORDER — SODIUM CHLORIDE 9 MG/ML
INJECTION, SOLUTION INTRAVENOUS
Status: DISCONTINUED | OUTPATIENT
Start: 2023-02-15 | End: 2023-02-15 | Stop reason: HOSPADM

## 2023-02-15 RX ORDER — CALCIUM CHLORIDE INJECTION 100 MG/ML
INJECTION, SOLUTION INTRAVENOUS AS NEEDED
Status: DISCONTINUED | OUTPATIENT
Start: 2023-02-15 | End: 2023-02-15 | Stop reason: HOSPADM

## 2023-02-15 RX ORDER — HEPARIN SODIUM 1000 [USP'U]/ML
INJECTION, SOLUTION INTRAVENOUS; SUBCUTANEOUS AS NEEDED
Status: DISCONTINUED | OUTPATIENT
Start: 2023-02-15 | End: 2023-02-15 | Stop reason: HOSPADM

## 2023-02-15 RX ORDER — ETOMIDATE 2 MG/ML
INJECTION INTRAVENOUS AS NEEDED
Status: DISCONTINUED | OUTPATIENT
Start: 2023-02-15 | End: 2023-02-15 | Stop reason: HOSPADM

## 2023-02-15 RX ORDER — VECURONIUM BROMIDE FOR INJECTION 1 MG/ML
INJECTION, POWDER, LYOPHILIZED, FOR SOLUTION INTRAVENOUS AS NEEDED
Status: DISCONTINUED | OUTPATIENT
Start: 2023-02-15 | End: 2023-02-15 | Stop reason: HOSPADM

## 2023-02-15 RX ORDER — SODIUM BICARBONATE 1 MEQ/ML
SYRINGE (ML) INTRAVENOUS AS NEEDED
Status: DISCONTINUED | OUTPATIENT
Start: 2023-02-15 | End: 2023-02-15 | Stop reason: HOSPADM

## 2023-02-15 RX ORDER — DESMOPRESSIN ACETATE 4 UG/ML
INJECTION, SOLUTION INTRAVENOUS; SUBCUTANEOUS AS NEEDED
Status: DISCONTINUED | OUTPATIENT
Start: 2023-02-15 | End: 2023-02-15 | Stop reason: HOSPADM

## 2023-02-15 RX ORDER — CEFAZOLIN SODIUM 1 G/3ML
INJECTION, POWDER, FOR SOLUTION INTRAMUSCULAR; INTRAVENOUS AS NEEDED
Status: DISCONTINUED | OUTPATIENT
Start: 2023-02-15 | End: 2023-02-15 | Stop reason: HOSPADM

## 2023-02-15 RX ADMIN — PROTHROMBIN, COAGULATION FACTOR VII HUMAN, COAGULATION FACTOR IX HUMAN, COAGULATION FACTOR X HUMAN, PROTEIN C, PROTEIN S HUMAN, AND WATER 1039 UNITS: KIT at 13:41

## 2023-02-15 RX ADMIN — Medication 2.5 UNITS/HR: at 08:39

## 2023-02-15 RX ADMIN — FENTANYL CITRATE 100 MCG: 50 INJECTION, SOLUTION INTRAMUSCULAR; INTRAVENOUS at 07:54

## 2023-02-15 RX ADMIN — SODIUM CHLORIDE 25 MCG/MIN: 9 INJECTION, SOLUTION INTRAVENOUS at 08:05

## 2023-02-15 RX ADMIN — ETOMIDATE 6 MCG: 2 INJECTION INTRAVENOUS at 07:54

## 2023-02-15 RX ADMIN — SODIUM BICARBONATE 50 MEQ: 84 INJECTION, SOLUTION INTRAVENOUS at 13:48

## 2023-02-15 RX ADMIN — ALBUMIN (HUMAN) 250 ML: 12.5 INJECTION, SOLUTION INTRAVENOUS at 12:36

## 2023-02-15 RX ADMIN — ALBUMIN (HUMAN) 250 ML: 12.5 INJECTION, SOLUTION INTRAVENOUS at 13:06

## 2023-02-15 RX ADMIN — SODIUM CHLORIDE, POTASSIUM CHLORIDE, SODIUM LACTATE AND CALCIUM CHLORIDE: 600; 310; 30; 20 INJECTION, SOLUTION INTRAVENOUS at 07:30

## 2023-02-15 RX ADMIN — DEXMEDETOMIDINE HYDROCHLORIDE 0.4 MCG/KG/HR: 4 INJECTION, SOLUTION INTRAVENOUS at 12:52

## 2023-02-15 RX ADMIN — HYDROMORPHONE HYDROCHLORIDE 0.5 MG: 2 INJECTION, SOLUTION INTRAMUSCULAR; INTRAVENOUS; SUBCUTANEOUS at 14:08

## 2023-02-15 RX ADMIN — SODIUM CHLORIDE: 9 INJECTION, SOLUTION INTRAVENOUS at 07:30

## 2023-02-15 RX ADMIN — FENTANYL CITRATE 50 MCG: 50 INJECTION, SOLUTION INTRAMUSCULAR; INTRAVENOUS at 08:50

## 2023-02-15 RX ADMIN — CEFAZOLIN 2 G: 330 INJECTION, POWDER, FOR SOLUTION INTRAMUSCULAR; INTRAVENOUS at 08:15

## 2023-02-15 RX ADMIN — PROTAMINE SULFATE 250 MG: 10 INJECTION, SOLUTION INTRAVENOUS at 12:23

## 2023-02-15 RX ADMIN — MAGNESIUM SULFATE 2 G: 2 INJECTION INTRAVENOUS at 11:42

## 2023-02-15 RX ADMIN — PROTAMINE SULFATE 25 MG: 10 INJECTION, SOLUTION INTRAVENOUS at 12:59

## 2023-02-15 RX ADMIN — AMINOCAPROIC ACID 10 G/HR: 250 INJECTION, SOLUTION INTRAVENOUS at 07:54

## 2023-02-15 RX ADMIN — HYDROMORPHONE HYDROCHLORIDE 0.5 MG: 2 INJECTION, SOLUTION INTRAMUSCULAR; INTRAVENOUS; SUBCUTANEOUS at 13:41

## 2023-02-15 RX ADMIN — DESMOPRESSIN ACETATE 17 MCG: 4 INJECTION, SOLUTION INTRAVENOUS; SUBCUTANEOUS at 12:29

## 2023-02-15 RX ADMIN — CEFAZOLIN 2 G: 330 INJECTION, POWDER, FOR SOLUTION INTRAMUSCULAR; INTRAVENOUS at 11:15

## 2023-02-15 RX ADMIN — VECURONIUM BROMIDE 4 MG: 10 INJECTION, POWDER, LYOPHILIZED, FOR SOLUTION INTRAVENOUS at 09:51

## 2023-02-15 RX ADMIN — BUMETANIDE 1 MG: 0.25 INJECTION, SOLUTION INTRAMUSCULAR; INTRAVENOUS at 08:12

## 2023-02-15 RX ADMIN — HEPARIN SODIUM 25000 UNITS: 1000 INJECTION, SOLUTION INTRAVENOUS; SUBCUTANEOUS at 10:38

## 2023-02-15 RX ADMIN — VECURONIUM BROMIDE 10 MG: 10 INJECTION, POWDER, LYOPHILIZED, FOR SOLUTION INTRAVENOUS at 07:54

## 2023-02-15 RX ADMIN — CALCIUM CHLORIDE 1 G: 100 INJECTION, SOLUTION INTRAVENOUS; INTRAVENTRICULAR at 12:46

## 2023-02-15 RX ADMIN — NOREPINEPHRINE BITARTRATE 4 MCG/MIN: 1 INJECTION, SOLUTION, CONCENTRATE INTRAVENOUS at 12:07

## 2023-02-15 RX ADMIN — VANCOMYCIN HYDROCHLORIDE 1 MG: 1 INJECTION, POWDER, LYOPHILIZED, FOR SOLUTION INTRAVENOUS at 08:15

## 2023-02-15 RX ADMIN — FENTANYL CITRATE 100 MCG: 50 INJECTION, SOLUTION INTRAMUSCULAR; INTRAVENOUS at 08:27

## 2023-02-15 RX ADMIN — PROTHROMBIN, COAGULATION FACTOR VII HUMAN, COAGULATION FACTOR IX HUMAN, COAGULATION FACTOR X HUMAN, PROTEIN C, PROTEIN S HUMAN, AND WATER 1039 UNITS: KIT at 13:06

## 2023-02-15 RX ADMIN — SODIUM CHLORIDE: 900 INJECTION, SOLUTION INTRAVENOUS at 07:30

## 2023-02-15 RX ADMIN — EPINEPHRINE 4 MCG/MIN: 1 INJECTION INTRAMUSCULAR; INTRAVENOUS; SUBCUTANEOUS at 12:07

## 2023-02-15 NOTE — ANESTHESIA POSTPROCEDURE EVALUATION
Post-Anesthesia Evaluation and Assessment    Patient: Melva Nettles MRN: [id number]  SSN: [id number]    YOB: 1950  Age: 71 y.o. Sex: male      I have evaluated the patient and they are stable in the ICU. Cardiovascular Function/Vital Signs  HR 86 v paced  BP 97/54  PAP 34/16  CVP 16  SpO2 100    LVAD flow 2.5, speed 4800, PI 3.7    Patient is status post General anesthesia for Procedure(s):  REDO STERNOTOMY; RIGHT GROIN CUTDOWN WITH FEMORAL CANNULATION; LEFT VENTRICULAR ASSIST DEVICE HEARTMATE 3; ECC; KIARRA BY DR. Marv Arroyo. Nausea/Vomiting: None    Postoperative hydration reviewed and adequate. Pain:  Managed    Neurological Status:   Intubated and sedated     Pulmonary Status:   Intubated with adequate ventilation and oxygenation     Complications related to anesthesia: None    Post-anesthesia assessment completed. No concerns    Signed By: Irene Ace DO     February 15, 2023                Procedure(s):  REDO STERNOTOMY; RIGHT GROIN CUTDOWN WITH FEMORAL CANNULATION; LEFT VENTRICULAR ASSIST DEVICE HEARTMATE 3; ECC; KIARRA BY DR. Mrav Arroyo.     general    <[email or web address]>

## 2023-02-15 NOTE — PROGRESS NOTES
Pharmacist Note - Vancomycin Dosing    Consult provided for this 71 y.o. male for indication of surgical ppx. Antibiotic regimen(s): vanc + fluconazole + rifampin + levofloxacin  Patient on vancomycin PTA? NO     Recent Labs     02/15/23  1444 02/15/23  0310 02/15/23  0251 23  0357 23  0340 23  0407   WBC 20.4*  --  9.6 7.1  --  8.1   CREA  --  1.67*  --   --  1.49* 1.53*   BUN  --  31*  --   --  28* 23*     Frequency of BMP: daily  Height: 167.6 cm  Weight: 56.7 kg  Est CrCl: 30-35 ml/min; UO: -- ml/kg/hr  Temp (24hrs), Av °F (36.7 °C), Min:97.7 °F (36.5 °C), Max:98.8 °F (37.1 °C)      MRSA Swab ordered (if applicable)? N/A    The plan below is expected to result in a target range of AUC/-600    Patient received 1000mg IV x1 pre-op this morning. Will maintenance dose of 750 mg IV every 24 hours. Pharmacy to follow patient daily and order levels / make dose adjustments as appropriate. *Vancomycin has been dosed used Bayesian kinetics software to target an AUC/SHA of 400-600, which provides adequate exposure for an assumed infection due to MRSA with an SHA of 1 or less while reducing the risk of nephrotoxicity as seen with traditional trough based dosing goals.

## 2023-02-15 NOTE — PROGRESS NOTES
1430- TRANSFER - IN REPORT:    Verbal report received from Anesthesia and Teo ABBOTT on Destiny Inoue  being received from OR(unit) for routine post - op      Report consisted of patients Situation, Background, Assessment and   Recommendations(SBAR). Information from the following report(s) SBAR, Kardex, OR Summary, Intake/Output, MAR, Recent Results, Med Rec Status, and Cardiac Rhythm Ventricular Paced  was reviewed with the receiving nurse. Opportunity for questions and clarification was provided. Assessment completed upon patients arrival to unit and care assumed. 1500- abg reviewed with [place] 2 amps of bicarb ordered    1625- donnelly exchanged per Nessa Cary NP    1700- recheck abg reviewed with Dr. Jaleesa Soliman. Tidal volume decreased to 380 fio2 to 60%. Pulses not audible with doppler, poor cap refill, cool fingers and toes, mottled. Dr. Jaleesa Soliman aware. 1945- lactic increased from 2.2 to 3.3. Dr. Regis Guan aware. 2000- Bedside and Verbal shift change report given to [address] (oncoming nurse) by Sharan Louis (offgoing nurse). Report included the following information SBAR, OR Summary, Intake/Output, MAR, Recent Results, Med Rec Status, and Cardiac Rhythm Vpaced . Medication(s) Requested:   zolpidem (AMBIEN) 10 MG tablet 30 tablet 3 9/27/2022     Sig: TAKE 1 TABLET BY MOUTH NIGHTLY AS NEEDED FOR SLEEP    Sent to pharmacy as: Zolpidem Tartrate 10 MG Oral Tablet (AMBIEN)    Class: Eprescribe    E-Prescribing Status: Receipt confirmed by pharmacy (9/27/2022  8:56 AM CDT)        Last office visit: 11/23/2022 with advised 3 month follow up  Next Appointment: 2/22/2023  Last refill: 12/24/2022 per PDMP  Is the patient due for refill of this medication(s): Yes  PDMP review: Criteria met. Forwarded to Physician/KATJA for signature.

## 2023-02-15 NOTE — PROGRESS NOTES
Cardiac Surgery Coordinator- Met with the family of Eloisa August, introduced role of the Cardiac Surgery Care Coordinator. Reviewed plan of care and day of surgery expectations. Provided family with an update from OR. Encouraged family to verbalize and emotional support given. Will continue to update throughout the day. 56- Met with the family and provided clinical update from the OR.     1130- Met with family of Eloisa August, provided family with update. Family without questions or concerns at this time. Will continue to follow for educational and emotional needs. 1330- Met with Yunior Kendall's family and Dr. Denia Parrish. Update given, Encouraged family to verbalize and offered emotional support. Reinforced Surgical waiting room instructions, family to wait in the main surgical waiting room until contacted by the nursing staff. [id number]- Escorted family to the bedside in CVICU, reviewed post op plan of care and encouraged them to verbalize. Exchanged contact information and offered emotional support. They will be going home for the day. Will continue to follow for educational and emotional support.  Michelle Toure RN

## 2023-02-15 NOTE — PROGRESS NOTES
Occupational Therapy:     Chart reviewed in prep for OT tx session. Pt currently EVAN in OR for placement of LVAD. Will hold and follow POD1 as able and medically appropriate.      Thank you,   Erlinda Moreno, OTD, OTR/L

## 2023-02-15 NOTE — PROGRESS NOTES
CRITICAL CARE NOTE      Name: Ana Torres   : 1950   MRN: [id number]   Date: 2/15/2023      Reason for ICU Admission: Acute on chronic HFrEF, LVAD workup     ICU PROBLEM LIST   Acute on chronic HFrEF (20%) NYHA IIIb/IV (D) on home inotropic support, life vest  TANNER on CKD3  Aflutter w/ RVR  Acute on chronic hypoxemic respiratory failure  CAP  CAD  Gastric neuroendocrine tumor  Hx of LUE DVT  DM  PAD  TRISTAN  BPH w/ urinary retention requiring chronic donnelly    HISTORY OF PRESENT ILLNESS:   Pt is a 71 y.o M w/ PMH as noted above admitted to [facility] on  due to ongoing symptoms secondary to his HFrEF. Treated for possible CAP, and diuresed for acute on chronic HFrEF. Nephrology following for TANNER on CKD 3. AHF team recommended transfer to CVICU for [facility] placement and ongoing LVAD workup with placement 2/15.     24 HOUR EVENTS:   No overnight events, s/p redo sternotomy and LVAD implant today w/ R  fem cutdown for access. Arrived to CVICU intubated on Angella, sedated, on epinephrine and dobutamine. V paced to 86    Post op KIARRA w/ small LV cavity  EF15%, AI 2-3+,  initially preserved RV function however moderate dysfunction upon closing.        CPB time  EBL: 1500ml ,  cell saver 650ml  IVFs: 500ml Albumin, 1L  crystalloid,  reported 5L given through CPB    1 bipolar ventricular wire, 5 valentin drains(M-2, R pleu, L pleu, LVAD pocket),PAC    LVAD  2.5L @ 4800 RPM    NEUROLOGICAL:    Propofol, Precedex, fentanyl gtt  Keep  sedated/intubated o/n per CTS  RASHAD in AM  Delirium precautions  Encourage work w/ PT/OT once  extubated    PULMONOLOGY:   Lung protective ventilation  C/w Angella  Keep intubated o/n per CTS  SBT in AM  Monitor blood gas    CARDIOVASCULAR:   On epi, dobutamine,Angella  for RV  support  S/p LVAD implant, 2.5L @ 4800 RPM  Monitor chest tube output  V  paced to 86  Goal euvolemia  close hemodynamic monitoring, trend lct,  monitor for evidence  of hypoperfusion   Unable to tolerate BB, ARNI/ARB due to , aldactone held 2/2 elevated K   C/w ASA, amiodarone, statin    GASTROINTESTINAL:   NPO, ADAT once extubated  PPI     RENAL/ELECTROLYTE/FLUIDS:   Strict I/Os,  goal  euvolemia  Nephrology following  Monitor RFP  Mg/Phos/K >2/3/4  Vasquez to remain in place    ENDOCRINE:   Insulin gtt per protocol  Resume SSI, Basal insulin when indicated  Hypoglycemic protocol  Glucose goal 120-180    HEMATOLOGY/ONCOLOGY:   Heparin gtt, VAD protocol  EPO weekly  Gastric neuroendocrine tumor, well differentiated, good prognosis per onc. ID/MICRO:   Tana  VAD implant Vanc,  rif, levaquin, fluconazole    ICU DAILY CHECKLIST     Code Status:Full  DVT Prophylaxis:heparin  T/L/D: PAC, PIV,  Vasquez,  V  wire,  valentin  x5, LVAD drive line  SUP: PPI  Diet: NPO  Activity Level:ad jose f  ABCDEF Bundle/Checklist Completed:Yes  Disposition: Stay in ICU  Multidisciplinary Rounds Completed:  yes  Patient/Family Updated: Yes    [place]   2/3 Transferred to CVICU for [facility] placement  2/7 started on dobutamine as adjunct to milrinone    SUBJECTIVE:     As noted above    Review of Systems:     Review of Systems   Unable to perform ROS: Intubated   Constitutional:  Negative for chills, fever and malaise/fatigue. HENT:  Negative for congestion and sinus pain. Eyes:  Negative for blurred vision, double vision and pain. Respiratory:  Negative for hemoptysis, sputum production and shortness of breath. Cardiovascular:  Negative for chest pain, palpitations and leg swelling. Gastrointestinal:  Negative for abdominal pain, nausea and vomiting. Genitourinary:  Negative for dysuria, frequency and urgency. Musculoskeletal:  Negative for back pain, joint pain and myalgias. Skin:  Negative for itching and rash. Neurological:  Negative for tremors, sensory change, speech change and focal weakness. Psychiatric/Behavioral:  Negative for hallucinations. The patient is not nervous/anxious.        OBJECTIVE:     Labs and Data: Reviewed 02/15/23  Medications: Reviewed 02/15/23  Imaging: Reviewed 02/15/23    General - sedated, intubated chronically ill appearing  HEENT- pupils equal, nystagmus, anicteric  Neuro - sedated, no focal deficit  CV - Paced,  VAD hum, post sternotomy, valentin  x5  Resp - rales b/l, NC  Abd - soft, distended, nontender, no guarding  MSK- intact, no sacral ulcer      Visit Vitals  BP (!) 88/57 (BP 1 Location: Right lower arm)   Pulse 91   Temp 97.9 °F (36.6 °C)   Resp (!) 31   Ht 5' 6\" (1.676 m)   Wt 56.7 kg (125 lb)   SpO2 94%   BMI 20.18 kg/m²    O2 Flow Rate (L/min): 4 l/min O2 Device: Nasal cannula Temp (24hrs), Av.8 °F (36.6 °C), Min:97.7 °F (36.5 °C), Max:98.1 °F (36.7 °C)    CVP (mmHg): 7 mmHg (23 0800)      Intake/Output:     Intake/Output Summary (Last 24 hours) at 2/15/2023 1422  Last data filed at 2/15/2023 1400  Gross per 24 hour   Intake 1776.2 ml   Output 1500 ml   Net 276.2 ml         Imaging    23    ECHO ADULT FOLLOW-UP OR LIMITED 2023    Interpretation Summary    Left Ventricle: EF by visual approximation is 20%. Left ventricle is mildly dilated. Mitral Valve: Moderately thickened leaflet, at the anterior and posterior leaflets. Moderately calcified leaflet. Moderate regurgitation. Left Atrium: Left atrium is moderately dilated. Technical qualifiers: Echo study was technically difficult with poor endocardial visualization. Contrast used: Definity. Limited study, not all structures viewed    Signed by: Kishore Rosas MD on 2023  4:39 PM       Pertinent imaging reviewed and interpreted as noted above    CRITICAL CARE DOCUMENTATION  I had a face to face encounter with the patient, reviewed and interpreted patient data including clinical events, labs, images, vital signs, I/O's, and examined patient.   I have discussed the case and the plan and management of the patient's care with the consulting services, the bedside nurses and the respiratory therapist.      NOTE OF PERSONAL INVOLVEMENT IN CARE   This patient has a high probability of imminent, clinically significant deterioration, which requires the highest level of preparedness to intervene urgently. I participated in the decision-making and personally managed or directed the management of the following life and organ supporting interventions that required my frequent assessment to treat or prevent imminent deterioration. I personally spent 60 minutes of critical care time. This is time spent at this critically ill patient's bedside actively involved in patient care as well as the coordination of care. This does not include any procedural time which has been billed separately. Walker Lind MD  Staff [facility]

## 2023-02-15 NOTE — BRIEF OP NOTE
BRIEF POSTOPERATIVE NOTE    Patient: Romain Wright  YOB: 1950  MRN: [id number]    Pre-Op Diagnosis: CONGESTIVE HEART FAILURE    Post-Op Diagnosis: CONGESTIVE HEART FAILURE      Procedure:   REDO STERNOTOMY; RIGHT GROIN CUTDOWN WITH FEMORAL CANNULATION; LEFT VENTRICULAR ASSIST DEVICE HEARTMATE 3; ECC; KIARRA BY DR. Stacy Stovall    Surgeon: Ian Jain MD    Assistant(s): SCAR Clifford    Anesthesia: General     Infusions/Support: Amiodarone, precedex, insulin, epi, levo, Angella, dobut    Estimated Blood Loss (mL): 1500    Cell Saver (mL): 650    Specimens:   ID Type Source Tests Collected by Time Destination   1 : LEFT VENTRICLE APICAL CORE [facility]  Mehul Rosales MD 2/15/2023 8597 Pathology       Drains and pacing wires: 2 atrial wires, 1 bipolar ventricular wire, 3 valentin drains    Complications: none    Findings: CHF    Implants:   Implant Name Type Inv.  Item Serial No.  Lot No. LRB No. Used Action   DEVICE IMPL VENTRCLR ASST KIT -- HEARTMATE III - [id number]  DEVICE IMPL VENTRCLR ASST KIT -- HEARTMATE III [id number] [employer] NA Left 1 Implanted   GRAFT VASC L20CM [id number] STRTCH [place] - [id number]  GRAFT VASC L20CM [id number] [employer]CH [place] [id number]  [employer]WD NA Left 1 Implanted       Electronically Signed by SCAR Oh on 02/15/23 at 2:06 PM

## 2023-02-15 NOTE — PROGRESS NOTES
2000: Bedside and Verbal shift change report given to Mina Bill RN (oncoming nurse) by Lenore Mata RN (offgoing nurse). Report included the following information SBAR, Kardex, ED Summary, Intake/Output, MAR, Recent Results, and Cardiac Rhythm NSR - sinus tach    0330: Morning labs sent    0400: chg bath completed. 0430: Family at bedside. 36: TRANSFER - OUT REPORT:    Verbal report given to LORI Ochoa(name) on Lauren Reina  being transferred to pre op holding(unit) for ordered procedure       Report consisted of patients Situation, Background, Assessment and   Recommendations(SBAR). Information from the following report(s) SBAR, Kardex, and Recent Results was reviewed with the receiving nurse. Lines:   Peripheral IV 02/13/23 Left;Posterior Hand (Active)   Site Assessment Clean, dry, & intact 02/14/23 2000   Phlebitis Assessment 0 02/14/23 2000   Infiltration Assessment 0 02/14/23 2000   Dressing Status Clean, dry, & intact 02/14/23 0800   Dressing Type Transparent;Tape 02/14/23 2000   Hub Color/Line Status Blue;Flushed;Patent;Capped 02/14/23 2000   Action Taken Open ports on tubing capped 02/14/23 2000   Alcohol Cap Used Yes 02/14/23 2000       Peripheral IV 02/13/23 Anterior; Left Forearm (Active)   Site Assessment Clean, dry, & intact 02/14/23 2000   Phlebitis Assessment 0 02/14/23 2000   Infiltration Assessment 0 02/14/23 2000   Dressing Status Clean, dry, & intact 02/14/23 2000   Dressing Type Transparent;Tape 02/14/23 2000   Hub Color/Line Status Pink; Infusing;Patent 02/14/23 2000   Action Taken Open ports on tubing capped 02/14/23 2000   Alcohol Cap Used Yes 02/14/23 2000       Peripheral IV 76/21/67 Right Basilic (Active)   Site Assessment Clean, dry, & intact 02/14/23 2000   Phlebitis Assessment 0 02/14/23 2000   Infiltration Assessment 0 02/14/23 2000   Dressing Status Clean, dry, & intact 02/14/23 2000   Dressing Type Bacteriocidal;Transparent 02/14/23 2000   Hub Color/Line Status Pink;Flushed;Patent;Capped 02/14/23 2000   Action Taken Open ports on tubing capped 02/14/23 2000   Alcohol Cap Used Yes 02/14/23 2000        Opportunity for questions and clarification was provided.       Patient transported with:   Monitor  O2 @ 2 liters  Registered Nurse

## 2023-02-15 NOTE — ANESTHESIA PROCEDURE NOTES
KIARRA  Date/Time: 2/15/2023 8:20 AM      Procedure Details: probe placement, image aquisition & interpretation    Risks and benefits discussed with the patient and plans are to proceed    Procedure Note    Performed by: Keiko Krueger DO  Authorized by: Keiko Krueger DO     Indications: assessment of ascending aorta and assessment of surgical repair  Modalities: 2D, CF, CWD, PWD  Probe Type: biplane and multiplane  Insertion: atraumatic  Patient Status: intubated and sedated  Echocardiographic and Doppler Measurements   Aorta  Size  Diam(cm)  Dissection PlaqueThick(mm)  Plaque Mobile    Ascending normal  No  No    Arch normal  No 0-3 No    Descending normal  No 0-3 No          Valves  Annulus  Stenosis  Area/Grad  Regurg  Leaflet   Morph  Leaflet   Motion    Aortic normal mild 13/6 Max/Mean 1+ calcified restricted    Mitral normal none 13/3 Max/Mean 4+ thickened restricted    Tricuspid dilated none  3+ normal normal          Atria  Size  SEC (smoke)  Thrombus  Tumor  Device    Rt Atrium dilated No No  No    Lt Atrium normal No No  No     Interatrial Septum Morphology: normal    Interventricular Septum Morphology: normal  Ventricle  Cavity Size  Cavity Dimension Hypertrophy  Thrombus  Gloal FXN  EF    RV dilated  No no mildly impaired     LV normal   No normal 30-35%       Regional Function  (1 = normal, 2 = mildly hypokinetic, 3 = severely hypokinetic, 4 = akinetic, 5 = dyskinetic) LAV - Long [place]   ME LAV = 0  ME LAV = 90  ME LAV = 130   Basal Sept:3 Basal Ant:2 Basal Post:1   Mid Sept:3 Mid Ant:2 Mid Post:1   Apical Sept:3 Apical Ant:2 Basal Ant Sept:2   Basal Lat:2 Basal Inf:1 Mid Ant Sept:2   Mid Lat:2 Mid Inf:1    Apical Lat:2 Apical Inf:1        Pericardium: normal    Post Intervention Follow-up Study  Ventricular Global Function: decreased  Ventricular Regional Function: unchanged     Valve  Function  Regurgitation  Area    Aortic  2+     Mitral improved 0     Tricuspid no change 3+     Prosthetic Complications: None  Comments: Pre: LV systolic function is severely reduced with visually estimated EF of 30-35% on 2mcg/min of dobutamine, RV systolic function is mildly reduced with severely hypokinetic inferior free wall motion and otherwise low normal wall motion/TAPSE 1.03, MV: thickened leaflets with leaflet restriction without stenosis, mod-severe regurgitation, TV: moderate regurgitation, AV: The coronary cusp leaflet margins are calcified, the base of the right coronary cusp is most profoundly calcified and there is restriction of all cusps, mild 1-2+ aortic insufficiency with centrally directed jet secondary to poor coaptation due to cusp calcification (15-25% LVOT jet width, p t1/2 208), PV: 2+ PI, ZACHARIAH without thrombus, LV without thrombus, no PFO, Pleura: large left effusion, moderate right effusion. Wires and cannula confirmed in correct position prior to CPB. Post: RV at baseline immediately post CPB, reduced to severely dysfunctional requiring CPB for a short period of time to allow for recovery and titration of inotropes, LV contractility poor after CPB, AI now mild-moderate range, TR unchanged, MR improved to trivial. Small LV cavity, but inflow cannula with laminar flow and appropriate velocities, outflow cannula with appropriate flow and velocities, no PFO post, aorta intact. RV ranged from mild to moderately dysfunctional after chest closure and function would wax and wane from mild to moderate. All findings communicated with surgeon.

## 2023-02-15 NOTE — ANESTHESIA PREPROCEDURE EVALUATION
Anesthetic History   No history of anesthetic complications            Review of Systems / Medical History  Patient summary reviewed, nursing notes reviewed and pertinent labs reviewed    Pulmonary  Within defined limits                 Neuro/Psych              Cardiovascular    Hypertension          Past MI, CAD, cardiac stents, CABG and hyperlipidemia    Exercise tolerance: <4 METS  Comments: EF 25%   GI/Hepatic/Renal         Renal disease: CRI       Endo/Other    Diabetes: type 2         Other Findings              Physical Exam    Airway  Mallampati: II  TM Distance: 4 - 6 cm  Neck ROM: normal range of motion   Mouth opening: Normal     Cardiovascular  Regular rate and rhythm,  S1 and S2 normal,  no murmur, click, rub, or gallop             Dental    Dentition: Poor dentition and Lower partial plate     Pulmonary  Breath sounds clear to auscultation               Abdominal  GI exam deferred       Other Findings            Anesthetic Plan    ASA: 4  Anesthesia type: general    Monitoring Plan: Arterial line, Continuous noninvasive hemodynamic monitoring, BIS, CVP, Oxford-Marce and KIARRA      Induction: Intravenous  Anesthetic plan and risks discussed with: Patient

## 2023-02-15 NOTE — ANESTHESIA PROCEDURE NOTES
Arterial Line Placement    Performed by: Ramone Sanchez DO  Authorized by: Ramone Sanchez DO     Pre-Procedure

## 2023-02-15 NOTE — ANESTHESIA PROCEDURE NOTES
Central Line Placement    Start time: 2/15/2023 7:10 AM  End time: 2/15/2023 7:20 AM  Performed by: Olegario Juárez DO  Authorized by: Olegario Juárez DO     Indications: vascular access, central pressure monitoring and need for vasopressors  Preanesthetic Checklist: patient identified, risks and benefits discussed, anesthesia consent, site marked, patient being monitored and timeout performed      Pre-procedure: All elements of maximal sterile barrier technique followed?  Yes    2% Chlorhexidine for cutaneous antisepsis, Hand hygiene performed prior to catheter insertion and Ultrasound guidance    Sterile Ultrasound Technique followed?: Yes            Procedure:   Prep:  Chlorhexidine  Location: internal jugular  Orientation:  Right  Patient position:  Trendelenburg  Catheter type:  Double lumen  Catheter size:  9 Fr  Catheter length:  12 cm  Number of attempts:  1  Successful placement: Yes      Assessment:   Post-procedure:  Catheter secured and sterile dressing applied  Assessment:  Blood return through all ports, free fluid flow and guidewire removal verified  Insertion:  Uncomplicated  Patient tolerance:  Patient tolerated the procedure well with no immediate complications  8 Fr CCO PAC

## 2023-02-16 LAB
ADMINISTERED INITIALS, ADMINIT: NORMAL
D50 ADMINISTERED, D50ADM: 0 ML
D50 ADMINISTERED, D50ADM: 11 ML
D50 ADMINISTERED, D50ADM: 11 ML
D50 ORDER, D50ORD: 0 ML
D50 ORDER, D50ORD: 11 ML
D50 ORDER, D50ORD: 11 ML
GLUCOSE, GLC: 100 MG/DL
GLUCOSE, GLC: 102 MG/DL
GLUCOSE, GLC: 104 MG/DL
GLUCOSE, GLC: 117 MG/DL
GLUCOSE, GLC: 124 MG/DL
GLUCOSE, GLC: 126 MG/DL
GLUCOSE, GLC: 129 MG/DL
GLUCOSE, GLC: 129 MG/DL
GLUCOSE, GLC: 132 MG/DL
GLUCOSE, GLC: 133 MG/DL
GLUCOSE, GLC: 134 MG/DL
GLUCOSE, GLC: 139 MG/DL
GLUCOSE, GLC: 163 MG/DL
GLUCOSE, GLC: 194 MG/DL
GLUCOSE, GLC: 197 MG/DL
GLUCOSE, GLC: 72 MG/DL
GLUCOSE, GLC: 72 MG/DL
GLUCOSE, GLC: 81 MG/DL
GLUCOSE, GLC: 81 MG/DL
GLUCOSE, GLC: 84 MG/DL
GLUCOSE, GLC: 90 MG/DL
GLUCOSE, GLC: 97 MG/DL
HIGH TARGET, HITG: 130 MG/DL
INSULIN ADMINSTERED, INSADM: 0 UNITS/HOUR
INSULIN ADMINSTERED, INSADM: 0.2 UNITS/HOUR
INSULIN ADMINSTERED, INSADM: 0.4 UNITS/HOUR
INSULIN ADMINSTERED, INSADM: 0.6 UNITS/HOUR
INSULIN ADMINSTERED, INSADM: 0.7 UNITS/HOUR
INSULIN ADMINSTERED, INSADM: 0.7 UNITS/HOUR
INSULIN ADMINSTERED, INSADM: 1.1 UNITS/HOUR
INSULIN ADMINSTERED, INSADM: 1.4 UNITS/HOUR
INSULIN ADMINSTERED, INSADM: 1.5 UNITS/HOUR
INSULIN ADMINSTERED, INSADM: 4.1 UNITS/HOUR
INSULIN ADMINSTERED, INSADM: 4.4 UNITS/HOUR
INSULIN ADMINSTERED, INSADM: 5.2 UNITS/HOUR
INSULIN ADMINSTERED, INSADM: 5.4 UNITS/HOUR
INSULIN ORDER, INSORD: 0 UNITS/HOUR
INSULIN ORDER, INSORD: 0.2 UNITS/HOUR
INSULIN ORDER, INSORD: 0.4 UNITS/HOUR
INSULIN ORDER, INSORD: 0.6 UNITS/HOUR
INSULIN ORDER, INSORD: 0.7 UNITS/HOUR
INSULIN ORDER, INSORD: 0.7 UNITS/HOUR
INSULIN ORDER, INSORD: 1.1 UNITS/HOUR
INSULIN ORDER, INSORD: 1.4 UNITS/HOUR
INSULIN ORDER, INSORD: 1.5 UNITS/HOUR
INSULIN ORDER, INSORD: 4.1 UNITS/HOUR
INSULIN ORDER, INSORD: 4.4 UNITS/HOUR
INSULIN ORDER, INSORD: 5.2 UNITS/HOUR
INSULIN ORDER, INSORD: 5.4 UNITS/HOUR
LOW TARGET, LOT: 95 MG/DL
MINUTES UNTIL NEXT BG, NBG: 120 MIN
MINUTES UNTIL NEXT BG, NBG: 15 MIN
MINUTES UNTIL NEXT BG, NBG: 15 MIN
MINUTES UNTIL NEXT BG, NBG: 60 MIN
MULTIPLIER, MUL: 0
MULTIPLIER, MUL: 0.01
MULTIPLIER, MUL: 0.02
MULTIPLIER, MUL: 0.03
MULTIPLIER, MUL: 0.03
MULTIPLIER, MUL: 0.04
MULTIPLIER, MUL: 0.04
MULTIPLIER, MUL: 0.05
MULTIPLIER, MUL: 0.05
MULTIPLIER, MUL: 0.06
ORDER INITIALS, ORDINIT: NORMAL

## 2023-02-16 NOTE — PROGRESS NOTES
Saw patient. History and films reviewed. Risks and benefits explained including bleeding, death, RVAD, and stroke. Agrees with surgery. Findings/Conditions: Stage D heart failure   Plan of Care: Reop LVAD, will need groin cutdown       I had a face to face encounter with the patient, reviewed and interpreted patient data including clinical events, labs, images, vital signs, I/O's, and examined patient. I have discussed the case and the plan and management of the patient's care with the consulting services and bedside nurses. This patient has a high probability of imminent, clinically significant deterioration, which requires the highest level of preparedness to intervene urgently. I participated in the decision-making and personally managed or directed the management of life and organ supporting interventions to treat or prevent imminent deterioration. This patient has a critical illness or injury that is acutely impairing one or more vital organ systems such that there is a high probability of imminent or life threatening deterioration in the patient's condition. This patient requires high complexity medical decision making to assess, manipulate, and support vital organ system failure. After stabilization, this patient still requires management to prevent further life / organ threatening deterioration.

## 2023-02-16 NOTE — PROGRESS NOTES
Physical Therapy  2/16/2023    Chart reviewed. Patient POD 1 from LVAD. Will follow up tomorrow as able for evaluation. Thank you.     Janis Springer, PT, DPT

## 2023-02-16 NOTE — PROGRESS NOTES
Occupational Therapy  2/16/2023    New orders received and acknowledged. Patient remains intubated/sedated, will hold and continue to follow for OT re-evaluation. Thank you.      Naty Dumont, OTD, OTR/L

## 2023-02-16 NOTE — PROGRESS NOTES
1900: I have read and agree with Eric Vann RN documentation and care. I have reviewed the [facility] and all flowsheets associated with patient's care today.

## 2023-02-16 NOTE — PROGRESS NOTES
RENAL  PROGRESS NOTE        Subjective:   S/P LVAD  Febrile  Intubated, sedated    Objective:   VITALS SIGNS:    Visit Vitals  BP (!) 96/54   Pulse 90   Temp (!) 100.8 °F (38.2 °C)   Resp 21   Ht 5' 6\" (1.676 m)   Wt 67.8 kg (149 lb 7.6 oz)   SpO2 97%   BMI 24.13 kg/m²       O2 Device: Endotracheal tube, Ventilator   O2 Flow Rate (L/min): 4 l/min   Temp (24hrs), Av.6 °F (37.6 °C), Min:98.2 °F (36.8 °C), Max:100.9 °F (38.3 °C)         PHYSICAL EXAM:  NAD  +ETT  +LVAD hum  Trace ankle edema  Donnelly  sedated      DATA REVIEW:     INTAKE / OUTPUT:   Last shift:       07 - 1900  In: -   Out: 155 [Urine:75; Drains:80]  Last 3 shifts:  190 -  0700  In: 5690.3 [I.V.:5015.3]  Out: 4320 [Urine:1940; Drains:1080]    Intake/Output Summary (Last 24 hours) at 2023 0834  Last data filed at 2023 0800  Gross per 24 hour   Intake 5550.74 ml   Output 4225 ml   Net 1325.74 ml           LABS:   Recent Labs     23  0155 02/15/23  1849 02/15/23  1444   WBC 11.8* 18.3* 20.4*   HGB 12.3 12.5 13.6   HCT 39.8 39.3 43.6   * 152 164       Recent Labs     23  0155 02/15/23  1846 02/15/23  1444 02/15/23  0310 23  0340    142 140 136 133*   K 4.1 4.2 4.4 4.8 4.4   * 111* 110* 102 103   CO2 18* 20* 18* 25 26   * 191* 139* 125* 147*   BUN 30* 28* 25* 31* 28*   CREA 1.81* 1.58* 1.37* 1.67* 1.49*   CA 8.8 8.3* 8.9 9.5 9.8   MG 2.7* 2.7* 3.1* 2.5* 2.5*   PHOS  --   --   --  3.8 3.9   ALB 3.8 3.4* 2.9* 3.4* 3.3*   TBILI 3.4* 4.8* 3.6* 1.0 0.7   ALT 23 23 26 24 22   INR 1.4* 1.3* 1.2*  --   --            Assessment:  TANNER on CKD-3a: Suspect cardiorenal effects with needed diuresis. Now has LVAD and need to watch for POST OP ATN. Cr upto 1.8. non oliguric     Ischemic CM: Recurrent exacerbations. EF 20%. On continuous Milrinone since last admission.  S/p LVAD on 2/15     BPH: s/p donnelly     Well differentiated NET Grade 1: noted on EGD 23     Anemia 2 to CKD: improved    High Mg      Left UE basilic and radial vein thrombus    TANNER had resolved. Now bumped to 1.8 post LVAD. UOP of 1.6 L/24 hrs.       Plan/Recommendations:  Ct supp care  Pressors  I&Os  Daily labs  Avoid nephrotoxins    Discussed with LORI Stahl MD

## 2023-02-16 NOTE — PROGRESS NOTES
CRITICAL CARE NOTE      Name: Destiny Hernandez   : 1950   MRN: [id number]   Date: 2023      Reason for ICU Admission: Acute on chronic HFrEF, LVAD workup     ICU PROBLEM LIST   Acute on chronic HFrEF (20%) NYHA IIIb/IV (D) on home inotropic support, life vest  TANNER on CKD3  Aflutter w/ RVR  Acute on chronic hypoxemic respiratory failure  CAP  CAD  Gastric neuroendocrine tumor  Hx of LUE DVT  DM  PAD  TRISTAN  BPH w/ urinary retention requiring chronic donnelly    HISTORY OF PRESENT ILLNESS:   Pt is a 71 y.o M w/ PMH as noted above admitted [facility] on  due to ongoing symptoms secondary to his HFrEF. Treated for possible CAP, and diuresed for acute on chronic HFrEF. Nephrology following for TANNER on CKD 3. AHF team recommended transfer to CVICU for [facility] placement and ongoing LVAD workup, with placement 2/15.     24 HOUR EVENTS:   No overnight events, received IVFs, remains on epi and dobutamine gtt. Sedated, on MV w/ Angella for RV support. POCUS this AM w/ continued RV dysfunction. Vpaced, underlying intermittent heart block. Good UOP. NEUROLOGICAL:    Propofol, Precedex, fentanyl gtt  Goal RASS 0 to -1  RASHAD   Delirium precautions  Encourage work w/ PT/OT once  extubated    PULMONOLOGY:   Lung protective ventilation  wean Angella  Keep intubated until hemodynamics more stable  SBT   Monitor blood gas    CARDIOVASCULAR:   On epi, dobutamine,Angella  for RV  support  Weaning Angella, milrinone added to assist wean off  and epi  Cardene as SVR elevated w.  MAP >70, wean as able  Lct up o/n, trended back down this AM  LVAD  3L @ 4800 RPM  Monitor chest tube output  V  paced to 86, monitor for return of underlying rhythm  C/w ASA, amiodarone, statin  Discuss amiodarone with CTS if continued heart block  Goal euvolemia  close hemodynamic monitoring, trend lct,  monitor for evidence  of hypoperfusion   Unable to tolerate BB, ARNI/ARB due to , aldactone held 2/2 elevated K     GASTROINTESTINAL:   NPO, ADAT once extubated  PPI     RENAL/ELECTROLYTE/FLUIDS:   Strict I/Os,  goal  euvolemia  Nephrology following  Monitor RFP  Mg/Phos/K >2/3/4  Vasquez to remain in place    ENDOCRINE:   Insulin gtt per protocol  Resume SSI, Basal insulin when indicated  Hypoglycemic protocol  Glucose goal 120-180    HEMATOLOGY/ONCOLOGY:   Heparin gtt, VAD protocol  EPO weekly  Gastric neuroendocrine tumor, well differentiated, good prognosis per onc. ID/MICRO:   Tana  VAD implant Vanc,  rif, levaquin, fluconazole    ICU DAILY CHECKLIST     Code Status:Full  DVT Prophylaxis:heparin  T/L/D: PAC, PIV,  Vasquez,  V  wire,  valentin  x5, LVAD drive line  SUP: PPI  Diet: NPO  Activity Level:ad jose f  ABCDEF Bundle/Checklist Completed:Yes  Disposition: Stay in ICU  Multidisciplinary Rounds Completed:  yes  Patient/Family Updated: Yes    Tatiana   2/3 Transferred to CVICU for [facility] placement  2/7 started on dobutamine as adjunct to milrinone    SUBJECTIVE:     As noted above    Review of Systems:     Review of Systems   Unable to perform ROS: Intubated   Constitutional:  Negative for chills, fever and malaise/fatigue. HENT:  Negative for congestion and sinus pain. Eyes:  Negative for blurred vision, double vision and pain. Respiratory:  Negative for hemoptysis, sputum production and shortness of breath. Cardiovascular:  Negative for chest pain, palpitations and leg swelling. Gastrointestinal:  Negative for abdominal pain, nausea and vomiting. Genitourinary:  Negative for dysuria, frequency and urgency. Musculoskeletal:  Negative for back pain, joint pain and myalgias. Skin:  Negative for itching and rash. Neurological:  Negative for tremors, sensory change, speech change and focal weakness. Psychiatric/Behavioral:  Negative for hallucinations. The patient is not nervous/anxious.        OBJECTIVE:     Labs and Data: Reviewed 02/16/23  Medications: Reviewed 02/16/23  Imaging: Reviewed 02/16/23    General - sedated, intubated chronically ill appearing  HEENT- pupils equal, nystagmus, anicteric  Neuro - sedated, no focal deficit  CV - Paced,  VAD hum, post sternotomy, valentin  x5  Resp - rales b/l, NC  Abd - soft, distended, nontender, no guarding  MSK- intact, no sacral ulcer      Visit Vitals  BP (!) 96/54   Pulse 85   Temp (!) 101 °F (38.3 °C)   Resp 21   Ht 5' 6\" (1.676 m)   Wt 67.8 kg (149 lb 7.6 oz)   SpO2 97%   BMI 24.13 kg/m²    O2 Flow Rate (L/min): 4 l/min O2 Device: Endotracheal tube, Ventilator Temp (24hrs), Av.9 °F (37.7 °C), Min:98.2 °F (36.8 °C), Max:101 °F (38.3 °C)    CVP (mmHg): 12 mmHg (23 1100)      Intake/Output:     Intake/Output Summary (Last 24 hours) at 2023 1158  Last data filed at 2023 1100  Gross per 24 hour   Intake 6395.86 ml   Output 4112 ml   Net 2283.86 ml         Imaging    23    ECHO ADULT FOLLOW-UP OR LIMITED 2023    Interpretation Summary    Left Ventricle: EF by visual approximation is 20%. Left ventricle is mildly dilated. Mitral Valve: Moderately thickened leaflet, at the anterior and posterior leaflets. Moderately calcified leaflet. Moderate regurgitation. Left Atrium: Left atrium is moderately dilated. Technical qualifiers: Echo study was technically difficult with poor endocardial visualization. Contrast used: Definity. Limited study, not all structures viewed    Signed by: Shyann Singh MD on 2023  4:39 PM       Pertinent imaging reviewed and interpreted as noted above    CRITICAL CARE DOCUMENTATION  I had a face to face encounter with the patient, reviewed and interpreted patient data including clinical events, labs, images, vital signs, I/O's, and examined patient.   I have discussed the case and the plan and management of the patient's care with the consulting services, the bedside nurses and the respiratory therapist.      NOTE OF PERSONAL INVOLVEMENT IN CARE   This patient has a high probability of imminent, clinically significant deterioration, which requires the highest level of preparedness to intervene urgently. I participated in the decision-making and personally managed or directed the management of the following life and organ supporting interventions that required my frequent assessment to treat or prevent imminent deterioration. I personally spent 60 minutes of critical care time. This is time spent at this critically ill patient's bedside actively involved in patient care as well as the coordination of care. This does not include any procedural time which has been billed separately. Anibal A. Duane Crooks, MD  Staff [address] [facility]

## 2023-02-16 NOTE — OP NOTES
[address]  OPERATIVE REPORT    Name:  Olive Kay  MR#:  [id number]  :  1950  ACCOUNT #:  [de-identified]  DATE OF SERVICE:  02/15/2023    PREOPERATIVE DIAGNOSIS:  New York Heart Association class IV acute-on-chronic systolic heart failure (stage D heart failure). POSTOPERATIVE DIAGNOSIS:  New York Heart Association class IV acute-on-chronic systolic heart failure (stage D heart failure). PROCEDURES PERFORMED:  1. Insertion of ventricular assist device, implantable, intracorporeal, single ventricle (CPT code 65316; HeartMate 3 left ventricular assist device). 2.  Reoperation one month after original CABG procedure. 3.  Common femoral artery exploration for the purpose of peripheral cardiopulmonary bypass (CPT code 16935). SURGEON:  Eder Gonzalez MD    ASSISTANT:  SCAR Lara    ANESTHESIA:  General endotracheal anesthesia. COMPLICATIONS:  None. SPECIMENS REMOVED:  Apical core. IMPLANTS:  HeartMate 3 left ventricular assist device. ESTIMATED BLOOD LOSS:  50 mL. INDICATIONS:  The patient is a very pleasant 21-year-old gentleman suffering from end-stage heart failure (stage D heart failure), now undergoing left ventricular assist device placement. PROCEDURE:  The patient was brought to the operating room, had a right radial A-line placed without complications, Ruther[place]-Marce catheter was placed without complications, underwent general endotracheal anesthesia without complications. Chest, abdomen and lower extremities were prepped and draped in the usual fashion. A midline incision was made on the patient's sternum. Cautery dissection was used to dissect down to the level of sternal bone, and the sternal bone was then opened with an oscillating saw. During this portion of the procedure, we accessed the right common femoral vein and right common femoral artery and eventually went on bypass through a 21-Swiss arterial and 25-Swiss venous cannula.   We then dissected out the mediastinum, freed up the heart and aorta, gave an appropriate dose of heparin and cannulated, tilted the heart up, cored out the apex, placed 2-0 Ti-Cron sutures circumferentially around the cored-out apex, brought it to the inflow ring and then tied it down. We then used double running 5-0 Prolene suture, sewing the rest of the ring. We attached the pump, de-aired it, started the pump at 3000, extended the graft to the level of the aorta, cut it, placed a side biter clamp, opened the aorta with 75 blade and further extended the arteriotomy with forward and reverse Lindsay and then performed an outflow graft to aortic anastomosis using a 5-0 Prolene suture. We then de-aired the graft and started the pump, came off bypass successfully. Vicryl suture was used to close the subcutaneous tissue as well as wires for the bone.   I was present for the entire procedure.; ; PA-C assistance was needed due to difficulty of procedure, dissection, and identification of pertinent anatomy throughout the case         Josias Gomez MD      [id number]  D:  02/16/2023 12:16  T:  02/16/2023 18:40  JOB #:  [id number]

## 2023-02-16 NOTE — PROGRESS NOTES
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, LVEF 25-30%, stage D, NYHA class IV on chronic inotropic support  Patient was approved for LVAD implantation as destination therapy at [facility] 2/3/23; the following have been identified and d/w patient as relative concerns pertaining to LVAD candidacy: small body surface area, cachexia, BMI 18, malnutrition, frailty, advanced age, muscular deconditioning, PVD (fingertips), urinary retention requiring donnelly catheter, neuroendocrine tumor class 1 requiring treatment after LVAD, mild neurocognitive dysfunction, chronic kidney disease and hearing loss requiring hearing aid  Patient underwent LVAD-DT implantation 2/15/23 by Dr. Tania Bernabe  C/b RV failure requiring high-dose dual inotropic support  C/b acute on chronic renal failure  C/b hepatic failure  C/b lactic acidosis        RECOMMENDATIONS:  Optimize inotropic/pressor and Angella support today before plans for SBT/extubation  Wean Angella to off due to low PA pressures  Start milrinone 0.1 mgc/kg/min, titrate slowly to maximum dose of 0.25 mcg/kg/min (CrCl 33) and observe hemodynamics and Cr before advancing further  Wean cardene as blood pressure improves with up-titration of milrinone  Wean dobutamine dose gradually as milrinone up-titrated today  Keep epi at 8 for now; reassess for wean after dobutamine switched to milrinone    Keep filling pressures DPA > 12 with albumins q6h; looks dry  Keep CVP 12-14 - this correlates with dry DPA 10-11  No beta-blockers due to hypotension and RV conditioning prior to LVAD  Cannot tolerate ARB/ARNi due to hypotension and upcoming surgical procedure   Cannot tolerate spironolactone due to hyperkalemia  Cannot tolerate jardiance due to significant diuresis on smallest dose/contributed to IVVD  Previously discontinued corlanor due to SVT  Digoxin stopped d/t risk for toxicity with amio loading  Discontinue amiodarone due to intermitted heart blocks under pacemaker  Use bumex 0.5mg IV q4h prn CVP > 14  Nephrology consultation to assist through renal failure  Keep K+ >4 and Mg >2  Continue heparin gtt  Transfuse to keep hgb > 7  Perioperative antibiotics per protocol  Start baby dose aspirin  Received preauthorization     All other care per primary team      INTERVAL HISTORY:  POD1  Low grade temp 100  MAPs at goal, HR paced in 80s, intermittent HB underneath  LVAD function normal, no events on interrogation  Dobutamine 5, epi 8  I/O net positive 1.48cc, urine 1.69cc  WBC 11.8 from 18.3  Hgb 12.3    Cr 1.8 from 1.58  TBili 3.4 from 4.8  Lactic 1.5 from 3.7 from 1.5  CO2 18     IMPRESSION:  Acute on chronic combined systolic/diastolic  heart failure  Stage D, NYHA class IV symptoms   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, LVEF 20% (by echo 1/2023) and 23% (by cMRI 1/3/23)  Cardiac MRI suggestive of ischemic cardiomyopathy  PYP equivocal  Chronic milrinone and dobutamine infusion as palliation   Coronary artery disease  CAD s/p CABG x 2: further disease best managed medically due to small vessel size   At risk of sudden cardiac death  Peripheral arterial disease  Bilateral hydronephrosis s/p donnelly  Cardiac risk factors:  HTN  HL  TRISTAN, STOP-BANG 4  DM2  CKD, stage 3  MOCA from 2/9 19/30, consistent with mild cognitive impairment  Hard of hearing  Gastric Neuroendocrine Tumor, elevated gastrin and chromogranin A  Sepsis, unclear source- resolved         LIFE GOALS:  Lifestyle goals reviewed with the patient.   Patient's personal goals include: having a few more years with family  Important upcoming milestones or family events: None at this time   The patient identifies the following as important for living well: being at home, not being SOB              CARDIAC IMAGING:  Echo 1/27/23    Left Ventricle: EF by visual approximation is 20%. Left ventricle is mildly dilated. Mitral Valve: Moderately thickened leaflet, at the anterior and posterior leaflets. Moderately calcified leaflet. Moderate regurgitation. Left Atrium: Left atrium is moderately dilated. Technical qualifiers: Echo study was technically difficult with poor endocardial visualization. Contrast used: Definity. Limited study, not all structures viewed     Echo 1/9/23   Left Ventricle: Severely reduced left ventricular systolic function with a visually estimated EF of 25 - 30%. Left ventricle size is normal. Mildly increased wall thickness. Echo 12/26/22    Left Ventricle: Severely reduced left ventricular systolic function with a visually estimated EF of 25 - 30%. Left ventricle size is normal. Normal wall thickness. There are regional wall motion abnormalities. Grade II diastolic dysfunction with increased LAP. Right Ventricle: Moderately reduced systolic function. TAPSE is abnormal. TAPSE is 1.1 cm. Aortic Valve: Mild stenosis of the aortic valve. AV peak gradient is 13 mmHg. AV peak velocity is 1.8 m/s. Mitral Valve: Not well visualized. Moderate annular calcification at the posterior leaflet of the mitral valve. Mild to moderate regurgitation. Tricuspid Valve: Mildly elevated RVSP. Left Atrium: Left atrium is moderately dilated. 12/8/22    Left Ventricle: Moderately reduced left ventricular systolic function with a visually estimated EF of 35 - 40%. Severe hypokinesis of the following segments: mid anteroseptal, apical anterior, apical septal, apical inferior and apical lateral. Severe hypokinesis of the apex. Mitral Valve: Severely thickened leaflet, at the anterior and posterior leaflets.  Severely calcified leaflet, at the anterior and posterior leaflets. Mild annular calcification of the mitral valve. Moderate regurgitation. Left Atrium: Left atrium is mildly dilated. Contrast used: Definity. limited study     EKG 12/22/22 ST, Biatria enlargement, marked ST abnormality     [facility] 12/6/22  1. Normal LVEDP  2. Severe native multivessel coronary artery disease  3. Patent LIMA to LAD and vein graft to distal RCA  4. Recurrent ISR in OM1 stent with now 60 to 70% restenosis  5. Recoil of left main and circumflex stent with now recurrent 40 to 50% stenosis. 6.  Progression of ostial left main disease now to about 60% stenosis  7. Progression of disease in jailed first marginal branch now with diffuse 90% stenosis  8. High-grade stenosis in the mid to distal right potential femoral artery treated with 6 x 40 mm impact drug-coated balloon angioplasty to reduce the stenosis to less than 40%        HEMODYNAMICS:  [facility] 1/9/23  PA 20/9, RA 3, PCWP 8, CI 1.8     CPEST too ill   6MW 300 feet    LVAD INTERROGATION:  Device interrogated in person  Device function normal, normal flow, no events  LVAD   Pump Speed (RPM): 4800  Pump Flow (LPM): 2.9  PI (Pulsitility Index): 3.1  Power: 3.1   Test: Yes  Back Up  at Bedside & Labeled: Yes  Power Module Test: Yes  Driveline Site Care: No  Driveline Dressing: Clean, Dry, and Intact  Testing  Alarms Reviewed: Yes  Back up SC speed: 4800  Back up Low Speed Limit: 4400  Emergency Equipment with Patient?: Yes  Emergency procedures reviewed?: No  Drive line site inspected?: No  Drive line intergrity inspected?: Yes  Drive line dressing changed?: No    PHYSICAL EXAM:  Visit Vitals  BP (!) 96/54   Pulse 86   Temp (!) 100.8 °F (38.2 °C)   Resp 21   Ht 5' 6\" (1.676 m)   Wt 149 lb 7.6 oz (67.8 kg)   SpO2 97%   BMI 24.13 kg/m²     General: Patient is intubated and sedated  HEENT: Unremarkable   Neck: Supple. No evidence of thyroid enlargements or lymphadenopathy.   JVD: Cannot be appreciated   Lungs: Breath sounds are equal and clear bilaterally. No wheezes, rhonchi, or rales. Heart: VAD hum, sternal wound clean and dry, chest tubes  Abdomen: No BS. Genitourinary and rectal: deferred  Extremities: No cyanosis, clubbing, or edema. Neurologic: sedated. Psychiatric: sedated  Skin: Warm, dry and well perfused hands, cold mottled feet    REVIEW OF SYSTEMS:  General: Unable to obtain. PAST MEDICAL HISTORY:  Past Medical History:   Diagnosis Date    CAD (coronary artery disease) 11/10/2016    NSTEMI & 2 stents    Deafness 10/28/2012    DM (diabetes mellitus) ([facility]     Elevated cholesterol     Hypertension     NSTEMI (non-ST elevated myocardial infarction) ([facility] [address] 11/10/2016       PAST SURGICAL HISTORY:  Past Surgical History:   Procedure Laterality Date    COLONOSCOPY N/A 6/28/2018    COLONOSCOPY performed by Sanjuana Spicer MD at [facility] ENDOSCOPY    COLONOSCOPY N/A 1/18/2023    COLONOSCOPY performed by Marcy Maloney MD at [facility] ENDOSCOPY    [address]  11/11/2016    2 stents       FAMILY HISTORY:  Family History   Problem Relation Age of Onset    Heart Disease Father     Heart Attack Father     Hypertension Mother     Elevated Lipids Brother     Elevated Lipids Brother     No Known Problems Sister     Elevated Lipids Brother     No Known Problems Son     No Known Problems Daughter     Anesth Problems Neg Hx        SOCIAL HISTORY:  Social History     Socioeconomic History    Marital status:    Tobacco Use    Smoking status: Never     Passive exposure: Never    Smokeless tobacco: Never   Vaping Use    Vaping Use: Never used   Substance and Sexual Activity    Alcohol use:  Yes     Alcohol/week: 2.0 standard drinks     Types: 1 Cans of beer, 1 Shots of liquor per week     Comment: rarely    Drug use: No    Sexual activity: Yes     Social Determinants of Health     Financial Resource Strain: Medium Risk    Difficulty of Paying Living Expenses: Somewhat hard   Food Insecurity: Food Insecurity Present    Worried About [address] in the Last Year: Never true    Ran Out of Food in the Last Year: Often true       LABORATORY RESULTS:     Labs Latest Ref Rng & Units 2/16/2023 2/15/2023 2/15/2023 2/15/2023 2/15/2023 2/15/2023 2/14/2023   WBC 4.1 - 11.1 K/uL 11. 8(H) 18. 3(H) - 20. 4(H) - 9.6 7.1   RBC 4.10 - 5.70 M/uL 4.61 4.62 - 5.03 - 4.01(L) 4.13   Hemoglobin 12.1 - 17.0 g/dL 12.3 12.5 - 13.6 - 10. 1(L) 10. 2(L)   Hematocrit 36.6 - 50.3 % 39.8 39.3 - 43.6 - 34. 9(L) 35. 8(L)   MCV 80.0 - 99.0 FL 86.3 85.1 - 86.7 - 87.0 86.7   Platelets 123 - 249 K/uL 143(L) 152 - 164 - 247 230   Lymphocytes 12 - 49 % 8(L) - - 6(L) - 13 16   Monocytes 5 - 13 % 6 - - 8 - 7 7   Eosinophils 0 - 7 % 0 - - 0 - 1 4   Basophils 0 - 1 % 1 - - 0 - 0 1   Albumin 3.5 - 5.0 g/dL 3.8 - 3.4(L) 2. 9(L) 3.4(L) - 3. 3(L)   Calcium 8.5 - 10.1 MG/DL 8.8 - 8. 3(L) 8.9 9.5 - 9.8   Glucose 65 - 100 mg/dL 214(H) - 191(H) 139(H) 125(H) - 147(H)   BUN 6 - 20 MG/DL 30(H) - 28(H) 25(H) 31(H) - 28(H)   Creatinine 0.70 - 1.30 MG/DL 1.81(H) - 1.58(H) 1.37(H) 1.67(H) - 1.49(H)   Sodium 136 - 145 mmol/L 142 - 142 140 136 - 133(L)   Potassium 3.5 - 5.1 mmol/L 4.1 - 4.2 4.4 4.8 - 4.4   TSH 0.36 - 3.74 uIU/mL - - - - - - -   PSA 0.01 - 4.0 ng/mL - - - - - - -   LDH 85 - 241 U/L 399(H) - - 442(H) - - -   Some recent data might be hidden     Lab Results   Component Value Date/Time    TSH 3.52 01/28/2023 05:26 AM    TSH 2.12 12/27/2022 02:36 PM    TSH 4.80 (H) 12/06/2022 03:53 AM    TSH 5.39 (H) 10/12/2022 09:10 AM    TSH 3.53 02/03/2022 11:47 AM    TSH 5.790 (H) 11/21/2019 04:45 PM    TSH 3.08 06/22/2018 01:53 PM    TSH 4.250 05/26/2015 09:43 AM       ALLERGY:  Allergies   Allergen Reactions    Ambien [Zolpidem] Other (comments)     Causes extreme confusion        CURRENT MEDICATIONS:    Current Facility-Administered Medications:     HYDROmorphone (DILAUDID) injection 0.5 mg, 0.5 mg, IntraVENous, Q3H PRN, Cara Ruiz MD    HYDROmorphone (DILAUDID) injection 1 mg, 1 mg, IntraVENous, Q4H PRN, Lesly MOLINA MD    heparin 25,000 units in D5W 250 ml infusion, 12-25 Units/kg/hr, IntraVENous, TITRATE, Jo Jaffe MD, Held at 02/16/23 1000    milrinone (PRIMACOR) 20 MG/100 ML D5W infusion, 0-0.75 mcg/kg/min, IntraVENous, TITRATE, Jo Jaffe MD, Last Rate: 2 mL/hr at 02/16/23 1002, 0.1 mcg/kg/min at 02/16/23 1002    albumin human 25% (BUMINATE) solution 12.5 g, 12.5 g, IntraVENous, Q6H, Jo Jaffe MD, 12.5 g at 02/16/23 1019    nitroGLYcerin (Tridil) 200 mcg/ml infusion, 0-20 mcg/min, IntraVENous, TITRATE, Shaila, Lizette B, NP, Held at 02/16/23 0000    insulin regular (MYXREDLIN, NOVOLIN, HUMULIN) 100 units/100 ml NS infusion (premix), 0-50 Units/hr, IntraVENous, TITRATE, Shaila, Lizette B, NP, Stopped at 02/16/23 0950    glucose chewable tablet 16 g, 4 Tablet, Oral, PRN, Shaila, Lizette B, NP    glucagon (GLUCAGEN) injection 1 mg, 1 mg, IntraMUSCular, PRN, Shaila, Lizette B, NP    insulin lispro (HUMALOG) injection, , SubCUTAneous, TIDAC, Shaila, Lizette B, NP    insulin lispro (HUMALOG) injection, , SubCUTAneous, AC&HS, Shaila, Lizette B, NP    insulin glargine (LANTUS) injection 1-50 Units, 1-50 Units, SubCUTAneous, ONCE PRN, Shaila, Lizette B, NP    sodium chloride (NS) flush 5-40 mL, 5-40 mL, IntraVENous, Q8H, Shaila, Lizette B, NP    sodium chloride (NS) flush 5-40 mL, 5-40 mL, IntraVENous, PRN, Shaila, Lizette B, NP    naloxone (NARCAN) injection 0.4 mg, 0.4 mg, IntraVENous, PRN, Shaila, Lizette B, NP    mupirocin (BACTROBAN) 2 % ointment, , Both Nostrils, BID, Shaila, Lizette B, NP, Given at 02/16/23 0940    [START ON 2/17/2023] levoFLOXacin (LEVAQUIN) 500 mg in D5W IVPB, 500 mg, IntraVENous, Q48H, Shaila, Lizette B, NP    rifAMPin (RIFADIN) 600 mg in 0.9% sodium chloride 100 mL IVPB, 600 mg, IntraVENous, DAILY, Shaila, Lizette B, NP, Last Rate: 200 mL/hr at 02/16/23 7495, 600 mg at 02/16/23 0906    fluconazole (DIFLUCAN) 200mg/100 mL IVPB (premix), 200 mg, IntraVENous, Q24H, Shaila, Lizette B, NP, Last Rate: 100 mL/hr at 02/16/23 0416, 200 mg at 02/16/23 0416    amiodarone (CORDARONE) tablet 400 mg, 400 mg, Oral, Q12H, Shaila, Lizette B, NP    ELECTROLYTE REPLACEMENT NOTE: Nurse to review Serum Potassium and Magnesuim levels and Initiate Electrolyte Replacement Protocol as needed, 1 Each, Other, PRN, Shaila, Lizette B, NP    dextrose 10 % infusion 0-250 mL, 0-250 mL, IntraVENous, PRN, Shaila, Lizette B, NP, 50 mL at 02/16/23 0953    acetaminophen (TYLENOL) tablet 650 mg, 650 mg, Oral, Q6H PRN, Shaila, Lizette B, NP, 650 mg at 02/16/23 1019    oxyCODONE IR (ROXICODONE) tablet 10 mg, 10 mg, Oral, Q4H PRN, Shaila, Lizette B, NP    morphine injection 2 mg, 2 mg, IntraVENous, Q4H PRN, Shaila, Lizette B, NP    vancomycin (VANCOCIN) 750 mg in 0.9% sodium chloride 250 mL ([id number]), 750 mg, IntraVENous, Q24H, Shaila, Lizette B, NP, Last Rate: 250 mL/hr at 02/15/23 1835, 750 mg at 02/15/23 1835    atorvastatin (LIPITOR) tablet 10 mg, 10 mg, Oral, QPM, Shaila, Lizette B, NP    ondansetron (ZOFRAN) injection 4 mg, 4 mg, IntraVENous, Q4H PRN, Shaila, Lizette B, NP    albuterol-ipratropium (DUO-NEB) 2.5 MG-0.5 MG/3 ML, 3 mL, Nebulization, Q6H PRN, Shaila, Lizette B, NP    aspirin chewable tablet 81 mg, 81 mg, Oral, DAILY, Shaila, Lizette B, NP, 81 mg at 02/16/23 0907    chlorhexidine (PERIDEX) 0.12 % mouthwash 10 mL, 10 mL, Oral, BID, Shaila, Lizette B, NP, 10 mL at 02/16/23 0941    senna-docusate (PERICOLACE) 8.6-50 mg per tablet 1 Tablet, 1 Tablet, Oral, DAILY, Shaila, Lizette B, NP, 1 Tablet at 02/16/23 0907    polyethylene glycol (MIRALAX) packet 17 g, 17 g, Oral, DAILY, Shaila, Lizette B, NP, 17 g at 02/16/23 0906    bisacodyL (DULCOLAX) suppository 10 mg, 10 mg, Rectal, DAILY PRN, Shaila, Lizette B, NP    sucralfate (CARAFATE) tablet 1 g, 1 g, Oral, AC&HS, Shaila, Chiara Jackson NP    propofol (DIPRIVAN) 10 mg/mL infusion, 0-50 mcg/kg/min, IntraVENous, TITRATE, Lizette Linton NP, Last Rate: 8.5 mL/hr at 02/16/23 1013, 25 mcg/kg/min at 02/16/23 1013    Vancomycin - Pharmacy to dose, , Other, Rx Dosing/Monitoring, Lizette Linton, NP    0.45% sodium chloride infusion, 10 mL/hr, IntraVENous, CONTINUOUS, Parker Motley MD, Last Rate: 10 mL/hr at 02/16/23 0936, 10 mL/hr at 02/16/23 0936    niCARdipine (CARDENE) 25 mg in 0.9% sodium chloride 250 mL ([id number]), 0-15 mg/hr, IntraVENous, TITRATE, Luz Maria MOLINA MD, Stopped at 02/16/23 1000    DOBUTamine (DOBUTREX) 500 mg/250 mL (2,000 mcg/mL) infusion, 0-10 mcg/kg/min, IntraVENous, TITRATE, Nael Johnson MD, Last Rate: 8.3 mL/hr at 02/16/23 0810, 5 mcg/kg/min at 02/16/23 0810    dexmedeTOMidine in 0.9 % NaCl (PRECEDEX) 400 mcg/100 mL (4 mcg/mL) infusion soln, 0.1-1.5 mcg/kg/hr, IntraVENous, TITRATE, Nael Johnson MD, Last Rate: 14.1 mL/hr at 02/16/23 1040, 1 mcg/kg/hr at 02/16/23 1040    0.9% sodium chloride infusion, 9 mL/hr, IntraVENous, CONTINUOUS, Nael Johnson MD, Last Rate: 9 mL/hr at 02/16/23 0809, 9 mL/hr at 02/16/23 0809    amiodarone (CORDARONE) 900 mg/250 ml D5W infusion, 0.5-1 mg/min, IntraVENous, TITRATE, Lizette Linton, NP    EPINEPHrine (ADRENALIN) 10 mg in 0.9% sodium chloride 250 mL infusion, 0-10 mcg/min, IntraVENous, TITRATE, Lizette Linton NP, Last Rate: 12 mL/hr at 02/16/23 0810, 8 mcg/min at 02/16/23 0810    NOREPINephrine (LEVOPHED) 32,000 mcg in dextrose 5% 250 mL (128 mcg/mL) infusion, 0.5-16 mcg/min, IntraVENous, TITRATE, Shaila, Lizette B, NP, Last Rate: 0.469 mL/hr at 02/15/23 1344, 1 mcg/min at 02/15/23 1344    PHENYLephrine (JONELLE-SYNEPHRINE) 100 mg in 0.9% sodium chloride 250 mL infusion,  mcg/min, IntraVENous, TITRATE, Shaila, Lizette B, NP    vasopressin (VASOSTRICT) 20 Units in 0.9% sodium chloride 100 mL infusion, 0-0.04 Units/min, IntraVENous, TITRATE, Shaila, Marixa Melvin NP    PATIENT CARE TEAM:  Patient Care Team:  Mason Palomino MD as PCP - General (Family Medicine)  Mason Palomino MD as PCP - [facility]  Leopoldo Cox MD (Cardiovascular Disease Physician)  Harry Teague MD (Gastroenterology)  Jesus Blue MD (Cardiothoracic Surgery)  Omar Guillory MD (Cardiovascular Disease Physician)  Yoselin Olvera MD (Nephrology)  Flower Stern MD (Pulmonary Disease)  Leighton Carlos MD ([address] [place] [address] Vascular Surgery)     Thank you for allowing me to participate in this patient's care.     Dangelo Greer MD   [address] [place] [address], [address]  Phone: [phone]    Total Patient Care Time:60 minutes    Critically ill

## 2023-02-16 NOTE — PROGRESS NOTES
Bedside and Verbal shift change report given to [address] (oncoming nurse) by Conchis Herzog (offgoing nurse). Report included the following information SBAR, OR Summary, and Intake/Output. Appropriateness and documentation of restraints has been reviewed with the off-going RN. Restraint order is current and valid.  Pt opening eyes to voice, obey commands in bilat UE    2030 Intensivist at bedside, CXR showing ETT needs advanced, ETT advanced to 22 at teeth, repeat CXR    2100 CI 1.7, SVR high [id number],  to 2.5     CI still 1.7, SVR still high >1800,  to 3     Pt opening eyes to voice, thumbs up on command in bilat UE  Wiggles toes in bilat LE    214 Intensivist at bedside, Epi to 10 for RV support    220 Chest tube output <50ml/hr  Urine output 40-60 mL/hr    2220 Albumin 5% 25g per intensivist    2300 Per RN report pt is supposed to have a fixed dose heparin gtt starting at midnight. No order in chart or information in a note. Contacted Pharmacy, pharmacy is unaware of heparin   Heart Failure NP paged - unsure of heparin gtt doseage    2330 Cardiac Surgery [facility] paged, also unsure of heparin doseage, will check with intensivist    0100 Pt open eyes to voice, obey commands in all four extremities    0200 Still unsure of heparin doseage, Will start at 10ml/hr per cardiac surg PA    0300 Lactic Acid back at 3.7, intensivist notified, dec epi to 8    0400 propofol paused, Pt nodding head yes/no to questions, gives thumbs up in both hands, moves both feet on command  Pt ahkes head \"no\" when asked if he is in pain    0500 CI 1.4-1.5, CVP 14 (down from 17-20); MAP 60-65  Intensivist notified, 25g 5% albumin given    0800 Bedside and Verbal shift change report given to [employer] (oncoming nurse) by Amanda Fox RN (offgoing nurse). Report included the following information SBAR, Kardex, ED Summary, and Cardiac Rhythm v-paced .

## 2023-02-16 NOTE — PROGRESS NOTES
0800: Bedside and Verbal shift change report given to Montserrat SANDOVAL (oncoming nurse) by Ashley Dumont RN (offgoing nurse). Report included the following information SBAR and Cardiac Rhythm V Paced . Cardiac gtts: Epi 8, Dobut 5, Cardene 2.5  LVAD: 4800/4400, PF 3, PI 2.9, PP 3.1. No alarms. Heparin 400u/hr. Vent: A/C R16, Vt 380, PEEP 5, FiO2 60%  Current HD: CI 1.9, SvO2 71%, SVR 1700, PAs 25/15, CVP 13  Dr Rory Corona at bedside - updated on pt's current status: NNO at this time. 0845: Dr Rory Corona at [address] [place] [address]. Underlying rhythm checked - appears to be AVB T2 50s-60s. Pacer returned to V Pacing @ 86 mA 12.    0900: RT at bedside - Angella decreased to 15 per verbal order by Dr Rory Corona.    0902: Updated Dr Fatou Rosario on PTT > 130 - telephone order to stop Heparin and update HF team. Updated Dr Madhuri Andrade - telephone order to initiate weight based protocol. 0930: Dr Madhuri Andrade at bedside - plan for the day: start Milrinone at 0.1 with attempt to wean Cardene and then Dobut. Keep Epi at 8. Schedule 25% Albumins Q6. Maintain MAPs 65-70. Continue to wean Angella per Intensivist order. 1058: Discussed PTT result and Heparin order with Kimber (Pharmacy) - Heparin restarted at 12u/kg/hr. 1045: RT at bedside - Angella decreased to 10.    1300: Dr Myriam Burrows at bedside - updated on pt's current status. NNO at this time. 1325: RT at bedside - Angella decreased to 5.    1520: RT at bedside - Angella decreased to 2.    1827: PTT > 130 - Heparin stopped per protocol. 1620: Updated Dr Madhuri Andrade on pt's progression throughout shift and current hemodynamics/gtts, including temp >101. Telephone order to increase Milrinone to 0.2. Will attempt to wean Dobutamine. Readback provided. 1730: Dr Rory Corona at bedside - updated on pt's current hemodynamic status/gtts as well as temp. NNO at this time - will continue with Angella wean. 1730: RT at bedside - Angella decreased to 1.    1900: Angella decreased to 0. Poorly tolerated. MAPs decreased from 68 to 58. PAD/CVP increase from 12 to 18. Phone call to Dr Vanesa Pope - plan to restart Angella @ 1. If MAP does not > 65 - decrease Milrinone to 0.1. Will draw ABG, send Lactic, BMP and INR. 1936: Heparin restarted at 9.    1943: Milrinone decreased 0.1.    2000: Updated Dr Vanesa Pope on pt's current hemodynamic status and MAP < 65. Telephone order to stop Milrinone. 2000: Bedside and Verbal shift change report given to [address] [place] [address] (oncoming nurse) by Erika Negrete (offgoing nurse). Report included the following information SBAR and Cardiac Rhythm V Paced .

## 2023-02-17 LAB
ADMINISTERED INITIALS, ADMINIT: NORMAL
D50 ADMINISTERED, D50ADM: 0 ML
D50 ADMINISTERED, D50ADM: 10 ML
D50 ADMINISTERED, D50ADM: 14 ML
D50 ADMINISTERED, D50ADM: 9 ML
D50 ORDER, D50ORD: 0 ML
D50 ORDER, D50ORD: 10 ML
D50 ORDER, D50ORD: 14 ML
D50 ORDER, D50ORD: 9 ML
GLUCOSE, GLC: 101 MG/DL
GLUCOSE, GLC: 104 MG/DL
GLUCOSE, GLC: 115 MG/DL
GLUCOSE, GLC: 119 MG/DL
GLUCOSE, GLC: 139 MG/DL
GLUCOSE, GLC: 144 MG/DL
GLUCOSE, GLC: 155 MG/DL
GLUCOSE, GLC: 157 MG/DL
GLUCOSE, GLC: 167 MG/DL
GLUCOSE, GLC: 65 MG/DL
GLUCOSE, GLC: 76 MG/DL
GLUCOSE, GLC: 78 MG/DL
GLUCOSE, GLC: 84 MG/DL
GLUCOSE, GLC: 85 MG/DL
GLUCOSE, GLC: 88 MG/DL
GLUCOSE, GLC: 94 MG/DL
GLUCOSE, GLC: 97 MG/DL
GLUCOSE, GLC: 97 MG/DL
GLUCOSE, GLC: 98 MG/DL
HIGH TARGET, HITG: 130 MG/DL
HIGH TARGET, HITG: 140 MG/DL
INSULIN ADMINSTERED, INSADM: 0 UNITS/HOUR
INSULIN ADMINSTERED, INSADM: 0.3 UNITS/HOUR
INSULIN ADMINSTERED, INSADM: 0.4 UNITS/HOUR
INSULIN ADMINSTERED, INSADM: 0.7 UNITS/HOUR
INSULIN ADMINSTERED, INSADM: 0.9 UNITS/HOUR
INSULIN ADMINSTERED, INSADM: 1 UNITS/HOUR
INSULIN ADMINSTERED, INSADM: 2.1 UNITS/HOUR
INSULIN ADMINSTERED, INSADM: 2.4 UNITS/HOUR
INSULIN ADMINSTERED, INSADM: 3 UNITS/HOUR
INSULIN ADMINSTERED, INSADM: 3.9 UNITS/HOUR
INSULIN ADMINSTERED, INSADM: 4.2 UNITS/HOUR
INSULIN ORDER, INSORD: 0 UNITS/HOUR
INSULIN ORDER, INSORD: 0.3 UNITS/HOUR
INSULIN ORDER, INSORD: 0.4 UNITS/HOUR
INSULIN ORDER, INSORD: 0.7 UNITS/HOUR
INSULIN ORDER, INSORD: 0.9 UNITS/HOUR
INSULIN ORDER, INSORD: 1 UNITS/HOUR
INSULIN ORDER, INSORD: 2.1 UNITS/HOUR
INSULIN ORDER, INSORD: 2.4 UNITS/HOUR
INSULIN ORDER, INSORD: 3 UNITS/HOUR
INSULIN ORDER, INSORD: 3.9 UNITS/HOUR
INSULIN ORDER, INSORD: 4.2 UNITS/HOUR
LOW TARGET, LOT: 100 MG/DL
LOW TARGET, LOT: 95 MG/DL
MINUTES UNTIL NEXT BG, NBG: 15 MIN
MINUTES UNTIL NEXT BG, NBG: 60 MIN
MULTIPLIER, MUL: 0
MULTIPLIER, MUL: 0.01
MULTIPLIER, MUL: 0.02
MULTIPLIER, MUL: 0.03
MULTIPLIER, MUL: 0.03
MULTIPLIER, MUL: 0.04
MULTIPLIER, MUL: 0.05
MULTIPLIER, MUL: 0.05
ORDER INITIALS, ORDINIT: NORMAL

## 2023-02-17 NOTE — PROGRESS NOTES
Physical Therapy  2/17/2023    Chart reviewed. Patient remains inappropriate for PT evaluation at this time. Will follow. Thank you.     Janis Dan, PT, DPT

## 2023-02-17 NOTE — PROGRESS NOTES
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, LVEF 25-30%, stage D, NYHA class IV on chronic inotropic support  Patient was approved for LVAD implantation as destination therapy at [facility] 2/3/23; the following have been identified and d/w patient as relative concerns pertaining to LVAD candidacy: small body surface area, cachexia, BMI 18, malnutrition, frailty, advanced age, muscular deconditioning, PVD (fingertips), urinary retention requiring donnelly catheter, neuroendocrine tumor class 1 requiring treatment after LVAD, mild neurocognitive dysfunction, chronic kidney disease and hearing loss requiring hearing aid  Patient underwent LVAD-DT implantation 2/15/23 by Dr. Lane Wayne  C/b RV failure requiring high-dose dual inotropic support  C/b acute on chronic renal failure  C/b hepatic failure  C/b lactic acidosis        RECOMMENDATIONS:  Continue current inotropic support for RV failure unchanged; manual TD  Continue dobutamine 5 mcg/kg/min  Continue epi at 8 mcg/kg/min  Continue cardene gtt to keep MAPs 65-75    Keep filling pressures DPA > 12 with albumins q6h; looks dry  Keep CVP 12-14 - this correlates with dry DPA 10-11  Use bumex 0.5mg IV q4h PRN CVP > 14  No beta-blockers due to hypotension and RV conditioning prior to LVAD  Cannot tolerate ARB/ARNi due to hypotension and upcoming surgical procedure   Cannot tolerate spironolactone due to hyperkalemia  Cannot tolerate jardiance due to significant diuresis on smallest dose/contributed to IVVD  Previously discontinued corlanor due to SVT  Digoxin stopped d/t risk for toxicity with amio loading  Discontinue amiodarone due to intermitted heart blocks under pacemaker  Nephrology consultation to assist through renal failure, d/w Dr. Jak Guo K+ >4 and Mg >2  Transfuse to keep hgb > 7  Perioperative antibiotics per protocol  Continue baby aspirin  Continue heparin gtt; check INR today at 14:00; coumadin 1mg at 18:00  Timing of extubation per intensivist and CTS  D/w Dr. Carmela Starkey, Dr. Annabelle Mccarty and bedside staff     All other care per primary team      INTERVAL HISTORY:  POD2  Failed milrinone due to hypotension  Failed Angella wean  Fever, Tmax 102.2 received tylenol and cooling blanket  MAPs 60-70s at goal, HR paced in 80s, intermittent HB underneath  LVAD function normal, no events on interrogation  Dobutamine 5, epi 8, cardene 1.5, precedex  I/O net positive 882cc, urine 1.06cc  30/16/21, Co/CI 2.9/1.8, SVR 1324, SVO2 66  WBC 13.1 from 11.8 from 18.3  Hgb 11.5 from 12.3  INR 1.6 from 1.4  Cr 2.14 from 1.96  TBili 5.9 from 3.4   from 399  Lactic 1.7  Pro-NT-BNP 8757 from 6612    CXR (2/17/23)  1. Persistent mild-to-moderate pulmonary edema. There is persistent bibasilar  atelectasis. 2. NG tube could be advanced 5 cm.  The sidehole is in the distal esophagus     IMPRESSION:  Acute on chronic combined systolic/diastolic  heart failure  Stage D, NYHA class IV symptoms   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, LVEF 20% (by echo 1/2023) and 23% (by cMRI 1/3/23)  Cardiac MRI suggestive of ischemic cardiomyopathy  PYP equivocal  Chronic milrinone and dobutamine infusion as palliation   Coronary artery disease  CAD s/p CABG x 2: further disease best managed medically due to small vessel size   At risk of sudden cardiac death  Peripheral arterial disease  Bilateral hydronephrosis s/p donnelly  Cardiac risk factors:  HTN  HL  TRISTAN, STOP-BANG 4  DM2  CKD, stage 3  MOCA from 2/9 19/30, consistent with mild cognitive impairment  Hard of hearing  Gastric Neuroendocrine Tumor, elevated gastrin and chromogranin A  Sepsis, unclear source- resolved         LIFE GOALS:  Lifestyle goals reviewed with the patient. Patient's personal goals include: having a few more years with family  Important upcoming milestones or family events: None at this time   The patient identifies the following as important for living well: being at home, not being SOB              CARDIAC IMAGING:  Echo 1/27/23    Left Ventricle: EF by visual approximation is 20%. Left ventricle is mildly dilated. Mitral Valve: Moderately thickened leaflet, at the anterior and posterior leaflets. Moderately calcified leaflet. Moderate regurgitation. Left Atrium: Left atrium is moderately dilated. Technical qualifiers: Echo study was technically difficult with poor endocardial visualization. Contrast used: Definity. Limited study, not all structures viewed     Echo 1/9/23   Left Ventricle: Severely reduced left ventricular systolic function with a visually estimated EF of 25 - 30%. Left ventricle size is normal. Mildly increased wall thickness. Echo 12/26/22    Left Ventricle: Severely reduced left ventricular systolic function with a visually estimated EF of 25 - 30%. Left ventricle size is normal. Normal wall thickness. There are regional wall motion abnormalities. Grade II diastolic dysfunction with increased LAP. Right Ventricle: Moderately reduced systolic function. TAPSE is abnormal. TAPSE is 1.1 cm. Aortic Valve: Mild stenosis of the aortic valve. AV peak gradient is 13 mmHg. AV peak velocity is 1.8 m/s. Mitral Valve: Not well visualized. Moderate annular calcification at the posterior leaflet of the mitral valve. Mild to moderate regurgitation. Tricuspid Valve: Mildly elevated RVSP. Left Atrium: Left atrium is moderately dilated. 12/8/22    Left Ventricle: Moderately reduced left ventricular systolic function with a visually estimated EF of 35 - 40%.  Severe hypokinesis of the following segments: mid anteroseptal, apical anterior, apical septal, apical inferior and apical lateral. Severe hypokinesis of the apex. Mitral Valve: Severely thickened leaflet, at the anterior and posterior leaflets. Severely calcified leaflet, at the anterior and posterior leaflets. Mild annular calcification of the mitral valve. Moderate regurgitation. Left Atrium: Left atrium is mildly dilated. Contrast used: Definity. limited study     EKG 12/22/22 ST, Biatria enlargement, marked ST abnormality     [facility] 12/6/22  1. Normal LVEDP  2. Severe native multivessel coronary artery disease  3. Patent LIMA to LAD and vein graft to distal RCA  4. Recurrent ISR in OM1 stent with now 60 to 70% restenosis  5. Recoil of left main and circumflex stent with now recurrent 40 to 50% stenosis. 6.  Progression of ostial left main disease now to about 60% stenosis  7. Progression of disease in jailed first marginal branch now with diffuse 90% stenosis  8.   High-grade stenosis in the mid to distal right potential femoral artery treated with 6 x 40 mm impact drug-coated balloon angioplasty to reduce the stenosis to less than 40%        HEMODYNAMICS:  [facility] 1/9/23  PA 20/9, RA 3, PCWP 8, CI 1.8     CPEST too ill   6MW 300 feet       LVAD INTERROGATION:  Device interrogated in person  Device function normal, normal flow, no events  LVAD   Pump Speed (RPM): 4800  Pump Flow (LPM): 3  PI (Pulsitility Index): 3.6  Power: 3.1   Test: No  Back Up  at Bedside & Labeled: Yes  Power Module Test: No  Driveline Site Care: No  Driveline Dressing: Clean, Dry, and Intact  Testing  Alarms Reviewed: Yes  Back up SC speed: 4800  Back up Low Speed Limit: 4400  Emergency Equipment with Patient?: Yes  Emergency procedures reviewed?: No  Drive line site inspected?: No  Drive line intergrity inspected?: Yes  Drive line dressing changed?: No    PHYSICAL EXAM:  Visit Vitals  BP (!) 96/54   Pulse 85   Temp (!) 101 °F (38.3 °C)   Resp 23   Ht 5' 6\" (1.676 m)   Wt 150 lb 12.7 oz (68.4 kg)   SpO2 98%   BMI 24.34 kg/m²     General: Patient is intubated and sedated  HEENT: Unremarkable   Neck: Supple. No evidence of thyroid enlargements or lymphadenopathy. JVD: Cannot be appreciated   Lungs: Breath sounds are equal and clear bilaterally. No wheezes, rhonchi, or rales. Heart: VAD hum, sternal wound clean and dry, chest tubes  Abdomen: No BS. Genitourinary and rectal: deferred  Extremities: No cyanosis, clubbing, or edema. Neurologic: sedated. Psychiatric: sedated  Skin: Warm, dry and well perfused hands, cold mottled feet     REVIEW OF SYSTEMS:  General: Unable to obtain. PAST MEDICAL HISTORY:  Past Medical History:   Diagnosis Date    CAD (coronary artery disease) 11/10/2016    NSTEMI & 2 stents    Deafness 10/28/2012    DM (diabetes mellitus) ([facility] [address]     Elevated cholesterol     Hypertension     NSTEMI (non-ST elevated myocardial infarction) ([facility] [address] 11/10/2016       PAST SURGICAL HISTORY:  Past Surgical History:   Procedure Laterality Date    COLONOSCOPY N/A 6/28/2018    COLONOSCOPY performed by Andrei Willis MD at [facility] ENDOSCOPY    COLONOSCOPY N/A 1/18/2023    COLONOSCOPY performed by Latoya Latham MD at [facility] ENDOSCOPY    [address]  11/11/2016    2 stents       FAMILY HISTORY:  Family History   Problem Relation Age of Onset    Heart Disease Father     Heart Attack Father     Hypertension Mother     Elevated Lipids Brother     Elevated Lipids Brother     No Known Problems Sister     Elevated Lipids Brother     No Known Problems Son     No Known Problems Daughter     Anesth Problems Neg Hx        SOCIAL HISTORY:  Social History     Socioeconomic History    Marital status:    Tobacco Use    Smoking status: Never     Passive exposure: Never    Smokeless tobacco: Never   Vaping Use    Vaping Use: Never used   Substance and Sexual Activity    Alcohol use:  Yes     Alcohol/week: 2.0 standard drinks     Types: 1 Cans of beer, 1 Shots of liquor per week     Comment: rarely    Drug use: No    Sexual activity: Yes     Social Determinants of Health     Financial Resource Strain: Medium Risk    Difficulty of Paying Living Expenses: Somewhat hard   Food Insecurity: Food Insecurity Present    Worried About [address] [facility] in the Last Year: Never true    Ran Out of Food in the Last Year: Often true       LABORATORY RESULTS:     Labs Latest Ref Rng & Units 2/17/2023 2/16/2023 2/16/2023 2/15/2023 2/15/2023 2/15/2023 2/15/2023   WBC 4.1 - 11.1 K/uL 13. 1(H) - 11. 8(H) 18. 3(H) - 20. 4(H) -   RBC 4.10 - 5.70 M/uL 4.31 - 4.61 4.62 - 5.03 -   Hemoglobin 12.1 - 17.0 g/dL 11. 5(L) - 12.3 12.5 - 13.6 -   Hematocrit 36.6 - 50.3 % 37.9 - 39.8 39.3 - 43.6 -   MCV 80.0 - 99.0 FL 87.9 - 86.3 85.1 - 86.7 -   Platelets 862 - 412 K/uL 112(L) - 143(L) 152 - 164 -   Lymphocytes 12 - 49 % - - 8(L) - - 6(L) -   Monocytes 5 - 13 % - - 6 - - 8 -   Eosinophils 0 - 7 % - - 0 - - 0 -   Basophils 0 - 1 % - - 1 - - 0 -   Albumin 3.5 - 5.0 g/dL 3. 3(L) - 3.8 - 3.4(L) 2. 9(L) 3.4(L)   Calcium 8.5 - 10.1 MG/DL 9.1 8.9 8.8 - 8. 3(L) 8.9 9.5   Glucose 65 - 100 mg/dL 93 159(H) 214(H) - 191(H) 139(H) 125(H)   BUN 6 - 20 MG/DL 38(H) 35(H) 30(H) - 28(H) 25(H) 31(H)   Creatinine 0.70 - 1.30 MG/DL 2.14(H) 1.96(H) 1.81(H) - 1.58(H) 1.37(H) 1.67(H)   Sodium 136 - 145 mmol/L 143 142 142 - 142 140 136   Potassium 3.5 - 5.1 mmol/L 4.5 4.5 4.1 - 4.2 4.4 4.8   TSH 0.36 - 3.74 uIU/mL - - - - - - -   PSA 0.01 - 4.0 ng/mL - - - - - - -   LDH 85 - 241 U/L 696(H) - 399(H) - - 442(H) -   Some recent data might be hidden     Lab Results   Component Value Date/Time    TSH 3.52 01/28/2023 05:26 AM    TSH 2.12 12/27/2022 02:36 PM    TSH 4.80 (H) 12/06/2022 03:53 AM    TSH 5.39 (H) 10/12/2022 09:10 AM    TSH 3.53 02/03/2022 11:47 AM    TSH 5.790 (H) 11/21/2019 04:45 PM    TSH 3.08 06/22/2018 01:53 PM    TSH 4.250 05/26/2015 09:43 AM       ALLERGY:  Allergies   Allergen Reactions    Ambien [Zolpidem] Other (comments)     Causes extreme confusion        CURRENT MEDICATIONS:    Current Facility-Administered Medications:     acetaminophen (TYLENOL) solution 650 mg, 650 mg, Oral, Q4H PRN, Linh Esqueda MD, 650 mg at 02/17/23 0942    HYDROmorphone (DILAUDID) injection 0.5 mg, 0.5 mg, IntraVENous, Q3H PRN, Misty MOLINA MD, 0.5 mg at 02/16/23 1603    HYDROmorphone (DILAUDID) injection 1 mg, 1 mg, IntraVENous, Q4H PRN, Misty MOLINA MD, 1 mg at 02/17/23 0433    heparin 25,000 units in D5W 250 ml infusion, 12-25 Units/kg/hr, IntraVENous, TITRATE, Bobbette Sicard, MD, Last Rate: 4.7 mL/hr at 02/17/23 0823, 7 Units/kg/hr at 02/17/23 0823    milrinone (PRIMACOR) 20 MG/100 ML D5W infusion, 0-0.75 mcg/kg/min, IntraVENous, TITRATE, Bobbette Sicard, MD, Stopped at 02/16/23 2000    albumin human 25% (BUMINATE) solution 12.5 g, 12.5 g, IntraVENous, Q6H, Bobbette Sicard, MD, 12.5 g at 02/17/23 0204    Warfarin - MD/NP dosing, , Other, Rx Dosing/Monitoring, Bobbette Sicard, MD    bumetanide [facility]) injection 0.5 mg, 0.5 mg, IntraVENous, Q4H PRN, Bobbette Sicard, MD, 0.5 mg at 02/17/23 0424    nitroGLYcerin (Tridil) 200 mcg/ml infusion, 0-20 mcg/min, IntraVENous, TITRATE, Shaila, Erin B, NP, Held at 02/16/23 0000    insulin regular (MYXREDLIN, NOVOLIN, HUMULIN) 100 units/100 ml NS infusion (premix), 0-50 Units/hr, IntraVENous, TITRATE, Shaila Lizette B, NP, Last Rate: 0.3 mL/hr at 02/17/23 0247, 0.3 Units/hr at 02/17/23 0247    glucose chewable tablet 16 g, 4 Tablet, Oral, PRN, Shaila, Lizette B, NP    glucagon (GLUCAGEN) injection 1 mg, 1 mg, IntraMUSCular, PRN, Shaila, Lizette B, NP    insulin lispro (HUMALOG) injection, , SubCUTAneous, TIDAC, Shaila, Lizette B, NP    insulin lispro (HUMALOG) injection, , SubCUTAneous, AC&HS, Shaila, Lizette B, NP    sodium chloride (NS) flush 5-40 mL, 5-40 mL, IntraVENous, Q8H, Shaila, Lizette B, NP, 10 mL at 02/17/23 0509    sodium chloride (NS) flush 5-40 mL, 5-40 mL, IntraVENous, PRN, Shaila, Lizette B, NP    naloxone (NARCAN) injection 0.4 mg, 0.4 mg, IntraVENous, PRN, Shaila, Lizette B, NP    mupirocin (BACTROBAN) 2 % ointment, , Both Nostrils, BID, Shaila, Lizette B, NP, Given at 02/16/23 1721    rifAMPin (RIFADIN) 600 mg in 0.9% sodium chloride 100 mL IVPB, 600 mg, IntraVENous, DAILY, Shaila, Lizette B, NP, Last Rate: 200 mL/hr at 02/16/23 0906, 600 mg at 02/16/23 5047    amiodarone (CORDARONE) tablet 400 mg, 400 mg, Oral, Q12H, Shaila, Lizette B, NP    ELECTROLYTE REPLACEMENT NOTE: Nurse to review Serum Potassium and Magnesuim levels and Initiate Electrolyte Replacement Protocol as needed, 1 Each, Other, PRN, Shaila, Lizette B, NP    dextrose 10 % infusion 0-250 mL, 0-250 mL, IntraVENous, PRN, Shaila, Lizette B, NP, Last Rate: 200 mL/hr at 02/17/23 0413, 50 mL at 02/17/23 0413    acetaminophen (TYLENOL) tablet 650 mg, 650 mg, Oral, Q6H PRN, Shaila, Lizette B, NP, 650 mg at 02/17/23 0200    oxyCODONE IR (ROXICODONE) tablet 10 mg, 10 mg, Oral, Q4H PRN, Shaila, Lizette B, NP    morphine injection 2 mg, 2 mg, IntraVENous, Q4H PRN, Shaila, Lizette B, NP    [Held by provider] atorvastatin (LIPITOR) tablet 10 mg, 10 mg, Oral, QPM, Shaila, Lizette B, NP    ondansetron (ZOFRAN) injection 4 mg, 4 mg, IntraVENous, Q4H PRN, Shaila, Lizette B, NP    albuterol-ipratropium (DUO-NEB) 2.5 MG-0.5 MG/3 ML, 3 mL, Nebulization, Q6H PRN, Shaila, Lizette B, NP    aspirin chewable tablet 81 mg, 81 mg, Oral, DAILY, Shaila, Lizette B, NP, 81 mg at 02/17/23 0942    chlorhexidine (PERIDEX) 0.12 % mouthwash 10 mL, 10 mL, Oral, BID, Shaila, Lizette B, NP, 10 mL at 02/16/23 1721    senna-docusate (PERICOLACE) 8.6-50 mg per tablet 1 Tablet, 1 Tablet, Oral, DAILY, Shaila, Lizette B, NP, 1 Tablet at 02/16/23 0907    polyethylene glycol (MIRALAX) packet 17 g, 17 g, Oral, DAILY, Shaila, Lizette B, NP, 17 g at 02/16/23 0906    bisacodyL (DULCOLAX) suppository 10 mg, 10 mg, Rectal, DAILY PRN, Shaila, Lizette B, NP    sucralfate (CARAFATE) tablet 1 g, 1 g, Oral, AC&HS, Lalo Lintonin B, NP    propofol (DIPRIVAN) 10 mg/mL infusion, 0-50 mcg/kg/min, IntraVENous, TITRATE, Lalo Lintonin B, NP, Last Rate: 6.8 mL/hr at 02/17/23 0837, 20 mcg/kg/min at 02/17/23 0837    0.45% sodium chloride infusion, 10 mL/hr, IntraVENous, CONTINUOUS, Reuben Bethea MD, Last Rate: 10 mL/hr at 02/17/23 0200, 10 mL/hr at 02/17/23 0200    niCARdipine (CARDENE) 25 mg in 0.9% sodium chloride 250 mL ([id number]), 0-15 mg/hr, IntraVENous, TITRATE, Keyana MOLINA MD, Last Rate: 15 mL/hr at 02/17/23 0945, 1.5 mg/hr at 02/17/23 0945    DOBUTamine (DOBUTREX) 500 mg/250 mL (2,000 mcg/mL) infusion, 0-10 mcg/kg/min, IntraVENous, TITRATE, Alyssa Johnson MD, Last Rate: 8.3 mL/hr at 02/17/23 0823, 5 mcg/kg/min at 02/17/23 0823    dexmedeTOMidine in 0.9 % NaCl (PRECEDEX) 400 mcg/100 mL (4 mcg/mL) infusion soln, 0.1-1.5 mcg/kg/hr, IntraVENous, TITRATE, Alyssa Johnosn MD, Last Rate: 9.9 mL/hr at 02/17/23 0357, 0.7 mcg/kg/hr at 02/17/23 0357    0.9% sodium chloride infusion, 9 mL/hr, IntraVENous, CONTINUOUS, Alyssa Johnson MD, Last Rate: 9 mL/hr at 02/17/23 0822, 9 mL/hr at 02/17/23 8200    amiodarone (CORDARONE) 900 mg/250 ml D5W infusion, 0.5-1 mg/min, IntraVENous, TITRATE, Lizette Linton, NP    EPINEPHrine (ADRENALIN) 10 mg in 0.9% sodium chloride 250 mL infusion, 0-10 mcg/min, IntraVENous, TITRATE, Shaila, Lizette B, NP, Last Rate: 12 mL/hr at 02/17/23 0824, 8 mcg/min at 02/17/23 0824    NOREPINephrine (LEVOPHED) 32,000 mcg in dextrose 5% 250 mL (128 mcg/mL) infusion, 0.5-16 mcg/min, IntraVENous, TITRATE, Shaila, Lizette B, NP, Last Rate: 0.469 mL/hr at 02/15/23 1344, 1 mcg/min at 02/15/23 1344    PHENYLephrine (JONELLE-SYNEPHRINE) 100 mg in 0.9% sodium chloride 250 mL infusion,  mcg/min, IntraVENous, TITRATE, Shaila, Lizette B, NP    vasopressin (VASOSTRICT) 20 Units in 0.9% sodium chloride 100 mL infusion, 0-0.04 Units/min, IntraVENous, TITRATE, Lizette Linton, NP    PATIENT CARE TEAM:  Patient Care Team:  Saman Salinas MD as PCP - General (Family Medicine)  Saman Salinas MD as PCP - [facility]  Orlin Jasso MD (Cardiovascular Disease Physician)  Claudia Mullen MD (Gastroenterology)  Kai Vazquez MD (Cardiothoracic Surgery)  Manpreet Santamaria MD (Cardiovascular Disease Physician)  Darnell Valdivia MD (Nephrology)  Joaquim Chauhan MD (Pulmonary Disease)  Catrachita Santos MD ([address] [place] [address] Vascular Surgery)     Thank you for allowing me to participate in this patient's care.     Marilee Burleson MD   [address] [place] [address], [address]  Phone: [phone]    Critically ill  Critical care 45 min

## 2023-02-17 NOTE — PROGRESS NOTES
0800: Assessment done after care assumed  Dr. Shelbi Calix here after Dr. Nini Gao and Solo Acuña were here. Orders to wean nitric and try to extubate pt.     1000: sedation being weaned off. Nitric being weaned slowly. Manual CO bolus mode done to compare to Continuous  catheter values    1058 Propofol off. Titration of   Precedex gtt as tolerated    1150: Nitric off now     1230 ABG's done vent changes per Dr. Sarita Barrientos Pt now on spontaneous with 60% PS 10    1520 family here very tearful update given Nini Gao decreased PS to 5 . Pt medicated for pain. Dobutamine now at 7 MCG for 30  minutes. CI 1.9 SVR 1333  1650 Extubated to CPAP 12/2 50%  1720 Medicated for pain Cardene stopped   1730 Precedex drip stopped. 1800: Family updated by nurse. 1930: Bedside and Verbal shift change report given to [address] [place] [address] Yuki  (oncoming nurse) by Yunior Hinojosa RN (offgoing nurse). Report included the following information OR Summary, Procedure Summary, MAR, Recent Results, Med Rec Status, and Cardiac Rhythm V PACED .

## 2023-02-17 NOTE — PROGRESS NOTES
Transitions of Care Plan  RUR: 24% - high  Clinical Update: LVAD - post op day 2  Consults: Multiple  Baseline: ambulates w RW; resides w wife  Barrier(s) to Discharge: medical  Disposition:   Home Health: [address]  DME: Ric Alvares - will need to return  Estimated Discharge Date: 2+ days    Clinical update per chart review and/or patient discussed during Interdisciplinary Rounds:    Patient is not medically stable for discharge due to ongoing medical needs. CM continues to follow treatment plan for medical progress and disposition needs.     Disposition:  Home Health vs Acute Rehab    Manuel Renteria, MPH  Care Manager [facility]  Available via [facility] or

## 2023-02-17 NOTE — PROGRESS NOTES
RENAL  PROGRESS NOTE        Subjective:   Got 1 dose of Bumex earlier for high CVP. Fair urine output  Remains febrile    Objective:   VITALS SIGNS:    Visit Vitals  BP (!) 96/54   Pulse 85   Temp (!) 102.2 °F (39 °C)   Resp 23   Ht 5' 6\" (1.676 m)   Wt 68.4 kg (150 lb 12.7 oz)   SpO2 98%   BMI 24.34 kg/m²       O2 Device: Endotracheal tube, Ventilator   O2 Flow Rate (L/min): 4 l/min   Temp (24hrs), Av.7 °F (38.7 °C), Min:100.8 °F (38.2 °C), Max:102.4 °F (39.1 °C)         PHYSICAL EXAM:  NAD  +ETT  +LVAD hum  Trace ankle edema  Vasquez  sedated    DATA REVIEW:     INTAKE / OUTPUT:   Last shift:      No intake/output data recorded. Last 3 shifts: 02/15 1901 -  0700  In: 4523.1 [I.V.:4393.1]  Out: 2550 [Urine:1570; Drains:980]    Intake/Output Summary (Last 24 hours) at 2023 0932  Last data filed at 2023 0700  Gross per 24 hour   Intake 1989.82 ml   Output 1478 ml   Net 511.82 ml           LABS:   Recent Labs     23  0358 23  0155 02/15/23  1849   WBC 13.1* 11.8* 18.3*   HGB 11.5* 12.3 12.5   HCT 37.9 39.8 39.3   * 143* 152       Recent Labs     23  0358 23  0146 23  1933 23  0155 02/15/23  1846 02/15/23  1444 02/15/23  0310     --  142 142 142   < > 136   K 4.5  --  4.5 4.1 4.2   < > 4.8   *  --  114* 112* 111*   < > 102   CO2 18*  --  18* 18* 20*   < > 25   GLU 93  --  159* 214* 191*   < > 125*   BUN 38*  --  35* 30* 28*   < > 31*   CREA 2.14*  --  1.96* 1.81* 1.58*   < > 1.67*   CA 9.1  --  8.9 8.8 8.3*   < > 9.5   MG 3.1*  --  2.7* 2.7* 2.7*   < > 2.5*   PHOS  --   --   --   --   --   --  3.8   ALB 3.3*  --   --  3.8 3.4*   < > 3.4*   TBILI 5.9*  --   --  3.4* 4.8*   < > 1.0   ALT 57  --   --  23 23   < > 24   INR  --  1.6* 1.4* 1.4* 1.3*   < >  --     < > = values in this interval not displayed. Assessment:  TANNER on CKD-3a: Suspect cardiorenal effects with needed diuresis. Now has LVAD and need to watch for POST OP ATN.  Cr upto 1.8 to 2.14 today. non oliguric     Ischemic CM: Recurrent exacerbations. EF 20%. S/p LVAD on 2/15    BPH     Well differentiated NET Grade 1: noted on EGD 1/18/23     Anemia 2 to CKD: improved    High Mg      Left UE basilic and radial vein thrombus    TANNER had resolved. Now bumped to 1.8  to 2. 14. non oliguric. Lytes OK. Plan/Recommendations:  PRN Bumex for CVP >15  Pressors as needed  Vent wean attempt later today  I&Os  Daily labs  Avoid nephrotoxins  Avoid magnesium containing supplements or laxatives    Discussed with RN    ADDENDUM:  ([id number]) - called by intensivist. Request to start CVVHD. They will place catheter. MIMI Tovar. Orders entered.

## 2023-02-17 NOTE — PROGRESS NOTES
Physical Therapy  2/17/2023    Chart reviewed. Patient remains inappropriate for OT evaluation at this time. Will follow. Thank you.     Naty Dumont, OTEDILBERTO, OTR/L

## 2023-02-17 NOTE — DIABETES MGMT
[facility]  DIABETES MANAGEMENT CONSULT    Consulted by  Dmitriy Crowder MD   for advanced nursing evaluation and care for inpatient blood glucose management. Evaluation and Action Plan   Jem Dillard is a 71year old gentleman, with Type 2 Diabetes with a recent A1C of 7.7%, who is admitted with arrhythmias and acute on chronic HFrEF with failure to respond to inotrope support. He was discharged one week prior from a prolonged hospital stay for acute on chronic HF and LVAD work-up and discharged home on dobutamine with a lifevest.  Glucose control was tenuous during his previous admission s/t multiple co-morbidities, several tests/procedures requiring NPO status, waxing and waining oral intake. At this time glucose is impacted by:  Heart Failure with reduced EF 25-30%, LVAD implant  Vasopressor use  TANNER: GFR improving: Now 32  Oral intake     This admission, he required low basal doses but high bolus doses with meals to offset pre-prandial hyperglycemia. An insulin gtt has been used for glycemic control post-operatively and insulin rates have been erratic over the last 24h. Please continue insulin gtt until vasopressors wean off and insulin doses stabilize. Action Plan    Continue insulin gtt    If extubated and oral intake resumes: Diet advancement per primary team.  When advanced, please include consistent carbohydrate component of diet (60 grams CHO/meal)- this can be added to clear and full liq diets. Please do give 1 unit humalog for every 10g CHO with oral intake. Initial Presentation   Jem Dillard is a 71 y.o. male who presented to the ED 1/26/23 with lower extremity swelling and difficulty breathing. He had a prolonged hospital admission from 12/22/22-1/19/23 for acute on chronic systolic HF and LVAD work-up. He was discharged with home inotrope.    LAB: , GFR 58, Trop 2003, BNP 4524  CXR: New diffuse bilateral increased interstitial markings, partially obscuring bilateral pulmonary nodules. HX:   Past Medical History:   Diagnosis Date    CAD (coronary artery disease) 11/10/2016    NSTEMI & 2 stents    Deafness 10/28/2012    DM [facility]diabetes mellitus) [facility]     Elevated cholesterol     Hypertension     NSTEMI (non-ST elevated myocardial infarction) ([facility] 11/10/2016        INITIAL DX:   CHF (congestive heart failure) ([facility] [I50.9]     Current Treatment     TX: Milrinone, Amio. Specialty consultations: [facility], Cardiology, EP, GI     Hospital Course   Clinical progress has been complicated by:    1/27: Admission. Rapid response for SVT- Given adenosine x2, amio bolus/gtt  1/27: Advanced HFC consult. EP consult ordered. Intolerance of inotropic support. Cardiology consult. NSTEMI, heparin gtt started. Seen by EP: Continue amio. 1/28: Febrile: CT chest 1/28 shows diffuse focal opacities concerning for pneumonia. Obtain blood cultures, UA. Pathology report 1/18/23: well differentiated neuroendocrine tumor grade 1. EGD: Patchy erythema gastric body, biopsies done. 6 mm sessile polyp gastric body biopsied  1/30: Oncology consult: Recommend multiphase CT of the abdomen and pelvis to evaluate for metastatic spread. Tachycardia and SOB, BiPAP overnight. 2/3: Accepted for VAD implant if renal fx improves per [facility]. CCU Transfer and swan placed  2/4: PRBC transfusion   2/6: With increased dyspnea.   Doubtamine started  2/15: LVAD Implant  Diabetes History   Type 2 Diabetes  Ambulatory BG management provided by: PCP Pedrito Parekh MD    Diabetes-related Medical History  Acute complications  Acute hyperglycemia  Neurological complications  Peripheral neuropathy  Microvascular disease  Nephropathy  Macrovascular disease  CAD  Other associated conditions                                       CHF     Diabetes Medication History  Key Antihyperglycemic Medications        Diabetes Medication History  Key Antihyperglycemic Medications               metFORMIN (GLUCOPHAGE) 500 mg tablet (Taking) Take 1 Tablet by mouth two (2) times daily (with meals) for 30 days. glipiZIDE (GLUCOTROL) 5 mg tablet (Taking) Take 1 tablet by mouth twice daily    Januvia 50 mg tablet (Taking) Take 1 tablet by mouth once daily             Diabetes self-management practices:   Eating pattern                Eats 3 small meals daily  [x]         Breakfast                         2 Eggs, Coffee  [x]         Lunch                               [place]  [x]         Dinner                              \"[place] Food\" Bread, rice, lentils   [x]         Bedtime                           [place]  [x]         Snacks                              Afternoon snack  [x]         Beverages                        Water, Coffee  Physical activity pattern                Sedentary   Monitoring pattern  Discharged last week with a [place] CGM and serum glucometer  7 day average Ba-9a: 129-164  9a-12: 243  Noon to 9p: 198-238  1 low BG in the last week overnight    Taking medications pattern  [x]         Consistent administration  [x]         Affordable  Social determinants of health impacting diabetes self-management practices   Concerned that you need to know more about how to stay healthy with diabetes  Overall evaluation:                 [x]         Achieving A1c target with drug therapy & self-care practices    Subjective     Objective   Physical exam  General Underweight [place] [facility] male in critical condition in CVICU. Intubated  Neuro  Sedated   Vital Signs Visit Vitals  BP (!) 96/54   Pulse 86   Temp (!) 100.9 °F (38.3 °C)   Resp 23   Ht 5' 6\" (1.676 m)   Wt 68.4 kg (150 lb 12.7 oz)   SpO2 98%   BMI 24.34 kg/m²     Skin  Warm and dry. No acanthosis noted along neckline. Sternal surgical incision. Chest tubes. LVAD  Heart   Regular rate and rhythm.  No murmurs, rubs or gallops  Lungs  Clear to auscultation without rales or rhonchi  Extremities No foot wounds        Laboratory  Recent Labs     23  8936 02/16/23  1933 02/16/23  0155 02/15/23  1849 02/15/23  1846   GLU 93 159* 214*  --  191*   AGAP 9 10 12  --  11   WBC 13.1*  --  11.8* 18.3*  --    CREA 2.14* 1.96* 1.81*  --  1.58*   *  --  136*  --  82*   ALT 57  --  23  --  23         Factors impacting BG management  Factor Dose Comments   Nutrition:  Standard meals     NPO      Heart Failure/NSTEMI/Ischemic cardiomyopathy/Arrhythmias  HeartMate 3 LVAD  Dobutamine   Epinephrine     Infection UTI/PNA  Urine Cx pending   IV antibiotics   Fevers    Other:   Kidney function TANNER on CKD:   Current GFR 32      Blood glucose pattern    Significant diabetes-related events over the past 24-72 hours  A1C 7.7% 1/11/23  Pre-op: Basal: Lantus 6 units daily and Bolus: 10 units Humalog/meal  Insulin gtt:   2/15: 2.8 units  2/16: 32.8 units  2/17: 4.1 units since MN      Assessment and Nursing Intervention   Nursing Diagnosis Risk for unstable blood glucose pattern   Nursing Intervention Domain 5250 Decision-making Support   Nursing Interventions Examined current inpatient diabetes/blood glucose control   Explored factors facilitating and impeding inpatient management  Explored corrective strategies with patient and responsible inpatient provider   Informed patient of rational for insulin strategy while hospitalized     Billing Code(s)   90048    Before making these care recommendations, I personally reviewed the hospitalization record, including notes, laboratory & diagnostic data and current medications, and examined the patient at the bedside (circumstances permitting) before determining care. More than fifty (50) percent of the time was spent in patient counseling and/or care coordination.   Total minutes: 25    SLICK Li  Diabetes Clinical Nurse Specialist  [facility]  Access via [employer]

## 2023-02-17 NOTE — PROGRESS NOTES
ADVANCED HEART FAILURE NOTE    Called unit for update, d/w bedside staff.     Tm 100.9, MAPs 58-60s, HR 80s paced, CVP 16, PA 23/14/17, SVR 1100s, CI 1.8-2.2 on dobutamine 5, epi 8, milrinone 0.2, just weaned off nitric    Resume Angella 1ppm, if not improvement in hemodynamics, decrease milrinone 0.125 mcg/kg/min and give bumex 0.5mg IV q4h prn CVP>14  Check BMP, INR, lactic    Brittanie Blood MD  F Cardiology

## 2023-02-17 NOTE — PROGRESS NOTES
02/17/23 1520   Weaning Parameters   Spontaneous Breathing Trial Complete Yes   Resp Rate Observed 25   Ve 7.9      RSBI 79       1650: Patient extubated to Bipap per MD order. MD/RN at bedside.

## 2023-02-17 NOTE — PROGRESS NOTES
Comprehensive Nutrition Assessment    Type and Reason for Visit: Reassess    Nutrition Recommendations/Plan:     Advance to regular diet, NCS with no salt packets on tray as medically feasible s/p extubation. Please avoid additional diet restrictions (other than texture if indicated) given known poor PO intake with them in place. When diet advanced, please resume Glucerna shakes BID    IF tube feeds needed (extubated or not) - recommend DHT placement with intermittent TF of Nepro, 237 mL infused over 1 hour QID. Flush with 60 mL H2O after each feed. Feeds would need to be timed around Carafate (TF should not be given within 1 hour before/ after Carafate administration). If Carafate still being held, could run Nepro @ 45 mL/hr continuously. Malnutrition Assessment:  Malnutrition Status: Moderate malnutrition (01/30/23 1232)    Context:  Acute illness     Findings of the 6 clinical characteristics of malnutrition:   Energy Intake:  75% or less of est energy req for 7 or more days  Weight Loss:  5% over one month     Body Fat Loss:  Mild body fat loss, Buccal region   Muscle Mass Loss:  Mild muscle mass loss, Clavicles (pectoralis & deltoids), Thigh (quadriceps)  Fluid Accumulation:  Mild, Extremities   Strength:  Not performed        Nutrition Assessment:    PMHx: CAD s/p PCI x 2, HFrEF with EF 25-30%, DM, HTN, hypercholesterolemia, Nstemi, CKD stage III. Admitted 1/26 d/t ongoing symptoms r/t his HFrEF and LVAD work-up. Tx to CVICU on 2/3 for [facility] placement and ongoing LVAD workup. Noted, as part of LVAD work-up PTA, pt underwent EGD with bx on 1/18/23 which path revealed well-differentiated neuroendocrine tumor. Oncology consulted, no absolute contraindications to LVAD. Nephrology following for TANNER on CKD 3. LVAD placed 2/15. 2/17: pt had LVAD placed 2/15 and remains intubated. Spoke with RN yesterday and again today - no plans to start TF, working on extubation now.   RN states OG is too small for tube feeds anyway and worries it would clog, PO meds held. Pt would need a DHT. Even if pt extubated, will be a slow progression of diet over weekend. DHT may need to be considered, but also with Carafate QID, tube feeds would need to be held at various points throughout the day which makes regimen more complicated, so would consider intermittent vs nocturnal feeds if needed. PO intake was improving pre-op, but overall still with significant wt loss and meeting moderate malnutrition status prior to surgery. Weight up as expected post-op (d/t fluid). Currently 150 lb, but was 123 lb on 2/13 standing scale when last seen by this service. Ve Observed 8.68 l/min, tMax:102.4 °F  [place] EEN ~1800 kcal, which is similar to current EEN. Therefore, will not adjust given likelihood of extubation soon. If TF needed, recommend 4 cartons of Nepro - giving 1 carton over 1 hour QID that are timed around the Carafate. 4 cartons of Nepro daily with 60 mL H2O flushes 4x/day provide 948 mL, 1706 kcal, 77 gm pro, 153 gm CHO, and 692+921=914 mL free H2O.      2/13: follow-up. Pt visited at bedside, wife present. Appetite and intake better from last week. Wife bringing in [de-identified] of food. Today she brought egg salad sandwich. Pt states the hard boiled eggs worked well, but now he is tired of these and would prefer a bowl of Raisin Bran with 2% milk. Pt has Ensure Plus HP at bedside in strawberry flavor - states he isn't drinking, but he believes this is what is usually delivered. He was unaware of the Glucerna or Nepro shakes, however, wife confirms he gets the Glucerna at lunch. She is not here for dinner, so is unsure. I showed them both a picture and plan is for him to actually try tonight at dinner (higher in kcal and still CHO steady) and if he likes, continue with dinner. If he doesn't like, will send Glucerna with dinner. But overall, 3 ONS/day is too much.   So will d/c Ensure HP with breakfast and change to Glucerna with breakfast.  Pt will not receive ONS at lunch. He likes milk and usually drinks with this meal instead. We discussed typical decreased appetite post-op. Pt in good spirits and overall looks better from a nutrition standpoint today. Note height change to 66\" per wife's report vs previously listed height was 61\" ('s license lists 2'6\"), which helps improve BMI. Pt and wife both confirm today. Overall, wt down from last week, but up from admission. Will be very difficult to assess/ track actual weight gain d/t diuresis. Will remove from goals at this time. 2/6: follow-up and new consult for poor appetite/ intake. Noted weight up from admission (122 lb ---> now 127 lb), both standing scale. Difficult to assess true wt gain vs fluid. UBW prior to December admissions was 135 lb, pt states he has never been higher than 137 lb. Looking back at pt's last 48 wt records in our system (which takes us to 2018) pt's highest consistent wt was 141 lb, but overall wt hx is fairly consistent with report. Wife present. Has been bringing food from outside. She states it is mostly food she prepares and doesn't add salt. However, she brought him Cookout today, so staff concerned with salt content. Wife aware he should be on a low Na+ diet, but states he was told he had no restrictions before since his intake was so poor and he was losing weight. Discussed current low K+ restriction and she states his kidney fx has not been good. She said she tried to get him a grilled cheese earlier this admission and was told he couldn't have this. She asked if she should stop bringing food from outside, perhaps he would be hungrier for more of the hospital food. We all discussed this as an option, but given his poor nutritional status overall and fact he is picky eater, this could result in him not eating meal at all. I encouraged more homemade food.   Pt likes the Ensure - only vanilla and requesting strawberry, which we do not have in a lower CHO option. He was also open to trying Nepro. For now, will remove K+ restriction, but if K+ starts to trend back up, should be resumed. Pt specially states he doesn't like our scrambled eggs and has asked for hard boiled, but doesn't receive. No other food restrictions/ preferences given. He will eat pork/ beef, but not in ways that we usually prepare. For example, will eat a burger, but won't eat the beef stroganoff. Will eat soriano, but not a pork chop. Would eat fish over chicken most of the time. 1/30: initial. PU MST received. WOCN following for DTI to right heel. Familiar with pt from previous admission. Spoke with pt at bedside. Pt recently discharged. He reports good PO intakes for a while when he first went home but his intakes have decreased for a few days. Intakes since admission are documented as 26-75% meals. Breakfast tray observed in room, he ate 25% eggs, sausage. Discussed ONS preferences. Pt states he has been drinking Premier Protein shakes at home, is agreeable to receive Ensure High Protein here, prefers vanilla. During previous admission, pt's wife was bringing in food from home for pt. Multiple snacks on bedside table today from home. Weight hx in EMR indicates 9% weight loss over last 2 months which is significant for time frame. This is consistent with wife's reported UBW of pt as 135 lbs on 12/5.    Labs: Na+ 131, , HgbA1c 7.7      Nutritionally Significant Medications:  buminate, Bumex, SSI, Protonix, Miralax, pericolace, carafate QID  Drips: amiodarone, precedex, dobutamine, heparin, cardene, epi @ 8, insulin gtt  PRN: dulcolax, dilaudid    Estimated Daily Nutrient Needs:  Energy Requirements Based On: Kcal/kg  Weight Used for Energy Requirements: Admission (54.4 kg)  Energy (kcal/day): 1900 (35+ kcal/kg for wt gain)  Weight Used for Protein Requirements: Admission  Protein (g/day): 80 (1.5+ gm/kg)  Method Used for Fluid Requirements: 1 ml/kcal  Fluid (ml/day): 2000 (CHF)    Nutrition Related Findings:   Edema: Generalized: Pitting (2/16/2023  8:00 PM)  LLE: 2+; Pitting (2/16/2023  8:00 PM)  LUE: Non-pitting (2/16/2023  8:00 PM)  RLE: 2+; Pitting (2/16/2023  8:00 PM)  RUE: Non-pitting (2/16/2023  8:00 PM)    Last BM: 02/14/23, Formed    Wounds: Deep tissue injury, Multiple (DTI to right heel, wound to left ankle, WOCN following)      Current Nutrition Therapies:  Diet: NPO  Supplements: NPO (previously was agreeable to Glucerna @ breakfast and was trying Nepro @ dinner - vanilla only. Would love strawberry though. No chocolate)  Meal intake: Patient Vitals for the past 168 hrs:   % Diet Eaten   02/12/23 1555 76 - 100%   02/12/23 0930 76 - 100%   02/11/23 0800 26 - 50%     Supplement intake: Patient Vitals for the past 168 hrs:   Supplement intake %   02/11/23 0800 76 - 100%     Nutrition Support: none at this time      Anthropometric Measures:  Height: 5' 6\" (167.6 cm)  Ideal Body Weight (IBW): 142 lbs (65 kg)  Admission Body Weight: 120 lb  Current Body Wt:  68.4 kg (150 lb 12.7 oz), 106.2 % IBW.  Bed scale  Current BMI (kg/m2): 24.4        Weight Adjustment: No adjustment                 BMI Category: Underweight (BMI less than 22) age over 72      Last 3 Recorded Weights in this Encounter    02/15/23 0345 02/16/23 0452 02/17/23 0500   Weight: 56.7 kg (125 lb) 67.8 kg (149 lb 7.6 oz) 68.4 kg (150 lb 12.7 oz)     Last 3 Recorded Weights in this Encounter    02/11/23 0700 02/12/23 0807 02/13/23 0712   Weight: 55.5 kg (122 lb 5.7 oz) 54.7 kg (120 lb 8 oz) 56.1 kg (123 lb 10.9 oz)     Last 3 Recorded Weights in this Encounter    02/04/23 0700 02/05/23 0700 02/06/23 0700   Weight: 56.3 kg (124 lb 1.9 oz) 57.2 kg (126 lb 1.7 oz) 57.9 kg (127 lb 10.3 oz)     Wt Readings from Last 10 Encounters:   02/13/23 56.1 kg (123 lb 10.9 oz) - standing   02/06/23 57.9 kg (127 lb 10.3 oz)   01/25/23 55.8 kg (123 lb) 01/23/23 54.9 kg (121 lb)   01/21/23 54.4 kg (120 lb)   01/20/23 52.2 kg (115 lb)   01/20/23 52.2 kg (115 lb)   01/19/23 53 kg (116 lb 13.5 oz)   12/19/22 58.5 kg (129 lb)   12/16/22 57.4 kg (126 lb 8.7 oz)   12/05/22 59 kg (130 lb)           Nutrition Diagnosis:   Inadequate oral intake related to impaired respiratory function, cardiac dysfunction as evidenced by intubation, NPO or clear liquid status due to medical condition  Underweight related to inadequate protein-energy intake as evidenced by BMI    Nutrition Interventions:   Food and/or Nutrient Delivery: Continue NPO (consider tube feeds)  Nutrition Education/Counseling: Counseling initiated (2/6/23)  Coordination of Nutrition Care: Continue to monitor while inpatient       Goals:  Previous Goal Met: Progressing toward goal(s)  Goals: other (specify)  Specify Other Goals: pt will be extubated in next 24 hours with diet advancement beyond clears within 48 hours and PO intake >/=50% of meals within 5-7 days thereafter    Nutrition Monitoring and Evaluation:   Behavioral-Environmental Outcomes: None identified  Food/Nutrient Intake Outcomes: Diet advancement/tolerance, Food and nutrient intake, Supplement intake, Other (specify) (vs initiation of enteral nutrition support)  Physical Signs/Symptoms Outcomes: Biochemical data, Chewing or swallowing, GI status, Fluid status or edema, Hemodynamic status, Meal time behavior, Nutrition focused physical findings, Weight, Skin    Discharge Planning:    Continue oral nutrition supplement    Recent Labs     02/17/23  0358 02/16/23  1933 02/16/23  0155 02/15/23  1846 02/15/23  1444 02/15/23  0310   GLU 93 159* 214* 191*   < > 125*   BUN 38* 35* 30* 28*   < > 31*   CREA 2.14* 1.96* 1.81* 1.58*   < > 1.67*    142 142 142   < > 136   K 4.5 4.5 4.1 4.2   < > 4.8   * 114* 112* 111*   < > 102   CO2 18* 18* 18* 20*   < > 25   CA 9.1 8.9 8.8 8.3*   < > 9.5   PHOS  --   --   --   --   --  3.8   MG 3.1* 2.7* 2.7* 2.7* < > 2.5*    < > = values in this interval not displayed.      Recent Labs     02/17/23  1241 02/17/23  0950 02/17/23  0750 02/17/23  9263 02/17/23  0540 02/17/23  0437 02/17/23  0404 02/17/23  0240 02/17/23  0139 02/17/23  0011 02/16/23  2306 02/16/23  2140 02/16/23  2036 02/16/23  1925 02/16/23  1814 02/16/23  1613   GLUCPOC 155* 115 98 101 88 97 76 85 97 104 117 134* 129* 132* 139* 129*       Lab Results   Component Value Date/Time    Hemoglobin A1c 7.7 (H) 01/11/2023 06:14 PM    Hemoglobin A1c 6.5 (H) 12/06/2022 03:53 AM    Hemoglobin A1c 7.0 (H) 10/12/2022 09:10 AM       gAa Rubio RD  Available via [facility]

## 2023-02-17 NOTE — PROGRESS NOTES
2000 - Bedside and Verbal shift change report given to Alivia Madrigal RN (oncoming nurse) by Jason Hernandez RN (offgoing nurse). Report included the following information SBAR, Kardex, OR Summary, Procedure Summary, Intake/Output, MAR, Recent Results, and Cardiac Rhythm V paced . 18 - Dr. Andria Mendenhall paged for CI ranging from 1.3 - 2. All other hemodynamics stable at this time. No new orders at this time. 0400 - ABG drawn; 7.41 / 29 / 254 / 18. No new orders at this time. 0410 - Blood sugar 65. Per glucostabilizer and conversion chart, 50mL of D10 given at this time. 0420 - CVP increased to 18, MAP 75, flow on LVAD 2.6 - 2.7. PRN bumex given. 0715 - Low flow alarm at 2.4. Appropriate line changes made. Flows recovered at 2.9.     0800 - Bedside and Verbal shift change report given to Suzan Flowers RN (oncoming nurse) by Alivia Madrigal RN (offgoing nurse). Report included the following information SBAR, Kardex, OR Summary, Procedure Summary, Intake/Output, MAR, Recent Results, and Cardiac Rhythm V paced .

## 2023-02-17 NOTE — PROGRESS NOTES
CRITICAL CARE NOTE      Name: Bayron Carvajal   : 1950   MRN: [id number]   Date: 2023      Reason for ICU Admission: Acute on chronic HFrEF, LVAD workup     ICU PROBLEM LIST   Acute on chronic HFrEF (20%) NYHA IIIb/IV (D) on home inotropic support, life vest  TANNER on CKD3  CHB post op  Aflutter w/ RVR  Acute on chronic hypoxemic respiratory failure  CAP  CAD  Gastric neuroendocrine tumor  Hx of LUE DVT  DM  PAD  TRISTAN  BPH w/ urinary retention requiring chronic donnelly    HISTORY OF PRESENT ILLNESS:   Pt is a 71 y.o M w/ PMH as noted above admitted to [facility] on  due to ongoing symptoms secondary to his HFrEF. Treated for possible CAP, and diuresed for acute on chronic HFrEF. Nephrology following for TANNER on CKD 3. AHF team recommended transfer to CVICU for [facility] placement and ongoing LVAD workup, with placement 2/15.      24 HOUR EVENTS:   Pt unable to tolerate milrinone due to hypotension, stopped. On dobutamine and epinephrine. V paced, underlying CHB. Remains on low dose Angella, sedated on ventilator. Good UOP. Plan for awakening trial w/ Angella wean and possible extubation.     NEUROLOGICAL:    Propofol, Precedex, fentanyl gtt  Goal RASS 0 to -1  RASHAD, goal extubation  Delirium precautions  Encourage work w/ PT/OT once  extubated    PULMONOLOGY:   Lung protective ventilation  SBT, goal extubation to BiPAP  wean Angella, if unable to wean will continue through BiPAP/HFNP  Keep intubated until hemodynamics more stable  Monitor blood gas    CARDIOVASCULAR:   On epi, dobutamine,Angella  for RV  support  Weaning Angella,   Unable to tolerate milrinone  PRN bumex for CVP >16  CVP goal 10-12  Cardene PRN fo maintain SVR down, MAP <75  Lct normalized  LVAD  3L @ 4800 RPM  Monitor chest tube output  V  paced to 86 due to CHB, monitor for return of underlying rhythm  Stop amiodarone  C/w ASA, statin  Goal euvolemia  close hemodynamic monitoring, trend lct,  monitor for evidence  of hypoperfusion   Unable to tolerate BB, ARNI/ARB due to , aldactone held 2/2 elevated K     GASTROINTESTINAL:   NPO, ADAT once extubated  PPI     RENAL/ELECTROLYTE/FLUIDS:   Strict I/Os,  goal  euvolemia  Cr uptrending, good UOP  No indication for RRT at this time  Nephrology following  Monitor RFP  Mg/Phos/K >2/3/4  Vasquez to remain in place    ENDOCRINE:   Insulin gtt per protocol  Resume SSI, Basal insulin when indicated  Hypoglycemic protocol  Glucose goal 120-180    HEMATOLOGY/ONCOLOGY:   Heparin gtt, VAD protocol  EPO weekly  Gastric neuroendocrine tumor, well differentiated, good prognosis per onc. ID/MICRO:   Tana  VAD implant Vanc,  rif, levaquin, fluconazole    ICU DAILY CHECKLIST     Code Status:Full  DVT Prophylaxis:heparin  T/L/D: PAC, PIV,  Vasquez,  V  wire,  valentin  x5, LVAD drive line  SUP: PPI  Diet: NPO  Activity Level:ad jose f  ABCDEF Bundle/Checklist Completed:Yes  Disposition: Stay in ICU  Multidisciplinary Rounds Completed:  yes  Patient/Family Updated: Yes    Tatiana   2/3 Transferred to CVICU for [facility] placement  2/7 started on dobutamine as adjunct to milrinone  2/15 LVAD implant    SUBJECTIVE:     As noted above    Review of Systems:     Review of Systems   Unable to perform ROS: Intubated   Constitutional:  Negative for chills, fever and malaise/fatigue. HENT:  Negative for congestion and sinus pain. Eyes:  Negative for blurred vision, double vision and pain. Respiratory:  Negative for hemoptysis, sputum production and shortness of breath. Cardiovascular:  Negative for chest pain, palpitations and leg swelling. Gastrointestinal:  Negative for abdominal pain, nausea and vomiting. Genitourinary:  Negative for dysuria, frequency and urgency. Musculoskeletal:  Negative for back pain, joint pain and myalgias. Skin:  Negative for itching and rash. Neurological:  Negative for tremors, sensory change, speech change and focal weakness. Psychiatric/Behavioral:  Negative for hallucinations.  The patient is not nervous/anxious. OBJECTIVE:     Labs and Data: Reviewed 23  Medications: Reviewed 23  Imaging: Reviewed 23    General - sedated, intubated chronically ill appearing  HEENT- pupils equal, nystagmus, anicteric  Neuro - sedated, no focal deficit  CV - Paced,  VAD hum, post sternotomy, valentin  x5  Resp - rales b/l, NC  Abd - soft, distended, nontender, no guarding  MSK- intact, no sacral ulcer      Visit Vitals  BP (!) 96/54   Pulse 86   Temp (!) 100.9 °F (38.3 °C)   Resp 23   Ht 5' 6\" (1.676 m)   Wt 68.4 kg (150 lb 12.7 oz)   SpO2 98%   BMI 24.34 kg/m²    O2 Flow Rate (L/min): 4 l/min O2 Device: Endotracheal tube, Ventilator Temp (24hrs), Av.7 °F (38.7 °C), Min:100.9 °F (38.3 °C), Max:102.4 °F (39.1 °C)    CVP (mmHg): 18 mmHg (23 1300)      Intake/Output:     Intake/Output Summary (Last 24 hours) at 2023 1506  Last data filed at 2023 1400  Gross per 24 hour   Intake 1349.47 ml   Output 1367 ml   Net -17.53 ml         Imaging    23    ECHO ADULT FOLLOW-UP OR LIMITED 2023    Interpretation Summary    Left Ventricle: EF by visual approximation is 20%. Left ventricle is mildly dilated. Mitral Valve: Moderately thickened leaflet, at the anterior and posterior leaflets. Moderately calcified leaflet. Moderate regurgitation. Left Atrium: Left atrium is moderately dilated. Technical qualifiers: Echo study was technically difficult with poor endocardial visualization. Contrast used: Definity. Limited study, not all structures viewed    Signed by: Lon Hitchcock MD on 2023  4:39 PM       Pertinent imaging reviewed and interpreted as noted above    CRITICAL CARE DOCUMENTATION  I had a face to face encounter with the patient, reviewed and interpreted patient data including clinical events, labs, images, vital signs, I/O's, and examined patient.   I have discussed the case and the plan and management of the patient's care with the consulting services, the bedside nurses and the respiratory therapist.      NOTE OF PERSONAL INVOLVEMENT IN CARE   This patient has a high probability of imminent, clinically significant deterioration, which requires the highest level of preparedness to intervene urgently. I participated in the decision-making and personally managed or directed the management of the following life and organ supporting interventions that required my frequent assessment to treat or prevent imminent deterioration. I personally spent 60 minutes of critical care time. This is time spent at this critically ill patient's bedside actively involved in patient care as well as the coordination of care. This does not include any procedural time which has been billed separately. Walker Montelongo MD  Staff [facility]

## 2023-02-18 ENCOUNTER — ANESTHESIA EVENT (OUTPATIENT)
Dept: CARDIOTHORACIC SURGERY | Age: 73
End: 2023-02-18
Payer: MEDICARE

## 2023-02-18 ENCOUNTER — ANESTHESIA (OUTPATIENT)
Dept: CARDIOTHORACIC SURGERY | Age: 73
End: 2023-02-18
Payer: MEDICARE

## 2023-02-18 ENCOUNTER — HOSPITAL ENCOUNTER (OUTPATIENT)
Dept: NON INVASIVE DIAGNOSTICS | Age: 73
Discharge: HOME | End: 2023-02-18
Attending: THORACIC SURGERY (CARDIOTHORACIC VASCULAR SURGERY)

## 2023-02-18 LAB
ADMINISTERED INITIALS, ADMINIT: NORMAL
D50 ADMINISTERED, D50ADM: 0 ML
D50 ORDER, D50ORD: 0 ML
GLUCOSE, GLC: 101 MG/DL
GLUCOSE, GLC: 105 MG/DL
GLUCOSE, GLC: 123 MG/DL
GLUCOSE, GLC: 125 MG/DL
GLUCOSE, GLC: 129 MG/DL
GLUCOSE, GLC: 131 MG/DL
GLUCOSE, GLC: 131 MG/DL
GLUCOSE, GLC: 132 MG/DL
GLUCOSE, GLC: 137 MG/DL
GLUCOSE, GLC: 82 MG/DL
GLUCOSE, GLC: 82 MG/DL
GLUCOSE, GLC: 87 MG/DL
HIGH TARGET, HITG: 130 MG/DL
HIGH TARGET, HITG: 140 MG/DL
INSULIN ADMINSTERED, INSADM: 0 UNITS/HOUR
INSULIN ADMINSTERED, INSADM: 0.8 UNITS/HOUR
INSULIN ADMINSTERED, INSADM: 1.4 UNITS/HOUR
INSULIN ADMINSTERED, INSADM: 1.8 UNITS/HOUR
INSULIN ADMINSTERED, INSADM: 1.8 UNITS/HOUR
INSULIN ADMINSTERED, INSADM: 2.1 UNITS/HOUR
INSULIN ADMINSTERED, INSADM: 2.6 UNITS/HOUR
INSULIN ADMINSTERED, INSADM: 2.8 UNITS/HOUR
INSULIN ADMINSTERED, INSADM: 2.8 UNITS/HOUR
INSULIN ORDER, INSORD: 0 UNITS/HOUR
INSULIN ORDER, INSORD: 0.8 UNITS/HOUR
INSULIN ORDER, INSORD: 1.4 UNITS/HOUR
INSULIN ORDER, INSORD: 1.8 UNITS/HOUR
INSULIN ORDER, INSORD: 1.8 UNITS/HOUR
INSULIN ORDER, INSORD: 2.1 UNITS/HOUR
INSULIN ORDER, INSORD: 2.6 UNITS/HOUR
INSULIN ORDER, INSORD: 2.8 UNITS/HOUR
INSULIN ORDER, INSORD: 2.8 UNITS/HOUR
LOW TARGET, LOT: 100 MG/DL
LOW TARGET, LOT: 95 MG/DL
MINUTES UNTIL NEXT BG, NBG: 60 MIN
MULTIPLIER, MUL: 0
MULTIPLIER, MUL: 0.01
MULTIPLIER, MUL: 0.02
MULTIPLIER, MUL: 0.03
MULTIPLIER, MUL: 0.04
ORDER INITIALS, ORDINIT: NORMAL

## 2023-02-18 PROCEDURE — 77030026438 HC STYL ET INTUB CARD -A: Performed by: NURSE ANESTHETIST, CERTIFIED REGISTERED

## 2023-02-18 PROCEDURE — 74011250636 HC RX REV CODE- 250/636: Performed by: ANESTHESIOLOGY

## 2023-02-18 PROCEDURE — 77030008684 HC TU ET CUF COVD -B: Performed by: NURSE ANESTHETIST, CERTIFIED REGISTERED

## 2023-02-18 PROCEDURE — P9045 ALBUMIN (HUMAN), 5%, 250 ML: HCPCS | Performed by: NURSE ANESTHETIST, CERTIFIED REGISTERED

## 2023-02-18 PROCEDURE — 74011250636 HC RX REV CODE- 250/636: Performed by: NURSE ANESTHETIST, CERTIFIED REGISTERED

## 2023-02-18 PROCEDURE — 74011000250 HC RX REV CODE- 250: Performed by: NURSE ANESTHETIST, CERTIFIED REGISTERED

## 2023-02-18 RX ORDER — SODIUM CHLORIDE, SODIUM LACTATE, POTASSIUM CHLORIDE, CALCIUM CHLORIDE 600; 310; 30; 20 MG/100ML; MG/100ML; MG/100ML; MG/100ML
INJECTION, SOLUTION INTRAVENOUS
Status: DISCONTINUED | OUTPATIENT
Start: 2023-02-18 | End: 2023-02-18 | Stop reason: HOSPADM

## 2023-02-18 RX ORDER — SODIUM CHLORIDE 9 MG/ML
INJECTION, SOLUTION INTRAVENOUS
Status: DISCONTINUED | OUTPATIENT
Start: 2023-02-18 | End: 2023-02-18 | Stop reason: HOSPADM

## 2023-02-18 RX ORDER — SODIUM BICARBONATE 1 MEQ/ML
SYRINGE (ML) INTRAVENOUS AS NEEDED
Status: DISCONTINUED | OUTPATIENT
Start: 2023-02-18 | End: 2023-02-18 | Stop reason: HOSPADM

## 2023-02-18 RX ORDER — PROPOFOL 10 MG/ML
INJECTION, EMULSION INTRAVENOUS
Status: DISCONTINUED | OUTPATIENT
Start: 2023-02-18 | End: 2023-02-18 | Stop reason: HOSPADM

## 2023-02-18 RX ORDER — ALBUMIN HUMAN 50 G/1000ML
SOLUTION INTRAVENOUS AS NEEDED
Status: DISCONTINUED | OUTPATIENT
Start: 2023-02-18 | End: 2023-02-18 | Stop reason: HOSPADM

## 2023-02-18 RX ORDER — PHENYLEPHRINE HCL IN 0.9% NACL 0.4MG/10ML
SYRINGE (ML) INTRAVENOUS AS NEEDED
Status: DISCONTINUED | OUTPATIENT
Start: 2023-02-18 | End: 2023-02-18 | Stop reason: HOSPADM

## 2023-02-18 RX ORDER — FENTANYL CITRATE 50 UG/ML
INJECTION, SOLUTION INTRAMUSCULAR; INTRAVENOUS AS NEEDED
Status: DISCONTINUED | OUTPATIENT
Start: 2023-02-18 | End: 2023-02-18 | Stop reason: HOSPADM

## 2023-02-18 RX ORDER — ROCURONIUM BROMIDE 10 MG/ML
INJECTION, SOLUTION INTRAVENOUS AS NEEDED
Status: DISCONTINUED | OUTPATIENT
Start: 2023-02-18 | End: 2023-02-18 | Stop reason: HOSPADM

## 2023-02-18 RX ORDER — PROPOFOL 10 MG/ML
INJECTION, EMULSION INTRAVENOUS AS NEEDED
Status: DISCONTINUED | OUTPATIENT
Start: 2023-02-18 | End: 2023-02-18 | Stop reason: HOSPADM

## 2023-02-18 RX ORDER — LIDOCAINE HYDROCHLORIDE 20 MG/ML
INJECTION, SOLUTION EPIDURAL; INFILTRATION; INTRACAUDAL; PERINEURAL AS NEEDED
Status: DISCONTINUED | OUTPATIENT
Start: 2023-02-18 | End: 2023-02-18 | Stop reason: HOSPADM

## 2023-02-18 RX ADMIN — LIDOCAINE HYDROCHLORIDE 100 MG: 20 INJECTION, SOLUTION EPIDURAL; INFILTRATION; INTRACAUDAL; PERINEURAL at 08:18

## 2023-02-18 RX ADMIN — ALBUMIN (HUMAN) 250 ML: 12.5 INJECTION, SOLUTION INTRAVENOUS at 09:22

## 2023-02-18 RX ADMIN — PROPOFOL 30 MG: 10 INJECTION, EMULSION INTRAVENOUS at 11:18

## 2023-02-18 RX ADMIN — SODIUM BICARBONATE 100 MEQ: 84 INJECTION, SOLUTION INTRAVENOUS at 09:22

## 2023-02-18 RX ADMIN — Medication 120 MCG: at 09:19

## 2023-02-18 RX ADMIN — ALBUMIN (HUMAN) 250 ML: 12.5 INJECTION, SOLUTION INTRAVENOUS at 10:23

## 2023-02-18 RX ADMIN — ROCURONIUM BROMIDE 20 MG: 10 SOLUTION INTRAVENOUS at 10:32

## 2023-02-18 RX ADMIN — SODIUM BICARBONATE 100 MEQ: 84 INJECTION, SOLUTION INTRAVENOUS at 09:00

## 2023-02-18 RX ADMIN — SODIUM BICARBONATE 100 MEQ: 84 INJECTION, SOLUTION INTRAVENOUS at 08:32

## 2023-02-18 RX ADMIN — PROPOFOL 20 MG: 10 INJECTION, EMULSION INTRAVENOUS at 08:18

## 2023-02-18 RX ADMIN — Medication 120 MCG: at 08:32

## 2023-02-18 RX ADMIN — Medication 200 MCG: at 10:21

## 2023-02-18 RX ADMIN — PROPOFOL 25 MCG/KG/MIN: 10 INJECTION, EMULSION INTRAVENOUS at 10:46

## 2023-02-18 RX ADMIN — Medication 120 MCG: at 10:06

## 2023-02-18 RX ADMIN — Medication 160 MCG: at 09:17

## 2023-02-18 RX ADMIN — SODIUM CHLORIDE: 900 INJECTION, SOLUTION INTRAVENOUS at 08:18

## 2023-02-18 RX ADMIN — ROCURONIUM BROMIDE 100 MG: 10 SOLUTION INTRAVENOUS at 08:18

## 2023-02-18 RX ADMIN — FENTANYL CITRATE 100 MCG: 50 INJECTION, SOLUTION INTRAMUSCULAR; INTRAVENOUS at 08:45

## 2023-02-18 RX ADMIN — SODIUM CHLORIDE 60 MCG/MIN: 9 INJECTION, SOLUTION INTRAVENOUS at 10:10

## 2023-02-18 RX ADMIN — SODIUM CHLORIDE, POTASSIUM CHLORIDE, SODIUM LACTATE AND CALCIUM CHLORIDE: 600; 310; 30; 20 INJECTION, SOLUTION INTRAVENOUS at 08:18

## 2023-02-18 NOTE — PROGRESS NOTES
Day #1 of Cefazolin  Indication:  Prophylaxis while Impella in place  Current regimen:  1 g IV every 6 hours  Abx regimen: Cefazolin monotherapy  Recent Labs     23  1147 23  0617 23  0146 23  2252 23  0358   WBC 9.3  --  12.1*  --  13.1*   CREA 2.95* 2.81*  --  2.49* 2.14*   BUN 56* 52*  --  46* 38*     Est CrCl: CVVHD  Temp (24hrs), Av.4 °F (38 °C), Min:98 °F (36.7 °C), Max:101.5 °F (38.6 °C)    Cultures: None    Plan: Change to cefazolin 2 g IV x 1 loading dose, followed by cefazolin 1 g IV every 8 hours per [facility] P&T Committee Protocol with respect to renal function (CVVHD). Pharmacy will continue to monitor patient daily and will make dosage adjustments based upon changing renal function.

## 2023-02-18 NOTE — ANESTHESIA POSTPROCEDURE EVALUATION
Post-Anesthesia Evaluation and Assessment    Patient: Maral Bell MRN: [id number]  SSN: [id number]    YOB: 1950  Age: 71 y.o. Sex: male      I have evaluated the patient and they are stable in the ICU. Cardiovascular Function/Vital Signs  Visit Vitals  BP (!) 70/50   Pulse 86   Temp 36.7 °C (98 °F)   Resp 17   Ht 5' 6\" (1.676 m)   Wt 67.7 kg (149 lb 4 oz)   SpO2 94%   BMI 24.09 kg/m²       Patient is status post General anesthesia for Procedure(s):  LEFT GROIN RP IMPELLA INSERTION, KIARRA BY DR Rere Greer. Nausea/Vomiting: None    Postoperative hydration reviewed and adequate. Pain:  Pain Scale 1: Adult Nonverbal Pain Scale (02/18/23 0400)  Pain Intensity 1: 2 (02/18/23 0400)   Managed    Neurological Status:   Neuro  Neurologic State: Drowsy; Lethargic (02/17/23 2000)  Orientation Level: Unable to verbalize (pt not saying words r/t BiPAP; nodding head yes/no) (02/17/23 2000)  Cognition: Follows commands (02/17/23 2000)  Speech: Nods appropriately (02/17/23 2000)  LUE Motor Response: Weak (02/17/23 2000)  LLE Motor Response: Weak (02/17/23 2000)  RUE Motor Response: Weak (02/17/23 2000)  RLE Motor Response: Weak (02/17/23 2000)   Intubated and sedated     Pulmonary Status:   O2 Device: Other (comment) (02/18/23 1122)   Intubated with adequate ventilation and oxygenation     Complications related to anesthesia: None    Post-anesthesia assessment completed.  No concerns    Signed By: Radha Perez MD     February 18, 2023

## 2023-02-18 NOTE — ANESTHESIA PREPROCEDURE EVALUATION
Anesthetic History   No history of anesthetic complications            Review of Systems / Medical History  Patient summary reviewed, nursing notes reviewed and pertinent labs reviewed    Pulmonary  Within defined limits                 Neuro/Psych              Cardiovascular    Hypertension          Past MI, CAD, cardiac stents, CABG and hyperlipidemia    Exercise tolerance: <4 METS  Comments: EF 25% s/p LVAD now with RV failure   GI/Hepatic/Renal         Renal disease: CRI       Endo/Other    Diabetes: type 2         Other Findings              Physical Exam    Airway  Mallampati: II  TM Distance: 4 - 6 cm  Neck ROM: normal range of motion   Mouth opening: Normal     Cardiovascular  Regular rate and rhythm,  S1 and S2 normal,  no murmur, click, rub, or gallop             Dental    Dentition: Poor dentition and Lower partial plate     Pulmonary  Breath sounds clear to auscultation               Abdominal  GI exam deferred       Other Findings            Anesthetic Plan    ASA: 5, emergent  Anesthesia type: general    Monitoring Plan: Arterial line, BIS, CVP, [place] and KIARRA      Induction: Intravenous  Anesthetic plan and risks discussed with: Patient

## 2023-02-18 NOTE — DIALYSIS
CRRT / [phone]    Orders   Mode: CVVHD   Blood Flow Rate: 250 ml/min   Prismasol Dose: 1700 ml/hr (25 ml/kg/hr X 68 kg)   Prismasol Concentrate: 2K, 3.5 Ca   Blood Warmer Temp: 37 C   Net Fluid Removal: 25 ml/hr     Metrics   BP: 72/40   HR: 86   Access Pressure: -65   Filter Pressure:    Return Pressure: 50   TMP: 56   Pressure Drop: 11     Access   Type & Location: LIJ temporary non-tunneled CVC, dressing C/D/I with bio-patch dated (DATE). No signs of redness, drainage, or infection visualized. Each catheter limb disinfected for 60 seconds per limb with alcohol swabs. Caps removed, dialysis CVC hub scrubbed with Prevantics for 15 seconds, followed by a 5 second dry time per Hospital P&P. +asp/+flush x 2 ports. Comments:                                        Labs   HBsAg (Antigen) / date: Unknown/drawn                                               HBsAb (Antibody) / date: Unknown/drawn   Source:      Safety:   Time Out Done:   (Time) 0410   Consent obtained/signed: Verified   Education: Access/Site Care   Primary Nurse Rpt Pre: Vanesa Florentino RN   Primary Nurse Rpt Post: Vanesa Florentino RN     Comments / Plan:   Daina Aguilar RN at bedside to initiate CRRT per MD orders. Patient, code status, labs, and orders verified. Consents on chart. Time out completed. HF-1000 filter set-up, primed w/ 1L NS, tested and treatment started at 0430. Lines visible and connections secure with blood warmer to return line. Immediately after treatment initiation the patient's blood pressure dropped into the 42\"U systolic.and he began to have flow issues with his LVAD. Charla Cowden MD notified by the primary RN. All possible blood rinsed back to the patient with 165 mls NS. CRRT on hold per the critical care physician until the patient stabilizes.

## 2023-02-18 NOTE — DIALYSIS
CRRT / [phone]    Orders   Mode: CVVHD restarted @ 1155   Blood Flow Rate: 250 mL/min   Prismasol Dose: 25ml/kg/hr  (68kg)  DFR: 1700 ml/hr   Prismasol Concentrate: 2K/3.5Ca   Blood Warmer Temp: 37 *C   Net Fluid Removal: 0 ml/hr     Metrics   BP: 73/47   HR: 86   Access Pressure: -55   Filter Pressure: 82   Return Pressure: 31   TMP: 21   Pressure Drop: 19     Access   Type & Location: LIJ non-tunneled CVC   Comments: CHG Transparent dressing CDI and dated 02/18/23. Each catheter limb disinfected for 60 seconds per limb with alcohol swabs and each dialysis CVC hub scrubbed with Prevantics for 15 seconds, followed by a 5 second dry time per Hospital P&P. Labs   HBsAg (Antigen) Ángel Duketo: Neg (02/18/23)             HBsAb (Antibody) /date: Susceptible (02/18/23)   Source: [employer]     Safety:   Time Out Done: (Time) 1130   Consent obtained/signed: Verified   Education: Intubated/sedated   Primary Nurse Rpt: REGGIE Mejia RN     Comments / Plan:   Patient, code status, labs, and orders verified. Consent on chart. Time out complete. CVVHD restarted upon pt return from OR. [id number] set-up, primed 1L NS, tested and running well. Lines visible and connections secure with blood warmer supported on return line, set at 37*C. Education & pre/post report to primary RN.

## 2023-02-18 NOTE — PROGRESS NOTES
[facility] Brief Note    Chart reviewed, noted ongoing rise in LFTs and Cr, CVP 15-17, CI 1.5, MAP 50's, now anuric and requiring high dose dual inotropic support. Echo showed persistent RV failure. Noted plans for RVAD placement this morning. Will see pt post-op.          Fawad San NP  [facility]  [address] [facility], [address]  [facility], [address] [place] [address]  Office [phone]  Fax [phone]

## 2023-02-18 NOTE — PROCEDURES
Procedure Note - Central Venous Access:   Performed by Meggan Stock DO  Diagnosis: RRT  Insertion Date: 02/18/23  Time:9:57 AM  Obtained Consent? yes; informed   Procedure Location:  ICU    Immediately prior to the procedure, the patient was reevaluated and found suitable for the planned procedure and any planned medications. Immediately prior to the procedure a time out was called to verify the correct patient, procedure, equipment, staff, and marking as appropriate. Central line Bundle:  Full sterile barrier precautions used. 7-Step Sterility Protocol followed. (cap, mask sterile gown, sterile gloves, large sterile sheet, hand hygiene, 2% chlorhexidine for cutaneous antisepsis)  5 mL 1% Lidocaine placed at insertion site. Patient positioned in Trendelenburg? Yes  The site was prepped with {Chlorhexidine  Catheter inserted into a new site. Using Seldinger technique a Arrow Hemodialysis Catheter was placed in the Left, Internal Jugular Vein via direct cannulation with 1 number of attempts. Ultrasound Guidance was utilized. There was good dark, non-pulsatile blood return in all ports. Catheter secured. Biopatch/CHG bio-occlusive dressing in place? Yes  The following complications were encountered: None. A follow-up chest x-ray is pending  The procedure was tolerated well.         Meggan Stock DO  Critical Care Medicine  [facility]

## 2023-02-18 NOTE — PROGRESS NOTES
[facility] in [place], [place]  Inpatient Progress Note      Patient name: Mary Beth Hilliard  Patient : 1950  Patient MRN: [id number]  Consulting MD: Lilliam Williamson MD  Date of service: 23    REASON FOR REFERRAL:  Management of LVAD     PLAN OF CARE:  72 y/o male with likely combined non-ischemic and ischemic cardiomyopathy, LVEF 25-30%, stage D, NYHA class IV now s/p HM3 LVAD as DT 2/15/23 by Dr. Júnior Stovall  C/b RV failure requiring Impella RP placed 23  C/b acute on chronic renal failure, requiring CRRT  C/b hepatic failure  C/b lactic acidosis   Patient was approved for LVAD implantation as destination therapy at [facility] 2/3/23; the following have been identified and d/w patient as relative concerns pertaining to LVAD candidacy: small body surface area, cachexia, BMI 18, malnutrition, frailty, advanced age, muscular deconditioning, PVD (fingertips), urinary retention requiring donnelly catheter, neuroendocrine tumor class 1 requiring treatment after LVAD, mild neurocognitive dysfunction, chronic kidney disease and hearing loss requiring hearing aid       RECOMMENDATIONS:  S/p Impella RP placement this morning  Continue inotropic support with epi and dobut, with Impella RP and plan to start CRRT  Continue dobutamine 5 mcg/kg/min  Will try to wean epi if able given concerns for elevated SVR, poor distal perfusion  No beta-blockers due to hypotension and RV conditioning prior to LVAD  Cannot tolerate ARB/ARNi due to hypotension and upcoming surgical procedure   Cannot tolerate spironolactone due to hyperkalemia  Cannot tolerate jardiance due to significant diuresis on smallest dose/contributed to IVVD  Previously discontinued corlanor due to SVT  Digoxin stopped d/t risk for toxicity with amio loading  Discontinue amiodarone due to intermitted heart blocks under pacemaker  Nephrology consultation to assist through renal failure, plans to start CRRT  Keep K+ >4 and Mg >2  Transfuse to keep hgb > 7  Perioperative antibiotics per protocol  Continue baby aspirin  INR elevated today 3.1, off heparin, no warfarin tonight; monitor INR daily   Timing of extubation per intensivist and CTS  D/w Dr. Law Waggoner and bedside RN     All other care per primary team      INTERVAL HISTORY:  POD 3  Tmax 102  Anuric overnight  S/p placement of Impella RP this morning  Tbili rising, LFTs rising, Cr rising       IMPRESSION:  Acute on chronic combined systolic/diastolic  heart failure  Stage D, NYHA class IV symptoms   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, LVEF 20% (by echo 1/2023) and 23% (by cMRI 1/3/23)  Cardiac MRI suggestive of ischemic cardiomyopathy  PYP equivocal  Chronic milrinone and dobutamine infusion as palliation   Coronary artery disease  CAD s/p CABG x 2: further disease best managed medically due to small vessel size   At risk of sudden cardiac death  Peripheral arterial disease  Bilateral hydronephrosis s/p donnelly  Cardiac risk factors:  HTN  HL  TRISTAN, STOP-BANG 4  DM2  CKD, stage 3  MOCA from 2/9 19/30, consistent with mild cognitive impairment  Hard of hearing  Gastric Neuroendocrine Tumor, elevated gastrin and chromogranin A  Sepsis, unclear source- resolved         LIFE GOALS:  Lifestyle goals reviewed with the patient. Patient's personal goals include: having a few more years with family  Important upcoming milestones or family events: None at this time   The patient identifies the following as important for living well: being at home, not being SOB              CARDIAC IMAGING:  Echo 1/27/23    Left Ventricle: EF by visual approximation is 20%. Left ventricle is mildly dilated. Mitral Valve: Moderately thickened leaflet, at the anterior and posterior leaflets. Moderately calcified leaflet. Moderate regurgitation. Left Atrium: Left atrium is moderately dilated.     Technical qualifiers: Echo study was technically difficult with poor endocardial visualization. Contrast used: Definity. Limited study, not all structures viewed     Echo 1/9/23   Left Ventricle: Severely reduced left ventricular systolic function with a visually estimated EF of 25 - 30%. Left ventricle size is normal. Mildly increased wall thickness. Echo 12/26/22    Left Ventricle: Severely reduced left ventricular systolic function with a visually estimated EF of 25 - 30%. Left ventricle size is normal. Normal wall thickness. There are regional wall motion abnormalities. Grade II diastolic dysfunction with increased LAP. Right Ventricle: Moderately reduced systolic function. TAPSE is abnormal. TAPSE is 1.1 cm. Aortic Valve: Mild stenosis of the aortic valve. AV peak gradient is 13 mmHg. AV peak velocity is 1.8 m/s. Mitral Valve: Not well visualized. Moderate annular calcification at the posterior leaflet of the mitral valve. Mild to moderate regurgitation. Tricuspid Valve: Mildly elevated RVSP. Left Atrium: Left atrium is moderately dilated. 12/8/22    Left Ventricle: Moderately reduced left ventricular systolic function with a visually estimated EF of 35 - 40%. Severe hypokinesis of the following segments: mid anteroseptal, apical anterior, apical septal, apical inferior and apical lateral. Severe hypokinesis of the apex. Mitral Valve: Severely thickened leaflet, at the anterior and posterior leaflets. Severely calcified leaflet, at the anterior and posterior leaflets. Mild annular calcification of the mitral valve. Moderate regurgitation. Left Atrium: Left atrium is mildly dilated. Contrast used: Definity. limited study     EKG 12/22/22 ST, Biatria enlargement, marked ST abnormality     C 12/6/22  1. Normal LVEDP  2. Severe native multivessel coronary artery disease  3. Patent LIMA to LAD and vein graft to distal RCA  4. Recurrent ISR in OM1 stent with now 60 to 70% restenosis  5.   Recoil of left main and circumflex stent with now recurrent 40 to 50% stenosis. 6.  Progression of ostial left main disease now to about 60% stenosis  7. Progression of disease in jailed first marginal branch now with diffuse 90% stenosis  8. High-grade stenosis in the mid to distal right potential femoral artery treated with 6 x 40 mm impact drug-coated balloon angioplasty to reduce the stenosis to less than 40%        HEMODYNAMICS:  RHC 23  PA 20/9, RA 3, PCWP 8, CI 1.8     CPEST too ill   6MW 300 feet       LVAD INTERROGATION:  Device interrogated in person  Device function normal, normal flow, no events  LVAD   Pump Speed (RPM): 4800  Pump Flow (LPM): 2.8  PI (Pulsitility Index): 5.4  Power: 3.1   Test: No  Back Up  at Bedside & Labeled: Yes  Power Module Test: No  Driveline Site Care: Yes  Driveline Dressing: Clean, Dry, and Intact  Testing  Alarms Reviewed: Yes  Back up SC speed: 4800  Back up Low Speed Limit: 4400  Emergency Equipment with Patient?: Yes  Emergency procedures reviewed?: No  Drive line site inspected?: No  Drive line intergrity inspected?: Yes  Drive line dressing changed?: No    PHYSICAL EXAM:  Visit Vitals  BP (!) 72/40   Pulse 86   Temp (!) 101 °F (38.3 °C)   Resp 25   Ht 5' 6\" (1.676 m)   Wt 149 lb 4 oz (67.7 kg)   SpO2 90%   BMI 24.09 kg/m²     Physical Exam  Vitals and nursing note reviewed. Constitutional:       Appearance: He is ill-appearing. Interventions: He is sedated and intubated. Cardiovascular:      Rate and Rhythm: Normal rate and regular rhythm. Comments: LVAD Humm noted on auscultation   Pulmonary:      Effort: Pulmonary effort is normal. No respiratory distress. He is intubated. Abdominal:      Comments: Drivelien exit site with dressing C/D/I   Musculoskeletal:      Right lower le+ Pitting Edema present. Left lower le+ Pitting Edema present. Skin:     Capillary Refill: Capillary refill takes more than 3 seconds.            REVIEW OF SYSTEMS:  Review of Systems Unable to perform ROS: Intubated           PAST MEDICAL HISTORY:  Past Medical History:   Diagnosis Date    CAD (coronary artery disease) 11/10/2016    NSTEMI & 2 stents    Deafness 10/28/2012    DM (diabetes mellitus) ([facility] [address]     Elevated cholesterol     Hypertension     NSTEMI (non-ST elevated myocardial infarction) ([facility] [address] 11/10/2016       PAST SURGICAL HISTORY:  Past Surgical History:   Procedure Laterality Date    COLONOSCOPY N/A 6/28/2018    COLONOSCOPY performed by Mortimer Smiling, MD at [facility] ENDOSCOPY    COLONOSCOPY N/A 1/18/2023    COLONOSCOPY performed by Odell Kumar MD at [facility] ENDOSCOPY    [address]  11/11/2016    2 stents       FAMILY HISTORY:  Family History   Problem Relation Age of Onset    Heart Disease Father     Heart Attack Father     Hypertension Mother     Elevated Lipids Brother     Elevated Lipids Brother     No Known Problems Sister     Elevated Lipids Brother     No Known Problems Son     No Known Problems Daughter     Anesth Problems Neg Hx        SOCIAL HISTORY:  Social History     Socioeconomic History    Marital status:    Tobacco Use    Smoking status: Never     Passive exposure: Never    Smokeless tobacco: Never   Vaping Use    Vaping Use: Never used   Substance and Sexual Activity    Alcohol use: Yes     Alcohol/week: 2.0 standard drinks     Types: 1 Cans of beer, 1 Shots of liquor per week     Comment: rarely    Drug use: No    Sexual activity: Yes     Social Determinants of Health     Financial Resource Strain: Medium Risk    Difficulty of Paying Living Expenses: Somewhat hard   Food Insecurity: Food Insecurity Present    Worried About Running Out of Food in the Last Year: Never true    Ran Out of Food in the Last Year: Often true       LABORATORY RESULTS:     Labs Latest Ref Rng & Units 2/18/2023 2/18/2023 2/17/2023 2/17/2023 2/16/2023 2/16/2023 2/15/2023   WBC 4.1 - 11.1 K/uL - 12. 1(H) - 13. 1(H) - 11. 8(H) 18. 3(H)   RBC 4.10 - 5.70 M/uL - 4.08(L) - 4.31 - 4.61 4.62   Hemoglobin 12.1 - 17.0 g/dL - 11. 0(L) - 11. 5(L) - 12.3 12.5   Hematocrit 36.6 - 50.3 % - 35. 7(L) - 37.9 - 39.8 39.3   MCV 80.0 - 99.0 FL - 87.5 - 87.9 - 86.3 85.1   Platelets 651 - 509 K/uL - 101(L) - 112(L) - 143(L) 152   Lymphocytes 12 - 49 % - - - - - 8(L) -   Monocytes 5 - 13 % - - - - - 6 -   Eosinophils 0 - 7 % - - - - - 0 -   Basophils 0 - 1 % - - - - - 1 -   Albumin 3.5 - 5.0 g/dL 3. 0(L) 3. 2(L) - 3. 3(L) - 3.8 -   Calcium 8.5 - 10.1 MG/DL 9.0 - 8.8 9.1 8.9 8.8 -   Glucose 65 - 100 mg/dL 97 - 84 93 159(H) 214(H) -   BUN 6 - 20 MG/DL 52(H) - 46(H) 38(H) 35(H) 30(H) -   Creatinine 0.70 - 1.30 MG/DL 2.81(H) - 2.49(H) 2.14(H) 1.96(H) 1.81(H) -   Sodium 136 - 145 mmol/L 145 - 144 143 142 142 -   Potassium 3.5 - 5.1 mmol/L 5.0 - 4.4 4.5 4.5 4.1 -   TSH 0.36 - 3.74 uIU/mL - - - - - - -   PSA 0.01 - 4.0 ng/mL - - - - - - -   LDH 85 - 241 U/L - 767(H) - 696(H) - 399(H) -   Some recent data might be hidden     Lab Results   Component Value Date/Time    TSH 3.52 01/28/2023 05:26 AM    TSH 2.12 12/27/2022 02:36 PM    TSH 4.80 (H) 12/06/2022 03:53 AM    TSH 5.39 (H) 10/12/2022 09:10 AM    TSH 3.53 02/03/2022 11:47 AM    TSH 5.790 (H) 11/21/2019 04:45 PM    TSH 3.08 06/22/2018 01:53 PM    TSH 4.250 05/26/2015 09:43 AM       ALLERGY:  Allergies   Allergen Reactions    Ambien [Zolpidem] Other (comments)     Causes extreme confusion        CURRENT MEDICATIONS:    Current Facility-Administered Medications:     heparin (porcine) 1,000 unit/mL injection 1,700 Units, 1,700 Units, InterCATHeter, DIALYSIS PRN, 1,700 Units at 02/18/23 0645 **AND** heparin (porcine) 1,000 unit/mL injection 1,500 Units, 1,500 Units, InterCATHeter, DIALYSIS PRN, Seamus Perez DO, 1,500 Units at 02/18/23 0645    acetaminophen (TYLENOL) solution 650 mg, 650 mg, Oral, Q4H PRN, Jenna Cope MD, 650 mg at 02/17/23 1269    acetaminophen (TYLENOL) suppository 650 mg, 650 mg, Rectal, Q4H PRN, Jenna Cope MD, 650 mg at 02/17/23 2013    bicarbonate dialysis (PRISMASOL) BG K 2/Ca 3.5 5000 ml solution, , Extracorporeal, DIALYSIS CONTINUOUS, Mojgan Bates III, MD    HYDROmorphone (DILAUDID) injection 0.5 mg, 0.5 mg, IntraVENous, Q3H PRN, Sarah MOLINA MD, 0.5 mg at 02/18/23 0554    HYDROmorphone (DILAUDID) injection 1 mg, 1 mg, IntraVENous, Q4H PRN, Sarah MOLINA MD, 1 mg at 02/17/23 1708    heparin 25,000 units in D5W 250 ml infusion, 12-25 Units/kg/hr, IntraVENous, TITRATE, Nasir Nichols MD, Stopped at 02/18/23 0726    albumin human 25% (BUMINATE) solution 12.5 g, 12.5 g, IntraVENous, Q6H, Nasir Nichols MD, Held at 02/17/23 2247    Warfarin - MD/NP dosing, , Other, Rx Dosing/Monitoring, Nasir Nichols MD    [facility]) injection 0.5 mg, 0.5 mg, IntraVENous, Q4H PRN, Nasir Nichols MD, 0.5 mg at 02/17/23 1431    insulin regular (MYXREDLIN, NOVOLIN, HUMULIN) 100 units/100 ml NS infusion (premix), 0-50 Units/hr, IntraVENous, TITRATE, Shaila, Lizette B, NP, Stopped at 02/17/23 2049    glucose chewable tablet 16 g, 4 Tablet, Oral, PRN, Shaila, Lizette B, NP    glucagon (GLUCAGEN) injection 1 mg, 1 mg, IntraMUSCular, PRN, Shaila, Lizette B, NP    insulin lispro (HUMALOG) injection, , SubCUTAneous, TIDAC, Sheldon, Cathy, CNS    sodium chloride (NS) flush 5-40 mL, 5-40 mL, IntraVENous, Q8H, Shaila, Lizette B, NP, 10 mL at 02/17/23 1534    sodium chloride (NS) flush 5-40 mL, 5-40 mL, IntraVENous, PRN, Shaila, Lizette B, NP, 40 mL at 02/17/23 1818    naloxone (NARCAN) injection 0.4 mg, 0.4 mg, IntraVENous, PRN, Shaila, Lizette B, NP    mupirocin (BACTROBAN) 2 % ointment, , Both Nostrils, BID, Lalo Lintonin B, NP, Given at 02/17/23 1819    amiodarone (CORDARONE) tablet 400 mg, 400 mg, Oral, Q12H, Shaila, Lizette B, NP    ELECTROLYTE REPLACEMENT NOTE: Nurse to review Serum Potassium and Magnesuim levels and Initiate Electrolyte Replacement Protocol as needed, 1 Each, Other, PRN, Shaila, Lizette B, NP    dextrose 10 % infusion 0-250 mL, 0-250 mL, IntraVENous, PRN, Shaila, Lizette B, NP, Last Rate: 100 mL/hr at 02/17/23 2055, 75 mL at 02/17/23 2055    acetaminophen (TYLENOL) tablet 650 mg, 650 mg, Oral, Q6H PRN, Shaila, Lizette B, NP, 650 mg at 02/17/23 0200    oxyCODONE IR (ROXICODONE) tablet 10 mg, 10 mg, Oral, Q4H PRN, Shaila, Lizette B, NP    [Held by provider] atorvastatin (LIPITOR) tablet 10 mg, 10 mg, Oral, QPM, Shaila, Lizette B, NP    ondansetron (ZOFRAN) injection 4 mg, 4 mg, IntraVENous, Q4H PRN, Shaila, Lizette B, NP    albuterol-ipratropium (DUO-NEB) 2.5 MG-0.5 MG/3 ML, 3 mL, Nebulization, Q6H PRN, Shaila, Lizette B, NP    aspirin chewable tablet 81 mg, 81 mg, Oral, DAILY, Shaila, Lizette B, NP, 81 mg at 02/17/23 0942    chlorhexidine (PERIDEX) 0.12 % mouthwash 10 mL, 10 mL, Oral, BID, Shaila, Lizette B, NP, 10 mL at 02/17/23 2007    senna-docusate (Pricilla Nephew) 8.6-50 mg per tablet 1 Tablet, 1 Tablet, Oral, DAILY, Shaila, Lizette B, NP, 1 Tablet at 02/16/23 0907    polyethylene glycol (MIRALAX) packet 17 g, 17 g, Oral, DAILY, Shaila, Lizette B, NP, 17 g at 02/16/23 0906    bisacodyL (DULCOLAX) suppository 10 mg, 10 mg, Rectal, DAILY PRN, Shaila, Lizette B, NP    sucralfate (CARAFATE) tablet 1 g, 1 g, Oral, AC&HS, Shaila, Lizette B, NP, 1 g at 02/17/23 1628    0.45% sodium chloride infusion, 10 mL/hr, IntraVENous, CONTINUOUS, Edom Banco, MD, Last Rate: 10 mL/hr at 02/17/23 2031, 10 mL/hr at 02/17/23 2031    niCARdipine (CARDENE) 25 mg in 0.9% sodium chloride 250 mL ([id number]), 0-15 mg/hr, IntraVENous, TITRATE, Allen MOLINA MD, Stopped at 02/17/23 2325    DOBUTamine (DOBUTREX) 500 mg/250 mL (2,000 mcg/mL) infusion, 0-10 mcg/kg/min, IntraVENous, TITRATE, Juan Jose Johnson MD, Last Rate: 11.7 mL/hr at 02/18/23 0802, 7 mcg/kg/min at 02/18/23 0802    dexmedeTOMidine in 0.9 % NaCl (PRECEDEX) 400 mcg/100 mL (4 mcg/mL) infusion soln, 0.1-1.5 mcg/kg/hr, IntraVENous, TITRATE, Miroslava Johnson MD, Last Rate: 4.2 mL/hr at 23, 0.3 mcg/kg/hr at 23    0.9% sodium chloride infusion, 9 mL/hr, IntraVENous, CONTINUOUS, Miroslava Johnson MD, Last Rate: 9 mL/hr at 23, 9 mL/hr at 23    EPINEPHrine (ADRENALIN) 10 mg in 0.9% sodium chloride 250 mL infusion, 0-10 mcg/min, IntraVENous, TITRATE, Shaila, Lizette B, NP, Last Rate: 15 mL/hr at 23, 10 mcg/min at 23    NOREPINephrine (LEVOPHED) 32,000 mcg in dextrose 5% 250 mL (128 mcg/mL) infusion, 0.5-16 mcg/min, IntraVENous, TITRATE, Shaila, Lizette B, NP, Last Rate: 0.469 mL/hr at 02/15/23 1344, 1 mcg/min at 02/15/23 1344    Facility-Administered Medications Ordered in Other Encounters:     propofoL (DIPRIVAN) 10 mg/mL injection, , IntraVENous, PRN, Kelleella ummer, CRNA, 20 mg at 23 0818    rocuronium injection, , IntraVENous, PRN, Francella Drummer, CRNA, 100 mg at 23 0818    PHENYLephrine (NEOSYNEPHRINE) in NS syringe, , IntraVENous, PRN, Kelleella ummer, CRNA, 120 mcg at 23 3845    sodium bicarbonate 8.4 % (1 mEq/mL) injection, , IntraVENous, PRN, Francella Drummer, CRNA, 100 mEq at 23 4068    PATIENT CARE TEAM:  Patient Care Team:  Hollie Morrow MD as PCP - General (Family Medicine)  Hollie Morrow MD as PCP - [facility]  Sagrario Restrepo MD (Cardiovascular Disease Physician)  Jose Selby MD (Gastroenterology)  Travon Mercado MD (Cardiothoracic Surgery)  Siena Street MD (Cardiovascular Disease Physician)  Sherre Apgar, MD (Nephrology)  Booker Aguilera MD (Pulmonary Disease)  Khushbu Reeves MD ([address] [place] [address] Vascular Surgery)     Thank you for allowing me to participate in this patient's care.     Ayaka Trejo NP   [address] [place] [address], [address]  Phone: [phone]

## 2023-02-18 NOTE — PROGRESS NOTES
CRITICAL CARE NOTE      Name: Mariposa Osorio   : 1950   MRN: [id number]   Date: 2023      Reason for ICU Admission: Acute on chronic HFrEF, LVAD workup     ICU PROBLEM LIST   Acute on chronic HFrEF (20%) NYHA IIIb/IV (D) on home inotropic support, life vest  TANNER on CKD3  CHB post op  Aflutter w/ RVR  Acute on chronic hypoxemic respiratory failure  CAP  CAD  Gastric neuroendocrine tumor  Hx of LUE DVT  DM  PAD  TRISTAN  BPH w/ urinary retention requiring chronic donnelly    HISTORY OF PRESENT ILLNESS:   Pt is a 71 y.o M w/ PMH as noted above admitted to [facility] on  due to ongoing symptoms secondary to his HFrEF. Treated for possible CAP, and diuresed for acute on chronic HFrEF. Nephrology following for TANNER on CKD 3. AHF team recommended transfer to CVICU for [facility] placement and ongoing LVAD workup, with placement 2/15.      24 HOUR EVENTS:   Patient extubated yesterday evening, however CVP noted to be uptrending and became anuric without response to Bumex overnight with metabolic acidosis and uptrending lactic. Dialysis catheter placed and CRRT attempted however patient unable to tolerate due to LVAD low flow on start of therapy and CRRT aborted. Discussed with CTS and advanced heart failure team's, patient taken to OR for Impella RP placement. Per anesthesia report KIRARA with significantly underfilled LV, improvement after LVAD flows decreased, and Impella placement. Returned to CVICU intubated sedated, V paced to 86.     NEUROLOGICAL:    Propofol, Precedex, fentanyl gtt  Goal RASS 0 to -1  RASHAD, we will keep intubated until more hemodynamically stable  Delirium precautions  Encourage work w/ PT/OT once  extubated    PULMONOLOGY:   Lung protective ventilation  SBT in a.m.  Keep intubated until hemodynamics more stable  Monitor blood gas    CARDIOVASCULAR:   Status post Impella RP placement  On epi, dobutamine, wean as tolerated  CVP goal 10-15  Cardene PRN fo maintain SVR down, MAP <75  Lct normalized  LVAD  2.6L @ 4400 RPM  Monitor chest tube output  V  paced to 86 due to CHB, monitor for return of underlying rhythm  C/w ASA, statin  Goal euvolemia  close hemodynamic monitoring, trend lct,  monitor for evidence  of hypoperfusion   Unable to tolerate BB, ARNI/ARB due to , aldactone held 2/2 elevated K     GASTROINTESTINAL:   NPO, ADAT once extubated  PPI     RENAL/ELECTROLYTE/FLUIDS:   Strict I/Os,  goal  euvolemia  Started on CRRT, maintain net even  Monitor for renal recovery  Nephrology following  Monitor RFP  Mg/Phos/K >2/3/4  Vasquez to remain in place    ENDOCRINE:   Insulin gtt per protocol  Resume SSI, Basal insulin when indicated  Hypoglycemic protocol  Glucose goal 120-180    HEMATOLOGY/ONCOLOGY:   Heparin gtt  EPO weekly  Gastric neuroendocrine tumor, well differentiated, good prognosis per onc.      ID/MICRO:   Tana  VAD implant Vanc,  rif, levaquin, fluconazole    Ancef while Impella in place    ICU DAILY CHECKLIST     Code Status:Full  DVT Prophylaxis:heparin  T/L/D: ETT, Impella PIV,  Vasquez,  V  wire,  valentin  x5, LVAD drive line  SUP: PPI  Diet: NPO  Activity Level:ad jose f  ABCDEF Bundle/Checklist Completed:Yes  Disposition: Stay in ICU  Multidisciplinary Rounds Completed:  yes  Patient/Family Updated: Yes    [facility]   2/3 Transferred to CVICU for [facility] placement  2/7 started on dobutamine as adjunct to milrinone  2/15 LVAD implant  2/16 extubated  2/18 Impella RP placement    SUBJECTIVE:     As noted above    Review of Systems:     Unable to perform due to patient intubated    OBJECTIVE:     Labs and Data: Reviewed 02/18/23  Medications: Reviewed 02/18/23  Imaging: Reviewed 02/18/23    General - sedated, intubated chronically ill appearing  HEENT- pupils equal, nystagmus, anicteric  Neuro - sedated, no focal deficit  CV - Paced,  VAD hum, post sternotomy, valentin  x5  Resp - rales b/l, NC  Abd - soft, distended, nontender, no guarding  MSK- intact, no sacral ulcer  Right femoral Impella, LVAD driveline in left chest    Visit Vitals  BP (!) 73/47   Pulse 86   Temp 99 °F (37.2 °C)   Resp 19   Ht 5' 6\" (1.676 m)   Wt 67.7 kg (149 lb 4 oz)   SpO2 97%   BMI 24.09 kg/m²    O2 Flow Rate (L/min): 20 l/min O2 Device: Other (comment) Temp (24hrs), Av.4 °F (38 °C), Min:98 °F (36.7 °C), Max:101.5 °F (38.6 °C)    CVP (mmHg): 11 mmHg (23 1300)      Intake/Output:     Intake/Output Summary (Last 24 hours) at 2023 1329  Last data filed at 2023 1300  Gross per 24 hour   Intake 3230.02 ml   Output 973 ml   Net 2257.02 ml         Imaging    23    ECHO ADULT FOLLOW-UP OR LIMITED 2023    Interpretation Summary    Left Ventricle: EF by visual approximation is 20%. Left ventricle is mildly dilated. Mitral Valve: Moderately thickened leaflet, at the anterior and posterior leaflets. Moderately calcified leaflet. Moderate regurgitation. Left Atrium: Left atrium is moderately dilated. Technical qualifiers: Echo study was technically difficult with poor endocardial visualization. Contrast used: Definity. Limited study, not all structures viewed    Signed by: Fabrice Machado MD on 2023  4:39 PM       Pertinent imaging reviewed and interpreted as noted above    CRITICAL CARE DOCUMENTATION  I had a face to face encounter with the patient, reviewed and interpreted patient data including clinical events, labs, images, vital signs, I/O's, and examined patient. I have discussed the case and the plan and management of the patient's care with the consulting services, the bedside nurses and the respiratory therapist.      NOTE OF PERSONAL INVOLVEMENT IN CARE   This patient has a high probability of imminent, clinically significant deterioration, which requires the highest level of preparedness to intervene urgently.  I participated in the decision-making and personally managed or directed the management of the following life and organ supporting interventions that required my frequent assessment to treat or prevent imminent deterioration. I personally spent 104 minutes of critical care time. This is time spent at this critically ill patient's bedside actively involved in patient care as well as the coordination of care. This does not include any procedural time which has been billed separately. Walker Boss MD  Staff [facility]

## 2023-02-18 NOTE — ANESTHESIA PROCEDURE NOTES
KIARRA  Date/Time: 2/18/2023 11:02 AM      Procedure Details: probe placement, image aquisition & interpretation    Risks and benefits discussed with the patient and plans are to proceed    Procedure Note    Performed by: Perez Haywood MD  Authorized by: Perez Haywood MD     Indications: assessment of ascending aorta and assessment of surgical repair  Modalities: 2D, CF, CWD, PWD  Probe Type: biplane and multiplane  Insertion: atraumatic  Patient Status: intubated and sedated  Echocardiographic and Doppler Measurements   Aorta  Size  Diam(cm)  Dissection PlaqueThick(mm)  Plaque Mobile    Ascending normal  No  No    Arch normal  No  No    Descending normal  No  No          Valves  Annulus  Stenosis  Area/Grad  Regurg  Leaflet   Morph  Leaflet   Motion    Aortic normal none  2+ normal normal    Mitral normal none  0 normal normal    Tricuspid normal none  0 normal normal          Atria  Size  SEC (smoke)  Thrombus  Tumor  Device    Rt Atrium dilated No No  No    Lt Atrium dilated No No  No     Interatrial Septum Morphology: normal    Interventricular Septum Morphology: normal  Ventricle  Cavity Size  Cavity Dimension Hypertrophy  Thrombus  Gloal FXN  EF    RV normal  No no moderately impaired     LV normal  Yes No         Regional Function  (1 = normal, 2 = mildly hypokinetic, 3 = severely hypokinetic, 4 = akinetic, 5 = dyskinetic) LAV - [place]   ME LAV = 0  ME LAV = 90  ME LAV = 130                                     Pericardium: normal  Effusion: normal  Post Intervention Follow-up Study  Ventricular Global Function: improved  Ventricular Regional Function: improved     Valve  Function  Regurgitation  Area    Aortic no change 2+     Mitral no change 0     Tricuspid no change 2+, 3+     Prosthetic        Complications: None  Comments: Exam done for examination of LVAD and insertion of impella RP    Initially LV completely collapsed with moderate RV dysfunction.  Turned down LVAD speed to 4400 rpm, treated acidosis, increased afterload and LV cavity size improved. RV function improved as well to moderate dysfunction. Mild AI, aortic valve not consistently opening, no thrombus seen. TR 3+. Unable to assess mitral valve fully. Small pericardial effusion with no tamponade. S/p Impella RP placement. Seen in PA with appropriate positioning. RV function remains moderate dysfunction. LV cavity size improved.  Flows appear equal on right and left with impela at P9 and LVAD at 4400rpm.

## 2023-02-18 NOTE — PROGRESS NOTES
7734- bedside report received. Plan for RP this AM.    2341- patient on monitor, bipap, IV meds (dobutamine and epi), placed on batteries, emergency equipment and tower; transported to [address] with this RN.     **this RN remained in OR entire time for LVAD coverage**    1112- arrived to CVICU on vent, RP, LVAD, IV meds. Impella Rep at bedside as well. 1155- CRRT started by [facility] RN. Precedex restarted, RR 19-21.    1204- updated Fadi on labile MAP 61-63, flow 2.5, epi 6 mcg, dobutamine 5 mcg; Orders for albumin. 1235- ABG performed; results given to [facility] MD. Orders to decreased FiO2 to 60%. 1315- epi decreased back to 5 mcg; MAP 78, flows 2.5    1353- updated Fadi on continued low flows, MAP 68-71. He adjusted the impella to P9, flows improved to 2.6    1415- ABG repeated; results given to [facility]. No orders received. 1429- impella suction alarming, decreased P level to 8. Alarm resolved. 1431- LVAD alarming low flow, increased impella back to P9. No longing low flowing. 1520- epi weaned off per [facility], MD; MAP 83.    1523- cardene started; MAP 83.    1535- albumin given for suction alarms on impella. 1545- impella decreased to P8 for suction alarms. 1645- still have intermittent suction alarms on impella; decreased to P7. Suction alarms stopped; no low flows on LVAD. 1835- patient cool to the touch. Unable to get axillary temp. Bladder probe attached to monitor, temp is 94.1; bare hugger applied. 2000- Bedside and Verbal shift change report given to Mina Bill RN (oncoming nurse) by Estefani Blackburn RN (offgoing nurse). Report included the following information SBAR, Kardex, Recent Results, and Cardiac Rhythm V paced .

## 2023-02-18 NOTE — PROGRESS NOTES
Deterioration overnight with anuria   Unable to tolerate CVVH  ECHO demonstrates persistent RV failure despite inotropic support  Will plan to place RVAD  Spoke with family

## 2023-02-18 NOTE — PROGRESS NOTES
RENAL  PROGRESS NOTE        Subjective: Intubated. Objective:   VITALS SIGNS:    Visit Vitals  BP (!) 73/47   Pulse 85   Temp 99 °F (37.2 °C)   Resp 17   Ht 5' 6\" (1.676 m)   Wt 67.7 kg (149 lb 4 oz)   SpO2 98%   BMI 24.09 kg/m²       O2 Device: Other (comment)   O2 Flow Rate (L/min): 20 l/min   Temp (24hrs), Av.4 °F (38 °C), Min:98 °F (36.7 °C), Max:101.5 °F (38.6 °C)         PHYSICAL EXAM:  NAD  +ETT  Trace ankle edema  Vasquez  sedated    DATA REVIEW:     INTAKE / OUTPUT:   Last shift:       07 - 1900  In: 1814.1 [I.V.:1814.1]  Out: 617 [Urine:78; Drains:50]  Last 3 shifts: 1901 -  0700  In: 2371 [I.V.:2271]  Out: 2142 [Urine:1297; Drains:695]    Intake/Output Summary (Last 24 hours) at 2023 1439  Last data filed at 2023 1400  Gross per 24 hour   Intake 3382.15 ml   Output 1457 ml   Net 1925.15 ml           LABS:   Recent Labs     23  1147 23  0146 23  0358   WBC 9.3 12.1* 13.1*   HGB 9.1* 11.0* 11.5*   HCT 29.8* 35.7* 37.9   PLT 70* 101* 112*       Recent Labs     23  1147 23  0617 23  0146 23  2252 23  0358 23  0146 23  1933   * 145  --  144   < >  --  142   K 4.6 5.0  --  4.4   < >  --  4.5   * 117*  --  116*   < >  --  114*   CO2 18* 13*  --  19*   < >  --  18*   * 97  --  84   < >  --  159*   BUN 56* 52*  --  46*   < >  --  35*   CREA 2.95* 2.81*  --  2.49*   < >  --  1.96*   CA 8.9 9.0  --  8.8   < >  --  8.9   MG 2.5* 2.7* 2.8*  --    < >  --  2.7*   PHOS  --  7.1*  --   --   --   --   --    ALB 3.1* 3.0* 3.2*  --    < >  --   --    TBILI 7.2* 8.6* 8.5*  --    < >  --   --    * 123* 61  --    < >  --   --    INR  --   --  3.1*  --   --  1.6* 1.4*    < > = values in this interval not displayed. Assessment:  TANNER on CKD-3a: Suspect cardiorenal effects with needed diuresis. Now has LVAD and need to watch for POST OP ATN. Ischemic CM: Recurrent exacerbations. EF 20%. S/p LVAD on 2/15    BPH     Well differentiated NET Grade 1: noted on EGD 1/18/23     Anemia 2 to CKD: improved    High Mg      Left UE basilic and radial vein thrombus    TANNER had resolved. Now bumped to 1.8  to 2. 14. non oliguric. Lytes OK. Plan/Recommendations:    Became anuric last night. Tried to start CVVHD and did not tolerate. Went back to the OR this AM.  Back on CVVHD with factor 0 and 2K bags.       Pressors as needed    I&Os  Daily labs  Avoid nephrotoxins  Avoid magnesium containing supplements or laxatives    Discussed with RN

## 2023-02-18 NOTE — PROGRESS NOTES
Bedside and Verbal shift change report given to [address] (oncoming nurse) by Ramone Borges RN (offgoing nurse). Report included the following information SBAR, Kardex, and OR Summary. 2030 CI 1.3, SVR >1900  Nicardipine restarted at 2.5  CI 1.6 SVR 1568 on Nicardipine gtt    ABG 7.33 / 37.2 / 148 / 19.8  2200 Pt started on HFNC Flow 20, 80% FiO2    2310 Pt de-satting to 83-85 SpO2; SvO2 dropped to 45  Pt placed back on BiPAP 12 / / 100%;  SpO2 and SvO2 slowly recovering  Lung Sounds much more diminished on R side  Intensivist at bedside  CXR ordered    0050 Dialysis cath placed    0400 Started CRRT, LVAD low flow alarms, low MAP (58-60)  CRRT circuit rinsed back

## 2023-02-19 LAB
ADMINISTERED INITIALS, ADMINIT: NORMAL
D50 ADMINISTERED, D50ADM: 0 ML
D50 ORDER, D50ORD: 0 ML
GLUCOSE, GLC: 100 MG/DL
GLUCOSE, GLC: 101 MG/DL
GLUCOSE, GLC: 101 MG/DL
GLUCOSE, GLC: 103 MG/DL
GLUCOSE, GLC: 107 MG/DL
GLUCOSE, GLC: 109 MG/DL
GLUCOSE, GLC: 114 MG/DL
GLUCOSE, GLC: 116 MG/DL
GLUCOSE, GLC: 138 MG/DL
GLUCOSE, GLC: 148 MG/DL
GLUCOSE, GLC: 82 MG/DL
GLUCOSE, GLC: 83 MG/DL
GLUCOSE, GLC: 84 MG/DL
GLUCOSE, GLC: 85 MG/DL
GLUCOSE, GLC: 86 MG/DL
GLUCOSE, GLC: 86 MG/DL
GLUCOSE, GLC: 91 MG/DL
GLUCOSE, GLC: 93 MG/DL
GLUCOSE, GLC: 95 MG/DL
HIGH TARGET, HITG: 130 MG/DL
INSULIN ADMINSTERED, INSADM: 0 UNITS/HOUR
INSULIN ADMINSTERED, INSADM: 0.2 UNITS/HOUR
INSULIN ADMINSTERED, INSADM: 0.4 UNITS/HOUR
INSULIN ADMINSTERED, INSADM: 0.8 UNITS/HOUR
INSULIN ADMINSTERED, INSADM: 0.9 UNITS/HOUR
INSULIN ADMINSTERED, INSADM: 1.1 UNITS/HOUR
INSULIN ADMINSTERED, INSADM: 1.1 UNITS/HOUR
INSULIN ADMINSTERED, INSADM: 1.6 UNITS/HOUR
INSULIN ORDER, INSORD: 0 UNITS/HOUR
INSULIN ORDER, INSORD: 0.2 UNITS/HOUR
INSULIN ORDER, INSORD: 0.4 UNITS/HOUR
INSULIN ORDER, INSORD: 0.8 UNITS/HOUR
INSULIN ORDER, INSORD: 0.9 UNITS/HOUR
INSULIN ORDER, INSORD: 1.1 UNITS/HOUR
INSULIN ORDER, INSORD: 1.1 UNITS/HOUR
INSULIN ORDER, INSORD: 1.6 UNITS/HOUR
LOW TARGET, LOT: 95 MG/DL
MINUTES UNTIL NEXT BG, NBG: 120 MIN
MINUTES UNTIL NEXT BG, NBG: 120 MIN
MINUTES UNTIL NEXT BG, NBG: 60 MIN
MULTIPLIER, MUL: 0
MULTIPLIER, MUL: 0.01
MULTIPLIER, MUL: 0.01
MULTIPLIER, MUL: 0.02
MULTIPLIER, MUL: 0.03
ORDER INITIALS, ORDINIT: NORMAL

## 2023-02-19 NOTE — PROGRESS NOTES
CRITICAL CARE NOTE      Name: Minna Acosta   : 1950   MRN: [id number]   Date: 2023      Reason for ICU Admission: Acute on chronic HFrEF, LVAD workup     ICU PROBLEM LIST   Acute on chronic HFrEF (20%) NYHA IIIb/IV (D) on home inotropic support, life vest  TANNER on CKD3  CHB post op  Aflutter w/ RVR  Acute on chronic hypoxemic respiratory failure  CAP  CAD  Gastric neuroendocrine tumor  Hx of LUE DVT  DM  PAD  TRISTAN  BPH w/ urinary retention requiring chronic donnelly    HISTORY OF PRESENT ILLNESS:   Pt is a 71 y.o M w/ PMH as noted above admitted to [facility] on  due to ongoing symptoms secondary to his HFrEF. Treated for possible CAP, and diuresed for acute on chronic HFrEF. Nephrology following for TANNER on CKD 3. AHF team recommended transfer to CVICU for [facility] placement and ongoing LVAD workup, with placement 2/15. Pt extubated , however with development of worsening renal dysfunction overnight and unable to tolerate CRRT so was reintubated and taken for Impella RP placement for right-sided support in a.m. of .    24 HOUR EVENTS:   No acute overnight events with maps in 60s, remains sedated intubated. Remains on dobutamine, epi weaned off throughout the day yesterday. Given some IVF's due to Impella suction episodes, Impella on P7 and LVAD with flows at 2.5 L at 4400 RPMs. On CRRT maintaining net even fluid status. V paced to 86. Chest tubes 250 mL out last 24 hours    Impella decreased to P6 without changes of LVAD flows or hemodynamics, and under echo guidance LVAD increased to 4800 RPMs with 2.8 to 3 L of flow and MAP increasing from 60 to 70 without suction events.     NEUROLOGICAL:    Propofol, Precedex, fentanyl gtt  Goal RASS 0 to -1  RASHAD, we will keep intubated until more hemodynamically stable  Delirium precautions  Encourage work w/ PT/OT once  extubated    PULMONOLOGY:   Lung protective ventilation  SBT in a.m.  Keep intubated until hemodynamics more stable  Monitor blood gas    CARDIOVASCULAR:   Status post Impella RP placement  Acute thrombocytopenia and elevated LDH secondary to high Impella RPMs. Decreased reported P6 without change in hemodynamics or LVAD flows. We will continue to monitor. On dobutamine 5, will consider reinitiation of epinephrine if low flows in spite of optimization of volume status and RVAD support  LVAD  3L @ 4800 RPM  Heparin for anticoagulation, Coumadin bridge  CVP goal around 15  Cardene PRN fo maintain SVR down, MAP <75  Lct normalized post Impella and CRRT, trend  Repeat echo this morning, follow results  Monitor chest tube output  V  paced to 86 due to CHB, monitor for return of underlying rhythm  C/w ASA, statin  Goal euvolemia  Unable to tolerate BB, ARNI/ARB due to , aldactone held 2/2 elevated K     GASTROINTESTINAL:   NPO, ADAT once extubated  PPI     RENAL/ELECTROLYTE/FLUIDS:   Strict I/Os,  goal  euvolemia  Started on CRRT, maintain net even  Monitor for renal recovery  Nephrology following  Monitor RFP  Mg/Phos/K >2/3/4  Vasquez to remain in place    ENDOCRINE:   Insulin gtt per protocol  Resume SSI, Basal insulin when indicated  Hypoglycemic protocol  Glucose goal 120-180    HEMATOLOGY/ONCOLOGY:   Heparin gtt, low suspicion for HIT at this time however will send HIT panel  Suspect thrombocytopenia secondary to shock and Impella, adjust the dose as noted above  After discussion with heart failure service will continue heparin at this time however low threshold for transition to bival  Hemoglobin goal greater than 7, platelet goal greater than 50  EPO weekly  Gastric neuroendocrine tumor, well differentiated, good prognosis per onc.      ID/MICRO:   Tana  VAD implant Vanc,  rif, levaquin, fluconazole    Ancef while Impella in place    ICU DAILY CHECKLIST     Code Status:Full  DVT Prophylaxis:heparin  T/L/D: ETT, Impella PIV,  Vasquez,  V  wire,  valentin  x5, LVAD drive line  SUP: PPI  Diet: NPO  Activity Level:ad jose f  ABCDEF Bundle/Checklist Completed:Yes  Disposition: Stay in ICU  Multidisciplinary Rounds Completed:  yes  Patient/Family Updated: Yes    Tatiana   2/3 Transferred to CVICU for [facility] placement   started on dobutamine as adjunct to milrinone  2/15 LVAD implant   extubated   Impella RP placement    SUBJECTIVE:     As noted above    Review of Systems:     Unable to perform due to patient intubated    OBJECTIVE:     Labs and Data: Reviewed 23  Medications: Reviewed 23  Imaging: Reviewed 23    General - sedated, intubated chronically ill appearing  HEENT- pupils equal, nystagmus, anicteric  Neuro - sedated, no focal deficit  CV - Paced,  VAD hum, post sternotomy, valentin  x5  Resp - rales b/l, NC  Abd - soft, distended, nontender, no guarding  MSK- intact, no sacral ulcer  Right femoral Impella, LVAD driveline in left chest    Visit Vitals  BP (!) 86/53   Pulse 86   Temp 98.4 °F (36.9 °C)   Resp 10   Ht 5' 6\" (1.676 m)   Wt 68 kg (149 lb 14.6 oz)   SpO2 95%   BMI 24.20 kg/m²    O2 Flow Rate (L/min): 20 l/min O2 Device: Endotracheal tube, Heated, Ventilator Temp (24hrs), Av.8 °F (36 °C), Min:93.6 °F (34.2 °C), Max:99 °F (37.2 °C)    CVP (mmHg): 20 mmHg (23 1100)      Intake/Output:     Intake/Output Summary (Last 24 hours) at 2023 1152  Last data filed at 2023 1100  Gross per 24 hour   Intake 3410.87 ml   Output 2927 ml   Net 483.87 ml       Imaging    23    ECHO ADULT FOLLOW-UP OR LIMITED 2023    Interpretation Summary    Left Ventricle: EF by visual approximation is 20%. Left ventricle is mildly dilated. Mitral Valve: Moderately thickened leaflet, at the anterior and posterior leaflets. Moderately calcified leaflet. Moderate regurgitation. Left Atrium: Left atrium is moderately dilated. Technical qualifiers: Echo study was technically difficult with poor endocardial visualization. Contrast used: Definity.     Limited study, not all structures viewed    Signed by: Cierra Sanders MD on 1/27/2023  4:39 PM       Pertinent imaging reviewed and interpreted as noted above    Pedro Hewitt  I had a face to face encounter with the patient, reviewed and interpreted patient data including clinical events, labs, images, vital signs, I/O's, and examined patient. I have discussed the case and the plan and management of the patient's care with the consulting services, the bedside nurses and the respiratory therapist.      NOTE OF PERSONAL INVOLVEMENT IN CARE   This patient has a high probability of imminent, clinically significant deterioration, which requires the highest level of preparedness to intervene urgently. I participated in the decision-making and personally managed or directed the management of the following life and organ supporting interventions that required my frequent assessment to treat or prevent imminent deterioration. I personally spent 45 minutes of critical care time. This is time spent at this critically ill patient's bedside actively involved in patient care as well as the coordination of care. This does not include any procedural time which has been billed separately. Walker Montes MD  Staff [facility]

## 2023-02-19 NOTE — PROGRESS NOTES
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 Casi Keith  C/b RV failure requiring Impella RP placed 23  C/b acute on chronic renal failure, requiring CRRT  C/b hepatic failure- improving  C/b lactic acidosis- resolved   Patient was approved for LVAD implantation as destination therapy at [facility] 2/3/23; the following have been identified and d/w patient as relative concerns pertaining to LVAD candidacy: small body surface area, cachexia, BMI 18, malnutrition, frailty, advanced age, muscular deconditioning, PVD (fingertips), urinary retention requiring donnelly catheter, neuroendocrine tumor class 1 requiring treatment after LVAD, mild neurocognitive dysfunction, chronic kidney disease and hearing loss requiring hearing aid         RECOMMENDATIONS:  Continue Impella RP at P7, HM3 LVAD at 4400rpms  Stat Echo ordered to re-assess RV and LV size and to guide optimization of pump speeds  Continue dobutamine 5 mcg/kg/min  Has been stable off epi- no further low flows or Impella suction alarms  Can consider resuming low dose epi if needed pending echo and repeat labs  No beta-blockers due to hypotension and RV conditioning prior to LVAD  Cannot tolerate ARB/ARNi due to hypotension and upcoming surgical procedure   Cannot tolerate spironolactone due to hyperkalemia  Cannot tolerate jardiance due to significant diuresis on smallest dose/contributed to IVVD  Previously discontinued corlanor due to SVT  Digoxin stopped d/t risk for toxicity with amio loading  Discontinued amiod due to intermitted heart blocks under pacemaker  Nephrology consult appreciated, continue CRRT- no factor for now  Keep K+ >4 and Mg >2  Transfuse to keep hgb > 7  Perioperative antibiotics per protocol  Monitor inflammatory and infectious markers; low threshold for resuming broad spectrum abx coverage  Hold ASA d/t thrombocytopenia  Warfarin 2mg tonight; monitor INR daily   Maintain heparin gtt until 2 days with consecutive therapeutic INR  Noted drop in Plts, HIT panel sent per intensivist- although suspicion is for consumptive thrombocytopenia; if HIT panel + or further drop in plt, then can switch to bivalrudin if INR not therapeutic  Timing of extubation per intensivist and CTS  D/w Dr. Tanya Giordano, Dr. Arlene Altamirano and bedside RN on the unit and with Dr. Ketan Matthew over the phone     All other care per primary team      INTERVAL HISTORY:  POD 4  Afebrile last 24 hrs (on CRRT)  Started on CRRT yesterday with 0 factor  Acidosis improved  Pt became slightly hypertensive yesterday evening with MAPs in mid-upper 80's and low flow alarms  Epi weaned off given high afterload and concern for bowel and distal ischemia  Pt given albumin yesterday d/t Impella RP suction alarms and low flows  Making small amts urine again  LFTs downtrending, Cr improved on CRRT  Lactic acid down trending  Increased LDH with Impella RP at P8-P9  T-bili up today (suspect hemolysis contributing in addition to resolving ischemic vs congestive hepatopathy)  This morning pt appear warm and well perfused  No alarms on LVAD since 3-4pm yesterday       IMPRESSION:  Acute on chronic combined systolic/diastolic  heart failure  Stage D, NYHA class IV symptoms   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, LVEF 20% (by echo 1/2023) and 23% (by cMRI 1/3/23)  Cardiac MRI suggestive of ischemic cardiomyopathy  PYP equivocal  S/p HeartMate 3  LVAD-DT (2/15/23)  C/b RV failure requiring Impella RP (2/18/23)  C/b franco on CKD requiring CRRT  C/b liver dysfunction (ischemic vs congestive)  Coronary artery disease  CAD s/p CABG x 2: further disease best managed medically due to small vessel size   At risk of sudden cardiac death  Peripheral arterial disease  Bilateral hydronephrosis s/p andrzej  Cardiac risk factors:  HTN  HL  TRISTAN, STOP-BANG 4  DM2  CKD, stage 3  MOCA from 2/9 19/30, consistent with mild cognitive impairment  Hard of hearing  Gastric Neuroendocrine Tumor, elevated gastrin and chromogranin A  Sepsis, unclear source- resolved           LIFE GOALS:  Lifestyle goals reviewed with the patient. Patient's personal goals include: having a few more years with family  Important upcoming milestones or family events: None at this time   The patient identifies the following as important for living well: being at home, not being SOB              CARDIAC IMAGING:  Echo 1/27/23    Left Ventricle: EF by visual approximation is 20%. Left ventricle is mildly dilated. Mitral Valve: Moderately thickened leaflet, at the anterior and posterior leaflets. Moderately calcified leaflet. Moderate regurgitation. Left Atrium: Left atrium is moderately dilated. Technical qualifiers: Echo study was technically difficult with poor endocardial visualization. Contrast used: Definity. Limited study, not all structures viewed     Echo 1/9/23   Left Ventricle: Severely reduced left ventricular systolic function with a visually estimated EF of 25 - 30%. Left ventricle size is normal. Mildly increased wall thickness. Echo 12/26/22    Left Ventricle: Severely reduced left ventricular systolic function with a visually estimated EF of 25 - 30%. Left ventricle size is normal. Normal wall thickness. There are regional wall motion abnormalities. Grade II diastolic dysfunction with increased LAP. Right Ventricle: Moderately reduced systolic function. TAPSE is abnormal. TAPSE is 1.1 cm. Aortic Valve: Mild stenosis of the aortic valve. AV peak gradient is 13 mmHg. AV peak velocity is 1.8 m/s. Mitral Valve: Not well visualized. Moderate annular calcification at the posterior leaflet of the mitral valve. Mild to moderate regurgitation. Tricuspid Valve: Mildly elevated RVSP. Left Atrium: Left atrium is moderately dilated. 12/8/22    Left Ventricle: Moderately reduced left ventricular systolic function with a visually estimated EF of 35 - 40%. Severe hypokinesis of the following segments: mid anteroseptal, apical anterior, apical septal, apical inferior and apical lateral. Severe hypokinesis of the apex. Mitral Valve: Severely thickened leaflet, at the anterior and posterior leaflets. Severely calcified leaflet, at the anterior and posterior leaflets. Mild annular calcification of the mitral valve. Moderate regurgitation. Left Atrium: Left atrium is mildly dilated. Contrast used: Definity. limited study     EKG 12/22/22 ST, Biatria enlargement, marked ST abnormality     [facility] 12/6/22  1. Normal LVEDP  2. Severe native multivessel coronary artery disease  3. Patent LIMA to LAD and vein graft to distal RCA  4. Recurrent ISR in OM1 stent with now 60 to 70% restenosis  5. Recoil of left main and circumflex stent with now recurrent 40 to 50% stenosis. 6.  Progression of ostial left main disease now to about 60% stenosis  7. Progression of disease in jailed first marginal branch now with diffuse 90% stenosis  8.   High-grade stenosis in the mid to distal right potential femoral artery treated with 6 x 40 mm impact drug-coated balloon angioplasty to reduce the stenosis to less than 40%        HEMODYNAMICS:  [facility] 1/9/23  PA 20/9, RA 3, PCWP 8, CI 1.8     CPEST too ill   6MW 300 feet       LVAD INTERROGATION:  Device interrogated in person  Device function normal, normal flow, no events  LVAD   Pump Speed (RPM): 4400  Pump Flow (LPM): 2.6  PI (Pulsitility Index): 6.2  Power: 2.6   Test: Yes  Back Up  at Bedside & Labeled: Yes  Power Module Test: Yes  Driveline Site Care: No  Driveline Dressing: Clean, Dry, and Intact  Testing  Alarms Reviewed: Yes  Back up SC speed: 4400  Back up Low Speed Limit: 4000  Emergency Equipment with Patient?: Yes  Emergency procedures reviewed?: No  Drive line site inspected?: No  Drive line intergrity inspected?: Yes  Drive line dressing changed?: No    PHYSICAL EXAM:  Visit Vitals  BP (!) 73/47   Pulse 86   Temp 98.1 °F (36.7 °C)   Resp 20   Ht 5' 6\" (1.676 m)   Wt 150 lb 12.7 oz (68.4 kg)   SpO2 95%   BMI 24.34 kg/m²     Physical Exam  Vitals and nursing note reviewed. Constitutional:       Appearance: He is ill-appearing. Interventions: He is sedated and intubated. Cardiovascular:      Rate and Rhythm: Normal rate and regular rhythm. Comments: LVAD Humm noted on auscultation   Pulmonary:      Effort: Pulmonary effort is normal. No respiratory distress. He is intubated. Comments: Coarse lung sounds  Abdominal:      Comments: Driveline exit site with dressing C/D/I   Musculoskeletal:      Right lower le+ Pitting Edema present. Left lower le+ Pitting Edema present. Comments: Left groin Impella in place, dressing C/D/I   Skin:     Capillary Refill: Capillary refill takes more than 3 seconds.            REVIEW OF SYSTEMS:  Review of Systems   Unable to perform ROS: Intubated           PAST MEDICAL HISTORY:  Past Medical History:   Diagnosis Date    CAD (coronary artery disease) 11/10/2016    NSTEMI & 2 stents    Deafness 10/28/2012    DM (diabetes mellitus) ([facility] [address]     Elevated cholesterol     Hypertension     NSTEMI (non-ST elevated myocardial infarction) ([facility] [address] 11/10/2016       PAST SURGICAL HISTORY:  Past Surgical History:   Procedure Laterality Date    COLONOSCOPY N/A 2018    COLONOSCOPY performed by Russ Duvall MD at [facility] ENDOSCOPY    COLONOSCOPY N/A 2023    COLONOSCOPY performed by Sandor Barraza MD at [address] [place] [address] 11/11/2016    2 stents       FAMILY HISTORY:  Family History   Problem Relation Age of Onset    Heart Disease Father     Heart Attack Father     Hypertension Mother     Elevated Lipids Brother     Elevated Lipids Brother     No Known Problems Sister     Elevated Lipids Brother     No Known Problems Son     No Known Problems Daughter     Anesth Problems Neg Hx        SOCIAL HISTORY:  Social History     Socioeconomic History    Marital status:    Tobacco Use    Smoking status: Never     Passive exposure: Never    Smokeless tobacco: Never   Vaping Use    Vaping Use: Never used   Substance and Sexual Activity    Alcohol use: Yes     Alcohol/week: 2.0 standard drinks     Types: 1 Cans of beer, 1 Shots of liquor per week     Comment: rarely    Drug use: No    Sexual activity: Yes     Social Determinants of Health     Financial Resource Strain: Medium Risk    Difficulty of Paying Living Expenses: Somewhat hard   Food Insecurity: Food Insecurity Present    Worried About [address] in the Last Year: Never true    Ran Out of Food in the Last Year: Often true       LABORATORY RESULTS:     Labs Latest Ref Rng & Units 2/19/2023 2/18/2023 2/18/2023 2/18/2023 2/18/2023 2/17/2023 2/17/2023   WBC 4.1 - 11.1 K/uL 3. 3(L) - 9.3 - 12. 1(H) - 13. 1(H)   RBC 4.10 - 5.70 M/uL 2.90(L) - 3.37(L) - 4.08(L) - 4.31   Hemoglobin 12.1 - 17.0 g/dL 7. 9(L) - 9. 1(L) - 11. 0(L) - 11. 5(L)   Hematocrit 36.6 - 50.3 % 24. 6(L) - 29. 8(L) - 35. 7(L) - 37.9   MCV 80.0 - 99.0 FL 84.8 - 88.4 - 87.5 - 87.9   Platelets 817 - 310 K/uL 49(LL) - 70(L) - 101(L) - 112(L)   Lymphocytes 12 - 49 % 16 - - - - - -   Monocytes 5 - 13 % 4(L) - - - - - -   Eosinophils 0 - 7 % 6 - - - - - -   Basophils 0 - 1 % 1 - - - - - -   Albumin 3.5 - 5.0 g/dL 3.7 - 3. 1(L) 3.0(L) 3. 2(L) - 3. 3(L)   Calcium 8.5 - 10.1 MG/DL 9.7 9.5 8.9 9.0 - 8.8 9.1   Glucose 65 - 100 mg/dL 92 152(H) 118(H) 97 - 84 93   BUN 6 - 20 MG/DL 37(H) 46(H) 56(H) 52(H) - 46(H) 38(H)   Creatinine 0.70 - 1.30 MG/DL 1.93(H) 2.48(H) 2.95(H) 2.81(H) - 2.49(H) 2.14(H)   Sodium 136 - 145 mmol/L 144 145 150(H) 145 - 144 143   Potassium 3.5 - 5.1 mmol/L 3. 1(L) 3.4(L) 4.6 5.0 - 4.4 4.5   TSH 0.36 - 3.74 uIU/mL - - - - - - -   PSA 0.01 - 4.0 ng/mL - - - - - - -   LDH 85 - 241 U/L 1,054(H) - 977(H) - 767(H) - 696(H)   Some recent data might be hidden     Lab Results   Component Value Date/Time    TSH 3.52 01/28/2023 05:26 AM    TSH 2.12 12/27/2022 02:36 PM    TSH 4.80 (H) 12/06/2022 03:53 AM    TSH 5.39 (H) 10/12/2022 09:10 AM    TSH 3.53 02/03/2022 11:47 AM    TSH 5.790 (H) 11/21/2019 04:45 PM    TSH 3.08 06/22/2018 01:53 PM    TSH 4.250 05/26/2015 09:43 AM       ALLERGY:  Allergies   Allergen Reactions    Ambien [Zolpidem] Other (comments)     Causes extreme confusion        CURRENT MEDICATIONS:    Current Facility-Administered Medications:     warfarin (COUMADIN) tablet 2 mg, 2 mg, Oral, ONCE, Padmini STALEY, JACOB    heparin (porcine) 1,000 unit/mL injection 1,700 Units, 1,700 Units, InterCATHeter, DIALYSIS PRN, 1,700 Units at 02/18/23 0645 **AND** heparin (porcine) 1,000 unit/mL injection 1,500 Units, 1,500 Units, InterCATHeter, DIALYSIS PRN, [facility], DO, 1,500 Units at 02/18/23 0645    propofol (DIPRIVAN) 10 mg/mL infusion, 0-50 mcg/kg/min, IntraVENous, TITRATE, Vira Hearn MD, Last Rate: 16.2 mL/hr at 02/19/23 0939, 40 mcg/kg/min at 02/19/23 0939    sodium bicarbonate (8.4%) 25 mEq in dextrose 5% 1,000 mL infusion, , Other, TITRATE, Jack Del Toro MD, Last Rate: 12 mL/hr at 02/18/23 1413, New Bag at 02/18/23 1413    [COMPLETED] ceFAZolin (ANCEF) 2 g in sterile water (preservative free) 20 mL IV syringe, 2 g, IntraVENous, ONCE, 2 g at 02/18/23 1425 **FOLLOWED BY** ceFAZolin (ANCEF) 1 g in sterile water (preservative free) 10 mL IV syringe, 1 g, IntraVENous, Q8H, Walker Aponte MD, 1 g at 02/19/23 0607    fentaNYL (PF) 1,500 mcg/30 mL (50 mcg/mL) infusion, 0-200 mcg/hr, IntraVENous, TITRATE, Noman Foley MD, Last Rate: 3 mL/hr at 02/19/23 0933, 150 mcg/hr at 02/19/23 0933    balsam peru-castor oiL (VENELEX) ointment, , Topical, BID, Ashia Johnson MD, Given at 02/19/23 0841    niCARdipine (CARDENE) 50 mg in 0.9% sodium chloride 100 mL infusion, 0-15 mg/hr, IntraVENous, TITRATE, Ashia Johnson MD, Paused at 02/18/23 2315    acetaminophen (TYLENOL) solution 650 mg, 650 mg, Oral, Q4H PRN, Krishna Bell MD, 650 mg at 02/17/23 2631    acetaminophen (TYLENOL) suppository 650 mg, 650 mg, Rectal, Q4H PRN, Krishna Bell MD, 650 mg at 02/17/23 2013    bicarbonate dialysis (PRISMASOL) BG K 2/Ca 3.5 5000 ml solution, , Extracorporeal, DIALYSIS CONTINUOUS, Annalisa Bajwa III, MD, Last Rate: 2,000 mL/hr at 02/19/23 0854, New Bag at 02/19/23 0854    HYDROmorphone (DILAUDID) injection 0.5 mg, 0.5 mg, IntraVENous, Q3H PRN, Lilian MOLINA MD, 0.5 mg at 02/18/23 0554    HYDROmorphone (DILAUDID) injection 1 mg, 1 mg, IntraVENous, Q4H PRN, Lilian MOLINA MD, 1 mg at 02/17/23 1708    heparin 25,000 units in D5W 250 ml infusion, 12-25 Units/kg/hr, IntraVENous, TITRATE, Jack Del Toro MD, Last Rate: 2.7 mL/hr at 02/19/23 0754, 4 Units/kg/hr at 02/19/23 0754    albumin human 25% (BUMINATE) solution 12.5 g, 12.5 g, IntraVENous, Q6H, Jack Del Toro MD, 12.5 g at 02/19/23 0551    Warfarin - MD/NP dosing, , Other, Rx Dosing/Monitoring, Jack Del Toro MD    bumetanide [facility]) injection 0.5 mg, 0.5 mg, IntraVENous, Q4H PRN, Jack Del Toro MD, 0.5 mg at 02/17/23 1431    insulin regular (MYXREDLIN, NOVOLIN, HUMULIN) 100 units/100 ml NS infusion (premix), 0-50 Units/hr, IntraVENous, TITRATE, Lizette Linton, NP, Held at 02/19/23 0413    glucose chewable tablet 16 g, 4 Tablet, Oral, PRN, Shaila, Lizette B, NP    glucagon (GLUCAGEN) injection 1 mg, 1 mg, IntraMUSCular, PRN, Shaila Lizette B, NP    insulin lispro (HUMALOG) injection, , SubCUTAneous, TIDAC, Cathy Sheldon, CNS sodium chloride (NS) flush 5-40 mL, 5-40 mL, IntraVENous, Q8H, Shaila, Lizette B, NP, 10 mL at 02/19/23 0600    sodium chloride (NS) flush 5-40 mL, 5-40 mL, IntraVENous, PRN, Shaila, Lizette B, NP, 40 mL at 02/17/23 1818    naloxone (NARCAN) injection 0.4 mg, 0.4 mg, IntraVENous, PRN, Shaila, Lizette B, NP    mupirocin (BACTROBAN) 2 % ointment, , Both Nostrils, BID, Shaila, Lizette B, NP, Given at 02/19/23 0841    amiodarone (CORDARONE) tablet 400 mg, 400 mg, Oral, Q12H, Shaila, Lizette B, NP, 400 mg at 02/18/23 2131    ELECTROLYTE REPLACEMENT NOTE: Nurse to review Serum Potassium and Magnesuim levels and Initiate Electrolyte Replacement Protocol as needed, 1 Each, Other, PRN, Shaila, Lizette B, NP    dextrose 10 % infusion 0-250 mL, 0-250 mL, IntraVENous, PRN, Shaila, Lizette B, NP, Last Rate: 100 mL/hr at 02/17/23 2055, 75 mL at 02/17/23 2055    acetaminophen (TYLENOL) tablet 650 mg, 650 mg, Oral, Q6H PRN, Shaila, Lizette B, NP, 650 mg at 02/17/23 0200    oxyCODONE IR (ROXICODONE) tablet 10 mg, 10 mg, Oral, Q4H PRN, Shaila, Lizette B, NP    ondansetron (ZOFRAN) injection 4 mg, 4 mg, IntraVENous, Q4H PRN, Shaila, Lizette B, NP    albuterol-ipratropium (DUO-NEB) 2.5 MG-0.5 MG/3 ML, 3 mL, Nebulization, Q6H PRN, Shaila, Lizette B, NP    [Held by provider] aspirin chewable tablet 81 mg, 81 mg, Oral, DAILY, Shaila, Lizette B, NP, 81 mg at 02/17/23 0942    chlorhexidine (PERIDEX) 0.12 % mouthwash 10 mL, 10 mL, Oral, BID, Shaila, Lizette B, NP, 10 mL at 02/19/23 0841    senna-docusate (PERICOLACE) 8.6-50 mg per tablet 1 Tablet, 1 Tablet, Oral, DAILY, Shaila, Lizette B, NP, 1 Tablet at 02/16/23 0907    polyethylene glycol (MIRALAX) packet 17 g, 17 g, Oral, DAILY, Shaila, Lizette B, NP, 17 g at 02/19/23 0840    bisacodyL (DULCOLAX) suppository 10 mg, 10 mg, Rectal, DAILY PRN, Shaila, Lizette B, NP    sucralfate (CARAFATE) tablet 1 g, 1 g, Oral, AC&HS, Shaila, Lizette B, NP, 1 g at 02/19/23 0639    0.45% sodium chloride infusion, 10 mL/hr, IntraVENous, CONTINUOUS, Reuben Bethea MD, Last Rate: 10 mL/hr at 02/19/23 0835, 10 mL/hr at 02/19/23 0835    DOBUTamine (DOBUTREX) 500 mg/250 mL (2,000 mcg/mL) infusion, 0-10 mcg/kg/min, IntraVENous, TITRATE, Alyssa Johnson MD, Last Rate: 8.3 mL/hr at 02/19/23 0753, 5 mcg/kg/min at 02/19/23 0753    dexmedeTOMidine in 0.9 % NaCl (PRECEDEX) 400 mcg/100 mL (4 mcg/mL) infusion soln, 0.1-1.5 mcg/kg/hr, IntraVENous, TITRATE, Alyssa Johnson MD, Last Rate: 11.3 mL/hr at 02/19/23 0755, 0.8 mcg/kg/hr at 02/19/23 0755    0.9% sodium chloride infusion, 9 mL/hr, IntraVENous, CONTINUOUS, Alyssa Johnson MD, Last Rate: 6 mL/hr at 02/19/23 0753, 6 mL/hr at 02/19/23 0753    EPINEPHrine (ADRENALIN) 10 mg in 0.9% sodium chloride 250 mL infusion, 0-10 mcg/min, IntraVENous, TITRATE, Lizette Linton NP, Stopped at 02/18/23 1520    NOREPINephrine (LEVOPHED) 32,000 mcg in dextrose 5% 250 mL (128 mcg/mL) infusion, 0.5-16 mcg/min, IntraVENous, TITRATE, Lizette Linton NP, Last Rate: 0.469 mL/hr at 02/15/23 1344, 1 mcg/min at 02/15/23 1344    PATIENT CARE TEAM:  Patient Care Team:  May Bertrand MD as PCP - General (Family Medicine)  May Bertrand MD as PCP - [facility]  Israel Jean MD (Cardiovascular Disease Physician)  Dorothy Pope MD (Gastroenterology)  Jayden Chauhan MD (Cardiothoracic Surgery)  Aspen Mosley MD (Cardiovascular Disease Physician)  Iva Bañuelos MD (Nephrology)  Ting Carter MD (Pulmonary Disease)  Reuben Bethea MD ([address] [facility] Vascular Surgery)     Thank you for allowing me to participate in this patient's care. Critical Care Time: I personally performed 72 mins of critical care time on this patient, not to include any separately billable procedures or services.      Gus Ann NP   [address] [place] [address], [address]  Phone: [phone]

## 2023-02-19 NOTE — PROGRESS NOTES
0745- bedside report and drips verified. Patient intubated AC 14/370/60%/5, sedated, impella RP P7, LVAD, IV meds (dobutamine 5 mcg, heparin, propfol, precedex, fentanyl). [address], MD at bedside for rounds. Updates given on patient status. Discussed labs, feeding, and drips. Orders to change heparin to angiomax and discuss TF with [facility].    049Harley Marshall NP at bedside; update given on overnight and morning events. She will discuss the plan with Vanesa Pope MD.    Dov Quiroga MD and Angela Figueroa NP at bedside; Dakota Dias on phone talking to Vanesa Pope discussing plan for patient. 0930- called echo tech; no answer, left message. NICOM Hemodynamic Monitoring     Visit Vitals  BP (!) 73/47   Pulse 86   Temp 98.1 °F (36.7 °C)   Resp 20   Ht 5' 6\" (1.676 m)   Wt 68.4 kg (150 lb 12.7 oz)   SpO2 95%   BMI 24.34 kg/m²         Baseline assessment:        CO 5.9        CI 3.3        SVI 38        SVV 22               1005- echo tech at bedside. Angela Figueroa and Dakota Dias informed. [facility] increased LVAD speeds to 4800 under echo. Dakota Dias also at bedside. 1215- Orders to pull factor of 25ml/hr per Dakota Dias. 1230- NICOM Hemodynamic Monitoring     Visit Vitals  BP (!) 86/53   Pulse 86   Temp 98.4 °F (36.9 °C)   Resp 20   Ht 5' 6\" (1.676 m)   Wt 68 kg (149 lb 14.6 oz)   SpO2 95%   BMI 24.20 kg/m²         Baseline assessment:        CO 5.5         CI 3.1        SVI 37        SVV 19        TPR 1044     1310- Fadi at bedside; update given on hemodynamics; CVP 12-13, MAP 63-66. Ordered to stop factor of 25ml/hr. 2000- Bedside and Verbal shift change report given to Mina Bill RN (oncoming nurse) by Radha Jones RN (offgoing nurse). Report included the following information Kardex, Recent Results, and Cardiac Rhythm VPaced .

## 2023-02-19 NOTE — OP NOTES
[facility]  OPERATIVE REPORT    Name:  Kishan Ríos  MR#:  [id number]  :  1950  ACCOUNT #:  [de-identified]  DATE OF SERVICE:  2023    PREOPERATIVE DIAGNOSES:  1. Cardiogenic shock secondary to right ventricular failure following placement of left ventricular assist support device. 2.  Acute respiratory failure. POSTOPERATIVE DIAGNOSES:  1. Cardiogenic shock secondary to right ventricular failure following placement of left ventricular assist support device. 2.  Acute respiratory failure. PROCEDURE PERFORMED:  Placement of right ventricular assist device (Impella RP support device). SURGEON:  Sandra Peña MD    ASSISTANT:  None. ANESTHESIA:  General.    COMPLICATIONS:  None. SPECIMENS REMOVED:  None. IMPLANTS:  Right ventricular assist device. ESTIMATED BLOOD LOSS:  Minimal.    PROCEDURE:  This is a patient several days following placement of left ventricular assist device with continued hemodynamic deterioration and respiratory failure who had documented progression of right ventricular failure. The patient was taken to the hybrid operating room suite, and following induction of general endotracheal anesthesia, the anterior chest, abdomen, and both lower extremities were prepped and draped in a sterile manner. The transesophageal echocardiographic findings reviewed and a surgical time-out completed. Under ultrasound guidance, the left common femoral vein was accessed percutaneously. The femoral vein was progressively dilated to accommodate the 25-Bengali sheath for insertion of the Impella RP device. Following multiple attempts, the pulmonary artery was eventually accessed with the PA catheter over wire. The wire left in position and exchanged for the Impella wire. Following further manipulations, the Impella device was eventually located in the left pulmonary vein with excellent flows. The device was secured to the left thigh. Hemodynamics gradually improved. The patient returned to the intensive care unit in critical condition.       MD AIME [email or web address]  D:  02/19/2023 9:11  T:  02/19/2023 12:19  JOB #:  [id number]

## 2023-02-19 NOTE — PROGRESS NOTES
2000: Bedside and Verbal shift change report given to Mina Bill RN (oncoming nurse) by Eliseo Garcia RN (offgoing nurse). Report included the following information SBAR, Kardex, OR Summary, Procedure Summary, Intake/Output, MAR, Recent Results, and Cardiac Rhythm ventricular paced . 2115: Discussed prior ABG results with Intensivist. No new orders received at this time. 2130: K+: 3.4. Orders received for potassium replacement     2310: PTT drawn. 0430: ABGs: pH: 7.6 / pCO2: 24 / pO2: 173.2 / HCO3:23.9; Notified Intensivist. Orders received to increase fentanyl gtt to 150 mcg/hr and give 1mg versed IV push    0445: Critical lab: platelets - 49. Notified Intensivist. No new orders at this time    0600: K: 3.1. Orders received to give 40mEq potassium IV    0700: Tech at bedside for chest x-ray    0800: Bedside and Verbal shift change report given to Eliseo Garcia RN (oncoming nurse) by Mina Bill RN (offgoing nurse). Report included the following information SBAR, Kardex, and Recent Results.

## 2023-02-19 NOTE — PROGRESS NOTES
Critical Care Note     Cardiac surgery was called for the evaluation and management of:    Right heart failure and cardiogenic shock following LVAD Placement    The critical nature of the patient's condition includes the following:  Acute respiratory failure and cardiogenic shock     Diagnosis for which the procedure was performed:    Right ventricular failure     Procedure performed:   Placement of RVAD Right ventricular assist device  Impella RF     Other critical care diagnoses that are being treated and are above and beyond the standard care for the procedure being performed:    Acute Kidney injury         Intake/Output Summary (Last 24 hours) at 2/19/2023 0837  Last data filed at 2/19/2023 0800  Gross per 24 hour   Intake 4052.37 ml   Output 2611 ml   Net 1441.37 ml      Visit Vitals  BP (!) 73/47   Pulse 85   Temp 97.9 °F (36.6 °C)   Resp 21   Ht 5' 6\" (1.676 m)   Wt 150 lb 12.7 oz (68.4 kg)   SpO2 95%   BMI 24.34 kg/m²      CXR Results  (Last 48 hours)                 02/18/23 1301  XR CHEST PORT Final result    Impression:  1. Interval placement of a balloon catheter which is most likely in the   pulmonary outflow tract. 2. No significant change in the appearance of the LVAD, IJ approach catheters or   mediastinal/chest drains. 3. Interval placement of an ET tube which projects to terminate just below the   level of the clavicular heads. Narrative:  INDICATION: . intubated, RP   Additional history:   COMPARISON: Previous chest xray, earlier same day. CT of the chest, 1/29/2023. X-ray of the abdomen, same day   LIMITATIONS: Portable technique. Yumiko Leaf FINDINGS: Single frontal view of the chest.    .   Lines/tubes/surgical: The spinal/chest drains present. A right IJ approach   catheter projects to terminate in the proximal SVC. Left IJ approach sheath is   likely unchanged. An ET tube projects over the airway and terminates just below   the level of the clavicular heads.  Gastric tube better visualized on the   abdominal image. Heart/mediastinum: There appears to be a right inferior approach double lumen   catheter with an unusual orientation, likely in the pulmonary outflow tract (?). LVAD present. Lungs/pleura: Bibasilar opacities. No visible pneumothorax. Additional Comments: None. Cassandra Almaguer 02/18/23 0141  XR CHEST PORT Final result    Impression:  No significant change from prior with dialysis catheter in expected   position and no pneumothorax. Narrative:  EXAM:  XR CHEST PORT       INDICATION: Dialysis cath placement verification       COMPARISON: Chest x-ray 2/17/2023       TECHNIQUE: Semierect portable chest AP view       FINDINGS: Cardiac monitoring leads are noted. Right-sided Clinton-Marce catheter   traverses expected course of terminate in the region of main pulmonary artery. Left-sided central venous catheter in place with the tip in the region of the   right atrium. LVAD device in stable and expected position. There are median   sternotomy wires and surgical clips compatible with prior CABG. The cardiac   silhouette is within normal limits. There is mild pulmonary vascular congestion   with right pleural effusion and overlying airspace opacity. No pneumothorax. The visualized bones and upper abdomen are age-appropriate. 02/17/23 5935  XR CHEST PORT Final result    Impression:  Cardiomegaly with right-sided effusion and right basilar   atelectasis/consolidation. ET tube and NG tube have been removed. LVAD and pulmonary arterial catheter in place. Narrative:  Clinical history: AHRF   INDICATION:   AHRF   COMPARISON: 2/17/2023       FINDINGS:   AP portable upright view of the chest demonstrates a stable enlarged   cardiopericardial silhouette. Small to moderate right effusion. Right pleural   drainage catheter in place. Pulmonary artery catheter projects over the main   pulmonary artery. LVAD in place. .Right basilar atelectasis/consolidation. Cassandra Almaguer Patient is on a cardiac monitor. ET tube and NG tube have been removed. LVAD   Pump Speed (RPM): 4400  Pump Flow (LPM): 2.6  PI (Pulsitility Index): 6.1  Power: 2.6   Test: Yes  Back Up  at Bedside & Labeled: Yes  Power Module Test: Yes  Driveline Site Care: No  Driveline Dressing: Clean, Dry, and Intact  Testing  Alarms Reviewed: Yes  Back up SC speed: 4400  Back up Low Speed Limit: 4000  Emergency Equipment with Patient?: Yes  Emergency procedures reviewed?: No  Drive line site inspected?: No  Drive line intergrity inspected?: Yes  Drive line dressing changed?: No      RVAD Flow at P7  Position unchanged on chest xray   Heparin infusion      Total critical care time -   45 min     I personally spent the above critical care time. This is time spent at this critically ill patient's bedside / unit / floor actively involved in patient care as well as the coordination of care. This does not include any procedural time which has been billed separately. This does not include any NP/PA patient care time. I had a face to face encounter with the patient, reviewed and interpreted patient data including clinical events, labs, images, vital signs, I/O's, and examined patient. I have discussed the case and the plan and management of the patient's care with the consulting services and bedside nurses. This patient has a high probability of imminent, clinically significant deterioration, which requires the highest level of preparedness to intervene urgently. I participated in the decision-making and personally managed or directed the management of life and organ supporting interventions to treat or prevent imminent deterioration. This patient has a critical illness or injury that is acutely impairing one or more vital organ systems such that there is a high probability of imminent or life threatening deterioration in the patient's condition.  This patient requires high complexity medical decision making to assess, manipulate, and support vital organ system failure. After stabilization, this patient still requires management to prevent further life / organ threatening deterioration.

## 2023-02-19 NOTE — PROGRESS NOTES
RENAL  PROGRESS NOTE        Subjective: Intubated. Objective:   VITALS SIGNS:    Visit Vitals  BP (!) 86/53   Pulse 86   Temp 98.4 °F (36.9 °C)   Resp 20   Ht 5' 6\" (1.676 m)   Wt 68 kg (149 lb 14.6 oz)   SpO2 95%   BMI 24.20 kg/m²       O2 Device: Endotracheal tube, Heated, Ventilator   O2 Flow Rate (L/min): 20 l/min   Temp (24hrs), Av.8 °F (36 °C), Min:93.6 °F (34.2 °C), Max:99 °F (37.2 °C)         PHYSICAL EXAM:  NAD  +ETT  Trace ankle edema  Vasquez  sedated    DATA REVIEW:     INTAKE / OUTPUT:   Last shift:       07 - 1900  In: 347.5 [I.V.:347.5]  Out: 543 [Urine:17; Drains:100]  Last 3 shifts:  190 -  0700  In: 4477.7 [I.V.:4367.7]  Out: 6242 [Urine:1049; Drains:585]    Intake/Output Summary (Last 24 hours) at 2023 1221  Last data filed at 2023 1200  Gross per 24 hour   Intake 3240.72 ml   Output 2966 ml   Net 274.72 ml           LABS:   Recent Labs     23  0408 23  1147 23  0146   WBC 3.3* 9.3 12.1*   HGB 7.9* 9.1* 11.0*   HCT 24.6* 29.8* 35.7*   PLT 49* 70* 101*       Recent Labs     23  0408 23  1644 23  1147 23  0617 23  0146    145 150* 145  --    K 3.1* 3.4* 4.6 5.0  --    * 113* 116* 117*  --    CO2 26 20* 18* 13*  --    GLU 92 152* 118* 97  --    BUN 37* 46* 56* 52*  --    CREA 1.93* 2.48* 2.95* 2.81*  --    CA 9.7 9.5 8.9 9.0  --    MG 2.1 2.4 2.5* 2.7* 2.8*   PHOS 2.4* 4.2  --  7.1*  --    ALB 3.7  --  3.1* 3.0* 3.2*   TBILI 10.3*  --  7.2* 8.6* 8.5*   *  --  145* 123* 61   INR 2.2* 3.0*  --   --  3.1*           Assessment:  TANNER on CKD-3a: Suspect cardiorenal effects with needed diuresis. Now has LVAD and need to watch for POST OP ATN. Ischemic CM: Recurrent exacerbations. EF 20%. S/p LVAD on 2/15    BPH     Well differentiated NET Grade 1: noted on EGD 23     Anemia 2 to CKD: improved    High Mg      Left UE basilic and radial vein thrombus    TANNER had resolved.  Now bumped to 1.8 to 2.14. non oliguric. [place] CHILDERS. Plan/Recommendations:    Seen on CVVHD. Factor 0. Change to 4K bags.       Pressors as needed    I&Os  Daily labs  Avoid nephrotoxins  Avoid magnesium containing supplements or laxatives    Discussed with RN

## 2023-02-19 NOTE — DIALYSIS
CRRT / [phone]    Orders   Mode: CVVHD rounding   Blood Flow Rate: 250 ml/min   Prismasol Dose: 25ml/kg/hr  DFR:    Prismasol Concentrate: 2K/3.5Ca   Blood Warmer Temp: 37*C   Net Fluid Removal: 0 ml/hr     Metrics   BP: 86/53   HR: 86   Access Pressure: -102   Filter Pressure: 128   Return Pressure: 56   TMP: 8   Pressure Drop: 56     Access   Type & Location: LIJ non-tunneled CVC   Comments: Dressing CDI dated 02/18/23. No redness, warmth or drainage noted. Labs   HBsAg (Antigen) / date: Negative (02/18/23)   HBsAb (Antibody) / date: Susceptible (02/18/23)   Source: [facility]     Safety:   Time Out Done:   (Time) 1043   Consent obtained/signed: Verified   Education: Intubated/sedated   Primary Nurse Rpt: REGGIE Mejia RN     Comments / Plan:   Consents, patient, code status, labs and orders verified. CVVHD rounding. [id number] filter running well with no indication for a change at this time. Lines visible and connections secure with blood warmer to return line at 37*C. Education & pre/post report given to primary RN.

## 2023-02-19 NOTE — PROGRESS NOTES
Occupational Therapy  2/19/2023    Events of weekend noted. Pt re-intubated and now with Impella placement 2*persistent RV failure per ECHO on 2/18. Will hold and follow peripherally for OT. Caitlyn Chacon MS, OTR/L

## 2023-02-20 ENCOUNTER — TELEPHONE (OUTPATIENT)
Dept: CARDIOLOGY CLINIC | Age: 73
End: 2023-02-20

## 2023-02-20 NOTE — PROGRESS NOTES
Pharmacist Note - Vancomycin Dosing    Consult provided for this 71 y.o. male for indication of sepsis. Antibiotic regimen(s): vanc + zosyn + metronidazole  Patient on vancomycin PTA? NO - did receive x2 days for surgical ppx earlier this admission    Recent Labs     23  0437 23  0434 23  1502 23  0408 23  1644 23  1147   WBC 7.6  --   --  3.3*  --  9.3   CREA  --  2.45* 1.90* 1.93*   < > 2.95*   BUN  --  33* 31* 37*   < > 56*    < > = values in this interval not displayed. Frequency of BMP: daily  Height: 167.6 cm  Weight: 68.9 kg  Est CrCl: CVVHD restarting today  Temp (24hrs), Av.5 °F (37.5 °C), Min:97.7 °F (36.5 °C), Max:102.2 °F (39 °C)    Cultures:   blood: in process   sputum: NGTD    MRSA Swab ordered (if applicable)? N/A    The plan below is expected to result in a target range of Trough 10-15 mcg/mL    Therapy will be initiated with a loading dose of 1750 mg IV x 1 to be followed by a maintenance dose of 1000 mg (~14 mg/kg) IV every 24 hours. Will check a trough level tomorrow prior to first maintenance dose since patient has been on/off CRRT. Pharmacy to follow patient daily and order levels / make dose adjustments as appropriate.

## 2023-02-20 NOTE — PROGRESS NOTES
Cardiac Surgery Coordinator- Met with the family of Anival Trimble in the fourth floor waiting room. Provided clinical update and encouraged them to ask questions. Dr Nini Gao joined the update. Offered emotional support. Will continue to follow.

## 2023-02-20 NOTE — DIALYSIS
1000 AM:  spoke with CVICU, patient off CRRT since approximately 12 MN per intensivist for hemodynamic instability, per primary nurse waiting cardiac rounds to determine timing of restarting CRRT and will notify Cleve Mems as soon as decision is made.

## 2023-02-20 NOTE — PROGRESS NOTES
0800- bedside report and drips verified. Patient intubated (AC 14/370/70%/5), sedated (propfol, precedex, fentanyl), impella RP (P7), LVAD (4800/2.9/6.7/3). Jonathan Ray MD at bedside; will update MD Elizabeth and [facility] team will they round. 3862- vaso weaned off; MAP 80.    0930Levin JACOB Luevano and Amador Mcgee MD at bedside. Updated on drips, labs, no pedal pulses. Orders from Douglas Murphy NP at restart CRRT. Will inform Jesu Greenwood RN. 12- wife and brother at bedside. Questions and concerns addressed; emotional support provided. 2000 Holiday Lenard at bedside. 1125- CRRT restarted; MAP 78    1228- Orders from Douglas Murphy to wean levophed, then dobutamine to 5 mcg, then epi if able. 1450- suctioned tan thick secretions from patients mouth. Updated Amador Mcgee on findings. Orders to decrease tube feeds to 10 ml/hr and monitor. 1506- levophed weaned off; MAP 78.    1540- advanced NGT per Amador Mcgee. Will repeat KUB per protocol. 1832- restarted levophed; MAP 68    1845- Carlos MD's a bedside for handoff. Updated that levoped was restarted for MAP 68. Orders from Amador Mcgee to wean dobutamine before going up on levophed. MD Gurjit ordered ABG. 1842- dobutamine decreased to 5 mcg. 1848- results of ABG (pO2 253) shown to MD Gurjit; orders to decrease FiO2 to 50%. 1856- MAP dropping after dobutamine change. Increased back to 6 mcg. 1858- increased dobutamine to 7 mcg; MAP 68.    1941- levophed increased to 6 mcg; MAP 62.    2000- Bedside shift change report given to Alisson Kelly RN (oncoming nurse) by Stanton Teague RN (offgoing nurse). Report included the following information SBAR, Kardex, and Cardiac Rhythm VPaced .

## 2023-02-20 NOTE — DIALYSIS
CRRT / [phone]    Orders   Mode: CVVHD restarted @ 8650   Blood Flow Rate: 250 mL/min   Prismasol Dose: 25ml/kg/hr  (68kg)  DFR: 1700 ml/hr   Prismasol Concentrate: 2K/3.5Ca   Blood Warmer Temp: 37 *C   Net Fluid Removal: 0 ml/hr     Metrics   BP: 70/40 (art line)   HR: 86   Access Pressure: -56   Filter Pressure: 110   Return Pressure: 25   TMP: 25   Pressure Drop: 58     Access   Type & Location: LIJ non-tunneled CVC   Comments: CHG Transparent dressing CDI and dated 02/18/23. Each catheter limb disinfected for 60 seconds per limb with alcohol swabs and each dialysis CVC hub scrubbed with Prevantics for 15 seconds, followed by a 5 second dry time per Hospital P&P. Labs   HBsAg (Antigen) Julianna josé miguel: Neg (02/18/23)             HBsAb (Antibody) Julianna Dojosé miguel: Susceptible (02/18/23)   Source: [facility]     Safety:   Time Out Done: (Time) 2310   Consent obtained/signed: Verified   Education: Intubated/sedated   Primary Nurse Rpt: REGGIE Mancini RN     Comments / Plan:   Patient, code status, labs, and orders verified. Consent on chart. Time out complete. CVVHD restarted after Flow problem alarms. Primary, RN unable to rinse patient back. Blood loss ~165 ml. [id number] set-up, primed 1L NS, tested and running well. Lines visible and connections secure with blood warmer supported on return line, set at 37*C. Education & pre/post report to primary RN.

## 2023-02-20 NOTE — TELEPHONE ENCOUNTER
Called patient's wife to coordinate family LVAD training. Provided some times of availability. Patient's wife stated she will speak to her daughter and return call when she knows when her daughter can attend.

## 2023-02-20 NOTE — DIABETES MGMT
[facility]  DIABETES MANAGEMENT CONSULT    Consulted by  Porsha Steen MD   for advanced nursing evaluation and care for inpatient blood glucose management. Evaluation and Action Plan   Mariposa Osorio is a 71year old gentleman, with Type 2 Diabetes with a recent A1C of 7.7%, who is admitted with arrhythmias and acute on chronic HFrEF with failure to respond to inotrope support. He was discharged one week prior from a prolonged hospital stay for acute on chronic HF and LVAD work-up and discharged home on dobutamine with a lifevest.  Glucose control was tenuous during his previous admission s/t multiple co-morbidities, several tests/procedures requiring NPO status, waxing and waining oral intake. At this time glucose is impacted by:  Heart Failure with reduced EF 25-30%, LVAD implant  Vasopressor use  TANNER: GFR improving: Now 32  Oral intake     This admission, he required low basal doses but high bolus doses with meals to offset pre-prandial hyperglycemia. An insulin gtt has been used for glycemic control post-operatively and insulin rates have been erratic over the last 24h- especially with escalation of vasopressors in the last 24h. Please continue insulin gtt until vasopressors wean and insulin doses stabilize. Action Plan    Continue insulin gtt            Initial Presentation   Mariposa Osorio is a 71 y.o. male who presented to the ED 1/26/23 with lower extremity swelling and difficulty breathing. He had a prolonged hospital admission from 12/22/22-1/19/23 for acute on chronic systolic HF and LVAD work-up. He was discharged with home inotrope. LAB: , GFR 58, Trop 2003, BNP 4524  CXR: New diffuse bilateral increased interstitial markings, partially obscuring bilateral pulmonary nodules.      HX:   Past Medical History:   Diagnosis Date    CAD (coronary artery disease) 11/10/2016    NSTEMI & 2 stents    Deafness 10/28/2012    [facility] Elevated cholesterol     Hypertension     NSTEMI (non-ST elevated myocardial infarction) ([facility] 11/10/2016        INITIAL DX:   CHF (congestive heart failure) ([facility] [I50.9]     Current Treatment     TX: Milrinone, Amio. Specialty consultations: [facility], Cardiology, EP, GI     Hospital Course   Clinical progress has been complicated by:    : Admission. Rapid response for SVT- Given adenosine x2, amio bolus/gtt  : Advanced HFC consult. EP consult ordered. Intolerance of inotropic support. Cardiology consult. NSTEMI, heparin gtt started. Seen by EP: Continue amio. : Febrile: CT chest  shows diffuse focal opacities concerning for pneumonia. Obtain blood cultures, UA. Pathology report 23: well differentiated neuroendocrine tumor grade 1. EGD: Patchy erythema gastric body, biopsies done. 6 mm sessile polyp gastric body biopsied  : Oncology consult: Recommend multiphase CT of the abdomen and pelvis to evaluate for metastatic spread. Tachycardia and SOB, BiPAP overnight. 2/3: Accepted for VAD implant if renal fx improves per [facility]. CCU Transfer and swan placed  : PRBC transfusion   : With increased dyspnea. Doubtamine started  2/15: LVAD Implant  : Extubated. CRRT started. ECHO with persistent RV failure. OR for RV impella. Remained Intubated post-op  Diabetes History   Type 2 Diabetes  Ambulatory BG management provided by: PCP Marcin Gardner MD    Diabetes-related Medical History  Acute complications  Acute hyperglycemia  Neurological complications  Peripheral neuropathy  Microvascular disease  Nephropathy  Macrovascular disease  CAD  Other associated conditions                                       CHF     Diabetes Medication History  Key Antihyperglycemic Medications        Diabetes Medication History  Key Antihyperglycemic Medications               metFORMIN (GLUCOPHAGE) 500 mg tablet (Taking/) Take 1 Tablet by mouth two (2) times daily (with meals) for 30 days. glipiZIDE (GLUCOTROL) 5 mg tablet (Taking) Take 1 tablet by mouth twice daily    Januvia 50 mg tablet (Taking) Take 1 tablet by mouth once daily             Diabetes self-management practices:   Eating pattern                Eats 3 small meals daily  [x]         Breakfast                         2 Eggs, Coffee  [x]         Lunch                               Olpe  [x]         Dinner                              \"Lebanese Food\" Bread, rice, lentils   [x]         Bedtime                           COokie  [x]         Snacks                              Afternoon snack  [x]         Beverages                        Water, Coffee  Physical activity pattern                Sedentary   Monitoring pattern  Discharged last week with a Carolynn CGM and serum glucometer  7 day average Ba-9a: 129-164  9a-12: 243  Noon to 9p: 198-238  1 low BG in the last week overnight    Taking medications pattern  [x]         Consistent administration  [x]         Affordable  Social determinants of health impacting diabetes self-management practices   Concerned that you need to know more about how to stay healthy with diabetes  Overall evaluation:                 [x]         Achieving A1c target with drug therapy & self-care practices    Subjective     Objective   Physical exam  General Underweight [place] [facility] male in critical condition in CVICU. Intubated. LVAD. RV Impella, CVVHD  Neuro  Sedated   Vital Signs Visit Vitals  BP (!) 70/40   Pulse 86   Temp 100 °F (37.8 °C)   Resp (!) 0   Ht 5' 6\" (1.676 m)   Wt 69.2 kg (152 lb 8.9 oz)   SpO2 97%   BMI 24.62 kg/m²     Skin  Warm and dry. No acanthosis noted along neckline. Sternal surgical incision. Chest tubes. LVAD  Heart   Regular rate and rhythm.  No murmurs, rubs or gallops  Lungs  Clear to auscultation without rales or rhonchi  Extremities No foot wounds        Laboratory  Recent Labs     23  0437 23  0434 23  1502 23  0408 23  1644 23  1147   GLU  -- 164* 111* 92   < > 118*   AGAP  --  9 8 8   < > 16*   WBC 7.6  --   --  3.3*  --  9.3   CREA  --  2.45* 1.90* 1.93*   < > 2.95*   AST  --  459* 468* 634*  --  836*   ALT  --  82* 109* 120*  --  145*    < > = values in this interval not displayed. Factors impacting BG management  Factor Dose Comments   Nutrition:  Standard meals     Enteral feeds  Jevity      Heart Failure/NSTEMI/Ischemic cardiomyopathy/Arrhythmias  HeartMate 3 LVAD  Dobutamine   Epinephrine   Norepi  Vasop    Infection UTI/PNA  Urine Cx pending   IV antibiotics   Fevers    Other:   Kidney function TANNER on CKD:   Current GFR 27  CVVHD    Acidosis Lac 2.7      Blood glucose pattern    Significant diabetes-related events over the past 24-72 hours  A1C 7.7% 1/11/23  Pre-op: Basal: Lantus 6 units daily and Bolus: 10 units Humalog/meal  Insulin gtt:   2/15: 2.8 units  2/16: 32.8 units  2/17: 23.1 units  2/18: 19.9 units  2/19: 8.7 units  2/20: 10.4 units since MN      Gtts: Dobutamine, epi (5), norepi (7), vasopressin (0.01)      Assessment and Nursing Intervention   Nursing Diagnosis Risk for unstable blood glucose pattern   Nursing Intervention Domain 6028 Decision-making Support   Nursing Interventions Examined current inpatient diabetes/blood glucose control   Explored factors facilitating and impeding inpatient management  Explored corrective strategies with patient and responsible inpatient provider   Informed patient of rational for insulin strategy while hospitalized     Billing Code(s)   No charge    Before making these care recommendations, I personally reviewed the hospitalization record, including notes, laboratory & diagnostic data and current medications, and examined the patient at the bedside (circumstances permitting) before determining care. More than fifty (50) percent of the time was spent in patient counseling and/or care coordination.   Total minutes: 13    SLICK Arana  Diabetes Clinical Nurse Specialist  [facility] Diabetes [facility]  Access via [employer]

## 2023-02-20 NOTE — PROGRESS NOTES
1945: Bedside and Verbal shift change report given to Anurag Ramirez RN (oncoming nurse) by Chepe Stover RN (offgoing nurse). Report included the following information Intake/Output, MAR, Recent Results, and Cardiac Rhythm V. Paced    2000: gtts verified (dobutamine, proprofol, fentanyl, heparin, precedex, insulin). Vent settings reviewed (AC/VC 14 / 370 / 60% / 5)    2045: MAPs between 63-65. Spoke with intensivist. Orders to give albumin x1 (250mg)    2240: flow problem alarms on CRRT, unable to troubleshoot. Not able to rinse back patient. Jeniffer Gupta 1154 nurse paged. Updated Dr. Liset Robertson and discussed not being able to rinse back patient, and concern about marginal MAPs. Orders received to draw H&H and give another 250 of albumin    2245: Dialysis nurse at bedside. Set up replaced and primed. Pt restarted on CRRT    2330: Maps in 46s. Orders to start levophed gtt. 2340: Map: 49-50. Levophed gtt still not available. Restarted epi gtt at 4 mcg/min to help with BP support. 2345: Dr. Liset Robertson at bedside and updated on patient. Orders to stop CRRT for now to help with BP support. Propofol decreased from 40 mcg/kg/min to 30 mcg/kg/min to help increase BP. Fentanyl increased to 200 mcg/hr    0000: Pt MAPs in 70s. 0015: Maps starting to decline again; currently between 65-67.     0041: levophed gtt started at 4 mcg/min    0124: Maps: 62-63; Levo gtt increased to 7 mcg/min. Pt starting to breathe and fight over vent. O2 sats between 89-90%. Increased oral secretions noted. RT called to bedside and pt suctioned more. FiO2 increased to 70%. 0200: Dr. Liset Robertson at bedside. Pt update provided. 2mg versed given to help with agitation. Propofol gtt increased to 35 mcg/kg/min. Precedex increased to 1 mcg/kg/hr    0315: Pt with consistent high fevers (100.8-102 F). Spoke with intensivist. Orders to draw blood cultures. Prn tylenol given. 0400: morning labs completed.      9933: vaso gtt started at 0.01 units/min    0800: Bedside and Verbal shift change report given to Stanton Teague RN (oncoming nurse) by Alisson Kelly RN (offgoing nurse).  Report included the following information SBAR, Kardex, and Recent Results. ]

## 2023-02-20 NOTE — PROGRESS NOTES
Clinical Pharmacy Note: Metronidazole Dosing    Please note that the metronidazole dose for Tana Hosteller has been changed to 500 mg IV q12h per [facility]-approved protocol. Please contact the pharmacy with any questions.     Fco Rincon, PHARMD

## 2023-02-20 NOTE — PROGRESS NOTES
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 Jeovanny Grullon  C/b RV failure requiring Impella RP placed 23  C/b acute on chronic renal failure, requiring CRRT  C/b hepatic failure  C/b lactic acidosis  C/b septic shock   Patient was approved for LVAD implantation as destination therapy at [facility] 2/3/23; the following have been identified and d/w patient as relative concerns pertaining to LVAD candidacy: small body surface area, cachexia, BMI 18, malnutrition, frailty, advanced age, muscular deconditioning, PVD (fingertips), urinary retention requiring donnelly catheter, neuroendocrine tumor class 1 requiring treatment after LVAD, mild neurocognitive dysfunction, chronic kidney disease and hearing loss requiring hearing aid         RECOMMENDATIONS:  Continue Impella RP at P6, HM3 LVAD at 4800rpms  Repeat TTE tomorrow   Continue dobutamine 7 mcg/kg/min  Wean Epi as tolerated  Levophed to keep MAP > 65  CVP goal 12-15  No beta-blockers due to hypotension and RV conditioning prior to LVAD  Cannot tolerate ARB/ARNi due to hypotension and upcoming surgical procedure   Cannot tolerate spironolactone due to hyperkalemia  Cannot tolerate jardiance due to significant diuresis on smallest dose/contributed to IVVD  Previously discontinued corlanor due to SVT  Digoxin stopped d/t risk for toxicity with amio loading  Discontinued amio due to intermitted heart blocks under pacemaker  Nephrology consult appreciated, resume CRRT  Keep K+ >4 and Mg >2  Transfuse to keep hgb > 7  Perioperative antibiotics per protocol  Start broad spectrum abx  Pan culture pending   Hold ASA d/t thrombocytopenia  Warfarin 2mg tonight; monitor INR daily   Maintain heparin gtt until 2 days with consecutive therapeutic INR  Noted drop in Plts, HIT panel sent per intensivist- although suspicion is for consumptive thrombocytopenia; if HIT panel + or further drop in plt, then can switch to bivalrudin if INR not therapeutic  Continue TF   Continue bowel regimen   Timing of extubation per intensivist  D/w Dr. Tanya Giordano and bedside RN         INTERVAL HISTORY:  POD 5  Febrile overnight to 101  CRRT clotted overnight  More hypotensive  Acidosis improved  No low flow alarms  Maintaining good RV and LV flows  Making small amts urine again  LFTs downtrending  T Bili trending up  Lactic acid 1.7 today  PCT 8  LDH trending down with impella at p6  T-bili up today (suspect hemolysis contributing in addition to resolving ischemic vs congestive hepatopathy)       IMPRESSION:  Acute on chronic combined systolic/diastolic  heart failure  Stage D, NYHA class IV symptoms   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, LVEF 20% (by echo 1/2023) and 23% (by cMRI 1/3/23)  Cardiac MRI suggestive of ischemic cardiomyopathy  PYP equivocal  S/p HeartMate 3  LVAD-DT (2/15/23)  C/b RV failure requiring Impella RP (2/18/23)  C/b franco on CKD requiring CRRT  C/b liver dysfunction (ischemic vs congestive)  Coronary artery disease  CAD s/p CABG x 2: further disease best managed medically due to small vessel size   At risk of sudden cardiac death  Peripheral arterial disease  Bilateral hydronephrosis s/p donnelly  Cardiac risk factors:  HTN  HL  TRISTAN, STOP-BANG 4  DM2  CKD, stage 3  MOCA from 2/9 19/30, consistent with mild cognitive impairment  Hard of hearing  Gastric Neuroendocrine Tumor, elevated gastrin and chromogranin A  Septic shock, source pending           LIFE GOALS:  Lifestyle goals reviewed with the patient.   Patient's personal goals include: having a few more years with family  Important upcoming milestones or family events: None at this time   The patient identifies the following as important for living well: being at home, not being SOB              CARDIAC IMAGING:   Echo 2/19/23    Left Ventricle: Severely reduced left ventricular systolic function with a visually estimated EF of 20 - 25%. Not well visualized. Left ventricle size is normal. Increased wall thickness. Unable to assess wall motion. Abnormal diastolic function. LVAD is present. LVAD inflow cannula was not well visualized. Right Ventricle: Not well visualized. Right ventricle is mildly dilated. Moderately reduced systolic function. TAPSE is abnormal.    Mitral Valve: Severe annular calcification at the anterior and posterior leaflets of the mitral valve. Mild regurgitation. Left Atrium: Left atrium is dilated. Right Atrium: Right atrium is dilated. Technical qualifiers: Echo study was technically difficult and technically difficult with poor endocardial visualization. Echo 1/27/23    Left Ventricle: EF by visual approximation is 20%. Left ventricle is mildly dilated. Mitral Valve: Moderately thickened leaflet, at the anterior and posterior leaflets. Moderately calcified leaflet. Moderate regurgitation. Left Atrium: Left atrium is moderately dilated. Technical qualifiers: Echo study was technically difficult with poor endocardial visualization. Contrast used: Definity. Limited study, not all structures viewed     Echo 1/9/23   Left Ventricle: Severely reduced left ventricular systolic function with a visually estimated EF of 25 - 30%. Left ventricle size is normal. Mildly increased wall thickness. Echo 12/26/22    Left Ventricle: Severely reduced left ventricular systolic function with a visually estimated EF of 25 - 30%. Left ventricle size is normal. Normal wall thickness. There are regional wall motion abnormalities. Grade II diastolic dysfunction with increased LAP.     Right Ventricle: Moderately reduced systolic function. TAPSE is abnormal. TAPSE is 1.1 cm. Aortic Valve: Mild stenosis of the aortic valve. AV peak gradient is 13 mmHg. AV peak velocity is 1.8 m/s. Mitral Valve: Not well visualized. Moderate annular calcification at the posterior leaflet of the mitral valve. Mild to moderate regurgitation. Tricuspid Valve: Mildly elevated RVSP. Left Atrium: Left atrium is moderately dilated. 12/8/22    Left Ventricle: Moderately reduced left ventricular systolic function with a visually estimated EF of 35 - 40%. Severe hypokinesis of the following segments: mid anteroseptal, apical anterior, apical septal, apical inferior and apical lateral. Severe hypokinesis of the apex. Mitral Valve: Severely thickened leaflet, at the anterior and posterior leaflets. Severely calcified leaflet, at the anterior and posterior leaflets. Mild annular calcification of the mitral valve. Moderate regurgitation. Left Atrium: Left atrium is mildly dilated. Contrast used: Definity. limited study     EKG 12/22/22 ST, Biatria enlargement, marked ST abnormality     [facility] 12/6/22  1. Normal LVEDP  2. Severe native multivessel coronary artery disease  3. Patent LIMA to LAD and vein graft to distal RCA  4. Recurrent ISR in OM1 stent with now 60 to 70% restenosis  5. Recoil of left main and circumflex stent with now recurrent 40 to 50% stenosis. 6.  Progression of ostial left main disease now to about 60% stenosis  7. Progression of disease in jailed first marginal branch now with diffuse 90% stenosis  8.   High-grade stenosis in the mid to distal right potential femoral artery treated with 6 x 40 mm impact drug-coated balloon angioplasty to reduce the stenosis to less than 40%        HEMODYNAMICS:  RHC 1/9/23  PA 20/9, RA 3, PCWP 8, CI 1.8     CPEST too ill   6MW 300 feet       LVAD INTERROGATION:  Device interrogated in person  Device function normal, normal flow, no events  LVAD Pump Speed (RPM): 4800  Pump Flow (LPM): 3  PI (Pulsitility Index): 5.8  Power: 3   Test: Yes  Back Up  at Bedside & Labeled: Yes  Power Module Test: Yes  Driveline Site Care: No  Driveline Dressing: Clean, Dry, and Intact  Testing  Alarms Reviewed: Yes  Back up SC speed: 4800  Back up Low Speed Limit: 4400  Emergency Equipment with Patient?: Yes  Emergency procedures reviewed?: No  Drive line site inspected?: No  Drive line intergrity inspected?: Yes  Drive line dressing changed?: No    PHYSICAL EXAM:  Visit Vitals  BP (!) 70/40   Pulse 86   Temp 100 °F (37.8 °C)   Resp 18   Ht 5' 6\" (1.676 m)   Wt 152 lb 8.9 oz (69.2 kg)   SpO2 97% Comment: via blood gas   BMI 24.62 kg/m²     Physical Exam  Vitals and nursing note reviewed. Constitutional:       Appearance: He is ill-appearing. Interventions: He is sedated and intubated. Cardiovascular:      Rate and Rhythm: Normal rate and regular rhythm. Comments: LVAD Humm noted on auscultation   Pulmonary:      Effort: Pulmonary effort is normal. No respiratory distress. He is intubated. Breath sounds: Rhonchi present. Comments: Coarse lung sounds  Abdominal:      General: There is distension. Comments: Driveline exit site with dressing C/D/I   Musculoskeletal:      Right lower le+ Pitting Edema present. Left lower le+ Pitting Edema present. Comments: Left groin Impella in place, dressing C/D/I   Skin:     Capillary Refill: Capillary refill takes more than 3 seconds.            REVIEW OF SYSTEMS:  Review of Systems   Unable to perform ROS: Intubated           PAST MEDICAL HISTORY:  Past Medical History:   Diagnosis Date    CAD (coronary artery disease) 11/10/2016    NSTEMI & 2 stents    Deafness 10/28/2012    DM (diabetes mellitus) ([facility] [address]     Elevated cholesterol     Hypertension     NSTEMI (non-ST elevated myocardial infarction) ([facility] [address] 11/10/2016       PAST SURGICAL HISTORY:  Past Surgical History:   Procedure Laterality Date    COLONOSCOPY N/A 6/28/2018    COLONOSCOPY performed by Alphonso Pelayo MD at [facility] ENDOSCOPY    COLONOSCOPY N/A 1/18/2023    COLONOSCOPY performed by Derik Dunn MD at [facility] ENDOSCOPY    [address]  11/11/2016    2 stents       FAMILY HISTORY:  Family History   Problem Relation Age of Onset    Heart Disease Father     Heart Attack Father     Hypertension Mother     Elevated Lipids Brother     Elevated Lipids Brother     No Known Problems Sister     Elevated Lipids Brother     No Known Problems Son     No Known Problems Daughter     Anesth Problems Neg Hx        SOCIAL HISTORY:  Social History     Socioeconomic History    Marital status:    Tobacco Use    Smoking status: Never     Passive exposure: Never    Smokeless tobacco: Never   Vaping Use    Vaping Use: Never used   Substance and Sexual Activity    Alcohol use: Yes     Alcohol/week: 2.0 standard drinks     Types: 1 Cans of beer, 1 Shots of liquor per week     Comment: rarely    Drug use: No    Sexual activity: Yes     Social Determinants of Health     Financial Resource Strain: Medium Risk    Difficulty of Paying Living Expenses: Somewhat hard   Food Insecurity: Food Insecurity Present    Worried About Running Out of Food in the Last Year: Never true    Ran Out of Food in the Last Year: Often true       LABORATORY RESULTS:     Labs Latest Ref Rng & Units 2/20/2023 2/19/2023 2/19/2023 2/19/2023 2/19/2023 2/18/2023 2/18/2023   WBC 4.1 - 11.1 K/uL 7.6 - - - 3. 3(L) - 9.3   RBC 4.10 - 5.70 M/uL 2.93(L) - - - 2.90(L) - 3.37(L)   Hemoglobin 12.1 - 17.0 g/dL 8. 0(L) 7. 8(L) - - 7. 9(L) - 9. 1(L)   Hematocrit 36.6 - 50.3 % 25. 9(L) 24. 5(L) - - 24. 6(L) - 29. 8(L)   MCV 80.0 - 99.0 FL 88.4 - - - 84.8 - 88.4   Platelets 582 - 114 K/uL 54(L) - - - 49(LL) - 70(L)   Lymphocytes 12 - 49 % 9(L) - - - 16 - -   Monocytes 5 - 13 % 5 - - - 4(L) - -   Eosinophils 0 - 7 % 7 - - - 6 - -   Basophils 0 - 1 % 1 - - - 1 - -   Albumin 3.5 - 5.0 g/dL 3.8 - 3.5 - 3.7 - 3. 1(L)   Calcium 8.5 - 10.1 MG/DL 9.0 - 9.5 - 9.7 9.5 8.9   Glucose 65 - 100 mg/dL 164(H) - 111(H) - 92 152(H) 118(H)   BUN 6 - 20 MG/DL 33(H) - 31(H) - 37(H) 46(H) 56(H)   Creatinine 0.70 - 1.30 MG/DL 2.45(H) - 1.90(H) - 1.93(H) 2.48(H) 2.95(H)   Sodium 136 - 145 mmol/L 138 - 142 - 144 145 150(H)   Potassium 3.5 - 5.1 mmol/L 4.2 - 3.4(L) - 3. 1(L) 3.4(L) 4.6   TSH 0.36 - 3.74 uIU/mL - - - - - - -   PSA 0.01 - 4.0 ng/mL - - - - - - -   LDH 85 - 241 U/L 769(H) - - 874(H) 1,054(H) - 977(H)   Some recent data might be hidden     Lab Results   Component Value Date/Time    TSH 3.52 01/28/2023 05:26 AM    TSH 2.12 12/27/2022 02:36 PM    TSH 4.80 (H) 12/06/2022 03:53 AM    TSH 5.39 (H) 10/12/2022 09:10 AM    TSH 3.53 02/03/2022 11:47 AM    TSH 5.790 (H) 11/21/2019 04:45 PM    TSH 3.08 06/22/2018 01:53 PM    TSH 4.250 05/26/2015 09:43 AM       ALLERGY:  Allergies   Allergen Reactions    Ambien [Zolpidem] Other (comments)     Causes extreme confusion        CURRENT MEDICATIONS:    Current Facility-Administered Medications:     vasopressin (VASOSTRICT) 20 Units in 0.9% sodium chloride 100 mL infusion, 0-0.04 Units/min, IntraVENous, BRANDON, Dinh Patel MD, Stopped at 02/20/23 0918    piperacillin-tazobactam (ZOSYN) 3.375 g in 0.9% sodium chloride (MBP/ADV) 100 mL MBP, 3.375 g, IntraVENous, Q8H, Walker Aponte MD    vancomycin (VANCOCIN) 1750 mg in  ml infusion, 1,750 mg, IntraVENous, ONCE, Walker Aponte MD, Last Rate: 250 mL/hr at 02/20/23 0920, 1,750 mg at 02/20/23 0920    VANCOMYCIN INFORMATION NOTE, , Other, Rx Dosing/Monitoring, Walker Aponte MD    metroNIDAZOLE (FLAGYL) IVPB premix 500 mg, 500 mg, IntraVENous, Q12H, Walker Aponte MD, Last Rate: 100 mL/hr at 02/20/23 0930, 500 mg at 02/20/23 0930    white petrolatum-mineral oiL (STYE LUBRICANT) ointment, , Both Eyes, Q12H, Antonio STALEY NP, Given at 02/20/23 7888 bicarbonate dialysis (PRISMASOL) BG K 4/Ca 2.5 5000 ml solution, , Extracorporeal, DIALYSIS CONTINUOUS, Titus Bates III, MD, Last Rate: 2,000 mL/hr at 02/19/23 1756, New Bag at 02/19/23 1756    heparin (porcine) 1,000 unit/mL injection 1,700 Units, 1,700 Units, InterCATHeter, DIALYSIS PRN, 1,700 Units at 02/20/23 0040 **AND** heparin (porcine) 1,000 unit/mL injection 1,500 Units, 1,500 Units, InterCATHeter, DIALYSIS PRN, Jaky Nunez DO, 1,500 Units at 02/20/23 0037    propofol (DIPRIVAN) 10 mg/mL infusion, 0-50 mcg/kg/min, IntraVENous, TITRATE, Dmitry Oleary MD, Last Rate: 14.2 mL/hr at 02/20/23 0805, 35 mcg/kg/min at 02/20/23 0805    sodium bicarbonate (8.4%) 25 mEq in dextrose 5% 1,000 mL infusion, , Other, TITRATE, Adan Carrasco MD, Last Rate: 12 mL/hr at 02/18/23 1413, New Bag at 02/18/23 1413    fentaNYL (PF) 1,500 mcg/30 mL (50 mcg/mL) infusion, 0-200 mcg/hr, IntraVENous, TITRATE, Emma MOLINA MD, Last Rate: 4 mL/hr at 02/20/23 0810, 200 mcg/hr at 02/20/23 0810    balsam peru-castor oiL (VENELEX) ointment, , Topical, BID, Rhys Johnson MD, Given at 02/20/23 1293    niCARdipine (CARDENE) 50 mg in 0.9% sodium chloride 100 mL infusion, 0-15 mg/hr, IntraVENous, TITRATE, Rhys Johnson MD, Paused at 02/18/23 2315    acetaminophen (TYLENOL) solution 650 mg, 650 mg, Oral, Q4H PRN, Gianfranco Laureano MD, 650 mg at 02/20/23 0401    acetaminophen (TYLENOL) suppository 650 mg, 650 mg, Rectal, Q4H PRN, Gianfranco Laureano MD, 650 mg at 02/17/23 2013    HYDROmorphone (DILAUDID) injection 0.5 mg, 0.5 mg, IntraVENous, Q3H PRN, Emma MOLINA MD, 0.5 mg at 02/18/23 0554    HYDROmorphone (DILAUDID) injection 1 mg, 1 mg, IntraVENous, Q4H PRN, Emma MOLINA MD, 1 mg at 02/17/23 1708    heparin 25,000 units in D5W 250 ml infusion, 12-25 Units/kg/hr, IntraVENous, TITRATE, Adan Carrasco MD, Last Rate: 4.1 mL/hr at 02/20/23 0805, 6 Units/kg/hr at 02/20/23 0805    albumin human 25% (BUMINATE) solution 12.5 g, 12.5 g, IntraVENous, Q6H, Catrachita Santos MD, 12.5 g at 02/20/23 3573    Warfarin - MD/NP dosing, , Other, Rx Dosing/Monitoring, Catrachita Santos MD    bumetanide [facility]) injection 0.5 mg, 0.5 mg, IntraVENous, Q4H PRN, Catrachita Santos MD, 0.5 mg at 02/17/23 1431    insulin regular (MYXREDLIN, NOVOLIN, HUMULIN) 100 units/100 ml NS infusion (premix), 0-50 Units/hr, IntraVENous, TITRATE, Shaila, Lizette B, NP, Last Rate: 2.7 mL/hr at 02/20/23 1010, 2.7 Units/hr at 02/20/23 1010    glucose chewable tablet 16 g, 4 Tablet, Oral, PRN, Shaila, Lizette B, NP    glucagon (GLUCAGEN) injection 1 mg, 1 mg, IntraMUSCular, PRN, Shaila, Lizette B, NP    insulin lispro (HUMALOG) injection, , SubCUTAneous, TIDAC, Sheldon, Cathy, CNS    sodium chloride (NS) flush 5-40 mL, 5-40 mL, IntraVENous, Q8H, Shaila, Lizette B, NP, 10 mL at 02/20/23 0630    sodium chloride (NS) flush 5-40 mL, 5-40 mL, IntraVENous, PRN, Shaila, Lizette B, NP, 40 mL at 02/17/23 1818    naloxone (NARCAN) injection 0.4 mg, 0.4 mg, IntraVENous, PRN, Shaila, Lizette B, NP    mupirocin (BACTROBAN) 2 % ointment, , Both Nostrils, BID, Shaila, Lizette B, NP, Given at 02/20/23 0319    ELECTROLYTE REPLACEMENT NOTE: Nurse to review Serum Potassium and Magnesuim levels and Initiate Electrolyte Replacement Protocol as needed, 1 Each, Other, PRN, Shaila, Lizette B, NP    dextrose 10 % infusion 0-250 mL, 0-250 mL, IntraVENous, PRN, Shaila, Lizette B, NP, Last Rate: 100 mL/hr at 02/17/23 2055, 75 mL at 02/17/23 2055    acetaminophen (TYLENOL) tablet 650 mg, 650 mg, Oral, Q6H PRN, Lalo Lintonin B, NP, 650 mg at 02/17/23 0200    oxyCODONE IR (ROXICODONE) tablet 10 mg, 10 mg, Oral, Q4H PRN, Shaila, Lizette B, NP    ondansetron (ZOFRAN) injection 4 mg, 4 mg, IntraVENous, Q4H PRN, Lizette Linton, NP    albuterol-ipratropium (DUO-NEB) 2.5 MG-0.5 MG/3 ML, 3 mL, Nebulization, Q6H PRN, Lizette Linton, NP    [Held by provider] aspirin chewable tablet 81 mg, 81 mg, Oral, DAILY, Shaila, Lizette B, NP, 81 mg at 02/17/23 0942    chlorhexidine (PERIDEX) 0.12 % mouthwash 10 mL, 10 mL, Oral, BID, Shaila, Lizette B, NP, 10 mL at 02/20/23 3427    senna-docusate (PERICOLACE) 8.6-50 mg per tablet 1 Tablet, 1 Tablet, Oral, DAILY, Shaila, Lizette B, NP, 1 Tablet at 02/16/23 0907    polyethylene glycol (MIRALAX) packet 17 g, 17 g, Oral, DAILY, Shaila, Lizette B, NP, 17 g at 02/19/23 0840    bisacodyL (DULCOLAX) suppository 10 mg, 10 mg, Rectal, DAILY PRN, Shaila, Lizette B, NP    sucralfate (CARAFATE) tablet 1 g, 1 g, Oral, AC&HS, Shaila, Lizette B, NP, 1 g at 02/20/23 0730    0.45% sodium chloride infusion, 10 mL/hr, IntraVENous, CONTINUOUS, Pillo Montenegro MD, Last Rate: 10 mL/hr at 02/20/23 0805, 10 mL/hr at 02/20/23 0805    DOBUTamine (DOBUTREX) 500 mg/250 mL (2,000 mcg/mL) infusion, 0-10 mcg/kg/min, IntraVENous, TITRATE, France Johnson MD, Last Rate: 11.7 mL/hr at 02/20/23 0959, 7 mcg/kg/min at 02/20/23 0959    dexmedeTOMidine in 0.9 % NaCl (PRECEDEX) 400 mcg/100 mL (4 mcg/mL) infusion soln, 0.1-1.5 mcg/kg/hr, IntraVENous, TITRATE, France Johnson MD, Last Rate: 14.1 mL/hr at 02/20/23 0810, 1 mcg/kg/hr at 02/20/23 0810    0.9% sodium chloride infusion, 9 mL/hr, IntraVENous, CONTINUOUS, France Johnson MD, Last Rate: 11 mL/hr at 02/19/23 1958, 11 mL/hr at 02/19/23 1958    EPINEPHrine (ADRENALIN) 10 mg in 0.9% sodium chloride 250 mL infusion, 0-10 mcg/min, IntraVENous, TITRATE, Lizette Linton, NP, Last Rate: 7.5 mL/hr at 02/20/23 1013, 5 mcg/min at 02/20/23 1013    NOREPINephrine (LEVOPHED) 32,000 mcg in dextrose 5% 250 mL (128 mcg/mL) infusion, 0.5-16 mcg/min, IntraVENous, TITRATE, Lizette Linton, NP, Last Rate: 3.3 mL/hr at 02/20/23 0804, 7 mcg/min at 02/20/23 0804    PATIENT CARE TEAM:  Patient Care Team:  Marcin Gardner MD as PCP - General (Family Medicine)  Marcin Gardner MD as PCP - [facility]  Claire Jaime, Tina Huntley MD (Cardiovascular Disease Physician)  Iraj Sevilla MD (Gastroenterology)  Sonny Stovall MD (Cardiothoracic Surgery)  Suzette Mckeon MD (Cardiovascular Disease Physician)  Yeny Lee MD (Nephrology)  Dru Villa MD (Pulmonary Disease)  Junior Eda MD ([address] [place] [address] Vascular Surgery)     Thank you for allowing me to participate in this patient's care. Total Critical Care time spent:   60 minutes. There was no overlap with other services        Critical care was necessary to treat or prevent imminent or life threatening deterioration of the following conditions: cardiac failure, respiratory failure and CNS failure or compromise     Services Provided:  1. Telemetry review and 12 lead ECG interpretation  2. Hemodynamic interpretation, assessment, and management  3. Review and interpretation of CXR  4. Review and interpretation of lab values  5. Review and interpretation of microbiologic data and culture results  6. Review of medications and administration  7. Review and interpretation of nutrition requirements and management  8. Discussion of management withother consultants and services  9.  Clinical update to family members      Ludwig Hodgkins, NP   [address] [place] [address], [address]  Phone: [phone]

## 2023-02-20 NOTE — PROGRESS NOTES
Comprehensive Nutrition Assessment    Type and Reason for Visit: Reassess, Consult-Tube Feeding recommendation and management    Nutrition Recommendations/Plan:     -Increase Pericolace to bid    -Modify tube feeding:  Vital 1.5 @ 35 ml/hr with 3 packets Prosource daily and 30 ml water flush q 4 hr         Malnutrition Assessment:  Malnutrition Status: Moderate malnutrition (01/30/23 1232)    Context:  Acute illness     Findings of the 6 clinical characteristics of malnutrition:   Energy Intake:  75% or less of est energy req for 7 or more days  Weight Loss:  5% over one month     Body Fat Loss:  Mild body fat loss, Buccal region   Muscle Mass Loss:  Mild muscle mass loss, Clavicles (pectoralis & deltoids), Thigh (quadriceps)  Fluid Accumulation:  Mild, Extremities   Strength:  Not performed        Nutrition Assessment:    PMHx: CAD s/p PCI x 2, HFrEF with EF 25-30%, DM, HTN, hypercholesterolemia, Nstemi, CKD stage III. Admitted 1/26 d/t ongoing symptoms r/t his HFrEF and LVAD work-up. Tx to CVICU on 2/3 for [facility] placement and ongoing LVAD workup. Noted, as part of LVAD work-up PTA, pt underwent EGD with bx on 1/18/23 which path revealed well-differentiated neuroendocrine tumor. Oncology consulted, no absolute contraindications to LVAD. Nephrology following for TANNER on CKD 3. LVAD placed 2/15.    2/20: Pt extubated 2/17 but then had to be re-intubated 2/18 for placement of RVAD 2/18. Plan to start CVVHD 2/17 however pt too unstable ON; started post RVAD (Impella RP support device). Tube feeding ordered yesterday; RD consult placed. Liver failure with elevated T-bili (hemolysis and/or ischemic vs congestive hepatopathy). Mr Rupali Barfield has tolerated EN well thus far. Last BM however 2/14-bowel regimen is ordered. Recommend increasing Pericolace to bid since pt requiring a fair amount of Fentanyl. Will change formula to Vital 1.5 to avoid fiber and fat content (structured lipid 2/2 elevated bilirubin). New goal: Vital 1.5 @ 35 ml/hr with 3 packets Prosource daily; continue minimal water flush of 30 ml q 4 hr. This will provide 770 ml, 1320 calories, 96 gm protein, 42 gm fat, 43 mEq potassium and 940 ml free water (tube feeding/flush) per day to meet 88% protein needs. Tube feeding and propofol will meet 100% estimated energy needs (1695 calories per day). 2/17: pt had LVAD placed 2/15 and remains intubated. Spoke with RN yesterday and again today - no plans to start TF, working on extubation now. RN states OG is too small for tube feeds anyway and worries it would clog, PO meds held. Pt would need a DHT. Even if pt extubated, will be a slow progression of diet over weekend. DHT may need to be considered, but also with Carafate QID, tube feeds would need to be held at various points throughout the day which makes regimen more complicated, so would consider intermittent vs nocturnal feeds if needed. PO intake was improving pre-op, but overall still with significant wt loss and meeting moderate malnutrition status prior to surgery. Weight up as expected post-op (d/t fluid). Currently 150 lb, but was 123 lb on 2/13 standing scale when last seen by this service. Ve Observed 8.68 l/min, tMax:102.4 °F  [place] EEN ~1800 kcal, which is similar to current EEN. Therefore, will not adjust given likelihood of extubation soon. If TF needed, recommend 4 cartons of Nepro - giving 1 carton over 1 hour QID that are timed around the Carafate. 4 cartons of Nepro daily with 60 mL H2O flushes 4x/day provide 948 mL, 1706 kcal, 77 gm pro, 153 gm CHO, and 692+558=276 mL free H2O.     Nutritionally Significant Medications:  Amiodarone, Insulin drip, Epi @ 5, Propofol @ 14.2 ml/hr, Levophed @ 7, Fentanyl @ 200, Albumin, Miralax daily, Pericolace daily      Estimated Daily Nutrient Needs:  Energy Requirements Based On: Formula  Weight Used for Energy Requirements: Admission (54.4 kg)  Energy (kcal/day): 1675 ([place] 2003b)  Weight Used for Protein Requirements: Admission  Protein (g/day): 109 (2g/kg)  Method Used for Fluid Requirements: Standard renal  Fluid (ml/day): 2000 (CHF)    Nutrition Related Findings:   Edema: Generalized pitting, 2+ pitting BUE, BLE  Last BM: 02/14/23, Formed    Wounds: Deep tissue injury, Multiple (DTI to right heel, wound to left ankle, WOCN following)      Current Nutrition Therapies:  Diet: NPO  Nutrition Support: Jevity 1.5 @ 35 ml/hr with 30 ml water flush q 4 hr      Anthropometric Measures:  Height: 5' 6\" (167.6 cm)  Ideal Body Weight (IBW): 142 lbs (65 kg)  Admission Body Weight: 120 lb  Current Body Wt:  69.2 kg (152 lb 8.9 oz), 107.4 % IBW. Bed scale  Current BMI (kg/m2): 24.6        Weight Adjustment: No adjustment                 BMI Category: Underweight (BMI less than 22) age over 72    Wt Readings from Last 10 Encounters:   02/20/23 69.2 kg (152 lb 8.9 oz)   01/25/23 55.8 kg (123 lb)   01/23/23 54.9 kg (121 lb)   01/21/23 54.4 kg (120 lb)   01/20/23 52.2 kg (115 lb)   01/20/23 52.2 kg (115 lb)   01/19/23 53 kg (116 lb 13.5 oz)   12/19/22 58.5 kg (129 lb)   12/16/22 57.4 kg (126 lb 8.7 oz)   12/05/22 59 kg (130 lb)           Nutrition Diagnosis:   Inadequate oral intake related to impaired respiratory function, cardiac dysfunction as evidenced by intubation, nutrition support-enteral nutrition  Underweight related to inadequate protein-energy intake as evidenced by BMI    Nutrition Interventions:   Food and/or Nutrient Delivery: Modify tube feeding  Nutrition Education/Counseling: No recommendations at this time  Coordination of Nutrition Care: Continue to monitor while inpatient       Goals:  Previous Goal Met: No progress toward goal(s)  Goals:  Tolerate nutrition support at goal rate (in next 24 hr.)  Specify Other Goals: pt will be extubated in next 24 hours with diet advancement beyond clears within 48 hours and PO intake >/=50% of meals within 5-7 days thereafter    Nutrition Monitoring and Evaluation:   Behavioral-Environmental Outcomes: None identified  Food/Nutrient Intake Outcomes: Enteral nutrition intake/tolerance  Physical Signs/Symptoms Outcomes: Biochemical data, Constipation, Fluid status or edema, Weight, Nutrition focused physical findings, Skin    Discharge Planning:     Too soon to determine    Gerard Carter RD CNSC  Available via [address] [place] [address]

## 2023-02-20 NOTE — PROGRESS NOTES
Occupational Therapy  2/20/2023    Chart reviewed. Patient remains intubated and sedated, noted impella placement over the weekend. Will hold and continue to follow. Thank you.     Naty Dumont, OTEDILBERTO, OTR/L

## 2023-02-20 NOTE — PROGRESS NOTES
RENAL  PROGRESS NOTE        Subjective: Intubated. On CRRT. Febrile overnight. Remains on multiple vasopressors. Anuric. Objective:   VITALS SIGNS:    Visit Vitals  BP (!) 89/65 Comment: ARTERIAL LINE   Pulse 87   Temp (!) 95.5 °F (35.3 °C)   Resp 18   Ht 5' 6\" (1.676 m)   Wt 68.9 kg (152 lb)   SpO2 97%   BMI 24.53 kg/m²       O2 Device: Endotracheal tube, Heated, Ventilator   O2 Flow Rate (L/min): 20 l/min   Temp (24hrs), Av.2 °F (37.3 °C), Min:95.5 °F (35.3 °C), Max:102.2 °F (39 °C)         PHYSICAL EXAM:  NAD  +ETT  + LE edema  Vasquez  sedated    DATA REVIEW:     INTAKE / OUTPUT:   Last shift:       07 - 1900  In: 1696.6 [I.V.:1366.6]  Out: 549 [Urine:25; Drains:130]  Last 3 shifts: 1901 -  0700  In: 4113.5 [I.V.:3488.5]  Out: 3437 [Urine:474; Drains:640]    Intake/Output Summary (Last 24 hours) at 2023 1547  Last data filed at 2023 1500  Gross per 24 hour   Intake 3991.94 ml   Output 1673 ml   Net 2318.94 ml           LABS:   Recent Labs     23  0437 23  2251 23  0408 23  1147   WBC 7.6  --  3.3* 9.3   HGB 8.0* 7.8* 7.9* 9.1*   HCT 25.9* 24.5* 24.6* 29.8*   PLT 54*  --  49* 70*       Recent Labs     23  1410 23  0437 23  0434 23  1503 23  1502 23  1203 23  0408     --  138  --  142  --  144   K 3.6  --  4.2  --  3.4*  --  3.1*     --  105  --  108  --  110*   CO2 25  --  24  --  26  --  26   *  --  164*  --  111*  --  92   BUN 32*  --  33*  --  31*  --  37*   CREA 2.38*  --  2.45*  --  1.90*  --  1.93*   CA 8.4*  --  9.0  --  9.5  --  9.7   MG  --   --  2.3 2.1  --   --  2.1   PHOS  --   --  4.2 1.5*  --   --  2.4*   ALB 3.4*  --  3.8  --  3.5  --  3.7   TBILI 10.7*  --  11.5*  --  10.8*  --  10.3*   ALT 54  --  82*  --  109*  --  120*   INR  --  1.9*  --   --   --  1.8* 2.2*           Assessment:  TANNER on CKD-3a: Suspect cardiorenal effects with needed diuresis.  Now has LVAD and  POST OP ATN. Anuric->Requiring CRRT     Ischemic CM: Recurrent exacerbations. EF 20%. S/p LVAD on 2/15. Required Impella RP for RV support    Sepsis: Urosepsis? BPH     Well differentiated NET Grade 1: noted on EGD 1/18/23     Anemia 2 to CKD: hgb low but holding      Left UE basilic and radial vein thrombus    Thrombocytopenia       Plan/Recommendations:  Seen on CVVHD. Factor rate to remain at 0. Ct 4K Prismasol bags. Pressors -> wean as tolerated  IV Abx  Follow up cultures  I&Os  Daily labs  Avoid nephrotoxins  Avoid magnesium containing supplements or laxatives    Discussed with RN and Dr. Nataly Trejo. Will reach out to patient's son to update him later today.     Jadiel Galvan MD  NSPC

## 2023-02-20 NOTE — DIALYSIS
CRRT / [phone]    Orders   Mode: CVVHD restarted @1125   Blood Flow Rate: 250   Prismasol Dose: DRF 1700 (25 ml/kg/hr x 68 hb=9566)   Prismasol Concentrate: 4K/2.5ca   Blood Warmer Temp: 37   Net Fluid Removal: 0 at this time     Metrics   BP: 89/65   HR: 85   Access Pressure: -57   Filter Pressure: 100   Return Pressure: 53   TMP: 26   Pressure Drop: 15     Access   Type & Location: LIJ non tunneled   Comments:    Each catheter limb disinfected per p&p, caps removed, hubs disinfected per p&p, aspirated 5 ml each limb flushed easily, dressing clean, dry and intact last changed 02/18/23                                    Labs   HBsAg (Antigen) / date:    Negative 02/18/23                                       HBsAb (Antibody) / date: Susceptible 02/18/23   Source: IntelliFlo     Safety:   Time Out Done:   (Time) 1120   Consent obtained/signed: Verified in chart    Education: Lines visible   Primary Nurse Rpt Pre: Chris Bar RN    Primary Nurse Rpt Post: Chris Bar RN      Comments / Plan:    Labs, orders, consent and code status reviewed. Was notified by CVICU cardiology ready to restart CRRT. New filter [id number] primed and tested with 1 liter normal saline, initiated as ordered, running without difficulty, lines visible warmer on return line 37*C. Primary nurse at bedside.

## 2023-02-20 NOTE — PROGRESS NOTES
Physical Therapy  2/20/2023    Chart reviewed. Patient remains intubated,  sedated, on CVVH, and noted impella placement over the weekend for R ventricle support. Will hold and continue to follow. Thank you.     Janis Sifuentes, PT, DPT

## 2023-02-20 NOTE — PROGRESS NOTES
CRITICAL CARE NOTE      Name: Raghu Shah   : 1950   MRN: [id number]   Date: 2023      Reason for ICU Admission: Acute on chronic HFrEF, LVAD workup     ICU PROBLEM LIST   Acute on chronic HFrEF (20%) NYHA IIIb/IV (D) on home inotropic support, life vest  TANNER on CKD3  CHB post op  Aflutter w/ RVR  Acute on chronic hypoxemic respiratory failure  CAP  CAD  Gastric neuroendocrine tumor  Hx of LUE DVT  DM  PAD  TRISTAN  BPH w/ urinary retention requiring chronic donnelly    HISTORY OF PRESENT ILLNESS:   Pt is a 71 y.o M w/ PMH as noted above admitted to [facility] on  due to ongoing symptoms secondary to his HFrEF. Treated for possible CAP, and diuresed for acute on chronic HFrEF. Nephrology following for TANNER on CKD 3. AHF team recommended transfer to CVICU for [facility] placement and ongoing LVAD workup, with placement 2/15. Pt extubated , however with development of worsening renal dysfunction overnight and unable to tolerate CRRT so was reintubated and taken for Impella RP placement for right-sided support in a.m. of  and CRRT resumed. 24 HOUR EVENTS:   Remains intubated, sedated. Fever and hypotension overnight despite good Impella and LVAD support with appropriate flows, unable to tolerate CRRT due to hypotension. Increased vasopressor support with improved MAP. Repeat cultures obtained, given gentle IV resuscitation and antibiotics broadened. Remains on Impella P7 LVAD at 4800 with 3 L flow. Minimal urine output.     NEUROLOGICAL:    Propofol, Precedex, fentanyl gtt  Goal RASS 0 to -1  RASHAD, we will keep intubated until more hemodynamically stable  Delirium precautions  Encourage work w/ PT/OT once  extubated    PULMONOLOGY:   Lung protective ventilation  SBT when more hemodynamically stable  Monitor blood gas    CARDIOVASCULAR:   On Levophed, vasopressin, epinephrine, dobutamine support at this time  Trend lactic acid, hemodynamics, and wean above as tolerated  NICOM trend  Repeat echo this morning, follow results  Status post Impella RP placement, P-7  LVAD  3L @ 4800 RPM  Heparin for anticoagulation, Coumadin bridge  CVP goal around 15  Lct normalized post Impella and CRRT, trend  Monitor chest tube output  V  paced to 86 due to CHB, monitor for return of underlying rhythm  C/w ASA, statin  Goal euvolemia  Unable to tolerate BB, ARNI/ARB due to hypotension, aldactone held 2/2 elevated K     GASTROINTESTINAL:   NPO, on tube feeds  PPI     RENAL/ELECTROLYTE/FLUIDS:   Strict I/Os,  goal  euvolemia  Off CRRT most night, resume CRRT, maintain net even  Monitor for renal recovery  Nephrology following  Monitor RFP  Mg/Phos/K >2/3/4  Vasquez to remain in place    ENDOCRINE:   Insulin gtt while hemodynamically unstable  Resume SSI, Basal insulin when indicated  Hypoglycemic protocol  Glucose goal 120-180    HEMATOLOGY/ONCOLOGY:   Heparin gtt, low suspicion for HIT, follow HIT panel suspect thrombocytopenia likely secondary to shock and Impella  After discussion with heart failure service will continue heparin at this time however low threshold for transition to bival  Hemoglobin goal greater than 7, platelet goal greater than 50  EPO weekly  Gastric neuroendocrine tumor, well differentiated, good prognosis per onc.      ID/MICRO:   Blood cultures, respiratory cultures, UA  Started VANC, Zosyn, Flagyl  Trend procalcitonin    ICU DAILY CHECKLIST     Code Status:Full  DVT Prophylaxis:heparin  T/L/D: ETT, Impella PIV,  Vasquez,  V  wire,  valentin  x5, LVAD drive line  SUP: PPI  Diet: NPO  Activity Level:ad jose f  ABCDEF Bundle/Checklist Completed:Yes  Disposition: Stay in ICU  Multidisciplinary Rounds Completed:  yes  Patient/Family Updated: Yes    Tat[place]   2/3 Transferred to CVICU for [facility] placement  2/7 started on dobutamine as adjunct to milrinone  2/15 LVAD implant  2/16 extubated  2/18 Impella RP placement    SUBJECTIVE:     As noted above    Review of Systems:     Unable to perform due to patient intubated    OBJECTIVE:     Labs and Data: Reviewed 23  Medications: Reviewed 23  Imaging: Reviewed 23    General - sedated, intubated chronically ill appearing  HEENT- pupils equal, nystagmus, anicteric  Neuro - sedated, no focal deficit  CV - Paced,  VAD hum, post sternotomy, valentin  x5  Resp - rales b/l, NC  Abd - soft, distended, nontender, no guarding  MSK- intact, no sacral ulcer  Right femoral Impella, LVAD driveline in left chest    Visit Vitals  BP (!) 89/65 Comment: ARTERIAL LINE   Pulse 85   Temp 99.5 °F (37.5 °C)   Resp 18   Ht 5' 6\" (1.676 m)   Wt 68.9 kg (152 lb)   SpO2 97% Comment: via blood gas   BMI 24.53 kg/m²    O2 Flow Rate (L/min): 20 l/min O2 Device: Endotracheal tube, Heated, Ventilator Temp (24hrs), Av.5 °F (37.5 °C), Min:97.7 °F (36.5 °C), Max:102.2 °F (39 °C)    CVP (mmHg): 17 mmHg (23 1100)      Intake/Output:     Intake/Output Summary (Last 24 hours) at 2023 1137  Last data filed at 2023 1100  Gross per 24 hour   Intake 3802.12 ml   Output 1491 ml   Net 2311.12 ml       Imaging    23    ECHO ADULT FOLLOW-UP OR LIMITED 2023    Interpretation Summary    Left Ventricle: EF by visual approximation is 20%. Left ventricle is mildly dilated. Mitral Valve: Moderately thickened leaflet, at the anterior and posterior leaflets. Moderately calcified leaflet. Moderate regurgitation. Left Atrium: Left atrium is moderately dilated. Technical qualifiers: Echo study was technically difficult with poor endocardial visualization. Contrast used: Definity. Limited study, not all structures viewed    Signed by: Lon Hitchcock MD on 2023  4:39 PM       Pertinent imaging reviewed and interpreted as noted above    CRITICAL CARE DOCUMENTATION  I had a face to face encounter with the patient, reviewed and interpreted patient data including clinical events, labs, images, vital signs, I/O's, and examined patient.   I have discussed the case and the plan and management of the patient's care with the consulting services, the bedside nurses and the respiratory therapist.      NOTE OF PERSONAL INVOLVEMENT IN CARE   This patient has a high probability of imminent, clinically significant deterioration, which requires the highest level of preparedness to intervene urgently. I participated in the decision-making and personally managed or directed the management of the following life and organ supporting interventions that required my frequent assessment to treat or prevent imminent deterioration. I personally spent 60 minutes of critical care time. This is time spent at this critically ill patient's bedside actively involved in patient care as well as the coordination of care. This does not include any procedural time which has been billed separately. Walker Trejo MD  Staff [address] [facility]

## 2023-02-21 NOTE — PROGRESS NOTES
CRITICAL CARE NOTE      Name: Harsha Cedeno   : 1950   MRN: [id number]   Date: 2023      Reason for ICU Admission: Acute on chronic HFrEF, LVAD workup     ICU PROBLEM LIST   Acute on chronic HFrEF (20%) NYHA IIIb/IV (D) on home inotropic support, life vest  TANNER on CKD3  CHB post op  Aflutter w/ RVR  Acute on chronic hypoxemic respiratory failure  CAP  CAD  Gastric neuroendocrine tumor  Hx of LUE DVT  DM  PAD  TRISTAN  BPH w/ urinary retention requiring chronic donnelly    HISTORY OF PRESENT ILLNESS:   Pt is a 71 y.o M w/ PMH as noted above admitted to [facility] on  due to ongoing symptoms secondary to his HFrEF. Treated for possible CAP, and diuresed for acute on chronic HFrEF. Nephrology following for TANNER on CKD 3. AHF team recommended transfer to CVICU for [facility] placement and ongoing LVAD workup, with placement 2/15. Pt extubated , however with development of worsening renal dysfunction overnight and unable to tolerate CRRT so was reintubated and taken for Impella RP placement for right-sided support in a.m. of  and CRRT resumed. 24 HOUR EVENTS:   Remains intubated, sedated. No hypotension overnight. Stable Impella and LVAD support with appropriate flows. Continue vasopressor support. Remains on Impella P7 LVAD at 4800 with 3 L flow. CRRT  Minimal urine output.     NEUROLOGICAL:    Propofol, Precedex, fentanyl gtt  Goal RASS 0 to -1  RASHAD, continue intubation  Delirium precautions      PULMONOLOGY:   Lung protective ventilation  SBT when more hemodynamically stable  Monitor blood gas    CARDIOVASCULAR:   On Levophed, vasopressin, epinephrine, dobutamine   Wean as tolerated  Trend lactic acid, hemodynamics, and wean above as tolerated  NICOM trend  Echo: EF 20%-25%  Status post Impella RP placement, P-7  LVAD  3L @ 4800 RPM  Heparin for anticoagulation, Coumadin bridge  CVP goal 10-12  MAP goal greater than 70  Lct normalized post Impella and CRRT, trend  Monitor chest tube output  V  paced to 86 due to CHB, monitor for return of underlying rhythm  C/w ASA, statin  Goal fluids net negative      GASTROINTESTINAL:   Enteral feeds  PPI     RENAL/ELECTROLYTE/FLUIDS:   Strict I/Os,   Continue CRRT    -pull off 25 cc/hr of fluid  Monitor for renal recovery  Nephrology following  Monitor RFP  Mg/Phos/K >2/3/4  Vasquez to remain in place    ENDOCRINE:   Insulin gtt while hemodynamically unstable  Resume SSI, Basal insulin when indicated  Hypoglycemic protocol  Glucose goal 120-180    HEMATOLOGY/ONCOLOGY:   Heparin gtt, low suspicion for HIT, follow HIT panel suspect thrombocytopenia likely secondary to shock and Impella  After discussion with heart failure service will continue heparin at this time however low threshold for transition to bival  Hemoglobin goal greater than 7, platelet goal greater than 50  EPO weekly  Gastric neuroendocrine tumor, well differentiated, good prognosis per onc.      ID/MICRO:   Blood cultures, respiratory cultures, UA  Continue antibiotics    -Vanco, Zosyn, Flagyl  Procalcitonin 7.14    ICU DAILY CHECKLIST     Code Status:Full  DVT Prophylaxis:heparin  T/L/D: ETT, Impella PIV,  Vasquez,  V  wire,  valentin  x5, LVAD drive line  SUP: PPI  Diet: NPO  Activity Level:ad jose f  ABCDEF Bundle/Checklist Completed:Yes  Disposition: Stay in ICU  Multidisciplinary Rounds Completed:  yes  Patient/Family Updated: [address]   2/3 Transferred to CVICU for [facility] placement  2/7 started on dobutamine as adjunct to milrinone  2/15 LVAD implant  2/16 extubated  2/18 Impella RP placement    SUBJECTIVE:     As noted above    Review of Systems:     Unable to perform due to patient intubated    OBJECTIVE:     Labs and Data: Reviewed 02/21/23  Medications: Reviewed 02/21/23  Imaging: Reviewed 02/21/23    General - sedated, intubated chronically ill appearing  HEENT- pupils equal, nystagmus, anicteric  Neuro - sedated, no focal deficit  CV - Paced,  VAD hum, post sternotomy, valentin  x5  Resp - rales b/l, NC  Abd - soft, distended, nontender, no guarding  MSK- intact, no sacral ulcer  Right femoral Impella, LVAD driveline in left chest    Visit Vitals  BP (!) 94/53   Pulse 85   Temp 98.2 °F (36.8 °C)   Resp 19   Ht 5' 6\" (1.676 m)   Wt 70.4 kg (155 lb 3.3 oz)   SpO2 95%   BMI 25.05 kg/m²    O2 Flow Rate (L/min): 20 l/min O2 Device: Endotracheal tube, Ventilator Temp (24hrs), Av.9 °F (36.6 °C), Min:95.5 °F (35.3 °C), Max:100 °F (37.8 °C)    CVP (mmHg): 18 mmHg (23 0900)      Intake/Output:     Intake/Output Summary (Last 24 hours) at 2023 0942  Last data filed at 2023 0900  Gross per 24 hour   Intake 4546.1 ml   Output 3213 ml   Net 1333.1 ml       Imaging    23    ECHO ADULT FOLLOW-UP OR LIMITED 2023    Interpretation Summary    Left Ventricle: EF by visual approximation is 20%. Left ventricle is mildly dilated. Mitral Valve: Moderately thickened leaflet, at the anterior and posterior leaflets. Moderately calcified leaflet. Moderate regurgitation. Left Atrium: Left atrium is moderately dilated. Technical qualifiers: Echo study was technically difficult with poor endocardial visualization. Contrast used: Definity. Limited study, not all structures viewed    Signed by: Clive Carter MD on 2023  4:39 PM       Pertinent imaging reviewed and interpreted as noted above    CRITICAL CARE DOCUMENTATION  I had a face to face encounter with the patient, reviewed and interpreted patient data including clinical events, labs, images, vital signs, I/O's, and examined patient.   I have discussed the case and the plan and management of the patient's care with the consulting services, the bedside nurses and the respiratory therapist.      NOTE OF PERSONAL INVOLVEMENT IN CARE   This patient has a high probability of imminent, clinically significant deterioration, which requires the highest level of preparedness to intervene urgently. I participated in the decision-making and personally managed or directed the management of the following life and organ supporting interventions that required my frequent assessment to treat or prevent imminent deterioration. I personally spent 40 minutes of critical care time. This is time spent at this critically ill patient's bedside actively involved in patient care as well as the coordination of care. This does not include any procedural time which has been billed separately.     Anika Kim MD  Staff [facility]

## 2023-02-21 NOTE — PROGRESS NOTES
Day #2 of Vancomycin  Indication:  sepsis / VAP  Current regimen:  1000mg q24h  Abx regimen:  vanc + Zosyn + flagyl  ID Following ?: NO  Concomitant nephrotoxic drugs (requires more frequent monitoring): Loop diuretics and Vasopressors  Frequency of BMP?: daily    Recent Labs     23  0213 23  1410 23  0437 23  0434 23  1502 23  0408   WBC 5.6  --  7.6  --   --  3.3*   CREA 2.07* 2.38*  --  2.45*   < > 1.93*   BUN 26* 32*  --  33*   < > 37*    < > = values in this interval not displayed. Est CrCl: CVVHD  Temp (24hrs), Av.8 °F (36.6 °C), Min:95.5 °F (35.3 °C), Max:99.5 °F (37.5 °C)    Cultures:    blood: NGTD   sputum: NGTD   urine: in process   MRSA: in process    MRSA Swab ordered (if applicable)?  YES    Goal target range Trough 10-15 mcg/mL    Recent level history:  Date/Time Dose & Interval Measured Level (mcg/mL) Associated AUC/SHA Dose Adjustment     @ 0905 1750mg x1 13.8 (24 hrs p dose) N/A 1000mg q24h                                         Vancomycin trough after loading dose at goal. Will continue with 1000mg q24h maintenance dose     Plan: Continue current regimen

## 2023-02-21 NOTE — PROGRESS NOTES
Critical Care Note     Cardiac surgery was called for the evaluation and management of: Stage D heart failure, cardiogenic shock     The critical nature of the patient's condition includes the following: the patient is at imminent risk of clinical deterioration and death     Critical care diagnoses that are being treated:    Cardiogenic shock   RV failure    Cardiogenic Shock   Remains intubated and sedated  Continue , and Levophed  Still making urine  Lactic acid 1.5  Creatinine 1.8  LFTs 100's    RV failure  Weaning inhaled NO   Continues on Epi       Intake/Output Summary (Last 24 hours) at 2023 1255  Last data filed at 2023 1203  Gross per 24 hour   Intake 4440.58 ml   Output 3832 ml   Net 608.58 ml      Visit Vitals  BP (!) 94/53   Pulse 85   Temp 98.4 °F (36.9 °C)   Resp 21   Ht 5' 6\" (1.676 m)   Wt 155 lb 3.3 oz (70.4 kg)   SpO2 93%   BMI 25.05 kg/m²      CXR Results  (Last 48 hours)                 23 0447  XR CHEST PORT Final result    Impression:  Stable support apparatus. Stable bilateral lung opacities and small   pleural effusions. Narrative:  EXAM:  XR CHEST PORT       INDICATION: Impella surveillance. COMPARISON: Chest x-ray 2023. TECHNIQUE: Semiupright portable chest AP view       FINDINGS: The support devices and lines are stable. The cardiomediastinal   contours are stable. The pulmonary vasculature is within normal limits. There are stable bilateral lung opacities and small pleural effusions. There is   no pneumothorax. The bones and upper abdomen are stable. 23 0448  XR CHEST PORT Final result    Impression:  Stable support apparatus. Stable bilateral lung opacities and small   pleural effusions. Narrative:  EXAM:  XR CHEST PORT       INDICATION: Impella device. COMPARISON: none       TECHNIQUE: Portable AP supine chest view at 0718 hours       FINDINGS: The support devices and lines are stable.  The cardiomediastinal   contours are stable. The pulmonary vasculature is within normal limits. There are stable bilateral lung opacities and small pleural effusions. There is   no pneumothorax. The bones and upper abdomen are stable. LVAD   Pump Speed (RPM): 4800  Pump Flow (LPM): 3.3  PI (Pulsitility Index): 2.4  Power: 3.1   Test: No  Back Up  at Bedside & Labeled: Yes  Power Module Test: No  Driveline Site Care: No  Driveline Dressing: Clean, Dry, and Intact  Testing  Alarms Reviewed: Yes  Back up SC speed: 4800  Back up Low Speed Limit: 4400  Emergency Equipment with Patient?: Yes  Emergency procedures reviewed?: No  Drive line site inspected?: No  Drive line intergrity inspected?: Yes  Drive line dressing changed?: No       I personally spent the above critical care time. This is time spent at this critically ill patient's bedside / unit / floor actively involved in patient care as well as the coordination of care. This does not include any procedural time which has been billed separately. This does not include any NP/PA patient care time. I had a face to face encounter with the patient, reviewed and interpreted patient data including clinical events, labs, images, vital signs, I/O's, and examined patient. I have discussed the case and the plan and management of the patient's care with the consulting services and bedside nurses. This patient has a high probability of imminent, clinically significant deterioration, which requires the highest level of preparedness to intervene urgently. I participated in the decision-making and personally managed or directed the management of life and organ supporting interventions to treat or prevent imminent deterioration.     This patient has a critical illness or injury that is acutely impairing one or more vital organ systems such that there is a high probability of imminent or life threatening deterioration in the patient's condition. This patient requires high complexity medical decision making to assess, manipulate, and support vital organ system failure. After stabilization, this patient still requires management to prevent further life / organ threatening deterioration.

## 2023-02-21 NOTE — PROGRESS NOTES
Transitions of Care Plan    RUR: 26% - high  Clinical Update: LVAD - post op day 6; RP impella; multiple pressor support; CRRT; intubated  Consults: Multiple  Baseline: ambulates w RW; resides w wife  Barrier(s) to Discharge: medical  Disposition:   Home Health: [address]  Estimated Discharge Date: 2+ days    Clinical update per chart review and/or patient discussed during Interdisciplinary Rounds:    Patient is not medically stable for discharge due to ongoing medical needs. CM continues to follow treatment plan for medical progress and disposition needs.     Disposition:  Pending medical progress; pt remains critically ill    Patric Norris, [address]  Available via [address] [place] [address] or

## 2023-02-21 NOTE — PROGRESS NOTES
RENAL  PROGRESS NOTE        Subjective: Intubated. On CRRT. Afebrile. Remains on multiple vasopressors. S/p PRBC    Objective:   VITALS SIGNS:    Visit Vitals  BP (!) 94/53   Pulse 85   Temp 98.2 °F (36.8 °C)   Resp 19   Ht 5' 6\" (1.676 m)   Wt 70.4 kg (155 lb 3.3 oz)   SpO2 95%   BMI 25.05 kg/m²       O2 Device: Endotracheal tube, Ventilator   O2 Flow Rate (L/min): 20 l/min   Temp (24hrs), Av.8 °F (36.6 °C), Min:95.5 °F (35.3 °C), Max:99.5 °F (37.5 °C)         PHYSICAL EXAM:  Intubated/sedated  +ETT  + LE edema  Vasquez  sedated    DATA REVIEW:     INTAKE / OUTPUT:   Last shift:       07 - 1900  In: 820.4 [I.V.:262.9]  Out: 099 [Urine:5; Drains:80]  Last 3 shifts: 1901 -  0700  In: 6175.7 [I.V.:4521.9]  Out: 3658 [Urine:106; Drains:670]    Intake/Output Summary (Last 24 hours) at 2023 1028  Last data filed at 2023 1000  Gross per 24 hour   Intake 4210.03 ml   Output 3522 ml   Net 688.03 ml           LABS:   Recent Labs     23  0213 23  0437 23  2251 23  0408   WBC 5.6 7.6  --  3.3*   HGB 7.0* 8.0* 7.8* 7.9*   HCT 22.3* 25.9* 24.5* 24.6*   PLT 34* 54*  --  49*       Recent Labs     23  0213 23  1653 23  1410 23  0437 23  0434 23  1502 23  1203     --  140  --  138   < >  --    K 3.8  --  3.6  --  4.2   < >  --      --  108  --  105   < >  --    CO2 25  --  25  --  24   < >  --    *  --  117*  --  164*   < >  --    BUN 26*  --  32*  --  33*   < >  --    CREA 2.07*  --  2.38*  --  2.45*   < >  --    CA 8.6  --  8.4*  --  9.0   < >  --    MG 2.6* 2.4  --   --  2.3   < >  --    PHOS 2.2* 3.0  --   --  4.2   < >  --    ALB 3.3*  --  3.4*  --  3.8   < >  --    TBILI 11.3*  --  10.7*  --  11.5*   < >  --    ALT 32  --  54  --  82*   < >  --    INR 3.9*  --   --  1.9*  --   --  1.8*    < > = values in this interval not displayed.            Assessment:  TANNER on CKD-3a: Suspect cardiorenal effects with needed diuresis. Now has LVAD and  POST OP ATN. Anuric->Requiring CRRT     Ischemic CM: Recurrent exacerbations. EF 20%. S/p LVAD on 2/15. Required Impella RP for RV support    Sepsis: Urosepsis? BPH     Well differentiated NET Grade 1: noted on EGD 1/18/23     Anemia 2 to CKD: hgb subuoptimal. PRBC today. Left UE basilic and radial vein thrombus    Thrombocytopenia       Plan/Recommendations:  Seen on CVVHD. Factor rate-> can up to 25ml/hr. Ct 4K Prismasol bags.   Pressors -> wean as tolerated  IV Kphos  IV Abx  Follow up cultures  I&Os  Daily labs  Avoid nephrotoxins  Avoid magnesium containing supplements or laxatives    Discussed with CVICU RN and patient's son      Jadiel Galvan MD  [facility]

## 2023-02-21 NOTE — DIALYSIS
CRRT / [phone]    Orders   Mode: CVVHD rounding @ 0305   Blood Flow Rate: 250 mL/min   Prismasol Dose: 25ml/kg/hr  (68kg)  DFR: 1700 ml/hr   Prismasol Concentrate: 4K/2.5Ca   Blood Warmer Temp: 37 *C   Net Fluid Removal: 0 ml/hr     Metrics   BP: 94/53 (art line)   HR: 86   Access Pressure: -88   Filter Pressure: 132   Return Pressure: 57   TMP: 29   Pressure Drop: 40     Access   Type & Location: LIJ non-tunneled CVC   Comments: CHG Transparent dressing CDI and dated 02/18/23. Labs   HBsAg (Antigen) [facility] Stalls: Neg (02/18/23)             HBsAb (Antibody) [facility] Stalls: Susceptible (02/18/23)   Source: Vocation     Safety:   Time Out Done: (Time) 2310   Consent obtained/signed: Verified   Education: Intubated/sedated   Primary Nurse Rpt: REGGIE Fuller RN     Comments / Plan:   Patient, code status, labs, and orders verified. Consent on chart. Time out complete. HF 1000 running well at this time and no indication for filter change. Lines visible and connections secure with blood warmer supported on return line, set at 37*C. Education & pre/post report to primary RN.

## 2023-02-21 NOTE — PROGRESS NOTES
Occupational Therapy  2/21/2023    Chart reviewed. Patient is POD #6 following LVAD, with RP impella. Patient remains intubated/sedated and continues to require multiple pressors and CRRT support. Will continue to follow peripherally. Thank you.      MARJAN Valerio, OTR/L

## 2023-02-21 NOTE — PROGRESS NOTES
Critical Care Note      Cardiac surgery was called for the evaluation and management of: Stage D heart failure, cardiogenic shock      The critical nature of the patient's condition includes the following: the patient is at imminent risk of clinical deterioration and death      Critical care diagnoses that are being treated:     Cardiogenic shock   RV failure     Cardiogenic Shock   Remains intubated and sedated  Continues on  and Levophed  Still making urine  Lactic acid 2.6  Creatinine 2.5  LFTs 500's  Reviewed TTE - RV appears to be moving better   Discussed with ICU and heart failure teams      RV failure  Continues on RVAD support  Continues on Epi    Renal failure   Continues on CVVH  Not pulling factor yet       Intake/Output Summary (Last 24 hours) at 2023 1304  Last data filed at 2023 1203  Gross per 24 hour   Intake 4318.54 ml   Output 3703 ml   Net 615.54 ml      Visit Vitals  BP (!) 94/53   Pulse 85   Temp 98.4 °F (36.9 °C)   Resp 21   Ht 5' 6\" (1.676 m)   Wt 155 lb 3.3 oz (70.4 kg)   SpO2 93%   BMI 25.05 kg/m²      CXR Results  (Last 48 hours)                 23 0447  XR CHEST PORT Final result    Impression:  Stable support apparatus. Stable bilateral lung opacities and small   pleural effusions. Narrative:  EXAM:  XR CHEST PORT       INDICATION: Impella surveillance. COMPARISON: Chest x-ray 2023. TECHNIQUE: Semiupright portable chest AP view       FINDINGS: The support devices and lines are stable. The cardiomediastinal   contours are stable. The pulmonary vasculature is within normal limits. There are stable bilateral lung opacities and small pleural effusions. There is   no pneumothorax. The bones and upper abdomen are stable. 23 0448  XR CHEST PORT Final result    Impression:  Stable support apparatus. Stable bilateral lung opacities and small   pleural effusions.                Narrative:  EXAM:  XR CHEST PORT INDICATION: Impella device. COMPARISON: none       TECHNIQUE: Portable AP supine chest view at 0718 hours       FINDINGS: The support devices and lines are stable. The cardiomediastinal   contours are stable. The pulmonary vasculature is within normal limits. There are stable bilateral lung opacities and small pleural effusions. There is   no pneumothorax. The bones and upper abdomen are stable. LVAD   Pump Speed (RPM): 4800  Pump Flow (LPM): 3.3  PI (Pulsitility Index): 2.4  Power: 3.1   Test: No  Back Up  at Bedside & Labeled: Yes  Power Module Test: No  Driveline Site Care: No  Driveline Dressing: Clean, Dry, and Intact  Testing  Alarms Reviewed: Yes  Back up SC speed: 4800  Back up Low Speed Limit: 4400  Emergency Equipment with Patient?: Yes  Emergency procedures reviewed?: No  Drive line site inspected?: No  Drive line intergrity inspected?: Yes  Drive line dressing changed?: No       I personally spent the above critical care time. This is time spent at this critically ill patient's bedside / unit / floor actively involved in patient care as well as the coordination of care. This does not include any procedural time which has been billed separately. This does not include any NP/PA patient care time. I had a face to face encounter with the patient, reviewed and interpreted patient data including clinical events, labs, images, vital signs, I/O's, and examined patient. I have discussed the case and the plan and management of the patient's care with the consulting services and bedside nurses. This patient has a high probability of imminent, clinically significant deterioration, which requires the highest level of preparedness to intervene urgently. I participated in the decision-making and personally managed or directed the management of life and organ supporting interventions to treat or prevent imminent deterioration.     This patient has a critical illness or injury that is acutely impairing one or more vital organ systems such that there is a high probability of imminent or life threatening deterioration in the patient's condition. This patient requires high complexity medical decision making to assess, manipulate, and support vital organ system failure. After stabilization, this patient still requires management to prevent further life / organ threatening deterioration.

## 2023-02-21 NOTE — PROGRESS NOTES
Physical Therapy  2/21/2023    Chart reviewed. Patient remains intubated, sedated, and on CVVHD. Hold PT and will sign off at this time. Spoke with [facility] NP and team will reconsult PT when patient medically appropriate. Thank you.     Janis Sifuentes, PT, DPT

## 2023-02-21 NOTE — WOUND CARE
WOCN Note:     Follow up visit for right heel. Chart shows:  Admitted on 12/22/22. Admitted for sepsis, cardiogenic shock, respiratory failure. History of MI, CABG x3, DM, CHF. Assessment:   Intubated, sedated, on pressors; chest tube; CRRT post LVAD  2/15 and Impella 2/18    RN at bedside who reports unable to turn secondary to femoral line instability. Encouraged micro turns and putting Venelex on gloves and reaching under patient to apply. He reports being supine since yesterday. Resting on NARENDRA mattress with heels offloaded by pillows. 1. POA right heel deep tissue injury  3 x 3 x 0 cm  Area of non-blanching deep purple. Painted the heel with betadine. No purulence or redness    Wound Recommendations:    Venelex twice daily sacrum and cover with sacral foam dressing. Paint right heel with betadine daily and allow to air dry; use boot when in bed    PI Prevention:  Turn/reposition approximately every 2 hours  Offload heels with heels hanging off end of pillow at all times while in bed. Sacral Foam dressing: lift to assess regularly; change as needed. Discontinue if incontinence is frequently soiling dressing. Low Air Loss mattress: Use only flat sheet and one incontinence pad. No changes to relay to provider.      Transition of Care: Plan to follow weekly and as needed while admitted to hospital.      Adalgisa Dwyer, GALILEON, RN, [facility]  Certified Wound, Ostomy, Continence Nurse  office [phone]  Available via [facility]

## 2023-02-21 NOTE — PROGRESS NOTES
Critical Care Note      Cardiac surgery was called for the evaluation and management of: Stage D heart failure, cardiogenic shock      The critical nature of the patient's condition includes the following: the patient is at imminent risk of clinical deterioration and death      Critical care diagnoses that are being treated:     Cardiogenic shock   RV failure     Cardiogenic Shock   Remains intubated and sedated  Continues on  and Levophed  Still making urine  Lactic acid up to 2.2   Creatinine 2.1  LFTs 300's  Worried about impending renal failure  Considering renal consult      RV failure  Weaning inhaled NO   Continues on Epi       Intake/Output Summary (Last 24 hours) at 2023 1301  Last data filed at 2023 1203  Gross per 24 hour   Intake 4318.54 ml   Output 3703 ml   Net 615.54 ml      Visit Vitals  BP (!) 94/53   Pulse 85   Temp 98.4 °F (36.9 °C)   Resp 21   Ht 5' 6\" (1.676 m)   Wt 155 lb 3.3 oz (70.4 kg)   SpO2 93%   BMI 25.05 kg/m²      CXR Results  (Last 48 hours)                 23 0447  XR CHEST PORT Final result    Impression:  Stable support apparatus. Stable bilateral lung opacities and small   pleural effusions. Narrative:  EXAM:  XR CHEST PORT       INDICATION: Impella surveillance. COMPARISON: Chest x-ray 2023. TECHNIQUE: Semiupright portable chest AP view       FINDINGS: The support devices and lines are stable. The cardiomediastinal   contours are stable. The pulmonary vasculature is within normal limits. There are stable bilateral lung opacities and small pleural effusions. There is   no pneumothorax. The bones and upper abdomen are stable. 23 0448  XR CHEST PORT Final result    Impression:  Stable support apparatus. Stable bilateral lung opacities and small   pleural effusions. Narrative:  EXAM:  XR CHEST PORT       INDICATION: Impella device.        COMPARISON: none       TECHNIQUE: Portable AP supine chest view at 0718 hours       FINDINGS: The support devices and lines are stable. The cardiomediastinal   contours are stable. The pulmonary vasculature is within normal limits. There are stable bilateral lung opacities and small pleural effusions. There is   no pneumothorax. The bones and upper abdomen are stable. LVAD   Pump Speed (RPM): 4800  Pump Flow (LPM): 3.3  PI (Pulsitility Index): 2.4  Power: 3.1   Test: No  Back Up  at Bedside & Labeled: Yes  Power Module Test: No  Driveline Site Care: No  Driveline Dressing: Clean, Dry, and Intact  Testing  Alarms Reviewed: Yes  Back up SC speed: 4800  Back up Low Speed Limit: 4400  Emergency Equipment with Patient?: Yes  Emergency procedures reviewed?: No  Drive line site inspected?: No  Drive line intergrity inspected?: Yes  Drive line dressing changed?: No       I personally spent the above critical care time. This is time spent at this critically ill patient's bedside / unit / floor actively involved in patient care as well as the coordination of care. This does not include any procedural time which has been billed separately. This does not include any NP/PA patient care time. I had a face to face encounter with the patient, reviewed and interpreted patient data including clinical events, labs, images, vital signs, I/O's, and examined patient. I have discussed the case and the plan and management of the patient's care with the consulting services and bedside nurses. This patient has a high probability of imminent, clinically significant deterioration, which requires the highest level of preparedness to intervene urgently. I participated in the decision-making and personally managed or directed the management of life and organ supporting interventions to treat or prevent imminent deterioration.     This patient has a critical illness or injury that is acutely impairing one or more vital organ systems such that there is a high probability of imminent or life threatening deterioration in the patient's condition. This patient requires high complexity medical decision making to assess, manipulate, and support vital organ system failure. After stabilization, this patient still requires management to prevent further life / organ threatening deterioration.

## 2023-02-21 NOTE — PROGRESS NOTES
Bedside and Verbal shift change report given to [address] (oncoming nurse) by Elton Bassett (offgoing nurse). Report included the following information SBAR, OR Summary, and Intake/Output. 6371 Nephrologist at bedside, giving 4100 Jose David Hsu HF NP at bedside, will pull factor of 25/hr, stopping heparin will re-check INR w/ am labs    1325 Dr. Denisha Garcia attempted RVAD wean, unsuccessful today  OK for SAT today  Dr. Denisha Garcia notified of drainage from RP site, [place] for now, will pack w/ gauze and continue to monitor     1445 Propofol gtt paused for SAT    1615  Levo gtt off    1650 SAT: pt opening eyes, Gave thumbs up in each hand, nodding head yes/no to questions  Still very drowsy, Propofol gtt restarted at 10, precedex gtt @ 1, fentanyl gtt @100    Bedside and Verbal shift change report given to Elton Bassett (oncoming nurse) by Suzanna Waters RN (offgoing nurse). Report included the following information SBAR, Kardex, OR Summary, Intake/Output, and Cardiac Rhythm v-paced .

## 2023-02-21 NOTE — PROGRESS NOTES
2130: Dr. Maceo Dandy at bedside to discuss plan of care for patient. Plan: wean dobutamine as tolerated and titrate levo as needed to maintain MAP goal: 65-75.     2135: MAP: 68; dobutamine gtt titrated down to 6 mcg/kg/min    2220: MAP: 63. Levo gtt titrated to 7 mcg/min    2240: MAPs: 60-61; Levo titrated to 9 mcg/min    2245: NICOM Hemodynamic Monitoring     Visit Vitals  BP (!) 89/65 Comment: ARTERIAL LINE   Pulse 86   Temp 99 °F (37.2 °C)   Resp 21   Ht 5' 6\" (1.676 m)   Wt 68.9 kg (152 lb)   SpO2 94%   BMI 24.53 kg/m²         Baseline assessment:        CO 5.87        CI 3.3        SVI 39        SVV 18              2250: dobutamine gtt weaned down to 5 mcg/kg/min    0105: Pt MAPs: 72-73. Dobutamine gtt weaned to 4 mcg/kg/min    0130: MAPs; 75-76. Levo gtt titrated to 7 mcg/min    0300: platelets: 34; hgb: 7. Orders received to give 1 unit PRBs and 1 unit of platelets    4651: MAPs: 77-79. Levophed gtt titrated to 6 mcg/min    0330: platelets infusing    0415: MAPs: 75-80; dobutamine gtt titrated to 2 mcg/kg/min    0525: abgs drawn; pH: 7.42 / CO2: 35.1 / pO2: 110 / HCO3: 22.9    0530: platelet transfusion complete    0610: blood transfusion started    0633: Dr. Maceo Dandy and Dr. Caroline Jaffe at bedside for rounds. No new orders at this time    0800: Bedside and Verbal shift change report given to Lopez Huff RN (oncoming nurse) by Marshall Scott RN (offgoing nurse). Report included the following information SBAR, Kardex, ED Summary, OR Summary, Procedure Summary, Intake/Output, MAR, Recent Results, and Cardiac Rhythm v. paced .

## 2023-02-21 NOTE — PROGRESS NOTES
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 Neelima Bentley  C/b RV failure requiring Impella RP placed 23  C/b acute on chronic renal failure, requiring CRRT  C/b hepatic failure  C/b lactic acidosis  C/b septic shock   Patient was approved for LVAD implantation as destination therapy at [facility] 2/3/23; the following have been identified and d/w patient as relative concerns pertaining to LVAD candidacy: small body surface area, cachexia, BMI 18, malnutrition, frailty, advanced age, muscular deconditioning, PVD (fingertips), urinary retention requiring donnelly catheter, neuroendocrine tumor class 1 requiring treatment after LVAD, mild neurocognitive dysfunction, chronic kidney disease and hearing loss requiring hearing aid         RECOMMENDATIONS:  Continue Impella RP at P6, HM3 LVAD at 4800rpms  Will have Dr. Neelima Bentley review TTE from 23  Continue dobutamine 2 mcg/kg/min  Wean Epi as tolerated for CI > 2, CVP < 15  Levophed to keep MAP > 65  CVP goal 12-15  No beta-blockers due to hypotension and RV conditioning prior to LVAD  Cannot tolerate ARB/ARNi due to hypotension and upcoming surgical procedure   Cannot tolerate spironolactone due to hyperkalemia  Cannot tolerate jardiance due to significant diuresis on smallest dose/contributed to IVVD  Previously discontinued corlanor due to SVT  Digoxin stopped d/t risk for toxicity with amio loading  Discontinued amio due to intermitted heart blocks under pacemaker  Nephrology consult appreciated, resume CRRT factor 25  Keep K+ >4 and Mg >2  Transfuse to keep hgb > 7  Perioperative antibiotics per protocol  Continue broad spectrum abx  Pan culture pending   Hold ASA d/t thrombocytopenia  Hold coumadin tonight, INR goal 2-3  Hold heparin gtt for supratherapeutic INR  Continue TF   Continue bowel regimen   Daily dressing changes to Drive line exit site  Pulmonary hygiene   Timing of extubation per intensivist  D/w Dr. Valerie Sandoval and bedside RN         INTERVAL HISTORY:  POD 6  Afebrile overnight but on CRRT  No low flow alarms  Maintaining good RV and LV flows  LFTs downtrending  T Bili 11.3 today   Lactic acid WNL  PCT penidng   LDH trending down with impella at p6       IMPRESSION:  Acute on chronic combined systolic/diastolic  heart failure  Stage D, NYHA class IV symptoms   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, LVEF 20% (by echo 1/2023) and 23% (by cMRI 1/3/23)  Cardiac MRI suggestive of ischemic cardiomyopathy  PYP equivocal  S/p HeartMate 3  LVAD-DT (2/15/23)  C/b RV failure requiring Impella RP (2/18/23)  C/b franco on CKD requiring CRRT  C/b liver dysfunction (ischemic vs congestive)  Coronary artery disease  CAD s/p CABG x 2: further disease best managed medically due to small vessel size   At risk of sudden cardiac death  Peripheral arterial disease  Bilateral hydronephrosis s/p donnelly  Cardiac risk factors:  HTN  HL  TRISTAN, STOP-BANG 4  DM2  CKD, stage 3  MOCA from 2/9 19/30, consistent with mild cognitive impairment  Hard of hearing  Gastric Neuroendocrine Tumor, elevated gastrin and chromogranin A  Septic shock, source pending           LIFE GOALS:  Lifestyle goals reviewed with the patient.   Patient's personal goals include: having a few more years with family  Important upcoming milestones or family events: None at this time   The patient identifies the following as important for living well: being at home, not being SOB              CARDIAC IMAGING:   Echo 2/19/23    Left Ventricle: Severely reduced left ventricular systolic function with a visually estimated EF of 20 - 25%. Not well visualized. Left ventricle size is normal. Increased wall thickness. Unable to assess wall motion. Abnormal diastolic function. LVAD is present. LVAD inflow cannula was not well visualized. Right Ventricle: Not well visualized. Right ventricle is mildly dilated. Moderately reduced systolic function. TAPSE is abnormal.    Mitral Valve: Severe annular calcification at the anterior and posterior leaflets of the mitral valve. Mild regurgitation. Left Atrium: Left atrium is dilated. Right Atrium: Right atrium is dilated. Technical qualifiers: Echo study was technically difficult and technically difficult with poor endocardial visualization. Echo 1/27/23    Left Ventricle: EF by visual approximation is 20%. Left ventricle is mildly dilated. Mitral Valve: Moderately thickened leaflet, at the anterior and posterior leaflets. Moderately calcified leaflet. Moderate regurgitation. Left Atrium: Left atrium is moderately dilated. Technical qualifiers: Echo study was technically difficult with poor endocardial visualization. Contrast used: Definity. Limited study, not all structures viewed     Echo 1/9/23   Left Ventricle: Severely reduced left ventricular systolic function with a visually estimated EF of 25 - 30%. Left ventricle size is normal. Mildly increased wall thickness. Echo 12/26/22    Left Ventricle: Severely reduced left ventricular systolic function with a visually estimated EF of 25 - 30%. Left ventricle size is normal. Normal wall thickness. There are regional wall motion abnormalities. Grade II diastolic dysfunction with increased LAP. Right Ventricle: Moderately reduced systolic function. TAPSE is abnormal. TAPSE is 1.1 cm. Aortic Valve: Mild stenosis of the aortic valve. AV peak gradient is 13 mmHg. AV peak velocity is 1.8 m/s. Mitral Valve: Not well visualized. Moderate annular calcification at the posterior leaflet of the mitral valve. Mild to moderate regurgitation. Tricuspid Valve: Mildly elevated RVSP. Left Atrium: Left atrium is moderately dilated. 12/8/22    Left Ventricle: Moderately reduced left ventricular systolic function with a visually estimated EF of 35 - 40%. Severe hypokinesis of the following segments: mid anteroseptal, apical anterior, apical septal, apical inferior and apical lateral. Severe hypokinesis of the apex. Mitral Valve: Severely thickened leaflet, at the anterior and posterior leaflets. Severely calcified leaflet, at the anterior and posterior leaflets. Mild annular calcification of the mitral valve. Moderate regurgitation. Left Atrium: Left atrium is mildly dilated. Contrast used: Definity. limited study     EKG 12/22/22 ST, Biatria enlargement, marked ST abnormality     [facility] 12/6/22  1. Normal LVEDP  2. Severe native multivessel coronary artery disease  3. Patent LIMA to LAD and vein graft to distal RCA  4. Recurrent ISR in OM1 stent with now 60 to 70% restenosis  5. Recoil of left main and circumflex stent with now recurrent 40 to 50% stenosis. 6.  Progression of ostial left main disease now to about 60% stenosis  7. Progression of disease in jailed first marginal branch now with diffuse 90% stenosis  8.   High-grade stenosis in the mid to distal right potential femoral artery treated with 6 x 40 mm impact drug-coated balloon angioplasty to reduce the stenosis to less than 40%        HEMODYNAMICS:  RHC 1/9/23  PA 20/9, RA 3, PCWP 8, CI 1.8     CPEST too ill   6MW 300 feet       LVAD INTERROGATION:  Device interrogated in person  Device function normal, normal flow, no events  LVAD   Pump Speed (RPM): 4800  Pump Flow (LPM): 3  PI (Pulsitility Index): 4.4  Power: 3.1   Test: Yes  Back Up  at Bedside & Labeled: Yes  Power Module Test: Yes  Driveline Site Care: No  Driveline Dressing: Clean, Dry, and Intact  Testing  Alarms Reviewed: Yes  Back up SC speed: 4800  Back up Low Speed Limit: 4400  Emergency Equipment with Patient?: Yes  Emergency procedures reviewed?: No  Drive line site inspected?: No  Drive line intergrity inspected?: Yes  Drive line dressing changed?: No    PHYSICAL EXAM:  Visit Vitals  BP (!) 94/53   Pulse 85   Temp 98.2 °F (36.8 °C)   Resp 19   Ht 5' 6\" (1.676 m)   Wt 155 lb 3.3 oz (70.4 kg)   SpO2 95%   BMI 25.05 kg/m²     Physical Exam  Vitals and nursing note reviewed. Constitutional:       Appearance: He is ill-appearing. Interventions: He is sedated and intubated. Cardiovascular:      Rate and Rhythm: Normal rate and regular rhythm. Comments: LVAD Humm noted on auscultation   Pulmonary:      Effort: Pulmonary effort is normal. No respiratory distress. He is intubated. Breath sounds: Rhonchi present. Comments: Coarse lung sounds  Abdominal:      General: There is distension. Comments: Driveline exit site with dressing C/D/I   Musculoskeletal:      Right lower le+ Pitting Edema present. Left lower le+ Pitting Edema present. Comments: Left groin Impella in place, dressing C/D/I   Skin:     Capillary Refill: Capillary refill takes more than 3 seconds.            REVIEW OF SYSTEMS:  Review of Systems   Unable to perform ROS: Intubated           PAST MEDICAL HISTORY:  Past Medical History:   Diagnosis Date    CAD (coronary artery disease) 11/10/2016    NSTEMI & 2 stents    Deafness 10/28/2012    DM (diabetes mellitus) ([facility] [address]     Elevated cholesterol     Hypertension     NSTEMI (non-ST elevated myocardial infarction) ([facility] [address] 11/10/2016       PAST SURGICAL HISTORY:  Past Surgical History:   Procedure Laterality Date    COLONOSCOPY N/A 2018    COLONOSCOPY performed by Yazmin Garcia MD at [facility] ENDOSCOPY    COLONOSCOPY N/A 2023    COLONOSCOPY performed by Iraj Sevilla MD at [facility] ENDOSCOPY    [address]  2016    2 stents       FAMILY HISTORY:  Family History   Problem Relation Age of Onset    Heart Disease Father     Heart Attack Father     Hypertension Mother     Elevated Lipids Brother     Elevated Lipids Brother     No Known Problems Sister     Elevated Lipids Brother     No Known Problems Son     No Known Problems Daughter     Anesth Problems Neg Hx        SOCIAL HISTORY:  Social History     Socioeconomic History    Marital status:    Tobacco Use    Smoking status: Never     Passive exposure: Never    Smokeless tobacco: Never   Vaping Use    Vaping Use: Never used   Substance and Sexual Activity    Alcohol use: Yes     Alcohol/week: 2.0 standard drinks     Types: 1 Cans of beer, 1 Shots of liquor per week     Comment: rarely    Drug use: No    Sexual activity: Yes     Social Determinants of Health     Financial Resource Strain: Medium Risk    Difficulty of Paying Living Expenses: Somewhat hard   Food Insecurity: Food Insecurity Present    Worried About Running Out of Food in the Last Year: Never true    Ran Out of Food in the Last Year: Often true       LABORATORY RESULTS:     Labs Latest Ref Rng & Units 2/21/2023 2/20/2023 2/20/2023 2/20/2023 2/19/2023 2/19/2023 2/19/2023   WBC 4.1 - 11.1 K/uL 5.6 - 7.6 - - - -   RBC 4.10 - 5.70 M/uL 2.57(L) - 2.93(L) - - - -   Hemoglobin 12.1 - 17.0 g/dL 7. 0(L) - 8. 0(L) - 7. 8(L) - -   Hematocrit 36.6 - 50.3 % 22. 3(L) - 25. 9(L) - 24. 5(L) - -   MCV 80.0 - 99.0 FL 86.8 - 88.4 - - - -   Platelets 410 - 440 K/uL 34(LL) - 54(L) - - - -   Lymphocytes 12 - 49 % 11(L) - 9(L) - - - -   Monocytes 5 - 13 % 6 - 5 - - - -   Eosinophils 0 - 7 % 7 - 7 - - - -   Basophils 0 - 1 % 1 - 1 - - - -   Albumin 3.5 - 5.0 g/dL 3. 3(L) 3.4(L) - 3.8 - 3.5 -   Calcium 8.5 - 10.1 MG/DL 8.6 8.4(L) - 9.0 - 9.5 -   Glucose 65 - 100 mg/dL 126(H) 117(H) - 164(H) - 111(H) -   BUN 6 - 20 MG/DL 26(H) 32(H) - 33(H) - 31(H) -   Creatinine 0.70 - 1.30 MG/DL 2.07(H) 2.38(H) - 2.45(H) - 1.90(H) -   Sodium 136 - 145 mmol/L 140 140 - 138 - 142 -   Potassium 3.5 - 5.1 mmol/L 3.8 3.6 - 4.2 - 3.4(L) -   TSH 0.36 - 3.74 uIU/mL - - - - - - -   PSA 0.01 - 4.0 ng/mL - - - - - - -   LDH 85 - 241 U/L 598(H) - - 769(H) - - 874(H)   Some recent data might be hidden     Lab Results   Component Value Date/Time    TSH 3.52 01/28/2023 05:26 AM    TSH 2.12 12/27/2022 02:36 PM    TSH 4.80 (H) 12/06/2022 03:53 AM    TSH 5.39 (H) 10/12/2022 09:10 AM    TSH 3.53 02/03/2022 11:47 AM    TSH 5.790 (H) 11/21/2019 04:45 PM    TSH 3.08 06/22/2018 01:53 PM    TSH 4.250 05/26/2015 09:43 AM       ALLERGY:  Allergies   Allergen Reactions    Ambien [Zolpidem] Other (comments)     Causes extreme confusion        CURRENT MEDICATIONS:    Current Facility-Administered Medications:     0.9% sodium chloride infusion 250 mL, 250 mL, IntraVENous, PRN, Edenilson Cagle MD    0.9% sodium chloride infusion 250 mL, 250 mL, IntraVENous, PRN, Edenilson Cagle MD    potassium phosphate 20 mmol in 0.9% sodium chloride 250 mL infusion, , IntraVENous, ONCE, Jadiel Galvan MD, Last Rate: 64.2 mL/hr at 02/21/23 0927, New Bag at 02/21/23 0927    vasopressin (VASOSTRICT) 20 Units in 0.9% sodium chloride 100 mL infusion, 0-0.04 Units/min, IntraVENous, TITRATE, Mary Patel MD, Stopped at 02/20/23 0918    piperacillin-tazobactam (ZOSYN) 3.375 g in 0.9% sodium chloride (MBP/ADV) 100 mL MBP, 3.375 g, IntraVENous, Q8H, Walker Aponte MD, Last Rate: 25 mL/hr at 02/21/23 0513, 3.375 g at 02/21/23 0513    Vancomycin - pharmacy to dose, , Other, Rx Dosing/Monitoring, Lindsay Daily, Walker MOLINA MD    metroNIDAZOLE (FLAGYL) IVPB premix 500 mg, 500 mg, IntraVENous, Q12H, Agnes MOLINA MD, Last Rate: 100 mL/hr at 02/21/23 0828, 500 mg at 02/21/23 0828    vancomycin (VANCOCIN) 1,000 mg in 0.9% sodium chloride 250 mL ([id number]), 1,000 mg, IntraVENous, Q24H, Walker Aponte MD    white petrolatum-mineral oiL (STYE LUBRICANT) ointment, , Both Eyes, Q12H, Trisha STALEY NP, Given at 02/21/23 0833    bicarbonate dialysis (PRISMASOL) BG K 4/Ca 2.5 5000 ml solution, , Extracorporeal, DIALYSIS CONTINUOUS, Jadiel Galvan MD, Last Rate: 1,700 mL/hr at 02/21/23 0830, New Bag at 02/21/23 0830    heparin (porcine) 1,000 unit/mL injection 1,700 Units, 1,700 Units, InterCATHeter, DIALYSIS PRN, 1,700 Units at 02/20/23 0040 **AND** heparin (porcine) 1,000 unit/mL injection 1,500 Units, 1,500 Units, InterCATHeter, DIALYSIS PRN, Lynsey Singh, DO, 1,500 Units at 02/20/23 0037    propofol (DIPRIVAN) 10 mg/mL infusion, 0-50 mcg/kg/min, IntraVENous, TITRATE, Mary Campuzano MD, Last Rate: 12.2 mL/hr at 02/21/23 0834, 30 mcg/kg/min at 02/21/23 0834    sodium bicarbonate (8.4%) 25 mEq in dextrose 5% 1,000 mL infusion, , Other, TITRATE, Terence Alvares MD, Last Rate: 7.3 mL/hr at 02/21/23 0457, New Bag at 02/21/23 0457    fentaNYL (PF) 1,500 mcg/30 mL (50 mcg/mL) infusion, 0-200 mcg/hr, IntraVENous, TITRATE, Tess MOLINA MD, Last Rate: 2 mL/hr at 02/21/23 0834, 100 mcg/hr at 02/21/23 0834    balsam peru-castor oiL (VENELEX) ointment, , Topical, BID, Fiser, Darryle Mulders, MD, Given at 02/21/23 0829    niCARdipine (CARDENE) 50 mg in 0.9% sodium chloride 100 mL infusion, 0-15 mg/hr, IntraVENous, TITRATE, Fiser, Darryle Mulders, MD, Paused at 02/18/23 2315    acetaminophen (TYLENOL) solution 650 mg, 650 mg, Oral, Q4H PRN, Janny Barrett MD, 650 mg at 02/20/23 0401    acetaminophen (TYLENOL) suppository 650 mg, 650 mg, Rectal, Q4H PRN, Janny Barrett MD, 650 mg at 02/17/23 2013    HYDROmorphone (DILAUDID) injection 0.5 mg, 0.5 mg, IntraVENous, Q3H PRN, Tess MOLINA MD, 0.5 mg at 02/18/23 0554    HYDROmorphone (DILAUDID) injection 1 mg, 1 mg, IntraVENous, Q4H PRN, Tess MOLINA MD, 1 mg at 02/17/23 1708    heparin 25,000 units in D5W 250 ml infusion, 12-25 Units/kg/hr, IntraVENous, TITRATE, Terence Alvares MD, Last Rate: 2.7 mL/hr at 02/20/23 2004, 4 Units/kg/hr at 02/20/23 2004    albumin human 25% (BUMINATE) solution 12.5 g, 12.5 g, IntraVENous, Q6H, Mike Kirk MD, 12.5 g at 02/21/23 3784    Warfarin - MD/NP dosing, , Other, Rx Dosing/Monitoring, Mike Kirk MD    metanide [facility]) injection 0.5 mg, 0.5 mg, IntraVENous, Q4H PRN, Mike Kirk MD, 0.5 mg at 02/17/23 1431    insulin regular (MYXREDLIN, NOVOLIN, HUMULIN) 100 units/100 ml NS infusion (premix), 0-50 Units/hr, IntraVENous, TITRATE, Shaila, Lizette B, NP, Last Rate: 3.1 mL/hr at 02/21/23 0817, 3.1 Units/hr at 02/21/23 0817    glucose chewable tablet 16 g, 4 Tablet, Oral, PRN, Shaila, Lizette B, NP    glucagon (GLUCAGEN) injection 1 mg, 1 mg, IntraMUSCular, PRN, Shaila, Lizette B, NP    insulin lispro (HUMALOG) injection, , SubCUTAneous, TIDAC, Sheldon, Cathy, CNS    sodium chloride (NS) flush 5-40 mL, 5-40 mL, IntraVENous, Q8H, Shaila, Lizette B, NP, 10 mL at 02/21/23 0519    sodium chloride (NS) flush 5-40 mL, 5-40 mL, IntraVENous, PRN, Shaila, Lizette B, NP, 40 mL at 02/17/23 1818    naloxone (NARCAN) injection 0.4 mg, 0.4 mg, IntraVENous, PRN, Shaila, Lizette B, NP    ELECTROLYTE REPLACEMENT NOTE: Nurse to review Serum Potassium and Magnesuim levels and Initiate Electrolyte Replacement Protocol as needed, 1 Each, Other, PRN, Shaila, Lizette B, NP    dextrose 10 % infusion 0-250 mL, 0-250 mL, IntraVENous, PRN, Shaila, Lizette B, NP, Last Rate: 200 mL/hr at 02/20/23 2154, 50 mL at 02/20/23 2154    acetaminophen (TYLENOL) tablet 650 mg, 650 mg, Oral, Q6H PRN, Shaila, Lizette B, NP, 650 mg at 02/17/23 0200    oxyCODONE IR (ROXICODONE) tablet 10 mg, 10 mg, Oral, Q4H PRN, Shaila, Lizette B, NP    ondansetron (ZOFRAN) injection 4 mg, 4 mg, IntraVENous, Q4H PRN, Shaila, Lizette B, NP    albuterol-ipratropium (DUO-NEB) 2.5 MG-0.5 MG/3 ML, 3 mL, Nebulization, Q6H PRN, Shaila, Lizette B, NP    [Held by provider] aspirin chewable tablet 81 mg, 81 mg, Oral, DAILY, Shaila, Lizette B, NP, 81 mg at 02/17/23 0942    senna-docusate (PERICOLACE) 8.6-50 mg per tablet 1 Tablet, 1 Tablet, Oral, DAILY, Shaila, Lizette B, NP, 1 Tablet at 02/21/23 0828    polyethylene glycol (MIRALAX) packet 17 g, 17 g, Oral, DAILY, Shaila, Lizette B, NP, 17 g at 02/21/23 1612    bisacodyL (DULCOLAX) suppository 10 mg, 10 mg, Rectal, DAILY PRN, Shaila, Lizette B, NP    0.45% sodium chloride infusion, 10 mL/hr, IntraVENous, CONTINUOUS, Bobbette Sicard, MD, Last Rate: 10 mL/hr at 02/21/23 0758, 10 mL/hr at 02/21/23 0758    DOBUTamine (DOBUTREX) 500 mg/250 mL (2,000 mcg/mL) infusion, 0-10 mcg/kg/min, IntraVENous, TITRATE, Holland Johnson MD, Last Rate: 3.3 mL/hr at 02/21/23 0757, 2 mcg/kg/min at 02/21/23 0757    dexmedeTOMidine in 0.9 % NaCl (PRECEDEX) 400 mcg/100 mL (4 mcg/mL) infusion soln, 0.1-1.5 mcg/kg/hr, IntraVENous, TITRATE, Holland Johnson MD, Last Rate: 14.1 mL/hr at 02/21/23 0758, 1 mcg/kg/hr at 02/21/23 0758    0.9% sodium chloride infusion, 9 mL/hr, IntraVENous, CONTINUOUS, Holland Johnson MD, Last Rate: 11 mL/hr at 02/21/23 0800, 11 mL/hr at 02/21/23 0800    EPINEPHrine (ADRENALIN) 10 mg in 0.9% sodium chloride 250 mL infusion, 0-10 mcg/min, IntraVENous, TITRATE, Shaila Lizette B, NP, Last Rate: 7.5 mL/hr at 02/21/23 0757, 5 mcg/min at 02/21/23 0757    NOREPINephrine (LEVOPHED) 32,000 mcg in dextrose 5% 250 mL (128 mcg/mL) infusion, 0.5-16 mcg/min, IntraVENous, TITRATE, Shaila, Lizette B, NP, Last Rate: 1.9 mL/hr at 02/21/23 0909, 4 mcg/min at 02/21/23 2779    PATIENT CARE TEAM:  Patient Care Team:  Arthurine Kocher, MD as PCP - General (Family Medicine)  Arthurine Kocher, MD as PCP - [facility] Empaneled Provider  Daniel Carey MD (Cardiovascular Disease Physician)  Nitin Zaidi MD (Gastroenterology)  Linh Esqueda MD (Cardiothoracic Surgery)  Lashawn Escalera MD (Cardiovascular Disease Physician)  Aldo Hernandez MD (Nephrology)  Luis Antonio Yeboah MD (Pulmonary Disease)  Bobbette Sicard, MD ([address] [place] [address] Vascular Surgery)     Thank you for allowing me to participate in this patient's care. Total Critical Care time spent:   60 minutes. There was no overlap with other services        Critical care was necessary to treat or prevent imminent or life threatening deterioration of the following conditions: cardiac failure, respiratory failure and CNS failure or compromise     Services Provided:  1. Telemetry review and 12 lead ECG interpretation  2. Hemodynamic interpretation, assessment, and management  3. Review and interpretation of CXR  4. Review and interpretation of lab values  5. Review and interpretation of microbiologic data and culture results  6. Review of medications and administration  7. Review and interpretation of nutrition requirements and management  8. Discussion of management withother consultants and services  9.  Clinical update to family members      Mary Vail NP   [address] [place] [address], [address]  Phone: [phone]

## 2023-02-21 NOTE — PROGRESS NOTES
Critical Care Note      Cardiac surgery was called for the evaluation and management of: Stage D heart failure, cardiogenic shock      The critical nature of the patient's condition includes the following: the patient is at imminent risk of clinical deterioration and death      Critical care diagnoses that are being treated:     Cardiogenic shock   RV failure     Cardiogenic Shock   Remains intubated and sedated  Continues on  and Levophed  Still making urine  Lactic acid down to 1.8  Creatinine 2.1  LFTs 200's  Discussed with ICU and heart failure teams      RV failure  Continues on RVAD support  Continues on Epi  Will ramp RVAD down later this afternoon     Renal failure   Continues on CVVH  Pulling factor            Intake/Output Summary (Last 24 hours) at 2023 1307  Last data filed at 2023 1300  Gross per 24 hour   Intake 4456.04 ml   Output 3703 ml   Net 753.04 ml      Visit Vitals  BP (!) 94/53   Pulse 87   Temp 98.1 °F (36.7 °C)   Resp 20   Ht 5' 6\" (1.676 m)   Wt 155 lb 3.3 oz (70.4 kg)   SpO2 93%   BMI 25.05 kg/m²      CXR Results  (Last 48 hours)                 23 0447  XR CHEST PORT Final result    Impression:  Stable support apparatus. Stable bilateral lung opacities and small   pleural effusions. Narrative:  EXAM:  XR CHEST PORT       INDICATION: Impella surveillance. COMPARISON: Chest x-ray 2023. TECHNIQUE: Semiupright portable chest AP view       FINDINGS: The support devices and lines are stable. The cardiomediastinal   contours are stable. The pulmonary vasculature is within normal limits. There are stable bilateral lung opacities and small pleural effusions. There is   no pneumothorax. The bones and upper abdomen are stable. 23 0448  XR CHEST PORT Final result    Impression:  Stable support apparatus. Stable bilateral lung opacities and small   pleural effusions.                Narrative:  EXAM:  XR CHEST PORT INDICATION: Impella device. COMPARISON: none       TECHNIQUE: Portable AP supine chest view at 0718 hours       FINDINGS: The support devices and lines are stable. The cardiomediastinal   contours are stable. The pulmonary vasculature is within normal limits. There are stable bilateral lung opacities and small pleural effusions. There is   no pneumothorax. The bones and upper abdomen are stable. LVAD   Pump Speed (RPM): 4800  Pump Flow (LPM): 3.2  PI (Pulsitility Index): 4.5  Power: 3   Test: No  Back Up  at Bedside & Labeled: Yes  Power Module Test: No  Driveline Site Care: No  Driveline Dressing: Clean, Dry, and Intact  Testing  Alarms Reviewed: Yes  Back up SC speed: 4800  Back up Low Speed Limit: 4400  Emergency Equipment with Patient?: Yes  Emergency procedures reviewed?: No  Drive line site inspected?: No  Drive line intergrity inspected?: Yes  Drive line dressing changed?: No      I personally spent the above critical care time. This is time spent at this critically ill patient's bedside / unit / floor actively involved in patient care as well as the coordination of care. This does not include any procedural time which has been billed separately. This does not include any NP/PA patient care time. I had a face to face encounter with the patient, reviewed and interpreted patient data including clinical events, labs, images, vital signs, I/O's, and examined patient. I have discussed the case and the plan and management of the patient's care with the consulting services and bedside nurses. This patient has a high probability of imminent, clinically significant deterioration, which requires the highest level of preparedness to intervene urgently. I participated in the decision-making and personally managed or directed the management of life and organ supporting interventions to treat or prevent imminent deterioration.     This patient has a critical illness or injury that is acutely impairing one or more vital organ systems such that there is a high probability of imminent or life threatening deterioration in the patient's condition. This patient requires high complexity medical decision making to assess, manipulate, and support vital organ system failure. After stabilization, this patient still requires management to prevent further life / organ threatening deterioration.

## 2023-02-22 NOTE — PROCEDURES
[facility]  Bronchoscopy    Procedure: Therapeutic bronchoscopy. Indication: Mucus Plugging    Consent/Treatment: Emergent procedure. Anesthesia:   Patient on ventilator and receiving  Fentanyl , PCDX, and propofol    The following parameters were monitored: oxygen saturation, heart rate, blood pressure, respiratory rate, EKG, adequacy of pulmonary ventilation and response to care. Total physician intraservice time was 25 in    Procedure Details:   -- The bronchoscope was introduced through an endotracheal tube. -- The endotracheal tube was noted to have significant amounts of thick mucus. The distal tip of the endotracheal tube was noted to nearly fully obstruct intermittently with a large mobile mucus plug. This plug and most of the remaining mucus were successfully suctioned. The endotracheal tube was also noted to be approx 1cm from the shade. It was withdrawn to 4cm from the shade. -- The trachea and shade were completely inspected and were found to be normal.  -- The right-sided endobronchial anatomy was inspected only to the right mainstem and noted to be clear of mucus  -- The left-sided endobronchial anatomy was inspected and noted to be clear of mucus. The proximal airways have the impression of mild dynamic airway collapse. As the procedure was performed emergently, and therefore without consent, and the obstruction identified in the endotracheal tube, a complete bronchoscopy was not performed.      Specimens:   None    Complications: none    Estimated Blood Loss: None    Yaniv Grace MD

## 2023-02-22 NOTE — PROGRESS NOTES
[facility] in [place], [place]  Inpatient Progress Note      Patient name: Cale Payne  Patient : 1950  Patient MRN: [id number]  Consulting MD: Jaylene Bryant MD  Date of service: 23    REASON FOR REFERRAL:  Management of LVAD     PLAN OF CARE:  72 y/o male with likely combined non-ischemic and ischemic cardiomyopathy, LVEF 25-30%, stage D, NYHA class IV now s/p HM3 LVAD as DT 2/15/23 by Dr. Tania Bernabe  C/b RV failure requiring Impella RP placed 23  C/b acute on chronic renal failure, requiring CRRT  C/b hepatic failure  C/b lactic acidosis  C/b septic shock   Patient was approved for LVAD implantation as destination therapy at [facility] 2/3/23; the following have been identified and d/w patient as relative concerns pertaining to LVAD candidacy: small body surface area, cachexia, BMI 18, malnutrition, frailty, advanced age, muscular deconditioning, PVD (fingertips), urinary retention requiring donnelly catheter, neuroendocrine tumor class 1 requiring treatment after LVAD, mild neurocognitive dysfunction, chronic kidney disease and hearing loss requiring hearing aid         RECOMMENDATIONS:  Continue Impella RP at P7, HM3 LVAD at 4600rpms  Continue dobutamine 2 mcg/kg/min- will wean slowly   Wean Epi as tolerated for CI > 2, CVP < 15  Levophed to keep MAP > 65  CVP goal 12-15  No beta-blockers due to hypotension and RV conditioning prior to LVAD  Cannot tolerate ARB/ARNi due to hypotension and upcoming surgical procedure   Cannot tolerate spironolactone due to hyperkalemia  Cannot tolerate jardiance due to significant diuresis on smallest dose/contributed to IVVD  Previously discontinued corlanor due to SVT  Digoxin stopped d/t risk for toxicity with amio loading  Discontinued amio due to intermitted heart blocks under pacemaker  Nephrology consult appreciated, increase CRRT factor to 50  Keep K+ >4 and Mg >2  Transfuse to keep hgb > 7  Perioperative antibiotics per protocol  Continue broad spectrum abx  Blood cx NGTD  Sputum NGTD  Urine- yeast   Hold ASA d/t thrombocytopenia  Hold coumadin tonight, INR goal 2-3  Hold heparin gtt for supratherapeutic INR  Continue TF   Continue bowel regimen   Daily dressing changes to Drive line exit site  Pulmonary hygiene   Timing of extubation per intensivist  D/w Dr. Ryan Dixon and bedside RN         INTERVAL HISTORY:  POD 7  Afebrile overnight but on CRRT  No low flow alarms  Maintaining good RV and LV flows  LFTs downtrending  T Bili 11.7 today   Lactic acid WNL  PCT trending down   LDH trending down with impella at p7       IMPRESSION:  Acute on chronic combined systolic/diastolic  heart failure  Stage D, NYHA class IV symptoms   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, LVEF 20% (by echo 1/2023) and 23% (by cMRI 1/3/23)  Cardiac MRI suggestive of ischemic cardiomyopathy  PYP equivocal  S/p HeartMate 3  LVAD-DT (2/15/23)  C/b RV failure requiring Impella RP (2/18/23)  C/b franco on CKD requiring CRRT  C/b liver dysfunction (ischemic vs congestive)  Coronary artery disease  CAD s/p CABG x 2: further disease best managed medically due to small vessel size   At risk of sudden cardiac death  Peripheral arterial disease  Bilateral hydronephrosis s/p donnelly  Cardiac risk factors:  HTN  HL  TRISTAN, STOP-BANG 4  DM2  CKD, stage 3  MOCA from 2/9 19/30, consistent with mild cognitive impairment  Hard of hearing  Gastric Neuroendocrine Tumor, elevated gastrin and chromogranin A  Septic shock, source pending           LIFE GOALS:  Lifestyle goals reviewed with the patient.   Patient's personal goals include: having a few more years with family  Important upcoming milestones or family events: None at this time   The patient identifies the following as important for living well: being at home, not being SOB              CARDIAC IMAGING:   Echo 2/19/23    Left Ventricle: Severely reduced left ventricular systolic function with a visually estimated EF of 20 - 25%. Not well visualized. Left ventricle size is normal. Increased wall thickness. Unable to assess wall motion. Abnormal diastolic function. LVAD is present. LVAD inflow cannula was not well visualized. Right Ventricle: Not well visualized. Right ventricle is mildly dilated. Moderately reduced systolic function. TAPSE is abnormal.    Mitral Valve: Severe annular calcification at the anterior and posterior leaflets of the mitral valve. Mild regurgitation. Left Atrium: Left atrium is dilated. Right Atrium: Right atrium is dilated. Technical qualifiers: Echo study was technically difficult and technically difficult with poor endocardial visualization. Echo 1/27/23    Left Ventricle: EF by visual approximation is 20%. Left ventricle is mildly dilated. Mitral Valve: Moderately thickened leaflet, at the anterior and posterior leaflets. Moderately calcified leaflet. Moderate regurgitation. Left Atrium: Left atrium is moderately dilated. Technical qualifiers: Echo study was technically difficult with poor endocardial visualization. Contrast used: Definity. Limited study, not all structures viewed     Echo 1/9/23   Left Ventricle: Severely reduced left ventricular systolic function with a visually estimated EF of 25 - 30%. Left ventricle size is normal. Mildly increased wall thickness. Echo 12/26/22    Left Ventricle: Severely reduced left ventricular systolic function with a visually estimated EF of 25 - 30%. Left ventricle size is normal. Normal wall thickness. There are regional wall motion abnormalities. Grade II diastolic dysfunction with increased LAP. Right Ventricle: Moderately reduced systolic function. TAPSE is abnormal. TAPSE is 1.1 cm. Aortic Valve: Mild stenosis of the aortic valve. AV peak gradient is 13 mmHg. AV peak velocity is 1.8 m/s. Mitral Valve: Not well visualized.  Moderate annular calcification at the posterior leaflet of the mitral valve. Mild to moderate regurgitation. Tricuspid Valve: Mildly elevated RVSP. Left Atrium: Left atrium is moderately dilated. 12/8/22    Left Ventricle: Moderately reduced left ventricular systolic function with a visually estimated EF of 35 - 40%. Severe hypokinesis of the following segments: mid anteroseptal, apical anterior, apical septal, apical inferior and apical lateral. Severe hypokinesis of the apex. Mitral Valve: Severely thickened leaflet, at the anterior and posterior leaflets. Severely calcified leaflet, at the anterior and posterior leaflets. Mild annular calcification of the mitral valve. Moderate regurgitation. Left Atrium: Left atrium is mildly dilated. Contrast used: Definity. limited study     EKG 12/22/22 ST, Biatria enlargement, marked ST abnormality     C 12/6/22  1. Normal LVEDP  2. Severe native multivessel coronary artery disease  3. Patent LIMA to LAD and vein graft to distal RCA  4. Recurrent ISR in OM1 stent with now 60 to 70% restenosis  5. Recoil of left main and circumflex stent with now recurrent 40 to 50% stenosis. 6.  Progression of ostial left main disease now to about 60% stenosis  7. Progression of disease in jailed first marginal branch now with diffuse 90% stenosis  8.   High-grade stenosis in the mid to distal right potential femoral artery treated with 6 x 40 mm impact drug-coated balloon angioplasty to reduce the stenosis to less than 40%        HEMODYNAMICS:  RHC 1/9/23  PA 20/9, RA 3, PCWP 8, CI 1.8     CPEST too ill   6MW 300 feet       LVAD INTERROGATION:  Device interrogated in person  Device function normal, normal flow, no events  LVAD   Pump Speed (RPM): 4800  Pump Flow (LPM): 3.5  PI (Pulsitility Index): 4  Power: 3.1   Test: No  Back Up  at Bedside & Labeled: Yes  Power Module Test: No  Driveline Site Care: No  Driveline Dressing: Clean, Dry, and Intact  Testing  Alarms Reviewed: Yes  Back up SC speed: 4800  Back up Low Speed Limit: 4400  Emergency Equipment with Patient?: Yes  Emergency procedures reviewed?: Yes  Drive line site inspected?: No  Drive line intergrity inspected?: Yes  Drive line dressing changed?: No    PHYSICAL EXAM:  Visit Vitals  BP 90/65   Pulse 87   Temp 98.8 °F (37.1 °C)   Resp 19   Ht 5' 6\" (1.676 m)   Wt 157 lb 6.5 oz (71.4 kg)   SpO2 94%   BMI 25.41 kg/m²     Physical Exam  Vitals and nursing note reviewed. Constitutional:       Appearance: He is ill-appearing. Interventions: He is sedated and intubated. Cardiovascular:      Rate and Rhythm: Normal rate and regular rhythm. Comments: LVAD Humm noted on auscultation   Pulmonary:      Effort: Pulmonary effort is normal. No respiratory distress. He is intubated. Breath sounds: Rhonchi present. Comments: Coarse lung sounds  Abdominal:      General: Bowel sounds are decreased. There is distension. Comments: Driveline exit site with dressing C/D/I   Musculoskeletal:      Right lower le+ Pitting Edema present. Left lower le+ Pitting Edema present. Comments: Left groin Impella in place, dressing C/D/I   Skin:     Capillary Refill: Capillary refill takes more than 3 seconds.            REVIEW OF SYSTEMS:  Review of Systems   Unable to perform ROS: Intubated           PAST MEDICAL HISTORY:  Past Medical History:   Diagnosis Date    CAD (coronary artery disease) 11/10/2016    NSTEMI & 2 stents    Deafness 10/28/2012    DM (diabetes mellitus) ([facility] [address]     Elevated cholesterol     Hypertension     NSTEMI (non-ST elevated myocardial infarction) ([facility] [address] 11/10/2016       PAST SURGICAL HISTORY:  Past Surgical History:   Procedure Laterality Date    COLONOSCOPY N/A 2018    COLONOSCOPY performed by Reinaldo Cota MD at [facility] ENDOSCOPY    COLONOSCOPY N/A 2023    COLONOSCOPY performed by Chris Robertson MD at [address] [place] [address]  2016    2 stents FAMILY HISTORY:  Family History   Problem Relation Age of Onset    Heart Disease Father     Heart Attack Father     Hypertension Mother     Elevated Lipids Brother     Elevated Lipids Brother     No Known Problems Sister     Elevated Lipids Brother     No Known Problems Son     No Known Problems Daughter     Anesth Problems Neg Hx        SOCIAL HISTORY:  Social History     Socioeconomic History    Marital status:    Tobacco Use    Smoking status: Never     Passive exposure: Never    Smokeless tobacco: Never   Vaping Use    Vaping Use: Never used   Substance and Sexual Activity    Alcohol use: Yes     Alcohol/week: 2.0 standard drinks     Types: 1 Cans of beer, 1 Shots of liquor per week     Comment: rarely    Drug use: No    Sexual activity: Yes     Social Determinants of Health     Financial Resource Strain: Medium Risk    Difficulty of Paying Living Expenses: Somewhat hard   Food Insecurity: Food Insecurity Present    Worried About Running Out of Food in the Last Year: Never true    Ran Out of Food in the Last Year: Often true       LABORATORY RESULTS:     Labs Latest Ref Rng & Units 2/22/2023 2/21/2023 2/21/2023 2/21/2023 2/20/2023 2/20/2023 2/20/2023   WBC 4.1 - 11.1 K/uL 8.7 - - 5.6 - 7.6 -   RBC 4.10 - 5.70 M/uL 2.58(L) - - 2.57(L) - 2.93(L) -   Hemoglobin 12.1 - 17.0 g/dL 7. 1(L) - 8. 2(L) 7. 0(L) - 8. 0(L) -   Hematocrit 36.6 - 50.3 % 22. 1(L) - 26. 1(L) 22. 3(L) - 25. 9(L) -   MCV 80.0 - 99.0 FL 85.7 - - 86.8 - 88.4 -   Platelets 023 - 072 K/uL 60(L) - - 34(LL) - 54(L) -   Lymphocytes 12 - 49 % 8(L) - - 11(L) - 9(L) -   Monocytes 5 - 13 % 9 - - 6 - 5 -   Eosinophils 0 - 7 % 9(H) - - 7 - 7 -   Basophils 0 - 1 % 1 - - 1 - 1 -   Albumin 3.5 - 5.0 g/dL 3.4(L) - - 3. 3(L) 3.4(L) - 3.8   Calcium 8.5 - 10.1 MG/DL 8.7 8.4(L) - 8.6 8.4(L) - 9.0   Glucose 65 - 100 mg/dL 120(H) 160(H) - 126(H) 117(H) - 164(H)   BUN 6 - 20 MG/DL 22(H) 24(H) - 26(H) 32(H) - 33(H)   Creatinine 0.70 - 1.30 MG/DL 1.81(H) 1.85(H) - 2.07(H) 2.38(H) - 2.45(H)   Sodium 136 - 145 mmol/L 139 138 - 140 140 - 138   Potassium 3.5 - 5.1 mmol/L 4.4 4.1 - 3.8 3.6 - 4.2   TSH 0.36 - 3.74 uIU/mL - - - - - - -   PSA 0.01 - 4.0 ng/mL - - - - - - -   LDH 85 - 241 U/L 573(H) - - 598(H) - - 769(H)   Some recent data might be hidden     Lab Results   Component Value Date/Time    TSH 3.52 01/28/2023 05:26 AM    TSH 2.12 12/27/2022 02:36 PM    TSH 4.80 (H) 12/06/2022 03:53 AM    TSH 5.39 (H) 10/12/2022 09:10 AM    TSH 3.53 02/03/2022 11:47 AM    TSH 5.790 (H) 11/21/2019 04:45 PM    TSH 3.08 06/22/2018 01:53 PM    TSH 4.250 05/26/2015 09:43 AM       ALLERGY:  Allergies   Allergen Reactions    Ambien [Zolpidem] Other (comments)     Causes extreme confusion        CURRENT MEDICATIONS:    Current Facility-Administered Medications:     0.9% sodium chloride infusion 250 mL, 250 mL, IntraVENous, PRN, Edenilson Cagle MD    0.9% sodium chloride infusion 250 mL, 250 mL, IntraVENous, PRN, Edenilson Cagle MD    vasopressin (VASOSTRICT) 20 Units in 0.9% sodium chloride 100 mL infusion, 0-0.04 Units/min, IntraVENous, TITRATE, Saul Patel MD, Stopped at 02/20/23 0918    piperacillin-tazobactam (ZOSYN) 3.375 g in 0.9% sodium chloride (MBP/ADV) 100 mL MBP, 3.375 g, IntraVENous, Q8H, Walker Aponte MD, Last Rate: 25 mL/hr at 02/22/23 0511, 3.375 g at 02/22/23 0511    Vancomycin - pharmacy to dose, , Other, Rx Dosing/Monitoring, Walker Montalvo MD    metroNIDAZOLE (FLAGYL) IVPB premix 500 mg, 500 mg, IntraVENous, Q12H, Na MOLINA MD, Last Rate: 100 mL/hr at 02/22/23 0840, 500 mg at 02/22/23 0840    vancomycin (VANCOCIN) 1,000 mg in 0.9% sodium chloride 250 mL (Mkkv1Mgs), 1,000 mg, IntraVENous, Q24H, Walker Aponte MD, Last Rate: 250 mL/hr at 02/21/23 1142, 1,000 mg at 02/21/23 1142    white petrolatum-mineral oiL (STYE LUBRICANT) ointment, , Both Eyes, Q12H, Cari STALEY NP, Given at 02/22/23 0840    bicarbonate dialysis (PRISMASOL) BG K 4/Ca 2.5 5000 ml solution, , Extracorporeal, DIALYSIS CONTINUOUS, Jadiel Galvan MD, Last Rate: 1,700 mL/hr at 02/22/23 0824, New Bag at 02/22/23 0824    heparin (porcine) 1,000 unit/mL injection 1,700 Units, 1,700 Units, InterCATHeter, DIALYSIS PRN, 1,700 Units at 02/20/23 0040 **AND** heparin (porcine) 1,000 unit/mL injection 1,500 Units, 1,500 Units, InterCATHeter, DIALYSIS PRN, Jarrell Mckinnon DO, 1,500 Units at 02/20/23 0037    propofol (DIPRIVAN) 10 mg/mL infusion, 0-50 mcg/kg/min, IntraVENous, TITRATE, Eva Harris MD, Stopped at 02/22/23 3030    sodium bicarbonate (8.4%) 25 mEq in dextrose 5% 1,000 mL infusion, , Other, TITRATE, Danuta Viveros MD, Last Rate: 6.6 mL/hr at 02/22/23 0755, Rate Verify at 02/22/23 0755    fentaNYL (PF) 1,500 mcg/30 mL (50 mcg/mL) infusion, 0-200 mcg/hr, IntraVENous, TITRATE, Miya MOLINA MD, Last Rate: 2 mL/hr at 02/22/23 0755, 100 mcg/hr at 02/22/23 0755    balsam peru-castor oiL (VENELEX) ointment, , Topical, BID, Master Johnson MD, Given at 02/22/23 0840    niCARdipine (CARDENE) 50 mg in 0.9% sodium chloride 100 mL infusion, 0-15 mg/hr, IntraVENous, TITRATE, Master Johnson MD, Paused at 02/18/23 2315    acetaminophen (TYLENOL) solution 650 mg, 650 mg, Oral, Q4H PRN, Kapil Pena MD, 650 mg at 02/20/23 0401    acetaminophen (TYLENOL) suppository 650 mg, 650 mg, Rectal, Q4H PRN, Kapil Pena MD, 650 mg at 02/17/23 2013    HYDROmorphone (DILAUDID) injection 0.5 mg, 0.5 mg, IntraVENous, Q3H PRN, Miya MOLINA MD, 0.5 mg at 02/18/23 0554    HYDROmorphone (DILAUDID) injection 1 mg, 1 mg, IntraVENous, Q4H PRN, Miya MOLINA MD, 1 mg at 02/17/23 1708    heparin 25,000 units in D5W 250 ml infusion, 12-25 Units/kg/hr, IntraVENous, TITRATE, Danuta Viveros MD, Stopped at 02/21/23 0945    albumin human 25% (BUMINATE) solution 12.5 g, 12.5 g, IntraVENous, Q6H, Danuta Viveros MD, 12.5 g at 02/22/23 8796    Warfarin - MD/NP dosing, , Other, Rx Dosing/Monitoring, Kvng Turner MD    [facility]) injection 0.5 mg, 0.5 mg, IntraVENous, Q4H PRN, Kvng Turner MD, 0.5 mg at 02/17/23 1431    insulin regular (MYXREDLIN, NOVOLIN, HUMULIN) 100 units/100 ml NS infusion (premix), 0-50 Units/hr, IntraVENous, TITRATE, Shaila, Lizette B, NP, Last Rate: 2 mL/hr at 02/22/23 0754, 2 Units/hr at 02/22/23 0754    glucose chewable tablet 16 g, 4 Tablet, Oral, PRN, Shaila, Lizette B, NP    glucagon (GLUCAGEN) injection 1 mg, 1 mg, IntraMUSCular, PRN, Shaila, Lizette B, NP    insulin lispro (HUMALOG) injection, , SubCUTAneous, TIDAC, Sheldon, Cathy, CNS    sodium chloride (NS) flush 5-40 mL, 5-40 mL, IntraVENous, Q8H, Shaila, Lizette B, NP, 10 mL at 02/22/23 0522    sodium chloride (NS) flush 5-40 mL, 5-40 mL, IntraVENous, PRN, Shaila, Lizette B, NP, 40 mL at 02/17/23 1818    naloxone (NARCAN) injection 0.4 mg, 0.4 mg, IntraVENous, PRN, Shaila, Lizette B, NP    ELECTROLYTE REPLACEMENT NOTE: Nurse to review Serum Potassium and Magnesuim levels and Initiate Electrolyte Replacement Protocol as needed, 1 Each, Other, PRN, Shaila, Lizette B, NP    dextrose 10 % infusion 0-250 mL, 0-250 mL, IntraVENous, PRN, Shaila, Lizette B, NP, Last Rate: 200 mL/hr at 02/20/23 2154, 50 mL at 02/20/23 2154    acetaminophen (TYLENOL) tablet 650 mg, 650 mg, Oral, Q6H PRN, Shaila, Lizette B, NP, 650 mg at 02/17/23 0200    oxyCODONE IR (ROXICODONE) tablet 10 mg, 10 mg, Oral, Q4H PRN, Shaila, Lizette B, NP    ondansetron (ZOFRAN) injection 4 mg, 4 mg, IntraVENous, Q4H PRN, Shaila, Lizette B, NP    albuterol-ipratropium (DUO-NEB) 2.5 MG-0.5 MG/3 ML, 3 mL, Nebulization, Q6H PRN, Shaila, Lizette B, NP    [Held by provider] aspirin chewable tablet 81 mg, 81 mg, Oral, DAILY, Shaila, Lizette B, NP, 81 mg at 02/17/23 0942    senna-docusate (PERICOLACE) 8.6-50 mg per tablet 1 Tablet, 1 Tablet, Oral, DAILY, Shaila, Lizette B, NP, 1 Tablet at 02/22/23 0805    polyethylene glycol (MIRALAX) packet 17 g, 17 g, Oral, DAILY, Shaila, Lizette B, NP, 17 g at 02/22/23 0839    bisacodyL (DULCOLAX) suppository 10 mg, 10 mg, Rectal, DAILY PRN, Shaila, Lizette B, NP, 10 mg at 02/22/23 0839    0.45% sodium chloride infusion, 10 mL/hr, IntraVENous, CONTINUOUS, Malinda Mchugh MD, Last Rate: 10 mL/hr at 02/22/23 0755, 10 mL/hr at 02/22/23 0755    DOBUTamine (DOBUTREX) 500 mg/250 mL (2,000 mcg/mL) infusion, 0-10 mcg/kg/min, IntraVENous, TITRATE, Karol Johnson MD, Last Rate: 3.3 mL/hr at 02/22/23 0753, 2 mcg/kg/min at 02/22/23 0753    dexmedeTOMidine in 0.9 % NaCl (PRECEDEX) 400 mcg/100 mL (4 mcg/mL) infusion soln, 0.1-1.5 mcg/kg/hr, IntraVENous, TITRATE, Karol Johnson MD, Last Rate: 17 mL/hr at 02/22/23 0823, 1.2 mcg/kg/hr at 02/22/23 0823    0.9% sodium chloride infusion, 9 mL/hr, IntraVENous, CONTINUOUS, Karol Johnson MD, Last Rate: 11 mL/hr at 02/21/23 0800, 11 mL/hr at 02/21/23 0800    EPINEPHrine (ADRENALIN) 10 mg in 0.9% sodium chloride 250 mL infusion, 0-10 mcg/min, IntraVENous, TITRATE, Shaila Lizette B, NP, Last Rate: 7.5 mL/hr at 02/22/23 0754, 5 mcg/min at 02/22/23 0754    NOREPINephrine (LEVOPHED) 32,000 mcg in dextrose 5% 250 mL (128 mcg/mL) infusion, 0.5-16 mcg/min, IntraVENous, TITRATE, Shaila, Lizette B, NP, Last Rate: 1.9 mL/hr at 02/22/23 0841, 4 mcg/min at 02/22/23 0841    PATIENT CARE TEAM:  Patient Care Team:  Arlene Sauer MD as PCP - General (Family Medicine)  Arlene Sauer MD as PCP - [facility]  Wagner Buck MD (Cardiovascular Disease Physician)  Wen Jones MD (Gastroenterology)  Jenna Cope MD (Cardiothoracic Surgery)  Devon Carrion MD (Cardiovascular Disease Physician)  Tabitha Chery MD (Nephrology)  Di Vidal MD (Pulmonary Disease)  Malinda Mchugh MD ([address] [place] [address] Vascular Surgery)     Thank you for allowing me to participate in this patient's care. Total Critical Care time spent:   60 minutes.  There was no overlap with other services        Critical care was necessary to treat or prevent imminent or life threatening deterioration of the following conditions: cardiac failure, respiratory failure and CNS failure or compromise     Services Provided:  1. Telemetry review and 12 lead ECG interpretation  2. Hemodynamic interpretation, assessment, and management  3. Review and interpretation of CXR  4. Review and interpretation of lab values  5. Review and interpretation of microbiologic data and culture results  6. Review of medications and administration  7. Review and interpretation of nutrition requirements and management  8. Discussion of management withother consultants and services  9.  Clinical update to family members        Bernardo Quinteros NP   [address] [place] [address], [address]  Phone: [phone]

## 2023-02-22 NOTE — DIALYSIS
CRRT / [phone]         Orders   Mode: CVVHD restarted @ 1255   Blood Flow Rate: 250   Prismasol Dose: DRF 1700 (25 ml/kg/hr x 68 qh=8694)   Prismasol Concentrate: 4K/2.5ca   Blood Warmer Temp: 37C   Net Fluid Removal: 50ml/hr  decreased to 0ml/hr removal r/t BP. Primary RN to increase as appropriate          Metrics   BP: 71/55   HR: 92   Access Pressure: -51   Filter Pressure: 95   Return Pressure: 55   TMP: 26   Pressure Drop: 15          Access   Type & Location: LIJ non tunneled   Comments:    Each catheter limb disinfected per p&p, caps removed, hubs disinfected per p&p, aspirated 5 ml each limb flushed easily, dressing clean, dry and intact last changed 02/21/23                                         Labs   HBsAg (Antigen) / date:    Negative 02/18/23                                       HBsAb (Antibody) / date: Susceptible 02/18/23   Source: [employer]          Safety:   Time Out Done:   (Time) 1253   Consent signed: Verified in chart    Education: Lines visible   Primary Nurse Rpt Pre: Tigist Gardner RN   Primary Nurse Rpt Post: Tigist Gardner RN      Comments / Plan:    Labs, orders, consent and code status reviewed. Time out completed. Treatment stopped. All blood returned r/t clot in deaeration chamber. New filter [id number] primed and tested with 1 liter normal saline, initiated as ordered, running without difficulty, lines visible, blood warmer supported and attached to return line set at [facility]. Removal set at 0ml/hr for restart r/t to BP. Primary RN starting blood transfusion and will increase removal rate as able/ appropriate. Primary nurse at bedside.

## 2023-02-22 NOTE — PROGRESS NOTES
RENAL  PROGRESS NOTE        Subjective:     Remains Intubated. On CRRT. Afebrile. Remains on Dobutamine and low dose Levophed    Objective:   VITALS SIGNS:    Visit Vitals  BP 90/65   Pulse 87   Temp 98.8 °F (37.1 °C)   Resp 19   Ht 5' 6\" (1.676 m)   Wt 71.4 kg (157 lb 6.5 oz)   SpO2 94%   BMI 25.41 kg/m²       O2 Device: Endotracheal tube, Ventilator   O2 Flow Rate (L/min): 20 l/min   Temp (24hrs), Av °F (36.7 °C), Min:96.4 °F (35.8 °C), Max:99.1 °F (37.3 °C)         PHYSICAL EXAM:  Intubated/sedated  +ETT  + LE edema  Vasquez  sedated    DATA REVIEW:     INTAKE / OUTPUT:   Last shift:       07 - 1900  In: 416 [I.V.:156]  Out: 363 [Drains:85]  Last 3 shifts: 1901 -  0700  In: 5937.7 [I.V.:3681.4]  Out: 1138 [Urine:64; Drains:830]    Intake/Output Summary (Last 24 hours) at 2023 0944  Last data filed at 2023 0900  Gross per 24 hour   Intake 3493.51 ml   Output 4489 ml   Net -995.49 ml           LABS:   Recent Labs     23  0300 23  1018 23  0213 23  0437   WBC 8.7  --  5.6 7.6   HGB 7.1* 8.2* 7.0* 8.0*   HCT 22.1* 26.1* 22.3* 25.9*   PLT 60*  --  34* 54*       Recent Labs     23  0300 23  1547 23  0213 23  1653 23  1410 23  0437    138 140  --  140  --    K 4.4 4.1 3.8  --  3.6  --     108 108  --  108  --    CO2 26 25 25  --  25  --    * 160* 126*  --  117*  --    BUN 22* 24* 26*  --  32*  --    CREA 1.81* 1.85* 2.07*  --  2.38*  --    CA 8.7 8.4* 8.6  --  8.4*  --    MG 2.7* 2.4 2.6*   < >  --   --    PHOS 2.5* 2.9 2.2*   < >  --   --    ALB 3.4*  --  3.3*  --  3.4*  --    TBILI 11.7*  --  11.3*  --  10.7*  --    ALT 15  --  32  --  54  --    INR 3.4*  --  3.9*  --   --  1.9*    < > = values in this interval not displayed. Assessment:  TANNER on CKD-3a: Suspect cardiorenal effects with needed diuresis. Now has LVAD and  POST OP ATN.  Anuric->Requiring CRRT     Ischemic CM: Recurrent exacerbations. EF 20%. S/p LVAD on 2/15. Required Impella RP for RV support    Sepsis: Urosepsis-> growing yeast    BPH     Well differentiated NET Grade 1: noted on EGD 1/18/23     Anemia 2 to CKD: hgb subuoptimal. PRBC today. Left UE basilic and radial vein thrombus    Thrombocytopenia       Plan/Recommendations:  Seen on CVVHD. Factor rate-> 25ml/hr. Ct 4K Prismasol bags.   Pressors -> wean as tolerated  IV Abx  I&Os  Q12hr CRRT labs  Avoid nephrotoxins    Discussed with CVICU LORI Galvan MD  [facility]

## 2023-02-22 NOTE — PROGRESS NOTES
Occupational Therapy  2/22/2023    Chart reviewed. Patient remains medically inappropriate for OT session at this time, remains intubated/sedated, and continues to require increased pressor support. Will sign off at this time. Please reconsult when patient is medically appropriate. Thank you.      Naty Dumont, OTEDILBERTO, OTR/L

## 2023-02-22 NOTE — PROGRESS NOTES
Bedside and Verbal shift change report given to 9[address] [facility] (oncoming nurse) by Aaron Ruiz (offgoing nurse). Report included the following information SBAR, OR Summary, and Intake/Output. 0930 Dr. Rosibel Nguyễn at bedside assessing pt, Attempted RP wean, Pt's MAP dropped when RP support dropped  Returned RP to P-7    1030 Pt's native heart rhythm now 80-95, Sinus rhythm w/ 1\" AVB, TN ~ 0.33  Epicardial Pacer set to backup rate 50 w/ 12mA  Sensing appropriately  MAP improved w/ native heart rhythm    1300 Dialysis RN changing CRT circuit, going up on pressors after circuit change    1500 attempted SBT, RSBI >450, pt placed back on a rate    1800 Pt currently off Levophed, Dobutamine at 1, Epi at 5  Tolerating CRRT factor of 50, LVAD flows 3.2-3.5    Bedside and Verbal shift change report given to [address] [facility] (oncoming nurse) by Angy Robles RN (offgoing nurse). Report included the following information SBAR, OR Summary, Intake/Output, and Cardiac Rhythm V-paced .

## 2023-02-22 NOTE — DIALYSIS
CRRT / [phone]    Orders   Mode: CVVHD rounding @ 0205   Blood Flow Rate: 250 mL/min   Prismasol Dose: 25ml/kg/hr  (68kg)  DFR: 1700 ml/hr   Prismasol Concentrate: 4K/2.5Ca   Blood Warmer Temp: 37 *C   Net Fluid Removal: 0 ml/hr     Metrics   BP: 90/65 (art line)   HR: 86   Access Pressure: -88   Filter Pressure: 135   Return Pressure: 56   TMP: 32   Pressure Drop: 51     Access   Type & Location: LIJ non-tunneled CVC   Comments: CHG Transparent dressing CDI and dated 02/18/23. Labs   HBsAg (Antigen) Mikala Cruz: Neg (02/18/23)             HBsAb (Antibody) Mikala Cruz: Susceptible (02/18/23)   Source: [facility]     Safety:   Time Out Done: (Time) 2310   Consent obtained/signed: Verified   Education: Intubated/sedated   Primary Nurse Rpt: REGGIE Garcia RN     Comments / Plan:   Patient, code status, labs, and orders verified. Consent on chart. Time out complete. HF 1000 running well at this time and no indication for filter change. Lines visible and connections secure with blood warmer supported on return line, set at 37*C. Education & pre/post report to primary RN.

## 2023-02-22 NOTE — PROGRESS NOTES
CRITICAL CARE NOTE      Name: Roselia Cee   : 1950   MRN: [id number]   Date: 2023      Reason for ICU Admission: Acute on chronic HFrEF, LVAD workup     ICU PROBLEM LIST   Acute on chronic HFrEF (20%) NYHA IIIb/IV (D) on home inotropic support, life vest  TANNER on CKD3  CHB post op  Aflutter w/ RVR  Acute on chronic hypoxemic respiratory failure  CAP  CAD  Gastric neuroendocrine tumor  Hx of LUE DVT  DM  PAD  TRISTAN  BPH w/ urinary retention requiring chronic donnelly    HISTORY OF PRESENT ILLNESS:   Pt is a 71 y.o M w/ PMH as noted above admitted to [facility] on  due to ongoing symptoms secondary to his HFrEF. Treated for possible CAP, and diuresed for acute on chronic HFrEF. Nephrology following for TANNER on CKD 3. AHF team recommended transfer to CVICU for [facility] placement and ongoing LVAD workup, with placement 2/15. Pt extubated , however with development of worsening renal dysfunction overnight and unable to tolerate CRRT so was reintubated and taken for Impella RP placement for right-sided support in a.m. of  and CRRT resumed.     25 HOUR EVENTS:   Bronchoscopy overnight for mucous plugging  Otherwise no acute events  Did not tolerate Impella wean 2023    NEUROLOGICAL:    -No focal deficits  -No weakness  -Daily awakening trials  -Wean propofol as tolerated    PULMONOLOGY: Acute hypoxic respiratory failure   -Continue to monitor oxygen saturations  -Continue mechanical ventilation   -Wean as tolerated   -Daily spontaneous breathing trials   -Chest x-ray reviewed   -ABG reviewed     CARDIOVASCULAR: STEMI, multivessel disease, acute on chronic congestive heart failure   Status post LVAD   -Flows 3.3  Status post Impella RP placement, P7    -Weaning trial possibly today or tomorrow  -Continue to monitor hemodynamic status  -Shaun@[employer]  -Epinephrine@5  -proBNP 8,354 slightly elevated compared to yesterday  -Wean as tolerated  -EF 20 to 25%  -Continue heparin  -CVP goal 10-12  -Mean arterial pressure goal greater than 70  -Continue to monitor chest tube output    GASTROINTESTINAL:   Continue enteral feeds    RENAL/ELECTROLYTE/FLUIDS: TANNER, chronic kidney disease   -Continue to monitor urine output   -Continue CRRT    -Removal right 25cc/hour  -Place electrolytes as indicated  -Nephrology following  strict I/Os,   Continue CRRT    -pull off 25 cc/hr of fluid  -Vasquez in place    ENDOCRINE:   -Continue to monitor blood glucose levels   -Maintain blood glucose levels between 140-180  -Continue IV insulin    HEMATOLOGY/ONCOLOGY: Gastric neuroendocrine tumor   -Hemoglobin 7.1    -transfused 1unit  -Platelets 60 trending upward 34 yesterday  -Continue heparin    ID/MICRO: Community-acquired pneumonia   -WBC 8.7   -Procalcitonin 7.02 compared to 7.14 yesterday  -Lactic acid 1.9  -MRSA culture 20 February 2023 negative  -Continue antibiotics (Flagyl, vancomycin and Zosyn)  -Blood cultures 28 January 2023 no growth in 5 days  -Blood cultures 20 February 2023 no growth in 2 days  -Respiratory cultures 20 February 2023 no growth in 2 days  -Urine culture 20 February 2023    -YEAST Abnormal  P     ICU DAILY CHECKLIST     Code Status:Full  DVT Prophylaxis:heparin  T/L/D: ETT, Impella PIV,  Vasquez,  V  wire,  valentin  x5, LVAD drive line  SUP: PPI  Diet: NPO  Activity Level:ad jose f  ABCDEF Bundle/Checklist Completed:Yes  Disposition: Stay in ICU  Multidisciplinary Rounds Completed:  yes  Patient/Family Updated: [address] [place]   2/3 Transferred to CVICU for [facility] placement  2/7 started on dobutamine as adjunct to milrinone  2/15 LVAD implant  2/16 extubated  2/18 Impella RP placement    SUBJECTIVE:     As noted above    Review of Systems:     Unable to perform due to patient intubated    OBJECTIVE:     Labs and Data: Reviewed 02/22/23  Medications: Reviewed 02/22/23  Imaging: Reviewed 02/22/23    General - sedated, intubated chronically ill appearing  HEENT- pupils equal, nystagmus, anicteric  Neuro - sedated, no focal deficit  CV - Paced,  VAD hum, post sternotomy, valentin  x5  Resp - rales b/l, NC  Abd - soft, distended, nontender, no guarding  MSK- intact, no sacral ulcer  Right femoral Impella, LVAD driveline in left chest    Visit Vitals  BP 90/65   Pulse 87   Temp 98.8 °F (37.1 °C)   Resp 19   Ht 5' 6\" (1.676 m)   Wt 71.4 kg (157 lb 6.5 oz)   SpO2 94%   BMI 25.41 kg/m²    O2 Flow Rate (L/min): 20 l/min O2 Device: Endotracheal tube, Ventilator Temp (24hrs), Av °F (36.7 °C), Min:96.4 °F (35.8 °C), Max:99.1 °F (37.3 °C)    CVP (mmHg): 11 mmHg (23 0900)      Intake/Output:     Intake/Output Summary (Last 24 hours) at 2023 0946  Last data filed at 2023 0900  Gross per 24 hour   Intake 3493.51 ml   Output 4489 ml   Net -995.49 ml       Imaging    23    ECHO ADULT FOLLOW-UP OR LIMITED 2023    Interpretation Summary    Left Ventricle: EF by visual approximation is 20%. Left ventricle is mildly dilated. Mitral Valve: Moderately thickened leaflet, at the anterior and posterior leaflets. Moderately calcified leaflet. Moderate regurgitation. Left Atrium: Left atrium is moderately dilated. Technical qualifiers: Echo study was technically difficult with poor endocardial visualization. Contrast used: Definity. Limited study, not all structures viewed    Signed by: Clive Carter MD on 2023  4:39 PM       Pertinent imaging reviewed and interpreted as noted above    CRITICAL CARE DOCUMENTATION  I had a face to face encounter with the patient, reviewed and interpreted patient data including clinical events, labs, images, vital signs, I/O's, and examined patient.   I have discussed the case and the plan and management of the patient's care with the consulting services, the bedside nurses and the respiratory therapist.      NOTE OF PERSONAL INVOLVEMENT IN CARE   This patient has a high probability of imminent, clinically significant deterioration, which requires the highest level of preparedness to intervene urgently. I participated in the decision-making and personally managed or directed the management of the following life and organ supporting interventions that required my frequent assessment to treat or prevent imminent deterioration. I personally spent 40 minutes of critical care time. This is time spent at this critically ill patient's bedside actively involved in patient care as well as the coordination of care. This does not include any procedural time which has been billed separately.     Estelle Hamman, MD  Staff [facility]

## 2023-02-22 NOTE — DIABETES MGMT
[facility]  DIABETES MANAGEMENT CONSULT    Consulted by  Dmitriy Crowder MD   for advanced nursing evaluation and care for inpatient blood glucose management. Evaluation and Action Plan   Jem Dillard is a 71year old gentleman, with Type 2 Diabetes with a recent A1C of 7.7%, who is admitted with arrhythmias and acute on chronic HFrEF with failure to respond to inotrope support. He was discharged one week prior from a prolonged hospital stay for acute on chronic HF and LVAD work-up and discharged home on dobutamine with a lifevest.  Glucose control was tenuous during his previous admission s/t multiple co-morbidities, several tests/procedures requiring NPO status, waxing and waining oral intake. At this time glucose is impacted by:  Heart Failure with reduced EF 25-30%, LVAD implant  Vasopressor use  TANNER on CVVHD  Enteral Feeds    This admission, he required low basal doses but high bolus doses with meals to offset pre-prandial hyperglycemia. An insulin gtt has been used for glycemic control post-operatively and insulin rates have been erratic over the last 24h- especially with escalation of vasopressors in the last 24h. Please continue insulin gtt until vasopressors wean and insulin doses stabilize. Action Plan    Continue insulin gtt            Initial Presentation   Jem Dillard is a 71 y.o. male who presented to the ED 1/26/23 with lower extremity swelling and difficulty breathing. He had a prolonged hospital admission from 12/22/22-1/19/23 for acute on chronic systolic HF and LVAD work-up. He was discharged with home inotrope. LAB: , GFR 58, Trop 2003, BNP 4524  CXR: New diffuse bilateral increased interstitial markings, partially obscuring bilateral pulmonary nodules.      HX:   Past Medical History:   Diagnosis Date    CAD (coronary artery disease) 11/10/2016    NSTEMI & 2 stents    Deafness 10/28/2012    DM (diabetes mellitus) ([facility] [address]     Elevated cholesterol     Hypertension     NSTEMI (non-ST elevated myocardial infarction) ([facility] [address] 11/10/2016        INITIAL DX:   CHF (congestive heart failure) ([facility] [address] [I50.9]     Current Treatment     TX: Milrinone, Amio. Specialty consultations: [facility], Cardiology, EP, GI     Hospital Course   Clinical progress has been complicated by:    5/51: Admission. Rapid response for SVT- Given adenosine x2, amio bolus/gtt  1/27: Advanced HFC consult. EP consult ordered. Intolerance of inotropic support. Cardiology consult. NSTEMI, heparin gtt started. Seen by EP: Continue amio. 1/28: Febrile: CT chest 1/28 shows diffuse focal opacities concerning for pneumonia. Obtain blood cultures, UA. Pathology report 1/18/23: well differentiated neuroendocrine tumor grade 1. EGD: Patchy erythema gastric body, biopsies done. 6 mm sessile polyp gastric body biopsied  1/30: Oncology consult: Recommend multiphase CT of the abdomen and pelvis to evaluate for metastatic spread. Tachycardia and SOB, BiPAP overnight. 2/3: Accepted for VAD implant if renal fx improves per [facility]. CCU Transfer and swan placed  2/4: PRBC transfusion   2/6: With increased dyspnea. Doubtamine started  2/15: LVAD Implant  2/17: Extubated. CRRT started. ECHO with persistent RV failure. OR for RV impella. Remained Intubated post-op  2/20: UA with large leuk esterase and 2+ bacteria.  Urine Cx with yeast  2/21: PRBC transfusion, PLT transfusion    2/22: Bronch   Diabetes History   Type 2 Diabetes  Ambulatory BG management provided by: PCP Arleen Sauer MD    Diabetes-related Medical History  Acute complications  Acute hyperglycemia  Neurological complications  Peripheral neuropathy  Microvascular disease  Nephropathy  Macrovascular disease  CAD  Other associated conditions                                       CHF     Diabetes Medication History  Key Antihyperglycemic Medications        Diabetes Medication History  Key Antihyperglycemic Medications metFORMIN (GLUCOPHAGE) 500 mg tablet (Taking/) Take 1 Tablet by mouth two (2) times daily (with meals) for 30 days. glipiZIDE (GLUCOTROL) 5 mg tablet (Taking) Take 1 tablet by mouth twice daily    Januvia 50 mg tablet (Taking) Take 1 tablet by mouth once daily             Diabetes self-management practices:   Eating pattern                Eats 3 small meals daily  [x]         Breakfast                         2 Eggs, Coffee  [x]         Lunch                               [place]  [x]         Dinner                              \" Food\" Bread, rice, lentils   [x]         Bedtime                           [place]  [x]         Snacks                              Afternoon snack  [x]         Beverages                        Water, Coffee  Physical activity pattern                Sedentary   Monitoring pattern  Discharged last week with a [place] CGM and serum glucometer  7 day average Ba-9a: 129-164  9a-12: 243  Noon to 9p: 198-238  1 low BG in the last week overnight    Taking medications pattern  [x]         Consistent administration  [x]         Affordable  Social determinants of health impacting diabetes self-management practices   Concerned that you need to know more about how to stay healthy with diabetes  Overall evaluation:                 [x]         Achieving A1c target with drug therapy & self-care practices    Subjective     Objective   Physical exam  General Underweight [place] ([facility]) male in critical condition in CVICU. Intubated. LVAD. RV Impella, CVVHD  Neuro  Sedated   Vital Signs Visit Vitals  BP 90/65   Pulse 88   Temp 99.1 °F (37.3 °C)   Resp 19   Ht 5' 6\" (1.676 m)   Wt 71.4 kg (157 lb 6.5 oz)   SpO2 95%   BMI 25.41 kg/m²     Skin  Warm and dry. No acanthosis noted along neckline. Sternal surgical incision. Chest tubes. LVAD  Heart   Regular rate and rhythm.  No murmurs, rubs or gallops  Lungs  Clear to auscultation without rales or rhonchi  Extremities No foot wounds        Laboratory  Recent Labs     02/22/23  0300 02/21/23  1547 02/21/23  0213 02/20/23  1410 02/20/23  0437   * 160* 126* 117*  --    AGAP 6 5 7 7  --    WBC 8.7  --  5.6  --  7.6   CREA 1.81* 1.85* 2.07* 2.38*  --    *  --  235* 385*  --    ALT 15  --  32 54  --          Factors impacting BG management  Factor Dose Comments   Nutrition:  Standard meals     Enteral feeds  Vital 1.5 @ 35 ml/hr  153g CHO/24h      Heart Failure/Cardiogenic Shock HeartMate 3 LVAD  Dobutamine   Epinephrine   Norepi    Lactic Acidosis     Septic Shock UTI/PNA  Urine Cx 2/20 with yeast  IV antibiotics   Fevers    Other:   Kidney function TANNER on CKD:   Current GFR 39  CVVHD    Shock Liver LFTs- improving      Blood glucose pattern    Significant diabetes-related events over the past 24-72 hours  A1C 7.7% 1/11/23  Pre-op: Basal: Lantus 6 units daily and Bolus: 10 units Humalog/meal  Insulin gtt:   2/15: 2.8 units  2/16: 32.8 units  2/17: 23.1 units  2/18: 19.9 units  2/19: 8.7 units  2/20: 53.6 units  2/21: 99 units   2/22: 26.1 units since MN  Gtts: Dobutamine (weaning), epi (5), norepi (3-8 in the last day)      Assessment and Nursing Intervention   Nursing Diagnosis Risk for unstable blood glucose pattern   Nursing Intervention Domain 9753 Decision-making Support   Nursing Interventions Examined current inpatient diabetes/blood glucose control   Explored factors facilitating and impeding inpatient management  Explored corrective strategies with patient and responsible inpatient provider   Informed patient of rational for insulin strategy while hospitalized     Billing Code(s)   59801    Before making these care recommendations, I personally reviewed the hospitalization record, including notes, laboratory & diagnostic data and current medications, and examined the patient at the bedside (circumstances permitting) before determining care.  More than fifty (50) percent of the time was spent in patient counseling and/or care coordination.   Total minutes: 25    Jono Dye CNS  Diabetes Clinical Nurse Specialist  [facility]  Access via [employer]

## 2023-02-23 NOTE — DIALYSIS
CRRT / [phone]    Orders   Mode: CVVHD rounding @ 0055   Blood Flow Rate: 250 mL/min   Prismasol Dose: 25ml/kg/hr  (68kg)  DFR: 1700 ml/hr   Prismasol Concentrate: 4K/2.5Ca   Blood Warmer Temp: 37 *C   Net Fluid Removal: 0 ml/hr     Metrics   BP: 108/66   HR: 82   Access Pressure: -82   Filter Pressure: 160   Return Pressure: 58   TMP: 40   Pressure Drop: 68     Access   Type & Location: LIJ non-tunneled CVC   Comments: CHG Transparent dressing CDI and dated 02/21/23      Labs   HBsAg (Antigen) Mikala [place]edmont: Neg (02/18/23)             HBsAb (Antibody) Mikala Georgedmont: Susceptible (02/18/23)   Source: "[facility]     Safety:   Time Out Done: (Time) 0050   Consent obtained/signed: Verified   Education: Intubated/sedated   Primary Nurse Rpt: Malachi Damon RN     Comments / Plan:   Patient, code status, labs, and orders verified. Consent on chart. Time out complete. HF 1000 running well at this time and no indication for filter change. Lines visible and connections secure with blood warmer supported on return line, set at 37*C. Education & pre/post report to primary RN.

## 2023-02-23 NOTE — DIALYSIS
CRRT / [phone]         Orders   Mode: CVVHD restarted @ 1325   Blood Flow Rate: 250ml/min   Prismasol Dose: DRF 1750 (25 ml/kg/hr x 69.4 (70) iy=[id number])   Prismasol Concentrate: 4K/2.5ca   Blood Warmer Temp: 37C   Net Fluid Removal: 0ml/hr removal r/t BP. Primary RN to increase as appropriate          Metrics   BP: 87/55   HR: 94   Access Pressure: -63   Filter Pressure: 120   Return Pressure: 62   TMP: 25   Pressure Drop: 15          Access   Type & Location: LIJ non tunneled   Comments:    Each catheter limb disinfected per p&p, caps removed, hubs disinfected per p&p, aspirated 5 ml each limb flushed easily, dressing clean, dry and intact last changed 02/21/23- LINES REVERSED                                 Labs   HBsAg (Antigen) / date:    Negative 02/18/23                                       HBsAb (Antibody) / date: Susceptible 02/18/23   Source: [facility]          Safety:   Time Out Done:   (Time) 1320   Consent signed: Verified in chart    Education: Lines visible   Primary Nurse Rpt Pre:  Duarte Abebe RN   Primary Nurse Rpt Post: Duarte Abebe RN      Comments / Plan:   Primary RN called to re-start CRRT, blood rinsed back by primary RN. Labs, orders, consent and code status reviewed. Time out completed. Treatment stopped. All blood returned r/t clot in deaeration chamber. New filter [id number] primed and tested with 1 liter normal saline, initiated as ordered, running without difficulty, lines visible, blood warmer supported and attached to return line set at [facility]. Removal set at 0ml/hr for restart r/t to BP. Primary RN starting blood transfusion and will increase removal rate as able/ appropriate. Primary nurse at bedside.

## 2023-02-23 NOTE — PROGRESS NOTES
Pharmacy renal dose protocol:  Day #1 of fluconazole  Indication:  UTI  Current regimen:  200 mg IV q24h    Recent Labs     23  0310 23  1628 23  0300 23  1547 23  0213   WBC 9.4  --  8.7  --  5.6   CREA 1.92* 1.94* 1.81*   < > 2.07*   BUN 21* 22* 22*   < > 26*    < > = values in this interval not displayed. Est CrCl: CVVHD   Temp (24hrs), Av.5 °F (36.9 °C), Min:96.6 °F (35.9 °C), Max:102.2 °F (39 °C)    Cultures:   urine: > 100k candida parapsilosis - final      Fluconazole undergoes substantial tubular reabsorption in patients with normal kidney function. Because this reabsorption is absent in anuric patients receiving renal replacement therapy, total fluconazole clearance during CRRT with rates of 1,500 to 3,000 mL/hour is 1.5 to 2.3 times that reported in healthy volunteers.   Plan: Change to 400 mg IV q24h

## 2023-02-23 NOTE — PROGRESS NOTES
Pharmacy consult note:Day #4 of Vancomycin  Indication:  sepsis / VAP  - s/p LVAD 2/15 and impella   Current regimen:  1 gm IV q24h  Abx regimen:  Fluconazole, Metronidazole,Vanc, Zosyn     Recent Labs     23  0310 23  1628 23  0300 23  1547 23  0213   WBC 9.4  --  8.7  --  5.6   CREA 1.92* 1.94* 1.81*   < > 2.07*   BUN 21* 22* 22*   < > 26*    < > = values in this interval not displayed. Est CrCl: CVVHD  Temp (24hrs), Av.9 °F (36.6 °C), Min:96.6 °F (35.9 °C), Max:100.9 °F (38.3 °C)    Cultures:    blood: NGTD - prelim   sputum: NG - final   urine: > 100k candida parapsilosis - final   MRSA: Neg    Goal target range Trough 10-15 mcg/mL    Recent level history:  Date/Time Dose & Interval Measured Level (mcg/mL)  Dose Adjustment     @ 0905 1750 mg x1 13.8  ~ 24 hrs p dose  1000mg q24h    @ 1158 1g q24h 19.5  ~ 24.5 hrs p dose  750 mg IV q24h                                  Plan: Vancomycin level above goal at 19.5 mcg/ml approximately 24.5 hrs post dose.  Will adjust dose to 750mg IV q24h to target goal range 10-15 mcg/ml

## 2023-02-23 NOTE — PROGRESS NOTES
RENAL  PROGRESS NOTE        Subjective:     Remains Intubated. Awake off propofol. On CRRT. Afebrile. Remains on Dobutamine and Epi drips    Objective:   VITALS SIGNS:    Visit Vitals  BP (!) 71/55   Pulse 81   Temp 97.7 °F (36.5 °C)   Resp 24   Ht 5' 6\" (1.676 m)   Wt 69.4 kg (153 lb)   SpO2 96%   BMI 24.69 kg/m²       O2 Device: Endotracheal tube, Heated, Ventilator   O2 Flow Rate (L/min): 20 l/min   Temp (24hrs), Av.4 °F (36.9 °C), Min:96.6 °F (35.9 °C), Max:102.2 °F (39 °C)         PHYSICAL EXAM:  Intubated/Awake  +ETT  + LE edema  Vasquez    DATA REVIEW:     INTAKE / OUTPUT:   Last shift:       07 - 1900  In: 456.3 [I.V.:261.3]  Out: 429 [Drains:20]  Last 3 shifts: 1901 -  0700  In: 5459.7 [I.V.:3571.3]  Out: 6080 [Urine:20; Drains:940]    Intake/Output Summary (Last 24 hours) at 2023 1038  Last data filed at 2023 1000  Gross per 24 hour   Intake 3765.42 ml   Output 4127 ml   Net -361.58 ml           LABS:   Recent Labs     23  0310 23  1628 23  0300 23  1018 23  0213   WBC 9.4  --  8.7  --  5.6   HGB 8.3* 8.0* 7.1*   < > 7.0*   HCT 25.8* 25.1* 22.1*   < > 22.3*   PLT 80*  --  60*  --  34*    < > = values in this interval not displayed. Recent Labs     23  0310 23  1628 23  0300 23  1547 23  0213    139 139 138 140   K 3.9 4.2 4.4 4.1 3.8    108 107 108 108   CO2 26 25 26 25 25   * 155* 120* 160* 126*   BUN 21* 22* 22* 24* 26*   CREA 1.92* 1.94* 1.81* 1.85* 2.07*   CA 9.0 8.4* 8.7 8.4* 8.6   MG 2.8* 2.5* 2.7* 2.4 2.6*   PHOS  --  2.1* 2.5* 2.9 2.2*   ALB 3.5  --  3.4*  --  3.3*   TBILI 16.9*  --  11.7*  --  11.3*   ALT 12  --  15  --  32   INR 1.9*  --  3.4*  --  3.9*           Assessment:  TANNER on CKD-3a: Suspect cardiorenal effects with needed diuresis. Now has LVAD and  POST OP ATN. Anuric->Requiring CRRT     Ischemic CM: Recurrent exacerbations. EF 20%. S/p LVAD on 2/15.  Required Impella 11 RP for RV support-> attempts to wean not tolerated by patient. Sepsis: Urosepsis-> growing candida    BPH     Well differentiated NET Grade 1: noted on EGD 1/18/23     Anemia 2 to CKD: hgb holding s/p PRBCS      Left UE basilic and radial vein thrombus    Thrombocytopenia       Plan/Recommendations:  Seen on CVVHD. Factor rate-> at 50ml/hr. Ct 4K Prismasol bags. IV Abx  Can consider removing donnelly->doing intermittent bladder scans.  I think renal recovery will take some time  Q12hr CRRT labs  Avoid nephrotoxins    Discussed with CVICU RN and patient's son (via telephone)      Jadiel Galvan MD  [facility]

## 2023-02-23 NOTE — PROGRESS NOTES
0800- bedside report and drips verified. Patient intubated, (AC 18/350/40%/5) sedated, RP impella (P7), LVAD (4600/2.9/6.7/3). Plan for SBT today. 8476- precedex decreased for planned SBT. Patient is currently awake, opening eyes, tracking, and squeezing hands to command. Arminda Schneider MD at bedside; update given. 0930Stluiz Michael NP at bedside for rounds; update given. Orders to stop dobutamine, leave epi at 4 mcg, and use levophed as needed for goal MAP > 65. Carmina Munoz MD at bedside and also aware of plan. 1004- donnelly catheter removed per order. 1040- [facility] Hemodynamic Monitoring     Visit Vitals  BP (!) 71/55   Pulse 81   Temp 97.7 °F (36.5 °C)   Resp 24   Ht 5' 6\" (1.676 m)   Wt 69.4 kg (153 lb)   SpO2 96%   BMI 24.69 kg/m²         Baseline assessment:        CO 5.4        CI 3.3        SVI 37        SVV 9        TPR 1065       1044- dobutamine stopped per Maryse De La Vega NP    1132- CRRT alarming extremely negative; lines are not kinked, decreased flow rate. Unable to resolve alarms. Rinsed 165 ml of blood back. Will inform Derrell Guo. 1140- heparin drip restarted per Maryse De La Vega NP.    1200- [facility] Hemodynamic Monitoring     Visit Vitals  BP (!) 71/55   Pulse 88   Temp 99.9 °F (37.7 °C)   Resp 21   Ht 5' 6\" (1.676 m)   Wt 69.4 kg (153 lb)   SpO2 95%   BMI 24.69 kg/m²         Baseline assessment:        CO 5.5        CI 3.1        SVI 34        SVV 15            1203- levophed restarted; MAP 59.    1230- precedex increased; patient coughing and gagging on ETT; frequent suction alarms on impella, patient remaining awake with RR 18-20, -370.    1615- updated MD Elizabeth on decreased purge flow and purge pressures. Orders to [address]     [facility] Hemodynamic Monitoring     Visit Vitals  BP (!) 70/50   Pulse 89   Temp 99.3 °F (37.4 °C)   Resp 22   Ht 5' 6\" (1.676 m)   Wt 69.4 kg (153 lb)   SpO2 95%   BMI 24.69 kg/m²         Baseline assessment:        CO 4.5        CI 2.5        SVI 25        SVV 16        TPR 1452       1900- spoke to [place], [place] regarding impella decreasing flows and  increasing pressures. Orders to change cathflo from 1 mg to 2 mg.    2000- Bedside shift change report given to Marisa Chauhan RN (oncoming nurse) by Holly Regan RN (offgoing nurse). Report included the following information SBAR, Recent Results, and Cardiac Rhythm 1 degree AV Block .

## 2023-02-23 NOTE — PROGRESS NOTES
CRITICAL CARE NOTE      Name: Nikki Rojas   : 1950   MRN: [id number]   Date: 2023      Reason for ICU Admission: Acute on chronic HFrEF, LVAD workup     ICU PROBLEM LIST   Acute on chronic HFrEF (20%) NYHA IIIb/IV (D) on home inotropic support, life vest  TANNER on CKD3  CHB post op  Aflutter w/ RVR  Acute on chronic hypoxemic respiratory failure  CAP  CAD  Gastric neuroendocrine tumor  Hx of LUE DVT  DM  PAD  TRISTAN  BPH w/ urinary retention requiring chronic donnelly    HISTORY OF PRESENT ILLNESS:   Pt is a 71 y.o M w/ PMH as noted above admitted to [facility] on  due to ongoing symptoms secondary to his HFrEF. Treated for possible CAP, and diuresed for acute on chronic HFrEF. Nephrology following for TANNER on CKD 3. AHF team recommended transfer to CVICU for [facility] placement and ongoing LVAD workup, with placement 2/15. Pt extubated , however with development of worsening renal dysfunction overnight and unable to tolerate CRRT so was reintubated and taken for Impella RP placement for right-sided support in a.m. of  and CRRT resumed.     24 HOUR EVENTS:   No acute events     NEUROLOGICAL:    -No focal deficits  -No weakness  -Daily awakening trials  -Wean propofol as tolerated    PULMONOLOGY: Acute hypoxic respiratory failure   -Continue to monitor oxygen saturations  -Continue mechanical ventilation   -Wean as tolerated   -Daily spontaneous breathing trials   -Chest x-ray reviewed   -ABG reviewed     CARDIOVASCULAR: STEMI, multivessel disease, acute on chronic congestive heart failure   Status post LVAD   -Flows 3-4    -Speed 4600  Status post Impella RP placement, P7    -Weaning trial possibly today or tomorrow  -Continue to monitor hemodynamic status  -Cameron@[employer]  -Norepinephrine@4  -proBNP 9387 slightly elevated compared to yesterday  -Wean as tolerated  -EF 20 to 25%  -Continue heparin  -CVP goal 10-12  -Mean arterial pressure goal greater than 70  -Continue to monitor chest tube output    GASTROINTESTINAL:   Continue enteral feeds    RENAL/ELECTROLYTE/FLUIDS: TANNER, chronic kidney disease   -Continue to monitor urine output   -Continue CRRT    -Removal right 50cc/hour  -Place electrolytes as indicated  -Nephrology following  strict I/Os,   Continue CRRT    -pull off 50 cc/hr of fluid  -Vasquez in place    ENDOCRINE:   -Continue to monitor blood glucose levels   -Maintain blood glucose levels between 140-180  -Continue IV insulin    HEMATOLOGY/ONCOLOGY: Gastric neuroendocrine tumor   -Hemoglobin 8.3  -Platelets 80  -Continue heparin    ID/MICRO: Community-acquired pneumonia   -WBC 9.4  -Procalcitonin 5.86  -Lactic acid 1.9  -MRSA culture 20 February 2023 negative  -Continue antibiotics (Flagyl, vancomycin and Zosyn)  -Blood cultures 28 January 2023 no growth in 5 days  -Blood cultures 20 February 2023 no growth in 2 days  -Respiratory cultures 20 February 2023 no growth in 2 days  -Urine culture 20 February 2023    -YEAST Abnormal  P   -Continue antibiotics vancomycin, Flagyl and Zosyn  -Continue antifungal (Diflucan)    ICU DAILY CHECKLIST     Code Status:Full  DVT Prophylaxis:heparin  T/L/D: ETT, Impella PIV,  Vasquez,  V  wire,  valentin  x5, LVAD drive line  SUP: PPI  Diet: NPO  Activity Level:ad jose f  ABCDEF Bundle/Checklist Completed:Yes  Disposition: Stay in ICU  Multidisciplinary Rounds Completed:  yes  Patient/Family Updated: Yes    [facility]   2/3 Transferred to CVICU for [facility] placement  2/7 started on dobutamine as adjunct to milrinone  2/15 LVAD implant  2/16 extubated  2/18 Impella RP placement    SUBJECTIVE:     As noted above    Review of Systems:     Unable to perform due to patient intubated    OBJECTIVE:     Labs and Data: Reviewed 02/23/23  Medications: Reviewed 02/23/23  Imaging: Reviewed 02/23/23    General - sedated, intubated chronically ill appearing  HEENT- pupils equal, nystagmus, anicteric  Neuro - sedated, no focal deficit  CV - Paced,  VAD hum, post sternotomy, valentin  x5  Resp - rales b/l, NC  Abd - soft, distended, nontender, no guarding  MSK- intact, no sacral ulcer  Right femoral Impella, LVAD driveline in left chest    Visit Vitals  BP (!) 70/50   Pulse 89   Temp 99.3 °F (37.4 °C)   Resp 22   Ht 5' 6\" (1.676 m)   Wt 69.4 kg (153 lb)   SpO2 95%   BMI 24.69 kg/m²    O2 Flow Rate (L/min): 20 l/min O2 Device: Endotracheal tube, Heated, Ventilator Temp (24hrs), Av.8 °F (36.6 °C), Min:96.6 °F (35.9 °C), Max:100.9 °F (38.3 °C)    CVP (mmHg): 11 mmHg (23 1600)      Intake/Output:     Intake/Output Summary (Last 24 hours) at 2023 1706  Last data filed at 2023 1600  Gross per 24 hour   Intake 3379.14 ml   Output 3648 ml   Net -268.86 ml       Imaging    23    ECHO ADULT FOLLOW-UP OR LIMITED 2023    Interpretation Summary    Left Ventricle: EF by visual approximation is 20%. Left ventricle is mildly dilated. Mitral Valve: Moderately thickened leaflet, at the anterior and posterior leaflets. Moderately calcified leaflet. Moderate regurgitation. Left Atrium: Left atrium is moderately dilated. Technical qualifiers: Echo study was technically difficult with poor endocardial visualization. Contrast used: Definity. Limited study, not all structures viewed    Signed by: Mary Beth Ramos MD on 2023  4:39 PM       Pertinent imaging reviewed and interpreted as noted above    CRITICAL CARE DOCUMENTATION  I had a face to face encounter with the patient, reviewed and interpreted patient data including clinical events, labs, images, vital signs, I/O's, and examined patient.   I have discussed the case and the plan and management of the patient's care with the consulting services, the bedside nurses and the respiratory therapist.      NOTE OF PERSONAL INVOLVEMENT IN CARE   This patient has a high probability of imminent, clinically significant deterioration, which requires the highest level of preparedness to intervene urgently. I participated in the decision-making and personally managed or directed the management of the following life and organ supporting interventions that required my frequent assessment to treat or prevent imminent deterioration. I personally spent 40 minutes of critical care time. This is time spent at this critically ill patient's bedside actively involved in patient care as well as the coordination of care. This does not include any procedural time which has been billed separately.     Phillip Summers MD  Staff [facility]

## 2023-02-23 NOTE — PROGRESS NOTES
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 Fatou Rosario  C/b RV failure requiring Impella RP placed 23  C/b acute on chronic renal failure, requiring CRRT  C/b hepatic failure  C/b lactic acidosis  C/b septic shock   Patient was approved for LVAD implantation as destination therapy at [facility] 2/3/23; the following have been identified and d/w patient as relative concerns pertaining to LVAD candidacy: small body surface area, cachexia, BMI 18, malnutrition, frailty, advanced age, muscular deconditioning, PVD (fingertips), urinary retention requiring donnelly catheter, neuroendocrine tumor class 1 requiring treatment after LVAD, mild neurocognitive dysfunction, chronic kidney disease and hearing loss requiring hearing aid         RECOMMENDATIONS:  Continue Impella RP at P7, HM3 LVAD at 4600rpms  Off Dobutamine  Keep Epi at 4mcg, no weaning today  Levophed to keep MAP > 65  CVP goal 12-15  Check NICOM q 4 hours  TTE tomorrow   No beta-blockers due to hypotension and RV conditioning prior to LVAD  Cannot tolerate ARB/ARNi due to hypotension and upcoming surgical procedure   Cannot tolerate spironolactone due to hyperkalemia  Cannot tolerate jardiance due to significant diuresis on smallest dose/contributed to IVVD  Previously discontinued corlanor due to SVT  Digoxin stopped d/t risk for toxicity with amio loading  Discontinued amio due to intermitted heart blocks under pacemaker  Nephrology consult appreciated, cont CRRT factor to 50  Keep K+ >4 and Mg >2  Transfuse to keep hgb > 7  Continue broad spectrum abx  Blood cx NGTD  Sputum NGTD  Urine- yeast   Start Diflucan 200mg daily   Hold ASA d/t thrombocytopenia- improving   Resume low dose coumadin tonight, INR goal 2-3  Resume heparin gtt today  HIT panel negative   Continue TF   Continue bowel regimen   Daily dressing changes to Drive line exit site  Pulmonary hygiene   Timing of extubation per intensivist- would cont SBT for now  D/w Dr. Rachana Michael, nephrology, Dr. Lane Wayne and bedside RN         INTERVAL HISTORY:  POD 8  No low flow alarms  Maintaining good RV and LV flows  LFTs downtrending  T Bili up to 16 today   Lactic acid WNL  PCT trending down   Impella at p7       IMPRESSION:  Acute on chronic combined systolic/diastolic  heart failure  Stage D, NYHA class IV symptoms   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, LVEF 20% (by echo 1/2023) and 23% (by cMRI 1/3/23)  Cardiac MRI suggestive of ischemic cardiomyopathy  PYP equivocal  S/p HeartMate 3  LVAD-DT (2/15/23)  C/b RV failure requiring Impella RP (2/18/23)  C/b franco on CKD requiring CRRT  C/b liver dysfunction (ischemic vs congestive)  Coronary artery disease  CAD s/p CABG x 2: further disease best managed medically due to small vessel size   At risk of sudden cardiac death  Peripheral arterial disease  Bilateral hydronephrosis s/p donnelly  Cardiac risk factors:  HTN  HL  TRISTAN, STOP-BANG 4  DM2  CKD, stage 3  MOCA from 2/9 19/30, consistent with mild cognitive impairment  Hard of hearing  Gastric Neuroendocrine Tumor, elevated gastrin and chromogranin A  Septic shock, source pending           LIFE GOALS:  Lifestyle goals reviewed with the patient.   Patient's personal goals include: having a few more years with family  Important upcoming milestones or family events: None at this time   The patient identifies the following as important for living well: being at home, not being SOB              CARDIAC IMAGING:   Echo 2/19/23    Left Ventricle: Severely reduced left ventricular systolic function with a visually estimated EF of 20 - 25%. Not well visualized. Left ventricle size is normal. Increased wall thickness. Unable to assess wall motion. Abnormal diastolic function. LVAD is present. LVAD inflow cannula was not well visualized. Right Ventricle: Not well visualized. Right ventricle is mildly dilated. Moderately reduced systolic function. TAPSE is abnormal.    Mitral Valve: Severe annular calcification at the anterior and posterior leaflets of the mitral valve. Mild regurgitation. Left Atrium: Left atrium is dilated. Right Atrium: Right atrium is dilated. Technical qualifiers: Echo study was technically difficult and technically difficult with poor endocardial visualization. Echo 1/27/23    Left Ventricle: EF by visual approximation is 20%. Left ventricle is mildly dilated. Mitral Valve: Moderately thickened leaflet, at the anterior and posterior leaflets. Moderately calcified leaflet. Moderate regurgitation. Left Atrium: Left atrium is moderately dilated. Technical qualifiers: Echo study was technically difficult with poor endocardial visualization. Contrast used: Definity. Limited study, not all structures viewed     Echo 1/9/23   Left Ventricle: Severely reduced left ventricular systolic function with a visually estimated EF of 25 - 30%. Left ventricle size is normal. Mildly increased wall thickness. Echo 12/26/22    Left Ventricle: Severely reduced left ventricular systolic function with a visually estimated EF of 25 - 30%. Left ventricle size is normal. Normal wall thickness. There are regional wall motion abnormalities. Grade II diastolic dysfunction with increased LAP. Right Ventricle: Moderately reduced systolic function. TAPSE is abnormal. TAPSE is 1.1 cm. Aortic Valve: Mild stenosis of the aortic valve. AV peak gradient is 13 mmHg. AV peak velocity is 1.8 m/s. Mitral Valve: Not well visualized.  Moderate annular calcification at the posterior leaflet of the mitral valve. Mild to moderate regurgitation. Tricuspid Valve: Mildly elevated RVSP. Left Atrium: Left atrium is moderately dilated. 12/8/22    Left Ventricle: Moderately reduced left ventricular systolic function with a visually estimated EF of 35 - 40%. Severe hypokinesis of the following segments: mid anteroseptal, apical anterior, apical septal, apical inferior and apical lateral. Severe hypokinesis of the apex. Mitral Valve: Severely thickened leaflet, at the anterior and posterior leaflets. Severely calcified leaflet, at the anterior and posterior leaflets. Mild annular calcification of the mitral valve. Moderate regurgitation. Left Atrium: Left atrium is mildly dilated. Contrast used: Definity. limited study     EKG 12/22/22 ST, Biatria enlargement, marked ST abnormality     C 12/6/22  1. Normal LVEDP  2. Severe native multivessel coronary artery disease  3. Patent LIMA to LAD and vein graft to distal RCA  4. Recurrent ISR in OM1 stent with now 60 to 70% restenosis  5. Recoil of left main and circumflex stent with now recurrent 40 to 50% stenosis. 6.  Progression of ostial left main disease now to about 60% stenosis  7. Progression of disease in jailed first marginal branch now with diffuse 90% stenosis  8.   High-grade stenosis in the mid to distal right potential femoral artery treated with 6 x 40 mm impact drug-coated balloon angioplasty to reduce the stenosis to less than 40%        HEMODYNAMICS:  RHC 1/9/23  PA 20/9, RA 3, PCWP 8, CI 1.8     CPEST too ill   6MW 300 feet       LVAD INTERROGATION:  Device interrogated in person  Device function normal, normal flow, no events  LVAD   Pump Speed (RPM): 4600  Pump Flow (LPM): 3.2  PI (Pulsitility Index): 6.1  Power: 2.9   Test: Yes  Back Up  at Bedside & Labeled: Yes  Power Module Test: Yes  Driveline Site Care: No  Driveline Dressing: Clean, Dry, and Intact  Testing  Alarms Reviewed: Yes  Back up SC speed: 4600  Back up Low Speed Limit: 4200  Emergency Equipment with Patient?: Yes  Emergency procedures reviewed?: Yes  Drive line site inspected?: No  Drive line intergrity inspected?: Yes  Drive line dressing changed?: No    PHYSICAL EXAM:  Visit Vitals  BP (!) 71/55   Pulse 84   Temp 97 °F (36.1 °C)   Resp 19   Ht 5' 6\" (1.676 m)   Wt 153 lb (69.4 kg)   SpO2 98%   BMI 24.69 kg/m²     Hemodynamics:   CVP: CVP (mmHg): 12 mmHg (23 0900)          Physical Exam  Vitals and nursing note reviewed. Constitutional:       Appearance: He is ill-appearing. Interventions: He is sedated and intubated. Cardiovascular:      Rate and Rhythm: Normal rate and regular rhythm. Comments: LVAD Humm noted on auscultation   Pulmonary:      Effort: Pulmonary effort is normal. No respiratory distress. He is intubated. Breath sounds: Rhonchi present. Comments: Coarse lung sounds  Abdominal:      General: Bowel sounds are decreased. There is no distension. Comments: Driveline exit site with dressing C/D/I   Musculoskeletal:      Right lower le+ Pitting Edema present. Left lower le+ Pitting Edema present. Comments: Left groin Impella in place, dressing C/D/I   Skin:     Capillary Refill: Capillary refill takes more than 3 seconds. Coloration: Skin is jaundiced.            REVIEW OF SYSTEMS:  Review of Systems   Unable to perform ROS: Intubated           PAST MEDICAL HISTORY:  Past Medical History:   Diagnosis Date    CAD (coronary artery disease) 11/10/2016    NSTEMI & 2 stents    Deafness 10/28/2012    DM (diabetes mellitus) ([facility] [address]     Elevated cholesterol     Hypertension     NSTEMI (non-ST elevated myocardial infarction) ([facility] [address] 11/10/2016       PAST SURGICAL HISTORY:  Past Surgical History:   Procedure Laterality Date    COLONOSCOPY N/A 2018    COLONOSCOPY performed by Steffanie Mcclure MD at [facility] N/A 2023    COLONOSCOPY performed by Porsha Lebron MD at [facility] ENDOSCOPY    HX APPENDECTOMY      NM UNLISTED PROCEDURE CARDIAC SURGERY  11/11/2016    2 stents       FAMILY HISTORY:  Family History   Problem Relation Age of Onset    Heart Disease Father     Heart Attack Father     Hypertension Mother     Elevated Lipids Brother     Elevated Lipids Brother     No Known Problems Sister     Elevated Lipids Brother     No Known Problems Son     No Known Problems Daughter     Anesth Problems Neg Hx        SOCIAL HISTORY:  Social History     Socioeconomic History    Marital status:    Tobacco Use    Smoking status: Never     Passive exposure: Never    Smokeless tobacco: Never   Vaping Use    Vaping Use: Never used   Substance and Sexual Activity    Alcohol use: Yes     Alcohol/week: 2.0 standard drinks     Types: 1 Cans of beer, 1 Shots of liquor per week     Comment: rarely    Drug use: No    Sexual activity: Yes     Social Determinants of Health     Financial Resource Strain: Medium Risk    Difficulty of Paying Living Expenses: Somewhat hard   Food Insecurity: Food Insecurity Present    Worried About Running Out of Food in the Last Year: Never true    Ran Out of Food in the Last Year: Often true       LABORATORY RESULTS:     Labs Latest Ref Rng & Units 2/23/2023 2/22/2023 2/22/2023 2/21/2023 2/21/2023 2/21/2023 2/20/2023   WBC 4.1 - 11.1 K/uL 9.4 - 8.7 - - 5.6 -   RBC 4.10 - 5.70 M/uL 3.00(L) - 2.58(L) - - 2.57(L) -   Hemoglobin 12.1 - 17.0 g/dL 8. 3(L) 8.0(L) 7. 1(L) - 8. 2(L) 7. 0(L) -   Hematocrit 36.6 - 50.3 % 25. 8(L) 25. 1(L) 22. 1(L) - 26. 1(L) 22. 3(L) -   MCV 80.0 - 99.0 FL 86.0 - 85.7 - - 86.8 -   Platelets 457 - 669 K/uL 80(L) - 60(L) - - 34(LL) -   Lymphocytes 12 - 49 % 8(L) - 8(L) - - 11(L) -   Monocytes 5 - 13 % 12 - 9 - - 6 -   Eosinophils 0 - 7 % 6 - 9(H) - - 7 -   Basophils 0 - 1 % 1 - 1 - - 1 -   Albumin 3.5 - 5.0 g/dL 3.5 - 3.4(L) - - 3. 3(L) 3.4(L)   Calcium 8.5 - 10.1 MG/DL 9.0 8.4(L) 8.7 8.4(L) - 8.6 8.4(L)   Glucose 65 - 100 mg/dL 133(H) 155(H) 120(H) 160(H) - 126(H) 117(H)   BUN 6 - 20 MG/DL 21(H) 22(H) 22(H) 24(H) - 26(H) 32(H)   Creatinine 0.70 - 1.30 MG/DL 1.92(H) 1.94(H) 1.81(H) 1.85(H) - 2.07(H) 2.38(H)   Sodium 136 - 145 mmol/L 138 139 139 138 - 140 140   Potassium 3.5 - 5.1 mmol/L 3.9 4.2 4.4 4.1 - 3.8 3.6   TSH 0.36 - 3.74 uIU/mL - - - - - - -   PSA 0.01 - 4.0 ng/mL - - - - - - -   LDH 85 - 241 U/L 637(H) - 573(H) - - 598(H) -   Some recent data might be hidden     Lab Results   Component Value Date/Time    TSH 3.52 01/28/2023 05:26 AM    TSH 2.12 12/27/2022 02:36 PM    TSH 4.80 (H) 12/06/2022 03:53 AM    TSH 5.39 (H) 10/12/2022 09:10 AM    TSH 3.53 02/03/2022 11:47 AM    TSH 5.790 (H) 11/21/2019 04:45 PM    TSH 3.08 06/22/2018 01:53 PM    TSH 4.250 05/26/2015 09:43 AM       ALLERGY:  Allergies   Allergen Reactions    Ambien [Zolpidem] Other (comments)     Causes extreme confusion        CURRENT MEDICATIONS:    Current Facility-Administered Medications:     Vancomycin Random level at 1100 today, , Other, ONCE, Walker Aponte MD    warfarin (COUMADIN) tablet 1 mg, 1 mg, Oral, ONCE, Lizette Linton NP    0.9% sodium chloride infusion 250 mL, 250 mL, IntraVENous, PRN, Lizette Linton NP    piperacillin-tazobactam (ZOSYN) 3.375 g in 0.9% sodium chloride (MBP/ADV) 100 mL MBP, 3.375 g, IntraVENous, Q8H, Walker Aponte MD, Last Rate: 25 mL/hr at 02/23/23 0524, 3.375 g at 02/23/23 0524    Vancomycin - pharmacy to dose, , Other, Rx Dosing/Monitoring, Walker Aponte MD    metroNIDAZOLE (FLAGYL) IVPB premix 500 mg, 500 mg, IntraVENous, Q12H, Estiven MOLINA MD, Last Rate: 100 mL/hr at 02/23/23 0911, 500 mg at 02/23/23 0911    vancomycin (VANCOCIN) 1,000 mg in 0.9% sodium chloride 250 mL ([id number]), 1,000 mg, IntraVENous, Q24H, Walker Aponte MD, Last Rate: 250 mL/hr at 02/22/23 1111, 1,000 mg at 02/22/23 1111    white petrolatum-mineral oiL (STYE LUBRICANT) ointment, , Both Eyes, Q12H, Luis Carlos Osorio NP, Given at 02/22/23 2140    bicarbonate dialysis (PRISMASOL) BG K 4/Ca 2.5 5000 ml solution, , Extracorporeal, DIALYSIS CONTINUOUS, Jadiel Galvan MD, Last Rate: 1,700 mL/hr at 02/23/23 0628, New Bag at 02/23/23 0628    heparin (porcine) 1,000 unit/mL injection 1,700 Units, 1,700 Units, InterCATHeter, DIALYSIS PRN, 1,700 Units at 02/20/23 0040 **AND** heparin (porcine) 1,000 unit/mL injection 1,500 Units, 1,500 Units, InterCATHeter, DIALYSIS PRN, Christine Cammie, DO, 1,500 Units at 02/20/23 0037    propofol (DIPRIVAN) 10 mg/mL infusion, 0-50 mcg/kg/min, IntraVENous, TITRATE, Chad Florez MD, Stopped at 02/22/23 9210    sodium bicarbonate (8.4%) 25 mEq in dextrose 5% 1,000 mL infusion, , Other, TITRATE, Nancy Stovall MD, Last Rate: 5.9 mL/hr at 02/22/23 2009, Rate Verify at 02/22/23 2009    fentaNYL (PF) 1,500 mcg/30 mL (50 mcg/mL) infusion, 0-200 mcg/hr, IntraVENous, TITRATE, Ralf MOLINA MD, Last Rate: 2.5 mL/hr at 02/23/23 0630, 125 mcg/hr at 02/23/23 0630    balsam peru-castor oiL (VENELEX) ointment, , Topical, BID, Sandra Johnson MD, Given at 02/23/23 0916    niCARdipine (CARDENE) 50 mg in 0.9% sodium chloride 100 mL infusion, 0-15 mg/hr, IntraVENous, TITRATE, Sandra Johnson MD, Paused at 02/18/23 2315    acetaminophen (TYLENOL) solution 650 mg, 650 mg, Oral, Q4H PRN, Yanira Michael MD, 650 mg at 02/20/23 0401    acetaminophen (TYLENOL) suppository 650 mg, 650 mg, Rectal, Q4H PRN, Yanira Michael MD, 650 mg at 02/17/23 2013    HYDROmorphone (DILAUDID) injection 0.5 mg, 0.5 mg, IntraVENous, Q3H PRN, Ralf MOLINA MD, 0.5 mg at 02/22/23 2225    HYDROmorphone (DILAUDID) injection 1 mg, 1 mg, IntraVENous, Q4H PRN, Ralf MOLINA MD, 1 mg at 02/17/23 1708    heparin 25,000 units in D5W 250 ml infusion, 12-25 Units/kg/hr, IntraVENous, TITRATE, Nancy Stovall MD, Stopped at 02/21/23 0945    albumin human 25% (BUMINATE) solution 12.5 g, 12.5 g, IntraVENous, Q6H, Nancy Stovall MD, 12.5 g at 02/23/23 0524    Warfarin - MD/NP dosing, , Other, Rx Dosing/Monitoring, Danuta Viveros MD    bumetanide [facility]) injection 0.5 mg, 0.5 mg, IntraVENous, Q4H PRN, Danuta Viveros MD, 0.5 mg at 02/17/23 1431    insulin regular (MYXREDLIN, NOVOLIN, HUMULIN) 100 units/100 ml NS infusion (premix), 0-50 Units/hr, IntraVENous, TITRATE, Shaila, Lizette B, NP, Last Rate: 2.2 mL/hr at 02/23/23 0845, 2.2 Units/hr at 02/23/23 0845    glucose chewable tablet 16 g, 4 Tablet, Oral, PRN, Shaila, Lizette B, NP    glucagon (GLUCAGEN) injection 1 mg, 1 mg, IntraMUSCular, PRN, Shaila, Lizette B, NP    insulin lispro (HUMALOG) injection, , SubCUTAneous, TIDAC, Sheldon, Cathy, CNS    sodium chloride (NS) flush 5-40 mL, 5-40 mL, IntraVENous, Q8H, Shaila, Lizette B, NP, 10 mL at 02/23/23 0524    sodium chloride (NS) flush 5-40 mL, 5-40 mL, IntraVENous, PRN, Shaila, Lizette B, NP, 40 mL at 02/17/23 1818    naloxone (NARCAN) injection 0.4 mg, 0.4 mg, IntraVENous, PRN, Shaila, Lizette B, NP    ELECTROLYTE REPLACEMENT NOTE: Nurse to review Serum Potassium and Magnesuim levels and Initiate Electrolyte Replacement Protocol as needed, 1 Each, Other, PRN, Shaila, Lizette B, NP    dextrose 10 % infusion 0-250 mL, 0-250 mL, IntraVENous, PRN, Shaila, Lizette B, NP, Last Rate: 500 mL/hr at 02/23/23 0121, 50 mL at 02/23/23 0121    acetaminophen (TYLENOL) tablet 650 mg, 650 mg, Oral, Q6H PRN, Shaila, Lizette B, NP, 650 mg at 02/17/23 0200    oxyCODONE IR (ROXICODONE) tablet 10 mg, 10 mg, Oral, Q4H PRN, Shaila, Lizette B, NP    ondansetron (ZOFRAN) injection 4 mg, 4 mg, IntraVENous, Q4H PRN, Lalo Lintonin B, NP    albuterol-ipratropium (DUO-NEB) 2.5 MG-0.5 MG/3 ML, 3 mL, Nebulization, Q6H PRN, Lizette Linton B, NP    [Held by provider] aspirin chewable tablet 81 mg, 81 mg, Oral, DAILY, Lizette Linton NP, 81 mg at 02/17/23 0942    senna-docusate (PERICOLACE) 8.6-50 mg per tablet 1 Tablet, 1 Tablet, Oral, DAILY, RUBENS Linton B, NP, 1 Tablet at 02/23/23 0911    polyethylene glycol (MIRALAX) packet 17 g, 17 g, Oral, DAILY, Shaila, Lizette B, NP, 17 g at 02/23/23 0911    bisacodyL (DULCOLAX) suppository 10 mg, 10 mg, Rectal, DAILY PRN, Shaila, Lizette B, NP, 10 mg at 02/22/23 0839    0.45% sodium chloride infusion, 10 mL/hr, IntraVENous, CONTINUOUS, Jack Del Toro MD, Last Rate: 10 mL/hr at 02/23/23 0755, 10 mL/hr at 02/23/23 0755    DOBUTamine (DOBUTREX) 500 mg/250 mL (2,000 mcg/mL) infusion, 0-10 mcg/kg/min, IntraVENous, TITRATE, Ashia Johnson MD, Last Rate: 1.7 mL/hr at 02/23/23 0929, 1 mcg/kg/min at 02/23/23 0929    dexmedeTOMidine in 0.9 % NaCl (PRECEDEX) 400 mcg/100 mL (4 mcg/mL) infusion soln, 0.1-1.5 mcg/kg/hr, IntraVENous, TITRATE, Ashia Johnson MD, Last Rate: 5.7 mL/hr at 02/23/23 0943, 0.4 mcg/kg/hr at 02/23/23 0943    0.9% sodium chloride infusion, 9 mL/hr, IntraVENous, CONTINUOUS, Ashia Johnson MD, Last Rate: 11 mL/hr at 02/23/23 0756, 11 mL/hr at 02/23/23 0756    EPINEPHrine (ADRENALIN) 10 mg in 0.9% sodium chloride 250 mL infusion, 0-10 mcg/min, IntraVENous, TITRATE, Lizette Linton NP, Last Rate: 6 mL/hr at 02/23/23 0929, 4 mcg/min at 02/23/23 0929    NOREPINephrine (LEVOPHED) 32,000 mcg in dextrose 5% 250 mL (128 mcg/mL) infusion, 0.5-16 mcg/min, IntraVENous, TITRATE, Shaila, Lizette B, NP, Stopped at 02/22/23 1500    PATIENT CARE TEAM:  Patient Care Team:  Erin Broderick MD as PCP - General (Family Medicine)  Erin Broderick MD as PCP - [address] [place] [address] Provider  Jaime Eason MD (Cardiovascular Disease Physician)  Ivonne Tee MD (Gastroenterology)  Krishna Bell MD (Cardiothoracic Surgery)  Fausto Sofia MD (Cardiovascular Disease Physician)  Paola Fleming MD (Nephrology)  Tala Rodgers MD (Pulmonary Disease)  Jack Del Toro MD ([address] [place] [address] Vascular Surgery)     Thank you for allowing me to participate in this patient's care. Total Critical Care time spent:   65 minutes.  There was no overlap with other services        Critical care was necessary to treat or prevent imminent or life threatening deterioration of the following conditions: cardiac failure, respiratory failure and CNS failure or compromise     Services Provided:  1. Telemetry review and 12 lead ECG interpretation  2. Hemodynamic interpretation, assessment, and management  3. Review and interpretation of CXR  4. Review and interpretation of lab values  5. Review and interpretation of microbiologic data and culture results  6. Review of medications and administration  7. Review and interpretation of nutrition requirements and management  8. Discussion of management withother consultants and services  9.  Clinical update to family members        Luz Elena García NP   [address] [place] [address], [address]  Phone: [phone]

## 2023-02-23 NOTE — DIABETES MGMT
[facility]  DIABETES MANAGEMENT CONSULT    Consulted by  Porsha Steen MD   for advanced nursing evaluation and care for inpatient blood glucose management. Evaluation and Action Plan   Mariposa Osorio is a 71year old gentleman, with Type 2 Diabetes with a recent A1C of 7.7%, who is admitted with arrhythmias and acute on chronic HFrEF with failure to respond to inotrope support. He was discharged one week prior from a prolonged hospital stay for acute on chronic HF and LVAD work-up and discharged home on dobutamine with a lifevest.  Glucose control was tenuous during his previous admission s/t multiple co-morbidities, several tests/procedures requiring NPO status, waxing and waining oral intake. At this time glucose is impacted by:  Heart Failure with reduced EF 25-30%, LVAD implant  Vasopressor use  TANNER on CVVHD  Enteral Feeds    This admission, he required low basal doses but high bolus doses with meals to offset pre-prandial hyperglycemia. An insulin gtt has been used for glycemic control post-operatively and insulin rates have been erratic over the last 24h- especially with escalation of vasopressors in the last 24h. Please continue insulin gtt until vasopressors wean and insulin doses stabilize. Action Plan    Continue insulin gtt            Initial Presentation   Mariposa Osorio is a 71 y.o. male who presented to the ED 1/26/23 with lower extremity swelling and difficulty breathing. He had a prolonged hospital admission from 12/22/22-1/19/23 for acute on chronic systolic HF and LVAD work-up. He was discharged with home inotrope. LAB: , GFR 58, Trop 2003, BNP 4524  CXR: New diffuse bilateral increased interstitial markings, partially obscuring bilateral pulmonary nodules.      HX:   Past Medical History:   Diagnosis Date    CAD (coronary artery disease) 11/10/2016    NSTEMI & 2 stents    Deafness 10/28/2012    DM (diabetes mellitus) ([facility] [address]     Elevated cholesterol     Hypertension     NSTEMI (non-ST elevated myocardial infarction) ([facility] [address] 11/10/2016        INITIAL DX:   CHF (congestive heart failure) ([facility] [address] [I50.9]     Current Treatment     TX: Milrinone, Amio. Specialty consultations: [facility], Cardiology, EP, GI     Hospital Course   Clinical progress has been complicated by:    1/76: Admission. Rapid response for SVT- Given adenosine x2, amio bolus/gtt  1/27: Advanced HFC consult. EP consult ordered. Intolerance of inotropic support. Cardiology consult. NSTEMI, heparin gtt started. Seen by EP: Continue amio. 1/28: Febrile: CT chest 1/28 shows diffuse focal opacities concerning for pneumonia. Obtain blood cultures, UA. Pathology report 1/18/23: well differentiated neuroendocrine tumor grade 1. EGD: Patchy erythema gastric body, biopsies done. 6 mm sessile polyp gastric body biopsied  1/30: Oncology consult: Recommend multiphase CT of the abdomen and pelvis to evaluate for metastatic spread. Tachycardia and SOB, BiPAP overnight. 2/3: Accepted for VAD implant if renal fx improves per [facility]. CCU Transfer and swan placed  2/4: PRBC transfusion   2/6: With increased dyspnea. Doubtamine started  2/15: LVAD Implant  2/17: Extubated. CRRT started. ECHO with persistent RV failure. OR for RV impella. Remained Intubated post-op  2/20: UA with large leuk esterase and 2+ bacteria.  Urine Cx with yeast  2/21: PRBC transfusion, PLT transfusion    2/22: Bronch   Diabetes History   Type 2 Diabetes  Ambulatory BG management provided by: PCP Griffin Saab MD    Diabetes-related Medical History  Acute complications  Acute hyperglycemia  Neurological complications  Peripheral neuropathy  Microvascular disease  Nephropathy  Macrovascular disease  CAD  Other associated conditions                                       CHF     Diabetes Medication History  Key Antihyperglycemic Medications        Diabetes Medication History  Key Antihyperglycemic Medications metFORMIN (GLUCOPHAGE) 500 mg tablet (Taking/) Take 1 Tablet by mouth two (2) times daily (with meals) for 30 days. glipiZIDE (GLUCOTROL) 5 mg tablet (Taking) Take 1 tablet by mouth twice daily    Januvia 50 mg tablet (Taking) Take 1 tablet by mouth once daily             Diabetes self-management practices:   Eating pattern                Eats 3 small meals daily  [x]         Breakfast                         2 Eggs, Coffee  [x]         Lunch                               [place]  [x]         Dinner                              \" Food\" Bread, rice, lentils   [x]         Bedtime                           [place]  [x]         Snacks                              Afternoon snack  [x]         Beverages                        Water, Coffee  Physical activity pattern                Sedentary   Monitoring pattern  Discharged last week with a [place] CGM and serum glucometer  7 day average Ba-9a: 129-164  9a-12: 243  Noon to 9p: 198-238  1 low BG in the last week overnight    Taking medications pattern  [x]         Consistent administration  [x]         Affordable  Social determinants of health impacting diabetes self-management practices   Concerned that you need to know more about how to stay healthy with diabetes  Overall evaluation:                 [x]         Achieving A1c target with drug therapy & self-care practices    Subjective     Objective   Physical exam  General Underweight [place] ([facility]) male in critical condition in CVICU. Intubated. LVAD. RV Impella, CVVHD  Neuro  Sedated   Vital Signs Visit Vitals  BP (!) 70/50   Pulse 90   Temp 100 °F (37.8 °C)   Resp 21   Ht 5' 6\" (1.676 m)   Wt 69.4 kg (153 lb)   SpO2 96%   BMI 24.69 kg/m²     Skin  Warm and dry. No acanthosis noted along neckline. Sternal surgical incision. Chest tubes. LVAD  Heart   Regular rate and rhythm.  No murmurs, rubs or gallops  Lungs  Clear to auscultation without rales or rhonchi  Extremities No foot wounds        Laboratory  Recent Labs     02/23/23  0310 02/22/23  1628 02/22/23  0300 02/21/23  1547 02/21/23  0213   * 155* 120*   < > 126*   AGAP 6 6 6   < > 7   WBC 9.4  --  8.7  --  5.6   CREA 1.92* 1.94* 1.81*   < > 2.07*   AST 76*  --  102*  --  235*   ALT 12  --  15  --  32    < > = values in this interval not displayed. Factors impacting BG management  Factor Dose Comments   Nutrition:  Standard meals     Enteral feeds  Vital 1.5 @ 35 ml/hr  153g CHO/24h      Heart Failure/Cardiogenic Shock HeartMate 3 LVAD  Dobutamine: Off   Epinephrine   Norepi    Lactic Acidosis     Septic Shock UTI/PNA  Urine Cx 2/20 with yeast  IV antibiotics   Fevers    Other:   Kidney function TANNER on CKD:   Current GFR 37  CVVHD    Shock Liver LFTs- improving      Blood glucose pattern    Significant diabetes-related events over the past 24-72 hours  A1C 7.7% 1/11/23  Pre-op: Basal: Lantus 6 units daily and Bolus: 10 units Humalog/meal  Insulin gtt:   2/15: 2.8 units  2/16: 32.8 units  2/17: 23.1 units  2/18: 19.9 units  2/19: 8.7 units  2/20: 53.6 units  2/21: 99 units   2/22: 94.9 units  2/23: 43.1 units since MN    Gtts: Epi (4)    Assessment and Nursing Intervention   Nursing Diagnosis Risk for unstable blood glucose pattern   Nursing Intervention Domain 4162 Decision-making Support   Nursing Interventions Examined current inpatient diabetes/blood glucose control   Explored factors facilitating and impeding inpatient management  Explored corrective strategies with patient and responsible inpatient provider   Informed patient of rational for insulin strategy while hospitalized     Billing Code(s)   No charge    Before making these care recommendations, I personally reviewed the hospitalization record, including notes, laboratory & diagnostic data and current medications, and examined the patient at the bedside (circumstances permitting) before determining care.  More than fifty (50) percent of the time was spent in patient counseling and/or care coordination.   Total minutes: 13    Dale Kristi, CNS  Diabetes Clinical Nurse Specialist  [facility]  Access via "[employer] Serve

## 2023-02-24 NOTE — DIABETES MGMT
[facility]  DIABETES MANAGEMENT CONSULT    Consulted by  Conchis Brewer MD   for advanced nursing evaluation and care for inpatient blood glucose management. Evaluation and Action Plan   Hanna Ingram is a 71year old gentleman, with Type 2 Diabetes with a recent A1C of 7.7%, who is admitted with arrhythmias and acute on chronic HFrEF with failure to respond to inotrope support. He was discharged one week prior from a prolonged hospital stay for acute on chronic HF and LVAD work-up and discharged home on dobutamine with a lifevest.  Glucose control was tenuous during his previous admission s/t multiple co-morbidities, several tests/procedures requiring NPO status, waxing and waining oral intake. At this time glucose is impacted by:  Heart Failure with reduced EF 25-30%, LVAD implant  Vasopressor use  TANNER on CVVHD  Enteral Feeds    This admission, he required low basal doses but high bolus doses with meals to offset pre-prandial hyperglycemia. An insulin gtt has been used for glycemic control post-operatively and insulin rates have been erratic over the last 24h- especially with escalation of vasopressors in the last 24h. Please continue insulin gtt until vasopressors wean and insulin doses stabilize. Action Plan    Continue insulin gtt. Initial Presentation   Hanna Ingram is a 71 y.o. male who presented to the ED 1/26/23 with lower extremity swelling and difficulty breathing. He had a prolonged hospital admission from 12/22/22-1/19/23 for acute on chronic systolic HF and LVAD work-up. He was discharged with home inotrope. LAB: , GFR 58, Trop 2003, BNP 4524  CXR: New diffuse bilateral increased interstitial markings, partially obscuring bilateral pulmonary nodules.      HX:   Past Medical History:   Diagnosis Date    CAD (coronary artery disease) 11/10/2016    NSTEMI & 2 stents    Deafness 10/28/2012    DM (diabetes mellitus) ([facility] [address]     Elevated cholesterol     Hypertension     NSTEMI (non-ST elevated myocardial infarction) ([facility] [address] 11/10/2016        INITIAL DX:   CHF (congestive heart failure) ([facility] [address] [I50.9]     Current Treatment     TX: Milrinone, Amio. Specialty consultations: [facility], Cardiology, EP, GI     Hospital Course   Clinical progress has been complicated by:    4/14: Admission. Rapid response for SVT- Given adenosine x2, amio bolus/gtt  1/27: Advanced HFC consult. EP consult ordered. Intolerance of inotropic support. Cardiology consult. NSTEMI, heparin gtt started. Seen by EP: Continue amio. 1/28: Febrile: CT chest 1/28 shows diffuse focal opacities concerning for pneumonia. Obtain blood cultures, UA. Pathology report 1/18/23: well differentiated neuroendocrine tumor grade 1. EGD: Patchy erythema gastric body, biopsies done. 6 mm sessile polyp gastric body biopsied  1/30: Oncology consult: Recommend multiphase CT of the abdomen and pelvis to evaluate for metastatic spread. Tachycardia and SOB, BiPAP overnight. 2/3: Accepted for VAD implant if renal fx improves per [facility]. CCU Transfer and swan placed  2/4: PRBC transfusion   2/6: With increased dyspnea. Doubtamine started  2/15: LVAD Implant  2/17: Extubated. CRRT started. ECHO with persistent RV failure. OR for RV impella. Remained Intubated post-op  2/20: UA with large leuk esterase and 2+ bacteria.  Urine Cx with yeast  2/21: PRBC transfusion, PLT transfusion    2/22: Bronch   Diabetes History   Type 2 Diabetes  Ambulatory BG management provided by: PCP Francis Chen MD    Diabetes-related Medical History  Acute complications  Acute hyperglycemia  Neurological complications  Peripheral neuropathy  Microvascular disease  Nephropathy  Macrovascular disease  CAD  Other associated conditions                                       CHF     Diabetes Medication History  Key Antihyperglycemic Medications        Diabetes Medication History  Key Antihyperglycemic Medications metFORMIN (GLUCOPHAGE) 500 mg tablet (Taking/) Take 1 Tablet by mouth two (2) times daily (with meals) for 30 days. glipiZIDE (GLUCOTROL) 5 mg tablet (Taking) Take 1 tablet by mouth twice daily    Januvia 50 mg tablet (Taking) Take 1 tablet by mouth once daily             Diabetes self-management practices:   Eating pattern                Eats 3 small meals daily  [x]         Breakfast                         2 Eggs, Coffee  [x]         Lunch                               [place]  [x]         Dinner                              \" Food\" Bread, rice, lentils   [x]         Bedtime                           [place]  [x]         Snacks                              Afternoon snack  [x]         Beverages                        Water, Coffee  Physical activity pattern                Sedentary   Monitoring pattern  Discharged last week with a [place] CGM and serum glucometer  7 day average Ba-9a: 129-164  9a-12: 243  Noon to 9p: 198-238  1 low BG in the last week overnight    Taking medications pattern  [x]         Consistent administration  [x]         Affordable  Social determinants of health impacting diabetes self-management practices   Concerned that you need to know more about how to stay healthy with diabetes  Overall evaluation:                 [x]         Achieving A1c target with drug therapy & self-care practices    Subjective     Objective   Physical exam  General Underweight [place] ([facility]) male in critical condition in CVICU. Intubated. LVAD. RV Impella, CVVHD  Neuro  Sedated   Vital Signs Visit Vitals  BP (!) 82/59   Pulse 80   Temp 98.1 °F (36.7 °C)   Resp 9   Ht 5' 6\" (1.676 m)   Wt 68.1 kg (150 lb 2.1 oz)   SpO2 96%   BMI 24.23 kg/m²     Skin  Warm and dry. No acanthosis noted along neckline. Sternal surgical incision. Chest tubes. LVAD  Heart   Regular rate and rhythm.  No murmurs, rubs or gallops  Lungs  Clear to auscultation without rales or rhonchi  Extremities No foot wounds        Laboratory  Recent Labs     02/24/23  0259 02/23/23  1637 02/23/23  0310 02/22/23  1628 02/22/23  0300   * 174* 133*   < > 120*   AGAP 7 5 6   < > 6   WBC 9.6  --  9.4  --  8.7   CREA 1.71* 1.94* 1.92*   < > 1.81*   AST 61*  --  76*  --  102*   ALT 14  --  12  --  15    < > = values in this interval not displayed.          Factors impacting BG management  Factor Dose Comments   Nutrition:  Standard meals     Enteral feeds  Vital 1.5 @ 35 ml/hr  153g CHO/24h      Heart Failure/Cardiogenic Shock HeartMate 3 LVAD  Dobutamine  Epinephrine   Norepi- weaning    Lactic Acidosis     Septic Shock UTI/PNA  Urine Cx 2/20 with yeast  IV antibiotics   Fevers    Other:   Kidney function TANNER on CKD:   Current GFR 42  CVVHD    Shock Liver LFTs- improving      Blood glucose pattern    Significant diabetes-related events over the past 24-72 hours  A1C 7.7% 1/11/23  Pre-op: Basal: Lantus 6 units daily and Bolus: 10 units Humalog/meal  Insulin gtt:   2/15: 2.8 units  2/16: 32.8 units  2/17: 23.1 units  2/18: 19.9 units  2/19: 8.7 units  2/20: 53.6 units  2/21: 99 units   2/22: 94.9 units  2/23: 69.1 units  2/24: 34 units since MN    Gtts: Epi (4)  May need an impella exchange vs wean to off    Assessment and Nursing Intervention   Nursing Diagnosis Risk for unstable blood glucose pattern   Nursing Intervention Domain 5250 Decision-making Support   Nursing Interventions Examined current inpatient diabetes/blood glucose control   Explored factors facilitating and impeding inpatient management  Explored corrective strategies with patient and responsible inpatient provider   Informed patient of rational for insulin strategy while hospitalized     Billing Code(s)   30003    Before making these care recommendations, I personally reviewed the hospitalization record, including notes, laboratory & diagnostic data and current medications, and examined the patient at the bedside (circumstances permitting) before determining care. More than fifty (50) percent of the time was spent in patient counseling and/or care coordination.   Total minutes: 25    SLICK Rutherford  Diabetes Clinical Nurse Specialist  [facility] Diabetes [facility]  Access via [employer]

## 2023-02-24 NOTE — PROGRESS NOTES
NICOM Hemodynamic Monitoring     Visit Vitals  BP (!) 82/59   Pulse 80   Temp 98.1 °F (36.7 °C)   Resp 9   Ht 5' 6\" (1.676 m)   Wt 68.1 kg (150 lb 2.1 oz)   SpO2 96%   BMI 24.23 kg/m²         Baseline assessment:        CO 4.78        CI 2.7        SVI 34        SVV 0        TPR 0     Not calculated

## 2023-02-24 NOTE — ADT AUTH CERT NOTES
Comment          Patient Demographics    Patient Name   Jose G Styles   [id number] Legal Sex   Male    1950 Address   [address] [place] [address] Phone   [phone] (Home)   [phone] (Mobile) *Preferred*     Patient Demographics    Patient Name   Jose G Styles   [id number] Legal Sex   Male    1950 Address   [address] Phone   [phone] (Home)   [phone] (Mobile) *Preferred*     CSN:   [id number]     [address] [place] [address] Date: Admit Time Room Bed   2023  4:17  3691     Attending Providers    Provider Pager From To   Ketan Castillo MD  23   Eder Burroughs MD  23   Nettie Mosely MD  23   Eder Burroughs MD  23   Nettie Mosley MD  23   Joseph Castleman, MD  23   Sixto Oconnor MD  23      Patient Contacts    Name Relation Home Work Mobile   Susanna Kendall Spouse   [phone]   [address] [place] [address] Daughter [phone]   Hal Kendall Son [phone]     Marlen Part Daughter-in-Law [phone]      Utilization Reviews       Cardiovascular Surgery or Procedure GRG - Care Day 1 (2023) by Jim Diaz RN       Review Entered Review Status   2023 1521 Completed      Criteria Review      Care Day: 1 Care Date: 2023 Level of Care: ICU    Guideline Day 2    Level Of Care    ( ) Floor    2023 15:19:23 EST by Jenny Garcia       icu    Clinical Status    ( ) * No ICU or intermediate care needs    2023 15:19:23 EST by Jenny Garcia      remain intubated impella in place   96.1 74 82/9 26 96% 68.1 kg fio2 40%   glucose 117 117 137 127 90 108 102 120   H&H 7.6 23.4 platelets 93 inr 1.7 bun 22 cr 1.71 phos 2.2 mag 3   ptt 53 61.6 t bili 15.9 alt 61 ld 623 nt pro bnp 10 977    Interventions    (X) Inpatient interventions continue    2023 15:21:14 EST by Jeanette Villarreal      iv zosyn 3.375 gm q8h og feeding vital 1.5 continuous npo daily labs phosphorusand magnesium  q12h vbg and cxr daily glucose q1h    2023 15:19:23 EST by Jeanette Villarreal      1/ ns @ 10 ns @ 11 iv albumin 12.5 mg q6h alteplase 3 mcg titrate impella ccrt cont precedex, adrenalin, fentanyl and levophed   drip titrate iv d10 75 mL x1 iv diflucan 400mg q24h heparin and insulin drips  titrate iv flagyl 500mg q12h    * Milestone   Additional Notes   23 LOC IP ICU       Pod #9    POD 9   LVAD with good flows   Impella current trending up   LFTs downtrending   T Bili down slightly to 15    Lactic acid WNL   PCT trending down    CVP 8-9       Constitutional:        Appearance: He is ill-appearing. Interventions: He is sedated and intubated. Cardiovascular:       Rate and Rhythm: Normal rate and regular rhythm. Comments: LVAD Humm noted on auscultation    Pulmonary:       Effort: Pulmonary effort is normal. No respiratory distress. He is intubated. Breath sounds: Rhonchi present. Comments: Coarse lung sounds   Abdominal:       General: Bowel sounds are decreased. There is no distension. Comments: Driveline exit site with dressing C/D/I    Musculoskeletal:       Right lower le+ Pitting Edema present. Left lower le+ Pitting Edema present. Comments: Left groin Impella in place, dressing C/D/I    Skin:      Capillary Refill: Capillary refill takes more than 3 seconds. Coloration: Skin is jaundiced.            Continue Impella RP at P7, HM3 LVAD at 4600rpms   May need to exchange RP- will d/w Dr. Denisha Garcia   Off Dobutamine   Keep Epi at [address], no weaning today   Levophed to keep MAP > 65   CVP goal 12-15   Check NICOM q 4 hours   TTE ordered   No beta-blockers due to hypotension and RV conditioning prior to LVAD   Cannot tolerate ARB/ARNi due to hypotension and upcoming surgical procedure    Cannot tolerate spironolactone due to hyperkalemia Cannot tolerate jardiance due to significant diuresis on smallest dose/contributed to IVVD   Previously discontinued corlanor due to SVT   Digoxin stopped d/t risk for toxicity with amio loading   Discontinued amio due to intermitted heart blocks under pacemaker   Nephrology consult appreciated, cont CRRT factor to 50 as tolerated   Keep K+ >4 and Mg >2   Transfuse to keep hgb > 7   Continue broad spectrum abx   Blood cx NGTD   Sputum NGTD   Urine- yeast    Start Diflucan 400mg daily    Start colchicine 0.6mg daily for possible pericarditis    Hold ASA d/t thrombocytopenia- improving    Continue coumadin tonight, INR goal 2-3   Cont heparin gtt today   HIT panel negative    Continue TF    Continue bowel regimen    Daily dressing changes to Drive line exit site   Pulmonary hygiene    Timing of extubation per intensivist- would cont SBT for now      Per nephrology    Seen on CVVHD. Factor rate-> at 50ml/hr. Ct 4K Prismasol bags. Levophed/Epi   Diflucan   Consider RP exchange.     Intermittent bladder scans   Q12hr CRRT labs   Avoid nephrotoxins                  Cardiovascular Surgery or Procedure GRG - Clinical Indications for Procedure by Jim Diaz RN       Review Entered Review Status   2/24/2023 1512 Completed      Criteria Review      Clinical Indications for Procedure    Most Recent : Jenny Garcia Most Recent Date: 2/24/2023 15:12:50 EST    ( ) Surgery or other procedure covered by this guideline is indicated for  1 or more  of the following    :       ( ) Intra-aortic balloon pump or temporary (eg, non-implanted) ventricular assist device [H] needed,       as indicated by  1 or more  of the following  (78) (79) (81) (82):          ( ) Life-threatening or severe cardiac dysfunction (eg, cardiogenic shock, acute heart failure)          and  ALL  of the following :             (X) Clinical improvement (ie, improved cardiac function) is anticipated (within reasonable degree             of clinical certainty) with further medical or procedural treatment (care is not             felt to be futile). 2/24/2023 15:12:50 EST by Ana Kellogg               2/18 1. Cardiogenic shock secondary to right ventricular failure following placement of left ventricular assist support device. 2.  Acute respiratory failure. Heart Failure - Care Day 26 (2/20/2023) by Marc Gaytan       Review Entered Review Status   2/20/2023 1517 Completed      Criteria Review      Care Day: 26 Care Date: 2/20/2023 Level of Care: ICU    Guideline Day 2    Level Of Care    ( ) Floor    2/20/2023 15:17:41 EST by Marc Gaytan      ICU    Clinical Status    ( ) * Hemodynamic stability    ( ) * Mental status at baseline    ( ) * No evidence of myocardial ischemia    ( ) * Cardiac rate and rhythm acceptable    ( ) * Oxygenation at baseline or improved    ( ) * Pulmonary edema absent or improved    Routes    (X) Taper parenteral medications    2/20/2023 15:17:41 EST by Marc Gaytan      0.45% NS 10mL/hr IV infusion  0.9% NS 11mL/hr IV infusion  Tylenol 650mg Q4 PRN PO x 1  Albumin 12.5g Q6 IV  Precedex 1mcg/kg/hr  IV  Dobutamine 7mcg/kg/min IV  Epi 5mcg/min IV  Fentanyl 200mcg/hr titrate IV  Heparin 6u/kg/hr IV  Regular insulin 3u/hr IV    ( ) Oral diet    2/20/2023 15:17:41 EST by Marc Gaytan      ADULT TUBE FEEDING Orogastric; Other Tube Feeding (Specify); Vital 1.5; Delivery Method: Continuous; Continuous Initial Rate (mL/hr): 35; Continuous Advance Tube Feeding: No; Water Flush Volume (mL): 30; Water Flush Frequency: Q 4 hours; Modulars/...     Interventions    ( ) * Pulmonary catheter absent    (X) Possible CXR, ECG, BNP    (X) Oxygen    2/20/2023 15:17:41 EST by Angie Machado on FiO2 70%    * Milestone   Additional Notes    ICU 2/20 IP      Pertinent Updates:   Febrile overnight to 101   CRRT clotted overnight   More hypotensive   Acidosis improved   No low flow alarms   Maintaining good RV and LV flows   Making small amts urine again   LFTs downtrending   T Bili trending up   Lactic acid 1.7 today   PCT 8   LDH trending down with impella at p6   T-bili up today (suspect hemolysis contributing in addition to resolving ischemic vs congestive hepatopathy)          Vitals:   BP (!) 89/65 Comment: ARTERIAL LINE   Pulse 85   Temp 99.5 °F (37.5 °C)   Resp 18   Ht 5' 6\" (1.676 m)   Wt 68.9 kg (152 lb)   SpO2 97% Comment: via blood gas   BMI 24.53 kg/m²    O2 Flow Rate (L/min): 20 l/min O2 Device: Endotracheal tube, Heated, Ventilator Temp (24hrs), Av.5 °F (37.5 °C), Min:97.7 °F (36.5 °C), Max:102.2 °F (39 °C)       CVP (mmHg): 17 mmHg (23 1100)        Abnl/Pertinent Labs/Radiology/Diagnostic Studies:   23 04:37   RBC: 2.93 (L)   HGB: 8.0 (L)   HCT: 25.9 (L)   MCV: 88.4   MCH: 27.3   MCHC: 30.9   RDW: 21.0 (H)   PLATELET: 54 (L)    04:34   Sodium: 138   Potassium: 4.2   Chloride: 105   CO2: 24   Anion gap: 9   Glucose: 164 (H)   BUN: 33 (H)   Creatinine: 2.45 (H)   BUN/Creatinine ratio: 13   Calcium: 9.0   Phosphorus: 4.2   Magnesium: 2.3   eGFR: 27 (L)   Bilirubin, total: 11.5 (H)   Protein, total: 5.6 (L)   Albumin: 3.8   Globulin: 1.8 (L)   A-G Ratio: 2.1   ALT: 82 (H)   AST: 459 (H)   Alk. phosphatase: 50   LD: 769 (H)   NT pro-BNP: 6,809 (H)   CRP, High sensitivity: >9.5   Procalcitonin: 7.63      23 04:37   INR: 1.9 (H)   Prothrombin time: 18.9 (H)   aPTT: 57.0 (H)   aPTT, therapeutic range:          Lactic 2.6   Lactic 2.7      CXR: Stable support apparatus. Stable bilateral lung opacities and small    pleural effusions. Physical Exam:   Constitutional:        Appearance: He is ill-appearing. Interventions: He is sedated and intubated. Cardiovascular:       Rate and Rhythm: Normal rate and regular rhythm. Comments: LVAD Humm noted on auscultation    Pulmonary:       Effort: Pulmonary effort is normal. No respiratory distress. He is intubated. Breath sounds: Rhonchi present. Comments: Coarse lung sounds   Abdominal:       General: There is distension. Comments: Driveline exit site with dressing C/D/I    Musculoskeletal:       Right lower le+ Pitting Edema present. Left lower le+ Pitting Edema present. Comments: Left groin Impella in place, dressing C/D/I    Skin:      Capillary Refill: Capillary refill takes more than 3 seconds      Cardiology:   Continue Impella RP at P6, HM3 LVAD at 4800rpms   Repeat TTE tomorrow    Continue dobutamine 7 mcg/kg/min   Wean Epi as tolerated   Levophed to keep MAP > 65   CVP goal 12-15   No beta-blockers due to hypotension and RV conditioning prior to LVAD   Cannot tolerate ARB/ARNi due to hypotension and upcoming surgical procedure    Cannot tolerate spironolactone due to hyperkalemia   Cannot tolerate jardiance due to significant diuresis on smallest dose/contributed to IVVD   Previously discontinued corlanor due to SVT   Digoxin stopped d/t risk for toxicity with amio loading   Discontinued amio due to intermitted heart blocks under pacemaker   Nephrology consult appreciated, resume CRRT   Keep K+ >4 and Mg >2   Transfuse to keep hgb > 7   Perioperative antibiotics per protocol   Start broad spectrum abx   Pan culture pending    Hold ASA d/t thrombocytopenia   Warfarin 2mg tonight; monitor INR daily    Maintain heparin gtt until 2 days with consecutive therapeutic INR   Noted drop in Plts, HIT panel sent per intensivist- although suspicion is for consumptive thrombocytopenia; if HIT panel + or further drop in plt, then can switch to bivalrudin if INR not therapeutic   Continue TF    Continue bowel regimen    Timing of extubation per intensivist   D/w Dr. Law Waggoner and bedside RN       Assessments & Plans:   Remains intubated, sedated. Fever and hypotension overnight despite good Impella and LVAD support with appropriate flows, unable to tolerate CRRT due to hypotension.   Increased vasopressor support with improved MAP. Repeat cultures obtained, given gentle IV resuscitation and antibiotics broadened. Remains on Impella P7 LVAD at 4800 with 3 L flow. Minimal urine output.    NEUROLOGICAL:    Propofol, Precedex, fentanyl gtt   Goal RASS 0 to -1   RASHAD, we will keep intubated until more hemodynamically stable   Delirium precautions   Encourage work w/ PT/OT once  extubated       PULMONOLOGY:   Lung protective ventilation   SBT when more hemodynamically stable   Monitor blood gas       CARDIOVASCULAR:   On Levophed, vasopressin, epinephrine, dobutamine support at this time   Trend lactic acid, hemodynamics, and wean above as tolerated   NICOM trend   Repeat echo this morning, follow results   Status post Impella RP placement, P-7   LVAD  3L @ 4800 RPM   Heparin for anticoagulation, Coumadin bridge   CVP goal around 15   Lct normalized post Impella and CRRT, trend   Monitor chest tube output   V  paced to 86 due to CHB, monitor for return of underlying rhythm   C/w ASA, statin   Goal euvolemia   Unable to tolerate BB, ARNI/ARB due to hypotension, aldactone held 2/2 elevated K        GASTROINTESTINAL:   NPO, on tube feeds   PPI             RENAL/ELECTROLYTE/FLUIDS:   Strict I/Os,  goal  euvolemia   Off CRRT most night, resume CRRT, maintain net even   Monitor for renal recovery   Nephrology following   Monitor RFP   Mg/Phos/K >2/3/4   Vasquez to remain in place       ENDOCRINE:   Insulin gtt while hemodynamically unstable   Resume SSI, Basal insulin when indicated   Hypoglycemic protocol   Glucose goal 120-180       HEMATOLOGY/ONCOLOGY:   Heparin gtt, low suspicion for HIT, follow HIT panel suspect thrombocytopenia likely secondary to shock and Impella   After discussion with heart failure service will continue heparin at this time however low threshold for transition to bival   Hemoglobin goal greater than 7, platelet goal greater than 50   EPO weekly   Gastric neuroendocrine tumor, well differentiated, good prognosis per onc.         ID/MICRO:   Blood cultures, respiratory cultures, UA   Started VANC, Zosyn, Flagyl   Trend procalcitonin          Medications:   Heparin 1700u PRN IK   Heparin 1500u PRN IK   Dilaudid 0.5mg Q3PRN IV x 1   Cardene 5mg/hr IV infusion   Propofol 40mcg/kg/min IV    Sodium bicarb 25mEq D5% IV 1L infusion   Albumin 25g IV x 2   Ancef 2g IV x 1   Ancef 1 g IV Q8   Potassium chloride 20mEq IV x 2   Amiodarone 400mg Q12 PO   Carafate 1g QID PO

## 2023-02-24 NOTE — PROGRESS NOTES
2000 - Bedside and Verbal shift change report given to Hallie Hernández RN (oncoming nurse) by Jimbo Cedeño RN (offgoing nurse). Report included the following information SBAR, Kardex, OR Summary, Procedure Summary, Intake/Output, MAR, Recent Results, and Cardiac Rhythm 1st degree AV block . 2125 - Impella purge flow < 2 and purge pressure > 1000. Dr. Regi Phillips notififed. Impella rep notified. [employer] notified. Per Impella rep, monitor motor current. If there is a jump in motor current, impella is clotting off. If Impella clots off and shuts down, try restarting the Impella at P-2 three times. If unable to manually restart, cardiac surgery will need to replace Impella. 2200 - HF team made aware of Impella purge flows and purge pressure. 0800 - Bedside and Verbal shift change report given to Josseline Orta RN (oncoming nurse) by Hallie Hernández RN (offgoing nurse). Report included the following information SBAR, Kardex, OR Summary, Procedure Summary, Intake/Output, MAR, Recent Results, and Cardiac Rhythm 1st degree AV block .

## 2023-02-24 NOTE — PROGRESS NOTES
[facility] in [place], [place]  Inpatient Progress Note      Patient name: Hanna Ingram  Patient : 1950  Patient MRN: [id number]  Consulting MD: Dre Salguero MD  Date of service: 23    REASON FOR REFERRAL:  Management of LVAD     PLAN OF CARE:  70 y/o male with likely combined non-ischemic and ischemic cardiomyopathy, LVEF 25-30%, stage D, NYHA class IV now s/p HM3 LVAD as DT 2/15/23 by Dr. Terence Harrington  C/b RV failure requiring Impella RP placed 23  C/b acute on chronic renal failure, requiring CRRT  C/b hepatic failure  C/b lactic acidosis  C/b septic shock   Patient was approved for LVAD implantation as destination therapy at [facility] 2/3/23; the following have been identified and d/w patient as relative concerns pertaining to LVAD candidacy: small body surface area, cachexia, BMI 18, malnutrition, frailty, advanced age, muscular deconditioning, PVD (fingertips), urinary retention requiring donnelly catheter, neuroendocrine tumor class 1 requiring treatment after LVAD, mild neurocognitive dysfunction, chronic kidney disease and hearing loss requiring hearing aid         RECOMMENDATIONS:  Continue Impella RP at P7, HM3 LVAD at 4600rpms  May need to exchange RP- will d/w Dr. Terence Harrington  Off Dobutamine  Keep Epi at [address], no weaning today  Levophed to keep MAP > 65  CVP goal 12-15  Check NICOM q 4 hours  TTE ordered  No beta-blockers due to hypotension and RV conditioning prior to LVAD  Cannot tolerate ARB/ARNi due to hypotension and upcoming surgical procedure   Cannot tolerate spironolactone due to hyperkalemia  Cannot tolerate jardiance due to significant diuresis on smallest dose/contributed to IVVD  Previously discontinued corlanor due to SVT  Digoxin stopped d/t risk for toxicity with amio loading  Discontinued amio due to intermitted heart blocks under pacemaker  Nephrology consult appreciated, cont CRRT factor to 50 as tolerated  Keep K+ >4 and Mg >2  Transfuse to keep hgb > 7  Continue broad spectrum abx  Blood cx NGTD  Sputum NGTD  Urine- yeast   Start Diflucan 400mg daily   Start colchicine 0.6mg daily for possible pericarditis   Hold ASA d/t thrombocytopenia- improving   Continue coumadin tonight, INR goal 2-3  Cont heparin gtt today  HIT panel negative   Continue TF   Continue bowel regimen   Daily dressing changes to Drive line exit site  Pulmonary hygiene   Timing of extubation per intensivist- would cont SBT for now  D/w Dr. Thuan Nelson, nephrology, Dr. Tania Bernabe and bedside RN and family         INTERVAL HISTORY:  POD 9  LVAD with good flows  Impella current trending up  LFTs downtrending  T Bili down slightly to 15   Lactic acid WNL  PCT trending down   CVP 8-9       IMPRESSION:  Acute on chronic combined systolic/diastolic  heart failure  Stage D, NYHA class IV symptoms   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, LVEF 20% (by echo 1/2023) and 23% (by cMRI 1/3/23)  Cardiac MRI suggestive of ischemic cardiomyopathy  PYP equivocal  S/p HeartMate 3  LVAD-DT (2/15/23)  C/b RV failure requiring Impella RP (2/18/23)  C/b franco on CKD requiring CRRT  C/b liver dysfunction (ischemic vs congestive)  Coronary artery disease  CAD s/p CABG x 2: further disease best managed medically due to small vessel size   At risk of sudden cardiac death  Peripheral arterial disease  Bilateral hydronephrosis s/p donnelly  Cardiac risk factors:  HTN  HL  TRISTAN, STOP-BANG 4  DM2  CKD, stage 3  MOCA from 2/9 19/30, consistent with mild cognitive impairment  Hard of hearing  Gastric Neuroendocrine Tumor, elevated gastrin and chromogranin A  Septic shock, source pending           LIFE GOALS:  Lifestyle goals reviewed with the patient.   Patient's personal goals include: having a few more years with family  Important upcoming milestones or family events: None at this time   The patient identifies the following as important for living well: being at home, not being SOB CARDIAC IMAGING:   Echo 2/19/23    Left Ventricle: Severely reduced left ventricular systolic function with a visually estimated EF of 20 - 25%. Not well visualized. Left ventricle size is normal. Increased wall thickness. Unable to assess wall motion. Abnormal diastolic function. LVAD is present. LVAD inflow cannula was not well visualized. Right Ventricle: Not well visualized. Right ventricle is mildly dilated. Moderately reduced systolic function. TAPSE is abnormal.    Mitral Valve: Severe annular calcification at the anterior and posterior leaflets of the mitral valve. Mild regurgitation. Left Atrium: Left atrium is dilated. Right Atrium: Right atrium is dilated. Technical qualifiers: Echo study was technically difficult and technically difficult with poor endocardial visualization. Echo 1/27/23    Left Ventricle: EF by visual approximation is 20%. Left ventricle is mildly dilated. Mitral Valve: Moderately thickened leaflet, at the anterior and posterior leaflets. Moderately calcified leaflet. Moderate regurgitation. Left Atrium: Left atrium is moderately dilated. Technical qualifiers: Echo study was technically difficult with poor endocardial visualization. Contrast used: Definity. Limited study, not all structures viewed     Echo 1/9/23   Left Ventricle: Severely reduced left ventricular systolic function with a visually estimated EF of 25 - 30%. Left ventricle size is normal. Mildly increased wall thickness. Echo 12/26/22    Left Ventricle: Severely reduced left ventricular systolic function with a visually estimated EF of 25 - 30%. Left ventricle size is normal. Normal wall thickness. There are regional wall motion abnormalities. Grade II diastolic dysfunction with increased LAP. Right Ventricle: Moderately reduced systolic function. TAPSE is abnormal. TAPSE is 1.1 cm. Aortic Valve: Mild stenosis of the aortic valve. AV peak gradient is 13 mmHg.  AV peak velocity is 1.8 m/s.    Mitral Valve: Not well visualized. Moderate annular calcification at the posterior leaflet of the mitral valve. Mild to moderate regurgitation. Tricuspid Valve: Mildly elevated RVSP. Left Atrium: Left atrium is moderately dilated. 12/8/22    Left Ventricle: Moderately reduced left ventricular systolic function with a visually estimated EF of 35 - 40%. Severe hypokinesis of the following segments: mid anteroseptal, apical anterior, apical septal, apical inferior and apical lateral. Severe hypokinesis of the apex. Mitral Valve: Severely thickened leaflet, at the anterior and posterior leaflets. Severely calcified leaflet, at the anterior and posterior leaflets. Mild annular calcification of the mitral valve. Moderate regurgitation. Left Atrium: Left atrium is mildly dilated. Contrast used: Definity. limited study     EKG 12/22/22 ST, Biatria enlargement, marked ST abnormality     [facility] 12/6/22  1. Normal LVEDP  2. Severe native multivessel coronary artery disease  3. Patent LIMA to LAD and vein graft to distal RCA  4. Recurrent ISR in OM1 stent with now 60 to 70% restenosis  5. Recoil of left main and circumflex stent with now recurrent 40 to 50% stenosis. 6.  Progression of ostial left main disease now to about 60% stenosis  7. Progression of disease in jailed first marginal branch now with diffuse 90% stenosis  8.   High-grade stenosis in the mid to distal right potential femoral artery treated with 6 x 40 mm impact drug-coated balloon angioplasty to reduce the stenosis to less than 40%        HEMODYNAMICS:  [facility] 1/9/23  PA 20/9, RA 3, PCWP 8, CI 1.8     CPEST too ill   6MW 300 feet       LVAD INTERROGATION:  Device interrogated in person  Device function normal, normal flow, no events  LVAD   Pump Speed (RPM): 4600  Pump Flow (LPM): 2.9  PI (Pulsitility Index): 7.6  Power: 2.9   Test: No  Back Up  at Bedside & Labeled: Yes  Power Module Test: No  Driveline Site Care: No  Driveline Dressing: Clean, Dry, and Intact  Testing  Alarms Reviewed: Yes  Back up SC speed: 4600  Back up Low Speed Limit: 4200  Emergency Equipment with Patient?: Yes  Emergency procedures reviewed?: Yes  Drive line site inspected?: No  Drive line intergrity inspected?: Yes  Drive line dressing changed?: No    PHYSICAL EXAM:  Visit Vitals  BP (!) 82/59   Pulse 79   Temp 97.5 °F (36.4 °C)   Resp 26   Ht 5' 6\" (1.676 m)   Wt 150 lb 2.1 oz (68.1 kg)   SpO2 96%   BMI 24.23 kg/m²     Hemodynamics:   CVP: CVP (mmHg): 7 mmHg (23 0700)          Physical Exam  Vitals and nursing note reviewed. Constitutional:       Appearance: He is ill-appearing. Interventions: He is sedated and intubated. Cardiovascular:      Rate and Rhythm: Normal rate and regular rhythm. Comments: LVAD Humm noted on auscultation   Pulmonary:      Effort: Pulmonary effort is normal. No respiratory distress. He is intubated. Breath sounds: Rhonchi present. Comments: Coarse lung sounds  Abdominal:      General: Bowel sounds are decreased. There is no distension. Comments: Driveline exit site with dressing C/D/I   Musculoskeletal:      Right lower le+ Pitting Edema present. Left lower le+ Pitting Edema present. Comments: Left groin Impella in place, dressing C/D/I   Skin:     Capillary Refill: Capillary refill takes more than 3 seconds. Coloration: Skin is jaundiced.            REVIEW OF SYSTEMS:  Review of Systems   Unable to perform ROS: Intubated           PAST MEDICAL HISTORY:  Past Medical History:   Diagnosis Date    CAD (coronary artery disease) 11/10/2016    NSTEMI & 2 stents    Deafness 10/28/2012    DM (diabetes mellitus) ([facility] [address]     Elevated cholesterol     Hypertension     NSTEMI (non-ST elevated myocardial infarction) ([facility] [address] 11/10/2016       PAST SURGICAL HISTORY:  Past Surgical History:   Procedure Laterality Date    COLONOSCOPY N/A 2018    COLONOSCOPY performed by Fatou Mora MD at [facility] ENDOSCOPY    COLONOSCOPY N/A 1/18/2023    COLONOSCOPY performed by Leonie Mancuso MD at [facility] ENDOSCOPY    [address]  11/11/2016    2 stents       FAMILY HISTORY:  Family History   Problem Relation Age of Onset    Heart Disease Father     Heart Attack Father     Hypertension Mother     Elevated Lipids Brother     Elevated Lipids Brother     No Known Problems Sister     Elevated Lipids Brother     No Known Problems Son     No Known Problems Daughter     Anesth Problems Neg Hx        SOCIAL HISTORY:  Social History     Socioeconomic History    Marital status:    Tobacco Use    Smoking status: Never     Passive exposure: Never    Smokeless tobacco: Never   Vaping Use    Vaping Use: Never used   Substance and Sexual Activity    Alcohol use: Yes     Alcohol/week: 2.0 standard drinks     Types: 1 Cans of beer, 1 Shots of liquor per week     Comment: rarely    Drug use: No    Sexual activity: Yes     Social Determinants of Health     Financial Resource Strain: Medium Risk    Difficulty of Paying Living Expenses: Somewhat hard   Food Insecurity: Food Insecurity Present    Worried About Running Out of Food in the Last Year: Never true    Ran Out of Food in the Last Year: Often true       LABORATORY RESULTS:     Labs Latest Ref Rng & Units 2/24/2023 2/23/2023 2/23/2023 2/22/2023 2/22/2023 2/21/2023 2/21/2023   WBC 4.1 - 11.1 K/uL 9.6 - 9.4 - 8.7 - -   RBC 4.10 - 5.70 M/uL 2.67(L) - 3.00(L) - 2.58(L) - -   Hemoglobin 12.1 - 17.0 g/dL 7. 6(L) - 8. 3(L) 8.0(L) 7. 1(L) - 8. 2(L)   Hematocrit 36.6 - 50.3 % 23. 4(L) - 25. 8(L) 25. 1(L) 22. 1(L) - 26. 1(L)   MCV 80.0 - 99.0 FL 87.6 - 86.0 - 85.7 - -   Platelets 566 - 980 K/uL 93(L) - 80(L) - 60(L) - -   Lymphocytes 12 - 49 % 8(L) - 8(L) - 8(L) - -   Monocytes 5 - 13 % 17(H) - 12 - 9 - -   Eosinophils 0 - 7 % 5 - 6 - 9(H) - -   Basophils 0 - 1 % 1 - 1 - 1 - -   Albumin 3.5 - 5.0 g/dL 3.6 3. 2(L) 3.5 - 3.4(L) - -   Calcium 8.5 - 10.1 MG/DL 8.6 9.1 9.0 8.4(L) 8.7 8.4(L) -   Glucose 65 - 100 mg/dL 108(H) 174(H) 133(H) 155(H) 120(H) 160(H) -   BUN 6 - 20 MG/DL 22(H) 21(H) 21(H) 22(H) 22(H) 24(H) -   Creatinine 0.70 - 1.30 MG/DL 1.71(H) 1.94(H) 1.92(H) 1.94(H) 1.81(H) 1.85(H) -   Sodium 136 - 145 mmol/L 137 137 138 139 139 138 -   Potassium 3.5 - 5.1 mmol/L 4.2 4.0 3.9 4.2 4.4 4.1 -   TSH 0.36 - 3.74 uIU/mL - - - - - - -   PSA 0.01 - 4.0 ng/mL - - - - - - -   LDH 85 - 241 U/L 623(H) - 637(H) - 573(H) - -   Some recent data might be hidden     Lab Results   Component Value Date/Time    TSH 3.52 01/28/2023 05:26 AM    TSH 2.12 12/27/2022 02:36 PM    TSH 4.80 (H) 12/06/2022 03:53 AM    TSH 5.39 (H) 10/12/2022 09:10 AM    TSH 3.53 02/03/2022 11:47 AM    TSH 5.790 (H) 11/21/2019 04:45 PM    TSH 3.08 06/22/2018 01:53 PM    TSH 4.250 05/26/2015 09:43 AM       ALLERGY:  Allergies   Allergen Reactions    Ambien [Zolpidem] Other (comments)     Causes extreme confusion        CURRENT MEDICATIONS:    Current Facility-Administered Medications:     fluconazole (DIFLUCAN) 400mg/200 mL IVPB (premix), 400 mg, IntraVENous, Q24H, Lizette Linton, NP, Last Rate: 100 mL/hr at 02/23/23 1204, 400 mg at 02/23/23 1204    vancomycin (VANCOCIN) 750 mg in 0.9% sodium chloride 250 mL ([id number]), 750 mg, IntraVENous, Q24H, Walker Aponte MD, Last Rate: 250 mL/hr at 02/23/23 1641, 750 mg at 02/23/23 1641    alteplase (CATHFLO) 2 mg in dextrose 5% 50 mL impella purge solution, 2 mg, Other, TITRATE, Beth Hawkins, PA, 2 mg at 02/24/23 0425    milrinone (PRIMACOR) 20 mg/100 mL (200 mcg/mL) infusion, , , ,     0.9% sodium chloride infusion 250 mL, 250 mL, IntraVENous, PRN, Shaila, Lizette B, NP    piperacillin-tazobactam (ZOSYN) 3.375 g in 0.9% sodium chloride (MBP/ADV) 100 mL MBP, 3.375 g, IntraVENous, Q8H, Walker Aponte MD, Last Rate: 25 mL/hr at 02/24/23 0523, 3.375 g at 02/24/23 0523    Vancomycin - pharmacy to dose, , Other, Rx Dosing/Monitoring, Tammy MOLINA MD    metroNIDAZOLE (FLAGYL) IVPB premix 500 mg, 500 mg, IntraVENous, Q12H, Walker Aponte MD, Last Rate: 100 mL/hr at 02/23/23 2104, 500 mg at 02/23/23 2104    white petrolatum-mineral oiL (STYE LUBRICANT) ointment, , Both Eyes, Q12H, Magy STALEY, NP, Given at 02/22/23 2140    bicarbonate dialysis (PRISMASOL) BG K 4/Ca 2.5 5000 ml solution, , Extracorporeal, DIALYSIS CONTINUOUS, Jadiel Galvan MD, Last Rate: 1,700 mL/hr at 02/24/23 0536, New Bag at 02/24/23 0536    heparin (porcine) 1,000 unit/mL injection 1,700 Units, 1,700 Units, InterCATHeter, DIALYSIS PRN, 1,700 Units at 02/20/23 0040 **AND** heparin (porcine) 1,000 unit/mL injection 1,500 Units, 1,500 Units, InterCATHeter, DIALYSIS PRN, April Shelling, DO, 1,500 Units at 02/20/23 0037    propofol (DIPRIVAN) 10 mg/mL infusion, 0-50 mcg/kg/min, IntraVENous, TITRATE, Joe Carter MD, Stopped at 02/22/23 2714    sodium bicarbonate (8.4%) 25 mEq in dextrose 5% 1,000 mL infusion, , Other, TITRATE, Adriana Broussard MD, Last Rate: 5.9 mL/hr at 02/22/23 2009, Rate Verify at 02/22/23 2009    fentaNYL (PF) 1,500 mcg/30 mL (50 mcg/mL) infusion, 0-200 mcg/hr, IntraVENous, TITRATE, Tammy MOLINA MD, Last Rate: 2.5 mL/hr at 02/24/23 0549, 125 mcg/hr at 02/24/23 0549    balsam peru-castor oiL (VENELEX) ointment, , Topical, BID, Eleonora Johnson MD, Given at 02/23/23 1825    niCARdipine (CARDENE) 50 mg in 0.9% sodium chloride 100 mL infusion, 0-15 mg/hr, IntraVENous, TITRATE, Eleonora Johnson MD, Paused at 02/18/23 2315    acetaminophen (TYLENOL) solution 650 mg, 650 mg, Oral, Q4H PRN, Mounika Rodríguez MD, 650 mg at 02/20/23 0401    acetaminophen (TYLENOL) suppository 650 mg, 650 mg, Rectal, Q4H PRN, Mounika Rodríguez MD, 650 mg at 02/17/23 2013    HYDROmorphone (DILAUDID) injection 0.5 mg, 0.5 mg, IntraVENous, Q3H PRN, Tammy MOLINA MD, 0.5 mg at 02/22/23 2225    HYDROmorphone (DILAUDID) injection 1 mg, 1 mg, IntraVENous, Q4H PRN, Vidya MOLINA MD, 1 mg at 02/17/23 1708    heparin 25,000 units in D5W 250 ml infusion, 12-25 Units/kg/hr, IntraVENous, TITRATE, Mike Kirk MD, Last Rate: 5.4 mL/hr at 02/24/23 0305, 8 Units/kg/hr at 02/24/23 0305    albumin human 25% (BUMINATE) solution 12.5 g, 12.5 g, IntraVENous, Q6H, Mike Kirk MD, 12.5 g at 02/24/23 0391    Warfarin - MD/NP dosing, , Other, Rx Dosing/Monitoring, Mike Kirk MD    [facility]) injection 0.5 mg, 0.5 mg, IntraVENous, Q4H PRN, Mike Kirk MD, 0.5 mg at 02/17/23 1431    insulin regular (MYXREDLIN, NOVOLIN, HUMULIN) 100 units/100 ml NS infusion (premix), 0-50 Units/hr, IntraVENous, TITRATE, Shaila, Lizette B, NP, Last Rate: 1.8 mL/hr at 02/24/23 0517, 1.8 Units/hr at 02/24/23 0517    glucose chewable tablet 16 g, 4 Tablet, Oral, PRN, Shaila, Lizette B, NP    glucagon (GLUCAGEN) injection 1 mg, 1 mg, IntraMUSCular, PRN, Shaila, Lizette B, NP    insulin lispro (HUMALOG) injection, , SubCUTAneous, TIDAC, Sheldon, Cathy, CNS    sodium chloride (NS) flush 5-40 mL, 5-40 mL, IntraVENous, Q8H, Shaila, Lizette B, NP, 10 mL at 02/24/23 0537    sodium chloride (NS) flush 5-40 mL, 5-40 mL, IntraVENous, PRN, Shaila, Lizette B, NP, 40 mL at 02/17/23 1818    naloxone (NARCAN) injection 0.4 mg, 0.4 mg, IntraVENous, PRN, Shaila, Lizette B, NP    ELECTROLYTE REPLACEMENT NOTE: Nurse to review Serum Potassium and Magnesuim levels and Initiate Electrolyte Replacement Protocol as needed, 1 Each, Other, PRN, Shaila, Lizette B, NP    dextrose 10 % infusion 0-250 mL, 0-250 mL, IntraVENous, PRN, Shaila, Lizette B, NP, Last Rate: 150 mL/hr at 02/24/23 0220, 75 mL at 02/24/23 0220    acetaminophen (TYLENOL) tablet 650 mg, 650 mg, Oral, Q6H PRN, Shaila, Lizette B, NP, 650 mg at 02/17/23 0200    oxyCODONE IR (ROXICODONE) tablet 10 mg, 10 mg, Oral, Q4H PRN, Shaila, Lizette B, NP    ondansetron (ZOFRAN) injection 4 mg, 4 mg, IntraVENous, Q4H PRN, Shaila, Lizette B, NP    albuterol-ipratropium (DUO-NEB) 2.5 MG-0.5 MG/3 ML, 3 mL, Nebulization, Q6H PRN, Shaila, Lizette B, NP    [Held by provider] aspirin chewable tablet 81 mg, 81 mg, Oral, DAILY, Shaila, Lizette B, NP, 81 mg at 02/17/23 0942    senna-docusate (PERICOLACE) 8.6-50 mg per tablet 1 Tablet, 1 Tablet, Oral, DAILY, Shaila, Lizette B, NP, 1 Tablet at 02/23/23 0911    polyethylene glycol (MIRALAX) packet 17 g, 17 g, Oral, DAILY, Shaila, Lizette B, NP, 17 g at 02/23/23 0911    bisacodyL (DULCOLAX) suppository 10 mg, 10 mg, Rectal, DAILY PRN, Shaila, Lizette B, NP, 10 mg at 02/22/23 0839    0.45% sodium chloride infusion, 10 mL/hr, IntraVENous, CONTINUOUS, Sloan Whaley MD, Last Rate: 10 mL/hr at 02/23/23 0755, 10 mL/hr at 02/23/23 0755    DOBUTamine (DOBUTREX) 500 mg/250 mL (2,000 mcg/mL) infusion, 0-10 mcg/kg/min, IntraVENous, TITRATE, Megha Johnson MD, Stopped at 02/23/23 1044    dexmedeTOMidine in 0.9 % NaCl (PRECEDEX) 400 mcg/100 mL (4 mcg/mL) infusion soln, 0.1-1.5 mcg/kg/hr, IntraVENous, TITRATE, Megha Johnson MD, Last Rate: 21.2 mL/hr at 02/24/23 0409, 1.5 mcg/kg/hr at 02/24/23 0409    0.9% sodium chloride infusion, 9 mL/hr, IntraVENous, CONTINUOUS, Megha Johnson MD, Last Rate: 11 mL/hr at 02/23/23 1952, 11 mL/hr at 02/23/23 1952    EPINEPHrine (ADRENALIN) 10 mg in 0.9% sodium chloride 250 mL infusion, 0-10 mcg/min, IntraVENous, TITRATE, Shaila, Lizette B, NP, Last Rate: 7.5 mL/hr at 02/24/23 0626, 5 mcg/min at 02/24/23 1443    NOREPINephrine (LEVOPHED) 32,000 mcg in dextrose 5% 250 mL (128 mcg/mL) infusion, 0.5-16 mcg/min, IntraVENous, TITRATE, Shaila, Lizette B, NP, Last Rate: 0.9 mL/hr at 02/24/23 0626, 2 mcg/min at 02/24/23 6654    PATIENT CARE TEAM:  Patient Care Team:  Mariza Connolly MD as PCP - General (Family Medicine)  Mariza Connolly MD as PCP - [address] [place] [address]  Ariadna Rene MD (Cardiovascular Disease Physician)  Luis Enrique Gore, Gunjan Michael MD (Gastroenterology)  Milka Alicia MD (Cardiothoracic Surgery)  Najma Rahman MD (Cardiovascular Disease Physician)  Anum Presley MD (Nephrology)  Otilia Mina MD (Pulmonary Disease)  Rishabh Eddy MD ([address] [place] [address] Vascular Surgery)     Thank you for allowing me to participate in this patient's care. Total Critical Care time spent:   70 minutes. There was no overlap with other services        Critical care was necessary to treat or prevent imminent or life threatening deterioration of the following conditions: cardiac failure, respiratory failure and CNS failure or compromise     Services Provided:  1. Telemetry review and 12 lead ECG interpretation  2. Hemodynamic interpretation, assessment, and management  3. Review and interpretation of CXR  4. Review and interpretation of lab values  5. Review and interpretation of microbiologic data and culture results  6. Review of medications and administration  7. Review and interpretation of nutrition requirements and management  8. Discussion of management withother consultants and services  9.  Clinical update to family members        Ashley Gonzalez NP   [address] [place] [address], [address]  Phone: [phone]

## 2023-02-24 NOTE — DIALYSIS
CRRT / [phone]    Orders   Mode: CVVHD rounding   Blood Flow Rate: 250 ml/hr   Prismasol Dose: Dialysate Flow Rate: 1750 ml/hr   Prismasol Concentrate: 4K / 2.5Ca   Blood Warmer Temp: 37*c   Net Fluid Removal: Currently 50 ml/hr, Primary RN to increase as tolerated   Heparin: Systemic gtt infusing     Metrics   BP: 82/59 (68)   HR: 74   Access Pressure: -61   Filter Pressure: 187   Return Pressure: 88   TMP: 30   Pressure Drop: 70     Access   Type & Location: [facility]J non-tunneled CVC C/D/I with transparent dressing and biopatch in place dated 02/21/23. Labs   HBsAg (Antigen) / date: Negative  02/18/23                                              HBsAb (Antibody) / date: Susceptible  02/18/23   Source: [employer]     Safety:   Time Out Done:   (Time) 0240   Consent obtained/signed: Verified   Education: Access/Site Care   Primary Nurse Rpt Pre: LORI Monique   Primary Nurse Rpt Post: LORI Monique     Comments / Plan:   Patient, orders, line and consent verified. Labs, notes and code status reviewed. [id number] filter running well with no indications for change at this time. Lines visible/secure with blood warmer attached to the return line and set to 37C*. Education and Pre/Post with primary RN. Pt currently running with lines reversed and a systemic heparin gtt infusing.

## 2023-02-24 NOTE — PROGRESS NOTES
NICOM Hemodynamic Monitoring     Visit Vitals  BP (!) 82/59   Pulse 76   Temp 97.7 °F (36.5 °C)   Resp 19   Ht 5' 6\" (1.676 m)   Wt 68.1 kg (150 lb 2.1 oz)   SpO2 97%   BMI 24.23 kg/m²         Baseline assessment:        CO 5.8        CI 3.4        SVI 36        SVV 13        TPR 1570

## 2023-02-24 NOTE — PROGRESS NOTES
RENAL  PROGRESS NOTE        Subjective:     Remains Intubated. On CRRT. Afebrile. Impella remains at P-7  Discussed case with CVICU RN    Objective:   VITALS SIGNS:    Visit Vitals  BP (!) 82/59   Pulse 80   Temp 98.1 °F (36.7 °C)   Resp 9   Ht 5' 6\" (1.676 m)   Wt 68.1 kg (150 lb 2.1 oz)   SpO2 96%   BMI 24.23 kg/m²       O2 Device: Endotracheal tube, Ventilator   O2 Flow Rate (L/min): 20 l/min   Temp (24hrs), Av.9 °F (36.6 °C), Min:96.1 °F (35.6 °C), Max:100.9 °F (38.3 °C)         PHYSICAL EXAM:  Intubated  +ETT  + LE edema      DATA REVIEW:     INTAKE / OUTPUT:   Last shift:       0701 - 1900  In: 211.6 [I.V.:211.6]  Out: 193   Last 3 shifts: 1901 -  0700  In: 0960 [I.V.:3850]  Out: 9834 [Urine:7; Drains:650]    Intake/Output Summary (Last 24 hours) at 2023 1122  Last data filed at 2023 1000  Gross per 24 hour   Intake 3438.08 ml   Output 3555 ml   Net -116.92 ml           LABS:   Recent Labs     23  0259 23  0310 23  1628 23  0300   WBC 9.6 9.4  --  8.7   HGB 7.6* 8.3* 8.0* 7.1*   HCT 23.4* 25.8* 25.1* 22.1*   PLT 93* 80*  --  60*       Recent Labs     23  0259 23  1637 23  0310 23  1628 23  0300    137 138 139 139   K 4.2 4.0 3.9 4.2 4.4    107 106 108 107   CO2 24 25 26 25 26   * 174* 133* 155* 120*   BUN 22* 21* 21* 22* 22*   CREA 1.71* 1.94* 1.92* 1.94* 1.81*   CA 8.6 9.1 9.0 8.4* 8.7   MG 3.0*  --  2.8* 2.5* 2.7*   PHOS 2.2* 2.4*  --  2.1* 2.5*   ALB 3.6 3.2* 3.5  --  3.4*   TBILI 15.9*  --  16.9*  --  11.7*   ALT 14  --  12  --  15   INR 1.7*  --  1.9*  --  3.4*           Assessment:  TANNER on CKD-3a: Suspect cardiorenal effects with needed diuresis. Now has LVAD and  POST OP ATN. Anuric->Requiring CRRT     Ischemic CM: Recurrent exacerbations. EF 20%. S/p LVAD on 2/15. Required Impella RP for RV support-> attempts to wean not tolerated by patient.      Sepsis: Urosepsis-> growing candida    BPH Well differentiated NET Grade 1: noted on EGD 1/18/23     Anemia 2 to CKD:  s/p PRBCS      Left UE basilic and radial vein thrombus    Thrombocytopenia       Plan/Recommendations:  Seen on CVVHD. Factor rate-> at 50ml/hr. Ct 4K Prismasol bags. Levophed/Epi  Diflucan  Consider RP exchange.    Intermittent bladder scans  Q12hr CRRT labs  Avoid nephrotoxins    Discussed with CVICU RN and Dr. Alicia Galvan MD  [id number]

## 2023-02-24 NOTE — PROGRESS NOTES
CRITICAL CARE NOTE      Name: Lindy Vincent   : 1950   MRN: [id number]   Date: 2023      Reason for ICU Admission: Acute on chronic HFrEF, LVAD workup     ICU PROBLEM LIST   Acute on chronic HFrEF (20%) NYHA IIIb/IV (D) on home inotropic support, life vest  TANNER on CKD3  CHB post op  Aflutter w/ RVR  Acute on chronic hypoxemic respiratory failure  CAP  CAD  Gastric neuroendocrine tumor  Hx of LUE DVT  DM  PAD  TRISTAN  BPH w/ urinary retention requiring chronic donnelly    HISTORY OF PRESENT ILLNESS:   Pt is a 71 y.o M w/ PMH as noted above admitted to [facility] on  due to ongoing symptoms secondary to his HFrEF. Treated for possible CAP, and diuresed for acute on chronic HFrEF. Nephrology following for TANNER on CKD 3. AHF team recommended transfer to CVICU for [facility] placement and ongoing LVAD workup, with placement 2/15. Pt extubated , however with development of worsening renal dysfunction overnight and unable to tolerate CRRT so was reintubated and taken for Impella RP placement for right-sided support in a.m. of  and CRRT resumed.     24 HOUR EVENTS:   No acute events     NEUROLOGICAL:    -No focal deficits  -No weakness  -Daily awakening trials    PULMONOLOGY: Acute hypoxic respiratory failure   -Continue to monitor oxygen saturations  -Continue mechanical ventilation   -Wean as tolerated   -Daily spontaneous breathing trials   -Chest x-ray reviewed   -ABG reviewed     CARDIOVASCULAR: STEMI, multivessel disease, acute on chronic congestive heart failure   Status post LVAD   -Flows 3-4    -Speed 4600  Status post Impella RP placement, P6   -Purge flow 3.6  -Echo reviewed  -probnp 10,977  -Continue to monitor hemodynamic status  -Luz@[employer]  -Norepinephrine@4  -proBNP 9304 slightly elevated compared to yesterday  -Wean as tolerated  -EF 20 to 25%  -Continue heparin  -CVP goal 10-12  -Mean arterial pressure goal greater than 70  -Continue to monitor chest tube output    GASTROINTESTINAL: Continue enteral feeds    RENAL/ELECTROLYTE/FLUIDS: TANNER, chronic kidney disease   -Continue to monitor urine output   -Continue CRRT    -Removal right 50cc/hour  -Place electrolytes as indicated  -Nephrology following  strict I/Os,   Continue CRRT    -pull off 50 cc/hr of fluid  -Vasquez in place    ENDOCRINE:   -Continue to monitor blood glucose levels   -Maintain blood glucose levels between 140-180  -Continue IV insulin    HEMATOLOGY/ONCOLOGY: Gastric neuroendocrine tumor   -Hemoglobin 7.6  -Platelets 93  -Continue heparin    ID/MICRO: Community-acquired pneumonia   -WBC 9.6  -Procalcitonin 4.88  -Lactic acid 1.9  -MRSA culture 20 February 2023 negative  -Continue antibiotics (Flagyl, vancomycin and Zosyn)  -Blood cultures 28 January 2023 no growth in 5 days  -Blood cultures 20 February 2023 no growth in 2 days  -Respiratory cultures 20 February 2023 no growth in 2 days  -Urine culture 20 February 2023    -YEAST Abnormal  P   -Continue antibiotics vancomycin, Flagyl and Zosyn  -Continue antifungal (Diflucan)    ICU DAILY CHECKLIST     Code Status:Full  DVT Prophylaxis:heparin  T/L/D: ETT, Impella PIV,  Vasquez,  V  wire,  valentin  x5, LVAD drive line  SUP: PPI  Diet: NPO  Activity Level:ad jose f  ABCDEF Bundle/Checklist Completed:Yes  Disposition: Stay in ICU  Multidisciplinary Rounds Completed:  yes  Patient/Family Updated: Yes    Tatiana   2/3 Transferred to CVICU for [facility] placement  2/7 started on dobutamine as adjunct to milrinone  2/15 LVAD implant  2/16 extubated  2/18 Impella RP placement    SUBJECTIVE:     As noted above    Review of Systems:     Unable to perform due to patient intubated    OBJECTIVE:     Labs and Data: Reviewed 02/24/23  Medications: Reviewed 02/24/23  Imaging: Reviewed 02/24/23    General - sedated, intubated chronically ill appearing  HEENT- pupils equal, nystagmus, anicteric  Neuro - sedated, no focal deficit  CV - Paced,  VAD hum, post sternotomy, valentin  x5  Resp - rales b/l, NC  Abd - soft, distended, nontender, no guarding  MSK- intact, no sacral ulcer  Right femoral Impella, LVAD driveline in left chest    Visit Vitals  BP (!) 82/59   Pulse 80   Temp 98.1 °F (36.7 °C)   Resp 9   Ht 5' 6\" (1.676 m)   Wt 68.1 kg (150 lb 2.1 oz)   SpO2 96%   BMI 24.23 kg/m²    O2 Flow Rate (L/min): 20 l/min O2 Device: Endotracheal tube, Ventilator Temp (24hrs), Av.4 °F (36.3 °C), Min:96.1 °F (35.6 °C), Max:99.3 °F (37.4 °C)    CVP (mmHg): 10 mmHg (23 1100)      Intake/Output:     Intake/Output Summary (Last 24 hours) at 2023 1543  Last data filed at 2023 1500  Gross per 24 hour   Intake 3467.31 ml   Output 3847 ml   Net -379.69 ml       Imaging    23    ECHO ADULT FOLLOW-UP OR LIMITED 2023    Interpretation Summary    Left Ventricle: EF by visual approximation is 20%. Left ventricle is mildly dilated. Mitral Valve: Moderately thickened leaflet, at the anterior and posterior leaflets. Moderately calcified leaflet. Moderate regurgitation. Left Atrium: Left atrium is moderately dilated. Technical qualifiers: Echo study was technically difficult with poor endocardial visualization. Contrast used: Definity. Limited study, not all structures viewed    Signed by: Jocelynn Soliman MD on 2023  4:39 PM       Pertinent imaging reviewed and interpreted as noted above    CRITICAL CARE DOCUMENTATION  I had a face to face encounter with the patient, reviewed and interpreted patient data including clinical events, labs, images, vital signs, I/O's, and examined patient. I have discussed the case and the plan and management of the patient's care with the consulting services, the bedside nurses and the respiratory therapist.      NOTE OF PERSONAL INVOLVEMENT IN CARE   This patient has a high probability of imminent, clinically significant deterioration, which requires the highest level of preparedness to intervene urgently.  I participated in the decision-making and personally managed or directed the management of the following life and organ supporting interventions that required my frequent assessment to treat or prevent imminent deterioration. I personally spent 40 minutes of critical care time. This is time spent at this critically ill patient's bedside actively involved in patient care as well as the coordination of care. This does not include any procedural time which has been billed separately.     Kristal Vitale MD  Staff [address] [facility]

## 2023-02-25 NOTE — PROGRESS NOTES
Critical Care Note      Cardiac surgery was called for the evaluation and management of: Stage D heart failure, cardiogenic shock      The critical nature of the patient's condition includes the following: the patient is at imminent risk of clinical deterioration and death      Critical care diagnoses that are being treated:     Cardiogenic shock   RV failure     Cardiogenic Shock   Remains intubated and sedated  Continues on  and intermittent Levophed  Lactic acid 1.7  Creatinine 1.6  LFTs 50's  Discussed with ICU and heart failure teams   CXR a little more wet today  Would like to pull more volume  Will touch base with renal      RV failure  Continues on RVAD support  Continues on Epi  Pressure and LVAD flows didn't drop as much with RP wean   Will leave Impella at P-6      Renal failure   Continues on CVVH  Pulling factor (-) 50              Intake/Output Summary (Last 24 hours) at 2023 1144  Last data filed at 2023 1059  Gross per 24 hour   Intake 3405.89 ml   Output 4345 ml   Net -939.11 ml      Visit Vitals  BP (!) 75/56   Pulse 91   Temp (!) 96.1 °F (35.6 °C)   Resp 19   Ht 5' 6\" (1.676 m)   Wt 149 lb 4 oz (67.7 kg)   SpO2 96%   BMI 24.09 kg/m²      CXR Results  (Last 48 hours)                 23 0424  XR CHEST PORT Final result    Impression:      1. The ET tube is in satisfactory position. Remaining support devices are   stable. 2. Ongoing edema. Narrative:  EXAM: XR CHEST PORT       DATE: 2023 4:24 AM       INDICATION: Impella surveillance       COMPARISON: Chest radiograph 2023       FINDINGS: AP portable chest radiograph. There is an LVAD in place. There is an   Impella device in stable position. The ET tube is in satisfactory position. NG   tube courses to the abdomen. RIGHT IJ catheter and LEFT IJ catheter are stable   in position. The heart size is normal. There is ongoing moderate edema. No new   consolidation is seen.  There is no pneumothorax. 02/24/23 0515  XR CHEST PORT Final result    Impression:  Support apparatus in stable and expected positions with slight   improvement in diffuse pulmonary edema. Narrative:  EXAM:  XR CHEST PORT       INDICATION: Impella surveillance. COMPARISON: Chest x-ray 2/23/2023. TECHNIQUE: Semiupright portable chest AP view       FINDINGS: Endotracheal tube, enteric tube, Impella device, right and left   central venous catheters, and AICD device projects in stable and expected   positions. Lungs appear slightly cleared with persistent diffuse vascular   prominence and interstitial prominence. No pleural effusion or pneumothorax. The   cardiac silhouette is within normal limits. The visualized bones and upper   abdomen are age-appropriate. LVAD   Pump Speed (RPM): 4600  Pump Flow (LPM): 3.1  PI (Pulsitility Index): 6.3  Power: 2.9   Test: No  Back Up  at Bedside & Labeled: Yes  Power Module Test: No  Driveline Site Care: Yes  Driveline Dressing: Changed per order  Testing  Alarms Reviewed: Yes  Back up SC speed: 4600  Back up Low Speed Limit: 4200  Emergency Equipment with Patient?: Yes  Emergency procedures reviewed?: Yes  Drive line site inspected?: No  Drive line intergrity inspected?: Yes  Drive line dressing changed?: No         I personally spent the above critical care time. This is time spent at this critically ill patient's bedside / unit / floor actively involved in patient care as well as the coordination of care. This does not include any procedural time which has been billed separately. This does not include any NP/PA patient care time. I had a face to face encounter with the patient, reviewed and interpreted patient data including clinical events, labs, images, vital signs, I/O's, and examined patient.   I have discussed the case and the plan and management of the patient's care with the consulting services and bedside nurses. This patient has a high probability of imminent, clinically significant deterioration, which requires the highest level of preparedness to intervene urgently. I participated in the decision-making and personally managed or directed the management of life and organ supporting interventions to treat or prevent imminent deterioration. This patient has a critical illness or injury that is acutely impairing one or more vital organ systems such that there is a high probability of imminent or life threatening deterioration in the patient's condition. This patient requires high complexity medical decision making to assess, manipulate, and support vital organ system failure. After stabilization, this patient still requires management to prevent further life / organ threatening deterioration.

## 2023-02-25 NOTE — PROGRESS NOTES
Critical Care Note      Cardiac surgery was called for the evaluation and management of: Stage D heart failure, cardiogenic shock      The critical nature of the patient's condition includes the following: the patient is at imminent risk of clinical deterioration and death      Critical care diagnoses that are being treated:     Cardiogenic shock   RV failure     Cardiogenic Shock   Remains intubated and sedated  Continues on  and intermittent Levophed  Lactic acid 1.6  Creatinine 1.7  LFTs 60's  Discussed with ICU and heart failure teams   CXR more clear today      RV failure  Continues on RVAD support  Continues on Epi  Pressure and LVAD flows didn't drop as much with RP wean   Will leave Impella at P-6      Renal failure   Continues on CVVH  Pulling factor (-) 50             Intake/Output Summary (Last 24 hours) at 2023 1141  Last data filed at 2023 1059  Gross per 24 hour   Intake 3405.89 ml   Output 4345 ml   Net -939.11 ml      Visit Vitals  BP (!) 75/56   Pulse 91   Temp (!) 96.1 °F (35.6 °C)   Resp 19   Ht 5' 6\" (1.676 m)   Wt 149 lb 4 oz (67.7 kg)   SpO2 96%   BMI 24.09 kg/m²      CXR Results  (Last 48 hours)                 23 0424  XR CHEST PORT Final result    Impression:      1. The ET tube is in satisfactory position. Remaining support devices are   stable. 2. Ongoing edema. Narrative:  EXAM: XR CHEST PORT       DATE: 2023 4:24 AM       INDICATION: Impella surveillance       COMPARISON: Chest radiograph 2023       FINDINGS: AP portable chest radiograph. There is an LVAD in place. There is an   Impella device in stable position. The ET tube is in satisfactory position. NG   tube courses to the abdomen. RIGHT IJ catheter and LEFT IJ catheter are stable   in position. The heart size is normal. There is ongoing moderate edema. No new   consolidation is seen. There is no pneumothorax.            23 0515  XR CHEST PORT Final result    Impression: Support apparatus in stable and expected positions with slight   improvement in diffuse pulmonary edema. Narrative:  EXAM:  XR CHEST PORT       INDICATION: Impella surveillance. COMPARISON: Chest x-ray 2/23/2023. TECHNIQUE: Semiupright portable chest AP view       FINDINGS: Endotracheal tube, enteric tube, Impella device, right and left   central venous catheters, and AICD device projects in stable and expected   positions. Lungs appear slightly cleared with persistent diffuse vascular   prominence and interstitial prominence. No pleural effusion or pneumothorax. The   cardiac silhouette is within normal limits. The visualized bones and upper   abdomen are age-appropriate. LVAD   Pump Speed (RPM): 4600  Pump Flow (LPM): 3.1  PI (Pulsitility Index): 6.3  Power: 2.9   Test: No  Back Up  at Bedside & Labeled: Yes  Power Module Test: No  Driveline Site Care: Yes  Driveline Dressing: Changed per order  Testing  Alarms Reviewed: Yes  Back up SC speed: 4600  Back up Low Speed Limit: 4200  Emergency Equipment with Patient?: Yes  Emergency procedures reviewed?: Yes  Drive line site inspected?: No  Drive line intergrity inspected?: Yes  Drive line dressing changed?: No       I personally spent the above critical care time. This is time spent at this critically ill patient's bedside / unit / floor actively involved in patient care as well as the coordination of care. This does not include any procedural time which has been billed separately. This does not include any NP/PA patient care time. I had a face to face encounter with the patient, reviewed and interpreted patient data including clinical events, labs, images, vital signs, I/O's, and examined patient. I have discussed the case and the plan and management of the patient's care with the consulting services and bedside nurses.        This patient has a high probability of imminent, clinically significant deterioration, which requires the highest level of preparedness to intervene urgently. I participated in the decision-making and personally managed or directed the management of life and organ supporting interventions to treat or prevent imminent deterioration. This patient has a critical illness or injury that is acutely impairing one or more vital organ systems such that there is a high probability of imminent or life threatening deterioration in the patient's condition. This patient requires high complexity medical decision making to assess, manipulate, and support vital organ system failure. After stabilization, this patient still requires management to prevent further life / organ threatening deterioration.

## 2023-02-25 NOTE — PROGRESS NOTES
RENAL  PROGRESS NOTE        Subjective:     Remains Intubated. On CRRT. Pt lines running reversed on start and pt also has a systemic heparin gtt infusing. Objective:   VITALS SIGNS:    Visit Vitals  BP (!) 75/56   Pulse 96   Temp 98.8 °F (37.1 °C)   Resp 15   Ht 5' 6\" (1.676 m)   Wt 67.7 kg (149 lb 4 oz)   SpO2 96%   BMI 24.09 kg/m²       O2 Device: Endotracheal tube, Ventilator   O2 Flow Rate (L/min): 20 l/min   Temp (24hrs), Av.8 °F (36.6 °C), Min:97 °F (36.1 °C), Max:99.5 °F (37.5 °C)         PHYSICAL EXAM:  Intubated  +ETT  + LE edema      DATA REVIEW:     INTAKE / OUTPUT:   Last shift:      1901 -  0700  In: 1485.2 [I.V.:1045.2]  Out:  [Drains:90]  Last 3 shifts:  07 -  1900  In: 5518.9 [I.V.:4198.9]  Out: 6000 [Drains:370]    Intake/Output Summary (Last 24 hours) at 2023 0622  Last data filed at 2023 0600  Gross per 24 hour   Intake 3336.78 ml   Output 4180 ml   Net -843.22 ml           LABS:   Recent Labs     23  0409 23  0259 23  0310   WBC 6.7 9.6 9.4   HGB 7.3* 7.6* 8.3*   HCT 23.9* 23.4* 25.8*   * 93* 80*       Recent Labs     23  0409 23  1636 23  0259 23  1637 23  0310     --  137 137 138   K 4.5  --  4.2 4.0 3.9     --  106 107 106   CO2 26  --     *  --  108* 174* 133*   BUN 23*  --  22* 21* 21*   CREA 1.64*  --  1.71* 1.94* 1.92*   CA 8.7  --  8.6 9.1 9.0   MG 3.0*  --  3.0*  --  2.8*   PHOS 2.3* 2.0* 2.2* 2.4*  --    ALB 3.3*  --  3.6 3.2* 3.5   TBILI 14.9*  --  15.9*  --  16.9*   ALT 14  --  14  --  12   INR 2.1*  --  1.7*  --  1.9*           Assessment:  TANNER on CKD-3a: Suspect cardiorenal effects with needed diuresis. Now has LVAD and  POST OP ATN. Anuric->Requiring CRRT     Ischemic CM: Recurrent exacerbations. EF 20%. S/p LVAD on 2/15. Required Impella RP for RV support-> attempts to wean not tolerated by patient.      Sepsis: Urosepsis-> growing candida    BPH     Well differentiated NET Grade 1: noted on EGD 1/18/23     Anemia 2 to CKD:  s/p PRBCS      Left UE basilic and radial vein thrombus    Thrombocytopenia       Plan/Recommendations:  Seen on CVVHD. Factor rate-> at 50ml/hr. Ct 4K Prismasol bags (K 4.5 - trend).   Levophed/Epi  Intermittent bladder scans  Daily CRRT labs - check a K this afternoon (I discussed with his nurse)  Avoid nephrotoxins    Linda Pedraza MD  [id number]

## 2023-02-25 NOTE — PROGRESS NOTES
1630: [facility] Hemodynamic Monitoring     HR 77 MAP 67        Baseline assessment:        CO 4.9          CI 3        SVI 43        SVV 14        TPR 1040

## 2023-02-25 NOTE — PROGRESS NOTES
[facility] Hemodynamic Monitoring     Visit Vitals  BP (!) 75/56   Pulse 75   Temp 96.8 °F (36 °C)   Resp 18   Ht 5' 6\" (1.676 m)   Wt 67.7 kg (149 lb 4 oz)   SpO2 96%   BMI 24.09 kg/m²         Baseline assessment:        CO 4.6        CI 2.7        SVI 35        SVV 14        TPR 1207

## 2023-02-25 NOTE — PROGRESS NOTES
NICOM Hemodynamic Monitoring     HR 76 MAP 61        Baseline assessment:        CO 5.3        CI 3.1        SVI 42        SVV 14

## 2023-02-25 NOTE — PROGRESS NOTES
CRITICAL CARE NOTE      Name: Bayron Carvajal   : 1950   MRN: [id number]   Date: 2023      Reason for ICU Admission: Acute on chronic HFrEF, LVAD workup     ICU PROBLEM LIST   Acute on chronic HFrEF (20%) NYHA IIIb/IV (D) on home inotropic support, life vest  TANNER on CKD3  CHB post op  Aflutter w/ RVR  Acute on chronic hypoxemic respiratory failure  CAP  CAD  Gastric neuroendocrine tumor  Hx of LUE DVT  DM  PAD  TRISTAN  BPH w/ urinary retention requiring chronic donnelly    HISTORY OF PRESENT ILLNESS:   Pt is a 71 y.o M w/ PMH as noted above admitted to [facility] on  due to ongoing symptoms secondary to his HFrEF. Treated for possible CAP, and diuresed for acute on chronic HFrEF. Nephrology following for TANNER on CKD 3. AHF team recommended transfer to CVICU for [facility] placement and ongoing LVAD workup, with placement 2/15. Pt extubated , however with development of worsening renal dysfunction overnight and unable to tolerate CRRT so was reintubated and taken for Impella RP placement for right-sided support in a.m. of  and CRRT resumed.     24 HOUR EVENTS:   No acute events     NEUROLOGICAL:    -No focal deficits  -No weakness  -started Dilaudid to assist with sedation   -Daily awakening trials    PULMONOLOGY: Acute hypoxic respiratory failure   -increased secretions  -Continue to monitor oxygen saturations  -Continue mechanical ventilation   -Wean as tolerated   -Chest x-ray reviewed     CARDIOVASCULAR: STEMI, multivessel disease, acute on chronic congestive heart failure   Status post LVAD   -Flows 3-4    -Speed 4600  Status post Impella RP placement, P6   -Purge flow 3.6   -Purge pressures 536  -probnp 10,977  -Continue to monitor hemodynamic status  [email or web address]  -proBNP 9387 slightly elevated compared to yesterday  -Wean as tolerated  -EF 20 to 25%  -Continue heparin  -CVP goal 10-12  -Mean arterial pressure goal greater than 70  -Continue to monitor chest tube output    GASTROINTESTINAL: Continue enteral feeds    RENAL/ELECTROLYTE/FLUIDS: TANNER, chronic kidney disease   -Continue to monitor urine output   -BUN 23  -Creatinine 1.64  -Continue CRRT    -Removal right 100 cc/hour  -Place electrolytes as indicated  -Nephrology following  strict I/Os,   Continue CRRT    -pull off 100 cc/hr of fluid  -Vasquez in place    ENDOCRINE:   -Continue to monitor blood glucose levels   -Maintain blood glucose levels between 140-180  -Continue IV insulin    HEMATOLOGY/ONCOLOGY: Gastric neuroendocrine tumor   -Hemoglobin 7.3  -Platelets 331  -Continue heparin    ID/MICRO: Community-acquired pneumonia   -WBC 6.7  -Procalcitonin 4.88  -Lactic acid 1.9  -MRSA culture 20 February 2023 negative  -Continue antibiotics (Flagyl, vancomycin and Zosyn)  -Blood cultures 28 January 2023 no growth in 5 days  -Blood cultures 20 February 2023 no growth in 2 days  -Respiratory cultures 20 February 2023 no growth in 2 days  -Urine culture 20 February 2023    -YEAST Abnormal  P   -Continue antibiotics vancomycin, Flagyl and Zosyn  -Continue antifungal (Diflucan)    ICU DAILY CHECKLIST     Code Status:Full  DVT Prophylaxis:heparin  T/L/D: ETT, Impella PIV,  Vasquez,  V  wire,  valentin  x5, LVAD drive line  SUP: PPI  Diet: NPO  Activity Level:ad jose f  ABCDEF Bundle/Checklist Completed:Yes  Disposition: Stay in ICU  Multidisciplinary Rounds Completed:  yes  Patient/Family Updated: Yes    Tatiana   2/3 Transferred to CVICU for [facility] placement  2/7 started on dobutamine as adjunct to milrinone  2/15 LVAD implant  2/16 extubated  2/18 Impella RP placement    SUBJECTIVE:     As noted above    Review of Systems:     Unable to perform due to patient intubated    OBJECTIVE:     Labs and Data: Reviewed 02/25/23  Medications: Reviewed 02/25/23  Imaging: Reviewed 02/25/23    General - sedated, intubated chronically ill appearing  HEENT- pupils equal, nystagmus, anicteric  Neuro - sedated, no focal deficit  CV - Paced,  VAD hum, post sternotomy, valentin  x5  Resp - rales b/l, NC  Abd - soft, distended, nontender, no guarding  MSK- intact, no sacral ulcer  Right femoral Impella, LVAD driveline in left chest    Visit Vitals  BP (!) 75/56   Pulse 91   Temp (!) 96.1 °F (35.6 °C)   Resp 19   Ht 5' 6\" (1.676 m)   Wt 67.7 kg (149 lb 4 oz)   SpO2 96%   BMI 24.09 kg/m²    O2 Flow Rate (L/min): 20 l/min O2 Device: Endotracheal tube, Ventilator Temp (24hrs), Av.7 °F (36.5 °C), Min:96.1 °F (35.6 °C), Max:99.5 °F (37.5 °C)    CVP (mmHg): 9 mmHg (23 0700)      Intake/Output:     Intake/Output Summary (Last 24 hours) at 2023 1034  Last data filed at 2023 1000  Gross per 24 hour   Intake 3447.57 ml   Output 3744 ml   Net -296.43 ml       Imaging    23    ECHO ADULT FOLLOW-UP OR LIMITED 2023    Interpretation Summary    Left Ventricle: EF by visual approximation is 20%. Left ventricle is mildly dilated. Mitral Valve: Moderately thickened leaflet, at the anterior and posterior leaflets. Moderately calcified leaflet. Moderate regurgitation. Left Atrium: Left atrium is moderately dilated. Technical qualifiers: Echo study was technically difficult with poor endocardial visualization. Contrast used: Definity. Limited study, not all structures viewed    Signed by: Bekah Daly MD on 2023  4:39 PM       Pertinent imaging reviewed and interpreted as noted above    CRITICAL CARE DOCUMENTATION  I had a face to face encounter with the patient, reviewed and interpreted patient data including clinical events, labs, images, vital signs, I/O's, and examined patient.   I have discussed the case and the plan and management of the patient's care with the consulting services, the bedside nurses and the respiratory therapist.      NOTE OF PERSONAL INVOLVEMENT IN CARE   This patient has a high probability of imminent, clinically significant deterioration, which requires the highest level of preparedness to intervene urgently. I participated in the decision-making and personally managed or directed the management of the following life and organ supporting interventions that required my frequent assessment to treat or prevent imminent deterioration. I personally spent 40 minutes of critical care time. This is time spent at this critically ill patient's bedside actively involved in patient care as well as the coordination of care. This does not include any procedural time which has been billed separately.     Brock Rosas MD  Staff [facility]

## 2023-02-25 NOTE — PROGRESS NOTES
0800: Dr. Melody Guillaume here after rounds getting updates from the nurse. Dr. Barbara Nagel rounded updates given. New orders for 3MG cathflo in 50ml bag to run continuously through [address] purge port. [employer] rep here gettting updates. Discussing  [address] with Dr Barbara Nagel. Dr. Júnior Stovall will be in later to see pt. 0930 Dr. Barbara Nagel spoke to [facility] over the phone and so did Jose HARRIS see her note. 1000: ECHO done at bedside. 1300 Impella purge flows improving Dr. Júnior Stovall here did a quick wean to P3 pt did not fail quickly. NOw the P level set at  P6.    1600 Purges flow continue to improve. Wife called for an update. She will come in tomorrow. 1700 Dr. Júnior Stovall here again to see pt.     1800 Left groin dressing changes. Surgicel placed at site. 1930: Bedside and Verbal shift change report given to [address] [place] [address] Katherine  (oncoming nurse) by Janet Jarvis RN (offgoing nurse). Report included the following information Intake/Output, MAR, Med Rec Status, and Cardiac Rhythm 1st AVBLOCK .

## 2023-02-25 NOTE — PROGRESS NOTES
Bedside and Verbal shift change report given to 1906 Darwin Murphy (oncoming nurse) by Josseline Orta (offgoing nurse). Report included the following information SBAR, Procedure Summary, Recent Results, and Cardiac Rhythm SR w/1st degree AVB . 0340 Titrated levophed from 1 to 2 for MAP 61. MAP briefly increased to 105, causing low flow alarm with pump flow 2.4, PI 10.6. Levophed paused. Alarm and BP self corrected within 60 seconds with no intervention. Levo restarted at 2. Bedside and Verbal shift change report given to Josseline Orta (oncoming nurse) by 1906 Darwin Murphy (offgoing nurse). Report included the following information SBAR, Recent Results, and Cardiac Rhythm 1st degree AVB .

## 2023-02-25 NOTE — PROGRESS NOTES
Critical Care Note      Cardiac surgery was called for the evaluation and management of: Stage D heart failure, cardiogenic shock      The critical nature of the patient's condition includes the following: the patient is at imminent risk of clinical deterioration and death      Critical care diagnoses that are being treated:     Cardiogenic shock   RV failure     Cardiogenic Shock   Remains intubated and sedated  Continues on  and intermittent Levophed  Lactic acid 1.9  Creatinine 1.9  LFTs 70's  Discussed with ICU and heart failure teams   Still pulmonary edema on CXR      RV failure  Continues on RVAD support  Continues on Epi  Still drops pressure and LVAD flows with RP wean      Renal failure   Continues on CVVH  Pulling factor (-) 50             Intake/Output Summary (Last 24 hours) at 2023 1139  Last data filed at 2023 1059  Gross per 24 hour   Intake 3405.89 ml   Output 4345 ml   Net -939.11 ml      Visit Vitals  BP (!) 75/56   Pulse 91   Temp (!) 96.1 °F (35.6 °C)   Resp 19   Ht 5' 6\" (1.676 m)   Wt 149 lb 4 oz (67.7 kg)   SpO2 96%   BMI 24.09 kg/m²      CXR Results  (Last 48 hours)                 23 0424  XR CHEST PORT Final result    Impression:      1. The ET tube is in satisfactory position. Remaining support devices are   stable. 2. Ongoing edema. Narrative:  EXAM: XR CHEST PORT       DATE: 2023 4:24 AM       INDICATION: Impella surveillance       COMPARISON: Chest radiograph 2023       FINDINGS: AP portable chest radiograph. There is an LVAD in place. There is an   Impella device in stable position. The ET tube is in satisfactory position. NG   tube courses to the abdomen. RIGHT IJ catheter and LEFT IJ catheter are stable   in position. The heart size is normal. There is ongoing moderate edema. No new   consolidation is seen. There is no pneumothorax.            23 0515  XR CHEST PORT Final result    Impression:  Support apparatus in stable and expected positions with slight   improvement in diffuse pulmonary edema. Narrative:  EXAM:  XR CHEST PORT       INDICATION: Impella surveillance. COMPARISON: Chest x-ray 2/23/2023. TECHNIQUE: Semiupright portable chest AP view       FINDINGS: Endotracheal tube, enteric tube, Impella device, right and left   central venous catheters, and AICD device projects in stable and expected   positions. Lungs appear slightly cleared with persistent diffuse vascular   prominence and interstitial prominence. No pleural effusion or pneumothorax. The   cardiac silhouette is within normal limits. The visualized bones and upper   abdomen are age-appropriate. LVAD   Pump Speed (RPM): 4600  Pump Flow (LPM): 3.1  PI (Pulsitility Index): 6.3  Power: 2.9   Test: No  Back Up  at Bedside & Labeled: Yes  Power Module Test: No  Driveline Site Care: Yes  Driveline Dressing: Changed per order  Testing  Alarms Reviewed: Yes  Back up SC speed: 4600  Back up Low Speed Limit: 4200  Emergency Equipment with Patient?: Yes  Emergency procedures reviewed?: Yes  Drive line site inspected?: No  Drive line intergrity inspected?: Yes  Drive line dressing changed?: No    I personally spent the above critical care time. This is time spent at this critically ill patient's bedside / unit / floor actively involved in patient care as well as the coordination of care. This does not include any procedural time which has been billed separately. This does not include any NP/PA patient care time. I had a face to face encounter with the patient, reviewed and interpreted patient data including clinical events, labs, images, vital signs, I/O's, and examined patient. I have discussed the case and the plan and management of the patient's care with the consulting services and bedside nurses.        This patient has a high probability of imminent, clinically significant deterioration, which requires the highest level of preparedness to intervene urgently. I participated in the decision-making and personally managed or directed the management of life and organ supporting interventions to treat or prevent imminent deterioration. This patient has a critical illness or injury that is acutely impairing one or more vital organ systems such that there is a high probability of imminent or life threatening deterioration in the patient's condition. This patient requires high complexity medical decision making to assess, manipulate, and support vital organ system failure. After stabilization, this patient still requires management to prevent further life / organ threatening deterioration.

## 2023-02-25 NOTE — PROGRESS NOTES
Critical Care Note      Cardiac surgery was called for the evaluation and management of: Stage D heart failure, cardiogenic shock      The critical nature of the patient's condition includes the following: the patient is at imminent risk of clinical deterioration and death      Critical care diagnoses that are being treated:     Cardiogenic shock   RV failure     Cardiogenic Shock   Remains intubated and sedated  Continues on  and intermittent Levophed  Lactic acid 1.9  Creatinine 1.9  LFTs 70's  Discussed with ICU and heart failure teams      RV failure  Continues on RVAD support  Continues on Epi  Drops pressure and LVAD flows with RP wean      Renal failure   Continues on CVVH  Pulling factor (-) 50              Intake/Output Summary (Last 24 hours) at 2023 1137  Last data filed at 2023 1059  Gross per 24 hour   Intake 3405.89 ml   Output 4345 ml   Net -939.11 ml      Visit Vitals  BP (!) 75/56   Pulse 91   Temp (!) 96.1 °F (35.6 °C)   Resp 19   Ht 5' 6\" (1.676 m)   Wt 149 lb 4 oz (67.7 kg)   SpO2 96%   BMI 24.09 kg/m²      CXR Results  (Last 48 hours)                 23 0424  XR CHEST PORT Final result    Impression:      1. The ET tube is in satisfactory position. Remaining support devices are   stable. 2. Ongoing edema. Narrative:  EXAM: XR CHEST PORT       DATE: 2023 4:24 AM       INDICATION: Impella surveillance       COMPARISON: Chest radiograph 2023       FINDINGS: AP portable chest radiograph. There is an LVAD in place. There is an   Impella device in stable position. The ET tube is in satisfactory position. NG   tube courses to the abdomen. RIGHT IJ catheter and LEFT IJ catheter are stable   in position. The heart size is normal. There is ongoing moderate edema. No new   consolidation is seen. There is no pneumothorax.            23 0515  XR CHEST PORT Final result    Impression:  Support apparatus in stable and expected positions with slight improvement in diffuse pulmonary edema. Narrative:  EXAM:  XR CHEST PORT       INDICATION: Impella surveillance. COMPARISON: Chest x-ray 2/23/2023. TECHNIQUE: Semiupright portable chest AP view       FINDINGS: Endotracheal tube, enteric tube, Impella device, right and left   central venous catheters, and AICD device projects in stable and expected   positions. Lungs appear slightly cleared with persistent diffuse vascular   prominence and interstitial prominence. No pleural effusion or pneumothorax. The   cardiac silhouette is within normal limits. The visualized bones and upper   abdomen are age-appropriate. LVAD   Pump Speed (RPM): 4600  Pump Flow (LPM): 3.1  PI (Pulsitility Index): 6.3  Power: 2.9   Test: No  Back Up  at Bedside & Labeled: Yes  Power Module Test: No  Driveline Site Care: Yes  Driveline Dressing: Changed per order  Testing  Alarms Reviewed: Yes  Back up SC speed: 4600  Back up Low Speed Limit: 4200  Emergency Equipment with Patient?: Yes  Emergency procedures reviewed?: Yes  Drive line site inspected?: No  Drive line intergrity inspected?: Yes  Drive line dressing changed?: No       I personally spent the above critical care time. This is time spent at this critically ill patient's bedside / unit / floor actively involved in patient care as well as the coordination of care. This does not include any procedural time which has been billed separately. This does not include any NP/PA patient care time. I had a face to face encounter with the patient, reviewed and interpreted patient data including clinical events, labs, images, vital signs, I/O's, and examined patient. I have discussed the case and the plan and management of the patient's care with the consulting services and bedside nurses.        This patient has a high probability of imminent, clinically significant deterioration, which requires the highest level of preparedness to intervene urgently. I participated in the decision-making and personally managed or directed the management of life and organ supporting interventions to treat or prevent imminent deterioration. This patient has a critical illness or injury that is acutely impairing one or more vital organ systems such that there is a high probability of imminent or life threatening deterioration in the patient's condition. This patient requires high complexity medical decision making to assess, manipulate, and support vital organ system failure. After stabilization, this patient still requires management to prevent further life / organ threatening deterioration.

## 2023-02-25 NOTE — PROGRESS NOTES
0800: Assumed care assessment done . Pt is awake   Even on high dose Fentanyl. 0830: Dr. Denia Parrish to round update given. Pt will stay at P6.on the Impella    1000: Pt is having large amounts of liquid stool. Fecal management system inserted to monitor amount and protect pt's skin. 1430: Family in to visit, pt is now on Dilaudid drip for pain and this is helping him rest in between procedures. Pt aware and interacted with family members. 1700 after trying pt at a factor of 75 . Dr. Eda Flores called and orders given to increase factor by 25 each hour or 2  continue as long as pt tolerates it. Stopped at a factor of 200.    1800: Pt tolerating increasing the factor to 100.  1930: Bedside and Verbal shift change report given to [id number] [facility]  (oncoming nurse) by Destiny Nixon RN (offgoing nurse). Report included the following information Procedure Summary, Intake/Output, MAR, Recent Results, and Cardiac Rhythm first degree AVB .

## 2023-02-25 NOTE — PROGRESS NOTES
Call transferred from [address] [facility], [address] in [facility] on his honeymoon , found a lump that's tender to touch,under breast bone, blood in stool today- advised medical center there , verbalized understanding [facility]IC[facility] Hemodynamic Monitoring     HR 73 MAP 73         Baseline assessment:        CO 4.4        CI 2.6        SVI 35        SVV 13        TPR 1080

## 2023-02-25 NOTE — DIALYSIS
CRRT / [phone]    Orders   Mode: CVVHD restarted @ 0325   Blood Flow Rate: 250 ml/min   Prismasol Dose: Dialysate Flow Rate: 1750 ml/hr   Prismasol Concentrate: 4K / 2.5Ca   Blood Warmer Temp: 37*C   Net Fluid Removal: Currently 50 ml/hr   Heparin: Systemic gtt infusing     Metrics   BP: 75/56   HR: 87   Access Pressure: -40 (lines reversed)   Filter Pressure: 121   Return Pressure: 85 (Lines reversed)   TMP: 21   Pressure Drop: 14     Access   Type & Location: LIJ temporary non-tunneled CVC, dressing C/D/I with bio-patch dated 02/24/23. No signs of redness, drainage, or infection visualized. Each catheter limb disinfected for 60 seconds per limb with alcohol swabs. Caps removed, dialysis CVC hub scrubbed with Prevantics for 15 seconds, followed by a 5 second dry time per Hospital P&P. +asp/+flush x 2 ports. Labs   HBsAg (Antigen) / date: Negative  02/18/23                                              HBsAb (Antibody) / date: Susceptible  02/18/23   Source: [facility]     Safety:   Time Out Done:   (Time) 0310   Consent obtained/signed: Verified   Education: Access/Site Care   Primary Nurse Rpt Pre: Efra RN   Primary Nurse Rpt Post: LORI Kraus     Comments / Plan:   Jazmin Cee RN at bedside to change filter d/t pt PDs rising and increased overall filter pressures. Patient, code status, labs, and orders verified. Consents on chart. Time out completed. Jazmin Cee RN able to return all pt blood from circuit (165 mls.) Old set discarded in red biohazard bin. HF-1000 filter set-up, primed w/ 1L NS, tested and running well. Lines visible and connections secure with blood warmer to return line at 37*C. Education, pre and post to primary RN. Pt lines running reversed on start and pt also has a systemic heparin gtt infusing.

## 2023-02-26 NOTE — PROGRESS NOTES
Critical Care Note      Cardiac surgery was called for the evaluation and management of: Stage D heart failure, cardiogenic shock      The critical nature of the patient's condition includes the following: the patient is at imminent risk of clinical deterioration and death      Critical care diagnoses that are being treated:     Cardiogenic shock   RV failure     Cardiogenic Shock   Remains intubated and sedated  Weaned off  and Levo  Lactic acid 1.5  Creatinine 1.6  LFTs 80's  NT pro-BNP 9K     RV failure  Continues on RVAD support  Continues on Epi  Pressure still drop with RP wean   Will leave Impella at P-6      Renal failure   Continues on CVVH  Pulling factor (-) 50   Will check with renal to see if we can increase factor           Intake/Output Summary (Last 24 hours) at 2023 0845  Last data filed at 2023 0800  Gross per 24 hour   Intake 3419.85 ml   Output 3909 ml   Net -489.15 ml      Visit Vitals  BP (!) 91/58   Pulse 71   Temp (!) 96.6 °F (35.9 °C)   Resp 18   Ht 5' 6\" (1.676 m)   Wt 144 lb 2.9 oz (65.4 kg)   SpO2 99%   BMI 23.27 kg/m²      CXR Results  (Last 48 hours)                 23 0424  XR CHEST PORT Final result    Impression:      1. The ET tube is in satisfactory position. Remaining support devices are   stable. 2. Ongoing edema. Narrative:  EXAM: XR CHEST PORT       DATE: 2023 4:24 AM       INDICATION: Impella surveillance       COMPARISON: Chest radiograph 2023       FINDINGS: AP portable chest radiograph. There is an LVAD in place. There is an   Impella device in stable position. The ET tube is in satisfactory position. NG   tube courses to the abdomen. RIGHT IJ catheter and LEFT IJ catheter are stable   in position. The heart size is normal. There is ongoing moderate edema. No new   consolidation is seen. There is no pneumothorax.                      LVAD   Pump Speed (RPM): 4600  Pump Flow (LPM): 3  MAP: 80  PI (Pulsitility Index): 3.7  Power: 2.8   Test: Yes  Back Up  at Bedside & Labeled: Yes  Power Module Test: Yes  Driveline Site Care: Yes  Driveline Dressing: Clean, Dry, and Intact  Testing  Alarms Reviewed: Yes  Back up SC speed: 4600  Back up Low Speed Limit: 4200  Emergency Equipment with Patient?: Yes  Emergency procedures reviewed?: Yes  Drive line site inspected?: No  Drive line intergrity inspected?: Yes  Drive line dressing changed?: No         I personally spent the above critical care time. This is time spent at this critically ill patient's bedside / unit / floor actively involved in patient care as well as the coordination of care. This does not include any procedural time which has been billed separately. This does not include any NP/PA patient care time. I had a face to face encounter with the patient, reviewed and interpreted patient data including clinical events, labs, images, vital signs, I/O's, and examined patient. I have discussed the case and the plan and management of the patient's care with the consulting services and bedside nurses. This patient has a high probability of imminent, clinically significant deterioration, which requires the highest level of preparedness to intervene urgently. I participated in the decision-making and personally managed or directed the management of life and organ supporting interventions to treat or prevent imminent deterioration. This patient has a critical illness or injury that is acutely impairing one or more vital organ systems such that there is a high probability of imminent or life threatening deterioration in the patient's condition. This patient requires high complexity medical decision making to assess, manipulate, and support vital organ system failure. After stabilization, this patient still requires management to prevent further life / organ threatening deterioration.

## 2023-02-26 NOTE — PROGRESS NOTES
Bedside and Verbal shift change report given to 1906 Darwin Murphy (oncoming nurse) by Jonathan Denny (offgoing nurse). Report included the following information SBAR and Recent Results. 2873 Dr. aHrini Pierce notified of high maps >90, refractory to PRN dilaudid. Dr. Harini Pierce to bedside, orders to start low dose propofol, decrease precedex. Bedside and Verbal shift change report given to Mary Moerno (oncoming nurse) by 1906 Darwin Murphy (offgoing nurse). Report included the following information SBAR and Recent Results.

## 2023-02-26 NOTE — PROGRESS NOTES
RENAL  PROGRESS NOTE        Subjective:     Remains Intubated. On CRRT. Pt lines running reversed on start and pt also has a systemic heparin gtt infusing. Objective:   VITALS SIGNS:    Visit Vitals  BP (!) 91/58   Pulse 82   Temp 97.3 °F (36.3 °C)   Resp 18   Ht 5' 6\" (1.676 m)   Wt 65.4 kg (144 lb 2.9 oz)   SpO2 99%   BMI 23.27 kg/m²       O2 Device: Endotracheal tube, Heated, Ventilator   O2 Flow Rate (L/min): 20 l/min   Temp (24hrs), Av.8 °F (36 °C), Min:95.7 °F (35.4 °C), Max:97.5 °F (36.4 °C)         PHYSICAL EXAM:  Intubated  +ETT  + LE edema      DATA REVIEW:     INTAKE / OUTPUT:   Last shift:       07 - 1900  In: 191.9 [I.V.:91.9]  Out: 451 [Drains:100]  Last 3 shifts: 1901 -  0700  In: 5950 [I.V.:3586]  Out: 0285 [Drains:450]    Intake/Output Summary (Last 24 hours) at 2023 0913  Last data filed at 2023 0900  Gross per 24 hour   Intake 3284.65 ml   Output 3906 ml   Net -621.35 ml           LABS:   Recent Labs     23  0503 23  0409 23  0259   WBC 8.1 6.7 9.6   HGB 7.5* 7.3* 7.6*   HCT 25.3* 23.9* 23.4*   * 109* 93*       Recent Labs     23  0503 23  1727 23  0409 23  1636 23  0259    140 138  --  137   K 4.5 4.4 4.5  --  4.2    106 106  --  106   CO2   --     * 142* 150*  --  108*   BUN 25* 23* 23*  --  22*   CREA 1.56* 1.58* 1.64*  --  1.71*   CA 8.7 8.6 8.7  --  8.6   MG 3.2*  --  3.0*  --  3.0*   PHOS  --  2.0* 2.3* 2.0* 2.2*   ALB 3.7 3.3* 3.3*  --  3.6   TBILI 15.3*  --  14.9*  --  15.9*   ALT 23  --  14  --  14   INR 2.6*  --  2.1*  --  1.7*           Assessment:  TANNER on CKD-3a: Suspect cardiorenal effects with needed diuresis. Now has LVAD and  POST OP ATN. Anuric->Requiring CRRT     Ischemic CM: Recurrent exacerbations. EF 20%. S/p LVAD on 2/15. Required Impella RP for RV support-> attempts to wean not tolerated by patient.      Sepsis: Urosepsis-> growing candida    BPH     Well differentiated NET Grade 1: noted on EGD 1/18/23     Anemia 2 to CKD:  s/p PRBCS      Left UE basilic and radial vein thrombus    Thrombocytopenia       Plan/Recommendations:  Seen on CVVHD. Factor rate-> 50ml/hr --> titrate up by 25 ml/hr as long as tolerated. Made it up to a factor 100, but is currently back to a factor 75. Ct 4K Prismasol bags (K stable at 4.5 - trend).   Levophed/Epi  Intermittent bladder scans  Daily CRRT labs   Avoid nephrotoxins    Linda Pedraza MD  [facility]

## 2023-02-26 NOTE — DIALYSIS
CRRT / [phone]    Orders   Mode: CVVHD rounding   Blood Flow Rate: 250 ml/min   Prismasol Dose: Dialysate Flow Rate: 1750 ml/hr   Prismasol Concentrate: 4K / 2.5Ca   Blood Warmer Temp: 37*c   Net Fluid Removal: Currently 100 ml/hr     Metrics   BP: 91/58   HR: 75   Access Pressure: -85 (lines reversed)   Filter Pressure: 186   Return Pressure: 87 (lines reversed)   TMP: 47   Pressure Drop: 70     Access   Type & Location: [facility] non-tunneled CVC C/D/I with transparent dressing and biopatch in place dated 02/24/23. Labs   HBsAg (Antigen) / date: Negative  02/18/23                                              HBsAb (Antibody) / date: Susceptible  02/18/23   Source: [employer]     Safety:   Time Out Done:   (Time) 7564   Consent obtained/signed: Verified   Education: Access/Site Care   Primary Nurse Rpt Pre: LORI Kraus   Primary Nurse Rpt Post: LORI Kraus     Comments / Plan:   Patient, orders, line and consent verified. Labs, notes and code status reviewed. [id number] filter running well with no indications for change at this time. Lines visible/secure with blood warmer attached to the return line and set to 37C*. Education and Pre/Post with primary RN. Pt running with lines reversed and pt also has a systemic heparin gtt infusing.

## 2023-02-26 NOTE — PROGRESS NOTES
0800- bedside report and drips verfied. Patient intubated (AC 18/350/40%/5), sedated (prop 5mcg, dilaudid 1 mg PCA), LVAD, impella RP (P6). Drips: epi 4 mcg, heparin, insulin)  NICOM Hemodynamic Monitoring     Visit Vitals  BP (!) 91/58   Pulse 79   Temp 97.7 °F (36.5 °C)   Resp 19   Ht 5' 6\" (1.676 m)   Wt 65.4 kg (144 lb 2.9 oz)   SpO2 99%   BMI 23.27 kg/m²         Baseline assessment:        CO 3.9        CI 2.3        SVI 32        SVV 10        TPR 1246      0815Adams MD Sudeep and Wanda Rich NP at bedside for rounds. Plan to wean epi as tolerated. 1050- factor decreased to 0. MAP 59, CVP 8.    1055- MAP not improving; propofol stopped and albumin 25% given. NICOM performed; CI 3.    1120- patient waking up and moving arms. Will repond to voice and squeeze hands, and move feet. 1200- NICOM Hemodynamic Monitoring     Visit Vitals  BP (!) 91/58   Pulse 81   Temp 96.8 °F (36 °C)   Resp 23   Ht 5' 6\" (1.676 m)   Wt 65.4 kg (144 lb 2.9 oz)   SpO2 97%   BMI 23.27 kg/m²         Baseline assessment:        CO 5.9        CI 3.4        SVI 34        SVV 15               1226- MAP stable and able to start epi wean. Now at 3 mcg; MAP 89.    1416- heparin therapeutic, per protocol will recheck in 6 hours. 1600- NICOM Hemodynamic Monitoring     Visit Vitals  BP (!) 91/58   Pulse 81   Temp 96.8 °F (36 °C)   Resp 23   Ht 5' 6\" (1.676 m)   Wt 65.4 kg (144 lb 2.9 oz)   SpO2 97%   BMI 23.27 kg/m²         Baseline assessment:        CO 5.5        CI 3.2        SVI 37        SVV 16            1900- epi titrated up to 3 mcg for labile MAP's 59.    2000- Bedside and Verbal shift change report given to Mina Bill RN (oncoming nurse) by Umair Deal RN (offgoing nurse). Report included the following information SBAR, Kardex, Recent Results, and Cardiac Rhythm 1st degree .

## 2023-02-26 NOTE — PROGRESS NOTES
Critical care progress note. Little change in his condition, attempt to drop the Impella from 65 led to drop in systolic blood pressure, went back up to 6. No change in LVAD. Epinephrine minimally dropped. Still on propofol and Dilaudid for sedation. Appears to be lightly sedated but not uncomfortable. No change in gas exchange. Tolerating CRRT but the net negative had to be dropped. LVAD in place. Impella for right ventricle. CRRT running as mentioned. Review of systems could not be obtained. Medications in the hospital reviewed. On examination he is intubated ventilated sedated  On mechanical and inotropic circulatory support blood pressure systolic varied between 89 and 95 MAP between 63 and 70. Heart rate around 90, afebrile. Head examination revealed conjunctival pallor. .  The neck was supple, jugular venous pressure could not be appreciated. Central lines and dialysis catheter in place. Chest showed decreased air entry with scattered fine crackles. No wheezes. Air entry symmetrical.  Heart sounds were regular S1 and S2. Abdomen was soft, could not appreciate bowel sounds. Lower extremities showed close to moderate bilateral edema. X-ray performed today was reviewed and showed diffuse pulmonary edema, little change from yesterday. Lines and equipment in their extensions in line with no change since yesterday. Labs from today reviewed and showed hemoglobin 7.5 compared to 7.3 yesterday. WBCs today 8.1 and platelets 528 compared to 109 yesterday and 93 from February 24, 1980 from February 23 and 60 from February 22. Today sodium was 139 potassium 4.5 chloride 106 bicarbonate 25 BUN 25 creatinine 1.56 calcium 8.9 AST 80 ALT 23 alk phos 118    Assessment:  1. Acute respiratory failure due to cardiac failure with severe LV and RV dysfunction. He is on the ventilator due to severe cardiac dysfunction primarily. He also developed a degree of pulmonary edema.   2.  Both ventricular function is dependent on mechanical circulatory support and inotropes, epinephrine. Little change, particular no improvement. Still dependent on the same amount of support. 3.  Acute renal failure. On CRRT. Intravascular volume seem to have contracted, he needs some mobilization of his edema. Albumin is being given intermittently and that the rate of filtration has been decreased until more equalization happens. 4.  On multiple antibiotics, fluconazole just started for Candida species in the urine. Otherwise he appears to be on empiric antibiotics antibacterials for about a week now. Plan:   1. continue LVAD and Impella at the same level of support for today. 2.  May decrease epinephrine very gradually and as tolerated. #3 continue sedation for comfort mostly using Dilaudid. 3.  CRRT as tolerated and per nephrology. Aiming for negative fluid balance slowly mainly in the form of peripheral edema and pulmonary edema. 4.  Continue fluconazole. Send blood culture for fungus. Finish a week of IV antibacterials. Critical care time excluding any procedures 45 minutes.

## 2023-02-27 NOTE — PROGRESS NOTES
RENAL  PROGRESS NOTE        Subjective:     Remains Intubated. On CRRT. No factor  Objective:   VITALS SIGNS:    Visit Vitals  BP (!) 87/55   Pulse 72   Temp 97 °F (36.1 °C)   Resp 15   Ht 5' 6\" (1.676 m)   Wt 62.2 kg (137 lb 2 oz)   SpO2 100%   BMI 22.13 kg/m²       O2 Device: Endotracheal tube   O2 Flow Rate (L/min): 20 l/min   Temp (24hrs), Av.7 °F (36.5 °C), Min:96.6 °F (35.9 °C), Max:99.1 °F (37.3 °C)         PHYSICAL EXAM:  Intubated  +ETT  + LE edema      DATA REVIEW:     INTAKE / OUTPUT:   Last shift:       07 - 0  In: 728.2 [I.V.:394.2]  Out: 710 [Drains:30]  Last 3 shifts: 1901 -  0700  In: 5011.6 [I.V.:3437.6]  Out: 9080 [Drains:680]    Intake/Output Summary (Last 24 hours) at 2023 1127  Last data filed at 2023 1100  Gross per 24 hour   Intake 3609.93 ml   Output 3861 ml   Net -251.07 ml           LABS:   Recent Labs     23  0312 23  0503 23  0409   WBC 8.9 8.1 6.7   HGB 7.3* 7.5* 7.3*   HCT 24.2* 25.3* 23.9*    134* 109*       Recent Labs     23  0311 23  1731 23  0503 23  1727 23  0409     --  139 140 138   K 4.7  --  4.5 4.4 4.5     --  106 106 106   CO2   --  25    *  --  159* 142* 150*   BUN 22*  --  25* 23* 23*   CREA 1.63*  --  1.56* 1.58* 1.64*   CA 9.3  --  8.7 8.6 8.7   MG 3.3*  --  3.2*  --  3.0*   PHOS 2.4* 1.9*  --  2.0* 2.3*   ALB 3.9  --  3.7 3.3* 3.3*   TBILI 14.4*  --  15.3*  --  14.9*   ALT 31  --  23  --  14   INR 1.9*  --  2.6*  --  2.1*           Assessment:  TANNER on CKD-3a: Suspect cardiorenal effects with needed diuresis. Now has LVAD and  POST OP ATN. Anuric->Requiring CRRT     Ischemic CM: Recurrent exacerbations. EF 20%. S/p LVAD on 2/15. Required Impella RP for RV support-> attempts to wean not tolerated by patient.      Sepsis: Urosepsis-> growing candida    BPH     Well differentiated NET Grade 1: noted on EGD 23     Anemia 2 to CKD:  s/p PRBCS Left UE basilic and radial vein thrombus    Thrombocytopenia       Plan/Recommendations:  Seen on CVVH .   Factor rate->  0  Switch to 2 K Prismasol bags    Levophed/Epi  Intermittent bladder scans  Daily CRRT labs    IV sodium phos    Discussed with LORI medina MD  [facility]

## 2023-02-27 NOTE — PROGRESS NOTES
Cardiac surgery coordinator- Met with Mrs Liliana Lima and her daughter. Reviewed plan of care and encouraged them to verbalize. Will continue to follow.

## 2023-02-27 NOTE — PROGRESS NOTES
CRITICAL CARE NOTE      Name: Sandra Kraus   : 1950   MRN: [id number]   Date: 2023      Reason for ICU Admission: Acute on chronic HFrEF, LVAD workup     ICU PROBLEM LIST   Acute on chronic HFrEF (20%) NYHA IIIb/IV (D) on home inotropic support, life vest  TANNER on CKD3  CHB post op  Aflutter w/ RVR  Acute on chronic hypoxemic respiratory failure  CAP  CAD  Gastric neuroendocrine tumor  Hx of LUE DVT  DM  PAD  TRISTAN  BPH w/ urinary retention requiring chronic donnelly    HISTORY OF PRESENT ILLNESS:   Pt is a 71 y.o M w/ PMH as noted above admitted to [facility] on  due to ongoing symptoms secondary to his HFrEF. Treated for possible CAP, and diuresed for acute on chronic HFrEF. Nephrology following for TANNER on CKD 3. AHF team recommended transfer to CVICU for [facility] placement and ongoing LVAD workup, with placement 2/15. Pt extubated , however with development of worsening renal dysfunction overnight and unable to tolerate CRRT so was reintubated and taken for Impella RP placement for right-sided support in a.m. of  and CRRT resumed.     24 HOUR EVENTS:   No acute events     NEUROLOGICAL:    -No focal deficits  -No weakness  - goal: RAAS -1  - Dilaudid gtt at 1  - wean propofol to off; increase PCDX; add seroquel qhs    PULMONOLOGY: Acute hypoxic respiratory failure     -Continue to monitor oxygen saturations  -Continue mechanical ventilation   -Wean as tolerated   -Chest x-ray reviewed; improved; minimal O2 requirement    CARDIOVASCULAR: STEMI, multivessel disease, acute on chronic congestive heart failure   Status post LVAD   -Flow 3-4   -Speed 4600  Status post Impella RP placement    - P6 (brief trial P-3 with 20mmHg drop in MAP on morning rounds)    -Purge flow >6    -Purge pressures improved  -[email or web address]   -Wean as tolerated  -EF 20 to 25%  -Continue heparin  -CVP goal 12-14   -Mean arterial pressure goal greater than 65    GASTROINTESTINAL:   Continue enteral nutrition    RENAL/ELECTROLYTE/FLUIDS: TANNER, chronic kidney disease   -Continue to monitor urine output   -BUN 22  -Creatinine 1.63  -Continue CRRT    - even to 50cc/hr net negative as tolerated for CVP 12-14 and MAP > 65  -replace electrolytes as indicated  -Nephrology following  strict I/Os,   -Vasquez out; daily bladder scan    ENDOCRINE:   -Continue to monitor blood glucose levels   -Maintain blood glucose levels between 140-180  -Continue IV insulin    HEMATOLOGY/ONCOLOGY: Gastric neuroendocrine tumor   -Hemoglobin 7.3  -Platelets 217  -Continue heparin    ID/MICRO: Community-acquired pneumonia   -WBC 8.9  -Procalcitonin remains elevated 3.14  -Lactic acid 1.9  -MRSA culture 20 February 2023 negative  -Continue antibiotics (Flagyl, vancomycin and Zosyn)  -Blood cultures 28 January 2023 no growth in 5 days  -Blood cultures 20 February 2023 no growth in 2 days  -Respiratory cultures 20 February 2023 no growth in 2 days  -Urine culture 20 February 2023    -YEAST Abnormal  P   -Continue antibiotics vancomycin, Flagyl and Zosyn  -Continue antifungal (Diflucan)    ICU DAILY CHECKLIST     Code Status:Full  DVT Prophylaxis:heparin  T/L/D: ETT, Impella PIV,  Vasquez,  V  wire,  valentin  x5, LVAD drive line  SUP: PPI  Diet: NPO  Activity Level:ad jose f  ABCDEF Bundle/Checklist Completed:Yes  Disposition: Stay in ICU  Multidisciplinary Rounds Completed:  yes  Patient/Family Updated: Yes    Tatiana   2/3 Transferred to CVICU for [facility] placement  2/7 started on dobutamine as adjunct to milrinone  2/15 LVAD implant  2/16 extubated  2/18 Impella RP placement    SUBJECTIVE:     As noted above    Review of Systems:     Unable to perform due to patient intubated    OBJECTIVE:     Labs and Data: Reviewed 02/27/23  Medications: Reviewed 02/27/23  Imaging: Reviewed 02/27/23    General - sedated, intubated chronically ill appearing  HEENT- pupils equal, nystagmus, anicteric  Neuro - sedated, no focal deficit  CV - Paced,  VAD hum, post sternotomy, valentin  x5  Resp - rales b/l, NC  Abd - soft, distended, nontender, no guarding  MSK- intact, no sacral ulcer  Right femoral Impella, LVAD driveline in left chest    Visit Vitals  BP (!) 91/58   Pulse 76   Temp (!) 96.6 °F (35.9 °C)   Resp 9   Ht 5' 6\" (1.676 m)   Wt 62.2 kg (137 lb 2 oz)   SpO2 95%   BMI 22.13 kg/m²    O2 Flow Rate (L/min): 20 l/min O2 Device: Endotracheal tube, Ventilator Temp (24hrs), Av.8 °F (36.6 °C), Min:96.6 °F (35.9 °C), Max:99.1 °F (37.3 °C)    CVP (mmHg): 11 mmHg (23 0600)      Intake/Output:     Intake/Output Summary (Last 24 hours) at 2023 5924  Last data filed at 2023 0600  Gross per 24 hour   Intake 3508.31 ml   Output 2891 ml   Net 617.31 ml       Imaging    23    ECHO ADULT FOLLOW-UP OR LIMITED 2023    Interpretation Summary    Left Ventricle: EF by visual approximation is 20%. Left ventricle is mildly dilated. Mitral Valve: Moderately thickened leaflet, at the anterior and posterior leaflets. Moderately calcified leaflet. Moderate regurgitation. Left Atrium: Left atrium is moderately dilated. Technical qualifiers: Echo study was technically difficult with poor endocardial visualization. Contrast used: Definity. Limited study, not all structures viewed    Signed by: Yasmine Mendoza MD on 2023  4:39 PM       Pertinent imaging reviewed and interpreted as noted above    CRITICAL CARE DOCUMENTATION  I had a face to face encounter with the patient, reviewed and interpreted patient data including clinical events, labs, images, vital signs, I/O's, and examined patient.   I have discussed the case and the plan and management of the patient's care with the consulting services, the bedside nurses and the respiratory therapist.      NOTE OF PERSONAL INVOLVEMENT IN CARE   This patient has a high probability of imminent, clinically significant deterioration, which requires the highest level of preparedness to intervene urgently. I participated in the decision-making and personally managed or directed the management of the following life and organ supporting interventions that required my frequent assessment to treat or prevent imminent deterioration. I personally spent 50 minutes of critical care time. This is time spent at this critically ill patient's bedside actively involved in patient care as well as the coordination of care. This does not include any procedural time which has been billed separately.     Tanya Gatica MD  Staff [facility]

## 2023-02-27 NOTE — PROGRESS NOTES
Bedside and Verbal shift change report given to Adam Brown Yuki (oncoming nurse) by Rosalio Moise (offgoing nurse). Report included the following information SBAR, OR Summary, Intake/Output, and Cardiac Rhythm 1\"\" AVB .     0800 Pt opening eyes spontaneously  Follows commands in all extremities, R, sided preference  Dr. Jose M Burrell rounding      0830 Dr. Rosy Pavon and Dr. Cameron Freedman rounding  CVP 8-13, will keep even on CRRT today, factor 0    Labile BP today, sometimes requiring 1 of levophed  Otherwise just on 3 epinephrine    Dr. Jose M Burrell attempted RP wean, pt tolerating P-3 better today, MAP >65    SBT, RSBI >230    Bedside and Verbal shift change report given to Janeth Guillermo 70 (oncoming nurse) by John Cuellar RN (offgoing nurse). Report included the following information SBAR, OR Summary, Intake/Output, MAR, and Cardiac Rhythm 1\"AVB .

## 2023-02-27 NOTE — PROGRESS NOTES
Comprehensive Nutrition Assessment    Type and Reason for Visit: Reassess    Nutrition Recommendations/Plan:     Slightly increase TF of Vital 1.5 to 40 ml/hr   Continue 3 packets Prosource daily and 30 ml water flush q 4 hr         Malnutrition Assessment:  Malnutrition Status: Moderate malnutrition (01/30/23 1232)    Context:  Acute illness     Findings of the 6 clinical characteristics of malnutrition:   Energy Intake:  75% or less of est energy req for 7 or more days  Weight Loss:  5% over one month     Body Fat Loss:  Mild body fat loss, Buccal region   Muscle Mass Loss:  Mild muscle mass loss, Clavicles (pectoralis & deltoids), Thigh (quadriceps)  Fluid Accumulation:  Mild, Extremities   Strength:  Not performed        Nutrition Assessment:    PMHx: CAD s/p PCI x 2, HFrEF with EF 25-30%, DM, HTN, hypercholesterolemia, Nstemi, CKD stage III. Admitted 1/26 d/t ongoing symptoms r/t his HFrEF and LVAD work-up. Tx to CVICU on 2/3 for [facility] placement and ongoing LVAD workup. Noted, as part of LVAD work-up PTA, pt underwent EGD with bx on 1/18/23 which path revealed well-differentiated neuroendocrine tumor. Oncology consulted, no absolute contraindications to LVAD. Nephrology following for TANNER on CKD 3. LVAD placed 2/15. Pt extubated 2/17 but then had to be re-intubated 2/18 for placement of RVAD 2/18. Had plans to start CVVHD 2/17 however pt too unstable ON; started post RVAD (Impella RP support device). CRRT started 2/18. Tube feeding initiated 2/19. Liver failure with elevated T-bili (hemolysis and/or ischemic vs congestive hepatopathy). 2/27: follow-up. Remains vented on CRRT. Tolerating TF at goal.  Having looses BM 2-3x/day, but nothing excessive per RN. Propofol was at 3.9 mL/hr (103 kcal), but currently off and doing well.  Vital 1.5 @ 35 ml/hr with 3 packets Prosource daily; minimal water flush of 30 ml q 4 hr providing 770 ml, 1320 calories, 96 gm protein, 42 gm fat, 43 mEq potassium and 940 ml free water. K+ WNL, Mg high, Phos low - improved since yesterday. BG well controlled, remains on insulin drip, but plans to adjust to basal insulin soon. Given pt is more stable and with malnutrition, recommend slightly increasing TF to better meet est needs without propofol running. Even if resumed at previous rate, would still be within kcal range/ not significant overfeeding. Will slightly increase TF to Vital 1.5 @ 40 mL/hr with 1 packet Prosource TID and flushes of 30 mL h2o q 4 hours provides 880 mL, 1500 kcal, 104 gm pro, 165 gm CHO, 669+270+352=4988 mL free H2o. This meets 100% kcal needs and 95% pro needs respectively. 7.31 l/min    Temp (24hrs), Av.6 °F (36.4 °C), Min:96.6 °F (35.9 °C), Max:99.1 °F (37.3 °C)        : Pt extubated  but then had to be re-intubated  for placement of RVAD . Plan to start CVVHD  however pt too unstable ON; started post RVAD (Impella RP support device). Tube feeding ordered yesterday; RD consult placed. Liver failure with elevated T-bili (hemolysis and/or ischemic vs congestive hepatopathy). Mr Kaylee Frederick has tolerated EN well thus far. Last BM however -bowel regimen is ordered. Recommend increasing Pericolace to bid since pt requiring a fair amount of Fentanyl. Will change formula to Vital 1.5 to avoid fiber and fat content (structured lipid 2/2 elevated bilirubin). New goal: Vital 1.5 @ 35 ml/hr with 3 packets Prosource daily; continue minimal water flush of 30 ml q 4 hr. This will provide 770 ml, 1320 calories, 96 gm protein, 42 gm fat, 43 mEq potassium and 940 ml free water (tube feeding/flush) per day to meet 88% protein needs. Tube feeding and propofol will meet 100% estimated energy needs (1695 calories per day). : pt had LVAD placed 2/15 and remains intubated. Spoke with RN yesterday and again today - no plans to start TF, working on extubation now.   RN states OG is too small for tube feeds anyway and worries it would clog, PO meds held. Pt would need a DHT. Even if pt extubated, will be a slow progression of diet over weekend. DHT may need to be considered, but also with Carafate QID, tube feeds would need to be held at various points throughout the day which makes regimen more complicated, so would consider intermittent vs nocturnal feeds if needed. PO intake was improving pre-op, but overall still with significant wt loss and meeting moderate malnutrition status prior to surgery. Weight up as expected post-op (d/t fluid). Currently 150 lb, but was 123 lb on 2/13 standing scale when last seen by this service. Ve Observed 8.68 l/min, tMax:102.4 °F  [employer] EEN ~1800 kcal, which is similar to current EEN. Therefore, will not adjust given likelihood of extubation soon. If TF needed, recommend 4 cartons of Nepro - giving 1 carton over 1 hour QID that are timed around the Carafate. 4 cartons of Nepro daily with 60 mL H2O flushes 4x/day provide 948 mL, 1706 kcal, 77 gm pro, 153 gm CHO, and 692+264=283 mL free H2O.     Nutritionally Significant Medications:  Buminate, precedex, epi @ 3, diflucan, heparin gtt, dilaudid PRN, hydromorphine gtt, insulin gtt, flagyl, levo @ 1, propofol was @ 3.9 mL/hr (just turned off), NaPhos, vancomycin        Estimated Daily Nutrient Needs:  Energy Requirements Based On: Formula  Weight Used for Energy Requirements: Admission (54.4 kg)  Energy (kcal/day): 9862-8115 (25-30 kcal/kg; 1430 kcal/day using [employer] 2003)  Weight Used for Protein Requirements: Admission  Protein (g/day): 109 (2g/kg)  Method Used for Fluid Requirements: Standard renal  Fluid (ml/day): 500 mL + OP    Nutrition Related Findings:   Edema: Facial: Scleral (2/27/2023  8:00 AM)  Generalized: Pitting (2/27/2023  8:00 AM)  LLE: Pitting; 2+ (2/27/2023  8:00 AM)  LUE: Pitting; 1+ (2/27/2023  8:00 AM)  RLE: Pitting; 2+ (2/27/2023  8:00 AM)  RUE: Pitting; 1+ (2/27/2023  8:00 AM)    Last BM: 02/27/23, Loose    Wounds: Deep tissue injury, Surgical incision      Current Nutrition Therapies:  Diet: NPO  Nutrition Support: TF via DHT as above      Anthropometric Measures:  Height: 5' 6\" (167.6 cm)  Ideal Body Weight (IBW): 142 lbs (65 kg)  Admission Body Weight: 120 lb  Current Body Wt:  62.2 kg (137 lb 2 oz), 96.6 % IBW.  Bed scale  Current BMI (kg/m2): 22.1        Weight Adjustment: No adjustment                 BMI Category: Underweight (BMI less than 22) age over 72    Last 3 Recorded Weights in this Encounter    02/25/23 0619 02/26/23 0431 02/27/23 0430   Weight: 67.7 kg (149 lb 4 oz) 65.4 kg (144 lb 2.9 oz) 62.2 kg (137 lb 2 oz)     Wt Readings from Last 10 Encounters:   02/27/23 62.2 kg (137 lb 2 oz)   01/25/23 55.8 kg (123 lb)   01/23/23 54.9 kg (121 lb)   01/21/23 54.4 kg (120 lb)   01/20/23 52.2 kg (115 lb)   01/20/23 52.2 kg (115 lb)   01/19/23 53 kg (116 lb 13.5 oz)   12/19/22 58.5 kg (129 lb)   12/16/22 57.4 kg (126 lb 8.7 oz)   12/05/22 59 kg (130 lb)           Nutrition Diagnosis:   Inadequate oral intake related to impaired respiratory function, cardiac dysfunction as evidenced by intubation, nutrition support-enteral nutrition  Underweight related to inadequate protein-energy intake as evidenced by BMI    Nutrition Interventions:   Food and/or Nutrient Delivery: Modify tube feeding  Nutrition Education/Counseling: No recommendations at this time  Coordination of Nutrition Care: Continue to monitor while inpatient       Goals:  Previous Goal Met: No progress toward goal(s)  Goals: other (specify) (to meet >90% of EEN over next 4-7 days)  Specify Other Goals: tolerate TF at goal to meet >90% of EEN over next 4-7 days    Nutrition Monitoring and Evaluation:   Behavioral-Environmental Outcomes: None identified  Food/Nutrient Intake Outcomes: Enteral nutrition intake/tolerance  Physical Signs/Symptoms Outcomes: Biochemical data, GI status, Fluid status or edema, Hemodynamic status, Nutrition focused physical findings, Weight, Skin    Discharge Planning: Too soon to determine    Recent Labs     02/27/23 0311 02/26/23  1731 02/26/23  0503 02/25/23  1727 02/25/23  0409   *  --  159* 142* 150*   BUN 22*  --  25* 23* 23*   CREA 1.63*  --  1.56* 1.58* 1.64*     --  139 140 138   K 4.7  --  4.5 4.4 4.5     --  106 106 106   CO2 26  --  25 26 26   CA 9.3  --  8.7 8.6 8.7   PHOS 2.4* 1.9*  --  2.0* 2.3*   MG 3.3*  --  3.2*  --  3.0*       Recent Labs     02/27/23 0311 02/26/23  0503 02/25/23 1727 02/25/23  0409   ALT 31 23  --  14   AST 96* 80*  --  59*   * 118*  --  99   TBILI 14.4* 15.3*  --  14.9*   TP 6.1* 5.9*  --  5.4*   ALB 3.9 3.7 3.3* 3.3*   GLOB 2.2 2.2  --  2.1       Recent Labs     02/27/23 0312 02/26/23  0503   WBC 8.9 8.1   HGB 7.3* 7.5*   HCT 24.2* 25.3*    134*       No results for input(s): PREALB in the last 72 hours.   Prealbumin   Date Value Ref Range Status   02/21/2023 5.7 (L) 20.0 - 40.0 mg/dL Final   02/14/2023 15.5 (L) 20.0 - 40.0 mg/dL Final   02/07/2023 9.9 (L) 20.0 - 40.0 mg/dL Final     Recent Labs     02/27/23  1522 02/27/23  1413 02/27/23  1353 02/27/23  1241 02/27/23  1148 02/27/23  1040 02/27/23  0946 02/27/23  0840 02/27/23  0727 02/27/23  0618 02/27/23  0534 02/27/23  0533 02/27/23  0532 02/27/23  0418 02/27/23  0310 02/27/23  0215   GLUCPOC 132* 115 135* 153* 126* 90 89 132* 116 103 93 91 84 110 153* 134*       Lab Results   Component Value Date/Time    Hemoglobin A1c 7.7 (H) 01/11/2023 06:14 PM    Hemoglobin A1c 6.5 (H) 12/06/2022 03:53 AM    Hemoglobin A1c 7.0 (H) 10/12/2022 09:10 AM         Sona Jain RD   Available via [employer]

## 2023-02-27 NOTE — DIABETES MGMT
[address] [place] [address]  DIABETES MANAGEMENT CONSULT    Consulted by  Leyla Guo MD   for advanced nursing evaluation and care for inpatient blood glucose management. Evaluation and Action Plan   Roselia Cee is a 71year old gentleman, with Type 2 Diabetes with a recent A1C of 7.7%, who is admitted with arrhythmias and acute on chronic HFrEF with failure to respond to inotrope support. He was discharged one week prior from a prolonged hospital stay for acute on chronic HF and LVAD work-up and discharged home on dobutamine with a lifevest.  Glucose control was tenuous during his previous admission s/t multiple co-morbidities, several tests/procedures requiring NPO status, waxing and waining oral intake. At this time glucose is impacted by:  Heart Failure with reduced EF 25-30%, LVAD implant and RVAD  Vasopressor use: weaning  TANNER on CVVHD  Enteral Feeds    This admission, he required low basal doses but high bolus doses with meals to offset pre-prandial hyperglycemia. An insulin gtt has been used for glycemic control post-operatively and insulin rates have been erratic over the last 2 weeks. His insulin rates have been stable over the last 4 days and reasonable to transition off to SQ basal/correction insulin. BG target is now 140-180mg/dl. Action Plan    NPH 20 units Q12. Please give the first dose and stop the insulin gtt 2 h later. If feeds are held- please hold dose. Humalog at normal sensitivity Q4h            Initial Presentation   Roselia Cee is a 71 y.o. male who presented to the ED 1/26/23 with lower extremity swelling and difficulty breathing. He had a prolonged hospital admission from 12/22/22-1/19/23 for acute on chronic systolic HF and LVAD work-up. He was discharged with home inotrope. LAB: , GFR 58, Trop 2003, BNP 4524  CXR: New diffuse bilateral increased interstitial markings, partially obscuring bilateral pulmonary nodules.      HX:   Past Medical History:   Diagnosis Date    CAD (coronary artery disease) 11/10/2016    NSTEMI & 2 stents    Deafness 10/28/2012    DM (diabetes mellitus) ([facility]     Elevated cholesterol     Hypertension     [facility]non-ST elevated [facility] 11/10/2016        INITIAL DX:   CHF (congestive heart failure) ([facility] [I50.9]     Current Treatment     TX: Milrinone, Amio. Specialty consultations: [facility], Cardiology, EP, GI     Hospital Course   Clinical progress has been complicated by:    9/79: Admission. Rapid response for SVT- Given adenosine x2, amio bolus/gtt  1/27: Advanced HFC consult. EP consult ordered. Intolerance of inotropic support. Cardiology consult. NSTEMI, heparin gtt started. Seen by EP: Continue amio. 1/28: Febrile: CT chest 1/28 shows diffuse focal opacities concerning for pneumonia. Obtain blood cultures, UA. Pathology report 1/18/23: well differentiated neuroendocrine tumor grade 1. EGD: Patchy erythema gastric body, biopsies done. 6 mm sessile polyp gastric body biopsied  1/30: Oncology consult: Recommend multiphase CT of the abdomen and pelvis to evaluate for metastatic spread. Tachycardia and SOB, BiPAP overnight. 2/3: Accepted for VAD implant if renal fx improves per [facility]. CCU Transfer and swan placed  2/4: PRBC transfusion   2/6: With increased dyspnea. Doubtamine started  2/15: LVAD Implant  2/17: Extubated. CRRT started. ECHO with persistent RV failure. OR for RV impella. Remained Intubated post-op  2/20: UA with large leuk esterase and 2+ bacteria.  Urine Cx with yeast  2/21: PRBC transfusion, PLT transfusion    2/22: Bronch   Diabetes History   Type 2 Diabetes  Ambulatory BG management provided by: PCP Mikhail Cowan MD    Diabetes-related Medical History  Acute complications  Acute hyperglycemia  Neurological complications  Peripheral neuropathy  Microvascular disease  Nephropathy  Macrovascular disease  CAD  Other associated conditions CHF     Diabetes Medication History  Key Antihyperglycemic Medications        Diabetes Medication History  Key Antihyperglycemic Medications               metFORMIN (GLUCOPHAGE) 500 mg tablet (Taking/) Take 1 Tablet by mouth two (2) times daily (with meals) for 30 days. glipiZIDE (GLUCOTROL) 5 mg tablet (Taking) Take 1 tablet by mouth twice daily    Januvia 50 mg tablet (Taking) Take 1 tablet by mouth once daily             Diabetes self-management practices:   Eating pattern                Eats 3 small meals daily  [x]         Breakfast                         2 Eggs, Coffee  [x]         Lunch                               Sieper  [x]         Dinner                              \"[employer]\" Bread, rice, lentils   [x]         Bedtime                           [place]ie  [x]         Snacks                              Afternoon snack  [x]         Beverages                        Water, Coffee  Physical activity pattern                Sedentary   Monitoring pattern  Discharged last week with a [employer]ynn CGM and serum glucometer  7 day average Ba-9a: 129-164  9a-12: 243  Noon to 9p: 198-238  1 low BG in the last week overnight    Taking medications pattern  [x]         Consistent administration  [x]         Affordable  Social determinants of health impacting diabetes self-management practices   Concerned that you need to know more about how to stay healthy with diabetes  Overall evaluation:                 [x]         Achieving A1c target with drug therapy & self-care practices    Subjective     Objective   Physical exam  General Underweight [place] [facility] male in critical condition in CVICU. Intubated. LVAD. RV Impella, CVVHD  Neuro  Sedated   Vital Signs Visit Vitals  BP (!) 87/55   Pulse 78   Temp 96.8 °F (36 °C)   Resp 10   Ht 5' 6\" (1.676 m)   Wt 62.2 kg (137 lb 2 oz)   SpO2 100%   BMI 22.13 kg/m²     Skin  Warm and dry. No acanthosis noted along neckline. Sternal surgical incision. Chest tubes. LVAD  Heart   Regular rate and rhythm.  No murmurs, rubs or gallops  Lungs  Clear to auscultation without rales or rhonchi  Extremities No foot wounds        Laboratory  Recent Labs     02/27/23  0312 02/27/23  0311 02/26/23  0503 02/25/23  1727 02/25/23  0409   GLU  --  158* 159* 142* 150*   AGAP  --  6 8 8 6   WBC 8.9  --  8.1  --  6.7   CREA  --  1.63* 1.56* 1.58* 1.64*   AST  --  96* 80*  --  59*   ALT  --  31 23  --  14         Factors impacting BG management  Factor Dose Comments   Nutrition:  Standard meals     Enteral feeds  Vital 1.5 @ 35 ml/hr  153g CHO/24h      Heart Failure/Cardiogenic Shock HeartMate 3 LVAD  Dobutamine: Off  Epinephrine   Norepi- weaning  Failed RVAD wean    Lactic Acidosis Resolved    Septic Shock UTI/PNA  Urine Cx 2/20 with yeast  IV antibiotics   Fevers    Other:   Kidney function TANNER on CKD:   Current GFR 42  CVVHD    Shock Liver LFTs- improving      Blood glucose pattern    Significant diabetes-related events over the past 24-72 hours  A1C 7.7% 1/11/23  Pre-op: Basal: Lantus 6 units daily and Bolus: 10 units Humalog/meal  Insulin gtt:   2/15: 2.8 units  2/16: 32.8 units  2/17: 23.1 units  2/18: 19.9 units  2/19: 8.7 units  2/20: 53.6 units  2/21: 99 units   2/22: 94.9 units  2/23: 69.1 units  2/24: 46.2  2/25: 57.5 units  2/26: 48 units  2/27: 42 units since MN    Gtts: Epi (3)  Possible ramp test in OR tomorrow    Assessment and Nursing Intervention   Nursing Diagnosis Risk for unstable blood glucose pattern   Nursing Intervention Domain 0562 Decision-making Support   Nursing Interventions Examined current inpatient diabetes/blood glucose control   Explored factors facilitating and impeding inpatient management  Explored corrective strategies with patient and responsible inpatient provider   Informed patient of rational for insulin strategy while hospitalized     Billing Code(s)   42863    Before making these care recommendations, I personally reviewed the hospitalization record, including notes, laboratory & diagnostic data and current medications, and examined the patient at the bedside (circumstances permitting) before determining care. More than fifty (50) percent of the time was spent in patient counseling and/or care coordination.   Total minutes: 25    SLICK Rhodes  Diabetes Clinical Nurse Specialist  [facility]  Access via [employer]

## 2023-02-27 NOTE — PROGRESS NOTES
Transitions of Care Plan  RUR: 30% - high  Clinical Update: LVAD; multiple pressor support; CRRT; intubated; RVAD  Consults: Multiple  Baseline: ambulates w RW; resides w wife  Barrier(s) to Discharge: medical  Disposition:   Home Health: [address]  Estimated Discharge Date: 2+ days    Clinical update per chart review and/or patient discussed during Interdisciplinary Rounds:    Patient is not medically stable for discharge due to ongoing medical needs. CM continues to follow treatment plan for medical progress and disposition needs.     Disposition:  Pending medical progress    Dylan Lima, MPH  Care Manager [facility]  Available via [address] [place] [address] or

## 2023-02-27 NOTE — DIALYSIS
CRRT / [phone]    Orders   Mode: CVVHD   Blood Flow Rate: 250 ml/min   Prismasol Dose: 1750 ml/hr Dialysate   Prismasol Concentrate: 4K/2.5Ca   Blood Warmer Temp: 37*C   Net Fluid Removal: 0 ml/hr     Metrics   BP: 87/55   HR: 81   Access Pressure: -89   Filter Pressure: 173   Return Pressure: 80   TMP: 25   Pressure Drop: 58     Access   Type & Location: [facility] temporary CVC   Comments: Dressing CDI, lines reversed                  Labs   HBsAg (Antigen) / date: Negative 2/18/23                                              HBsAb (Antibody) / date: Susceptible 2/18/23   Source: [facility]     Safety:   Time Out Done:   (Time) 0830   Consent obtained/signed: Verified   Education: Pt intubated/sedated   Primary Nurse Rpt Pre: Cindy Sibley RN   Primary Nurse Rpt Post: Cindy Sibley RN     Comments / Plan:      [id number] filter running well with no indication for change at this time. Consents, patient, code status, labs and orders verified. [facility] temporary CVC, dressing CDI with bio-patch. No signs of redness, drainage, or infection visualized. Lines REVERSED, visible and connections secure with blood warmer to return line at 37*C. Education & pre/post report with primary RN.

## 2023-02-27 NOTE — PROGRESS NOTES
[facility] in [place], [place]  Inpatient Progress Note      Patient name: Mary Beth Hilliard  Patient : 1950  Patient MRN: [id number]  Consulting MD: Lilliam Williamson MD  Date of service: 23    REASON FOR REFERRAL:  Management of LVAD     PLAN OF CARE:  70 y/o male with likely combined non-ischemic and ischemic cardiomyopathy, LVEF 25-30%, stage D, NYHA class IV now s/p HM3 LVAD as DT 2/15/23 by Dr. Júnior Stovall  C/b RV failure requiring Impella RP placed 23  C/b acute on chronic renal failure, requiring CRRT  C/b hepatic failure, TB 14.4 (peak 15.3)  C/b lactic acidosis, resolved  C/b persistent septic shock c/b vasodilation and high outputs, on multiple antibiotics, procalcitonin improving  Failed RVAD wean trials daily  Ongoing family updates  Patient was approved for LVAD implantation as destination therapy at [facility] 2/3/23; the following have been identified and d/w patient as relative concerns pertaining to LVAD candidacy: small body surface area, cachexia, BMI 18, malnutrition, frailty, advanced age, muscular deconditioning, PVD (fingertips), urinary retention requiring donnelly catheter, neuroendocrine tumor class 1 requiring treatment after LVAD, mild neurocognitive dysfunction, chronic kidney disease and hearing loss requiring hearing aid  Plan to address inotropic/pressor/sedation strategy to reduce vasodilation (outputs exceeding 8-9 liters) in order to aid with RVAD wean; meanwhile, partial improvement of sepsis/inflammation markers  Family meeting with AHF team for updates planned this week as we are approaching second week of intubation        RECOMMENDATIONS:  Continue Impella RP at P6, HM3 LVAD at 4600rpm  Goals today is to keep CVP 12-14  Enhance RV inotropic support with epi  Preference to use vasopressin > levo to keep MAP > 65  Continuous NICOMs; change patches QOD  Echo pending  No beta-blockers due to hypotension and RV conditioning prior to LVAD  Cannot tolerate ARB/ARNi due to hypotension and upcoming surgical procedure   Cannot tolerate spironolactone due to hyperkalemia  Cannot tolerate jardiance due to significant diuresis on smallest dose/contributed to IVVD  Previously discontinued corlanor due to SVT  Digoxin stopped d/t risk for toxicity with amio loading  Discontinued amio due to intermitted heart blocks under pacemaker  Nephrology consult appreciated, cont CRRT factor to 50 as tolerated  Keep K+ >4 and Mg >2  Transfuse to keep hgb > 7  Continue antibiotics, zosyn, vanc, flagyl and diflucan  Continue colchicine 0.6mg daily for possible pericarditis, check EKG   Hold ASA d/t thrombocytopenia- improving   Continue coumadin tonight, INR goal 2-3 and heparin gtt  HIT panel negative   Continue TF   Continue bowel regimen   Daily dressing changes to Drive line exit site  Pulmonary hygiene   Timing of extubation per intensivist- would cont SBT for now  D/w intensivist and bedside staff      INTERVAL HISTORY:  No issues overnight  MAPs 60s, HR 80s  CVP 12  Epi 3, levo off, precedex  Hgb 7.3  INR 1.9, aPTT 64  Cr 1.6 on CRRT  TB 14.4, peak 15.3  Lactic 1.6  Procalc 3.14 from 3.55 from 4.24     IMPRESSION:  Acute on chronic combined systolic/diastolic  heart failure  Stage D, NYHA class IV symptoms   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, LVEF 20% (by echo 1/2023) and 23% (by cMRI 1/3/23)  Cardiac MRI suggestive of ischemic cardiomyopathy  PYP equivocal  S/p HeartMate 3  LVAD-DT (2/15/23)  C/b RV failure requiring Impella RP (2/18/23)  C/b franco on CKD requiring CRRT  C/b liver dysfunction (ischemic vs congestive)  Coronary artery disease  CAD s/p CABG x 2: further disease best managed medically due to small vessel size   At risk of sudden cardiac death  Peripheral arterial disease  Bilateral hydronephrosis s/p andrzej  Cardiac risk factors:  HTN  HL  TRISTAN, STOP-BANG 4  DM2  CKD, stage 3  MOCA from 2/9 19/30, consistent with mild cognitive impairment  Hard of hearing  Gastric Neuroendocrine Tumor, elevated gastrin and chromogranin A  Septic shock, source pending           LIFE GOALS:  Lifestyle goals reviewed with the patient. Patient's personal goals include: having a few more years with family  Important upcoming milestones or family events: None at this time   The patient identifies the following as important for living well: being at home, not being SOB              CARDIAC IMAGING:   Echo 2/19/23    Left Ventricle: Severely reduced left ventricular systolic function with a visually estimated EF of 20 - 25%. Not well visualized. Left ventricle size is normal. Increased wall thickness. Unable to assess wall motion. Abnormal diastolic function. LVAD is present. LVAD inflow cannula was not well visualized. Right Ventricle: Not well visualized. Right ventricle is mildly dilated. Moderately reduced systolic function. TAPSE is abnormal.    Mitral Valve: Severe annular calcification at the anterior and posterior leaflets of the mitral valve. Mild regurgitation. Left Atrium: Left atrium is dilated. Right Atrium: Right atrium is dilated. Technical qualifiers: Echo study was technically difficult and technically difficult with poor endocardial visualization. Echo 1/27/23    Left Ventricle: EF by visual approximation is 20%. Left ventricle is mildly dilated. Mitral Valve: Moderately thickened leaflet, at the anterior and posterior leaflets. Moderately calcified leaflet. Moderate regurgitation. Left Atrium: Left atrium is moderately dilated. Technical qualifiers: Echo study was technically difficult with poor endocardial visualization. Contrast used: Definity. Limited study, not all structures viewed     Echo 1/9/23   Left Ventricle: Severely reduced left ventricular systolic function with a visually estimated EF of 25 - 30%. Left ventricle size is normal. Mildly increased wall thickness.      Echo 12/26/22    Left Ventricle: Severely reduced left ventricular systolic function with a visually estimated EF of 25 - 30%. Left ventricle size is normal. Normal wall thickness. There are regional wall motion abnormalities. Grade II diastolic dysfunction with increased LAP. Right Ventricle: Moderately reduced systolic function. TAPSE is abnormal. TAPSE is 1.1 cm. Aortic Valve: Mild stenosis of the aortic valve. AV peak gradient is 13 mmHg. AV peak velocity is 1.8 m/s. Mitral Valve: Not well visualized. Moderate annular calcification at the posterior leaflet of the mitral valve. Mild to moderate regurgitation. Tricuspid Valve: Mildly elevated RVSP. Left Atrium: Left atrium is moderately dilated. 12/8/22    Left Ventricle: Moderately reduced left ventricular systolic function with a visually estimated EF of 35 - 40%. Severe hypokinesis of the following segments: mid anteroseptal, apical anterior, apical septal, apical inferior and apical lateral. Severe hypokinesis of the apex. Mitral Valve: Severely thickened leaflet, at the anterior and posterior leaflets. Severely calcified leaflet, at the anterior and posterior leaflets. Mild annular calcification of the mitral valve. Moderate regurgitation. Left Atrium: Left atrium is mildly dilated. Contrast used: Definity. limited study     EKG 12/22/22 ST, Biatria enlargement, marked ST abnormality     C 12/6/22  1. Normal LVEDP  2. Severe native multivessel coronary artery disease  3. Patent LIMA to LAD and vein graft to distal RCA  4. Recurrent ISR in OM1 stent with now 60 to 70% restenosis  5. Recoil of left main and circumflex stent with now recurrent 40 to 50% stenosis. 6.  Progression of ostial left main disease now to about 60% stenosis  7. Progression of disease in jailed first marginal branch now with diffuse 90% stenosis  8.   High-grade stenosis in the mid to distal right potential femoral artery treated with 6 x 40 mm impact drug-coated balloon angioplasty to reduce the stenosis to less than 40%        HEMODYNAMICS:  RHC 23  PA 20/9, RA 3, PCWP 8, CI 1.8     CPEST too ill   6MW 300 feet       LVAD INTERROGATION:  Device interrogated in person  Device function normal, normal flow, no events  LVAD   Pump Speed (RPM): 4600  Pump Flow (LPM): 3.5  MAP: 80  PI (Pulsitility Index): 4.2  Power: 2.9   Test: No  Back Up  at Bedside & Labeled: Yes  Power Module Test: No  Driveline Site Care: No  Driveline Dressing: Clean, Dry, and Intact  Testing  Alarms Reviewed: Yes  Back up SC speed: 4600  Back up Low Speed Limit: 4200  Emergency Equipment with Patient?: Yes  Emergency procedures reviewed?: Yes  Drive line site inspected?: No  Drive line intergrity inspected?: Yes  Drive line dressing changed?: No    PHYSICAL EXAM:  Visit Vitals  BP (!) 87/55   Pulse 81   Temp 96.8 °F (36 °C)   Resp 15   Ht 5' 6\" (1.676 m)   Wt 137 lb 2 oz (62.2 kg)   SpO2 95%   BMI 22.13 kg/m²     Physical Exam  Vitals and nursing note reviewed. Constitutional:       Appearance: He is ill-appearing. Interventions: He is sedated and intubated. Cardiovascular:      Rate and Rhythm: Normal rate and regular rhythm. Comments: LVAD Humm noted on auscultation   Pulmonary:      Effort: Pulmonary effort is normal. No respiratory distress. He is intubated. Breath sounds: Rhonchi present. Comments: Coarse lung sounds  Abdominal:      General: Bowel sounds are decreased. There is no distension. Comments: Driveline exit site with dressing C/D/I   Musculoskeletal:      Right lower le+ Pitting Edema present. Left lower le+ Pitting Edema present. Comments: Left groin Impella in place, dressing C/D/I   Skin:     Capillary Refill: Capillary refill takes more than 3 seconds. Coloration: Skin is jaundiced.             REVIEW OF SYSTEMS:  Review of Systems   Unable to perform ROS: Intubated      PAST MEDICAL HISTORY:  Past Medical History:   Diagnosis Date    CAD (coronary artery disease) 11/10/2016    NSTEMI & 2 stents    Deafness 10/28/2012    DM (diabetes mellitus) ([facility] [address]     Elevated cholesterol     Hypertension     NSTEMI (non-ST elevated myocardial infarction) ([facility] [address] 11/10/2016       PAST SURGICAL HISTORY:  Past Surgical History:   Procedure Laterality Date    COLONOSCOPY N/A 6/28/2018    COLONOSCOPY performed by Reinaldo Cota MD at [address] [place] [address] ENDOSCOPY    COLONOSCOPY N/A 1/18/2023    COLONOSCOPY performed by Chris Robertson MD at [address] [place] [address] ENDOSCOPY    [address]  11/11/2016    2 stents       FAMILY HISTORY:  Family History   Problem Relation Age of Onset    Heart Disease Father     Heart Attack Father     Hypertension Mother     Elevated Lipids Brother     Elevated Lipids Brother     No Known Problems Sister     Elevated Lipids Brother     No Known Problems Son     No Known Problems Daughter     Anesth Problems Neg Hx        SOCIAL HISTORY:  Social History     Socioeconomic History    Marital status:    Tobacco Use    Smoking status: Never     Passive exposure: Never    Smokeless tobacco: Never   Vaping Use    Vaping Use: Never used   Substance and Sexual Activity    Alcohol use: Yes     Alcohol/week: 2.0 standard drinks     Types: 1 Cans of beer, 1 Shots of liquor per week     Comment: rarely    Drug use: No    Sexual activity: Yes     Social Determinants of Health     Financial Resource Strain: Medium Risk    Difficulty of Paying Living Expenses: Somewhat hard   Food Insecurity: Food Insecurity Present    Worried About Running Out of Food in the Last Year: Never true    Ran Out of Food in the Last Year: Often true       LABORATORY RESULTS:     Labs Latest Ref Rng & Units 2/27/2023 2/26/2023 2/25/2023 2/25/2023 2/24/2023 2/23/2023 2/23/2023   WBC 4.1 - 11.1 K/uL 8.9 8.1 - 6.7 9.6 - 9.4   RBC 4.10 - 5.70 M/uL 2.52(L) 2.69(L) - 2.61(L) 2.67(L) - 3.00(L)   Hemoglobin 12.1 - 17.0 g/dL 7. 3(L) 7.5(L) - 7. 3(L) 7. 6(L) - 8. 3(L)   Hematocrit 36.6 - 50.3 % 24. 2(L) 25. 3(L) - 23. 9(L) 23. 4(L) - 25. 8(L)   MCV 80.0 - 99.0 FL 96.0 94.1 - 91.6 87.6 - 86.0   Platelets 173 - 039 K/uL 166 134(L) - 109(L) 93(L) - 80(L)   Lymphocytes 12 - 49 % - 11(L) - 13 8(L) - 8(L)   Monocytes 5 - 13 % - 14(H) - 7 17(H) - 12   Eosinophils 0 - 7 % - 8(H) - 5 5 - 6   Basophils 0 - 1 % - 1 - 1 1 - 1   Albumin 3.5 - 5.0 g/dL 3.9 3.7 3. 3(L) 3. 3(L) 3.6 3. 2(L) 3.5   Calcium 8.5 - 10.1 MG/DL 9.3 8.7 8.6 8.7 8.6 9.1 9.0   Glucose 65 - 100 mg/dL 158(H) 159(H) 142(H) 150(H) 108(H) 174(H) 133(H)   BUN 6 - 20 MG/DL 22(H) 25(H) 23(H) 23(H) 22(H) 21(H) 21(H)   Creatinine 0.70 - 1.30 MG/DL 1.63(H) 1.56(H) 1.58(H) 1.64(H) 1.71(H) 1.94(H) 1.92(H)   Sodium 136 - 145 mmol/L 138 139 140 138 137 137 138   Potassium 3.5 - 5.1 mmol/L 4.7 4.5 4.4 4.5 4.2 4.0 3.9   TSH 0.36 - 3.74 uIU/mL - - - - - - -   PSA 0.01 - 4.0 ng/mL - - - - - - -   LDH 85 - 241 U/L 594(H) 586(H) - 566(H) 623(H) - 637(H)   Some recent data might be hidden     Lab Results   Component Value Date/Time    TSH 3.52 01/28/2023 05:26 AM    TSH 2.12 12/27/2022 02:36 PM    TSH 4.80 (H) 12/06/2022 03:53 AM    TSH 5.39 (H) 10/12/2022 09:10 AM    TSH 3.53 02/03/2022 11:47 AM    TSH 5.790 (H) 11/21/2019 04:45 PM    TSH 3.08 06/22/2018 01:53 PM    TSH 4.250 05/26/2015 09:43 AM       ALLERGY:  Allergies   Allergen Reactions    Ambien [Zolpidem] Other (comments)     Causes extreme confusion        CURRENT MEDICATIONS:    Current Facility-Administered Medications:     propofol (DIPRIVAN) 10 mg/mL infusion, 0-50 mcg/kg/min, IntraVENous, TITRATE, Colby Velásquez MD, Last Rate: 3.9 mL/hr at 02/27/23 0825, 10 mcg/kg/min at 02/27/23 0825    HYDROmorphone 30 mg/30 mL (1 mg/mL) infusion, 0.5-1 mg/hr, IntraVENous, CONTINUOUS, Eleonora Johnson MD, Last Rate: 1 mL/hr at 02/27/23 0806, 1 mg/hr at 02/27/23 0806    dextrose (D50W) injection syrg 25 g, 25 g, IntraVENous, PRN, Mounika Rodríguez MD, 9 mL at 02/25/23 1225    neomycin-polymyxin-dexamethasone (DEXACINE) 3.5 mg/g-10,000 unit/g-0.1 % ophthalmic ointment, , Both Eyes, BID, Aby Knight MD, Given at 02/27/23 5765    NOREPINephrine (LEVOPHED) 8 mg in 5% dextrose 250mL (32 mcg/mL) infusion, 0.5-16 mcg/min, IntraVENous, TITRATE, Oxana Johnson MD, Stopped at 02/27/23 0150    alteplase (CATHFLO) 3 mg in dextrose 5% 50 mL impella purge solution, 3 mg, Other, TITRATE, Johnny Orozco MD, Last Rate: 6 mL/hr at 02/27/23 0851, 3 mg at 02/27/23 0851    colchicine tablet 0.6 mg, 0.6 mg, Oral, DAILY, Lizette Linton B, NP, 0.6 mg at 02/27/23 0823    fluconazole (DIFLUCAN) 400mg/200 mL IVPB (premix), 400 mg, IntraVENous, Q24H, Lalo Lintonin B, NP, Last Rate: 100 mL/hr at 02/26/23 1046, 400 mg at 02/26/23 1046    vancomycin (VANCOCIN) 750 mg in 0.9% sodium chloride 250 mL ([id number]), 750 mg, IntraVENous, Q24H, Walker Aponte MD, Last Rate: 250 mL/hr at 02/26/23 1531, 750 mg at 02/26/23 1531    0.9% sodium chloride infusion 250 mL, 250 mL, IntraVENous, PRN, Lalo Lintonin B, NP    piperacillin-tazobactam (ZOSYN) 3.375 g in 0.9% sodium chloride (MBP/ADV) 100 mL MBP, 3.375 g, IntraVENous, Q8H, Walker Aponte MD, Last Rate: 25 mL/hr at 02/27/23 0544, 3.375 g at 02/27/23 0544    Vancomycin - pharmacy to dose, , Other, Rx Dosing/Monitoring, Walker Aponte MD    metroNIDAZOLE (FLAGYL) IVPB premix 500 mg, 500 mg, IntraVENous, Q12H, Martinez Hidden A, MD, Last Rate: 100 mL/hr at 02/27/23 0823, 500 mg at 02/27/23 0823    bicarbonate dialysis (PRISMASOL) BG K 4/Ca 2.5 5000 ml solution, , Extracorporeal, DIALYSIS CONTINUOUS, Jadiel Galvan MD, Last Rate: 1,700 mL/hr at 02/27/23 0906, New Bag at 02/27/23 0906    heparin (porcine) 1,000 unit/mL injection 1,700 Units, 1,700 Units, InterCATHeter, DIALYSIS PRN, 1,700 Units at 02/20/23 0040 **AND** heparin (porcine) 1,000 unit/mL injection 1,500 Units, 1,500 Units, InterCATHeter, DIALYSIS PRN, Sona Sanders, DO, 1,500 Units at 02/20/23 0037    balsam peru-castor oiL (VENELEX) ointment, , Topical, BID, FisMiroslava terrell MD, Given at 02/27/23 5005    acetaminophen (TYLENOL) solution 650 mg, 650 mg, Oral, Q4H PRN, Travon Mercado MD, 650 mg at 02/20/23 0401    acetaminophen (TYLENOL) suppository 650 mg, 650 mg, Rectal, Q4H PRN, Travon Mercado MD, 650 mg at 02/17/23 2013    HYDROmorphone (DILAUDID) injection 0.5 mg, 0.5 mg, IntraVENous, Q3H PRN, Leela MOLINA MD, 0.5 mg at 02/22/23 2225    HYDROmorphone (DILAUDID) injection 1 mg, 1 mg, IntraVENous, Q4H PRN, Leela MOLINA MD, 1 mg at 02/26/23 1135    heparin 25,000 units in D5W 250 ml infusion, 12-25 Units/kg/hr, IntraVENous, TITRATE, Khushbu Reeves MD, Last Rate: 6.8 mL/hr at 02/27/23 0805, 10 Units/kg/hr at 02/27/23 0805    albumin human 25% (BUMINATE) solution 12.5 g, 12.5 g, IntraVENous, Q6H, Khushbu Reeves MD, Last Rate: 60 mL/hr at 02/27/23 0020, 12.5 g at 02/27/23 0544    Warfarin - MD/NP dosing, , Other, Rx Dosing/Monitoring, Khushbu Reeves MD    [facility]) injection 0.5 mg, 0.5 mg, IntraVENous, Q4H PRN, Khushbu Reeves MD, 0.5 mg at 02/17/23 1431    insulin regular (MYXREDLIN, NOVOLIN, HUMULIN) 100 units/100 ml NS infusion (premix), 0-50 Units/hr, IntraVENous, TITRATE, Shaila Lizette B NP, Last Rate: 4 mL/hr at 02/27/23 0842, 4 Units/hr at 02/27/23 0842    glucose chewable tablet 16 g, 4 Tablet, Oral, PRN, Shaila, Lizette B, NP    glucagon (GLUCAGEN) injection 1 mg, 1 mg, IntraMUSCular, PRN, Shaila, Lizette B, NP    insulin lispro (HUMALOG) injection, , SubCUTAneous, TIDAC, Pito, Cathy, CNS    sodium chloride (NS) flush 5-40 mL, 5-40 mL, IntraVENous, Q8H, Shaila, Lizette B, NP, 10 mL at 02/27/23 0737    sodium chloride (NS) flush 5-40 mL, 5-40 mL, IntraVENous, PRN, Shaila, Lizette B, NP, 40 mL at 02/17/23 1818    naloxone (NARCAN) injection 0.4 mg, 0.4 mg, IntraVENous, PRN, Shaila, Lizette B, NP    ELECTROLYTE REPLACEMENT NOTE: Nurse to review Serum Potassium and Magnesuim levels and Initiate Electrolyte Replacement Protocol as needed, 1 Each, Other, PRN, Shaila, Lizette B, NP    dextrose 10 % infusion 0-250 mL, 0-250 mL, IntraVENous, PRN, Shaila, Lizette B, NP, Last Rate: 150 mL/hr at 02/24/23 0220, 75 mL at 02/24/23 0220    acetaminophen (TYLENOL) tablet 650 mg, 650 mg, Oral, Q6H PRN, Shaila, Lizette B, NP, 650 mg at 02/17/23 0200    ondansetron (ZOFRAN) injection 4 mg, 4 mg, IntraVENous, Q4H PRN, Shaila, Lizette B, NP    albuterol-ipratropium (DUO-NEB) 2.5 MG-0.5 MG/3 ML, 3 mL, Nebulization, Q6H PRN, Shaila, Lizette B, NP    [Held by provider] aspirin chewable tablet 81 mg, 81 mg, Oral, DAILY, Shaila, Lizette B, NP, 81 mg at 02/17/23 0942    senna-docusate (PERICOLACE) 8.6-50 mg per tablet 1 Tablet, 1 Tablet, Oral, DAILY, Shaila, Lizette B, NP, 1 Tablet at 02/24/23 1225    polyethylene glycol (MIRALAX) packet 17 g, 17 g, Oral, DAILY, Shaila, Lizette B, NP, 17 g at 02/23/23 0911    bisacodyL (DULCOLAX) suppository 10 mg, 10 mg, Rectal, DAILY PRN, Shaila, Lizette B, NP, 10 mg at 02/22/23 0839    0.45% sodium chloride infusion, 10 mL/hr, IntraVENous, CONTINUOUS, Jerry Dixon MD, Last Rate: 10 mL/hr at 02/27/23 0804, 10 mL/hr at 02/27/23 0804    DOBUTamine (DOBUTREX) 500 mg/250 mL (2,000 mcg/mL) infusion, 0-10 mcg/kg/min, IntraVENous, TITRATE, Nessa Johnson MD, Stopped at 02/23/23 1044    dexmedeTOMidine in 0.9 % NaCl (PRECEDEX) 400 mcg/100 mL (4 mcg/mL) infusion soln, 0.1-1.5 mcg/kg/hr, IntraVENous, TITRATE, Nessa Johnson MD, Last Rate: 18.4 mL/hr at 02/27/23 0907, 1.3 mcg/kg/hr at 02/27/23 0907    0.9% sodium chloride infusion, 9 mL/hr, IntraVENous, CONTINUOUS, Nessa Johnson MD, Last Rate: 14 mL/hr at 02/27/23 0804, 14 mL/hr at 02/27/23 0804    EPINEPHrine (ADRENALIN) 10 mg in 0.9% sodium chloride 250 mL infusion, 0-10 mcg/min, IntraVENous, TITRATE, Lizette Linton NP, Last Rate: 4.5 mL/hr at 02/27/23 0806, 3 mcg/min at 02/27/23 3536    PATIENT CARE TEAM:  Patient Care Team:  Tigist Quintanilla MD as PCP - General (Family Medicine)  Tigist Quintanilla MD as PCP - [facility] Empaneled Provider  Shantell Burton MD (Cardiovascular Disease Physician)  Eliseo Collins MD (Gastroenterology)  Nicole Medina MD (Cardiothoracic Surgery)  Vira Mueller MD (Cardiovascular Disease Physician)  Sarthak Hammer MD (Nephrology)  Adam Ibarra MD (Pulmonary Disease)  Tom Rubin MD ([address] [place] [address] Vascular Surgery)     Thank you for allowing me to participate in this patient's care.     Lesvia Botello MD   [address] [place] [address], [address]  Phone: [phone]    Critical Care Time: 45 minutes  Critically ill

## 2023-02-27 NOTE — PROGRESS NOTES
Critical Care Note      Cardiac surgery was called for the evaluation and management of: Stage D heart failure, cardiogenic shock      The critical nature of the patient's condition includes the following: the patient is at imminent risk of clinical deterioration and death      Critical care diagnoses that are being treated:     Cardiogenic shock   RV failure     Cardiogenic Shock   Remains intubated and sedated  Remains off  and Levo  Lactic acid 1.6  Creatinine 1.6  LFTs 90's  NT pro-BNP 10K     RV failure  Continues on RVAD support  Continues on Epi 3  10 mmHg pressure drop with RP wean   Considering ramp test in OR tomorrow under KIARRA     Renal failure   Continues on CVVH  Holding factor due to hypotension                Intake/Output Summary (Last 24 hours) at 2023 1324  Last data filed at 2023 1200  Gross per 24 hour   Intake 3533 ml   Output 3415 ml   Net 118 ml      Visit Vitals  BP (!) 87/55   Pulse 78   Temp 96.8 °F (36 °C)   Resp 10   Ht 5' 6\" (1.676 m)   Wt 137 lb 2 oz (62.2 kg)   SpO2 100%   BMI 22.13 kg/m²      CXR Results  (Last 48 hours)                 23 0437  XR CHEST PORT Final result    Impression:  1. The life-support lines and tubes remain in satisfactory position. 2. The pulmonary edema pattern has decreased. Narrative:  EXAM:  XR CHEST PORT       INDICATION: Status post ventricular assist device, intubated       COMPARISON: 2023       TECHNIQUE: Semiupright portable chest AP view at 0426       FINDINGS: The ET tube is in satisfactory position. NG tube courses into the   stomach. Esophageal temperature probe is noted. LVAD is noted. The Impella   remains in place projecting over the region of the left pulmonary artery. Right IJ catheter overlies the SVC. Mediastinal pleural drains are noted. The lungs demonstrate decrease in the pulmonary edema pattern there is mild   perihilar edema. No pneumothorax. Small pleural effusions are suspected. 02/26/23 0516  XR CHEST PORT Final result    Impression:  No significant interval change. Narrative:  EXAM: XR CHEST PORT       DATE: 2/26/2023 5:16 AM       INDICATION: s/p VAD intubated, impella       COMPARISON: 2/25/2023       TECHNIQUE: A portable AP radiograph of the chest was obtained. FINDINGS:    Stable endotracheal tube, bilateral IJ approach central venous catheters,   ventricular assist device, and Impella device. NG tube in place. Stable   cardiomegaly moderate pulmonary edema. Unchanged bilateral perihilar and   bibasilar airspace opacities. No large pleural effusion. No pneumothorax. LVAD   Pump Speed (RPM): 4600  Pump Flow (LPM): 3.1  MAP: 80  PI (Pulsitility Index): 6.6  Power: 2.9   Test: Yes  Back Up  at Bedside & Labeled: Yes  Power Module Test: Yes  Driveline Site Care: Yes  Driveline Dressing: Changed per order  Testing  Alarms Reviewed: Yes  Back up SC speed: 4600  Back up Low Speed Limit: 4200  Emergency Equipment with Patient?: Yes  Emergency procedures reviewed?: Yes  Drive line site inspected?: Yes  Drive line intergrity inspected?: Yes  Drive line dressing changed?: Yes       I personally spent the above critical care time. This is time spent at this critically ill patient's bedside / unit / floor actively involved in patient care as well as the coordination of care. This does not include any procedural time which has been billed separately. This does not include any NP/PA patient care time. I had a face to face encounter with the patient, reviewed and interpreted patient data including clinical events, labs, images, vital signs, I/O's, and examined patient. I have discussed the case and the plan and management of the patient's care with the consulting services and bedside nurses.        This patient has a high probability of imminent, clinically significant deterioration, which requires the highest level of preparedness to intervene urgently. I participated in the decision-making and personally managed or directed the management of life and organ supporting interventions to treat or prevent imminent deterioration. This patient has a critical illness or injury that is acutely impairing one or more vital organ systems such that there is a high probability of imminent or life threatening deterioration in the patient's condition. This patient requires high complexity medical decision making to assess, manipulate, and support vital organ system failure. After stabilization, this patient still requires management to prevent further life / organ threatening deterioration.

## 2023-02-27 NOTE — PROGRESS NOTES
Bedside and Verbal shift change report given to 1906 Darwin Murphy (oncoming nurse) by Stanton Teague (offgoing nurse). Report included the following information SBAR and Recent Results. 0500 Rectal tube removed  due to no output and formed stool passing around tube. Shift summary: On and off of low dose Norepi to maintain MAP >65. Ending shift on the following drips: Propofol 10, Epi 3, Dilaudid 1, Precedex 0.5. Net fluid removal of 0 overnight on CRRT. Bedside and Verbal shift change report given to Diana Verduzco (oncoming nurse) by 1906 Darwin Murphy (offgoing nurse). Report included the following information SBAR, Recent Results, and Cardiac Rhythm SR w/1st degree AVB .

## 2023-02-27 NOTE — WOUND CARE
WOCN Note:      Re-consult for new area on left heel      Admitted on 12/22/22 for sepsis, cardiogenic shock, respiratory failure. History of MI, CABG x3, DM, CHF. Assessment:   Intubated, sedated, on pressors; chest tube; CRRT post LVAD  2/15 and Impella 2/18   Has a donnelly catheter in place. Resting on NARENDRA mattress with heels offloaded by pillows. Sacrum intact without erythema  Medial buttocks with natural hyperchromia. Gluteal cleft with light blanching erythema. 1. POA Unstageable Pressure Injury    Dry, stable eschar, periwound with erythema      No purulence noted  Tx: Painted the heel with betadine    2. Left Heel Deep Tissue Injury    Blood-filled bullae with erythema surrounding   Tx: Painted the heel with betadine. Wound Recommendations:    Sacrum: Apply Balsam peru-castor oil (Venelex) daily and cover with sacral foam   Bilateral heels: paint with betadine daily and allow to air dry; offload heels at all times. DO NOT apply Venelex to heels. PI Prevention:  Turn/reposition approximately every 2 hours  Offload heels with heels hanging off end of pillow at all times while in bed. Sacral Foam dressing: lift to assess regularly; change as needed. Discontinue if incontinence is frequently soiling dressing. Low Air Loss mattress: Use only flat sheet and one incontinence pad.       Discussed assessment and plan with RN and Dr. Anay Alba      Transition of Care: Plan to follow weekly and as needed while admitted to hospital.

## 2023-02-27 NOTE — PROGRESS NOTES
Pharmacist Note - Vancomycin Dosing  Therapy day 8  Indication: Sepsis/VAP  Current regimen: 750 mg q24h    Recent Labs     02/27/23  0312 02/27/23  0311 02/26/23  0503 02/25/23  1727 02/25/23  0409   WBC 8.9  --  8.1  --  6.7   CREA  --  1.63* 1.56* 1.58* 1.64*   BUN  --  22* 25* 23* 23*       A random vancomycin level of 13.5 mcg/mL was obtained. Goal target range Trough 10-15 mcg/mL      Plan: Continue current regimen. Pharmacy will continue to monitor this patient daily for changes in clinical status and renal function.     Luis Angel Mg, ThomasD

## 2023-02-28 NOTE — DIALYSIS
CR[facility] / [phone]    Orders   Mode: CVVHD restarted at 2200. Blood Flow Rate: 250 ml/min   Prismasol Dose: 1750 ml/hr Dialysate   Prismasol Concentrate: 2K/3.5ca   Blood Warmer Temp: 37*C   Net Fluid Removal: 0 ml/hr     Metrics   BP: 100/55   HR: 74   Access Pressure: -44   Filter Pressure: 136   Return Pressure: 66   TMP: 15   Pressure Drop: 44     Access   Type & Location: LIJ temporary CVC   Comments: Dressing CDI, dated 02/24/23 LINES REVERSED            Labs   HBsAg (Antigen) / date: Negative 2/18/23                                              HBsAb (Antibody) / date: Susceptible 2/18/23   Source: [facility]     Safety:   Time Out Done:   (Time) 2143   Consent obtained/signed: Verified   Education: Pt intubated/sedated   Primary Nurse Rpt Pre: Jorge Lima RN    Primary Nurse Rpt Post: Jorge iLma RN      Comments / Plan:      Patients labs, code status, consent and orders verified. All possible blood returned, Each dialysis catheter limb disinfected per p&p, blood returned per p&p, each dialysis hub disinfected per p&p. [id number] filter changed due to reaching max filter, new filter primed and tested with one liter normal saline with auto effluent cartridge dated 02/27/23. LIJ temporary CVC, dressing CDI. No signs of redness, drainage, or infection visualized. Lines REVERSED, visible and connections secure with blood warmer to return line at 37*C. Education & pre/post report with primary RN.

## 2023-02-28 NOTE — PROGRESS NOTES
[facility]    ICU TEAM Progress Note    Name: Leandra Carver   : 1950   MRN: [id number]   Date: 2023      Assessment and Plan:     Briefly, Pt is 71 y.o w HFrEF who underwent LVAD placement on 2/15. Post op course complicated by TANNER requiring CRRRT as well as RV failure requiring Impella RP. ICU Problems:    Acute on chronic HFrEF (20%) NYHA IIIb/IV (D) on home inotropic support, life vest  TANNER on CKD3  CHB post op  Aflutter w/ RVR  Acute on chronic hypoxemic respiratory failure  CAP  CAD  Gastric neuroendocrine tumor  Hx of LUE DVT  DM  PAD  TRISTAN  BPH w/ urinary retention requiring chronic donnelly      ICU Checklist       F - Feeding:  Yes TF  A - Analgesia: None  S - Sedation: Propofol and Precedex  T - DVT Prophylaxis: Heparin   H - Head of Bed: > 30 Degrees  U - Ulcer Prophylaxis: Protonix (pantoprazole)   G - Glycemic Control: Insulin  S - Spontaneous Breathing Trial: Pending  B - Bowel Regimen: MiraLax  I - Indwelling Catheter:   Tubes: ETT  Lines: Peripheral IV, Arterial Line, and Central Line  Drains: None      N- Sedated  Resp - on vent will wean  CV - LVAD. RV failure with impella RP at P6. Wean today with KIARRA. Ongoing inotropic support  GI: TF  Renal: Renal failure on CRRT  Heme-  heparin gtt. Starting coumadin. 1 prbc today  ID - Septic shock.   On Fluc, flagyl, zosyn, and vanco      POD:  10 Days Post-Op    S/P:   Procedure(s):  LEFT GROIN RP IMPELLA INSERTION, KIARRA BY DR Jose Luis Orona    Active Problem List:     Problem List  Date Reviewed: 2023            Codes Class    Dyslipidemia, goal LDL below 79 ICD-10-CM: E78.5  ICD-9-CM: 272.4         PAD (peripheral artery disease) ([facility] [address] ICD-10-CM: I73.9  ICD-9-CM: 096.0         Systolic CHF, acute on chronic (HCC) ICD-10-CM: I50.23  ICD-9-CM: 428.23, 428.0         Receiving inotropic medication ICD-10-CM: Z79.899  ICD-9-CM: V49.89         CHF (congestive heart failure) ([facility]) ICD-10-CM: I50.9  ICD-9-CM: 428.0         CKD stage 3 due to type 2 diabetes mellitus (HCC) ICD-10-CM: E11.22, N18.30  ICD-9-CM: 250.40, 585.3         S/P CABG x 3 ICD-10-CM: Z95.1  ICD-9-CM: V45.81     Overview Signed 7/9/2018 11:20 AM by SCAR Chowdary     Coronary Artery Bypass Grafting x 3, LIMA to LAD, RSVG to OM, RSVG to RCA  Right GSV EVH             Coronary artery disease involving native coronary artery of native heart without angina pectoris ICD-10-CM: I25.10  ICD-9-CM: 414.01         Hypertension, essential ICD-10-CM: I10  ICD-9-CM: 401.9         Colon cancer screening ICD-10-CM: Z12.11  ICD-9-CM: V76.51         Nonrheumatic aortic valve stenosis ICD-10-CM: I35.0  ICD-9-CM: 424.1         Bruit of right carotid artery ICD-10-CM: R09.89  ICD-9-CM: 785. 9         Type 2 diabetes mellitus with hyperglycemia (HCC) ICD-10-CM: E11.65  ICD-9-CM: 250.00         Deafness ICD-10-CM: H91.90  ICD-9-CM: 389.9         Cramp of limb ICD-10-CM: R25.2  ICD-9-CM: 729.82            Past Medical History:      has a past medical history of CAD (coronary artery disease) (11/10/2016), Deafness (10/28/2012), DM (diabetes mellitus) ([facility] Elevated cholesterol, Hypertension, and NSTEMI (non-ST elevated myocardial infarction) ([facility] (11/10/2016). Past Surgical History:      has a past surgical history that includes hx appendectomy; pr unlisted procedure cardiac surgery (11/11/2016); colonoscopy (N/A, 6/28/2018); and colonoscopy (N/A, 1/18/2023). Home Medications:     Prior to Admission medications    Medication Sig Start Date End Date Taking? Authorizing Provider   polyethylene glycol (Miralax) 17 gram/dose powder Take 17 g by mouth daily as needed for Constipation. Yes Provider, Historical   furosemide (LASIX) 20 mg tablet Take 1 Tablet by mouth daily as needed (for weight greater than 120 lb by 2-3 lb or shortness of breath).  1/23/23  Yes Paramjit Linton, JACOB   glipiZIDE (GLUCOTROL) 5 mg tablet Take 1 tablet by mouth twice daily 12/2/22  Yes Elisa Greenwood MD ipratropium (ATROVENT) 21 mcg (0.03 %) nasal spray 2 Sprays by Both Nostrils route every twelve (12) hours. 11/21/22  Yes Mikhail Cowan MD   ergocalciferol (ERGOCALCIFEROL) 1,250 mcg (50,000 unit) capsule Take 1 Capsule by mouth every seven (7) days. Patient taking differently: Take 50,000 Units by mouth every seven (7) days. Mondays 10/14/22  Yes Mikhail Cowan MD   Januvia 50 mg tablet Take 1 tablet by mouth once daily 9/23/22  Yes Mikhail Cowan MD   rosuvastatin (CRESTOR) 20 mg tablet Take 1 Tablet by mouth nightly. 2/28/22  Yes Tracey Dias MD   aspirin delayed-release 81 mg tablet Take 81 mg by mouth daily. Yes Provider, Historical   zolpidem (AMBIEN) 5 mg tablet Take 1 Tablet by mouth nightly as needed for Sleep. Max Daily Amount: 5 mg. 1/24/23   Mikhail Cowan MD   nitroglycerin (NITROSTAT) 0.4 mg SL tablet 1 Tablet by SubLINGual route every five (5) minutes as needed for Chest Pain. Up to 3 doses. 11/16/22   Nael Mullins MD       Allergies/Social/Family History: Allergies   Allergen Reactions    Ambien [Zolpidem] Other (comments)     Causes extreme confusion      Social History     Tobacco Use    Smoking status: Never     Passive exposure: Never    Smokeless tobacco: Never   Substance Use Topics    Alcohol use:  Yes     Alcohol/week: 2.0 standard drinks     Types: 1 Cans of beer, 1 Shots of liquor per week     Comment: rarely      Family History   Problem Relation Age of Onset    Heart Disease Father     Heart Attack Father     Hypertension Mother     Elevated Lipids Brother     Elevated Lipids Brother     No Known Problems Sister     Elevated Lipids Brother     No Known Problems Son     No Known Problems Daughter     Anesth Problems Neg Hx          Objective:   Vital Signs:  Visit Vitals  BP (!) 104/55   Pulse 78   Temp 100.4 °F (38 °C)   Resp 18   Ht 5' 6\" (1.676 m)   Wt 62.3 kg (137 lb 5.6 oz)   SpO2 100%   BMI 22.17 kg/m²    O2 Flow Rate (L/min): 20 l/min O2 Device: Endotracheal tube Temp (24hrs), Av.7 °F (37.1 °C), Min:95.9 °F (35.5 °C), Max:100.8 °F (38.2 °C)    CVP (mmHg): 7 mmHg (23 1500)      Intake/Output:     Intake/Output Summary (Last 24 hours) at 2023 1528  Last data filed at 2023 1459  Gross per 24 hour   Intake 3583.09 ml   Output 3018.7 ml   Net 564.39 ml       Physical Exam:    GEN:  ill appearing  Neuro:  Sedated  CV Reg  Resp Course  Abd soft   Extremities minimal edema    LABS AND  DATA: Personally reviewed  Recent Labs     23  0339 23  031   WBC 8.8 8.9   HGB 7.0* 7.3*   HCT 22.8* 24.2*    166     Recent Labs     23  0339 23  1835    138   K 4.0 4.0    106   CO2 25 26   BUN 25* 23*   CREA 1.64* 1.51*   * 105*   CA 9.7 9.3   MG 2.8* 3.0*   PHOS 2.8 3.3     Recent Labs     23  0339 23  0311   * 142*   TP 5.8* 6.1*   ALB 3.6 3.9   GLOB 2.2 2.2     Recent Labs     23  0615 23  0339 23  0311   INR  --  1.6* 1.9*   PTP  --  15.8* 18.9*   APTT 55.6*  --  64.9*      Recent Labs     23  0516   PHI 7.40   PCO2I 39.6   PO2I 142*     No results for input(s): CPK, CKMB, TROIQ, BNPP in the last 72 hours. Hemodynamics:   PAP: PAP Systolic: 38 (38/86/73 3278) CO: CO (l/min): 2.4 l/min (23 0700)   Wedge: PAOP (PCWP) (mmhg): 36 mmHg (23 1830) CI: CI (l/min/m2): 1.5 l/min/m2 (23 0700)   CVP:  CVP (mmHg): 7 mmHg (23 1500) SVR:       PVR:       Ventilator Settings:  Mode Rate Tidal Volume Pressure FiO2 PEEP   Assist control   350 ml  5 cm H2O 40 % 5 cm H20     Peak airway pressure: 25 cm H2O    Minute ventilation: 8.06 l/min        MEDS: Reviewed    Chest X-Ray:  CXR Results  (Last 48 hours)                 23 0431  XR CHEST PORT Final result    Impression:      Pulmonary edema unchanged. Increased airspace opacity in the right mid and lower   lung zone. Small bilateral pleural effusions.        Narrative:  EXAM:  XR CHEST PORT INDICATION: LVAD. Impella. COMPARISON: 2/27/2020       TECHNIQUE: Semiupright portable chest AP view       FINDINGS: Tubes and lines are stable. Median sternotomy changes. Borderline   heart size. Pulmonary edema. Increased airspace opacity in the right mid and lower lung zone. Small bilateral   pleural effusions. The visualized bones and upper abdomen are age-appropriate. 02/27/23 0437  XR CHEST PORT Final result    Impression:  1. The life-support lines and tubes remain in satisfactory position. 2. The pulmonary edema pattern has decreased. Narrative:  EXAM:  XR CHEST PORT       INDICATION: Status post ventricular assist device, intubated       COMPARISON: 2/26/2023       TECHNIQUE: Semiupright portable chest AP view at 0426       FINDINGS: The ET tube is in satisfactory position. NG tube courses into the   stomach. Esophageal temperature probe is noted. LVAD is noted. The Impella   remains in place projecting over the region of the left pulmonary artery. Right IJ catheter overlies the SVC. Mediastinal pleural drains are noted. The lungs demonstrate decrease in the pulmonary edema pattern there is mild   perihilar edema. No pneumothorax. Small pleural effusions are suspected. SPECIAL EQUIPMENT  None    DISPOSITION  Stay in ICU    CRITICAL CARE CONSULTANT NOTE  I had a face to face encounter with the patient, reviewed and interpreted patient data including clinical events, labs, images, vital signs, I/O's, and examined patient. I have discussed the case and the plan and management of the patient's care with the consulting services, the bedside nurses and the respiratory therapist.      NOTE OF PERSONAL INVOLVEMENT IN CARE   This patient has a high probability of imminent, clinically significant deterioration, which requires the highest level of preparedness to intervene urgently.  I participated in the decision-making and personally managed or directed the management of the following life and organ supporting interventions that required my frequent assessment to treat or prevent imminent deterioration. I personally spent 30 minutes of critical care time. This is time spent at this critically ill patient's bedside actively involved in patient care as well as the coordination of care and discussions with the patient's family. This does not include any procedural time which has been billed separately.       [address] [facility]

## 2023-02-28 NOTE — PROGRESS NOTES
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 Denia Parrish  C/b RV failure requiring Impella RP placed 23  C/b acute on chronic renal failure, requiring CRRT  C/b hepatic failure, TB 14.4 (peak 15.3)  C/b lactic acidosis, resolved  C/b persistent septic shock c/b vasodilation and high outputs, on multiple antibiotics, procalcitonin improving  Failed RVAD wean trials daily  Ongoing family updates  Patient was approved for LVAD implantation as destination therapy at [facility] 2/3/23; the following have been identified and d/w patient as relative concerns pertaining to LVAD candidacy: small body surface area, cachexia, BMI 18, malnutrition, frailty, advanced age, muscular deconditioning, PVD (fingertips), urinary retention requiring donnelly catheter, neuroendocrine tumor class 1 requiring treatment after LVAD, mild neurocognitive dysfunction, chronic kidney disease and hearing loss requiring hearing aid  Plan for bedside ramp study with KIARRA today +/- possible RVAD wean; meanwhile, partial improvement of sepsis/inflammation markers  Family meeting with AHF team for updates planned this week, as we are approaching second week of intubation, time to be decided after RVAD wean today        RECOMMENDATIONS:  Ramp wean with KIARRA today per Dr. Katharina Casiano RP at P6, HM3 LVAD at 4500rpm  Continue epi at 3  Continuous NICOMs; change patches every other day  Goals today is to keep CVP 12-14  No beta-blockers due to hypotension and RV conditioning prior to LVAD  Cannot tolerate ARB/ARNi due to hypotension and upcoming surgical procedure   Cannot tolerate spironolactone due to hyperkalemia  Cannot tolerate jardiance due to significant diuresis on smallest dose/contributed to IVVD  Previously discontinued corlanor due to SVT  Digoxin stopped d/t risk for toxicity with amio loading  Discontinued amio due to intermitted heart blocks under pacemaker  Nephrology consult appreciated, cont CRRT   Keep K+ >4 and Mg >2  Transfuse one unit pRBC today to keep hgb > 7  Continue antibiotics, zosyn, vanc, flagyl and diflucan  Continue colchicine 0.6mg daily for possible pericarditis  Hold ASA d/t thrombocytopenia- improving   Continue coumadin tonight 1mg, INR goal 2-3 and heparin gtt  HIT panel negative   Continue TF   Continue bowel regimen   Daily dressing changes to Drive line exit site  Pulmonary hygiene   Timing of extubation per intensivist- would cont SBT for now  D/w intensivist and bedside staff      INTERVAL HISTORY:  No issues overnight  MAPs 60-70s, HR 70-80s  CVP 7  Epi 3, levo off  Hgb 7.0  INR 1.6, aPTT 55.6  Cr 1.6 on CRRT  TB 13.7 from 14.4, peak 15.3  Lactic 1.6  Procalc 2.66 from 3.14 from 3.55 from 4.24     IMPRESSION:  Acute on chronic combined systolic/diastolic  heart failure  Stage D, NYHA class IV symptoms   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, LVEF 20% (by echo 1/2023) and 23% (by cMRI 1/3/23)  Cardiac MRI suggestive of ischemic cardiomyopathy  PYP equivocal  S/p HeartMate 3  LVAD-DT (2/15/23)  C/b RV failure requiring Impella RP (2/18/23)  C/b franco on CKD requiring CRRT  C/b liver dysfunction (ischemic vs congestive)  Coronary artery disease  CAD s/p CABG x 2: further disease best managed medically due to small vessel size   At risk of sudden cardiac death  Peripheral arterial disease  Bilateral hydronephrosis s/p andrzej  Cardiac risk factors:  HTN  HL  TRISTAN, STOP-BANG 4  DM2  CKD, stage 3  MOCA from 2/9 19/30, consistent with mild cognitive impairment  Hard of hearing  Gastric Neuroendocrine Tumor, elevated gastrin and chromogranin A  Septic shock, source pending           LIFE GOALS:  Lifestyle goals reviewed with the patient. Patient's personal goals include: having a few more years with family  Important upcoming milestones or family events: None at this time   The patient identifies the following as important for living well: being at home, not being SOB              CARDIAC IMAGING:   Echo 2/19/23    Left Ventricle: Severely reduced left ventricular systolic function with a visually estimated EF of 20 - 25%. Not well visualized. Left ventricle size is normal. Increased wall thickness. Unable to assess wall motion. Abnormal diastolic function. LVAD is present. LVAD inflow cannula was not well visualized. Right Ventricle: Not well visualized. Right ventricle is mildly dilated. Moderately reduced systolic function. TAPSE is abnormal.    Mitral Valve: Severe annular calcification at the anterior and posterior leaflets of the mitral valve. Mild regurgitation. Left Atrium: Left atrium is dilated. Right Atrium: Right atrium is dilated. Technical qualifiers: Echo study was technically difficult and technically difficult with poor endocardial visualization. Echo 1/27/23    Left Ventricle: EF by visual approximation is 20%. Left ventricle is mildly dilated. Mitral Valve: Moderately thickened leaflet, at the anterior and posterior leaflets. Moderately calcified leaflet. Moderate regurgitation. Left Atrium: Left atrium is moderately dilated. Technical qualifiers: Echo study was technically difficult with poor endocardial visualization. Contrast used: Definity. Limited study, not all structures viewed     Echo 1/9/23   Left Ventricle: Severely reduced left ventricular systolic function with a visually estimated EF of 25 - 30%. Left ventricle size is normal. Mildly increased wall thickness.      Echo 12/26/22    Left Ventricle: Severely reduced left ventricular systolic function with a visually estimated EF of 25 - 30%. Left ventricle size is normal. Normal wall thickness. There are regional wall motion abnormalities. Grade II diastolic dysfunction with increased LAP. Right Ventricle: Moderately reduced systolic function. TAPSE is abnormal. TAPSE is 1.1 cm. Aortic Valve: Mild stenosis of the aortic valve. AV peak gradient is 13 mmHg. AV peak velocity is 1.8 m/s. Mitral Valve: Not well visualized. Moderate annular calcification at the posterior leaflet of the mitral valve. Mild to moderate regurgitation. Tricuspid Valve: Mildly elevated RVSP. Left Atrium: Left atrium is moderately dilated. 12/8/22    Left Ventricle: Moderately reduced left ventricular systolic function with a visually estimated EF of 35 - 40%. Severe hypokinesis of the following segments: mid anteroseptal, apical anterior, apical septal, apical inferior and apical lateral. Severe hypokinesis of the apex. Mitral Valve: Severely thickened leaflet, at the anterior and posterior leaflets. Severely calcified leaflet, at the anterior and posterior leaflets. Mild annular calcification of the mitral valve. Moderate regurgitation. Left Atrium: Left atrium is mildly dilated. Contrast used: Definity. limited study     EKG 12/22/22 ST, Biatria enlargement, marked ST abnormality     [facility] 12/6/22  1. Normal LVEDP  2. Severe native multivessel coronary artery disease  3. Patent LIMA to LAD and vein graft to distal RCA  4. Recurrent ISR in OM1 stent with now 60 to 70% restenosis  5. Recoil of left main and circumflex stent with now recurrent 40 to 50% stenosis. 6.  Progression of ostial left main disease now to about 60% stenosis  7. Progression of disease in jailed first marginal branch now with diffuse 90% stenosis  8.   High-grade stenosis in the mid to distal right potential femoral artery treated with 6 x 40 mm impact drug-coated balloon angioplasty to reduce the stenosis to less than 40% HEMODYNAMICS:  RHC 23  PA 20/9, RA 3, PCWP 8, CI 1.8     CPEST too ill   6MW 300 feet       LVAD INTERROGATION:  Device interrogated in person  Device function normal, normal flow, no events  LVAD   Pump Speed (RPM): 4500  Pump Flow (LPM): 3  MAP: 70 (L brachial doppler)  PI (Pulsitility Index): 6.1  Power: 2.8   Test: No  Back Up  at Bedside & Labeled: Yes  Power Module Test: No  Driveline Site Care: No  Driveline Dressing: Breakthrough drainage  Testing  Alarms Reviewed: Yes  Back up SC speed: 4500  Back up Low Speed Limit: 4200  Emergency Equipment with Patient?: Yes  Emergency procedures reviewed?: Yes  Drive line site inspected?: No  Drive line intergrity inspected?: Yes  Drive line dressing changed?: No    PHYSICAL EXAM:  Visit Vitals  BP (!) 87/55   Pulse 75   Temp 98.6 °F (37 °C)   Resp 19   Ht 5' 6\" (1.676 m)   Wt 137 lb 5.6 oz (62.3 kg)   SpO2 98%   BMI 22.17 kg/m²     Physical Exam  Vitals and nursing note reviewed. Constitutional:       Appearance: He is ill-appearing. Interventions: He is sedated and intubated. Cardiovascular:      Rate and Rhythm: Normal rate and regular rhythm. Comments: LVAD Humm noted on auscultation   Pulmonary:      Effort: Pulmonary effort is normal. No respiratory distress. He is intubated. Breath sounds: Rhonchi present. Comments: Coarse lung sounds  Abdominal:      General: Bowel sounds are decreased. There is no distension. Comments: Driveline exit site with dressing C/D/I   Musculoskeletal:      Right lower le+ Pitting Edema present. Left lower le+ Pitting Edema present. Comments: Left groin Impella in place, dressing C/D/I   Skin:     Capillary Refill: Capillary refill takes more than 3 seconds. Coloration: Skin is jaundiced.             REVIEW OF SYSTEMS:  Review of Systems   Unable to perform ROS: Intubated      PAST MEDICAL HISTORY:  Past Medical History:   Diagnosis Date    CAD (coronary artery disease) 11/10/2016    NSTEMI & 2 stents    Deafness 10/28/2012    DM (diabetes mellitus) ([facility] [address]     Elevated cholesterol     Hypertension     NSTEMI (non-ST elevated myocardial infarction) ([facility] [address] 11/10/2016       PAST SURGICAL HISTORY:  Past Surgical History:   Procedure Laterality Date    COLONOSCOPY N/A 6/28/2018    COLONOSCOPY performed by Adry Montgomery MD at [facility] ENDOSCOPY    COLONOSCOPY N/A 1/18/2023    COLONOSCOPY performed by Wen Jones MD at [facility] ENDOSCOPY    [address]  11/11/2016    2 stents       FAMILY HISTORY:  Family History   Problem Relation Age of Onset    Heart Disease Father     Heart Attack Father     Hypertension Mother     Elevated Lipids Brother     Elevated Lipids Brother     No Known Problems Sister     Elevated Lipids Brother     No Known Problems Son     No Known Problems Daughter     Anesth Problems Neg Hx        SOCIAL HISTORY:  Social History     Socioeconomic History    Marital status:    Tobacco Use    Smoking status: Never     Passive exposure: Never    Smokeless tobacco: Never   Vaping Use    Vaping Use: Never used   Substance and Sexual Activity    Alcohol use: Yes     Alcohol/week: 2.0 standard drinks     Types: 1 Cans of beer, 1 Shots of liquor per week     Comment: rarely    Drug use: No    Sexual activity: Yes     Social Determinants of Health     Financial Resource Strain: Medium Risk    Difficulty of Paying Living Expenses: Somewhat hard   Food Insecurity: Food Insecurity Present    Worried About Running Out of Food in the Last Year: Never true    Ran Out of Food in the Last Year: Often true       LABORATORY RESULTS:     Labs Latest Ref Rng & Units 2/28/2023 2/27/2023 2/27/2023 2/27/2023 2/26/2023 2/25/2023 2/25/2023   WBC 4.1 - 11.1 K/uL 8.8 - 8.9 - 8.1 - 6.7   RBC 4.10 - 5.70 M/uL 2.31(L) - 2.52(L) - 2.69(L) - 2.61(L)   Hemoglobin 12.1 - 17.0 g/dL 7. 0(L) - 7. 3(L) - 7. 5(L) - 7. 3(L)   Hematocrit 36.6 - 50.3 % 22. 8(L) - 24. 2(L) - 25. 3(L) - 23. 9(L)   MCV 80.0 - 99.0 FL 98.7 - 96.0 - 94.1 - 91.6   Platelets 832 - 202 K/uL 171 - 166 - 134(L) - 109(L)   Lymphocytes 12 - 49 % 8(L) - - - 11(L) - 13   Monocytes 5 - 13 % 10 - - - 14(H) - 7   Eosinophils 0 - 7 % 4 - - - 8(H) - 5   Basophils 0 - 1 % 1 - - - 1 - 1   Albumin 3.5 - 5.0 g/dL 3.6 - - 3.9 3.7 3. 3(L) 3. 3(L)   Calcium 8.5 - 10.1 MG/DL 9.7 9.3 - 9.3 8.7 8.6 8.7   Glucose 65 - 100 mg/dL 174(H) 105(H) - 158(H) 159(H) 142(H) 150(H)   BUN 6 - 20 MG/DL 25(H) 23(H) - 22(H) 25(H) 23(H) 23(H)   Creatinine 0.70 - 1.30 MG/DL 1.64(H) 1.51(H) - 1.63(H) 1.56(H) 1.58(H) 1.64(H)   Sodium 136 - 145 mmol/L 136 138 - 138 139 140 138   Potassium 3.5 - 5.1 mmol/L 4.0 4.0 - 4.7 4.5 4.4 4.5   TSH 0.36 - 3.74 uIU/mL - - - - - - -   PSA 0.01 - 4.0 ng/mL - - - - - - -   LDH 85 - 241 U/L 582(H) - - 594(H) 586(H) - 566(H)   Some recent data might be hidden     Lab Results   Component Value Date/Time    TSH 3.52 01/28/2023 05:26 AM    TSH 2.12 12/27/2022 02:36 PM    TSH 4.80 (H) 12/06/2022 03:53 AM    TSH 5.39 (H) 10/12/2022 09:10 AM    TSH 3.53 02/03/2022 11:47 AM    TSH 5.790 (H) 11/21/2019 04:45 PM    TSH 3.08 06/22/2018 01:53 PM    TSH 4.250 05/26/2015 09:43 AM       ALLERGY:  Allergies   Allergen Reactions    Ambien [Zolpidem] Other (comments)     Causes extreme confusion        CURRENT MEDICATIONS:    Current Facility-Administered Medications:     0.9% sodium chloride infusion 250 mL, 250 mL, IntraVENous, PRN, Herb Mcintyre MD    epoetin heidy-epbx (RETACRIT) injection 10,000 Units, 10,000 Units, SubCUTAneous, Q TUE, THU & SAT, Abou-AssShilpa kaufman MD    heparin (porcine) 1,000 unit/mL injection 1,870 Units, 30 Units/kg, IntraVENous, ONCE, Juan Jose Johnson MD    dextrose 10 % infusion 0-250 mL, 0-250 mL, IntraVENous, PRN, Cathy Sheldon CNS    insulin NPH (NOVOLIN N, HUMULIN N) injection 20 Units, 20 Units, SubCUTAneous, Q12H, Cathy Sheldon CNS, 20 Units at 02/28/23 0824    insulin lispro (HUMALOG) injection, , SubCUTAneous, Q4H, Cathy Sheldon, CNS, 2 Units at 02/28/23 0833    bicarbonate dialysis (PRISMASOL) BG K 2/Ca 3.5 5000 ml solution, , Extracorporeal, DIALYSIS CONTINUOUS, Murrayu-SandeeiAngie MD, Last Rate: 1,700 mL/hr at 02/28/23 0924, New Bag at 02/28/23 0924    propofol (DIPRIVAN) 10 mg/mL infusion, 0-50 mcg/kg/min, IntraVENous, TITRATE, Blayne Armijo MD, Last Rate: 3.9 mL/hr at 02/27/23 0825, 10 mcg/kg/min at 02/27/23 0825    HYDROmorphone 30 mg/30 mL (1 mg/mL) infusion, 0.5-1 mg/hr, IntraVENous, CONTINUOUS, Reynaldo Johnson MD, Last Rate: 1 mL/hr at 02/28/23 0757, 1 mg/hr at 02/28/23 0757    dextrose (D50W) injection syrg 25 g, 25 g, IntraVENous, PRN, Kai Vazquez MD, 9 mL at 02/25/23 1225    neomycin-polymyxin-dexamethasone (Betty Silver) 3.5 mg/g-10,000 unit/g-0.1 % ophthalmic ointment, , Both Eyes, BID, Berny Guy MD, Given at 02/28/23 0825    NOREPINephrine (LEVOPHED) 8 mg in 5% dextrose 250mL (32 mcg/mL) infusion, 0.5-16 mcg/min, IntraVENous, TITRATE, Reynaldo Johnson MD, Stopped at 02/28/23 0600    alteplase (CATHFLO) 3 mg in dextrose 5% 50 mL impella purge solution, 3 mg, Other, TITRATE, Johnny Orozco MD, Last Rate: 6.7 mL/hr at 02/28/23 0758, Rate Verify at 02/28/23 0758    colchicine tablet 0.6 mg, 0.6 mg, Oral, DAILY, Shaila, Lizette B, NP, 0.6 mg at 02/28/23 0825    fluconazole (DIFLUCAN) 400mg/200 mL IVPB (premix), 400 mg, IntraVENous, Q24H, Lizette Linton, NP, Last Rate: 100 mL/hr at 02/28/23 1020, 400 mg at 02/28/23 1020    vancomycin (VANCOCIN) 750 mg in 0.9% sodium chloride 250 mL (Sgvy9Mpe), 750 mg, IntraVENous, Q24H, Walker Aponte MD, Last Rate: 250 mL/hr at 02/27/23 1512, 750 mg at 02/27/23 1512    0.9% sodium chloride infusion 250 mL, 250 mL, IntraVENous, PRN, Lizette Linton, NP    piperacillin-tazobactam (ZOSYN) 3.375 g in 0.9% sodium chloride (MBP/ADV) 100 mL MBP, 3.375 g, IntraVENous, Q8H, Sylvie Rushing MD, Last Rate: 25 mL/hr at 02/28/23 0500, 3.375 g at 02/28/23 0500    Vancomycin - pharmacy to dose, , Other, Rx Dosing/Monitoring, Adriana Foote MD    metroNIDAZOLE (FLAGYL) IVPB premix 500 mg, 500 mg, IntraVENous, Q12H, Promise MOLINA MD, Last Rate: 100 mL/hr at 02/28/23 0827, 500 mg at 02/28/23 0827    heparin (porcine) 1,000 unit/mL injection 1,700 Units, 1,700 Units, InterCATHeter, DIALYSIS PRN, 1,700 Units at 02/20/23 0040 **AND** heparin (porcine) 1,000 unit/mL injection 1,500 Units, 1,500 Units, InterCATHeter, DIALYSIS PRN, Calli Morales DO, 1,500 Units at 02/20/23 0037    balsam peru-castor oiL (VENELEX) ointment, , Topical, BID, Ana Abbasi MD, Given at 02/28/23 0825    acetaminophen (TYLENOL) solution 650 mg, 650 mg, Oral, Q4H PRN, Jess Odell MD, 650 mg at 02/20/23 0401    acetaminophen (TYLENOL) suppository 650 mg, 650 mg, Rectal, Q4H PRN, Jess Odell MD, 650 mg at 02/17/23 2013    HYDROmorphone (DILAUDID) injection 0.5 mg, 0.5 mg, IntraVENous, Q3H PRN, Promise MOLINA MD, 0.5 mg at 02/22/23 2225    HYDROmorphone (DILAUDID) injection 1 mg, 1 mg, IntraVENous, Q4H PRN, Promise MOLINA MD, 1 mg at 02/28/23 0557    heparin 25,000 units in D5W 250 ml infusion, 12-25 Units/kg/hr, IntraVENous, TITRATE, Sloan Whaley MD, Last Rate: 6.8 mL/hr at 02/28/23 0756, 10 Units/kg/hr at 02/28/23 0756    albumin human 25% (BUMINATE) solution 12.5 g, 12.5 g, IntraVENous, Q6H, Sloan Whaley MD, Last Rate: 60 mL/hr at 02/27/23 0020, 12.5 g at 02/28/23 0501    Warfarin - MD/NP dosing, , Other, Rx Dosing/Monitoring, Sloan Whaley MD    [facility]) injection 0.5 mg, 0.5 mg, IntraVENous, Q4H PRN, Sloan Whaley MD, 0.5 mg at 02/17/23 1431    glucose chewable tablet 16 g, 4 Tablet, Oral, PRN, Lizette Linton, NP    glucagon (GLUCAGEN) injection 1 mg, 1 mg, IntraMUSCular, PRN, Lizette Linton, NP    sodium chloride (NS) flush 5-40 mL, 5-40 mL, IntraVENous, Q8H, Link Lebron NP, 10 mL at 02/28/23 0500    sodium chloride (NS) flush 5-40 mL, 5-40 mL, IntraVENous, PRN, Shaila, Lizette B, NP, 40 mL at 02/17/23 1818    naloxone (NARCAN) injection 0.4 mg, 0.4 mg, IntraVENous, PRN, Shaila, Lizette B, NP    ELECTROLYTE REPLACEMENT NOTE: Nurse to review Serum Potassium and Magnesuim levels and Initiate Electrolyte Replacement Protocol as needed, 1 Each, Other, PRN, Shaila, Lizette B, NP    dextrose 10 % infusion 0-250 mL, 0-250 mL, IntraVENous, PRN, Shaila, Lizette B, NP, Last Rate: 150 mL/hr at 02/24/23 0220, 75 mL at 02/24/23 0220    acetaminophen (TYLENOL) tablet 650 mg, 650 mg, Oral, Q6H PRN, Shaila, Lizette B, NP, 650 mg at 02/17/23 0200    ondansetron (ZOFRAN) injection 4 mg, 4 mg, IntraVENous, Q4H PRN, Shaila, Lizette B, NP    albuterol-ipratropium (DUO-NEB) 2.5 MG-0.5 MG/3 ML, 3 mL, Nebulization, Q6H PRN, Shaila, Lizette B, NP    [Held by provider] aspirin chewable tablet 81 mg, 81 mg, Oral, DAILY, Shaila, Lizette B, NP, 81 mg at 02/17/23 0942    senna-docusate (PERICOLACE) 8.6-50 mg per tablet 1 Tablet, 1 Tablet, Oral, DAILY, Shaila, Lizette B, NP, 1 Tablet at 02/24/23 1225    polyethylene glycol (MIRALAX) packet 17 g, 17 g, Oral, DAILY, Shaila, Lizette B, NP, 17 g at 02/23/23 0911    bisacodyL (DULCOLAX) suppository 10 mg, 10 mg, Rectal, DAILY PRN, Shaila, Lizette B, NP, 10 mg at 02/22/23 0839    0.45% sodium chloride infusion, 10 mL/hr, IntraVENous, CONTINUOUS, Khushbu Reeves MD, Last Rate: 10 mL/hr at 02/28/23 0756, 10 mL/hr at 02/28/23 0756    DOBUTamine (DOBUTREX) 500 mg/250 mL (2,000 mcg/mL) infusion, 0-10 mcg/kg/min, IntraVENous, TITRATE, Miroslava Johnson MD, Stopped at 02/23/23 1044    dexmedeTOMidine in 0.9 % NaCl (PRECEDEX) 400 mcg/100 mL (4 mcg/mL) infusion soln, 0.1-1.5 mcg/kg/hr, IntraVENous, TITRATE, Miroslava Johnson MD, Last Rate: 21.2 mL/hr at 02/28/23 1036, 1.5 mcg/kg/hr at 02/28/23 1036    0.9% sodium chloride infusion, 9 mL/hr, IntraVENous, CONTINUOUS, Danis Wyman MD, Last Rate: 14 mL/hr at 02/28/23 0154, 14 mL/hr at 02/28/23 0154    EPINEPHrine (ADRENALIN) 10 mg in 0.9% sodium chloride 250 mL infusion, 0-10 mcg/min, IntraVENous, TITRATE, Lizette Linton, NP, Last Rate: 4.5 mL/hr at 02/28/23 0757, 3 mcg/min at 02/28/23 0757    PATIENT CARE TEAM:  Patient Care Team:  Pedrito Parekh MD as PCP - General (Family Medicine)  Pedrito Parekh MD as PCP - [facility]  Norbert Forbes MD (Cardiovascular Disease Physician)  Gagan Morales MD (Gastroenterology)  Danis Wyman MD (Cardiothoracic Surgery)  Brandie Gomes MD (Cardiovascular Disease Physician)  Marlene Victor MD (Nephrology)  Daniel Pastor MD (Pulmonary Disease)  Jennifer Harvey MD ([address] [place] [address] Vascular Surgery)     Thank you for allowing me to participate in this patient's care.     Warden Pb MD   [address] [place] [address], [address]  Phone: [phone]

## 2023-02-28 NOTE — PROGRESS NOTES
RENAL  PROGRESS NOTE        Subjective:     Remains Intubated. On CRRT. No factor  Objective:   VITALS SIGNS:    Visit Vitals  BP (!) 87/55   Pulse 77   Temp 97.7 °F (36.5 °C)   Resp 19   Ht 5' 6\" (1.676 m)   Wt 62.3 kg (137 lb 5.6 oz)   SpO2 98%   BMI 22.17 kg/m²       O2 Device: Endotracheal tube   O2 Flow Rate (L/min): 20 l/min   Temp (24hrs), Av.2 °F (36.8 °C), Min:95.9 °F (35.5 °C), Max:100 °F (37.8 °C)         PHYSICAL EXAM:  Intubated  +ETT  + LE edema      DATA REVIEW:     INTAKE / OUTPUT:   Last shift:       07 - 1900  In: 390.4 [I.V.:220.4]  Out: 245 [Drains:30]  Last 3 shifts: 1901 -  0700  In: 5573.9 [I.V.:3860.9]  Out: 4923.7 [Drains:170]    Intake/Output Summary (Last 24 hours) at 2023 0941  Last data filed at 2023 0902  Gross per 24 hour   Intake 3801.87 ml   Output 3455.7 ml   Net 346.17 ml           LABS:   Recent Labs     23  0339 23  0312 23  0503   WBC 8.8 8.9 8.1   HGB 7.0* 7.3* 7.5*   HCT 22.8* 24.2* 25.3*    166 134*       Recent Labs     23  0339 23  1835 23  0311 23  1731 23  0503    138 138  --  139   K 4.0 4.0 4.7  --  4.5    106 106  --  106   CO2 25 26 26  --  25   * 105* 158*  --  159*   BUN 25* 23* 22*  --  25*   CREA 1.64* 1.51* 1.63*  --  1.56*   CA 9.7 9.3 9.3  --  8.7   MG 2.8* 3.0* 3.3*  --  3.2*   PHOS 2.8 3.3 2.4*   < >  --    ALB 3.6  --  3.9  --  3.7   TBILI 13.7*  --  14.4*  --  15.3*   ALT 37  --  31  --  23   INR 1.6*  --  1.9*  --  2.6*    < > = values in this interval not displayed. Assessment:  TANNER on CKD-3a: Suspect cardiorenal effects with needed diuresis. Now has LVAD and  POST OP ATN. Anuric->Requiring CRRT     Ischemic CM: Recurrent exacerbations. EF 20%. S/p LVAD on 2/15. Required Impella RP for RV support-> attempts to wean not tolerated by patient.      Sepsis: Urosepsis-> growing candida    BPH     Well differentiated NET Grade 1: noted on EGD 1/18/23     Anemia 2 to CKD:  s/p PRBCS      Left UE basilic and radial vein thrombus    Thrombocytopenia       Plan/Recommendations:  Seen on CVVH .   Factor rate->  0  Switch to 2 K Prismasol bags    Levophed/Epi     Daily CRRT labs   Phos better  BRIGHT  PRBC as per cardiac team    Discussed with LORI medina MD  [address]

## 2023-02-28 NOTE — PROGRESS NOTES
Bedside and Verbal shift change report given to [address] (oncoming nurse) by Ervin Moore (offgoing nurse). Report included the following information SBAR, Kardex, Intake/Output, and MAR. 56 1 uRBC    1100 Dr. Jaun Rubin and Dr. Олег Haley at bedside for KIARRA and RP wean  Propofol gtt started and titrated per Dr. Geetha Gomez gtt required for propofol sedation    Weaning propofol gtt, precedex and dilaudid for sedation    3 loose BM today    Bedside and Verbal shift change report given to [address] (oncoming nurse) by Lucita Mann RN (offgoing nurse). Report included the following information SBAR, OR Summary, Intake/Output, and Cardiac Rhythm 1\"AVB .

## 2023-02-28 NOTE — DIALYSIS
CRRT / [phone]    Orders   Mode: CVVHD rounding @0900   Blood Flow Rate:  200 mL/min; lowered per order during rounding   Prismasol Dose:  DFR: 1750 mL/ hr   Prismasol Concentrate:  2K; 3.5Ca   Blood Warmer Temp: 37C   Net Fluid Removal:  0mL/hr     Metrics   BP: 104/55   HR: 69   Access Pressure: -34   Filter Pressure: 123   Return Pressure: 46   TMP: 41   Pressure Drop: 24     Access   Type & Location:  [facility]   Comments:    Dressing changed per hospital policy, dated for today. Running well at . No s/s of infection. Labs   HBsAg (Antigen) / date:   2/18/23 Neg Ag                                        HBsAb (Antibody) / date: 2/18/23 Susceptible   Source:  Epic     Safety:   Time Out Done:   (Time)  7627   Consent obtained/signed: verified   Education:  Primary RN educated on deaeration chamber disabled; manual corrections Q1hr   Primary Nurse Rpt Pre:  Leobardo Payne RN   Primary Nurse Rpt Post:  Leobardo Payne RN     Comments / Plan:    Patient notes, labs, code status, and consent reviewed. Time out complete. CVVHD running well at this time. No indication for change. Lines are visible and secure with Thermax set at 37C.

## 2023-02-28 NOTE — DIABETES MGMT
[facility]  DIABETES MANAGEMENT CONSULT    Consulted by  Dmitriy Crowder MD   for advanced nursing evaluation and care for inpatient blood glucose management. Evaluation and Action Plan   Jem Dillard is a 71year old gentleman, with Type 2 Diabetes with a recent A1C of 7.7%, who is admitted with arrhythmias and acute on chronic HFrEF with failure to respond to inotrope support. He was discharged one week prior from a prolonged hospital stay for acute on chronic HF and LVAD work-up and discharged home on dobutamine with a lifevest.  Glucose control was tenuous during his previous admission s/t multiple co-morbidities, several tests/procedures requiring NPO status, waxing and waining oral intake. At this time glucose is impacted by:  Heart Failure with reduced EF 25-30%, LVAD implant and RVAD  Vasopressor use: weaning  TANNER on CVVHD  Enteral Feeds    This admission, he required low basal doses but high bolus doses with meals to offset pre-prandial hyperglycemia. An insulin gtt has been used for glycemic control post-operatively and insulin rates have been erratic over the last 2 weeks. His insulin rates have been stable over the last 4 days and reasonable to transition off to SQ basal/correction insulin. BG target is now 140-180mg/dl. Action Plan    Continue NPH 20 units Q12. Will add thee total amt of correction he receives today to total basal dose. Humalog at normal sensitivity Q4h            Initial Presentation   Jem Dillard is a 71 y.o. male who presented to the ED 1/26/23 with lower extremity swelling and difficulty breathing. He had a prolonged hospital admission from 12/22/22-1/19/23 for acute on chronic systolic HF and LVAD work-up. He was discharged with home inotrope. LAB: , GFR 58, Trop 2003, BNP 4524  CXR: New diffuse bilateral increased interstitial markings, partially obscuring bilateral pulmonary nodules.      HX:   Past Medical History: Diagnosis Date    CAD (coronary artery disease) 11/10/2016    NSTEMI & 2 stents    Deafness 10/28/2012    DM (diabetes mellitus) ([facility] [address]     Elevated cholesterol     Hypertension     NSTEMI (non-ST elevated myocardial infarction) ([facility] 11/10/2016        INITIAL DX:   CHF (congestive heart failure) ([facility] [I50.9]     Current Treatment     TX: Milrinone, Amio. Specialty consultations: [facility], Cardiology, EP, GI     Hospital Course   Clinical progress has been complicated by:    2/38: Admission. Rapid response for SVT- Given adenosine x2, amio bolus/gtt  1/27: Advanced HFC consult. EP consult ordered. Intolerance of inotropic support. Cardiology consult. NSTEMI, heparin gtt started. Seen by EP: Continue amio. 1/28: Febrile: CT chest 1/28 shows diffuse focal opacities concerning for pneumonia. Obtain blood cultures, UA. Pathology report 1/18/23: well differentiated neuroendocrine tumor grade 1. EGD: Patchy erythema gastric body, biopsies done. 6 mm sessile polyp gastric body biopsied  1/30: Oncology consult: Recommend multiphase CT of the abdomen and pelvis to evaluate for metastatic spread. Tachycardia and SOB, BiPAP overnight. 2/3: Accepted for VAD implant if renal fx improves per [facility]. CCU Transfer and swan placed  2/4: PRBC transfusion   2/6: With increased dyspnea. Doubtamine started  2/15: LVAD Implant  2/17: Extubated. CRRT started. ECHO with persistent RV failure. OR for RV impella. Remained Intubated post-op  2/20: UA with large leuk esterase and 2+ bacteria.  Urine Cx with yeast  2/21: PRBC transfusion, PLT transfusion    2/22: Bronch   Diabetes History   Type 2 Diabetes  Ambulatory BG management provided by: PCP Juan Luis Eason MD    Diabetes-related Medical History  Acute complications  Acute hyperglycemia  Neurological complications  Peripheral neuropathy  Microvascular disease  Nephropathy  Macrovascular disease  CAD  Other associated conditions                                       CHF Diabetes Medication History  Key Antihyperglycemic Medications        Diabetes Medication History  Key Antihyperglycemic Medications               metFORMIN (GLUCOPHAGE) 500 mg tablet (Taking/) Take 1 Tablet by mouth two (2) times daily (with meals) for 30 days. glipiZIDE (GLUCOTROL) 5 mg tablet (Taking) Take 1 tablet by mouth twice daily    Januvia 50 mg tablet (Taking) Take 1 tablet by mouth once daily             Diabetes self-management practices:   Eating pattern                Eats 3 small meals daily  [x]         Breakfast                         2 Eggs, Coffee  [x]         Lunch                               [place]  [x]         Dinner                              \" Food\" Bread, rice, lentils   [x]         Bedtime                           [place]  [x]         Snacks                              Afternoon snack  [x]         Beverages                        Water, Coffee  Physical activity pattern                Sedentary   Monitoring pattern  Discharged last week with a [place] CGM and serum glucometer  7 day average Ba-9a: 129-164  9a-12: 243  Noon to 9p: 198-238  1 low BG in the last week overnight    Taking medications pattern  [x]         Consistent administration  [x]         Affordable  Social determinants of health impacting diabetes self-management practices   Concerned that you need to know more about how to stay healthy with diabetes  Overall evaluation:                 [x]         Achieving A1c target with drug therapy & self-care practices    Subjective     Objective   Physical exam  General Underweight [place] ([facility]) male in critical condition in CVICU. Intubated. LVAD. RV Impella, CVVHD  Neuro  Sedated   Vital Signs Visit Vitals  BP (!) 87/55   Pulse 76   Temp 98.1 °F (36.7 °C)   Resp (!) 0   Ht 5' 6\" (1.676 m)   Wt 62.3 kg (137 lb 5.6 oz)   SpO2 99%   BMI 22.17 kg/m²     Skin  Warm and dry. No acanthosis noted along neckline. Sternal surgical incision. Chest tubes.  LVAD  Heart Regular rate and rhythm. No murmurs, rubs or gallops  Lungs  Clear to auscultation without rales or rhonchi  Extremities No foot wounds        Laboratory  Recent Labs     23  0339 23  1835 23  0312 23  0311 23  0503   * 105*  --  158* 159*   AGAP 7 6  --  6 8   WBC 8.8  --  8.9  --  8.1   CREA 1.64* 1.51*  --  1.63* 1.56*   *  --   --  96* 80*   ALT 37  --   --  31 23         Factors impacting BG management  Factor Dose Comments   Nutrition:  Standard meals     Enteral feeds  Vital 1.5 @ 40 ml/hr  165g CHO/24h      Heart Failure/Cardiogenic Shock HeartMate 3 LVAD  Dobutamine: Off  Epinephrine   Norepi- weaning  Failed RVAD wean    Lactic Acidosis Resolved    Septic Shock UTI/PNA  Urine Cx  with yeast  IV antibiotics   Fevers    Other:   Kidney function TANNER on CKD:   Current GFR 44  CVVHD    Shock Liver LFTs- improving      Blood glucose pattern    Significant diabetes-related events over the past 24-72 hours  A1C 7.7% 23  Pre-op: Basal: Lantus 6 units daily and Bolus: 10 units Humalog/meal  Insulin gtt:   2/15: 2.8 units  16: 32.8 units  : 23.1 units  18: 19.9 units  : 8.7 units  : 53.6 units  : 99 units   : 94.9 units  : 69.1 units  : 46.2  : 57.5 units  : 48 units  : 42 units since MN    Gtts: Epi (3)  Fasting B  NPH: 20 units Q12  Correction: 6 units in the last 24h  Off insulin gtt: 60 (erroneous) 162-198  Plan for bedside RVAD wean under KIARRA today.     Assessment and Nursing Intervention   Nursing Diagnosis Risk for unstable blood glucose pattern   Nursing Intervention Domain 8997 Decision-making Support   Nursing Interventions Examined current inpatient diabetes/blood glucose control   Explored factors facilitating and impeding inpatient management  Explored corrective strategies with patient and responsible inpatient provider   Informed patient of rational for insulin strategy while hospitalized     Billing Code(s)   No charge    Before making these care recommendations, I personally reviewed the hospitalization record, including notes, laboratory & diagnostic data and current medications, and examined the patient at the bedside (circumstances permitting) before determining care. More than fifty (50) percent of the time was spent in patient counseling and/or care coordination.   Total minutes: 13    SLICK Rodriguez  Diabetes Clinical Nurse Specialist  [facility]  Access via [employer]

## 2023-02-28 NOTE — PROGRESS NOTES
2000 Bedside shift change report given to [address] (oncoming nurse) by Lucita Mann RN (offgoing nurse). Report included the following information SBAR, Kardex, ED Summary, OR Summary, Procedure Summary, Intake/Output, MAR, Accordion, Recent Results, and Cardiac Rhythm 1 AVB . Impella RP and LVAD settings noted. Neuro status examined- able to follow commands and move all extremities; shakes head 'yes' and 'no' appropriately. Dialysis RN at bedside- setting up new pump. 2105 Impella representative called RN- updates given. 2200 CRRT restarted. 2230 Levo restarted at [facility] for MAPs in 46s. 0435 CXR at bedside. 0600 Levo STOPPED- MAPs in 80s; patient appears in pain- medication given. Throughout shift, patient required intermittent levo infusion for MAP goal.     0730 Bedside shift change report given to [address] (oncoming nurse) by Harshal Lopez RN (offgoing nurse). Report included the following information SBAR, Kardex, ED Summary, OR Summary, Procedure Summary, Intake/Output, MAR, Accordion, Recent Results, and Cardiac Rhythm 1AVB . _______________________________________________________  Problem: Diabetes Self-Management  Goal: *Disease process and treatment process  Description: Define diabetes and identify own type of diabetes; list 3 options for treating diabetes. Outcome: Progressing Towards Goal  Goal: *Incorporating nutritional management into lifestyle  Description: Describe effect of type, amount and timing of food on blood glucose; list 3 methods for planning meals. Outcome: Progressing Towards Goal  Goal: *Incorporating physical activity into lifestyle  Description: State effect of exercise on blood glucose levels. Outcome: Progressing Towards Goal  Goal: *Developing strategies to promote health/change behavior  Description: Define the ABC's of diabetes; identify appropriate screenings, schedule and personal plan for screenings.   Outcome: Progressing Towards Goal  Goal: *Using medications safely  Description: State effect of diabetes medications on diabetes; name diabetes medication taking, action and side effects. Outcome: Progressing Towards Goal  Goal: *Monitoring blood glucose, interpreting and using results  Description: Identify recommended blood glucose targets  and personal targets. Outcome: Progressing Towards Goal  Goal: *Prevention, detection, treatment of acute complications  Description: List symptoms of hyper- and hypoglycemia; describe how to treat low blood sugar and actions for lowering  high blood glucose level. Outcome: Progressing Towards Goal  Goal: *Prevention, detection and treatment of chronic complications  Description: Define the natural course of diabetes and describe the relationship of blood glucose levels to long term complications of diabetes. Outcome: Progressing Towards Goal  Goal: *Developing strategies to address psychosocial issues  Description: Describe feelings about living with diabetes; identify support needed and support network  Outcome: Progressing Towards Goal  Goal: *Insulin pump training  Outcome: Progressing Towards Goal  Goal: *Sick day guidelines  Outcome: Progressing Towards Goal  Goal: *Patient Specific Goal (EDIT GOAL, INSERT TEXT)  Outcome: Progressing Towards Goal     Problem: Patient Education: Go to Patient Education Activity  Goal: Patient/Family Education  Outcome: Progressing Towards Goal     Problem: Pressure Injury - Risk of  Goal: *Prevention of pressure injury  Description: Document Mike Scale and appropriate interventions in the flowsheet.   Outcome: Progressing Towards Goal  Note: Pressure Injury Interventions:  Sensory Interventions: Assess changes in LOC, Assess need for specialty bed, Avoid rigorous massage over bony prominences, Check visual cues for pain, Discuss PT/OT consult with provider, Float heels, Maintain/enhance activity level, Keep linens dry and wrinkle-free, Monitor skin under medical devices, Pad between skin to skin, Pressure redistribution bed/mattress (bed type), Sit a 90-degree angle/use footstool if needed    Moisture Interventions: Apply protective barrier, creams and emollients, Assess need for specialty bed, Check for incontinence Q2 hours and as needed, Contain wound drainage, Limit adult briefs    Activity Interventions: Pressure redistribution bed/mattress(bed type), PT/OT evaluation    Mobility Interventions: Float heels, HOB 30 degrees or less, Pressure redistribution bed/mattress (bed type), PT/OT evaluation    Nutrition Interventions: Document food/fluid/supplement intake    Friction and Shear Interventions: Apply protective barrier, creams and emollients, Foam dressings/transparent film/skin sealants, Feet elevated on foot rest, HOB 30 degrees or less, Lift sheet, Lift team/patient mobility team, Minimize layers                Problem: Patient Education: Go to Patient Education Activity  Goal: Patient/Family Education  Outcome: Progressing Towards Goal     Problem: Falls - Risk of  Goal: *Absence of Falls  Description: Document Laverne Fall Risk and appropriate interventions in the flowsheet.   Outcome: Progressing Towards Goal  Note: Fall Risk Interventions:  Mobility Interventions: Communicate number of staff needed for ambulation/transfer         Medication Interventions: Evaluate medications/consider consulting pharmacy    Elimination Interventions: Bed/chair exit alarm    History of Falls Interventions: Evaluate medications/consider consulting pharmacy         Problem: Patient Education: Go to Patient Education Activity  Goal: Patient/Family Education  Outcome: Progressing Towards Goal     Problem: Pain  Goal: *Control of Pain  Outcome: Progressing Towards Goal  Goal: *PALLIATIVE CARE:  Alleviation of Pain  Outcome: Progressing Towards Goal     Problem: Patient Education: Go to Patient Education Activity  Goal: Patient/Family Education  Outcome: Progressing Towards Goal     Problem: Patient Education: Go to Patient Education Activity  Goal: Patient/Family Education  Outcome: Progressing Towards Goal     Problem: Patient Education: Go to Patient Education Activity  Goal: Patient/Family Education  Outcome: Progressing Towards Goal     Problem: Nutrition Deficit  Goal: *Optimize nutritional status  Outcome: Progressing Towards Goal     Problem: Ventilator Management  Goal: *Adequate oxygenation and ventilation  Outcome: Progressing Towards Goal  Goal: *Patient maintains clear airway/free of aspiration  Outcome: Progressing Towards Goal  Goal: *Absence of infection signs and symptoms  Outcome: Progressing Towards Goal  Goal: *Normal spontaneous ventilation  Outcome: Progressing Towards Goal     Problem: Patient Education: Go to Patient Education Activity  Goal: Patient/Family Education  Outcome: Progressing Towards Goal     Problem: Patient Education: Go to Patient Education Activity  Goal: Patient/Family Education  Outcome: Progressing Towards Goal     Problem: LVAD Pre-op Period  Goal: Off Pathway (Use only if patient is Off Pathway)  Outcome: Progressing Towards Goal  Goal: Activity/Safety  Outcome: Progressing Towards Goal  Goal: Consults, if ordered  Outcome: Progressing Towards Goal  Goal: Diagnostic Test/Procedures  Outcome: Progressing Towards Goal  Goal: Nutrition/Diet  Outcome: Progressing Towards Goal  Goal: Medications  Outcome: Progressing Towards Goal  Goal: Treatments/Interventions/Procedures  Outcome: Progressing Towards Goal  Goal: Discharge Planning  Outcome: Progressing Towards Goal  Goal: Psychosocial  Outcome: Progressing Towards Goal  Goal: *Hemodynamically stable (eg: Cardiac output/index; pulmonary arterial pressures; mixed venous oxygen saturation within set parameters)  Outcome: Progressing Towards Goal  Goal: *Labs/tests completed  Outcome: Progressing Towards Goal     Problem: LVAD Pre-Op Day  Goal: Off Pathway (Use only if patient is Off Pathway)  Outcome: Progressing Towards Goal  Goal: Activity/Safety  Outcome: Progressing Towards Goal  Goal: Consults, if ordered  Outcome: Progressing Towards Goal  Goal: Diagnostic Test/Procedures  Outcome: Progressing Towards Goal  Goal: Nutrition/Diet  Outcome: Progressing Towards Goal  Goal: Medications  Outcome: Progressing Towards Goal  Goal: Treatments/Interventions/Procedures  Outcome: Progressing Towards Goal  Goal: Discharge Planning  Outcome: Progressing Towards Goal  Goal: Psychosocial  Outcome: Progressing Towards Goal  Goal: *Vital signs within specified parameters  Outcome: Progressing Towards Goal  Goal: *Consent obtained, permits and diagnostics complete  Outcome: Progressing Towards Goal  Goal: *Confirmation of post discharge primary support person(s)  Outcome: Progressing Towards Goal     Problem: LVAD Day of Surgery (Initiate SCIP measure for post-op care)  Goal: Off Pathway (Use only if patient is Off Pathway)  Outcome: Progressing Towards Goal  Goal: Activity/Safety  Outcome: Progressing Towards Goal  Goal: Consults, if ordered  Outcome: Progressing Towards Goal  Goal: Diagnostic Test/Procedures  Outcome: Progressing Towards Goal  Goal: Nutrition/Diet  Outcome: Progressing Towards Goal  Goal: Medications  Outcome: Progressing Towards Goal  Goal: Respiratory  Outcome: Progressing Towards Goal  Goal: Treatments/Interventions/Procedures  Outcome: Progressing Towards Goal  Goal: Psychosocial  Outcome: Progressing Towards Goal  Goal: *Hemodynamically stable (eg: Cardiac output/index; pulmonary arterial pressures; mixed venous oxygen saturation within set parameters)  Outcome: Progressing Towards Goal  Goal: *Lungs clear or at baseline  Outcome: Progressing Towards Goal  Goal: *Stable cardiac rhythm  Outcome: Progressing Towards Goal  Goal: *Follows simple commands post anesthesia  Outcome: Progressing Towards Goal  Goal: *Optimal pain control at patient's stated goal  Outcome: Progressing Towards Goal  Goal: *LVAD parameters within set limits  Outcome: Progressing Towards Goal     Problem: Nutrition Deficit  Goal: *Optimize nutritional status  Outcome: Progressing Towards Goal

## 2023-03-01 NOTE — DIALYSIS
CRRT / [phone]    Orders   Mode: CVVHD rounding no indication for change    Blood Flow Rate: 200 ml/min   Prismasol Dose: 1750 ml/hr Dialysate   Prismasol Concentrate: 4K/2.5ca   Blood Warmer Temp: 37*C   Net Fluid Removal: 0 ml/hr     Metrics   BP: 109/57   HR: 80   Access Pressure: -55   Filter Pressure: 132   Return Pressure: 48   TMP: 28   Pressure Drop: 46     Access   Type & Location: [facility] temporary CVC   Comments: Dressing CDI, dated 02/28/23 LINES REVERSED            Labs   HBsAg (Antigen) / date: Negative 2/18/23                                              HBsAb (Antibody) / date: Susceptible 2/18/23   Source: [facility]     Safety:   Time Out Done:   (Time) 0700   Consent obtained/signed: Verified   Education: Pt intubated/sedated   Primary Nurse Rpt Pre: Jorge Lima RN    Primary Nurse Rpt Post: Jorge Lima RN      Comments / Plan:      Patients labs, code status, consent and orders verified. CVVHD rounding no indication for change at this time. Lines REVERSED, visible and connections secure with Thermax blood  at 37*C. Education & pre/post report with primary RN. Discussed AM labs with Dr. Ethel Nix, change to 4K/2.5 calcium, order updated and primary nurse notified.

## 2023-03-01 NOTE — PROGRESS NOTES
Critical Care Note      Cardiac surgery was called for the evaluation and management of: Stage D heart failure, cardiogenic shock      The critical nature of the patient's condition includes the following: the patient is at imminent risk of clinical deterioration and death      Critical care diagnoses that are being treated:     Cardiogenic shock   RV failure     Cardiogenic Shock   Remains intubated and sedated  Lactic acid 1.6  Creatinine 1.6  LFTs 100's  NT pro-BNP 13K     RV failure  Continues on RVAD support  Continues on Epi 3  Tolerated RVAD wean much better today  Looking at removal tomorrow      Renal failure   Continues on CVVH  Holding factor due to hypotension                Intake/Output Summary (Last 24 hours) at 3/1/2023 1108  Last data filed at 3/1/2023 1059  Gross per 24 hour   Intake 3877.82 ml   Output 3078 ml   Net 799.82 ml      Visit Vitals  BP (!) 109/57 Comment: ARTERIAL LINE READING   Pulse 78   Temp 98.8 °F (37.1 °C)   Resp 18   Ht 5' 6\" (1.676 m)   Wt 134 lb 14.7 oz (61.2 kg)   SpO2 99%   BMI 21.78 kg/m²      CXR Results  (Last 48 hours)                 03/01/23 0459  XR CHEST PORT Final result    Impression:      1. ET tube is in satisfactory position. 2. Mild interstitial edema, unchanged. Narrative:  EXAM: XR CHEST PORT       DATE: 3/1/2023 4:59 AM       INDICATION: s/p VAD intubated, impella       COMPARISON: Chest radiograph February 28, 2023       FINDINGS: AP portable chest radiograph. ET tube is in satisfactory position. NG   tube courses to the abdomen and beyond the field-of-view. Patient is status post   sternotomy. There is an LVAD in place. Impella device is stable in position. Subxiphoid drains are noted. LEFT and RIGHT IJ catheters are grossly stable. The   heart size is normal. Vascular clarity is mildly diminished. No definite   effusion or pneumothorax is seen.            02/28/23 2041  XR CHEST PORT Final result    Impression:      Pulmonary edema unchanged. Increased airspace opacity in the right mid and lower   lung zone. Small bilateral pleural effusions. Narrative:  EXAM:  XR CHEST PORT       INDICATION: LVAD. Impella. COMPARISON: 2/27/2020       TECHNIQUE: Semiupright portable chest AP view       FINDINGS: Tubes and lines are stable. Median sternotomy changes. Borderline   heart size. Pulmonary edema. Increased airspace opacity in the right mid and lower lung zone. Small bilateral   pleural effusions. The visualized bones and upper abdomen are age-appropriate. LVAD   Pump Speed (RPM): 4600  Pump Flow (LPM): 3.1  MAP: 70 (L brachial doppler)  PI (Pulsitility Index): 71  Power: 2.9   Test: Yes  Back Up  at Bedside & Labeled: Yes  Power Module Test: Yes  Driveline Site Care: No  Driveline Dressing: Clean, Dry, and Intact  Testing  Alarms Reviewed: Yes  Back up SC speed: 4600  Back up Low Speed Limit: 4200  Emergency Equipment with Patient?: Yes  Emergency procedures reviewed?: Yes  Drive line site inspected?: No  Drive line intergrity inspected?: Yes  Drive line dressing changed?: No         I personally spent the above critical care time. This is time spent at this critically ill patient's bedside / unit / floor actively involved in patient care as well as the coordination of care. This does not include any procedural time which has been billed separately. This does not include any NP/PA patient care time. I had a face to face encounter with the patient, reviewed and interpreted patient data including clinical events, labs, images, vital signs, I/O's, and examined patient. I have discussed the case and the plan and management of the patient's care with the consulting services and bedside nurses. This patient has a high probability of imminent, clinically significant deterioration, which requires the highest level of preparedness to intervene urgently.  I participated in the decision-making and personally managed or directed the management of life and organ supporting interventions to treat or prevent imminent deterioration. This patient has a critical illness or injury that is acutely impairing one or more vital organ systems such that there is a high probability of imminent or life threatening deterioration in the patient's condition. This patient requires high complexity medical decision making to assess, manipulate, and support vital organ system failure. After stabilization, this patient still requires management to prevent further life / organ threatening deterioration.

## 2023-03-01 NOTE — PROGRESS NOTES
0800- bedside report received. Patient intubated (AC18/350,40%/5), sedated (propofol, precedex, diluadid PCA), LVAD (4600/3.2/6.4/2.9), RP (P6), CRRT (0 factor). Possible RP removal today; will clarify with team during rounds. NICOM Hemodynamic Monitoring     Visit Vitals  BP (!) 109/57 Comment: ARTERIAL LINE READING   Pulse 89   Temp 97.9 °F (36.6 °C)   Resp 9   Ht 5' 6\" (1.676 m)   Wt 61.2 kg (134 lb 14.7 oz)   SpO2 98%   BMI 21.78 kg/m²         Baseline assessment:        CO 3.8        CI 2.3        SVI 28        SVV 21        TPR 1489       0845- MD Elizabeth at bedside; update given and plan discussed. Plan to remove RP this morning at bedside, and keep heparin drip running. Sadie 2365, NP called and stated she consented wife over the phone; will call wife back and have 2 RN's verify consent form. 5252- consent obtained from wife via telephone; questions and concerns addressed. 1044- OR team coming to set up at bedside. KIARRA and nitric in room per Dorene Coulter MD.    1055- RP impella removed, femstop placed on left groin. 1118- labile MAP's 48-95 with levophed and epi. Orders from Jessie Ashraf MD for albumin. 1135- MAP's stabilizing with albumin infusing, now 66-72.    1200- NICOM Hemodynamic Monitoring     Visit Vitals  BP (!) 109/57 Comment: ARTERIAL LINE READING   Pulse 89   Temp 97.9 °F (36.6 °C)   Resp 9   Ht 5' 6\" (1.676 m)   Wt 61.2 kg (134 lb 14.7 oz)   SpO2 98%   BMI 21.78 kg/m²         Baseline assessment:        CO 4.8        CI 2.8        SVI 34        SVV 19        TPR 1344       1225- dobhoff placed in right nare per order. KUB per policy. 18- wife and daughter updated via phone. Questions addressed and emotional support provided. 1355- TF restarted as ordered.     25 Lennie Stovall MD at bedside for updates; CI 3.6, orders to decrease epi to 2 mcg.    1509- albumin given per Agustin Tolentino MD; CVP 7.    1600- NICOM Hemodynamic Monitoring     Visit Vitals  BP (!) 109/57 Comment: ARTERIAL LINE READING   Pulse 80   Temp 98.1 °F (36.7 °C)   Resp 16   Ht 5' 6\" (1.676 m)   Wt 61.2 kg (134 lb 14.7 oz)   SpO2 97%   BMI 21.78 kg/m²         Baseline assessment:        CO 8.3        CI 4.9        SVI 61        SVV 15            2000- Bedside and Verbal shift change report given to Connie King RN (oncoming nurse) by Eliseo Garcia RN (offgoing nurse). Report included the following information SBAR, Recent Results, and Cardiac Rhythm 1st degree .

## 2023-03-01 NOTE — PROGRESS NOTES
[facility]    ICU TEAM Progress Note    Name: Jim Warren   : 1950   MRN: [id number]   Date: 3/1/2023      Assessment and Plan:     Briefly, Pt is 71 y.o w HFrEF who underwent LVAD placement on 2/15. Post op course complicated by TANNER requiring CRRRT as well as RV failure requiring Impella RP. ICU Problems:    Acute on chronic HFrEF (20%) NYHA IIIb/IV (D) on home inotropic support, life vest  TANNER on CKD3  CHB post op  Aflutter w/ RVR  Acute on chronic hypoxemic respiratory failure  CAP  CAD  Gastric neuroendocrine tumor  Hx of LUE DVT  DM  PAD  TRISTAN  BPH w/ urinary retention requiring chronic donnelly      ICU Checklist       F - Feeding:  Yes TF  A - Analgesia: None  S - Sedation: Propofol and Precedex  T - DVT Prophylaxis: Heparin   H - Head of Bed: > 30 Degrees  U - Ulcer Prophylaxis: Protonix (pantoprazole)   G - Glycemic Control: Insulin  S - Spontaneous Breathing Trial: Pending  B - Bowel Regimen: MiraLax  I - Indwelling Catheter:   Tubes: ETT  Lines: Peripheral IV, Arterial Line, and Central Line  Drains: None      N- Sedated  Resp - on vent will wean  CV - LVAD. Impella RP removed today. Ongoing inotropic support  GI: TF  Renal: Renal failure on CRRT  Heme-  heparin gtt. On coumadin. 1 prbc today  ID - Septic shock.   On Fluc, flagyl, zosyn, and vanco      POD:  10 Days Post-Op    S/P:   Procedure(s):  LEFT GROIN RP IMPELLA INSERTION, KIARRA BY DR Irma Moffett    Active Problem List:     Problem List  Date Reviewed: 2023            Codes Class    Dyslipidemia, goal LDL below 70 ICD-10-CM: E78.5  ICD-9-CM: 272.4         PAD (peripheral artery disease) ([facility] ICD-10-CM: I73.9  ICD-9-CM: 323.9         Systolic CHF, acute on chronic (HCC) ICD-10-CM: I50.23  ICD-9-CM: 428.23, 428.0         Receiving inotropic medication ICD-10-CM: Z79.899  ICD-9-CM: V49.89         CHF (congestive heart failure) (HCC) ICD-10-CM: I50.9  ICD-9-CM: 428.0         CKD stage 3 due to type 2 diabetes mellitus ([facility] ICD-10-CM: E11.22, N18.30  ICD-9-CM: 250.40, 585.3         S/P CABG x 3 ICD-10-CM: Z95.1  ICD-9-CM: V45.81     Overview Signed 7/9/2018 11:20 AM by SCAR Morin     Coronary Artery Bypass Grafting x 3, LIMA to LAD, RSVG to OM, RSVG to RCA  Right GSV EVH             Coronary artery disease involving native coronary artery of native heart without angina pectoris ICD-10-CM: I25.10  ICD-9-CM: 414.01         Hypertension, essential ICD-10-CM: I10  ICD-9-CM: 401.9         Colon cancer screening ICD-10-CM: Z12.11  ICD-9-CM: V76.51         Nonrheumatic aortic valve stenosis ICD-10-CM: I35.0  ICD-9-CM: 424.1         Bruit of right carotid artery ICD-10-CM: R09.89  ICD-9-CM: 785. 9         Type 2 diabetes mellitus with hyperglycemia (HCC) ICD-10-CM: E11.65  ICD-9-CM: 250.00         Deafness ICD-10-CM: H91.90  ICD-9-CM: 389.9         Cramp of limb ICD-10-CM: R25.2  ICD-9-CM: 729.82          Past Medical History:      has a past medical history of CAD (coronary artery disease) (11/10/2016), Deafness (10/28/2012), DM (diabetes mellitus) [facility] Elevated cholesterol, Hypertension, and NSTEMI (non-ST elevated myocardial infarction) ([facility] (11/10/2016). Past Surgical History:      has a past surgical history that includes hx appendectomy; pr unlisted procedure cardiac surgery (11/11/2016); colonoscopy (N/A, 6/28/2018); and colonoscopy (N/A, 1/18/2023). Home Medications:     Prior to Admission medications    Medication Sig Start Date End Date Taking? Authorizing Provider   polyethylene glycol (Miralax) 17 gram/dose powder Take 17 g by mouth daily as needed for Constipation. Yes Provider, Historical   furosemide (LASIX) 20 mg tablet Take 1 Tablet by mouth daily as needed (for weight greater than 120 lb by 2-3 lb or shortness of breath).  1/23/23  Yes Marisa Linton, NP   glipiZIDE (GLUCOTROL) 5 mg tablet Take 1 tablet by mouth twice daily 12/2/22  Yes Erin Broderick MD   ipratropium (ATROVENT) 21 mcg (0.03 %) nasal spray 2 Sprays by Both Nostrils route every twelve (12) hours. 11/21/22  Yes Tigist Quintanilla MD   ergocalciferol (ERGOCALCIFEROL) 1,250 mcg (50,000 unit) capsule Take 1 Capsule by mouth every seven (7) days. Patient taking differently: Take 50,000 Units by mouth every seven (7) days. Mondays 10/14/22  Yes Tigist Quintanilla MD   Januvia 50 mg tablet Take 1 tablet by mouth once daily 9/23/22  Yes Tigist Quintanilla MD   rosuvastatin (CRESTOR) 20 mg tablet Take 1 Tablet by mouth nightly. 2/28/22  Yes Vira Mueller MD   aspirin delayed-release 81 mg tablet Take 81 mg by mouth daily. Yes Provider, Bob   zolpidem (AMBIEN) 5 mg tablet Take 1 Tablet by mouth nightly as needed for Sleep. Max Daily Amount: 5 mg. 1/24/23   Tigist Quintanilla MD   nitroglycerin (NITROSTAT) 0.4 mg SL tablet 1 Tablet by SubLINGual route every five (5) minutes as needed for Chest Pain. Up to 3 doses. 11/16/22   Shantell Burton MD       Allergies/Social/Family History: Allergies   Allergen Reactions    Ambien [Zolpidem] Other (comments)     Causes extreme confusion      Social History     Tobacco Use    Smoking status: Never     Passive exposure: Never    Smokeless tobacco: Never   Substance Use Topics    Alcohol use:  Yes     Alcohol/week: 2.0 standard drinks     Types: 1 Cans of beer, 1 Shots of liquor per week     Comment: rarely      Family History   Problem Relation Age of Onset    Heart Disease Father     Heart Attack Father     Hypertension Mother     Elevated Lipids Brother     Elevated Lipids Brother     No Known Problems Sister     Elevated Lipids Brother     No Known Problems Son     No Known Problems Daughter     Anesth Problems Neg Hx          Objective:   Vital Signs:  Visit Vitals  BP (!) 109/57 Comment: ARTERIAL LINE READING   Pulse 89   Temp 98.1 °F (36.7 °C)   Resp 9   Ht 5' 6\" (1.676 m)   Wt 61.2 kg (134 lb 14.7 oz)   SpO2 98%   BMI 21.78 kg/m²    O2 Flow Rate (L/min): 20 l/min O2 Device: Endotracheal tube, Heated, Ventilator Temp (24hrs), Av.7 °F (33.7 °C), Min:76.3 °F (24.6 °C), Max:100.4 °F (38 °C)    CVP (mmHg): 324 mmHg (23 1400)      Intake/Output:     Intake/Output Summary (Last 24 hours) at 3/1/2023 1420  Last data filed at 3/1/2023 1359  Gross per 24 hour   Intake 3605.29 ml   Output 3289.3 ml   Net 315.99 ml         Physical Exam:    GEN:  ill appearing  Neuro:  Sedated  CV Reg  Resp Course  Abd soft   Extremities minimal edema    LABS AND  DATA: Personally reviewed  Recent Labs     23  04323  1614 23  033   WBC 8.4  --  8.8   HGB 8.9* 8.7* 7.0*   HCT 27.3* 27.2* 22.8*     --  171       Recent Labs     23  04323  1614    138   K 3.2* 3.6    106   CO2 24 25   BUN 19 21*   CREA 1.59* 1.61*   * 122*   CA 10.8* 10.3*   MG 2.5* 2.6*   PHOS 2.2* 2.4*       Recent Labs     23  033   * 132*   TP 6.0* 5.8*   ALB 3.7 3.6   GLOB 2.3 2.2       Recent Labs     23  0859 23  0437 23  0109 23  0615 23  0339   INR  --  1.5*  --   --  1.6*   PTP  --  15.1*  --   --  15.8*   APTT 66.6*  --  79.1*   < >  --     < > = values in this interval not displayed. Recent Labs     23  0555 23  0516   PHI 7.45 7.40   PCO2I 36.0 39.6   PO2I 99 142*       No results for input(s): CPK, CKMB, TROIQ, BNPP in the last 72 hours.     Hemodynamics:   PAP: PAP Systolic: 38 ( 1093) CO: CO (l/min): 2.4 l/min (23 0700)   Wedge: PAOP (PCWP) (mmhg): 36 mmHg (23 1830) CI: CI (l/min/m2): 1.5 l/min/m2 (23 0700)   CVP:  CVP (mmHg): 324 mmHg (23 1400) SVR:       PVR:       Ventilator Settings:  Mode Rate Tidal Volume Pressure FiO2 PEEP   Assist control, Volume control   350 ml  5 cm H2O 40 % 5 cm H20     Peak airway pressure: 19 cm H2O    Minute ventilation: 6.43 l/min        MEDS: Reviewed    Chest X-Ray:  CXR Results  (Last 48 hours)                 23 3592  XR CHEST PORT Final result    Impression:      1. ET tube is in satisfactory position. 2. Mild interstitial edema, unchanged. Narrative:  EXAM: XR CHEST PORT       DATE: 3/1/2023 4:59 AM       INDICATION: s/p VAD intubated, impella       COMPARISON: Chest radiograph February 28, 2023       FINDINGS: AP portable chest radiograph. ET tube is in satisfactory position. NG   tube courses to the abdomen and beyond the field-of-view. Patient is status post   sternotomy. There is an LVAD in place. Impella device is stable in position. Subxiphoid drains are noted. LEFT and RIGHT IJ catheters are grossly stable. The   heart size is normal. Vascular clarity is mildly diminished. No definite   effusion or pneumothorax is seen. 02/28/23 0431  XR CHEST PORT Final result    Impression:      Pulmonary edema unchanged. Increased airspace opacity in the right mid and lower   lung zone. Small bilateral pleural effusions. Narrative:  EXAM:  XR CHEST PORT       INDICATION: LVAD. Impella. COMPARISON: 2/27/2020       TECHNIQUE: Semiupright portable chest AP view       FINDINGS: Tubes and lines are stable. Median sternotomy changes. Borderline   heart size. Pulmonary edema. Increased airspace opacity in the right mid and lower lung zone. Small bilateral   pleural effusions. The visualized bones and upper abdomen are age-appropriate. SPECIAL EQUIPMENT  None    DISPOSITION  Stay in ICU    CRITICAL CARE CONSULTANT NOTE  I had a face to face encounter with the patient, reviewed and interpreted patient data including clinical events, labs, images, vital signs, I/O's, and examined patient.   I have discussed the case and the plan and management of the patient's care with the consulting services, the bedside nurses and the respiratory therapist.      NOTE OF PERSONAL INVOLVEMENT IN CARE   This patient has a high probability of imminent, clinically significant deterioration, which requires the highest level of preparedness to intervene urgently. I participated in the decision-making and personally managed or directed the management of the following life and organ supporting interventions that required my frequent assessment to treat or prevent imminent deterioration. I personally spent 30 minutes of critical care time. This is time spent at this critically ill patient's bedside actively involved in patient care as well as the coordination of care and discussions with the patient's family. This does not include any procedural time which has been billed separately.       [facility]

## 2023-03-01 NOTE — PROGRESS NOTES
2000 Bedside shift change report given to [address] (oncoming nurse) by Godfrey Bateman RN (offgoing nurse). Report included the following information SBAR, Kardex, ED Summary, OR Summary, Procedure Summary, Intake/Output, MAR, Accordion, Recent Results, and Cardiac Rhythm 1 AVB . Impella RP and LVAD settings noted. Propofol STOPPED for neuro exam.     2030 Dr Jones Flow at bedside- MAPs currently in 90s and PI is 9.2. Orders given for cardene gtt- high concentration dose ordered. 2115 LVAD alarming for PI event at 11.9. MAPs in 110s. High concentration cardene initiated as ordered. 2120 MAP now 85, PI 7.3.    2140 Neuro exam complete- patient moves all extremities and follows commands; still drowsy. Propofol restarted. 2150 Cardene STOPPED- MAP 64.    2320 Ventilator alarming for increased peak pressures in 50s. RN suctioned and lavaged ETT with some improvement with peak pressures in 30s. RT at bedside- no changes. 0230 IV heparin rate has been adjusted based on the most recent PTT results. SUPRATHERAPEUTIC. Next PTT in 6 hours at approx 0830. Lab Results   Component Value Date/Time    aPTT 79.1 (H) 03/01/2023 01:09 AM     0605 Potassium resulted at 3.2- electrolyte replacement protocol followed. 4485 Dialysis RN at bedside- Nephro informed of IV potassium replacement. OK to continue repletion; prismasol bags changed to 4K.    0730 Bedside shift change report given to [facility] (oncoming nurse) by Dottie Eid RN (offgoing nurse). Report included the following information SBAR, Kardex, ED Summary, OR Summary, Procedure Summary, Intake/Output, MAR, Accordion, Recent Results, and Cardiac Rhythm 1 AVB . _______________________________________________  Problem: Diabetes Self-Management  Goal: *Disease process and treatment process  Description: Define diabetes and identify own type of diabetes; list 3 options for treating diabetes.   Outcome: Progressing Towards Goal  Goal: *Incorporating nutritional management into lifestyle  Description: Describe effect of type, amount and timing of food on blood glucose; list 3 methods for planning meals. Outcome: Progressing Towards Goal  Goal: *Incorporating physical activity into lifestyle  Description: State effect of exercise on blood glucose levels. Outcome: Progressing Towards Goal  Goal: *Developing strategies to promote health/change behavior  Description: Define the ABC's of diabetes; identify appropriate screenings, schedule and personal plan for screenings. Outcome: Progressing Towards Goal  Goal: *Using medications safely  Description: State effect of diabetes medications on diabetes; name diabetes medication taking, action and side effects. Outcome: Progressing Towards Goal  Goal: *Monitoring blood glucose, interpreting and using results  Description: Identify recommended blood glucose targets  and personal targets. Outcome: Progressing Towards Goal  Goal: *Prevention, detection, treatment of acute complications  Description: List symptoms of hyper- and hypoglycemia; describe how to treat low blood sugar and actions for lowering  high blood glucose level. Outcome: Progressing Towards Goal  Goal: *Prevention, detection and treatment of chronic complications  Description: Define the natural course of diabetes and describe the relationship of blood glucose levels to long term complications of diabetes.   Outcome: Progressing Towards Goal  Goal: *Developing strategies to address psychosocial issues  Description: Describe feelings about living with diabetes; identify support needed and support network  Outcome: Progressing Towards Goal  Goal: *Insulin pump training  Outcome: Progressing Towards Goal  Goal: *Sick day guidelines  Outcome: Progressing Towards Goal  Goal: *Patient Specific Goal (EDIT GOAL, INSERT TEXT)  Outcome: Progressing Towards Goal     Problem: Patient Education: Go to Patient Education Activity  Goal: Patient/Family Education  Outcome: Progressing Towards Goal     Problem: Pressure Injury - Risk of  Goal: *Prevention of pressure injury  Description: Document Mike Scale and appropriate interventions in the flowsheet. Outcome: Progressing Towards Goal  Note: Pressure Injury Interventions:  Sensory Interventions: Assess changes in LOC, Discuss PT/OT consult with provider, Float heels, Keep linens dry and wrinkle-free, Minimize linen layers    Moisture Interventions: Apply protective barrier, creams and emollients    Activity Interventions: Pressure redistribution bed/mattress(bed type)    Mobility Interventions: Float heels, HOB 30 degrees or less, Pressure redistribution bed/mattress (bed type), Turn and reposition approx. every two hours(pillow and wedges)    Nutrition Interventions: Document food/fluid/supplement intake    Friction and Shear Interventions: Apply protective barrier, creams and emollients, Feet elevated on foot rest, Minimize layers                Problem: Patient Education: Go to Patient Education Activity  Goal: Patient/Family Education  Outcome: Progressing Towards Goal     Problem: Falls - Risk of  Goal: *Absence of Falls  Description: Document Laverne Fall Risk and appropriate interventions in the flowsheet.   Outcome: Progressing Towards Goal  Note: Fall Risk Interventions:  Mobility Interventions: Communicate number of staff needed for ambulation/transfer         Medication Interventions: Evaluate medications/consider consulting pharmacy    Elimination Interventions: Bed/chair exit alarm    History of Falls Interventions: Evaluate medications/consider consulting pharmacy         Problem: Patient Education: Go to Patient Education Activity  Goal: Patient/Family Education  Outcome: Progressing Towards Goal     Problem: Pain  Goal: *Control of Pain  Outcome: Progressing Towards Goal  Goal: *PALLIATIVE CARE:  Alleviation of Pain  Outcome: Progressing Towards Goal     Problem: Patient Education: Go to Patient Education Activity  Goal: Patient/Family Education  Outcome: Progressing Towards Goal     Problem: Patient Education: Go to Patient Education Activity  Goal: Patient/Family Education  Outcome: Progressing Towards Goal     Problem: Patient Education: Go to Patient Education Activity  Goal: Patient/Family Education  Outcome: Progressing Towards Goal     Problem: Nutrition Deficit  Goal: *Optimize nutritional status  Outcome: Progressing Towards Goal     Problem: Ventilator Management  Goal: *Adequate oxygenation and ventilation  Outcome: Progressing Towards Goal  Goal: *Patient maintains clear airway/free of aspiration  Outcome: Progressing Towards Goal  Goal: *Absence of infection signs and symptoms  Outcome: Progressing Towards Goal  Goal: *Normal spontaneous ventilation  Outcome: Progressing Towards Goal     Problem: Patient Education: Go to Patient Education Activity  Goal: Patient/Family Education  Outcome: Progressing Towards Goal     Problem: Patient Education: Go to Patient Education Activity  Goal: Patient/Family Education  Outcome: Progressing Towards Goal     Problem: LVAD Pre-op Period  Goal: Off Pathway (Use only if patient is Off Pathway)  Outcome: Progressing Towards Goal  Goal: Activity/Safety  Outcome: Progressing Towards Goal  Goal: Consults, if ordered  Outcome: Progressing Towards Goal  Goal: Diagnostic Test/Procedures  Outcome: Progressing Towards Goal  Goal: Nutrition/Diet  Outcome: Progressing Towards Goal  Goal: Medications  Outcome: Progressing Towards Goal  Goal: Treatments/Interventions/Procedures  Outcome: Progressing Towards Goal  Goal: Discharge Planning  Outcome: Progressing Towards Goal  Goal: Psychosocial  Outcome: Progressing Towards Goal  Goal: *Hemodynamically stable (eg: Cardiac output/index; pulmonary arterial pressures; mixed venous oxygen saturation within set parameters)  Outcome: Progressing Towards Goal  Goal: *Labs/tests completed  Outcome: Progressing Towards Goal     Problem: LVAD Pre-Op Day  Goal: Off Pathway (Use only if patient is Off Pathway)  Outcome: Progressing Towards Goal  Goal: Activity/Safety  Outcome: Progressing Towards Goal  Goal: Consults, if ordered  Outcome: Progressing Towards Goal  Goal: Diagnostic Test/Procedures  Outcome: Progressing Towards Goal  Goal: Nutrition/Diet  Outcome: Progressing Towards Goal  Goal: Medications  Outcome: Progressing Towards Goal  Goal: Treatments/Interventions/Procedures  Outcome: Progressing Towards Goal  Goal: Discharge Planning  Outcome: Progressing Towards Goal  Goal: Psychosocial  Outcome: Progressing Towards Goal  Goal: *Vital signs within specified parameters  Outcome: Progressing Towards Goal  Goal: *Consent obtained, permits and diagnostics complete  Outcome: Progressing Towards Goal  Goal: *Confirmation of post discharge primary support person(s)  Outcome: Progressing Towards Goal     Problem: LVAD Day of Surgery (Initiate SCIP measure for post-op care)  Goal: Off Pathway (Use only if patient is Off Pathway)  Outcome: Progressing Towards Goal  Goal: Activity/Safety  Outcome: Progressing Towards Goal  Goal: Consults, if ordered  Outcome: Progressing Towards Goal  Goal: Diagnostic Test/Procedures  Outcome: Progressing Towards Goal  Goal: Nutrition/Diet  Outcome: Progressing Towards Goal  Goal: Medications  Outcome: Progressing Towards Goal  Goal: Respiratory  Outcome: Progressing Towards Goal  Goal: Treatments/Interventions/Procedures  Outcome: Progressing Towards Goal  Goal: Psychosocial  Outcome: Progressing Towards Goal  Goal: *Hemodynamically stable (eg: Cardiac output/index; pulmonary arterial pressures; mixed venous oxygen saturation within set parameters)  Outcome: Progressing Towards Goal  Goal: *Lungs clear or at baseline  Outcome: Progressing Towards Goal  Goal: *Stable cardiac rhythm  Outcome: Progressing Towards Goal  Goal: *Follows simple commands post anesthesia  Outcome: Progressing Towards Goal  Goal: *Optimal pain control at patient's stated goal  Outcome: Progressing Towards Goal  Goal: *LVAD parameters within set limits  Outcome: Progressing Towards Goal     Problem: Nutrition Deficit  Goal: *Optimize nutritional status  Outcome: Progressing Towards Goal

## 2023-03-01 NOTE — PROCEDURES
KIARRA  Date/Time: 3/1/2023 10:50 AM      Procedure Details: probe placement, image aquisition & interpretation    Site marked, Timeout performed  Risks and benefits discussed with the patient and plans are to proceed    Procedure Note    Performed by: Gisselle Jasmine DO  Authorized by: Gisselle Jasmine DO     Indications: assessment of surgical repair  Modalities: 2D  Probe Type: biplane  Insertion: atraumatic  Patient Status: intubated and sedated  Post Intervention Follow-up Study         Valve  Function  Regurgitation  Area    Aortic       Mitral       Tricuspid       Prosthetic          Comments: Imaging for procedural guidance and monitoring only. KIARRA need to assess RV and LV function at time of RVAD wean and removal. Presenting RV function with support mildly suppressed, TAPSE 1.5, with good wall movement except for the apical free wall which was moderately hypokinetic. The presenting LV function was assessed as severely dysfunctional with a visually estimated EF of 30%. Inflow cannula in appropriate position, IVS midline and adequate filling. Post RVAD removal there were no significant changes to biventricular function. Images were unable to be stored to patients chart due to password error on beside KIARRA machine. Findings reviewed and discussed with surgeon at bedside.

## 2023-03-01 NOTE — BRIEF OP NOTE
BRIEF OP NOTE  Pre-Op Diagnosis: RIGHT HEART FAILURE  Post-Op Diagnosis: RIGHT HEART FAILURE    Procedure: Impella RP removal      Surgeon: Ethel Houston MD  Assistant(s): none  Anesthesia: Propofol  Estimated Blood Loss:  5 cc  Specimens: none   Findings: See full operative note.   Complications: none  Implants: none

## 2023-03-01 NOTE — DIABETES MGMT
[facility]  DIABETES MANAGEMENT CONSULT    Consulted by  Liza Centeno MD   for advanced nursing evaluation and care for inpatient blood glucose management. Evaluation and Action Plan   Cale Payne is a 71year old gentleman, with Type 2 Diabetes with a recent A1C of 7.7%, who is admitted with arrhythmias and acute on chronic HFrEF with failure to respond to inotrope support. He was discharged one week prior from a prolonged hospital stay for acute on chronic HF and LVAD work-up and discharged home on dobutamine with a lifevest.  Glucose control was tenuous during his previous admission s/t multiple co-morbidities, several tests/procedures requiring NPO status, waxing and waining oral intake. At this time glucose is impacted by:  Heart Failure with reduced EF 25-30%, LVAD implant and RVAD  Vasopressor use: weaning  TANNER on CVVHD  Enteral Feeds    This admission, he required low basal doses but high bolus doses with meals to offset pre-prandial hyperglycemia. An insulin gtt has been used for glycemic control post-operatively and insulin rates have been erratic over the last 2 weeks. His insulin rates have been stable over the last 4 days transitioned off to SQ basal/correction insulin. BG target is now at goal of 140-180mg/dl. Action Plan    Reduced NPH to 16 units Q12 as BG trended below goal. (20% dose reduction)    Humalog at high sensitivity Q4h            Initial Presentation   Cale Payne is a 71 y.o. male who presented to the ED 1/26/23 with lower extremity swelling and difficulty breathing. He had a prolonged hospital admission from 12/22/22-1/19/23 for acute on chronic systolic HF and LVAD work-up. He was discharged with home inotrope. LAB: , GFR 58, Trop 2003, BNP 4524  CXR: New diffuse bilateral increased interstitial markings, partially obscuring bilateral pulmonary nodules.      HX:   Past Medical History:   Diagnosis Date    CAD (coronary artery disease) 11/10/2016    NSTEMI & 2 stents    Deafness 10/28/2012    DM (diabetes mellitus) ([facility]     Elevated cholesterol     Hypertension     NSTEMI (non-ST elevated myocardial infarction) ([facility] 11/10/2016        INITIAL DX:   CHF (congestive heart failure) ([facility] [I50.9]     Current Treatment     TX: Milrinone, Amio. Specialty consultations: [facility], Cardiology, EP, GI     Hospital Course   Clinical progress has been complicated by:    8/24: Admission. Rapid response for SVT- Given adenosine x2, amio bolus/gtt  1/27: Advanced HFC consult. EP consult ordered. Intolerance of inotropic support. Cardiology consult. NSTEMI, heparin gtt started. Seen by EP: Continue amio. 1/28: Febrile: CT chest 1/28 shows diffuse focal opacities concerning for pneumonia. Obtain blood cultures, UA. Pathology report 1/18/23: well differentiated neuroendocrine tumor grade 1. EGD: Patchy erythema gastric body, biopsies done. 6 mm sessile polyp gastric body biopsied  1/30: Oncology consult: Recommend multiphase CT of the abdomen and pelvis to evaluate for metastatic spread. Tachycardia and SOB, BiPAP overnight. 2/3: Accepted for VAD implant if renal fx improves per [facility]. CCU Transfer and swan placed  2/4: PRBC transfusion   2/6: With increased dyspnea. Doubtamine started  2/15: LVAD Implant  2/17: Extubated. CRRT started. ECHO with persistent RV failure. OR for RV impella. Remained Intubated post-op  2/20: UA with large leuk esterase and 2+ bacteria.  Urine Cx with yeast  2/21: PRBC transfusion, PLT transfusion    2/22: Bronch   Diabetes History   Type 2 Diabetes  Ambulatory BG management provided by: PCP Moni Soria MD    Diabetes-related Medical History  Acute complications  Acute hyperglycemia  Neurological complications  Peripheral neuropathy  Microvascular disease  Nephropathy  Macrovascular disease  CAD  Other associated conditions                                       CHF     Diabetes Medication History  Key Antihyperglycemic Medications        Diabetes Medication History  Key Antihyperglycemic Medications               metFORMIN (GLUCOPHAGE) 500 mg tablet (Taking/) Take 1 Tablet by mouth two (2) times daily (with meals) for 30 days. glipiZIDE (GLUCOTROL) 5 mg tablet (Taking) Take 1 tablet by mouth twice daily    Januvia 50 mg tablet (Taking) Take 1 tablet by mouth once daily             Diabetes self-management practices:   Eating pattern                Eats 3 small meals daily  [x]         Breakfast                         2 Eggs, Coffee  [x]         Lunch                               [place]  [x]         Dinner                              \" Food\" Bread, rice, lentils   [x]         Bedtime                           [place]  [x]         Snacks                              Afternoon snack  [x]         Beverages                        Water, Coffee  Physical activity pattern                Sedentary   Monitoring pattern  Discharged last week with a [place] CGM and serum glucometer  7 day average Ba-9a: 129-164  9a-12: 243  Noon to 9p: 198-238  1 low BG in the last week overnight    Taking medications pattern  [x]         Consistent administration  [x]         Affordable  Social determinants of health impacting diabetes self-management practices   Concerned that you need to know more about how to stay healthy with diabetes  Overall evaluation:                 [x]         Achieving A1c target with drug therapy & self-care practices    Subjective     Objective   Physical exam  General Underweight [place] ([facility]) male in critical condition in CVICU. Intubated. LVAD. RV Impella, CVVHD  Neuro  Sedated   Vital Signs Visit Vitals  BP (!) 109/57   Pulse 82   Temp 98.2 °F (36.8 °C)   Resp 18   Ht 5' 6\" (1.676 m)   Wt 61.2 kg (134 lb 14.7 oz)   SpO2 97%   BMI 21.78 kg/m²     Skin  Warm and dry. No acanthosis noted along neckline. Sternal surgical incision. Chest tubes. LVAD  Heart   Regular rate and rhythm.  No murmurs, rubs or gallops  Lungs  Clear to auscultation without rales or rhonchi  Extremities No foot wounds        Laboratory  Recent Labs     23  1211 23  0437 23  1614 23  0339 23  1835 23  0312 23  0311   GLU  --  133* 122* 174*   < >  --  158*   AGAP  --  9 7 7   < >  --  6   TRIGL 218*  --   --   --   --   --   --    WBC  --  8.4  --  8.8  --  8.9  --    CREA  --  1.59* 1.61* 1.64*   < >  --  1.63*   AST  --  130*  --  105*  --   --  96*   ALT  --  52  --  37  --   --  31    < > = values in this interval not displayed.          Factors impacting BG management  Factor Dose Comments   Nutrition:  Standard meals     Enteral feeds  Vital 1.5 @ 40 ml/hr  165g CHO/24h      Heart Failure/Cardiogenic Shock HeartMate 3 LVAD  Dobutamine: Off  Epinephrine weaning  Norepi: Off  RVAD off    Lactic Acidosis Resolved    Septic Shock UTI/PNA  Urine Cx  with yeast  IV antibiotics   Fevers    Other:   Kidney function TANNER on CKD:   Current GFR 46  CVVHD    Shock Liver LFTs- improving      Blood glucose pattern    Significant diabetes-related events over the past 24-72 hours  A1C 7.7% 23  Pre-op: Basal: Lantus 6 units daily and Bolus: 10 units Humalog/meal  Insulin gtt:   2/15: 2.8 units  : 32.8 units  : 23.1 units  18: 19.9 units  : 8.7 units  : 53.6 units  : 99 units   : 94.9 units  : 69.1 units  : 46.2  : 57.5 units  : 48 units  : 42 units since MN    Gtts: Epi (2)  Fasting B  NPH: 20 units Q12- weaned to 16 units Q12  Correction: 0 units in the last 24h  Off insulin gtt: -169  RVAD weaned off    Assessment and Nursing Intervention   Nursing Diagnosis Risk for unstable blood glucose pattern   Nursing Intervention Domain 6693 Decision-making Support   Nursing Interventions Examined current inpatient diabetes/blood glucose control   Explored factors facilitating and impeding inpatient management  Explored corrective strategies with patient and responsible inpatient provider   Informed patient of rational for insulin strategy while hospitalized     Billing Code(s)   No charge    Before making these care recommendations, I personally reviewed the hospitalization record, including notes, laboratory & diagnostic data and current medications, and examined the patient at the bedside (circumstances permitting) before determining care. More than fifty (50) percent of the time was spent in patient counseling and/or care coordination.   Total minutes: 13    SLICK Krishnamurthy  Diabetes Clinical Nurse Specialist  [facility]  Access via [employer]

## 2023-03-01 NOTE — PROGRESS NOTES
NUTRITION brief    Nutrition Recommendations/Plan:      Continue TF via NG of Vital 1.5 @ 40 mL/hr with 1 packet Prosource TID and flushes of 30 mL H2O q 4 hours       Diet: NPO  Nutrition Support: TF via NG Vital 1.5 @ 40 mL/hr with 1 packet Prosource TID and flushes of 30 mL h2o q 4 hours  Nutrition-related meds: buminate, precedex, epi @ 3, epo, diflucan, hydromorphone gtt, SSI, NPH 16 units BID, flagyl, cardene gtt, zosyn, propofol, vancomycin, IV sodium Phos to start, s/p KCl  Last BM: 23, Soft      Mid-week check: pt remains intubated, on CRRT. Impella removed today. Propofol was resumed on , but only running @ 5.9 mL/hr which provides 156 kcal.  TF as ordered providing 880 mL, 1500 kcal, 104 gm pro, 165 gm CHO, 669+270+064=4027 mL free H2o. With propofol = 1656 kcal which meets 104% kcal needs. Off levo, remains on epi. Insulin gtt off - now on NPH BID. BG within target range. Low K+ and Phos, repletion ordered. Having soft BMs. Given malnutrition and overall more stable/ less pressors, current TF regimen remains appropriate. Ve observed 6.43 l/min    Temp (24hrs), Av.8 °F (33.8 °C), Min:76.3 °F (24.6 °C), Max:100.6 °F (38.1 °C)    See full RD assessment from  for additional details, goals, and monitoring/evaluation.    Estimated Nutrition Needs:   Energy: 6716-0735 (25-30 kcal/kg; 1430 kcal/day using [employer] )  Wt used: Admission (54.4 kg)  Protein: 109 (2g/kg)  Wt used: Admission   Fluid: 500 mL + OP     Recent Labs     23  0437 23  1614 23  0339 23  1835   * 122* 174* 105*   BUN 19 21* 25* 23*   CREA 1.59* 1.61* 1.64* 1.51*    138 136 138   K 3.2* 3.6 4.0 4.0    106 104 106   CO2 24 25 25 26   CA 10.8* 10.3* 9.7 9.3   PHOS 2.2* 2.4* 2.8 3.3   MG 2.5* 2.6* 2.8* 3.0*       Recent Labs     23  1210 23  3668 23  0436 23  0108 23  1918 23  1603 23  1132 23  0609 02/28/23  0336 02/27/23  2342 02/27/23  2341 02/27/23  1941 02/27/23  1834 02/27/23  1741 02/27/23  1637   GLUCPOC 147* 169* 118* 136* 144* 126* 119* 133* 162* 161* 198* 60* 94 103 103 105         Annie Salvador, RD  Available via [employer]

## 2023-03-01 NOTE — PROGRESS NOTES
Placed call to [facility], provided clinical update and encouraged them to verbalize. They stated they will be in tomorrow around 11:00, will update Dr Evia Heimlich.

## 2023-03-01 NOTE — PROGRESS NOTES
[facility]  PROGRESS NOTE        Subjective:     Remains Intubated. On CRRT. Impella  removal  Objective:   VITALS SIGNS:    Visit Vitals  BP (!) 109/57 Comment: ARTERIAL LINE READING   Pulse 92   Temp 98.8 °F (37.1 °C)   Resp 18   Ht 5' 6\" (1.676 m)   Wt 61.2 kg (134 lb 14.7 oz)   SpO2 96%   BMI 21.78 kg/m²       O2 Device: Endotracheal tube, Heated, Ventilator   O2 Flow Rate (L/min): 20 l/min   Temp (24hrs), Av.4 °F (37.4 °C), Min:98.4 °F (36.9 °C), Max:100.8 °F (38.2 °C)         PHYSICAL EXAM:  Intubated  +ETT  + LE edema      DATA REVIEW:     INTAKE / OUTPUT:   Last shift:       07 -  190  In: 495.7 [I.V.:492.7]  Out: 509.3 [Drains:30]  Last 3 shifts: 1901 -  0700  In: 5637.9 [I.V.:3876.6]  Out: 4544.4 [Drains:220]    Intake/Output Summary (Last 24 hours) at 3/1/2023 1200  Last data filed at 3/1/2023 1100  Gross per 24 hour   Intake 3317.5 ml   Output 3020 ml   Net 297.5 ml           LABS:   Recent Labs     23  0437 23  1614 23  0339 23  0312   WBC 8.4  --  8.8 8.9   HGB 8.9* 8.7* 7.0* 7.3*   HCT 27.3* 27.2* 22.8* 24.2*     --  171 166       Recent Labs     23  0437 23  1614 23  0339 23  1835 23  0311    138 136   < > 138   K 3.2* 3.6 4.0   < > 4.7    106 104   < > 106   CO2 24 25 25   < > 26   * 122* 174*   < > 158*   BUN 19 21* 25*   < > 22*   CREA 1.59* 1.61* 1.64*   < > 1.63*   CA 10.8* 10.3* 9.7   < > 9.3   MG 2.5* 2.6* 2.8*   < > 3.3*   PHOS 2.2* 2.4* 2.8   < > 2.4*   ALB 3.7  --  3.6  --  3.9   TBILI 15.0*  --  13.7*  --  14.4*   ALT 52  --  37  --  31   INR 1.5*  --  1.6*  --  1.9*    < > = values in this interval not displayed. Assessment:  TANNER on CKD-3a: Suspect cardiorenal effects with needed diuresis. Now has LVAD and  POST OP ATN. Anuric->Requiring CRRT   hypercalcemia  Ischemic CM: Recurrent exacerbations. EF 20%. S/p LVAD on 2/15.  Required Impella RP for RV support-> attempts to wean not tolerated by patient. Sepsis: Urosepsis-> growing candida    BPH     Well differentiated NET Grade 1: noted on EGD 1/18/23     Anemia 2 to CKD:  s/p PRBCS      Left UE basilic and radial vein thrombus    Thrombocytopenia       Plan/Recommendations:  Seen on CVVH .   Factor rate->  0  Switch to 4 K Prismasol bags    IV  Phos     Daily CRRT labs   Phos better  BRIGHT  PRBC as per cardiac team    Discussed with RN  Hunter medina MD  . [facility]

## 2023-03-01 NOTE — PROGRESS NOTES
Critical Care Note      Cardiac surgery was called for the evaluation and management of: Stage D heart failure, cardiogenic shock      The critical nature of the patient's condition includes the following: the patient is at imminent risk of clinical deterioration and death      Critical care diagnoses that are being treated:     Cardiogenic shock   RV failure     Cardiogenic Shock   Remains intubated and sedated  Lactic acid 1.6  Creatinine 1.7  LFTs 100's  NT pro-BNP 13K     RV failure  RVAD removed  Continues on Epi 3     Renal failure   Continues on CVVH  Re-starting factor             Intake/Output Summary (Last 24 hours) at 3/1/2023 1110  Last data filed at 3/1/2023 1059  Gross per 24 hour   Intake 3877.82 ml   Output 3078 ml   Net 799.82 ml      Visit Vitals  BP (!) 109/57 Comment: ARTERIAL LINE READING   Pulse 80   Temp 98.8 °F (37.1 °C)   Resp 18   Ht 5' 6\" (1.676 m)   Wt 134 lb 14.7 oz (61.2 kg)   SpO2 99%   BMI 21.78 kg/m²      CXR Results  (Last 48 hours)                 03/01/23 0459  XR CHEST PORT Final result    Impression:      1. ET tube is in satisfactory position. 2. Mild interstitial edema, unchanged. Narrative:  EXAM: XR CHEST PORT       DATE: 3/1/2023 4:59 AM       INDICATION: s/p VAD intubated, impella       COMPARISON: Chest radiograph February 28, 2023       FINDINGS: AP portable chest radiograph. ET tube is in satisfactory position. NG   tube courses to the abdomen and beyond the field-of-view. Patient is status post   sternotomy. There is an LVAD in place. Impella device is stable in position. Subxiphoid drains are noted. LEFT and RIGHT IJ catheters are grossly stable. The   heart size is normal. Vascular clarity is mildly diminished. No definite   effusion or pneumothorax is seen. 02/28/23 0861  XR CHEST PORT Final result    Impression:      Pulmonary edema unchanged. Increased airspace opacity in the right mid and lower   lung zone.  Small bilateral pleural effusions. Narrative:  EXAM:  XR CHEST PORT       INDICATION: LVAD. Impella. COMPARISON: 2/27/2020       TECHNIQUE: Semiupright portable chest AP view       FINDINGS: Tubes and lines are stable. Median sternotomy changes. Borderline   heart size. Pulmonary edema. Increased airspace opacity in the right mid and lower lung zone. Small bilateral   pleural effusions. The visualized bones and upper abdomen are age-appropriate. LVAD   Pump Speed (RPM): 4600  Pump Flow (LPM): 2.8  MAP: 70 (L brachial doppler)  PI (Pulsitility Index): 9.3  Power: 3.1   Test: Yes  Back Up  at Bedside & Labeled: Yes  Power Module Test: Yes  Driveline Site Care: No  Driveline Dressing: Clean, Dry, and Intact  Testing  Alarms Reviewed: Yes  Back up SC speed: 4600  Back up Low Speed Limit: 4200  Emergency Equipment with Patient?: Yes  Emergency procedures reviewed?: Yes  Drive line site inspected?: No  Drive line intergrity inspected?: Yes  Drive line dressing changed?: No    I personally spent the above critical care time. This is time spent at this critically ill patient's bedside / unit / floor actively involved in patient care as well as the coordination of care. This does not include any procedural time which has been billed separately. This does not include any NP/PA patient care time. I had a face to face encounter with the patient, reviewed and interpreted patient data including clinical events, labs, images, vital signs, I/O's, and examined patient. I have discussed the case and the plan and management of the patient's care with the consulting services and bedside nurses. This patient has a high probability of imminent, clinically significant deterioration, which requires the highest level of preparedness to intervene urgently.  I participated in the decision-making and personally managed or directed the management of life and organ supporting interventions to treat or prevent imminent deterioration. This patient has a critical illness or injury that is acutely impairing one or more vital organ systems such that there is a high probability of imminent or life threatening deterioration in the patient's condition. This patient requires high complexity medical decision making to assess, manipulate, and support vital organ system failure. After stabilization, this patient still requires management to prevent further life / organ threatening deterioration.

## 2023-03-01 NOTE — PROGRESS NOTES
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 Marianela Mathis  C/b RV failure requiring Impella RP placed 23 and discontinued 3/1/23  C/b acute on chronic renal failure, requiring CRRT  C/b hepatic failure, TB 15.1 (peak 15.3)  C/b lactic acidosis, resolved  C/b persistent septic shock c/b vasodilation and high outputs, on multiple antibiotics, procalcitonin improving, still elevated 2.1  Patient was approved for LVAD implantation as destination therapy at [facility] 2/3/23; the following have been identified and d/w patient as relative concerns pertaining to LVAD candidacy: small body surface area, cachexia, BMI 18, malnutrition, frailty, advanced age, muscular deconditioning, PVD (fingertips), urinary retention requiring donnelly catheter, neuroendocrine tumor class 1 requiring treatment after LVAD, mild neurocognitive dysfunction, chronic kidney disease and hearing loss requiring hearing aid  Family meeting with AHF team for updates and next steps planned this afternoon around 3pm        RECOMMENDATIONS:  Continue LVAD at 4500rpm  Continue epi at 3  Continuous NICOMs; change patches every other day  Goals today is to keep CVP 12-14  No beta-blockers due to hypotension and RV conditioning prior to LVAD  Cannot tolerate ARB/ARNi due to hypotension and upcoming surgical procedure   Cannot tolerate spironolactone due to hyperkalemia  Cannot tolerate jardiance due to significant diuresis on smallest dose/contributed to IVVD  Previously discontinued corlanor due to SVT  Digoxin stopped d/t risk for toxicity with amio loading  Discontinued amio due to intermitted heart blocks under pacemaker  Nephrology consult appreciated, cont CRRT   Keep K+ >4 and Mg >2  Transfuse one unit pRBC today to keep hgb > 7  Continue antibiotics, zosyn, vanc, flagyl and diflucan  Continue colchicine 0.6mg daily for possible pericarditis  Hold ASA d/t thrombocytopenia- improving   Continue coumadin 1mg now and 1mg in PM, INR goal 2-3 and heparin gtt  HIT panel negative   Continue TF   Continue bowel regimen   Daily dressing changes to Drive line exit site  Pulmonary hygiene   Timing of extubation per intensivist- would cont SBT for now  D/w intensivist and bedside staff      INTERVAL HISTORY:  No issues overnight  RVAD removed  MAPs 70s, HR 70-90s  CVP 8  Epi 3, levo off, precedex gtt  I/O net positive 724, urine 3cc, drains 150cc  Hgb 8.9  INR 1.5, aPTT 66  Cr 1.59 on CRRT  TB 15 from 13.7 from 14.4, peak 15.3  Lactic 1.7  Procalc 2.61 from 2.66 from 3.14 from 3.55 from 4.24     IMPRESSION:  Acute on chronic combined systolic/diastolic  heart failure  Stage D, NYHA class IV symptoms   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, LVEF 20% (by echo 1/2023) and 23% (by cMRI 1/3/23)  Cardiac MRI suggestive of ischemic cardiomyopathy  PYP equivocal  S/p HeartMate 3  LVAD-DT (2/15/23)  C/b RV failure requiring Impella RP (2/18/23)  C/b franco on CKD requiring CRRT  C/b liver dysfunction (ischemic vs congestive)  Coronary artery disease  CAD s/p CABG x 2: further disease best managed medically due to small vessel size   At risk of sudden cardiac death  Peripheral arterial disease  Bilateral hydronephrosis s/p donnelly  Cardiac risk factors:  HTN  HL  TRISTAN, STOP-BANG 4  DM2  CKD, stage 3  MOCA from 2/9 19/30, consistent with mild cognitive impairment  Hard of hearing  Gastric Neuroendocrine Tumor, elevated gastrin and chromogranin A  Septic shock, source pending         LIFE GOALS:  Lifestyle goals reviewed with the patient.   Patient's personal goals include: having a few more years with family  Important upcoming milestones or family events: None at this time   The patient identifies the following as important for living well: being at home, not being SOB            CARDIAC IMAGING:   Echo 2/19/23    Left Ventricle: Severely reduced left ventricular systolic function with a visually estimated EF of 20 - 25%. Not well visualized. Left ventricle size is normal. Increased wall thickness. Unable to assess wall motion. Abnormal diastolic function. LVAD is present. LVAD inflow cannula was not well visualized. Right Ventricle: Not well visualized. Right ventricle is mildly dilated. Moderately reduced systolic function. TAPSE is abnormal.    Mitral Valve: Severe annular calcification at the anterior and posterior leaflets of the mitral valve. Mild regurgitation. Left Atrium: Left atrium is dilated. Right Atrium: Right atrium is dilated. Technical qualifiers: Echo study was technically difficult and technically difficult with poor endocardial visualization. Echo 1/27/23    Left Ventricle: EF by visual approximation is 20%. Left ventricle is mildly dilated. Mitral Valve: Moderately thickened leaflet, at the anterior and posterior leaflets. Moderately calcified leaflet. Moderate regurgitation. Left Atrium: Left atrium is moderately dilated. Technical qualifiers: Echo study was technically difficult with poor endocardial visualization. Contrast used: Definity. Limited study, not all structures viewed     Echo 1/9/23   Left Ventricle: Severely reduced left ventricular systolic function with a visually estimated EF of 25 - 30%. Left ventricle size is normal. Mildly increased wall thickness. Echo 12/26/22    Left Ventricle: Severely reduced left ventricular systolic function with a visually estimated EF of 25 - 30%. Left ventricle size is normal. Normal wall thickness. There are regional wall motion abnormalities.  Grade II diastolic dysfunction with increased LAP. Right Ventricle: Moderately reduced systolic function. TAPSE is abnormal. TAPSE is 1.1 cm. Aortic Valve: Mild stenosis of the aortic valve. AV peak gradient is 13 mmHg. AV peak velocity is 1.8 m/s. Mitral Valve: Not well visualized. Moderate annular calcification at the posterior leaflet of the mitral valve. Mild to moderate regurgitation. Tricuspid Valve: Mildly elevated RVSP. Left Atrium: Left atrium is moderately dilated. 12/8/22    Left Ventricle: Moderately reduced left ventricular systolic function with a visually estimated EF of 35 - 40%. Severe hypokinesis of the following segments: mid anteroseptal, apical anterior, apical septal, apical inferior and apical lateral. Severe hypokinesis of the apex. Mitral Valve: Severely thickened leaflet, at the anterior and posterior leaflets. Severely calcified leaflet, at the anterior and posterior leaflets. Mild annular calcification of the mitral valve. Moderate regurgitation. Left Atrium: Left atrium is mildly dilated. Contrast used: Definity. limited study     EKG 12/22/22 ST, Biatria enlargement, marked ST abnormality     [facility] 12/6/22  1. Normal LVEDP  2. Severe native multivessel coronary artery disease  3. Patent LIMA to LAD and vein graft to distal RCA  4. Recurrent ISR in OM1 stent with now 60 to 70% restenosis  5. Recoil of left main and circumflex stent with now recurrent 40 to 50% stenosis. 6.  Progression of ostial left main disease now to about 60% stenosis  7. Progression of disease in jailed first marginal branch now with diffuse 90% stenosis  8.   High-grade stenosis in the mid to distal right potential femoral artery treated with 6 x 40 mm impact drug-coated balloon angioplasty to reduce the stenosis to less than 40%        HEMODYNAMICS:  RHC 1/9/23  PA 20/9, RA 3, PCWP 8, CI 1.8     CPEST too ill   6MW 300 feet    LVAD INTERROGATION:  Device interrogated in person  Device function normal, normal flow, no events  LVAD   Pump Speed (RPM): 4600  Pump Flow (LPM): 2.8  MAP: 70 (L brachial doppler)  PI (Pulsitility Index): 9.3  Power: 3.1   Test: Yes  Back Up  at Bedside & Labeled: Yes  Power Module Test: Yes  Driveline Site Care: No  Driveline Dressing: Clean, Dry, and Intact  Testing  Alarms Reviewed: Yes  Back up SC speed: 4600  Back up Low Speed Limit: 4200  Emergency Equipment with Patient?: Yes  Emergency procedures reviewed?: Yes  Drive line site inspected?: No  Drive line intergrity inspected?: Yes  Drive line dressing changed?: No    PHYSICAL EXAM:  Visit Vitals  BP (!) 109/57 Comment: ARTERIAL LINE READING   Pulse 92   Temp 98.8 °F (37.1 °C)   Resp 18   Ht 5' 6\" (1.676 m)   Wt 134 lb 14.7 oz (61.2 kg)   SpO2 96%   BMI 21.78 kg/m²     Physical Exam  Vitals and nursing note reviewed. Constitutional:       Appearance: He is ill-appearing. Interventions: He is sedated and intubated. Cardiovascular:      Rate and Rhythm: Normal rate and regular rhythm. Comments: LVAD Humm noted on auscultation   Pulmonary:      Effort: Pulmonary effort is normal. No respiratory distress. He is intubated. Breath sounds: Rhonchi present. Comments: Coarse lung sounds  Abdominal:      General: Bowel sounds are decreased. There is no distension. Comments: Driveline exit site with dressing C/D/I   Musculoskeletal:      Right lower le+ Pitting Edema present. Left lower le+ Pitting Edema present. Comments: Left groin Impella in place, dressing C/D/I   Skin:     Capillary Refill: Capillary refill takes more than 3 seconds. Coloration: Skin is jaundiced.          REVIEW OF SYSTEMS:  Review of Systems   Unable to perform ROS: Intubated    PAST MEDICAL HISTORY:  Past Medical History:   Diagnosis Date    CAD (coronary artery disease) 11/10/2016    NSTEMI & 2 stents    Deafness 10/28/2012    DM (diabetes mellitus) (HCC)     Elevated cholesterol Hypertension     NSTEMI (non-ST elevated myocardial infarction) ([facility] [address] 11/10/2016       PAST SURGICAL HISTORY:  Past Surgical History:   Procedure Laterality Date    COLONOSCOPY N/A 6/28/2018    COLONOSCOPY performed by Merrill Kilgore MD at [facility] ENDOSCOPY    COLONOSCOPY N/A 1/18/2023    COLONOSCOPY performed by Severo Foot, MD at [facility] ENDOSCOPY    [address]  11/11/2016    2 stents       FAMILY HISTORY:  Family History   Problem Relation Age of Onset    Heart Disease Father     Heart Attack Father     Hypertension Mother     Elevated Lipids Brother     Elevated Lipids Brother     No Known Problems Sister     Elevated Lipids Brother     No Known Problems Son     No Known Problems Daughter     Anesth Problems Neg Hx        SOCIAL HISTORY:  Social History     Socioeconomic History    Marital status:    Tobacco Use    Smoking status: Never     Passive exposure: Never    Smokeless tobacco: Never   Vaping Use    Vaping Use: Never used   Substance and Sexual Activity    Alcohol use: Yes     Alcohol/week: 2.0 standard drinks     Types: 1 Cans of beer, 1 Shots of liquor per week     Comment: rarely    Drug use: No    Sexual activity: Yes     Social Determinants of Health     Financial Resource Strain: Medium Risk    Difficulty of Paying Living Expenses: Somewhat hard   Food Insecurity: Food Insecurity Present    Worried About Running Out of Food in the Last Year: Never true    Ran Out of Food in the Last Year: Often true       LABORATORY RESULTS:     Labs Latest Ref Rng & Units 3/1/2023 2/28/2023 2/28/2023 2/27/2023 2/27/2023 2/27/2023 2/26/2023   WBC 4.1 - 11.1 K/uL 8.4 - 8.8 - 8.9 - 8.1   RBC 4.10 - 5.70 M/uL 2.92(L) - 2.31(L) - 2.52(L) - 2.69(L)   Hemoglobin 12.1 - 17.0 g/dL 8.9(L) 8.7(L) 7. 0(L) - 7. 3(L) - 7. 5(L)   Hematocrit 36.6 - 50.3 % 27. 3(L) 27. 2(L) 22. 8(L) - 24. 2(L) - 25. 3(L)   MCV 80.0 - 99.0 FL 93.5 - 98.7 - 96.0 - 94.1   Platelets 240 - 083 K/uL 201 - 171 - 166 - 134(L)   Lymphocytes 12 - 49 % 12 - 8(L) - - - 11(L)   Monocytes 5 - 13 % 11 - 10 - - - 14(H)   Eosinophils 0 - 7 % 6 - 4 - - - 8(H)   Basophils 0 - 1 % 1 - 1 - - - 1   Albumin 3.5 - 5.0 g/dL 3.7 - 3.6 - - 3.9 3.7   Calcium 8.5 - 10.1 MG/DL 10. 8(H) 10. 3(H) 9.7 9.3 - 9.3 8.7   Glucose 65 - 100 mg/dL 133(H) 122(H) 174(H) 105(H) - 158(H) 159(H)   BUN 6 - 20 MG/DL 19 21(H) 25(H) 23(H) - 22(H) 25(H)   Creatinine 0.70 - 1.30 MG/DL 1.59(H) 1.61(H) 1.64(H) 1.51(H) - 1.63(H) 1.56(H)   Sodium 136 - 145 mmol/L 137 138 136 138 - 138 139   Potassium 3.5 - 5.1 mmol/L 3.2(L) 3.6 4.0 4.0 - 4.7 4.5   TSH 0.36 - 3.74 uIU/mL - - - - - - -   PSA 0.01 - 4.0 ng/mL - - - - - - -   LDH 85 - 241 U/L 612(H) - 582(H) - - 594(H) 586(H)   Some recent data might be hidden     Lab Results   Component Value Date/Time    TSH 3.52 01/28/2023 05:26 AM    TSH 2.12 12/27/2022 02:36 PM    TSH 4.80 (H) 12/06/2022 03:53 AM    TSH 5.39 (H) 10/12/2022 09:10 AM    TSH 3.53 02/03/2022 11:47 AM    TSH 5.790 (H) 11/21/2019 04:45 PM    TSH 3.08 06/22/2018 01:53 PM    TSH 4.250 05/26/2015 09:43 AM       ALLERGY:  Allergies   Allergen Reactions    Ambien [Zolpidem] Other (comments)     Causes extreme confusion        CURRENT MEDICATIONS:    Current Facility-Administered Medications:     bicarbonate dialysis (PRISMASOL) BG K 4/Ca 2.5 5000 ml solution, , Extracorporeal, DIALYSIS CONTINUOUS, Kelly Schultz MD, Last Rate: 1,750 mL/hr at 03/01/23 1144, New Bag at 03/01/23 1144    insulin NPH (NOVOLIN N, HUMULIN N) injection 16 Units, 16 Units, SubCUTAneous, Q12H, Cathy Sheldon, CNS    PHENYLephrine (NEOSYNEPHRINE) 0.4 mg/10 mL (40 mcg/mL) in NS syringe, , , ,     epoetin heidy-epbx (RETACRIT) injection 10,000 Units, 10,000 Units, SubCUTAneous, Q TUE, THU & SAT, Antoine, Shilpa HA MD, 10,000 Units at 02/28/23 2105    EPINEPHrine (ADRENALIN) 10 mg in 0.9% sodium chloride 250 mL infusion, 0-10 mcg/min, IntraVENous, TITRATE, Sera Johnson MD    amiodarone (CORDARONE) 900 mg/250 ml D5W infusion, 0.5-1 mg/min, IntraVENous, TITRATE, Samia Johnson MD    NOREPINephrine (LEVOPHED) 32,000 mcg in dextrose 5% 250 mL (128 mcg/mL) infusion, 0.5-16 mcg/min, IntraVENous, TITRATE, Samia Johnson MD    vasopressin (VASOSTRICT) 20 Units in 0.9% sodium chloride 100 mL infusion, 0-0.04 Units/min, IntraVENous, TITRATE, Samia Johnson MD    vancomycin (VANCOCIN) 1,000 mg in sodium chloride irrigation 0.9 % 500 mL irrigation, , Irrigation, ONCE, Samia Johnson MD    PHENYLephrine (JONELLE-SYNEPHRINE) 100 mg in 0.9% sodium chloride 250 mL infusion,  mcg/min, IntraVENous, TITRATE, Samia Johnson MD    niCARdipine (CARDENE) 50 mg in 0.9% sodium chloride 100 mL infusion, 0-15 mg/hr, IntraVENous, TITRATE, Jeffy Jaeger DO, Stopped at 03/01/23 0924    dextrose 10 % infusion 0-250 mL, 0-250 mL, IntraVENous, PRN, Cathy Sheldon CNS    insulin lispro (HUMALOG) injection, , SubCUTAneous, Q4H, Cathy Sheldon CNS, 2 Units at 02/28/23 0833    propofol (DIPRIVAN) 10 mg/mL infusion, 0-50 mcg/kg/min, IntraVENous, TITRATE, Duane Sousa, MD, Last Rate: 5.9 mL/hr at 03/01/23 0800, 15 mcg/kg/min at 03/01/23 0800    HYDROmorphone 30 mg/30 mL (1 mg/mL) infusion, 0.5-1 mg/hr, IntraVENous, CONTINUOUS, Samia Johnson MD, Last Rate: 1 mL/hr at 03/01/23 0802, 1 mg/hr at 03/01/23 0802    dextrose (D50W) injection syrg 25 g, 25 g, IntraVENous, PRN, Iva Saavedra MD, 9 mL at 02/25/23 1225    neomycin-polymyxin-dexamethasone (DEXACINE) 3.5 mg/g-10,000 unit/g-0.1 % ophthalmic ointment, , Both Eyes, BID, Lisa Chavez MD, Given at 03/01/23 0914    NOREPINephrine (LEVOPHED) 8 mg in 5% dextrose 250mL (32 mcg/mL) infusion, 0.5-16 mcg/min, IntraVENous, TITRATE, Samia Johnson MD, Last Rate: 1.9 mL/hr at 02/28/23 1149, 1 mcg/min at 02/28/23 1149    alteplase (CATHFLO) 3 mg in dextrose 5% 50 mL impella purge solution, 3 mg, Other, TITRATE, Johnny Orozco MD, Stopped at 03/01/23 1055    colchicine tablet 0.6 mg, 0.6 mg, Oral, DAILY, Shaila, Lizette B, NP, 0.6 mg at 02/28/23 0825    fluconazole (DIFLUCAN) 400mg/200 mL IVPB (premix), 400 mg, IntraVENous, Q24H, Lalo Lintonin LUÍS NP, Last Rate: 100 mL/hr at 02/28/23 1020, 400 mg at 02/28/23 1020    vancomycin (VANCOCIN) 750 mg in 0.9% sodium chloride 250 mL (Hiss2Aho), 750 mg, IntraVENous, Q24H, Walker Aponte MD, Last Rate: 250 mL/hr at 02/28/23 1424, 750 mg at 02/28/23 1424    piperacillin-tazobactam (ZOSYN) 3.375 g in 0.9% sodium chloride (MBP/ADV) 100 mL MBP, 3.375 g, IntraVENous, Q8H, Walker Aponte MD, Last Rate: 25 mL/hr at 03/01/23 0514, 3.375 g at 03/01/23 0514    Vancomycin - pharmacy to dose, , Other, Rx Dosing/Monitoring, Walker Buitrago MD    metroNIDAZOLE (FLAGYL) IVPB premix 500 mg, 500 mg, IntraVENous, Q12H, Promise MOLINA MD, Last Rate: 100 mL/hr at 03/01/23 0914, 500 mg at 03/01/23 0914    heparin (porcine) 1,000 unit/mL injection 1,700 Units, 1,700 Units, InterCATHeter, DIALYSIS PRN, 1,700 Units at 02/20/23 0040 **AND** heparin (porcine) 1,000 unit/mL injection 1,500 Units, 1,500 Units, InterCATHeter, DIALYSIS PRN, Calli Morales DO, 1,500 Units at 02/20/23 0037    balsam peru-castor oiL (VENELEX) ointment, , Topical, BID, Ana Abbasi MD, Given at 03/01/23 0914    acetaminophen (TYLENOL) solution 650 mg, 650 mg, Oral, Q4H PRN, Jess Odell MD, 650 mg at 02/20/23 0401    acetaminophen (TYLENOL) suppository 650 mg, 650 mg, Rectal, Q4H PRN, Jess Odell MD, 650 mg at 02/17/23 2013    HYDROmorphone (DILAUDID) injection 0.5 mg, 0.5 mg, IntraVENous, Q3H PRN, Promise MOLINA MD, 0.5 mg at 02/22/23 2225    HYDROmorphone (DILAUDID) injection 1 mg, 1 mg, IntraVENous, Q4H PRN, Promise MOLINA MD, 1 mg at 02/28/23 0557    heparin 25,000 units in D5W 250 ml infusion, 12-25 Units/kg/hr, IntraVENous, TITRATE, Sloan Whaley MD, Last Rate: 8.8 mL/hr at 03/01/23 0801, 13 Units/kg/hr at 03/01/23 0801    albumin human 25% (BUMINATE) solution 12.5 g, 12.5 g, IntraVENous, Q6H, Jerry Gross MD, Last Rate: 60 mL/hr at 02/27/23 0020, 12.5 g at 03/01/23 1124    Warfarin - MD/NP dosing, , Other, Rx Dosing/Monitoring, Jerry Gross MD    bumetanide [facility]) injection 0.5 mg, 0.5 mg, IntraVENous, Q4H PRN, Jerry Gross MD, 0.5 mg at 02/17/23 1431    glucose chewable tablet 16 g, 4 Tablet, Oral, PRN, Shaila, Lizette B, NP    glucagon (GLUCAGEN) injection 1 mg, 1 mg, IntraMUSCular, PRN, Shaila, Lizette B, NP    sodium chloride (NS) flush 5-40 mL, 5-40 mL, IntraVENous, Q8H, Shaila, Lizette B, NP, 10 mL at 03/01/23 0514    sodium chloride (NS) flush 5-40 mL, 5-40 mL, IntraVENous, PRN, Shaila, Lizette B, NP, 40 mL at 02/17/23 1818    naloxone (NARCAN) injection 0.4 mg, 0.4 mg, IntraVENous, PRN, Shaila, Lizette B, NP    ELECTROLYTE REPLACEMENT NOTE: Nurse to review Serum Potassium and Magnesuim levels and Initiate Electrolyte Replacement Protocol as needed, 1 Each, Other, PRN, Shaila, Lizette B, NP    dextrose 10 % infusion 0-250 mL, 0-250 mL, IntraVENous, PRN, Shaila, Lizette B, NP, Last Rate: 150 mL/hr at 02/24/23 0220, 75 mL at 02/24/23 0220    acetaminophen (TYLENOL) tablet 650 mg, 650 mg, Oral, Q6H PRN, Shaila, Lizette B, NP, 650 mg at 02/17/23 0200    ondansetron (ZOFRAN) injection 4 mg, 4 mg, IntraVENous, Q4H PRN, Shaila, Lizette B, NP    albuterol-ipratropium (DUO-NEB) 2.5 MG-0.5 MG/3 ML, 3 mL, Nebulization, Q6H PRN, Shaila, Lizette B, NP    [Held by provider] aspirin chewable tablet 81 mg, 81 mg, Oral, DAILY, Shaila, Lizette B, NP, 81 mg at 02/17/23 0942    senna-docusate (PERICOLACE) 8.6-50 mg per tablet 1 Tablet, 1 Tablet, Oral, DAILY, Shaila, Lizette B, NP, 1 Tablet at 02/24/23 1225    polyethylene glycol (MIRALAX) packet 17 g, 17 g, Oral, DAILY, Shaila, Lizette B, NP, 17 g at 02/23/23 0911    bisacodyL (DULCOLAX) suppository 10 mg, 10 mg, Rectal, DAILY PRN, Kaylen Leiva B, NP, 10 mg at 23 0839    0.45% sodium chloride infusion, 10 mL/hr, IntraVENous, CONTINUOUS, Khushbu Reeves MD, Last Rate: 10 mL/hr at 23 0801, 10 mL/hr at 23 0801    DOBUTamine (DOBUTREX) 500 mg/250 mL (2,000 mcg/mL) infusion, 0-10 mcg/kg/min, IntraVENous, TITRATE, Miroslava Johnson MD, Stopped at 23 1044    dexmedeTOMidine in 0.9 % NaCl (PRECEDEX) 400 mcg/100 mL (4 mcg/mL) infusion soln, 0.1-1.5 mcg/kg/hr, IntraVENous, TITRATE, Miroslava Johnson MD, Last Rate: 21.2 mL/hr at 23 1017, 1.5 mcg/kg/hr at 23 1017    0.9% sodium chloride infusion, 9 mL/hr, IntraVENous, CONTINUOUS, Travon Mercado MD, Last Rate: 14 mL/hr at 23 0154, 14 mL/hr at 23 0154    EPINEPHrine (ADRENALIN) 10 mg in 0.9% sodium chloride 250 mL infusion, 0-10 mcg/min, IntraVENous, TITRATE, Lizette Linton NP, Last Rate: 4.5 mL/hr at 23 0801, 3 mcg/min at 23 0801    PATIENT CARE TEAM:  Patient Care Team:  Hollie Morrow MD as PCP - General (Family Medicine)  Hollie Morrow MD as PCP - [facility]  Sagrario Restrepo MD (Cardiovascular Disease Physician)  Jose Selby MD (Gastroenterology)  Travon Mercado MD (Cardiothoracic Surgery)  Siena Street MD (Cardiovascular Disease Physician)  Sherre Apgar, MD (Nephrology)  Booker Aguilera MD (Pulmonary Disease)  Khushbu Reeves MD ([address] [place] [address] Vascular Surgery)     Thank you for allowing me to participate in this patient's care.     Nilda Morrell MD   [address] [place] [address], [address]  Phone: [phone]    Critically ill  Critical care 40min

## 2023-03-01 NOTE — PROGRESS NOTES
[facility] in [place], [place]      Met with patient's wife and daughter at bedside. At this time they would like to hold off on LVAD equipment training due to patient's condition. Advised that LVAD coordinators will continue to touch base with family on readiness to be educated.

## 2023-03-01 NOTE — PROGRESS NOTES
Transitions of Care Plan  RUR: 28% - high  Clinical Update: LVAD; multiple pressor support; CRRT; intubated; RVAD - removed  Consults: Multiple  Baseline: ambulates w RW; resides w wife  Barrier(s) to Discharge: medical  Disposition:   Home Health: [address] [place] [address]  Estimated Discharge Date: 2+ days    Clinical update per chart review and/or patient discussed during Interdisciplinary Rounds:     Patient is not medically stable for discharge due to ongoing medical needs. CM continues to follow treatment plan for medical progress and disposition needs.      Disposition:  Pending medical progress    Ronaldo Blood, MPH  Care Manager [facility]  Available via [facility] or

## 2023-03-02 NOTE — WOUND CARE
WOCN Note:      Follow up for new area on left heel      Admitted on 12/22/22 for sepsis, cardiogenic shock, respiratory failure. LVAD 2/15; Impella 2/19/23; CVA 3/1; SAH enlarged 3/2  History of MI, CABG x3, DM, CHF. Assessment:   Intubated, sedated. Vasquez  Resting on NARENDRA mattress with heels offloaded by pillows. 1. POA Unstageable Pressure Injury  Dry, stable eschar   Betadine in use. 2. Left Heel Deep Tissue Injury  Blood-filled bullae    Wound Recommendations:    Sacrum: Apply Balsam peru-castor oil (Venelex) daily and cover with sacral foam   Bilateral heels: paint with betadine daily and allow to air dry; offload heels at all times. DO NOT apply Venelex to heels. PI Prevention:  Turn/reposition approximately every 2 hours  Offload heels with heels hanging off end of pillow at all times while in bed. Sacral Foam dressing: lift to assess regularly; change as needed. Discontinue if incontinence is frequently soiling dressing. Low Air Loss mattress: Use only flat sheet and one incontinence pad. No changes in condition to share with provider.      Transition of Care: Plan to follow weekly and as needed while admitted to hospital.      Escobar Guteirrez [facility] [place]

## 2023-03-02 NOTE — PROGRESS NOTES
2000 Bedside shift change report given to [address] (oncoming nurse) by Tara Mckay RN (offgoing nurse). Report included the following information SBAR, Kardex, ED Summary, OR Summary, Procedure Summary, Intake/Output, MAR, Accordion, Recent Results, and Cardiac Rhythm 1 AVB . Assessment completed and LVAD settings noted. Levo RESTARTED for low MAP in 58. Propofol STOPPED for neuro exam. Pupils unequal but reactive with R (3-4mm)>L (2mm). Intensivist notified at came to bedside. No new orders at this time. 2200 Temperature 100.4 F- warming blanket removed. 2300 Neuro exam completed- patient able to move all extremities spontaneously to stimulation with exception of LUE, which withdraws to pain. Propofol remains STOPPED since 2000.    2340 Intensivist informed of neuro exam and HF team paged. 2350 Intensivist at bedside- Code S called. Patient prepped for transport; awaiting Neuro NP.    2355 NP at bedside with supervisors, RT, ICU RN, and Intensivist. Intensivist updated NP on patient condition. STAT CT/CTAs ordered. 0006 HF team paged x2. Patient CRRT rinsed back for transport. Patient transported to CT department on telemetry with primary RN, secondary RN and RT.     0036 Patient returned to [facility]. Dialysis RN on the way to restart CRRT.     Siva Maciel returned page- updated on patient condition. Continue heparin gtt as ordered. 0100 Reassessment- patient now able to move LUE but still weaker when compared to RUE.     0126 PTT resulted SUBTHERAPEUTIC- Dr Inna Oconnell informed of heparin gtt protocol- RN to only increase dose of gtt, NO bolus. Plan for repeat CTH at 0730 per [address] CRRT resumed. 0400 Patients MAPs sustaining in 60s- levo at 3mcg. Epi titrated to 3mcg per Intensivist. Assessment completed- pupils now brisk and equal at 2mm.     0725 Patient rinsed back from CRRT and transported to CT department for ordered repeat CTH.     0800 Patient hypertensive in CT- low flow alarms sounding. Cardene restarted, propofol titrated, Levo stopped. 3926 Patient returned to [facility] from [address] Bedside shift change report given to Adam Mirza (oncoming nurse) by Fidel Anglin RN (offgoing nurse). Report included the following information SBAR, Kardex, ED Summary, OR Summary, Procedure Summary, Intake/Output, MAR, Accordion, Recent Results, and Cardiac Rhythm 1 AVB . _____________________________________________  Problem: Diabetes Self-Management  Goal: *Disease process and treatment process  Description: Define diabetes and identify own type of diabetes; list 3 options for treating diabetes. Outcome: Progressing Towards Goal  Goal: *Incorporating nutritional management into lifestyle  Description: Describe effect of type, amount and timing of food on blood glucose; list 3 methods for planning meals. Outcome: Progressing Towards Goal  Goal: *Incorporating physical activity into lifestyle  Description: State effect of exercise on blood glucose levels. Outcome: Progressing Towards Goal  Goal: *Developing strategies to promote health/change behavior  Description: Define the ABC's of diabetes; identify appropriate screenings, schedule and personal plan for screenings. Outcome: Progressing Towards Goal  Goal: *Using medications safely  Description: State effect of diabetes medications on diabetes; name diabetes medication taking, action and side effects. Outcome: Progressing Towards Goal  Goal: *Monitoring blood glucose, interpreting and using results  Description: Identify recommended blood glucose targets  and personal targets. Outcome: Progressing Towards Goal  Goal: *Prevention, detection, treatment of acute complications  Description: List symptoms of hyper- and hypoglycemia; describe how to treat low blood sugar and actions for lowering  high blood glucose level.   Outcome: Progressing Towards Goal  Goal: *Prevention, detection and treatment of chronic complications  Description: Define the natural course of diabetes and describe the relationship of blood glucose levels to long term complications of diabetes. Outcome: Progressing Towards Goal  Goal: *Developing strategies to address psychosocial issues  Description: Describe feelings about living with diabetes; identify support needed and support network  Outcome: Progressing Towards Goal  Goal: *Insulin pump training  Outcome: Progressing Towards Goal  Goal: *Sick day guidelines  Outcome: Progressing Towards Goal  Goal: *Patient Specific Goal (EDIT GOAL, INSERT TEXT)  Outcome: Progressing Towards Goal     Problem: Patient Education: Go to Patient Education Activity  Goal: Patient/Family Education  Outcome: Progressing Towards Goal     Problem: Pressure Injury - Risk of  Goal: *Prevention of pressure injury  Description: Document Mike Scale and appropriate interventions in the flowsheet.   Outcome: Progressing Towards Goal  Note: Pressure Injury Interventions:  Sensory Interventions: Assess changes in LOC, Assess need for specialty bed, Avoid rigorous massage over bony prominences, Check visual cues for pain, Float heels, Keep linens dry and wrinkle-free, Discuss PT/OT consult with provider, Maintain/enhance activity level, Minimize linen layers, Monitor skin under medical devices    Moisture Interventions: Check for incontinence Q2 hours and as needed, Apply protective barrier, creams and emollients, Assess need for specialty bed, Contain wound drainage, Limit adult briefs    Activity Interventions: Pressure redistribution bed/mattress(bed type)    Mobility Interventions: Pressure redistribution bed/mattress (bed type), PT/OT evaluation    Nutrition Interventions: Document food/fluid/supplement intake    Friction and Shear Interventions: Apply protective barrier, creams and emollients, Feet elevated on foot rest, Foam dressings/transparent film/skin sealants, HOB 30 degrees or less, Lift sheet, Minimize layers                Problem: Patient Education: Go to Patient Education Activity  Goal: Patient/Family Education  Outcome: Progressing Towards Goal     Problem: Falls - Risk of  Goal: *Absence of Falls  Description: Document Remigio King Fall Risk and appropriate interventions in the flowsheet.   Outcome: Progressing Towards Goal  Note: Fall Risk Interventions:  Mobility Interventions: PT Consult for mobility concerns, PT Consult for assist device competence         Medication Interventions: Evaluate medications/consider consulting pharmacy, Patient to call before getting OOB, Teach patient to arise slowly    Elimination Interventions: Call light in reach, Stay With Me (per policy)    History of Falls Interventions: Door open when patient unattended, Evaluate medications/consider consulting pharmacy         Problem: Patient Education: Go to Patient Education Activity  Goal: Patient/Family Education  Outcome: Progressing Towards Goal     Problem: Pain  Goal: *Control of Pain  Outcome: Progressing Towards Goal  Goal: *PALLIATIVE CARE:  Alleviation of Pain  Outcome: Progressing Towards Goal     Problem: Patient Education: Go to Patient Education Activity  Goal: Patient/Family Education  Outcome: Progressing Towards Goal     Problem: Patient Education: Go to Patient Education Activity  Goal: Patient/Family Education  Outcome: Progressing Towards Goal     Problem: Patient Education: Go to Patient Education Activity  Goal: Patient/Family Education  Outcome: Progressing Towards Goal     Problem: Nutrition Deficit  Goal: *Optimize nutritional status  Outcome: Progressing Towards Goal     Problem: Ventilator Management  Goal: *Adequate oxygenation and ventilation  Outcome: Progressing Towards Goal  Goal: *Patient maintains clear airway/free of aspiration  Outcome: Progressing Towards Goal  Goal: *Absence of infection signs and symptoms  Outcome: Progressing Towards Goal  Goal: *Normal spontaneous ventilation  Outcome: Progressing Towards Goal     Problem: Patient Education: Go to Patient Education Activity  Goal: Patient/Family Education  Outcome: Progressing Towards Goal     Problem: Patient Education: Go to Patient Education Activity  Goal: Patient/Family Education  Outcome: Progressing Towards Goal     Problem: LVAD Pre-op Period  Goal: Off Pathway (Use only if patient is Off Pathway)  Outcome: Progressing Towards Goal  Goal: Activity/Safety  Outcome: Progressing Towards Goal  Goal: Consults, if ordered  Outcome: Progressing Towards Goal  Goal: Diagnostic Test/Procedures  Outcome: Progressing Towards Goal  Goal: Nutrition/Diet  Outcome: Progressing Towards Goal  Goal: Medications  Outcome: Progressing Towards Goal  Goal: Treatments/Interventions/Procedures  Outcome: Progressing Towards Goal  Goal: Discharge Planning  Outcome: Progressing Towards Goal  Goal: Psychosocial  Outcome: Progressing Towards Goal  Goal: *Hemodynamically stable (eg: Cardiac output/index; pulmonary arterial pressures; mixed venous oxygen saturation within set parameters)  Outcome: Progressing Towards Goal  Goal: *Labs/tests completed  Outcome: Progressing Towards Goal     Problem: LVAD Pre-Op Day  Goal: Off Pathway (Use only if patient is Off Pathway)  Outcome: Progressing Towards Goal  Goal: Activity/Safety  Outcome: Progressing Towards Goal  Goal: Consults, if ordered  Outcome: Progressing Towards Goal  Goal: Diagnostic Test/Procedures  Outcome: Progressing Towards Goal  Goal: Nutrition/Diet  Outcome: Progressing Towards Goal  Goal: Medications  Outcome: Progressing Towards Goal  Goal: Treatments/Interventions/Procedures  Outcome: Progressing Towards Goal  Goal: Discharge Planning  Outcome: Progressing Towards Goal  Goal: Psychosocial  Outcome: Progressing Towards Goal  Goal: *Vital signs within specified parameters  Outcome: Progressing Towards Goal  Goal: *Consent obtained, permits and diagnostics complete  Outcome: Progressing Towards Goal  Goal: *Confirmation of post discharge primary support person(s)  Outcome: Progressing Towards Goal     Problem: LVAD Day of Surgery (Initiate SCIP measure for post-op care)  Goal: Off Pathway (Use only if patient is Off Pathway)  Outcome: Progressing Towards Goal  Goal: Activity/Safety  Outcome: Progressing Towards Goal  Goal: Consults, if ordered  Outcome: Progressing Towards Goal  Goal: Diagnostic Test/Procedures  Outcome: Progressing Towards Goal  Goal: Nutrition/Diet  Outcome: Progressing Towards Goal  Goal: Medications  Outcome: Progressing Towards Goal  Goal: Respiratory  Outcome: Progressing Towards Goal  Goal: Treatments/Interventions/Procedures  Outcome: Progressing Towards Goal  Goal: Psychosocial  Outcome: Progressing Towards Goal  Goal: *Hemodynamically stable (eg: Cardiac output/index; pulmonary arterial pressures; mixed venous oxygen saturation within set parameters)  Outcome: Progressing Towards Goal  Goal: *Lungs clear or at baseline  Outcome: Progressing Towards Goal  Goal: *Stable cardiac rhythm  Outcome: Progressing Towards Goal  Goal: *Follows simple commands post anesthesia  Outcome: Progressing Towards Goal  Goal: *Optimal pain control at patient's stated goal  Outcome: Progressing Towards Goal  Goal: *LVAD parameters within set limits  Outcome: Progressing Towards Goal     Problem: Nutrition Deficit  Goal: *Optimize nutritional status  Outcome: Progressing Towards Goal

## 2023-03-02 NOTE — PROGRESS NOTES
Cardiac Surgery Coordinator- Met with the family of Tyron Red, discussed family meeting with Dr Vanesa Pope. They are without questions at this time. Offered emotional support, will continue to follow.  Tiff Osuna RN

## 2023-03-02 NOTE — PROGRESS NOTES
0730 Pt taken to for repeat HCT  Hypertensive in CT scanner, Some low flow alarms on LVAD r/t hypertension  Nicardipine and propofol gtt restarted at CT, low flow alarms resolved    1000 Weaning sedation for SAT/SBT  Neuro exam: Pt alert in bed  Nodding yes/no to nurse  Tracking w/ eyes  PERRLA  Gesturing spontaneously and obey commands w/ RUE  Obey commands in RLE  Obey commands in LUE  Withdraw to pain in LLE    1100 family meeting    1158 ABG 7.41 / 33.1 / 62; Pt on SBT > 1hr, FiO2 30% / PEEP 5 / Psup 8  Pt alert, following commands  Sticking tongue out on command    1220 Extubated to BiPAP    1310 Pt very dyspnic on BiPAP, tachypnia with poor tidal volumes  SpO2 dropping to 85%  Pt placed on 100% FiO2  Dr. Rocio Dorantes called to bedside for re-intubation    1330 re-intubated, rocuronium and etomidate  Levo and epi gtt inc during induction  Propofol gtt restarted    1416 ABG 7.26 / 48.9 / 81 / 22  RR inc from 18 to 20    1600 Pt breathing over vent now, cough/gag intact  Weaning sedation for exam    1800 Neuro exam congruent w/ am exam    Bedside and Verbal shift change report given to [address] [place] [address] (oncoming nurse) by Moshe Dobbs RN (offgoing nurse). Report included the following information SBAR, OR Summary, and Intake/Output.

## 2023-03-02 NOTE — PROGRESS NOTES
RENAL  PROGRESS NOTE        Subjective:     Remains Intubated. On CRRT. Noticed CNS event  Objective:   VITALS SIGNS:    Visit Vitals  BP (!) 90/59   Pulse (!) 114   Temp 98.6 °F (37 °C)   Resp 19   Ht 5' 6\" (1.676 m)   Wt 62.8 kg (138 lb 7.2 oz)   SpO2 100%   BMI 22.35 kg/m²       O2 Device: Endotracheal tube, Ventilator   O2 Flow Rate (L/min): 20 l/min   Temp (24hrs), Av.2 °F (33.4 °C), Min:65.3 °F (18.5 °C), Max:100.4 °F (38 °C)         PHYSICAL EXAM:  Intubated  +ETT  + LE edema      DATA REVIEW:     INTAKE / OUTPUT:   Last shift:       07 -  190  In: 266.8 [I.V.:164.8]  Out: 60 [Drains:60]  Last 3 shifts:  190 -  0700  In: 6273.6 [I.V.:4798.6]  Out: 5423.7 [Drains:260]    Intake/Output Summary (Last 24 hours) at 3/2/2023 1050  Last data filed at 3/2/2023 0900  Gross per 24 hour   Intake 4240.84 ml   Output 3434.6 ml   Net 806.24 ml           LABS:   Recent Labs     236 23  0437 23  1614 23  0339   WBC 6.7 8.4  --  8.8   HGB 8.3* 8.9* 8.7* 7.0*   HCT 26.5* 27.3* 27.2* 22.8*    201  --  171       Recent Labs     23  0346 23  0437 23  1614 23  0339   * 137 138 136   K 4.4 3.2* 3.6 4.0    104 106 104   CO2 22 24 25 25   * 133* 122* 174*   BUN 25* 19 21* 25*   CREA 1.62* 1.59* 1.61* 1.64*   CA 10.8* 10.8* 10.3* 9.7   MG 2.7* 2.5* 2.6* 2.8*   PHOS 3.9 2.2* 2.4* 2.8   ALB 4.0 3.7  --  3.6   TBILI 13.6* 15.0*  --  13.7*   ALT 50 52  --  37   INR 1.7* 1.5*  --  1.6*           Assessment:  TANNER on CKD-3a: Suspect cardiorenal effects with needed diuresis. Now has LVAD and  POST OP ATN. Anuric->Requiring CRRT   hypercalcemia  Ischemic CM: Recurrent exacerbations. EF 20%. S/p LVAD on 2/15. Required Impella RP for RV support-> attempts to wean not tolerated by patient.    CVA  Sepsis: Urosepsis-> growing candida    BPH     Well differentiated NET Grade 1: noted on EGD 23     Anemia 2 to CKD:  s/p PRBCS      Left UE basilic and radial vein thrombus    Thrombocytopenia       Plan/Recommendations:  Seen on [facility] .    Decrease every day 1500 cc/hr   4 K Prismasol bags    Still factor zero fro now,might need to go up on it soon    Daily CRRT labs      BRIGHT  PRBC as per cardiac team    Discussed with RN  Hunter medina MD  [address] [facility]

## 2023-03-02 NOTE — PROGRESS NOTES
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 Jovon Cope  C/b RV failure requiring Impella RP placed 23 and discontinued 3/1/23  C/b acute on chronic renal failure, requiring CRRT  C/b hepatic failure, TB 15.1 (peak 15.3)  C/b lactic acidosis, resolved  C/b persistent septic shock c/b vasodilation and high outputs, on multiple antibiotics, procalcitonin improving, still elevated 2.33  C/b R SUPA occlusion with small area of matched perfusion defect - subacute infarct  Patient was approved for LVAD implantation as destination therapy at [facility] 2/3/23; the following have been identified and d/w patient as relative concerns pertaining to LVAD candidacy: small body surface area, cachexia, BMI 18, malnutrition, frailty, advanced age, muscular deconditioning, PVD (fingertips), urinary retention requiring donnelly catheter, neuroendocrine tumor class 1 requiring treatment after LVAD, mild neurocognitive dysfunction, chronic kidney disease and hearing loss requiring hearing aid  Family meeting with AHF team today at 11am for updates and next steps        RECOMMENDATIONS:  Continue LVAD at 4500rpm  Decrease epi to 2; plan on slow wean over next 1-2 days; CI at goal by [facility]  Increase cardene gtt for goal SBP < 110mmHg and/or MAP < 90mmHg  Check echocardiogram and EKG today  Continuous NICOMs; change patches every other day  No beta-blockers due to hypotension and RV conditioning prior to LVAD  Cannot tolerate ARB/ARNi due to hypotension and upcoming surgical procedure   Cannot tolerate spironolactone due to hyperkalemia  Cannot tolerate jardiance due to significant diuresis on smallest dose/contributed to IVVD  Previously discontinued corlanor due to SVT  Digoxin stopped d/t risk for toxicity with amio loading  Discontinued amio due to intermitted heart blocks under pacemaker  Nephrology consult appreciated, cont CRRT, goal CVP 10-12  Keep K+ >4 and Mg >2  Transfuse one unit pRBC for hgb > 7 prn  Continue antibiotics, zosyn, vanc, flagyl and diflucan  Continue colchicine 0.6mg daily for possible pericarditis  Hold ASA d/t thrombocytopenia- improving   Continue coumadin 1mg now and 1mg in PM, INR goal 2-3 and heparin gtt  Cannot tolerate statins due to abnormal LFT  Timing of chest tube removal per Dr. Mallory March TF   Continue bowel regimen   Daily dressing changes to Drive line exit site  Pulmonary hygiene   Timing of extubation per intensivist- SBT today  Neurology consultation re: further recommendations and plans for surveillance  VAD education with caregivers/patient when able by VAD coordinator  D/w intensivist, Dr. Marietta Conte, [address] coordinator and bedside staff      INTERVAL HISTORY:  Left sided weakness noted last night  SBT ongoing  RVAD removed  MAPs 80s, HR 90-100s  CVP 14  Epi 3, levo off, propofol gtt  I/O net positive 724, urine 3cc, drains 150cc  Hgb 8.3  INR 1.7, aPTT 75  Cr 1.6 on CRRT  TB 13.6 from 15, peak 15.3  Lactic 2.0 from 1.7  Procalc 2.33 from 2.61 from 2.66 from 3.14 from 3.55 from 4.24  CXR (3/2/23) Probable increased pulmonary edema. Increased pleural effusions and bibasilar  airspace opacities. Head CTA (3/2/23) Occlusion distal right anterior cerebral artery, with associated small area of  matched perfusion defect and cortical enhancement, consistent with subacute infarct. Diminutive and irregular right vertebral artery, occluded at the V3-4 junction, age indeterminate extensive multifocal atherosclerosis in the intracranial and extracranial circulation.      IMPRESSION:  Acute on chronic combined systolic/diastolic  heart failure  Stage D, NYHA class IV symptoms   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, LVEF 20% (by echo 1/2023) and 23% (by cMRI 1/3/23)  Cardiac MRI suggestive of ischemic cardiomyopathy  PYP equivocal  S/p HeartMate 3  LVAD-DT (2/15/23)  C/b RV failure requiring Impella RP (2/18/23)  C/b franco on CKD requiring CRRT  C/b liver dysfunction (ischemic vs congestive)  Coronary artery disease  CAD s/p CABG x 2: further disease best managed medically due to small vessel size   At risk of sudden cardiac death  Peripheral arterial disease  Bilateral hydronephrosis s/p donnelly  Cardiac risk factors:  HTN  HL  TRISTAN, STOP-BANG 4  DM2  CKD, stage 3  MOCA from 2/9 19/30, consistent with mild cognitive impairment  Hard of hearing  Gastric Neuroendocrine Tumor, elevated gastrin and chromogranin A  Septic shock, source pending         LIFE GOALS:  Patient's personal goals included: having a few more years with family  Important upcoming milestones or family events: None at this time   The patient identified the following as important for living well: being at home, not being SOB            CARDIAC IMAGING:   Echo 2/19/23    Left Ventricle: Severely reduced left ventricular systolic function with a visually estimated EF of 20 - 25%. Not well visualized. Left ventricle size is normal. Increased wall thickness. Unable to assess wall motion. Abnormal diastolic function. LVAD is present. LVAD inflow cannula was not well visualized. Right Ventricle: Not well visualized. Right ventricle is mildly dilated. Moderately reduced systolic function. TAPSE is abnormal.    Mitral Valve: Severe annular calcification at the anterior and posterior leaflets of the mitral valve. Mild regurgitation. Left Atrium: Left atrium is dilated. Right Atrium: Right atrium is dilated.     Technical qualifiers: Echo study was technically difficult and technically difficult with poor endocardial visualization. Echo 1/27/23    Left Ventricle: EF by visual approximation is 20%. Left ventricle is mildly dilated. Mitral Valve: Moderately thickened leaflet, at the anterior and posterior leaflets. Moderately calcified leaflet. Moderate regurgitation. Left Atrium: Left atrium is moderately dilated. Technical qualifiers: Echo study was technically difficult with poor endocardial visualization. Contrast used: Definity. Limited study, not all structures viewed     Echo 1/9/23   Left Ventricle: Severely reduced left ventricular systolic function with a visually estimated EF of 25 - 30%. Left ventricle size is normal. Mildly increased wall thickness. Echo 12/26/22    Left Ventricle: Severely reduced left ventricular systolic function with a visually estimated EF of 25 - 30%. Left ventricle size is normal. Normal wall thickness. There are regional wall motion abnormalities. Grade II diastolic dysfunction with increased LAP. Right Ventricle: Moderately reduced systolic function. TAPSE is abnormal. TAPSE is 1.1 cm. Aortic Valve: Mild stenosis of the aortic valve. AV peak gradient is 13 mmHg. AV peak velocity is 1.8 m/s. Mitral Valve: Not well visualized. Moderate annular calcification at the posterior leaflet of the mitral valve. Mild to moderate regurgitation. Tricuspid Valve: Mildly elevated RVSP. Left Atrium: Left atrium is moderately dilated. 12/8/22    Left Ventricle: Moderately reduced left ventricular systolic function with a visually estimated EF of 35 - 40%. Severe hypokinesis of the following segments: mid anteroseptal, apical anterior, apical septal, apical inferior and apical lateral. Severe hypokinesis of the apex. Mitral Valve: Severely thickened leaflet, at the anterior and posterior leaflets. Severely calcified leaflet, at the anterior and posterior leaflets. Mild annular calcification of the mitral valve. Moderate regurgitation.     Left Atrium: Left atrium is mildly dilated. Contrast used: Definity. limited study     EKG 12/22/22 ST, Biatria enlargement, marked ST abnormality     [facility] 12/6/22  1. Normal LVEDP  2. Severe native multivessel coronary artery disease  3. Patent LIMA to LAD and vein graft to distal RCA  4. Recurrent ISR in OM1 stent with now 60 to 70% restenosis  5. Recoil of left main and circumflex stent with now recurrent 40 to 50% stenosis. 6.  Progression of ostial left main disease now to about 60% stenosis  7. Progression of disease in jailed first marginal branch now with diffuse 90% stenosis  8. High-grade stenosis in the mid to distal right potential femoral artery treated with 6 x 40 mm impact drug-coated balloon angioplasty to reduce the stenosis to less than 40%        HEMODYNAMICS:  RHC 1/9/23  PA 20/9, RA 3, PCWP 8, CI 1.8     CPEST too ill   6MW 300 feet    LVAD INTERROGATION:  Device interrogated in person  Device function normal, normal flow, no events  LVAD   Pump Speed (RPM): 4600  Pump Flow (LPM): 3.4  MAP: 68  PI (Pulsitility Index): 4.2  Power: 3   Test: No  Back Up  at Bedside & Labeled: Yes  Power Module Test: No  Driveline Site Care: No  Driveline Dressing: Clean, Dry, and Intact  Testing  Alarms Reviewed: Yes  Back up SC speed: 4600  Back up Low Speed Limit: 4200  Emergency Equipment with Patient?: Yes  Emergency procedures reviewed?: Yes  Drive line site inspected?: No  Drive line intergrity inspected?: Yes  Drive line dressing changed?: No    PHYSICAL EXAM:  Visit Vitals  BP (!) 90/59   Pulse (!) 114   Temp 98.6 °F (37 °C)   Resp 19   Ht 5' 6\" (1.676 m)   Wt 138 lb 7.2 oz (62.8 kg)   SpO2 100%   BMI 22.35 kg/m²     Physical Exam  Vitals and nursing note reviewed. Constitutional:       Appearance: He is ill-appearing. Interventions: He is sedated and intubated, follows commands, pupils equal  Cardiovascular:      Rate and Rhythm: Normal rate and regular rhythm.       Comments: LVAD Humm noted on auscultation   Pulmonary:      Effort: Pulmonary effort is normal. No respiratory distress. He is intubated. Breath sounds: Clear lungs     Comments:   Abdominal:      General: Bowel sounds are decreased. There is no distension. Comments: Driveline exit site with dressing C/D/I   Musculoskeletal:      Right lower le+ pitting edema present. Left lower le+ pitting edema present. Comments: Left sided weakness leg and arm  Skin:     Capillary Refill: Capillary refill takes more than 3 seconds. Coloration: Skin is jaundiced. REVIEW OF SYSTEMS:  Review of Systems   Unable to perform ROS: Intubated    PAST MEDICAL HISTORY:  Past Medical History:   Diagnosis Date    CAD (coronary artery disease) 11/10/2016    NSTEMI & 2 stents    Deafness 10/28/2012    DM (diabetes mellitus) ([facility] [address]     Elevated cholesterol     Hypertension     NSTEMI (non-ST elevated myocardial infarction) ([facility] [address] 11/10/2016       PAST SURGICAL HISTORY:  Past Surgical History:   Procedure Laterality Date    COLONOSCOPY N/A 2018    COLONOSCOPY performed by Luisito Crump MD at [facility] ENDOSCOPY    COLONOSCOPY N/A 2023    COLONOSCOPY performed by Adrian Angel MD at [facility] ENDOSCOPY    [address]  2016    2 stents       FAMILY HISTORY:  Family History   Problem Relation Age of Onset    Heart Disease Father     Heart Attack Father     Hypertension Mother     Elevated Lipids Brother     Elevated Lipids Brother     No Known Problems Sister     Elevated Lipids Brother     No Known Problems Son     No Known Problems Daughter     Anesth Problems Neg Hx        SOCIAL HISTORY:  Social History     Socioeconomic History    Marital status:    Tobacco Use    Smoking status: Never     Passive exposure: Never    Smokeless tobacco: Never   Vaping Use    Vaping Use: Never used   Substance and Sexual Activity    Alcohol use:  Yes     Alcohol/week: 2.0 standard drinks     Types: 1 Cans of beer, 1 Shots of liquor per week     Comment: rarely    Drug use: No    Sexual activity: Yes     Social Determinants of Health     Financial Resource Strain: Medium Risk    Difficulty of Paying Living Expenses: Somewhat hard   Food Insecurity: Food Insecurity Present    Worried About Running Out of Food in the Last Year: Never true    Ran Out of Food in the Last Year: Often true       LABORATORY RESULTS:     Labs Latest Ref Rng & Units 3/2/2023 3/1/2023 2/28/2023 2/28/2023 2/27/2023 2/27/2023 2/27/2023   WBC 4.1 - 11.1 K/uL 6.7 8.4 - 8.8 - 8.9 -   RBC 4.10 - 5.70 M/uL 2.69(L) 2.92(L) - 2.31(L) - 2.52(L) -   Hemoglobin 12.1 - 17.0 g/dL 8. 3(L) 8. 9(L) 8.7(L) 7. 0(L) - 7. 3(L) -   Hematocrit 36.6 - 50.3 % 26. 5(L) 27. 3(L) 27. 2(L) 22. 8(L) - 24. 2(L) -   MCV 80.0 - 99.0 FL 98.5 93.5 - 98.7 - 96.0 -   Platelets 933 - 402 K/uL 190 201 - 171 - 166 -   Lymphocytes 12 - 49 % 6(L) 12 - 8(L) - - -   Monocytes 5 - 13 % 5 11 - 10 - - -   Eosinophils 0 - 7 % 5 6 - 4 - - -   Basophils 0 - 1 % 1 1 - 1 - - -   Albumin 3.5 - 5.0 g/dL 4.0 3.7 - 3.6 - - 3.9   Calcium 8.5 - 10.1 MG/DL 10. 8(H) 10. 8(H) 10. 3(H) 9.7 9.3 - 9.3   Glucose 65 - 100 mg/dL 189(H) 133(H) 122(H) 174(H) 105(H) - 158(H)   BUN 6 - 20 MG/DL 25(H) 19 21(H) 25(H) 23(H) - 22(H)   Creatinine 0.70 - 1.30 MG/DL 1.62(H) 1.59(H) 1.61(H) 1.64(H) 1.51(H) - 1.63(H)   Sodium 136 - 145 mmol/L 135(L) 137 138 136 138 - 138   Potassium 3.5 - 5.1 mmol/L 4.4 3.2(L) 3.6 4.0 4.0 - 4.7   TSH 0.36 - 3.74 uIU/mL - - - - - - -   PSA 0.01 - 4.0 ng/mL - - - - - - -   LDH 85 - 241 U/L 419(H) 612(H) - 582(H) - - 594(H)   Some recent data might be hidden     Lab Results   Component Value Date/Time    TSH 3.52 01/28/2023 05:26 AM    TSH 2.12 12/27/2022 02:36 PM    TSH 4.80 (H) 12/06/2022 03:53 AM    TSH 5.39 (H) 10/12/2022 09:10 AM    TSH 3.53 02/03/2022 11:47 AM    TSH 5.790 (H) 11/21/2019 04:45 PM    TSH 3.08 06/22/2018 01:53 PM    TSH 4.250 05/26/2015 09:43 AM ALLERGY:  Allergies   Allergen Reactions    Ambien [Zolpidem] Other (comments)     Causes extreme confusion        CURRENT MEDICATIONS:    Current Facility-Administered Medications:     bicarbonate dialysis (PRISMASOL) BG K 4/Ca 2.5 5000 ml solution, , Extracorporeal, DIALYSIS CONTINUOUS, Gagan Schultz MD, Last Rate: 1,750 mL/hr at 03/02/23 0715, New Bag at 03/02/23 0715    insulin NPH (NOVOLIN N, HUMULIN N) injection 16 Units, 16 Units, SubCUTAneous, Q12H, Cathy Sheldon CNS, 16 Units at 03/02/23 0845    insulin lispro (HUMALOG) injection, , SubCUTAneous, Q4H, Cathy Sheldon CNS, 2 Units at 03/02/23 0845    dextrose 10 % infusion 0-250 mL, 0-250 mL, IntraVENous, PRN, Cathy Sheldon CNS    epoetin heidy-epbx (RETACRIT) injection 10,000 Units, 10,000 Units, SubCUTAneous, Q TUE, THU & SAT, Gagan Schultz MD, 10,000 Units at 02/28/23 2105    EPINEPHrine (ADRENALIN) 10 mg in 0.9% sodium chloride 250 mL infusion, 0-10 mcg/min, IntraVENous, TITRATE, Nikki Johnson MD, Last Rate: 3 mL/hr at 03/02/23 1038, 2 mcg/min at 03/02/23 1038    amiodarone (CORDARONE) 900 mg/250 ml D5W infusion, 0.5-1 mg/min, IntraVENous, TITRATE, Nikki Johnson MD    NOREPINephrine (LEVOPHED) 32,000 mcg in dextrose 5% 250 mL (128 mcg/mL) infusion, 0.5-16 mcg/min, IntraVENous, TITRATEElizabeth Flavia Schneider, MD    vasopressin (VASOSTRICT) 20 Units in 0.9% sodium chloride 100 mL infusion, 0-0.04 Units/min, IntraVENous, TITRElizabeth AZAR Flavia Schneider, MD    PHENYLephrine (JONELLE-SYNEPHRINE) 100 mg in 0.9% sodium chloride 250 mL infusion,  mcg/min, IntraVENous, TITRATE, Nikki Johnson MD    niCARdipine (CARDENE) 50 mg in 0.9% sodium chloride 100 mL infusion, 0-15 mg/hr, IntraVENous, TITRATE, Fiona Jamison DO, Last Rate: 5 mL/hr at 03/02/23 1038, 2.5 mg/hr at 03/02/23 1038    dextrose 10 % infusion 0-250 mL, 0-250 mL, IntraVENous, PRN, Cathy Sheldon, CNS    propofol (DIPRIVAN) 10 mg/mL infusion, 0-50 mcg/kg/min, IntraVENous, TITRATE, Javier Warren MD, Stopped at 03/02/23 0825    HYDROmorphone 30 mg/30 mL (1 mg/mL) infusion, 0.5-1 mg/hr, IntraVENous, CONTINUOUS, Sera Johnson MD, Stopped at 03/02/23 0941    dextrose (D50W) injection syrg 25 g, 25 g, IntraVENous, PRN, Connie Pierson MD, 9 mL at 02/25/23 1225    neomycin-polymyxin-dexamethasone ([place]) 3.5 mg/g-10,000 unit/g-0.1 % ophthalmic ointment, , Both Eyes, BID, Jose J Wallace MD, Given at 03/02/23 0850    NOREPINephrine (LEVOPHED) 8 mg in 5% dextrose 250mL (32 mcg/mL) infusion, 0.5-16 mcg/min, IntraVENous, TITRATE, Sera Johnson MD, Stopped at 03/02/23 0800    alteplase (CATHFLO) 3 mg in dextrose 5% 50 mL impella purge solution, 3 mg, Other, TITRATE, Johnny Orozco MD, Stopped at 03/01/23 1055    colchicine tablet 0.6 mg, 0.6 mg, Oral, DAILY, Lizette Linton, NP, 0.6 mg at 03/02/23 0845    fluconazole (DIFLUCAN) 400mg/200 mL IVPB (premix), 400 mg, IntraVENous, Q24H, Lalo Lintonin B, NP, Stopped at 03/01/23 1900    vancomycin (VANCOCIN) 750 mg in 0.9% sodium chloride 250 mL ([id number]), 750 mg, IntraVENous, Q24H, Walker Aponte MD, Last Rate: 250 mL/hr at 03/01/23 1510, 750 mg at 03/01/23 1510    piperacillin-tazobactam (ZOSYN) 3.375 g in 0.9% sodium chloride (MBP/ADV) 100 mL MBP, 3.375 g, IntraVENous, Q8H, Walker Aponte MD, Last Rate: 25 mL/hr at 03/02/23 0447, 3.375 g at 03/02/23 0447    Vancomycin - pharmacy to dose, , Other, Rx Dosing/Monitoring, Walker Aponte MD    metroNIDAZOLE (FLAGYL) IVPB premix 500 mg, 500 mg, IntraVENous, Q12H, Coleen MOLINA MD, Last Rate: 100 mL/hr at 03/02/23 0846, 500 mg at 03/02/23 0846    heparin (porcine) 1,000 unit/mL injection 1,700 Units, 1,700 Units, InterCATHeter, DIALYSIS PRN, 1,700 Units at 03/02/23 0736 **AND** heparin (porcine) 1,000 unit/mL injection 1,500 Units, 1,500 Units, InterCATHeter, DIALYSIS PRN, Ester Watkins DO, 1,500 Units at 03/02/23 0735    balsam peru-castor oiL (VENELEX) ointment, , Topical, BID, Javier Warren MD, Given at 03/02/23 1039    acetaminophen (TYLENOL) solution 650 mg, 650 mg, Oral, Q4H PRN, Connie Pierson MD, 650 mg at 02/20/23 0401    acetaminophen (TYLENOL) suppository 650 mg, 650 mg, Rectal, Q4H PRN, Connie Pierson MD, 650 mg at 02/17/23 2013    HYDROmorphone (DILAUDID) injection 0.5 mg, 0.5 mg, IntraVENous, Q3H PRN, Coleen MOLINA MD, 0.5 mg at 02/22/23 2225    HYDROmorphone (DILAUDID) injection 1 mg, 1 mg, IntraVENous, Q4H PRN, Coleen MOLINA MD, 1 mg at 02/28/23 0557    heparin 25,000 units in D5W 250 ml infusion, 12-25 Units/kg/hr, IntraVENous, TITRATE, Malissa Gar MD, Last Rate: 8.8 mL/hr at 03/02/23 0826, 13 Units/kg/hr at 03/02/23 0826    albumin human 25% (BUMINATE) solution 12.5 g, 12.5 g, IntraVENous, Q6H, Malissa Gar MD, Last Rate: 60 mL/hr at 02/27/23 0020, 12.5 g at 03/02/23 0500    Warfarin - MD/NP dosing, , Other, Rx Dosing/Monitoring, Malissa Gar MD    [facility] injection 0.5 mg, 0.5 mg, IntraVENous, Q4H PRN, Malissa Gar MD, 0.5 mg at 02/17/23 1431    glucose chewable tablet 16 g, 4 Tablet, Oral, PRN, Shaila, Lizette B, NP    glucagon (GLUCAGEN) injection 1 mg, 1 mg, IntraMUSCular, PRN, Shaila, Lizette B, NP    sodium chloride (NS) flush 5-40 mL, 5-40 mL, IntraVENous, Q8H, Shaila, Lizette B, NP, 10 mL at 03/02/23 0501    sodium chloride (NS) flush 5-40 mL, 5-40 mL, IntraVENous, PRN, Shaila, Lizette B, NP, 40 mL at 02/17/23 1818    naloxone (NARCAN) injection 0.4 mg, 0.4 mg, IntraVENous, PRN, Shaila, Lizette B, NP    ELECTROLYTE REPLACEMENT NOTE: Nurse to review Serum Potassium and Magnesuim levels and Initiate Electrolyte Replacement Protocol as needed, 1 Each, Other, PRN, Shaila, Lizette B, NP    dextrose 10 % infusion 0-250 mL, 0-250 mL, IntraVENous, PRN, Shaila, Lizette B, NP, Last Rate: 150 mL/hr at 02/24/23 0220, 75 mL at 02/24/23 0220    acetaminophen (TYLENOL) tablet 650 mg, 650 mg, Oral, Q6H PRN, Shaila, Lizette B, NP, 650 mg at 02/17/23 0200    ondansetron (ZOFRAN) injection 4 mg, 4 mg, IntraVENous, Q4H PRN, Shaila, Lizette B, NP    albuterol-ipratropium (DUO-NEB) 2.5 MG-0.5 MG/3 ML, 3 mL, Nebulization, Q6H PRN, Shaila, Lizette B, NP    [Held by provider] aspirin chewable tablet 81 mg, 81 mg, Oral, DAILY, Shaila, Lizette B, NP, 81 mg at 02/17/23 0942    senna-docusate (PERICOLACE) 8.6-50 mg per tablet 1 Tablet, 1 Tablet, Oral, DAILY, Shaila, Lizette B, NP, 1 Tablet at 03/01/23 1359    polyethylene glycol (MIRALAX) packet 17 g, 17 g, Oral, DAILY, Shaila, Lizette B, NP, 17 g at 03/01/23 1359    bisacodyL (DULCOLAX) suppository 10 mg, 10 mg, Rectal, DAILY PRN, Shaila, Lizette B, NP, 10 mg at 02/22/23 0839    0.45% sodium chloride infusion, 10 mL/hr, IntraVENous, CONTINUOUS, Kamila Wilson MD, Last Rate: 10 mL/hr at 03/02/23 0824, 10 mL/hr at 03/02/23 0824    DOBUTamine (DOBUTREX) 500 mg/250 mL (2,000 mcg/mL) infusion, 0-10 mcg/kg/min, IntraVENous, TITRATE, Edson Johnson MD, Stopped at 02/23/23 1044    dexmedeTOMidine in 0.9 % NaCl (PRECEDEX) 400 mcg/100 mL (4 mcg/mL) infusion soln, 0.1-1.5 mcg/kg/hr, IntraVENous, TITRATE, Edson Johnson MD, Stopped at 03/02/23 0941    0.9% sodium chloride infusion, 9 mL/hr, IntraVENous, CONTINUOUS, Edson Johnson MD, Last Rate: 11 mL/hr at 03/02/23 0335, 11 mL/hr at 03/02/23 0335    EPINEPHrine (ADRENALIN) 10 mg in 0.9% sodium chloride 250 mL infusion, 0-10 mcg/min, IntraVENous, TITRATE, Lizette Linton, NP, Last Rate: 4.5 mL/hr at 03/02/23 0825, 3 mcg/min at 03/02/23 0825    PATIENT CARE TEAM:  Patient Care Team:  Berny Cedeno MD as PCP - General (Family Medicine)  Berny Cedeno MD as PCP - [facility] Empaneled Provider  Jan Mckeon MD (Cardiovascular Disease Physician)  Porsha Lebron MD (Gastroenterology)  Jewel Woody MD (Cardiothoracic Surgery)  Nathen Thomas MD (Cardiovascular Disease Physician)  Jacki Rayo Ziggy Crowder MD (Nephrology)  Paolo Velazquez MD (Pulmonary Disease)  Kary Beaver MD ([address] [facility])     Thank you for allowing me to participate in this patient's care.     Leon Willis MD   [address] [place] [address], [address]  Phone: [phone]    Critically ill  Critical care 45 min

## 2023-03-02 NOTE — DIABETES MGMT
[facility]  DIABETES MANAGEMENT CONSULT    Consulted by  Ivin Buerger, MD   for advanced nursing evaluation and care for inpatient blood glucose management. Evaluation and Action Plan   Bayron Carvajal is a 71year old gentleman, with Type 2 Diabetes with a recent A1C of 7.7%, who is admitted with arrhythmias and acute on chronic HFrEF with failure to respond to inotrope support. He was discharged one week prior from a prolonged hospital stay for acute on chronic HF and LVAD work-up and discharged home on dobutamine with a lifevest.  Glucose control was tenuous during his previous admission s/t multiple co-morbidities, several tests/procedures requiring NPO status, waxing and waining oral intake. At this time glucose is impacted by:  Heart Failure with reduced EF 25-30%, LVAD implant and RVAD  Vasopressor use: weaning  TANNER on CVVHD  Enteral Feeds    This admission, he required low basal doses but high bolus doses with meals to offset pre-prandial hyperglycemia. An insulin gtt has been used for glycemic control post-operatively and insulin rates have been erratic over the last 2 weeks. His insulin rates have been stable over the last 4 days transitioned off to SQ basal/correction insulin. BG target is now at goal of 140-180mg/dl. Action Plan  Continue NPH to 16 units Q12 as BG trended below goal. (20% dose reduction). Hold if feeds are held    Humalog at high sensitivity Q4h            Initial Presentation   Bayron Carvajal is a 71 y.o. male who presented to the ED 1/26/23 with lower extremity swelling and difficulty breathing. He had a prolonged hospital admission from 12/22/22-1/19/23 for acute on chronic systolic HF and LVAD work-up. He was discharged with home inotrope. LAB: , GFR 58, Trop 2003, BNP 4524  CXR: New diffuse bilateral increased interstitial markings, partially obscuring bilateral pulmonary nodules.      HX:   Past Medical History:   Diagnosis Date    CAD (coronary artery disease) 11/10/2016    NSTEMI & 2 stents    Deafness 10/28/2012    DM (diabetes mellitus) ([facility]     Elevated cholesterol     Hypertension     NSTEMI (non-ST elevated myocardial infarction) ([facility] 11/10/2016        INITIAL DX:   CHF (congestive heart failure) ([facility] [I50.9]     Current Treatment     TX: Milrinone, Amio. Specialty consultations: [facility], Cardiology, EP, GI     Hospital Course   Clinical progress has been complicated by:    1/34: Admission. Rapid response for SVT- Given adenosine x2, amio bolus/gtt  1/27: Advanced HFC consult. EP consult ordered. Intolerance of inotropic support. Cardiology consult. NSTEMI, heparin gtt started. Seen by EP: Continue amio. 1/28: Febrile: CT chest 1/28 shows diffuse focal opacities concerning for pneumonia. Obtain blood cultures, UA. Pathology report 1/18/23: well differentiated neuroendocrine tumor grade 1. EGD: Patchy erythema gastric body, biopsies done. 6 mm sessile polyp gastric body biopsied  1/30: Oncology consult: Recommend multiphase CT of the abdomen and pelvis to evaluate for metastatic spread. Tachycardia and SOB, BiPAP overnight. 2/3: Accepted for VAD implant if renal fx improves per [facility]. CCU Transfer and swan placed  2/4: PRBC transfusion   2/6: With increased dyspnea. Doubtamine started  2/15: LVAD Implant  2/17: Extubated. CRRT started. ECHO with persistent RV failure. OR for RV impella. Remained Intubated post-op  2/20: UA with large leuk esterase and 2+ bacteria. Urine Cx with yeast  2/21: PRBC transfusion, PLT transfusion    2/22: Bronch   3/2: Code Stroke. Head CT Occlusion distal right anterior cerebral artery, with associated small area of  matched perfusion defect and cortical enhancement, consistent with subacute infarct. Diminutive and irregular right vertebral artery, occluded at the V3-4 junction, age indeterminate extensive multifocal atherosclerosis in the  intracranial and extracranial circulation.  Partly visualized life support lines and tubes. Postsurgical changes in the chest partly visualized. Bilateral pleural effusions. No venous thrombus. Diabetes History   Type 2 Diabetes  Ambulatory BG management provided by: PCP Berny Cedeno MD    Diabetes-related Medical History  Acute complications  Acute hyperglycemia  Neurological complications  Peripheral neuropathy  Microvascular disease  Nephropathy  Macrovascular disease  CAD  Other associated conditions                                       CHF     Diabetes Medication History  Key Antihyperglycemic Medications        Diabetes Medication History  Key Antihyperglycemic Medications               metFORMIN (GLUCOPHAGE) 500 mg tablet (Taking/) Take 1 Tablet by mouth two (2) times daily (with meals) for 30 days.     glipiZIDE (GLUCOTROL) 5 mg tablet (Taking) Take 1 tablet by mouth twice daily    Januvia 50 mg tablet (Taking) Take 1 tablet by mouth once daily             Diabetes self-management practices:   Eating pattern                Eats 3 small meals daily  [x]         Breakfast                         2 Eggs, Coffee  [x]         Lunch                               [place]  [x]         Dinner                              \"[place] [employer]\" Bread, rice, lentils   [x]         Bedtime                           [place]  [x]         Snacks                              Afternoon snack  [x]         Beverages                        Water, Coffee  Physical activity pattern                Sedentary   Monitoring pattern  Discharged last week with a Carolynn CGM and serum glucometer  7 day average Ba-9a: 129-164  9a-12: 243  Noon to 9p: 198-238  1 low BG in the last week overnight    Taking medications pattern  [x]         Consistent administration  [x]         Affordable  Social determinants of health impacting diabetes self-management practices   Concerned that you need to know more about how to stay healthy with diabetes  Overall evaluation:                 [x] Achieving A1c target with drug therapy & self-care practices    Subjective     Objective   Physical exam  General Underweight Carlos See ([facility]) male in critical condition in CVICU. Intubated. LVAD. CVVHD  Neuro  Eyes open today with SBT  Vital Signs Visit Vitals  BP (!) 90/59   Pulse 94   Temp 98.8 °F (37.1 °C)   Resp 19   Ht 5' 6\" (1.676 m)   Wt 62.8 kg (138 lb 7.2 oz)   SpO2 100%   BMI 22.35 kg/m²     Skin  Warm and dry. No acanthosis noted along neckline. Sternal surgical incision. Chest tubes. LVAD  Heart   Regular rate and rhythm.  No murmurs, rubs or gallops  Lungs  Clear to auscultation without rales or rhonchi  Extremities No foot wounds        Laboratory  Recent Labs     03/02/23  0346 03/01/23  1211 03/01/23  0437 02/28/23  1614 02/28/23  0339   *  --  133* 122* 174*   AGAP 9  --  9 7 7   TRIGL  --  218*  --   --   --    WBC 6.7  --  8.4  --  8.8   CREA 1.62*  --  1.59* 1.61* 1.64*   *  --  130*  --  105*   ALT 50  --  52  --  37         Factors impacting BG management  Factor Dose Comments   Nutrition:  Standard meals     Enteral feeds  Vital 1.5 @ 40 ml/hr  165g CHO/24h      Heart Failure/Cardiogenic Shock HeartMate 3 LVAD  Dobutamine: Off  Epinephrine weaning  Norepi: Off  RVAD off    Lactic Acidosis Resolved    Septic Shock UTI/PNA  Urine Cx 2/20 with yeast  IV antibiotics   Fevers    Other:   Kidney function TANNER on CKD:   Current GFR 46  CVVHD    Shock Liver LFTs- improving      Blood glucose pattern    Significant diabetes-related events over the past 24-72 hours  A1C 7.7% 1/11/23  Pre-op: Basal: Lantus 6 units daily and Bolus: 10 units Humalog/meal  Insulin gtt:   2/15: 2.8 units  2/16: 32.8 units  2/17: 23.1 units  2/18: 19.9 units  2/19: 8.7 units  2/20: 53.6 units  2/21: 99 units   2/22: 94.9 units  2/23: 69.1 units  2/24: 46.2  2/25: 57.5 units  2/26: 48 units  2/27: 42 units since MN    Gtts: Epi (2)  NPH: 16 units Q12  Correction: 2 units in the last 24h  Off insulin gtt: BG 80-26  RVAD weaned off    Assessment and Nursing Intervention   Nursing Diagnosis Risk for unstable blood glucose pattern   Nursing Intervention Domain 9168 Decision-making Support   Nursing Interventions Examined current inpatient diabetes/blood glucose control   Explored factors facilitating and impeding inpatient management  Explored corrective strategies with patient and responsible inpatient provider   Informed patient of rational for insulin strategy while hospitalized     Billing Code(s)   No charge    Before making these care recommendations, I personally reviewed the hospitalization record, including notes, laboratory & diagnostic data and current medications, and examined the patient at the bedside (circumstances permitting) before determining care. More than fifty (50) percent of the time was spent in patient counseling and/or care coordination.   Total minutes: 13    SLICK Guadalupe  Diabetes Clinical Nurse Specialist  [facility]  Access via [employer]se

## 2023-03-02 NOTE — PROGRESS NOTES
03/02/23 1147   Weaning Parameters   Spontaneous Breathing Trial Complete Yes   Resp Rate Observed 20   Ve 7.3      RSBI 92       Patient extubated to Bipap per MD order. RN at bedside.

## 2023-03-02 NOTE — CONSULTS
80yo s/p LVAD 2/15 with post-op RV failure. He was on anticoagulation and had CVA seen on CT early this am. CT also showed small right parietal parafalcine sah. Repeat CT shows increase in sah. No surgical intervention. Recommend hold anticoagulation until sah has resolved on CT (may take several days).

## 2023-03-02 NOTE — DIALYSIS
CRRT / [phone]    Orders   Mode: CVVHD   Blood Flow Rate: 200 ml/min   Prismasol Dose: 1750 ml/hr    Prismasol Concentrate: 4K 2.5 CA   Blood Warmer Temp: 37   Net Fluid Removal: 0     Metrics   BP: 124/72   HR: 99   Access Pressure: -29   Filter Pressure: 142   Return Pressure: 59   TMP: 15   Pressure Drop: 48     Access   Type & Location: LIJ temporary non-tunneled CVC, transparent, bacteriocidal dressing C/D/I and dated 2/28/2023. No signs of redness, drainage, or infection visualized. Each catheter limb disinfected for 60 seconds per limb with alcohol swabs. Caps removed, dialysis CVC hub scrubbed with Prevantics for 15 seconds, followed by a 5 second dry time per Hospital P&P. +asp/+flush x 2 ports. Comments:                                        Labs   HBsAg (Antigen) / date: Negative 2/18/2023                                             HBsAb (Antibody) / date: Susceptible 2/18/2023   Source: Epic     Safety:   Time Out Done:   (Time) 0200   Consent obtained/signed: Verified   Education: Access/Site Care   Primary Nurse Rpt Pre: Aidan Matthew RN   Primary Nurse Rpt Post: Aidan Matthew RN     Comments / Plan:      RN at bedside to change filter. Pt was rinsed back for travel to CT. All possible blood returned by the primary RN. Patient, code status, labs, and orders verified. Consents on chart. Time out completed. Old set discarded in red biohazard bin. HF-1000 filter set-up, primed w/ 1L NS, tested and running well. Lines visible and connections secure with blood warmer to return line at 37*C. Education, pre and post to primary RN.

## 2023-03-02 NOTE — ADT AUTH CERT NOTES
Comment          Patient Demographics    Patient Name   Josey Sanders   [id number] Legal Sex   Male    1950 Address   [address] [facility] [place]  Phone   [phone] (Home)   [phone] (Mobile) *Preferred*     Patient Demographics    Patient Name   Josey Sanders   [id number] Legal Sex   Male    1950 Address   [address] Phone   [phone] (Home)   [phone] (Mobile) *Preferred*     CSN:   [id number]     [address] [place] [address] Date: Admit Time Room Bed   2023  4:17  0787     Attending Providers    Provider Pager From To   Billie German MD  23   Saleem Dias MD  23   Keely Raieny MD  23   Saleem Dias MD  23   Keely Rainey MD  23   Marcus Molina MD  23   Peg Gar MD  23      Patient Contacts    Name Relation Home Work Mobile   Susanna Kendall Spouse   [phone]   [address] [place] [address] Daughter [phone]   Hal Kendall Son [phone]     Mike Locket Daughter-in-Law [phone]      Utilization Reviews       Heart Failure - Care Day 28 (3/1/2023) by Yuriy Stone       Review Entered Review Status   3/2/2023 1037 Completed      Criteria Review      Care Day: 35 Care Date: 3/1/2023 Level of Care: ICU    Guideline Day 2    Level Of Care    ( ) Floor    3/2/2023 10:37:46 EST by Yuriy Stone      3/1 icu    Clinical Status    (X) * Hemodynamic stability    3/2/2023 10:37:46 EST by Jaime Dotson      HR: 96 BP: 109/57    ( ) * Mental status at baseline    3/2/2023 10:37:46 EST by Jaime Dotson      intubated and sedated    ( ) * No evidence of myocardial ischemia    3/2/2023 10:37:46 EST by Jaime Dotson      NT pro-BNP: 13,181 (H)    ( ) * Cardiac rate and rhythm acceptable    3/2/2023 10:37:46 EST by Jaime Dotson      1\" AV Block;    ( ) * Oxygenation at baseline or improved    3/2/2023 10:37:46 EST by Jaime Dotson      on vent FIO2 40%    ( ) * Pulmonary edema absent or improved    3/2/2023 10:37:46 EST by Jaime Dotson      CXR:  1. ET tube is in satisfactory position. 2. Mild interstitial edema, unchanged. Activity    ( ) Advance activity as tolerated    3/2/2023 10:37:46 EST by Jaime Dotson      bedrest    Routes    ( ) Oral diet    3/2/2023 10:37:46 EST by Jaime Dotson      NPO, ADULT TUBE FEEDING Orogastric; Other Tube Feeding (Specify); Vital 1.5; Delivery Method: Continuous    Interventions    (X) * Pulmonary catheter absent    (X) Weigh    3/2/2023 10:37:46 EST by Jaime Dotson      61.2 kg    (X) Possible electrolytes    3/2/2023 10:37:46 EST by Jaime Dotson      Calcium: 10.8 (H)  Phosphorus: 2.2 (L)  Magnesium: 2.5 (H)  Sodium: 137  Potassium: 3.2 (L)  Chloride: 104    (X) Possible CXR, ECG, BNP    (X) Oxygen    * Milestone   Additional Notes   3/1/23 LOC IP ICU      Pertinent Updates:   -S/p  Impella RP removal           Anesthesia: Propofol   Estimated Blood Loss:  5 cc      Vitals:   65.3 80 109/57 21 72% vent FIO2 40%   T: 98.4,81,77.9,100.2   RR: 14,16,18,0   O2 sat%: 99,80,96      Abnl/Pertinent Labs/Radiology:   aPTT: 79.1 (H), 66.6 (H), 89.7 (HH)   Triglyceride: 218 (H)   WBC: 8.4   RBC: 2.92 (L)   HGB: 8.9 (L)   HCT: 27.3 (L)   RDW: 27.7 (H)   IMMATURE GRANULOCYTES: 1 (H)   ABS. IMM. GRANS.: 0.1 (H)   ABS. EOSINOPHILS: 0.5 (H)   INR: 1.5 (H)   Prothrombin time: 15.1 (H)   Glucose:  136 (H), 118 (H),133 (H), 169 (H),147 (H), 122 (H), 189 (H)   Creatinine: 1.59 (H)   eGFR: 46 (L)   Bilirubin, total: 15.0 (H)   Protein, total: 6.0 (L)   AST: 130 (H)   Alk.  phosphatase: 122 (H)   LD: 612 (H)   Lactic acid: 1.7   Procalcitonin: 2.61   Calcium, ionized (POC): 1.46 (H)   pH (POC): 7.45   pCO2 (POC): 36.0   pO2 (POC): 99   HCO3 (POC): 24.9   sO2 (POC): 98.0 (H)   Base excess (POC): 1.0   -XR Abd KUB:   Dobbhoff tip in distal stomach   Gasless appearance of the abdomen      Physical Exam:   Constitutional:        Appearance: He is ill-appearing. Interventions: He is sedated and intubated. Cardiovascular:       Rate and Rhythm: Normal rate and regular rhythm. Comments: LVAD Humm noted on auscultation    Pulmonary:       Effort: Pulmonary effort is normal. No respiratory distress. He is intubated. Breath sounds: Rhonchi present. Comments: Coarse lung sounds   Abdominal:       General: Bowel sounds are decreased. There is no distension. Comments: Driveline exit site with dressing C/D/I    Musculoskeletal:       Right lower le+ Pitting Edema present. Left lower le+ Pitting Edema present. Comments: Left groin Impella in place, dressing C/D/I    Skin:      Capillary Refill: Capillary refill takes more than 3 seconds. Coloration: Skin is jaundiced. MD Consults/Assessments & Plans:   Per Cardiothoracic Surgery:   Cardiogenic Shock    LFTs 100's   RV failure   RVAD removed   Continues on Epi 3   Renal failure    Re-starting factor        Per Nephrology:   Seen on CVVH .   Factor rate->  0   Switch to 4 K Prismasol bags     IV  Phos   Daily CRRT labs    BRIGHT   PRBC as per cardiac team      Per Cardiovascular Surgery:   Continue LVAD at 4500rpm   Continuous NICOMs; change patches every other day   Goals today is to keep CVP 12-14   No beta-blockers due to hypotension and RV conditioning prior to LVAD   Cannot tolerate ARB/ARNi due to hypotension and upcoming surgical procedure    Continue antibiotics, zosyn, vanc, flagyl and diflucan   Continue colchicine 0.6mg daily for possible pericarditis   Hold ASA d/t thrombocytopenia- improving    Continue coumadin 1mg now and 1mg in PM, INR goal 2-3 and heparin gtt   HIT panel negative    Continue bowel regimen    Daily dressing changes to Drive line exit site   Pulmonary hygiene    Timing of extubation per intensivist- would cont SBT for now   D/w intensivist and bedside staff      Medications:   Iv 0.45% NaCl 10 mL/hr continuous   Iv NS 9 mL/hr continuous   Iv buminate 12.5 gm q6h   Cathflo 3 mg titrate    Extracorporeal prismasol 1750 mL/hr continuous   Po colchicine 0.6 mg daily   Iv precedex drip titrate    Iv andrenalin drip titrate    Iv diflucan 400 mg q24h   Iv heparin drip titrate    Iv hydromorphone 1 mg/hr continuous   Sc insulin NPH 16U q12h   Iv cardene drip  titrate    Iv levophed drip titrate    Iv zosyn 3.375 gm q8h   Po miralax 17 gm daily   Iv diprivan drip titrate    Po pericolace 8.6-50 mg 1 tablet daily   Iv vancocin 750 mg q24h   Iv buminate 12.5 gm given x2   Iv Kcl 20 meq q1h given x3   Iv sodium phosphate 20 mmol given x1   Po coumadin 1 mg given x2      Orders:   am labs, CXR daily, continuous oximetry, wound care daily, glucose checks QID, turn pt q2h, I&O every hour, monitor MAP, weight daily, elevate hob, SCDs       Per Diabetes Management:   Reduced NPH to 16 units Q12 as BG trended below goal. (20% dose reduction)   Humalog at high sensitivity Q4h      Nutrition Recommendations/Plan:     Continue TF via NG of Vital 1.5 @ 40 mL/hr with 1 packet Prosource TID and flushes of 30 mL H2O q 4 hours        2/24 attending by Duglas Polo RN       Review Entered Review Status   3/1/2023 1322 In Primary      Criteria Review   24 HOUR EVENTS:   No acute events      NEUROLOGICAL:    -No focal deficits  -No weakness  -Daily awakening trials     PULMONOLOGY: Acute hypoxic respiratory failure   -Continue to monitor oxygen saturations  -Continue mechanical ventilation   -Wean as tolerated   -Daily spontaneous breathing trials   -Chest x-ray reviewed   -ABG reviewed      CARDIOVASCULAR: STEMI, multivessel disease, acute on chronic congestive heart failure   Status post LVAD   -Flows 3-4    -Speed 4600  Status post Impella RP placement, P6   -Purge flow 3.6  -Echo reviewed  -probnp 10,977  -Continue to monitor hemodynamic status  -David@AVST  -Norepinephrine@4  -proBNP 4433 slightly elevated compared to yesterday  -Wean as tolerated  -EF 20 to 25%  -Continue heparin  -CVP goal 10-12  -Mean arterial pressure goal greater than 70  -Continue to monitor chest tube output     GASTROINTESTINAL:   Continue enteral feeds     RENAL/ELECTROLYTE/FLUIDS: TANNER, chronic kidney disease   -Continue to monitor urine output   -Continue CRRT    -Removal right 50cc/hour  -Place electrolytes as indicated  -Nephrology following  strict I/Os,   Continue CRRT    -pull off 50 cc/hr of fluid  -Vasquez in place     ENDOCRINE:   -Continue to monitor blood glucose levels   -Maintain blood glucose levels between 140-180  -Continue IV insulin

## 2023-03-02 NOTE — CONSULTS
NeuroIogy Consult  ROGER Vang    Patient: Mary Alfonso MRN: [id number]  SSN: [id number]    YOB: 1950  Age: 71 y.o. Sex: male      Chief Complaint: Left arm weakness    HPI:   71 y.o.   M w/ pmh of ICM, CAD s/p PCIx2, HFrEF NYHA stage D, class 3B, HTN, HLD, TRISTAN, and CKD3. He presented to [facility] on 1/26/23 for HF exacerbation following recent admission in 12/2022 for cardiogenic shock. He was placed on home IV milrinone as bridge to LVAD placement. At the beginning of this stay he developed -190s and started on IV amiodarone. S/p LVAD placement on 2/17/23. Post-op complications include failed extubation, right heart failure prompting Impella placement as well as CRRT for kidney failure. 3/1, 3/2 - Code stroke called at apprx 2355 for new or worsening LUE weakness and unequal pupils; LKWT 0800 per nursing staff. CTH revealed evidence of hemorrhage in superior right frontal lobe. CTA/CTP showing occlusion at distal right SUPA, subacute infarction. Unable to take pt for MRI brain r/t LVAD. Repeat CTH this morning shows evolving medial, right fronto-parietal hemorrhage. Neurology consulted for evaluation of cerebral hemorrhage. Past Medical History:   Diagnosis Date    CAD (coronary artery disease) 11/10/2016    NSTEMI & 2 stents    Deafness 10/28/2012    DM (diabetes mellitus) ([facility] [address]     Elevated cholesterol     Hypertension     NSTEMI (non-ST elevated myocardial infarction) ([facility] [address] 11/10/2016     Family History   Problem Relation Age of Onset    Heart Disease Father     Heart Attack Father     Hypertension Mother     Elevated Lipids Brother     Elevated Lipids Brother     No Known Problems Sister     Elevated Lipids Brother     No Known Problems Son     No Known Problems Daughter     Anesth Problems Neg Hx      Social History     Tobacco Use    Smoking status: Never     Passive exposure: Never    Smokeless tobacco: Never   Substance Use Topics    Alcohol use:  Yes Alcohol/week: 2.0 standard drinks     Types: 1 Cans of beer, 1 Shots of liquor per week     Comment: rarely      Prior to Admission Medications   Prescriptions Last Dose Informant Patient Reported? Taking? Januvia 50 mg tablet   No Yes   Sig: Take 1 tablet by mouth once daily   aspirin delayed-release 81 mg tablet   Yes Yes   Sig: Take 81 mg by mouth daily. ergocalciferol (ERGOCALCIFEROL) 1,250 mcg (50,000 unit) capsule   No Yes   Sig: Take 1 Capsule by mouth every seven (7) days. Patient taking differently: Take 50,000 Units by mouth every seven (7) days.    finasteride (PROSCAR) 5 mg tablet   No Yes   Sig: Take 1 Tablet by mouth daily (with dinner) for 30 days. furosemide (LASIX) 20 mg tablet   No Yes   Sig: Take 1 Tablet by mouth daily as needed (for weight greater than 120 lb by 2-3 lb or shortness of breath). glipiZIDE (GLUCOTROL) 5 mg tablet   No Yes   Sig: Take 1 tablet by mouth twice daily   ipratropium (ATROVENT) 21 mcg (0.03 %) nasal spray   No Yes   Si Sprays by Both Nostrils route every twelve (12) hours. metFORMIN (GLUCOPHAGE) 500 mg tablet   No Yes   Sig: Take 1 Tablet by mouth two (2) times daily (with meals) for 30 days. nitroglycerin (NITROSTAT) 0.4 mg SL tablet   No No   Si Tablet by SubLINGual route every five (5) minutes as needed for Chest Pain. Up to 3 doses. pantoprazole (PROTONIX) 40 mg tablet   No Yes   Sig: Take 1 Tablet by mouth Daily (before breakfast) for 30 days. polyethylene glycol (Miralax) 17 gram/dose powder   Yes Yes   Sig: Take 17 g by mouth daily as needed for Constipation. rosuvastatin (CRESTOR) 20 mg tablet   No Yes   Sig: Take 1 Tablet by mouth nightly. zolpidem (AMBIEN) 5 mg tablet   No No   Sig: Take 1 Tablet by mouth nightly as needed for Sleep. Max Daily Amount: 5 mg.       Facility-Administered Medications: None       Allergies   Allergen Reactions    Ambien [Zolpidem] Other (comments)     Causes extreme confusion     Review of Systems:  PRASHANT given pt factors. At time of my exam, pt in respiratory failure requiring re-intubation. Objective:   Visit Vitals  BP (!) 90/59   Pulse 100   Temp 98.4 °F (36.9 °C)   Resp 20   Ht 5' 6\" (1.676 m)   Wt 62.8 kg (138 lb 7.2 oz)   SpO2 98%   BMI 22.35 kg/m²     Physical Exam:  GENERAL: Ill appearing, tachypneic  SKIN: Warm, dry, color appropriate for ethnicity. Neurologic Exam:  Mental Status:    Alert, PRASHANT orientation status. Speech/Language:      PRASHANT    Cranial Nerves:     Pupils 3 mm, equal, round and briskly reactive to light. Visual fields - PRASHANT  Extraocular movements - PRASHANT    Facial sensation - PRASHANT  Full facial strength - PRASHANT  Chronic hearing impairment; right ear worse than left    Motor:      Unable to complete thorough motor exam.  Bilateral lower extremities seen moving to stimulus.     Sensation:      Sensation intact throughout to light touch    Coordination:   PRASHANT    Gait:   Deferred    Labs:  Recent Results (from the past 12 hour(s))   NT-PRO BNP    Collection Time: 03/02/23  3:46 AM   Result Value Ref Range    NT pro-BNP 12,844 (H) <125 PG/ML   MAGNESIUM    Collection Time: 03/02/23  3:46 AM   Result Value Ref Range    Magnesium 2.7 (H) 1.6 - 2.4 mg/dL   LD    Collection Time: 03/02/23  3:46 AM   Result Value Ref Range     (H) 85 - 241 U/L   PROTHROMBIN TIME + INR    Collection Time: 03/02/23  3:46 AM   Result Value Ref Range    INR 1.7 (H) 0.9 - 1.1      Prothrombin time 17.1 (H) 9.0 - 11.1 sec   PHOSPHORUS    Collection Time: 03/02/23  3:46 AM   Result Value Ref Range    Phosphorus 3.9 2.6 - 4.7 MG/DL   METABOLIC PANEL, COMPREHENSIVE    Collection Time: 03/02/23  3:46 AM   Result Value Ref Range    Sodium 135 (L) 136 - 145 mmol/L    Potassium 4.4 3.5 - 5.1 mmol/L    Chloride 104 97 - 108 mmol/L    CO2 22 21 - 32 mmol/L    Anion gap 9 5 - 15 mmol/L    Glucose 189 (H) 65 - 100 mg/dL    BUN 25 (H) 6 - 20 MG/DL    Creatinine 1.62 (H) 0.70 - 1.30 MG/DL    BUN/Creatinine ratio 15 12 - 20      eGFR 45 (L) >60 ml/min/1.73m2    Calcium 10.8 (H) 8.5 - 10.1 MG/DL    Bilirubin, total 13.6 (H) 0.2 - 1.0 MG/DL    ALT (SGPT) 50 12 - 78 U/L    AST (SGOT) 109 (H) 15 - 37 U/L    Alk. phosphatase 112 45 - 117 U/L    Protein, total 6.3 (L) 6.4 - 8.2 g/dL    Albumin 4.0 3.5 - 5.0 g/dL    Globulin 2.3 2.0 - 4.0 g/dL    A-G Ratio 1.7 1.1 - 2.2     PROCALCITONIN    Collection Time: 03/02/23  3:46 AM   Result Value Ref Range    Procalcitonin 2.33 ng/mL   LACTIC ACID    Collection Time: 03/02/23  3:46 AM   Result Value Ref Range    Lactic acid 2.0 0.4 - 2.0 MMOL/L   CBC WITH AUTOMATED DIFF    Collection Time: 03/02/23  3:46 AM   Result Value Ref Range    WBC 6.7 4.1 - 11.1 K/uL    RBC 2.69 (L) 4.10 - 5.70 M/uL    HGB 8.3 (L) 12.1 - 17.0 g/dL    HCT 26.5 (L) 36.6 - 50.3 %    MCV 98.5 80.0 - 99.0 FL    MCH 30.9 26.0 - 34.0 PG    MCHC 31.3 30.0 - 36.5 g/dL    RDW 29.1 (H) 11.5 - 14.5 %    PLATELET 683 193 - 807 K/uL    MPV 12.1 8.9 - 12.9 FL    NRBC 0.0 0  WBC    ABSOLUTE NRBC 0.00 0.00 - 0.01 K/uL    NEUTROPHILS 81 (H) 32 - 75 %    LYMPHOCYTES 6 (L) 12 - 49 %    MONOCYTES 5 5 - 13 %    EOSINOPHILS 5 0 - 7 %    BASOPHILS 1 0 - 1 %    IMMATURE GRANULOCYTES 2 (H) 0.0 - 0.5 %    ABS. NEUTROPHILS 5.5 1.8 - 8.0 K/UL    ABS. LYMPHOCYTES 0.4 (L) 0.8 - 3.5 K/UL    ABS. MONOCYTES 0.3 0.0 - 1.0 K/UL    ABS. EOSINOPHILS 0.3 0.0 - 0.4 K/UL    ABS. BASOPHILS 0.1 0.0 - 0.1 K/UL    ABS. IMM.  GRANS. 0.1 (H) 0.00 - 0.04 K/UL    DF SMEAR SCANNED      RBC COMMENTS ANISOCYTOSIS  1+        RBC COMMENTS TARGET CELLS  PRESENT        RBC COMMENTS HYPOCHROMIA  PRESENT       GLUCOSE, POC    Collection Time: 03/02/23  3:46 AM   Result Value Ref Range    Glucose (POC) 185 (H) 65 - 117 mg/dL    Performed by [employer]    POC EG7    Collection Time: 03/02/23  5:54 AM   Result Value Ref Range    Calcium, ionized (POC) 1.43 (H) 1.12 - 1.32 mmol/L    pH (POC) 7.40 7.35 - 7.45      pCO2 (POC) 37.1 35.0 - 45.0 MMHG    pO2 (POC) 108 (H) 80 - 100 MMHG    HCO3 (POC) 22.8 22 - 26 MMOL/L    Base deficit (POC) 1.8 mmol/L    sO2 (POC) 98.2 (H) 92 - 97 %    Specimen type (POC) ARTERIAL     PTT    Collection Time: 03/02/23  7:19 AM   Result Value Ref Range    aPTT 75.1 (H) 22.1 - 31.0 sec    aPTT, therapeutic range     58.0 - 77.0 SECS   GLUCOSE, POC    Collection Time: 03/02/23  7:19 AM   Result Value Ref Range    Glucose (POC) 205 (H) 65 - 117 mg/dL    Performed by Teresa Sharpe, POC    Collection Time: 03/02/23 11:57 AM   Result Value Ref Range    Glucose (POC) 157 (H) 65 - 117 mg/dL    Performed by Pratik Miles    POC G3 - PUL    Collection Time: 03/02/23 11:58 AM   Result Value Ref Range    pH (POC) 7.41 7.35 - 7.45      pCO2 (POC) 33.3 (L) 35.0 - 45.0 MMHG    pO2 (POC) 62 (L) 80 - 100 MMHG    HCO3 (POC) 21.0 (L) 22 - 26 MMOL/L    sO2 (POC) 91.9 (L) 92 - 97 %    Base deficit (POC) 3.2 mmol/L    Specimen type (POC) ARTERIAL        Imaging (my read):  [facility] 3/2 0028: trace hemorrhage in superior right frontal lobe  CT 3/2 0758:  evolving sulcal hemorrhage in right fronto-parietal area        Assessment/Plan:   1.) SAH. Right medial, fronto-parietal hemorrhage, Dickinson Grade 2. Difficulty obtaining complete neuro exam r/t to acuity at this time. CTA would be helpful, however we'll leave decision to primary team given kidney failure. - Keppra 500 mg BID for seizure ppx   - Unable to obtain MRI brain r/t LVAD, Impella   - INR 1.7 today, coumadin discontinued   - Pt on heparin gtt. Cardiology/HF team to decide whether or not to hold   - Strict I&O              - SBP goal <140 mm hg.    - ECHO pending   - Assess pupils with pupillometer q 4 hours   - q 1 neuro checks   - Recommend NSGY consult   - Intensivist managing fluids, hemodynamic support       I have discussed the diagnosis and the intended plan as seen in the above orders with Dr. Russ Jiménez, Dr. Niru Moore  Thank you for this consult and participating in the care of this patient.     Signed By: ROGER Dorado, Neurocritical Care Nurse Practitioner    March 2, 2023       Attending Attestation  I reviewed and agree with the history, exam findings, impression, and plan of care recorded in the advanced practitioners note and addended the note. The patient's case and plan of care was formulated under my guidance and I made edits as needed in the above note. We will finish work up as mentioned in the above note. Additional comments are noted in my below documentation. It is a great pleasure to see Mary Beth Hilliard, a 71 y.o. male today in the hospital. Neuro images were reviewed. Neurological presentation consistent with sulcal hemorrhage in right fronto-parietal area. Keppra 500 mg BID. Neurosurgery consulted. SBP goal <140 mm hg. Antiplatelet/Anticoagulation per neurosurgery and primary team. Please continue the plan and management as mentioned in advanced practitioners note. I personally evaluated the patient and participated in key elements of management. I discussed problems, diagnosis and management with the advanced practitioner. As always, I greatly appreciate the opportunity to participate in the care of the patient.      Author: Tiki Mensah MD  as of: 3/2/2023  at: 3:35 PM

## 2023-03-02 NOTE — PROGRESS NOTES
Neurocritical Care Code Stroke Documentation      Symptoms: Unequal pupils, left upper extremity weakness   Baseline mRS:      Last Known Well: 0800am   Medical hx: Past Medical History:   Diagnosis Date    CAD (coronary artery disease) 11/10/2016    NSTEMI & 2 stents    Deafness 10/28/2012    DM (diabetes mellitus) ([facility] [address]     Elevated cholesterol     Hypertension     NSTEMI (non-ST elevated myocardial infarction) ([facility] [address]  Cardiomyopathy,   Heart failure LVEF 25-30%, LVAD  Acute on chronic renal failure currently on CVVHD 11/10/2016      Anticoagulation: Heparin through CVVHD, Coumadin for LVAD, INR 1.5   VAN:   Positive   NIHSS:   1a-LOC:2    1b-Month/Age:2    1c-Open/Close Hand:2    2-Best Gaze:0    3-Visual Fields:0    4-Facial Palsy:0    5a-Left Arm:4    5b-Right Arm:3    6a-Left Leg:3    6b-Right Leg:3    7-Limb Ataxia:0    8-Sensory:1    9-Best Language:2    10-Dysarthria:2    11-Extinction/Inattention:0  TOTAL SCORE:24   Imaging:   CT- Trace SAH in right convexity. CTA/CTP-Occlusion distal right anterior cerebral artery with associated small area of matched perfusion defect and cortical enhancement consistent with subacute infarct. Diminutive and irregular right VA, occluded at the V3-4 junction age indeterminate extensive multifocal atherosclerosis in the intracranial and extracranial circulation. Plan:   TNK Candidate: NO    Mechanical thrombectomy Candidate: NO  Recheck CTH in am  Patient cannot have MRI due to LVAD     *Perform dysphagia screening prior to any PO intake*    Discussed with: Dr. Erlinda Randhawa Teleneurology, Dr. Ngoc Coleman, Dr. Perry Hamlin, [facility]    Arrival time: 23:55  Time spent: 45 minutes.      Piyush Price NP  Neurocritical Care Nurse Practitioner  [phone]

## 2023-03-02 NOTE — PROGRESS NOTES
10:50am: SW greeted pt. In preparation for family meetin:00am: Family meeting with team, including Dr. Arsen De, [address]. [facility], and ICU nurse. Met with pt. Wife Joni Renner, and pt. Daughter along with  pt. Son via phone. Discussion:  Dr. Arsen De reviewed pt. Post-VAD medical HX including any developments in the past 24hours. Explained potential impact of medical developments on pt. Physical health outcomes and quality of life  Highlighted the need for neurology follow up   Dr. Arsen De encouraged the family to ask questions and discussed family's concerns as well as a plan to meet with family on approximately a weekly basis to continue providing the most recent information. Patient son inquired about possible plans if pt. Is not able to be extubated. Pt. Wife requested that the team continue to keep her informed of any developments. LVAD coordinator discussed plan to resume training for LVAD with pt. Family after pt. Has had additional time for recovery. SW plans to follow up with family for additional discussion about needs, concerns and resources later this afternoon after family has visited with the patient. After the family meeting, the patients' wife and daughter walked to patients' room. Salome Mccartney LMSW, LCSW Supervisee.    [facility] [facility], [address]  Phone: [phone]  Fax: [phone]

## 2023-03-02 NOTE — PROCEDURES
[facility]      Procedure Note - Intubation:   Performed by Ramos Wilson MD .   Staff Anesthesiologist/Intensivist    Diagnosis: Acute hypoxic respiratory failure  Insertion Date: 3/2/23 Time:1330   Obtained Consent? Y; emergent   Procedure Location:  [facility]ICU. Immediately prior to the procedure, the patient was reevaluated and found suitable for the planned procedure and any planned medications. Immediately prior to the procedure a time out was called to verify the correct patient, procedure, equipment, staff, and marking as appropriate. Medications given were etomidate and rocuronicum  A number 7.5 cuffed   ETT was placed to 23 cm at the teeth. Placement was evaluated by noting bilateral, symmetric breath sounds and good end-tidal CO2 detector color change . Attempts required: 1. Complications: none. Hypoxemia pre intubation On induction spo2 at 73. Dropped to 63 at lowest.  Hypotension after and treated with phenyl and epi push dose pressers  RSI was used. .  The procedure was tolerated moderately.

## 2023-03-02 NOTE — DIALYSIS
CR / [phone]    Orders   Mode: CVVHD   Blood Flow Rate: 200   Prismasol Dose: 1500 (New order per Dr. Karley Stearns)   Prismasol Concentrate: 4K 2.5 calcium   Blood Warmer Temp: 37   Net Fluid Removal: 0     Metrics   BP: 90/59   HR: 94   Access Pressure: -34   Filter Pressure: 134   Return Pressure: 54   TMP: 10   Pressure Drop: 44     Access   Type & Location: RIJ non tunneled   Comments:                  dressing clean, dry and intact, Each catheter limb disinfected per p&p, caps removed, hubs disinfected per p&p, aspirated 5 ml limb, flushed easily LINES REVERSED FOR OPTIMAL  FLOW                      Labs   HBsAg (Antigen) / date:              02/18/23 Negative                                 HBsAb (Antibody) / date: 02/18/23 susceptible   Source: epic     Safety:   Time Out Done:   (Time) 0900   Consent obtained/signed: verified   Education: Cvc infection control   Primary Nurse Rpt Pre: Syed Baldwin RN    Primary Nurse Rpt Post: Naina Akbar RN      Comments / Plan:      Code status, labs, consents and orders reviewed. Called to restart CVVHD after CT Scan. New [id number] filter primed and tested with one liter normal saline, CVVHD initiated as ordered. Dr. Karley Stearns at bedside dose decreased to 1500 as ordered. Lines visible, thermax temp 37*C, and effluent cartridge dated 03/02/23. Pre/post with primary nurse.

## 2023-03-02 NOTE — PROGRESS NOTES
[facility]    ICU TEAM Progress Note    Name: Tyron Red   : 1950   MRN: [id number]   Date: 3/2/2023      Assessment and Plan:     Briefly, Pt is 71 y.o w HFrEF who underwent LVAD placement on 2/15. Post op course complicated by TANNER requiring CRRRT as well as RV failure requiring Impella RP. Impella removed on 3/1. Overnight 3/1-3/1 CVA noted with SAH.  3/2 SAH enlarged. Held anticoagulation. ICU Problems:    Acute on chronic HFrEF (20%) NYHA IIIb/IV (D) on home inotropic support, life vest  TANNER on CKD3  CHB post op  Aflutter w/ RVR  Acute on chronic hypoxemic respiratory failure  CAP  CAD  Gastric neuroendocrine tumor  Hx of LUE DVT  DM  PAD  TRISTAN  BPH w/ urinary retention requiring chronic donnelly      ICU Checklist       F - Feeding:  Yes TF  A - Analgesia: None  S - Sedation: Propofol and Precedex  T - DVT Prophylaxis: Heparin Holding  H - Head of Bed: > 30 Degrees  U - Ulcer Prophylaxis: Protonix (pantoprazole)   G - Glycemic Control: Insulin  S - Spontaneous Breathing Trial: Pending FAILED  B - Bowel Regimen: MiraLax  I - Indwelling Catheter:   Tubes: ETT  Lines: Peripheral IV, Arterial Line, and Central Line  Drains: None      N- Sedated. Track exam.  Jeanna Rider consult. Resp - on vent. Failed extubation today  CV - LVAD. Impella RP removed today. Ongoing inotropic support  GI: TF  Renal: Renal failure on CRRT  Heme-  holding anticoagulation  ID - Septic shock.   On Fluc, flagyl, zosyn, and vanco      POD:  10 Days Post-Op    S/P:   Procedure(s):  LEFT GROIN RP IMPELLA INSERTION, KIARRA BY DR Rosenda Osuna    Active Problem List:     Problem List  Date Reviewed: 2023            Codes Class    Dyslipidemia, goal LDL below 70 ICD-10-CM: E78.5  ICD-9-CM: 272.4         PAD (peripheral artery disease) ([facility] [address] ICD-10-CM: I73.9  ICD-9-CM: 423.2         Systolic CHF, acute on chronic (HCC) ICD-10-CM: I50.23  ICD-9-CM: 428.23, 428.0         Receiving inotropic medication ICD-10-CM: S09.727  ICD-9-CM: V49.89         CHF (congestive heart failure) (HCC) ICD-10-CM: I50.9  ICD-9-CM: 428.0         CKD stage 3 due to type 2 diabetes mellitus (HCC) ICD-10-CM: E11.22, N18.30  ICD-9-CM: 250.40, 585.3         S/P CABG x 3 ICD-10-CM: Z95.1  ICD-9-CM: V45.81     Overview Signed 7/9/2018 11:20 AM by SCAR Colon     Coronary Artery Bypass Grafting x 3, LIMA to LAD, RSVG to OM, RSVG to RCA  Right GSV EVH             Coronary artery disease involving native coronary artery of native heart without angina pectoris ICD-10-CM: I25.10  ICD-9-CM: 414.01         Hypertension, essential ICD-10-CM: I10  ICD-9-CM: 401.9         Colon cancer screening ICD-10-CM: Z12.11  ICD-9-CM: V76.51         Nonrheumatic aortic valve stenosis ICD-10-CM: I35.0  ICD-9-CM: 424.1         Bruit of right carotid artery ICD-10-CM: R09.89  ICD-9-CM: 785. 9         Type 2 diabetes mellitus with hyperglycemia (HCC) ICD-10-CM: E11.65  ICD-9-CM: 250.00         Deafness ICD-10-CM: H91.90  ICD-9-CM: 389.9         Cramp of limb ICD-10-CM: R25.2  ICD-9-CM: 729.82          Past Medical History:      has a past medical history of CAD (coronary artery disease) (11/10/2016), Deafness (10/28/2012), DM (diabetes mellitus) ([facility] [address] Elevated cholesterol, Hypertension, and NSTEMI (non-ST elevated myocardial infarction) ([facility] [address] (11/10/2016). Past Surgical History:      has a past surgical history that includes hx appendectomy; pr unlisted procedure cardiac surgery (11/11/2016); colonoscopy (N/A, 6/28/2018); and colonoscopy (N/A, 1/18/2023). Home Medications:     Prior to Admission medications    Medication Sig Start Date End Date Taking? Authorizing Provider   polyethylene glycol (Miralax) 17 gram/dose powder Take 17 g by mouth daily as needed for Constipation. Yes Provider, Historical   furosemide (LASIX) 20 mg tablet Take 1 Tablet by mouth daily as needed (for weight greater than 120 lb by 2-3 lb or shortness of breath).  1/23/23  Yes Meryle Quay B, NP   glipiZIDE (GLUCOTROL) 5 mg tablet Take 1 tablet by mouth twice daily 12/2/22  Yes Amando Ross MD   ipratropium (ATROVENT) 21 mcg (0.03 %) nasal spray 2 Sprays by Both Nostrils route every twelve (12) hours. 11/21/22  Yes Amando Ross MD   ergocalciferol (ERGOCALCIFEROL) 1,250 mcg (50,000 unit) capsule Take 1 Capsule by mouth every seven (7) days. Patient taking differently: Take 50,000 Units by mouth every seven (7) days. Mondays 10/14/22  Yes Amando Ross MD   Januvia 50 mg tablet Take 1 tablet by mouth once daily 9/23/22  Yes Amando Ross MD   rosuvastatin (CRESTOR) 20 mg tablet Take 1 Tablet by mouth nightly. 2/28/22  Yes Olu Parker MD   aspirin delayed-release 81 mg tablet Take 81 mg by mouth daily. Yes Provider, Historical   zolpidem (AMBIEN) 5 mg tablet Take 1 Tablet by mouth nightly as needed for Sleep. Max Daily Amount: 5 mg. 1/24/23   Amando Ross MD   nitroglycerin (NITROSTAT) 0.4 mg SL tablet 1 Tablet by SubLINGual route every five (5) minutes as needed for Chest Pain. Up to 3 doses. 11/16/22   Katelynn Callaway MD       Allergies/Social/Family History: Allergies   Allergen Reactions    Ambien [Zolpidem] Other (comments)     Causes extreme confusion      Social History     Tobacco Use    Smoking status: Never     Passive exposure: Never    Smokeless tobacco: Never   Substance Use Topics    Alcohol use:  Yes     Alcohol/week: 2.0 standard drinks     Types: 1 Cans of beer, 1 Shots of liquor per week     Comment: rarely      Family History   Problem Relation Age of Onset    Heart Disease Father     Heart Attack Father     Hypertension Mother     Elevated Lipids Brother     Elevated Lipids Brother     No Known Problems Sister     Elevated Lipids Brother     No Known Problems Son     No Known Problems Daughter     Anesth Problems Neg Hx          Objective:   Vital Signs:  Visit Vitals  BP (!) 90/59   Pulse 100   Temp 98.4 °F (36.9 °C)   Resp 20   Ht 5' 6\" (1.676 m)   Wt 62.8 kg (138 lb 7.2 oz)   SpO2 98%   BMI 22.35 kg/m²    O2 Flow Rate (L/min): 20 l/min O2 Device: Endotracheal tube, Ventilator Temp (24hrs), Av.1 °F (37.3 °C), Min:97.9 °F (36.6 °C), Max:100.4 °F (38 °C)    CVP (mmHg): 9 mmHg (23 1300)      Intake/Output:     Intake/Output Summary (Last 24 hours) at 3/2/2023 1407  Last data filed at 3/2/2023 1300  Gross per 24 hour   Intake 3982.13 ml   Output 3237.4 ml   Net 744.73 ml         Physical Exam:    GEN:  ill appearing  Neuro:  Sedated  CV Reg  Resp Course  Abd soft   Extremities minimal edema    LABS AND  DATA: Personally reviewed  Recent Labs     23  03423  0437   WBC 6.7 8.4   HGB 8.3* 8.9*   HCT 26.5* 27.3*    201       Recent Labs     23  0346 23  0437   * 137   K 4.4 3.2*    104   CO2 22 24   BUN 25* 19   CREA 1.62* 1.59*   * 133*   CA 10.8* 10.8*   MG 2.7* 2.5*   PHOS 3.9 2.2*       Recent Labs     23  0346 23  0437    122*   TP 6.3* 6.0*   ALB 4.0 3.7   GLOB 2.3 2.3       Recent Labs     23  0719 23  0346 23  0004 23  0859 23  0437   INR  --  1.7*  --   --  1.5*   PTP  --  17.1*  --   --  15.1*   APTT 75.1*  --  55.1*   < >  --     < > = values in this interval not displayed. Recent Labs     23  1158 23  0554   PHI 7.41 7.40   PCO2I 33.3* 37.1   PO2I 62* 108*       No results for input(s): CPK, CKMB, TROIQ, BNPP in the last 72 hours.     Hemodynamics:   PAP: PAP Systolic: 38 ( 5615) CO: CO (l/min): 2.4 l/min (23 0700)   Wedge: PAOP (PCWP) (mmhg): 36 mmHg (23 1830) CI: CI (l/min/m2): 1.5 l/min/m2 (23 0700)   CVP:  CVP (mmHg): 9 mmHg (23 1300) SVR:       PVR:       Ventilator Settings:  Mode Rate Tidal Volume Pressure FiO2 PEEP   Assist control, Volume control   350 ml  8 cm H2O 50 % 5 cm H20     Peak airway pressure: 25 cm H2O    Minute ventilation: 6.8 l/min        MEDS: Reviewed    Chest X-Ray:  CXR Results  (Last 48 hours)                 03/02/23 0424  XR CHEST PORT Final result    Impression:      Probable increased pulmonary edema. Increased pleural effusions and bibasilar   airspace opacities. Narrative:  EXAM:  XR CHEST PORT       INDICATION: Status post LVAD       COMPARISON: 3/1/2023       TECHNIQUE: Semiupright portable chest AP view       FINDINGS: Large bore enteric feeding tube is in place. Impella device is   removed. Other tubes and lines are stable. The cardiac silhouette is within   normal limits. Decreased lung volumes. Probable increased pulmonary edema. Increased bilateral pleural effusions and   bibasilar volume loss. The visualized bones and upper abdomen are   age-appropriate. 03/01/23 0459  XR CHEST PORT Final result    Impression:      1. ET tube is in satisfactory position. 2. Mild interstitial edema, unchanged. Narrative:  EXAM: XR CHEST PORT       DATE: 3/1/2023 4:59 AM       INDICATION: s/p VAD intubated, impella       COMPARISON: Chest radiograph February 28, 2023       FINDINGS: AP portable chest radiograph. ET tube is in satisfactory position. NG   tube courses to the abdomen and beyond the field-of-view. Patient is status post   sternotomy. There is an LVAD in place. Impella device is stable in position. Subxiphoid drains are noted. LEFT and RIGHT IJ catheters are grossly stable. The   heart size is normal. Vascular clarity is mildly diminished. No definite   effusion or pneumothorax is seen. SPECIAL EQUIPMENT  None    DISPOSITION  Stay in ICU    CRITICAL CARE CONSULTANT NOTE  I had a face to face encounter with the patient, reviewed and interpreted patient data including clinical events, labs, images, vital signs, I/O's, and examined patient.   I have discussed the case and the plan and management of the patient's care with the consulting services, the bedside nurses and the respiratory therapist.      NOTE OF PERSONAL INVOLVEMENT IN CARE   This patient has a high probability of imminent, clinically significant deterioration, which requires the highest level of preparedness to intervene urgently. I participated in the decision-making and personally managed or directed the management of the following life and organ supporting interventions that required my frequent assessment to treat or prevent imminent deterioration. I personally spent 60 minutes of critical care time. This is time spent at this critically ill patient's bedside actively involved in patient care as well as the coordination of care and discussions with the patient's family. This does not include any procedural time which has been billed separately.       [address] [facility]

## 2023-03-02 NOTE — PROGRESS NOTES
ADVANCED HEART FAILURE NOTE    In light of subacute embolic CVA, plan to minimize anticoagulation. Continue heparin gtt; but lower aPTT ranges to 40-60  Discontinue coumadin  Aspirin remains on hold    Change in anticoagulation plans d/w Dr. Bebo Vick, Dr. Renee Sanchez and bedside staff.     Leonel Lane MD  [facility]

## 2023-03-03 NOTE — PROGRESS NOTES
0745: Bedside and Verbal shift change report received from [address] [place] [address] to [place]. Report included the following information SBAR, Kardex, Intake/Output, MAR, Recent Results, Med Rec Status, Cardiac Rhythm NSR, and Alarm Parameters . 0800: Pt alert in bed, nods head appropriately to questions, tracks with eyes, R pupil > L pupil, weak spontaneous movement and command following to BUE, withdraws to pain BLE    0830: , PI 11, TPR 1598 -Low flow 2.4   BP trending down up on its own as pt drifts back to sleep and sedation is resumed. 1000: MAP 54-57, Factor decreased from 25 cc to 0. MAP's slowly improving 64-70.    1030: Rizwan NP at bedside, update provided on overnight and AM events, plan to continue pulling net even. 1120: Precedex weaned in preparation for SBT. 1300: RT at bedside. SBT attempted, TV  150-320. Returned to previous vent settings  ACVC 20/350/50%/5    1500: Driveline site dressing changed - LVAD coordinator at bedside Jojo/Sonya to assess skin surrounding driveline. Plan to apply skin  prep to excoriated areas prep prior to application of new dressing. 1530Vicanastacio Olivera NP at bedside, okay to liberalize MAP goal 65-80. Updated additionally on inability to pull factor d/t labile BP, CVP 8-9. Orders received to continue factor 0    1812: PI 11.1, Flow 2.4, SBP 160s, Cardene started. 1900: No changes present to [facility] neuro assessments since 8 am.     1945: Bedside and Verbal shift change report given to [address] [place] [address] (oncoming nurse) by [place] (offgoing nurse). Report included the following information SBAR, Kardex, Intake/Output, MAR, Recent Results, Med Rec Status, Cardiac Rhythm NSR, 1st degree AVB, and Alarm Parameters .

## 2023-03-03 NOTE — DIALYSIS
CRRT / [phone]    Orders   Mode: CVVHD rounding   Blood Flow Rate: 200 ml/min   Prismasol Dose: 1500 ml/hr per    Prismasol Concentrate: 4K / 2.5Ca   Blood Warmer Temp: 37*C   Net Fluid Removal: Currently 25 ml/hr     Metrics   BP: 76/56 (map 66)   HR: 85   Access Pressure: -35   Filter Pressure: 139   Return Pressure: 55   TMP: 25   Pressure Drop: 43     Access   Type & Location: LIJ non-tunneled CVC C/D/I with transparent dressing and biopatch in place. Labs   HBsAg (Antigen) / date: Negative  02/18/23                                              HBsAb (Antibody) / date: Susceptible  02/18/23   Source: [employer]     Safety:   Time Out Done:   (Time) 0215   Consent obtained/signed: Verified   Education: Access/Site Care   Primary Nurse Rpt Pre: LORI Monique   Primary Nurse Rpt Post: LORI Monique     Comments / Plan:   Patient, orders, line and consent verified. Labs, notes and code status reviewed. [id number] filter running well with no indications for change at this time. Lines visible/secure with blood warmer attached to the return line and set to 37C*. Education and Pre/Post with primary RN. Lines currently running reversed.

## 2023-03-03 NOTE — PROGRESS NOTES
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 Aissatou Perez  C/b RV failure requiring Impella RP placed 23 and discontinued 3/1/23  C/b acute on chronic renal failure, requiring CRRT  C/b hepatic failure, TB 15.1 (peak 15.3)  C/b lactic acidosis, resolved  C/b persistent septic shock c/b vasodilation and high outputs, on multiple antibiotics, procalcitonin improving, still elevated 2.33  C/b R SUPA occlusion with small area of matched perfusion defect - subacute infarct  Patient was approved for LVAD implantation as destination therapy at [facility] 2/3/23; the following have been identified and d/w patient as relative concerns pertaining to LVAD candidacy: small body surface area, cachexia, BMI 18, malnutrition, frailty, advanced age, muscular deconditioning, PVD (fingertips), urinary retention requiring donnelly catheter, neuroendocrine tumor class 1 requiring treatment after LVAD, mild neurocognitive dysfunction, chronic kidney disease and hearing loss requiring hearing aid  Remains critically ill in CVICU. Failed extubation yesterday 3/2. Off blood thinners for SAH        RECOMMENDATIONS:  Continue LVAD at 4500rpm  Epi decreased to 2mcg/min yesterday; CI borderline by NICOMs.   May need to increase back to 3  Cardene gtt for goal SBP < 110mmHg and/or MAP < 90mmHg  Continuous NICOMs; change patches every other day  No beta-blockers due to hypotension and RV conditioning prior to LVAD  Cannot tolerate ARB/ARNi due to hypotension   Cannot tolerate spironolactone due to hyperkalemia  Cannot tolerate jardiance due to significant diuresis on smallest dose/contributed to IVVD  Previously discontinued corlanor due to SVT  Digoxin stopped d/t risk for toxicity with amio loading  Discontinued amio due to intermitted heart blocks under pacemaker  Nephrology consult appreciated, cont CRRT, goal CVP 10-12  Keep K+ >4 and Mg >2  Transfuse one unit pRBC for hgb > 7 prn  Continue antibiotics, zosyn, vanc, flagyl and diflucan  Continue colchicine 0.6mg daily for possible pericarditis  Coumadin and heparin on hold per neurosurgery. High risk to hold and high risk to give   Cannot tolerate statins due to abnormal LFT  Timing of chest tube removal per Dr. Cris Sosa TF   Continue bowel regimen   Daily dressing changes to Drive line exit site  Pulmonary hygiene   Timing of extubation per intensivist- SBT today  Neurology consultation re: further recommendations and plans for surveillance  VAD education with caregivers/patient when able by VAD coordinator        INTERVAL HISTORY:  Left sided weakness noted night 3/1.   +SAH and subacute occlusion distal R cerebral artery   Was extubated 3/2 and then reintubated 1 hour later   RVAD removed 3/1/23  MAPs 60s-80s; several low flows when BP high   Tmax 99.8  CVP 11  Epi 2, levo off, propofol gtt  I/O even; weight roughly the same  INR 1.6  Cr 1.67 on CRRT  TB 12.7 from 15, peak 15.3  Lactic 2.5 from 2.0  Procalc 4.36 from 2.33 from 2.61 from 2.66 from 3.14 from 3.55 from 4.24  -unable to perform ROS due to sedation            IMPRESSION:  Acute on chronic combined systolic/diastolic  heart failure  Stage D, NYHA class IV symptoms   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, LVEF 20% (by echo 1/2023) and 23% (by cMRI 1/3/23)  Cardiac MRI suggestive of ischemic cardiomyopathy  PYP equivocal  S/p HeartMate 3  LVAD-DT (2/15/23)  C/b RV failure requiring Impella RP (2/18/23)  C/b franco on CKD requiring CRRT  C/b liver dysfunction (ischemic vs congestive)  Coronary artery disease  CAD s/p CABG x 2: further disease best managed medically due to small vessel size   At risk of sudden cardiac death  Peripheral arterial disease  Bilateral hydronephrosis s/p andrzej  Cardiac risk factors:  HTN  HL  TRISTAN, STOP-BANG 4  DM2  CKD, stage 3  MOCA from 2/9 19/30, consistent with mild cognitive impairment  Hard of hearing  Gastric Neuroendocrine Tumor, elevated gastrin and chromogranin A  Septic shock, improving   Left sided weakness noted night 3/1. +SAH and subacute occlusion distal R cerebral artery             LIFE GOALS: not readdressed today   Patient's personal goals included: having a few more years with family  Important upcoming milestones or family events: None at this time   The patient identified the following as important for living well: being at home, not being SOB            CARDIAC IMAGING:     ECHO (3/2/23) EF 20-25%; LV mod dilated; severe global hypokinesis; RV mod dilated; mildly reduced systolic function;      Echo 2/19/23    Left Ventricle: Severely reduced left ventricular systolic function with a visually estimated EF of 20 - 25%. Not well visualized. Left ventricle size is normal. Increased wall thickness. Unable to assess wall motion. Abnormal diastolic function. LVAD is present. LVAD inflow cannula was not well visualized. Right Ventricle: Not well visualized. Right ventricle is mildly dilated. Moderately reduced systolic function. TAPSE is abnormal.    Mitral Valve: Severe annular calcification at the anterior and posterior leaflets of the mitral valve. Mild regurgitation. Left Atrium: Left atrium is dilated. Right Atrium: Right atrium is dilated. Technical qualifiers: Echo study was technically difficult and technically difficult with poor endocardial visualization. Echo 1/27/23    Left Ventricle: EF by visual approximation is 20%. Left ventricle is mildly dilated. Mitral Valve: Moderately thickened leaflet, at the anterior and posterior leaflets.  Moderately calcified leaflet. Moderate regurgitation. Left Atrium: Left atrium is moderately dilated. Technical qualifiers: Echo study was technically difficult with poor endocardial visualization. Contrast used: Definity. Limited study, not all structures viewed     Echo 1/9/23   Left Ventricle: Severely reduced left ventricular systolic function with a visually estimated EF of 25 - 30%. Left ventricle size is normal. Mildly increased wall thickness. Echo 12/26/22    Left Ventricle: Severely reduced left ventricular systolic function with a visually estimated EF of 25 - 30%. Left ventricle size is normal. Normal wall thickness. There are regional wall motion abnormalities. Grade II diastolic dysfunction with increased LAP. Right Ventricle: Moderately reduced systolic function. TAPSE is abnormal. TAPSE is 1.1 cm. Aortic Valve: Mild stenosis of the aortic valve. AV peak gradient is 13 mmHg. AV peak velocity is 1.8 m/s. Mitral Valve: Not well visualized. Moderate annular calcification at the posterior leaflet of the mitral valve. Mild to moderate regurgitation. Tricuspid Valve: Mildly elevated RVSP. Left Atrium: Left atrium is moderately dilated. 12/8/22    Left Ventricle: Moderately reduced left ventricular systolic function with a visually estimated EF of 35 - 40%. Severe hypokinesis of the following segments: mid anteroseptal, apical anterior, apical septal, apical inferior and apical lateral. Severe hypokinesis of the apex. Mitral Valve: Severely thickened leaflet, at the anterior and posterior leaflets. Severely calcified leaflet, at the anterior and posterior leaflets. Mild annular calcification of the mitral valve. Moderate regurgitation. Left Atrium: Left atrium is mildly dilated. Contrast used: Definity. limited study     EKG 12/22/22 ST, Biatria enlargement, marked ST abnormality     C 12/6/22  1. Normal LVEDP  2. Severe native multivessel coronary artery disease  3.   Patent LIMA to LAD and vein graft to distal RCA  4. Recurrent ISR in OM1 stent with now 60 to 70% restenosis  5. Recoil of left main and circumflex stent with now recurrent 40 to 50% stenosis. 6.  Progression of ostial left main disease now to about 60% stenosis  7. Progression of disease in jailed first marginal branch now with diffuse 90% stenosis  8.   High-grade stenosis in the mid to distal right potential femoral artery treated with 6 x 40 mm impact drug-coated balloon angioplasty to reduce the stenosis to less than 40%        HEMODYNAMICS:  RHC 1/9/23  PA 20/9, RA 3, PCWP 8, CI 1.8     CPEST too ill   6MW 300 feet    LVAD INTERROGATION:  Device interrogated in person  Device function normal, normal flow, no events  LVAD   Pump Speed (RPM): 4600  Pump Flow (LPM): 3  MAP: 68  PI (Pulsitility Index): 7.4  Power: 2.9   Test: No  Back Up  at Bedside & Labeled: Yes  Power Module Test: No  Driveline Site Care: No  Driveline Dressing: Clean, Dry, and Intact  Testing  Alarms Reviewed: Yes  Back up SC speed: 4600  Back up Low Speed Limit: 4200  Emergency Equipment with Patient?: Yes  Emergency procedures reviewed?: Yes  Drive line site inspected?: No  Drive line intergrity inspected?: Yes  Drive line dressing changed?: No    PHYSICAL EXAM:  Visit Vitals  BP (!) 76/56   Pulse 85   Temp 98.9 °F (37.2 °C)   Resp 22   Ht 5' 6\" (1.676 m)   Wt 138 lb 7.2 oz (62.8 kg)   SpO2 100%   BMI 22.35 kg/m²     Physical Exam  Physical Assessment:   General Appearance: ill appearing, cachectic elderly male resting in bed  Eyes: sclera icteric  Mouth/Throat: dry mucous membranes; intubated  Neck: supple;   Pulmonary:  intubated; clear to auscultation bilaterally; vent   Cardiovascular: regular rate and rhythm; VAD hum  Abdomen: soft, non-tender, non-distended; bowel sounds normal  Musculoskeletal: no swelling or deformity;sedated  Extremities: no edema; weak distal pulses   Skin: warm and dry; jaundiced; LE slightly cool   Neuro: intubated and sedated  Psych: unable to assess          REVIEW OF SYSTEMS:  Review of Systems   Unable to perform ROS: Intubated    PAST MEDICAL HISTORY:  Past Medical History:   Diagnosis Date    CAD (coronary artery disease) 11/10/2016    NSTEMI & 2 stents    Deafness 10/28/2012    DM (diabetes mellitus) ([facility] [address]     Elevated cholesterol     Hypertension     NSTEMI (non-ST elevated myocardial infarction) ([facility] [address] 11/10/2016       PAST SURGICAL HISTORY:  Past Surgical History:   Procedure Laterality Date    COLONOSCOPY N/A 6/28/2018    COLONOSCOPY performed by Radha Smalls MD at [facility] ENDOSCOPY    COLONOSCOPY N/A 1/18/2023    COLONOSCOPY performed by Marlen Garcia MD at [facility] ENDOSCOPY    [address]  11/11/2016    2 stents       FAMILY HISTORY:  Family History   Problem Relation Age of Onset    Heart Disease Father     Heart Attack Father     Hypertension Mother     Elevated Lipids Brother     Elevated Lipids Brother     No Known Problems Sister     Elevated Lipids Brother     No Known Problems Son     No Known Problems Daughter     Anesth Problems Neg Hx        SOCIAL HISTORY:  Social History     Socioeconomic History    Marital status:    Tobacco Use    Smoking status: Never     Passive exposure: Never    Smokeless tobacco: Never   Vaping Use    Vaping Use: Never used   Substance and Sexual Activity    Alcohol use:  Yes     Alcohol/week: 2.0 standard drinks     Types: 1 Cans of beer, 1 Shots of liquor per week     Comment: rarely    Drug use: No    Sexual activity: Yes     Social Determinants of Health     Financial Resource Strain: Medium Risk    Difficulty of Paying Living Expenses: Somewhat hard   Food Insecurity: Food Insecurity Present    Worried About Running Out of Food in the Last Year: Never true    Ran Out of Food in the Last Year: Often true       LABORATORY RESULTS:     Labs Latest Ref Rng & Units 3/3/2023 3/2/2023 3/1/2023 2/28/2023 2/28/2023 2/27/2023 2/27/2023   WBC 4.1 - 11.1 K/uL 8.0 6.7 8.4 - 8.8 - 8.9   RBC 4.10 - 5.70 M/uL 2.66(L) 2.69(L) 2.92(L) - 2.31(L) - 2.52(L)   Hemoglobin 12.1 - 17.0 g/dL 8. 2(L) 8. 3(L) 8. 9(L) 8.7(L) 7. 0(L) - 7. 3(L)   Hematocrit 36.6 - 50.3 % 27. 6(L) 26. 5(L) 27. 3(L) 27. 2(L) 22. 8(L) - 24. 2(L)   MCV 80.0 - 99.0 . 8(H) 98.5 93.5 - 98.7 - 96.0   Platelets 853 - 392 K/uL 230 190 201 - 171 - 166   Lymphocytes 12 - 49 % 9(L) 6(L) 12 - 8(L) - -   Monocytes 5 - 13 % 10 5 11 - 10 - -   Eosinophils 0 - 7 % 5 5 6 - 4 - -   Basophils 0 - 1 % 1 1 1 - 1 - -   Albumin 3.5 - 5.0 g/dL 3.9 4.0 3.7 - 3.6 - -   Calcium 8.5 - 10.1 MG/DL 11. 5(H) 10. 8(H) 10. 8(H) 10. 3(H) 9.7 9.3 -   Glucose 65 - 100 mg/dL 206(H) 189(H) 133(H) 122(H) 174(H) 105(H) -   BUN 6 - 20 MG/DL 26(H) 25(H) 19 21(H) 25(H) 23(H) -   Creatinine 0.70 - 1.30 MG/DL 1.67(H) 1.62(H) 1.59(H) 1.61(H) 1.64(H) 1.51(H) -   Sodium 136 - 145 mmol/L 137 135(L) 137 138 136 138 -   Potassium 3.5 - 5.1 mmol/L 4.3 4.4 3.2(L) 3.6 4.0 4.0 -   TSH 0.36 - 3.74 uIU/mL - - - - - - -   PSA 0.01 - 4.0 ng/mL - - - - - - -   LDH 85 - 241 U/L 355(H) 419(H) 612(H) - 582(H) - -   Some recent data might be hidden     Lab Results   Component Value Date/Time    TSH 3.52 01/28/2023 05:26 AM    TSH 2.12 12/27/2022 02:36 PM    TSH 4.80 (H) 12/06/2022 03:53 AM    TSH 5.39 (H) 10/12/2022 09:10 AM    TSH 3.53 02/03/2022 11:47 AM    TSH 5.790 (H) 11/21/2019 04:45 PM    TSH 3.08 06/22/2018 01:53 PM    TSH 4.250 05/26/2015 09:43 AM       ALLERGY:  Allergies   Allergen Reactions    Ambien [Zolpidem] Other (comments)     Causes extreme confusion        CURRENT MEDICATIONS:    Current Facility-Administered Medications:     heparin 25,000 units in D5W 250 ml infusion, 12-25 Units/kg/hr, IntraVENous, TITRATE, Pablo Hunter MD, Stopped at 03/02/23 1400    aspirin chewable tablet 81 mg, 81 mg, Per NG tube, DAILY, Adina Curry MD, 81 mg at 03/03/23 0856    levETIRAcetam (KEPPRA) injection 500 mg, 500 mg, IntraVENous, Q12H, Lobdell, Yancy, ARNP, 500 mg at 03/03/23 0532    bicarbonate dialysis (PRISMASOL) BG K 4/Ca 2.5 5000 ml solution, , Extracorporeal, DIALYSIS CONTINUOUS, Miladys Schultz MD, Last Rate: 1,750 mL/hr at 03/03/23 1138, New Bag at 03/03/23 1138    insulin NPH (NOVOLIN N, HUMULIN N) injection 16 Units, 16 Units, SubCUTAneous, Q12H, Cathy Sheldon, SLICK, 16 Units at 03/03/23 0856    insulin lispro (HUMALOG) injection, , SubCUTAneous, Q4H, Cathy Sheldon, CNS, 2 Units at 03/03/23 1150    epoetin heidy-epbx (RETACRIT) injection 10,000 Units, 10,000 Units, SubCUTAneous, Q TUE, THU & SAT, Miladys Schultz MD, 10,000 Units at 03/02/23 2053    EPINEPHrine (ADRENALIN) 10 mg in 0.9% sodium chloride 250 mL infusion, 0-10 mcg/min, IntraVENous, TITRATE, Sandra Johnson MD, Last Rate: 3 mL/hr at 03/03/23 0806, 2 mcg/min at 03/03/23 6875    amiodarone (CORDARONE) 900 mg/250 ml D5W infusion, 0.5-1 mg/min, IntraVENous, TITRATE, Sandra Johnson MD    vasopressin (VASOSTRICT) 20 Units in 0.9% sodium chloride 100 mL infusion, 0-0.04 Units/min, IntraVENous, TITRATE, Sandra Johnson MD    PHENYLephrine (JONELLE-SYNEPHRINE) 100 mg in 0.9% sodium chloride 250 mL infusion,  mcg/min, IntraVENous, TITRATE, Sandra Johnson MD    niCARdipine (CARDENE) 50 mg in 0.9% sodium chloride 100 mL infusion, 0-15 mg/hr, IntraVENous, TITRATE, Christine Cammie, DO, Last Rate: 15 mL/hr at 03/02/23 1852, 7.5 mg/hr at 03/02/23 1852    propofol (DIPRIVAN) 10 mg/mL infusion, 0-50 mcg/kg/min, IntraVENous, TITRATE, Aurelio Samaniego MD, Stopped at 03/02/23 1628    HYDROmorphone 30 mg/30 mL (1 mg/mL) infusion, 0.5-1 mg/hr, IntraVENous, CONTINUOUS, Fiser, Sandra Piña MD, Last Rate: 0.5 mL/hr at 03/03/23 0806, 0.5 mg/hr at 03/03/23 0806    dextrose (D50W) injection syrg 25 g, 25 g, IntraVENous, PRN, Yanira Michael MD, 9 mL at 02/25/23 1225    neomycin-polymyxin-dexamethasone (DEXACINE) 3.5 mg/g-10,000 unit/g-0.1 % ophthalmic ointment, , Both Eyes, BID, Berny Guy MD, Given at 03/03/23 0857    NOREPINephrine (LEVOPHED) 8 mg in 5% dextrose 250mL (32 mcg/mL) infusion, 0.5-16 mcg/min, IntraVENous, TITRATE, Reynaldo Johnson MD, Stopped at 03/02/23 1430    colchicine tablet 0.6 mg, 0.6 mg, Oral, DAILY, Lizette Linton, NP, 0.6 mg at 03/03/23 0856    fluconazole (DIFLUCAN) 400mg/200 mL IVPB (premix), 400 mg, IntraVENous, Q24H, Shaila, Lizette B, NP, Last Rate: 100 mL/hr at 03/03/23 1048, 400 mg at 03/03/23 1048    vancomycin (VANCOCIN) 750 mg in 0.9% sodium chloride 250 mL ([id number]), 750 mg, IntraVENous, Q24H, Walker Aponte MD, Last Rate: 250 mL/hr at 03/02/23 1443, 750 mg at 03/02/23 1443    piperacillin-tazobactam (ZOSYN) 3.375 g in 0.9% sodium chloride (MBP/ADV) 100 mL MBP, 3.375 g, IntraVENous, Q8H, Walker Aponte MD, Last Rate: 25 mL/hr at 03/03/23 0532, 3.375 g at 03/03/23 0532    Vancomycin - pharmacy to dose, , Other, Rx Dosing/Monitoring, Walker Aponte MD    metroNIDAZOLE (FLAGYL) IVPB premix 500 mg, 500 mg, IntraVENous, Q12H, Kobe MOLINA MD, Last Rate: 100 mL/hr at 03/03/23 0858, 500 mg at 03/03/23 0858    heparin (porcine) 1,000 unit/mL injection 1,700 Units, 1,700 Units, InterCATHeter, DIALYSIS PRN, 1,700 Units at 03/02/23 0736 **AND** heparin (porcine) 1,000 unit/mL injection 1,500 Units, 1,500 Units, InterCATHeter, DIALYSIS PRN, Kacy Gutierrez DO, 1,500 Units at 03/02/23 0735    balsam peru-castor oiL (VENELEX) ointment, , Topical, BID, Blayne Armijo MD, Given at 03/03/23 0857    acetaminophen (TYLENOL) solution 650 mg, 650 mg, Oral, Q4H PRN, Kai Vazquez MD, 650 mg at 02/20/23 0401    acetaminophen (TYLENOL) suppository 650 mg, 650 mg, Rectal, Q4H PRN, Kai Vazquez MD, 650 mg at 02/17/23 2013    HYDROmorphone (DILAUDID) injection 0.5 mg, 0.5 mg, IntraVENous, Q3H PRN, Kobe MOLINA MD, 0.5 mg at 02/22/23 2225    HYDROmorphone (DILAUDID) injection 1 mg, 1 mg, IntraVENous, Q4H PRN, Noman Foley MD, 1 mg at 02/28/23 0557    [Held by provider] heparin 25,000 units in D5W 250 ml infusion, 12-25 Units/kg/hr, IntraVENous, TITRATE, Jack Del Toro MD, Last Rate: 8.8 mL/hr at 03/02/23 0826, 13 Units/kg/hr at 03/02/23 0826    albumin human 25% (BUMINATE) solution 12.5 g, 12.5 g, IntraVENous, Q6H, Jack Del Toro MD, Last Rate: 60 mL/hr at 02/27/23 0020, 12.5 g at 03/03/23 1116    bumetanide (BUMEX) injection 0.5 mg, 0.5 mg, IntraVENous, Q4H PRN, Jack Del Toro MD, 0.5 mg at 02/17/23 1431    glucose chewable tablet 16 g, 4 Tablet, Oral, PRN, Shaila, Lizette B, NP    glucagon (GLUCAGEN) injection 1 mg, 1 mg, IntraMUSCular, PRN, Shaila, Lizette B, NP    sodium chloride (NS) flush 5-40 mL, 5-40 mL, IntraVENous, Q8H, Shaila, Lizette B, NP, 10 mL at 03/03/23 0533    sodium chloride (NS) flush 5-40 mL, 5-40 mL, IntraVENous, PRN, Shaila, Lizette B, NP, 40 mL at 02/17/23 1818    naloxone (NARCAN) injection 0.4 mg, 0.4 mg, IntraVENous, PRN, Shaila, Lizette B, NP    ELECTROLYTE REPLACEMENT NOTE: Nurse to review Serum Potassium and Magnesuim levels and Initiate Electrolyte Replacement Protocol as needed, 1 Each, Other, PRN, Shaila, Lizette B, NP    dextrose 10 % infusion 0-250 mL, 0-250 mL, IntraVENous, PRN, Shaila, Lizette B, NP, Last Rate: 150 mL/hr at 02/24/23 0220, 75 mL at 02/24/23 0220    acetaminophen (TYLENOL) tablet 650 mg, 650 mg, Oral, Q6H PRN, Shaila, Lizette B, NP, 650 mg at 02/17/23 0200    ondansetron (ZOFRAN) injection 4 mg, 4 mg, IntraVENous, Q4H PRN, Shaila, Lizette B, NP    albuterol-ipratropium (DUO-NEB) 2.5 MG-0.5 MG/3 ML, 3 mL, Nebulization, Q6H PRN, Shaila, Lizette B, NP    senna-docusate (PERICOLACE) 8.6-50 mg per tablet 1 Tablet, 1 Tablet, Oral, DAILY, Shaila, Lizette B, NP, 1 Tablet at 03/03/23 0856    polyethylene glycol (MIRALAX) packet 17 g, 17 g, Oral, DAILY, Sahila, Lizette B, NP, 17 g at 03/03/23 0856    bisacodyL (DULCOLAX) suppository 10 mg, 10 mg, Rectal, DAILY PRN, Lizette Linton, NP, 10 mg at 02/22/23 0839    0.45% sodium chloride infusion, 10 mL/hr, IntraVENous, CONTINUOUS, Adriana Broussard MD, Last Rate: 10 mL/hr at 03/02/23 2005, 10 mL/hr at 03/02/23 2005    DOBUTamine (DOBUTREX) 500 mg/250 mL (2,000 mcg/mL) infusion, 0-10 mcg/kg/min, IntraVENous, TITRATE, Eleonora Johnson MD, Stopped at 02/23/23 1044    dexmedeTOMidine in 0.9 % NaCl (PRECEDEX) 400 mcg/100 mL (4 mcg/mL) infusion soln, 0.1-1.5 mcg/kg/hr, IntraVENous, TITRATE, Eleonora Johnson MD, Last Rate: 5.7 mL/hr at 03/03/23 1203, 0.4 mcg/kg/hr at 03/03/23 1203    0.9% sodium chloride infusion, 9 mL/hr, IntraVENous, CONTINUOUS, Mounika Rodríguez MD, Last Rate: 11 mL/hr at 03/03/23 0805, 11 mL/hr at 03/03/23 0805    PATIENT CARE TEAM:  Patient Care Team:  Francis Chen MD as PCP - General (Family Medicine)  Francis Chen MD as PCP - [facility]  Modesta Garcia MD (Cardiovascular Disease Physician)  Mary Beth Laurent MD (Gastroenterology)  Mounika Rodríguez MD (Cardiothoracic Surgery)  Shonna Crespo MD (Cardiovascular Disease Physician)  Yannick Beach MD (Nephrology)  Anjum Adam MD (Pulmonary Disease)  Adriana Broussard MD ([address] [place] [address] Vascular Surgery)     Thank you for allowing me to participate in this patient's care. Ching Amaya NP   [address] [place] [address], [address]  Phone: [phone]    On this date 3/3/2023, I have spent a total time of  45 minutes personally reviewing new vitals, test results, notes from recent visits, face to face encounter/physical exam of patient with counseling, writing orders, performing medical decision making, and documenting.

## 2023-03-03 NOTE — PROGRESS NOTES
Problem: Diabetes Self-Management  Goal: *Disease process and treatment process  Description: Define diabetes and identify own type of diabetes; list 3 options for treating diabetes. Outcome: Progressing Towards Goal  Goal: *Incorporating nutritional management into lifestyle  Description: Describe effect of type, amount and timing of food on blood glucose; list 3 methods for planning meals. Outcome: Progressing Towards Goal  Goal: *Incorporating physical activity into lifestyle  Description: State effect of exercise on blood glucose levels. Outcome: Progressing Towards Goal  Goal: *Developing strategies to promote health/change behavior  Description: Define the ABC's of diabetes; identify appropriate screenings, schedule and personal plan for screenings. Outcome: Progressing Towards Goal  Goal: *Using medications safely  Description: State effect of diabetes medications on diabetes; name diabetes medication taking, action and side effects. Outcome: Progressing Towards Goal  Goal: *Monitoring blood glucose, interpreting and using results  Description: Identify recommended blood glucose targets  and personal targets. Outcome: Progressing Towards Goal  Goal: *Prevention, detection, treatment of acute complications  Description: List symptoms of hyper- and hypoglycemia; describe how to treat low blood sugar and actions for lowering  high blood glucose level. Outcome: Progressing Towards Goal  Goal: *Prevention, detection and treatment of chronic complications  Description: Define the natural course of diabetes and describe the relationship of blood glucose levels to long term complications of diabetes.   Outcome: Progressing Towards Goal  Goal: *Developing strategies to address psychosocial issues  Description: Describe feelings about living with diabetes; identify support needed and support network  Outcome: Progressing Towards Goal  Goal: *Insulin pump training  Outcome: Progressing Towards Goal  Goal: *Sick day guidelines  Outcome: Progressing Towards Goal  Goal: *Patient Specific Goal (EDIT GOAL, INSERT TEXT)  Outcome: Progressing Towards Goal     Problem: Patient Education: Go to Patient Education Activity  Goal: Patient/Family Education  Outcome: Progressing Towards Goal     Problem: Pressure Injury - Risk of  Goal: *Prevention of pressure injury  Description: Document Mike Scale and appropriate interventions in the flowsheet. Outcome: Progressing Towards Goal  Note: Pressure Injury Interventions:  Sensory Interventions: Assess changes in LOC, Assess need for specialty bed, Check visual cues for pain, Float heels, Keep linens dry and wrinkle-free, Minimize linen layers, Monitor skin under medical devices, Pressure redistribution bed/mattress (bed type), Turn and reposition approx. every two hours (pillows and wedges if needed)    Moisture Interventions: Absorbent underpads, Assess need for specialty bed, Check for incontinence Q2 hours and as needed, Maintain skin hydration (lotion/cream), Minimize layers    Activity Interventions: Assess need for specialty bed, Pressure redistribution bed/mattress(bed type)    Mobility Interventions: Float heels, Pressure redistribution bed/mattress (bed type), Turn and reposition approx. every two hours(pillow and wedges)    Nutrition Interventions: Document food/fluid/supplement intake    Friction and Shear Interventions: Apply protective barrier, creams and emollients, Feet elevated on foot rest, Minimize layers                Problem: Patient Education: Go to Patient Education Activity  Goal: Patient/Family Education  Outcome: Progressing Towards Goal     Problem: Falls - Risk of  Goal: *Absence of Falls  Description: Document Laverne Fall Risk and appropriate interventions in the flowsheet.   Outcome: Progressing Towards Goal  Note: Fall Risk Interventions:  Mobility Interventions: PT Consult for mobility concerns, PT Consult for assist device competence         Medication Interventions: Evaluate medications/consider consulting pharmacy, Patient to call before getting OOB, Teach patient to arise slowly    Elimination Interventions: Call light in reach, Stay With Me (per policy)    History of Falls Interventions: Door open when patient unattended, Evaluate medications/consider consulting pharmacy         Problem: Patient Education: Go to Patient Education Activity  Goal: Patient/Family Education  Outcome: Progressing Towards Goal     Problem: Pain  Goal: *Control of Pain  Outcome: Progressing Towards Goal  Goal: *PALLIATIVE CARE:  Alleviation of Pain  Outcome: Progressing Towards Goal     Problem: Patient Education: Go to Patient Education Activity  Goal: Patient/Family Education  Outcome: Progressing Towards Goal     Problem: Patient Education: Go to Patient Education Activity  Goal: Patient/Family Education  Outcome: Progressing Towards Goal     Problem: Patient Education: Go to Patient Education Activity  Goal: Patient/Family Education  Outcome: Progressing Towards Goal     Problem: Nutrition Deficit  Goal: *Optimize nutritional status  Outcome: Progressing Towards Goal     Problem: Ventilator Management  Goal: *Adequate oxygenation and ventilation  Outcome: Progressing Towards Goal  Goal: *Patient maintains clear airway/free of aspiration  Outcome: Progressing Towards Goal  Goal: *Absence of infection signs and symptoms  Outcome: Progressing Towards Goal  Goal: *Normal spontaneous ventilation  Outcome: Progressing Towards Goal     Problem: Patient Education: Go to Patient Education Activity  Goal: Patient/Family Education  Outcome: Progressing Towards Goal     Problem: Patient Education: Go to Patient Education Activity  Goal: Patient/Family Education  Outcome: Progressing Towards Goal     Problem: LVAD Pre-op Period  Goal: Off Pathway (Use only if patient is Off Pathway)  Outcome: Progressing Towards Goal  Goal: Activity/Safety  Outcome: Progressing Towards Goal  Goal: Consults, if ordered  Outcome: Progressing Towards Goal  Goal: Diagnostic Test/Procedures  Outcome: Progressing Towards Goal  Goal: Nutrition/Diet  Outcome: Progressing Towards Goal  Goal: Medications  Outcome: Progressing Towards Goal  Goal: Treatments/Interventions/Procedures  Outcome: Progressing Towards Goal  Goal: Discharge Planning  Outcome: Progressing Towards Goal  Goal: Psychosocial  Outcome: Progressing Towards Goal  Goal: *Hemodynamically stable (eg: Cardiac output/index; pulmonary arterial pressures; mixed venous oxygen saturation within set parameters)  Outcome: Progressing Towards Goal  Goal: *Labs/tests completed  Outcome: Progressing Towards Goal     Problem: LVAD Pre-Op Day  Goal: Off Pathway (Use only if patient is Off Pathway)  Outcome: Progressing Towards Goal  Goal: Activity/Safety  Outcome: Progressing Towards Goal  Goal: Consults, if ordered  Outcome: Progressing Towards Goal  Goal: Diagnostic Test/Procedures  Outcome: Progressing Towards Goal  Goal: Nutrition/Diet  Outcome: Progressing Towards Goal  Goal: Medications  Outcome: Progressing Towards Goal  Goal: Treatments/Interventions/Procedures  Outcome: Progressing Towards Goal  Goal: Discharge Planning  Outcome: Progressing Towards Goal  Goal: Psychosocial  Outcome: Progressing Towards Goal  Goal: *Vital signs within specified parameters  Outcome: Progressing Towards Goal  Goal: *Consent obtained, permits and diagnostics complete  Outcome: Progressing Towards Goal  Goal: *Confirmation of post discharge primary support person(s)  Outcome: Progressing Towards Goal     Problem: LVAD Day of Surgery (Initiate SCIP measure for post-op care)  Goal: Off Pathway (Use only if patient is Off Pathway)  Outcome: Progressing Towards Goal  Goal: Activity/Safety  Outcome: Progressing Towards Goal  Goal: Consults, if ordered  Outcome: Progressing Towards Goal  Goal: Diagnostic Test/Procedures  Outcome: Progressing Towards Goal  Goal: Nutrition/Diet  Outcome: Progressing Towards Goal  Goal: Medications  Outcome: Progressing Towards Goal  Goal: Respiratory  Outcome: Progressing Towards Goal  Goal: Treatments/Interventions/Procedures  Outcome: Progressing Towards Goal  Goal: Psychosocial  Outcome: Progressing Towards Goal  Goal: *Hemodynamically stable (eg: Cardiac output/index; pulmonary arterial pressures; mixed venous oxygen saturation within set parameters)  Outcome: Progressing Towards Goal  Goal: *Lungs clear or at baseline  Outcome: Progressing Towards Goal  Goal: *Stable cardiac rhythm  Outcome: Progressing Towards Goal  Goal: *Follows simple commands post anesthesia  Outcome: Progressing Towards Goal  Goal: *Optimal pain control at patient's stated goal  Outcome: Progressing Towards Goal  Goal: *LVAD parameters within set limits  Outcome: Progressing Towards Goal     Problem: Nutrition Deficit  Goal: *Optimize nutritional status  Outcome: Progressing Towards Goal

## 2023-03-03 NOTE — PROGRESS NOTES
[facility]    ICU TEAM Progress Note    Name: Bayron Carvajal   : 1950   MRN: [id number]   Date: 3/3/2023      Assessment and Plan:     Briefly, Pt is 71 y.o w HFrEF who underwent LVAD placement on 2/15. Post op course complicated by TANNER requiring CRRRT as well as RV failure requiring Impella RP. Impella removed on 3/1. Overnight 3/1-3/1 CVA noted with SAH.  3/2 SAH enlarged. Held anticoagulation. ICU Problems:    Acute on chronic HFrEF (20%) NYHA IIIb/IV (D) on home inotropic support, life vest  TANNER on CKD3  CHB post op  Aflutter w/ RVR  Acute on chronic hypoxemic respiratory failure  CAP  CAD  Gastric neuroendocrine tumor  Hx of LUE DVT  DM  PAD  TRISTAN  BPH w/ urinary retention requiring chronic donnelly      ICU Checklist       F - Feeding:  Yes TF  A - Analgesia: None  S - Sedation: Propofol and Precedex  T - DVT Prophylaxis: Heparin Holding post CVA NSGY stated till resolved on imaging could take several days  H - Head of Bed: > 30 Degrees  U - Ulcer Prophylaxis: Protonix (pantoprazole)   G - Glycemic Control: Insulin  S - Spontaneous Breathing Trial: Daily but no extubation planned  B - Bowel Regimen: MiraLax  I - Indwelling Catheter:   Tubes: ETT  Lines: Peripheral IV, Arterial Line, and Central Line  Drains: None      N- Sedated. Track exam.  Repeat CT prior to restarting AC  Resp - on vent. Failed extubation 3/2  CV - LVAD. Impella RP removed 3/1. Ongoing inotropic support  GI: TF  Renal: Renal failure on CRRT  Heme-  holding anticoagulation  ID - Septic shock.   On Fluc, flagyl, zosyn, and vanco      POD:  10 Days Post-Op    S/P:   Procedure(s):  LEFT GROIN RP IMPELLA INSERTION, KIARRA BY DR Mehrdad Conner    Active Problem List:     Problem List  Date Reviewed: 2023            Codes Class    Dyslipidemia, goal LDL below 70 ICD-10-CM: E78.5  ICD-9-CM: 272.4         PAD (peripheral artery disease) ([facility] [address] ICD-10-CM: I73.9  ICD-9-CM: 828.4         Systolic CHF, acute on chronic ([facility] [address] ICD-10-CM: I50.23  ICD-9-CM: 428.23, 428.0         Receiving inotropic medication ICD-10-CM: Z79.899  ICD-9-CM: V49.89         CHF (congestive heart failure) (HCC) ICD-10-CM: I50.9  ICD-9-CM: 428.0         CKD stage 3 due to type 2 diabetes mellitus (HCC) ICD-10-CM: E11.22, N18.30  ICD-9-CM: 250.40, 585.3         S/P CABG x 3 ICD-10-CM: Z95.1  ICD-9-CM: V45.81     Overview Signed 7/9/2018 11:20 AM by SCAR Wynn     Coronary Artery Bypass Grafting x 3, LIMA to LAD, RSVG to OM, RSVG to RCA  Right GSV EVH             Coronary artery disease involving native coronary artery of native heart without angina pectoris ICD-10-CM: I25.10  ICD-9-CM: 414.01         Hypertension, essential ICD-10-CM: I10  ICD-9-CM: 401.9         Colon cancer screening ICD-10-CM: Z12.11  ICD-9-CM: V76.51         Nonrheumatic aortic valve stenosis ICD-10-CM: I35.0  ICD-9-CM: 424.1         Bruit of right carotid artery ICD-10-CM: R09.89  ICD-9-CM: 785. 9         Type 2 diabetes mellitus with hyperglycemia (HCC) ICD-10-CM: E11.65  ICD-9-CM: 250.00         Deafness ICD-10-CM: H91.90  ICD-9-CM: 389.9         Cramp of limb ICD-10-CM: R25.2  ICD-9-CM: 729.82        Past Medical History:      has a past medical history of CAD (coronary artery disease) (11/10/2016), Deafness (10/28/2012), DM (diabetes mellitus) ([facility] [address] Elevated cholesterol, Hypertension, and NSTEMI (non-ST elevated myocardial infarction) ([facility] [address] (11/10/2016). Past Surgical History:      has a past surgical history that includes hx appendectomy; pr unlisted procedure cardiac surgery (11/11/2016); colonoscopy (N/A, 6/28/2018); and colonoscopy (N/A, 1/18/2023). Home Medications:     Prior to Admission medications    Medication Sig Start Date End Date Taking? Authorizing Provider   polyethylene glycol (Miralax) 17 gram/dose powder Take 17 g by mouth daily as needed for Constipation.    Yes Provider, Historical   furosemide (LASIX) 20 mg tablet Take 1 Tablet by mouth daily as needed (for weight greater than 120 lb by 2-3 lb or shortness of breath). 1/23/23  Yes Perlmutter, Collette Harrier B, NP   glipiZIDE (GLUCOTROL) 5 mg tablet Take 1 tablet by mouth twice daily 12/2/22  Yes Amando Ross MD   ipratropium (ATROVENT) 21 mcg (0.03 %) nasal spray 2 Sprays by Both Nostrils route every twelve (12) hours. 11/21/22  Yes Amando Ross MD   ergocalciferol (ERGOCALCIFEROL) 1,250 mcg (50,000 unit) capsule Take 1 Capsule by mouth every seven (7) days. Patient taking differently: Take 50,000 Units by mouth every seven (7) days. Mondays 10/14/22  Yes Amando Ross MD   Januvia 50 mg tablet Take 1 tablet by mouth once daily 9/23/22  Yes Amando Ross MD   rosuvastatin (CRESTOR) 20 mg tablet Take 1 Tablet by mouth nightly. 2/28/22  Yes Olu Parker MD   aspirin delayed-release 81 mg tablet Take 81 mg by mouth daily. Yes Provider, Historical   zolpidem (AMBIEN) 5 mg tablet Take 1 Tablet by mouth nightly as needed for Sleep. Max Daily Amount: 5 mg. 1/24/23   Amando Ross MD   nitroglycerin (NITROSTAT) 0.4 mg SL tablet 1 Tablet by SubLINGual route every five (5) minutes as needed for Chest Pain. Up to 3 doses. 11/16/22   Katelynn Callaway MD       Allergies/Social/Family History: Allergies   Allergen Reactions    Ambien [Zolpidem] Other (comments)     Causes extreme confusion      Social History     Tobacco Use    Smoking status: Never     Passive exposure: Never    Smokeless tobacco: Never   Substance Use Topics    Alcohol use:  Yes     Alcohol/week: 2.0 standard drinks     Types: 1 Cans of beer, 1 Shots of liquor per week     Comment: rarely      Family History   Problem Relation Age of Onset    Heart Disease Father     Heart Attack Father     Hypertension Mother     Elevated Lipids Brother     Elevated Lipids Brother     No Known Problems Sister     Elevated Lipids Brother     No Known Problems Son     No Known Problems Daughter     Anesth Problems Neg Hx          Objective:   Vital Signs: Visit Vitals  BP (!) 76/56   Pulse 96   Temp 98.9 °F (37.2 °C)   Resp 23   Ht 5' 6\" (1.676 m)   Wt 62.8 kg (138 lb 7.2 oz)   SpO2 100%   BMI 22.35 kg/m²    O2 Flow Rate (L/min): 20 l/min O2 Device: Endotracheal tube, Ventilator Temp (24hrs), Av.2 °F (37.3 °C), Min:98.8 °F (37.1 °C), Max:99.8 °F (37.7 °C)    CVP (mmHg): 11 mmHg (23 1300)      Intake/Output:     Intake/Output Summary (Last 24 hours) at 3/3/2023 1341  Last data filed at 3/3/2023 1300  Gross per 24 hour   Intake 2939.66 ml   Output 3226.6 ml   Net -286.94 ml         Physical Exam:    GEN:  ill appearing  Neuro:  Sedated  CV Reg  Resp Course  Abd soft   Extremities minimal edema    LABS AND  DATA: Personally reviewed  Recent Labs     23  034   WBC 8.0 6.7   HGB 8.2* 8.3*   HCT 27.6* 26.5*    190       Recent Labs     23  034    135*   K 4.3 4.4    104   CO2 23 22   BUN 26* 25*   CREA 1.67* 1.62*   * 189*   CA 11.5* 10.8*   MG 3.0* 2.7*   PHOS 3.6 3.9       Recent Labs     23  0403 03/02/23  034    112   TP 6.7 6.3*   ALB 3.9 4.0   GLOB 2.8 2.3       Recent Labs     23  0403 23  0719 23  034   INR 1.6*  --  1.7*   PTP 16.7*  --  17.1*   APTT 38.3* 75.1*  --         Recent Labs     23  0410 23  1416   PHI 7.42 7.26*   PCO2I 36.3 48.9*   PO2I 150* 81       No results for input(s): CPK, CKMB, TROIQ, BNPP in the last 72 hours.     Hemodynamics:   PAP: PAP Systolic: 38 ( 8225) CO: CO (l/min): 2.4 l/min (23 0700)   Wedge: PAOP (PCWP) (mmhg): 36 mmHg (23 1830) CI: CI (l/min/m2): 1.5 l/min/m2 (23 0700)   CVP:  CVP (mmHg): 11 mmHg (23 1300) SVR:       PVR:       Ventilator Settings:  Mode Rate Tidal Volume Pressure FiO2 PEEP   Volume control, Assist control   350 ml  8 cm H2O 50 % 5 cm H20     Peak airway pressure: 21 cm H2O    Minute ventilation: 7 l/min        MEDS: Reviewed    Chest X-Ray:  CXR Results  (Last 48 hours)                 03/03/23 0500  XR CHEST PORT Final result    Impression:  Impella device not visualized. Worsening interstitial pulmonary edema. Unchanged   bibasilar volume loss and effusions. Narrative:  EXAM: XR CHEST PORT       DATE: 3/3/2023 5:00 AM       INDICATION: s/p VAD intubated, impella       COMPARISON: 3/2/2023       TECHNIQUE: A portable AP radiograph of the chest was obtained. FINDINGS:    Stable endotracheal tube, bilateral IJ approach central venous catheters,   subxiphoid drains. Feeding tube in place. Left ventricular assist device in   place. Stable mediastinal contours. Worsening interstitial pulmonary edema. Bibasilar volume loss and small effusions. No pneumothorax. 03/02/23 1407  XR CHEST PORT Final result    Impression:      Decreased pulmonary edema. Decreased airspace opacities and bilateral pleural   effusions. Narrative:  EXAM:  XR CHEST PORT       INDICATION: Intubation. COMPARISON: 3/2/2023       TECHNIQUE: Upright portable chest AP view       FINDINGS: Tubes and lines are stable. Decreased pulmonary edema. Heart size is   normal.        Decreased airspace opacities and effusions bilaterally. The visualized bones and   upper abdomen are age-appropriate. 03/02/23 0424  XR CHEST PORT Final result    Impression:      Probable increased pulmonary edema. Increased pleural effusions and bibasilar   airspace opacities. Narrative:  EXAM:  XR CHEST PORT       INDICATION: Status post LVAD       COMPARISON: 3/1/2023       TECHNIQUE: Semiupright portable chest AP view       FINDINGS: Large bore enteric feeding tube is in place. Impella device is   removed. Other tubes and lines are stable. The cardiac silhouette is within   normal limits. Decreased lung volumes. Probable increased pulmonary edema. Increased bilateral pleural effusions and   bibasilar volume loss.  The visualized bones and upper abdomen are   age-appropriate. SPECIAL EQUIPMENT  None    DISPOSITION  Stay in ICU    CRITICAL CARE CONSULTANT NOTE  I had a face to face encounter with the patient, reviewed and interpreted patient data including clinical events, labs, images, vital signs, I/O's, and examined patient. I have discussed the case and the plan and management of the patient's care with the consulting services, the bedside nurses and the respiratory therapist.      NOTE OF PERSONAL INVOLVEMENT IN CARE   This patient has a high probability of imminent, clinically significant deterioration, which requires the highest level of preparedness to intervene urgently. I participated in the decision-making and personally managed or directed the management of the following life and organ supporting interventions that required my frequent assessment to treat or prevent imminent deterioration. I personally spent 30 minutes of critical care time. This is time spent at this critically ill patient's bedside actively involved in patient care as well as the coordination of care and discussions with the patient's family. This does not include any procedural time which has been billed separately.       [address] [facility]

## 2023-03-03 NOTE — PROGRESS NOTES
Neurology Progress Note  Mauricio Whitmore NP    Admit Date: 1/26/2023   LOS: 36 days      Daily Progress Note: 3/3/2023    S/P: Procedure(s):  LEFT GROIN RP IMPELLA INSERTION, KIARRA BY DR Gutierrez Murdock    C/C:   Left arm weakness     HPI:   71 y.o.   M w/ pmh of ICM, CAD s/p PCIx2, HFrEF 20-30% stage D, NYHA class IV, recently s/p HM3 LVAD as DT 2/15/23,  HTN, HLD, TRISTAN, and CKD3 who presented to [facility] on [facility]/26/23 with complaints of orthopnea, BLEE, elevated heart rate and difficulty with urination concerning for CHF exacerbation following recent admission in 12/2022 for cardiogenic shock. He was placed on home IV milrinone as bridge to LVAD placement. At the beginning of this stay he developed -190s and started on IV amiodarone. S/p LVAD placement on 2/17/23. Post-op complications include failed extubation, right heart failure prompting Impella placement as well as CRRT for kidney failure. 3/1, 3/2 - Code stroke called at apprx 2355 for new vs worsening LUE weakness and unequal pupils; LKWT 0800 per nursing staff. CTH revealed evidence of acute subarachnoid hemorrhage right convexity. CTA/CTP showing occlusion at distal right SUPA, subacute infarction. Unable to take pt for MRI brain r/t LVAD.      03/02~ Repeat CTH this morning shows evolving medial, right fronto-parietal hemorrhage. Neurology and [address] [place] [address] consulted for evaluation of cerebral hemorrhage. SUBJECTIVE:   No acute events noted overnight. Remained intubated on vent. On precedex 0.8mcg and able to follow commands and track examiner across the room.      Allergies   Allergen Reactions    Ambien [Zolpidem] Other (comments)     Causes extreme confusion       Past Medical History:   Diagnosis Date    CAD (coronary artery disease) 11/10/2016    NSTEMI & 2 stents    Deafness 10/28/2012    DM (diabetes mellitus) ([facility] [address]     Elevated cholesterol     Hypertension     [facility] 11/10/2016     Family History Problem Relation Age of Onset    Heart Disease Father     Heart Attack Father     Hypertension Mother     Elevated Lipids Brother     Elevated Lipids Brother     No Known Problems Sister     Elevated Lipids Brother     No Known Problems Son     No Known Problems Daughter     Anesth Problems Neg Hx      Social History     Tobacco Use    Smoking status: Never     Passive exposure: Never    Smokeless tobacco: Never   Substance Use Topics    Alcohol use: Yes     Alcohol/week: 2.0 standard drinks     Types: 1 Cans of beer, 1 Shots of liquor per week     Comment: rarely      Prior to Admission Medications   Prescriptions Last Dose Informant Patient Reported? Taking? Januvia 50 mg tablet   No Yes   Sig: Take 1 tablet by mouth once daily   aspirin delayed-release 81 mg tablet   Yes Yes   Sig: Take 81 mg by mouth daily. ergocalciferol (ERGOCALCIFEROL) 1,250 mcg (50,000 unit) capsule   No Yes   Sig: Take 1 Capsule by mouth every seven (7) days. Patient taking differently: Take 50,000 Units by mouth every seven (7) days.    finasteride (PROSCAR) 5 mg tablet   No Yes   Sig: Take 1 Tablet by mouth daily (with dinner) for 30 days. furosemide (LASIX) 20 mg tablet   No Yes   Sig: Take 1 Tablet by mouth daily as needed (for weight greater than 120 lb by 2-3 lb or shortness of breath). glipiZIDE (GLUCOTROL) 5 mg tablet   No Yes   Sig: Take 1 tablet by mouth twice daily   ipratropium (ATROVENT) 21 mcg (0.03 %) nasal spray   No Yes   Si Sprays by Both Nostrils route every twelve (12) hours. metFORMIN (GLUCOPHAGE) 500 mg tablet   No Yes   Sig: Take 1 Tablet by mouth two (2) times daily (with meals) for 30 days. nitroglycerin (NITROSTAT) 0.4 mg SL tablet   No No   Si Tablet by SubLINGual route every five (5) minutes as needed for Chest Pain. Up to 3 doses. pantoprazole (PROTONIX) 40 mg tablet   No Yes   Sig: Take 1 Tablet by mouth Daily (before breakfast) for 30 days.    polyethylene glycol (Miralax) 17 gram/dose powder   Yes Yes   Sig: Take 17 g by mouth daily as needed for Constipation. rosuvastatin (CRESTOR) 20 mg tablet   No Yes   Sig: Take 1 Tablet by mouth nightly. zolpidem (AMBIEN) 5 mg tablet   No No   Sig: Take 1 Tablet by mouth nightly as needed for Sleep. Max Daily Amount: 5 mg.       Facility-Administered Medications: None         OBJECTIVES:   Temp (24hrs), Av.2 °F (37.3 °C), Min:98.4 °F (36.9 °C), Max:100.2 °F (37.9 °C)   1901 -  07  In: 619 [I.V.:359]  Out: 508.9 [Drains:100]  701 - 1900  In: 5816.1 [I.V.:4542.1]  Out: 5378 [Drains:250]  Visit Vitals  BP (!) 90/59   Pulse 87   Temp 99.6 °F (37.6 °C)   Resp 19   Ht 5' 6\" (1.676 m)   Wt 62.6 kg (138 lb)   SpO2 100%   BMI 22.27 kg/m²    O2 Flow Rate (L/min): 20 l/min O2 Device: Endotracheal tube, Ventilator   Vitals:    23 2100 23 2200 23 2300 23 0000   BP:       Pulse: 99 92 89 87   Resp: 20 22 20 19   Temp:    99.6 °F (37.6 °C)   SpO2: 100% 100% 100% 100%   Weight:       Height:            Meds:     Current Facility-Administered Medications   Medication Dose Route Frequency    heparin 25,000 units in D5W 250 ml infusion  12-25 Units/kg/hr IntraVENous TITRATE    aspirin chewable tablet 81 mg  81 mg Per NG tube DAILY    rocuronium 10 mg/mL injection        EPINEPHrine (ADRENALIN) 0.1 mg/mL 0.1 mg/mL syringe        levETIRAcetam (KEPPRA) injection 500 mg  500 mg IntraVENous Q12H    bicarbonate dialysis (PRISMASOL) BG K 4/Ca 2.5 5000 ml solution   Extracorporeal DIALYSIS CONTINUOUS    insulin NPH (NOVOLIN N, HUMULIN N) injection 16 Units  16 Units SubCUTAneous Q12H    insulin lispro (HUMALOG) injection   SubCUTAneous Q4H    dextrose 10 % infusion 0-250 mL  0-250 mL IntraVENous PRN    epoetin heidy-epbx (RETACRIT) injection 10,000 Units  10,000 Units SubCUTAneous Q TUE, THU & SAT    EPINEPHrine (ADRENALIN) 10 mg in 0.9% sodium chloride 250 mL infusion  0-10 mcg/min IntraVENous TITRATE    amiodarone (CORDARONE) 900 mg/250 ml D5W infusion  0.5-1 mg/min IntraVENous TITRATE    vasopressin (VASOSTRICT) 20 Units in 0.9% sodium chloride 100 mL infusion  0-0.04 Units/min IntraVENous TITRATE    PHENYLephrine (JONELLE-SYNEPHRINE) 100 mg in 0.9% sodium chloride 250 mL infusion   mcg/min IntraVENous TITRATE    niCARdipine (CARDENE) 50 mg in 0.9% sodium chloride 100 mL infusion  0-15 mg/hr IntraVENous TITRATE    dextrose 10 % infusion 0-250 mL  0-250 mL IntraVENous PRN    propofol (DIPRIVAN) 10 mg/mL infusion  0-50 mcg/kg/min IntraVENous TITRATE    HYDROmorphone 30 mg/30 mL (1 mg/mL) infusion  0.5-1 mg/hr IntraVENous CONTINUOUS    dextrose (D50W) injection syrg 25 g  25 g IntraVENous PRN    neomycin-polymyxin-dexamethasone (DEXACINE) 3.5 mg/g-10,000 unit/g-0.1 % ophthalmic ointment   Both Eyes BID    NOREPINephrine (LEVOPHED) 8 mg in 5% dextrose 250mL (32 mcg/mL) infusion  0.5-16 mcg/min IntraVENous TITRATE    colchicine tablet 0.6 mg  0.6 mg Oral DAILY    fluconazole (DIFLUCAN) 400mg/200 mL IVPB (premix)  400 mg IntraVENous Q24H    vancomycin (VANCOCIN) 750 mg in 0.9% sodium chloride 250 mL ([id number])  750 mg IntraVENous Q24H    piperacillin-tazobactam (ZOSYN) 3.375 g in 0.9% sodium chloride (MBP/ADV) 100 mL MBP  3.375 g IntraVENous Q8H    Vancomycin - pharmacy to dose   Other Rx Dosing/Monitoring    metroNIDAZOLE (FLAGYL) IVPB premix 500 mg  500 mg IntraVENous Q12H    heparin (porcine) 1,000 unit/mL injection 1,700 Units  1,700 Units InterCATHeter DIALYSIS PRN    And    heparin (porcine) 1,000 unit/mL injection 1,500 Units  1,500 Units InterCATHeter DIALYSIS PRN    balsam peru-castor oiL (VENELEX) ointment   Topical BID    acetaminophen (TYLENOL) solution 650 mg  650 mg Oral Q4H PRN    acetaminophen (TYLENOL) suppository 650 mg  650 mg Rectal Q4H PRN    HYDROmorphone (DILAUDID) injection 0.5 mg  0.5 mg IntraVENous Q3H PRN    HYDROmorphone (DILAUDID) injection 1 mg  1 mg IntraVENous Q4H PRN    [Held by provider] heparin 25,000 units in D5W 250 ml infusion  12-25 Units/kg/hr IntraVENous TITRATE    albumin human 25% (BUMINATE) solution 12.5 g  12.5 g IntraVENous Q6H    bumetanide (BUMEX) injection 0.5 mg  0.5 mg IntraVENous Q4H PRN    glucose chewable tablet 16 g  4 Tablet Oral PRN    glucagon (GLUCAGEN) injection 1 mg  1 mg IntraMUSCular PRN    sodium chloride (NS) flush 5-40 mL  5-40 mL IntraVENous Q8H    sodium chloride (NS) flush 5-40 mL  5-40 mL IntraVENous PRN    naloxone (NARCAN) injection 0.4 mg  0.4 mg IntraVENous PRN    ELECTROLYTE REPLACEMENT NOTE: Nurse to review Serum Potassium and Magnesuim levels and Initiate Electrolyte Replacement Protocol as needed  1 Each Other PRN    dextrose 10 % infusion 0-250 mL  0-250 mL IntraVENous PRN    acetaminophen (TYLENOL) tablet 650 mg  650 mg Oral Q6H PRN    ondansetron (ZOFRAN) injection 4 mg  4 mg IntraVENous Q4H PRN    albuterol-ipratropium (DUO-NEB) 2.5 MG-0.5 MG/3 ML  3 mL Nebulization Q6H PRN    senna-docusate (PERICOLACE) 8.6-50 mg per tablet 1 Tablet  1 Tablet Oral DAILY    polyethylene glycol (MIRALAX) packet 17 g  17 g Oral DAILY    bisacodyL (DULCOLAX) suppository 10 mg  10 mg Rectal DAILY PRN    0.45% sodium chloride infusion  10 mL/hr IntraVENous CONTINUOUS    DOBUTamine (DOBUTREX) 500 mg/250 mL (2,000 mcg/mL) infusion  0-10 mcg/kg/min IntraVENous TITRATE    dexmedeTOMidine in 0.9 % NaCl (PRECEDEX) 400 mcg/100 mL (4 mcg/mL) infusion soln  0.1-1.5 mcg/kg/hr IntraVENous TITRATE    0.9% sodium chloride infusion  9 mL/hr IntraVENous CONTINUOUS     I personally reviewed all of the medications    Review of Systems:   Review of systems not obtained due to patient factors. Unresponsive on ventilator and sedated on 0.8mcg of dex therefore too cognitively or communication impaired to participate in ROS. Physical Exam:   Blood pressure (!) 90/59, pulse 87, temperature 99.6 °F (37.6 °C), resp. rate 19, height 5' 6\" (1.676 m), weight 62.6 kg (138 lb), SpO2 100 %.     GEN: Krysta Garcia, Well developed and nourished patient in NAD  HEENT: Normocephalic. Non-icter, no congestion  Lungs: \"LVAD hum\" bilaterally Ant; intubated on vent,  Cardiac: S1,S2, irreg/reg rate and rhythm, with no murmurs. no carotid bruits, no gallops  Abdomen: Normal bowel sounds, no distention, soft, non-tender  Extremities: 2+ Radial pulses, no clubbing, cyanosis, or edema  Skin: no rashes or lesions noted      NEURO:  Mental status: patient is awake and able to follow commands  Cranial Nerves: no speech 2/2 ET-tube inplace, EOMI, PERRL, no nystagmus, no ptosis. No facial asymmetry noted around ET-tube. Hearing intact bilaterally. _ visual threat,   Motor:  Normal bulk and tone, with x 4 extremities to pain weaker in BLE weaker on left-sided; No involuntary movements. Reflexes:  +2 throughout, down going toes bilaterally   Sensation: Intact x 4 extremities to pain  Gait:  Deferred     Labs/images:     Lab Results   Component Value Date/Time    WBC 6.7 03/02/2023 03:46 AM    HGB 8.3 (L) 03/02/2023 03:46 AM    HCT 26.5 (L) 03/02/2023 03:46 AM    PLATELET 865 14/49/5119 03:46 AM    MCV 98.5 03/02/2023 03:46 AM      Lab Results   Component Value Date/Time    Sodium 135 (L) 03/02/2023 03:46 AM    Potassium 4.4 03/02/2023 03:46 AM    Chloride 104 03/02/2023 03:46 AM    CO2 22 03/02/2023 03:46 AM    Anion gap 9 03/02/2023 03:46 AM    Glucose 189 (H) 03/02/2023 03:46 AM    BUN 25 (H) 03/02/2023 03:46 AM    Creatinine 1.62 (H) 03/02/2023 03:46 AM    BUN/Creatinine ratio 15 03/02/2023 03:46 AM    GFR est AA 46 (L) 06/23/2022 09:40 AM    GFR est non-AA 38 (L) 06/23/2022 09:40 AM    Calcium 10.8 (H) 03/02/2023 03:46 AM    Bilirubin, total 13.6 (H) 03/02/2023 03:46 AM    Alk.  phosphatase 112 03/02/2023 03:46 AM    Protein, total 6.3 (L) 03/02/2023 03:46 AM    Albumin 4.0 03/02/2023 03:46 AM    Globulin 2.3 03/02/2023 03:46 AM    A-G Ratio 1.7 03/02/2023 03:46 AM    ALT (SGPT) 50 03/02/2023 03:46 AM    AST (SGOT) 109 (H) 03/02/2023 03:46 AM       CT Results (most recent):  Results from [place] encounter on 01/26/23    CT HEAD WO CONT    Narrative  EXAM: CT HEAD WO CONT    INDICATION: Evaluate for subarachnoid hemorrhage    COMPARISON: 3/2/2023. CONTRAST: None. TECHNIQUE: Unenhanced CT of the head was performed using 5 mm images. Brain and  bone windows were generated. Coronal and sagittal reformats. CT dose reduction  was achieved through use of a standardized protocol tailored for this  examination and automatic exposure control for dose modulation. FINDINGS:  Increased in density in the left parasagittal sulci suggestive of subarachnoid  hemorrhage, slightly increased in volume compared to 3/2/2023 at midnight. Mild  adjacent edema. Periventricular white matter hypodensities indicative of chronic microvascular  angiopathy. The basilar cisterns are open. No CT evidence of acute infarct. The bone windows demonstrate no abnormalities. Bilateral sphenoid sinus mucosal  thickening. Impression  Right parasagittal sulcal hyperdensity consistent with subarachnoid hemorrhage. Mild adjacent edema. Assessment:   Active Problems:    CHF (congestive heart failure) ([facility] [address] (12/12/2022)      Plan:   Unable to obtain MRI brain r/t LVAD, Impella  Continue with keppra 500mg BID  Keep SBP goal <140 mm hg  Antiplatelet/Anticoagulation per neurosurgery and primary team  Start home statin medication if not contraindicated   Assess pupils with pupillometer q 4 hours  q 1 neuro checks  Supportive care  ST/OT/PT when able  DVT ppx  Stroke education   Low threshold to repeat CT head if any worsening change in neurologic condition    Neurology will sign off. Please do not hesitate to call with questions or concerns. Please let us know if we can provide any additional information.         Chart reviewed    Case discussed with: intensivist and primary nurse     >35% time spent in counseling or coordination of care of the above in the assessment and plan     Signed By: Clayton Ng NP                    March 3, 2023    Please note that this dictation was completed with [employer], the computer voice recognition software. Quite often unanticipated grammatical, syntax, homophones, and other interpretive errors are inadvertently transcribed by the computer software. Please disregard these errors. Please excuse any errors that have escaped final proofreading. Attending Attestation  I reviewed and agree with the history, exam findings, impression, and plan of care recorded in the advanced practitioners note and addended the note. The patient's case and plan of care was formulated under my guidance and I made edits as needed in the above note. We will finish work up as mentioned in the above note. Additional comments are noted in my below documentation. It is a great pleasure to see Moses Benson, a 71 y.o. male today in the hospital. Neuro images were reviewed. patient is awake and able to follow commands. Neurological presentation consistent with sulcal SAH  in right fronto-parietal area. Keppra 500 mg BID. Unable to obtain MRI brain due to  LVAD. Neurosurgery consulted. SBP goal <140 mm hg. Further [facility] management per Neurosurgery. Yesterday discussed at length with Dr. Leydi oRss from neuro standpoint high risk for  bleeding and so heparin drip stopped yesterday. Antiplatelet/Anticoagulation per neurosurgery and primary team. Please continue the plan and management as mentioned in advanced practitioners note. I personally evaluated the patient and participated in key elements of management. I discussed problems, diagnosis and management with the advanced practitioner. As always, I greatly appreciate the opportunity to participate in the care of the patient. Neurology will sign off. Please do not hesitate to call with questions or concerns. Please let us know if we can provide any additional information.

## 2023-03-03 NOTE — PROGRESS NOTES
2000 - Bedside and Verbal shift change report given to Marciano Dandy, RN (oncoming nurse) by Lucita Mann RN (offgoing nurse). Report included the following information SBAR, Kardex, OR Summary, Procedure Summary, Intake/Output, MAR, Recent Results, and Cardiac Rhythm 1st degree AV block . 2200 - Neuro NP at bedside. No new orders at this time.

## 2023-03-03 NOTE — PROGRESS NOTES
RENAL  PROGRESS NOTE        Subjective:     Remains Intubated. On CRRT. Noticed CNS event  Objective:   VITALS SIGNS:    Visit Vitals  BP (!) 76/56   Pulse 80   Temp 98.8 °F (37.1 °C)   Resp 20   Ht 5' 6\" (1.676 m)   Wt 62.8 kg (138 lb 7.2 oz)   SpO2 100%   BMI 22.35 kg/m²       O2 Device: Endotracheal tube, Ventilator   O2 Flow Rate (L/min): 20 l/min   Temp (24hrs), Av °F (37.2 °C), Min:98.4 °F (36.9 °C), Max:99.8 °F (37.7 °C)         PHYSICAL EXAM:  Intubated  +ETT  + LE edema      DATA REVIEW:     INTAKE / OUTPUT:   Last shift:       07 - 1900  In: 345.6 [I.V.:170.6]  Out: 136.3 [Drains:10]  Last 3 shifts:  190 -  0700  In: 4533.4 [I.V.:2940.4]  Out: 4592.8 [Drains:350]    Intake/Output Summary (Last 24 hours) at 3/3/2023 0944  Last data filed at 3/3/2023 0900  Gross per 24 hour   Intake 2873.83 ml   Output 2939.2 ml   Net -65.37 ml           LABS:   Recent Labs     23  0403 23  0346 23  0437   WBC 8.0 6.7 8.4   HGB 8.2* 8.3* 8.9*   HCT 27.6* 26.5* 27.3*    190 201       Recent Labs     23  0403 23  0346 23  0437    135* 137   K 4.3 4.4 3.2*    104 104   CO2 23 22 24   * 189* 133*   BUN 26* 25* 19   CREA 1.67* 1.62* 1.59*   CA 11.5* 10.8* 10.8*   MG 3.0* 2.7* 2.5*   PHOS 3.6 3.9 2.2*   ALB 3.9 4.0 3.7   TBILI 12.7* 13.6* 15.0*   ALT 74 50 52   INR 1.6* 1.7* 1.5*           Assessment:  TANNER on CKD-3a: Suspect cardiorenal effects with needed diuresis. Now has LVAD and  POST OP ATN. Anuric->Requiring CRRT   hypercalcemia/immobilisation  Ischemic CM: Recurrent exacerbations. EF 20%. S/p LVAD on 2/15. Required Impella RP for RV support-> attempts to wean not tolerated by patient. CVA  Sepsis: Urosepsis-> growing candida    BPH     Well differentiated NET Grade 1: noted on EGD 23     Anemia 2 to CKD:  s/p PRBCS      Left UE basilic and radial vein thrombus    Thrombocytopenia       Plan/Recommendations:  Seen on CVVH .    Qd 1500 cc/hr  Factor 25   4 K Prismasol bags     Daily CRRT labs    BRIGHT  PRBC as per cardiac team    Discussed with LORI medina MD  [id number] [facility]

## 2023-03-04 NOTE — PROGRESS NOTES
[facility]    ICU TEAM Progress Note    Name: Eloisa August   : 1950   MRN: [id number]   Date: 3/4/2023      Assessment and Plan:     Briefly, Pt is 71 y.o w HFrEF who underwent LVAD placement on 2/15. Post op course complicated by TANNER requiring CRRRT as well as RV failure requiring Impella RP. Impella removed on 3/1. Overnight 3/1-3/1 CVA noted with SAH.  3/2 SAH enlarged. Held anticoagulation. ICU Problems:    Acute on chronic HFrEF (20%) NYHA IIIb/IV (D) on home inotropic support, life vest  TANNER on CKD3  CHB post op  Aflutter w/ RVR  Acute on chronic hypoxemic respiratory failure  CAP  CAD  Gastric neuroendocrine tumor  Hx of LUE DVT  DM  PAD  TRISTAN  BPH w/ urinary retention requiring chronic donnelly      ICU Checklist       F - Feeding:  Yes TF  A - Analgesia: None  S - Sedation: Propofol and Precedex  T - DVT Prophylaxis: Heparin Holding post CVA NSGY stated till resolved on imaging could take several days  H - Head of Bed: > 30 Degrees  U - Ulcer Prophylaxis: Protonix (pantoprazole)   G - Glycemic Control: Insulin  S - Spontaneous Breathing Trial: Daily but no extubation planned  B - Bowel Regimen: MiraLax  I - Indwelling Catheter:   Tubes: ETT  Lines: Peripheral IV, Arterial Line, and Central Line  Drains: None        N- Sedated. Track exam.  Repeat CT prior to restarting AC  Resp - on vent. Failed extubation 3/2. WILL NEED TRACH  CV - LVAD. Impella RP removed 3/1. Ongoing weaning epi  GI: TF  Renal: Renal failure on CRRT  Heme-  holding anticoagulation  ID - Septic shock. On Fluc, flagyl, zosyn, and vanco.  Has completed prolonged course.         POD:  10 Days Post-Op    S/P:   Procedure(s):  LEFT GROIN RP IMPELLA INSERTION, KIARRA BY DR Symone Berry    Active Problem List:     Problem List  Date Reviewed: 2023            Codes Class    Dyslipidemia, goal LDL below 70 ICD-10-CM: E78.5  ICD-9-CM: 272.4         PAD (peripheral artery disease) ([facility] [address] ICD-10-CM: I73.9  ICD-9-CM: 443.9 Systolic CHF, acute on chronic (HCC) ICD-10-CM: I50.23  ICD-9-CM: 428.23, 428.0         Receiving inotropic medication ICD-10-CM: Z79.899  ICD-9-CM: V49.89         CHF (congestive heart failure) (HCC) ICD-10-CM: I50.9  ICD-9-CM: 428.0         CKD stage 3 due to type 2 diabetes mellitus (HCC) ICD-10-CM: E11.22, N18.30  ICD-9-CM: 250.40, 585.3         S/P CABG x 3 ICD-10-CM: Z95.1  ICD-9-CM: V45.81     Overview Signed 7/9/2018 11:20 AM by SCAR Horan     Coronary Artery Bypass Grafting x 3, LIMA to LAD, RSVG to OM, RSVG to RCA  Right GSV EVH             Coronary artery disease involving native coronary artery of native heart without angina pectoris ICD-10-CM: I25.10  ICD-9-CM: 414.01         Hypertension, essential ICD-10-CM: I10  ICD-9-CM: 401.9         Colon cancer screening ICD-10-CM: Z12.11  ICD-9-CM: V76.51         Nonrheumatic aortic valve stenosis ICD-10-CM: I35.0  ICD-9-CM: 424.1         Bruit of right carotid artery ICD-10-CM: R09.89  ICD-9-CM: 785. 9         Type 2 diabetes mellitus with hyperglycemia (HCC) ICD-10-CM: E11.65  ICD-9-CM: 250.00         Deafness ICD-10-CM: H91.90  ICD-9-CM: 389.9         Cramp of limb ICD-10-CM: R25.2  ICD-9-CM: 729.82        Past Medical History:      has a past medical history of CAD (coronary artery disease) (11/10/2016), Deafness (10/28/2012), DM (diabetes mellitus) ([facility] [address] Elevated cholesterol, Hypertension, and NSTEMI (non-ST elevated myocardial infarction) ([facility] [address] (11/10/2016). Past Surgical History:      has a past surgical history that includes hx appendectomy; pr unlisted procedure cardiac surgery (11/11/2016); colonoscopy (N/A, 6/28/2018); and colonoscopy (N/A, 1/18/2023). Home Medications:     Prior to Admission medications    Medication Sig Start Date End Date Taking? Authorizing Provider   polyethylene glycol (Miralax) 17 gram/dose powder Take 17 g by mouth daily as needed for Constipation.    Yes Provider, Historical   furosemide (LASIX) 20 mg tablet Take 1 Tablet by mouth daily as needed (for weight greater than 120 lb by 2-3 lb or shortness of breath). 1/23/23  Yes Rosaura Linton NP   glipiZIDE (GLUCOTROL) 5 mg tablet Take 1 tablet by mouth twice daily 12/2/22  Yes Griffin Saab MD   ipratropium (ATROVENT) 21 mcg (0.03 %) nasal spray 2 Sprays by Both Nostrils route every twelve (12) hours. 11/21/22  Yes Griffin Saab MD   ergocalciferol (ERGOCALCIFEROL) 1,250 mcg (50,000 unit) capsule Take 1 Capsule by mouth every seven (7) days. Patient taking differently: Take 50,000 Units by mouth every seven (7) days. Mondays 10/14/22  Yes Griffin Saab MD   Januvia 50 mg tablet Take 1 tablet by mouth once daily 9/23/22  Yes Griffin Saab MD   rosuvastatin (CRESTOR) 20 mg tablet Take 1 Tablet by mouth nightly. 2/28/22  Yes Donn Zaldivar MD   aspirin delayed-release 81 mg tablet Take 81 mg by mouth daily. Yes Provider, Historical   zolpidem (AMBIEN) 5 mg tablet Take 1 Tablet by mouth nightly as needed for Sleep. Max Daily Amount: 5 mg. 1/24/23   Griffin Saab MD   nitroglycerin (NITROSTAT) 0.4 mg SL tablet 1 Tablet by SubLINGual route every five (5) minutes as needed for Chest Pain. Up to 3 doses. 11/16/22   Laurie Newby MD       Allergies/Social/Family History: Allergies   Allergen Reactions    Ambien [Zolpidem] Other (comments)     Causes extreme confusion      Social History     Tobacco Use    Smoking status: Never     Passive exposure: Never    Smokeless tobacco: Never   Substance Use Topics    Alcohol use:  Yes     Alcohol/week: 2.0 standard drinks     Types: 1 Cans of beer, 1 Shots of liquor per week     Comment: rarely      Family History   Problem Relation Age of Onset    Heart Disease Father     Heart Attack Father     Hypertension Mother     Elevated Lipids Brother     Elevated Lipids Brother     No Known Problems Sister     Elevated Lipids Brother     No Known Problems Son     No Known Problems Daughter     Inge Powell Problems Neg Hx          Objective:   Vital Signs:  Visit Vitals  BP (!) 98/55   Pulse 76   Temp 98.2 °F (36.8 °C)   Resp 20   Ht 5' 6\" (1.676 m)   Wt 62.3 kg (137 lb 5.6 oz)   SpO2 100%   BMI 22.17 kg/m²    O2 Flow Rate (L/min): 20 l/min O2 Device: Endotracheal tube, Ventilator Temp (24hrs), Av.6 °F (37 °C), Min:98.2 °F (36.8 °C), Max:98.9 °F (37.2 °C)    CVP (mmHg): 9 mmHg (23 1000)      Intake/Output:     Intake/Output Summary (Last 24 hours) at 3/4/2023 1020  Last data filed at 3/4/2023 1000  Gross per 24 hour   Intake 3244.27 ml   Output 2958 ml   Net 286.27 ml         Physical Exam:    GEN:  ill appearing  Neuro:  Sedated  CV Reg  Resp Course  Abd soft   Extremities minimal edema    LABS AND  DATA: Personally reviewed  Recent Labs     23   WBC 7.9 8.0   HGB 8.8* 8.2*   HCT 29.1* 27.6*    230       Recent Labs     232 23  0403    137   K 4.5 4.3    105   CO2 24 23   BUN 24* 26*   CREA 1.67* 1.67*   * 206*   CA 12.1* 11.5*   MG 3.1* 3.0*   PHOS 3.2 3.6       Recent Labs     23  0403   * 112   TP 7.0 6.7   ALB 4.1 3.9   GLOB 2.9 2.8       Recent Labs     23  0402 23  0403   INR 1.5* 1.6*   PTP 15.7* 16.7*   APTT 33.9* 38.3*        Recent Labs     23  0410 23  1416   PHI 7.42 7.26*   PCO2I 36.3 48.9*   PO2I 150* 81       No results for input(s): CPK, CKMB, TROIQ, BNPP in the last 72 hours.     Hemodynamics:   PAP: PAP Systolic: 38 (/ 6031) CO: CO (l/min): 2.4 l/min (23 0700)   Wedge: PAOP (PCWP) (mmhg): 36 mmHg (23 1830) CI: CI (l/min/m2): 1.5 l/min/m2 (23 0700)   CVP:  CVP (mmHg): 9 mmHg (23 1000) SVR:       PVR:       Ventilator Settings:  Mode Rate Tidal Volume Pressure FiO2 PEEP   Assist control   350 ml  8 cm H2O 50 % 5 cm H20     Peak airway pressure: 22 cm H2O    Minute ventilation: 7.49 l/min        MEDS: Reviewed    Chest X-Ray:  CXR Results (Last 48 hours)                 03/04/23 0509  XR CHEST PORT Final result    Impression:      Overall unchanged radiographic appearance. Narrative:  EXAM:  XR CHEST PORT       INDICATION: Status post VAD intubated       COMPARISON: 3/3/2023       TECHNIQUE: 0433 hours portable chest AP view       FINDINGS: Please   Median sternotomy wires and LVAD are again shown. An endotracheal tube,   transesophageal tube, left IJ lines, mediastinal and left chest tubes appear   unchanged . Cardiac and mediastinal contours are stable. Mild interstitial   pulmonary edema is again shown an overall unchanged. A small right pleural effusion is again suggested. No pneumothorax is shown . 03/03/23 0500  XR CHEST PORT Final result    Impression:  Impella device not visualized. Worsening interstitial pulmonary edema. Unchanged   bibasilar volume loss and effusions. Narrative:  EXAM: XR CHEST PORT       DATE: 3/3/2023 5:00 AM       INDICATION: s/p VAD intubated, impella       COMPARISON: 3/2/2023       TECHNIQUE: A portable AP radiograph of the chest was obtained. FINDINGS:    Stable endotracheal tube, bilateral IJ approach central venous catheters,   subxiphoid drains. Feeding tube in place. Left ventricular assist device in   place. Stable mediastinal contours. Worsening interstitial pulmonary edema. Bibasilar volume loss and small effusions. No pneumothorax. 03/02/23 1407  XR CHEST PORT Final result    Impression:      Decreased pulmonary edema. Decreased airspace opacities and bilateral pleural   effusions. Narrative:  EXAM:  XR CHEST PORT       INDICATION: Intubation. COMPARISON: 3/2/2023       TECHNIQUE: Upright portable chest AP view       FINDINGS: Tubes and lines are stable. Decreased pulmonary edema. Heart size is   normal.        Decreased airspace opacities and effusions bilaterally. The visualized bones and   upper abdomen are age-appropriate. SPECIAL EQUIPMENT  None    DISPOSITION  Stay in ICU    CRITICAL CARE CONSULTANT NOTE  I had a face to face encounter with the patient, reviewed and interpreted patient data including clinical events, labs, images, vital signs, I/O's, and examined patient. I have discussed the case and the plan and management of the patient's care with the consulting services, the bedside nurses and the respiratory therapist.      NOTE OF PERSONAL INVOLVEMENT IN CARE   This patient has a high probability of imminent, clinically significant deterioration, which requires the highest level of preparedness to intervene urgently. I participated in the decision-making and personally managed or directed the management of the following life and organ supporting interventions that required my frequent assessment to treat or prevent imminent deterioration. I personally spent 30 minutes of critical care time. This is time spent at this critically ill patient's bedside actively involved in patient care as well as the coordination of care and discussions with the patient's family. This does not include any procedural time which has been billed separately.       [address] [facility]

## 2023-03-04 NOTE — DIALYSIS
[facility] / [phone]         Orders   Mode: CVVHD rounding   Blood Flow Rate: 200 ml/min   Prismasol Dose: 1500 ml/hr per    Prismasol Concentrate: 4K / 2.5Ca   Blood Warmer Temp: 37*C   Net Fluid Removal: Currently 0 ml/hr          Metrics   BP: 98/55   HR: 79   Access Pressure: -44 (lines reversed)   Filter Pressure: 147   Return Pressure: 46   TMP: 37   Pressure Drop: 64          Access   Type & Location: [facility] non-tunneled CVC C/D/I with transparent dressing and biopatch in place dated 03/03/23. Labs   HBsAg (Antigen) / date: Negative  02/18/23                                              HBsAb (Antibody) / date: Susceptible  02/18/23   Source: [employer]          Safety:   Time Out Done:   (Time) 0205   Consent obtained/signed: Verified   Education: Access/Site Care   Primary Nurse Rpt Pre: LORI Monique   Primary Nurse Rpt Post: LORI Monique      Comments / Plan:   Patient, orders, line and consent verified. Labs, notes and code status reviewed. [id number] filter running well with no indications for change at this time. Lines visible/secure with blood warmer attached to the return line and set to 37C*. Education and Pre/Post with primary RN. Lines currently running reversed.

## 2023-03-04 NOTE — PROGRESS NOTES
0745: Bedside and Verbal shift change report received from [address] [place] [address] to [employer]. Report included the following information SBAR, Kardex, Intake/Output, MAR, Recent Results, Med Rec Status, Cardiac Rhythm 1st degree AVB, and Alarm Parameters . 0800: Vera Centeno MD at bedside, plan to wean epi gtt to 1 mcg, goal SBP < 110, and CVP goal 10-12. May consider PO antihypertensives following epi wean based on response. 0805: Pt alert, nods head appropriately to questions, tracks with eyes, R pupil > L pupil, weak spontaneous movement and command following to BUE and weak withdraw and occasional spontaneous movement to BLE.    0815: Epi weaned to 1 mcg. Improvement noted to Bp 80s-90s/50-60. Factor: 0    1020: MAP 56-69, Levophed started. 1100: [facility] hem[facility] following epi wean. Verbal orders received to increase fixed speed only from 4600 to 4700 and give 25% albumins x 2, attempt to wean Levophed gtt off.    1124: Levophed gtt weaned off. MAPs 65-68    1145: Peak pressures alarming on vent, scant secretions obtained with Bui. Lavage performed, suctioned with 14F catheter- large yellow secretions noted from ETT. Alarm resolved on vent. Peak pressures 23-25    1319: Attempted SBT, TV , RT resumed previous settings at this time. 1338: ID consult called. 1945: Bedside and Verbal shift change report given to [address] [place] [address] (oncoming nurse) by [employer] (offgoing nurse). Report included the following information SBAR, Kardex, Intake/Output, MAR, Recent Results, Med Rec Status, Cardiac Rhythm NSR, and Alarm Parameters .

## 2023-03-04 NOTE — PROGRESS NOTES
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 Júnior Stovall  C/b RV failure requiring Impella RP placed 23 and discontinued 3/1/23  C/b acute on chronic renal failure, requiring CRRT  C/b hepatic failure, TB 15.1 (peak 15.3)  C/b lactic acidosis, resolved  C/b persistent septic shock c/b vasodilation and high outputs, on multiple antibiotics, procalcitonin rising again  C/b R SUPA occlusion with small area of matched perfusion defect - subacute infarct c/b left sided hemiparesis  Patient was approved for LVAD implantation as destination therapy at [facility] 2/3/23; the following have been identified and d/w patient as relative concerns pertaining to LVAD candidacy: small body surface area, cachexia, BMI 18, malnutrition, frailty, advanced age, muscular deconditioning, PVD (fingertips), urinary retention requiring donnelly catheter, neuroendocrine tumor class 1 requiring treatment after LVAD, mild neurocognitive dysfunction, chronic kidney disease and hearing loss requiring hearing aid  Family updated yesterday; weekend plans: daily SBT, weaning inotropes, head CT and echo to decide next steps        RECOMMENDATIONS:  Continue LVAD at 4600rpm; LVEDD 4.8cm - increase speed when MAP improved  Decrease epi to 1; plan on wean over weekend; CI at goal by NICOM  Continue cardene gtt for goal SBP < 110mmHg and/or MAP < 90mmHg  Start hydralazine 10mg PO q6h; to help with cardene wean  Check echocardiogram and EKG on Monday  Continuous NICOMs; change patches every other day  No beta-blockers due to hypotension and RV conditioning prior to LVAD  Cannot tolerate ARB/ARNi due to hypotension and upcoming surgical procedure   Cannot tolerate spironolactone due to hyperkalemia  Cannot tolerate jardiance due to significant diuresis on smallest dose/contributed to IVVD  Previously discontinued corlanor due to SVT  Digoxin stopped d/t risk for toxicity with amio loading  Discontinued amio due to intermitted heart blocks under pacemaker  Nephrology consult appreciated, cont CRRT, goal CVP 10-12  Keep K+ >4 and Mg >2  Transfuse one unit pRBC for hgb > 7 prn  Continue antibiotics, zosyn, vanc, flagyl and diflucan   Will ask ID to comment on bladder UTI with candida and rising procalcitonin  Continue colchicine 0.6mg daily for possible pericarditis  Hold ASA d/t CVA   Continue to hold coumadin and heparin gtt  Cannot tolerate statins due to abnormal LFT  Check labs: vitamins, iron profile, inflammatory markers, CK  Timing of chest tube removal per Dr. Marianela Mathis  Continue TF   Continue bowel regimen   Daily dressing changes to Drive line exit site  Pulmonary hygiene   Timing of extubation per intensivist; preferrably when CT out  Neurology consultation re: further surveillance, head CT monday  VAD education with caregivers/patient when able by VAD coordinator  D/w Dr. Marianela Mathis at [facility] and bedside staff      INTERVAL HISTORY:  No events over night  Moves x 4; left sided weakness  SBT ongoing  Afebrile  MAPs 80s, HR 90s  CVP 12  Epi 2, precedex, cardene gtt  I/O net positive 200cc, dialysis 2.8 liters  Hgb 8.8  INR 1.5, aPTT 33  Cr 1.67 on CRRT  TB 13.2 from 13.6 from 15, peak 15.3  Lactic 2.7 from 2.0 from 1.7  Procalc 3.7 from 2.33 from 2.61 from 2.66 from 3.14 from 3.55 from 4.24    CXR (3/3/23) mild pulmonary edema  CXR (3/2/23) Probable increased pulmonary edema. Increased pleural effusions and bibasilar  airspace opacities.     Head CTA (3/2/23) Occlusion distal right anterior cerebral artery, with associated small area of matched perfusion defect and cortical enhancement, consistent with subacute infarct. Diminutive and irregular right vertebral artery, occluded at the V3-4 junction, age indeterminate extensive multifocal atherosclerosis in the intracranial and extracranial circulation. IMPRESSION:  Acute on chronic combined systolic/diastolic  heart failure  Stage D, NYHA class IV symptoms   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, LVEF 20% (by echo 1/2023) and 23% (by cMRI 1/3/23)  Cardiac MRI suggestive of ischemic cardiomyopathy  PYP equivocal  S/p HeartMate 3  LVAD-DT (2/15/23)  C/b RV failure requiring Impella RP (2/18/23)  C/b franco on CKD requiring CRRT  C/b liver dysfunction (ischemic vs congestive)  Coronary artery disease  CAD s/p CABG x 2: further disease best managed medically due to small vessel size   At risk of sudden cardiac death  Peripheral arterial disease  Bilateral hydronephrosis s/p donnelly  Cardiac risk factors:  HTN  HL  TRISTAN, STOP-BANG 4  DM2  CKD, stage 3  MOCA from 2/9 19/30, consistent with mild cognitive impairment  Hard of hearing  Gastric Neuroendocrine Tumor, elevated gastrin and chromogranin A  Septic shock, source pending         LIFE GOALS:  Patient's personal goals included: having a few more years with family  Important upcoming milestones or family events: None at this time   The patient identified the following as important for living well: being at home, not being SOB            CARDIAC IMAGING:   Echo 2/19/23    Left Ventricle: Severely reduced left ventricular systolic function with a visually estimated EF of 20 - 25%. Not well visualized. Left ventricle size is normal. Increased wall thickness. Unable to assess wall motion. Abnormal diastolic function. LVAD is present. LVAD inflow cannula was not well visualized. Right Ventricle: Not well visualized. Right ventricle is mildly dilated. Moderately reduced systolic function.  TAPSE is abnormal.    Mitral Valve: Severe annular calcification at the anterior and posterior leaflets of the mitral valve. Mild regurgitation. Left Atrium: Left atrium is dilated. Right Atrium: Right atrium is dilated. Technical qualifiers: Echo study was technically difficult and technically difficult with poor endocardial visualization. Echo 1/27/23    Left Ventricle: EF by visual approximation is 20%. Left ventricle is mildly dilated. Mitral Valve: Moderately thickened leaflet, at the anterior and posterior leaflets. Moderately calcified leaflet. Moderate regurgitation. Left Atrium: Left atrium is moderately dilated. Technical qualifiers: Echo study was technically difficult with poor endocardial visualization. Contrast used: Definity. Limited study, not all structures viewed     Echo 1/9/23   Left Ventricle: Severely reduced left ventricular systolic function with a visually estimated EF of 25 - 30%. Left ventricle size is normal. Mildly increased wall thickness. Echo 12/26/22    Left Ventricle: Severely reduced left ventricular systolic function with a visually estimated EF of 25 - 30%. Left ventricle size is normal. Normal wall thickness. There are regional wall motion abnormalities. Grade II diastolic dysfunction with increased LAP. Right Ventricle: Moderately reduced systolic function. TAPSE is abnormal. TAPSE is 1.1 cm. Aortic Valve: Mild stenosis of the aortic valve. AV peak gradient is 13 mmHg. AV peak velocity is 1.8 m/s. Mitral Valve: Not well visualized. Moderate annular calcification at the posterior leaflet of the mitral valve. Mild to moderate regurgitation. Tricuspid Valve: Mildly elevated RVSP. Left Atrium: Left atrium is moderately dilated. 12/8/22    Left Ventricle: Moderately reduced left ventricular systolic function with a visually estimated EF of 35 - 40%.  Severe hypokinesis of the following segments: mid anteroseptal, apical anterior, apical septal, apical inferior and apical lateral. Severe hypokinesis of the apex.    Mitral Valve: Severely thickened leaflet, at the anterior and posterior leaflets. Severely calcified leaflet, at the anterior and posterior leaflets. Mild annular calcification of the mitral valve. Moderate regurgitation. Left Atrium: Left atrium is mildly dilated. Contrast used: Definity. limited study     EKG 12/22/22 ST, Biatria enlargement, marked ST abnormality     [facility] 12/6/22  1. Normal LVEDP  2. Severe native multivessel coronary artery disease  3. Patent LIMA to LAD and vein graft to distal RCA  4. Recurrent ISR in OM1 stent with now 60 to 70% restenosis  5. Recoil of left main and circumflex stent with now recurrent 40 to 50% stenosis. 6.  Progression of ostial left main disease now to about 60% stenosis  7. Progression of disease in jailed first marginal branch now with diffuse 90% stenosis  8.   High-grade stenosis in the mid to distal right potential femoral artery treated with 6 x 40 mm impact drug-coated balloon angioplasty to reduce the stenosis to less than 40%        HEMODYNAMICS:  [facility] 1/9/23  PA 20/9, RA 3, PCWP 8, CI 1.8     CPEST too ill   6MW 300 feet    LVAD INTERROGATION:  Device interrogated in person  Device function normal, normal flow, no events  LVAD   Pump Speed (RPM): 4600  Pump Flow (LPM): 3.3  MAP: 68  PI (Pulsitility Index): 5.9  Power: 3   Test: Yes  Back Up  at Bedside & Labeled: Yes  Power Module Test: Yes  Driveline Site Care: No  Driveline Dressing: Clean, Dry, and Intact  Testing  Alarms Reviewed: Yes  Back up SC speed: 4600  Back up Low Speed Limit: 4200  Emergency Equipment with Patient?: Yes  Emergency procedures reviewed?: Yes  Drive line site inspected?: No (covered by dressing)  Drive line intergrity inspected?: Yes  Drive line dressing changed?: No    PHYSICAL EXAM:  Visit Vitals  BP (!) 98/55   Pulse 76   Temp 98.2 °F (36.8 °C)   Resp 20   Ht 5' 6\" (1.676 m)   Wt 137 lb 5.6 oz (62.3 kg)   SpO2 100%   BMI 22.17 kg/m²     Physical Exam  Vitals and nursing note reviewed. Constitutional:       Appearance: He is ill-appearing. Interventions: He is sedated and intubated, follows commands, pupils equal  Cardiovascular:      Rate and Rhythm: Normal rate and regular rhythm. Comments: LVAD Humm noted on auscultation   Pulmonary:      Effort: Pulmonary effort is normal. No respiratory distress. He is intubated. Breath sounds: Clear lungs     Comments:   Abdominal:      General: Bowel sounds are decreased. There is no distension. Comments: Driveline exit site with dressing C/D/I   Musculoskeletal:      Right lower le+ pitting edema present. Left lower le+ pitting edema present. Comments: Left sided weakness leg and arm  Skin:     Capillary Refill: Capillary refill takes more than 3 seconds. Coloration: Skin is jaundiced.          REVIEW OF SYSTEMS:  Review of Systems   Unable to perform ROS: Intubated    PAST MEDICAL HISTORY:  Past Medical History:   Diagnosis Date    CAD (coronary artery disease) 11/10/2016    NSTEMI & 2 stents    Deafness 10/28/2012    DM (diabetes mellitus) ([facility] [address]     Elevated cholesterol     Hypertension     NSTEMI (non-ST elevated myocardial infarction) ([facility] [address] 11/10/2016       PAST SURGICAL HISTORY:  Past Surgical History:   Procedure Laterality Date    COLONOSCOPY N/A 2018    COLONOSCOPY performed by Radha Smalls MD at [facility] ENDOSCOPY    COLONOSCOPY N/A 2023    COLONOSCOPY performed by Marlen Garcia MD at [facility] ENDOSCOPY    [address]  2016    2 stents       FAMILY HISTORY:  Family History   Problem Relation Age of Onset    Heart Disease Father     Heart Attack Father     Hypertension Mother     Elevated Lipids Brother     Elevated Lipids Brother     No Known Problems Sister     Elevated Lipids Brother     No Known Problems Son     No Known Problems Daughter     Anesth Problems Neg Hx        SOCIAL HISTORY:  Social History     Socioeconomic History    Marital status:    Tobacco Use    Smoking status: Never     Passive exposure: Never    Smokeless tobacco: Never   Vaping Use    Vaping Use: Never used   Substance and Sexual Activity    Alcohol use: Yes     Alcohol/week: 2.0 standard drinks     Types: 1 Cans of beer, 1 Shots of liquor per week     Comment: rarely    Drug use: No    Sexual activity: Yes     Social Determinants of Health     Financial Resource Strain: Medium Risk    Difficulty of Paying Living Expenses: Somewhat hard   Food Insecurity: Food Insecurity Present    Worried About Running Out of Food in the Last Year: Never true    Ran Out of Food in the Last Year: Often true       LABORATORY RESULTS:     Labs Latest Ref Rng & Units 3/4/2023 3/3/2023 3/2/2023 3/1/2023 2/28/2023 2/28/2023 2/27/2023   WBC 4.1 - 11.1 K/uL 7.9 8.0 6.7 8.4 - 8.8 -   RBC 4.10 - 5.70 M/uL 2.83(L) 2.66(L) 2.69(L) 2.92(L) - 2.31(L) -   Hemoglobin 12.1 - 17.0 g/dL 8.8(L) 8.2(L) 8. 3(L) 8. 9(L) 8.7(L) 7. 0(L) -   Hematocrit 36.6 - 50.3 % 29. 1(L) 27. 6(L) 26. 5(L) 27. 3(L) 27. 2(L) 22. 8(L) -   MCV 80.0 - 99.0 . 8(H) 103. 8(H) 98.5 93.5 - 98.7 -   Platelets 550 - 941 K/uL 276 230 190 201 - 171 -   Lymphocytes 12 - 49 % 9(L) 9(L) 6(L) 12 - 8(L) -   Monocytes 5 - 13 % 10 10 5 11 - 10 -   Eosinophils 0 - 7 % 6 5 5 6 - 4 -   Basophils 0 - 1 % 1 1 1 1 - 1 -   Albumin 3.5 - 5.0 g/dL 4.1 3.9 4.0 3.7 - 3.6 -   Calcium 8.5 - 10.1 MG/DL 12. 1(H) 11. 5(H) 10. 8(H) 10. 8(H) 10. 3(H) 9.7 9.3   Glucose 65 - 100 mg/dL 199(H) 206(H) 189(H) 133(H) 122(H) 174(H) 105(H)   BUN 6 - 20 MG/DL 24(H) 26(H) 25(H) 19 21(H) 25(H) 23(H)   Creatinine 0.70 - 1.30 MG/DL 1.67(H) 1.67(H) 1.62(H) 1.59(H) 1.61(H) 1.64(H) 1.51(H)   Sodium 136 - 145 mmol/L 138 137 135(L) 137 138 136 138   Potassium 3.5 - 5.1 mmol/L 4.5 4.3 4.4 3.2(L) 3.6 4.0 4.0   TSH 0.36 - 3.74 uIU/mL - - - - - - -   PSA 0.01 - 4.0 ng/mL - - - - - - -   LDH 85 - 241 U/L 334(H) 355(H) 419(H) 612(H) - 582(H) -   Some recent data might be hidden     Lab Results   Component Value Date/Time    TSH 3.52 01/28/2023 05:26 AM    TSH 2.12 12/27/2022 02:36 PM    TSH 4.80 (H) 12/06/2022 03:53 AM    TSH 5.39 (H) 10/12/2022 09:10 AM    TSH 3.53 02/03/2022 11:47 AM    TSH 5.790 (H) 11/21/2019 04:45 PM    TSH 3.08 06/22/2018 01:53 PM    TSH 4.250 05/26/2015 09:43 AM       ALLERGY:  Allergies   Allergen Reactions    Ambien [Zolpidem] Other (comments)     Causes extreme confusion        CURRENT MEDICATIONS:    Current Facility-Administered Medications:     Vancomycin trough - due 3/4 prior to the 1500 dose.   Thanks!, , Other, ONCE, Vidya MOLINA MD    niCARdipine (CARDENE) 25 mg in 0.9% sodium chloride 250 mL ([id number]), 0-15 mg/hr, IntraVENous, TITRATE, Lorena Quevedo DO, Stopped at 03/03/23 1936    heparin 25,000 units in D5W 250 ml infusion, 12-25 Units/kg/hr, IntraVENous, TITRATE, Armando Hunter MD, Stopped at 03/02/23 1400    aspirin chewable tablet 81 mg, 81 mg, Per NG tube, DAILY, Armando Hunter MD, 81 mg at 03/04/23 0840    levETIRAcetam (KEPPRA) injection 500 mg, 500 mg, IntraVENous, Q12H, Yancy Zelaya ARNP, 500 mg at 03/04/23 0215    bicarbonate dialysis (PRISMASOL) BG K 4/Ca 2.5 5000 ml solution, , Extracorporeal, DIALYSIS CONTINUOUS, Abou-Assmandeep, Shmuel Mccartney MD, Last Rate: 1,750 mL/hr at 03/04/23 0754, New Bag at 03/04/23 0754    insulin NPH (NOVOLIN N, HUMULIN N) injection 16 Units, 16 Units, SubCUTAneous, Q12H, Cathy Sheldon CNS, 16 Units at 03/04/23 0840    insulin lispro (HUMALOG) injection, , SubCUTAneous, Q4H, Sheldon, Cathy, CNS, 2 Units at 03/04/23 0840    epoetin heidy-epbx (RETACRIT) injection 10,000 Units, 10,000 Units, SubCUTAneous, Q TUE, THU & SAT, Abou-Assi, Shmuel Mccartney MD, 10,000 Units at 03/02/23 2053    EPINEPHrine (ADRENALIN) 10 mg in 0.9% sodium chloride 250 mL infusion, 0-10 mcg/min, IntraVENous, TITRATE, Tolu Johnson MD, Last Rate: 1.5 mL/hr at 03/04/23 0751, 1 mcg/min at 03/04/23 0751    amiodarone (CORDARONE) 900 mg/250 ml D5W infusion, 0.5-1 mg/min, IntraVENous, TITRATE, Jasmine Johnson MD    vasopressin (VASOSTRICT) 20 Units in 0.9% sodium chloride 100 mL infusion, 0-0.04 Units/min, IntraVENous, TITRATE, Jasmine Johnson MD    PHENYLephrine (JONELLE-SYNEPHRINE) 100 mg in 0.9% sodium chloride 250 mL infusion,  mcg/min, IntraVENous, TITRATE, Jasmine Johnson MD    propofol (DIPRIVAN) 10 mg/mL infusion, 0-50 mcg/kg/min, IntraVENous, TITRATE, Usha Garza MD, Stopped at 03/02/23 1628    HYDROmorphone 30 mg/30 mL (1 mg/mL) infusion, 0.5-1 mg/hr, IntraVENous, CONTINUOUS, Jasmine Johnson MD, Last Rate: 0.5 mL/hr at 03/04/23 0752, 0.5 mg/hr at 03/04/23 0752    dextrose (D50W) injection syrg 25 g, 25 g, IntraVENous, PRN, General Pily MD, 9 mL at 02/25/23 1225    neomycin-polymyxin-dexamethasone (Conor Flack) 3.5 mg/g-10,000 unit/g-0.1 % ophthalmic ointment, , Both Eyes, BID, Sarkis Sesay MD, Given at 03/04/23 7547    NOREPINephrine (LEVOPHED) 8 mg in 5% dextrose 250mL (32 mcg/mL) infusion, 0.5-16 mcg/min, IntraVENous, TITRATE, Jasmine Johnson MD, Stopped at 03/03/23 2043    colchicine tablet 0.6 mg, 0.6 mg, Oral, DAILY, Shaila, Lizette B, NP, 0.6 mg at 03/04/23 0840    fluconazole (DIFLUCAN) 400mg/200 mL IVPB (premix), 400 mg, IntraVENous, Q24H, Shaila Lizette B, NP, Last Rate: 100 mL/hr at 03/03/23 1048, 400 mg at 03/03/23 1048    vancomycin (VANCOCIN) 750 mg in 0.9% sodium chloride 250 mL ([id number]), 750 mg, IntraVENous, Q24H, Walker Aponte MD, Last Rate: 250 mL/hr at 03/03/23 1500, 750 mg at 03/03/23 1500    piperacillin-tazobactam (ZOSYN) 3.375 g in 0.9% sodium chloride (MBP/ADV) 100 mL MBP, 3.375 g, IntraVENous, Q8H, Walker Aponte MD, Last Rate: 25 mL/hr at 03/04/23 0543, 3.375 g at 03/04/23 0543    Vancomycin - pharmacy to dose, , Other, Rx Dosing/Monitoring, Walker Aponte MD    metroNIDAZOLE (FLAGYL) IVPB premix 500 mg, 500 mg, IntraVENous, Q12H, Beto MOLINA MD, Last Rate: 100 mL/hr at 03/04/23 0843, 500 mg at 03/04/23 0843    heparin (porcine) 1,000 unit/mL injection 1,700 Units, 1,700 Units, InterCATHeter, DIALYSIS PRN, 1,700 Units at 03/02/23 0736 **AND** heparin (porcine) 1,000 unit/mL injection 1,500 Units, 1,500 Units, InterCATHeter, DIALYSIS PRN, Marianne Kaur DO, 1,500 Units at 03/02/23 0735    balsam peru-castor oiL (VENELEX) ointment, , Topical, BID, Klever Willis MD, Given at 03/04/23 9481    acetaminophen (TYLENOL) solution 650 mg, 650 mg, Oral, Q4H PRN, Cori Royal MD, 650 mg at 02/20/23 0401    acetaminophen (TYLENOL) suppository 650 mg, 650 mg, Rectal, Q4H PRN, Cori Royal MD, 650 mg at 02/17/23 2013    HYDROmorphone (DILAUDID) injection 0.5 mg, 0.5 mg, IntraVENous, Q3H PRN, Beto MOLINA MD, 0.5 mg at 02/22/23 2225    HYDROmorphone (DILAUDID) injection 1 mg, 1 mg, IntraVENous, Q4H PRN, Beto MOLINA MD, 1 mg at 02/28/23 0557    [Held by provider] heparin 25,000 units in D5W 250 ml infusion, 12-25 Units/kg/hr, IntraVENous, TITRATE, Margaret Ng MD, Last Rate: 8.8 mL/hr at 03/02/23 0826, 13 Units/kg/hr at 03/02/23 0826    albumin human 25% (BUMINATE) solution 12.5 g, 12.5 g, IntraVENous, Q6H, Margaret Ng MD, Last Rate: 60 mL/hr at 02/27/23 0020, 12.5 g at 03/04/23 0543    bumetanide (BUMEX) injection 0.5 mg, 0.5 mg, IntraVENous, Q4H PRN, Margaret Ng MD, 0.5 mg at 02/17/23 1431    glucose chewable tablet 16 g, 4 Tablet, Oral, PRN, Shaila, Lizette B, NP    glucagon (GLUCAGEN) injection 1 mg, 1 mg, IntraMUSCular, PRN, Shaila, Lizette B, NP    sodium chloride (NS) flush 5-40 mL, 5-40 mL, IntraVENous, Q8H, Shaila, Lizette B, NP, 10 mL at 03/04/23 0544    sodium chloride (NS) flush 5-40 mL, 5-40 mL, IntraVENous, PRN, Shaila, Lizette B, NP, 40 mL at 02/17/23 1818    naloxone (NARCAN) injection 0.4 mg, 0.4 mg, IntraVENous, PRN, Shaila, Lizette B, NP    ELECTROLYTE REPLACEMENT NOTE: Nurse to review Serum Potassium and Magnesuim levels and Initiate Electrolyte Replacement Protocol as needed, 1 Each, Other, PRN, Shaila, Lizette B, NP    dextrose 10 % infusion 0-250 mL, 0-250 mL, IntraVENous, PRN, Shaila, Lizette B, NP, Last Rate: 150 mL/hr at 02/24/23 0220, 75 mL at 02/24/23 0220    acetaminophen (TYLENOL) tablet 650 mg, 650 mg, Oral, Q6H PRN, Shaila, Lizette B, NP, 650 mg at 02/17/23 0200    ondansetron (ZOFRAN) injection 4 mg, 4 mg, IntraVENous, Q4H PRN, Shaila, Lizette B, NP    albuterol-ipratropium (DUO-NEB) 2.5 MG-0.5 MG/3 ML, 3 mL, Nebulization, Q6H PRN, Shaila, Lizette B, NP    senna-docusate (PERICOLACE) 8.6-50 mg per tablet 1 Tablet, 1 Tablet, Oral, DAILY, Shaila, Lizette B, NP, 1 Tablet at 03/03/23 0856    polyethylene glycol (MIRALAX) packet 17 g, 17 g, Oral, DAILY, Shaila, Lizette B, NP, 17 g at 03/03/23 0856    bisacodyL (DULCOLAX) suppository 10 mg, 10 mg, Rectal, DAILY PRN, Shaila, Lizette B, NP, 10 mg at 02/22/23 0839    0.45% sodium chloride infusion, 10 mL/hr, IntraVENous, CONTINUOUS, Saray Pickett MD, Last Rate: 10 mL/hr at 03/03/23 2006, 10 mL/hr at 03/03/23 2006    DOBUTamine (DOBUTREX) 500 mg/250 mL (2,000 mcg/mL) infusion, 0-10 mcg/kg/min, IntraVENous, TITRATE, Bradley Johnson MD, Stopped at 02/23/23 1044    dexmedeTOMidine in 0.9 % NaCl (PRECEDEX) 400 mcg/100 mL (4 mcg/mL) infusion soln, 0.1-1.5 mcg/kg/hr, IntraVENous, TITRATE, Bradley Johnson MD, Last Rate: 14.1 mL/hr at 03/04/23 0913, 1 mcg/kg/hr at 03/04/23 0913    0.9% sodium chloride infusion, 9 mL/hr, IntraVENous, CONTINUOUS, Ryan Otero MD, Last Rate: 9 mL/hr at 03/04/23 0751, 9 mL/hr at 03/04/23 0751    PATIENT CARE TEAM:  Patient Care Team:  Wyatt Camejo MD as PCP - General (Family Medicine)  Wyatt Camejo MD as PCP - [address] [place] [address] Provider  Yaquelin Cavanaugh MD (Cardiovascular Disease Physician)  Chris Robertson MD (Gastroenterology)  Ryan Otero MD (Cardiothoracic Surgery)  Aspen Mosley MD (Cardiovascular Disease Physician)  Iva Bañuelos MD (Nephrology)  Ting Carter MD (Pulmonary Disease)  Reuben Bethea MD ([address] [place] [address] Vascular Surgery)     Thank you for allowing me to participate in this patient's care.     Brock Beckwith MD   [address] [place] [address], [address]  Phone: [phone]    Critically ill  Critical care 45 min

## 2023-03-04 NOTE — PROGRESS NOTES
2000 - Bedside and Verbal shift change report given to Vidhi Benson RN (oncoming nurse) by Kade Ding (offgoing nurse). Report included the following information SBAR, Kardex, OR Summary, Procedure Summary, Intake/Output, MAR, Recent Results, and Cardiac Rhythm 1st degree AV block . Uneventful shift. Did not pull a factor on CRRT overnight. Off levo and cardene. No acute neuro changes overnight. 0800 - Bedside and Verbal shift change report given to LORI Kelly (oncoming nurse) by Vidhi Benson RN (offgoing nurse). Report included the following information SBAR, Kardex, OR Summary, Procedure Summary, Intake/Output, MAR, Recent Results, and Cardiac Rhythm 1st degree AV block .

## 2023-03-04 NOTE — PROGRESS NOTES
Pharmacist Note - Vancomycin Dosing  Therapy day 13  Indication  sepsis / VAP  Current regimen: 750 mg IV q24h    Recent Labs     03/04/23  0402 03/03/23  0403 03/02/23  0346   WBC 7.9 8.0 6.7   CREA 1.67* 1.67* 1.62*   BUN 24* 26* 25*       A random vancomycin level of 15.7 mcg/mL was obtained. Goal target range Trough 10-15 mcg/mL      Plan: Continue current regimen. Pharmacy will continue to monitor this patient daily for changes in clinical status and renal function.

## 2023-03-04 NOTE — PROGRESS NOTES
RENAL  PROGRESS NOTE        Subjective:     Remains Intubated. On CRRT. Objective:   VITALS SIGNS:    Visit Vitals  BP (!) 98/55   Pulse 79   Temp 98.3 °F (36.8 °C)   Resp 20   Ht 5' 6\" (1.676 m)   Wt 62.3 kg (137 lb 5.6 oz)   SpO2 100%   BMI 22.17 kg/m²       O2 Device: Endotracheal tube   O2 Flow Rate (L/min): 20 l/min   Temp (24hrs), Av.4 °F (36.9 °C), Min:98.2 °F (36.8 °C), Max:98.9 °F (37.2 °C)         PHYSICAL EXAM:  Intubated  +ETT  + LE edema      DATA REVIEW:     INTAKE / OUTPUT:   Last shift:      701 - 1900  In: 1144.6 [I.V.:749.6]  Out: 1096 [Drains:50]  Last 3 shifts: 1901 -  0700  In: 4580.2 [I.V.:2775.2]  Out: 4273.6 [Drains:340]    Intake/Output Summary (Last 24 hours) at 3/4/2023 1427  Last data filed at 3/4/2023 1400  Gross per 24 hour   Intake 3240.42 ml   Output 2922.8 ml   Net 317.62 ml           LABS:   Recent Labs     23  0402 23  0403 23  0346   WBC 7.9 8.0 6.7   HGB 8.8* 8.2* 8.3*   HCT 29.1* 27.6* 26.5*    230 190       Recent Labs     23  0402 23  0403 23  0346    137 135*   K 4.5 4.3 4.4    105 104   CO2 24 23 22   * 206* 189*   BUN 24* 26* 25*   CREA 1.67* 1.67* 1.62*   CA 12.1* 11.5* 10.8*   MG 3.1* 3.0* 2.7*   PHOS 3.2 3.6 3.9   ALB 4.1 3.9 4.0   TBILI 13.2* 12.7* 13.6*   ALT 78 74 50   INR 1.5* 1.6* 1.7*           Assessment:  TANNER on CKD-3a: Suspect cardiorenal effects with needed diuresis. Now has LVAD and  POST OP ATN. Anuric->Requiring CRRT   hypercalcemia/immobilisation  Ischemic CM: Recurrent exacerbations. EF 20%. S/p LVAD on 2/15. Required Impella RP for RV support-> attempts to wean not tolerated by patient. CVA  Sepsis: Urosepsis-> growing candida    BPH     Well differentiated NET Grade 1: noted on EGD 23     Anemia 2 to CKD:  s/p PRBCS      Left UE basilic and radial vein thrombus    Thrombocytopenia       Plan/Recommendations:  Seen on CVVH .    Qd 1500 cc/hr  Factor 25   4 K Prismasol bags     Daily CRRT labs    BRIGHT  PRBC as per cardiac team  Might use biphosphonate if calcium not better       Shilpa medina MD  [facility]

## 2023-03-05 NOTE — DIALYSIS
CRRT / [phone]    Orders   Mode: CVVHD   Blood Flow Rate: 200 ml/min   Prismasol Dose: 1500 ml/hr   Prismasol Concentrate: 4 K 2.5 Ca   Blood Warmer Temp: 37   Net Fluid Removal: 0     Metrics   BP: 101/63   HR: 78   Access Pressure: -43   Filter Pressure: 132   Return Pressure: 47   TMP: 11   Pressure Drop: 45     Access   Type & Location: LIJ CVC temporary non-tunneled CVC, dressing C/D/I with bio-patch dated 3/3/2023. No signs of redness, drainage, or infection visualized. Each catheter limb disinfected for 60 seconds per limb with alcohol swabs. Caps removed, dialysis CVC hub scrubbed with Prevantics for 15 seconds, followed by a 5 second dry time per Hospital P&P. +asp/+flush x 2 ports. Comments:                                        Labs   HBsAg (Antigen) / date: Negative 2/18/2023                                              HBsAb (Antibody) / date: Susceptible 2/18/2023   Source: Epic     Safety:   Time Out Done:   (Time) 0440   Consent obtained/signed: Verified   Education: Access/Site Care   Primary Nurse Rpt Pre: Tamar Castillo RN   Primary Nurse Rpt Post: Tamar Castillo RN     Comments / Plan:      RN at bedside to change filter d/t visible clotting in the filter/filter near 72 hours of use. Patient, code status, labs, and orders verified. Consents on chart. Time out completed. All possible blood returned to the patient. Old set discarded in red biohazard bin. HF-1000 filter set-up, primed w/ 1L NS, tested and running well. Lines visible and connections secure with blood warmer to return line at 37*C. Education, pre and post to primary RN.

## 2023-03-05 NOTE — PROGRESS NOTES
0745: Bedside and Verbal shift change report received from [address] [place] [address] to [employer]. Report included the following information SBAR, Kardex, Intake/Output, MAR, Recent Results, Med Rec Status, Cardiac Rhythm 1st degree AVB, and Alarm Parameters . 0900: Ratna Avila MD at bedside, plan for NS bolus and 25% Albumin as needed to achieve CVP goal 10, Once CVP 10, wean epi off. If MAP's drop- will come to bedside to increase LVAD speeds. Additionally plan for ECHO today, and full body CT scan. Plan for trach 3/6/23    0944: SBP 110s, Epi weaned. NS bolus initiated. 1150: In total to achieve CVP 10, 250 NS bolus, and 25% Albumin given x 2 doses. 1200: CRRT rinsed back. 1230: Pt down for CT head/ Ct ABD Pelvis. Low flow x 2 when moving over to table. Cardene IV required during scan only. 1300: Moises Patiño MD at bedside, plan to keep off CRRT until tomorrow 3/6    1530: Attempt SBT, low TV 74-90 - RT resumed previous vent settings. 1945: Bedside and Verbal shift change report given to [address] (oncoming nurse) by [employer] (offgoing nurse). Report included the following information SBAR, Kardex, Intake/Output, MAR, Recent Results, Med Rec Status, Cardiac Rhythm NSR, 1st degree AVB, and Alarm Parameters .

## 2023-03-05 NOTE — PROGRESS NOTES
2000 - Bedside and Verbal shift change report given to Joan Joseph RN (oncoming nurse) by Maci Marrero (offgoing nurse). Report included the following information SBAR, Kardex, OR Summary, Procedure Summary, Intake/Output, MAR, Recent Results, and Cardiac Rhythm 1st degree block . 1020 - Pt hypertensive; notified HF. Per Dr. Ward Obrien, turn off epi. PRN order for hydralazine placed.

## 2023-03-05 NOTE — PROGRESS NOTES
RENAL  PROGRESS NOTE        Subjective:     Remains Intubated. Off CRRT. Lower CVP  Objective:   VITALS SIGNS:    Visit Vitals  /64   Pulse 83   Temp 97.9 °F (36.6 °C)   Resp 13   Ht 5' 6\" (1.676 m)   Wt 62.9 kg (138 lb 10.7 oz)   SpO2 99%   BMI 22.38 kg/m²       O2 Device: Endotracheal tube, Ventilator   O2 Flow Rate (L/min): 20 l/min   Temp (24hrs), Av.7 °F (37.1 °C), Min:97.9 °F (36.6 °C), Max:99.4 °F (37.4 °C)         PHYSICAL EXAM:  Intubated  +ETT  + LE edema      DATA REVIEW:     INTAKE / OUTPUT:   Last shift:       07 - 1900  In: 1461.2 [I.V.:1062.7]  Out: 241.3 [Drains:5]  Last 3 shifts: 1901 -  0700  In: 4509.7 [I.V.:2739.2]  Out: 3762.7 [Drains:170]    Intake/Output Summary (Last 24 hours) at 3/5/2023 1421  Last data filed at 3/5/2023 1400  Gross per 24 hour   Intake 3445.03 ml   Output 1717.9 ml   Net 1727.13 ml           LABS:   Recent Labs     23  0509 23  0402 23  0403   WBC 7.0 7.9 8.0   HGB 8.6* 8.8* 8.2*   HCT 29.2* 29.1* 27.6*    276 230       Recent Labs     23  0509 23  0402 23  0403   * 138 137   K 4.4 4.5 4.3    106 105   CO2 24 24 23   * 199* 206*   BUN 23* 24* 26*   CREA 1.64* 1.67* 1.67*   CA 12.4* 12.1* 11.5*   MG 2.8* 3.1* 3.0*   PHOS 3.4 3.2 3.6   ALB 3.7 4.1 3.9   TBILI 12.0* 13.2* 12.7*   ALT 81* 78 74   INR 1.5* 1.5* 1.6*           Assessment:  TANNER on CKD-3a: Suspect cardiorenal effects with needed diuresis. Now has LVAD and  POST OP ATN. Anuric->Requiring CRRT   hypercalcemia/immobilisation  Ischemic CM: Recurrent exacerbations. EF 20%. S/p LVAD on 2/15. Required Impella RP for RV support-> attempts to wean not tolerated by patient.    CVA  Sepsis: Urosepsis-> growing candida  BPH   Well differentiated NET Grade 1: noted on EGD 23  Anemia 2 to CKD:  s/p PRBCS    Left UE basilic and radial vein thrombus  Thrombocytopenia       Plan/Recommendations:   Off  CVVH as of today Reassess in AM if CVVH vs iHD  Fluid mgt as per CHF team   BRIGHT  PRBC as per cardiac team  Might use biphosphonate if calcium not better  S/p pan CTscan today  Discussed with RNs         [facility] ivonne medina MD  [address] [facility]

## 2023-03-05 NOTE — PROGRESS NOTES
CRITICAL CARE NOTE      Name: Sandra Kraus   : 1950   MRN: [id number]   Date: 3/5/2023      Reason for ICU Admission: Acute on chronic HFrEF     ICU PROBLEM LIST   Acute on chronic HFrEF (20%) NYHA IIIb/IV (D) on home inotropic support, life vest s/p LVAD  TANNER on CKD3, on CRRT  CHB post op, resolved  Aflutter w/ RVR  Acute on chronic hypoxemic respiratory failure  CAP  CAD  Gastric neuroendocrine tumor  Hx of LUE DVT  DM  PAD  TRISTAN  BPH w/ urinary retention requiring chronic donnelly    HISTORY OF PRESENT ILLNESS:   Pt is a 71 y.o M w/ PMH as noted above admitted to [facility] on  due to ongoing symptoms secondary to his HFrEF. Treated for possible CAP, and diuresed for acute on chronic HFrEF. Nephrology following for TANNER on CKD 3. AHF team recommended transfer to CVICU for [facility] placement and ongoing LVAD workup, with placement 2/15. Pt extubated , however with development of worsening renal dysfunction overnight and unable to tolerate CRRT so was reintubated and taken for Impella RP placement for right-sided support in a.m. of  and CRRT resumed. Impella removed on 3/1. Overnight 3/1-3/1 CVA noted with SAH.  3/2 SAH enlarged. Held anticoagulation. Failed extubation 3/2    24 HOUR EVENTS:   No acute overnight events, remains intubated, sedated. On minimal dose epi this a.m. CRRT stopped per discussion with nephrology. Spoke to family over phone extensively regarding current plan of care and need for tracheostomy placement given multiple failed extubations. Family will discuss amongst selves regarding whether this is something that Mr. Kendall would desire.     NEUROLOGICAL:    Precedex, Dilaudid gtt  Goal RASS 0 to -1  Repeat CT today, follow results  RASHAD  Delirium precautions    PULMONOLOGY:   Lung protective ventilation, on minimal vent support at this time  Trach discussion as per above  Monitor blood gas    CARDIOVASCULAR:   On dose epi, wean to off as tolerated  Aneesh echo in a.m.  NICOM monitoring  LVAD  3L @ 4700 RPM  CVP goal 10-12  C/w ASA, statin  Goal euvolemia  Unable to tolerate BB, ARNI/ARB due to hypotension, aldactone held 2/2 elevated K     GASTROINTESTINAL:   NPO, on tube feeds  PPI   Trend LFTs  Tbili downtrend at 12, however remains elevated  ? Pancreatitis, CT to evaluate    RENAL/ELECTROLYTE/FLUIDS:   Strict I/Os,  goal  euvolemia  Stop CRRT, assess iHD vs CRRT in AM  Monitor for renal recovery  Nephrology following  Monitor RFP  Mg/Phos/K >2/3/4  Vasquez to remain in place    ENDOCRINE:    SSI, Basal insulin   Hypoglycemic protocol  Glucose goal 120-180    HEMATOLOGY/ONCOLOGY:   AC held due to [facility], discuss restarting per CT  Hemoglobin goal greater than 7, platelet goal greater than 50  EPO weekly  Gastric neuroendocrine tumor, well differentiated, good prognosis per onc.      ID/MICRO:   ID consulted  Bcx without growth at this time  Urine with Candida   On VANC, Zosyn, Flagyl, fluconazole    Unclear if Pro-Romain trend is helpful at this point given patient on CRRT, LVAD, and SAH as all of these can increase or cause false positive inflammatory markers    Pan CT today to evaluate for infectious nidus/sources    ICU DAILY CHECKLIST     Code Status:Full  DVT Prophylaxis:heparin  T/L/D: ETT, Impella PIV,  Vasquez,  V  wire,  valentin  x5, LVAD drive line  SUP: PPI  Diet: NPO  Activity Level:ad jose f  ABCDEF Bundle/Checklist Completed:Yes  Disposition: Stay in ICU  Multidisciplinary Rounds Completed:  yes  Patient/Family Updated: Yes    Tatiana   2/3 Transferred to CVICU for [facility] placement  2/7 started on dobutamine as adjunct to milrinone  2/15 LVAD implant  2/16 extubated  2/18 Impella RP placement  3/1 Impella removed  3/1-2 SAH on CT  3/3 RE-intubated    SUBJECTIVE:     As noted above    Review of Systems:     Unable to perform due to patient intubated    OBJECTIVE:     Labs and Data: Reviewed 03/05/23  Medications: Reviewed 03/05/23  Imaging: Reviewed 23    General - sedated, intubated chronically ill appearing  HEENT- pupils equal, nystagmus, anicteric  Neuro - sedated, no focal deficit  CV - Paced,  VAD hum, post sternotomy, valentin  x5  Resp - rales b/l, NC  Abd - soft, distended, nontender, no guarding  MSK- intact, no sacral ulcer  LVAD driveline in left chest    Visit Vitals  /64   Pulse 83   Temp 97.9 °F (36.6 °C)   Resp 13   Ht 5' 6\" (1.676 m)   Wt 62.9 kg (138 lb 10.7 oz)   SpO2 99%   BMI 22.38 kg/m²    O2 Flow Rate (L/min): 20 l/min O2 Device: Endotracheal tube, Ventilator Temp (24hrs), Av.7 °F (37.1 °C), Min:97.9 °F (36.6 °C), Max:99.4 °F (37.4 °C)    CVP (mmHg): 11 mmHg (23 1400)      Intake/Output:     Intake/Output Summary (Last 24 hours) at 3/5/2023 1507  Last data filed at 3/5/2023 1400  Gross per 24 hour   Intake 3271.23 ml   Output 1564 ml   Net 1707.23 ml       Imaging    23    ECHO ADULT FOLLOW-UP OR LIMITED 2023    Interpretation Summary    Left Ventricle: EF by visual approximation is 20%. Left ventricle is mildly dilated. Mitral Valve: Moderately thickened leaflet, at the anterior and posterior leaflets. Moderately calcified leaflet. Moderate regurgitation. Left Atrium: Left atrium is moderately dilated. Technical qualifiers: Echo study was technically difficult with poor endocardial visualization. Contrast used: Definity. Limited study, not all structures viewed    Signed by: Arabella Lee MD on 2023  4:39 PM       Pertinent imaging reviewed and interpreted as noted above    CRITICAL CARE DOCUMENTATION  I had a face to face encounter with the patient, reviewed and interpreted patient data including clinical events, labs, images, vital signs, I/O's, and examined patient.   I have discussed the case and the plan and management of the patient's care with the consulting services, the bedside nurses and the respiratory therapist.      NOTE OF PERSONAL INVOLVEMENT IN CARE   This patient has a high probability of imminent, clinically significant deterioration, which requires the highest level of preparedness to intervene urgently. I participated in the decision-making and personally managed or directed the management of the following life and organ supporting interventions that required my frequent assessment to treat or prevent imminent deterioration. I personally spent 35 minutes of critical care time. This is time spent at this critically ill patient's bedside actively involved in patient care as well as the coordination of care. This does not include any procedural time which has been billed separately. Walker Gao MD  Staff [address] [facility]

## 2023-03-05 NOTE — PROGRESS NOTES
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 Fatou Rosario  C/b RV failure requiring Impella RP placed 23 and discontinued 3/1/23  C/b acute on chronic renal failure, requiring CRRT  C/b hepatic failure, TB 12 (peak 15.3)  C/b lactic acidosis, persistent  C/b persistent septic shock c/b vasodilation and high outputs, on multiple antibiotics, procalcitonin persistently elevated  C/b R SUPA occlusion with small area of matched perfusion defect - subacute infarct c/b left sided hemiparesis  C/b pancreatitis  C/b failure to extubate x 2; anticipating tracheostomy next week  Patient was approved for LVAD implantation as destination therapy at [facility] 2/3/23; the following have been identified and d/w patient as relative concerns pertaining to LVAD candidacy: small body surface area, cachexia, BMI 18, malnutrition, frailty, advanced age, muscular deconditioning, PVD (fingertips), urinary retention requiring donnelly catheter, neuroendocrine tumor class 1 requiring treatment after LVAD, mild neurocognitive dysfunction, chronic kidney disease and hearing loss requiring hearing aid  Family updated on Friday  Plan to re-convene between services CTS/AHF/Intensivists on Monday AM to discuss timing of tracheostomy based on data obtained from today's CT c/a/p, head CT and echo        RECOMMENDATIONS:  Continue LVAD at 4600rpm; LVEDD 4.8cm - increase speed when MAP improved  Wean epi to off; CI at goal by NICOM  Continue cardene gtt until after trach for goal SBP < 110mmHg and/or MAP < 90mmHg  Start scheduled antihypertensive regimen after tracheostomy and wean cardene  Check echocardiogram and EKG today  Continuous NICOMs; change patches every other day  No beta-blockers due to hypotension and RV conditioning prior to LVAD  Cannot tolerate ARB/ARNi due to hypotension and upcoming surgical procedure   Cannot tolerate spironolactone due to hyperkalemia  Cannot tolerate jardiance due to significant diuresis on smallest dose/contributed to IVVD  Previously discontinued corlanor due to SVT  Digoxin stopped d/t risk for toxicity with amio loading  Discontinued amio due to intermitted heart blocks under pacemaker  Timing to switch to intermittent dialysis per nephrology; goal CVP 10-12  Keep K+ >4 and Mg >2  Transfuse one unit pRBC for hgb > 7 prn  Continue antibiotics, zosyn, vanc, flagyl and diflucan   Spoke to ID to r/o abscess; will proceed with CT c/a/p with contrast   Continue colchicine 0.6mg daily for possible pericarditis  Hold ASA d/t CVA   Continue to hold coumadin and heparin gtt  Cannot tolerate statins due to abnormal LFT  Timing of chest tube removal per Dr. Denia Parrish  Management of tube feeds in light of pancreatitis per intensivist  Continue bowel regimen   Daily dressing changes to Drive line exit site  Pulmonary hygiene   Timing of tracheostomy per CTS and intensivist  Neurology consultation appreciated, off anticoagulation and head CT today  VAD education with caregivers/patient when able by VAD coordinator  D/w Dr. Skinner Ar and bedside staff      INTERVAL HISTORY:  No events overnight  Moves x 4; left sided weakness  SBT ongoing  Afebrile  MAPs 70-80s, HR 80-90s  CVP 6  Epi 1, precedex, cardene gtt  I/O net positive 555cc, dialysis 2.4 liters  Hgb 8.6  INR 1.5, aPTT 33  Cr 1.64 on CRRT  TB 12 from 13.2 from 13.6 from 15, peak 15.3  Lactic 2.3 from 2.7 from 2.0 from 1.7  Lipase 1345  Procalc 2.58 from 3.7      CXR (3/5/23) mild pulmonary edema. Small pleural effusions and bibasilar airspace opacities.      Head CTA (3/2/23) Occlusion distal right anterior cerebral artery, with associated small area of matched perfusion defect and cortical enhancement, consistent with subacute infarct. Diminutive and irregular right vertebral artery, occluded at the V3-4 junction, age indeterminate extensive multifocal atherosclerosis in the intracranial and extracranial circulation.      IMPRESSION:  Acute on chronic combined systolic/diastolic  heart failure  Stage D, NYHA class IV symptoms   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, LVEF 20% (by echo 1/2023) and 23% (by cMRI 1/3/23)  Cardiac MRI suggestive of ischemic cardiomyopathy  PYP equivocal  S/p HeartMate 3  LVAD-DT (2/15/23)  C/b RV failure requiring Impella RP placed 2/18/23 and discontinued 3/1/23  C/b acute on chronic renal failure, requiring CRRT  C/b hepatic failure, TB 12 (peak 15.3)  C/b lactic acidosis, persistent  C/b persistent septic shock c/b vasodilation and high outputs, on multiple antibiotics, procalcitonin persistently elevated  C/b R SUPA occlusion with small area of matched perfusion defect - subacute infarct c/b left sided hemiparesis  C/b pancreatitis  C/b failure to extubate x 2; anticipating tracheostomy next week  Coronary artery disease  CAD s/p CABG x 2: further disease best managed medically due to small vessel size   At risk of sudden cardiac death  Peripheral arterial disease  Bilateral hydronephrosis s/p donnelly  Cardiac risk factors:  HTN  HL  TRISTAN, STOP-BANG 4  DM2  CKD, stage 3  MOCA from 2/9 19/30, consistent with mild cognitive impairment  Hard of hearing  Gastric Neuroendocrine Tumor, elevated gastrin and chromogranin A  Septic shock, source pending         LIFE GOALS:  Patient's personal goals included: having a few more years with family  Important upcoming milestones or family events: None at this time   The patient identified the following as important for living well: being at home, not being SOB            CARDIAC IMAGING:   Echo 2/19/23 Left Ventricle: Severely reduced left ventricular systolic function with a visually estimated EF of 20 - 25%. Not well visualized. Left ventricle size is normal. Increased wall thickness. Unable to assess wall motion. Abnormal diastolic function. LVAD is present. LVAD inflow cannula was not well visualized. Right Ventricle: Not well visualized. Right ventricle is mildly dilated. Moderately reduced systolic function. TAPSE is abnormal.    Mitral Valve: Severe annular calcification at the anterior and posterior leaflets of the mitral valve. Mild regurgitation. Left Atrium: Left atrium is dilated. Right Atrium: Right atrium is dilated. Technical qualifiers: Echo study was technically difficult and technically difficult with poor endocardial visualization. Echo 1/27/23    Left Ventricle: EF by visual approximation is 20%. Left ventricle is mildly dilated. Mitral Valve: Moderately thickened leaflet, at the anterior and posterior leaflets. Moderately calcified leaflet. Moderate regurgitation. Left Atrium: Left atrium is moderately dilated. Technical qualifiers: Echo study was technically difficult with poor endocardial visualization. Contrast used: Definity. Limited study, not all structures viewed     Echo 1/9/23   Left Ventricle: Severely reduced left ventricular systolic function with a visually estimated EF of 25 - 30%. Left ventricle size is normal. Mildly increased wall thickness. Echo 12/26/22    Left Ventricle: Severely reduced left ventricular systolic function with a visually estimated EF of 25 - 30%. Left ventricle size is normal. Normal wall thickness. There are regional wall motion abnormalities. Grade II diastolic dysfunction with increased LAP. Right Ventricle: Moderately reduced systolic function. TAPSE is abnormal. TAPSE is 1.1 cm. Aortic Valve: Mild stenosis of the aortic valve. AV peak gradient is 13 mmHg. AV peak velocity is 1.8 m/s.     Mitral Valve: Not well visualized. Moderate annular calcification at the posterior leaflet of the mitral valve. Mild to moderate regurgitation. Tricuspid Valve: Mildly elevated RVSP. Left Atrium: Left atrium is moderately dilated. 12/8/22    Left Ventricle: Moderately reduced left ventricular systolic function with a visually estimated EF of 35 - 40%. Severe hypokinesis of the following segments: mid anteroseptal, apical anterior, apical septal, apical inferior and apical lateral. Severe hypokinesis of the apex. Mitral Valve: Severely thickened leaflet, at the anterior and posterior leaflets. Severely calcified leaflet, at the anterior and posterior leaflets. Mild annular calcification of the mitral valve. Moderate regurgitation. Left Atrium: Left atrium is mildly dilated. Contrast used: Definity. limited study     EKG 12/22/22 ST, Biatria enlargement, marked ST abnormality     LHC 12/6/22  1. Normal LVEDP  2. Severe native multivessel coronary artery disease  3. Patent LIMA to LAD and vein graft to distal RCA  4. Recurrent ISR in OM1 stent with now 60 to 70% restenosis  5. Recoil of left main and circumflex stent with now recurrent 40 to 50% stenosis. 6.  Progression of ostial left main disease now to about 60% stenosis  7. Progression of disease in jailed first marginal branch now with diffuse 90% stenosis  8.   High-grade stenosis in the mid to distal right potential femoral artery treated with 6 x 40 mm impact drug-coated balloon angioplasty to reduce the stenosis to less than 40%        HEMODYNAMICS:  RHC 1/9/23  PA 20/9, RA 3, PCWP 8, CI 1.8     CPEST too ill   6MW 300 feet    LVAD INTERROGATION:  Device interrogated in person  Device function normal, normal flow, no events  LVAD   Pump Speed (RPM): 4700  Pump Flow (LPM): 30  MAP: 68  PI (Pulsitility Index): 5.3  Power: 3   Test: Yes  Back Up  at Bedside & Labeled: Yes  Power Module Test: Yes  Driveline Site Care: No  Driveline Dressing: Clean, Dry, and Intact  Testing  Alarms Reviewed: Yes  Back up SC speed: 4700  Back up Low Speed Limit: 4200  Emergency Equipment with Patient?: Yes  Emergency procedures reviewed?: Yes  Drive line site inspected?: No (covered by dressing)  Drive line intergrity inspected?: Yes  Drive line dressing changed?: No    PHYSICAL EXAM:  Visit Vitals  /64   Pulse 80   Temp 98.4 °F (36.9 °C)   Resp 20   Ht 5' 6\" (1.676 m)   Wt 138 lb 10.7 oz (62.9 kg)   SpO2 100%   BMI 22.38 kg/m²     Physical Exam  Vitals and nursing note reviewed. Constitutional:       Appearance: He is ill-appearing. Interventions: He is sedated and intubated, follows commands, pupils equal  Cardiovascular:      Rate and Rhythm: Normal rate and regular rhythm. Comments: LVAD Humm noted on auscultation   Pulmonary:      Effort: Pulmonary effort is normal. No respiratory distress. He is intubated. Breath sounds: Clear lungs     Comments:   Abdominal:      General: Bowel sounds are decreased. There is no distension. Comments: Driveline exit site with dressing C/D/I   Musculoskeletal:      Right lower le+ pitting edema present. Left lower le+ pitting edema present. Comments: Left sided weakness leg and arm  Skin:     Capillary Refill: Capillary refill takes more than 3 seconds. Coloration: Skin is jaundiced.          REVIEW OF SYSTEMS:  Review of Systems   Unable to perform ROS: Intubated    PAST MEDICAL HISTORY:  Past Medical History:   Diagnosis Date    CAD (coronary artery disease) 11/10/2016    NSTEMI & 2 stents    Deafness 10/28/2012    DM (diabetes mellitus) ([facility] [address]     Elevated cholesterol     Hypertension     NSTEMI (non-ST elevated myocardial infarction) ([facility] [address] 11/10/2016       PAST SURGICAL HISTORY:  Past Surgical History:   Procedure Laterality Date    COLONOSCOPY N/A 2018    COLONOSCOPY performed by Flakita Tubbs MD at [facility] ENDOSCOPY    COLONOSCOPY N/A 2023 COLONOSCOPY performed by Alodnra Elizalde MD at [facility] ENDOSCOPY    [address]  11/11/2016    2 stents       FAMILY HISTORY:  Family History   Problem Relation Age of Onset    Heart Disease Father     Heart Attack Father     Hypertension Mother     Elevated Lipids Brother     Elevated Lipids Brother     No Known Problems Sister     Elevated Lipids Brother     No Known Problems Son     No Known Problems Daughter     Anesth Problems Neg Hx        SOCIAL HISTORY:  Social History     Socioeconomic History    Marital status:    Tobacco Use    Smoking status: Never     Passive exposure: Never    Smokeless tobacco: Never   Vaping Use    Vaping Use: Never used   Substance and Sexual Activity    Alcohol use: Yes     Alcohol/week: 2.0 standard drinks     Types: 1 Cans of beer, 1 Shots of liquor per week     Comment: rarely    Drug use: No    Sexual activity: Yes     Social Determinants of Health     Financial Resource Strain: Medium Risk    Difficulty of Paying Living Expenses: Somewhat hard   Food Insecurity: Food Insecurity Present    Worried About Running Out of Food in the Last Year: Never true    Ran Out of Food in the Last Year: Often true       LABORATORY RESULTS:     Labs Latest Ref Rng & Units 3/5/2023 3/4/2023 3/3/2023 3/2/2023 3/1/2023 2/28/2023 2/28/2023   WBC 4.1 - 11.1 K/uL 7.0 7.9 8.0 6.7 8.4 - 8.8   RBC 4.10 - 5.70 M/uL 2.77(L) 2.83(L) 2.66(L) 2.69(L) 2.92(L) - 2.31(L)   Hemoglobin 12.1 - 17.0 g/dL 8.6(L) 8.8(L) 8.2(L) 8. 3(L) 8. 9(L) 8.7(L) 7. 0(L)   Hematocrit 36.6 - 50.3 % 29. 2(L) 29. 1(L) 27. 6(L) 26. 5(L) 27. 3(L) 27. 2(L) 22. 8(L)   MCV 80.0 - 99.0 . 4(H) 102. 8(H) 103. 8(H) 98.5 93.5 - 98.7   Platelets 976 - 242 K/uL 243 276 230 190 201 - 171   Lymphocytes 12 - 49 % 11(L) 9(L) 9(L) 6(L) 12 - 8(L)   Monocytes 5 - 13 % 8 10 10 5 11 - 10   Eosinophils 0 - 7 % 8(H) 6 5 5 6 - 4   Basophils 0 - 1 % 1 1 1 1 1 - 1   Albumin 3.5 - 5.0 g/dL 3.7 4.1 3.9 4.0 3.7 - 3.6 Calcium 8.5 - 10.1 MG/DL 12. 4(H) 12. 1(H) 11. 5(H) 10. 8(H) 10. 8(H) 10. 3(H) 9.7   Glucose 65 - 100 mg/dL 201(H) 199(H) 206(H) 189(H) 133(H) 122(H) 174(H)   BUN 6 - 20 MG/DL 23(H) 24(H) 26(H) 25(H) 19 21(H) 25(H)   Creatinine 0.70 - 1.30 MG/DL 1.64(H) 1.67(H) 1.67(H) 1.62(H) 1.59(H) 1.61(H) 1.64(H)   Sodium 136 - 145 mmol/L 135(L) 138 137 135(L) 137 138 136   Potassium 3.5 - 5.1 mmol/L 4.4 4.5 4.3 4.4 3.2(L) 3.6 4.0   TSH 0.36 - 3.74 uIU/mL - - - - - - -   PSA 0.01 - 4.0 ng/mL - - - - - - -   LDH 85 - 241 U/L 285(H) 334(H) 355(H) 419(H) 612(H) - 582(H)   Some recent data might be hidden     Lab Results   Component Value Date/Time    TSH 3.52 01/28/2023 05:26 AM    TSH 2.12 12/27/2022 02:36 PM    TSH 4.80 (H) 12/06/2022 03:53 AM    TSH 5.39 (H) 10/12/2022 09:10 AM    TSH 3.53 02/03/2022 11:47 AM    TSH 5.790 (H) 11/21/2019 04:45 PM    TSH 3.08 06/22/2018 01:53 PM    TSH 4.250 05/26/2015 09:43 AM       ALLERGY:  Allergies   Allergen Reactions    Ambien [Zolpidem] Other (comments)     Causes extreme confusion        CURRENT MEDICATIONS:    Current Facility-Administered Medications:     0.9% sodium chloride infusion 250 mL, 250 mL, IntraVENous, CONTINUOUS, Nehemias Heaton MD, 250 mL at 03/05/23 0926    iopamidoL (ISOVUE-370) 370 mg iodine /mL (76 %) injection 100 mL, 100 mL, IntraVENous, RAD ONCE, Evelyn Norton MD    albumin human 25% (BUMINATE) solution 12.5 g, 12.5 g, IntraVENous, ONCE, Nehemias Heaton MD    hydrALAZINE (APRESOLINE) 20 mg/mL injection 5 mg, 5 mg, IntraVENous, Q6H PRN, Nehemias Heaton MD    hydrALAZINE (APRESOLINE) tablet 5 mg, 5 mg, Oral, PRN, Nehemias Heaton MD    niCARdipine (CARDENE) 25 mg in 0.9% sodium chloride 250 mL ([id number]), 0-15 mg/hr, IntraVENous, TITRATE, Sona Sanders DO, Stopped at 03/03/23 1936    aspirin chewable tablet 81 mg, 81 mg, Per NG tube, DAILY, Elle Turner MD, 81 mg at 03/05/23 0858    levETIRAcetam (KEPPRA) injection 500 mg, 500 mg, IntraVENous, Q12H, Delta Dadds, ARNP, 500 mg at 03/05/23 0216    bicarbonate dialysis (PRISMASOL) BG K 4/Ca 2.5 5000 ml solution, , Extracorporeal, DIALYSIS CONTINUOUS, Ariel Schultz MD, Last Rate: 1,750 mL/hr at 03/05/23 1106, New Bag at 03/05/23 1106    insulin NPH (NOVOLIN N, HUMULIN N) injection 16 Units, 16 Units, SubCUTAneous, Q12H, Cathy Sheldon, SLICK, 16 Units at 03/05/23 0858    insulin lispro (HUMALOG) injection, , SubCUTAneous, Q4H, Cathy Sheldon, CNS, 2 Units at 03/04/23 0840    epoetin heidy-epbx (RETACRIT) injection 10,000 Units, 10,000 Units, SubCUTAneous, Q TUE, THU & SAT, Ariel Schultz MD, 10,000 Units at 03/04/23 2050    EPINEPHrine (ADRENALIN) 10 mg in 0.9% sodium chloride 250 mL infusion, 0-10 mcg/min, IntraVENous, TITRATE, Holland Johnson MD, Stopped at 03/05/23 0920    amiodarone (CORDARONE) 900 mg/250 ml D5W infusion, 0.5-1 mg/min, IntraVENous, TITRATE, Holland Johnson MD    vasopressin (VASOSTRICT) 20 Units in 0.9% sodium chloride 100 mL infusion, 0-0.04 Units/min, IntraVENous, TITRATE, Holland Johnson MD    PHENYLephrine (JONELLE-SYNEPHRINE) 100 mg in 0.9% sodium chloride 250 mL infusion,  mcg/min, IntraVENous, TITRATE, Holland Johnson MD    propofol (DIPRIVAN) 10 mg/mL infusion, 0-50 mcg/kg/min, IntraVENous, TITRATE, Arleth Lucero MD, Stopped at 03/02/23 1628    HYDROmorphone 30 mg/30 mL (1 mg/mL) infusion, 0.5-1 mg/hr, IntraVENous, CONTINUOUS, Holland Johnson MD, Last Rate: 0.5 mL/hr at 03/05/23 0752, 0.5 mg/hr at 03/05/23 0752    dextrose (D50W) injection syrg 25 g, 25 g, IntraVENous, PRN, Linh Esqueda MD, 9 mL at 02/25/23 1225    neomycin-polymyxin-dexamethasone (DEXACINE) 3.5 mg/g-10,000 unit/g-0.1 % ophthalmic ointment, , Both Eyes, BID, Jesus Cole MD, Given at 03/05/23 0859    NOREPINephrine (LEVOPHED) 8 mg in 5% dextrose 250mL (32 mcg/mL) infusion, 0.5-16 mcg/min, IntraVENous, TITRATE, FisHolland terrell MD, Stopped at 03/05/23 0710    colchicine tablet 0.6 mg, 0.6 mg, Oral, DAILY, Lizette Linton, NP, 0.6 mg at 03/05/23 0858    fluconazole (DIFLUCAN) 400mg/200 mL IVPB (premix), 400 mg, IntraVENous, Q24H, Lizette Linton NP, Last Rate: 100 mL/hr at 03/04/23 1045, 400 mg at 03/04/23 1045    vancomycin (VANCOCIN) 750 mg in 0.9% sodium chloride 250 mL ([id number]), 750 mg, IntraVENous, Q24H, Walker Aponte MD, Last Rate: 250 mL/hr at 03/04/23 1413, 750 mg at 03/04/23 1413    piperacillin-tazobactam (ZOSYN) 3.375 g in 0.9% sodium chloride (MBP/ADV) 100 mL MBP, 3.375 g, IntraVENous, Q8H, Walker Aponte MD, Last Rate: 25 mL/hr at 03/05/23 0539, 3.375 g at 03/05/23 0539    Vancomycin - pharmacy to dose, , Other, Rx Dosing/Monitoring, Walker Rose MD    metroNIDAZOLE (FLAGYL) IVPB premix 500 mg, 500 mg, IntraVENous, Q12H, Ramos MOLINA MD, Last Rate: 100 mL/hr at 03/05/23 0900, 500 mg at 03/05/23 0900    heparin (porcine) 1,000 unit/mL injection 1,700 Units, 1,700 Units, InterCATHeter, DIALYSIS PRN, 1,700 Units at 03/02/23 0736 **AND** heparin (porcine) 1,000 unit/mL injection 1,500 Units, 1,500 Units, InterCATHeter, DIALYSIS PRN, Julio Saldana, DO, 1,500 Units at 03/02/23 0735    balsam peru-castor oiL (VENELEX) ointment, , Topical, BID, Cliff Diana MD, Given at 03/05/23 0859    acetaminophen (TYLENOL) solution 650 mg, 650 mg, Oral, Q4H PRN, Clara Carcamo MD, 650 mg at 02/20/23 0401    acetaminophen (TYLENOL) suppository 650 mg, 650 mg, Rectal, Q4H PRN, Clara Carcamo MD, 650 mg at 02/17/23 2013    HYDROmorphone (DILAUDID) injection 0.5 mg, 0.5 mg, IntraVENous, Q3H PRN, Ramos MOLINA MD, 0.5 mg at 02/22/23 2225    HYDROmorphone (DILAUDID) injection 1 mg, 1 mg, IntraVENous, Q4H PRN, Ramos MOLINA MD, 1 mg at 02/28/23 0557    [Held by provider] heparin 25,000 units in D5W 250 ml infusion, 12-25 Units/kg/hr, IntraVENous, TITRATE, Maggie Mueller MD, Last Rate: 8.8 mL/hr at 03/02/23 0826, 13 Units/kg/hr at 03/02/23 0078 albumin human 25% (BUMINATE) solution 12.5 g, 12.5 g, IntraVENous, Q6H, Zia Lee MD, Last Rate: 60 mL/hr at 02/27/23 0020, 12.5 g at 03/05/23 0539    bumetanide (BUMEX) injection 0.5 mg, 0.5 mg, IntraVENous, Q4H PRN, Zia Lee MD, 0.5 mg at 02/17/23 1431    glucose chewable tablet 16 g, 4 Tablet, Oral, PRN, Shaila, Lizette B, NP    glucagon (GLUCAGEN) injection 1 mg, 1 mg, IntraMUSCular, PRN, Shaila, Lizette B, NP    sodium chloride (NS) flush 5-40 mL, 5-40 mL, IntraVENous, Q8H, Shaila, Lizette B, NP, 10 mL at 03/05/23 0540    sodium chloride (NS) flush 5-40 mL, 5-40 mL, IntraVENous, PRN, Shaila, Lizette B, NP, 40 mL at 02/17/23 1818    naloxone (NARCAN) injection 0.4 mg, 0.4 mg, IntraVENous, PRN, Shaila, Lizette B, NP    ELECTROLYTE REPLACEMENT NOTE: Nurse to review Serum Potassium and Magnesuim levels and Initiate Electrolyte Replacement Protocol as needed, 1 Each, Other, PRN, Shaila, Lizette B, NP    dextrose 10 % infusion 0-250 mL, 0-250 mL, IntraVENous, PRN, Shaila, Lizette B, NP, Last Rate: 150 mL/hr at 02/24/23 0220, 75 mL at 02/24/23 0220    acetaminophen (TYLENOL) tablet 650 mg, 650 mg, Oral, Q6H PRN, Shaila, Lizette B, NP, 650 mg at 02/17/23 0200    ondansetron (ZOFRAN) injection 4 mg, 4 mg, IntraVENous, Q4H PRN, Shaila, Lizette B, NP    albuterol-ipratropium (DUO-NEB) 2.5 MG-0.5 MG/3 ML, 3 mL, Nebulization, Q6H PRN, Shaila, Lizette B, NP    senna-docusate (PERICOLACE) 8.6-50 mg per tablet 1 Tablet, 1 Tablet, Oral, DAILY, Shaila, Lizette B, NP, 1 Tablet at 03/03/23 0856    polyethylene glycol (MIRALAX) packet 17 g, 17 g, Oral, DAILY, Shaila, Lizette B, NP, 17 g at 03/03/23 0856    bisacodyL (DULCOLAX) suppository 10 mg, 10 mg, Rectal, DAILY PRN, Shaila, Lizette B, NP, 10 mg at 02/22/23 0839    0.45% sodium chloride infusion, 10 mL/hr, IntraVENous, CONTINUOUS, Zia Lee MD, Last Rate: 10 mL/hr at 03/05/23 0751, 10 mL/hr at 03/05/23 0751    DOBUTamine (DOBUTREX) 500 mg/250 mL (2,000 mcg/mL) infusion, 0-10 mcg/kg/min, IntraVENous, TITRATE, Josh Johnson MD, Stopped at 02/23/23 1044    dexmedeTOMidine in 0.9 % NaCl (PRECEDEX) 400 mcg/100 mL (4 mcg/mL) infusion soln, 0.1-1.5 mcg/kg/hr, IntraVENous, TITRATE, Josh Johnson MD, Last Rate: 17 mL/hr at 03/05/23 1005, 1.2 mcg/kg/hr at 03/05/23 1005    0.9% sodium chloride infusion, 9 mL/hr, IntraVENous, CONTINUOUS, Nicole Medina MD, Last Rate: 11 mL/hr at 03/05/23 0750, 11 mL/hr at 03/05/23 0750    PATIENT CARE TEAM:  Patient Care Team:  Tigist Quintanilla MD as PCP - General (Family Medicine)  Tigist Quintanilla MD as PCP - [address] [place] [address] Provider  Shantell Burton MD (Cardiovascular Disease Physician)  Eliseo Collins MD (Gastroenterology)  Nicole Medina MD (Cardiothoracic Surgery)  Vira Mueller MD (Cardiovascular Disease Physician)  Sarthak Hammer MD (Nephrology)  Adam Ibarra MD (Pulmonary Disease)  Tom Rubin MD ([address] [place] [address] Vascular Surgery)     Thank you for allowing me to participate in this patient's care.     Lesvia Botello MD   [address] [place] [address], [address]  Phone: [phone]    Critically ill  Critical care 40 min

## 2023-03-06 NOTE — WOUND CARE
WOCN Note:      Follow up for heels and sacrum. Admitted on 12/22/22 for sepsis, cardiogenic shock, respiratory failure. LVAD 2/15; Impella 2/19/23; CVA 3/1; SAH enlarged 3/2  History of MI, CABG x3, DM, CHF. Assessment:   Intubated, sedated. Vasquez  Resting on NARENDRA mattress with heels offloaded by pillows. 1. POA Unstageable Pressure Injury  Dry, stable eschar - unchanged  Betadine in use. 2. Left Heel Deep Tissue Injury  Blood-filled bullae - unchanged    3. Sacral deep tissue injury  ~5 x 4 cm with irregular margins  No blistering or induration  Venelex and foam applied    Wound Recommendations:    Sacrum: Apply Balsam peru-castor oil (Venelex) daily and cover with sacral foam   Bilateral heels: paint with betadine daily and allow to air dry; offload heels at all times. DO NOT apply Venelex to heels. PI Prevention:  Turn/reposition approximately every 2 hours  Offload heels with heels hanging off end of pillow at all times while in bed. Sacral Foam dressing: lift to assess regularly; change as needed. Discontinue if incontinence is frequently soiling dressing. Low Air Loss mattress: Use only flat sheet and one incontinence pad. No changes in condition to share with provider.      Transition of Care: Plan to follow weekly and as needed while admitted to hospital.      Tammy Williamson, [facility]

## 2023-03-06 NOTE — PROGRESS NOTES
[facility]  Hospitalist Group                                                                                          Hospitalist Progress Note  Leanna Vail MD  Answering service: [phone] from in house phone        Date of Service:  3/6/2023  NAME:  Sandra Kraus  :  1950  MRN:  [id number]      Admission Summary:     Patient presents with decompensated congestive heart failure. Interval history / Subjective:     Complaining of some pain in the right heel where he has wounds as well as pain in the back.      Assessment & Plan:     Congestive heart failure  -Acute on chronic systolic CHF, NYHA class IV on admission  -Patient with known history of ischemic cardiomyopathy, echo this admission shows EF of 20%  -On milrinone for inotrope assisted diuresis, Lasix currently on hold due to elevated creatinine  -On Toprol, cannot tolerate ACE or ARB due to hypotension, cannot tolerate Aldactone due to hyperkalemia  -AHF team following, ongoing work-up for LVAD    A flutter with RVR  -Now off of amnio drip, on p.o. amiodarone plan for 400 mg twice daily for 14 days then 400 mg daily  -Cardiology and EP following, patient was recommended for AV node ablation and BiV ICD    Community-acquired pneumonia  -Chest x-ray on admission shows diffuse focal opacities concerning for pneumonia  -Blood cultures so far negative, COVID-negative, flu negative, mycoplasma and Legionella negative  -Completed antibiotics    TANNER  -TANNER on CKD stage III, suspect cardiorenal effect  -Patient with hyperkalemia, has received Lokelma  -Currently holding Lasix due to TANNER  -Nephrology following    Well differentiated neuroendocrine tumor  -Patient is s/p EGD and colonoscopy as part of LVAD work-up and biopsy of the polyp in the stomach stomach came back as neuroendocrine tumor  -Oncology evaluating, to proceed with further imaging for staging, pending decision for LVAD placement  -Per oncology, patient with excellent prognosis    Left upper extremity DVT  -Venous Doppler showed thrombus in the left forearm basilic vein extending to distal upper arm basilic vein also thrombus in one of the paired left radial veins  -On anticoagulation with Eliquis    Wound on the left heel and back  -Wound care consult    Hypertension  -Continue Toprol  -Monitor blood pressure    Diabetes type 2  -Continue Lantus along with Premeal Humalog and insulin sliding scale coverage    Dyslipidemia  -Continue statin    GERD  -Continue PPI    History of PAD  -On aspirin    BPH with urinary retention  -Patient to remain with Vasquez catheter  -Urology previously evaluated the patient     Code status: Full  Prophylaxis: [address] discussed with: Patient and patient's wife present at bedside and patient's son over the phone. Anticipated Disposition: 24 to 48 hours    CRITICAL CARE ATTESTATION:  I had a face to face encounter with the patient, reviewed and interpreted patient data including clinical events, labs, images, vital signs, I/O's, and examined patient. I have discussed the case and the plan and management of the patient's care with the consulting services, the bedside nurses and necessary ancillary providers. NOTE OF PERSONAL INVOLVEMENT IN CARE   This patient has a high probability of imminent, clinically significant deterioration, which requires the highest level of preparedness to intervene urgently. I participated in the decision-making and personally managed or directed the management of the following life and organ supporting interventions that required my frequent assessment to treat or prevent imminent deterioration. I personally spent 45 minutes of critical care time. This is time spent at this critically ill patient's bedside actively involved in patient care as well as the coordination of care and discussions with the patient's family.   This does not include any procedural time which has been billed separately. Hospital Problems  Date Reviewed: 1/24/2023            Codes Class Noted POA    CHF (congestive heart failure) ([facility] [address] ICD-10-CM: I50.9  ICD-9-CM: 428.0  12/12/2022 Unknown               Review of Systems:   A comprehensive review of systems was negative except for that written in the HPI. Vital Signs:    Last 24hrs VS reviewed since prior progress note. Most recent are:  Visit Vitals  /64   Pulse 79   Temp 98.5 °F (36.9 °C)   Resp 20   Ht 5' 6\" (1.676 m)   Wt 64 kg (141 lb 1.5 oz)   SpO2 100%   BMI 22.77 kg/m²         Intake/Output Summary (Last 24 hours) at 3/6/2023 1338  Last data filed at 3/6/2023 1300  Gross per 24 hour   Intake 2557.97 ml   Output 60 ml   Net 2497.97 ml        Physical Examination:     I had a face to face encounter with this patient and independently examined them on 3/6/2023 as outlined below:          Constitutional:  No acute distress, cooperative, pleasant    ENT:  Oral mucosa moist, oropharynx benign. Resp:  CTA bilaterally. No wheezing/rhonchi/rales. No accessory muscle use. CV:  Regular rhythm, normal rate, no murmurs, gallops, rubs    GI:  Soft, non distended, non tender. normoactive bowel sounds, no hepatosplenomegaly     Musculoskeletal:  No edema, warm, 2+ pulses throughout    Neurologic:  Moves all extremities. AAOx3, CN II-XII reviewed            Data Review:    Review and/or order of clinical lab test    I have independently reviewed and interpreted patient's lab and all other diagnostic data    Notes reviewed from all clinical/nonclinical/nursing services involved in patient's clinical care. Care coordination discussions were held with appropriate clinical/nonclinical/ nursing providers based on care coordination needs.      Labs:     Recent Labs     03/06/23  0313 03/05/23  0509   WBC 7.4 7.0   HGB 8.8* 8.6*   HCT 27.9* 29.2*    243     Recent Labs     03/06/23  0309 03/05/23  0509 03/04/23  0402    135* 138   K 4.8 4.4 4.5   CL 108 106 106   CO2 22 24 24   BUN 36* 23* 24*   CREA 2.39* 1.64* 1.67*   * 201* 199*   CA 13.2* 12.4* 12.1*   MG 3.2* 2.8* 3.1*   PHOS  --  3.4 3.2   URICA  --  1.9*  --      Recent Labs     03/06/23  0309 03/05/23  0509 03/04/23  0402   ALT 86* 81* 78   * 146* 134*   TBILI 11.7* 12.0* 13.2*   TP 6.6 6.7 7.0   ALB 3.6 3.7 4.1   GLOB 3.0 3.0 2.9   LPSE  --  1,345*  --      Recent Labs     03/06/23  0309 03/05/23  0509 03/04/23  0402   INR 1.5* 1.5* 1.5*   PTP 15.1* 15.6* 15.7*   APTT 33.4*  --  33.9*      Recent Labs     03/05/23  0509   TIBC 152*   PSAT 24   FERR 595*      Lab Results   Component Value Date/Time    Folate 11.2 02/07/2023 04:48 AM      No results for input(s): PH, PCO2, PO2 in the last 72 hours.   Recent Labs     03/05/23  0509   CPK 76     Lab Results   Component Value Date/Time    Cholesterol, total 170 01/12/2023 05:00 AM    HDL Cholesterol 40 01/12/2023 05:00 AM    LDL, calculated 87 01/12/2023 05:00 AM    Triglyceride 218 (H) 03/01/2023 12:11 PM    CHOL/HDL Ratio 4.3 01/12/2023 05:00 AM     Lab Results   Component Value Date/Time    Glucose (POC) 148 (H) 03/06/2023 12:56 PM    Glucose (POC) 108 03/06/2023 04:26 AM    Glucose (POC) 103 03/05/2023 11:45 PM    Glucose (POC) 100 03/05/2023 08:46 PM    Glucose (POC) 109 03/05/2023 04:14 PM     Lab Results   Component Value Date/Time    Color DARK YELLOW 02/20/2023 12:00 PM    Appearance TURBID (A) 02/20/2023 12:00 PM    Specific gravity 1.025 02/20/2023 12:00 PM    Specific gravity 1.013 01/01/2023 09:13 AM    pH (UA) 5.0 02/20/2023 12:00 PM    Protein 300 (A) 02/20/2023 12:00 PM    Glucose 100 (A) 02/20/2023 12:00 PM    Ketone Negative 02/20/2023 12:00 PM    Bilirubin Negative 01/28/2023 12:11 PM    Urobilinogen 0.2 02/20/2023 12:00 PM    Nitrites Positive (A) 02/20/2023 12:00 PM    Leukocyte Esterase LARGE (A) 02/20/2023 12:00 PM    Epithelial cells FEW 02/20/2023 12:00 PM    Bacteria 2+ (A) 02/20/2023 12:00 PM    WBC 5-10 02/20/2023 12:00 PM    RBC 5-10 02/20/2023 12:00 PM         Medications Reviewed:     Current Facility-Administered Medications   Medication Dose Route Frequency    insulin NPH (NOVOLIN N, HUMULIN N) injection 10 Units  10 Units SubCUTAneous Q12H    calciTONIN (MIACALCIN) injection 256 Int'l Units  4 Int'l Units/kg IntraMUSCular Q12H    cinacalcet (SENSIPAR) tablet 30 mg  30 mg Oral DAILY WITH BREAKFAST    0.9% sodium chloride infusion 250 mL  250 mL IntraVENous CONTINUOUS    hydrALAZINE (APRESOLINE) 20 mg/mL injection 5 mg  5 mg IntraVENous Q6H PRN    hydrALAZINE (APRESOLINE) tablet 5 mg  5 mg Oral PRN    niCARdipine (CARDENE) 25 mg in 0.9% sodium chloride 250 mL ([id number])  0-15 mg/hr IntraVENous TITRATE    aspirin chewable tablet 81 mg  81 mg Per NG tube DAILY    levETIRAcetam (KEPPRA) injection 500 mg  500 mg IntraVENous Q12H    bicarbonate dialysis (PRISMASOL) BG K 4/Ca 2.5 5000 ml solution   Extracorporeal DIALYSIS CONTINUOUS    insulin lispro (HUMALOG) injection   SubCUTAneous Q4H    epoetin heidy-epbx (RETACRIT) injection 10,000 Units  10,000 Units SubCUTAneous Q TUE, THU & SAT    EPINEPHrine (ADRENALIN) 10 mg in 0.9% sodium chloride 250 mL infusion  0-10 mcg/min IntraVENous TITRATE    propofol (DIPRIVAN) 10 mg/mL infusion  0-50 mcg/kg/min IntraVENous TITRATE    HYDROmorphone 30 mg/30 mL (1 mg/mL) infusion  0.5-1 mg/hr IntraVENous CONTINUOUS    dextrose (D50W) injection syrg 25 g  25 g IntraVENous PRN    neomycin-polymyxin-dexamethasone (DEXACINE) 3.5 mg/g-10,000 unit/g-0.1 % ophthalmic ointment   Both Eyes BID    NOREPINephrine (LEVOPHED) 8 mg in 5% dextrose 250mL (32 mcg/mL) infusion  0.5-16 mcg/min IntraVENous TITRATE    colchicine tablet 0.6 mg  0.6 mg Oral DAILY    fluconazole (DIFLUCAN) 400mg/200 mL IVPB (premix)  400 mg IntraVENous Q24H    vancomycin (VANCOCIN) 750 mg in 0.9% sodium chloride 250 mL (Docy7Bor)  750 mg IntraVENous Q24H    piperacillin-tazobactam (ZOSYN) 3.375 g in 0.9% sodium chloride (MBP/ADV) 100 mL MBP  3.375 g IntraVENous Q8H    Vancomycin - pharmacy to dose   Other Rx Dosing/Monitoring    metroNIDAZOLE (FLAGYL) IVPB premix 500 mg  500 mg IntraVENous Q12H    heparin (porcine) 1,000 unit/mL injection 1,700 Units  1,700 Units InterCATHeter DIALYSIS PRN    And    heparin (porcine) 1,000 unit/mL injection 1,500 Units  1,500 Units InterCATHeter DIALYSIS PRN    balsam peru-castor oiL (VENELEX) ointment   Topical BID    acetaminophen (TYLENOL) solution 650 mg  650 mg Oral Q4H PRN    acetaminophen (TYLENOL) suppository 650 mg  650 mg Rectal Q4H PRN    HYDROmorphone (DILAUDID) injection 0.5 mg  0.5 mg IntraVENous Q3H PRN    HYDROmorphone (DILAUDID) injection 1 mg  1 mg IntraVENous Q4H PRN    [Held by provider] heparin 25,000 units in D5W 250 ml infusion  12-25 Units/kg/hr IntraVENous TITRATE    albumin human 25% (BUMINATE) solution 12.5 g  12.5 g IntraVENous Q6H    bumetanide (BUMEX) injection 0.5 mg  0.5 mg IntraVENous Q4H PRN    glucose chewable tablet 16 g  4 Tablet Oral PRN    glucagon (GLUCAGEN) injection 1 mg  1 mg IntraMUSCular PRN    sodium chloride (NS) flush 5-40 mL  5-40 mL IntraVENous Q8H    sodium chloride (NS) flush 5-40 mL  5-40 mL IntraVENous PRN    naloxone (NARCAN) injection 0.4 mg  0.4 mg IntraVENous PRN    ELECTROLYTE REPLACEMENT NOTE: Nurse to review Serum Potassium and Magnesuim levels and Initiate Electrolyte Replacement Protocol as needed  1 Each Other PRN    dextrose 10 % infusion 0-250 mL  0-250 mL IntraVENous PRN    acetaminophen (TYLENOL) tablet 650 mg  650 mg Oral Q6H PRN    ondansetron (ZOFRAN) injection 4 mg  4 mg IntraVENous Q4H PRN    albuterol-ipratropium (DUO-NEB) 2.5 MG-0.5 MG/3 ML  3 mL Nebulization Q6H PRN    senna-docusate (PERICOLACE) 8.6-50 mg per tablet 1 Tablet  1 Tablet Oral DAILY    polyethylene glycol (MIRALAX) packet 17 g  17 g Oral DAILY    bisacodyL (DULCOLAX) suppository 10 mg  10 mg Rectal DAILY PRN    0.45% sodium chloride infusion  10 mL/hr IntraVENous CONTINUOUS DOBUTamine (DOBUTREX) 500 mg/250 mL (2,000 mcg/mL) infusion  0-10 mcg/kg/min IntraVENous TITRATE    dexmedeTOMidine in 0.9 % NaCl (PRECEDEX) 400 mcg/100 mL (4 mcg/mL) infusion soln  0.1-1.5 mcg/kg/hr IntraVENous TITRATE    0.9% sodium chloride infusion  9 mL/hr IntraVENous CONTINUOUS     ______________________________________________________________________  EXPECTED LENGTH OF STAY: 29d 21h  ACTUAL LENGTH OF STAY:          39                 Ruben Sauer MD

## 2023-03-06 NOTE — PROGRESS NOTES
0830 [facility] Hemodynamic Monitoring     Visit Vitals  BP 99/61   Pulse 76   Temp 97.9 °F (36.6 °C)   Resp 12   Ht 5' 6\" (1.676 m)   Wt 64 kg (141 lb 1.5 oz)   SpO2 100%   BMI 22.77 kg/m²         Baseline assessment:        CO 9.9        CI 5.5        SVI 61        SVV 20

## 2023-03-06 NOTE — DIALYSIS
Hemodialysis / [phone]    Vitals Pre Post Assessment Pre Post   BP BP: 98/62 (03/06/23 1515) 138/71 LOC See simple assessment No changes   HR Pulse (Heart Rate): 62 (03/06/23 1603) 78 Lungs See simple assessment No changes   Resp Resp Rate: 20 (03/06/23 1603) 20 Cardiac See simple assessment No changes   Temp Temp: 97.9 °F (36.6 °C) (03/06/23 1515) 97.4 Skin See simple assessment No changes   Weight  N/A N/A Edema See simple assessment No changes   Tele status bedside bedside Pain Pain Intensity 1: 0 (03/05/23 1600) No changes     Orders   Duration: Start: 1602 End: 1902 Total: 3 HRS   Dialyzer: Dialyzer/Set Up Inspection: Other (comment) (HF 1000) (03/05/23 0445)   K Bath: Dialysate K (mEq/L): 4 (03/05/23 0445)   Ca Bath: Dialysate CA (mEq/L): 2.5 (03/05/23 0445)   Na:     Bicarb: Dialysate HCO3 (mEq/L): 35 (02/18/23 1155)   Target Fluid Removal: Goal/Amount of Fluid to Remove (mL): 1000 mL (03/06/23 1515)     Access   Type & Location: L. IJ NONTUNNELED CVC   Comments:  Left IJ nontunneled CVC site WNL. Drsg CDI. Each catheter limb disinfected per p&p, caps removed, hubs disinfected per p&p. + aspiration/blood return/flush.             Labs   HBsAg (Antigen) / date: NEGATIVE / 2/18/2023                                           HBsAb (Antibody) / date: SUSCEPTIBLE/ 2/18/2023    Source:    Obtained/Reviewed  Critical Results Called HGB   Date Value Ref Range Status   03/06/2023 8.8 (L) 12.1 - 17.0 g/dL Final     Potassium   Date Value Ref Range Status   03/06/2023 4.8 3.5 - 5.1 mmol/L Final     Calcium   Date Value Ref Range Status   03/06/2023 13.2 (HH) 8.5 - 10.1 MG/DL Final     Comment:     RESULTS VERIFIED, PHONED TO AND READ BACK BY  LORI RIVERA @ [facility] [place]       BUN   Date Value Ref Range Status   03/06/2023 36 (H) 6 - 20 MG/DL Final     Creatinine   Date Value Ref Range Status   03/06/2023 2.39 (H) 0.70 - 1.30 MG/DL Final        Meds Given   Name Dose Route   Heparin 1 : 1000 1.5 ml / 1500 units Post-HD heparin dwell   Heparin 1 : 1000 1.7 ml / 1700 units Post-HD heparin dwell          Adequacy / Fluid    Total Liters Process: 58.6 L   Net Fluid Removed: 200 ml      Comments   Time Out Done:   (Time) 1600   Admitting Diagnosis: CHF   Consent obtained/signed: Informed Consent Verified: Yes (03/06/23 1515)   Machine / RO # Machine Number: [id number] (03/06/23 1515)   Primary Nurse Rpt Pre: Mary Asher RN   Primary Nurse Rpt Post: Mary Asher RN   Pt Education: Family education provided when called for HD verbal consent  Pt minimally responsive   Care Plan: ongoing   Pts outpatient clinic: N/A     Tx Summary   Comments: All patient's orders, labs, code status, and consents reviewed. Verbal HD consent obtained from patient's wife using 2nd RN/primary nurse for telephone consent and on chart. SBAR report received from primary RN. Timeout complete.  1602 HD tx initiated per MD orders. 1902 HD tx ended per MD orders. All possible blood returned. SBAR report given to primary RN. Upon departure, wife at bedside, VS stable. Bed in lowest position with call bell and all belongings within reach.

## 2023-03-06 NOTE — PROGRESS NOTES
1700: Bedside and Verbal shift change report given to Montserrat SANDOVAL (oncoming nurse) by Winsome Lance (offgoing nurse). Report included the following information SBAR. MAP goal 65-85  HD at bedside    1715: Heparin gtt restarted at 12.    1830: Dr Carmela Starkey at bedside with pt's wife. Consent received, signed and witnessed by this RN for tracheostomy. 1855: HD complete - 200mL removed. 1941: Bedside and Verbal shift change report given to [address] [place] [address] St (oncoming nurse) by Winsome Lance (offgoing nurse). Report included the following information SBAR.

## 2023-03-06 NOTE — PROGRESS NOTES
0830: Assessment done and assumed care. Lizette Linton ANP and Colleen ANP here to see pt. Orders received. Family called and given update. Also spoke with Dr. Law Waggoner about tracheostomy. 1000: Dr. Ritu Pate called Neurosurgery Replaced by [facility] ANP gave orders to start heparin drip and move neuro checks Q4.     1300: Bedside and Verbal shift change report given to [address] [place] [address]  (oncoming nurse) by Jim Diana RN (offgoing nurse). Report included the following information Procedure Summary, Intake/Output, MAR, Recent Results, Med Rec Status, and Cardiac Rhythm NSR .

## 2023-03-06 NOTE — CONSULTS
ID:      ID consulted for abx review and whether need for bladder washes for the candida in the urine in feb 2023. Chart reviewed. Patient examined. Clinical updates obtained from nursing staff. Patient has been on broad-spectrum abx empircally with vanc zosyn flagyl for several days. At this time, the only potential site of infection could be the lungs (thick secretions from ET tube being obtained), but there has been no change in vent requirements as such. Will refrain from culturing this as it might show organisms which are colonizers and not true pathogens at this time. With clinical deterioration, can culture these. He is at a high risk for bloodstream infections given central lines. Also has a new LVAD. At this time,plan to start peeling off abx. Stop vanc and flagyl today. Plan to stop zosyn by 3/7/23. Please send blood cultures if any fever >100.4F. Will follow.        Madisyn Machuca MD  Infectious Diseases

## 2023-03-06 NOTE — PROGRESS NOTES
Name: Ana Torres   MRN: [id number]  : 1950      Assessment:  TANNER on CKD-3a: Suspect cardiorenal effects with needed diuresis. Now has LVAD and  POST OP ATN. Anuric->Requiring CRRT  hypercalcemia/immobilization- also has elevated PTH; may have component of 1ry vs secondary HPT  Possible pancreatitis- with elevated lipase; Hypercalcemia can contribute  Ischemic CM: Recurrent exacerbations. EF 20%. S/p LVAD on 2/15. Required Impella RP for RV support-> attempts to wean not tolerated by patient. CVA  Sepsis  BPH   Well differentiated NET Grade 1: noted on EGD 23  Anemia 2 to CKD:  s/p PRBCS    Left UE basilic and radial vein thrombus  Thrombocytopenia     Was on CVVH. Hemodynamics OK. Will try to transition to iHD as tolerated. High Ca is exacerbated by immobilization but will need to Rx to reduce risk of pancreatitis (elevated lipase). Spoke with [address] can be given as suspension. Plan/Recommendations:  HD today- 3 hrs, 0-1 Kg UF, Lowest Ca bath available  Sensipar 30 mg via NGT every day (to be given as suspension)  Will also order Calcitonin SC for 2 days  Will hold off on IV Zometa or Pamidronate as last resort  Fluid mgt as per CHF team   BRIGHT  PRBC as per cardiac team    D/W RN, Dr Thomas Gomez and  [facility]  D/W Melanie Lang. HD orders placed    Subjective:   Intubated    ROS:   UTO    Exam:  Visit Vitals  /64   Pulse 79   Temp 98.5 °F (36.9 °C)   Resp 20   Ht 5' 6\" (1.676 m)   Wt 64 kg (141 lb 1.5 oz)   SpO2 100%   BMI 22.77 kg/m²     NAD  +icterus  +ETT  Dec BS  +LVAD hum  +edema      Current Facility-Administered Medications   Medication Dose Route Frequency Last Admin    insulin NPH (NOVOLIN N, HUMULIN N) injection 10 Units  10 Units SubCUTAneous Q12H 10 Units at 23 1048    0.9% sodium chloride infusion 250 mL  250 mL IntraVENous CONTINUOUS 250 mL at 03/05/23 0926    hydrALAZINE (APRESOLINE) 20 mg/mL injection 5 mg  5 mg IntraVENous Q6H PRN      hydrALAZINE (APRESOLINE) tablet 5 mg  5 mg Oral PRN      niCARdipine (CARDENE) 25 mg in 0.9% sodium chloride 250 mL ([id number])  0-15 mg/hr IntraVENous TITRATE Stopped at 03/05/23 1925    aspirin chewable tablet 81 mg  81 mg Per NG tube DAILY 81 mg at 03/06/23 1028    levETIRAcetam (KEPPRA) injection 500 mg  500 mg IntraVENous Q12H 500 mg at 03/06/23 0108    bicarbonate dialysis (PRISMASOL) BG K 4/Ca 2.5 5000 ml solution   Extracorporeal DIALYSIS CONTINUOUS New Bag at 03/05/23 1106    insulin lispro (HUMALOG) injection   SubCUTAneous Q4H 2 Units at 03/04/23 0840    epoetin heidy-epbx (RETACRIT) injection 10,000 Units  10,000 Units SubCUTAneous Q TUE, THU & SAT 10,000 Units at 03/04/23 2050    EPINEPHrine (ADRENALIN) 10 mg in 0.9% sodium chloride 250 mL infusion  0-10 mcg/min IntraVENous TITRATE Stopped at 03/05/23 0920    propofol (DIPRIVAN) 10 mg/mL infusion  0-50 mcg/kg/min IntraVENous TITRATE Stopped at 03/02/23 1628    HYDROmorphone 30 mg/30 mL (1 mg/mL) infusion  0.5-1 mg/hr IntraVENous CONTINUOUS 0.5 mg/hr at 03/06/23 0811    dextrose (D50W) injection syrg 25 g  25 g IntraVENous PRN 9 mL at 02/25/23 1225    neomycin-polymyxin-dexamethasone (DEXACINE) 3.5 mg/g-10,000 unit/g-0.1 % ophthalmic ointment   Both Eyes BID Given at 03/06/23 1032    NOREPINephrine (LEVOPHED) 8 mg in 5% dextrose 250mL (32 mcg/mL) infusion  0.5-16 mcg/min IntraVENous TITRATE Stopped at 03/05/23 0710    colchicine tablet 0.6 mg  0.6 mg Oral DAILY 0.6 mg at 03/06/23 1028    fluconazole (DIFLUCAN) 400mg/200 mL IVPB (premix)  400 mg IntraVENous Q24H 400 mg at 03/05/23 1140    vancomycin (VANCOCIN) 750 mg in 0.9% sodium chloride 250 mL ([id number])  750 mg IntraVENous Q24H 750 mg at 03/05/23 1515    piperacillin-tazobactam (ZOSYN) 3.375 g in 0.9% sodium chloride (MBP/ADV) 100 mL MBP  3.375 g IntraVENous Q8H 3.375 g at 03/06/23 0532 Vancomycin - pharmacy to dose   Other Rx Dosing/Monitoring      metroNIDAZOLE (FLAGYL) IVPB premix 500 mg  500 mg IntraVENous Q12H 500 mg at 03/06/23 0943    heparin (porcine) 1,000 unit/mL injection 1,700 Units  1,700 Units InterCATHeter DIALYSIS PRN 1,700 Units at 03/05/23 1341    And    heparin (porcine) 1,000 unit/mL injection 1,500 Units  1,500 Units InterCATHeter DIALYSIS PRN 1,500 Units at 03/05/23 1341    balsam peru-castor oiL (VENELEX) ointment   Topical BID Given at 03/06/23 1031    acetaminophen (TYLENOL) solution 650 mg  650 mg Oral Q4H  mg at 02/20/23 0401    acetaminophen (TYLENOL) suppository 650 mg  650 mg Rectal Q4H  mg at 02/17/23 2013    HYDROmorphone (DILAUDID) injection 0.5 mg  0.5 mg IntraVENous Q3H PRN 0.5 mg at 02/22/23 2225    HYDROmorphone (DILAUDID) injection 1 mg  1 mg IntraVENous Q4H PRN 1 mg at 02/28/23 0557    [Held by provider] heparin 25,000 units in D5W 250 ml infusion  12-25 Units/kg/hr IntraVENous TITRATE 13 Units/kg/hr at 03/02/23 0826    albumin human 25% (BUMINATE) solution 12.5 g  12.5 g IntraVENous Q6H 12.5 g at 03/06/23 0536    bumetanide (BUMEX) injection 0.5 mg  0.5 mg IntraVENous Q4H PRN 0.5 mg at 02/17/23 1431    glucose chewable tablet 16 g  4 Tablet Oral PRN      glucagon (GLUCAGEN) injection 1 mg  1 mg IntraMUSCular PRN      sodium chloride (NS) flush 5-40 mL  5-40 mL IntraVENous Q8H 10 mL at 03/05/23 2219    sodium chloride (NS) flush 5-40 mL  5-40 mL IntraVENous PRN 40 mL at 02/17/23 1818    naloxone (NARCAN) injection 0.4 mg  0.4 mg IntraVENous PRN      ELECTROLYTE REPLACEMENT NOTE: Nurse to review Serum Potassium and Magnesuim levels and Initiate Electrolyte Replacement Protocol as needed  1 Each Other PRN      dextrose 10 % infusion 0-250 mL  0-250 mL IntraVENous PRN 75 mL at 02/24/23 0220    acetaminophen (TYLENOL) tablet 650 mg  650 mg Oral Q6H  mg at 02/17/23 0200    ondansetron (ZOFRAN) injection 4 mg  4 mg IntraVENous Q4H PRN albuterol-ipratropium (DUO-NEB) 2.5 MG-0.5 MG/3 ML  3 mL Nebulization Q6H PRN      senna-docusate (PERICOLACE) 8.6-50 mg per tablet 1 Tablet  1 Tablet Oral DAILY 1 Tablet at 03/03/23 0856    polyethylene glycol (MIRALAX) packet 17 g  17 g Oral DAILY 17 g at 03/03/23 0856    bisacodyL (DULCOLAX) suppository 10 mg  10 mg Rectal DAILY PRN 10 mg at 02/22/23 0839    0.45% sodium chloride infusion  10 mL/hr IntraVENous CONTINUOUS 10 mL/hr at 03/05/23 0751    DOBUTamine (DOBUTREX) 500 mg/250 mL (2,000 mcg/mL) infusion  0-10 mcg/kg/min IntraVENous TITRATE Stopped at 02/23/23 1044    dexmedeTOMidine in 0.9 % NaCl (PRECEDEX) 400 mcg/100 mL (4 mcg/mL) infusion soln  0.1-1.5 mcg/kg/hr IntraVENous TITRATE 0.4 mcg/kg/hr at 03/06/23 1031    0.9% sodium chloride infusion  9 mL/hr IntraVENous CONTINUOUS 9 mL/hr at 03/06/23 4461       Labs/Data:    Lab Results   Component Value Date/Time    WBC 7.4 03/06/2023 03:13 AM    HGB 8.8 (L) 03/06/2023 03:13 AM    HCT 27.9 (L) 03/06/2023 03:13 AM    PLATELET 150 14/11/5938 03:13 AM    .0 (H) 03/06/2023 03:13 AM       Lab Results   Component Value Date/Time    Sodium 139 03/06/2023 03:09 AM    Potassium 4.8 03/06/2023 03:09 AM    Chloride 108 03/06/2023 03:09 AM    CO2 22 03/06/2023 03:09 AM    Anion gap 9 03/06/2023 03:09 AM    Glucose 134 (H) 03/06/2023 03:09 AM    BUN 36 (H) 03/06/2023 03:09 AM    Creatinine 2.39 (H) 03/06/2023 03:09 AM    BUN/Creatinine ratio 15 03/06/2023 03:09 AM    GFR est AA 46 (L) 06/23/2022 09:40 AM    GFR est non-AA 38 (L) 06/23/2022 09:40 AM    eGFR 28 (L) 03/06/2023 03:09 AM    eGFR 69 01/25/2023 11:55 AM    Calcium 13.2 (HH) 03/06/2023 03:09 AM       Wt Readings from Last 3 Encounters:   03/06/23 64 kg (141 lb 1.5 oz)   01/25/23 55.8 kg (123 lb)   01/23/23 54.9 kg (121 lb)         Intake/Output Summary (Last 24 hours) at 3/6/2023 1202  Last data filed at 3/6/2023 1100  Gross per 24 hour   Intake 2636.97 ml   Output 60 ml   Net 2576.97 ml       Patient seen and examined. Chart reviewed. Labs, data and other pertinent notes reviewed in last 24 hrs.     PMH/SH/FH reviewed and unchanged compared to H&P      Adele Rivera MD

## 2023-03-06 NOTE — PROGRESS NOTES
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 Edmond Sy  C/b RV failure requiring Impella RP placed 23 and discontinued 3/1/23  C/b acute on chronic renal failure, requiring CRRT  C/b hepatic failure, TB 12 (peak 15.3)  C/b lactic acidosis, persistent  C/b persistent septic shock c/b vasodilation and high outputs, on multiple antibiotics, procalcitonin persistently elevated  C/b R SUPA occlusion with small area of matched perfusion defect - subacute infarct c/b left sided hemiparesis  C/b pancreatitis  C/b failure to extubate x 2; anticipating tracheostomy next week  Patient was approved for LVAD implantation as destination therapy at [facility] 2/3/23; the following have been identified and d/w patient as relative concerns pertaining to LVAD candidacy: small body surface area, cachexia, BMI 18, malnutrition, frailty, advanced age, muscular deconditioning, PVD (fingertips), urinary retention requiring donnelly catheter, neuroendocrine tumor class 1 requiring treatment after LVAD, mild neurocognitive dysfunction, chronic kidney disease and hearing loss requiring hearing aid  Family updated on Friday  Intensivist to discuss trach further with family today. Will discuss removal of chest tubes with Dr. Edmond Sy today. Awaiting recommendations from Neurosurgery to determine if we can restart AC. Now off CRRT. RECOMMENDATIONS:  Continue LVAD at 4700rpm; LVEDD 4.8cm.   increased 3/5/23;  recheck ECHO  Epi off; CI at goal [place] [id number] gtt until after trach for goal SBP < 110mmHg and/or MAP < 90mmHg  Start scheduled antihypertensive regimen after tracheostomy and wean cardene  Check echocardiogram and EKG today  Continuous NICOMs; change patches every other day  No beta-blockers due to hypotension and RV conditioning prior to LVAD  Cannot tolerate ARB/ARNi due to hypotension and upcoming surgical procedure   Cannot tolerate spironolactone due to hyperkalemia  Has been off AC due to [facility]  Cannot tolerate jardiance due to significant diuresis on smallest dose/contributed to IVVD  Previously discontinued corlanor due to SVT  Digoxin stopped d/t risk for toxicity with amio loading  Discontinued amio due to intermitted heart blocks under pacemaker  Timing to switch to intermittent dialysis per nephrology; goal CVP 10-12  Need input on hypercalcemia   Keep K+ >4 and Mg >2  Continue antibiotics, zosyn, vanc, flagyl and diflucan   Spoke to ID to r/o abscess; CT's completed with no abscess identified. ? Abx recs? Continue colchicine 0.6mg daily for possible pericarditis  Hold ASA d/t CVA   Continue to hold coumadin and heparin gtt  Cannot tolerate statins due to abnormal LFT  Timing of chest tube removal per Dr. Murrell Dates - touch base today  Management of tube feeds in light of pancreatitis per intensivist  Continue bowel regimen   Daily dressing changes to Drive line exit site  Pulmonary hygiene   Timing of tracheostomy per CTS and intensivist  VAD education with caregivers/patient when able by VAD coordinator  D/w Dr. Prosper Ruiz and bedside staff      INTERVAL HISTORY:  No events overnight  Moves x 4; left sided significant weakness  Afebrile  MAPs 60-90s, HR 70s  CVP 9  Epi 0, precedex, cardene gtt  I/O net positive 3L; weight up 4#  Hgb 8.6  INR 1.5  Ca 13.2  Cr 2.39;  now off CRRT  TB 11.7 from 12 from 13.2 from 13.6 from 15, peak 15.3  Lactic  2.1 from 2.3 from 2.7 from 2.0 from 1.7  Lipase 1345 3/5/23  Procalc   2.18 from 2.58 from 3.7   Mr. Kendall is awake on  the vent. Opens eyes. Moves extremities. Attempts to communicate, but very difficult due to intubation and [place]. CXR (3/5/23) mild pulmonary edema. Small pleural effusions and bibasilar airspace opacities. Head CTA (3/2/23) Occlusion distal right anterior cerebral artery, with associated small area of matched perfusion defect and cortical enhancement, consistent with subacute infarct. Diminutive and irregular right vertebral artery, occluded at the V3-4 junction, age indeterminate extensive multifocal atherosclerosis in the intracranial and extracranial circulation. IMPRESSION:  Acute on chronic combined systolic/diastolic  heart failure  Stage D, NYHA class IV symptoms   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, LVEF 20% (by echo 1/2023) and 23% (by cMRI 1/3/23)  Cardiac MRI suggestive of ischemic cardiomyopathy  PYP equivocal  S/p HeartMate 3  LVAD-DT (2/15/23)  C/b RV failure requiring Impella RP placed 2/18/23 and discontinued 3/1/23  C/b acute on chronic renal failure, requiring CRRT  C/b hepatic failure, TB 12 (peak 15.3)  C/b lactic acidosis, persistent  C/b persistent septic shock c/b vasodilation and high outputs, on multiple antibiotics, procalcitonin persistently elevated  C/b R SUPA occlusion with small area of matched perfusion defect - subacute infarct c/b left sided hemiparesis  C/b pancreatitis  C/b failure to extubate x 2; anticipating tracheostomy this week  Coronary artery disease  CAD s/p CABG x 2: further disease best managed medically due to small vessel size   At risk of sudden cardiac death  Peripheral arterial disease  Bilateral hydronephrosis s/p andrzej  Cardiac risk factors:  HTN  HL  TRISTAN, STOP-BANG 4  DM2  CKD, stage 3  MOCA from 2/9 19/30, consistent with mild cognitive impairment  Hard of hearing  Gastric Neuroendocrine Tumor, elevated gastrin and chromogranin A  Septic shock, improving   Left sided weakness noted night 3/1.   +SAH and subacute occlusion distal R cerebral artery         LIFE GOALS: not readdressed today   Patient's personal goals included: having a few more years with family  Important upcoming milestones or family events: None at this time   The patient identified the following as important for living well: being at home, not being SOB            CARDIAC IMAGING:   ECHO- pending     ECHO (3/2/23) EF 20-25%; LV mod dilated; severe global hypokinesis; RV mod dilated; mildly reduced systolic function;    Echo 2/19/23    Left Ventricle: Severely reduced left ventricular systolic function with a visually estimated EF of 20 - 25%. Not well visualized. Left ventricle size is normal. Increased wall thickness. Unable to assess wall motion. Abnormal diastolic function. LVAD is present. LVAD inflow cannula was not well visualized. Right Ventricle: Not well visualized. Right ventricle is mildly dilated. Moderately reduced systolic function. TAPSE is abnormal.    Mitral Valve: Severe annular calcification at the anterior and posterior leaflets of the mitral valve. Mild regurgitation. Left Atrium: Left atrium is dilated. Right Atrium: Right atrium is dilated. Technical qualifiers: Echo study was technically difficult and technically difficult with poor endocardial visualization. Echo 1/27/23    Left Ventricle: EF by visual approximation is 20%. Left ventricle is mildly dilated. Mitral Valve: Moderately thickened leaflet, at the anterior and posterior leaflets. Moderately calcified leaflet. Moderate regurgitation. Left Atrium: Left atrium is moderately dilated. Technical qualifiers: Echo study was technically difficult with poor endocardial visualization. Contrast used: Definity. Limited study, not all structures viewed     Echo 1/9/23   Left Ventricle: Severely reduced left ventricular systolic function with a visually estimated EF of 25 - 30%. Left ventricle size is normal. Mildly increased wall thickness.      Echo 12/26/22    Left Ventricle: Severely reduced left ventricular systolic function with a visually estimated EF of 25 - 30%. Left ventricle size is normal. Normal wall thickness. There are regional wall motion abnormalities. Grade II diastolic dysfunction with increased LAP. Right Ventricle: Moderately reduced systolic function. TAPSE is abnormal. TAPSE is 1.1 cm. Aortic Valve: Mild stenosis of the aortic valve. AV peak gradient is 13 mmHg. AV peak velocity is 1.8 m/s. Mitral Valve: Not well visualized. Moderate annular calcification at the posterior leaflet of the mitral valve. Mild to moderate regurgitation. Tricuspid Valve: Mildly elevated RVSP. Left Atrium: Left atrium is moderately dilated. 12/8/22    Left Ventricle: Moderately reduced left ventricular systolic function with a visually estimated EF of 35 - 40%. Severe hypokinesis of the following segments: mid anteroseptal, apical anterior, apical septal, apical inferior and apical lateral. Severe hypokinesis of the apex. Mitral Valve: Severely thickened leaflet, at the anterior and posterior leaflets. Severely calcified leaflet, at the anterior and posterior leaflets. Mild annular calcification of the mitral valve. Moderate regurgitation. Left Atrium: Left atrium is mildly dilated. Contrast used: Definity. limited study     EKG 12/22/22 ST, Biatria enlargement, marked ST abnormality     [facility] 12/6/22  1. Normal LVEDP  2. Severe native multivessel coronary artery disease  3. Patent LIMA to LAD and vein graft to distal RCA  4. Recurrent ISR in OM1 stent with now 60 to 70% restenosis  5. Recoil of left main and circumflex stent with now recurrent 40 to 50% stenosis. 6.  Progression of ostial left main disease now to about 60% stenosis  7. Progression of disease in jailed first marginal branch now with diffuse 90% stenosis  8.   High-grade stenosis in the mid to distal right potential femoral artery treated with 6 x 40 mm impact drug-coated balloon angioplasty to reduce the stenosis to less than 40%        HEMODYNAMICS:  RHC 1/9/23  PA 20/9, RA 3, PCWP 8, CI 1.8     CPEST too ill   6MW 300 feet    LVAD INTERROGATION:  Device interrogated in person  Device function normal, normal flow, no events  LVAD   Pump Speed (RPM): 4700  Pump Flow (LPM): 3  MAP: 68  PI (Pulsitility Index): 6.6  Power: 3.1   Test: Yes  Back Up  at Bedside & Labeled: Yes  Power Module Test: Yes  Driveline Site Care: No  Driveline Dressing: Clean  Testing  Alarms Reviewed: Yes  Back up SC speed: 4700  Back up Low Speed Limit: 4200  Emergency Equipment with Patient?: Yes  Emergency procedures reviewed?: Yes  Drive line site inspected?: No  Drive line intergrity inspected?: Yes  Drive line dressing changed?: No    PHYSICAL EXAM:  Visit Vitals  /64   Pulse 91   Temp 98.5 °F (36.9 °C)   Resp 20   Ht 5' 6\" (1.676 m)   Wt 142 lb 3.2 oz (64.5 kg)   SpO2 98%   BMI 22.95 kg/m²     Physical Assessment:   General Appearance: alert, cooperative, ill appearing, cachectic elderly male resting in bed in NAD; appears stated age  Eyes: sclera icteric  Mouth/Throat: dry mucous membranes; intubated  Neck: supple;   Pulmonary:  clear to auscultation bilaterally; vent  Cardiovascular: regular rate and rhythm; VAD hum  Abdomen: soft, non-tender, non-distended; bowel sounds normal  Musculoskeletal: no swelling or deformity; moves all extremities;  very weak on left  Extremities: no edema; non-palpable distal pulses. Good cap refill   Skin: warm and dry;  midline sternotomy CDI;  chest tubes; drive line dressin gCDI  Neuro: awake;  [place];  appears anxious.   Weakness lef side   Psych: appears anxious           REVIEW OF SYSTEMS:  Review of Systems   Unable to perform ROS: Intubated and patient [place]    PAST MEDICAL HISTORY:  Past Medical History:   Diagnosis Date    CAD (coronary artery disease) 11/10/2016    NSTEMI & 2 stents    Deafness 10/28/2012    DM (diabetes mellitus) (HCC)     Elevated cholesterol     Hypertension NSTEMI (non-ST elevated myocardial infarction) ([facility] [address] 11/10/2016       PAST SURGICAL HISTORY:  Past Surgical History:   Procedure Laterality Date    COLONOSCOPY N/A 6/28/2018    COLONOSCOPY performed by Merrill Kilgore MD at [facility] ENDOSCOPY    COLONOSCOPY N/A 1/18/2023    COLONOSCOPY performed by Severo Foot, MD at [facility] ENDOSCOPY    [address]  11/11/2016    2 stents       FAMILY HISTORY:  Family History   Problem Relation Age of Onset    Heart Disease Father     Heart Attack Father     Hypertension Mother     Elevated Lipids Brother     Elevated Lipids Brother     No Known Problems Sister     Elevated Lipids Brother     No Known Problems Son     No Known Problems Daughter     Anesth Problems Neg Hx        SOCIAL HISTORY:  Social History     Socioeconomic History    Marital status:    Tobacco Use    Smoking status: Never     Passive exposure: Never    Smokeless tobacco: Never   Vaping Use    Vaping Use: Never used   Substance and Sexual Activity    Alcohol use: Yes     Alcohol/week: 2.0 standard drinks     Types: 1 Cans of beer, 1 Shots of liquor per week     Comment: rarely    Drug use: No    Sexual activity: Yes     Social Determinants of Health     Financial Resource Strain: Medium Risk    Difficulty of Paying Living Expenses: Somewhat hard   Food Insecurity: Food Insecurity Present    Worried About Running Out of Food in the Last Year: Never true    Ran Out of Food in the Last Year: Often true       LABORATORY RESULTS:     Labs Latest Ref Rng & Units 3/6/2023 3/5/2023 3/4/2023 3/3/2023 3/2/2023 3/1/2023 2/28/2023   WBC 4.1 - 11.1 K/uL 7.4 7.0 7.9 8.0 6.7 8.4 -   RBC 4.10 - 5.70 M/uL 2.79(L) 2.77(L) 2.83(L) 2.66(L) 2.69(L) 2.92(L) -   Hemoglobin 12.1 - 17.0 g/dL 8.8(L) 8.6(L) 8.8(L) 8.2(L) 8. 3(L) 8. 9(L) 8.7(L)   Hematocrit 36.6 - 50.3 % 27. 9(L) 29. 2(L) 29. 1(L) 27. 6(L) 26. 5(L) 27. 3(L) 27. 2(L)   MCV 80.0 - 99.0 . 0(H) 105. 4(H) 102. 8(H) 103. 8(H) 98.5 93.5 - Platelets 975 - 354 K/uL 253 243 276 230 190 201 -   Lymphocytes 12 - 49 % - 11(L) 9(L) 9(L) 6(L) 12 -   Monocytes 5 - 13 % - 8 10 10 5 11 -   Eosinophils 0 - 7 % - 8(H) 6 5 5 6 -   Basophils 0 - 1 % - 1 1 1 1 1 -   Albumin 3.5 - 5.0 g/dL 3.6 3.7 4.1 3.9 4.0 3.7 -   Calcium 8.5 - 10.1 MG/DL 13. 2(HH) 12. 4(H) 12. 1(H) 11. 5(H) 10. 8(H) 10. 8(H) 10. 3(H)   Glucose 65 - 100 mg/dL 134(H) 201(H) 199(H) 206(H) 189(H) 133(H) 122(H)   BUN 6 - 20 MG/DL 36(H) 23(H) 24(H) 26(H) 25(H) 19 21(H)   Creatinine 0.70 - 1.30 MG/DL 2.39(H) 1.64(H) 1.67(H) 1.67(H) 1.62(H) 1.59(H) 1.61(H)   Sodium 136 - 145 mmol/L 139 135(L) 138 137 135(L) 137 138   Potassium 3.5 - 5.1 mmol/L 4.8 4.4 4.5 4.3 4.4 3.2(L) 3.6   TSH 0.36 - 3.74 uIU/mL - - - - - - -   PSA 0.01 - 4.0 ng/mL - - - - - - -   LDH 85 - 241 U/L 248(H) 285(H) 334(H) 355(H) 419(H) 612(H) -   Some recent data might be hidden     Lab Results   Component Value Date/Time    TSH 3.52 01/28/2023 05:26 AM    TSH 2.12 12/27/2022 02:36 PM    TSH 4.80 (H) 12/06/2022 03:53 AM    TSH 5.39 (H) 10/12/2022 09:10 AM    TSH 3.53 02/03/2022 11:47 AM    TSH 5.790 (H) 11/21/2019 04:45 PM    TSH 3.08 06/22/2018 01:53 PM    TSH 4.250 05/26/2015 09:43 AM       ALLERGY:  Allergies   Allergen Reactions    Ambien [Zolpidem] Other (comments)     Causes extreme confusion        CURRENT MEDICATIONS:    Current Facility-Administered Medications:     insulin NPH (NOVOLIN N, HUMULIN N) injection 10 Units, 10 Units, SubCUTAneous, Q12H, Cathy Sheldon, CNS    0.9% sodium chloride infusion 250 mL, 250 mL, IntraVENous, CONTINUOUS, Zia Lee MD, 250 mL at 03/05/23 0926    hydrALAZINE (APRESOLINE) 20 mg/mL injection 5 mg, 5 mg, IntraVENous, Q6H PRN, Zia Lee MD    hydrALAZINE (APRESOLINE) tablet 5 mg, 5 mg, Oral, PRN, Zia Lee MD    niCARdipine (CARDENE) 25 mg in 0.9% sodium chloride 250 mL ([id number]), 0-15 mg/hr, IntraVENous, TITRATE, Jeffy Jaeger DO, Stopped at 03/05/23 1925    aspirin chewable tablet 81 mg, 81 mg, Per NG tube, DAILY, Piotr Hunter MD, 81 mg at 03/06/23 1028    levETIRAcetam (KEPPRA) injection 500 mg, 500 mg, IntraVENous, Q12H, Nathalie, Yancy, ARNP, 500 mg at 03/06/23 0108    bicarbonate dialysis (PRISMASOL) BG K 4/Ca 2.5 5000 ml solution, , Extracorporeal, DIALYSIS CONTINUOUS, Radha Schultz MD, Last Rate: 1,750 mL/hr at 03/05/23 1106, New Bag at 03/05/23 1106    insulin lispro (HUMALOG) injection, , SubCUTAneous, Q4H, Cathy Sheldon CNS, 2 Units at 03/04/23 0840    epoetin heidy-epbx (RETACRIT) injection 10,000 Units, 10,000 Units, SubCUTAneous, Q TUE, THU & SAT, Radha Schultz MD, 10,000 Units at 03/04/23 2050    EPINEPHrine (ADRENALIN) 10 mg in 0.9% sodium chloride 250 mL infusion, 0-10 mcg/min, IntraVENous, TITRATE, France Johnson MD, Stopped at 03/05/23 0920    amiodarone (CORDARONE) 900 mg/250 ml D5W infusion, 0.5-1 mg/min, IntraVENous, TITRATE, France Johnson MD    vasopressin (VASOSTRICT) 20 Units in 0.9% sodium chloride 100 mL infusion, 0-0.04 Units/min, IntraVENous, TITRATE, France Johnson MD    PHENYLephrine (JONELLE-SYNEPHRINE) 100 mg in 0.9% sodium chloride 250 mL infusion,  mcg/min, IntraVENous, TITRATE, France Johnson MD    propofol (DIPRIVAN) 10 mg/mL infusion, 0-50 mcg/kg/min, IntraVENous, TITRATE, Meir Coello MD, Stopped at 03/02/23 1628    HYDROmorphone 30 mg/30 mL (1 mg/mL) infusion, 0.5-1 mg/hr, IntraVENous, CONTINUOUS, France Johnson MD, Last Rate: 0.5 mL/hr at 03/06/23 0811, 0.5 mg/hr at 03/06/23 0811    dextrose (D50W) injection syrg 25 g, 25 g, IntraVENous, PRN, Almedia Gowers, MD, 9 mL at 02/25/23 1225    neomycin-polymyxin-dexamethasone (DEXACINE) 3.5 mg/g-10,000 unit/g-0.1 % ophthalmic ointment, , Both Eyes, BID, Lizet Sesay MD, Given at 03/06/23 1032    NOREPINephrine (LEVOPHED) 8 mg in 5% dextrose 250mL (32 mcg/mL) infusion, 0.5-16 mcg/min, IntraVENous, TITRATE, France Johnson MD, Stopped at 03/05/23 0710    colchicine tablet 0.6 mg, 0.6 mg, Oral, DAILY, Shaila, Lizette B, NP, 0.6 mg at 03/06/23 1028    fluconazole (DIFLUCAN) 400mg/200 mL IVPB (premix), 400 mg, IntraVENous, Q24H, Lalo Lintonin B, NP, Last Rate: 100 mL/hr at 03/05/23 1140, 400 mg at 03/05/23 1140    vancomycin (VANCOCIN) 750 mg in 0.9% sodium chloride 250 mL ([id number]), 750 mg, IntraVENous, Q24H, Walker Aponte MD, Last Rate: 250 mL/hr at 03/05/23 1515, 750 mg at 03/05/23 1515    piperacillin-tazobactam (ZOSYN) 3.375 g in 0.9% sodium chloride (MBP/ADV) 100 mL MBP, 3.375 g, IntraVENous, Q8H, Walker Aponte MD, Last Rate: 25 mL/hr at 03/06/23 0536, 3.375 g at 03/06/23 0536    Vancomycin - pharmacy to dose, , Other, Rx Dosing/Monitoring, Walker Aponte MD    metroNIDAZOLE (FLAGYL) IVPB premix 500 mg, 500 mg, IntraVENous, Q12H, Coleen MOLINA MD, Last Rate: 100 mL/hr at 03/06/23 0943, 500 mg at 03/06/23 0943    heparin (porcine) 1,000 unit/mL injection 1,700 Units, 1,700 Units, InterCATHeter, DIALYSIS PRN, 1,700 Units at 03/05/23 1341 **AND** heparin (porcine) 1,000 unit/mL injection 1,500 Units, 1,500 Units, InterCATHeter, DIALYSIS PRN, Ester Watkins DO, 1,500 Units at 03/05/23 1341    balsam peru-castor oiL (VENELEX) ointment, , Topical, BID, Javier Warren MD, Given at 03/06/23 1031    acetaminophen (TYLENOL) solution 650 mg, 650 mg, Oral, Q4H PRN, Connie Pierson MD, 650 mg at 02/20/23 0401    acetaminophen (TYLENOL) suppository 650 mg, 650 mg, Rectal, Q4H PRN, Connie Pierson MD, 650 mg at 02/17/23 2013    HYDROmorphone (DILAUDID) injection 0.5 mg, 0.5 mg, IntraVENous, Q3H PRN, Coleen MOLINA MD, 0.5 mg at 02/22/23 2225    HYDROmorphone (DILAUDID) injection 1 mg, 1 mg, IntraVENous, Q4H PRN, Coleen MOLINA MD, 1 mg at 02/28/23 0557    [Held by provider] heparin 25,000 units in D5W 250 ml infusion, 12-25 Units/kg/hr, IntraVENous, TITRATE, Malissa Gar MD, Last Rate: 8.8 mL/hr at 03/02/23 6258, 13 Units/kg/hr at 03/02/23 0826    albumin human 25% (BUMINATE) solution 12.5 g, 12.5 g, IntraVENous, Q6H, Veronica Bonilla MD, Last Rate: 60 mL/hr at 02/27/23 0020, 12.5 g at 03/06/23 0536    bumetanide (BUMEX) injection 0.5 mg, 0.5 mg, IntraVENous, Q4H PRN, Veronica Bonilla MD, 0.5 mg at 02/17/23 1431    glucose chewable tablet 16 g, 4 Tablet, Oral, PRN, Shaila, Lizette B, NP    glucagon (GLUCAGEN) injection 1 mg, 1 mg, IntraMUSCular, PRN, Shaila, Lizette B, NP    sodium chloride (NS) flush 5-40 mL, 5-40 mL, IntraVENous, Q8H, Shaila, Lizette B, NP, 10 mL at 03/05/23 2219    sodium chloride (NS) flush 5-40 mL, 5-40 mL, IntraVENous, PRN, Shaila, Lizette B, NP, 40 mL at 02/17/23 1818    naloxone (NARCAN) injection 0.4 mg, 0.4 mg, IntraVENous, PRN, Shaila, Lizette B, NP    ELECTROLYTE REPLACEMENT NOTE: Nurse to review Serum Potassium and Magnesuim levels and Initiate Electrolyte Replacement Protocol as needed, 1 Each, Other, PRN, Shaila, Lizette B, NP    dextrose 10 % infusion 0-250 mL, 0-250 mL, IntraVENous, PRN, Shaila, Lizette B, NP, Last Rate: 150 mL/hr at 02/24/23 0220, 75 mL at 02/24/23 0220    acetaminophen (TYLENOL) tablet 650 mg, 650 mg, Oral, Q6H PRN, Shaila, Lizette B, NP, 650 mg at 02/17/23 0200    ondansetron (ZOFRAN) injection 4 mg, 4 mg, IntraVENous, Q4H PRN, Shaila, Lizette B, NP    albuterol-ipratropium (DUO-NEB) 2.5 MG-0.5 MG/3 ML, 3 mL, Nebulization, Q6H PRN, Shaila, Lizette B, NP    senna-docusate (PERICOLACE) 8.6-50 mg per tablet 1 Tablet, 1 Tablet, Oral, DAILY, Shaila, Lizette B, NP, 1 Tablet at 03/03/23 0856    polyethylene glycol (MIRALAX) packet 17 g, 17 g, Oral, DAILY, Shaila, Lizette B, NP, 17 g at 03/03/23 0856    bisacodyL (DULCOLAX) suppository 10 mg, 10 mg, Rectal, DAILY PRN, Shaila, Lizette B, NP, 10 mg at 02/22/23 0839    0.45% sodium chloride infusion, 10 mL/hr, IntraVENous, CONTINUOUS, Veronica Bonilla MD, Last Rate: 10 mL/hr at 03/05/23 0751, 10 mL/hr at 03/05/23 0751    DOBUTamine (DOBUTREX) 500 mg/250 mL (2,000 mcg/mL) infusion, 0-10 mcg/kg/min, IntraVENous, TITRATE, Ashia Johnson MD, Stopped at 02/23/23 1044    dexmedeTOMidine in 0.9 % NaCl (PRECEDEX) 400 mcg/100 mL (4 mcg/mL) infusion soln, 0.1-1.5 mcg/kg/hr, IntraVENous, TITRATE, Ashia Johnson MD, Last Rate: 5.7 mL/hr at 03/06/23 1031, 0.4 mcg/kg/hr at 03/06/23 1031    0.9% sodium chloride infusion, 9 mL/hr, IntraVENous, CONTINUOUS, Krishna Bell MD, Last Rate: 9 mL/hr at 03/06/23 0811, 9 mL/hr at 03/06/23 0811    PATIENT CARE TEAM:  Patient Care Team:  Erin Broderick MD as PCP - General (Family Medicine)  Erin Broderick MD as PCP - [facility][1]  Jaime Eason MD (Cardiovascular Disease Physician)  Ivonne Tee MD (Gastroenterology)  Krishna Bell MD (Cardiothoracic Surgery)  Fausto Sofia MD (Cardiovascular Disease Physician)  Paola Fleming MD (Nephrology)  Tala Rodgers MD (Pulmonary Disease)  Jack Del Toro MD ([address] [place] [address] Vascular Surgery)     Thank you for allowing me to participate in this patient's care. Mora Ragsdale NP   [address] [place] [address], [address]  Phone: [phone]    On this date 3/6/2023, I have spent a total time of  35 minutes personally reviewing new vitals, test results, notes, telemetry/EKG, face to face encounter/physical exam of patient, writing orders, performing medical decision making, and documenting.

## 2023-03-06 NOTE — PROGRESS NOTES
0900 NICOM Hemodynamic Monitoring     Visit Vitals  /64   Pulse 91   Temp 98.5 °F (36.9 °C)   Resp 20   Ht 5' 6\" (1.676 m)   Wt 64.5 kg (142 lb 3.2 oz)   SpO2 98%   BMI 22.95 kg/m²         Baseline assessment:        CO 8.2  4.6        CI 4.6        SVI 51        SVV 17

## 2023-03-06 NOTE — PROGRESS NOTES
Comprehensive Nutrition Assessment    Type and Reason for Visit: Reassess    Nutrition Recommendations/Plan:     Continue TF as tolerated - Vital 1.5 @ 40 ml/hr with 1 packet Prosource TID and flushes of 30 mL H2O q 4 hours    If concerns for feeding with ?pancreatitis, consider advancing DHT or replacing with longer tube to feed past ligament of treitz    Current formula remains most appropriate for overall GI tolerance, elevated lipase, elevated t. Bili and LFTs         Malnutrition Assessment:  Malnutrition Status: Moderate malnutrition (01/30/23 1232)    Context:  Acute illness     Findings of the 6 clinical characteristics of malnutrition:   Energy Intake:  75% or less of est energy req for 7 or more days  Weight Loss:  5% over one month     Body Fat Loss:  Mild body fat loss, Buccal region   Muscle Mass Loss:  Mild muscle mass loss, Clavicles (pectoralis & deltoids), Thigh (quadriceps)  Fluid Accumulation:  Mild, Extremities   Strength:  Not performed        Nutrition Assessment:    PMHx: CAD s/p PCI x 2, HFrEF with EF 25-30%, DM, HTN, hypercholesterolemia, Nstemi, CKD stage III. Admitted 1/26 d/t ongoing symptoms r/t his HFrEF and LVAD work-up. Tx to CVICU on 2/3 for [facility] placement and ongoing LVAD workup. Noted, as part of LVAD work-up PTA, pt underwent EGD with bx on 1/18/23 which path revealed well-differentiated neuroendocrine tumor. Oncology consulted, no absolute contraindications to LVAD. Nephrology following for TANNER on CKD 3. LVAD placed 2/15. Pt extubated 2/17 but then had to be re-intubated 2/18 for placement of RVAD 2/18. Had plans to start CVVHD 2/17 however pt too unstable ON; started post RVAD (Impella RP support device). CRRT started 2/18. Tube feeding initiated 2/19. Liver failure with elevated T-bili (hemolysis and/or ischemic vs congestive hepatopathy). Impella removed 3/1. Code neuro on 3/1 and 3/2, SAH noted on CT. Unable to obtain MRI d/t LVAD. AC stopped.   Extubated to BiPAP on 3/2, but re-intubated same day. Elevated lipase 3/5. CRRT stopped 3/5 - reassess planned today (3/6) to determine if CRRT vs HD. Hypercalcemia. 3/6: follow-up. Events noted above. Failed extubation and now discussions re: trach. Off pressors and propofol. Precedex for sedation. Elevated lipase 3/5, ? pancreatitis. Already on more appropriate formula with higher MCT ratio. Continues on TF at goal without overt signs of intolerance. DHT replaced on 3/1 - imaging 3/5 reveals tip in duodenal bulb. Vital 1.5 @ 40 mL/hr with 1 packet Prosource TID and flushes of 30 mL H2o q 4 hours providing 880 mL, 1500 kcal, 104 gm pro, 165 gm CHO, and 669+270+084=1107 mL free H2o. Weight trending up, currently 3 L net positive. 1-2+ pitting edema. BG lower normal - insulin reduced. Ve Observed 7.48 l/min    Temp (24hrs), Av.5 °F (36.9 °C), Min:97.9 °F (36.6 °C), Max:99.4 °F (37.4 °C)          3/1: pt remains intubated, on CRRT. Impella removed today. Propofol was resumed on , but only running @ 5.9 mL/hr which provides 156 kcal.  TF via NG of Vital 1.5 @ 40 mL/hr with 1 packet Prosource TID and flushes of 30 mL h2o q 4 hours providing 880 mL, 1500 kcal, 104 gm pro, 165 gm CHO, 669+270+263=6091 mL free H2o. With propofol = 1656 kcal which meets 104% kcal needs. Off levo, remains on epi. Insulin gtt off - now on NPH BID. BG within target range. Low K+ and Phos, repletion ordered. Having soft BMs. Given malnutrition and overall more stable/ less pressors, current TF regimen remains appropriate. : follow-up. Remains vented on CRRT. Tolerating TF at goal.  Having looses BM 2-3x/day, but nothing excessive per RN. Propofol was at 3.9 mL/hr (103 kcal), but currently off and doing well. Vital 1.5 @ 35 ml/hr with 3 packets Prosource daily; minimal water flush of 30 ml q 4 hr providing 770 ml, 1320 calories, 96 gm protein, 42 gm fat, 43 mEq potassium and 940 ml free water.   K+ WNL, Mg high, Phos low - improved since yesterday. BG well controlled, remains on insulin drip, but plans to adjust to basal insulin soon. Given pt is more stable and with malnutrition, recommend slightly increasing TF to better meet est needs without propofol running. Even if resumed at previous rate, would still be within kcal range/ not significant overfeeding. Will slightly increase TF to Vital 1.5 @ 40 mL/hr with 1 packet Prosource TID and flushes of 30 mL h2o q 4 hours provides 880 mL, 1500 kcal, 104 gm pro, 165 gm CHO, 669+270+555=9578 mL free H2o. This meets 100% kcal needs and 95% pro needs respectively. 2/20: Pt extubated 2/17 but then had to be re-intubated 2/18 for placement of RVAD 2/18. Plan to start CVVHD 2/17 however pt too unstable ON; started post RVAD (Impella RP support device). Tube feeding ordered yesterday; RD consult placed. Liver failure with elevated T-bili (hemolysis and/or ischemic vs congestive hepatopathy). Mr Tammy Cortes has tolerated EN well thus far. Last BM however 2/14-bowel regimen is ordered. Recommend increasing Pericolace to bid since pt requiring a fair amount of Fentanyl. Will change formula to Vital 1.5 to avoid fiber and fat content (structured lipid 2/2 elevated bilirubin). New goal: Vital 1.5 @ 35 ml/hr with 3 packets Prosource daily; continue minimal water flush of 30 ml q 4 hr. This will provide 770 ml, 1320 calories, 96 gm protein, 42 gm fat, 43 mEq potassium and 940 ml free water (tube feeding/flush) per day to meet 88% protein needs. Tube feeding and propofol will meet 100% estimated energy needs (1695 calories per day). 2/17: pt had LVAD placed 2/15 and remains intubated. Spoke with RN yesterday and again today - no plans to start TF, working on extubation now. RN states OG is too small for tube feeds anyway and worries it would clog, PO meds held. Pt would need a DHT. Even if pt extubated, will be a slow progression of diet over weekend.   DHT may need to be considered, but also with Carafate QID, tube feeds would need to be held at various points throughout the day which makes regimen more complicated, so would consider intermittent vs nocturnal feeds if needed. PO intake was improving pre-op, but overall still with significant wt loss and meeting moderate malnutrition status prior to surgery. Weight up as expected post-op (d/t fluid). Currently 150 lb, but was 123 lb on 2/13 standing scale when last seen by this service. Ve Observed 8.68 l/min, tMax:102.4 °F  [place] EEN ~1800 kcal, which is similar to current EEN. Therefore, will not adjust given likelihood of extubation soon. If TF needed, recommend 4 cartons of Nepro - giving 1 carton over 1 hour QID that are timed around the Carafate. 4 cartons of Nepro daily with 60 mL H2O flushes 4x/day provide 948 mL, 1706 kcal, 77 gm pro, 153 gm CHO, and 692+473=262 mL free H2O.     Nutritionally Significant Medications:  Buminate, colchicine, precedex, diflucan, hydromorphine gtt, SSI, NPH 10 units BID, Keppra, flagyl, zosyn, pericolace (held), miralax (held), vancomycin        Estimated Daily Nutrient Needs:  Energy Requirements Based On: Formula  Weight Used for Energy Requirements: Admission (54.4 kg)  Energy (kcal/day): 5918-5992 (25-30 kcal/kg; ~1500 kcal/day using [id number])  Weight Used for Protein Requirements: Admission  Protein (g/day): 110 (2 gm/kg)  Method Used for Fluid Requirements: Standard renal  Fluid (ml/day): 500 mL + OP    Nutrition Related Findings:   Edema: Genital: 1+ (3/5/2023  8:00 AM)  Facial: Scleral (3/5/2023  8:00 PM)  Generalized: 1+; Pitting (3/5/2023  8:00 PM)  LLE: Pitting; 2+ (3/5/2023  8:00 PM)  LUE: 1+; Pitting (3/5/2023  8:00 PM)  RLE: 2+; Pitting (3/5/2023  8:00 PM)  RUE: 1+; Pitting (3/5/2023  8:00 PM)    Last BM: 03/05/23, Soft    Wounds: Multiple, Deep tissue injury, Unstageable, Surgical incision      Current Nutrition Therapies:  Diet: NPO  Nutrition Support: TF via [facility] as above      Anthropometric Measures:  Height: 5' 6\" (167.6 cm)  Ideal Body Weight (IBW): 142 lbs (65 kg)  Admission Body Weight: 120 lb  Current Body Wt:  64.5 kg (142 lb 3.2 oz), 100.1 % IBW.  Bed scale  Current BMI (kg/m2): 23        Weight Adjustment: No adjustment                 BMI Category: Underweight (BMI less than 22) age over 72    Last 3 Recorded Weights in this Encounter    03/04/23 0700 03/05/23 0700 03/06/23 2865   Weight: 62.3 kg (137 lb 5.6 oz) 62.9 kg (138 lb 10.7 oz) 64.5 kg (142 lb 3.2 oz)     Last 3 Recorded Weights in this Encounter    02/25/23 0619 02/26/23 0431 02/27/23 0430   Weight: 67.7 kg (149 lb 4 oz) 65.4 kg (144 lb 2.9 oz) 62.2 kg (137 lb 2 oz)     Wt Readings from Last 10 Encounters:   02/27/23 62.2 kg (137 lb 2 oz)   01/25/23 55.8 kg (123 lb)   01/23/23 54.9 kg (121 lb)   01/21/23 54.4 kg (120 lb)   01/20/23 52.2 kg (115 lb)   01/20/23 52.2 kg (115 lb)   01/19/23 53 kg (116 lb 13.5 oz)   12/19/22 58.5 kg (129 lb)   12/16/22 57.4 kg (126 lb 8.7 oz)   12/05/22 59 kg (130 lb)           Nutrition Diagnosis:   Inadequate oral intake related to impaired respiratory function, cardiac dysfunction as evidenced by intubation, nutrition support-enteral nutrition  Underweight related to inadequate protein-energy intake as evidenced by BMI    Nutrition Interventions:   Food and/or Nutrient Delivery: Continue tube feeding  Nutrition Education/Counseling: No recommendations at this time  Coordination of Nutrition Care: Continue to monitor while inpatient       Goals:  Previous Goal Met: Goal(s) achieved  Goals: other (specify) (to meet >90% of EEN over next 4-7 days)  Specify Other Goals: tolerate TF at goal to meet >90% of EEN over next 4-7 days    Nutrition Monitoring and Evaluation:   Behavioral-Environmental Outcomes: None identified  Food/Nutrient Intake Outcomes: Enteral nutrition intake/tolerance  Physical Signs/Symptoms Outcomes: Biochemical data, GI status, Fluid status or edema, Hemodynamic status, Nutrition focused physical findings, Weight, Skin    Discharge Planning:     Too soon to determine    Recent Labs     03/06/23  0309 03/05/23  0509 03/04/23  0402   * 201* 199*   BUN 36* 23* 24*   CREA 2.39* 1.64* 1.67*    135* 138   K 4.8 4.4 4.5    106 106   CO2 22 24 24   CA 13.2* 12.4* 12.1*   PHOS  --  3.4 3.2   MG 3.2* 2.8* 3.1*       Recent Labs     03/06/23  0309 03/05/23  0509 03/04/23  0402   ALT 86* 81* 78   * 162* 157*   * 146* 134*   TBILI 11.7* 12.0* 13.2*   TP 6.6 6.7 7.0   ALB 3.6 3.7 4.1   GLOB 3.0 3.0 2.9   LPSE  --  1,345*  --        Recent Labs     03/06/23  0313 03/05/23  0509   WBC 7.4 7.0   HGB 8.8* 8.6*   HCT 27.9* 29.2*    243       Recent Labs     03/05/23  0509   PREALB 13.5*     Prealbumin   Date Value Ref Range Status   03/05/2023 13.5 (L) 20.0 - 40.0 mg/dL Final   02/28/2023 11.6 (L) 20.0 - 40.0 mg/dL Final   02/21/2023 5.7 (L) 20.0 - 40.0 mg/dL Final     Recent Labs     03/06/23  1256 03/06/23  0426 03/05/23  2345 03/05/23  2046 03/05/23  1614 03/05/23  1343 03/05/23  0759 03/05/23  0515 03/05/23  0023 03/04/23  2044 03/04/23  1619 03/04/23  1150 03/04/23  0758 03/04/23  0407 03/04/23  0020 03/03/23 2047   GLUCPOC 148* 108 103 100 109 113 190* 186* 133* 159* 182* 192* 223* 176* 182* 152*       Lab Results   Component Value Date/Time    Hemoglobin A1c 7.7 (H) 01/11/2023 06:14 PM    Hemoglobin A1c 6.5 (H) 12/06/2022 03:53 AM    Hemoglobin A1c 7.0 (H) 10/12/2022 09:10 AM         Sonya Jones RD   Available via [employer]

## 2023-03-06 NOTE — DIABETES MGMT
[facility]  DIABETES MANAGEMENT CONSULT    Consulted by  Erica Angelo MD   for advanced nursing evaluation and care for inpatient blood glucose management. Evaluation and Action Plan   Destiny Hernandez is a 71year old gentleman, with Type 2 Diabetes with a recent A1C of 7.7%, who is admitted with arrhythmias and acute on chronic HFrEF with failure to respond to inotrope support. He was discharged one week prior from a prolonged hospital stay for acute on chronic HF and LVAD work-up and discharged home on dobutamine with a lifevest.  Glucose control was tenuous during his previous admission s/t multiple co-morbidities, several tests/procedures requiring NPO status, waxing and waining oral intake. At this time glucose is impacted by:  Heart Failure with reduced EF 25-30%, LVAD implant  Vasopressor use: now off  HD  Enteral Feeds    This admission, he required low basal doses but high bolus doses with meals to offset pre-prandial hyperglycemia. An insulin gtt has been used for glycemic control post-operatively and insulin rates have been erratic over the last 2 weeks. His insulin rates have been stable over the last 4 days transitioned off to SQ basal/correction insulin. His insulin needs fell this weekend, likely related to epi weaning to off. BG target is now at goal of 140-180mg/dl. Action Plan  Reduced NPH to 10 units Q12 as BG trended below goal.  Hold if feeds are held    Humalog at high sensitivity Q4h            Initial Presentation   Destiny Hernandez is a 71 y.o. male who presented to the ED 1/26/23 with lower extremity swelling and difficulty breathing. He had a prolonged hospital admission from 12/22/22-1/19/23 for acute on chronic systolic HF and LVAD work-up. He was discharged with home inotrope. LAB: , GFR 58, Trop 2003, BNP 4524  CXR: New diffuse bilateral increased interstitial markings, partially obscuring bilateral pulmonary nodules.      HX: Past Medical History:   Diagnosis Date    CAD (coronary artery disease) 11/10/2016    NSTEMI & 2 stents    Deafness 10/28/2012    DM (diabetes mellitus) ([facility] [address]     Elevated cholesterol     Hypertension     NSTEMI (non-ST elevated myocardial infarction) ([facility] [address] 11/10/2016        INITIAL DX:   CHF (congestive heart failure) ([facility] [address] [I50.9]     Current Treatment     TX: Milrinone, Amio. Specialty consultations: [facility], Cardiology, EP, GI     Hospital Course   Clinical progress has been complicated by:    0/88: Admission. Rapid response for SVT- Given adenosine x2, amio bolus/gtt  1/27: Advanced HFC consult. EP consult ordered. Intolerance of inotropic support. Cardiology consult. NSTEMI, heparin gtt started. Seen by EP: Continue amio. 1/28: Febrile: CT chest 1/28 shows diffuse focal opacities concerning for pneumonia. Obtain blood cultures, UA. Pathology report 1/18/23: well differentiated neuroendocrine tumor grade 1. EGD: Patchy erythema gastric body, biopsies done. 6 mm sessile polyp gastric body biopsied  1/30: Oncology consult: Recommend multiphase CT of the abdomen and pelvis to evaluate for metastatic spread. Tachycardia and SOB, BiPAP overnight. 2/3: Accepted for VAD implant if renal fx improves per [facility]. CCU Transfer and swan placed  2/4: PRBC transfusion   2/6: With increased dyspnea. Doubtamine started  2/15: LVAD Implant  2/17: Extubated. CRRT started. ECHO with persistent RV failure. OR for RV impella. Remained Intubated post-op  2/20: UA with large leuk esterase and 2+ bacteria. Urine Cx with yeast  2/21: PRBC transfusion, PLT transfusion    2/22: Bronch   3/2: Code Stroke. Head CT Occlusion distal right anterior cerebral artery, with associated small area of matched perfusion defect and cortical enhancement, consistent with subacute infarct.  Diminutive and irregular right vertebral artery, occluded at the V3-4 junction, age indeterminate extensive multifocal atherosclerosis in the intracranial and extracranial circulation. Partly visualized life support lines and tubes. Postsurgical changes in the chest partly visualized. Bilateral pleural effusions. No venous thrombus. Extubated, and re-intubated   3/3: Pancreatitis   Diabetes History   Type 2 Diabetes  Ambulatory BG management provided by: PCP Hollie Morrow MD    Diabetes-related Medical History  Acute complications  Acute hyperglycemia  Neurological complications  Peripheral neuropathy  Microvascular disease  Nephropathy  Macrovascular disease  CAD  Other associated conditions                                       CHF     Diabetes Medication History  Key Antihyperglycemic Medications        Diabetes Medication History  Key Antihyperglycemic Medications               metFORMIN (GLUCOPHAGE) 500 mg tablet (Taking/) Take 1 Tablet by mouth two (2) times daily (with meals) for 30 days.     glipiZIDE (GLUCOTROL) 5 mg tablet (Taking) Take 1 tablet by mouth twice daily    Januvia 50 mg tablet (Taking) Take 1 tablet by mouth once daily             Diabetes self-management practices:   Eating pattern                Eats 3 small meals daily  [x]         Breakfast                         2 Eggs, Coffee  [x]         Lunch                               [place]  [x]         Dinner                              \"Camb[employer]ian Food\" Bread, rice, lentils   [x]         Bedtime                           [place]  [x]         Snacks                              Afternoon snack  [x]         Beverages                        Water, Coffee  Physical activity pattern                Sedentary   Monitoring pattern  Discharged last week with a [place] CGM and serum glucometer  7 day average Ba-9a: 129-164  9a-12: 243  Noon to 9p: 198-238  1 low BG in the last week overnight    Taking medications pattern  [x]         Consistent administration  [x]         Affordable  Social determinants of health impacting diabetes self-management practices   Concerned that you need to know more about how to stay healthy with diabetes  Overall evaluation:                 [x]         Achieving A1c target with drug therapy & self-care practices    Subjective     Objective   Physical exam  General Underweight [place] ([facility]) male in critical condition in CVICU. Intubated. LVAD. CVVHD  Neuro  Eyes open today with SBT  Vital Signs Visit Vitals  /64   Pulse 69   Temp 98.5 °F (36.9 °C)   Resp 20   Ht 5' 6\" (1.676 m)   Wt 64 kg (141 lb 1.5 oz)   SpO2 100%   BMI 22.77 kg/m²     Skin  Warm and dry. No acanthosis noted along neckline. Sternal surgical incision. Chest tubes. LVAD  Heart   Regular rate and rhythm. No murmurs, rubs or gallops  Lungs  Clear to auscultation without rales or rhonchi  Extremities No foot wounds        Laboratory  Recent Labs     03/06/23  0313 03/06/23  0309 03/05/23  0509 03/04/23  0402   GLU  --  134* 201* 199*   AGAP  --  9 5 8   WBC 7.4  --  7.0 7.9   CREA  --  2.39* 1.64* 1.67*   AST  --  171* 162* 157*   ALT  --  86* 81* 78         Factors impacting BG management  Factor Dose Comments   Nutrition:  Standard meals     Enteral feeds  Vital 1.5 @ 40 ml/hr  165g CHO/24h      Heart Failure/Cardiogenic Shock HeartMate 3 LVAD  BNP 00118    Pancreatitis? Lactic acidosis Lac 2.1    Infection UTI     Other:   Kidney function TANNER on CKD:   Current GFR 46  CRRT stopped 3/5, transitioned to HD    Shock Liver LFTs- improving      Blood glucose pattern    Significant diabetes-related events over the past 24-72 hours  A1C 7.7% 1/11/23  Pre-op: Basal: Lantus 6 units daily and Bolus: 10 units Humalog/meal    Gtts: Epi off  NPH: 16 units Q12- weaning to 10 units Q12  Correction: 2 units in the last 24h  Off insulin gtt: -148  Failed extubation.   Juanito Trujillo likely next week  Epi to off  CRRT stopped    Assessment and Nursing Intervention   Nursing Diagnosis Risk for unstable blood glucose pattern   Nursing Intervention Domain 7180 Decision-making Support   Nursing Interventions Examined current inpatient diabetes/blood glucose control   Explored factors facilitating and impeding inpatient management  Explored corrective strategies with patient and responsible inpatient provider   Informed patient of rational for insulin strategy while hospitalized     Billing Code(s)   54161    Before making these care recommendations, I personally reviewed the hospitalization record, including notes, laboratory & diagnostic data and current medications, and examined the patient at the bedside (circumstances permitting) before determining care. More than fifty (50) percent of the time was spent in patient counseling and/or care coordination.   Total minutes: 25    SLICK Gibbons  Diabetes Clinical Nurse Specialist  [facility]  Access via [employer]

## 2023-03-06 NOTE — PROGRESS NOTES
Problem: Diabetes Self-Management  Goal: *Disease process and treatment process  Description: Define diabetes and identify own type of diabetes; list 3 options for treating diabetes. 3/6/2023 5808 by Alec Thornton RN  Outcome: Progressing Towards Goal  3/6/2023 9256 by Alec Thornton RN  Outcome: Progressing Towards Goal  Goal: *Incorporating nutritional management into lifestyle  Description: Describe effect of type, amount and timing of food on blood glucose; list 3 methods for planning meals. 3/6/2023 9136 by Alec Thornton RN  Outcome: Progressing Towards Goal  3/6/2023 7400 by Alec Thornton RN  Outcome: Progressing Towards Goal  Goal: *Incorporating physical activity into lifestyle  Description: State effect of exercise on blood glucose levels. 3/6/2023 2460 by Alec Thornton RN  Outcome: Progressing Towards Goal  3/6/2023 7778 by Alec Thornton RN  Outcome: Progressing Towards Goal  Goal: *Developing strategies to promote health/change behavior  Description: Define the ABC's of diabetes; identify appropriate screenings, schedule and personal plan for screenings. 3/6/2023 3810 by Alec Thornton RN  Outcome: Progressing Towards Goal  3/6/2023 5532 by Alec Thornton RN  Outcome: Progressing Towards Goal  Goal: *Using medications safely  Description: State effect of diabetes medications on diabetes; name diabetes medication taking, action and side effects. 3/6/2023 4957 by Alec Thornton RN  Outcome: Progressing Towards Goal  3/6/2023 2642 by Alec Thornton RN  Outcome: Progressing Towards Goal  Goal: *Monitoring blood glucose, interpreting and using results  Description: Identify recommended blood glucose targets  and personal targets.   3/6/2023 8349 by Alec Thornton RN  Outcome: Progressing Towards Goal  3/6/2023 4253 by Alec Thornton RN  Outcome: Progressing Towards Goal  Goal: *Prevention, detection, treatment of acute complications  Description: List symptoms of hyper- and hypoglycemia; describe how to treat low blood sugar and actions for lowering  high blood glucose level. 3/6/2023 7514 by Coleman Lance RN  Outcome: Progressing Towards Goal  3/6/2023 0566 by Coleman Lance RN  Outcome: Progressing Towards Goal  Goal: *Prevention, detection and treatment of chronic complications  Description: Define the natural course of diabetes and describe the relationship of blood glucose levels to long term complications of diabetes.   3/6/2023 5534 by Coleman Lance RN  Outcome: Progressing Towards Goal  3/6/2023 7335 by Coleman Lance RN  Outcome: Progressing Towards Goal  Goal: *Developing strategies to address psychosocial issues  Description: Describe feelings about living with diabetes; identify support needed and support network  3/6/2023 0614 by Coleman Lance RN  Outcome: Progressing Towards Goal  3/6/2023 0613 by Coleman Lance RN  Outcome: Progressing Towards Goal  Goal: *Insulin pump training  3/6/2023 0614 by Coleman Lance RN  Outcome: Progressing Towards Goal  3/6/2023 0613 by Coleman Lance RN  Outcome: Progressing Towards Goal  Goal: *Sick day guidelines  3/6/2023 0614 by Coleman Lance RN  Outcome: Progressing Towards Goal  3/6/2023 0613 by Coleman Lance RN  Outcome: Progressing Towards Goal  Goal: *Patient Specific Goal (EDIT GOAL, INSERT TEXT)  3/6/2023 6348 by Coleman Lance RN  Outcome: Progressing Towards Goal  3/6/2023 0613 by Coleman Lance RN  Outcome: Progressing Towards Goal     Problem: Patient Education: Go to Patient Education Activity  Goal: Patient/Family Education  3/6/2023 8169 by Coleman Lance RN  Outcome: Progressing Towards Goal  3/6/2023 1103 by Coleman Lance RN  Outcome: Progressing Towards Goal     Problem: Pressure Injury - Risk of  Goal: *Prevention of pressure injury  Description: Document Mike Scale and appropriate interventions in the flowsheet. 3/6/2023 7621 by Afshin Parker RN  Outcome: Progressing Towards Goal  Note: Pressure Injury Interventions:  Sensory Interventions: Assess changes in LOC, Float heels, Minimize linen layers    Moisture Interventions: Absorbent underpads    Activity Interventions: Assess need for specialty bed    Mobility Interventions: Assess need for specialty bed, Float heels    Nutrition Interventions: Document food/fluid/supplement intake    Friction and Shear Interventions: Apply protective barrier, creams and emollients, Minimize layers             3/6/2023 0613 by Afshin Parker RN  Outcome: Progressing Towards Goal  Note: Pressure Injury Interventions:  Sensory Interventions: Assess changes in LOC, Float heels, Minimize linen layers    Moisture Interventions: Absorbent underpads    Activity Interventions: Assess need for specialty bed    Mobility Interventions: Assess need for specialty bed, Float heels    Nutrition Interventions: Document food/fluid/supplement intake    Friction and Shear Interventions: Apply protective barrier, creams and emollients, Minimize layers                Problem: Patient Education: Go to Patient Education Activity  Goal: Patient/Family Education  3/6/2023 0614 by Afshin Parker RN  Outcome: Progressing Towards Goal  3/6/2023 0613 by Afshin Parker RN  Outcome: Progressing Towards Goal     Problem: Falls - Risk of  Goal: *Absence of Falls  Description: Document Laverne Fall Risk and appropriate interventions in the flowsheet.   3/6/2023 0159 by Afshin Parker RN  Outcome: Progressing Towards Goal  Note: Fall Risk Interventions:  Mobility Interventions: PT Consult for mobility concerns, PT Consult for assist device competence         Medication Interventions: Evaluate medications/consider consulting pharmacy, Patient to call before getting OOB, Teach patient to arise slowly    Elimination Interventions: Call light in reach, Stay With Me (per policy)    History of Falls Interventions: Door open when patient unattended, Evaluate medications/consider consulting pharmacy      3/6/2023 3117 by Deborah Hunter RN  Outcome: Progressing Towards Goal  Note: Fall Risk Interventions:  Mobility Interventions: PT Consult for mobility concerns, PT Consult for assist device competence         Medication Interventions: Evaluate medications/consider consulting pharmacy, Patient to call before getting OOB, Teach patient to arise slowly    Elimination Interventions: Call light in reach, Stay With Me (per policy)    History of Falls Interventions: Door open when patient unattended, Evaluate medications/consider consulting pharmacy         Problem: Patient Education: Go to Patient Education Activity  Goal: Patient/Family Education  3/6/2023 0614 by Deborah Hunter RN  Outcome: Progressing Towards Goal  3/6/2023 2342 by Deborah Hunter RN  Outcome: Progressing Towards Goal     Problem: Pain  Goal: *Control of Pain  3/6/2023 0614 by Deborah Hunter RN  Outcome: Progressing Towards Goal  3/6/2023 0613 by Deborah Hunter RN  Outcome: Progressing Towards Goal  Goal: *PALLIATIVE CARE:  Alleviation of Pain  3/6/2023 0614 by Deborah Hunter RN  Outcome: Progressing Towards Goal  3/6/2023 0613 by Deborah Hunter RN  Outcome: Progressing Towards Goal     Problem: Patient Education: Go to Patient Education Activity  Goal: Patient/Family Education  3/6/2023 9646 by Deborah Hunter RN  Outcome: Progressing Towards Goal  3/6/2023 7189 by Deborah Hunter RN  Outcome: Progressing Towards Goal     Problem: Patient Education: Go to Patient Education Activity  Goal: Patient/Family Education  3/6/2023 8934 by Deborah Hunter RN  Outcome: Progressing Towards Goal  3/6/2023 9270 by Deborah Hunter RN  Outcome: Progressing Towards Goal     Problem: Patient Education: Go to Patient Education Activity  Goal: Patient/Family Education  3/6/2023 1792 by Deborah Hunter RN  Outcome: Progressing Towards Goal  3/6/2023 5485 by Deborah Hunter RN  Outcome: Progressing Towards Goal     Problem: Nutrition Deficit  Goal: *Optimize nutritional status  3/6/2023 0614 by Deborah Hunter RN  Outcome: Progressing Towards Goal  3/6/2023 0613 by Deborah Hunter RN  Outcome: Progressing Towards Goal     Problem: Ventilator Management  Goal: *Adequate oxygenation and ventilation  3/6/2023 0614 by Deborah Hunter RN  Outcome: Progressing Towards Goal  3/6/2023 0613 by Deborah Hunter RN  Outcome: Progressing Towards Goal  Goal: *Patient maintains clear airway/free of aspiration  3/6/2023 0175 by Deborah Hunter RN  Outcome: Progressing Towards Goal  3/6/2023 0613 by Deborah Hunter RN  Outcome: Progressing Towards Goal  Goal: *Absence of infection signs and symptoms  3/6/2023 0614 by Deborah Hunter RN  Outcome: Progressing Towards Goal  3/6/2023 0613 by Deborah Hunter RN  Outcome: Progressing Towards Goal  Goal: *Normal spontaneous ventilation  3/6/2023 0614 by Deborah Hunter RN  Outcome: Progressing Towards Goal  3/6/2023 0613 by Deborah Hunter RN  Outcome: Progressing Towards Goal     Problem: Patient Education: Go to Patient Education Activity  Goal: Patient/Family Education  3/6/2023 6567 by Deborah Hunter RN  Outcome: Progressing Towards Goal  3/6/2023 9366 by Deborah Hunter RN  Outcome: Progressing Towards Goal     Problem: Patient Education: Go to Patient Education Activity  Goal: Patient/Family Education  3/6/2023 4606 by Deborah Hunter RN  Outcome: Progressing Towards Goal  3/6/2023 9074 by Deborah Hunter RN  Outcome: Progressing Towards Goal     Problem: LVAD Pre-op Period  Goal: Off Pathway (Use only if patient is Off Pathway)  3/6/2023 0614 by Emmie Roberts RN  Outcome: Progressing Towards Goal  3/6/2023 5070 by Emmie Roberts RN  Outcome: Progressing Towards Goal  Goal: Activity/Safety  3/6/2023 0614 by Emmie Roberts RN  Outcome: Progressing Towards Goal  3/6/2023 0613 by Emmie Roberts RN  Outcome: Progressing Towards Goal  Goal: Consults, if ordered  3/6/2023 0614 by Emmie Roberts RN  Outcome: Progressing Towards Goal  3/6/2023 0613 by Emmie Roberts RN  Outcome: Progressing Towards Goal  Goal: Diagnostic Test/Procedures  3/6/2023 0614 by Emmie Roberts RN  Outcome: Progressing Towards Goal  3/6/2023 0613 by Emmie Roberts RN  Outcome: Progressing Towards Goal  Goal: Nutrition/Diet  3/6/2023 0614 by Emmie Roberts RN  Outcome: Progressing Towards Goal  3/6/2023 0613 by Emmie Roberts RN  Outcome: Progressing Towards Goal  Goal: Medications  3/6/2023 0614 by Emmie Roberts RN  Outcome: Progressing Towards Goal  3/6/2023 7563 by Emmie Roberts RN  Outcome: Progressing Towards Goal  Goal: Treatments/Interventions/Procedures  3/6/2023 8108 by Emmie Roberts RN  Outcome: Progressing Towards Goal  3/6/2023 7270 by Emmie Roberts RN  Outcome: Progressing Towards Goal  Goal: Discharge Planning  3/6/2023 8826 by Emmie Roberts RN  Outcome: Progressing Towards Goal  3/6/2023 3645 by Emmie Roberts RN  Outcome: Progressing Towards Goal  Goal: Psychosocial  3/6/2023 0614 by Emmie Roberts RN  Outcome: Progressing Towards Goal  3/6/2023 1705 by Emmie Roberts RN  Outcome: Progressing Towards Goal  Goal: *Hemodynamically stable (eg: Cardiac output/index; pulmonary arterial pressures; mixed venous oxygen saturation within set parameters)  3/6/2023 0614 by Emmie Roberts RN  Outcome: Progressing Towards Goal  3/6/2023 8232 by Genny Closs, RN  Outcome: Progressing Towards Goal  Goal: *Labs/tests completed  3/6/2023 1664 by Genny Closs, RN  Outcome: Progressing Towards Goal  3/6/2023 6929 by Genny Closs, RN  Outcome: Progressing Towards Goal     Problem: LVAD Pre-Op Day  Goal: Off Pathway (Use only if patient is Off Pathway)  3/6/2023 0614 by Genny Closs, RN  Outcome: Progressing Towards Goal  3/6/2023 0613 by Genny Closs, RN  Outcome: Progressing Towards Goal  Goal: Activity/Safety  3/6/2023 0614 by Genny Closs, RN  Outcome: Progressing Towards Goal  3/6/2023 0613 by Genny Closs, RN  Outcome: Progressing Towards Goal  Goal: Consults, if ordered  3/6/2023 0614 by Genny Closs, RN  Outcome: Progressing Towards Goal  3/6/2023 0613 by Genny Closs, RN  Outcome: Progressing Towards Goal  Goal: Diagnostic Test/Procedures  3/6/2023 0614 by Genny Closs, RN  Outcome: Progressing Towards Goal  3/6/2023 0613 by Genny Closs, RN  Outcome: Progressing Towards Goal  Goal: Nutrition/Diet  3/6/2023 0614 by Genny Closs, RN  Outcome: Progressing Towards Goal  3/6/2023 0613 by Genny Closs, RN  Outcome: Progressing Towards Goal  Goal: Medications  3/6/2023 0614 by Genny Closs, RN  Outcome: Progressing Towards Goal  3/6/2023 0613 by Genny Closs, RN  Outcome: Progressing Towards Goal  Goal: Treatments/Interventions/Procedures  3/6/2023 0614 by Genny Closs, RN  Outcome: Progressing Towards Goal  3/6/2023 0613 by Genny Closs, RN  Outcome: Progressing Towards Goal  Goal: Discharge Planning  3/6/2023 5670 by Genny Closs, RN  Outcome: Progressing Towards Goal  3/6/2023 5246 by Genny Closs, RN  Outcome: Progressing Towards Goal  Goal: Psychosocial  3/6/2023 0614 by Genny Closs, RN  Outcome: Progressing Towards Goal  3/6/2023 2279 by Howie Marroquin Brooks Reynoso RN  Outcome: Progressing Towards Goal  Goal: *Vital signs within specified parameters  3/6/2023 0614 by Shaista Street RN  Outcome: Progressing Towards Goal  3/6/2023 0613 by Shaista Street RN  Outcome: Progressing Towards Goal  Goal: *Consent obtained, permits and diagnostics complete  3/6/2023 0614 by Shaista Street RN  Outcome: Progressing Towards Goal  3/6/2023 0613 by Shaista Street RN  Outcome: Progressing Towards Goal  Goal: *Confirmation of post discharge primary support person(s)  3/6/2023 0614 by Shaista Street RN  Outcome: Progressing Towards Goal  3/6/2023 0613 by Shaista Street RN  Outcome: Progressing Towards Goal     Problem: LVAD Day of Surgery (Initiate SCIP measure for post-op care)  Goal: Off Pathway (Use only if patient is Off Pathway)  3/6/2023 0614 by Shaista Street RN  Outcome: Progressing Towards Goal  3/6/2023 0613 by Shaista Street RN  Outcome: Progressing Towards Goal  Goal: Activity/Safety  3/6/2023 0614 by Shaista Street RN  Outcome: Progressing Towards Goal  3/6/2023 0613 by Shaista Street RN  Outcome: Progressing Towards Goal  Goal: Consults, if ordered  3/6/2023 0614 by Shaista Street RN  Outcome: Progressing Towards Goal  3/6/2023 0613 by Shaista Street RN  Outcome: Progressing Towards Goal  Goal: Diagnostic Test/Procedures  3/6/2023 0614 by Shaista Street RN  Outcome: Progressing Towards Goal  3/6/2023 0613 by Shaista Street RN  Outcome: Progressing Towards Goal  Goal: Nutrition/Diet  3/6/2023 0614 by Shaista Street RN  Outcome: Progressing Towards Goal  3/6/2023 0613 by Shaista Street RN  Outcome: Progressing Towards Goal  Goal: Medications  3/6/2023 0614 by Shaitsa Street RN  Outcome: Progressing Towards Goal  3/6/2023 0613 by Shaista Street RN  Outcome: Progressing Towards Goal  Goal: Respiratory  3/6/2023 5711 by Alia Magdaleno, RN  Outcome: Progressing Towards Goal  3/6/2023 4772 by Alia Magdaleno RN  Outcome: Progressing Towards Goal  Goal: Treatments/Interventions/Procedures  3/6/2023 0614 by Alia Magdaleno RN  Outcome: Progressing Towards Goal  3/6/2023 6697 by Alia Magdaleno RN  Outcome: Progressing Towards Goal  Goal: Psychosocial  3/6/2023 0614 by Alia Magdaleno RN  Outcome: Progressing Towards Goal  3/6/2023 2574 by Alia Magdaleno, RN  Outcome: Progressing Towards Goal  Goal: *Hemodynamically stable (eg: Cardiac output/index; pulmonary arterial pressures; mixed venous oxygen saturation within set parameters)  3/6/2023 0614 by Alia Magdaleno RN  Outcome: Progressing Towards Goal  3/6/2023 0613 by Alia Magdaleno RN  Outcome: Progressing Towards Goal  Goal: *Lungs clear or at baseline  3/6/2023 0614 by Alia Magdaelno RN  Outcome: Progressing Towards Goal  3/6/2023 0613 by Alia Magdaleno RN  Outcome: Progressing Towards Goal  Goal: *Stable cardiac rhythm  3/6/2023 0614 by Alia Magdaleno RN  Outcome: Progressing Towards Goal  3/6/2023 0613 by Alia Magdaleno RN  Outcome: Progressing Towards Goal  Goal: *Follows simple commands post anesthesia  3/6/2023 0614 by Alia Magdaleno, RN  Outcome: Progressing Towards Goal  3/6/2023 0613 by Alia Magdaleno RN  Outcome: Progressing Towards Goal  Goal: *Optimal pain control at patient's stated goal  3/6/2023 0632 by Alia Magdaleno RN  Outcome: Progressing Towards Goal  3/6/2023 0613 by Alia Magdaleno RN  Outcome: Progressing Towards Goal  Goal: *LVAD parameters within set limits  3/6/2023 0614 by Alia Magdaleno, RN  Outcome: Progressing Towards Goal  3/6/2023 0613 by Alia Magdaleno RN  Outcome: Progressing Towards Goal     Problem: LVAD Post-op Day 1  Goal: Off Pathway (Use only if patient is Off Pathway)  Outcome: Progressing Towards Goal  Goal: Activity/Safety  Outcome: Progressing Towards Goal  Goal: Consults, if ordered  Outcome: Progressing Towards Goal  Goal: Diagnostic Test/Procedures  Outcome: Progressing Towards Goal  Goal: Nutrition/Diet  Outcome: Progressing Towards Goal  Goal: Medications  Outcome: Progressing Towards Goal  Goal: Respiratory  Outcome: Progressing Towards Goal  Goal: Treatments/Interventions/Procedures  Outcome: Progressing Towards Goal  Goal: Psychosocial  Outcome: Progressing Towards Goal  Goal: *Hemodynamically stable without vasoactive medications  Outcome: Progressing Towards Goal  Goal: *Lungs clear or at baseline  Outcome: Progressing Towards Goal  Goal: *Adequate oxygenation  Outcome: Progressing Towards Goal  Goal: *Mechanical ventilation discontinued  Outcome: Progressing Towards Goal  Goal: *Optimal pain control at patient's stated goal  Outcome: Progressing Towards Goal  Goal: *Demonstrates progressive activity  Outcome: Progressing Towards Goal  Goal: *Tolerating diet  Outcome: Progressing Towards Goal  Goal: *Level of consciousness returns to baseline  Outcome: Progressing Towards Goal  Goal: *LVAD parameters within set limits  Outcome: Progressing Towards Goal     Problem: LVAD Post-op Day 2  Goal: Off Pathway (Use only if patient is Off Pathway)  Outcome: Progressing Towards Goal  Goal: Activity/Safety  Outcome: Progressing Towards Goal  Goal: Consults, if ordered  Outcome: Progressing Towards Goal  Goal: Diagnostic Test/Procedures  Outcome: Progressing Towards Goal  Goal: Nutrition/Diet  Outcome: Progressing Towards Goal  Goal: Medications  Outcome: Progressing Towards Goal  Goal: Discharge Planning  Outcome: Progressing Towards Goal  Goal: Respiratory  Outcome: Progressing Towards Goal  Goal: Treatments/Interventions/Procedures  Outcome: Progressing Towards Goal  Goal: Psychosocial  Outcome: Progressing Towards Goal  Goal: *Hemodynamically stable without vasoactive medications  Outcome: Progressing Towards Goal  Goal: *Lungs clear or at baseline  Outcome: Progressing Towards Goal  Goal: *Adequate oxygenation  Outcome: Progressing Towards Goal  Goal: *Optimal pain control at patient's stated goal  Outcome: Progressing Towards Goal  Goal: *Demonstrates progressive activity  Outcome: Progressing Towards Goal  Goal: *Tolerating diet  Outcome: Progressing Towards Goal  Goal: *LVAD parameters within set limits  Outcome: Progressing Towards Goal     Problem: LVAD Post-op Day 3/Transfer Day  Goal: Off Pathway (Use only if patient is Off Pathway)  Outcome: Progressing Towards Goal  Goal: Activity/Safety  Outcome: Progressing Towards Goal  Goal: Consults, if ordered  Outcome: Progressing Towards Goal  Goal: Diagnostic Test/Procedures  Outcome: Progressing Towards Goal  Goal: Nutrition/Diet  Outcome: Progressing Towards Goal  Goal: Discharge Planning  Outcome: Progressing Towards Goal  Goal: Medications  Outcome: Progressing Towards Goal  Goal: Respiratory  Outcome: Progressing Towards Goal  Goal: Treatments/Interventions/Procedures  Outcome: Progressing Towards Goal  Goal: Psychosocial  Outcome: Progressing Towards Goal  Goal: *Hemodynamically stable  Outcome: Progressing Towards Goal  Goal: *Lungs clear or at baseline  Outcome: Progressing Towards Goal  Goal: *Adequate oxygenation  Outcome: Progressing Towards Goal  Goal: *Optimal pain control at patient's stated goal  Outcome: Progressing Towards Goal  Goal: *Incisions without signs and symptoms of wound complication  Outcome: Progressing Towards Goal  Goal: *Tolerating diet  Outcome: Progressing Towards Goal  Goal: *Demonstrates progressive activity  Outcome: Progressing Towards Goal     Problem: LVAD Post-op Day 4/Transfer Day  Goal: Off Pathway (Use only if patient is Off Pathway)  Outcome: Progressing Towards Goal  Goal: Activity/Safety  Outcome: Progressing Towards Goal  Goal: Diagnostic Test/Procedures  Outcome: Progressing Towards Goal  Goal: Nutrition/Diet  Outcome: Progressing Towards Goal  Goal: Medications  Outcome: Progressing Towards Goal  Goal: Discharge Planning  Outcome: Progressing Towards Goal  Goal: Respiratory  Outcome: Progressing Towards Goal  Goal: Treatments/Interventions/Procedures  Outcome: Progressing Towards Goal  Goal: Psychosocial  Outcome: Progressing Towards Goal  Goal: *Hemodynamically stable  Outcome: Progressing Towards Goal  Goal: *Lungs clear or at baseline  Outcome: Progressing Towards Goal  Goal: *Adequate oxygenation  Outcome: Progressing Towards Goal  Goal: *Optimal pain control at patient's stated goal  Outcome: Progressing Towards Goal  Goal: *Incisions without signs and symptoms of wound complication  Outcome: Progressing Towards Goal  Goal: *Tolerating diet  Outcome: Progressing Towards Goal  Goal: *Demonstrates progressive activity  Outcome: Progressing Towards Goal     Problem: LVAD Post-op 5 to 7 Days  Goal: Off Pathway (Use only if patient is Off Pathway)  Outcome: Progressing Towards Goal  Goal: Activity/Safety  Outcome: Progressing Towards Goal  Goal: Consults, if ordered  Outcome: Progressing Towards Goal  Goal: Diagnostic Test/Procedures  Outcome: Progressing Towards Goal  Goal: Nutrition/Diet  Outcome: Progressing Towards Goal  Goal: Medications  Outcome: Progressing Towards Goal  Goal: Discharge Planning  Outcome: Progressing Towards Goal  Goal: Respiratory  Outcome: Progressing Towards Goal  Goal: Treatments/Interventions/Procedures  Outcome: Progressing Towards Goal  Goal: Psychosocial  Outcome: Progressing Towards Goal  Goal: *Hemodynamically stable  Outcome: Progressing Towards Goal  Goal: *Lungs clear or at baseline  Outcome: Progressing Towards Goal  Goal: *Adequate oxygenation  Outcome: Progressing Towards Goal  Goal: *Optimal pain control at patient's stated goal  Outcome: Progressing Towards Goal  Goal: *Incisions without signs and symptoms of wound complication  Outcome: Progressing Towards Goal  Goal: *Tolerating diet  Outcome: Progressing Towards Goal  Goal: *Demonstrates progressive activity  Outcome: Progressing Towards Goal     Problem: LVAD Post-op Day 8 to day 14  Goal: Off Pathway (Use only if patient is Off Pathway)  Outcome: Progressing Towards Goal  Goal: Activity/Safety  Outcome: Progressing Towards Goal  Goal: Consults, if ordered  Outcome: Progressing Towards Goal  Goal: Diagnostic Test/Procedures  Outcome: Progressing Towards Goal  Goal: Nutrition/Diet  Outcome: Progressing Towards Goal  Goal: Medications  Outcome: Progressing Towards Goal  Goal: Discharge Planning  Outcome: Progressing Towards Goal  Goal: Respiratory  Outcome: Progressing Towards Goal  Goal: Treatments/Interventions/Procedures  Outcome: Progressing Towards Goal  Goal: Psychosocial  Outcome: Progressing Towards Goal  Goal: *Hemodynamically stable  Outcome: Progressing Towards Goal  Goal: *Lungs clear or at baseline  Outcome: Progressing Towards Goal  Goal: *Adequate oxygenation  Outcome: Progressing Towards Goal  Goal: *Optimal pain control at patient's stated goal  Outcome: Progressing Towards Goal  Goal: *Incisions without signs and symptoms of wound complication  Outcome: Progressing Towards Goal  Goal: *Tolerating diet  Outcome: Progressing Towards Goal  Goal: *Demonstrates progressive activity  Outcome: Progressing Towards Goal     Problem: LVAD Post-op Day 15 to Discharge  Goal: Off Pathway (Use only if patient is Off Pathway)  Outcome: Progressing Towards Goal  Goal: Activity/Safety  Outcome: Progressing Towards Goal  Goal: Consults, if ordered  Outcome: Progressing Towards Goal  Goal: Diagnostic Test/Procedures  Outcome: Progressing Towards Goal  Goal: Nutrition/Diet  Outcome: Progressing Towards Goal  Goal: Medications  Outcome: Progressing Towards Goal  Goal: Discharge Planning  Outcome: Progressing Towards Goal  Goal: Respiratory  Outcome: Progressing Towards Goal  Goal: Treatments/Interventions/Procedures  Outcome: Progressing Towards Goal  Goal: Psychosocial  Outcome: Progressing Towards Goal     Problem: LVAD: Discharge Outcomes  Goal: *Hemodynamically stable  Outcome: Progressing Towards Goal  Goal: *Lungs clear or at baseline  Outcome: Progressing Towards Goal  Goal: *Optimal pain control at patient's stated goal  Outcome: Progressing Towards Goal  Goal: *Demonstrates progressive activity  Outcome: Progressing Towards Goal  Goal: *Tolerating diet  Outcome: Progressing Towards Goal  Goal: *LVAD parameters within set limits  Outcome: Progressing Towards Goal  Goal: *Verbalizes and demonstrates incision care  Outcome: Progressing Towards Goal  Goal: *Demonstrates effective coping  Outcome: Progressing Towards Goal  Goal: *Patient/caregiver is able to perform drive line dressing change independently  Outcome: Progressing Towards Goal  Goal: *LVAD drive line site without signs and symptoms of wound complications  Outcome: Progressing Towards Goal  Goal: *LVAD skills board complete  Outcome: Progressing Towards Goal     Problem: Nutrition Deficit  Goal: *Optimize nutritional status  3/6/2023 0614 by Amado Melara RN  Outcome: Progressing Towards Goal  3/6/2023 0613 by Amado Melara RN  Outcome: Progressing Towards Goal

## 2023-03-06 NOTE — PROGRESS NOTES
2000- report received from [facility] RN all care assumed    2100-  bg 100. Order for NPH 16 units. Per Dr. Fatou Antonio give 12 units. 0800- Bedside and Verbal shift change report given to Lexie SANDOVAL (oncoming nurse) by Jennifer Villegas (offgoing nurse). Report included the following information SBAR, Intake/Output, MAR, Recent Results, Med Rec Status, Cardiac Rhythm NSR, and Dual Neuro Assessment.

## 2023-03-06 NOTE — PROGRESS NOTES
1300: Report received from [facility]. Nubia dual verified. Patient care assumed. 1400: CT removed by Jerrod Caldwell NP.     1600: Bedside and Verbal shift change report given to Annika Summers RN (oncoming nurse) by Jonh Zaldivar RN (offgoing nurse). Report included the following information SBAR, Kardex, OR Summary, Intake/Output, MAR, Recent Results, Cardiac Rhythm NSR, and Alarm Parameters .

## 2023-03-07 NOTE — PROGRESS NOTES
Name: Lindy Vincent   MRN: [id number]  : 1950      Assessment:  TANNER on CKD-3a: Suspect cardiorenal effects with needed diuresis. Now has LVAD and  POST OP ATN. Anuric->Requiring CRRT  hypercalcemia/immobilization- also has elevated PTH; may have component of 1ry vs secondary HPT  Possible pancreatitis- with elevated lipase; Hypercalcemia can contribute  Ischemic CM: Recurrent exacerbations. EF 20%. S/p LVAD on 2/15. Required Impella RP for RV support-> attempts to wean not tolerated by patient. CVA  Sepsis  BPH   Well differentiated NET Grade 1: noted on EGD 23  Anemia 2 to CKD:  s/p PRBCS    Left UE basilic and radial vein thrombus  Thrombocytopenia     Was on CVVH. Did iHD on 3/6. Ca better. CVP little higher today. Plan/Recommendations:  Repeat HD today- 3 hrs, low Ca bath, UF of 1 Kg as leatha  Ct Sensipar 30 mg via NGT every day (to be given as suspension)  Redose Calcitonin for 1 more day today (could only be ordered for 24 hrs at a time)  Will hold off on IV Zometa or Pamidronate as last resort  Fluid mgt as per CHF team   BRIGHT  PRBC as per cardiac team    D/W  Dr Bernard Joiner   D/W Jayden Newton. HD orders placed    Subjective:   Intubated;     ROS:   UTO    Exam:  Visit Vitals  /71   Pulse 73   Temp 98.9 °F (37.2 °C)   Resp 21   Ht 5' 6\" (1.676 m)   Wt 66.1 kg (145 lb 11.6 oz)   SpO2 100%   BMI 23.52 kg/m²     NAD  +icterus  +ETT  +edema      Current Facility-Administered Medications   Medication Dose Route Frequency Last Admin    insulin NPH (NOVOLIN N, HUMULIN N) injection 10 Units  10 Units SubCUTAneous Q12H 10 Units at 23 0825    cinacalcet (SENSIPAR) tablet 30 mg  30 mg Oral DAILY WITH BREAKFAST 30 mg at 23 0825    albumin human 25% (BUMINATE) solution 25 g  25 g IntraVENous DIALYSIS PRN      insulin lispro (HUMALOG) injection SubCUTAneous Q6H      0.9% sodium chloride infusion 250 mL  250 mL IntraVENous CONTINUOUS 250 mL at 03/05/23 0926    hydrALAZINE (APRESOLINE) 20 mg/mL injection 5 mg  5 mg IntraVENous Q6H PRN      hydrALAZINE (APRESOLINE) tablet 5 mg  5 mg Oral PRN      niCARdipine (CARDENE) 25 mg in 0.9% sodium chloride 250 mL (Clvd1Dvl)  0-15 mg/hr IntraVENous TITRATE Stopped at 03/05/23 1925    aspirin chewable tablet 81 mg  81 mg Per NG tube DAILY 81 mg at 03/07/23 0825    levETIRAcetam (KEPPRA) injection 500 mg  500 mg IntraVENous Q12H 500 mg at 03/07/23 0113    bicarbonate dialysis (PRISMASOL) BG K 4/Ca 2.5 5000 ml solution   Extracorporeal DIALYSIS CONTINUOUS New Bag at 03/05/23 1106    epoetin heidy-epbx (RETACRIT) injection 10,000 Units  10,000 Units SubCUTAneous Q TUE, THU & SAT 10,000 Units at 03/04/23 2050    EPINEPHrine (ADRENALIN) 10 mg in 0.9% sodium chloride 250 mL infusion  0-10 mcg/min IntraVENous TITRATE Stopped at 03/05/23 0920    propofol (DIPRIVAN) 10 mg/mL infusion  0-50 mcg/kg/min IntraVENous TITRATE Stopped at 03/02/23 1628    HYDROmorphone 30 mg/30 mL (1 mg/mL) infusion  0.5-1 mg/hr IntraVENous CONTINUOUS 0.5 mg/hr at 03/07/23 0918    dextrose (D50W) injection syrg 25 g  25 g IntraVENous PRN 9 mL at 02/25/23 1225    neomycin-polymyxin-dexamethasone (DEXACINE) 3.5 mg/g-10,000 unit/g-0.1 % ophthalmic ointment   Both Eyes BID Given at 03/07/23 0826    NOREPINephrine (LEVOPHED) 8 mg in 5% dextrose 250mL (32 mcg/mL) infusion  0.5-16 mcg/min IntraVENous TITRATE 1 mcg/min at 03/07/23 0755    colchicine tablet 0.6 mg  0.6 mg Oral DAILY 0.6 mg at 03/07/23 0825    fluconazole (DIFLUCAN) 400mg/200 mL IVPB (premix)  400 mg IntraVENous Q24H 400 mg at 03/07/23 1019    piperacillin-tazobactam (ZOSYN) 3.375 g in 0.9% sodium chloride (MBP/ADV) 100 mL MBP  3.375 g IntraVENous Q8H 3.375 g at 03/07/23 0558    heparin (porcine) 1,000 unit/mL injection 1,700 Units  1,700 Units InterCATHeter DIALYSIS PRN 1,700 Units at 03/06/23 1853    And    heparin (porcine) 1,000 unit/mL injection 1,500 Units  1,500 Units InterCATHeter DIALYSIS PRN 1,500 Units at 03/06/23 1852    balsam peru-castor oiL (VENELEX) ointment   Topical BID Given at 03/07/23 0826    acetaminophen (TYLENOL) solution 650 mg  650 mg Oral Q4H  mg at 02/20/23 0401    acetaminophen (TYLENOL) suppository 650 mg  650 mg Rectal Q4H  mg at 02/17/23 2013    HYDROmorphone (DILAUDID) injection 0.5 mg  0.5 mg IntraVENous Q3H PRN 0.5 mg at 02/22/23 2225    HYDROmorphone (DILAUDID) injection 1 mg  1 mg IntraVENous Q4H PRN 1 mg at 02/28/23 0557    heparin 25,000 units in D5W 250 ml infusion  12-25 Units/kg/hr IntraVENous TITRATE Stopped at 03/07/23 1000    albumin human 25% (BUMINATE) solution 12.5 g  12.5 g IntraVENous Q6H 12.5 g at 03/07/23 0558    bumetanide (BUMEX) injection 0.5 mg  0.5 mg IntraVENous Q4H PRN 0.5 mg at 02/17/23 1431    glucose chewable tablet 16 g  4 Tablet Oral PRN      glucagon (GLUCAGEN) injection 1 mg  1 mg IntraMUSCular PRN      sodium chloride (NS) flush 5-40 mL  5-40 mL IntraVENous Q8H 10 mL at 03/07/23 0559    sodium chloride (NS) flush 5-40 mL  5-40 mL IntraVENous PRN 40 mL at 02/17/23 1818    naloxone (NARCAN) injection 0.4 mg  0.4 mg IntraVENous PRN      ELECTROLYTE REPLACEMENT NOTE: Nurse to review Serum Potassium and Magnesuim levels and Initiate Electrolyte Replacement Protocol as needed  1 Each Other PRN      dextrose 10 % infusion 0-250 mL  0-250 mL IntraVENous PRN 75 mL at 02/24/23 0220    acetaminophen (TYLENOL) tablet 650 mg  650 mg Oral Q6H  mg at 02/17/23 0200    ondansetron (ZOFRAN) injection 4 mg  4 mg IntraVENous Q4H PRN      albuterol-ipratropium (DUO-NEB) 2.5 MG-0.5 MG/3 ML  3 mL Nebulization Q6H PRN      senna-docusate (PERICOLACE) 8.6-50 mg per tablet 1 Tablet  1 Tablet Oral DAILY 1 Tablet at 03/03/23 0856    polyethylene glycol (MIRALAX) packet 17 g  17 g Oral DAILY 17 g at 03/03/23 0856    bisacodyL (DULCOLAX) suppository 10 mg  10 mg Rectal DAILY PRN 10 mg at 02/22/23 0839    0.45% sodium chloride infusion  10 mL/hr IntraVENous CONTINUOUS 10 mL/hr at 03/05/23 0751    DOBUTamine (DOBUTREX) 500 mg/250 mL (2,000 mcg/mL) infusion  0-10 mcg/kg/min IntraVENous TITRATE Stopped at 02/23/23 1044    dexmedeTOMidine in 0.9 % NaCl (PRECEDEX) 400 mcg/100 mL (4 mcg/mL) infusion soln  0.1-1.5 mcg/kg/hr IntraVENous TITRATE 0.6 mcg/kg/hr at 03/07/23 0754    0.9% sodium chloride infusion  9 mL/hr IntraVENous CONTINUOUS 9 mL/hr at 03/07/23 0754       Labs/Data:    Lab Results   Component Value Date/Time    WBC 7.4 03/07/2023 03:46 AM    HGB 8.6 (L) 03/07/2023 03:46 AM    HCT 27.1 (L) 03/07/2023 03:46 AM    PLATELET 336 02/61/1641 03:46 AM    .0 (H) 03/07/2023 03:46 AM       Lab Results   Component Value Date/Time    Sodium 136 03/07/2023 03:42 AM    Potassium 4.5 03/07/2023 03:42 AM    Chloride 105 03/07/2023 03:42 AM    CO2 21 03/07/2023 03:42 AM    Anion gap 10 03/07/2023 03:42 AM    Glucose 165 (H) 03/07/2023 03:42 AM    BUN 30 (H) 03/07/2023 03:42 AM    Creatinine 2.32 (H) 03/07/2023 03:42 AM    BUN/Creatinine ratio 13 03/07/2023 03:42 AM    GFR est AA 46 (L) 06/23/2022 09:40 AM    GFR est non-AA 38 (L) 06/23/2022 09:40 AM    eGFR 29 (L) 03/07/2023 03:42 AM    eGFR 69 01/25/2023 11:55 AM    Calcium 10.9 (H) 03/07/2023 03:42 AM       Wt Readings from Last 3 Encounters:   03/07/23 66.1 kg (145 lb 11.6 oz)   01/25/23 55.8 kg (123 lb)   01/23/23 54.9 kg (121 lb)         Intake/Output Summary (Last 24 hours) at 3/7/2023 1047  Last data filed at 3/7/2023 1000  Gross per 24 hour   Intake 1482.91 ml   Output 218 ml   Net 1264.91 ml         Patient seen and examined. Chart reviewed. Labs, data and other pertinent notes reviewed in last 24 hrs.     PMH/SH/FH reviewed and unchanged compared to H&P      Gael Van MD

## 2023-03-07 NOTE — PROGRESS NOTES
2000- [facility]. All care assumed    0800-Bedside and Verbal shift change report given to [address] (oncoming nurse) by Gladis Paul (offgoing nurse). Report included the following information SBAR, Intake/Output, MAR, Recent Results, Med Rec Status, and Cardiac Rhythm NSR .

## 2023-03-07 NOTE — PROCEDURES
Tracheostomy Procedure Note    Performed by: Dr Cyndi Crowder    Bronchoscopy by:    Time Start: 9832  Time Finish: 6235  Indication: Acute hypoxic respiratory failure, extubation failure  Anesthesia: Procedural sedation with Versed, Fentanyl. See [facility]    Consent: On file    The risks, benefits, complications, treatment options and expected outcomes were discussed with the patient's family. The possibilities of reaction to medication, pulmonary aspiration, perforation of a viscus, bleeding, failure to diagnose a condition and creating a complication requiring transfusion or operation were discussed with the patient's family  who freely signed the consent. Procedure in detail:The patient was identified as Jem Brain and the procedure verified as Tracheostomy. A Time Out was held and the above information confirmed. Anatomy palpated and confirmed to be amenable for PDT placement. Local anesthesia instilled to site. 2cm veritcal incision made at midline of lower neck. Blunt dissection of tissue performed until cricoid cartilage, 2nd and 3rd tracheal rings readily palpated. With the bronchoscope in the ET tube, the ET tube was retracted slowly 1cm at a time until transillumination of bronchoscope and confirmation with gentle external probing. Introducer needle advanced into the trachea between 2nd and 3rd tracheal rings. Introducer catheter was then advanced into trachea under visualization and needle retracted. Guidewire passed through the catheter, and the catheter was removed. Graduated dilators were placed over the wire, and then a 8.0 Cuffed Shiley evac tracheostomy tube placed over the guidewire into the trachea. Bronchoscope was passed through the tracheostomy tube and shade visualized. Mechanical ventilation resumed through tracheostomy tube. Tracheostomy tube secured with sutures x4 and trach ties.     Findings: 8.0 Cuffed Shiley evac tracheostomy tube placed    Can re-start heparin 2hrs post procedure    Sutures to remain in place for at least 10 days    Complications: none    CXR ordered for follow up. Walker Rod MD  Staff [facility]

## 2023-03-07 NOTE — PROCEDURES
PROCEDURE: Fiberoptic bronchoscopy   INDICATION: performed in support of tracheostomy tube placement by Dr Emma Avery: Obtained in conjunction with consent for trach tube placement  PROCEDURE DESCRIPTION:   Patient was already sedated on mechanical ventilation. Neuromuscular blockade was administered. The bronchoscope was inserted via endotracheal tube and a quick airway exam was performed. This revealed Mild mucoid secretions. The scope was then retracted to the distal end of the ETT. Under direct visualization, the ETT was retracted to the level of the cords. Dr Nimco Cruz performed percutaneous trach tube placement (see separate procedure note) under direct visualization. There were no observed complications. There was no injury to the posterior tracheal wall. After placement of the tracheostomy tube, the ventilator was connected to the tracheostomy tube. An adaptor was placed on the trach tube and the scope was advanced for an additional airway exam. The tube was well positioned. There was no significant bleeding noted. Mucoid secretions were suctioned from dependent lung segments and the scope was removed.     Ana Gore, [address] [facility]  [phone]  3/7/2023 3:27 PM

## 2023-03-07 NOTE — PROGRESS NOTES
CRITICAL CARE NOTE      Name: Minna Acosta   : 1950   MRN: [id number]   Date: 3/6/2023      Reason for ICU Admission: Acute on chronic HFrEF     ICU PROBLEM LIST   Acute on chronic HFrEF (20%) NYHA IIIb/IV (D) on home inotropic support, life vest s/p LVAD  TANNER on CKD3, on CRRT  CHB post op, resolved  Aflutter w/ RVR  Acute on chronic hypoxemic respiratory failure  CAP  CAD  Gastric neuroendocrine tumor  Hx of LUE DVT  DM  PAD  TRISTAN  BPH w/ urinary retention requiring chronic donnelly    HISTORY OF PRESENT ILLNESS:   Pt is a 71 y.o M w/ PMH as noted above admitted to [facility] on  due to ongoing symptoms secondary to his HFrEF. Treated for possible CAP, and diuresed for acute on chronic HFrEF. Nephrology following for TANNER on CKD 3. AHF team recommended transfer to CVICU for [facility] placement and ongoing LVAD workup, with placement 2/15. Pt extubated , however with development of worsening renal dysfunction overnight and unable to tolerate CRRT so was reintubated and taken for Impella RP placement for right-sided support in a.m. of  and CRRT resumed. Impella removed on 3/1. Overnight 3/1-3/1 CVA noted with SAH.  3/2 SAH enlarged. Held anticoagulation. Failed extubation 3/2    24 HOUR EVENTS:   No acute overnight events, remains intubated, sedated. Off pressor support, transitioned to iHD this PM. Chest tubes removed. Heparin re-started. Further discussions with family on phone and at bedside regarding trach placement. Consent obtained with plan for bedside trach placement this week    NEUROLOGICAL:    Precedex, Dilaudid gtt  Goal RASS 0 to -1  Repeat CT with resolving SAH  RASHAD  Delirium precautions    PULMONOLOGY:   Lung protective ventilation, on minimal vent support at this time  Trach discussion as per above  Monitor blood gas    CARDIOVASCULAR:   On dose epi, wean to off as tolerated  Aneesh echo in a.m.   NICOM monitoring  LVAD  3L @ 4700 RPM  CVP goal 10-12  C/w ASA, statin  Goal euvolemia  Unable to tolerate BB, ARNI/ARB due to hypotension, aldactone held 2/2 elevated K     GASTROINTESTINAL:   NPO, on tube feeds  PPI   Trend LFTs  Tbili downtrend at 12, however remains elevated  ? Pancreatitis, possibly 2/2 hyper Ca, will be started on calcitonin and sensipar    RENAL/ELECTROLYTE/FLUIDS:   Strict I/Os,  goal  euvolemia  Stop CRRT, assess iHD vs CRRT in AM  Monitor for renal recovery  Nephrology following  Monitor RFP  Mg/Phos/K >2/3/4  Vasquez to remain in place    ENDOCRINE:    SSI, Basal insulin   Hypoglycemic protocol  Glucose goal 120-180    HEMATOLOGY/ONCOLOGY:   Resume heparin  Hemoglobin goal greater than 7, platelet goal greater than 50  EPO weekly  Gastric neuroendocrine tumor, well differentiated, good prognosis per onc.      ID/MICRO:   ID consulted, stop vanc/flagyl/fluconazole  C/w zosyn  Bcx without growth at this time  Urine with Candida       ICU DAILY CHECKLIST     Code Status:Full  DVT Prophylaxis:heparin  T/L/D: ETT,  PIV,  Vasquez,  V  wire, LVAD drive line  SUP: PPI  Diet: NPO  Activity Level:ad jose f  ABCDEF Bundle/Checklist Completed:Yes  Disposition: Stay in ICU  Multidisciplinary Rounds Completed:  yes  Patient/Family Updated: Yes    [facility]   2/3 Transferred to CVICU for [facility] placement  2/7 started on dobutamine as adjunct to milrinone  2/15 LVAD implant  2/16 extubated  2/18 Impella RP placement  3/1 Impella removed  3/1-2 SAH on CT  3/3 RE-intubated    SUBJECTIVE:     As noted above    Review of Systems:     Unable to perform due to patient intubated    OBJECTIVE:     Labs and Data: Reviewed 03/06/23  Medications: Reviewed 03/06/23  Imaging: Reviewed 03/06/23    General - sedated, intubated chronically ill appearing  HEENT- pupils equal, nystagmus, anicteric  Neuro - sedated, no focal deficit  CV - Paced,  VAD hum, post sternotomy, valentin  x5  Resp - rales b/l, NC  Abd - soft, distended, nontender, no guarding  MSK- intact, no sacral ulcer  LVAD driveline in left chest    Visit Vitals  /64   Pulse 84   Temp 97.9 °F (36.6 °C)   Resp 18   Ht 5' 6\" (1.676 m)   Wt 64 kg (141 lb 1.5 oz)   SpO2 100%   BMI 22.77 kg/m²    O2 Flow Rate (L/min): 20 l/min O2 Device: Endotracheal tube, Ventilator Temp (24hrs), Av.3 °F (36.8 °C), Min:97.9 °F (36.6 °C), Max:98.6 °F (37 °C)    CVP (mmHg): 4 mmHg (23 1700)      Intake/Output:     Intake/Output Summary (Last 24 hours) at 3/6/2023 1903  Last data filed at 3/6/2023 1300  Gross per 24 hour   Intake 1647.84 ml   Output 50 ml   Net 1597.84 ml       Imaging    23    ECHO ADULT FOLLOW-UP OR LIMITED 2023    Interpretation Summary    Left Ventricle: EF by visual approximation is 20%. Left ventricle is mildly dilated. Mitral Valve: Moderately thickened leaflet, at the anterior and posterior leaflets. Moderately calcified leaflet. Moderate regurgitation. Left Atrium: Left atrium is moderately dilated. Technical qualifiers: Echo study was technically difficult with poor endocardial visualization. Contrast used: Definity. Limited study, not all structures viewed    Signed by: Estefani Page MD on 2023  4:39 PM       Pertinent imaging reviewed and interpreted as noted above    CRITICAL CARE DOCUMENTATION  I had a face to face encounter with the patient, reviewed and interpreted patient data including clinical events, labs, images, vital signs, I/O's, and examined patient. I have discussed the case and the plan and management of the patient's care with the consulting services, the bedside nurses and the respiratory therapist.      NOTE OF PERSONAL INVOLVEMENT IN CARE   This patient has a high probability of imminent, clinically significant deterioration, which requires the highest level of preparedness to intervene urgently.  I participated in the decision-making and personally managed or directed the management of the following life and organ supporting interventions that required my frequent assessment to treat or prevent imminent deterioration. I personally spent 35 minutes of critical care time. This is time spent at this critically ill patient's bedside actively involved in patient care as well as the coordination of care. This does not include any procedural time which has been billed separately. Walker Giordano MD  Staff [facility]

## 2023-03-07 NOTE — DIABETES MGMT
Trach placement planned for today at 1pm   CT's completed with no abscess identified, plan on deescalating abx therapy  Low dose levophed (1-2 mcg/min) for assistance with fluid removal with HD  24h BG pattern: 135-184  On NPH 10 units Q12 and 0 units correctional humalog  Feeds held for trach placement for a few hrs today  Please continue current therapy. If feeds are held longer than anticipated or held overnight, would hold evening NPH. Discussed with RN and Attending.

## 2023-03-07 NOTE — PROGRESS NOTES
CRITICAL CARE NOTE      Name: Romain Wright   : 1950   MRN: [id number]   Date: 3/7/2023      Reason for ICU Admission: Acute on chronic HFrEF     ICU PROBLEM LIST   Acute on chronic HFrEF (20%) NYHA IIIb/IV (D) on home inotropic support, life vest s/p LVAD  TANNER on CKD3, on CRRT  CHB post op, resolved  Aflutter w/ RVR  Acute on chronic hypoxemic respiratory failure  CAP  CAD  Gastric neuroendocrine tumor  Hx of LUE DVT  DM  PAD  TRISTAN  BPH w/ urinary retention requiring chronic donnelly    HISTORY OF PRESENT ILLNESS:   Pt is a 71 y.o M w/ PMH as noted above admitted to [facility] on  due to ongoing symptoms secondary to his HFrEF. Treated for possible CAP, and diuresed for acute on chronic HFrEF. Nephrology following for TANNER on CKD 3. AHF team recommended transfer to CVICU for [facility] placement and ongoing LVAD workup, with placement 2/15. Pt extubated , however with development of worsening renal dysfunction overnight and unable to tolerate CRRT so was reintubated and taken for Impella RP placement for right-sided support in a.m. of  and CRRT resumed. Impella removed on 3/1. Overnight 3/1-3/1 CVA noted with SAH.  3/2 SAH enlarged. Held anticoagulation. Failed extubation 3/2    24 HOUR EVENTS:   No acute overnight events, remains intubated, sedated. Tolerated HD w/ 200ml UF, on low dose levophed this AM.    Plan for trach placement and repeat HD today.      NEUROLOGICAL:    Precedex, Dilaudid gtt  Goal RASS 0 to -1  Repeat CT with resolving SAH  Will repeat CTH after on therapeutic heparin x 24 hrs  RASHAD  Delirium precautions    PULMONOLOGY:   Lung protective ventilation, on minimal vent support at this time  Trach placement today  Monitor blood gas    CARDIOVASCULAR:   On dose levo, wean to off as tolerated  NICOM monitoring  LVAD  3L @ 4700 RPM  CVP goal 10-12  C/w ASA, statin  Goal euvolemia  Unable to tolerate BB, ARNI/ARB due to hypotension, aldactone held 2/2 elevated K     GASTROINTESTINAL: NPO, on tube feeds  PPI   Trend LFTs  Tbili downtrend   ? Pancreatitis, possibly 2/2 hyper Ca, c/w calcitonin and sensipar    RENAL/ELECTROLYTE/FLUIDS:   Strict I/Os,  goal  euvolemia  HD per nephrology   Monitor for renal recovery  Monitor RFP  Mg/Phos/K >2/3/4  Vasquez to remain in place    ENDOCRINE:    SSI, Basal insulin   Hypoglycemic protocol  Glucose goal 120-180    HEMATOLOGY/ONCOLOGY:   Resume heparin  Hemoglobin goal greater than 7, platelet goal greater than 50  EPO weekly  Gastric neuroendocrine tumor, well differentiated, good prognosis per onc.      ID/MICRO:   ID consulted, stopped vanc/flagyl  Plan to stop zosyn today  C/w fluconazole  Bcx without growth at this time  Urine with Candida       ICU DAILY CHECKLIST     Code Status:Full  DVT Prophylaxis:heparin  T/L/D: ETT,  PIV,  Vasquez,  V  wire, LVAD drive line  SUP: PPI  Diet: NPO  Activity Level:ad jose f  ABCDEF Bundle/Checklist Completed:Yes  Disposition: Stay in ICU  Multidisciplinary Rounds Completed:  yes  Patient/Family Updated: Yes    Tatiana   2/3 Transferred to CVICU for [facility] placement  2/7 started on dobutamine as adjunct to milrinone  2/15 LVAD implant  2/16 extubated  2/18 Impella RP placement  3/1 Impella removed  3/1-2 SAH on CT  3/3 RE-intubated  3/6 Blakes removed, transitioned to HD    SUBJECTIVE:     As noted above    Review of Systems:     Unable to perform due to patient intubated    OBJECTIVE:     Labs and Data: Reviewed 03/07/23  Medications: Reviewed 03/07/23  Imaging: Reviewed 03/07/23    General - sedated, intubated chronically ill appearing  HEENT- pupils equal, nystagmus, anicteric  Neuro - sedated, no focal deficit  CV - Paced,  VAD hum, post sternotomy  Resp - rales b/l, NC  Abd - soft, distended, nontender, no guarding  MSK- intact, no sacral ulcer  LVAD driveline in left chest    Visit Vitals  /71   Pulse 73   Temp 98.9 °F (37.2 °C)   Resp 21   Ht 5' 6\" (1.676 m)   Wt 66.1 kg (145 lb 11.6 oz) SpO2 100%   BMI 23.52 kg/m²    O2 Flow Rate (L/min): 20 l/min O2 Device: Endotracheal tube, Ventilator Temp (24hrs), Av.2 °F (36.8 °C), Min:97.4 °F (36.3 °C), Max:99 °F (37.2 °C)    CVP (mmHg): 11 mmHg (23 1000)      Intake/Output:     Intake/Output Summary (Last 24 hours) at 3/7/2023 1100  Last data filed at 3/7/2023 1000  Gross per 24 hour   Intake 1461.06 ml   Output 198 ml   Net 1263.06 ml       Imaging    23    ECHO ADULT FOLLOW-UP OR LIMITED 2023    Interpretation Summary    Left Ventricle: EF by visual approximation is 20%. Left ventricle is mildly dilated. Mitral Valve: Moderately thickened leaflet, at the anterior and posterior leaflets. Moderately calcified leaflet. Moderate regurgitation. Left Atrium: Left atrium is moderately dilated. Technical qualifiers: Echo study was technically difficult with poor endocardial visualization. Contrast used: Definity. Limited study, not all structures viewed    Signed by: Brett Edgar MD on 2023  4:39 PM       Pertinent imaging reviewed and interpreted as noted above    CRITICAL CARE DOCUMENTATION  I had a face to face encounter with the patient, reviewed and interpreted patient data including clinical events, labs, images, vital signs, I/O's, and examined patient. I have discussed the case and the plan and management of the patient's care with the consulting services, the bedside nurses and the respiratory therapist.      NOTE OF PERSONAL INVOLVEMENT IN CARE   This patient has a high probability of imminent, clinically significant deterioration, which requires the highest level of preparedness to intervene urgently. I participated in the decision-making and personally managed or directed the management of the following life and organ supporting interventions that required my frequent assessment to treat or prevent imminent deterioration. I personally spent 35 minutes of critical care time.   This is time spent at this critically ill patient's bedside actively involved in patient care as well as the coordination of care. This does not include any procedural time which has been billed separately. Walker Layne MD  Staff [address] [facility]

## 2023-03-07 NOTE — PROGRESS NOTES
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 Bebo Vick  C/b RV failure requiring Impella RP placed 23 and discontinued 3/1/23  C/b acute on chronic renal failure, requiring CRRT  C/b hepatic failure, TB 12 (peak 15.3)  C/b lactic acidosis, persistent  C/b persistent septic shock c/b vasodilation and high outputs, on multiple antibiotics, procalcitonin persistently elevated  C/b R SUPA occlusion with small area of matched perfusion defect - subacute infarct c/b left sided hemiparesis  C/b pancreatitis  C/b failure to extubate x 2; anticipating tracheostomy today  Patient was approved for LVAD implantation as destination therapy at [facility] 2/3/23; the following have been identified and d/w patient as relative concerns pertaining to LVAD candidacy: small body surface area, cachexia, BMI 18, malnutrition, frailty, advanced age, muscular deconditioning, PVD (fingertips), urinary retention requiring donnelly catheter, neuroendocrine tumor class 1 requiring treatment after LVAD, mild neurocognitive dysfunction, chronic kidney disease and hearing loss requiring hearing aid  Family updated on Friday  Plan on [facility] today to facilitate vent weaning          RECOMMENDATIONS:  Continue LVAD at 4700rpm; LVEDD 4.8cm.   increased 3/5/23;  LVIDd 3.9cm on current speed  CI at goal by NICOM  Continue cardene gtt until after trach for goal SBP < 110mmHg and/or MAP < 90mmHg  Start scheduled antihypertensive regimen after tracheostomy and wean cardene  Continuous NICOMs; change patches every other day  No beta-blockers due to hypotension and RV conditioning prior to LVAD  Cannot tolerate ARB/ARNi due to hypotension and upcoming surgical procedure   Cannot tolerate spironolactone due to hyperkalemia  Cannot tolerate jardiance due to significant diuresis on smallest dose/contributed to IVVD  Previously discontinued corlanor due to SVT  Digoxin stopped d/t risk for toxicity with amio loading  Discontinued amio due to intermitted heart blocks under pacemaker  HD per nephrology; goal CVP 10-12  Keep K+ >4 and Mg >2   ID recommendations appreciate,  CT's completed with no abscess identified, plan on deescalating abx therapy  Continue colchicine 0.6mg daily for possible pericarditis  Hold ASA d/t CVA   Continue to hold coumadin  Continue heparin gtt per protocol  Cannot tolerate statins due to abnormal LFT  Management of tube feeds in light of pancreatitis per intensivist  Continue bowel regimen   Daily dressing changes to Drive line exit site  Pulmonary hygiene   Plan on trach today   VAD education with caregivers/patient when able by VAD coordinator  D/w Dr. Amador Mcgee and bedside staff      INTERVAL HISTORY:  No events overnight  Moves x 4; left sided significant weakness  Afebrile  MAPs 60-90s, HR 70s  CVP 15  I/O net positive 1.3L  Hgb 8.6  INR 1.5  Ca down to 10.9  Cr 2.32  TB 11.4 from 12 from 13.2 from 13.6 from 15, peak 15.3  Lactic  2.5  Lipase 1345 3/5/23  Procal trending down 2.07  Mr. Kendall is awake on  the vent. Opens eyes. Moves extremities. Attempts to communicate, but very difficult due to intubation and [place]. CXR (3/5/23) mild pulmonary edema. Small pleural effusions and bibasilar airspace opacities. Head CTA (3/2/23) Occlusion distal right anterior cerebral artery, with associated small area of matched perfusion defect and cortical enhancement, consistent with subacute infarct.  Diminutive and irregular right vertebral artery, occluded at the V3-4 junction, age indeterminate extensive multifocal atherosclerosis in the intracranial and extracranial circulation. IMPRESSION:  Acute on chronic combined systolic/diastolic  heart failure  Stage D, NYHA class IV symptoms   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, LVEF 20% (by echo 1/2023) and 23% (by cMRI 1/3/23)  Cardiac MRI suggestive of ischemic cardiomyopathy  PYP equivocal  S/p HeartMate 3  LVAD-DT (2/15/23)  C/b RV failure requiring Impella RP placed 2/18/23 and discontinued 3/1/23  C/b acute on chronic renal failure, requiring CRRT  C/b hepatic failure, TB 12 (peak 15.3)  C/b lactic acidosis, persistent  C/b persistent septic shock c/b vasodilation and high outputs, on multiple antibiotics, procalcitonin persistently elevated  C/b R SUPA occlusion with small area of matched perfusion defect - subacute infarct c/b left sided hemiparesis  C/b pancreatitis  C/b failure to extubate x 2; anticipating tracheostomy this week  Coronary artery disease  CAD s/p CABG x 2: further disease best managed medically due to small vessel size   At risk of sudden cardiac death  Peripheral arterial disease  Bilateral hydronephrosis s/p donnelly  Cardiac risk factors:  HTN  HL  TRISTAN, STOP-BANG 4  DM2  CKD, stage 3  MOCA from 2/9 19/30, consistent with mild cognitive impairment  Hard of hearing  Gastric Neuroendocrine Tumor, elevated gastrin and chromogranin A  Septic shock, improving   Left sided weakness noted night 3/1. +SAH and subacute occlusion distal R cerebral artery         LIFE GOALS: not readdressed today   Patient's personal goals included: having a few more years with family  Important upcoming milestones or family events: None at this time   The patient identified the following as important for living well: being at home, not being SOB            CARDIAC IMAGING:   ECHO- 3/6/23       Left Ventricle: Severely reduced left ventricular systolic function with a visually estimated EF of 15 - 20%. Left ventricle is moderately dilated. LVAD is present.     Right Ventricle: Right ventricle is moderately dilated. Mildly reduced systolic function. ECHO (3/2/23) EF 20-25%; LV mod dilated; severe global hypokinesis; RV mod dilated; mildly reduced systolic function;    Echo 2/19/23    Left Ventricle: Severely reduced left ventricular systolic function with a visually estimated EF of 20 - 25%. Not well visualized. Left ventricle size is normal. Increased wall thickness. Unable to assess wall motion. Abnormal diastolic function. LVAD is present. LVAD inflow cannula was not well visualized. Right Ventricle: Not well visualized. Right ventricle is mildly dilated. Moderately reduced systolic function. TAPSE is abnormal.    Mitral Valve: Severe annular calcification at the anterior and posterior leaflets of the mitral valve. Mild regurgitation. Left Atrium: Left atrium is dilated. Right Atrium: Right atrium is dilated. Technical qualifiers: Echo study was technically difficult and technically difficult with poor endocardial visualization. Echo 1/27/23    Left Ventricle: EF by visual approximation is 20%. Left ventricle is mildly dilated. Mitral Valve: Moderately thickened leaflet, at the anterior and posterior leaflets. Moderately calcified leaflet. Moderate regurgitation. Left Atrium: Left atrium is moderately dilated. Technical qualifiers: Echo study was technically difficult with poor endocardial visualization. Contrast used: Definity. Limited study, not all structures viewed     Echo 1/9/23   Left Ventricle: Severely reduced left ventricular systolic function with a visually estimated EF of 25 - 30%. Left ventricle size is normal. Mildly increased wall thickness. Echo 12/26/22    Left Ventricle: Severely reduced left ventricular systolic function with a visually estimated EF of 25 - 30%. Left ventricle size is normal. Normal wall thickness. There are regional wall motion abnormalities. Grade II diastolic dysfunction with increased LAP.     Right Ventricle: Moderately reduced systolic function. TAPSE is abnormal. TAPSE is 1.1 cm. Aortic Valve: Mild stenosis of the aortic valve. AV peak gradient is 13 mmHg. AV peak velocity is 1.8 m/s. Mitral Valve: Not well visualized. Moderate annular calcification at the posterior leaflet of the mitral valve. Mild to moderate regurgitation. Tricuspid Valve: Mildly elevated RVSP. Left Atrium: Left atrium is moderately dilated. 12/8/22    Left Ventricle: Moderately reduced left ventricular systolic function with a visually estimated EF of 35 - 40%. Severe hypokinesis of the following segments: mid anteroseptal, apical anterior, apical septal, apical inferior and apical lateral. Severe hypokinesis of the apex. Mitral Valve: Severely thickened leaflet, at the anterior and posterior leaflets. Severely calcified leaflet, at the anterior and posterior leaflets. Mild annular calcification of the mitral valve. Moderate regurgitation. Left Atrium: Left atrium is mildly dilated. Contrast used: Definity. limited study     EKG 12/22/22 ST, Biatria enlargement, marked ST abnormality     [facility] 12/6/22  1. Normal LVEDP  2. Severe native multivessel coronary artery disease  3. Patent LIMA to LAD and vein graft to distal RCA  4. Recurrent ISR in OM1 stent with now 60 to 70% restenosis  5. Recoil of left main and circumflex stent with now recurrent 40 to 50% stenosis. 6.  Progression of ostial left main disease now to about 60% stenosis  7. Progression of disease in jailed first marginal branch now with diffuse 90% stenosis  8.   High-grade stenosis in the mid to distal right potential femoral artery treated with 6 x 40 mm impact drug-coated balloon angioplasty to reduce the stenosis to less than 40%        HEMODYNAMICS:  RHC 1/9/23  PA 20/9, RA 3, PCWP 8, CI 1.8     CPEST too ill   6MW 300 feet    LVAD INTERROGATION:  Device interrogated in person  Device function normal, normal flow, no events  LVAD   Pump Speed (RPM): 4700  Pump Flow (LPM): 3.2  MAP: 80  PI (Pulsitility Index): 6.4  Power: 3   Test: Yes  Back Up  at Bedside & Labeled: Yes  Power Module Test: Yes  Driveline Site Care: No  Driveline Dressing: Clean, Dry, and Intact  Testing  Alarms Reviewed: Yes  Back up SC speed: 4700  Back up Low Speed Limit: 4300  Emergency Equipment with Patient?: Yes  Emergency procedures reviewed?: Yes  Drive line site inspected?: No  Drive line intergrity inspected?: Yes  Drive line dressing changed?: No    PHYSICAL EXAM:  Visit Vitals  /71   Pulse 72   Temp 98.9 °F (37.2 °C)   Resp 20   Ht 5' 6\" (1.676 m)   Wt 145 lb 11.6 oz (66.1 kg)   SpO2 99%   BMI 23.52 kg/m²     Physical Assessment:   Physical Exam  Vitals and nursing note reviewed. Constitutional:       Appearance: He is ill-appearing. Cardiovascular:      Rate and Rhythm: Normal rate and regular rhythm. Heart sounds: Normal heart sounds. Comments: + VAD hum  Pulmonary:      Breath sounds: Rhonchi present. Comments: intubated  Abdominal:      General: There is no distension. Palpations: Abdomen is soft. Musculoskeletal:         General: No swelling. Skin:     General: Skin is warm and dry.    Neurological:      Comments: sedated              REVIEW OF SYSTEMS:  Review of Systems   Unable to perform ROS: Acuity of condition      PAST MEDICAL HISTORY:  Past Medical History:   Diagnosis Date    CAD (coronary artery disease) 11/10/2016    NSTEMI & 2 stents    Deafness 10/28/2012    DM (diabetes mellitus) ([facility] [address]     Elevated cholesterol     Hypertension     NSTEMI (non-ST elevated myocardial infarction) ([facility] [address] 11/10/2016       PAST SURGICAL HISTORY:  Past Surgical History:   Procedure Laterality Date    COLONOSCOPY N/A 6/28/2018    COLONOSCOPY performed by Angi Wan MD at [facility] N/A 1/18/2023    COLONOSCOPY performed by Lee Chatman MD at [address] [place] [address] CARDIAC SURGERY  11/11/2016    2 stents       FAMILY HISTORY:  Family History   Problem Relation Age of Onset    Heart Disease Father     Heart Attack Father     Hypertension Mother     Elevated Lipids Brother     Elevated Lipids Brother     No Known Problems Sister     Elevated Lipids Brother     No Known Problems Son     No Known Problems Daughter     Anesth Problems Neg Hx        SOCIAL HISTORY:  Social History     Socioeconomic History    Marital status:    Tobacco Use    Smoking status: Never     Passive exposure: Never    Smokeless tobacco: Never   Vaping Use    Vaping Use: Never used   Substance and Sexual Activity    Alcohol use: Yes     Alcohol/week: 2.0 standard drinks     Types: 1 Cans of beer, 1 Shots of liquor per week     Comment: rarely    Drug use: No    Sexual activity: Yes     Social Determinants of Health     Financial Resource Strain: Medium Risk    Difficulty of Paying Living Expenses: Somewhat hard   Food Insecurity: Food Insecurity Present    Worried About Running Out of Food in the Last Year: Never true    Ran Out of Food in the Last Year: Often true       LABORATORY RESULTS:     Labs Latest Ref Rng & Units 3/7/2023 3/6/2023 3/5/2023 3/4/2023 3/3/2023 3/2/2023 3/1/2023   WBC 4.1 - 11.1 K/uL 7.4 7.4 7.0 7.9 8.0 6.7 8.4   RBC 4.10 - 5.70 M/uL 2.71(L) 2.79(L) 2.77(L) 2.83(L) 2.66(L) 2.69(L) 2.92(L)   Hemoglobin 12.1 - 17.0 g/dL 8.6(L) 8.8(L) 8.6(L) 8.8(L) 8.2(L) 8. 3(L) 8. 9(L)   Hematocrit 36.6 - 50.3 % 27. 1(L) 27. 9(L) 29. 2(L) 29. 1(L) 27. 6(L) 26. 5(L) 27. 3(L)   MCV 80.0 - 99.0 . 0(H) 100. 0(H) 105. 4(H) 102. 8(H) 103. 8(H) 98.5 93.5   Platelets 721 - 654 K/uL 255 253 243 276 230 190 201   Lymphocytes 12 - 49 % - - 11(L) 9(L) 9(L) 6(L) 12   Monocytes 5 - 13 % - - 8 10 10 5 11   Eosinophils 0 - 7 % - - 8(H) 6 5 5 6   Basophils 0 - 1 % - - 1 1 1 1 1   Albumin 3.5 - 5.0 g/dL 3.6 3.6 3.7 4.1 3.9 4.0 3.7   Calcium 8.5 - 10.1 MG/DL 10. 9(H) 13. 2(HH) 12. 4(H) 12. 1(H) 11. 5(H) 10. 8(H) 10. 8(H) Glucose 65 - 100 mg/dL 165(H) 134(H) 201(H) 199(H) 206(H) 189(H) 133(H)   BUN 6 - 20 MG/DL 30(H) 36(H) 23(H) 24(H) 26(H) 25(H) 19   Creatinine 0.70 - 1.30 MG/DL 2.32(H) 2.39(H) 1.64(H) 1.67(H) 1.67(H) 1.62(H) 1.59(H)   Sodium 136 - 145 mmol/L 136 139 135(L) 138 137 135(L) 137   Potassium 3.5 - 5.1 mmol/L 4.5 4.8 4.4 4.5 4.3 4.4 3.2(L)   TSH 0.36 - 3.74 uIU/mL - - - - - - -   PSA 0.01 - 4.0 ng/mL - - - - - - -   LDH 85 - 241 U/L 256(H) 248(H) 285(H) 334(H) 355(H) 419(H) 612(H)   Some recent data might be hidden     Lab Results   Component Value Date/Time    TSH 3.52 01/28/2023 05:26 AM    TSH 2.12 12/27/2022 02:36 PM    TSH 4.80 (H) 12/06/2022 03:53 AM    TSH 5.39 (H) 10/12/2022 09:10 AM    TSH 3.53 02/03/2022 11:47 AM    TSH 5.790 (H) 11/21/2019 04:45 PM    TSH 3.08 06/22/2018 01:53 PM    TSH 4.250 05/26/2015 09:43 AM       ALLERGY:  Allergies   Allergen Reactions    Ambien [Zolpidem] Other (comments)     Causes extreme confusion        CURRENT MEDICATIONS:    Current Facility-Administered Medications:     insulin NPH (NOVOLIN N, HUMULIN N) injection 10 Units, 10 Units, SubCUTAneous, Q12H, Cathy Sheldon CNS, 10 Units at 03/07/23 0825    cinacalcet (SENSIPAR) tablet 30 mg, 30 mg, Oral, DAILY WITH BREAKFAST, Cuco Smith MD, 30 mg at 03/07/23 0825    albumin human 25% (BUMINATE) solution 25 g, 25 g, IntraVENous, DIALYSIS PRN, Cuco Smith MD    insulin lispro (HUMALOG) injection, , SubCUTAneous, Q6H, Walker Aponte MD    0.9% sodium chloride infusion 250 mL, 250 mL, IntraVENous, CONTINUOUS, Danuta Viveros MD, 250 mL at 03/05/23 0926    hydrALAZINE (APRESOLINE) 20 mg/mL injection 5 mg, 5 mg, IntraVENous, Q6H PRN, Danuta Vvieros MD    hydrALAZINE (APRESOLINE) tablet 5 mg, 5 mg, Oral, PRN, Danuta Viveros MD    niCARdipine (CARDENE) 25 mg in 0.9% sodium chloride 250 mL ([id number]), 0-15 mg/hr, IntraVENous, TITRATE, Jarrell Mckinnon DO, Stopped at 03/05/23 1925    aspirin chewable tablet 81 mg, 81 mg, Per NG tube, DAILY, Jazmine Hunter MD, 81 mg at 03/07/23 0825    levETIRAcetam (KEPPRA) injection 500 mg, 500 mg, IntraVENous, Q12H, Yancy Zelaya ARNP, 500 mg at 03/07/23 0113    bicarbonate dialysis (PRISMASOL) BG K 4/Ca 2.5 5000 ml solution, , Extracorporeal, DIALYSIS CONTINUOUS, Deanna Schultz MD, Last Rate: 1,750 mL/hr at 03/05/23 1106, New Bag at 03/05/23 1106    epoetin heidy-epbx (RETACRIT) injection 10,000 Units, 10,000 Units, SubCUTAneous, Q TUE, THU & SAT, Deanna Schultz MD, 10,000 Units at 03/04/23 2050    EPINEPHrine (ADRENALIN) 10 mg in 0.9% sodium chloride 250 mL infusion, 0-10 mcg/min, IntraVENous, TITRATE, Master Johnson MD, Stopped at 03/05/23 0920    propofol (DIPRIVAN) 10 mg/mL infusion, 0-50 mcg/kg/min, IntraVENous, TITRATE, Tanya Groves MD, Stopped at 03/02/23 1628    HYDROmorphone 30 mg/30 mL (1 mg/mL) infusion, 0.5-1 mg/hr, IntraVENous, CONTINUOUS, Master Johnson MD, Last Rate: 0.5 mL/hr at 03/07/23 0756, 0.5 mg/hr at 03/07/23 0756    dextrose (D50W) injection syrg 25 g, 25 g, IntraVENous, PRN, Kapil Pena MD, 9 mL at 02/25/23 1225    neomycin-polymyxin-dexamethasone (Dallis Exon) 3.5 mg/g-10,000 unit/g-0.1 % ophthalmic ointment, , Both Eyes, BID, Sergio Collier MD, Given at 03/07/23 5086    NOREPINephrine (LEVOPHED) 8 mg in 5% dextrose 250mL (32 mcg/mL) infusion, 0.5-16 mcg/min, IntraVENous, TITRATE, Master Johnson MD, Last Rate: 1.9 mL/hr at 03/07/23 0755, 1 mcg/min at 03/07/23 0755    colchicine tablet 0.6 mg, 0.6 mg, Oral, DAILY, Shaila, Lizette B, NP, 0.6 mg at 03/07/23 0825    fluconazole (DIFLUCAN) 400mg/200 mL IVPB (premix), 400 mg, IntraVENous, Q24H, Shaila, Lizette B, NP, Last Rate: 100 mL/hr at 03/06/23 1251, 400 mg at 03/06/23 1251    piperacillin-tazobactam (ZOSYN) 3.375 g in 0.9% sodium chloride (MBP/ADV) 100 mL MBP, 3.375 g, IntraVENous, Q8H, Miya MOLINA MD, Last Rate: 25 mL/hr at 03/07/23 0558, 3.375 g at 03/07/23 5209 heparin (porcine) 1,000 unit/mL injection 1,700 Units, 1,700 Units, InterCATHeter, DIALYSIS PRN, 1,700 Units at 03/06/23 1853 **AND** heparin (porcine) 1,000 unit/mL injection 1,500 Units, 1,500 Units, InterCATHeter, DIALYSIS PRN, Lorena Quevedo DO, 1,500 Units at 03/06/23 1852    balsam peru-castor oiL (VENELEX) ointment, , Topical, BID, Keith Arteaga MD, Given at 03/07/23 5821    acetaminophen (TYLENOL) solution 650 mg, 650 mg, Oral, Q4H PRN, Mehul Rosales MD, 650 mg at 02/20/23 0401    acetaminophen (TYLENOL) suppository 650 mg, 650 mg, Rectal, Q4H PRN, Mehul Rosales MD, 650 mg at 02/17/23 2013    HYDROmorphone (DILAUDID) injection 0.5 mg, 0.5 mg, IntraVENous, Q3H PRN, Vidya MOLINA MD, 0.5 mg at 02/22/23 2225    HYDROmorphone (DILAUDID) injection 1 mg, 1 mg, IntraVENous, Q4H PRN, Vidya MOLINA MD, 1 mg at 02/28/23 0557    heparin 25,000 units in D5W 250 ml infusion, 12-25 Units/kg/hr, IntraVENous, TITRATE, Mike Kirk MD, Last Rate: 7.5 mL/hr at 03/07/23 0755, 11 Units/kg/hr at 03/07/23 0755    albumin human 25% (BUMINATE) solution 12.5 g, 12.5 g, IntraVENous, Q6H, Mike iKrk MD, Last Rate: 60 mL/hr at 02/27/23 0020, 12.5 g at 03/07/23 0558    bumetanide (BUMEX) injection 0.5 mg, 0.5 mg, IntraVENous, Q4H PRN, Mike Kirk MD, 0.5 mg at 02/17/23 1431    glucose chewable tablet 16 g, 4 Tablet, Oral, PRN, Lizette Linton, NP    glucagon (GLUCAGEN) injection 1 mg, 1 mg, IntraMUSCular, PRN, Shaila, Lizette B, NP    sodium chloride (NS) flush 5-40 mL, 5-40 mL, IntraVENous, Q8H, Shaila, Lizette B, NP, 10 mL at 03/07/23 0559    sodium chloride (NS) flush 5-40 mL, 5-40 mL, IntraVENous, PRN, Shaila, Lizette B, NP, 40 mL at 02/17/23 1818    naloxone (NARCAN) injection 0.4 mg, 0.4 mg, IntraVENous, PRN, Shaila, Lizette B, NP    ELECTROLYTE REPLACEMENT NOTE: Nurse to review Serum Potassium and Magnesuim levels and Initiate Electrolyte Replacement Protocol as needed, 1 Each, Other, PRN, Shaila, Lizette B, NP    dextrose 10 % infusion 0-250 mL, 0-250 mL, IntraVENous, PRN, Shaila, Lizette B, NP, Last Rate: 150 mL/hr at 02/24/23 0220, 75 mL at 02/24/23 0220    acetaminophen (TYLENOL) tablet 650 mg, 650 mg, Oral, Q6H PRN, Shaila, Lizette B, NP, 650 mg at 02/17/23 0200    ondansetron (ZOFRAN) injection 4 mg, 4 mg, IntraVENous, Q4H PRN, Shaila, Lizette B, NP    albuterol-ipratropium (DUO-NEB) 2.5 MG-0.5 MG/3 ML, 3 mL, Nebulization, Q6H PRN, Shaila, Lizette B, NP    senna-docusate (PERICOLACE) 8.6-50 mg per tablet 1 Tablet, 1 Tablet, Oral, DAILY, Shaila, Lizette B, NP, 1 Tablet at 03/03/23 0856    polyethylene glycol (MIRALAX) packet 17 g, 17 g, Oral, DAILY, Shaila, Lizette B, NP, 17 g at 03/03/23 0856    bisacodyL (DULCOLAX) suppository 10 mg, 10 mg, Rectal, DAILY PRN, Shaila, Lizette B, NP, 10 mg at 02/22/23 0839    0.45% sodium chloride infusion, 10 mL/hr, IntraVENous, CONTINUOUS, Kamila Wilson MD, Last Rate: 10 mL/hr at 03/05/23 0751, 10 mL/hr at 03/05/23 0751    DOBUTamine (DOBUTREX) 500 mg/250 mL (2,000 mcg/mL) infusion, 0-10 mcg/kg/min, IntraVENous, TITRATE, Edson Johnson MD, Stopped at 02/23/23 1044    dexmedeTOMidine in 0.9 % NaCl (PRECEDEX) 400 mcg/100 mL (4 mcg/mL) infusion soln, 0.1-1.5 mcg/kg/hr, IntraVENous, TITRATE, Edson Johnson MD, Last Rate: 8.5 mL/hr at 03/07/23 0754, 0.6 mcg/kg/hr at 03/07/23 0754    0.9% sodium chloride infusion, 9 mL/hr, IntraVENous, CONTINUOUS, Jewel Woody MD, Last Rate: 9 mL/hr at 03/07/23 0754, 9 mL/hr at 03/07/23 0754    PATIENT CARE TEAM:  Patient Care Team:  Berny Cedeno MD as PCP - General (Family Medicine)  Berny Cedeno MD as PCP - [facility]  Jan Mckeon MD (Cardiovascular Disease Physician)  Porsha Lebron MD (Gastroenterology)  Jewel Woody MD (Cardiothoracic Surgery)  Nathen Thomas MD (Cardiovascular Disease Physician)  Josi Berry MD (Nephrology)  Jesse Kohler MD (Pulmonary Disease)  Jennfier Harvey MD ([address] Vascular Surgery)     Thank you for allowing me to participate in this patient's care. Sathya Escudero NP   [address] [place] [address], [address]  Phone: [phone]    Total Critical Care time spent:   45 minutes. There was no overlap with other services        Critical care was necessary to treat or prevent imminent or life threatening deterioration of the following conditions: cardiac failure, respiratory failure and CNS failure or compromise     Services Provided:  1. Telemetry review and 12 lead ECG interpretation  2. Hemodynamic interpretation, assessment, and management  3. Review and interpretation of CXR  4. Review and interpretation of lab values  5. Review and interpretation of microbiologic data and culture results  6. Review of medications and administration  7. Review and interpretation of nutrition requirements and management  8. Discussion of management withother consultants and services  9.  Clinical update to family members

## 2023-03-07 NOTE — PROGRESS NOTES
Pharmacy renal dose protocol:  1) Zosyn  Indication:  PNA  Current regimen:  3.375g IV q8h extended infusion  Est CrCl: CRRT changed to iHD    Plan: Change to 3.375g IV q12h extended infusion    2) Fluconazole  Indication:  uti  Current regimen:  400 mg IV Q24H  Est CrCl: CRRT changed to iHD    Plan: Change to 200 mg IV Q24H

## 2023-03-08 NOTE — PROGRESS NOTES
Problem: Diabetes Self-Management  Goal: *Disease process and treatment process  Description: Define diabetes and identify own type of diabetes; list 3 options for treating diabetes. Outcome: Progressing Towards Goal  Goal: *Incorporating nutritional management into lifestyle  Description: Describe effect of type, amount and timing of food on blood glucose; list 3 methods for planning meals. Outcome: Progressing Towards Goal  Goal: *Incorporating physical activity into lifestyle  Description: State effect of exercise on blood glucose levels. Outcome: Progressing Towards Goal  Goal: *Developing strategies to promote health/change behavior  Description: Define the ABC's of diabetes; identify appropriate screenings, schedule and personal plan for screenings. Outcome: Progressing Towards Goal  Goal: *Using medications safely  Description: State effect of diabetes medications on diabetes; name diabetes medication taking, action and side effects. Outcome: Progressing Towards Goal  Goal: *Monitoring blood glucose, interpreting and using results  Description: Identify recommended blood glucose targets  and personal targets. Outcome: Progressing Towards Goal  Goal: *Prevention, detection, treatment of acute complications  Description: List symptoms of hyper- and hypoglycemia; describe how to treat low blood sugar and actions for lowering  high blood glucose level. Outcome: Progressing Towards Goal  Goal: *Prevention, detection and treatment of chronic complications  Description: Define the natural course of diabetes and describe the relationship of blood glucose levels to long term complications of diabetes.   Outcome: Progressing Towards Goal  Goal: *Developing strategies to address psychosocial issues  Description: Describe feelings about living with diabetes; identify support needed and support network  Outcome: Progressing Towards Goal  Goal: *Insulin pump training  Outcome: Progressing Towards Goal  Goal: *Sick day guidelines  Outcome: Progressing Towards Goal  Goal: *Patient Specific Goal (EDIT GOAL, INSERT TEXT)  Outcome: Progressing Towards Goal     Problem: Patient Education: Go to Patient Education Activity  Goal: Patient/Family Education  Outcome: Progressing Towards Goal     Problem: Pressure Injury - Risk of  Goal: *Prevention of pressure injury  Description: Document Mike Scale and appropriate interventions in the flowsheet. Outcome: Progressing Towards Goal  Note: Pressure Injury Interventions:  Sensory Interventions: Assess changes in LOC, Keep linens dry and wrinkle-free, Minimize linen layers    Moisture Interventions: Minimize layers    Activity Interventions: Pressure redistribution bed/mattress(bed type)    Mobility Interventions: Float heels, Assess need for specialty bed    Nutrition Interventions: Document food/fluid/supplement intake    Friction and Shear Interventions: Apply protective barrier, creams and emollients, Minimize layers                Problem: Patient Education: Go to Patient Education Activity  Goal: Patient/Family Education  Outcome: Progressing Towards Goal     Problem: Falls - Risk of  Goal: *Absence of Falls  Description: Document Laverne Fall Risk and appropriate interventions in the flowsheet.   Outcome: Progressing Towards Goal  Note: Fall Risk Interventions:  Mobility Interventions: PT Consult for mobility concerns         Medication Interventions: Evaluate medications/consider consulting pharmacy    Elimination Interventions: Call light in reach    History of Falls Interventions: Door open when patient unattended         Problem: Patient Education: Go to Patient Education Activity  Goal: Patient/Family Education  Outcome: Progressing Towards Goal     Problem: Pain  Goal: *Control of Pain  Outcome: Progressing Towards Goal  Goal: *PALLIATIVE CARE:  Alleviation of Pain  Outcome: Progressing Towards Goal     Problem: Patient Education: Go to Patient Education Activity  Goal: Patient/Family Education  Outcome: Progressing Towards Goal     Problem: Patient Education: Go to Patient Education Activity  Goal: Patient/Family Education  Outcome: Progressing Towards Goal     Problem: Patient Education: Go to Patient Education Activity  Goal: Patient/Family Education  Outcome: Progressing Towards Goal     Problem: Nutrition Deficit  Goal: *Optimize nutritional status  Outcome: Progressing Towards Goal     Problem: Ventilator Management  Goal: *Adequate oxygenation and ventilation  Outcome: Progressing Towards Goal  Goal: *Patient maintains clear airway/free of aspiration  Outcome: Progressing Towards Goal  Goal: *Absence of infection signs and symptoms  Outcome: Progressing Towards Goal  Goal: *Normal spontaneous ventilation  Outcome: Progressing Towards Goal     Problem: Patient Education: Go to Patient Education Activity  Goal: Patient/Family Education  Outcome: Progressing Towards Goal     Problem: Patient Education: Go to Patient Education Activity  Goal: Patient/Family Education  Outcome: Progressing Towards Goal     Problem: LVAD Pre-op Period  Goal: Off Pathway (Use only if patient is Off Pathway)  Outcome: Progressing Towards Goal  Goal: Activity/Safety  Outcome: Progressing Towards Goal  Goal: Consults, if ordered  Outcome: Progressing Towards Goal  Goal: Diagnostic Test/Procedures  Outcome: Progressing Towards Goal  Goal: Nutrition/Diet  Outcome: Progressing Towards Goal  Goal: Medications  Outcome: Progressing Towards Goal  Goal: Treatments/Interventions/Procedures  Outcome: Progressing Towards Goal  Goal: Discharge Planning  Outcome: Progressing Towards Goal  Goal: Psychosocial  Outcome: Progressing Towards Goal  Goal: *Hemodynamically stable (eg: Cardiac output/index; pulmonary arterial pressures; mixed venous oxygen saturation within set parameters)  Outcome: Progressing Towards Goal  Goal: *Labs/tests completed  Outcome: Progressing Towards Goal     Problem: LVAD Pre-Op Day  Goal: Off Pathway (Use only if patient is Off Pathway)  Outcome: Progressing Towards Goal  Goal: Activity/Safety  Outcome: Progressing Towards Goal  Goal: Consults, if ordered  Outcome: Progressing Towards Goal  Goal: Diagnostic Test/Procedures  Outcome: Progressing Towards Goal  Goal: Nutrition/Diet  Outcome: Progressing Towards Goal  Goal: Medications  Outcome: Progressing Towards Goal  Goal: Treatments/Interventions/Procedures  Outcome: Progressing Towards Goal  Goal: Discharge Planning  Outcome: Progressing Towards Goal  Goal: Psychosocial  Outcome: Progressing Towards Goal  Goal: *Vital signs within specified parameters  Outcome: Progressing Towards Goal  Goal: *Consent obtained, permits and diagnostics complete  Outcome: Progressing Towards Goal  Goal: *Confirmation of post discharge primary support person(s)  Outcome: Progressing Towards Goal     Problem: LVAD Day of Surgery (Initiate SCIP measure for post-op care)  Goal: Off Pathway (Use only if patient is Off Pathway)  Outcome: Progressing Towards Goal  Goal: Activity/Safety  Outcome: Progressing Towards Goal  Goal: Consults, if ordered  Outcome: Progressing Towards Goal  Goal: Diagnostic Test/Procedures  Outcome: Progressing Towards Goal  Goal: Nutrition/Diet  Outcome: Progressing Towards Goal  Goal: Medications  Outcome: Progressing Towards Goal  Goal: Respiratory  Outcome: Progressing Towards Goal  Goal: Treatments/Interventions/Procedures  Outcome: Progressing Towards Goal  Goal: Psychosocial  Outcome: Progressing Towards Goal  Goal: *Hemodynamically stable (eg: Cardiac output/index; pulmonary arterial pressures; mixed venous oxygen saturation within set parameters)  Outcome: Progressing Towards Goal  Goal: *Lungs clear or at baseline  Outcome: Progressing Towards Goal  Goal: *Stable cardiac rhythm  Outcome: Progressing Towards Goal  Goal: *Follows simple commands post anesthesia  Outcome: Progressing Towards Goal  Goal: *Optimal pain control at patient's stated goal  Outcome: Progressing Towards Goal  Goal: *LVAD parameters within set limits  Outcome: Progressing Towards Goal     Problem: Nutrition Deficit  Goal: *Optimize nutritional status  Outcome: Progressing Towards Goal

## 2023-03-08 NOTE — PROGRESS NOTES
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 Denia Parrish  C/b RV failure requiring Impella RP placed 23 and discontinued 3/1/23  C/b acute on chronic renal failure, requiring CRRT  C/b hepatic failure, TB 12 (peak 15.3)  C/b lactic acidosis, persistent  C/b persistent septic shock c/b vasodilation and high outputs, on multiple antibiotics, procalcitonin persistently elevated  C/b R SUPA occlusion with small area of matched perfusion defect - subacute infarct c/b left sided hemiparesis  C/b pancreatitis  C/b failure to extubate x 2; anticipating tracheostomy today  Patient was approved for LVAD implantation as destination therapy at [facility] 2/3/23; the following have been identified and d/w patient as relative concerns pertaining to LVAD candidacy: small body surface area, cachexia, BMI 18, malnutrition, frailty, advanced age, muscular deconditioning, PVD (fingertips), urinary retention requiring donnelly catheter, neuroendocrine tumor class 1 requiring treatment after LVAD, mild neurocognitive dysfunction, chronic kidney disease and hearing loss requiring hearing aid  Family updated on Friday  Plan on [facility] today to facilitate vent weaning          RECOMMENDATIONS:  Continue LVAD at 4600rpm  CI at goal by NICOM  Continue cardene gtt until after trach for goal SBP < 110mmHg and/or MAP < 90mmHg  Start scheduled antihypertensive regimen after tracheostomy   Continuous NICOMs; change patches every other day  No beta-blockers due to hypotension and RV conditioning prior to LVAD  Cannot tolerate ARB/ARNi due to hypotension and upcoming surgical procedure   Cannot tolerate spironolactone due to hyperkalemia  Cannot tolerate jardiance due to significant diuresis on smallest dose/contributed to IVVD  Previously discontinued corlanor due to SVT  Digoxin stopped d/t risk for toxicity with amio loading  Discontinued amio due to intermitted heart blocks under pacemaker  HD per nephrology; goal CVP 10-12  Keep K+ >4 and Mg >2   ID recommendations appreciate,  CT's completed with no abscess identified, plan on deescalating abx therapy  Continue colchicine 0.6mg daily for possible pericarditis  Hold ASA d/t CVA   Continue to hold coumadin  Continue heparin gtt per protocol  Cannot tolerate statins due to abnormal LFT  Management of tube feeds in light of pancreatitis per intensivist  Continue bowel regimen   Daily dressing changes to Drive line exit site  Pulmonary hygiene   Plan on trach today   VAD education with caregivers/patient when able by VAD coordinator  D/w Dr. Tanya Giordano and bedside staff      INTERVAL HISTORY:  Bedside trach   Heparin off for bleeding   Moves x 4; left sided significant weakness  Afebrile  MAPs 60-90s, HR 90-110s  CVP 19  I/O net positive 2L  Hgb 7.9  Ca down to 10.9  Cr 2.33  TB 12 peak 15.3  Lactic  2.9  Lipase 1345 3/5/23  Procal trending down 1.89         CXR (3/5/23) mild pulmonary edema. Small pleural effusions and bibasilar airspace opacities. Head CTA (3/2/23) Occlusion distal right anterior cerebral artery, with associated small area of matched perfusion defect and cortical enhancement, consistent with subacute infarct. Diminutive and irregular right vertebral artery, occluded at the V3-4 junction, age indeterminate extensive multifocal atherosclerosis in the intracranial and extracranial circulation.      IMPRESSION:  Acute on chronic combined systolic/diastolic  heart failure  Stage D, NYHA class IV symptoms   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, LVEF 20% (by echo 1/2023) and 23% (by cMRI 1/3/23)  Cardiac MRI suggestive of ischemic cardiomyopathy  PYP equivocal  S/p HeartMate 3  LVAD-DT (2/15/23)  C/b RV failure requiring Impella RP placed 2/18/23 and discontinued 3/1/23  C/b acute on chronic renal failure, requiring CRRT  C/b hepatic failure, TB 12 (peak 15.3)  C/b lactic acidosis, persistent  C/b persistent septic shock c/b vasodilation and high outputs, on multiple antibiotics, procalcitonin persistently elevated  C/b R SUPA occlusion with small area of matched perfusion defect - subacute infarct c/b left sided hemiparesis  C/b pancreatitis  C/b failure to extubate x 2; anticipating tracheostomy this week  Coronary artery disease  CAD s/p CABG x 2: further disease best managed medically due to small vessel size   At risk of sudden cardiac death  Peripheral arterial disease  Bilateral hydronephrosis s/p donnelly  Cardiac risk factors:  HTN  HL  TRISTAN, STOP-BANG 4  DM2  CKD, stage 3  MOCA from 2/9 19/30, consistent with mild cognitive impairment  Hard of hearing  Gastric Neuroendocrine Tumor, elevated gastrin and chromogranin A  Septic shock, improving   Left sided weakness noted night 3/1. +SAH and subacute occlusion distal R cerebral artery         LIFE GOALS: not readdressed today   Patient's personal goals included: having a few more years with family  Important upcoming milestones or family events: None at this time   The patient identified the following as important for living well: being at home, not being SOB            CARDIAC IMAGING:   ECHO- 3/6/23       Left Ventricle: Severely reduced left ventricular systolic function with a visually estimated EF of 15 - 20%. Left ventricle is moderately dilated. LVAD is present. Right Ventricle: Right ventricle is moderately dilated. Mildly reduced systolic function.        ECHO (3/2/23) EF 20-25%; LV mod dilated; severe global hypokinesis; RV mod dilated; mildly reduced systolic function;    Echo 2/19/23    Left Ventricle: Severely reduced left ventricular systolic function with a visually estimated EF of 20 - 25%. Not well visualized. Left ventricle size is normal. Increased wall thickness. Unable to assess wall motion. Abnormal diastolic function. LVAD is present. LVAD inflow cannula was not well visualized. Right Ventricle: Not well visualized. Right ventricle is mildly dilated. Moderately reduced systolic function. TAPSE is abnormal.    Mitral Valve: Severe annular calcification at the anterior and posterior leaflets of the mitral valve. Mild regurgitation. Left Atrium: Left atrium is dilated. Right Atrium: Right atrium is dilated. Technical qualifiers: Echo study was technically difficult and technically difficult with poor endocardial visualization. Echo 1/27/23    Left Ventricle: EF by visual approximation is 20%. Left ventricle is mildly dilated. Mitral Valve: Moderately thickened leaflet, at the anterior and posterior leaflets. Moderately calcified leaflet. Moderate regurgitation. Left Atrium: Left atrium is moderately dilated. Technical qualifiers: Echo study was technically difficult with poor endocardial visualization. Contrast used: Definity. Limited study, not all structures viewed     Echo 1/9/23   Left Ventricle: Severely reduced left ventricular systolic function with a visually estimated EF of 25 - 30%. Left ventricle size is normal. Mildly increased wall thickness. Echo 12/26/22    Left Ventricle: Severely reduced left ventricular systolic function with a visually estimated EF of 25 - 30%. Left ventricle size is normal. Normal wall thickness. There are regional wall motion abnormalities. Grade II diastolic dysfunction with increased LAP. Right Ventricle: Moderately reduced systolic function. TAPSE is abnormal. TAPSE is 1.1 cm. Aortic Valve: Mild stenosis of the aortic valve. AV peak gradient is 13 mmHg. AV peak velocity is 1.8 m/s. Mitral Valve: Not well visualized.  Moderate annular calcification at the posterior leaflet of the mitral valve. Mild to moderate regurgitation. Tricuspid Valve: Mildly elevated RVSP. Left Atrium: Left atrium is moderately dilated. 12/8/22    Left Ventricle: Moderately reduced left ventricular systolic function with a visually estimated EF of 35 - 40%. Severe hypokinesis of the following segments: mid anteroseptal, apical anterior, apical septal, apical inferior and apical lateral. Severe hypokinesis of the apex. Mitral Valve: Severely thickened leaflet, at the anterior and posterior leaflets. Severely calcified leaflet, at the anterior and posterior leaflets. Mild annular calcification of the mitral valve. Moderate regurgitation. Left Atrium: Left atrium is mildly dilated. Contrast used: Definity. limited study     EKG 12/22/22 ST, Biatria enlargement, marked ST abnormality     LHC 12/6/22  1. Normal LVEDP  2. Severe native multivessel coronary artery disease  3. Patent LIMA to LAD and vein graft to distal RCA  4. Recurrent ISR in OM1 stent with now 60 to 70% restenosis  5. Recoil of left main and circumflex stent with now recurrent 40 to 50% stenosis. 6.  Progression of ostial left main disease now to about 60% stenosis  7. Progression of disease in jailed first marginal branch now with diffuse 90% stenosis  8.   High-grade stenosis in the mid to distal right potential femoral artery treated with 6 x 40 mm impact drug-coated balloon angioplasty to reduce the stenosis to less than 40%        HEMODYNAMICS:  RHC 1/9/23  PA 20/9, RA 3, PCWP 8, CI 1.8     CPEST too ill   6MW 300 feet    LVAD INTERROGATION:  Device interrogated in person  Device function normal, normal flow, no events  LVAD   Pump Speed (RPM): 4600  Pump Flow (LPM): 2.9  MAP: 98 (L brachial doppler)  PI (Pulsitility Index): 7.4  Power: 2.9   Test: Yes  Back Up  at Bedside & Labeled: Yes  Power Module Test: Yes  Driveline Site Care: No  Driveline Dressing: Clean, Dry, and Intact  Testing  Alarms Reviewed: Yes  Back up SC speed: 4600  Back up Low Speed Limit: 4200  Emergency Equipment with Patient?: Yes  Emergency procedures reviewed?: Yes  Drive line site inspected?: No  Drive line intergrity inspected?: Yes  Drive line dressing changed?: No    PHYSICAL EXAM:  Visit Vitals  /73 (BP 1 Location: Left lower arm, BP Patient Position: At rest) Comment: treatment ended. Blood returned   Pulse (!) 117   Temp 98.9 °F (37.2 °C)   Resp (!) 32   Ht 5' 6\" (1.676 m)   Wt 149 lb 0.5 oz (67.6 kg)   SpO2 100%   BMI 24.05 kg/m²     Physical Assessment:   Physical Exam  Vitals and nursing note reviewed. Constitutional:       Appearance: He is ill-appearing. Cardiovascular:      Rate and Rhythm: Normal rate and regular rhythm. Heart sounds: Normal heart sounds. Comments: + VAD hum  Pulmonary:      Breath sounds: Rhonchi present. Comments: intubated  Abdominal:      General: There is no distension. Palpations: Abdomen is soft. Musculoskeletal:         General: No swelling. Skin:     General: Skin is warm and dry. Coloration: Skin is jaundiced.    Neurological:      Comments: sedated              REVIEW OF SYSTEMS:  Review of Systems   Unable to perform ROS: Acuity of condition      PAST MEDICAL HISTORY:  Past Medical History:   Diagnosis Date    CAD (coronary artery disease) 11/10/2016    NSTEMI & 2 stents    Deafness 10/28/2012    DM (diabetes mellitus) ([facility] [address]     Elevated cholesterol     Hypertension     NSTEMI (non-ST elevated myocardial infarction) ([facility] [address] 11/10/2016       PAST SURGICAL HISTORY:  Past Surgical History:   Procedure Laterality Date    COLONOSCOPY N/A 6/28/2018    COLONOSCOPY performed by Jamie Jennings MD at [facility] ENDOSCOPY    COLONOSCOPY N/A 1/18/2023    COLONOSCOPY performed by Gagan Morales MD at [address] [place] [address]  11/11/2016    2 stents       FAMILY HISTORY:  Family History   Problem Relation Age of Onset    Heart Disease Father     Heart Attack Father     Hypertension Mother     Elevated Lipids Brother     Elevated Lipids Brother     No Known Problems Sister     Elevated Lipids Brother     No Known Problems Son     No Known Problems Daughter     Anesth Problems Neg Hx        SOCIAL HISTORY:  Social History     Socioeconomic History    Marital status:    Tobacco Use    Smoking status: Never     Passive exposure: Never    Smokeless tobacco: Never   Vaping Use    Vaping Use: Never used   Substance and Sexual Activity    Alcohol use: Yes     Alcohol/week: 2.0 standard drinks     Types: 1 Cans of beer, 1 Shots of liquor per week     Comment: rarely    Drug use: No    Sexual activity: Yes     Social Determinants of Health     Financial Resource Strain: Medium Risk    Difficulty of Paying Living Expenses: Somewhat hard   Food Insecurity: Food Insecurity Present    Worried About Running Out of Food in the Last Year: Never true    Ran Out of Food in the Last Year: Often true       LABORATORY RESULTS:     Labs Latest Ref Rng & Units 3/8/2023 3/7/2023 3/6/2023 3/5/2023 3/4/2023 3/3/2023 3/2/2023   WBC 4.1 - 11.1 K/uL 7.6 7.4 7.4 7.0 7.9 8.0 6.7   RBC 4.10 - 5.70 M/uL 2.54(L) 2.71(L) 2.79(L) 2.77(L) 2.83(L) 2.66(L) 2.69(L)   Hemoglobin 12.1 - 17.0 g/dL 7. 9(L) 8.6(L) 8.8(L) 8.6(L) 8.8(L) 8.2(L) 8. 3(L)   Hematocrit 36.6 - 50.3 % 25. 6(L) 27. 1(L) 27. 9(L) 29. 2(L) 29. 1(L) 27. 6(L) 26. 5(L)   MCV 80.0 - 99.0 . 8(H) 100. 0(H) 100. 0(H) 105. 4(H) 102. 8(H) 103. 8(H) 98.5   Platelets 052 - 160 K/uL 254 255 253 243 276 230 190   Lymphocytes 12 - 49 % - - - 11(L) 9(L) 9(L) 6(L)   Monocytes 5 - 13 % - - - 8 10 10 5   Eosinophils 0 - 7 % - - - 8(H) 6 5 5   Basophils 0 - 1 % - - - 1 1 1 1   Albumin 3.5 - 5.0 g/dL 4.1 3.6 3.6 3.7 4.1 3.9 4.0   Calcium 8.5 - 10.1 MG/DL 10. 6(H) 10. 9(H) 13. 2(HH) 12. 4(H) 12. 1(H) 11. 5(H) 10. 8(H)   Glucose 65 - 100 mg/dL 223(H) 165(H) 134(H) 201(H) 199(H) 206(H) 189(H)   BUN 6 - 20 MG/DL 30(H) 30(H) 36(H) 23(H) 24(H) 26(H) 25(H)   Creatinine 0.70 - 1.30 MG/DL 2.33(H) 2.32(H) 2.39(H) 1.64(H) 1.67(H) 1.67(H) 1.62(H)   Sodium 136 - 145 mmol/L 137 136 139 135(L) 138 137 135(L)   Potassium 3.5 - 5.1 mmol/L 4.3 4.5 4.8 4.4 4.5 4.3 4.4   TSH 0.36 - 3.74 uIU/mL - - - - - - -   PSA 0.01 - 4.0 ng/mL - - - - - - -   LDH 85 - 241 U/L 262(H) 256(H) 248(H) 285(H) 334(H) 355(H) 419(H)   Some recent data might be hidden     Lab Results   Component Value Date/Time    TSH 3.52 01/28/2023 05:26 AM    TSH 2.12 12/27/2022 02:36 PM    TSH 4.80 (H) 12/06/2022 03:53 AM    TSH 5.39 (H) 10/12/2022 09:10 AM    TSH 3.53 02/03/2022 11:47 AM    TSH 5.790 (H) 11/21/2019 04:45 PM    TSH 3.08 06/22/2018 01:53 PM    TSH 4.250 05/26/2015 09:43 AM       ALLERGY:  Allergies   Allergen Reactions    Ambien [Zolpidem] Other (comments)     Causes extreme confusion        CURRENT MEDICATIONS:    Current Facility-Administered Medications:     labetaloL (NORMODYNE;TRANDATE) injection 5 mg, 5 mg, IntraVENous, Q4H PRN, Duane Crooks, Anibal A, MD, 5 mg at 03/08/23 1127    fluconazole (DIFLUCAN) 200mg/100 mL IVPB (premix), 200 mg, IntraVENous, Q24H, Walker Aponte MD, Last Rate: 100 mL/hr at 03/08/23 1035, 200 mg at 03/08/23 1035    levETIRAcetam (KEPPRA) injection 500 mg, 500 mg, IntraVENous, DAILY, Duane Crooks, Anibal A, MD, 500 mg at 03/08/23 0831    levETIRAcetam (KEPPRA) injection 250 mg, 250 mg, IntraVENous, DIALYSIS PRN, Duane Crooks, Anibal A, MD    insulin NPH (NOVOLIN N, HUMULIN N) injection 10 Units, 10 Units, SubCUTAneous, Q12H, Cathy Sheldon, CNS, 10 Units at 03/08/23 0831    cinacalcet (SENSIPAR) tablet 30 mg, 30 mg, Oral, DAILY WITH Enrique Olson MD, 30 mg at 03/08/23 0831    albumin human 25% (BUMINATE) solution 25 g, 25 g, IntraVENous, DIALYSIS PRN, Mariaelena Summers MD, 25 g at 03/07/23 1824    insulin lispro (HUMALOG) injection, , SubCUTAneous, Q6H, Carolina Burt MD, 2 Units at 03/08/23 1133    0.9% sodium chloride infusion 250 mL, 250 mL, IntraVENous, CONTINUOUS, Parker Motley MD, 250 mL at 03/05/23 0926    hydrALAZINE (APRESOLINE) 20 mg/mL injection 5 mg, 5 mg, IntraVENous, Q6H PRN, Parker Motley MD, 5 mg at 03/08/23 0813    hydrALAZINE (APRESOLINE) tablet 5 mg, 5 mg, Oral, PRN, Parker Motley MD    niCARdipine (CARDENE) 25 mg in 0.9% sodium chloride 250 mL ([id number]), 0-15 mg/hr, IntraVENous, TITRATE, Tarun Rodriguez DO, Stopped at 03/05/23 1925    aspirin chewable tablet 81 mg, 81 mg, Per NG tube, DAILY, Rich Joseph MD, 81 mg at 03/08/23 0831    bicarbonate dialysis (PRISMASOL) BG K 4/Ca 2.5 5000 ml solution, , Extracorporeal, DIALYSIS CONTINUOUS, Dewayne Schultz MD, Last Rate: 1,750 mL/hr at 03/05/23 1106, New Bag at 03/05/23 1106    epoetin heidy-epbx (RETACRIT) injection 10,000 Units, 10,000 Units, SubCUTAneous, Q TUE, THU & SAT, Dewayne Schultz MD, 10,000 Units at 03/07/23 2303    EPINEPHrine (ADRENALIN) 10 mg in 0.9% sodium chloride 250 mL infusion, 0-10 mcg/min, IntraVENous, TITRATE, Nael Johnson MD, Stopped at 03/05/23 0920    propofol (DIPRIVAN) 10 mg/mL infusion, 0-50 mcg/kg/min, IntraVENous, TITRATE, Tasha Mensah MD, Stopped at 03/02/23 1628    HYDROmorphone 30 mg/30 mL (1 mg/mL) infusion, 0.5-1 mg/hr, IntraVENous, CONTINUOUS, Nael Johnson MD, Stopped at 03/08/23 0516    dextrose (D50W) injection syrg 25 g, 25 g, IntraVENous, PRN, Pablo Junior MD, 9 mL at 02/25/23 1225    neomycin-polymyxin-dexamethasone (DEXACINE) 3.5 mg/g-10,000 unit/g-0.1 % ophthalmic ointment, , Both Eyes, BID, Jason Lang MD, Given at 03/08/23 4412    NOREPINephrine (LEVOPHED) 8 mg in 5% dextrose 250mL (32 mcg/mL) infusion, 0.5-16 mcg/min, IntraVENous, TITRATE, Nael Johnson MD, Stopped at 03/07/23 2100    colchicine tablet 0.6 mg, 0.6 mg, Oral, DAILY, Lizette Linton NP, 0.6 mg at 03/08/23 0831    heparin (porcine) 1,000 unit/mL injection 1,700 Units, 1,700 Units, InterCATHeter, DIALYSIS PRN, 1,700 Units at 03/07/23 2053 **AND** heparin (porcine) 1,000 unit/mL injection 1,500 Units, 1,500 Units, InterCATHeter, DIALYSIS PRN, Tarun Rodriguez DO, 1,500 Units at 03/07/23 2052    balsam peru-castor oiL (VENELEX) ointment, , Topical, BID, Tasha Mensah MD, Given at 03/08/23 9818    acetaminophen (TYLENOL) solution 650 mg, 650 mg, Oral, Q4H PRN, Pablo Junior MD, 650 mg at 02/20/23 0401    acetaminophen (TYLENOL) suppository 650 mg, 650 mg, Rectal, Q4H PRN, Pablo Junior MD, 650 mg at 02/17/23 2013    HYDROmorphone (DILAUDID) injection 0.5 mg, 0.5 mg, IntraVENous, Q3H PRN, Luz Maria MOLINA MD, 0.5 mg at 02/22/23 2225    HYDROmorphone (DILAUDID) injection 1 mg, 1 mg, IntraVENous, Q4H PRN, Luz Maria MOLINA MD, 1 mg at 03/08/23 0945    heparin 25,000 units in D5W 250 ml infusion, 12-25 Units/kg/hr, IntraVENous, TITRATE, Parker Motley MD, Stopped at 03/08/23 0515    albumin human 25% (BUMINATE) solution 12.5 g, 12.5 g, IntraVENous, Q6H, Parker Motley MD, Last Rate: 60 mL/hr at 02/27/23 0020, 12.5 g at 03/08/23 1127    bumetanide (BUMEX) injection 0.5 mg, 0.5 mg, IntraVENous, Q4H PRN, Parker Motley MD, 0.5 mg at 02/17/23 1431    glucose chewable tablet 16 g, 4 Tablet, Oral, PRN, Shaila, Lizette B, NP    glucagon (GLUCAGEN) injection 1 mg, 1 mg, IntraMUSCular, PRN, Shaila, Lizette B, NP    sodium chloride (NS) flush 5-40 mL, 5-40 mL, IntraVENous, Q8H, Shaila, Lizette B, NP, 10 mL at 03/08/23 0522    sodium chloride (NS) flush 5-40 mL, 5-40 mL, IntraVENous, PRN, Shaila, Lizette B, NP, 40 mL at 02/17/23 1818    naloxone (NARCAN) injection 0.4 mg, 0.4 mg, IntraVENous, PRN, Shaila, Lizette B, NP    ELECTROLYTE REPLACEMENT NOTE: Nurse to review Serum Potassium and Magnesuim levels and Initiate Electrolyte Replacement Protocol as needed, 1 Each, Other, PRN, Shaila, Lizette B, NP    dextrose 10 % infusion 0-250 mL, 0-250 mL, IntraVENous, PRN, Shaila, Lizette B, NP, Last Rate: 150 mL/hr at 02/24/23 0220, 75 mL at 02/24/23 0220    acetaminophen (TYLENOL) tablet 650 mg, 650 mg, Oral, Q6H PRN, Shaila, Lizette B, NP, 650 mg at 02/17/23 0200    ondansetron (ZOFRAN) injection 4 mg, 4 mg, IntraVENous, Q4H PRN, Shaila, Lizette B, NP    albuterol-ipratropium (DUO-NEB) 2.5 MG-0.5 MG/3 ML, 3 mL, Nebulization, Q6H PRN, Shaila, Lizette B, NP    senna-docusate (PERICOLACE) 8.6-50 mg per tablet 1 Tablet, 1 Tablet, Oral, DAILY, Shaila, Lizette B, NP, 1 Tablet at 03/03/23 0856    polyethylene glycol (MIRALAX) packet 17 g, 17 g, Oral, DAILY, Shaila, Lizette B, NP, 17 g at 03/03/23 0856    bisacodyL (DULCOLAX) suppository 10 mg, 10 mg, Rectal, DAILY PRN, Shaila, Lizette B, NP, 10 mg at 02/22/23 0839    0.45% sodium chloride infusion, 10 mL/hr, IntraVENous, CONTINUOUS, Rishabh Eddy MD, Last Rate: 10 mL/hr at 03/05/23 0751, 10 mL/hr at 03/05/23 0751    DOBUTamine (DOBUTREX) 500 mg/250 mL (2,000 mcg/mL) infusion, 0-10 mcg/kg/min, IntraVENous, TITRATE, Yuliya Johnson MD, Stopped at 02/23/23 1044    dexmedeTOMidine in 0.9 % NaCl (PRECEDEX) 400 mcg/100 mL (4 mcg/mL) infusion soln, 0.1-1.5 mcg/kg/hr, IntraVENous, TITRATE, Yuliya Johnson MD, Last Rate: 2.8 mL/hr at 03/08/23 0701, 0.2 mcg/kg/hr at 03/08/23 0701    0.9% sodium chloride infusion, 9 mL/hr, IntraVENous, CONTINUOUS, Milka Alicia MD, Last Rate: 9 mL/hr at 03/07/23 0754, 9 mL/hr at 03/07/23 0754    PATIENT CARE TEAM:  Patient Care Team:  Elisa Greenwood MD as PCP - General (Family Medicine)  Elisa Greenwood MD as PCP - [facility]  Dena Cedeño MD (Cardiovascular Disease Physician)  Derrick Uribe MD (Gastroenterology)  Milka Alicia MD (Cardiothoracic Surgery)  Najma Rahman MD (Cardiovascular Disease Physician)  Anum Presley MD (Nephrology)  Otilia Mina MD (Pulmonary Disease)  Rishabh Eddy MD ([address] [place] [address] Vascular Surgery)     Thank you for allowing me to participate in this patient's care. Taylor Roberts NP   [address] [place] [address], [address]  Phone: [phone]    Total Critical Care time spent:   40 minutes. There was no overlap with other services        Critical care was necessary to treat or prevent imminent or life threatening deterioration of the following conditions: cardiac failure, respiratory failure and CNS failure or compromise     Services Provided:  1. Telemetry review and 12 lead ECG interpretation  2. Hemodynamic interpretation, assessment, and management  3. Review and interpretation of CXR  4. Review and interpretation of lab values  5. Review and interpretation of microbiologic data and culture results  6. Review of medications and administration  7. Review and interpretation of nutrition requirements and management  8. Discussion of management withother consultants and services  9.  Clinical update to family members

## 2023-03-08 NOTE — PROGRESS NOTES
0800 Bedside shift change report given to [address] (oncoming nurse) by Lliiana Raymond (offgoing nurse). Report included the following information SBAR, Kardex, ED Summary, OR Summary, Procedure Summary, Intake/Output, MAR, Accordion, Recent Results, and Cardiac Rhythm NSR/ST . Assessment completed and LVAD settings noted. 0815 IV hydralazine given for increased MAPs in 110s. No low flow alarms. Precedex remains at 0.2mcg.     0905 NP Lizette at bedside- LVAD pump speed decreased to 4600.    0945 IV dilaudid given for pain- HR and BP elevated. 1100 Patient MAPs continue to be elevated in 90s. Intensivist informed- orders placed for IV labetolol. See MAR.     1215 Patient now vomiting copious amounts of red blood with large clots. Patient suctioned via yankauer and inline suctioned trach. Intensivist and HF NP Lizette informed. Orders given to restart heparin gtt at continuous rate. See MAR. Trach care completed after zofran and dilaudid given. TF held. 1315 Heparin gtt initiated at continuous dose- 400units/hr. RN to check aPTT q6. RN to hold infusion if aPTT is greater than 80.    1415 Lactic Acid resulted at 5.4. Intensivist and NP Lizette informed. Plan to replace R MAC and float swan. Stubben 149 and swan placed by Denisha Lopez MD at bedside. Hastings @ 49cm. Pulmonary wedge pressure: 14. HF NP Lizette informed of PA pressures and CI. Patient received a total of 4mg IV versed for procedure. 1730 Patient continues to throw up clots and vomit. MD informed- keep TF off for now. IV zofran given. 1655 CXR at bedside. 1800 LVAD dressing changed- site unchanged from yesterday. 1930 Bedside shift change report given to 975 Stephanie Mirza (oncoming nurse) by Peter Santizo RN (offgoing nurse). Report included the following information SBAR, Kardex, ED Summary, OR Summary, Procedure Summary, Intake/Output, MAR, Accordion, Recent Results, and Cardiac Rhythm NSR/ST .

## 2023-03-08 NOTE — PROGRESS NOTES
2000[facility] all care assumed. 0100- Per Dr. Brooklyn damon before precedex. Provider updated on current settings of 0.4 of precedex and 0.5mg of dilaudid    0500- Patient bleeding from trach site and inline suction. Large blood clot noted from inline suction. Order to hold heparin for now per Dr. Parker Jones    0800- Bedside and Verbal shift change report given to La Taylor (oncoming nurse) by Loida Graham (offgoing nurse). Report included the following information SBAR, Intake/Output, MAR, Recent Results, Med Rec Status, and Cardiac Rhythm NSR .

## 2023-03-08 NOTE — PROCEDURES
Hemodialysis / [phone]    Vitals Pre Post Assessment Pre Post   BP BP: 114/64 (BP improved. UF increased) (03/07/23 2000) 102/62 LOC sedated sedated   HR Pulse (Heart Rate): 80 (03/07/23 2000) 82 Lungs rhonchi rhonchi   Resp Resp Rate: 20 (03/07/23 2000) 19 Cardiac RRR RRR   Temp Temp: 98.4 °F (36.9 °C) (03/07/23 2000) 97.9 Skin Intact catheter Intact catheter   Weight  Unable to obtain UTO Edema Generalized  Generalized   Tele status yes yes Pain Pain Intensity 1: 0 (03/07/23 1805) 0/10     Orders   Duration: Start: 0525 End: 2105 Total: 3 hrs   Dialyzer: Dialyzer/Set Up Inspection: Anjum Sargent (03/07/23 1805)   K Bath: Dialysate K (mEq/L): 3 (03/07/23 1805)   Ca Bath: Dialysate CA (mEq/L): 2.0 (03/07/23 1805)   Na: Dialysate NA (mEq/L): 140 (03/07/23 1805)   Bicarb: Dialysate HCO3 (mEq/L): 35 (03/07/23 1805)   Target Fluid Removal: Goal/Amount of Fluid to Remove (mL): 1000 mL (03/07/23 1805)     Access   Type & Location: Left non-tunneled catheter   Comments:    Good aspirations/flushes. Dressing changed today by primary nurse.                                     Labs   HBsAg (Antigen) / date:    Negative on 02/18/2023                                           HBsAb (Antibody) / date: Susceptible on 02/18/2023   Source: EPIC   Obtained/Reviewed  Critical Results Called HGB   Date Value Ref Range Status   03/07/2023 8.6 (L) 12.1 - 17.0 g/dL Final     Potassium   Date Value Ref Range Status   03/07/2023 4.5 3.5 - 5.1 mmol/L Final     Calcium   Date Value Ref Range Status   03/07/2023 10.9 (H) 8.5 - 10.1 MG/DL Final     BUN   Date Value Ref Range Status   03/07/2023 30 (H) 6 - 20 MG/DL Final     Creatinine   Date Value Ref Range Status   03/07/2023 2.32 (H) 0.70 - 1.30 MG/DL Final        Meds Given   Name Dose Route   Albumin  25 gm IV   Heparin dwell AP=1.5; =1.7 ml IC          Adequacy / Fluid    Total Liters Process: 50.1   Net Fluid Removed: 1000-444=911 ml       Comments   Time Out Done:   (Time) 1730 Admitting Diagnosis: Congestive Heart Failure   Consent obtained/signed: Informed Consent Verified: Yes (03/07/23 4146)   Machine / RO # Machine Number: [id number] (03/07/23 4518)   Primary Nurse Rpt Pre: Kaya Harley RN   Primary Nurse Rpt Post: Ap Enamorado RN   Pt Education: Procedure   Care Plan: Continue HD as prescribed   Pts outpatient clinic: TBD     Tx Summary   Comments:     Treatment initiated via left I.J. UF goal decreased and albumin given to maintain  BP. Tolerated treatment fairly. All possible blood returned. Catheter ports flushed with NS; heparin instilled as dwell. Sterile caps applied.  Endorsed to primary,   Ap Enamorado RN

## 2023-03-08 NOTE — DIABETES MGMT
Tracheostomy placement with bronchoscopy 3/7  Some bloody emesis today, enteral feeds stopped at noon  Central line being placed now  Lactate 5.4 at 2pm  Levophed off  24h BG pattern: 132-247  On NPH 10 units Q12 and 2 units correctional humalog in the last 24h    Blood glucose is up with rise in lactate. No change in insulin dosing at this time. Do believe that BG pattern will improve with cessation of enteral feeds and do not want to escalate doses at this time.

## 2023-03-08 NOTE — PROCEDURES
[address]  PULMONARY FUNCTION TEST    Name:  Luis Carlos Diggs  MR#:  [id number]  :  1950  ACCOUNT #:  [de-identified]  DATE OF SERVICE:  2023      INDICATIONS:  LVAD workup. FINDINGS:  SPIROMETRY:  Reduced FVC, reduced FEV1, normal FEV1/FVC ratio. IMPRESSION:  Severe restrictive ventilatory defect based on spirometry. Mid expiratory flow rates were reduced and small airways disease cannot be excluded. Lung volumes and diffusion capacity measurement may be beneficial for further evaluation of restrictive lung disease. Clinical correlation is recommended.       Geoffrey Henriquez MD      [id number]  D:  2023 10:11  T:  2023 15:16  JOB #:  [id number]

## 2023-03-08 NOTE — PROGRESS NOTES
Name: Hanna Ingram   MRN: [id number]  : 1950      Assessment:  TANNER on CKD-3a: Suspect cardiorenal effects with needed diuresis. Now has LVAD and  POST OP ATN. Anuric->Requiring CRRT  hypercalcemia/immobilization- also has elevated PTH; may have component of 1ry vs secondary HPT  Possible pancreatitis- with elevated lipase; Hypercalcemia can contribute  Ischemic CM: Recurrent exacerbations. EF 20%. S/p LVAD on 2/15. Required Impella RP for RV support-> attempts to wean not tolerated by patient. CVA  Sepsis  BPH   Well differentiated NET Grade 1: noted on EGD 23  Anemia 2 to CKD:  s/p PRBCS    Left UE basilic and radial vein thrombus  Thrombocytopenia     Was on CVVH. Did iHD on 3/6 and 3/7 again. Ca better. Plan/Recommendations:  Plan HD tomorrow-no acute need today  Ct Sensipar 30 mg via NGT every day (to be given as suspension)  S/p Calcitonin x 4 doses for high Ca  Will hold off on IV Zometa or Pamidronate as last resort  BRIGHT      Subjective: Intubated; sedated    ROS:   UTO    Exam:  Visit Vitals  /73 (BP 1 Location: Left lower arm, BP Patient Position: At rest) Comment: treatment ended.  Blood returned   Pulse (!) 109   Temp 98.9 °F (37.2 °C)   Resp (!) 36   Ht 5' 6\" (1.676 m)   Wt 67.6 kg (149 lb 0.5 oz)   SpO2 100%   BMI 24.05 kg/m²     NAD  +icterus  +trach  Dec BS  LVAD hum  +tr edema      Current Facility-Administered Medications   Medication Dose Route Frequency Last Admin    fluconazole (DIFLUCAN) 200mg/100 mL IVPB (premix)  200 mg IntraVENous Q24H 200 mg at 23 1035    levETIRAcetam (KEPPRA) injection 500 mg  500 mg IntraVENous DAILY 500 mg at 23 0831    levETIRAcetam (KEPPRA) injection 250 mg  250 mg IntraVENous DIALYSIS PRN      insulin NPH (NOVOLIN N, HUMULIN N) injection 10 Units  10 Units SubCUTAneous Q12H 10 Units at 03/08/23 0831    cinacalcet (SENSIPAR) tablet 30 mg  30 mg Oral DAILY WITH BREAKFAST 30 mg at 03/08/23 0831    albumin human 25% (BUMINATE) solution 25 g  25 g IntraVENous DIALYSIS PRN 25 g at 03/07/23 1824    insulin lispro (HUMALOG) injection   SubCUTAneous Q6H      0.9% sodium chloride infusion 250 mL  250 mL IntraVENous CONTINUOUS 250 mL at 03/05/23 0926    hydrALAZINE (APRESOLINE) 20 mg/mL injection 5 mg  5 mg IntraVENous Q6H PRN 5 mg at 03/08/23 0813    hydrALAZINE (APRESOLINE) tablet 5 mg  5 mg Oral PRN      niCARdipine (CARDENE) 25 mg in 0.9% sodium chloride 250 mL ([id number])  0-15 mg/hr IntraVENous TITRATE Stopped at 03/05/23 1925    aspirin chewable tablet 81 mg  81 mg Per NG tube DAILY 81 mg at 03/08/23 0831    bicarbonate dialysis (PRISMASOL) BG K 4/Ca 2.5 5000 ml solution   Extracorporeal DIALYSIS CONTINUOUS New Bag at 03/05/23 1106    epoetin heidy-epbx (RETACRIT) injection 10,000 Units  10,000 Units SubCUTAneous Q TUE, THU & SAT 10,000 Units at 03/07/23 2303    EPINEPHrine (ADRENALIN) 10 mg in 0.9% sodium chloride 250 mL infusion  0-10 mcg/min IntraVENous TITRATE Stopped at 03/05/23 0920    propofol (DIPRIVAN) 10 mg/mL infusion  0-50 mcg/kg/min IntraVENous TITRATE Stopped at 03/02/23 1628    HYDROmorphone 30 mg/30 mL (1 mg/mL) infusion  0.5-1 mg/hr IntraVENous CONTINUOUS Stopped at 03/08/23 0516    dextrose (D50W) injection syrg 25 g  25 g IntraVENous PRN 9 mL at 02/25/23 1225    neomycin-polymyxin-dexamethasone (DEXACINE) 3.5 mg/g-10,000 unit/g-0.1 % ophthalmic ointment   Both Eyes BID Given at 03/08/23 0832    NOREPINephrine (LEVOPHED) 8 mg in 5% dextrose 250mL (32 mcg/mL) infusion  0.5-16 mcg/min IntraVENous TITRATE Stopped at 03/07/23 2100    colchicine tablet 0.6 mg  0.6 mg Oral DAILY 0.6 mg at 03/08/23 0831    heparin (porcine) 1,000 unit/mL injection 1,700 Units  1,700 Units InterCATHeter DIALYSIS PRN 1,700 Units at 03/07/23 2053    And    heparin (porcine) 1,000 unit/mL injection 1,500 Units 1,500 Units InterCATHeter DIALYSIS PRN 1,500 Units at 03/07/23 2052    balsam peru-castor oiL (VENELEX) ointment   Topical BID Given at 03/08/23 7927    acetaminophen (TYLENOL) solution 650 mg  650 mg Oral Q4H  mg at 02/20/23 0401    acetaminophen (TYLENOL) suppository 650 mg  650 mg Rectal Q4H  mg at 02/17/23 2013    HYDROmorphone (DILAUDID) injection 0.5 mg  0.5 mg IntraVENous Q3H PRN 0.5 mg at 02/22/23 2225    HYDROmorphone (DILAUDID) injection 1 mg  1 mg IntraVENous Q4H PRN 1 mg at 03/08/23 0945    heparin 25,000 units in D5W 250 ml infusion  12-25 Units/kg/hr IntraVENous TITRATE Stopped at 03/08/23 0515    albumin human 25% (BUMINATE) solution 12.5 g  12.5 g IntraVENous Q6H 12.5 g at 03/08/23 0520    bumetanide (BUMEX) injection 0.5 mg  0.5 mg IntraVENous Q4H PRN 0.5 mg at 02/17/23 1431    glucose chewable tablet 16 g  4 Tablet Oral PRN      glucagon (GLUCAGEN) injection 1 mg  1 mg IntraMUSCular PRN      sodium chloride (NS) flush 5-40 mL  5-40 mL IntraVENous Q8H 10 mL at 03/08/23 0522    sodium chloride (NS) flush 5-40 mL  5-40 mL IntraVENous PRN 40 mL at 02/17/23 1818    naloxone (NARCAN) injection 0.4 mg  0.4 mg IntraVENous PRN      ELECTROLYTE REPLACEMENT NOTE: Nurse to review Serum Potassium and Magnesuim levels and Initiate Electrolyte Replacement Protocol as needed  1 Each Other PRN      dextrose 10 % infusion 0-250 mL  0-250 mL IntraVENous PRN 75 mL at 02/24/23 0220    acetaminophen (TYLENOL) tablet 650 mg  650 mg Oral Q6H  mg at 02/17/23 0200    ondansetron (ZOFRAN) injection 4 mg  4 mg IntraVENous Q4H PRN      albuterol-ipratropium (DUO-NEB) 2.5 MG-0.5 MG/3 ML  3 mL Nebulization Q6H PRN      senna-docusate (PERICOLACE) 8.6-50 mg per tablet 1 Tablet  1 Tablet Oral DAILY 1 Tablet at 03/03/23 0856    polyethylene glycol (MIRALAX) packet 17 g  17 g Oral DAILY 17 g at 03/03/23 0856    bisacodyL (DULCOLAX) suppository 10 mg  10 mg Rectal DAILY PRN 10 mg at 02/22/23 0839    0.45% sodium chloride infusion  10 mL/hr IntraVENous CONTINUOUS 10 mL/hr at 03/05/23 0751    DOBUTamine (DOBUTREX) 500 mg/250 mL (2,000 mcg/mL) infusion  0-10 mcg/kg/min IntraVENous TITRATE Stopped at 02/23/23 1044    dexmedeTOMidine in 0.9 % NaCl (PRECEDEX) 400 mcg/100 mL (4 mcg/mL) infusion soln  0.1-1.5 mcg/kg/hr IntraVENous TITRATE 0.2 mcg/kg/hr at 03/08/23 0701    0.9% sodium chloride infusion  9 mL/hr IntraVENous CONTINUOUS 9 mL/hr at 03/07/23 0754       Labs/Data:    Lab Results   Component Value Date/Time    WBC 7.6 03/08/2023 04:14 AM    HGB 7.9 (L) 03/08/2023 04:14 AM    HCT 25.6 (L) 03/08/2023 04:14 AM    PLATELET 077 31/68/7763 04:14 AM    .8 (H) 03/08/2023 04:14 AM       Lab Results   Component Value Date/Time    Sodium 137 03/08/2023 04:11 AM    Potassium 4.3 03/08/2023 04:11 AM    Chloride 104 03/08/2023 04:11 AM    CO2 23 03/08/2023 04:11 AM    Anion gap 10 03/08/2023 04:11 AM    Glucose 223 (H) 03/08/2023 04:11 AM    BUN 30 (H) 03/08/2023 04:11 AM    Creatinine 2.33 (H) 03/08/2023 04:11 AM    BUN/Creatinine ratio 13 03/08/2023 04:11 AM    GFR est AA 46 (L) 06/23/2022 09:40 AM    GFR est non-AA 38 (L) 06/23/2022 09:40 AM    eGFR 29 (L) 03/08/2023 04:11 AM    eGFR 69 01/25/2023 11:55 AM    Calcium 10.6 (H) 03/08/2023 04:11 AM       Wt Readings from Last 3 Encounters:   03/08/23 67.6 kg (149 lb 0.5 oz)   01/25/23 55.8 kg (123 lb)   01/23/23 54.9 kg (121 lb)         Intake/Output Summary (Last 24 hours) at 3/8/2023 1037  Last data filed at 3/8/2023 1000  Gross per 24 hour   Intake 2815.04 ml   Output 400 ml   Net 2415.04 ml         Patient seen and examined. Chart reviewed. Labs, data and other pertinent notes reviewed in last 24 hrs.     PMH/SH/FH reviewed and unchanged compared to H&P      Marbella Briceno MD

## 2023-03-08 NOTE — PROGRESS NOTES
SLP consult received and appreciated. Patient is currently on the ventilator and therefore will confer with RT as to appropriateness for in-line PMV.  Of note, in-line PMV parameters are as follows:    -Can tolerate cuff deflation on the ventilator  -FiO2 below 50%  -PEEP less than 10  -PIP less than 40  -RR of 18 or less    Thank you,  YANDEL HeadEd, [address] [facility]  Speech-Language Pathologist

## 2023-03-08 NOTE — PROCEDURES
Procedure Note - Central Venous Access:   Performed by Di Juárez MD  Diagnosis: Cardiogenic shock  Insertion Date: 03/08/23  Time:4:11 PM  Obtained Consent? yes; informed   Procedure Location:  CVICU. Immediately prior to the procedure, the patient was reevaluated and found suitable for the planned procedure and any planned medications. Immediately prior to the procedure a time out was called to verify the correct patient, procedure, equipment, staff, and marking as appropriate. Central line Bundle:  Full sterile barrier precautions used. 7-Step Sterility Protocol followed. (cap, mask sterile gown, sterile gloves, large sterile sheet, hand hygiene, 2% chlorhexidine for cutaneous antisepsis)  5 mL 1% Lidocaine placed at insertion site. Patient positioned in Trendelenburg?yes   The site was prepped with ChloraPrep and Sterile draping. Catheter inserted into a new site. Using Seldinger technique a 9Fr dual lumen Arrow Cordis/Introducer was placed in the Right, Internal Jugular Vein via direct cannulation with 1 number of attempts for Monitoring, Blood Drawing, and IV Access. Ultrasound Guidance was utilized. There was good dark, non-pulsatile blood return in all ports. Femoral Site? no.   Catheter secured. Biopatch/CHG bio-occlusive dressing in place? yes. The following complications were encountered: None. A flow guided PAC was then placed through introducer sheath    PAWP: 13 at 52cm    PAC locket at 48cm    A follow-up chest x-ray was ordered post procedure. The procedure was tolerated well.     Di Juárez MD  Critical Care Medicine  [facility]

## 2023-03-08 NOTE — PROGRESS NOTES
CRITICAL CARE NOTE      Name: Lauren Reina   : 1950   MRN: [id number]   Date: 3/8/2023      Reason for ICU Admission: Acute on chronic HFrEF     ICU PROBLEM LIST   Acute on chronic HFrEF (20%) NYHA IIIb/IV (D) on home inotropic support, life vest s/p LVAD  TANNER on CKD3, on CRRT  CHB post op, resolved  Aflutter w/ RVR  Acute on chronic hypoxemic respiratory failure, s/p trach placement  CAP  CAD  Gastric neuroendocrine tumor  Hx of LUE DVT  DM  PAD  TRISTAN  BPH w/ urinary retention requiring chronic donnelly    HISTORY OF PRESENT ILLNESS:   Pt is a 71 y.o M w/ PMH as noted above admitted to [facility] on  due to ongoing symptoms secondary to his HFrEF. Treated for possible CAP, and diuresed for acute on chronic HFrEF. Nephrology following for TANNER on CKD 3. AHF team recommended transfer to CVICU for [facility] placement and ongoing LVAD workup, with placement 2/15. Pt extubated , however with development of worsening renal dysfunction overnight and unable to tolerate CRRT so was reintubated and taken for Impella RP placement for right-sided support in a.m. of  and CRRT resumed. Impella removed on 3/1. Overnight 3/1-3/1 CVA noted with SAH.  3/2 SAH enlarged. Held anticoagulation. Failed extubation 3/2. Trach placed 3/7    24 HOUR EVENTS:   HD yesterday evening with 400ml UF. Heparin held o/n due to bleeding at trach site, TF held for N/V. Heparin resumed this afternoon. Pt more awake today after anxiolytics held. LVAD RPMs decreased to 4600 due to small LV volume, flow remains around 3L. Lct uptrended, decision made to remove RIJ CVC, place introducer and float PAC for closer hemodynamics.      NEUROLOGICAL:    PRN anxiolytics, pain regimen  Avoid sedation now post trach  CT with resolving SAH, will repeat imaging after heparin therapeutic   Delirium precautions    PULMONOLOGY:   Lung protective ventilation, on minimal vent support at this time  S/p Trach placement   Will start PSV trials  SLP consulted for in-line PMV trial  Monitor blood gas  Monitor bleeding at [facility] site    CARDIOVASCULAR:   MAP goal >65 <85, PRN vasopressors, antihypertensives  If BP remains elevated will start PO BP regimen  Trend LCT  Hemodynamic monitoring  LVAD  3L @ 4600 RPM  CVP goal 10-12  C/w ASA, statin  Goal euvolemia  Unable to tolerate BB, ARNI/ARB due to hypotension, aldactone held 2/2 elevated K     GASTROINTESTINAL:   NPO, on tube feeds  PPI   Trend LFTs  Tbili downtrend   ? Pancreatitis, possibly 2/2 hyper Ca, c/w calcitonin and sensipar    RENAL/ELECTROLYTE/FLUIDS:   Strict I/Os,  goal  euvolemia  HD per nephrology   Monitor for renal recovery  Monitor RFP  Mg/Phos/K >2/3/4  Vasquez to remain in place    ENDOCRINE:    SSI, Basal insulin   Hypoglycemic protocol  Glucose goal 120-180    HEMATOLOGY/ONCOLOGY:   Resume heparin  Hemoglobin goal greater than 7, platelet goal greater than 50  EPO weekly  Gastric neuroendocrine tumor, well differentiated, good prognosis per onc.      ID/MICRO:   ID consulted, stopped vanc/flagyl, zosyn  Plan to stop fluconazole today  Bcx without growth at this time  Urine with Candida s/p fluconazole    ICU DAILY CHECKLIST     Code Status:Full  DVT Prophylaxis:heparin  T/L/D: trach, PAC,  PIV,  Vasquez,  V  wire, LVAD drive line  SUP: PPI  Diet: NPO, TF  Activity Level:ad jose f  ABCDEF Bundle/Checklist Completed:Yes  Disposition: Stay in ICU  Multidisciplinary Rounds Completed:  yes  Patient/Family Updated: Yes    [facility]   2/3 Transferred to CVICU for [facility] placement  2/7 started on dobutamine as adjunct to milrinone  2/15 LVAD implant  2/16 extubated  2/18 Impella RP placement  3/1 Impella removed  3/1-2 SAH on CT  3/3 RE-intubated  3/6 Blakes removed, transitioned to HD  3/7 Trach placment    SUBJECTIVE:     As noted above    Review of Systems:     Unable to perform due to patient intubated    OBJECTIVE:     Labs and Data: Reviewed 03/08/23  Medications: Reviewed 23  Imaging: Reviewed 23    General - sedated, intubated chronically ill appearing  HEENT- pupils equal, nystagmus, anicteric  Neuro - sedated, no focal deficit  CV - Paced,  VAD hum, post sternotomy  Resp - trach, coarse b/l  Abd - soft, distended, nontender, no guarding  MSK- intact, no sacral ulcer, anasarca  LVAD driveline in left chest    Visit Vitals  /73 (BP 1 Location: Left lower arm, BP Patient Position: At rest) Comment: treatment ended. Blood returned   Pulse 97   Temp 99.3 °F (37.4 °C)   Resp 25   Ht 5' 6\" (1.676 m)   Wt 67.6 kg (149 lb 0.5 oz)   SpO2 100%   BMI 24.05 kg/m²    O2 Flow Rate (L/min): 20 l/min O2 Device: Tracheostomy, Ventilator Temp (24hrs), Av.4 °F (36.9 °C), Min:97.9 °F (36.6 °C), Max:99.3 °F (37.4 °C)    CVP (mmHg): 15 mmHg (23 1600)      Intake/Output:     Intake/Output Summary (Last 24 hours) at 3/8/2023 1630  Last data filed at 3/8/2023 1600  Gross per 24 hour   Intake 2106.3 ml   Output 400 ml   Net 1706.3 ml       Imaging    23    ECHO ADULT FOLLOW-UP OR LIMITED 2023    Interpretation Summary    Left Ventricle: EF by visual approximation is 20%. Left ventricle is mildly dilated. Mitral Valve: Moderately thickened leaflet, at the anterior and posterior leaflets. Moderately calcified leaflet. Moderate regurgitation. Left Atrium: Left atrium is moderately dilated. Technical qualifiers: Echo study was technically difficult with poor endocardial visualization. Contrast used: Definity. Limited study, not all structures viewed    Signed by: Merlene Hankins MD on 2023  4:39 PM       Pertinent imaging reviewed and interpreted as noted above    CRITICAL CARE DOCUMENTATION  I had a face to face encounter with the patient, reviewed and interpreted patient data including clinical events, labs, images, vital signs, I/O's, and examined patient.   I have discussed the case and the plan and management of the patient's care with the consulting services, the bedside nurses and the respiratory therapist.      NOTE OF PERSONAL INVOLVEMENT IN CARE   This patient has a high probability of imminent, clinically significant deterioration, which requires the highest level of preparedness to intervene urgently. I participated in the decision-making and personally managed or directed the management of the following life and organ supporting interventions that required my frequent assessment to treat or prevent imminent deterioration. I personally spent 35 minutes of critical care time. This is time spent at this critically ill patient's bedside actively involved in patient care as well as the coordination of care. This does not include any procedural time which has been billed separately. Walker Werner MD  Staff [address] [facility]

## 2023-03-09 ENCOUNTER — TELEPHONE (OUTPATIENT)
Dept: FAMILY MEDICINE CLINIC | Age: 73
End: 2023-03-09

## 2023-03-09 NOTE — PROGRESS NOTES
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 Tania Bernabe  C/b RV failure requiring Impella RP placed 23 and discontinued 3/1/23  C/b acute on chronic renal failure, requiring CRRT  C/b hepatic failure, TB 12 (peak 15.3)  C/b lactic acidosis, persistent  C/b persistent septic shock c/b vasodilation and high outputs, on multiple antibiotics, procalcitonin persistently elevated  C/b R SUPA occlusion with small area of matched perfusion defect - subacute infarct c/b left sided hemiparesis  C/b pancreatitis  C/b failure to extubate x 2; anticipating tracheostomy today  Patient was approved for LVAD implantation as destination therapy at [facility] 2/3/23; the following have been identified and d/w patient as relative concerns pertaining to LVAD candidacy: small body surface area, cachexia, BMI 18, malnutrition, frailty, advanced age, muscular deconditioning, PVD (fingertips), urinary retention requiring donnelly catheter, neuroendocrine tumor class 1 requiring treatment after LVAD, mild neurocognitive dysfunction, chronic kidney disease and hearing loss requiring hearing aid  Family meeting today at 200 with Dr. Michael Renner:  Continue LVAD at 4600rpm  Maintain PA cath for hemodynamic monitoring   Start scheduled antihypertensive regimen after tracheostomy   No beta-blockers due to hypotension and RV conditioning prior to LVAD  Cannot tolerate ARB/ARNi due to hypotension and upcoming surgical procedure   Cannot tolerate spironolactone due to hyperkalemia  Cannot tolerate jardiance due to significant diuresis on smallest dose/contributed to IVVD  Previously discontinued corlanor due to SVT  Digoxin stopped d/t risk for toxicity with amio loading  Discontinued amio due to intermitted heart blocks under pacemaker  HD per nephrology T/TR/SAT ; goal CVP 10-12  Keep K+ >4 and Mg >2   ID recommendations appreciated- will culture today   Continue colchicine 0.6mg daily for possible pericarditis  Hold ASA d/t CVA   Continue to hold coumadin  Continue heparin gtt per protocol  Cannot tolerate statins due to abnormal LFT  Management of tube feeds in light of pancreatitis per intensivist  Recheck lipase tomorrow   Continue bowel regimen   Daily dressing changes to Drive line exit site  Pulmonary hygiene- continue SBT   VAD education with caregivers/patient when able by VAD coordinator  D/w Dr. Bernard Joiner and bedside staff      INTERVAL HISTORY:  T Max 100.5  PA cath replaced, CI > 2  MAPs 60-90s, HR 90-110s  CVP 11-15  I/O net positive 1L  Hgb stable at 7.9  Ca down to 11.9  Cr 3.53  TB up to 14  LFTs trending up today   PBNP up to 433[address]  Lactic  2.7- trending down   PCT trending up to 4.17       IMPRESSION:  Acute on chronic combined systolic/diastolic  heart failure  Stage D, NYHA class IV symptoms   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, LVEF 20% (by echo 1/2023) and 23% (by cMRI 1/3/23)  Cardiac MRI suggestive of ischemic cardiomyopathy  PYP equivocal  S/p HeartMate 3  LVAD-DT (2/15/23)  C/b RV failure requiring Impella RP placed 2/18/23 and discontinued 3/1/23  C/b acute on chronic renal failure, requiring CRRT  C/b hepatic failure, TB 12 (peak 15.3)  C/b lactic acidosis, persistent  C/b persistent septic shock c/b vasodilation and high outputs, on multiple antibiotics, procalcitonin persistently elevated  C/b R SUPA occlusion with small area of matched perfusion defect - subacute infarct c/b left sided hemiparesis  C/b pancreatitis  C/b failure to extubate x 2; anticipating tracheostomy this week  Coronary artery disease  CAD s/p CABG x 2: further disease best managed medically due to small vessel size   At risk of sudden cardiac death  Peripheral arterial disease  Bilateral hydronephrosis s/p andrzej  Cardiac risk factors:  HTN  HL  TRISTAN, STOP-BANG 4  DM2  CKD, stage 3  MOCA from 2/9 19/30, consistent with mild cognitive impairment  Hard of hearing  Gastric Neuroendocrine Tumor, elevated gastrin and chromogranin A  Septic shock, improving   Left sided weakness noted night 3/1. +SAH and subacute occlusion distal R cerebral artery         LIFE GOALS: not readdressed today   Patient's personal goals included: having a few more years with family  Important upcoming milestones or family events: None at this time   The patient identified the following as important for living well: being at home, not being SOB            CXR (3/5/23) mild pulmonary edema. Small pleural effusions and bibasilar airspace opacities. Head CTA (3/2/23) Occlusion distal right anterior cerebral artery, with associated small area of matched perfusion defect and cortical enhancement, consistent with subacute infarct. Diminutive and irregular right vertebral artery, occluded at the V3-4 junction, age indeterminate extensive multifocal atherosclerosis in the intracranial and extracranial circulation. CARDIAC IMAGING:   ECHO- 3/6/23       Left Ventricle: Severely reduced left ventricular systolic function with a visually estimated EF of 15 - 20%. Left ventricle is moderately dilated. LVAD is present. Right Ventricle: Right ventricle is moderately dilated. Mildly reduced systolic function. ECHO (3/2/23) EF 20-25%; LV mod dilated; severe global hypokinesis; RV mod dilated; mildly reduced systolic function;    Echo 2/19/23    Left Ventricle: Severely reduced left ventricular systolic function with a visually estimated EF of 20 - 25%. Not well visualized. Left ventricle size is normal. Increased wall thickness.  Unable to assess wall motion. Abnormal diastolic function. LVAD is present. LVAD inflow cannula was not well visualized. Right Ventricle: Not well visualized. Right ventricle is mildly dilated. Moderately reduced systolic function. TAPSE is abnormal.    Mitral Valve: Severe annular calcification at the anterior and posterior leaflets of the mitral valve. Mild regurgitation. Left Atrium: Left atrium is dilated. Right Atrium: Right atrium is dilated. Technical qualifiers: Echo study was technically difficult and technically difficult with poor endocardial visualization. Echo 1/27/23    Left Ventricle: EF by visual approximation is 20%. Left ventricle is mildly dilated. Mitral Valve: Moderately thickened leaflet, at the anterior and posterior leaflets. Moderately calcified leaflet. Moderate regurgitation. Left Atrium: Left atrium is moderately dilated. Technical qualifiers: Echo study was technically difficult with poor endocardial visualization. Contrast used: Definity. Limited study, not all structures viewed     Echo 1/9/23   Left Ventricle: Severely reduced left ventricular systolic function with a visually estimated EF of 25 - 30%. Left ventricle size is normal. Mildly increased wall thickness. Echo 12/26/22    Left Ventricle: Severely reduced left ventricular systolic function with a visually estimated EF of 25 - 30%. Left ventricle size is normal. Normal wall thickness. There are regional wall motion abnormalities. Grade II diastolic dysfunction with increased LAP. Right Ventricle: Moderately reduced systolic function. TAPSE is abnormal. TAPSE is 1.1 cm. Aortic Valve: Mild stenosis of the aortic valve. AV peak gradient is 13 mmHg. AV peak velocity is 1.8 m/s. Mitral Valve: Not well visualized. Moderate annular calcification at the posterior leaflet of the mitral valve. Mild to moderate regurgitation. Tricuspid Valve: Mildly elevated RVSP.     Left Atrium: Left atrium is moderately dilated. 12/8/22    Left Ventricle: Moderately reduced left ventricular systolic function with a visually estimated EF of 35 - 40%. Severe hypokinesis of the following segments: mid anteroseptal, apical anterior, apical septal, apical inferior and apical lateral. Severe hypokinesis of the apex. Mitral Valve: Severely thickened leaflet, at the anterior and posterior leaflets. Severely calcified leaflet, at the anterior and posterior leaflets. Mild annular calcification of the mitral valve. Moderate regurgitation. Left Atrium: Left atrium is mildly dilated. Contrast used: Definity. limited study     EKG 12/22/22 ST, Biatria enlargement, marked ST abnormality     [facility] 12/6/22  1. Normal LVEDP  2. Severe native multivessel coronary artery disease  3. Patent LIMA to LAD and vein graft to distal RCA  4. Recurrent ISR in OM1 stent with now 60 to 70% restenosis  5. Recoil of left main and circumflex stent with now recurrent 40 to 50% stenosis. 6.  Progression of ostial left main disease now to about 60% stenosis  7. Progression of disease in jailed first marginal branch now with diffuse 90% stenosis  8.   High-grade stenosis in the mid to distal right potential femoral artery treated with 6 x 40 mm impact drug-coated balloon angioplasty to reduce the stenosis to less than 40%        HEMODYNAMICS:  RHC 1/9/23  PA 20/9, RA 3, PCWP 8, CI 1.8     CPEST too ill   6MW 300 feet    LVAD INTERROGATION:  Device interrogated in person  Device function normal, normal flow, no events  LVAD   Pump Speed (RPM): 4600  Pump Flow (LPM): 3.1  MAP: 80  PI (Pulsitility Index): 6.4  Power: 2.9   Test: Yes  Back Up  at Bedside & Labeled: Yes  Power Module Test: Yes  Driveline Site Care: No  Driveline Dressing: Clean, Dry, and Intact  Testing  Alarms Reviewed: Yes  Back up SC speed: 4600  Back up Low Speed Limit: 4200  Emergency Equipment with Patient?: Yes  Emergency procedures reviewed?: Yes  Drive line site inspected?: No  Drive line intergrity inspected?: Yes  Drive line dressing changed?: No    PHYSICAL EXAM:  Visit Vitals  /73 (BP 1 Location: Left lower arm, BP Patient Position: At rest) Comment: treatment ended. Blood returned   Pulse (!) 101   Temp 99.5 °F (37.5 °C)   Resp 26   Ht 5' 6\" (1.676 m)   Wt 155 lb 13.8 oz (70.7 kg)   SpO2 100%   BMI 25.16 kg/m²     Hemodynamics:   CO: CO (l/min): 4.2 l/min   CI: CI (l/min/m2): 2.4 l/min/m2   CVP: CVP (mmHg): 11 mmHg (03/09/23 1000)   SVR: SVR (dyne*sec)/cm5: 1182 (dyne*sec)/cm5 (03/09/23 7530)   PAP Systolic: PAP Systolic: 39 (10/99/35 8137)   PAP Diastolic: PAP Diastolic: 15 (32/35/28 7273)   PVR:     SV02: SVO2 (%): 68 % (03/09/23 1000)   SCV02:        Physical Assessment:   Physical Exam  Vitals and nursing note reviewed. Constitutional:       Appearance: He is ill-appearing. Cardiovascular:      Rate and Rhythm: Regular rhythm. Tachycardia present. Heart sounds: Normal heart sounds. No murmur heard. Comments: + VAD hum  Pulmonary:      Breath sounds: Rhonchi present. Comments: intubated  Abdominal:      General: There is distension. Palpations: Abdomen is soft. Musculoskeletal:         General: No swelling. Skin:     General: Skin is warm and dry. Coloration: Skin is jaundiced. Neurological:      Mental Status: He is alert.       Comments: sedated              REVIEW OF SYSTEMS:  Review of Systems   Unable to perform ROS: Acuity of condition      PAST MEDICAL HISTORY:  Past Medical History:   Diagnosis Date    CAD (coronary artery disease) 11/10/2016    NSTEMI & 2 stents    Deafness 10/28/2012    DM (diabetes mellitus) ([facility] [address]     Elevated cholesterol     Hypertension     NSTEMI (non-ST elevated myocardial infarction) ([facility] [address] 11/10/2016       PAST SURGICAL HISTORY:  Past Surgical History:   Procedure Laterality Date    COLONOSCOPY N/A 6/28/2018    COLONOSCOPY performed by Marleny Giron MD at [facility] ENDOSCOPY    COLONOSCOPY N/A 1/18/2023    COLONOSCOPY performed by Nitin Zaidi MD at [facility] ENDOSCOPY    [address]  11/11/2016    2 stents       FAMILY HISTORY:  Family History   Problem Relation Age of Onset    Heart Disease Father     Heart Attack Father     Hypertension Mother     Elevated Lipids Brother     Elevated Lipids Brother     No Known Problems Sister     Elevated Lipids Brother     No Known Problems Son     No Known Problems Daughter     Anesth Problems Neg Hx        SOCIAL HISTORY:  Social History     Socioeconomic History    Marital status:    Tobacco Use    Smoking status: Never     Passive exposure: Never    Smokeless tobacco: Never   Vaping Use    Vaping Use: Never used   Substance and Sexual Activity    Alcohol use: Yes     Alcohol/week: 2.0 standard drinks     Types: 1 Cans of beer, 1 Shots of liquor per week     Comment: rarely    Drug use: No    Sexual activity: Yes     Social Determinants of Health     Financial Resource Strain: Medium Risk    Difficulty of Paying Living Expenses: Somewhat hard   Food Insecurity: Food Insecurity Present    Worried About Running Out of Food in the Last Year: Never true    Ran Out of Food in the Last Year: Often true       LABORATORY RESULTS:     Labs Latest Ref Rng & Units 3/9/2023 3/8/2023 3/8/2023 3/8/2023 3/7/2023 3/6/2023 3/5/2023   WBC 4.1 - 11.1 K/uL 11.0 - 7.6 - 7.4 7.4 7.0   RBC 4.10 - 5.70 M/uL 2.53(L) - 2.54(L) - 2.71(L) 2.79(L) 2.77(L)   Hemoglobin 12.1 - 17.0 g/dL 7. 9(L) 8.0(L) 7. 9(L) - 8. 6(L) 8.8(L) 8.6(L)   Hematocrit 36.6 - 50.3 % 25. 7(L) 25. 5(L) 25. 6(L) - 27. 1(L) 27. 9(L) 29. 2(L)   MCV 80.0 - 99.0 . 6(H) - 100. 8(H) - 100. 0(H) 100. 0(H) 105. 4(H)   Platelets 057 - 379 K/uL 358 - 254 - 255 253 243   Lymphocytes 12 - 49 % 11(L) - - - - - 11(L)   Monocytes 5 - 13 % 12 - - - - - 8   Eosinophils 0 - 7 % 0 - - - - - 8(H)   Basophils 0 - 1 % 1 - - - - - 1   Albumin 3.5 - 5.0 g/dL 4.0 - - 4.1 3.6 3.6 3.7   Calcium 8.5 - 10.1 MG/DL 11. 9(H) - - 10. 6(H) 10. 9(H) 13. 2(HH) 12. 4(H)   Glucose 65 - 100 mg/dL 126(H) - - 223(H) 165(H) 134(H) 201(H)   BUN 6 - 20 MG/DL 56(H) - - 30(H) 30(H) 36(H) 23(H)   Creatinine 0.70 - 1.30 MG/DL 3.53(H) - - 2.33(H) 2.32(H) 2.39(H) 1.64(H)   Sodium 136 - 145 mmol/L 137 - - 137 136 139 135(L)   Potassium 3.5 - 5.1 mmol/L 5.0 - - 4.3 4.5 4.8 4.4   TSH 0.36 - 3.74 uIU/mL - - - - - - -   PSA 0.01 - 4.0 ng/mL - - - - - - -   LDH 85 - 241 U/L 357(H) - - 262(H) 256(H) 248(H) 285(H)   Some recent data might be hidden     Lab Results   Component Value Date/Time    TSH 3.52 01/28/2023 05:26 AM    TSH 2.12 12/27/2022 02:36 PM    TSH 4.80 (H) 12/06/2022 03:53 AM    TSH 5.39 (H) 10/12/2022 09:10 AM    TSH 3.53 02/03/2022 11:47 AM    TSH 5.790 (H) 11/21/2019 04:45 PM    TSH 3.08 06/22/2018 01:53 PM    TSH 4.250 05/26/2015 09:43 AM       ALLERGY:  Allergies   Allergen Reactions    Ambien [Zolpidem] Other (comments)     Causes extreme confusion        CURRENT MEDICATIONS:    Current Facility-Administered Medications:     oxyCODONE IR (ROXICODONE) tablet 5 mg, 5 mg, Oral, Q4H PRN, Taz Younger G, DO    oxyCODONE IR (ROXICODONE) tablet 10 mg, 10 mg, Oral, Q4H PRN, Melvern Claude, Collin G, DO    pantoprazole (PROTONIX) 40 mg in 0.9% sodium chloride 10 mL injection, 40 mg, IntraVENous, DAILY, Walker Rose MD, 40 mg at 03/09/23 0926    [START ON 3/10/2023] cinacalcet (SENSIPAR) tablet 60 mg, 60 mg, Oral, DAILY WITH BREAKFAST, Lucie Davenport MD    labetaloL (NORMODYNE;TRANDATE) injection 5 mg, 5 mg, IntraVENous, Q4H PRN, Ramos MOLINA MD, 5 mg at 03/09/23 0307    heparin 25,000 units in D5W 250 ml infusion, 400 Units/hr, IntraVENous, CONTINUOUS, Lizette Linton NP, Last Rate: 4 mL/hr at 03/09/23 0757, 400 Units/hr at 03/09/23 0757    levETIRAcetam (KEPPRA) injection 250 mg, 250 mg, IntraVENous, Once per day on Tue Thu Sat, Walker Aponte MD    0.9% sodium chloride infusion, 14 mL/hr, IntraVENous, CONTINUOUS, Allen MOLINA MD, Last Rate: 14 mL/hr at 03/09/23 0757, 14 mL/hr at 03/09/23 0757    levETIRAcetam (KEPPRA) injection 500 mg, 500 mg, IntraVENous, DAILY, Walker Pleitez MD, 500 mg at 03/09/23 0927    insulin NPH (NOVOLIN N, HUMULIN N) injection 10 Units, 10 Units, SubCUTAneous, Q12H, Cathy Sheldon CNS, 10 Units at 03/08/23 0831    albumin human 25% (BUMINATE) solution 25 g, 25 g, IntraVENous, DIALYSIS PRN, Joseph Pelayo MD, 25 g at 03/07/23 1824    insulin lispro (HUMALOG) injection, , SubCUTAneous, Q6H, Walker Aponte MD, 2 Units at 03/08/23 1133    0.9% sodium chloride infusion 250 mL, 250 mL, IntraVENous, CONTINUOUS, Herb Mcintyre MD, 250 mL at 03/05/23 0926    hydrALAZINE (APRESOLINE) 20 mg/mL injection 5 mg, 5 mg, IntraVENous, Q6H PRN, Herb Mcintyre MD, 5 mg at 03/09/23 8620    hydrALAZINE (APRESOLINE) tablet 5 mg, 5 mg, Oral, PRN, Herb Mcintyre MD    niCARdipine (CARDENE) 25 mg in 0.9% sodium chloride 250 mL (Ewab1Nox), 0-15 mg/hr, IntraVENous, TITRATE, Natalie Broussard DO, Stopped at 03/05/23 1925    aspirin chewable tablet 81 mg, 81 mg, Per NG tube, DAILY, Tyrell Solorzano MD, 81 mg at 03/09/23 0925    bicarbonate dialysis (PRISMASOL) BG K 4/Ca 2.5 5000 ml solution, , Extracorporeal, DIALYSIS CONTINUOUS, Alec Schultz MD, Last Rate: 1,750 mL/hr at 03/05/23 1106, New Bag at 03/05/23 1106    epoetin heidy-epbx (RETACRIT) injection 10,000 Units, 10,000 Units, SubCUTAneous, Q TUE, THU & SAT, Alec Schultz MD, 10,000 Units at 03/07/23 2303    EPINEPHrine (ADRENALIN) 10 mg in 0.9% sodium chloride 250 mL infusion, 0-10 mcg/min, IntraVENous, TITRATE, Juan Jose Johnson MD, Stopped at 03/05/23 0920    propofol (DIPRIVAN) 10 mg/mL infusion, 0-50 mcg/kg/min, IntraVENous, TITRATE, Arturo Shaw MD, Stopped at 03/02/23 1628    HYDROmorphone 30 mg/30 mL (1 mg/mL) infusion, 0.5-1 mg/hr, IntraVENous, Jocelyn Patient, Juan Jose Choi MD, Stopped at 03/08/23 0516    dextrose (D50W) injection syrg 25 g, 25 g, IntraVENous, PRN, John Ovalle MD, 9 mL at 02/25/23 1225    neomycin-polymyxin-dexamethasone (DEXACINE) 3.5 mg/g-10,000 unit/g-0.1 % ophthalmic ointment, , Both Eyes, BID, Ziggy Moe MD, Given at 03/09/23 6259    NOREPINephrine (LEVOPHED) 8 mg in 5% dextrose 250mL (32 mcg/mL) infusion, 0.5-16 mcg/min, IntraVENous, TITRATE, Sunni Johnson MD, Stopped at 03/07/23 2100    colchicine tablet 0.6 mg, 0.6 mg, Oral, DAILY, ShailaLizette, NP, 0.6 mg at 03/09/23 0925    heparin (porcine) 1,000 unit/mL injection 1,700 Units, 1,700 Units, InterCATHeter, DIALYSIS PRN, 1,700 Units at 03/07/23 2053 **AND** heparin (porcine) 1,000 unit/mL injection 1,500 Units, 1,500 Units, InterCATHeter, DIALYSIS PRN, Dublin Makos, DO, 1,500 Units at 03/07/23 2052    balsam peru-castor oiL (VENELEX) ointment, , Topical, BID, Shakira Beltran MD, Given at 03/09/23 9516    acetaminophen (TYLENOL) solution 650 mg, 650 mg, Oral, Q4H PRN, John Ovalle MD, 650 mg at 02/20/23 0401    acetaminophen (TYLENOL) suppository 650 mg, 650 mg, Rectal, Q4H PRN, John Ovalle MD, 650 mg at 02/17/23 2013    HYDROmorphone (DILAUDID) injection 0.5 mg, 0.5 mg, IntraVENous, Q3H PRN, Leandra MOLINA MD, 0.5 mg at 02/22/23 2225    HYDROmorphone (DILAUDID) injection 1 mg, 1 mg, IntraVENous, Q4H PRN, Leandra MOLINA MD, 1 mg at 03/09/23 0557    [Held by provider] heparin 25,000 units in D5W 250 ml infusion, 12-25 Units/kg/hr, IntraVENous, TITRATE, Veronica Bonilla MD, Stopped at 03/08/23 0515    albumin human 25% (BUMINATE) solution 12.5 g, 12.5 g, IntraVENous, Q6H, Veronica Bonilla MD, Last Rate: 60 mL/hr at 02/27/23 0020, 12.5 g at 03/09/23 1106    glucose chewable tablet 16 g, 4 Tablet, Oral, PRN, Shaila, Lizette B, NP    glucagon (GLUCAGEN) injection 1 mg, 1 mg, IntraMUSCular, PRN, Shaila, Lizette B, NP    sodium chloride (NS) flush 5-40 mL, 5-40 mL, IntraVENous, Q8H, Shaila, Lizette B, NP, 10 mL at 03/08/23 0522    sodium chloride (NS) flush 5-40 mL, 5-40 mL, IntraVENous, PRN, Shaila, Lizette B, NP, 40 mL at 02/17/23 1818    naloxone (NARCAN) injection 0.4 mg, 0.4 mg, IntraVENous, PRN, Shaila, Lizette B, NP    ELECTROLYTE REPLACEMENT NOTE: Nurse to review Serum Potassium and Magnesuim levels and Initiate Electrolyte Replacement Protocol as needed, 1 Each, Other, PRN, Shaila, Lizette B, NP    dextrose 10 % infusion 0-250 mL, 0-250 mL, IntraVENous, PRN, Shaila, Lizette B, NP, Last Rate: 150 mL/hr at 02/24/23 0220, 75 mL at 02/24/23 0220    acetaminophen (TYLENOL) tablet 650 mg, 650 mg, Oral, Q6H PRN, Shaila, Lizette B, NP, 650 mg at 02/17/23 0200    ondansetron (ZOFRAN) injection 4 mg, 4 mg, IntraVENous, Q4H PRN, Shaila, Lizette B, NP, 4 mg at 03/09/23 0626    albuterol-ipratropium (DUO-NEB) 2.5 MG-0.5 MG/3 ML, 3 mL, Nebulization, Q6H PRN, Shaila, Lizette B, NP    senna-docusate (PERICOLACE) 8.6-50 mg per tablet 1 Tablet, 1 Tablet, Oral, DAILY, Shaila, Lizette B, NP, 1 Tablet at 03/03/23 0856    polyethylene glycol (MIRALAX) packet 17 g, 17 g, Oral, DAILY, Shaila, Lizette B, NP, 17 g at 03/03/23 0856    bisacodyL (DULCOLAX) suppository 10 mg, 10 mg, Rectal, DAILY PRN, Shaila, Lizette B, NP, 10 mg at 02/22/23 0839    0.45% sodium chloride infusion, 10 mL/hr, IntraVENous, CONTINUOUS, Margaret Ng MD, Last Rate: 10 mL/hr at 03/05/23 0751, 10 mL/hr at 03/05/23 0751    DOBUTamine (DOBUTREX) 500 mg/250 mL (2,000 mcg/mL) infusion, 0-10 mcg/kg/min, IntraVENous, TITRATE, Kita Johnson MD, Stopped at 02/23/23 1044    dexmedeTOMidine in 0.9 % NaCl (PRECEDEX) 400 mcg/100 mL (4 mcg/mL) infusion soln, 0.1-1.5 mcg/kg/hr, IntraVENous, TITRATE, Kita Johnson MD, Stopped at 03/08/23 2300    PATIENT CARE TEAM:  Patient Care Team:  Governor Niko MD as PCP - General (Family Medicine)  Governor Niko MD as PCP - [facility]  Matilde Knight, MD (Cardiovascular Disease Physician)  Wen Jones MD (Gastroenterology)  Jenna Cope MD (Cardiothoracic Surgery)  Devon Carrion MD (Cardiovascular Disease Physician)  Tabitha Chery MD (Nephrology)  Di Vidal MD (Pulmonary Disease)  Malinda Mchugh MD ([address] Vascular Surgery)     Thank you for allowing me to participate in this patient's care. Domi Arteaga NP   [address] [place] [address], [address]  Phone: [phone]    Total Critical Care time spent:    45 minutes. There was no overlap with other services        Critical care was necessary to treat or prevent imminent or life threatening deterioration of the following conditions: cardiac failure, respiratory failure and CNS failure or compromise     Services Provided:  1. Telemetry review and 12 lead ECG interpretation  2. Hemodynamic interpretation, assessment, and management  3. Review and interpretation of CXR  4. Review and interpretation of lab values  5. Review and interpretation of microbiologic data and culture results  6. Review of medications and administration  7. Review and interpretation of nutrition requirements and management  8. Discussion of management withother consultants and services  9.  Clinical update to family members

## 2023-03-09 NOTE — PROGRESS NOTES
1600- Bedside report received. Assessment performed. Straight cathed per MD. 50cc of cloudy urine output after straight cath x2. Telephone order for UA with culture on hold. 1815- RIJ MAC site dressing changed. Gearline Kluver placed on site. 2000- Bedside and Verbal shift change report given to [address] (oncoming nurse) by Juli Robles RN (offgoing nurse). Report given with SBAR, Kardex, Intake/Output and MAR.

## 2023-03-09 NOTE — PROGRESS NOTES
Comprehensive Nutrition Assessment    Type and Reason for Visit: Reassess    Nutrition Recommendations/Plan:     Continue trickle feeds for now. As tolerated/ per MD, advanced TF back to goal of Vital 1.5 @ 40 ml/hr with 1 packet Prosource TID and flushes of 30 mL H2O q 4 hours    If concerns for feeding with pancreatitis, consider advancing DHT / replacing with longer tube to feed past ligament of treitz. Pt would not be a good candidate for TPN given liver fx. Current formula remains most appropriate for overall GI tolerance, elevated lipase, elevated t. Bili and LFTs         Malnutrition Assessment:  Malnutrition Status: Moderate malnutrition (03/09/23 1427)    Context:  Acute illness     Findings of the 6 clinical characteristics of malnutrition:   Energy Intake:  Mild decrease in energy intake (specify) (TF off/ at trickle for past 48 hours)  Weight Loss:  Unable to assess     Body Fat Loss:  Mild body fat loss, Buccal region   Muscle Mass Loss: Moderate muscle mass loss, Clavicles (pectoralis & deltoids), Temples (temporalis), Thigh (quadriceps)  Fluid Accumulation:  Mild, Genitals, Extremities, Generalized   Strength:  Not performed        Nutrition Assessment:    PMHx: CAD s/p PCI x 2, HFrEF with EF 25-30%, DM, HTN, hypercholesterolemia, Nstemi, CKD stage III. Admitted 1/26 d/t ongoing symptoms r/t his HFrEF and LVAD work-up. Tx to CVICU on 2/3 for [facility] placement and ongoing LVAD workup. Noted, as part of LVAD work-up PTA, pt underwent EGD with bx on 1/18/23 which path revealed well-differentiated neuroendocrine tumor. Oncology consulted, no absolute contraindications to LVAD. Nephrology following for TANNER on CKD 3. LVAD placed 2/15. Pt extubated 2/17 but then had to be re-intubated 2/18 for placement of RVAD 2/18. Had plans to start CVVHD 2/17 however pt too unstable ON; started post RVAD (Impella RP support device). CRRT started 2/18. Tube feeding initiated 2/19.  Liver failure with elevated T-bili (hemolysis and/or ischemic vs congestive hepatopathy). Impella removed 3/1. Code neuro on 3/1 and 3/2, SAH noted on CT. Unable to obtain MRI d/t LVAD. AC stopped. Extubated to BiPAP on 3/2, but re-intubated same day. Elevated lipase 3/5 - ?pancreatitis, possibly 2/2 hypercalcemia. Hypercalcemia - s/p Calcitonin, Sensipar being increased. CRRT stopped 3/5 - transitioned to iHD on 3/6. Trach placed 3/7. SLP following for in-line PMV trial when appropriate. 3/9: TF held 3/8 for N/V yesterday, resumed today (3/9) @ trickle - 20 mL/hr. Per RN, pt was tolerating at goal up until trach and yesterday pt was vomiting blood that was from trach site. Has done well with trickle feeds so far today and giving Zofran prophylactically - pt did tolerate a packet of Prosource today. T bili, LFTs, lipase all trending up - plans to monitor. +jaundice. RN states this is the first she is reading about the pancreatitis - it hasn't been brought up as a major concern in regards to nutrition. Respiratory rate too high for in-line PMV. HD today - received 3/6, 3/7, none 3/8. Off pressors, antimicrobials, sedation. Insulin adjustments per Diabetes with reduced TF. Pt would not be a good candidate for TPN given worsening liver fx. Hopefully will be able to tolerate increases in tube feeds soon. Weight up d/t fluid. Updated malnutrition assessment - continues to meet moderate PCM criteria, but worsening. Difficult to assess fat loss and wt loss with edema present, so could very well meet severe PCM criteria. Will continue to monitor. TF at goal of Vital 1.5 @ 40 mL/hr with 1 packet Prosource TID and flushes of 30 mL h2o q 4 hours provides 880 mL, 1500 kcal, 104 gm pro, 165 gm CHO, [phone]=0247 mL free H2o. This meets 100% kcal needs and 95% pro needs respectively. 3/6: follow-up. Events noted above. Failed extubation and now discussions re: trach. Off pressors and propofol. Precedex for sedation. Elevated lipase 3/5, ? pancreatitis. Already on more appropriate formula with higher MCT ratio. Continues on TF at goal without overt signs of intolerance. DHT replaced on 3/1 - imaging 3/5 reveals tip in duodenal bulb. Vital 1.5 @ 40 mL/hr with 1 packet Prosource TID and flushes of 30 mL H2o q 4 hours providing 880 mL, 1500 kcal, 104 gm pro, 165 gm CHO, and [phone]=9016 mL free H2o. Weight trending up, currently 3 L net positive. 1-2+ pitting edema. BG lower normal - insulin reduced. 3/1: pt remains intubated, on CRRT. Impella removed today. Propofol was resumed on 2/28, but only running @ 5.9 mL/hr which provides 156 kcal.  TF via NG of Vital 1.5 @ 40 mL/hr with 1 packet Prosource TID and flushes of 30 mL h2o q 4 hours providing 880 mL, 1500 kcal, 104 gm pro, 165 gm CHO, 669+270+521=3547 mL free H2o. With propofol = 1656 kcal which meets 104% kcal needs. Off levo, remains on epi. Insulin gtt off - now on NPH BID. BG within target range. Low K+ and Phos, repletion ordered. Having soft BMs. Given malnutrition and overall more stable/ less pressors, current TF regimen remains appropriate. 2/27: follow-up. Remains vented on CRRT. Tolerating TF at goal.  Having looses BM 2-3x/day, but nothing excessive per RN. Propofol was at 3.9 mL/hr (103 kcal), but currently off and doing well. Vital 1.5 @ 35 ml/hr with 3 packets Prosource daily; minimal water flush of 30 ml q 4 hr providing 770 ml, 1320 calories, 96 gm protein, 42 gm fat, 43 mEq potassium and 940 ml free water. K+ WNL, Mg high, Phos low - improved since yesterday. BG well controlled, remains on insulin drip, but plans to adjust to basal insulin soon. Given pt is more stable and with malnutrition, recommend slightly increasing TF to better meet est needs without propofol running. Even if resumed at previous rate, would still be within kcal range/ not significant overfeeding.   Will slightly increase TF to Vital 1.5 @ 40 mL/hr with 1 packet Prosource TID and flushes of 30 mL h2o q 4 hours provides 880 mL, 1500 kcal, 104 gm pro, 165 gm CHO, 669+270+555=3581 mL free H2o. This meets 100% kcal needs and 95% pro needs respectively. 2/20: Pt extubated 2/17 but then had to be re-intubated 2/18 for placement of RVAD 2/18. Plan to start CVVHD 2/17 however pt too unstable ON; started post RVAD (Impella RP support device). Tube feeding ordered yesterday; RD consult placed. Liver failure with elevated T-bili (hemolysis and/or ischemic vs congestive hepatopathy). Mr Sisto Olszewski has tolerated EN well thus far. Last BM however 2/14-bowel regimen is ordered. Recommend increasing Pericolace to bid since pt requiring a fair amount of Fentanyl. Will change formula to Vital 1.5 to avoid fiber and fat content (structured lipid 2/2 elevated bilirubin). New goal: Vital 1.5 @ 35 ml/hr with 3 packets Prosource daily; continue minimal water flush of 30 ml q 4 hr. This will provide 770 ml, 1320 calories, 96 gm protein, 42 gm fat, 43 mEq potassium and 940 ml free water (tube feeding/flush) per day to meet 88% protein needs. Tube feeding and propofol will meet 100% estimated energy needs (1695 calories per day). 2/17: pt had LVAD placed 2/15 and remains intubated. Spoke with RN yesterday and again today - no plans to start TF, working on extubation now. RN states OG is too small for tube feeds anyway and worries it would clog, PO meds held. Pt would need a DHT. Even if pt extubated, will be a slow progression of diet over weekend. DHT may need to be considered, but also with Carafate QID, tube feeds would need to be held at various points throughout the day which makes regimen more complicated, so would consider intermittent vs nocturnal feeds if needed. PO intake was improving pre-op, but overall still with significant wt loss and meeting moderate malnutrition status prior to surgery.   Weight up as expected post-op (d/t fluid). Currently 150 lb, but was 123 lb on 2/13 standing scale when last seen by this service. Ve Observed 8.68 l/min, tMax:102.4 °F  [place] EEN ~1800 kcal, which is similar to current EEN. Therefore, will not adjust given likelihood of extubation soon. If TF needed, recommend 4 cartons of Nepro - giving 1 carton over 1 hour QID that are timed around the Carafate. 4 cartons of Nepro daily with 60 mL H2O flushes 4x/day provide 948 mL, 1706 kcal, 77 gm pro, 153 gm CHO, and 692+997=461 mL free H2O. Nutritionally Significant Medications:  Buminate, colchicine, epo, heparin gtt, SSI, protonix, NPH 5 units BID, Sensipar, pericolace (held), miralax (held)  PRN: dilaudid, zofran      Estimated Daily Nutrient Needs:  Energy Requirements Based On: Formula  Weight Used for Energy Requirements: Admission (54.4 kg)  Energy (kcal/day): 3944-8464 (25-30 kcal/kg; ~1500 kcal/day using [id number])  Weight Used for Protein Requirements: Admission  Protein (g/day): 110 (2 gm/kg)  Method Used for Fluid Requirements: Standard renal  Fluid (ml/day): 500 mL + OP    Nutrition Related Findings:   Edema: Genital: 1+ (3/8/2023  8:00 PM)  Facial: Scleral (3/8/2023  8:00 PM)  Generalized: 1+ (3/9/2023 11:30 AM)  LLE: 2+; Pitting (3/9/2023  8:00 AM)  LUE: 1+; Pitting (3/9/2023  8:00 AM)  RLE: 2+; Pitting (3/9/2023  8:00 AM)  RUE: 1+; Pitting (3/9/2023  8:00 AM)    Last BM: 03/09/23, Formed    Wounds: Multiple, Deep tissue injury, Unstageable, Surgical incision      Current Nutrition Therapies:  Diet: NPO  Nutrition Support: TF via DHT of Vital 1.5 @ 20 mL/hr with 30 mL h2o flushes q 4 hours. Goal is 40 mL/hr with 3 packets Prosource daily. Anthropometric Measures:  Height: 5' 6\" (167.6 cm)  Ideal Body Weight (IBW): 142 lbs (65 kg)  Admission Body Weight: 120 lb  Current Body Wt:  70.7 kg (155 lb 13.8 oz), 109.8 % IBW.  Bed scale  Current BMI (kg/m2): 25.2        Weight Adjustment: No adjustment                 BMI Category: Underweight (BMI less than 22) age over 72    Last 3 Recorded Weights in this Encounter    03/07/23 0537 03/08/23 0200 03/09/23 0323   Weight: 66.1 kg (145 lb 11.6 oz) 67.6 kg (149 lb 0.5 oz) 70.7 kg (155 lb 13.8 oz)     Last 3 Recorded Weights in this Encounter    03/04/23 0700 03/05/23 0700 03/06/23 5106   Weight: 62.3 kg (137 lb 5.6 oz) 62.9 kg (138 lb 10.7 oz) 64.5 kg (142 lb 3.2 oz)     Last 3 Recorded Weights in this Encounter    02/25/23 0619 02/26/23 0431 02/27/23 0430   Weight: 67.7 kg (149 lb 4 oz) 65.4 kg (144 lb 2.9 oz) 62.2 kg (137 lb 2 oz)     Wt Readings from Last 10 Encounters:   02/27/23 62.2 kg (137 lb 2 oz)   01/25/23 55.8 kg (123 lb)   01/23/23 54.9 kg (121 lb)   01/21/23 54.4 kg (120 lb)   01/20/23 52.2 kg (115 lb)   01/20/23 52.2 kg (115 lb)   01/19/23 53 kg (116 lb 13.5 oz)   12/19/22 58.5 kg (129 lb)   12/16/22 57.4 kg (126 lb 8.7 oz)   12/05/22 59 kg (130 lb)         Nutrition Diagnosis:   Inadequate oral intake related to impaired respiratory function, cardiac dysfunction as evidenced by intubation, nutrition support-enteral nutrition  Underweight related to inadequate protein-energy intake as evidenced by BMI  Inadequate enteral nutrition infusion related to altered GI function as evidenced by nausea, vomiting (TF off earlier this week, now trickle feeds)      Nutrition Interventions:   Food and/or Nutrient Delivery: Continue tube feeding  Nutrition Education/Counseling: No recommendations at this time  Coordination of Nutrition Care: Continue to monitor while inpatient (RN)       Goals:  Previous Goal Met: Progress towards goal(s) declining  Goals: other (specify) (to meet >90% of EEN over next 4-7 days)  Specify Other Goals: tolerate TF at goal rate to meet >90% of EEN  within 4-5 days    Nutrition Monitoring and Evaluation:   Behavioral-Environmental Outcomes: None identified  Food/Nutrient Intake Outcomes: Enteral nutrition intake/tolerance  Physical Signs/Symptoms Outcomes: Biochemical data, GI status, Fluid status or edema, Hemodynamic status, Nutrition focused physical findings, Weight, Skin    Discharge Planning:     Too soon to determine    Recent Labs     03/09/23  0504 03/08/23  1527 03/08/23  0411 03/07/23  0342   *  --  223* 165*   BUN 56*  --  30* 30*   CREA 3.53*  --  2.33* 2.32*     --  137 136   K 5.0  --  4.3 4.5     --  104 105   CO2 19*  --  23 21   CA 11.9*  --  10.6* 10.9*   PHOS 7.2* 6.0* 4.0 4.4   MG 3.0*  --  2.6* 3.4*       Recent Labs     03/09/23  0504 03/08/23  0411 03/07/23 0342   * 92* 93*   * 178* 185*   * 181* 164*   TBILI 14.3* 12.2* 11.4*   TP 7.0 7.2 6.4   ALB 4.0 4.1 3.6   GLOB 3.0 3.1 2.8   LPSE  --  1,544*  --        Recent Labs     03/09/23  0758 03/08/23  1527 03/08/23 0414   WBC 11.0  --  7.6   HGB 7.9* 8.0* 7.9*   HCT 25.7* 25.5* 25.6*     --  254       Recent Labs     03/07/23 0342   PREALB 13.1*     Prealbumin   Date Value Ref Range Status   03/07/2023 13.1 (L) 20.0 - 40.0 mg/dL Final   03/05/2023 13.5 (L) 20.0 - 40.0 mg/dL Final   02/28/2023 11.6 (L) 20.0 - 40.0 mg/dL Final     Recent Labs     03/09/23  1111 03/09/23  0608 03/09/23  0009 03/08/23  1713 03/08/23  1130 03/08/23  0537 03/08/23  0035 03/07/23  2103 03/07/23  1725 03/07/23  1153 03/07/23  0450 03/07/23  0106 03/06/23 2043 03/06/23  1828   GLUCPOC 176* 130* 152* 199* 247* 179* 181* 132* 164* 147* 167* 184* 152* 135*       Lab Results   Component Value Date/Time    Hemoglobin A1c 7.7 (H) 01/11/2023 06:14 PM    Hemoglobin A1c 6.5 (H) 12/06/2022 03:53 AM    Hemoglobin A1c 7.0 (H) 10/12/2022 09:10 AM         [address] RD   Available via [employer]

## 2023-03-09 NOTE — TELEPHONE ENCOUNTER
[facility] called needing to make a hospital follow up for this patient. Admitted on 3/3 and discharged 3/9 for congested heart failure.     Best call back number for patient  [phone]

## 2023-03-09 NOTE — TELEPHONE ENCOUNTER
I do not see patient has been discharged. He still in the hospital.  He has some life support lines as per records. I am not sure patient will be discharged home or rehab. He was actually admitted on 26th January. To be on safer side, will make virtual visit on Tuesday or Wednesday 3:40 PM and please block 40 minutes.   Thanks

## 2023-03-09 NOTE — PROGRESS NOTES
Attempted to schedule hospital follow up PCP appointment. Office /Nurse will follow up with PCP to Secure appointment and contact the patient with appointment information. Pending patient discharge.  Stacy Garcia, Care Management Assistant

## 2023-03-09 NOTE — PROGRESS NOTES
ID:    Patient has been afebrile. Will recommend to stop the zosyn and monitor clinically. ID will sign off now, please recall as needed.            Megan Hidalgo MD  Infectious Diseases

## 2023-03-09 NOTE — PROGRESS NOTES
0800 Bedside shift change report given to [address] (oncoming nurse) by Kamran Bolton RN (offgoing nurse). Report included the following information SBAR, Kardex, ED Summary, OR Summary, Procedure Summary, Intake/Output, MAR, Accordion, Recent Results, and Cardiac Rhythm NSR . Assessment completed and LVAD settings noted. 0930 HF NP Lizette at bedside- updated on patient condition. 1000 Hemodialysis scheduled on TUE, THU, SAT- patient to receive HD today. 1115 Dialysis RN at bedside- 1.5L removal ordered per Nephro. 1205 Paired Blood Cultures obtained and sent to lab. 1235 Sputum culture obtained via tracheal suction and sent to lab.    1500 Dialysis session finished. LVAD dressing changed. 1535 Bedside shift change report given to [address] (oncoming nurse) by Cecilio Mane RN (offgoing nurse). Report included the following information SBAR, Kardex, ED Summary, OR Summary, Procedure Summary, Intake/Output, MAR, Accordion, Recent Results, and Cardiac Rhythm NSR/SR .

## 2023-03-09 NOTE — PROGRESS NOTES
CRITICAL CARE NOTE      Name: Alivia Castaneda   : 1950   MRN: [id number]   Date: 3/9/2023      Reason for ICU Admission: Acute on chronic HFrEF     ICU PROBLEM LIST   Acute on chronic HFrEF (20%) NYHA IIIb/IV (D) on home inotropic support, life vest s/p LVAD  TANNER on CKD3, on CRRT  CHB post op, resolved  Aflutter w/ RVR  Acute on chronic hypoxemic respiratory failure, s/p trach placement  CAP  CAD  Gastric neuroendocrine tumor  Hx of LUE DVT  DM  PAD  TRISTAN  BPH w/ urinary retention requiring chronic donnelly    HISTORY OF PRESENT ILLNESS:   Pt is a 71 y.o M w/ PMH as noted above admitted to [facility] on  due to ongoing symptoms secondary to his HFrEF. Treated for possible CAP, and diuresed for acute on chronic HFrEF. Nephrology following for TANNER on CKD 3. AHF team recommended transfer to CVICU for [facility] placement and ongoing LVAD workup, with placement 2/15. Pt extubated , however with development of worsening renal dysfunction overnight and unable to tolerate CRRT so was reintubated and taken for Impella RP placement for right-sided support in a.m. of  and CRRT resumed. Impella removed on 3/1. Overnight 3/1-3/1 CVA noted with SAH.  3/2 SAH enlarged. Held anticoagulation. Failed extubation 3/2. Trach placed 3/7    24 HOUR EVENTS:   No acute overnight events, oozing from trach site improved with packing. Lactic downtrended. Hemodynamic parameters normal, more awake, mouthing words. Unable to tolerate PSV due to low tidal volumes and tachypnea this a.m. Plan for HD today with more aggressive fluid removal.    NEUROLOGICAL:    PRN anxiolytics, pain regimen  Avoid sedation now post trach  CT with resolving SAH, will repeat imaging after heparin therapeutic   Delirium precautions    PULMONOLOGY:   Lung protective ventilation, on minimal vent support at this time  S/p Trach placement   Continue with PSV trials  SLP consulted for in-line PMV trial, however remains tachypneic this a.m.   Monitor blood gas  Monitor bleeding at trach site  PT OT, sit up in bed with goal out of bed to chair as tolerated    CARDIOVASCULAR:   MAP goal >65 <85, PRN vasopressors, antihypertensives  If BP remains elevated will start PO BP regimen  LCT down trended  Hemodynamic monitoring  LVAD  3L @ 4600 RPM  CVP goal 10-12  C/w ASA, statin  Goal euvolemia  Unable to tolerate BB, ARNI/ARB due to hypotension, aldactone held 2/2 elevated K     GASTROINTESTINAL:   NPO, on tube feeds  PPI   Trend LFTs  Tbili up this a.m., monitor  ? Pancreatitis, possibly 2/2 hyper Ca, c/w sensipar    RENAL/ELECTROLYTE/FLUIDS:   Strict I/Os,  goal  euvolemia  HD per nephrology   Monitor for renal recovery  Monitor RFP  Mg/Phos/K >2/3/4  Vasquez to remain in place    ENDOCRINE:    SSI, Basal insulin   Hypoglycemic protocol  Glucose goal 120-180    HEMATOLOGY/ONCOLOGY:   Continue with heparin  Hemoglobin goal greater than 7, platelet goal greater than 50  EPO weekly  Gastric neuroendocrine tumor, well differentiated, good prognosis per onc.      ID/MICRO:   Off antimicrobials  Bcx without growth at this time  Urine with Candida s/p fluconazole    ICU DAILY CHECKLIST     Code Status:Full  DVT Prophylaxis:heparin  T/L/D: trach, PAC,  PIV,  Vasquez,  V  wire, LVAD drive line  SUP: PPI  Diet: NPO, TF  Activity Level:ad jose f  ABCDEF Bundle/Checklist Completed:Yes  Disposition: Stay in ICU  Multidisciplinary Rounds Completed:  yes  Patient/Family Updated: Yes    [facility]   2/3 Transferred to CVICU for [facility] placement  2/7 started on dobutamine as adjunct to milrinone  2/15 LVAD implant  2/16 extubated  2/18 Impella RP placement  3/1 Impella removed  3/1-2 SAH on CT  3/3 RE-intubated  3/6 Blakes removed, transitioned to HD  3/7 Trach placment    SUBJECTIVE:     As noted above    Review of Systems:     Unable to perform due to patient intubated    OBJECTIVE:     Labs and Data: Reviewed 03/09/23  Medications: Reviewed 03/09/23  Imaging: Reviewed 23    General - sedated, intubated chronically ill appearing  HEENT- pupils equal, nystagmus, anicteric  Neuro - sedated, no focal deficit  CV - Paced,  VAD hum, post sternotomy  Resp - trach, coarse b/l  Abd - soft, distended, nontender, no guarding  MSK- intact, no sacral ulcer, anasarca  LVAD driveline in left chest    Visit Vitals  /73 (BP 1 Location: Left lower arm, BP Patient Position: At rest) Comment: treatment ended. Blood returned   Pulse (!) 101   Temp 99.5 °F (37.5 °C)   Resp 26   Ht 5' 6\" (1.676 m)   Wt 70.7 kg (155 lb 13.8 oz)   SpO2 100%   BMI 25.16 kg/m²    O2 Flow Rate (L/min): 20 l/min O2 Device: Tracheostomy, Ventilator Temp (24hrs), Av.4 °F (37.4 °C), Min:98.4 °F (36.9 °C), Max:100.5 °F (38.1 °C)    CVP (mmHg): 11 mmHg (23 1000)      Intake/Output:     Intake/Output Summary (Last 24 hours) at 3/9/2023 1057  Last data filed at 3/9/2023 1000  Gross per 24 hour   Intake 800.49 ml   Output 2 ml   Net 798.49 ml       Imaging    23    ECHO ADULT FOLLOW-UP OR LIMITED 2023    Interpretation Summary    Left Ventricle: EF by visual approximation is 20%. Left ventricle is mildly dilated. Mitral Valve: Moderately thickened leaflet, at the anterior and posterior leaflets. Moderately calcified leaflet. Moderate regurgitation. Left Atrium: Left atrium is moderately dilated. Technical qualifiers: Echo study was technically difficult with poor endocardial visualization. Contrast used: Definity. Limited study, not all structures viewed    Signed by: Shelley Huffman MD on 2023  4:39 PM       Pertinent imaging reviewed and interpreted as noted above    CRITICAL CARE DOCUMENTATION  I had a face to face encounter with the patient, reviewed and interpreted patient data including clinical events, labs, images, vital signs, I/O's, and examined patient.   I have discussed the case and the plan and management of the patient's care with the consulting services, the bedside nurses and the respiratory therapist.      NOTE OF PERSONAL INVOLVEMENT IN CARE   This patient has a high probability of imminent, clinically significant deterioration, which requires the highest level of preparedness to intervene urgently. I participated in the decision-making and personally managed or directed the management of the following life and organ supporting interventions that required my frequent assessment to treat or prevent imminent deterioration. I personally spent 35 minutes of critical care time. This is time spent at this critically ill patient's bedside actively involved in patient care as well as the coordination of care. This does not include any procedural time which has been billed separately. Walker Reynolds MD  Staff [address] [facility]

## 2023-03-09 NOTE — PROGRESS NOTES
Bedside and Verbal shift change report given to [address] (oncoming nurse) by Harshal Lopez RN (offgoing nurse). Report included the following information SBAR, OR Summary, Procedure Summary, Intake/Output, MAR, and Cardiac Rhythm nsr . 2030 LVAD low flow alarm, IVP hydalazine given   TF on hold r/t bloody emesis from today, holding evening NPH    2300 Precedex gtt off    0300 MAP persistently >85, LVAD flow 2.6-2.8, PRN labetalol given    0400 Pt placed on SPONT w/ pressure support 10, RR ~30 w/ tidal volumes 250-300  CHG bath, gown change, linen change, dressing and lines changed    0500 Pt now w/ RR >40, decreasing tidal volumes, Placed back on assist control    0640 intensivist rounding  restarted TF at trickle       Bedside and Verbal shift change report given to [address] (oncoming nurse) by Lucita Mann RN (offgoing nurse). Report included the following information SBAR, OR Summary, Intake/Output, and Cardiac Rhythm nsr .

## 2023-03-09 NOTE — PROGRESS NOTES
SLP Contact Note    Pt's respiratory rate too high at this time for in-line PMV. Will continue to follow for readiness.       Thank you,  YANDEL WinchesterEd, [id number] [facility]  Speech-Language Pathologist

## 2023-03-09 NOTE — PROCEDURES
Hemodialysis / [phone]    Vitals Pre Post Assessment Pre Post   BP BP: (!) 106/55 (all blood pressure readings from arterial line) (03/09/23 1137)   101/51 LOC Eyes open spontaneously Eyes open spontaneously   HR Pulse (Heart Rate): (!) 101 (03/09/23 1137)   90 Lungs Trach collar/vent Trach collar/vent   Resp Resp Rate: 24 (03/09/23 1137) 26 Cardiac LVAD regular  LVAD regular    Temp Temp: 99.1 °F (37.3 °C) (03/09/23 1137) 98 Skin Warm and dry Warm and dry    Weight  N/a N/a Edema Generalized  generalized   Tele status bedside bedside Pain Pain Intensity 1: 0 (03/09/23 0800) 0     Orders   Duration: Start: 0402 End: 9081 Total: 3   Dialyzer: Dialyzer/Set Up Inspection: Revaclear (03/09/23 1137)   K Bath: Dialysate K (mEq/L): 2 (03/09/23 1137)   Ca Bath: Dialysate CA (mEq/L): 2.0 (03/09/23 1137)   Na: Dialysate NA (mEq/L): 140 (03/09/23 1137)   Bicarb: Dialysate HCO3 (mEq/L): 30 (03/09/23 1137)   Target Fluid Removal: Goal/Amount of Fluid to Remove (mL): 1500 mL (03/09/23 1137)     Access   Type & Location: [facility] non tunneled    Comments:                               dressing clean, dry and intact dated 03/08/23 no redness or drainage         Labs   HBsAg (Antigen) / date:   02/18/23 negative                                            HBsAb (Antibody) / date: 02/18/23 immune   Source: epic   Obtained/Reviewed  Critical Results Called HGB   Date Value Ref Range Status   03/09/2023 7.9 (L) 12.1 - 17.0 g/dL Final     Potassium   Date Value Ref Range Status   03/09/2023 5.0 3.5 - 5.1 mmol/L Final     Calcium   Date Value Ref Range Status   03/09/2023 11.9 (H) 8.5 - 10.1 MG/DL Final     BUN   Date Value Ref Range Status   03/09/2023 56 (H) 6 - 20 MG/DL Final     Comment:     INVESTIGATED PER DELTA CHECK PROTOCOL     Creatinine   Date Value Ref Range Status   03/09/2023 3.53 (H) 0.70 - 1.30 MG/DL Final     Comment:     INVESTIGATED PER DELTA CHECK PROTOCOL        Meds Given   Name Dose Route   Albumin 25% 25 grams 1238 iv drip for map <65               Adequacy / Fluid    Total Liters Process: 66   Net Fluid Removed: 1500      Comments   Time Out Done:   (Time) 1130   Admitting Diagnosis: Heart failure    Consent obtained/signed: Informed Consent Verified: Yes (03/09/23 1137)   Machine / RO # Machine Number: [id number] (03/09/23 1137)   Primary Nurse Rpt Pre: Daisy Rubin RN    Primary Nurse Rpt Post: Daisy Rubin RN   Pt Education: With wife at bedside procedural   Care Plan: Continue current HD plan of care   Pts outpatient clinic: N/a     Tx Summary   Comments:            orders, labs, and code status reviewed. 1137 HD initiated as ordered. 1238 albumin initiated for MAP <65.  1437 treatment completed all possible blood returned, Each dialysis catheter limb disinfected per p&p, blood returned per p&p, each dialysis hub disinfected per p&p, post dialysis catheter dwell instilled per order, and caps applied.

## 2023-03-09 NOTE — DIABETES MGMT
Tracheostomy placement with bronchoscopy 3/7  Some bloody emesis yesterday, enteral feeds stopped yesterday afternoon and now at trickle- 20ml/hr  Central line replaced  Lactate up yesterday but improved now   Sedation off  24h BG pattern: 130-199  NPH has been held since last night. Will reduce NPH to 5 units Q12 as feeds were cut in half.   Has required NPH 10 units Q12 when feeds were at 40ml/hr

## 2023-03-09 NOTE — PROGRESS NOTES
Name: Jem Dillard   MRN: [id number]  : 1950      Assessment:  TANNER on CKD-3a: Suspect cardiorenal effects with needed diuresis. Now has LVAD and  POST OP ATN. Anuric->Requiring CRRT>>now iHD  hypercalcemia/immobilization- also has elevated PTH; may have component of 1ry vs secondary HPT  Possible pancreatitis- with elevated lipase; Hypercalcemia can contribute  Ischemic CM: Recurrent exacerbations. EF 20%. S/p LVAD on 2/15. Required Impella RP for RV support-> attempts to wean not tolerated by patient. CVA  Sepsis  BPH   Well differentiated NET Grade 1: noted on EGD 23  Anemia 2 to CKD:  s/p PRBCS    Left UE basilic and radial vein thrombus  Thrombocytopenia     Was on CVVH. Did iHD on 3/6 and 3/7 again. Ca better but bumped higher today     Plan/Recommendations:  HD today-low calcium bath, increase UF attempt for 1-1.5 Kg as leatha  Ct Sensipar- increase dose to 60 mg every day  S/p Calcitonin x 4 doses for high Ca  Will hold off on IV Zometa or Pamidronate as last resort  BRIGHT      Subjective:  Remains on vent via trach. Not responsive    ROS:   UTO    Exam:  Visit Vitals  /73 (BP 1 Location: Left lower arm, BP Patient Position: At rest) Comment: treatment ended.  Blood returned   Pulse (!) 101   Temp 99.5 °F (37.5 °C)   Resp 22   Ht 5' 6\" (1.676 m)   Wt 70.7 kg (155 lb 13.8 oz)   SpO2 100%   BMI 25.16 kg/m²     NAD  +icterus  +trach  +tr edema  + Jaundice    Current Facility-Administered Medications   Medication Dose Route Frequency Last Admin    oxyCODONE IR (ROXICODONE) tablet 5 mg  5 mg Oral Q4H PRN      oxyCODONE IR (ROXICODONE) tablet 10 mg  10 mg Oral Q4H PRN      pantoprazole (PROTONIX) 40 mg in 0.9% sodium chloride 10 mL injection  40 mg IntraVENous DAILY 40 mg at 23 0926    labetaloL (NORMODYNE;TRANDATE) injection 5 mg  5 mg IntraVENous Q4H PRN 5 mg at 03/09/23 0307    heparin 25,000 units in D5W 250 ml infusion  400 Units/hr IntraVENous CONTINUOUS 400 Units/hr at 03/09/23 0757    levETIRAcetam (KEPPRA) injection 250 mg  250 mg IntraVENous Once per day on Tue Thu Sat      0.9% sodium chloride infusion  14 mL/hr IntraVENous CONTINUOUS 14 mL/hr at 03/09/23 0757    levETIRAcetam (KEPPRA) injection 500 mg  500 mg IntraVENous DAILY 500 mg at 03/09/23 0927    insulin NPH (NOVOLIN N, HUMULIN N) injection 10 Units  10 Units SubCUTAneous Q12H 10 Units at 03/08/23 0831    cinacalcet (SENSIPAR) tablet 30 mg  30 mg Oral DAILY WITH BREAKFAST 30 mg at 03/09/23 0925    albumin human 25% (BUMINATE) solution 25 g  25 g IntraVENous DIALYSIS PRN 25 g at 03/07/23 1824    insulin lispro (HUMALOG) injection   SubCUTAneous Q6H 2 Units at 03/08/23 1133    0.9% sodium chloride infusion 250 mL  250 mL IntraVENous CONTINUOUS 250 mL at 03/05/23 0926    hydrALAZINE (APRESOLINE) 20 mg/mL injection 5 mg  5 mg IntraVENous Q6H PRN 5 mg at 03/09/23 0711    hydrALAZINE (APRESOLINE) tablet 5 mg  5 mg Oral PRN      niCARdipine (CARDENE) 25 mg in 0.9% sodium chloride 250 mL ([id number])  0-15 mg/hr IntraVENous TITRATE Stopped at 03/05/23 1925    aspirin chewable tablet 81 mg  81 mg Per NG tube DAILY 81 mg at 03/09/23 0925    bicarbonate dialysis (PRISMASOL) BG K 4/Ca 2.5 5000 ml solution   Extracorporeal DIALYSIS CONTINUOUS New Bag at 03/05/23 1106    epoetin heidy-epbx (RETACRIT) injection 10,000 Units  10,000 Units SubCUTAneous Q TUE, THU & SAT 10,000 Units at 03/07/23 2303    EPINEPHrine (ADRENALIN) 10 mg in 0.9% sodium chloride 250 mL infusion  0-10 mcg/min IntraVENous TITRATE Stopped at 03/05/23 0920    propofol (DIPRIVAN) 10 mg/mL infusion  0-50 mcg/kg/min IntraVENous TITRATE Stopped at 03/02/23 1628    HYDROmorphone 30 mg/30 mL (1 mg/mL) infusion  0.5-1 mg/hr IntraVENous CONTINUOUS Stopped at 03/08/23 0516    dextrose (D50W) injection syrg 25 g  25 g IntraVENous PRN 9 mL at 02/25/23 1225    neomycin-polymyxin-dexamethasone (DEXACINE) 3.5 mg/g-10,000 unit/g-0.1 % ophthalmic ointment   Both Eyes BID Given at 03/09/23 0927    NOREPINephrine (LEVOPHED) 8 mg in 5% dextrose 250mL (32 mcg/mL) infusion  0.5-16 mcg/min IntraVENous TITRATE Stopped at 03/07/23 2100    colchicine tablet 0.6 mg  0.6 mg Oral DAILY 0.6 mg at 03/09/23 0925    heparin (porcine) 1,000 unit/mL injection 1,700 Units  1,700 Units InterCATHeter DIALYSIS PRN 1,700 Units at 03/07/23 2053    And    heparin (porcine) 1,000 unit/mL injection 1,500 Units  1,500 Units InterCATHeter DIALYSIS PRN 1,500 Units at 03/07/23 2052    balsam peru-castor oiL (VENELEX) ointment   Topical BID Given at 03/09/23 2670    acetaminophen (TYLENOL) solution 650 mg  650 mg Oral Q4H  mg at 02/20/23 0401    acetaminophen (TYLENOL) suppository 650 mg  650 mg Rectal Q4H  mg at 02/17/23 2013    HYDROmorphone (DILAUDID) injection 0.5 mg  0.5 mg IntraVENous Q3H PRN 0.5 mg at 02/22/23 2225    HYDROmorphone (DILAUDID) injection 1 mg  1 mg IntraVENous Q4H PRN 1 mg at 03/09/23 0557    [Held by provider] heparin 25,000 units in D5W 250 ml infusion  12-25 Units/kg/hr IntraVENous TITRATE Stopped at 03/08/23 0515    albumin human 25% (BUMINATE) solution 12.5 g  12.5 g IntraVENous Q6H Held at 03/09/23 0011    glucose chewable tablet 16 g  4 Tablet Oral PRN      glucagon (GLUCAGEN) injection 1 mg  1 mg IntraMUSCular PRN      sodium chloride (NS) flush 5-40 mL  5-40 mL IntraVENous Q8H 10 mL at 03/08/23 0522    sodium chloride (NS) flush 5-40 mL  5-40 mL IntraVENous PRN 40 mL at 02/17/23 1818    naloxone (NARCAN) injection 0.4 mg  0.4 mg IntraVENous PRN      ELECTROLYTE REPLACEMENT NOTE: Nurse to review Serum Potassium and Magnesuim levels and Initiate Electrolyte Replacement Protocol as needed  1 Each Other PRN      dextrose 10 % infusion 0-250 mL  0-250 mL IntraVENous PRN 75 mL at 02/24/23 0220    acetaminophen (TYLENOL) tablet 650 mg  650 mg Oral Q6H  mg at 02/17/23 0200    ondansetron (ZOFRAN) injection 4 mg  4 mg IntraVENous Q4H PRN 4 mg at 03/09/23 0626    albuterol-ipratropium (DUO-NEB) 2.5 MG-0.5 MG/3 ML  3 mL Nebulization Q6H PRN      senna-docusate (PERICOLACE) 8.6-50 mg per tablet 1 Tablet  1 Tablet Oral DAILY 1 Tablet at 03/03/23 0856    polyethylene glycol (MIRALAX) packet 17 g  17 g Oral DAILY 17 g at 03/03/23 0856    bisacodyL (DULCOLAX) suppository 10 mg  10 mg Rectal DAILY PRN 10 mg at 02/22/23 0839    0.45% sodium chloride infusion  10 mL/hr IntraVENous CONTINUOUS 10 mL/hr at 03/05/23 0751    DOBUTamine (DOBUTREX) 500 mg/250 mL (2,000 mcg/mL) infusion  0-10 mcg/kg/min IntraVENous TITRATE Stopped at 02/23/23 1044    dexmedeTOMidine in 0.9 % NaCl (PRECEDEX) 400 mcg/100 mL (4 mcg/mL) infusion soln  0.1-1.5 mcg/kg/hr IntraVENous TITRATE Stopped at 03/08/23 2300       Labs/Data:    Lab Results   Component Value Date/Time    WBC 11.0 03/09/2023 07:58 AM    HGB 7.9 (L) 03/09/2023 07:58 AM    HCT 25.7 (L) 03/09/2023 07:58 AM    PLATELET 115 33/26/6957 07:58 AM    .6 (H) 03/09/2023 07:58 AM       Lab Results   Component Value Date/Time    Sodium 137 03/09/2023 05:04 AM    Potassium 5.0 03/09/2023 05:04 AM    Chloride 104 03/09/2023 05:04 AM    CO2 19 (L) 03/09/2023 05:04 AM    Anion gap 14 03/09/2023 05:04 AM    Glucose 126 (H) 03/09/2023 05:04 AM    BUN 56 (H) 03/09/2023 05:04 AM    Creatinine 3.53 (H) 03/09/2023 05:04 AM    BUN/Creatinine ratio 16 03/09/2023 05:04 AM    GFR est AA 46 (L) 06/23/2022 09:40 AM    GFR est non-AA 38 (L) 06/23/2022 09:40 AM    eGFR 18 (L) 03/09/2023 05:04 AM    eGFR 69 01/25/2023 11:55 AM    Calcium 11.9 (H) 03/09/2023 05:04 AM       Wt Readings from Last 3 Encounters:   03/09/23 70.7 kg (155 lb 13.8 oz)   01/25/23 55.8 kg (123 lb)   01/23/23 54.9 kg (121 lb)         Intake/Output Summary (Last 24 hours) at 3/9/2023 0950  Last data filed at 3/9/2023 0900  Gross per 24 hour   Intake 734.29 ml   Output 2 ml   Net 732.29 ml Patient seen and examined. Chart reviewed. Labs, data and other pertinent notes reviewed in last 24 hrs.     PMH/SH/FH reviewed and unchanged compared to H&P      Vandana Devlin MD

## 2023-03-09 NOTE — TELEPHONE ENCOUNTER
Per PCP, follow up call regarding need for SARA. Spoke with pt spouse who voiced no knowledge of pt discharge. States Jeovanny Ashley is very ill and is still in the hospital. He cannot be discharged\" Spouse denies any knowledge of discharge planning. ACM sent TEAMS message to CM noted in chart, Tod Rather requesting call. No appointment scheduled at this time.

## 2023-03-10 NOTE — PROGRESS NOTES
Name: Mary Alfnoso   MRN: [id number]  : 1950      Assessment:  TANNER on CKD-3a: Suspect cardiorenal effects initially. Now has LVAD and  POST OP ATN. Anuric->Requiring CRRT>>now iHD  hypercalcemia/immobilization- also has elevated PTH; may have component of 1ry vs secondary HPT  Possible pancreatitis- with elevated lipase; Hypercalcemia can contribute  Ischemic CM: Recurrent exacerbations. EF 20%. S/p LVAD on 2/15. Required Impella RP for RV support-> attempts to wean not tolerated by patient. CVA  Sepsis  BPH   Well differentiated NET Grade 1: noted on EGD 23  Anemia 2 to CKD:  s/p PRBCS  Left UE basilic and radial vein thrombus  Thrombocytopenia     Was on CVVH. Did iHD on 3/6 and 3/7 and 3 /9 again. Ca better. He still has edema and CVP is elevated.   Will benefit from isolated UF treatment     Plan/Recommendations:  Isolated UF treatment-2 to 3 kg UF as tolerated  As needed IV albumin if needed  Can also use pressors if needed  Plan HD tomorrow   Ct Sensipar- i dose increased on 3 9   s/p Calcitonin x 4 doses for high Ca  Will hold off on IV Zometa or Pamidronate as last resort  BRIGHT    Discussed with RN and Deonte    Subjective:  Caridad critically ill     ROS:   UTO    Exam:  Visit Vitals  BP (!) 101/51   Pulse 99   Temp 99.8 °F (37.7 °C)   Resp 21   Ht 5' 6\" (1.676 m)   Wt 66.8 kg (147 lb 4.3 oz)   SpO2 97%   BMI 23.77 kg/m²     NAD  +icterus  +trach  LVAD hum +  Decreased breath sounds  +tr edema  + Jaundice    Current Facility-Administered Medications   Medication Dose Route Frequency Last Admin    oxyCODONE IR (ROXICODONE) tablet 5 mg  5 mg Oral Q4H PRN      oxyCODONE IR (ROXICODONE) tablet 10 mg  10 mg Oral Q4H PRN      pantoprazole (PROTONIX) 40 mg in 0.9% sodium chloride 10 mL injection  40 mg IntraVENous DAILY 40 mg at 03/10/23 0931    cinacalcet (SENSIPAR) tablet 60 mg  60 mg Oral DAILY WITH BREAKFAST 60 mg at 03/10/23 0931    insulin NPH (NOVOLIN N, HUMULIN N) injection 5 Units  5 Units SubCUTAneous Q12H 5 Units at 03/10/23 0931    cefepime (MAXIPIME) 0.5 g in 0.9% sodium chloride 100 mL IVPB  500 mg IntraVENous Q24H      labetaloL (NORMODYNE;TRANDATE) injection 5 mg  5 mg IntraVENous Q4H PRN 5 mg at 03/09/23 0307    heparin 25,000 units in D5W 250 ml infusion  400 Units/hr IntraVENous CONTINUOUS 400 Units/hr at 03/10/23 0811    levETIRAcetam (KEPPRA) injection 250 mg  250 mg IntraVENous Once per day on Tue Thu Sat 250 mg at 03/09/23 2021    0.9% sodium chloride infusion  14 mL/hr IntraVENous CONTINUOUS 14 mL/hr at 03/09/23 0757    levETIRAcetam (KEPPRA) injection 500 mg  500 mg IntraVENous DAILY 500 mg at 03/10/23 0930    albumin human 25% (BUMINATE) solution 25 g  25 g IntraVENous DIALYSIS PRN 25 g at 03/09/23 1238    insulin lispro (HUMALOG) injection   SubCUTAneous Q6H 2 Units at 03/10/23 3904    [Held by provider] 0.9% sodium chloride infusion 250 mL  250 mL IntraVENous CONTINUOUS 250 mL at 03/05/23 0926    hydrALAZINE (APRESOLINE) 20 mg/mL injection 5 mg  5 mg IntraVENous Q6H PRN 5 mg at 03/10/23 0009    hydrALAZINE (APRESOLINE) tablet 5 mg  5 mg Oral PRN      niCARdipine (CARDENE) 25 mg in 0.9% sodium chloride 250 mL ([id number])  0-15 mg/hr IntraVENous TITRATE Stopped at 03/05/23 1925    aspirin chewable tablet 81 mg  81 mg Per NG tube DAILY 81 mg at 03/10/23 0931    bicarbonate dialysis (PRISMASOL) BG K 4/Ca 2.5 5000 ml solution   Extracorporeal DIALYSIS CONTINUOUS New Bag at 03/05/23 1106    epoetin heidy-epbx (RETACRIT) injection 10,000 Units  10,000 Units SubCUTAneous Q TUE, THU & SAT 10,000 Units at 03/09/23 2022    EPINEPHrine (ADRENALIN) 10 mg in 0.9% sodium chloride 250 mL infusion  0-10 mcg/min IntraVENous TITRATE Stopped at 03/05/23 0920    propofol (DIPRIVAN) 10 mg/mL infusion  0-50 mcg/kg/min IntraVENous TITRATE Stopped at 03/02/23 8822 HYDROmorphone 30 mg/30 mL (1 mg/mL) infusion  0.5-1 mg/hr IntraVENous CONTINUOUS Stopped at 03/08/23 0516    dextrose (D50W) injection syrg 25 g  25 g IntraVENous PRN 9 mL at 02/25/23 1225    neomycin-polymyxin-dexamethasone (DEXACINE) 3.5 mg/g-10,000 unit/g-0.1 % ophthalmic ointment   Both Eyes BID Given at 03/10/23 0933    NOREPINephrine (LEVOPHED) 8 mg in 5% dextrose 250mL (32 mcg/mL) infusion  0.5-16 mcg/min IntraVENous TITRATE Stopped at 03/07/23 2100    colchicine tablet 0.6 mg  0.6 mg Oral DAILY 0.6 mg at 03/10/23 0931    heparin (porcine) 1,000 unit/mL injection 1,700 Units  1,700 Units InterCATHeter DIALYSIS PRN 1,700 Units at 03/09/23 1435    And    heparin (porcine) 1,000 unit/mL injection 1,500 Units  1,500 Units InterCATHeter DIALYSIS PRN 1,500 Units at 03/09/23 1434    balsam peru-castor oiL (VENELEX) ointment   Topical BID Given at 03/10/23 0932    acetaminophen (TYLENOL) solution 650 mg  650 mg Oral Q4H  mg at 02/20/23 0401    acetaminophen (TYLENOL) suppository 650 mg  650 mg Rectal Q4H  mg at 02/17/23 2013    HYDROmorphone (DILAUDID) injection 0.5 mg  0.5 mg IntraVENous Q3H PRN 0.5 mg at 03/09/23 1225    HYDROmorphone (DILAUDID) injection 1 mg  1 mg IntraVENous Q4H PRN 1 mg at 03/09/23 0557    [Held by provider] heparin 25,000 units in D5W 250 ml infusion  12-25 Units/kg/hr IntraVENous TITRATE Stopped at 03/08/23 0515    albumin human 25% (BUMINATE) solution 12.5 g  12.5 g IntraVENous Q6H Held at 03/10/23 0000    glucose chewable tablet 16 g  4 Tablet Oral PRN      glucagon (GLUCAGEN) injection 1 mg  1 mg IntraMUSCular PRN      sodium chloride (NS) flush 5-40 mL  5-40 mL IntraVENous Q8H 10 mL at 03/09/23 1232    sodium chloride (NS) flush 5-40 mL  5-40 mL IntraVENous PRN 40 mL at 02/17/23 1818    naloxone (NARCAN) injection 0.4 mg  0.4 mg IntraVENous PRN      ELECTROLYTE REPLACEMENT NOTE: Nurse to review Serum Potassium and Magnesuim levels and Initiate Electrolyte Replacement Protocol as needed  1 Each Other PRN      dextrose 10 % infusion 0-250 mL  0-250 mL IntraVENous PRN 75 mL at 02/24/23 0220    acetaminophen (TYLENOL) tablet 650 mg  650 mg Oral Q6H  mg at 02/17/23 0200    ondansetron (ZOFRAN) injection 4 mg  4 mg IntraVENous Q4H PRN 4 mg at 03/09/23 1212    albuterol-ipratropium (DUO-NEB) 2.5 MG-0.5 MG/3 ML  3 mL Nebulization Q6H PRN      senna-docusate (PERICOLACE) 8.6-50 mg per tablet 1 Tablet  1 Tablet Oral DAILY 1 Tablet at 03/03/23 0856    polyethylene glycol (MIRALAX) packet 17 g  17 g Oral DAILY 17 g at 03/03/23 0856    bisacodyL (DULCOLAX) suppository 10 mg  10 mg Rectal DAILY PRN 10 mg at 02/22/23 0839    0.45% sodium chloride infusion  10 mL/hr IntraVENous CONTINUOUS 10 mL/hr at 03/05/23 0751    DOBUTamine (DOBUTREX) 500 mg/250 mL (2,000 mcg/mL) infusion  0-10 mcg/kg/min IntraVENous TITRATE Stopped at 02/23/23 1044    dexmedeTOMidine in 0.9 % NaCl (PRECEDEX) 400 mcg/100 mL (4 mcg/mL) infusion soln  0.1-1.5 mcg/kg/hr IntraVENous TITRATE Stopped at 03/08/23 2300       Labs/Data:    Lab Results   Component Value Date/Time    WBC 11.4 (H) 03/10/2023 03:33 AM    HGB 7.6 (L) 03/10/2023 03:33 AM    HCT 23.5 (L) 03/10/2023 03:33 AM    PLATELET 126 80/47/1284 03:33 AM    .0 (H) 03/10/2023 03:33 AM       Lab Results   Component Value Date/Time    Sodium 137 03/10/2023 03:33 AM    Potassium 4.1 03/10/2023 03:33 AM    Chloride 102 03/10/2023 03:33 AM    CO2 20 (L) 03/10/2023 03:33 AM    Anion gap 15 03/10/2023 03:33 AM    Glucose 226 (H) 03/10/2023 03:33 AM    BUN 44 (H) 03/10/2023 03:33 AM    Creatinine 2.88 (H) 03/10/2023 03:33 AM    BUN/Creatinine ratio 15 03/10/2023 03:33 AM    GFR est AA 46 (L) 06/23/2022 09:40 AM    GFR est non-AA 38 (L) 06/23/2022 09:40 AM    eGFR 23 (L) 03/10/2023 03:33 AM    eGFR 69 01/25/2023 11:55 AM    Calcium 11.6 (H) 03/10/2023 03:33 AM       Wt Readings from Last 3 Encounters:   03/10/23 66.8 kg (147 lb 4.3 oz)   01/25/23 55.8 kg (123 lb) 01/23/23 54.9 kg (121 lb)         Intake/Output Summary (Last 24 hours) at 3/10/2023 1029  Last data filed at 3/10/2023 1000  Gross per 24 hour   Intake 1217 ml   Output 1550 ml   Net -333 ml         Patient seen and examined. Chart reviewed. Labs, data and other pertinent notes reviewed in last 24 hrs.     PMH/SH/FH reviewed and unchanged compared to H&P      Cynthia Foster MD

## 2023-03-10 NOTE — ROUTINE PROCESS
0930: PA line removed per NP    1030: ultrasound  of kidney, liver    1050: ammonia level drawn    1130: ECHO done    1200: to CT for head scan    1300: increased tube feeds to 30 cc's    1250: Heparin gtt stopped per MD    1400: ultrafiltration started    1615: ultrafiltration finished, took off 2.4 L.    1730: Jung Morton removed per MD    1900: tube feeds to 40 cc/hr    2000: Bedside shift change report given to Keyla Murphy (oncoming nurse) by Leonard Del Valle RN (offgoing nurse). Report included the following information SBAR, Procedure Summary, Intake/Output, MAR, and Recent Results. Detail Level: Zone Otc Regimen: spf 50 daily use

## 2023-03-10 NOTE — PROGRESS NOTES
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 Shelbi Calix  C/b RV failure requiring Impella RP placed 23 and discontinued 3/1/23  C/b acute on chronic renal failure, requiring CRRT  C/b hepatic failure, TB 12 (peak 15.3)  C/b lactic acidosis, persistent  C/b persistent septic shock c/b vasodilation and high outputs, on multiple antibiotics, procalcitonin persistently elevated  C/b R SUPA occlusion with small area of matched perfusion defect - subacute infarct c/b left sided hemiparesis  C/b pancreatitis  C/b failure to extubate x 2; anticipating tracheostomy today  Patient was approved for LVAD implantation as destination therapy at [facility] 2/3/23; the following have been identified and d/w patient as relative concerns pertaining to LVAD candidacy: small body surface area, cachexia, BMI 18, malnutrition, frailty, advanced age, muscular deconditioning, PVD (fingertips), urinary retention requiring donnelly catheter, neuroendocrine tumor class 1 requiring treatment after LVAD, mild neurocognitive dysfunction, chronic kidney disease and hearing loss requiring hearing aid  Family meeting today at 200 with Dr. Amanda Kent:  Continue LVAD at 4600rpm  Repeat TTE today  DC PA cath   Head CT due to decreased mental status  Transfuse 1 unit PRBC today   Exchange HD cath   Hepatology consult   Continue cefepime  Pan cx pending   No beta-blockers due to hypotension and RV conditioning prior to LVAD  Cannot tolerate ARB/ARNi due to hypotension and upcoming surgical procedure   Cannot tolerate spironolactone due to hyperkalemia  Cannot tolerate jardiance due to significant diuresis on smallest dose/contributed to IVVD  Previously discontinued corlanor due to SVT  Digoxin stopped d/t risk for toxicity with amio loading  Discontinued amio due to intermitted heart blocks under pacemaker  HD per nephrology T/TR/SAT ; goal CVP 10-12  UF on alternative days   Keep K+ >4 and Mg >2   ID recommendations appreciated- will culture today   Continue colchicine 0.6mg daily for possible pericarditis  Hold ASA d/t CVA   Continue to hold coumadin  Continue heparin gtt per protocol  Cannot tolerate statins due to abnormal LFT  Management of tube feeds in light of pancreatitis per intensivist  Recheck lipase tomorrow   Continue bowel regimen   Daily dressing changes to Drive line exit site  Pulmonary hygiene- continue SBT   VAD education with caregivers/patient when able by VAD coordinator  D/w Dr. Nimco Cruz and bedside staff      INTERVAL HISTORY:  TMax 99  CI . 2  MAPs 80s, HR 90-100s  CVP 17-20  I/O net negative 400ml  Hgb stable at 7.6  Ca down to 11.6  Cr 2.88  TB up to 16.9  LFTs trending up today   PBNP up to [address]  Lactic  2.4- trending down   Still oozing from trach site        IMPRESSION:  Acute on chronic combined systolic/diastolic  heart failure  Stage D, NYHA class IV symptoms   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, LVEF 20% (by echo 1/2023) and 23% (by cMRI 1/3/23)  Cardiac MRI suggestive of ischemic cardiomyopathy  PYP equivocal  S/p HeartMate 3  LVAD-DT (2/15/23)  C/b RV failure requiring Impella RP placed 2/18/23 and discontinued 3/1/23  C/b acute on chronic renal failure, requiring CRRT  C/b hepatic failure, TB 12 (peak 15.3)  C/b lactic acidosis, persistent  C/b persistent septic shock c/b vasodilation and high outputs, on multiple antibiotics, procalcitonin persistently elevated  C/b R SUPA occlusion with small area of matched perfusion defect - subacute infarct c/b left sided hemiparesis  C/b pancreatitis  C/b failure to extubate x 2; anticipating tracheostomy this week  Coronary artery disease  CAD s/p CABG x 2: further disease best managed medically due to small vessel size   At risk of sudden cardiac death  Peripheral arterial disease  Bilateral hydronephrosis s/p andrzej  Cardiac risk factors:  HTN  HL  TRISTAN, STOP-BANG 4  DM2  CKD, stage 3  MOCA from 2/9 19/30, consistent with mild cognitive impairment  Hard of hearing  Gastric Neuroendocrine Tumor, elevated gastrin and chromogranin A  Septic shock, improving   Left sided weakness noted night 3/1. +SAH and subacute occlusion distal R cerebral artery         LIFE GOALS: not readdressed today   Patient's personal goals included: having a few more years with family  Important upcoming milestones or family events: None at this time   The patient identified the following as important for living well: being at home, not being SOB            CXR (3/5/23) mild pulmonary edema. Small pleural effusions and bibasilar airspace opacities. Head CTA (3/2/23) Occlusion distal right anterior cerebral artery, with associated small area of matched perfusion defect and cortical enhancement, consistent with subacute infarct. Diminutive and irregular right vertebral artery, occluded at the V3-4 junction, age indeterminate extensive multifocal atherosclerosis in the intracranial and extracranial circulation. CARDIAC IMAGING:   ECHO- 3/6/23       Left Ventricle: Severely reduced left ventricular systolic function with a visually estimated EF of 15 - 20%. Left ventricle is moderately dilated. LVAD is present. Right Ventricle: Right ventricle is moderately dilated. Mildly reduced systolic function. ECHO (3/2/23) EF 20-25%; LV mod dilated; severe global hypokinesis; RV mod dilated; mildly reduced systolic function;    Echo 2/19/23    Left Ventricle: Severely reduced left ventricular systolic function with a visually estimated EF of 20 - 25%. Not well visualized. Left ventricle size is normal. Increased wall thickness. Unable to assess wall motion. Abnormal diastolic function. LVAD is present. LVAD inflow cannula was not well visualized. Right Ventricle: Not well visualized. Right ventricle is mildly dilated. Moderately reduced systolic function. TAPSE is abnormal.    Mitral Valve: Severe annular calcification at the anterior and posterior leaflets of the mitral valve. Mild regurgitation. Left Atrium: Left atrium is dilated. Right Atrium: Right atrium is dilated. Technical qualifiers: Echo study was technically difficult and technically difficult with poor endocardial visualization. Echo 1/27/23    Left Ventricle: EF by visual approximation is 20%. Left ventricle is mildly dilated. Mitral Valve: Moderately thickened leaflet, at the anterior and posterior leaflets. Moderately calcified leaflet. Moderate regurgitation. Left Atrium: Left atrium is moderately dilated. Technical qualifiers: Echo study was technically difficult with poor endocardial visualization. Contrast used: Definity. Limited study, not all structures viewed     Echo 1/9/23   Left Ventricle: Severely reduced left ventricular systolic function with a visually estimated EF of 25 - 30%. Left ventricle size is normal. Mildly increased wall thickness. Echo 12/26/22    Left Ventricle: Severely reduced left ventricular systolic function with a visually estimated EF of 25 - 30%. Left ventricle size is normal. Normal wall thickness. There are regional wall motion abnormalities. Grade II diastolic dysfunction with increased LAP. Right Ventricle: Moderately reduced systolic function. TAPSE is abnormal. TAPSE is 1.1 cm. Aortic Valve: Mild stenosis of the aortic valve. AV peak gradient is 13 mmHg. AV peak velocity is 1.8 m/s. Mitral Valve: Not well visualized. Moderate annular calcification at the posterior leaflet of the mitral valve.  Mild to moderate regurgitation. Tricuspid Valve: Mildly elevated RVSP. Left Atrium: Left atrium is moderately dilated. 12/8/22    Left Ventricle: Moderately reduced left ventricular systolic function with a visually estimated EF of 35 - 40%. Severe hypokinesis of the following segments: mid anteroseptal, apical anterior, apical septal, apical inferior and apical lateral. Severe hypokinesis of the apex. Mitral Valve: Severely thickened leaflet, at the anterior and posterior leaflets. Severely calcified leaflet, at the anterior and posterior leaflets. Mild annular calcification of the mitral valve. Moderate regurgitation. Left Atrium: Left atrium is mildly dilated. Contrast used: Definity. limited study     EKG 12/22/22 ST, Biatria enlargement, marked ST abnormality     [facility] 12/6/22  1. Normal LVEDP  2. Severe native multivessel coronary artery disease  3. Patent LIMA to LAD and vein graft to distal RCA  4. Recurrent ISR in OM1 stent with now 60 to 70% restenosis  5. Recoil of left main and circumflex stent with now recurrent 40 to 50% stenosis. 6.  Progression of ostial left main disease now to about 60% stenosis  7. Progression of disease in jailed first marginal branch now with diffuse 90% stenosis  8.   High-grade stenosis in the mid to distal right potential femoral artery treated with 6 x 40 mm impact drug-coated balloon angioplasty to reduce the stenosis to less than 40%        HEMODYNAMICS:  RHC 1/9/23  PA 20/9, RA 3, PCWP 8, CI 1.8     CPEST too ill   6MW 300 feet    LVAD INTERROGATION:  Device interrogated in person  Device function normal, normal flow, no events  LVAD   Pump Speed (RPM): 4600  Pump Flow (LPM): 2.8  MAP: 80  PI (Pulsitility Index): 8.3  Power: 2.9   Test: No  Back Up  at Bedside & Labeled: Yes  Power Module Test: No  Driveline Site Care: No  Driveline Dressing: Clean, Dry, and Intact  Testing  Alarms Reviewed: Yes  Back up SC speed: 4600  Back up Low Speed Limit: 4200  Emergency Equipment with Patient?: Yes  Emergency procedures reviewed?: No  Drive line site inspected?: Yes  Drive line intergrity inspected?: Yes  Drive line dressing changed?: No    PHYSICAL EXAM:  Visit Vitals  BP (!) 101/51   Pulse 93   Temp 98 °F (36.7 °C)   Resp 11   Ht 5' 6\" (1.676 m)   Wt 147 lb 4.3 oz (66.8 kg)   SpO2 100%   BMI 23.77 kg/m²     Hemodynamics:   CO: CO (l/min): 4.8 l/min   CI: CI (l/min/m2): 2.7 l/min/m2   CVP: CVP (mmHg): 16 mmHg (03/10/23 1300)   SVR: SVR (dyne*sec)/cm5: 1106 (dyne*sec)/cm5 (03/10/23 3624)   PAP Systolic: PAP Systolic: 44 (38/02/73 9918)   PAP Diastolic: PAP Diastolic: 23 (54/85/30 4261)   PVR:     SV02: SVO2 (%): 72 % (03/10/23 0900)   SCV02:        Physical Assessment:   Physical Exam  Vitals and nursing note reviewed. Constitutional:       Appearance: He is ill-appearing. Cardiovascular:      Rate and Rhythm: Regular rhythm. Tachycardia present. Heart sounds: Normal heart sounds. No murmur heard. Comments: + VAD hum  Pulmonary:      Breath sounds: Rhonchi present. Comments: intubated  Abdominal:      General: There is distension. Palpations: Abdomen is soft. Musculoskeletal:         General: No swelling. Skin:     General: Skin is warm and dry. Coloration: Skin is jaundiced. Neurological:      Mental Status: He is alert.       Comments: sedated              REVIEW OF SYSTEMS:  Review of Systems   Unable to perform ROS: Acuity of condition      PAST MEDICAL HISTORY:  Past Medical History:   Diagnosis Date    CAD (coronary artery disease) 11/10/2016    NSTEMI & 2 stents    Deafness 10/28/2012    DM (diabetes mellitus) ([facility] [address]     Elevated cholesterol     Hypertension     NSTEMI (non-ST elevated myocardial infarction) ([facility] [address] 11/10/2016       PAST SURGICAL HISTORY:  Past Surgical History:   Procedure Laterality Date    COLONOSCOPY N/A 6/28/2018    COLONOSCOPY performed by Libertad Campos MD at [address] [place] [address] N/A 1/18/2023    COLONOSCOPY performed by Marcy Reilly MD at [facility] ENDOSCOPY    [address]  11/11/2016    2 stents       FAMILY HISTORY:  Family History   Problem Relation Age of Onset    Heart Disease Father     Heart Attack Father     Hypertension Mother     Elevated Lipids Brother     Elevated Lipids Brother     No Known Problems Sister     Elevated Lipids Brother     No Known Problems Son     No Known Problems Daughter     Anesth Problems Neg Hx        SOCIAL HISTORY:  Social History     Socioeconomic History    Marital status:    Tobacco Use    Smoking status: Never     Passive exposure: Never    Smokeless tobacco: Never   Vaping Use    Vaping Use: Never used   Substance and Sexual Activity    Alcohol use: Yes     Alcohol/week: 2.0 standard drinks     Types: 1 Cans of beer, 1 Shots of liquor per week     Comment: rarely    Drug use: No    Sexual activity: Yes     Social Determinants of Health     Financial Resource Strain: Medium Risk    Difficulty of Paying Living Expenses: Somewhat hard   Food Insecurity: Food Insecurity Present    Worried About Running Out of Food in the Last Year: Never true    Ran Out of Food in the Last Year: Often true       LABORATORY RESULTS:     Labs Latest Ref Rng & Units 3/10/2023 3/9/2023 3/8/2023 3/8/2023 3/8/2023 3/7/2023 3/6/2023   WBC 4.1 - 11.1 K/uL 11. 4(H) 11.0 - 7.6 - 7.4 7.4   RBC 4.10 - 5.70 M/uL 2.35(L) 2.53(L) - 2.54(L) - 2.71(L) 2.79(L)   Hemoglobin 12.1 - 17.0 g/dL 7. 6(L) 7. 9(L) 8.0(L) 7. 9(L) - 8. 6(L) 8.8(L)   Hematocrit 36.6 - 50.3 % 23. 5(L) 25. 7(L) 25. 5(L) 25. 6(L) - 27. 1(L) 27. 9(L)   MCV 80.0 - 99.0 . 0(H) 101. 6(H) - 100. 8(H) - 100. 0(H) 100. 0(H)   Platelets 437 - 988 K/uL 337 358 - 254 - 255 253   Lymphocytes 12 - 49 % 6(L) 11(L) - - - - -   Monocytes 5 - 13 % 13 12 - - - - -   Eosinophils 0 - 7 % 0 0 - - - - -   Basophils 0 - 1 % 0 1 - - - - -   Albumin 3.5 - 5.0 g/dL 4.0 4.0 - - 4.1 3.6 3.6   Calcium 8.5 - 10.1 MG/DL 11. 6(H) 11. 9(H) - - 10. 6(H) 10. 9(H) 13. 2(HH)   Glucose 65 - 100 mg/dL 226(H) 126(H) - - 223(H) 165(H) 134(H)   BUN 6 - 20 MG/DL 44(H) 56(H) - - 30(H) 30(H) 36(H)   Creatinine 0.70 - 1.30 MG/DL 2.88(H) 3.53(H) - - 2.33(H) 2.32(H) 2.39(H)   Sodium 136 - 145 mmol/L 137 137 - - 137 136 139   Potassium 3.5 - 5.1 mmol/L 4.1 5.0 - - 4.3 4.5 4.8   TSH 0.36 - 3.74 uIU/mL - - - - - - -   PSA 0.01 - 4.0 ng/mL - - - - - - -   LDH 85 - 241 U/L 342(H) 357(H) - - 262(H) 256(H) 248(H)   Some recent data might be hidden     Lab Results   Component Value Date/Time    TSH 3.52 01/28/2023 05:26 AM    TSH 2.12 12/27/2022 02:36 PM    TSH 4.80 (H) 12/06/2022 03:53 AM    TSH 5.39 (H) 10/12/2022 09:10 AM    TSH 3.53 02/03/2022 11:47 AM    TSH 5.790 (H) 11/21/2019 04:45 PM    TSH 3.08 06/22/2018 01:53 PM    TSH 4.250 05/26/2015 09:43 AM       ALLERGY:  Allergies   Allergen Reactions    Ambien [Zolpidem] Other (comments)     Causes extreme confusion        CURRENT MEDICATIONS:    Current Facility-Administered Medications:     0.9% sodium chloride infusion 250 mL, 250 mL, IntraVENous, PRN, Lizette Linton, NP    oxyCODONE IR (ROXICODONE) tablet 5 mg, 5 mg, Oral, Q4H PRN, Lorelie Spike, David G, DO    oxyCODONE IR (ROXICODONE) tablet 10 mg, 10 mg, Oral, Q4H PRN, Lorelie Spike, David G, DO    pantoprazole (PROTONIX) 40 mg in 0.9% sodium chloride 10 mL injection, 40 mg, IntraVENous, DAILY, Prosper Ruiz, Walker MOLINA MD, 40 mg at 03/10/23 0931    cinacalcet (SENSIPAR) tablet 60 mg, 60 mg, Oral, DAILY WITH BREAKFAST, Darnell Valdivia MD, 60 mg at 03/10/23 0931    insulin NPH (NOVOLIN N, HUMULIN N) injection 5 Units, 5 Units, SubCUTAneous, Q12H, Cathy Sheldon, CNS, 5 Units at 03/10/23 0931    [COMPLETED] cefepime (MAXIPIME) 2 g in 0.9% sodium chloride 10 mL IV syringe, 2 g, IntraVENous, NOW, 2 g at 03/09/23 1956 **FOLLOWED BY** cefepime (MAXIPIME) 0.5 g in 0.9% sodium chloride 100 mL IVPB, 500 mg, IntraVENous, Q24H, Sylvie Rushing MD labetaloL (NORMODYNE;TRANDATE) injection 5 mg, 5 mg, IntraVENous, Q4H PRN, Walker Quinonez MD, 5 mg at 03/09/23 0307    heparin 25,000 units in D5W 250 ml infusion, 400 Units/hr, IntraVENous, CONTINUOUS, Lizette Linton NP, Last Rate: 4 mL/hr at 03/10/23 0811, 400 Units/hr at 03/10/23 0811    levETIRAcetam (KEPPRA) injection 250 mg, 250 mg, IntraVENous, Once per day on Tue Thu Sat, Juanito Mendoza MD, 250 mg at 03/09/23 2021    0.9% sodium chloride infusion, 14 mL/hr, IntraVENous, CONTINUOUS, Emma MOLINA MD, Last Rate: 14 mL/hr at 03/09/23 0757, 14 mL/hr at 03/09/23 0757    levETIRAcetam (KEPPRA) injection 500 mg, 500 mg, IntraVENous, DAILY, Walker Aponte MD, 500 mg at 03/10/23 0930    albumin human 25% (BUMINATE) solution 25 g, 25 g, IntraVENous, DIALYSIS PRN, Ramesh Wong MD, 25 g at 03/09/23 1238    insulin lispro (HUMALOG) injection, , SubCUTAneous, Q6H, Walker Aponte MD, 3 Units at 03/10/23 1314    [Held by provider] 0.9% sodium chloride infusion 250 mL, 250 mL, IntraVENous, CONTINUOUS, Adan Carrasco MD, 250 mL at 03/05/23 0926    hydrALAZINE (APRESOLINE) 20 mg/mL injection 5 mg, 5 mg, IntraVENous, Q6H PRN, Adan Carrasco MD, 5 mg at 03/10/23 1316    hydrALAZINE (APRESOLINE) tablet 5 mg, 5 mg, Oral, PRN, Adan Carrasco MD    niCARdipine (CARDENE) 25 mg in 0.9% sodium chloride 250 mL (Tpur0Hlt), 0-15 mg/hr, IntraVENous, TITRATE, Jaky Nunez DO, Stopped at 03/05/23 1925    aspirin chewable tablet 81 mg, 81 mg, Per NG tube, DAILY, Roni Galeazzi, MD, 81 mg at 03/10/23 0931    bicarbonate dialysis (PRISMASOL) BG K 4/Ca 2.5 5000 ml solution, , Extracorporeal, DIALYSIS CONTINUOUS, Jesu Schultz MD, Last Rate: 1,750 mL/hr at 03/05/23 1106, New Bag at 03/05/23 1106    epoetin heidy-epbx (RETACRIT) injection 10,000 Units, 10,000 Units, SubCUTAneous, Q TUE, THU & SAT, Jesu Schultz MD, 10,000 Units at 03/09/23 2022    EPINEPHrine (ADRENALIN) 10 mg in 0.9% sodium chloride 250 mL infusion, 0-10 mcg/min, IntraVENous, TITRATE, Master Johnson MD, Stopped at 03/05/23 0920    propofol (DIPRIVAN) 10 mg/mL infusion, 0-50 mcg/kg/min, IntraVENous, TITRATE, Tanya Groves MD, Stopped at 03/02/23 1628    HYDROmorphone 30 mg/30 mL (1 mg/mL) infusion, 0.5-1 mg/hr, IntraVENous, CONTINUOUS, Master Johnson MD, Stopped at 03/08/23 0516    dextrose (D50W) injection syrg 25 g, 25 g, IntraVENous, PRN, Kapil Pena MD, 9 mL at 02/25/23 1225    neomycin-polymyxin-dexamethasone (Dallis Exon) 3.5 mg/g-10,000 unit/g-0.1 % ophthalmic ointment, , Both Eyes, BID, Sergio Collier MD, Given at 03/10/23 6737    NOREPINephrine (LEVOPHED) 8 mg in 5% dextrose 250mL (32 mcg/mL) infusion, 0.5-16 mcg/min, IntraVENous, TITRATE, Master Johnson MD, Stopped at 03/07/23 2100    colchicine tablet 0.6 mg, 0.6 mg, Oral, DAILY, Lizette Linton, NP, 0.6 mg at 03/10/23 0931    heparin (porcine) 1,000 unit/mL injection 1,700 Units, 1,700 Units, InterCATHeter, DIALYSIS PRN, 1,700 Units at 03/09/23 1435 **AND** heparin (porcine) 1,000 unit/mL injection 1,500 Units, 1,500 Units, InterCATHeter, DIALYSIS PRN, Jarrell Mckinnon, DO, 1,500 Units at 03/09/23 1434    balsam peru-castor oiL (VENELEX) ointment, , Topical, BID, Tanya Groves MD, Given at 03/10/23 0932    acetaminophen (TYLENOL) solution 650 mg, 650 mg, Oral, Q4H PRN, Kapil Pena MD, 650 mg at 02/20/23 0401    acetaminophen (TYLENOL) suppository 650 mg, 650 mg, Rectal, Q4H PRN, Kapil Pena MD, 650 mg at 02/17/23 2013    HYDROmorphone (DILAUDID) injection 0.5 mg, 0.5 mg, IntraVENous, Q3H PRN, Miya MOLINA MD, 0.5 mg at 03/09/23 1225    HYDROmorphone (DILAUDID) injection 1 mg, 1 mg, IntraVENous, Q4H PRN, Miya MOLINA MD, 1 mg at 03/09/23 0557    [Held by provider] heparin 25,000 units in D5W 250 ml infusion, 12-25 Units/kg/hr, IntraVENous, TITRATE, Danuta Viveros MD, Stopped at 03/08/23 0515    albumin human 25% (BUMINATE) solution 12.5 g, 12.5 g, IntraVENous, Q6H, Jack Del Toro MD, Last Rate: 60 mL/hr at 02/27/23 0020, 12.5 g at 03/10/23 1316    glucose chewable tablet 16 g, 4 Tablet, Oral, PRN, Shaila, Lizette B, NP    glucagon (GLUCAGEN) injection 1 mg, 1 mg, IntraMUSCular, PRN, Shaila, Lizette B, NP    sodium chloride (NS) flush 5-40 mL, 5-40 mL, IntraVENous, Q8H, Shaila, Lizette B, NP, 10 mL at 03/09/23 1232    sodium chloride (NS) flush 5-40 mL, 5-40 mL, IntraVENous, PRN, Shaila, Lizette B, NP, 40 mL at 02/17/23 1818    naloxone (NARCAN) injection 0.4 mg, 0.4 mg, IntraVENous, PRN, Shaila, Lizette B, NP    ELECTROLYTE REPLACEMENT NOTE: Nurse to review Serum Potassium and Magnesuim levels and Initiate Electrolyte Replacement Protocol as needed, 1 Each, Other, PRN, Shaila, Lizette B, NP    dextrose 10 % infusion 0-250 mL, 0-250 mL, IntraVENous, PRN, Shaila, Lizette B, NP, Last Rate: 150 mL/hr at 02/24/23 0220, 75 mL at 02/24/23 0220    acetaminophen (TYLENOL) tablet 650 mg, 650 mg, Oral, Q6H PRN, Shaila, Lizette B, NP, 650 mg at 02/17/23 0200    ondansetron (ZOFRAN) injection 4 mg, 4 mg, IntraVENous, Q4H PRN, Shaila, Lizette B, NP, 4 mg at 03/09/23 1212    albuterol-ipratropium (DUO-NEB) 2.5 MG-0.5 MG/3 ML, 3 mL, Nebulization, Q6H PRN, Shaila, Lizette B, NP    senna-docusate (PERICOLACE) 8.6-50 mg per tablet 1 Tablet, 1 Tablet, Oral, DAILY, Shaila, Lizette B, NP, 1 Tablet at 03/03/23 0856    polyethylene glycol (MIRALAX) packet 17 g, 17 g, Oral, DAILY, Shaila, Lizette B, NP, 17 g at 03/03/23 0856    bisacodyL (DULCOLAX) suppository 10 mg, 10 mg, Rectal, DAILY PRN, Shaila, Lizette B, NP, 10 mg at 02/22/23 0839    0.45% sodium chloride infusion, 10 mL/hr, IntraVENous, CONTINUOUS, Jack Del Toro MD, Last Rate: 10 mL/hr at 03/05/23 0751, 10 mL/hr at 03/05/23 0751    DOBUTamine (DOBUTREX) 500 mg/250 mL (2,000 mcg/mL) infusion, 0-10 mcg/kg/min, IntraVENous, TITRATE, Ashia Johnson MD, Stopped at 02/23/23 1044    dexmedeTOMidine in 0.9 % NaCl (PRECEDEX) 400 mcg/100 mL (4 mcg/mL) infusion soln, 0.1-1.5 mcg/kg/hr, IntraVENous, TITRATE, Edson Johnson MD, Stopped at 03/08/23 2300    PATIENT CARE TEAM:  Patient Care Team:  Berny Cedeno MD as PCP - General (Family Medicine)  Berny Cedeno MD as PCP - [facility] Empaneled Provider  Jan Mckeon MD (Cardiovascular Disease Physician)  Porsha Lebron MD (Gastroenterology)  Jewel Woody MD (Cardiothoracic Surgery)  Nathen Thomas MD (Cardiovascular Disease Physician)  Josi Berry MD (Nephrology)  Jesse Kohler MD (Pulmonary Disease)  Kamila Wilson MD ([address] [place] [address] Vascular Surgery)     Thank you for allowing me to participate in this patient's care. Kathy Lewis NP   [address] [place] [address], [address]  Phone: [phone]    Total Critical Care time spent:    50 minutes. There was no overlap with other services        Critical care was necessary to treat or prevent imminent or life threatening deterioration of the following conditions: cardiac failure, respiratory failure and CNS failure or compromise     Services Provided:  1. Telemetry review and 12 lead ECG interpretation  2. Hemodynamic interpretation, assessment, and management  3. Review and interpretation of CXR  4. Review and interpretation of lab values  5. Review and interpretation of microbiologic data and culture results  6. Review of medications and administration  7. Review and interpretation of nutrition requirements and management  8. Discussion of management withother consultants and services  9.  Clinical update to family members

## 2023-03-10 NOTE — PROCEDURES
[facility] / [phone]    Vitals Pre Post Assessment Pre Post   BP BP: (!) 92/54 (03/10/23 1500)   106/54 LOC Eyes open to voice, no command following noted at this time, withdraws extremities to pain. Eyes open to voice, no command following noted at this time, withdraws extremities to pain. HR Pulse (Heart Rate): 92 (03/10/23 1500) 93 Lungs Coarse, diminished Coarse, diminished   Resp Resp Rate: 23 (03/10/23 1500) 25 Cardiac LVAD LVAD   Temp Temp: 98.2 °F (36.8 °C) (03/10/23 1355)  Skin CDI CDI   Weight  Not obtained Not obtained Edema +4pitting BLE +4pitting BLE   Tele status Bedside Bedside Pain Pain Intensity 1: 0 (03/10/23 0800) No c/o pain, appears to be resting comfortably in bed     Orders   Duration: Start: 1414 End: 1616 Total: 2   Dialyzer: Dialyzer/Set Up Inspection: Revaclear (03/10/23 1414)   K Bath: Dialysate K (mEq/L):  (seq) (03/10/23 1414)   Ca Bath: Dialysate CA (mEq/L):  (seq) (03/10/23 1414)   Na: Dialysate NA (mEq/L): seq (03/10/23 1414)   Bicarb: Dialysate HCO3 (mEq/L):  (seq) (03/10/23 1414)   Target Fluid Removal: Goal/Amount of Fluid to Remove (mL): 2500 mL (03/10/23 1414)     Access   Type & Location: L CVC   Comments:  Dressing CDI, dated 3/8/23. Skin at CVC site appropriate for patient's ethnicity without redness, swelling, or drainage noted. CVC limbs cleaned per P&P, caps removed, hubs disinfected per P&P. 5cc blood aspirated swiftly from each limb, followed by 10cc NS flush, no resistance noted, no signs of swelling, no c/o pain with flush.                                        Labs   HBsAg (Antigen) / date:        Negative               2/18/2023                        HBsAb (Antibody) / date: Susceptible                  2/18/2023   Source:    Obtained/Reviewed  Critical Results Called HGB   Date Value Ref Range Status   03/10/2023 7.6 (L) 12.1 - 17.0 g/dL Final     Potassium   Date Value Ref Range Status   03/10/2023 4.1 3.5 - 5.1 mmol/L Final     Calcium   Date Value Ref Range Status   03/10/2023 11.6 (H) 8.5 - 10.1 MG/DL Final     BUN   Date Value Ref Range Status   03/10/2023 44 (H) 6 - 20 MG/DL Final     Creatinine   Date Value Ref Range Status   03/10/2023 2.88 (H) 0.70 - 1.30 MG/DL Final        Meds Given   Name Dose Route   Albumin 25g IVPB   Heparin 3200 units (1500/1700) Catheter dwell          Adequacy / Fluid    Total Liters Process:    Net Fluid Removed: 2365      Comments   Time Out Done:   (Time) 1400   Admitting Diagnosis: TANNER on CKD 3   Consent obtained/signed: Informed Consent Verified: Yes (03/10/23 1414)   Machine / RO # Machine Number: [id number] (03/10/23 1414)   Primary Nurse Rpt Pre: Rosalina Millan RN   Primary Nurse Rpt Post: Rosalina Millan RN   Pt Education: Infection prevention   Care Plan: Continue HD plan of care   Pts outpatient clinic: None     Tx Summary   Comments:                3135 - UF initiated without incident. Blood pump running well at 250.            1616 - All possible blood returned to patient. CVC limbs cleaned per P&P, flushed with 10cc NS, followed by ordered Heparin dwell. Limbs clamped and secured with new clear guard caps. VSS at end of HD treatment. Report to NILESH Hinojosa RN.

## 2023-03-10 NOTE — PROGRESS NOTES
Bedside and Verbal shift change report given to Adam Majaylen Mirza (oncoming nurse) by Mandy Gardner RN (offgoing nurse). Report included the following information SBAR, OR Summary, Intake/Output, MAR, and Cardiac Rhythm nsr . PRN hydralazine for MAP >80 w/ decreasing LVAD flows  No low flow alarms tonight    CHG bath  BMX2    Bedside and Verbal shift change report given to Danica RN (oncoming nurse) by Regino Campos RN (offgoing nurse). Report included the following information SBAR, Kardex, OR Summary, and Intake/Output.

## 2023-03-10 NOTE — PROGRESS NOTES
SLP continuing to follow for in-line Patient is currently on the ventilator and therefore will confer with RT as to appropriateness for in-line PMV. Of note, in-line PMV parameters are as follows and RR remains above current parameters:    -Can tolerate cuff deflation on the ventilator  -FiO2 below 50%  -PEEP less than 10  -PIP less than 40  -RR of 18 or less    Thank you,  YANDEL MartinezCD.  [id number] [facility]   Speech-Language Pathologist

## 2023-03-10 NOTE — PROGRESS NOTES
Physical Therapy  3/10/2023    Order received, chart reviewed. Attempted to see patient for PT evaluation but unable to complete evaluation due to patient being unresponsive. Despite loud verbal cues at ear, nail bed pressure, and ROM to extremities, patient without response and not opening eyes. Noted clonus in R ankle with PROM. Reviewed importance of provided sensory input to patient that was comforting with patient's spouse who was present (music, smells, speaking/reading). Will follow up Sunday for attempt at eval. Note RNs present and preparing to transport to head CT. Will follow. Thank you.     Janis Rosas, PT, DPT

## 2023-03-10 NOTE — PROGRESS NOTES
Occupational Therapy  3/10/2023    Attempted OT evaluation on this date, cleared for bed level activity by RN. Patient received asleep, on ventilator via trach, with spouse at bedside. Provided multimodal cueing (gentle BUE/BLE ROM, pressure to nail beds, verbal cueing), however unable to arose. Patients spouse educated on positioning, providing therapeutic sensory input (music, gentle touch, gentle ROM) - receptive to all education. At conclusion of attempt, RN at bedside to transport patient EVAN for head CT. Will continue to follow. Thank you.      Naty Dumont, OTD, OTR/L

## 2023-03-11 NOTE — CONSULTS
[id number] [facility] [place], MD, FACP, Patience Yoselyn Pathak, [place]      Maria Dolores Gil, PETRA Pugh, PCNP-BC   Mary King, [facility]-   Luis F Will, KATIE Guido, FNP-DEBI Eid, AGPCNP-BC      [address]   at [address], [address], [place].   [phone]   FAX: [phone]  [facility] [place] [facility]   at [address], [facility], [address]   [phone]   FAX: [phone]       HEPATOLOGY CONSULT NOTE  I was asked to see this patient in consultation for management of elevated liver enzymes and jaundice. I have reviewed the Notes by all other physicians who have seen the patient during this hospitalization to date. I have reviewed the problem list, all past medical history and the reason for this hospitalization. I have reviewed the allergies and the medications the patient was taking at home prior to this hospitalization. HISTORY:  The patient is a 71 y.o. Black male with advanced chronic CHF. He has been hospitalized since 12/2022. ECHO in 1/2023 demonstrated LVEF 20% with normal RV. At that time AST/ALT ALP/TBILI were 46/92/219/0.3    He had an LVAD placed on 2/16/2023. Just prior to that AST/ALT/ALP/TBILI were 82/23/51/4.8    He developed RV failure following LVAD placement and Impella was placed on 2/19/2023. Just prior to that AST/ALT/ALP /TBILI were 836/145/42/7.2    He had Impella removed on 3/1/2023. Just prior to that AST/ALT/ALP/TBILI were 105/37/132/13.7    The most recent AST/ALT/ALP/TBILI were 175/107/239/17.7  Liver Ultrasound demonstrated steatosis and hepatomegaly.   No obvious cirrhosis  ECHO heart LVAD in place, LV dilated, LVEF 25-30%, estimated PAP 40 mm Hg, V dilated, mild-moderate TR    ASSESSMENT AND PLAN:  Elevated liver enzymes  The patient is having ongoing liver injury due to a combination of right and left heart failure. Left heart failure has resulted in shock liver and hepatic ischemia due to hypotension    Right heart failure has resulted in passive hepatic congestion, and due to PA HTN, RV dilation and mild-moderate TR. This apparently developed after the LVAD was placed. Baseline ECHO demonstrated normal RV. After LVAD placed there was a marked rise in AST from 82 to the 836 with lesser change in ALT from 23 to 145 and rise in TBILI from 4.8 to 7.2. This suggests to me that he had a cardiac event, probably RV MI as ECHO after that event demonstrated RV dilation. Following this event there has been a gradual decline in AST/ALT but a progressive rise in TBILI to 17.7 mg.    A CT scan in 12/2022 and liver US in 1/2023 did not suggest cirrhosis. I doubt he has cirrhosis now. I suspect the change in liver morphology on US that led radiologist to suggest cirrhosis is present is siply to to severe passive congestion and swelling of the liver. However, continued severe biventricualr failure can result in cirrhosis over time. Bottom line, elevation in liver enzymes and TBILI at this time are due to biventricular heart failure and would improve if pump improves. SYSTEM REVIEW NOT RELATED TO LIVER DISEASE OR REVIEWED ABOVE:  The patient is intubated and sedated. ROS cannot be obtained    FAMILY HISTORY:  The patient is intubated and sedated. FH cannot be obtained    SOCIAL HISTORY:  The patient is intubated and sedated. SH cannot be obtained      PHYSICAL EXAMINATION:  VS: per nursing note  General:  Intubated and sedated. Eyes:  Sclera icteric  ENT:  No oral lesions. Thyroid normal.  Nodes:  No adenopathy. Skin:  No spider angiomata. Jaundice. Respiratory:  Lungs clear to auscultation. Cardiovascular:  Regular heart rate. Abdomen:  Soft non-tender, No obvious ascites. Extremities:  No lower extremity edema. Neurologic:  Sedated. LABORATORY:   Latest Reference Range & Units 3/8/23 04:11 3/9/23 05:04 3/10/23 03:33   WBC 4.1 - 11.1 K/uL 7.6 11.0 11.4 (H)   HGB 12.1 - 17.0 g/dL 7.9 (L) 7.9 (L) 7.6 (L)   PLATELET 088 - 104 K/uL 254 358 337   INR 0.9 - 1.1   1.5 (H) 1.8 (H) 2.0 (H)   Sodium 136 - 145 mmol/L 137 137 137   Potassium 3.5 - 5.1 mmol/L 4.3 5.0 4.1   Chloride 97 - 108 mmol/L 104 104 102   CO2 21 - 32 mmol/L 23 19 (L) 20 (L)   Glucose 65 - 100 mg/dL 223 (H) 126 (H) 226 (H)   BUN 6 - 20 MG/DL 30 (H) 56 (H) 44 (H)   Creatinine 0.70 - 1.30 MG/DL 2.33 (H) 3.53 (H) 2.88 (H)   Bilirubin, total 0.2 - 1.0 MG/DL 12.2 (H) 14.3 (H) 16.9 (H)   Albumin 3.5 - 5.0 g/dL 4.1 4.0 4.0   ALT 12 - 78 U/L 92 (H) 113 (H) 113 (H)   AST 15 - 37 U/L 178 (H) 236 (H) 205 (H)   Alk.  phosphatase 45 - 117 U/L 181 (H) 180 (H) 235 (H)   Ammonia, plasma <32 UMOL/L   25   (H): Data is abnormally high  (L): Data is abnormally low        Malgorzata Turner MD  [address]  [address]  [place]  [place]. [phone]  FAX:  [phone]

## 2023-03-11 NOTE — PROCEDURES
[facility]                      [phone]  Vitals Pre Post Assessment Pre Post   BP BP: 105/63 (03/11/23 1315) 107/60 LOC See flowsheet See flowsheet   HR Pulse (Heart Rate): 98 (03/11/23 1315) 99 Lungs     Resp Resp Rate: 20 (03/11/23 1315) 23 Cardiac     Temp Temp: 98.6 °F (37 °C) (03/11/23 1315)  Skin     Weight    Edema     Pain Pain Intensity 1: 0 (03/11/23 1200)  Tele Status       Orders   Duration: Start: 1315 End: 1615 Total: 3 hrs   Dialyzer: Dialyzer/Set Up Inspection: Revaclear (03/11/23 1315)   K Bath: Dialysate K (mEq/L): 2 (03/11/23 1315)   Ca Bath: Dialysate CA (mEq/L): 2.0 (03/11/23 1315)   Na: Dialysate NA (mEq/L): 140 (03/11/23 1315)   Bicarb: 30   Target Fluid Removal: Goal/Amount of Fluid to Remove (mL): 2500 mL (03/11/23 1315)     Access   Type & Location: LIJ non-tunneled CVC- newly placed and CXR verified   Comments: +aspiration/flush,  with acceptable AP/     Labs   HBsAg (Antigen) / date: Neg (02/18/23)                                      HBsAb (Antibody) / date: Susceptible (02/18/23)   Source: Epic   Obtained/Reviewed  Critical Results Called HGB   Date Value Ref Range Status   03/11/2023 7.2 (L) 12.1 - 17.0 g/dL Final     Potassium   Date Value Ref Range Status   03/11/2023 3.8 3.5 - 5.1 mmol/L Final     Calcium   Date Value Ref Range Status   03/11/2023 11.6 (H) 8.5 - 10.1 MG/DL Final     BUN   Date Value Ref Range Status   03/11/2023 69 (H) 6 - 20 MG/DL Final     Comment:     INVESTIGATED PER DELTA CHECK PROTOCOL     Creatinine   Date Value Ref Range Status   03/11/2023 2.51 (H) 0.70 - 1.30 MG/DL Final        Meds Given   Name Dose Route   Albumin 25% 25g IVD               Adequacy / Fluid    Total Liters Process: 64.8L   Net Fluid Removed: 2000 mL      Comments   Time Out Done: 1300   Admitting Diagnosis: Heart failure, TANNER on CKD   Consent obtained/signed: Informed Consent Verified: Yes (03/11/23 1315)   Machine / RO # Machine Number: [id number] (03/11/23 1315) Primary Nurse Rpt Pre: Adelfo Gonzalez RN   Primary Nurse Rpt Post: Adelfo Gonzalez RN   Pt Education: Procedural   Care Plan: HD today and TTS   Pts outpatient clinic: TBD     Tx Summary   Comments:                         1300: Safety checks complete, time out performed. 1315: CVC assessed, no redness, warmth or drainage noted. CHG dsg CDI. Each catheter limb and hub disinfected for 60 seconds with alcohol swabs per Hospital P&P. Aspirated and discarded 5ml from each lumen. +aspiration/flush. HD initiated. Access visible, lines secure. Medications reviewed. 1355: BPs trending down, UF goal reduced to 2000 mL. 1415: 80/40s, UF paused. Primary RN notified. Vasopressors initiated. 1615: HD complete. All possible blood rinsed back. Each catheter limb and hub disinfected for 60 seconds with alcohol swabs per Hospital P&P. Saline flushed, locked and capped. SBAR called to primary RN.

## 2023-03-11 NOTE — PROCEDURES
Procedure Note - Temporary Non-Tunneled HD Catheter (Hermelindo Catheter):   Performed by Tyler Singh MD  Diagnosis: TANNER  Insertion Date: 03/11/23  Time:10:13 AM   Obtained Consent? yes; informed   Procedure Location:  CVICU. Immediately prior to the procedure, the patient was reevaluated and found suitable for the planned procedure and any planned medications. Immediately prior to the procedure a time out was called to verify the correct patient, procedure, equipment, staff, and marking as appropriate. Central line Bundle:  Full sterile barrier precautions used. 7-Step Sterility Protocol followed. (cap, mask sterile gown, sterile gloves, large sterile sheet, hand hygiene, 2% chlorhexidine for cutaneous antisepsis)  5 mL 1% Lidocaine placed at insertion site. Patient positioned in Trendelenburg?yes   The site was prepped with ChloraPrep and Sterile draping. Catheter inserted into a new site. Using Seldinger technique a Hemodialysis Catheter was placed in the Left, Internal Jugular Vein via direct cannulation with 1 number of attempts for IV Access and Dialysis. Ultrasound Guidance was utilized. There was good dark, non-pulsatile blood return in all ports. Femoral Site? no.  Catheter secured. Biopatch/CHG bio-occlusive dressing in place? yes. The following complications were encountered: None. A follow-up chest x-ray was ordered post procedure. The procedure was tolerated well.       Tyler Singh MD   Critical Care Medicine  [facility]

## 2023-03-11 NOTE — PROGRESS NOTES
Name: Jem Dillard   MRN: [id number]  : 1950      Assessment:  TANNER on CKD-3a: Suspect cardiorenal effects initially. Now has LVAD and  POST OP ATN. Anuric->Requiring CRRT>>now iHD    hypercalcemia/immobilization- also has elevated PTH; may have component of 1ry vs secondary HPT; s/p Calcitonin x 4 doses for high Ca    Possible pancreatitis- with elevated lipase; Hypercalcemia can contribute  Ischemic CM: Recurrent exacerbations. EF 20%. S/p LVAD on 2/15. Required Impella RP for RV support-> attempts to wean not tolerated by patient. CVA  Sepsis  BPH   Well differentiated NET Grade 1: noted on EGD 23  Anemia 2 to CKD:  s/p PRBCS  Left UE basilic and radial vein thrombus  Thrombocytopenia     Was on CVVH. Transition to IHD on 3/6/2023. Calcium is improved. Got iUF yesterday for volume removal   His left IJ Hermelindo catheter was replaced today    Plan/Recommendations:  I HD today with UF of 2-2.5 kg as tolerated  Plan next HD on Monday after today  Can also use pressors if needed  Ct Sensipar- i dose increased on 3 9   Will hold off on IV Zometa or Pamidronate as last resort  BRIGHT    Discussed with RN, HD RN and Dr Yared henry    Subjective:  No events overnight.      ROS:   UTO    Exam:  Visit Vitals  /79 (BP 1 Location: Left upper arm, BP Patient Position: At rest)   Pulse (!) 104   Temp 98.4 °F (36.9 °C)   Resp 22   Ht 5' 6\" (1.676 m)   Wt 64.5 kg (142 lb 3.2 oz)   SpO2 100%   BMI 22.95 kg/m²     NAD  +icterus  +trach  LVAD hum +  Decreased breath sounds  Improved edema  + Jaundice    Current Facility-Administered Medications   Medication Dose Route Frequency Last Admin    0.9% sodium chloride infusion 250 mL  250 mL IntraVENous PRN      0.9% sodium chloride infusion 250 mL  250 mL IntraVENous PRN      oxyCODONE IR (ROXICODONE) tablet 5 mg  5 mg Oral Q4H PRN      oxyCODONE IR (ROXICODONE) tablet 10 mg  10 mg Oral Q4H PRN      pantoprazole (PROTONIX) 40 mg in 0.9% sodium chloride 10 mL injection  40 mg IntraVENous DAILY 40 mg at 03/11/23 7718    cinacalcet (SENSIPAR) tablet 60 mg  60 mg Oral DAILY WITH BREAKFAST 60 mg at 03/11/23 9752    insulin NPH (NOVOLIN N, HUMULIN N) injection 5 Units  5 Units SubCUTAneous Q12H 5 Units at 03/11/23 4683    labetaloL (NORMODYNE;TRANDATE) injection 5 mg  5 mg IntraVENous Q4H PRN 5 mg at 03/09/23 0307    [Held by provider] heparin 25,000 units in D5W 250 ml infusion  400 Units/hr IntraVENous CONTINUOUS Stopped at 03/10/23 1350    levETIRAcetam (KEPPRA) injection 250 mg  250 mg IntraVENous Once per day on Tue Thu Sat 250 mg at 03/09/23 2021    0.9% sodium chloride infusion  14 mL/hr IntraVENous CONTINUOUS 14 mL/hr at 03/09/23 0757    levETIRAcetam (KEPPRA) injection 500 mg  500 mg IntraVENous DAILY 500 mg at 03/11/23 5285    albumin human 25% (BUMINATE) solution 25 g  25 g IntraVENous DIALYSIS PRN 25 g at 03/10/23 1442    insulin lispro (HUMALOG) injection   SubCUTAneous Q6H 2 Units at 03/11/23 0535    hydrALAZINE (APRESOLINE) 20 mg/mL injection 5 mg  5 mg IntraVENous Q6H PRN 5 mg at 03/10/23 1316    hydrALAZINE (APRESOLINE) tablet 5 mg  5 mg Oral PRN      niCARdipine (CARDENE) 25 mg in 0.9% sodium chloride 250 mL ([id number])  0-15 mg/hr IntraVENous TITRATE Stopped at 03/05/23 1925    aspirin chewable tablet 81 mg  81 mg Per NG tube DAILY 81 mg at 03/11/23 0822    bicarbonate dialysis (PRISMASOL) BG K 4/Ca 2.5 5000 ml solution   Extracorporeal DIALYSIS CONTINUOUS New Bag at 03/05/23 1106    epoetin heidy-epbx (RETACRIT) injection 10,000 Units  10,000 Units SubCUTAneous Q TUE, THU & SAT 10,000 Units at 03/09/23 2022    EPINEPHrine (ADRENALIN) 10 mg in 0.9% sodium chloride 250 mL infusion  0-10 mcg/min IntraVENous TITRATE Stopped at 03/05/23 0920    propofol (DIPRIVAN) 10 mg/mL infusion  0-50 mcg/kg/min IntraVENous TITRATE Stopped at 03/02/23 1628    HYDROmorphone 30 mg/30 mL (1 mg/mL) infusion  0.5-1 mg/hr IntraVENous CONTINUOUS Stopped at 03/08/23 0516    dextrose (D50W) injection syrg 25 g  25 g IntraVENous PRN 9 mL at 02/25/23 1225    neomycin-polymyxin-dexamethasone (DEXACINE) 3.5 mg/g-10,000 unit/g-0.1 % ophthalmic ointment   Both Eyes BID Given at 03/11/23 0823    NOREPINephrine (LEVOPHED) 8 mg in 5% dextrose 250mL (32 mcg/mL) infusion  0.5-16 mcg/min IntraVENous TITRATE Stopped at 03/07/23 2100    colchicine tablet 0.6 mg  0.6 mg Oral DAILY 0.6 mg at 03/11/23 4805    heparin (porcine) 1,000 unit/mL injection 1,700 Units  1,700 Units InterCATHeter DIALYSIS PRN 1,700 Units at 03/09/23 1435    And    heparin (porcine) 1,000 unit/mL injection 1,500 Units  1,500 Units InterCATHeter DIALYSIS PRN 1,500 Units at 03/09/23 1434    balsam peru-castor oiL (VENELEX) ointment   Topical BID Given at 03/11/23 0900    acetaminophen (TYLENOL) solution 650 mg  650 mg Oral Q4H  mg at 02/20/23 0401    acetaminophen (TYLENOL) suppository 650 mg  650 mg Rectal Q4H  mg at 02/17/23 2013    HYDROmorphone (DILAUDID) injection 0.5 mg  0.5 mg IntraVENous Q3H PRN 0.5 mg at 03/09/23 1225    HYDROmorphone (DILAUDID) injection 1 mg  1 mg IntraVENous Q4H PRN 1 mg at 03/09/23 0557    [Held by provider] heparin 25,000 units in D5W 250 ml infusion  12-25 Units/kg/hr IntraVENous TITRATE Stopped at 03/08/23 0515    albumin human 25% (BUMINATE) solution 12.5 g  12.5 g IntraVENous Q6H 12.5 g at 03/11/23 1210    glucose chewable tablet 16 g  4 Tablet Oral PRN      glucagon (GLUCAGEN) injection 1 mg  1 mg IntraMUSCular PRN      sodium chloride (NS) flush 5-40 mL  5-40 mL IntraVENous Q8H 10 mL at 03/11/23 0536    sodium chloride (NS) flush 5-40 mL  5-40 mL IntraVENous PRN 40 mL at 02/17/23 1818    naloxone (NARCAN) injection 0.4 mg  0.4 mg IntraVENous PRN      ELECTROLYTE REPLACEMENT NOTE: Nurse to review Serum Potassium and Magnesuim levels and Initiate Electrolyte Replacement Protocol as needed  1 Each Other PRN      dextrose 10 % infusion 0-250 mL  0-250 mL IntraVENous PRN 75 mL at 02/24/23 0220    acetaminophen (TYLENOL) tablet 650 mg  650 mg Oral Q6H  mg at 02/17/23 0200    ondansetron (ZOFRAN) injection 4 mg  4 mg IntraVENous Q4H PRN 4 mg at 03/09/23 1212    albuterol-ipratropium (DUO-NEB) 2.5 MG-0.5 MG/3 ML  3 mL Nebulization Q6H PRN      senna-docusate (PERICOLACE) 8.6-50 mg per tablet 1 Tablet  1 Tablet Oral DAILY 1 Tablet at 03/11/23 0822    polyethylene glycol (MIRALAX) packet 17 g  17 g Oral DAILY 17 g at 03/03/23 0856    bisacodyL (DULCOLAX) suppository 10 mg  10 mg Rectal DAILY PRN 10 mg at 02/22/23 0839    0.45% sodium chloride infusion  10 mL/hr IntraVENous CONTINUOUS Stopped at 03/10/23 1707    DOBUTamine (DOBUTREX) 500 mg/250 mL (2,000 mcg/mL) infusion  0-10 mcg/kg/min IntraVENous TITRATE Stopped at 02/23/23 1044    dexmedeTOMidine in 0.9 % NaCl (PRECEDEX) 400 mcg/100 mL (4 mcg/mL) infusion soln  0.1-1.5 mcg/kg/hr IntraVENous TITRATE Stopped at 03/08/23 2300       Labs/Data:    Lab Results   Component Value Date/Time    WBC 8.1 03/11/2023 04:32 AM    HGB 7.2 (L) 03/11/2023 04:32 AM    HCT 23.4 (L) 03/11/2023 04:32 AM    PLATELET 166 21/81/4291 04:32 AM    .5 (H) 03/11/2023 04:32 AM       Lab Results   Component Value Date/Time    Sodium 139 03/11/2023 04:32 AM    Potassium 3.8 03/11/2023 04:32 AM    Chloride 106 03/11/2023 04:32 AM    CO2 18 (L) 03/11/2023 04:32 AM    Anion gap 15 03/11/2023 04:32 AM    Glucose 215 (H) 03/11/2023 04:32 AM    BUN 69 (H) 03/11/2023 04:32 AM    Creatinine 2.51 (H) 03/11/2023 04:32 AM    BUN/Creatinine ratio 27 (H) 03/11/2023 04:32 AM    GFR est AA 46 (L) 06/23/2022 09:40 AM    GFR est non-AA 38 (L) 06/23/2022 09:40 AM    eGFR 27 (L) 03/11/2023 04:32 AM    eGFR 69 01/25/2023 11:55 AM    Calcium 11.6 (H) 03/11/2023 04:32 AM       Wt Readings from Last 3 Encounters:   03/11/23 64.5 kg (142 lb 3.2 oz)   01/25/23 55.8 kg (123 lb)   01/23/23 54.9 kg (121 lb)         Intake/Output Summary (Last 24 hours) at 3/11/2023 1229  Last data filed at 3/11/2023 1200  Gross per 24 hour   Intake 1576.42 ml   Output 2365 ml   Net -788.58 ml         Patient seen and examined. Chart reviewed. Labs, data and other pertinent notes reviewed in last 24 hrs.     PMH/SH/FH reviewed and unchanged compared to H&P      Aristides Lloyd MD

## 2023-03-11 NOTE — PROGRESS NOTES
2000- Bedside and Verbal shift change report given to Nan Campos (oncoming nurse) by Alfonso (offgoing nurse). Report included the following information SBAR, Intake/Output, MAR, Recent Results, Cardiac Rhythm SR, and Alarm Parameters . 2100- Patient grimaced with Insulin injection, no other interactions noted. 0430- Patient bathed, labs drawn; patient tolerated well.    0800- Bedside and Verbal shift change report given to Lilia Miller (oncoming nurse) by Nan Campos (offgoing nurse). Report included the following information SBAR, Procedure Summary, Intake/Output, MAR, Recent Results, Cardiac Rhythm ST, and Alarm Parameters .

## 2023-03-11 NOTE — PROGRESS NOTES
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 Marietta Conte  C/b RV failure requiring Impella RP placed 23 and discontinued 3/1/23  C/b acute on chronic renal failure, requiring CRRT  C/b hepatic failure, TB 12 (peak 15.3)  C/b lactic acidosis, persistent  C/b persistent septic shock c/b vasodilation and high outputs, on multiple antibiotics, procalcitonin persistently elevated  C/b R SUPA occlusion with small area of matched perfusion defect - subacute infarct c/b left sided hemiparesis  C/b pancreatitis  C/b failure to extubate x 2; anticipating tracheostomy today  Patient was approved for LVAD implantation as destination therapy at [facility] 2/3/23; the following have been identified and d/w patient as relative concerns pertaining to LVAD candidacy: small body surface area, cachexia, BMI 18, malnutrition, frailty, advanced age, muscular deconditioning, PVD (fingertips), urinary retention requiring donnelly catheter, neuroendocrine tumor class 1 requiring treatment after LVAD, mild neurocognitive dysfunction, chronic kidney disease and hearing loss requiring hearing aid  Family meeting weekly          RECOMMENDATIONS:  Continue LVAD at 4600rpm- repeat LVIDd 4.6cm  Repeat Head CT today off anticoagulation   Repeat TTE on Monday   No beta-blockers due to hypotension and RV conditioning prior to LVAD  Cannot tolerate ARB/ARNi due to hypotension and upcoming surgical procedure   Cannot tolerate spironolactone due to hyperkalemia  Cannot tolerate jardiance due to significant diuresis on smallest dose/contributed to IVVD  Previously discontinued corlanor due to SVT  Digoxin stopped d/t risk for toxicity with amio loading  Discontinued amio due to intermitted heart blocks under pacemaker  HD per nephrology T/TR/SAT ; goal CVP 10-12  UF on alternative days   Keep K+ >4 and Mg >2   ID recommendations appreciated- pan culture NGTD  Continue colchicine 0.6mg daily for possible pericarditis  Hold ASA d/t CVA   Continue to hold coumadin  Heparin gtt on hold for now due to slightly progressive on SAH on head CT  Hepatology recommendations appreciated   Cannot tolerate statins due to abnormal LFT  Management of tube feeds in light of pancreatitis per intensivist  Continue bowel regimen   Daily dressing changes to Drive line exit site  Pulmonary hygiene- continue SBT   VAD education with caregivers/patient when able by VAD coordinator  D/w  bedside staff      INTERVAL HISTORY:  Afebrile   MAPs 80s, HR 90-100s  CVP 13-18  I/O net negative 1.1Lml  Hgb down to 7.2  Ca 11.6  Cr 2.5 on HD  TB up to 17.7  LFTs unchanged   PBNP up to [address]  Lactic  2.3- trending down   Still oozing from trach site   Head CT- persistent SAH  HD cath replaced        IMPRESSION:  Acute on chronic combined systolic/diastolic  heart failure  Stage D, NYHA class IV symptoms   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, LVEF 20% (by echo 1/2023) and 23% (by cMRI 1/3/23)  Cardiac MRI suggestive of ischemic cardiomyopathy  PYP equivocal  S/p HeartMate 3  LVAD-DT (2/15/23)  C/b RV failure requiring Impella RP placed 2/18/23 and discontinued 3/1/23  C/b acute on chronic renal failure, requiring CRRT  C/b hepatic failure, TB 12 (peak 15.3)  C/b lactic acidosis, persistent  C/b persistent septic shock c/b vasodilation and high outputs, on multiple antibiotics, procalcitonin persistently elevated  C/b R SUPA occlusion with small area of matched perfusion defect - subacute infarct c/b left sided hemiparesis  C/b pancreatitis  C/b failure to extubate x 2; anticipating tracheostomy this week  Coronary artery disease  CAD s/p CABG x 2: further disease best managed medically due to small vessel size   At risk of sudden cardiac death  Peripheral arterial disease  Bilateral hydronephrosis s/p andrzej  Cardiac risk factors:  HTN  HL  TRISTAN, STOP-BANG 4  DM2  CKD, stage 3  MOCA from 2/9 19/30, consistent with mild cognitive impairment  Hard of hearing  Gastric Neuroendocrine Tumor, elevated gastrin and chromogranin A  Septic shock, improving   Left sided weakness noted night 3/1. +SAH and subacute occlusion distal R cerebral artery         LIFE GOALS: not readdressed today   Patient's personal goals included: having a few more years with family  Important upcoming milestones or family events: None at this time   The patient identified the following as important for living well: being at home, not being SOB            CXR (3/5/23) mild pulmonary edema. Small pleural effusions and bibasilar airspace opacities. Head CTA (3/2/23) Occlusion distal right anterior cerebral artery, with associated small area of matched perfusion defect and cortical enhancement, consistent with subacute infarct. Diminutive and irregular right vertebral artery, occluded at the V3-4 junction, age indeterminate extensive multifocal atherosclerosis in the intracranial and extracranial circulation. CARDIAC IMAGING:   ECHO- 3/6/23       Left Ventricle: Severely reduced left ventricular systolic function with a visually estimated EF of 15 - 20%. Left ventricle is moderately dilated. LVAD is present. Right Ventricle: Right ventricle is moderately dilated. Mildly reduced systolic function. Echo 2/19/23    Left Ventricle: Severely reduced left ventricular systolic function with a visually estimated EF of 20 - 25%. Not well visualized. Left ventricle size is normal. Increased wall thickness. Unable to assess wall motion. Abnormal diastolic function. LVAD is present.  LVAD inflow cannula was not well visualized. Right Ventricle: Not well visualized. Right ventricle is mildly dilated. Moderately reduced systolic function. TAPSE is abnormal.    Mitral Valve: Severe annular calcification at the anterior and posterior leaflets of the mitral valve. Mild regurgitation. Left Atrium: Left atrium is dilated. Right Atrium: Right atrium is dilated. Technical qualifiers: Echo study was technically difficult and technically difficult with poor endocardial visualization. Echo 1/27/23    Left Ventricle: EF by visual approximation is 20%. Left ventricle is mildly dilated. Mitral Valve: Moderately thickened leaflet, at the anterior and posterior leaflets. Moderately calcified leaflet. Moderate regurgitation. Left Atrium: Left atrium is moderately dilated. Technical qualifiers: Echo study was technically difficult with poor endocardial visualization. Contrast used: Definity. Limited study, not all structures viewed     Echo 1/9/23   Left Ventricle: Severely reduced left ventricular systolic function with a visually estimated EF of 25 - 30%. Left ventricle size is normal. Mildly increased wall thickness. Echo 12/26/22    Left Ventricle: Severely reduced left ventricular systolic function with a visually estimated EF of 25 - 30%. Left ventricle size is normal. Normal wall thickness. There are regional wall motion abnormalities. Grade II diastolic dysfunction with increased LAP. Right Ventricle: Moderately reduced systolic function. TAPSE is abnormal. TAPSE is 1.1 cm. Aortic Valve: Mild stenosis of the aortic valve. AV peak gradient is 13 mmHg. AV peak velocity is 1.8 m/s. Mitral Valve: Not well visualized. Moderate annular calcification at the posterior leaflet of the mitral valve. Mild to moderate regurgitation. Tricuspid Valve: Mildly elevated RVSP. Left Atrium: Left atrium is moderately dilated. 12/8/22    Left Ventricle:  Moderately reduced left ventricular systolic function with a visually estimated EF of 35 - 40%. Severe hypokinesis of the following segments: mid anteroseptal, apical anterior, apical septal, apical inferior and apical lateral. Severe hypokinesis of the apex. Mitral Valve: Severely thickened leaflet, at the anterior and posterior leaflets. Severely calcified leaflet, at the anterior and posterior leaflets. Mild annular calcification of the mitral valve. Moderate regurgitation. Left Atrium: Left atrium is mildly dilated. Contrast used: Definity. limited study     EKG 12/22/22 ST, Biatria enlargement, marked ST abnormality     [facility] 12/6/22  1. Normal LVEDP  2. Severe native multivessel coronary artery disease  3. Patent LIMA to LAD and vein graft to distal RCA  4. Recurrent ISR in OM1 stent with now 60 to 70% restenosis  5. Recoil of left main and circumflex stent with now recurrent 40 to 50% stenosis. 6.  Progression of ostial left main disease now to about 60% stenosis  7. Progression of disease in jailed first marginal branch now with diffuse 90% stenosis  8.   High-grade stenosis in the mid to distal right potential femoral artery treated with 6 x 40 mm impact drug-coated balloon angioplasty to reduce the stenosis to less than 40%        HEMODYNAMICS:  RHC 1/9/23  PA 20/9, RA 3, PCWP 8, CI 1.8     CPEST too ill   6MW 300 feet    LVAD INTERROGATION:  Device interrogated in person  Device function normal, normal flow, no events  LVAD   Pump Speed (RPM): 4600  Pump Flow (LPM): 3.4  MAP: 80  PI (Pulsitility Index): 4.3  Power: 2.9   Test: No  Back Up  at Bedside & Labeled: Yes  Power Module Test: No  Driveline Site Care: No  Driveline Dressing: Clean, Dry, and Intact  Testing  Alarms Reviewed: Yes  Back up SC speed: 4600  Back up Low Speed Limit: 4200  Emergency Equipment with Patient?: Yes  Emergency procedures reviewed?: No  Drive line site inspected?: Yes  Drive line intergrity inspected?: Yes  Drive line dressing changed?: No    PHYSICAL EXAM:  Visit Vitals  /79 (BP 1 Location: Left upper arm, BP Patient Position: At rest)   Pulse (!) 102   Temp 98.3 °F (36.8 °C)   Resp 27   Ht 5' 6\" (1.676 m)   Wt 142 lb 3.2 oz (64.5 kg)   SpO2 98%   BMI 22.95 kg/m²     Hemodynamics:   CO: CO (l/min): 4.8 l/min   CI: CI (l/min/m2): 2.7 l/min/m2   CVP: CVP (mmHg): 15 mmHg (03/11/23 0700)   SVR: SVR (dyne*sec)/cm5: 1106 (dyne*sec)/cm5 (03/10/23 0413)   PAP Systolic: PAP Systolic: 44 (50/73/11 7726)   PAP Diastolic: PAP Diastolic: 23 (12/74/83 9441)   PVR:     SV02: SVO2 (%): 72 % (03/10/23 0900)   SCV02:        Physical Assessment:   Physical Exam  Vitals and nursing note reviewed. Constitutional:       Appearance: He is ill-appearing. Cardiovascular:      Rate and Rhythm: Regular rhythm. Tachycardia present. Heart sounds: Normal heart sounds. No murmur heard. Comments: + VAD hum  Pulmonary:      Breath sounds: Rhonchi present. Comments: intubated  Abdominal:      General: There is distension. Palpations: Abdomen is soft. Musculoskeletal:         General: No swelling. Skin:     General: Skin is warm and dry. Coloration: Skin is jaundiced. Neurological:      Mental Status: He is alert.       Comments: sedated              REVIEW OF SYSTEMS:  Review of Systems   Unable to perform ROS: Acuity of condition      PAST MEDICAL HISTORY:  Past Medical History:   Diagnosis Date    CAD (coronary artery disease) 11/10/2016    NSTEMI & 2 stents    Deafness 10/28/2012    DM (diabetes mellitus) ([facility] [address]     Elevated cholesterol     Hypertension     NSTEMI (non-ST elevated myocardial infarction) ([facility] [address] 11/10/2016       PAST SURGICAL HISTORY:  Past Surgical History:   Procedure Laterality Date    COLONOSCOPY N/A 6/28/2018    COLONOSCOPY performed by Sanjuana Spicer MD at [address] [place] [address] N/A 1/18/2023    COLONOSCOPY performed by Marcy Maloney MD at [facility] ENDOSCOPY    [facility] PROCEDURE CARDIAC SURGERY  11/11/2016    2 stents       FAMILY HISTORY:  Family History   Problem Relation Age of Onset    Heart Disease Father     Heart Attack Father     Hypertension Mother     Elevated Lipids Brother     Elevated Lipids Brother     No Known Problems Sister     Elevated Lipids Brother     No Known Problems Son     No Known Problems Daughter     Anesth Problems Neg Hx        SOCIAL HISTORY:  Social History     Socioeconomic History    Marital status:    Tobacco Use    Smoking status: Never     Passive exposure: Never    Smokeless tobacco: Never   Vaping Use    Vaping Use: Never used   Substance and Sexual Activity    Alcohol use: Yes     Alcohol/week: 2.0 standard drinks     Types: 1 Cans of beer, 1 Shots of liquor per week     Comment: rarely    Drug use: No    Sexual activity: Yes     Social Determinants of Health     Financial Resource Strain: Medium Risk    Difficulty of Paying Living Expenses: Somewhat hard   Food Insecurity: Food Insecurity Present    Worried About Running Out of Food in the Last Year: Never true    Ran Out of Food in the Last Year: Often true       LABORATORY RESULTS:     Labs Latest Ref Rng & Units 3/11/2023 3/10/2023 3/9/2023 3/8/2023 3/8/2023 3/8/2023 3/7/2023   WBC 4.1 - 11.1 K/uL 8.1 11. 4(H) 11.0 - 7.6 - 7.4   RBC 4.10 - 5.70 M/uL 2.26(L) 2.35(L) 2.53(L) - 2.54(L) - 2.71(L)   Hemoglobin 12.1 - 17.0 g/dL 7. 2(L) 7. 6(L) 7. 9(L) 8.0(L) 7. 9(L) - 8. 6(L)   Hematocrit 36.6 - 50.3 % 23. 4(L) 23. 5(L) 25. 7(L) 25. 5(L) 25. 6(L) - 27. 1(L)   MCV 80.0 - 99.0 . 5(H) 100. 0(H) 101. 6(H) - 100. 8(H) - 100. 0(H)   Platelets 453 - 220 K/uL 261 337 358 - 254 - 255   Lymphocytes 12 - 49 % 7(L) 6(L) 11(L) - - - -   Monocytes 5 - 13 % 12 13 12 - - - -   Eosinophils 0 - 7 % 2 0 0 - - - -   Basophils 0 - 1 % 0 0 1 - - - -   Albumin 3.5 - 5.0 g/dL 3.9 4.0 4.0 - - 4.1 3.6   Calcium 8.5 - 10.1 MG/DL 11. 6(H) 11. 6(H) 11. 9(H) - - 10. 6(H) 10. 9(H)   Glucose 65 - 100 mg/dL 215(H) 226(H) 126(H) - - 223(H) 165(H)   BUN 6 - 20 MG/DL 69(H) 44(H) 56(H) - - 30(H) 30(H)   Creatinine 0.70 - 1.30 MG/DL 2.51(H) 2.88(H) 3.53(H) - - 2.33(H) 2.32(H)   Sodium 136 - 145 mmol/L 139 137 137 - - 137 136   Potassium 3.5 - 5.1 mmol/L 3.8 4.1 5.0 - - 4.3 4.5   TSH 0.36 - 3.74 uIU/mL - - - - - - -   PSA 0.01 - 4.0 ng/mL - - - - - - -   LDH 85 - 241 U/L 291(H) 342(H) 357(H) - - 262(H) 256(H)   Some recent data might be hidden     Lab Results   Component Value Date/Time    TSH 3.52 01/28/2023 05:26 AM    TSH 2.12 12/27/2022 02:36 PM    TSH 4.80 (H) 12/06/2022 03:53 AM    TSH 5.39 (H) 10/12/2022 09:10 AM    TSH 3.53 02/03/2022 11:47 AM    TSH 5.790 (H) 11/21/2019 04:45 PM    TSH 3.08 06/22/2018 01:53 PM    TSH 4.250 05/26/2015 09:43 AM       ALLERGY:  Allergies   Allergen Reactions    Ambien [Zolpidem] Other (comments)     Causes extreme confusion        CURRENT MEDICATIONS:    Current Facility-Administered Medications:     0.9% sodium chloride infusion 250 mL, 250 mL, IntraVENous, PRN, Shaila, Erin B, NP    oxyCODONE IR (ROXICODONE) tablet 5 mg, 5 mg, Oral, Q4H PRN, Doreatha Ache, David G, DO    oxyCODONE IR (ROXICODONE) tablet 10 mg, 10 mg, Oral, Q4H PRN, Doreatha Ache, David G, DO    pantoprazole (PROTONIX) 40 mg in 0.9% sodium chloride 10 mL injection, 40 mg, IntraVENous, DAILY, Walker Moreno MD, 40 mg at 03/10/23 0931    cinacalcet (SENSIPAR) tablet 60 mg, 60 mg, Oral, DAILY WITH BREAKFAST, Yoselin Olvera MD, 60 mg at 03/10/23 0931    insulin NPH (NOVOLIN N, HUMULIN N) injection 5 Units, 5 Units, SubCUTAneous, Q12H, Cathy Sheldon, CNS, 5 Units at 03/10/23 2109    [COMPLETED] cefepime (MAXIPIME) 2 g in 0.9% sodium chloride 10 mL IV syringe, 2 g, IntraVENous, NOW, 2 g at 03/09/23 1956 **FOLLOWED BY** cefepime (MAXIPIME) 0.5 g in 0.9% sodium chloride 100 mL IVPB, 500 mg, IntraVENous, Q24H, Walker Aponte MD, Last Rate: 25 mL/hr at 03/10/23 2037, 0.5 g at 03/10/23 2037    labetaloL (NORMODYNE;TRANDATE) injection 5 mg, 5 mg, IntraVENous, Q4H PRN, Floyd MOLINA MD, 5 mg at 03/09/23 0307    [Held by provider] heparin 25,000 units in D5W 250 ml infusion, 400 Units/hr, IntraVENous, CONTINUOUS, Liztete Linton NP, Stopped at 03/10/23 1350    levETIRAcetam (KEPPRA) injection 250 mg, 250 mg, IntraVENous, Once per day on Tue Thu Sat, Walker Campos MD, 250 mg at 03/09/23 2021    0.9% sodium chloride infusion, 14 mL/hr, IntraVENous, CONTINUOUS, Floyd MOLINA MD, Last Rate: 14 mL/hr at 03/09/23 0757, 14 mL/hr at 03/09/23 0757    levETIRAcetam (KEPPRA) injection 500 mg, 500 mg, IntraVENous, DAILY, Walker Aponte MD, 500 mg at 03/10/23 0930    albumin human 25% (BUMINATE) solution 25 g, 25 g, IntraVENous, DIALYSIS PRN, Tabitha Chery MD, 25 g at 03/10/23 1442    insulin lispro (HUMALOG) injection, , SubCUTAneous, Q6H, Floyd MOLINA MD, 2 Units at 03/11/23 0535    hydrALAZINE (APRESOLINE) 20 mg/mL injection 5 mg, 5 mg, IntraVENous, Q6H PRN, Malinda Mchugh MD, 5 mg at 03/10/23 1316    hydrALAZINE (APRESOLINE) tablet 5 mg, 5 mg, Oral, PRN, Malinda Mchugh MD    niCARdipine (CARDENE) 25 mg in 0.9% sodium chloride 250 mL ([id number]), 0-15 mg/hr, IntraVENous, TITRATE, Seamus Perez DO, Stopped at 03/05/23 1925    aspirin chewable tablet 81 mg, 81 mg, Per NG tube, DAILY, Sydni Mayorga MD, 81 mg at 03/10/23 0931    bicarbonate dialysis (PRISMASOL) BG K 4/Ca 2.5 5000 ml solution, , Extracorporeal, DIALYSIS CONTINUOUS, Julius Schultz MD, Last Rate: 1,750 mL/hr at 03/05/23 1106, New Bag at 03/05/23 1106    epoetin heidy-epbx (RETACRIT) injection 10,000 Units, 10,000 Units, SubCUTAneous, Q TUE, THU & SAT, Murrayu-SandeeiJulius MD, 10,000 Units at 03/09/23 2022    EPINEPHrine (ADRENALIN) 10 mg in 0.9% sodium chloride 250 mL infusion, 0-10 mcg/min, IntraVENous, TITRATE, Karol Johnson MD, Stopped at 03/05/23 0920    propofol (DIPRIVAN) 10 mg/mL infusion, 0-50 mcg/kg/min, IntraVENous, TITRATE, Cliff Diana MD, Stopped at 03/02/23 1628    HYDROmorphone 30 mg/30 mL (1 mg/mL) infusion, 0.5-1 mg/hr, IntraVENous, CONTINUOUS, Amrik Johnson MD, Stopped at 03/08/23 0516    dextrose (D50W) injection syrg 25 g, 25 g, IntraVENous, PRN, Clara Carcamo MD, 9 mL at 02/25/23 1225    neomycin-polymyxin-dexamethasone (Vernadine Sidle) 3.5 mg/g-10,000 unit/g-0.1 % ophthalmic ointment, , Both Eyes, BID, Leodan Pérez MD, Given at 03/10/23 1751    NOREPINephrine (LEVOPHED) 8 mg in 5% dextrose 250mL (32 mcg/mL) infusion, 0.5-16 mcg/min, IntraVENous, TITRATE, Amrik Johnson MD, Stopped at 03/07/23 2100    colchicine tablet 0.6 mg, 0.6 mg, Oral, DAILY, Lizette Linton, NP, 0.6 mg at 03/10/23 0931    heparin (porcine) 1,000 unit/mL injection 1,700 Units, 1,700 Units, InterCATHeter, DIALYSIS PRN, 1,700 Units at 03/09/23 1435 **AND** heparin (porcine) 1,000 unit/mL injection 1,500 Units, 1,500 Units, InterCATHeter, DIALYSIS PRN, Arjericaa Lat, DO, 1,500 Units at 03/09/23 1434    balsam peru-castor oiL (VENELEX) ointment, , Topical, BID, Cliff Diana MD, Given at 03/10/23 0932    acetaminophen (TYLENOL) solution 650 mg, 650 mg, Oral, Q4H PRN, Clara Carcamo MD, 650 mg at 02/20/23 0401    acetaminophen (TYLENOL) suppository 650 mg, 650 mg, Rectal, Q4H PRN, Clara Carcamo MD, 650 mg at 02/17/23 2013    HYDROmorphone (DILAUDID) injection 0.5 mg, 0.5 mg, IntraVENous, Q3H PRN, Ramos MOLINA MD, 0.5 mg at 03/09/23 1225    HYDROmorphone (DILAUDID) injection 1 mg, 1 mg, IntraVENous, Q4H PRN, Ramos MOLINA MD, 1 mg at 03/09/23 0557    [Held by provider] heparin 25,000 units in D5W 250 ml infusion, 12-25 Units/kg/hr, IntraVENous, TITRATE, Maggie Mueller MD, Stopped at 03/08/23 0515    albumin human 25% (BUMINATE) solution 12.5 g, 12.5 g, IntraVENous, Q6H, Maggie Mueller MD, Last Rate: 60 mL/hr at 02/27/23 0020, 12.5 g at 03/11/23 0535    glucose chewable tablet 16 g, 4 Tablet, Oral, PRN, Alfonzo Garcia, NP    glucagon (GLUCAGEN) injection 1 mg, 1 mg, IntraMUSCular, PRN, Shaila, Lizette B, NP    sodium chloride (NS) flush 5-40 mL, 5-40 mL, IntraVENous, Q8H, Shaila, Lizette B, NP, 10 mL at 03/11/23 0536    sodium chloride (NS) flush 5-40 mL, 5-40 mL, IntraVENous, PRN, Shaila, Lizette B, NP, 40 mL at 02/17/23 1818    naloxone (NARCAN) injection 0.4 mg, 0.4 mg, IntraVENous, PRN, Shaila, Lizette B, NP    ELECTROLYTE REPLACEMENT NOTE: Nurse to review Serum Potassium and Magnesuim levels and Initiate Electrolyte Replacement Protocol as needed, 1 Each, Other, PRN, Shaila, Lizette B, NP    dextrose 10 % infusion 0-250 mL, 0-250 mL, IntraVENous, PRN, Shaila, Lizette B, NP, Last Rate: 150 mL/hr at 02/24/23 0220, 75 mL at 02/24/23 0220    acetaminophen (TYLENOL) tablet 650 mg, 650 mg, Oral, Q6H PRN, Shaila, Lizette B, NP, 650 mg at 02/17/23 0200    ondansetron (ZOFRAN) injection 4 mg, 4 mg, IntraVENous, Q4H PRN, Shaila, Lizette B, NP, 4 mg at 03/09/23 1212    albuterol-ipratropium (DUO-NEB) 2.5 MG-0.5 MG/3 ML, 3 mL, Nebulization, Q6H PRN, Shaila, Lizette B, NP    senna-docusate (PERICOLACE) 8.6-50 mg per tablet 1 Tablet, 1 Tablet, Oral, DAILY, Shaila, Lizette B, NP, 1 Tablet at 03/03/23 0856    polyethylene glycol (MIRALAX) packet 17 g, 17 g, Oral, DAILY, Shaila, Lizette B, NP, 17 g at 03/03/23 0856    bisacodyL (DULCOLAX) suppository 10 mg, 10 mg, Rectal, DAILY PRN, Shaila, Lizette B, NP, 10 mg at 02/22/23 0839    0.45% sodium chloride infusion, 10 mL/hr, IntraVENous, CONTINUOUS, Maggie Mueller MD, Stopped at 03/10/23 1707    DOBUTamine (DOBUTREX) 500 mg/250 mL (2,000 mcg/mL) infusion, 0-10 mcg/kg/min, IntraVENous, TITRATE, Amrik Johnson MD, Stopped at 02/23/23 1044    dexmedeTOMidine in 0.9 % NaCl (PRECEDEX) 400 mcg/100 mL (4 mcg/mL) infusion soln, 0.1-1.5 mcg/kg/hr, IntraVENous, TITRATE, Amrik Johnson MD, Stopped at 03/08/23 2300    PATIENT CARE TEAM:  Patient Care Team:  Justice Garcia Fanny Maldonado MD as PCP - General (Family Medicine)  Griffin Saab MD as PCP - [facility]  Laurie Newby MD (Cardiovascular Disease Physician)  Tru Baptiste MD (Gastroenterology)  Yanira Michael MD (Cardiothoracic Surgery)  Donn Zaldivar MD (Cardiovascular Disease Physician)  Kaur Pro MD (Nephrology)  Devang Archibald MD (Pulmonary Disease)  Nancy Stovall MD ([facility]     Thank you for allowing me to participate in this patient's care. Xin Younger NP   Advanced Heart Failure [address]  [address] [facility], [address]  Phone: [phone]    Total Critical Care time spent:    45minutes. There was no overlap with other services        Critical care was necessary to treat or prevent imminent or life threatening deterioration of the following conditions: cardiac failure, respiratory failure and CNS failure or compromise     Services Provided:  1. Telemetry review and 12 lead ECG interpretation  2. Hemodynamic interpretation, assessment, and management  3. Review and interpretation of CXR  4. Review and interpretation of lab values  5. Review and interpretation of microbiologic data and culture results  6. Review of medications and administration  7. Review and interpretation of nutrition requirements and management  8. Discussion of management withother consultants and services  9.  Clinical update to family members

## 2023-03-11 NOTE — PROGRESS NOTES
CRITICAL CARE NOTE      Name: Tobias Campos   : 1950   MRN: [id number]   Date: 3/11/2023      Reason for ICU Admission: Acute on chronic HFrEF     ICU PROBLEM LIST   Acute on chronic HFrEF (20%) NYHA IIIb/IV (D) on home inotropic support, life vest s/p LVAD  TANNER on CKD3, on CRRT  CHB post op, resolved  Aflutter w/ RVR  Acute on chronic hypoxemic respiratory failure, s/p trach placement  CAP  CAD  Gastric neuroendocrine tumor  Hx of LUE DVT  DM  PAD  TRISTAN  BPH w/ urinary retention requiring chronic donnelly    HISTORY OF PRESENT ILLNESS:   Pt is a 71 y.o M w/ PMH as noted above admitted to [facility] on  due to ongoing symptoms secondary to his HFrEF. Treated for possible CAP, and diuresed for acute on chronic HFrEF. Nephrology following for TANNER on CKD 3. AHF team recommended transfer to CVICU for [facility] placement and ongoing LVAD workup, with placement 2/15. Pt extubated , however with development of worsening renal dysfunction overnight and unable to tolerate CRRT so was reintubated and taken for Impella RP placement for right-sided support in a.m. of  and CRRT resumed. Impella removed on 3/1. Overnight 3/1-3/1 CVA noted with SAH.  3/2 SAH enlarged. Held anticoagulation. Failed extubation 3/2. Trach placed 3/7. Transitioned to [facility]. Repeat CTH 3/10 w/ persistent SAH, heparin held. 24 HOUR EVENTS:   Pt remains somnolent, however more responsive to stimulus. [facility] HD line removed yesterday evening and re-placed this AM. Plan for HD today. Will give 1u pRBC today (no transfusion given yesterday).        NEUROLOGICAL:    PRN anxiolytics, pain regimen  Avoid sedation now post trach  CT persistent SAH, will repeat imaging today, and if stable will repeat for surveillance in 72hrs, remains off heparin  Delirium precautions    PULMONOLOGY:   Lung protective ventilation, on minimal vent support at this time  S/p Trach placement   Continue with PSV trials  SLP consulted for in-line PMV trial, however remains tachypneic this a.m. Monitor blood gas  Monitor bleeding at trach site  PT OT, sit up in bed with goal out of bed to chair as tolerated    CARDIOVASCULAR:   MAP goal >65 <85, PRN vasopressors, antihypertensives  Repeat ECHO 3/10 Right ventricle is moderately dilated. Mildly reduced systolic function LVID and TAPSE improved. LVAD  3L @ 4600 RPM  CVP goal 10-12  C/w ASA, statin  Goal euvolemia  Unable to tolerate BB, ARNI/ARB due to hypotension, aldactone held 2/2 elevated K     GASTROINTESTINAL:   NPO, on tube feeds  PPI   Trend LFTs  Tbili uptrend this a.m., Hepatology consulted, hepatic dysfunction likely 2/2 low flow and venous stasis, cirrhosis unlikely at this time  ? Pancreatitis, possibly 2/2 hyper Ca vs low flow state, c/w sensipar however does not appear to be resolving w/ improvement in Ca  US unable to assess pancreas due to intestinal air, evidence of chronic hepatic congestion    RENAL/ELECTROLYTE/FLUIDS:   Strict I/Os,  goal  euvolemia  HD per nephrology   Monitor for renal recovery  Monitor RFP  Mg/Phos/K >2/3/4  Vasquez to remain in place    ENDOCRINE:    SSI, Basal insulin   Hypoglycemic protocol  Glucose goal 120-180    HEMATOLOGY/ONCOLOGY:   Continue with heparin  Hemoglobin goal greater than 7, platelet goal greater than 50  EPO weekly  Gastric neuroendocrine tumor, well differentiated, good prognosis per onc.      ID/MICRO:   Stop cefepime as Ucx/Bcx NGTD, as cefepime can be neurotoxic  Initial Bcx without growth at this time  Urine with Candida s/p fluconazole    ICU DAILY CHECKLIST     Code Status:Full  DVT Prophylaxis:heparin  T/L/D: trach, CVC,  PIV,  Vasquez,  V  wire, LVAD drive line  SUP: PPI  Diet: NPO, TF  Activity Level:ad jose f  ABCDEF Bundle/Checklist Completed:Yes  Disposition: Stay in ICU  Multidisciplinary Rounds Completed:  yes  Patient/Family Updated: Yes    Elliotestiventaya   2/3 Transferred to CVICU for [facility] placement  2/7 started on dobutamine as adjunct to milrinone  2/15 LVAD implant   extubated   Impella RP placement  3/1 Impella removed  3/1-2 SAH on CT  3/3 RE-intubated  3/6 Blakes removed, transitioned to HD  3/7 Trach placment  3/10 persistent SAH, heparin stopped  3/11 HD catheter replaced    SUBJECTIVE:     As noted above    Review of Systems:     Unable to perform due to patient intubated    OBJECTIVE:     Labs and Data: Reviewed 23  Medications: Reviewed 23  Imaging: Reviewed 23    General - somnolent, chronically ill appearing  HEENT- pupils equal, nystagmus, anicteric  Neuro - sedated, no focal deficit  CV - Paced,  VAD hum, post sternotomy  Resp - trach, coarse b/l  Abd - soft, distended, nontender, no guarding  MSK- intact, no sacral ulcer, anasarca  LVAD driveline in left chest    Visit Vitals  /79 (BP 1 Location: Left upper arm, BP Patient Position: At rest)   Pulse (!) 106   Temp (!) 33.8 °F (1 °C)   Resp 25   Ht 5' 6\" (1.676 m)   Wt 64.5 kg (142 lb 3.2 oz)   SpO2 99%   BMI 22.95 kg/m²    O2 Flow Rate (L/min): 20 l/min O2 Device: Tracheostomy, Ventilator Temp (24hrs), Av.7 °F (33.2 °C), Min:33.8 °F (1 °C), Max:98.9 °F (37.2 °C)    CVP (mmHg): 15 mmHg (23 1000)      Intake/Output:     Intake/Output Summary (Last 24 hours) at 3/11/2023 1015  Last data filed at 3/11/2023 1000  Gross per 24 hour   Intake 1079.5 ml   Output 2365 ml   Net -1285.5 ml       Imaging    23    ECHO ADULT FOLLOW-UP OR LIMITED 2023    Interpretation Summary    Left Ventricle: EF by visual approximation is 20%. Left ventricle is mildly dilated. Mitral Valve: Moderately thickened leaflet, at the anterior and posterior leaflets. Moderately calcified leaflet. Moderate regurgitation. Left Atrium: Left atrium is moderately dilated. Technical qualifiers: Echo study was technically difficult with poor endocardial visualization. Contrast used: Definity.     Limited study, not all structures viewed    Signed by: Gustavo Rascon MD on 1/27/2023  4:39 PM       Pertinent imaging reviewed and interpreted as noted above    Pedro Hewitt  I had a face to face encounter with the patient, reviewed and interpreted patient data including clinical events, labs, images, vital signs, I/O's, and examined patient. I have discussed the case and the plan and management of the patient's care with the consulting services, the bedside nurses and the respiratory therapist.      NOTE OF PERSONAL INVOLVEMENT IN CARE   This patient has a high probability of imminent, clinically significant deterioration, which requires the highest level of preparedness to intervene urgently. I participated in the decision-making and personally managed or directed the management of the following life and organ supporting interventions that required my frequent assessment to treat or prevent imminent deterioration. I personally spent 35 minutes of critical care time. This is time spent at this critically ill patient's bedside actively involved in patient care as well as the coordination of care. This does not include any procedural time which has been billed separately. Walker Hope MD  Staff [facility]

## 2023-03-11 NOTE — PROGRESS NOTES
0745: Bedside and Verbal shift change report received from Keyla Murphy to [email or web address]. Report included the following information SBAR, Kardex, Intake/Output, MAR, Recent Results, Med Rec Status, Cardiac Rhythm Sinus tachycardia, and Alarm Parameters . 0800: Spoke with Nataly Trejo MD regarding plan- HD catheter placement this morning and repeat CT scan at noon. HD treatment today. 0815: Pt grimaces to pain only, eyes open to stimulus- no command following at this time, difficult to arouse despite painful stimuli, PERRLA.    0905: 1 unit PRBC's initiated. [id number] Vann MD at bedside for [facility] placement. 1000: CXR at bedside, okay given for use of Hermelindo per intensivist.    1130: 1 unit PRBC's completed. 1140: Pt transported for CT head. 1235: Pt with spontaneous eye opening - tracks with eyes, weakly moves BLE spontaneously, but not to command. Pt following commands to LUE, squeezes hand (frequent verbal prompting required)   No command following with RUE.     1300: HD at bedside. 1400: SBP 80s, trending down. Nataly Trejo MD notified. Orders received to initiate Levophed as needed. 25% Albumin infusing currently. 1610: HD completed - 2L off.     1630: MAP 80, Levophed weaned off. Repeat H&H sent. 1945: Bedside and Verbal shift change report given to [facility] Josue Murphy (oncoming nurse) by [email or web address] (offgoing nurse). Report included the following information SBAR, Kardex, Intake/Output, MAR, Recent Results, Med Rec Status, Cardiac Rhythm Sinus tachycardia, and Alarm Parameters .

## 2023-03-12 NOTE — PROGRESS NOTES
CRITICAL CARE NOTE      Name: Pollo Muro   : 1950   MRN: [id number]   Date: 3/12/2023      Reason for ICU Admission: Acute on chronic HFrEF     ICU PROBLEM LIST   Acute on chronic HFrEF (20%) NYHA IIIb/IV (D) on home inotropic support, life vest s/p LVAD  TANNER on CKD3, on CRRT  CHB post op, resolved  Aflutter w/ RVR  Acute on chronic hypoxemic respiratory failure, s/p trach placement  CAP  Gastric neuroendocrine tumor  Hx of LUE DVT  DM  PAD  TRISTAN  BPH w/ urinary retention requiring chronic donnelly    HISTORY OF PRESENT ILLNESS:   In brief, A 71year old male PMH as noted above admitted to [facility] on  due to ongoing symptoms secondary to his HFrEF. Treated for possible CAP, and diuresed for acute on chronic HFrEF. Nephrology following for TANNER on CKD 3. AHF team recommended transfer to CVICU for [facility] placement and ongoing LVAD workup, with placement 2/15. Pt extubated , however with development of worsening renal dysfunction overnight and unable to tolerate CRRT so was reintubated and taken for Impella RP placement for right-sided support in a.m. of  and CRRT resumed. Impella removed on 3/1. Overnight 3/1-3/1 CVA noted with SAH.  3/2 SAH enlarged. Held anticoagulation. Failed extubation 3/2. Trach placed 3/7. [facility]ed to [facility]. Repeat CTH 3/10 w/ persistent SAH, heparin held. 24 HOUR EVENTS:   Pt more responsive to verbal stimulus. Unable to move extremities but blinks appropriately. Afebrile. BP and HR stable. ASSESSMENT AND PLANS:       NEUROLOGICAL:    Acute encephalopathy due to [facility]  Possible ICU polyneuropathy?    -PRN anxiolytics, pain regimen  -Avoid sedation if possible  -Repeat Head CT on 3/11 with stable SAH. Will repeat Head on 3/14  -Off anticoagulation   -Delirium precautions    PULMONOLOGY:   Acute hypoxic respiratory failure.  Failed extubation  S/p trach    -Will continue weaning trial  -Lung protective ventilation, on minimal vent support at this time  -SLP consulted for in-line PMV trial  -Monitor bleeding at [facility] site  -PT OT, sit up in bed with goal out of bed to chair as tolerated    CARDIOVASCULAR:   Ischemic and non-ischemic cardiomyopathy with LVEF at 25-30%  S/p LVAD on 2/15/2023  S/p impella RP, removed on 3/1  Hepatocongestion  Cardiorenal syndrome    -MAP goal >65 <85, PRN vasopressors, antihypertensives  -ECHO 3/10 Right ventricle is moderately dilated. Mildly reduced systolic function LVID and TAPSE improved. -CVP goal 10-12  -C/w ASA, statin  -Goal euvolemia  -Unable to tolerate BB, ARNI/ARB due to hypotension, aldactone held 2/2 elevated K     GASTROINTESTINAL:   Hepatocongestion  Hyperbilirubinemia      -Will continue feeding via  tube feeds  -PPI   -Trend LFTs  -Hepatology has been consulted, hepatic dysfunction likely 2/2 low flow and venous stasis  -Possible Pancreatitis?  -US unable to assess pancreas due to intestinal air, evidence of chronic hepatic congestion    RENAL/ELECTROLYTE/FLUIDS:   Aucte on Chronic renal failure. Was on CRRT, now iHD    -Strict I/Os,  goal  euvolemia  -HD per nephrology   -Monitor RFP  -Mg/Phos/K >2/3/4  -Vasquez to remain in place    ENDOCRINE:   SSI, Basal insulin   Hypoglycemic protocol  Glucose goal 120-180    HEMATOLOGY/ONCOLOGY:   Acute on chronic anemia.  No evidence of active bleeding  Gastric neuroendocrine tumor, well differentiated, good prognosis per onc.     -Continue with heparin drip  -Hemoglobin goal greater than 7, platelet goal greater than 50  -EPO weekly    ID/MICRO:   Ucx/Bcx NGTD  -Off cefepime  -Urine with Candida s/p fluconazole    ICU DAILY CHECKLIST     Code Status:Full  DVT Prophylaxis:heparin  T/L/D: trach, CVC,  PIV,  Vasquez,  V  wire, LVAD drive line  SUP: PPI  Diet: TF  Activity Level:ad jose f  ABCDEF Bundle/Checklist Completed:Yes  Disposition: Stay in ICU  Multidisciplinary Rounds Completed:  yes  Patient/Family Updated: Yes    Tatiana   2/3 Transferred to CVICU for PAC placement   started on dobutamine as adjunct to milrinone  2/15 LVAD implant   extubated   Impella RP placement  3/1 Impella removed  3/1-2 SAH on CT  3/3 RE-intubated  3/6 Blakes removed, transitioned to HD  3/7 Trach placment  3/10 persistent SAH, heparin stopped  3/11 HD catheter replaced    SUBJECTIVE:     As noted above    Review of Systems:     Unable to perform due to patient intubated    OBJECTIVE:     Labs and Data: Reviewed 23  Medications: Reviewed 23  Imaging: Reviewed 23    General - somnolent, chronically ill appearing, eyes open. HEENT- pupils equal, nystagmus, jaundiced  Neuro - unable to move extremities, eyes open and blinks appropriately when asked  CV - Paced,  VAD hum, post sternotomy  Resp - trach, good airflow bilateral without wheezing or rales  Abd - soft, distended, nontender, no guarding  MSK- intact, no sacral ulcer, anasarca  SKIN: juandiced    Visit Vitals  /60   Pulse (!) 110   Temp (!) 101.5 °F (38.6 °C)   Resp 25   Ht 5' 6\" (1.676 m)   Wt 64.3 kg (141 lb 12.1 oz)   SpO2 (!) 62%   BMI 22.88 kg/m²    O2 Flow Rate (L/min): 20 l/min O2 Device: Tracheostomy, Ventilator Temp (24hrs), Av.5 °F (36.9 °C), Min:96.9 °F (36.1 °C), Max:101.5 °F (38.6 °C)    CVP (mmHg): 16 mmHg (23 0600)      Intake/Output:     Intake/Output Summary (Last 24 hours) at 3/12/2023 0654  Last data filed at 3/12/2023 0000  Gross per 24 hour   Intake 1608.81 ml   Output 2000 ml   Net -391.19 ml         Imaging  All images and labs were reviewed by me personally. 23    ECHO ADULT FOLLOW-UP OR LIMITED 2023    Interpretation Summary    Left Ventricle: EF by visual approximation is 20%. Left ventricle is mildly dilated. Mitral Valve: Moderately thickened leaflet, at the anterior and posterior leaflets. Moderately calcified leaflet. Moderate regurgitation. Left Atrium: Left atrium is moderately dilated.     Technical qualifiers: Echo study was technically difficult with poor endocardial visualization. Contrast used: Definity. Limited study, not all structures viewed    Signed by: Grace Mccauley MD on 1/27/2023  4:39 PM       Pertinent imaging reviewed and interpreted as noted above    CRITICAL CARE DOCUMENTATION  I had a face to face encounter with the patient, reviewed and interpreted patient data including clinical events, labs, images, vital signs, I/O's, and examined patient. I have discussed the case and the plan and management of the patient's care with the consulting services, the bedside nurses and the respiratory therapist.      NOTE OF PERSONAL INVOLVEMENT IN CARE   This patient has a high probability of imminent, clinically significant deterioration, which requires the highest level of preparedness to intervene urgently. I participated in the decision-making and personally managed or directed the management of the following life and organ supporting interventions that required my frequent assessment to treat or prevent imminent deterioration. I personally spent 40 minutes of critical care time. This is time spent at this critically ill patient's bedside actively involved in patient care as well as the coordination of care. This does not include any procedural time which has been billed separately.     Chapo Tate MD  Pulmonary Critical Care Medicine  [facility] Critical Care

## 2023-03-12 NOTE — PROGRESS NOTES
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 Yamilka Barakat  C/b RV failure requiring Impella RP placed 23 and discontinued 3/1/23  C/b acute on chronic renal failure, requiring CRRT  C/b hepatic failure, TB 12 (peak 15.3)  C/b lactic acidosis, persistent  C/b persistent septic shock c/b vasodilation and high outputs, on multiple antibiotics, procalcitonin persistently elevated  C/b R SUPA occlusion with small area of matched perfusion defect - subacute infarct c/b left sided hemiparesis  C/b pancreatitis  C/b failure to extubate x 2; anticipating tracheostomy today  Patient was approved for LVAD implantation as destination therapy at [facility] 2/3/23; the following have been identified and d/w patient as relative concerns pertaining to LVAD candidacy: small body surface area, cachexia, BMI 18, malnutrition, frailty, advanced age, muscular deconditioning, PVD (fingertips), urinary retention requiring donnelly catheter, neuroendocrine tumor class 1 requiring treatment after LVAD, mild neurocognitive dysfunction, chronic kidney disease and hearing loss requiring hearing aid  Family meeting weekly          RECOMMENDATIONS:  Continue LVAD at 4600rpm- repeat LVIDd 4.6cm  Repeat Head CT Monday/Tuesday   Repeat TTE on Monday   Check ESR tomorrow  Trend PCT- every 3 days  No beta-blockers due to hypotension and RV conditioning prior to LVAD  Cannot tolerate ARB/ARNi due to hypotension and upcoming surgical procedure   Cannot tolerate spironolactone due to hyperkalemia  Cannot tolerate jardiance due to significant diuresis on smallest dose/contributed to IVVD  Previously discontinued corlanor due to SVT  Digoxin stopped d/t risk for toxicity with amio loading  Discontinued amio due to intermitted heart blocks under pacemaker  HD per nephrology T/TR/SAT ; goal CVP 10-12  UF on alternative days   Keep K+ >4 and Mg >2   ID recommendations appreciated- pan culture NGTD  Continue colchicine 0.6mg daily for possible pericarditis  Hold ASA d/t CVA   Continue to hold coumadin  Heparin gtt on hold for now due to slightly progressive on SAH on head CT  Hepatology recommendations appreciated   Cannot tolerate statins due to abnormal LFT  Management of tube feeds in light of pancreatitis per intensivist  Continue bowel regimen   Daily dressing changes to Drive line exit site  Pulmonary hygiene- continue SBT   VAD education with caregivers/patient when able by VAD coordinator  D/w  bedside staff      INTERVAL HISTORY:  T max 100.5  MAPs 70-80s, HR 100s  CVP 8-13  I/O net negative 1.1Lml  Hgb down to 8  Ca down to 10.6  Cr 3.1  TB up to 19.4  LFTs unchanged   PBNP down to 17063  Lactic  2.2- trending down   Head CT- unchanged SAH  More alert today        IMPRESSION:  Acute on chronic combined systolic/diastolic  heart failure  Stage D, NYHA class IV symptoms   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, LVEF 20% (by echo 1/2023) and 23% (by cMRI 1/3/23)  Cardiac MRI suggestive of ischemic cardiomyopathy  PYP equivocal  S/p HeartMate 3  LVAD-DT (2/15/23)  C/b RV failure requiring Impella RP placed 2/18/23 and discontinued 3/1/23  C/b acute on chronic renal failure, requiring CRRT  C/b hepatic failure, TB 12 (peak 15.3)  C/b lactic acidosis, persistent  C/b persistent septic shock c/b vasodilation and high outputs, on multiple antibiotics, procalcitonin persistently elevated  C/b R SUPA occlusion with small area of matched perfusion defect - subacute infarct c/b left sided hemiparesis  C/b pancreatitis  C/b failure to extubate x 2; anticipating tracheostomy this week  Coronary artery disease  CAD s/p CABG x 2: further disease best managed medically due to small vessel size   At risk of sudden cardiac death  Peripheral arterial disease  Bilateral hydronephrosis s/p andrzej  Cardiac risk factors:  HTN  HL  TRISTAN, STOP-BANG 4  DM2  CKD, stage 3  MOCA from 2/9 19/30, consistent with mild cognitive impairment  Hard of hearing  Gastric Neuroendocrine Tumor, elevated gastrin and chromogranin A  Septic shock, improving   Left sided weakness noted night 3/1. +SAH and subacute occlusion distal R cerebral artery         LIFE GOALS: not readdressed today   Patient's personal goals included: having a few more years with family  Important upcoming milestones or family events: None at this time   The patient identified the following as important for living well: being at home, not being SOB            CXR (3/5/23) mild pulmonary edema. Small pleural effusions and bibasilar airspace opacities. Head CTA (3/2/23) Occlusion distal right anterior cerebral artery, with associated small area of matched perfusion defect and cortical enhancement, consistent with subacute infarct. Diminutive and irregular right vertebral artery, occluded at the V3-4 junction, age indeterminate extensive multifocal atherosclerosis in the intracranial and extracranial circulation. CARDIAC IMAGING:   ECHO- 3/6/23       Left Ventricle: Severely reduced left ventricular systolic function with a visually estimated EF of 15 - 20%. Left ventricle is moderately dilated. LVAD is present. Right Ventricle: Right ventricle is moderately dilated. Mildly reduced systolic function. Echo 2/19/23    Left Ventricle: Severely reduced left ventricular systolic function with a visually estimated EF of 20 - 25%. Not well visualized. Left ventricle size is normal. Increased wall thickness. Unable to assess wall motion. Abnormal diastolic function. LVAD is present.  LVAD inflow cannula was not well visualized. Right Ventricle: Not well visualized. Right ventricle is mildly dilated. Moderately reduced systolic function. TAPSE is abnormal.    Mitral Valve: Severe annular calcification at the anterior and posterior leaflets of the mitral valve. Mild regurgitation. Left Atrium: Left atrium is dilated. Right Atrium: Right atrium is dilated. Technical qualifiers: Echo study was technically difficult and technically difficult with poor endocardial visualization. Echo 1/27/23    Left Ventricle: EF by visual approximation is 20%. Left ventricle is mildly dilated. Mitral Valve: Moderately thickened leaflet, at the anterior and posterior leaflets. Moderately calcified leaflet. Moderate regurgitation. Left Atrium: Left atrium is moderately dilated. Technical qualifiers: Echo study was technically difficult with poor endocardial visualization. Contrast used: Definity. Limited study, not all structures viewed     Echo 1/9/23   Left Ventricle: Severely reduced left ventricular systolic function with a visually estimated EF of 25 - 30%. Left ventricle size is normal. Mildly increased wall thickness. Echo 12/26/22    Left Ventricle: Severely reduced left ventricular systolic function with a visually estimated EF of 25 - 30%. Left ventricle size is normal. Normal wall thickness. There are regional wall motion abnormalities. Grade II diastolic dysfunction with increased LAP. Right Ventricle: Moderately reduced systolic function. TAPSE is abnormal. TAPSE is 1.1 cm. Aortic Valve: Mild stenosis of the aortic valve. AV peak gradient is 13 mmHg. AV peak velocity is 1.8 m/s. Mitral Valve: Not well visualized. Moderate annular calcification at the posterior leaflet of the mitral valve. Mild to moderate regurgitation. Tricuspid Valve: Mildly elevated RVSP. Left Atrium: Left atrium is moderately dilated. 12/8/22    Left Ventricle:  Moderately reduced left ventricular systolic function with a visually estimated EF of 35 - 40%. Severe hypokinesis of the following segments: mid anteroseptal, apical anterior, apical septal, apical inferior and apical lateral. Severe hypokinesis of the apex. Mitral Valve: Severely thickened leaflet, at the anterior and posterior leaflets. Severely calcified leaflet, at the anterior and posterior leaflets. Mild annular calcification of the mitral valve. Moderate regurgitation. Left Atrium: Left atrium is mildly dilated. Contrast used: Definity. limited study     EKG 12/22/22 ST, Biatria enlargement, marked ST abnormality     [facility] 12/6/22  1. Normal LVEDP  2. Severe native multivessel coronary artery disease  3. Patent LIMA to LAD and vein graft to distal RCA  4. Recurrent ISR in OM1 stent with now 60 to 70% restenosis  5. Recoil of left main and circumflex stent with now recurrent 40 to 50% stenosis. 6.  Progression of ostial left main disease now to about 60% stenosis  7. Progression of disease in jailed first marginal branch now with diffuse 90% stenosis  8.   High-grade stenosis in the mid to distal right potential femoral artery treated with 6 x 40 mm impact drug-coated balloon angioplasty to reduce the stenosis to less than 40%        HEMODYNAMICS:  RHC 1/9/23  PA 20/9, RA 3, PCWP 8, CI 1.8     CPEST too ill   6MW 300 feet    LVAD INTERROGATION:  Device interrogated in person  Device function normal, normal flow, no events  LVAD   Pump Speed (RPM): 4600  Pump Flow (LPM): 3  MAP: 80  PI (Pulsitility Index): 5.3  Power: 3   Test: Yes  Back Up  at Bedside & Labeled: Yes  Power Module Test: Yes  Driveline Site Care: No  Driveline Dressing: Clean, Dry, and Intact  Testing  Alarms Reviewed: Yes  Back up SC speed: 4600  Back up Low Speed Limit: 4200  Emergency Equipment with Patient?: Yes  Emergency procedures reviewed?: Yes  Drive line site inspected?: No (covered by dressing)  Drive line intergrity inspected?: Yes  Drive line dressing changed?: No    PHYSICAL EXAM:  Visit Vitals  /60   Pulse 99   Temp 99.9 °F (37.7 °C)   Resp 25   Ht 5' 6\" (1.676 m)   Wt 141 lb 12.1 oz (64.3 kg)   SpO2 96%   BMI 22.88 kg/m²     Hemodynamics:      CVP: CVP (mmHg): 12 mmHg (03/12/23 0900)         Physical Assessment:   Physical Exam  Vitals and nursing note reviewed. Constitutional:       Appearance: He is ill-appearing. Cardiovascular:      Rate and Rhythm: Regular rhythm. Tachycardia present. Heart sounds: Normal heart sounds. No murmur heard. Comments: + VAD hum  Pulmonary:      Breath sounds: Rhonchi present. Comments: intubated  Abdominal:      General: There is distension. Palpations: Abdomen is soft. Musculoskeletal:         General: No swelling. Skin:     General: Skin is warm and dry. Coloration: Skin is jaundiced. Neurological:      Mental Status: He is alert.       Comments: sedated              REVIEW OF SYSTEMS:  Review of Systems   Unable to perform ROS: Acuity of condition      PAST MEDICAL HISTORY:  Past Medical History:   Diagnosis Date    CAD (coronary artery disease) 11/10/2016    NSTEMI & 2 stents    Deafness 10/28/2012    DM (diabetes mellitus) ([facility] [address]     Elevated cholesterol     Hypertension     NSTEMI (non-ST elevated myocardial infarction) ([facility] [address] 11/10/2016       PAST SURGICAL HISTORY:  Past Surgical History:   Procedure Laterality Date    COLONOSCOPY N/A 6/28/2018    COLONOSCOPY performed by Jamie Jennings MD at [facility] ENDOSCOPY    COLONOSCOPY N/A 1/18/2023    COLONOSCOPY performed by Gagan Morales MD at [facility] ENDOSCOPY    [address]  11/11/2016    2 stents       FAMILY HISTORY:  Family History   Problem Relation Age of Onset    Heart Disease Father     Heart Attack Father     Hypertension Mother     Elevated Lipids Brother     Elevated Lipids Brother     No Known Problems Sister     Elevated Lipids Brother     No Known Problems Son No Known Problems Daughter     Anesth Problems Neg Hx        SOCIAL HISTORY:  Social History     Socioeconomic History    Marital status:    Tobacco Use    Smoking status: Never     Passive exposure: Never    Smokeless tobacco: Never   Vaping Use    Vaping Use: Never used   Substance and Sexual Activity    Alcohol use: Yes     Alcohol/week: 2.0 standard drinks     Types: 1 Cans of beer, 1 Shots of liquor per week     Comment: rarely    Drug use: No    Sexual activity: Yes     Social Determinants of Health     Financial Resource Strain: Medium Risk    Difficulty of Paying Living Expenses: Somewhat hard   Food Insecurity: Food Insecurity Present    Worried About Running Out of Food in the Last Year: Never true    Ran Out of Food in the Last Year: Often true       LABORATORY RESULTS:     Labs Latest Ref Rng & Units 3/12/2023 3/11/2023 3/11/2023 3/10/2023 3/9/2023 3/8/2023 3/8/2023   WBC 4.1 - 11.1 K/uL 7.1 - 8.1 11. 4(H) 11.0 - 7.6   RBC 4.10 - 5.70 M/uL 2.50(L) - 2.26(L) 2.35(L) 2.53(L) - 2.54(L)   Hemoglobin 12.1 - 17.0 g/dL 8. 1(L) 9.0(L) 7. 2(L) 7. 6(L) 7. 9(L) 8.0(L) 7. 9(L)   Hematocrit 36.6 - 50.3 % 25. 2(L) 28. 4(L) 23. 4(L) 23. 5(L) 25. 7(L) 25. 5(L) 25. 6(L)   MCV 80.0 - 99.0 . 8(H) - 103. 5(H) 100. 0(H) 101. 6(H) - 100. 8(H)   Platelets 565 - 767 K/uL 239 - 261 337 358 - 254   Lymphocytes 12 - 49 % 8(L) - 7(L) 6(L) 11(L) - -   Monocytes 5 - 13 % 13 - 12 13 12 - -   Eosinophils 0 - 7 % 3 - 2 0 0 - -   Basophils 0 - 1 % 0 - 0 0 1 - -   Albumin 3.5 - 5.0 g/dL 4.0 - 3.9 4.0 4.0 - -   Calcium 8.5 - 10.1 MG/DL 10. 6(H) - 11. 6(H) 11. 6(H) 11. 9(H) - -   Glucose 65 - 100 mg/dL 200(H) - 215(H) 226(H) 126(H) - -   BUN 6 - 20 MG/DL 52(H) - 69(H) 44(H) 56(H) - -   Creatinine 0.70 - 1.30 MG/DL 3.17(H) - 2.51(H) 2.88(H) 3.53(H) - -   Sodium 136 - 145 mmol/L 138 - 139 137 137 - -   Potassium 3.5 - 5.1 mmol/L 3.8 - 3.8 4.1 5.0 - -   TSH 0.36 - 3.74 uIU/mL - - - - - - -   PSA 0.01 - 4.0 ng/mL - - - - - - -   LDH 85 - 241 U/L 272(H) - 291(H) 342(H) 357(H) - -   Some recent data might be hidden     Lab Results   Component Value Date/Time    TSH 3.52 01/28/2023 05:26 AM    TSH 2.12 12/27/2022 02:36 PM    TSH 4.80 (H) 12/06/2022 03:53 AM    TSH 5.39 (H) 10/12/2022 09:10 AM    TSH 3.53 02/03/2022 11:47 AM    TSH 5.790 (H) 11/21/2019 04:45 PM    TSH 3.08 06/22/2018 01:53 PM    TSH 4.250 05/26/2015 09:43 AM       ALLERGY:  Allergies   Allergen Reactions    Ambien [Zolpidem] Other (comments)     Causes extreme confusion        CURRENT MEDICATIONS:    Current Facility-Administered Medications:     0.9% sodium chloride infusion 250 mL, 250 mL, IntraVENous, PRN, Walker Montalvo MD    0.9% sodium chloride infusion 250 mL, 250 mL, IntraVENous, PRN, Lizette Linton, NP    oxyCODONE IR (ROXICODONE) tablet 5 mg, 5 mg, Oral, Q4H PRN, Amanda Randall DO    oxyCODONE IR (ROXICODONE) tablet 10 mg, 10 mg, Oral, Q4H PRN, David Jorgensen DO    pantoprazole (PROTONIX) 40 mg in 0.9% sodium chloride 10 mL injection, 40 mg, IntraVENous, DAILY, Walker Aponte MD, 40 mg at 03/12/23 0830    cinacalcet (SENSIPAR) tablet 60 mg, 60 mg, Oral, DAILY WITH BREAKFAST, Alia Longoria MD, 60 mg at 03/12/23 0829    insulin NPH (NOVOLIN N, HUMULIN N) injection 5 Units, 5 Units, SubCUTAneous, Q12H, Cathy Sheldon CNS, 5 Units at 03/12/23 0828    labetaloL (NORMODYNE;TRANDATE) injection 5 mg, 5 mg, IntraVENous, Q4H PRN, Na MOLINA MD, 5 mg at 03/09/23 0307    [Held by provider] heparin 25,000 units in D5W 250 ml infusion, 400 Units/hr, IntraVENous, CONTINUOUS, Lizette Linton NP, Stopped at 03/10/23 1350    levETIRAcetam (KEPPRA) injection 250 mg, 250 mg, IntraVENous, Once per day on Tue Thu Sat, Walker Montalvo MD, 250 mg at 03/11/23 2114    0.9% sodium chloride infusion, 14 mL/hr, IntraVENous, CONTINUOUS, Na MOLINA MD, Last Rate: 14 mL/hr at 03/09/23 0757, 14 mL/hr at 03/09/23 0757    levETIRAcetam (KEPPRA) injection 500 mg, 500 mg, IntraVENous, DAILY, Walker Morgan MD, 500 mg at 03/12/23 0830    albumin human 25% (BUMINATE) solution 25 g, 25 g, IntraVENous, DIALYSIS PRN, Joann Gregory MD, 25 g at 03/11/23 1352    insulin lispro (HUMALOG) injection, , SubCUTAneous, Q6H, Beto MOLINA MD, 2 Units at 03/12/23 1688    hydrALAZINE (APRESOLINE) 20 mg/mL injection 5 mg, 5 mg, IntraVENous, Q6H PRN, Margaret Ng MD, 5 mg at 03/10/23 1316    hydrALAZINE (APRESOLINE) tablet 5 mg, 5 mg, Oral, PRN, Margaret Ng MD    niCARdipine (CARDENE) 25 mg in 0.9% sodium chloride 250 mL ([id number]), 0-15 mg/hr, IntraVENous, TITRATE, Marianne Kaur DO, Stopped at 03/05/23 1925    aspirin chewable tablet 81 mg, 81 mg, Per NG tube, DAILY, Violet Granados MD, 81 mg at 03/12/23 0830    bicarbonate dialysis (PRISMASOL) BG K 4/Ca 2.5 5000 ml solution, , Extracorporeal, DIALYSIS CONTINUOUS, Stephenie Schultz MD, Last Rate: 1,750 mL/hr at 03/05/23 1106, New Bag at 03/05/23 1106    epoetin heidy-epbx (RETACRIT) injection 10,000 Units, 10,000 Units, SubCUTAneous, Q TUE, THU & SAT, Stephenie Schultz MD, 10,000 Units at 03/11/23 2119    EPINEPHrine (ADRENALIN) 10 mg in 0.9% sodium chloride 250 mL infusion, 0-10 mcg/min, IntraVENous, TITRATE, Kita Johnson MD, Stopped at 03/05/23 0920    propofol (DIPRIVAN) 10 mg/mL infusion, 0-50 mcg/kg/min, IntraVENous, TITRATE, Klever Willis MD, Stopped at 03/02/23 1628    HYDROmorphone 30 mg/30 mL (1 mg/mL) infusion, 0.5-1 mg/hr, IntraVENous, CONTINUOUS, Kita Johnson MD, Stopped at 03/08/23 0516    dextrose (D50W) injection syrg 25 g, 25 g, IntraVENous, PRN, Cori Royal MD, 9 mL at 02/25/23 1225    neomycin-polymyxin-dexamethasone (DEXACINE) 3.5 mg/g-10,000 unit/g-0.1 % ophthalmic ointment, , Both Eyes, BID, Dasha Flores MD, Given at 03/12/23 0830    NOREPINephrine (LEVOPHED) 8 mg in 5% dextrose 250mL (32 mcg/mL) infusion, 0.5-16 mcg/min, IntraVENous, TITRATE, Kita Johnson MD, Stopped at 03/12/23 0555    colchicine tablet 0.6 mg, 0.6 mg, Oral, DAILY, Shaila, Lizette B, NP, 0.6 mg at 03/12/23 0829    heparin (porcine) 1,000 unit/mL injection 1,700 Units, 1,700 Units, InterCATHeter, DIALYSIS PRN, 1,700 Units at 03/09/23 1435 **AND** heparin (porcine) 1,000 unit/mL injection 1,500 Units, 1,500 Units, InterCATHeter, DIALYSIS PRN, Jarrell Mckinnon, DO, 1,500 Units at 03/09/23 1434    balsam peru-castor oiL (VENELEX) ointment, , Topical, BID, Tanya Groves MD, Given at 03/12/23 0830    acetaminophen (TYLENOL) solution 650 mg, 650 mg, Oral, Q4H PRN, Kapil Pena MD, 650 mg at 02/20/23 0401    acetaminophen (TYLENOL) suppository 650 mg, 650 mg, Rectal, Q4H PRN, Kapil Pena MD, 650 mg at 02/17/23 2013    HYDROmorphone (DILAUDID) injection 0.5 mg, 0.5 mg, IntraVENous, Q3H PRN, Miya MOLINA MD, 0.5 mg at 03/09/23 1225    HYDROmorphone (DILAUDID) injection 1 mg, 1 mg, IntraVENous, Q4H PRN, Miya MOLINA MD, 1 mg at 03/09/23 0557    [Held by provider] heparin 25,000 units in D5W 250 ml infusion, 12-25 Units/kg/hr, IntraVENous, TITRATE, Danuta Viveros MD, Stopped at 03/08/23 0515    albumin human 25% (BUMINATE) solution 12.5 g, 12.5 g, IntraVENous, Q6H, Danuta Viveros MD, Last Rate: 60 mL/hr at 02/27/23 0020, 12.5 g at 03/12/23 0640    glucose chewable tablet 16 g, 4 Tablet, Oral, PRN, Shaila, Lizette B, NP    glucagon (GLUCAGEN) injection 1 mg, 1 mg, IntraMUSCular, PRN, Shaila, Lizette B, NP    sodium chloride (NS) flush 5-40 mL, 5-40 mL, IntraVENous, Q8H, Shaila, Lizette B, NP, 10 mL at 03/12/23 0641    sodium chloride (NS) flush 5-40 mL, 5-40 mL, IntraVENous, PRN, Shaila, Lizette B, NP, 40 mL at 02/17/23 1818    naloxone (NARCAN) injection 0.4 mg, 0.4 mg, IntraVENous, PRN, Shaila, Lizette B, NP    ELECTROLYTE REPLACEMENT NOTE: Nurse to review Serum Potassium and Magnesuim levels and Initiate Electrolyte Replacement Protocol as needed, 1 Each, Other, PRN, Sandra Calzada, NP    dextrose 10 % infusion 0-250 mL, 0-250 mL, IntraVENous, PRN, Shaila, Lizette B, NP, Last Rate: 150 mL/hr at 02/24/23 0220, 75 mL at 02/24/23 0220    acetaminophen (TYLENOL) tablet 650 mg, 650 mg, Oral, Q6H PRN, Shaila, Lizette B, NP, 650 mg at 02/17/23 0200    ondansetron (ZOFRAN) injection 4 mg, 4 mg, IntraVENous, Q4H PRN, Shaila, Lizette B, NP, 4 mg at 03/09/23 1212    albuterol-ipratropium (DUO-NEB) 2.5 MG-0.5 MG/3 ML, 3 mL, Nebulization, Q6H PRN, Shaila, Lizette B, NP    senna-docusate (PERICOLACE) 8.6-50 mg per tablet 1 Tablet, 1 Tablet, Oral, DAILY, Shaila, Lizette B, NP, 1 Tablet at 03/11/23 0822    polyethylene glycol (MIRALAX) packet 17 g, 17 g, Oral, DAILY, Shaila, Lizette B, NP, 17 g at 03/03/23 0856    bisacodyL (DULCOLAX) suppository 10 mg, 10 mg, Rectal, DAILY PRN, Shaila, Lizette B, NP, 10 mg at 02/22/23 0839    0.45% sodium chloride infusion, 10 mL/hr, IntraVENous, CONTINUOUS, Elisabet Bacon MD, Stopped at 03/10/23 1707    DOBUTamine (DOBUTREX) 500 mg/250 mL (2,000 mcg/mL) infusion, 0-10 mcg/kg/min, IntraVENous, TITRATE, Nikki Johnson MD, Stopped at 02/23/23 1044    dexmedeTOMidine in 0.9 % NaCl (PRECEDEX) 400 mcg/100 mL (4 mcg/mL) infusion soln, 0.1-1.5 mcg/kg/hr, IntraVENous, TITRATE, Nikki Johnson MD, Stopped at 03/08/23 2300    PATIENT CARE TEAM:  Patient Care Team:  Saul Bateman MD as PCP - General (Family Medicine)  Saul Bateman MD as PCP - [facility]  Sandra Elizabeth MD (Cardiovascular Disease Physician)  Enmanuel Patel MD (Gastroenterology)  Patricia Ruiz MD (Cardiothoracic Surgery)  Louise Rodriguez MD (Cardiovascular Disease Physician)  Ryan Arita MD (Nephrology)  Petty Powell MD (Pulmonary Disease)  Elisabet Bacon MD ([address] Vascular Surgery)     Thank you for allowing me to participate in this patient's care.     Maria G Mac NP   [address] [place] [address] Mahendra Sullivan, [address]  Phone: [phone]    Total Critical Care time spent:    40 minutes. There was no overlap with other services        Critical care was necessary to treat or prevent imminent or life threatening deterioration of the following conditions: cardiac failure, respiratory failure and CNS failure or compromise     Services Provided:  1. Telemetry review and 12 lead ECG interpretation  2. Hemodynamic interpretation, assessment, and management  3. Review and interpretation of CXR  4. Review and interpretation of lab values  5. Review and interpretation of microbiologic data and culture results  6. Review of medications and administration  7. Review and interpretation of nutrition requirements and management  8. Discussion of management withother consultants and services  9.  Clinical update to family members

## 2023-03-12 NOTE — PROGRESS NOTES
2000- Bedside and Verbal shift change report given to Gerardo Kelly (oncoming nurse) by Emre Bey (offgoing nurse). Report included the following information SBAR, Intake/Output, MAR, Recent Results, Cardiac Rhythm ST, and Alarm Parameters . 2200- Patient voided medium blood clot, large BM.    0545- Dialysis RN at bedside starting treatment. Shift summary- Levo on and off for low MAPS, no command following, opening eyes and tracking. Bedside and Verbal shift change report given to Danica(oncoming nurse) by Gerardo Kelly (offgoing nurse). Report included the following information SBAR, Intake/Output, MAR, Recent Results, Cardiac Rhythm ST, and Alarm Parameters .

## 2023-03-12 NOTE — PROGRESS NOTES
Problem: Mobility Impaired (Adult and Pediatric)  Goal: *Acute Goals and Plan of Care (Insert Text)  Description: FUNCTIONAL STATUS PRIOR TO ADMISSION: Patient was modified independent using a rollator for functional mobility. Pt recently d/c home after previous hospital stay. Able to ambulate community distances. HOME SUPPORT PRIOR TO ADMISSION: The patient lived with spouse but did not require assist.    Physical Therapy Goals  Initiated 3/12/2023  1. Patient will move from supine to sit and sit to supine , scoot up and down, and roll side to side in bed with maximal assistance within 7 days. 2.  Patient will sit EOB x3 minutes with max assist within 7 days. 3.  Patient will follow 50% of commands for participation in AROM while supine within 7 days. 4.  Patient will tolerate bed in chair position 30 min BID within 7 days. 5.  Family will be independent and compliant with in PROM for all extremities within 7 days. Updated 2/13/2023  1. Patient will move from supine to sit and sit to supine in bed with modified independence within 7 day(s). MET 2/13  2. Patient will transfer from bed to chair and chair to bed with modified independence using the least restrictive device within 7 day(s). MET 2/13  3. Patient will perform sit to stand with modified independence within 7 day(s). MET 2/13  4. Patient will ambulate with modified independence for 300 feet with the least restrictive device within 7 day(s). MET 2/13  5. Patient will ascend/descend 12 stairs with 1 handrail(s) with supervision/set-up within 7 day(s). 6.  Patient will verbally and functionally recall move in the tube techniques in prep for possible LVAD within 7 days. Physical Therapy Goals  Initiated 1/28/2023-- Goals appropriate and add #6 2/6/2023  1. Patient will move from supine to sit and sit to supine  in bed with modified independence within 7 day(s).     2.  Patient will transfer from bed to chair and chair to bed with modified independence using the least restrictive device within 7 day(s). 3.  Patient will perform sit to stand with modified independence within 7 day(s). 4.  Patient will ambulate with modified independence for 300 feet with the least restrictive device within 7 day(s). 5.  Patient will ascend/descend 12 stairs with 1 handrail(s) with supervision/set-up within 7 day(s). 6.  Patient will verbally and functionally recall move in the tube techniques in prep for possible LVAD within 7 days. Outcome: Progressing Towards Goal   PHYSICAL THERAPY EVALUATION/RE-EVALUATION  Patient: Romain Wright (76 y.o. male)  Date: 3/12/2023  Primary Diagnosis: CHF (congestive heart failure) ([facility]) [I50.9]  Procedure(s) (LRB):  LEFT GROIN RP IMPELLA INSERTION, KIARRA BY DR Miguel Casarez (Left) 22 Days Post-Op   Precautions:  Fall (mindful movement), [place]      ASSESSMENT  Based on the objective data described below, the patient presents with impaired sitting balance, decreased endurance (now with trach and vent dependence), decreased AROM, poor command following, edema, and overall significant decline from baseline following LVAD implantation on 2/15/23 with post op course complicated by liver, kidney, and R ventricular failure (s/p R impella now removed). Received supine in bed, alert to voice and able to track PT's face to both sides, eyes crossing midline. Did not follow commands for active squeezing of PT's hands or moving toes. Provided PROM input and noted increased tone in R LE and more wincing in pain to PROM. Noted active movement of fingers in both hands (not to command) but did not note active movement in LEs. Bed placed in chair position and attempted to orient head more to midline. Also noted drooling from L mouth. Recommend bed in chair position 30 min BID to encourage lung expansion, strengthening, and improved head control in prep for further EOB and OOB activities.  Plan to teach family PROM next session as appropriate. Current Level of Function Impacting Discharge (mobility/balance): total assist    Functional Outcome Measure: The patient scored 0/24 on the [facility] outcome measure which is indicative of increased need for therapy at d/c. Other factors to consider for discharge: prolonged and complicated post op course     Patient will benefit from skilled therapy intervention to address the above noted impairments. PLAN :  Recommendations and Planned Interventions: bed mobility training, transfer training, gait training, therapeutic exercises, neuromuscular re-education, edema management/control, patient and family training/education, and therapeutic activities      Frequency/Duration: Patient will be followed by physical therapy:  daily to address goals. Recommendation for discharge: (in order for the patient to meet his/her long term goals)  To be determined: rehab at some level pending progress    This discharge recommendation:  Has been made in collaboration with the attending provider and/or case management    Equipment recommendations for successful discharge (if) home: TBD         SUBJECTIVE:   Patient wincing with pain and attempting vocalizations but unable to make out what he was attempting to say. OBJECTIVE DATA SUMMARY:   Hospital course since last seen and reason for reevaluation: LVAD, R impella (now removed), now on HD, liver failure    EXAMINATION/PRESENTATION/DECISION MAKING:       Critical Behavior:  Neurologic State: Eyes open spontaneously  Orientation Level: Unable to verbalize  Cognition: Decreased attention/concentration, No command following  Safety/Judgement: Awareness of environment, Fall prevention, Insight into deficits  Hearing:   Auditory  Auditory Impairment: Hard of hearing, bilateral, Hearing aid(s)  Hearing Aids/Status: Left  Skin:  jaundiced  Range Of Motion:  AROM: Grossly decreased, non-functional  PROM: Generally decreased, functional  Strength:    Strength: Grossly decreased, non-functional  Tone & Sensation:   Tone: Abnormal (hypertonic R LE, mild clonus R ankle with a few beats)  Sensation:  (unable to assess)  Coordination:  Coordination: Grossly decreased, non-functional  Functional Measure:  Merary Duran AM-PAC®      Basic Mobility Inpatient Short Form (6-Clicks) Version 2  How much HELP from another person do you currently need. .. (If the patient hasn't done an activity recently, how much help from another person do you think they would need if they tried?) Total A Lot A Little None   1. Turning from your back to your side while in a flat bed without using bedrails? [x]  1 []  2 []  3  []  4   2. Moving from lying on your back to sitting on the side of a flat bed without using bedrails? [x]  1 []  2 []  3  []  4   3. Moving to and from a bed to a chair (including a wheelchair)? [x]  1 []  2 []  3  []  4   4. Standing up from a chair using your arms (e.g. wheelchair or bedside chair)? [x]  1 []  2 []  3  []  4   5. Walking in hospital room? [x]  1 []  2 []  3  []  4   6. Climbing 3-5 steps with a railing? [x]  1 []  2 []  3  []  4     Raw Score: 6/24                            Cutoff score ?171,2,3 had higher odds of discharging home with home health or need of SNF/IPR. [address], [place]. Validity of the AM-PAC 6-Clicks Inpatient Daily Activity and Basic Mobility Short Forms. Physical Therapy Mar 2014, [phone]) [phone]; DOI: 10.2522/ptj.84463775  2. Marcio Jackson. Association of AM-PAC \"6-Clicks\" Basic Mobility and Daily Activity Scores With Discharge Destination. Phys Ther. 2021 Apr 4;101(4):jeut770. doi: 10.1093/ptj/ffpc419. PMID: 86198158. V Sandra Land, Deepthi CASANOVA, Liya Lee, Segundo K, George S.  Activity Measure for Post-Acute Care \"6-Clicks\" Basic Mobility Scores Predict Discharge Destination After Acute Care Hospitalization in Select Patient Groups: A Retrospective, Observational Study. Arch Rehabil Res Clin Transl. 2022 Jul 16;4(3):316670. doi: 10.1016/j.arrct. 2977.949000. PMID: 51693867; PMCID: [id number]. 4. Eliseo Orlando Ni P. AM-PAC Short Forms Manual 4.0. Revised 2/2020. Pain Rating:  Wincing with pain with PROM    Activity Tolerance:   Stable vitals throughout bed level assessment      After treatment patient left in no apparent distress:   Call bell within reach and bed in chair position, heels elevated     COMMUNICATION/EDUCATION:   The patients plan of care was discussed with: Occupational therapist and Registered nurse. Fall prevention education was provided and the patient/caregiver indicated understanding., Patient/family have participated as able in goal setting and plan of care. , and Patient/family agree to work toward stated goals and plan of care.     Thank you for this referral.  Geoffrey Canseco PT, DPT   Time Calculation: 16 mins

## 2023-03-12 NOTE — PROGRESS NOTES
2000- Bedside and Verbal shift change report given to Roya Sosa (oncoming nurse) by Polina Whitman (offgoing nurse). Report included the following information SBAR, Procedure Summary, Intake/Output, Recent Results, Cardiac Rhythm SR/ST, and Alarm Parameters . 0200- System downtime was enacted for one hour to accomodate the ending of Daylight Saving Time at 2:00 a.m. Clinical documentation has been captured on paper to be scanned into the system. Medications administered during this time have been entered when the system became available. 6412- Patient aux temp 101.5, SBP upper 80s/ MAP 60, Intensivist notified, levo restarted at 4mcg/min.     0530- Bath started, blood clot from penis noted, Intesivist notified, no new orders    0540- Levo to 2mcg/ MAPs in 70s    0555- Levo stoppped/ MAPs in 70s

## 2023-03-12 NOTE — PROGRESS NOTES
0745: Bedside and Verbal shift change report received from [address] [place] [address] to [employer]. Report included the following information SBAR, Kardex, Intake/Output, MAR, Recent Results, Med Rec Status, Cardiac Rhythm Sinus tachycardia, and Alarm Parameters . 0800: Pt alert, tracking with eyes, PERRLA. Nods head appropriately to questions. Responsive to voice, however unable to elicit command following at this time. Weak withdrawl noted to all extremities. T: 99.9    1000: Shaila NP at bedside, plan for followup ECHO tomorrow. 1200: Pt with spontaneous eye opening, tracks with eyes. Command following present to BUE weakly squeezes hand and wiggles toes to RLE. Weak withdrawal present to LLE.  T: 98.2    1945: Bedside and Verbal shift change report given to [address] [place] [address] (oncoming nurse) by [employer] (offgoing nurse). Report included the following information SBAR, Kardex, Intake/Output, MAR, Recent Results, Med Rec Status, Cardiac Rhythm Sinus tachycardia, and Alarm Parameters .

## 2023-03-12 NOTE — PROGRESS NOTES
Occupational Therapy: defer    Chart reviewed. Note patient remains intubated following LVAD implant on 2/15/23. Note per RN patient is alert and nodding to questions/ responsive to voice but unable to follow commands. Will continue to defer OT and will follow-up for re-evaluation when patient is able to participate in functional assessment/ intervention.     Edel Case, OTR/L

## 2023-03-12 NOTE — PROGRESS NOTES
Name: Nader Clark   MRN: [id number]  : 1950      Assessment:  TANNER on CKD-3a: Suspect cardiorenal effects initially. Now has LVAD and  POST OP ATN. Anuric->Requiring CRRT>>now iHD    hypercalcemia/immobilization- also has elevated PTH; may have component of 1ry vs secondary HPT; s/p Calcitonin x 4 doses for high Ca    Possible pancreatitis- with elevated lipase; Hypercalcemia can contribute  Ischemic CM: Recurrent exacerbations. EF 20%. S/p LVAD on 2/15. Required Impella RP for RV support  CVA  Sepsis  BPH   Well differentiated NET Grade 1: noted on EGD 23  Anemia 2 to CKD:  s/p PRBCS  Left UE basilic and radial vein thrombus  Thrombocytopenia     Was on CVVH. Transition to IHD on 3/6/2023. Calcium is improved.   Got iUF yesterday for volume removal   His left IJ Hermelindo catheter was replaced on 3/11/2023 by intensivist  Calcium is down to 10.6    Plan/Recommendations:  Plan HD tomorrow with switching to MWF schedule   can also use pressors if needed  Ct Sensipar- dose increased on 3 9   Will hold off on IV Zometa or Pamidronate as last resort  BRIGHT      Subjective:  Remains ill on vent  Febrile +    ROS:   UTO    Exam:  Visit Vitals  /60   Pulse (!) 102   Temp 98.2 °F (36.8 °C)   Resp 24   Ht 5' 6\" (1.676 m)   Wt 64.3 kg (141 lb 12.1 oz)   SpO2 96%   BMI 22.88 kg/m²     NAD  +icterus  +trach  Tr edema-improved  + Jaundice    Current Facility-Administered Medications   Medication Dose Route Frequency Last Admin    albumin human 25% (BUMINATE) solution 12.5 g  12.5 g IntraVENous Q12H      0.9% sodium chloride infusion 250 mL  250 mL IntraVENous PRN      0.9% sodium chloride infusion 250 mL  250 mL IntraVENous PRN      oxyCODONE IR (ROXICODONE) tablet 5 mg  5 mg Oral Q4H PRN      oxyCODONE IR (ROXICODONE) tablet 10 mg  10 mg Oral Q4H PRN      pantoprazole (PROTONIX) 40 mg in 0.9% sodium chloride 10 mL injection  40 mg IntraVENous DAILY 40 mg at 03/12/23 0830    cinacalcet (SENSIPAR) tablet 60 mg  60 mg Oral DAILY WITH BREAKFAST 60 mg at 03/12/23 0829    insulin NPH (NOVOLIN N, HUMULIN N) injection 5 Units  5 Units SubCUTAneous Q12H 5 Units at 03/12/23 0828    labetaloL (NORMODYNE;TRANDATE) injection 5 mg  5 mg IntraVENous Q4H PRN 5 mg at 03/09/23 0307    [Held by provider] heparin 25,000 units in D5W 250 ml infusion  400 Units/hr IntraVENous CONTINUOUS Stopped at 03/10/23 1350    levETIRAcetam (KEPPRA) injection 250 mg  250 mg IntraVENous Once per day on Tue Thu Sat 250 mg at 03/11/23 2114    0.9% sodium chloride infusion  14 mL/hr IntraVENous CONTINUOUS 14 mL/hr at 03/09/23 0757    levETIRAcetam (KEPPRA) injection 500 mg  500 mg IntraVENous DAILY 500 mg at 03/12/23 0830    albumin human 25% (BUMINATE) solution 25 g  25 g IntraVENous DIALYSIS PRN 25 g at 03/11/23 1352    insulin lispro (HUMALOG) injection   SubCUTAneous Q6H 2 Units at 03/12/23 1156    hydrALAZINE (APRESOLINE) 20 mg/mL injection 5 mg  5 mg IntraVENous Q6H PRN 5 mg at 03/10/23 1316    hydrALAZINE (APRESOLINE) tablet 5 mg  5 mg Oral PRN      niCARdipine (CARDENE) 25 mg in 0.9% sodium chloride 250 mL ([id number])  0-15 mg/hr IntraVENous TITRATE Stopped at 03/05/23 1925    aspirin chewable tablet 81 mg  81 mg Per NG tube DAILY 81 mg at 03/12/23 0830    bicarbonate dialysis (PRISMASOL) BG K 4/Ca 2.5 5000 ml solution   Extracorporeal DIALYSIS CONTINUOUS New Bag at 03/05/23 1106    epoetin heidy-epbx (RETACRIT) injection 10,000 Units  10,000 Units SubCUTAneous Q TUE, THU & SAT 10,000 Units at 03/11/23 2119    EPINEPHrine (ADRENALIN) 10 mg in 0.9% sodium chloride 250 mL infusion  0-10 mcg/min IntraVENous TITRATE Stopped at 03/05/23 0920    propofol (DIPRIVAN) 10 mg/mL infusion  0-50 mcg/kg/min IntraVENous TITRATE Stopped at 03/02/23 1628    HYDROmorphone 30 mg/30 mL (1 mg/mL) infusion  0.5-1 mg/hr IntraVENous CONTINUOUS Stopped at 03/08/23 0516    dextrose (D50W) injection syrg 25 g  25 g IntraVENous PRN 9 mL at 02/25/23 1225    neomycin-polymyxin-dexamethasone (DEXACINE) 3.5 mg/g-10,000 unit/g-0.1 % ophthalmic ointment   Both Eyes BID Given at 03/12/23 0830    NOREPINephrine (LEVOPHED) 8 mg in 5% dextrose 250mL (32 mcg/mL) infusion  0.5-16 mcg/min IntraVENous TITRATE Stopped at 03/12/23 0555    colchicine tablet 0.6 mg  0.6 mg Oral DAILY 0.6 mg at 03/12/23 0829    heparin (porcine) 1,000 unit/mL injection 1,700 Units  1,700 Units InterCATHeter DIALYSIS PRN 1,700 Units at 03/09/23 1435    And    heparin (porcine) 1,000 unit/mL injection 1,500 Units  1,500 Units InterCATHeter DIALYSIS PRN 1,500 Units at 03/09/23 1434    balsam peru-castor oiL (VENELEX) ointment   Topical BID Given at 03/12/23 0830    acetaminophen (TYLENOL) solution 650 mg  650 mg Oral Q4H  mg at 02/20/23 0401    acetaminophen (TYLENOL) suppository 650 mg  650 mg Rectal Q4H  mg at 02/17/23 2013    HYDROmorphone (DILAUDID) injection 0.5 mg  0.5 mg IntraVENous Q3H PRN 0.5 mg at 03/09/23 1225    HYDROmorphone (DILAUDID) injection 1 mg  1 mg IntraVENous Q4H PRN 1 mg at 03/09/23 0557    [Held by provider] heparin 25,000 units in D5W 250 ml infusion  12-25 Units/kg/hr IntraVENous TITRATE Stopped at 03/08/23 0515    glucose chewable tablet 16 g  4 Tablet Oral PRN      glucagon (GLUCAGEN) injection 1 mg  1 mg IntraMUSCular PRN      sodium chloride (NS) flush 5-40 mL  5-40 mL IntraVENous Q8H 10 mL at 03/12/23 0641    sodium chloride (NS) flush 5-40 mL  5-40 mL IntraVENous PRN 40 mL at 02/17/23 1818    naloxone (NARCAN) injection 0.4 mg  0.4 mg IntraVENous PRN      ELECTROLYTE REPLACEMENT NOTE: Nurse to review Serum Potassium and Magnesuim levels and Initiate Electrolyte Replacement Protocol as needed  1 Each Other PRN      dextrose 10 % infusion 0-250 mL  0-250 mL IntraVENous PRN 75 mL at 02/24/23 0220    acetaminophen (TYLENOL) tablet 650 mg  650 mg Oral Q6H  mg at 02/17/23 0200    ondansetron (ZOFRAN) injection 4 mg  4 mg IntraVENous Q4H PRN 4 mg at 03/09/23 1212    albuterol-ipratropium (DUO-NEB) 2.5 MG-0.5 MG/3 ML  3 mL Nebulization Q6H PRN      senna-docusate (PERICOLACE) 8.6-50 mg per tablet 1 Tablet  1 Tablet Oral DAILY 1 Tablet at 03/11/23 0822    polyethylene glycol (MIRALAX) packet 17 g  17 g Oral DAILY 17 g at 03/03/23 0856    bisacodyL (DULCOLAX) suppository 10 mg  10 mg Rectal DAILY PRN 10 mg at 02/22/23 0839    0.45% sodium chloride infusion  10 mL/hr IntraVENous CONTINUOUS Stopped at 03/10/23 1707    DOBUTamine (DOBUTREX) 500 mg/250 mL (2,000 mcg/mL) infusion  0-10 mcg/kg/min IntraVENous TITRATE Stopped at 02/23/23 1044    dexmedeTOMidine in 0.9 % NaCl (PRECEDEX) 400 mcg/100 mL (4 mcg/mL) infusion soln  0.1-1.5 mcg/kg/hr IntraVENous TITRATE Stopped at 03/08/23 2300       Labs/Data:    Lab Results   Component Value Date/Time    WBC 7.1 03/12/2023 05:06 AM    HGB 8.1 (L) 03/12/2023 05:06 AM    HCT 25.2 (L) 03/12/2023 05:06 AM    PLATELET 224 08/77/4544 05:06 AM    .8 (H) 03/12/2023 05:06 AM       Lab Results   Component Value Date/Time    Sodium 138 03/12/2023 05:06 AM    Potassium 3.8 03/12/2023 05:06 AM    Chloride 104 03/12/2023 05:06 AM    CO2 22 03/12/2023 05:06 AM    Anion gap 12 03/12/2023 05:06 AM    Glucose 200 (H) 03/12/2023 05:06 AM    BUN 52 (H) 03/12/2023 05:06 AM    Creatinine 3.17 (H) 03/12/2023 05:06 AM    BUN/Creatinine ratio 16 03/12/2023 05:06 AM    GFR est AA 46 (L) 06/23/2022 09:40 AM    GFR est non-AA 38 (L) 06/23/2022 09:40 AM    eGFR 20 (L) 03/12/2023 05:06 AM    eGFR 69 01/25/2023 11:55 AM    Calcium 10.6 (H) 03/12/2023 05:06 AM       Wt Readings from Last 3 Encounters:   03/12/23 64.3 kg (141 lb 12.1 oz)   01/25/23 55.8 kg (123 lb)   01/23/23 54.9 kg (121 lb)         Intake/Output Summary (Last 24 hours) at 3/12/2023 1401  Last data filed at 3/12/2023 1300  Gross per 24 hour   Intake 1505.89 ml   Output 2000 ml   Net -494.11 ml         Patient seen and examined. Chart reviewed. Labs, data and other pertinent notes reviewed in last 24 hrs.     PMH/SH/FH reviewed and unchanged compared to H&P      Brandy Gore MD

## 2023-03-13 NOTE — PROGRESS NOTES
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 Roxanne Palumbo  C/b RV failure requiring Impella RP placed 23 and discontinued 3/1/23  C/b acute on chronic renal failure, requiring CRRT  C/b hepatic failure, TB 12 (peak 15.3)  C/b lactic acidosis, persistent  C/b persistent septic shock c/b vasodilation and high outputs, on multiple antibiotics, procalcitonin persistently elevated  C/b R SUPA occlusion with small area of matched perfusion defect - subacute infarct c/b left sided hemiparesis  C/b pancreatitis  C/b failure to extubate x 2; anticipating tracheostomy today  Patient was approved for LVAD implantation as destination therapy at [facility] 2/3/23; the following have been identified and d/w patient as relative concerns pertaining to LVAD candidacy: small body surface area, cachexia, BMI 18, malnutrition, frailty, advanced age, muscular deconditioning, PVD (fingertips), urinary retention requiring donnelly catheter, neuroendocrine tumor class 1 requiring treatment after LVAD, mild neurocognitive dysfunction, chronic kidney disease and hearing loss requiring hearing aid  Family meeting weekly         RECOMMENDATIONS:  Increase LVAD speed to 4700rpm- repeat LVIDd 5.3cm today  Use hydralazine 5mg IV q4h prn MAP>85  Repeat head CT pending  No beta-blockers due to hypotension and RV conditioning prior to LVAD  Cannot tolerate ARB/ARNi due to hypotension and upcoming surgical procedure   Cannot tolerate spironolactone due to hyperkalemia  Cannot tolerate jardiance due to significant diuresis on smallest dose/contributed to IVVD  Previously discontinued corlanor due to SVT  Digoxin stopped d/t risk for toxicity with amio loading  Discontinued amio due to intermitted heart blocks under pacemaker  HD per nephrology T/TR/SAT ; goal CVP 10-12  UF on alternative days   Keep K+ >4 and Mg >2   ID recommendations appreciated- pan culture NGTD  Continue colchicine 0.6mg daily for possible pericarditis  Hold ASA d/t CVA   Continue to hold coumadin  Heparin gtt on hold for now due to slightly progressive on SAH on head CT  Hepatology recommendations appreciated   Cannot tolerate statins due to abnormal LFT  Management of tube feeds in light of pancreatitis per intensivist  Continue bowel regimen   Daily dressing changes to Drive line exit site  Pulmonary hygiene- continue SBT   VAD education with caregivers/patient when able by VAD coordinator  D/w  bedside staff      INTERVAL HISTORY:  T max 99  MAPs 70-80s, HR 90-100s  CVP 8-13  I/O net negative 1.1Lml  Hgb down to 8  Ca down to 10.6  Cr 3.1  TB up to 20.6 from 19.4  LFTs unchanged   PBNP down to [address]  Head CT- unchanged SAH  More alert today        IMPRESSION:  Acute on chronic combined systolic/diastolic  heart failure  Stage D, NYHA class IV symptoms   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, LVEF 20% (by echo 1/2023) and 23% (by cMRI 1/3/23)  Cardiac MRI suggestive of ischemic cardiomyopathy  PYP equivocal  S/p HeartMate 3  LVAD-DT (2/15/23)  C/b RV failure requiring Impella RP placed 2/18/23 and discontinued 3/1/23  C/b acute on chronic renal failure, requiring CRRT  C/b hepatic failure, TB 12 (peak 15.3)  C/b lactic acidosis, persistent  C/b persistent septic shock c/b vasodilation and high outputs, on multiple antibiotics, procalcitonin persistently elevated  C/b R SUPA occlusion with small area of matched perfusion defect - subacute infarct c/b left sided hemiparesis  C/b pancreatitis  C/b failure to extubate x 2; anticipating tracheostomy this week  Coronary artery disease  CAD s/p CABG x 2: further disease best managed medically due to small vessel size   At risk of sudden cardiac death  Peripheral arterial disease  Bilateral hydronephrosis s/p andrzej  Cardiac risk factors:  HTN  HL  TRISTAN, STOP-BANG 4  DM2  CKD, stage 3  MOCA from 2/9 19/30, consistent with mild cognitive impairment  Hard of hearing  Gastric Neuroendocrine Tumor, elevated gastrin and chromogranin A  Septic shock, improving   Left sided weakness noted night 3/1. +SAH and subacute occlusion distal R cerebral artery         LIFE GOALS: not readdressed today   Patient's personal goals included: having a few more years with family  Important upcoming milestones or family events: None at this time   The patient identified the following as important for living well: being at home, not being SOB            CXR (3/5/23) mild pulmonary edema. Small pleural effusions and bibasilar airspace opacities. Head CTA (3/2/23) Occlusion distal right anterior cerebral artery, with associated small area of matched perfusion defect and cortical enhancement, consistent with subacute infarct. Diminutive and irregular right vertebral artery, occluded at the V3-4 junction, age indeterminate extensive multifocal atherosclerosis in the intracranial and extracranial circulation. CARDIAC IMAGING:   Echo (3/13/23)    Left Ventricle: Severely reduced left ventricular systolic function with a visually estimated EF of 25 - 30%. Left ventricle is dilated. Normal wall thickness. Unable to assess wall motion. Right Ventricle: Moderately reduced systolic function. TAPSE is abnormal. TAPSE is 1.1 cm. LVEDD 5.3cm    ECHO- 3/6/23       Left Ventricle: Severely reduced left ventricular systolic function with a visually estimated EF of 15 - 20%. Left ventricle is moderately dilated. LVAD is present. Right Ventricle: Right ventricle is moderately dilated. Mildly reduced systolic function.         Echo 2/19/23    Left Ventricle: Severely reduced left ventricular systolic function with a visually estimated EF of 20 - 25%. Not well visualized. Left ventricle size is normal. Increased wall thickness. Unable to assess wall motion. Abnormal diastolic function. LVAD is present. LVAD inflow cannula was not well visualized. Right Ventricle: Not well visualized. Right ventricle is mildly dilated. Moderately reduced systolic function. TAPSE is abnormal.    Mitral Valve: Severe annular calcification at the anterior and posterior leaflets of the mitral valve. Mild regurgitation. Left Atrium: Left atrium is dilated. Right Atrium: Right atrium is dilated. Technical qualifiers: Echo study was technically difficult and technically difficult with poor endocardial visualization. Echo 1/27/23    Left Ventricle: EF by visual approximation is 20%. Left ventricle is mildly dilated. Mitral Valve: Moderately thickened leaflet, at the anterior and posterior leaflets. Moderately calcified leaflet. Moderate regurgitation. Left Atrium: Left atrium is moderately dilated. Technical qualifiers: Echo study was technically difficult with poor endocardial visualization. Contrast used: Definity. Limited study, not all structures viewed     Echo 1/9/23   Left Ventricle: Severely reduced left ventricular systolic function with a visually estimated EF of 25 - 30%. Left ventricle size is normal. Mildly increased wall thickness. Echo 12/26/22    Left Ventricle: Severely reduced left ventricular systolic function with a visually estimated EF of 25 - 30%. Left ventricle size is normal. Normal wall thickness. There are regional wall motion abnormalities. Grade II diastolic dysfunction with increased LAP. Right Ventricle: Moderately reduced systolic function. TAPSE is abnormal. TAPSE is 1.1 cm. Aortic Valve: Mild stenosis of the aortic valve. AV peak gradient is 13 mmHg. AV peak velocity is 1.8 m/s. Mitral Valve: Not well visualized.  Moderate annular calcification at the posterior leaflet of the mitral valve. Mild to moderate regurgitation. Tricuspid Valve: Mildly elevated RVSP. Left Atrium: Left atrium is moderately dilated. 12/8/22    Left Ventricle: Moderately reduced left ventricular systolic function with a visually estimated EF of 35 - 40%. Severe hypokinesis of the following segments: mid anteroseptal, apical anterior, apical septal, apical inferior and apical lateral. Severe hypokinesis of the apex. Mitral Valve: Severely thickened leaflet, at the anterior and posterior leaflets. Severely calcified leaflet, at the anterior and posterior leaflets. Mild annular calcification of the mitral valve. Moderate regurgitation. Left Atrium: Left atrium is mildly dilated. Contrast used: Definity. limited study     EKG 12/22/22 ST, Biatria enlargement, marked ST abnormality     LHC 12/6/22  1. Normal LVEDP  2. Severe native multivessel coronary artery disease  3. Patent LIMA to LAD and vein graft to distal RCA  4. Recurrent ISR in OM1 stent with now 60 to 70% restenosis  5. Recoil of left main and circumflex stent with now recurrent 40 to 50% stenosis. 6.  Progression of ostial left main disease now to about 60% stenosis  7. Progression of disease in jailed first marginal branch now with diffuse 90% stenosis  8.   High-grade stenosis in the mid to distal right potential femoral artery treated with 6 x 40 mm impact drug-coated balloon angioplasty to reduce the stenosis to less than 40%        HEMODYNAMICS:  RHC 1/9/23  PA 20/9, RA 3, PCWP 8, CI 1.8     CPEST too ill   6MW 300 feet    LVAD INTERROGATION:  Device interrogated in person  Device function normal, normal flow, no events  LVAD   Pump Speed (RPM): 4600  Pump Flow (LPM): 3.3  MAP: 80  PI (Pulsitility Index): 4.5  Power: 2.9   Test: Yes  Back Up  at Bedside & Labeled: Yes  Power Module Test: No  Driveline Site Care: No  Driveline Dressing: Clean, Dry, and Intact  Testing  Alarms Reviewed: Yes  Back up SC speed: 4600  Back up Low Speed Limit: 4200  Emergency Equipment with Patient?: Yes  Emergency procedures reviewed?: Yes  Drive line site inspected?: Yes  Drive line intergrity inspected?: Yes  Drive line dressing changed?: No    PHYSICAL EXAM:  Visit Vitals  BP (!) 110/58   Pulse (!) 109   Temp 98.2 °F (36.8 °C)   Resp 22   Ht 5' 6\" (1.676 m)   Wt 154 lb 5.2 oz (70 kg)   SpO2 100%   BMI 24.91 kg/m²     General: Patient is well developed, well-nourished in no acute distress  HEENT: Unremarkable   Neck: Supple. No evidence of thyroid enlargements or lymphadenopathy. JVD: Cannot be appreciated   Lungs: Breath sounds are equal and clear bilaterally. No wheezes, rhonchi, or rales. Heart: Regular rate and rhythm with normal S1 and S2. No murmurs, gallops or rubs. Abdomen: Soft, no mass or tenderness. No organomegaly or hernia. Bowel sounds present. Genitourinary and rectal: deferred  Extremities: No cyanosis, clubbing, or edema. Neurologic: No focal sensory or motor deficits are noted. Grossly intact. Psychiatric: Awake, alert an doriented x 3. Appropriate mood and affect. Skin: Warm, dry and well perfused. REVIEW OF SYSTEMS:  General: Denies fever. Ear, nose and throat: Denies difficulty hearing, sinus problems, nosebleeds  Cardiovascular: see above in the interval history  Respiratory: Denies cough, wheezing, sputum production, hemoptysis.   Gastrointestinal: Denies heartburn, constipation, diarrhea, abdominal pain, nausea, blood in stool  Kidney and bladder: Denies painful urination, frequent urination  Musculoskeletal: Denies joint pain, muscle weakness  Skin and hair: Denies change in existing skin lesions    PAST MEDICAL HISTORY:  Past Medical History:   Diagnosis Date    CAD (coronary artery disease) 11/10/2016    NSTEMI & 2 stents    Deafness 10/28/2012    DM (diabetes mellitus) ([facility] [address]     Elevated cholesterol     Hypertension     NSTEMI (non-ST elevated myocardial infarction) ([facility] [address] 11/10/2016       PAST SURGICAL HISTORY:  Past Surgical History:   Procedure Laterality Date    COLONOSCOPY N/A 6/28/2018    COLONOSCOPY performed by Steffanie Mcclure MD at [facility] ENDOSCOPY    COLONOSCOPY N/A 1/18/2023    COLONOSCOPY performed by Porsha Lebron MD at [facility] ENDOSCOPY    [address]  11/11/2016    2 stents       FAMILY HISTORY:  Family History   Problem Relation Age of Onset    Heart Disease Father     Heart Attack Father     Hypertension Mother     Elevated Lipids Brother     Elevated Lipids Brother     No Known Problems Sister     Elevated Lipids Brother     No Known Problems Son     No Known Problems Daughter     Anesth Problems Neg Hx        SOCIAL HISTORY:  Social History     Socioeconomic History    Marital status:    Tobacco Use    Smoking status: Never     Passive exposure: Never    Smokeless tobacco: Never   Vaping Use    Vaping Use: Never used   Substance and Sexual Activity    Alcohol use: Yes     Alcohol/week: 2.0 standard drinks     Types: 1 Cans of beer, 1 Shots of liquor per week     Comment: rarely    Drug use: No    Sexual activity: Yes     Social Determinants of Health     Financial Resource Strain: Medium Risk    Difficulty of Paying Living Expenses: Somewhat hard   Food Insecurity: Food Insecurity Present    Worried About Running Out of Food in the Last Year: Never true    Ran Out of Food in the Last Year: Often true       LABORATORY RESULTS:     Labs Latest Ref Rng & Units 3/13/2023 3/12/2023 3/11/2023 3/11/2023 3/10/2023 3/9/2023 3/8/2023   WBC 4.1 - 11.1 K/uL 8.1 7.1 - 8.1 11. 4(H) 11.0 -   RBC 4.10 - 5.70 M/uL 2.64(L) 2.50(L) - 2.26(L) 2.35(L) 2.53(L) -   Hemoglobin 12.1 - 17.0 g/dL 8.4(L) 8. 1(L) 9.0(L) 7. 2(L) 7. 6(L) 7. 9(L) 8.0(L)   Hematocrit 36.6 - 50.3 % 26. 4(L) 25. 2(L) 28. 4(L) 23. 4(L) 23. 5(L) 25. 7(L) 25. 5(L)   MCV 80.0 - 99.0 . 0(H) 100. 8(H) - 103. 5(H) 100. 0(H) 101. 6(H) -   Platelets 545 - 400 K/uL 260 239 - 261 337 358 -   Lymphocytes 12 - 49 % 8(L) 8(L) - 7(L) 6(L) 11(L) -   Monocytes 5 - 13 % 14(H) 13 - 12 13 12 -   Eosinophils 0 - 7 % 3 3 - 2 0 0 -   Basophils 0 - 1 % 0 0 - 0 0 1 -   Albumin 3.5 - 5.0 g/dL 3.5 4.0 - 3.9 4.0 4.0 -   Calcium 8.5 - 10.1 MG/DL 12. 1(H) 10. 6(H) - 11. 6(H) 11. 6(H) 11. 9(H) -   Glucose 65 - 100 mg/dL 205(H) 200(H) - 215(H) 226(H) 126(H) -   BUN 6 - 20 MG/DL 73(H) 52(H) - 69(H) 44(H) 56(H) -   Creatinine 0.70 - 1.30 MG/DL 4.28(H) 3.17(H) - 2.51(H) 2.88(H) 3.53(H) -   Sodium 136 - 145 mmol/L 138 138 - 139 137 137 -   Potassium 3.5 - 5.1 mmol/L 3.9 3.8 - 3.8 4.1 5.0 -   TSH 0.36 - 3.74 uIU/mL - - - - - - -   PSA 0.01 - 4.0 ng/mL - - - - - - -   LDH 85 - 241 U/L 256(H) 272(H) - 291(H) 342(H) 357(H) -   Some recent data might be hidden     Lab Results   Component Value Date/Time    TSH 3.52 01/28/2023 05:26 AM    TSH 2.12 12/27/2022 02:36 PM    TSH 4.80 (H) 12/06/2022 03:53 AM    TSH 5.39 (H) 10/12/2022 09:10 AM    TSH 3.53 02/03/2022 11:47 AM    TSH 5.790 (H) 11/21/2019 04:45 PM    TSH 3.08 06/22/2018 01:53 PM    TSH 4.250 05/26/2015 09:43 AM       ALLERGY:  Allergies   Allergen Reactions    Ambien [Zolpidem] Other (comments)     Causes extreme confusion        CURRENT MEDICATIONS:    Current Facility-Administered Medications:     insulin NPH (NOVOLIN N, HUMULIN N) injection 10 Units, 10 Units, SubCUTAneous, Q12H, Cathy Sheldon, CNS, 10 Units at 03/13/23 0930    albumin human 25% (BUMINATE) solution 12.5 g, 12.5 g, IntraVENous, Q12H, Lizette Linton, NP, 12.5 g at 03/13/23 0930    oxyCODONE IR (ROXICODONE) tablet 5 mg, 5 mg, Oral, Q4H PRN, Vitaliy Gaines,     oxyCODONE IR (ROXICODONE) tablet 10 mg, 10 mg, Oral, Q4H PRN, Vitaliy Gaines, DO    pantoprazole (PROTONIX) 40 mg in 0.9% sodium chloride 10 mL injection, 40 mg, IntraVENous, DAILY, Walker Grier MD, 40 mg at 03/13/23 0929    cinacalcet (SENSIPAR) tablet 60 mg, 60 mg, Oral, DAILY WITH BREAKFAST, Yannick Beach MD, 60 mg at 03/13/23 9687    labetaloL (NORMODYNE;TRANDATE) injection 5 mg, 5 mg, IntraVENous, Q4H PRN, Tammy MOLINA MD, 5 mg at 03/09/23 0307    [Held by provider] heparin 25,000 units in D5W 250 ml infusion, 400 Units/hr, IntraVENous, CONTINUOUS, Lizette Linton NP, Stopped at 03/10/23 1350    levETIRAcetam (KEPPRA) injection 250 mg, 250 mg, IntraVENous, Once per day on Tue Thu Sat, Jose Manuel Tracy MD, 250 mg at 03/11/23 2114    0.9% sodium chloride infusion, 14 mL/hr, IntraVENous, CONTINUOUS, Tammy MOLINA MD, Last Rate: 14 mL/hr at 03/09/23 0757, 14 mL/hr at 03/09/23 0757    levETIRAcetam (KEPPRA) injection 500 mg, 500 mg, IntraVENous, DAILY, Walker Segal MD, 500 mg at 03/13/23 0930    albumin human 25% (BUMINATE) solution 25 g, 25 g, IntraVENous, DIALYSIS PRN, Yannick Beach MD, 25 g at 03/13/23 0539    insulin lispro (HUMALOG) injection, , SubCUTAneous, Q6H, Tammy MOLINA MD, 2 Units at 03/13/23 1241    hydrALAZINE (APRESOLINE) 20 mg/mL injection 5 mg, 5 mg, IntraVENous, Q6H PRN, Adriana Broussard MD, 5 mg at 03/10/23 1316    hydrALAZINE (APRESOLINE) tablet 5 mg, 5 mg, Oral, PRN, Adriana Broussard MD    aspirin chewable tablet 81 mg, 81 mg, Per NG tube, Beryle Sames, Elle Bell MD, 81 mg at 03/13/23 0930    epoetin heidy-epbx (RETACRIT) injection 10,000 Units, 10,000 Units, SubCUTAneous, Q TUE, THU & SAT, Abou-Assi, Tori Aguilar MD, 10,000 Units at 03/11/23 2119    EPINEPHrine (ADRENALIN) 10 mg in 0.9% sodium chloride 250 mL infusion, 0-10 mcg/min, IntraVENous, TITRATE, FiserEleonora MD, Stopped at 03/05/23 0920    dextrose (D50W) injection syrg 25 g, 25 g, IntraVENous, PRN, Mounika Rodríguez MD, 9 mL at 02/25/23 1225    neomycin-polymyxin-dexamethasone (Génesis Bis) 3.5 mg/g-10,000 unit/g-0.1 % ophthalmic ointment, , Both Eyes, BID, Johana Conway MD, Given at 03/13/23 0932    NOREPINephrine (LEVOPHED) 8 mg in 5% dextrose 250mL (32 mcg/mL) infusion, 0.5-16 mcg/min, IntraVENous, TITRATE, Shawna Johnson MD, Last Rate: 3.8 mL/hr at 03/13/23 1154, 2 mcg/min at 03/13/23 1154    colchicine tablet 0.6 mg, 0.6 mg, Oral, DAILY, Shaila, Lizette B, NP, 0.6 mg at 03/13/23 0930    heparin (porcine) 1,000 unit/mL injection 1,700 Units, 1,700 Units, InterCATHeter, DIALYSIS PRN, 1,700 Units at 03/13/23 0819 **AND** heparin (porcine) 1,000 unit/mL injection 1,500 Units, 1,500 Units, InterCATHeter, DIALYSIS PRN, Tiesha Lopez DO, 1,500 Units at 03/13/23 0819    balsam peru-castor oiL (VENELEX) ointment, , Topical, BID, Parker Arzola MD, Given at 03/13/23 0931    acetaminophen (TYLENOL) solution 650 mg, 650 mg, Oral, Q4H PRN, Jaleesa Guo MD, 650 mg at 02/20/23 0401    acetaminophen (TYLENOL) suppository 650 mg, 650 mg, Rectal, Q4H PRN, Jaleesa Guo MD, 650 mg at 02/17/23 2013    HYDROmorphone (DILAUDID) injection 0.5 mg, 0.5 mg, IntraVENous, Q3H PRN, Radha MOLINA MD, 0.5 mg at 03/09/23 1225    HYDROmorphone (DILAUDID) injection 1 mg, 1 mg, IntraVENous, Q4H PRN, Radha MOLINA MD, 1 mg at 03/09/23 0557    [Held by provider] heparin 25,000 units in D5W 250 ml infusion, 12-25 Units/kg/hr, IntraVENous, TITRATE, Jerry Gross MD, Stopped at 03/08/23 0515    glucose chewable tablet 16 g, 4 Tablet, Oral, PRN, Shaila, Lizette B, NP    glucagon (GLUCAGEN) injection 1 mg, 1 mg, IntraMUSCular, PRN, Shaila, Lizetet B, NP    sodium chloride (NS) flush 5-40 mL, 5-40 mL, IntraVENous, Q8H, Shaila, Lizette B, NP, 10 mL at 03/13/23 3220    sodium chloride (NS) flush 5-40 mL, 5-40 mL, IntraVENous, PRN, Shaila, Lizette B, NP, 40 mL at 02/17/23 1818    naloxone (NARCAN) injection 0.4 mg, 0.4 mg, IntraVENous, PRN, Shaila, Lizette B, NP    ELECTROLYTE REPLACEMENT NOTE: Nurse to review Serum Potassium and Magnesuim levels and Initiate Electrolyte Replacement Protocol as needed, 1 Each, Other, PRN, Shaila, Lizette B, NP    dextrose 10 % infusion 0-250 mL, 0-250 mL, IntraVENous, PRN, Shaila, Ilzette B, NP, Last Rate: 150 mL/hr at 02/24/23 0220, 75 mL at 02/24/23 0220    acetaminophen (TYLENOL) tablet 650 mg, 650 mg, Oral, Q6H PRN, Shaila, Lizette B, NP, 650 mg at 02/17/23 0200    ondansetron (ZOFRAN) injection 4 mg, 4 mg, IntraVENous, Q4H PRN, Shaila, Lizette B, NP, 4 mg at 03/09/23 1212    albuterol-ipratropium (DUO-NEB) 2.5 MG-0.5 MG/3 ML, 3 mL, Nebulization, Q6H PRN, Shaila, Lizette B, NP    senna-docusate (PERICOLACE) 8.6-50 mg per tablet 1 Tablet, 1 Tablet, Oral, DAILY, Shaila, Lizette B, NP, 1 Tablet at 03/11/23 0822    polyethylene glycol (MIRALAX) packet 17 g, 17 g, Oral, DAILY, Shaila, Lizette B, NP, 17 g at 03/03/23 0856    bisacodyL (DULCOLAX) suppository 10 mg, 10 mg, Rectal, DAILY PRN, Shaila, Lizette B, NP, 10 mg at 02/22/23 0839    0.45% sodium chloride infusion, 10 mL/hr, IntraVENous, CONTINUOUS, Leighton Carlos MD, Stopped at 03/10/23 1707    dexmedeTOMidine in 0.9 % NaCl (PRECEDEX) 400 mcg/100 mL (4 mcg/mL) infusion soln, 0.1-1.5 mcg/kg/hr, IntraVENous, TITRATE, Romel Johnson MD, Stopped at 03/08/23 2300    PATIENT CARE TEAM:  Patient Care Team:  Mason Palomino MD as PCP - General (Family Medicine)  Mason Palomino MD as PCP - [facility]  Leopoldo Cox MD (Cardiovascular Disease Physician)  Harry Teague MD (Gastroenterology)  Jesus Blue MD (Cardiothoracic Surgery)  Omar Guillory MD (Cardiovascular Disease Physician)  Yoselin Olvera MD (Nephrology)  Flower Stern MD (Pulmonary Disease)  Leighton Carlos MD ([address] Vascular Surgery)     Thank you for allowing me to participate in this patient's care.     Dangelo Greer MD   [address] [place] [address], [address]  Phone: [phone]    Critically ill  Critical care 40min

## 2023-03-13 NOTE — PROGRESS NOTES
Comprehensive Nutrition Assessment    Type and Reason for Visit: Reassess    Nutrition Recommendations/Plan:     Continue TF of Vital 1.5 @ 40 ml/hr with 1 packet Prosource TID and flushes of 30 mL H2O q 4 hours    If concerns for feeding with pancreatitis, consider advancing DHT / replacing with longer tube to feed past ligament of treitz. Pt would not be a good candidate for TPN given liver fx. Current formula remains most appropriate for overall GI tolerance, elevated lipase, elevated t. Bili and LFTs         Malnutrition Assessment:  Malnutrition Status: Moderate malnutrition (03/09/23 1427)    Context:  Acute illness     Findings of the 6 clinical characteristics of malnutrition:   Energy Intake:  Mild decrease in energy intake (specify) (TF off/ at trickle for past 48 hours)  Weight Loss:  Unable to assess - d/t fluid    Body Fat Loss:  Mild body fat loss, Buccal region   Muscle Mass Loss: Moderate muscle mass loss, Clavicles (pectoralis & deltoids), Temples (temporalis), Thigh (quadriceps)  Fluid Accumulation:  Mild, Genitals, Extremities, Generalized   Strength:  Not performed        Nutrition Assessment:    PMHx: CAD s/p PCI x 2, HFrEF with EF 25-30%, DM, HTN, hypercholesterolemia, Nstemi, CKD stage III. Admitted 1/26 d/t ongoing symptoms r/t his HFrEF and LVAD work-up. Tx to CVICU on 2/3 for [facility] placement and ongoing LVAD workup. Noted, as part of LVAD work-up PTA, pt underwent EGD with bx on 1/18/23 which path revealed well-differentiated neuroendocrine tumor. Oncology consulted, no absolute contraindications to LVAD. Nephrology following for TANNER on CKD 3. LVAD placed 2/15. Pt extubated 2/17 but then had to be re-intubated 2/18 for placement of RVAD 2/18. Had plans to start CVVHD 2/17 however pt too unstable ON; started post RVAD (Impella RP support device). CRRT started 2/18. Tube feeding initiated 2/19.  Liver failure with elevated T-bili (hemolysis and/or ischemic vs congestive hepatopathy). Impella removed 3/1. Code neuro on 3/1 and 3/2, SAH noted on CT. Unable to obtain MRI d/t LVAD. AC stopped. Extubated to BiPAP on 3/2, but re-intubated same day. Elevated lipase 3/5 and climbing - ?pancreatitis, possibly 2/2 hypercalcemia. Hypercalcemia - s/p Calcitonin, Sensipar increased. CRRT stopped 3/5 - transitioned to iHD on 3/6. Trach placed 3/7. SLP following for in-line PMV trial when appropriate, but mentation precludes trials at this time. SAH persistent on head CT.      3/13:  TF back at goal.  BG more elevated, but pt was only receiving NPH 5 units BID as it was cut back last week when TF rate was decreased. However, now that back at goal rate, previously NPH of 10 units BID is being resumed. Also noted for more loose stools. Lipase up further, however pancreas and CBD unable to be assessed on ABD CT on 3/10 as it was obscured by bowel gas; abd CT with evidence of chronic hepatic congestion and possible cirrhosis. LFTs and T bili up further. Hepatology consulted. Impression: doubts cirrhosis, suspects the change in liver morphology on US that led radiologist to suggest cirrhosis is present is simply d/t severe passive congestion and swelling of the liver and suspects will improve if pump improves. Notes that continued severe biventricualr failure can result in cirrhosis over time. Family meetings scheduled weekly. Unclear if pancreatitis has been r/o since technically it was not visualized. I do not see that GI has been consulted and not sure if would be of benefit. Stools are looser than documented last week, ?if has any pancreatic insufficiency. Already on higher MCT tube feeds. Spoke with LORI Fofana - states they believe everything is r/t heart and no overt GI intolerance concerns. Pt has not had any further vomiting. Has loose stools, but states they are manageable.     Weight relatively stable since last week, still up from euvolemic wt with edema noted below. Wife was present at bedside today. No questions. 3/9: TF held 3/8 for N/V yesterday, resumed today (3/9) @ trickle - 20 mL/hr. Per RN, pt was tolerating at goal up until trach and yesterday pt was vomiting blood that was from trach site. Has done well with trickle feeds so far today and giving Zofran prophylactically - pt did tolerate a packet of Prosource today. T bili, LFTs, lipase all trending up - plans to monitor. +jaundice. RN states this is the first she is reading about the pancreatitis - it hasn't been brought up as a major concern in regards to nutrition. Respiratory rate too high for in-line PMV. HD today - received 3/6, 3/7, none 3/8. Off pressors, antimicrobials, sedation. Insulin adjustments per Diabetes with reduced TF. Pt would not be a good candidate for TPN given worsening liver fx. Hopefully will be able to tolerate increases in tube feeds soon. Weight up d/t fluid. Updated malnutrition assessment - continues to meet moderate PCM criteria, but worsening. Difficult to assess fat loss and wt loss with edema present, so could very well meet severe PCM criteria. Will continue to monitor. TF at goal of Vital 1.5 @ 40 mL/hr with 1 packet Prosource TID and flushes of 30 mL h2o q 4 hours provides 880 mL, 1500 kcal, 104 gm pro, 165 gm CHO, 669+270+005=5332 mL free H2o. This meets 100% kcal needs and 95% pro needs respectively. 3/6: follow-up. Events noted above. Failed extubation and now discussions re: trach. Off pressors and propofol. Precedex for sedation. Elevated lipase 3/5, ? pancreatitis. Already on more appropriate formula with higher MCT ratio. Continues on TF at goal without overt signs of intolerance. DHT replaced on 3/1 - imaging 3/5 reveals tip in duodenal bulb. Vital 1.5 @ 40 mL/hr with 1 packet Prosource TID and flushes of 30 mL H2o q 4 hours providing 880 mL, 1500 kcal, 104 gm pro, 165 gm CHO, and 669+270+201=5496 mL free H2o.   Weight trending up, currently 3 L net positive. 1-2+ pitting edema. BG lower normal - insulin reduced. 3/1: pt remains intubated, on CRRT. Impella removed today. Propofol was resumed on 2/28, but only running @ 5.9 mL/hr which provides 156 kcal.  TF via NG of Vital 1.5 @ 40 mL/hr with 1 packet Prosource TID and flushes of 30 mL h2o q 4 hours providing 880 mL, 1500 kcal, 104 gm pro, 165 gm CHO, 669+270+484=9187 mL free H2o. With propofol = 1656 kcal which meets 104% kcal needs. Off levo, remains on epi. Insulin gtt off - now on NPH BID. BG within target range. Low K+ and Phos, repletion ordered. Having soft BMs. Given malnutrition and overall more stable/ less pressors, current TF regimen remains appropriate. 2/27: follow-up. Remains vented on CRRT. Tolerating TF at goal.  Having looses BM 2-3x/day, but nothing excessive per RN. Propofol was at 3.9 mL/hr (103 kcal), but currently off and doing well. Vital 1.5 @ 35 ml/hr with 3 packets Prosource daily; minimal water flush of 30 ml q 4 hr providing 770 ml, 1320 calories, 96 gm protein, 42 gm fat, 43 mEq potassium and 940 ml free water. K+ WNL, Mg high, Phos low - improved since yesterday. BG well controlled, remains on insulin drip, but plans to adjust to basal insulin soon. Given pt is more stable and with malnutrition, recommend slightly increasing TF to better meet est needs without propofol running. Even if resumed at previous rate, would still be within kcal range/ not significant overfeeding. Will slightly increase TF to Vital 1.5 @ 40 mL/hr with 1 packet Prosource TID and flushes of 30 mL h2o q 4 hours provides 880 mL, 1500 kcal, 104 gm pro, 165 gm CHO, 669+270+536=2721 mL free H2o. This meets 100% kcal needs and 95% pro needs respectively. 2/20: Pt extubated 2/17 but then had to be re-intubated 2/18 for placement of RVAD 2/18. Plan to start CVVHD 2/17 however pt too unstable ON; started post RVAD (Impella RP support device).  Tube feeding ordered yesterday; RD consult placed. Liver failure with elevated T-bili (hemolysis and/or ischemic vs congestive hepatopathy). Mr Sylvia Landry has tolerated EN well thus far. Last BM however 2/14-bowel regimen is ordered. Recommend increasing Pericolace to bid since pt requiring a fair amount of Fentanyl. Will change formula to Vital 1.5 to avoid fiber and fat content (structured lipid 2/2 elevated bilirubin). New goal: Vital 1.5 @ 35 ml/hr with 3 packets Prosource daily; continue minimal water flush of 30 ml q 4 hr. This will provide 770 ml, 1320 calories, 96 gm protein, 42 gm fat, 43 mEq potassium and 940 ml free water (tube feeding/flush) per day to meet 88% protein needs. Tube feeding and propofol will meet 100% estimated energy needs (1695 calories per day). 2/17: pt had LVAD placed 2/15 and remains intubated. Spoke with RN yesterday and again today - no plans to start TF, working on extubation now. RN states OG is too small for tube feeds anyway and worries it would clog, PO meds held. Pt would need a DHT. Even if pt extubated, will be a slow progression of diet over weekend. DHT may need to be considered, but also with Carafate QID, tube feeds would need to be held at various points throughout the day which makes regimen more complicated, so would consider intermittent vs nocturnal feeds if needed. PO intake was improving pre-op, but overall still with significant wt loss and meeting moderate malnutrition status prior to surgery. Weight up as expected post-op (d/t fluid). Currently 150 lb, but was 123 lb on 2/13 standing scale when last seen by this service. Ve Observed 8.68 l/min, tMax:102.4 °F  [place] EEN ~1800 kcal, which is similar to current EEN. Therefore, will not adjust given likelihood of extubation soon. If TF needed, recommend 4 cartons of Nepro - giving 1 carton over 1 hour QID that are timed around the Carafate.     4 cartons of Nepro daily with 60 mL H2O flushes 4x/day provide 948 mL, 1706 kcal, 77 gm pro, 153 gm CHO, and 692+164=567 mL free H2O. Nutritionally Significant Medications:  Buminate, Sensipar, colchicine, epo, SSI, NPH 10 units BID (increased from 5 units BID this AM, levo @ 1, protonix, pericolace (held), miralax (held)  PRN: dilaudid, oxycodone, zofran      Estimated Daily Nutrient Needs:  Energy Requirements Based On: Formula  Weight Used for Energy Requirements: Admission (54.4 kg)  Energy (kcal/day): 4475-7554 (25-30 kcal/kg; ~1500 kcal/day using [id number])  Weight Used for Protein Requirements: Admission  Protein (g/day): 110 (2 gm/kg)  Method Used for Fluid Requirements: Standard renal  Fluid (ml/day): 500 mL + OP    Nutrition Related Findings:   Edema: Genital: 1+; Non-pitting (3/13/2023  2:00 PM)  Facial: Scleral (3/12/2023  8:00 PM)  Generalized: 1+; Non-pitting (3/13/2023  2:00 PM)  LLE: 1+; Non-pitting (3/13/2023  2:00 PM)  LUE: 1+; Non-pitting (3/13/2023  2:00 PM)  RLE: 1+; Non-pitting (3/13/2023  2:00 PM)  RUE: 1+; Non-pitting (3/13/2023  2:00 PM)    Last BM: 03/13/23, Loose    Wounds: Multiple, Deep tissue injury, Unstageable, Surgical incision  Per WOCN 3/6-     1. POA Unstageable Pressure Injury  Dry, stable eschar - unchanged  Betadine in use. 2. Left Heel Deep Tissue Injury  Blood-filled bullae - unchanged     3. Sacral deep tissue injury  ~5 x 4 cm with irregular margins  No blistering or induration  Venelex and foam applied    Current Nutrition Therapies:  Diet: NPO  Nutrition Support: TF via DHT of Vital 1.5 @ 20 mL/hr with 30 mL h2o flushes q 4 hours. Goal is 40 mL/hr with 3 packets Prosource daily. Anthropometric Measures:  Height: 5' 6\" (167.6 cm)  Ideal Body Weight (IBW): 142 lbs (65 kg)  Admission Body Weight: 120 lb  Current Body Wt:  70 kg (154 lb 5.2 oz), 108.7 % IBW.  Bed scale  Current BMI (kg/m2): 24.9        Weight Adjustment: No adjustment                 BMI Category: Underweight (BMI less than 22) age over 65    Last 3 Recorded Weights in this Encounter    03/07/23 0537 03/08/23 0200 03/09/23 0323   Weight: 66.1 kg (145 lb 11.6 oz) 67.6 kg (149 lb 0.5 oz) 70.7 kg (155 lb 13.8 oz)     Last 3 Recorded Weights in this Encounter    03/04/23 0700 03/05/23 0700 03/06/23 0218   Weight: 62.3 kg (137 lb 5.6 oz) 62.9 kg (138 lb 10.7 oz) 64.5 kg (142 lb 3.2 oz)     Last 3 Recorded Weights in this Encounter    02/25/23 0619 02/26/23 0431 02/27/23 0430   Weight: 67.7 kg (149 lb 4 oz) 65.4 kg (144 lb 2.9 oz) 62.2 kg (137 lb 2 oz)     Wt Readings from Last 10 Encounters:   02/27/23 62.2 kg (137 lb 2 oz)   01/25/23 55.8 kg (123 lb)   01/23/23 54.9 kg (121 lb)   01/21/23 54.4 kg (120 lb)   01/20/23 52.2 kg (115 lb)   01/20/23 52.2 kg (115 lb)   01/19/23 53 kg (116 lb 13.5 oz)   12/19/22 58.5 kg (129 lb)   12/16/22 57.4 kg (126 lb 8.7 oz)   12/05/22 59 kg (130 lb)         Nutrition Diagnosis:   Inadequate oral intake related to impaired respiratory function, cardiac dysfunction as evidenced by intubation, nutrition support-enteral nutrition  Increased nutrient needs related to increased demand for energy/nutrients, renal dysfunction as evidenced by dialysis, wounds  Inadequate enteral nutrition infusion related to altered GI function as evidenced by nausea, vomiting (TF off earlier this week, now trickle feeds)      Nutrition Interventions:   Food and/or Nutrient Delivery: Continue tube feeding  Nutrition Education/Counseling: No recommendations at this time  Coordination of Nutrition Care: Continue to monitor while inpatient       Goals:  Previous Goal Met: Progress towards goal(s) declining  Goals: other (specify) (to meet >90% of EEN over next 4-7 days)  Specify Other Goals: tolerate TF at goal rate to meet >90% of EEN  within 4-5 days    Nutrition Monitoring and Evaluation:   Behavioral-Environmental Outcomes: None identified  Food/Nutrient Intake Outcomes: Enteral nutrition intake/tolerance  Physical Signs/Symptoms Outcomes: Biochemical data, GI status, Fluid status or edema, Hemodynamic status, Nutrition focused physical findings, Weight, Skin    Discharge Planning: Too soon to determine    Recent Labs     03/13/23  0327 03/12/23  0506 03/11/23  0432   * 200* 215*   BUN 73* 52* 69*   CREA 4.28* 3.17* 2.51*    138 139   K 3.9 3.8 3.8    104 106   CO2 19* 22 18*   CA 12.1* 10.6* 11.6*   PHOS 4.7 3.1 4.6   MG 2.9* 2.6* 3.0*       Recent Labs     03/13/23  0327 03/12/23  0506 03/11/23  0432   * 110* 107*   * 187* 175*   * 269* 239*   TBILI 20.6* 19.4* 17.7*   TP 6.6 7.0 7.0   ALB 3.5 4.0 3.9   GLOB 3.1 3.0 3.1   LPSE >3,000*  --   --        Recent Labs     03/13/23  0327 03/12/23  0506   WBC 8.1 7.1   HGB 8.4* 8.1*   HCT 26.4* 25.2*    239       No results for input(s): PREALB in the last 72 hours.     Prealbumin   Date Value Ref Range Status   03/07/2023 13.1 (L) 20.0 - 40.0 mg/dL Final   03/05/2023 13.5 (L) 20.0 - 40.0 mg/dL Final   02/28/2023 11.6 (L) 20.0 - 40.0 mg/dL Final     Recent Labs     03/13/23  1234 03/13/23  0631 03/13/23  0008 03/12/23  1708 03/12/23  1148 03/12/23  0633 03/11/23  2316 03/11/23  1758 03/11/23  1210 03/11/23  0803 03/11/23  0531 03/11/23  0018 03/10/23  2101 03/10/23  1745   GLUCPOC 217* 176* 211* 212* 206* 210* 253* 181* 204* 239* 241* 217* 215* 177*       Lab Results   Component Value Date/Time    Hemoglobin A1c 7.7 (H) 01/11/2023 06:14 PM    Hemoglobin A1c 6.5 (H) 12/06/2022 03:53 AM    Hemoglobin A1c 7.0 (H) 10/12/2022 09:10 AM         Esther Martin RD   Available via [facility]

## 2023-03-13 NOTE — PROGRESS NOTES
Problem: Self Care Deficits Care Plan (Adult)  Goal: *Acute Goals and Plan of Care (Insert Text)  Description:   FUNCTIONAL STATUS PRIOR TO ADMISSION: Patient required minimal assistance for basic and instrumental ADLs. Used rollator for functional mobility, had HHA and HH OT/PT upon discharge. HOME SUPPORT: The patient lived with wife and family members. Occupational Therapy Goals  Initiated 1/30/2023; Goals remain the same, LVAD scheduled for 2/15  1. Patient will perform grooming with supervision/set-up within 7 day(s). 2.  Patient will perform upper body dressing with supervision/set-up within 7 day(s). 3.  Patient will perform lower body dressing with supervision/set-up within 7 day(s). 4.  Patient will perform all aspects of toileting with supervision/set-up within 7 day(s). 5.  Patient will utilize energy conservation techniques during functional activities with verbal cues within 7 day(s). Occupational Therapy Goals  Initiated 3/13/2023  1. Patient will perform ADLs standing 5 mins without fatigue or LOB with maximal assistance  within 7 day(s). 2.  Patient will perform lower body ADLs with maximal assistance  within 7 day(s). 3.  Patient will perform upper body ADLs with maximal assistance within 7 day(s). 4.  Patient will perform toilet transfers with maximal assistance within 7 day(s). 5.  Patient will perform all aspects of toileting with maximal assistance within 7 day(s). 6.  Patient will participate in cardiac/sternal upper extremity therapeutic exercise/activities to increase independence with ADLs with maximal assistance for 5 minutes within 7 day(s). 7.  Patient will perform VAD switchover routine as part of ADL routine (including batteries<>batteries, battery clip<>battery clip, mock SC<>SC) with maximal assistance within 7 days. 8. Patient will participate in [address] [place] [email or web address] and/or [facility] when appropriate within 7 days.          3/13/2023 1408 by Naty Dumont, YAYA  Outcome: Progressing Towards Goal   OCCUPATIONAL THERAPY RE-EVALUATION  Patient: Moses Benson (76 y.o. male)  Date: 3/13/2023  Diagnosis: CHF (congestive heart failure) ([facility]) [I50.9] <principal problem not specified>  Procedure(s) (LRB):  LEFT GROIN RP IMPELLA INSERTION, KIARRA BY DR Abby Poole (Left) 23 Days Post-Op  Precautions: Fall (mindful movement)  Chart, occupational therapy assessment, plan of care, and goals were reviewed. ASSESSMENT  Based on the objective data described below, patient presents with increased lethargy, poor command following, generalized weakness, limited head control, decreased endurance requiring vent support, and decrease volitional engagement following LVAD POD #23. Hospital course complicated by R subarachnoid hemorrhage, R ventricular failure (R impella removed 3/1), failed extubation requiring trach placement, as well as renal failure requiring HD (previously CRRT). On this date patient received supine in bed on vent, with spouse at bedside. Patient able to attend t painful stimuli, observed pt with furrowed brow and reaching BUE reaching anteriorly with BLE movement/stretching by PT. Patient with no volitional activation of BUEs with verbal or tactile commands provided. Observed clonus in RLE and RUE (mainly in bicep region). Patient able to maintain eyes open inconsistently to painful stimuli, only able to maintain open for 3-5 seconds at a time holding gaze at midline unable to track toward R/L visual fields. Patient with no reaction to noxious stimuli in peripheral field (air puff, hand toward eye). Head resting with R sided lean, unable to maintain in midline upright positioning despite facilitation. MD and RN at bedside at conclusion of session. Recommend bed in chair position 30 min BID to encourage lung expansion, strengthening, and improved head control in prep for further EOB and OOB activities.  Plan to teach family PROM next session as appropriate    Current Level of Function Impacting Discharge (ADLs): total A     Other factors to consider for discharge: LVAD, SAH/CVA +         PLAN :  Recommendations and Planned Interventions: self care training, functional mobility training, therapeutic exercise, balance training, visual/perceptual training, therapeutic activities, cognitive retraining, endurance activities, neuromuscular re-education, patient education, home safety training, and family training/education    Frequency/Duration: Patient will be followed by occupational therapy 5 times a week to address goals. Recommendation for discharge: (in order for the patient to meet his/her long term goals)  To be determined: likely will require some level of rehab at [facility]    This discharge recommendation:  Has been made in collaboration with the attending provider and/or case management    Equipment recommendations for successful discharge (if) home: TBD       SUBJECTIVE:   Patient did not verbalize during session. OBJECTIVE DATA SUMMARY:   Hospital course since last seen and reason for reevaluation: LVAD POD #23, intubation, trach, R impella implant/explant, SAH    Cognitive/Behavioral Status:  Neurologic State: Eyes open to pain  Orientation Level: Unable to verbalize  Cognition: No command following  Perception: Cues to attend left visual field;Cues to attend right visual field         Hearing:   Auditory  Auditory Impairment: Hard of hearing, bilateral, Hearing aid(s)  Hearing Aids/Status: Left    Range of Motion:    AROM: Grossly decreased, non-functional  PROM: Grossly decreased, non-functional        Strength:    Strength: Grossly decreased, non-functional                Coordination:  Coordination: Grossly decreased, non-functional  Fine Motor Skills-Upper: Left Impaired;Right Impaired    Gross Motor Skills-Upper: Left Impaired;Right Impaired    Tone & Sensation:    Tone: Abnormal (mild clonus in RUE/RLE; decreased tone in LUE/LLE) Functional Mobility and Transfers for ADLs:  Bed Mobility:  Supine to Sit: Total assistance    Transfers:             Balance:  Sitting:  (NT 2/2 lethargy)  Standing:  (NT 2/2 lethargy)    ADL Assessment:  Feeding: Total assistance    Oral Facial Hygiene/Grooming: Total assistance    Bathing: Total assistance    Type of Bath: Chlorhexidine (CHG)    Upper Body Dressing: Total assistance    Lower Body Dressing: Total assistance    Toileting: Total assistance            Functional Measure:  [facility] AM-PAC®      Daily Activity Inpatient Short Form (6-Clicks) Version 2  How much HELP from another person do you currently need. .. (If the patient hasn't done an activity recently, how much help from another person do you think they would need if they tried?) Total A Lot A Little None   1. Putting on and taking off regular lower body clothing? [x]   1 []   2 []   3 []   4   2. Bathing (including washing, rinsing, drying)? [x]   1 []   2 []   3 []   4   3. Toileting, which includes using toilet, bedpan, or urinal? [x]   1 []   2 []   3 []   4   4. Putting on and taking off regular upper body clothing? [x]   1 []   2 []   3 []   4   5. Taking care of personal grooming such as brushing teeth? [x]   1 []   2 []   3 []   4   6. Eating meals? [x]   1 []   2 []   3 []   4     Raw Score: 6/24                            Cutoff score ?191,2,3 had higher odds of discharging home with home health or need of SNF/IPR    1. Kwabena Hernandez. Validity of the AM-PAC 6-Clicks Inpatient Daily Activity and Basic Mobility Short Forms. Physical Therapy Mar 2014, 94 3) [phone]; DOI: 10.2522/ptj.[id number]  2. Ronel Cain. Association of AM-PAC \"6-Clicks\" Basic Mobility and Daily Activity Scores With Discharge Destination. Phys Ther. 2021 Apr 4;101(4):cekb528. doi: 10.1093/ptj/odkc242. PMID: 88733788.   3. Jimmie ARMENTA, Deepthi CASANOVA, Sharrie Schaumann, Segundo MOE, George S. Activity Measure for Post-Acute Care \"6-Clicks\" Basic Mobility Scores Predict Discharge Destination After Acute Care Hospitalization in Select Patient Groups: A Retrospective, Observational Study. Arch Rehabil Res Clin Transl. 2022 Jul 16;4(3):255184. doi: 10.1016/j.arrct. [id number]. PMID: 35303486; PMCID: [id number]. 4. Eliseo Valdez, Shila BARRIGA. AM-PAC Short Forms Manual 4.0. Revised 2/2020. Pain:  Pt wincing/grimacing with pain. Activity Tolerance:   Poor    After treatment patient left in no apparent distress:   Supine in bed, Call bell within reach, Caregiver / family present, and Side rails x 3    COMMUNICATION/COLLABORATION:   The patients plan of care was discussed with: Physical therapist, Occupational therapist, Registered nurse, and Physician.      Naty Dumont OT  Time Calculation: 25 mins

## 2023-03-13 NOTE — WOUND CARE
WOCN Note:      Follow up for heels and sacrum. Admitted on 12/22/22 for sepsis, cardiogenic shock, respiratory failure. LVAD 2/15; Impella 2/19/23; CVA 3/1; SAH enlarged 3/2  History of MI, CABG x3, DM, CHF. Assessment:   Intubated, sedated. NG tube feeds. Vasquez  Assisted RN, Marisa Felty, to clean him of mushy stool. Resting on NARENDRA mattress with heels offloaded by pillows. 1. POA Unstageable Pressure Injury  Dry, stable eschar - unchanged  Betadine in use. 2. Left Heel Deep Tissue Injury  Blood-filled bullae now fully deflated and drying. Will likely flake away. Betadine in use    3. Sacral deep tissue injury  ~1.5 x 0.5 cm with irregular margins; smaller/improved  No blistering or induration  Venelex and foam applied    Wound Recommendations:    Sacrum: Apply Balsam peru-castor oil (Venelex) daily and cover with sacral foam   Bilateral heels: paint with betadine daily and allow to air dry; offload heels at all times. DO NOT apply Venelex to heels. PI Prevention:  Turn/reposition approximately every 2 hours  Offload heels with heels hanging off end of pillow at all times while in bed. Sacral Foam dressing: lift to assess regularly; change as needed. Discontinue if incontinence is frequently soiling dressing. Low Air Loss mattress: Use only flat sheet and one incontinence pad. No changes in condition to share with provider.      Transition of Care: Plan to follow weekly and as needed while admitted to hospital.      Tammy Williamson, [facility]

## 2023-03-13 NOTE — PROGRESS NOTES
SLP Contact Note    Attempted to see patient for in-line PMV as vent settings appropriate. Pt's mentation precludes in-line PMV trials at this time. Will hold for now.       Thank you,  YANDEL HoranEd, [id number] [facility]  Speech-Language Pathologist

## 2023-03-13 NOTE — ADVANCED PRACTICE NURSE
Hemodialysis / [phone]    Vitals Pre Post Assessment Pre Post   BP BP: 118/63 (03/13/23 0645) 110/58 LOC sedated sedated   HR Pulse (Heart Rate): 97 (03/13/23 0645) 93 Lungs coarse coarse   Resp Resp Rate: (!) 33 (03/13/23 0645)   18 Cardiac regular regular   Temp Temp: 98.9 °F (37.2 °C) (03/13/23 0525) 98 Skin warm warm   Weight Pre-Dialysis Weight: 70.1 kg (154 lb 8.7 oz) (03/13/23 0525)  Edema BUE BUE   Tele status bedside bedside Pain Pain Intensity 1: 0 (03/13/23 0525) No pain     Orders   Duration: Start: 0530 End: 0830 Total: 3 hr   Dialyzer: Dialyzer/Set Up Inspection: Haydee Jones ([id number]) (03/13/23 0525)   K Bath: Dialysate K (mEq/L): 2 (03/13/23 0525)   Ca Bath: Dialysate CA (mEq/L): 2.0 (03/13/23 0525)   Na: Dialysate NA (mEq/L): 140 (03/13/23 0525)   Bicarb: Dialysate HCO3 (mEq/L): 30 (03/13/23 0525)   Target Fluid Removal: Goal/Amount of Fluid to Remove (mL): 2000 mL (03/13/23 0525)     Access   Type & Location: LIJ cath   Comments:               in place, patent, dressing dry and intact, no S/S of infection                         Labs   HBsAg (Antigen) / date:     02/18/23 Negative                                          HBsAb (Antibody) / [place] [facility] [place]    Source: [facility]   Obtained/Reviewed  Critical Results Called HGB   Date Value Ref Range Status   03/13/2023 8.4 (L) 12.1 - 17.0 g/dL Final     Potassium   Date Value Ref Range Status   03/13/2023 3.9 3.5 - 5.1 mmol/L Final     Calcium   Date Value Ref Range Status   03/13/2023 12.1 (H) 8.5 - 10.1 MG/DL Final     BUN   Date Value Ref Range Status   03/13/2023 73 (H) 6 - 20 MG/DL Final     Creatinine   Date Value Ref Range Status   03/13/2023 4.28 (H) 0.70 - 1.30 MG/DL Final     Comment:     ICTERIC  INVESTIGATED PER DELTA CHECK PROTOCOL          Meds Given   Name Dose Route                    Adequacy / Fluid    Total Liters Process: 66 L   Net Fluid Removed: 1000 ml      Comments   Time Out Done:   (Time) Yes, 6812 Admitting Diagnosis: Renal failure   Consent obtained/signed: Informed Consent Verified: Yes (03/13/23 0525)   Machine / RO # Machine Number: [id number] (03/13/23 4357)   Primary Nurse Rpt Pre: Mitch Alcocer RN   Primary Nurse Rpt Post: Ya Wilson RN   Pt Education: Unable d/t sedation    Care Plan: Continue HD as ordered   Pts outpatient clinic:      Tx Summary   Comments:            tolerated tx well, at end, all blood in circuit returned with 300 ml NS, LIJ cath in plavce, patent, dressing dry and intact, no S/S of infection

## 2023-03-13 NOTE — PROGRESS NOTES
Problem: Mobility Impaired (Adult and Pediatric)  Goal: *Acute Goals and Plan of Care (Insert Text)  Description: FUNCTIONAL STATUS PRIOR TO ADMISSION: Patient was modified independent using a rollator for functional mobility. Pt recently d/c home after previous hospital stay. Able to ambulate community distances. HOME SUPPORT PRIOR TO ADMISSION: The patient lived with spouse but did not require assist.    Physical Therapy Goals  Initiated 3/12/2023  1. Patient will move from supine to sit and sit to supine , scoot up and down, and roll side to side in bed with maximal assistance within 7 days. 2.  Patient will sit EOB x3 minutes with max assist within 7 days. 3.  Patient will follow 50% of commands for participation in AROM while supine within 7 days. 4.  Patient will tolerate bed in chair position 30 min BID within 7 days. 5.  Family will be independent and compliant with in PROM for all extremities within 7 days. Updated 2/13/2023  1. Patient will move from supine to sit and sit to supine in bed with modified independence within 7 day(s). MET 2/13  2. Patient will transfer from bed to chair and chair to bed with modified independence using the least restrictive device within 7 day(s). MET 2/13  3. Patient will perform sit to stand with modified independence within 7 day(s). MET 2/13  4. Patient will ambulate with modified independence for 300 feet with the least restrictive device within 7 day(s). MET 2/13  5. Patient will ascend/descend 12 stairs with 1 handrail(s) with supervision/set-up within 7 day(s). 6.  Patient will verbally and functionally recall move in the tube techniques in prep for possible LVAD within 7 days. Physical Therapy Goals  Initiated 1/28/2023-- Goals appropriate and add #6 2/6/2023  1. Patient will move from supine to sit and sit to supine  in bed with modified independence within 7 day(s).     2.  Patient will transfer from bed to chair and chair to bed with modified independence using the least restrictive device within 7 day(s). 3.  Patient will perform sit to stand with modified independence within 7 day(s). 4.  Patient will ambulate with modified independence for 300 feet with the least restrictive device within 7 day(s). 5.  Patient will ascend/descend 12 stairs with 1 handrail(s) with supervision/set-up within 7 day(s). 6.  Patient will verbally and functionally recall move in the tube techniques in prep for possible LVAD within 7 days. Outcome: Progressing Towards Goal    PHYSICAL THERAPY TREATMENT  Patient: Jem Dillard (76 y.o. male)  Date: 3/13/2023  Diagnosis: CHF (congestive heart failure) ([facility]) [I50.9] <principal problem not specified>  Procedure(s) (LRB):  LEFT GROIN RP IMPELLA INSERTION, KIARRA BY DR Johanna Roblero (Left) 23 Days Post-Op  Precautions: Fall (mindful movement)  Chart, physical therapy assessment, plan of care and goals were reviewed. ASSESSMENT  Patient continues with skilled PT services and is progressing towards goals. RN cleared for therapy. Pt with eyes close on arrival. Pt did awake to painful stimulus. Pt did not follow commands. Performed PROM noted facial expression of pain with motion. Pt  with clonus on right ankle and tone with movement. Will continue to progress as able  . Current Level of Function Impacting Discharge (mobility/balance): total A     Other factors to consider for discharge: medical complexity          PLAN :  Patient continues to benefit from skilled intervention to address the above impairments. Continue treatment per established plan of care. to address goals.     Recommendation for discharge: (in order for the patient to meet his/her long term goals)  To be determined: depending on progression     This discharge recommendation:  Has not yet been discussed the attending provider and/or case management    IF patient discharges home will need the following DME: to be determined (TBD)       SUBJECTIVE: Patient trach- no command following     OBJECTIVE DATA SUMMARY:   Critical Behavior:  Neurologic State: Eyes open to voice  Orientation Level: Unable to verbalize  Cognition: No command following  Safety/Judgement: Awareness of environment, Fall prevention, Insight into deficits  Functional Mobility Training:  Bed Mobility:     Supine to Sit: Total assistance              Therapeutic Exercises:     PROM to LE noted no initiation. Pt expressed pain with movement. Noted right LE clonus with rigidity. Pt open eyes with pain stimulus not tracking          Pain Rating:  Facial expressed with extremity movement     Activity Tolerance:   Poor    After treatment patient left in no apparent distress:   Supine in bed and Caregiver / family present    COMMUNICATION/COLLABORATION:   The patients plan of care was discussed with: Occupational therapist and Registered nurse.      Sheridan Rinne, PTA   Time Calculation: 17 mins

## 2023-03-13 NOTE — PROGRESS NOTES
Name: Harsha Cedeno   MRN: [id number]  : 1950      Assessment:  TANNER on CKD-3a: Suspect cardiorenal effects initially. Now has LVAD and  POST OP ATN. Anuric->Requiring CRRT>>now iHD    hypercalcemia/immobilization- also has elevated PTH; may have component of 1ry vs secondary HPT; s/p Calcitonin x 4 doses for high Ca    Possible pancreatitis- with elevated lipase; Hypercalcemia can contribute  Ischemic CM: Recurrent exacerbations. EF 20%. S/p LVAD on 2/15. Required Impella RP for RV support  CVA  Sepsis  BPH   Well differentiated NET Grade 1: noted on EGD 23  Anemia 2 to CKD:  s/p PRBCS  Left UE basilic and radial vein thrombus  Thrombocytopenia     Was on CVVH. Transition to IHD on 3/6/2023. Calcium is improved. Got iUF yesterday for volume removal   His left IJ Hermelindo catheter was replaced on 3/11/2023 by intensivist  Calcium fluctuating    Plan/Recommendations:  HD today-> 2Ca bath  Will check on tomorrow to see if he would benefit from more aggressive HD/clearance  Ct Sensipar- dose increased on 3/9   Will hold off on IV Zometa or Pamidronate as last resort  BRIGHT      Subjective:  Afebrile. Working with PT. Wife at bedside. HD earlier today. 1L UF tolerated.  Required low dose Levophed    ROS:   UTO    Exam:  Visit Vitals  BP (!) 110/58   Pulse (!) 104   Temp 98.2 °F (36.8 °C)   Resp 26   Ht 5' 6\" (1.676 m)   Wt 70 kg (154 lb 5.2 oz)   SpO2 100%   BMI 24.91 kg/m²     NAD  +icterus  +trach  Tr edema-improved  + Jaundice    Current Facility-Administered Medications   Medication Dose Route Frequency Last Admin    insulin NPH (NOVOLIN N, HUMULIN N) injection 10 Units  10 Units SubCUTAneous Q12H 10 Units at 23 0930    albumin human 25% (BUMINATE) solution 12.5 g  12.5 g IntraVENous Q12H 12.5 g at 23 0930    oxyCODONE IR (ROXICODONE) tablet 5 mg  5 mg Oral Q4H PRN      oxyCODONE IR (ROXICODONE) tablet 10 mg  10 mg Oral Q4H PRN      pantoprazole (PROTONIX) 40 mg in 0.9% sodium chloride 10 mL injection  40 mg IntraVENous DAILY 40 mg at 03/13/23 0929    cinacalcet (SENSIPAR) tablet 60 mg  60 mg Oral DAILY WITH BREAKFAST 60 mg at 03/13/23 0923    labetaloL (NORMODYNE;TRANDATE) injection 5 mg  5 mg IntraVENous Q4H PRN 5 mg at 03/09/23 0307    [Held by provider] heparin 25,000 units in D5W 250 ml infusion  400 Units/hr IntraVENous CONTINUOUS Stopped at 03/10/23 1350    levETIRAcetam (KEPPRA) injection 250 mg  250 mg IntraVENous Once per day on Tue Thu Sat 250 mg at 03/11/23 2114    0.9% sodium chloride infusion  14 mL/hr IntraVENous CONTINUOUS 14 mL/hr at 03/09/23 0757    levETIRAcetam (KEPPRA) injection 500 mg  500 mg IntraVENous DAILY 500 mg at 03/13/23 0930    albumin human 25% (BUMINATE) solution 25 g  25 g IntraVENous DIALYSIS PRN 25 g at 03/13/23 0539    insulin lispro (HUMALOG) injection   SubCUTAneous Q6H 2 Units at 03/13/23 1241    hydrALAZINE (APRESOLINE) 20 mg/mL injection 5 mg  5 mg IntraVENous Q6H PRN 5 mg at 03/10/23 1316    hydrALAZINE (APRESOLINE) tablet 5 mg  5 mg Oral PRN      aspirin chewable tablet 81 mg  81 mg Per NG tube DAILY 81 mg at 03/13/23 0930    bicarbonate dialysis (PRISMASOL) BG K 4/Ca 2.5 5000 ml solution   Extracorporeal DIALYSIS CONTINUOUS New Bag at 03/05/23 1106    epoetin heidy-epbx (RETACRIT) injection 10,000 Units  10,000 Units SubCUTAneous Q TUE, THU & SAT 10,000 Units at 03/11/23 2119    EPINEPHrine (ADRENALIN) 10 mg in 0.9% sodium chloride 250 mL infusion  0-10 mcg/min IntraVENous TITRATE Stopped at 03/05/23 0920    dextrose (D50W) injection syrg 25 g  25 g IntraVENous PRN 9 mL at 02/25/23 1225    neomycin-polymyxin-dexamethasone (DEXACINE) 3.5 mg/g-10,000 unit/g-0.1 % ophthalmic ointment   Both Eyes BID Given at 03/13/23 0932    NOREPINephrine (LEVOPHED) 8 mg in 5% dextrose 250mL (32 mcg/mL) infusion  0.5-16 mcg/min IntraVENous TITRATE 2 mcg/min at 03/13/23 1154    colchicine tablet 0.6 mg  0.6 mg Oral DAILY 0.6 mg at 03/13/23 0930    heparin (porcine) 1,000 unit/mL injection 1,700 Units  1,700 Units InterCATHeter DIALYSIS PRN 1,700 Units at 03/13/23 0819    And    heparin (porcine) 1,000 unit/mL injection 1,500 Units  1,500 Units InterCATHeter DIALYSIS PRN 1,500 Units at 03/13/23 0819    balsam peru-castor oiL (VENELEX) ointment   Topical BID Given at 03/13/23 0931    acetaminophen (TYLENOL) solution 650 mg  650 mg Oral Q4H  mg at 02/20/23 0401    acetaminophen (TYLENOL) suppository 650 mg  650 mg Rectal Q4H  mg at 02/17/23 2013    HYDROmorphone (DILAUDID) injection 0.5 mg  0.5 mg IntraVENous Q3H PRN 0.5 mg at 03/09/23 1225    HYDROmorphone (DILAUDID) injection 1 mg  1 mg IntraVENous Q4H PRN 1 mg at 03/09/23 0557    [Held by provider] heparin 25,000 units in D5W 250 ml infusion  12-25 Units/kg/hr IntraVENous TITRATE Stopped at 03/08/23 0515    glucose chewable tablet 16 g  4 Tablet Oral PRN      glucagon (GLUCAGEN) injection 1 mg  1 mg IntraMUSCular PRN      sodium chloride (NS) flush 5-40 mL  5-40 mL IntraVENous Q8H 10 mL at 03/13/23 9552    sodium chloride (NS) flush 5-40 mL  5-40 mL IntraVENous PRN 40 mL at 02/17/23 1818    naloxone (NARCAN) injection 0.4 mg  0.4 mg IntraVENous PRN      ELECTROLYTE REPLACEMENT NOTE: Nurse to review Serum Potassium and Magnesuim levels and Initiate Electrolyte Replacement Protocol as needed  1 Each Other PRN      dextrose 10 % infusion 0-250 mL  0-250 mL IntraVENous PRN 75 mL at 02/24/23 0220    acetaminophen (TYLENOL) tablet 650 mg  650 mg Oral Q6H  mg at 02/17/23 0200    ondansetron (ZOFRAN) injection 4 mg  4 mg IntraVENous Q4H PRN 4 mg at 03/09/23 1212    albuterol-ipratropium (DUO-NEB) 2.5 MG-0.5 MG/3 ML  3 mL Nebulization Q6H PRN      senna-docusate (PERICOLACE) 8.6-50 mg per tablet 1 Tablet  1 Tablet Oral DAILY 1 Tablet at 03/11/23 0822    polyethylene glycol (MIRALAX) packet 17 g  17 g Oral DAILY 17 g at 03/03/23 0856    bisacodyL (DULCOLAX) suppository 10 mg  10 mg Rectal DAILY PRN 10 mg at 02/22/23 0839    0.45% sodium chloride infusion  10 mL/hr IntraVENous CONTINUOUS Stopped at 03/10/23 1707    dexmedeTOMidine in 0.9 % NaCl (PRECEDEX) 400 mcg/100 mL (4 mcg/mL) infusion soln  0.1-1.5 mcg/kg/hr IntraVENous TITRATE Stopped at 03/08/23 2300       Labs/Data:    Lab Results   Component Value Date/Time    WBC 8.1 03/13/2023 03:27 AM    HGB 8.4 (L) 03/13/2023 03:27 AM    HCT 26.4 (L) 03/13/2023 03:27 AM    PLATELET 475 05/42/1151 03:27 AM    .0 (H) 03/13/2023 03:27 AM       Lab Results   Component Value Date/Time    Sodium 138 03/13/2023 03:27 AM    Potassium 3.9 03/13/2023 03:27 AM    Chloride 105 03/13/2023 03:27 AM    CO2 19 (L) 03/13/2023 03:27 AM    Anion gap 14 03/13/2023 03:27 AM    Glucose 205 (H) 03/13/2023 03:27 AM    BUN 73 (H) 03/13/2023 03:27 AM    Creatinine 4.28 (H) 03/13/2023 03:27 AM    BUN/Creatinine ratio 17 03/13/2023 03:27 AM    GFR est AA 46 (L) 06/23/2022 09:40 AM    GFR est non-AA 38 (L) 06/23/2022 09:40 AM    eGFR 14 (L) 03/13/2023 03:27 AM    eGFR 69 01/25/2023 11:55 AM    Calcium 12.1 (H) 03/13/2023 03:27 AM       Wt Readings from Last 3 Encounters:   03/13/23 70 kg (154 lb 5.2 oz)   01/25/23 55.8 kg (123 lb)   01/23/23 54.9 kg (121 lb)         Intake/Output Summary (Last 24 hours) at 3/13/2023 1301  Last data filed at 3/13/2023 1200  Gross per 24 hour   Intake 1312 ml   Output 1000 ml   Net 312 ml         Patient seen and examined. Chart reviewed. Labs, data and other pertinent notes reviewed in last 24 hrs.     PMH/SH/FH reviewed and unchanged compared to H&P      Cole Nugent MD  [id number] [facility]

## 2023-03-13 NOTE — DIABETES MGMT
[facility]  DIABETES MANAGEMENT CONSULT    Consulted by  Conchis Brewer MD   for advanced nursing evaluation and care for inpatient blood glucose management. Evaluation and Action Plan   Hanna Ingram is a 71year old gentleman, with Type 2 Diabetes with a recent A1C of 7.7%, who is admitted with arrhythmias and acute on chronic HFrEF with failure to respond to inotrope support. He was discharged one week prior from a prolonged hospital stay for acute on chronic HF and LVAD work-up and discharged home on dobutamine with a lifevest.  Glucose control was tenuous during his previous admission s/t multiple co-morbidities, several tests/procedures requiring NPO status, waxing and waining oral intake. At this time glucose is impacted by:  Heart Failure with reduced EF 25-30%, LVAD implant  Vasopressor use: levo at low rate  HD  Enteral Feeds    This admission, he required low basal doses but high bolus doses with meals to offset pre-prandial hyperglycemia. An insulin gtt has been used for glycemic control post-operatively and insulin rates have been erratic over the last 2 weeks. His insulin rates have been stable over the last 4 days transitioned off to SQ basal/correction insulin. His insulin needs fell this weekend, likely related to epi weaning to off. BG target is now at goal of 140-180mg/dl. Action Plan  Increased NPH to 10 units Q12 as BG trended above goal.  Hold if feeds are held    Humalog at high sensitivity Q4h            Initial Presentation   Hanna Ingram is a 71 y.o. male who presented to the ED 1/26/23 with lower extremity swelling and difficulty breathing. He had a prolonged hospital admission from 12/22/22-1/19/23 for acute on chronic systolic HF and LVAD work-up. He was discharged with home inotrope. LAB: , GFR 58, Trop 2003, BNP 4524  CXR: New diffuse bilateral increased interstitial markings, partially obscuring bilateral pulmonary nodules. HX:   Past Medical History:   Diagnosis Date    CAD (coronary artery disease) 11/10/2016    NSTEMI & 2 stents    Deafness 10/28/2012    DM (diabetes mellitus) ([facility]     Elevated cholesterol     Hypertension     NSTEMI (non-ST elevated myocardial infarction) ([facility] 11/10/2016        INITIAL DX:   CH[facility] (congestive [facility] failure) [facility] [I50.9]     Current Treatment     TX: Milrinone, Amio. Specialty consultations: [facility], Cardiology, EP, GI     Hospital Course   Clinical progress has been complicated by:    4/68: Admission. Rapid response for SVT- Given adenosine x2, amio bolus/gtt  1/27: Advanced HFC consult. EP consult ordered. Intolerance of inotropic support. Cardiology consult. NSTEMI, heparin gtt started. Seen by EP: Continue amio. 1/28: Febrile: CT chest 1/28 shows diffuse focal opacities concerning for pneumonia. Obtain blood cultures, UA. Pathology report 1/18/23: well differentiated neuroendocrine tumor grade 1. EGD: Patchy erythema gastric body, biopsies done. 6 mm sessile polyp gastric body biopsied  1/30: Oncology consult: Recommend multiphase CT of the abdomen and pelvis to evaluate for metastatic spread. Tachycardia and SOB, BiPAP overnight. 2/3: Accepted for VAD implant if renal fx improves per [facility]. CCU Transfer and swan placed  2/4: PRBC transfusion   2/6: With increased dyspnea. Doubtamine started  2/15: LVAD Implant  2/17: Extubated. CRRT started. ECHO with persistent RV failure. OR for RV impella. Remained Intubated post-op  2/20: UA with large leuk esterase and 2+ bacteria. Urine Cx with yeast  2/21: PRBC transfusion, PLT transfusion    2/22: Bronch   3/2: Code Stroke. Head CT Occlusion distal right anterior cerebral artery, with associated small area of matched perfusion defect and cortical enhancement, consistent with subacute infarct.  Diminutive and irregular right vertebral artery, occluded at the V3-4 junction, age indeterminate extensive multifocal atherosclerosis in the intracranial and extracranial circulation. Partly visualized life support lines and tubes. Postsurgical changes in the chest partly visualized. Bilateral pleural effusions. No venous thrombus. Extubated, and re-intubated   3/3: Pancreatitis   3/7: Trach placement. Bronchoscopy. Elevated Lactate, New CVL palced. 3/9: PRBC Transfusion   Diabetes History   Type 2 Diabetes  Ambulatory BG management provided by: PCP Lucretia Buckner MD    Diabetes-related Medical History  Acute complications  Acute hyperglycemia  Neurological complications  Peripheral neuropathy  Microvascular disease  Nephropathy  Macrovascular disease  CAD  Other associated conditions                                       CHF     Diabetes Medication History  Key Antihyperglycemic Medications        Diabetes Medication History  Key Antihyperglycemic Medications               metFORMIN (GLUCOPHAGE) 500 mg tablet (Taking/) Take 1 Tablet by mouth two (2) times daily (with meals) for 30 days.     glipiZIDE (GLUCOTROL) 5 mg tablet (Taking) Take 1 tablet by mouth twice daily    Januvia 50 mg tablet (Taking) Take 1 tablet by mouth once daily             Diabetes self-management practices:   Eating pattern                Eats 3 small meals daily  [x]         Breakfast                         2 Eggs, Coffee  [x]         Lunch                               [place]  [x]         Dinner                              \" Food\" Bread, rice, lentils   [x]         Bedtime                           [place]  [x]         Snacks                              Afternoon snack  [x]         Beverages                        Water, Coffee  Physical activity pattern                Sedentary   Monitoring pattern  Discharged last week with a Carolynn CGM and serum glucometer  7 day average Ba-9a: 129-164  9a-12: 243  Noon to 9p: 198-238  1 low BG in the last week overnight    Taking medications pattern  [x]         Consistent administration  [x] Affordable  Social determinants of health impacting diabetes self-management practices   Concerned that you need to know more about how to stay healthy with diabetes  Overall evaluation:                 [x]         Achieving A1c target with drug therapy & self-care practices    Subjective     Objective   Physical exam  General Underweight [place] [facility] male in critical condition in CVICU. Trach. LVAD. Neuro  Resting in bed. Not interactive/not following commands. Vital Signs Visit Vitals  BP (!) 110/58   Pulse (!) 105   Temp 98.2 °F (36.8 °C)   Resp (!) 33   Ht 5' 6\" (1.676 m)   Wt 70 kg (154 lb 5.2 oz)   SpO2 100%   BMI 24.91 kg/m²     Skin  Warm and dry. No acanthosis noted along neckline. Sternal surgical incision. LVAD  Heart   Regular rate and rhythm.  No murmurs, rubs or gallops  Lungs  Clear to auscultation without rales or rhonchi  Extremities No foot wounds        Laboratory  Recent Labs     03/13/23  0327 03/12/23  0506 03/11/23  0432   * 200* 215*   AGAP 14 12 15   WBC 8.1 7.1 8.1   CREA 4.28* 3.17* 2.51*   * 187* 175*   * 110* 107*         Factors impacting BG management  Factor Dose Comments   Nutrition:  Standard meals     Enteral feeds  Vital 1.5 @ 40 ml/hr  165g CHO/24h      Heart Failure/Cardiogenic Shock HeartMate 3 LVAD  BNP 48244  Low dose norepi (1)         Lactic acidosis Lac 1.8    Other:   Kidney function TANNER on CKD:   GFR 14      Transaminitis     Alk Phos 288    Pancreatitis Lipase >3000      Blood glucose pattern    Significant diabetes-related events over the past 24-72 hours  A1C 7.7% 1/11/23  24h BG pattern: 176-217  NPH: 10 units Q12 (5 units Q12 this weekend)  Correction: 5  units in the last 24h  Lipase >3000    Assessment and Nursing Intervention   Nursing Diagnosis Risk for unstable blood glucose pattern   Nursing Intervention Domain 6606 Decision-making Support   Nursing Interventions Examined current inpatient diabetes/blood glucose control Explored factors facilitating and impeding inpatient management  Explored corrective strategies with patient and responsible inpatient provider   Informed patient of rational for insulin strategy while hospitalized     Billing Code(s)   36029    Before making these care recommendations, I personally reviewed the hospitalization record, including notes, laboratory & diagnostic data and current medications, and examined the patient at the bedside (circumstances permitting) before determining care. More than fifty (50) percent of the time was spent in patient counseling and/or care coordination.   Total minutes: 25    SLICK Li  Diabetes Clinical Nurse Specialist  [facility]  Access via [employer]

## 2023-03-13 NOTE — PROGRESS NOTES
CRITICAL CARE NOTE      Name: Leandra Carver   : 1950   MRN: [id number]   Date: 3/13/2023      Reason for ICU Admission: Acute on chronic HFrEF     ICU PROBLEM LIST   Acute on chronic HFrEF (20%) NYHA IIIb/IV (D) on home inotropic support, life vest s/p LVAD  TANNER on CKD3, on CRRT  CHB post op, resolved  Aflutter w/ RVR  Acute on chronic hypoxemic respiratory failure, s/p trach placement  CAP  Gastric neuroendocrine tumor  Hx of LUE DVT  DM  PAD  TRISTAN  BPH w/ urinary retention requiring chronic donnelly    HISTORY OF PRESENT ILLNESS:   In brief, A 71year old male PMH as noted above admitted to [facility] on  due to ongoing symptoms secondary to his HFrEF. Treated for possible CAP, and diuresed for acute on chronic HFrEF. Nephrology following for TANNER on CKD 3. AHF team recommended transfer to CVICU for [facility] placement and ongoing LVAD workup, with placement 2/15. Pt extubated , however with development of worsening renal dysfunction overnight and unable to tolerate CRRT so was reintubated and taken for Impella RP placement for right-sided support in a.m. of  and CRRT resumed. Impella removed on 3/1. Overnight 3/1-3/1 CVA noted with SAH.  3/2 SAH enlarged. Held anticoagulation. Failed extubation 3/2. Trach placed 3/7. Transitioned to [facility]. Repeat CTH 3/10 w/ persistent SAH, heparin held. 24 HOUR EVENTS:   No new events overnight. On levophed 1 royer intermittently. Dialysis in progress. Unable to move extremities but tracks with eye appropriately. Afebrile. BP and HR stable. Reportedly clot and small hematuria this morning    ASSESSMENT AND PLANS:   In brief, A 71year old male PMH as noted above admitted to [facility] on  due to ongoing symptoms secondary to his HFrEF with SAH, s/p trach, dialysis.       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    -PRN anxiolytics, pain regimen  -Avoid sedation if possible  -Repeat Head CT on 3/11 with stable SAH. Will repeat Head on 3/14  -Off anticoagulation   -Delirium precautions      PULMONOLOGY:   Acute hypoxic respiratory failure. Failed extubation  S/p trach on 3/7    -Will continue weaning trial as tolerates  -Lung protective ventilation, on minimal vent support at this time  -Monitor bleeding at trach site  -PT OT, sit up in bed with goal out of bed to chair as tolerated      CARDIOVASCULAR:   Ischemic and non-ischemic cardiomyopathy with LVEF at 25-30%  S/p LVAD on 2/15/2023  S/p impella RP, removed on 3/1  Hepatocongestion  Cardiorenal syndrome    -MAP goal >65 <85, PRN vasopressors  -ECHO 3/10 Right ventricle is moderately dilated. Mildly reduced systolic function LVID and TAPSE improved. -CVP goal 10-12  -C/w ASA, statin  -Goal euvolemia  -Unable to tolerate BB, ARNI/ARB due to hypotension      GASTROINTESTINAL:   Hepatocongestion  Hyperbilirubinemia  Elevated Lipase without evidence of pancreatitis on ABD CT  Concern for Cirrhosis    -Will continue feeding via  tube feeds  -PPI   -Trend LFTs  -Hepatology has been consulted, hepatic dysfunction likely 2/2 low flow and venous stasis  -US ABD: unable to assess pancreas due to intestinal air, evidence of chronic hepatic congestion and possible cirrhosis      RENAL/ELECTROLYTE/FLUIDS:   Aucte on Chronic renal failure. Was on CRRT, now iHD    -Strict I/Os,  goal euvolemic  -iHD today. Goal 1 Kg removal  -HD per nephrology.  Appreciate assist  -Monitor RFP  -Mg/Phos/K >2/3/4  -Straight cath X1 due to clot    ENDOCRINE:   SSI, Will increase Basal insulin dose   Hypoglycemic protocol  Glucose goal 120-180    HEMATOLOGY/ONCOLOGY:   Acute on chronic anemia  Gastric neuroendocrine tumor, well differentiated, good prognosis per onc.     -Off heparin drip  -Hemoglobin goal greater than 7, platelet goal greater than 50  -EPO weekly    ID/MICRO:   Ucx/Bcx NGTD  -Off cefepime  -Urine with Candida s/p fluconazole    ICU DAILY CHECKLIST     Code Status:Full  DVT Prophylaxis: SCDs  SUP: PPI  Diet: TF  Activity Level:ad jose f  ABCDEF Bundle/Checklist Completed:Yes  Disposition: Stay in ICU  Multidisciplinary Rounds Completed:  yes  Patient/Family Updated: Yes    Tatiana   2/3 Transferred to CVICU for [facility] placement   started on dobutamine as adjunct to milrinone  2/15 LVAD implant   extubated   Impella RP placement  3/1 Impella removed  3/1-2 SAH on CT  3/3 RE-intubated  3/6 Blakes removed, transitioned to HD  3/7 Trach placment  3/10 persistent SAH, heparin stopped  3/11 HD catheter replaced    SUBJECTIVE:     As noted above    Review of Systems:     Unable to perform due to patient trached    OBJECTIVE:     Labs and Data: Reviewed 23  Medications: Reviewed 23  Imaging: Reviewed 23    General - somnolent, chronically ill appearing, eyes open. HEENT- pupils equal, no nystagmus, jaundiced sclerea  Neuro - unable to move extremities, eyes open and tracks appropriately  CV - Paced,  post sternotomy  Resp - trached, good airflow bilateral without wheezing or rales. Decreased at bases  Abd - soft, not distended, nontender, no guarding  MSK- intact, no sacral ulcer, anasarca  SKIN: juandiced    Visit Vitals  /64   Pulse (!) 101   Temp 98.9 °F (37.2 °C)   Resp 26   Ht 5' 6\" (1.676 m)   Wt 70.1 kg (154 lb 8.7 oz)   SpO2 97%   BMI 24.94 kg/m²    O2 Flow Rate (L/min): 20 l/min O2 Device: Tracheostomy, Ventilator Temp (24hrs), Av.6 °F (37 °C), Min:98.2 °F (36.8 °C), Max:99.9 °F (37.7 °C)    CVP (mmHg): 11 mmHg (23 0600)      Intake/Output:     Intake/Output Summary (Last 24 hours) at 3/13/2023 4028  Last data filed at 3/13/2023 0000  Gross per 24 hour   Intake 1153 ml   Output 0 ml   Net 1153 ml       Imaging  All images and labs were reviewed by me personally. 23    ECHO ADULT FOLLOW-UP OR LIMITED 2023    Interpretation Summary    Left Ventricle: EF by visual approximation is 20%.  Left ventricle is mildly dilated. Mitral Valve: Moderately thickened leaflet, at the anterior and posterior leaflets. Moderately calcified leaflet. Moderate regurgitation. Left Atrium: Left atrium is moderately dilated. Technical qualifiers: Echo study was technically difficult with poor endocardial visualization. Contrast used: Definity. Limited study, not all structures viewed    Signed by: Aubrie Hearn MD on 1/27/2023  4:39 PM       Pertinent imaging reviewed and interpreted as noted above    CRITICAL CARE DOCUMENTATION  I had a face to face encounter with the patient, reviewed and interpreted patient data including clinical events, labs, images, vital signs, I/O's, and examined patient. I have discussed the case and the plan and management of the patient's care with the consulting services, the bedside nurses and the respiratory therapist.      NOTE OF PERSONAL INVOLVEMENT IN CARE   This patient has a high probability of imminent, clinically significant deterioration, which requires the highest level of preparedness to intervene urgently. I participated in the decision-making and personally managed or directed the management of the following life and organ supporting interventions that required my frequent assessment to treat or prevent imminent deterioration. I personally spent 40 minutes of critical care time. This is time spent at this critically ill patient's bedside actively involved in patient care as well as the coordination of care. This does not include any procedural time which has been billed separately.     Callum Vela MD  Pulmonary Critical Care Medicine  [facility]

## 2023-03-13 NOTE — PROGRESS NOTES
Left message w/pt. Wife to coordinate the next family meeting. Pt. Dr. Leland Mclaughlin will be present this week. Last meeting was 3/10 w. JACOB Linton.

## 2023-03-13 NOTE — PROGRESS NOTES
7857: dialysis complete, levophed off    1042: levophed restarted at 2 mcg, MAP in the low 60\"s    1400: SBT, became very tachypneic and breathing very shallow, back on full support    1700: MAP's staying in the low 60's, Levophed up to 4 mcg    2030: Bedside shift change report given to [facility] RN (oncoming nurse) by Sarthak Lunsford RN (offgoing nurse). Report included the following information SBAR, Intake/Output, MAR, and Recent Results.

## 2023-03-14 NOTE — PROGRESS NOTES
Physical Therapy    Reviewed chart and discussed with RN. Rn is requesting to defer due to decrease appropriateness. Pt lethargic not following commands. RN reports possible palliative involvement  Will defer at this time and continue to follow.

## 2023-03-14 NOTE — PROGRESS NOTES
2000: Bedside and Verbal shift change report given to Mina Bill RN (oncoming nurse) by Ivan Bhatt RN (offgoing nurse). Report included the following information SBAR, Kardex, and Recent Results. 2030: pt temp: 100.6 F; prn tylenol given    0000: pt temp: 98.9 F.     0330: Morning labs drawn.     0400: chg bath completed

## 2023-03-14 NOTE — PROGRESS NOTES
Transitions of Care Plan  RUR: 27% - high  Clinical Update: trach w vent support; no command following; s/p LVAD; HD MWF; tube feeds   Consults: Multiple  Baseline: ambulates w rollator; resides w wife; [facility]  Barriers to Discharge: medical  Disposition: pending medical progress - anticipate LTAC vs IPR vs SNF  Estimated Discharge Date: 2+ days    Clinical update per chart review and/or patient discussed during Interdisciplinary Rounds:    Patient is not medically stable for discharge due to ongoing medical needs. CM continues to follow treatment plan for medical progress and disposition needs.     Disposition:  Pending medical progress    Eleuterio Aguirre, MPH  Care Manager [facility]  Available via [facility] or

## 2023-03-14 NOTE — PROGRESS NOTES
Occupational Therapy  3/14/2023    Reviewed chart and discussed with PT. Per RN, patient is not appropriate for OT session at this time. Remains lethargic, with little to no command following, and plans for possible palliative involvement. Will hold at this time and continue to follow.      Naty Dumont, MARJAN, OTR/L

## 2023-03-14 NOTE — PROGRESS NOTES
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 Star Lassiter  C/b RV failure requiring Impella RP placed 23 and discontinued 3/1/23  C/b acute on chronic renal failure, requiring CRRT  C/b hepatic failure, TB 12 (peak 15.3)  C/b lactic acidosis, improved  C/b persistent septic shock c/b vasodilation and high outputs, on multiple antibiotics, procalcitonin persistently elevated  C/b R SUPA occlusion with small area of matched perfusion defect - subacute infarct c/b left sided hemiparesis  C/b pancreatitis, persistent  C/b failure to extubate x 2 s/p tracheostomy  Patient was approved for LVAD implantation as destination therapy at [facility] 2/3/23; the following have been identified and d/w patient as relative concerns pertaining to LVAD candidacy: small body surface area, cachexia, BMI 18, malnutrition, frailty, advanced age, muscular deconditioning, PVD (fingertips), urinary retention requiring donnelly catheter, neuroendocrine tumor class 1 requiring treatment after LVAD, mild neurocognitive dysfunction, chronic kidney disease and hearing loss requiring hearing aid  Family meeting today lead by Dr. Star Lassiter, Dr. Shilpi Torres, Dr. Lisandro Duval and Dr. Liliana Christensen  Patient remains critically ill, improved RV and respiratory status; persistent liver failure and pancreatitis complicating picture; we are still hopeful for recovery over the next two week, but would like to meet with family to prepare for \"what ifs\", will schedule palliative care meeting this Thursday or Friday        RECOMMENDATIONS:  Continue LVAD speed to 4700rpm  Use hydralazine 5mg IV q4h prn MAP>85  No beta-blockers due to hypotension and RV conditioning prior to LVAD  Cannot tolerate ARB/ARNi due to hypotension and upcoming surgical procedure   Cannot tolerate spironolactone due to hyperkalemia  Cannot tolerate jardiance due to significant diuresis on smallest dose/contributed to IVVD  Previously discontinued corlanor due to SVT  Digoxin stopped d/t risk for toxicity with amio loading  Discontinued amio due to intermitted heart blocks under pacemaker  HD per nephrology T/TR/SAT ; goal CVP 10-12  UF on alternative days   Keep K+ >4 and Mg >2  ID recommendations appreciated- pan culture NGTD  Continue colchicine 0.6mg daily for possible pericarditis  Hold ASA d/t CVA   Continue to hold coumadin  Heparin gtt on hold for now due to slightly progressive on SAH on head CT  Hepatology recommendations appreciated   Cannot tolerate statins due to abnormal LFT  Management of tube feeds in light of pancreatitis per intensivist  Continue bowel regimen   Daily dressing changes to Drive line exit site  Pulmonary hygiene- continue SBT   VAD education with caregivers/patient when able by VAD coordinator  D/w  bedside staff      INTERVAL HISTORY:  Low grade temp  MAPs 70-80s, HR 90-100s  CVP 12  I/O net positive 451cc, dialysis 1 liters  Hgb down to 8.3  INR 1.7  Ca down to 10.6  Cr 3.1  TB 20.7 from 20.6 from 19.4  LFTs unchanged  Lipase > 3000  Lactic 1.9  ESR 67 from 56  Procalcitonin 5.2 from 4.9       IMPRESSION:  Acute on chronic combined systolic/diastolic  heart failure  Stage D, NYHA class IV symptoms   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, LVEF 20% (by echo 1/2023) and 23% (by cMRI 1/3/23)  Cardiac MRI suggestive of ischemic cardiomyopathy  PYP equivocal  S/p HeartMate 3  LVAD-DT (2/15/23)  C/b RV failure requiring Impella RP placed 2/18/23 and discontinued 3/1/23  C/b acute on chronic renal failure, requiring CRRT  C/b hepatic failure, TB 12 (peak 15.3)  C/b lactic acidosis, persistent  C/b persistent septic shock c/b vasodilation and high outputs, on multiple antibiotics, procalcitonin persistently elevated  C/b R SUPA occlusion with small area of matched perfusion defect - subacute infarct c/b left sided hemiparesis  C/b pancreatitis  C/b failure to extubate x 2; anticipating tracheostomy this week  Coronary artery disease  CAD s/p CABG x 2: further disease best managed medically due to small vessel size   At risk of sudden cardiac death  Peripheral arterial disease  Bilateral hydronephrosis s/p andrzej  Cardiac risk factors:  HTN  HL  TRISTAN, STOP-BANG 4  DM2  CKD, stage 3  MOCA from 2/9 19/30, consistent with mild cognitive impairment  Hard of hearing  Gastric Neuroendocrine Tumor, elevated gastrin and chromogranin A  Septic shock, improving   Left sided weakness noted night 3/1. +SAH and subacute occlusion distal R cerebral artery         LIFE GOALS: not readdressed today   Patient's personal goals included: having a few more years with family  Important upcoming milestones or family events: None at this time   The patient identified the following as important for living well: being at home, not being SOB            CXR (3/5/23) mild pulmonary edema. Small pleural effusions and bibasilar airspace opacities. Head CTA (3/2/23) Occlusion distal right anterior cerebral artery, with associated small area of matched perfusion defect and cortical enhancement, consistent with subacute infarct. Diminutive and irregular right vertebral artery, occluded at the V3-4 junction, age indeterminate extensive multifocal atherosclerosis in the intracranial and extracranial circulation. CARDIAC IMAGING:   Echo (3/13/23)    Left Ventricle: Severely reduced left ventricular systolic function with a visually estimated EF of 25 - 30%. Left ventricle is dilated. Normal wall thickness. Unable to assess wall motion. Right Ventricle: Moderately reduced systolic function.  TAPSE is abnormal. TAPSE is 1.1 cm. LVEDD 5.3cm     ECHO- 3/6/23       Left Ventricle: Severely reduced left ventricular systolic function with a visually estimated EF of 15 - 20%. Left ventricle is moderately dilated. LVAD is present. Right Ventricle: Right ventricle is moderately dilated. Mildly reduced systolic function. Echo 2/19/23    Left Ventricle: Severely reduced left ventricular systolic function with a visually estimated EF of 20 - 25%. Not well visualized. Left ventricle size is normal. Increased wall thickness. Unable to assess wall motion. Abnormal diastolic function. LVAD is present. LVAD inflow cannula was not well visualized. Right Ventricle: Not well visualized. Right ventricle is mildly dilated. Moderately reduced systolic function. TAPSE is abnormal.    Mitral Valve: Severe annular calcification at the anterior and posterior leaflets of the mitral valve. Mild regurgitation. Left Atrium: Left atrium is dilated. Right Atrium: Right atrium is dilated. Technical qualifiers: Echo study was technically difficult and technically difficult with poor endocardial visualization. Echo 1/27/23    Left Ventricle: EF by visual approximation is 20%. Left ventricle is mildly dilated. Mitral Valve: Moderately thickened leaflet, at the anterior and posterior leaflets. Moderately calcified leaflet. Moderate regurgitation. Left Atrium: Left atrium is moderately dilated. Technical qualifiers: Echo study was technically difficult with poor endocardial visualization. Contrast used: Definity. Limited study, not all structures viewed     Echo 1/9/23   Left Ventricle: Severely reduced left ventricular systolic function with a visually estimated EF of 25 - 30%. Left ventricle size is normal. Mildly increased wall thickness. Echo 12/26/22    Left Ventricle: Severely reduced left ventricular systolic function with a visually estimated EF of 25 - 30%.  Left ventricle size is normal. Normal wall thickness. There are regional wall motion abnormalities. Grade II diastolic dysfunction with increased LAP. Right Ventricle: Moderately reduced systolic function. TAPSE is abnormal. TAPSE is 1.1 cm. Aortic Valve: Mild stenosis of the aortic valve. AV peak gradient is 13 mmHg. AV peak velocity is 1.8 m/s. Mitral Valve: Not well visualized. Moderate annular calcification at the posterior leaflet of the mitral valve. Mild to moderate regurgitation. Tricuspid Valve: Mildly elevated RVSP. Left Atrium: Left atrium is moderately dilated. 12/8/22    Left Ventricle: Moderately reduced left ventricular systolic function with a visually estimated EF of 35 - 40%. Severe hypokinesis of the following segments: mid anteroseptal, apical anterior, apical septal, apical inferior and apical lateral. Severe hypokinesis of the apex. Mitral Valve: Severely thickened leaflet, at the anterior and posterior leaflets. Severely calcified leaflet, at the anterior and posterior leaflets. Mild annular calcification of the mitral valve. Moderate regurgitation. Left Atrium: Left atrium is mildly dilated. Contrast used: Definity. limited study     EKG 12/22/22 ST, Biatria enlargement, marked ST abnormality     [facility] 12/6/22  1. Normal LVEDP  2. Severe native multivessel coronary artery disease  3. Patent LIMA to LAD and vein graft to distal RCA  4. Recurrent ISR in OM1 stent with now 60 to 70% restenosis  5. Recoil of left main and circumflex stent with now recurrent 40 to 50% stenosis. 6.  Progression of ostial left main disease now to about 60% stenosis  7. Progression of disease in jailed first marginal branch now with diffuse 90% stenosis  8.   High-grade stenosis in the mid to distal right potential femoral artery treated with 6 x 40 mm impact drug-coated balloon angioplasty to reduce the stenosis to less than 40%        HEMODYNAMICS:  RHC 1/9/23  PA 20/9, RA 3, PCWP 8, CI 1.8     CPEST too ill   6MW 300 feet   Physical Assessment:   Physical Exam  Vitals and nursing note reviewed. Constitutional:       Appearance: He is ill-appearing, trach on vent   Cardiovascular:      Rate and Rhythm: Regular rhythm. Tachycardia present. Heart sounds: Normal heart sounds. No murmur heard. Comments: + VAD hum  Pulmonary:      Breath sounds: Rhonchi present. Comments: intubated  Abdominal:      General: There is distension. Palpations: Abdomen is soft. Musculoskeletal:         General: No swelling. Skin:     General: Skin is warm and dry. Coloration: Skin is jaundiced. Neurological:      Mental Status: He is alert.       Comments: sedated               REVIEW OF SYSTEMS:  Review of Systems   Unable to perform ROS: Acuity of condition      LVAD INTERROGATION:  Device interrogated in person  Device function normal, normal flow, no events  LVAD   Pump Speed (RPM): 4700  Pump Flow (LPM): 3.3  MAP: 80  PI (Pulsitility Index): 5.3  Power: 3   Test: Yes  Back Up  at Bedside & Labeled: Yes  Power Module Test: Yes  Driveline Site Care: Yes  Driveline Dressing: Clean, Dry, and Intact  Testing  Alarms Reviewed: No  Back up SC speed: 4700  Back up Low Speed Limit: 4300  Emergency Equipment with Patient?: Yes  Emergency procedures reviewed?: No  Drive line site inspected?: Yes  Drive line intergrity inspected?: Yes  Drive line dressing changed?: No    PHYSICAL EXAM:  Visit Vitals  BP (!) 110/58   Pulse 98   Temp 100.3 °F (37.9 °C)   Resp 28   Ht 5' 6\" (1.676 m)   Wt 139 lb 15.9 oz (63.5 kg)   SpO2 91%   BMI 22.60 kg/m²       PAST MEDICAL HISTORY:  Past Medical History:   Diagnosis Date    CAD (coronary artery disease) 11/10/2016    NSTEMI & 2 stents    Deafness 10/28/2012    DM (diabetes mellitus) (HCC)     Elevated cholesterol     Hypertension     NSTEMI (non-ST elevated myocardial infarction) ([facility] [address] 11/10/2016       PAST SURGICAL HISTORY:  Past Surgical History:   Procedure Laterality Date    COLONOSCOPY N/A 6/28/2018    COLONOSCOPY performed by Laura Ibanez MD at [facility] ENDOSCOPY    COLONOSCOPY N/A 1/18/2023    COLONOSCOPY performed by Dena Davis MD at [facility] ENDOSCOPY    [address]  11/11/2016    2 stents       FAMILY HISTORY:  Family History   Problem Relation Age of Onset    Heart Disease Father     Heart Attack Father     Hypertension Mother     Elevated Lipids Brother     Elevated Lipids Brother     No Known Problems Sister     Elevated Lipids Brother     No Known Problems Son     No Known Problems Daughter     Anesth Problems Neg Hx        SOCIAL HISTORY:  Social History     Socioeconomic History    Marital status:    Tobacco Use    Smoking status: Never     Passive exposure: Never    Smokeless tobacco: Never   Vaping Use    Vaping Use: Never used   Substance and Sexual Activity    Alcohol use: Yes     Alcohol/week: 2.0 standard drinks     Types: 1 Cans of beer, 1 Shots of liquor per week     Comment: rarely    Drug use: No    Sexual activity: Yes     Social Determinants of Health     Financial Resource Strain: Medium Risk    Difficulty of Paying Living Expenses: Somewhat hard   Food Insecurity: Food Insecurity Present    Worried About Running Out of Food in the Last Year: Never true    Ran Out of Food in the Last Year: Often true       LABORATORY RESULTS:     Labs Latest Ref Rng & Units 3/14/2023 3/13/2023 3/12/2023 3/11/2023 3/11/2023 3/10/2023 3/9/2023   WBC 4.1 - 11.1 K/uL 8.0 8.1 7.1 - 8.1 11. 4(H) 11.0   RBC 4.10 - 5.70 M/uL 2.60(L) 2.64(L) 2.50(L) - 2.26(L) 2.35(L) 2.53(L)   Hemoglobin 12.1 - 17.0 g/dL 8. 3(L) 8.4(L) 8. 1(L) 9.0(L) 7. 2(L) 7. 6(L) 7. 9(L)   Hematocrit 36.6 - 50.3 % 25. 7(L) 26. 4(L) 25. 2(L) 28. 4(L) 23. 4(L) 23. 5(L) 25. 7(L)   MCV 80.0 - 99.0 FL 98.8 100. 0(H) 100. 8(H) - 103. 5(H) 100. 0(H) 101. 6(H)   Platelets 469 - 476 K/uL 233 260 239 - 261 337 358   Lymphocytes 12 - 49 % 9(L) 8(L) 8(L) - 7(L) 6(L) 11(L) Monocytes 5 - 13 % 14(H) 14(H) 13 - 12 13 12   Eosinophils 0 - 7 % 8(H) 3 3 - 2 0 0   Basophils 0 - 1 % 1 0 0 - 0 0 1   Albumin 3.5 - 5.0 g/dL 3.5 3.5 4.0 - 3.9 4.0 4.0   Calcium 8.5 - 10.1 MG/DL 11. 9(H) 12. 1(H) 10. 6(H) - 11. 6(H) 11. 6(H) 11. 9(H)   Glucose 65 - 100 mg/dL 197(H) 205(H) 200(H) - 215(H) 226(H) 126(H)   BUN 6 - 20 MG/DL 62(H) 73(H) 52(H) - 69(H) 44(H) 56(H)   Creatinine 0.70 - 1.30 MG/DL 3.36(H) 4.28(H) 3.17(H) - 2.51(H) 2.88(H) 3.53(H)   Sodium 136 - 145 mmol/L 137 138 138 - 139 137 137   Potassium 3.5 - 5.1 mmol/L 3.5 3.9 3.8 - 3.8 4.1 5.0   TSH 0.36 - 3.74 uIU/mL - - - - - - -   PSA 0.01 - 4.0 ng/mL - - - - - - -   LDH 85 - 241 U/L 259(H) 256(H) 272(H) - 291(H) 342(H) 357(H)   Some recent data might be hidden     Lab Results   Component Value Date/Time    TSH 3.52 01/28/2023 05:26 AM    TSH 2.12 12/27/2022 02:36 PM    TSH 4.80 (H) 12/06/2022 03:53 AM    TSH 5.39 (H) 10/12/2022 09:10 AM    TSH 3.53 02/03/2022 11:47 AM    TSH 5.790 (H) 11/21/2019 04:45 PM    TSH 3.08 06/22/2018 01:53 PM    TSH 4.250 05/26/2015 09:43 AM       ALLERGY:  Allergies   Allergen Reactions    Ambien [Zolpidem] Other (comments)     Causes extreme confusion        CURRENT MEDICATIONS:    Current Facility-Administered Medications:     [START ON 3/15/2023] levETIRAcetam (KEPPRA) injection 250 mg, 250 mg, IntraVENous, Once per day on Mon Wed Fri, Jadiel Galvan MD    cinacalcet (SENSIPAR) tablet 60 mg, 60 mg, Oral, BID WITH MEALS, Jadiel Galvan MD    insulin NPH (NOVOLIN N, HUMULIN N) injection 10 Units, 10 Units, SubCUTAneous, Q12H, Cathy Sheldon CNS, 10 Units at 03/14/23 0934    albumin human 25% (BUMINATE) solution 12.5 g, 12.5 g, IntraVENous, Q12H, Lizette Linton NP, 12.5 g at 03/14/23 0848    oxyCODONE IR (ROXICODONE) tablet 5 mg, 5 mg, Oral, Q4H PRN, Kel Bustos, DO    oxyCODONE IR (ROXICODONE) tablet 10 mg, 10 mg, Oral, Q4H PRN, Joceline HA, DO    pantoprazole (PROTONIX) 40 mg in 0.9% sodium chloride 10 mL injection, 40 mg, IntraVENous, DAILY, Walker Higgins MD, 40 mg at 03/14/23 0848    labetaloL (NORMODYNE;TRANDATE) injection 5 mg, 5 mg, IntraVENous, Q4H PRN, Neha MOLINA MD, 5 mg at 03/09/23 0307    [Held by provider] heparin 25,000 units in D5W 250 ml infusion, 400 Units/hr, IntraVENous, CONTINUOUS, Lizette Linton NP, Stopped at 03/10/23 1350    0.9% sodium chloride infusion, 14 mL/hr, IntraVENous, CONTINUOUS, Neha MOLINA MD, Last Rate: 14 mL/hr at 03/09/23 0757, 14 mL/hr at 03/09/23 0757    levETIRAcetam (KEPPRA) injection 500 mg, 500 mg, IntraVENous, DAILY, Walker Higgins MD, 500 mg at 03/14/23 0849    albumin human 25% (BUMINATE) solution 25 g, 25 g, IntraVENous, DIALYSIS PRN, Yeny Lee MD, 25 g at 03/13/23 0539    insulin lispro (HUMALOG) injection, , SubCUTAneous, Q6H, Neha MOLINA MD, 1 Units at 03/14/23 0040    hydrALAZINE (APRESOLINE) 20 mg/mL injection 5 mg, 5 mg, IntraVENous, Q6H PRN, Junior Eda MD, 5 mg at 03/10/23 1316    hydrALAZINE (APRESOLINE) tablet 5 mg, 5 mg, Oral, PRN, Junior Eda MD    aspirin chewable tablet 81 mg, 81 mg, Per NG tube, DAILY, Livier Saldana MD, 81 mg at 03/14/23 0848    epoetin heidy-epbx (RETACRIT) injection 10,000 Units, 10,000 Units, SubCUTAneous, Q TUE, THU & SAT, Abou-Assi, Dony Parekh MD, 10,000 Units at 03/11/23 2119    EPINEPHrine (ADRENALIN) 10 mg in 0.9% sodium chloride 250 mL infusion, 0-10 mcg/min, IntraVENous, TITRATE, Belkis Johnson MD, Stopped at 03/05/23 0920    dextrose (D50W) injection syrg 25 g, 25 g, IntraVENous, PRN, Sonny Stovall MD, 9 mL at 02/25/23 1225    NOREPINephrine (LEVOPHED) 8 mg in 5% dextrose 250mL (32 mcg/mL) infusion, 0.5-16 mcg/min, IntraVENous, TITRATE, Belkis Johnson MD, Last Rate: 1.9 mL/hr at 03/14/23 0830, 1 mcg/min at 03/14/23 0830    colchicine tablet 0.6 mg, 0.6 mg, Oral, DAILY, Lizette Linton NP, 0.6 mg at 03/14/23 0848    heparin (porcine) 1,000 unit/mL injection 1,700 Units, 1,700 Units, InterCATHeter, DIALYSIS PRN, 1,700 Units at 03/13/23 0819 **AND** heparin (porcine) 1,000 unit/mL injection 1,500 Units, 1,500 Units, InterCATHeter, DIALYSIS PRN, Seamus Perez, DO, 1,500 Units at 03/13/23 0819    balsam peru-castor oiL (VENELEX) ointment, , Topical, BID, Thalia Lynch MD, Given at 03/14/23 0849    acetaminophen (TYLENOL) solution 650 mg, 650 mg, Oral, Q4H PRN, Jenna Cope MD, 650 mg at 02/20/23 0401    acetaminophen (TYLENOL) suppository 650 mg, 650 mg, Rectal, Q4H PRN, Jenna Cope MD, 650 mg at 02/17/23 2013    HYDROmorphone (DILAUDID) injection 0.5 mg, 0.5 mg, IntraVENous, Q3H PRN, Floyd MOLINA MD, 0.5 mg at 03/09/23 1225    HYDROmorphone (DILAUDID) injection 1 mg, 1 mg, IntraVENous, Q4H PRN, Floyd MOLINA MD, 1 mg at 03/14/23 0848    [Held by provider] heparin 25,000 units in D5W 250 ml infusion, 12-25 Units/kg/hr, IntraVENous, TITRATE, Malinda Mchugh MD, Stopped at 03/08/23 0515    glucose chewable tablet 16 g, 4 Tablet, Oral, PRN, Shaila, Lizette B, NP    glucagon (GLUCAGEN) injection 1 mg, 1 mg, IntraMUSCular, PRN, Shaila, Lizette B, NP    sodium chloride (NS) flush 5-40 mL, 5-40 mL, IntraVENous, Q8H, Shaila, Lizette B, NP, 30 mL at 03/14/23 1540    sodium chloride (NS) flush 5-40 mL, 5-40 mL, IntraVENous, PRN, Shaila, Lizette B, NP, 40 mL at 02/17/23 1818    naloxone (NARCAN) injection 0.4 mg, 0.4 mg, IntraVENous, PRN, Shaila, Lizette B, NP    ELECTROLYTE REPLACEMENT NOTE: Nurse to review Serum Potassium and Magnesuim levels and Initiate Electrolyte Replacement Protocol as needed, 1 Each, Other, PRN, Shaila, Lizette B, NP    dextrose 10 % infusion 0-250 mL, 0-250 mL, IntraVENous, PRN, Shaila, Lizette B, NP, Last Rate: 150 mL/hr at 02/24/23 0220, 75 mL at 02/24/23 0220    acetaminophen (TYLENOL) tablet 650 mg, 650 mg, Oral, Q6H PRN, Shaila, Lizette B, NP, 650 mg at 03/13/23 2114    ondansetron (Bri Seen) injection 4 mg, 4 mg, IntraVENous, Q4H PRN, Shaila, Lizette B, NP, 4 mg at 03/09/23 1212    albuterol-ipratropium (DUO-NEB) 2.5 MG-0.5 MG/3 ML, 3 mL, Nebulization, Q6H PRN, Shaila, Lizette B, NP    bisacodyL (DULCOLAX) suppository 10 mg, 10 mg, Rectal, DAILY PRN, Shaila, Lizette B, NP, 10 mg at 02/22/23 0839    0.45% sodium chloride infusion, 10 mL/hr, IntraVENous, CONTINUOUS, Danuta Viveros MD, Stopped at 03/10/23 1707    dexmedeTOMidine in 0.9 % NaCl (PRECEDEX) 400 mcg/100 mL (4 mcg/mL) infusion soln, 0.1-1.5 mcg/kg/hr, IntraVENous, TITRATE, Master Johnson MD, Stopped at 03/08/23 2300    PATIENT CARE TEAM:  Patient Care Team:  Macrina Constantino MD as PCP - General (Family Medicine)  Macrina Constantino MD as PCP - [facility]  Nani Azul MD (Cardiovascular Disease Physician)  Angella White MD (Gastroenterology)  Kapil Pena MD (Cardiothoracic Surgery)  Christian Retana MD (Cardiovascular Disease Physician)  Cuco Smith MD (Nephrology)  Isaías Hernández MD (Pulmonary Disease)  Danuta Viveros MD ([address] [place] [address] Vascular Surgery)     Thank you for allowing me to participate in this patient's care.     Catherine Bates MD   [address] [place] [address], [address]  Phone: [phone]    Critically ill  Critical care 60 min, include family meeting

## 2023-03-14 NOTE — PROGRESS NOTES
Name: Rosibel Locke   MRN: [id number]  : 1950      Assessment:  TANNER on CKD-3a: Suspect cardiorenal effects initially. Now has LVAD and  POST OP ATN. Anuric->Requiring CRRT>>now iHD    hypercalcemia/immobilization- also has elevated PTH; may have component of 1ry vs secondary HPT; s/p Calcitonin x 4 doses for high Ca    Possible pancreatitis- with elevated lipase; Hypercalcemia can contribute  Ischemic CM: Recurrent exacerbations. EF 20%. S/p LVAD on 2/15. Required Impella RP for RV support  CVA  Sepsis  BPH   Well differentiated NET Grade 1: noted on EGD 23  Anemia 2 to CKD:  s/p PRBCS  Left UE basilic and radial vein thrombus  Thrombocytopenia  Myopathy     Was on CVVH. Transition to IHD on 3/6/2023. Calcium is improved. Got iUF yesterday for volume removal   His left IJ Hermelindo catheter was replaced on 3/11/2023 by intensivist  Calcium remains elevated-> wonder if this too is contributing to myopathy    Plan/Recommendations:  HD tomorrow-> 2Ca bath  Will consider alternate day PUF/HD as well  Ct Sensipar- increase to 60mg BID  Repeat iPTH in am  Will hold off on IV Zometa or Pamidronate as last resort  BRIGHT      Subjective:  Afebrile. Working with PT. Wife at bedside. HD earlier today. 1L UF tolerated.  Required low dose Levophed    ROS:   UTO    Exam:  Visit Vitals  BP (!) 110/58   Pulse 98   Temp 100.3 °F (37.9 °C)   Resp 28   Ht 5' 6\" (1.676 m)   Wt 63.5 kg (139 lb 15.9 oz)   SpO2 91%   BMI 22.60 kg/m²     NAD/Frail  +icterus  +trach  Tr edema-improved  + Jaundice    Current Facility-Administered Medications   Medication Dose Route Frequency Last Admin    [START ON 3/15/2023] levETIRAcetam (KEPPRA) injection 250 mg  250 mg IntraVENous Once per day on       cinacalcet (SENSIPAR) tablet 60 mg  60 mg Oral BID WITH MEALS      insulin NPH (NOVOLIN N, HUMULIN N) injection 10 Units  10 Units SubCUTAneous Q12H 10 Units at 03/14/23 0934    albumin human 25% (BUMINATE) solution 12.5 g  12.5 g IntraVENous Q12H 12.5 g at 03/14/23 0848    oxyCODONE IR (ROXICODONE) tablet 5 mg  5 mg Oral Q4H PRN      oxyCODONE IR (ROXICODONE) tablet 10 mg  10 mg Oral Q4H PRN      pantoprazole (PROTONIX) 40 mg in 0.9% sodium chloride 10 mL injection  40 mg IntraVENous DAILY 40 mg at 03/14/23 0848    labetaloL (NORMODYNE;TRANDATE) injection 5 mg  5 mg IntraVENous Q4H PRN 5 mg at 03/09/23 0307    [Held by provider] heparin 25,000 units in D5W 250 ml infusion  400 Units/hr IntraVENous CONTINUOUS Stopped at 03/10/23 1350    0.9% sodium chloride infusion  14 mL/hr IntraVENous CONTINUOUS 14 mL/hr at 03/09/23 0757    levETIRAcetam (KEPPRA) injection 500 mg  500 mg IntraVENous DAILY 500 mg at 03/14/23 0849    albumin human 25% (BUMINATE) solution 25 g  25 g IntraVENous DIALYSIS PRN 25 g at 03/13/23 0539    insulin lispro (HUMALOG) injection   SubCUTAneous Q6H 1 Units at 03/14/23 0040    hydrALAZINE (APRESOLINE) 20 mg/mL injection 5 mg  5 mg IntraVENous Q6H PRN 5 mg at 03/10/23 1316    hydrALAZINE (APRESOLINE) tablet 5 mg  5 mg Oral PRN      aspirin chewable tablet 81 mg  81 mg Per NG tube DAILY 81 mg at 03/14/23 0848    epoetin heidy-epbx (RETACRIT) injection 10,000 Units  10,000 Units SubCUTAneous Q TUE, THU & SAT 10,000 Units at 03/11/23 2119    EPINEPHrine (ADRENALIN) 10 mg in 0.9% sodium chloride 250 mL infusion  0-10 mcg/min IntraVENous TITRATE Stopped at 03/05/23 0920    dextrose (D50W) injection syrg 25 g  25 g IntraVENous PRN 9 mL at 02/25/23 1225    NOREPINephrine (LEVOPHED) 8 mg in 5% dextrose 250mL (32 mcg/mL) infusion  0.5-16 mcg/min IntraVENous TITRATE 1 mcg/min at 03/14/23 0830    colchicine tablet 0.6 mg  0.6 mg Oral DAILY 0.6 mg at 03/14/23 0848    heparin (porcine) 1,000 unit/mL injection 1,700 Units  1,700 Units InterCATHeter DIALYSIS PRN 1,700 Units at 03/13/23 0819    And heparin (porcine) 1,000 unit/mL injection 1,500 Units  1,500 Units InterCATHeter DIALYSIS PRN 1,500 Units at 03/13/23 0819    balsam peru-castor oiL (VENELEX) ointment   Topical BID Given at 03/14/23 0849    acetaminophen (TYLENOL) solution 650 mg  650 mg Oral Q4H  mg at 02/20/23 0401    acetaminophen (TYLENOL) suppository 650 mg  650 mg Rectal Q4H  mg at 02/17/23 2013    HYDROmorphone (DILAUDID) injection 0.5 mg  0.5 mg IntraVENous Q3H PRN 0.5 mg at 03/09/23 1225    HYDROmorphone (DILAUDID) injection 1 mg  1 mg IntraVENous Q4H PRN 1 mg at 03/14/23 0848    [Held by provider] heparin 25,000 units in D5W 250 ml infusion  12-25 Units/kg/hr IntraVENous TITRATE Stopped at 03/08/23 0515    glucose chewable tablet 16 g  4 Tablet Oral PRN      glucagon (GLUCAGEN) injection 1 mg  1 mg IntraMUSCular PRN      sodium chloride (NS) flush 5-40 mL  5-40 mL IntraVENous Q8H 30 mL at 03/14/23 1540    sodium chloride (NS) flush 5-40 mL  5-40 mL IntraVENous PRN 40 mL at 02/17/23 1818    naloxone (NARCAN) injection 0.4 mg  0.4 mg IntraVENous PRN      ELECTROLYTE REPLACEMENT NOTE: Nurse to review Serum Potassium and Magnesuim levels and Initiate Electrolyte Replacement Protocol as needed  1 Each Other PRN      dextrose 10 % infusion 0-250 mL  0-250 mL IntraVENous PRN 75 mL at 02/24/23 0220    acetaminophen (TYLENOL) tablet 650 mg  650 mg Oral Q6H  mg at 03/13/23 2114    ondansetron (ZOFRAN) injection 4 mg  4 mg IntraVENous Q4H PRN 4 mg at 03/09/23 1212    albuterol-ipratropium (DUO-NEB) 2.5 MG-0.5 MG/3 ML  3 mL Nebulization Q6H PRN      bisacodyL (DULCOLAX) suppository 10 mg  10 mg Rectal DAILY PRN 10 mg at 02/22/23 0839    0.45% sodium chloride infusion  10 mL/hr IntraVENous CONTINUOUS Stopped at 03/10/23 1707    dexmedeTOMidine in 0.9 % NaCl (PRECEDEX) 400 mcg/100 mL (4 mcg/mL) infusion soln  0.1-1.5 mcg/kg/hr IntraVENous TITRATE Stopped at 03/08/23 2300       Labs/Data:    Lab Results   Component Value Date/Time WBC 8.0 03/14/2023 03:29 AM    HGB 8.3 (L) 03/14/2023 03:29 AM    HCT 25.7 (L) 03/14/2023 03:29 AM    PLATELET 350 93/68/0263 03:29 AM    MCV 98.8 03/14/2023 03:29 AM       Lab Results   Component Value Date/Time    Sodium 137 03/14/2023 03:29 AM    Potassium 3.5 03/14/2023 03:29 AM    Chloride 104 03/14/2023 03:29 AM    CO2 19 (L) 03/14/2023 03:29 AM    Anion gap 14 03/14/2023 03:29 AM    Glucose 197 (H) 03/14/2023 03:29 AM    BUN 62 (H) 03/14/2023 03:29 AM    Creatinine 3.36 (H) 03/14/2023 03:29 AM    BUN/Creatinine ratio 18 03/14/2023 03:29 AM    GFR est AA 46 (L) 06/23/2022 09:40 AM    GFR est non-AA 38 (L) 06/23/2022 09:40 AM    eGFR 19 (L) 03/14/2023 03:29 AM    eGFR 69 01/25/2023 11:55 AM    Calcium 11.9 (H) 03/14/2023 03:29 AM       Wt Readings from Last 3 Encounters:   03/14/23 63.5 kg (139 lb 15.9 oz)   01/25/23 55.8 kg (123 lb)   01/23/23 54.9 kg (121 lb)         Intake/Output Summary (Last 24 hours) at 3/14/2023 1659  Last data filed at 3/14/2023 0600  Gross per 24 hour   Intake 876.01 ml   Output --   Net 876.01 ml         Patient seen and examined. Chart reviewed. Labs, data and other pertinent notes reviewed in last 24 hrs.     PMH/SH/FH reviewed and unchanged compared to H&P    Discussed case with medical team and patient's family      Hugo Sever, MD  [id number] [employer]

## 2023-03-14 NOTE — PROGRESS NOTES
0800: Assumed care of pt and  assessment done. 1100: wife here to visit spoke with Intensivest also,. Wife told nurse she knew her  was giving up and she wanted to speak to Dr. Chanelle Valero, Dr. Ramana Lr and Dr. Jovon Cope. Pt does respond to some things but mostly pain. 1200: Pt 's wife is in room sobbing. Dr. Ramana Lr here along with  Dr. Jovon Cope and the intensivist to discuss pt condition. A meeting will take place with Palliative and the providers later this week. 1400: Pt is resting fairly well but not very responsive. 1930: Bedside and Verbal shift change report given to [id number] [facility]  (oncoming nurse) by Supriya Pittman RN (offgoing nurse). Report included the following information Procedure Summary, Intake/Output, MAR, Recent Results, Med Rec Status, and Cardiac Rhythm NSR  .

## 2023-03-14 NOTE — PROGRESS NOTES
CRITICAL CARE NOTE      Name: Leandra Carver   : 1950   MRN: [id number]   Date: 3/14/2023      Reason for ICU Admission: Acute on chronic HFrEF     ICU PROBLEM LIST   Acute on chronic HFrEF (20%) NYHA IIIb/IV (D) on home inotropic support, life vest s/p LVAD  TANNER on CKD3, on CRRT  CHB post op, resolved  Aflutter w/ RVR  Acute on chronic hypoxemic respiratory failure, s/p trach placement  CAP  Gastric neuroendocrine tumor  Hx of LUE DVT  DM  PAD  TRISTAN  BPH w/ urinary retention requiring chronic donnelly    HISTORY OF PRESENT ILLNESS:   In brief, A 71year old male PMH as noted above admitted to [facility] on  due to ongoing symptoms secondary to his HFrEF. Treated for possible CAP, and diuresed for acute on chronic HFrEF. Nephrology following for TANNER on CKD 3. AHF team recommended transfer to CVICU for [facility] placement and ongoing LVAD workup, with placement 2/15. Pt extubated , however with development of worsening renal dysfunction overnight and unable to tolerate CRRT so was reintubated and taken for Impella RP placement for right-sided support in a.m. of  and CRRT resumed. Impella removed on 3/1. Overnight 3/1-3/1 CVA noted with SAH.  3/2 SAH enlarged. Held anticoagulation. Failed extubation 3/2. Trach placed 3/7. Transitioned to [facility]. Repeat CTH 3/10 w/ persistent SAH, heparin held. 24 HOUR EVENTS:   No new events overnight. On levophed 4 royer. Dialysis in progress. Unresponsive. Unable to tolerate vent weaning. ASSESSMENT AND PLANS:   In brief, A 71year old male PMH as noted above admitted to [facility] on  due to ongoing symptoms secondary to his HFrEF with SAH, s/p trach, dialysis. NEUROLOGICAL:    Acute encephalopathy partially due to [facility]  Remains unresponsive  Possible Critical illness polyneuropathy?    -Off sedatives  -Repeat Head CT on 3/11 with stable SAH.   -Off anticoagulation   -Delirium precautions      PULMONOLOGY:   Acute hypoxic respiratory failure.  Failed extubation  S/p trach on 3/7    -Unable to tolerate vent weaning. Remains vent dependent  -Lung protective ventilation, on minimal vent support at this time  -Monitor bleeding at trach site      CARDIOVASCULAR:   Ischemic and non-ischemic cardiomyopathy with LVEF at 25-30%  S/p LVAD on 2/15/2023  Shock, undifferentiated. Cardiac? Sepsis? S/p impella RP, removed on 3/1  Hepatocongestion  Cardiorenal syndrome    -MAP goal >65 <85, PRN vasopressors  -ECHO 3/10 Right ventricle is moderately dilated. Mildly reduced systolic function LVID and TAPSE improved. -C/w ASA, statin  -Goal euvolemia  -Unable to tolerate BB, ARNI/ARB due to hypotension      GASTROINTESTINAL:   Hepatocongestion  Hyperbilirubinemia  Elevated Lipase without evidence of pancreatitis on ABD CT  Concern for Cirrhosis    -Will continue feeding via  tube feeds  -PPI   -US ABD: unable to assess pancreas due to intestinal air, evidence of chronic hepatic congestion and possible cirrhosis      RENAL/ELECTROLYTE/FLUIDS:   Aucte on Chronic renal failure. Was on CRRT, now iHD    -Strict I/Os,  goal euvolemic  -iHD on 3/13  -HD per nephrology. Appreciate assist  -Monitor RFP  -Mg/Phos/K >2/3/4    ENDOCRINE:   SSI, on basal insulin  Hypoglycemic protocol  Glucose goal 120-180    HEMATOLOGY/ONCOLOGY:   Acute on chronic anemia  Gastric neuroendocrine tumor, well differentiated, good prognosis per onc.     -Off heparin drip  -Hemoglobin goal greater than 7, platelet goal greater than 50  -EPO weekly    ID/MICRO:   Ucx/Bcx NGTD  -Off cefepime  -Urine with Candida s/p fluconazole    ICU DAILY CHECKLIST     Code Status:Full  DVT Prophylaxis: SCDs  SUP: PPI  Diet: TF  Activity Level:ad jose f  ABCDEF Bundle/Checklist Completed:Yes  Disposition: Stay in ICU  Multidisciplinary Rounds Completed:  yes  Patient/Family Updated: Yes    I had a family meeting today, Ms Kendall at bedside and two children on the phone. I updated them about his condition.  I explained that he remains vent dependent, dialysis dependent. He remains on pressor without evidence of infectious process. His mental status continues to deteriorate. Based on the discussion, my understanding is that patient would not want to continue on life-support and in fact, he was a DNR but it was resented due to LVAD procedure. The family would like to have a discussion with the nephrologist and advanced cardiac teams before proceeding with comfort care. I completely support their decision as the chance of meaningful recovery is slim to none. The RN was notified regarding the family wish and request.     Edilia Collins   2/3 Transferred to CVICU for [facility] placement   started on dobutamine as adjunct to milrinone  2/15 LVAD implant   extubated   Impella RP placement  3/1 Impella removed  3/1- SAH on CT  3/3 RE-intubated  3/6 Blakes removed, transitioned to HD  3/7 Trach placment  3/10 persistent SAH, heparin stopped  3/11 HD catheter replaced    SUBJECTIVE:     As noted above    Review of Systems:     Unable to perform due to patient trached and mental status    OBJECTIVE:     Labs and Data: Reviewed 23  Medications: Reviewed 23  Imaging: Reviewed 23    General - unresponsive, chronically ill appearing  HEENT- pupils equal, no nystagmus, jaundiced sclerea  Neuro - unresponsive  CV - Paced,  post sternotomy  Resp - trached, good airflow bilateral without wheezing or rales.  Decreased at bases  Abd - soft, not distended, nontender  MSK- intact, no sacral ulcer, anasarca  SKIN: juandiced    Visit Vitals  BP (!) 110/58   Pulse 93   Temp 98.2 °F (36.8 °C)   Resp 18   Ht 5' 6\" (1.676 m)   Wt 63.5 kg (139 lb 15.9 oz)   SpO2 100%   BMI 22.60 kg/m²    O2 Flow Rate (L/min): 20 l/min O2 Device: Tracheostomy, Ventilator Temp (24hrs), Av °F (37.2 °C), Min:98 °F (36.7 °C), Max:100.6 °F (38.1 °C)    CVP (mmHg): 127 mmHg (23 0600)      Intake/Output:     Intake/Output Summary (Last 24 hours) at 3/14/2023 0650  Last data filed at 3/14/2023 0600  Gross per 24 hour   Intake 1452.9 ml   Output 1001 ml   Net 451.9 ml       Imaging  All images and labs were reviewed by me personally. 01/26/23    ECHO ADULT FOLLOW-UP OR LIMITED 01/27/2023 1/27/2023    Interpretation Summary    Left Ventricle: EF by visual approximation is 20%. Left ventricle is mildly dilated. Mitral Valve: Moderately thickened leaflet, at the anterior and posterior leaflets. Moderately calcified leaflet. Moderate regurgitation. Left Atrium: Left atrium is moderately dilated. Technical qualifiers: Echo study was technically difficult with poor endocardial visualization. Contrast used: Definity. Limited study, not all structures viewed    Signed by: Merced Hankins MD on 1/27/2023  4:39 PM       Pertinent imaging reviewed and interpreted as noted above    CRITICAL CARE DOCUMENTATION  I had a face to face encounter with the patient, reviewed and interpreted patient data including clinical events, labs, images, vital signs, I/O's, and examined patient. I have discussed the case and the plan and management of the patient's care with the consulting services, the bedside nurses and the respiratory therapist.      NOTE OF PERSONAL INVOLVEMENT IN CARE   This patient has a high probability of imminent, clinically significant deterioration, which requires the highest level of preparedness to intervene urgently. I participated in the decision-making and personally managed or directed the management of the following life and organ supporting interventions that required my frequent assessment to treat or prevent imminent deterioration. I personally spent 35 minutes of critical care time. This is time spent at this critically ill patient's bedside actively involved in patient care as well as the coordination of care. This does not include any procedural time which has been billed separately.     Olga Dia MD  Pulmonary Critical Care Medicine  [facility] [place] [facility]

## 2023-03-14 NOTE — DIABETES MGMT
[facility]  DIABETES MANAGEMENT CONSULT    Consulted by  Sudeep Ryan MD   for advanced nursing evaluation and care for inpatient blood glucose management. Evaluation and Action Plan   Moses Benson is a 71year old gentleman, with Type 2 Diabetes with a recent A1C of 7.7%, who is admitted with arrhythmias and acute on chronic HFrEF with failure to respond to inotrope support. He was discharged one week prior from a prolonged hospital stay for acute on chronic HF and LVAD work-up and discharged home on dobutamine with a lifevest.  Glucose control was tenuous during his previous admission s/t multiple co-morbidities, several tests/procedures requiring NPO status, waxing and waining oral intake. At this time glucose is impacted by:  Heart Failure with reduced EF 25-30%, LVAD implant  Vasopressor use: levo at low rate  HD  Enteral Feeds  Pancreatitis  Fevers    This admission, he required low basal doses but high bolus doses with meals to offset pre-prandial hyperglycemia. An insulin gtt has been used for glycemic control post-operatively with inconsistent insulin rates the first 2 weeks. His insulin rates stabilized and he transitioned off to SQ basal/correction insulin. BG is largely at goal of 140-180mg/dl. Action Plan  Increased NPH to 10 units Q12 as BG trended above goal.  Hold if feeds are held. IF BG remains over goal today, will add the total amt of correction he received the day prior to total basal dose. Humalog at high sensitivity Q6h            Initial Presentation   Moses Benson is a 71 y.o. male who presented to the ED 1/26/23 with lower extremity swelling and difficulty breathing. He had a prolonged hospital admission from 12/22/22-1/19/23 for acute on chronic systolic HF and LVAD work-up. He was discharged with home inotrope.    LAB: , GFR 58, Trop 2003, BNP 4524  CXR: New diffuse bilateral increased interstitial markings, partially obscuring bilateral pulmonary nodules. HX:   Past Medical History:   Diagnosis Date    CAD (coronary artery disease) 11/10/2016    NSTEMI & 2 stents    Deafness 10/28/2012    DM (diabetes mellitus) ([facility]     Elevated cholesterol     Hypertension     NSTEMI (non-ST elevated myocardial infarction) ([facility] 11/10/2016        INITIAL DX:   CHF (congestive heart failure) ([facility] [I50.9]     Current Treatment     TX: Milrinone, Amio. Specialty consultations: [facility], Cardiology, EP, GI     Hospital Course   Clinical progress has been complicated by:    9/96: Admission. Rapid response for SVT- Given adenosine x2, amio bolus/gtt  1/27: Advanced HFC consult. EP consult ordered. Intolerance of inotropic support. Cardiology consult. NSTEMI, heparin gtt started. Seen by EP: Continue amio. 1/28: Febrile: CT chest 1/28 shows diffuse focal opacities concerning for pneumonia. Obtain blood cultures, UA. Pathology report 1/18/23: well differentiated neuroendocrine tumor grade 1. EGD: Patchy erythema gastric body, biopsies done. 6 mm sessile polyp gastric body biopsied  1/30: Oncology consult: Recommend multiphase CT of the abdomen and pelvis to evaluate for metastatic spread. Tachycardia and SOB, BiPAP overnight. 2/3: Accepted for VAD implant if renal fx improves per [facility]. CCU Transfer and swan placed  2/4: PRBC transfusion   2/6: With increased dyspnea. Doubtamine started  2/15: LVAD Implant  2/17: Extubated. CRRT started. ECHO with persistent RV failure. OR for RV impella. Remained Intubated post-op  2/20: UA with large leuk esterase and 2+ bacteria. Urine Cx with yeast  2/21: PRBC transfusion, PLT transfusion    2/22: Bronch   3/2: Code Stroke. Head CT Occlusion distal right anterior cerebral artery, with associated small area of matched perfusion defect and cortical enhancement, consistent with subacute infarct.  Diminutive and irregular right vertebral artery, occluded at the V3-4 junction, age indeterminate extensive multifocal atherosclerosis in the intracranial and extracranial circulation. Partly visualized life support lines and tubes. Postsurgical changes in the chest partly visualized. Bilateral pleural effusions. No venous thrombus. Extubated, and re-intubated   3/3: Pancreatitis   3/7: Trach placement. Bronchoscopy. Elevated Lactate, New CVL palced. 3/9: PRBC Transfusion   3/13: Repeat Head CT: Trace right parafalcine subarachnoid hemorrhage is not changed. Chronic microvascular ischemic change and cerebral atrophy. Febrile  Diabetes History   Type 2 Diabetes  Ambulatory BG management provided by: PCP Benja Watters MD    Diabetes-related Medical History  Acute complications  Acute hyperglycemia  Neurological complications  Peripheral neuropathy  Microvascular disease  Nephropathy  Macrovascular disease  CAD  Other associated conditions                                       CHF     Diabetes Medication History  Key Antihyperglycemic Medications        Diabetes Medication History  Key Antihyperglycemic Medications               metFORMIN (GLUCOPHAGE) 500 mg tablet (Taking/) Take 1 Tablet by mouth two (2) times daily (with meals) for 30 days.     glipiZIDE (GLUCOTROL) 5 mg tablet (Taking) Take 1 tablet by mouth twice daily    Januvia 50 mg tablet (Taking) Take 1 tablet by mouth once daily             Diabetes self-management practices:   Eating pattern                Eats 3 small meals daily  [x]         Breakfast                         2 Eggs, Coffee  [x]         Lunch                               [place]  [x]         Dinner                              \" Food\" Bread, rice, lentils   [x]         Bedtime                           [place]  [x]         Snacks                              Afternoon snack  [x]         Beverages                        Water, Coffee  Physical activity pattern                Sedentary   Monitoring pattern  Discharged last week with a [place] CGM and serum glucometer  7 day average Ba-9a: 129-164  9a-12: 243  Noon to 9p: 198-238  1 low BG in the last week overnight    Taking medications pattern  [x]         Consistent administration  [x]         Affordable  Social determinants of health impacting diabetes self-management practices   Concerned that you need to know more about how to stay healthy with diabetes  Overall evaluation:                 [x]         Achieving A1c target with drug therapy & self-care practices    Subjective     Objective   Physical exam  General Underweight [place] [facility] male in critical condition in CVICU. Trach. LVAD. Neuro  Resting in bed. Not interactive/not following commands. Vital Signs Visit Vitals  BP (!) 110/58   Pulse 93   Temp 98.2 °F (36.8 °C)   Resp 18   Ht 5' 6\" (1.676 m)   Wt 63.5 kg (139 lb 15.9 oz)   SpO2 100%   BMI 22.60 kg/m²     Skin  Warm and dry. No acanthosis noted along neckline. Sternal surgical incision. LVAD  Heart   Regular rate and rhythm.  No murmurs, rubs or gallops  Lungs  Clear to auscultation without rales or rhonchi  Extremities No foot wounds        Laboratory  Recent Labs     23  0329 23  0327 23  0506   * 205* 200*   AGAP 14 14 12   WBC 8.0 8.1 7.1   CREA 3.36* 4.28* 3.17*   * 177* 187*   * 114* 110*         Factors impacting BG management  Factor Dose Comments   Nutrition:  Standard meals     Enteral feeds  Vital 1.5 @ 40 ml/hr  165g CHO/24h      Heart Failure/Cardiogenic Shock HeartMate 3 LVAD  BNP 03524  Low dose norepi off         Lactic acidosis Lac 1.8    Other:   Kidney function TANNER on CKD:   GFR 14      Transaminitis     Alk Phos 288    Pancreatitis Lipase >3000      Blood glucose pattern    Significant diabetes-related events over the past 24-72 hours  A1C 7.7% 23  24h BG pattern: 168-221  NPH: 10 units Q12   Correction: 3 units in the last 24h  Lipase >3000  Febrile overnight  Levo off    Assessment and Nursing Intervention   Nursing Diagnosis Risk for unstable blood glucose pattern   Nursing Intervention Domain 6109 Decision-making Support   Nursing Interventions Examined current inpatient diabetes/blood glucose control   Explored factors facilitating and impeding inpatient management  Explored corrective strategies with patient and responsible inpatient provider   Informed patient of rational for insulin strategy while hospitalized     Billing Code(s)   No charge    Before making these care recommendations, I personally reviewed the hospitalization record, including notes, laboratory & diagnostic data and current medications, and examined the patient at the bedside (circumstances permitting) before determining care. More than fifty (50) percent of the time was spent in patient counseling and/or care coordination.   Total minutes: 10    SLICK Zaidi  Diabetes Clinical Nurse Specialist  [facility]  Access via [employer]

## 2023-03-15 NOTE — PROGRESS NOTES
SLP Contact Note    Noted vent settings now outside of in-line parameters. Will hold SLP for now.     Thank you,  DORY Turk.Ed, [id number] [facility]  Speech-Language Pathologist

## 2023-03-15 NOTE — PROGRESS NOTES
Bedside and Verbal shift change report given to Adam Brown Yuki (oncoming nurse) by Buddy Barba (offgoing nurse). Report included the following information SBAR, OR Summary, Intake/Output, and MAR. Nephrologist rounding, will switch pt to CRRT instead of HD today    MAP 65-70 on 3 levo and CRRT, LVAD flow >3.2    LVAD dressing change    Trach care performed, inner cannula changed    Bedside and Verbal shift change report given to Buddy Barba (oncoming nurse) by Javier Olivera RN (offgoing nurse). Report included the following information SBAR, OR Summary, Intake/Output, MAR, and Cardiac Rhythm NSR .

## 2023-03-15 NOTE — PROGRESS NOTES
2000: Bedside and Verbal shift change report given to Mina Bill RN (oncoming nurse) by Chelsie Butcher RN (offgoing nurse). Report included the following information SBAR, Kardex, and Recent Results. 2003: Pt MAPs 55-60. Levophed drip restarted at 1 mcg/min. Scheduled albumin administered early to aid in blood pressure support. 0330: Morning labs completed. 0430: chg bath completed    0800: Bedside and Verbal shift change report given to Diana Verduzco RN (oncoming nurse) by Mina Bill RN (offgoing nurse). Report included the following information Recent Results.

## 2023-03-15 NOTE — PROGRESS NOTES
Name: Minna Acosta   MRN: [id number]  : 1950      Assessment:  TANNER on CKD-3a: Suspect cardiorenal effects initially. Now has LVAD and  POST OP ATN. Anuric->Requiring CRRT>>now iHD    hypercalcemia/immobilization- also has elevated PTH; may have component of 1ry vs secondary HPT; s/p Calcitonin x 4 doses for high Ca    Possible pancreatitis- with elevated lipase; Hypercalcemia can contribute  Ischemic CM: Recurrent exacerbations. EF 20%. S/p LVAD on 2/15. Required Impella RP for RV support  CVA  Sepsis  BPH   Well differentiated NET Grade 1: noted on EGD 23  Anemia 2 to CKD:  s/p PRBCS  Left UE basilic and radial vein thrombus  Thrombocytopenia  Myopathy  Hypermagnesemia     Was on CVVH. Transition to IHD on 3/6/2023. Calcium is improved. Got iUF yesterday for volume removal   His left IJ Hermelindo catheter was replaced on 3/11/2023 by intensivist  Calcium remains elevated-> wonder if this too is contributing some to his myopathy    Plan/Recommendations:  Change to CVVH-> 4K Prismasol bags. Factor rate 50cc/hr  Ct Sensipar- increased to 60mg BID yesterday  GI consult to evaluate persistent hyperbilirubinemia/elevated Lipase  Hold Colchicine  BRIGHT      Subjective:  RN at bedside. On low dose Levophed.  No events overnight    ROS:   UTO    Exam:  Visit Vitals  BP (!) 110/58   Pulse 97   Temp 99.8 °F (37.7 °C)   Resp 22   Ht 5' 6\" (1.676 m)   Wt 63.8 kg (140 lb 10.5 oz)   SpO2 99%   BMI 22.70 kg/m²     NAD/Frail  +icterus  +trach  Abd distension  Tr edema-improved  + Jaundice    Current Facility-Administered Medications   Medication Dose Route Frequency Last Admin    levETIRAcetam (KEPPRA) injection 250 mg  250 mg IntraVENous Once per day on       cinacalcet (SENSIPAR) tablet 60 mg  60 mg Oral BID WITH MEALS 60 mg at 03/15/23 0855    insulin NPH (NOVOLIN N, HUMULIN N) injection 10 Units  10 Units SubCUTAneous Q12H 10 Units at 03/15/23 0855    albumin human 25% (BUMINATE) solution 12.5 g  12.5 g IntraVENous Q12H 12.5 g at 03/15/23 0854    oxyCODONE IR (ROXICODONE) tablet 5 mg  5 mg Oral Q4H PRN 5 mg at 03/15/23 8142    oxyCODONE IR (ROXICODONE) tablet 10 mg  10 mg Oral Q4H PRN      pantoprazole (PROTONIX) 40 mg in 0.9% sodium chloride 10 mL injection  40 mg IntraVENous DAILY 40 mg at 03/14/23 0848    labetaloL (NORMODYNE;TRANDATE) injection 5 mg  5 mg IntraVENous Q4H PRN 5 mg at 03/09/23 0307    [Held by provider] heparin 25,000 units in D5W 250 ml infusion  400 Units/hr IntraVENous CONTINUOUS Stopped at 03/10/23 1350    0.9% sodium chloride infusion  14 mL/hr IntraVENous CONTINUOUS 14 mL/hr at 03/09/23 0757    levETIRAcetam (KEPPRA) injection 500 mg  500 mg IntraVENous DAILY 500 mg at 03/15/23 0855    albumin human 25% (BUMINATE) solution 25 g  25 g IntraVENous DIALYSIS PRN 25 g at 03/13/23 0539    insulin lispro (HUMALOG) injection   SubCUTAneous Q6H 1 Units at 03/14/23 0040    hydrALAZINE (APRESOLINE) 20 mg/mL injection 5 mg  5 mg IntraVENous Q6H PRN 5 mg at 03/10/23 1316    hydrALAZINE (APRESOLINE) tablet 5 mg  5 mg Oral PRN      aspirin chewable tablet 81 mg  81 mg Per NG tube DAILY 81 mg at 03/14/23 0848    epoetin heidy-epbx (RETACRIT) injection 10,000 Units  10,000 Units SubCUTAneous Q TUE, THU & SAT 10,000 Units at 03/14/23 2245    EPINEPHrine (ADRENALIN) 10 mg in 0.9% sodium chloride 250 mL infusion  0-10 mcg/min IntraVENous TITRATE Stopped at 03/05/23 0920    dextrose (D50W) injection syrg 25 g  25 g IntraVENous PRN 9 mL at 02/25/23 1225    NOREPINephrine (LEVOPHED) 8 mg in 5% dextrose 250mL (32 mcg/mL) infusion  0.5-16 mcg/min IntraVENous TITRATE 3 mcg/min at 03/15/23 0826    [Held by provider] colchicine tablet 0.6 mg  0.6 mg Oral DAILY 0.6 mg at 03/14/23 0848    heparin (porcine) 1,000 unit/mL injection 1,700 Units  1,700 Units InterCATHeter DIALYSIS PRN 1,700 Units at 03/13/23 0819    And    heparin (porcine) 1,000 unit/mL injection 1,500 Units  1,500 Units InterCATHeter DIALYSIS PRN 1,500 Units at 03/13/23 0819    balsam peru-castor oiL (VENELEX) ointment   Topical BID Given at 03/15/23 0856    [Held by provider] acetaminophen (TYLENOL) solution 650 mg  650 mg Oral Q4H  mg at 03/14/23 1924    [Held by provider] acetaminophen (TYLENOL) suppository 650 mg  650 mg Rectal Q4H  mg at 02/17/23 2013    HYDROmorphone (DILAUDID) injection 0.5 mg  0.5 mg IntraVENous Q3H PRN 0.5 mg at 03/15/23 0316    HYDROmorphone (DILAUDID) injection 1 mg  1 mg IntraVENous Q4H PRN 1 mg at 03/14/23 0848    [Held by provider] heparin 25,000 units in D5W 250 ml infusion  12-25 Units/kg/hr IntraVENous TITRATE Stopped at 03/08/23 0515    glucose chewable tablet 16 g  4 Tablet Oral PRN      glucagon (GLUCAGEN) injection 1 mg  1 mg IntraMUSCular PRN      sodium chloride (NS) flush 5-40 mL  5-40 mL IntraVENous Q8H 10 mL at 03/15/23 0631    sodium chloride (NS) flush 5-40 mL  5-40 mL IntraVENous PRN 40 mL at 02/17/23 1818    naloxone (NARCAN) injection 0.4 mg  0.4 mg IntraVENous PRN      ELECTROLYTE REPLACEMENT NOTE: Nurse to review Serum Potassium and Magnesuim levels and Initiate Electrolyte Replacement Protocol as needed  1 Each Other PRN      dextrose 10 % infusion 0-250 mL  0-250 mL IntraVENous PRN 75 mL at 02/24/23 0220    [Held by provider] acetaminophen (TYLENOL) tablet 650 mg  650 mg Oral Q6H  mg at 03/13/23 2114    ondansetron (ZOFRAN) injection 4 mg  4 mg IntraVENous Q4H PRN 4 mg at 03/09/23 1212    albuterol-ipratropium (DUO-NEB) 2.5 MG-0.5 MG/3 ML  3 mL Nebulization Q6H PRN      bisacodyL (DULCOLAX) suppository 10 mg  10 mg Rectal DAILY PRN 10 mg at 02/22/23 0839    0.45% sodium chloride infusion  10 mL/hr IntraVENous CONTINUOUS Stopped at 03/10/23 1707    dexmedeTOMidine in 0.9 % NaCl (PRECEDEX) 400 mcg/100 mL (4 mcg/mL) infusion soln  0.1-1.5 mcg/kg/hr IntraVENous TITRATE Stopped at 03/08/23 2300       Labs/Data:    Lab Results   Component Value Date/Time    WBC 7.6 03/15/2023 03:04 AM    HGB 8.3 (L) 03/15/2023 03:04 AM    HCT 26.2 (L) 03/15/2023 03:04 AM    PLATELET 312 32/79/5478 03:04 AM    .4 (H) 03/15/2023 03:04 AM       Lab Results   Component Value Date/Time    Sodium 136 03/15/2023 03:04 AM    Potassium 3.9 03/15/2023 03:04 AM    Chloride 103 03/15/2023 03:04 AM    CO2 16 (L) 03/15/2023 03:04 AM    Anion gap 17 (H) 03/15/2023 03:04 AM    Glucose 102 (H) 03/15/2023 03:04 AM    BUN 87 (H) 03/15/2023 03:04 AM    Creatinine 4.53 (H) 03/15/2023 03:04 AM    BUN/Creatinine ratio 19 03/15/2023 03:04 AM    GFR est AA 46 (L) 06/23/2022 09:40 AM    GFR est non-AA 38 (L) 06/23/2022 09:40 AM    eGFR 13 (L) 03/15/2023 03:04 AM    eGFR 69 01/25/2023 11:55 AM    Calcium 11.7 (H) 03/15/2023 03:06 AM       Wt Readings from Last 3 Encounters:   03/15/23 63.8 kg (140 lb 10.5 oz)   01/25/23 55.8 kg (123 lb)   01/23/23 54.9 kg (121 lb)         Intake/Output Summary (Last 24 hours) at 3/15/2023 1011  Last data filed at 3/15/2023 0700  Gross per 24 hour   Intake 1196.85 ml   Output --   Net 1196.85 ml         Patient seen and examined. Chart reviewed. Labs, data and other pertinent notes reviewed in last 24 hrs.     PMH/SH/FH reviewed and unchanged compared to H&P    Discussed case with CVICU RN      Heidi Boo MD  [facility]

## 2023-03-15 NOTE — DIALYSIS
CRRT / [phone]    Orders   Mode: CVVH   Blood Flow Rate: 200mL/min   Prismasol Dose: 25ml/kg/hr x 63.1zk=9841gQ/hr (rounded to 1600mL/hr)  800mL/hr PBP  400mlL/hr replacement 2  400mL/hr replacement   Prismasol Concentrate: 4k/2.5Ca   Blood Warmer Temp: 37 degrees C thermax   Net Fluid Removal: 50     Metrics   BP: Arterial BP:102/56   HR: 106   Access Pressure: -48   Filter Pressure: 116   Return Pressure: 37   TMP: 39   Pressure Drop: 42     Access   Type & Location: Left IJ temporary CVC   Comments: dressing intact dated 3/14/23 with scant amount of new bloody drainage                                        Labs   HBsAg (Antigen) / date: negative    3/13/23                                   HBsAb (Antibody) / date: Susceptible 2/18/23   Source: epic     Safety:   Time Out Done:   (Time) Yes 1240   Consent obtained/signed: On file, patient previously on CRRT this admission   Education: Follow up required, patient lethargic   Primary Nurse Rpt Pre: Mariano Bella RN   Primary Nurse Rpt Post: Mariano Bella RN     Comments / Plan:   Arrived to bedside for ordered hemodialysis this am. Prior to initiating HD  updated order for CRRT in place of HD.  When prismasol bags available from pharmacy CVVH was initiated as ordered

## 2023-03-15 NOTE — DIABETES MGMT
Poor neurologic assessment   Persistent pancreatitis and transaminitis   Palliative mtg later this week  24h BG pattern 103-140  Chem: AG 17, Creat 4.53, GFR 13, CO2 16, , , Alk Phos 330, , Lac 1.6, BNP 49060  Levo back on at 3mcg/min  NPH: 10 units Q12    Continue current insulin therapy. If BG sustained below goal (140mg/dl), will reduce dose by 20%. Likewise, hold NPH if feeds are stopped.

## 2023-03-15 NOTE — PROGRESS NOTES
[facility].  [address] [address] [address], [address]  [phone]                     GI PROGRESS NOTE    Patient Name: Jim Warren      : 1950      MRN: [id number]  Admit Date: 2023  Today's Date: 3/15/2023  CC: pancreatitis     Subjective:     Mr. Sisto Olszewski is laying in bed, minimally responsive PT/OT are performing passive ROM. Per RN he is anuric, last BM was several days ago. Tube feeds via DHT. Objective:     Blood pressure (!) 110/58, pulse (!) 102, temperature 99.2 °F (37.3 °C), resp. rate 30, height 5' 6\" (1.676 m), weight 63.8 kg (140 lb 10.5 oz), SpO2 100 %. Physical Exam:  General appearance: minimally responsive. Skin: Extremities and face reveal no rashes. + jaundice   HEENT: Sclerae icteric. Cardiovascular: Regular rate and rhythm. +LVAD hum   Respiratory: Ventilator dependent via trach   GI: Abdomen nondistended, soft. + bowel sounds. DHT in place   Rectal: Deferred   Musculoskeletal: No pitting edema of the lower legs.     Neurological: minimally responsive to pain         Data Review:    Recent Results (from the past 24 hour(s))   GLUCOSE, POC    Collection Time: 23  7:30 PM   Result Value Ref Range    Glucose (POC) 119 (H) 65 - 117 mg/dL    Performed by Bessy Murphy, POC    Collection Time: 23  9:10 PM   Result Value Ref Range    Glucose (POC) 125 (H) 65 - 117 mg/dL    Performed by Sanjuanita Dick, POC    Collection Time: 03/15/23 12:10 AM   Result Value Ref Range    Glucose (POC) 103 65 - 117 mg/dL    Performed by Claudio Buck    NT-PRO BNP    Collection Time: 03/15/23  3:04 AM   Result Value Ref Range    NT pro-BNP 20,308 (H) <125 PG/ML   MAGNESIUM    Collection Time: 03/15/23  3:04 AM   Result Value Ref Range    Magnesium 3.0 (H) 1.6 - 2.4 mg/dL   LD    Collection Time: 03/15/23  3:04 AM   Result Value Ref Range     (H) 85 - 241 U/L   PROTHROMBIN TIME + INR    Collection Time: 03/15/23  3:04 AM   Result Value Ref Range    INR 1.6 (H) 0.9 - 1.1      Prothrombin time 16.2 (H) 9.0 - 11.1 sec   PHOSPHORUS    Collection Time: 03/15/23  3:04 AM   Result Value Ref Range    Phosphorus 6.3 (H) 2.6 - 4.7 MG/DL   METABOLIC PANEL, COMPREHENSIVE    Collection Time: 03/15/23  3:04 AM   Result Value Ref Range    Sodium 136 136 - 145 mmol/L    Potassium 3.9 3.5 - 5.1 mmol/L    Chloride 103 97 - 108 mmol/L    CO2 16 (L) 21 - 32 mmol/L    Anion gap 17 (H) 5 - 15 mmol/L    Glucose 102 (H) 65 - 100 mg/dL    BUN 87 (H) 6 - 20 MG/DL    Creatinine 4.53 (H) 0.70 - 1.30 MG/DL    BUN/Creatinine ratio 19 12 - 20      eGFR 13 (L) >60 ml/min/1.73m2    Calcium 11.8 (H) 8.5 - 10.1 MG/DL    Bilirubin, total 22.6 (H) 0.2 - 1.0 MG/DL    ALT (SGPT) 165 (H) 12 - 78 U/L    AST (SGOT) 316 (H) 15 - 37 U/L    Alk. phosphatase 330 (H) 45 - 117 U/L    Protein, total 6.6 6.4 - 8.2 g/dL    Albumin 3.6 3.5 - 5.0 g/dL    Globulin 3.0 2.0 - 4.0 g/dL    A-G Ratio 1.2 1.1 - 2.2     LACTIC ACID    Collection Time: 03/15/23  3:04 AM   Result Value Ref Range    Lactic acid 1.6 0.4 - 2.0 MMOL/L   PROCALCITONIN    Collection Time: 03/15/23  3:04 AM   Result Value Ref Range    Procalcitonin 5.57 ng/mL   CBC WITH AUTOMATED DIFF    Collection Time: 03/15/23  3:04 AM   Result Value Ref Range    WBC 7.6 4.1 - 11.1 K/uL    RBC 2.61 (L) 4.10 - 5.70 M/uL    HGB 8.3 (L) 12.1 - 17.0 g/dL    HCT 26.2 (L) 36.6 - 50.3 %    .4 (H) 80.0 - 99.0 FL    MCH 31.8 26.0 - 34.0 PG    MCHC 31.7 30.0 - 36.5 g/dL    RDW 25.2 (H) 11.5 - 14.5 %    PLATELET 951 186 - 851 K/uL    MPV 12.2 8.9 - 12.9 FL    NRBC 0.3 (H) 0  WBC    ABSOLUTE NRBC 0.02 (H) 0.00 - 0.01 K/uL    NEUTROPHILS 68 32 - 75 %    LYMPHOCYTES 10 (L) 12 - 49 %    MONOCYTES 13 5 - 13 %    EOSINOPHILS 7 0 - 7 %    BASOPHILS 1 0 - 1 %    IMMATURE GRANULOCYTES 1 (H) 0.0 - 0.5 %    ABS. NEUTROPHILS 5.1 1.8 - 8.0 K/UL    ABS. LYMPHOCYTES 0.8 0.8 - 3.5 K/UL    ABS. MONOCYTES 1.0 0.0 - 1.0 K/UL    ABS.  EOSINOPHILS 0.5 (H) 0.0 - 0.4 K/UL    ABS. BASOPHILS 0.1 0.0 - 0.1 K/UL    ABS. IMM. GRANS. 0.1 (H) 0.00 - 0.04 K/UL    DF SMEAR SCANNED      RBC COMMENTS MACROCYTOSIS  1+        RBC COMMENTS ANISOCYTOSIS  3+        RBC COMMENTS TARGET CELLS  1+       TYPE & SCREEN    Collection Time: 03/15/23  3:04 AM   Result Value Ref Range    Crossmatch Expiration 03/18/2023,2359     ABO/Rh(D) B POSITIVE     Antibody screen POS     Antibody ID NO ADDITIONAL ANTIBODIES DETECTED     RASHAD Poly POS     RASHAD IgG NEG     RASHAD C3b/C3d NEG     Unit number [id number]     Blood component type  LR     Unit division 00     Status of unit ALLOCATED     Crossmatch result Compatible     Unit number [id number]     Blood component type  LR     Unit division 00     Status of unit ALLOCATED     Crossmatch result Compatible    PTH INTACT    Collection Time: 03/15/23  3:06 AM   Result Value Ref Range    Calcium 11.7 (H) 8.5 - 10.1 MG/DL    PTH, Intact 84.5 18.4 - 88.0 pg/mL   GLUCOSE, POC    Collection Time: 03/15/23  6:31 AM   Result Value Ref Range    Glucose (POC) 140 (H) 65 - 117 mg/dL    Performed by [address] [place] [address], POC    Collection Time: 03/15/23 11:30 AM   Result Value Ref Range    Glucose (POC) 136 (H) 65 - 117 mg/dL    Performed by Singh Denise / Plan :     Mr. Vernell Palomo has had a lengthy hospitalization for HFrEF with LVAD placement 2/15,  Impella placed 2/18 and subsequently removed 3/1. His stay has been complicated by ongoing need for dialysis, inability to wean from ventilator and SAH. GI re-consulted to evaluate hyperbilirubinemia and acute pancreatitis evidenced by lipase > 3000. Multi system organ failure with new onset pancreatitis likely related to hypoprofusion.      We will hold tube feeds overnight for abdominal US in AM. After discussion with nephrology consider CT AP scan with contrast- will consult with HF team.            Patient Active Hospital Problem List:   Active Problems:    [facility] (12/12/2022)        Time Spent with Patient: [address], NP        I have personally reviewed the history and independently examined the patient. I have reviewed the chart and agree with the documentation recorded by the Mid Level Provider, including the assessment, treatment plan, and disposition. ASSESSMENT AND PLAN:  We were asked to evaluate for jaundice/pancreatitis. He appears to be progressively declining and jaundice is likely secondary to congestive hepatopathy from chronic CHF. Pancreatitis is likely secondary to hypoperfusion from poor cardiac output although other causes such as medications and gall stone pancreatitis can not be excluded. There are rare case reports of Keppra causing pancreatitis. Abdominal US  Palliative care consult to establish realistic goals. Consider  CT abdomen with contrast if family decides against conservative course.     Alphonso Pelayo MD

## 2023-03-15 NOTE — PROGRESS NOTES
Problem: Self Care Deficits Care Plan (Adult)  Goal: *Acute Goals and Plan of Care (Insert Text)  Description:   FUNCTIONAL STATUS PRIOR TO ADMISSION: Patient required minimal assistance for basic and instrumental ADLs. Used rollator for functional mobility, had HHA and HH OT/PT upon discharge. HOME SUPPORT: The patient lived with wife and family members. Occupational Therapy Goals  Initiated 1/30/2023; Goals remain the same, LVAD scheduled for 2/15  1. Patient will perform grooming with supervision/set-up within 7 day(s). 2.  Patient will perform upper body dressing with supervision/set-up within 7 day(s). 3.  Patient will perform lower body dressing with supervision/set-up within 7 day(s). 4.  Patient will perform all aspects of toileting with supervision/set-up within 7 day(s). 5.  Patient will utilize energy conservation techniques during functional activities with verbal cues within 7 day(s). Occupational Therapy Goals  Initiated 3/13/2023  1. Patient will perform ADLs standing 5 mins without fatigue or LOB with maximal assistance  within 7 day(s). 2.  Patient will perform lower body ADLs with maximal assistance  within 7 day(s). 3.  Patient will perform upper body ADLs with maximal assistance within 7 day(s). 4.  Patient will perform toilet transfers with maximal assistance within 7 day(s). 5.  Patient will perform all aspects of toileting with maximal assistance within 7 day(s). 6.  Patient will participate in cardiac/sternal upper extremity therapeutic exercise/activities to increase independence with ADLs with maximal assistance for 5 minutes within 7 day(s). 7.  Patient will perform VAD switchover routine as part of ADL routine (including batteries<>batteries, battery clip<>battery clip, mock SC<>SC) with maximal assistance within 7 days. 8. Patient will participate in [address] [employer] and/or [address] when appropriate within 7 days.          Outcome: Progressing Towards Goal   OCCUPATIONAL THERAPY TREATMENT  Patient: Jem Dillard (76 y.o. male)  Date: 3/15/2023  Diagnosis: CHF (congestive heart failure) ([facility]) [I50.9] <principal problem not specified>  Procedure(s) (LRB):  LEFT GROIN RP IMPELLA INSERTION, KIARRA BY DR Johanna Roblero (Left) 25 Days Post-Op  Precautions: Fall (mindful movement)  Chart, occupational therapy assessment, plan of care, and goals were reviewed. ASSESSMENT  Patient continues with skilled OT services and is not progressing towards goals. Patient remains lethargic, with no command following observed throughout session. Patient unable to open eyes to multimodal cueing, only opening to painful stimuli (BLE stretching by PT). Continues to req vent support, per chart HD terminated this AM and CRRT to be completed later on this date. Patient with demonstrating increased clonus in RUE/RLE compared to prior sessions, LUE/LLE with decreased tone and not active movement observed. Patient tolerated brief ROM of BUEs and cervical region stretching, when attempting to facilitate close vs open fist of RUE patient noted to wince in pain however eyes remaining closed. Repositioned at conclusion of session, BUEs elevated with pillow support. RN notified of session. Will continue 5x/week freq trial, however if patient remains lethargic & unable to follow commands will have to decreased freq. Current Level of Function Impacting Discharge (ADLs): total A     Other factors to consider for discharge: LVAD, trach         PLAN :  Patient continues to benefit from skilled intervention to address the above impairments. Continue treatment per established plan of care to address goals.       Recommendation for discharge: (in order for the patient to meet his/her long term goals)  To be determined: will likely require rehab    This discharge recommendation:  Has been made in collaboration with the attending provider and/or case management    IF patient discharges home will need the following DME: TBD       SUBJECTIVE:   Patient did not verbalize during session    OBJECTIVE DATA SUMMARY:   Cognitive/Behavioral Status:  Neurologic State: Eyes do not open to any stimulus  Orientation Level: Unable to verbalize  Cognition: Decreased command following          Functional Mobility and Transfers for ADLs:  Patient remains lethargic/drowsy and with minimal command following, requires up to total A for ADLs at this time. Therapeutic Exercises:   Engaged in BUE ROM, noted L hand edema. Repositioned BUEs on pillows to facilitate elevation. Completed brief cervical stretching to isacc regions, patient with increased tightness noted in SCM. Pain:  Pt with response to pain, opening eyes briefly, reaching RUE anteriorly. Activity Tolerance:   Poor and lethargic    After treatment patient left in no apparent distress:   Supine in bed, Call bell within reach, and Side rails x 3    COMMUNICATION/COLLABORATION:   The patients plan of care was discussed with: Physical therapist, Occupational therapist, and Registered nurse.      Naty Dumont, YAYA  Time Calculation: 23 mins

## 2023-03-15 NOTE — PROGRESS NOTES
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 Terence Harrington  C/b RV failure requiring Impella RP placed 23 and discontinued 3/1/23  C/b acute on chronic renal failure, requiring CRRT  C/b hepatic failure, TB 12 (peak 15.3)  C/b lactic acidosis, improved  C/b persistent septic shock c/b vasodilation and high outputs, on multiple antibiotics, procalcitonin persistently elevated  C/b R SUPA occlusion with small area of matched perfusion defect - subacute infarct c/b left sided hemiparesis  C/b pancreatitis, persistent  C/b failure to extubate x 2 s/p tracheostomy  Patient was approved for LVAD implantation as destination therapy at [facility] 2/3/23; the following have been identified and d/w patient as relative concerns pertaining to LVAD candidacy: small body surface area, cachexia, BMI 18, malnutrition, frailty, advanced age, muscular deconditioning, PVD (fingertips), urinary retention requiring donnelly catheter, neuroendocrine tumor class 1 requiring treatment after LVAD, mild neurocognitive dysfunction, chronic kidney disease and hearing loss requiring hearing aid  Family meeting yesterday lead by Dr. Terence Harrington, Dr. Scottie Hanna, Dr. Landen Schwartz and Dr. Larissa Silva  Patient remains critically ill, improved RV and respiratory status; persistent liver failure and pancreatitis complicating picture; we are still hopeful for recovery over the next two week, but would like to meet with family to prepare for \"what ifs\"  Family meeting scheduled on Friday at 1:45pm with AHF, intensivist, palliative care        RECOMMENDATIONS:  Continue LVAD speed at 4700rpm  Use hydralazine 5mg IV q4h prn MAP>85 or SBP > 110 mmHg  No beta-blockers due to hypotension and RV conditioning prior to LVAD  Cannot tolerate ARB/ARNi due to hypotension and upcoming surgical procedure   Cannot tolerate spironolactone due to hyperkalemia  Cannot tolerate jardiance due to significant diuresis on smallest dose/contributed to IVVD  Previously discontinued corlanor due to SVT  Digoxin stopped d/t risk for toxicity with amio loading  Discontinued amio due to intermitted heart blocks under pacemaker  HD per nephrology switch to CRRT to keep consistently goal CVP 8-10  Check echocardiogram  Keep K+ >4 and Mg >2  ID recommendations appreciated - pan culture NGTD  No colchicine per nephrology  Rising sepsis markers, procalcitonin, sed rate and fevers, abx on hold per intensivist  Pancreatitis per intensivist, GI consult?   Hold ASA d/t CVA   Continue to hold coumadin  Heparin gtt on hold for now due to slightly progressive on SAH on head CT  Hepatology recommendations appreciated   Cannot tolerate statins due to abnormal LFT  Management of tube feeds in light of pancreatitis per intensivist  Consider GI consultation  Continue bowel regimen   Daily dressing changes to Drive line exit site  Pulmonary hygiene- continue SBT   VAD education with caregivers/patient when able by VAD coordinator  D/w  bedside staff      INTERVAL HISTORY:  Low grade temp  MAPs 70-80s, HR 90-100s  CVP 12  I/O net positive 451cc, dialysis 1 liters  Hgb down to 8.3  INR 1.6  Ca down to 10.6  Cr 3.1  TB 22.6 from 20.7 from 20.6 from 19.4  LFTs unchanged  Lipase > 3000  Lactic 1.9  ESR 67 from 56  Procalcitonin 5.2 from 4.9  Lactic 1.6 from 1.9       IMPRESSION:  Acute on chronic combined systolic/diastolic  heart failure  Stage D, NYHA class IV symptoms   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, LVEF 20% (by echo 1/2023) and 23% (by cMRI 1/3/23)  Cardiac MRI suggestive of ischemic cardiomyopathy  PYP equivocal  S/p HeartMate 3  LVAD-DT (2/15/23)  C/b RV failure requiring Impella RP placed 2/18/23 and discontinued 3/1/23  C/b acute on chronic renal failure, requiring CRRT  C/b hepatic failure, TB 12 (peak 15.3)  C/b lactic acidosis, persistent  C/b persistent septic shock c/b vasodilation and high outputs, on multiple antibiotics, procalcitonin persistently elevated  C/b R SUPA occlusion with small area of matched perfusion defect - subacute infarct c/b left sided hemiparesis  C/b pancreatitis  C/b failure to extubate x 2; anticipating tracheostomy this week  Coronary artery disease  CAD s/p CABG x 2: further disease best managed medically due to small vessel size   At risk of sudden cardiac death  Peripheral arterial disease  Bilateral hydronephrosis s/p andrzej  Cardiac risk factors:  HTN  HL  TRISTAN, STOP-BANG 4  DM2  CKD, stage 3  MOCA from 2/9 19/30, consistent with mild cognitive impairment  Hard of hearing  Gastric Neuroendocrine Tumor, elevated gastrin and chromogranin A  Septic shock, improving   Left sided weakness noted night 3/1. +SAH and subacute occlusion distal R cerebral artery         LIFE GOALS: not readdressed today   Patient's personal goals included: having a few more years with family  Important upcoming milestones or family events: None at this time   The patient identified the following as important for living well: being at home, not being SOB            CXR (3/5/23) mild pulmonary edema. Small pleural effusions and bibasilar airspace opacities. Head CTA (3/2/23) Occlusion distal right anterior cerebral artery, with associated small area of matched perfusion defect and cortical enhancement, consistent with subacute infarct. Diminutive and irregular right vertebral artery, occluded at the V3-4 junction, age indeterminate extensive multifocal atherosclerosis in the intracranial and extracranial circulation.         CARDIAC IMAGING:   Echo (3/13/23)    Left Ventricle: Severely reduced left ventricular systolic function with a visually estimated EF of 25 - 30%. Left ventricle is dilated. Normal wall thickness. Unable to assess wall motion. Right Ventricle: Moderately reduced systolic function. TAPSE is abnormal. TAPSE is 1.1 cm. LVEDD 5.3cm     ECHO- 3/6/23       Left Ventricle: Severely reduced left ventricular systolic function with a visually estimated EF of 15 - 20%. Left ventricle is moderately dilated. LVAD is present. Right Ventricle: Right ventricle is moderately dilated. Mildly reduced systolic function. Echo 2/19/23    Left Ventricle: Severely reduced left ventricular systolic function with a visually estimated EF of 20 - 25%. Not well visualized. Left ventricle size is normal. Increased wall thickness. Unable to assess wall motion. Abnormal diastolic function. LVAD is present. LVAD inflow cannula was not well visualized. Right Ventricle: Not well visualized. Right ventricle is mildly dilated. Moderately reduced systolic function. TAPSE is abnormal.    Mitral Valve: Severe annular calcification at the anterior and posterior leaflets of the mitral valve. Mild regurgitation. Left Atrium: Left atrium is dilated. Right Atrium: Right atrium is dilated. Technical qualifiers: Echo study was technically difficult and technically difficult with poor endocardial visualization. Echo 1/27/23    Left Ventricle: EF by visual approximation is 20%. Left ventricle is mildly dilated. Mitral Valve: Moderately thickened leaflet, at the anterior and posterior leaflets. Moderately calcified leaflet. Moderate regurgitation. Left Atrium: Left atrium is moderately dilated. Technical qualifiers: Echo study was technically difficult with poor endocardial visualization. Contrast used: Definity. Limited study, not all structures viewed     Echo 1/9/23   Left Ventricle: Severely reduced left ventricular systolic function with a visually estimated EF of 25 - 30%.  Left ventricle size is normal. Mildly increased wall thickness. Echo 12/26/22    Left Ventricle: Severely reduced left ventricular systolic function with a visually estimated EF of 25 - 30%. Left ventricle size is normal. Normal wall thickness. There are regional wall motion abnormalities. Grade II diastolic dysfunction with increased LAP. Right Ventricle: Moderately reduced systolic function. TAPSE is abnormal. TAPSE is 1.1 cm. Aortic Valve: Mild stenosis of the aortic valve. AV peak gradient is 13 mmHg. AV peak velocity is 1.8 m/s. Mitral Valve: Not well visualized. Moderate annular calcification at the posterior leaflet of the mitral valve. Mild to moderate regurgitation. Tricuspid Valve: Mildly elevated RVSP. Left Atrium: Left atrium is moderately dilated. 12/8/22    Left Ventricle: Moderately reduced left ventricular systolic function with a visually estimated EF of 35 - 40%. Severe hypokinesis of the following segments: mid anteroseptal, apical anterior, apical septal, apical inferior and apical lateral. Severe hypokinesis of the apex. Mitral Valve: Severely thickened leaflet, at the anterior and posterior leaflets. Severely calcified leaflet, at the anterior and posterior leaflets. Mild annular calcification of the mitral valve. Moderate regurgitation. Left Atrium: Left atrium is mildly dilated. Contrast used: Definity. limited study     EKG 12/22/22 ST, Biatria enlargement, marked ST abnormality     C 12/6/22  1. Normal LVEDP  2. Severe native multivessel coronary artery disease  3. Patent LIMA to LAD and vein graft to distal RCA  4. Recurrent ISR in OM1 stent with now 60 to 70% restenosis  5. Recoil of left main and circumflex stent with now recurrent 40 to 50% stenosis. 6.  Progression of ostial left main disease now to about 60% stenosis  7. Progression of disease in jailed first marginal branch now with diffuse 90% stenosis  8.   High-grade stenosis in the mid to distal right potential femoral artery treated with 6 x 40 mm impact drug-coated balloon angioplasty to reduce the stenosis to less than 40%        HEMODYNAMICS:  RHC 1/9/23  PA 20/9, RA 3, PCWP 8, CI 1.8     CPEST too ill   6MW 300 feet   Physical Assessment:   Physical Exam  Vitals and nursing note reviewed. Constitutional:       Appearance: He is ill-appearing, trach on vent   Cardiovascular:      Rate and Rhythm: Regular rhythm. Tachycardia present. Heart sounds: Normal heart sounds. No murmur heard. Comments: + VAD hum  Pulmonary:      Breath sounds: Rhonchi present. Comments: intubated  Abdominal:      General: There is distension. Palpations: Abdomen is soft. Musculoskeletal:         General: No swelling. Skin:     General: Skin is warm and dry. Coloration: Skin is jaundiced. Neurological:      Mental Status: He is alert.       Comments: sedated        REVIEW OF SYSTEMS:  Review of Systems   Unable to perform ROS: Acuity of condition    LVAD INTERROGATION:  Device interrogated in person  Device function normal, normal flow, no events  LVAD   Pump Speed (RPM): 4700  Pump Flow (LPM): 3.6  MAP: 80  PI (Pulsitility Index): 3.3  Power: 3   Test: Yes  Back Up  at Bedside & Labeled: Yes  Power Module Test: Yes  Driveline Site Care: No  Driveline Dressing: Clean, Dry, and Intact  Testing  Alarms Reviewed: Yes  Back up SC speed: 4700  Back up Low Speed Limit: 4300  Emergency Equipment with Patient?: Yes  Emergency procedures reviewed?: No  Drive line site inspected?: Yes  Drive line intergrity inspected?: Yes  Drive line dressing changed?: No    PHYSICAL EXAM:  Visit Vitals  BP (!) 110/58   Pulse (!) 103   Temp 99.8 °F (37.7 °C)   Resp 27   Ht 5' 6\" (1.676 m)   Wt 140 lb 10.5 oz (63.8 kg)   SpO2 95%   BMI 22.70 kg/m²     PAST MEDICAL HISTORY:  Past Medical History:   Diagnosis Date    CAD (coronary artery disease) 11/10/2016    NSTEMI & 2 stents    Deafness 10/28/2012 [facility]di[facility]     Elevated cholesterol     Hypertension     NSTEM[facility] (non-ST elevated myocardial infarction) [facility] 11/10/2016       PAST SURGICAL HISTORY:  Past Surgical History:   Procedure Laterality Date    COLONOSCOPY N/A 6/28/2018    COLONOSCOPY performed by Richard Amezquita MD at [facility] ENDOSCOPY    COLONOSCOPY N/A 1/18/2023    COLONOSCOPY performed by Marcy Reilly MD at [facility] ENDOSCOPY    [address]  11/11/2016    2 stents       FAMILY HISTORY:  Family History   Problem Relation Age of Onset    Heart Disease Father     Heart Attack Father     Hypertension Mother     Elevated Lipids Brother     Elevated Lipids Brother     No Known Problems Sister     Elevated Lipids Brother     No Known Problems Son     No Known Problems Daughter     Anesth Problems Neg Hx        SOCIAL HISTORY:  Social History     Socioeconomic History    Marital status:    Tobacco Use    Smoking status: Never     Passive exposure: Never    Smokeless tobacco: Never   Vaping Use    Vaping Use: Never used   Substance and Sexual Activity    Alcohol use: Yes     Alcohol/week: 2.0 standard drinks     Types: 1 Cans of beer, 1 Shots of liquor per week     Comment: rarely    Drug use: No    Sexual activity: Yes     Social Determinants of Health     Financial Resource Strain: Medium Risk    Difficulty of Paying Living Expenses: Somewhat hard   Food Insecurity: Food Insecurity Present    Worried About Running Out of Food in the Last Year: Never true    Ran Out of Food in the Last Year: Often true       LABORATORY RESULTS:     Labs Latest Ref Rng & Units 3/15/2023 3/15/2023 3/14/2023 3/13/2023 3/12/2023 3/11/2023 3/11/2023   WBC 4.1 - 11.1 K/uL - 7.6 8.0 8.1 7.1 - 8.1   RBC 4.10 - 5.70 M/uL - 2.61(L) 2.60(L) 2.64(L) 2.50(L) - 2.26(L)   Hemoglobin 12.1 - 17.0 g/dL - 8. 3(L) 8. 3(L) 8.4(L) 8. 1(L) 9.0(L) 7. 2(L)   Hematocrit 36.6 - 50.3 % - 26. 2(L) 25. 7(L) 26. 4(L) 25. 2(L) 28. 4(L) 23. 4(L)   MCV 80.0 - 99.0 FL - 100. 4(H) 98.8 100. 0(H) 100. 8(H) - 103. 5(H)   Platelets 594 - 891 K/uL - 271 233 260 239 - 261   Lymphocytes 12 - 49 % - 10(L) 9(L) 8(L) 8(L) - 7(L)   Monocytes 5 - 13 % - 13 14(H) 14(H) 13 - 12   Eosinophils 0 - 7 % - 7 8(H) 3 3 - 2   Basophils 0 - 1 % - 1 1 0 0 - 0   Albumin 3.5 - 5.0 g/dL - 3.6 3.5 3.5 4.0 - 3.9   Calcium 8.5 - 10.1 MG/DL 11. 7(H) 11. 8(H) 11. 9(H) 12. 1(H) 10. 6(H) - 11. 6(H)   Glucose 65 - 100 mg/dL - 102(H) 197(H) 205(H) 200(H) - 215(H)   BUN 6 - 20 MG/DL - 87(H) 62(H) 73(H) 52(H) - 69(H)   Creatinine 0.70 - 1.30 MG/DL - 4.53(H) 3.36(H) 4.28(H) 3.17(H) - 2.51(H)   Sodium 136 - 145 mmol/L - 136 137 138 138 - 139   Potassium 3.5 - 5.1 mmol/L - 3.9 3.5 3.9 3.8 - 3.8   TSH 0.36 - 3.74 uIU/mL - - - - - - -   PSA 0.01 - 4.0 ng/mL - - - - - - -   LDH 85 - 241 U/L - 282(H) 259(H) 256(H) 272(H) - 291(H)   Some recent data might be hidden     Lab Results   Component Value Date/Time    TSH 3.52 01/28/2023 05:26 AM    TSH 2.12 12/27/2022 02:36 PM    TSH 4.80 (H) 12/06/2022 03:53 AM    TSH 5.39 (H) 10/12/2022 09:10 AM    TSH 3.53 02/03/2022 11:47 AM    TSH 5.790 (H) 11/21/2019 04:45 PM    TSH 3.08 06/22/2018 01:53 PM    TSH 4.250 05/26/2015 09:43 AM       ALLERGY:  Allergies   Allergen Reactions    Ambien [Zolpidem] Other (comments)     Causes extreme confusion        CURRENT MEDICATIONS:    Current Facility-Administered Medications:     bicarbonate dialysis (PRISMASOL) BG K 4/Ca 2.5 5000 ml solution, , Extracorporeal, DIALYSIS CONTINUOUS, Jadiel Galvan MD, Last Rate: 1,600 mL/hr at 03/15/23 1219, New Bag at 03/15/23 1219    levETIRAcetam (KEPPRA) injection 500 mg, 500 mg, IntraVENous, Q12H, Silvio Trevino MD    cinacalcet (SENSIPAR) tablet 60 mg, 60 mg, Oral, BID WITH MEALS, Jadiel Galvan MD, 60 mg at 03/15/23 0855    insulin NPH (NOVOLIN N, HUMULIN N) injection 10 Units, 10 Units, SubCUTAneous, Q12H, Cathy Sheldon CNS, 10 Units at 03/15/23 0855    albumin human 25% (BUMINATE) solution 12.5 g, 12.5 g, IntraVENous, Q12H, Lizette Linton NP, 12.5 g at 03/15/23 0854    oxyCODONE IR (ROXICODONE) tablet 5 mg, 5 mg, Oral, Q4H PRN, Marvetta Iba, DO, 5 mg at 03/15/23 4501    oxyCODONE IR (ROXICODONE) tablet 10 mg, 10 mg, Oral, Q4H PRN, Marvetta Iba, DO    pantoprazole (PROTONIX) 40 mg in 0.9% sodium chloride 10 mL injection, 40 mg, IntraVENous, DAILY, Walker Campos MD, 40 mg at 03/15/23 1048    labetaloL (NORMODYNE;TRANDATE) injection 5 mg, 5 mg, IntraVENous, Q4H PRN, Floyd MOLINA MD, 5 mg at 03/09/23 0307    [Held by provider] heparin 25,000 units in D5W 250 ml infusion, 400 Units/hr, IntraVENous, CONTINUOUS, Lizette Linton NP, Stopped at 03/10/23 1350    0.9% sodium chloride infusion, 14 mL/hr, IntraVENous, CONTINUOUS, Floyd MOLINA MD, Last Rate: 14 mL/hr at 03/09/23 0757, 14 mL/hr at 03/09/23 0757    albumin human 25% (BUMINATE) solution 25 g, 25 g, IntraVENous, DIALYSIS PRN, Tabitha Chery MD, 25 g at 03/13/23 0539    insulin lispro (HUMALOG) injection, , SubCUTAneous, Q6H, Floyd MOLINA MD, 1 Units at 03/14/23 0040    hydrALAZINE (APRESOLINE) 20 mg/mL injection 5 mg, 5 mg, IntraVENous, Q6H PRN, Malinda Mchugh MD, 5 mg at 03/10/23 1316    hydrALAZINE (APRESOLINE) tablet 5 mg, 5 mg, Oral, PRN, Malinda Mchugh MD    aspirin chewable tablet 81 mg, 81 mg, Per NG tube, DAILY, Ariel Hunter MD, 81 mg at 03/15/23 1048    epoetin heidy-epbx (RETACRIT) injection 10,000 Units, 10,000 Units, SubCUTAneous, Q TUE, THU & SAT, Abou-Assi, Julius Hall MD, 10,000 Units at 03/14/23 2245    EPINEPHrine (ADRENALIN) 10 mg in 0.9% sodium chloride 250 mL infusion, 0-10 mcg/min, IntraVENous, TITRATE, Karol Johnson MD, Stopped at 03/05/23 0920    dextrose (D50W) injection syrg 25 g, 25 g, IntraVENous, PRN, Jenna Cope MD, 9 mL at 02/25/23 1225    NOREPINephrine (LEVOPHED) 8 mg in 5% dextrose 250mL (32 mcg/mL) infusion, 0.5-16 mcg/min, IntraVENous, TITRATE, Pablo Junior MD, Last Rate: 5.6 mL/hr at 03/15/23 0826, 3 mcg/min at 03/15/23 0826    [Held by provider] colchicine tablet 0.6 mg, 0.6 mg, Oral, DAILY, Shaila, Lizette B, NP, 0.6 mg at 03/14/23 0848    heparin (porcine) 1,000 unit/mL injection 1,700 Units, 1,700 Units, InterCATHeter, DIALYSIS PRN, 1,700 Units at 03/13/23 0819 **AND** heparin (porcine) 1,000 unit/mL injection 1,500 Units, 1,500 Units, InterCATHeter, DIALYSIS PRN, Tarun Rodriguez DO, 1,500 Units at 03/13/23 0819    balsam peru-castor oiL (VENELEX) ointment, , Topical, BID, Tasha Mensah MD, Given at 03/15/23 0856    [Held by provider] acetaminophen (TYLENOL) solution 650 mg, 650 mg, Oral, Q4H PRN, Pablo Junior MD, 650 mg at 03/14/23 1924    [Held by provider] acetaminophen (TYLENOL) suppository 650 mg, 650 mg, Rectal, Q4H PRN, Pablo Junior MD, 650 mg at 02/17/23 2013    HYDROmorphone (DILAUDID) injection 0.5 mg, 0.5 mg, IntraVENous, Q3H PRN, Luz Maria MOLINA MD, 0.5 mg at 03/15/23 0316    HYDROmorphone (DILAUDID) injection 1 mg, 1 mg, IntraVENous, Q4H PRN, Luz Maria MOLINA MD, 1 mg at 03/14/23 0848    [Held by provider] heparin 25,000 units in D5W 250 ml infusion, 12-25 Units/kg/hr, IntraVENous, TITRATE, Parker Motley MD, Stopped at 03/08/23 0515    glucose chewable tablet 16 g, 4 Tablet, Oral, PRN, Shaila, Lizette B, NP    glucagon (GLUCAGEN) injection 1 mg, 1 mg, IntraMUSCular, PRN, Shaila, Lizette B, NP    sodium chloride (NS) flush 5-40 mL, 5-40 mL, IntraVENous, Q8H, Shaila, Lizette B, NP, 10 mL at 03/15/23 0631    sodium chloride (NS) flush 5-40 mL, 5-40 mL, IntraVENous, PRN, Shaila, Lizette B, NP, 40 mL at 02/17/23 1818    naloxone (NARCAN) injection 0.4 mg, 0.4 mg, IntraVENous, PRN, Shaila, Lizette B, NP    ELECTROLYTE REPLACEMENT NOTE: Nurse to review Serum Potassium and Magnesuim levels and Initiate Electrolyte Replacement Protocol as needed, 1 Each, Other, PRN, Shaila, Lizette B, NP    dextrose 10 % infusion 0-250 mL, 0-250 mL, IntraVENous, PRN, Shaila, Lizette B, NP, Last Rate: 150 mL/hr at 02/24/23 0220, 75 mL at 02/24/23 0220    [Held by provider] acetaminophen (TYLENOL) tablet 650 mg, 650 mg, Oral, Q6H PRN, Shaila, Lizette B, NP, 650 mg at 03/13/23 2114    ondansetron (ZOFRAN) injection 4 mg, 4 mg, IntraVENous, Q4H PRN, Shaila, Lizette B, NP, 4 mg at 03/09/23 1212    albuterol-ipratropium (DUO-NEB) 2.5 MG-0.5 MG/3 ML, 3 mL, Nebulization, Q6H PRN, Shaila, Lizette B, NP    bisacodyL (DULCOLAX) suppository 10 mg, 10 mg, Rectal, DAILY PRN, Shaila, Lizette B, NP, 10 mg at 02/22/23 0839    0.45% sodium chloride infusion, 10 mL/hr, IntraVENous, CONTINUOUS, Juan Luis Falk MD, Stopped at 03/10/23 1707    dexmedeTOMidine in 0.9 % NaCl (PRECEDEX) 400 mcg/100 mL (4 mcg/mL) infusion soln, 0.1-1.5 mcg/kg/hr, IntraVENous, TITRATE, [facility] Providence[facility]her terrell MD, Stopped at 03/08/23 2300    PATIENT CARE TEAM:  Patient Care Team:  Loc Isaacs MD as PCP - General (Family Medicine)  Loc Isaacs MD as PCP - [facility]  Elmer Amador MD (Cardiovascular Disease Physician)  Marlen Garcia MD (Gastroenterology)  Cuco Linares MD (Cardiothoracic Surgery)  Link Ferreira MD (Cardiovascular Disease Physician)  Duran Delong MD (Nephrology)  Kathryn Reeves MD (Pulmonary Disease)  Juan Luis Falk MD ([address] [place] [address] Vascular Surgery)     Thank you for allowing me to participate in this patient's care.     Sofia Robbins MD   [address] [place] [address], [address]  Phone: [phone]    Critically ill  Critical care 40 min

## 2023-03-15 NOTE — PROGRESS NOTES
Occupational Therapy  3/15/2023    Chart reviewed and discussed with RN. Patient currently on HD this AM. Will hold at this time and reattempt as able/appropriate. Thank you.      Naty Dumont, OTEDILBERTO, OTR/L

## 2023-03-15 NOTE — PROGRESS NOTES
CRITICAL CARE NOTE      Name: Lorenzo Foreman   : 1950   MRN: [id number]   Date: 3/15/2023      Reason for ICU Admission: Acute on chronic HFrEF     ICU PROBLEM LIST   Acute on chronic HFrEF (20%) NYHA IIIb/IV (D) on home inotropic support, life vest s/p LVAD  TANNER on CKD3, on CRRT  CHB post op, resolved  Aflutter w/ RVR  Acute on chronic hypoxemic respiratory failure, s/p trach placement  CAP  Gastric neuroendocrine tumor  Hx of LUE DVT  DM  PAD  TRISTAN  BPH w/ urinary retention requiring chronic donnelly    HISTORY OF PRESENT ILLNESS:   In brief, A 71year old male PMH as noted above admitted to [facility] on  due to ongoing symptoms secondary to his HFrEF. Treated for possible CAP, and diuresed for acute on chronic HFrEF. Nephrology following for TANNER on CKD 3. AHF team recommended transfer to CVICU for [facility] placement and ongoing LVAD workup, with placement 2/15. Pt extubated , however with development of worsening renal dysfunction overnight and unable to tolerate CRRT so was reintubated and taken for Impella RP placement for right-sided support in a.m. of  and CRRT resumed. Impella removed on 3/1. Overnight 3/1-3/1 CVA noted with SAH.  3/2 SAH enlarged. Held anticoagulation. Failed extubation 3/2. Trach placed 3/7. Transitioned to [facility], [place]. Repeat CTH 3/10 w/ persistent SAH, heparin held. 24 HOUR EVENTS:   No acute events overnight    ASSESSMENT AND PLANS:   In brief, A 71year old male PMH as noted above admitted to [facility] on  due to ongoing symptoms secondary to his HFrEF with SAH, s/p trach, dialysis.       NEUROLOGICAL: Acute encephalopathy, critical illness polyneuropathy   -Continue Keppra  -Delirium precautions      PULMONOLOGY: Acute proximal respiratory failure, status post tracheostomy 2023   -Continue to monitor oxygen saturations  -Continue mechanical ventilation  -Continue wean ventilator as tolerated  -Pulmonary hygiene      CARDIOVASCULAR: Ischemic and nonischemic cardiomyopathy, left ventricular ejection fraction 25 to 30%, status post LVAD 15 February 2023, status post Impella right side removed 1 March 2023, cardiorenal syndrome   -Continue monitor hemodynamic status  -Continue aspirin  -Continue vasopressors  -Wean as tolerated      GASTROINTESTINAL: Hepatic congestion, hyperbilirubinemia, possible cirrhosis, pancreatitis   -Continue enteral feeds  -Continue GI prophylaxis  -Abdominal ultrasound reviewed  -Elevated lipase  -Abdominal CT to evaluate pancreas  -Gastrointestinal consultation for pancreatitis and to reassess neuroendocrine tumor    RENAL/ELECTROLYTE/FLUIDS: Acute on chronic renal failure   -Continue hemodialysis with continuous renal replacement therapy  -Strict I's and O's  -Place electrolytes as indicated  -Nephrology following    ENDOCRINE:   -Continue to monitor blood glucose levels   -Maintain blood glucose levels between 140-180   -Sliding scale insulin   HEMATOLOGY/ONCOLOGY: Acute on chronic anemia, gastric neuroendocrine tumor   -CBC reviewed  -Maintain hemoglobin greater than 7  -EPO q. weekly    ID/MICRO:   -WBC 7.6  -No cultures  March 2023  ICU DAILY CHECKLIST     Code Status:Full  DVT Prophylaxis: SCDs  SUP: PPI  Diet: TF  Activity Level:ad jose f  ABCDEF Bundle/Checklist Completed:Yes  Disposition: Stay in ICU  Multidisciplinary Rounds Completed:  yes  Patient/Family Updated: Yes    I had a family meeting today, Ms Kendall at bedside and two children on the phone. I updated them about his condition. I explained that he remains vent dependent, dialysis dependent. He remains on pressor without evidence of infectious process. His mental status continues to deteriorate. Based on the discussion, my understanding is that patient would not want to continue on life-support and in fact, he was a DNR but it was resented due to LVAD procedure.  The family would like to have a discussion with the nephrologist and advanced cardiac teams before proceeding with comfort care. I completely support their decision as the chance of meaningful recovery is slim to none. The RN was notified regarding the family wish and request.     Tatiana   2/3 Transferred to CVICU for [facility] placement   started on dobutamine as adjunct to milrinone  2/15 LVAD implant   extubated   Impella RP placement  3/1 Impella removed  3/1- SAH on CT  3/3 RE-intubated  3/6 Blakes removed, transitioned to HD  3/7 Trach placment  3/10 persistent SAH, heparin stopped  3/11 HD catheter replaced    SUBJECTIVE:     As noted above    Review of Systems:     Unable to perform due to patient trached and mental status    OBJECTIVE:     Labs and Data: Reviewed 03/15/23  Medications: Reviewed 03/15/23  Imaging: Reviewed 03/15/23    General - unresponsive, chronically ill appearing  HEENT- pupils equal, no nystagmus, jaundiced sclerea  Neuro - unresponsive  CV - Paced,  post sternotomy  Resp - trached, good airflow bilateral without wheezing or rales. Decreased at bases  Abd - soft, not distended, nontender  MSK- intact, no sacral ulcer, anasarca  SKIN: juandiced    Visit Vitals  BP (!) 110/58   Pulse (!) 102   Temp 99.2 °F (37.3 °C)   Resp 30   Ht 5' 6\" (1.676 m)   Wt 63.8 kg (140 lb 10.5 oz)   SpO2 100%   BMI 22.70 kg/m²    O2 Flow Rate (L/min): 20 l/min O2 Device: Tracheostomy, Ventilator Temp (24hrs), Av.2 °F (37.3 °C), Min:98.3 °F (36.8 °C), Max:99.8 °F (37.7 °C)    CVP (mmHg): 16 mmHg (03/15/23 1600)      Intake/Output:     Intake/Output Summary (Last 24 hours) at 3/15/2023 1628  Last data filed at 3/15/2023 1600  Gross per 24 hour   Intake 1635.25 ml   Output 216.8 ml   Net 1418.45 ml       Imaging  All images and labs were reviewed by me personally. 23    ECHO ADULT FOLLOW-UP OR LIMITED 2023    Interpretation Summary    Left Ventricle: EF by visual approximation is 20%. Left ventricle is mildly dilated. Mitral Valve:  Moderately thickened leaflet, at the anterior and posterior leaflets. Moderately calcified leaflet. Moderate regurgitation. Left Atrium: Left atrium is moderately dilated. Technical qualifiers: Echo study was technically difficult with poor endocardial visualization. Contrast used: Definity. Limited study, not all structures viewed    Signed by: Jonas Rivers MD on 1/27/2023  4:39 PM       Pertinent imaging reviewed and interpreted as noted above    CRITICAL CARE DOCUMENTATION  I had a face to face encounter with the patient, reviewed and interpreted patient data including clinical events, labs, images, vital signs, I/O's, and examined patient. I have discussed the case and the plan and management of the patient's care with the consulting services, the bedside nurses and the respiratory therapist.      NOTE OF PERSONAL INVOLVEMENT IN CARE   This patient has a high probability of imminent, clinically significant deterioration, which requires the highest level of preparedness to intervene urgently. I participated in the decision-making and personally managed or directed the management of the following life and organ supporting interventions that required my frequent assessment to treat or prevent imminent deterioration. I personally spent 45 minutes of critical care time. This is time spent at this critically ill patient's bedside actively involved in patient care as well as the coordination of care. This does not include any procedural time which has been billed separately.     Arlene Webb MD  Critical Care Medicine  [facility]

## 2023-03-15 NOTE — DIALYSIS
Arrived to bedside for ordered hemodialysis treatment. HD machine primed and tested. Prior to initiation update received from Iva Reeves that patient will need to be placed on CRRT.   to update orders

## 2023-03-15 NOTE — PROGRESS NOTES
Problem: Mobility Impaired (Adult and Pediatric)  Goal: *Acute Goals and Plan of Care (Insert Text)  Description: FUNCTIONAL STATUS PRIOR TO ADMISSION: Patient was modified independent using a rollator for functional mobility. Pt recently d/c home after previous hospital stay. Able to ambulate community distances. HOME SUPPORT PRIOR TO ADMISSION: The patient lived with spouse but did not require assist.    Physical Therapy Goals  Initiated 3/12/2023  1. Patient will move from supine to sit and sit to supine , scoot up and down, and roll side to side in bed with maximal assistance within 7 days. 2.  Patient will sit EOB x3 minutes with max assist within 7 days. 3.  Patient will follow 50% of commands for participation in AROM while supine within 7 days. 4.  Patient will tolerate bed in chair position 30 min BID within 7 days. 5.  Family will be independent and compliant with in PROM for all extremities within 7 days. Updated 2/13/2023  1. Patient will move from supine to sit and sit to supine in bed with modified independence within 7 day(s). MET 2/13  2. Patient will transfer from bed to chair and chair to bed with modified independence using the least restrictive device within 7 day(s). MET 2/13  3. Patient will perform sit to stand with modified independence within 7 day(s). MET 2/13  4. Patient will ambulate with modified independence for 300 feet with the least restrictive device within 7 day(s). MET 2/13  5. Patient will ascend/descend 12 stairs with 1 handrail(s) with supervision/set-up within 7 day(s). 6.  Patient will verbally and functionally recall move in the tube techniques in prep for possible LVAD within 7 days. Physical Therapy Goals  Initiated 1/28/2023-- Goals appropriate and add #6 2/6/2023  1. Patient will move from supine to sit and sit to supine  in bed with modified independence within 7 day(s).     2.  Patient will transfer from bed to chair and chair to bed with modified independence using the least restrictive device within 7 day(s). 3.  Patient will perform sit to stand with modified independence within 7 day(s). 4.  Patient will ambulate with modified independence for 300 feet with the least restrictive device within 7 day(s). 5.  Patient will ascend/descend 12 stairs with 1 handrail(s) with supervision/set-up within 7 day(s). 6.  Patient will verbally and functionally recall move in the tube techniques in prep for possible LVAD within 7 days. Outcome: Progressing Towards Goal    PHYSICAL THERAPY TREATMENT  Patient: Rosibel Locke (76 y.o. male)  Date: 3/15/2023  Diagnosis: CHF (congestive heart failure) ([facility]) [I50.9] <principal problem not specified>  Procedure(s) (LRB):  LEFT GROIN RP IMPELLA INSERTION, KIARRA BY DR Chely Pina (Left) 25 Days Post-Op  Precautions: Fall (mindful movement)  Chart, physical therapy assessment, plan of care and goals were reviewed. ASSESSMENT  Patient continues with skilled PT services and is progressing towards goals. Pt is preparing to go on CRRT. Pt cleared for ROM. Pt with less eye opening compared to yesterday. Eyes open with pain. Noted right LE with tone and clonus. Minimal Movement observed with right UE and LE, but not on command. Current Level of Function Impacting Discharge (mobility/balance): total assist    Other factors to consider for discharge: medically complex          PLAN :  Patient continues to benefit from skilled intervention to address the above impairments. Continue treatment per established plan of care. to address goals.     Recommendation for discharge: (in order for the patient to meet his/her long term goals)  To be determined: depending on progression     This discharge recommendation:  Has not yet been discussed the attending provider and/or case management    IF patient discharges home will need the following DME: to be determined (TBD)       SUBJECTIVE:   Patient non verbal    OBJECTIVE DATA SUMMARY:   Critical Behavior:  Neurologic State: Eyes do not open to any stimulus  Orientation Level: Unable to verbalize  Cognition: Decreased command following  Safety/Judgement: Awareness of environment, Fall prevention, Insight into deficits  Functional Mobility Training:  Bed Mobility:         Therapeutic Exercises:     Bilateral LE. ROM- PROM on left LE. Right LE clonus noted with DF stretch and tone with knee flexion and hip abduction      Pain Rating:  Expressed with movement end range    Activity Tolerance:   Poor    After treatment patient left in no apparent distress:   Supine in bed, Heels elevated for pressure relief, and Call bell within reach    COMMUNICATION/COLLABORATION:   The patients plan of care was discussed with: Occupational therapist and Registered nurse.      [employer]ling [employer], PTA   Time Calculation: 25 mins

## 2023-03-16 NOTE — PROGRESS NOTES
03/16/23 1041   Weaning Parameters   Spontaneous Breathing Trial Complete No (Comments)  (tachypnea. low VT)   Resp Rate Observed 38   Ve 7.4      RSBI 159       SBT attempted on Pressure support of 10 PEEP 5. Patient placed back on previous vent settings at this time.

## 2023-03-16 NOTE — PROGRESS NOTES
CRITICAL CARE NOTE      Name: Tobias Campos   : 1950   MRN: [id number]   Date: 3/16/2023      Reason for ICU Admission: Acute on chronic HFrEF     ICU PROBLEM LIST   Acute on chronic HFrEF (20%) NYHA IIIb/IV (D) on home inotropic support, life vest s/p LVAD  TANNER on CKD3, on CRRT  CHB post op, resolved  Aflutter w/ RVR  Acute on chronic hypoxemic respiratory failure, s/p trach placement  CAP  Gastric neuroendocrine tumor  Hx of LUE DVT  DM  PAD  TRISTAN  BPH w/ urinary retention requiring chronic donnelly    HISTORY OF PRESENT ILLNESS:   In brief, A 71year old male PMH as noted above admitted to [facility] on  due to ongoing symptoms secondary to his HFrEF. Treated for possible CAP, and diuresed for acute on chronic HFrEF. Nephrology following for TANNER on CKD 3. AHF team recommended transfer to CVICU for [facility] placement and ongoing LVAD workup, with placement 2/15. Pt extubated , however with development of worsening renal dysfunction overnight and unable to tolerate CRRT so was reintubated and taken for Impella RP placement for right-sided support in a.m. of  and CRRT resumed. Impella removed on 3/1. Overnight 3/1-3/1 CVA noted with SAH.  3/2 SAH enlarged. Held anticoagulation. Failed extubation 3/2. Trach placed 3/7. Transitioned to [facility], [place], operated by [facility]. Repeat CTH 3/10 w/ persistent SAH, heparin held. 24 HOUR EVENTS:   No acute events overnight  CT scan today  ASSESSMENT AND PLANS:   In brief, A 71year old male PMH as noted above admitted to [facility] on  due to ongoing symptoms secondary to his HFrEF with SAH, s/p trach, dialysis.       NEUROLOGICAL: Acute encephalopathy, critical illness polyneuropathy   -Continue Keppra  -Delirium precautions      PULMONOLOGY: Acute proximal respiratory failure, status post tracheostomy 2023   -Continue to monitor oxygen saturations  -Continue mechanical ventilation  -Continue wean ventilator as tolerated  -Pulmonary hygiene      CARDIOVASCULAR: Ischemic and nonischemic cardiomyopathy, left ventricular ejection fraction 25 to 30%, status post LVAD 15 February 2023, status post Impella right side removed 1 March 2023, cardiorenal syndrome   -Continue monitor hemodynamic status  -Continue aspirin  -Continue vasopressors  -Wean as tolerated      GASTROINTESTINAL: Hepatic congestion, hyperbilirubinemia, possible cirrhosis, pancreatitis   -Continue enteral feeds  -Continue GI prophylaxis  -Abdominal ultrasound reviewed  -Elevated lipase  -Abdominal CT to evaluate pancreas today  -Repeat abdominal ultrasound completed  -Gastrointestinal consultation     -note and recommendations reviewed    RENAL/ELECTROLYTE/FLUIDS: Acute on chronic renal failure   -Continue hemodialysis with continuous renal replacement therapy  -Strict I's and O's  -Place electrolytes as indicated  -Nephrology following    -CRRT    ENDOCRINE:   -Continue to monitor blood glucose levels   -Maintain blood glucose levels between 140-180   -Sliding scale insulin   HEMATOLOGY/ONCOLOGY: Acute on chronic anemia, gastric neuroendocrine tumor   -Hbg 7.7  -Plt 234  ID/MICRO:   -WBC 7.6  -No cultures since 9 March 2023  ICU DAILY CHECKLIST   Code Status:Full  DVT Prophylaxis: SCDs  SUP: PPI  Diet: TF  Activity Level:ad jose f  ABCDEF Bundle/Checklist Completed:Yes  Disposition: Stay in ICU  Multidisciplinary Rounds Completed:  yes  Patient/Family Updated: Yes    I had a family meeting today, Ms Kendall at bedside and two children on the phone. I updated them about his condition. I explained that he remains vent dependent, dialysis dependent. He remains on pressor without evidence of infectious process. His mental status continues to deteriorate. Based on the discussion, my understanding is that patient would not want to continue on life-support and in fact, he was a DNR but it was resented due to LVAD procedure.  The family would like to have a discussion with the nephrologist and advanced cardiac teams before proceeding with comfort care. I completely support their decision as the chance of meaningful recovery is slim to none. The RN was notified regarding the family wish and request.     Ana Gaytan   2/3 Transferred to CVICU for [facility] placement   started on dobutamine as adjunct to milrinone  2/15 LVAD implant   extubated   Impella RP placement  3/1 Impella removed  3/1-2 SAH on CT  3/3 RE-intubated  3/6 Blakes removed, transitioned to HD  3/7 Trach placment  3/10 persistent SAH, heparin stopped  3/11 HD catheter replaced    SUBJECTIVE:     As noted above    Review of Systems:     Unable to perform due to patient trached and mental status    OBJECTIVE:     Labs and Data: Reviewed 23  Medications: Reviewed 23  Imaging: Reviewed 23    General - unresponsive, chronically ill appearing  HEENT- pupils equal, no nystagmus, jaundiced sclerea  Neuro - unresponsive  CV - Paced,  post sternotomy  Resp - trached, good airflow bilateral without wheezing or rales. Decreased at bases  Abd - soft, not distended, nontender  MSK- intact, no sacral ulcer, anasarca  SKIN: juandiced    Visit Vitals  /60   Pulse 90   Temp 98.4 °F (36.9 °C)   Resp 21   Ht 5' 6\" (1.676 m)   Wt 63.2 kg (139 lb 5.3 oz)   SpO2 100%   BMI 22.49 kg/m²    O2 Flow Rate (L/min): 20 l/min O2 Device: Tracheostomy, Ventilator Temp (24hrs), Av.8 °F (37.1 °C), Min:97.5 °F (36.4 °C), Max:99.5 °F (37.5 °C)    CVP (mmHg): 10 mmHg (23 1100)      Intake/Output:     Intake/Output Summary (Last 24 hours) at 3/16/2023 1125  Last data filed at 3/16/2023 1100  Gross per 24 hour   Intake 1357.17 ml   Output 1901.3 ml   Net -544.13 ml     Imaging  All images and labs were reviewed by me personally. 23    ECHO ADULT FOLLOW-UP OR LIMITED 2023 2023    Interpretation Summary    Left Ventricle: EF by visual approximation is 20%. Left ventricle is mildly dilated. Mitral Valve:  Moderately thickened leaflet, at the anterior and posterior leaflets. Moderately calcified leaflet. Moderate regurgitation. Left Atrium: Left atrium is moderately dilated. Technical qualifiers: Echo study was technically difficult with poor endocardial visualization. Contrast used: Definity. Limited study, not all structures viewed    Signed by: Trinidad Jiménez MD on 1/27/2023  4:39 PM       Pertinent imaging reviewed and interpreted as noted above    CRITICAL CARE DOCUMENTATION  I had a face to face encounter with the patient, reviewed and interpreted patient data including clinical events, labs, images, vital signs, I/O's, and examined patient. I have discussed the case and the plan and management of the patient's care with the consulting services, the bedside nurses and the respiratory therapist.      NOTE OF PERSONAL INVOLVEMENT IN CARE   This patient has a high probability of imminent, clinically significant deterioration, which requires the highest level of preparedness to intervene urgently. I participated in the decision-making and personally managed or directed the management of the following life and organ supporting interventions that required my frequent assessment to treat or prevent imminent deterioration. I personally spent 45 minutes of critical care time. This is time spent at this critically ill patient's bedside actively involved in patient care as well as the coordination of care. This does not include any procedural time which has been billed separately.     Albert Leach MD  Critical Care Medicine  [facility]

## 2023-03-16 NOTE — PROGRESS NOTES
Name: Leandra Carver   MRN: [id number]  : 1950      Assessment:  TANNER on CKD-3a: Suspect cardiorenal effects initially. Now has LVAD and  POST OP ATN. Anuric->Requiring CRRT>>now iHD    hypercalcemia/immobilization- also has elevated PTH; may have component of 1ry vs secondary HPT; s/p Calcitonin x 4 doses for high Ca    Possible pancreatitis- with elevated lipase; Hypercalcemia can contribute  Ischemic CM: Recurrent exacerbations. EF 20%. S/p LVAD on 2/15. Required Impella RP for RV support  CVA  Sepsis  BPH   Well differentiated NET Grade 1: noted on EGD 23  Anemia 2 to CKD:  s/p PRBCS  Left UE basilic and radial vein thrombus  Thrombocytopenia  Myopathy  Hypermagnesemia     Was on CVVH. Transition to IHD on 3/6/2023. Calcium is improved. Got iUF yesterday for volume removal   His left IJ Hermelindo catheter was replaced on 3/11/2023 by intensivist  Calcium remains elevated-> wonder if this too is contributing some to his myopathy    Plan/Recommendations:  Ct CVVH-> 4K Prismasol bags. Factor rate at 0cc/hr  Ct Sensipar 60mg BID   Appreciate GI involvemen-> Abd ultrasound  Holding Colchicine  BRIGHT  AM labs      Subjective:  RN at bedside. On CVVH. Did not tolerate Factor rate. On Levophed.      ROS:   UTO    Exam:  Visit Vitals  /60   Pulse 91   Temp 98.8 °F (37.1 °C)   Resp 26   Ht 5' 6\" (1.676 m)   Wt 63.2 kg (139 lb 5.3 oz)   SpO2 100%   BMI 22.49 kg/m²     NAD/Frail  +icterus  +trach  Abd distension  Tr edema-improved  + Jaundice    Current Facility-Administered Medications   Medication Dose Route Frequency Last Admin    bicarbonate dialysis (PRISMASOL) BG K 4/Ca 2.5 5000 ml solution   Extracorporeal DIALYSIS CONTINUOUS New Bag at 23 0742    levETIRAcetam (KEPPRA) injection 500 mg  500 mg IntraVENous Q12H 500 mg at 23 0854    meropenem (MERREM) 1 g in 0.9% sodium chloride (MBP/ADV) 50 mL MBP  1 g IntraVENous Q12H 1 g at 03/16/23 4805    cinacalcet (SENSIPAR) tablet 60 mg  60 mg Oral BID WITH MEALS 60 mg at 03/16/23 0854    insulin NPH (NOVOLIN N, HUMULIN N) injection 10 Units  10 Units SubCUTAneous Q12H 10 Units at 03/15/23 2112    albumin human 25% (BUMINATE) solution 12.5 g  12.5 g IntraVENous Q12H 12.5 g at 03/16/23 0845    oxyCODONE IR (ROXICODONE) tablet 5 mg  5 mg Oral Q4H PRN 5 mg at 03/15/23 8783    oxyCODONE IR (ROXICODONE) tablet 10 mg  10 mg Oral Q4H PRN 10 mg at 03/16/23 0624    pantoprazole (PROTONIX) 40 mg in 0.9% sodium chloride 10 mL injection  40 mg IntraVENous DAILY 40 mg at 03/16/23 0853    labetaloL (NORMODYNE;TRANDATE) injection 5 mg  5 mg IntraVENous Q4H PRN 5 mg at 03/09/23 0307    [Held by provider] heparin 25,000 units in D5W 250 ml infusion  400 Units/hr IntraVENous CONTINUOUS Stopped at 03/10/23 1350    0.9% sodium chloride infusion  14 mL/hr IntraVENous CONTINUOUS 14 mL/hr at 03/09/23 0757    albumin human 25% (BUMINATE) solution 25 g  25 g IntraVENous DIALYSIS PRN 25 g at 03/13/23 0539    insulin lispro (HUMALOG) injection   SubCUTAneous Q6H 1 Units at 03/14/23 0040    hydrALAZINE (APRESOLINE) 20 mg/mL injection 5 mg  5 mg IntraVENous Q6H PRN 5 mg at 03/10/23 1316    hydrALAZINE (APRESOLINE) tablet 5 mg  5 mg Oral PRN      aspirin chewable tablet 81 mg  81 mg Per NG tube DAILY 81 mg at 03/16/23 0854    epoetin heidy-epbx (RETACRIT) injection 10,000 Units  10,000 Units SubCUTAneous Q TUE, THU & SAT 10,000 Units at 03/14/23 2245    EPINEPHrine (ADRENALIN) 10 mg in 0.9% sodium chloride 250 mL infusion  0-10 mcg/min IntraVENous TITRATE Stopped at 03/05/23 0920    dextrose (D50W) injection syrg 25 g  25 g IntraVENous PRN 9 mL at 02/25/23 1225    NOREPINephrine (LEVOPHED) 8 mg in 5% dextrose 250mL (32 mcg/mL) infusion  0.5-16 mcg/min IntraVENous TITRATE 7 mcg/min at 03/16/23 0618    [Held by provider] colchicine tablet 0.6 mg  0.6 mg Oral DAILY 0.6 mg at 03/14/23 0848    heparin (porcine) 1,000 unit/mL injection 1,700 Units  1,700 Units InterCATHeter DIALYSIS PRN 1,700 Units at 03/13/23 0819    And    heparin (porcine) 1,000 unit/mL injection 1,500 Units  1,500 Units InterCATHeter DIALYSIS PRN 1,500 Units at 03/13/23 0819    balsam peru-castor oiL (VENELEX) ointment   Topical BID Given at 03/16/23 0854    [Held by provider] acetaminophen (TYLENOL) solution 650 mg  650 mg Oral Q4H  mg at 03/14/23 1924    [Held by provider] acetaminophen (TYLENOL) suppository 650 mg  650 mg Rectal Q4H  mg at 02/17/23 2013    HYDROmorphone (DILAUDID) injection 0.5 mg  0.5 mg IntraVENous Q3H PRN 0.5 mg at 03/15/23 0316    HYDROmorphone (DILAUDID) injection 1 mg  1 mg IntraVENous Q4H PRN 1 mg at 03/14/23 0848    [Held by provider] heparin 25,000 units in D5W 250 ml infusion  12-25 Units/kg/hr IntraVENous TITRATE Stopped at 03/08/23 0515    glucose chewable tablet 16 g  4 Tablet Oral PRN      glucagon (GLUCAGEN) injection 1 mg  1 mg IntraMUSCular PRN      sodium chloride (NS) flush 5-40 mL  5-40 mL IntraVENous Q8H 10 mL at 03/16/23 0615    sodium chloride (NS) flush 5-40 mL  5-40 mL IntraVENous PRN 40 mL at 02/17/23 1818    naloxone (NARCAN) injection 0.4 mg  0.4 mg IntraVENous PRN      ELECTROLYTE REPLACEMENT NOTE: Nurse to review Serum Potassium and Magnesuim levels and Initiate Electrolyte Replacement Protocol as needed  1 Each Other PRN      dextrose 10 % infusion 0-250 mL  0-250 mL IntraVENous PRN 75 mL at 02/24/23 0220    [Held by provider] acetaminophen (TYLENOL) tablet 650 mg  650 mg Oral Q6H  mg at 03/13/23 2114    ondansetron (ZOFRAN) injection 4 mg  4 mg IntraVENous Q4H PRN 4 mg at 03/09/23 1212    albuterol-ipratropium (DUO-NEB) 2.5 MG-0.5 MG/3 ML  3 mL Nebulization Q6H PRN      bisacodyL (DULCOLAX) suppository 10 mg  10 mg Rectal DAILY PRN 10 mg at 02/22/23 0839    0.45% sodium chloride infusion  10 mL/hr IntraVENous CONTINUOUS Stopped at 03/10/23 1707    dexmedeTOMidine in 0.9 % NaCl (PRECEDEX) 400 mcg/100 mL (4 mcg/mL) infusion soln  0.1-1.5 mcg/kg/hr IntraVENous TITRATE Stopped at 03/08/23 2300       Labs/Data:    Lab Results   Component Value Date/Time    WBC 7.7 03/16/2023 03:48 AM    HGB 7.7 (L) 03/16/2023 03:48 AM    HCT 23.3 (L) 03/16/2023 03:48 AM    PLATELET 741 87/12/6288 03:48 AM    MCV 98.3 03/16/2023 03:48 AM       Lab Results   Component Value Date/Time    Sodium 134 (L) 03/16/2023 03:48 AM    Potassium 4.2 03/16/2023 03:48 AM    Chloride 103 03/16/2023 03:48 AM    CO2 21 03/16/2023 03:48 AM    Anion gap 10 03/16/2023 03:48 AM    Glucose 162 (H) 03/16/2023 03:48 AM    BUN 65 (H) 03/16/2023 03:48 AM    Creatinine 3.26 (H) 03/16/2023 03:48 AM    BUN/Creatinine ratio 20 03/16/2023 03:48 AM    GFR est AA 46 (L) 06/23/2022 09:40 AM    GFR est non-AA 38 (L) 06/23/2022 09:40 AM    eGFR 19 (L) 03/16/2023 03:48 AM    eGFR 69 01/25/2023 11:55 AM    Calcium 10.6 (H) 03/16/2023 03:48 AM       Wt Readings from Last 3 Encounters:   03/16/23 63.2 kg (139 lb 5.3 oz)   01/25/23 55.8 kg (123 lb)   01/23/23 54.9 kg (121 lb)         Intake/Output Summary (Last 24 hours) at 3/16/2023 1315  Last data filed at 3/16/2023 1300  Gross per 24 hour   Intake 1292.97 ml   Output 1913.1 ml   Net -620.13 ml         Patient seen and examined. Chart reviewed. Labs, data and other pertinent notes reviewed in last 24 hrs.     PMH/SH/FH reviewed and unchanged compared to H&P    Discussed case with CVICU RN      Heidi Boo MD  [facility]

## 2023-03-16 NOTE — CONSULTS
Palliative medicine brief note-    F team LCSW Mundo Law has scheduled a [address] meeting with family at 145 PM on Friday 3/17. Dr. Vanesa Pope is aware.

## 2023-03-16 NOTE — DIALYSIS
CRRT / [phone]    Orders   Mode: CVVH rounding   Blood Flow Rate: 200 ml/min   Prismasol Dose: PBP: 800 ml/hr  Replacement 1: 400 ml/hr  Replacement 2: 400 ml/hr   Prismasol Concentrate: 4K / 2.5Ca   Blood Warmer Temp: 37*c   Net Fluid Removal: 50 ml/hr      Metrics   BP: 108/60   HR: 97   Access Pressure: -49   Filter Pressure: 125   Return Pressure: 38   TMP: 79   Pressure Drop: 47     Access   Type & Location: LIJ non-tunneled CVC C/D/I with transparent dressing and biopatch in place dated 03/14/23. Labs   HBsAg (Antigen) / date: Negative  03/13/23                                              HBsAb (Antibody) / date: Susceptible  03/13/23   Source: [employer]     Safety:   Time Out Done:   (Time) 0015   Consent obtained/signed: Verified   Education: Access/Site Care   Primary Nurse Rpt Pre: REGGIE Mckeon RN   Primary Nurse Rpt Post: REGGIE Mckeon RN     Comments / Plan:   Patient, orders, line and consent verified. Labs, notes and code status reviewed. [id number] filter running well with no indications for change at this time. Lines visible/secure with blood warmer attached to the return line and set to 37C*. Education and Pre/Post with primary RN.

## 2023-03-16 NOTE — PROGRESS NOTES
0800 Bedside shift change report given to [address] (oncoming nurse) by Poli Ramos (offgoing nurse). Report included the following information SBAR, Kardex, ED Summary, OR Summary, Procedure Summary, Intake/Output, MAR, Accordion, Recent Results, and Cardiac Rhythm NSR w PVCs . Assessment completed and LVAD settings noted. 1000 RT called for SBT; will complete soon. 1050 SBT initiated by RT- patient failed due to tachypnea and low TV.    1115 US ABD completed at bedside. Plan for CT this afternoon. Ruddy Saenz 3701 completed. 1240 PT working with patient- ROM performed. 1600 LVAD dressing changed- tunneling still present with erythema around site. 1930 Bedside shift change report given to [address] [facility] (oncoming nurse) by Rohit Santiago RN (offgoing nurse). Report included the following information SBAR, Kardex, ED Summary, OR Summary, Procedure Summary, Intake/Output, MAR, Accordion, Recent Results, and Cardiac Rhythm NSR w PVCs .

## 2023-03-16 NOTE — PROGRESS NOTES
2000: Bedside and Verbal shift change report given to Mina Bill RN (oncoming nurse) by Sumeet Minaya RN (offgoing nurse). Report included the following information SBAR, Kardex, and Recent Results.

## 2023-03-16 NOTE — PROGRESS NOTES
Problem: Mobility Impaired (Adult and Pediatric)  Goal: *Acute Goals and Plan of Care (Insert Text)  Description: FUNCTIONAL STATUS PRIOR TO ADMISSION: Patient was modified independent using a rollator for functional mobility. Pt recently d/c home after previous hospital stay. Able to ambulate community distances. HOME SUPPORT PRIOR TO ADMISSION: The patient lived with spouse but did not require assist.    Physical Therapy Goals  Initiated 3/12/2023  1. Patient will move from supine to sit and sit to supine , scoot up and down, and roll side to side in bed with maximal assistance within 7 days. 2.  Patient will sit EOB x3 minutes with max assist within 7 days. 3.  Patient will follow 50% of commands for participation in AROM while supine within 7 days. 4.  Patient will tolerate bed in chair position 30 min BID within 7 days. 5.  Family will be independent and compliant with in PROM for all extremities within 7 days. Updated 2/13/2023  1. Patient will move from supine to sit and sit to supine in bed with modified independence within 7 day(s). MET 2/13  2. Patient will transfer from bed to chair and chair to bed with modified independence using the least restrictive device within 7 day(s). MET 2/13  3. Patient will perform sit to stand with modified independence within 7 day(s). MET 2/13  4. Patient will ambulate with modified independence for 300 feet with the least restrictive device within 7 day(s). MET 2/13  5. Patient will ascend/descend 12 stairs with 1 handrail(s) with supervision/set-up within 7 day(s). 6.  Patient will verbally and functionally recall move in the tube techniques in prep for possible LVAD within 7 days. Physical Therapy Goals  Initiated 1/28/2023-- Goals appropriate and add #6 2/6/2023  1. Patient will move from supine to sit and sit to supine  in bed with modified independence within 7 day(s).     2.  Patient will transfer from bed to chair and chair to bed with modified independence using the least restrictive device within 7 day(s). 3.  Patient will perform sit to stand with modified independence within 7 day(s). 4.  Patient will ambulate with modified independence for 300 feet with the least restrictive device within 7 day(s). 5.  Patient will ascend/descend 12 stairs with 1 handrail(s) with supervision/set-up within 7 day(s). 6.  Patient will verbally and functionally recall move in the tube techniques in prep for possible LVAD within 7 days. Outcome: Progressing Towards Goal      PHYSICAL THERAPY TREATMENT  Patient: Harsha Cedeno (76 y.o. male)  Date: 3/16/2023  Diagnosis: CHF (congestive heart failure) ([facility]) [I50.9] <principal problem not specified>  Procedure(s) (LRB):  LEFT GROIN RP IMPELLA INSERTION, KIARRA BY DR Aleksandr Franklin (Left) 26 Days Post-Op  Precautions: Fall (mindful movement)  Chart, physical therapy assessment, plan of care and goals were reviewed. ASSESSMENT  Patient continues with skilled PT services and is progressing towards goals. Pt had more time with brief opening of eyes. Pt performed partial wave x2 during the time with eyes open. Pt had one incident of minimally squeezing right hand on command. No command following in LE. Pt with tone and clonus noted in right LE. Left LE flaccid. Pt did express pain periodically at end range. Right UE clonus in hand. Pt has movement of right UE but not consistent on command. Left UE flaccid     Current Level of Function Impacting Discharge (mobility/balance): total A    Other factors to consider for discharge: Medical complexity          PLAN :  Patient continues to benefit from skilled intervention to address the above impairments. Continue treatment per established plan of care. to address goals.     Recommendation for discharge: (in order for the patient to meet his/her long term goals)  To be determined: depending on progression     This discharge recommendation:  Has not yet been discussed the attending provider and/or case management    IF patient discharges home will need the following DME: to be determined (TBD)       SUBJECTIVE:   Patient non verbal     OBJECTIVE DATA SUMMARY:   Critical Behavior:  Neurologic State: Eyes open spontaneously  Orientation Level: Unable to verbalize  Cognition: No command following  Safety/Judgement: Awareness of environment, Fall prevention, Insight into deficits  Functional Mobility Training: Total Assist                 Therapeutic Exercises:   ROM to LE - ankle ROM, hip abd/adduction. Hip IR/EX, knee flexion/extension    ROM to UE bicep curls, supination/pronation, finger flexion and extension       Pain Rating:  Periodic expressed with mobility    Activity Tolerance:   Poor    After treatment patient left in no apparent distress:   Supine in bed, Heels elevated for pressure relief, and Caregiver / family present    COMMUNICATION/COLLABORATION:   The patients plan of care was discussed with: Registered nurse.      Jacob Pires PTA   Time Calculation: 24 mins

## 2023-03-16 NOTE — PROGRESS NOTES
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 Fatou Rosario  C/b RV failure requiring Impella RP placed 23 and discontinued 3/1/23  C/b acute on chronic renal failure, requiring CRRT  C/b hepatic failure, TB 12 (peak 15.3)  C/b lactic acidosis, improved  C/b persistent septic shock c/b vasodilation and high outputs, on multiple antibiotics, procalcitonin persistently elevated  C/b R SUPA occlusion with small area of matched perfusion defect - subacute infarct c/b left sided hemiparesis  C/b pancreatitis, persistent  C/b failure to extubate x 2 s/p tracheostomy  Patient was approved for LVAD implantation as destination therapy at [facility] 2/3/23; the following have been identified and d/w patient as relative concerns pertaining to LVAD candidacy: small body surface area, cachexia, BMI 18, malnutrition, frailty, advanced age, muscular deconditioning, PVD (fingertips), urinary retention requiring donnelly catheter, neuroendocrine tumor class 1 requiring treatment after LVAD, mild neurocognitive dysfunction, chronic kidney disease and hearing loss requiring hearing aid  Family meeting yesterday lead by Dr. Fatou Rosario, Dr. Myriam Burrows, Dr. Dhaliwal Kidney and Dr. Felipe Cabello  Patient remains critically ill, improved RV and respiratory status; persistent liver failure and pancreatitis complicating picture; we are still hopeful for recovery over the next two week, but would like to meet with family to prepare for \"what ifs\"  Family meeting scheduled on Friday at 1:45pm with AHF, intensivist, palliative care        RECOMMENDATIONS:  Continue LVAD speed at 4700rpm  Use hydralazine 5mg IV q4h prn MAP>85 or SBP > 110 mmHg  No beta-blockers due to hypotension and RV conditioning prior to LVAD  Cannot tolerate ARB/ARNi due to hypotension and upcoming surgical procedure   Cannot tolerate spironolactone due to hyperkalemia  Cannot tolerate jardiance due to significant diuresis on smallest dose/contributed to IVVD  Previously discontinued corlanor due to SVT  Digoxin stopped d/t risk for toxicity with amio loading  Discontinued amio due to intermitted heart blocks under pacemaker  HD per nephrology switch to CRRT to keep consistently goal CVP 8-10  Keep K+ >4 and Mg >2  ID recommendations appreciated - pan culture NGTD  No colchicine per nephrology  Antibiotics per intensivist  GI consult appreciated  Hold ASA d/t CVA   Continue to hold coumadin  Heparin gtt on hold for now due to slightly progressive on SAH on head CT  Hepatology recommendations appreciated   Cannot tolerate statins due to abnormal LFT  Management of tube feeds in light of pancreatitis per intensivist  Consider GI consultation  Continue bowel regimen   Daily dressing changes to Drive line exit site  Pulmonary hygiene- continue SBT   VAD education with caregivers/patient when able by VAD coordinator  D/w  bedside staff      INTERVAL HISTORY:  Low grade temp  MAPs 70-80s, HR 90-100s  CVP 12  I/O net positive 451cc, dialysis 1 liters  Hgb down to 8.3  INR 1.6  Ca down to 10.6  Cr 3.1  TB 22.6 from 20.7 from 20.6 from 19.4  LFTs unchanged  Lipase > 3000  Lactic 1.9  ESR 67 from 56  Procalcitonin 5.2 from 4.9  Lactic 1.6 from 1.9       IMPRESSION:  Acute on chronic combined systolic/diastolic  heart failure  Stage D, NYHA class IV symptoms   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, LVEF 20% (by echo 1/2023) and 23% (by cMRI 1/3/23)  Cardiac MRI suggestive of ischemic cardiomyopathy  PYP equivocal  S/p HeartMate 3  LVAD-DT (2/15/23)  C/b RV failure requiring Impella RP placed 2/18/23 and discontinued 3/1/23  C/b acute on chronic renal failure, requiring CRRT  C/b hepatic failure, TB 12 (peak 15.3)  C/b lactic acidosis, persistent  C/b persistent septic shock c/b vasodilation and high outputs, on multiple antibiotics, procalcitonin persistently elevated  C/b R SUPA occlusion with small area of matched perfusion defect - subacute infarct c/b left sided hemiparesis  C/b pancreatitis  C/b failure to extubate x 2; anticipating tracheostomy this week  Coronary artery disease  CAD s/p CABG x 2: further disease best managed medically due to small vessel size   At risk of sudden cardiac death  Peripheral arterial disease  Bilateral hydronephrosis s/p donnelly  Cardiac risk factors:  HTN  HL  TRISTAN, STOP-BANG 4  DM2  CKD, stage 3  MOCA from 2/9 19/30, consistent with mild cognitive impairment  Hard of hearing  Gastric Neuroendocrine Tumor, elevated gastrin and chromogranin A  Septic shock, improving   Left sided weakness noted night 3/1. +SAH and subacute occlusion distal R cerebral artery         LIFE GOALS: not readdressed today   Patient's personal goals included: having a few more years with family  Important upcoming milestones or family events: None at this time   The patient identified the following as important for living well: being at home, not being SOB            CXR (3/5/23) mild pulmonary edema. Small pleural effusions and bibasilar airspace opacities. Head CTA (3/2/23) Occlusion distal right anterior cerebral artery, with associated small area of matched perfusion defect and cortical enhancement, consistent with subacute infarct. Diminutive and irregular right vertebral artery, occluded at the V3-4 junction, age indeterminate extensive multifocal atherosclerosis in the intracranial and extracranial circulation. CARDIAC IMAGING:   Echo (3/13/23)    Left Ventricle: Severely reduced left ventricular systolic function with a visually estimated EF of 25 - 30%. Left ventricle is dilated.  Normal wall thickness. Unable to assess wall motion. Right Ventricle: Moderately reduced systolic function. TAPSE is abnormal. TAPSE is 1.1 cm. LVEDD 5.3cm     ECHO- 3/6/23       Left Ventricle: Severely reduced left ventricular systolic function with a visually estimated EF of 15 - 20%. Left ventricle is moderately dilated. LVAD is present. Right Ventricle: Right ventricle is moderately dilated. Mildly reduced systolic function. Echo 2/19/23    Left Ventricle: Severely reduced left ventricular systolic function with a visually estimated EF of 20 - 25%. Not well visualized. Left ventricle size is normal. Increased wall thickness. Unable to assess wall motion. Abnormal diastolic function. LVAD is present. LVAD inflow cannula was not well visualized. Right Ventricle: Not well visualized. Right ventricle is mildly dilated. Moderately reduced systolic function. TAPSE is abnormal.    Mitral Valve: Severe annular calcification at the anterior and posterior leaflets of the mitral valve. Mild regurgitation. Left Atrium: Left atrium is dilated. Right Atrium: Right atrium is dilated. Technical qualifiers: Echo study was technically difficult and technically difficult with poor endocardial visualization. Echo 1/27/23    Left Ventricle: EF by visual approximation is 20%. Left ventricle is mildly dilated. Mitral Valve: Moderately thickened leaflet, at the anterior and posterior leaflets. Moderately calcified leaflet. Moderate regurgitation. Left Atrium: Left atrium is moderately dilated. Technical qualifiers: Echo study was technically difficult with poor endocardial visualization. Contrast used: Definity. Limited study, not all structures viewed     Echo 1/9/23   Left Ventricle: Severely reduced left ventricular systolic function with a visually estimated EF of 25 - 30%. Left ventricle size is normal. Mildly increased wall thickness.      Echo 12/26/22    Left Ventricle: Severely reduced left ventricular systolic function with a visually estimated EF of 25 - 30%. Left ventricle size is normal. Normal wall thickness. There are regional wall motion abnormalities. Grade II diastolic dysfunction with increased LAP. Right Ventricle: Moderately reduced systolic function. TAPSE is abnormal. TAPSE is 1.1 cm. Aortic Valve: Mild stenosis of the aortic valve. AV peak gradient is 13 mmHg. AV peak velocity is 1.8 m/s. Mitral Valve: Not well visualized. Moderate annular calcification at the posterior leaflet of the mitral valve. Mild to moderate regurgitation. Tricuspid Valve: Mildly elevated RVSP. Left Atrium: Left atrium is moderately dilated. 12/8/22    Left Ventricle: Moderately reduced left ventricular systolic function with a visually estimated EF of 35 - 40%. Severe hypokinesis of the following segments: mid anteroseptal, apical anterior, apical septal, apical inferior and apical lateral. Severe hypokinesis of the apex. Mitral Valve: Severely thickened leaflet, at the anterior and posterior leaflets. Severely calcified leaflet, at the anterior and posterior leaflets. Mild annular calcification of the mitral valve. Moderate regurgitation. Left Atrium: Left atrium is mildly dilated. Contrast used: Definity. limited study     EKG 12/22/22 ST, Biatria enlargement, marked ST abnormality     [facility] 12/6/22  1. Normal LVEDP  2. Severe native multivessel coronary artery disease  3. Patent LIMA to LAD and vein graft to distal RCA  4. Recurrent ISR in OM1 stent with now 60 to 70% restenosis  5. Recoil of left main and circumflex stent with now recurrent 40 to 50% stenosis. 6.  Progression of ostial left main disease now to about 60% stenosis  7. Progression of disease in jailed first marginal branch now with diffuse 90% stenosis  8.   High-grade stenosis in the mid to distal right potential femoral artery treated with 6 x 40 mm impact drug-coated balloon angioplasty to reduce the stenosis to less than 40%        HEMODYNAMICS:  RHC 1/9/23  PA 20/9, RA 3, PCWP 8, CI 1.8     CPEST too ill   6MW 300 feet   Physical Assessment:   Physical Exam  Vitals and nursing note reviewed. Constitutional:       Appearance: He is ill-appearing, trach on vent   Cardiovascular:      Rate and Rhythm: Regular rhythm. Tachycardia present. Heart sounds: Normal heart sounds. No murmur heard. Comments: + VAD hum  Pulmonary:      Breath sounds: Rhonchi present. Comments: intubated  Abdominal:      General: There is distension. Palpations: Abdomen is soft. Musculoskeletal:         General: No swelling. Skin:     General: Skin is warm and dry. Coloration: Skin is jaundiced. Neurological:      Mental Status: He is alert.       Comments: sedated         REVIEW OF SYSTEMS:  Review of Systems   Unable to perform ROS: Acuity of condition    LVAD INTERROGATION:  Device interrogated in person  Device function normal, normal flow, no events  LVAD   Pump Speed (RPM): 4700  Pump Flow (LPM): 3.4  MAP: 80 (L brachial doppler)  PI (Pulsitility Index): 4.9  Power: 3   Test: No  Back Up  at Bedside & Labeled: Yes  Power Module Test: No  Driveline Site Care: No  Driveline Dressing: Clean, Dry, and Intact  Testing  Alarms Reviewed: Yes  Back up SC speed: 4700  Back up Low Speed Limit: 4300  Emergency Equipment with Patient?: Yes  Emergency procedures reviewed?: Yes  Drive line site inspected?: No  Drive line intergrity inspected?: Yes  Drive line dressing changed?: No    PHYSICAL EXAM:  Visit Vitals  /60   Pulse 94   Temp 98.8 °F (37.1 °C)   Resp 22   Ht 5' 6\" (1.676 m)   Wt 139 lb 5.3 oz (63.2 kg)   SpO2 100%   BMI 22.49 kg/m²       PAST MEDICAL HISTORY:  Past Medical History:   Diagnosis Date    CAD (coronary artery disease) 11/10/2016    NSTEMI & 2 stents    Deafness 10/28/2012    DM (diabetes mellitus) (HCC)     Elevated cholesterol     Hypertension     NSTEMI (non-ST elevated myocardial infarction) ([facility] [address] 11/10/2016       PAST SURGICAL HISTORY:  Past Surgical History:   Procedure Laterality Date    COLONOSCOPY N/A 6/28/2018    COLONOSCOPY performed by Julito Thompson MD at [facility] ENDOSCOPY    COLONOSCOPY N/A 1/18/2023    COLONOSCOPY performed by Dorothy Pope MD at [facility] ENDOSCOPY    [address]  11/11/2016    2 stents       FAMILY HISTORY:  Family History   Problem Relation Age of Onset    Heart Disease Father     Heart Attack Father     Hypertension Mother     Elevated Lipids Brother     Elevated Lipids Brother     No Known Problems Sister     Elevated Lipids Brother     No Known Problems Son     No Known Problems Daughter     Anesth Problems Neg Hx        SOCIAL HISTORY:  Social History     Socioeconomic History    Marital status:    Tobacco Use    Smoking status: Never     Passive exposure: Never    Smokeless tobacco: Never   Vaping Use    Vaping Use: Never used   Substance and Sexual Activity    Alcohol use: Yes     Alcohol/week: 2.0 standard drinks     Types: 1 Cans of beer, 1 Shots of liquor per week     Comment: rarely    Drug use: No    Sexual activity: Yes     Social Determinants of Health     Financial Resource Strain: Medium Risk    Difficulty of Paying Living Expenses: Somewhat hard   Food Insecurity: Food Insecurity Present    Worried About Running Out of Food in the Last Year: Never true    Ran Out of Food in the Last Year: Often true       LABORATORY RESULTS:     Labs Latest Ref Rng & Units 3/16/2023 3/15/2023 3/15/2023 3/14/2023 3/13/2023 3/12/2023 3/11/2023   WBC 4.1 - 11.1 K/uL 7.7 - 7.6 8.0 8.1 7.1 -   RBC 4.10 - 5.70 M/uL 2.37(L) - 2.61(L) 2.60(L) 2.64(L) 2.50(L) -   Hemoglobin 12.1 - 17.0 g/dL 7. 7(L) - 8. 3(L) 8. 3(L) 8.4(L) 8. 1(L) 9.0(L)   Hematocrit 36.6 - 50.3 % 23. 3(L) - 26. 2(L) 25. 7(L) 26. 4(L) 25. 2(L) 28. 4(L)   MCV 80.0 - 99.0 FL 98.3 - 100. 4(H) 98.8 100. 0(H) 100. 8(H) -   Platelets 636 - 657 K/uL 234 - 271 233 260 239 -   Lymphocytes 12 - 49 % 9(L) - 10(L) 9(L) 8(L) 8(L) -   Monocytes 5 - 13 % 16(H) - 13 14(H) 14(H) 13 -   Eosinophils 0 - 7 % 6 - 7 8(H) 3 3 -   Basophils 0 - 1 % 1 - 1 1 0 0 -   Albumin 3.5 - 5.0 g/dL 3.4(L) - 3.6 3.5 3.5 4.0 -   Calcium 8.5 - 10.1 MG/DL 10. 6(H) 11. 7(H) 11. 8(H) 11. 9(H) 12. 1(H) 10. 6(H) -   Glucose 65 - 100 mg/dL 162(H) - 102(H) 197(H) 205(H) 200(H) -   BUN 6 - 20 MG/DL 65(H) - 87(H) 62(H) 73(H) 52(H) -   Creatinine 0.70 - 1.30 MG/DL 3.26(H) - 4.53(H) 3.36(H) 4.28(H) 3.17(H) -   Sodium 136 - 145 mmol/L 134(L) - 136 137 138 138 -   Potassium 3.5 - 5.1 mmol/L 4.2 - 3.9 3.5 3.9 3.8 -   TSH 0.36 - 3.74 uIU/mL - - - - - - -   PSA 0.01 - 4.0 ng/mL - - - - - - -   LDH 85 - 241 U/L 289(H) - 282(H) 259(H) 256(H) 272(H) -   Some recent data might be hidden     Lab Results   Component Value Date/Time    TSH 3.52 01/28/2023 05:26 AM    TSH 2.12 12/27/2022 02:36 PM    TSH 4.80 (H) 12/06/2022 03:53 AM    TSH 5.39 (H) 10/12/2022 09:10 AM    TSH 3.53 02/03/2022 11:47 AM    TSH 5.790 (H) 11/21/2019 04:45 PM    TSH 3.08 06/22/2018 01:53 PM    TSH 4.250 05/26/2015 09:43 AM       ALLERGY:  Allergies   Allergen Reactions    Ambien [Zolpidem] Other (comments)     Causes extreme confusion        CURRENT MEDICATIONS:    Current Facility-Administered Medications:     bicarbonate dialysis (PRISMASOL) BG K 4/Ca 2.5 5000 ml solution, , Extracorporeal, DIALYSIS CONTINUOUS, Jadiel Galvan MD, Last Rate: 1,600 mL/hr at 03/16/23 1408, New Bag at 03/16/23 1408    levETIRAcetam (KEPPRA) injection 500 mg, 500 mg, IntraVENous, Q12H, Vimal Mcfarlane MD, 500 mg at 03/16/23 0854    [COMPLETED] meropenem (MERREM) 1 g in 0.9% sodium chloride 20 mL IV syringe, 1 g, IntraVENous, NOW, 1 g at 03/15/23 1734 **FOLLOWED BY** meropenem (MERREM) 1 g in 0.9% sodium chloride (MBP/ADV) 50 mL MBP, 1 g, IntraVENous, Q12H, Vimal Mcfarlane MD, Last Rate: 16.7 mL/hr at 03/16/23 0608, 1 g at 03/16/23 0608    cinacalcet (SENSIPAR) tablet 60 mg, 60 mg, Oral, BID WITH MEALS, Jadiel Galvan MD, 60 mg at 03/16/23 0854    insulin NPH (NOVOLIN N, HUMULIN N) injection 10 Units, 10 Units, SubCUTAneous, Q12H, Cathy Sheldon CNS, 10 Units at 03/15/23 2112    albumin human 25% (BUMINATE) solution 12.5 g, 12.5 g, IntraVENous, Q12H, Lizette Linton NP, 12.5 g at 03/16/23 0845    oxyCODONE IR (ROXICODONE) tablet 5 mg, 5 mg, Oral, Q4H PRN, [place] G, DO, 5 mg at 03/15/23 3947    oxyCODONE IR (ROXICODONE) tablet 10 mg, 10 mg, Oral, Q4H PRN, Crystal Andrew, DO, 10 mg at 03/16/23 0624    pantoprazole (PROTONIX) 40 mg in 0.9% sodium chloride 10 mL injection, 40 mg, IntraVENous, DAILY, Walker Buitrago MD, 40 mg at 03/16/23 0853    labetaloL (NORMODYNE;TRANDATE) injection 5 mg, 5 mg, IntraVENous, Q4H PRN, Promise MOLINA MD, 5 mg at 03/09/23 0307    [Held by provider] heparin 25,000 units in D5W 250 ml infusion, 400 Units/hr, IntraVENous, CONTINUOUS, Lizette Linton NP, Stopped at 03/10/23 1350    0.9% sodium chloride infusion, 14 mL/hr, IntraVENous, CONTINUOUS, Promise MOLINA MD, Last Rate: 14 mL/hr at 03/09/23 0757, 14 mL/hr at 03/09/23 0757    albumin human 25% (BUMINATE) solution 25 g, 25 g, IntraVENous, DIALYSIS PRN, Misti Mcclain MD, 25 g at 03/13/23 0539    insulin lispro (HUMALOG) injection, , SubCUTAneous, Q6H, Promise MOLINA MD, 1 Units at 03/14/23 0040    hydrALAZINE (APRESOLINE) 20 mg/mL injection 5 mg, 5 mg, IntraVENous, Q6H PRN, Sloan Whaley MD, 5 mg at 03/10/23 1316    hydrALAZINE (APRESOLINE) tablet 5 mg, 5 mg, Oral, PRN, Sloan Whaley MD    aspirin chewable tablet 81 mg, 81 mg, Per NG tube, DAILY, Siva Goodwin MD, 81 mg at 03/16/23 0854    epoetin heidy-epbx (RETACRIT) injection 10,000 Units, 10,000 Units, SubCUTAneous, Q TUE, THU & SAT, Abou-Assi, David Phillip MD, 10,000 Units at 03/14/23 4089    EPINEPHrine (ADRENALIN) 10 mg in 0.9% sodium chloride 250 mL infusion, 0-10 mcg/min, IntraVENous, TITRATE, Moise Moeller MD, Stopped at 03/05/23 0920    dextrose (D50W) injection syrg 25 g, 25 g, IntraVENous, PRN, Moise Moeller MD, 9 mL at 02/25/23 1225    NOREPINephrine (LEVOPHED) 8 mg in 5% dextrose 250mL (32 mcg/mL) infusion, 0.5-16 mcg/min, IntraVENous, TITRATE, Juan Jose Johnson MD, Last Rate: 13.1 mL/hr at 03/16/23 0618, 7 mcg/min at 03/16/23 0618    [Held by provider] colchicine tablet 0.6 mg, 0.6 mg, Oral, DAILY, Lizette Linton NP, 0.6 mg at 03/14/23 0848    heparin (porcine) 1,000 unit/mL injection 1,700 Units, 1,700 Units, InterCATHeter, DIALYSIS PRN, 1,700 Units at 03/13/23 0819 **AND** heparin (porcine) 1,000 unit/mL injection 1,500 Units, 1,500 Units, InterCATHeter, DIALYSIS PRN, Natalie Broussard DO, 1,500 Units at 03/13/23 0819    balsam peru-castor oiL (VENELEX) ointment, , Topical, BID, Arturo Shaw MD, Given at 03/16/23 0854    [Held by provider] acetaminophen (TYLENOL) solution 650 mg, 650 mg, Oral, Q4H PRN, Moise Moeller MD, 650 mg at 03/14/23 1924    [Held by provider] acetaminophen (TYLENOL) suppository 650 mg, 650 mg, Rectal, Q4H PRN, Moise Moeller MD, 650 mg at 02/17/23 2013    HYDROmorphone (DILAUDID) injection 0.5 mg, 0.5 mg, IntraVENous, Q3H PRN, Allen MOLINA MD, 0.5 mg at 03/16/23 1342    HYDROmorphone (DILAUDID) injection 1 mg, 1 mg, IntraVENous, Q4H PRN, Allen MOLINA MD, 1 mg at 03/14/23 0848    [Held by provider] heparin 25,000 units in D5W 250 ml infusion, 12-25 Units/kg/hr, IntraVENous, TITRATE, Herb Mcintyre MD, Stopped at 03/08/23 0515    glucose chewable tablet 16 g, 4 Tablet, Oral, PRN, Shaila, Lizette B, NP    glucagon (GLUCAGEN) injection 1 mg, 1 mg, IntraMUSCular, PRN, Shaila, Lizette B, NP    sodium chloride (NS) flush 5-40 mL, 5-40 mL, IntraVENous, Q8H, Shaila, Lizette B, NP, 10 mL at 03/16/23 0615    sodium chloride (NS) flush 5-40 mL, 5-40 mL, IntraVENous, PRN, Shaila, Lizette B, NP, 40 mL at 02/17/23 1818    naloxone [facility]) injection 0.4 mg, 0.4 mg, IntraVENous, PRN, Shaila, Lizette B, NP    ELECTROLYTE REPLACEMENT NOTE: Nurse to review Serum Potassium and Magnesuim levels and Initiate Electrolyte Replacement Protocol as needed, 1 Each, Other, PRN, Shaila, Lizette B, NP    dextrose 10 % infusion 0-250 mL, 0-250 mL, IntraVENous, PRN, Shaila, Lizette B, NP, Last Rate: 150 mL/hr at 02/24/23 0220, 75 mL at 02/24/23 0220    [Held by provider] acetaminophen (TYLENOL) tablet 650 mg, 650 mg, Oral, Q6H PRN, Shaila, Lizette B, NP, 650 mg at 03/13/23 2114    ondansetron (ZOFRAN) injection 4 mg, 4 mg, IntraVENous, Q4H PRN, Shaila, Lizette B, NP, 4 mg at 03/09/23 1212    albuterol-ipratropium (DUO-NEB) 2.5 MG-0.5 MG/3 ML, 3 mL, Nebulization, Q6H PRN, Shaila, Lizette B, NP    bisacodyL (DULCOLAX) suppository 10 mg, 10 mg, Rectal, DAILY PRN, Shaila, Lizette B, NP, 10 mg at 02/22/23 0839    0.45% sodium chloride infusion, 10 mL/hr, IntraVENous, CONTINUOUS, Nasir Nichols MD, Stopped at 03/10/23 1707    dexmedeTOMidine in 0.9 % NaCl (PRECEDEX) 400 mcg/100 mL (4 mcg/mL) infusion soln, 0.1-1.5 mcg/kg/hr, IntraVENous, TITRATE, Jasmine Johnson MD, Stopped at 03/08/23 2300    PATIENT CARE TEAM:  Patient Care Team:  Amando Ross MD as PCP - General (Family Medicine)  Amando Ross MD as PCP - [facility] Provider  Katelynn Callaway MD (Cardiovascular Disease Physician)  Hermes Carreon MD (Gastroenterology)  General Pily MD (Cardiothoracic Surgery)  Olu Parker MD (Cardiovascular Disease Physician)  Lucita Kelly MD (Nephrology)  Meredith Briceño MD (Pulmonary Disease)  Nasir Nichols MD ([address] [facility] Vascular Surgery)     Thank you for allowing me to participate in this patient's care.     Elayne Graves MD   [address] [place] [address], [address]  Phone: [phone]    Critically ill  Critical care 40min

## 2023-03-16 NOTE — PROGRESS NOTES
[facility].  [address] [address] [address], [address]  [phone]                     GI PROGRESS NOTE    Patient Name: Anna Webber      : 1950      MRN: [id number]  Admit Date: 2023  Today's Date: 3/16/2023  CC: pancreatitis     Subjective:     Mr. Gelacio Lindsay is laying in bed, non responds to commands or questions. Objective:     Blood pressure 108/60, pulse 94, temperature 98.4 °F (36.9 °C), resp. rate 21, height 5' 6\" (1.676 m), weight 63.2 kg (139 lb 5.3 oz), SpO2 100 %. Physical Exam:  General appearance: minimally responsive. Skin: Extremities and face reveal no rashes. + jaundice   HEENT: Sclerae icteric. Cardiovascular: Regular rate and rhythm. +LVAD hum   Respiratory: Ventilator dependent via trach   GI: Abdomen nondistended, soft. + bowel sounds. DHT in place   Rectal: Deferred   Musculoskeletal: No pitting edema of the lower legs.     Neurological: minimally responsive to pain         Data Review:    Recent Results (from the past 24 hour(s))   GLUCOSE, POC    Collection Time: 03/15/23  8:42 PM   Result Value Ref Range    Glucose (POC) 162 (H) 65 - 117 mg/dL    Performed by [address] [place] [address], POC    Collection Time: 23 12:11 AM   Result Value Ref Range    Glucose (POC) 151 (H) 65 - 117 mg/dL    Performed by Brandan Mg    NT-PRO BNP    Collection Time: 23  3:48 AM   Result Value Ref Range    NT pro-BNP 17,557 (H) <125 PG/ML   MAGNESIUM    Collection Time: 23  3:48 AM   Result Value Ref Range    Magnesium 3.0 (H) 1.6 - 2.4 mg/dL   LD    Collection Time: 23  3:48 AM   Result Value Ref Range     (H) 85 - 241 U/L   PROTHROMBIN TIME + INR    Collection Time: 23  3:48 AM   Result Value Ref Range    INR 1.6 (H) 0.9 - 1.1      Prothrombin time 16.3 (H) 9.0 - 11.1 sec   PHOSPHORUS    Collection Time: 23  3:48 AM   Result Value Ref Range    Phosphorus 3.4 2.6 - 4.7 MG/DL   METABOLIC PANEL, COMPREHENSIVE Collection Time: 03/16/23  3:48 AM   Result Value Ref Range    Sodium 134 (L) 136 - 145 mmol/L    Potassium 4.2 3.5 - 5.1 mmol/L    Chloride 103 97 - 108 mmol/L    CO2 21 21 - 32 mmol/L    Anion gap 10 5 - 15 mmol/L    Glucose 162 (H) 65 - 100 mg/dL    BUN 65 (H) 6 - 20 MG/DL    Creatinine 3.26 (H) 0.70 - 1.30 MG/DL    BUN/Creatinine ratio 20 12 - 20      eGFR 19 (L) >60 ml/min/1.73m2    Calcium 10.6 (H) 8.5 - 10.1 MG/DL    Bilirubin, total 23.5 (H) 0.2 - 1.0 MG/DL    ALT (SGPT) 173 (H) 12 - 78 U/L    AST (SGOT) 313 (H) 15 - 37 U/L    Alk. phosphatase 394 (H) 45 - 117 U/L    Protein, total 6.6 6.4 - 8.2 g/dL    Albumin 3.4 (L) 3.5 - 5.0 g/dL    Globulin 3.2 2.0 - 4.0 g/dL    A-G Ratio 1.1 1.1 - 2.2     LACTIC ACID    Collection Time: 03/16/23  3:48 AM   Result Value Ref Range    Lactic acid 2.0 0.4 - 2.0 MMOL/L   CBC WITH AUTOMATED DIFF    Collection Time: 03/16/23  3:48 AM   Result Value Ref Range    WBC 7.7 4.1 - 11.1 K/uL    RBC 2.37 (L) 4.10 - 5.70 M/uL    HGB 7.7 (L) 12.1 - 17.0 g/dL    HCT 23.3 (L) 36.6 - 50.3 %    MCV 98.3 80.0 - 99.0 FL    MCH 32.5 26.0 - 34.0 PG    MCHC 33.0 30.0 - 36.5 g/dL    RDW 25.2 (H) 11.5 - 14.5 %    PLATELET 669 137 - 532 K/uL    MPV 12.5 8.9 - 12.9 FL    NRBC 0.0 0  WBC    ABSOLUTE NRBC 0.00 0.00 - 0.01 K/uL    NEUTROPHILS 67 32 - 75 %    LYMPHOCYTES 9 (L) 12 - 49 %    MONOCYTES 16 (H) 5 - 13 %    EOSINOPHILS 6 0 - 7 %    BASOPHILS 1 0 - 1 %    IMMATURE GRANULOCYTES 1 (H) 0.0 - 0.5 %    ABS. NEUTROPHILS 5.1 1.8 - 8.0 K/UL    ABS. LYMPHOCYTES 0.7 (L) 0.8 - 3.5 K/UL    ABS. MONOCYTES 1.2 (H) 0.0 - 1.0 K/UL    ABS. EOSINOPHILS 0.5 (H) 0.0 - 0.4 K/UL    ABS. BASOPHILS 0.1 0.0 - 0.1 K/UL    ABS. IMM.  GRANS. 0.1 (H) 0.00 - 0.04 K/UL    DF SMEAR SCANNED      RBC COMMENTS ANISOCYTOSIS  3+        RBC COMMENTS MACROCYTOSIS  1+        RBC COMMENTS OVALOCYTES  1+        RBC COMMENTS TARGET CELLS  PRESENT       PROCALCITONIN    Collection Time: 03/16/23  3:48 AM   Result Value Ref Range Procalcitonin 5.32 ng/mL   GLUCOSE, POC    Collection Time: 03/16/23  6:14 AM   Result Value Ref Range    Glucose (POC) 126 (H) 65 - 117 mg/dL    Performed by Angie Escalera, POC    Collection Time: 03/16/23 11:56 AM   Result Value Ref Range    Glucose (POC) 96 65 - 117 mg/dL    Performed by Derek Jules    PHOSPHORUS    Collection Time: 03/16/23  3:55 PM   Result Value Ref Range    Phosphorus 2.6 2.6 - 4.7 MG/DL   CORTISOL    Collection Time: 03/16/23  3:55 PM   Result Value Ref Range    Cortisol, random 14.6 ug/dL         Assessment / Plan :     Mr. Tabitha Torres has had a lengthy hospitalization for HFrEF with LVAD placement 2/15,  Impella placed 2/18 and subsequently removed 3/1. His stay has been complicated by ongoing need for dialysis, inability to wean from ventilator and SAH. GI re-consulted to evaluate hyperbilirubinemia and acute pancreatitis evidenced by lipase > 3000. Multi system organ failure with new onset pancreatitis likely related to hypoprofusion. We will hold tube feeds overnight for abdominal US in AM. After discussion with nephrology consider CT AP scan with contrast- will consult with HF team.            Patient Active Hospital Problem List:   Active Problems:    CHF (congestive heart failure) ([address] (12/12/2022)      Time Spent with Patient: [address],Julito Camp MD            ASSESSMENT AND PLAN:  He appears to Multi system Organ dysfunction with no overall improvement  Palliative care consult to establish realistic goals. US revealed Cirrhosis likely cardiac  and gall stones, CT pending.   Continue supportive care     Lela Jerez MD

## 2023-03-17 NOTE — PROGRESS NOTES
Problem: Mobility Impaired (Adult and Pediatric)  Goal: *Acute Goals and Plan of Care (Insert Text)  Description: FUNCTIONAL STATUS PRIOR TO ADMISSION: Patient was modified independent using a rollator for functional mobility. Pt recently d/c home after previous hospital stay. Able to ambulate community distances. HOME SUPPORT PRIOR TO ADMISSION: The patient lived with spouse but did not require assist.    Physical Therapy Goals  Initiated 3/12/2023  1. Patient will move from supine to sit and sit to supine , scoot up and down, and roll side to side in bed with maximal assistance within 7 days. 2.  Patient will sit EOB x3 minutes with max assist within 7 days. 3.  Patient will follow 50% of commands for participation in AROM while supine within 7 days. 4.  Patient will tolerate bed in chair position 30 min BID within 7 days. 5.  Family will be independent and compliant with in PROM for all extremities within 7 days. Updated 2/13/2023  1. Patient will move from supine to sit and sit to supine in bed with modified independence within 7 day(s). MET 2/13  2. Patient will transfer from bed to chair and chair to bed with modified independence using the least restrictive device within 7 day(s). MET 2/13  3. Patient will perform sit to stand with modified independence within 7 day(s). MET 2/13  4. Patient will ambulate with modified independence for 300 feet with the least restrictive device within 7 day(s). MET 2/13  5. Patient will ascend/descend 12 stairs with 1 handrail(s) with supervision/set-up within 7 day(s). 6.  Patient will verbally and functionally recall move in the tube techniques in prep for possible LVAD within 7 days. Physical Therapy Goals  Initiated 1/28/2023-- Goals appropriate and add #6 2/6/2023  1. Patient will move from supine to sit and sit to supine  in bed with modified independence within 7 day(s).     2.  Patient will transfer from bed to chair and chair to bed with modified independence using the least restrictive device within 7 day(s). 3.  Patient will perform sit to stand with modified independence within 7 day(s). 4.  Patient will ambulate with modified independence for 300 feet with the least restrictive device within 7 day(s). 5.  Patient will ascend/descend 12 stairs with 1 handrail(s) with supervision/set-up within 7 day(s). 6.  Patient will verbally and functionally recall move in the tube techniques in prep for possible LVAD within 7 days. Outcome: Progressing Towards Goal       PHYSICAL THERAPY TREATMENT  Patient: Lorenzo Foreman (76 y.o. male)  Date: 3/17/2023  Diagnosis: CHF (congestive heart failure) ([facility]) [I50.9] <principal problem not specified>  Procedure(s) (LRB):  LEFT GROIN RP IMPELLA INSERTION, KIARRA BY DR Ivania Estrella (Left) 27 Days Post-Op  Precautions: Fall (mindful movement)  Chart, physical therapy assessment, plan of care and goals were reviewed. ASSESSMENT  Patient continues with skilled PT services and is progressing towards goals. Pt had increase time with eyes open during session waving. Alertness is very limited. Pt displayed brief tracking to L/R. Pt inconsistent with command following but did have 2 attempt to extend LE to extension. Also noted clonus/ton on left foot which is new. Per discussion with PT will decrease frequency to 5 week until improved ability to participate. .     Current Level of Function Impacting Discharge (mobility/balance): total A    Other factors to consider for discharge: medical complex         PLAN :  Patient continues to benefit from skilled intervention to address the above impairments. Continue treatment per established plan of care. to address goals. Recommendation for discharge: (in order for the patient to meet his/her long term goals)  To be determined:       This discharge recommendation:  Has not yet been discussed the attending provider and/or case management    IF patient discharges home will need the following DME: to be determined (TBD)       SUBJECTIVE:   Patient non verbal     OBJECTIVE DATA SUMMARY:   Critical Behavior:  Neurologic State: Eyes open spontaneously  Orientation Level: Unable to verbalize  Cognition: No command following  Safety/Judgement: Awareness of environment, Fall prevention, Insight into deficits  Functional Mobility Training:  Bed Mobility:  Rolling: Total assistance                 Transfers:                                   Balance:     Ambulation/Gait Training:                       \  Stairs: Therapeutic Exercises:     LE ROM right LE- clonus with DR that eased with prolong stretch, Knee to chest-pain at end range. Pt had two incident to extend LE on command?or comfort. Hip abduction PROM with some initial resistance    LE ROM left LE- clonus/tone felt in left foot with DF stretch - this is new. PROM hip abd/add, IR/ER knee to chest -pain at end range           Pain Rating:  With movement     Activity Tolerance:   Poor    After treatment patient left in no apparent distress:   Supine in bed and Heels elevated for pressure relief    COMMUNICATION/COLLABORATION:   The patients plan of care was discussed with: Occupational therapist and Registered nurse.      Eboni Delarosa PTA   Time Calculation: 27 mins

## 2023-03-17 NOTE — DIALYSIS
CRRT / [phone]    Orders   Mode: CVVH restarted @1855   Blood Flow Rate: 200 ml/min   Prismasol Dose: PBP: 800 ml/hr  Replacement 1: 400 ml/hr  Replacement 2: 400 ml/hr   Prismasol Concentrate: 4K / 2.5Ca   Blood Warmer Temp: 37*c   Net Fluid Removal: 0 ml/hr      Metrics   BP: 98/53   HR: 90   Access Pressure: -44   Filter Pressure: 155   Return Pressure: 72   TMP: 45   Pressure Drop: 45     Access   Type & Location: LIJ non-tunneled CVC C/D/I with transparent dressing and biopatch in place dated 03/17/23. Labs   HBsAg (Antigen) / date: Negative  03/13/23                                              HBsAb (Antibody) / date: Susceptible  03/13/23   Source: [email or web address]     Safety:   Time Out Done:   (Time) 1850   Consent obtained/signed: Verified   Education: Access/Site Care   Primary Nurse Rpt Pre: ADRIAN Lawrence RN   Primary Nurse Rpt Post: ADRIAN Lawrence RN     Comments / Plan:   Patient, orders, line and consent verified. Labs, notes and code status reviewed. CRRT restarted after patient return from CT scan. All possible blood returned by Primary, RN. Lines visible/secure with blood warmer attached to the return line and set to 37C*. Education and Pre/Post with primary RN.

## 2023-03-17 NOTE — PROGRESS NOTES
0800 assumed care of pt assessment done. Pt is in terrible pain holding his abdomen. Medicated. Dr Lucius Benson here rounding Family meeting with palliative planned for 1345 pm  0900 Dr. Kyaw Sousa here pt responded to him immediately. 1000 BS 66 [id number]  warming blanket applied  1200 blanket off. Pt is very interactive at times. 1300: Wife here visiting pt  5; family meeting with Dr Tonya Miller, Dr. Addison Paz, RN, and Palliative Care and Heaert failure ANP    1600 CRRT rinsed back CRRT for trip to CT. 1710: down to CT per bed on monitor and portable ventiator. ACc by RN PCT and RT  1740 Back on unit. 1900 CRRT restarted  1930 CRRT clotted suddenly with little warning. Sharmin Ellington called to restart. 2000: Bedside and Verbal shift change report given to 1200 [facility]  (oncoming nurse) by [place] [facility] RN (offgoing nurse). Report included the following information Procedure Summary, Intake/Output, MAR, Recent Results, Med Rec Status, and Cardiac Rhythm NSR .

## 2023-03-17 NOTE — DIABETES MGMT
GI on board for hyperbilirubinemia and acute pancreatitis s/t likely related to hypoprofusion. Enteral feeds on hold. Repeat CT scan with contrast and abd [address] with cirrhosis likely cardiac and gall stones,  Transitioned from CRRT to HD    BG off feeds:   NPH has been held for the last 36h when feeds stopped. Will update the order to Q4h as there are concerns for hypoglycemia while feeds are off. May benefit from low rate dextrose IVF while feeds are off if BG continues to be low. Will defer rate to primary team based on his HF.  NPH will likely need to resume if enteral feeds resume.

## 2023-03-17 NOTE — PROGRESS NOTES
CRITICAL CARE NOTE      Name: Anival Trimble   : 1950   MRN: [id number]   Date: 3/17/2023      Reason for ICU Admission: Acute on chronic HFrEF     ICU PROBLEM LIST   Acute on chronic HFrEF (20%) NYHA IIIb/IV (D) on home inotropic support, life vest s/p LVAD  TANNER on CKD3, on CRRT  CHB post op, resolved  Aflutter w/ RVR  Acute on chronic hypoxemic respiratory failure, s/p trach placement  CAP  Gastric neuroendocrine tumor  Hx of LUE DVT  DM  PAD  TRISTAN  BPH w/ urinary retention requiring chronic donnelly    HISTORY OF PRESENT ILLNESS:   In brief, A 71year old male PMH as noted above admitted to [facility] on  due to ongoing symptoms secondary to his HFrEF. Treated for possible CAP, and diuresed for acute on chronic HFrEF. Nephrology following for TANNER on CKD 3. AHF team recommended transfer to CVICU for [facility] placement and ongoing LVAD workup, with placement 2/15. Pt extubated , however with development of worsening renal dysfunction overnight and unable to tolerate CRRT so was reintubated and taken for Impella RP placement for right-sided support in a.m. of  and CRRT resumed. Impella removed on 3/1. Overnight 3/1-3/1 CVA noted with SAH.  3/2 SAH enlarged. Held anticoagulation. Failed extubation 3/2. Trach placed 3/7. Transitioned to [facility]. Repeat CTH 3/10 w/ persistent SAH, heparin held. 24 HOUR EVENTS:   No acute events overnight  CT scan today  ASSESSMENT AND PLANS:   In brief, A 71year old male PMH as noted above admitted to [facility] on  due to ongoing symptoms secondary to his HFrEF with SAH, s/p trach, dialysis.       NEUROLOGICAL: Acute encephalopathy, critical illness polyneuropathy   -Continue Keppra  -Delirium precautions      PULMONOLOGY: Acute proximal respiratory failure, status post tracheostomy 2023   -Continue to monitor oxygen saturations  -Continue mechanical ventilation  -Continue wean ventilator as tolerated  -Daily SBT  -Pulmonary hygiene      CARDIOVASCULAR: Ischemic and nonischemic cardiomyopathy, left ventricular ejection fraction 25 to 30%, status post LVAD 15 February 2023, status post Impella right side removed 1 March 2023, cardiorenal syndrome   -Continue monitor hemodynamic status  -Continue aspirin  -Continue vasopressors  -Wean as tolerated      GASTROINTESTINAL: Hepatic congestion, hyperbilirubinemia, possible cirrhosis, pancreatitis   -Continue enteral feeds  -Continue GI prophylaxis  -Abdominal ultrasound reviewed  -Elevated lipase  -Abdominal CT to evaluate pancreas today  -Repeat abdominal ultrasound completed  -Gastrointestinal consultation     -note and recommendations reviewed    RENAL/ELECTROLYTE/FLUIDS: Acute on chronic renal failure   -Continue hemodialysis with continuous renal replacement therapy  -Strict I's and O's  -Place electrolytes as indicated  -Nephrology following    -CRRT    ENDOCRINE:   -Continue to monitor blood glucose levels   -Maintain blood glucose levels between 140-180   -Sliding scale insulin   HEMATOLOGY/ONCOLOGY: Acute on chronic anemia, gastric neuroendocrine tumor   -Hbg 7.9  -Plt 252  ID/MICRO:   -WBC 7.7  -No cultures since 9 March 2023  ICU DAILY CHECKLIST   Code Status:Full  DVT Prophylaxis: SCDs  SUP: PPI  Diet: TF  Activity Level:ad jose f  ABCDEF Bundle/Checklist Completed:Yes  Disposition: Stay in ICU  Multidisciplinary Rounds Completed:  yes  Patient/Family Updated: Yes    Family meeting today to discuss longitudinal plan and most likely prognosis.       [facility]   2/3 Transferred to CVICU for [facility] placement  2/7 started on dobutamine as adjunct to milrinone  2/15 LVAD implant  2/16 extubated  2/18 Impella RP placement  3/1 Impella removed  3/1-2 SAH on CT  3/3 RE-intubated  3/6 Blakes removed, transitioned to HD  3/7 Trach placment  3/10 persistent SAH, heparin stopped  3/11 HD catheter replaced    SUBJECTIVE:     As noted above    Review of Systems:     Unable to perform due to patient trached and mental status    OBJECTIVE:     Labs and Data: Reviewed 23  Medications: Reviewed 23  Imaging: Reviewed 23    General - unresponsive, chronically ill appearing  HEENT- pupils equal, no nystagmus, jaundiced sclerea  Neuro - unresponsive  CV - Paced,  post sternotomy  Resp - trached, good airflow bilateral without wheezing or rales. Decreased at bases  Abd - soft, not distended, nontender  MSK- intact, no sacral ulcer, anasarca  SKIN: juandiced    Visit Vitals  /82   Pulse 79   Temp 98.6 °F (37 °C)   Resp 20   Ht 5' 6\" (1.676 m)   Wt 61.7 kg (136 lb 0.4 oz)   SpO2 100%   BMI 21.95 kg/m²    O2 Flow Rate (L/min): 20 l/min O2 Device: Tracheostomy, Ventilator Temp (24hrs), Av.1 °F (36.7 °C), Min:96 °F (35.6 °C), Max:99.7 °F (37.6 °C)    CVP (mmHg): 11 mmHg (23 1500)      Intake/Output:     Intake/Output Summary (Last 24 hours) at 3/17/2023 1543  Last data filed at 3/17/2023 1500  Gross per 24 hour   Intake 768.37 ml   Output 673 ml   Net 95.37 ml     Imaging  All images and labs were reviewed by me personally. 23    ECHO ADULT FOLLOW-UP OR LIMITED 2023    Interpretation Summary    Left Ventricle: EF by visual approximation is 20%. Left ventricle is mildly dilated. Mitral Valve: Moderately thickened leaflet, at the anterior and posterior leaflets. Moderately calcified leaflet. Moderate regurgitation. Left Atrium: Left atrium is moderately dilated. Technical qualifiers: Echo study was technically difficult with poor endocardial visualization. Contrast used: Definity. Limited study, not all structures viewed    Signed by: Shyann Singh MD on 2023  4:39 PM       Pertinent imaging reviewed and interpreted as noted above    CRITICAL CARE DOCUMENTATION  I had a face to face encounter with the patient, reviewed and interpreted patient data including clinical events, labs, images, vital signs, I/O's, and examined patient.   I have discussed the case and the plan and management of the patient's care with the consulting services, the bedside nurses and the respiratory therapist.      NOTE OF PERSONAL INVOLVEMENT IN CARE   This patient has a high probability of imminent, clinically significant deterioration, which requires the highest level of preparedness to intervene urgently. I participated in the decision-making and personally managed or directed the management of the following life and organ supporting interventions that required my frequent assessment to treat or prevent imminent deterioration. I personally spent 50 minutes of critical care time. This is time spent at this critically ill patient's bedside actively involved in patient care as well as the coordination of care. This does not include any procedural time which has been billed separately.     Phillip Summers MD  Critical Care Medicine  [facility] Critical [facility]

## 2023-03-17 NOTE — PROGRESS NOTES
Problem: Self Care Deficits Care Plan (Adult)  Goal: *Acute Goals and Plan of Care (Insert Text)  Description:   FUNCTIONAL STATUS PRIOR TO ADMISSION: Patient required minimal assistance for basic and instrumental ADLs. Used rollator for functional mobility, had HHA and HH OT/PT upon discharge. HOME SUPPORT: The patient lived with wife and family members. Occupational Therapy Goals  Initiated 1/30/2023; Goals remain the same, LVAD scheduled for 2/15  1. Patient will perform grooming with supervision/set-up within 7 day(s). 2.  Patient will perform upper body dressing with supervision/set-up within 7 day(s). 3.  Patient will perform lower body dressing with supervision/set-up within 7 day(s). 4.  Patient will perform all aspects of toileting with supervision/set-up within 7 day(s). 5.  Patient will utilize energy conservation techniques during functional activities with verbal cues within 7 day(s). Occupational Therapy Goals  Initiated 3/13/2023  1. Patient will perform ADLs standing 5 mins without fatigue or LOB with maximal assistance  within 7 day(s). 2.  Patient will perform lower body ADLs with maximal assistance  within 7 day(s). 3.  Patient will perform upper body ADLs with maximal assistance within 7 day(s). 4.  Patient will perform toilet transfers with maximal assistance within 7 day(s). 5.  Patient will perform all aspects of toileting with maximal assistance within 7 day(s). 6.  Patient will participate in cardiac/sternal upper extremity therapeutic exercise/activities to increase independence with ADLs with maximal assistance for 5 minutes within 7 day(s). 7.  Patient will perform VAD switchover routine as part of ADL routine (including batteries<>batteries, battery clip<>battery clip, mock SC<>SC) with maximal assistance within 7 days. 8. Patient will participate in [address] [employer] and/or [facility] when appropriate within 7 days.          Outcome: Progressing Towards Goal   OCCUPATIONAL THERAPY TREATMENT  Patient: Hanna Ingram (76 y.o. male)  Date: 3/17/2023  Diagnosis: CHF (congestive heart failure) (HCC) [I50.9] <principal problem not specified>  Procedure(s) (LRB):  LEFT GROIN RP IMPELLA INSERTION, KIARRA BY DR Kayla Cole (Left) 27 Days Post-Op  Precautions: Fall (mindful movement)  Chart, occupational therapy assessment, plan of care, and goals were reviewed. ASSESSMENT  Patient continues with skilled OT services and is not progressing towards goals. Patient received on CRRT and continued vent support, cleared for activity by RN. Pt with improved alertness on this date, able to intermittently maintain eyes open and track to R and L visual fields 1x during session. Able to achieve wave with RUE with appropriate insight/reaction, however unable to replicate with verbal cues. Patient continues to demonstrate questionable clonus vs tremors in RUE, flaccidity noted in LUE/LLE with moderate edema present. Patient pointing/patting abdomen with RUE, endorsing pain. RN providing IV pain meds during session. Repositioned in R sidelying, spouse and RN at bedside. Will continue to follow. Current Level of Function Impacting Discharge (ADLs): total A     Other factors to consider for discharge: LVAD, CVA, CRRT/HD         PLAN :  Patient continues to benefit from skilled intervention to address the above impairments. Continue treatment per established plan of care to address goals. Recommendation for discharge: (in order for the patient to meet his/her long term goals)  To be determined: likely will require rehab    This discharge recommendation:  Has been made in collaboration with the attending provider and/or case management    IF patient discharges home will need the following DME: TBD       SUBJECTIVE:   Patient stated ouch.     OBJECTIVE DATA SUMMARY:       Functional Mobility and Transfers for ADLs:  Bed Mobility:  Rolling:  Total assistance    ADL Intervention: Total A for ADLs    Neuromuscular Reeduction:   Able to complete PROM of BUEs and gentle cervical ROM. Able to facilitate holding L hand in R hand at midline with gentle ROM. Patient with limited RUE engagement, limited by increased lethargy. Patient able to track to R quadrant and L quadrant 1x during session. Pain:  Pt endorsing pain at abdomen     Activity Tolerance:   Poor    After treatment patient left in no apparent distress:   Supine in bed, Call bell within reach, and turned to R side via pillow support    COMMUNICATION/COLLABORATION:   The patients plan of care was discussed with: Physical therapist, Occupational therapist, and Registered nurse.      Naty Dumont OT  Time Calculation: 23 mins

## 2023-03-17 NOTE — PROGRESS NOTES
Problem: Diabetes Self-Management  Goal: *Disease process and treatment process  Description: Define diabetes and identify own type of diabetes; list 3 options for treating diabetes. Outcome: Progressing Towards Goal  Goal: *Incorporating nutritional management into lifestyle  Description: Describe effect of type, amount and timing of food on blood glucose; list 3 methods for planning meals. Outcome: Progressing Towards Goal  Goal: *Incorporating physical activity into lifestyle  Description: State effect of exercise on blood glucose levels. Outcome: Progressing Towards Goal  Goal: *Developing strategies to promote health/change behavior  Description: Define the ABC's of diabetes; identify appropriate screenings, schedule and personal plan for screenings. Outcome: Progressing Towards Goal  Goal: *Using medications safely  Description: State effect of diabetes medications on diabetes; name diabetes medication taking, action and side effects. Outcome: Progressing Towards Goal  Goal: *Monitoring blood glucose, interpreting and using results  Description: Identify recommended blood glucose targets  and personal targets. Outcome: Progressing Towards Goal  Goal: *Prevention, detection, treatment of acute complications  Description: List symptoms of hyper- and hypoglycemia; describe how to treat low blood sugar and actions for lowering  high blood glucose level. Outcome: Progressing Towards Goal  Goal: *Prevention, detection and treatment of chronic complications  Description: Define the natural course of diabetes and describe the relationship of blood glucose levels to long term complications of diabetes.   Outcome: Progressing Towards Goal  Goal: *Developing strategies to address psychosocial issues  Description: Describe feelings about living with diabetes; identify support needed and support network  Outcome: Progressing Towards Goal  Goal: *Insulin pump training  Outcome: Progressing Towards Goal  Goal: *Sick day guidelines  Outcome: Progressing Towards Goal  Goal: *Patient Specific Goal (EDIT GOAL, INSERT TEXT)  Outcome: Progressing Towards Goal     Problem: Patient Education: Go to Patient Education Activity  Goal: Patient/Family Education  Outcome: Progressing Towards Goal     Problem: Pressure Injury - Risk of  Goal: *Prevention of pressure injury  Description: Document Mike Scale and appropriate interventions in the flowsheet. Outcome: Progressing Towards Goal  Note: Pressure Injury Interventions:  Sensory Interventions: Assess changes in LOC, Monitor skin under medical devices, Minimize linen layers    Moisture Interventions: Apply protective barrier, creams and emollients, Assess need for specialty bed    Activity Interventions: Increase time out of bed, Pressure redistribution bed/mattress(bed type), PT/OT evaluation    Mobility Interventions: Pressure redistribution bed/mattress (bed type), PT/OT evaluation    Nutrition Interventions: Document food/fluid/supplement intake, Discuss nutritional consult with provider    Friction and Shear Interventions: Apply protective barrier, creams and emollients, HOB 30 degrees or less                Problem: Patient Education: Go to Patient Education Activity  Goal: Patient/Family Education  Outcome: Progressing Towards Goal     Problem: Falls - Risk of  Goal: *Absence of Falls  Description: Document Laverne Fall Risk and appropriate interventions in the flowsheet.   Outcome: Progressing Towards Goal  Note: Fall Risk Interventions:  Mobility Interventions: PT Consult for assist device competence, PT Consult for mobility concerns, OT consult for ADLs         Medication Interventions: Teach patient to arise slowly, Bed/chair exit alarm    Elimination Interventions: Call light in reach    History of Falls Interventions: Door open when patient unattended, Consult care management for discharge planning         Problem: Patient Education: Go to Patient Education Activity  Goal: Patient/Family Education  Outcome: Progressing Towards Goal     Problem: Pain  Goal: *Control of Pain  Outcome: Progressing Towards Goal  Goal: *PALLIATIVE CARE:  Alleviation of Pain  Outcome: Progressing Towards Goal     Problem: Patient Education: Go to Patient Education Activity  Goal: Patient/Family Education  Outcome: Progressing Towards Goal     Problem: Patient Education: Go to Patient Education Activity  Goal: Patient/Family Education  Outcome: Progressing Towards Goal     Problem: Patient Education: Go to Patient Education Activity  Goal: Patient/Family Education  Outcome: Progressing Towards Goal     Problem: Nutrition Deficit  Goal: *Optimize nutritional status  Outcome: Progressing Towards Goal     Problem: Ventilator Management  Goal: *Adequate oxygenation and ventilation  Outcome: Progressing Towards Goal  Goal: *Patient maintains clear airway/free of aspiration  Outcome: Progressing Towards Goal  Goal: *Absence of infection signs and symptoms  Outcome: Progressing Towards Goal  Goal: *Normal spontaneous ventilation  Outcome: Progressing Towards Goal     Problem: Patient Education: Go to Patient Education Activity  Goal: Patient/Family Education  Outcome: Progressing Towards Goal     Problem: Patient Education: Go to Patient Education Activity  Goal: Patient/Family Education  Outcome: Progressing Towards Goal     Problem: LVAD Pre-op Period  Goal: Off Pathway (Use only if patient is Off Pathway)  Outcome: Progressing Towards Goal  Goal: Activity/Safety  Outcome: Progressing Towards Goal  Goal: Consults, if ordered  Outcome: Progressing Towards Goal  Goal: Diagnostic Test/Procedures  Outcome: Progressing Towards Goal  Goal: Nutrition/Diet  Outcome: Progressing Towards Goal  Goal: Medications  Outcome: Progressing Towards Goal  Goal: Treatments/Interventions/Procedures  Outcome: Progressing Towards Goal  Goal: Discharge Planning  Outcome: Progressing Towards Goal  Goal: Psychosocial  Outcome: Progressing Towards Goal  Goal: *Hemodynamically stable (eg: Cardiac output/index; pulmonary arterial pressures; mixed venous oxygen saturation within set parameters)  Outcome: Progressing Towards Goal  Goal: *Labs/tests completed  Outcome: Progressing Towards Goal     Problem: LVAD Pre-Op Day  Goal: Off Pathway (Use only if patient is Off Pathway)  Outcome: Progressing Towards Goal  Goal: Activity/Safety  Outcome: Progressing Towards Goal  Goal: Consults, if ordered  Outcome: Progressing Towards Goal  Goal: Diagnostic Test/Procedures  Outcome: Progressing Towards Goal  Goal: Nutrition/Diet  Outcome: Progressing Towards Goal  Goal: Medications  Outcome: Progressing Towards Goal  Goal: Treatments/Interventions/Procedures  Outcome: Progressing Towards Goal  Goal: Discharge Planning  Outcome: Progressing Towards Goal  Goal: Psychosocial  Outcome: Progressing Towards Goal  Goal: *Vital signs within specified parameters  Outcome: Progressing Towards Goal  Goal: *Consent obtained, permits and diagnostics complete  Outcome: Progressing Towards Goal  Goal: *Confirmation of post discharge primary support person(s)  Outcome: Progressing Towards Goal     Problem: LVAD Day of Surgery (Initiate SCIP measure for post-op care)  Goal: Off Pathway (Use only if patient is Off Pathway)  Outcome: Progressing Towards Goal  Goal: Activity/Safety  Outcome: Progressing Towards Goal  Goal: Consults, if ordered  Outcome: Progressing Towards Goal  Goal: Diagnostic Test/Procedures  Outcome: Progressing Towards Goal  Goal: Nutrition/Diet  Outcome: Progressing Towards Goal  Goal: Medications  Outcome: Progressing Towards Goal  Goal: Respiratory  Outcome: Progressing Towards Goal  Goal: Treatments/Interventions/Procedures  Outcome: Progressing Towards Goal  Goal: Psychosocial  Outcome: Progressing Towards Goal  Goal: *Hemodynamically stable (eg: Cardiac output/index; pulmonary arterial pressures; mixed venous oxygen saturation within set parameters)  Outcome: Progressing Towards Goal  Goal: *Lungs clear or at baseline  Outcome: Progressing Towards Goal  Goal: *Stable cardiac rhythm  Outcome: Progressing Towards Goal  Goal: *Follows simple commands post anesthesia  Outcome: Progressing Towards Goal  Goal: *Optimal pain control at patient's stated goal  Outcome: Progressing Towards Goal  Goal: *LVAD parameters within set limits  Outcome: Progressing Towards Goal     Problem: Nutrition Deficit  Goal: *Optimize nutritional status  Outcome: Progressing Towards Goal

## 2023-03-17 NOTE — PROGRESS NOTES
Occupational Therapy  3/17/2023    Chart reviewed and discussed with RN. Patient on CRRT and with increased abdominal pain at this time, requesting to hold OT session at this time. Will reattempt this afternoon as appropriate/able.      MARJAN Valerio, OTR/L

## 2023-03-17 NOTE — PROGRESS NOTES
1930: Bedside and Verbal shift change report given to Merary Hines RN (oncoming nurse) by iPnky Laguna RN (offgoing nurse). Report included the following information SBAR, Intake/Output, MAR, Recent Results, and Cardiac Rhythm NSR .      2145: Pt will go for CT tomorrow; okay to keep TF off until after CT per Intensivist    0730: Bedside and Verbal shift change report given to Gely Sung RN (oncoming nurse) by Merary Hines RN (offgoing nurse). Report included the following information SBAR, Intake/Output, MAR, Recent Results, and Cardiac Rhythm NSR .

## 2023-03-17 NOTE — PROGRESS NOTES
Had family meeting with pt. Wife in CVICU. Pt. Darby Collins were present via speaker phone. Also present include  Dr. Aaliyah Gutierrez nurse  Dr. Tay Rodgers discussed current medical issues and developments including pancreatitis and labs. Noted pt. Has increased alertness this week, but that several issues are still creating significant risk of poor quality of life and/or longterm complications. Pt wife and children asked questions. RN checked that pt. Family understands pt. Current status. Discussed plan to wait a week and meet next Friday when pt. Son can be present. Palliative discussed pain management with pt. After meeting and SW provided support for pt. Wife and will plan to check in next week with her/family.

## 2023-03-17 NOTE — PROGRESS NOTES
Name: Darshana Light   MRN: [id number]  : 1950      Assessment:  TANNER on CKD-3a: Suspect cardiorenal effects initially. Now has LVAD and  POST OP ATN. Anuric->Requiring CRRT>>iHD-> back to CRRT 3/15/23. left IJ Hermelindo catheter was last replaced on 3/11/2023 by intensivist    hypercalcemia/immobilization- also has elevated PTH; may have component of 1ry. On BID dosing of Sensipar    Possible pancreatitis- with elevated lipase; Hypercalcemia can contribute  Ischemic CM: Recurrent exacerbations. EF 20%. S/p LVAD on 2/15. 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 Factor rate at 0cc/hr  Levophed  Ct Sensipar 60mg BID   Appreciate GI involvement  Holding Colchicine  BRIGHT  AM labs      Subjective:  RN at bedside. On CVVH. On Levophed. More awake.  Still extremely weak LUE    ROS:   UTO    Exam:  Visit Vitals  /82   Pulse 79   Temp 98.6 °F (37 °C)   Resp 20   Ht 5' 6\" (1.676 m)   Wt 61.7 kg (136 lb 0.4 oz)   SpO2 100%   BMI 21.95 kg/m²     NAD/Frail  NGT  +icterus  +trach  Abd distension  Tr edema-improved  + Jaundice    Current Facility-Administered Medications   Medication Dose Route Frequency Last Admin    dextrose (D50W) injection syrg 25 g  25 g IntraVENous PRN 25 mL at 23 1042    insulin lispro (HUMALOG) injection   SubCUTAneous Q4H      bicarbonate dialysis (PRISMASOL) BG K 4/Ca 2.5 5000 ml solution   Extracorporeal DIALYSIS CONTINUOUS 1,600 mL/hr at 23 1523    levETIRAcetam (KEPPRA) injection 500 mg  500 mg IntraVENous Q12H 500 mg at 23 0841    meropenem (MERREM) 1 g in 0.9% sodium chloride (MBP/ADV) 50 mL MBP 1 g IntraVENous Q12H 1 g at 03/17/23 0557    cinacalcet (SENSIPAR) tablet 60 mg  60 mg Oral BID WITH MEALS 60 mg at 03/17/23 0841    [Held by provider] insulin NPH (NOVOLIN N, HUMULIN N) injection 10 Units  10 Units SubCUTAneous Q12H 10 Units at 03/15/23 2112    albumin human 25% (BUMINATE) solution 12.5 g  12.5 g IntraVENous Q12H 12.5 g at 03/17/23 0841    oxyCODONE IR (ROXICODONE) tablet 5 mg  5 mg Oral Q4H PRN 5 mg at 03/17/23 0841    oxyCODONE IR (ROXICODONE) tablet 10 mg  10 mg Oral Q4H PRN 10 mg at 03/16/23 1857    pantoprazole (PROTONIX) 40 mg in 0.9% sodium chloride 10 mL injection  40 mg IntraVENous DAILY 40 mg at 03/17/23 0841    labetaloL (NORMODYNE;TRANDATE) injection 5 mg  5 mg IntraVENous Q4H PRN 5 mg at 03/09/23 0307    [Held by provider] heparin 25,000 units in D5W 250 ml infusion  400 Units/hr IntraVENous CONTINUOUS Stopped at 03/10/23 1350    0.9% sodium chloride infusion  14 mL/hr IntraVENous CONTINUOUS 14 mL/hr at 03/09/23 0757    albumin human 25% (BUMINATE) solution 25 g  25 g IntraVENous DIALYSIS PRN 25 g at 03/13/23 0539    hydrALAZINE (APRESOLINE) 20 mg/mL injection 5 mg  5 mg IntraVENous Q6H PRN 5 mg at 03/10/23 1316    hydrALAZINE (APRESOLINE) tablet 5 mg  5 mg Oral PRN      aspirin chewable tablet 81 mg  81 mg Per NG tube DAILY 81 mg at 03/17/23 0842    epoetin heidy-epbx (RETACRIT) injection 10,000 Units  10,000 Units SubCUTAneous Q TUE, THU & SAT 10,000 Units at 03/16/23 2135    EPINEPHrine (ADRENALIN) 10 mg in 0.9% sodium chloride 250 mL infusion  0-10 mcg/min IntraVENous TITRATE Stopped at 03/05/23 0920    NOREPINephrine (LEVOPHED) 8 mg in 5% dextrose 250mL (32 mcg/mL) infusion  0.5-16 mcg/min IntraVENous TITRATE 7 mcg/min at 03/17/23 1030    [Held by provider] colchicine tablet 0.6 mg  0.6 mg Oral DAILY 0.6 mg at 03/14/23 0848    heparin (porcine) 1,000 unit/mL injection 1,700 Units  1,700 Units InterCATHeter DIALYSIS PRN 1,700 Units at 03/13/23 0819    And    heparin (porcine) 1,000 unit/mL injection 1,500 Units  1,500 Units InterCATHeter DIALYSIS PRN 1,500 Units at 03/13/23 0819    balsam peru-castor oiL (VENELEX) ointment   Topical BID Given at 03/17/23 0906    [Held by provider] acetaminophen (TYLENOL) solution 650 mg  650 mg Oral Q4H  mg at 03/14/23 1924    [Held by provider] acetaminophen (TYLENOL) suppository 650 mg  650 mg Rectal Q4H  mg at 02/17/23 2013    HYDROmorphone (DILAUDID) injection 0.5 mg  0.5 mg IntraVENous Q3H PRN 0.5 mg at 03/17/23 1510    HYDROmorphone (DILAUDID) injection 1 mg  1 mg IntraVENous Q4H PRN 1 mg at 03/14/23 0848    [Held by provider] heparin 25,000 units in D5W 250 ml infusion  12-25 Units/kg/hr IntraVENous TITRATE Stopped at 03/08/23 0515    glucose chewable tablet 16 g  4 Tablet Oral PRN      glucagon (GLUCAGEN) injection 1 mg  1 mg IntraMUSCular PRN      sodium chloride (NS) flush 5-40 mL  5-40 mL IntraVENous Q8H 10 mL at 03/17/23 1502    sodium chloride (NS) flush 5-40 mL  5-40 mL IntraVENous PRN 40 mL at 02/17/23 1818    naloxone (NARCAN) injection 0.4 mg  0.4 mg IntraVENous PRN      ELECTROLYTE REPLACEMENT NOTE: Nurse to review Serum Potassium and Magnesuim levels and Initiate Electrolyte Replacement Protocol as needed  1 Each Other PRN      dextrose 10 % infusion 0-250 mL  0-250 mL IntraVENous PRN 75 mL at 02/24/23 0220    [Held by provider] acetaminophen (TYLENOL) tablet 650 mg  650 mg Oral Q6H  mg at 03/13/23 2114    ondansetron (ZOFRAN) injection 4 mg  4 mg IntraVENous Q4H PRN 4 mg at 03/09/23 1212    albuterol-ipratropium (DUO-NEB) 2.5 MG-0.5 MG/3 ML  3 mL Nebulization Q6H PRN      bisacodyL (DULCOLAX) suppository 10 mg  10 mg Rectal DAILY PRN 10 mg at 02/22/23 0839    0.45% sodium chloride infusion  10 mL/hr IntraVENous CONTINUOUS Stopped at 03/10/23 1707    dexmedeTOMidine in 0.9 % NaCl (PRECEDEX) 400 mcg/100 mL (4 mcg/mL) infusion soln  0.1-1.5 mcg/kg/hr IntraVENous TITRATE Stopped at 03/08/23 2300       Labs/Data:    Lab Results   Component Value Date/Time    WBC 7.7 03/17/2023 02:10 AM    HGB 7.9 (L) 03/17/2023 02:10 AM    HCT 23.9 (L) 03/17/2023 02:10 AM    PLATELET 229 15/73/0221 02:10 AM    .0 (H) 03/17/2023 02:10 AM       Lab Results   Component Value Date/Time    Sodium 134 (L) 03/17/2023 02:10 AM    Potassium 4.2 03/17/2023 02:10 AM    Chloride 102 03/17/2023 02:10 AM    CO2 25 03/17/2023 02:10 AM    Anion gap 7 03/17/2023 02:10 AM    Glucose 83 03/17/2023 02:10 AM    BUN 40 (H) 03/17/2023 02:10 AM    Creatinine 2.23 (H) 03/17/2023 02:10 AM    BUN/Creatinine ratio 18 03/17/2023 02:10 AM    GFR est AA 46 (L) 06/23/2022 09:40 AM    GFR est non-AA 38 (L) 06/23/2022 09:40 AM    eGFR 31 (L) 03/17/2023 02:10 AM    eGFR 69 01/25/2023 11:55 AM    Calcium 10.9 (H) 03/17/2023 02:10 AM       Wt Readings from Last 3 Encounters:   03/17/23 61.7 kg (136 lb 0.4 oz)   01/25/23 55.8 kg (123 lb)   01/23/23 54.9 kg (121 lb)         Intake/Output Summary (Last 24 hours) at 3/17/2023 1653  Last data filed at 3/17/2023 1500  Gross per 24 hour   Intake 749.27 ml   Output 653.1 ml   Net 96.17 ml         Patient seen and examined. Chart reviewed. Labs, data and other pertinent notes reviewed in last 24 hrs.     PMH/SH/FH reviewed and unchanged compared to H&P    Discussed case with CVICU RN and patient's son      Cole Nugent MD  [id number] [employer]

## 2023-03-17 NOTE — DIALYSIS
CRRT / [phone]    Orders   Mode: CVVH rounding   Blood Flow Rate: 200 ml/min   Prismasol Dose: PBP: 800 ml/hr  Replacement 1: 400 ml/hr  Replacement 2: 400 ml/hr   Prismasol Concentrate: 4K / 2.5Ca   Blood Warmer Temp: 37*c   Net Fluid Removal: 50 ml/hr      Metrics   BP: 116/65   HR: 91   Access Pressure: -61   Filter Pressure: 175   Return Pressure: 31   TMP: 97   Pressure Drop: 74     Access   Type & Location: LIJ non-tunneled CVC C/D/I with transparent dressing and biopatch in place dated 03/14/23. Labs   HBsAg (Antigen) / date: Negative  03/13/23                                              HBsAb (Antibody) / date: Susceptible  03/13/23   Source: Fungos     Safety:   Time Out Done:   (Time) 6186   Consent obtained/signed: Verified   Education: Access/Site Care   Primary Nurse Rpt PreClaybon Masha RN   Primary Nurse Rpt PostClaybon LORI Flanagan     Comments / Plan:   Patient, orders, line and consent verified. Labs, notes and code status reviewed. [id number] filter running well with no indications for change at this time. Lines visible/secure with blood warmer attached to the return line and set to 37C*. Education and Pre/Post with primary RN.

## 2023-03-17 NOTE — PROGRESS NOTES
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 Vivian James  C/b RV failure requiring Impella RP placed 23 and discontinued 3/1/23  C/b acute on chronic renal failure, requiring CRRT  C/b hepatic failure, TB 12 (peak 15.3)  C/b lactic acidosis, improved  C/b persistent septic shock c/b vasodilation and high outputs, on multiple antibiotics, procalcitonin persistently elevated  C/b R SUPA occlusion with small area of matched perfusion defect - subacute infarct c/b left sided hemiparesis  C/b pancreatitis, persistent  C/b failure to extubate x 2 s/p tracheostomy  Patient was approved for LVAD implantation as destination therapy at [facility] 2/3/23; the following have been identified and d/w patient as relative concerns pertaining to LVAD candidacy: small body surface area, cachexia, BMI 18, malnutrition, frailty, advanced age, muscular deconditioning, PVD (fingertips), urinary retention requiring donnelly catheter, neuroendocrine tumor class 1 requiring treatment after LVAD, mild neurocognitive dysfunction, chronic kidney disease and hearing loss requiring hearing aid  Family meeting today Dr. Lionel Guevara, Dr. Manny Anand, bedside RN, palliative care and SW  Patient remains critically ill, improved RV and respiratory status; persistent liver failure and pancreatitis complicating picture; if no improvement or worsening over the next week then will discuss timing of withdrawal of support; if catastrophic even between now and next week, family wishes to discontinue LVAD support  Family meeting scheduled for Friday afternoon  D/w LVAD team and Dr. Jose M Burrell at [facility]:  Continue LVAD speed at 4700rpm  Use hydralazine 5mg IV q4h prn MAP>85 or SBP > 110 mmHg  No beta-blockers due to hypotension and RV conditioning prior to LVAD  Cannot tolerate ARB/ARNi due to hypotension and upcoming surgical procedure   Cannot tolerate spironolactone due to hyperkalemia  Cannot tolerate jardiance due to significant diuresis on smallest dose/contributed to IVVD  Previously discontinued corlanor due to SVT  Digoxin stopped d/t risk for toxicity with amio loading  Discontinued amio due to intermitted heart blocks under pacemaker  HD per nephrology switch to CRRT to keep consistently goal CVP 8-10  Keep K+ >4 and Mg >2  ID recommendations appreciated - pan culture NGTD  No colchicine per nephrology  Antibiotics per intensivist  GI consult appreciated  Hold ASA d/t CVA   Continue to hold coumadin  Heparin gtt on hold for now due to slightly progressive on SAH on head CT  D/w intensivist and family, likely more harm from resuming than continuing  Hepatology recommendations appreciated   Cannot tolerate statins due to abnormal LFT  Management of tube feeds in light of pancreatitis per intensivist  Consider GI consultation  Continue bowel regimen   Daily dressing changes to Drive line exit site  Pulmonary hygiene- continue SBT   VAD education with caregivers/patient when able by VAD coordinator  D/w  bedside staff      INTERVAL HISTORY:  Low grade temp  MAPs 80s, [id number]  CVP 11  I/O net positive 68cc, dialysis 640cc  Hgb down to 8.3  INR 1.6  Ca down to 10.9  TB 24.3 from 22.6 from 20.7 from 20.6 from 19.4  LFTs unchanged  Lipase > 3000  Lactic 1.9  ESR 67 from 56  Procalcitonin 4.47   Lactic 1.1       IMPRESSION:  Acute on chronic combined systolic/diastolic  heart failure  Stage D, NYHA class IV symptoms   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, LVEF 20% (by echo 1/2023) and 23% (by cMRI 1/3/23)  Cardiac MRI suggestive of ischemic cardiomyopathy  PYP equivocal  S/p HeartMate 3  LVAD-DT (2/15/23)  C/b RV failure requiring Impella RP placed 2/18/23 and discontinued 3/1/23  C/b acute on chronic renal failure, requiring CRRT  C/b hepatic failure, TB 12 (peak 15.3)  C/b lactic acidosis, persistent  C/b persistent septic shock c/b vasodilation and high outputs, on multiple antibiotics, procalcitonin persistently elevated  C/b R SUPA occlusion with small area of matched perfusion defect - subacute infarct c/b left sided hemiparesis  C/b pancreatitis  C/b failure to extubate x 2; anticipating tracheostomy this week  Coronary artery disease  CAD s/p CABG x 2: further disease best managed medically due to small vessel size   At risk of sudden cardiac death  Peripheral arterial disease  Bilateral hydronephrosis s/p donnelly  Cardiac risk factors:  HTN  HL  TRISTAN, STOP-BANG 4  DM2  CKD, stage 3  MOCA from 2/9 19/30, consistent with mild cognitive impairment  Hard of hearing  Gastric Neuroendocrine Tumor, elevated gastrin and chromogranin A  Septic shock, improving   Left sided weakness noted night 3/1. +SAH and subacute occlusion distal R cerebral artery         LIFE GOALS: not readdressed today   Patient's personal goals included: having a few more years with family  Important upcoming milestones or family events: None at this time   The patient identified the following as important for living well: being at home, not being SOB            CXR (3/5/23) mild pulmonary edema. Small pleural effusions and bibasilar airspace opacities. Head CTA (3/2/23) Occlusion distal right anterior cerebral artery, with associated small area of matched perfusion defect and cortical enhancement, consistent with subacute infarct. Diminutive and irregular right vertebral artery, occluded at the V3-4 junction, age indeterminate extensive multifocal atherosclerosis in the intracranial and extracranial circulation.         CARDIAC IMAGING:   Echo (3/13/23)    Left Ventricle: Severely reduced left ventricular systolic function with a visually estimated EF of 25 - 30%. Left ventricle is dilated. Normal wall thickness. Unable to assess wall motion. Right Ventricle: Moderately reduced systolic function. TAPSE is abnormal. TAPSE is 1.1 cm. LVEDD 5.3cm     ECHO- 3/6/23       Left Ventricle: Severely reduced left ventricular systolic function with a visually estimated EF of 15 - 20%. Left ventricle is moderately dilated. LVAD is present. Right Ventricle: Right ventricle is moderately dilated. Mildly reduced systolic function. Echo 2/19/23    Left Ventricle: Severely reduced left ventricular systolic function with a visually estimated EF of 20 - 25%. Not well visualized. Left ventricle size is normal. Increased wall thickness. Unable to assess wall motion. Abnormal diastolic function. LVAD is present. LVAD inflow cannula was not well visualized. Right Ventricle: Not well visualized. Right ventricle is mildly dilated. Moderately reduced systolic function. TAPSE is abnormal.    Mitral Valve: Severe annular calcification at the anterior and posterior leaflets of the mitral valve. Mild regurgitation. Left Atrium: Left atrium is dilated. Right Atrium: Right atrium is dilated. Technical qualifiers: Echo study was technically difficult and technically difficult with poor endocardial visualization. Echo 1/27/23    Left Ventricle: EF by visual approximation is 20%. Left ventricle is mildly dilated. Mitral Valve: Moderately thickened leaflet, at the anterior and posterior leaflets. Moderately calcified leaflet. Moderate regurgitation. Left Atrium: Left atrium is moderately dilated. Technical qualifiers: Echo study was technically difficult with poor endocardial visualization. Contrast used: Definity.     Limited study, not all structures viewed     Echo 1/9/23   Left Ventricle: Severely reduced left ventricular systolic function with a visually estimated EF of 25 - 30%. Left ventricle size is normal. Mildly increased wall thickness. Echo 12/26/22    Left Ventricle: Severely reduced left ventricular systolic function with a visually estimated EF of 25 - 30%. Left ventricle size is normal. Normal wall thickness. There are regional wall motion abnormalities. Grade II diastolic dysfunction with increased LAP. Right Ventricle: Moderately reduced systolic function. TAPSE is abnormal. TAPSE is 1.1 cm. Aortic Valve: Mild stenosis of the aortic valve. AV peak gradient is 13 mmHg. AV peak velocity is 1.8 m/s. Mitral Valve: Not well visualized. Moderate annular calcification at the posterior leaflet of the mitral valve. Mild to moderate regurgitation. Tricuspid Valve: Mildly elevated RVSP. Left Atrium: Left atrium is moderately dilated. 12/8/22    Left Ventricle: Moderately reduced left ventricular systolic function with a visually estimated EF of 35 - 40%. Severe hypokinesis of the following segments: mid anteroseptal, apical anterior, apical septal, apical inferior and apical lateral. Severe hypokinesis of the apex. Mitral Valve: Severely thickened leaflet, at the anterior and posterior leaflets. Severely calcified leaflet, at the anterior and posterior leaflets. Mild annular calcification of the mitral valve. Moderate regurgitation. Left Atrium: Left atrium is mildly dilated. Contrast used: Definity. limited study     EKG 12/22/22 ST, Biatria enlargement, marked ST abnormality     C 12/6/22  1. Normal LVEDP  2. Severe native multivessel coronary artery disease  3. Patent LIMA to LAD and vein graft to distal RCA  4. Recurrent ISR in OM1 stent with now 60 to 70% restenosis  5. Recoil of left main and circumflex stent with now recurrent 40 to 50% stenosis. 6.  Progression of ostial left main disease now to about 60% stenosis  7. Progression of disease in jailed first marginal branch now with diffuse 90% stenosis  8.   High-grade stenosis in the mid to distal right potential femoral artery treated with 6 x 40 mm impact drug-coated balloon angioplasty to reduce the stenosis to less than 40%        HEMODYNAMICS:  RHC 1/9/23  PA 20/9, RA 3, PCWP 8, CI 1.8     CPEST too ill   6MW 300 feet   Physical Assessment:   Physical Exam  Vitals and nursing note reviewed. Constitutional:       Appearance: He is ill-appearing, trach on vent   Cardiovascular:      Rate and Rhythm: Regular rhythm. Tachycardia present. Heart sounds: Normal heart sounds. No murmur heard. Comments: + VAD hum  Pulmonary:      Breath sounds: Rhonchi present. Comments: intubated  Abdominal:      General: There is distension. Palpations: Abdomen is soft. Musculoskeletal:         General: No swelling. Skin:     General: Skin is warm and dry. Coloration: Skin is jaundiced. Neurological:      Mental Status: He is alert.       Comments: sedated         REVIEW OF SYSTEMS:  Review of Systems   Unable to perform ROS: Acuity of condition    LVAD INTERROGATION:  Device interrogated in person  Device function normal, normal flow, no events  LVAD   Pump Speed (RPM): 4700  Pump Flow (LPM): 3.6  MAP: 63  PI (Pulsitility Index): 3.6  Power: 3   Test: Yes  Back Up  at Bedside & Labeled: Yes  Power Module Test: Yes  Driveline Site Care: No  Driveline Dressing: Clean  Testing  Alarms Reviewed: Yes  Back up SC speed: 4700  Back up Low Speed Limit: 4300  Emergency Equipment with Patient?: Yes  Emergency procedures reviewed?: No  Drive line site inspected?: Yes  Drive line intergrity inspected?: Yes  Drive line dressing changed?: No    PHYSICAL EXAM:  Visit Vitals  /82   Pulse 89   Temp (!) 96 °F (35.6 °C)   Resp 20   Ht 5' 6\" (1.676 m)   Wt 136 lb 0.4 oz (61.7 kg)   SpO2 100%   BMI 21.95 kg/m²     PAST MEDICAL HISTORY:  Past Medical History:   Diagnosis Date    CAD (coronary artery disease) 11/10/2016    NSTEMI & 2 stents    Deafness 10/28/2012 DM [facility]di[facility] mellitus[facility]     Elevated cholesterol     Hypertension     NSTEMI (non-ST elevated myocardial infarction) ([facility] 11/10/2016       PAST SURGICAL HISTORY:  Past Surgical History:   Procedure Laterality Date    COLONOSCOPY N/A 6/28/2018    COLONOSCOPY performed by Radha Smalls MD at [facility] ENDOSCOPY    COLONOSCOPY N/A 1/18/2023    COLONOSCOPY performed by Marlen Garcia MD at [facility] ENDOSCOPY    [address]  11/11/2016    2 stents       FAMILY HISTORY:  Family History   Problem Relation Age of Onset    Heart Disease Father     Heart Attack Father     Hypertension Mother     Elevated Lipids Brother     Elevated Lipids Brother     No Known Problems Sister     Elevated Lipids Brother     No Known Problems Son     No Known Problems Daughter     Anesth Problems Neg Hx        SOCIAL HISTORY:  Social History     Socioeconomic History    Marital status:    Tobacco Use    Smoking status: Never     Passive exposure: Never    Smokeless tobacco: Never   Vaping Use    Vaping Use: Never used   Substance and Sexual Activity    Alcohol use: Yes     Alcohol/week: 2.0 standard drinks     Types: 1 Cans of beer, 1 Shots of liquor per week     Comment: rarely    Drug use: No    Sexual activity: Yes     Social Determinants of Health     Financial Resource Strain: Medium Risk    Difficulty of Paying Living Expenses: Somewhat hard   Food Insecurity: Food Insecurity Present    Worried About Running Out of Food in the Last Year: Never true    Ran Out of Food in the Last Year: Often true       LABORATORY RESULTS:     Labs Latest Ref Rng & Units 3/17/2023 3/16/2023 3/15/2023 3/15/2023 3/14/2023 3/13/2023 3/12/2023   WBC 4.1 - 11.1 K/uL 7.7 7.7 - 7.6 8.0 8.1 7.1   RBC 4.10 - 5.70 M/uL 2.39(L) 2.37(L) - 2.61(L) 2.60(L) 2.64(L) 2.50(L)   Hemoglobin 12.1 - 17.0 g/dL 7. 9(L) 7. 7(L) - 8. 3(L) 8. 3(L) 8.4(L) 8. 1(L)   Hematocrit 36.6 - 50.3 % 23. 9(L) 23. 3(L) - 26. 2(L) 25. 7(L) 26. 4(L) 25. 2(L) MCV 80.0 - 99.0 . 0(H) 98.3 - 100. 4(H) 98.8 100. 0(H) 100. 8(H)   Platelets 184 - 636 K/uL 252 234 - 271 233 260 239   Lymphocytes 12 - 49 % 10(L) 9(L) - 10(L) 9(L) 8(L) 8(L)   Monocytes 5 - 13 % 15(H) 16(H) - 13 14(H) 14(H) 13   Eosinophils 0 - 7 % 4 6 - 7 8(H) 3 3   Basophils 0 - 1 % 1 1 - 1 1 0 0   Albumin 3.5 - 5.0 g/dL 3.5 3. 4(L) - 3.6 3.5 3.5 4.0   Calcium 8.5 - 10.1 MG/DL 10. 9(H) 10. 6(H) 11. 7(H) 11. 8(H) 11. 9(H) 12. 1(H) 10. 6(H)   Glucose 65 - 100 mg/dL 83 162(H) - 102(H) 197(H) 205(H) 200(H)   BUN 6 - 20 MG/DL 40(H) 65(H) - 87(H) 62(H) 73(H) 52(H)   Creatinine 0.70 - 1.30 MG/DL 2.23(H) 3.26(H) - 4.53(H) 3.36(H) 4.28(H) 3.17(H)   Sodium 136 - 145 mmol/L 134(L) 134(L) - 136 137 138 138   Potassium 3.5 - 5.1 mmol/L 4.2 4.2 - 3.9 3.5 3.9 3.8   TSH 0.36 - 3.74 uIU/mL 1.25 - - - - - -   PSA 0.01 - 4.0 ng/mL - - - - - - -   LDH 85 - 241 U/L 281(H) 289(H) - 282(H) 259(H) 256(H) 272(H)   Some recent data might be hidden     Lab Results   Component Value Date/Time    TSH 1.25 03/17/2023 02:10 AM    TSH 3.52 01/28/2023 05:26 AM    TSH 2.12 12/27/2022 02:36 PM    TSH 4.80 (H) 12/06/2022 03:53 AM    TSH 5.39 (H) 10/12/2022 09:10 AM    TSH 3.53 02/03/2022 11:47 AM    TSH 5.790 (H) 11/21/2019 04:45 PM    TSH 3.08 06/22/2018 01:53 PM    TSH 4.250 05/26/2015 09:43 AM       ALLERGY:  Allergies   Allergen Reactions    Ambien [Zolpidem] Other (comments)     Causes extreme confusion        CURRENT MEDICATIONS:    Current Facility-Administered Medications:     dextrose (D50W) injection syrg 25 g, 25 g, IntraVENous, PRN, Vimal Mcfarlane MD, 25 mL at 03/17/23 1042    bicarbonate dialysis (PRISMASOL) BG K 4/Ca 2.5 5000 ml solution, , Extracorporeal, DIALYSIS CONTINUOUS, Jadiel Galvan MD, Last Rate: 1,600 mL/hr at 03/17/23 0900, 1,600 mL/hr at 03/17/23 0900    levETIRAcetam (KEPPRA) injection 500 mg, 500 mg, IntraVENous, Q12H, Vimal Mcfarlane MD, 500 mg at 03/17/23 0841    [COMPLETED] meropenem (MERREM) 1 g in 0.9% sodium chloride 20 mL IV syringe, 1 g, IntraVENous, NOW, 1 g at 03/15/23 1734 **FOLLOWED BY** meropenem (MERREM) 1 g in 0.9% sodium chloride (MBP/ADV) 50 mL MBP, 1 g, IntraVENous, Q12H, Yair Rizo MD, Last Rate: 16.7 mL/hr at 03/17/23 0557, 1 g at 03/17/23 0557    cinacalcet (SENSIPAR) tablet 60 mg, 60 mg, Oral, BID WITH MEALS, Jadiel Galvan MD, 60 mg at 03/17/23 0841    [Held by provider] insulin NPH (NOVOLIN N, HUMULIN N) injection 10 Units, 10 Units, SubCUTAneous, Q12H, Cathy Sheldon CNS, 10 Units at 03/15/23 2112    albumin human 25% (BUMINATE) solution 12.5 g, 12.5 g, IntraVENous, Q12H, Lizette Linton NP, 12.5 g at 03/17/23 0841    oxyCODONE IR (ROXICODONE) tablet 5 mg, 5 mg, Oral, Q4H PRN, Derek Olive G, DO, 5 mg at 03/17/23 0841    oxyCODONE IR (ROXICODONE) tablet 10 mg, 10 mg, Oral, Q4H PRN, Armando Balboa, DO, 10 mg at 03/16/23 1857    pantoprazole (PROTONIX) 40 mg in 0.9% sodium chloride 10 mL injection, 40 mg, IntraVENous, DAILY, Walker Phillips MD, 40 mg at 03/17/23 0841    labetaloL (NORMODYNE;TRANDATE) injection 5 mg, 5 mg, IntraVENous, Q4H PRN, Gianni MOLINA MD, 5 mg at 03/09/23 0307    [Held by provider] heparin 25,000 units in D5W 250 ml infusion, 400 Units/hr, IntraVENous, CONTINUOUS, Lizette Linton NP, Stopped at 03/10/23 1350    0.9% sodium chloride infusion, 14 mL/hr, IntraVENous, CONTINUOUS, Gianni MOLINA MD, Last Rate: 14 mL/hr at 03/09/23 0757, 14 mL/hr at 03/09/23 0757    albumin human 25% (BUMINATE) solution 25 g, 25 g, IntraVENous, DIALYSIS PRN, Duran Delong MD, 25 g at 03/13/23 0539    insulin lispro (HUMALOG) injection, , SubCUTAneous, Q6H, Gianni MOLINA MD, 1 Units at 03/14/23 0040    hydrALAZINE (APRESOLINE) 20 mg/mL injection 5 mg, 5 mg, IntraVENous, Q6H PRN, Juan Luis Falk MD, 5 mg at 03/10/23 1316    hydrALAZINE (APRESOLINE) tablet 5 mg, 5 mg, Oral, PRN, Juan Luis Falk MD    aspirin chewable tablet 81 mg, 81 mg, Per NG tube, Naomi Hunter MD, 81 mg at 03/17/23 7341    epoetin heidy-epbx (RETACRIT) injection 10,000 Units, 10,000 Units, SubCUTAneous, Q TUE, THU & SAT, Abou-Assi, Bettie Jaffe MD, 10,000 Units at 03/16/23 2135    EPINEPHrine (ADRENALIN) 10 mg in 0.9% sodium chloride 250 mL infusion, 0-10 mcg/min, IntraVENous, TITRATE, Alyssa Johnson MD, Stopped at 03/05/23 0920    NOREPINephrine (LEVOPHED) 8 mg in 5% dextrose 250mL (32 mcg/mL) infusion, 0.5-16 mcg/min, IntraVENous, TITRATE, Alyssa Johnson MD, Last Rate: 11.3 mL/hr at 03/17/23 0939, 6 mcg/min at 03/17/23 0939    [Held by provider] colchicine tablet 0.6 mg, 0.6 mg, Oral, DAILY, Lizette Linton, NP, 0.6 mg at 03/14/23 0848    heparin (porcine) 1,000 unit/mL injection 1,700 Units, 1,700 Units, InterCATHeter, DIALYSIS PRN, 1,700 Units at 03/13/23 0819 **AND** heparin (porcine) 1,000 unit/mL injection 1,500 Units, 1,500 Units, InterCATHeter, DIALYSIS PRN, Velma Liang, DO, 1,500 Units at 03/13/23 0819    balsam peru-castor oiL (VENELEX) ointment, , Topical, BID, Luis Carlos Recio MD, Given at 03/17/23 0906    [Held by provider] acetaminophen (TYLENOL) solution 650 mg, 650 mg, Oral, Q4H PRN, Jayden Chauhan MD, 650 mg at 03/14/23 1924    [Held by provider] acetaminophen (TYLENOL) suppository 650 mg, 650 mg, Rectal, Q4H PRN, Jayden Chauhan MD, 650 mg at 02/17/23 2013    HYDROmorphone (DILAUDID) injection 0.5 mg, 0.5 mg, IntraVENous, Q3H PRN, Keyana MOLINA MD, 0.5 mg at 03/17/23 0340    HYDROmorphone (DILAUDID) injection 1 mg, 1 mg, IntraVENous, Q4H PRN, Keyana MOLINA MD, 1 mg at 03/14/23 0848    [Held by provider] heparin 25,000 units in D5W 250 ml infusion, 12-25 Units/kg/hr, IntraVENous, TITRATE, Reuben Bethea MD, Stopped at 03/08/23 0515    glucose chewable tablet 16 g, 4 Tablet, Oral, PRN, Shaila, Lizette B, NP    glucagon (GLUCAGEN) injection 1 mg, 1 mg, IntraMUSCular, PRN, Shaila Lizette B, NP    sodium chloride (NS) flush 5-40 mL, 5-40 mL, IntraVENous, Q8H, Shaila, Lizette B, NP, 10 mL at 03/17/23 0558    sodium chloride (NS) flush 5-40 mL, 5-40 mL, IntraVENous, PRN, Shaila, Lizette B, NP, 40 mL at 02/17/23 1818    naloxone (NARCAN) injection 0.4 mg, 0.4 mg, IntraVENous, PRN, Shaila, Lizette B, NP    ELECTROLYTE REPLACEMENT NOTE: Nurse to review Serum Potassium and Magnesuim levels and Initiate Electrolyte Replacement Protocol as needed, 1 Each, Other, PRN, Shaila, Lizette B, NP    dextrose 10 % infusion 0-250 mL, 0-250 mL, IntraVENous, PRN, Shaila, Lizette B, NP, Last Rate: 150 mL/hr at 02/24/23 0220, 75 mL at 02/24/23 0220    [Held by provider] acetaminophen (TYLENOL) tablet 650 mg, 650 mg, Oral, Q6H PRN, Shaila, Lizette B, NP, 650 mg at 03/13/23 2114    ondansetron (ZOFRAN) injection 4 mg, 4 mg, IntraVENous, Q4H PRN, Shaila, Lizette B, NP, 4 mg at 03/09/23 1212    albuterol-ipratropium (DUO-NEB) 2.5 MG-0.5 MG/3 ML, 3 mL, Nebulization, Q6H PRN, Shaila, Lizette B, NP    bisacodyL (DULCOLAX) suppository 10 mg, 10 mg, Rectal, DAILY PRN, Shaila, Lizette B, NP, 10 mg at 02/22/23 0839    0.45% sodium chloride infusion, 10 mL/hr, IntraVENous, CONTINUOUS, Adriana Broussard MD, Stopped at 03/10/23 1707    dexmedeTOMidine in 0.9 % NaCl (PRECEDEX) 400 mcg/100 mL (4 mcg/mL) infusion soln, 0.1-1.5 mcg/kg/hr, IntraVENous, TITRATE, Eleonora Johnson MD, Stopped at 03/08/23 2300    PATIENT CARE TEAM:  Patient Care Team:  Francis Chen MD as PCP - General (Family Medicine)  Francis Chen MD as PCP - [facility] Empaneled Provider  Modesta Garcia MD (Cardiovascular Disease Physician)  Mary Beth Laurent MD (Gastroenterology)  Mounika Rodríguez MD (Cardiothoracic Surgery)  Shonna Crespo MD (Cardiovascular Disease Physician)  Yannick Beach MD (Nephrology)  Anjum Adam MD (Pulmonary Disease)  Adriana Broussard MD ([address] [place] [address] Vascular Surgery)     Thank you for allowing me to participate in this patient's care.     Brissa Reeves MD   Advanced Heart Failure [address]  [address] [facility], [address]  Phone: [phone]    Critically ill  Critical care 60 minutes, including family meeting

## 2023-03-18 NOTE — PROGRESS NOTES
Palliative Medicine Consult  Zeferino: [phone] ([phone])    Patient Name: Carissa Easton  YOB: 1950    Date of Initial Consult: 1/31/2023  Reason for Consult: [address] Discussion  Requesting Provider: Dr. Lauren Hull  Primary Care Physician: Wyatt Camejo MD     SUMMARY:   Carissa Easotn is a 71 y.o. who was admitted on 1/26/2023 from home with a diagnosis of Acute on chronic CHF, SVT. Mr. Ebonie Rose presented to Er w/ cc of elevated heart rate and persistent difficulty urinating since donnelly catheter removal. . Wife also reported since discharge, he has had orthopnea, persistent tachycardia in 120s, low BP and bilateral lower extremity edema. Mr. Ebonie Rose was discharged 7 days ago from a prolonged and complicated [facility],97/92-6/32/2412,  2/2 decompensated heart failure, TANNER//bilat outlet obstruction resulting in bilat hydronephroses and urinary retention as well as hypoxic respiratory failure 2/2 suspected PNA., Once  stabilzed he was discharged home on IV milrinone and Life Vest as bridge to LVAD. Prior to this he was hospitalized 12/11-12/16/2022 for CHF exacerbation and before that he was hospitalized  12/5-12/8/22 for NSTEMI and cath. PMH: Cardiomyopathy w/ EF 25-30% range, on home IV Milrinone and Life Vest as bridge to LVAD, CHF, CAD, s/p CABG, NSTEMI x2 stents, deafness (+hearing aide, hears best in left ear), PAD, HTN, HLD, DM 2, CKD. Current medical issues leading to Palliative Medicine involvement include: LVAD candidacy work up. PALLIATIVE DIAGNOSES:   Palliative care encounter  End-stage heart failure  Hepatic failure  Renal Failure  CVA  Hypoxia  Goals of care discussion         PLAN:   Prior to visit completed extensive chart review , for updated information since I last saw Mr. Kendall in early February, before undergoing LVAD on 2/15/2023. I also spoke with F team  Dr. Noemi Horne and Elsa Goodell.     Unfortunately Enoch has had a very complicated hospitalization s/p LVAD, with renal failure requiring CRRT/HD, hepatic failure, CVA with left-sided hemiparesis, persistent septic shock with severe inflammatory response, failure to extubate x2 requiring tracheostomy and PEG and persistent/severe pancreatitis of unknown etiology. I met with Mrs. Kendall along with attending Dr. Sharri Recio, unit nurse General Jeane SANDOVAL and advanced heart failure team Dr. Tangela Chavez . Mrs. Kendall had their 2 children Ashleigh Loera and Alec Nguyen on speaker phone. Time spent discussing Mr. Kendall's very prolonged and complicated hospitalization and the medical teams concern for poor outcome. Discussed end-of-life scenario and providing comfort care. Dr. Vanesa Pope provided eduction regarding process for turning off the LVAD at end of life. Family with a good understanding of Mr. Teague complicated medical issues, that he may not have significant improvement and that he remains at very high risk for serious complications and  death. Family also with a good understanding of Mr. Teague previously expressed medical wishes and are committed to honoring these wishes. [address]- Family would like to continue with current level of care to see how he does over the next week, with plan to reconvene next Friday. In the meantime, should Mr. Kendall experience a catastrophic medical event, family agrees they would not want to escalate his care. Palliative will follow-up when you return on Monday. Please contact me via [employer] Messaging with any urgent needs questions or concerns. Communicated plan of care with: Palliative IDT, [facility] Team, Deepali Forrester.  Heart Failure LCSW Nya Ruiz and Dr. Lachelle Martinez RN     GOALS OF CARE / TREATMENT PREFERENCES:     GOALS OF CARE:   Patient/Health Care Proxy Stated Goals: Prolong life    TREATMENT PREFERENCES:   Code Status: Full Code    Patient and family's personal goals include: Undecided at this time    Important upcoming milestones or family events: did not address    The patient identifies the following as important for living well:       Advance Care Planning:  [x] [facility] Interdisciplinary Team has updated the ACP Navigator with [employer] and Patient Capacity      Primary Decision Maker: Susanna Kendall - Spouse - [phone]    Secondary Decision Maker: Richard Coleman - Daughter - [phone]    Secondary Decision Maker: Olivia Gitelman - [phone]  Advance Care Planning 2/9/2023   Patient's Healthcare Decision Maker is: Named in scanned ACP document   Primary Decision Maker Name -   Primary Decision Maker Phone Number -   Primary Decision Maker Relationship to Patient -   Confirm Advance Directive Yes, on file   Patient Would Like to Complete Advance Directive Yes   Does the patient have other document types -       Medical Interventions: Limited additional interventions       Other:    As far as possible, the palliative care team has discussed with patient / health care proxy about goals of care / treatment preferences for patient.      HISTORY:     History obtained from: medical record/family/nursing    CHIEF COMPLAINT:  not applicable    HPI/SUBJECTIVE:    The patient is:   [] Verbal and participatory  [x] Non-participatory due to:   Drowsy, tracheostomy    Clinical Pain Assessment (nonverbal scale for severity on nonverbal patients):   Clinical Pain Assessment  Severity: 0     Activity (Movement): Lying quietly, normal position    Duration: for how long has pt been experiencing pain (e.g., 2 days, 1 month, years)  Frequency: how often pain is an issue (e.g., several times per day, once every few days, constant)     FUNCTIONAL ASSESSMENT:     Palliative Performance Scale (PPS):        PSYCHOSOCIAL/SPIRITUAL SCREENING:     Palliative IDT has assessed this patient for cultural preferences / practices and a referral made as appropriate to needs (Cultural Services, Patient Advocacy, Ethics, etc.)    Any spiritual / Congregation concerns:  [] Yes /  [x] No   If \"Yes\" to discuss with pastoral care during IDT     Does caregiver feel burdened by caring for their loved one:   [x] Yes /  [] No /  [] No Caregiver Present/Available [] No Caregiver [] Pt Lives at Facility  If \"Yes\" to discuss with social work during IDT     Anticipatory grief assessment:   [x] Normal  / [] Maladaptive     If \"Maladaptive\" to discuss with social work during IDT    ESAS Anxiety:      ESAS Depression:          REVIEW OF SYSTEMS:     Positive and pertinent negative findings in ROS are noted above in HPI. The following systems were [] reviewed / [x] unable to be reviewed as noted in HPI  Other findings are noted below. Systems: constitutional, ears/nose/mouth/throat, respiratory, gastrointestinal, genitourinary, musculoskeletal, integumentary, neurologic, psychiatric, endocrine. Positive findings noted below. Modified ESAS Completed by: provider   Fatigue: 10 Drowsiness: 10     Pain: 0     Nausea: 10   Anorexia: 10 Dyspnea: 10           Stool Occurrence(s): 2        PHYSICAL EXAM:     From RN flowsheet:  Wt Readings from Last 3 Encounters:   03/17/23 136 lb 0.4 oz (61.7 kg)   01/25/23 123 lb (55.8 kg)   01/23/23 121 lb (54.9 kg)     Blood pressure (!) 98/53, pulse 90, temperature 98.6 °F (37 °C), resp. rate 20, height 5' 6\" (1.676 m), weight 136 lb 0.4 oz (61.7 kg), SpO2 100 %.     Pain Scale 1: Adult Nonverbal Pain Scale  Pain Intensity 1: 0  Pain Onset 1: .  Pain Location 1: Chest  Pain Orientation 1: Other (comment) (PRASHANT)  Pain Description 1: Other (comment) (PRASHANT)  Pain Intervention(s) 1: Medication (see MAR)  Last bowel movement, if known:     Constitutional: Ill-appearing, frail, weak  Eyes: pupils equal, bilateral icterus  ENMT: no nasal discharge, dry mucous membranes  Cardiovascular: LVAD sound  Respiratory: breathing not labored, symmetric, ventilator to trach  Gastrointestinal: Distended and firm  Musculoskeletal: no deformity, no tenderness to palpation  Skin: warm, dry  Neurologic: drowsy, nodding head yes/no to some questions intermittently, movement in his upper extremities, able to reach for hand to hold  Psychiatric: Unable to assess  Other:       HISTORY:     Active Problems:    CHF (congestive heart failure) ([facility] [address] (12/12/2022)    Past Medical History:   Diagnosis Date    CAD (coronary artery disease) 11/10/2016    NSTEMI & 2 stents    Deafness 10/28/2012    DM (diabetes mellitus) ([facility] [address]     Elevated cholesterol     Hypertension     NSTEMI (non-ST elevated myocardial infarction) ([facility] [address] 11/10/2016      Past Surgical History:   Procedure Laterality Date    COLONOSCOPY N/A 6/28/2018    COLONOSCOPY performed by Griselda Kern, MD at [facility]    COLONOSCOPY N/A 1/18/2023    COLONOSCOPY performed by Mary Beth Laurent MD at [facility] ENDOSCOPY    [address]  11/11/2016    2 stents      Family History   Problem Relation Age of Onset    Heart Disease Father     Heart Attack Father     Hypertension Mother     Elevated Lipids Brother     Elevated Lipids Brother     No Known Problems Sister     Elevated Lipids Brother     No Known Problems Son     No Known Problems Daughter     Anesth Problems Neg Hx       History reviewed, no pertinent family history. Social History     Tobacco Use    Smoking status: Never     Passive exposure: Never    Smokeless tobacco: Never   Substance Use Topics    Alcohol use:  Yes     Alcohol/week: 2.0 standard drinks     Types: 1 Cans of beer, 1 Shots of liquor per week     Comment: rarely     Allergies   Allergen Reactions    Ambien [Zolpidem] Other (comments)     Causes extreme confusion      Current Facility-Administered Medications   Medication Dose Route Frequency    dextrose (D50W) injection syrg 25 g  25 g IntraVENous PRN    insulin lispro (HUMALOG) injection   SubCUTAneous Q4H    bicarbonate dialysis (PRISMASOL) BG K 4/Ca 2.5 5000 ml solution   Extracorporeal DIALYSIS CONTINUOUS    levETIRAcetam (KEPPRA) injection 500 mg  500 mg IntraVENous Q12H    meropenem (MERREM) 1 g in 0.9% sodium chloride (MBP/ADV) 50 mL MBP  1 g IntraVENous Q12H    cinacalcet (SENSIPAR) tablet 60 mg  60 mg Oral BID WITH MEALS    [Held by provider] insulin NPH (NOVOLIN N, HUMULIN N) injection 10 Units  10 Units SubCUTAneous Q12H    albumin human 25% (BUMINATE) solution 12.5 g  12.5 g IntraVENous Q12H    oxyCODONE IR (ROXICODONE) tablet 5 mg  5 mg Oral Q4H PRN    oxyCODONE IR (ROXICODONE) tablet 10 mg  10 mg Oral Q4H PRN    pantoprazole (PROTONIX) 40 mg in 0.9% sodium chloride 10 mL injection  40 mg IntraVENous DAILY    labetaloL (NORMODYNE;TRANDATE) injection 5 mg  5 mg IntraVENous Q4H PRN    [Held by provider] heparin 25,000 units in D5W 250 ml infusion  400 Units/hr IntraVENous CONTINUOUS    0.9% sodium chloride infusion  14 mL/hr IntraVENous CONTINUOUS    albumin human 25% (BUMINATE) solution 25 g  25 g IntraVENous DIALYSIS PRN    hydrALAZINE (APRESOLINE) 20 mg/mL injection 5 mg  5 mg IntraVENous Q6H PRN    hydrALAZINE (APRESOLINE) tablet 5 mg  5 mg Oral PRN    aspirin chewable tablet 81 mg  81 mg Per NG tube DAILY    epoetin heidy-epbx (RETACRIT) injection 10,000 Units  10,000 Units SubCUTAneous Q TUE, THU & SAT    EPINEPHrine (ADRENALIN) 10 mg in 0.9% sodium chloride 250 mL infusion  0-10 mcg/min IntraVENous TITRATE    NOREPINephrine (LEVOPHED) 8 mg in 5% dextrose 250mL (32 mcg/mL) infusion  0.5-16 mcg/min IntraVENous TITRATE    [Held by provider] colchicine tablet 0.6 mg  0.6 mg Oral DAILY    heparin (porcine) 1,000 unit/mL injection 1,700 Units  1,700 Units InterCATHeter DIALYSIS PRN    And    heparin (porcine) 1,000 unit/mL injection 1,500 Units  1,500 Units InterCATHeter DIALYSIS PRN    balsam peru-castor oiL (VENELEX) ointment   Topical BID    [Held by provider] acetaminophen (TYLENOL) solution 650 mg  650 mg Oral Q4H PRN    [Held by provider] acetaminophen (TYLENOL) suppository 650 mg  650 mg Rectal Q4H PRN    HYDROmorphone (DILAUDID) injection 0.5 mg 0.5 mg IntraVENous Q3H PRN    HYDROmorphone (DILAUDID) injection 1 mg  1 mg IntraVENous Q4H PRN    [Held by provider] heparin 25,000 units in D5W 250 ml infusion  12-25 Units/kg/hr IntraVENous TITRATE    glucose chewable tablet 16 g  4 Tablet Oral PRN    glucagon (GLUCAGEN) injection 1 mg  1 mg IntraMUSCular PRN    sodium chloride (NS) flush 5-40 mL  5-40 mL IntraVENous Q8H    sodium chloride (NS) flush 5-40 mL  5-40 mL IntraVENous PRN    naloxone (NARCAN) injection 0.4 mg  0.4 mg IntraVENous PRN    ELECTROLYTE REPLACEMENT NOTE: Nurse to review Serum Potassium and Magnesuim levels and Initiate Electrolyte Replacement Protocol as needed  1 Each Other PRN    dextrose 10 % infusion 0-250 mL  0-250 mL IntraVENous PRN    [Held by provider] acetaminophen (TYLENOL) tablet 650 mg  650 mg Oral Q6H PRN    ondansetron (ZOFRAN) injection 4 mg  4 mg IntraVENous Q4H PRN    albuterol-ipratropium (DUO-NEB) 2.5 MG-0.5 MG/3 ML  3 mL Nebulization Q6H PRN    bisacodyL (DULCOLAX) suppository 10 mg  10 mg Rectal DAILY PRN    0.45% sodium chloride infusion  10 mL/hr IntraVENous CONTINUOUS    dexmedeTOMidine in 0.9 % NaCl (PRECEDEX) 400 mcg/100 mL (4 mcg/mL) infusion soln  0.1-1.5 mcg/kg/hr IntraVENous TITRATE          LAB AND IMAGING FINDINGS:     Lab Results   Component Value Date/Time    WBC 7.7 03/17/2023 02:10 AM    HGB 7.9 (L) 03/17/2023 02:10 AM    PLATELET 472 83/13/2303 02:10 AM     Lab Results   Component Value Date/Time    Sodium 134 (L) 03/17/2023 02:10 AM    Potassium 4.2 03/17/2023 02:10 AM    Chloride 102 03/17/2023 02:10 AM    CO2 25 03/17/2023 02:10 AM    BUN 40 (H) 03/17/2023 02:10 AM    Creatinine 2.23 (H) 03/17/2023 02:10 AM    Calcium 10.9 (H) 03/17/2023 02:10 AM    Magnesium 3.0 (H) 03/17/2023 02:10 AM    Phosphorus 2.4 (L) 03/17/2023 02:10 AM      Lab Results   Component Value Date/Time    Alk.  phosphatase 432 (H) 03/17/2023 02:10 AM    Protein, total 6.7 03/17/2023 02:10 AM    Albumin 3.5 03/17/2023 02:10 AM Globulin 3.2 03/17/2023 02:10 AM     (H) 03/10/2023 07:37 AM     Lab Results   Component Value Date/Time    INR 1.7 (H) 03/17/2023 02:10 AM    Prothrombin time 17.1 (H) 03/17/2023 02:10 AM    aPTT 45.2 (H) 03/09/2023 06:26 PM      Lab Results   Component Value Date/Time    Iron 37 03/05/2023 05:09 AM    TIBC 152 (L) 03/05/2023 05:09 AM    Iron % saturation 24 03/05/2023 05:09 AM    Ferritin 595 (H) 03/05/2023 05:09 AM      No results found for: PH, PCO2, PO2  No components found for: [place]   Lab Results   Component Value Date/Time    CK 76 03/05/2023 05:09 AM    CK - MB 5.3 (H) 11/10/2016 03:44 PM                Total time: 50 minutes  .

## 2023-03-18 NOTE — PROGRESS NOTES
1930: Bedside and Verbal shift change report given to Tomasa Diamond RN (oncoming nurse) by Sangita Perez RN (offgoing nurse). Report included the following information SBAR, Intake/Output, MAR, Recent Results, and Cardiac Rhythm NSR .      2055: Pt's wife and son called; updated by RN    2145: Pt restarted on CRRT    Shift Summary: Pt stable overnight; had 2 bowel movements    0730: Bedside and Verbal shift change report given to Sangita Perez RN (oncoming nurse) by Tomasa Diamond RN (offgoing nurse). Report included the following information SBAR, Intake/Output, MAR, Recent Results, and Cardiac Rhythm NSR .

## 2023-03-18 NOTE — PROGRESS NOTES
CRITICAL CARE NOTE      Name: Rosibel Locke   : 1950   MRN: [id number]   Date: 3/18/2023      Reason for ICU Admission: Acute on chronic HFrEF     ICU PROBLEM LIST   Acute on chronic HFrEF (20%) NYHA IIIb/IV (D) on home inotropic support, life vest s/p LVAD  TANNER on CKD3, on CRRT  CHB post op, resolved  Aflutter w/ RVR  Acute on chronic hypoxemic respiratory failure, s/p trach placement  CAP  Gastric neuroendocrine tumor  Hx of LUE DVT  DM  PAD  TRISTAN  BPH w/ urinary retention requiring chronic donnelly    HISTORY OF PRESENT ILLNESS:   In brief, A 71year old male PMH as noted above admitted to [address] [place] [address] on  due to ongoing symptoms secondary to his HFrEF. Treated for possible CAP, and diuresed for acute on chronic HFrEF. Nephrology following for TANNER on CKD 3. AHF team recommended transfer to CVICU for [facility] placement and ongoing LVAD workup, with placement 2/15. Pt extubated , however with development of worsening renal dysfunction overnight and unable to tolerate CRRT so was reintubated and taken for Impella RP placement for right-sided support in a.m. of  and CRRT resumed. Impella removed on 3/1. Overnight 3/1-3/1 CVA noted with SAH.  3/2 SAH enlarged. Held anticoagulation. Failed extubation 3/2. Trach placed 3/7. Transitioned to [facility] [place], operated by [facility]. Repeat CTH 3/10 w/ persistent SAH, heparin held. 24 HOUR EVENTS:   No acute events overnight    ASSESSMENT AND PLANS:   In brief, A 71year old male PMH as noted above admitted to [address] [place] [address] on  due to ongoing symptoms secondary to his HFrEF with SAH, s/p trach, dialysis.       NEUROLOGICAL: Acute encephalopathy, critical illness polyneuropathy   -Continue Keppra  -Delirium precautions      PULMONOLOGY: Acute proximal respiratory failure, status post tracheostomy 2023   -Continue to monitor oxygen saturations  -Continue mechanical ventilation  -Continue wean ventilator as tolerated  -Daily SBT  -Pulmonary hygiene      CARDIOVASCULAR: Ischemic and nonischemic cardiomyopathy, left ventricular ejection fraction 25 to 30%, status post LVAD 15 February 2023, status post Impella right side removed 1 March 2023, cardiorenal syndrome   -Continue monitor hemodynamic status  -Continue aspirin  -Continue vasopressors  -Wean as tolerated      GASTROINTESTINAL: Hepatic congestion, hyperbilirubinemia, possible cirrhosis, pancreatitis   -Continue enteral feeds  -Continue GI prophylaxis  -Abdominal ultrasound reviewed  -Elevated lipase  -Abdominal CT reviewed     RENAL/ELECTROLYTE/FLUIDS: Acute on chronic renal failure   -Continue hemodialysis with continuous renal replacement therapy  -Strict I's and O's  -Place electrolytes as indicated  -Nephrology following    -CRRT  -BUN 28  -Creatinine 2.06    ENDOCRINE:   -Continue to monitor blood glucose levels   -Maintain blood glucose levels between 140-180   -Sliding scale insulin   HEMATOLOGY/ONCOLOGY: Acute on chronic anemia, gastric neuroendocrine tumor   -Hbg 7.6  -Plt 244  ID/MICRO:   -WBC 7.0  -No cultures since 9 March 2023  ICU DAILY CHECKLIST   Code Status:Full  DVT Prophylaxis: SCDs  SUP: PPI  Diet: TF  Activity Level:ad jose f  ABCDEF Bundle/Checklist Completed:Yes  Disposition: Stay in ICU  Multidisciplinary Rounds Completed:  yes  Patient/Family Updated: [address]   2/3 Transferred to CVICU for [facility] placement  2/7 started on dobutamine as adjunct to milrinone  2/15 LVAD implant  2/16 extubated  2/18 Impella RP placement  3/1 Impella removed  3/1-2 SAH on CT  3/3 RE-intubated  3/6 Blakes removed, transitioned to HD  3/7 Trach placment  3/10 persistent SAH, heparin stopped  3/11 HD catheter replaced    SUBJECTIVE:     As noted above    Review of Systems:     Unable to perform due to patient trached and mental status    OBJECTIVE:     Labs and Data: Reviewed 03/18/23  Medications: Reviewed 03/18/23  Imaging: Reviewed 03/18/23    General - unresponsive, chronically ill appearing  HEENT- pupils equal, no nystagmus, jaundiced sclerea  Neuro - unresponsive  CV - Paced,  post sternotomy  Resp - trached, good airflow bilateral without wheezing or rales. Decreased at bases  Abd - soft, not distended, nontender  MSK- intact, no sacral ulcer, anasarca  SKIN: juandiced    Visit Vitals  BP 97/76   Pulse 90   Temp 98.8 °F (37.1 °C)   Resp 26   Ht 5' 6\" (1.676 m)   Wt 63 kg (138 lb 14.2 oz)   SpO2 100%   BMI 22.42 kg/m²    O2 Flow Rate (L/min): 20 l/min O2 Device: Ventilator, Tracheostomy Temp (24hrs), Av.8 °F (37.1 °C), Min:98.6 °F (37 °C), Max:98.9 °F (37.2 °C)    CVP (mmHg): 14 mmHg (23 1300)      Intake/Output:     Intake/Output Summary (Last 24 hours) at 3/18/2023 1422  Last data filed at 3/18/2023 1359  Gross per 24 hour   Intake 810.24 ml   Output 565.3 ml   Net 244.94 ml     Imaging  All images and labs were reviewed by me personally. 23    ECHO ADULT FOLLOW-UP OR LIMITED 2023    Interpretation Summary    Left Ventricle: EF by visual approximation is 20%. Left ventricle is mildly dilated. Mitral Valve: Moderately thickened leaflet, at the anterior and posterior leaflets. Moderately calcified leaflet. Moderate regurgitation. Left Atrium: Left atrium is moderately dilated. Technical qualifiers: Echo study was technically difficult with poor endocardial visualization. Contrast used: Definity. Limited study, not all structures viewed    Signed by: Bekah Daly MD on 2023  4:39 PM       Pertinent imaging reviewed and interpreted as noted above    CRITICAL CARE DOCUMENTATION  I had a face to face encounter with the patient, reviewed and interpreted patient data including clinical events, labs, images, vital signs, I/O's, and examined patient.   I have discussed the case and the plan and management of the patient's care with the consulting services, the bedside nurses and the respiratory therapist.      NOTE OF PERSONAL INVOLVEMENT IN CARE   This patient has a high probability of imminent, clinically significant deterioration, which requires the highest level of preparedness to intervene urgently. I participated in the decision-making and personally managed or directed the management of the following life and organ supporting interventions that required my frequent assessment to treat or prevent imminent deterioration. I personally spent 40 minutes of critical care time. This is time spent at this critically ill patient's bedside actively involved in patient care as well as the coordination of care. This does not include any procedural time which has been billed separately.     Salvador Del Rosario MD  Critical Care Medicine  [facility]

## 2023-03-18 NOTE — DIALYSIS
CRRT / [phone]    Orders   Mode: CVVH restarted @2145   Blood Flow Rate: 200 ml/min   Prismasol Dose: PBP: 800 ml/hr  Replacement 1: 400 ml/hr  Replacement 2: 400 ml/hr   Prismasol Concentrate: 4K / 2.5Ca   Blood Warmer Temp: 37*c   Net Fluid Removal: 0 ml/hr      Metrics   BP: 109/62   HR: 91   Access Pressure: -45   Filter Pressure: 185   Return Pressure: 101   TMP: 55   Pressure Drop: 43     Access   Type & Location: [facility] non-tunneled CVC C/D/I with transparent dressing and biopatch in place dated 03/17/23. Labs   HBsAg (Antigen) / date: Negative  03/13/23                                              HBsAb (Antibody) / date: Susceptible  03/13/23   Source: [facility]     Safety:   Time Out Done:   (Time) 2140   Consent obtained/signed: Verified   Education: Access/Site Care   Primary Nurse Rpt Pre: Governor Elia RN   Primary Nurse Rpt Post: Governor Elia RN     Comments / Plan:   Patient, orders, line and consent verified. Labs, notes and code status reviewed. CRRT restarted after Filter clot. Primary RN unable to return blood. Lines visible/secure with blood warmer attached to the return line and set to 37C*. Education and Pre/Post with primary RN.

## 2023-03-18 NOTE — PROGRESS NOTES
1600- Bedside report received. Assessment performed. Drips verified. 1630- Patient incontinent of moderate, liquid brown stool. Incontinence care performed and patient repositioned.     1700- LVAD dressing changed

## 2023-03-18 NOTE — PROGRESS NOTES
Name: Romain Wright   MRN: [id number]  : 1950      Assessment:  TANNER on CKD-3a: Suspect cardiorenal effects initially. Now has LVAD and  POST OP ATN. Anuric->Requiring CRRT>>iHD-> back to CRRT 3/15/23. left IJ Hermelindo catheter was last replaced on 3/11/2023 by intensivist    hypercalcemia/immobilization- also has elevated PTH; may have component of 1ry. Ca improving but not at goal. On BID dosing of Sensipar-> titrating up dose  Elevated LFTs/Hyperbilirubinemia  Possible pancreatitis- with elevated lipase; Hypercalcemia can contribute  Ischemic CM: Recurrent exacerbations. EF 20%. S/p LVAD on 2/15. 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 Factor rate at 0cc/hr  Levophed  INcrease Sensipar to 90mg BID   Holding Colchicine  BRIGHT  AM labs      Subjective:  RN at bedside. On CVVH. On Levophed still. More awake.      ROS:   UTO    Exam:  Visit Vitals  BP 97/76   Pulse 97   Temp 98.6 °F (37 °C)   Resp 25   Ht 5' 6\" (1.676 m)   Wt 63 kg (138 lb 14.2 oz)   SpO2 99%   BMI 22.42 kg/m²     NAD/Frail  NGT  +icterus  +trach  Abd distension  Tr edema-improved  + Jaundice    Current Facility-Administered Medications   Medication Dose Route Frequency Last Admin    cinacalcet (SENSIPAR) tablet 90 mg  90 mg Oral BID WITH MEALS      cinacalcet (SENSIPAR) tablet 30 mg  30 mg Oral ONCE      insulin lispro (HUMALOG) injection   SubCUTAneous Q4H      bicarbonate dialysis (PRISMASOL) BG K 4/Ca 2.5 5000 ml solution   Extracorporeal DIALYSIS CONTINUOUS 1,600 mL/hr at 23 1007    levETIRAcetam (KEPPRA) injection 500 mg  500 mg IntraVENous Q12H 500 mg at 03/18/23 0910    meropenem (MERREM) 1 g in 0.9% sodium chloride (MBP/ADV) 50 mL MBP  1 g IntraVENous Q12H 1 g at 03/18/23 0545    [Held by provider] insulin NPH (NOVOLIN N, HUMULIN N) injection 10 Units  10 Units SubCUTAneous Q12H 10 Units at 03/15/23 2112    albumin human 25% (BUMINATE) solution 12.5 g  12.5 g IntraVENous Q12H 12.5 g at 03/18/23 0910    oxyCODONE IR (ROXICODONE) tablet 5 mg  5 mg Oral Q4H PRN 5 mg at 03/17/23 0841    oxyCODONE IR (ROXICODONE) tablet 10 mg  10 mg Oral Q4H PRN 10 mg at 03/16/23 1857    pantoprazole (PROTONIX) 40 mg in 0.9% sodium chloride 10 mL injection  40 mg IntraVENous DAILY 40 mg at 03/18/23 0913    labetaloL (NORMODYNE;TRANDATE) injection 5 mg  5 mg IntraVENous Q4H PRN 5 mg at 03/09/23 0307    [Held by provider] heparin 25,000 units in D5W 250 ml infusion  400 Units/hr IntraVENous CONTINUOUS Stopped at 03/10/23 1350    0.9% sodium chloride infusion  14 mL/hr IntraVENous CONTINUOUS 11 mL/hr at 03/18/23 0748    albumin human 25% (BUMINATE) solution 25 g  25 g IntraVENous DIALYSIS PRN 25 g at 03/13/23 0539    hydrALAZINE (APRESOLINE) 20 mg/mL injection 5 mg  5 mg IntraVENous Q6H PRN 5 mg at 03/10/23 1316    hydrALAZINE (APRESOLINE) tablet 5 mg  5 mg Oral PRN      aspirin chewable tablet 81 mg  81 mg Per NG tube DAILY 81 mg at 03/18/23 0910    epoetin heidy-epbx (RETACRIT) injection 10,000 Units  10,000 Units SubCUTAneous Q TUE, THU & SAT 10,000 Units at 03/16/23 2135    EPINEPHrine (ADRENALIN) 10 mg in 0.9% sodium chloride 250 mL infusion  0-10 mcg/min IntraVENous TITRATE Stopped at 03/05/23 0920    NOREPINephrine (LEVOPHED) 8 mg in 5% dextrose 250mL (32 mcg/mL) infusion  0.5-16 mcg/min IntraVENous TITRATE 5 mcg/min at 03/18/23 0949    [Held by provider] colchicine tablet 0.6 mg  0.6 mg Oral DAILY 0.6 mg at 03/14/23 0848    heparin (porcine) 1,000 unit/mL injection 1,700 Units  1,700 Units InterCATHeter DIALYSIS PRN 1,700 Units at 03/13/23 0819    And    heparin (porcine) 1,000 unit/mL injection 1,500 Units  1,500 Units InterCATHeter DIALYSIS PRN 1,500 Units at 03/13/23 0819    balsam peru-castor oiL (VENELEX) ointment   Topical BID Given at 03/18/23 1009    [Held by provider] acetaminophen (TYLENOL) solution 650 mg  650 mg Oral Q4H  mg at 03/14/23 1924    [Held by provider] acetaminophen (TYLENOL) suppository 650 mg  650 mg Rectal Q4H  mg at 02/17/23 2013    HYDROmorphone (DILAUDID) injection 0.5 mg  0.5 mg IntraVENous Q3H PRN 0.5 mg at 03/18/23 0945    HYDROmorphone (DILAUDID) injection 1 mg  1 mg IntraVENous Q4H PRN 1 mg at 03/14/23 0848    [Held by provider] heparin 25,000 units in D5W 250 ml infusion  12-25 Units/kg/hr IntraVENous TITRATE Stopped at 03/08/23 0515    glucose chewable tablet 16 g  4 Tablet Oral PRN      glucagon (GLUCAGEN) injection 1 mg  1 mg IntraMUSCular PRN      sodium chloride (NS) flush 5-40 mL  5-40 mL IntraVENous Q8H 10 mL at 03/18/23 0538    sodium chloride (NS) flush 5-40 mL  5-40 mL IntraVENous PRN 40 mL at 02/17/23 1818    naloxone (NARCAN) injection 0.4 mg  0.4 mg IntraVENous PRN      ELECTROLYTE REPLACEMENT NOTE: Nurse to review Serum Potassium and Magnesuim levels and Initiate Electrolyte Replacement Protocol as needed  1 Each Other PRN      dextrose 10 % infusion 0-250 mL  0-250 mL IntraVENous PRN 75 mL at 02/24/23 0220    [Held by provider] acetaminophen (TYLENOL) tablet 650 mg  650 mg Oral Q6H  mg at 03/13/23 2114    ondansetron (ZOFRAN) injection 4 mg  4 mg IntraVENous Q4H PRN 4 mg at 03/09/23 1212    albuterol-ipratropium (DUO-NEB) 2.5 MG-0.5 MG/3 ML  3 mL Nebulization Q6H PRN      bisacodyL (DULCOLAX) suppository 10 mg  10 mg Rectal DAILY PRN 10 mg at 02/22/23 0839    0.45% sodium chloride infusion  10 mL/hr IntraVENous CONTINUOUS Stopped at 03/10/23 1707    dexmedeTOMidine in 0.9 % NaCl (PRECEDEX) 400 mcg/100 mL (4 mcg/mL) infusion soln  0.1-1.5 mcg/kg/hr IntraVENous TITRATE Stopped at 03/08/23 2300       Labs/Data:    Lab Results   Component Value Date/Time    WBC 7.0 03/18/2023 03:17 AM    HGB 7.6 (L) 03/18/2023 03:17 AM    HCT 23.2 (L) 03/18/2023 03:17 AM    PLATELET 565 31/30/6304 03:17 AM    .0 (H) 03/18/2023 03:17 AM       Lab Results   Component Value Date/Time    Sodium 137 03/18/2023 03:17 AM    Potassium 4.3 03/18/2023 03:17 AM    Chloride 104 03/18/2023 03:17 AM    CO2 24 03/18/2023 03:17 AM    Anion gap 9 03/18/2023 03:17 AM    Glucose 81 03/18/2023 03:17 AM    BUN 28 (H) 03/18/2023 03:17 AM    Creatinine 2.06 (H) 03/18/2023 03:17 AM    BUN/Creatinine ratio 14 03/18/2023 03:17 AM    GFR est AA 46 (L) 06/23/2022 09:40 AM    GFR est non-AA 38 (L) 06/23/2022 09:40 AM    eGFR 34 (L) 03/18/2023 03:17 AM    eGFR 69 01/25/2023 11:55 AM    Calcium 10.9 (H) 03/18/2023 03:17 AM       Wt Readings from Last 3 Encounters:   03/18/23 63 kg (138 lb 14.2 oz)   01/25/23 55.8 kg (123 lb)   01/23/23 54.9 kg (121 lb)         Intake/Output Summary (Last 24 hours) at 3/18/2023 1331  Last data filed at 3/18/2023 1200  Gross per 24 hour   Intake 753.94 ml   Output 273.8 ml   Net 480.14 ml         Patient seen and examined. Chart reviewed. Labs, data and other pertinent notes reviewed in last 24 hrs.     PMH/SH/FH reviewed and unchanged compared to H&P    Discussed case with CVICU LORI Pride, MD  [facility]

## 2023-03-18 NOTE — PROGRESS NOTES
0800: Dr. Beualieu Bills in toDAY [address]. Pt awake and in good spirits except having abd pain. Medicated for this. CRRT restarted after flushing. Dr Kajal Mederos orders TF to be restarted. 1200 Pt placed on an  SBT and did well 30 minutes now RR is more irregular and he is tiring some. 123o placed back on rate of 20 to rest. Pt very awake and interacted with Dr. Ascencion Coon     [phone] Pt's wife and brother here to visit. Pt is interacting with them. 1530: Bedside and Verbal shift change report given to Lucio Whitman (oncoming nurse) by [facility] (offgoing nurse). Report included the following information Procedure Summary, Intake/Output, MAR, Recent Results, Med Rec Status, and Cardiac Rhythm NSR .

## 2023-03-18 NOTE — PROGRESS NOTES
Problem: Diabetes Self-Management  Goal: *Disease process and treatment process  Description: Define diabetes and identify own type of diabetes; list 3 options for treating diabetes. Outcome: Progressing Towards Goal  Goal: *Incorporating nutritional management into lifestyle  Description: Describe effect of type, amount and timing of food on blood glucose; list 3 methods for planning meals. Outcome: Progressing Towards Goal  Goal: *Incorporating physical activity into lifestyle  Description: State effect of exercise on blood glucose levels. Outcome: Progressing Towards Goal  Goal: *Developing strategies to promote health/change behavior  Description: Define the ABC's of diabetes; identify appropriate screenings, schedule and personal plan for screenings. Outcome: Progressing Towards Goal  Goal: *Using medications safely  Description: State effect of diabetes medications on diabetes; name diabetes medication taking, action and side effects. Outcome: Progressing Towards Goal  Goal: *Monitoring blood glucose, interpreting and using results  Description: Identify recommended blood glucose targets  and personal targets. Outcome: Progressing Towards Goal  Goal: *Prevention, detection, treatment of acute complications  Description: List symptoms of hyper- and hypoglycemia; describe how to treat low blood sugar and actions for lowering  high blood glucose level. Outcome: Progressing Towards Goal  Goal: *Prevention, detection and treatment of chronic complications  Description: Define the natural course of diabetes and describe the relationship of blood glucose levels to long term complications of diabetes.   Outcome: Progressing Towards Goal  Goal: *Developing strategies to address psychosocial issues  Description: Describe feelings about living with diabetes; identify support needed and support network  Outcome: Progressing Towards Goal  Goal: *Insulin pump training  Outcome: Progressing Towards Goal  Goal: *Sick day guidelines  Outcome: Progressing Towards Goal  Goal: *Patient Specific Goal (EDIT GOAL, INSERT TEXT)  Outcome: Progressing Towards Goal     Problem: Patient Education: Go to Patient Education Activity  Goal: Patient/Family Education  Outcome: Progressing Towards Goal     Problem: Pressure Injury - Risk of  Goal: *Prevention of pressure injury  Description: Document Mike Scale and appropriate interventions in the flowsheet. Outcome: Progressing Towards Goal  Note: Pressure Injury Interventions:  Sensory Interventions: Assess need for specialty bed, Keep linens dry and wrinkle-free, Float heels    Moisture Interventions: Apply protective barrier, creams and emollients    Activity Interventions: Increase time out of bed    Mobility Interventions: Pressure redistribution bed/mattress (bed type)    Nutrition Interventions: Discuss nutritional consult with provider    Friction and Shear Interventions: Apply protective barrier, creams and emollients                Problem: Patient Education: Go to Patient Education Activity  Goal: Patient/Family Education  Outcome: Progressing Towards Goal     Problem: Falls - Risk of  Goal: *Absence of Falls  Description: Document Denis Reveal Fall Risk and appropriate interventions in the flowsheet.   Outcome: Progressing Towards Goal  Note: Fall Risk Interventions:  Mobility Interventions: PT Consult for assist device competence, PT Consult for mobility concerns, OT consult for ADLs         Medication Interventions: Teach patient to arise slowly, Bed/chair exit alarm    Elimination Interventions: Call light in reach    History of Falls Interventions: Door open when patient unattended, Consult care management for discharge planning         Problem: Patient Education: Go to Patient Education Activity  Goal: Patient/Family Education  Outcome: Progressing Towards Goal     Problem: Pain  Goal: *Control of Pain  Outcome: Progressing Towards Goal  Goal: *PALLIATIVE CARE:  Alleviation of Pain  Outcome: Progressing Towards Goal     Problem: Patient Education: Go to Patient Education Activity  Goal: Patient/Family Education  Outcome: Progressing Towards Goal     Problem: Patient Education: Go to Patient Education Activity  Goal: Patient/Family Education  Outcome: Progressing Towards Goal     Problem: Patient Education: Go to Patient Education Activity  Goal: Patient/Family Education  Outcome: Progressing Towards Goal     Problem: Nutrition Deficit  Goal: *Optimize nutritional status  Outcome: Progressing Towards Goal     Problem: Ventilator Management  Goal: *Adequate oxygenation and ventilation  Outcome: Progressing Towards Goal  Goal: *Patient maintains clear airway/free of aspiration  Outcome: Progressing Towards Goal  Goal: *Absence of infection signs and symptoms  Outcome: Progressing Towards Goal  Goal: *Normal spontaneous ventilation  Outcome: Progressing Towards Goal     Problem: Patient Education: Go to Patient Education Activity  Goal: Patient/Family Education  Outcome: Progressing Towards Goal     Problem: Patient Education: Go to Patient Education Activity  Goal: Patient/Family Education  Outcome: Progressing Towards Goal     Problem: LVAD Pre-op Period  Goal: Off Pathway (Use only if patient is Off Pathway)  Outcome: Progressing Towards Goal  Goal: Activity/Safety  Outcome: Progressing Towards Goal  Goal: Consults, if ordered  Outcome: Progressing Towards Goal  Goal: Diagnostic Test/Procedures  Outcome: Progressing Towards Goal  Goal: Nutrition/Diet  Outcome: Progressing Towards Goal  Goal: Medications  Outcome: Progressing Towards Goal  Goal: Treatments/Interventions/Procedures  Outcome: Progressing Towards Goal  Goal: Discharge Planning  Outcome: Progressing Towards Goal  Goal: Psychosocial  Outcome: Progressing Towards Goal  Goal: *Hemodynamically stable (eg: Cardiac output/index; pulmonary arterial pressures; mixed venous oxygen saturation within set parameters)  Outcome: Progressing Towards Goal  Goal: *Labs/tests completed  Outcome: Progressing Towards Goal     Problem: LVAD Pre-Op Day  Goal: Off Pathway (Use only if patient is Off Pathway)  Outcome: Progressing Towards Goal  Goal: Activity/Safety  Outcome: Progressing Towards Goal  Goal: Consults, if ordered  Outcome: Progressing Towards Goal  Goal: Diagnostic Test/Procedures  Outcome: Progressing Towards Goal  Goal: Nutrition/Diet  Outcome: Progressing Towards Goal  Goal: Medications  Outcome: Progressing Towards Goal  Goal: Treatments/Interventions/Procedures  Outcome: Progressing Towards Goal  Goal: Discharge Planning  Outcome: Progressing Towards Goal  Goal: Psychosocial  Outcome: Progressing Towards Goal  Goal: *Vital signs within specified parameters  Outcome: Progressing Towards Goal  Goal: *Consent obtained, permits and diagnostics complete  Outcome: Progressing Towards Goal  Goal: *Confirmation of post discharge primary support person(s)  Outcome: Progressing Towards Goal     Problem: LVAD Day of Surgery (Initiate SCIP measure for post-op care)  Goal: Off Pathway (Use only if patient is Off Pathway)  Outcome: Progressing Towards Goal  Goal: Activity/Safety  Outcome: Progressing Towards Goal  Goal: Consults, if ordered  Outcome: Progressing Towards Goal  Goal: Diagnostic Test/Procedures  Outcome: Progressing Towards Goal  Goal: Nutrition/Diet  Outcome: Progressing Towards Goal  Goal: Medications  Outcome: Progressing Towards Goal  Goal: Respiratory  Outcome: Progressing Towards Goal  Goal: Treatments/Interventions/Procedures  Outcome: Progressing Towards Goal  Goal: Psychosocial  Outcome: Progressing Towards Goal  Goal: *Hemodynamically stable (eg: Cardiac output/index; pulmonary arterial pressures; mixed venous oxygen saturation within set parameters)  Outcome: Progressing Towards Goal  Goal: *Lungs clear or at baseline  Outcome: Progressing Towards Goal  Goal: *Stable cardiac rhythm  Outcome: Progressing Towards Goal  Goal: *Follows simple commands post anesthesia  Outcome: Progressing Towards Goal  Goal: *Optimal pain control at patient's stated goal  Outcome: Progressing Towards Goal  Goal: *LVAD parameters within set limits  Outcome: Progressing Towards Goal     Problem: Nutrition Deficit  Goal: *Optimize nutritional status  Outcome: Progressing Towards Goal

## 2023-03-18 NOTE — PROGRESS NOTES
[facility] VAD/Heart Failure Progress Note    Admit Date: 2023  POD:  28 Days Post-Op    Procedure:  Procedure(s):  LEFT GROIN RP IMPELLA INSERTION, KIARRA BY DR Darian Ponce        Subjective:   Working on SBT; re-starting TFs     Objective:   Vitals:  Blood pressure 97/76, pulse 97, temperature 98.6 °F (37 °C), resp. rate 25, height 5' 6\" (1.676 m), weight 138 lb 14.2 oz (63 kg), SpO2 99 %.   Temp (24hrs), Av.7 °F (37.1 °C), Min:98.6 °F (37 °C), Max:98.9 °F (37.2 °C)    Hemodynamics:   CO: CO (l/min): 4.8 l/min   CI: CI (l/min/m2): 2.7 l/min/m2   CVP: CVP (mmHg): 12 mmHg (23 1100)   SVR: SVR (dyne*sec)/cm5: 1106 (dyne*sec)/cm5 (03/10/23 1856)   PAP Systolic: PAP Systolic: 44 (95/08/30 5202)   PAP Diastolic: PAP Diastolic: 23 (70//09 5314)   PVR:     SV02: SVO2 (%): 72 % (03/10/23 0900)   SCV02:      VAD Interrogation: LVAD (Heartmate)  Pump Speed (RPM): 4700  Pump Flow (LPM): 3.3  PI (Pulsitility Index): 4.8  Power: 3  MAP: 82 (Doppler)   Test: Yes  Back Up  at Bedside & Labeled: Yes  Power Module Test: Yes  Driveline Site Care: Yes  Driveline Dressing: Changed per order    EKG/Rhythm:      Extubation Date / Time:     CT Output:     Ventilator:  Ventilator Volumes  Vt Set (ml): 350 ml (23 0405)  Vt Exhaled (Machine Breath) (ml): 404 ml (23 0405)  Vt Spont (ml): 122 ml (23 1410)  Ve Observed (l/min): 8.58 l/min (23 0405)    Oxygen Therapy:  Oxygen Therapy  O2 Sat (%): 99 % (23 1100)  Pulse via Oximetry:  (assessment unchanged) (23)  O2 Device: Ventilator;Tracheostomy (23)  Skin Assessment: Clean, dry, & intact (23)  Skin Protection for O2 Device: Yes (23)  Orientation: Middle (23)  Location: Neck (23)  Interventions: Mouth Care (23)  O2 Flow Rate (L/min): 20 l/min (23)  O2 Temperature: 104 °F (40 °C) (23 1200)  FIO2 (%): 40 % (03/18/23 0405)  ETCO2 (mmHg): 37 mmHg (03/18/23 1100)    CXR:    Admission Weight: Last Weight   Weight: 120 lb (54.4 kg) Weight: 138 lb 14.2 oz (63 kg)     Intake / Output / Drain:  Current Shift: 03/18 0701 - 03/18 1900  In: 327.7 [I.V.:157.7]  Out: 57.8   Last 24 hrs.:   Intake/Output Summary (Last 24 hours) at 3/18/2023 1244  Last data filed at 3/18/2023 1200  Gross per 24 hour   Intake 778.04 ml   Output 401.8 ml   Net 376.24 ml           No results for input(s): CPK, CKMB, TROIQ in the last 72 hours.   Recent Labs     03/18/23 0317 03/17/23  0210 03/16/23  1555 03/16/23  0348    134*  --  134*   K 4.3 4.2  --  4.2   CO2 24 25  --  21   BUN 28* 40*  --  65*   CREA 2.06* 2.23*  --  3.26*   GLU 81 83  --  162*   PHOS 2.3* 2.4* 2.6 3.4   MG 3.0* 3.0*  --  3.0*   WBC 7.0 7.7  --  7.7   HGB 7.6* 7.9*  --  7.7*   HCT 23.2* 23.9*  --  23.3*    252  --  234     Recent Labs     03/18/23 0317 03/17/23  0210 03/16/23  0348   INR 1.9* 1.7* 1.6*   PTP 19.0* 17.1* 16.3*     No lab exists for component: PBNP        Current Facility-Administered Medications:     cinacalcet (SENSIPAR) tablet 90 mg, 90 mg, Oral, BID WITH MEALS, Jadiel Galvan MD    cinacalcet (SENSIPAR) tablet 30 mg, 30 mg, Oral, ONCE, Jadiel Galvan MD    insulin lispro (HUMALOG) injection, , SubCUTAneous, Q4H, Cathy Sheldon, SLICK    bicarbonate dialysis (PRISMASOL) BG K 4/Ca 2.5 5000 ml solution, , Extracorporeal, DIALYSIS CONTINUOUS, Jadiel Galvan MD, Last Rate: 1,600 mL/hr at 03/18/23 1007, 1,600 mL/hr at 03/18/23 1007    levETIRAcetam (KEPPRA) injection 500 mg, 500 mg, IntraVENous, Q12H, Judit Durham MD, 500 mg at 03/18/23 0910    [COMPLETED] meropenem (MERREM) 1 g in 0.9% sodium chloride 20 mL IV syringe, 1 g, IntraVENous, NOW, 1 g at 03/15/23 1734 **FOLLOWED BY** meropenem (MERREM) 1 g in 0.9% sodium chloride (MBP/ADV) 50 mL MBP, 1 g, IntraVENous, Q12H, Judit Durham MD, Last Rate: 16.7 mL/hr at 03/18/23 0545, 1 g at 03/18/23 0545 [Held by provider] insulin NPH (NOVOLIN N, HUMULIN N) injection 10 Units, 10 Units, SubCUTAneous, Q12H, Cathy Sheldon, SLICK, 10 Units at 03/15/23 2112    albumin human 25% (BUMINATE) solution 12.5 g, 12.5 g, IntraVENous, Q12H, Lizette Linton NP, 12.5 g at 03/18/23 0910    oxyCODONE IR (ROXICODONE) tablet 5 mg, 5 mg, Oral, Q4H PRN, [place] Ju[place] G, DO, 5 mg at 03/17/23 0841    oxyCODONE IR (ROXICODONE) tablet 10 mg, 10 mg, Oral, Q4H PRN, Crystal Andrew, DO, 10 mg at 03/16/23 1857    pantoprazole (PROTONIX) 40 mg in 0.9% sodium chloride 10 mL injection, 40 mg, IntraVENous, DAILY, Walker Buitrago MD, 40 mg at 03/18/23 0913    labetaloL (NORMODYNE;TRANDATE) injection 5 mg, 5 mg, IntraVENous, Q4H PRN, Promise MOLINA MD, 5 mg at 03/09/23 0307    [Held by provider] heparin 25,000 units in D5W 250 ml infusion, 400 Units/hr, IntraVENous, CONTINUOUS, Lizette Linton NP, Stopped at 03/10/23 1350    0.9% sodium chloride infusion, 14 mL/hr, IntraVENous, CONTINUOUS, Promise MOLINA MD, Last Rate: 11 mL/hr at 03/18/23 0748, 11 mL/hr at 03/18/23 0748    albumin human 25% (BUMINATE) solution 25 g, 25 g, IntraVENous, DIALYSIS PRN, Misti Mcclain MD, 25 g at 03/13/23 0539    hydrALAZINE (APRESOLINE) 20 mg/mL injection 5 mg, 5 mg, IntraVENous, Q6H PRN, Sloan Whaley MD, 5 mg at 03/10/23 1316    hydrALAZINE (APRESOLINE) tablet 5 mg, 5 mg, Oral, PRN, Sloan Whaley MD    aspirin chewable tablet 81 mg, 81 mg, Per NG tube, DAILY, Siva Goodiwn MD, 81 mg at 03/18/23 0910    epoetin heidy-epbx (RETACRIT) injection 10,000 Units, 10,000 Units, SubCUTAneous, Q TUE, THU & SAT, Abou-Assi, David Phillip MD, 10,000 Units at 03/16/23 2135    EPINEPHrine (ADRENALIN) 10 mg in 0.9% sodium chloride 250 mL infusion, 0-10 mcg/min, IntraVENous, TITRATE, Megha Johnson MD, Stopped at 03/05/23 0920    NOREPINephrine (LEVOPHED) 8 mg in 5% dextrose 250mL (32 mcg/mL) infusion, 0.5-16 mcg/min, IntraVENous, TITRATE, Jaleesa Guo MD, Last Rate: 9.4 mL/hr at 03/18/23 0949, 5 mcg/min at 03/18/23 0949    [Held by provider] colchicine tablet 0.6 mg, 0.6 mg, Oral, DAILY, Shaila, Lizette B, NP, 0.6 mg at 03/14/23 0848    heparin (porcine) 1,000 unit/mL injection 1,700 Units, 1,700 Units, InterCATHeter, DIALYSIS PRN, 1,700 Units at 03/13/23 0819 **AND** heparin (porcine) 1,000 unit/mL injection 1,500 Units, 1,500 Units, InterCATHeter, DIALYSIS PRN, Tiesha Lopez, DO, 1,500 Units at 03/13/23 0819    balsam peru-castor oiL (VENELEX) ointment, , Topical, BID, Parker Arzola MD, Given at 03/18/23 1009    [Held by provider] acetaminophen (TYLENOL) solution 650 mg, 650 mg, Oral, Q4H PRN, Jaleesa Guo MD, 650 mg at 03/14/23 1924    [Held by provider] acetaminophen (TYLENOL) suppository 650 mg, 650 mg, Rectal, Q4H PRN, Jaleesa Guo MD, 650 mg at 02/17/23 2013    HYDROmorphone (DILAUDID) injection 0.5 mg, 0.5 mg, IntraVENous, Q3H PRN, Radha MOLINA MD, 0.5 mg at 03/18/23 0945    HYDROmorphone (DILAUDID) injection 1 mg, 1 mg, IntraVENous, Q4H PRN, Radha MOLINA MD, 1 mg at 03/14/23 0848    [Held by provider] heparin 25,000 units in D5W 250 ml infusion, 12-25 Units/kg/hr, IntraVENous, TITRATE, Jerry Gross MD, Stopped at 03/08/23 0515    glucose chewable tablet 16 g, 4 Tablet, Oral, PRN, Shaila, Lizette B, NP    glucagon (GLUCAGEN) injection 1 mg, 1 mg, IntraMUSCular, PRN, Shaila, Lizette B, NP    sodium chloride (NS) flush 5-40 mL, 5-40 mL, IntraVENous, Q8H, Shaila, Lizette B, NP, 10 mL at 03/18/23 0538    sodium chloride (NS) flush 5-40 mL, 5-40 mL, IntraVENous, PRN, Shaila, Lizette B, NP, 40 mL at 02/17/23 1818    naloxone (NARCAN) injection 0.4 mg, 0.4 mg, IntraVENous, PRN, Shaila, Lizette B, NP    ELECTROLYTE REPLACEMENT NOTE: Nurse to review Serum Potassium and Magnesuim levels and Initiate Electrolyte Replacement Protocol as needed, 1 Each, Other, PRN, Shaila, Lizette B, NP    dextrose 10 % infusion 0-250 mL, 0-250 mL, IntraVENous, PRN, Shaila, Lizette B, NP, Last Rate: 150 mL/hr at 02/24/23 0220, 75 mL at 02/24/23 0220    [Held by provider] acetaminophen (TYLENOL) tablet 650 mg, 650 mg, Oral, Q6H PRN, Shaila, Lizette B, NP, 650 mg at 03/13/23 2114    ondansetron (ZOFRAN) injection 4 mg, 4 mg, IntraVENous, Q4H PRN, Shaila, Lizette B, NP, 4 mg at 03/09/23 1212    albuterol-ipratropium (DUO-NEB) 2.5 MG-0.5 MG/3 ML, 3 mL, Nebulization, Q6H PRN, Shaila, Lizette B, NP    bisacodyL (DULCOLAX) suppository 10 mg, 10 mg, Rectal, DAILY PRN, Shaila, Lizette B, NP, 10 mg at 02/22/23 0839    0.45% sodium chloride infusion, 10 mL/hr, IntraVENous, CONTINUOUS, Nehemias Heaton MD, Stopped at 03/10/23 1707    dexmedeTOMidine in 0.9 % NaCl (PRECEDEX) 400 mcg/100 mL (4 mcg/mL) infusion soln, 0.1-1.5 mcg/kg/hr, IntraVENous, TITRATE, Oxana Johnson MD, Stopped at 03/08/23 2300     Assessment:       Signed By: Jihan June MD

## 2023-03-18 NOTE — DIALYSIS
[facility] / [id number]    Orders   Mode: CVVH restarted @ 0915   Blood Flow Rate: 200 mL/min   Prismasol Dose: PBP: 800 ml/hr  Replacement 1: 400 ml/hr  Replacement 2: 400 ml/hr   Prismasol Concentrate: 4K / 2.5Ca   Blood Warmer Temp: 37 *C   Net Fluid Removal: 0 ml/hr     Metrics   BP: 111/61   HR: 86   Access Pressure: -50   Filter Pressure: 159   Return Pressure: 75   TMP: 34   Pressure Drop: 45     Access   Type & Location: [facility] non-tunneled CVC   Comments: CHG Transparent dressing CDI and dated 03/17/23. Each catheter limb disinfected for 60 seconds per limb with alcohol swabs and each dialysis CVC hub scrubbed with Prevantics for 15 seconds, followed by a 5 second dry time per Hospital P&P. Labs   HBsAg (Antigen) [place]: Neg (03/13/23)   HBsAb (Antibody) /date: Susceptible (02/18/23)   Source: [employer]     Safety:   Time Out Done:   (Time) 0900   Consent obtained/signed: Verified   Education: CVC precautions   Primary Nurse Rpt Pre: ADRIAN Herman RN   Primary Nurse Rpt Post: ADRIAN Herman RN     Comments / Plan:   Patient, code status, labs, and orders verified. Consent on chart. Time out complete. CVVH restarted d/t air in access line, blood unable to be returned d/t air in circuit. [id number] set-up, primed 1L NS, tested and running well. Lines visible and connections secure with blood warmer supported on return line, set at 37*C. Education & pre/post report to primary RN.

## 2023-03-18 NOTE — PROGRESS NOTES
1930: Bedside and Verbal shift change report given to Tomasa Diamond RN (oncoming nurse) by John Falk RN (offgoing nurse). Report included the following information SBAR, Intake/Output, MAR, Recent Results, and Cardiac Rhythm NSR .      2200: Pt bladder scanned- 158 ml     0630: Hgb 7; Intensivist notified; 1 unit PRBCs given per MD order     0730: Bedside and Verbal shift change report given to John Falk RN (oncoming nurse) by Tomasa Diamond RN (offgoing nurse). Report included the following information SBAR, Intake/Output, MAR, Recent Results, and Cardiac Rhythm NSR .

## 2023-03-19 NOTE — PROGRESS NOTES
0800- Bedside report received. Assessment performed. 1UPRBCS complete. Patient repositioned. 1130- Patient placed on spont. Patient still intermittently alert. TV immediately down to 150-250, -175 with PS 10. Patient placed back on rate. TF set changed    1630- LVAD dressing changed and trach dressing changed. 1700- Patient placed on SBT- immediately having TV of 150-200 with RSI of 120-150.    1730- TMP/ Drop elevated- Rinse back performed. Incontinence care performed. [address] at bedside to change cassette. 2000- Bedside and Verbal shift change report given to [facility] RN (oncoming nurse) by Gary Aponte RN (offgoing nurse). Report given with SBAR, Kardex, Intake/Output and MAR. ROM intact/supple/no JVD

## 2023-03-19 NOTE — DIALYSIS
[facility] / [phone]    Orders   Mode: CVVH restarted @ 1930   Blood Flow Rate: 200 ml/min   Prismasol Dose: PBP: 800 ml/hr  Replacement 1: 400 ml/hr  Replacement 2: 400 ml/hr   Prismasol Concentrate: 4K / 2.5Ca   Blood Warmer Temp: 37*C   Net Fluid Removal: 0 ml/hr     Metrics   BP: 113/58   HR: 86   Access Pressure: -40   Filter Pressure: 128   Return Pressure: 58   TMP: 27   Pressure Drop: 50     Access   Type & Location: [facility] temporary non-tunneled CVC, dressing C/D/I with bio-patch dated 03/17/23. No signs of redness, drainage, or infection visualized. Each catheter limb disinfected for 60 seconds per limb with alcohol swabs. Caps removed, dialysis CVC hub scrubbed with Prevantics for 15 seconds, followed by a 5 second dry time per Hospital P&P. +asp/+flush x 2 ports. Labs   HBsAg (Antigen) / date: Negative  03/13/23                                      HBsAb (Antibody) / date: Susceptible  03/13/23   Source: Epic     Safety:   Time Out Done:   (Time) 1920   Consent obtained/signed: Verified   Education: Access/Site Care   Primary Nurse Rpt Pre: LORI Dooley   Primary Nurse Rpt Post: LORI Dooley     Comments / Plan:   Ana Bertrand RN at bedside to change filter d/t \"pt clotted. \" Patient, code status, labs, and orders verified. Consents on chart. Time out completed. Primary RN able to previously return pt blood from circuit (186 mls.) Old set discarded in red biohazard bin. HF-1000 filter set-up, primed w/ 1L NS, tested and running well. Lines visible and connections secure with blood warmer to return line at 37*C.  Education, pre and post to primary RN.    /

## 2023-03-19 NOTE — OP NOTES
[address]   OPERATIVE REPORT    Name:  Juli Mishra  MR#:  [id number]  :  1950  ACCOUNT #:  [de-identified]  DATE OF SERVICE:  2023    PREOPERATIVE DIAGNOSIS:  Previous placement of Impella RP, right heart assist device, for right ventricular failure. POSTOPERATIVE DIAGNOSIS:  Previous placement of Impella RP, right heart assist device, for right ventricular failure. PROCEDURES PERFORMED:  Impella RP removal.    SURGEON:  Winsome Toledo MD    ASSISTANT:  None. ANESTHESIA:  Propofol. COMPLICATIONS:  None. SPECIMENS REMOVED:  None. IMPLANTS:  None. ESTIMATED BLOOD LOSS:  5 mL. PROCEDURE:  The patient is a very pleasant 60-year-old gentleman who recently had an Impella RP placed as a right ventricular assist device, now having it removed. The patient was on Propofol at that time. We placed a pursestring suture around the cannula device and removed it and tied down the suture. The patient tolerated the procedure well.   I was present for the entire procedure.; ; PA-C assistance was needed due to difficulty of procedure, dissection, and identification of pertinent anatomy throughout the case         Michelle Spangler MD      [id number]  D:  2023 10:57  T:  2023 21:55  JOB #:  [id number]

## 2023-03-19 NOTE — PROGRESS NOTES
[facility] VAD/Heart Failure Progress Note    Admit Date: 2023  POD:  29 Days Post-Op    Procedure:  Procedure(s):  LEFT GROIN RP IMPELLA INSERTION, KIARRA BY DR Frank Sheppard        Subjective:   SBT for 30 minutes yesterday; more alert      Objective:   Vitals:  Blood pressure 97/76, pulse 88, temperature 98.4 °F (36.9 °C), resp. rate 16, height 5' 6\" (1.676 m), weight 136 lb 14.5 oz (62.1 kg), SpO2 100 %. Temp (24hrs), Av.4 °F (36.9 °C), Min:97 °F (36.1 °C), Max:99.3 °F (37.4 °C)    Hemodynamics:   CO: CO (l/min): 4.8 l/min   CI: CI (l/min/m2): 2.7 l/min/m2   CVP: CVP (mmHg): 11 mmHg (23 1200)   SVR: SVR (dyne*sec)/cm5: 1106 (dyne*sec)/cm5 (03/10/23 7644)   PAP Systolic: PAP Systolic: 44 (38/22/73 9991)   PAP Diastolic: PAP Diastolic: 23 (65/16/48 7704)   PVR:     SV02: SVO2 (%): 72 % (03/10/23 0900)   SCV02:      VAD Interrogation: LVAD (Heartmate)  Pump Speed (RPM): 4700  Pump Flow (LPM): 3.6  PI (Pulsitility Index): 5.4  Power: 3  MAP: 82   Test: No  Back Up  at Bedside & Labeled: Yes  Power Module Test: No  Driveline Site Care: No  Driveline Dressing: Clean, Dry, and Intact    EKG/Rhythm:      Extubation Date / Time:     CT Output:     Ventilator:  Ventilator Volumes  Vt Set (ml): 350 ml (23 0835)  Vt Exhaled (Machine Breath) (ml): 372 ml (23 0835)  Vt Spont (ml): 122 ml (23 1410)  Ve Observed (l/min): 7.93 l/min (23 0835)    Oxygen Therapy:  Oxygen Therapy  O2 Sat (%): 100 % (23)  Pulse via Oximetry:  (assessment unchanged) (23)  O2 Device: Tracheostomy; Ventilator (23)  Skin Assessment: Clean, dry, & intact (23)  Skin Protection for O2 Device: Yes (23)  Orientation: Middle (23)  Location: Neck (23)  Interventions: Mouth Care (23)  O2 Flow Rate (L/min): 20 l/min (23)  O2 Temperature: 104 °F (40 °C) (23)  FIO2 (%): 40 % (03/19/23 1200)  ETCO2 (mmHg): 34 mmHg (03/19/23 1200)    CXR:    Admission Weight: Last Weight   Weight: 120 lb (54.4 kg) Weight: 136 lb 14.5 oz (62.1 kg)     Intake / Output / Drain:  Current Shift: 03/19 0701 - 03/19 1900  In: 905.5 [I.V.:255.5]  Out: 391.1   Last 24 hrs.:   Intake/Output Summary (Last 24 hours) at 3/19/2023 1342  Last data filed at 3/19/2023 1300  Gross per 24 hour   Intake 2021.62 ml   Output 1533.6 ml   Net 488.02 ml             No results for input(s): CPK, CKMB, TROIQ in the last 72 hours.   Recent Labs     03/19/23 0212 03/18/23 2229 03/18/23 0317 03/17/23 0210    136 137 134*   K 4.3 4.4 4.3 4.2   CO2 26 26 24 25   BUN 26* 29* 28* 40*   CREA 1.74* 1.82* 2.06* 2.23*   * 145* 81 83   PHOS 2.1*  --  2.3* 2.4*   MG 2.9* 2.9* 3.0* 3.0*   WBC 6.6  --  7.0 7.7   HGB 7.0*  --  7.6* 7.9*   HCT 21.9*  --  23.2* 23.9*     --  244 252     Recent Labs     03/19/23 0212 03/18/23  0317 03/17/23  0210   INR 1.8* 1.9* 1.7*   PTP 17.7* 19.0* 17.1*     No lab exists for component: PBNP        Current Facility-Administered Medications:     0.9% sodium chloride infusion 250 mL, 250 mL, IntraVENous, PRN, Teagan Coffman MD    sodium phosphate 20 mmol in dextrose 5% 250 mL infusion, , IntraVENous, ONCE, Jadiel Galvan MD    cinacalcet (SENSIPAR) tablet 90 mg, 90 mg, Oral, BID WITH MEALS, Jadiel Galvan MD, 90 mg at 03/19/23 0817    insulin lispro (HUMALOG) injection, , SubCUTAneous, Q4H, Cathy Sheldon, SLICK    bicarbonate dialysis (PRISMASOL) BG K 4/Ca 2.5 5000 ml solution, , Extracorporeal, DIALYSIS CONTINUOUS, Jadiel Galvan MD, Last Rate: 1,600 mL/hr at 03/19/23 1130, New Bag at 03/19/23 1130    levETIRAcetam (KEPPRA) injection 500 mg, 500 mg, IntraVENous, Q12H, Rubina Fishman MD, 500 mg at 03/19/23 0817    [COMPLETED] meropenem (MERREM) 1 g in 0.9% sodium chloride 20 mL IV syringe, 1 g, IntraVENous, NOW, 1 g at 03/15/23 6924 **FOLLOWED BY** meropenem (MERREM) 1 g in 0.9% sodium chloride (MBP/ADV) 50 mL MBP, 1 g, IntraVENous, Q12H, Jonah Resendez MD, Last Rate: 16.7 mL/hr at 03/19/23 0542, 1 g at 03/19/23 0542    [Held by provider] insulin NPH (NOVOLIN N, HUMULIN N) injection 10 Units, 10 Units, SubCUTAneous, Q12H, Cathy Sheldon, SLICK, 10 Units at 03/15/23 2112    albumin human 25% (BUMINATE) solution 12.5 g, 12.5 g, IntraVENous, Q12H, Lizette Linton NP, 12.5 g at 03/19/23 0817    oxyCODONE IR (ROXICODONE) tablet 5 mg, 5 mg, Oral, Q4H PRN, Firman Formosa G, DO, 5 mg at 03/19/23 0817    oxyCODONE IR (ROXICODONE) tablet 10 mg, 10 mg, Oral, Q4H PRN, Marianne Velazqueze, DO, 10 mg at 03/18/23 2206    pantoprazole (PROTONIX) 40 mg in 0.9% sodium chloride 10 mL injection, 40 mg, IntraVENous, DAILY, Walker Morgan MD, 40 mg at 03/19/23 0817    labetaloL (NORMODYNE;TRANDATE) injection 5 mg, 5 mg, IntraVENous, Q4H PRN, Beto MOLINA MD, 5 mg at 03/09/23 0307    [Held by provider] heparin 25,000 units in D5W 250 ml infusion, 400 Units/hr, IntraVENous, CONTINUOUS, Lizette Linton NP, Stopped at 03/10/23 1350    0.9% sodium chloride infusion, 14 mL/hr, IntraVENous, CONTINUOUS, Beto MOLINA MD, Last Rate: 11 mL/hr at 03/19/23 0741, 11 mL/hr at 03/19/23 0741    albumin human 25% (BUMINATE) solution 25 g, 25 g, IntraVENous, DIALYSIS PRN, Joann Gregory MD, 25 g at 03/13/23 0539    hydrALAZINE (APRESOLINE) 20 mg/mL injection 5 mg, 5 mg, IntraVENous, Q6H PRN, Margaret Ng MD, 5 mg at 03/10/23 1316    hydrALAZINE (APRESOLINE) tablet 5 mg, 5 mg, Oral, PRN, Margaret Ng MD    aspirin chewable tablet 81 mg, 81 mg, Per NG tube, DAILY, Violet Granados MD, 81 mg at 03/19/23 0817    epoetin heidy-epbx (RETACRIT) injection 10,000 Units, 10,000 Units, SubCUTAneous, Q TUE, THU & SAT, Abou-Assi, Stephenie Castañeda MD, 10,000 Units at 03/18/23 2108    EPINEPHrine (ADRENALIN) 10 mg in 0.9% sodium chloride 250 mL infusion, 0-10 mcg/min, IntraVENous, TITRATE, Kita Johnson MD, Stopped at 03/05/23 0920    NOREPINephrine (LEVOPHED) 8 mg in 5% dextrose 250mL (32 mcg/mL) infusion, 0.5-16 mcg/min, IntraVENous, TITRATE, Samia Johnson MD, Last Rate: 3.8 mL/hr at 03/19/23 0741, 2 mcg/min at 03/19/23 0741    [Held by provider] colchicine tablet 0.6 mg, 0.6 mg, Oral, DAILY, Lizette Linton, NP, 0.6 mg at 03/14/23 0848    heparin (porcine) 1,000 unit/mL injection 1,700 Units, 1,700 Units, InterCATHeter, DIALYSIS PRN, 1,700 Units at 03/13/23 0819 **AND** heparin (porcine) 1,000 unit/mL injection 1,500 Units, 1,500 Units, InterCATHeter, DIALYSIS PRN, Doraine Heide DO, 1,500 Units at 03/13/23 0819    balsam peru-castor oiL (VENELEX) ointment, , Topical, BID, Duane Sousa, MD, Given at 03/19/23 0905    [Held by provider] acetaminophen (TYLENOL) solution 650 mg, 650 mg, Oral, Q4H PRN, Iva Saavedra MD, 650 mg at 03/14/23 1924    [Held by provider] acetaminophen (TYLENOL) suppository 650 mg, 650 mg, Rectal, Q4H PRN, Iva Saavedra MD, 650 mg at 02/17/23 2013    HYDROmorphone (DILAUDID) injection 0.5 mg, 0.5 mg, IntraVENous, Q3H PRN, Estiven MOLINA MD, 0.5 mg at 03/19/23 0543    HYDROmorphone (DILAUDID) injection 1 mg, 1 mg, IntraVENous, Q4H PRN, Estiven MOLINA MD, 1 mg at 03/14/23 0848    [Held by provider] heparin 25,000 units in D5W 250 ml infusion, 12-25 Units/kg/hr, IntraVENous, TITRATE, Zia Lee MD, Stopped at 03/08/23 0515    glucose chewable tablet 16 g, 4 Tablet, Oral, PRN, Lalo Lintonin B, NP    glucagon (GLUCAGEN) injection 1 mg, 1 mg, IntraMUSCular, PRN, Shaila, Lizette B, NP    sodium chloride (NS) flush 5-40 mL, 5-40 mL, IntraVENous, Q8H, Shaila, Lizette B, NP, 10 mL at 03/19/23 0511    sodium chloride (NS) flush 5-40 mL, 5-40 mL, IntraVENous, PRN, Shaila, Lizette B, NP, 40 mL at 02/17/23 1818    naloxone (NARCAN) injection 0.4 mg, 0.4 mg, IntraVENous, PRN, Shaila, Lizette B, NP    ELECTROLYTE REPLACEMENT NOTE: Nurse to review Serum Potassium and Magnesuim levels and Initiate Electrolyte Replacement Protocol as needed, 1 Each, Other, PRN, Shaila, Lizette B, NP    dextrose 10 % infusion 0-250 mL, 0-250 mL, IntraVENous, PRN, Shaila, Lizette B, NP, Last Rate: 150 mL/hr at 02/24/23 0220, 75 mL at 02/24/23 0220    [Held by provider] acetaminophen (TYLENOL) tablet 650 mg, 650 mg, Oral, Q6H PRN, Shaila, Lizette B, NP, 650 mg at 03/13/23 2114    ondansetron (ZOFRAN) injection 4 mg, 4 mg, IntraVENous, Q4H PRN, Shaila, Lizette B, NP, 4 mg at 03/09/23 1212    albuterol-ipratropium (DUO-NEB) 2.5 MG-0.5 MG/3 ML, 3 mL, Nebulization, Q6H PRN, Shaila, Lizette B, NP    bisacodyL (DULCOLAX) suppository 10 mg, 10 mg, Rectal, DAILY PRN, Shaila, Lizette B, NP, 10 mg at 02/22/23 0839    0.45% sodium chloride infusion, 10 mL/hr, IntraVENous, CONTINUOUS, Bobbette Sicard, MD, Stopped at 03/10/23 1707    dexmedeTOMidine in 0.9 % NaCl (PRECEDEX) 400 mcg/100 mL (4 mcg/mL) infusion soln, 0.1-1.5 mcg/kg/hr, IntraVENous, TITRATE, FiserHolland MD, Stopped at 03/08/23 2300      4.      Signed By: Re Machado MD

## 2023-03-19 NOTE — DIALYSIS
[facility] / [phone]    Orders   Mode: CVVH rounding   Blood Flow Rate: 200 ml/min   Prismasol Dose: PBP: 800 ml/hr  Replacement 1: 400 ml/hr  Replacement 2: 400 ml/hr   Prismasol Concentrate: 4K / 2.5Ca   Blood Warmer Temp: 37*c   Net Fluid Removal: 0 ml/hr      Metrics   BP: 116/62   HR: 86   Access Pressure: -44   Filter Pressure: 128   Return Pressure: 37   TMP: 81   Pressure Drop: 51     Access   Type & Location: [facility] non-tunneled CVC C/D/I with transparent dressing and biopatch in place dated 03/17/23. Labs   HBsAg (Antigen) / date: Negative  03/13/23                                              HBsAb (Antibody) / date: Susceptible  03/13/23   Source: [facility]     Safety:   Time Out Done:   (Time) 0605   Consent obtained/signed: Verified   Education: Access/Site Care   Primary Nurse Rpt Pre: Angel Rao RN   Primary Nurse Rpt Post: Angel Rao RN     Comments / Plan:   Patient, orders, line and consent verified. Labs, notes and code status reviewed. [id number] filter running well with no indications for change at this time. Treatment running with bloodlines reversed at present. Lines visible/secure with blood warmer attached to the return line and set to 37C*.

## 2023-03-19 NOTE — PROGRESS NOTES
CRITICAL CARE NOTE      Name: Anna Webber   : 1950   MRN: [id number]   Date: 3/19/2023      Reason for ICU Admission: Acute on chronic HFrEF     ICU PROBLEM LIST   Acute on chronic HFrEF (20%) NYHA IIIb/IV (D) on home inotropic support, life vest s/p LVAD  TANNER on CKD3, on CRRT  CHB post op, resolved  Aflutter w/ RVR  Acute on chronic hypoxemic respiratory failure, s/p trach placement  CAP  Gastric neuroendocrine tumor  Hx of LUE DVT  DM  PAD  TRISTAN  BPH w/ urinary retention requiring chronic donnelly    HISTORY OF PRESENT ILLNESS:   In brief, A 71year old male PMH as noted above admitted to [facility] on  due to ongoing symptoms secondary to his HFrEF. Treated for possible CAP, and diuresed for acute on chronic HFrEF. Nephrology following for TANNER on CKD 3. AHF team recommended transfer to CVICU for [facility] placement and ongoing LVAD workup, with placement 2/15. Pt extubated , however with development of worsening renal dysfunction overnight and unable to tolerate CRRT so was reintubated and taken for Impella RP placement for right-sided support in a.m. of  and CRRT resumed. Impella removed on 3/1. Overnight 3/1-3/1 CVA noted with SAH.  3/2 SAH enlarged. Held anticoagulation. Failed extubation 3/2. Trach placed 3/7. Transitioned to [facility]. Repeat CTH 3/10 w/ persistent SAH, heparin held. 24 HOUR EVENTS:   No acute events overnight  Daily SBT    ASSESSMENT AND PLANS:   In brief, A 71year old male PMH as noted above admitted to [facility] on  due to ongoing symptoms secondary to his HFrEF with SAH, s/p trach, dialysis.       NEUROLOGICAL: Acute encephalopathy, critical illness polyneuropathy   -follows commands  -alert  -moves all extremities  PULMONOLOGY: Acute proximal respiratory failure, status post tracheostomy 2023   -Continue to monitor oxygen saturations  -Continue mechanical ventilation  -Continue wean ventilator as tolerated  -Daily SBT    -45 minutes this am  -Pulmonary hygiene    CARDIOVASCULAR: Ischemic and nonischemic cardiomyopathy, left ventricular ejection fraction 25 to 30%, status post LVAD 15 February 2023, status post Impella right side removed 1 March 2023, cardiorenal syndrome   -Continue monitor hemodynamic status  -Continue aspirin    GASTROINTESTINAL: Hepatic congestion, hyperbilirubinemia, possible cirrhosis, pancreatitis   -Continue enteral feeds  -Continue GI prophylaxis  -lipase in process    RENAL/ELECTROLYTE/FLUIDS: Acute on chronic renal failure   -Continue hemodialysis with continuous renal replacement therapy  -Strict I's and O's  -Place electrolytes as indicated  -Nephrology following    -CRRT  -BUN 26  -Creatinine 1.74    ENDOCRINE:   -Continue to monitor blood glucose levels   -Maintain blood glucose levels between 140-180   -Sliding scale insulin   HEMATOLOGY/ONCOLOGY: Acute on chronic anemia, gastric neuroendocrine tumor   -Hbg 7.0  -transfused 1 unit prbc's  -Plt 222  ID/MICRO:   -WBC 6.6  -No cultures since 9 March 2023  ICU DAILY CHECKLIST   Code Status:Full  DVT Prophylaxis: SCDs  SUP: PPI  Diet: TF  Activity Level:ad jose f  ABCDEF Bundle/Checklist Completed:Yes  Disposition: Stay in ICU  Multidisciplinary Rounds Completed:  yes  Patient/Family Updated: [address]   2/3 Transferred to CVICU for [facility] placement  2/7 started on dobutamine as adjunct to milrinone  2/15 LVAD implant  2/16 extubated  2/18 Impella RP placement  3/1 Impella removed  3/1-2 SAH on CT  3/3 RE-intubated  3/6 Blakes removed, transitioned to HD  3/7 Trach placment  3/10 persistent SAH, heparin stopped  3/11 HD catheter replaced    SUBJECTIVE:     As noted above    Review of Systems:     Unable to perform due to patient trached and mental status    OBJECTIVE:     Labs and Data: Reviewed 03/19/23  Medications: Reviewed 03/19/23  Imaging: Reviewed 03/19/23    General - unresponsive, chronically ill appearing  HEENT- pupils equal, no nystagmus, jaundiced sclerea  Neuro - unresponsive  CV - Paced,  post sternotomy  Resp - trached, good airflow bilateral without wheezing or rales. Decreased at bases  Abd - soft, not distended, nontender  MSK- intact, no sacral ulcer, anasarca  SKIN: juandiced    Visit Vitals  BP 97/76   Pulse 88   Temp 98.4 °F (36.9 °C)   Resp 16   Ht 5' 6\" (1.676 m)   Wt 62.1 kg (136 lb 14.5 oz)   SpO2 100%   BMI 22.10 kg/m²    O2 Flow Rate (L/min): 20 l/min O2 Device: Tracheostomy, Ventilator Temp (24hrs), Av.4 °F (36.9 °C), Min:97 °F (36.1 °C), Max:99.3 °F (37.4 °C)    CVP (mmHg): 11 mmHg (23 1200)      Intake/Output:     Intake/Output Summary (Last 24 hours) at 3/19/2023 1401  Last data filed at 3/19/2023 1300  Gross per 24 hour   Intake 1981.22 ml   Output 1500.1 ml   Net 481.12 ml     Imaging  All images and labs were reviewed by me personally. 23    ECHO ADULT FOLLOW-UP OR LIMITED 2023    Interpretation Summary    Left Ventricle: EF by visual approximation is 20%. Left ventricle is mildly dilated. Mitral Valve: Moderately thickened leaflet, at the anterior and posterior leaflets. Moderately calcified leaflet. Moderate regurgitation. Left Atrium: Left atrium is moderately dilated. Technical qualifiers: Echo study was technically difficult with poor endocardial visualization. Contrast used: Definity. Limited study, not all structures viewed    Signed by: Kishore Rosas MD on 2023  4:39 PM       Pertinent imaging reviewed and interpreted as noted above    CRITICAL CARE DOCUMENTATION  I had a face to face encounter with the patient, reviewed and interpreted patient data including clinical events, labs, images, vital signs, I/O's, and examined patient.   I have discussed the case and the plan and management of the patient's care with the consulting services, the bedside nurses and the respiratory therapist.      NOTE OF PERSONAL INVOLVEMENT IN CARE   This patient has a high probability of imminent, clinically significant deterioration, which requires the highest level of preparedness to intervene urgently. I participated in the decision-making and personally managed or directed the management of the following life and organ supporting interventions that required my frequent assessment to treat or prevent imminent deterioration. I personally spent 40 minutes of critical care time. This is time spent at this critically ill patient's bedside actively involved in patient care as well as the coordination of care. This does not include any procedural time which has been billed separately.     Deepali Owen MD  Critical Care Medicine  [facility] Critical [facility]

## 2023-03-19 NOTE — PROGRESS NOTES
Problem: Diabetes Self-Management  Goal: *Disease process and treatment process  Description: Define diabetes and identify own type of diabetes; list 3 options for treating diabetes. Outcome: Progressing Towards Goal  Goal: *Incorporating nutritional management into lifestyle  Description: Describe effect of type, amount and timing of food on blood glucose; list 3 methods for planning meals. Outcome: Progressing Towards Goal  Goal: *Incorporating physical activity into lifestyle  Description: State effect of exercise on blood glucose levels. Outcome: Progressing Towards Goal  Goal: *Developing strategies to promote health/change behavior  Description: Define the ABC's of diabetes; identify appropriate screenings, schedule and personal plan for screenings. Outcome: Progressing Towards Goal  Goal: *Using medications safely  Description: State effect of diabetes medications on diabetes; name diabetes medication taking, action and side effects. Outcome: Progressing Towards Goal  Goal: *Monitoring blood glucose, interpreting and using results  Description: Identify recommended blood glucose targets  and personal targets. Outcome: Progressing Towards Goal  Goal: *Prevention, detection, treatment of acute complications  Description: List symptoms of hyper- and hypoglycemia; describe how to treat low blood sugar and actions for lowering  high blood glucose level. Outcome: Progressing Towards Goal  Goal: *Prevention, detection and treatment of chronic complications  Description: Define the natural course of diabetes and describe the relationship of blood glucose levels to long term complications of diabetes.   Outcome: Progressing Towards Goal  Goal: *Developing strategies to address psychosocial issues  Description: Describe feelings about living with diabetes; identify support needed and support network  Outcome: Progressing Towards Goal  Goal: *Insulin pump training  Outcome: Progressing Towards Goal  Goal: *Sick day guidelines  Outcome: Progressing Towards Goal  Goal: *Patient Specific Goal (EDIT GOAL, INSERT TEXT)  Outcome: Progressing Towards Goal     Problem: Patient Education: Go to Patient Education Activity  Goal: Patient/Family Education  Outcome: Progressing Towards Goal     Problem: Pressure Injury - Risk of  Goal: *Prevention of pressure injury  Description: Document Mike Scale and appropriate interventions in the flowsheet. Outcome: Progressing Towards Goal  Note: Pressure Injury Interventions:  Sensory Interventions: Assess need for specialty bed    Moisture Interventions: Apply protective barrier, creams and emollients    Activity Interventions: PT/OT evaluation    Mobility Interventions: Pressure redistribution bed/mattress (bed type), PT/OT evaluation    Nutrition Interventions: Document food/fluid/supplement intake, Discuss nutritional consult with provider    Friction and Shear Interventions: Apply protective barrier, creams and emollients                Problem: Patient Education: Go to Patient Education Activity  Goal: Patient/Family Education  Outcome: Progressing Towards Goal     Problem: Falls - Risk of  Goal: *Absence of Falls  Description: Document Laverne Fall Risk and appropriate interventions in the flowsheet.   Outcome: Progressing Towards Goal  Note: Fall Risk Interventions:  Mobility Interventions: PT Consult for assist device competence, PT Consult for mobility concerns, OT consult for ADLs         Medication Interventions: Teach patient to arise slowly, Bed/chair exit alarm    Elimination Interventions: Call light in reach    History of Falls Interventions: Door open when patient unattended, Consult care management for discharge planning         Problem: Patient Education: Go to Patient Education Activity  Goal: Patient/Family Education  Outcome: Progressing Towards Goal     Problem: Pain  Goal: *Control of Pain  Outcome: Progressing Towards Goal  Goal: *PALLIATIVE CARE:  Alleviation of Pain  Outcome: Progressing Towards Goal     Problem: Patient Education: Go to Patient Education Activity  Goal: Patient/Family Education  Outcome: Progressing Towards Goal     Problem: Patient Education: Go to Patient Education Activity  Goal: Patient/Family Education  Outcome: Progressing Towards Goal     Problem: Patient Education: Go to Patient Education Activity  Goal: Patient/Family Education  Outcome: Progressing Towards Goal     Problem: Nutrition Deficit  Goal: *Optimize nutritional status  Outcome: Progressing Towards Goal     Problem: Ventilator Management  Goal: *Adequate oxygenation and ventilation  Outcome: Progressing Towards Goal  Goal: *Patient maintains clear airway/free of aspiration  Outcome: Progressing Towards Goal  Goal: *Absence of infection signs and symptoms  Outcome: Progressing Towards Goal  Goal: *Normal spontaneous ventilation  Outcome: Progressing Towards Goal     Problem: Patient Education: Go to Patient Education Activity  Goal: Patient/Family Education  Outcome: Progressing Towards Goal     Problem: Patient Education: Go to Patient Education Activity  Goal: Patient/Family Education  Outcome: Progressing Towards Goal     Problem: LVAD Pre-op Period  Goal: Off Pathway (Use only if patient is Off Pathway)  Outcome: Progressing Towards Goal  Goal: Activity/Safety  Outcome: Progressing Towards Goal  Goal: Consults, if ordered  Outcome: Progressing Towards Goal  Goal: Diagnostic Test/Procedures  Outcome: Progressing Towards Goal  Goal: Nutrition/Diet  Outcome: Progressing Towards Goal  Goal: Medications  Outcome: Progressing Towards Goal  Goal: Treatments/Interventions/Procedures  Outcome: Progressing Towards Goal  Goal: Discharge Planning  Outcome: Progressing Towards Goal  Goal: Psychosocial  Outcome: Progressing Towards Goal  Goal: *Hemodynamically stable (eg: Cardiac output/index; pulmonary arterial pressures; mixed venous oxygen saturation within set parameters)  Outcome: Progressing Towards Goal  Goal: *Labs/tests completed  Outcome: Progressing Towards Goal     Problem: LVAD Pre-Op Day  Goal: Off Pathway (Use only if patient is Off Pathway)  Outcome: Progressing Towards Goal  Goal: Activity/Safety  Outcome: Progressing Towards Goal  Goal: Consults, if ordered  Outcome: Progressing Towards Goal  Goal: Diagnostic Test/Procedures  Outcome: Progressing Towards Goal  Goal: Nutrition/Diet  Outcome: Progressing Towards Goal  Goal: Medications  Outcome: Progressing Towards Goal  Goal: Treatments/Interventions/Procedures  Outcome: Progressing Towards Goal  Goal: Discharge Planning  Outcome: Progressing Towards Goal  Goal: Psychosocial  Outcome: Progressing Towards Goal  Goal: *Vital signs within specified parameters  Outcome: Progressing Towards Goal  Goal: *Consent obtained, permits and diagnostics complete  Outcome: Progressing Towards Goal  Goal: *Confirmation of post discharge primary support person(s)  Outcome: Progressing Towards Goal     Problem: LVAD Day of Surgery (Initiate SCIP measure for post-op care)  Goal: Off Pathway (Use only if patient is Off Pathway)  Outcome: Progressing Towards Goal  Goal: Activity/Safety  Outcome: Progressing Towards Goal  Goal: Consults, if ordered  Outcome: Progressing Towards Goal  Goal: Diagnostic Test/Procedures  Outcome: Progressing Towards Goal  Goal: Nutrition/Diet  Outcome: Progressing Towards Goal  Goal: Medications  Outcome: Progressing Towards Goal  Goal: Respiratory  Outcome: Progressing Towards Goal  Goal: Treatments/Interventions/Procedures  Outcome: Progressing Towards Goal  Goal: Psychosocial  Outcome: Progressing Towards Goal  Goal: *Hemodynamically stable (eg: Cardiac output/index; pulmonary arterial pressures; mixed venous oxygen saturation within set parameters)  Outcome: Progressing Towards Goal  Goal: *Lungs clear or at baseline  Outcome: Progressing Towards Goal  Goal: *Stable cardiac rhythm  Outcome: Progressing Towards Goal  Goal: *Follows simple commands post anesthesia  Outcome: Progressing Towards Goal  Goal: *Optimal pain control at patient's stated goal  Outcome: Progressing Towards Goal  Goal: *LVAD parameters within set limits  Outcome: Progressing Towards Goal     Problem: Nutrition Deficit  Goal: *Optimize nutritional status  Outcome: Progressing Towards Goal

## 2023-03-19 NOTE — PROGRESS NOTES
Name: Catherine Cardoza   MRN: [id number]  : 1950      Assessment:  TANNER on CKD-3a: Suspect cardiorenal effects initially. Now has LVAD and  POST OP ATN. Anuric->Requiring CRRT>>iHD-> back to CRRT 3/15/23. left IJ Hermelindo catheter was last replaced on 3/11/2023 by intensivist    hypercalcemia/immobilization- also has elevated PTH; may have component of 1ry. Ca improving but not at goal. On BID dosing of Sensipar-> titrating up dose  Elevated LFTs/Hyperbilirubinemia  Possible pancreatitis- with elevated lipase; Hypercalcemia can contribute  Ischemic CM: Recurrent exacerbations. EF 20%. S/p LVAD on 2/15. 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 Factor rate at 0cc/hr  Weaning Levophed  IV NaPhos 20mmol x1 dose  INcrease Sensipar to 90mg BID yesterday  Holding Colchicine  BRIGHT  AM labs      Subjective:  Seen-> remains on CVVH. Low dose Levophed still.  Awake    ROS:   UTO    Exam:  Visit Vitals  BP 97/76   Pulse 88   Temp 98.4 °F (36.9 °C)   Resp 16   Ht 5' 6\" (1.676 m)   Wt 62.1 kg (136 lb 14.5 oz)   SpO2 100%   BMI 22.10 kg/m²     NAD/Frail  NGT  +icterus  +trach  Abd distension  Tr edema-improved  + Jaundice    Current Facility-Administered Medications   Medication Dose Route Frequency Last Admin    0.9% sodium chloride infusion 250 mL  250 mL IntraVENous PRN      cinacalcet (SENSIPAR) tablet 90 mg  90 mg Oral BID WITH MEALS 90 mg at 23 0817    insulin lispro (HUMALOG) injection   SubCUTAneous Q4H      bicarbonate dialysis (PRISMASOL) BG K 4/Ca 2.5 5000 ml solution   Extracorporeal DIALYSIS CONTINUOUS New Bag at 23 1130 levETIRAcetam (KEPPRA) injection 500 mg  500 mg IntraVENous Q12H 500 mg at 03/19/23 0817    meropenem (MERREM) 1 g in 0.9% sodium chloride (MBP/ADV) 50 mL MBP  1 g IntraVENous Q12H 1 g at 03/19/23 0542    [Held by provider] insulin NPH (NOVOLIN N, HUMULIN N) injection 10 Units  10 Units SubCUTAneous Q12H 10 Units at 03/15/23 2112    albumin human 25% (BUMINATE) solution 12.5 g  12.5 g IntraVENous Q12H 12.5 g at 03/19/23 0817    oxyCODONE IR (ROXICODONE) tablet 5 mg  5 mg Oral Q4H PRN 5 mg at 03/19/23 0817    oxyCODONE IR (ROXICODONE) tablet 10 mg  10 mg Oral Q4H PRN 10 mg at 03/18/23 2206    pantoprazole (PROTONIX) 40 mg in 0.9% sodium chloride 10 mL injection  40 mg IntraVENous DAILY 40 mg at 03/19/23 0817    labetaloL (NORMODYNE;TRANDATE) injection 5 mg  5 mg IntraVENous Q4H PRN 5 mg at 03/09/23 0307    [Held by provider] heparin 25,000 units in D5W 250 ml infusion  400 Units/hr IntraVENous CONTINUOUS Stopped at 03/10/23 1350    0.9% sodium chloride infusion  14 mL/hr IntraVENous CONTINUOUS 11 mL/hr at 03/19/23 0741    albumin human 25% (BUMINATE) solution 25 g  25 g IntraVENous DIALYSIS PRN 25 g at 03/13/23 0539    hydrALAZINE (APRESOLINE) 20 mg/mL injection 5 mg  5 mg IntraVENous Q6H PRN 5 mg at 03/10/23 1316    hydrALAZINE (APRESOLINE) tablet 5 mg  5 mg Oral PRN      aspirin chewable tablet 81 mg  81 mg Per NG tube DAILY 81 mg at 03/19/23 0817    epoetin heidy-epbx (RETACRIT) injection 10,000 Units  10,000 Units SubCUTAneous Q TUE, THU & SAT 10,000 Units at 03/18/23 2108    EPINEPHrine (ADRENALIN) 10 mg in 0.9% sodium chloride 250 mL infusion  0-10 mcg/min IntraVENous TITRATE Stopped at 03/05/23 0920    NOREPINephrine (LEVOPHED) 8 mg in 5% dextrose 250mL (32 mcg/mL) infusion  0.5-16 mcg/min IntraVENous TITRATE 2 mcg/min at 03/19/23 0741    [Held by provider] colchicine tablet 0.6 mg  0.6 mg Oral DAILY 0.6 mg at 03/14/23 0848    heparin (porcine) 1,000 unit/mL injection 1,700 Units  1,700 Units InterCATHeter DIALYSIS PRN 1,700 Units at 03/13/23 0819    And    heparin (porcine) 1,000 unit/mL injection 1,500 Units  1,500 Units InterCATHeter DIALYSIS PRN 1,500 Units at 03/13/23 0819    balsam peru-castor oiL (VENELEX) ointment   Topical BID Given at 03/19/23 0905    [Held by provider] acetaminophen (TYLENOL) solution 650 mg  650 mg Oral Q4H  mg at 03/14/23 1924    [Held by provider] acetaminophen (TYLENOL) suppository 650 mg  650 mg Rectal Q4H  mg at 02/17/23 2013    HYDROmorphone (DILAUDID) injection 0.5 mg  0.5 mg IntraVENous Q3H PRN 0.5 mg at 03/19/23 0543    HYDROmorphone (DILAUDID) injection 1 mg  1 mg IntraVENous Q4H PRN 1 mg at 03/14/23 0848    [Held by provider] heparin 25,000 units in D5W 250 ml infusion  12-25 Units/kg/hr IntraVENous TITRATE Stopped at 03/08/23 0515    glucose chewable tablet 16 g  4 Tablet Oral PRN      glucagon (GLUCAGEN) injection 1 mg  1 mg IntraMUSCular PRN      sodium chloride (NS) flush 5-40 mL  5-40 mL IntraVENous Q8H 10 mL at 03/19/23 0511    sodium chloride (NS) flush 5-40 mL  5-40 mL IntraVENous PRN 40 mL at 02/17/23 1818    naloxone (NARCAN) injection 0.4 mg  0.4 mg IntraVENous PRN      ELECTROLYTE REPLACEMENT NOTE: Nurse to review Serum Potassium and Magnesuim levels and Initiate Electrolyte Replacement Protocol as needed  1 Each Other PRN      dextrose 10 % infusion 0-250 mL  0-250 mL IntraVENous PRN 75 mL at 02/24/23 0220    [Held by provider] acetaminophen (TYLENOL) tablet 650 mg  650 mg Oral Q6H  mg at 03/13/23 2114    ondansetron (ZOFRAN) injection 4 mg  4 mg IntraVENous Q4H PRN 4 mg at 03/09/23 1212    albuterol-ipratropium (DUO-NEB) 2.5 MG-0.5 MG/3 ML  3 mL Nebulization Q6H PRN      bisacodyL (DULCOLAX) suppository 10 mg  10 mg Rectal DAILY PRN 10 mg at 02/22/23 0839    0.45% sodium chloride infusion  10 mL/hr IntraVENous CONTINUOUS Stopped at 03/10/23 1707    dexmedeTOMidine in 0.9 % NaCl (PRECEDEX) 400 mcg/100 mL (4 mcg/mL) infusion soln  0.1-1.5 mcg/kg/hr IntraVENous TITRATE Stopped at 03/08/23 2300       Labs/Data:    Lab Results   Component Value Date/Time    WBC 6.6 03/19/2023 02:12 AM    HGB 7.0 (L) 03/19/2023 02:12 AM    HCT 21.9 (L) 03/19/2023 02:12 AM    PLATELET 236 46/98/1889 02:12 AM    .3 (H) 03/19/2023 02:12 AM       Lab Results   Component Value Date/Time    Sodium 138 03/19/2023 02:12 AM    Potassium 4.3 03/19/2023 02:12 AM    Chloride 105 03/19/2023 02:12 AM    CO2 26 03/19/2023 02:12 AM    Anion gap 7 03/19/2023 02:12 AM    Glucose 140 (H) 03/19/2023 02:12 AM    BUN 26 (H) 03/19/2023 02:12 AM    Creatinine 1.74 (H) 03/19/2023 02:12 AM    BUN/Creatinine ratio 15 03/19/2023 02:12 AM    GFR est AA 46 (L) 06/23/2022 09:40 AM    GFR est non-AA 38 (L) 06/23/2022 09:40 AM    eGFR 41 (L) 03/19/2023 02:12 AM    eGFR 69 01/25/2023 11:55 AM    Calcium 10.0 03/19/2023 02:12 AM       Wt Readings from Last 3 Encounters:   03/19/23 62.1 kg (136 lb 14.5 oz)   01/25/23 55.8 kg (123 lb)   01/23/23 54.9 kg (121 lb)         Intake/Output Summary (Last 24 hours) at 3/19/2023 1337  Last data filed at 3/19/2023 1300  Gross per 24 hour   Intake 2021.62 ml   Output 1533.6 ml   Net 488.02 ml         Patient seen and examined. Chart reviewed. Labs, data and other pertinent notes reviewed in last 24 hrs.     PMH/SH/FH reviewed and unchanged compared to H&P    Discussed case with CVICU RN       Orville Abel MD  [facility]

## 2023-03-20 PROBLEM — Z95.811 LVAD (LEFT VENTRICULAR ASSIST DEVICE) PRESENT (HCC): Status: ACTIVE | Noted: 2023-01-01

## 2023-03-20 NOTE — PROGRESS NOTES
Comprehensive Nutrition Assessment    Type and Reason for Visit: Reassess    Nutrition Recommendations/Plan:     Continue TF of Vital 1.5 @ 40 ml/hr with 1 packet Prosource TID and flushes of 30 mL H2O q 4 hours. Trial Banatrol Flakes for diarrhea/ liquid stool. Current formula remains most appropriate for overall GI tolerance, elevated lipase, elevated t. Bili and LFTs d/t its structure lipids. Vital 1.5 fat content: 47.5% MCT, 52.5% LCT    If no improvement in diarrhea, ?if would benefit from pancreatic enzymes. This should be based on non-MCT fat content which works out to ~26-29 gm/day    If concerns for feeding with pancreatitis, consider advancing DHT / replacing with longer tube to feed past ligament of treitz. Pt would not be a good candidate for TPN given liver fx. Malnutrition Assessment:  Malnutrition Status: Moderate malnutrition (03/09/23 1427)    Context:  Acute illness     Findings of the 6 clinical characteristics of malnutrition:   Energy Intake:  Mild decrease in energy intake (specify) (TF off/ at trickle for past 48 hours)  Weight Loss:  Unable to assess - d/t fluid    Body Fat Loss:  Mild body fat loss, Buccal region   Muscle Mass Loss: Moderate muscle mass loss, Clavicles (pectoralis & deltoids), Temples (temporalis), Thigh (quadriceps)  Fluid Accumulation:  Mild, Genitals, Extremities, Generalized   Strength:  Not performed        Nutrition Assessment:    PMHx: CAD s/p PCI x 2, HFrEF with EF 25-30%, DM, HTN, hypercholesterolemia, Nstemi, CKD stage III. Admitted 1/26 d/t ongoing symptoms r/t his HFrEF and LVAD work-up. Tx to CVICU on 2/3 for [facility] placement and ongoing LVAD workup. Noted, as part of LVAD work-up PTA, pt underwent EGD with bx on 1/18/23 which path revealed well-differentiated neuroendocrine tumor. Oncology consulted, no absolute contraindications to LVAD. Nephrology following for TANNER on CKD 3. LVAD placed 2/15.   Pt extubated 2/17 but then had to be re-intubated 2/18 for placement of RVAD 2/18. Had plans to start CVVHD 2/17 however pt too unstable ON; started post RVAD (Impella RP support device). CRRT started 2/18. Tube feeding initiated 2/19. Liver failure with elevated T-bili (hemolysis and/or ischemic vs congestive hepatopathy). Impella removed 3/1. Code neuro on 3/1 and 3/2, SAH noted on CT. Unable to obtain MRI d/t LVAD. AC stopped. Extubated to BiPAP on 3/2, but re-intubated same day. Elevated lipase 3/5 and climbing - ?pancreatitis, possibly 2/2 hypercalcemia. Hypercalcemia - s/p Calcitonin, Sensipar increased. CRRT stopped 3/5 - transitioned to iHD on 3/6 --> back on CRRT as of 3/15. Trach placed 3/7. SLP following for in-line PMV trial when appropriate, but mentation had precluded trials. SAH persistent on head CT.      3/20: follow-up. Noted events since last week. GI was consulted, but unclear if still following. Tube feeds were held during work-up. US of abdomen 3/16: Heterogeneous liver suggestive of cirrhosis with trace perihepatic ascites, No biliary dilation. Stones and sludge in the gallbladder. CT of abdomen 3/17: Pancreatitis without evidence for hemorrhagic conversion; Cholelithiasis. Lipase remains elevated. However, pt was resumed on his feeds and has been tolerating at goal.  However, RN notes incontinent of very liquid stool. MD adding probiotic and also states requested Banatrol Flakes. Discussed K+ content and she states that can be controlled with his CRRT (was resumed 3/15). Palliative continues to follow. BG within parameters without use of basal insulin. Overall, pt is more alert and following commands. Able to move extremities. Weight continues to trend back down towards admission weight. 3/13:  TF back at goal.  BG more elevated, but pt was only receiving NPH 5 units BID as it was cut back last week when TF rate was decreased.   However, now that back at goal rate, previously NPH of 10 units BID is being resumed. Also noted for more loose stools. Lipase up further, however pancreas and CBD unable to be assessed on ABD CT on 3/10 as it was obscured by bowel gas; abd CT with evidence of chronic hepatic congestion and possible cirrhosis. LFTs and T bili up further. Hepatology consulted. Impression: doubts cirrhosis, suspects the change in liver morphology on US that led radiologist to suggest cirrhosis is present is simply d/t severe passive congestion and swelling of the liver and suspects will improve if pump improves. Notes that continued severe biventricualr failure can result in cirrhosis over time. Family meetings scheduled weekly. Unclear if pancreatitis has been r/o since technically it was not visualized. I do not see that GI has been consulted and not sure if would be of benefit. Stools are looser than documented last week, ?if has any pancreatic insufficiency. Already on higher MCT tube feeds. Spoke with RN Heraclio Hemphill - states they believe everything is r/t heart and no overt GI intolerance concerns. Pt has not had any further vomiting. Has loose stools, but states they are manageable. Weight relatively stable since last week, still up from euvolemic wt with edema noted below. Wife was present at bedside today. No questions. 3/9: TF held 3/8 for N/V yesterday, resumed today (3/9) @ trickle - 20 mL/hr. Per RN, pt was tolerating at goal up until trach and yesterday pt was vomiting blood that was from trach site. Has done well with trickle feeds so far today and giving Zofran prophylactically - pt did tolerate a packet of Prosource today. T bili, LFTs, lipase all trending up - plans to monitor. +jaundice. RN states this is the first she is reading about the pancreatitis - it hasn't been brought up as a major concern in regards to nutrition. Respiratory rate too high for in-line PMV. HD today - received 3/6, 3/7, none 3/8. Off pressors, antimicrobials, sedation.   Insulin adjustments per Diabetes with reduced TF. Pt would not be a good candidate for TPN given worsening liver fx. Hopefully will be able to tolerate increases in tube feeds soon. Weight up d/t fluid. Updated malnutrition assessment - continues to meet moderate PCM criteria, but worsening. Difficult to assess fat loss and wt loss with edema present, so could very well meet severe PCM criteria. Will continue to monitor. TF at goal of Vital 1.5 @ 40 mL/hr with 1 packet Prosource TID and flushes of 30 mL h2o q 4 hours provides 880 mL, 1500 kcal, 104 gm pro, 165 gm CHO, 669+270+306=7630 mL free H2o. This meets 100% kcal needs and 95% pro needs respectively. 3/6: follow-up. Events noted above. Failed extubation and now discussions re: trach. Off pressors and propofol. Precedex for sedation. Elevated lipase 3/5, ? pancreatitis. Already on more appropriate formula with higher MCT ratio. Continues on TF at goal without overt signs of intolerance. DHT replaced on 3/1 - imaging 3/5 reveals tip in duodenal bulb. Vital 1.5 @ 40 mL/hr with 1 packet Prosource TID and flushes of 30 mL H2o q 4 hours providing 880 mL, 1500 kcal, 104 gm pro, 165 gm CHO, and [phone]+476=6931 mL free H2o. Weight trending up, currently 3 L net positive. 1-2+ pitting edema. BG lower normal - insulin reduced. 3/1: pt remains intubated, on CRRT. Impella removed today. Propofol was resumed on 2/28, but only running @ 5.9 mL/hr which provides 156 kcal.  TF via NG of Vital 1.5 @ 40 mL/hr with 1 packet Prosource TID and flushes of 30 mL h2o q 4 hours providing 880 mL, 1500 kcal, 104 gm pro, 165 gm CHO, 669+270+104=0324 mL free H2o. With propofol = 1656 kcal which meets 104% kcal needs. Off levo, remains on epi. Insulin gtt off - now on NPH BID. BG within target range. Low K+ and Phos, repletion ordered. Having soft BMs.   Given malnutrition and overall more stable/ less pressors, current TF regimen remains appropriate. 2/27: follow-up. Remains vented on CRRT. Tolerating TF at goal.  Having looses BM 2-3x/day, but nothing excessive per RN. Propofol was at 3.9 mL/hr (103 kcal), but currently off and doing well. Vital 1.5 @ 35 ml/hr with 3 packets Prosource daily; minimal water flush of 30 ml q 4 hr providing 770 ml, 1320 calories, 96 gm protein, 42 gm fat, 43 mEq potassium and 940 ml free water. K+ WNL, Mg high, Phos low - improved since yesterday. BG well controlled, remains on insulin drip, but plans to adjust to basal insulin soon. Given pt is more stable and with malnutrition, recommend slightly increasing TF to better meet est needs without propofol running. Even if resumed at previous rate, would still be within kcal range/ not significant overfeeding. Will slightly increase TF to Vital 1.5 @ 40 mL/hr with 1 packet Prosource TID and flushes of 30 mL h2o q 4 hours provides 880 mL, 1500 kcal, 104 gm pro, 165 gm CHO, 669+270+332=3687 mL free H2o. This meets 100% kcal needs and 95% pro needs respectively. 2/20: Pt extubated 2/17 but then had to be re-intubated 2/18 for placement of RVAD 2/18. Plan to start CVVHD 2/17 however pt too unstable ON; started post RVAD (Impella RP support device). Tube feeding ordered yesterday; RD consult placed. Liver failure with elevated T-bili (hemolysis and/or ischemic vs congestive hepatopathy). Mr Rivera Kinsey has tolerated EN well thus far. Last BM however 2/14-bowel regimen is ordered. Recommend increasing Pericolace to bid since pt requiring a fair amount of Fentanyl. Will change formula to Vital 1.5 to avoid fiber and fat content (structured lipid 2/2 elevated bilirubin). New goal: Vital 1.5 @ 35 ml/hr with 3 packets Prosource daily; continue minimal water flush of 30 ml q 4 hr. This will provide 770 ml, 1320 calories, 96 gm protein, 42 gm fat, 43 mEq potassium and 940 ml free water (tube feeding/flush) per day to meet 88% protein needs.  Tube feeding and propofol will meet 100% estimated energy needs (1695 calories per day). 2/17: pt had LVAD placed 2/15 and remains intubated. Spoke with RN yesterday and again today - no plans to start TF, working on extubation now. RN states OG is too small for tube feeds anyway and worries it would clog, PO meds held. Pt would need a DHT. Even if pt extubated, will be a slow progression of diet over weekend. DHT may need to be considered, but also with Carafate QID, tube feeds would need to be held at various points throughout the day which makes regimen more complicated, so would consider intermittent vs nocturnal feeds if needed. PO intake was improving pre-op, but overall still with significant wt loss and meeting moderate malnutrition status prior to surgery. Weight up as expected post-op (d/t fluid). Currently 150 lb, but was 123 lb on 2/13 standing scale when last seen by this service. Ve Observed 8.68 l/min, tMax:102.4 °F  [place] EEN ~1800 kcal, which is similar to current EEN. Therefore, will not adjust given likelihood of extubation soon. If TF needed, recommend 4 cartons of Nepro - giving 1 carton over 1 hour QID that are timed around the Carafate. 4 cartons of Nepro daily with 60 mL H2O flushes 4x/day provide 948 mL, 1706 kcal, 77 gm pro, 153 gm CHO, and 692+969=768 mL free H2O.     Nutritionally Significant Medications:  Buminate, Risaquad, Merrem, Protonix, SSI   PRN: dilaudid, oxycodone, zofran      Estimated Daily Nutrient Needs:  Energy Requirements Based On: Formula  Weight Used for Energy Requirements: Admission (54.4 kg)  Energy (kcal/day): 6476-5758 (25-30 kcal/kg; ~1500 kcal/day using [id number])  Weight Used for Protein Requirements: Admission  Protein (g/day): 110 (2 gm/kg)  Method Used for Fluid Requirements: Standard renal  Fluid (ml/day): 500 mL + OP    Nutrition Related Findings:   Edema: Facial: Scleral (3/20/2023  8:00 AM)  Generalized: Non-pitting (3/20/2023  8:00 AM)  LUE: Trace (3/20/2023  8:00 AM)  RUE: Trace (3/20/2023  8:00 AM)    Last BM: 03/20/23, Watery, Loose    Wounds: Multiple, Deep tissue injury, Unstageable, Surgical incision  Per WOCN 3/6-     1. POA Unstageable Pressure Injury  Dry, stable eschar - unchanged  Betadine in use. 2. Left Heel Deep Tissue Injury  Blood-filled bullae - unchanged     3. Sacral deep tissue injury  ~5 x 4 cm with irregular margins  No blistering or induration  Venelex and foam applied    Current Nutrition Therapies:  Diet: NPO  Nutrition Support: TF via DHT of Vital 1.5 @ 20 mL/hr with 30 mL h2o flushes q 4 hours. Goal is 40 mL/hr with 3 packets Prosource daily. Anthropometric Measures:  Height: 5' 6\" (167.6 cm)  Ideal Body Weight (IBW): 142 lbs (65 kg)  Admission Body Weight: 120 lb  Current Body Wt:  62.8 kg (138 lb 7.2 oz), 97.5 % IBW.  Bed scale  Current BMI (kg/m2): 22.4        Weight Adjustment: No adjustment                 BMI Category: Underweight (BMI less than 22) age over 72    Last 3 Recorded Weights in this Encounter    03/18/23 0533 03/19/23 0600 03/20/23 0700   Weight: 63 kg (138 lb 14.2 oz) 62.1 kg (136 lb 14.5 oz) 62.8 kg (138 lb 7.2 oz)     Last 3 Recorded Weights in this Encounter    03/07/23 0537 03/08/23 0200 03/09/23 0323   Weight: 66.1 kg (145 lb 11.6 oz) 67.6 kg (149 lb 0.5 oz) 70.7 kg (155 lb 13.8 oz)     Last 3 Recorded Weights in this Encounter    03/04/23 0700 03/05/23 0700 03/06/23 0218   Weight: 62.3 kg (137 lb 5.6 oz) 62.9 kg (138 lb 10.7 oz) 64.5 kg (142 lb 3.2 oz)     Last 3 Recorded Weights in this Encounter    02/25/23 0619 02/26/23 0431 02/27/23 0430   Weight: 67.7 kg (149 lb 4 oz) 65.4 kg (144 lb 2.9 oz) 62.2 kg (137 lb 2 oz)     Wt Readings from Last 10 Encounters:   02/27/23 62.2 kg (137 lb 2 oz)   01/25/23 55.8 kg (123 lb)   01/23/23 54.9 kg (121 lb)   01/21/23 54.4 kg (120 lb)   01/20/23 52.2 kg (115 lb)   01/20/23 52.2 kg (115 lb)   01/19/23 53 kg (116 lb 13.5 oz)   12/19/22 58.5 kg (129 lb)   12/16/22 57.4 kg (126 lb 8.7 oz)   12/05/22 59 kg (130 lb)       Nutrition Diagnosis:   Inadequate oral intake related to impaired respiratory function, cardiac dysfunction as evidenced by intubation, nutrition support-enteral nutrition  Increased nutrient needs related to increased demand for energy/nutrients, renal dysfunction as evidenced by dialysis, wounds  Altered GI function related to  (?pancreatitis) as evidenced by diarrhea      Nutrition Interventions:   Food and/or Nutrient Delivery: Continue tube feeding  Nutrition Education/Counseling: No recommendations at this time  Coordination of Nutrition Care: Continue to monitor while inpatient       Goals:  Previous Goal Met: Progressing toward goal(s)  Goals: other (specify)  Specify Other Goals: tolerate TF at goal rate to meet EEN over next 3-7 days; decreased liquid stool in next 7 days    Nutrition Monitoring and Evaluation:   Behavioral-Environmental Outcomes: None identified  Food/Nutrient Intake Outcomes: Enteral nutrition intake/tolerance  Physical Signs/Symptoms Outcomes: Biochemical data, GI status, Fluid status or edema, Hemodynamic status, Nutrition focused physical findings, Weight, Skin    Discharge Planning:     Too soon to determine    Recent Labs     03/20/23 0444 03/19/23 0212 03/18/23 2229 03/18/23 0317   * 140* 145* 81   BUN 28* 26* 29* 28*   CREA 1.63* 1.74* 1.82* 2.06*   * 138 136 137   K 4.0 4.3 4.4 4.3    105 105 104   CO2 25 26 26 24   CA 9.7 10.0 10.5* 10.9*   PHOS 2.8 2.1*  --  2.3*   MG 2.9* 2.9* 2.9* 3.0*       Recent Labs     03/20/23 0444 03/19/23 0212 03/18/23 2229 03/18/23 0317   * 123* 129* 146*   * 262* 269* 303*   * 371* 378* 388*   TBILI 24.4* 24.0* 24.4* 24.4*   TP 6.5 6.4 6.5 6.3*   ALB 3.3* 3.4* 3.5 3.3*   GLOB 3.2 3.0 3.0 3.0   LPSE >3,000*  --   --  2,677*       Recent Labs     03/20/23 0444 03/19/23  0212   WBC 7.7 6.6   HGB 8.2* 7.0*   HCT 24.6* 21.9*   PLT 205 222       No results for input(s): PREALB in the last 72 hours.     Prealbumin   Date Value Ref Range Status   03/14/2023 10.2 (L) 20.0 - 40.0 mg/dL Final   03/07/2023 13.1 (L) 20.0 - 40.0 mg/dL Final   03/05/2023 13.5 (L) 20.0 - 40.0 mg/dL Final     Recent Labs     03/20/23  0810 03/20/23  0414 03/20/23  0011 03/19/23  2043 03/19/23  1701 03/19/23  1224 03/19/23  0841 03/19/23  0421 03/19/23  0017 03/18/23  2016 03/18/23  1717 03/18/23  1247 03/18/23  0333 03/18/23  0010 03/17/23  2246 03/17/23  2108   GLUCPOC 135* 121* 132* 142* 161* 128* 129* 132* 134* 128* 108 105 80 79 78 79       Lab Results   Component Value Date/Time    Hemoglobin A1c 7.7 (H) 01/11/2023 06:14 PM    Hemoglobin A1c 6.5 (H) 12/06/2022 03:53 AM    Hemoglobin A1c 7.0 (H) 10/12/2022 09:10 AM         Mi Gibson RD   Available via [employer]

## 2023-03-20 NOTE — PROGRESS NOTES
[facility] in [place], [place]    Call received from [address] noting pt had low flow where PI showed on the LVAD tower screen as dashes. Under interrogation PI at time of low flow reads 0.0. Pt's parameters are now WNL- PI about 5. Contacted [facility] line who recommended sending log files, no other interventions recommended at this time. Log files downloaded to be sent to [facility] for review. MD updated. Pump parameters WNL at time of download.

## 2023-03-20 NOTE — PROGRESS NOTES
2000: Bedside and Verbal shift change report given to 1330 Justina Whitman (oncoming nurse) by Nayeli Rubin RN (offgoing nurse). Report included the following information SBAR, OR Summary, Intake/Output, MAR, Recent Results, Cardiac Rhythm NSR, and Alarm Parameters . 2120: Pt MAP >80, norepi stopped    0800: Bedside and Verbal shift change report given to 1315 Darwin Whitman (oncoming nurse) by Cathy Lugo RN (offgoing nurse). Report included the following information SBAR, OR Summary, Intake/Output, MAR, Recent Results, Cardiac Rhythm NSR, and Alarm Parameters . Shift Summary: pt slept, no complaints of pain, trach dressing changed.  BM x2

## 2023-03-20 NOTE — PROGRESS NOTES
RENAL  PROGRESS NOTE        Subjective:    Seen and examined on CVVH    Objective:   VITALS SIGNS:    Visit Vitals  BP (!) 96/59 Comment: arterial line reading   Pulse 81   Temp 97.7 °F (36.5 °C)   Resp 26   Ht 5' 6\" (1.676 m)   Wt 62.8 kg (138 lb 7.2 oz)   SpO2 100%   BMI 22.35 kg/m²       O2 Device: Tracheostomy, Ventilator   O2 Flow Rate (L/min): 20 l/min   Temp (24hrs), Av.1 °F (36.7 °C), Min:97.4 °F (36.3 °C), Max:98.6 °F (37 °C)         PHYSICAL EXAM:  Intubated  alert    DATA REVIEW:     INTAKE / OUTPUT:   Last shift:       07 - 1900  In: 323 [I.V.:83]  Out: 195.4   Last 3 shifts: 1901 -  0700  In: 3103.4 [I.V.:1043.4]  Out: 2496.8     Intake/Output Summary (Last 24 hours) at 3/20/2023 1035  Last data filed at 3/20/2023 1000  Gross per 24 hour   Intake 1990.22 ml   Output 1725.4 ml   Net 264.82 ml         LABS:   Recent Labs     234 237   WBC 7.7 6.6 7.0   HGB 8.2* 7.0* 7.6*   HCT 24.6* 21.9* 23.2*    222 244     Recent Labs     234 23  0212 239 23  0317   * 138 136 137   K 4.0 4.3 4.4 4.3    105 105 104   CO2 25 26 26 24   * 140* 145* 81   BUN 28* 26* 29* 28*   CREA 1.63* 1.74* 1.82* 2.06*   CA 9.7 10.0 10.5* 10.9*   MG 2.9* 2.9* 2.9* 3.0*   PHOS 2.8 2.1*  --  2.3*   ALB 3.3* 3.4* 3.5 3.3*   TBILI 24.4* 24.0* 24.4* 24.4*   * 123* 129* 146*   INR 1.4* 1.8*  --  1.9*         Assessment:  TANNER on CKD-3a: Suspect cardiorenal effects initially. Now has LVAD and  POST OP ATN. Anuric->Requiring CRRT>>iHD-> back to CRRT 3/15/23. left IJ Hermelindo catheter was last replaced on 3/11/2023 by intensivist     hypercalcemia/immobilization-    Elevated LFTs/Hyperbilirubinemia  Possible pancreatitis- with elevated lipase;    Ischemic CM: Recurrent exacerbations. EF 20%. S/p LVAD on 2/15.  Required Impella RP for RV support  CVA  Sepsis  BPH   Well differentiated NET Grade 1: noted on EGD 1/18/23  Anemia 2 to CKD:  s/p PRBCS  Left UE basilic and radial vein thrombus  Thrombocytopenia  Myopathy  Hypermagnesemia        Plan/Recommendations:  Ct CVVH-> 4K Prismasol bags.  Factor rate at 0cc/hr  Weaning Levophed     Stop sensipar  Will use Biphosphonate if calcium up again  Discussed with LORI White MD

## 2023-03-20 NOTE — PROGRESS NOTES
Problem: Self Care Deficits Care Plan (Adult)  Goal: *Acute Goals and Plan of Care (Insert Text)  Description:   FUNCTIONAL STATUS PRIOR TO ADMISSION: Patient required minimal assistance for basic and instrumental ADLs. Used rollator for functional mobility, had HHA and HH OT/PT upon discharge. HOME SUPPORT: The patient lived with wife and family members. Occupational Therapy Goals  Initiated 1/30/2023; Goals remain the same, LVAD scheduled for 2/15  1. Patient will perform grooming with supervision/set-up within 7 day(s). 2.  Patient will perform upper body dressing with supervision/set-up within 7 day(s). 3.  Patient will perform lower body dressing with supervision/set-up within 7 day(s). 4.  Patient will perform all aspects of toileting with supervision/set-up within 7 day(s). 5.  Patient will utilize energy conservation techniques during functional activities with verbal cues within 7 day(s). Occupational Therapy Goals  Initiated 3/13/2023  1. Patient will perform ADLs standing 5 mins without fatigue or LOB with maximal assistance  within 7 day(s). 2.  Patient will perform lower body ADLs with maximal assistance  within 7 day(s). 3.  Patient will perform upper body ADLs with maximal assistance within 7 day(s). 4.  Patient will perform toilet transfers with maximal assistance within 7 day(s). 5.  Patient will perform all aspects of toileting with maximal assistance within 7 day(s). 6.  Patient will participate in cardiac/sternal upper extremity therapeutic exercise/activities to increase independence with ADLs with maximal assistance for 5 minutes within 7 day(s). 7.  Patient will perform VAD switchover routine as part of ADL routine (including batteries<>batteries, battery clip<>battery clip, mock SC<>SC) with maximal assistance within 7 days. 8. Patient will participate in [employer] and/or [facility] when appropriate within 7 days. Goals updated 3/20/2023  1. Patient will perform grooming in chair position supine in bed with maximal assistance  within 7 day(s). 2.  Patient will attend to L/R visual fields consistently in prep for ADLs with maximal cueing within 7 day(s). 3.  Patient will perform upper body ADLs with maximal assistance via sophia dressing techniques within 7 day(s). 4.  Patient will perform toilet transfers with maximal assistance within 7 day(s). 5.  Patient will perform all aspects of toileting with maximal assistance within 7 day(s). 6.  Patient will participate in cardiac/sternal upper extremity therapeutic exercise/activities to increase independence with ADLs with maximal assistance for 5 minutes within 7 day(s). 7.  Patient will perform VAD switchover routine as part of ADL routine (including batteries<>batteries, battery clip<>battery clip, mock SC<>SC) with maximal assistance within 7 days. 8. Patient will participate in [address] [place] [address] [email or web address] and/or [address] [place] [address] when appropriate within 7 days. 3/20/2023 1305 by Mike Bryant OT  Outcome: Progressing Towards Goal   OCCUPATIONAL THERAPY TREATMENT/WEEKLY RE-ASSESSMENT  Patient: Alivia Castaneda (76 y.o. male)  Date: 3/20/2023  Diagnosis: CHF (congestive heart failure) ([facility]) [I50.9] <principal problem not specified>  Procedure(s) (LRB):  LEFT GROIN RP IMPELLA INSERTION, KIARRA BY DR Zuhair Mott (Left) 30 Days Post-Op  Precautions: Fall (mindful movement)  Chart, occupational therapy assessment, plan of care, and goals were reviewed. ASSESSMENT  Patient continues with skilled OT services and is slowly progressing towards goals. Patient with improving alertness on this date, able to briefly attend to R/L visual fields with multimodal cueing and assistance to maintain head in midline. Received supine in bed, on vent support & CRRT cleared for bed level activity with OT/PT.  Patient able to volitionally grasp with RUE, able to place object in therapists hand on R/L sides with proximal facilitation. Patient continues to demonstrating painful, hypertonicity and clonus in RLE > RUE. Continues to demonstrated LUE/LLE flaccidity, mild movement noted in LLE briefly however unsure if movement is volitional vs involuntary motor overflow from more proximal movements/R sided motor activation. Patient remaining lethargic and yawning throughout session, inconsistently able to maintain eyes open/attend the therapist with multimodal cueing. Patient remained supine in bed all needs met at conclusion of session, RN notified. Current Level of Function Impacting Discharge (ADLs): total A     Other factors to consider for discharge: LVAD HM III, vent, CRRT         PLAN :  Goals have been updated based on progression since last assessment. Patient continues to benefit from skilled intervention to address the above impairments. Continue to follow patient 4 times a week to address goals. Recommendation for discharge: (in order for the patient to meet his/her long term goals)  To be determined: will require some level of rehab    This discharge recommendation:  Has been made in collaboration with the attending provider and/or case management    IF patient discharges home will need the following DME: TBD       SUBJECTIVE:   Patient mouthing ow.     OBJECTIVE DATA SUMMARY:   Cognitive/Behavioral Status:  Neurologic State: Drowsy  Orientation Level: Unable to verbalize  Cognition: Follows commands             Functional Mobility and Transfers for ADLs:  Bed Mobility:   Total A      ADL Intervention:   Patient continues to require up to total A for Adl tasks    Pain:  Pt did endorse pain with PROM of RLE, unable to verbalize however demonstrating pain behaviors (mouthing \"ow\", patting hand to RLE with motion)    Activity Tolerance:   Poor, requires rest breaks, and on CRRT, vent    After treatment patient left in no apparent distress:   Supine in bed, Call bell within reach, and MERLIN    COMMUNICATION/COLLABORATION:   The patients plan of care was discussed with: Physical therapist, Occupational therapist, and Registered nurse.      Naty Dumont OT  Time Calculation: 19 mins

## 2023-03-20 NOTE — PROGRESS NOTES
[facility]    ICU TEAM Progress Note    Name: Anna Webber   : 1950   MRN: [id number]   Date: 3/20/2023        Subjective:   Progress Note: 3/20/2023      Reason for ICU Admission: Cardiomyopathy status post LVAD implantation    Interval history:  71year old male PMH as noted above admitted to [facility] on  due to ongoing symptoms secondary to his HFrEF. Treated for possible CAP, and diuresed for acute on chronic HFrEF. Nephrology following for TANNER on CKD 3. AHF team recommended transfer to CVICU for [facility] placement and ongoing LVAD workup, with placement 2/15. Pt extubated , however with development of worsening renal dysfunction overnight and unable to tolerate CRRT so was reintubated and taken for Impella RP placement for right-sided support in a.m. of  and CRRT resumed. Impella removed on 3/1. Overnight 3/1-3/1 CVA noted with SAH.  3/2 SAH enlarged. Held anticoagulation. Failed extubation 3/2. Trach placed 3/7. Transitioned to [facility]. Repeat CTH 3/10 w/ persistent SAH, heparin held. Overnight Events:   No acute event, no issues with CRRT, off pressors, tolerating tube feeding no fever. Open eyes spontaneously follows simple command at times right side stronger than left but this is unchanged according to nursing staff.   Tolerated SBT for 45 minutes today    Active Problem List:     Problem List  Date Reviewed: 2023            Codes Class    Dyslipidemia, goal LDL below 70 ICD-10-CM: E78.5  ICD-9-CM: 272.4         LVAD (left ventricular assist device) present ([facility] ICD-10-CM: Z95.811  ICD-9-CM: V43.21         PAD (peripheral artery disease) ([facility] ICD-10-CM: I73.9  ICD-9-CM: 556.2         Systolic CHF, acute on chronic (HCC) ICD-10-CM: I50.23  ICD-9-CM: 428.23, 428.0         Receiving inotropic medication ICD-10-CM: Z79.899  ICD-9-CM: V49.89         CHF (congestive heart failure) ([facility]) ICD-10-CM: I50.9  ICD-9-CM: 428.0         CKD stage 3 due to type 2 diabetes mellitus [facility]) ICD-10-CM: E11.22, N18.30  ICD-9-CM: 250.40, 585.3         S/P CABG x 3 ICD-10-CM: Z95.1  ICD-9-CM: V45.81     Overview Signed 7/9/2018 11:20 AM by SCAR Chavis     Coronary Artery Bypass Grafting x 3, LIMA to LAD, RSVG to OM, RSVG to RCA  Right GSV EVH             Coronary artery disease involving native coronary artery of native heart without angina pectoris ICD-10-CM: I25.10  ICD-9-CM: 414.01         Hypertension, essential ICD-10-CM: I10  ICD-9-CM: 401.9         Colon cancer screening ICD-10-CM: Z12.11  ICD-9-CM: V76.51         Nonrheumatic aortic valve stenosis ICD-10-CM: I35.0  ICD-9-CM: 424.1         Bruit of right carotid artery ICD-10-CM: R09.89  ICD-9-CM: 785. 9         Type 2 diabetes mellitus with hyperglycemia (HCC) ICD-10-CM: E11.65  ICD-9-CM: 250.00         Deafness ICD-10-CM: H91.90  ICD-9-CM: 389.9         Cramp of limb ICD-10-CM: R25.2  ICD-9-CM: 729.82            Past Medical History:      has a past medical history of CAD (coronary artery disease) (11/10/2016), Deafness (10/28/2012), DM (diabetes mellitus) ([facility] Elevated cholesterol, Hypertension, and NSTEMI (non-ST elevated myocardial infarction) ([facility] (11/10/2016). Past Surgical History:      has a past surgical history that includes hx appendectomy; pr unlisted procedure cardiac surgery (11/11/2016); colonoscopy (N/A, 6/28/2018); and colonoscopy (N/A, 1/18/2023). Home Medications:     Prior to Admission medications    Medication Sig Start Date End Date Taking? Authorizing Provider   polyethylene glycol (Miralax) 17 gram/dose powder Take 17 g by mouth daily as needed for Constipation. Yes Provider, Historical   furosemide (LASIX) 20 mg tablet Take 1 Tablet by mouth daily as needed (for weight greater than 120 lb by 2-3 lb or shortness of breath).  1/23/23  Yes Angela Linton, JACOB   glipiZIDE (GLUCOTROL) 5 mg tablet Take 1 tablet by mouth twice daily 12/2/22  Yes Jim Oseguera MD   ipratropium (ATROVENT) 21 mcg (0.03 %) nasal spray 2 Sprays by Both Nostrils route every twelve (12) hours. 11/21/22  Yes Governor Niko MD   ergocalciferol (ERGOCALCIFEROL) 1,250 mcg (50,000 unit) capsule Take 1 Capsule by mouth every seven (7) days. Patient taking differently: Take 50,000 Units by mouth every seven (7) days. Mondays 10/14/22  Yes Governor Niko MD   Januvia 50 mg tablet Take 1 tablet by mouth once daily 9/23/22  Yes Governor Niko MD   rosuvastatin (CRESTOR) 20 mg tablet Take 1 Tablet by mouth nightly. 2/28/22  Yes Gayla Varela MD   aspirin delayed-release 81 mg tablet Take 81 mg by mouth daily. Yes Provider, Historical   zolpidem (AMBIEN) 5 mg tablet Take 1 Tablet by mouth nightly as needed for Sleep. Max Daily Amount: 5 mg. 1/24/23   Governor Niko MD   nitroglycerin (NITROSTAT) 0.4 mg SL tablet 1 Tablet by SubLINGual route every five (5) minutes as needed for Chest Pain. Up to 3 doses. 11/16/22   Matilde Knight MD       Allergies/Social/Family History: Allergies   Allergen Reactions    Ambien [Zolpidem] Other (comments)     Causes extreme confusion      Social History     Tobacco Use    Smoking status: Never     Passive exposure: Never    Smokeless tobacco: Never   Substance Use Topics    Alcohol use:  Yes     Alcohol/week: 2.0 standard drinks     Types: 1 Cans of beer, 1 Shots of liquor per week     Comment: rarely      Family History   Problem Relation Age of Onset    Heart Disease Father     Heart Attack Father     Hypertension Mother     Elevated Lipids Brother     Elevated Lipids Brother     No Known Problems Sister     Elevated Lipids Brother     No Known Problems Son     No Known Problems Daughter     Anesth Problems Neg Hx        Review of Systems:     Not able to obtain due to patient medical condition    Objective:   Vital Signs:  Visit Vitals  BP (!) 96/59 Comment: arterial line reading   Pulse 84   Temp 97.6 °F (36.4 °C)   Resp 22   Ht 5' 6\" (1.676 m)   Wt 62.8 kg (138 lb 7.2 oz)   SpO2 100% BMI 22.35 kg/m²    O2 Flow Rate (L/min): 20 l/min O2 Device: Tracheostomy, Ventilator Temp (24hrs), Av.9 °F (36.6 °C), Min:97.4 °F (36.3 °C), Max:98.6 °F (37 °C)    CVP (mmHg): 9 mmHg (23 1400)      Intake/Output:     Intake/Output Summary (Last 24 hours) at 3/20/2023 1535  Last data filed at 3/20/2023 1400  Gross per 24 hour   Intake 1967.05 ml   Output 1795.6 ml   Net 171.45 ml       Physical Exam:    General:  Awake, sleepy at times, chronically ill looking on mechanical ventilation and CRRT   Eyes:  Sclera anicteric. Pupils equally round and reactive to light. Mouth/Throat: Mucous membranes normal, oral pharynx clear   Neck: Line in place bilaterally   Lungs:   Coarse crepitation bilaterally   CV:  LVAD hum   Abdomen:   Soft, non-tender. bowel sounds normal. non-distended   Extremities: No cyanosis or edema   Neuro: Open eyes spontaneously, wave and smile, at times follow simple command mainly on the right side, did not appreciate movement on left arm, minimal movement on left leg       LABS AND  DATA: Personally reviewed  Recent Labs     23   WBC 7.7 6.6   HGB 8.2* 7.0*   HCT 24.6* 21.9*    222     Recent Labs     23   * 138   K 4.0 4.3    105   CO2 25 26   BUN 28* 26*   CREA 1.63* 1.74*   * 140*   CA 9.7 10.0   MG 2.9* 2.9*   PHOS 2.8 2.1*     Recent Labs     23   * 371*   TP 6.5 6.4   ALB 3.3* 3.4*   GLOB 3.2 3.0   LPSE >3,000*  --      Recent Labs     23   INR 1.4* 1.8*   PTP 14.4* 17.7*      No results for input(s): PHI, PCO2I, PO2I, FIO2I in the last 72 hours. No results for input(s): CPK, CKMB, TROIQ, BNPP in the last 72 hours.     Hemodynamics:   PAP: PAP Systolic: 44 (28/ 2990) CO: CO (l/min): 4.8 l/min (03/10/23 0900)   Wedge: PAOP (PCWP) (mmhg): 36 mmHg (23 1830) CI: CI (l/min/m2): 2.7 l/min/m2 (03/10/23 0900)   CVP:  CVP (mmHg): 9 mmHg (03/20/23 1400) SVR:       PVR:       Ventilator Settings:  Mode Rate Tidal Volume Pressure FiO2 PEEP   Assist control, Volume control   350 ml  12 cm H2O 40 % 5 cm H20     Peak airway pressure: 22 cm H2O    Minute ventilation: 8.21 l/min        MEDS: Reviewed    Chest X-Ray:  CXR Results  (Last 48 hours)                 03/19/23 0441  XR CHEST PORT Final result    Impression:  1. Persistent right pleural effusion with moderate atelectasis at the right lung   base that has increased. 2. Aeration in the left lower lobe has improved with decreasing atelectasis. Narrative:  EXAM:  XR CHEST PORT       INDICATION: Post ventricular assist device, intubated       COMPARISON: 3/17/2023       TECHNIQUE: Semiupright portable chest AP view at 0434       FINDINGS: The tracheostomy tube overlies the airway. Right IJ catheter and left   IJ catheter remain in place. Feeding tube courses into the stomach. The distal   tip is not seen. LVAD is in place. The small right pleural effusion is unchanged. There is moderate atelectasis at   the right lung base that has slightly increased. Atelectasis at the left lung   base has decreased. Small left pleural effusion is present. No pulmonary edema   no pneumothorax. Assessment and Plan:   -Cardiomyopathy status post HeartMate 3 LVAD as destination therapy on April 15, 2023:  -Acute kidney injury on chronic kidney disease requiring CRRT:  - Hepatic congestion with elevated bilirubin:  - Right SUPA ischemic stroke with left hemiparesis  -Acute on chronic hypoxic respiratory failure, status post tracheostomy:  - Pancreatitis:  - Critical illness myopathy:    NEUROLOGICAL: Acute encephalopathy, critical illness polyneuropathy   No acute changes in his neurological finding, left hemiparesis. On aspirin.   Seems that his alertness is improving slowly  PULMONOLOGY: Acute proximal respiratory failure, status post tracheostomy 7 March 2023   -Protective ventilatory strategy  -Daily SBT    -45 minutes this am  -Pulmonary hygiene     CARDIOVASCULAR: Ischemic and nonischemic cardiomyopathy, left ventricular ejection fraction 25 to 30%, status post LVAD 15 February 2023, status post Impella right side removed 1 March 2023, cardiorenal syndrome   -Appreciate heart failure and cardiothoracic surgery input and management  -Continue aspirin     GASTROINTESTINAL: Hepatic congestion, hyperbilirubinemia, possible cirrhosis, pancreatitis   -Continue enteral feeds  -Continue GI prophylaxis  -Total bilirubin continue to trend up, lipase remains elevated     RENAL/ELECTROLYTE/FLUIDS: Acute on chronic renal failure   -Continue hemodialysis with continuous renal replacement therapy  -Strict I's and O's  -Place electrolytes as indicated  -Nephrology following, I appreciate the recommendation and management    ENDOCRINE:   -Maintain blood glucose levels between 140-180   -Sliding scale insulin   HEMATOLOGY/ONCOLOGY: Acute on chronic anemia, gastric neuroendocrine tumor   Transfuse for hemoglobin below 7  Off heparin or other anticoagulation at this time  ID/MICRO:   Patient on meropenem at least since the 16th. Received multiple antibiotic regimen before that. I discussed heart failure team the appropriateness of antibiotic de-escalation. DISPOSITION  Stay in ICU    CRITICAL CARE CONSULTANT NOTE  I had a face to face encounter with the patient, reviewed and interpreted patient data including clinical events, labs, images, vital signs, I/O's, and examined patient. I have discussed the case and the plan and management of the patient's care with the consulting services, the bedside nurses and the respiratory therapist.      NOTE OF PERSONAL INVOLVEMENT IN CARE   This patient has a high probability of imminent, clinically significant deterioration, which requires the highest level of preparedness to intervene urgently.  I participated in the decision-making and personally managed or directed the management of the following life and organ supporting interventions that required my frequent assessment to treat or prevent imminent deterioration. I personally spent 45 minutes of critical care time. This is time spent at this critically ill patient's bedside actively involved in patient care as well as the coordination of care and discussions with the patient's family. This does not include any procedural time which has been billed separately. Tejal Wright M.D.   Staff Intensivist/Pulmonologist  [facility]  3/20/2023

## 2023-03-20 NOTE — PROGRESS NOTES
Problem: Mobility Impaired (Adult and Pediatric)  Goal: *Acute Goals and Plan of Care (Insert Text)  Description: FUNCTIONAL STATUS PRIOR TO ADMISSION: Patient was modified independent using a rollator for functional mobility. Pt recently d/c home after previous hospital stay. Able to ambulate community distances. HOME SUPPORT PRIOR TO ADMISSION: The patient lived with spouse but did not require assist.    Physical Therapy Goals  Initiated 3/12/2023-- continue all goals 3/20/2023  1. Patient will move from supine to sit and sit to supine , scoot up and down, and roll side to side in bed with maximal assistance within 7 days. 2.  Patient will sit EOB x3 minutes with max assist within 7 days. 3.  Patient will follow 50% of commands for participation in AROM while supine within 7 days. 4.  Patient will tolerate bed in chair position 30 min BID within 7 days. 5.  Family will be independent and compliant with in PROM for all extremities within 7 days. Updated 2/13/2023  1. Patient will move from supine to sit and sit to supine in bed with modified independence within 7 day(s). MET 2/13  2. Patient will transfer from bed to chair and chair to bed with modified independence using the least restrictive device within 7 day(s). MET 2/13  3. Patient will perform sit to stand with modified independence within 7 day(s). MET 2/13  4. Patient will ambulate with modified independence for 300 feet with the least restrictive device within 7 day(s). MET 2/13  5. Patient will ascend/descend 12 stairs with 1 handrail(s) with supervision/set-up within 7 day(s). 6.  Patient will verbally and functionally recall move in the tube techniques in prep for possible LVAD within 7 days. Physical Therapy Goals  Initiated 1/28/2023-- Goals appropriate and add #6 2/6/2023  1. Patient will move from supine to sit and sit to supine  in bed with modified independence within 7 day(s).     2.  Patient will transfer from bed to chair and chair to bed with modified independence using the least restrictive device within 7 day(s). 3.  Patient will perform sit to stand with modified independence within 7 day(s). 4.  Patient will ambulate with modified independence for 300 feet with the least restrictive device within 7 day(s). 5.  Patient will ascend/descend 12 stairs with 1 handrail(s) with supervision/set-up within 7 day(s). 6.  Patient will verbally and functionally recall move in the tube techniques in prep for possible LVAD within 7 days. Outcome: Progressing Towards Goal   PHYSICAL THERAPY TREATMENT: WEEKLY REASSESSMENT  Patient: Jem Dillard (76 y.o. male)  Date: 3/20/2023  Primary Diagnosis: CHF (congestive heart failure) ([facility]) [I50.9]  Procedure(s) (LRB):  LEFT GROIN RP IMPELLA INSERTION, KIARRA BY DR Johanna Roblero (Left) 30 Days Post-Op   Precautions:   Fall (mindful movement)      ASSESSMENT  Patient continues with skilled PT services and is progressing towards goals. Patient received supine in bed, woke quickly to voice and tactile stim. Waved to therapists and tracked to faces in both directions with verbal cues. Was able to reach with R UE for sponge with OT--see their note. Noted B LEs to move upon PT and OT stimulating patient to wake but did not move LEs on command. Noted sustained clonus on R LE with DF stretching, but no clonus on L. Patient grimacing in pain with PROM to R LE-- significant resistance to PROM and hypertonicity noted. Positioned head in midline at end of session to stretch L neck. Patient's progression toward goals since last assessment: more command following and alertness, not yet met any goals     Current Level of Function Impacting Discharge (mobility/balance): total assist     Functional Outcome Measure: The patient scored 6/24 on the [facility] outcome measure which is indicative of increased likelihood for need for therapy at d/c.       Other factors to consider for discharge: on [facility]         PLAN Kimberly Jamison Goals have been updated based on progression since last assessment. Patient continues to benefit from skilled intervention to address the above impairments. Recommendations and Planned Interventions: bed mobility training, transfer training, gait training, therapeutic exercises, neuromuscular re-education, patient and family training/education, and therapeutic activities      Frequency/Duration: Patient will be followed by physical therapy:  5 times a week to address goals.     Recommendation for discharge: (in order for the patient to meet his/her long term goals)  To be determined: based on progress with acute therapy     This discharge recommendation:  Has been made in collaboration with the attending provider and/or case management    IF patient discharges home will need the following DME: to be determined (TBD)         SUBJECTIVE:   Patient motioned no with his R hand during PROM to R LE.     OBJECTIVE DATA SUMMARY:   HISTORY:    Past Medical History:   Diagnosis Date    CAD (coronary artery disease) 11/10/2016    NSTEMI & 2 stents    Deafness 10/28/2012    DM (diabetes mellitus) ([facility] [address]     Elevated cholesterol     Hypertension     NSTEMI (non-ST elevated myocardial infarction) ([facility] [address] 11/10/2016     Past Surgical History:   Procedure Laterality Date    COLONOSCOPY N/A 6/28/2018    COLONOSCOPY performed by Fede Love MD at [address] [place] [address] ENDOSCOPY    COLONOSCOPY N/A 1/18/2023    COLONOSCOPY performed by Jm Baumann MD at [address] [place] [address]  11/11/2016    2 stents       Personal factors and/or comorbidities impacting plan of care: [facility]    Home Situation  Home Environment: Private residence  # Steps to Enter: 5  Rails to Enter: Yes  Hand Rails : Left  Wheelchair Ramp: No  One/Two Story Residence: Two story  # of Interior Steps: 12  Interior Rails: Left  Lift Chair Available: No  Living Alone: No  Support Systems: Spouse/Significant Other, Child(sebastián)  Patient Expects to be Discharged to[de-identified] Home with home health  Current DME Used/Available at Home: [address], rollator  Tub or Shower Type: Shower    EXAMINATION/PRESENTATION/DECISION MAKING:   Critical Behavior:  Neurologic State: Drowsy  Orientation Level: Unable to verbalize  Cognition: Follows commands  Safety/Judgement: Awareness of environment, Fall prevention, Insight into deficits  Hearing: Auditory  Auditory Impairment: Hard of hearing, bilateral, Hearing aid(s)  Hearing Aids/Status: Left  Range Of Motion:  AROM: Grossly decreased, non-functional  Strength:    Strength: Grossly decreased, non-functional  Tone & Sensation:   Tone: Abnormal (flaccid L UE; hypertonic R LE and R UE)  Coordination:  Coordination: Grossly decreased, non-functional  Functional Mobility:  Bed Mobility:  Rolling: Total assistance  Balance:   Sitting: Impaired; With support    Functional Measure:  [address] [address] AM-PAC®      Basic Mobility Inpatient Short Form (6-Clicks) Version 2  How much HELP from another person do you currently need. .. (If the patient hasn't done an activity recently, how much help from another person do you think they would need if they tried?) Total A Lot A Little None   1. Turning from your back to your side while in a flat bed without using bedrails? [x]  1 []  2 []  3  []  4   2. Moving from lying on your back to sitting on the side of a flat bed without using bedrails? [x]  1 []  2 []  3  []  4   3. Moving to and from a bed to a chair (including a wheelchair)? [x]  1 []  2 []  3  []  4   4. Standing up from a chair using your arms (e.g. wheelchair or bedside chair)? [x]  1 []  2 []  3  []  4   5. Walking in hospital room? [x]  1 []  2 []  3  []  4   6. Climbing 3-5 steps with a railing? [x]  1 []  2 []  3  []  4     Raw Score: 6/24                            Cutoff score ?171,2,3 had higher odds of discharging home with home health or need of SNF/IPR.     [facility] Liam Liang, Wilfrid Pompa, Opal Jason, Jose Alfredo Frye. Validity of the AM-PAC 6-Clicks Inpatient Daily Activity and Basic Mobility Short Forms. Physical Therapy Mar 2014, 94 3) 379-391; DOI: 10.2522/ptj.[id number]  2. Lewis Issa. Association of AM-PAC \"6-Clicks\" Basic Mobility and Daily Activity Scores With Discharge Destination. Phys Ther. 2021 Apr 4;101(4):mhkq817. doi: 10.1093/ptj/helx402. PMID: 48741470. V Sandra Land, Deepthi D, Lakshmi Dowd, Segundo K, George S. Activity Measure for Post-Acute Care \"6-Clicks\" Basic Mobility Scores Predict Discharge Destination After Acute Care Hospitalization in Select Patient Groups: A Retrospective, Observational Study. Arch Rehabil Res Clin Transl. 2022 Jul 16;4(3):180034. doi: 10.1016/j.arrct. 5325.163827. PMID: 15403359; PMCID: [id number]. 4. Mahalia Runner, Coster W, Shila BARRIGA. AM-PAC Short Forms Manual 4.0. Revised 2/2020. Pain Rating:  Indicated pain with PROM to R     Activity Tolerance:   Fair    After treatment patient left in no apparent distress:   Supine in bed, Heels elevated for pressure relief, Call bell within reach, and Side rails x 3    COMMUNICATION/EDUCATION:   The patients plan of care was discussed with: Occupational therapist and Registered nurse. Fall prevention education was provided and the patient/caregiver indicated understanding., Patient/family have participated as able in goal setting and plan of care. , and Patient/family agree to work toward stated goals and plan of care.     Thank you for this referral.  Pramod Sam, PT, DPT   Time Calculation: 20 mins

## 2023-03-20 NOTE — PROGRESS NOTES
Occupational Therapy  3/20/2023    Chart reviewed and discussed with RN. Patient on SBT, requesting to hold OT session at this time. Will reattempt in PM as appropriate/able. Thank you.      Naty Dumont, OTD, OTR/L

## 2023-03-20 NOTE — DIALYSIS
[facility] / [phone]    Orders   Mode: [facility] rounding no indication for change at this time   Blood Flow Rate: 200   Prismasol Dose: 1600    Replacement 1 400  Replacement 2 400   Prismasol Concentrate: 4K /2.5 calcium   Blood Warmer Temp: 37   Net Fluid Removal: 0 at this time     Metrics   BP: 96/59   HR: 84   Access Pressure: -44   Filter Pressure: 138   Return Pressure: 46   TMP: 73   Pressure Drop: 51     Access   Type & Location: [facility]    Comments:          dressing clean, dry and intact, no redness or drainage dated 03/17/23  LINES REVERSED FOR OPTIMAL FLOW                              Labs   HBsAg (Antigen) / date:       03/13/23 negative antigen                                        HBsAb (Antibody) / date: 02/18/23 susceptible    Source: epic     Safety:   Time Out Done:   (Time) 0700   Consent obtained/signed: Verified    Education: Procedural   Primary Nurse Rpt Pre: Merced Orta RN    Primary Nurse Rpt Post: PAYAL Burger RN     Comments / Plan:      [facility] rounding, no indication for change at this time, lines visible, thermax 37*C, pre/post SBAR primary nurse.

## 2023-03-20 NOTE — PROGRESS NOTES
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 Ambrosio Blankenship, Dr. Clarice More, bedside RN, palliative care and SW with plans to observe SBT, persistent liver failure and pancreatitis; if no improvement or worsening over the next week then will discuss timing of withdrawal of support; if catastrophic even between now and next week, family wishes to discontinue LVAD support    Patient was seen and examined by me. I reviewed the note and data. I have discussed and agree with the plan as noted in the ANP note beneath with the following corrections: none. Time of visit: 20 minutes     Marilee Burleson MD PhD  [id number]            RECOMMENDATIONS:  Continue LVAD speed at 4700rpm  Use hydralazine 5mg IV q4h prn MAP>85 or SBP > 110 mmHg  No beta-blockers due to hypotension and RV conditioning prior to LVAD  Cannot tolerate ARB/ARNi due to hypotension and renal function  Cannot tolerate spironolactone due to hyperkalemia and renal function  Cannot tolerate jardiance due to declined renal function  Previously discontinued corlanor due to SVT  Discontinued amio due to intermitted heart blocks under pacemaker  Continue CRRT to keep consistently goal CVP 8-10  Keep K+ >4 and Mg >2  ID recommendations appreciated - pan culture NGTD. Wound consult for drive line   No colchicine per nephrology  Antibiotics per intensivist  Continue to hold coumadin  Heparin gtt on hold for now due to slightly progressive SAH on head CT. Previously D/w intensivist and family, likely more harm from resuming than continuing without  Hepatology recommendations appreciated   Cannot tolerate statins due to abnormal LFT  Management of tube feeds in light of pancreatitis per intensivist  Will add probiotic for diarrhea   Daily dressing changes to Drive line exit site  Pulmonary hygiene- continue SBT   VAD education with caregivers/patient when able by VAD coordinator  D/w  bedside staff      INTERVAL HISTORY:  MAPs 80s, HR 80s  CVP 12  I/O  positive 620cc  -weight up 2lbs  Hgb 8.2; INR 1.4; pBNP down to 7956; procal decreasing  TB 24.4 from 24.3 from 22.6 from 20.7 from 20.6 from 19.4  LFTs similar  Lipase remains > 3000  Lactic up to 2.2 this morning   -Mr. Kendall is awake and waves. Follows some commands. Drowsy;  does not appear to be in pain  -RN expresses concerns about non-healing drive line and frequent diarrhea. IMPRESSION:  Acute on chronic combined systolic/diastolic  heart failure  Stage D, NYHA class IV symptoms   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, LVEF 20% (by echo 1/2023) and 23% (by cMRI 1/3/23)  Cardiac MRI suggestive of ischemic cardiomyopathy  PYP equivocal  S/p HeartMate 3  LVAD-DT (2/15/23)  C/b RV failure requiring Impella RP placed 2/18/23 and discontinued 3/1/23  C/b acute on chronic renal failure, requiring CRRT  C/b hepatic failure, TB 12 (peak 15.3)  C/b lactic acidosis, persistent  C/b persistent septic shock c/b vasodilation and high outputs, on multiple antibiotics, procalcitonin persistently elevated  C/b R SUPA occlusion with small area of matched perfusion defect - subacute infarct c/b left sided hemiparesis  C/b pancreatitis  C/b failure to extubate x 2; anticipating tracheostomy this week  Coronary artery disease  CAD s/p CABG x 2: further disease best managed medically due to small vessel size   At risk of sudden cardiac death  Peripheral arterial disease  Bilateral hydronephrosis s/p donnelly  Cardiac risk factors:  HTN  HL  TRISTAN, STOP-BANG 4  DM2  CKD, stage 3  MOCA from 2/9 19/30, consistent with mild cognitive impairment  Hard of hearing  Gastric Neuroendocrine Tumor, elevated gastrin and chromogranin A  Septic shock, improving   Left sided weakness noted night 3/1. +SAH and subacute occlusion distal R cerebral artery         LIFE GOALS: not re-addressed today   Patient's personal goals included: having a few more years with family  Important upcoming milestones or family events: None at this time   The patient identified the following as important for living well: being at home, not being SOB            CXR (3/5/23) mild pulmonary edema. Small pleural effusions and bibasilar airspace opacities.      Head CTA (3/2/23) Occlusion distal right anterior cerebral artery, with associated small area of matched perfusion defect and cortical enhancement, consistent with subacute infarct. Diminutive and irregular right vertebral artery, occluded at the V3-4 junction, age indeterminate extensive multifocal atherosclerosis in the intracranial and extracranial circulation. CARDIAC IMAGING:   Echo (3/13/23)    Left Ventricle: Severely reduced left ventricular systolic function with a visually estimated EF of 25 - 30%. Left ventricle is dilated. Normal wall thickness. Unable to assess wall motion. Right Ventricle: Moderately reduced systolic function. TAPSE is abnormal. TAPSE is 1.1 cm. LVEDD 5.3cm     ECHO- 3/6/23       Left Ventricle: Severely reduced left ventricular systolic function with a visually estimated EF of 15 - 20%. Left ventricle is moderately dilated. LVAD is present. Right Ventricle: Right ventricle is moderately dilated. Mildly reduced systolic function. Echo 2/19/23    Left Ventricle: Severely reduced left ventricular systolic function with a visually estimated EF of 20 - 25%. Not well visualized. Left ventricle size is normal. Increased wall thickness. Unable to assess wall motion. Abnormal diastolic function. LVAD is present. LVAD inflow cannula was not well visualized. Right Ventricle: Not well visualized. Right ventricle is mildly dilated. Moderately reduced systolic function. TAPSE is abnormal.    Mitral Valve: Severe annular calcification at the anterior and posterior leaflets of the mitral valve. Mild regurgitation. Left Atrium: Left atrium is dilated. Right Atrium: Right atrium is dilated. Technical qualifiers: Echo study was technically difficult and technically difficult with poor endocardial visualization. Echo 1/27/23    Left Ventricle: EF by visual approximation is 20%. Left ventricle is mildly dilated. Mitral Valve: Moderately thickened leaflet, at the anterior and posterior leaflets. Moderately calcified leaflet. Moderate regurgitation. Left Atrium: Left atrium is moderately dilated.     Technical qualifiers: Echo study was technically difficult with poor endocardial visualization. Contrast used: Definity. Limited study, not all structures viewed     Echo 1/9/23   Left Ventricle: Severely reduced left ventricular systolic function with a visually estimated EF of 25 - 30%. Left ventricle size is normal. Mildly increased wall thickness. Echo 12/26/22    Left Ventricle: Severely reduced left ventricular systolic function with a visually estimated EF of 25 - 30%. Left ventricle size is normal. Normal wall thickness. There are regional wall motion abnormalities. Grade II diastolic dysfunction with increased LAP. Right Ventricle: Moderately reduced systolic function. TAPSE is abnormal. TAPSE is 1.1 cm. Aortic Valve: Mild stenosis of the aortic valve. AV peak gradient is 13 mmHg. AV peak velocity is 1.8 m/s. Mitral Valve: Not well visualized. Moderate annular calcification at the posterior leaflet of the mitral valve. Mild to moderate regurgitation. Tricuspid Valve: Mildly elevated RVSP. Left Atrium: Left atrium is moderately dilated. 12/8/22    Left Ventricle: Moderately reduced left ventricular systolic function with a visually estimated EF of 35 - 40%. Severe hypokinesis of the following segments: mid anteroseptal, apical anterior, apical septal, apical inferior and apical lateral. Severe hypokinesis of the apex. Mitral Valve: Severely thickened leaflet, at the anterior and posterior leaflets. Severely calcified leaflet, at the anterior and posterior leaflets. Mild annular calcification of the mitral valve. Moderate regurgitation. Left Atrium: Left atrium is mildly dilated. Contrast used: Definity. limited study     EKG 12/22/22 ST, Biatria enlargement, marked ST abnormality     LHC 12/6/22  1. Normal LVEDP  2. Severe native multivessel coronary artery disease  3. Patent LIMA to LAD and vein graft to distal RCA  4. Recurrent ISR in OM1 stent with now 60 to 70% restenosis  5.   Recoil of left main and circumflex stent with now recurrent 40 to 50% stenosis. 6.  Progression of ostial left main disease now to about 60% stenosis  7. Progression of disease in jailed first marginal branch now with diffuse 90% stenosis  8. High-grade stenosis in the mid to distal right potential femoral artery treated with 6 x 40 mm impact drug-coated balloon angioplasty to reduce the stenosis to less than 40%        HEMODYNAMICS:  RHC 1/9/23  PA 20/9, RA 3, PCWP 8, CI 1.8     CPEST too ill   6MW 300 feet     LVAD INTERROGATION:  Device interrogated in person  Device function normal, normal flow, no events  LVAD   Pump Speed (RPM): 4700  Pump Flow (LPM): 3.7  MAP: 78  PI (Pulsitility Index): 3.8  Power: 3   Test: Yes  Back Up  at Bedside & Labeled: Yes  Power Module Test: Yes  Driveline Site Care: No  Driveline Dressing: Clean, Dry, and Intact  Testing  Alarms Reviewed: Yes  Back up SC speed: 4700  Back up Low Speed Limit: 4300  Emergency Equipment with Patient?: Yes  Emergency procedures reviewed?: Yes  Drive line site inspected?: No  Drive line intergrity inspected?: Yes  Drive line dressing changed?: No    PHYSICAL EXAM:  Visit Vitals  BP (!) 96/59 Comment: arterial line reading   Pulse 81   Temp 97.7 °F (36.5 °C)   Resp 26   Ht 5' 6\" (1.676 m)   Wt 138 lb 7.2 oz (62.8 kg)   SpO2 100%   BMI 22.35 kg/m²     REVIEW OF SYSTEMS:  Review of Systems   Unable to perform ROS: Acuity of condition.   Able to nod some      Physical Assessment:   General Appearance: awake, lethargic, ill appearing adult male resting in bed in NAD; appears stated age  Eyes: sclera anicteric  Mouth/Throat: moist mucous membranes; oral pharynx clear  Neck: supple; trach  Pulmonary:  clear to auscultation bilaterally; trach/vent  Cardiovascular: regular rate and rhythm; VAD hum  Abdomen: soft, non-tender, distended; hyper active bowel sounds normal  Musculoskeletal: no swelling or deformity; weak on L  Extremities: no edema; weak distal pulses   Skin: warm and dry  Neuro: opens eyes; follows some commands;  limited movement on left side   Psych: unable to assess         PAST MEDICAL HISTORY:  Past Medical History:   Diagnosis Date    CAD (coronary artery disease) 11/10/2016    NSTEMI & 2 stents    Deafness 10/28/2012    DM (diabetes mellitus) ([facility] [address]     Elevated cholesterol     Hypertension     NSTEMI (non-ST elevated myocardial infarction) ([facility] [address] 11/10/2016       PAST SURGICAL HISTORY:  Past Surgical History:   Procedure Laterality Date    COLONOSCOPY N/A 6/28/2018    COLONOSCOPY performed by Roni Song MD at [facility] ENDOSCOPY    COLONOSCOPY N/A 1/18/2023    COLONOSCOPY performed by Nitin Zaidi MD at [address] [place] [address]  11/11/2016    2 stents       FAMILY HISTORY:  Family History   Problem Relation Age of Onset    Heart Disease Father     Heart Attack Father     Hypertension Mother     Elevated Lipids Brother     Elevated Lipids Brother     No Known Problems Sister     Elevated Lipids Brother     No Known Problems Son     No Known Problems Daughter     Anesth Problems Neg Hx        SOCIAL HISTORY:  Social History     Socioeconomic History    Marital status:    Tobacco Use    Smoking status: Never     Passive exposure: Never    Smokeless tobacco: Never   Vaping Use    Vaping Use: Never used   Substance and Sexual Activity    Alcohol use:  Yes     Alcohol/week: 2.0 standard drinks     Types: 1 Cans of beer, 1 Shots of liquor per week     Comment: rarely    Drug use: No    Sexual activity: Yes     Social Determinants of Health     Financial Resource Strain: Medium Risk    Difficulty of Paying Living Expenses: Somewhat hard   Food Insecurity: Food Insecurity Present    Worried About [address] in the Last Year: Never true    Ran Out of Food in the Last Year: Often true       LABORATORY RESULTS:     Labs Latest Ref Rng & Units 3/20/2023 3/19/2023 3/18/2023 3/18/2023 3/17/2023 3/16/2023 3/15/2023   WBC 4.1 - 11.1 K/uL 7.7 6.6 - 7.0 7.7 7.7 -   RBC 4.10 - 5.70 M/uL 2.54(L) 2.14(L) - 2.32(L) 2.39(L) 2.37(L) -   Hemoglobin 12.1 - 17.0 g/dL 8. 2(L) 7. 0(L) - 7. 6(L) 7. 9(L) 7. 7(L) -   Hematocrit 36.6 - 50.3 % 24. 6(L) 21. 9(L) - 23. 2(L) 23. 9(L) 23. 3(L) -   MCV 80.0 - 99.0 FL 96.9 102. 3(H) - 100. 0(H) 100. 0(H) 98.3 -   Platelets 798 - 729 K/uL 205 222 - 244 252 234 -   Lymphocytes 12 - 49 % 9(L) 10(L) - 11(L) 10(L) 9(L) -   Monocytes 5 - 13 % 8 14(H) - 15(H) 15(H) 16(H) -   Eosinophils 0 - 7 % 4 3 - 4 4 6 -   Basophils 0 - 1 % 0 1 - 1 1 1 -   Albumin 3.5 - 5.0 g/dL 3. 3(L) 3.4(L) 3.5 3. 3(L) 3.5 3. 4(L) -   Calcium 8.5 - 10.1 MG/DL 9.7 10.0 10. 5(H) 10. 9(H) 10. 9(H) 10. 6(H) 11. 7(H)   Glucose 65 - 100 mg/dL 135(H) 140(H) 145(H) 81 83 162(H) -   BUN 6 - 20 MG/DL 28(H) 26(H) 29(H) 28(H) 40(H) 65(H) -   Creatinine 0.70 - 1.30 MG/DL 1.63(H) 1.74(H) 1.82(H) 2.06(H) 2.23(H) 3.26(H) -   Sodium 136 - 145 mmol/L 134(L) 138 136 137 134(L) 134(L) -   Potassium 3.5 - 5.1 mmol/L 4.0 4.3 4.4 4.3 4.2 4.2 -   TSH 0.36 - 3.74 uIU/mL - - - - 1.25 - -   LDH 85 - 241 U/L 274(H) 240 - 247(H) 281(H) 289(H) -   Some recent data might be hidden     Lab Results   Component Value Date/Time    TSH 1.25 03/17/2023 02:10 AM    TSH 3.52 01/28/2023 05:26 AM    TSH 2.12 12/27/2022 02:36 PM    TSH 4.80 (H) 12/06/2022 03:53 AM    TSH 5.39 (H) 10/12/2022 09:10 AM    TSH 3.53 02/03/2022 11:47 AM    TSH 5.790 (H) 11/21/2019 04:45 PM    TSH 3.08 06/22/2018 01:53 PM    TSH 4.250 05/26/2015 09:43 AM       ALLERGY:  Allergies   Allergen Reactions    Ambien [Zolpidem] Other (comments)     Causes extreme confusion        CURRENT MEDICATIONS:    Current Facility-Administered Medications:     0.9% sodium chloride infusion 250 mL, 250 mL, IntraVENous, PRN, Silvestre Redmond MD    insulin lispro (HUMALOG) injection, , SubCUTAneous, Q4H, Cathy Sheldon CNS    bicarbonate dialysis (PRISMASOL) BG K 4/Ca 2.5 5000 ml solution, , Extracorporeal, DIALYSIS CONTINUOUS, Jadiel Galvan MD, Last Rate: 1,600 mL/hr at 03/20/23 0900, New Bag at 03/20/23 0900    levETIRAcetam (KEPPRA) injection 500 mg, 500 mg, IntraVENous, Q12H, Rubina Fishman MD, 500 mg at 03/20/23 0814    [COMPLETED] meropenem (MERREM) 1 g in 0.9% sodium chloride 20 mL IV syringe, 1 g, IntraVENous, NOW, 1 g at 03/15/23 1734 **FOLLOWED BY** meropenem (MERREM) 1 g in 0.9% sodium chloride (MBP/ADV) 50 mL MBP, 1 g, IntraVENous, Q12H, Rubina Fishman MD, Last Rate: 16.7 mL/hr at 03/20/23 0602, 1 g at 03/20/23 0602    [Held by provider] insulin NPH (NOVOLIN N, HUMULIN N) injection 10 Units, 10 Units, SubCUTAneous, Q12H, Cathy Sheldon, SLICK, 10 Units at 03/15/23 2112    albumin human 25% (BUMINATE) solution 12.5 g, 12.5 g, IntraVENous, Q12H, Lizette Linton, NP, 12.5 g at 03/20/23 0813    oxyCODONE IR (ROXICODONE) tablet 5 mg, 5 mg, Oral, Q4H PRN, Sherral Ree G, DO, 5 mg at 03/19/23 0817    oxyCODONE IR (ROXICODONE) tablet 10 mg, 10 mg, Oral, Q4H PRN, Rodoluday Ko, DO, 10 mg at 03/18/23 2206    pantoprazole (PROTONIX) 40 mg in 0.9% sodium chloride 10 mL injection, 40 mg, IntraVENous, DAILY, Walker Pleitez MD, 40 mg at 03/20/23 0814    labetaloL (NORMODYNE;TRANDATE) injection 5 mg, 5 mg, IntraVENous, Q4H PRN, Allen MOLINA MD, 5 mg at 03/09/23 0307    [Held by provider] heparin 25,000 units in D5W 250 ml infusion, 400 Units/hr, IntraVENous, CONTINUOUS, Lizette Linton, NP, Stopped at 03/10/23 1350    0.9% sodium chloride infusion, 11 mL/hr, IntraVENous, CONTINUOUS, Juan Jose Johnson MD, Last Rate: 11 mL/hr at 03/19/23 2009, 11 mL/hr at 03/19/23 2009    albumin human 25% (BUMINATE) solution 25 g, 25 g, IntraVENous, DIALYSIS PRN, Joseph Pelayo MD, 25 g at 03/13/23 0539    hydrALAZINE (APRESOLINE) 20 mg/mL injection 5 mg, 5 mg, IntraVENous, Q6H PRN, Herb Mcintyre MD, 5 mg at 03/10/23 1316    hydrALAZINE (APRESOLINE) tablet 5 mg, 5 mg, Oral, PRN, Allie Horta Izzy Lassiter MD    aspirin chewable tablet 81 mg, 81 mg, Per NG tube, DAILY, Piotr Hunter MD, 81 mg at 03/20/23 0814    epoetin heidy-epbx (RETACRIT) injection 10,000 Units, 10,000 Units, SubCUTAneous, Q TUE, THU & SAT, Abou-Assi, Radha Kinney MD, 10,000 Units at 03/18/23 2108    EPINEPHrine (ADRENALIN) 10 mg in 0.9% sodium chloride 250 mL infusion, 0-10 mcg/min, IntraVENous, TITRATE, France Johnson MD, Stopped at 03/05/23 0920    NOREPINephrine (LEVOPHED) 8 mg in 5% dextrose 250mL (32 mcg/mL) infusion, 0.5-16 mcg/min, IntraVENous, TITRATE, France Johnsno MD, Stopped at 03/19/23 2120    [Held by provider] colchicine tablet 0.6 mg, 0.6 mg, Oral, DAILY, Lizette Linton NP, 0.6 mg at 03/14/23 0848    heparin (porcine) 1,000 unit/mL injection 1,700 Units, 1,700 Units, InterCATHeter, DIALYSIS PRN, 1,700 Units at 03/13/23 0819 **AND** heparin (porcine) 1,000 unit/mL injection 1,500 Units, 1,500 Units, InterCATHeter, DIALYSIS PRN, Norma Quiroz DO, 1,500 Units at 03/13/23 0819    balsam peru-castor oiL (VENELEX) ointment, , Topical, BID, Meir Coello MD, Given at 03/20/23 5887    [Held by provider] acetaminophen (TYLENOL) solution 650 mg, 650 mg, Oral, Q4H PRN, Almedia Gowers, MD, 650 mg at 03/14/23 1924    [Held by provider] acetaminophen (TYLENOL) suppository 650 mg, 650 mg, Rectal, Q4H PRN, Almedia Gowers, MD, 650 mg at 02/17/23 2013    HYDROmorphone (DILAUDID) injection 0.5 mg, 0.5 mg, IntraVENous, Q3H PRN, Imelda MOLINA MD, 0.5 mg at 03/19/23 1738    HYDROmorphone (DILAUDID) injection 1 mg, 1 mg, IntraVENous, Q4H PRN, Imelda MOLINA MD, 1 mg at 03/14/23 0848    [Held by provider] heparin 25,000 units in D5W 250 ml infusion, 12-25 Units/kg/hr, IntraVENous, TITRATE, Pillo Montenegro MD, Stopped at 03/08/23 0515    glucose chewable tablet 16 g, 4 Tablet, Oral, PRN, Lizette Linton B, NP    glucagon (GLUCAGEN) injection 1 mg, 1 mg, IntraMUSCular, PRN, Lizette Linton, NP    sodium chloride (NS) flush 5-40 mL, 5-40 mL, IntraVENous, Q8H, Shaila, Lizette B, NP, 10 mL at 03/20/23 0616    sodium chloride (NS) flush 5-40 mL, 5-40 mL, IntraVENous, PRN, Shaila, Lizette B, NP, 40 mL at 02/17/23 1818    naloxone (NARCAN) injection 0.4 mg, 0.4 mg, IntraVENous, PRN, Shaila, Lizette B, NP    ELECTROLYTE REPLACEMENT NOTE: Nurse to review Serum Potassium and Magnesuim levels and Initiate Electrolyte Replacement Protocol as needed, 1 Each, Other, PRN, Shaila, Lizette B, NP    dextrose 10 % infusion 0-250 mL, 0-250 mL, IntraVENous, PRN, Shaila, Lizette B, NP, Last Rate: 150 mL/hr at 02/24/23 0220, 75 mL at 02/24/23 0220    [Held by provider] acetaminophen (TYLENOL) tablet 650 mg, 650 mg, Oral, Q6H PRN, Shaila, Lizette B, NP, 650 mg at 03/13/23 2114    ondansetron (ZOFRAN) injection 4 mg, 4 mg, IntraVENous, Q4H PRN, Shaila, Lizette B, NP, 4 mg at 03/09/23 1212    albuterol-ipratropium (DUO-NEB) 2.5 MG-0.5 MG/3 ML, 3 mL, Nebulization, Q6H PRN, Shaila, Lizette B, NP    bisacodyL (DULCOLAX) suppository 10 mg, 10 mg, Rectal, DAILY PRN, Shaila, Lizette B, NP, 10 mg at 02/22/23 0839    dexmedeTOMidine in 0.9 % NaCl (PRECEDEX) 400 mcg/100 mL (4 mcg/mL) infusion soln, 0.1-1.5 mcg/kg/hr, IntraVENous, TITRATE, Jose Johnson MD, Stopped at 03/08/23 2300    PATIENT CARE TEAM:  Patient Care Team:  Pedrito Parekh MD as PCP - General (Family Medicine)  Pedrito Parekh MD as PCP - [facility] Provider  Norbert Forbes MD (Cardiovascular Disease Physician)  Gagan Morales MD (Gastroenterology)  Danis Wyman MD (Cardiothoracic Surgery)  Brandie Gomes MD (Cardiovascular Disease Physician)  Marlene Victor MD (Nephrology)  Daniel Pastor MD (Pulmonary Disease)  Jennifer Harvey MD ([address] [place] [address] Vascular Surgery)     Thank you for allowing me to participate in this patient's care.     Nura Houston NP   [address] [place] [address], [address]  Phone: [phone]      On this date 3/20/2023, I have spent a total time of  35 minutes personally reviewing new vitals, test results, notes, telemetry/EKG, face to face encounter/physical exam of patient, writing orders, performing medical decision making, and documenting.

## 2023-03-20 NOTE — PROGRESS NOTES
1900: Bedside and Verbal shift change report given to Son Boyd RN (oncoming nurse) by Saul Laws RN (offgoing nurse). Report included the following information SBAR, Intake/Output, MAR, Recent Results, and Cardiac Rhythm NSR . Shift Summary: Pt more awake and interactive overnight; vitals stable- remained off of Levo     0730: Bedside and Verbal shift change report given to La Taylor RN (oncoming nurse) by Son Boyd RN (offgoing nurse). Report included the following information SBAR, Intake/Output, MAR, Recent Results, and Cardiac Rhythm NSR .

## 2023-03-20 NOTE — WOUND CARE
WOCN Note:      Follow up for heels and sacrum. Admitted on 12/22/22 for sepsis, cardiogenic shock, respiratory failure. LVAD 2/15; Impella 2/19/23; CVA 3/1; SAH enlarged 3/2  History of MI, CABG x3, DM, CHF. Assessment:   Eyes open briefly. NG tube feeds. Vasquez & FMS placed 3/20/23. Resting on NARENDRA mattress with heels offloaded by pillows. 1. POA Unstageable Pressure Injury  Dry, stable eschar - unchanged  Betadine in use. 2. Left Heel Deep Tissue Injury  Blood-filled bullae now fully deflated and drying. Will likely flake away. Betadine in use. 3. Sacral deep tissue injury  Almost fully resurfaced/resolved with 2 pinpoints of open pink areas. Venelex and foam applied    4. LVAD drive line site  Devitalized green/brown tissue under drive line and around stitch. No gross purulence; scant amount of green on former dressing. No erythema. Drive line dressing change protocol with CHG cleaning performed by RN. Strip of Opticel AG used to pack under drive line that was damp on one end and then dry end wrapped around drive line. Rest of dressing applied per protocol. Skin prep was used on distant lateral skin trauma from tape. Wound Recommendations:    Sacrum: Apply Balsam peru-castor oil (Venelex) daily and cover with sacral foam   Bilateral heels: paint with betadine daily and allow to air dry; offload heels at all times. DO NOT apply Venelex to heels. LVAD drive line site: cut strip of Opticel Ag and dampen end slightly with saline. Tuck damp end under drive line and onto green tissue. Wrap rest of dry strip around drive line. PI Prevention:  Turn/reposition approximately every 2 hours  Offload heels with heels hanging off end of pillow at all times while in bed. Sacral Foam dressing: lift to assess regularly; change as needed. Discontinue if incontinence is frequently soiling dressing. Low Air Loss mattress: Use only flat sheet and one incontinence pad. No changes in condition to share with provider.      Transition of Care: Plan to follow weekly and as needed while admitted to hospital.      Yojana Rucker, [facility]

## 2023-03-20 NOTE — DIABETES MGMT
Remains on vent with trach  More alert today, even waived at me! BG with feeds: 121-135  NPH has been held since 3/15  Pertinent labs: creat 1.63, eGFR 45, , , Alk Phos 379, Lipase >3000, Lac 2.2    NPH will likely need to resume if enteral feeds resume. If BG rising, please go back to checking Q6h    Will follow at a distance. Please Perfect Serve us if he has any immediate needs.

## 2023-03-20 NOTE — PROGRESS NOTES
Transitions of Care Plan  RUR: 27% - high  Clinical Update: trach w vent support; alert; s/p LVAD; CRRT; tube feeds   Consults: Multiple  Baseline: ambulates w rollator; resides w wife; open with [facility] services  Barriers to Discharge: medical  Disposition: pending medical progress - anticipate LTAC vs IPR vs SNF  Estimated Discharge Date: 2+ days    Clinical update per chart review and/or patient discussed during Interdisciplinary Rounds:     Patient is not medically stable for discharge due to ongoing medical needs. CM continues to follow treatment plan for medical progress and disposition needs.      Disposition:  Pending medical progress    Tara Salguero, MPH  Care Manager [facility]  Available via [employer] [place] or

## 2023-03-20 NOTE — PROGRESS NOTES
0800- Bedside report received. Assessment performed. Dr. Jose M Burrell at bedside. Updates given. Will continue to work on SBT and consult wound care for input on LVAD driveline site. Incontinence care performed. 2724- Patient placed on spont. 0913- RR up to the 30 as RSI up to 120s. Patient placed back on a rate. HR 80s, Sats 100%. Dr. Gallegos  at bedside. Discussed driveline, frequent loose stools. Verbal order to place FMS if needed. 1000- Incontinence care performed. FMS placed. [address] from wound care at bedside. Assessed and placed mepilex on sacrum. Also assessed and dressed LVAD Driveline site. RN stating to cut sterile silver into a half inch strip, wet 1/4 inch of strip and place directly around/ in driveline exit site, then wrap the rest loosely around driveline, then place split gauze (included in kit) and 2x2s (included in kit) on top of site. RN assessed isacc heels and feels like heels are healing well. 1655- Patient placed on spont. 1800- Trach dressing performed. 8614- Patient placed back on rate. Tolerated 2 hours of spont with RS , RR 12-30, HR stable in the 30s, sats of 100 and no physiologic signs of distress. 1900- Bedside and Verbal shift change report given to [facility] RN  (oncoming nurse) by Elsa Andrade RN (offgoing nurse). Report given with SBAR, Kardex, Intake/Output and MAR.

## 2023-03-21 NOTE — PROGRESS NOTES
Problem: Self Care Deficits Care Plan (Adult)  Goal: *Acute Goals and Plan of Care (Insert Text)  Description:   FUNCTIONAL STATUS PRIOR TO ADMISSION: Patient required minimal assistance for basic and instrumental ADLs. Used rollator for functional mobility, had HHA and HH OT/PT upon discharge. HOME SUPPORT: The patient lived with wife and family members. Occupational Therapy Goals  Initiated 1/30/2023; Goals remain the same, LVAD scheduled for 2/15  1. Patient will perform grooming with supervision/set-up within 7 day(s). 2.  Patient will perform upper body dressing with supervision/set-up within 7 day(s). 3.  Patient will perform lower body dressing with supervision/set-up within 7 day(s). 4.  Patient will perform all aspects of toileting with supervision/set-up within 7 day(s). 5.  Patient will utilize energy conservation techniques during functional activities with verbal cues within 7 day(s). Occupational Therapy Goals  Initiated 3/13/2023  1. Patient will perform ADLs standing 5 mins without fatigue or LOB with maximal assistance  within 7 day(s). 2.  Patient will perform lower body ADLs with maximal assistance  within 7 day(s). 3.  Patient will perform upper body ADLs with maximal assistance within 7 day(s). 4.  Patient will perform toilet transfers with maximal assistance within 7 day(s). 5.  Patient will perform all aspects of toileting with maximal assistance within 7 day(s). 6.  Patient will participate in cardiac/sternal upper extremity therapeutic exercise/activities to increase independence with ADLs with maximal assistance for 5 minutes within 7 day(s). 7.  Patient will perform VAD switchover routine as part of ADL routine (including batteries<>batteries, battery clip<>battery clip, mock SC<>SC) with maximal assistance within 7 days. 8. Patient will participate in [address] [email or web address] and/or [facility] when appropriate within 7 days. Goals updated 3/20/2023  1. Patient will perform grooming in chair position supine in bed with maximal assistance  within 7 day(s). 2.  Patient will attend to L/R visual fields consistently in prep for ADLs with maximal cueing within 7 day(s). 3.  Patient will perform upper body ADLs with maximal assistance via sophia dressing techniques within 7 day(s). 4.  Patient will perform toilet transfers with maximal assistance within 7 day(s). 5.  Patient will perform all aspects of toileting with maximal assistance within 7 day(s). 6.  Patient will participate in cardiac/sternal upper extremity therapeutic exercise/activities to increase independence with ADLs with maximal assistance for 5 minutes within 7 day(s). 7.  Patient will perform VAD switchover routine as part of ADL routine (including batteries<>batteries, battery clip<>battery clip, mock SC<>SC) with maximal assistance within 7 days. 8. Patient will participate in [address] [place] [address] [employer] and/or [address] [place] [address] when appropriate within 7 days. Outcome: Progressing Towards Goal     OCCUPATIONAL THERAPY TREATMENT  Patient: Leandra Carver (76 y.o. male)  Date: 3/21/2023  Diagnosis: CHF (congestive heart failure) ([facility]) [I50.9] <principal problem not specified>  Procedure(s) (LRB):  LEFT GROIN RP IMPELLA INSERTION, KIARRA BY DR Jose Luis Orona (Left) 31 Days Post-Op  Precautions: Fall (mindful movement)  Chart, occupational therapy assessment, plan of care, and goals were reviewed. ASSESSMENT  Patient continues with skilled OT services and is slowly progressing towards goals. Pt's performance of ADL/IADL tasks continues to be limited at this time by impaired sitting balance, LUE weakness, RUE tone/clonus, poor head/neck control, limited attention to task, poor activity tolerance, and generalized weakness/deconditioning. Pt received with bed in modified chair position and PT present for tx session.  Pt with fair visual attention at beginning of session, though becoming more drowsy as session continued. Max manual and visual cues required to facilitate midline orientation of head/neck in supported sitting and pt unable to hold when physical assist removed. Pt followed ~20% of commands given today and required total A, [place] assist, and proximal stability to engage in basic grooming task with RUE. Painful hypertonicity and clonus continues to be present in RUE and able to detect trace muscle activation in L hand today. Pt repositioned in supine and left with all needs met. RN notified. Current Level of Function Impacting Discharge (ADLs): total A    Other factors to consider for discharge: PLOF, new LVAD, CRRT, new trach, vent         PLAN :  Patient continues to benefit from skilled intervention to address the above impairments. Continue treatment per established plan of care to address goals. Recommendation for discharge: (in order for the patient to meet his/her long term goals)  To be determined: pending progress, will require some level of rehab    This discharge recommendation:  Has been made in collaboration with the attending provider and/or case management    IF patient discharges home will need the following DME: TBD       SUBJECTIVE:   Patient did not verbalize    OBJECTIVE DATA SUMMARY:   Cognitive/Behavioral Status:  Neurologic State: Drowsy  Orientation Level: Unable to verbalize  Cognition: Decreased command following;Decreased attention/concentration  Perception: Cues to attend left visual field;Cues to attend to left side of body;Cues to maintain midline in sitting; Tactile;Verbal;Visual  Perseveration: No perseveration noted  Safety/Judgement: Decreased awareness of environment    Functional Mobility and Transfers for ADLs:  Bed Mobility:  Supine to Sit: Total assistance (bed mechanics used)    Transfers:             Balance:       ADL Intervention:       Grooming  Grooming Assistance: Total assistance(dependent)  Position Performed:  (bed in modified chair)  Washing Face:  Total assistance (dependent); Proximal stability ([place] assist)  Cues: Verbal cues provided;Visual cues provided; Tactile cues provided;Physical assistance              Cognitive Retraining  Safety/Judgement: Decreased awareness of environment    Pain:  Pt wincing when PROM provided to BLE and at end range of PROM of LUE    Activity Tolerance:   Poor    After treatment patient left in no apparent distress:   Call bell within reach and Side rails x 3, bed in modified chair position     COMMUNICATION/COLLABORATION:   The patients plan of care was discussed with: Physical therapy assistant and Registered nurse.      MARJAN Biggs, OTR/L  Time Calculation: 15 mins

## 2023-03-21 NOTE — PROGRESS NOTES
RENAL  PROGRESS NOTE        Subjective:    Seen and examined on CVVH  agitated  Objective:   VITALS SIGNS:    Visit Vitals  BP (!) 96/59 Comment: arterial line reading   Pulse 84   Temp 98.1 °F (36.7 °C)   Resp 21   Ht 5' 6\" (1.676 m)   Wt 61.6 kg (135 lb 12.9 oz)   SpO2 100%   BMI 21.92 kg/m²       O2 Device: Tracheostomy, Ventilator   O2 Flow Rate (L/min): 20 l/min   Temp (24hrs), Av.2 °F (36.8 °C), Min:97.6 °F (36.4 °C), Max:98.8 °F (37.1 °C)         PHYSICAL EXAM:  Intubated  alert    DATA REVIEW:     INTAKE / OUTPUT:   Last shift:       07 - 1900  In: 97.7 [I.V.:27.7]  Out: 174.3 [Drains:50]  Last 3 shifts:  190 -  0700  In: 2729.5 [I.V.:569.5]  Out: 3227.5 [Drains:50]    Intake/Output Summary (Last 24 hours) at 3/21/2023 0907  Last data filed at 3/21/2023 0859  Gross per 24 hour   Intake 1796.5 ml   Output 2196 ml   Net -399.5 ml           LABS:   Recent Labs     23  0444 23  0212   WBC 9.4 7.7 6.6   HGB 8.0* 8.2* 7.0*   HCT 26.1* 24.6* 21.9*    205 222       Recent Labs     23  0444 23  0212    134* 138   K 4.5 4.0 4.3    105 105   CO2 25 25 26   * 135* 140*   BUN 26* 28* 26*   CREA 1.50* 1.63* 1.74*   CA 9.7 9.7 10.0   MG 3.1* 2.9* 2.9*   PHOS 2.7 2.8 2.1*   ALB 3.4* 3.3* 3.4*   TBILI 24.3* 24.4* 24.0*   * 112* 123*   INR 1.4* 1.4* 1.8*           Assessment:  TANNER on CKD-3a: Suspect cardiorenal effects initially. Now has LVAD and  POST OP ATN. Anuric->Requiring CRRT>>iHD-> back to CRRT 3/15/23. left IJ Hermelindo catheter was last replaced on 3/11/2023 by intensivist     hypercalcemia/immobilization-    Elevated LFTs/Hyperbilirubinemia   pancreatitis- with elevated lipase;    Ischemic CM: Recurrent exacerbations. EF 20%. S/p LVAD on 2/15.  Required Impella RP for RV support  CVA  Sepsis  BPH   Well differentiated NET Grade 1: noted on EGD 23  Anemia 2 to CKD:  s/p PRBCS  Left UE basilic and radial vein thrombus  Thrombocytopenia  Myopathy  Hypermagnesemia        Plan/Recommendations:  Ct CVVH-> 4K Prismasol bags.  Factor rate at 0cc/hr  Weaning Levophed   Will use Biphosphonate if calcium up again   Will follow  Dion Mckinnon MD

## 2023-03-21 NOTE — PROGRESS NOTES
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 Alice Vargas, Dr. Noemi Horne, bedside RN, palliative care and SW with plans to observe SBT, persistent liver failure and pancreatitis; if no improvement or worsening over the next week then will discuss timing of withdrawal of support; if catastrophic even between now and next week, family wishes to discontinue LVAD support  Met with wife today, they are leaning towards LVAD discontinuation    Patient was seen and examined by me. I reviewed the note and data. I have discussed and agree with the plan as noted in the ANP note beneath with the following corrections: none. Time of visit: 20 minutes     Kwadwo Sanabria MD PhD  Jeniffer Carmen [phone]               RECOMMENDATIONS:  Continue LVAD speed at 4700rpm  Use hydralazine 5mg IV q4h prn MAP>85 or SBP > 110 mmHg  No beta-blockers due to hypotension and RV condition prior to LVAD  Cannot tolerate ARB/ARNi due to hypotension and renal function  Cannot tolerate spironolactone due to hyperkalemia and renal function  Cannot tolerate jardiance due to declined renal function  Previously discontinued corlanor due to SVT  Discontinued amio due to intermitted heart blocks under pacemaker  Continue CRRT to keep consistently goal CVP 8-10  Keep K+ >4 and Mg >2  ID recommendations appreciated - pan culture NGTD. Wound consult for drive line completed. Appreciate rec's  No colchicine per nephrology  Antibiotics per intensivist  Continue to hold coumadin  AC on hold for now due to slightly progressive SAH on head CT. Previously D/w intensivist and family, likely more harm from resuming than continuing without. Time to repeat head CT? Hepatology recommendations appreciated   Cannot tolerate statins due to abnormal LFT  Management of tube feeds in light of pancreatitis per intensivist  Probiotic for diarrhea   Daily dressing changes to Drive line exit site  Pulmonary hygiene- continue SBT   VAD education with caregivers/patient when able by VAD coordinator  D/w  bedside staff      INTERVAL HISTORY:  MAPs 70s, HR 80s  CVP 12  I/O  even  -weight down 3lbs  Hgb 8.0; INR 1.4; pBNP down to 6431; procal steady  TB staying around 24  LFTs similar  Lipase remains > 3000  Lactic down to 1.9 this morning   Creat down to 1.5; Mg 3.1  -Mr. Kendall is awake and waves. Follows some commands.  More awake than yesterday       IMPRESSION:  Acute on chronic combined systolic/diastolic  heart failure  Stage D, NYHA class IV symptoms   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, LVEF 20% (by echo 1/2023) and 23% (by cMRI 1/3/23)  Cardiac MRI suggestive of ischemic cardiomyopathy  PYP equivocal  S/p HeartMate 3  LVAD-DT (2/15/23)  C/b RV failure requiring Impella RP placed 2/18/23 and discontinued 3/1/23  C/b acute on chronic renal failure, requiring CRRT  C/b hepatic failure, TB 12 (peak 15.3)  C/b lactic acidosis, persistent  C/b persistent septic shock c/b vasodilation and high outputs, on multiple antibiotics, procalcitonin persistently elevated  C/b R SUPA occlusion with small area of matched perfusion defect - subacute infarct c/b left sided hemiparesis  C/b pancreatitis  C/b failure to extubate x 2; anticipating tracheostomy this week  Coronary artery disease  CAD s/p CABG x 2: further disease best managed medically due to small vessel size   At risk of sudden cardiac death  Peripheral arterial disease  Bilateral hydronephrosis s/p donnelly  Cardiac risk factors:  HTN  HL  TRISTAN, STOP-BANG 4  DM2  CKD, stage 3  MOCA from 2/9 19/30, consistent with mild cognitive impairment  Hard of hearing  Gastric Neuroendocrine Tumor, elevated gastrin and chromogranin A  Septic shock, improving   Left sided weakness noted night 3/1. +SAH and subacute occlusion distal R cerebral artery   Poorly healing wounds:  sacral deep tissue; left heel deep tissue; driveline site         LIFE GOALS: not re-addressed today   Patient's personal goals included: having a few more years with family  Important upcoming milestones or family events: None at this time   The patient identified the following as important for living well: being at home, not being SOB            CXR (3/5/23) mild pulmonary edema. Small pleural effusions and bibasilar airspace opacities.      Head CTA (3/2/23) Occlusion distal right anterior cerebral artery, with associated small area of matched perfusion defect and cortical enhancement, consistent with subacute infarct. Diminutive and irregular right vertebral artery, occluded at the V3-4 junction, age indeterminate extensive multifocal atherosclerosis in the intracranial and extracranial circulation. CARDIAC IMAGING:   Echo (3/13/23)    Left Ventricle: Severely reduced left ventricular systolic function with a visually estimated EF of 25 - 30%. Left ventricle is dilated. Normal wall thickness. Unable to assess wall motion. Right Ventricle: Moderately reduced systolic function. TAPSE is abnormal. TAPSE is 1.1 cm. LVEDD 5.3cm     ECHO- 3/6/23       Left Ventricle: Severely reduced left ventricular systolic function with a visually estimated EF of 15 - 20%. Left ventricle is moderately dilated. LVAD is present. Right Ventricle: Right ventricle is moderately dilated. Mildly reduced systolic function. Echo 2/19/23    Left Ventricle: Severely reduced left ventricular systolic function with a visually estimated EF of 20 - 25%. Not well visualized. Left ventricle size is normal. Increased wall thickness. Unable to assess wall motion. Abnormal diastolic function. LVAD is present. LVAD inflow cannula was not well visualized. Right Ventricle: Not well visualized. Right ventricle is mildly dilated. Moderately reduced systolic function. TAPSE is abnormal.    Mitral Valve: Severe annular calcification at the anterior and posterior leaflets of the mitral valve. Mild regurgitation. Left Atrium: Left atrium is dilated. Right Atrium: Right atrium is dilated. Technical qualifiers: Echo study was technically difficult and technically difficult with poor endocardial visualization. Echo 1/27/23    Left Ventricle: EF by visual approximation is 20%. Left ventricle is mildly dilated. Mitral Valve: Moderately thickened leaflet, at the anterior and posterior leaflets. Moderately calcified leaflet. Moderate regurgitation.     Left Atrium: Left atrium is moderately dilated. Technical qualifiers: Echo study was technically difficult with poor endocardial visualization. Contrast used: Definity. Limited study, not all structures viewed     Echo 1/9/23   Left Ventricle: Severely reduced left ventricular systolic function with a visually estimated EF of 25 - 30%. Left ventricle size is normal. Mildly increased wall thickness. Echo 12/26/22    Left Ventricle: Severely reduced left ventricular systolic function with a visually estimated EF of 25 - 30%. Left ventricle size is normal. Normal wall thickness. There are regional wall motion abnormalities. Grade II diastolic dysfunction with increased LAP. Right Ventricle: Moderately reduced systolic function. TAPSE is abnormal. TAPSE is 1.1 cm. Aortic Valve: Mild stenosis of the aortic valve. AV peak gradient is 13 mmHg. AV peak velocity is 1.8 m/s. Mitral Valve: Not well visualized. Moderate annular calcification at the posterior leaflet of the mitral valve. Mild to moderate regurgitation. Tricuspid Valve: Mildly elevated RVSP. Left Atrium: Left atrium is moderately dilated. 12/8/22    Left Ventricle: Moderately reduced left ventricular systolic function with a visually estimated EF of 35 - 40%. Severe hypokinesis of the following segments: mid anteroseptal, apical anterior, apical septal, apical inferior and apical lateral. Severe hypokinesis of the apex. Mitral Valve: Severely thickened leaflet, at the anterior and posterior leaflets. Severely calcified leaflet, at the anterior and posterior leaflets. Mild annular calcification of the mitral valve. Moderate regurgitation. Left Atrium: Left atrium is mildly dilated. Contrast used: Definity. limited study     EKG 12/22/22 ST, Biatria enlargement, marked ST abnormality     LHC 12/6/22  1. Normal LVEDP  2. Severe native multivessel coronary artery disease  3. Patent LIMA to LAD and vein graft to distal RCA  4.   Recurrent ISR in OM1 stent with now 60 to 70% restenosis  5. Recoil of left main and circumflex stent with now recurrent 40 to 50% stenosis. 6.  Progression of ostial left main disease now to about 60% stenosis  7. Progression of disease in jailed first marginal branch now with diffuse 90% stenosis  8. High-grade stenosis in the mid to distal right potential femoral artery treated with 6 x 40 mm impact drug-coated balloon angioplasty to reduce the stenosis to less than 40%        HEMODYNAMICS:  RHC 1/9/23  PA 20/9, RA 3, PCWP 8, CI 1.8     CPEST too ill   6MW 300 feet     LVAD INTERROGATION:  Device interrogated in person  Device function normal, normal flow, no events  LVAD   Pump Speed (RPM): 4700  Pump Flow (LPM): 3.7  MAP: 68  PI (Pulsitility Index): 3.7  Power: 3   Test: No  Back Up  at Bedside & Labeled: Yes  Power Module Test: No  Driveline Site Care: No  Driveline Dressing: Clean, Dry, and Intact  Testing  Alarms Reviewed: Yes  Back up SC speed: 4700  Back up Low Speed Limit: 4300  Emergency Equipment with Patient?: Yes  Emergency procedures reviewed?: Yes  Drive line site inspected?: No  Drive line intergrity inspected?: Yes  Drive line dressing changed?: No    PHYSICAL EXAM:  Visit Vitals  BP (!) 96/59 Comment: arterial line reading   Pulse 83   Temp 98.5 °F (36.9 °C)   Resp 20   Ht 5' 6\" (1.676 m)   Wt 135 lb 12.9 oz (61.6 kg)   SpO2 100%   BMI 21.92 kg/m²     REVIEW OF SYSTEMS:  Review of Systems   Unable to perform ROS: Acuity of condition.   Able to nod some      Physical Assessment:   General Appearance: awake, ill appearing adult male resting in bed in NAD; appears stated age  Eyes: sclera anicteric  Mouth/Throat: moist mucous membranes; oral pharynx clear  Neck: supple; trach  Pulmonary:  clear to auscultation bilaterally; trach/vent  Cardiovascular: regular rate and rhythm; VAD hum  Abdomen: soft, non-tender, distended; hyper active bowel sounds normal  Musculoskeletal: no swelling or deformity; weak on L  Extremities: 1+ dependent edema; non palpable distal pulses   Skin: warm and dry  Neuro: opens eyes; follows some commands;  limited movement on left side   Psych: unable to assess         PAST MEDICAL HISTORY:  Past Medical History:   Diagnosis Date    CAD (coronary artery disease) 11/10/2016    NSTEMI & 2 stents    Deafness 10/28/2012    DM (diabetes mellitus) ([facility] [address]     Elevated cholesterol     Hypertension     NSTEMI (non-ST elevated myocardial infarction) ([facility] [address] 11/10/2016       PAST SURGICAL HISTORY:  Past Surgical History:   Procedure Laterality Date    COLONOSCOPY N/A 6/28/2018    COLONOSCOPY performed by Libertad Campos MD at [facility] ENDOSCOPY    COLONOSCOPY N/A 1/18/2023    COLONOSCOPY performed by Enmanuel Patel MD at [address] [place] [address]  11/11/2016    2 stents       FAMILY HISTORY:  Family History   Problem Relation Age of Onset    Heart Disease Father     Heart Attack Father     Hypertension Mother     Elevated Lipids Brother     Elevated Lipids Brother     No Known Problems Sister     Elevated Lipids Brother     No Known Problems Son     No Known Problems Daughter     Anesth Problems Neg Hx        SOCIAL HISTORY:  Social History     Socioeconomic History    Marital status:    Tobacco Use    Smoking status: Never     Passive exposure: Never    Smokeless tobacco: Never   Vaping Use    Vaping Use: Never used   Substance and Sexual Activity    Alcohol use:  Yes     Alcohol/week: 2.0 standard drinks     Types: 1 Cans of beer, 1 Shots of liquor per week     Comment: rarely    Drug use: No    Sexual activity: Yes     Social Determinants of Health     Financial Resource Strain: Medium Risk    Difficulty of Paying Living Expenses: Somewhat hard   Food Insecurity: Food Insecurity Present    Worried About Running Out of Food in the Last Year: Never true    Ran Out of Food in the Last Year: Often true       LABORATORY RESULTS:     Labs Latest Ref Rng & Units 3/21/2023 3/20/2023 3/19/2023 3/18/2023 3/18/2023 3/17/2023 3/16/2023   WBC 4.1 - 11.1 K/uL 9.4 7.7 6.6 - 7.0 7.7 7.7   RBC 4.10 - 5.70 M/uL 2.59(L) 2.54(L) 2.14(L) - 2.32(L) 2.39(L) 2.37(L)   Hemoglobin 12.1 - 17.0 g/dL 8. 0(L) 8.2(L) 7. 0(L) - 7. 6(L) 7. 9(L) 7. 7(L)   Hematocrit 36.6 - 50.3 % 26. 1(L) 24. 6(L) 21. 9(L) - 23. 2(L) 23. 9(L) 23. 3(L)   MCV 80.0 - 99.0 . 8(H) 96.9 102. 3(H) - 100. 0(H) 100. 0(H) 98.3   Platelets 847 - 459 K/uL 201 205 222 - 244 252 234   Lymphocytes 12 - 49 % 9(L) 9(L) 10(L) - 11(L) 10(L) 9(L)   Monocytes 5 - 13 % 10 8 14(H) - 15(H) 15(H) 16(H)   Eosinophils 0 - 7 % 3 4 3 - 4 4 6   Basophils 0 - 1 % 1 0 1 - 1 1 1   Albumin 3.5 - 5.0 g/dL 3.4(L) 3. 3(L) 3.4(L) 3.5 3. 3(L) 3.5 3. 4(L)   Calcium 8.5 - 10.1 MG/DL 9.7 9.7 10.0 10. 5(H) 10. 9(H) 10. 9(H) 10. 6(H)   Glucose 65 - 100 mg/dL 120(H) 135(H) 140(H) 145(H) 81 83 162(H)   BUN 6 - 20 MG/DL 26(H) 28(H) 26(H) 29(H) 28(H) 40(H) 65(H)   Creatinine 0.70 - 1.30 MG/DL 1.50(H) 1.63(H) 1.74(H) 1.82(H) 2.06(H) 2.23(H) 3.26(H)   Sodium 136 - 145 mmol/L 136 134(L) 138 136 137 134(L) 134(L)   Potassium 3.5 - 5.1 mmol/L 4.5 4.0 4.3 4.4 4.3 4.2 4.2   TSH 0.36 - 3.74 uIU/mL - - - - - 1.25 -   LDH 85 - 241 U/L 263(H) 274(H) 240 - 247(H) 281(H) 289(H)   Some recent data might be hidden     Lab Results   Component Value Date/Time    TSH 1.25 03/17/2023 02:10 AM    TSH 3.52 01/28/2023 05:26 AM    TSH 2.12 12/27/2022 02:36 PM    TSH 4.80 (H) 12/06/2022 03:53 AM    TSH 5.39 (H) 10/12/2022 09:10 AM    TSH 3.53 02/03/2022 11:47 AM    TSH 5.790 (H) 11/21/2019 04:45 PM    TSH 3.08 06/22/2018 01:53 PM    TSH 4.250 05/26/2015 09:43 AM       ALLERGY:  Allergies   Allergen Reactions    Ambien [Zolpidem] Other (comments)     Causes extreme confusion        CURRENT MEDICATIONS:    Current Facility-Administered Medications:     L.acidophilus-paracasei-S.thermophil-bifidobacter (RISAQUAD) 8 billion cell capsule, 1 Capsule, Nasogastric, DAILY, [place] Colleen GIRALDO NP, 1 Capsule at 03/21/23 0850    insulin lispro (HUMALOG) injection, , SubCUTAneous, Q12H, Justin Wall MD    0.9% sodium chloride infusion 250 mL, 250 mL, IntraVENous, PRN, Julisu Johns MD    bicarbonate dialysis (PRISMASOL) BG K 4/Ca 2.5 5000 ml solution, , Extracorporeal, DIALYSIS CONTINUOUS, Jadiel Galvan MD, Last Rate: 1,600 mL/hr at 03/21/23 0328, New Bag at 03/21/23 0328    levETIRAcetam (KEPPRA) injection 500 mg, 500 mg, IntraVENous, Q12H, Rylie Keenan MD, 500 mg at 03/21/23 0850    [COMPLETED] meropenem (MERREM) 1 g in 0.9% sodium chloride 20 mL IV syringe, 1 g, IntraVENous, NOW, 1 g at 03/15/23 1734 **FOLLOWED BY** meropenem (MERREM) 1 g in 0.9% sodium chloride (MBP/ADV) 50 mL MBP, 1 g, IntraVENous, Q12H, Rylie Keenan MD, Last Rate: 16.7 mL/hr at 03/21/23 0613, 1 g at 03/21/23 9562    [Held by provider] insulin NPH (NOVOLIN N, HUMULIN N) injection 10 Units, 10 Units, SubCUTAneous, Q12H, Cathy Sheldon CNS, 10 Units at 03/15/23 2112    albumin human 25% (BUMINATE) solution 12.5 g, 12.5 g, IntraVENous, Q12H, Lizette Linton NP, 12.5 g at 03/21/23 0851    oxyCODONE IR (ROXICODONE) tablet 5 mg, 5 mg, Oral, Q4H PRN, Jermaine Alanis G, DO, 5 mg at 03/21/23 1308    oxyCODONE IR (ROXICODONE) tablet 10 mg, 10 mg, Oral, Q4H PRN, Norma Gonzalezet, DO, 10 mg at 03/18/23 2206    pantoprazole (PROTONIX) 40 mg in 0.9% sodium chloride 10 mL injection, 40 mg, IntraVENous, DAILY, Walker Pratt MD, 40 mg at 03/21/23 0850    labetaloL (NORMODYNE;TRANDATE) injection 5 mg, 5 mg, IntraVENous, Q4H PRN, Imelda MOLINA MD, 5 mg at 03/09/23 0307    [Held by provider] heparin 25,000 units in D5W 250 ml infusion, 400 Units/hr, IntraVENous, CONTINUOUS, Lizette Linton NP, Stopped at 03/10/23 1350    0.9% sodium chloride infusion, 11 mL/hr, IntraVENous, CONTINUOUS, France Johnson MD, Last Rate: 11 mL/hr at 03/19/23 2009, 11 mL/hr at 03/19/23 2009    albumin human 25% (BUMINATE) solution 25 g, 25 g, IntraVENous, DIALYSIS PRN, Yannick Beach MD, 25 g at 03/13/23 0539    hydrALAZINE (APRESOLINE) 20 mg/mL injection 5 mg, 5 mg, IntraVENous, Q6H PRN, Adriana Broussard MD, 5 mg at 03/10/23 1316    hydrALAZINE (APRESOLINE) tablet 5 mg, 5 mg, Oral, PRN, Adriana Broussard MD    aspirin chewable tablet 81 mg, 81 mg, Per NG tube, DAILY, Masoud Kumar MD, 81 mg at 03/21/23 0850    epoetin heidy-epbx (RETACRIT) injection 10,000 Units, 10,000 Units, SubCUTAneous, Q TUE, THU & SAT, Abou-Assi, Tori Aguilar MD, 10,000 Units at 03/18/23 2108    EPINEPHrine (ADRENALIN) 10 mg in 0.9% sodium chloride 250 mL infusion, 0-10 mcg/min, IntraVENous, TITRATE, Eleonora Johnson MD, Stopped at 03/05/23 0920    NOREPINephrine (LEVOPHED) 8 mg in 5% dextrose 250mL (32 mcg/mL) infusion, 0.5-16 mcg/min, IntraVENous, TITRATE, Eleonora Johnson MD, Stopped at 03/19/23 2120    [Held by provider] colchicine tablet 0.6 mg, 0.6 mg, Oral, DAILY, ShailaLizette costello, NP, 0.6 mg at 03/14/23 0848    heparin (porcine) 1,000 unit/mL injection 1,700 Units, 1,700 Units, InterCATHeter, DIALYSIS PRN, 1,700 Units at 03/13/23 0819 **AND** heparin (porcine) 1,000 unit/mL injection 1,500 Units, 1,500 Units, InterCATHeter, DIALYSIS PRN, April Christelleing, DO, 1,500 Units at 03/13/23 0819    balsam peru-castor oiL (VENELEX) ointment, , Topical, BID, Colby Velásquez MD, Given at 03/21/23 5002    [Held by provider] acetaminophen (TYLENOL) solution 650 mg, 650 mg, Oral, Q4H PRN, Mounika Rodríguez MD, 650 mg at 03/14/23 1924    [Held by provider] acetaminophen (TYLENOL) suppository 650 mg, 650 mg, Rectal, Q4H PRN, Mounika Rodríguez MD, 650 mg at 02/17/23 2013    HYDROmorphone (DILAUDID) injection 0.5 mg, 0.5 mg, IntraVENous, Q3H PRN, Tammy MOLINA MD, 0.5 mg at 03/20/23 2051    HYDROmorphone (DILAUDID) injection 1 mg, 1 mg, IntraVENous, Q4H PRN, Tammy MOLINA MD, 1 mg at 03/14/23 0848    [Held by provider] heparin 25,000 units in D5W 250 ml infusion, 12-25 Units/kg/hr, IntraVENous, TITRATE, Jack Del Toro MD, Stopped at 03/08/23 0515    glucose chewable tablet 16 g, 4 Tablet, Oral, PRN, Shaila, Lizette B, NP    glucagon (GLUCAGEN) injection 1 mg, 1 mg, IntraMUSCular, PRN, Shaila, Lizette B, NP    sodium chloride (NS) flush 5-40 mL, 5-40 mL, IntraVENous, Q8H, Shaila, Lizette B, NP, 10 mL at 03/21/23 1308    sodium chloride (NS) flush 5-40 mL, 5-40 mL, IntraVENous, PRN, Shaila, Lizette B, NP, 40 mL at 02/17/23 1818    naloxone (NARCAN) injection 0.4 mg, 0.4 mg, IntraVENous, PRN, Shaila, Lizette B, NP    ELECTROLYTE REPLACEMENT NOTE: Nurse to review Serum Potassium and Magnesuim levels and Initiate Electrolyte Replacement Protocol as needed, 1 Each, Other, PRN, Shaila, Lizette B, NP    dextrose 10 % infusion 0-250 mL, 0-250 mL, IntraVENous, PRN, Shaila, Lizette B, NP, Last Rate: 150 mL/hr at 02/24/23 0220, 75 mL at 02/24/23 0220    [Held by provider] acetaminophen (TYLENOL) tablet 650 mg, 650 mg, Oral, Q6H PRN, Shaila, Lizette B, NP, 650 mg at 03/13/23 2114    ondansetron (ZOFRAN) injection 4 mg, 4 mg, IntraVENous, Q4H PRN, Shaila, Lizette B, NP, 4 mg at 03/09/23 1212    albuterol-ipratropium (DUO-NEB) 2.5 MG-0.5 MG/3 ML, 3 mL, Nebulization, Q6H PRN, Shaila, Lizette B, NP    bisacodyL (DULCOLAX) suppository 10 mg, 10 mg, Rectal, DAILY PRN, Shaila, Lizette B, NP, 10 mg at 02/22/23 0839    dexmedeTOMidine in 0.9 % NaCl (PRECEDEX) 400 mcg/100 mL (4 mcg/mL) infusion soln, 0.1-1.5 mcg/kg/hr, IntraVENous, TITRATE, Elizabeth, Ashia Dick MD, Stopped at 03/08/23 2300    PATIENT CARE TEAM:  Patient Care Team:  Erin Broderick MD as PCP - General (Family Medicine)  Erin Broderick MD as PCP - [facility]  Jaime Eason MD (Cardiovascular Disease Physician)  Ivonne Tee MD (Gastroenterology)  Krishna Bell MD (Cardiothoracic Surgery)  Fausto Sofia MD (Cardiovascular Disease Physician)  Paola Fleming MD (Nephrology)  Saima De La Garza MD (Pulmonary Disease)  Saray Pickett MD ([address] Vascular Surgery)     Thank you for allowing me to participate in this patient's care. Bhavna Gonzalez NP   [address] [place] [address], [address]  Phone: [phone]      On this date 3/21/2023, I have spent a total time of  25 minutes personally reviewing new vitals, test results, notes, telemetry/EKG, face to face encounter/physical exam of patient, writing orders, performing medical decision making, and documenting.

## 2023-03-21 NOTE — DIALYSIS
[facility] / [phone]    Orders   Mode: [facility] rounding no indication for change at this time   Blood Flow Rate: 200   Prismasol Dose: 1600    Replacement 1 400  Replacement 2 400   Prismasol Concentrate: 4K /2.5 calcium   Blood Warmer Temp: 37   Net Fluid Removal: 0 at this time     Metrics   BP: 75/55 MAP 63   HR: 84   Access Pressure: -46   Filter Pressure: 130   Return Pressure: 37   TMP: 79   Pressure Drop: 53     Access   Type & Location: [facility]    Comments:          dressing clean, dry and intact, no redness or drainage dated 03/21/23  LINES REVERSED FOR OPTIMAL FLOW                              Labs   HBsAg (Antigen) / date:       03/13/23 negative antigen                                        HBsAb (Antibody) / date: 02/18/23 susceptible    Source: epic     Safety:   Time Out Done:   (Time) 0721   Consent obtained/signed: Verified    Education: Procedural   Primary Nurse Rpt Pre: Anjel Mckeon RN    Primary Nurse Rpt Post: Anjel Mckeon RN      Comments / Plan:      [facility] rounding, no indication for change at this time, lines visible, thermax 37*C, pre/post SBAR primary nurse.

## 2023-03-21 NOTE — PROGRESS NOTES
Problem: Diabetes Self-Management  Goal: *Disease process and treatment process  Description: Define diabetes and identify own type of diabetes; list 3 options for treating diabetes. Outcome: Progressing Towards Goal  Goal: *Incorporating nutritional management into lifestyle  Description: Describe effect of type, amount and timing of food on blood glucose; list 3 methods for planning meals. Outcome: Progressing Towards Goal  Goal: *Incorporating physical activity into lifestyle  Description: State effect of exercise on blood glucose levels. Outcome: Progressing Towards Goal  Goal: *Developing strategies to promote health/change behavior  Description: Define the ABC's of diabetes; identify appropriate screenings, schedule and personal plan for screenings. Outcome: Progressing Towards Goal  Goal: *Using medications safely  Description: State effect of diabetes medications on diabetes; name diabetes medication taking, action and side effects. Outcome: Progressing Towards Goal  Goal: *Monitoring blood glucose, interpreting and using results  Description: Identify recommended blood glucose targets  and personal targets. Outcome: Progressing Towards Goal  Goal: *Prevention, detection, treatment of acute complications  Description: List symptoms of hyper- and hypoglycemia; describe how to treat low blood sugar and actions for lowering  high blood glucose level. Outcome: Progressing Towards Goal  Goal: *Prevention, detection and treatment of chronic complications  Description: Define the natural course of diabetes and describe the relationship of blood glucose levels to long term complications of diabetes.   Outcome: Progressing Towards Goal  Goal: *Developing strategies to address psychosocial issues  Description: Describe feelings about living with diabetes; identify support needed and support network  Outcome: Progressing Towards Goal  Goal: *Insulin pump training  Outcome: Progressing Towards Goal  Goal: *Sick day guidelines  Outcome: Progressing Towards Goal  Goal: *Patient Specific Goal (EDIT GOAL, INSERT TEXT)  Outcome: Progressing Towards Goal     Problem: Patient Education: Go to Patient Education Activity  Goal: Patient/Family Education  Outcome: Progressing Towards Goal     Problem: Pressure Injury - Risk of  Goal: *Prevention of pressure injury  Description: Document Mike Scale and appropriate interventions in the flowsheet. Outcome: Progressing Towards Goal  Note: Pressure Injury Interventions:  Sensory Interventions: Assess changes in LOC, Assess need for specialty bed, Float heels    Moisture Interventions: Apply protective barrier, creams and emollients, Assess need for specialty bed    Activity Interventions: Increase time out of bed, Pressure redistribution bed/mattress(bed type), PT/OT evaluation    Mobility Interventions: Pressure redistribution bed/mattress (bed type), PT/OT evaluation    Nutrition Interventions: Document food/fluid/supplement intake, Discuss nutritional consult with provider    Friction and Shear Interventions: Minimize layers                Problem: Patient Education: Go to Patient Education Activity  Goal: Patient/Family Education  Outcome: Progressing Towards Goal     Problem: Falls - Risk of  Goal: *Absence of Falls  Description: Document Bertrum Mast Fall Risk and appropriate interventions in the flowsheet.   Outcome: Progressing Towards Goal  Note: Fall Risk Interventions:  Mobility Interventions: PT Consult for mobility concerns, PT Consult for assist device competence    Mentation Interventions: Door open when patient unattended, More frequent rounding    Medication Interventions: Evaluate medications/consider consulting pharmacy    Elimination Interventions: Call light in reach    History of Falls Interventions: Door open when patient unattended         Problem: Patient Education: Go to Patient Education Activity  Goal: Patient/Family Education  Outcome: Progressing Towards Goal Problem: Pain  Goal: *Control of Pain  Outcome: Progressing Towards Goal  Goal: *PALLIATIVE CARE:  Alleviation of Pain  Outcome: Progressing Towards Goal     Problem: Patient Education: Go to Patient Education Activity  Goal: Patient/Family Education  Outcome: Progressing Towards Goal     Problem: Patient Education: Go to Patient Education Activity  Goal: Patient/Family Education  Outcome: Progressing Towards Goal     Problem: Patient Education: Go to Patient Education Activity  Goal: Patient/Family Education  Outcome: Progressing Towards Goal     Problem: Nutrition Deficit  Goal: *Optimize nutritional status  Outcome: Progressing Towards Goal     Problem: Ventilator Management  Goal: *Adequate oxygenation and ventilation  Outcome: Progressing Towards Goal  Goal: *Patient maintains clear airway/free of aspiration  Outcome: Progressing Towards Goal  Goal: *Absence of infection signs and symptoms  Outcome: Progressing Towards Goal  Goal: *Normal spontaneous ventilation  Outcome: Progressing Towards Goal     Problem: Patient Education: Go to Patient Education Activity  Goal: Patient/Family Education  Outcome: Progressing Towards Goal     Problem: Patient Education: Go to Patient Education Activity  Goal: Patient/Family Education  Outcome: Progressing Towards Goal     Problem: LVAD Post-op Day 1  Goal: Off Pathway (Use only if patient is Off Pathway)  Outcome: Progressing Towards Goal  Goal: Activity/Safety  Outcome: Progressing Towards Goal  Goal: Consults, if ordered  Outcome: Progressing Towards Goal  Goal: Diagnostic Test/Procedures  Outcome: Progressing Towards Goal  Goal: Nutrition/Diet  Outcome: Progressing Towards Goal  Goal: Medications  Outcome: Progressing Towards Goal  Goal: Respiratory  Outcome: Progressing Towards Goal  Goal: Treatments/Interventions/Procedures  Outcome: Progressing Towards Goal  Goal: Psychosocial  Outcome: Progressing Towards Goal  Goal: *Hemodynamically stable without vasoactive medications  Outcome: Progressing Towards Goal  Goal: *Lungs clear or at baseline  Outcome: Progressing Towards Goal  Goal: *Adequate oxygenation  Outcome: Progressing Towards Goal  Goal: *Mechanical ventilation discontinued  Outcome: Progressing Towards Goal  Goal: *Optimal pain control at patient's stated goal  Outcome: Progressing Towards Goal  Goal: *Demonstrates progressive activity  Outcome: Progressing Towards Goal  Goal: *Tolerating diet  Outcome: Progressing Towards Goal  Goal: *Level of consciousness returns to baseline  Outcome: Progressing Towards Goal  Goal: *LVAD parameters within set limits  Outcome: Progressing Towards Goal     Problem: LVAD Post-op Day 2  Goal: Off Pathway (Use only if patient is Off Pathway)  Outcome: Progressing Towards Goal  Goal: Activity/Safety  Outcome: Progressing Towards Goal  Goal: Consults, if ordered  Outcome: Progressing Towards Goal  Goal: Diagnostic Test/Procedures  Outcome: Progressing Towards Goal  Goal: Nutrition/Diet  Outcome: Progressing Towards Goal  Goal: Medications  Outcome: Progressing Towards Goal  Goal: Discharge Planning  Outcome: Progressing Towards Goal  Goal: Respiratory  Outcome: Progressing Towards Goal  Goal: Treatments/Interventions/Procedures  Outcome: Progressing Towards Goal  Goal: Psychosocial  Outcome: Progressing Towards Goal  Goal: *Hemodynamically stable without vasoactive medications  Outcome: Progressing Towards Goal  Goal: *Lungs clear or at baseline  Outcome: Progressing Towards Goal  Goal: *Adequate oxygenation  Outcome: Progressing Towards Goal  Goal: *Optimal pain control at patient's stated goal  Outcome: Progressing Towards Goal  Goal: *Demonstrates progressive activity  Outcome: Progressing Towards Goal  Goal: *Tolerating diet  Outcome: Progressing Towards Goal  Goal: *LVAD parameters within set limits  Outcome: Progressing Towards Goal     Problem: Nutrition Deficit  Goal: *Optimize nutritional status  Outcome: Progressing Towards Goal

## 2023-03-21 NOTE — PROGRESS NOTES
Problem: Mobility Impaired (Adult and Pediatric)  Goal: *Acute Goals and Plan of Care (Insert Text)  Description: FUNCTIONAL STATUS PRIOR TO ADMISSION: Patient was modified independent using a rollator for functional mobility. Pt recently d/c home after previous hospital stay. Able to ambulate community distances. HOME SUPPORT PRIOR TO ADMISSION: The patient lived with spouse but did not require assist.    Physical Therapy Goals  Initiated 3/12/2023-- continue all goals 3/20/2023  1. Patient will move from supine to sit and sit to supine , scoot up and down, and roll side to side in bed with maximal assistance within 7 days. 2.  Patient will sit EOB x3 minutes with max assist within 7 days. 3.  Patient will follow 50% of commands for participation in AROM while supine within 7 days. 4.  Patient will tolerate bed in chair position 30 min BID within 7 days. 5.  Family will be independent and compliant with in PROM for all extremities within 7 days. Updated 2/13/2023  1. Patient will move from supine to sit and sit to supine in bed with modified independence within 7 day(s). MET 2/13  2. Patient will transfer from bed to chair and chair to bed with modified independence using the least restrictive device within 7 day(s). MET 2/13  3. Patient will perform sit to stand with modified independence within 7 day(s). MET 2/13  4. Patient will ambulate with modified independence for 300 feet with the least restrictive device within 7 day(s). MET 2/13  5. Patient will ascend/descend 12 stairs with 1 handrail(s) with supervision/set-up within 7 day(s). 6.  Patient will verbally and functionally recall move in the tube techniques in prep for possible LVAD within 7 days. Physical Therapy Goals  Initiated 1/28/2023-- Goals appropriate and add #6 2/6/2023  1. Patient will move from supine to sit and sit to supine  in bed with modified independence within 7 day(s).     2.  Patient will transfer from bed to chair and chair to bed with modified independence using the least restrictive device within 7 day(s). 3.  Patient will perform sit to stand with modified independence within 7 day(s). 4.  Patient will ambulate with modified independence for 300 feet with the least restrictive device within 7 day(s). 5.  Patient will ascend/descend 12 stairs with 1 handrail(s) with supervision/set-up within 7 day(s). 6.  Patient will verbally and functionally recall move in the tube techniques in prep for possible LVAD within 7 days. Outcome: Progressing Towards Goal       PHYSICAL THERAPY TREATMENT  Patient: Tyron Red (76 y.o. male)  Date: 3/21/2023  Diagnosis: CHF (congestive heart failure) (Formerly [facility] [place]) [I50.9] <principal problem not specified>  Procedure(s) (LRB):  LEFT GROIN RP IMPELLA INSERTION, KIARRA BY DR Rosenda sOuna (Left) 31 Days Post-Op  Precautions: Fall (mindful movement)  Chart, physical therapy assessment, plan of care and goals were reviewed. ASSESSMENT  Patient continues with skilled PT services and is progressing towards goals. Pt eyes open more during session but not consistently with eye contact or tracking. No command following with LE or initiation of movement. Tone noted in right LE, with flaccid left LE. Pt tolerating bed in chair position. Pt with brief head control but fatigued with with task. Pt should increase time in chair position      Current Level of Function Impacting Discharge (mobility/balance): total assist    Other factors to consider for discharge:          PLAN :  Patient continues to benefit from skilled intervention to address the above impairments. Continue treatment per established plan of care. to address goals. Recommendation for discharge: (in order for the patient to meet his/her long term goals)  To be determined:       This discharge recommendation:  Has not yet been discussed the attending provider and/or case management    IF patient discharges home will need the following DME: to be determined (TBD)       SUBJECTIVE:   Patient non verbal     OBJECTIVE DATA SUMMARY:   Critical Behavior:  Neurologic State: Drowsy  Orientation Level: Unable to verbalize  Cognition: Decreased command following, Decreased attention/concentration  Safety/Judgement: Decreased awareness of environment  Functional Mobility Training:  Bed Mobility:     Supine to Sit: Total assistance              Transfers:       Balance:     Ambulation/Gait Training:       Stairs: Therapeutic Exercises:   DF stretch, LAQ  Pain Rating:  With movement of Extremities at end range     Activity Tolerance:   Poor    After treatment patient left in no apparent distress:   Supine in bed, Heels elevated for pressure relief, and bed in chair position     COMMUNICATION/COLLABORATION:   The patients plan of care was discussed with: Occupational therapist and Registered nurse.      Ashlyn Gallego PTA   Time Calculation: 26 mins

## 2023-03-21 NOTE — PROGRESS NOTES
[facility]    ICU TEAM Progress Note    Name: Roselia Cee   : 1950   MRN: [id number]   Date: 3/21/2023      I  Subjective:   Progress Note: 3/21/2023      Reason for ICU Admission: Cardiomyopathy status post LVAD implantation     Interval history:  71year old male PMH as noted above admitted to [facility] on  due to ongoing symptoms secondary to his HFrEF. Treated for possible CAP, and diuresed for acute on chronic HFrEF. Nephrology following for TANNER on CKD 3. AHF team recommended transfer to CVICU for [facility] placement and ongoing LVAD workup, with placement 2/15. Pt extubated , however with development of worsening renal dysfunction overnight and unable to tolerate CRRT so was reintubated and taken for Impella RP placement for right-sided support in a.m. of  and CRRT resumed. Impella removed on 3/1. Overnight 3/1-3/1 CVA noted with SAH.  3/2 SAH enlarged. Held anticoagulation. Failed extubation 3/2. Trach placed 3/7. Transitioned to [facility]. Repeat CTH 3/10 w/ persistent SAH, heparin held. Overnight Events:   No acute event, no issues with CRRT, off pressors, tolerating tube feeding no fever.   Tolerated SBT for 15 minutes today    Active Problem List:     Problem List  Date Reviewed: 2023            Codes Class    Dyslipidemia, goal LDL below 70 ICD-10-CM: E78.5  ICD-9-CM: 272.4         LVAD (left ventricular assist device) present ([facility] ICD-10-CM: Z95.811  ICD-9-CM: V43.21         PAD (peripheral artery disease) ([facility] ICD-10-CM: I73.9  ICD-9-CM: 180.8         Systolic CHF, acute on chronic (HCC) ICD-10-CM: I50.23  ICD-9-CM: 428.23, 428.0         Receiving inotropic medication ICD-10-CM: Z79.899  ICD-9-CM: V49.89         CHF (congestive heart failure) (HCC) ICD-10-CM: I50.9  ICD-9-CM: 428.0         CKD stage 3 due to type 2 diabetes mellitus [facility] ICD-10-CM: E11.22, N18.30  ICD-9-CM: 250.40, 585.3         S/P CABG x 3 ICD-10-CM: Z95.1  ICD-9-CM: V45.81     Overview Signed 7/9/2018 11:20 AM by SCAR Lundberg     Coronary Artery Bypass Grafting x 3, LIMA to LAD, RSVG to OM, RSVG to RCA  Right GSV EVH             Coronary artery disease involving native coronary artery of native heart without angina pectoris ICD-10-CM: I25.10  ICD-9-CM: 414.01         Hypertension, essential ICD-10-CM: I10  ICD-9-CM: 401.9         Colon cancer screening ICD-10-CM: Z12.11  ICD-9-CM: V76.51         Nonrheumatic aortic valve stenosis ICD-10-CM: I35.0  ICD-9-CM: 424.1         Bruit of right carotid artery ICD-10-CM: R09.89  ICD-9-CM: 785. 9         Type 2 diabetes mellitus with hyperglycemia (HCC) ICD-10-CM: E11.65  ICD-9-CM: 250.00         Deafness ICD-10-CM: H91.90  ICD-9-CM: 389.9         Cramp of limb ICD-10-CM: R25.2  ICD-9-CM: 729.82            Past Medical History:      has a past medical history of CAD (coronary artery disease) (11/10/2016), Deafness (10/28/2012), DM (diabetes mellitus) ([facility] Elevated cholesterol, Hypertension, and NSTEMI (non-ST elevated myocardial infarction) ([facility] (11/10/2016). Past Surgical History:      has a past surgical history that includes hx appendectomy; pr unlisted procedure cardiac surgery (11/11/2016); colonoscopy (N/A, 6/28/2018); and colonoscopy (N/A, 1/18/2023). Home Medications:     Prior to Admission medications    Medication Sig Start Date End Date Taking? Authorizing Provider   polyethylene glycol (Miralax) 17 gram/dose powder Take 17 g by mouth daily as needed for Constipation. Yes Provider, Historical   furosemide (LASIX) 20 mg tablet Take 1 Tablet by mouth daily as needed (for weight greater than 120 lb by 2-3 lb or shortness of breath). 1/23/23  Yes Juwan Linton March LUÍS, NP   glipiZIDE (GLUCOTROL) 5 mg tablet Take 1 tablet by mouth twice daily 12/2/22  Yes Beverley Beach MD   ipratropium (ATROVENT) 21 mcg (0.03 %) nasal spray 2 Sprays by Both Nostrils route every twelve (12) hours.  11/21/22  Yes Beverley Beach MD   ergocalciferol (ERGOCALCIFEROL) 1,250 mcg (50,000 unit) capsule Take 1 Capsule by mouth every seven (7) days. Patient taking differently: Take 50,000 Units by mouth every seven (7) days. Mondays 10/14/22  Yes Marguerite Cook MD   Januvia 50 mg tablet Take 1 tablet by mouth once daily 22  Yes Marguerite Cook MD   rosuvastatin (CRESTOR) 20 mg tablet Take 1 Tablet by mouth nightly. 22  Yes Suzette Mckeon MD   aspirin delayed-release 81 mg tablet Take 81 mg by mouth daily. Yes Provider, Historical   zolpidem (AMBIEN) 5 mg tablet Take 1 Tablet by mouth nightly as needed for Sleep. Max Daily Amount: 5 mg. 23   Marguerite Cook MD   nitroglycerin (NITROSTAT) 0.4 mg SL tablet 1 Tablet by SubLINGual route every five (5) minutes as needed for Chest Pain. Up to 3 doses. 22   Stevan Jasmine MD       Allergies/Social/Family History: Allergies   Allergen Reactions    Ambien [Zolpidem] Other (comments)     Causes extreme confusion      Social History     Tobacco Use    Smoking status: Never     Passive exposure: Never    Smokeless tobacco: Never   Substance Use Topics    Alcohol use:  Yes     Alcohol/week: 2.0 standard drinks     Types: 1 Cans of beer, 1 Shots of liquor per week     Comment: rarely      Family History   Problem Relation Age of Onset    Heart Disease Father     Heart Attack Father     Hypertension Mother     Elevated Lipids Brother     Elevated Lipids Brother     No Known Problems Sister     Elevated Lipids Brother     No Known Problems Son     No Known Problems Daughter     Anesth Problems Neg Hx        Review of Systems:   Not able to obtain due to patient medical condition    Objective:   Vital Signs:  Visit Vitals  BP (!) 96/59 Comment: arterial line reading   Pulse 82   Temp 98.5 °F (36.9 °C)   Resp 18   Ht 5' 6\" (1.676 m)   Wt 61.6 kg (135 lb 12.9 oz)   SpO2 100%   BMI 21.92 kg/m²    O2 Flow Rate (L/min): 20 l/min O2 Device: Tracheostomy, Ventilator Temp (24hrs), Av.3 °F (36.8 °C), Min:97.7 °F (36.5 °C), Max:98.8 °F (37.1 °C)    CVP (mmHg): 13 mmHg (03/21/23 1300)      Intake/Output:     Intake/Output Summary (Last 24 hours) at 3/21/2023 1350  Last data filed at 3/21/2023 1300  Gross per 24 hour   Intake 2104.2 ml   Output 2309 ml   Net -204.8 ml       Physical Exam:    General:  Awake, sleepy at times, chronically ill looking on mechanical ventilation and CRRT   Eyes:  Sclera anicteric. Pupils equally round and reactive to light. Mouth/Throat: Mucous membranes normal, oral pharynx clear   Neck: Line in place bilaterally   Lungs:   Coarse crepitation bilaterally   CV:  LVAD hum   Abdomen:   Soft, non-tender. bowel sounds normal. non-distended   Extremities: No cyanosis or edema   Neuro: Open eyes spontaneously, wave and smile, at times follow simple command mainly on the right side, did not appreciate movement on left arm, minimal movement on left leg       LABS AND  DATA: Personally reviewed  Recent Labs     03/21/23 0224 03/20/23 0444   WBC 9.4 7.7   HGB 8.0* 8.2*   HCT 26.1* 24.6*    205     Recent Labs     03/21/23 0224 03/20/23 0444    134*   K 4.5 4.0    105   CO2 25 25   BUN 26* 28*   CREA 1.50* 1.63*   * 135*   CA 9.7 9.7   MG 3.1* 2.9*   PHOS 2.7 2.8     Recent Labs     03/21/23 0224 03/20/23 0444   * 379*   TP 6.3* 6.5   ALB 3.4* 3.3*   GLOB 2.9 3.2   LPSE >3,000* >3,000*     Recent Labs     03/21/23 0224 03/20/23 0444   INR 1.4* 1.4*   PTP 14.0* 14.4*      No results for input(s): PHI, PCO2I, PO2I, FIO2I in the last 72 hours. No results for input(s): CPK, CKMB, TROIQ, BNPP in the last 72 hours.     Hemodynamics:   PAP: PAP Systolic: 44 (42/81/35 7678) CO: CO (l/min): 4.8 l/min (03/10/23 0900)   Wedge: PAOP (PCWP) (mmhg): 36 mmHg (02/03/23 1830) CI: CI (l/min/m2): 2.7 l/min/m2 (03/10/23 0900)   CVP:  CVP (mmHg): 13 mmHg (03/21/23 1300) SVR:       PVR:       Ventilator Settings:  Mode Rate Tidal Volume Pressure FiO2 PEEP   CPAP, Spontaneous 350 ml  10 cm H2O 40 % 5 cm H20     Peak airway pressure: 16 cm H2O    Minute ventilation: 4.93 l/min        MEDS: Reviewed    Chest X-Ray:  CXR Results  (Last 48 hours)                 03/21/23 0436  XR CHEST PORT Final result    Impression:      Decreased small pleural effusions and bibasilar opacities. Narrative:  EXAM:  XR CHEST PORT       INDICATION: LVAD       COMPARISON: 3/19/2023 at 0434 hours       TECHNIQUE: Portable AP semiupright chest view at 0428 hours       FINDINGS: The tracheostomy tube, enteric tube, bilateral IJ catheters, and LVAD   are stable. The cardiomediastinal contours are stable. Small pleural effusions and bibasilar opacities are decreased. There is no   pneumothorax. The bones and upper abdomen are stable. Assessment and Plan:   -Cardiomyopathy status post HeartMate 3 LVAD as destination therapy on April 15, 2023:  -Acute kidney injury on chronic kidney disease requiring CRRT:  - Hepatic congestion with elevated bilirubin:  - Right SUPA ischemic stroke with left hemiparesis  -Acute on chronic hypoxic respiratory failure, status post tracheostomy:  - Pancreatitis:  - Critical illness myopathy:     NEUROLOGICAL: Acute encephalopathy, critical illness polyneuropathy   No acute changes in his neurological finding, left hemiparesis. On aspirin.   Seems that his alertness is improving slowly  PULMONOLOGY: Acute proximal respiratory failure, status post tracheostomy 7 March 2023   -Protective ventilatory strategy  -Daily SBT  -Pulmonary hygiene     CARDIOVASCULAR: Ischemic and nonischemic cardiomyopathy, left ventricular ejection fraction 25 to 30%, status post LVAD 15 February 2023, status post Impella right side removed 1 March 2023, cardiorenal syndrome   -Appreciate heart failure and cardiothoracic surgery input and management  -Continue aspirin  -Off pressors     GASTROINTESTINAL: Hepatic congestion, hyperbilirubinemia, possible cirrhosis, pancreatitis -Continue enteral feeds  -Continue GI prophylaxis  -Total bilirubin continue to trend up, lipase remains elevated     RENAL/ELECTROLYTE/FLUIDS: Acute on chronic renal failure   -Continue hemodialysis with continuous renal replacement therapy, 0 factor  -Strict I's and O's  -Place electrolytes as indicated  -Nephrology following, I appreciate the recommendation and management     ENDOCRINE:   -Maintain blood glucose levels between 140-180   -Sliding scale insulin   HEMATOLOGY/ONCOLOGY: Acute on chronic anemia, gastric neuroendocrine tumor   Transfuse for hemoglobin below 7  Off heparin or other anticoagulation at this time  ID/MICRO:   Patient on meropenem at least since the 16th. Received multiple antibiotic regimen before that. May be able to de-escalate soon, will discuss with heart failure team    DISPOSITION  Stay in ICU    CRITICAL CARE CONSULTANT NOTE  I had a face to face encounter with the patient, reviewed and interpreted patient data including clinical events, labs, images, vital signs, I/O's, and examined patient. I have discussed the case and the plan and management of the patient's care with the consulting services, the bedside nurses and the respiratory therapist.      NOTE OF PERSONAL INVOLVEMENT IN CARE   This patient has a high probability of imminent, clinically significant deterioration, which requires the highest level of preparedness to intervene urgently. I participated in the decision-making and personally managed or directed the management of the following life and organ supporting interventions that required my frequent assessment to treat or prevent imminent deterioration. I personally spent 35 minutes of critical care time. This is time spent at this critically ill patient's bedside actively involved in patient care as well as the coordination of care and discussions with the patient's family. This does not include any procedural time which has been billed separately.     Justin SALMERON Justice Neal M.D.   Staff Intensivist/Pulmonologist  [facility]  3/21/2023

## 2023-03-21 NOTE — PROGRESS NOTES
0800 Bedside shift change report given to [address] (oncoming nurse) by Ronnie Hutson RN (offgoing nurse). Report included the following information SBAR, Kardex, ED Summary, OR Summary, Procedure Summary, Intake/Output, MAR, Accordion, Recent Results, and Cardiac Rhythm NSR . Assessment completed and LVAD settings noted. 1000 PT/OT at bedside- patient in chair position. 80 Dr Miriam Farrell at [address] for SBT today. RN informed RT.    9096 SBT failed due to tachypnea and elevated RSBI.    1700 LVAD dressing changed per wound care orders and per protocol. 1930 Bedside shift change report given to [address] (oncoming nurse) by Peter Santizo RN (offgoing nurse). Report included the following information SBAR, Kardex, ED Summary, OR Summary, Procedure Summary, Intake/Output, MAR, Accordion, Recent Results, and Cardiac Rhythm NSR .

## 2023-03-21 NOTE — PROGRESS NOTES
03/21/23 1123   Weaning Parameters   Spontaneous Breathing Trial Complete Yes  (on Pressure support of 10 PEEP 5)   Resp Rate Observed 40   Ve 4.9      RSBI 189       Patient placed on previous vent settings at this time.

## 2023-03-22 NOTE — PROGRESS NOTES
Problem: Mobility Impaired (Adult and Pediatric)  Goal: *Acute Goals and Plan of Care (Insert Text)  Description: FUNCTIONAL STATUS PRIOR TO ADMISSION: Patient was modified independent using a rollator for functional mobility. Pt recently d/c home after previous hospital stay. Able to ambulate community distances. HOME SUPPORT PRIOR TO ADMISSION: The patient lived with spouse but did not require assist.    Physical Therapy Goals  Initiated 3/12/2023-- continue all goals 3/20/2023  1. Patient will move from supine to sit and sit to supine , scoot up and down, and roll side to side in bed with maximal assistance within 7 days. 2.  Patient will sit EOB x3 minutes with max assist within 7 days. 3.  Patient will follow 50% of commands for participation in AROM while supine within 7 days. 4.  Patient will tolerate bed in chair position 30 min BID within 7 days. 5.  Family will be independent and compliant with in PROM for all extremities within 7 days. Updated 2/13/2023  1. Patient will move from supine to sit and sit to supine in bed with modified independence within 7 day(s). MET 2/13  2. Patient will transfer from bed to chair and chair to bed with modified independence using the least restrictive device within 7 day(s). MET 2/13  3. Patient will perform sit to stand with modified independence within 7 day(s). MET 2/13  4. Patient will ambulate with modified independence for 300 feet with the least restrictive device within 7 day(s). MET 2/13  5. Patient will ascend/descend 12 stairs with 1 handrail(s) with supervision/set-up within 7 day(s). 6.  Patient will verbally and functionally recall move in the tube techniques in prep for possible LVAD within 7 days. Physical Therapy Goals  Initiated 1/28/2023-- Goals appropriate and add #6 2/6/2023  1. Patient will move from supine to sit and sit to supine  in bed with modified independence within 7 day(s).     2.  Patient will transfer from bed to chair and chair to bed with modified independence using the least restrictive device within 7 day(s). 3.  Patient will perform sit to stand with modified independence within 7 day(s). 4.  Patient will ambulate with modified independence for 300 feet with the least restrictive device within 7 day(s). 5.  Patient will ascend/descend 12 stairs with 1 handrail(s) with supervision/set-up within 7 day(s). 6.  Patient will verbally and functionally recall move in the tube techniques in prep for possible LVAD within 7 days. Outcome: Progressing Towards Goal    PHYSICAL THERAPY TREATMENT  Patient: Roselia Cee (76 y.o. male)  Date: 3/22/2023  Diagnosis: CHF (congestive heart failure) ([facility]) [I50.9] <principal problem not specified>  Procedure(s) (LRB):  LEFT GROIN RP IMPELLA INSERTION, KIARRA BY DR Elias Blackwood (Left) 32 Days Post-Op  Precautions: Fall (mindful movement)  Chart, physical therapy assessment, plan of care and goals were reviewed. ASSESSMENT  Patient continues with skilled PT services and is progressing towards goals. Pt with eyes open through most of session. Pt consistently raised right arm with command when alert. No movement noted on command for LE. RN reports pt did move LE earlier. Pt placed bed in chair position  . Current Level of Function Impacting Discharge (mobility/balance): total A     Other factors to consider for discharge:          PLAN :  Patient continues to benefit from skilled intervention to address the above impairments. Continue treatment per established plan of care. to address goals. Recommendation for discharge: (in order for the patient to meet his/her long term goals)  To be determined:       This discharge recommendation:  Has not yet been discussed the attending provider and/or case management    IF patient discharges home will need the following DME: to be determined (TBD)       SUBJECTIVE:   Patient none verbal    OBJECTIVE DATA SUMMARY:   Critical Behavior:  Neurologic State: Drowsy  Orientation Level: Unable to verbalize  Cognition: Follows commands  Safety/Judgement: Decreased awareness of environment  Functional Mobility Training:  Bed Mobility:     Supine to Sit: Total assistance                        Therapeutic Exercises:     LE ROM bilateral not active ROM. Pt with less clonus in right LE. No movement on command   Pt lifting right UE with verbal and visual cues. Cervical ROM in limited range due to lines     Pain Rating:  With ROM    Activity Tolerance:   Poor    After treatment patient left in no apparent distress:   Bed in chair position     COMMUNICATION/COLLABORATION:   The patients plan of care was discussed with: Registered nurse.      Darwin Kelly PTA   Time Calculation: 23 mins

## 2023-03-22 NOTE — PROGRESS NOTES
Problem: Diabetes Self-Management  Goal: *Disease process and treatment process  Description: Define diabetes and identify own type of diabetes; list 3 options for treating diabetes. Outcome: Progressing Towards Goal  Goal: *Incorporating nutritional management into lifestyle  Description: Describe effect of type, amount and timing of food on blood glucose; list 3 methods for planning meals. Outcome: Progressing Towards Goal  Goal: *Incorporating physical activity into lifestyle  Description: State effect of exercise on blood glucose levels. Outcome: Progressing Towards Goal  Goal: *Developing strategies to promote health/change behavior  Description: Define the ABC's of diabetes; identify appropriate screenings, schedule and personal plan for screenings. Outcome: Progressing Towards Goal  Goal: *Using medications safely  Description: State effect of diabetes medications on diabetes; name diabetes medication taking, action and side effects. Outcome: Progressing Towards Goal  Goal: *Monitoring blood glucose, interpreting and using results  Description: Identify recommended blood glucose targets  and personal targets. Outcome: Progressing Towards Goal  Goal: *Prevention, detection, treatment of acute complications  Description: List symptoms of hyper- and hypoglycemia; describe how to treat low blood sugar and actions for lowering  high blood glucose level. Outcome: Progressing Towards Goal  Goal: *Prevention, detection and treatment of chronic complications  Description: Define the natural course of diabetes and describe the relationship of blood glucose levels to long term complications of diabetes.   Outcome: Progressing Towards Goal  Goal: *Developing strategies to address psychosocial issues  Description: Describe feelings about living with diabetes; identify support needed and support network  Outcome: Progressing Towards Goal  Goal: *Insulin pump training  Outcome: Progressing Towards Goal  Goal: *Sick day guidelines  Outcome: Progressing Towards Goal  Goal: *Patient Specific Goal (EDIT GOAL, INSERT TEXT)  Outcome: Progressing Towards Goal     Problem: Patient Education: Go to Patient Education Activity  Goal: Patient/Family Education  Outcome: Progressing Towards Goal     Problem: Pressure Injury - Risk of  Goal: *Prevention of pressure injury  Description: Document Mike Scale and appropriate interventions in the flowsheet. Outcome: Progressing Towards Goal  Note: Pressure Injury Interventions:  Sensory Interventions: Assess changes in LOC, Minimize linen layers    Moisture Interventions: Apply protective barrier, creams and emollients    Activity Interventions: Pressure redistribution bed/mattress(bed type)    Mobility Interventions: Float heels    Nutrition Interventions: Document food/fluid/supplement intake    Friction and Shear Interventions: Minimize layers                Problem: Patient Education: Go to Patient Education Activity  Goal: Patient/Family Education  Outcome: Progressing Towards Goal     Problem: Falls - Risk of  Goal: *Absence of Falls  Description: Document Laverne Fall Risk and appropriate interventions in the flowsheet.   Outcome: Progressing Towards Goal  Note: Fall Risk Interventions:  Mobility Interventions: PT Consult for mobility concerns, PT Consult for assist device competence    Mentation Interventions: Door open when patient unattended, More frequent rounding    Medication Interventions: Evaluate medications/consider consulting pharmacy    Elimination Interventions: Call light in reach    History of Falls Interventions: Door open when patient unattended         Problem: Patient Education: Go to Patient Education Activity  Goal: Patient/Family Education  Outcome: Progressing Towards Goal     Problem: Pain  Goal: *Control of Pain  Outcome: Progressing Towards Goal  Goal: *PALLIATIVE CARE:  Alleviation of Pain  Outcome: Progressing Towards Goal     Problem: Patient Education: Go to Patient Education Activity  Goal: Patient/Family Education  Outcome: Progressing Towards Goal     Problem: Patient Education: Go to Patient Education Activity  Goal: Patient/Family Education  Outcome: Progressing Towards Goal     Problem: Patient Education: Go to Patient Education Activity  Goal: Patient/Family Education  Outcome: Progressing Towards Goal     Problem: Nutrition Deficit  Goal: *Optimize nutritional status  Outcome: Progressing Towards Goal     Problem: Ventilator Management  Goal: *Adequate oxygenation and ventilation  Outcome: Progressing Towards Goal  Goal: *Patient maintains clear airway/free of aspiration  Outcome: Progressing Towards Goal  Goal: *Absence of infection signs and symptoms  Outcome: Progressing Towards Goal  Goal: *Normal spontaneous ventilation  Outcome: Progressing Towards Goal     Problem: Patient Education: Go to Patient Education Activity  Goal: Patient/Family Education  Outcome: Progressing Towards Goal     Problem: Patient Education: Go to Patient Education Activity  Goal: Patient/Family Education  Outcome: Progressing Towards Goal     Problem: LVAD Post-op Day 1  Goal: Off Pathway (Use only if patient is Off Pathway)  Outcome: Progressing Towards Goal  Goal: Activity/Safety  Outcome: Progressing Towards Goal  Goal: Consults, if ordered  Outcome: Progressing Towards Goal  Goal: Diagnostic Test/Procedures  Outcome: Progressing Towards Goal  Goal: Nutrition/Diet  Outcome: Progressing Towards Goal  Goal: Medications  Outcome: Progressing Towards Goal  Goal: Respiratory  Outcome: Progressing Towards Goal  Goal: Treatments/Interventions/Procedures  Outcome: Progressing Towards Goal  Goal: Psychosocial  Outcome: Progressing Towards Goal  Goal: *Hemodynamically stable without vasoactive medications  Outcome: Progressing Towards Goal  Goal: *Lungs clear or at baseline  Outcome: Progressing Towards Goal  Goal: *Adequate oxygenation  Outcome: Progressing Towards Goal  Goal: *Mechanical ventilation discontinued  Outcome: Progressing Towards Goal  Goal: *Optimal pain control at patient's stated goal  Outcome: Progressing Towards Goal  Goal: *Demonstrates progressive activity  Outcome: Progressing Towards Goal  Goal: *Tolerating diet  Outcome: Progressing Towards Goal  Goal: *Level of consciousness returns to baseline  Outcome: Progressing Towards Goal  Goal: *LVAD parameters within set limits  Outcome: Progressing Towards Goal     Problem: LVAD Post-op Day 2  Goal: Off Pathway (Use only if patient is Off Pathway)  Outcome: Progressing Towards Goal  Goal: Activity/Safety  Outcome: Progressing Towards Goal  Goal: Consults, if ordered  Outcome: Progressing Towards Goal  Goal: Diagnostic Test/Procedures  Outcome: Progressing Towards Goal  Goal: Nutrition/Diet  Outcome: Progressing Towards Goal  Goal: Medications  Outcome: Progressing Towards Goal  Goal: Discharge Planning  Outcome: Progressing Towards Goal  Goal: Respiratory  Outcome: Progressing Towards Goal  Goal: Treatments/Interventions/Procedures  Outcome: Progressing Towards Goal  Goal: Psychosocial  Outcome: Progressing Towards Goal  Goal: *Hemodynamically stable without vasoactive medications  Outcome: Progressing Towards Goal  Goal: *Lungs clear or at baseline  Outcome: Progressing Towards Goal  Goal: *Adequate oxygenation  Outcome: Progressing Towards Goal  Goal: *Optimal pain control at patient's stated goal  Outcome: Progressing Towards Goal  Goal: *Demonstrates progressive activity  Outcome: Progressing Towards Goal  Goal: *Tolerating diet  Outcome: Progressing Towards Goal  Goal: *LVAD parameters within set limits  Outcome: Progressing Towards Goal     Problem: Nutrition Deficit  Goal: *Optimize nutritional status  Outcome: Progressing Towards Goal

## 2023-03-22 NOTE — DIALYSIS
CRRT / [phone]         Orders   Mode: CVVH restarted @ 0010   Blood Flow Rate: 200 ml/min   Prismasol Dose: PBP: 800 ml/hr  Replacement 1: 400 ml/hr  Replacement 2: 400 ml/hr   Prismasol Concentrate: 4K / 2.5Ca   Blood Warmer Temp: 37*C   Net Fluid Removal: 0 ml/hr          Metrics   BP: 94/52   HR: 88   Access Pressure: -39 (lines reversed)   Filter Pressure: 137   Return Pressure: 50 (lines reversed)   TMP: 32   Pressure Drop: 47          Access   Type & Location: LIJ temporary non-tunneled CVC, dressing C/D/I with bio-patch dated 03/21/23. No signs of redness, drainage, or infection visualized. Each catheter limb disinfected for 60 seconds per limb with alcohol swabs. Caps removed, dialysis CVC hub scrubbed with Prevantics for 15 seconds, followed by a 5 second dry time per Hospital P&P. +asp/+flush x 2 ports. Labs   HBsAg (Antigen) / date: Negative  03/13/23                                      HBsAb (Antibody) / date: Susceptible  03/13/23   Source: [facility]          Safety:   Time Out Done:   (Time) 0005   Consent obtained/signed: Verified   Education: Access/Site Care   Primary Nurse Rpt Pre: BARBARA Contreras RN   Primary Nurse Rpt Post: BARBARA Contreras RN      Comments / Plan:   Araceli Gray RN at bedside to change filter d/t pt filter approaching max life. Patient, code status, labs, and orders verified. Consents on chart. Time out completed. Araceli Gray RN able to return pt blood from circuit (186 mls.) Old set discarded in red biohazard bin. HF-1000 filter set-up, primed w/ 1L NS, tested and running well. Lines visible and connections secure with blood warmer to return line at 37*C. Education, pre and post to primary RN. Auto-effluent cartridge also changed at this time as well d/t max cartridge life.     Lines running reversed on start.    /

## 2023-03-22 NOTE — PROGRESS NOTES
[facility]    ICU TEAM Progress Note    Name: Dominic Welsh   : 1950   MRN: [id number]   Date: 3/22/2023      I  Subjective:   Progress Note: 3/22/2023      Reason for ICU Admission: Cardiomyopathy status post LVAD implantation     Interval history:  71year old male PMH as noted above admitted to [facility] on  due to ongoing symptoms secondary to his HFrEF. Treated for possible CAP, and diuresed for acute on chronic HFrEF. Nephrology following for TANNER on CKD 3. AHF team recommended transfer to CVICU for [facility] placement and ongoing LVAD workup, with placement 2/15. Pt extubated , however with development of worsening renal dysfunction overnight and unable to tolerate CRRT so was reintubated and taken for Impella RP placement for right-sided support in a.m. of  and CRRT resumed. Impella removed on 3/1. Overnight 3/1-3/1 CVA noted with SAH.  3/2 SAH enlarged. Held anticoagulation. Failed extubation 3/2. Trach placed 3/7. Transitioned to [facility]. Repeat CTH 3/10 w/ persistent SAH, heparin held.       Overnight Events:   Continuous SBT since 7am    Active Problem List:     Problem List  Date Reviewed: 2023            Codes Class    Dyslipidemia, goal LDL below 70 ICD-10-CM: E78.5  ICD-9-CM: 272.4         LVAD (left ventricular assist device) present ([facility] ICD-10-CM: Z95.811  ICD-9-CM: V43.21         PAD (peripheral artery disease) ([facility] ICD-10-CM: I73.9  ICD-9-CM: 359.1         Systolic CHF, acute on chronic (HCC) ICD-10-CM: I50.23  ICD-9-CM: 428.23, 428.0         Receiving inotropic medication ICD-10-CM: Z79.899  ICD-9-CM: V49.89         CHF (congestive heart failure) (HCC) ICD-10-CM: I50.9  ICD-9-CM: 428.0         CKD stage 3 due to type 2 diabetes mellitus ([facility] ICD-10-CM: E11.22, N18.30  ICD-9-CM: 250.40, 585.3         S/P CABG x 3 ICD-10-CM: Z95.1  ICD-9-CM: V45.81     Overview Signed 2018 11:20 AM by SCAR Culp     Coronary Artery Bypass Grafting x 3, LIMA to LAD, RSVG to OM, RSVG to RCA  Right GSV EVH             Coronary artery disease involving native coronary artery of native heart without angina pectoris ICD-10-CM: I25.10  ICD-9-CM: 414.01         Hypertension, essential ICD-10-CM: I10  ICD-9-CM: 401.9         Colon cancer screening ICD-10-CM: Z12.11  ICD-9-CM: V76.51         Nonrheumatic aortic valve stenosis ICD-10-CM: I35.0  ICD-9-CM: 424.1         Bruit of right carotid artery ICD-10-CM: R09.89  ICD-9-CM: 785. 9         Type 2 diabetes mellitus with hyperglycemia (HCC) ICD-10-CM: E11.65  ICD-9-CM: 250.00         Deafness ICD-10-CM: H91.90  ICD-9-CM: 389.9         Cramp of limb ICD-10-CM: R25.2  ICD-9-CM: 729.82            Past Medical History:      has a past medical history of CAD (coronary artery disease) (11/10/2016), Deafness (10/28/2012), DM (diabetes mellitus) ([facility] Elevated cholesterol, Hypertension, and NSTEMI (non-ST elevated myocardial infarction) [facility] (11/10/2016). Past Surgical History:      has a past surgical history that includes hx appendectomy; pr unlisted procedure cardiac surgery (11/11/2016); colonoscopy (N/A, 6/28/2018); and colonoscopy (N/A, 1/18/2023). Home Medications:     Prior to Admission medications    Medication Sig Start Date End Date Taking? Authorizing Provider   polyethylene glycol (Miralax) 17 gram/dose powder Take 17 g by mouth daily as needed for Constipation. Yes Provider, Historical   furosemide (LASIX) 20 mg tablet Take 1 Tablet by mouth daily as needed (for weight greater than 120 lb by 2-3 lb or shortness of breath). 1/23/23  Yes Zay Linton NP   glipiZIDE (GLUCOTROL) 5 mg tablet Take 1 tablet by mouth twice daily 12/2/22  Yes Sally Rodriguez MD   ipratropium (ATROVENT) 21 mcg (0.03 %) nasal spray 2 Sprays by Both Nostrils route every twelve (12) hours. 11/21/22  Yes Sally Rodriguez MD   ergocalciferol (ERGOCALCIFEROL) 1,250 mcg (50,000 unit) capsule Take 1 Capsule by mouth every seven (7) days.   Patient taking differently: Take 50,000 Units by mouth every seven (7) days. Mondays 10/14/22  Yes Hollie Morrow MD   Januvia 50 mg tablet Take 1 tablet by mouth once daily 22  Yes Hollie Morrow MD   rosuvastatin (CRESTOR) 20 mg tablet Take 1 Tablet by mouth nightly. 22  Yes Siena Street MD   aspirin delayed-release 81 mg tablet Take 81 mg by mouth daily. Yes Provider, Historical   zolpidem (AMBIEN) 5 mg tablet Take 1 Tablet by mouth nightly as needed for Sleep. Max Daily Amount: 5 mg. 23   Hollie Morrow MD   nitroglycerin (NITROSTAT) 0.4 mg SL tablet 1 Tablet by SubLINGual route every five (5) minutes as needed for Chest Pain. Up to 3 doses. 22   Sagrario Restrepo MD       Allergies/Social/Family History: Allergies   Allergen Reactions    Ambien [Zolpidem] Other (comments)     Causes extreme confusion      Social History     Tobacco Use    Smoking status: Never     Passive exposure: Never    Smokeless tobacco: Never   Substance Use Topics    Alcohol use:  Yes     Alcohol/week: 2.0 standard drinks     Types: 1 Cans of beer, 1 Shots of liquor per week     Comment: rarely      Family History   Problem Relation Age of Onset    Heart Disease Father     Heart Attack Father     Hypertension Mother     Elevated Lipids Brother     Elevated Lipids Brother     No Known Problems Sister     Elevated Lipids Brother     No Known Problems Son     No Known Problems Daughter     Anesth Problems Neg Hx        Review of Systems:   Not able to obtain due to patient medical condition    Objective:   Vital Signs:  Visit Vitals  BP (!) 94/52   Pulse 81   Temp 98.5 °F (36.9 °C)   Resp 25   Ht 5' 6\" (1.676 m)   Wt 61.5 kg (135 lb 9.3 oz)   SpO2 100%   BMI 21.88 kg/m²    O2 Flow Rate (L/min): 20 l/min O2 Device: Tracheostomy, Ventilator Temp (24hrs), Av.6 °F (37 °C), Min:98.3 °F (36.8 °C), Max:98.8 °F (37.1 °C)    CVP (mmHg): 7 mmHg (23 1400)      Intake/Output:     Intake/Output Summary (Last 24 hours) at 3/22/2023 1434  Last data filed at 3/22/2023 1400  Gross per 24 hour   Intake 2392.5 ml   Output 2354.6 ml   Net 37.9 ml       Physical Exam:    General:  Awake, sleepy at times, chronically ill looking on mechanical ventilation and CRRT   Eyes:  Sclera anicteric. Pupils equally round and reactive to light. Mouth/Throat: Mucous membranes normal, oral pharynx clear   Neck: Line in place bilaterally   Lungs:   Coarse crepitation bilaterally   CV:  LVAD hum   Abdomen:   Soft, non-tender. bowel sounds normal. non-distended   Extremities: No cyanosis or edema   Neuro: Open eyes spontaneously, wave and smile, at times follow simple command mainly on the right side, did not appreciate movement on left arm, minimal movement on left leg       LABS AND  DATA: Personally reviewed  Recent Labs     03/22/23 0520 03/21/23 0224   WBC 9.6 9.4   HGB 7.9* 8.0*   HCT 25.9* 26.1*    201     Recent Labs     03/22/23 0520 03/21/23 0224   * 136   K 4.5 4.5    106   CO2 25 25   BUN 31* 26*   CREA 1.45* 1.50*   * 120*   CA 10.3* 9.7   MG 3.2* 3.1*   PHOS 3.5 2.7     Recent Labs     03/22/23 0520 03/21/23 0224   * 379*   TP 6.6 6.3*   ALB 3.4* 3.4*   GLOB 3.2 2.9   LPSE >3,000* >3,000*     Recent Labs     03/22/23 0520 03/21/23 0224   INR 1.4* 1.4*   PTP 14.0* 14.0*      No results for input(s): PHI, PCO2I, PO2I, FIO2I in the last 72 hours. No results for input(s): CPK, CKMB, TROIQ, BNPP in the last 72 hours.     Hemodynamics:   PAP: PAP Systolic: 44 (60/55/30 2695) CO: CO (l/min): 4.8 l/min (03/10/23 0900)   Wedge: PAOP (PCWP) (mmhg): 36 mmHg (02/03/23 1830) CI: CI (l/min/m2): 2.7 l/min/m2 (03/10/23 0900)   CVP:  CVP (mmHg): 7 mmHg (03/22/23 1400) SVR:       PVR:       Ventilator Settings:  Mode Rate Tidal Volume Pressure FiO2 PEEP   Pressure support   350 ml  15 cm H2O 40 % 5 cm H20     Peak airway pressure: 20 cm H2O    Minute ventilation: 8.27 l/min        MEDS: Reviewed    Chest X-Ray:  CXR Results  (Last 48 hours)                 03/21/23 0436  XR CHEST PORT Final result    Impression:      Decreased small pleural effusions and bibasilar opacities. Narrative:  EXAM:  XR CHEST PORT       INDICATION: LVAD       COMPARISON: 3/19/2023 at 0434 hours       TECHNIQUE: Portable AP semiupright chest view at 0428 hours       FINDINGS: The tracheostomy tube, enteric tube, bilateral IJ catheters, and LVAD   are stable. The cardiomediastinal contours are stable. Small pleural effusions and bibasilar opacities are decreased. There is no   pneumothorax. The bones and upper abdomen are stable. Assessment and Plan:   -Cardiomyopathy status post HeartMate 3 LVAD as destination therapy on April 15, 2023:  -Acute kidney injury on chronic kidney disease requiring CRRT:  - Hepatic congestion with elevated bilirubin:  - Right SUPA ischemic stroke with left hemiparesis  -Acute on chronic hypoxic respiratory failure, status post tracheostomy:  - Pancreatitis:  - Critical illness myopathy:     NEUROLOGICAL: Acute encephalopathy, critical illness polyneuropathy   No acute changes in his neurological finding, left hemiparesis. On aspirin. Seems that his alertness is improving slowly  PULMONOLOGY: Acute proximal respiratory failure, status post tracheostomy 7 March 2023   -Protective ventilatory strategy  -Daily SBT  -Pulmonary hygiene     CARDIOVASCULAR: Ischemic and nonischemic cardiomyopathy, left ventricular ejection fraction 25 to 30%, status post LVAD 15 February 2023, status post Impella right side removed 1 March 2023, cardiorenal syndrome   -Appreciate heart failure and cardiothoracic surgery input and management.  Family meeting Friday  -Continue aspirin  -Off pressors     GASTROINTESTINAL: Hepatic congestion, hyperbilirubinemia, possible cirrhosis, pancreatitis   -Continue enteral feeds  -Continue GI prophylaxis  -Total bilirubin continue to trend up, lipase remains elevated     RENAL/ELECTROLYTE/FLUIDS: Acute on chronic renal failure   -Continue hemodialysis with continuous renal replacement therapy, 0 factor  -Strict I's and O's  -Place electrolytes as indicated  -Nephrology following, I appreciate the recommendation and management     ENDOCRINE:   -Maintain blood glucose levels between 140-180   -Sliding scale insulin   HEMATOLOGY/ONCOLOGY: Acute on chronic anemia, gastric neuroendocrine tumor   Transfuse for hemoglobin below 7  Off heparin or other anticoagulation at this time  ID/MICRO:   Patient on meropenem at least since the 16th. Received multiple antibiotic regimen before that. May be able to de-escalate soon, will discuss with heart failure team    DISPOSITION  Stay in ICU    CRITICAL CARE CONSULTANT NOTE  I had a face to face encounter with the patient, reviewed and interpreted patient data including clinical events, labs, images, vital signs, I/O's, and examined patient. I have discussed the case and the plan and management of the patient's care with the consulting services, the bedside nurses and the respiratory therapist.      NOTE OF PERSONAL INVOLVEMENT IN CARE   This patient has a high probability of imminent, clinically significant deterioration, which requires the highest level of preparedness to intervene urgently. I participated in the decision-making and personally managed or directed the management of the following life and organ supporting interventions that required my frequent assessment to treat or prevent imminent deterioration. I personally spent 30 minutes of critical care time. This is time spent at this critically ill patient's bedside actively involved in patient care as well as the coordination of care and discussions with the patient's family. This does not include any procedural time which has been billed separately.     Sanaz Elizabeth DO  Staff Intensivist  [facility]  3/22/2023

## 2023-03-22 NOTE — PROGRESS NOTES
RENAL  PROGRESS NOTE        Subjective:    Seen and examined on CVVH     Objective:   VITALS SIGNS:    Visit Vitals  BP (!) 94/52   Pulse 83   Temp 98.4 °F (36.9 °C)   Resp 21   Ht 5' 6\" (1.676 m)   Wt 61.5 kg (135 lb 9.3 oz)   SpO2 100%   BMI 21.88 kg/m²       O2 Device: Tracheostomy, Ventilator   O2 Flow Rate (L/min): 20 l/min   Temp (24hrs), Av.6 °F (37 °C), Min:98.3 °F (36.8 °C), Max:98.8 °F (37.1 °C)         PHYSICAL EXAM:  Intubated  alert    DATA REVIEW:     INTAKE / OUTPUT:   Last shift:       07 - 1900  In: 326 [I.V.:56]  Out: 321.6 [Drains:200]  Last 3 shifts: 1901 -  07  In: 3294 [I.V.:814]  Out: 3629.5 [Drains:300]    Intake/Output Summary (Last 24 hours) at 3/22/2023 0928  Last data filed at 3/22/2023 0900  Gross per 24 hour   Intake 2442.5 ml   Output 2596.1 ml   Net -153.6 ml           LABS:   Recent Labs     23  0523  0444   WBC 9.6 9.4 7.7   HGB 7.9* 8.0* 8.2*   HCT 25.9* 26.1* 24.6*    201 205       Recent Labs     23  0523  0444   * 136 134*   K 4.5 4.5 4.0    106 105   CO2 25 25 25   * 120* 135*   BUN 31* 26* 28*   CREA 1.45* 1.50* 1.63*   CA 10.3* 9.7 9.7   MG 3.2* 3.1* 2.9*   PHOS 3.5 2.7 2.8   ALB 3.4* 3.4* 3.3*   TBILI 23.2* 24.3* 24.4*   ALT 99* 105* 112*   INR 1.4* 1.4* 1.4*           Assessment:  TANNER on CKD-3a: Suspect cardiorenal effects initially. Now has LVAD and  POST OP ATN. Anuric->Requiring CRRT>>iHD-> back to CRRT 3/15/23. left IJ Hermelindo catheter was last replaced on 3/11/2023 by intensivist     hypercalcemia/immobilization-    Elevated LFTs/Hyperbilirubinemia   pancreatitis- with elevated lipase;    Ischemic CM: Recurrent exacerbations. EF 20%. S/p LVAD on 2/15.  Required Impella RP for RV support  CVA  Sepsis  BPH   Well differentiated NET Grade 1: noted on EGD 23  Anemia 2 to CKD:  s/p PRBCS  Left UE basilic and radial vein thrombus  Thrombocytopenia  Myopathy  Hypermagnesemia        Plan/Recommendations:  Ct CVVH-> 4K Prismasol bags.  Factor rate at 0cc/hr  Weaning Levophed   Will use Biphosphonate if calcium up again   Family meeting this Rosalina Madden MD

## 2023-03-22 NOTE — PROGRESS NOTES
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 2/3/23; the following have been identified and d/w patient as relative concerns pertaining to LVAD candidacy: small body surface area, cachexia, BMI 18, malnutrition, frailty, advanced age, muscular deconditioning, PVD (fingertips), urinary retention requiring donnelly catheter, neuroendocrine tumor class 1 requiring treatment after LVAD, mild neurocognitive dysfunction, chronic kidney disease and hearing loss requiring hearing aid  Family meeting last week with Dr. Caroline Jaffe, Dr. Landen Schwartz, bedside RN, palliative care and SW with plans to observe SBT, persistent liver failure and pancreatitis; if no improvement or worsening over the next week then will discuss timing of withdrawal of support; if catastrophic even between now and next week, family wishes to discontinue LVAD support  Met with wife again 3/21, they are leaning towards LVAD discontinuation    Patient was seen and examined by me. I reviewed the note and data. I have discussed and agree with the plan as noted in the ANP note beneath with the following corrections: none. Time of visit: 25 minutes     Brittanie Blood MD PhD  Jeniffer Carmen [phone]               RECOMMENDATIONS:  Continue LVAD speed at 4700rpm  Use hydralazine 5mg IV q4h prn MAP>85 or SBP > 110 mmHg  No beta-blockers due to hypotension and RV condition prior to LVAD  Cannot tolerate ARB/ARNi due to hypotension and renal function  Cannot tolerate spironolactone due to hyperkalemia and renal function  Cannot tolerate jardiance due to declined renal function  Previously discontinued corlanor due to SVT  Discontinued amio due to intermitted heart blocks under pacemaker  Continue CRRT to keep consistently goal CVP 8-10  Keep K+ >4 and Mg >2  ID recommendations appreciated - pan culture NGTD. Wound culture  No colchicine per nephrology  Antibiotics per intensivist  Continue to hold coumadin  AC on hold for now due to slightly progressive SAH on head CT. Previously D/w intensivist and family, likely more harm from resuming than continuing without. Time to repeat head CT? Hepatology recommendations appreciated   Cannot tolerate statins due to abnormal LFT  Management of tube feeds in light of pancreatitis per intensivist  Probiotic for diarrhea   Daily dressing changes to Drive line exit site  Pulmonary hygiene- continue SBT   VAD education with caregivers/patient when able by VAD coordinator  D/w  bedside staff      INTERVAL HISTORY:  MAPs 70s, HR 80s. Briefly on levophed overnight  CVP 8  I/O  even  -weight same  Hgb steady; INR 1.4; pBNP continues to decrease  TB slight decrease to 23  LFTs similar  Lipase remains > 3000  K 3.8  -Mr. Kendall is awake and waves. Follows some commands.  Less awake than yesterday, but RN states patient followed more commands than usual this morning, but that he may not be fatigued. IMPRESSION:  Acute on chronic combined systolic/diastolic  heart failure  Stage D, NYHA class IV symptoms   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, LVEF 20% (by echo 1/2023) and 23% (by cMRI 1/3/23)  Cardiac MRI suggestive of ischemic cardiomyopathy  PYP equivocal  S/p HeartMate 3  LVAD-DT (2/15/23)  C/b RV failure requiring Impella RP placed 2/18/23 and discontinued 3/1/23  C/b acute on chronic renal failure, requiring CRRT  C/b hepatic failure, TB 12 (peak 15.3)  C/b lactic acidosis, persistent  C/b persistent septic shock c/b vasodilation and high outputs, on multiple antibiotics, procalcitonin persistently elevated  C/b R SUPA occlusion with small area of matched perfusion defect - subacute infarct c/b left sided hemiparesis  C/b pancreatitis  C/b failure to extubate x 2; anticipating tracheostomy this week  Coronary artery disease  CAD s/p CABG x 2: further disease best managed medically due to small vessel size   At risk of sudden cardiac death  Peripheral arterial disease  Bilateral hydronephrosis s/p donnelly  Cardiac risk factors:  HTN  HL  TRISTAN, STOP-BANG 4  DM2  CKD, stage 3  MOCA from 2/9 19/30, consistent with mild cognitive impairment  Hard of hearing  Gastric Neuroendocrine Tumor, elevated gastrin and chromogranin A  Septic shock, improving   Left sided weakness noted night 3/1. +SAH and subacute occlusion distal R cerebral artery   Poorly healing wounds:  sacral deep tissue; left heel deep tissue; driveline site         LIFE GOALS: not re-addressed today   Patient's personal goals included: having a few more years with family  Important upcoming milestones or family events: None at this time   The patient identified the following as important for living well: being at home, not being SOB            CXR (3/5/23) mild pulmonary edema. Small pleural effusions and bibasilar airspace opacities.      Head CTA (3/2/23) Occlusion distal right anterior cerebral artery, with associated small area of matched perfusion defect and cortical enhancement, consistent with subacute infarct. Diminutive and irregular right vertebral artery, occluded at the V3-4 junction, age indeterminate extensive multifocal atherosclerosis in the intracranial and extracranial circulation. CARDIAC IMAGING:   Echo (3/13/23)    Left Ventricle: Severely reduced left ventricular systolic function with a visually estimated EF of 25 - 30%. Left ventricle is dilated. Normal wall thickness. Unable to assess wall motion. Right Ventricle: Moderately reduced systolic function. TAPSE is abnormal. TAPSE is 1.1 cm. LVEDD 5.3cm     ECHO- 3/6/23       Left Ventricle: Severely reduced left ventricular systolic function with a visually estimated EF of 15 - 20%. Left ventricle is moderately dilated. LVAD is present. Right Ventricle: Right ventricle is moderately dilated. Mildly reduced systolic function. Echo 2/19/23    Left Ventricle: Severely reduced left ventricular systolic function with a visually estimated EF of 20 - 25%. Not well visualized. Left ventricle size is normal. Increased wall thickness. Unable to assess wall motion. Abnormal diastolic function. LVAD is present. LVAD inflow cannula was not well visualized. Right Ventricle: Not well visualized. Right ventricle is mildly dilated. Moderately reduced systolic function. TAPSE is abnormal.    Mitral Valve: Severe annular calcification at the anterior and posterior leaflets of the mitral valve. Mild regurgitation. Left Atrium: Left atrium is dilated. Right Atrium: Right atrium is dilated. Technical qualifiers: Echo study was technically difficult and technically difficult with poor endocardial visualization. Echo 1/27/23    Left Ventricle: EF by visual approximation is 20%. Left ventricle is mildly dilated. Mitral Valve: Moderately thickened leaflet, at the anterior and posterior leaflets. Moderately calcified leaflet.  Moderate regurgitation. Left Atrium: Left atrium is moderately dilated. Technical qualifiers: Echo study was technically difficult with poor endocardial visualization. Contrast used: Definity. Limited study, not all structures viewed     Echo 1/9/23   Left Ventricle: Severely reduced left ventricular systolic function with a visually estimated EF of 25 - 30%. Left ventricle size is normal. Mildly increased wall thickness. Echo 12/26/22    Left Ventricle: Severely reduced left ventricular systolic function with a visually estimated EF of 25 - 30%. Left ventricle size is normal. Normal wall thickness. There are regional wall motion abnormalities. Grade II diastolic dysfunction with increased LAP. Right Ventricle: Moderately reduced systolic function. TAPSE is abnormal. TAPSE is 1.1 cm. Aortic Valve: Mild stenosis of the aortic valve. AV peak gradient is 13 mmHg. AV peak velocity is 1.8 m/s. Mitral Valve: Not well visualized. Moderate annular calcification at the posterior leaflet of the mitral valve. Mild to moderate regurgitation. Tricuspid Valve: Mildly elevated RVSP. Left Atrium: Left atrium is moderately dilated. 12/8/22    Left Ventricle: Moderately reduced left ventricular systolic function with a visually estimated EF of 35 - 40%. Severe hypokinesis of the following segments: mid anteroseptal, apical anterior, apical septal, apical inferior and apical lateral. Severe hypokinesis of the apex. Mitral Valve: Severely thickened leaflet, at the anterior and posterior leaflets. Severely calcified leaflet, at the anterior and posterior leaflets. Mild annular calcification of the mitral valve. Moderate regurgitation. Left Atrium: Left atrium is mildly dilated. Contrast used: Definity. limited study     EKG 12/22/22 ST, Biatria enlargement, marked ST abnormality     LHC 12/6/22  1. Normal LVEDP  2. Severe native multivessel coronary artery disease  3.   Patent LIMA to LAD and vein graft to distal RCA  4. Recurrent ISR in OM1 stent with now 60 to 70% restenosis  5. Recoil of left main and circumflex stent with now recurrent 40 to 50% stenosis. 6.  Progression of ostial left main disease now to about 60% stenosis  7. Progression of disease in jailed first marginal branch now with diffuse 90% stenosis  8. High-grade stenosis in the mid to distal right potential femoral artery treated with 6 x 40 mm impact drug-coated balloon angioplasty to reduce the stenosis to less than 40%        HEMODYNAMICS:  RHC 1/9/23  PA 20/9, RA 3, PCWP 8, CI 1.8     CPEST too ill   6MW 300 feet     LVAD INTERROGATION:  Device interrogated in person  Device function normal, normal flow, no events  LVAD   Pump Speed (RPM): 4700  Pump Flow (LPM): 3.6  MAP: 72  PI (Pulsitility Index): 3.9  Power: 3   Test: Yes  Back Up  at Bedside & Labeled: Yes  Power Module Test: Yes  Driveline Site Care: No  Driveline Dressing: Clean, Dry, and Intact  Testing  Alarms Reviewed: Yes  Back up SC speed: 4700  Back up Low Speed Limit: 4300  Emergency Equipment with Patient?: Yes  Emergency procedures reviewed?: Yes  Drive line site inspected?: No  Drive line intergrity inspected?: Yes  Drive line dressing changed?: No    PHYSICAL EXAM:  Visit Vitals  BP (!) 94/52   Pulse 84   Temp 98.4 °F (36.9 °C)   Resp 21   Ht 5' 6\" (1.676 m)   Wt 135 lb 9.3 oz (61.5 kg)   SpO2 100%   BMI 21.88 kg/m²     REVIEW OF SYSTEMS:  Review of Systems   Unable to perform ROS: Acuity of condition.   Able to nod some      Physical Assessment:   General Appearance: awake, ill appearing adult male resting in bed in NAD; appears stated age  Eyes: sclera anicteric  Mouth/Throat: moist mucous membranes; oral pharynx clear  Neck: supple; trach  Pulmonary:  clear to auscultation bilaterally; trach/vent  Cardiovascular: regular rate and rhythm; VAD hum  Abdomen: soft, non-tender, distended; hyper active bowel sounds normal  Musculoskeletal: no swelling or deformity; weak on L  Extremities: no edema edema; non palpable distal pulses   Skin: warm and dry  Neuro: opens eyes; follows some commands;  limited movement on left side   Psych: unable to assess         PAST MEDICAL HISTORY:  Past Medical History:   Diagnosis Date    CAD (coronary artery disease) 11/10/2016    NSTEMI & 2 stents    Deafness 10/28/2012    DM (diabetes mellitus) ([facility]     Elevated cholesterol     Hypertension     NSTEMI (non-ST elevated myocardial infarction) ([facility] 11/10/2016       PAST SURGICAL HISTORY:  Past Surgical History:   Procedure Laterality Date    COLONOSCOPY N/A 6/28/2018    COLONOSCOPY performed by Marleny Giron MD at [facility] ENDOSCOPY    COLONOSCOPY N/A 1/18/2023    COLONOSCOPY performed by Suresh Thomson MD at [address] [place] [address]  11/11/2016    2 stents       FAMILY HISTORY:  Family History   Problem Relation Age of Onset    Heart Disease Father     Heart Attack Father     Hypertension Mother     Elevated Lipids Brother     Elevated Lipids Brother     No Known Problems Sister     Elevated Lipids Brother     No Known Problems Son     No Known Problems Daughter     Anesth Problems Neg Hx        SOCIAL HISTORY:  Social History     Socioeconomic History    Marital status:    Tobacco Use    Smoking status: Never     Passive exposure: Never    Smokeless tobacco: Never   Vaping Use    Vaping Use: Never used   Substance and Sexual Activity    Alcohol use:  Yes     Alcohol/week: 2.0 standard drinks     Types: 1 Cans of beer, 1 Shots of liquor per week     Comment: rarely    Drug use: No    Sexual activity: Yes     Social Determinants of Health     Financial Resource Strain: Medium Risk    Difficulty of Paying Living Expenses: Somewhat hard   Food Insecurity: Food Insecurity Present    Worried About Running Out of Food in the Last Year: Never true    Ran Out of Food in the Last Year: Often true LABORATORY RESULTS:     Labs Latest Ref Rng & Units 3/22/2023 3/21/2023 3/20/2023 3/19/2023 3/18/2023 3/18/2023 3/17/2023   WBC 4.1 - 11.1 K/uL 9.6 9.4 7.7 6.6 - 7.0 7.7   RBC 4.10 - 5.70 M/uL 2.55(L) 2.59(L) 2.54(L) 2.14(L) - 2.32(L) 2.39(L)   Hemoglobin 12.1 - 17.0 g/dL 7. 9(L) 8.0(L) 8.2(L) 7. 0(L) - 7. 6(L) 7. 9(L)   Hematocrit 36.6 - 50.3 % 25. 9(L) 26. 1(L) 24. 6(L) 21. 9(L) - 23. 2(L) 23. 9(L)   MCV 80.0 - 99.0 . 6(H) 100. 8(H) 96.9 102. 3(H) - 100. 0(H) 100. 0(H)   Platelets 642 - 507 K/uL 177 201 205 222 - 244 252   Lymphocytes 12 - 49 % 9(L) 9(L) 9(L) 10(L) - 11(L) 10(L)   Monocytes 5 - 13 % 10 10 8 14(H) - 15(H) 15(H)   Eosinophils 0 - 7 % 3 3 4 3 - 4 4   Basophils 0 - 1 % 0 1 0 1 - 1 1   Albumin 3.5 - 5.0 g/dL 3.4(L) 3.4(L) 3. 3(L) 3.4(L) 3.5 3. 3(L) 3.5   Calcium 8.5 - 10.1 MG/DL 10. 3(H) 9.7 9.7 10.0 10. 5(H) 10. 9(H) 10. 9(H)   Glucose 65 - 100 mg/dL 130(H) 120(H) 135(H) 140(H) 145(H) 81 83   BUN 6 - 20 MG/DL 31(H) 26(H) 28(H) 26(H) 29(H) 28(H) 40(H)   Creatinine 0.70 - 1.30 MG/DL 1.45(H) 1.50(H) 1.63(H) 1.74(H) 1.82(H) 2.06(H) 2.23(H)   Sodium 136 - 145 mmol/L 135(L) 136 134(L) 138 136 137 134(L)   Potassium 3.5 - 5.1 mmol/L 4.5 4.5 4.0 4.3 4.4 4.3 4.2   TSH 0.36 - 3.74 uIU/mL - - - - - - 1.25   LDH 85 - 241 U/L 264(H) 263(H) 274(H) 240 - 247(H) 281(H)   Some recent data might be hidden     Lab Results   Component Value Date/Time    TSH 1.25 03/17/2023 02:10 AM    TSH 3.52 01/28/2023 05:26 AM    TSH 2.12 12/27/2022 02:36 PM    TSH 4.80 (H) 12/06/2022 03:53 AM    TSH 5.39 (H) 10/12/2022 09:10 AM    TSH 3.53 02/03/2022 11:47 AM    TSH 5.790 (H) 11/21/2019 04:45 PM    TSH 3.08 06/22/2018 01:53 PM    TSH 4.250 05/26/2015 09:43 AM       ALLERGY:  Allergies   Allergen Reactions    Ambien [Zolpidem] Other (comments)     Causes extreme confusion        CURRENT MEDICATIONS:    Current Facility-Administered Medications:     L.acidophilus-paracasei-S.thermophil-bifidobacter (RISAQUAD) 8 billion cell capsule, 1 Capsule, Nasogastric, DAILY, Colleen Astorga, NP, 1 Capsule at 03/22/23 0832    insulin lispro (HUMALOG) injection, , SubCUTAneous, Q12H, Justin Wall MD    0.9% sodium chloride infusion 250 mL, 250 mL, IntraVENous, PRN, Teagan Coffman MD    bicarbonate dialysis (PRISMASOL) BG K 4/Ca 2.5 5000 ml solution, , Extracorporeal, DIALYSIS CONTINUOUS, Jadiel Galvan MD, Last Rate: 1,600 mL/hr at 03/22/23 1132, New Bag at 03/22/23 1132    levETIRAcetam (KEPPRA) injection 500 mg, 500 mg, IntraVENous, Q12H, Rubina Fishman MD, 500 mg at 03/22/23 7073    [COMPLETED] meropenem (MERREM) 1 g in 0.9% sodium chloride 20 mL IV syringe, 1 g, IntraVENous, NOW, 1 g at 03/15/23 1734 **FOLLOWED BY** meropenem (MERREM) 1 g in 0.9% sodium chloride (MBP/ADV) 50 mL MBP, 1 g, IntraVENous, Q12H, Rubina Fishman MD, Last Rate: 16.7 mL/hr at 03/22/23 0504, 1 g at 03/22/23 0504    [Held by provider] insulin NPH (NOVOLIN N, HUMULIN N) injection 10 Units, 10 Units, SubCUTAneous, Q12H, Cathy Sheldon CNS, 10 Units at 03/15/23 2112    albumin human 25% (BUMINATE) solution 12.5 g, 12.5 g, IntraVENous, Q12H, Lizette Linton, NP, 12.5 g at 03/22/23 9524    oxyCODONE IR (ROXICODONE) tablet 5 mg, 5 mg, Oral, Q4H PRN, Shersaud Contreras G, DO, 5 mg at 03/22/23 0504    oxyCODONE IR (ROXICODONE) tablet 10 mg, 10 mg, Oral, Q4H PRN, Natalie Ko, DO, 10 mg at 03/21/23 1813    pantoprazole (PROTONIX) 40 mg in 0.9% sodium chloride 10 mL injection, 40 mg, IntraVENous, DAILY, Walker Pleitez, MD, 40 mg at 03/22/23 0831    labetaloL (NORMODYNE;TRANDATE) injection 5 mg, 5 mg, IntraVENous, Q4H PRN, Allen MOLINA MD, 5 mg at 03/09/23 0307    [Held by provider] heparin 25,000 units in D5W 250 ml infusion, 400 Units/hr, IntraVENous, CONTINUOUS, Lizette Linton NP, Stopped at 03/10/23 1350    0.9% sodium chloride infusion, 11 mL/hr, IntraVENous, CONTINUOUS, Juan Jose Johnson MD, Last Rate: 3 mL/hr at 03/22/23 0619, 3 mL/hr at 03/22/23 4392 albumin human 25% (BUMINATE) solution 25 g, 25 g, IntraVENous, DIALYSIS PRN, Sarthak Hammer MD, 25 g at 03/13/23 0539    hydrALAZINE (APRESOLINE) 20 mg/mL injection 5 mg, 5 mg, IntraVENous, Q6H PRN, Tom Rubin MD, 5 mg at 03/10/23 1316    hydrALAZINE (APRESOLINE) tablet 5 mg, 5 mg, Oral, PRN, Tom Rubin MD    aspirin chewable tablet 81 mg, 81 mg, Per NG tube, DAILY, Kimber Meigs, MD, 81 mg at 03/22/23 8917    epoetin heidy-epbx (RETACRIT) injection 10,000 Units, 10,000 Units, SubCUTAneous, Q TUE, THU & SAT, Abou-Assi, Kiran Mueller MD, 10,000 Units at 03/21/23 2058    EPINEPHrine (ADRENALIN) 10 mg in 0.9% sodium chloride 250 mL infusion, 0-10 mcg/min, IntraVENous, TITRATE, Josh Johnson MD, Stopped at 03/05/23 0920    NOREPINephrine (LEVOPHED) 8 mg in 5% dextrose 250mL (32 mcg/mL) infusion, 0.5-16 mcg/min, IntraVENous, TITRATE, Josh Johnson MD, Stopped at 03/22/23 0115    [Held by provider] colchicine tablet 0.6 mg, 0.6 mg, Oral, DAILY, Shaila, Lizette B, NP, 0.6 mg at 03/14/23 0848    heparin (porcine) 1,000 unit/mL injection 1,700 Units, 1,700 Units, InterCATHeter, DIALYSIS PRN, 1,700 Units at 03/13/23 0819 **AND** heparin (porcine) 1,000 unit/mL injection 1,500 Units, 1,500 Units, InterCATHeter, DIALYSIS PRN, Bennett Avelar DO, 1,500 Units at 03/13/23 0819    balsam peru-castor oiL (VENELEX) ointment, , Topical, BID, aYz Braun MD, Given at 03/22/23 0395    [Held by provider] acetaminophen (TYLENOL) solution 650 mg, 650 mg, Oral, Q4H PRN, Nicole Medina MD, 650 mg at 03/14/23 1924    [Held by provider] acetaminophen (TYLENOL) suppository 650 mg, 650 mg, Rectal, Q4H PRN, Nicole Medina MD, 650 mg at 02/17/23 2013    HYDROmorphone (DILAUDID) injection 0.5 mg, 0.5 mg, IntraVENous, Q3H PRN, Thaddeus MOLINA MD, 0.5 mg at 03/20/23 2051    HYDROmorphone (DILAUDID) injection 1 mg, 1 mg, IntraVENous, Q4H PRN, Madhuri Modi MD, 1 mg at 03/14/23 0848    [Held by provider] heparin 25,000 units in D5W 250 ml infusion, 12-25 Units/kg/hr, IntraVENous, TITRATE, Saray Pickett MD, Stopped at 03/08/23 0515    glucose chewable tablet 16 g, 4 Tablet, Oral, PRN, Shaila, Lizette B, NP    glucagon (GLUCAGEN) injection 1 mg, 1 mg, IntraMUSCular, PRN, Shaila, Lizette B, NP    sodium chloride (NS) flush 5-40 mL, 5-40 mL, IntraVENous, Q8H, Shaila, Lizette B, NP, 10 mL at 03/22/23 0505    sodium chloride (NS) flush 5-40 mL, 5-40 mL, IntraVENous, PRN, Shaila, Lizette B, NP, 40 mL at 02/17/23 1818    naloxone (NARCAN) injection 0.4 mg, 0.4 mg, IntraVENous, PRN, Shaila, Lizette B, NP    ELECTROLYTE REPLACEMENT NOTE: Nurse to review Serum Potassium and Magnesuim levels and Initiate Electrolyte Replacement Protocol as needed, 1 Each, Other, PRN, Shaila, Lizette B, NP    dextrose 10 % infusion 0-250 mL, 0-250 mL, IntraVENous, PRN, Shaila, Lizette B, NP, Last Rate: 150 mL/hr at 02/24/23 0220, 75 mL at 02/24/23 0220    [Held by provider] acetaminophen (TYLENOL) tablet 650 mg, 650 mg, Oral, Q6H PRN, Shaila, Lizette B, NP, 650 mg at 03/13/23 2114    ondansetron (ZOFRAN) injection 4 mg, 4 mg, IntraVENous, Q4H PRN, Shaila, Lizette B, NP, 4 mg at 03/09/23 1212    albuterol-ipratropium (DUO-NEB) 2.5 MG-0.5 MG/3 ML, 3 mL, Nebulization, Q6H PRN, Shaila, Lizette B, NP    bisacodyL (DULCOLAX) suppository 10 mg, 10 mg, Rectal, DAILY PRN, Shaila, Lizette B, NP, 10 mg at 02/22/23 0839    dexmedeTOMidine in 0.9 % NaCl (PRECEDEX) 400 mcg/100 mL (4 mcg/mL) infusion soln, 0.1-1.5 mcg/kg/hr, IntraVENous, TITRATE, Bradley Johnson MD, Stopped at 03/08/23 2300    PATIENT CARE TEAM:  Patient Care Team:  Wyatt Camejo MD as PCP - General (Family Medicine)  Wyatt Camejo MD as PCP - [facility]  Yaquelin Cavanaugh MD (Cardiovascular Disease Physician)  Chris Robertson MD (Gastroenterology)  yRan Otero MD (Cardiothoracic Surgery)  Sophia Saleh MD (Cardiovascular Disease Physician)  Lucie Davenport MD (Nephrology)  Nataly Alaniz MD (Pulmonary Disease)  Maggie Mueller MD ([address] [place] [address] Vascular Surgery)     Thank you for allowing me to participate in this patient's care. Jimmy Berry NP   [address] [place] [address], [address]  Phone: [phone]      On this date 3/22/2023, I have spent a total time of  25 minutes personally reviewing new vitals, test results, notes, telemetry/EKG, face to face encounter/physical exam of patient, writing orders, performing medical decision making, and documenting.

## 2023-03-22 NOTE — PROGRESS NOTES
2000- report received from [facility]. All care assumed. 0040- Maps in 45s. Dr. Harini Pierce at bedside. 250 ml of albumin ordered. 200 mcg Nadege push,     0045- 80 mcg nadege push. Levophed started at 2 maps 80s    0800- Bedside and Verbal shift change report given to [facility] (oncoming nurse) by Elizabeth Toledo (offgoing nurse). Report included the following information SBAR, Intake/Output, MAR, Recent Results, Med Rec Status, Cardiac Rhythm NSR, and Alarm Parameters .

## 2023-03-22 NOTE — PROGRESS NOTES
0800: Bedside and Verbal shift change report given to [facility]phany (oncoming nurse) by Bennett Kelly (offgoing nurse). Report included the following information SBAR, Kardex, OR Summary, Procedure Summary, Intake/Output, MAR, Recent Results, Cardiac Rhythm NSR, and Alarm Parameters . 1000: Lidia Baldwin MD at bedside, updated on plan of care. No new orders received at this time. 1015: PT at bedside working with patient. 1025: Patient placed on SBT, pressure support 15.     1030: Anthony Vaz MD at bedside. Orders received to culture drive-line when dressing change is completed. 1540: RT at bedside, patient placed back on rate on vent. 1715: LVAD dressing change completed, driveline culture sent. 2000: Bedside and Verbal shift change report given to Bennett Kelly (oncoming nurse) by Gagan Reyes RN (offgoing nurse). Report included the following information SBAR, Kardex, OR Summary, Procedure Summary, Intake/Output, MAR, Recent Results, Cardiac Rhythm NSR, and Alarm Parameters .

## 2023-03-23 NOTE — DIALYSIS
CR[facility] / [phone]         Orders   Mode: CVVH restarted 1925   Blood Flow Rate: 200 ml/min   Prismasol Dose: PBP: 800 ml/hr  Replacement 1: 400 ml/hr  Replacement 2: 400 ml/hr   Prismasol Concentrate: 4K / 2.5Ca   Blood Warmer Temp: 37*C   Net Fluid Removal: 0 ml/hr          Metrics   BP: 102/59   HR: 85   Access Pressure: -42 (lines reversed)   Filter Pressure: 145   Return Pressure:  52(lines reversed)   TMP: 37   Pressure Drop: 50          Access   Type & Location: LIJ temporary non-tunneled CVC, dressing C/D/I with bio-patch dated 03/21/23. No signs of redness, drainage, or infection visualized. Each catheter limb disinfected for 60 seconds per limb with alcohol swabs. Caps removed, dialysis CVC hub scrubbed with Prevantics for 15 seconds, followed by a 5 second dry time per Hospital P&P. +asp/+flush x 2 ports. Labs   HBsAg (Antigen) / date: Negative  03/13/23                                      HBsAb (Antibody) / date: Susceptible  03/13/23   Source: [facility]          Safety:   Time Out Done:   (Time) 0005   Consent obtained/signed: Verified   Education: Access/Site Care   Primary Nurse Rpt Pre: BARBARA Contreras RN   Primary Nurse Rpt Post: BARBARA Contreras RN      Comments / Plan:   Virginia Wilson RN at bedside to change filter d/t visible clots in filter and deaeration chamber. Patient, code status, labs, and orders verified. Consents on chart. Time out completed. Primary RN able to return pt blood from circuit (186 mls.) Old set discarded in red biohazard bin. HF-1000 filter set-up, primed w/ 1L NS, tested and running well. Lines visible and connections secure with blood warmer to return line at 37*C. Education, pre and post to primary RN. Auto-effluent cartridge also changed at this time as well d/t max cartridge life.     Lines running reversed on start.    /

## 2023-03-23 NOTE — PROGRESS NOTES
Problem: Self Care Deficits Care Plan (Adult)  Goal: *Acute Goals and Plan of Care (Insert Text)  Description:   FUNCTIONAL STATUS PRIOR TO ADMISSION: Patient required minimal assistance for basic and instrumental ADLs. Used rollator for functional mobility, had HHA and HH OT/PT upon discharge. HOME SUPPORT: The patient lived with wife and family members. Occupational Therapy Goals  Initiated 1/30/2023; Goals remain the same, LVAD scheduled for 2/15  1. Patient will perform grooming with supervision/set-up within 7 day(s). 2.  Patient will perform upper body dressing with supervision/set-up within 7 day(s). 3.  Patient will perform lower body dressing with supervision/set-up within 7 day(s). 4.  Patient will perform all aspects of toileting with supervision/set-up within 7 day(s). 5.  Patient will utilize energy conservation techniques during functional activities with verbal cues within 7 day(s). Occupational Therapy Goals  Initiated 3/13/2023  1. Patient will perform ADLs standing 5 mins without fatigue or LOB with maximal assistance  within 7 day(s). 2.  Patient will perform lower body ADLs with maximal assistance  within 7 day(s). 3.  Patient will perform upper body ADLs with maximal assistance within 7 day(s). 4.  Patient will perform toilet transfers with maximal assistance within 7 day(s). 5.  Patient will perform all aspects of toileting with maximal assistance within 7 day(s). 6.  Patient will participate in cardiac/sternal upper extremity therapeutic exercise/activities to increase independence with ADLs with maximal assistance for 5 minutes within 7 day(s). 7.  Patient will perform VAD switchover routine as part of ADL routine (including batteries<>batteries, battery clip<>battery clip, mock SC<>SC) with maximal assistance within 7 days. 8. Patient will participate in [address] [employer] and/or [facility] when appropriate within 7 days. Goals updated 3/20/2023  1. Patient will perform grooming in chair position supine in bed with maximal assistance  within 7 day(s). 2.  Patient will attend to L/R visual fields consistently in prep for ADLs with maximal cueing within 7 day(s). 3.  Patient will perform upper body ADLs with maximal assistance via sophia dressing techniques within 7 day(s). 4.  Patient will perform toilet transfers with maximal assistance within 7 day(s). 5.  Patient will perform all aspects of toileting with maximal assistance within 7 day(s). 6.  Patient will participate in cardiac/sternal upper extremity therapeutic exercise/activities to increase independence with ADLs with maximal assistance for 5 minutes within 7 day(s). 7.  Patient will perform VAD switchover routine as part of ADL routine (including batteries<>batteries, battery clip<>battery clip, mock SC<>SC) with maximal assistance within 7 days. 8. Patient will participate in [address] [place] [address] [employer] and/or [address] [place] [address] when appropriate within 7 days. Outcome: Progressing Towards Goal   OCCUPATIONAL THERAPY TREATMENT  Patient: Harsha Cedeno (76 y.o. male)  Date: 3/23/2023  Diagnosis: CHF (congestive heart failure) ([facility]) [I50.9] <principal problem not specified>  Procedure(s) (LRB):  LEFT GROIN RP IMPELLA INSERTION, KIARRA BY DR Aleksandr Franklin (Left) 33 Days Post-Op  Precautions: Fall (mindful movement)  Chart, occupational therapy assessment, plan of care, and goals were reviewed. ASSESSMENT  Patient continues with skilled OT services and is not progressing towards goals. Patient limited by lethargy throughout session on this date, continues to require CRRT and vent supports. Patient able to open eyes intermittently with painful stimuli however returning to sleep frequently. Minimal command following observed. Attempted face washing on this date however limited by hypertonicity and clonus in RUE when attempting to reach face.  Patient with significantly increased tone in RLE, unable to achieve knee flexion/hip flexion due to persistent extension. No volitional activation noted in LUE/LLE; however per chart patient \"waving with L hand\" later on this date. Repositioned and remained in supine at conclusion of session following gentle cervical stretching. Per chart plans for family meeting today. Current Level of Function Impacting Discharge (ADLs): total A     Other factors to consider for discharge: CRRT, LVAD HM III, Trach/Vent         PLAN :  Patient continues to benefit from skilled intervention to address the above impairments. Continue treatment per established plan of care to address goals. Recommendation for discharge: (in order for the patient to meet his/her long term goals)  To be determined: likely will need rehab pending progress    This discharge recommendation:  Has been made in collaboration with the attending provider and/or case management    IF patient discharges home will need the following DME: TBD       SUBJECTIVE:   Patient did not verbalize during session. OBJECTIVE DATA SUMMARY:   Cognitive/Behavioral Status:  Neurologic State: Drowsy  Orientation Level: Unable to verbalize  Cognition: Decreased command following             Functional Mobility and Transfers for ADLs:  Bed Mobility:   Total A       ADL Intervention:       Grooming  Grooming Assistance: Total assistance(dependent)  Washing Face: Total assistance (dependent)       Pain:  Pt with pain behaviors intermittently as well as increased lethargy    Activity Tolerance:   Poor    After treatment patient left in no apparent distress:   Supine in bed, Call bell within reach, and Side rails x 3    COMMUNICATION/COLLABORATION:   The patients plan of care was discussed with: Physical therapist, Occupational therapist, and Registered nurse.      Naty Dumont OT  Time Calculation: 13 mins

## 2023-03-23 NOTE — PROGRESS NOTES
RENAL  PROGRESS NOTE        Subjective:    Seen and examined on CVVH   No changes  Objective:   VITALS SIGNS:    Visit Vitals  /60   Pulse 91   Temp 98.1 °F (36.7 °C)   Resp 18   Ht 5' 6\" (1.676 m)   Wt 61.3 kg (135 lb 2.3 oz)   SpO2 100%   BMI 21.81 kg/m²       O2 Device: Tracheostomy   O2 Flow Rate (L/min): 20 l/min   Temp (24hrs), Av.3 °F (36.8 °C), Min:98.1 °F (36.7 °C), Max:98.6 °F (37 °C)         PHYSICAL EXAM:  Intubated       DATA REVIEW:     INTAKE / OUTPUT:   Last shift:       07 - 1900  In: 180 [I.V.:100]  Out: 10.2   Last 3 shifts: 1901 -  0700  In: 2797.6 [I.V.:597.6]  Out: 2927.8 [Drains:300]    Intake/Output Summary (Last 24 hours) at 3/23/2023 0953  Last data filed at 3/23/2023 0867  Gross per 24 hour   Intake 1461.8 ml   Output 1409.7 ml   Net 52.1 ml           LABS:   Recent Labs     23  0435 23  0523   WBC 10.1 9.6 9.4   HGB 8.2* 7.9* 8.0*   HCT 26.0* 25.9* 26.1*    177 201       Recent Labs     23  0435 23  0523   * 135* 136   K 4.8 4.5 4.5    104 106   CO2 27 25 25   * 130* 120*   BUN 30* 31* 26*   CREA 1.40* 1.45* 1.50*   CA 10.9* 10.3* 9.7   MG 3.2* 3.2* 3.1*   PHOS 3.5 3.5 2.7   ALB 3.4* 3.4* 3.4*   TBILI 23.0* 23.2* 24.3*   * 99* 105*   INR 1.4* 1.4* 1.4*           Assessment:  TANNER on CKD-3a: Suspect cardiorenal effects initially. Now has LVAD and  POST OP ATN. Anuric->Requiring CRRT>>iHD-> back to CRRT 3/15/23. left IJ Hermelindo catheter was last replaced on 3/11/2023 by intensivist     hypercalcemia/immobilization-    Elevated LFTs/Hyperbilirubinemia   pancreatitis- with elevated lipase;    Ischemic CM: Recurrent exacerbations. EF 20%. S/p LVAD on 2/15.  Required Impella RP for RV support  CVA  Sepsis  BPH   Well differentiated NET Grade 1: noted on EGD 23  Anemia 2 to CKD:  s/p PRBCS  Left UE basilic and radial vein thrombus  Thrombocytopenia  Myopathy  Hypermagnesemia        Plan/Recommendations:  Ct CVVH-> 4K Prismasol bags.  Factor rate at 0cc/hr  Weaning Levophed   Will use Biphosphonate  if family wants to continue current care   Family meeting this Toby Rhodes MD

## 2023-03-23 NOTE — DIALYSIS
CR[facility] / [phone]         Orders   Mode: CVVH rounded @ 0115   Blood Flow Rate: 200 ml/min   Prismasol Dose: PBP: 800 ml/hr  Replacement 1: 400 ml/hr  Replacement 2: 400 ml/hr   Prismasol Concentrate: 4K / 2.5Ca   Blood Warmer Temp: 37*C   Net Fluid Removal: 0 ml/hr          Metrics   BP: 108/60   HR: 90   Access Pressure:  -43 (lines reversed)   Filter Pressure: 145   Return Pressure:  49 (lines reversed)   TMP: 85   Pressure Drop: 55          Access   Type & Location: LIJ temporary non-tunneled CVC, dressing C/D/I with bio-patch dated 03/21/23. No signs of redness, drainage, or infection visualized. Labs   HBsAg (Antigen) / date: Negative  03/13/23                                      HBsAb (Antibody) / date: Susceptible  03/13/23   Source: [facility]          Safety:   Time Out Done:   (Time) 0110   Consent obtained/signed: Verified   Education: Access/Site Care   Primary Nurse Rpt Pre: REGGIE Eagle RN   Primary Nurse Rpt Post: REGGIE Rhodes RN      Comments / Plan:   HF 1000 filter running well at this time with no indication for a filter change. Patient, code status, labs, and orders verified. Consents on chart. Time out completed. Lines visible and connections secure with blood warmer to return line at 37*C. Education, pre and post to primary RN.      Lines running reversed    /

## 2023-03-23 NOTE — PROGRESS NOTES
Problem: Diabetes Self-Management  Goal: *Disease process and treatment process  Description: Define diabetes and identify own type of diabetes; list 3 options for treating diabetes. Outcome: Progressing Towards Goal  Goal: *Incorporating nutritional management into lifestyle  Description: Describe effect of type, amount and timing of food on blood glucose; list 3 methods for planning meals. Outcome: Progressing Towards Goal  Goal: *Incorporating physical activity into lifestyle  Description: State effect of exercise on blood glucose levels. Outcome: Progressing Towards Goal  Goal: *Developing strategies to promote health/change behavior  Description: Define the ABC's of diabetes; identify appropriate screenings, schedule and personal plan for screenings. Outcome: Progressing Towards Goal  Goal: *Using medications safely  Description: State effect of diabetes medications on diabetes; name diabetes medication taking, action and side effects. Outcome: Progressing Towards Goal  Goal: *Monitoring blood glucose, interpreting and using results  Description: Identify recommended blood glucose targets  and personal targets. Outcome: Progressing Towards Goal  Goal: *Prevention, detection, treatment of acute complications  Description: List symptoms of hyper- and hypoglycemia; describe how to treat low blood sugar and actions for lowering  high blood glucose level. Outcome: Progressing Towards Goal  Goal: *Prevention, detection and treatment of chronic complications  Description: Define the natural course of diabetes and describe the relationship of blood glucose levels to long term complications of diabetes.   Outcome: Progressing Towards Goal  Goal: *Developing strategies to address psychosocial issues  Description: Describe feelings about living with diabetes; identify support needed and support network  Outcome: Progressing Towards Goal  Goal: *Insulin pump training  Outcome: Progressing Towards Goal  Goal: *Sick day guidelines  Outcome: Progressing Towards Goal  Goal: *Patient Specific Goal (EDIT GOAL, INSERT TEXT)  Outcome: Progressing Towards Goal     Problem: Patient Education: Go to Patient Education Activity  Goal: Patient/Family Education  Outcome: Progressing Towards Goal     Problem: Pressure Injury - Risk of  Goal: *Prevention of pressure injury  Description: Document Mike Scale and appropriate interventions in the flowsheet. Outcome: Progressing Towards Goal  Note: Pressure Injury Interventions:  Sensory Interventions: Assess changes in LOC, Check visual cues for pain, Float heels, Keep linens dry and wrinkle-free, Minimize linen layers    Moisture Interventions: Absorbent underpads, Check for incontinence Q2 hours and as needed, Limit adult briefs, Minimize layers    Activity Interventions: Pressure redistribution bed/mattress(bed type), PT/OT evaluation    Mobility Interventions: Float heels, Pressure redistribution bed/mattress (bed type), Turn and reposition approx. every two hours(pillow and wedges)    Nutrition Interventions: Document food/fluid/supplement intake    Friction and Shear Interventions: Foam dressings/transparent film/skin sealants, Lift sheet, Minimize layers                Problem: Patient Education: Go to Patient Education Activity  Goal: Patient/Family Education  Outcome: Progressing Towards Goal     Problem: Falls - Risk of  Goal: *Absence of Falls  Description: Document Laverne Fall Risk and appropriate interventions in the flowsheet.   Outcome: Progressing Towards Goal  Note: Fall Risk Interventions:  Mobility Interventions: PT Consult for mobility concerns, PT Consult for assist device competence    Mentation Interventions: Door open when patient unattended, More frequent rounding    Medication Interventions: Evaluate medications/consider consulting pharmacy    Elimination Interventions: Call light in reach    History of Falls Interventions: Door open when patient unattended Problem: Patient Education: Go to Patient Education Activity  Goal: Patient/Family Education  Outcome: Progressing Towards Goal     Problem: Pain  Goal: *Control of Pain  Outcome: Progressing Towards Goal  Goal: *PALLIATIVE CARE:  Alleviation of Pain  Outcome: Progressing Towards Goal     Problem: Patient Education: Go to Patient Education Activity  Goal: Patient/Family Education  Outcome: Progressing Towards Goal     Problem: Patient Education: Go to Patient Education Activity  Goal: Patient/Family Education  Outcome: Progressing Towards Goal     Problem: Patient Education: Go to Patient Education Activity  Goal: Patient/Family Education  Outcome: Progressing Towards Goal     Problem: Nutrition Deficit  Goal: *Optimize nutritional status  Outcome: Progressing Towards Goal     Problem: Ventilator Management  Goal: *Adequate oxygenation and ventilation  Outcome: Progressing Towards Goal  Goal: *Patient maintains clear airway/free of aspiration  Outcome: Progressing Towards Goal  Goal: *Absence of infection signs and symptoms  Outcome: Progressing Towards Goal  Goal: *Normal spontaneous ventilation  Outcome: Progressing Towards Goal     Problem: Patient Education: Go to Patient Education Activity  Goal: Patient/Family Education  Outcome: Progressing Towards Goal     Problem: Patient Education: Go to Patient Education Activity  Goal: Patient/Family Education  Outcome: Progressing Towards Goal     Problem: LVAD Post-op Day 1  Goal: Off Pathway (Use only if patient is Off Pathway)  Outcome: Progressing Towards Goal  Goal: Activity/Safety  Outcome: Progressing Towards Goal  Goal: Consults, if ordered  Outcome: Progressing Towards Goal  Goal: Diagnostic Test/Procedures  Outcome: Progressing Towards Goal  Goal: Nutrition/Diet  Outcome: Progressing Towards Goal  Goal: Medications  Outcome: Progressing Towards Goal  Goal: Respiratory  Outcome: Progressing Towards Goal  Goal: Treatments/Interventions/Procedures  Outcome: Progressing Towards Goal  Goal: Psychosocial  Outcome: Progressing Towards Goal  Goal: *Hemodynamically stable without vasoactive medications  Outcome: Progressing Towards Goal  Goal: *Lungs clear or at baseline  Outcome: Progressing Towards Goal  Goal: *Adequate oxygenation  Outcome: Progressing Towards Goal  Goal: *Mechanical ventilation discontinued  Outcome: Progressing Towards Goal  Goal: *Optimal pain control at patient's stated goal  Outcome: Progressing Towards Goal  Goal: *Demonstrates progressive activity  Outcome: Progressing Towards Goal  Goal: *Tolerating diet  Outcome: Progressing Towards Goal  Goal: *Level of consciousness returns to baseline  Outcome: Progressing Towards Goal  Goal: *LVAD parameters within set limits  Outcome: Progressing Towards Goal     Problem: LVAD Post-op Day 2  Goal: Off Pathway (Use only if patient is Off Pathway)  Outcome: Progressing Towards Goal  Goal: Activity/Safety  Outcome: Progressing Towards Goal  Goal: Consults, if ordered  Outcome: Progressing Towards Goal  Goal: Diagnostic Test/Procedures  Outcome: Progressing Towards Goal  Goal: Nutrition/Diet  Outcome: Progressing Towards Goal  Goal: Medications  Outcome: Progressing Towards Goal  Goal: Discharge Planning  Outcome: Progressing Towards Goal  Goal: Respiratory  Outcome: Progressing Towards Goal  Goal: Treatments/Interventions/Procedures  Outcome: Progressing Towards Goal  Goal: Psychosocial  Outcome: Progressing Towards Goal  Goal: *Hemodynamically stable without vasoactive medications  Outcome: Progressing Towards Goal  Goal: *Lungs clear or at baseline  Outcome: Progressing Towards Goal  Goal: *Adequate oxygenation  Outcome: Progressing Towards Goal  Goal: *Optimal pain control at patient's stated goal  Outcome: Progressing Towards Goal  Goal: *Demonstrates progressive activity  Outcome: Progressing Towards Goal  Goal: *Tolerating diet  Outcome: Progressing Towards Goal  Goal: *LVAD parameters within set limits  Outcome: Progressing Towards Goal     Problem: Nutrition Deficit  Goal: *Optimize nutritional status  Outcome: Progressing Towards Goal

## 2023-03-23 NOTE — PROGRESS NOTES
Palliative Medicine Consult  Z[place]er[place]: [phone] ([phone])    Patient Name: Tyron Red  YOB: 1950    Date of Initial Consult: 1/31/2023  Reason for Consult: [address] Discussion  Requesting Provider: Dr. Kyle Castillo  Primary Care Physician: Sally Rodriguez MD     SUMMARY:   Tyron Red is a 71 y.o. who was admitted on 1/26/2023 from home with a diagnosis of Acute on chronic CHF, SVT. Mr. Kaylee Frederick presented to Er w/ cc of elevated heart rate and persistent difficulty urinating since donnelly catheter removal. . Wife also reported since discharge, he has had orthopnea, persistent tachycardia in 120s, low BP and bilateral lower extremity edema. Mr. Kaylee Frederick was discharged 7 days ago from a prolonged and complicated [facility],22/28-8/66/4868,  2/2 decompensated heart failure, TANNER//bilat outlet obstruction resulting in bilat hydronephroses and urinary retention as well as hypoxic respiratory failure 2/2 suspected PNA., Once  stabilzed he was discharged home on IV milrinone and Life Vest as bridge to LVAD. Prior to this he was hospitalized 12/11-12/16/2022 for CHF exacerbation and before that he was hospitalized  12/5-12/8/22 for NSTEMI and cath. PMH: Cardiomyopathy w/ EF 25-30% range, on home IV Milrinone and Life Vest as bridge to LVAD, CHF, CAD, s/p CABG, NSTEMI x2 stents, deafness (+hearing aide, hears best in left ear), PAD, HTN, HLD, DM 2, CKD. Current medical issues leading to Palliative Medicine involvement include: LVAD candidacy work up. PALLIATIVE DIAGNOSES:   Palliative care encounter  End-stage heart failure  Hepatic failure  Renal Failure  CVA  Hypoxia  Pancreatitis  Goals of care discussion         PLAN:   Prior to visit completed chart review for updated information and spoke briefly with patient's nurse Naty RN    I saw Mr. Kendall, initially he was sleeping, but did wake briefly to voice, waved to me with his right hand, was able to give small nod head yes/no some before falling back to sleep. Mr. Regino Wyatt was on SBT (for unknown period of time) but his nurse reported that he was doing well so far. I touched base with Ms. Kendall, who was with a ALESSANDRO and 2 of Mr. Teague brothers, who were visiting from [place]. Spoke about current SBT and that he was a little more awake today, was able to respond more to simple yes/no questions. We did not discuss GOC today, however Mrs. Kendall confirmed family meeting at [phone] tomorrow Friday 3/24. Mrs. Kendall stated that both daughter and son will be at meeting as well. Please contact me via [employer] with any urgent needs questions or concerns. Communicated plan of care with: Palliative IDT, [facility],   Adv.  Heart Failure , RN, unit CM Drake     GOALS OF CARE / TREATMENT PREFERENCES:     GOALS OF CARE:   Patient/Health Care Proxy Stated Goals: Prolong life    TREATMENT PREFERENCES:   Code Status: Full Code    Patient and family's personal goals include: Undecided at this time    Important upcoming milestones or family events: did not address    The patient identifies the following as important for living well:       Advance Care Planning:  [x] [facility] Interdisciplinary Team has updated the ACP Navigator with Health Care Decision Maker and Patient Capacity      Primary Decision Maker: Susanna Kendall - Spouse - [phone]    Secondary Decision Maker: Omid Clifton - Daughter - [phone]    Secondary Decision Maker: Melo Allred - [phone]  Advance Care Planning 2/9/2023   Patient's Healthcare Decision Maker is: Named in scanned ACP document   Primary Decision Maker Name -   Primary Decision [address] Phone Number -   Primary Decision Maker Relationship to Patient -   Confirm Advance Directive Yes, on file   Patient Would Like to Complete Advance Directive Yes   Does the patient have other document types -       Medical Interventions: Limited additional interventions       Other:    As far as possible, the palliative care team has discussed with patient / health care proxy about goals of care / treatment preferences for patient. HISTORY:     History obtained from: medical record/family/nursing    CHIEF COMPLAINT:  not applicable    HPI/SUBJECTIVE:    The patient is:   [] Verbal and participatory  [x] Non-participatory due to: Able to answer some yes/no questions, but falls to sleep quickly    Clinical Pain Assessment (nonverbal scale for severity on nonverbal patients):   Clinical Pain Assessment  Severity: 0     Activity (Movement): Lying quietly, normal position    Duration: for how long has pt been experiencing pain (e.g., 2 days, 1 month, years)  Frequency: how often pain is an issue (e.g., several times per day, once every few days, constant)     FUNCTIONAL ASSESSMENT:     Palliative Performance Scale (PPS):        PSYCHOSOCIAL/SPIRITUAL SCREENING:     Palliative IDT has assessed this patient for cultural preferences / practices and a referral made as appropriate to needs (Cultural Services, Patient Advocacy, Ethics, etc.)    Any spiritual / Anabaptist concerns:  [] Yes /  [x] No   If \"Yes\" to discuss with pastoral care during IDT     Does caregiver feel burdened by caring for their loved one:   [x] Yes /  [] No /  [] No Caregiver Present/Available [] No Caregiver [] Pt Lives at Facility  If \"Yes\" to discuss with social work during IDT     Anticipatory grief assessment:   [x] Normal  / [] Maladaptive     If \"Maladaptive\" to discuss with social work during IDT    ESAS Anxiety:      ESAS Depression:          REVIEW OF SYSTEMS:     Positive and pertinent negative findings in ROS are noted above in HPI. The following systems were [] reviewed / [x] unable to be reviewed as noted in HPI  Other findings are noted below. Systems: constitutional, ears/nose/mouth/throat, respiratory, gastrointestinal, genitourinary, musculoskeletal, integumentary, neurologic, psychiatric, endocrine.  Positive findings noted below.  Modified ESAS Completed by: provider   Fatigue: 10 Drowsiness: 10     Pain: 0     Nausea: 10   Anorexia: 10 Dyspnea: 10           Stool Occurrence(s): 1        PHYSICAL EXAM:     From RN flowsheet:  Wt Readings from Last 3 Encounters:   03/23/23 135 lb 2.3 oz (61.3 kg)   01/25/23 123 lb (55.8 kg)   01/23/23 121 lb (54.9 kg)     Blood pressure 108/60, pulse 84, temperature 99 °F (37.2 °C), resp. rate 19, height 5' 6\" (1.676 m), weight 135 lb 2.3 oz (61.3 kg), SpO2 100 %.     Pain Scale 1: Adult Nonverbal Pain Scale  Pain Intensity 1: 0  Pain Onset 1: .  Pain Location 1: Leg  Pain Orientation 1: Right  Pain Description 1: Other (comment) (Unable to verbalize)  Pain Intervention(s) 1: Medication (see MAR)  Last bowel movement, if known:     Constitutional: Ill-appearing, frail, weak, NAD  Eyes: pupils equal, bilateral icterus  ENMT: no nasal discharge, dry mucous membranes  Cardiovascular: LVAD sound  Respiratory: breathing not labored, symmetric, ventilator to trach  Gastrointestinal: Distended and firm  Musculoskeletal: no deformity, no tenderness to palpation  Skin: warm, dry  Neurologic: drowsy, nodding head yes/no to some questions intermittently, movement in his upper right extremities, able to briefly lift and extend right arm to wave/  Psychiatric: Unable to assess  Other:       HISTORY:     Active Problems:    CHF (congestive heart failure) ([facility] [address] (12/12/2022)      LVAD (left ventricular assist device) present ([facility] [address] (3/20/2023)    Past Medical History:   Diagnosis Date    CAD (coronary artery disease) 11/10/2016    NSTEMI & 2 stents    Deafness 10/28/2012    DM (diabetes mellitus) (HCC)     Elevated cholesterol     Hypertension     NSTEMI (non-ST elevated myocardial infarction) ([facility] [address] 11/10/2016      Past Surgical History:   Procedure Laterality Date    COLONOSCOPY N/A 6/28/2018    COLONOSCOPY performed by Jose Cifuentes MD at [facility] ENDOSCOPY    COLONOSCOPY N/A 1/18/2023    COLONOSCOPY performed by Kristen Joy Sandeep Colon MD at [facility] ENDOSCOPY    [address]  11/11/2016    2 stents      Family History   Problem Relation Age of Onset    Heart Disease Father     Heart Attack Father     Hypertension Mother     Elevated Lipids Brother     Elevated Lipids Brother     No Known Problems Sister     Elevated Lipids Brother     No Known Problems Son     No Known Problems Daughter     Anesth Problems Neg Hx       History reviewed, no pertinent family history. Social History     Tobacco Use    Smoking status: Never     Passive exposure: Never    Smokeless tobacco: Never   Substance Use Topics    Alcohol use:  Yes     Alcohol/week: 2.0 standard drinks     Types: 1 Cans of beer, 1 Shots of liquor per week     Comment: rarely     Allergies   Allergen Reactions    Ambien [Zolpidem] Other (comments)     Causes extreme confusion      Current Facility-Administered Medications   Medication Dose Route Frequency    gabapentin (NEURONTIN) capsule 200 mg  200 mg Oral BID    traZODone (DESYREL) tablet 25 mg  25 mg Oral QHS    L.acidophilus-paracasei-S.thermophil-bifidobacter (RISAQUAD) 8 billion cell capsule  1 Capsule Nasogastric DAILY    insulin lispro (HUMALOG) injection   SubCUTAneous Q12H    0.9% sodium chloride infusion 250 mL  250 mL IntraVENous PRN    bicarbonate dialysis (PRISMASOL) BG K 4/Ca 2.5 5000 ml solution   Extracorporeal DIALYSIS CONTINUOUS    levETIRAcetam (KEPPRA) injection 500 mg  500 mg IntraVENous Q12H    meropenem (MERREM) 1 g in 0.9% sodium chloride (MBP/ADV) 50 mL MBP  1 g IntraVENous Q12H    [Held by provider] insulin NPH (NOVOLIN N, HUMULIN N) injection 10 Units  10 Units SubCUTAneous Q12H    albumin human 25% (BUMINATE) solution 12.5 g  12.5 g IntraVENous Q12H    oxyCODONE IR (ROXICODONE) tablet 5 mg  5 mg Oral Q4H PRN    oxyCODONE IR (ROXICODONE) tablet 10 mg  10 mg Oral Q4H PRN    pantoprazole (PROTONIX) 40 mg in 0.9% sodium chloride 10 mL injection  40 mg IntraVENous DAILY labetaloL (NORMODYNE;TRANDATE) injection 5 mg  5 mg IntraVENous Q4H PRN    [Held by provider] heparin 25,000 units in D5W 250 ml infusion  400 Units/hr IntraVENous CONTINUOUS    0.9% sodium chloride infusion  11 mL/hr IntraVENous CONTINUOUS    albumin human 25% (BUMINATE) solution 25 g  25 g IntraVENous DIALYSIS PRN    hydrALAZINE (APRESOLINE) 20 mg/mL injection 5 mg  5 mg IntraVENous Q6H PRN    hydrALAZINE (APRESOLINE) tablet 5 mg  5 mg Oral PRN    aspirin chewable tablet 81 mg  81 mg Per NG tube DAILY    epoetin heidy-epbx (RETACRIT) injection 10,000 Units  10,000 Units SubCUTAneous Q TUE, THU & SAT    EPINEPHrine (ADRENALIN) 10 mg in 0.9% sodium chloride 250 mL infusion  0-10 mcg/min IntraVENous TITRATE    NOREPINephrine (LEVOPHED) 8 mg in 5% dextrose 250mL (32 mcg/mL) infusion  0.5-16 mcg/min IntraVENous TITRATE    [Held by provider] colchicine tablet 0.6 mg  0.6 mg Oral DAILY    heparin (porcine) 1,000 unit/mL injection 1,700 Units  1,700 Units InterCATHeter DIALYSIS PRN    And    heparin (porcine) 1,000 unit/mL injection 1,500 Units  1,500 Units InterCATHeter DIALYSIS PRN    balsam peru-castor oiL (VENELEX) ointment   Topical BID    [Held by provider] acetaminophen (TYLENOL) solution 650 mg  650 mg Oral Q4H PRN    [Held by provider] acetaminophen (TYLENOL) suppository 650 mg  650 mg Rectal Q4H PRN    HYDROmorphone (DILAUDID) injection 0.5 mg  0.5 mg IntraVENous Q3H PRN    HYDROmorphone (DILAUDID) injection 1 mg  1 mg IntraVENous Q4H PRN    [Held by provider] heparin 25,000 units in D5W 250 ml infusion  12-25 Units/kg/hr IntraVENous TITRATE    glucose chewable tablet 16 g  4 Tablet Oral PRN    glucagon (GLUCAGEN) injection 1 mg  1 mg IntraMUSCular PRN    sodium chloride (NS) flush 5-40 mL  5-40 mL IntraVENous Q8H    sodium chloride (NS) flush 5-40 mL  5-40 mL IntraVENous PRN    naloxone (NARCAN) injection 0.4 mg  0.4 mg IntraVENous PRN    ELECTROLYTE REPLACEMENT NOTE: Nurse to review Serum Potassium and Magnesuim levels and Initiate Electrolyte Replacement Protocol as needed  1 Each Other PRN    dextrose 10 % infusion 0-250 mL  0-250 mL IntraVENous PRN    [Held by provider] acetaminophen (TYLENOL) tablet 650 mg  650 mg Oral Q6H PRN    ondansetron (ZOFRAN) injection 4 mg  4 mg IntraVENous Q4H PRN    albuterol-ipratropium (DUO-NEB) 2.5 MG-0.5 MG/3 ML  3 mL Nebulization Q6H PRN    bisacodyL (DULCOLAX) suppository 10 mg  10 mg Rectal DAILY PRN    dexmedeTOMidine in 0.9 % NaCl (PRECEDEX) 400 mcg/100 mL (4 mcg/mL) infusion soln  0.1-1.5 mcg/kg/hr IntraVENous TITRATE          LAB AND IMAGING FINDINGS:     Lab Results   Component Value Date/Time    WBC 10.1 03/23/2023 04:35 AM    HGB 8.2 (L) 03/23/2023 04:35 AM    PLATELET 235 78/41/2867 04:35 AM     Lab Results   Component Value Date/Time    Sodium 134 (L) 03/23/2023 04:35 AM    Potassium 4.8 03/23/2023 04:35 AM    Chloride 103 03/23/2023 04:35 AM    CO2 27 03/23/2023 04:35 AM    BUN 30 (H) 03/23/2023 04:35 AM    Creatinine 1.40 (H) 03/23/2023 04:35 AM    Calcium 10.9 (H) 03/23/2023 04:35 AM    Magnesium 3.2 (H) 03/23/2023 04:35 AM    Phosphorus 3.5 03/23/2023 04:35 AM      Lab Results   Component Value Date/Time    Alk.  phosphatase 388 (H) 03/23/2023 04:35 AM    Protein, total 6.5 03/23/2023 04:35 AM    Albumin 3.4 (L) 03/23/2023 04:35 AM    Globulin 3.1 03/23/2023 04:35 AM     (H) 03/10/2023 07:37 AM     Lab Results   Component Value Date/Time    INR 1.4 (H) 03/23/2023 04:35 AM    Prothrombin time 13.9 (H) 03/23/2023 04:35 AM    aPTT 45.2 (H) 03/09/2023 06:26 PM      Lab Results   Component Value Date/Time    Iron 37 03/05/2023 05:09 AM    TIBC 152 (L) 03/05/2023 05:09 AM    Iron % saturation 24 03/05/2023 05:09 AM    Ferritin 595 (H) 03/05/2023 05:09 AM      No results found for: PH, PCO2, PO2  No components found for: [place]   Lab Results   Component Value Date/Time    CK 76 03/05/2023 05:09 AM    CK - MB 5.3 (H) 11/10/2016 03:44 PM                Total time:35 min  .

## 2023-03-23 NOTE — PROGRESS NOTES
0800: Bedside and Verbal shift change report given to [address] [place] [address] (oncoming nurse) by Sergio Benz (offgoing nurse). Report included the following information SBAR and Kardex. 1200: Bedside and Verbal shift change report given to Naty SANDOVAL (oncoming nurse) by [address] [place] [address] (offgoing nurse). Report included the following information SBAR and Kardex.

## 2023-03-23 NOTE — PROGRESS NOTES
Comprehensive Nutrition Assessment    Type and Reason for Visit: Reassess    Nutrition Recommendations/Plan:     Continue TF of Vital 1.5 @ 40 ml/hr with 1 packet Prosource TID and flushes of 30 mL H2O q 4 hours. Use Banatrol Flakes for diarrhea/ liquid stool. Current formula remains most appropriate for overall GI tolerance, elevated lipase, elevated t. Bili and LFTs d/t its structure lipids. Vital 1.5 fat content: 47.5% MCT, 52.5% LCT    If no improvement in diarrhea, ?if would benefit from pancreatic enzymes. This should be based on non-MCT fat content which works out to ~26-29 gm/day    If concerns for feeding with pancreatitis, consider advancing DHT / replacing with longer tube to feed past ligament of treitz. Pt would not be a good candidate for TPN given liver fx. Malnutrition Assessment:  Malnutrition Status: Moderate malnutrition (03/09/23 1427)    Context:  Acute illness     Findings of the 6 clinical characteristics of malnutrition:   Energy Intake:  Mild decrease in energy intake (specify) (TF off/ at trickle for past 48 hours)  Weight Loss:  Unable to assess - d/t fluid    Body Fat Loss:  Mild body fat loss, Buccal region   Muscle Mass Loss: Moderate muscle mass loss, Clavicles (pectoralis & deltoids), Temples (temporalis), Thigh (quadriceps)  Fluid Accumulation:  Mild, Genitals, Extremities, Generalized   Strength:  Not performed        Nutrition Assessment:    PMHx: CAD s/p PCI x 2, HFrEF with EF 25-30%, DM, HTN, hypercholesterolemia, Nstemi, CKD stage III. Admitted 1/26 d/t ongoing symptoms r/t his HFrEF and LVAD work-up. Tx to CVICU on 2/3 for [facility] placement and ongoing LVAD workup. Noted, as part of LVAD work-up PTA, pt underwent EGD with bx on 1/18/23 which path revealed well-differentiated neuroendocrine tumor. Oncology consulted, no absolute contraindications to LVAD. Nephrology following for TANNER on CKD 3. LVAD placed 2/15.   Pt extubated 2/17 but then had to be re-intubated 2/18 for placement of RVAD 2/18. Had plans to start CVVHD 2/17 however pt too unstable ON; started post RVAD (Impella RP support device). CRRT started 2/18. Tube feeding initiated 2/19. Liver failure with elevated T-bili (hemolysis and/or ischemic vs congestive hepatopathy). Impella removed 3/1. Code neuro on 3/1 and 3/2, SAH noted on CT. Unable to obtain MRI d/t LVAD. AC stopped. Extubated to BiPAP on 3/2, but re-intubated same day. Elevated lipase 3/5 and climbing - ?pancreatitis, possibly 2/2 hypercalcemia. Hypercalcemia - s/p Calcitonin, Sensipar increased. CRRT stopped 3/5 - transitioned to iHD on 3/6 --> back on CRRT as of 3/15. Trach placed 3/7. SLP following for in-line PMV trial when appropriate, but mentation had precluded trials. SAH persistent on head CT.      3/23:  follow-up. Chart/labs/ meds reviewed. Not much new to add. Continues to tolerate TF at goal and seems like is having less stooling. Family meeting tomorrow to include ethics. Wife has been leaning towards discontinuation of LVAD. RD will continue to follow. 3/20: follow-up. Noted events since last week. GI was consulted, but unclear if still following. Tube feeds were held during work-up. US of abdomen 3/16: Heterogeneous liver suggestive of cirrhosis with trace perihepatic ascites, No biliary dilation. Stones and sludge in the gallbladder. CT of abdomen 3/17: Pancreatitis without evidence for hemorrhagic conversion; Cholelithiasis. Lipase remains elevated. However, pt was resumed on his feeds and has been tolerating at goal.  However, RN notes incontinent of very liquid stool. MD adding probiotic and also states requested Banatrol Flakes. Discussed K+ content and she states that can be controlled with his CRRT (was resumed 3/15). Palliative continues to follow. BG within parameters without use of basal insulin. Overall, pt is more alert and following commands. Able to move extremities. Weight continues to trend back down towards admission weight. 3/13:  TF back at goal.  BG more elevated, but pt was only receiving NPH 5 units BID as it was cut back last week when TF rate was decreased. However, now that back at goal rate, previously NPH of 10 units BID is being resumed. Also noted for more loose stools. Lipase up further, however pancreas and CBD unable to be assessed on ABD CT on 3/10 as it was obscured by bowel gas; abd CT with evidence of chronic hepatic congestion and possible cirrhosis. LFTs and T bili up further. Hepatology consulted. Impression: doubts cirrhosis, suspects the change in liver morphology on US that led radiologist to suggest cirrhosis is present is simply d/t severe passive congestion and swelling of the liver and suspects will improve if pump improves. Notes that continued severe biventricualr failure can result in cirrhosis over time. Family meetings scheduled weekly. Unclear if pancreatitis has been r/o since technically it was not visualized. I do not see that GI has been consulted and not sure if would be of benefit. Stools are looser than documented last week, ?if has any pancreatic insufficiency. Already on higher MCT tube feeds. Spoke with RN Roya Manley - states they believe everything is r/t heart and no overt GI intolerance concerns. Pt has not had any further vomiting. Has loose stools, but states they are manageable. Weight relatively stable since last week, still up from euvolemic wt with edema noted below. Wife was present at bedside today. No questions. 3/9: TF held 3/8 for N/V yesterday, resumed today (3/9) @ trickle - 20 mL/hr. Per RN, pt was tolerating at goal up until trach and yesterday pt was vomiting blood that was from trach site. Has done well with trickle feeds so far today and giving Zofran prophylactically - pt did tolerate a packet of Prosource today. T bili, LFTs, lipase all trending up - plans to monitor. +jaundice. RN states this is the first she is reading about the pancreatitis - it hasn't been brought up as a major concern in regards to nutrition. Respiratory rate too high for in-line PMV. HD today - received 3/6, 3/7, none 3/8. Off pressors, antimicrobials, sedation. Insulin adjustments per Diabetes with reduced TF. Pt would not be a good candidate for TPN given worsening liver fx. Hopefully will be able to tolerate increases in tube feeds soon. Weight up d/t fluid. Updated malnutrition assessment - continues to meet moderate PCM criteria, but worsening. Difficult to assess fat loss and wt loss with edema present, so could very well meet severe PCM criteria. Will continue to monitor. TF at goal of Vital 1.5 @ 40 mL/hr with 1 packet Prosource TID and flushes of 30 mL h2o q 4 hours provides 880 mL, 1500 kcal, 104 gm pro, 165 gm CHO, 669+270+315=3523 mL free H2o. This meets 100% kcal needs and 95% pro needs respectively. 3/6: follow-up. Events noted above. Failed extubation and now discussions re: trach. Off pressors and propofol. Precedex for sedation. Elevated lipase 3/5, ? pancreatitis. Already on more appropriate formula with higher MCT ratio. Continues on TF at goal without overt signs of intolerance. DHT replaced on 3/1 - imaging 3/5 reveals tip in duodenal bulb. Vital 1.5 @ 40 mL/hr with 1 packet Prosource TID and flushes of 30 mL H2o q 4 hours providing 880 mL, 1500 kcal, 104 gm pro, 165 gm CHO, and [phone]+636=8387 mL free H2o. Weight trending up, currently 3 L net positive. 1-2+ pitting edema. BG lower normal - insulin reduced. 3/1: pt remains intubated, on CRRT. Impella removed today. Propofol was resumed on 2/28, but only running @ 5.9 mL/hr which provides 156 kcal.  TF via NG of Vital 1.5 @ 40 mL/hr with 1 packet Prosource TID and flushes of 30 mL h2o q 4 hours providing 880 mL, 1500 kcal, 104 gm pro, 165 gm CHO, 669+270+055=4791 mL free H2o.   With propofol = 1656 kcal which meets 104% kcal needs. Off levo, remains on epi. Insulin gtt off - now on NPH BID. BG within target range. Low K+ and Phos, repletion ordered. Having soft BMs. Given malnutrition and overall more stable/ less pressors, current TF regimen remains appropriate. 2/27: follow-up. Remains vented on CRRT. Tolerating TF at goal.  Having looses BM 2-3x/day, but nothing excessive per RN. Propofol was at 3.9 mL/hr (103 kcal), but currently off and doing well. Vital 1.5 @ 35 ml/hr with 3 packets Prosource daily; minimal water flush of 30 ml q 4 hr providing 770 ml, 1320 calories, 96 gm protein, 42 gm fat, 43 mEq potassium and 940 ml free water. K+ WNL, Mg high, Phos low - improved since yesterday. BG well controlled, remains on insulin drip, but plans to adjust to basal insulin soon. Given pt is more stable and with malnutrition, recommend slightly increasing TF to better meet est needs without propofol running. Even if resumed at previous rate, would still be within kcal range/ not significant overfeeding. Will slightly increase TF to Vital 1.5 @ 40 mL/hr with 1 packet Prosource TID and flushes of 30 mL h2o q 4 hours provides 880 mL, 1500 kcal, 104 gm pro, 165 gm CHO, 669+270+685=0222 mL free H2o. This meets 100% kcal needs and 95% pro needs respectively. 2/20: Pt extubated 2/17 but then had to be re-intubated 2/18 for placement of RVAD 2/18. Plan to start CVVHD 2/17 however pt too unstable ON; started post RVAD (Impella RP support device). Tube feeding ordered yesterday; RD consult placed. Liver failure with elevated T-bili (hemolysis and/or ischemic vs congestive hepatopathy). Mr Rupali Barfield has tolerated EN well thus far. Last BM however 2/14-bowel regimen is ordered. Recommend increasing Pericolace to bid since pt requiring a fair amount of Fentanyl. Will change formula to Vital 1.5 to avoid fiber and fat content (structured lipid 2/2 elevated bilirubin).  New goal: Vital 1.5 @ 35 ml/hr with 3 packets Prosource daily; continue minimal water flush of 30 ml q 4 hr. This will provide 770 ml, 1320 calories, 96 gm protein, 42 gm fat, 43 mEq potassium and 940 ml free water (tube feeding/flush) per day to meet 88% protein needs. Tube feeding and propofol will meet 100% estimated energy needs (1695 calories per day). 2/17: pt had LVAD placed 2/15 and remains intubated. Spoke with RN yesterday and again today - no plans to start TF, working on extubation now. RN states OG is too small for tube feeds anyway and worries it would clog, PO meds held. Pt would need a DHT. Even if pt extubated, will be a slow progression of diet over weekend. DHT may need to be considered, but also with Carafate QID, tube feeds would need to be held at various points throughout the day which makes regimen more complicated, so would consider intermittent vs nocturnal feeds if needed. PO intake was improving pre-op, but overall still with significant wt loss and meeting moderate malnutrition status prior to surgery. Weight up as expected post-op (d/t fluid). Currently 150 lb, but was 123 lb on 2/13 standing scale when last seen by this service. Ve Observed 8.68 l/min, tMax:102.4 °F  [place] EEN ~1800 kcal, which is similar to current EEN. Therefore, will not adjust given likelihood of extubation soon. If TF needed, recommend 4 cartons of Nepro - giving 1 carton over 1 hour QID that are timed around the Carafate. 4 cartons of Nepro daily with 60 mL H2O flushes 4x/day provide 948 mL, 1706 kcal, 77 gm pro, 153 gm CHO, and 692+508=996 mL free H2O.     Nutritionally Significant Medications:  Buminate, Risaquad, Merrem, Protonix, SSI   PRN: dilaudid, oxycodone, zofran      Estimated Daily Nutrient Needs:  Energy Requirements Based On: Formula  Weight Used for Energy Requirements: Admission (54.4 kg)  Energy (kcal/day): 6260-3368 (25-30 kcal/kg; ~1500 kcal/day using [id number])  Weight Used for Protein Requirements: Admission  Protein (g/day): 110 (2 gm/kg)  Method Used for Fluid Requirements: Standard renal  Fluid (ml/day): 500 mL + OP    Nutrition Related Findings:   Edema: Facial: Scleral (3/23/2023  8:00 AM)  Generalized: Trace (3/23/2023  8:00 AM)  LLE: Pitting; 2+ (3/23/2023  8:00 AM)  LUE: Trace (3/23/2023  8:00 AM)  RLE: Pitting; 2+ (3/23/2023  8:00 AM)  RUE: Trace (3/23/2023  8:00 AM)    Last BM: 03/23/23, Watery    Wounds: Multiple, Deep tissue injury, Unstageable, Surgical incision  Per WOCN 3/20-     1. POA Unstageable Pressure Injury     2. Left Heel Deep Tissue Injury       3. Sacral deep tissue injury    4. LVAD drive line site  Devitalized green/brown tissue under drive line and around stitch. Current Nutrition Therapies:  Diet: NPO  Nutrition Support: TF via DHT of Vital 1.5 @ 40 mL/hr with 1 packet Prosource TID, flushes of 30 mL h2o q 4 hours. Anthropometric Measures:  Height: 5' 6\" (167.6 cm)  Ideal Body Weight (IBW): 142 lbs (65 kg)  Admission Body Weight: 120 lb  Current Body Wt:  61.3 kg (135 lb 2.3 oz), 95.2 % IBW.  Bed scale  Current BMI (kg/m2): 21.8        Weight Adjustment: No adjustment                 BMI Category: Underweight (BMI less than 22) age over 72    Last 3 Recorded Weights in this Encounter    03/21/23 0707 03/22/23 0400 03/23/23 0537   Weight: 61.6 kg (135 lb 12.9 oz) 61.5 kg (135 lb 9.3 oz) 61.3 kg (135 lb 2.3 oz)     Last 3 Recorded Weights in this Encounter    03/18/23 0533 03/19/23 0600 03/20/23 0700   Weight: 63 kg (138 lb 14.2 oz) 62.1 kg (136 lb 14.5 oz) 62.8 kg (138 lb 7.2 oz)     Last 3 Recorded Weights in this Encounter    03/07/23 0537 03/08/23 0200 03/09/23 0323   Weight: 66.1 kg (145 lb 11.6 oz) 67.6 kg (149 lb 0.5 oz) 70.7 kg (155 lb 13.8 oz)     Last 3 Recorded Weights in this Encounter    03/04/23 0700 03/05/23 0700 03/06/23 0218   Weight: 62.3 kg (137 lb 5.6 oz) 62.9 kg (138 lb 10.7 oz) 64.5 kg (142 lb 3.2 oz)     Last 3 Recorded Weights in this Encounter 02/25/23 2630 02/26/23 0431 02/27/23 0430   Weight: 67.7 kg (149 lb 4 oz) 65.4 kg (144 lb 2.9 oz) 62.2 kg (137 lb 2 oz)     Wt Readings from Last 10 Encounters:   02/27/23 62.2 kg (137 lb 2 oz)   01/25/23 55.8 kg (123 lb)   01/23/23 54.9 kg (121 lb)   01/21/23 54.4 kg (120 lb)   01/20/23 52.2 kg (115 lb)   01/20/23 52.2 kg (115 lb)   01/19/23 53 kg (116 lb 13.5 oz)   12/19/22 58.5 kg (129 lb)   12/16/22 57.4 kg (126 lb 8.7 oz)   12/05/22 59 kg (130 lb)       Nutrition Diagnosis:   Inadequate oral intake related to impaired respiratory function, cardiac dysfunction as evidenced by intubation, nutrition support-enteral nutrition  Increased nutrient needs related to increased demand for energy/nutrients, renal dysfunction as evidenced by dialysis, wounds  Altered GI function related to  (?pancreatitis) as evidenced by diarrhea      Nutrition Interventions:   Food and/or Nutrient Delivery: Continue tube feeding  Nutrition Education/Counseling: No recommendations at this time  Coordination of Nutrition Care: Continue to monitor while inpatient       Goals:  Previous Goal Met: Progressing toward goal(s)  Goals: other (specify)  Specify Other Goals: tolerate TF at goal rate to meet EEN over next 3-7 days; decreased liquid stool in next 7 days    Nutrition Monitoring and Evaluation:   Behavioral-Environmental Outcomes: None identified  Food/Nutrient Intake Outcomes: Enteral nutrition intake/tolerance  Physical Signs/Symptoms Outcomes: Biochemical data, GI status, Fluid status or edema, Hemodynamic status, Nutrition focused physical findings, Weight, Skin    Discharge Planning:     Too soon to determine    Recent Labs     03/23/23  0435 03/22/23  0520 03/21/23  0224   * 130* 120*   BUN 30* 31* 26*   CREA 1.40* 1.45* 1.50*   * 135* 136   K 4.8 4.5 4.5    104 106   CO2 27 25 25   CA 10.9* 10.3* 9.7   PHOS 3.5 3.5 2.7   MG 3.2* 3.2* 3.1*       Recent Labs     03/23/23  0435 03/22/23  0520 03/21/23  0224   ALT 104* 99* 105*   * 234* 280*   * 380* 379*   TBILI 23.0* 23.2* 24.3*   TP 6.5 6.6 6.3*   ALB 3.4* 3.4* 3.4*   GLOB 3.1 3.2 2.9   LPSE >3,000* >3,000* >3,000*       Recent Labs     03/23/23  0435 03/22/23  0520   WBC 10.1 9.6   HGB 8.2* 7.9*   HCT 26.0* 25.9*    177       Recent Labs     03/21/23  0224   PREALB 9.3*       Prealbumin   Date Value Ref Range Status   03/21/2023 9.3 (L) 20.0 - 40.0 mg/dL Final   03/14/2023 10.2 (L) 20.0 - 40.0 mg/dL Final   03/07/2023 13.1 (L) 20.0 - 40.0 mg/dL Final     Recent Labs     03/22/23  2114 03/22/23  0825 03/21/23 2122 03/21/23  0928 03/20/23  2151   GLUCPOC 118* 116 110 114 124*       Lab Results   Component Value Date/Time    Hemoglobin A1c 7.7 (H) 01/11/2023 06:14 PM    Hemoglobin A1c 6.5 (H) 12/06/2022 03:53 AM    Hemoglobin A1c 7.0 (H) 10/12/2022 09:10 AM         Nayely Calderón RD   Available via [employer]

## 2023-03-23 NOTE — PROGRESS NOTES
[facility]    ICU TEAM Progress Note    Name: Jem Brain   : 1950   MRN: [id number]   Date: 3/23/2023      I  Subjective:   Progress Note: 3/23/2023      Reason for ICU Admission: Cardiomyopathy status post LVAD implantation     Interval history:  71year old male PMH as noted above admitted to [facility] on  due to ongoing symptoms secondary to his HFrEF. Treated for possible CAP, and diuresed for acute on chronic HFrEF. Nephrology following for TANNER on CKD 3. AHF team recommended transfer to CVICU for [facility] placement and ongoing LVAD workup, with placement 2/15. Pt extubated , however with development of worsening renal dysfunction overnight and unable to tolerate CRRT so was reintubated and taken for Impella RP placement for right-sided support in a.m. of  and CRRT resumed. Impella removed on 3/1. Overnight 3/1-3/1 CVA noted with SAH.  3/2 SAH enlarged. Held anticoagulation. Failed extubation 3/2. Trach placed 3/7. Transitioned to [facility] [place]. Repeat CTH 3/10 w/ persistent SAH, heparin held.       Overnight Events:   3/23 - tolerated 6 hours PST 15    Active Problem List:     Problem List  Date Reviewed: 2023            Codes Class    Dyslipidemia, goal LDL below 70 ICD-10-CM: E78.5  ICD-9-CM: 272.4         LVAD (left ventricular assist device) present ([facility] ICD-10-CM: Z95.811  ICD-9-CM: V43.21         PAD (peripheral artery disease) ([facility] ICD-10-CM: I73.9  ICD-9-CM: 975.5         Systolic CHF, acute on chronic (HCC) ICD-10-CM: I50.23  ICD-9-CM: 428.23, 428.0         Receiving inotropic medication ICD-10-CM: Z79.899  ICD-9-CM: V49.89         CHF (congestive heart failure) ([facility]) ICD-10-CM: I50.9  ICD-9-CM: 428.0         CKD stage 3 due to type 2 diabetes mellitus ([facility] ICD-10-CM: E11.22, N18.30  ICD-9-CM: 250.40, 585.3         S/P CABG x 3 ICD-10-CM: Z95.1  ICD-9-CM: V45.81     Overview Signed 2018 11:20 AM by SCAR Nuñez     Coronary Artery Bypass Grafting x 3, LIMA to LAD, RSVG to OM, RSVG to RCA  Right GSV EVH             Coronary artery disease involving native coronary artery of native heart without angina pectoris ICD-10-CM: I25.10  ICD-9-CM: 414.01         Hypertension, essential ICD-10-CM: I10  ICD-9-CM: 401.9         Colon cancer screening ICD-10-CM: Z12.11  ICD-9-CM: V76.51         Nonrheumatic aortic valve stenosis ICD-10-CM: I35.0  ICD-9-CM: 424.1         Bruit of right carotid artery ICD-10-CM: R09.89  ICD-9-CM: 785. 9         Type 2 diabetes mellitus with hyperglycemia (HCC) ICD-10-CM: E11.65  ICD-9-CM: 250.00         Deafness ICD-10-CM: H91.90  ICD-9-CM: 389.9         Cramp of limb ICD-10-CM: R25.2  ICD-9-CM: 729.82            Past Medical History:      has a past medical history of CAD (coronary artery disease) (11/10/2016), Deafness (10/28/2012), DM (diabetes mellitus) ([facility] Elevated cholesterol, Hypertension, and NSTEMI (non-ST elevated myocardial infarction) ([facility] (11/10/2016). Past Surgical History:      has a past surgical history that includes hx appendectomy; pr unlisted procedure cardiac surgery (11/11/2016); colonoscopy (N/A, 6/28/2018); and colonoscopy (N/A, 1/18/2023). Home Medications:     Prior to Admission medications    Medication Sig Start Date End Date Taking? Authorizing Provider   polyethylene glycol (Miralax) 17 gram/dose powder Take 17 g by mouth daily as needed for Constipation. Yes Provider, Historical   furosemide (LASIX) 20 mg tablet Take 1 Tablet by mouth daily as needed (for weight greater than 120 lb by 2-3 lb or shortness of breath). 1/23/23  Yes Kacie Linton NP   glipiZIDE (GLUCOTROL) 5 mg tablet Take 1 tablet by mouth twice daily 12/2/22  Yes Francis Chen MD   ipratropium (ATROVENT) 21 mcg (0.03 %) nasal spray 2 Sprays by Both Nostrils route every twelve (12) hours.  11/21/22  Yes Francis Chen MD   ergocalciferol (ERGOCALCIFEROL) 1,250 mcg (50,000 unit) capsule Take 1 Capsule by mouth every seven (7) days.  Patient taking differently: Take 50,000 Units by mouth every seven (7) days. Mondays 10/14/22  Yes Berny Cedeno MD   Januvia 50 mg tablet Take 1 tablet by mouth once daily 22  Yes Berny Cedeno MD   rosuvastatin (CRESTOR) 20 mg tablet Take 1 Tablet by mouth nightly. 22  Yes Nathen Thomas MD   aspirin delayed-release 81 mg tablet Take 81 mg by mouth daily. Yes Provider, Historical   zolpidem (AMBIEN) 5 mg tablet Take 1 Tablet by mouth nightly as needed for Sleep. Max Daily Amount: 5 mg. 23   Berny Cedeno MD   nitroglycerin (NITROSTAT) 0.4 mg SL tablet 1 Tablet by SubLINGual route every five (5) minutes as needed for Chest Pain. Up to 3 doses. 22   Jan Mckeon MD       Allergies/Social/Family History: Allergies   Allergen Reactions    Ambien [Zolpidem] Other (comments)     Causes extreme confusion      Social History     Tobacco Use    Smoking status: Never     Passive exposure: Never    Smokeless tobacco: Never   Substance Use Topics    Alcohol use:  Yes     Alcohol/week: 2.0 standard drinks     Types: 1 Cans of beer, 1 Shots of liquor per week     Comment: rarely      Family History   Problem Relation Age of Onset    Heart Disease Father     Heart Attack Father     Hypertension Mother     Elevated Lipids Brother     Elevated Lipids Brother     No Known Problems Sister     Elevated Lipids Brother     No Known Problems Son     No Known Problems Daughter     Anesth Problems Neg Hx        Review of Systems:   Not able to obtain due to patient medical condition    Objective:   Vital Signs:  Visit Vitals  /60   Pulse 85   Temp 98.1 °F (36.7 °C)   Resp 23   Ht 5' 6\" (1.676 m)   Wt 61.3 kg (135 lb 2.3 oz)   SpO2 100%   BMI 21.81 kg/m²    O2 Flow Rate (L/min): 20 l/min O2 Device: Tracheostomy, Ventilator Temp (24hrs), Av.2 °F (36.8 °C), Min:98.1 °F (36.7 °C), Max:98.6 °F (37 °C)    CVP (mmHg): 11 mmHg (23 1400)      Intake/Output:     Intake/Output Summary (Last 24 hours) at 3/23/2023 1500  Last data filed at 3/23/2023 1400  Gross per 24 hour   Intake 1455.8 ml   Output 1618.4 ml   Net -162.6 ml       Physical Exam:    General:  Awake, sleepy at times, chronically ill looking on mechanical ventilation and CRRT   Eyes:  Sclera anicteric. Pupils equally round and reactive to light. Mouth/Throat: Mucous membranes normal, oral pharynx clear   Neck: Line in place bilaterally   Lungs:   Coarse crepitation bilaterally   CV:  LVAD hum   Abdomen:   Soft, non-tender. bowel sounds normal. non-distended   Extremities: No cyanosis or edema   Neuro: Open eyes spontaneously, wave and smile, at times follow simple command mainly on the right side, did not appreciate movement on left arm, minimal movement on left leg       LABS AND  DATA: Personally reviewed  Recent Labs     03/23/23 0435 03/22/23 0520   WBC 10.1 9.6   HGB 8.2* 7.9*   HCT 26.0* 25.9*    177     Recent Labs     03/23/23 0435 03/22/23  0520   * 135*   K 4.8 4.5    104   CO2 27 25   BUN 30* 31*   CREA 1.40* 1.45*   * 130*   CA 10.9* 10.3*   MG 3.2* 3.2*   PHOS 3.5 3.5     Recent Labs     03/23/23 0435 03/22/23  0520   * 380*   TP 6.5 6.6   ALB 3.4* 3.4*   GLOB 3.1 3.2   LPSE >3,000* >3,000*     Recent Labs     03/23/23 0435 03/22/23  0520   INR 1.4* 1.4*   PTP 13.9* 14.0*      No results for input(s): PHI, PCO2I, PO2I, FIO2I in the last 72 hours. No results for input(s): CPK, CKMB, TROIQ, BNPP in the last 72 hours.     Hemodynamics:   PAP: PAP Systolic: 44 (35/85/47 6657) CO: CO (l/min): 4.8 l/min (03/10/23 0900)   Wedge: PAOP (PCWP) (mmhg): 36 mmHg (02/03/23 1830) CI: CI (l/min/m2): 2.7 l/min/m2 (03/10/23 0900)   CVP:  CVP (mmHg): 11 mmHg (03/23/23 1400) SVR:       PVR:       Ventilator Settings:  Mode Rate Tidal Volume Pressure FiO2 PEEP   Spontaneous   350 ml  15 cm H2O 40 % 5 cm H20     Peak airway pressure: 21 cm H2O    Minute ventilation: 6.08 l/min        MEDS: Reviewed    Chest X-Ray:  CXR Results  (Last 48 hours)                 03/23/23 0431  XR CHEST PORT Final result    Impression:  No definite change bilateral pleural effusions and bibasilar atelectasis. Narrative:  INDICATION: s/p VAD intubated, impella       EXAMINATION:  AP CHEST, PORTABLE       COMPARISON: March 21, 2023       FINDINGS: Single AP portable view of the chest demonstrates no change in   position of the lines and tubes. The cardiomediastinal silhouette is unchanged. Bibasilar atelectasis and pleural effusions without definite change. No   pneumothorax. Assessment and Plan:   -Cardiomyopathy status post HeartMate 3 LVAD as destination therapy on April 15, 2023:  -Acute kidney injury on chronic kidney disease requiring CRRT:  - Hepatic congestion with elevated bilirubin:  - Right SUPA ischemic stroke with left hemiparesis  -Acute on chronic hypoxic respiratory failure, status post tracheostomy:  - Pancreatitis:  - Critical illness myopathy:     NEUROLOGICAL: Acute encephalopathy, critical illness polyneuropathy   No acute changes in his neurological finding, left hemiparesis. On aspirin. Seems that his alertness is improving slowly  Keppra [facility] ppx  Gabapentin 200 bid forLE pain  Trazodone 25 QHS for sleep  PULMONOLOGY: Acute proximal respiratory failure, status post tracheostomy 7 March 2023   -Protective ventilatory strategy  -Daily SBT  -Pulmonary hygiene     CARDIOVASCULAR: Ischemic and nonischemic cardiomyopathy, left ventricular ejection fraction 25 to 30%, status post LVAD 15 February 2023, status post Impella right side removed 1 March 2023, cardiorenal syndrome   -Appreciate heart failure and cardiothoracic surgery input and management.  Family meeting Friday  -Continue aspirin  -Off pressors     GASTROINTESTINAL: Hepatic congestion, hyperbilirubinemia, possible cirrhosis, pancreatitis   -Continue enteral feeds  -Continue GI prophylaxis  -Total bilirubin continue to trend up, lipase remains elevated     RENAL/ELECTROLYTE/FLUIDS: Acute on chronic renal failure   -Continue hemodialysis with continuous renal replacement therapy, 0 factor  -Strict I's and O's  -Place electrolytes as indicated  -Nephrology following, I appreciate the recommendation and management     ENDOCRINE:   -Maintain blood glucose levels between 140-180   -Sliding scale insulin   HEMATOLOGY/ONCOLOGY: Acute on chronic anemia, gastric neuroendocrine tumor   Transfuse for hemoglobin below 7  Off heparin or other anticoagulation at this time  ID/MICRO:   Patient on meropenem at least since the 16th. Received multiple antibiotic regimen before that. DISPOSITION  Stay in ICU    CRITICAL CARE CONSULTANT NOTE  I had a face to face encounter with the patient, reviewed and interpreted patient data including clinical events, labs, images, vital signs, I/O's, and examined patient. I have discussed the case and the plan and management of the patient's care with the consulting services, the bedside nurses and the respiratory therapist.      NOTE OF PERSONAL INVOLVEMENT IN CARE   This patient has a high probability of imminent, clinically significant deterioration, which requires the highest level of preparedness to intervene urgently. I participated in the decision-making and personally managed or directed the management of the following life and organ supporting interventions that required my frequent assessment to treat or prevent imminent deterioration. I personally spent 30 minutes of critical care time. This is time spent at this critically ill patient's bedside actively involved in patient care as well as the coordination of care and discussions with the patient's family. This does not include any procedural time which has been billed separately.     Autumn Fink DO  Staff Intensivist  [facility]  3/23/2023

## 2023-03-23 NOTE — PROGRESS NOTES
1200: Bedside shift change report given to Naty MILAN RN (oncoming nurse) by Kiley Resendiz RN (offgoing nurse). Report included the following information SBAR, Intake/Output, MAR, Recent Results, and Cardiac Rhythm NSR .     1320: SBT started    1500: spoke with Regis Guan DO regarding continued leg pain and difficulty resting at night, orders received for gabapentin BID and trazadone nightly    1850: Brandy Flores RN at bedside, CRRT rinsed back and to be restarted    2000: Bedside shift change report given to Navin Mortensen, RN (oncoming nurse) by Lieutenant Leventhal, RN (offgoing nurse). Report included the following information SBAR, Intake/Output, MAR, Recent Results, and Cardiac Rhythm NSR . Shift summary: pt mainted MAPs >65 for majority of shift with transient drops to 50s recovering without intervention. Multiple family members to see pt.  SBT for ~7hours

## 2023-03-23 NOTE — PROGRESS NOTES
2000- report received from [facility]. All care assumed    0800- Bedside and Verbal shift change report given to [address] [place] [address] (oncoming nurse) by Mary Lou Hollins (offgoing nurse). Report included the following information SBAR, Intake/Output, MAR, Recent Results, Med Rec Status, Cardiac Rhythm NSR, and Alarm Parameters .

## 2023-03-23 NOTE — PROGRESS NOTES
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 Ryan Dixon, Dr. Evia Heimlich, bedside RN, palliative care and SW with plans to observe SBT, persistent liver failure and pancreatitis; if no improvement or worsening over the next week then will discuss timing of withdrawal of support; if catastrophic even between now and next week, family wishes to discontinue LVAD support  Family meeting tomorrow. Will include ethics.  Wife has been leaning towards discontinuation of LVAD. Patient was seen and examined by me. I reviewed the note and data. I have discussed and agree with the plan as noted in the ANP note beneath with the following corrections: none. Time of visit: 15 minutes     Malachi Barbour MD PhD  Jeniffer Carmen [id number]              RECOMMENDATIONS:  Continue LVAD speed at 4700rpm  Use hydralazine 5mg IV q4h prn MAP>85 or SBP > 110 mmHg  No beta-blockers due to hypotension and RV condition prior to LVAD  Cannot tolerate ARB/ARNi due to hypotension and renal function  Cannot tolerate spironolactone due to hyperkalemia and renal function  Cannot tolerate jardiance due to declined renal function  Previously discontinued corlanor due to SVT  Discontinued amio due to intermitted heart blocks under pacemaker  Continue CRRT to keep consistently goal CVP 8-10  Obtain follow up ECHO to determine if we can increase pump speed  Keep K+ >4 and Mg >2  ID recommendations appreciated - pan culture NGTD. Wound culture sent  No colchicine per nephrology  Antibiotics per intensivist  Continue to hold coumadin  AC on hold for now due to slightly progressive SAH on head CT. Previously D/w intensivist and family, likely more harm from resuming than continuing without. Consider repeating head CT if patient's family are going to continue care. Hepatology recommendations appreciated   Cannot tolerate statins due to abnormal LFT  Management of tube feeds in light of pancreatitis per intensivist.    ?? If Stopping keppra would help  Probiotic for diarrhea   Daily dressing changes to Drive line exit site  Pulmonary hygiene- continue SBT   VAD education with caregivers/patient when able by VAD coordinator  D/w  bedside staff and intensivist       INTERVAL HISTORY:  MAPs 60s-70s, HR 80s-90s.    CVP 10  I/O  even  -weight same  Hgb steady; INR 1.4; pBNP continues to decrease  TB slight decrease at 23  LFTs similar  Lipase remains > 3000  K 4.8; creat steady at 1.4  -Mr. Kendall is awake and waved with his left hand today. More interactive. C/o pain in his legs. Does fall back asleep fairly quickly. Uncertain if his hearing aid is on. Discussed care with bedside RN       IMPRESSION:  Acute on chronic combined systolic/diastolic  heart failure  Stage D, NYHA class IV symptoms   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, LVEF 20% (by echo 1/2023) and 23% (by cMRI 1/3/23)  Cardiac MRI suggestive of ischemic cardiomyopathy  PYP equivocal  S/p HeartMate 3  LVAD-DT (2/15/23)  C/b RV failure requiring Impella RP placed 2/18/23 and discontinued 3/1/23  C/b acute on chronic renal failure, requiring CRRT  C/b hepatic failure, TB 12 (peak 15.3)  C/b lactic acidosis, persistent  C/b persistent septic shock c/b vasodilation and high outputs, on multiple antibiotics, procalcitonin persistently elevated  C/b R SUPA occlusion with small area of matched perfusion defect - subacute infarct c/b left sided hemiparesis  C/b pancreatitis  C/b failure to extubate x 2; anticipating tracheostomy this week  Coronary artery disease  CAD s/p CABG x 2: further disease best managed medically due to small vessel size   At risk of sudden cardiac death  Peripheral arterial disease  Bilateral hydronephrosis s/p andrzej  Cardiac risk factors:  HTN  HL  TRISTAN, STOP-BANG 4  DM2  CKD, stage 3  MOCA from 2/9 19/30, consistent with mild cognitive impairment  Hard of hearing  Gastric Neuroendocrine Tumor, elevated gastrin and chromogranin A  Septic shock, improving   Left sided weakness noted night 3/1.   +SAH and subacute occlusion distal R cerebral artery   Poorly healing wounds:  sacral deep tissue; left heel deep tissue; driveline site   MELD na score of 35; 52.6% estimated 3 month mortality         LIFE GOALS: not re-addressed today   Patient's personal goals included: having a few more years with family  Important upcoming milestones or family events: None at this time   The patient identified the following as important for living well: being at home, not being SOB            CXR (3/5/23) mild pulmonary edema. Small pleural effusions and bibasilar airspace opacities. Head CTA (3/2/23) Occlusion distal right anterior cerebral artery, with associated small area of matched perfusion defect and cortical enhancement, consistent with subacute infarct. Diminutive and irregular right vertebral artery, occluded at the V3-4 junction, age indeterminate extensive multifocal atherosclerosis in the intracranial and extracranial circulation. CARDIAC IMAGING:   Echo (3/13/23)    Left Ventricle: Severely reduced left ventricular systolic function with a visually estimated EF of 25 - 30%. Left ventricle is dilated. Normal wall thickness. Unable to assess wall motion. Right Ventricle: Moderately reduced systolic function. TAPSE is abnormal. TAPSE is 1.1 cm. LVEDD 5.3cm     ECHO- 3/6/23       Left Ventricle: Severely reduced left ventricular systolic function with a visually estimated EF of 15 - 20%. Left ventricle is moderately dilated. LVAD is present. Right Ventricle: Right ventricle is moderately dilated. Mildly reduced systolic function. Echo 2/19/23    Left Ventricle: Severely reduced left ventricular systolic function with a visually estimated EF of 20 - 25%. Not well visualized. Left ventricle size is normal. Increased wall thickness. Unable to assess wall motion. Abnormal diastolic function. LVAD is present. LVAD inflow cannula was not well visualized. Right Ventricle: Not well visualized. Right ventricle is mildly dilated. Moderately reduced systolic function. TAPSE is abnormal.    Mitral Valve: Severe annular calcification at the anterior and posterior leaflets of the mitral valve. Mild regurgitation. Left Atrium: Left atrium is dilated. Right Atrium: Right atrium is dilated.     Technical qualifiers: Echo study was technically difficult and technically difficult with poor endocardial visualization. Echo 1/27/23    Left Ventricle: EF by visual approximation is 20%. Left ventricle is mildly dilated. Mitral Valve: Moderately thickened leaflet, at the anterior and posterior leaflets. Moderately calcified leaflet. Moderate regurgitation. Left Atrium: Left atrium is moderately dilated. Technical qualifiers: Echo study was technically difficult with poor endocardial visualization. Contrast used: Definity. Limited study, not all structures viewed     Echo 1/9/23   Left Ventricle: Severely reduced left ventricular systolic function with a visually estimated EF of 25 - 30%. Left ventricle size is normal. Mildly increased wall thickness. Echo 12/26/22    Left Ventricle: Severely reduced left ventricular systolic function with a visually estimated EF of 25 - 30%. Left ventricle size is normal. Normal wall thickness. There are regional wall motion abnormalities. Grade II diastolic dysfunction with increased LAP. Right Ventricle: Moderately reduced systolic function. TAPSE is abnormal. TAPSE is 1.1 cm. Aortic Valve: Mild stenosis of the aortic valve. AV peak gradient is 13 mmHg. AV peak velocity is 1.8 m/s. Mitral Valve: Not well visualized. Moderate annular calcification at the posterior leaflet of the mitral valve. Mild to moderate regurgitation. Tricuspid Valve: Mildly elevated RVSP. Left Atrium: Left atrium is moderately dilated. 12/8/22    Left Ventricle: Moderately reduced left ventricular systolic function with a visually estimated EF of 35 - 40%. Severe hypokinesis of the following segments: mid anteroseptal, apical anterior, apical septal, apical inferior and apical lateral. Severe hypokinesis of the apex. Mitral Valve: Severely thickened leaflet, at the anterior and posterior leaflets. Severely calcified leaflet, at the anterior and posterior leaflets. Mild annular calcification of the mitral valve. Moderate regurgitation.     Left Atrium: Left atrium is mildly dilated. Contrast used: Definity. limited study     EKG 12/22/22 ST, Biatria enlargement, marked ST abnormality     [facility] 12/6/22  1. Normal LVEDP  2. Severe native multivessel coronary artery disease  3. Patent LIMA to LAD and vein graft to distal RCA  4. Recurrent ISR in OM1 stent with now 60 to 70% restenosis  5. Recoil of left main and circumflex stent with now recurrent 40 to 50% stenosis. 6.  Progression of ostial left main disease now to about 60% stenosis  7. Progression of disease in jailed first marginal branch now with diffuse 90% stenosis  8. High-grade stenosis in the mid to distal right potential femoral artery treated with 6 x 40 mm impact drug-coated balloon angioplasty to reduce the stenosis to less than 40%        HEMODYNAMICS:  RHC 1/9/23  PA 20/9, RA 3, PCWP 8, CI 1.8     CPEST too ill   6MW 300 feet previously      LVAD INTERROGATION:  Device interrogated in person  Device function normal, normal flow, no events  LVAD   Pump Speed (RPM): 4700  Pump Flow (LPM): 3.4  MAP: 72  PI (Pulsitility Index): 5.1  Power: 3.3   Test: No  Back Up  at Bedside & Labeled: Yes  Power Module Test: No  Driveline Site Care: No  Driveline Dressing: Clean, Dry, and Intact  Testing  Alarms Reviewed: Yes  Back up SC speed: 4700  Back up Low Speed Limit: 4300  Emergency Equipment with Patient?: Yes  Emergency procedures reviewed?: Yes  Drive line site inspected?: No  Drive line intergrity inspected?: Yes  Drive line dressing changed?: No    PHYSICAL EXAM:  Visit Vitals  /60   Pulse 85   Temp 98.1 °F (36.7 °C)   Resp 24   Ht 5' 6\" (1.676 m)   Wt 135 lb 2.3 oz (61.3 kg)   SpO2 100%   BMI 21.81 kg/m²     REVIEW OF SYSTEMS:  Review of Systems   Unable to perform ROS: Acuity of condition. Able to nod some.   Points to legs for pain       Physical Assessment:   General Appearance: awake, ill appearing adult male resting in bed in NAD; appears stated age  Eyes: sclera anicteric  Mouth/Throat: moist mucous membranes; oral pharynx clear  Neck: supple; trach  Pulmonary:  clear/dim to auscultation bilaterally; trach/vent  Cardiovascular: regular rate and rhythm; VAD hum  Abdomen: soft, non-tender, distended; hyper active bowel sounds normal  Musculoskeletal: no swelling or deformity; weak on L  Extremities: no edema; non palpable distal pulses   Skin: warm and dry;  drive line dressing with scant tan drainage   Neuro: opens eyes; follows some commands;  lMAE  Psych: unable to assess         PAST MEDICAL HISTORY:  Past Medical History:   Diagnosis Date    CAD (coronary artery disease) 11/10/2016    NSTEMI & 2 stents    Deafness 10/28/2012    DM (diabetes mellitus) ([facility] [address]     Elevated cholesterol     Hypertension     NSTEMI (non-ST elevated myocardial infarction) ([facility] [address] 11/10/2016       PAST SURGICAL HISTORY:  Past Surgical History:   Procedure Laterality Date    COLONOSCOPY N/A 6/28/2018    COLONOSCOPY performed by Alonso Boggs MD at [facility] ENDOSCOPY    COLONOSCOPY N/A 1/18/2023    COLONOSCOPY performed by Angella White MD at [facility] ENDOSCOPY    [address]  11/11/2016    2 stents       FAMILY HISTORY:  Family History   Problem Relation Age of Onset    Heart Disease Father     Heart Attack Father     Hypertension Mother     Elevated Lipids Brother     Elevated Lipids Brother     No Known Problems Sister     Elevated Lipids Brother     No Known Problems Son     No Known Problems Daughter     Anesth Problems Neg Hx        SOCIAL HISTORY:  Social History     Socioeconomic History    Marital status:    Tobacco Use    Smoking status: Never     Passive exposure: Never    Smokeless tobacco: Never   Vaping Use    Vaping Use: Never used   Substance and Sexual Activity    Alcohol use: Yes     Alcohol/week: 2.0 standard drinks     Types: 1 Cans of beer, 1 Shots of liquor per week     Comment: rarely    Drug use:  No Sexual activity: Yes     Social Determinants of Health     Financial Resource Strain: Medium Risk    Difficulty of Paying Living Expenses: Somewhat hard   Food Insecurity: Food Insecurity Present    Worried About Running Out of Food in the Last Year: Never true    Ran Out of Food in the Last Year: Often true       LABORATORY RESULTS:     Labs Latest Ref Rng & Units 3/23/2023 3/22/2023 3/21/2023 3/20/2023 3/19/2023 3/18/2023 3/18/2023   WBC 4.1 - 11.1 K/uL 10.1 9.6 9.4 7.7 6.6 - 7.0   RBC 4.10 - 5.70 M/uL 2.60(L) 2.55(L) 2.59(L) 2.54(L) 2.14(L) - 2.32(L)   Hemoglobin 12.1 - 17.0 g/dL 8. 2(L) 7. 9(L) 8.0(L) 8.2(L) 7. 0(L) - 7. 6(L)   Hematocrit 36.6 - 50.3 % 26. 0(L) 25. 9(L) 26. 1(L) 24. 6(L) 21. 9(L) - 23. 2(L)   MCV 80.0 - 99.0 . 0(H) 101. 6(H) 100. 8(H) 96.9 102. 3(H) - 100. 0(H)   Platelets 552 - 874 K/uL 198 177 201 205 222 - 244   Lymphocytes 12 - 49 % 10(L) 9(L) 9(L) 9(L) 10(L) - 11(L)   Monocytes 5 - 13 % 9 10 10 8 14(H) - 15(H)   Eosinophils 0 - 7 % 4 3 3 4 3 - 4   Basophils 0 - 1 % 1 0 1 0 1 - 1   Albumin 3.5 - 5.0 g/dL 3.4(L) 3.4(L) 3.4(L) 3. 3(L) 3.4(L) 3.5 3. 3(L)   Calcium 8.5 - 10.1 MG/DL 10. 9(H) 10. 3(H) 9.7 9.7 10.0 10. 5(H) 10. 9(H)   Glucose 65 - 100 mg/dL 134(H) 130(H) 120(H) 135(H) 140(H) 145(H) 81   BUN 6 - 20 MG/DL 30(H) 31(H) 26(H) 28(H) 26(H) 29(H) 28(H)   Creatinine 0.70 - 1.30 MG/DL 1.40(H) 1.45(H) 1.50(H) 1.63(H) 1.74(H) 1.82(H) 2.06(H)   Sodium 136 - 145 mmol/L 134(L) 135(L) 136 134(L) 138 136 137   Potassium 3.5 - 5.1 mmol/L 4.8 4.5 4.5 4.0 4.3 4.4 4.3   TSH 0.36 - 3.74 uIU/mL - - - - - - -   LDH 85 - 241 U/L 267(H) 264(H) 263(H) 274(H) 240 - 247(H)   Some recent data might be hidden     Lab Results   Component Value Date/Time    TSH 1.25 03/17/2023 02:10 AM    TSH 3.52 01/28/2023 05:26 AM    TSH 2.12 12/27/2022 02:36 PM    TSH 4.80 (H) 12/06/2022 03:53 AM    TSH 5.39 (H) 10/12/2022 09:10 AM    TSH 3.53 02/03/2022 11:47 AM    TSH 5.790 (H) 11/21/2019 04:45 PM    TSH 3.08 06/22/2018 01:53 PM    TSH 4.250 05/26/2015 09:43 AM       ALLERGY:  Allergies   Allergen Reactions    Ambien [Zolpidem] Other (comments)     Causes extreme confusion        CURRENT MEDICATIONS:    Current Facility-Administered Medications:     L.acidophilus-paracasei-S.thermophil-bifidobacter (RISAQUAD) 8 billion cell capsule, 1 Capsule, Nasogastric, DAILY, Colleen Astorga NP, 1 Capsule at 03/23/23 0813    insulin lispro (HUMALOG) injection, , SubCUTAneous, Q12H, Justin Wall MD    0.9% sodium chloride infusion 250 mL, 250 mL, IntraVENous, PRN, Katie Roberts MD    bicarbonate dialysis (PRISMASOL) BG K 4/Ca 2.5 5000 ml solution, , Extracorporeal, DIALYSIS CONTINUOUS, Jadiel Galvan MD, Last Rate: 1,600 mL/hr at 03/23/23 0631, New Bag at 03/23/23 0631    levETIRAcetam (KEPPRA) injection 500 mg, 500 mg, IntraVENous, Q12H, Meghan Rosenberg MD, 500 mg at 03/23/23 6936    [COMPLETED] meropenem (MERREM) 1 g in 0.9% sodium chloride 20 mL IV syringe, 1 g, IntraVENous, NOW, 1 g at 03/15/23 1734 **FOLLOWED BY** meropenem (MERREM) 1 g in 0.9% sodium chloride (MBP/ADV) 50 mL MBP, 1 g, IntraVENous, Q12H, Meghan Rosenberg MD, Last Rate: 16.7 mL/hr at 03/23/23 0546, 1 g at 03/23/23 0546    [Held by provider] insulin NPH (NOVOLIN N, HUMULIN N) injection 10 Units, 10 Units, SubCUTAneous, Q12H, Cathy Sheldon CNS, 10 Units at 03/15/23 2112    albumin human 25% (BUMINATE) solution 12.5 g, 12.5 g, IntraVENous, Q12H, Lizette Linton, NP, 12.5 g at 03/23/23 9582    oxyCODONE IR (ROXICODONE) tablet 5 mg, 5 mg, Oral, Q4H PRN, Taz Younger G DO, 5 mg at 03/22/23 1321    oxyCODONE IR (ROXICODONE) tablet 10 mg, 10 mg, Oral, Q4H PRN, Taz HA DO, 10 mg at 03/23/23 0007    pantoprazole (PROTONIX) 40 mg in 0.9% sodium chloride 10 mL injection, 40 mg, IntraVENous, DAILY, Ramos MOLINA MD, 40 mg at 03/23/23 8681    labetaloL (NORMODYNE;TRANDATE) injection 5 mg, 5 mg, IntraVENous, Q4H PRN, Ramos MOLINA MD, 5 mg at 03/09/23 0563    [Held by provider] heparin 25,000 units in D5W 250 ml infusion, 400 Units/hr, IntraVENous, CONTINUOUS, Lizette Linton NP, Stopped at 03/10/23 1350    0.9% sodium chloride infusion, 11 mL/hr, IntraVENous, CONTINUOUS, Holland Johnson MD, Last Rate: 6 mL/hr at 03/23/23 0546, 6 mL/hr at 03/23/23 0546    albumin human 25% (BUMINATE) solution 25 g, 25 g, IntraVENous, DIALYSIS PRN, Aldo Hernandez MD, 25 g at 03/13/23 0539    hydrALAZINE (APRESOLINE) 20 mg/mL injection 5 mg, 5 mg, IntraVENous, Q6H PRN, Bobbette Sicard, MD, 5 mg at 03/10/23 1316    hydrALAZINE (APRESOLINE) tablet 5 mg, 5 mg, Oral, PRN, Bobbette Sicard, MD    aspirin chewable tablet 81 mg, 81 mg, Per NG tube, DAILY, Corbin Renee MD, 81 mg at 03/23/23 0813    epoetin heidy-epbx (RETACRIT) injection 10,000 Units, 10,000 Units, SubCUTAneous, Q TUE, THU & SAT, Abou-Assi, Ariel Cordero MD, 10,000 Units at 03/21/23 2058    EPINEPHrine (ADRENALIN) 10 mg in 0.9% sodium chloride 250 mL infusion, 0-10 mcg/min, IntraVENous, TITRATE, Holland Johnson MD, Stopped at 03/05/23 0920    NOREPINephrine (LEVOPHED) 8 mg in 5% dextrose 250mL (32 mcg/mL) infusion, 0.5-16 mcg/min, IntraVENous, TITRATE, Holland Johnson MD, Stopped at 03/22/23 0115    [Held by provider] colchicine tablet 0.6 mg, 0.6 mg, Oral, DAILY, Lizette Linton NP, 0.6 mg at 03/14/23 0848    heparin (porcine) 1,000 unit/mL injection 1,700 Units, 1,700 Units, InterCATHeter, DIALYSIS PRN, 1,700 Units at 03/13/23 0819 **AND** heparin (porcine) 1,000 unit/mL injection 1,500 Units, 1,500 Units, InterCATHeter, DIALYSIS PRN, Vitaliy Gaines DO, 1,500 Units at 03/13/23 0819    balsam peru-castor oiL (VENELEX) ointment, , Topical, BID, Arleth Lucero MD, Given at 03/22/23 1748    [Held by provider] acetaminophen (TYLENOL) solution 650 mg, 650 mg, Oral, Q4H PRN, Linh Esqueda MD, 650 mg at 03/14/23 1924    [Held by provider] acetaminophen (TYLENOL) suppository 650 mg, 650 mg, Rectal, Q4H PRN, Ryan Otero MD, 650 mg at 02/17/23 2013    HYDROmorphone (DILAUDID) injection 0.5 mg, 0.5 mg, IntraVENous, Q3H PRN, Italo MOLINA MD, 0.5 mg at 03/20/23 2051    HYDROmorphone (DILAUDID) injection 1 mg, 1 mg, IntraVENous, Q4H PRN, Italo MOLINA MD, 1 mg at 03/14/23 0848    [Held by provider] heparin 25,000 units in D5W 250 ml infusion, 12-25 Units/kg/hr, IntraVENous, TITRATE, Saray Pickett MD, Stopped at 03/08/23 0515    glucose chewable tablet 16 g, 4 Tablet, Oral, PRN, Shaila, Lizette B, NP    glucagon (GLUCAGEN) injection 1 mg, 1 mg, IntraMUSCular, PRN, Shaila, Lizette B, NP    sodium chloride (NS) flush 5-40 mL, 5-40 mL, IntraVENous, Q8H, Shaila, Lizette B, NP, 10 mL at 03/23/23 0549    sodium chloride (NS) flush 5-40 mL, 5-40 mL, IntraVENous, PRN, Shaila, Lizette B, NP, 40 mL at 02/17/23 1818    naloxone (NARCAN) injection 0.4 mg, 0.4 mg, IntraVENous, PRN, Shaila, Lizette B, NP    ELECTROLYTE REPLACEMENT NOTE: Nurse to review Serum Potassium and Magnesuim levels and Initiate Electrolyte Replacement Protocol as needed, 1 Each, Other, PRN, Shaila, Lizette B, NP    dextrose 10 % infusion 0-250 mL, 0-250 mL, IntraVENous, PRN, Shaila, Lizette B, NP, Last Rate: 150 mL/hr at 02/24/23 0220, 75 mL at 02/24/23 0220    [Held by provider] acetaminophen (TYLENOL) tablet 650 mg, 650 mg, Oral, Q6H PRN, Shaila, Lizette B, NP, 650 mg at 03/13/23 2114    ondansetron (ZOFRAN) injection 4 mg, 4 mg, IntraVENous, Q4H PRN, Shaila, Lizette B, NP, 4 mg at 03/09/23 1212    albuterol-ipratropium (DUO-NEB) 2.5 MG-0.5 MG/3 ML, 3 mL, Nebulization, Q6H PRN, Lizette Linton, NP    bisacodyL (DULCOLAX) suppository 10 mg, 10 mg, Rectal, DAILY PRN, Lizette Linton, NP, 10 mg at 02/22/23 0839    dexmedeTOMidine in 0.9 % NaCl (PRECEDEX) 400 mcg/100 mL (4 mcg/mL) infusion soln, 0.1-1.5 mcg/kg/hr, IntraVENous, TITRATE, Bradley Johnson MD, Stopped at 03/08/23 2300    PATIENT CARE TEAM:  Patient Care Team:  Hollie Morrow MD as PCP - General (Family Medicine)  Hollie Morrow MD as PCP - [facility] Empaneled Provider  Sagrario Restrepo MD (Cardiovascular Disease Physician)  Jose Selby MD (Gastroenterology)  Travon Mercado MD (Cardiothoracic Surgery)  Siena Street MD (Cardiovascular Disease Physician)  Sherre Apgar, MD (Nephrology)  Booker Aguilera MD (Pulmonary Disease)  Khushbu Reeves MD ([facility] Vascular Surgery)     Thank you for allowing me to participate in this patient's care. Garrick Todd NP   [address] [place] [address], [address]  Phone: [phone]      On this date 3/23/2023, I have spent a total time of  25 minutes personally reviewing new vitals, test results, notes, telemetry/EKG, face to face encounter/physical exam of patient, writing orders, performing medical decision making, and documenting.

## 2023-03-24 NOTE — PROGRESS NOTES
RENAL  PROGRESS NOTE        Subjective:    Seen and examined on CV     Objective:   VITALS SIGNS:    Visit Vitals  BP (!) 102/59   Pulse 85   Temp 99 °F (37.2 °C)   Resp 21   Ht 5' 6\" (1.676 m)   Wt 61.7 kg (136 lb 0.4 oz)   SpO2 100%   BMI 21.95 kg/m²       O2 Device: Tracheostomy, Ventilator   O2 Flow Rate (L/min): 20 l/min   Temp (24hrs), Av °F (37.2 °C), Min:98.1 °F (36.7 °C), Max:99.5 °F (37.5 °C)         PHYSICAL EXAM:  Intubated       DATA REVIEW:     INTAKE / OUTPUT:   Last shift:       07 - 1900  In: 249.9 [I.V.:139.9]  Out: -   Last 3 shifts: 1901 -  0700  In: 2569.3 [I.V.:479.3]  Out: 2397.8 [Drains:500]    Intake/Output Summary (Last 24 hours) at 3/24/2023 0951  Last data filed at 3/24/2023 0900  Gross per 24 hour   Intake 1845.41 ml   Output 1553.2 ml   Net 292.21 ml           LABS:   Recent Labs     23  0400 23  0435 23  0520   WBC 9.9 10.1 9.6   HGB 8.1* 8.2* 7.9*   HCT 26.3* 26.0* 25.9*    198 177       Recent Labs     23  0400 23  0435 23  0520   * 134* 135*   K 5.0 4.8 4.5    103 104   CO2 25 27 25   * 134* 130*   BUN 29* 30* 31*   CREA 1.46* 1.40* 1.45*   CA 11.0* 10.9* 10.3*   MG 3.3* 3.2* 3.2*   PHOS 3.3 3.5 3.5   ALB 3.3* 3.4* 3.4*   TBILI 22.5* 23.0* 23.2*   ALT 99* 104* 99*   INR 1.4* 1.4* 1.4*           Assessment:  TANNER on CKD-3a: Suspect cardiorenal effects initially. Now has LVAD and  POST OP ATN. Anuric->Requiring CRRT>>iHD-> back to CRRT 3/15/23. left IJ Hermelindo catheter was last replaced on 3/11/2023 by intensivist     hypercalcemia/immobilization-    Elevated LFTs/Hyperbilirubinemia   pancreatitis- with elevated lipase;    Ischemic CM: Recurrent exacerbations. EF 20%. S/p LVAD on 2/15.  Required Impella RP for RV support  CVA  Sepsis  BPH   Well differentiated NET Grade 1: noted on EGD 23  Anemia 2 to CKD:  s/p PRBCS  Left UE basilic and radial vein thrombus  Thrombocytopenia  Myopathy  Hypermagnesemia        Plan/Recommendations:  Ct CVV[facility]-> witch to 2K Prismasol bags.  Factor rate at 0cc/hr but it will be 3.5 calcium  Weaning Levophed   Will use Biphosphonate  if family wants to continue current care   Family meeting Kary Reyes MD

## 2023-03-24 NOTE — PROGRESS NOTES
1945: Bedside and Verbal shift change report given to Alisson Kelly RN (oncoming nurse) by Shavon Trujillo RN (offgoing nurse). Report included the following information SBAR, Kardex, Procedure Summary, Intake/Output, MAR, Recent Results, and Cardiac Rhythm NSR .     2005: Dr. Juju Tapia at bedside. Pt update give. Discussed pt's low MAP. Orders received to restart levophed drip. 2010: RT at bedside. Pt vent settings switched back to AC/VC rate: 20 / tidal volume: 350 / PEEP: 5 /O2: 40%    2110: While attempting to give medications, RN unable to flush pt NG tube, and also noted that some leaking around tube. Unable to trouble shoot. Removed old tube and replaced with new NG tube. Stat abdominal xray ordered. 2220: xray tech at bedside for abdominal xray to verify placement of new NG tube    2315:Updated Dr. Juju Tapia on replacement of dobhoff. Reviewed x-ray. Orders given to restart tube feeds. 2320: tube feeds restarted at 40 ml/hr    0400: morning labs drawn    0500: chg bath completed    0745: Bedside and Verbal shift change report given to Chelsie Butcher RN (oncoming nurse) by Alisson Kelly RN (offgoing nurse). Report included the following information SBAR, Kardex, and Recent Results.

## 2023-03-24 NOTE — PROGRESS NOTES
Problem: Mobility Impaired (Adult and Pediatric)  Goal: *Acute Goals and Plan of Care (Insert Text)  Description: FUNCTIONAL STATUS PRIOR TO ADMISSION: Patient was modified independent using a rollator for functional mobility. Pt recently d/c home after previous hospital stay. Able to ambulate community distances. HOME SUPPORT PRIOR TO ADMISSION: The patient lived with spouse but did not require assist.    Physical Therapy Goals  Initiated 3/12/2023-- continue all goals 3/20/2023  1. Patient will move from supine to sit and sit to supine , scoot up and down, and roll side to side in bed with maximal assistance within 7 days. 2.  Patient will sit EOB x3 minutes with max assist within 7 days. 3.  Patient will follow 50% of commands for participation in AROM while supine within 7 days. 4.  Patient will tolerate bed in chair position 30 min BID within 7 days. 5.  Family will be independent and compliant with in PROM for all extremities within 7 days. Updated 2/13/2023  1. Patient will move from supine to sit and sit to supine in bed with modified independence within 7 day(s). MET 2/13  2. Patient will transfer from bed to chair and chair to bed with modified independence using the least restrictive device within 7 day(s). MET 2/13  3. Patient will perform sit to stand with modified independence within 7 day(s). MET 2/13  4. Patient will ambulate with modified independence for 300 feet with the least restrictive device within 7 day(s). MET 2/13  5. Patient will ascend/descend 12 stairs with 1 handrail(s) with supervision/set-up within 7 day(s). 6.  Patient will verbally and functionally recall move in the tube techniques in prep for possible LVAD within 7 days. Physical Therapy Goals  Initiated 1/28/2023-- Goals appropriate and add #6 2/6/2023  1. Patient will move from supine to sit and sit to supine  in bed with modified independence within 7 day(s).     2.  Patient will transfer from bed to chair and chair to bed with modified independence using the least restrictive device within 7 day(s). 3.  Patient will perform sit to stand with modified independence within 7 day(s). 4.  Patient will ambulate with modified independence for 300 feet with the least restrictive device within 7 day(s). 5.  Patient will ascend/descend 12 stairs with 1 handrail(s) with supervision/set-up within 7 day(s). 6.  Patient will verbally and functionally recall move in the tube techniques in prep for possible LVAD within 7 days. Outcome: Progressing Towards Goal    PHYSICAL THERAPY TREATMENT  Patient: Anival Trimble (76 y.o. male)  Date: 3/24/2023  Diagnosis: CHF (congestive heart failure) ([facility]) [I50.9] <principal problem not specified>  Procedure(s) (LRB):  LEFT GROIN RP IMPELLA INSERTION, KIARRA BY DR Danica Burr (Left) 34 Days Post-Op  Precautions: Fall (mindful movement)  Chart, physical therapy assessment, plan of care and goals were reviewed. ASSESSMENT  Patient continues with skilled PT services and is progressing towards goals. Pt more sleepy today, RN reports pain medicine earlier. Pt with less eyes open today. Pt not following commands for right UE, which has been seen in previous sessions. Pain expressed with LE ROM. Left arm movement noted today x1. Current Level of Function Impacting Discharge (mobility/balance): total A    Other factors to consider for discharge:          PLAN :  Patient continues to benefit from skilled intervention to address the above impairments. Continue treatment per established plan of care. to address goals. Recommendation for discharge: (in order for the patient to meet his/her long term goals)  To be determined:       This discharge recommendation:  Has not yet been discussed the attending provider and/or case management    IF patient discharges home will need the following DME: to be determined (TBD)       SUBJECTIVE:   Patient non verbal     OBJECTIVE DATA SUMMARY: Critical Behavior:  Neurologic State: Drowsy  Orientation Level: Unable to verbalize  Cognition: Decreased command following  Safety/Judgement: Decreased awareness of environment  Functional Mobility Training:  Bed Mobility:         Total A            Therapeutic Exercises:     ROM to bilateral LE. Tone on right LE, PROM on left    No command following for UE today       Pain Rating:  With ROM    Activity Tolerance:   Poor    After treatment patient left in no apparent distress:   Supine in bed and Heels elevated for pressure relief    COMMUNICATION/COLLABORATION:   The patients plan of care was discussed with: Registered nurse.      Edil Latham PTA   Time Calculation: 18 mins

## 2023-03-24 NOTE — DIALYSIS
CRRT / [phone]    Orders   Mode: [facility] rounding   Blood Flow Rate: 200 ml/min   Prismasol Dose: PBP: 800 ml/hr  Replacement 1: 400 ml/hr  Replacement 2: 400 ml/hr   Prismasol Concentrate: 2K/3.5Ca   Blood Warmer Temp: 37*C   Net Fluid Removal: 0 ml/hr     Metrics   BP: 141/80   HR: 91   Access Pressure: -24   Filter Pressure: 152   Return Pressure: 50   TMP: 75   Pressure Drop: 55     Access   Type & Location: LIJ non-tunneled CVC   Comments: Dressing CDI dated 03/24/23. No redness, warmth or drainage noted. Labs   HBsAg (Antigen) / date: Negative (03/13/23)   HBsAb (Antibody) / date: Susceptible (02/18/23)   Source: [facility]     Safety:   Time Out Done:   (Time) 5089   Consent obtained/signed: Verified   Education: Procedural   Primary Nurse Rpt: ADRIAN Woody, RN     Comments / Plan:   Consents, patient, code status, labs and orders verified. [facility] rounding. [id number] filter running well with no indication for change at this time. Lines visible and connections secure with blood warmer to return line at 37*C. Education & pre/post report given to primary RN.

## 2023-03-24 NOTE — PROGRESS NOTES
0800 : Assumed care of pt and assessment done. Pt awake and communicating some with the PT and Nurses. He nods appropriately and tries to follow commands. He is very tired and  nod yes to pain. 1100: Pt is very tired. He is resting well BP lower. Levophed increased to 5 MCG/min    1200 Son ,daughter and their spouses arrived to visit pt as well as patient's  brothers. Pt is very sleepy, not responding to family. 1345:  Family meeting with Dr. Addison Paz, Dr. Wai Singer, the [address] [place] [address] O officer, ANP for palliative care, bedside nurse,  wife, son and daughter. Please see Dr. Addison Paz note. Family has decided to withdrawal pt from care and turn off the LVAD tomorrow. 1430 Wife wants the dobhoff out. Dr. Wai Singer here orders received to D/C labs and stop tube feedings. Antibiotics and levophed drip will continue. CRRT will continue until tomorrow. 0 Pt is more awake and interacting with his family members. Brothers and children are here as well as his wife. 1800: Family will be leaving soon for the night and return early tomorrow. 1930: Bedside and Verbal shift change report given to Av Stovall (oncoming nurse) by Mary Mcghee RN (offgoing nurse). Report included the following information Intake/Output, MAR, Med Rec Status, and Cardiac Rhythm NSR .

## 2023-03-24 NOTE — PROGRESS NOTES
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 Kajal Mederos, Dr. Andria Mendenhall, bedside RN, palliative care and SW with plans to observe SBT, persistent liver failure and pancreatitis; if no improvement or worsening over the next week then will discuss timing of withdrawal of support; if catastrophic even between now and next week, family wishes to discontinue LVAD support  Family meeting today. Will include ethics.  Wife has been leaning towards discontinuation of LVAD. Patient was seen and examined by me. I reviewed the note and data. I have discussed and agree with the plan as noted in the ANP note beneath with the following corrections: none. Time of visit: 25 minutes     Tere Nix MD PhD  [address]                 RECOMMENDATIONS:  Continue LVAD speed at 4700rpm  Use hydralazine 5mg IV q4h prn MAP>85 or SBP > 110 mmHg  No beta-blockers due to hypotension and RV condition prior to LVAD  Cannot tolerate ARB/ARNi due to hypotension and renal function  Cannot tolerate spironolactone due to hyperkalemia and renal function  Cannot tolerate jardiance due to declined renal function  Previously discontinued corlanor due to SVT  Discontinued amio due to intermitted heart blocks under pacemaker  Continue CRRT to keep consistently goal CVP 8-10  Obtain follow up ECHO to determine if we can increase pump speed- not yet obtained   Keep K+ >4 and Mg >2  ID recommendations appreciated - pan culture NGTD. Wound culture sent  No colchicine per nephrology  Antibiotics per intensivist  Continue to hold coumadin  AC on hold for now due to slightly progressive SAH on head CT. Previously D/w intensivist and family, likely more harm from resuming than continuing without. Consider repeating head CT if patient's family are going to continue care. Hepatology recommendations appreciated   Cannot tolerate statins due to abnormal LFT  Management of tube feeds in light of pancreatitis per intensivist.    Marly Mayes placed on hold for possible contribution to pancreatitis   Probiotic for diarrhea   Daily dressing changes to Drive line exit site  Pulmonary hygiene- continue SBT   VAD education with caregivers/patient when able by VAD coordinator  D/w  bedside staff and intensivist   Family meeting this afternoon      INTERVAL HISTORY:  MAPs 60s-70s, HR 80s.  Back on levophed overnight  CVP 12  I/O  even  -weight up 1lb  Hgb steady; INR 1.4; pBNP continues to decrease  TB slight decrease at 22.5  LFTs similar  Lipase remains > 3000  K 5; creat steady at 1.46  -Mr. Kendall is awake but drowsy. Bedside RN states recent pain medications. Patient c/o abd pain and leg pain today. Limited ROS due to sedation. IMPRESSION:  Acute on chronic combined systolic/diastolic  heart failure  Stage D, NYHA class IV symptoms   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, LVEF 20% (by echo 1/2023) and 23% (by cMRI 1/3/23)  Cardiac MRI suggestive of ischemic cardiomyopathy  PYP equivocal  S/p HeartMate 3  LVAD-DT (2/15/23)  C/b RV failure requiring Impella RP placed 2/18/23 and discontinued 3/1/23  C/b acute on chronic renal failure, requiring CRRT  C/b hepatic failure, TB 12 (peak 15.3)  C/b lactic acidosis, persistent  C/b persistent septic shock c/b vasodilation and high outputs, on multiple antibiotics, procalcitonin persistently elevated  C/b R SUPA occlusion with small area of matched perfusion defect - subacute infarct c/b left sided hemiparesis  C/b pancreatitis  C/b failure to extubate x 2; anticipating tracheostomy this week  Coronary artery disease  CAD s/p CABG x 2: further disease best managed medically due to small vessel size   At risk of sudden cardiac death  Peripheral arterial disease  Bilateral hydronephrosis s/p donnelly  Cardiac risk factors:  HTN  HL  TRISTAN, STOP-BANG 4  DM2  CKD, stage 3  MOCA from 2/9 19/30, consistent with mild cognitive impairment  Hard of hearing  Gastric Neuroendocrine Tumor, elevated gastrin and chromogranin A  Septic shock, improving   Left sided weakness noted night 3/1.   +SAH and subacute occlusion distal R cerebral artery   Poorly healing wounds:  sacral deep tissue; left heel deep tissue; driveline site   MELD Na score of 35; 52.6% estimated 3 month mortality         LIFE GOALS: not re-addressed today   Patient's personal goals included: having a few more years with family  Important upcoming milestones or family events: None at this time   The patient identified the following as important for living well: being at home, not being SOB            CXR (3/5/23) mild pulmonary edema. Small pleural effusions and bibasilar airspace opacities. Head CTA (3/2/23) Occlusion distal right anterior cerebral artery, with associated small area of matched perfusion defect and cortical enhancement, consistent with subacute infarct. Diminutive and irregular right vertebral artery, occluded at the V3-4 junction, age indeterminate extensive multifocal atherosclerosis in the intracranial and extracranial circulation. CARDIAC IMAGING:     [address]     Echo (3/13/23)    Left Ventricle: Severely reduced left ventricular systolic function with a visually estimated EF of 25 - 30%. Left ventricle is dilated. Normal wall thickness. Unable to assess wall motion. Right Ventricle: Moderately reduced systolic function. TAPSE is abnormal. TAPSE is 1.1 cm. LVEDD 5.3cm     ECHO- 3/6/23       Left Ventricle: Severely reduced left ventricular systolic function with a visually estimated EF of 15 - 20%. Left ventricle is moderately dilated. LVAD is present. Right Ventricle: Right ventricle is moderately dilated. Mildly reduced systolic function. Echo 2/19/23    Left Ventricle: Severely reduced left ventricular systolic function with a visually estimated EF of 20 - 25%. Not well visualized. Left ventricle size is normal. Increased wall thickness. Unable to assess wall motion. Abnormal diastolic function. LVAD is present. LVAD inflow cannula was not well visualized. Right Ventricle: Not well visualized. Right ventricle is mildly dilated. Moderately reduced systolic function. TAPSE is abnormal.    Mitral Valve: Severe annular calcification at the anterior and posterior leaflets of the mitral valve. Mild regurgitation. Left Atrium: Left atrium is dilated. Right Atrium: Right atrium is dilated. Technical qualifiers: Echo study was technically difficult and technically difficult with poor endocardial visualization. Echo 1/27/23    Left Ventricle: EF by visual approximation is 20%. Left ventricle is mildly dilated. Mitral Valve: Moderately thickened leaflet, at the anterior and posterior leaflets. Moderately calcified leaflet. Moderate regurgitation. Left Atrium: Left atrium is moderately dilated. Technical qualifiers: Echo study was technically difficult with poor endocardial visualization. Contrast used: Definity. Limited study, not all structures viewed     Echo 1/9/23   Left Ventricle: Severely reduced left ventricular systolic function with a visually estimated EF of 25 - 30%. Left ventricle size is normal. Mildly increased wall thickness. Echo 12/26/22    Left Ventricle: Severely reduced left ventricular systolic function with a visually estimated EF of 25 - 30%. Left ventricle size is normal. Normal wall thickness. There are regional wall motion abnormalities. Grade II diastolic dysfunction with increased LAP. Right Ventricle: Moderately reduced systolic function. TAPSE is abnormal. TAPSE is 1.1 cm. Aortic Valve: Mild stenosis of the aortic valve. AV peak gradient is 13 mmHg. AV peak velocity is 1.8 m/s. Mitral Valve: Not well visualized. Moderate annular calcification at the posterior leaflet of the mitral valve. Mild to moderate regurgitation. Tricuspid Valve: Mildly elevated RVSP. Left Atrium: Left atrium is moderately dilated. 12/8/22    Left Ventricle: Moderately reduced left ventricular systolic function with a visually estimated EF of 35 - 40%. Severe hypokinesis of the following segments: mid anteroseptal, apical anterior, apical septal, apical inferior and apical lateral. Severe hypokinesis of the apex. Mitral Valve: Severely thickened leaflet, at the anterior and posterior leaflets. Severely calcified leaflet, at the anterior and posterior leaflets. Mild annular calcification of the mitral valve. Moderate regurgitation. Left Atrium: Left atrium is mildly dilated. Contrast used: Definity. limited study     EKG 12/22/22 ST, Biatria enlargement, marked ST abnormality     [facility] 12/6/22  1. Normal LVEDP  2. Severe native multivessel coronary artery disease  3. Patent LIMA to LAD and vein graft to distal RCA  4. Recurrent ISR in OM1 stent with now 60 to 70% restenosis  5. Recoil of left main and circumflex stent with now recurrent 40 to 50% stenosis. 6.  Progression of ostial left main disease now to about 60% stenosis  7. Progression of disease in jailed first marginal branch now with diffuse 90% stenosis  8. High-grade stenosis in the mid to distal right potential femoral artery treated with 6 x 40 mm impact drug-coated balloon angioplasty to reduce the stenosis to less than 40%        HEMODYNAMICS:  [facility] 1/9/23  PA 20/9, RA 3, PCWP 8, CI 1.8     CPEST too ill   6MW 300 feet previously      LVAD INTERROGATION:  Device interrogated in person  Device function normal, normal flow, no events  LVAD   Pump Speed (RPM): 4700  Pump Flow (LPM): 3.3  MAP: 72  PI (Pulsitility Index): 5.2  Power: 3   Test: No  Back Up  at Bedside & Labeled: Yes  Power Module Test: No  Driveline Site Care: No  Driveline Dressing: Clean, Dry, and Intact  Testing  Alarms Reviewed: Yes  Back up SC speed: 4700  Back up Low Speed Limit: 4300  Emergency Equipment with Patient?: Yes  Emergency procedures reviewed?: Yes  Drive line site inspected?: No (covered by dressing)  Drive line intergrity inspected?: Yes  Drive line dressing changed?: No    PHYSICAL EXAM:  Visit Vitals  BP (!) 102/59   Pulse 85   Temp 99 °F (37.2 °C)   Resp 21   Ht 5' 6\" (1.676 m)   Wt 136 lb 0.4 oz (61.7 kg)   SpO2 100%   BMI 21.95 kg/m²     REVIEW OF SYSTEMS:  Review of Systems   Unable to perform ROS: Acuity of condition. Able to nod some.   Endorses abd and leg pain Physical Assessment:   General Appearance: awake,drowsy, ill appearing adult male resting in bed in NAD; appears stated age  Eyes: sclera anicteric  Mouth/Throat: moist mucous membranes; oral pharynx clear  Neck: supple; trach  Pulmonary:  clear/dim to auscultation bilaterally; trach/vent  Cardiovascular: regular rate and rhythm; VAD hum;  slight murmur  Abdomen: tight, distended; hyper active bowel sounds normal  Musculoskeletal: no swelling or deformity; weak on L  Extremities: no edema; non palpable distal pulses   Skin: warm and dry;  drive line dressing with scant tan drainage   Neuro: opens eyes; follows some commands;  PARKINSON  Psych: unable to assess         PAST MEDICAL HISTORY:  Past Medical History:   Diagnosis Date    CAD (coronary artery disease) 11/10/2016    NSTEMI & 2 stents    Deafness 10/28/2012    DM (diabetes mellitus) ([facility] [address]     Elevated cholesterol     Hypertension     NSTEMI (non-ST elevated myocardial infarction) ([facility] [address] 11/10/2016       PAST SURGICAL HISTORY:  Past Surgical History:   Procedure Laterality Date    COLONOSCOPY N/A 6/28/2018    COLONOSCOPY performed by Luisito Crump MD at [facility] ENDOSCOPY    COLONOSCOPY N/A 1/18/2023    COLONOSCOPY performed by Adrian Angel MD at [facility] ENDOSCOPY    HX APPENDECTOMY      RI [address] 46  11/11/2016    2 stents       FAMILY HISTORY:  Family History   Problem Relation Age of Onset    Heart Disease Father     Heart Attack Father     Hypertension Mother     Elevated Lipids Brother     Elevated Lipids Brother     No Known Problems Sister     Elevated Lipids Brother     No Known Problems Son     No Known Problems Daughter     Anesth Problems Neg Hx        SOCIAL HISTORY:  Social History     Socioeconomic History    Marital status:    Tobacco Use    Smoking status: Never     Passive exposure: Never    Smokeless tobacco: Never   Vaping Use    Vaping Use: Never used   Substance and Sexual Activity    Alcohol use:  Yes Alcohol/week: 2.0 standard drinks     Types: 1 Cans of beer, 1 Shots of liquor per week     Comment: rarely    Drug use: No    Sexual activity: Yes     Social Determinants of Health     Financial Resource Strain: Medium Risk    Difficulty of Paying Living Expenses: Somewhat hard   Food Insecurity: Food Insecurity Present    Worried About Running Out of Food in the Last Year: Never true    Ran Out of Food in the Last Year: Often true       LABORATORY RESULTS:     Labs Latest Ref Rng & Units 3/24/2023 3/23/2023 3/22/2023 3/21/2023 3/20/2023 3/19/2023 3/18/2023   WBC 4.1 - 11.1 K/uL 9.9 10.1 9.6 9.4 7.7 6.6 -   RBC 4.10 - 5.70 M/uL 2.58(L) 2.60(L) 2.55(L) 2.59(L) 2.54(L) 2.14(L) -   Hemoglobin 12.1 - 17.0 g/dL 8. 1(L) 8.2(L) 7. 9(L) 8.0(L) 8.2(L) 7. 0(L) -   Hematocrit 36.6 - 50.3 % 26. 3(L) 26. 0(L) 25. 9(L) 26. 1(L) 24. 6(L) 21. 9(L) -   MCV 80.0 - 99.0 . 9(H) 100. 0(H) 101. 6(H) 100. 8(H) 96.9 102. 3(H) -   Platelets 706 - 174 K/uL 185 198 177 201 205 222 -   Lymphocytes 12 - 49 % 13 10(L) 9(L) 9(L) 9(L) 10(L) -   Monocytes 5 - 13 % 11 9 10 10 8 14(H) -   Eosinophils 0 - 7 % 4 4 3 3 4 3 -   Basophils 0 - 1 % 1 1 0 1 0 1 -   Albumin 3.5 - 5.0 g/dL 3. 3(L) 3.4(L) 3.4(L) 3.4(L) 3. 3(L) 3.4(L) 3.5   Calcium 8.5 - 10.1 MG/DL 11. 0(H) 10. 9(H) 10. 3(H) 9.7 9.7 10.0 10. 5(H)   Glucose 65 - 100 mg/dL 115(H) 134(H) 130(H) 120(H) 135(H) 140(H) 145(H)   BUN 6 - 20 MG/DL 29(H) 30(H) 31(H) 26(H) 28(H) 26(H) 29(H)   Creatinine 0.70 - 1.30 MG/DL 1.46(H) 1.40(H) 1.45(H) 1.50(H) 1.63(H) 1.74(H) 1.82(H)   Sodium 136 - 145 mmol/L 134(L) 134(L) 135(L) 136 134(L) 138 136   Potassium 3.5 - 5.1 mmol/L 5.0 4.8 4.5 4.5 4.0 4.3 4.4   TSH 0.36 - 3.74 uIU/mL - - - - - - -   LDH 85 - 241 U/L 254(H) 267(H) 264(H) 263(H) 274(H) 240 -   Some recent data might be hidden     Lab Results   Component Value Date/Time    TSH 1.25 03/17/2023 02:10 AM    TSH 3.52 01/28/2023 05:26 AM    TSH 2.12 12/27/2022 02:36 PM    TSH 4.80 (H) 12/06/2022 03:53 AM    TSH 5.39 (H) 10/12/2022 09:10 AM    TSH 3.53 02/03/2022 11:47 AM    TSH 5.790 (H) 11/21/2019 04:45 PM    TSH 3.08 06/22/2018 01:53 PM    TSH 4.250 05/26/2015 09:43 AM       ALLERGY:  Allergies   Allergen Reactions    Ambien [Zolpidem] Other (comments)     Causes extreme confusion        CURRENT MEDICATIONS:    Current Facility-Administered Medications:     gabapentin (NEURONTIN) capsule 200 mg, 200 mg, Oral, BID, Bennett Parikhget, DO, 200 mg at 03/24/23 0848    traZODone (DESYREL) tablet 25 mg, 25 mg, Oral, QHS, Aguilar, David G, DO, 25 mg at 03/23/23 2330    alteplase (CATHFLO) 1 mg in sterile water (preservative free) 1 mL injection, 1 mg, InterCATHeter, PRN, Nicole Medina MD    L.acidophilus-paracasei-S.thermophil-bifidobacter (RISAQUAD) 8 billion cell capsule, 1 Capsule, Nasogastric, DAILY, Colleen Astorga, NP, 1 Capsule at 03/24/23 0849    insulin lispro (HUMALOG) injection, , SubCUTAneous, Q12H, Justin Wall MD    0.9% sodium chloride infusion 250 mL, 250 mL, IntraVENous, PRN, Michelle Moeller MD    bicarbonate dialysis (PRISMASOL) BG K 4/Ca 2.5 5000 ml solution, , Extracorporeal, DIALYSIS CONTINUOUS, Jadiel Galvan MD, Last Rate: 1,600 mL/hr at 03/24/23 0805, New Bag at 03/24/23 0805    [Held by provider] levETIRAcetam (KEPPRA) injection 500 mg, 500 mg, IntraVENous, Q12H, Rossi Coronado MD, 500 mg at 03/23/23 8453    [COMPLETED] meropenem (MERREM) 1 g in 0.9% sodium chloride 20 mL IV syringe, 1 g, IntraVENous, NOW, 1 g at 03/15/23 2403 **FOLLOWED BY** meropenem (MERREM) 1 g in 0.9% sodium chloride (MBP/ADV) 50 mL MBP, 1 g, IntraVENous, Q12H, Rossi Coronado MD, Last Rate: 16.7 mL/hr at 03/24/23 0546, 1 g at 03/24/23 0546    [Held by provider] insulin NPH (NOVOLIN N, HUMULIN N) injection 10 Units, 10 Units, SubCUTAneous, Q12H, Cathy Sheldon, SLICK, 10 Units at 03/15/23 2112    albumin human 25% (BUMINATE) solution 12.5 g, 12.5 g, IntraVENous, Q12H, Lizette Linton NP, 12.5 g at 03/24/23 0848 oxyCODONE IR (ROXICODONE) tablet 5 mg, 5 mg, Oral, Q4H PRN, Lorena Espitiaer, DO, 5 mg at 03/24/23 3925    oxyCODONE IR (ROXICODONE) tablet 10 mg, 10 mg, Oral, Q4H PRN, Lorena Espitiaer, DO, 10 mg at 03/24/23 0615    pantoprazole (PROTONIX) 40 mg in 0.9% sodium chloride 10 mL injection, 40 mg, IntraVENous, DAILY, Walker Yan MD, 40 mg at 03/24/23 0849    labetaloL (NORMODYNE;TRANDATE) injection 5 mg, 5 mg, IntraVENous, Q4H PRN, Vidya MOLINA MD, 5 mg at 03/09/23 0307    [Held by provider] heparin 25,000 units in D5W 250 ml infusion, 400 Units/hr, IntraVENous, CONTINUOUS, Lizette Linton NP, Stopped at 03/10/23 1350    0.9% sodium chloride infusion, 11 mL/hr, IntraVENous, CONTINUOUS, Tolu Johnson MD, Last Rate: 6 mL/hr at 03/23/23 0546, 6 mL/hr at 03/23/23 0546    albumin human 25% (BUMINATE) solution 25 g, 25 g, IntraVENous, DIALYSIS PRN, Tristan Samson MD, 25 g at 03/13/23 0539    hydrALAZINE (APRESOLINE) 20 mg/mL injection 5 mg, 5 mg, IntraVENous, Q6H PRN, Mike Kirk MD, 5 mg at 03/10/23 1316    hydrALAZINE (APRESOLINE) tablet 5 mg, 5 mg, Oral, PRN, Mike Kirk MD    aspirin chewable tablet 81 mg, 81 mg, Per NG tube, DAILY, Luis Alberto Moore MD, 81 mg at 03/24/23 0849    epoetin heidy-epbx (RETACRIT) injection 10,000 Units, 10,000 Units, SubCUTAneous, Q TUE, THU & SAT, Abou-Assi, Shmuel Mccartney MD, 10,000 Units at 03/23/23 2203    EPINEPHrine (ADRENALIN) 10 mg in 0.9% sodium chloride 250 mL infusion, 0-10 mcg/min, IntraVENous, TITRATE, Tolu Johnson MD, Stopped at 03/05/23 0920    NOREPINephrine (LEVOPHED) 8 mg in 5% dextrose 250mL (32 mcg/mL) infusion, 0.5-16 mcg/min, IntraVENous, TITRATE, Tolu Johnson MD, Last Rate: 5.6 mL/hr at 03/24/23 0806, 3 mcg/min at 03/24/23 0806    [Held by provider] colchicine tablet 0.6 mg, 0.6 mg, Oral, DAILY, Lizette Linton NP, 0.6 mg at 03/14/23 0848    heparin (porcine) 1,000 unit/mL injection 1,700 Units, 1,700 Units, InterCATHeter, DIALYSIS PRN, 1,700 Units at 03/13/23 0819 **AND** heparin (porcine) 1,000 unit/mL injection 1,500 Units, 1,500 Units, InterCATHeter, DIALYSIS PRN, Jenn DO Earl, 1,500 Units at 03/13/23 5420    balsam peru-castor oiL (VENELEX) ointment, , Topical, BID, Jimmy Billings MD, Given at 03/23/23 1754    [Held by provider] acetaminophen (TYLENOL) solution 650 mg, 650 mg, Oral, Q4H PRN, Jesus Blue MD, 650 mg at 03/14/23 1924    [Held by provider] acetaminophen (TYLENOL) suppository 650 mg, 650 mg, Rectal, Q4H PRN, Jesus Blue MD, 650 mg at 02/17/23 2013    HYDROmorphone (DILAUDID) injection 0.5 mg, 0.5 mg, IntraVENous, Q3H PRN, Yamilet MOLINA MD, 0.5 mg at 03/24/23 0501    HYDROmorphone (DILAUDID) injection 1 mg, 1 mg, IntraVENous, Q4H PRN, Yamilet MOLINA MD, 1 mg at 03/14/23 0848    [Held by provider] heparin 25,000 units in D5W 250 ml infusion, 12-25 Units/kg/hr, IntraVENous, TITRATE, Leighton Carlos MD, Stopped at 03/08/23 0515    glucose chewable tablet 16 g, 4 Tablet, Oral, PRN, Shaila, Lizette B, NP    glucagon (GLUCAGEN) injection 1 mg, 1 mg, IntraMUSCular, PRN, Shaila, Lizette B, NP    sodium chloride (NS) flush 5-40 mL, 5-40 mL, IntraVENous, Q8H, Shaila, Lizette B, NP, 10 mL at 03/24/23 0502    sodium chloride (NS) flush 5-40 mL, 5-40 mL, IntraVENous, PRN, Shaila, Lizette B, NP, 40 mL at 02/17/23 1818    naloxone (NARCAN) injection 0.4 mg, 0.4 mg, IntraVENous, PRN, Shaila, Lizette B, NP    ELECTROLYTE REPLACEMENT NOTE: Nurse to review Serum Potassium and Magnesuim levels and Initiate Electrolyte Replacement Protocol as needed, 1 Each, Other, PRN, Shaila, Lizette B, NP    dextrose 10 % infusion 0-250 mL, 0-250 mL, IntraVENous, PRN, Shaila, Lizette B, NP, Last Rate: 150 mL/hr at 02/24/23 0220, 75 mL at 02/24/23 0220    [Held by provider] acetaminophen (TYLENOL) tablet 650 mg, 650 mg, Oral, Q6H PRN, Shaila, Lizette B, NP, 650 mg at 03/13/23 2114 ondansetron (ZOFRAN) injection 4 mg, 4 mg, IntraVENous, Q4H PRN, Lizette Linton, NP, 4 mg at 03/09/23 1212    albuterol-ipratropium (DUO-NEB) 2.5 MG-0.5 MG/3 ML, 3 mL, Nebulization, Q6H PRN, ShailaLaloin B, NP    bisacodyL (DULCOLAX) suppository 10 mg, 10 mg, Rectal, DAILY PRN, Lizette Linton, NP, 10 mg at 02/22/23 0839    dexmedeTOMidine in 0.9 % NaCl (PRECEDEX) 400 mcg/100 mL (4 mcg/mL) infusion soln, 0.1-1.5 mcg/kg/hr, IntraVENous, TITRATE, Megha Johnson MD, Stopped at 03/08/23 2300    PATIENT CARE TEAM:  Patient Care Team:  Mariza Connolly MD as PCP - General (Family Medicine)  Mariza Connolly MD as PCP - [facility]  Ariadna Rene MD (Cardiovascular Disease Physician)  Marbella Madrigal MD (Gastroenterology)  Jess Odell MD (Cardiothoracic Surgery)  Amanda Sutherland MD (Cardiovascular Disease Physician)  Misti Mcclain MD (Nephrology)  José Miguel Castro MD (Pulmonary Disease)  Sloan Whaley MD ([address] [place] [address] Vascular Surgery)     Thank you for allowing me to participate in this patient's care. Yanira Epps NP   [address] [place] [address], [address]  Phone: [phone]      On this date 3/24/2023, I have spent a total time of  25 minutes personally reviewing new vitals, test results, notes, telemetry/EKG, face to face encounter/physical exam of patient, writing orders, performing medical decision making, and documenting.

## 2023-03-25 NOTE — PROGRESS NOTES
Renal-note results of family meeting. Focusing on comfort going forward.  WIll see again upon request.    Cher Ybarra MD

## 2023-03-25 NOTE — DIALYSIS
CR[facility] / [phone]    Orders   Mode: CVVH rounding   Blood Flow Rate: 200 ml/min   Prismasol Dose: PBP: 800 ml/hr  Replacement 1: 400 ml/hr  Replacement 2: 400 ml/hr   Prismasol Concentrate: 2K/3.5Ca   Blood Warmer Temp: 37*C   Net Fluid Removal: 0 ml/hr     Metrics   BP: 111/52   HR: 78   Access Pressure: -48   Filter Pressure: 146   Return Pressure: 38   TMP: 97   Pressure Drop: 68     Access   Type & Location: [facility] non-tunneled CVC   Comments: Dressing CDI dated 03/24/23. No redness, warmth or drainage noted. Labs   HBsAg (Antigen) / date: Negative (03/13/23)   HBsAb (Antibody) / date: Susceptible (02/18/23)   Source: [facility]     Safety:   Time Out Done:   (Time) 0010   Consent obtained/signed: Verified   Education: Procedural   Primary Nurse Rpt: Signa Libman. LORI Pena     Comments / Plan:   Consents, patient, code status, labs and orders verified. CVVH rounding. [id number] filter running well with no indication for change at this time. Lines visible and connections secure with blood warmer to return line at 37*C. Education & pre/post report given to primary RN.

## 2023-03-25 NOTE — PROGRESS NOTES
Problem: Diabetes Self-Management  Goal: *Disease process and treatment process  Description: Define diabetes and identify own type of diabetes; list 3 options for treating diabetes. Outcome: Resolved/Not Met  Goal: *Incorporating nutritional management into lifestyle  Description: Describe effect of type, amount and timing of food on blood glucose; list 3 methods for planning meals. Outcome: Resolved/Not Met  Goal: *Incorporating physical activity into lifestyle  Description: State effect of exercise on blood glucose levels. Outcome: Resolved/Not Met  Goal: *Developing strategies to promote health/change behavior  Description: Define the ABC's of diabetes; identify appropriate screenings, schedule and personal plan for screenings. Outcome: Resolved/Not Met  Goal: *Using medications safely  Description: State effect of diabetes medications on diabetes; name diabetes medication taking, action and side effects. Outcome: Resolved/Not Met  Goal: *Monitoring blood glucose, interpreting and using results  Description: Identify recommended blood glucose targets  and personal targets. Outcome: Resolved/Not Met  Goal: *Prevention, detection, treatment of acute complications  Description: List symptoms of hyper- and hypoglycemia; describe how to treat low blood sugar and actions for lowering  high blood glucose level. Outcome: Resolved/Not Met  Goal: *Prevention, detection and treatment of chronic complications  Description: Define the natural course of diabetes and describe the relationship of blood glucose levels to long term complications of diabetes.   Outcome: Resolved/Not Met  Goal: *Developing strategies to address psychosocial issues  Description: Describe feelings about living with diabetes; identify support needed and support network  Outcome: Resolved/Not Met  Goal: *Insulin pump training  Outcome: Resolved/Not Met  Goal: *Sick day guidelines  Outcome: Resolved/Not Met  Goal: *Patient Specific Goal (EDIT GOAL, INSERT TEXT)  Outcome: Resolved/Not Met     Problem: Patient Education: Go to Patient Education Activity  Goal: Patient/Family Education  Outcome: Resolved/Not Met     Problem: Pressure Injury - Risk of  Goal: *Prevention of pressure injury  Description: Document Mike Scale and appropriate interventions in the flowsheet. Outcome: Resolved/Not Met  Note: Pressure Injury Interventions:  Sensory Interventions: Assess changes in LOC, Assess need for specialty bed, Float heels, Keep linens dry and wrinkle-free, Maintain/enhance activity level, Minimize linen layers    Moisture Interventions: Absorbent underpads, Limit adult briefs, Maintain skin hydration (lotion/cream), Minimize layers    Activity Interventions: Assess need for specialty bed, Pressure redistribution bed/mattress(bed type)    Mobility Interventions: Assess need for specialty bed, Float heels, Pressure redistribution bed/mattress (bed type), Turn and reposition approx. every two hours(pillow and wedges)    Nutrition Interventions: Document food/fluid/supplement intake    Friction and Shear Interventions: Apply protective barrier, creams and emollients, Lift sheet, Minimize layers, Transferring/repositioning devices                Problem: Patient Education: Go to Patient Education Activity  Goal: Patient/Family Education  Outcome: Resolved/Not Met     Problem: Falls - Risk of  Goal: *Absence of Falls  Description: Document Laverne Fall Risk and appropriate interventions in the flowsheet. Outcome: Resolved/Not Met  Note: Fall Risk Interventions:  Mobility Interventions: PT Consult for mobility concerns    Mentation Interventions: Door open when patient unattended    Medication Interventions: Evaluate medications/consider consulting pharmacy    Elimination Interventions:  Toileting schedule/hourly rounds    History of Falls Interventions: Door open when patient unattended         Problem: Patient Education: Go to Patient Education Activity  Goal: Patient/Family Education  Outcome: Resolved/Not Met     Problem: Pain  Goal: *Control of Pain  Outcome: Resolved/Not Met  Goal: *PALLIATIVE CARE:  Alleviation of Pain  Outcome: Resolved/Not Met     Problem: Patient Education: Go to Patient Education Activity  Goal: Patient/Family Education  Outcome: Resolved/Not Met     Problem: Patient Education: Go to Patient Education Activity  Goal: Patient/Family Education  Outcome: Resolved/Not Met     Problem: Patient Education: Go to Patient Education Activity  Goal: Patient/Family Education  Outcome: Resolved/Not Met     Problem: Nutrition Deficit  Goal: *Optimize nutritional status  Outcome: Resolved/Not Met     Problem: Ventilator Management  Goal: *Adequate oxygenation and ventilation  Outcome: Resolved/Not Met  Goal: *Patient maintains clear airway/free of aspiration  Outcome: Resolved/Not Met  Goal: *Absence of infection signs and symptoms  Outcome: Resolved/Not Met  Goal: *Normal spontaneous ventilation  Outcome: Resolved/Not Met     Problem: Patient Education: Go to Patient Education Activity  Goal: Patient/Family Education  Outcome: Resolved/Not Met     Problem: Patient Education: Go to Patient Education Activity  Goal: Patient/Family Education  Outcome: Resolved/Not Met     Problem: LVAD Post-op Day 1  Goal: Off Pathway (Use only if patient is Off Pathway)  Outcome: Resolved/Not Met  Goal: Activity/Safety  Outcome: Resolved/Not Met  Goal: Consults, if ordered  Outcome: Resolved/Not Met  Goal: Diagnostic Test/Procedures  Outcome: Resolved/Not Met  Goal: Nutrition/Diet  Outcome: Resolved/Not Met  Goal: Medications  Outcome: Resolved/Not Met  Goal: Respiratory  Outcome: Resolved/Not Met  Goal: Treatments/Interventions/Procedures  Outcome: Resolved/Not Met  Goal: Psychosocial  Outcome: Resolved/Not Met  Goal: *Hemodynamically stable without vasoactive medications  Outcome: Resolved/Not Met  Goal: *Lungs clear or at baseline  Outcome: Resolved/Not Met  Goal: *Adequate oxygenation  Outcome: Resolved/Not Met  Goal: *Mechanical ventilation discontinued  Outcome: Resolved/Not Met  Goal: *Optimal pain control at patient's stated goal  Outcome: Resolved/Not Met  Goal: *Demonstrates progressive activity  Outcome: Resolved/Not Met  Goal: *Tolerating diet  Outcome: Resolved/Not Met  Goal: *Level of consciousness returns to baseline  Outcome: Resolved/Not Met  Goal: *LVAD parameters within set limits  Outcome: Resolved/Not Met     Problem: LVAD Post-op Day 2  Goal: Off Pathway (Use only if patient is Off Pathway)  Outcome: Resolved/Not Met  Goal: Activity/Safety  Outcome: Resolved/Not Met  Goal: Consults, if ordered  Outcome: Resolved/Not Met  Goal: Diagnostic Test/Procedures  Outcome: Resolved/Not Met  Goal: Nutrition/Diet  Outcome: Resolved/Not Met  Goal: Medications  Outcome: Resolved/Not Met  Goal: Discharge Planning  Outcome: Resolved/Not Met  Goal: Respiratory  Outcome: Resolved/Not Met  Goal: Treatments/Interventions/Procedures  Outcome: Resolved/Not Met  Goal: Psychosocial  Outcome: Resolved/Not Met  Goal: *Hemodynamically stable without vasoactive medications  Outcome: Resolved/Not Met  Goal: *Lungs clear or at baseline  Outcome: Resolved/Not Met  Goal: *Adequate oxygenation  Outcome: Resolved/Not Met  Goal: *Optimal pain control at patient's stated goal  Outcome: Resolved/Not Met  Goal: *Demonstrates progressive activity  Outcome: Resolved/Not Met  Goal: *Tolerating diet  Outcome: Resolved/Not Met  Goal: *LVAD parameters within set limits  Outcome: Resolved/Not Met     Problem: Nutrition Deficit  Goal: *Optimize nutritional status  Outcome: Resolved/Not Met

## 2023-03-25 NOTE — PROGRESS NOTES
Palliative Medicine Consult  Zeferino: [phone] ([phone])    Patient Name: Tobias Campos  YOB: 1950    Date of Initial Consult: 1/31/2023  Reason for Consult: [address] Discussion  Requesting Provider: Dr. Kristin Chauhan  Primary Care Physician: Marguerite Cook MD     SUMMARY:   Tobias Campos is a 71 y.o. who was admitted on 1/26/2023 from home with a diagnosis of Acute on chronic CHF, SVT. Mr. Jayne Parker presented to Er w/ cc of elevated heart rate and persistent difficulty urinating since donnelly catheter removal. . Wife also reported since discharge, he has had orthopnea, persistent tachycardia in 120s, low BP and bilateral lower extremity edema. Mr. Janye Parker was discharged 7 days ago from a prolonged and complicated [id number],50/[id number]/4843,  2/2 decompensated heart failure, TANNER//bilat outlet obstruction resulting in bilat hydronephroses and urinary retention as well as hypoxic respiratory failure 2/2 suspected PNA., Once  stabilzed he was discharged home on IV milrinone and Life Vest as bridge to LVAD. Prior to this he was hospitalized 12/11-12/16/2022 for CHF exacerbation and before that he was hospitalized  12/5-12/8/22 for NSTEMI and cath. PMH: Cardiomyopathy w/ EF 25-30% range, on home IV Milrinone and Life Vest as bridge to LVAD, CHF, CAD, s/p CABG, NSTEMI x2 stents, deafness (+hearing aide, hears best in left ear), PAD, HTN, HLD, DM 2, CKD. Current medical issues leading to Palliative Medicine involvement include: LVAD candidacy work up. PALLIATIVE DIAGNOSES:   Palliative care encounter  End-stage heart failure  Hepatic failure  Renal Failure  CVA  Hypoxia  Pancreatitis  Goals of care discussion         PLAN:   Prior to visit completed chart review for updated information. Advanced Heart Failure team, intensivist, unit nurse Mimi Veliz and I met with Mrs. Kendall , son Ibeth Keen and daughter Cristo Teague complicated post operative course with many setbacks along the way and now 5+ weeks after LVAD, team concerned he will not have the improvement they had hoped for. Family has a very good understanding of what Mr. Teague [facility] are , prior to surgery he talked with his family about what he identified as being important to living well. .     Family stated that if chance for an acceptable level of recovery is unlikely, then Mr. Kendall would not want to continue treatment, he would want to be allowed to pass peacefully. GOC- Family electing to turn off LVAD this weekend after family and friends have had an opportunity to gather at his bedside. Dr. Donn Mendoza here over weekend and will be managing comfort orders. Please contact me via [employer] with any urgent needs questions or concerns. Communicated plan of care with: Palliative IDT, [facility],   Adv.  Heart Failure , Saima Monique RN, intensivist, Ethics     GOALS OF CARE / TREATMENT PREFERENCES:     GOALS OF CARE:   Patient/Health Care Proxy Stated Goals: Comfort    TREATMENT PREFERENCES:   Code Status: Full Code    Patient and family's personal goals include: Undecided at this time    Important upcoming milestones or family events: did not address    The patient identifies the following as important for living well:       Advance Care Planning:  [x] The [facility] Interdisciplinary Team has updated the ACP Navigator with Health Care Decision Maker and Patient Capacity      Primary Decision Maker: Susanna Kendall - Spouse - [phone]    Secondary Decision Maker: Omid Clifton - Daughter - [phone]    Secondary Decision Maker: Melo Allred - [phone]  Advance Care Planning 2/9/2023   Patient's Healthcare Decision Maker is: Named in scanned ACP document   Primary Decision Maker Name -   Primary Decision [facility] Phone Number -   Primary Decision Maker Relationship to Patient -   Confirm Advance Directive Yes, on file   Patient Would Like to Complete Advance Directive Yes   Does the patient have other document types -       Medical Interventions: Comfort measures       Other:    As far as possible, the palliative care team has discussed with patient / health care proxy about goals of care / treatment preferences for patient. HISTORY:     History obtained from: medical record/family/nursing    CHIEF COMPLAINT:  not applicable    HPI/SUBJECTIVE:    The patient is:   [] Verbal and participatory  [x] Non-participatory due to: Able to answer some yes/no questions nodding head, lifts right hand to wave unable to provide ros    Clinical Pain Assessment (nonverbal scale for severity on nonverbal patients):   Clinical Pain Assessment  Severity: 0     Activity (Movement): Lying quietly, normal position    Duration: for how long has pt been experiencing pain (e.g., 2 days, 1 month, years)  Frequency: how often pain is an issue (e.g., several times per day, once every few days, constant)     FUNCTIONAL ASSESSMENT:     Palliative Performance Scale (PPS): 30       PSYCHOSOCIAL/SPIRITUAL SCREENING:     Palliative IDT has assessed this patient for cultural preferences / practices and a referral made as appropriate to needs (Cultural Services, Patient Advocacy, Ethics, etc.)    Any spiritual / Faith concerns:  [] Yes /  [x] No   If \"Yes\" to discuss with pastoral care during IDT     Does caregiver feel burdened by caring for their loved one:   [] Yes /  [x] No /  [] No Caregiver Present/Available [] No Caregiver [] Pt Lives at Facility  If \"Yes\" to discuss with social work during IDT     Anticipatory grief assessment:   [x] Normal  / [] Maladaptive     If \"Maladaptive\" to discuss with social work during IDT    ESAS Anxiety: Anxiety: 0    ESAS Depression: Depression: 0        REVIEW OF SYSTEMS:     Positive and pertinent negative findings in ROS are noted above in HPI. The following systems were [] reviewed / [x] unable to be reviewed as noted in HPI  Other findings are noted below.   Systems: constitutional, ears/nose/mouth/throat, respiratory, gastrointestinal, genitourinary, musculoskeletal, integumentary, neurologic, psychiatric, endocrine. Positive findings noted below. Modified ESAS Completed by: provider   Fatigue: 10 Drowsiness: 8   Depression: 0 Pain: 0   Anxiety: 0 Nausea: 0   Anorexia: 10 Dyspnea: 0           Stool Occurrence(s): 1        PHYSICAL EXAM:     From RN flowsheet:  Wt Readings from Last 3 Encounters:   03/24/23 136 lb 0.4 oz (61.7 kg)   01/25/23 123 lb (55.8 kg)   01/23/23 121 lb (54.9 kg)     Blood pressure (!) 141/80, pulse 81, temperature 99 °F (37.2 °C), resp. rate 19, height 5' 6\" (1.676 m), weight 136 lb 0.4 oz (61.7 kg), SpO2 100 %.     Pain Scale 1: Adult Nonverbal Pain Scale  Pain Intensity 1: 0  Pain Onset 1: .  Pain Location 1: Leg  Pain Orientation 1: Right  Pain Description 1: Other (comment) (Unable to verbalize)  Pain Intervention(s) 1: Medication (see MAR)  Last bowel movement, if known:     Constitutional: Ill-appearing, frail, weak, NAD  Eyes: pupils equal, bilateral icterus  ENMT: no nasal discharge, dry mucous membranes  Cardiovascular: LVAD sound  Respiratory: breathing not labored, symmetric, ventilator to trach  Gastrointestinal: Distended and firm  Musculoskeletal: no deformity, no tenderness to palpation  Skin: warm, dry  Neurologic: drowsy, nodding head yes/no to some questions intermittently, movement in his upper right extremities, able to briefly lift and extend right arm to wave/  Psychiatric: Unable to assess  Other:       HISTORY:     Active Problems:    CHF (congestive heart failure) ([facility] [address] (12/12/2022)      LVAD (left ventricular assist device) present ([facility] [address] (3/20/2023)    Past Medical History:   Diagnosis Date    CAD (coronary artery disease) 11/10/2016    NSTEMI & 2 stents    Deafness 10/28/2012    DM (diabetes mellitus) (HCC)     Elevated cholesterol     Hypertension     NSTEMI (non-ST elevated myocardial infarction) ([facility] [address] 11/10/2016      Past Surgical History: Procedure Laterality Date    COLONOSCOPY N/A 6/28/2018    COLONOSCOPY performed by Yazmin Garcia MD at [facility] ENDOSCOPY    COLONOSCOPY N/A 1/18/2023    COLONOSCOPY performed by Iraj Sevilla MD at [facility] ENDOSCOPY    [address]  11/11/2016    2 stents      Family History   Problem Relation Age of Onset    Heart Disease Father     Heart Attack Father     Hypertension Mother     Elevated Lipids Brother     Elevated Lipids Brother     No Known Problems Sister     Elevated Lipids Brother     No Known Problems Son     No Known Problems Daughter     Anesth Problems Neg Hx       History reviewed, no pertinent family history. Social History     Tobacco Use    Smoking status: Never     Passive exposure: Never    Smokeless tobacco: Never   Substance Use Topics    Alcohol use:  Yes     Alcohol/week: 2.0 standard drinks     Types: 1 Cans of beer, 1 Shots of liquor per week     Comment: rarely     Allergies   Allergen Reactions    Ambien [Zolpidem] Other (comments)     Causes extreme confusion      Current Facility-Administered Medications   Medication Dose Route Frequency    bicarbonate dialysis (PRISMASOL) BG K 2/Ca 3.5 5000 ml solution   Extracorporeal DIALYSIS CONTINUOUS    lidocaine 4 % patch 2 Patch  2 Patch TransDERmal Q24H    dextrose 10 % infusion  30 mL/hr IntraVENous CONTINUOUS    ketorolac (TORADOL) injection 15 mg  15 mg IntraVENous Q6H PRN    traZODone (DESYREL) tablet 25 mg  25 mg Oral QHS    alteplase (CATHFLO) 1 mg in sterile water (preservative free) 1 mL injection  1 mg InterCATHeter PRN    L.acidophilus-paracasei-S.thermophil-bifidobacter (RISAQUAD) 8 billion cell capsule  1 Capsule Nasogastric DAILY    insulin lispro (HUMALOG) injection   SubCUTAneous Q12H    [Held by provider] levETIRAcetam (KEPPRA) injection 500 mg  500 mg IntraVENous Q12H    meropenem (MERREM) 1 g in 0.9% sodium chloride (MBP/ADV) 50 mL MBP  1 g IntraVENous Q12H    albumin human 25% (BUMINATE) solution 12.5 g  12.5 g IntraVENous Q12H    oxyCODONE IR (ROXICODONE) tablet 5 mg  5 mg Oral Q4H PRN    oxyCODONE IR (ROXICODONE) tablet 10 mg  10 mg Oral Q4H PRN    pantoprazole (PROTONIX) 40 mg in 0.9% sodium chloride 10 mL injection  40 mg IntraVENous DAILY    labetaloL (NORMODYNE;TRANDATE) injection 5 mg  5 mg IntraVENous Q4H PRN    0.9% sodium chloride infusion  6 mL/hr IntraVENous CONTINUOUS    albumin human 25% (BUMINATE) solution 25 g  25 g IntraVENous DIALYSIS PRN    hydrALAZINE (APRESOLINE) 20 mg/mL injection 5 mg  5 mg IntraVENous Q6H PRN    hydrALAZINE (APRESOLINE) tablet 5 mg  5 mg Oral PRN    NOREPINephrine (LEVOPHED) 8 mg in 5% dextrose 250mL (32 mcg/mL) infusion  0.5-16 mcg/min IntraVENous TITRATE    [Held by provider] colchicine tablet 0.6 mg  0.6 mg Oral DAILY    heparin (porcine) 1,000 unit/mL injection 1,700 Units  1,700 Units InterCATHeter DIALYSIS PRN    And    heparin (porcine) 1,000 unit/mL injection 1,500 Units  1,500 Units InterCATHeter DIALYSIS PRN    balsam peru-castor oiL (VENELEX) ointment   Topical BID    [Held by provider] acetaminophen (TYLENOL) solution 650 mg  650 mg Oral Q4H PRN    [Held by provider] acetaminophen (TYLENOL) suppository 650 mg  650 mg Rectal Q4H PRN    HYDROmorphone (DILAUDID) injection 0.5 mg  0.5 mg IntraVENous Q3H PRN    HYDROmorphone (DILAUDID) injection 1 mg  1 mg IntraVENous Q4H PRN    glucose chewable tablet 16 g  4 Tablet Oral PRN    glucagon (GLUCAGEN) injection 1 mg  1 mg IntraMUSCular PRN    sodium chloride (NS) flush 5-40 mL  5-40 mL IntraVENous Q8H    sodium chloride (NS) flush 5-40 mL  5-40 mL IntraVENous PRN    naloxone (NARCAN) injection 0.4 mg  0.4 mg IntraVENous PRN    dextrose 10 % infusion 0-250 mL  0-250 mL IntraVENous PRN    [Held by provider] acetaminophen (TYLENOL) tablet 650 mg  650 mg Oral Q6H PRN    ondansetron (ZOFRAN) injection 4 mg  4 mg IntraVENous Q4H PRN    albuterol-ipratropium (DUO-NEB) 2.5 MG-0.5 MG/3 ML  3 mL Nebulization Q6H PRN    bisacodyL (DULCOLAX) suppository 10 mg  10 mg Rectal DAILY PRN          LAB AND IMAGING FINDINGS:     Lab Results   Component Value Date/Time    WBC 9.9 03/24/2023 04:00 AM    HGB 8.1 (L) 03/24/2023 04:00 AM    PLATELET 234 12/45/9512 04:00 AM     Lab Results   Component Value Date/Time    Sodium 134 (L) 03/24/2023 04:00 AM    Potassium 5.0 03/24/2023 04:00 AM    Chloride 104 03/24/2023 04:00 AM    CO2 25 03/24/2023 04:00 AM    BUN 29 (H) 03/24/2023 04:00 AM    Creatinine 1.46 (H) 03/24/2023 04:00 AM    Calcium 11.0 (H) 03/24/2023 04:00 AM    Magnesium 3.3 (H) 03/24/2023 04:00 AM    Phosphorus 3.3 03/24/2023 04:00 AM      Lab Results   Component Value Date/Time    Alk. phosphatase 361 (H) 03/24/2023 04:00 AM    Protein, total 6.2 (L) 03/24/2023 04:00 AM    Albumin 3.3 (L) 03/24/2023 04:00 AM    Globulin 2.9 03/24/2023 04:00 AM     (H) 03/10/2023 07:37 AM     Lab Results   Component Value Date/Time    INR 1.4 (H) 03/24/2023 04:00 AM    Prothrombin time 13.9 (H) 03/24/2023 04:00 AM    aPTT 45.2 (H) 03/09/2023 06:26 PM      Lab Results   Component Value Date/Time    Iron 37 03/05/2023 05:09 AM    TIBC 152 (L) 03/05/2023 05:09 AM    Iron % saturation 24 03/05/2023 05:09 AM    Ferritin 595 (H) 03/05/2023 05:09 AM      No results found for: PH, PCO2, PO2  No components found for: [place]   Lab Results   Component Value Date/Time    CK 76 03/05/2023 05:09 AM    CK - MB 5.3 (H) 11/10/2016 03:44 PM                Total time:35 min  .

## 2023-03-25 NOTE — PROGRESS NOTES
Referral source:   Tobias Campos at [facility] in [facility] [place] 4 CV INTNS CARE. I reviewed the medical record prior to this encounter. Spiritual Assessment: At this time Mr. Albino Horn brothers were at bedside.  waved to . I checked in with everyone in the room. The brothers shared family made decision to turn off LVAD/transition to comfort 1pm Saturday. According to family Erma Peters will be present/offering support. I cultivated a relationship of support through expression of care; explored spiritual beliefs; emotional concerns. I initiated family-led spiritual. I followed up with bedside nursing staff. Outcome: Tobias Campos and family expressed appreciation for pastoral support and the opportunity to share their thoughts and feelings. Plan of Care: Patient and family  is aware of  availability and was encouraged to request   support as needed. Advised nurse to contact [facility] for any further referrals. The  on-call can be reached at [phone]). Rev.  Farhana Brooks MDiv, MAPT  Staff

## 2023-03-25 NOTE — PROGRESS NOTES
0745: Bedside and Verbal shift change report received from [facility] YANNI SCHRADER RN to [employer]. Report included the following information SBAR, Kardex, Intake/Output, MAR, Recent Results, Med Rec Status, Cardiac Rhythm NSR, and Alarm Parameters . Pt alert, sitting up in bed- mouthing words to this RN- follows all commands at this time. Pt denies pain. Plan for withdrawal of care 1300.     1100: [facility] notified. 1300: Family at bedside. This RN, Nimco Gar MD, Devonte Zimmerman NP, and Jocelyn Cabral MD at bedside. LVAD turned off and disconnected. 1330: MD at bedside to pronounce. 1345: Lif[facility]et coordinator notified with time of death.

## 2023-03-25 NOTE — PROGRESS NOTES
Referral source:    responded to page of time of death. Jem Brain at [address] in [facility] 4 CV INTNS CARE. I reviewed the medical record prior to this encounter. Spiritual Assessment:     Family was at bedside. No needs at this time.  touched base with bedside nurse. Outcome: Jem Dillard  family expressed appreciation for pastoral support and the opportunity to share their thoughts and feelings. Plan of Care: Family  is aware of  availability and was encouraged to request   support as needed. Advised nurse to contact [facility] for any further referrals. The  on-call can be reached at [phone]). Rev.  Dixie Toussaint MDiv, MAPT  Staff

## 2023-03-25 NOTE — PROGRESS NOTES
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 Alice Vargas, Dr. Noemi Horne, bedside RN, palliative care and SW with plans to observe SBT, persistent liver failure and pancreatitis; if no improvement or worsening over the next week then will discuss timing of withdrawal of support; if catastrophic even between now and next week, family wishes to discontinue LVAD support  Family meeting yesterday. Included ethics. Family has decided to stop VAD and pursue comfort. Scheduled for later today. RECOMMENDATIONS:  Continue LVAD speed at 4700rpm  Use hydralazine 5mg IV q4h prn MAP>85 or SBP > 110 mmHg  No beta-blockers due to hypotension and RV condition prior to LVAD  Cannot tolerate ARB/ARNi due to hypotension and renal function  Cannot tolerate spironolactone due to hyperkalemia and renal function  Cannot tolerate jardiance due to declined renal function  Previously discontinued corlanor due to SVT  Discontinued amio due to intermitted heart blocks under pacemaker  Continue CRRT to keep consistently goal CVP 8-10  Keep K+ >4 and Mg >2  ID recommendations appreciated - pan culture NGTD. Wound culture sent  No colchicine per nephrology  Antibiotics per intensivist  Continue to hold coumadin  AC on hold for now due to slightly progressive SAH on head CT. Previously D/w intensivist and family, likely more harm from resuming than continuing without. Hepatology recommendations appreciated   Cannot tolerate statins due to abnormal LFT  Management of tube feeds in light of pancreatitis per intensivist.    Santhosh Trimble placed on hold for possible contribution to pancreatitis   Probiotic for diarrhea   Daily dressing changes to Drive line exit site  Pulmonary hygiene- continue SBT     Plan to turn of LVAD this afternoon when family present. Dr. Caroline Cox to assist with sedation. Discussed with bedside RN and Dr. Caroline Cox. INTERVAL HISTORY:  MAPs 80s-90s, HR 80s. Back on levophed  CVP not currently accurate   I/O  even; no weight   -no new labs today due to transition to [address] [place] [address] today  -Mr. Kednall is awake and interactive today. He waves and follows commands. He denies pain at this time. No SOB or other complaints.         IMPRESSION:  Acute on chronic combined systolic/diastolic  heart failure  Stage D, NYHA class IV symptoms   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, LVEF 20% (by echo 1/2023) and 23% (by cMRI 1/3/23)  Cardiac MRI suggestive of ischemic cardiomyopathy  PYP equivocal  S/p HeartMate 3  LVAD-DT (2/15/23)  C/b RV failure requiring Impella RP placed 2/18/23 and discontinued 3/1/23  C/b acute on chronic renal failure, requiring CRRT  C/b hepatic failure, TB 12 (peak 15.3)  C/b lactic acidosis, persistent  C/b persistent septic shock c/b vasodilation and high outputs, on multiple antibiotics, procalcitonin persistently elevated  C/b R SUPA occlusion with small area of matched perfusion defect - subacute infarct c/b left sided hemiparesis  C/b pancreatitis  C/b failure to extubate x 2; trached  Coronary artery disease  CAD s/p CABG x 2: further disease best managed medically due to small vessel size   At risk of sudden cardiac death  Peripheral arterial disease  Bilateral hydronephrosis s/p donnelly  Cardiac risk factors:  HTN  HL  TRISTAN, STOP-BANG 4  DM2  CKD, stage 3  MOCA from 2/9 19/30, consistent with mild cognitive impairment  Hard of hearing  Gastric Neuroendocrine Tumor, elevated gastrin and chromogranin A  Septic shock, improving   Left sided weakness noted night 3/1. +SAH and subacute occlusion distal R cerebral artery   Poorly healing wounds:  sacral deep tissue; left heel deep tissue; driveline site   MELD Na score of 35; 52.6% estimated 3 month mortality         LIFE GOALS: not re-addressed today   Patient's personal goals included: having a few more years with family  Important upcoming milestones or family events: None at this time   The patient identified the following as important for living well: being at home, not being SOB            CXR (3/5/23) mild pulmonary edema. Small pleural effusions and bibasilar airspace opacities. Head CTA (3/2/23) Occlusion distal right anterior cerebral artery, with associated small area of matched perfusion defect and cortical enhancement, consistent with subacute infarct.  Diminutive and irregular right vertebral artery, occluded at the V3-4 junction, age indeterminate extensive multifocal atherosclerosis in the intracranial and extracranial circulation. CARDIAC IMAGING:     Echo (3/13/23)    Left Ventricle: Severely reduced left ventricular systolic function with a visually estimated EF of 25 - 30%. Left ventricle is dilated. Normal wall thickness. Unable to assess wall motion. Right Ventricle: Moderately reduced systolic function. TAPSE is abnormal. TAPSE is 1.1 cm. LVEDD 5.3cm     ECHO- 3/6/23       Left Ventricle: Severely reduced left ventricular systolic function with a visually estimated EF of 15 - 20%. Left ventricle is moderately dilated. LVAD is present. Right Ventricle: Right ventricle is moderately dilated. Mildly reduced systolic function. Echo 2/19/23    Left Ventricle: Severely reduced left ventricular systolic function with a visually estimated EF of 20 - 25%. Not well visualized. Left ventricle size is normal. Increased wall thickness. Unable to assess wall motion. Abnormal diastolic function. LVAD is present. LVAD inflow cannula was not well visualized. Right Ventricle: Not well visualized. Right ventricle is mildly dilated. Moderately reduced systolic function. TAPSE is abnormal.    Mitral Valve: Severe annular calcification at the anterior and posterior leaflets of the mitral valve. Mild regurgitation. Left Atrium: Left atrium is dilated. Right Atrium: Right atrium is dilated. Technical qualifiers: Echo study was technically difficult and technically difficult with poor endocardial visualization. Echo 1/27/23    Left Ventricle: EF by visual approximation is 20%. Left ventricle is mildly dilated. Mitral Valve: Moderately thickened leaflet, at the anterior and posterior leaflets. Moderately calcified leaflet. Moderate regurgitation. Left Atrium: Left atrium is moderately dilated. Technical qualifiers: Echo study was technically difficult with poor endocardial visualization. Contrast used: Definity. Limited study, not all structures viewed     Echo 1/9/23   Left Ventricle: Severely reduced left ventricular systolic function with a visually estimated EF of 25 - 30%. Left ventricle size is normal. Mildly increased wall thickness. Echo 12/26/22    Left Ventricle: Severely reduced left ventricular systolic function with a visually estimated EF of 25 - 30%. Left ventricle size is normal. Normal wall thickness. There are regional wall motion abnormalities. Grade II diastolic dysfunction with increased LAP. Right Ventricle: Moderately reduced systolic function. TAPSE is abnormal. TAPSE is 1.1 cm. Aortic Valve: Mild stenosis of the aortic valve. AV peak gradient is 13 mmHg. AV peak velocity is 1.8 m/s. Mitral Valve: Not well visualized. Moderate annular calcification at the posterior leaflet of the mitral valve. Mild to moderate regurgitation. Tricuspid Valve: Mildly elevated RVSP. Left Atrium: Left atrium is moderately dilated. 12/8/22    Left Ventricle: Moderately reduced left ventricular systolic function with a visually estimated EF of 35 - 40%. Severe hypokinesis of the following segments: mid anteroseptal, apical anterior, apical septal, apical inferior and apical lateral. Severe hypokinesis of the apex. Mitral Valve: Severely thickened leaflet, at the anterior and posterior leaflets. Severely calcified leaflet, at the anterior and posterior leaflets. Mild annular calcification of the mitral valve. Moderate regurgitation. Left Atrium: Left atrium is mildly dilated. Contrast used: Definity. limited study     EKG 12/22/22 ST, Biatria enlargement, marked ST abnormality     [facility] 12/6/22  1. Normal LVEDP  2. Severe native multivessel coronary artery disease  3. Patent LIMA to LAD and vein graft to distal RCA  4. Recurrent ISR in OM1 stent with now 60 to 70% restenosis  5. Recoil of left main and circumflex stent with now recurrent 40 to 50% stenosis.   6.  Progression of ostial left main disease now to about 60% stenosis  7. Progression of disease in jailed first marginal branch now with diffuse 90% stenosis  8. High-grade stenosis in the mid to distal right potential femoral artery treated with 6 x 40 mm impact drug-coated balloon angioplasty to reduce the stenosis to less than 40%        HEMODYNAMICS:  RHC 1/9/23  PA 20/9, RA 3, PCWP 8, CI 1.8     CPEST too ill   6MW 300 feet previously      LVAD INTERROGATION:  Device interrogated in person  Device function normal, normal flow, no events  LVAD   Pump Speed (RPM): 4700  Pump Flow (LPM): 3.2  MAP: 77  PI (Pulsitility Index): 6  Power: 3   Test: Yes  Back Up  at Bedside & Labeled: Yes  Power Module Test: Yes  Driveline Site Care: No  Driveline Dressing: Clean, Dry, and Intact  Testing  Alarms Reviewed: Yes  Back up SC speed: 4700  Back up Low Speed Limit: 4300  Emergency Equipment with Patient?: Yes  Emergency procedures reviewed?: Yes  Drive line site inspected?: No (covered by dressing)  Drive line intergrity inspected?: Yes  Drive line dressing changed?: No    PHYSICAL EXAM:  Visit Vitals  BP (!) 93/57   Pulse 71   Temp 98.1 °F (36.7 °C)   Resp 20   Ht 5' 6\" (1.676 m)   Wt 136 lb 0.4 oz (61.7 kg)   SpO2 100%   BMI 21.95 kg/m²     REVIEW OF SYSTEMS:  Review of Systems   Unable to perform ROS: Acuity of condition. Able to nod and tries to talk some. Denies pain.        Physical Assessment:   General Appearance: awake, ill appearing adult male resting in bed in NAD; appears stated age  Eyes: sclera icteric  Mouth/Throat: moist mucous membranes; oral pharynx clear  Neck: supple; trach  Pulmonary:  clear/dim to auscultation bilaterally; trach/vent  Cardiovascular: regular rate and rhythm; VAD hum;  slight murmur  Abdomen: soft, distended; hyper active bowel sounds normal  Musculoskeletal: no swelling or deformity; weak on L  Extremities: no edema; non palpable distal pulses   Skin: warm and dry;  drive line dressing with scant tan drainage;  jaundiced  Neuro: opens eyes; follows some commands;  PARKINSON  Psych: unable to assess         PAST MEDICAL HISTORY:  Past Medical History:   Diagnosis Date    CAD (coronary artery disease) 11/10/2016    NSTEMI & 2 stents    Deafness 10/28/2012    DM (diabetes mellitus) ([facility] [address]     Elevated cholesterol     Hypertension     NSTEMI (non-ST elevated myocardial infarction) ([facility] [address] 11/10/2016       PAST SURGICAL HISTORY:  Past Surgical History:   Procedure Laterality Date    COLONOSCOPY N/A 6/28/2018    COLONOSCOPY performed by Flakita Tubbs MD at [facility] ENDOSCOPY    COLONOSCOPY N/A 1/18/2023    COLONOSCOPY performed by Hermes Carreon MD at [address] [place] [address]  11/11/2016    2 stents       FAMILY HISTORY:  Family History   Problem Relation Age of Onset    Heart Disease Father     Heart Attack Father     Hypertension Mother     Elevated Lipids Brother     Elevated Lipids Brother     No Known Problems Sister     Elevated Lipids Brother     No Known Problems Son     No Known Problems Daughter     Anesth Problems Neg Hx        SOCIAL HISTORY:  Social History     Socioeconomic History    Marital status:    Tobacco Use    Smoking status: Never     Passive exposure: Never    Smokeless tobacco: Never   Vaping Use    Vaping Use: Never used   Substance and Sexual Activity    Alcohol use:  Yes     Alcohol/week: 2.0 standard drinks     Types: 1 Cans of beer, 1 Shots of liquor per week     Comment: rarely    Drug use: No    Sexual activity: Yes     Social Determinants of Health     Financial Resource Strain: Medium Risk    Difficulty of Paying Living Expenses: Somewhat hard   Food Insecurity: Food Insecurity Present    Worried About Running Out of Food in the Last Year: Never true    Ran Out of Food in the Last Year: Often true       LABORATORY RESULTS:     Labs Latest Ref Rng & Units 3/24/2023 3/23/2023 3/22/2023 3/21/2023 3/20/2023 3/19/2023 3/18/2023   WBC 4.1 - 11.1 K/uL 9.9 10.1 9.6 9.4 7.7 6.6 -   RBC 4.10 - 5.70 M/uL 2.58(L) 2.60(L) 2.55(L) 2.59(L) 2.54(L) 2.14(L) -   Hemoglobin 12.1 - 17.0 g/dL 8. 1(L) 8.2(L) 7. 9(L) 8.0(L) 8.2(L) 7. 0(L) -   Hematocrit 36.6 - 50.3 % 26. 3(L) 26. 0(L) 25. 9(L) 26. 1(L) 24. 6(L) 21. 9(L) -   MCV 80.0 - 99.0 . 9(H) 100. 0(H) 101. 6(H) 100. 8(H) 96.9 102. 3(H) -   Platelets 968 - 815 K/uL 185 198 177 201 205 222 -   Lymphocytes 12 - 49 % 13 10(L) 9(L) 9(L) 9(L) 10(L) -   Monocytes 5 - 13 % 11 9 10 10 8 14(H) -   Eosinophils 0 - 7 % 4 4 3 3 4 3 -   Basophils 0 - 1 % 1 1 0 1 0 1 -   Albumin 3.5 - 5.0 g/dL 3. 3(L) 3.4(L) 3.4(L) 3.4(L) 3. 3(L) 3.4(L) 3.5   Calcium 8.5 - 10.1 MG/DL 11. 0(H) 10. 9(H) 10. 3(H) 9.7 9.7 10.0 10. 5(H)   Glucose 65 - 100 mg/dL 115(H) 134(H) 130(H) 120(H) 135(H) 140(H) 145(H)   BUN 6 - 20 MG/DL 29(H) 30(H) 31(H) 26(H) 28(H) 26(H) 29(H)   Creatinine 0.70 - 1.30 MG/DL 1.46(H) 1.40(H) 1.45(H) 1.50(H) 1.63(H) 1.74(H) 1.82(H)   Sodium 136 - 145 mmol/L 134(L) 134(L) 135(L) 136 134(L) 138 136   Potassium 3.5 - 5.1 mmol/L 5.0 4.8 4.5 4.5 4.0 4.3 4.4   TSH 0.36 - 3.74 uIU/mL - - - - - - -   LDH 85 - 241 U/L 254(H) 267(H) 264(H) 263(H) 274(H) 240 -   Some recent data might be hidden     Lab Results   Component Value Date/Time    TSH 1.25 03/17/2023 02:10 AM    TSH 3.52 01/28/2023 05:26 AM    TSH 2.12 12/27/2022 02:36 PM    TSH 4.80 (H) 12/06/2022 03:53 AM    TSH 5.39 (H) 10/12/2022 09:10 AM    TSH 3.53 02/03/2022 11:47 AM    TSH 5.790 (H) 11/21/2019 04:45 PM    TSH 3.08 06/22/2018 01:53 PM    TSH 4.250 05/26/2015 09:43 AM       ALLERGY:  Allergies   Allergen Reactions    Ambien [Zolpidem] Other (comments)     Causes extreme confusion        CURRENT MEDICATIONS:    Current Facility-Administered Medications:     bicarbonate dialysis (PRISMASOL) BG K 2/Ca 3.5 5000 ml solution, , Extracorporeal, DIALYSIS CONTINUOUS, Fadi Schultz MD, Last Rate: 1,600 mL/hr at 03/25/23 0833, New Bag at 03/25/23 0833    lidocaine 4 % patch 2 Patch, 2 Patch, TransDERmal, Q24H, Adriana Broussard MD, 2 Patch at 03/25/23 1019    dextrose 10 % infusion, 30 mL/hr, IntraVENous, CONTINUOUS, April Shelling, DO, Last Rate: 30 mL/hr at 03/25/23 0827, 30 mL/hr at 03/25/23 0827    ketorolac (TORADOL) injection 15 mg, 15 mg, IntraVENous, Q6H PRN, April Shelling, DO, 15 mg at 03/25/23 1019    alteplase (CATHFLO) 1 mg in sterile water (preservative free) 1 mL injection, 1 mg, InterCATHeter, PRN, Eleonora Johnson MD    insulin lispro (HUMALOG) injection, , SubCUTAneous, Q12H, Justin Wall MD    [Held by provider] levETIRAcetam (KEPPRA) injection 500 mg, 500 mg, IntraVENous, Q12H, Jeromy Cotto MD, 500 mg at 03/23/23 2143    oxyCODONE IR (ROXICODONE) tablet 5 mg, 5 mg, Oral, Q4H PRN, April Shelling, DO, 5 mg at 03/24/23 2370    oxyCODONE IR (ROXICODONE) tablet 10 mg, 10 mg, Oral, Q4H PRN, April Shelling, DO, 10 mg at 03/24/23 0615    pantoprazole (PROTONIX) 40 mg in 0.9% sodium chloride 10 mL injection, 40 mg, IntraVENous, DAILY, Walker Segal MD, 40 mg at 03/25/23 0826    labetaloL (NORMODYNE;TRANDATE) injection 5 mg, 5 mg, IntraVENous, Q4H PRN, Tammy MOLINA MD, 5 mg at 03/09/23 0307    0.9% sodium chloride infusion, 6 mL/hr, IntraVENous, CONTINUOUS, April Shelling, DO, Stopped at 03/25/23 0500    albumin human 25% (BUMINATE) solution 25 g, 25 g, IntraVENous, DIALYSIS PRN, Yannick Beach MD, 25 g at 03/13/23 0539    hydrALAZINE (APRESOLINE) 20 mg/mL injection 5 mg, 5 mg, IntraVENous, Q6H PRN, Adriana Broussard MD, 5 mg at 03/10/23 1316    hydrALAZINE (APRESOLINE) tablet 5 mg, 5 mg, Oral, PRN, Adriana Broussard MD    NOREPINephrine (LEVOPHED) 8 mg in 5% dextrose 250mL (32 mcg/mL) infusion, 0.5-16 mcg/min, IntraVENous, TITRATE, Eleonora Johnson MD, Last Rate: 3.8 mL/hr at 03/25/23 0708, 2 mcg/min at 03/25/23 0708    [Held by provider] colchicine tablet 0.6 mg, 0.6 mg, Oral, DAILY, Lizette Linton NP, 0.6 mg at 03/14/23 0848    heparin (porcine) 1,000 unit/mL injection 1,700 Units, 1,700 Units, InterCATHeter, DIALYSIS PRN, 1,700 Units at 03/13/23 0819 **AND** heparin (porcine) 1,000 unit/mL injection 1,500 Units, 1,500 Units, InterCATHeter, DIALYSIS PRN, Tarun Rodriguez DO, 1,500 Units at 03/13/23 0819    balsam peru-castor oiL (VENELEX) ointment, , Topical, BID, Tasha Mensah MD, Given at 03/25/23 8132    [Held by provider] acetaminophen (TYLENOL) solution 650 mg, 650 mg, Oral, Q4H PRN, Pablo Junior MD, 650 mg at 03/14/23 1924    [Held by provider] acetaminophen (TYLENOL) suppository 650 mg, 650 mg, Rectal, Q4H PRN, Pablo Junior MD, 650 mg at 02/17/23 2013    HYDROmorphone (DILAUDID) injection 0.5 mg, 0.5 mg, IntraVENous, Q3H PRN, Luz Maria MOLINA MD, 0.5 mg at 03/24/23 2232    HYDROmorphone (DILAUDID) injection 1 mg, 1 mg, IntraVENous, Q4H PRN, Luz Maria MOLINA MD, 1 mg at 03/25/23 0429    glucose chewable tablet 16 g, 4 Tablet, Oral, PRN, Shaila, Lizette B, NP    glucagon (GLUCAGEN) injection 1 mg, 1 mg, IntraMUSCular, PRN, Shaila, Lizette B, NP    sodium chloride (NS) flush 5-40 mL, 5-40 mL, IntraVENous, Q8H, Shaila, Lizette B, NP, 10 mL at 03/25/23 0613    sodium chloride (NS) flush 5-40 mL, 5-40 mL, IntraVENous, PRN, Shaila, Lizette B, NP, 40 mL at 02/17/23 1818    naloxone (NARCAN) injection 0.4 mg, 0.4 mg, IntraVENous, PRN, Shaila, Lizette B, NP    dextrose 10 % infusion 0-250 mL, 0-250 mL, IntraVENous, PRN, Shaila, Lizette B, NP, Last Rate: 150 mL/hr at 02/24/23 0220, 75 mL at 02/24/23 0220    [Held by provider] acetaminophen (TYLENOL) tablet 650 mg, 650 mg, Oral, Q6H PRN, Shaila, Lizette B, NP, 650 mg at 03/13/23 2114    ondansetron (ZOFRAN) injection 4 mg, 4 mg, IntraVENous, Q4H PRN, Shaila Lizette B, NP, 4 mg at 03/09/23 1212    albuterol-ipratropium (DUO-NEB) 2.5 MG-0.5 MG/3 ML, 3 mL, Nebulization, Q6H PRN, Shaila, Lizette B, NP    bisacodyL (DULCOLAX) suppository 10 mg, 10 mg, Rectal, DAILY PRN, Agnes STALEY NP, 10 mg at 02/22/23 8463    PATIENT CARE TEAM:  Patient Care Team:  May Bertrand MD as PCP - General (Family Medicine)  May Bertrand MD as PCP - [facility]  Israel Jean MD (Cardiovascular Disease Physician)  Dorothy Pope MD (Gastroenterology)  Jayden Chauhan MD (Cardiothoracic Surgery)  Aspen Mosley MD (Cardiovascular Disease Physician)  Iva Bañuelos MD (Nephrology)  Ting Carter MD (Pulmonary Disease)  Reuben Bethea MD ([address] [facility] Vascular Surgery)     Thank you for allowing me to participate in this patient's care. Tammy Logan NP   [address] [place] [address], [address]  Phone: [phone]      On this date 3/25/2023, I have spent a total time of  30 minutes personally reviewing new vitals, test results, notes, telemetry/EKG, face to face encounter/physical exam of patient, writing orders, performing medical decision making, and documenting. Patient examined today subsequent to the provider visit documented above. Based on my face-to-face encounter with the patient, the following is my assessment and plan/recommendations:  Mr. Irina Odonnell reports no symptoms this morning. He is in good spirits. He denies pain or difficulty with breathing. On exam JVP is 8 cm, lungs are clear anterior, abdomen is non tender but mildly distended, LVAD hum is normal, he has no edema.        LVAD   Pump Speed (RPM): 4700  Pump Flow (LPM): 3.2  MAP: 77  PI (Pulsitility Index): 6  Power: 3   Test: Yes  Back Up  at Bedside & Labeled: Yes  Power Module Test: Yes  Driveline Site Care: No  Driveline Dressing: Clean, Dry, and Intact  Testing  Alarms Reviewed: Yes  Back up SC speed: 4700  Back up Low Speed Limit: 4300  Emergency Equipment with Patient?: Yes  Emergency procedures reviewed?: Yes  Drive line site inspected?: No (covered by dressing)  Drive line intergrity inspected?: Yes  Drive line dressing changed?: No      I interrogated his LVAD today. Parameters are as documented above. There were 3 PI events in the last 24 hours. The LVAD is functioning properly. No programing changes were made. The driveline was examined and there is no erythema, tenderness or drainage from the driveline exit site. Assessment:  Acute on chronic combined systolic  heart failure  S/p HeartMate 3  LVAD-DT (2/15/23)  C/b RV failure requiring Impella RP placed 2/18/23 and discontinued 3/1/23  C/b acute on chronic renal failure, requiring CRRT  C/b hepatic failure, TB 12 (peak 15.3)  C/b lactic acidosis, persistent  C/b persistent septic shock c/b vasodilation and high outputs, on multiple antibiotics, procalcitonin persistently elevated  C/b R SUPA occlusion with small area of matched perfusion defect - subacute infarct c/b left sided hemiparesis  C/b pancreatitis  C/b failure to extubate x 2; trached  Coronary artery disease  CAD s/p CABG x 2: further disease best managed medically due to small vessel size   Peripheral arterial disease  Bilateral hydronephrosis s/p donnelly  HTN  HLD  TRISTAN, STOP-BANG 4  DM2  TANNER on CKD stage 3, on CRRT  Mild cognitive impairment  Hard of hearing  Gastric Neuroendocrine Tumor,  Septic shock, improving   CVA 3/1/23  -SAH and subacute occlusion distal R cerebral artery   Poorly healing wounds:  sacral deep tissue; left heel deep tissue; driveline site     Plan:  Family meeting yesterday and plans for withdrawal of care this afternoon. Will give pain medications, discontinue CRRT and then turn off LVAD and ventilator. Plans discussed with Critical care and bedside nurse.      Alondra Ortiz MD, [facility]  Advanced Heart Failure and Pulmonary Hypertension

## 2023-03-25 NOTE — PROGRESS NOTES
[facility]    ICU TEAM Progress Note    Name: Cale Payne   : 1950   MRN: [id number]   Date: 3/25/2023      I  Subjective:   Progress Note: 3/25/2023      Reason for ICU Admission: Cardiomyopathy status post LVAD implantation     Interval history:  71year old male PMH as noted above admitted to [facility] on  due to ongoing symptoms secondary to his HFrEF. Treated for possible CAP, and diuresed for acute on chronic HFrEF. Nephrology following for TANNER on CKD 3. AHF team recommended transfer to CVICU for [facility] placement and ongoing LVAD workup, with placement 2/15. Pt extubated , however with development of worsening renal dysfunction overnight and unable to tolerate CRRT so was reintubated and taken for Impella RP placement for right-sided support in a.m. of  and CRRT resumed. Impella removed on 3/1. Overnight 3/1-3/1 CVA noted with SAH.  3/2 SAH enlarged. Held anticoagulation. Failed extubation 3/2. Trach placed 3/7. Transitioned to [facility]. Repeat CTH 3/10 w/ persistent SAH, heparin held.           Active Problem List:     Problem List  Date Reviewed: 2023            Codes Class    Dyslipidemia, goal LDL below 70 ICD-10-CM: E78.5  ICD-9-CM: 272.4         LVAD (left ventricular assist device) present ([facility] ICD-10-CM: Z95.811  ICD-9-CM: V43.21         PAD (peripheral artery disease) ([facility] ICD-10-CM: I73.9  ICD-9-CM: 303.2         Systolic CHF, acute on chronic (HCC) ICD-10-CM: I50.23  ICD-9-CM: 428.23, 428.0         Receiving inotropic medication ICD-10-CM: Z79.899  ICD-9-CM: V49.89         CHF (congestive heart failure) (HCC) ICD-10-CM: I50.9  ICD-9-CM: 428.0         CKD stage 3 due to type 2 diabetes mellitus ([facility] ICD-10-CM: E11.22, N18.30  ICD-9-CM: 250.40, 585.3         S/P CABG x 3 ICD-10-CM: Z95.1  ICD-9-CM: V45.81     Overview Signed 2018 11:20 AM by SCAR Concepcion     Coronary Artery Bypass Grafting x 3, LIMA to LAD, RSVG to OM, RSVG to RCA  Right V [facility] Coronary artery disease involving native coronary artery of native heart without angina pectoris ICD-10-CM: I25.10  ICD-9-CM: 414.01         Hypertension, essential ICD-10-CM: I10  ICD-9-CM: 401.9         Colon cancer screening ICD-10-CM: Z12.11  ICD-9-CM: V76.51         Nonrheumatic aortic valve stenosis ICD-10-CM: I35.0  ICD-9-CM: 424.1         Bruit of right carotid artery ICD-10-CM: R09.89  ICD-9-CM: 785. 9         Type 2 diabetes mellitus with hyperglycemia (HCC) ICD-10-CM: E11.65  ICD-9-CM: 250.00         Deafness ICD-10-CM: H91.90  ICD-9-CM: 389.9         Cramp of limb ICD-10-CM: R25.2  ICD-9-CM: 729.82            Past Medical History:      has a past medical history of CAD (coronary artery disease) (11/10/2016), Deafness (10/28/2012), DM (diabetes mellitus) ([facility] Elevated cholesterol, Hypertension, and NSTEMI (non-ST elevated myocardial infarction) ([facility] (11/10/2016). Past Surgical History:      has a past surgical history that includes hx appendectomy; pr unlisted procedure cardiac surgery (11/11/2016); colonoscopy (N/A, 6/28/2018); and colonoscopy (N/A, 1/18/2023). Home Medications:     Prior to Admission medications    Medication Sig Start Date End Date Taking? Authorizing Provider   polyethylene glycol (Miralax) 17 gram/dose powder Take 17 g by mouth daily as needed for Constipation. Yes Provider, Historical   furosemide (LASIX) 20 mg tablet Take 1 Tablet by mouth daily as needed (for weight greater than 120 lb by 2-3 lb or shortness of breath). 1/23/23  Yes ShailaJackeline [place] B, NP   glipiZIDE (GLUCOTROL) 5 mg tablet Take 1 tablet by mouth twice daily 12/2/22  Yes Saul Bateman MD   ipratropium (ATROVENT) 21 mcg (0.03 %) nasal spray 2 Sprays by Both Nostrils route every twelve (12) hours. 11/21/22  Yes Saul Bateman MD   ergocalciferol (ERGOCALCIFEROL) 1,250 mcg (50,000 unit) capsule Take 1 Capsule by mouth every seven (7) days.   Patient taking differently: Take 50,000 Units by mouth every seven (7) days. Mondays 10/14/22  Yes Sally Rodriguez MD   Januvia 50 mg tablet Take 1 tablet by mouth once daily 22  Yes Sally Rodriguez MD   rosuvastatin (CRESTOR) 20 mg tablet Take 1 Tablet by mouth nightly. 22  Yes Marcio Maldonado MD   aspirin delayed-release 81 mg tablet Take 81 mg by mouth daily. Yes Provider, Historical   zolpidem (AMBIEN) 5 mg tablet Take 1 Tablet by mouth nightly as needed for Sleep. Max Daily Amount: 5 mg. 23   Sally Rodriguez MD   nitroglycerin (NITROSTAT) 0.4 mg SL tablet 1 Tablet by SubLINGual route every five (5) minutes as needed for Chest Pain. Up to 3 doses. 22   Hortencia Angel MD       Allergies/Social/Family History: Allergies   Allergen Reactions    Ambien [Zolpidem] Other (comments)     Causes extreme confusion      Social History     Tobacco Use    Smoking status: Never     Passive exposure: Never    Smokeless tobacco: Never   Substance Use Topics    Alcohol use: Yes     Alcohol/week: 2.0 standard drinks     Types: 1 Cans of beer, 1 Shots of liquor per week     Comment: rarely      Family History   Problem Relation Age of Onset    Heart Disease Father     Heart Attack Father     Hypertension Mother     Elevated Lipids Brother     Elevated Lipids Brother     No Known Problems Sister     Elevated Lipids Brother     No Known Problems Son     No Known Problems Daughter     Anesth Problems Neg Hx        Review of Systems:   Not able to obtain due to patient medical condition    Objective:   Vital Signs:  Visit Vitals  BP (!) 107/92   Pulse 77   Temp 98.1 °F (36.7 °C)   Resp 25   Ht 5' 6\" (1.676 m)   Wt 61.7 kg (136 lb 0.4 oz)   SpO2 100%   BMI 21.95 kg/m²    O2 Flow Rate (L/min): 20 l/min O2 Device: Tracheostomy, Ventilator Temp (24hrs), Av.3 °F (36.8 °C), Min:98 °F (36.7 °C), Max:99 °F (37.2 °C)    CVP (mmHg): 1 mmHg (Simultaneous filing.  User may not have seen previous data.) (23)      Intake/Output:     Intake/Output Summary (Last 24 hours) at 3/25/2023 1052  Last data filed at 3/25/2023 1000  Gross per 24 hour   Intake 1246.75 ml   Output 1156.3 ml   Net 90.45 ml       Physical Exam:    General:  Awake, sleepy at times, chronically ill looking on mechanical ventilation and CRRT   Eyes:  Sclera anicteric. Pupils equally round and reactive to light. Mouth/Throat: Mucous membranes normal, oral pharynx clear   Neck: Line in place bilaterally   Lungs:   Coarse crepitation bilaterally   CV:  LVAD hum   Abdomen:   Soft, non-tender. bowel sounds normal. non-distended   Extremities: No cyanosis or edema   Neuro: Open eyes spontaneously, wave and smile, at times follow simple command mainly on the right side, did not appreciate movement on left arm, minimal movement on left leg       LABS AND  DATA: Personally reviewed  Recent Labs     03/24/23 0400 03/23/23  0435   WBC 9.9 10.1   HGB 8.1* 8.2*   HCT 26.3* 26.0*    198     Recent Labs     03/24/23 0400 03/23/23  0435   * 134*   K 5.0 4.8    103   CO2 25 27   BUN 29* 30*   CREA 1.46* 1.40*   * 134*   CA 11.0* 10.9*   MG 3.3* 3.2*   PHOS 3.3 3.5     Recent Labs     03/24/23 0400 03/23/23  0435   * 388*   TP 6.2* 6.5   ALB 3.3* 3.4*   GLOB 2.9 3.1   LPSE  --  >3,000*     Recent Labs     03/24/23 0400 03/23/23  0435   INR 1.4* 1.4*   PTP 13.9* 13.9*      No results for input(s): PHI, PCO2I, PO2I, FIO2I in the last 72 hours. No results for input(s): CPK, CKMB, TROIQ, BNPP in the last 72 hours. Hemodynamics:   PAP: PAP Systolic: 44 (86/71/75 1998) CO: CO (l/min): 4.8 l/min (03/10/23 0900)   Wedge: PAOP (PCWP) (mmhg): 36 mmHg (02/03/23 1830) CI: CI (l/min/m2): 2.7 l/min/m2 (03/10/23 0900)   CVP:  CVP (mmHg): 1 mmHg (Simultaneous filing.  User may not have seen previous data.) (03/24/23 2000) SVR:       PVR:       Ventilator Settings:  Mode Rate Tidal Volume Pressure FiO2 PEEP   Assist control, Volume control   350 ml  15 cm H2O 40 % 5 cm H20     Peak airway pressure: 20 cm H2O    Minute ventilation: 7.5 l/min        MEDS: Reviewed    Chest X-Ray:  CXR Results  (Last 48 hours)      None            Assessment and Plan:    Transitioning to [address] [place] [address].      -Cardiomyopathy status post HeartMate 3 LVAD as destination therapy on April 15, 2023:  -Acute kidney injury on chronic kidney disease requiring CRRT:  - Hepatic congestion with elevated bilirubin:  - Right SUPA ischemic stroke with left hemiparesis  -Acute on chronic hypoxic respiratory failure, status post tracheostomy:  - Pancreatitis:  - Critical illness myopathy:     NEUROLOGICAL: Acute encephalopathy, critical illness polyneuropathy   No acute changes in his neurological finding, left hemiparesis. On aspirin. Seems that his alertness is improving slowly  [facility] ppx -- hold 3/24  Gabapentin 200 bid forLE pain  Trazodone 25 QHS for sleep  Hearing Aids -- need to new battery! PULMONOLOGY: Acute proximal respiratory failure, status post tracheostomy 7 March 2023   -Protective ventilatory strategy  -Daily SBT  -Pulmonary hygiene     CARDIOVASCULAR: Ischemic and nonischemic cardiomyopathy, left ventricular ejection fraction 25 to 30%, status post LVAD 15 February 2023, status post Impella right side removed 1 March 2023, cardiorenal syndrome   -Appreciate heart failure and cardiothoracic surgery input and management.  Family meeting 3/24  -Continue aspirin  -Off pressors     GASTROINTESTINAL: Hepatic congestion, hyperbilirubinemia, possible cirrhosis, pancreatitis   -Continue enteral feeds  -Continue GI prophylaxis  -Total bilirubin continue to trend up, lipase remains elevated     RENAL/ELECTROLYTE/FLUIDS: Acute on chronic renal failure   -Continue hemodialysis with continuous renal replacement therapy, 0 factor  -Strict I's and O's  -Place electrolytes as indicated  -Nephrology following, I appreciate the recommendation and management     ENDOCRINE:   -Maintain blood glucose levels between 140-180 -Sliding scale insulin   HEMATOLOGY/ONCOLOGY: Acute on chronic anemia, gastric neuroendocrine tumor   Transfuse for hemoglobin below 7  Off heparin or other anticoagulation at this time  ID/MICRO:   Patient on meropenem at least since the 16th. Received multiple antibiotic regimen before that. DISPOSITION  Stay in ICU    CRITICAL CARE CONSULTANT NOTE  I had a face to face encounter with the patient, reviewed and interpreted patient data including clinical events, labs, images, vital signs, I/O's, and examined patient. I have discussed the case and the plan and management of the patient's care with the consulting services, the bedside nurses and the respiratory therapist.      NOTE OF PERSONAL INVOLVEMENT IN CARE   This patient has a high probability of imminent, clinically significant deterioration, which requires the highest level of preparedness to intervene urgently. I participated in the decision-making and personally managed or directed the management of the following life and organ supporting interventions that required my frequent assessment to treat or prevent imminent deterioration.       Cary Landaverde DO  Staff Intensivist  [facility]  3/25/2023

## 2023-03-25 NOTE — PROGRESS NOTES
SW attended family meeting today at 1:45pm. Present included LVAD coordinator YANDEL Canas, pt. Wife, son, and daughter, bedside nurse, palliative care Jewel Jarquin, Dr. Efren Schumacher. And intensivist. Discussed latest developments including pancreatitis and related pain. Family discussed turning off LVAD pump due to very slim chance of recovered quality of life, likihood of increased suffering and long stay in rehab facility with minimal chance of organ recovery (kidney, liver, and pancreas), and patients' own express wishes prior to LVAD implant stating he did not want dialysis for a prolonged period, prolonged stay in rehab facility, and painful recovery with little chance of return to baseline functioning. Family asked questions and discussed the process for withdrawing care with the team. Pt. Son stated the family has started to prepare for  arrangements and has requested a  to visit with pt. Intensivist will be present during the weekend and pump withdrawal will likely be soon.

## 2023-03-25 NOTE — DISCHARGE SUMMARY
Admit date: 1/26/2023   Admitting Provider: Ward Soliman MD    Discharge date: 3/25/2023  Discharging Provider: Kori Rodríguez DO      * Admission Diagnoses: CHF (congestive heart failure) [facility]) [I50.9]    * Discharge Diagnoses:    Hospital Problems as of 3/25/2023 Date Reviewed: 1/24/2023            Codes Class Noted - Resolved POA    LVAD (left ventricular assist device) present [facility]) ICD-10-CM: U27.642  ICD-9-CM: V43.21  3/20/2023 - Present Unknown        CHF (congestive heart failure) ([facility]) ICD-10-CM: I50.9  ICD-9-CM: 428.0  12/12/2022 - Present Unknown           * Hospital Course: 71year old male with advanced HF  admitted to [facility] on 1/26 due to ongoing symptoms secondary to his HFrEF. Treated for possible CAP, and diuresed for acute on chronic HFrEF. Nephrology following for TANNER on CKD 3. AHF team recommended transfer to CVICU for [facility] placement and ongoing LVAD workup, with placement 2/15. Pt extubated 2/17, however with development of worsening renal dysfunction overnight and unable to tolerate CRRT so was reintubated and taken for Impella RP placement for right-sided support in a.m. of February 18 and CRRT resumed. Impella removed on 3/1. Overnight 3/1-3/1 CVA noted with SAH.  3/2 SAH enlarged. Held anticoagulation. Failed extubation 3/2. Trach placed 3/7. Transitioned to [facility]. Repeat CTH 3/10 w/ persistent SAH, heparin held. Ongoing meetings with family, patients progress has been impeded with ongoing respiratory failure, CVVH, critical illness myopathy, delirium, post-SAH. Now  has multisystem organ failure. The patient made his wishes clear pre-VAD that he would not want to be on life support and require institutionalization for any prolonged period of time. Family meeting with [facility], AHF, Palliative, Ethics on 3/24. Pts wife and son have elected for transition to comfort measures and for discontinuation of VAD - which occurred 3/25 with family at bedside. He passed briefly afterwards. Condolences extended to family. Time of Death 1330.       Discharge Medications:  Current Discharge Medication List        Follow-up Information       Follow up With Specialties Details Why Contact Info    Elisa Greenwood MD Family Medicine   [address] [place] [address] Drive  [phone]              Signed:  Amado Mclaughlin DO  3/25/2023  1:37 PM

## 2023-03-25 NOTE — PROGRESS NOTES
2000 Bedside shift change report given to [address] (oncoming nurse) by Jackie Pope RN (offgoing nurse). Report included the following information SBAR, Kardex, ED Summary, OR Summary, Procedure Summary, Intake/Output, MAR, Accordion, Recent Results, and Cardiac Rhythm NSR . Assessment completed and LVAD settings noted. 0205 Arterial blood pressure alarming- RN entered room to find patient with art line removed. Blood pressure cuff applied to LUE.    0730 Bedside shift change report given to Ana Kumari RN (oncoming nurse) by Imelda Gonzalez RN (offgoing nurse). Report included the following information SBAR, Kardex, ED Summary, OR Summary, Procedure Summary, Intake/Output, MAR, Accordion, Recent Results, and Cardiac Rhythm NSR .   ____________________________________________  Problem: Diabetes Self-Management  Goal: *Disease process and treatment process  Description: Define diabetes and identify own type of diabetes; list 3 options for treating diabetes. Outcome: Progressing Towards Goal  Goal: *Incorporating nutritional management into lifestyle  Description: Describe effect of type, amount and timing of food on blood glucose; list 3 methods for planning meals. Outcome: Progressing Towards Goal  Goal: *Incorporating physical activity into lifestyle  Description: State effect of exercise on blood glucose levels. Outcome: Progressing Towards Goal  Goal: *Developing strategies to promote health/change behavior  Description: Define the ABC's of diabetes; identify appropriate screenings, schedule and personal plan for screenings. Outcome: Progressing Towards Goal  Goal: *Using medications safely  Description: State effect of diabetes medications on diabetes; name diabetes medication taking, action and side effects. Outcome: Progressing Towards Goal  Goal: *Monitoring blood glucose, interpreting and using results  Description: Identify recommended blood glucose targets  and personal targets.   Outcome: Progressing Towards Goal  Goal: *Prevention, detection, treatment of acute complications  Description: List symptoms of hyper- and hypoglycemia; describe how to treat low blood sugar and actions for lowering  high blood glucose level. Outcome: Progressing Towards Goal  Goal: *Prevention, detection and treatment of chronic complications  Description: Define the natural course of diabetes and describe the relationship of blood glucose levels to long term complications of diabetes. Outcome: Progressing Towards Goal  Goal: *Developing strategies to address psychosocial issues  Description: Describe feelings about living with diabetes; identify support needed and support network  Outcome: Progressing Towards Goal  Goal: *Insulin pump training  Outcome: Progressing Towards Goal  Goal: *Sick day guidelines  Outcome: Progressing Towards Goal  Goal: *Patient Specific Goal (EDIT GOAL, INSERT TEXT)  Outcome: Progressing Towards Goal     Problem: Patient Education: Go to Patient Education Activity  Goal: Patient/Family Education  Outcome: Progressing Towards Goal     Problem: Pressure Injury - Risk of  Goal: *Prevention of pressure injury  Description: Document Mike Scale and appropriate interventions in the flowsheet.   Outcome: Progressing Towards Goal  Note: Pressure Injury Interventions:  Sensory Interventions: Assess changes in LOC, Assess need for specialty bed, Avoid rigorous massage over bony prominences, Check visual cues for pain, Discuss PT/OT consult with provider, Float heels, Keep linens dry and wrinkle-free, Minimize linen layers, Maintain/enhance activity level    Moisture Interventions: Absorbent underpads, Apply protective barrier, creams and emollients, Assess need for specialty bed, Check for incontinence Q2 hours and as needed, Internal/External fecal devices, Contain wound drainage    Activity Interventions: Pressure redistribution bed/mattress(bed type), PT/OT evaluation    Mobility Interventions: HOB 30 degrees or less    Nutrition Interventions: Document food/fluid/supplement intake    Friction and Shear Interventions: Apply protective barrier, creams and emollients, Feet elevated on foot rest, HOB 30 degrees or less, Foam dressings/transparent film/skin sealants, Lift sheet, Minimize layers                Problem: Patient Education: Go to Patient Education Activity  Goal: Patient/Family Education  Outcome: Progressing Towards Goal     Problem: Falls - Risk of  Goal: *Absence of Falls  Description: Document Laverne Fall Risk and appropriate interventions in the flowsheet. Outcome: Progressing Towards Goal  Note: Fall Risk Interventions:  Mobility Interventions: PT Consult for mobility concerns    Mentation Interventions: Door open when patient unattended    Medication Interventions: Evaluate medications/consider consulting pharmacy    Elimination Interventions:  Toileting schedule/hourly rounds    History of Falls Interventions: Door open when patient unattended         Problem: Patient Education: Go to Patient Education Activity  Goal: Patient/Family Education  Outcome: Progressing Towards Goal     Problem: Pain  Goal: *Control of Pain  Outcome: Progressing Towards Goal  Goal: *PALLIATIVE CARE:  Alleviation of Pain  Outcome: Progressing Towards Goal     Problem: Patient Education: Go to Patient Education Activity  Goal: Patient/Family Education  Outcome: Progressing Towards Goal     Problem: Patient Education: Go to Patient Education Activity  Goal: Patient/Family Education  Outcome: Progressing Towards Goal     Problem: Patient Education: Go to Patient Education Activity  Goal: Patient/Family Education  Outcome: Progressing Towards Goal     Problem: Nutrition Deficit  Goal: *Optimize nutritional status  Outcome: Progressing Towards Goal     Problem: Ventilator Management  Goal: *Adequate oxygenation and ventilation  Outcome: Progressing Towards Goal  Goal: *Patient maintains clear airway/free of aspiration  Outcome: Progressing Towards Goal  Goal: *Absence of infection signs and symptoms  Outcome: Progressing Towards Goal  Goal: *Normal spontaneous ventilation  Outcome: Progressing Towards Goal     Problem: Patient Education: Go to Patient Education Activity  Goal: Patient/Family Education  Outcome: Progressing Towards Goal     Problem: Patient Education: Go to Patient Education Activity  Goal: Patient/Family Education  Outcome: Progressing Towards Goal     Problem: LVAD Post-op Day 1  Goal: Off Pathway (Use only if patient is Off Pathway)  Outcome: Progressing Towards Goal  Goal: Activity/Safety  Outcome: Progressing Towards Goal  Goal: Consults, if ordered  Outcome: Progressing Towards Goal  Goal: Diagnostic Test/Procedures  Outcome: Progressing Towards Goal  Goal: Nutrition/Diet  Outcome: Progressing Towards Goal  Goal: Medications  Outcome: Progressing Towards Goal  Goal: Respiratory  Outcome: Progressing Towards Goal  Goal: Treatments/Interventions/Procedures  Outcome: Progressing Towards Goal  Goal: Psychosocial  Outcome: Progressing Towards Goal  Goal: *Hemodynamically stable without vasoactive medications  Outcome: Progressing Towards Goal  Goal: *Lungs clear or at baseline  Outcome: Progressing Towards Goal  Goal: *Adequate oxygenation  Outcome: Progressing Towards Goal  Goal: *Mechanical ventilation discontinued  Outcome: Progressing Towards Goal  Goal: *Optimal pain control at patient's stated goal  Outcome: Progressing Towards Goal  Goal: *Demonstrates progressive activity  Outcome: Progressing Towards Goal  Goal: *Tolerating diet  Outcome: Progressing Towards Goal  Goal: *Level of consciousness returns to baseline  Outcome: Progressing Towards Goal  Goal: *LVAD parameters within set limits  Outcome: Progressing Towards Goal     Problem: LVAD Post-op Day 2  Goal: Off Pathway (Use only if patient is Off Pathway)  Outcome: Progressing Towards Goal  Goal: Activity/Safety  Outcome: Progressing Towards Goal  Goal: Consults, if ordered  Outcome: Progressing Towards Goal  Goal: Diagnostic Test/Procedures  Outcome: Progressing Towards Goal  Goal: Nutrition/Diet  Outcome: Progressing Towards Goal  Goal: Medications  Outcome: Progressing Towards Goal  Goal: Discharge Planning  Outcome: Progressing Towards Goal  Goal: Respiratory  Outcome: Progressing Towards Goal  Goal: Treatments/Interventions/Procedures  Outcome: Progressing Towards Goal  Goal: Psychosocial  Outcome: Progressing Towards Goal  Goal: *Hemodynamically stable without vasoactive medications  Outcome: Progressing Towards Goal  Goal: *Lungs clear or at baseline  Outcome: Progressing Towards Goal  Goal: *Adequate oxygenation  Outcome: Progressing Towards Goal  Goal: *Optimal pain control at patient's stated goal  Outcome: Progressing Towards Goal  Goal: *Demonstrates progressive activity  Outcome: Progressing Towards Goal  Goal: *Tolerating diet  Outcome: Progressing Towards Goal  Goal: *LVAD parameters within set limits  Outcome: Progressing Towards Goal     Problem: Nutrition Deficit  Goal: *Optimize nutritional status  Outcome: Progressing Towards Goal

## 2023-03-27 LAB
ABO + RH BLD: NORMAL
ANTI-COMPLEMENT (C3B,C3D): NORMAL
BLD PROD TYP BPU: NORMAL
BLD PROD TYP BPU: NORMAL
BLOOD BANK CMNT PATIENT-IMP: NORMAL
BLOOD GROUP ANTIBODIES SERPL: NORMAL
BPU ID: NORMAL
BPU ID: NORMAL
CROSSMATCH RESULT,%XM: NORMAL
CROSSMATCH RESULT,%XM: NORMAL
DAT IGG-SP REAG RBC QL: NORMAL
DAT POLY-SP REAG RBC QL: NORMAL
SPECIMEN EXP DATE BLD: NORMAL
STATUS OF UNIT,%ST: NORMAL
STATUS OF UNIT,%ST: NORMAL
UNIT DIVISION, %UDIV: 0
UNIT DIVISION, %UDIV: 0

## 2023-12-06 NOTE — PROGRESS NOTES
Day #1 of Ancef  Indication:  surgical ppx  Current regimen:  2 gm IV q6h x 48 hours    Recent Labs      18   1439   WBC  12.9*   CREA  1.46*   BUN  19     Est CrCl: 40 ml/min  Temp (24hrs), Av.4 °F (36.9 °C), Min:98.4 °F (36.9 °C), Max:98.4 °F (36.9 °C)    Plan: Dose adjusted to 2 gm IV q8h x 48 hours. Additional changes:  Pepcid dose adjusted to 20 mg daily for crcl < 50. [place] ambulatory encounter  URGENT CARE VISIT    Chief Complaint   Patient presents with    Fever    Sore Throat    Cough     History of Present Illness (HPI): 21 yo M presents for evaluation of fever, sore throat, and cough onset 2 days ago. There is an illness going around at work. Unknown if COVID or influenza. Denies any chest pain, dyspnea, difficulty breathing, wheezing, palpitations, vomiting, diarrhea or abdominal pain.     Managing symptoms with Tylenol and ibuprofen w/o relief. Patient is tolerating p.o. intake and maintaining adequate hydration. No asthma history. Quit smoking tobacco in Sept 2023. Called off of work due to current sx and desires a work excuse. No other complaints at this time.    Past Medical History:   Diagnosis Date    ADHD 2019      Allergies   Allergen Reactions    Amoxicillin HIVES      Subjective   Review of Systems   Constitutional:  Positive for chills, fatigue and fever.   HENT:  Positive for congestion (mild) and sore throat. Negative for ear pain, mouth sores and sinus pain.    Eyes: Negative.    Respiratory:  Positive for cough. Negative for shortness of breath, wheezing and stridor.    Cardiovascular:  Negative for chest pain and palpitations.   Gastrointestinal:  Positive for nausea (intermittent and mild). Negative for abdominal pain, diarrhea and vomiting.   Genitourinary: Negative.    Musculoskeletal:  Positive for myalgias. Negative for neck pain.   Skin:  Negative for rash.   Neurological:  Negative for dizziness, syncope and light-headedness.   Psychiatric/Behavioral:  Positive for sleep disturbance (could not sleep last night d/t currrent sx).    All other systems reviewed and are negative.     Objective   Vital signs: Blood pressure 133/72, pulse (!) 114, temperature (!) 101.4 °F (38.6 °C), temperature source Oral, resp. rate 16, SpO2 97 %.  Physical Exam  Vitals and nursing note reviewed.   Constitutional:       General: He is not in acute distress.      Appearance: Normal appearance. He is not toxic-appearing or diaphoretic.   HENT:      Head: Normocephalic and atraumatic.      Right Ear: Tympanic membrane, ear canal and external ear normal.      Left Ear: Tympanic membrane, ear canal and external ear normal.      Nose: Nose normal.      Mouth/Throat:      Mouth: Mucous membranes are moist. No oral lesions.      Pharynx: Oropharynx is clear. Uvula midline. Posterior oropharyngeal erythema present. No pharyngeal swelling, oropharyngeal exudate or uvula swelling.      Tonsils: No tonsillar exudate or tonsillar abscesses. 2+ on the right. 2+ on the left.   Eyes:      Conjunctiva/sclera: Conjunctivae normal.      Pupils: Pupils are equal, round, and reactive to light.   Cardiovascular:      Rate and Rhythm: Regular rhythm. Tachycardia present.      Pulses: Normal pulses.      Heart sounds: Normal heart sounds.   Pulmonary:      Effort: Pulmonary effort is normal. No tachypnea, accessory muscle usage or respiratory distress.      Breath sounds: Normal breath sounds. No stridor. No wheezing, rhonchi or rales.      Comments: Speaking in full sentences.  Chest:      Chest wall: No tenderness.   Abdominal:      Palpations: Abdomen is soft.      Tenderness: There is no abdominal tenderness.   Musculoskeletal:      Cervical back: Neck supple. No rigidity or tenderness.   Lymphadenopathy:      Cervical: No cervical adenopathy.   Skin:     General: Skin is warm.      Capillary Refill: Capillary refill takes less than 2 seconds.      Findings: No rash.   Neurological:      General: No focal deficit present.      Mental Status: He is alert and oriented to person, place, and time.      Gait: Gait normal.   Psychiatric:         Mood and Affect: Mood normal.         Behavior: Behavior is cooperative.             Results for orders placed or performed in visit on 12/06/23   SARS-COV-2/INFLUENZA BY PCR   Result Value    Rapid SARS-COV-2 by PCR Not Detected    Influenza A by PCR  Detected (A)    Influenza B by PCR Not Detected    Isolation Guidelines      Comment: Do not use this test result as the sole decision-maker for discontinuation of isolation.   Clinical evaluation should be considered for other respiratory illness requiring transmission-based isolation.    -    No fever (<99.0 F/37.2 C) for at least 24 hours without the use of fever-reducing medications    AND  -    Respiratory symptoms have improved or resolved (e.g. cough, shortness of breath)     AND  -    COVID-19 negative test    See COVID-19 Deisolation Resource Guide    Procedural Comment      Comment: SARS-COV-2 nucleic acid has not been detected indicating the absence of COVID-19.    This test was performed using the [employer] Xpert Xpress SARS-CoV-2/Flu/RSV RT-PCR test that has been given Emergency Use Authorization (EUA) by the [place] Food and Drug Administration (FDA). These tests are considered definitive and do not need to be confirmed by another method.   Streptococcus group A PCR    Specimen: Throat; Swab   Result Value    STREPTOCOCCUS GROUP A PCR Not Detected     Comment: This result was obtained by RT-PCR amplification followed by fluorescence detection. This test has been cleared by the U.[place]. Food and Drug Administration for detection of Strep A.   Assessment & Plan    1. Viral URI with cough  -     SARS-COV-2/INFLUENZA BY PCR  -     Streptococcus group A PCR  -     dexamethasone (DECADRON) injection 10 mg  -     naproxen (NAPROSYN) 500 MG tablet; Take 1 tablet by mouth every 12 hours as needed for Pain.  Dispense: 12 tablet; Refill: 0  -     fluticasone (FLONASE) 50 MCG/ACT nasal spray; Spray 2 sprays in each nostril daily.  Dispense: 16 g; Refill: 0  -     benzonatate (TESSALON PERLES) 200 MG capsule; Take 1 capsule by mouth 3 times daily as needed for Cough.  Dispense: 18 capsule; Refill: 0  2. Influenza A  -     oseltamivir (TAMIFLU) 75 MG capsule; Take 1 capsule by mouth in the morning and 1 capsule in  the evening. Do all this for 5 days.  Dispense: 10 capsule; Refill: 0  3. Nausea in adult  -     ondansetron (ZOFRAN ODT) 4 MG disintegrating tablet; Place 1 tablet onto the tongue every 8 hours as needed for Nausea.  Dispense: 10 tablet; Refill: 0    History and exam are consistent with a viral illness. Influenza A detected. Qualifies for antiviral therapy w/ Tamiflu. Medication discussed. Desires to proceed w/ tx. Strep, COVID-19 not detected.     Patient noted to be febrile w/ mild tachycardia (likely fever-induced). Offered to treat w/ medication in clinic today and he declined. Patient plans to take Tylenol when he returns home. He is otherwise well-appearing, clinically well hydrated, and in no acute distress. Patient advised on symptomatic measures including, adequate rest and increased hydration. Recommended acetaminophen/ naproxen as needed. Return/ ER precautions discussed.    Recommended follow up with Primary care physician as needed. Patient advised to return to the Urgent care or Emergency department for any new or worsening symptoms. Patient verbalized understanding and agreement with plan. All questions addressed. Please refer to the after-visit summary (AVS).

## 2023-12-23 NOTE — ACP (ADVANCE CARE PLANNING)
Advance Care Planning     General Advance Care Planning (ACP) Conversation      Date of Conversation: 1/20/2021  Conducted with: Patient with Decision Making Capacity    Healthcare Decision Maker:     Primary Decision Maker (Active): Susanna Kendall - Spouse - [phone]    Secondary Decision Maker: Frank Lakhani - Daughter - [phone]  Click here to complete [employer] including selection of the Healthcare Decision Maker Relationship (ie \"Primary\")  Today we documented Decision Maker(s) consistent with Legal Next of Kin hierarchy. Content/Action Overview:    Has ACP document(s) NOT on file - requested patient to provide  Reviewed DNR/DNI and patient elects Full Code (Attempt Resuscitation)  Topics discussed: treatment goals, benefit/burden of treatment options, artificial nutrition, ventilation preferences, hospitalization preferences, resuscitation preferences, end of life care preferences (vegetative state/imminent death) and hospice care  Additional Comments: requested copy of his advance directive     Length of Voluntary ACP Conversation in minutes:  16 minutes    Misael Sahni MD
Patient does not have an Advanced Directive. Myrna Auguste LPN   Non-Provider Advance Care Planning (ACP) Note    Date of ACP Conversation: 1/20/2021  Persons included in Conversation: patient  Length of ACP Conversation in minutes: <16 minutes (Non-Billable)    Conversation requested by:   Provider    Authorized Decision Maker (if patient is incapable of making informed decisions): This person is:  Patient         General ACP for ALL Patients with Decision Making Capacity:    Advance Directive Conversation with Patients who have not yet planned:  Provider will review.       Review of Existing Advance Directive: (Select questions covered)  Advance planning reviewed by provider/ with provider    Interventions Provided:  Provided ACP educational materials:  Advance Directive Form
contact guard

## 2024-11-08 NOTE — PROGRESS NOTES
TRANSFER - IN REPORT:    Verbal report received from Liane Jenkins on 5 [facility]  being received from [address] [place] [address] for routine progression of care. Report consisted of patients Situation, Background, Assessment and Recommendations(SBAR). Information from the following report(s) Procedure Summary and MAR was reviewed with the receiving clinician. Opportunity for questions and clarification was provided. Assessment completed upon patients arrival to [address] [place] [address] and care assumed. Cardiac Cath Lab Recovery Arrival Note:    5 [facility] arrived to [facility] recovery area. Patient procedure= RHC. Patient on cardiac monitor, non-invasive blood pressure, SPO2 monitor. On RA. IV  of NS on pump at 0 ml/hr. Patient status doing well without problems. Patient is A&Ox 4. Patient reports no complaints. PROCEDURE SITE CHECK:    Procedure site:Right IJ without any bleeding and band aid, 0 pain/discomfort reported at procedure site. No change in patient status. Continue to monitor patient and status. - - -

## 2024-12-28 NOTE — PROGRESS NOTES
2000: Bedside and Verbal shift change report given to Patricio Trujillo RN (oncoming nurse) by Edil Fair RN (offgoing nurse). Report included the following information SBAR, Kardex, Intake/Output, MAR, Recent Results, and Cardiac Rhythm NSR/ST . Care endorsed to this RN at this time. Report received from EMERITA Lee.

## 2025-07-18 NOTE — Clinical Note
-chronic  -continue home sertraline  -additional PRN supportive care as indicated      History and physical documented and up to date, allergies reviewed, lab results reviewed, pre-procedure education provided, patient verbalized understanding of procedure, procedural consent signed and patient is NPO.

## (undated) DEVICE — AGENT HEMSTAT W4XL4IN OXIDIZED REGENERATED CELOS ABSRB SFT

## (undated) DEVICE — ABSORBABLE COLLAGEN HEMOSTATIC SPONGE, 3IN X 4IN, 5MM THICK: Brand: HELISTAT ® ABSORBABLE COLLAGEN HEMOSTATIC SPONGE

## (undated) DEVICE — PROVE COVER: Brand: UNBRANDED

## (undated) DEVICE — DRSG BORDR MPLX HEEL 8.7X9.1IN --

## (undated) DEVICE — CUSTOM KT PTCA INFL DEV K05 00052M

## (undated) DEVICE — CATHETER,URETHRAL,REDRUBBER,STRL,18FR: Brand: MEDLINE

## (undated) DEVICE — 1200 GUARD II KIT W/5MM TUBE W/O VAC TUBE: Brand: GUARDIAN

## (undated) DEVICE — GUIDEWIRE VASC J 3 MM 0.035 INX210 CM FIX COR INQWIRE

## (undated) DEVICE — SOLUTION IV 1000ML PH 7.4 INJ NRMSOL R

## (undated) DEVICE — ANGIOGRAPHY KIT

## (undated) DEVICE — PLEDGET SURG W3/16XL0.25IN THK1.65MM PTFE OVL FELT FOR THE

## (undated) DEVICE — CATHETER PTCA L130CM BLLN L40MM DIA6MM DRUG COAT BLLN

## (undated) DEVICE — LUER-LOK 360°: Brand: CONNECTA, LUER-LOK

## (undated) DEVICE — LIGHT HANDLE: Brand: DEVON

## (undated) DEVICE — DRAIN SURG 24FR L5/16IN DIA8MM SIL RND HUBLESS FULL FLUT

## (undated) DEVICE — CATHETER ANGIO L100CM DIA5FR MP A CRV COR INSLIDE ADD DBL

## (undated) DEVICE — PACK PROCEDURE SURG HRT CATH

## (undated) DEVICE — REM POLYHESIVE ADULT PATIENT RETURN ELECTRODE: Brand: VALLEYLAB

## (undated) DEVICE — PINNACLE INTRODUCER SHEATH: Brand: PINNACLE

## (undated) DEVICE — BAG PRESSURE INFUSION 1000ML -- CONVERT TO ITEM 360122

## (undated) DEVICE — SOLUTION IV 1000ML 140MEQ/L SOD 5MEQ/L K 3MEQ/L MG 27MEQ/L

## (undated) DEVICE — DRAPE FLD WRM W44XL66IN C6L FOR INTRATEMP SYS THERMABASIN

## (undated) DEVICE — RUNTHROUGH NS EXTRA FLOPPY PTCA GUIDEWIRE: Brand: RUNTHROUGH

## (undated) DEVICE — Device: Brand: PADPRO

## (undated) DEVICE — NEEDLE HYPO 18GA L1.5IN PNK S STL HUB POLYPR SHLD REG BVL

## (undated) DEVICE — MEDI-VAC NON-CONDUCTIVE SUCTION TUBING: Brand: CARDINAL HEALTH

## (undated) DEVICE — GLIDESHEATH SLENDER STAINLESS STEEL KIT: Brand: GLIDESHEATH SLENDER

## (undated) DEVICE — Device

## (undated) DEVICE — CATH ANGI BLLN DIL 2.5X15MM -- NC EUPHORA

## (undated) DEVICE — SUMP PERICARD AD 20FR WT TIP VERSATILE DSGN MULT PRT VENT

## (undated) DEVICE — INTENDED TO STANDARDIZE OR CAMERAS TO ALLOW FOR THE USE OF THE OR CAMERA COVER: Brand: ASPEN® O.R. CAMERA COVER

## (undated) DEVICE — SUTURE MCRYL SZ 3-0 L27IN ABSRB UD L24MM PS-1 3/8 CIR PRIM Y936H

## (undated) DEVICE — INTENDED FOR TISSUE SEPARATION, AND OTHER PROCEDURES THAT REQUIRE A SHARP SURGICAL BLADE TO PUNCTURE OR CUT.: Brand: BARD-PARKER ® CARBON RIB-BACK BLADES

## (undated) DEVICE — FCPS RAD JAW 4LC 240CM W/NDL -- BX/40

## (undated) DEVICE — LEAD PCMKR MYOCARDL BPLR TEMP. --

## (undated) DEVICE — CATH ANGI BLLN DIL 2.25X20MM -- NC EUPHORA

## (undated) DEVICE — KIT MFLD ISOLATN NACL CNTRST PRT TBNG SPIK W/ PRSS TRNSDUC

## (undated) DEVICE — BLADE OPHTH W1.5MM 15DEG ORNG HNDL SHRP SHRP DEL FOR CATRCT

## (undated) DEVICE — TUBING INSUFLTN 10FT LUER -- CONVERT TO ITEM 368568

## (undated) DEVICE — CATHETER,FOLEY,100%SILICONE,16FR,10ML,LF: Brand: MEDLINE

## (undated) DEVICE — KIT MED IMAG CNTRST AGNT W/ IOPAMIDOL REUSE

## (undated) DEVICE — ANGIOGRAPHIC CATHETER: Brand: IMPULSE™

## (undated) DEVICE — 1000ML,PRESSURE INFUSER W/STOPCOCK: Brand: MEDLINE

## (undated) DEVICE — GOWN,SIRUS,NONRNF,SETINSLV,XL,20/CS: Brand: MEDLINE

## (undated) DEVICE — DRAPE THERMABASIN INTRATEMP --

## (undated) DEVICE — SYR 50ML LR LCK 1ML GRAD NSAF --

## (undated) DEVICE — TUBING HYDR IRR --

## (undated) DEVICE — SOLUTION IV 1000ML 0.9% SOD CHL

## (undated) DEVICE — Z DUPLICATE USE 2103554 VALVE HEMOSTATIC BLEEDBK CTRL COPILOT

## (undated) DEVICE — HOOK LOCK LATEX FREE ELASTIC BANDAGE D/L 6INX10YD

## (undated) DEVICE — COVER LT HNDL PLAS RIG 1 PER PK

## (undated) DEVICE — RADIFOCUS GLIDEWIRE ADVANTAGE GUIDEWIRE: Brand: GLIDEWIRE ADVANTAGE

## (undated) DEVICE — MEDI-TRACE CADENCE ADULT, DEFIBRILLATION ELECTRODE -RTS  (10 PR/PK) - PHYSIO-CONTROL: Brand: MEDI-TRACE CADENCE

## (undated) DEVICE — OPEN HEART A- RICHMOND: Brand: MEDLINE INDUSTRIES, INC.

## (undated) DEVICE — SYR 10ML LUER LOK 1/5ML GRAD --

## (undated) DEVICE — STERILE POLYISOPRENE POWDER-FREE SURGICAL GLOVES: Brand: PROTEXIS

## (undated) DEVICE — INFECTION CONTROL KIT SYS

## (undated) DEVICE — BAG RED 3PLY 2MIL 30X40 IN

## (undated) DEVICE — CATH BLLN ANGIO 3.50X6MM NC EUPHORIA RX

## (undated) DEVICE — DEVICE VASC CLSR 6FR 7FR 10ML LOK SYR W BLLN CATHETER INTEGR

## (undated) DEVICE — SYR 3ML LL TIP 1/10ML GRAD --

## (undated) DEVICE — SPONGE DRN W4XL4IN RAYON/POLYESTER 6 PLY NONWOVEN PRECUT 2 PER PK

## (undated) DEVICE — GLOVE SURG ALOE LF PF 6.5 -- SENSICARE

## (undated) DEVICE — STERILE POLYISOPRENE POWDER-FREE SURGICAL GLOVES WITH EMOLLIENT COATING: Brand: PROTEXIS

## (undated) DEVICE — SYRINGE MED 20ML STD CLR PLAS LUERLOCK TIP N CTRL DISP

## (undated) DEVICE — TRAP SURG QUAD PARABOLA SLOT DSGN SFTY SCRN TRAPEASE

## (undated) DEVICE — CONTAINER SPEC 20 ML LID NEUT BUFF FORMALIN 10 % POLYPR STS

## (undated) DEVICE — Device: Brand: VASCULAR DILATOR KIT

## (undated) DEVICE — TEMP PACING WIRE: Brand: MYO/WIRE

## (undated) DEVICE — CONNECTOR DRNGE 3/8 1/2X3/16X3/16IN BASE L5MM ARM L10-13MM

## (undated) DEVICE — SUTURE DEK POLY GRN BR SZ3 0 T 2 2N 6716

## (undated) DEVICE — SUT PROL 4-0 36IN RB1 DA BLU --

## (undated) DEVICE — GLOVE SURG SZ 8 CRM LTX FREE POLYISOPRENE POLYMER BEAD ANTI

## (undated) DEVICE — SUT PROL 4-0 36IN SH DA BLU --

## (undated) DEVICE — SUTURE PROL SZ 7-0 L24IN NONABSORBABLE BLU L8MM BV175-6 3/8 8735H

## (undated) DEVICE — DEVICE SECUREMENT 1/32IN POLYETH FOAM F ANCHR URIN CATH

## (undated) DEVICE — Device: Brand: JELCO

## (undated) DEVICE — WRAP SURG W1.31XL1.34M CARD FOR PT 165-172CM THERMOWRP

## (undated) DEVICE — CANNULA PERF 25FR L30IN MULTISTAGE FEM VEN W/ INSRT KT

## (undated) DEVICE — CATHETER IV 14GA L2IN POLYUR STR ORNG HUB SFTY RADPQ DISP

## (undated) DEVICE — LUB SURG MEDC STRL 2OZ TUBE MC -- MEDICHOICE

## (undated) DEVICE — KENDALL RADIOLUCENT FOAM MONITORING ELECTRODE -RECTANGULAR SHAPE: Brand: KENDALL

## (undated) DEVICE — TRANSFER PK BLD PROD 300ML --

## (undated) DEVICE — WASTE KIT - ST MARY: Brand: MEDLINE INDUSTRIES, INC.

## (undated) DEVICE — 6 FOOT DISPOSABLE EXTENSION CABLE WITH SAFE CONNECT / SCREW-DOWN

## (undated) DEVICE — CANNULA ART VENTED 0.37 IN AD 21 FRX18 CM PERF BIO MEDICUS

## (undated) DEVICE — COPILOT BLEEDBACK CONTROL VALVE: Brand: COPILOT

## (undated) DEVICE — CATHETER DIAG AD 4FR L100CM STD NYL JUDKINS R 4 TRULUMEN

## (undated) DEVICE — AORTIC PUNCHES ARE USED TO CREATE A UNIFORM OPENING IN BLOOD VESSELS DURING CARDIOVASCULAR SURGERY. THE VESSEL GRAFT IS INSERTED INTO THE CREATED OPENING AND SUTURED TO THE VESSEL WALL. AORTIC LANCETS ARE USED TO MAKE A SMALL UNIFORM CUT IN A BLOOD VESSEL TO FACILITATE INSERTION OF AN AORTIC PUNCH.  PUNCHES COME IN VARIOUS LENGTHS, DIAMETERS AND TIP CONFIGURATIONS.: Brand: CLEANCUT ROTATING AORTIC PUNCH

## (undated) DEVICE — COATED BRAID POLY

## (undated) DEVICE — SOLUTION IRRIG 1000ML H2O STRL BLT

## (undated) DEVICE — HOLDER SHRP TEMP SH STP DISP -- ADVANTAGE

## (undated) DEVICE — STIFFEN MICRO-INTRODUCER KIT: Brand: STIFFEN MICRO-INTRODUCER KIT

## (undated) DEVICE — FOGARTY - HYDRAGRIP SURGICAL - CLAMP INSERTS: Brand: FOGARTY SOFTJAW

## (undated) DEVICE — 6FR THERMODILUTION BALLOON CATHETER SWAN (ARROW)

## (undated) DEVICE — CONNECTOR DRNGE W3/8-0.5XH3/16XL3/16IN 2:1 SIL CARD STR

## (undated) DEVICE — CATHETER ANGIO AD 5FR L65CM DIA0.049IN GWIRE 0.035IN SHEP

## (undated) DEVICE — SOLIDIFIER FLUID 3000 CC ABSORB

## (undated) DEVICE — SENSOR TEMP SKIN DISP

## (undated) DEVICE — BAND RADIAL COMPR ARTERY 24CM -- REG BX/10

## (undated) DEVICE — ENDO CARRY-ON PROCEDURE KIT INCLUDES ENZYMATIC SPONGE, GAUZE, BIOHAZARD LABEL, TRAY, LUBRICANT, DIRTY SCOPE LABEL, WATER LABEL, TRAY, DRAWSTRING PAD, AND DEFENDO 4-PIECE KIT.: Brand: ENDO CARRY-ON PROCEDURE KIT

## (undated) DEVICE — KIT AT-X65 ANGIOTOUCH HAND CONTROLLER

## (undated) DEVICE — QUILTED PREMIUM COMFORT UNDERPAD,EXTRA HEAVY: Brand: WINGS

## (undated) DEVICE — AORTIC PERFUSION CANNULA: Brand: EDWARDS LIFESCIENCES AORTIC PERFUSION CANNULA

## (undated) DEVICE — SET ADMIN 16ML TBNG L100IN 2 Y INJ SITE IV PIGGY BK DISP

## (undated) DEVICE — DRESSING AQUACEL ADVANTAGE ALG W4XL5IN RECT GREATER ABSRB HYDRFBR TECHNOLOGY

## (undated) DEVICE — 40418 TRENDELENBURG ONE-STEP ARM PROTECTORS LARGE (1 PAIR): Brand: 40418 TRENDELENBURG ONE-STEP ARM PROTECTORS LARGE (1 PAIR)

## (undated) DEVICE — SUTURE PERMA-HAND 0 L18IN NONABSORBABLE BLK CT-1 L36MM 1/2 C021D

## (undated) DEVICE — Device: Brand: CLEANCUT ROTATING AORTIC PUNCH, 4.5 MM

## (undated) DEVICE — SUTURE VCRL SZ 0 L18IN ABSRB VLT L40MM CT 1/2 CIR J752D

## (undated) DEVICE — CATHETER PTCA 5FR L135CM BLLN L40MM DIA5MM 20ATM OTW LO

## (undated) DEVICE — Device: Brand: DISPOSABLE ELECTROSURGICAL SNARE

## (undated) DEVICE — FORCEPS BX L240CM JAW DIA2.8MM L CAP W/ NDL MIC MESH TOOTH

## (undated) DEVICE — SUTURE PROL SZ 5-0 L30IN NONABSORBABLE BLU L13MM RB-2 1/2 8710H

## (undated) DEVICE — PRESSURE MONITORING SET: Brand: TRUWAVE

## (undated) DEVICE — Z DISCONTINUED USE 2751540 TUBING IRRIG L10IN DISP PMP ENDOGATOR

## (undated) DEVICE — DESTINATION RENAL GUIDING SHEATH: Brand: DESTINATION

## (undated) DEVICE — ROYAL SILK SURGICAL GOWN, XL: Brand: CONVERTORS

## (undated) DEVICE — YANKAUER,BULB TIP,W/O VENT,RIGID,STERILE: Brand: MEDLINE

## (undated) DEVICE — SUMP INTCARD W/ 1/4INCH CONN 20FRENCH 15INCH DLP

## (undated) DEVICE — 3M™ TEGADERM™ TRANSPARENT FILM DRESSING FRAME STYLE, 1629, 8 IN X 12 IN (20 CM X 30 CM), 10/CT 8CT/CASE: Brand: 3M™ TEGADERM™

## (undated) DEVICE — HI-TORQUE VERSACORE MODIFIED J GUIDE WIRE SYSTEM 260 CM: Brand: HI-TORQUE VERSACORE

## (undated) DEVICE — CANN NASAL O2 CAPNOGRAPHY AD -- FILTERLINE

## (undated) DEVICE — PLEDGET SUT SFT OVL 3 8X5 16IN

## (undated) DEVICE — CO-SET DELIVERY SYSTEM FOR 123 ROOM TEMPATURE INJECTATE: Brand: CO-SET+

## (undated) DEVICE — KIT HND CTRL 3 W STPCOCK ROT END 54IN PREM HI PRSS TBNG AT

## (undated) DEVICE — BANDAGE COMPR ELASTIC 5 YDX6 IN

## (undated) DEVICE — SOL ANTI-FOG 6ML MEDC -- MEDICHOICE - CONVERT TO 358427

## (undated) DEVICE — CATH GUID COR EB35 6FR 100CM -- LAUNCHER

## (undated) DEVICE — BARD® PTFE FELT, 15.2 CM X 15.2 CM: Brand: BARD® PTFE FELT

## (undated) DEVICE — OPEN HEART B-RICHMOND: Brand: MEDLINE INDUSTRIES, INC.

## (undated) DEVICE — SPECIAL PROCEDURE DRAPE 32" X 34": Brand: SPECIAL PROCEDURE DRAPE

## (undated) DEVICE — CONNECTOR TBNG AUX H2O JET DISP FOR OLY 160/180 SER

## (undated) DEVICE — SOLUTION IV 500ML 0.9% SOD CHL FLX CONT

## (undated) DEVICE — GLUE TISS 10ML PLAS STD SPRD TIP SYR PLUNG PREFIL SKIN CLSR 5PK

## (undated) DEVICE — CATHETER PRESSURE WEDGE BLLN 6FRX110CM

## (undated) DEVICE — (D)PREP SKN CHLRAPRP APPL 26ML -- CONVERT TO ITEM 371833

## (undated) DEVICE — BAG TRASH WST 122 CM 183 CM 1400 CC FEMALE LUER PVC DEPOT

## (undated) DEVICE — PREP SKN CHLRAPRP APL 26ML STR --

## (undated) DEVICE — STERNUM BLADE, OFFSET (31.7 X 0.64 X 6.3MM)

## (undated) DEVICE — TIDISHIELD TRANSPORT, CONTAINMENT COVER FOR BACK TABLE 4'6" (1.37M) TO 8' (2.43M) IN LENGTH: Brand: TIDISHIELD

## (undated) DEVICE — DEVON™ KNEE AND BODY STRAP 60" X 3" (1.5 M X 7.6 CM): Brand: DEVON

## (undated) DEVICE — SPONGE LAPAROTOMY W18XL18IN WHITE STRUNG RADIOPAQUE STERILE

## (undated) DEVICE — SPLINT WR POS F/ARTERIAL ACC -- BX/10

## (undated) DEVICE — CATH IV AUTOGRD BC BLU 22GA 25 -- INSYTE

## (undated) DEVICE — KIT BLWR MISTER 5P 15L W/ TBNG SET IRRIG MIST TO IMPROVE

## (undated) DEVICE — CANNULA PERF 15FR L12.5IN RG STPCOCK WIREWOUND BODY

## (undated) DEVICE — SUT PROL 7-0 24IN BV1 DA BLU --

## (undated) DEVICE — BW-412T DISP COMBO CLEANING BRUSH: Brand: SINGLE USE COMBINATION CLEANING BRUSH

## (undated) DEVICE — SUTURE SZ 7 L18IN NONABSORBABLE SIL CCS L48MM 1/2 CIR STRNM M655G

## (undated) DEVICE — KIT IV STRT W CHLORAPREP PD 1ML

## (undated) DEVICE — APPLICATOR MEDICATED 10.5 CC SOLUTION HI LT ORNG CHLORAPREP

## (undated) DEVICE — CATH ANGI BLLN DIL 2.0X12MM -- NC EUPHORA

## (undated) DEVICE — CLIP MED L235CM L2.8MM 11MM OPN HEMSTAT FIX RESOL

## (undated) DEVICE — BAG SPEC BIOHZD LF 2MIL 6X10IN -- CONVERT TO ITEM 357326

## (undated) DEVICE — SYS VSL HARV HEMOPRO2 VASOVIEW -- HARV SYS MINIMUM ORDER 5/EA

## (undated) DEVICE — LOOP,VESSEL,MAXI,BLUE,2/PK,STERILE: Brand: MEDLINE

## (undated) DEVICE — DRAIN,WOUND,ROUND,24FR,5/16",FULL-FLUTED: Brand: MEDLINE

## (undated) DEVICE — SOL INJ SOD CL 0.9% 500ML BG --

## (undated) DEVICE — CATH BLLN ANGIO 3X6MM NC EUPHORIA RX

## (undated) DEVICE — DRAPE PRB US TRNSDCR 6X96IN --

## (undated) DEVICE — AIRLIFE™ U/CONNECT-IT OXYGEN TUBING 7 FEET (2.1 M) CRUSH-RESISTANT OXYGEN TUBING, VINYL TIPPED: Brand: AIRLIFE™

## (undated) DEVICE — SOL SCRUB DYNA HEX 4% 4OZ --

## (undated) DEVICE — Z DISCONTINUED NO SUB IDED SET EXTN W/ 4 W STPCOCK M SPIN LOK 36IN

## (undated) DEVICE — ABDOMINAL PAD: Brand: DERMACEA

## (undated) DEVICE — APPLICATOR BNDG 1MM ADH PREMIERPRO EXOFIN

## (undated) DEVICE — GAUZE SPONGES,12 PLY: Brand: CURITY

## (undated) DEVICE — BAG BELONG PT PERS CLEAR HANDL

## (undated) DEVICE — AVID DUAL STAGE VENOUS DRAINAGE CANNULA: Brand: AVID DUAL STAGE VENOUS DRAINAGE CANNULA

## (undated) DEVICE — SUT PROL 3-0 30IN SH1 DA BLU --